# Patient Record
Sex: FEMALE | Race: WHITE | NOT HISPANIC OR LATINO | Employment: OTHER | URBAN - METROPOLITAN AREA
[De-identification: names, ages, dates, MRNs, and addresses within clinical notes are randomized per-mention and may not be internally consistent; named-entity substitution may affect disease eponyms.]

---

## 2017-01-04 ENCOUNTER — OFFICE VISIT (OUTPATIENT)
Dept: UROLOGY | Facility: CLINIC | Age: 62
End: 2017-01-04
Payer: MEDICARE

## 2017-01-04 VITALS
DIASTOLIC BLOOD PRESSURE: 51 MMHG | WEIGHT: 179 LBS | SYSTOLIC BLOOD PRESSURE: 96 MMHG | BODY MASS INDEX: 30.56 KG/M2 | HEIGHT: 64 IN | HEART RATE: 67 BPM | TEMPERATURE: 97 F

## 2017-01-04 DIAGNOSIS — Z93.59 SUPRAPUBIC CATHETER: Primary | ICD-10-CM

## 2017-01-04 DIAGNOSIS — N31.9 NEUROGENIC BLADDER: ICD-10-CM

## 2017-01-04 DIAGNOSIS — N39.490 OVERFLOW INCONTINENCE: ICD-10-CM

## 2017-01-04 PROCEDURE — 99024 POSTOP FOLLOW-UP VISIT: CPT | Mod: S$GLB,,, | Performed by: NURSE PRACTITIONER

## 2017-01-04 PROCEDURE — 87088 URINE BACTERIA CULTURE: CPT

## 2017-01-04 PROCEDURE — 87186 SC STD MICRODIL/AGAR DIL: CPT

## 2017-01-04 PROCEDURE — 87077 CULTURE AEROBIC IDENTIFY: CPT

## 2017-01-04 PROCEDURE — 1159F MED LIST DOCD IN RCRD: CPT | Mod: S$GLB,,, | Performed by: NURSE PRACTITIONER

## 2017-01-04 PROCEDURE — 87086 URINE CULTURE/COLONY COUNT: CPT

## 2017-01-04 PROCEDURE — 99999 PR PBB SHADOW E&M-EST. PATIENT-LVL V: CPT | Mod: PBBFAC,,, | Performed by: NURSE PRACTITIONER

## 2017-01-04 PROCEDURE — 99499 UNLISTED E&M SERVICE: CPT | Mod: S$GLB,,, | Performed by: NURSE PRACTITIONER

## 2017-01-04 PROCEDURE — 51700 IRRIGATION OF BLADDER: CPT | Mod: 58,S$GLB,, | Performed by: NURSE PRACTITIONER

## 2017-01-04 NOTE — MR AVS SNAPSHOT
[facility]  [address] [facility] [place] [address]  Phone: [phone]                  Tasha Hawley   2017 2:00 PM   Office Visit    Description:  Female : 1955   Provider:  Eilda Batista NP   Department:  [facility]           Reason for Visit     Urinary Tract Infection           Diagnoses this Visit        Comments    Suprapubic catheter    -  Primary     Overflow incontinence         Neurogenic bladder                To Do List           Future Appointments        Provider Department Dept Phone    2017 8:00 AM Fay Powers MD [facility] [phone]    2017 1:00 PM Shireen Mayo MD [facility] 4th Floor [phone]    2017 10:00 AM Mesfin Hodges II, MD [facility] [phone]      Goals (5 Years of Data)     None      Follow-Up and Disposition     Return if symptoms worsen or fail to improve.      [facility] On Call     [facility] On Call Nurse [facility] Emergency Department Line -  Assistance  Registered nurses in the [facility] On Call Center provide clinical advisement, health education, appointment booking, and other advisory services.  Call for this free service at [phone].             Medications           Message regarding Medications     Verify the changes and/or additions to your medication regime listed below are the same as discussed with your clinician today.  If any of these changes or additions are incorrect, please notify your healthcare provider.             Verify that the below list of medications is an accurate representation of the medications you are currently taking.  If none reported, the list may be blank. If incorrect, please contact your healthcare provider. Carry this list with you in case of emergency.           Current Medications     alprazolam (XANAX) 0.5 MG tablet Take 1 tablet (0.5 mg total) by mouth 2 (two) times daily.    aspirin (ECOTRIN) 81 MG EC tablet Take 1 tablet (81 mg  total) by mouth once daily.    atorvastatin (LIPITOR) 80 MG tablet Take 1 tablet (80 mg total) by mouth once daily.    buPROPion (WELLBUTRIN XL) 150 MG TB24 tablet Take 2 tablets (300 mg total) by mouth once daily.    BUTALBITAL/ASPIRIN/CAFFEINE (FIORINAL ORAL) Take by mouth as needed.    clopidogrel (PLAVIX) 75 mg tablet Take 1 tablet (75 mg total) by mouth once daily.    clopidogrel (PLAVIX) 75 mg tablet Take 1 tablet (75 mg total) by mouth once daily.    docusate sodium (COLACE) 100 MG capsule Take 1 capsule (100 mg total) by mouth 3 (three) times daily as needed for Constipation.    fluoxetine (PROZAC) 20 MG capsule Take 2 capsules (40 mg total) by mouth once daily.    furosemide (LASIX) 40 MG tablet Take 1 tablet (40 mg total) by mouth 2 (two) times daily.    hydrocodone-acetaminophen 10-325mg (NORCO)  mg Tab Take 1 tablet by mouth every 6 (six) hours as needed.    hyoscyamine (ANASPAZ,LEVSIN) 0.125 mg Tab Take 1 tablet (125 mcg total) by mouth every 6 (six) hours as needed.    levothyroxine (SYNTHROID) 125 MCG tablet Take 1 tablet (125 mcg total) by mouth once daily.    lisinopril (PRINIVIL,ZESTRIL) 2.5 MG tablet Take 1 tablet (2.5 mg total) by mouth once daily.    ondansetron (ZOFRAN) 8 MG tablet Take 1 tablet (8 mg total) by mouth every 12 (twelve) hours as needed for Nausea.    pantoprazole (PROTONIX) 40 MG tablet Take 1 tablet (40 mg total) by mouth once daily.    polyethylene glycol (GLYCOLAX) 17 gram PwPk Take 17 g by mouth daily as needed (Constipation.).    potassium chloride SA (K-DUR,KLOR-CON) 20 MEQ tablet Take 1 tablet (20 mEq total) by mouth 2 (two) times daily.    ranitidine (ZANTAC) 150 MG capsule Take 150 mg by mouth once daily.    SENNOSIDES (SENNA LAX ORAL) Take by mouth as needed.    tolterodine (DETROL LA) 4 MG 24 hr capsule Take 1 capsule (4 mg total) by mouth once daily.    trazodone (DESYREL) 50 MG tablet Take 3 tablets (150 mg total) by mouth nightly as needed for Insomnia.     "       Clinical Reference Information           Vital Signs - Last Recorded  Most recent update: 1/4/2017  2:06 PM by Sherrie Modi LPN    BP Pulse Temp Ht Wt BMI    (!) 96/51 67 97 °F (36.1 °C) 5' 4" (1.626 m) 81.2 kg (179 lb) 30.73 kg/m2      Blood Pressure          Most Recent Value    BP  (!)  96/51      Allergies as of 1/4/2017     Imitrex [Sumatriptan Succinate]    Penicillins    Percocet [Oxycodone-acetaminophen]    Topamax [Topiramate]    Vancomycin    (D)-limonene Flavor    Bactrim [Sulfamethoxazole-trimethoprim]    Butorphanol Tartrate    Darvocet A500 [Propoxyphene N-acetaminophen]    Fentanyl    Phenytoin Sodium Extended    Zinc Oxide-white Petrolatum    Latex, Natural Rubber      Immunizations Administered on Date of Encounter - 1/4/2017     None      Orders Placed During Today's Visit      Normal Orders This Visit    Urine culture       "

## 2017-01-04 NOTE — PROGRESS NOTES
Subjective:       Patient ID: Tasha Hawley is a 61 y.o. female.    Chief Complaint: Urinary Tract Infection    HPI Comments: Tasha Hawley is a 61 y.o. female with neurogenic bladder.  This is managed by a 20 fr SPT that was placed 11/08/2016.  She was last seen in clinic with Dr. Mayo 12/21/2016.   SPT was changed then.    She is here today 2/2 urine leaking from urethra.  She denies fever, n/v, abdominal pain dysuria.  She taking Detrol LA and hyoscyamine for breakthrough spasms.  It is not really helping with urine leaking.    SPT is draining but contains sediment.   keeps it flushed and no issues with urine draining into collection bag.                  Review of Systems   Constitutional: Negative for activity change, appetite change, chills and fever.   HENT: Negative for congestion, facial swelling, mouth sores, rhinorrhea, sore throat and trouble swallowing.    Eyes: Negative for photophobia, discharge and visual disturbance.   Respiratory: Negative for apnea, cough, chest tightness and wheezing.    Cardiovascular: Positive for leg swelling. Negative for chest pain and palpitations.   Gastrointestinal: Negative for abdominal pain, anal bleeding, constipation, diarrhea, nausea and vomiting.   Genitourinary: Positive for difficulty urinating and urgency. Negative for decreased urine volume, dysuria, flank pain, frequency, hematuria, nocturia and pelvic pain.        Urine leaking from urethra.     Musculoskeletal: Positive for arthralgias, back pain and gait problem. Negative for neck pain and neck stiffness.   Skin: Negative.  Negative for rash.        ecchymosis 2/2 falls noted to right hip/thigh     Neurological: Positive for weakness. Negative for dizziness, seizures, speech difficulty and headaches.        Has been falling at home.     Psychiatric/Behavioral: Negative for agitation, confusion, self-injury, sleep disturbance and suicidal ideas. The patient is not nervous/anxious.         Objective:      Physical Exam   Constitutional: She is oriented to person, place, and time. She appears well-developed and well-nourished.   HENT:   Head: Normocephalic.   Right Ear: External ear normal.   Left Ear: External ear normal.   Nose: Nose normal.   Eyes: Conjunctivae and lids are normal. Right eye exhibits no discharge. Left eye exhibits no discharge. No scleral icterus.   Neck: Trachea normal and normal range of motion. Neck supple. No tracheal deviation present.   Cardiovascular: Normal rate, regular rhythm and intact distal pulses.    Pulmonary/Chest: Effort normal. No respiratory distress.   Abdominal: Soft. Bowel sounds are normal. She exhibits no distension and no mass. There is no tenderness. There is no rebound and no guarding.       Musculoskeletal: Normal range of motion. She exhibits edema.   Neurological: She is alert and oriented to person, place, and time.   Skin: Skin is warm, dry and intact.     Psychiatric: She has a normal mood and affect. Her behavior is normal. Judgment and thought content normal.       Assessment:       1. Suprapubic catheter    2. Overflow incontinence    3. Neurogenic bladder        Plan:         I spent 30 minutes with the patient of which more than half was spent in direct consultation with the patient in regards to our treatment and plan.    Education and recommendations of today's plan of care including home remedies.  We discussed SPT and the expectations.   Discussed normal to have bacteria in urine and can have an odor. This not sign of serious bacterial infection.  Discussed contributing factors for sediment/urine odor.  Discussed no fever, n/v, abdominal pain & no need for antibiotics at this time; discussed the high risks of developing drug resistant bacteria with SPT.  The Balloon noted to have 30cc; I removed 10cc (leaving 20cc) and was able to reposition flush and irrigate much better.  Discussed large balloon my trigger urgency/spasms.  Continue  "Ashwini WARREN and Levsin as Dr. Mayo had ordered.  Commended them on good skin care and properly flushing to keep clear.  Discussed diet modifications; good water intake. Vitamin C 500mg BID help with urine odor.  Reassurance given. Collected some urine from SPT; not going to treat unless "symptomatic" and explained to them; voiced understanding.  They have f/u scheduled but will call next week to check.      "

## 2017-01-05 ENCOUNTER — TELEPHONE (OUTPATIENT)
Dept: UROLOGY | Facility: CLINIC | Age: 62
End: 2017-01-05

## 2017-01-05 DIAGNOSIS — N31.9 NEUROGENIC BLADDER: Primary | ICD-10-CM

## 2017-01-05 NOTE — TELEPHONE ENCOUNTER
----- Message from Suyapa Batista sent at 1/3/2017  8:14 AM CST -----  Contact: pt [phone]  Patient called in and asked to speak with Dr Mayo's nurse. Im having a hard time understanding the patient and she is falling in and out of sleep on the phone. Patient states she has a CC-Tube and she is not happy with it and it is giving problems. Patient has questions about the matter. Please call patient.

## 2017-01-05 NOTE — TELEPHONE ENCOUNTER
Spoke to the patient and she states that she has gone through 6 pads today.  Nothing is draining from the suprapubic tube.  Her  has flushed it and it is draining.  Likely that it is coming from below.  When asked she states she does leak with cough/sneeze.      Discussed that there are different forms of incontinence.  She still leaks around the catheter so this is likely urge incontinence.  We could treat this with Botox.  The stress incontinence is a different mechanism but does not seem to be as big of a problem for her.  The urine culture from yesterday's visit is pending.      Will schedule her for Botox and give her pre op abx depending on the culture.

## 2017-01-05 NOTE — TELEPHONE ENCOUNTER
Pt states she is leaking from her urethra below, going through 6-10 pads per day. States she has a lot of sediment in catheter bag from sp tube and has been irrigating as needed.

## 2017-01-07 LAB — BACTERIA UR CULT: NORMAL

## 2017-01-09 ENCOUNTER — TELEPHONE (OUTPATIENT)
Dept: UROLOGY | Facility: CLINIC | Age: 62
End: 2017-01-09

## 2017-01-09 NOTE — PRE-PROCEDURE INSTRUCTIONS
Preop instructions: NPO after midnight, shower instructions, directions, leave all valuables at home, medication instructions for PM prior & am of procedure reviewed.  verbalized understanding.      denies any issues, side effects or complications with past anesthesia or sedations for patient    Patient remains on Plavix and ASA. Anibal in urology notified, will contact Dr Mayo for further instructions

## 2017-01-09 NOTE — TELEPHONE ENCOUNTER
Called pt to confirm arrival time of 12:30pm for procedure. Gave pt NPO instructions and gave pt opportunity to ask questions. Pt verbalized understanding.

## 2017-01-09 NOTE — TELEPHONE ENCOUNTER
Left message for pt letting her know that  is cancelling her botox tomorrow. Tried both phone numbers and left messages. Will try again

## 2017-01-10 ENCOUNTER — TELEPHONE (OUTPATIENT)
Dept: UROLOGY | Facility: CLINIC | Age: 62
End: 2017-01-10

## 2017-01-10 NOTE — TELEPHONE ENCOUNTER
----- Message from Suyapa Batista sent at 1/10/2017 10:05 AM CST -----  Contact: pt [phone]  Patient states she has a procedure schedule today and she has to speak with the nurse about a possible infection. Please call patient.

## 2017-01-10 NOTE — TELEPHONE ENCOUNTER
Spoke to pt and informed her that her procedure is canceled for today due to the pt needing antibiotics and also the pt will need to be off Plavix per Dr. Mayo . The pt was told that she will get a call from anesthesia prior to her new date 1/26 to give medication instructions. Pt was also informed not to take antibiotics until 1 week prior to procedure. Pt verbalized understanding.

## 2017-01-10 NOTE — TELEPHONE ENCOUNTER
Informed pt that her previous conversation with Anibal, states she was told that procedure was cancelled today and rescheduled for 1/26, will be given ABx for treatment 1 week prior to that new date, and she is to be called by anesthesia prior to the new date with medication instructions. Verbalized understanding.

## 2017-01-11 ENCOUNTER — TELEPHONE (OUTPATIENT)
Dept: INTERNAL MEDICINE | Facility: CLINIC | Age: 62
End: 2017-01-11

## 2017-01-11 NOTE — TELEPHONE ENCOUNTER
----- Message from Amanda Grande sent at 1/10/2017 11:40 AM CST -----  Contact: self [phone]  Patient is calling to request to speak with someone in the practice, she is requesting help, she keeps falling and bruising herself. Please call patient back and advise.    Thank you

## 2017-01-11 NOTE — TELEPHONE ENCOUNTER
Spoke to patient she states she has been falling on and losing her balance for the past week. She recently fell and hit her head and it was in her words gushing blood. I advised patient she should have that checked out in the ED. Also offered patient an appointment here to talk to you about the off balance issue. She refused both. She just wanted to talk to you. Please advise

## 2017-01-12 ENCOUNTER — TELEPHONE (OUTPATIENT)
Dept: UROLOGY | Facility: CLINIC | Age: 62
End: 2017-01-12

## 2017-01-12 DIAGNOSIS — T83.510S URINARY TRACT INFECTION ASSOCIATED WITH CYSTOSTOMY CATHETER, SEQUELA: Primary | ICD-10-CM

## 2017-01-12 DIAGNOSIS — N39.0 URINARY TRACT INFECTION ASSOCIATED WITH CYSTOSTOMY CATHETER, SEQUELA: Primary | ICD-10-CM

## 2017-01-12 RX ORDER — CEFPODOXIME PROXETIL 200 MG/1
200 TABLET, FILM COATED ORAL 2 TIMES DAILY
Qty: 20 TABLET | Refills: 0 | Status: SHIPPED | OUTPATIENT
Start: 2017-01-12 | End: 2017-01-23

## 2017-01-12 NOTE — TELEPHONE ENCOUNTER
Called Mrs. Hawley to inquire about her penicillin allergy.  She confirmed it.  She has taken cephlexin or Keflex without a problem in the past.  Brennen was sent to her preferred pharmacy since she had proteus that is resistant to cipro.  Sensitive to all others it was tested against.

## 2017-01-12 NOTE — TELEPHONE ENCOUNTER
----- Message from Christi Wang LPN sent at 1/12/2017  8:10 AM CST -----  Contact: self  [phone]      ----- Message -----     From: Kalina Cohn MA     Sent: 1/11/2017   4:42 PM       To: [facility] Staff    States she is with an infection now and needs antibiotics.  States she is with a lot of leakage and has some pain.

## 2017-01-12 NOTE — TELEPHONE ENCOUNTER
Called the patient to discuss her allergy to penicillin.  She verified but when asked about cephalexin or Keflex, she states she has taken it before and tolerated it.  Sent Rx for Vantin to her preferred pharmacy and she will take this until she has her procedure on the 26th.

## 2017-01-13 ENCOUNTER — TELEPHONE (OUTPATIENT)
Dept: INTERNAL MEDICINE | Facility: CLINIC | Age: 62
End: 2017-01-13

## 2017-01-13 ENCOUNTER — NURSE TRIAGE (OUTPATIENT)
Dept: ADMINISTRATIVE | Facility: CLINIC | Age: 62
End: 2017-01-13

## 2017-01-13 ENCOUNTER — OFFICE VISIT (OUTPATIENT)
Dept: INTERNAL MEDICINE | Facility: CLINIC | Age: 62
End: 2017-01-13
Payer: MEDICARE

## 2017-01-13 VITALS
TEMPERATURE: 98 F | OXYGEN SATURATION: 99 % | SYSTOLIC BLOOD PRESSURE: 114 MMHG | WEIGHT: 160.5 LBS | BODY MASS INDEX: 27.4 KG/M2 | HEIGHT: 64 IN | HEART RATE: 55 BPM | DIASTOLIC BLOOD PRESSURE: 62 MMHG

## 2017-01-13 DIAGNOSIS — G95.9 CERVICAL MYELOPATHY: Chronic | ICD-10-CM

## 2017-01-13 DIAGNOSIS — R51.9 INTRACTABLE EPISODIC HEADACHE, UNSPECIFIED HEADACHE TYPE: ICD-10-CM

## 2017-01-13 DIAGNOSIS — I27.20 PULMONARY HTN: ICD-10-CM

## 2017-01-13 DIAGNOSIS — R29.6 FREQUENT FALLS: ICD-10-CM

## 2017-01-13 DIAGNOSIS — F41.9 ANXIETY: Primary | ICD-10-CM

## 2017-01-13 DIAGNOSIS — I70.0 THORACIC AORTA ATHEROSCLEROSIS: Chronic | ICD-10-CM

## 2017-01-13 DIAGNOSIS — F51.01 PRIMARY INSOMNIA: ICD-10-CM

## 2017-01-13 DIAGNOSIS — F11.20 NARCOTIC DEPENDENCY, CONTINUOUS: Chronic | ICD-10-CM

## 2017-01-13 DIAGNOSIS — F32.4 MAJOR DEPRESSIVE DISORDER WITH SINGLE EPISODE, IN PARTIAL REMISSION: Chronic | ICD-10-CM

## 2017-01-13 PROCEDURE — 99999 PR PBB SHADOW E&M-EST. PATIENT-LVL V: CPT | Mod: PBBFAC,,, | Performed by: NURSE PRACTITIONER

## 2017-01-13 PROCEDURE — 99214 OFFICE O/P EST MOD 30 MIN: CPT | Mod: S$GLB,,, | Performed by: NURSE PRACTITIONER

## 2017-01-13 PROCEDURE — 1159F MED LIST DOCD IN RCRD: CPT | Mod: S$GLB,,, | Performed by: NURSE PRACTITIONER

## 2017-01-13 RX ORDER — ALPRAZOLAM 0.25 MG/1
0.25 TABLET ORAL 2 TIMES DAILY
Qty: 60 TABLET | Refills: 0 | Status: SHIPPED | OUTPATIENT
Start: 2017-01-13 | End: 2017-01-13 | Stop reason: SDUPTHER

## 2017-01-13 RX ORDER — ALPRAZOLAM 0.25 MG/1
0.25 TABLET ORAL DAILY
Qty: 14 TABLET | Refills: 0 | Status: SHIPPED | OUTPATIENT
Start: 2017-01-13 | End: 2017-03-23

## 2017-01-13 NOTE — TELEPHONE ENCOUNTER
"This is my second time calling.  Spoke to patient.  She was slurring her words and difficult to understand.  Spoke to me about an "abdominal infection" treated with antibiotics by a consultant; feeling better.    I asked about the dizziness and falling, which continues.  I advised her that this cannot be evaluated well over the phone, and that Celeste's advice was correct - she either needs to come to the ER for evaluation or come for an urgent care appointment.    She seemed to understand and I anticipate she'll come for evaluation.  "

## 2017-01-13 NOTE — MR AVS SNAPSHOT
[facility]  [address]  [place] [address]  Phone: [phone]  Fax: [phone]                  Tasha Hawley   2017 6:30 PM   Office Visit    Description:  Female : 1955   Provider:  Toro Carrasco DNP   Department:  [facility] - Internal Medicine           Reason for Visit     Fall           Diagnoses this Visit        Comments    Anxiety    -  Primary     Primary insomnia         Intractable episodic headache, unspecified headache type         Frequent falls                To Do List           Future Appointments        Provider Department Dept Phone    2017 3:15 PM [facility] CT2 LIMIT [address] [facility] [phone]    2017 8:00 AM Fay Powers MD [facility] - Cardiology [phone]    2017 1:00 PM Shireen Mayo MD [facility] - Urology 4th Floor [phone]    2017 10:00 AM Mesfin Hodges II, MD [facility] - Internal Medicine [phone]    2017 11:00 AM Otilio Myers MD [facility] Neurology [phone]      Your Future Surgeries/Procedures     2017   Surgery with Shireen Mayo MD   [facility]    [address] [facility] [address] [phone]              Goals (5 Years of Data)     None       These Medications        Disp Refills Start End    suvorexant (BELSOMRA) 10 mg Tab 14 tablet 0 2017     Take 10 mg by mouth every evening. - Oral    Pharmacy: [id number] - [place], [place] - [address] Ph #: [phone]       alprazolam (XANAX) 0.25 MG tablet 14 tablet 0 2017    Take 1 tablet (0.25 mg total) by mouth once daily. - Oral    Pharmacy: [id number] - [place], [place] - [address] Ph #: [phone]         [facility]     [facility] Nurse Care Line -  Assistance  Registered nurses in the [facility] On Call Center provide clinical advisement, health education, appointment  booking, and other advisory services.  Call for this free service at [phone].             Medications           Message regarding Medications     Verify the changes and/or additions to your medication regime listed below are the same as discussed with your clinician today.  If any of these changes or additions are incorrect, please notify your healthcare provider.        START taking these NEW medications        Refills    suvorexant (BELSOMRA) 10 mg Tab 0    Sig: Take 10 mg by mouth every evening.    Class: Normal    Route: Oral    alprazolam (XANAX) 0.25 MG tablet 0    Sig: Take 1 tablet (0.25 mg total) by mouth once daily.    Class: Normal    Route: Oral      STOP taking these medications     trazodone (DESYREL) 50 MG tablet Take 3 tablets (150 mg total) by mouth nightly as needed for Insomnia.    alprazolam (XANAX XR) 0.5 MG 24 hr tablet Take 1 tablet (0.5 mg total) by mouth 2 (two) times daily.           Verify that the below list of medications is an accurate representation of the medications you are currently taking.  If none reported, the list may be blank. If incorrect, please contact your healthcare provider. Carry this list with you in case of emergency.           Current Medications     alprazolam (XANAX) 0.25 MG tablet Take 1 tablet (0.25 mg total) by mouth once daily.    aspirin (ECOTRIN) 81 MG EC tablet Take 1 tablet (81 mg total) by mouth once daily.    atorvastatin (LIPITOR) 80 MG tablet Take 1 tablet (80 mg total) by mouth once daily.    buPROPion (WELLBUTRIN XL) 150 MG TB24 tablet Take 2 tablets (300 mg total) by mouth once daily.    BUTALBITAL/ASPIRIN/CAFFEINE (FIORINAL ORAL) Take by mouth as needed.    cefpodoxime (VANTIN) 200 MG tablet Take 1 tablet (200 mg total) by mouth 2 (two) times daily.    clopidogrel (PLAVIX) 75 mg tablet Take 1 tablet (75 mg total) by mouth once daily.    clopidogrel (PLAVIX) 75 mg tablet Take 1 tablet (75 mg total) by mouth once daily.    docusate sodium  "(COLACE) 100 MG capsule Take 1 capsule (100 mg total) by mouth 3 (three) times daily as needed for Constipation.    fluoxetine (PROZAC) 20 MG capsule Take 2 capsules (40 mg total) by mouth once daily.    furosemide (LASIX) 40 MG tablet Take 1 tablet (40 mg total) by mouth 2 (two) times daily.    hydrocodone-acetaminophen 10-325mg (NORCO)  mg Tab Take 1 tablet by mouth every 6 (six) hours as needed.    hyoscyamine (ANASPAZ,LEVSIN) 0.125 mg Tab Take 1 tablet (125 mcg total) by mouth every 6 (six) hours as needed.    levothyroxine (SYNTHROID) 125 MCG tablet Take 1 tablet (125 mcg total) by mouth once daily.    lisinopril (PRINIVIL,ZESTRIL) 2.5 MG tablet Take 1 tablet (2.5 mg total) by mouth once daily.    ondansetron (ZOFRAN) 8 MG tablet Take 1 tablet (8 mg total) by mouth every 12 (twelve) hours as needed for Nausea.    pantoprazole (PROTONIX) 40 MG tablet Take 1 tablet (40 mg total) by mouth once daily.    polyethylene glycol (GLYCOLAX) 17 gram PwPk Take 17 g by mouth daily as needed (Constipation.).    potassium chloride SA (K-DUR,KLOR-CON) 20 MEQ tablet Take 1 tablet (20 mEq total) by mouth 2 (two) times daily.    ranitidine (ZANTAC) 150 MG capsule Take 150 mg by mouth once daily.    SENNOSIDES (SENNA LAX ORAL) Take by mouth as needed.    suvorexant (BELSOMRA) 10 mg Tab Take 10 mg by mouth every evening.    tolterodine (DETROL LA) 4 MG 24 hr capsule Take 1 capsule (4 mg total) by mouth once daily.           Clinical Reference Information           Vital Signs - Last Recorded  Most recent update: 1/13/2017  6:35 PM by Alecia Loo LPN    BP Pulse Temp Ht Wt SpO2    114/62 (BP Location: Right arm, Patient Position: Sitting) (!) 55 97.7 °F (36.5 °C) (Oral) 5' 4" (1.626 m) 72.8 kg (160 lb 7.9 oz) 99%    BMI                27.55 kg/m2          Blood Pressure          Most Recent Value    BP  114/62      Allergies as of 1/13/2017     Imitrex [Sumatriptan Succinate]    Penicillins    Percocet " [Oxycodone-acetaminophen]    Topamax [Topiramate]    Vancomycin    (D)-limonene Flavor    Bactrim [Sulfamethoxazole-trimethoprim]    Butorphanol Tartrate    Darvocet A500 [Propoxyphene N-acetaminophen]    Fentanyl    Phenytoin Sodium Extended    Zinc Oxide-white Petrolatum    Latex, Natural Rubber      Immunizations Administered on Date of Encounter - 1/13/2017     None      Orders Placed During Today's Visit      Normal Orders This Visit    Ambulatory consult to Neurology     Future Labs/Procedures Expected by Expires    CT Head Without Contrast  1/13/2017 1/13/2018

## 2017-01-14 NOTE — PROGRESS NOTES
Subjective:       Patient ID: Tasha Hawley is a 61 y.o. female.    Chief Complaint: Fall (fall with head injury 1 week ago, headache)    Headache    This is a new problem. The current episode started 1 to 4 weeks ago. The problem occurs daily. The problem has been unchanged. The pain is located in the vertex and left unilateral (left posterior and coronal) region. The pain does not radiate. The pain quality is not similar to prior headaches. The quality of the pain is described as sharp and throbbing. The pain is moderate. Associated symptoms include nausea and vomiting. Pertinent negatives include no abdominal pain, back pain, dizziness, ear pain, eye redness, fever, photophobia, rhinorrhea, sinus pressure or sore throat. Treatments tried: zofran, fiorinal doesn't help, vicodin makes it worse. Her past medical history is significant for migraine headaches.     Review of Systems   Constitutional: Negative for activity change, appetite change, chills, diaphoresis and fever.   HENT: Negative for congestion, ear discharge, ear pain, nosebleeds, postnasal drip, rhinorrhea, sinus pressure, sneezing, sore throat and trouble swallowing.    Eyes: Positive for visual disturbance (was present prior to most recent fall). Negative for photophobia, discharge, redness and itching.   Respiratory: Negative for chest tightness and shortness of breath.    Cardiovascular: Negative for chest pain and leg swelling.   Gastrointestinal: Positive for nausea and vomiting. Negative for abdominal distention, abdominal pain, blood in stool, constipation and diarrhea.   Genitourinary: Negative for dysuria, frequency, hematuria, urgency and vaginal discharge.   Musculoskeletal: Positive for arthralgias. Negative for back pain and myalgias.   Skin: Negative for rash and wound.   Neurological: Positive for headaches. Negative for dizziness and syncope.   Hematological: Negative for adenopathy.   Psychiatric/Behavioral: Negative for suicidal  ideas.   All other systems reviewed and are negative.      Objective:      Physical Exam   Constitutional: She is oriented to person, place, and time. Vital signs are normal. She appears well-developed and well-nourished. She is cooperative.  Non-toxic appearance. She does not have a sickly appearance. She does not appear ill. No distress.   Appears older than stated age   HENT:   Head: Normocephalic and atraumatic.   Right Ear: Hearing and external ear normal.   Left Ear: Hearing and external ear normal.   Nose: Nose normal.   Slurred speech, [place]   Eyes: Conjunctivae and EOM are normal. Pupils are equal, round, and reactive to light. Right eye exhibits no discharge. Left eye exhibits no discharge.   Neck: Normal range of motion. Neck supple.   Cardiovascular: Normal rate, regular rhythm and intact distal pulses.    Pulmonary/Chest: Effort normal. No respiratory distress. She exhibits no tenderness.   Abdominal: Normal appearance.   Genitourinary:   Genitourinary Comments: Wearing urinary leg bag reported to suprapubic leg bag   Musculoskeletal: Normal range of motion. She exhibits no edema or tenderness.   Kyphosis in upper back   Neurological: She is alert and oriented to person, place, and time. She has normal reflexes.   Skin: Skin is warm and dry.   Ecchymoses on right shoulder, swelling to the left elbow with tenderness, able to move both without limitation, PSM intact distally to both   Psychiatric: She has a normal mood and affect. Her behavior is normal. Judgment and thought content normal.       Assessment:       1. Anxiety    2. Primary insomnia    3. Intractable episodic headache, unspecified headache type    4. Frequent falls    5. Cervical myelopathy    6. Thoracic aorta atherosclerosis    7. Narcotic dependency, continuous    8. Pulmonary HTN    9. Major depressive disorder with single episode, in partial remission        Plan:       Tasha was seen today for fall.    Diagnoses and all orders for this  "visit:    Anxiety  -     Discontinue: alprazolam (XANAX) 0.25 MG tablet; Take 1 tablet (0.25 mg total) by mouth 2 (two) times daily.  -     alprazolam (XANAX) 0.25 MG tablet; Take 1 tablet (0.25 mg total) by mouth once daily.    Primary insomnia  -     suvorexant (BELSOMRA) 10 mg Tab; Take 10 mg by mouth every evening.    Intractable episodic headache, unspecified headache type  -     Ambulatory consult to Neurology  -     CT Head Without Contrast; Future    Frequent falls  -     CT Head Without Contrast; Future    Cervical myelopathy  Comments:  followed by neurology, seen 1/5/16    Thoracic aorta atherosclerosis  Comments:  followed by cardiology, seen on 1/20/16    Narcotic dependency, continuous  Comments:  stable, has been on xanax since 2012, now weaning down    Pulmonary HTN  Comments:  2d echo on 1/7/16 pa pressure of 41.64    Major depressive disorder with single episode, in partial remission  Comments:  on wellbutrin      Pt has been given instructions populated from [employer] database and has verbalized understanding of the after visit summary and information contained wherein.    Follow up with a primary care provider. May go to ER for acute shortness of breath, lightheadedness, fever, or any other emergent complaints or changes in condition.    "This note will be shared with the patient"    The [employer] medical Dictation software was used during the dictation of portions or the entirety of this medical record.  Phonetic or grammatic errors may exist due to the use of this software. For clarification, refer to the author of the document.             "

## 2017-01-16 ENCOUNTER — HOSPITAL ENCOUNTER (OUTPATIENT)
Dept: RADIOLOGY | Facility: HOSPITAL | Age: 62
Discharge: HOME | End: 2017-01-16
Attending: NURSE PRACTITIONER
Payer: MEDICARE

## 2017-01-16 DIAGNOSIS — R29.6 FREQUENT FALLS: ICD-10-CM

## 2017-01-16 DIAGNOSIS — R51.9 INTRACTABLE EPISODIC HEADACHE, UNSPECIFIED HEADACHE TYPE: ICD-10-CM

## 2017-01-16 PROCEDURE — 70450 CT HEAD/BRAIN W/O DYE: CPT | Mod: TC

## 2017-01-16 PROCEDURE — 70450 CT HEAD/BRAIN W/O DYE: CPT | Mod: 26,,, | Performed by: RADIOLOGY

## 2017-01-17 RX ORDER — TRAZODONE HYDROCHLORIDE 50 MG/1
TABLET ORAL
Qty: 90 TABLET | Refills: 0 | Status: ON HOLD | OUTPATIENT
Start: 2017-01-17 | End: 2017-01-26 | Stop reason: HOSPADM

## 2017-01-18 ENCOUNTER — TELEPHONE (OUTPATIENT)
Dept: INTERNAL MEDICINE | Facility: CLINIC | Age: 62
End: 2017-01-18

## 2017-01-18 ENCOUNTER — ANESTHESIA EVENT (OUTPATIENT)
Dept: SURGERY | Facility: HOSPITAL | Age: 62
End: 2017-01-18
Payer: MEDICARE

## 2017-01-18 NOTE — TELEPHONE ENCOUNTER
----- Message from Isabel Vang RN sent at 1/18/2017  9:34 AM CST -----  Pt scheduled for cystoscopy with botox injection by Dr. Mayo 1/26/17. In light of recent falls.  Request risk assessment and plavix instructions please.    Thank You  SANTHOSH Vang RN BC  Pre-op anesthesia

## 2017-01-18 NOTE — ANESTHESIA PREPROCEDURE EVALUATION
Anesthesia Assessment: Preoperative EQUATION     Planned Procedure: Procedure(s) (LRB):  CYSTOSCOPY (N/A)  INJECTION-BOTOX (N/A)  Requested Anesthesia Type:Monitor Anesthesia Care  Surgeon: Shireen Mayo MD  Service: Urology  Known or anticipated Date of Surgery:1/26/2017     Surgeon notes: reviewed     Electronic QUestionnaire Assessment completed via nurse interview with patient.                                     Tasha Hawley [[id number]] - 61 y.o. Female         Providers Outside of [facility]             Pre-admit from 1/26/2017 in [facility]    Pre-admit (Canceled) from 11/8/2016 in [facility]    Admission (Discharged) from 9/27/2016 in [facility]      Has outside PCP   Yes [DR. Hodges]   Yes [Cordelia Domingo FNP]   Yes [Cordelia Domingo FNP]      Has outside Pain provider   Yes [Dr. Hillman]   Yes [dr. hillman]   Yes [dr. hillman]        Surgical Risk Level       Surgical Risk Level:   1              caRDScore (Clinical Anesthesia Rapid Decision Score)         Very High  Total Score: 66       66 Sum of Clinical Scores        caRDScores (Grouped)       caRDScore - Ane:   12                       caRDScore - CVD:   15                       caRDScore - Pul:   3                       caRDScore - Met:   4                       caRDScore - Phy:   32              caRDScore Items             Pre-admit from 1/26/2017 in [facility]    Pre-admit (Canceled) from 11/8/2016 in [facility]    Pre-admit (Canceled) from 10/11/2016 in [facility]    Admission (Discharged) from 9/27/2016 in [facility]      Anesthesia                    Had IM or IV steroid injections -2 or more in last 6 mo as outpt or in-patient   Yes   Yes       Yes      Has vertigo   Yes                  Has painful neck extension   Yes [cervical myelopathy]   Yes [pain in neck with all movement]               S/P cervical fusion or other surgery severely affecting neck mobility               Yes      Has severe degenerative arthritis (osteoarthritis)       Yes [finger, back]              Has had S/P back surgery for Disc/Fusion/Scoliosis   Yes [multiple back surgeries]   Yes [times 12 surgery 1997]              Has sleep apnea confirmed by a sleep study / on CPAP   Yes [states resolved]   Yes       Yes [states machine taken away]      Has chest pain or heaviness at times   Yes                  Has GERD, hiatal hernia, or chronic heartburn/dyspepsia requiring Rx some or all times   Yes   Yes       Yes      Currently taking tranquilizers or sleep medications on a regular basis   Yes           Yes      Has chronic anxiety   Yes           Yes      CVD                    Activity similar to best ability for maximal activity or exercise   METS 2   METS 2 [uses walker/ wheelchair]       METS 2 [uses walker/ wheelchair]      Has history of a known serious heart valve abnormality (Specify)               Yes      Known heart murmur / could be due to valve abnormality               Yes      Has had heart attack (MI) at some point in past   Yes           Yes      MI happened within past year               Yes      Has coronary artery disease   Yes [per chart]           Yes      Had stent done within the past year               -- [staes was unable to stent ?]      Has/has had congestive heart failure       Yes              Cardiomypathy (specify type)       Yes              Pulmonary                    Some SOB with moderate activity or exertion               Yes      Has sleep apnea confirmed by a sleep study / on CPAP   Yes [states resolved]   Yes       Yes [states machine taken away]      Metabolic                    Is on Rx for depression or bipolar disease   Yes   Yes       Yes      Thyroid disease (Specify)   Yes [hypothyroid]   Yes [hypothyroidism]       Yes [hypothyroidism]      Has hyperlipoproteinemia   Yes   Yes        Yes      Physiologic                    Has had 3 or more hospitalizations in last year   Yes   Yes       Yes      Has been hospitalized within last 3 months       Yes              Has acute infection now / taking antibiotics   Yes                  Has recent Dx of urine infection       Yes   Yes          Has an open wound (with or without an infection)       -- [planter/ wound infection]       Yes [planter/ wound infection]      Has disorder that impairs normal immunity       Yes              Obesity Status       Mild Obesity (BMI 30-34.9)              Anti-platelet agents: Clopidogrel (Plavix)       Yes       Yes      Aspirin (taken for history of stroke or TIA)       Yes              Other blood thinners   Other blood thinner [plavix]   Other blood thinner              Has rheumatoid arthritis   Yes [hands]                  Recent syncope, pre-syncope or weakness or other unusual spell               Yes      Possible claudication vs. neuro-muscular pain, never evaluated   Yes                  Has problems emptying bladder/ u. retent. due to nerve/prostate/bladder/pelvic dis.               Yes      Has Hx of TIA (transient ischemic attack)   Yes           Yes      Has Hx of stroke       Yes       Yes      Stroke occurred within       6-12 months ago              Has neurologic deficit (paralysis, numbness, aphasia)               Yes [states walking/ bladder impaired]        Flags       Red Flag Score:   8                       Yellow Flag Score:   25              Red Flags             Pre-admit from 1/26/2017 in [facility]    Pre-admit (Canceled) from 11/8/2016 in [facility]    Pre-admit (Canceled) from 10/11/2016 in [facility]    Admission (Discharged) from 9/27/2016 in [facility]      Has been hospitalized within last 3 months       Yes              History of antibiotic resistant organism           Yes          Has burning,  frequency or foul smell on urination   Yes   Yes              Obesity Status       Mild Obesity (BMI 30-34.9)              Anti-platelet agents: Clopidogrel (Plavix)       Yes       Yes      Aspirin (taken for history of stroke or TIA)       Yes              Other blood thinners   Other blood thinner [plavix]   Other blood thinner              Had stent done within the past year               -- [staes was unable to stent ?]      Stroke occurred within       6-12 months ago              Pain not well controlled at present   Yes   Yes       Yes        Yellow Flags             Pre-admit from 1/26/2017 in [facility]    Pre-admit (Canceled) from 11/8/2016 in [facility]    Pre-admit (Canceled) from 10/11/2016 in [facility]    Admission (Discharged) from 9/27/2016 in [facility]      Has had 3 or more hospitalizations in last year   Yes   Yes       Yes      Has had surgery within the last 3 months   Yes   Yes   Yes          Has acute infection now / taking antibiotics   Yes                  Has recent Dx of urine infection       Yes   Yes          Has disorder that impairs normal immunity       Yes              Has had steroids in the last year   Yes   Yes       Yes      Obesity Status       Mild Obesity (BMI 30-34.9)              Has significant hearing impairment   Yes   Yes              Has Iron Deficiency Anemia   Yes   Yes       Yes      Aspirin       Yes       Yes      Has painful neck extension   Yes [cervical myelopathy]   Yes [pain in neck with all movement]              S/P cervical fusion or other surgery severely affecting neck mobility               Yes      Has rheumatoid arthritis   Yes [hands]                  Some SOB with moderate activity or exertion               Yes      Has sleep apnea confirmed by a sleep study / on CPAP   Yes [states resolved]   Yes       Yes [states machine taken away]      Has history of a known  serious heart valve abnormality (Specify)               Yes      Known heart murmur / could be due to valve abnormality               Yes      MI happened within past year               Yes      Has/has had congestive heart failure       Yes              Cardiomypathy (specify type)       Yes              Recent syncope, pre-syncope or weakness or other unusual spell               Yes      Possible claudication vs. neuro-muscular pain, never evaluated   Yes                  Has pain   Yes   Yes       Yes      Currently under the care of a pain specialist   Yes [keara]   Yes [Dr. Hillman]       Yes [Dr. Hillman]      Implanted Electrical Devices not related to the heart   Yes   Yes       Yes        PONV Risk Score (assumes periop narcotic use = +1, Max=4)       PONV Risk Score:   3              PONV Risk Factors  Total Score: 2       1 Female      1 Non-Smoker at present        Sleep Apnea  Total Score: 1       1 Has sleep apnea confirmed by a sleep study / on CPAP        CECI STOP-Bang Risk Factors (Max=8)  Total Score: 1       1 Age >50        CECI Risk Level - 1 (Low), 2 (Moderate), 3 (High)       CECI Risk Level:   1              RCRI (Revised Cardiac Risk Indices of ACC/AHA guidelines, Max=6)  Total Score: 3       1 Has/has had CAD      1 Has/has had CHF      1 Has Hx of stroke        CAD Risk Factors  Total Score: 3       1 Female. Age >55      1 Early CAD in 1 or more close blood relative      1 Has hyperlipoproteinemia        CHADS Score if applicable (history of atrial fib/flutter, Max=6)  Total Score: 3       1 Has/has had congestive heart failure      2 Has Hx of stroke        Maximal Exercise Capacity             Pre-admit from 1/26/2017 in [facility]      Maximal Exercise Capacity   METS 2        Summary of Dependence  Total Score: 1       1 Is totally independent of others for activities of daily living        Phone Fraility Score (Max = 17)  Total Score: 10       1 Has had 1  hospitalization in last year      2 Has had 3 or more hospitalizations in last year      1 Uses 5 or more meds on reg basis      2 Describes health as Poor      1 intermediate dependency on others for higher functional activities      1 Functional capacity limit: METS 2      1 Has a problem losing control of urine      1 Recently, often feels sad or depressed        Pain Factors             Pre-admit from 1/26/2017 in [facility]      Has pain   Yes      Location and description of pain   -- [bladder spasm / back pain]      Typical Pain Scores   7 to 10      Takes multiple opioid medications   Yes      On opioid medication for greater than 90 days   Yes      Has an intrathecal pain pump, spinal cord stimulator, or another pain relief device   Yes [dilaudid]      Pain not well controlled at present   Yes      Currently under the care of a pain specialist   Yes [keara]      Implanted pain pump   Yes        Risk Triggers (Evidence-Based Risk Triggers)         Pulmonary Risk Triggers  Total Score: 5       1 Age > or = 60      1 Has disorder that impairs normal immunity      1 Obesity Status: Mild Obesity (BMI 30-34.9)      1 Some SOB with moderate activity or exertion      1 Has sleep apnea confirmed by a sleep study / on CPAP        Renal Risk Triggers  Total Score: 0         Delirium Risk Triggers  Total Score: 1       1 Has significant hearing impairment        Urologic Risk Triggers  Total Score: 4       1 Has recent Dx of urine infection      1 Has burning, frequency or foul smell on urination      1 Has a problem losing control of urine      1 Has problems emptying bladder/ u. retent. due to nerve/prostate/bladder/pelvic dis.        Logistics         Pre-op Clinic Logistics  Total Score: 48       3 Has had 3 or more hospitalizations in last year      1 Has had 1 hospitalization in last year      1 Has had surgery within the last 3 months      1 Has outside PCP      1 Has outside Pain  provider      2 Has recent Dx of urine infection      1 Has Hx of MRSA      1 Has disorder that impairs normal immunity      2 Has burning, frequency or foul smell on urination      2 Has significant hearing impairment      1 Major Ambulatory limits (cane, walker, wheelchair, stretcher)      1 Has had anesthesia, either as adult or as a child      3 Has Iron Deficiency Anemia      1 Previous transfusions      3 Anti-platelet agents: Clopidogrel (Plavix)      3 Aspirin (taken for history of stroke or TIA)      1 Has rheumatoid arthritis      1 Has had S/P back surgery for Disc/Fusion/Scoliosis      1 Known heart murmur / could be due to valve abnormality      1 MI happened within past year      1 Has coronary artery disease      1 Currently taking medication for coronary disease      2 Recent syncope, pre-syncope or weakness or other unusual spell      2 Possible claudication vs. neuro-muscular pain, never evaluated      1 Has Hx of TIA (transient ischemic attack)      1 Has Hx of stroke      2 Stroke occurred within 6-12 MONTHS      1 Has neurologic deficit (paralysis, numbness, aphasia)      1 Has an intrathecal pain pump, spinal cord stimulator, or another pain relief device      2 Pain not well controlled at present      2 Currently under the care of a pain specialist      1 Implanted pain pump        DOSC Logistics  Total Score: 14       2 Has Hx of MRSA      1 Has disorder that impairs normal immunity      2 Has significant hearing impairment      1 Has Hearing Aid      1 Major Ambulatory limits (cane, walker, wheelchair, stretcher)      1 Anti-platelet agents: Clopidogrel (Plavix)      1 Aspirin (taken for history of stroke or TIA)      2 Has neurologic deficit (paralysis, numbness, aphasia)      1 Has peripheral neuropathy      1 Has an intrathecal pain pump, spinal cord stimulator, or another pain relief device      1 Implanted pain pump        Discharge Logistics  Total Score: 15       2 Functional  capacity limit: METS 2      1 Has significant hearing impairment      2 Major Ambulatory limits (cane, walker, wheelchair, stretcher)      1 Has rheumatoid arthritis      1 Has severe degenerative arthritis (osteoarthritis)      1 Has had S/P back surgery for Disc/Fusion/Scoliosis      1 Some SOB with moderate activity or exertion      1 Has sleep apnea confirmed by a sleep study / on CPAP      2 Has neurologic deficit (paralysis, numbness, aphasia)      1 Has peripheral neuropathy      2 Recently, often feels sad or depressed        Discharge Planning             Pre-admit from 1/26/2017 in [facility]    Pre-admit (Canceled) from 11/8/2016 in [facility]    Admission (Discharged) from 9/27/2016 in [facility]      Discharge Planning                Dependent on others for some functioning and activities of daily living           Yes      Discharge plans   Home with assistance   Home with assistance   Home with assistance      Will assist patient 24/7, if needed   -- []   -- [friend kareem]   -- [friend kareem]        Int Med <For office use only>       Total Score: 26          Surgical Risk Level Assessment                       Triage considerations:         Previous anesthesia records:GET 11/8/16  Airway (Primary) Placement Date: 11/08/16; Placement Time: 1138; Method of Intubation: Glidescope; Inserted by: JANEE Quiroga ); Airway Device: Endotracheal Tube; Mask Ventilation: Easy; Intubated: Postinduction; Airway Device Size: 7.0; Style: Cuffed; Cuff Inflation: Minimal occlusive pressure; Inflation Amount: 8; Placement Verified By: Auscultation, Capnometry; Grade: Grade I; Complicating Factors: None; Intubation Findings: Positive EtCO2, Bilateral breath sounds, Atraumatic/Condition of teeth unchanged;  Depth of Insertion: 22; Securment: Lips; Complications: None; Breath Sounds: Equal Bilateral; Insertion Attempts: 1; Removal Date: 11/08/16;  Removal  Time: 1325         Last PCP note: within [facility]  Dr. Hodges, saw NP 1/13/17 for fall with head injury  Subspecialty notes: Cardiology: General, Gastroenterology, Hematology/Oncology, Neurology, Pain Management, Psychiatry     Other important co-morbidities:   1. Anxiety    2. Primary insomnia    3. Intractable episodic headache, unspecified headache type    4. Frequent falls    5. Cervical myelopathy    6. Thoracic aorta atherosclerosis    7. Narcotic dependency, continuous    8. Pulmonary HTN    9. Major depressive disorder with single episode, in partial remission    10. Has implantable pain pump Dilaudid   11. ON anti-coagulants (Plavix)  12. GERDs  13. Anemia  14. Lumbar radiculopathy     Tests already available:  Available tests, within 3 months , within [facility] .      Instructions given. (See in Nurse's note)     Optimization:  Medical Opinion Indicated      Plan:   Consultation:Patient's PCP for a statement of optimization and instructions on plavix asa         Navigation: 61 yr old female, cards score 66// jhonny 1. Pt uses wheelchair/ walker. Chronic pain followed by Dr. Hillman (pain management), has implanted pain pump. Recently fell and hit head seen by NP in medicine . Current labs. Requested risk assessment With plavix instructions.  Phone   SANTHOSH Vang RN BC 1/18/17       Electronically signed by Isabel Vang RN at 1/18/2017 10:56 AM      addendum:       For surgical clearance, Mrs. Hawley has poor exercise tolerance and several clinical risk factors. However, she does not have any active cardiac conditions and thus can proceed with this botox procedure without further cardiovascular risk reduction.     Celeste, please call Ms Hawley and ask her to hold her plavix and aspirin for three days prior to the botox procedure to reduce the risk of bleeding. She should resume the day after the procedure or at the surgeon's instructions.     Thanks. Dr. ALLAN      Electronically signed by Mesfin TREVIZO  Carroll KENNEDY MD at 1/19/2017 12:55 PM     SANTHOSH Vang RN BC 1/24/17 01/18/2017  Tasha Hawley is a 61 y.o., female.    [facility] Anesthesia Evaluation    I have reviewed the Patient Summary Reports.    I have reviewed the Nursing Notes.   I have reviewed the Medications.     Review of Systems  Anesthesia Hx:  No problems with previous Anesthesia  History of prior surgery of interest to airway management or planning: Denies Family Hx of Anesthesia complications.   Denies Personal Hx of Anesthesia complications.   Cardiovascular:   Hypertension Past MI CAD   Pulmonary hypertension.    Echo from 1/16 reviewed   Pulmonary:   Sleep Apnea    Hepatic/GI:   GERD    Neurological:   Neuromuscular Disease, Headaches    Psych:   Psychiatric History          Physical Exam  General:  Well nourished, Obesity    Airway/Jaw/Neck:  Airway Findings: Mouth Opening: Normal Tongue: Normal  General Airway Assessment: Adult  Mallampati: II  Improves to II with phonation.  TM Distance: Normal, at least 6 cm  Jaw/Neck Findings:  Neck ROM: Normal ROM     Eyes/Ears/Nose:  EYES/EARS/NOSE FINDINGS: Normal   Dental:  Dental Findings: Upper Dentures   Chest/Lungs:  Chest/Lungs Findings:    Heart/Vascular:  Heart Findings: Heart murmur: negative       Mental Status:  Mental Status Findings: Normal        Anesthesia Plan  Type of Anesthesia, risks & benefits discussed:  Anesthesia Type:  MAC  Patient's Preference: mac  Intra-op Monitoring Plan:   Intra-op Monitoring Plan Comments:   Post Op Pain Control Plan:   Post Op Pain Control Plan Comments: Iv>po  Induction:   IV  Beta Blocker:  Patient is not currently on a Beta-Blocker (No further documentation required).       Informed Consent: Patient understands risks and agrees with Anesthesia plan.  Questions answered. Anesthesia consent signed with patient.  ASA Score: 3     Day of Surgery Review of History  & Physical:    H&P update referred to the surgeon.     Anesthesia Plan Notes: Note multiple allergies. Received versed, dilaudid, propofol and ketamine and toradol with last anes.        Ready For Surgery From Anesthesia Perspective.

## 2017-01-18 NOTE — PRE ADMISSION SCREENING
Anesthesia Assessment: Preoperative EQUATION    Planned Procedure: Procedure(s) (LRB):  CYSTOSCOPY (N/A)  INJECTION-BOTOX (N/A)  Requested Anesthesia Type:Monitor Anesthesia Care  Surgeon: Shireen Mayo MD  Service: Urology  Known or anticipated Date of Surgery:1/26/2017    Surgeon notes: reviewed    Electronic QUestionnaire Assessment completed via nurse interview with patient.        Tasha Hawley [[id number]] - 61 y.o. Female        Providers Outside of [facility]           Pre-admit from 1/26/2017 in [facility]    Pre-admit (Canceled) from 11/8/2016 in [facility]    Admission (Discharged) from 9/27/2016 in [facility]     Has outside PCP  Yes [DR. Hodges]  Yes [Cordelia Domingo FNP]  Yes [Cordelia Domingo FNP]     Has outside Pain provider  Yes [Dr. Hillman]  Yes [dr. hillman]  Yes [dr. hillman]       Surgical Risk Level      Surgical Risk Level:  1           caRDScore (Clinical Anesthesia Rapid Decision Score)        Very High  Total Score: 66      66 Sum of Clinical Scores       caRDScores (Grouped)      caRDScore - Ane:  12                caRDScore - CVD:  15                caRDScore - Pul:  3                caRDScore - Met:  4                caRDScore - Phy:  32           caRDScore Items           Pre-admit from 1/26/2017 in [facility]    Pre-admit (Canceled) from 11/8/2016 in [facility]    Pre-admit (Canceled) from 10/11/2016 in [facility]    Admission (Discharged) from 9/27/2016 in [facility]     Anesthesia            Had IM or IV steroid injections -2 or more in last 6 mo as outpt or in-patient  Yes  Yes    Yes     Has vertigo  Yes           Has painful neck extension  Yes [cervical myelopathy]  Yes [pain in neck with all movement]         S/P cervical fusion or other surgery severely affecting neck mobility        Yes     Has severe degenerative arthritis  (osteoarthritis)    Yes [finger, back]         Has had S/P back surgery for Disc/Fusion/Scoliosis  Yes [multiple back surgeries]  Yes [times 12 surgery 1997]         Has sleep apnea confirmed by a sleep study / on CPAP  Yes [states resolved]  Yes    Yes [states machine taken away]     Has chest pain or heaviness at times  Yes           Has GERD, hiatal hernia, or chronic heartburn/dyspepsia requiring Rx some or all times  Yes  Yes    Yes     Currently taking tranquilizers or sleep medications on a regular basis  Yes      Yes     Has chronic anxiety  Yes      Yes     CVD            Activity similar to best ability for maximal activity or exercise  METS 2  METS 2 [uses walker/ wheelchair]    METS 2 [uses walker/ wheelchair]     Has history of a known serious heart valve abnormality (Specify)        Yes     Known heart murmur / could be due to valve abnormality        Yes     Has had heart attack (MI) at some point in past  Yes      Yes     MI happened within past year        Yes     Has coronary artery disease  Yes [per chart]      Yes     Had stent done within the past year        -- [staes was unable to stent ?]     Has/has had congestive heart failure    Yes         Cardiomypathy (specify type)    Yes         Pulmonary            Some SOB with moderate activity or exertion        Yes     Has sleep apnea confirmed by a sleep study / on CPAP  Yes [states resolved]  Yes    Yes [states machine taken away]     Metabolic            Is on Rx for depression or bipolar disease  Yes  Yes    Yes     Thyroid disease (Specify)  Yes [hypothyroid]  Yes [hypothyroidism]    Yes [hypothyroidism]     Has hyperlipoproteinemia  Yes  Yes    Yes     Physiologic            Has had 3 or more hospitalizations in last year  Yes  Yes    Yes     Has been hospitalized within last 3 months    Yes         Has acute infection now / taking antibiotics  Yes           Has recent Dx of urine infection    Yes  Yes       Has an open wound (with or  without an infection)    -- [planter/ wound infection]    Yes [planter/ wound infection]     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 neuro-muscular pain, never evaluated  Yes           Has problems emptying bladder/ u. retent. due to nerve/prostate/bladder/pelvic dis.        Yes     Has Hx of TIA (transient ischemic attack)  Yes      Yes     Has Hx of stroke    Yes    Yes     Stroke occurred within    6-12 months ago         Has neurologic deficit (paralysis, numbness, aphasia)        Yes [states walking/ bladder impaired]       Flags      Red Flag Score:  8                Yellow Flag Score:  25           Red Flags           Pre-admit from 1/26/2017 in [facility]    Pre-admit (Canceled) from 11/8/2016 in [facility]    Pre-admit (Canceled) from 10/11/2016 in [facility]    Admission (Discharged) from 9/27/2016 in [facility]     Has been hospitalized within last 3 months    Yes         History of antibiotic resistant organism      Yes       Has burning, frequency or foul smell on urination  Yes  Yes         Obesity Status    Mild Obesity (BMI 30-34.9)         Anti-platelet agents: Clopidogrel (Plavix)    Yes    Yes     Aspirin (taken for history of stroke or TIA)    Yes         Other blood thinners  Other blood thinner [plavix]  Other blood thinner         Had stent done within the past year        -- [shreyas was unable to stent ?]     Stroke occurred within    6-12 months ago         Pain not well controlled at present  Yes   Yes    Yes       Yellow Flags           Pre-admit from 1/26/2017 in [facility]    Pre-admit (Canceled) from 11/8/2016 in [facility]    Pre-admit (Canceled) from 10/11/2016 in [facility]    Admission (Discharged) from 9/27/2016 in [facility]     Has had 3 or more hospitalizations in last year  Yes  Yes    Yes     Has had surgery within the last 3 months  Yes  Yes  Yes       Has acute infection now / taking antibiotics  Yes           Has recent Dx of urine infection    Yes  Yes       Has disorder that impairs normal immunity    Yes         Has had steroids in the last year  Yes  Yes    Yes     Obesity Status    Mild Obesity (BMI 30-34.9)         Has significant hearing impairment  Yes  Yes         Has Iron Deficiency Anemia  Yes  Yes    Yes     Aspirin    Yes    Yes     Has painful neck extension  Yes [cervical myelopathy]  Yes [pain in neck with all movement]         S/P cervical fusion or other surgery severely affecting neck mobility        Yes     Has rheumatoid arthritis  Yes [hands]           Some SOB with moderate activity or exertion        Yes     Has sleep apnea confirmed by a sleep study / on CPAP  Yes [states resolved]  Yes    Yes [states machine taken away]     Has history of a known serious heart valve abnormality (Specify)        Yes     Known heart murmur / could be due to valve abnormality        Yes     MI happened within past year        Yes     Has/has had congestive heart failure    Yes         Cardiomypathy (specify type)    Yes         Recent syncope, pre-syncope or weakness or other unusual spell        Yes     Possible claudication vs. neuro-muscular pain, never evaluated  Yes           Has pain  Yes  Yes    Yes     Currently under the care of a pain specialist  Yes [keara]  Yes [Dr. Hillman]    Yes [Dr. Hillman]     Implanted Electrical Devices not related to the heart  Yes  Yes    Yes       PONV Risk Score  (assumes periop narcotic use = +1, Max=4)      PONV Risk Score:  3           PONV Risk Factors  Total Score: 2      1 Female     1 Non-Smoker at present       Sleep Apnea  Total Score: 1      1 Has sleep apnea confirmed by a sleep study / on CPAP       CECI STOP-Bang Risk Factors (Max=8)  Total Score: 1      1 Age >50       CECI Risk Level - 1 (Low), 2 (Moderate), 3 (High)      CECI Risk Level:  1           RCRI (Revised Cardiac Risk Indices of ACC/AHA guidelines, Max=6)  Total Score: 3      1 Has/has had CAD     1 Has/has had CHF     1 Has Hx of stroke       CAD Risk Factors  Total Score: 3      1 Female. Age >55     1 Early CAD in 1 or more close blood relative     1 Has hyperlipoproteinemia       CHADS Score if applicable (history of atrial fib/flutter, Max=6)  Total Score: 3      1 Has/has had congestive heart failure     2 Has Hx of stroke       Maximal Exercise Capacity           Pre-admit from 1/26/2017 in [facility]     Maximal Exercise Capacity  METS 2       Summary of Dependence  Total Score: 1      1 Is totally independent of others for activities of daily living       Phone Fraility Score (Max = 17)  Total Score: 10      1 Has had 1 hospitalization in last year     2 Has had 3 or more hospitalizations in last year     1 Uses 5 or more meds on reg basis     2 Describes health as Poor     1 intermediate dependency on others for higher functional activities     1 Functional capacity limit: METS 2     1 Has a problem losing control of urine     1 Recently, often feels sad or depressed       Pain Factors           Pre-admit from 1/26/2017 in [facility]     Has pain  Yes     Location and description of pain  -- [bladder spasm / back pain]     Typical Pain Scores  7 to 10     Takes multiple opioid medications  Yes     On opioid medication for greater than 90 days  Yes     Has an intrathecal pain pump, spinal cord stimulator, or another pain relief device  Yes [dilaudid]      Pain not well controlled at present  Yes     Currently under the care of a pain specialist  Yes [keara]     Implanted pain pump  Yes       Risk Triggers (Evidence-Based Risk Triggers)        Pulmonary Risk Triggers  Total Score: 5      1 Age > or = 60     1 Has disorder that impairs normal immunity     1 Obesity Status: Mild Obesity (BMI 30-34.9)     1 Some SOB with moderate activity or exertion     1 Has sleep apnea confirmed by a sleep study / on CPAP       Renal Risk Triggers  Total Score: 0        Delirium Risk Triggers  Total Score: 1      1 Has significant hearing impairment       Urologic Risk Triggers  Total Score: 4      1 Has recent Dx of urine infection     1 Has burning, frequency or foul smell on urination     1 Has a problem losing control of urine     1 Has problems emptying bladder/ u. retent. due to nerve/prostate/bladder/pelvic dis.       Logistics        Pre-op Clinic Logistics  Total Score: 48      3 Has had 3 or more hospitalizations in last year     1 Has had 1 hospitalization in last year     1 Has had surgery within the last 3 months     1 Has outside PCP     1 Has outside Pain provider     2 Has recent Dx of urine infection     1 Has Hx of MRSA     1 Has disorder that impairs normal immunity     2 Has burning, frequency or foul smell on urination     2 Has significant hearing impairment     1 Major Ambulatory limits (cane, walker, wheelchair, stretcher)     1 Has had anesthesia, either as adult or as a child     3 Has Iron Deficiency Anemia     1 Previous transfusions     3 Anti-platelet agents: Clopidogrel (Plavix)     3 Aspirin (taken for history of stroke or TIA)     1 Has rheumatoid arthritis     1 Has had S/P back surgery for Disc/Fusion/Scoliosis     1 Known heart murmur / could be due to valve abnormality     1 MI happened within past year     1 Has coronary artery disease     1 Currently taking medication for coronary disease     2 Recent syncope, pre-syncope or weakness or  other unusual spell     2 Possible claudication vs. neuro-muscular pain, never evaluated     1 Has Hx of TIA (transient ischemic attack)     1 Has Hx of stroke     2 Stroke occurred within 6-12 MONTHS     1 Has neurologic deficit (paralysis, numbness, aphasia)     1 Has an intrathecal pain pump, spinal cord stimulator, or another pain relief device     2 Pain not well controlled at present     2 Currently under the care of a pain specialist     1 Implanted pain pump       DOSC Logistics  Total Score: 14      2 Has Hx of MRSA     1 Has disorder that impairs normal immunity     2 Has significant hearing impairment     1 Has Hearing Aid     1 Major Ambulatory limits (cane, walker, wheelchair, stretcher)     1 Anti-platelet agents: Clopidogrel (Plavix)     1 Aspirin (taken for history of stroke or TIA)     2 Has neurologic deficit (paralysis, numbness, aphasia)     1 Has peripheral neuropathy     1 Has an intrathecal pain pump, spinal cord stimulator, or another pain relief device     1 Implanted pain pump       Discharge Logistics  Total Score: 15      2 Functional capacity limit: METS 2     1 Has significant hearing impairment     2 Major Ambulatory limits (cane, walker, wheelchair, stretcher)     1 Has rheumatoid arthritis     1 Has severe degenerative arthritis (osteoarthritis)     1 Has had S/P back surgery for Disc/Fusion/Scoliosis     1 Some SOB with moderate activity or exertion     1 Has sleep apnea confirmed by a sleep study / on CPAP     2 Has neurologic deficit (paralysis, numbness, aphasia)     1 Has peripheral neuropathy     2 Recently, often feels sad or depressed       Discharge Planning           Pre-admit from 1/26/2017 in [facility]    Pre-admit (Canceled) from 11/8/2016 in [facility]    Admission (Discharged) from 9/27/2016 in [facility]     Discharge Planning          Dependent on others for some functioning and activities of daily  living      Yes     Discharge plans  Home with assistance  Home with assistance  Home with assistance     Will assist patient 24/7, if needed  -- []  -- [friend kareem]  -- [friend kareem]       Int Med <For office use only>      Total Score: 26        Surgical Risk Level Assessment                 Triage considerations:       Previous anesthesia records:SKIP 11/8/16  Airway (Primary) Placement Date: 11/08/16; Placement Time: 1138; Method of Intubation: Glidescope; Inserted by: JANEE Quiroga ); Airway Device: Endotracheal Tube; Mask Ventilation: Easy; Intubated: Postinduction; Airway Device Size: 7.0; Style: Cuffed; Cuff Inflation: Minimal occlusive pressure; Inflation Amount: 8; Placement Verified By: Auscultation, Capnometry; Grade: Grade I; Complicating Factors: None; Intubation Findings: Positive EtCO2, Bilateral breath sounds, Atraumatic/Condition of teeth unchanged;  Depth of Insertion: 22; Securment: Lips; Complications: None; Breath Sounds: Equal Bilateral; Insertion Attempts: 1; Removal Date: 11/08/16;  Removal Time: 1325       Last PCP note: within [facility] Dr. [facility], saw NP 1/13/17 for fall with head injury  Subspecialty notes: Cardiology: General, Gastroenterology, Hematology/Oncology, Neurology, Pain Management, Psychiatry    Other important co-morbidities:      1. Anxiety    2. Primary insomnia    3. Intractable episodic headache, unspecified headache type    4. Frequent falls    5. Cervical myelopathy    6. Thoracic aorta atherosclerosis    7. Narcotic dependency, continuous    8. Pulmonary HTN    9. Major depressive disorder with single episode, in partial remission    10.  Has implantable pain  pump Dilaudid     11.  ON anti-coagulants (Plavix)   12.  GERDs   13. Anemia    14. Lumbar radiculopathy    Tests already available:  Available tests,  within 3 months , within [facility] .            Instructions given. (See in Nurse's note)    Optimization:   Medical Opinion Indicated           Plan:        Consultation:Patient's PCP for a statement of optimization and instructions on plavix asa        Navigation: 61 yr old female, cards score 66// jhonny 1. Pt uses wheelchair/ walker. Chronic pain followed by Dr. Hillman (pain management), has implanted pain pump. Recently fell and hit head seen by NP in medicine . Current labs. Requested risk assessment  With plavix instructions.  Phone   SANTHOSH Vang RN BC 1/18/17

## 2017-01-19 ENCOUNTER — TELEPHONE (OUTPATIENT)
Dept: INTERNAL MEDICINE | Facility: CLINIC | Age: 62
End: 2017-01-19

## 2017-01-19 NOTE — TELEPHONE ENCOUNTER
Spoke to patient advised to stop plavix and aspirin. Advised patient to write it down so she doesn't forget. Patient states she understood.

## 2017-01-19 NOTE — TELEPHONE ENCOUNTER
For surgical clearance, Mrs. Hawley has poor exercise tolerance and several clinical risk factors.  However, she does not have any active cardiac conditions and thus can proceed with this botox procedure without further cardiovascular risk reduction.    Celeste, please call Ms Hawley and ask her to hold her plavix and aspirin for three days prior to the botox procedure to reduce the risk of bleeding. She should resume the day after the procedure or at the surgeon's instructions.    Thanks.  Dr. ALLAN

## 2017-01-20 ENCOUNTER — TELEPHONE (OUTPATIENT)
Dept: NEUROLOGY | Facility: CLINIC | Age: 62
End: 2017-01-20

## 2017-01-23 ENCOUNTER — OFFICE VISIT (OUTPATIENT)
Dept: UROLOGY | Facility: CLINIC | Age: 62
End: 2017-01-23
Payer: MEDICARE

## 2017-01-23 VITALS
BODY MASS INDEX: 28.83 KG/M2 | DIASTOLIC BLOOD PRESSURE: 58 MMHG | SYSTOLIC BLOOD PRESSURE: 112 MMHG | HEART RATE: 52 BPM | HEIGHT: 64 IN | WEIGHT: 168.88 LBS

## 2017-01-23 DIAGNOSIS — R33.9 INCOMPLETE EMPTYING OF BLADDER: ICD-10-CM

## 2017-01-23 DIAGNOSIS — N31.9 NEUROGENIC BLADDER: Primary | ICD-10-CM

## 2017-01-23 PROCEDURE — 99999 PR PBB SHADOW E&M-EST. PATIENT-LVL IV: CPT | Mod: PBBFAC,,, | Performed by: UROLOGY

## 2017-01-23 PROCEDURE — 99024 POSTOP FOLLOW-UP VISIT: CPT | Mod: S$GLB,,, | Performed by: UROLOGY

## 2017-01-23 PROCEDURE — 1159F MED LIST DOCD IN RCRD: CPT | Mod: S$GLB,,, | Performed by: UROLOGY

## 2017-01-23 PROCEDURE — 51705 CHANGE OF BLADDER TUBE: CPT | Mod: 58,S$GLB,, | Performed by: UROLOGY

## 2017-01-23 PROCEDURE — 99499 UNLISTED E&M SERVICE: CPT | Mod: S$GLB,,, | Performed by: UROLOGY

## 2017-01-23 RX ORDER — TOLTERODINE 4 MG/1
CAPSULE, EXTENDED RELEASE ORAL
COMMUNITY
Start: 2017-01-18 | End: 2017-02-03 | Stop reason: ALTCHOICE

## 2017-01-23 NOTE — PROGRESS NOTES
Patient was over an hour late after being rescheduled 1:20 after her original appointment.      CHIEF COMPLAINT:    Mrs. Hawley is a 61 y.o. female presenting for suprapubic catheter exchange in anticipation of Botox injection on Jan 26,2017.    PRESENTING ILLNESS:    Tasha Hawley is a 61 y.o. female who returns for follow up.  Her  states that the catheter has been draining but he cannot flush it.  She continues to have bladder spasms and incontinence per urethra.      REVIEW OF SYSTEMS:    Review of Systems   Constitutional: Negative.    HENT: Negative.    Eyes: Negative.    Respiratory: Negative.    Cardiovascular: Negative for chest pain and palpitations.   Gastrointestinal: Positive for constipation.   Genitourinary:        Urge incontinence, bladder spasms   Musculoskeletal: Positive for back pain, joint pain and neck pain.   Skin: Negative.    Endo/Heme/Allergies: Negative.    Psychiatric/Behavioral: The patient is nervous/anxious.        PATIENT HISTORY:    Past Medical History   Diagnosis Date    Allergy     Anxiety     Arthritis     Carotid artery occlusion     Cataract     Depression     Edema      chronic    Fever blister     Hypothyroid     Iron deficiency anemia     Joint pain     Lumbar radiculopathy      with chronic pain    Ocular migraine     Sleep apnea      cpap       Past Surgical History   Procedure Laterality Date    Hysterectomy  1975     endometriosis    Back surgery       x 12    Pain pump placement      Spine surgery  5-13-13     CERVICAL FUSION    Cataract extraction w/  intraocular lens implant Left      Dr Coleman      Family History   Problem Relation Age of Onset    Cancer Mother 55     breast    Cancer Father      esophagus,had laryngectomy    Parkinsonism Maternal Grandmother     Tremor Maternal Grandmother     Heart disease Maternal Uncle      Social History    Marital status:      Social History Main Topics    Smoking status: Never  Smoker    Smokeless tobacco: Never Used    Alcohol use No      Comment: denies    Drug use: No    Sexual activity: No       Allergies:  Imitrex [sumatriptan succinate]; Penicillins; Percocet [oxycodone-acetaminophen]; Topamax [topiramate]; Vancomycin; (d)-limonene flavor; Bactrim [sulfamethoxazole-trimethoprim]; Butorphanol tartrate; Darvocet a500 [propoxyphene n-acetaminophen]; Fentanyl; Phenytoin sodium extended; White petrolatum-zinc; Zinc oxide-white petrolatum; and Latex, natural rubber    Medications:  Outpatient Encounter Prescriptions as of 1/23/2017   Medication Sig Dispense Refill    alprazolam (XANAX) 0.25 MG tablet Take 1 tablet (0.25 mg total) by mouth once daily. 14 tablet 0    atorvastatin (LIPITOR) 80 MG tablet Take 1 tablet (80 mg total) by mouth once daily. 90 tablet 3    buPROPion (WELLBUTRIN XL) 150 MG TB24 tablet Take 2 tablets (300 mg total) by mouth once daily. 60 tablet 2    BUTALBITAL/ASPIRIN/CAFFEINE (FIORINAL ORAL) Take by mouth as needed.      cefpodoxime (VANTIN) 200 MG tablet Take 1 tablet (200 mg total) by mouth 2 (two) times daily. 20 tablet 0    clopidogrel (PLAVIX) 75 mg tablet Take 1 tablet (75 mg total) by mouth once daily. 90 tablet 3    clopidogrel (PLAVIX) 75 mg tablet Take 1 tablet (75 mg total) by mouth once daily. 30 tablet 6    docusate sodium (COLACE) 100 MG capsule Take 1 capsule (100 mg total) by mouth 3 (three) times daily as needed for Constipation. 90 capsule 3    fluoxetine (PROZAC) 20 MG capsule Take 2 capsules (40 mg total) by mouth once daily. 60 capsule 2    furosemide (LASIX) 40 MG tablet Take 1 tablet (40 mg total) by mouth 2 (two) times daily. (Patient taking differently: Take 40 mg by mouth 2 (two) times daily. Patient states she is taking 1.5 daily) 180 tablet 3    hydrocodone-acetaminophen 10-325mg (NORCO)  mg Tab Take 1 tablet by mouth every 6 (six) hours as needed. 61 tablet 0    levothyroxine (SYNTHROID) 125 MCG tablet Take 1 tablet  (125 mcg total) by mouth once daily. 90 tablet 3    lisinopril (PRINIVIL,ZESTRIL) 2.5 MG tablet Take 1 tablet (2.5 mg total) by mouth once daily. 90 tablet 3    ondansetron (ZOFRAN) 8 MG tablet Take 1 tablet (8 mg total) by mouth every 12 (twelve) hours as needed for Nausea. 60 tablet 3    pantoprazole (PROTONIX) 40 MG tablet Take 1 tablet (40 mg total) by mouth once daily. 90 tablet 3    polyethylene glycol (GLYCOLAX) 17 gram PwPk Take 17 g by mouth daily as needed (Constipation.). 30 packet 5    potassium chloride SA (K-DUR,KLOR-CON) 20 MEQ tablet Take 1 tablet (20 mEq total) by mouth 2 (two) times daily. (Patient taking differently: Take 20 mEq by mouth 2 (two) times daily. Patient states only taking once daily) 180 tablet 3    ranitidine (ZANTAC) 150 MG capsule Take 150 mg by mouth once daily.      SENNOSIDES (SENNA LAX ORAL) Take by mouth as needed.      tolterodine (DETROL LA) 4 MG 24 hr capsule       trazodone (DESYREL) 50 MG tablet TAKE 3 TABLETS BY MOUTH EVERY NIGHT AS NEEDED FOR INSOMNIA 90 tablet 0    aspirin (ECOTRIN) 81 MG EC tablet Take 1 tablet (81 mg total) by mouth once daily.  0    hyoscyamine (ANASPAZ,LEVSIN) 0.125 mg Tab Take 1 tablet (125 mcg total) by mouth every 6 (six) hours as needed. 120 tablet 1    tolterodine (DETROL LA) 4 MG 24 hr capsule Take 1 capsule (4 mg total) by mouth once daily. 30 capsule 11     No facility-administered encounter medications on file as of 1/23/2017.          PHYSICAL EXAMINATION:    The patient generally appears in good health, is appropriately interactive, and is in no apparent distress.    Skin: No lesions.    Mental: Cooperative with normal affect.    Neuro: Grossly intact.    HEENT: Normal. No evidence of lymphadenopathy.    Chest: normal inspiratory effort.     Abdomen:  Soft, non-tender. No masses or organomegaly. Bladder is not palpable. No evidence of flank discomfort. No evidence of inguinal hernia.  The suprapubic tube site is clean.  Small  amount of hyperplastic granulation tissue.      Extremities: No clubbing, cyanosis.  Both lower extremities are large      IMPRESSION:    Neurogenic bladder with incomplete emptying, bladder spasms    PLAN:    1.  The catheter was removed.  Area prepped and a 20 Fr silicone catheter was placed.  There was some gross hematuria, likely secondary to the catheter removal (the balloon does not return to its original conformation and creates a lip at the end)    2.  New leg bag, catheter fixation strap placed  3.  Continue the Vantin  4.  Follow up for cystoscopy Botox injection.  Consent obtained.  Discussed that this may not take care of all of her incontinence, but will help with urge incontinence and bladder spasms.  She may have a component of stress incontinence.

## 2017-01-25 ENCOUNTER — TELEPHONE (OUTPATIENT)
Dept: UROLOGY | Facility: CLINIC | Age: 62
End: 2017-01-25

## 2017-01-25 NOTE — TELEPHONE ENCOUNTER
Called pt's Dangelo to confirm arrival time for procedure. Gave pt's  NPO instructions and gave pt's  opportunity to ask questions. Pt's  verbalized understanding.

## 2017-01-26 ENCOUNTER — ANESTHESIA (OUTPATIENT)
Dept: SURGERY | Facility: HOSPITAL | Age: 62
End: 2017-01-26
Payer: MEDICARE

## 2017-01-26 ENCOUNTER — SURGERY (OUTPATIENT)
Age: 62
End: 2017-01-26

## 2017-01-26 ENCOUNTER — HOSPITAL ENCOUNTER (OUTPATIENT)
Facility: HOSPITAL | Age: 62
Discharge: HOME | End: 2017-01-26
Attending: UROLOGY | Admitting: UROLOGY
Payer: MEDICARE

## 2017-01-26 VITALS
HEIGHT: 64 IN | BODY MASS INDEX: 27.31 KG/M2 | WEIGHT: 160 LBS | HEART RATE: 64 BPM | TEMPERATURE: 98 F | OXYGEN SATURATION: 98 % | RESPIRATION RATE: 18 BRPM | SYSTOLIC BLOOD PRESSURE: 118 MMHG | DIASTOLIC BLOOD PRESSURE: 71 MMHG

## 2017-01-26 DIAGNOSIS — N31.9 NEUROGENIC BLADDER: ICD-10-CM

## 2017-01-26 PROCEDURE — 37000009 HC ANESTHESIA EA ADD 15 MINS: Performed by: UROLOGY

## 2017-01-26 PROCEDURE — 27200921 HC BAG, CYSTO DRAINAGE: Performed by: UROLOGY

## 2017-01-26 PROCEDURE — D9220A PRA ANESTHESIA: Mod: ANES,,, | Performed by: ANESTHESIOLOGY

## 2017-01-26 PROCEDURE — 25000003 PHARM REV CODE 250: Performed by: UROLOGY

## 2017-01-26 PROCEDURE — 36000706: Performed by: UROLOGY

## 2017-01-26 PROCEDURE — 71000015 HC POSTOP RECOV 1ST HR: Performed by: UROLOGY

## 2017-01-26 PROCEDURE — 37000008 HC ANESTHESIA 1ST 15 MINUTES: Performed by: UROLOGY

## 2017-01-26 PROCEDURE — D9220A PRA ANESTHESIA: Mod: CRNA,,, | Performed by: NURSE ANESTHETIST, CERTIFIED REGISTERED

## 2017-01-26 PROCEDURE — 27201466: Performed by: UROLOGY

## 2017-01-26 PROCEDURE — 88305 TISSUE EXAM BY PATHOLOGIST: CPT | Performed by: PATHOLOGY

## 2017-01-26 PROCEDURE — 63600175 PHARM REV CODE 636 W HCPCS: Performed by: NURSE ANESTHETIST, CERTIFIED REGISTERED

## 2017-01-26 PROCEDURE — 36000707: Performed by: UROLOGY

## 2017-01-26 PROCEDURE — 25000003 PHARM REV CODE 250: Performed by: NURSE ANESTHETIST, CERTIFIED REGISTERED

## 2017-01-26 PROCEDURE — 52287 CYSTOSCOPY CHEMODENERVATION: CPT | Mod: 58,,, | Performed by: UROLOGY

## 2017-01-26 PROCEDURE — 63600175 PHARM REV CODE 636 W HCPCS: Performed by: UROLOGY

## 2017-01-26 PROCEDURE — 71000039 HC RECOVERY, EACH ADD'L HOUR: Performed by: UROLOGY

## 2017-01-26 PROCEDURE — 52204 CYSTOSCOPY W/BIOPSY(S): CPT | Mod: 58,51,, | Performed by: UROLOGY

## 2017-01-26 PROCEDURE — 27200971 HC CYSTO SUPPLY II (SCOPE PRCDR.): Performed by: UROLOGY

## 2017-01-26 PROCEDURE — 27200993: Performed by: UROLOGY

## 2017-01-26 PROCEDURE — 25000003 PHARM REV CODE 250: Performed by: STUDENT IN AN ORGANIZED HEALTH CARE EDUCATION/TRAINING PROGRAM

## 2017-01-26 PROCEDURE — 71000033 HC RECOVERY, INTIAL HOUR: Performed by: UROLOGY

## 2017-01-26 RX ORDER — KETAMINE HCL IN 0.9 % NACL 50 MG/5 ML
SYRINGE (ML) INTRAVENOUS
Status: DISCONTINUED | OUTPATIENT
Start: 2017-01-26 | End: 2017-01-26

## 2017-01-26 RX ORDER — SODIUM CHLORIDE 9 MG/ML
INJECTION, SOLUTION INTRAVENOUS CONTINUOUS
Status: DISCONTINUED | OUTPATIENT
Start: 2017-01-26 | End: 2017-01-26 | Stop reason: HOSPADM

## 2017-01-26 RX ORDER — LIDOCAINE HCL/PF 100 MG/5ML
SYRINGE (ML) INTRAVENOUS
Status: DISCONTINUED | OUTPATIENT
Start: 2017-01-26 | End: 2017-01-26

## 2017-01-26 RX ORDER — PROPOFOL 10 MG/ML
VIAL (ML) INTRAVENOUS CONTINUOUS PRN
Status: DISCONTINUED | OUTPATIENT
Start: 2017-01-26 | End: 2017-01-26

## 2017-01-26 RX ORDER — MIDAZOLAM HYDROCHLORIDE 1 MG/ML
INJECTION, SOLUTION INTRAMUSCULAR; INTRAVENOUS
Status: DISCONTINUED | OUTPATIENT
Start: 2017-01-26 | End: 2017-01-26

## 2017-01-26 RX ORDER — PROPOFOL 10 MG/ML
VIAL (ML) INTRAVENOUS
Status: DISCONTINUED | OUTPATIENT
Start: 2017-01-26 | End: 2017-01-26

## 2017-01-26 RX ORDER — HYDROCODONE BITARTRATE AND ACETAMINOPHEN 5; 325 MG/1; MG/1
1 TABLET ORAL EVERY 4 HOURS PRN
Status: DISCONTINUED | OUTPATIENT
Start: 2017-01-26 | End: 2017-01-26 | Stop reason: HOSPADM

## 2017-01-26 RX ORDER — ONDANSETRON 8 MG/1
8 TABLET, ORALLY DISINTEGRATING ORAL EVERY 8 HOURS PRN
Status: DISCONTINUED | OUTPATIENT
Start: 2017-01-26 | End: 2017-01-26 | Stop reason: HOSPADM

## 2017-01-26 RX ORDER — LIDOCAINE HYDROCHLORIDE 20 MG/ML
JELLY TOPICAL
Status: DISCONTINUED | OUTPATIENT
Start: 2017-01-26 | End: 2017-01-26 | Stop reason: HOSPADM

## 2017-01-26 RX ADMIN — PROPOFOL 20 MG: 10 INJECTION, EMULSION INTRAVENOUS at 01:01

## 2017-01-26 RX ADMIN — ONABOTULINUMTOXINA 200 UNITS: 100 INJECTION, POWDER, LYOPHILIZED, FOR SOLUTION INTRADERMAL; INTRAMUSCULAR at 01:01

## 2017-01-26 RX ADMIN — SODIUM CHLORIDE: 0.9 INJECTION, SOLUTION INTRAVENOUS at 01:01

## 2017-01-26 RX ADMIN — Medication 20 MG: at 01:01

## 2017-01-26 RX ADMIN — LIDOCAINE HYDROCHLORIDE 50 MG: 20 INJECTION, SOLUTION INTRAVENOUS at 01:01

## 2017-01-26 RX ADMIN — Medication 2 G: at 01:01

## 2017-01-26 RX ADMIN — Medication 10 MG: at 02:01

## 2017-01-26 RX ADMIN — MIDAZOLAM HYDROCHLORIDE 2 MG: 1 INJECTION, SOLUTION INTRAMUSCULAR; INTRAVENOUS at 01:01

## 2017-01-26 RX ADMIN — PROPOFOL 30 MG: 10 INJECTION, EMULSION INTRAVENOUS at 01:01

## 2017-01-26 RX ADMIN — LIDOCAINE HYDROCHLORIDE 10 ML: 20 JELLY TOPICAL at 02:01

## 2017-01-26 RX ADMIN — PROPOFOL 150 MCG/KG/MIN: 10 INJECTION, EMULSION INTRAVENOUS at 01:01

## 2017-01-26 RX ADMIN — HYDROCODONE BITARTRATE AND ACETAMINOPHEN 1 TABLET: 5; 325 TABLET ORAL at 04:01

## 2017-01-26 NOTE — IP AVS SNAPSHOT
[facility]  [address]  [facility] [address]  Phone: [phone]           Patient Discharge Instructions     Our goal is to set you up for success. This packet includes information on your condition, medications, and your home care. It will help you to care for yourself so you don't get sicker and need to go back to the hospital.     Please ask your nurse if you have any questions.        There are many details to remember when preparing to leave the hospital. Here is what you will need to do:    1. Take your medicine. If you are prescribed medications, review your Medication List in the following pages. You may have new medications to  at the pharmacy and others that you'll need to stop taking. Review the instructions for how and when to take your medications. Talk with your doctor or nurses if you are unsure of what to do.     2. Go to your follow-up appointments. Specific follow-up information is listed in the following pages. Your may be contacted by a transition nurse or clinical provider about future appointments. Be sure we have all of the phone numbers to reach you, if needed. Please contact your provider's office if you are unable to make an appointment.     3. Watch for warning signs. Your doctor or nurse will give you detailed warning signs to watch for and when to call for assistance. These instructions may also include educational information about your condition. If you experience any of warning signs to your health, call your doctor.               [facility]  Unless otherwise directed by your provider, please contact [facility], our nurse care line that is available for 24/7 assistance.     [phone] (toll-free)    Registered nurses in the [facility] provide clinical advisement, health education, appointment booking, and other advisory services.                    ** Verify the list of medication(s) below is accurate and up  to date. Carry this with you in case of emergency. If your medications have changed, please notify your healthcare provider.             Medication List      CHANGE how you take these medications        Additional Info                      furosemide 40 MG tablet   Commonly known as:  LASIX   Quantity:  180 tablet   Refills:  3   Dose:  40 mg   What changed:  additional instructions    Instructions:  Take 1 tablet (40 mg total) by mouth 2 (two) times daily.     Begin Date    AM    Noon    PM    Bedtime       potassium chloride SA 20 MEQ tablet   Commonly known as:  K-DUR,KLOR-CON   Quantity:  180 tablet   Refills:  3   Dose:  20 mEq   What changed:  additional instructions    Instructions:  Take 1 tablet (20 mEq total) by mouth 2 (two) times daily.     Begin Date    AM    Noon    PM    Bedtime         CONTINUE taking these medications        Additional Info                      alprazolam 0.25 MG tablet   Commonly known as:  XANAX   Quantity:  14 tablet   Refills:  0   Dose:  0.25 mg    Instructions:  Take 1 tablet (0.25 mg total) by mouth once daily.     Begin Date    AM    Noon    PM    Bedtime       aspirin 81 MG EC tablet   Commonly known as:  ECOTRIN   Refills:  0   Dose:  81 mg    Instructions:  Take 1 tablet (81 mg total) by mouth once daily.     Begin Date    AM    Noon    PM    Bedtime       atorvastatin 80 MG tablet   Commonly known as:  LIPITOR   Quantity:  90 tablet   Refills:  3   Dose:  80 mg    Instructions:  Take 1 tablet (80 mg total) by mouth once daily.     Begin Date    AM    Noon    PM    Bedtime       buPROPion 150 MG TB24 tablet   Commonly known as:  WELLBUTRIN XL   Quantity:  60 tablet   Refills:  2   Dose:  300 mg    Instructions:  Take 2 tablets (300 mg total) by mouth once daily.     Begin Date    AM    Noon    PM    Bedtime       * clopidogrel 75 mg tablet   Commonly known as:  PLAVIX   Quantity:  90 tablet   Refills:  3   Dose:  75 mg    Instructions:  Take 1 tablet (75 mg total) by  mouth once daily.     Begin Date    AM    Noon    PM    Bedtime       * clopidogrel 75 mg tablet   Commonly known as:  PLAVIX   Quantity:  30 tablet   Refills:  6   Dose:  75 mg    Instructions:  Take 1 tablet (75 mg total) by mouth once daily.     Begin Date    AM    Noon    PM    Bedtime       docusate sodium 100 MG capsule   Commonly known as:  COLACE   Quantity:  90 capsule   Refills:  3   Dose:  100 mg    Instructions:  Take 1 capsule (100 mg total) by mouth 3 (three) times daily as needed for Constipation.     Begin Date    AM    Noon    PM    Bedtime       FIORINAL ORAL   Refills:  0    Instructions:  Take by mouth as needed.     Begin Date    AM    Noon    PM    Bedtime       fluoxetine 20 MG capsule   Commonly known as:  PROZAC   Quantity:  60 capsule   Refills:  2   Dose:  40 mg    Instructions:  Take 2 capsules (40 mg total) by mouth once daily.     Begin Date    AM    Noon    PM    Bedtime       hydrocodone-acetaminophen 10-325mg  mg Tab   Commonly known as:  NORCO   Quantity:  61 tablet   Refills:  0   Dose:  1 tablet    Instructions:  Take 1 tablet by mouth every 6 (six) hours as needed.     Begin Date    AM    Noon    PM    Bedtime       hyoscyamine 0.125 mg Tab   Commonly known as:  ANASPAZ,LEVSIN   Quantity:  120 tablet   Refills:  1   Dose:  125 mcg    Instructions:  Take 1 tablet (125 mcg total) by mouth every 6 (six) hours as needed.     Begin Date    AM    Noon    PM    Bedtime       levothyroxine 125 MCG tablet   Commonly known as:  SYNTHROID   Quantity:  90 tablet   Refills:  3   Dose:  125 mcg    Instructions:  Take 1 tablet (125 mcg total) by mouth once daily.     Begin Date    AM    Noon    PM    Bedtime       lisinopril 2.5 MG tablet   Commonly known as:  PRINIVIL,ZESTRIL   Quantity:  90 tablet   Refills:  3   Dose:  2.5 mg    Instructions:  Take 1 tablet (2.5 mg total) by mouth once daily.     Begin Date    AM    Noon    PM    Bedtime       ondansetron 8 MG tablet   Commonly known  as:  ZOFRAN   Quantity:  60 tablet   Refills:  3   Dose:  8 mg    Instructions:  Take 1 tablet (8 mg total) by mouth every 12 (twelve) hours as needed for Nausea.     Begin Date    AM    Noon    PM    Bedtime       pantoprazole 40 MG tablet   Commonly known as:  PROTONIX   Quantity:  90 tablet   Refills:  3   Dose:  40 mg    Instructions:  Take 1 tablet (40 mg total) by mouth once daily.     Begin Date    AM    Noon    PM    Bedtime       polyethylene glycol 17 gram Pwpk   Commonly known as:  GLYCOLAX   Quantity:  30 packet   Refills:  5   Dose:  17 g    Instructions:  Take 17 g by mouth daily as needed (Constipation.).     Begin Date    AM    Noon    PM    Bedtime       ranitidine 150 MG capsule   Commonly known as:  ZANTAC   Refills:  0   Dose:  150 mg    Instructions:  Take 150 mg by mouth once daily.     Begin Date    AM    Noon    PM    Bedtime       SENNA LAX ORAL   Refills:  0    Instructions:  Take by mouth as needed.     Begin Date    AM    Noon    PM    Bedtime       tolterodine 4 MG 24 hr capsule   Commonly known as:  DETROL LA   Refills:  0      Begin Date    AM    Noon    PM    Bedtime       * Notice:  This list has 2 medication(s) that are the same as other medications prescribed for you. Read the directions carefully, and ask your doctor or other care provider to review them with you.      STOP taking these medications     trazodone 50 MG tablet   Commonly known as:  DESYREL         ASK your doctor about these medications        Additional Info                      lamotrigine 25 MG tablet   Commonly known as:  LAMICTAL   Quantity:  30 tablet   Refills:  6   Dose:  25 mg    Instructions:  Take 1 tablet (25 mg total) by mouth once daily.     Begin Date    AM    Noon    PM    Bedtime                  Please bring to all follow up appointments:    1. A copy of your discharge instructions.  2. All medicines you are currently taking in their original bottles.  3. Identification and insurance  card.    Please arrive 15 minutes ahead of scheduled appointment time.    Please call 24 hours in advance if you must reschedule your appointment and/or time.        Your Scheduled Appointments     Feb 07, 2017 10:00 AM CST   Established Patient Visit with MD Thierry Lundberg II - Internal Medicine ([facility])    [address]  [facility] [id number] [phone]              Follow-up Information     Follow up with Shireen Mayo MD In 2 weeks.    Specialty:  Urology    Why:  post op    Contact information:    [address]  [facility] [address]  [phone]          Discharge Instructions     Future Orders    Activity as tolerated     Call MD for:  persistent nausea and vomiting     Call MD for:  severe uncontrolled pain     Call MD for:  temperature >100.4     Diet general     Questions:    Total calories:      Fat restriction, if any:      Protein restriction, if any:      Na restriction, if any:      Fluid restriction:      Additional restrictions:      No dressing needed         Discharge Instructions         Cystoscopy  Cystoscopy is a procedure that lets your doctor look directly inside your urethra and bladder. It can be used to:  · Help diagnose a problem with your urethra, bladder, or kidneys.  · Take a sample (biopsy) of bladder or urethral tissue.  · Treat certain problems (such as removing kidney stones).  · Place a stent to bypass an obstruction.  · Take special X-rays of the kidneys.  Based on the findings, your doctor may recommend other tests or treatments.  What is a cystoscope?  A cystoscope is a telescope-like instrument that contains lenses and fiberoptics (small glass wires that make bright light). The cystoscope may be straight and rigid, or flexible to bend around curves in the urethra. The doctor may look directly into the cystoscope, or project the image onto a monitor.  Getting ready  · Ask your doctor if you should stop taking any  medications prior to the procedure.  · Ask whether you should avoid eating or drinking anything after midnight before the procedure.  · Follow any other instructions your doctor gives you.  Tell your doctor before the exam if you:  · Take any medications, such as aspirin or blood thinners  · Have allergies to any medications  · Are pregnant   The procedure  Cystoscopy is done in the doctors office or hospital. The doctor and a nurse are present during the procedure. It takes only a few minutes, longer if a biopsy, X-ray, or treatment needs to be done.  During the procedure:  · You lie on an exam table on your back, knees bent and legs apart. You are covered with a drape.  · Your urethra and the area around it are washed. Anesthetic jelly may be applied to numb the urethra. Other pain medication is usually not needed. In some cases, you may be offered a mild sedative to help you relax. If a more extensive procedure is to be done, such as a biopsy or kidney stone removal, general anesthesia may be needed.  · The cystoscope is inserted. A sterile fluid is put into the bladder to expand it. You may feel pressure from this fluid.  · When the procedure is done, the cystoscope is removed.  After the procedure  If you had a sedative, general anesthesia, or spinal anesthesia, you must have someone drive you home. Once youre home:  · Drink plenty of fluids.  · You may have burning or light bleeding when you urinate--this is normal.  · Medications may be prescribed to ease any discomfort or prevent infection. Take these as directed.  · Call your doctor if you have heavy bleeding or blood clots, burning that lasts more than a day, a fever over 100°F  (38° C), or trouble urinating.  © 5164-3871 [employer]. [address] [place] [address], [place] [address]. All rights reserved. This information is not intended as a substitute for professional medical care. Always follow your healthcare professional's  "instructions.            Primary Diagnosis     Your primary diagnosis was:  Neurogenic Bladder Disorder      Admission Information     Date & Time Provider Department CSN    1/26/2017  8:53 AM Shireen Mayo MD [facility] [id number]      Care Providers     Provider Role Specialty Primary office phone    Shireen Mayo MD Attending Provider Urology [phone]    Shireen Mayo MD Surgeon  Urology [phone]      Your Vitals Were     BP Pulse Temp Resp Height Weight    136/65 (BP Location: Right arm, Patient Position: Lying, BP Method: Automatic) 60 96.4 °F (35.8 °C) (Axillary) 16 5' 4" (1.626 m) 72.6 kg (160 lb)    SpO2 BMI             100% 27.46 kg/m2         Recent Lab Values        2/16/2012 6/23/2012 8/13/2013 5/28/2014 3/28/2015 8/25/2016            9:42 AM  7:00 AM 10:40 AM  5:36 AM 12:45 AM 10:10 AM      A1C 5.5 4.7 5.9 5.8 5.4 5.4      Comment for A1C at 10:10 AM on 8/25/2016:  According to ADA guidelines, hemoglobin A1C <7.0% represents  optimal control in non-pregnant diabetic patients.  Different  metrics may apply to specific populations.   Standards of Medical Care in Diabetes - 2016.  For the purpose of screening for the presence of diabetes:  <5.7%     Consistent with the absence of diabetes  5.7-6.4%  Consistent with increasing risk for diabetes   (prediabetes)  >or=6.5%  Consistent with diabetes  Currently no consensus exists for use of hemoglobin A1C  for diagnosis of diabetes for children.        Allergies as of 1/26/2017        Reactions    Imitrex [Sumatriptan Succinate] Shortness Of Breath    Penicillins Shortness Of Breath    Other reaction(s): Jittery    Percocet [Oxycodone-acetaminophen] Anaphylaxis    Topamax [Topiramate] Shortness Of Breath    Vancomycin Shortness Of Breath    Rash    (D)-limonene Flavor     Other reaction(s): difficult intubation  Other reaction(s): Jittery  Other reaction(s): Difficulty breathing  Other reaction(s): Difficulty " breathing  Other reaction(s): Jittery  Other reaction(s): Difficulty breathing    Bactrim [Sulfamethoxazole-trimethoprim] Nausea And Vomiting    Other reaction(s): Resp Depression  Other reaction(s): Anaphylaxis    Butorphanol Tartrate     Darvocet A500 [Propoxyphene N-acetaminophen]     Other reaction(s): Jittery  Other reaction(s): Difficulty breathing    Fentanyl     Other reaction(s): Vomiting  Other reaction(s): Nausea  Other reaction(s): Itching  swelling    Phenytoin Sodium Extended     pATIENT DENIES EVER HAVING THIS MEDICATION    White Petrolatum-zinc     Zinc Oxide-white Petrolatum     Other reaction(s): Difficulty breathing    Latex, Natural Rubber Itching, Rash      Advance Directives     An advance directive is a document which, in the event you are no longer able to make decisions for yourself, tells your healthcare team what kind of treatment you do or do not want to receive, or who you would like to make those decisions for you.  If you do not currently have an advance directive, [facility] encourages you to create one.  For more information call:  [phone]), [phone] ([phone]),  or log on to [email or web address].        Language Assistance Services     ATTENTION: Language assistance services are available, free of charge. Please call [phone].      ATENCIÓN: Si habla español, tiene a morgan disposición servicios gratuitos de asistencia lingüística. Llame al [phone].     [facility] Ý: N?u b?n nói Ti?ng Vi?t, có các d?ch v? h? tr? ngôn ng? mi?n phí dành cho b?n. G?i s? [phone].         [facility] complies with applicable Federal civil rights laws and does not discriminate on the basis of race, color, national origin, age, disability, or sex.

## 2017-01-26 NOTE — TRANSFER OF CARE
"Anesthesia Transfer of Care Note    Patient: Tasha Hawley    Procedure(s) Performed: Procedure(s) (LRB):  CYSTOSCOPY (N/A)  INJECTION-BOTOX (N/A)  BIOPSY-BLADDER (N/A)  FULGURATION-BLADDER (N/A)    Patient location: PACU    Anesthesia Type: general    Transport from OR: Transported from OR on 6-10 L/min O2 by face mask with adequate spontaneous ventilation    Post pain: adequate analgesia    Post assessment: tolerated procedure well and no apparent anesthetic complications    Post vital signs: stable    Level of consciousness: sedated    Nausea/Vomiting: no nausea/vomiting    Complications: none          Last vitals:   Visit Vitals    /65 (BP Location: Right arm, Patient Position: Lying, BP Method: Automatic)    Pulse 60    Temp 35.8 °C (96.4 °F) (Axillary)    Resp 16    Ht 5' 4" (1.626 m)    Wt 72.6 kg (160 lb)    SpO2 100%    Breastfeeding No    BMI 27.46 kg/m2     "

## 2017-01-26 NOTE — DISCHARGE INSTRUCTIONS
Cystoscopy  Cystoscopy is a procedure that lets your doctor look directly inside your urethra and bladder. It can be used to:  · Help diagnose a problem with your urethra, bladder, or kidneys.  · Take a sample (biopsy) of bladder or urethral tissue.  · Treat certain problems (such as removing kidney stones).  · Place a stent to bypass an obstruction.  · Take special X-rays of the kidneys.  Based on the findings, your doctor may recommend other tests or treatments.  What is a cystoscope?  A cystoscope is a telescope-like instrument that contains lenses and fiberoptics (small glass wires that make bright light). The cystoscope may be straight and rigid, or flexible to bend around curves in the urethra. The doctor may look directly into the cystoscope, or project the image onto a monitor.  Getting ready  · Ask your doctor if you should stop taking any medications prior to the procedure.  · Ask whether you should avoid eating or drinking anything after midnight before the procedure.  · Follow any other instructions your doctor gives you.  Tell your doctor before the exam if you:  · Take any medications, such as aspirin or blood thinners  · Have allergies to any medications  · Are pregnant   The procedure  Cystoscopy is done in the doctors office or hospital. The doctor and a nurse are present during the procedure. It takes only a few minutes, longer if a biopsy, X-ray, or treatment needs to be done.  During the procedure:  · You lie on an exam table on your back, knees bent and legs apart. You are covered with a drape.  · Your urethra and the area around it are washed. Anesthetic jelly may be applied to numb the urethra. Other pain medication is usually not needed. In some cases, you may be offered a mild sedative to help you relax. If a more extensive procedure is to be done, such as a biopsy or kidney stone removal, general anesthesia may be needed.  · The cystoscope is inserted. A sterile fluid is put into the  bladder to expand it. You may feel pressure from this fluid.  · When the procedure is done, the cystoscope is removed.  After the procedure  If you had a sedative, general anesthesia, or spinal anesthesia, you must have someone drive you home. Once youre home:  · Drink plenty of fluids.  · You may have burning or light bleeding when you urinate--this is normal.  · Medications may be prescribed to ease any discomfort or prevent infection. Take these as directed.  · Call your doctor if you have heavy bleeding or blood clots, burning that lasts more than a day, a fever over 100°F  (38° C), or trouble urinating.  © 9964-2434 [employer] [facility]. [address] [place] [address], [place] [address]. All rights reserved. This information is not intended as a substitute for professional medical care. Always follow your healthcare professional's instructions.

## 2017-01-26 NOTE — INTERVAL H&P NOTE
The patient has been examined and the H&P has been reviewed:    I concur with the findings and no changes have occurred since H&P was written.    Anesthesia/Surgery risks, benefits and alternative options discussed and understood by patient/family.          Active Hospital Problems    Diagnosis  POA    Neurogenic bladder [N31.9]  Yes      Resolved Hospital Problems    Diagnosis Date Resolved POA   No resolved problems to display.       Patient seen in holding.  No changes in clinical condition.  Proceed with planned procedure.

## 2017-01-26 NOTE — PROGRESS NOTES
DR Mayo stated do not need urine sample; SCDs placed on bed; pt has both her legs already wrapped by MD.

## 2017-01-26 NOTE — DISCHARGE SUMMARY
[facility]  Discharge Note  Short Stay    Admit Date: 1/26/2017    Discharge Date and Time: 1/26/2017    Attending Physician: Shireen Mayo MD     Discharge Provider: Michelle iY    Diagnoses:  Active Hospital Problems    Diagnosis  POA    *Neurogenic bladder [N31.9]  Yes      Resolved Hospital Problems    Diagnosis Date Resolved POA   No resolved problems to display.       Discharged Condition: good    Hospital Course: Patient was admitted for an outpatient procedure and tolerated the procedure well with no complications.    Final Diagnoses: Same as principal problem.    Disposition: Home or Self Care    Follow up/Patient Instructions:    Medications:  Reconciled Home Medications:   Current Discharge Medication List      CONTINUE these medications which have NOT CHANGED    Details   alprazolam (XANAX) 0.25 MG tablet Take 1 tablet (0.25 mg total) by mouth once daily.  Qty: 14 tablet, Refills: 0    Associated Diagnoses: Anxiety      atorvastatin (LIPITOR) 80 MG tablet Take 1 tablet (80 mg total) by mouth once daily.  Qty: 90 tablet, Refills: 3      buPROPion (WELLBUTRIN XL) 150 MG TB24 tablet Take 2 tablets (300 mg total) by mouth once daily.  Qty: 60 tablet, Refills: 2      !! clopidogrel (PLAVIX) 75 mg tablet Take 1 tablet (75 mg total) by mouth once daily.  Qty: 90 tablet, Refills: 3    Associated Diagnoses: Thoracic aorta atherosclerosis; NSTEMI (non-ST elevated myocardial infarction); Ischemic cardiomyopathy      docusate sodium (COLACE) 100 MG capsule Take 1 capsule (100 mg total) by mouth 3 (three) times daily as needed for Constipation.  Qty: 90 capsule, Refills: 3      fluoxetine (PROZAC) 20 MG capsule Take 2 capsules (40 mg total) by mouth once daily.  Qty: 60 capsule, Refills: 2      furosemide (LASIX) 40 MG tablet Take 1 tablet (40 mg total) by mouth 2 (two) times daily.  Qty: 180 tablet, Refills: 3    Associated Diagnoses: Congestive heart failure, unspecified congestive heart  failure chronicity, unspecified congestive heart failure type      hydrocodone-acetaminophen 10-325mg (NORCO)  mg Tab Take 1 tablet by mouth every 6 (six) hours as needed.  Qty: 61 tablet, Refills: 0      hyoscyamine (ANASPAZ,LEVSIN) 0.125 mg Tab Take 1 tablet (125 mcg total) by mouth every 6 (six) hours as needed.  Qty: 120 tablet, Refills: 1    Associated Diagnoses: Bladder spasm      levothyroxine (SYNTHROID) 125 MCG tablet Take 1 tablet (125 mcg total) by mouth once daily.  Qty: 90 tablet, Refills: 3    Associated Diagnoses: Hypothyroidism, unspecified type      lisinopril (PRINIVIL,ZESTRIL) 2.5 MG tablet Take 1 tablet (2.5 mg total) by mouth once daily.  Qty: 90 tablet, Refills: 3      pantoprazole (PROTONIX) 40 MG tablet Take 1 tablet (40 mg total) by mouth once daily.  Qty: 90 tablet, Refills: 3      polyethylene glycol (GLYCOLAX) 17 gram PwPk Take 17 g by mouth daily as needed (Constipation.).  Qty: 30 packet, Refills: 5    Associated Diagnoses: Chronic constipation      potassium chloride SA (K-DUR,KLOR-CON) 20 MEQ tablet Take 1 tablet (20 mEq total) by mouth 2 (two) times daily.  Qty: 180 tablet, Refills: 3    Associated Diagnoses: Hypokalemia      tolterodine (DETROL LA) 4 MG 24 hr capsule       aspirin (ECOTRIN) 81 MG EC tablet Take 1 tablet (81 mg total) by mouth once daily.  Refills: 0      BUTALBITAL/ASPIRIN/CAFFEINE (FIORINAL ORAL) Take by mouth as needed.      !! clopidogrel (PLAVIX) 75 mg tablet Take 1 tablet (75 mg total) by mouth once daily.  Qty: 30 tablet, Refills: 6    Associated Diagnoses: Thoracic aorta atherosclerosis; NSTEMI (non-ST elevated myocardial infarction); Ischemic cardiomyopathy      ondansetron (ZOFRAN) 8 MG tablet Take 1 tablet (8 mg total) by mouth every 12 (twelve) hours as needed for Nausea.  Qty: 60 tablet, Refills: 3    Associated Diagnoses: Nausea      ranitidine (ZANTAC) 150 MG capsule Take 150 mg by mouth once daily.      SENNOSIDES (SENNA LAX ORAL) Take by mouth  as needed.       !! - Potential duplicate medications found. Please discuss with provider.      STOP taking these medications       trazodone (DESYREL) 50 MG tablet Comments:   Reason for Stopping:         lamotrigine (LAMICTAL) 25 MG tablet Comments:   Reason for Stopping:               Discharge Procedure Orders  Diet general     Activity as tolerated     Call MD for:  temperature >100.4     Call MD for:  persistent nausea and vomiting     Call MD for:  severe uncontrolled pain     No dressing needed       Follow-up Information     Follow up with Shireen Mayo MD In 2 weeks.    Specialty:  Urology    Why:  post op    Contact information:    [facility] [facility] [address]  [phone]              Michelle Yi MD  Urology, PGY-2  Pager# [phone]    As above

## 2017-01-26 NOTE — ANESTHESIA POSTPROCEDURE EVALUATION
"Anesthesia Post Evaluation    Patient: Tasha Hawley    Procedure(s) Performed: Procedure(s) (LRB):  CYSTOSCOPY (N/A)  INJECTION-BOTOX (N/A)  BIOPSY-BLADDER (N/A)  FULGURATION-BLADDER (N/A)    Final Anesthesia Type: general  Patient location during evaluation: PACU  Patient participation: Yes- Able to Participate  Level of consciousness: awake and alert  Post-procedure vital signs: reviewed and stable  Pain management: adequate  Airway patency: patent  PONV status at discharge: No PONV  Anesthetic complications: no      Cardiovascular status: blood pressure returned to baseline  Respiratory status: unassisted, spontaneous ventilation and room air  Hydration status: euvolemic  Follow-up not needed.        Visit Vitals    /65 (BP Location: Right arm, Patient Position: Lying, BP Method: Automatic)    Pulse 60    Temp 35.8 °C (96.4 °F) (Axillary)    Resp 16    Ht 5' 4" (1.626 m)    Wt 72.6 kg (160 lb)    SpO2 100%    Breastfeeding No    BMI 27.46 kg/m2       Pain/Low Score: Pain Assessment Performed: Yes (1/26/2017  2:50 PM)  Presence of Pain: complains of pain/discomfort (1/26/2017  2:50 PM)  Low Score: 10 (1/26/2017  2:50 PM)      "

## 2017-01-26 NOTE — ANESTHESIA RELEASE NOTE
"Anesthesia Release from PACU Note    Patient: Tasha Hawley    Procedure(s) Performed: Procedure(s) (LRB):  CYSTOSCOPY (N/A)  INJECTION-BOTOX (N/A)  BIOPSY-BLADDER (N/A)  FULGURATION-BLADDER (N/A)    Anesthesia type: general    Post pain: Adequate analgesia    Post assessment: no apparent anesthetic complications and tolerated procedure well    Last Vitals:   Visit Vitals    /65 (BP Location: Right arm, Patient Position: Lying, BP Method: Automatic)    Pulse 60    Temp 35.8 °C (96.4 °F) (Axillary)    Resp 16    Ht 5' 4" (1.626 m)    Wt 72.6 kg (160 lb)    SpO2 100%    Breastfeeding No    BMI 27.46 kg/m2       Post vital signs: stable    Level of consciousness: awake and alert     Nausea/Vomiting: no nausea/no vomiting    Complications: none    Airway Patency: patent    Respiratory: unassisted, spontaneous ventilation, room air    Cardiovascular: stable and blood pressure at baseline    Hydration: euvolemic  "

## 2017-01-26 NOTE — OP NOTE
[facility]  Operative Note    Date: 01/26/2017    Pre-Op Diagnosis: Neurogenic bladder    Post-Op Diagnosis: same    Procedure(s) Performed:   1.  Cystoscopy with bladder botox injection  2.  Bladder biopsy  3.  Fulguration    Specimen(s): bladder biopsy    Staff Surgeon:  Shireen Mayo MD    Assistant Surgeon: Michelle Yi MD    Anesthesia: Monitored Local Anesthesia with Sedation    Indications: Tasha Hawley is a 61 y.o. female with hx of neurogenic bladder.     Findings:   1. Normal appearing urethra  2. Bilateral ureteral orifices in normal anatomical position  3. Area of abnormality with slight papillary appearance on posterior bladder wall    Estimated Blood Loss: min    Drains: 20 Fr silicone suprapubic tube    Procedure in Detail:  After informed consent was obtained the patient was brought to the cystoscopy suite and placed in the supine position.  SCDs were applied and working.  Anesthesia was administered.  When the patient was adequately sedated she was placed in the dorsal lithotomy position and prepped and draped in the usual sterile fashion.      A rigid cystoscope in a 22 Fr sheath was introduced into the patients's bladder via the urethra.  This passed easily.  Formal cystoscopy was performed which revealed the ureteral orifices in their normal anatomic position bilaterally. The suprapubic tube was visualized at the dome in good position. There was an abnormal area noted to the posterior bladder wall with a slight papillary appearance. This area was biopsied with cold cup forceps and then fulgurated with the Bugbee.     We then proceeded with the botox injection. 200 units of botox was injected into the detrusor muscle throughout the bladder.  Good wheals were raised.  The patient's bladder was drained and the cystoscope was removed.      The patient tolerated the procedure well and was transferred to the recovery room in stable condition.      Disposition:  The  patient will follow up with Dr. Mayo in 2 weeks.        Michelle Yi MD    I was present for the entire case and agree with the above note.

## 2017-01-30 ENCOUNTER — TELEPHONE (OUTPATIENT)
Dept: INTERNAL MEDICINE | Facility: CLINIC | Age: 62
End: 2017-01-30

## 2017-01-30 NOTE — TELEPHONE ENCOUNTER
----- Message from Avis Menendez sent at 1/30/2017 12:15 PM CST -----  Contact: self  Pt is returning call from this office. She can be reached at [phone]

## 2017-02-01 ENCOUNTER — HOSPITAL ENCOUNTER (OUTPATIENT)
Facility: HOSPITAL | Age: 62
Discharge: HOME | End: 2017-02-03
Attending: EMERGENCY MEDICINE | Admitting: HOSPITALIST
Payer: MEDICARE

## 2017-02-01 DIAGNOSIS — K21.9 GASTROESOPHAGEAL REFLUX DISEASE, ESOPHAGITIS PRESENCE NOT SPECIFIED: ICD-10-CM

## 2017-02-01 DIAGNOSIS — G89.29 OTHER CHRONIC PAIN: ICD-10-CM

## 2017-02-01 DIAGNOSIS — R53.83 LETHARGY: ICD-10-CM

## 2017-02-01 DIAGNOSIS — E03.9 PRIMARY HYPOTHYROIDISM: ICD-10-CM

## 2017-02-01 DIAGNOSIS — I25.10 CORONARY ARTERY DISEASE INVOLVING NATIVE CORONARY ARTERY OF NATIVE HEART WITHOUT ANGINA PECTORIS: ICD-10-CM

## 2017-02-01 DIAGNOSIS — F41.9 ANXIETY: ICD-10-CM

## 2017-02-01 DIAGNOSIS — D50.9 IRON DEFICIENCY ANEMIA, UNSPECIFIED IRON DEFICIENCY ANEMIA TYPE: ICD-10-CM

## 2017-02-01 DIAGNOSIS — I51.89 COMBINED SYSTOLIC AND DIASTOLIC CARDIAC DYSFUNCTION: ICD-10-CM

## 2017-02-01 DIAGNOSIS — E02 SUBCLINICAL IODINE-DEFICIENCY HYPOTHYROIDISM: ICD-10-CM

## 2017-02-01 DIAGNOSIS — T14.90XA TRAUMA: ICD-10-CM

## 2017-02-01 DIAGNOSIS — N30.01 ACUTE CYSTITIS WITH HEMATURIA: Primary | ICD-10-CM

## 2017-02-01 DIAGNOSIS — M54.16 LUMBAR RADICULOPATHY: ICD-10-CM

## 2017-02-01 DIAGNOSIS — N31.9 NEUROGENIC BLADDER: ICD-10-CM

## 2017-02-01 DIAGNOSIS — R63.4 WEIGHT LOSS, UNINTENTIONAL: ICD-10-CM

## 2017-02-01 DIAGNOSIS — F11.20 NARCOTIC DEPENDENCY, CONTINUOUS: ICD-10-CM

## 2017-02-01 DIAGNOSIS — K59.03 DRUG-INDUCED CONSTIPATION: ICD-10-CM

## 2017-02-01 DIAGNOSIS — E03.9 HYPOTHYROIDISM, UNSPECIFIED TYPE: ICD-10-CM
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
## 2017-02-01 DIAGNOSIS — K59.09 CHRONIC CONSTIPATION: ICD-10-CM

## 2017-02-01 LAB
ALBUMIN SERPL BCP-MCNC: 3.4 G/DL
ALP SERPL-CCNC: 124 U/L
ALT SERPL W/O P-5'-P-CCNC: 8 U/L
AMPHET+METHAMPHET UR QL: NEGATIVE
ANION GAP SERPL CALC-SCNC: 6 MMOL/L
AST SERPL-CCNC: 16 U/L
BACTERIA #/AREA URNS AUTO: ABNORMAL /HPF
BARBITURATES UR QL SCN>200 NG/ML: NORMAL
BASOPHILS # BLD AUTO: 0 K/UL
BASOPHILS NFR BLD: 0 %
BENZODIAZ UR QL SCN>200 NG/ML: NORMAL
BILIRUB SERPL-MCNC: 0.5 MG/DL
BILIRUB UR QL STRIP: NEGATIVE
BUN SERPL-MCNC: 10 MG/DL
BZE UR QL SCN: NEGATIVE
CALCIUM SERPL-MCNC: 9.1 MG/DL
CANNABINOIDS UR QL SCN: NEGATIVE
CHLORIDE SERPL-SCNC: 99 MMOL/L
CK SERPL-CCNC: 52 U/L
CLARITY UR REFRACT.AUTO: ABNORMAL
CO2 SERPL-SCNC: 36 MMOL/L
COLOR UR AUTO: YELLOW
CREAT SERPL-MCNC: 0.7 MG/DL
CREAT UR-MCNC: 15 MG/DL
DIFFERENTIAL METHOD: ABNORMAL
EOSINOPHIL # BLD AUTO: 0.1 K/UL
EOSINOPHIL NFR BLD: 2.9 %
ERYTHROCYTE [DISTWIDTH] IN BLOOD BY AUTOMATED COUNT: 13.9 %
EST. GFR  (AFRICAN AMERICAN): >60 ML/MIN/1.73 M^2
EST. GFR  (NON AFRICAN AMERICAN): >60 ML/MIN/1.73 M^2
ETHANOL SERPL-MCNC: <10 MG/DL
GLUCOSE SERPL-MCNC: 84 MG/DL
GLUCOSE UR QL STRIP: NEGATIVE
HCT VFR BLD AUTO: 34.7 %
HGB BLD-MCNC: 11 G/DL
HGB UR QL STRIP: ABNORMAL
KETONES UR QL STRIP: NEGATIVE
LACTATE SERPL-SCNC: 0.9 MMOL/L
LEUKOCYTE ESTERASE UR QL STRIP: ABNORMAL
LIPASE SERPL-CCNC: 14 U/L
LYMPHOCYTES # BLD AUTO: 1.2 K/UL
LYMPHOCYTES NFR BLD: 23.9 %
MCH RBC QN AUTO: 27.8 PG
MCHC RBC AUTO-ENTMCNC: 31.7 %
MCV RBC AUTO: 88 FL
METHADONE UR QL SCN>300 NG/ML: NEGATIVE
MICROSCOPIC COMMENT: ABNORMAL
MONOCYTES # BLD AUTO: 0.5 K/UL
MONOCYTES NFR BLD: 10.2 %
NEUTROPHILS # BLD AUTO: 3 K/UL
NEUTROPHILS NFR BLD: 62.8 %
NITRITE UR QL STRIP: POSITIVE
OPIATES UR QL SCN: NORMAL
PCP UR QL SCN>25 NG/ML: NEGATIVE
PH UR STRIP: 8 [PH] (ref 5–8)
PLATELET # BLD AUTO: 233 K/UL
PMV BLD AUTO: 9.9 FL
POTASSIUM SERPL-SCNC: 3.3 MMOL/L
PROT SERPL-MCNC: 7.1 G/DL
PROT UR QL STRIP: ABNORMAL
RBC # BLD AUTO: 3.96 M/UL
RBC #/AREA URNS AUTO: >100 /HPF (ref 0–4)
SODIUM SERPL-SCNC: 141 MMOL/L
SP GR UR STRIP: 1 (ref 1–1.03)
T4 FREE SERPL-MCNC: 1.08 NG/DL
TOXICOLOGY INFORMATION: NORMAL
TROPONIN I SERPL DL<=0.01 NG/ML-MCNC: 0.01 NG/ML
TSH SERPL DL<=0.005 MIU/L-ACNC: 21.6 UIU/ML
URN SPEC COLLECT METH UR: ABNORMAL
UROBILINOGEN UR STRIP-ACNC: NEGATIVE EU/DL
WBC # BLD AUTO: 4.82 K/UL
WBC #/AREA URNS AUTO: >100 /HPF (ref 0–5)
WBC CLUMPS UR QL AUTO: ABNORMAL

## 2017-02-01 PROCEDURE — 83690 ASSAY OF LIPASE: CPT

## 2017-02-01 PROCEDURE — 84484 ASSAY OF TROPONIN QUANT: CPT

## 2017-02-01 PROCEDURE — G0378 HOSPITAL OBSERVATION PER HR: HCPCS

## 2017-02-01 PROCEDURE — 87077 CULTURE AEROBIC IDENTIFY: CPT

## 2017-02-01 PROCEDURE — 96365 THER/PROPH/DIAG IV INF INIT: CPT

## 2017-02-01 PROCEDURE — 84439 ASSAY OF FREE THYROXINE: CPT

## 2017-02-01 PROCEDURE — 80320 DRUG SCREEN QUANTALCOHOLS: CPT

## 2017-02-01 PROCEDURE — 25000003 PHARM REV CODE 250: Performed by: NURSE PRACTITIONER

## 2017-02-01 PROCEDURE — 99284 EMERGENCY DEPT VISIT MOD MDM: CPT | Mod: 25

## 2017-02-01 PROCEDURE — 25000003 PHARM REV CODE 250: Performed by: EMERGENCY MEDICINE

## 2017-02-01 PROCEDURE — 87086 URINE CULTURE/COLONY COUNT: CPT | Mod: 59

## 2017-02-01 PROCEDURE — 83605 ASSAY OF LACTIC ACID: CPT

## 2017-02-01 PROCEDURE — 87040 BLOOD CULTURE FOR BACTERIA: CPT

## 2017-02-01 PROCEDURE — 80053 COMPREHEN METABOLIC PANEL: CPT

## 2017-02-01 PROCEDURE — 85025 COMPLETE CBC W/AUTO DIFF WBC: CPT

## 2017-02-01 PROCEDURE — 82550 ASSAY OF CK (CPK): CPT

## 2017-02-01 PROCEDURE — 81001 URINALYSIS AUTO W/SCOPE: CPT | Mod: 91

## 2017-02-01 PROCEDURE — 87088 URINE BACTERIA CULTURE: CPT | Mod: 59

## 2017-02-01 PROCEDURE — 87086 URINE CULTURE/COLONY COUNT: CPT

## 2017-02-01 PROCEDURE — 99285 EMERGENCY DEPT VISIT HI MDM: CPT | Mod: ,,, | Performed by: EMERGENCY MEDICINE

## 2017-02-01 PROCEDURE — 82570 ASSAY OF URINE CREATININE: CPT

## 2017-02-01 PROCEDURE — 63600175 PHARM REV CODE 636 W HCPCS: Performed by: EMERGENCY MEDICINE

## 2017-02-01 PROCEDURE — 87186 SC STD MICRODIL/AGAR DIL: CPT

## 2017-02-01 PROCEDURE — 96361 HYDRATE IV INFUSION ADD-ON: CPT

## 2017-02-01 PROCEDURE — 81001 URINALYSIS AUTO W/SCOPE: CPT

## 2017-02-01 PROCEDURE — 84443 ASSAY THYROID STIM HORMONE: CPT

## 2017-02-01 RX ORDER — POLYETHYLENE GLYCOL 3350 17 G/17G
17 POWDER, FOR SOLUTION ORAL 2 TIMES DAILY
Status: DISCONTINUED | OUTPATIENT
Start: 2017-02-01 | End: 2017-02-03 | Stop reason: HOSPADM

## 2017-02-01 RX ORDER — AMOXICILLIN 250 MG
1 CAPSULE ORAL 2 TIMES DAILY
Status: DISCONTINUED | OUTPATIENT
Start: 2017-02-01 | End: 2017-02-03 | Stop reason: HOSPADM

## 2017-02-01 RX ORDER — ASPIRIN 81 MG/1
81 TABLET ORAL DAILY
Status: DISCONTINUED | OUTPATIENT
Start: 2017-02-02 | End: 2017-02-03 | Stop reason: HOSPADM

## 2017-02-01 RX ORDER — CLOPIDOGREL BISULFATE 75 MG/1
75 TABLET ORAL DAILY
Status: DISCONTINUED | OUTPATIENT
Start: 2017-02-02 | End: 2017-02-03 | Stop reason: HOSPADM

## 2017-02-01 RX ORDER — FLUOXETINE HYDROCHLORIDE 20 MG/1
40 CAPSULE ORAL DAILY
Status: DISCONTINUED | OUTPATIENT
Start: 2017-02-02 | End: 2017-02-03 | Stop reason: HOSPADM

## 2017-02-01 RX ORDER — ONDANSETRON 2 MG/ML
4 INJECTION INTRAMUSCULAR; INTRAVENOUS EVERY 8 HOURS PRN
Status: DISCONTINUED | OUTPATIENT
Start: 2017-02-01 | End: 2017-02-03 | Stop reason: HOSPADM

## 2017-02-01 RX ORDER — PANTOPRAZOLE SODIUM 40 MG/1
40 TABLET, DELAYED RELEASE ORAL DAILY
Status: DISCONTINUED | OUTPATIENT
Start: 2017-02-02 | End: 2017-02-03 | Stop reason: HOSPADM

## 2017-02-01 RX ORDER — POTASSIUM CHLORIDE 20 MEQ/1
20 TABLET, EXTENDED RELEASE ORAL DAILY
Status: DISCONTINUED | OUTPATIENT
Start: 2017-02-02 | End: 2017-02-03 | Stop reason: HOSPADM

## 2017-02-01 RX ORDER — ACETAMINOPHEN 325 MG/1
650 TABLET ORAL EVERY 6 HOURS PRN
Status: DISCONTINUED | OUTPATIENT
Start: 2017-02-02 | End: 2017-02-03 | Stop reason: HOSPADM

## 2017-02-01 RX ORDER — HYOSCYAMINE SULFATE 0.12 MG/ML
0.12 LIQUID ORAL EVERY 6 HOURS PRN
Status: DISCONTINUED | OUTPATIENT
Start: 2017-02-01 | End: 2017-02-03 | Stop reason: HOSPADM

## 2017-02-01 RX ORDER — OXYBUTYNIN CHLORIDE 5 MG/1
10 TABLET, EXTENDED RELEASE ORAL DAILY
Status: DISCONTINUED | OUTPATIENT
Start: 2017-02-02 | End: 2017-02-03 | Stop reason: HOSPADM

## 2017-02-01 RX ORDER — BUPROPION HYDROCHLORIDE 300 MG/1
300 TABLET ORAL DAILY
Status: DISCONTINUED | OUTPATIENT
Start: 2017-02-02 | End: 2017-02-03 | Stop reason: HOSPADM

## 2017-02-01 RX ORDER — LACTULOSE 10 G/15ML
20 SOLUTION ORAL 2 TIMES DAILY PRN
Status: DISCONTINUED | OUTPATIENT
Start: 2017-02-02 | End: 2017-02-03 | Stop reason: HOSPADM

## 2017-02-01 RX ORDER — ATORVASTATIN CALCIUM 20 MG/1
80 TABLET, FILM COATED ORAL NIGHTLY
Status: DISCONTINUED | OUTPATIENT
Start: 2017-02-01 | End: 2017-02-03 | Stop reason: HOSPADM

## 2017-02-01 RX ORDER — ONDANSETRON 8 MG/1
8 TABLET, ORALLY DISINTEGRATING ORAL EVERY 8 HOURS PRN
Status: DISCONTINUED | OUTPATIENT
Start: 2017-02-01 | End: 2017-02-03 | Stop reason: HOSPADM

## 2017-02-01 RX ORDER — POTASSIUM CHLORIDE 20 MEQ/1
40 TABLET, EXTENDED RELEASE ORAL EVERY 4 HOURS
Status: COMPLETED | OUTPATIENT
Start: 2017-02-01 | End: 2017-02-02

## 2017-02-01 RX ORDER — IBUPROFEN 400 MG/1
400 TABLET ORAL EVERY 6 HOURS PRN
Status: DISCONTINUED | OUTPATIENT
Start: 2017-02-02 | End: 2017-02-02

## 2017-02-01 RX ORDER — NALOXONE HCL 0.4 MG/ML
0.4 VIAL (ML) INJECTION
Status: DISCONTINUED | OUTPATIENT
Start: 2017-02-02 | End: 2017-02-03 | Stop reason: HOSPADM

## 2017-02-01 RX ORDER — LISINOPRIL 2.5 MG/1
2.5 TABLET ORAL DAILY
Status: DISCONTINUED | OUTPATIENT
Start: 2017-02-02 | End: 2017-02-03 | Stop reason: HOSPADM

## 2017-02-01 RX ADMIN — CEFTRIAXONE 1 G: 1 INJECTION, SOLUTION INTRAVENOUS at 04:02

## 2017-02-01 RX ADMIN — ACETAMINOPHEN 650 MG: 325 TABLET ORAL at 11:02

## 2017-02-01 RX ADMIN — STANDARDIZED SENNA CONCENTRATE AND DOCUSATE SODIUM 1 TABLET: 8.6; 5 TABLET, FILM COATED ORAL at 11:02

## 2017-02-01 RX ADMIN — SODIUM CHLORIDE 500 ML: 0.9 INJECTION, SOLUTION INTRAVENOUS at 04:02

## 2017-02-01 RX ADMIN — POTASSIUM CHLORIDE 40 MEQ: 1500 TABLET, EXTENDED RELEASE ORAL at 11:02

## 2017-02-01 RX ADMIN — POLYETHYLENE GLYCOL 3350 17 G: 17 POWDER, FOR SOLUTION ORAL at 11:02

## 2017-02-01 RX ADMIN — ATORVASTATIN CALCIUM 80 MG: 20 TABLET, FILM COATED ORAL at 11:02

## 2017-02-01 NOTE — ED PROVIDER NOTES
Encounter Date: 2/1/2017    SCRIBE #1 NOTE: I, Annel Trejo, am scribing for, and in the presence of,  Dr. Alvarez. I have scribed the entire note.       History     Chief Complaint   Patient presents with    Altered Mental Status     initial call to EMS was because of a fall after losing her balance. pt found on floor by EMS. had cystoscopy Thursday. catheter to leg bag present with bright red blood. on Plavix. pt found to be altered also. with hx of narcotic dependency. given Narcan 0.5 by EMS. with some wakefulness afterwards.      Review of patient's allergies indicates:   Allergen Reactions    Imitrex [sumatriptan succinate] Shortness Of Breath    Penicillins Shortness Of Breath     Other reaction(s): Jittery    Percocet [oxycodone-acetaminophen] Anaphylaxis    Topamax [topiramate] Shortness Of Breath    Vancomycin Shortness Of Breath     Rash    (d)-limonene flavor      Other reaction(s): difficult intubation  Other reaction(s): Jittery  Other reaction(s): Difficulty breathing  Other reaction(s): Difficulty breathing  Other reaction(s): Jittery  Other reaction(s): Difficulty breathing    Bactrim [sulfamethoxazole-trimethoprim] Nausea And Vomiting     Other reaction(s): Resp Depression  Other reaction(s): Anaphylaxis    Butorphanol tartrate     Darvocet a500 [propoxyphene n-acetaminophen]      Other reaction(s): Jittery  Other reaction(s): Difficulty breathing    Fentanyl      Other reaction(s): Vomiting  Other reaction(s): Nausea  Other reaction(s): Itching  swelling    Phenytoin sodium extended      pATIENT DENIES EVER HAVING THIS MEDICATION    White petrolatum-zinc     Zinc oxide-white petrolatum      Other reaction(s): Difficulty breathing    Latex, natural rubber Itching and Rash     HPI     Time seen by provider: 2:44 PM    This is a 61 y.o. female with PM Hx of iron deficiency anemia s/p multiple back surgeries (chronic back and leg pain) who presents with complaint of AMS. Pt had an  unwitnessed fall and was found by her  around 12 PM on the floor in a puddle of water, as she had been trying to get something out of the freezer. Per daughter, he tried to pick her up but she was mentally altered. Unknown syncope. Increased number of falls in the past three months. Pt had a recent botox procedure to paralyze bladder spasms and daughter reports hematuria since. She has also lost 80 pounds in the past six months.   Pt c/o hip pain as well as her chronic back and leg pain. No other complaints at this time.     The patient's history is limited by the patient's altered mental status. History provided by daughter present at bedside.     The patient's  arrived to the bedside and told me that she has also been very forgetful lately and will get up in the middle of the night and put a pot of water on the stove and forget she did that and it will boil over.  She also leave the sink running and it has overflowed.  He took the car keys away from her because he is afraid that she will hurt herself or someone else if she drives.  He is wondering if she has dementia or altzheimer's he is requesting a neuro consult while she is in the hospital.  He also stated that she is due for a dressing change of her BLE chronic wounds that she was supposed to get at her podiatrist tomorrow, but will not be able to now that she is admitted.  He is requesting a wound care evaluation for dressing change.      Past Medical History   Diagnosis Date    Allergy     Anxiety     Arthritis     Carotid artery occlusion     Cataract     Depression     Edema      chronic    Fever blister     Hypothyroid     Iron deficiency anemia     Joint pain     Lumbar radiculopathy      with chronic pain    Ocular migraine     Sleep apnea      cpap     Past Medical History Pertinent Negatives   Diagnosis Date Noted    Diabetic retinopathy 1/13/2015    Glaucoma 1/13/2015    Hypertension 1/13/2015    Macular degeneration  1/13/2015    Retinal detachment 1/13/2015    Sickle cell anemia 6/8/2015    Sickle cell trait 6/8/2015    Strabismus 1/13/2015    Uveitis 1/13/2015     Past Surgical History   Procedure Laterality Date    Hysterectomy  1975     endometriosis    Back surgery       x 12    Pain pump placement      Spine surgery  5-13-13     CERVICAL FUSION    Cataract extraction w/  intraocular lens implant Left      Dr Coleman      Family History   Problem Relation Age of Onset    Cancer Mother 55     breast    Cancer Father      esophagus,had laryngectomy    Parkinsonism Maternal Grandmother     Tremor Maternal Grandmother     No Known Problems Brother     No Known Problems Brother     Heart disease Maternal Uncle     No Known Problems Sister     No Known Problems Maternal Aunt     No Known Problems Paternal Aunt     No Known Problems Paternal Uncle     No Known Problems Maternal Grandfather     No Known Problems Paternal Grandmother     No Known Problems Paternal Grandfather     Melanoma Neg Hx     Amblyopia Neg Hx     Blindness Neg Hx     Cataracts Neg Hx     Diabetes Neg Hx     Glaucoma Neg Hx     Hypertension Neg Hx     Macular degeneration Neg Hx     Retinal detachment Neg Hx     Strabismus Neg Hx     Stroke Neg Hx     Thyroid disease Neg Hx      Social History   Substance Use Topics    Smoking status: Never Smoker    Smokeless tobacco: Never Used    Alcohol use No      Comment: denies     Review of Systems   Constitutional: Positive for unexpected weight change (per daughter, 80 lb weight loss in the past 6 months).   Genitourinary: Positive for hematuria (s/p bladder botox procedure).   Musculoskeletal:        (+) Hip pain  (+) Leg and back pain, chronic   Skin: Negative for rash.   Psychiatric/Behavioral: Positive for confusion (AMS).   All other systems reviewed and are negative.      Physical Exam   Initial Vitals   BP Pulse Resp Temp SpO2   02/01/17 1412 02/01/17 1412 02/01/17 1412  02/01/17 1412 02/01/17 1412   126/53 96 18 97.4 °F (36.3 °C) 98 %     Physical Exam     Gen / constitutional: Interactive. No acute distress  Head: Normocephalic, yellowish ecchymosis at right frontal lateral calvarium   Neck: supple, no masses or LAD (see MSK exam for cervical spine details)  Eyes: PERRLA, conjunctiva clear, EOM's intact  Ears, Nose and Throat: No rhinorrhea, no septal hematoma, no malocclusion of the jaw, no instability of the midface or mandible.  Cardiac: Reg rhythm, No murmur  Pulmonary: CTA Bilat, no wheezes, rhonchi, rales. Chest wall stable with no focal tenderness or sub-Q emphysema  GI: Abdomen soft, non-tender, non-distended; no rebound or guarding; suprapubic catheter in place with blood tinged urine in meadows bag  : No CVA tenderness.  Musculoskeletal: Extremities warm, well perfused, no erythema, no edema. No midline C, T or L spine ttp or stepoff. All joints of all extremities ranged w/o pain. Ecchymosis overlying dorsum of left hand, no bony point tenderness to palpation, able to range all joints of left hand without pain (per daughter states this is from a fall a few days ago where she got xray of hand at urgent care and there was no fracture noted).  Skin: No rashes and no seatbelt sign on neck or abdomen.  Neuro: Drowsy, alert and oriented x 2 to person and place, moving all four extremities; No focal motor or sensory deficits.        ED Course   Procedures  Labs Reviewed   CBC W/ AUTO DIFFERENTIAL - Abnormal; Notable for the following:        Result Value    RBC 3.96 (*)     Hemoglobin 11.0 (*)     Hematocrit 34.7 (*)     MCHC 31.7 (*)     All other components within normal limits   COMPREHENSIVE METABOLIC PANEL - Abnormal; Notable for the following:     Potassium 3.3 (*)     CO2 36 (*)     Albumin 3.4 (*)     ALT 8 (*)     Anion Gap 6 (*)     All other components within normal limits   TSH - Abnormal; Notable for the following:     TSH 21.602 (*)     All other components within  normal limits   URINALYSIS - Abnormal; Notable for the following:     Appearance, UA Hazy (*)     Protein, UA Trace (*)     Occult Blood UA 3+ (*)     Nitrite, UA Positive (*)     Leukocytes, UA 3+ (*)     All other components within normal limits   URINALYSIS MICROSCOPIC - Abnormal; Notable for the following:     RBC, UA >100 (*)     WBC, UA >100 (*)     WBC Clumps, UA Many (*)     All other components within normal limits   CULTURE, BLOOD   CULTURE, BLOOD   CULTURE, URINE   LACTIC ACID, PLASMA   LIPASE   DRUG SCREEN PANEL, URINE EMERGENCY   TROPONIN I   CK   ALCOHOL,MEDICAL (ETHANOL)   T4, FREE     Imaging Results         CT Head Without Contrast (Final result) Result time:  02/01/17 18:29:17    Final result by Jenniffer Arenas MD (02/01/17 18:29:17)    Impression:         No acute intracranial abnormality detected.  ______________________________________     Electronically signed by resident: JENNIFFER ARENAS MD  Date:     02/01/17  Time:    18:04            As the supervising and teaching physician, I personally reviewed the images and resident's interpretation and I agree with the findings.            Electronically signed by: MILDRED LAURENT MD  Date:     02/01/17  Time:    18:29     Narrative:    Technique: Axial images of the head were acquired without the administration of contrast.  Sagittal and coronal reformatted images were also obtained.    Clinical indication: Trauma.    Comparison: 1/16/2017.  Findings:    There is no evidence of acute or recent major vascular territory cerebral infarction, parenchymal hemorrhage, or intra-axial mass.  The gray-white matter differentiation is maintained.   There are no extra-axial masses or abnormal fluid collections.  The ventricular system is within normal limits of size for age.  The osseous structures and extracranial soft tissues are unremarkable.  The visualized paranasal sinuses and mastoid air cells are clear.            X-Ray Pelvis Routine AP (Final result)  Result time:  02/01/17 15:34:39    Final result by Leonard Ulloa MD (02/01/17 15:34:39)    Impression:     No acute fracture          Electronically signed by: LEONARD ULLOA MD  Date:     02/01/17  Time:    15:34     Narrative:      Pelvis AP views, comparison left hip 9/27/16. Skeletal structures are intact. Alignment and joint spaces are satisfactory at the hips. No fracture or dislocation is identified. Note is again made of the infusion pump over the right lower quadrant and hardware in the lumbar spine.            X-Ray Chest 1 View (Final result) Result time:  02/01/17 15:37:46    Final result by Kristian Evans Jr., MD (02/01/17 15:37:46)    Narrative:    Chest single view compared to January 6.  Heart size and pulmonary vessels are similar.  Postoperative changes in the spine.  Lungs are fairly well aerated.  No pneumothorax.    Impression no acute abnormality seen.      Electronically signed by: KRISTIAN EVANS MD  Date:     02/01/17  Time:    15:37                 EKG Readings: (Independently Interpreted)   EKG: Sinus bradycardia at 48, no BRENDA's or STD's, non-specific twave pattern, no STEMI            Medical Decision Making:   History:   Old Medical Records: I decided to obtain old medical records.  Independently Interpreted Test(s):   I have ordered and independently interpreted EKG Reading(s) - see prior notes    Clinical Tests:  Labs Test(s) were ordered and reviewed by me.  Radiological study(s) were ordered and reviewed by me.  Medical test(s) were ordered and reviewed by me.    Pt presents with AMS and after an unwitnessed ground level fall. I considered unintentional narcotic overdose, stroke, sepsis, UTI, PNA, cardiac dysrhythmia, traumatic head injury, fracture or dislocation, among other diagnoses. Plan to get CT head, CXR, labs, and urine and will reevaluate.     Labs consistent with UTI which I felt was the cause of her AMS and likely the cause of her fall at home. Gave ceftriaxone.  Will observe on the medicine service for treatment of UTI and PT/OT evaluation. CT negative for ICH.     The patient's  arrived to the bedside and told me that she has also been very forgetful lately and will get up in the middle of the night and put a pot of water on the stove and forget she did that and it will boil over.  She also leave the sink running and it has overflowed.  He took the car keys away from her because he is afraid that she will hurt herself or someone else if she drives.  He is wondering if she has dementia or altzheimer's he is requesting a neuro consult while she is in the hospital.  He also stated that she is due for a dressing change of her BLE chronic wounds that she was supposed to get at her podiatrist tomorrow, but will not be able to now that she is admitted.  He is requesting a wound care evaluation for dressing change.              Scribe Attestation:   Scribe #1: I performed the above scribed service and the documentation accurately describes the services I performed. I attest to the accuracy of the note.    Attending Attestation:           Physician Attestation for Scribe:  Physician Attestation Statement for Scribe #1: I, Dr. Alvarez, reviewed documentation, as scribed by Annel Trejo in my presence, and it is both accurate and complete.                 ED Course     Clinical Impression:   The primary encounter diagnosis was Acute cystitis with hematuria. Diagnoses of Trauma and Fall, initial encounter were also pertinent to this visit.          Pete Alvarez MD  02/01/17 [id number]

## 2017-02-01 NOTE — ED NOTES
initial call to EMS was because of a fall after losing her balance. pt found on floor by EMS. had cystoscopy Thursday. catheter to leg bag present with bright red blood. on Plavix. pt found to be altered also. with hx of narcotic dependency. given Narcan 0.5 by EMS. with some wakefulness afterwards.    Pt identifiers Tashacarlos Hawley were checked and correct  LOC: The patient is awake, lethargic, aware of environment with an appropriate affect. Oriented x3, speaking appropriately, pt slow to respond.   APPEARANCE: Pt resting comfortably, in no acute distress, pt is clean and well groomed, clothing properly fastened  SKIN: Skin warm, dry and intact, normal skin turgor, moist mucus membranes  RESPIRATORY: Airway is open and patent, respirations are spontaneous, even and unlabored, normal effort and rate  CARDIAC: Normal rate and rhythm, no peripheral edema noted, capillary refill < 3 seconds, bilateral radial pulses 2+  ABDOMEN: Soft, non tender, non distended. Bowel sounds present x 4 quadrants.    NEUROLOGIC: PERRLA, facial expression is symmetrical, patient moving all extremities spontaneously, normal sensation in all extremities when touched with a finger.  Follows all commands appropriately  MUSCULOSKELETAL: No obvious deformities.

## 2017-02-02 PROBLEM — E03.9 PRIMARY HYPOTHYROIDISM: Status: ACTIVE | Noted: 2017-02-02

## 2017-02-02 LAB
AFP SERPL-MCNC: 2 NG/ML
ANION GAP SERPL CALC-SCNC: 7 MMOL/L
ANION GAP SERPL CALC-SCNC: 7 MMOL/L
BACTERIA #/AREA URNS AUTO: ABNORMAL /HPF
BASOPHILS # BLD AUTO: 0.01 K/UL
BASOPHILS # BLD AUTO: 0.01 K/UL
BASOPHILS NFR BLD: 0.2 %
BASOPHILS NFR BLD: 0.2 %
BILIRUB UR QL STRIP: NEGATIVE
BUN SERPL-MCNC: 11 MG/DL
BUN SERPL-MCNC: 11 MG/DL
CALCIUM SERPL-MCNC: 8.5 MG/DL
CALCIUM SERPL-MCNC: 8.5 MG/DL
CANCER AG125 SERPL-ACNC: 13 U/ML
CHLORIDE SERPL-SCNC: 103 MMOL/L
CHLORIDE SERPL-SCNC: 103 MMOL/L
CLARITY UR REFRACT.AUTO: ABNORMAL
CO2 SERPL-SCNC: 30 MMOL/L
CO2 SERPL-SCNC: 30 MMOL/L
COLOR UR AUTO: YELLOW
CREAT SERPL-MCNC: 0.7 MG/DL
CREAT SERPL-MCNC: 0.7 MG/DL
DIFFERENTIAL METHOD: ABNORMAL
DIFFERENTIAL METHOD: ABNORMAL
EOSINOPHIL # BLD AUTO: 0.2 K/UL
EOSINOPHIL # BLD AUTO: 0.2 K/UL
EOSINOPHIL NFR BLD: 4.5 %
EOSINOPHIL NFR BLD: 4.5 %
ERYTHROCYTE [DISTWIDTH] IN BLOOD BY AUTOMATED COUNT: 13.9 %
ERYTHROCYTE [DISTWIDTH] IN BLOOD BY AUTOMATED COUNT: 13.9 %
EST. GFR  (AFRICAN AMERICAN): >60 ML/MIN/1.73 M^2
EST. GFR  (AFRICAN AMERICAN): >60 ML/MIN/1.73 M^2
EST. GFR  (NON AFRICAN AMERICAN): >60 ML/MIN/1.73 M^2
EST. GFR  (NON AFRICAN AMERICAN): >60 ML/MIN/1.73 M^2
GLUCOSE SERPL-MCNC: 92 MG/DL
GLUCOSE SERPL-MCNC: 92 MG/DL
GLUCOSE UR QL STRIP: NEGATIVE
HCG INTACT+B SERPL-ACNC: <1.2 MIU/ML
HCT VFR BLD AUTO: 31.3 %
HCT VFR BLD AUTO: 31.3 %
HGB BLD-MCNC: 9.9 G/DL
HGB BLD-MCNC: 9.9 G/DL
HGB UR QL STRIP: ABNORMAL
HYALINE CASTS UR QL AUTO: 2 /LPF
KETONES UR QL STRIP: NEGATIVE
LEUKOCYTE ESTERASE UR QL STRIP: ABNORMAL
LYMPHOCYTES # BLD AUTO: 1.5 K/UL
LYMPHOCYTES # BLD AUTO: 1.5 K/UL
LYMPHOCYTES NFR BLD: 36.2 %
LYMPHOCYTES NFR BLD: 36.2 %
MAGNESIUM SERPL-MCNC: 1.8 MG/DL
MCH RBC QN AUTO: 28.2 PG
MCH RBC QN AUTO: 28.2 PG
MCHC RBC AUTO-ENTMCNC: 31.6 %
MCHC RBC AUTO-ENTMCNC: 31.6 %
MCV RBC AUTO: 89 FL
MCV RBC AUTO: 89 FL
MICROSCOPIC COMMENT: ABNORMAL
MONOCYTES # BLD AUTO: 0.5 K/UL
MONOCYTES # BLD AUTO: 0.5 K/UL
MONOCYTES NFR BLD: 10.6 %
MONOCYTES NFR BLD: 10.6 %
NEUTROPHILS # BLD AUTO: 2.1 K/UL
NEUTROPHILS # BLD AUTO: 2.1 K/UL
NEUTROPHILS NFR BLD: 48.5 %
NEUTROPHILS NFR BLD: 48.5 %
NITRITE UR QL STRIP: POSITIVE
PH UR STRIP: 7 [PH] (ref 5–8)
PHOSPHATE SERPL-MCNC: 3 MG/DL
PLATELET # BLD AUTO: 220 K/UL
PLATELET # BLD AUTO: 220 K/UL
PMV BLD AUTO: 10.1 FL
PMV BLD AUTO: 10.1 FL
POTASSIUM SERPL-SCNC: 3.6 MMOL/L
POTASSIUM SERPL-SCNC: 3.6 MMOL/L
PREALB SERPL-MCNC: 13 MG/DL
PROT UR QL STRIP: ABNORMAL
RBC # BLD AUTO: 3.51 M/UL
RBC # BLD AUTO: 3.51 M/UL
RBC #/AREA URNS AUTO: >100 /HPF (ref 0–4)
SODIUM SERPL-SCNC: 140 MMOL/L
SODIUM SERPL-SCNC: 140 MMOL/L
SP GR UR STRIP: 1.02 (ref 1–1.03)
URN SPEC COLLECT METH UR: ABNORMAL
UROBILINOGEN UR STRIP-ACNC: NEGATIVE EU/DL
WBC # BLD AUTO: 4.23 K/UL
WBC # BLD AUTO: 4.23 K/UL
WBC #/AREA URNS AUTO: >100 /HPF (ref 0–5)
WBC CLUMPS UR QL AUTO: ABNORMAL

## 2017-02-02 PROCEDURE — G8978 MOBILITY CURRENT STATUS: HCPCS | Mod: CK

## 2017-02-02 PROCEDURE — 86304 IMMUNOASSAY TUMOR CA 125: CPT

## 2017-02-02 PROCEDURE — G8989 SELF CARE D/C STATUS: HCPCS | Mod: CK

## 2017-02-02 PROCEDURE — 25000003 PHARM REV CODE 250: Performed by: PHYSICIAN ASSISTANT

## 2017-02-02 PROCEDURE — 84702 CHORIONIC GONADOTROPIN TEST: CPT

## 2017-02-02 PROCEDURE — G8988 SELF CARE GOAL STATUS: HCPCS | Mod: CJ

## 2017-02-02 PROCEDURE — 63600175 PHARM REV CODE 636 W HCPCS: Performed by: PHYSICIAN ASSISTANT

## 2017-02-02 PROCEDURE — G8979 MOBILITY GOAL STATUS: HCPCS | Mod: CJ

## 2017-02-02 PROCEDURE — 97116 GAIT TRAINING THERAPY: CPT

## 2017-02-02 PROCEDURE — 63600175 PHARM REV CODE 636 W HCPCS: Performed by: NURSE PRACTITIONER

## 2017-02-02 PROCEDURE — 99225 PR SUBSEQUENT OBSERVATION CARE,LEVEL II: CPT | Mod: GC,,, | Performed by: INTERNAL MEDICINE

## 2017-02-02 PROCEDURE — 84134 ASSAY OF PREALBUMIN: CPT

## 2017-02-02 PROCEDURE — 82308 ASSAY OF CALCITONIN: CPT

## 2017-02-02 PROCEDURE — 97802 MEDICAL NUTRITION INDIV IN: CPT

## 2017-02-02 PROCEDURE — 36415 COLL VENOUS BLD VENIPUNCTURE: CPT

## 2017-02-02 PROCEDURE — 84100 ASSAY OF PHOSPHORUS: CPT

## 2017-02-02 PROCEDURE — 97165 OT EVAL LOW COMPLEX 30 MIN: CPT

## 2017-02-02 PROCEDURE — 25000003 PHARM REV CODE 250: Performed by: NURSE PRACTITIONER

## 2017-02-02 PROCEDURE — 97535 SELF CARE MNGMENT TRAINING: CPT

## 2017-02-02 PROCEDURE — 85025 COMPLETE CBC W/AUTO DIFF WBC: CPT

## 2017-02-02 PROCEDURE — 82105 ALPHA-FETOPROTEIN SERUM: CPT

## 2017-02-02 PROCEDURE — G0378 HOSPITAL OBSERVATION PER HR: HCPCS

## 2017-02-02 PROCEDURE — G8980 MOBILITY D/C STATUS: HCPCS | Mod: CK

## 2017-02-02 PROCEDURE — 80048 BASIC METABOLIC PNL TOTAL CA: CPT

## 2017-02-02 PROCEDURE — 97162 PT EVAL MOD COMPLEX 30 MIN: CPT

## 2017-02-02 PROCEDURE — 99226 PR SUBSEQUENT OBSERVATION CARE,LEVEL III: CPT | Mod: ,,, | Performed by: PHYSICIAN ASSISTANT

## 2017-02-02 PROCEDURE — 83735 ASSAY OF MAGNESIUM: CPT

## 2017-02-02 PROCEDURE — 99220 PR INITIAL OBSERVATION CARE,LEVL III: CPT | Mod: ,,, | Performed by: NURSE PRACTITIONER

## 2017-02-02 PROCEDURE — G8987 SELF CARE CURRENT STATUS: HCPCS | Mod: CK

## 2017-02-02 RX ORDER — HYDROCODONE BITARTRATE AND ACETAMINOPHEN 10; 325 MG/1; MG/1
1 TABLET ORAL DAILY PRN
Status: DISCONTINUED | OUTPATIENT
Start: 2017-02-02 | End: 2017-02-03 | Stop reason: HOSPADM

## 2017-02-02 RX ORDER — ENOXAPARIN SODIUM 100 MG/ML
40 INJECTION SUBCUTANEOUS EVERY 24 HOURS
Status: DISCONTINUED | OUTPATIENT
Start: 2017-02-02 | End: 2017-02-03 | Stop reason: HOSPADM

## 2017-02-02 RX ORDER — LIDOCAINE 50 MG/G
1 PATCH TOPICAL
Status: DISCONTINUED | OUTPATIENT
Start: 2017-02-02 | End: 2017-02-03 | Stop reason: HOSPADM

## 2017-02-02 RX ORDER — ALPRAZOLAM 0.25 MG/1
0.25 TABLET ORAL DAILY PRN
Status: DISCONTINUED | OUTPATIENT
Start: 2017-02-02 | End: 2017-02-03 | Stop reason: HOSPADM

## 2017-02-02 RX ORDER — NAPROXEN 500 MG/1
500 TABLET ORAL 2 TIMES DAILY PRN
Status: DISCONTINUED | OUTPATIENT
Start: 2017-02-02 | End: 2017-02-03 | Stop reason: HOSPADM

## 2017-02-02 RX ORDER — LEVOTHYROXINE SODIUM 100 UG/1
100 TABLET ORAL
Status: DISCONTINUED | OUTPATIENT
Start: 2017-02-03 | End: 2017-02-03 | Stop reason: HOSPADM

## 2017-02-02 RX ADMIN — ACETAMINOPHEN 650 MG: 325 TABLET ORAL at 06:02

## 2017-02-02 RX ADMIN — LISINOPRIL 2.5 MG: 2.5 TABLET ORAL at 08:02

## 2017-02-02 RX ADMIN — ENOXAPARIN SODIUM 40 MG: 100 INJECTION SUBCUTANEOUS at 11:02

## 2017-02-02 RX ADMIN — HYDROCODONE BITARTRATE AND ACETAMINOPHEN 1 TABLET: 10; 325 TABLET ORAL at 11:02

## 2017-02-02 RX ADMIN — CLOPIDOGREL 75 MG: 75 TABLET, FILM COATED ORAL at 08:02

## 2017-02-02 RX ADMIN — BUPROPION HYDROCHLORIDE 300 MG: 300 TABLET, FILM COATED, EXTENDED RELEASE ORAL at 08:02

## 2017-02-02 RX ADMIN — STANDARDIZED SENNA CONCENTRATE AND DOCUSATE SODIUM 1 TABLET: 8.6; 5 TABLET, FILM COATED ORAL at 08:02

## 2017-02-02 RX ADMIN — CEFTRIAXONE 1 G: 1 INJECTION, SOLUTION INTRAVENOUS at 05:02

## 2017-02-02 RX ADMIN — LIDOCAINE 1 PATCH: 50 PATCH TOPICAL at 05:02

## 2017-02-02 RX ADMIN — FLUOXETINE 40 MG: 20 CAPSULE ORAL at 09:02

## 2017-02-02 RX ADMIN — OXYBUTYNIN CHLORIDE 10 MG: 5 TABLET, EXTENDED RELEASE ORAL at 09:02

## 2017-02-02 RX ADMIN — ACETAMINOPHEN 650 MG: 325 TABLET ORAL at 08:02

## 2017-02-02 RX ADMIN — ALPRAZOLAM 0.25 MG: 0.25 TABLET ORAL at 11:02

## 2017-02-02 RX ADMIN — ATORVASTATIN CALCIUM 80 MG: 20 TABLET, FILM COATED ORAL at 08:02

## 2017-02-02 RX ADMIN — POTASSIUM CHLORIDE 20 MEQ: 1500 TABLET, EXTENDED RELEASE ORAL at 08:02

## 2017-02-02 RX ADMIN — ACETAMINOPHEN 650 MG: 325 TABLET ORAL at 01:02

## 2017-02-02 RX ADMIN — POTASSIUM CHLORIDE 40 MEQ: 1500 TABLET, EXTENDED RELEASE ORAL at 01:02

## 2017-02-02 RX ADMIN — PANTOPRAZOLE SODIUM 40 MG: 40 TABLET, DELAYED RELEASE ORAL at 09:02

## 2017-02-02 RX ADMIN — LEVOTHYROXINE SODIUM 125 MCG: 25 TABLET ORAL at 06:02

## 2017-02-02 RX ADMIN — FUROSEMIDE 60 MG: 40 TABLET ORAL at 08:02

## 2017-02-02 RX ADMIN — ASPIRIN 81 MG: 81 TABLET, COATED ORAL at 08:02

## 2017-02-02 NOTE — ASSESSMENT & PLAN NOTE
- reduce LT4 to 100 mcg per endo reccs, patient taking medication irregularly and contributing to lab findings. Education provided to patient. Follow up outpatient.

## 2017-02-02 NOTE — PROGRESS NOTES
"[facility] Hospital Medicine  Progress Note    Patient Name: Tasha Hawley  MRN: [id number]  Patient Class: OP- Observation   Admission Date: 2/1/2017  Length of Stay: 0 days  Attending Physician: Barrera Coppola MD  Primary Care Provider: Mesfin Hodges Ii, MD    [facility] Medicine Team: [facility] E Tomer Puckett PA-C    Subjective:     Principal Problem:Acute cystitis with hematuria    HPI:  Patient is a 61 y.o. female with significant past medical history of CAD, CHF, chronic back pain and neurogenic bladder presented to hospital complaining of fall and decreased level of consciousness. Pt was found down by a family member "unconsciousness," unknown down time. Per chart review, both PCP and psych have documented lethargy, frequent falls & polypharmacy dating back to November. Pt has SQ dilaudid pump (last adjusted ~ talia), as well as norco for break through pain and daily xanax (decreased recently from BID).   Pt incidentally also recently had a suprapubic catheter exchange (1/23) followed by cystoscopy and botox injection (1/26) for urethral incontinence. Pt reports that she has had hematuria since procedure was done.   The patient currently complains of chronic lower back pain radiating to bilateral lower extremities and constipation (LBM last week, although states it has been "weeks" since her last good BM). The patient also endorses an unintentional 60lb weight loss over the last month.     Work up in the ED revealed a normocytic anemia (11/35), grossly normal chemistry with exception of mild hypokalemia (3.3), Ck and troponin were WNL. TSH was elevated at 22, however Free T4 is WNL. ETOH was neg. Utox was positive for bzo, opiates and barbituates. UA was grossly positive. CT was neg for acute intracranial abnormality. CXRY was neg for acute cardiopulmonary dz. Pelvic xray was neg for acute fracture. The patient was admitted to  OBS for further management.       Hospital " Course:  Patient placed into observation for evaluation of decreased level of consciousness that responded to narcan by EMS. Patient states she has been stable on pain medications for a while and does not abuse her medications. She has been requesting additional pain medications while here (we have held her opiate breakthrough pain medications while in house), however, with her superimposed UTI she may be increasingly sensitive to the additional medications. She was explained the concern regarding another possible overdose episode and voiced agreement that she can resume those medications once she is adequately treated for her UTI. Pain management was consulted, but they are unable to modify her pain pump at this time and recommended she follow up with her chronic pain physician for modification. Endocrinology was consulted for elevated TSH at 22 and free T4 1.0. They recommend patient decrease her LT4 to 100 mcg daily. Patient states she is non-compliant at times and takes medications irregularly other times. Awaiting cultures prior to discharge.     Interval History: Explained to patient why we are withholding breakthrough pain medications and we wish to avoid another accidental overdose of pain medications. She voiced understanding, will use lidoderm, naproxen and tylenol for breakthrough pain while being treated for UTI. PT/OT recc SNF, CM to discuss with patient. Awaiting urine cultures.    Review of Systems   Constitutional: Positive for fatigue. Negative for chills and fever.   Respiratory: Negative for cough and shortness of breath.    Cardiovascular: Negative for chest pain and palpitations.   Gastrointestinal: Negative for abdominal distention and abdominal pain.   Genitourinary: Positive for hematuria.   Musculoskeletal: Positive for back pain and gait problem.   Neurological: Positive for weakness. Negative for headaches.     Objective:     Vital Signs (Most Recent):  Temp: 97.7 °F (36.5 °C) (02/02/17  1134)  Pulse: 62 (02/02/17 1300)  Resp: 17 (02/02/17 1134)  BP: (!) 114/53 (02/02/17 1134)  SpO2: (!) 94 % (02/02/17 1334) Vital Signs (24h Range):  Temp:  [97.7 °F (36.5 °C)-97.9 °F (36.6 °C)] 97.7 °F (36.5 °C)  Pulse:  [50-74] 62  Resp:  [15-17] 17  SpO2:  [94 %-100 %] 94 %  BP: (102-133)/(53-75) 114/53     Weight: 76.1 kg (167 lb 12.3 oz)  Body mass index is 28.8 kg/(m^2).    Intake/Output Summary (Last 24 hours) at 02/02/17 1457  Last data filed at 02/02/17 0900   Gross per 24 hour   Intake             1200 ml   Output             2650 ml   Net            -1450 ml      Physical Exam   Constitutional: She is oriented to person, place, and time. No distress.   HENT:   Mouth/Throat: Mucous membranes are dry.   Eyes: EOM are normal.   Cardiovascular: Normal rate and regular rhythm.    Pulmonary/Chest: Effort normal and breath sounds normal. No respiratory distress. She has no wheezes.   Abdominal: Soft. Bowel sounds are normal. She exhibits no distension. There is no tenderness.   Musculoskeletal: She exhibits edema and tenderness.   Neurological: She is alert and oriented to person, place, and time. No cranial nerve deficit.   Nursing note and vitals reviewed.      Significant Labs:   CBC:   Recent Labs  Lab 02/01/17  1510 02/02/17  0459   WBC 4.82 4.23  4.23   HGB 11.0* 9.9*  9.9*   HCT 34.7* 31.3*  31.3*    220  220     CMP:   Recent Labs  Lab 02/01/17  1510 02/02/17  0459    140  140   K 3.3* 3.6  3.6   CL 99 103  103   CO2 36* 30*  30*   GLU 84 92  92   BUN 10 11  11   CREATININE 0.7 0.7  0.7   CALCIUM 9.1 8.5*  8.5*   PROT 7.1  --    ALBUMIN 3.4*  --    BILITOT 0.5  --    ALKPHOS 124  --    AST 16  --    ALT 8*  --    ANIONGAP 6* 7*  7*   EGFRNONAA >60.0 >60.0  >60.0     Urine Culture: No results for input(s): LABURIN in the last 48 hours.  All pertinent labs within the past 24 hours have been reviewed.    Significant Imaging: I have reviewed all pertinent imaging results/findings  within the past 24 hours.    Assessment/Plan:      * Acute cystitis with hematuria  - suprapubic catheter present (last changed 1/23) & s/p cystoscopy & botox injection 1/26  - UA with occasional bacteria, > 100 WBCx, 3+ leuks & + nitrites  - received x1 dose rocephin in ED  - unclear if specimen collected was from leg bag or directly from catheter > will repeat UA/Ucx on specimen directly from catheter, patient states that her urine has been increasingly darkened and had become bloody recently, repeat UA +UTI  - Will continue abx and wait cultures    Neurogenic bladder  - s/p cystoscopy & botox injection 1/26  - continue home levsin and detrol   - no gross hematuria on exam, H/H stable, will continue dual antiplatelet therapy    Lumbar radiculopathy  - pain plan as above  - PT/OT    Chronic pain  - pain plan as above    Narcotic dependency, continuous  - dilaudid pump SQ > pain management consult as above  - Breakthrough with norco 10 once daily PRN, naproxen, tylenol, lidoderm  - narcan prn    Lethargy  - suspect 2/2 polypharmacy. Multiple PCP & psych visits document lethargy & frequent falls in HPI dating back to November with suspected polypharmacy as etiology (pt on xanax & opioids); xanax recently decreased from BID to daily   - pt required dose of narcan per EMS with positive response  - will hold home norco, restart xanax  - pt has SQ dilaudid infusion pump > pain management consulted, down-titration will have to be conducted by chronic pain management outpatient  - narcan prn  - possible etiology of subclinical hypothyroidism (TSH 22, Free T4 1) > endocrine consulted, likely 2/2 inconsistent regimen, decrease to 100 mcg and monitor outpatient  - head CT neg for acute intracranial abnormality > unable to obtain MRI 2/2 infusion pump    Subclinical iodine-deficiency hypothyroidism  - TSH 22, free T4 1  - continue home synthroid  - endocrine consulted: reduce LT4 to 100 mcg per endo reccs, patient taking  medication irregularly and contributing to lab findings. Education provided to patient. Follow up outpatient.     Fall  - suspect 2/2 polypharmacy as above  - plan as above  - endocrine consult as above  - PT/OT eval: recommending SNF, CM/SW to discuss with patient, patient already with HH    Anxiety  - initially holding home xanax in setting of lethargy  - patient states she takes mostly in AM and occasionally at night, will resume as patient is more alert    Iron deficiency anemia  - H/H 11/35 > monitor    Combined systolic and diastolic cardiac dysfunction  - last 2D echo & cath 1/2016 (see associated reports)  - check 2D echo in setting of recent falls and likely new onset murmur  - continue home lisinopril    Constipation  - LBM last week, patient with BM while in hospital  - suspect 2/2 opioid use & possible subclinical hypothyroidism  - continue aggressive bowel regimen    GERD (gastroesophageal reflux disease)  - continue home PPI    Coronary artery disease involving native coronary artery of native heart without angina pectoris  - continue home dual antiplatelet therapy, ACE and statin    Weight loss, unintentional  - unclear etiology; possibly 2/2 increased lethargy resulting in decreased PO intake   - dietary consult: Boost BID  - check tumor markers to r/o underlying ca: AFP negative,  negative    VTE Risk Mitigation         Ordered     enoxaparin injection 40 mg  Daily     Route:  Subcutaneous        02/02/17 0029          Tomer Puckett PA-C  Department of Hospital Medicine   [facility]

## 2017-02-02 NOTE — ASSESSMENT & PLAN NOTE
- suspect 2/2 polypharmacy as above  - plan as above  - endocrine consult as above  - PT/OT eval

## 2017-02-02 NOTE — SUBJECTIVE & OBJECTIVE
Interval History: Explained to patient why we are withholding breakthrough pain medications and we wish to avoid another accidental overdose of pain medications. She voiced understanding, will use lidoderm, naproxen and tylenol for breakthrough pain while being treated for UTI. PT/OT recc SNF, CM to discuss with patient. Awaiting urine cultures.    Review of Systems   Constitutional: Positive for fatigue. Negative for chills and fever.   Respiratory: Negative for cough and shortness of breath.    Cardiovascular: Negative for chest pain and palpitations.   Gastrointestinal: Negative for abdominal distention and abdominal pain.   Genitourinary: Positive for hematuria.   Musculoskeletal: Positive for back pain and gait problem.   Neurological: Positive for weakness. Negative for headaches.     Objective:     Vital Signs (Most Recent):  Temp: 97.7 °F (36.5 °C) (02/02/17 1134)  Pulse: 62 (02/02/17 1300)  Resp: 17 (02/02/17 1134)  BP: (!) 114/53 (02/02/17 1134)  SpO2: (!) 94 % (02/02/17 1334) Vital Signs (24h Range):  Temp:  [97.7 °F (36.5 °C)-97.9 °F (36.6 °C)] 97.7 °F (36.5 °C)  Pulse:  [50-74] 62  Resp:  [15-17] 17  SpO2:  [94 %-100 %] 94 %  BP: (102-133)/(53-75) 114/53     Weight: 76.1 kg (167 lb 12.3 oz)  Body mass index is 28.8 kg/(m^2).    Intake/Output Summary (Last 24 hours) at 02/02/17 1457  Last data filed at 02/02/17 0900   Gross per 24 hour   Intake             1200 ml   Output             2650 ml   Net            -1450 ml      Physical Exam   Constitutional: She is oriented to person, place, and time. No distress.   HENT:   Mouth/Throat: Mucous membranes are dry.   Eyes: EOM are normal.   Cardiovascular: Normal rate and regular rhythm.    Pulmonary/Chest: Effort normal and breath sounds normal. No respiratory distress. She has no wheezes.   Abdominal: Soft. Bowel sounds are normal. She exhibits no distension. There is no tenderness.   Musculoskeletal: She exhibits edema and tenderness.   Neurological: She is  alert and oriented to person, place, and time. No cranial nerve deficit.   Nursing note and vitals reviewed.      Significant Labs:   CBC:   Recent Labs  Lab 02/01/17  1510 02/02/17  0459   WBC 4.82 4.23  4.23   HGB 11.0* 9.9*  9.9*   HCT 34.7* 31.3*  31.3*    220  220     CMP:   Recent Labs  Lab 02/01/17  1510 02/02/17  0459    140  140   K 3.3* 3.6  3.6   CL 99 103  103   CO2 36* 30*  30*   GLU 84 92  92   BUN 10 11  11   CREATININE 0.7 0.7  0.7   CALCIUM 9.1 8.5*  8.5*   PROT 7.1  --    ALBUMIN 3.4*  --    BILITOT 0.5  --    ALKPHOS 124  --    AST 16  --    ALT 8*  --    ANIONGAP 6* 7*  7*   EGFRNONAA >60.0 >60.0  >60.0     Urine Culture: No results for input(s): LABURIN in the last 48 hours.  All pertinent labs within the past 24 hours have been reviewed.    Significant Imaging: I have reviewed all pertinent imaging results/findings within the past 24 hours.

## 2017-02-02 NOTE — ASSESSMENT & PLAN NOTE
- unclear etiology; possibly 2/2 increased lethargy resulting in decreased PO intake   - dietary consult: Boost BID  - check tumor markers to r/o underlying ca: AFP negative,  negative

## 2017-02-02 NOTE — NURSING
Complained of pain and needing medication stronger than acetaminophen.  Also complained of constipation and reported not having BM x 2 wks.  NP made aware via spok message.  Patient's  left, and RN found patient up in room with very unsteady gait.  Pt had also removed her telemetry.  Pt stated she had been to bathroom and had BM.  Assisted to bed and bed alarm set.  Fall precautions reviewed with patient again.  Appetite good.  BLE are wrappedto toes.  Patient describes sx of PVD.  States seeing podiatrist Dr. Mcguire  Next appointment was for 2/2..

## 2017-02-02 NOTE — ASSESSMENT & PLAN NOTE
- LBM last week, patient with BM while in hospital  - suspect 2/2 opioid use & possible subclinical hypothyroidism  - continue aggressive bowel regimen

## 2017-02-02 NOTE — ASSESSMENT & PLAN NOTE
- LBM last week  - suspect 2/2 opioid use & possible subclinical hypothyroidism  - aggressive bowel regimen

## 2017-02-02 NOTE — ASSESSMENT & PLAN NOTE
- s/p cystoscopy & botox injection 1/26  - continue home levsin and detrol   - no gross hematuria on exam, H/H stable, will continue dual antiplatelet therapy

## 2017-02-02 NOTE — ASSESSMENT & PLAN NOTE
- suprapubic catheter present (last changed 1/23) & s/p cystoscopy & botox injection 1/26  - UA with occasional bacteria, > 100 WBCx, 3+ leuks & + nitrites  - received x1 dose rocephin in ED  - unclear if specimen collected was from leg bag or directly from catheter > will repeat UA/Ucx on specimen directly from catheter  - continue abx if repeat UA still concerning for infection

## 2017-02-02 NOTE — ASSESSMENT & PLAN NOTE
Hx of non-compliance with medications.   Weight based dose should be around: 121, however in elderly with CAD should be less --> ideally should be around 88 mcg   Suspect high dose required due to absorption issues, pt taking LT4 with other medications and food.   Need to monitor for iatrogenic hyperthyroidism if pt is compliant with  levothyroxine 125 mcg   Recommend decreasing to 100 mcg and to check as outpatient.    Will sign off at this time, please call with any questions.

## 2017-02-02 NOTE — ASSESSMENT & PLAN NOTE
- dilaudid pump SQ > pain management consult as above  - Breakthrough with norco 10 once daily PRN, naproxen, tylenol, lidoderm  - narcan prn

## 2017-02-02 NOTE — PLAN OF CARE
Mesfin Hodges Ii, MD  Future Appointments  Date Time Provider Department Center   [facility]2017 9[facility] [address]   2/7/2017 10:00 AM Mesfin Hodges II, MD [facility] [address] [facility]   2/15/2017 9:30 AM Erik Oconnor MD [facility] [id number] - [facility], [place] - [address]  [address] [address]  Phone: [phone] Fax: [phone]    Payor: [employer] MEDICARE / Plan: [employer] MEDICARE HMO / Product Type: Capitation /        02/02/17 0910   Discharge Assessment   Assessment Type Discharge Planning Assessment   Confirmed/corrected address and phone number on facesheet? Yes   Assessment information obtained from? Patient   Expected Length of Stay (days) 2   Communicated expected length of stay with patient/caregiver yes   Prior to hospitilization cognitive status: Alert/Oriented   Prior to hospitalization functional status: Independent   Current cognitive status: Alert/Oriented   Current Functional Status: Independent   Arrived From home or self-care;home health   Lives With spouse   Able to Return to Prior Arrangements yes   Is patient able to care for self after discharge? Yes   Who are your caregiver(s) and their phone number(s)? Dangelo Hawley  spouse [phone]   Patient's perception of discharge disposition home health   Patient currently receives home health services? Yes   Patient previously received home health services and would like to resume services if necessary? Yes   If yes, name of home health provider: [facility]   Equipment Currently Used at Home rollator;cane, straight;bath bench  (pain pump)   Does the patient have transportation to healthcare appointments? Yes   Transportation Available family or friend will provide   On Dialysis? No   Discharge Plan A Home Health   Discharge Plan B Home with family   Patient/Family In Agreement With Plan yes

## 2017-02-02 NOTE — SUBJECTIVE & OBJECTIVE
Past Medical History   Diagnosis Date    Allergy     Anxiety     Arthritis     Carotid artery occlusion     Cataract     Depression     Edema      chronic    Fever blister     Hypothyroid     Iron deficiency anemia     Joint pain     Lumbar radiculopathy      with chronic pain    Ocular migraine     Sleep apnea      cpap       Past Surgical History   Procedure Laterality Date    Hysterectomy  1975     endometriosis    Back surgery       x 12    Pain pump placement      Spine surgery  5-13-13     CERVICAL FUSION    Cataract extraction w/  intraocular lens implant Left      Dr Coleman        Review of patient's allergies indicates:   Allergen Reactions    Imitrex [sumatriptan succinate] Shortness Of Breath    Penicillins Shortness Of Breath     Other reaction(s): Jittery    Percocet [oxycodone-acetaminophen] Anaphylaxis    Topamax [topiramate] Shortness Of Breath    Vancomycin Shortness Of Breath     Rash    (d)-limonene flavor      Other reaction(s): difficult intubation  Other reaction(s): Jittery  Other reaction(s): Difficulty breathing  Other reaction(s): Difficulty breathing  Other reaction(s): Jittery  Other reaction(s): Difficulty breathing    Bactrim [sulfamethoxazole-trimethoprim] Nausea And Vomiting     Other reaction(s): Resp Depression  Other reaction(s): Anaphylaxis    Butorphanol tartrate     Darvocet a500 [propoxyphene n-acetaminophen]      Other reaction(s): Jittery  Other reaction(s): Difficulty breathing    Fentanyl      Other reaction(s): Vomiting  Other reaction(s): Nausea  Other reaction(s): Itching  swelling    Phenytoin sodium extended      pATIENT DENIES EVER HAVING THIS MEDICATION    White petrolatum-zinc     Zinc oxide-white petrolatum      Other reaction(s): Difficulty breathing    Latex, natural rubber Itching and Rash       No current facility-administered medications on file prior to encounter.      Current Outpatient Prescriptions on File Prior to  Encounter   Medication Sig    alprazolam (XANAX) 0.25 MG tablet Take 1 tablet (0.25 mg total) by mouth once daily.    aspirin (ECOTRIN) 81 MG EC tablet Take 1 tablet (81 mg total) by mouth once daily.    atorvastatin (LIPITOR) 80 MG tablet Take 1 tablet (80 mg total) by mouth once daily. (Patient taking differently: Take 80 mg by mouth every evening. )    buPROPion (WELLBUTRIN XL) 150 MG TB24 tablet Take 2 tablets (300 mg total) by mouth once daily.    BUTALBITAL/ASPIRIN/CAFFEINE (FIORINAL ORAL) Take by mouth as needed.    clopidogrel (PLAVIX) 75 mg tablet Take 1 tablet (75 mg total) by mouth once daily.    furosemide (LASIX) 40 MG tablet Take 1 tablet (40 mg total) by mouth 2 (two) times daily. (Patient taking differently: Take 60 mg by mouth once daily. Patient states she is taking 1.5 daily)    hyoscyamine (ANASPAZ,LEVSIN) 0.125 mg Tab Take 1 tablet (125 mcg total) by mouth every 6 (six) hours as needed.    levothyroxine (SYNTHROID) 125 MCG tablet Take 1 tablet (125 mcg total) by mouth once daily.    lisinopril (PRINIVIL,ZESTRIL) 2.5 MG tablet Take 1 tablet (2.5 mg total) by mouth once daily.    ondansetron (ZOFRAN) 8 MG tablet Take 1 tablet (8 mg total) by mouth every 12 (twelve) hours as needed for Nausea.    pantoprazole (PROTONIX) 40 MG tablet Take 1 tablet (40 mg total) by mouth once daily.    polyethylene glycol (GLYCOLAX) 17 gram PwPk Take 17 g by mouth daily as needed (Constipation.).    potassium chloride SA (K-DUR,KLOR-CON) 20 MEQ tablet Take 1 tablet (20 mEq total) by mouth 2 (two) times daily. (Patient taking differently: Take 20 mEq by mouth once daily. Patient states only taking once daily)    ranitidine (ZANTAC) 150 MG capsule Take 150 mg by mouth daily as needed.     SENNOSIDES (SENNA LAX ORAL) Take by mouth as needed.    tolterodine (DETROL LA) 4 MG 24 hr capsule     docusate sodium (COLACE) 100 MG capsule Take 1 capsule (100 mg total) by mouth 3 (three) times daily as needed  for Constipation.    fluoxetine (PROZAC) 20 MG capsule Take 2 capsules (40 mg total) by mouth once daily.    hydrocodone-acetaminophen 10-325mg (NORCO)  mg Tab Take 1 tablet by mouth every 6 (six) hours as needed.    [DISCONTINUED] lamotrigine (LAMICTAL) 25 MG tablet Take 1 tablet (25 mg total) by mouth once daily.     Family History     Problem Relation (Age of Onset)    Cancer Mother (55), Father    Heart disease Maternal Uncle    No Known Problems Brother, Brother, Sister, Maternal Aunt, Paternal Aunt, Paternal Uncle, Maternal Grandfather, Paternal Grandmother, Paternal Grandfather    Parkinsonism Maternal Grandmother    Tremor Maternal Grandmother        Social History Main Topics    Smoking status: Never Smoker    Smokeless tobacco: Never Used    Alcohol use No      Comment: denies    Drug use: No    Sexual activity: No     Review of Systems   Constitutional: Positive for unexpected weight change. Negative for chills and fever.   HENT: Negative.    Eyes: Negative.    Respiratory: Negative.    Cardiovascular: Positive for leg swelling. Negative for chest pain and palpitations.   Gastrointestinal: Negative.    Endocrine: Negative.    Genitourinary: Positive for hematuria. Negative for flank pain.   Musculoskeletal: Positive for back pain. Negative for joint swelling.   Skin: Negative.    Allergic/Immunologic: Negative.    Neurological: Positive for syncope and weakness. Negative for seizures and facial asymmetry.   Psychiatric/Behavioral: Negative.      Objective:     Vital Signs (Most Recent):  Temp: 97.9 °F (36.6 °C) (02/01/17 2215)  Pulse: (!) 58 (02/01/17 2215)  Resp: 16 (02/01/17 2215)  BP: (!) 107/57 (02/01/17 2215)  SpO2: 97 % (02/01/17 2215) Vital Signs (24h Range):  Temp:  [97.4 °F (36.3 °C)-97.9 °F (36.6 °C)] 97.9 °F (36.6 °C)  Pulse:  [50-96] 58  Resp:  [15-18] 16  SpO2:  [97 %-100 %] 97 %  BP: (107-133)/(53-75) 107/57        There is no height or weight on file to calculate  BMI.    Physical Exam   Constitutional: She is oriented to person, place, and time. She appears well-developed and well-nourished. She appears lethargic. No distress.   HENT:   Head: Normocephalic and atraumatic.   Right Ear: External ear normal.   Left Ear: External ear normal.   Nose: Nose normal.   Mouth/Throat: Oropharynx is clear and moist.   Eyes: Conjunctivae and EOM are normal. Pupils are equal, round, and reactive to light.   Neck: Normal range of motion. Neck supple.   Cardiovascular: Normal rate, regular rhythm and intact distal pulses.    Murmur heard.  Pulmonary/Chest: Effort normal and breath sounds normal. No respiratory distress. She has no wheezes. She has no rales. She exhibits no tenderness.   Abdominal: Soft. Bowel sounds are normal. She exhibits no distension. There is no tenderness.       Musculoskeletal:        Right lower leg: She exhibits edema.        Left lower leg: She exhibits edema.   Neurological: She is oriented to person, place, and time. She appears lethargic. GCS eye subscore is 3. GCS verbal subscore is 5. GCS motor subscore is 6.   4/5 strength BUE  3/5 strength BLE   Skin: She is not diaphoretic.   BLE wrapped for lymphedema   Psychiatric: Her speech is slurred.   Nursing note and vitals reviewed.       Significant Labs: All pertinent labs within the past 24 hours have been reviewed.    Significant Imaging: I have reviewed all pertinent imaging results/findings within the past 24 hours.

## 2017-02-02 NOTE — ED NOTES
Pt and family updated on the current status. Side rails x 2. Call light in reach. Attached to the cardiac monitor. Lisa [phone], pt's daughter.

## 2017-02-02 NOTE — CONSULTS
"[facility]  Endocrinology  Consult Note    Consult Requested by: Barrera Coppola MD   Reason for admit: Acute cystitis with hematuria    HISTORY OF PRESENT ILLNESS:  Reason for Consult: "? subclinical hypothyroidism; TSH 22, free T4 1"    HPI:   Patient is a 61 y.o. female with a diagnosis of CAD, CHF, chronic back pain and neurogenic bladder presented to hospital complaining of fall and decreased level of consciousness also noted history of  Polypharmacy. Currently admitted for Acute cystitis with hematuria and Lethargy thought to be related to polypharmacy.     Endocrinology consulted for abnormal thyroid function test   Hypothyroidism dx in 1975, no hx of thyroid surgery or radiation   At home on LT4 125 mcg   Takes it sometimes in the morning or noon and with other medications   Also reports missing her med  And does always wait 30 mins before having her meal    Sx: dry skin, + constipation ( chronic, also on opioids)   + cold intolerance.   No hypothermia or bradycardia   Sodium in normal range           Medications and/or Treatments Impacting Glycemic Control:  Immunotherapy:    Immunosuppressants     None        Steroids:   Hormones     None        Pressors:    Autonomic Drugs     None          Prescriptions Prior to Admission   Medication Sig Dispense Refill Last Dose    alprazolam (XANAX) 0.25 MG tablet Take 1 tablet (0.25 mg total) by mouth once daily. 14 tablet 0 1/31/2017    aspirin (ECOTRIN) 81 MG EC tablet Take 1 tablet (81 mg total) by mouth once daily.  0 1/31/2017    atorvastatin (LIPITOR) 80 MG tablet Take 1 tablet (80 mg total) by mouth once daily. (Patient taking differently: Take 80 mg by mouth every evening. ) 90 tablet 3 1/31/2017    buPROPion (WELLBUTRIN XL) 150 MG TB24 tablet Take 2 tablets (300 mg total) by mouth once daily. 60 tablet 2 1/31/2017    BUTALBITAL/ASPIRIN/CAFFEINE (FIORINAL ORAL) Take by mouth as needed.   1/31/2017    clopidogrel (PLAVIX) 75 mg tablet " Take 1 tablet (75 mg total) by mouth once daily. 30 tablet 6 1/31/2017    furosemide (LASIX) 40 MG tablet Take 1 tablet (40 mg total) by mouth 2 (two) times daily. (Patient taking differently: Take 60 mg by mouth once daily. Patient states she is taking 1.5 daily) 180 tablet 3 1/31/2017    hyoscyamine (ANASPAZ,LEVSIN) 0.125 mg Tab Take 1 tablet (125 mcg total) by mouth every 6 (six) hours as needed. 120 tablet 1 1/31/2017    levothyroxine (SYNTHROID) 125 MCG tablet Take 1 tablet (125 mcg total) by mouth once daily. 90 tablet 3 1/31/2017    lisinopril (PRINIVIL,ZESTRIL) 2.5 MG tablet Take 1 tablet (2.5 mg total) by mouth once daily. 90 tablet 3 1/31/2017    ondansetron (ZOFRAN) 8 MG tablet Take 1 tablet (8 mg total) by mouth every 12 (twelve) hours as needed for Nausea. 60 tablet 3 1/31/2017    pantoprazole (PROTONIX) 40 MG tablet Take 1 tablet (40 mg total) by mouth once daily. 90 tablet 3 1/31/2017    polyethylene glycol (GLYCOLAX) 17 gram PwPk Take 17 g by mouth daily as needed (Constipation.). 30 packet 5 1/31/2017    potassium chloride SA (K-DUR,KLOR-CON) 20 MEQ tablet Take 1 tablet (20 mEq total) by mouth 2 (two) times daily. (Patient taking differently: Take 20 mEq by mouth once daily. Patient states only taking once daily) 180 tablet 3 1/31/2017    ranitidine (ZANTAC) 150 MG capsule Take 150 mg by mouth daily as needed.    1/31/2017    SENNOSIDES (SENNA LAX ORAL) Take by mouth as needed.   1/31/2017    tolterodine (DETROL LA) 4 MG 24 hr capsule    1/31/2017    docusate sodium (COLACE) 100 MG capsule Take 1 capsule (100 mg total) by mouth 3 (three) times daily as needed for Constipation. 90 capsule 3 1/26/2017 at 0730    fluoxetine (PROZAC) 20 MG capsule Take 2 capsules (40 mg total) by mouth once daily. 60 capsule 2 1/26/2017 at 0730    hydrocodone-acetaminophen 10-325mg (NORCO)  mg Tab Take 1 tablet by mouth every 6 (six) hours as needed. 61 tablet 0 1/26/2017 at 0600       Current  Facility-Administered Medications   Medication Dose Route Frequency Provider Last Rate Last Dose    acetaminophen tablet 650 mg  650 mg Oral Q6H PRN Jaye Toledo NP   650 mg at 02/02/17 0631    aspirin EC tablet 81 mg  81 mg Oral Daily Jaye Toledo NP   81 mg at 02/02/17 0859    atorvastatin tablet 80 mg  80 mg Oral QHS Jaye Toledo NP   80 mg at 02/01/17 2315    buPROPion 24 hr tablet 300 mg  300 mg Oral Daily Jaye Toledo NP   300 mg at 02/02/17 0859    clopidogrel tablet 75 mg  75 mg Oral Daily Jaye Toledo NP   75 mg at 02/02/17 0858    enoxaparin injection 40 mg  40 mg Subcutaneous Daily Jaye Toledo NP        fluoxetine capsule 40 mg  40 mg Oral Daily Jaye Toledo NP   40 mg at 02/02/17 0900    furosemide tablet 60 mg  60 mg Oral Daily Jaye Toledo NP   60 mg at 02/02/17 0858    hyoscyamine 0.125 mg/mL solution 0.125 mg  0.125 mg Oral Q6H PRN Jaye Toledo NP        ibuprofen (CALDOLOR) 800mg/250mL D5W (READY TO MIX SYSTEM)  800 mg Intravenous Once Jaye Toledo NP   800 mg at 02/02/17 0251    ibuprofen tablet 400 mg  400 mg Oral Q6H PRN Jaye Toledo NP        lactulose 20 gram/30 mL solution Soln 20 g  20 g Oral BID PRN Jaye Toledo NP        levothyroxine tablet 125 mcg  125 mcg Oral Before breakfast Jaye Toledo NP   125 mcg at 02/02/17 0631    lisinopril tablet 2.5 mg  2.5 mg Oral Daily Jaye Toledo NP   2.5 mg at 02/02/17 0858    naloxone 0.4 mg/mL injection 0.4 mg  0.4 mg Intravenous PRN Jaye Toledo NP        ondansetron disintegrating tablet 8 mg  8 mg Oral Q8H PRN Jaye Toledo NP        ondansetron injection 4 mg  4 mg Intravenous Q8H PRN Jaye Toledo NP        oxybutynin 24 hr tablet 10 mg  10 mg Oral Daily Jaye Toledo NP   10 mg at 02/02/17 0900    pantoprazole EC tablet 40 mg  40 mg Oral Daily Jaye Toledo NP   40 mg at 02/02/17 0900    polyethylene glycol packet 17 g  17 g Oral BID Jaye Toledo NP    17 g at 02/01/17 2315    potassium chloride SA CR tablet 20 mEq  20 mEq Oral Daily Jaye Toledo NP   20 mEq at 02/02/17 0859    senna-docusate 8.6-50 mg per tablet 1 tablet  1 tablet Oral BID Jaye Toledo NP   1 tablet at 02/02/17 0859           PMH, PSH, FH, SH updated and reviewed     REVIEW OF SYSTEMS:     Constitutional: positive for weight changes.  Eyes: Negative for visual disturbance.  Respiratory: Negative for cough.   Cardiovascular: Negative for chest pain.  Gastrointestinal: positive for nausea  Endocrine: denies polyuria, polydipsia, nocturia.  Musculoskeletal: Negative for back pain.  Skin: Negative for rash.  Neurological: Negative for syncope.  Psychiatric/Behavioral: Negative for depression, anxiety.   All other systems reviewed and are negative.    Labs Reviewed and Include:  BASELINE Creatinine:   [unfilled]  [unfilled]  [unfilled]    Recent Labs  Lab 02/01/17  1510 02/02/17  0459   GLU 84 92  92   CALCIUM 9.1 8.5*  8.5*   ALBUMIN 3.4*  --    PROT 7.1  --     140  140   K 3.3* 3.6  3.6   CO2 36* 30*  30*   CL 99 103  103   BUN 10 11  11   CREATININE 0.7 0.7  0.7   ALKPHOS 124  --    ALT 8*  --    AST 16  --    BILITOT 0.5  --      Lab Results   Component Value Date    HGBA1C 5.4 08/25/2016       Nutritional status:   Body mass index is 28.8 kg/(m^2).  Lab Results   Component Value Date    ALBUMIN 3.4 (L) 02/01/2017    ALBUMIN 3.3 (L) 12/07/2016    ALBUMIN 3.4 (L) 10/25/2016     No results found for: PREALBUMIN    Estimated Creatinine Clearance: 85.4 mL/min (based on Cr of 0.7).    POCT Glucose results:    Current Insulin Regimen:    PHYSICAL EXAMINATION:  Vitals:    02/02/17 0900   BP:    Pulse: 74   Resp:    Temp:      Body mass index is 28.8 kg/(m^2).    Constitutional:   ENMT: External ears, nose with no mass or significant findings, [place],on the left and deaf on the right.   Neck:  No tracheal deviation present, No neck masses noted.  No thyromegaly or thyroid  tenderness.  Cardiovascular:  No murmurs or abnormal sounds, no lower extremity edema bilaterally  Respiratory:  Normal effort, no use of accessory muscles, Normal breath sounds without adventitious sounds  Abdomen:  Soft, no masses, no tenderness, Bowel sounds are normal. No hernia noted  Skin:  No rashes, lesions or ulcers, no induration or nodules  Psychiatric:  Judgement and insight good with normal mood and affect  Musculoskeletal: Unable to assess gait, digits and nails with no clubbing, cyanosis or petechiae.  Neurological: Cranial nerves are grossly intact. Reflex normal     .     ASSESSMENT and PLAN:    Primary hypothyroidism  Hx of non-compliance with medications.   Weight based dose should be around: 121, however in elderly with CAD should be less --> ideally should be around 88 mcg   Suspect high dose required due to absorption issues, pt taking LT4 with other medications and food.   Need to monitor for iatrogenic hyperthyroidism if pt is compliant with  levothyroxine 125 mcg   Recommend decreasing to 100 mcg and to check as outpatient.    Will sign off at this time, please call with any questions.       * Acute cystitis with hematuria  likely contributing to the lethargy along with the opioids     Coronary artery disease involving native coronary artery of native heart without angina pectoris  Avoid iatrogenic hyperthyroidism       Neurogenic bladder  Per primary team        Plan discussed with patient and family at bedside.     DISCHARGE NEEDS: will assess daily    Ai Samayoa DO  Endocrinology  [facility]

## 2017-02-02 NOTE — ASSESSMENT & PLAN NOTE
- last 2D echo & cath 1/2016 (see associated reports)  - check 2D echo in setting of recent falls and likely new onset murmur  - continue home lisinopril

## 2017-02-02 NOTE — CONSULTS
[facility]  Adult Nutrition  Consult Note    SUMMARY     Recommendations    1. Continue current cardiac diet.   2. Add Boost Plus BID.   3. RD to monitor & follow-up.    Goals: PO intake >50%  Nutrition Goal Status: new  Communication of RD Recs: reviewed with RN    Reason for Assessment    Reason for Assessment: nurse/nurse practitioner consult  Diagnosis: other (see comments) (Cystitis)  Relevent Medical History: -   Interdisciplinary Rounds: did not attend     General Information Comments: Pt disoriented during visit, consuming 75% of meals. Wt loss noted, but unsure if accurate 2/2 pt's AMS.   Discharge planning: adequate PO intake    Nutrition Prescription Ordered    Current Diet Order: Cardiac     Nutrition Risk Screen     Nutrition Risk Screen: no indicators present    Nutrition/Diet History    Patient Reported Diet/Restrictions/Preferences: general     Factors Affecting Nutritional Intake: other (see comments) (None)    Labs/Tests/Procedures/Meds    Pertinent Labs Reviewed: reviewed, pertinent  Pertinent Medications Reviewed: reviewed, pertinent  Pertinent Medications Comments: Statin    Physical Findings    Overall Physical Appearance: other (see comments) (Nourished)  Oral/Mouth Cavity: WDL  Skin: intact    Anthropometrics    Height (inches): 64.02 in  Weight Method: Bed Scale  Weight (kg): 76.1 kg     Ideal Body Weight (IBW), Female: 120.1 lb  % Ideal Body Weight, Female (lb): 139.69 lb     BMI (kg/m2): 28.78  BMI Grade: 25 - 29.9 - overweight  Usual Body Weight (UBW), k kg  % Usual Body Weight: 80.96     Weight Loss: unintentional    Estimated/Assessed Needs    Weight Used For Calorie Calculations: 76.1 kg (167 lb 12.3 oz)   Height (cm): 162.6 cm     Energy Need Method: [place], other (see comments) (3575-9665 kcal/d)     Weight Used For Protein Calculations: 76.1 kg (167 lb 12.3 oz)  Protein Requirements: 76-84 g/d    Fluid Need Method: RDA Method, other (see comments)  (Per MD or 1 mL/kcal)    Monitor and Evaluation    Food and Nutrient Intake: energy intake, food and beverage intake  Food and Nutrient Adminstration: diet order     Physical Activity and Function: nutrition-related ADLs and IADLs  Anthropometric Measurements: weight, weight change, body mass index  Biochemical Data, Medical Tests and Procedures: electrolyte and renal panel, gastrointestinal profile, glucose/endocrine profile, inflammatory profile, lipid profile  Nutrition-Focused Physical Findings: overall appearance    Nutrition Risk    Level of Risk: low    Nutrition Follow-Up    RD Follow-up?: Yes

## 2017-02-02 NOTE — SUBJECTIVE & OBJECTIVE
PMH, PSH, FH, SH updated and reviewed     REVIEW OF SYSTEMS:   Difficult to obtain, pt falling asleep during exam  Constitutional: positive for weight changes.  Eyes: Negative for visual disturbance.  Respiratory: Negative for cough.   Cardiovascular: Negative for chest pain.  Gastrointestinal: positive for nausea  Endocrine: denies polyuria, polydipsia, nocturia.  Musculoskeletal: Negative for back pain.  Skin: Negative for rash.  Neurological: Negative for syncope.  Psychiatric/Behavioral: Negative for depression, anxiety.   All other systems reviewed and are negative.    Labs Reviewed and Include:  BASELINE Creatinine:   [unfilled]  [unfilled]  [unfilled]    Recent Labs  Lab 02/01/17  1510 02/02/17  0459   GLU 84 92  92   CALCIUM 9.1 8.5*  8.5*   ALBUMIN 3.4*  --    PROT 7.1  --     140  140   K 3.3* 3.6  3.6   CO2 36* 30*  30*   CL 99 103  103   BUN 10 11  11   CREATININE 0.7 0.7  0.7   ALKPHOS 124  --    ALT 8*  --    AST 16  --    BILITOT 0.5  --      Lab Results   Component Value Date    HGBA1C 5.4 08/25/2016       Nutritional status:   Body mass index is 28.8 kg/(m^2).  Lab Results   Component Value Date    ALBUMIN 3.4 (L) 02/01/2017    ALBUMIN 3.3 (L) 12/07/2016    ALBUMIN 3.4 (L) 10/25/2016     No results found for: PREALBUMIN    Estimated Creatinine Clearance: 85.4 mL/min (based on Cr of 0.7).    POCT Glucose results:    Current Insulin Regimen:    PHYSICAL EXAMINATION:  Vitals:    02/02/17 0900   BP:    Pulse: 74   Resp:    Temp:      Body mass index is 28.8 kg/(m^2).    Constitutional:   ENMT: External ears, nose with no mass or significant findings, Hearing intact  Neck:  No tracheal deviation present, No neck masses noted.  No thyromegaly or thyroid tenderness.  Cardiovascular:  No murmurs or abnormal sounds, no lower extremity edema bilaterally  Respiratory:  Normal effort, no use of accessory muscles, Normal breath sounds without adventitious sounds  Abdomen:  Soft, no masses, no  tenderness, Bowel sounds are normal. No hernia noted  Skin:  No rashes, lesions or ulcers, no induration or nodules  Psychiatric:  Judgement and insight good with normal mood and affect  Musculoskeletal: Unable to assess gait, digits and nails with no clubbing, cyanosis or petechiae.  Neurological: Cranial nerves are grossly intact.

## 2017-02-02 NOTE — ASSESSMENT & PLAN NOTE
- TSH 22, free T4 1  - continue home synthroid  - endocrine consulted: reduce LT4 to 100 mcg per endo reccs, patient taking medication irregularly and contributing to lab findings. Education provided to patient. Follow up outpatient.

## 2017-02-02 NOTE — PT/OT/SLP EVAL
Occupational Therapy  Evaluation    Tasha Hawley   MRN: [id number]   Admitting Diagnosis: Acute cystitis with hematuria    OT Date of Treatment: 02/02/17   OT Start Time: 1039  OT Stop Time: 1116  OT Total Time (min): 37 min    Billable Minutes:  Evaluation 22 minutes  Self Care/Home Management 15 minutes    Diagnosis: Acute cystitis with hematuria   Decreased strength, endurance, balance, ROM, safety    Past Medical History   Diagnosis Date    Allergy     Anxiety     Arthritis     Carotid artery occlusion     Cataract     Depression     Edema      chronic    Fever blister     Hypothyroid     Iron deficiency anemia     Joint pain     Lumbar radiculopathy      with chronic pain    Ocular migraine     Sleep apnea      cpap      Past Surgical History   Procedure Laterality Date    Hysterectomy  1975     endometriosis    Back surgery       x 12    Pain pump placement      Spine surgery  5-13-13     CERVICAL FUSION    Cataract extraction w/  intraocular lens implant Left      Dr Coleman        Referring physician: LAURI Coppola  Date referred to OT: 2/2/2017    General Precautions: Standard, fall    Do you have any cultural, spiritual, Religion conflicts, given your current situation?: none     Patient History:  Living Environment  Lives With: spouse  Living Arrangements: house  Home Accessibility:  (no concerns)  Home Layout: Able to live on 1st floor  Stair Railings at Home: none  Transportation Available: family or friend will provide  Living Environment Comment: Pt lives with  in a 1SH with threshhold to enter. She has a tub/shower with TTB and comfort height toilet. Pt uses a Rollator for short distance ambulation and also uses a w/c both in the house and in community. Pt requires A with dressing, bathing, and PRN bed mobility and toileting.  Equipment Currently Used at Home: bath bench, bedside commode, wheelchair, rollator    Prior level of function:   Bed Mobility/Transfers: needs  "device  Grooming: independent  Bathing: needs device and assist  Upper Body Dressing: needs assist  Lower Body Dressing: needs assist  Toileting: needs device  Home Management Skills: needs assist  Homemaking Responsibilities: Yes  Mode of Transportation: Family     Dominant hand: right    Subjective:  Communicated with RN prior to session.  "I can't stand up straight, I've had too many back surgeries and this is what I've been left with."  Chief Complaint: back and leg pain  Patient/Family stated goals: get better and go home    Pain Rating: 10/10  Location - Orientation: generalized  Location:  (back and legs)  Pain Addressed: Reposition, Distraction  Pain Rating Post-Intervention: 10/10    Objective:  Patient found with: bed alarm, peripheral IV, meadows catheter, pt found supine in bed and agreeable to coeval with OT/PT    Cognitive Exam:  Oriented to: Person, Place, Time and Situation  Follows Commands/attention: Follows multistep  commands  Communication: clear/fluent  Memory:  No Deficits noted  Safety awareness/insight to disability: impaired  Coping skills/emotional control: Appropriate to situation    Visual/perceptual:  Intact    Physical Exam:  Postural examination/scapula alignment: Rounded shoulder, Head forward and Abnormal trunk flexion  Skin integrity: dry, wounds to LEs, but covered by bandages  Edema: Moderate LEs    Sensation:   Intact UEs    Upper Extremity Range of Motion:  Right Upper Extremity: WFL  Left Upper Extremity: WFL    Upper Extremity Strength:  Right Upper Extremity: 3/5  Left Upper Extremity: 3/5   Strength: Good    Fine motor coordination:   Intact    Gross motor coordination: impaired dynamic standing    Functional Mobility:  Bed Mobility:  Supine to Sit: Contact Guard Assistance  Sit to Supine: Contact Guard Assistance    Transfers:  Sit <> Stand Assistance: Contact Guard Assistance  Sit <> Stand Assistive Device: Rolling Walker  Toilet Transfer Technique: Stand Pivot  Toilet " Transfer Assistance: Contact Guard Assistance  Toilet Transfer Assistive Device: Rolling Walker    Functional Ambulation: CGA with RW (occasional Min A /2 poor posture and RW use)    Activities of Daily Living:    UE Dressing Level of Assistance: Set-up Assistance (gown as robe)  LE Dressing Level of Assistance: Stand by assistance (donned R sock; would likely need more assistance for underwear and pants)  Grooming Position: Standing at sink  Grooming Level of Assistance: Contact guard assistance  Toileting Where Assessed: Toilet  Toileting Level of Assistance: Contact guard    Balance:   Static Sit: NORMAL: No deviations seen in posture held statically  Dynamic Sit: GOOD+: Maintains balance through MAXIMAL excursions of active trunk motion  Static Stand: GOOD: Takes MODERATE challenges from all directions  Dynamic stand: FAIR: Needs CONTACT GUARD during gait    Therapeutic Activities and Exercises:  Pt ed re OT role and POC. Pt performed supine to sit and scoot to EOB with CGA. Pt performed strength and ROM testing. Pt donned gown as robe with Setup A. Pt donned R sock with Setup SBA. Pt performed sit to stand t/f with CGA and RW, and ambulated to bathroom with CGA and RW, sat on toilet with CGA. Pt able to perform toileting task with toilet paper setup, required CGA to stand from toilet and maintain balance while wiping in stance, and CGA to turn to sink and wash hands. Pt returned to room and to EOB with CGA and RW. Pt returned to supine with SBA/CGA.     AM-PAC 6 CLICK ADL  How much help from another person does this patient currently need?  1 = Unable, Total/Dependent Assistance  2 = A lot, Maximum/Moderate Assistance  3 = A little, Minimum/Contact Guard/Supervision  4 = None, Modified [place]/Independent    Putting on and taking off regular lower body clothing? : 2  Bathing (including washing, rinsing, drying)?: 2  Toileting, which includes using toilet, bedpan, or urinal? : 3  Putting on and taking off  "regular upper body clothing?: 3  Taking care of personal grooming such as brushing teeth?: 3  Eating meals?: 4  Total Score: 17    AM-PAC Raw Score CMS "G-Code Modifier Level of Impairment Assistance   6 % Total / Unable   7 - 9 CM 80 - 100% Maximal Assist   10 - 14 CL 60 - 80% Moderate Assist   15 - 19 CK 40 - 60% Moderate Assist   20 - 22 CJ 20 - 40% Minimal Assist   23 CI 1-20% SBA / CGA   24 CH 0% Independent/ Mod I       Patient left HOB elevated with all lines intact and call button in reach    Assessment:  Tasha Hawley is a 61 y.o. female with a medical diagnosis of Acute cystitis with hematuria and presents with the impairments listed below. Pt c/o severe pain that limits self care and functional mobility. PTA, pt required A with ADLs at home. Pt demo decreased safety awareness with RW use. Pt would benefit from cont OT to maximize independence in ADLs and improve safety, strength, endurance, and balance for fxnl mobility.    Rehab identified problem list/impairments: Rehab identified problem list/impairments: weakness, impaired endurance, impaired self care skills, impaired functional mobilty, gait instability, impaired balance, decreased coordination, decreased upper extremity function, decreased lower extremity function, decreased safety awareness, pain, decreased ROM, impaired skin, edema    Rehab potential is good.    Activity tolerance: Fair    Discharge recommendations: Discharge Facility/Level Of Care Needs: nursing facility, skilled     Barriers to discharge: Barriers to Discharge: Decreased caregiver support (pt unclear re 's state of health)    Equipment recommendations: none     GOALS:   Occupational Therapy Goals        Problem: Occupational Therapy Goal    Goal Priority Disciplines Outcome Interventions   Occupational Therapy Goal     OT, PT/OT Ongoing (interventions implemented as appropriate)    Description:  Goals to be met by: 2/12/17     Patient will increase functional " independence with ADLs by performing:    LE Dressing with Supervision and Assistive Devices as needed.  Grooming while standing at sink with Supervision.  Toileting from toilet with Modified [place] for hygiene and clothing management.   Supine to sit with Modified [place].  Stand pivot transfers with Supervision.  Toilet transfer to toilet with Supervision.                PLAN:  Patient to be seen 5 x/week to address the above listed problems via self-care/home management, therapeutic activities, therapeutic exercises  Plan of Care expires: 03/03/17  Plan of Care reviewed with: patient    JEREMY Guerrero  2/2/2017  Pager: [phone]

## 2017-02-02 NOTE — ASSESSMENT & PLAN NOTE
- suspect 2/2 polypharmacy. Multiple PCP & psych visits document lethargy & frequent falls in HPI dating back to November with suspected polypharmacy as etiology (pt on xanax & opioids); xanax recently decreased from BID to daily   - pt required dose of narcan per EMS with positive response  - will hold home norco, restart xanax  - pt has SQ dilaudid infusion pump > pain management consulted, down-titration will have to be conducted by chronic pain management outpatient  - narcan prn  - possible etiology of subclinical hypothyroidism (TSH 22, Free T4 1) > endocrine consulted, likely 2/2 inconsistent regimen, decrease to 100 mcg and monitor outpatient  - head CT neg for acute intracranial abnormality > unable to obtain MRI 2/2 infusion pump

## 2017-02-02 NOTE — PLAN OF CARE
Problem: Patient Care Overview  Goal: Plan of Care Review  Outcome: Ongoing (interventions implemented as appropriate)  1. Continue current cardiac diet.   2. Add Boost Plus BID.   3. RD to monitor & follow-up.

## 2017-02-02 NOTE — ASSESSMENT & PLAN NOTE
- suspect 2/2 polypharmacy as above  - plan as above  - endocrine consult as above  - PT/OT eval: recommending SNF, CM/SW to discuss with patient, patient already with HH

## 2017-02-02 NOTE — H&P
"[facility] Hospital Medicine  History & Physical    Patient Name: Tasha Hawley  MRN: [id number]  Admission Date: 2/1/2017  Attending Physician: Barrera Coppola MD   Primary Care Provider: Mesfin Hodges Ii, MD    [facility] Medicine Team: Networked reference to record PCT  Jaye Toledo NP     Patient information was obtained from patient, relative(s), past medical records and ER records.     Subjective:     Principal Problem:Acute cystitis with hematuria    Chief Complaint:  "pain"     HPI: Patient is a 61 y.o. female with significant past medical history of CAD, CHF, chronic back pain and neurogenic bladder presented to hospital complaining of fall and decreased level of consciousness. Pt was found down by a family member "unconsciousness," unknown down time. Per chart review, both PCP and psych have documented lethargy, frequent falls & polypharmacy dating back to November. Pt has SQ dilaudid pump (last adjusted ~ talia), as well as norco for break through pain and daily xanax (decreased recently from BID).   Pt incidentally also recently had a suprapubic catheter exchange (1/23) followed by cystoscopy and botox injection (1/26) for urethral incontinence. Pt reports that she has had hematuria since procedure was done.   The patient currently complains of chronic lower back pain radiating to bilateral lower extremities and constipation (LBM last week, although states it has been "weeks" since her last good BM). The patient also endorses an unintentional 60lb weight loss over the last month.     Work up in the ED revealed a normocytic anemia (11/35), grossly normal chemistry with exception of mild hypokalemia (3.3), Ck and troponin were WNL. TSH was elevated at 22, however Free T4 is WNL. ETOH was neg. Utox was positive for bzo, opiates and barbituates. UA was grossly positive. CT was neg for acute intracranial abnormality. CXRY was neg for acute cardiopulmonary dz. Pelvic xray was neg " for acute fracture. The patient was admitted to \[facility]\"" for further management.       Past Medical History   Diagnosis Date    Allergy     Anxiety     Arthritis     Carotid artery occlusion     Cataract     Depression     Edema      chronic    Fever blister     Hypothyroid     Iron deficiency anemia     Joint pain     Lumbar radiculopathy      with chronic pain    Ocular migraine     Sleep apnea      cpap       Past Surgical History   Procedure Laterality Date    Hysterectomy  1975     endometriosis    Back surgery       x 12    Pain pump placement      Spine surgery  5-13-13     CERVICAL FUSION    Cataract extraction w/  intraocular lens implant Left      Dr Coleman        Review of patient's allergies indicates:   Allergen Reactions    Imitrex [sumatriptan succinate] Shortness Of Breath    Penicillins Shortness Of Breath     Other reaction(s): Jittery    Percocet [oxycodone-acetaminophen] Anaphylaxis    Topamax [topiramate] Shortness Of Breath    Vancomycin Shortness Of Breath     Rash    (d)-limonene flavor      Other reaction(s): difficult intubation  Other reaction(s): Jittery  Other reaction(s): Difficulty breathing  Other reaction(s): Difficulty breathing  Other reaction(s): Jittery  Other reaction(s): Difficulty breathing    Bactrim [sulfamethoxazole-trimethoprim] Nausea And Vomiting     Other reaction(s): Resp Depression  Other reaction(s): Anaphylaxis    Butorphanol tartrate     Darvocet a500 [propoxyphene n-acetaminophen]      Other reaction(s): Jittery  Other reaction(s): Difficulty breathing    Fentanyl      Other reaction(s): Vomiting  Other reaction(s): Nausea  Other reaction(s): Itching  swelling    Phenytoin sodium extended      pATIENT DENIES EVER HAVING THIS MEDICATION    White petrolatum-zinc     Zinc oxide-white petrolatum      Other reaction(s): Difficulty breathing    Latex, natural rubber Itching and Rash       No current facility-administered medications on  file prior to encounter.      Current Outpatient Prescriptions on File Prior to Encounter   Medication Sig    alprazolam (XANAX) 0.25 MG tablet Take 1 tablet (0.25 mg total) by mouth once daily.    aspirin (ECOTRIN) 81 MG EC tablet Take 1 tablet (81 mg total) by mouth once daily.    atorvastatin (LIPITOR) 80 MG tablet Take 1 tablet (80 mg total) by mouth once daily. (Patient taking differently: Take 80 mg by mouth every evening. )    buPROPion (WELLBUTRIN XL) 150 MG TB24 tablet Take 2 tablets (300 mg total) by mouth once daily.    BUTALBITAL/ASPIRIN/CAFFEINE (FIORINAL ORAL) Take by mouth as needed.    clopidogrel (PLAVIX) 75 mg tablet Take 1 tablet (75 mg total) by mouth once daily.    furosemide (LASIX) 40 MG tablet Take 1 tablet (40 mg total) by mouth 2 (two) times daily. (Patient taking differently: Take 60 mg by mouth once daily. Patient states she is taking 1.5 daily)    hyoscyamine (ANASPAZ,LEVSIN) 0.125 mg Tab Take 1 tablet (125 mcg total) by mouth every 6 (six) hours as needed.    levothyroxine (SYNTHROID) 125 MCG tablet Take 1 tablet (125 mcg total) by mouth once daily.    lisinopril (PRINIVIL,ZESTRIL) 2.5 MG tablet Take 1 tablet (2.5 mg total) by mouth once daily.    ondansetron (ZOFRAN) 8 MG tablet Take 1 tablet (8 mg total) by mouth every 12 (twelve) hours as needed for Nausea.    pantoprazole (PROTONIX) 40 MG tablet Take 1 tablet (40 mg total) by mouth once daily.    polyethylene glycol (GLYCOLAX) 17 gram PwPk Take 17 g by mouth daily as needed (Constipation.).    potassium chloride SA (K-DUR,KLOR-CON) 20 MEQ tablet Take 1 tablet (20 mEq total) by mouth 2 (two) times daily. (Patient taking differently: Take 20 mEq by mouth once daily. Patient states only taking once daily)    ranitidine (ZANTAC) 150 MG capsule Take 150 mg by mouth daily as needed.     SENNOSIDES (SENNA LAX ORAL) Take by mouth as needed.    tolterodine (DETROL LA) 4 MG 24 hr capsule     docusate sodium (COLACE) 100 MG  capsule Take 1 capsule (100 mg total) by mouth 3 (three) times daily as needed for Constipation.    fluoxetine (PROZAC) 20 MG capsule Take 2 capsules (40 mg total) by mouth once daily.    hydrocodone-acetaminophen 10-325mg (NORCO)  mg Tab Take 1 tablet by mouth every 6 (six) hours as needed.    [DISCONTINUED] lamotrigine (LAMICTAL) 25 MG tablet Take 1 tablet (25 mg total) by mouth once daily.     Family History     Problem Relation (Age of Onset)    Cancer Mother (55), Father    Heart disease Maternal Uncle    No Known Problems Brother, Brother, Sister, Maternal Aunt, Paternal Aunt, Paternal Uncle, Maternal Grandfather, Paternal Grandmother, Paternal Grandfather    Parkinsonism Maternal Grandmother    Tremor Maternal Grandmother        Social History Main Topics    Smoking status: Never Smoker    Smokeless tobacco: Never Used    Alcohol use No      Comment: denies    Drug use: No    Sexual activity: No     Review of Systems   Constitutional: Positive for unexpected weight change. Negative for chills and fever.   HENT: Negative.    Eyes: Negative.    Respiratory: Negative.    Cardiovascular: Positive for leg swelling. Negative for chest pain and palpitations.   Gastrointestinal: Negative.    Endocrine: Negative.    Genitourinary: Positive for hematuria. Negative for flank pain.   Musculoskeletal: Positive for back pain. Negative for joint swelling.   Skin: Negative.    Allergic/Immunologic: Negative.    Neurological: Positive for syncope and weakness. Negative for seizures and facial asymmetry.   Psychiatric/Behavioral: Negative.      Objective:     Vital Signs (Most Recent):  Temp: 97.9 °F (36.6 °C) (02/01/17 2215)  Pulse: (!) 58 (02/01/17 2215)  Resp: 16 (02/01/17 2215)  BP: (!) 107/57 (02/01/17 2215)  SpO2: 97 % (02/01/17 2215) Vital Signs (24h Range):  Temp:  [97.4 °F (36.3 °C)-97.9 °F (36.6 °C)] 97.9 °F (36.6 °C)  Pulse:  [50-96] 58  Resp:  [15-18] 16  SpO2:  [97 %-100 %] 97 %  BP:  (107-133)/(53-75) 107/57        There is no height or weight on file to calculate BMI.    Physical Exam   Constitutional: She is oriented to person, place, and time. She appears well-developed and well-nourished. She appears lethargic. No distress.   HENT:   Head: Normocephalic and atraumatic.   Right Ear: External ear normal.   Left Ear: External ear normal.   Nose: Nose normal.   Mouth/Throat: Oropharynx is clear and moist.   Eyes: Conjunctivae and EOM are normal. Pupils are equal, round, and reactive to light.   Neck: Normal range of motion. Neck supple.   Cardiovascular: Normal rate, regular rhythm and intact distal pulses.    Murmur heard.  Pulmonary/Chest: Effort normal and breath sounds normal. No respiratory distress. She has no wheezes. She has no rales. She exhibits no tenderness.   Abdominal: Soft. Bowel sounds are normal. She exhibits no distension. There is no tenderness.       Musculoskeletal:        Right lower leg: She exhibits edema.        Left lower leg: She exhibits edema.   Neurological: She is oriented to person, place, and time. She appears lethargic. GCS eye subscore is 3. GCS verbal subscore is 5. GCS motor subscore is 6.   4/5 strength BUE  3/5 strength BLE   Skin: She is not diaphoretic.   BLE wrapped for lymphedema   Psychiatric: Her speech is slurred.   Nursing note and vitals reviewed.       Significant Labs: All pertinent labs within the past 24 hours have been reviewed.    Significant Imaging: I have reviewed all pertinent imaging results/findings within the past 24 hours.    Assessment/Plan:     * Acute cystitis with hematuria  - suprapubic catheter present (last changed 1/23) & s/p cystoscopy & botox injection 1/26  - UA with occasional bacteria, > 100 WBCx, 3+ leuks & + nitrites  - received x1 dose rocephin in ED  - unclear if specimen collected was from leg bag or directly from catheter > will repeat UA/Ucx on specimen directly from catheter  - continue abx if repeat UA still  concerning for infection        Lethargy  - suspect 2/2 polypharmacy. Multiple PCP & psych visits document lethargy & frequent falls in HPI dating back to November with suspected polypharmacy as etiology (pt on xanax & opioids); xanax recently decreased from BID to daily   - pt required dose of narcan per EMS with positive response  - will hold home xanax & norco  - pt has SQ dilaudid infusion pump > pain management consulted for assistance in possible down-titration  - narcan prn  - possible etiology of subclinical hypothyroidism (TSH 22, Free T4 1) > endocrine consulted, awaiting recommendations  - head CT neg for acute intracranial abnormality > unable to obtain MRI 2/2 infusion pump      Fall  - suspect 2/2 polypharmacy as above  - plan as above  - endocrine consult as above  - PT/OT eval      Combined systolic and diastolic cardiac dysfunction  - last 2D echo & cath 1/2016 (see associated reports)  - check 2D echo in setting of recent falls and likely new onset murmur  - continue home lisinopril      Subclinical iodine-deficiency hypothyroidism  - TSH 22, free T4 1  - continue home synthroid  - endocrine consult as above      Constipation  - LBM last week  - suspect 2/2 opioid use & possible subclinical hypothyroidism  - aggressive bowel regimen      Neurogenic bladder  - s/p cystoscopy & botox injection 1/26  - continue home levsin and detrol   - no gross hematuria on exam, H/H stable, will continue dual antiplatelet therapy      Narcotic dependency, continuous  - dilaudid pump SQ > pain management consult as above  - holding home norco  - narcan prn      Lumbar radiculopathy  - pain plan as above  - PT/OT      Coronary artery disease involving native coronary artery of native heart without angina pectoris  - continue home dual antiplatelet therapy, ACE and statin      Iron deficiency anemia  - H/H 11/35 > monitor      GERD (gastroesophageal reflux disease)  - continue home PPI      Chronic pain  - pain plan  as above      Anxiety  - holding home xanax in setting of lethargy      Weight loss, unintentional  - unclear etiology; possibly 2/2 increased lethargy resulting in decreased PO intake   - check pre-albumin  - dietary consult  - check tumor markers to r/o underlying ca      VTE Risk Mitigation         Ordered     enoxaparin injection 40 mg  Daily     Route:  Subcutaneous        02/02/17 0029        Jaye Toledo NP  Department of Hospital Medicine   [facility]

## 2017-02-02 NOTE — ASSESSMENT & PLAN NOTE
- initially holding home xanax in setting of lethargy  - patient states she takes mostly in AM and occasionally at night, will resume as patient is more alert

## 2017-02-02 NOTE — ASSESSMENT & PLAN NOTE
- unclear etiology; possibly 2/2 increased lethargy resulting in decreased PO intake   - check pre-albumin  - dietary consult  - check tumor markers to r/o underlying ca

## 2017-02-02 NOTE — ASSESSMENT & PLAN NOTE
- suspect 2/2 polypharmacy. Multiple PCP & psych visits document lethargy & frequent falls in HPI dating back to November with suspected polypharmacy as etiology (pt on xanax & opioids); xanax recently decreased from BID to daily   - pt required dose of narcan per EMS with positive response  - will hold home xanax & norco  - pt has SQ dilaudid infusion pump > pain management consulted for assistance in possible down-titration  - narcan prn  - possible etiology of subclinical hypothyroidism (TSH 22, Free T4 1) > endocrine consulted, awaiting recommendations  - head CT neg for acute intracranial abnormality > unable to obtain MRI 2/2 infusion pump

## 2017-02-02 NOTE — PT/OT/SLP EVAL
Physical Therapy  Evaluation/ Treatment     Tasha Hawley   MRN: [id number]   Admitting Diagnosis: Acute cystitis with hematuria    PT Received On: 02/02/17  PT Start Time: 1038     PT Stop Time: 1116    PT Total Time (min): 38 min   (co-eval with OT)    Billable Minutes:  Evaluation 15 and Gait Training 15    Diagnosis: Acute cystitis with hematuria    Past Medical History   Diagnosis Date    Allergy     Anxiety     Arthritis     Carotid artery occlusion     Cataract     Depression     Edema      chronic    Fever blister     Hypothyroid     Iron deficiency anemia     Joint pain     Lumbar radiculopathy      with chronic pain    Ocular migraine     Sleep apnea      cpap      Past Surgical History   Procedure Laterality Date    Hysterectomy  1975     endometriosis    Back surgery       x 12    Pain pump placement      Spine surgery  5-13-13     CERVICAL FUSION    Cataract extraction w/  intraocular lens implant Left      Dr Coleman        Referring physician: Jaye Toledo NP   Date referred to PT: 2/1/2017    General Precautions: Standard, fall, hard of hearing   Orthopedic Precautions: N/A   Braces: N/A       Do you have any cultural, spiritual, Oriental orthodox conflicts, given your current situation?: none stated    Patient History:  Lives With: spouse  Living Arrangements: house  Home Accessibility:  (no concerns)  Home Layout: Able to live on 1st floor  Stair Railings at Home: none  Transportation Available: family or friend will provide  Living Environment Comment: Pt reports that she lives with her  in single-level home, threshold to enter.  She has tub/shower combo, bath bench, and comfort height commode. PTA, she reports ambulating household distances with rollator and uses wheelchair in community.  She reports numerous falls at home while ambulating 2/2 loss of balance and legs giving out.   Equipment Currently Used at Home: bath bench, bedside commode, wheelchair,  "rollator    Previous Level of Function:  Ambulation Skills: needs device  Transfer Skills: needs device  ADL Skills: needs device  Work/Leisure Activity: needs device    Subjective:  Communicated with RN prior to session.  Pt agreeable to PT/OT evaluation and treatment.    "I get around at home but I will fall 3-4x getting to the bathroom.  I have to crawl and then get help back up."    Chief Complaint: generalized pain  Patient goals: to improve functional mobility     Pain Rating: 10/10   Location - Orientation: generalized  Location:  (back and legs)  Pain Addressed: Reposition, Distraction  Pain Rating Post-Intervention: 10/10    Objective:   Patient found supine, HOB elevated with: bed alarm, peripheral IV, [place] catheter     Cognitive Exam:  Oriented to: Person, Place, Time and Situation    Follows Commands/attention: Easily distracted and Follows two-step commands  Communication: clear/fluent  Safety awareness/insight to disability: impaired    Physical Exam:  Postural examination/scapula alignment: Rounded shoulder, Posterior pelvic tilt and Lateral weight shift of hips    Skin integrity: Visible skin intact and intact bandages to BLEs  Edema: Mild BLEs    Sensation:   Impaired  light/touch BLEs  (per patient report, unable to formally test 2/2 intact bandages)    Lower Extremity Range of Motion:  Right Lower Extremity: WFL  Left Lower Extremity: WFL    Lower Extremity Strength:  Right Lower Extremity: grossly 3/5 limited by back pain with movement   Left Lower Extremity: grossly 3/5 limited by back pain with movement      Fine motor coordination:  Impaired  RLE heel shin and LLE heel shin and delayed toe tapping     Gross motor coordination: impaired as seen with standing balance and ambulation    Functional Mobility:  Bed Mobility:  Scooting/Bridging: Contact Guard Assistance (seated anterior and posterior scoot at edge of bed) Pt with decreased safety awareness sitting at edge of bed with R off of bed " placing patient at risk of fall. CGA for safety and verbal cues to perform posterior scoot to improve safety and static sitting balance.   Supine to Sit: Contact Guard Assistance (with HOB elevated)  Sit to Supine: Contact Guard Assistance (with HOB flat. CGA for safety and BLE management )    Transfers:  Sit <> Stand Assistance: Contact Guard Assistance (x 1 trial from bed in lowest position)  Sit <> Stand Assistive Device: Rolling Walker (verbal/ tactile cues for safety with RW management )  Toilet Transfer Assistance: Contact Guard Assistance  Toilet Transfer Assistive Device: Rolling Walker    Gait:   Gait Distance: 15 ft with RW and min A  Assistance 1: Minimum assistance  Gait Assistive Device: Rolling walker  Gait Pattern: 3-point gait  Gait Deviation(s): decreased cj, decreased step length, decreased stride length, decreased toe-to-floor clearance, decreased weight-shifting ability, foot flat, lateral lean  (refer to gait training and therapeutic activity section)    Balance:   Static Sit: FAIR+: Able to take MINIMAL challenges from all directions  Dynamic Sit: FAIR+: Maintains balance through MINIMAL excursions of active trunk motion  Static Stand: FAIR: Maintains without assist but unable to take challenges  Dynamic stand: POOR: N/A    Therapeutic Activities and Gait training:  PT initial evaluation complete. Pt educated on role of PT, safety with mobility, POC.    Pt performed sit to stand and initial gait assessment.  Pt with decreased safety awareness and poor posture/body mechanics.  PT treatment initiated.  · Pt with initial L forearm on RW with lateral lean to left side.  Min A tactile cues and verbal cues to promote upright posture and place L hand on RW for increased safety and improved posture.  · Pt performed performed toileting with CGA for safety 2/2 excessive forward lean while sitting on commode.    · Pt ambulated bathroom to bed for second trial x 15 ft with RW and min A.  Pt required  increased time to complete task.  Pt with initial left lateral lean and L forearm on RW.  Verbal cues and min A for upright posture and L hand placement on RW.  Pt educated on safety with RW management and upright posture to reduce back pain.  Pt attempting to perform pivot turn to bed and leave RW behind.  Min verbal/tactile cues for RW management and balance.      AM-PAC 6 CLICK MOBILITY  How much help from another person does this patient currently need?   1 = Unable, Total/Dependent Assistance  2 = A lot, Maximum/Moderate Assistance  3 = A little, Minimum/Contact Guard/Supervision  4 = None, Modified [place]/Independent    Turning over in bed (including adjusting bedclothes, sheets and blankets)?: 3  Sitting down on and standing up from a chair with arms (e.g., wheelchair, bedside commode, etc.): 3  Moving from lying on back to sitting on the side of the bed?: 3  Moving to and from a bed to a chair (including a wheelchair)?: 3  Need to walk in hospital room?: 3  Climbing 3-5 steps with a railing?: 2  Total Score: 17     AM-PAC Raw Score CMS G-Code Modifier Level of Impairment Assistance   6 % Total / Unable   7 - 9 CM 80 - 100% Maximal Assist   10 - 14 CL 60 - 80% Moderate Assist   15 - 19 CK 40 - 60% Moderate Assist   20 - 22 CJ 20 - 40% Minimal Assist   23 CI 1-20% SBA / CGA   24 CH 0% Independent/ Mod I     Patient left HOB elevated with all lines intact, call button in reach and bed alarm on.    Assessment:   Tasha Hawley is a 61 y.o. female with a medical diagnosis of Acute cystitis with hematuria and presents with below impairments. PT initial evaluation complete, pt requiring CGA - min A with functional mobility 2/2 decreased safety awareness, generalized pain, weakness, and decreased endurance.  Pt with poor safety awareness and body mechanics placing her at increased risk for falls with gait.  She will continue to benefit from skilled PT intervention to address below needs.  Upon  discharge, she will benefit from SS-SNF to reduce risk of falls, reduce caregiver burden, and progress with functional mobility for safe return to home.     Rehab identified problem list/impairments: Rehab identified problem list/impairments: weakness, impaired endurance, impaired sensation, impaired self care skills, impaired functional mobilty, gait instability, decreased lower extremity function, impaired balance, decreased safety awareness, pain, impaired fine motor, impaired coordination, impaired skin    Rehab potential is good.    Activity tolerance: Good    Discharge recommendations: Discharge Facility/Level Of Care Needs: nursing facility, skilled     Barriers to discharge: Barriers to Discharge: Decreased caregiver support    Equipment recommendations: Equipment Needed After Discharge: none (pt has needed equipment )     GOALS:   Physical Therapy Goals        Problem: Physical Therapy Goal    Goal Priority Disciplines Outcome Goal Variances Interventions   Physical Therapy Goal     PT/OT, PT Ongoing (interventions implemented as appropriate)     Description:  Goals to be met by: 2017     Patient will increase functional independence with mobility by performin. Supine to sit with Set-up [place]  2. Sit to supine with Set-up [place]  3. Sit to stand transfer with Supervision  4. Bed to chair transfer with Stand-by Assistance using Rolling Walker  5. Gait  x 50 feet with Contact Guard Assistance using Rolling Walker.   6. Stand for 8 minutes with Stand-by Assistance using Rolling Walker  7. Lower extremity exercise program x 15 reps per handout, with assistance as needed                PLAN:    Patient to be seen 5 x/week to address the above listed problems via gait training, therapeutic activities, therapeutic exercises, neuromuscular re-education  Plan of Care expires: 17  Plan of Care reviewed with: patient    Functional Assessment Tool Used: am-pac  Score: 17  Functional  Limitation: Mobility: Walking and moving around  Mobility: Walking and Moving Around Current Status (): CK  Mobility: Walking and Moving Around Goal Status (): STEVEN Delgadillo, PT, DPT  2/2/2017  Pager: [phone]

## 2017-02-02 NOTE — PLAN OF CARE
Problem: Occupational Therapy Goal  Goal: Occupational Therapy Goal  Goals to be met by: 2/12/17     Patient will increase functional independence with ADLs by performing:    LE Dressing with Supervision and Assistive Devices as needed.  Grooming while standing at sink with Supervision.  Toileting from toilet with Modified [place] for hygiene and clothing management.   Supine to sit with Modified [place].  Stand pivot transfers with Supervision.  Toilet transfer to toilet with Supervision.  Outcome: Ongoing (interventions implemented as appropriate)  OT eval completed. The above goals are established to improve functional (I) and mobility.     JEREMY Guerrero  2/2/2017  Pager: [phone]

## 2017-02-02 NOTE — PLAN OF CARE
Problem: Physical Therapy Goal  Goal: Physical Therapy Goal  Goals to be met by: 2017     Patient will increase functional independence with mobility by performin. Supine to sit with Set-up [place]  2. Sit to supine with Set-up [place]  3. Sit to stand transfer with Supervision  4. Bed to chair transfer with Stand-by Assistance using Rolling Walker  5. Gait x 50 feet with Contact Guard Assistance using Rolling Walker.   6. Stand for 8 minutes with Stand-by Assistance using Rolling Walker  7. Lower extremity exercise program x 15 reps per handout, with assistance as needed  Outcome: Ongoing (interventions implemented as appropriate)  PT initial evaluation complete. Goals established.

## 2017-02-03 VITALS
WEIGHT: 167.75 LBS | HEART RATE: 59 BPM | HEIGHT: 64 IN | TEMPERATURE: 98 F | DIASTOLIC BLOOD PRESSURE: 67 MMHG | BODY MASS INDEX: 28.64 KG/M2 | SYSTOLIC BLOOD PRESSURE: 125 MMHG | OXYGEN SATURATION: 96 % | RESPIRATION RATE: 17 BRPM

## 2017-02-03 DIAGNOSIS — N32.89 BLADDER SPASM: ICD-10-CM

## 2017-02-03 LAB
ANION GAP SERPL CALC-SCNC: 7 MMOL/L
BASOPHILS # BLD AUTO: 0 K/UL
BASOPHILS NFR BLD: 0 %
BUN SERPL-MCNC: 7 MG/DL
CALCIT SERPL-MCNC: <5 PG/ML
CALCIUM SERPL-MCNC: 9.1 MG/DL
CHLORIDE SERPL-SCNC: 103 MMOL/L
CO2 SERPL-SCNC: 29 MMOL/L
CREAT SERPL-MCNC: 0.8 MG/DL
DIFFERENTIAL METHOD: ABNORMAL
EOSINOPHIL # BLD AUTO: 0.2 K/UL
EOSINOPHIL NFR BLD: 3.8 %
ERYTHROCYTE [DISTWIDTH] IN BLOOD BY AUTOMATED COUNT: 13.9 %
EST. GFR  (AFRICAN AMERICAN): >60 ML/MIN/1.73 M^2
EST. GFR  (NON AFRICAN AMERICAN): >60 ML/MIN/1.73 M^2
GLUCOSE SERPL-MCNC: 81 MG/DL
HCT VFR BLD AUTO: 35.6 %
HGB BLD-MCNC: 11 G/DL
LYMPHOCYTES # BLD AUTO: 1.7 K/UL
LYMPHOCYTES NFR BLD: 42.9 %
MCH RBC QN AUTO: 27.6 PG
MCHC RBC AUTO-ENTMCNC: 30.9 %
MCV RBC AUTO: 89 FL
MONOCYTES # BLD AUTO: 0.4 K/UL
MONOCYTES NFR BLD: 9.9 %
NEUTROPHILS # BLD AUTO: 1.7 K/UL
NEUTROPHILS NFR BLD: 43.4 %
PLATELET # BLD AUTO: 233 K/UL
PMV BLD AUTO: 10.5 FL
POTASSIUM SERPL-SCNC: 4.2 MMOL/L
RBC # BLD AUTO: 3.99 M/UL
SODIUM SERPL-SCNC: 139 MMOL/L
WBC # BLD AUTO: 3.94 K/UL

## 2017-02-03 PROCEDURE — 63600175 PHARM REV CODE 636 W HCPCS: Performed by: NURSE PRACTITIONER

## 2017-02-03 PROCEDURE — 25000003 PHARM REV CODE 250: Performed by: PHYSICIAN ASSISTANT

## 2017-02-03 PROCEDURE — 99219 PR INITIAL OBSERVATION CARE,LEVL II: CPT | Mod: ,,, | Performed by: PODIATRIST

## 2017-02-03 PROCEDURE — 85025 COMPLETE CBC W/AUTO DIFF WBC: CPT

## 2017-02-03 PROCEDURE — 25000003 PHARM REV CODE 250: Performed by: NURSE PRACTITIONER

## 2017-02-03 PROCEDURE — 80048 BASIC METABOLIC PNL TOTAL CA: CPT

## 2017-02-03 PROCEDURE — 36415 COLL VENOUS BLD VENIPUNCTURE: CPT

## 2017-02-03 PROCEDURE — 99217 PR OBSERVATION CARE DISCHARGE: CPT | Mod: ,,, | Performed by: PHYSICIAN ASSISTANT

## 2017-02-03 PROCEDURE — G0378 HOSPITAL OBSERVATION PER HR: HCPCS

## 2017-02-03 RX ORDER — DOXYCYCLINE 100 MG/1
100 TABLET ORAL 2 TIMES DAILY
Qty: 20 TABLET | Refills: 0 | Status: SHIPPED | OUTPATIENT
Start: 2017-02-03 | End: 2017-02-13

## 2017-02-03 RX ORDER — POLYETHYLENE GLYCOL 3350 17 G/17G
17 POWDER, FOR SOLUTION ORAL 2 TIMES DAILY
Qty: 30 PACKET | Refills: 5 | Status: SHIPPED | OUTPATIENT
Start: 2017-02-03 | End: 2017-08-21

## 2017-02-03 RX ORDER — LEVOTHYROXINE SODIUM 100 UG/1
100 TABLET ORAL
Qty: 90 TABLET | Refills: 1 | Status: SHIPPED | OUTPATIENT
Start: 2017-02-03 | End: 2017-05-08 | Stop reason: SDUPTHER

## 2017-02-03 RX ORDER — HYOSCYAMINE SULFATE 0.125 MG
TABLET ORAL
Qty: 120 TABLET | Refills: 0 | Status: SHIPPED | OUTPATIENT
Start: 2017-02-03 | End: 2017-03-23

## 2017-02-03 RX ADMIN — PANTOPRAZOLE SODIUM 40 MG: 40 TABLET, DELAYED RELEASE ORAL at 09:02

## 2017-02-03 RX ADMIN — STANDARDIZED SENNA CONCENTRATE AND DOCUSATE SODIUM 1 TABLET: 8.6; 5 TABLET, FILM COATED ORAL at 09:02

## 2017-02-03 RX ADMIN — FLUOXETINE 40 MG: 20 CAPSULE ORAL at 09:02

## 2017-02-03 RX ADMIN — BUPROPION HYDROCHLORIDE 300 MG: 300 TABLET, FILM COATED, EXTENDED RELEASE ORAL at 09:02

## 2017-02-03 RX ADMIN — ASPIRIN 81 MG: 81 TABLET, COATED ORAL at 09:02

## 2017-02-03 RX ADMIN — HYDROCODONE BITARTRATE AND ACETAMINOPHEN 1 TABLET: 10; 325 TABLET ORAL at 12:02

## 2017-02-03 RX ADMIN — FUROSEMIDE 60 MG: 40 TABLET ORAL at 09:02

## 2017-02-03 RX ADMIN — ONDANSETRON 4 MG: 2 INJECTION INTRAMUSCULAR; INTRAVENOUS at 07:02

## 2017-02-03 RX ADMIN — LEVOTHYROXINE SODIUM 100 MCG: 100 TABLET ORAL at 07:02

## 2017-02-03 RX ADMIN — CLOPIDOGREL 75 MG: 75 TABLET, FILM COATED ORAL at 09:02

## 2017-02-03 RX ADMIN — POTASSIUM CHLORIDE 20 MEQ: 1500 TABLET, EXTENDED RELEASE ORAL at 09:02

## 2017-02-03 RX ADMIN — LISINOPRIL 2.5 MG: 2.5 TABLET ORAL at 09:02

## 2017-02-03 RX ADMIN — OXYBUTYNIN CHLORIDE 10 MG: 5 TABLET, EXTENDED RELEASE ORAL at 09:02

## 2017-02-03 NOTE — CONSULTS
Consult Note  Podiatry    Consult Requested By: Barrera Coppola MD  Reason for Consult: foot ulcers and edema, had pod. appointment for today    SUBJECTIVE     History of Present Illness:  Tasha Hawley is a 61 y.o. female  has a past medical history of Allergy; Anxiety; Arthritis; Carotid artery occlusion; Cataract; Depression; Edema; Fever blister; Hypothyroid; Iron deficiency anemia; Joint pain; Lumbar radiculopathy; Ocular migraine; and Sleep apnea. Admitted for acute cystitis with hematuria. Consulted to podiatry for foot ulcers and edema, with podiatry appointment originally scheduled for today. Pt known to podiatry and is a pt of Dr. Knox, last seen 12/8/15. Pt has been followed by podiatry outside of [facility] since then. Pt relates home health wraps her feet with unna boots twice per week for lymphedema. Pt with multiple back surgeries and chronic pain to alyson feet. Pt relates her legs are looking better. No other complaints at this time.     Scheduled Meds:   aspirin  81 mg Oral Daily    atorvastatin  80 mg Oral QHS    buPROPion  300 mg Oral Daily    cefTRIAXone (ROCEPHIN) IVPB  1 g Intravenous Q24H    clopidogrel  75 mg Oral Daily    enoxaparin  40 mg Subcutaneous Daily    fluoxetine  40 mg Oral Daily    furosemide  60 mg Oral Daily    ibuprofen  800 mg Intravenous Once    levothyroxine  100 mcg Oral Before breakfast    lidocaine  1 patch Transdermal Q24H    lisinopril  2.5 mg Oral Daily    oxybutynin  10 mg Oral Daily    pantoprazole  40 mg Oral Daily    polyethylene glycol  17 g Oral BID    potassium chloride SA  20 mEq Oral Daily    senna-docusate 8.6-50 mg  1 tablet Oral BID     Continuous Infusions:   PRN Meds:acetaminophen, alprazolam, hydrocodone-acetaminophen 10-325mg, hyoscyamine, lactulose, naloxone, naproxen, ondansetron, ondansetron    Review of patient's allergies indicates:   Allergen Reactions    Imitrex [sumatriptan succinate] Shortness Of Breath    Penicillins  Shortness Of Breath     Other reaction(s): Jittery    Percocet [oxycodone-acetaminophen] Anaphylaxis    Topamax [topiramate] Shortness Of Breath    Vancomycin Shortness Of Breath     Rash    (d)-limonene flavor      Other reaction(s): difficult intubation  Other reaction(s): Jittery  Other reaction(s): Difficulty breathing  Other reaction(s): Difficulty breathing  Other reaction(s): Jittery  Other reaction(s): Difficulty breathing    Bactrim [sulfamethoxazole-trimethoprim] Nausea And Vomiting     Other reaction(s): Resp Depression  Other reaction(s): Anaphylaxis    Butorphanol tartrate     Darvocet a500 [propoxyphene n-acetaminophen]      Other reaction(s): Jittery  Other reaction(s): Difficulty breathing    Fentanyl      Other reaction(s): Vomiting  Other reaction(s): Nausea  Other reaction(s): Itching  swelling    Phenytoin sodium extended      pATIENT DENIES EVER HAVING THIS MEDICATION    White petrolatum-zinc     Zinc oxide-white petrolatum      Other reaction(s): Difficulty breathing    Latex, natural rubber Itching and Rash        Past Medical History   Diagnosis Date    Allergy     Anxiety     Arthritis     Carotid artery occlusion     Cataract     Depression     Edema      chronic    Fever blister     Hypothyroid     Iron deficiency anemia     Joint pain     Lumbar radiculopathy      with chronic pain    Ocular migraine     Sleep apnea      cpap     Past Surgical History   Procedure Laterality Date    Hysterectomy  1975     endometriosis    Back surgery       x 12    Pain pump placement      Spine surgery  5-13-13     CERVICAL FUSION    Cataract extraction w/  intraocular lens implant Left      Dr Coleman      Family History   Problem Relation Age of Onset    Cancer Mother 55     breast    Cancer Father      esophagus,had laryngectomy    Parkinsonism Maternal Grandmother     Tremor Maternal Grandmother     No Known Problems Brother     No Known Problems Brother     Heart  disease Maternal Uncle     No Known Problems Sister     No Known Problems Maternal Aunt     No Known Problems Paternal Aunt     No Known Problems Paternal Uncle     No Known Problems Maternal Grandfather     No Known Problems Paternal Grandmother     No Known Problems Paternal Grandfather     Melanoma Neg Hx     Amblyopia Neg Hx     Blindness Neg Hx     Cataracts Neg Hx     Diabetes Neg Hx     Glaucoma Neg Hx     Hypertension Neg Hx     Macular degeneration Neg Hx     Retinal detachment Neg Hx     Strabismus Neg Hx     Stroke Neg Hx     Thyroid disease Neg Hx      Social History   Substance Use Topics    Smoking status: Never Smoker    Smokeless tobacco: Never Used    Alcohol use No      Comment: denies       Review of Systems:  Constitutional: no fever or chills  Respiratory: no cough or shortness of breath  Cardiovascular: no chest pain or palpitations  Gastrointestinal: no nausea or vomiting, tolerating diet  Integument/Breast: no rash or pruritis  Musculoskeletal: no arthralgias or myalgias  Neurological: decreased sensation to alyson feet  Behavioral/Psych: no auditory or visual hallucinations    OBJECTIVE     Vital Signs (Most Recent):  Temp: 97.7 °F (36.5 °C) (02/03/17 0422)  Pulse: 65 (02/03/17 0500)  Resp: 16 (02/03/17 0422)  BP: (!) 116/58 (02/03/17 0422)  SpO2: 95 % (02/03/17 0422)    Vital Signs Range (Last 24H):  Temp:  [97.6 °F (36.4 °C)-97.8 °F (36.6 °C)]   Pulse:  [58-74]   Resp:  [16-18]   BP: (102-134)/(53-63)   SpO2:  [94 %-97 %]     Physical Exam:  GENERAL: alert, cooperative  VASCULAR: Dorsalis Pedis: Left: 1+ (weak) Right: 1+ (weak)       Posterior Tibialis: Left: 1+ (weak) Right: 1+ (weak)       Edema: Left: moderate Right: moderate  NEURO:      SWMF: Left: decreased Right: decreased  ORTHO: General Alignment: Left: adequate digital alignment Right: adequate digital alignment  DERM: Hair: Left: present Right: present       Nails: Left: normal nails without lesions Right:  normal nails without lesions     Small 1x1 cm eschar noted to lateral aspect of distal 3rd digit and medial aspect of distal 4th digits. No drainage or SOI noted.                 Laboratory:  CBC:   Recent Labs  Lab 02/03/17  0402   WBC 3.94   RBC 3.99*   HGB 11.0*   HCT 35.6*      MCV 89   MCH 27.6   MCHC 30.9*     BMP:   Recent Labs  Lab 02/02/17  0459 02/03/17  0402   GLU 92  92 81     140 139   K 3.6  3.6 4.2     103 103   CO2 30*  30* 29   BUN 11  11 7   CREATININE 0.7  0.7 0.8   CALCIUM 8.5*  8.5* 9.1   MG 1.8  --      CMP:   Recent Labs  Lab 02/01/17  1510  02/03/17  0402   GLU 84  < > 81   CALCIUM 9.1  < > 9.1   ALBUMIN 3.4*  --   --    PROT 7.1  --   --      < > 139   K 3.3*  < > 4.2   CO2 36*  < > 29   CL 99  < > 103   BUN 10  < > 7   CREATININE 0.7  < > 0.8   ALKPHOS 124  --   --    ALT 8*  --   --    AST 16  --   --    BILITOT 0.5  --   --    < > = values in this interval not displayed.  ESR: No results for input(s): SEDRATE in the last 168 hours.  CRP: No results for input(s): CRP in the last 168 hours.  Coagulation: No results for input(s): INR, APTT in the last 168 hours.    Invalid input(s): PT    Diagnostic Results:  X-ray: none ordered  MRI: none ordered    ASSESSMENT/PLAN     Assessment:  -Lymphedema  -Lumbar radiculopathy    Plan:  -No acute surgical intervention at this time, pt with lymphedema, and minor excoriations to digits 3 and 4 with no SOI noted  -Lower extremity was wrapped with 2 rolls of kerlix and 2 rolls of ACE to each leg  -Pt to see outpt podiatrist for f/u  -HH to change unna boots after D/C, to see on regular scheduled basis as before  -Appreciate the consult, podiatry will follow  -If questions, please contact the on-call podiatry resident    Thank you,     Tasha Schofield  DPM PGY-1  [phone]

## 2017-02-03 NOTE — PROGRESS NOTES
D/c instructions reviewed and given. IV removed with catheter intact. Pt at bedside waiting on family

## 2017-02-03 NOTE — PLAN OF CARE
[facility]    HOME HEALTH ORDERS  FACE TO FACE ENCOUNTER    Patient Name: Tasha Hawley  YOB: 1955    PCP: Mesfin Hodges Ii, MD   PCP Address: [address] / [place] [address]  PCP Phone Number: [phone]  PCP Fax: [phone]    Encounter Date: 02/03/2017    Admit to Home Health    Diagnoses:  Active Hospital Problems    Diagnosis  POA    *Acute cystitis with hematuria [N30.01]  Yes     Priority: 1 - High    Neurogenic bladder [N31.9]  Yes     Priority: 1 - High    Lethargy [R53.83]  Yes     Priority: 2     Narcotic dependency, continuous [F11.20]  Yes     Priority: 2     Chronic pain [G89.29]  Yes     Priority: 2     Lumbar radiculopathy [M54.16]  Yes     Priority: 2      with chronic pain      Fall [W19.XXXA]  Yes     Priority: 3     Primary hypothyroidism [E03.9]  Yes    Weight loss, unintentional [R63.4]  Yes    GERD (gastroesophageal reflux disease) [K21.9]  Yes    Coronary artery disease involving native coronary artery of native heart without angina pectoris [I25.10]  Yes    Combined systolic and diastolic cardiac dysfunction [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]  Yes    Constipation [K59.00]  Yes    Iron deficiency anemia [D50.9]  Yes    Anxiety [F41.9]  Yes      Resolved Hospital Problems    Diagnosis Date Resolved POA    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    Specialty:  Internal Medicine    Why:  10:00am  Primary Care    Contact information:    [address]  [facility] [address]  [phone]          Please follow up.    Why:  Follow up with your pain medicine physician.        Follow  up with Shireen Mayo MD.    Specialty:  Urology    Why:  they will call you to schedule follow up    Contact information:    [address]  [facility] [address]  [phone]          Please follow up.    Why:  Home Health/ [facility]            I have seen and examined this patient face to face today. My clinical findings that support the need for the home health skilled services and home bound status are the following:  Weakness/numbness causing balance and gait disturbance due to Infection, Weakness/Debility and Anemia making it taxing to leave home.  Requiring assistive device to leave home due to unsteady gait caused by  Infection, Weakness/Debility and Anemia.  Patient with medication mismanagement issues requiring home bound status as evidenced by  Poor understanding of medication regimen/dosage.  Medical restrictions requiring assistance of another human to leave home due to  Unstable ambulation, Frequent Falls, Decreased range of motions in extremities, IV infusion Needs and Wound care needs.    Allergies:  Review of patient's allergies indicates:   Allergen Reactions    Imitrex [sumatriptan succinate] Shortness Of Breath    Penicillins Shortness Of Breath     Other reaction(s): Jittery    Percocet [oxycodone-acetaminophen] Anaphylaxis    Topamax [topiramate] Shortness Of Breath    Vancomycin Shortness Of Breath     Rash    (d)-limonene flavor      Other reaction(s): difficult intubation  Other reaction(s): Jittery  Other reaction(s): Difficulty breathing  Other reaction(s): Difficulty breathing  Other reaction(s): Jittery  Other reaction(s): Difficulty breathing    Bactrim [sulfamethoxazole-trimethoprim] Nausea And Vomiting     Other reaction(s): Resp Depression  Other reaction(s): Anaphylaxis    Butorphanol tartrate     Darvocet a500 [propoxyphene n-acetaminophen]      Other reaction(s): Jittery  Other reaction(s): Difficulty breathing    Fentanyl      Other reaction(s):  Vomiting  Other reaction(s): Nausea  Other reaction(s): Itching  swelling    Phenytoin sodium extended      pATIENT DENIES EVER HAVING THIS MEDICATION    White petrolatum-zinc     Zinc oxide-white petrolatum      Other reaction(s): Difficulty breathing    Latex, natural rubber Itching and Rash       Diet: cardiac diet    Activities: activity as tolerated    Nursing:   SN to complete comprehensive assessment including routine vital signs. Instruct on disease process and s/s of complications to report to MD. Review/verify medication list sent home with the patient at time of discharge  and instruct patient/caregiver as needed. Frequency may be adjusted depending on start of care date.    Notify MD if SBP > 160 or < 90; DBP > 90 or < 50; HR > 120 or < 50; Temp > 101; Other:         CONSULTS:    Physical Therapy to evaluate and treat. Evaluate for home safety and equipment needs; Establish/upgrade home exercise program. Perform / instruct on therapeutic exercises, gait training, transfer training, and Range of Motion.  Occupational Therapy to evaluate and treat. Evaluate home environment for safety and equipment needs. Perform/Instruct on transfers, ADL training, ROM, and therapeutic exercises.   to evaluate for community resources/long-range planning.  Aide to provide assistance with personal care, ADLs, and vital signs.    MISCELLANEOUS CARE:  Motley Care: Instruct patient/caregiver to empty Motley bag.  Change Motley every month.    WOUND CARE ORDERS  yes:  Foot Ulcer:  Location: Right, minor excoriations to digits 3 and 4    Lower extremity was wrapped with 2 rolls of kerlix and 2 rolls of ACE to each leg, HH to change unna boots after D/C        Medications: Review discharge medications with patient and family and provide education.      Discharge Medication List as of 2/3/2017 10:32 AM      CONTINUE these medications which have CHANGED    Details   levothyroxine (SYNTHROID) 100 MCG tablet Take 1  tablet (100 mcg total) by mouth before breakfast., Starting 2/3/2017, Until Discontinued, Normal      polyethylene glycol (GLYCOLAX) 17 gram PwPk Take 17 g by mouth 2 (two) times daily., Starting 2/3/2017, Until Discontinued, Normal         CONTINUE these medications which have NOT CHANGED    Details   alprazolam (XANAX) 0.25 MG tablet Take 1 tablet (0.25 mg total) by mouth once daily., Starting 1/13/2017, Until Wed 2/1/17, Normal      aspirin (ECOTRIN) 81 MG EC tablet Take 1 tablet (81 mg total) by mouth once daily., Starting 1/7/2016, Until Wed 2/1/17, OTC      atorvastatin (LIPITOR) 80 MG tablet Take 1 tablet (80 mg total) by mouth once daily., Starting 12/15/2016, Until Fri 12/15/17, Normal      buPROPion (WELLBUTRIN XL) 150 MG TB24 tablet Take 2 tablets (300 mg total) by mouth once daily., Starting 12/6/2016, Until Discontinued, Normal      BUTALBITAL/ASPIRIN/CAFFEINE (FIORINAL ORAL) Take by mouth as needed., Until Discontinued, Historical Med      clopidogrel (PLAVIX) 75 mg tablet Take 1 tablet (75 mg total) by mouth once daily., Starting 12/12/2016, Until Discontinued, Normal      furosemide (LASIX) 40 MG tablet Take 1 tablet (40 mg total) by mouth 2 (two) times daily., Starting 10/25/2016, Until Discontinued, Normal      hyoscyamine (ANASPAZ,LEVSIN) 0.125 mg Tab Take 1 tablet (125 mcg total) by mouth every 6 (six) hours as needed., Starting 12/7/2016, Until Wed 2/1/17, Normal      lisinopril (PRINIVIL,ZESTRIL) 2.5 MG tablet Take 1 tablet (2.5 mg total) by mouth once daily., Starting 12/27/2016, Until Wed 12/27/17, Normal      ondansetron (ZOFRAN) 8 MG tablet Take 1 tablet (8 mg total) by mouth every 12 (twelve) hours as needed for Nausea., Starting 10/25/2016, Until Discontinued, Normal      pantoprazole (PROTONIX) 40 MG tablet Take 1 tablet (40 mg total) by mouth once daily., Starting 11/8/2016, Until Discontinued, Normal      potassium chloride SA (K-DUR,KLOR-CON) 20 MEQ tablet Take 1 tablet (20 mEq total)  by mouth 2 (two) times daily., Starting 11/28/2016, Until Discontinued, Normal      ranitidine (ZANTAC) 150 MG capsule Take 150 mg by mouth daily as needed. , Until Discontinued, Historical Med      SENNOSIDES (SENNA LAX ORAL) Take by mouth as needed., Until Discontinued, Historical Med      tolterodine (DETROL LA) 4 MG 24 hr capsule Starting 1/18/2017, Until Discontinued, Historical Med      docusate sodium (COLACE) 100 MG capsule Take 1 capsule (100 mg total) by mouth 3 (three) times daily as needed for Constipation., Starting 12/29/2016, Until Discontinued, Normal      fluoxetine (PROZAC) 20 MG capsule Take 2 capsules (40 mg total) by mouth once daily., Starting 12/6/2016, Until Discontinued, Normal      hydrocodone-acetaminophen 10-325mg (NORCO)  mg Tab Take 1 tablet by mouth every 6 (six) hours as needed., Starting 11/8/2016, Until Discontinued, Print         STOP taking these medications       lamotrigine (LAMICTAL) 25 MG tablet Comments:   Reason for Stopping:               I certify that this patient is confined to her home and needs intermittent skilled nursing care, physical therapy and occupational therapy.

## 2017-02-03 NOTE — PLAN OF CARE
02/03/17 1709   Final Note   Assessment Type Final Discharge Note   Discharge Disposition Home-Health   Discharge planning education complete? Yes   Hospital Follow Up  Appt(s) scheduled? Yes   Referral to / orders for Home Health Complete? Yes   Did you assess the readiness or willingness of the family or caregiver to support self management of care? Yes   Right Care Referral Info   Post Acute Recommendation Home-care     Patient wanted to continue services with [facility] however agency unwilling to provide services at this time.  sent referral to [facility].  Patient's family provided transportation to home.  No other discharge needs noted at this time.    Future Appointments  Date Time Provider Department Center   2/7/2017 10:00 AM Mesfin Hodges II, MD [facility]   2/15/2017 9:30 AM Erik Oconnor MD [facility] PSYCH [facility]     Follow-up With  Details  Why  Contact Info   Mesfin Hodges II  On 2/7/2017  10:00am Primary Care  [address]  [facility] [address]  [phone]         Follow up with your pain medicine physician.     Shireen Mayo    they will call you to schedule follow up  [address]  [facility] [address]  [phone]         [facility]/ [facility]

## 2017-02-04 LAB
BACTERIA UR CULT: NORMAL
BACTERIA UR CULT: NORMAL

## 2017-02-05 RX ORDER — CIPROFLOXACIN 500 MG/1
500 TABLET ORAL 2 TIMES DAILY
Qty: 20 TABLET | Refills: 0 | Status: SHIPPED | OUTPATIENT
Start: 2017-02-05 | End: 2017-02-15

## 2017-02-05 NOTE — DISCHARGE SUMMARY
"[facility] Hospital Medicine  Discharge Summary      Patient Name: Tasha Hawley  MRN: [id number]  Admission Date: 2/1/2017  Hospital Length of Stay: 0 days  Discharge Date and Time: 2/5/2017 1:30 PM  Attending Physician: Winifred att. providers found   Discharging Provider: Tomer Puckett PA-C  Primary Care Provider: Mesfin Hodges Ii, MD  [facility] Medicine Team: [facility]OSP MED E Tomer Puckett PA-C    HPI:   Patient is a 61 y.o. female with significant past medical history of CAD, CHF, chronic back pain and neurogenic bladder presented to hospital complaining of fall and decreased level of consciousness. Pt was found down by a family member "unconsciousness," unknown down time. Per chart review, both PCP and psych have documented lethargy, frequent falls & polypharmacy dating back to November. Pt has SQ dilaudid pump (last adjusted ~ talia), as well as norco for break through pain and daily xanax (decreased recently from BID).   Pt incidentally also recently had a suprapubic catheter exchange (1/23) followed by cystoscopy and botox injection (1/26) for urethral incontinence. Pt reports that she has had hematuria since procedure was done.   The patient currently complains of chronic lower back pain radiating to bilateral lower extremities and constipation (LBM last week, although states it has been "weeks" since her last good BM). The patient also endorses an unintentional 60lb weight loss over the last month.     Work up in the ED revealed a normocytic anemia (11/35), grossly normal chemistry with exception of mild hypokalemia (3.3), Ck and troponin were WNL. TSH was elevated at 22, however Free T4 is WNL. ETOH was neg. Utox was positive for bzo, opiates and barbituates. UA was grossly positive. CT was neg for acute intracranial abnormality. CXRY was neg for acute cardiopulmonary dz. Pelvic xray was neg for acute fracture. The patient was admitted to IM OBS for further management.       * No " surgery found *      Indwelling Lines/Drains at time of discharge:   Lines/Drains/Airways     Drain                 Suprapubic Catheter latex -- days         Urethral Catheter 11/08/16 1239 Straight-tip;Non-latex 22 Fr. 89 days              Hospital Course:   Patient placed into observation for evaluation of decreased level of consciousness that responded to narcan by EMS. Patient states she has been stable on pain medications for a while and does not abuse her medications. She has been requesting additional pain medications while here (we have held her opiate breakthrough pain medications while in house), however, with her superimposed UTI she may be increasingly sensitive to the additional medications. She was explained the concern regarding another possible overdose episode and voiced agreement that she can resume those medications once she is adequately treated for her UTI. Pain management was consulted, but they are unable to modify her pain pump at this time and recommended she follow up with her chronic pain physician for modification. Endocrinology was consulted for elevated TSH at 22 and free T4 1.0. They recommend patient decrease her LT4 to 100 mcg daily. Patient states she is non-compliant at times and takes medications irregularly other times. Patient discharged on doxy and cipro as indicated by cultures. Episode likely 2/2 overdose of opiates, patient reports recent weight loss. Pain management in hospital was unable to adjust her pain pump, so this will be defered to her pain management.      Consults:   Consults         Status Ordering Provider     Inpatient consult to Dietary  Once     Provider:  (Not yet assigned)    Completed AILYN ROWE     Inpatient consult to Endocrinology  Once     Provider:  (Not yet assigned)    Final result AILYN ROWE     Inpatient consult to Podiatry  Once     Provider:  (Not yet assigned)    Final result CHELE DEGROOT          Significant Diagnostic Studies:  Labs: CMP No results for input(s): NA, K, CL, CO2, GLU, BUN, CREATININE, CALCIUM, PROT, ALBUMIN, BILITOT, ALKPHOS, AST, ALT, ANIONGAP, ESTGFRAFRICA, EGFRNONAA in the last 48 hours., CBC No results for input(s): WBC, HGB, HCT, PLT in the last 48 hours. and All labs within the past 24 hours have been reviewed    Pending Diagnostic Studies:     Procedure Component Value Units Date/Time    2D echo with color flow doppler [[id number]]     Order Status:  Sent Lab Status:  No result         Final Active Diagnoses:    Diagnosis Date Noted POA    PRINCIPAL PROBLEM:  Acute cystitis with hematuria [N30.01] 02/01/2017 Yes    Neurogenic bladder [N31.9] 09/27/2016 Yes    Lethargy [R53.83] 02/01/2017 Yes    Narcotic dependency, continuous [F11.20] 07/18/2014 Yes    Chronic pain [G89.29] 05/01/2013 Yes    Lumbar radiculopathy [M54.16]  Yes    Fall [W19.XXXA] 02/01/2017 Yes    Weight loss, unintentional [R63.4] 02/01/2017 Yes    GERD (gastroesophageal reflux disease) [K21.9] 08/16/2016 Yes    Coronary artery disease involving native coronary artery of native heart without angina pectoris [I25.10] 08/16/2016 Yes    Combined systolic and diastolic cardiac dysfunction [I51.89] 08/16/2016 Yes    Constipation [K59.00] 08/16/2016 Yes    Iron deficiency anemia [D50.9]  Yes    Anxiety [F41.9]  Yes      Problems Resolved During this Admission:    Diagnosis Date Noted Date Resolved POA    Subclinical iodine-deficiency hypothyroidism [E02]  02/02/2017 Yes      * Acute cystitis with hematuria  - suprapubic catheter present (last changed 1/23) & s/p cystoscopy & botox injection 1/26  - UA with occasional bacteria, > 100 WBCx, 3+ leuks & + nitrites  - received x1 dose rocephin in ED  - unclear if specimen collected was from leg bag or directly from catheter > will repeat UA/Ucx on specimen directly from catheter, patient states that her urine has been increasingly darkened and had become bloody recently, repeat UA +UTI  - Discharged  with cipro and doxy for MRSA and E. Faecalis, follow up with urology on discharge    Neurogenic bladder  - s/p cystoscopy & botox injection 1/26  - continue home levsin and detrol   - no gross hematuria on exam, H/H stable, will continue dual antiplatelet therapy    Lumbar radiculopathy  - pain plan as above  - PT/OT: recommended SNF, patient decline    Chronic pain  - pain plan as above    Narcotic dependency, continuous  - dilaudid pump SQ > pain management consult as above  - Breakthrough with norco 10 once daily PRN, naproxen, tylenol, lidoderm  - narcan prn  - patient likely had overdose of pain medications, she said she took a Fioricet late at night and then took another pill in the AM when she was found unconsciousness. recommend down titration of pain medication per chronic pain physician outpatient  - inpatient pain management unable to modify existing pain pump.    Lethargy  - suspect 2/2 polypharmacy. Multiple PCP & psych visits document lethargy & frequent falls in HPI dating back to November with suspected polypharmacy as etiology (pt on xanax & opioids); xanax recently decreased from BID to daily   - pt required dose of narcan per EMS with positive response  - will hold home norco, restart xanax  - pt has SQ dilaudid infusion pump > pain management consulted, down-titration will have to be conducted by chronic pain management outpatient  - narcan prn  - possible etiology of subclinical hypothyroidism (TSH 22, Free T4 1) > endocrine consulted, likely 2/2 inconsistent regimen, decrease to 100 mcg and monitor outpatient  - head CT neg for acute intracranial abnormality > unable to obtain MRI 2/2 infusion pump    Fall  - suspect 2/2 polypharmacy as above  - PT/OT eval: recommending SNF, CM/SW to discuss with patient, patient already with HH    Subclinical iodine-deficiency hypothyroidism, resolved as of 2/2/2017  - TSH 22, free T4 1  - continue home synthroid  - endocrine consulted: reduce LT4 to 100 mcg  per endo reccs, patient taking medication irregularly and contributing to lab findings. Education provided to patient. Follow up outpatient.     Anxiety  - patient states she takes mostly in AM and occasionally at night, will resume as patient is more alert    Iron deficiency anemia  - H/H 11/35 > monitor    Combined systolic and diastolic cardiac dysfunction  - last 2D echo & cath 1/2016 (see associated reports)  - continue home lisinopril    Constipation  - LBM last week, patient with BM while in hospital  - suspect 2/2 opioid use & possible subclinical hypothyroidism  - continue aggressive bowel regimen    GERD (gastroesophageal reflux disease)  - continue home PPI    Coronary artery disease involving native coronary artery of native heart without angina pectoris  - continue home dual antiplatelet therapy, ACE and statin    Weight loss, unintentional  - unclear etiology; possibly 2/2 increased lethargy resulting in decreased PO intake   - dietary consult: Boost BID  - check tumor markers to r/o underlying ca: AFP negative,  negative      Discharged Condition: fair    Disposition: Home or Self Care    Follow Up:  Follow-up Information     Follow up with Mesfin Hodges Ii, MD On 2/7/2017.    Specialty:  Internal Medicine    Why:  10:00am  Primary Care    Contact information:    [address]  [facility] [address]  [phone]          Please follow up.    Why:  Follow up with your pain medicine physician.        Follow up with Shireen Miner MD.    Specialty:  Urology    Why:  they will call you to schedule follow up    Contact information:    [address]  [facility] [address]  [phone]          Please follow up.    Why:  Home Health/ [facility]        Patient Instructions:     Ambulatory Referral to Urology   Referral Priority: Routine Referral Type: Consultation   Referral Reason: Specialty Services Required    Referred to Provider: SHIREEN MINER Requested Specialty: Urology    Number of Visits Requested: 1      Diet Cardiac     Activity as tolerated     Call MD for:  increased confusion or weakness     Call MD for:  persistent dizziness, light-headedness, or visual disturbances       Medications:  Reconciled Home Medications:   Discharge Medication List as of 2/3/2017 10:32 AM      CONTINUE these medications which have CHANGED    Details   levothyroxine (SYNTHROID) 100 MCG tablet Take 1 tablet (100 mcg total) by mouth before breakfast., Starting 2/3/2017, Until Discontinued, Normal      polyethylene glycol (GLYCOLAX) 17 gram PwPk Take 17 g by mouth 2 (two) times daily., Starting 2/3/2017, Until Discontinued, Normal         CONTINUE these medications which have NOT CHANGED    Details   alprazolam (XANAX) 0.25 MG tablet Take 1 tablet (0.25 mg total) by mouth once daily., Starting 1/13/2017, Until Wed 2/1/17, Normal      aspirin (ECOTRIN) 81 MG EC tablet Take 1 tablet (81 mg total) by mouth once daily., Starting 1/7/2016, Until Wed 2/1/17, OTC      atorvastatin (LIPITOR) 80 MG tablet Take 1 tablet (80 mg total) by mouth once daily., Starting 12/15/2016, Until Fri 12/15/17, Normal      buPROPion (WELLBUTRIN XL) 150 MG TB24 tablet Take 2 tablets (300 mg total) by mouth once daily., Starting 12/6/2016, Until Discontinued, Normal      BUTALBITAL/ASPIRIN/CAFFEINE (FIORINAL ORAL) Take by mouth as needed., Until Discontinued, Historical Med      clopidogrel (PLAVIX) 75 mg tablet Take 1 tablet (75 mg total) by mouth once daily., Starting 12/12/2016, Until Discontinued, Normal      docusate sodium (COLACE) 100 MG capsule Take 1 capsule (100 mg total) by mouth 3 (three) times daily as needed for Constipation., Starting 12/29/2016, Until Discontinued, Normal      fluoxetine (PROZAC) 20 MG capsule Take 2 capsules (40 mg total) by mouth once daily., Starting 12/6/2016, Until Discontinued, Normal      furosemide (LASIX) 40 MG tablet Take 1 tablet (40 mg total) by mouth 2 (two) times daily., Starting  10/25/2016, Until Discontinued, Normal      hydrocodone-acetaminophen 10-325mg (NORCO)  mg Tab Take 1 tablet by mouth every 6 (six) hours as needed., Starting 11/8/2016, Until Discontinued, Print      lisinopril (PRINIVIL,ZESTRIL) 2.5 MG tablet Take 1 tablet (2.5 mg total) by mouth once daily., Starting 12/27/2016, Until Wed 12/27/17, Normal      ondansetron (ZOFRAN) 8 MG tablet Take 1 tablet (8 mg total) by mouth every 12 (twelve) hours as needed for Nausea., Starting 10/25/2016, Until Discontinued, Normal      pantoprazole (PROTONIX) 40 MG tablet Take 1 tablet (40 mg total) by mouth once daily., Starting 11/8/2016, Until Discontinued, Normal      potassium chloride SA (K-DUR,KLOR-CON) 20 MEQ tablet Take 1 tablet (20 mEq total) by mouth 2 (two) times daily., Starting 11/28/2016, Until Discontinued, Normal      ranitidine (ZANTAC) 150 MG capsule Take 150 mg by mouth daily as needed. , Until Discontinued, Historical Med      SENNOSIDES (SENNA LAX ORAL) Take by mouth as needed., Until Discontinued, Historical Med      hyoscyamine (ANASPAZ,LEVSIN) 0.125 mg Tab Take 1 tablet (125 mcg total) by mouth every 6 (six) hours as needed., Starting 12/7/2016, Until Wed 2/1/17, Normal      tolterodine (DETROL LA) 4 MG 24 hr capsule Starting 1/18/2017, Until Discontinued, Historical Med         STOP taking these medications       lamotrigine (LAMICTAL) 25 MG tablet Comments:   Reason for Stopping:             Time spent on the discharge of patient: 35 minutes    Tomer Puckett PA-C  Department of Hospital Medicine  [facility]

## 2017-02-05 NOTE — ASSESSMENT & PLAN NOTE
- patient states she takes mostly in AM and occasionally at night, will resume as patient is more alert

## 2017-02-05 NOTE — ASSESSMENT & PLAN NOTE
- suprapubic catheter present (last changed 1/23) & s/p cystoscopy & botox injection 1/26  - UA with occasional bacteria, > 100 WBCx, 3+ leuks & + nitrites  - received x1 dose rocephin in ED  - unclear if specimen collected was from leg bag or directly from catheter > will repeat UA/Ucx on specimen directly from catheter, patient states that her urine has been increasingly darkened and had become bloody recently, repeat UA +UTI  - Discharged with cipro and doxy for MRSA and E. Faecalis, follow up with urology on discharge

## 2017-02-05 NOTE — ASSESSMENT & PLAN NOTE
- dilaudid pump SQ > pain management consult as above  - Breakthrough with norco 10 once daily PRN, naproxen, tylenol, lidoderm  - narcan prn  - patient likely had overdose of pain medications, she said she took a Fioricet late at night and then took another pill in the AM when she was found unconsciousness. recommend down titration of pain medication per chronic pain physician outpatient  - inpatient pain management unable to modify existing pain pump.

## 2017-02-05 NOTE — ASSESSMENT & PLAN NOTE
- suspect 2/2 polypharmacy as above  - PT/OT eval: recommending SNF, CM/SW to discuss with patient, patient already with HH

## 2017-02-06 LAB
BACTERIA BLD CULT: NORMAL
BACTERIA BLD CULT: NORMAL
BACTERIA UR CULT: NORMAL

## 2017-02-08 NOTE — PT/OT/SLP DISCHARGE
Physical Therapy Discharge Summary    Tasha Hawley  MRN: [id number]   Acute cystitis with hematuria   Patient Discharged from acute Physical Therapy on 2/3/2017.  Please refer to prior PT noted date on 2017 for functional status.     Assessment:   Patient was discharge unexpectedly.  Information required to complete and accurate discharge summary is unknown.  Refer to therapy initial evaluation and last progress note for initial and most recent functional status and goal achievement.  Recommendations made may be found in medical record.  GOALS:   Physical Therapy Goals        Problem: Physical Therapy Goal    Goal Priority Disciplines Outcome Goal Variances Interventions   Physical Therapy Goal     PT/OT, PT Ongoing (interventions implemented as appropriate)     Description:  Goals to be met by: 2017     Patient will increase functional independence with mobility by performin. Supine to sit with Set-up [place]  2. Sit to supine with Set-up [place]  3. Sit to stand transfer with Supervision  4. Bed to chair transfer with Stand-by Assistance using Rolling Walker  5. Gait  x 50 feet with Contact Guard Assistance using Rolling Walker.   6. Stand for 8 minutes with Stand-by Assistance using Rolling Walker  7. Lower extremity exercise program x 15 reps per handout, with assistance as needed              Reasons for Discontinuation of Therapy Services  Transfer to alternate level of care.      Plan:  Patient Discharged to: Home with Home Health Service.    Karla Delgadillo, PT, DPT  2017  Pager: [phone]

## 2017-02-09 ENCOUNTER — TELEPHONE (OUTPATIENT)
Dept: UROLOGY | Facility: CLINIC | Age: 62
End: 2017-02-09

## 2017-02-09 NOTE — TELEPHONE ENCOUNTER
----- Message from Elida Cummings sent at 2/9/2017 10:33 AM CST -----  Contact: pt#[phone] or [phone]  Pt states that she has blood in tube. Pt wants to speak to Dr Mayo

## 2017-02-10 ENCOUNTER — TELEPHONE (OUTPATIENT)
Dept: UROLOGY | Facility: CLINIC | Age: 62
End: 2017-02-10

## 2017-02-10 NOTE — TELEPHONE ENCOUNTER
Pt's  (Dangelo) states he wanted to know if HH nurse could collect urine specimen while pt is at home and also to include her sptube changes because it is becoming too much for pt to get dressed and travel back and forth to clinic.

## 2017-02-10 NOTE — TELEPHONE ENCOUNTER
----- Message from Suyapa Batista sent at 2/10/2017 12:47 PM CST -----  Contact: pt  Dangelo [phone]  Pt  states he will cancel appt if no one calls him back in a few minutes. Pt is not having anymore blood in her bag  But would like to know if a culture can be done instead her pt coming into office by home health nurse and then sent to the office. Please call pt.     Pt's  asked to cancel the appointment and still have the doctor to call them immediately.

## 2017-02-15 ENCOUNTER — TELEPHONE (OUTPATIENT)
Dept: PSYCHIATRY | Facility: CLINIC | Age: 62
End: 2017-02-15

## 2017-02-15 NOTE — TELEPHONE ENCOUNTER
----- Message from Elida Spence MA sent at 2/15/2017  8:09 AM CST -----  Contact: pt   Pt called in stating she's unable to make her appt today. She asked if you can give her a call.   Cb# [phone]    Thank you.

## 2017-02-17 ENCOUNTER — TELEPHONE (OUTPATIENT)
Dept: CARDIOLOGY | Facility: CLINIC | Age: 62
End: 2017-02-17

## 2017-02-17 NOTE — TELEPHONE ENCOUNTER
Dr. Mooney, Nurse Phillip from [facility] [[phone]] called to report that the pt's HR today is 55 - says is to reoprt HR under 60 to the MD. Says that the pt is having no symptoms.  /86 RR 20.  Reviewed pt's cardiac meds - is taking lisinopril, Lasix, atorvastatin, potassium, and Plavix.  HR at  10-4-16 was 70.  Please advise. Thanks, Ines

## 2017-02-17 NOTE — TELEPHONE ENCOUNTER
----- Message from Elda Laws sent at 2/17/2017 12:27 PM CST -----  Contact: [facility](Phillip)   [facility](Phillip) called, requesting to report an abnormal heart rate. Pt was last seen by Dr. Flores on 10/4/16. Pt with Dr. Powers 1/20/16. Thank you

## 2017-02-19 RX ORDER — TRAZODONE HYDROCHLORIDE 50 MG/1
TABLET ORAL
Qty: 90 TABLET | Refills: 1 | Status: SHIPPED | OUTPATIENT
Start: 2017-02-19 | End: 2017-02-23

## 2017-02-23 ENCOUNTER — OFFICE VISIT (OUTPATIENT)
Dept: INTERNAL MEDICINE | Facility: CLINIC | Age: 62
End: 2017-02-23
Payer: MEDICARE

## 2017-02-23 ENCOUNTER — OFFICE VISIT (OUTPATIENT)
Dept: UROLOGY | Facility: CLINIC | Age: 62
End: 2017-02-23
Payer: MEDICARE

## 2017-02-23 VITALS
BODY MASS INDEX: 26.63 KG/M2 | SYSTOLIC BLOOD PRESSURE: 100 MMHG | TEMPERATURE: 98 F | HEART RATE: 96 BPM | HEIGHT: 64 IN | DIASTOLIC BLOOD PRESSURE: 56 MMHG | WEIGHT: 156 LBS

## 2017-02-23 VITALS — HEART RATE: 61 BPM | DIASTOLIC BLOOD PRESSURE: 48 MMHG | SYSTOLIC BLOOD PRESSURE: 92 MMHG

## 2017-02-23 DIAGNOSIS — K59.03 DRUG-INDUCED CONSTIPATION: ICD-10-CM

## 2017-02-23 DIAGNOSIS — M54.50 CHRONIC BILATERAL LOW BACK PAIN WITHOUT SCIATICA: ICD-10-CM

## 2017-02-23 DIAGNOSIS — N31.9 NEUROGENIC BLADDER: Primary | ICD-10-CM

## 2017-02-23 DIAGNOSIS — G47.00 INSOMNIA, UNSPECIFIED TYPE: Primary | ICD-10-CM

## 2017-02-23 DIAGNOSIS — G89.29 CHRONIC BILATERAL LOW BACK PAIN WITHOUT SCIATICA: ICD-10-CM

## 2017-02-23 DIAGNOSIS — L92.9 GRANULATION TISSUE: ICD-10-CM

## 2017-02-23 DIAGNOSIS — Z43.5 ENCOUNTER FOR SUPRAPUBIC CATHETER CARE: ICD-10-CM

## 2017-02-23 DIAGNOSIS — E03.9 PRIMARY HYPOTHYROIDISM: ICD-10-CM

## 2017-02-23 DIAGNOSIS — M54.16 LUMBAR RADICULOPATHY: ICD-10-CM

## 2017-02-23 DIAGNOSIS — M41.9 SCOLIOSIS, UNSPECIFIED SCOLIOSIS TYPE, UNSPECIFIED SPINAL REGION: ICD-10-CM

## 2017-02-23 DIAGNOSIS — R60.0 BILATERAL LEG EDEMA: ICD-10-CM
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
## 2017-02-23 PROBLEM — N30.01 ACUTE CYSTITIS WITH HEMATURIA: Status: RESOLVED | Noted: 2017-02-01 | Resolved: 2017-02-23

## 2017-02-23 PROCEDURE — 1160F RVW MEDS BY RX/DR IN RCRD: CPT | Mod: S$GLB,,, | Performed by: NURSE PRACTITIONER

## 2017-02-23 PROCEDURE — 1160F RVW MEDS BY RX/DR IN RCRD: CPT | Mod: S$GLB,,, | Performed by: INTERNAL MEDICINE

## 2017-02-23 PROCEDURE — 99499 UNLISTED E&M SERVICE: CPT | Mod: S$GLB,,, | Performed by: INTERNAL MEDICINE

## 2017-02-23 PROCEDURE — 99999 PR PBB SHADOW E&M-EST. PATIENT-LVL III: CPT | Mod: PBBFAC,,, | Performed by: INTERNAL MEDICINE

## 2017-02-23 PROCEDURE — 99213 OFFICE O/P EST LOW 20 MIN: CPT | Mod: 25,S$GLB,, | Performed by: NURSE PRACTITIONER

## 2017-02-23 PROCEDURE — 99499 UNLISTED E&M SERVICE: CPT | Mod: S$GLB,,, | Performed by: NURSE PRACTITIONER

## 2017-02-23 PROCEDURE — 99999 PR PBB SHADOW E&M-EST. PATIENT-LVL III: CPT | Mod: PBBFAC,,, | Performed by: NURSE PRACTITIONER

## 2017-02-23 PROCEDURE — 51705 CHANGE OF BLADDER TUBE: CPT | Mod: S$GLB,,, | Performed by: NURSE PRACTITIONER

## 2017-02-23 PROCEDURE — 99215 OFFICE O/P EST HI 40 MIN: CPT | Mod: S$GLB,,, | Performed by: INTERNAL MEDICINE

## 2017-02-23 NOTE — PROGRESS NOTES
Subjective:       Patient ID: Tasha Hawley is a 61 y.o. female.    Chief Complaint: Follow-up (spt change)    HPI Comments: Mrs. Hawley is a 61 y.o. female neurogenic bladder, incomplete bladder emptying, incontinence.  She had SPT placed 11/08/2016 to manage her neurogenic bladder with Dr. Mayo.  She is also s/p Botox injection on Jan 26,2017.  She is here today for her SPT to be changed.  She currently has a 20fr Silicone Catheter in place.  It has been draining well.        Past Medical History:    Allergy                                                       Anxiety                                                       Arthritis                                                     Carotid artery occlusion                                      Cataract                                                      Depression                                                    Edema                                                           Comment:chronic    Fever blister                                                 Hypothyroid                                                   Iron deficiency anemia                                        Joint pain                                                    Lumbar radiculopathy                                            Comment:with chronic pain    Ocular migraine                                               Sleep apnea                                                     Comment:cpap    Past Surgical History:    HYSTERECTOMY                                     1975            Comment:endometriosis    BACK SURGERY                                                     Comment:x 12    pain pump placement                                            SPINE SURGERY                                    5-13-13         Comment:CERVICAL FUSION    CATARACT EXTRACTION W/  INTRAOCULAR LENS IMPLA* Left                 Comment:Dr Coleman     Review of patient's family history indicates:     Cancer                         Mother                      Comment: breast    Cancer                         Father                      Comment: esophagus,had laryngectomy    Parkinsonism                   Maternal Grandmother      Tremor                         Maternal Grandmother      No Known Problems              Brother                   No Known Problems              Brother                   Heart disease                  Maternal Uncle            No Known Problems              Sister                    No Known Problems              Maternal Aunt             No Known Problems              Paternal Aunt             No Known Problems              Paternal Uncle            No Known Problems              Maternal Grandfather      No Known Problems              Paternal Grandmother      No Known Problems              Paternal Grandfather      Melanoma                       Neg Hx                    Amblyopia                      Neg Hx                    Blindness                      Neg Hx                    Cataracts                      Neg Hx                    Diabetes                       Neg Hx                    Glaucoma                       Neg Hx                    Hypertension                   Neg Hx                    Macular degeneration           Neg Hx                    Retinal detachment             Neg Hx                    Strabismus                     Neg Hx                    Stroke                         Neg Hx                    Thyroid disease                Neg Hx                      Social History    Marital status:              Spouse name:                       Years of education:                 Number of children:               Occupational History    None on file    Social History Main Topics    Smoking status: Never Smoker                                                                Smokeless status: Never Used                        Alcohol use: No                  Comment: denies    Drug use: No              Sexual activity: No                   Other Topics            Concern    None on file    Social History Narrative    None on file        Allergies:  Imitrex (sumatriptan succinate); Penicillins; Percocet (oxycodone-acetaminophen); Topamax (topiramate); Vancomycin; (d)-limonene flavor; Bactrim (sulfamethoxazole-trimethoprim); Butorphanol tartrate; Darvocet a500 (propoxyphene n-acetaminophen); Fentanyl; Phenytoin sodium extended; White petrolatum-zinc; Zinc oxide-white petrolatum; and Latex, natural rubber    Medications:  Current Outpatient Prescriptions:   alprazolam (XANAX) 0.25 MG tablet, Take 1 tablet (0.25 mg total) by mouth once daily., Disp: 14 tablet, Rfl: 0  aspirin (ECOTRIN) 81 MG EC tablet, Take 1 tablet (81 mg total) by mouth once daily., Disp: , Rfl: 0  atorvastatin (LIPITOR) 80 MG tablet, Take 1 tablet (80 mg total) by mouth once daily. (Patient taking differently: Take 80 mg by mouth every evening. ), Disp: 90 tablet, Rfl: 3  buPROPion (WELLBUTRIN XL) 150 MG TB24 tablet, Take 2 tablets (300 mg total) by mouth once daily., Disp: 60 tablet, Rfl: 2  BUTALBITAL/ASPIRIN/CAFFEINE (FIORINAL ORAL), Take by mouth as needed., Disp: , Rfl:   clopidogrel (PLAVIX) 75 mg tablet, Take 1 tablet (75 mg total) by mouth once daily., Disp: 30 tablet, Rfl: 6  docusate sodium (COLACE) 100 MG capsule, Take 1 capsule (100 mg total) by mouth 3 (three) times daily as needed for Constipation., Disp: 90 capsule, Rfl: 3  fluoxetine (PROZAC) 20 MG capsule, Take 2 capsules (40 mg total) by mouth once daily., Disp: 60 capsule, Rfl: 2  furosemide (LASIX) 40 MG tablet, Take 1 tablet (40 mg total) by mouth 2 (two) times daily. (Patient taking differently: Take 60 mg by mouth once daily. Patient states she is taking 1.5 daily), Disp: 180 tablet, Rfl: 3  hydrocodone-acetaminophen 10-325mg (NORCO)  mg Tab, Take 1 tablet by mouth every 6 (six) hours as needed., Disp: 61 tablet,  Rfl: 0  hyoscyamine (ANASPAZ,LEVSIN) 0.125 mg Tab, TAKE 1 TABLET (125 MCG TOTAL) BY MOUTH EVERY 6 (SIX) HOURS AS NEEDED., Disp: 120 tablet, Rfl: 0  levothyroxine (SYNTHROID) 100 MCG tablet, Take 1 tablet (100 mcg total) by mouth before breakfast., Disp: 90 tablet, Rfl: 1  lisinopril (PRINIVIL,ZESTRIL) 2.5 MG tablet, Take 1 tablet (2.5 mg total) by mouth once daily., Disp: 90 tablet, Rfl: 3  ondansetron (ZOFRAN) 8 MG tablet, Take 1 tablet (8 mg total) by mouth every 12 (twelve) hours as needed for Nausea., Disp: 60 tablet, Rfl: 3  pantoprazole (PROTONIX) 40 MG tablet, Take 1 tablet (40 mg total) by mouth once daily., Disp: 90 tablet, Rfl: 3  polyethylene glycol (GLYCOLAX) 17 gram PwPk, Take 17 g by mouth 2 (two) times daily., Disp: 30 packet, Rfl: 5  potassium chloride SA (K-DUR,KLOR-CON) 20 MEQ tablet, Take 1 tablet (20 mEq total) by mouth 2 (two) times daily. (Patient taking differently: Take 20 mEq by mouth once daily. Patient states only taking once daily), Disp: 180 tablet, Rfl: 3  ranitidine (ZANTAC) 150 MG capsule, Take 150 mg by mouth daily as needed. , Disp: , Rfl:   SENNOSIDES (SENNA LAX ORAL), Take by mouth as needed., Disp: , Rfl:   trazodone (DESYREL) 50 MG tablet, TAKE 3 TABLETS BY MOUTH EVERY NIGHT AS NEEDED FOR INSOMNIA, Disp: 90 tablet, Rfl: 1  (DISCONTINUED) lamotrigine (LAMICTAL) 25 MG tablet, Take 1 tablet (25 mg total) by mouth once daily., Disp: 30 tablet, Rfl: 6        Review of Systems   Constitutional: Negative.  Negative for activity change, appetite change, chills and fever.   HENT: Negative for congestion, facial swelling, mouth sores, rhinorrhea, sore throat and trouble swallowing.    Eyes: Negative for photophobia, discharge and visual disturbance.   Respiratory: Negative for apnea, cough, chest tightness and wheezing.    Cardiovascular: Positive for leg swelling. Negative for chest pain and palpitations.   Gastrointestinal: Negative for abdominal pain, anal bleeding,  constipation, diarrhea, nausea and vomiting.   Genitourinary: Positive for difficulty urinating and hematuria. Negative for decreased urine volume.        Occasional see blood in urine     Musculoskeletal: Positive for gait problem. Negative for back pain, neck pain and neck stiffness.   Skin: Negative.  Negative for rash.   Neurological: Negative for dizziness, seizures, speech difficulty, weakness and headaches.   Psychiatric/Behavioral: Negative for agitation, confusion, self-injury, sleep disturbance and suicidal ideas. The patient is not nervous/anxious.        Objective:      Physical Exam   Nursing note and vitals reviewed.  Constitutional: She is oriented to person, place, and time. She appears well-developed and well-nourished.   HENT:   Head: Normocephalic.   Right Ear: External ear normal.   Left Ear: External ear normal.   Nose: Nose normal.   Eyes: Conjunctivae and lids are normal. Right eye exhibits no discharge. Left eye exhibits no discharge. No scleral icterus.   Neck: Trachea normal and normal range of motion. Neck supple. No tracheal deviation present.   Cardiovascular: Normal rate, regular rhythm and intact distal pulses.    Pulmonary/Chest: Effort normal. No respiratory distress.   Abdominal: Soft. Bowel sounds are normal. She exhibits no distension and no mass. There is no tenderness. There is no rebound and no guarding.       Musculoskeletal: Normal range of motion. She exhibits edema.   Neurological: She is alert and oriented to person, place, and time.   Skin: Skin is warm, dry and intact.     Psychiatric: She has a normal mood and affect. Her behavior is normal. Judgment and thought content normal.       Assessment:       1. Neurogenic bladder    2. Encounter for suprapubic catheter care        Plan:         I spent 20 minutes with the patient of which more than half was spent in direct consultation with the patient in regards to our treatment and plan.    Education and recommendations of  today's plan of care including home remedies.  Discussed daily skin care.  New 20fr Silicone SPT easily exchanged out using sterile technique.  I irrigated the bladder to verify the position & remove any debris.  I inflated the balloon with 8cc sterile water (I removed ~7-8cc).  She tolerated well.  RTC one month

## 2017-02-23 NOTE — MR AVS SNAPSHOT
[facility]  [address]  [facility] [address]  Phone: [phone]  Fax: [phone]                  Tasha Hawley   2017 9:40 AM   Office Visit    Description:  Female : 1955   Provider:  Mesfin Hodges II, MD   Department:  [facility]           Reason for Visit     Anxiety     Hypothyroidism           Diagnoses this Visit        Comments    Insomnia, unspecified type    -  Primary     Bilateral leg edema         Chronic bilateral low back pain without sciatica         Lumbar radiculopathy         Primary hypothyroidism         Fall, subsequent encounter         Drug-induced constipation         Scoliosis, unspecified scoliosis type, unspecified spinal region                To Do List           Future Appointments        Provider Department Dept Phone    3/17/2017 10:00 AM Shireen Mayo MD [facility] 4th Floor [phone]      Goals (5 Years of Data)     None      Follow-Up and Disposition     Return in about 4 weeks (around 3/23/2017).      [facility] On Call     [facility] On Call Nurse [facility] Line -  Assistance  Registered nurses in the [facility] On Call Center provide clinical advisement, health education, appointment booking, and other advisory services.  Call for this free service at [phone].             Medications           Message regarding Medications     Verify the changes and/or additions to your medication regime listed below are the same as discussed with your clinician today.  If any of these changes or additions are incorrect, please notify your healthcare provider.        STOP taking these medications     lisinopril (PRINIVIL,ZESTRIL) 2.5 MG tablet Take 1 tablet (2.5 mg total) by mouth once daily.    trazodone (DESYREL) 50 MG tablet TAKE 3 TABLETS BY MOUTH EVERY NIGHT AS NEEDED FOR INSOMNIA           Verify that the below list of medications is an accurate representation of the medications you are currently  taking.  If none reported, the list may be blank. If incorrect, please contact your healthcare provider. Carry this list with you in case of emergency.           Current Medications     alprazolam (XANAX) 0.25 MG tablet Take 1 tablet (0.25 mg total) by mouth once daily.    aspirin (ECOTRIN) 81 MG EC tablet Take 1 tablet (81 mg total) by mouth once daily.    atorvastatin (LIPITOR) 80 MG tablet Take 1 tablet (80 mg total) by mouth once daily.    buPROPion (WELLBUTRIN XL) 150 MG TB24 tablet Take 2 tablets (300 mg total) by mouth once daily.    BUTALBITAL/ASPIRIN/CAFFEINE (FIORINAL ORAL) Take by mouth as needed.    clopidogrel (PLAVIX) 75 mg tablet Take 1 tablet (75 mg total) by mouth once daily.    docusate sodium (COLACE) 100 MG capsule Take 1 capsule (100 mg total) by mouth 3 (three) times daily as needed for Constipation.    fluoxetine (PROZAC) 20 MG capsule Take 2 capsules (40 mg total) by mouth once daily.    furosemide (LASIX) 40 MG tablet Take 1 tablet (40 mg total) by mouth 2 (two) times daily.    hydrocodone-acetaminophen 10-325mg (NORCO)  mg Tab Take 1 tablet by mouth every 6 (six) hours as needed.    hyoscyamine (ANASPAZ,LEVSIN) 0.125 mg Tab TAKE 1 TABLET (125 MCG TOTAL) BY MOUTH EVERY 6 (SIX) HOURS AS NEEDED.    levothyroxine (SYNTHROID) 100 MCG tablet Take 1 tablet (100 mcg total) by mouth before breakfast.    ondansetron (ZOFRAN) 8 MG tablet Take 1 tablet (8 mg total) by mouth every 12 (twelve) hours as needed for Nausea.    pantoprazole (PROTONIX) 40 MG tablet Take 1 tablet (40 mg total) by mouth once daily.    polyethylene glycol (GLYCOLAX) 17 gram PwPk Take 17 g by mouth 2 (two) times daily.    potassium chloride SA (K-DUR,KLOR-CON) 20 MEQ tablet Take 1 tablet (20 mEq total) by mouth 2 (two) times daily.    ranitidine (ZANTAC) 150 MG capsule Take 150 mg by mouth daily as needed.     SENNOSIDES (SENNA LAX ORAL) Take by mouth as needed.           Clinical Reference Information           Your Vitals  "Were     BP Pulse Temp Height Weight Last Period    100/56 (BP Location: Left arm, Patient Position: Sitting, BP Method: Manual) 96 98.1 °F (36.7 °C) 5' 4" (1.626 m) 70.8 kg (156 lb) (Within Years)    BMI                26.78 kg/m2          Blood Pressure          Most Recent Value    BP  (!)  100/56      Allergies as of 2/23/2017     Imitrex [Sumatriptan Succinate]    Penicillins    Percocet [Oxycodone-acetaminophen]    Topamax [Topiramate]    Vancomycin    (D)-limonene Flavor    Bactrim [Sulfamethoxazole-trimethoprim]    Butorphanol Tartrate    Darvocet A500 [Propoxyphene N-acetaminophen]    Fentanyl    Phenytoin Sodium Extended    White Petrolatum-zinc    Zinc Oxide-white Petrolatum    Latex, Natural Rubber      Immunizations Administered on Date of Encounter - 2/23/2017     None      Orders Placed During Today's Visit      Normal Orders This Visit    Ambulatory consult to Neurosurgery       Language Assistance Services     ATTENTION: Language assistance services are available, free of charge. Please call [phone].      ATENCIÓN: Si habla roroañol, tiene a morgan disposición servicios gratuitos de asistencia lingüística. Llame al [phone].     VERONICA Ý: N?u b?n nói Ti?ng Vi?t, có các d?ch v? h? tr? ngôn ng? mi?n phí dành cho b?n. G?i s? [phone].         [facility] complies with applicable Federal civil rights laws and does not discriminate on the basis of race, color, national origin, age, disability, or sex.        "

## 2017-02-23 NOTE — MR AVS SNAPSHOT
[facility]  [address]  [facility] [address]  Phone: [phone]                  Tasha Hawley   2017 11:00 AM   Office Visit    Description:  Female : 1955   Provider:  Elida Batista NP   Department:  [facility]           Reason for Visit     Follow-up           Diagnoses this Visit        Comments    Neurogenic bladder    -  Primary     Encounter for suprapubic catheter care         Granulation tissue                To Do List           Future Appointments        Provider Department Dept Phone    2017 11:00 AM SHONDA Howe [facility] [phone]    3/17/2017 10:00 AM Shireen Mayo MD [facility] 4th Floor [phone]      Goals (5 Years of Data)     None      Follow-Up and Disposition     Return in about 4 weeks (around 3/23/2017).      [facility] On Call     [facility] On Call Nurse Care Line -  Assistance  Registered nurses in the [facility] provide clinical advisement, health education, appointment booking, and other advisory services.  Call for this free service at [phone].             Medications           Message regarding Medications     Verify the changes and/or additions to your medication regime listed below are the same as discussed with your clinician today.  If any of these changes or additions are incorrect, please notify your healthcare provider.             Verify that the below list of medications is an accurate representation of the medications you are currently taking.  If none reported, the list may be blank. If incorrect, please contact your healthcare provider. Carry this list with you in case of emergency.           Current Medications     alprazolam (XANAX) 0.25 MG tablet Take 1 tablet (0.25 mg total) by mouth once daily.    aspirin (ECOTRIN) 81 MG EC tablet Take 1 tablet (81 mg total) by mouth once daily.    atorvastatin (LIPITOR) 80 MG tablet Take 1 tablet (80  mg total) by mouth once daily.    buPROPion (WELLBUTRIN XL) 150 MG TB24 tablet Take 2 tablets (300 mg total) by mouth once daily.    BUTALBITAL/ASPIRIN/CAFFEINE (FIORINAL ORAL) Take by mouth as needed.    clopidogrel (PLAVIX) 75 mg tablet Take 1 tablet (75 mg total) by mouth once daily.    docusate sodium (COLACE) 100 MG capsule Take 1 capsule (100 mg total) by mouth 3 (three) times daily as needed for Constipation.    fluoxetine (PROZAC) 20 MG capsule Take 2 capsules (40 mg total) by mouth once daily.    furosemide (LASIX) 40 MG tablet Take 1 tablet (40 mg total) by mouth 2 (two) times daily.    hydrocodone-acetaminophen 10-325mg (NORCO)  mg Tab Take 1 tablet by mouth every 6 (six) hours as needed.    hyoscyamine (ANASPAZ,LEVSIN) 0.125 mg Tab TAKE 1 TABLET (125 MCG TOTAL) BY MOUTH EVERY 6 (SIX) HOURS AS NEEDED.    levothyroxine (SYNTHROID) 100 MCG tablet Take 1 tablet (100 mcg total) by mouth before breakfast.    lisinopril (PRINIVIL,ZESTRIL) 2.5 MG tablet Take 1 tablet (2.5 mg total) by mouth once daily.    ondansetron (ZOFRAN) 8 MG tablet Take 1 tablet (8 mg total) by mouth every 12 (twelve) hours as needed for Nausea.    pantoprazole (PROTONIX) 40 MG tablet Take 1 tablet (40 mg total) by mouth once daily.    polyethylene glycol (GLYCOLAX) 17 gram PwPk Take 17 g by mouth 2 (two) times daily.    potassium chloride SA (K-DUR,KLOR-CON) 20 MEQ tablet Take 1 tablet (20 mEq total) by mouth 2 (two) times daily.    ranitidine (ZANTAC) 150 MG capsule Take 150 mg by mouth daily as needed.     SENNOSIDES (SENNA LAX ORAL) Take by mouth as needed.    trazodone (DESYREL) 50 MG tablet TAKE 3 TABLETS BY MOUTH EVERY NIGHT AS NEEDED FOR INSOMNIA           Clinical Reference Information           Your Vitals Were     BP Pulse Last Period             92/48 61 (Within Years)         Blood Pressure          Most Recent Value    BP  (!)  92/48      Allergies as of 2/23/2017     Imitrex [Sumatriptan Succinate]    Penicillins     Percocet [Oxycodone-acetaminophen]    Topamax [Topiramate]    Vancomycin    (D)-limonene Flavor    Bactrim [Sulfamethoxazole-trimethoprim]    Butorphanol Tartrate    Darvocet A500 [Propoxyphene N-acetaminophen]    Fentanyl    Phenytoin Sodium Extended    White Petrolatum-zinc    Zinc Oxide-white Petrolatum    Latex, Natural Rubber      Immunizations Administered on Date of Encounter - 2/23/2017     None      Language Assistance Services     ATTENTION: Language assistance services are available, free of charge. Please call [phone].      ATENCIÓN: Si habla español, tiene a morgan disposición servicios gratuitos de asistencia lingüística. Llame al [phone].     CHÚ Ý: N?u b?n nói Ti?ng Vi?t, có các d?ch v? h? tr? ngôn ng? mi?n phí dành cho b?n. G?i s? [phone].         [facility] complies with applicable Federal civil rights laws and does not discriminate on the basis of race, color, national origin, age, disability, or sex.

## 2017-02-24 NOTE — PROGRESS NOTES
Subjective:       Patient ID: Tasha Hawley is a 61 y.o. female.    Chief Complaint: Anxiety and Hypothyroidism    HPI - Ms Hawley had a host of complaints today.  She was admitted for 3 days in early Feb for a fall and confusion, both of which are chronic complaints.  She's on a long list of medications, many of which interfere with her ability to concentrate and cause somnolence.  Meds were adjusted during hospitalization, but she's still working off of an old, unchanged medication list.    The complaints we addressed today were LE edema with skin breakdown on her feet.  She's seeing a podiatrist for foot deformity, probably Charcot foot and he has been treating the skin breakdown.  He requested I call the office, which I will do today.      She has chronic back pain, along with other sources of pain.  Marked kyphoscoliosis, and she has pain where lower right ribcage protrudes. She sees pain management, but feels that isn't helping.  Wonders what other options she has. Her TSH was suppressed during hospitalization and synthroid dose was adjusted.      Marked constipation, requiring four medications for treatment.  Likely due to immobility, chronic pain and narcotic use.    PMH:  Chronic low back pain with narcotic dependence followed by a pain clinic  History CVA with dysarthria  Neurogenic bladder, with recurrent UTI's.  SP catheter placed Nov 2016  Hypokalemia  Hypothyroidism  CECI, not on CPAP at the moment  CAD, with ACS in Jan 2016; ischemic CM with EF 40% and chronic LE edema. Followed by [facility]  Anxiety and Depression  Chronic constipation, likely d/t heavy narcotic use  Intermittent nausea     Meds: Long, involved list, but she carries a typed list. Reviewed and reconciled in EPIC with patient during visit today    She has never smoked, is not sexually active and does not use illicit substances.  No alcohol use.  No change in family history    Review of Systems   Constitutional:  Negative for fever.   HENT: Negative for congestion.    Respiratory: Negative for shortness of breath.    Cardiovascular: Positive for leg swelling.   Gastrointestinal: Negative for abdominal pain.   Genitourinary: Negative for difficulty urinating.   Musculoskeletal: Positive for arthralgias and gait problem.   Skin: Positive for rash.   Neurological: Negative for headaches.   Psychiatric/Behavioral: Positive for sleep disturbance.       Objective:      Physical Exam   Constitutional: She appears well-developed and well-nourished.   Chronically ill appearing WF examined in wheelchair d/t mobility difficulty   HENT:   Head: Normocephalic and atraumatic.   Cardiovascular: Normal rate, regular rhythm and normal heart sounds.  Exam reveals no gallop and no friction rub.    No murmur heard.  Pulses:       Dorsalis pedis pulses are 2+ on the right side, and 2+ on the left side.   Pulmonary/Chest: Effort normal and breath sounds normal. No respiratory distress. She has no wheezes. She has no rales. She exhibits no tenderness.   Musculoskeletal: She exhibits edema.        Right foot: There is deformity.        Left foot: There is deformity.   Bilateral LE's with 4+ edema to knee.  Chronic venous stasis changes with erythema. See foot exam   Feet:   Right Foot:   Skin Integrity: Positive for erythema and warmth. Negative for ulcer, blister or skin breakdown.   Left Foot:   Skin Integrity: Positive for skin breakdown, erythema and warmth. Negative for ulcer or blister.   Neurological: She is alert. Coordination abnormal.   Skin: Skin is warm and dry. No erythema.   Psychiatric: She has a normal mood and affect.   Nursing note and vitals reviewed.      Assessment:       1. Insomnia, unspecified type    2. Bilateral leg edema    3. Chronic bilateral low back pain without sciatica    4. Lumbar radiculopathy    5. Primary hypothyroidism    6. Fall, subsequent encounter    7. Drug-induced constipation    8. Scoliosis, unspecified  scoliosis type, unspecified spinal region        Plan:       Tasha was seen today for anxiety and hypothyroidism.    Diagnoses and all orders for this visit:    Insomnia, unspecified type - Poor sleep habits are contributing here.  Encouraged consistent bedtime of midnight (not 3am).  Taper trazodone to off d/t confusion; just use the benzo at bedtime.  Will continue to try to adjust medications to reduce somnolence, but better sleep hygiene is an essential first step    Bilateral leg edema - unstable, severe exacerbation.  She states this wasn't present during hospitalization and is worsening.  She has skin breakdown, too.  Will partner with podiatry and maybe wound management to fix this    Chronic bilateral low back pain without sciatica - unstable, worsening.  Will ask neurosurgery to assist  -     Ambulatory consult to Neurosurgery    Lumbar radiculopathy  -     Ambulatory consult to Neurosurgery    Primary hypothyroidism - medications adjusted    Fall, subsequent encounter - use walker and wheelchair to help with this    Drug-induced constipation    Scoliosis, unspecified scoliosis type, unspecified spinal region  -     Ambulatory consult to Neurosurgery    rtc 1 month to address further concerns including polypharmacy.  Spent 30 minutes with patient, including 20 minutes counseling about chronic conditions and medical treatment.    PAPO Hodges MD MPH  Staff Internist

## 2017-02-25 NOTE — PT/OT/SLP DISCHARGE
Occupational Therapy Discharge Summary    Tasha Hawley  MRN: [id number]   Acute cystitis with hematuria   Patient Discharged from acute Occupational Therapy on 2/5/17.  Please refer to prior OT note dated on 2/2/17 for functional status.     Assessment:   Patient appropriate for care in another setting.  GOALS:   Occupational Therapy Goals        Problem: Occupational Therapy Goal    Goal Priority Disciplines Outcome Interventions   Occupational Therapy Goal     OT, PT/OT Ongoing (interventions implemented as appropriate)    Description:  Goals to be met by: 2/12/17     Patient will increase functional independence with ADLs by performing:    LE Dressing with Supervision and Assistive Devices as needed.  Grooming while standing at sink with Supervision.  Toileting from toilet with Modified Davidson for hygiene and clothing management.   Supine to sit with Modified Davidson.  Stand pivot transfers with Supervision.  Toilet transfer to toilet with Supervision.              Reasons for Discontinuation of Therapy Services  Transfer to alternate level of care.      Plan:  Patient Discharged to: Home no PT services needed.    JEREMY Guerrero  2/25/2017  Pager: [phone]

## 2017-02-27 ENCOUNTER — TELEPHONE (OUTPATIENT)
Dept: INTERNAL MEDICINE | Facility: CLINIC | Age: 62
End: 2017-02-27

## 2017-02-27 NOTE — TELEPHONE ENCOUNTER
Spoke to patient she states she had a fall yesterday and now has a big knot on her forehead. She c/o a small headache but no dizzy ness. HH nurse stated she seemed to be ok. Advised patient per Dr. Hodges that she should have it checked out. Patient refused and stated that she was fine and would go if she felt week or dizzy.

## 2017-02-27 NOTE — TELEPHONE ENCOUNTER
----- Message from Angélica Bush MA sent at 2/27/2017 11:02 AM CST -----  Contact: Jasson mike/[facility]-[phone]  Jasson stated that during the Home Health visit the Pt notified her of a fall she had this Morning and she has a Hematoma to the Forehead and arm. Please advise. Thanks!

## 2017-03-01 ENCOUNTER — OFFICE VISIT (OUTPATIENT)
Dept: NEUROSURGERY | Facility: CLINIC | Age: 62
End: 2017-03-01
Payer: MEDICARE

## 2017-03-01 ENCOUNTER — HOSPITAL ENCOUNTER (OUTPATIENT)
Dept: RADIOLOGY | Facility: HOSPITAL | Age: 62
Discharge: HOME | End: 2017-03-01
Attending: NEUROLOGICAL SURGERY
Payer: MEDICARE

## 2017-03-01 VITALS
SYSTOLIC BLOOD PRESSURE: 99 MMHG | DIASTOLIC BLOOD PRESSURE: 41 MMHG | BODY MASS INDEX: 26.63 KG/M2 | TEMPERATURE: 98 F | HEIGHT: 64 IN | HEART RATE: 51 BPM | WEIGHT: 156 LBS

## 2017-03-01 DIAGNOSIS — G89.29 CHRONIC BILATERAL LOW BACK PAIN WITH BILATERAL SCIATICA: ICD-10-CM

## 2017-03-01 DIAGNOSIS — S09.93XA FACIAL TRAUMA, INITIAL ENCOUNTER: ICD-10-CM

## 2017-03-01 DIAGNOSIS — M54.2 CERVICAL PAIN (NECK): ICD-10-CM

## 2017-03-01 DIAGNOSIS — M54.41 CHRONIC BILATERAL LOW BACK PAIN WITH BILATERAL SCIATICA: ICD-10-CM

## 2017-03-01 DIAGNOSIS — M54.42 CHRONIC BILATERAL LOW BACK PAIN WITH BILATERAL SCIATICA: ICD-10-CM

## 2017-03-01 DIAGNOSIS — S09.93XD FACIAL TRAUMA, SUBSEQUENT ENCOUNTER: Primary | ICD-10-CM

## 2017-03-01 PROCEDURE — 1160F RVW MEDS BY RX/DR IN RCRD: CPT | Mod: S$GLB,,, | Performed by: PHYSICIAN ASSISTANT

## 2017-03-01 PROCEDURE — 70450 CT HEAD/BRAIN W/O DYE: CPT | Mod: TC

## 2017-03-01 PROCEDURE — 99205 OFFICE O/P NEW HI 60 MIN: CPT | Mod: S$GLB,,, | Performed by: PHYSICIAN ASSISTANT

## 2017-03-01 PROCEDURE — 99499 UNLISTED E&M SERVICE: CPT | Mod: S$GLB,,, | Performed by: PHYSICIAN ASSISTANT

## 2017-03-01 PROCEDURE — 70450 CT HEAD/BRAIN W/O DYE: CPT | Mod: 26,,, | Performed by: RADIOLOGY

## 2017-03-01 PROCEDURE — 99999 PR PBB SHADOW E&M-EST. PATIENT-LVL V: CPT | Mod: PBBFAC,,, | Performed by: PHYSICIAN ASSISTANT

## 2017-03-01 RX ORDER — DOXYCYCLINE 100 MG/1
CAPSULE ORAL
COMMUNITY
Start: 2017-02-25 | End: 2017-05-18

## 2017-03-01 NOTE — PROGRESS NOTES
[facility]  Neurosurgery    SUBJECTIVE:     History of Present Illness:  Patient is a 61 y.o. female with PMHx chronic pain with dilaudid pain pump with oral hydrocodone, s/p suprapubic catheter, GERD, hx CVA, possible MI, on ASA/Plavix, anxiety, RA, HLD, hypothyroidism who presents for evaluation of neck & back pain.  She has an extensive surgical history, with multiple lumbar fusions & revisions by multiple surgeons, the first being in 1997 after a lumbar fracture.  She also had a SCS placed by Dr. Hillman, but approximately 10 years ago, the batter pack was removed, but the leads were left in place.  She is s/p C4-7 ACDF on 5/13/13 by Dr. Martinez.  She currently has a dialudid pain pump, which was placed approximately 5 years ago by Dr. Hillman.  She is a very poor historian & cannot give the surgeons, dates, or details of procedures done.  Most recently, she reports a fall yesterday in which she lost balance and fell on her face, hitting the tile floor.  She reports years of falling, but over the past year, the falls has become worse.  She feels her legs are giving out and she feels weak. She endorses dropping items & trouble buttoning shirts.  She denies bowel incontinence.  She has a suprapubic catheter.  She endorses BLE paresthesias throughout the entire legs & bilateral hand paresthesias.  She does not have UE pain.  She has BLE pain posterolateral into feet, but nonspecific where in the feet.        Review of patient's allergies indicates:   Allergen Reactions    Imitrex [sumatriptan succinate] Shortness Of Breath    Penicillins Shortness Of Breath     Other reaction(s): Jittery    Percocet [oxycodone-acetaminophen] Anaphylaxis    Topamax [topiramate] Shortness Of Breath    Vancomycin Shortness Of Breath     Rash    (d)-limonene flavor      Other reaction(s): difficult intubation  Other reaction(s): Jittery  Other reaction(s): Difficulty breathing  Other reaction(s): Difficulty  breathing  Other reaction(s): Jittery  Other reaction(s): Difficulty breathing    Bactrim [sulfamethoxazole-trimethoprim] Nausea And Vomiting     Other reaction(s): Resp Depression  Other reaction(s): Anaphylaxis    Butorphanol tartrate     Darvocet a500 [propoxyphene n-acetaminophen]      Other reaction(s): Jittery  Other reaction(s): Difficulty breathing    Fentanyl      Other reaction(s): Vomiting  Other reaction(s): Nausea  Other reaction(s): Itching  swelling    Phenytoin sodium extended      pATIENT DENIES EVER HAVING THIS MEDICATION    White petrolatum-zinc     Zinc oxide-white petrolatum      Other reaction(s): Difficulty breathing    Latex, natural rubber Itching and Rash       Past Medical History:   Diagnosis Date    Allergy     Anxiety     Arthritis     Carotid artery occlusion     Cataract     Depression     Edema     chronic    Fever blister     Hypothyroid     Iron deficiency anemia     Joint pain     Lumbar radiculopathy     with chronic pain    Ocular migraine     Sleep apnea     cpap     Past Surgical History:   Procedure Laterality Date    BACK SURGERY      x 12    CATARACT EXTRACTION W/  INTRAOCULAR LENS IMPLANT Left     Dr Coleman     HYSTERECTOMY  1975    endometriosis    pain pump placement      SPINE SURGERY  5-13-13    CERVICAL FUSION     Family History   Problem Relation Age of Onset    Cancer Mother 55     breast    Cancer Father      esophagus,had laryngectomy    Parkinsonism Maternal Grandmother     Tremor Maternal Grandmother     No Known Problems Brother     No Known Problems Brother     Heart disease Maternal Uncle     No Known Problems Sister     No Known Problems Maternal Aunt     No Known Problems Paternal Aunt     No Known Problems Paternal Uncle     No Known Problems Maternal Grandfather     No Known Problems Paternal Grandmother     No Known Problems Paternal Grandfather     Melanoma Neg Hx     Amblyopia Neg Hx     Blindness Neg Hx      Cataracts Neg Hx     Diabetes Neg Hx     Glaucoma Neg Hx     Hypertension Neg Hx     Macular degeneration Neg Hx     Retinal detachment Neg Hx     Strabismus Neg Hx     Stroke Neg Hx     Thyroid disease Neg Hx      Social History   Substance Use Topics    Smoking status: Never Smoker    Smokeless tobacco: Never Used    Alcohol use No      Comment: denies        Review of Systems:  Constitutional: no fever or chills  Eyes: no visual changes  ENT: no nasal congestion or sore throat  Respiratory: no cough or shortness of breath  Cardiovascular: no chest pain or palpitations  Gastrointestinal: no nausea or vomiting, tolerating diet  Genitourinary: no hematuria or dysuria  Integument/Breast: no rash or pruritis  Hematologic/Lymphatic: no easy bruising or lymphadenopathy  Musculoskeletal: positive for neck, back, UE, LE pain  Neurological: no seizures or tremors  Behavioral/Psych: no auditory or visual hallucinations  Endocrine: no heat or cold intolerance    OBJECTIVE:     Vital Signs (Most Recent):  Temp: 97.7 °F (36.5 °C) (03/01/17 1603)  Pulse: (!) 51 (03/01/17 1603)  BP: (!) 99/41 (03/01/17 1603)    Physical Exam:  General: well developed, well nourished, no distress  Head: normocephalic, facial ecchymosis, frontal right hematoma  Neurologic: Alert and oriented. Thought content appropriate  GCS: Motor: 6/Verbal: 5/Eyes: 4 GCS Total: 15  Mental Status: Awake, Lethargic, Oriented x 4  Language: No aphasia  Speech: Mild dysarthria  Cranial nerves: face symmetric, tongue midline, CN II-XII grossly intact.   R ear deaf, left ear decreased hearing (congenital)  Eyes: pupils equal, round, reactive to light with accommodation, EOMI. Unable to appreciate optic disc. Red reflex intact bilaterally.   Pulmonary: normal respirations, not labored, no accessory muscles used  Abdomen: soft, non-distended, not tender to palpation  Sensory: intact to light touch throughout  Motor Strength: Moves all extremities  spontaneously with good tone.  Full strength upper and lower extremities. No abnormal movements seen.     Strength  Deltoids Triceps Biceps Wrist Extension Wrist Flexion Hand    Upper: R 5/5 5/5 5/5 5/5 5/5 5/5    L 4-/5 5/5 5/5 5/5 5/5 5/5     Iliopsoas Quadriceps Knee  Flexion Tibialis  anterior Gastro- cnemius EHL   Lower: R 4/5 4+/5 4+/5 5/5 5/5 5/5    L 4/5 4/5 4/5 5/5 5/5 5/5   Difficult to assess tibialis anterior, gastrocnemius, & EHL due to boots & BLE lymphedema  DTR's - 2 + and symmetric triceps, biceps, brachioradialis, Unable to assess patellar & achilles  Pronator Drift: no drift noted  Finger-to-nose: Intact bilaterally  Underwood: present bilat  Clonus: absent but very hard to assess due to lymphedema  Babinski: absent  Pulses: 2+ and symmetric radial and dorsalis pedis  Skin: warm, dry and intact, no rashes  Straight leg raise: negative  Gait: normal  Tandem Gait: No difficulty         Able to walk on heels & toes  Cervical ROM: full  Lumbar ROM: full   SI Joint tenderness: Negative   Pain on Hip ROM: Negative  Skin: intact, no visible rashes or lesions  BLE lymphedema  Large incision midline back with palpable hardware    Diagnostic Results:  No available imaging    ASSESSMENT/PLAN:     Ms. Hawley is a 61yoF with multiple lumbar surgeries & revisions, SCS placed & then batter pack removed, s/p C4-7 ACDF on 5/13/13 by Dr. Martinez, and currently with dilaudid pain pump  -Due to ASA/Plavix & fall on face, pt is to have STAT CT Head today  -Lumbar & Cspine xrays at a later date  -Will likely need cervical & lumbar myelogram at some point to assess for stenosis as etiology for falls  -Will let patient know CT results    Please feel free to call with any further questions    Raegan Gifford PA-C  Neurosurgery  [phone]

## 2017-03-01 NOTE — LETTER
March 3, 2017      Mesfin Hodges II, MD  [address]  [facility] [address]           [facility] - Neurosurgery 7th Fl  [address]  [facility] [address]  Phone: [phone]          Patient: Tasha Hawley   MR Number: [id number]   YOB: 1955   Date of Visit: 3/1/2017       Dear Dr. Mesfin Hodges II:    Thank you for referring Tasha Hawley to me for evaluation. Attached you will find relevant portions of my assessment and plan of care.    If you have questions, please do not hesitate to call me. I look forward to following Tasha Hawley along with you.    Sincerely,    Raegan Gifford PA-C    Enclosure  CC:  No Recipients    If you would like to receive this communication electronically, please contact [email or web address] or [phone] to request more information on [employer] access.    For providers and/or their staff who would like to refer a patient to [facility], please contact us through our one-stop-shop provider referral line, [facility], at [phone].    If you feel you have received this communication in error or would no longer like to receive these types of communications, please e-mail [email or web address]

## 2017-03-02 ENCOUNTER — TELEPHONE (OUTPATIENT)
Dept: NEUROSURGERY | Facility: CLINIC | Age: 62
End: 2017-03-02

## 2017-03-02 ENCOUNTER — HOSPITAL ENCOUNTER (OUTPATIENT)
Facility: HOSPITAL | Age: 62
Discharge: HOME | End: 2017-03-03
Attending: EMERGENCY MEDICINE | Admitting: HOSPITALIST
Payer: MEDICARE

## 2017-03-02 DIAGNOSIS — R55 SYNCOPE AND COLLAPSE: ICD-10-CM

## 2017-03-02 DIAGNOSIS — S01.01XA SCALP LACERATION, INITIAL ENCOUNTER: ICD-10-CM

## 2017-03-02 DIAGNOSIS — M54.16 LUMBAR RADICULOPATHY: Primary | ICD-10-CM
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
## 2017-03-02 DIAGNOSIS — S09.90XA HEAD INJURY DUE TO TRAUMA, INITIAL ENCOUNTER: ICD-10-CM

## 2017-03-02 DIAGNOSIS — I51.89 COMBINED SYSTOLIC AND DIASTOLIC CARDIAC DYSFUNCTION: ICD-10-CM

## 2017-03-02 DIAGNOSIS — G89.4 CHRONIC PAIN SYNDROME: ICD-10-CM

## 2017-03-02 PROBLEM — I89.0 LYMPHEDEMA OF BOTH LOWER EXTREMITIES: Status: ACTIVE | Noted: 2017-03-02

## 2017-03-02 PROBLEM — I89.0 LYMPHEDEMA: Chronic | Status: ACTIVE | Noted: 2017-03-02

## 2017-03-02 LAB
ALBUMIN SERPL BCP-MCNC: 3.5 G/DL
ALP SERPL-CCNC: 108 U/L
ALT SERPL W/O P-5'-P-CCNC: 12 U/L
AMPHET+METHAMPHET UR QL: NEGATIVE
ANION GAP SERPL CALC-SCNC: 7 MMOL/L
AST SERPL-CCNC: 16 U/L
BARBITURATES UR QL SCN>200 NG/ML: NORMAL
BASOPHILS # BLD AUTO: 0.01 K/UL
BASOPHILS NFR BLD: 0.2 %
BENZODIAZ UR QL SCN>200 NG/ML: NEGATIVE
BILIRUB SERPL-MCNC: 0.4 MG/DL
BILIRUB UR QL STRIP: NEGATIVE
BUN SERPL-MCNC: 7 MG/DL
BZE UR QL SCN: NEGATIVE
CALCIUM SERPL-MCNC: 9.3 MG/DL
CANNABINOIDS UR QL SCN: NEGATIVE
CHLORIDE SERPL-SCNC: 103 MMOL/L
CK SERPL-CCNC: 75 U/L
CLARITY UR: CLEAR
CO2 SERPL-SCNC: 33 MMOL/L
COLOR UR: YELLOW
CREAT SERPL-MCNC: 0.8 MG/DL
CREAT UR-MCNC: 22.6 MG/DL
DIFFERENTIAL METHOD: ABNORMAL
EOSINOPHIL # BLD AUTO: 0.1 K/UL
EOSINOPHIL NFR BLD: 1.5 %
ERYTHROCYTE [DISTWIDTH] IN BLOOD BY AUTOMATED COUNT: 13.4 %
EST. GFR  (AFRICAN AMERICAN): >60 ML/MIN/1.73 M^2
EST. GFR  (NON AFRICAN AMERICAN): >60 ML/MIN/1.73 M^2
GLUCOSE SERPL-MCNC: 81 MG/DL
GLUCOSE UR QL STRIP: NEGATIVE
HCT VFR BLD AUTO: 32.9 %
HGB BLD-MCNC: 10.1 G/DL
HGB UR QL STRIP: NEGATIVE
KETONES UR QL STRIP: NEGATIVE
LEUKOCYTE ESTERASE UR QL STRIP: NEGATIVE
LYMPHOCYTES # BLD AUTO: 1.1 K/UL
LYMPHOCYTES NFR BLD: 22.5 %
MCH RBC QN AUTO: 27.2 PG
MCHC RBC AUTO-ENTMCNC: 30.7 %
MCV RBC AUTO: 88 FL
METHADONE UR QL SCN>300 NG/ML: NEGATIVE
MONOCYTES # BLD AUTO: 0.4 K/UL
MONOCYTES NFR BLD: 7.5 %
NEUTROPHILS # BLD AUTO: 3.3 K/UL
NEUTROPHILS NFR BLD: 68.1 %
NITRITE UR QL STRIP: NEGATIVE
OPIATES UR QL SCN: NORMAL
PCP UR QL SCN>25 NG/ML: NEGATIVE
PH UR STRIP: 8 [PH] (ref 5–8)
PLATELET # BLD AUTO: 218 K/UL
PMV BLD AUTO: 10.5 FL
POTASSIUM SERPL-SCNC: 3.7 MMOL/L
PROT SERPL-MCNC: 6.8 G/DL
PROT UR QL STRIP: NEGATIVE
RBC # BLD AUTO: 3.72 M/UL
SODIUM SERPL-SCNC: 143 MMOL/L
SP GR UR STRIP: 1.01 (ref 1–1.03)
TOXICOLOGY INFORMATION: NORMAL
TROPONIN I SERPL DL<=0.01 NG/ML-MCNC: 0.01 NG/ML
URN SPEC COLLECT METH UR: NORMAL
UROBILINOGEN UR STRIP-ACNC: NEGATIVE EU/DL
WBC # BLD AUTO: 4.79 K/UL

## 2017-03-02 PROCEDURE — 84484 ASSAY OF TROPONIN QUANT: CPT | Mod: 91

## 2017-03-02 PROCEDURE — 90471 IMMUNIZATION ADMIN: CPT | Performed by: EMERGENCY MEDICINE

## 2017-03-02 PROCEDURE — 84484 ASSAY OF TROPONIN QUANT: CPT

## 2017-03-02 PROCEDURE — 93005 ELECTROCARDIOGRAM TRACING: CPT

## 2017-03-02 PROCEDURE — G0378 HOSPITAL OBSERVATION PER HR: HCPCS

## 2017-03-02 PROCEDURE — 81003 URINALYSIS AUTO W/O SCOPE: CPT

## 2017-03-02 PROCEDURE — 85025 COMPLETE CBC W/AUTO DIFF WBC: CPT

## 2017-03-02 PROCEDURE — 63600175 PHARM REV CODE 636 W HCPCS: Performed by: EMERGENCY MEDICINE

## 2017-03-02 PROCEDURE — 25000003 PHARM REV CODE 250: Performed by: EMERGENCY MEDICINE

## 2017-03-02 PROCEDURE — 25000003 PHARM REV CODE 250: Performed by: HOSPITALIST

## 2017-03-02 PROCEDURE — 90715 TDAP VACCINE 7 YRS/> IM: CPT | Performed by: EMERGENCY MEDICINE

## 2017-03-02 PROCEDURE — 36415 COLL VENOUS BLD VENIPUNCTURE: CPT

## 2017-03-02 PROCEDURE — 96361 HYDRATE IV INFUSION ADD-ON: CPT

## 2017-03-02 PROCEDURE — 99285 EMERGENCY DEPT VISIT HI MDM: CPT

## 2017-03-02 PROCEDURE — 82550 ASSAY OF CK (CPK): CPT

## 2017-03-02 PROCEDURE — 87040 BLOOD CULTURE FOR BACTERIA: CPT

## 2017-03-02 PROCEDURE — 80053 COMPREHEN METABOLIC PANEL: CPT

## 2017-03-02 PROCEDURE — 87086 URINE CULTURE/COLONY COUNT: CPT

## 2017-03-02 PROCEDURE — 82570 ASSAY OF URINE CREATININE: CPT

## 2017-03-02 PROCEDURE — 96360 HYDRATION IV INFUSION INIT: CPT

## 2017-03-02 RX ORDER — ASPIRIN 81 MG/1
81 TABLET ORAL DAILY
Status: DISCONTINUED | OUTPATIENT
Start: 2017-03-03 | End: 2017-03-03 | Stop reason: HOSPADM

## 2017-03-02 RX ORDER — ENOXAPARIN SODIUM 100 MG/ML
40 INJECTION SUBCUTANEOUS EVERY 24 HOURS
Status: DISCONTINUED | OUTPATIENT
Start: 2017-03-03 | End: 2017-03-03 | Stop reason: HOSPADM

## 2017-03-02 RX ORDER — HYDROCODONE BITARTRATE AND ACETAMINOPHEN 10; 325 MG/1; MG/1
1 TABLET ORAL EVERY 6 HOURS PRN
Status: DISCONTINUED | OUTPATIENT
Start: 2017-03-02 | End: 2017-03-03 | Stop reason: HOSPADM

## 2017-03-02 RX ORDER — SODIUM CHLORIDE 9 MG/ML
1000 INJECTION, SOLUTION INTRAVENOUS
Status: COMPLETED | OUTPATIENT
Start: 2017-03-02 | End: 2017-03-02

## 2017-03-02 RX ORDER — FAMOTIDINE 20 MG/1
20 TABLET, FILM COATED ORAL 2 TIMES DAILY
Status: DISCONTINUED | OUTPATIENT
Start: 2017-03-02 | End: 2017-03-03 | Stop reason: HOSPADM

## 2017-03-02 RX ORDER — FLUOXETINE HYDROCHLORIDE 20 MG/1
40 CAPSULE ORAL DAILY
Status: DISCONTINUED | OUTPATIENT
Start: 2017-03-03 | End: 2017-03-03 | Stop reason: HOSPADM

## 2017-03-02 RX ORDER — LEVOTHYROXINE SODIUM 100 UG/1
100 TABLET ORAL
Status: DISCONTINUED | OUTPATIENT
Start: 2017-03-03 | End: 2017-03-03 | Stop reason: HOSPADM

## 2017-03-02 RX ORDER — ATORVASTATIN CALCIUM 40 MG/1
80 TABLET, FILM COATED ORAL NIGHTLY
Status: DISCONTINUED | OUTPATIENT
Start: 2017-03-02 | End: 2017-03-03 | Stop reason: HOSPADM

## 2017-03-02 RX ORDER — ONDANSETRON 8 MG/1
8 TABLET, ORALLY DISINTEGRATING ORAL EVERY 8 HOURS PRN
Status: DISCONTINUED | OUTPATIENT
Start: 2017-03-02 | End: 2017-03-03 | Stop reason: HOSPADM

## 2017-03-02 RX ORDER — BUPROPION HYDROCHLORIDE 150 MG/1
300 TABLET ORAL DAILY
Status: DISCONTINUED | OUTPATIENT
Start: 2017-03-03 | End: 2017-03-03 | Stop reason: HOSPADM

## 2017-03-02 RX ORDER — ACETAMINOPHEN 325 MG/1
650 TABLET ORAL EVERY 6 HOURS PRN
Status: DISCONTINUED | OUTPATIENT
Start: 2017-03-02 | End: 2017-03-03 | Stop reason: HOSPADM

## 2017-03-02 RX ORDER — PANTOPRAZOLE SODIUM 40 MG/1
40 TABLET, DELAYED RELEASE ORAL DAILY
Status: DISCONTINUED | OUTPATIENT
Start: 2017-03-03 | End: 2017-03-03 | Stop reason: HOSPADM

## 2017-03-02 RX ADMIN — HYDROCODONE BITARTRATE AND ACETAMINOPHEN 1 TABLET: 10; 325 TABLET ORAL at 08:03

## 2017-03-02 RX ADMIN — SODIUM CHLORIDE 1000 ML: 0.9 INJECTION, SOLUTION INTRAVENOUS at 05:03

## 2017-03-02 RX ADMIN — ACETAMINOPHEN 650 MG: 325 TABLET ORAL at 09:03

## 2017-03-02 RX ADMIN — CLOSTRIDIUM TETANI TOXOID ANTIGEN (FORMALDEHYDE INACTIVATED), CORYNEBACTERIUM DIPHTHERIAE TOXOID ANTIGEN (FORMALDEHYDE INACTIVATED), BORDETELLA PERTUSSIS TOXOID ANTIGEN (GLUTARALDEHYDE INACTIVATED), BORDETELLA PERTUSSIS FILAMENTOUS HEMAGGLUTININ ANTIGEN (FORMALDEHYDE INACTIVATED), BORDETELLA PERTUSSIS PERTACTIN ANTIGEN, AND BORDETELLA PERTUSSIS FIMBRIAE 2/3 ANTIGEN 0.5 ML: 5; 2; 2.5; 5; 3; 5 INJECTION, SUSPENSION INTRAMUSCULAR at 05:03

## 2017-03-02 RX ADMIN — FAMOTIDINE 20 MG: 20 TABLET, FILM COATED ORAL at 09:03

## 2017-03-02 RX ADMIN — TRAZODONE HYDROCHLORIDE 25 MG: 50 TABLET ORAL at 11:03

## 2017-03-02 NOTE — TELEPHONE ENCOUNTER
CT was reviewed and was normal. SW patient and relayed this info to her this AM. Future appts were discussed and appt slip is in the mail.

## 2017-03-02 NOTE — IP AVS SNAPSHOT
[facility]  [address]  [place] [address]  Phone: [phone]           Patient Discharge Instructions     Our goal is to set you up for success. This packet includes information on your condition, medications, and your home care. It will help you to care for yourself so you don't get sicker and need to go back to the hospital.     Please ask your nurse if you have any questions.        There are many details to remember when preparing to leave the hospital. Here is what you will need to do:    1. Take your medicine. If you are prescribed medications, review your Medication List in the following pages. You may have new medications to  at the pharmacy and others that you'll need to stop taking. Review the instructions for how and when to take your medications. Talk with your doctor or nurses if you are unsure of what to do.     2. Go to your follow-up appointments. Specific follow-up information is listed in the following pages. Your may be contacted by a transition nurse or clinical provider about future appointments. Be sure we have all of the phone numbers to reach you, if needed. Please contact your provider's office if you are unable to make an appointment.     3. Watch for warning signs. Your doctor or nurse will give you detailed warning signs to watch for and when to call for assistance. These instructions may also include educational information about your condition. If you experience any of warning signs to your health, call your doctor.               [facility]  Unless otherwise directed by your provider, please contact [facility], our nurse care line that is available for 24/7 assistance.     [phone] (toll-free)    Registered nurses in the [facility] provide clinical advisement, health education, appointment booking, and other advisory services.                    ** Verify the list of medication(s) below is accurate and up to date. Carry this  with you in case of emergency. If your medications have changed, please notify your healthcare provider.             Medication List      CHANGE how you take these medications        Additional Info                      alprazolam 0.25 MG tablet   Commonly known as:  XANAX   Quantity:  14 tablet   Refills:  0   Dose:  0.25 mg   What changed:    - how much to take  - when to take this  - reasons to take this    Instructions:  Take 1 tablet (0.25 mg total) by mouth once daily.     Begin Date    AM    Noon    PM    Bedtime       atorvastatin 80 MG tablet   Commonly known as:  LIPITOR   Quantity:  90 tablet   Refills:  3   Dose:  80 mg   What changed:  when to take this    Instructions:  Take 1 tablet (80 mg total) by mouth once daily.     Begin Date    AM    Noon    PM    Bedtime         CONTINUE taking these medications        Additional Info                      aspirin 81 MG EC tablet   Commonly known as:  ECOTRIN   Refills:  0   Dose:  81 mg    Last time this was given:  81 mg on 3/3/2017  8:57 AM   Instructions:  Take 1 tablet (81 mg total) by mouth once daily.     Begin Date    AM    Noon    PM    Bedtime       buPROPion 150 MG TB24 tablet   Commonly known as:  WELLBUTRIN XL   Quantity:  60 tablet   Refills:  2   Dose:  300 mg    Last time this was given:  300 mg on 3/3/2017  8:56 AM   Instructions:  Take 2 tablets (300 mg total) by mouth once daily.     Begin Date    AM    Noon    PM    Bedtime       docusate sodium 100 MG capsule   Commonly known as:  COLACE   Quantity:  90 capsule   Refills:  3   Dose:  100 mg    Instructions:  Take 1 capsule (100 mg total) by mouth 3 (three) times daily as needed for Constipation.     Begin Date    AM    Noon    PM    Bedtime       doxycycline 100 MG capsule   Commonly known as:  MONODOX   Refills:  0      Begin Date    AM    Noon    PM    Bedtime       FIORINAL ORAL   Refills:  0    Instructions:  Take by mouth as needed.     Begin Date    AM    Noon    PM    Bedtime        fluoxetine 20 MG capsule   Commonly known as:  PROZAC   Quantity:  60 capsule   Refills:  2   Dose:  40 mg    Last time this was given:  40 mg on 3/3/2017  8:57 AM   Instructions:  Take 2 capsules (40 mg total) by mouth once daily.     Begin Date    AM    Noon    PM    Bedtime       hydrocodone-acetaminophen 10-325mg  mg Tab   Commonly known as:  NORCO   Quantity:  61 tablet   Refills:  0   Dose:  1 tablet    Last time this was given:  1 tablet on 3/3/2017 12:17 PM   Instructions:  Take 1 tablet by mouth every 6 (six) hours as needed.     Begin Date    AM    Noon    PM    Bedtime       hyoscyamine 0.125 mg Tab   Commonly known as:  ANASPAZ,LEVSIN   Quantity:  120 tablet   Refills:  0    Instructions:  TAKE 1 TABLET (125 MCG TOTAL) BY MOUTH EVERY 6 (SIX) HOURS AS NEEDED.     Begin Date    AM    Noon    PM    Bedtime       levothyroxine 100 MCG tablet   Commonly known as:  SYNTHROID   Quantity:  90 tablet   Refills:  1   Dose:  100 mcg    Last time this was given:  100 mcg on 3/3/2017  6:15 AM   Instructions:  Take 1 tablet (100 mcg total) by mouth before breakfast.     Begin Date    AM    Noon    PM    Bedtime       ondansetron 8 MG tablet   Commonly known as:  ZOFRAN   Quantity:  60 tablet   Refills:  3   Dose:  8 mg    Instructions:  Take 1 tablet (8 mg total) by mouth every 12 (twelve) hours as needed for Nausea.     Begin Date    AM    Noon    PM    Bedtime       pantoprazole 40 MG tablet   Commonly known as:  PROTONIX   Quantity:  90 tablet   Refills:  3   Dose:  40 mg    Last time this was given:  40 mg on 3/3/2017  8:57 AM   Instructions:  Take 1 tablet (40 mg total) by mouth once daily.     Begin Date    AM    Noon    PM    Bedtime       polyethylene glycol 17 gram Pwpk   Commonly known as:  GLYCOLAX   Quantity:  30 packet   Refills:  5   Dose:  17 g    Last time this was given:  17 g on 3/3/2017  2:40 PM   Instructions:  Take 17 g by mouth 2 (two) times daily.     Begin Date    AM    Noon    PM     Bedtime       SENNA LAX ORAL   Refills:  0    Instructions:  Take by mouth as needed.     Begin Date    AM    Noon    PM    Bedtime         STOP taking these medications     clopidogrel 75 mg tablet   Commonly known as:  PLAVIX       furosemide 40 MG tablet   Commonly known as:  LASIX       lamotrigine 25 MG tablet   Commonly known as:  LAMICTAL       potassium chloride SA 20 MEQ tablet   Commonly known as:  K-DUR,KLOR-CON                  Please bring to all follow up appointments:    1. A copy of your discharge instructions.  2. All medicines you are currently taking in their original bottles.  3. Identification and insurance card.    Please arrive 15 minutes ahead of scheduled appointment time.    Please call 24 hours in advance if you must reschedule your appointment and/or time.        Your Scheduled Appointments     Mar 08, 2017  1:30 PM CST   Diagnostic Xray with NOM [id number] 485 LB LIMIT [facility] ([facility] )    [address] [facility] [address]   [phone]            Mar 08, 2017  2:20 PM CST   Established Patient Visit with ALEXY Taylor   [facility] - Neurosurgery 7th Fl ([facility] )    [address]  [place] [address]   [phone]            Mar 17, 2017 10:00 AM CDT   Established Patient Visit with Shireen Mayo MD   [facility] - Urology 4th Floor ([facility] )    [address]  [place] [address]   [phone]            Mar 23, 2017 10:00 AM CDT   Established Patient Visit with Mesfin Hodges II, MD   [facility] - Internal Medicine ([facility])    [address]  [place] [id number] [phone]              Follow-up Information     Follow up with Mesfin Hodges Ii, MD In 1 week.    Specialty:  Internal Medicine    Contact information:    [address]  [place] [id number] [phone]          Follow up with Nigel Hillman MD On 3/6/2017.    Specialty:  Pain Medicine     "Why:  3:15 pm Appointment    Contact information:    [address]  [address]  [facility] [address]  [phone]          Follow up with [facility].    Specialty:  Home Health Services    Why:  Home Health    Contact information:    [address] [address]  [phone]          Follow up with [facility].    Specialty:  DME Provider    Why:  DME    Contact information:    [address]  [facility] [address]  [phone]        Referrals     Future Orders    Ambulatory referral to Outpatient Case Management     Questions:    Does the patient have a chronic or uncontrolled disease process?:      Does the patient have a new diagnosis of a catastrophic or life altering illness/treatment?:      Does the patient have any psycho-social issues that may affect their ability to adhere to treatment plan?:      Does patient have any behaviors or circumstances that may impede ability to adhere to treatment plan?:      Is patient at risk for admission/readmission?:          Discharge Instructions     Future Orders    Activity as tolerated     Diet general     Questions:    Total calories:      Fat restriction, if any:      Protein restriction, if any:      Na restriction, if any:      Fluid restriction:      Additional restrictions:  Cardiac (Low Na/Chol)    WHEELCHAIR FOR HOME USE     Questions:    Hours in W/C per day:  12    Type of Wheelchair:  Standard    Size(Width):  16"(small adult)    Leg Support:  STD footrests    Arm Height:      Desired seat depth:      Back height:      Lower leg length:      Actual seat depth:      Lap Belt:  Velcro    Accessories:      Cushion:  Basic    Justification for cushion:      Height:  5' 2" (1.575 m)    Weight:  73.4 kg (161 lb 13.1 oz)    Does patient have medical equipment at home?:  rollator    walker, rolling    bedside commode    bath bench    Length of need (1-99 months):  99    Please check all that apply:  Caregiver is capable and willing " "to operate wheelchair safely.      Discharge References/Attachments     FALL PREVENTION (ENGLISH)    FALL DUE TO DIZZINESS, WEAKNESS, OR LOSS OF BALANCE (ENGLISH)    FALLS IN THE HOME, PREVENTING (ENGLISH)    FALLS, PREVENTING, MAKE YOUR HEALTH A PRIORITY (ENGLISH)    HEART FAILURE, DISCHARGE INSTRUCTIONS FOR (ENGLISH)    HEART FAILURE: WARNING SIGNS OF A FLARE-UP (ENGLISH)    HEART FAILURE, WHAT IS (ENGLISH)    HEART FAILURE: TRACKING YOUR WEIGHT (ENGLISH)    HEART FAILURE: TAKING MEDICATION TO CONTROL  (ENGLISH)    TRANSFER: WHEELCHAIR TO CHAIR  (ENGLISH)    TRANSFER: WHEELCHAIR TO TOILET    (ENGLISH)    TRANSFER: BED TO WHEELCHAIR  (ENGLISH)        Primary Diagnosis     Your primary diagnosis was:  Fainting      Admission Information     Date & Time Provider Department CSN    3/2/2017  2:01 PM Kwame Concepcion MD [facility] [id number]      Care Providers     Provider Role Specialty Primary office phone    Kwame Concepcion MD Attending Provider Hospitalist [phone]    Kwame Concepcion MD Physician  Hospitalist  [phone]      Your Vitals Were     BP Pulse Temp Resp Height Weight    101/58 (BP Location: Right arm, Patient Position: Lying, BP Method: Automatic) 49 97.9 °F (36.6 °C) (Oral) 16 5' 2" (1.575 m) 73.4 kg (161 lb 13.1 oz)    Last Period SpO2 BMI          (LMP Unknown) 94% 29.6 kg/m2        Recent Lab Values        2/16/2012 6/23/2012 8/13/2013 5/28/2014 3/28/2015 8/25/2016            9:42 AM  7:00 AM 10:40 AM  5:36 AM 12:45 AM 10:10 AM      A1C 5.5 4.7 5.9 5.8 5.4 5.4      Comment for A1C at 10:10 AM on 8/25/2016:  According to ADA guidelines, hemoglobin A1C <7.0% represents  optimal control in non-pregnant diabetic patients.  Different  metrics may apply to specific populations.   Standards of Medical Care in Diabetes - 2016.  For the purpose of screening for the presence of diabetes:  <5.7%     Consistent with the absence of diabetes  5.7-6.4%  Consistent with increasing " risk for diabetes   (prediabetes)  >or=6.5%  Consistent with diabetes  Currently no consensus exists for use of hemoglobin A1C  for diagnosis of diabetes for children.        Pending Labs     Order Current Status    Urine culture In process    Blood culture Preliminary result      Allergies as of 3/3/2017        Reactions    Imitrex [Sumatriptan Succinate] Shortness Of Breath    Penicillins Shortness Of Breath    Other reaction(s): Jittery    Percocet [Oxycodone-acetaminophen] Anaphylaxis    Topamax [Topiramate] Shortness Of Breath    Vancomycin Shortness Of Breath    Rash    (D)-limonene Flavor     Other reaction(s): difficult intubation  Other reaction(s): Jittery  Other reaction(s): Difficulty breathing  Other reaction(s): Difficulty breathing  Other reaction(s): Jittery  Other reaction(s): Difficulty breathing    Bactrim [Sulfamethoxazole-trimethoprim] Nausea And Vomiting    Other reaction(s): Resp Depression  Other reaction(s): Anaphylaxis    Butorphanol Tartrate     Darvocet A500 [Propoxyphene N-acetaminophen]     Other reaction(s): Jittery  Other reaction(s): Difficulty breathing    Fentanyl     Other reaction(s): Vomiting  Other reaction(s): Nausea  Other reaction(s): Itching  swelling    Phenytoin Sodium Extended     pATIENT DENIES EVER HAVING THIS MEDICATION    White Petrolatum-zinc     Zinc Oxide-white Petrolatum     Other reaction(s): Difficulty breathing    Latex, Natural Rubber Itching, Rash      Advance Directives     An advance directive is a document which, in the event you are no longer able to make decisions for yourself, tells your healthcare team what kind of treatment you do or do not want to receive, or who you would like to make those decisions for you.  If you do not currently have an advance directive, [facility] encourages you to create one.  For more information call:  [phone]), [phone] ([phone]),  or log on to [email or web address].        Language Assistance  Services     ATTENTION: Language assistance services are available, free of charge. Please call [phone].      ATENCIÓN: Si habla israel, tiene a morgan disposición servicios gratuitos de asistencia lingüística. Llame al [phone].     CHÚ Ý: N?u b?n nói Ti?ng Vi?t, có các d?ch v? h? tr? ngôn ng? mi?n phí dành cho b?n. G?i s? [phone].         [facility] complies with applicable Federal civil rights laws and does not discriminate on the basis of race, color, national origin, age, disability, or sex.

## 2017-03-02 NOTE — ED NOTES
Pt states she fell this morning while standing with her walker and playing with her dog. C/o laceration to left scalp. Pt also had a recent fall on Sunday, in which she hit her right forehead. Had a negative head CT yesterday, per pt and . Pt states she remembers falling, and  denies any change in behavior since the fall today. Pt c/o headache and mild nausea, but denies other complaints at this time. Reports chronic left-sided weakness, secondary to back problems. Denies any increase in weakness today. MELISSA. Pt is AAOx4.

## 2017-03-02 NOTE — ED PROVIDER NOTES
Encounter Date: 3/2/2017       History     Chief Complaint   Patient presents with    Fall     Fell at home today, hitting L forehead, states also fell on Saturday hitting R forehead with bruising.      Review of patient's allergies indicates:   Allergen Reactions    Imitrex [sumatriptan succinate] Shortness Of Breath    Penicillins Shortness Of Breath     Other reaction(s): Jittery    Percocet [oxycodone-acetaminophen] Anaphylaxis    Topamax [topiramate] Shortness Of Breath    Vancomycin Shortness Of Breath     Rash    (d)-limonene flavor      Other reaction(s): difficult intubation  Other reaction(s): Jittery  Other reaction(s): Difficulty breathing  Other reaction(s): Difficulty breathing  Other reaction(s): Jittery  Other reaction(s): Difficulty breathing    Bactrim [sulfamethoxazole-trimethoprim] Nausea And Vomiting     Other reaction(s): Resp Depression  Other reaction(s): Anaphylaxis    Butorphanol tartrate     Darvocet a500 [propoxyphene n-acetaminophen]      Other reaction(s): Jittery  Other reaction(s): Difficulty breathing    Fentanyl      Other reaction(s): Vomiting  Other reaction(s): Nausea  Other reaction(s): Itching  swelling    Phenytoin sodium extended      pATIENT DENIES EVER HAVING THIS MEDICATION    White petrolatum-zinc     Zinc oxide-white petrolatum      Other reaction(s): Difficulty breathing    Latex, natural rubber Itching and Rash     HPI   Patient presents emergency department secondary to a syncopal episode this morning.  The history of this patient is very convoluted.  The patient apparently had questionable syncopal episode on Monday and has evidence of severe ecchymosis to the right for head and right eye.  They did not seek medical attention at that time.  Patient at that time stated she was trying to let her dog out was standing at the time with a rolling walker and next thing she knew she was on the floor.  Patient does not actually remember what happened at the  time of the incident.  Patient then yesterday was supposed to have an appointment with her back doctor.  She was initially seen by the physician assistant who noted the severe ecchymosis to the right forehead and facial area and sent the patient immediately for a CAT scan of the head.  Patient was told if they did not hear any results from the hospitalt the CAT scan was normal.  Patient this morning however got up at approximately 5:00 in the morning, she was rolling in her home in her seated walker.  The next thing she remembers is being in the kitchen and then being on the kitchen floor.  Her  found her on the kitchen floor slightly disoriented with a laceration to the left temporal area.  She appeared very pale and weak l at that time.  Patient refused to come to the hospital until this afternoon.  Patient does feel very weak.  The  says she has lost 40 pounds over the last 6-7 months.  She appears very pale and dehydrated.  She states she is eating small amounts of food only.  Patient is also on a very large amounts of medication.  She has the implanted Dilaudid pump for her chronic back problems.  She also takes 1-2 Norco per day.  Patient also takes Xanax and Wellbutrin and Prozac during the day.  Patient at this time is alert and oriented ×3 she complains of some mild neck discomfort.  She has obvious laceration to the left temporal area.  She denies any worsening back pain and no chest pain no abdominal discomfort.  Patient is in moderate to severe distress.  Past Medical History:   Diagnosis Date    Allergy     Anxiety     Arthritis     Carotid artery occlusion     Cataract     Depression     Edema     chronic    Fever blister     Hypothyroid     Iron deficiency anemia     Joint pain     Lumbar radiculopathy     with chronic pain    Ocular migraine     Sleep apnea     cpap     Past Surgical History:   Procedure Laterality Date    BACK SURGERY      x 12    CATARACT EXTRACTION W/   INTRAOCULAR LENS IMPLANT Left     Dr Coleman     HYSTERECTOMY  1975    endometriosis    pain pump placement      SPINE SURGERY  5-13-13    CERVICAL FUSION     Family History   Problem Relation Age of Onset    Cancer Mother 55     breast    Cancer Father      esophagus,had laryngectomy    Parkinsonism Maternal Grandmother     Tremor Maternal Grandmother     No Known Problems Brother     No Known Problems Brother     Heart disease Maternal Uncle     No Known Problems Sister     No Known Problems Maternal Aunt     No Known Problems Paternal Aunt     No Known Problems Paternal Uncle     No Known Problems Maternal Grandfather     No Known Problems Paternal Grandmother     No Known Problems Paternal Grandfather     Melanoma Neg Hx     Amblyopia Neg Hx     Blindness Neg Hx     Cataracts Neg Hx     Diabetes Neg Hx     Glaucoma Neg Hx     Hypertension Neg Hx     Macular degeneration Neg Hx     Retinal detachment Neg Hx     Strabismus Neg Hx     Stroke Neg Hx     Thyroid disease Neg Hx      Social History   Substance Use Topics    Smoking status: Never Smoker    Smokeless tobacco: Never Used    Alcohol use No      Comment: denies     Review of Systems   Constitutional: Positive for fatigue. Negative for chills and fever.   HENT: Negative for congestion and sore throat.    Eyes: Negative.  Negative for photophobia and visual disturbance.   Respiratory: Negative for chest tightness, shortness of breath and wheezing.    Cardiovascular: Negative for chest pain.   Gastrointestinal: Positive for abdominal pain. Negative for diarrhea, nausea and vomiting.        Patient reports occasional abdominal discomfort   Genitourinary: Negative for difficulty urinating, dysuria and frequency.   Musculoskeletal: Negative for gait problem and neck pain.   Skin: Negative for rash.   Neurological: Positive for dizziness, syncope, weakness and light-headedness. Negative for tremors and numbness.    Psychiatric/Behavioral: Positive for decreased concentration.   All other systems reviewed and are negative.      Physical Exam   Initial Vitals   BP Pulse Resp Temp SpO2   03/02/17 1340 03/02/17 1340 03/02/17 1340 03/02/17 1340 03/02/17 1340   102/54 58 18 97.8 °F (36.6 °C) 97 %     Physical Exam    Nursing note and vitals reviewed.  Constitutional: She appears well-developed and well-nourished. She appears distressed.   HENT:   Head: Normocephalic and atraumatic.   Mouth/Throat: Oropharynx is clear and moist.   Patient has old ecchymosis to the right lauro with raccoon eyes bilaterally.  Patient has a laceration to the left temporal area which is tender to palpation.  Oral mucosa is very dry.   Eyes: Conjunctivae and EOM are normal. Pupils are equal, round, and reactive to light.   Neck: Normal range of motion. Neck supple.   Cardiovascular: Normal rate and regular rhythm. Exam reveals no gallop and no friction rub.    No murmur heard.  Pulmonary/Chest: Breath sounds normal. She has no wheezes. She has no rhonchi. She has no rales.   Abdominal: Soft. There is no tenderness. There is no rebound and no guarding.   Musculoskeletal: Normal range of motion.   Patient has pain with palpation to the cervical spine.  There is slight paravertebral spasm with decreased range of motion and rotation bilaterally.  There is no neurosensory compromise.   Neurological: She is alert and oriented to person, place, and time. She has normal strength. No sensory deficit.   Skin: Skin is warm and dry. There is pallor.   Psychiatric: She has a normal mood and affect.         ED Course   Procedures  Labs Reviewed   COMPREHENSIVE METABOLIC PANEL - Abnormal; Notable for the following:        Result Value    CO2 33 (*)     Anion Gap 7 (*)     All other components within normal limits   CULTURE, BLOOD   CULTURE, URINE   CK   TROPONIN I   URINALYSIS   DRUG SCREEN PANEL, URINE EMERGENCY   CBC W/ AUTO DIFFERENTIAL   CBC W/ AUTO  DIFFERENTIAL             Medical Decision Making:   History:   I obtained history from: someone other than patient.  Old Medical Records: I decided to obtain old medical records.  Differential Diagnosis:   Patient presented to the emergency department after having a syncopal episode at home this morning.  The patient reportedly has had 2 syncopal episodes in a matter of 4 days.  Patient on arrival to the emergency department seems somewhat disoriented also seemed to be slurring her words slightly with a question of overmedication.  Patient had hematoma to the left for head which was new she had an old hematoma to the right forehead.  Patient has also very small laceration to the left temporal parietal scalp.  Which does not require any intervention at this time.  Patient was worked up in the emergency department for syncope.  Her urine was negative her drug screen was positive for opiates and barbiturates her urine sample was negative for infection or neck enzymes were negative.  Chemistries were within normal limits-- patient has CT of the cervical spine with no evidence of acute fracture.  She does however have multilevel cervical spine DJD.  Patient's CT of the head showed a new large area of left temporal parietal subcutaneous soft tissue thickening and the unchanged smaller right frontal subcutaneous soft tissue thickening/hematoma.  Patient has no intracranial abnormalities.  Patient is going to be admitted to the hospital to the hospitalist group.  Dr. Concepcion was called for the admission.  The case was discussed.  Patient does not appear dehydrated, orthostatics were negative patient at this time will be admitted for syncope workup and cardiac monitoring.  There may be a component of overmedication for this patient with her syncopal episodes.  Patient understands the plan of care.  At this time of admission the patient is feeling much improved her color has improved.  She is talking normally without slurring  her words.  Independently Interpreted Test(s):   I have ordered and independently interpreted X-rays - see prior notes.  I have ordered and independently interpreted EKG Reading(s) - see prior notes  Clinical Tests:   Lab Tests: Ordered and Reviewed  The following lab test(s) were unremarkable: CBC, BMP, Troponin and Urinalysis  Radiological Study: Ordered and Reviewed  Medical Tests: Ordered and Reviewed  Other:   I have discussed this case with another health care provider.                   ED Course     Clinical Impression:   The primary encounter diagnosis was Syncope and collapse. Diagnoses of Scalp laceration, initial encounter and Head injury due to trauma, initial encounter were also pertinent to this visit.          Starr Garcia,   03/02/17 1812

## 2017-03-02 NOTE — TELEPHONE ENCOUNTER
----- Message from Jaycee Alford RN sent at 3/2/2017  2:03 PM CST -----  Contact: self @ [phone]      ----- Message -----     From: Christina Kiser     Sent: 3/2/2017   8:44 AM       To: Balta BARROS Staff    Pt is calling for her ct scan results from yesterday.

## 2017-03-03 ENCOUNTER — DOCUMENTATION ONLY (OUTPATIENT)
Dept: PSYCHIATRY | Facility: CLINIC | Age: 62
End: 2017-03-03

## 2017-03-03 ENCOUNTER — OUTPATIENT CASE MANAGEMENT (OUTPATIENT)
Dept: ADMINISTRATIVE | Facility: OTHER | Age: 62
End: 2017-03-03

## 2017-03-03 VITALS
DIASTOLIC BLOOD PRESSURE: 54 MMHG | HEART RATE: 58 BPM | WEIGHT: 161.81 LBS | HEIGHT: 62 IN | BODY MASS INDEX: 29.78 KG/M2 | OXYGEN SATURATION: 94 % | SYSTOLIC BLOOD PRESSURE: 96 MMHG | TEMPERATURE: 98 F | RESPIRATION RATE: 18 BRPM

## 2017-03-03 LAB
ANION GAP SERPL CALC-SCNC: 5 MMOL/L
BACTERIA UR CULT: NO GROWTH
BASOPHILS # BLD AUTO: 0 K/UL
BASOPHILS NFR BLD: 0 %
BUN SERPL-MCNC: 8 MG/DL
CALCIUM SERPL-MCNC: 8.6 MG/DL
CHLORIDE SERPL-SCNC: 104 MMOL/L
CO2 SERPL-SCNC: 31 MMOL/L
CREAT SERPL-MCNC: 0.9 MG/DL
DIASTOLIC DYSFUNCTION: NO
DIFFERENTIAL METHOD: ABNORMAL
EOSINOPHIL # BLD AUTO: 0.1 K/UL
EOSINOPHIL NFR BLD: 2.4 %
ERYTHROCYTE [DISTWIDTH] IN BLOOD BY AUTOMATED COUNT: 13.6 %
EST. GFR  (AFRICAN AMERICAN): >60 ML/MIN/1.73 M^2
EST. GFR  (NON AFRICAN AMERICAN): >60 ML/MIN/1.73 M^2
ESTIMATED PA SYSTOLIC PRESSURE: 38.25
GLUCOSE SERPL-MCNC: 137 MG/DL
HCT VFR BLD AUTO: 31.1 %
HGB BLD-MCNC: 9.3 G/DL
LYMPHOCYTES # BLD AUTO: 1.5 K/UL
LYMPHOCYTES NFR BLD: 40.4 %
MAGNESIUM SERPL-MCNC: 1.8 MG/DL
MCH RBC QN AUTO: 26.9 PG
MCHC RBC AUTO-ENTMCNC: 29.9 %
MCV RBC AUTO: 90 FL
MITRAL VALVE MOBILITY: NORMAL
MITRAL VALVE REGURGITATION: NORMAL
MONOCYTES # BLD AUTO: 0.4 K/UL
MONOCYTES NFR BLD: 11.1 %
NEUTROPHILS # BLD AUTO: 1.7 K/UL
NEUTROPHILS NFR BLD: 45.8 %
PHOSPHATE SERPL-MCNC: 3.1 MG/DL
PLATELET # BLD AUTO: 188 K/UL
PMV BLD AUTO: 10.5 FL
POTASSIUM SERPL-SCNC: 3.8 MMOL/L
RBC # BLD AUTO: 3.46 M/UL
RETIRED EF AND QEF - SEE NOTES: 55 (ref 55–65)
SODIUM SERPL-SCNC: 140 MMOL/L
TRICUSPID VALVE REGURGITATION: NORMAL
TROPONIN I SERPL DL<=0.01 NG/ML-MCNC: <0.006 NG/ML
WBC # BLD AUTO: 3.79 K/UL

## 2017-03-03 PROCEDURE — G0378 HOSPITAL OBSERVATION PER HR: HCPCS

## 2017-03-03 PROCEDURE — 25000003 PHARM REV CODE 250: Performed by: NURSE PRACTITIONER

## 2017-03-03 PROCEDURE — 97165 OT EVAL LOW COMPLEX 30 MIN: CPT

## 2017-03-03 PROCEDURE — 63600175 PHARM REV CODE 636 W HCPCS: Performed by: HOSPITALIST

## 2017-03-03 PROCEDURE — G8988 SELF CARE GOAL STATUS: HCPCS | Mod: CK

## 2017-03-03 PROCEDURE — 85025 COMPLETE CBC W/AUTO DIFF WBC: CPT

## 2017-03-03 PROCEDURE — 80048 BASIC METABOLIC PNL TOTAL CA: CPT

## 2017-03-03 PROCEDURE — G8979 MOBILITY GOAL STATUS: HCPCS | Mod: CJ

## 2017-03-03 PROCEDURE — 93306 TTE W/DOPPLER COMPLETE: CPT

## 2017-03-03 PROCEDURE — G8989 SELF CARE D/C STATUS: HCPCS | Mod: CK

## 2017-03-03 PROCEDURE — G8978 MOBILITY CURRENT STATUS: HCPCS | Mod: CK

## 2017-03-03 PROCEDURE — 25000003 PHARM REV CODE 250: Performed by: HOSPITALIST

## 2017-03-03 PROCEDURE — 93306 TTE W/DOPPLER COMPLETE: CPT | Mod: 26,,, | Performed by: INTERNAL MEDICINE

## 2017-03-03 PROCEDURE — 97116 GAIT TRAINING THERAPY: CPT | Mod: 59

## 2017-03-03 PROCEDURE — 84100 ASSAY OF PHOSPHORUS: CPT

## 2017-03-03 PROCEDURE — 97161 PT EVAL LOW COMPLEX 20 MIN: CPT

## 2017-03-03 PROCEDURE — G8987 SELF CARE CURRENT STATUS: HCPCS | Mod: CK

## 2017-03-03 PROCEDURE — 94761 N-INVAS EAR/PLS OXIMETRY MLT: CPT

## 2017-03-03 PROCEDURE — 83735 ASSAY OF MAGNESIUM: CPT

## 2017-03-03 PROCEDURE — 84484 ASSAY OF TROPONIN QUANT: CPT | Mod: 91

## 2017-03-03 PROCEDURE — 97535 SELF CARE MNGMENT TRAINING: CPT

## 2017-03-03 PROCEDURE — 36415 COLL VENOUS BLD VENIPUNCTURE: CPT

## 2017-03-03 PROCEDURE — 97530 THERAPEUTIC ACTIVITIES: CPT

## 2017-03-03 PROCEDURE — 29580 STRAPPING UNNA BOOT: CPT

## 2017-03-03 RX ORDER — ALPRAZOLAM 0.5 MG/1
TABLET ORAL
Qty: 60 TABLET | Refills: 1 | OUTPATIENT
Start: 2017-03-03

## 2017-03-03 RX ORDER — FLUOXETINE HYDROCHLORIDE 20 MG/1
CAPSULE ORAL
Qty: 60 CAPSULE | Refills: 1 | OUTPATIENT
Start: 2017-03-03

## 2017-03-03 RX ORDER — POLYETHYLENE GLYCOL 3350 17 G/17G
17 POWDER, FOR SOLUTION ORAL DAILY
Status: DISCONTINUED | OUTPATIENT
Start: 2017-03-03 | End: 2017-03-03 | Stop reason: HOSPADM

## 2017-03-03 RX ORDER — BUTALBITAL, ACETAMINOPHEN AND CAFFEINE 50; 325; 40 MG/1; MG/1; MG/1
1 TABLET ORAL EVERY 4 HOURS PRN
Status: DISCONTINUED | OUTPATIENT
Start: 2017-03-03 | End: 2017-03-03 | Stop reason: HOSPADM

## 2017-03-03 RX ORDER — AMOXICILLIN 250 MG
1 CAPSULE ORAL DAILY PRN
Status: DISCONTINUED | OUTPATIENT
Start: 2017-03-03 | End: 2017-03-03 | Stop reason: HOSPADM

## 2017-03-03 RX ORDER — BUPROPION HYDROCHLORIDE 150 MG/1
TABLET ORAL
Qty: 60 TABLET | Refills: 1 | OUTPATIENT
Start: 2017-03-03

## 2017-03-03 RX ADMIN — FLUOXETINE 40 MG: 20 CAPSULE ORAL at 08:03

## 2017-03-03 RX ADMIN — HYDROCODONE BITARTRATE AND ACETAMINOPHEN 1 TABLET: 10; 325 TABLET ORAL at 12:03

## 2017-03-03 RX ADMIN — FAMOTIDINE 20 MG: 20 TABLET, FILM COATED ORAL at 08:03

## 2017-03-03 RX ADMIN — POLYETHYLENE GLYCOL 3350 17 G: 17 POWDER, FOR SOLUTION ORAL at 02:03

## 2017-03-03 RX ADMIN — BUPROPION HYDROCHLORIDE 300 MG: 150 TABLET, FILM COATED, EXTENDED RELEASE ORAL at 08:03

## 2017-03-03 RX ADMIN — SODIUM CHLORIDE 500 ML: 0.9 INJECTION, SOLUTION INTRAVENOUS at 09:03

## 2017-03-03 RX ADMIN — LEVOTHYROXINE SODIUM 100 MCG: 100 TABLET ORAL at 06:03

## 2017-03-03 RX ADMIN — ASPIRIN 81 MG: 81 TABLET, COATED ORAL at 08:03

## 2017-03-03 RX ADMIN — HYDROCODONE BITARTRATE AND ACETAMINOPHEN 1 TABLET: 10; 325 TABLET ORAL at 02:03

## 2017-03-03 RX ADMIN — PANTOPRAZOLE SODIUM 40 MG: 40 TABLET, DELAYED RELEASE ORAL at 08:03

## 2017-03-03 RX ADMIN — ENOXAPARIN SODIUM 40 MG: 100 INJECTION SUBCUTANEOUS at 11:03

## 2017-03-03 RX ADMIN — BUTALBITAL, ACETAMINOPHEN, AND CAFFEINE 1 TABLET: 50; 325; 40 TABLET ORAL at 08:03

## 2017-03-03 NOTE — ASSESSMENT & PLAN NOTE
Fall  Patient has had up to 4 syncopal episodes since 2/1/17.  She has not been to see her pain management doctor and her pain pump continues to infuse at an unknown rate.  CT of the Head negative for intracranial processes.  Cardiac Workup negative. THis is most likely medication related.     Spoke with ALEXY Viera with Dr. Hillman for concerns of multiple syncopal episodes.  She has an appointment 3/6/17 for evaluation of medication adjustments.

## 2017-03-03 NOTE — PLAN OF CARE
Problem: Physical Therapy Goal  Goal: Physical Therapy Goal  Goals to be met by: 3/10/17    Patient will increase functional independence with mobility by performin. Sit to supine with [place]  2. Sit to stand transfer with Modified [place]  3. Gait x 50 feet with Modified [place] and Supervision using Rolling Walker.   4. Wheelchair propulsion x50 feet with Modified [place] and Supervision using bilateral lower extremities  5. Lower extremity exercise program x15 reps   Outcome: Ongoing (interventions implemented as appropriate)  Rec 24 hr care/supervision for safety with mobility upon discharge from hospital.  Pt refuses SNF.  Recommend wheelchair rather than rollator independence due to poor safety and multiple falls with progress to be made with  PT and OT as safety improves.

## 2017-03-03 NOTE — ASSESSMENT & PLAN NOTE
She has experienced 30-40 pound weight loss. Chronic debility and depression.  Please follow up with PCP.

## 2017-03-03 NOTE — ASSESSMENT & PLAN NOTE
Lymphedema of Bilateral Lower Extremeties  -Holding home furosemide 40 mg daily, Potassium 20 MEQ daiily, Plavix 75 mg daily d/t Traumatic Syncope  -Resume ASA 81 mg daily   -3/3/17 Echo shows EF 55%.  Improved from prior Echo  -Troponin negative times 3

## 2017-03-03 NOTE — ASSESSMENT & PLAN NOTE
Urinary Retention  Secondary to sever Lumbar disease.  She has a suprapubic catheter. Urinalysis clean. Followed by Dr. Griffin.

## 2017-03-03 NOTE — PT/OT/SLP EVAL
Physical Therapy  Evaluation    Tasha Hawley   MRN: [id number]   Admitting Diagnosis: Syncope and collapse    PT Received On: 03/03/17  PT Start Time: 1049     PT Stop Time: 1117    PT Total Time (min): 28 min       Billable Minutes:  Evaluation 15 and Gait [id number]    Diagnosis: Syncope and collapse  Multiple recent falls    Past Medical History:   Diagnosis Date    Allergy     Anxiety     Arthritis     Carotid artery occlusion     Cataract     Depression     Edema     chronic    Fever blister     Hypothyroid     Iron deficiency anemia     Joint pain     Lumbar radiculopathy     with chronic pain    Ocular migraine     Sleep apnea     cpap      Past Surgical History:   Procedure Laterality Date    BACK SURGERY      x 12    CATARACT EXTRACTION W/  INTRAOCULAR LENS IMPLANT Left     Dr Coleman     HYSTERECTOMY  1975    endometriosis    pain pump placement      SPINE SURGERY  5-13-13    CERVICAL FUSION         General Precautions: Standard, fall  Orthopedic Precautions:     Braces:         Do you have any cultural, spiritual, Jain conflicts, given your current situation?: no    Patient History:  Lives With: spouse  Living Arrangements: house  Home Accessibility:  (one step entry w ramp)  Home Layout: Able to live on 1st floor  Living Environment Comment: lives w elderly  in [facility] with ramp to enter, amb with rollator w multiple falls, poor sleeping habits and decreased safety reported by pt and her daughter; falling asleep while seated on rollator and falling off, or tripping over rollator   Equipment Currently Used at Home: walker, rolling, wheelchair, rollator (2 motorized scooters)    Previous Level of Function:  Ambulation Skills: needs device (household use of rollator)  Transfer Skills: independent  ADL Skills: independent (ind and assist reported - PRN)  Work/Leisure Activity: independent    Subjective:  Communicated with nsg prior to session.    Chief Complaint: pain in back and  "HA  Patient goals: PLOF - independence    Pain Ratin/10         Location:  (pt reports chronic back pain along with current headache "throbbing" -  pt with bruising noted on forehead)  Pain Addressed: Reposition, Distraction, Nurse notified  Pain Rating Post-Intervention: 9/10 (no change in pain levels reported)    Objective:   Patient found with: peripheral IV, meadows catheter, telemetry, bed alarm (suprapubic catheter)     Cognitive Exam:  Oriented to: Person, Place, Time and Situation -- increased time required and date/day unknown    Follows Commands/attention: Easily distracted and Follows one-step commands  Communication: clear/fluent  Safety awareness/insight to disability: impaired    Physical Exam:  Postural examination/scapula alignment: Rounded shoulder, Head forward, Lateral weight shift of hips, Kyphosis and Scoliosis   chronic    Skin integrity: Wound BLE lower leg edema and bandages  Edema: BLE    Sensation:   Impaired    Upper Extremity Range of Motion:  Right Upper Extremity: WFL  Left Upper Extremity: WFL    Upper Extremity Strength:  Right Upper Extremity: WFL   See OT detailed eval  Left Upper Extremity: WFL    Lower Extremity Range of Motion:  Right Lower Extremity: WFL  Left Lower Extremity: WFL    Lower Extremity Strength:  Right Lower Extremity: Deficits: grossly 2+/5  Left Lower Extremity: Deficits: grossly 2+/5         Functional Mobility:  Bed Mobility:  Rolling/Turning to Left: Modified independent  Rolling/Turning Right: Modified independent  Scooting/Bridging: Modified Independent  Supine to Sit: Stand by Assistance  Sit to Supine: Moderate Assistance, With leg lift    Transfers:  Sit <> Stand Assistance: Contact Guard Assistance  Sit <> Stand Assistive Device: Other (see comments) (pt's own rollator)    Gait:   Gait Distance: pt amb ~6' with rollator with CGA x 2 and 100% VC for safety, pt side stepping and tripping over walker wheels without awareness then LOB w falling to Right " and requiring Max assist x 1 to correct for LOB and CGA x 1 for safety and assist with AD,   pt with poor reaction time and self recovery attempt during LOB  Assistance 1: Maximum assistance, Contact Guard Assistance  Gait Assistive Device: Rollator  Gait Pattern: swing-to gait  Gait Deviation(s): decreased cj, increased time in double stance, decreased velocity of limb motion, decreased step length, decreased toe-to-floor clearance        Balance:   Static Sit: FAIR+: Able to take MINIMAL challenges from all directions  Dynamic Sit: FAIR+: Maintains balance through MINIMAL excursions of active trunk motion  Static Stand: POOR: Needs MODERATE assist to maintain  Dynamic stand: 0: N/A  Req RW and with LOB req'd max assist to prevent fall    Therapeutic Activities and Exercises:  Safety ed provided to pt and daughter; rec w/c use increased until improved safety with rollators/RW with assessment of HH PT, bed mobility training, therex initiated, seated EOB, posture re ed seated and in stance, gait training, txf training    AM-PAC 6 CLICK MOBILITY  How much help from another person does this patient currently need?   1 = Unable, Total/Dependent Assistance  2 = A lot, Maximum/Moderate Assistance  3 = A little, Minimum/Contact Guard/Supervision  4 = None, Modified [place]/Independent    Turning over in bed (including adjusting bedclothes, sheets and blankets)?: 4  Sitting down on and standing up from a chair with arms (e.g., wheelchair, bedside commode, etc.): 3  Moving from lying on back to sitting on the side of the bed?: 3  Moving to and from a bed to a chair (including a wheelchair)?: 3  Need to walk in hospital room?: 2  Climbing 3-5 steps with a railing?: 1  Total Score: 16     AM-PAC Raw Score CMS G-Code Modifier Level of Impairment Assistance   6 % Total / Unable   7 - 9 CM 80 - 100% Maximal Assist   10 - 14 CL 60 - 80% Moderate Assist   15 - 19 CK 40 - 60% Moderate Assist   20 - 22 CJ 20 - 40%  Minimal Assist   23 CI 1-20% SBA / CGA   24 CH 0% Independent/ Mod I     Patient left supine with all lines intact, call button in reach, bed alarm on, nsg notified and daughter and  present.    Assessment:   Tasha Hawley is a 61 y.o. female with a medical diagnosis of Syncope and collapse and presents with poor safety awareness w mobility activities.  Rehab identified problem list/impairments: Rehab identified problem list/impairments: weakness, gait instability, impaired endurance, impaired balance, impaired sensation, impaired self care skills, impaired cognition, pain, decreased coordination, impaired functional mobilty, edema, impaired skin, decreased lower extremity function    Rehab potential is good.    Activity tolerance: Good    Discharge recommendations: Discharge Facility/Level Of Care Needs: home with home health, home health PT, home health OT (recommend 24 hr care/supervision at this time for safety; pt refuses SNF; )     Barriers to discharge: Barriers to Discharge: Decreased caregiver support    Equipment recommendations: Equipment Needed After Discharge: wheelchair     GOALS:   Physical Therapy Goals        Problem: Physical Therapy Goal    Goal Priority Disciplines Outcome Goal Variances Interventions   Physical Therapy Goal     PT/OT, PT      Description:  Goals to be met by: 3/10/17    Patient will increase functional independence with mobility by performin. Sit to supine with [place]  2. Sit to stand transfer with Modified [place]  3. Gait  x 50 feet with Modified [place] and Supervision using Rolling Walker.   4. Wheelchair propulsion x50 feet with Modified [place] and Supervision using bilateral lower extremities  5. Lower extremity exercise program x15 reps               PLAN:    Patient to be seen 5 x/week to address the above listed problems via gait training, therapeutic activities, therapeutic exercises, neuromuscular re-education, wheelchair  management/training  Plan of Care expires: 04/03/17  Plan of Care reviewed with: patient, daughter, caregiver    Functional Assessment Tool Used: [facility]  Score: 16  Functional Limitation: Mobility: Walking and moving around  Mobility: Walking and Moving Around Current Status (): CK  Mobility: Walking and Moving Around Goal Status (): STEVEN Zarco, PT  03/03/2017

## 2017-03-03 NOTE — PT/OT/SLP EVAL
"Occupational Therapy  Evaluation/Discharge    Tasha Hawely   MRN: [id number]   Admitting Diagnosis: Syncope and collapse    OT Date of Treatment: 17   OT Start Time: 1133  OT Stop Time: 1235  OT Total Time (min): 62 min    Billable Minutes:  Evaluation 15  Self Care/Home Management 15  Therapeutic Activity 32    Diagnosis: Syncope and collapse       Past Medical History:   Diagnosis Date    Allergy     Anxiety     Arthritis     Carotid artery occlusion     Cataract     Depression     Edema     chronic    Fever blister     Hypothyroid     Iron deficiency anemia     Joint pain     Lumbar radiculopathy     with chronic pain    Ocular migraine     Sleep apnea     cpap      Past Surgical History:   Procedure Laterality Date    BACK SURGERY      x 12    CATARACT EXTRACTION W/  INTRAOCULAR LENS IMPLANT Left     Dr Coleman     HYSTERECTOMY  1975    endometriosis    pain pump placement      SPINE SURGERY  13    CERVICAL FUSION         General Precautions: Standard, fall  Orthopedic Precautions:    Braces:            Patient History:  Living Environment  Living Environment Comment: Lives w/spouse in [facility] and ran[place]. Uses rollator at home, is able to perform ADLs and mobilty however has frequent falls. Daughter reports pt w/poor sleeping habits and will fall asleep seated on toilet or while sitting on rollator. Has TTB, BSC, rollaotr, RW. Has a borrowed wc.  Equipment Currently Used at Home: rollator, walker, rolling, bedside commode, bath bench    Prior level of function:            Dominant hand: right    Subjective:  Communicated with nurse prior to session.  "I have so much pain" "they are trying to take away my independence"  Chief Complaint: pain  Patient/Family stated goals: maintain indep    Pain Ratin/10     Location - Orientation: lower  Location: back  Pain Addressed: Reposition, Distraction, Cessation of Activity, Nurse notified  Pain Rating Post-Intervention: " 9/10    Objective:       Cognitive Exam:  Oriented to: AO4  Follows Commands/attention: Follows two-step commands  Communication: clear/fluent  Memory:  No Deficits noted  Safety awareness/insight to disability: impaired  Coping skills/emotional control: Labile and Despondent    Visual/perceptual:  Grossly intact    Physical Exam:  Postural examination/scapula alignment: Rounded shoulder, Head forward, Abnormal trunk flexion and Scoliosis  Skin integrity: Bruising of face, extremites  Edema: None noted     Sensation:   Grossly intact    Upper Extremity Range of Motion:  Right Upper Extremity: WFL  Left Upper Extremity: WFL    Upper Extremity Strength:  Right Upper Extremity: grossly 3/5 to 3+/5  Left Upper Extremity: grossly 3/5 to 3+/5   Strength: decreased    Fine motor coordination:   Grossly intact    Gross motor coordination: impaired    Functional Mobility:  Bed Mobility:  Rolling/Turning to Left: Stand by assistance  Rolling/Turning Right: Stand by assistance  Scooting/Bridging: Stand by Assistance  Supine to Sit: Stand by Assistance  Sit to Supine: Stand by Assistance    Transfers:  Sit <> Stand Assistance: Contact Guard Assistance  Sit <> Stand Assistive Device: Rolling Walker    Functional Ambulation: CGA w/RW ~100' w/4 standing rest breaks    Activities of Daily Living:    LE Dressing Level of Assistance: Moderate assistance  LE adaptive equipment:      Grooming Level of Assistance: Stand by assistance              Bathing adaptive equipment:     Balance:   Static Sit: GOOD-: Takes MODERATE challenges from all directions but inconsistently  Dynamic Sit: GOOD-: Maintains balance through MODERATE excursions of active trunk movement,     Static Stand: FAIR: Maintains without assist but unable to take challenges  Dynamic stand: FAIR: Needs CONTACT GUARD during gait    Therapeutic Activities and Exercises:  Pt/fam educated on DME, general home safety, fall prevention, sleep hygiene. Gait as  "above.    AM-PAC 6 CLICK ADL  How much help from another person does this patient currently need?  1 = Unable, Total/Dependent Assistance  2 = A lot, Maximum/Moderate Assistance  3 = A little, Minimum/Contact Guard/Supervision  4 = None, Modified [place]/Independent    Putting on and taking off regular lower body clothing? : 2  Bathing (including washing, rinsing, drying)?: 3  Toileting, which includes using toilet, bedpan, or urinal? : 3  Putting on and taking off regular upper body clothing?: 3  Taking care of personal grooming such as brushing teeth?: 3  Eating meals?: 3  Total Score: 17    AM-PAC Raw Score CMS "G-Code Modifier Level of Impairment Assistance   6 % Total / Unable   7 - 9 CM 80 - 100% Maximal Assist   10 - 14 CL 60 - 80% Moderate Assist   15 - 19 CK 40 - 60% Moderate Assist   20 - 22 CJ 20 - 40% Minimal Assist   23 CI 1-20% SBA / CGA   24 CH 0% Independent/ Mod I       Patient left HOB elevated with all lines intact, call button in reach, bed alarm on, nurse notified and daughter present    Assessment:  Tasha Hawley is a 61 y.o. female with a medical diagnosis of Syncope and collapse and presents with increased pain, decreased strength, balance, endurance, safety awareness limiting indep all needs.  Pt will benefit from skilled OT services however noted pt to discharge home today therefore OT to be discharged at s time..    Rehab identified problem list/impairments: Rehab identified problem list/impairments: weakness, gait instability, decreased upper extremity function, impaired endurance, impaired balance, decreased lower extremity function, decreased safety awareness, impaired muscle length, impaired self care skills, pain, impaired skin, impaired functional mobilty, decreased coordination    Rehab potential is fair.    Activity tolerance: Fair    Discharge recommendations: Discharge Facility/Level Of Care Needs: home health OT     Barriers to discharge: Barriers to " Discharge: Decreased caregiver support    Equipment recommendations: wheelchair     GOALS:   Occupational Therapy Goals     Not on file      Multidisciplinary Problems (Resolved)        Problem: Occupational Therapy Goal    Goal Priority Disciplines Outcome Interventions   Occupational Therapy Goal   (Resolved)     OT, PT/OT Outcome(s) achieved              PLAN:  Patient to be seen 5 x/week to address the above listed problems via self-care/home management, therapeutic activities, therapeutic exercises  Plan of Care expires: 04/03/17  Plan of Care reviewed with: patient, daughter    OT G-codes  Functional Assessment Tool Used: [facility]  Score: 17  Functional Limitation: Self care  Self Care Current Status ():   Self Care Goal Status ():   Self Care Discharge Status (): SONI Michele OT  03/03/2017

## 2017-03-03 NOTE — NURSING
Patient arrived to unit, resting in bed. Telemetry monitoring resumed. Vitals stable. Plan of care reviewed with patient and spouse. Will continue to monitor

## 2017-03-03 NOTE — ASSESSMENT & PLAN NOTE
Narcotic Dependency  Work injury about 20 years ago resulting in multiple back surgeries.  Has implantable Dilaudid pain pump that cannot be monitored or adjusted without special equipment as it is completely under the skin. It is managed by Dr. Hillman, Pain Management.  She also takes Norco 10 for breakthrough pain, which she uses less then once per day. Continue current regimen and monitor

## 2017-03-03 NOTE — PROGRESS NOTES
Please note the following patient has been assigned to Mel Cantrell RN CCM,  with Outpatient Complex Care Mgmt for screening.    Please contact [facility] at ext [phone] with questions.    Thank you       Jasmin Mccauley, SSC

## 2017-03-03 NOTE — PLAN OF CARE
Future Appointments  Date Time Provider Department Center   3/8/2017 1:30 PM [facility] [id number] 485 LB LIMIT [facility] XRAY OP [facility]   3/8/2017 2:20 PM ALEXY Taylor [facility] NEUROS7 [facility]   3/17/2017 10:00 AM Shireen Mayo MD [facility] UROLOGY [facility]   3/23/2017 10:00 AM Mesfin Hodges II, MD [facility] IM [facility] PCW   4/26/2017 12:00 PM Erik Oconnor MD [facility] PSYCH [facility]        03/03/17 1431   Discharge Assessment   Assessment Type Discharge Planning Assessment   Confirmed/corrected address and phone number on facesheet? Yes   Assessment information obtained from? Patient   Expected Length of Stay (days) 1   Communicated expected length of stay with patient/caregiver yes   If Healthcare Directive is received, is it scanned into Epic? no (comment)   Prior to hospitilization cognitive status: Alert/Oriented   Prior to hospitalization functional status: Independent;Assistive Equipment   Current cognitive status: Alert/Oriented   Current Functional Status: Independent;Assistive Equipment   Arrived From home or self-care;home health   Lives With spouse   Able to Return to Prior Arrangements no   How many people do you have in your home that can help with your care after discharge? 1   Who are your caregiver(s) and their phone number(s)?    Patient's perception of discharge disposition home health;home or selfcare   Readmission Within The Last 30 Days no previous admission in last 30 days   Patient currently being followed by outpatient case management? Yes   If yes, name of outpatient case management following: [facility] outpatient case management   Patient currently receives home health services? Yes  ([facility]- SN/PT/OT)   Patient previously received home health services and would like to resume services if necessary? Yes   Does the patient currently use HME? Yes   Patient currently receives private duty nursing? No   Patient currently receives any other outside agency services? No   Equipment  Currently Used at Home power chair;rollator;bath bench   Do you have any problems affording any of your prescribed medications? No   Is the patient taking medications as prescribed? yes   Do you have any financial concerns preventing you from receiving the healthcare you need? No   Does the patient have transportation to healthcare appointments? Yes   On Dialysis? No   Are there any open cases? No   Discharge Plan A Home;Home with family;Home Health   Discharge Plan B Home;Home with family   Patient/Family In Agreement With Plan yes     Jeaneth Caballero RN, CCM, CMSRN  RN Transition Navigator  [phone]

## 2017-03-03 NOTE — ASSESSMENT & PLAN NOTE
Patient has had up to 4 syncopal episodes since 2/1/17.  She has not been to see her pain management doctor and her pain pump continues to infuse at an unknown rate. THis is most likely medication related.   CT of the Head negative for intracranial processes.  Will get Echo, Trend Troponin , and Monitor on Telemetry to investigate cardiac causes of syncope.

## 2017-03-03 NOTE — NURSING
Reviewed discharge education with the pt. Pt was able to verbalize an understanding of the the education provided. Peripheral iv was removed with the tip intact. Insertion site covered with gauze and coban.  Tele was removed from the pt, cleaned and returned to nurses station. Pt waiting on transportation to main [facility].

## 2017-03-03 NOTE — CONSULTS
Consulted for Unna Boots. Removed dressings. Legs appear intact with redness circumferentially to lower legs. Cleaned with warm water and patted dry. Applied Dynaflex 3 layer dressing with adaptic gauze to reddened areas. Secured edges per patient request with medipore tape. Encouraged patient to make f/u appointment with MD that applies new leg dressings. Pt has HH nurse already. States that dressings usually changed Q 3 days. I told her to stick to whatever schedule her regular wound MD told her to be on since her legs are currently without breakdown.    Swelling to bilateral lower legs with left leg slightly larger than right. Pain with palpation. No open wounds.     Posterior left leg.     Posterior right leg.     Third Toe and Second Toe Right Foot with healed ulcers. Padded with rodrigo foam and secured with medipore tape. Applied new pads around bony prominences to bilateral plantar foot prior to re-wrapping.

## 2017-03-03 NOTE — DISCHARGE SUMMARY
[facility] Hospital Medicine  Discharge Summary      Patient Name: Tasha Hawley  MRN: [id number]  Admission Date: 3/2/2017  Hospital Length of Stay: 0 days  Discharge Date and Time:  03/03/2017 3:03 PM  Attending Physician: Kwame Concepcion MD   Discharging Provider: Nurys Khan NP  Primary Care Provider: Mesfin Hodges Ii, MD      HPI:   Tasha Hawley is a 61 year  female with significant past medical history of CAD, CHF, chronic back pain and neurogenic bladder with suprapubic catheter, GERD, Iron Deficiency Anemia, Anxiety and Depression. She experienced a work related injury about 20 years ago which resulted in multiple back surgeries. 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  She has a SQ Dilaudid Pain Pump that is completely under the skin and dosage cannot be monitored or adjusted without special equipment. It is management by Dr. Hillman, Pain Management. Her PCP is Dr. Mesfin Hodges and her urologist is Dr. Mary Griffin.     Patient presented to the [facility] ED after having a syncopal episode at home this morning.  The patient reportedly has had 2 syncopal episodes in a matter of 4 days.  She was admitted to [facility] on 2/2/17 for Syncope that was found to medication overdose.  Upon arrival to the ED, was  disoriented also seemed to be slurring  Slightly, probably  overmedication.  Urine was negative her drug screen was positive for opiates and barbiturates her urine sample was negative for infection or neck enzymes were negative.  Electrolytes WNL.  CT of the cervical spine with no evidence of acute fracture, but with multilevel cervical spine DJD.  CT of the head showed a new large area of left temporal parietal subcutaneous soft tissue thickening and the unchanged smaller right frontal subcutaneous soft tissue thickening/hematoma.  Patient has no intracranial abnormalities.  Orthostatics were negative.  Patient has old  ecchymosis to the right lauro with raccoon eyes bilaterally.  Patient has a laceration with hematoma to the left temporal area which is tender to palpation.     Patient is admitted to [facility] Medicine syncope with telemtry monitoring.  Will trend troponin and get echo.  She is on Plavix, but is not sure why.           Indwelling Lines/Drains at time of discharge:   Lines/Drains/Airways     Drain                 Suprapubic Catheter latex -- days         Urethral Catheter 11/08/16 1239 Straight-tip;Non-latex 22 Fr. 115 days              Hospital Course:   Troponin negative times 3.  Echocardiogram shows improvement of EF 55% (prior EF 40%) and resolution of Pulmonary Hypertension.  There has been no arrhythmias and Telemetry.  Patient worked with PT and OT, tolerated regular diet.  Spoke with ALEXY Gonzales with Dr. Hillman (Pain Management) about concerns of medication induced syncope.  Appointment made in their office for 3/6/17 3:15pm.  She will be discharged today with family.  I am holding her plavix, lasix, and potassium until her medication regimen can be adjusted by her out-patient providers, due to her multiple syncope and collapse episodes.. She is to be seen also by her PCP, Dr. Greene in 1 week.   Wheelchair ordered for home use per PT/OT recommendations.  Will resume prior PT/OT Home Health.      Consults:     Significant Diagnostic Studies: Labs:   CMP   Recent Labs  Lab 03/02/17  1608 03/03/17  0720    140   K 3.7 3.8    104   CO2 33* 31*   GLU 81 137*   BUN 7 8   CREATININE 0.8 0.9   CALCIUM 9.3 8.6*   PROT 6.8  --    ALBUMIN 3.5  --    BILITOT 0.4  --    ALKPHOS 108  --    AST 16  --    ALT 12  --    ANIONGAP 7* 5*   ESTGFRAFRICA >60 >60   EGFRNONAA >60 >60   , CBC   Recent Labs  Lab 03/02/17  1608 03/03/17  0720   WBC 4.79 3.79*   HGB 10.1* 9.3*   HCT 32.9* 31.1*    188   , INR   Lab Results   Component Value Date    INR 0.9 05/27/2014    INR 1.0 05/01/2013    INR 0.9  06/23/2012    and Troponin   Recent Labs  Lab 03/03/17  1114   TROPONINI <0.006     Imaging Results         CT Cervical Spine Without Contrast (Final result) Result time:  03/02/17 16:34:39    Final result by Lamont Berg MD (03/02/17 16:34:39)    Impression:      No evidence of acute cervical spine fracture or dislocation.    Multilevel cervical spine DJD with postsurgical changes of ACDF C4-C7.      Electronically signed by: LAMONT BERG MD  Date:     03/02/17  Time:    16:34     Narrative:    Clinical indication: 61 year-old female status post fall.    Comparison: None.    Technique: 2-mm axial images were obtained through the cervical spine without the use of IV contrast.  Coronal and sagittal reformats are also available for review.    Findings:  No evidence of acute cervical spine fracture or dislocation.  Postsurgical changes visualized consistent with anterior cervical discectomy and fusion at the C4-C7 levels.  Hardware appears intact with no complication seen.  Odontoid process is intact.  Vertebral body heights are maintained without evidence of fracture.  Prominent posterior disc osteophyte complex is seen at the C3-4 level.  There is multilevel degenerative change, facet arthropathy, and neuroforaminal narrowing.  Craniocervical junction is unremarkable.  Mild spinal canal stenosis is visualized at C3-4.    Surrounding soft tissues show no significant abnormalities.  Airway is patent.  Visualized lung apices are clear.            CT Head Without Contrast (Final result) Result time:  03/02/17 16:25:52    Final result by Mralee Abebe MD (03/02/17 16:25:52)    Impression:        New large area of left temporoparietal subcutaneous soft tissue thickening and unchanged small area of right frontal subcutaneous soft tissue thickening. No fracture or intracranial hemorrhage.      Electronically signed by: MARLEE ABEBE  Date:     03/02/17  Time:    16:25     Narrative:    HISTORY:  fall    TECHNIQUE: 5 mm  axial images were acquired from the vertex to the skullbase, without administration of contrast. Multiplanar reconstructions were provided for review.    COMPARISON: 3/1/17    FINDINGS:    Brain and intracranial structures: There is no mass lesion, hemorrhage, or acute infarct. Gray-white differentiation is preserved. Ventricles and sulci are normal for age.     Skull:  Normal.    Scalp: Unchanged small area of right frontal subcutaneous soft tissue thickening. New large left temporoparietal subcutaneous soft tissue thickening    Orbits and face (included portions): Normal.    Paranasal sinuses and mastoid air cells (included portions): Normal.            Results for orders placed or performed during the hospital encounter of 03/02/17   2D echo with color flow doppler   Result Value Ref Range    EF 55 55 - 65    Mitral Valve Regurgitation TRIVIAL     Diastolic Dysfunction No     Est. PA Systolic Pressure 38.25     Mitral Valve Mobility NORMAL     Tricuspid Valve Regurgitation TRIVIAL    ]    Pending Diagnostic Studies:     None        Final Active Diagnoses:    Diagnosis Date Noted POA    PRINCIPAL PROBLEM:  Syncope and collapse [R55] 03/02/2017 Yes    Lymphedema of both lower extremities [I89.0] 03/02/2017 Unknown    Primary hypothyroidism [E03.9] 02/02/2017 Yes    Weight loss, unintentional [R63.4] 02/01/2017 Yes    Fall [W19.XXXA] 02/01/2017 Yes    Urinary retention [R33.9] 12/21/2016 Yes    Neurogenic bladder [N31.9] 09/27/2016 Yes    GERD (gastroesophageal reflux disease) [K21.9] 08/16/2016 Yes    Combined systolic and diastolic cardiac dysfunction [I51.89] 08/16/2016 Yes    Narcotic dependency, continuous [F11.20] 07/18/2014 Yes    Chronic pain [G89.29] 05/01/2013 Yes    Major depressive disorder, single episode [F32.9] 02/21/2013 Yes      Problems Resolved During this Admission:    Diagnosis Date Noted Date Resolved POA      * Syncope and collapse  Fall  Patient has had up to 4 syncopal episodes  since 2/1/17.  She has not been to see her pain management doctor and her pain pump continues to infuse at an unknown rate.  CT of the Head negative for intracranial processes.  Cardiac Workup negative. THis is most likely medication related.     Spoke with ALEXY Viera with Dr. Hillman for concerns of multiple syncopal episodes.  She has an appointment 3/6/17 for evaluation of medication adjustments.       Major depressive disorder, single episode  Anxiety  Continue Bupropion  mg daily and Fluoxetine 40 mg daily.    Chronic pain  Narcotic Dependency  Work injury about 20 years ago resulting in multiple back surgeries.  Has implantable Dilaudid pain pump that cannot be monitored or adjusted without special equipment as it is completely under the skin. It is managed by Dr. Hillman, Pain Management.  She also takes Norco 10 for breakthrough pain, which she uses less then once per day. Follow up with pain management on 3/6/17, as scheduled    Combined systolic and diastolic cardiac dysfunction  Lymphedema of Bilateral Lower Extremeties  -Holding home furosemide 40 mg daily, Potassium 20 MEQ daiily, Plavix 75 mg daily d/t Traumatic Syncope  -Resume ASA 81 mg daily   -3/3/17 Echo shows EF 55%.  Improved from prior Echo  -Troponin negative times 3  -Please follow up with PCP on resumption of Diuretics and Plavix    GERD (gastroesophageal reflux disease)  Continue home pantoprazole 40 mg daily      Neurogenic bladder  Urinary Retention  Secondary to sever Lumbar disease.  She has a suprapubic catheter. Urinalysis clean. Followed by Dr. Griffin.       Weight loss, unintentional  She has experienced 30-40 pound weight loss. Chronic debility and depression.  Please follow up with PCP.      Primary hypothyroidism  Continue levothyroxine 100 mcg daily        Discharged Condition: fair    Disposition: Home or Self Care    Follow Up:  Follow-up Information     Follow up with Mesfin Hodges Ii, MD In 1 week.    Specialty:  " Internal Medicine    Contact information:    [address]  [facility] [address]  [phone]          Follow up with Nigel Hillman MD On 3/6/2017.    Specialty:  Pain Medicine    Why:  3:15 pm Appointment    Contact information:    [address]  [address]  [facility] [address]  [phone]          Patient Instructions:     WHEELCHAIR FOR HOME USE   Order Specific Question Answer Comments   Hours in W/C per day: 12    Type of Wheelchair: Standard    Size(Width): 16"(small adult)    Leg Support: STD footrests    Lap Belt: Velcro    Cushion: Basic    Height: 5' 2" (1.575 m)    Weight: 73.4 kg (161 lb 13.1 oz)    Does patient have medical equipment at home? rollator    Does patient have medical equipment at home? walker, rolling    Does patient have medical equipment at home? bedside commode    Does patient have medical equipment at home? bath bench    Length of need (1-99 months): 99    Please check all that apply: Caregiver is capable and willing to operate wheelchair safely.      Ambulatory referral to Outpatient Case Management   Referral Priority: Routine Referral Type: Consultation   Referral Reason: Specialty Services Required    Number of Visits Requested: 1      Diet general   Order Specific Question Answer Comments   Additional restrictions: Cardiac (Low Na/Chol)      Activity as tolerated       Medications:  Reconciled Home Medications:   Current Discharge Medication List      CONTINUE these medications which have NOT CHANGED    Details   atorvastatin (LIPITOR) 80 MG tablet Take 1 tablet (80 mg total) by mouth once daily.  Qty: 90 tablet, Refills: 3      buPROPion (WELLBUTRIN XL) 150 MG TB24 tablet Take 2 tablets (300 mg total) by mouth once daily.  Qty: 60 tablet, Refills: 2      BUTALBITAL/ASPIRIN/CAFFEINE (FIORINAL ORAL) Take by mouth as needed.      docusate sodium (COLACE) 100 MG capsule Take 1 capsule (100 mg total) by mouth 3 (three) times daily as needed for Constipation.  Qty: 90 " capsule, Refills: 3      doxycycline (MONODOX) 100 MG capsule       fluoxetine (PROZAC) 20 MG capsule Take 2 capsules (40 mg total) by mouth once daily.  Qty: 60 capsule, Refills: 2      levothyroxine (SYNTHROID) 100 MCG tablet Take 1 tablet (100 mcg total) by mouth before breakfast.  Qty: 90 tablet, Refills: 1    Associated Diagnoses: Hypothyroidism, unspecified type      polyethylene glycol (GLYCOLAX) 17 gram PwPk Take 17 g by mouth 2 (two) times daily.  Qty: 30 packet, Refills: 5    Associated Diagnoses: Chronic constipation      alprazolam (XANAX) 0.25 MG tablet Take 1 tablet (0.25 mg total) by mouth once daily.  Qty: 14 tablet, Refills: 0    Associated Diagnoses: Anxiety      aspirin (ECOTRIN) 81 MG EC tablet Take 1 tablet (81 mg total) by mouth once daily.  Refills: 0      hydrocodone-acetaminophen 10-325mg (NORCO)  mg Tab Take 1 tablet by mouth every 6 (six) hours as needed.  Qty: 61 tablet, Refills: 0      hyoscyamine (ANASPAZ,LEVSIN) 0.125 mg Tab TAKE 1 TABLET (125 MCG TOTAL) BY MOUTH EVERY 6 (SIX) HOURS AS NEEDED.  Qty: 120 tablet, Refills: 0    Associated Diagnoses: Bladder spasm      ondansetron (ZOFRAN) 8 MG tablet Take 1 tablet (8 mg total) by mouth every 12 (twelve) hours as needed for Nausea.  Qty: 60 tablet, Refills: 3    Associated Diagnoses: Nausea      pantoprazole (PROTONIX) 40 MG tablet Take 1 tablet (40 mg total) by mouth once daily.  Qty: 90 tablet, Refills: 3      SENNOSIDES (SENNA LAX ORAL) Take by mouth as needed.         STOP taking these medications       clopidogrel (PLAVIX) 75 mg tablet Comments:   Reason for Stopping:         furosemide (LASIX) 40 MG tablet Comments:   Reason for Stopping:         potassium chloride SA (K-DUR,KLOR-CON) 20 MEQ tablet Comments:   Reason for Stopping:         lamotrigine (LAMICTAL) 25 MG tablet Comments:   Reason for Stopping:         ranitidine (ZANTAC) 150 MG capsule Comments:   Reason for Stopping:             Time spent on the discharge of  patient: 45 minutes    Nurys Khan NP  Department of Hospital Medicine  [facility]

## 2017-03-03 NOTE — PLAN OF CARE
[facility] HOME HEALTH ORDERS  FACE TO FACE ENCOUNTER    Patient Name: Tasha Hawley  YOB: 1955    PCP: Mesfin Hodges Ii, MD   PCP Address: [address] / [place] [address]  PCP Phone Number: [phone]  PCP Fax: [phone]    Encounter Date: 03/03/2017    Admit to Home Health    Diagnoses:  Active Hospital Problems    Diagnosis  POA    *Syncope and collapse [R55]  Yes    Lymphedema of both lower extremities [I89.0]  Unknown    Primary hypothyroidism [E03.9]  Yes    Weight loss, unintentional [R63.4]  Yes    Fall [W19.XXXA]  Yes    Urinary retention [R33.9]  Yes    Neurogenic bladder [N31.9]  Yes    GERD (gastroesophageal reflux disease) [K21.9]  Yes    Combined systolic and diastolic cardiac dysfunction [I51.89]  Yes    Narcotic dependency, continuous [F11.20]  Yes    Chronic pain [G89.29]  Yes    Major depressive disorder, single episode [F32.9]  Yes      Resolved Hospital Problems    Diagnosis Date Resolved POA   No resolved problems to display.       Future Appointments  Date Time Provider Department Center   3/8/2017 1:30 PM [facility] [id number] 485 LB LIMIT [facility] XRAY OP [facility]   3/8/2017 2:20 PM ALEXY Taylor [facility] NEUROS7 [facility]   3/17/2017 10:00 AM Shireen Mayo MD [facility] UROLOGY [facility]   3/23/2017 10:00 AM Mesfin Hodges II, MD [facility] IM [facility] PCW   4/26/2017 12:00 PM Erik Oconnor MD [facility] PSYCH [facility]     Follow-up Information     Follow up with Mesfin Hodges Ii, MD In 1 week.    Specialty:  Internal Medicine    Contact information:    [address]  [facility] [address]  [phone]          Follow up with Nigel Hillman MD On 3/6/2017.    Specialty:  Pain Medicine    Why:  3:15 pm Appointment    Contact information:    [address]  [address]  [facility] [address]  [phone]              I have seen and examined this patient face to face today. My clinical findings that support the need for the home health  skilled services and home bound status are the following:  Requiring assistive device to leave home due to unsteady gait caused by  Weakness/Debility.    Allergies:  Review of patient's allergies indicates:   Allergen Reactions    Imitrex [sumatriptan succinate] Shortness Of Breath    Penicillins Shortness Of Breath     Other reaction(s): Jittery    Percocet [oxycodone-acetaminophen] Anaphylaxis    Topamax [topiramate] Shortness Of Breath    Vancomycin Shortness Of Breath     Rash    (d)-limonene flavor      Other reaction(s): difficult intubation  Other reaction(s): Jittery  Other reaction(s): Difficulty breathing  Other reaction(s): Difficulty breathing  Other reaction(s): Jittery  Other reaction(s): Difficulty breathing    Bactrim [sulfamethoxazole-trimethoprim] Nausea And Vomiting     Other reaction(s): Resp Depression  Other reaction(s): Anaphylaxis    Butorphanol tartrate     Darvocet a500 [propoxyphene n-acetaminophen]      Other reaction(s): Jittery  Other reaction(s): Difficulty breathing    Fentanyl      Other reaction(s): Vomiting  Other reaction(s): Nausea  Other reaction(s): Itching  swelling    Phenytoin sodium extended      pATIENT DENIES EVER HAVING THIS MEDICATION    White petrolatum-zinc     Zinc oxide-white petrolatum      Other reaction(s): Difficulty breathing    Latex, natural rubber Itching and Rash       Diet: cardiac diet    Activities: activity as tolerated    Nursing:   SN to complete comprehensive assessment including routine vital signs. Instruct on disease process and s/s of complications to report to MD. Review/verify medication list sent home with the patient at time of discharge  and instruct patient/caregiver as needed. Frequency may be adjusted depending on start of care date.    Notify MD if SBP > 160 or < 90; DBP > 90 or < 50; HR > 120 or < 50; Temp > 101; Other:         CONSULTS:    Physical Therapy to evaluate and treat. Evaluate for home safety and equipment  needs; Establish/upgrade home exercise program. Perform / instruct on therapeutic exercises, gait training, transfer training, and Range of Motion.  Occupational Therapy to evaluate and treat. Evaluate home environment for safety and equipment needs. Perform/Instruct on transfers, ADL training, ROM, and therapeutic exercises.        Medications: Review discharge medications with patient and family and provide education.      Current Discharge Medication List      CONTINUE these medications which have NOT CHANGED    Details   atorvastatin (LIPITOR) 80 MG tablet Take 1 tablet (80 mg total) by mouth once daily.  Qty: 90 tablet, Refills: 3      buPROPion (WELLBUTRIN XL) 150 MG TB24 tablet Take 2 tablets (300 mg total) by mouth once daily.  Qty: 60 tablet, Refills: 2      BUTALBITAL/ASPIRIN/CAFFEINE (FIORINAL ORAL) Take by mouth as needed.      docusate sodium (COLACE) 100 MG capsule Take 1 capsule (100 mg total) by mouth 3 (three) times daily as needed for Constipation.  Qty: 90 capsule, Refills: 3      doxycycline (MONODOX) 100 MG capsule       fluoxetine (PROZAC) 20 MG capsule Take 2 capsules (40 mg total) by mouth once daily.  Qty: 60 capsule, Refills: 2      levothyroxine (SYNTHROID) 100 MCG tablet Take 1 tablet (100 mcg total) by mouth before breakfast.  Qty: 90 tablet, Refills: 1    Associated Diagnoses: Hypothyroidism, unspecified type      polyethylene glycol (GLYCOLAX) 17 gram PwPk Take 17 g by mouth 2 (two) times daily.  Qty: 30 packet, Refills: 5    Associated Diagnoses: Chronic constipation      alprazolam (XANAX) 0.25 MG tablet Take 1 tablet (0.25 mg total) by mouth once daily.  Qty: 14 tablet, Refills: 0    Associated Diagnoses: Anxiety      aspirin (ECOTRIN) 81 MG EC tablet Take 1 tablet (81 mg total) by mouth once daily.  Refills: 0      hydrocodone-acetaminophen 10-325mg (NORCO)  mg Tab Take 1 tablet by mouth every 6 (six) hours as needed.  Qty: 61 tablet, Refills: 0      hyoscyamine  (ANASPAZ,LEVSIN) 0.125 mg Tab TAKE 1 TABLET (125 MCG TOTAL) BY MOUTH EVERY 6 (SIX) HOURS AS NEEDED.  Qty: 120 tablet, Refills: 0    Associated Diagnoses: Bladder spasm      ondansetron (ZOFRAN) 8 MG tablet Take 1 tablet (8 mg total) by mouth every 12 (twelve) hours as needed for Nausea.  Qty: 60 tablet, Refills: 3    Associated Diagnoses: Nausea      pantoprazole (PROTONIX) 40 MG tablet Take 1 tablet (40 mg total) by mouth once daily.  Qty: 90 tablet, Refills: 3      SENNOSIDES (SENNA LAX ORAL) Take by mouth as needed.         STOP taking these medications       clopidogrel (PLAVIX) 75 mg tablet Comments:   Reason for Stopping:         furosemide (LASIX) 40 MG tablet Comments:   Reason for Stopping:         potassium chloride SA (K-DUR,KLOR-CON) 20 MEQ tablet Comments:   Reason for Stopping:         lamotrigine (LAMICTAL) 25 MG tablet Comments:   Reason for Stopping:         ranitidine (ZANTAC) 150 MG capsule Comments:   Reason for Stopping:               I certify that this patient is confined to her home and needs intermittent skilled nursing care, physical therapy and occupational therapy.

## 2017-03-03 NOTE — PROGRESS NOTES
Received electronic request for refills of Xanax and antidepressants.  Pt currently admitted to [facility] after a syncopal episode and fall.  I refused refills because work up in progress.

## 2017-03-03 NOTE — PLAN OF CARE
Problem: Occupational Therapy Goal  Goal: Occupational Therapy Goal  Outcome: Outcome(s) achieved Date Met:  03/03/17  OT jo ann performed, report in progress     NOted pt to be discharged home today, therefore no goals set.     Pt will benefit from  and  therapy.

## 2017-03-03 NOTE — PROGRESS NOTES
[facility] Medicine  Progress Note    Patient Name: Tasha Hawley  MRN: [id number]  Patient Class: OP- Observation   Admission Date: 3/2/2017  Length of Stay: 0 days  Attending Physician: Kwame Concepcion MD  Primary Care Provider: Mesfin Hodges Ii, MD        Subjective:     Principal Problem:Syncope and collapse    HPI:  Tasha Hawley is a 61 year  female with significant past medical history of CAD, CHF, chronic back pain and neurogenic bladder with suprapubic catheter, GERD, Iron Deficiency Anemia, Anxiety and Depression. She experienced a work related injury about 20 years ago which resulted in multiple back surgeries. 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  She has a SQ Dilaudid Pain Pump that is completely under the skin and dosage cannot be monitored or adjusted without special equipment. It is management by Dr. Hillman, Pain Management. Her PCP is Dr. Mesfin Hodges and her urologist is Dr. Mary Griffin.     Patient presented to the [facility] ED after having a syncopal episode at home this morning.  The patient reportedly has had 2 syncopal episodes in a matter of 4 days.  She was admitted to [facility] on 2/2/17 for Syncope that was found to medication overdose.  Upon arrival to the ED, was  disoriented also seemed to be slurring  Slightly, probably  overmedication.  Urine was negative her drug screen was positive for opiates and barbiturates her urine sample was negative for infection or neck enzymes were negative.  Electrolytes WNL.  CT of the cervical spine with no evidence of acute fracture, but with multilevel cervical spine DJD.  CT of the head showed a new large area of left temporal parietal subcutaneous soft tissue thickening and the unchanged smaller right frontal subcutaneous soft tissue thickening/hematoma.  Patient has no intracranial abnormalities.  Orthostatics were negative.  Patient has old ecchymosis to the  right lauro with raccoon eyes bilaterally.  Patient has a laceration with hematoma to the left temporal area which is tender to palpation.     Patient is admitted to [facility] Medicine syncope with telemtry monitoring.  Will trend troponin and get echo.  She is on Plavix, but is not sure why.              Hospital Course:  Troponin negative times 3.  Echocardiogram shows improvement of EF 55% (prior EF 40%) and resolution of Pulmonary Hypertension.  There has been no arrhythmias and Telemetry.  Patient worked with PT and OT, tolerated regular diet.  Spoke with ALEXY Gonzales with Dr. Hillman (Pain Management) about concerns of medication induced syncope.  Appointment made in their office for 3/6/17 3:15pm.  She will be discharged today with family.  I am holding her plavix, lasix, and potassium until her medication regimen can be adjusted by her out-patient providers, due to her multiple syncope and collapse episodes.. She is to be seen also by her PCP, Dr. Greene in 1 week.       Review of Systems   Constitutional: Positive for unexpected weight change (30-40 pound unitentional wieght loss). Negative for chills, diaphoresis and fever.   HENT: Negative for sore throat and trouble swallowing.    Eyes: Negative for photophobia and visual disturbance.   Respiratory: Negative for cough, shortness of breath and wheezing.    Cardiovascular: Positive for leg swelling (Lymphedema). Negative for chest pain and palpitations.   Gastrointestinal: Positive for constipation. Negative for abdominal distention, abdominal pain, diarrhea, nausea and vomiting.   Endocrine: Negative for polydipsia and polyphagia.   Genitourinary: Negative for decreased urine volume and dysuria.        Suprapubic Catheter secondary to neurogenic bladder   Musculoskeletal: Negative for joint swelling, neck pain and neck stiffness.   Neurological: Positive for syncope, facial asymmetry (Right lip droop since birst) and headaches. Negative for  weakness and numbness.   Psychiatric/Behavioral: Positive for dysphoric mood and sleep disturbance. Negative for agitation and suicidal ideas. The patient is nervous/anxious.      Objective:     Vital Signs (Most Recent):  Temp: 97.9 °F (36.6 °C) (03/03/17 1200)  Pulse: (!) 49 (03/03/17 1200)  Resp: 16 (03/03/17 1200)  BP: (!) 101/58 (03/03/17 1200)  SpO2: 99 % (03/03/17 1158) Vital Signs (24h Range):  Temp:  [97.5 °F (36.4 °C)-98.2 °F (36.8 °C)] 97.9 °F (36.6 °C)  Pulse:  [49-64] 49  Resp:  [16-18] 16  SpO2:  [95 %-100 %] 99 %  BP: ()/(42-71) 101/58     Weight: 73.4 kg (161 lb 13.1 oz)  Body mass index is 29.6 kg/(m^2).    Intake/Output Summary (Last 24 hours) at 03/03/17 1452  Last data filed at 03/03/17 0800   Gross per 24 hour   Intake              600 ml   Output             1300 ml   Net             -700 ml      Physical Exam   Constitutional: She is oriented to person, place, and time. She appears cachectic. She has a sickly appearance. She appears distressed.   Ecchymosis to the right lauro with raccoon eyes bilaterally.  Laceration with hematoma to the left temporal area which is tender to palpation.      HENT:   Mouth/Throat: Oropharynx is clear and moist. No oropharyngeal exudate.   Eyes: Conjunctivae and EOM are normal. Pupils are equal, round, and reactive to light. No scleral icterus.   Neck: Neck supple.   Cardiovascular: Normal rate, regular rhythm, normal heart sounds and intact distal pulses.  Exam reveals no gallop and no friction rub.    No murmur heard.  Pulmonary/Chest: Effort normal and breath sounds normal. No respiratory distress. She has no wheezes. She has no rales.   Abdominal: Soft. Bowel sounds are normal. She exhibits no distension. There is no tenderness. There is no rebound and no guarding.   Implantable Pump RUQ   Genitourinary:   Genitourinary Comments: Supra-Pubic Catheter Draining yellow urine   Musculoskeletal: She exhibits no edema, tenderness or deformity.    Neurological: She is alert and oriented to person, place, and time. She exhibits normal muscle tone.   Right droop of lip   Skin: Skin is warm and dry. No rash noted. She is not diaphoretic.   Psychiatric: Her mood appears anxious. She exhibits a depressed mood.   Nursing note and vitals reviewed.      Significant Labs:   CBC:   Recent Labs  Lab 03/02/17  1608 03/03/17  0720   WBC 4.79 3.79*   HGB 10.1* 9.3*   HCT 32.9* 31.1*    188     CMP:   Recent Labs  Lab 03/02/17  1608 03/03/17  0720    140   K 3.7 3.8    104   CO2 33* 31*   GLU 81 137*   BUN 7 8   CREATININE 0.8 0.9   CALCIUM 9.3 8.6*   PROT 6.8  --    ALBUMIN 3.5  --    BILITOT 0.4  --    ALKPHOS 108  --    AST 16  --    ALT 12  --    ANIONGAP 7* 5*   EGFRNONAA >60 >60     Troponin:   Recent Labs  Lab 03/02/17  2345 03/03/17  0720 03/03/17  1114   TROPONINI <0.006 <0.006 <0.006       Significant Imaging:   Imaging Results         CT Cervical Spine Without Contrast (Final result) Result time:  03/02/17 16:34:39    Final result by Lamont Berg MD (03/02/17 16:34:39)    Impression:      No evidence of acute cervical spine fracture or dislocation.    Multilevel cervical spine DJD with postsurgical changes of ACDF C4-C7.      Electronically signed by: LAMONT BERG MD  Date:     03/02/17  Time:    16:34     Narrative:    Clinical indication: 61 year-old female status post fall.    Comparison: None.    Technique: 2-mm axial images were obtained through the cervical spine without the use of IV contrast.  Coronal and sagittal reformats are also available for review.    Findings:  No evidence of acute cervical spine fracture or dislocation.  Postsurgical changes visualized consistent with anterior cervical discectomy and fusion at the C4-C7 levels.  Hardware appears intact with no complication seen.  Odontoid process is intact.  Vertebral body heights are maintained without evidence of fracture.  Prominent posterior disc osteophyte complex  is seen at the C3-4 level.  There is multilevel degenerative change, facet arthropathy, and neuroforaminal narrowing.  Craniocervical junction is unremarkable.  Mild spinal canal stenosis is visualized at C3-4.    Surrounding soft tissues show no significant abnormalities.  Airway is patent.  Visualized lung apices are clear.            CT Head Without Contrast (Final result) Result time:  03/02/17 16:25:52    Final result by Marlee Patel MD (03/02/17 16:25:52)    Impression:        New large area of left temporoparietal subcutaneous soft tissue thickening and unchanged small area of right frontal subcutaneous soft tissue thickening. No fracture or intracranial hemorrhage.      Electronically signed by: MARLEE PATEL  Date:     03/02/17  Time:    16:25     Narrative:    HISTORY:  fall    TECHNIQUE: 5 mm axial images were acquired from the vertex to the skullbase, without administration of contrast. Multiplanar reconstructions were provided for review.    COMPARISON: 3/1/17    FINDINGS:    Brain and intracranial structures: There is no mass lesion, hemorrhage, or acute infarct. Gray-white differentiation is preserved. Ventricles and sulci are normal for age.     Skull:  Normal.    Scalp: Unchanged small area of right frontal subcutaneous soft tissue thickening. New large left temporoparietal subcutaneous soft tissue thickening    Orbits and face (included portions): Normal.    Paranasal sinuses and mastoid air cells (included portions): Normal.            Echocardiogram 3/3/2017  CONCLUSIONS     1 - Normal left ventricular systolic function (EF 55-60%).     2 - Normal left ventricular diastolic function.     3 - Normal right ventricular systolic function .     4 - The estimated PA systolic pressure is 38 mmHg.     5 - Intermediate central venous pressure.             This document has been electronically    SIGNED BY: Palomo Greene MD On: 03/03/2017 13:07             Assessment/Plan:      * Syncope and  collapse  Fall  Patient has had up to 4 syncopal episodes since 2/1/17.  She has not been to see her pain management doctor and her pain pump continues to infuse at an unknown rate.  CT of the Head negative for intracranial processes.  Cardiac Workup negative. THis is most likely medication related.     Spoke with ALEXY Viera with Dr. Hillman for concerns of multiple syncopal episodes.  She has an appointment 3/6/17 for evaluation of medication adjustments.       Major depressive disorder, single episode  Anxiety  Continue Bupropion  mg daily and Fluoxetine 40 mg daily.    Chronic pain  Narcotic Dependency  Work injury about 20 years ago resulting in multiple back surgeries.  Has implantable Dilaudid pain pump that cannot be monitored or adjusted without special equipment as it is completely under the skin. It is managed by Dr. Hillman, Pain Management.  She also takes Norco 10 for breakthrough pain, which she uses less then once per day. Follow up with pain management on 3/6/17, as scheduled    Combined systolic and diastolic cardiac dysfunction  Lymphedema of Bilateral Lower Extremeties  -Holding home furosemide 40 mg daily, Potassium 20 MEQ daiily, Plavix 75 mg daily d/t Traumatic Syncope  -Resume ASA 81 mg daily   -3/3/17 Echo shows EF 55%.  Improved from prior Echo  -Troponin negative times 3      GERD (gastroesophageal reflux disease)  Continue home pantoprazole 40 mg daily      Neurogenic bladder  Urinary Retention  Secondary to sever Lumbar disease.  She has a suprapubic catheter. Urinalysis clean. Followed by Dr. Griffin.       Weight loss, unintentional  She has experienced 30-40 pound weight loss. Chronic debility and depression.  Please follow up with PCP.      Primary hypothyroidism  Continue levothyroxine 100 mcg daily      VTE Risk Mitigation         Ordered     enoxaparin injection 40 mg  Daily     Route:  Subcutaneous        03/02/17 2041     Medium Risk of VTE  Once      03/02/17 2041           Nurys Khan NP  Department of [facility] Medicine   [facility]

## 2017-03-03 NOTE — H&P
[facility] Medicine  History & Physical    Patient Name: Tasha Hawley  MRN: [id number]  Admission Date: 3/2/2017  Attending Physician: Kwame Concepcion MD   Primary Care Provider: Mesfin Hodges Ii, MD         Patient information was obtained from patient, spouse/SO, past medical records and ER records.     Subjective:     Principal Problem:Syncope and collapse    Chief Complaint:   Chief Complaint   Patient presents with    Fall     Fell at home today, hitting L forehead, states also fell on Saturday hitting R forehead with bruising.         HPI: Tasha Hawley is a 61 year  female with significant past medical history of CAD, CHF, chronic back pain and neurogenic bladder with suprapubic catheter, GERD, Iron Deficiency Anemia, Anxiety and Depression. She experienced a work related injury about 20 years ago which resulted in multiple back surgeries. 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  She has a SQ Dilaudid Pain Pump that is completely under the skin and dosage cannot be monitored or adjusted without special equipment. It is management by Dr. Hillman, Pain Management. Her PCP is Dr. Mesfin Hodges and her urologist is Dr. Mary Griffin.     Patient presented to the [facility] ED after having a syncopal episode at home this morning.  The patient reportedly has had 2 syncopal episodes in a matter of 4 days.  She was admitted to [facility] on 2/2/17 for Syncope that was found to medication overdose.  Upon arrival to the ED, was  disoriented also seemed to be slurring  Slightly, probably  overmedication.  Urine was negative her drug screen was positive for opiates and barbiturates her urine sample was negative for infection or neck enzymes were negative.  Electrolytes WNL.  CT of the cervical spine with no evidence of acute fracture, but with multilevel cervical spine DJD.  CT of the head showed a new large area of left temporal parietal  subcutaneous soft tissue thickening and the unchanged smaller right frontal subcutaneous soft tissue thickening/hematoma.  Patient has no intracranial abnormalities.  Orthostatics were negative.  Patient has old ecchymosis to the right lauro with raccoon eyes bilaterally.  Patient has a laceration with hematoma to the left temporal area which is tender to palpation.     Patient is admitted to [facility] Medicine syncope with telemtry monitoring.  Will trend troponin and get echo.  She is on Plavix, but is not sure why.              Past Medical History:   Diagnosis Date    Allergy     Anxiety     Arthritis     Carotid artery occlusion     Cataract     Depression     Edema     chronic    Fever blister     Hypothyroid     Iron deficiency anemia     Joint pain     Lumbar radiculopathy     with chronic pain    Ocular migraine     Sleep apnea     cpap       Past Surgical History:   Procedure Laterality Date    BACK SURGERY      x 12    CATARACT EXTRACTION W/  INTRAOCULAR LENS IMPLANT Left     Dr Coleman     HYSTERECTOMY  1975    endometriosis    pain pump placement      SPINE SURGERY  5-13-13    CERVICAL FUSION       Review of patient's allergies indicates:   Allergen Reactions    Imitrex [sumatriptan succinate] Shortness Of Breath    Penicillins Shortness Of Breath     Other reaction(s): Jittery    Percocet [oxycodone-acetaminophen] Anaphylaxis    Topamax [topiramate] Shortness Of Breath    Vancomycin Shortness Of Breath     Rash    (d)-limonene flavor      Other reaction(s): difficult intubation  Other reaction(s): Jittery  Other reaction(s): Difficulty breathing  Other reaction(s): Difficulty breathing  Other reaction(s): Jittery  Other reaction(s): Difficulty breathing    Bactrim [sulfamethoxazole-trimethoprim] Nausea And Vomiting     Other reaction(s): Resp Depression  Other reaction(s): Anaphylaxis    Butorphanol tartrate     Darvocet a500 [propoxyphene n-acetaminophen]       Other reaction(s): Jittery  Other reaction(s): Difficulty breathing    Fentanyl      Other reaction(s): Vomiting  Other reaction(s): Nausea  Other reaction(s): Itching  swelling    Phenytoin sodium extended      pATIENT DENIES EVER HAVING THIS MEDICATION    White petrolatum-zinc     Zinc oxide-white petrolatum      Other reaction(s): Difficulty breathing    Latex, natural rubber Itching and Rash       No current facility-administered medications on file prior to encounter.      Current Outpatient Prescriptions on File Prior to Encounter   Medication Sig    alprazolam (XANAX) 0.25 MG tablet Take 1 tablet (0.25 mg total) by mouth once daily. (Patient taking differently: Take 12.5 mg by mouth 2 (two) times daily as needed. )    aspirin (ECOTRIN) 81 MG EC tablet Take 1 tablet (81 mg total) by mouth once daily.    atorvastatin (LIPITOR) 80 MG tablet Take 1 tablet (80 mg total) by mouth once daily. (Patient taking differently: Take 80 mg by mouth every evening. )    buPROPion (WELLBUTRIN XL) 150 MG TB24 tablet Take 2 tablets (300 mg total) by mouth once daily.    BUTALBITAL/ASPIRIN/CAFFEINE (FIORINAL ORAL) Take by mouth as needed.    clopidogrel (PLAVIX) 75 mg tablet Take 1 tablet (75 mg total) by mouth once daily.    docusate sodium (COLACE) 100 MG capsule Take 1 capsule (100 mg total) by mouth 3 (three) times daily as needed for Constipation.    doxycycline (MONODOX) 100 MG capsule     fluoxetine (PROZAC) 20 MG capsule Take 2 capsules (40 mg total) by mouth once daily.    furosemide (LASIX) 40 MG tablet Take 1 tablet (40 mg total) by mouth 2 (two) times daily.    hydrocodone-acetaminophen 10-325mg (NORCO)  mg Tab Take 1 tablet by mouth every 6 (six) hours as needed.    hyoscyamine (ANASPAZ,LEVSIN) 0.125 mg Tab TAKE 1 TABLET (125 MCG TOTAL) BY MOUTH EVERY 6 (SIX) HOURS AS NEEDED.    levothyroxine (SYNTHROID) 100 MCG tablet Take 1 tablet (100 mcg total) by mouth before breakfast.    ondansetron  (ZOFRAN) 8 MG tablet Take 1 tablet (8 mg total) by mouth every 12 (twelve) hours as needed for Nausea.    pantoprazole (PROTONIX) 40 MG tablet Take 1 tablet (40 mg total) by mouth once daily.    polyethylene glycol (GLYCOLAX) 17 gram PwPk Take 17 g by mouth 2 (two) times daily.    potassium chloride SA (K-DUR,KLOR-CON) 20 MEQ tablet Take 1 tablet (20 mEq total) by mouth 2 (two) times daily.    SENNOSIDES (SENNA LAX ORAL) Take by mouth as needed.    [DISCONTINUED] lamotrigine (LAMICTAL) 25 MG tablet Take 1 tablet (25 mg total) by mouth once daily.    [DISCONTINUED] ranitidine (ZANTAC) 150 MG capsule Take 150 mg by mouth daily as needed.      Family History     Problem Relation (Age of Onset)    Cancer Mother (55), Father    Heart disease Maternal Uncle    No Known Problems Brother, Brother, Sister, Maternal Aunt, Paternal Aunt, Paternal Uncle, Maternal Grandfather, Paternal Grandmother, Paternal Grandfather    Parkinsonism Maternal Grandmother    Tremor Maternal Grandmother        Social History Main Topics    Smoking status: Never Smoker    Smokeless tobacco: Never Used    Alcohol use No      Comment: denies    Drug use: No    Sexual activity: No     Review of Systems   Constitutional: Positive for unexpected weight change (30-40 pound unitentional wieght loss). Negative for chills, diaphoresis and fever.   HENT: Negative for sore throat and trouble swallowing.    Eyes: Negative for photophobia and visual disturbance.   Respiratory: Negative for cough, shortness of breath and wheezing.    Cardiovascular: Positive for leg swelling (Lymphedema). Negative for chest pain and palpitations.   Gastrointestinal: Positive for constipation. Negative for abdominal distention, abdominal pain, diarrhea, nausea and vomiting.   Endocrine: Negative for polydipsia and polyphagia.   Genitourinary: Positive for hematuria. Negative for decreased urine volume, dysuria and urgency.        Suprapubic Catheter secondary to  neurogenic bladder   Musculoskeletal: Negative for joint swelling, neck pain and neck stiffness.   Neurological: Positive for syncope, facial asymmetry (Right lip droop since birst) and headaches. Negative for weakness and numbness.   Psychiatric/Behavioral: Positive for dysphoric mood and sleep disturbance. Negative for agitation and suicidal ideas. The patient is nervous/anxious.      Objective:     Vital Signs (Most Recent):  Temp: 97.8 °F (36.6 °C) (03/02/17 1340)  Pulse: (!) 58 (03/02/17 1539)  Resp: 18 (03/02/17 1535)  BP: 114/60 (03/02/17 1539)  SpO2: 97 % (03/02/17 1340) Vital Signs (24h Range):  Temp:  [97.8 °F (36.6 °C)] 97.8 °F (36.6 °C)  Pulse:  [55-64] 58  Resp:  [18] 18  SpO2:  [97 %] 97 %  BP: (102-122)/(54-60) 114/60     Weight: 70.8 kg (156 lb)  Body mass index is 26.78 kg/(m^2).    Physical Exam   Constitutional: She is oriented to person, place, and time. She appears cachectic. She has a sickly appearance. She appears distressed.   Ecchymosis to the right lauro with raccoon eyes bilaterally.  Laceration with hematoma to the left temporal area which is tender to palpation.      HENT:   Mouth/Throat: Oropharynx is clear and moist. No oropharyngeal exudate.   Eyes: Conjunctivae and EOM are normal. Pupils are equal, round, and reactive to light. No scleral icterus.   Neck: Neck supple.   Cardiovascular: Normal rate, regular rhythm, normal heart sounds and intact distal pulses.  Exam reveals no gallop and no friction rub.    No murmur heard.  Pulmonary/Chest: Effort normal and breath sounds normal. No respiratory distress. She has no wheezes. She has no rales.   Abdominal: Soft. Bowel sounds are normal. She exhibits no distension. There is no tenderness. There is no rebound and no guarding.   Implantable Pump RUQ   Genitourinary:   Genitourinary Comments: Supra-Pubic Catheter Draining yellow urine   Musculoskeletal: She exhibits no edema, tenderness or deformity.   Neurological: She is alert and  oriented to person, place, and time. She exhibits normal muscle tone.   Right droop of lip   Skin: Skin is warm and dry. No rash noted. She is not diaphoretic.   Psychiatric: Her mood appears anxious. She exhibits a depressed mood.   Nursing note and vitals reviewed.       Significant Labs:   BMP:   Recent Labs  Lab 03/02/17  1608   GLU 81      K 3.7      CO2 33*   BUN 7   CREATININE 0.8   CALCIUM 9.3     CBC:   Recent Labs  Lab 03/02/17  1608   WBC 4.79   HGB 10.1*   HCT 32.9*        Coagulation: No results for input(s): INR, APTT in the last 48 hours.    Invalid input(s): PT  Lactic Acid: No results for input(s): LACTATE in the last 48 hours.  Troponin:   Recent Labs  Lab 03/02/17  1608   TROPONINI 0.010     Urine Studies:   Recent Labs  Lab 03/02/17  1732   COLORU Yellow   APPEARANCEUA Clear   PHUR 8.0   SPECGRAV 1.010   PROTEINUA Negative   GLUCUA Negative   KETONESU Negative   BILIRUBINUA Negative   OCCULTUA Negative   NITRITE Negative   UROBILINOGEN Negative   LEUKOCYTESUR Negative       Significant Imaging:   Imaging Results         CT Cervical Spine Without Contrast (Final result) Result time:  03/02/17 16:34:39    Final result by Lamont Berg MD (03/02/17 16:34:39)    Impression:      No evidence of acute cervical spine fracture or dislocation.    Multilevel cervical spine DJD with postsurgical changes of ACDF C4-C7.      Electronically signed by: LAMONT BERG MD  Date:     03/02/17  Time:    16:34     Narrative:    Clinical indication: 61 year-old female status post fall.    Comparison: None.    Technique: 2-mm axial images were obtained through the cervical spine without the use of IV contrast.  Coronal and sagittal reformats are also available for review.    Findings:  No evidence of acute cervical spine fracture or dislocation.  Postsurgical changes visualized consistent with anterior cervical discectomy and fusion at the C4-C7 levels.  Hardware appears intact with no  complication seen.  Odontoid process is intact.  Vertebral body heights are maintained without evidence of fracture.  Prominent posterior disc osteophyte complex is seen at the C3-4 level.  There is multilevel degenerative change, facet arthropathy, and neuroforaminal narrowing.  Craniocervical junction is unremarkable.  Mild spinal canal stenosis is visualized at C3-4.    Surrounding soft tissues show no significant abnormalities.  Airway is patent.  Visualized lung apices are clear.            CT Head Without Contrast (Final result) Result time:  03/02/17 16:25:52    Final result by Marlee Patel MD (03/02/17 16:25:52)    Impression:        New large area of left temporoparietal subcutaneous soft tissue thickening and unchanged small area of right frontal subcutaneous soft tissue thickening. No fracture or intracranial hemorrhage.      Electronically signed by: MARLEE PATEL  Date:     03/02/17  Time:    16:25     Narrative:    HISTORY:  fall    TECHNIQUE: 5 mm axial images were acquired from the vertex to the skullbase, without administration of contrast. Multiplanar reconstructions were provided for review.    COMPARISON: 3/1/17    FINDINGS:    Brain and intracranial structures: There is no mass lesion, hemorrhage, or acute infarct. Gray-white differentiation is preserved. Ventricles and sulci are normal for age.     Skull:  Normal.    Scalp: Unchanged small area of right frontal subcutaneous soft tissue thickening. New large left temporoparietal subcutaneous soft tissue thickening    Orbits and face (included portions): Normal.    Paranasal sinuses and mastoid air cells (included portions): Normal.            Assessment/Plan:     * Syncope and collapse  Patient has had up to 4 syncopal episodes since 2/1/17.  She has not been to see her pain management doctor and her pain pump continues to infuse at an unknown rate. THis is most likely medication related.   CT of the Head negative for intracranial processes.  Will  get Echo, Trend Troponin , and Monitor on Telemetry to investigate cardiac causes of syncope.       Major depressive disorder, single episode  Anxiety  Continue Bupropion  mg daily and Fluoxetine 40 mg daily.    Chronic pain  Narcotic Dependency  Work injury about 20 years ago resulting in multiple back surgeries.  Has implantable Dilaudid pain pump that cannot be monitored or adjusted without special equipment as it is completely under the skin. It is managed by Dr. Hillman, Pain Management.  She also takes Norco 10 for breakthrough pain, which she uses less then once per day. Continue current regimen and monitor    Combined systolic and diastolic cardiac dysfunction  Lymphedema of Bilateral Lower Extremeties  -Holding home furosemide 40 mg daily, Potassium 20 MEQ daiily, Plavix 75 mg daily, and ASA 81 mg daily d/t Traumatic Syncope      GERD (gastroesophageal reflux disease)  Continue home pantoprazole 40 mg daily      Neurogenic bladder  Urinary Retention  Secondary to sever Lumbar disease.  She has a suprapubic catheter. Urinalysis clean. Followed by Dr. Griffin. Monitor Intake and Output      Weight loss, unintentional  She has experienced 30-40 pound weight loss. Chronic debility and depression      Primary hypothyroidism  Continue levothyroxine 100 mcg daily      VTE Risk Mitigation     None        Nurys Khan, NP  Department of Hospital Medicine   [facility]

## 2017-03-03 NOTE — PLAN OF CARE
Future Appointments  Date Time Provider Department Center   3/8/2017 1:30 PM [facility] [id number] LB LIMIT [facility] XRAY OP [facility]   3/8/2017 2:20 PM ALEXY Taylor [facility] NEUROS7 [facility]   3/17/2017 10:00 AM Shireen Mayo MD [facility] UROLOGY [facility]   3/23/2017 10:00 AM Mesfin Hodges II, MD [facility] IM [facility] PCW   4/26/2017 12:00 PM Erik Oconnor MD [facility] PSYCH [facility]     Faxed order for wheelchair to [facility] to be delivered to patient's room.       03/03/17 1613   Final Note   Assessment Type Final Discharge Note   Discharge Disposition Home   Discharge planning education complete? Yes   Hospital Follow Up  Appt(s) scheduled? Yes   Discharge plans and expectations educations in teach back method with documentation complete? Yes   Offered [facility] -- Bedside Delivery? n/a   Discharge/Hospital Encounter Summary to (non[facility]) PCP No   Referral to Outpatient Case Management complete? No   Referral to / orders for Home Health Complete? Yes  ([facility])   30 day supply of medicines given at discharge, if documented non-compliance / non-adherence? No   Any social issues identified prior to discharge? No   Did you assess the readiness or willingness of the family or caregiver to support self management of care? No   Right Care Referral Info   Post Acute Recommendation Home-care   Referral Type Home Care   Facility Name [facility]     Jeaneth Caballero, RN, CCM, CMSRN  RN Transition Navigator  [phone]

## 2017-03-03 NOTE — ASSESSMENT & PLAN NOTE
Lymphedema of Bilateral Lower Extremeties  -Holding home furosemide 40 mg daily, Potassium 20 MEQ daiily, Plavix 75 mg daily, and ASA 81 mg daily d/t Traumatic Syncope

## 2017-03-03 NOTE — SUBJECTIVE & OBJECTIVE
Review of Systems   Constitutional: Positive for unexpected weight change (30-40 pound unitentional wieght loss). Negative for chills, diaphoresis and fever.   HENT: Negative for sore throat and trouble swallowing.    Eyes: Negative for photophobia and visual disturbance.   Respiratory: Negative for cough, shortness of breath and wheezing.    Cardiovascular: Positive for leg swelling (Lymphedema). Negative for chest pain and palpitations.   Gastrointestinal: Positive for constipation. Negative for abdominal distention, abdominal pain, diarrhea, nausea and vomiting.   Endocrine: Negative for polydipsia and polyphagia.   Genitourinary: Negative for decreased urine volume and dysuria.        Suprapubic Catheter secondary to neurogenic bladder   Musculoskeletal: Negative for joint swelling, neck pain and neck stiffness.   Neurological: Positive for syncope, facial asymmetry (Right lip droop since birst) and headaches. Negative for weakness and numbness.   Psychiatric/Behavioral: Positive for dysphoric mood and sleep disturbance. Negative for agitation and suicidal ideas. The patient is nervous/anxious.      Objective:     Vital Signs (Most Recent):  Temp: 97.9 °F (36.6 °C) (03/03/17 1200)  Pulse: (!) 49 (03/03/17 1200)  Resp: 16 (03/03/17 1200)  BP: (!) 101/58 (03/03/17 1200)  SpO2: 99 % (03/03/17 1158) Vital Signs (24h Range):  Temp:  [97.5 °F (36.4 °C)-98.2 °F (36.8 °C)] 97.9 °F (36.6 °C)  Pulse:  [49-64] 49  Resp:  [16-18] 16  SpO2:  [95 %-100 %] 99 %  BP: ()/(42-71) 101/58     Weight: 73.4 kg (161 lb 13.1 oz)  Body mass index is 29.6 kg/(m^2).    Intake/Output Summary (Last 24 hours) at 03/03/17 1452  Last data filed at 03/03/17 0800   Gross per 24 hour   Intake              600 ml   Output             1300 ml   Net             -700 ml      Physical Exam   Constitutional: She is oriented to person, place, and time. She appears cachectic. She has a sickly appearance. She appears distressed.   Ecchymosis to  the right lauro with raccoon eyes bilaterally.  Laceration with hematoma to the left temporal area which is tender to palpation.      HENT:   Mouth/Throat: Oropharynx is clear and moist. No oropharyngeal exudate.   Eyes: Conjunctivae and EOM are normal. Pupils are equal, round, and reactive to light. No scleral icterus.   Neck: Neck supple.   Cardiovascular: Normal rate, regular rhythm, normal heart sounds and intact distal pulses.  Exam reveals no gallop and no friction rub.    No murmur heard.  Pulmonary/Chest: Effort normal and breath sounds normal. No respiratory distress. She has no wheezes. She has no rales.   Abdominal: Soft. Bowel sounds are normal. She exhibits no distension. There is no tenderness. There is no rebound and no guarding.   Implantable Pump RUQ   Genitourinary:   Genitourinary Comments: Supra-Pubic Catheter Draining yellow urine   Musculoskeletal: She exhibits no edema, tenderness or deformity.   Neurological: She is alert and oriented to person, place, and time. She exhibits normal muscle tone.   Right droop of lip   Skin: Skin is warm and dry. No rash noted. She is not diaphoretic.   Psychiatric: Her mood appears anxious. She exhibits a depressed mood.   Nursing note and vitals reviewed.      Significant Labs:   CBC:   Recent Labs  Lab 03/02/17  1608 03/03/17  0720   WBC 4.79 3.79*   HGB 10.1* 9.3*   HCT 32.9* 31.1*    188     CMP:   Recent Labs  Lab 03/02/17  1608 03/03/17  0720    140   K 3.7 3.8    104   CO2 33* 31*   GLU 81 137*   BUN 7 8   CREATININE 0.8 0.9   CALCIUM 9.3 8.6*   PROT 6.8  --    ALBUMIN 3.5  --    BILITOT 0.4  --    ALKPHOS 108  --    AST 16  --    ALT 12  --    ANIONGAP 7* 5*   EGFRNONAA >60 >60     Troponin:   Recent Labs  Lab 03/02/17  2345 03/03/17  0720 03/03/17  1114   TROPONINI <0.006 <0.006 <0.006       Significant Imaging:   Imaging Results         CT Cervical Spine Without Contrast (Final result) Result time:  03/02/17 16:34:39    Final  result by Lamont Berg MD (03/02/17 16:34:39)    Impression:      No evidence of acute cervical spine fracture or dislocation.    Multilevel cervical spine DJD with postsurgical changes of ACDF C4-C7.      Electronically signed by: LAMONT BERG MD  Date:     03/02/17  Time:    16:34     Narrative:    Clinical indication: 61 year-old female status post fall.    Comparison: None.    Technique: 2-mm axial images were obtained through the cervical spine without the use of IV contrast.  Coronal and sagittal reformats are also available for review.    Findings:  No evidence of acute cervical spine fracture or dislocation.  Postsurgical changes visualized consistent with anterior cervical discectomy and fusion at the C4-C7 levels.  Hardware appears intact with no complication seen.  Odontoid process is intact.  Vertebral body heights are maintained without evidence of fracture.  Prominent posterior disc osteophyte complex is seen at the C3-4 level.  There is multilevel degenerative change, facet arthropathy, and neuroforaminal narrowing.  Craniocervical junction is unremarkable.  Mild spinal canal stenosis is visualized at C3-4.    Surrounding soft tissues show no significant abnormalities.  Airway is patent.  Visualized lung apices are clear.            CT Head Without Contrast (Final result) Result time:  03/02/17 16:25:52    Final result by Marlee Abebe MD (03/02/17 16:25:52)    Impression:        New large area of left temporoparietal subcutaneous soft tissue thickening and unchanged small area of right frontal subcutaneous soft tissue thickening. No fracture or intracranial hemorrhage.      Electronically signed by: MARLEE ABEBE  Date:     03/02/17  Time:    16:25     Narrative:    HISTORY:  fall    TECHNIQUE: 5 mm axial images were acquired from the vertex to the skullbase, without administration of contrast. Multiplanar reconstructions were provided for review.    COMPARISON: 3/1/17    FINDINGS:    Brain and  intracranial structures: There is no mass lesion, hemorrhage, or acute infarct. Gray-white differentiation is preserved. Ventricles and sulci are normal for age.     Skull:  Normal.    Scalp: Unchanged small area of right frontal subcutaneous soft tissue thickening. New large left temporoparietal subcutaneous soft tissue thickening    Orbits and face (included portions): Normal.    Paranasal sinuses and mastoid air cells (included portions): Normal.            Echocardiogram 3/3/2017  CONCLUSIONS     1 - Normal left ventricular systolic function (EF 55-60%).     2 - Normal left ventricular diastolic function.     3 - Normal right ventricular systolic function .     4 - The estimated PA systolic pressure is 38 mmHg.     5 - Intermediate central venous pressure.             This document has been electronically    SIGNED BY: Palomo Greene MD On: 03/03/2017 13:07

## 2017-03-03 NOTE — ASSESSMENT & PLAN NOTE
Lymphedema of Bilateral Lower Extremeties  -Holding home furosemide 40 mg daily, Potassium 20 MEQ daiily, Plavix 75 mg daily d/t Traumatic Syncope  -Resume ASA 81 mg daily   -3/3/17 Echo shows EF 55%.  Improved from prior Echo  -Troponin negative times 3  -Please follow up with PCP on resumption of Diuretics and Plavix

## 2017-03-03 NOTE — ASSESSMENT & PLAN NOTE
Narcotic Dependency  Work injury about 20 years ago resulting in multiple back surgeries.  Has implantable Dilaudid pain pump that cannot be monitored or adjusted without special equipment as it is completely under the skin. It is managed by Dr. Hillman, Pain Management.  She also takes Norco 10 for breakthrough pain, which she uses less then once per day. Follow up with pain management on 3/6/17, as scheduled

## 2017-03-03 NOTE — ASSESSMENT & PLAN NOTE
Urinary Retention  Secondary to sever Lumbar disease.  She has a suprapubic catheter. Urinalysis clean. Followed by Dr. Griffin. Monitor Intake and Output

## 2017-03-03 NOTE — SUBJECTIVE & OBJECTIVE
Past Medical History:   Diagnosis Date    Allergy     Anxiety     Arthritis     Carotid artery occlusion     Cataract     Depression     Edema     chronic    Fever blister     Hypothyroid     Iron deficiency anemia     Joint pain     Lumbar radiculopathy     with chronic pain    Ocular migraine     Sleep apnea     cpap       Past Surgical History:   Procedure Laterality Date    BACK SURGERY      x 12    CATARACT EXTRACTION W/  INTRAOCULAR LENS IMPLANT Left     Dr Coleman     HYSTERECTOMY  1975    endometriosis    pain pump placement      SPINE SURGERY  5-13-13    CERVICAL FUSION       Review of patient's allergies indicates:   Allergen Reactions    Imitrex [sumatriptan succinate] Shortness Of Breath    Penicillins Shortness Of Breath     Other reaction(s): Jittery    Percocet [oxycodone-acetaminophen] Anaphylaxis    Topamax [topiramate] Shortness Of Breath    Vancomycin Shortness Of Breath     Rash    (d)-limonene flavor      Other reaction(s): difficult intubation  Other reaction(s): Jittery  Other reaction(s): Difficulty breathing  Other reaction(s): Difficulty breathing  Other reaction(s): Jittery  Other reaction(s): Difficulty breathing    Bactrim [sulfamethoxazole-trimethoprim] Nausea And Vomiting     Other reaction(s): Resp Depression  Other reaction(s): Anaphylaxis    Butorphanol tartrate     Darvocet a500 [propoxyphene n-acetaminophen]      Other reaction(s): Jittery  Other reaction(s): Difficulty breathing    Fentanyl      Other reaction(s): Vomiting  Other reaction(s): Nausea  Other reaction(s): Itching  swelling    Phenytoin sodium extended      pATIENT DENIES EVER HAVING THIS MEDICATION    White petrolatum-zinc     Zinc oxide-white petrolatum      Other reaction(s): Difficulty breathing    Latex, natural rubber Itching and Rash       No current facility-administered medications on file prior to encounter.      Current Outpatient Prescriptions on File Prior to Encounter    Medication Sig    alprazolam (XANAX) 0.25 MG tablet Take 1 tablet (0.25 mg total) by mouth once daily. (Patient taking differently: Take 12.5 mg by mouth 2 (two) times daily as needed. )    aspirin (ECOTRIN) 81 MG EC tablet Take 1 tablet (81 mg total) by mouth once daily.    atorvastatin (LIPITOR) 80 MG tablet Take 1 tablet (80 mg total) by mouth once daily. (Patient taking differently: Take 80 mg by mouth every evening. )    buPROPion (WELLBUTRIN XL) 150 MG TB24 tablet Take 2 tablets (300 mg total) by mouth once daily.    BUTALBITAL/ASPIRIN/CAFFEINE (FIORINAL ORAL) Take by mouth as needed.    clopidogrel (PLAVIX) 75 mg tablet Take 1 tablet (75 mg total) by mouth once daily.    docusate sodium (COLACE) 100 MG capsule Take 1 capsule (100 mg total) by mouth 3 (three) times daily as needed for Constipation.    doxycycline (MONODOX) 100 MG capsule     fluoxetine (PROZAC) 20 MG capsule Take 2 capsules (40 mg total) by mouth once daily.    furosemide (LASIX) 40 MG tablet Take 1 tablet (40 mg total) by mouth 2 (two) times daily.    hydrocodone-acetaminophen 10-325mg (NORCO)  mg Tab Take 1 tablet by mouth every 6 (six) hours as needed.    hyoscyamine (ANASPAZ,LEVSIN) 0.125 mg Tab TAKE 1 TABLET (125 MCG TOTAL) BY MOUTH EVERY 6 (SIX) HOURS AS NEEDED.    levothyroxine (SYNTHROID) 100 MCG tablet Take 1 tablet (100 mcg total) by mouth before breakfast.    ondansetron (ZOFRAN) 8 MG tablet Take 1 tablet (8 mg total) by mouth every 12 (twelve) hours as needed for Nausea.    pantoprazole (PROTONIX) 40 MG tablet Take 1 tablet (40 mg total) by mouth once daily.    polyethylene glycol (GLYCOLAX) 17 gram PwPk Take 17 g by mouth 2 (two) times daily.    potassium chloride SA (K-DUR,KLOR-CON) 20 MEQ tablet Take 1 tablet (20 mEq total) by mouth 2 (two) times daily.    SENNOSIDES (SENNA LAX ORAL) Take by mouth as needed.    [DISCONTINUED] lamotrigine (LAMICTAL) 25 MG tablet Take 1 tablet (25 mg total) by mouth once  daily.    [DISCONTINUED] ranitidine (ZANTAC) 150 MG capsule Take 150 mg by mouth daily as needed.      Family History     Problem Relation (Age of Onset)    Cancer Mother (55), Father    Heart disease Maternal Uncle    No Known Problems Brother, Brother, Sister, Maternal Aunt, Paternal Aunt, Paternal Uncle, Maternal Grandfather, Paternal Grandmother, Paternal Grandfather    Parkinsonism Maternal Grandmother    Tremor Maternal Grandmother        Social History Main Topics    Smoking status: Never Smoker    Smokeless tobacco: Never Used    Alcohol use No      Comment: denies    Drug use: No    Sexual activity: No     Review of Systems   Constitutional: Positive for unexpected weight change (30-40 pound unitentional wieght loss). Negative for chills, diaphoresis and fever.   HENT: Negative for sore throat and trouble swallowing.    Eyes: Negative for photophobia and visual disturbance.   Respiratory: Negative for cough, shortness of breath and wheezing.    Cardiovascular: Positive for leg swelling (Lymphedema). Negative for chest pain and palpitations.   Gastrointestinal: Positive for constipation. Negative for abdominal distention, abdominal pain, diarrhea, nausea and vomiting.   Endocrine: Negative for polydipsia and polyphagia.   Genitourinary: Positive for hematuria. Negative for decreased urine volume, dysuria and urgency.        Suprapubic Catheter secondary to neurogenic bladder   Musculoskeletal: Negative for joint swelling, neck pain and neck stiffness.   Neurological: Positive for syncope, facial asymmetry (Right lip droop since birst) and headaches. Negative for weakness and numbness.   Psychiatric/Behavioral: Positive for dysphoric mood and sleep disturbance. Negative for agitation and suicidal ideas. The patient is nervous/anxious.      Objective:     Vital Signs (Most Recent):  Temp: 97.8 °F (36.6 °C) (03/02/17 1340)  Pulse: (!) 58 (03/02/17 1539)  Resp: 18 (03/02/17 1535)  BP: 114/60 (03/02/17  1539)  SpO2: 97 % (03/02/17 1340) Vital Signs (24h Range):  Temp:  [97.8 °F (36.6 °C)] 97.8 °F (36.6 °C)  Pulse:  [55-64] 58  Resp:  [18] 18  SpO2:  [97 %] 97 %  BP: (102-122)/(54-60) 114/60     Weight: 70.8 kg (156 lb)  Body mass index is 26.78 kg/(m^2).    Physical Exam   Constitutional: She is oriented to person, place, and time. She appears cachectic. She has a sickly appearance. She appears distressed.   Ecchymosis to the right lauro with raccoon eyes bilaterally.  Laceration with hematoma to the left temporal area which is tender to palpation.      HENT:   Mouth/Throat: Oropharynx is clear and moist. No oropharyngeal exudate.   Eyes: Conjunctivae and EOM are normal. Pupils are equal, round, and reactive to light. No scleral icterus.   Neck: Neck supple.   Cardiovascular: Normal rate, regular rhythm, normal heart sounds and intact distal pulses.  Exam reveals no gallop and no friction rub.    No murmur heard.  Pulmonary/Chest: Effort normal and breath sounds normal. No respiratory distress. She has no wheezes. She has no rales.   Abdominal: Soft. Bowel sounds are normal. She exhibits no distension. There is no tenderness. There is no rebound and no guarding.   Implantable Pump RUQ   Genitourinary:   Genitourinary Comments: Supra-Pubic Catheter Draining yellow urine   Musculoskeletal: She exhibits no edema, tenderness or deformity.   Neurological: She is alert and oriented to person, place, and time. She exhibits normal muscle tone.   Right droop of lip   Skin: Skin is warm and dry. No rash noted. She is not diaphoretic.   Psychiatric: Her mood appears anxious. She exhibits a depressed mood.   Nursing note and vitals reviewed.       Significant Labs:   BMP:   Recent Labs  Lab 03/02/17  1608   GLU 81      K 3.7      CO2 33*   BUN 7   CREATININE 0.8   CALCIUM 9.3     CBC:   Recent Labs  Lab 03/02/17  1608   WBC 4.79   HGB 10.1*   HCT 32.9*        Coagulation: No results for input(s): INR,  APTT in the last 48 hours.    Invalid input(s): PT  Lactic Acid: No results for input(s): LACTATE in the last 48 hours.  Troponin:   Recent Labs  Lab 03/02/17  1608   TROPONINI 0.010     Urine Studies:   Recent Labs  Lab 03/02/17  1732   COLORU Yellow   APPEARANCEUA Clear   PHUR 8.0   SPECGRAV 1.010   PROTEINUA Negative   GLUCUA Negative   KETONESU Negative   BILIRUBINUA Negative   OCCULTUA Negative   NITRITE Negative   UROBILINOGEN Negative   LEUKOCYTESUR Negative       Significant Imaging:   Imaging Results         CT Cervical Spine Without Contrast (Final result) Result time:  03/02/17 16:34:39    Final result by Lamont Berg MD (03/02/17 16:34:39)    Impression:      No evidence of acute cervical spine fracture or dislocation.    Multilevel cervical spine DJD with postsurgical changes of ACDF C4-C7.      Electronically signed by: LAMONT BERG MD  Date:     03/02/17  Time:    16:34     Narrative:    Clinical indication: 61 year-old female status post fall.    Comparison: None.    Technique: 2-mm axial images were obtained through the cervical spine without the use of IV contrast.  Coronal and sagittal reformats are also available for review.    Findings:  No evidence of acute cervical spine fracture or dislocation.  Postsurgical changes visualized consistent with anterior cervical discectomy and fusion at the C4-C7 levels.  Hardware appears intact with no complication seen.  Odontoid process is intact.  Vertebral body heights are maintained without evidence of fracture.  Prominent posterior disc osteophyte complex is seen at the C3-4 level.  There is multilevel degenerative change, facet arthropathy, and neuroforaminal narrowing.  Craniocervical junction is unremarkable.  Mild spinal canal stenosis is visualized at C3-4.    Surrounding soft tissues show no significant abnormalities.  Airway is patent.  Visualized lung apices are clear.            CT Head Without Contrast (Final result) Result time:   03/02/17 16:25:52    Final result by Marlee Patel MD (03/02/17 16:25:52)    Impression:        New large area of left temporoparietal subcutaneous soft tissue thickening and unchanged small area of right frontal subcutaneous soft tissue thickening. No fracture or intracranial hemorrhage.      Electronically signed by: MARLEE PATEL  Date:     03/02/17  Time:    16:25     Narrative:    HISTORY:  fall    TECHNIQUE: 5 mm axial images were acquired from the vertex to the skullbase, without administration of contrast. Multiplanar reconstructions were provided for review.    COMPARISON: 3/1/17    FINDINGS:    Brain and intracranial structures: There is no mass lesion, hemorrhage, or acute infarct. Gray-white differentiation is preserved. Ventricles and sulci are normal for age.     Skull:  Normal.    Scalp: Unchanged small area of right frontal subcutaneous soft tissue thickening. New large left temporoparietal subcutaneous soft tissue thickening    Orbits and face (included portions): Normal.    Paranasal sinuses and mastoid air cells (included portions): Normal.

## 2017-03-07 LAB — BACTERIA BLD CULT: NORMAL

## 2017-03-10 ENCOUNTER — HOSPITAL ENCOUNTER (OUTPATIENT)
Dept: RADIOLOGY | Facility: HOSPITAL | Age: 62
Discharge: HOME | End: 2017-03-10
Attending: NEUROLOGICAL SURGERY
Payer: MEDICARE

## 2017-03-10 ENCOUNTER — OUTPATIENT CASE MANAGEMENT (OUTPATIENT)
Dept: ADMINISTRATIVE | Facility: OTHER | Age: 62
End: 2017-03-10

## 2017-03-10 DIAGNOSIS — M54.2 CERVICAL PAIN (NECK): ICD-10-CM

## 2017-03-10 DIAGNOSIS — G89.29 CHRONIC BILATERAL LOW BACK PAIN WITH BILATERAL SCIATICA: ICD-10-CM

## 2017-03-10 DIAGNOSIS — M54.42 CHRONIC BILATERAL LOW BACK PAIN WITH BILATERAL SCIATICA: ICD-10-CM

## 2017-03-10 DIAGNOSIS — M54.41 CHRONIC BILATERAL LOW BACK PAIN WITH BILATERAL SCIATICA: ICD-10-CM

## 2017-03-10 PROCEDURE — 72052 X-RAY EXAM NECK SPINE 6/>VWS: CPT | Mod: 26,,, | Performed by: RADIOLOGY

## 2017-03-10 PROCEDURE — 72114 X-RAY EXAM L-S SPINE BENDING: CPT | Mod: TC

## 2017-03-10 PROCEDURE — 72052 X-RAY EXAM NECK SPINE 6/>VWS: CPT | Mod: TC

## 2017-03-10 PROCEDURE — 72114 X-RAY EXAM L-S SPINE BENDING: CPT | Mod: 26,,, | Performed by: RADIOLOGY

## 2017-03-10 NOTE — PATIENT INSTRUCTIONS
Heart Failure: Tracking Your Weight  You have a condition called heart failure. When you have heart failure, a sudden weight gain or a steady rise in weight is a warning sign that your body is retaining too much water and salt. This could mean your heart failure is getting worse. If left untreated, it can cause problems for your lungs and result in shortness of breath. Weighing yourself each day is the best way to know if youre retaining water. If your weight goes up quickly, call your doctor. You will be given instructions on how to get rid of the excess water. You will likely need medicines and to avoid salt. This will help your heart work better.  Call your doctor if you gain more than 2 pounds in 1 day, more than 5 pounds in 1 week, or whatever weight gain you were told to report by your doctor. This is often a sign of worsening heart failure and needs to be evaluated and treated. Your doctor will tell you what to do next.   Tips for weighing yourself    · Weigh yourself at the same time each morning, wearing the same clothes. Weigh yourself after urinating and before eating.  · Use the same scale each day. Make sure the numbers are easy to read. Put the scale on a flat, hard surface -- not on a rug or carpet.  · Do not stop weighing yourself. If you forget one day, weigh again the next morning.  How to use your weight chart  · Keep your weight chart near the scale. Write your weight on the chart as soon as you get off the scale.  · Fill in the month and the start date on the chart. Then write down your weight each day. Your chart will look like this:    · If you miss a day, leave the space blank. Weigh yourself the next day and write your weight in the next space.  · Take your weight chart with you when you go to see your doctor.  Date Last Reviewed: 3/20/2016  © 7470-9013 [employer]. [address] [place] [address], [place] [address]. All rights reserved. This information is not intended as a  substitute for professional medical care. Always follow your healthcare professional's instructions.        Heart Failure: Warning Signs of a Flare-Up  You have a condition called heart failure. Once you have heart failure, flare-ups can happen. Below are signs that can mean your heart failure is getting worse. If you notice any of these warning signs, call your healthcare provider.  Swelling    · Your feet, ankles, or lower legs get puffier.  · You notice skin changes on your lower legs.  · Your shoes feel too tight.  · Your clothes are tighter in the waist.  · You have trouble getting rings on or off your fingers.  Shortness of breath  · You have to breathe harder even when youre doing your normal activities or when youre resting.  · You are short of breath walking up stairs or even short distances.  · You wake up at night short of breath or coughing.  · You need to use more pillows or sit up to sleep.  · You wake up tired or restless.  Other warning signs  · You feel weaker, dizzy, or more tired.  · You have chest pain or changes in your heartbeat.  · You have a cough that wont go away.  · You cant remember things or dont feel like eating.  Tracking your weight  Gaining weight is often the first warning sign that heart failure is getting worse. Gaining even a few pounds can be a sign that your body is retaining excess water and salt. Weighing yourself each day in the morning after you urinate and before you eat, is the best way to know if you're retaining water. Get a scale that is easy to read and make sure you wear the same clothes and use the same scale every time you weigh. Your healthcare provider will show you how to track your weight. Call your doctor if you gain more than 2 pounds in 1 day, 5 pounds in 1 week, or whatever weight gain you were told to report by your doctor. This is often a sign of worsening heart failure and needs to be evaluated and treated before it compromises your breathing. Your  doctor will tell you what to do next.    Date Last Reviewed: 3/15/2016  © 4775-0393 [employer]. [address] [place] [address], [place] [address]. All rights reserved. This information is not intended as a substitute for professional medical care. Always follow your healthcare professional's instructions.        What is Heart Failure?  The heart is a muscle that pumps oxygen-rich blood to all parts of the body. When you have heart failure, the heart is not able to pump as well as it should. Blood and fluid may back up into the lungs, and some parts of the body dont get enough oxygen-rich blood to work normally. These problems lead to the symptoms you feel.  When you have heart failure  With heart failure, not enough oxygen-rich blood leaves the heart with each beat. There are 2 types of heart failure. Both affect the ventricles ability to pump blood. You may have 1 or both types.       Systolic heart failure. The heart muscle becomes weak and enlarged. It cant pump enough oxygen-rich blood forward to the rest of the body when the ventricles contract. The measurement of how much blood your heart pushes out when it beats is called ejection fraction. In systolic heart failure, the ejection fraction is lower than normal. This can cause blood to back up into the lungs and cause shortness of breath and eventually ankle swelling (edema).  This is also called heart failure with reduced ejection fraction, or  HFrEF.    Diastolic heart failure. The heart muscle becomes stiff. It doesnt relax normally between contractions, which keeps the ventricles from filling with blood. Ejection fraction is often in the normal range. This can still lead to the backup of blood into the body and affect the organs such as the liver. This is also called heart failure with preserved ejection fraction or HFpEF.     Recognizing heart failure symptoms  When you have heart failure, you need to pay close attention to your body and how you  feel, every single day. That way, if a problem occurs, you can get help before it becomes too severe. You'll need to watch for changes in your symptoms. As long as symptoms stay about the same from one day to the next, your heart failure is stable. But if symptoms start to get worse, it's time to take action.  Signs and symptoms of worsening heart failure include:  · Rapid weight gain  · Shortness of breath  · Swelling (edema)  · Fatigue  How heart failure affects your body  When the heart doesn't pump enough blood, hormones (body chemicals) are sent to increase the amount of work the heart does. Some hormones make the heart grow larger. Others tell the heart to pump faster. As a result, the heart may pump more blood at first, but it can't keep up with the ongoing demands. So, the heart muscle becomes even more weak. Over time, even less blood is pumped through the heart. This leads to problems throughout the body as organs began to feel the effects of a long-term lack of oxygen. Eventually, if untreated, this can cause problems with your lungs, liver, kidneys, and loss of elasticity in the skin, and skin changes in the lower legs. A weak heart itself can eventually cause a severe decline in health and possible death if left untreated.  What is ejection fraction?  Ejection fraction (EF) measures how much blood the heart pumps out (ejects). This is measured to help diagnose heart failure. A healthy heart pumps at least half of the blood from the ventricles with each beat. This means a normal ejection fraction is around 50% to 70%. Your doctor will calculate ejection fraction from an echocardiogram.     My ejection fraction     Date: ______________________  Ejection fraction: _____________  Test used: __________________  Date Last Reviewed: 3/15/2016  © 1641-1195 [employer]. [address] [place] [address], [place] [address]. All rights reserved. This information is not intended as a substitute for professional  medical care. Always follow your healthcare professional's instructions.        Fall Prevention  Falls often occur due to slipping, tripping or losing your balance. Millions of people fall every year and injure themselves. Here are ways to reduce your risk of falling again.  · Think about your fall, was there anything that caused your fall that can be fixed, removed, or replaced?  · Make your home safe by keeping walkways clear of objects you may trip over.  · Use non-slip pads under rugs. Do not use area rugs or small throw rugs.  · Use non-slip mats in bathtubs and showers.  · Install handrails and lights on staircases.  · Do not walk in poorly lit areas.  · Do not stand on chairs or wobbly ladders.  · Use caution when reaching overhead or looking upward. This position can cause a loss of balance.  · Be sure your shoes fit properly, have non-slip bottoms and are in good condition.   · Wear shoes both inside and out. Avoid going barefoot or wearing slippers.  · Be cautious when going up and down stairs, curbs, and when walking on uneven sidewalks.  · If your balance is poor, consider using a cane or walker.  · If your fall was related to alcohol use, stop or limit alcohol intake.   · If your fall was related to use of sleeping medicines, talk to your doctor about this. You may need to reduce your dosage at bedtime if you awaken during the night to go to the bathroom.    · To reduce the need for nighttime bathroom trips:  ¨ Avoid drinking fluids for several hours before going to bed  ¨ Empty your bladder before going to bed  ¨ Men can keep a urinal at the bedside  · Stay as active as you can. Balance, flexibility, strength, and endurance all come from exercise. They all play a role in preventing falls. Ask your healthcare provider which types of activity are right for you.  · Get your vision checked on a regular basis.  · If you have pets, know where they are before you stand up or walk so you don't trip over  them.  · Use night lights.  Date Last Reviewed: 11/5/2015  © 9956-5233 [employer]. [address] [place] [address], [place] [address]. All rights reserved. This information is not intended as a substitute for professional medical care. Always follow your healthcare professional's instructions.

## 2017-03-10 NOTE — PROGRESS NOTES
3-10-17--Initial assessment and RN assessment completed. Interview conducted with patient. Patient reports that she resides with her spouse/caregiver . Patient is [place].  Patient reports that she is able to complete ADLs on her own, it just takes her time to complete tasks. Patient reports that she does require assistance with IADLs- which  assists with. Patient reports that she uses a walker to aid with ambulation. Patient reports that she does have a history of falls. We went over information about fall prevention and safety in home setting. I will mail educational literature about fall prevention and safety in home setting to patient/family, will review literature with patient/family at next phone call. Patient reports that she suffered a back injury in 1997. Patient reports that she wears a SQ pain pump which has her pain level at a manageable level. Patient reports that she is active with [employer]. Patient reports that she is interested in receiving additional assistance in home setting. Patient would like to find out if she could participate with a community  program. Patient reports that she also has financial difficulties. I will refer to [facility] for assistance with available resources. Patient reports that she has difficulties affording her medications. I will send a message in [place] to [facility] to refer patient for assistance. Patient reports that she weighs herself every day to check for fluid retention. Patient reports that her weight today was 151. Patient reports that she wears Unna boots to assist with swelling in her bilateral lower extremities. We went over information about CHF. We went over signs and symptoms of CHF flare up. We went over importance of daily monitoring and logging of weight. We went over importance of adherence to medication regimen. We went over importance of maintained follow-up with doctors. I will mail  educational literature about CHF to patient/family, will review literature with patient/family at next phone call.    Follow-up Plan:  -Continue to educate about CHF. Review signs and symptoms of CHF flare up. Encourage patient to follow medication and treatment regimen. Encourage patient to maintain follow up with doctors.  -Continue to educate about fall prevention and safety in home setting. Encourage patient to follow medication and treatment regimen. Encourage patient to maintain follow up with doctors.  --Collaborate with [facility] about available resources.  --Collaborate with [facility] about available resources.    Malcom MENDOZA CCM

## 2017-03-13 ENCOUNTER — OUTPATIENT CASE MANAGEMENT (OUTPATIENT)
Dept: ADMINISTRATIVE | Facility: OTHER | Age: 62
End: 2017-03-13

## 2017-03-13 NOTE — PROGRESS NOTES
An OPCM welcome Packet and consent form was created and mailed to the patient on 3/13/2017        Thank you,  Raegan Can, [facility]  Outpatient Case Management  Ext. [phone]

## 2017-03-13 NOTE — LETTER
March 13, 2017    Tasha Hawley  [address]  [place] [address]             Outpatient Case Management  [address]  [facility] [address] Dear Tasha Hawley:    We understand that receiving many services from different doctors and healthcare providers is overwhelming. There are appointments to make, transportation to arrange, dietary instructions to understand, and new medications to obtain.    This is where [facility] can help.     You are eligible to receive Outpatient Case Management services when you have healthcare needs that require the coordination of many providers, treatments, and services. You also qualify if you need assistance with a new treatment plan.     There is no charge for this support. You may have been referred to this program from your doctor(s), hospital staff member(s), or insurance company but you always have a choice to participate or not participate. To participate, you must give us your permission to be enrolled.     When you are enrolled in the [facility] program, the  who is assigned to you is    Mel Cantrell RN  [phone]    Depending on your needs and wishes, your  may speak with you by phone, visit you at your place of living (for example your home, skilled nursing facility, or rehabilitation facility), or meet you at your doctors office.     Your  will tell you why you have been selected to participate in the program and will complete an assessment of your needs. Then a personalized plan of care will be developed with you and or your caregiver.             Here are examples of the services your [facility]  provides.     Coordinate communication among multiple providers.   Arrange for transportation, doctors visits, durable medical equipment, home care services, and special clinics.    Provide coaching on how to manage your health condition.     Answer questions about your health condition.   Help you understand your doctors treatment plan.    Provide additional instruction about your health condition, treatments, and medications.    Help you obtain information about your insurance coverage.    Advocate for your individual needs.    Request a Licensed Clinical  (LCSW) to visit you if you need their services. LCSWs help with long term planning (discussing placement options, advanced planning directives), financial planning, and assistance (for example rent, utilities, medication funding).     Your  will coordinate their activities with other outpatient services you are receiving. All services provided by [facility]  are coordinated with and communicated to your primary care physician.    Our goal is to help you manage your health condition(s) safely within your living environment, whether that is your home or a medical facility. We want to help you function at the healthiest and highest level possible.     Sincerely,      Antonio Pachceo MD  Medical Director    Enclosures:    Frequently Asked Questions  Patient Rights and Responsibilities   Reporting a Grievance or Complaint  Consent/Release of Information  Stamped Addressed Envelope                  Frequently Asked Questions about [facility]    What is [facility]?  Outpatient Case management is not Home healthcare services. [facility]  do not provide hands-on care. [facility]  will work with your doctor to arrange for home health services, if needed. Home health services have a limited duration and there are some restrictions as to who can get these services. There is no prescribed limit to the amount of time you receive [facility] services. [facility]  are not agents of your insurance company. However, [facility]  can help you obtain information from your insurance company.     Who are the [facility] ?  [facility]  are Registered Nurses and Social Workers. It is important to remember that you and your  are a team that works together with your primary care physician to create your individualized plan of care. The ultimate goal of your care plan is to help you implement your doctors treatment plan and to help you function at the highest level of health possible.     What are my rights as a patient?  It is important for you to know and understand your rights and responsibilities while receiving services from the [facility] program. We have enclosed a complete description of your rights and responsibilities. You can help to make your care more effective when you understand your right and responsibilities.     What is needed to be enrolled in the program?  You are only enrolled in the [facility] Program when you give us your consent to participate. You will find enclosed a consent form. You are receiving this letter because you or your caregivers have given us a verbal consent to enroll you in [facility]. We ask that you sign and return the enclosed written consent in the stamped self-addressed envelope.                           Patient Rights and Responsibilities    We consider you a partner in your care. When you are well informed, participate in treatment decisions and communicate openly with your doctor and other healthcare professionals, you help make your care more effective.     While you are in the Outpatient Case Management Program, your rights include the following:     You have a right to be provided services in a non-discriminatory manner in accordance with the provisions of Title VI of the Civil Rights Act of 1964, Section 504 of the Rehabilitation Act of  1973, the Age Discrimination Act of 1975, the Americans with Disabilities Act as well as any other applicable Federal and State laws and regulations.     You have the right to a reasonable, timely response to your request or need for care, as well as the right to considerate and respectful care including an environment that preserves dignity and contributes to a positive self-image. You are responsible for being considerate and respectful of our staff.     You have a right to information regarding patient rights, advocacy services and complaint mechanisms, and the right to prompt resolution of any complaint. You or a designee has the right to participate in the resolution of ethical issues surrounding your care. You have a right to file a complaint if you feel that your rights have been infringed, without fear or penalty from [facility] or the federal government. You may file a complaint with the Director of Outpatient Case Management by calling [phone]. At any time, you may lodge a grievance with the [facility] by calling [phone], or the Joint Commission on Accreditation of Healthcare Organizations at [phone].     You, or someone acting on your behalf, have the right to understandable information on your health status, treatment and progress in order to make decisions. You have the right to know the nature, risks and alternatives to treatment. You have the right to be informed, when appropriate, regarding the outcome of the care that has been provided. You have the right to refuse treatment to the extent permitted by law, and the right to be informed of the alternatives and consequences of refusing treatment.     You, in collaboration with your physician, have the right to make decisions regarding care and the right to participate in the development and implementation of the plan of care and effective pain management. You have the right to know the name and  professional status of those responsible for the delivery of your care and treatment.       You have a right, within legal guidelines, to have a guardian, next-of-kin or legal designee exercises your patient rights when you are unable to do so. You have the right for your wishes regarding end-of-life decisions to be addressed by the healthcare team through advance directives. You have the right to personal privacy and confidentiality and to expect confidentiality of all records and communications pertaining to your care.      You have the right to receive communications about your health information confidentially. You have the right to request restrictions on the uses and disclosures of your health information. You have the right to inspect, copy, request amendments and receive an accounting of to whom we have disclosed your health information.     You have the right to be provided with interpretation services if you do not speak English; to alternative communication techniques if you are hearing or vision impaired; and to have any other resources needed on your behalf to ensure effective communication. These services are provided free of charge.     You have a right to personal safety (free from mental, physical, sexual and verbal abuse, neglect and exploitation). You have the right to access protective and advocacy services.     Advance Directives  A Patient Advocate is available to meet with patients to answer questions regarding advance directives.    Living Will  A document that outlines what medical treatment the patient does or does not want in the event the patient becomes unable to make those decisions at the appropriate time.    Durable Medical Power of   A document by which the patient designates an individual to be responsible for making medical decisions in the event the patient becomes unable to do so.    HIPAA Notice of Privacy Practices  Your medical information is governed by federal  privacy laws. HIPAA protects private medical information and how that information is disclosed. If you have a question regarding the HIPAA Notice of Privacy Practices, or if you believe your privacy rights have been violated, you may call our designated hotline at [phone].            Quality Improvement  Because we consistently strive to improve the care and service provided to our patients, we welcome your feedback. Your comments are an important part of our quality improvement process, as we like to know what we are doing right and which areas are in need of improvement. Our policy is to listen, be responsive and provide you with an appropriate and timely follow-up to your questions or concerns. Our goal is active patient and family involvement in all aspects of the care process.                                                                                  Reporting a Grievance or Complaint    During your time with the [facility] team you may have a grievance or complaint with our services. Your Patients Bill of Rights gives patients, families, and caregivers the right to express concerns and grievances and the right to expect a reasonable and timely response.     Your presentation of your concerns is not viewed negatively. It is an opportunity for us to improve the quality of our care and services we provide to you.     You may report your concerns directly to your , or you can phone in a complaint to:     Director of Outpatient Case Management  [phone]    You may also send a complaint letter to:    Director of Outpatient Case Management Services  [address] [place] [id number]    Tell us the details of your complaint and provide us with a contact phone number so we can contact you to obtain additional information. We will return a call to you within two business days of our receipt of your complaint, and to request additional information as  needed. If you choose to mail a letter, your complaint may take a few days longer to reach us.     All grievances will be addressed as quickly as possible. A grievance or complaint that involves situations or practices which place patients in immediate danger will be addressed as an urgent matter. We will work to resolve all other complaints within seven days of receipt. By that time, you will receive a phone call with either the resolution of your complaint, or a plan for corrective action. A formal written response will be sent to you within 30 days of receipt of your grievance.     If a resolution cannot be completed within 30 days, a letter will be sent to you or your family member with an estimated time for the final response.    Additionally, all patients have the right to file complaints with external agencies, without exception. Complaints/grievances can be addressed to the following agencies:            Patient Safety or Quality of Care Concerns  Office of Quality Monitoring   [facility] [place] [address]  [phone] Toll Free    HIPPA Privacy/Security Concerns  Office for Civil Rights Region IV  [place] Department of Health & Human Services  [address] [place] [address], [address]  [place] [address] [phone] Phone  [phone] TDD  [phone] Toll Free    Medicare/Medicaid Billing Concerns  [place] for Medicare & Medicaid Services  Region [address]  [address] [place] [address], [address]  [place] [address] [phone] Phone  [phone] Toll Free    General Concerns  [place] [facility] ([facility])  [phone] Toll Free Complaint Hotline                                                              Consent Form/Release of Information    By signing--     (1) I agree I have read the Outpatient Case Management information provided to me;     (2) I agree to voluntarily participate in the Outpatient Case Management program;     (3) I understand I  must consent to participation in the Outpatient Case Management program during my first interview with my ;    (4) I consent to the discussion and release of my personal health information to my healthcare team (including my personal physician, my medical home care team, any specialty physician(s), and my [facility] team);     (5) I agree my consent is valid for the length of time I am receiving Outpatient Case Management;    (6) I agree to referrals to community resources which my Case Management team recommends for me. I agree to the release of my personal information and personal health information as necessary to referral sources.    ___________________________________________________________________  Patients Printed Name     ___________________________________________________________________  Patients Signature       Date    If patient is in being cared for, please complete this section:     ___________________________________________________________________  Printed Name of Person Caring For Patient   Relationship To Patient    ___________________________________________________________________   Signature of Person Caring For Patient     Date    PLEASE SIGN AND RETURN IN THE ENCLOSED PRE-ADDRESSED ENVELOPE.

## 2017-03-15 ENCOUNTER — OUTPATIENT CASE MANAGEMENT (OUTPATIENT)
Dept: ADMINISTRATIVE | Facility: OTHER | Age: 62
End: 2017-03-15

## 2017-03-16 NOTE — PROGRESS NOTES
3/16/17: LCSW contacted pt to complete SW assessment.  Pt was currently seeing a doctor but  was able to complete SW assessment.  The pt is a 60 yo female with hx of major depressive disorder - single episode, anxiety; reason for referral is pt wants help in the home through a  program and has financial issues that impact health care and daily living.  Pt's  talked about how their medical expenses ($50 office visit co-pays, medications) has caused them financial difficulties.  Pt's  stated how they are currently in the process of applying to [facility] Financial Assistance program.  Pt's  self-reports his wife is receiving home health through [facility]; he also states how his wife is in the process of applying to [facility] for additional services.  Pt is currently receiving treatment for her depression from Psychiatrist (Dr. Oconnor).

## 2017-03-20 NOTE — PLAN OF CARE
[facility] will not be able to service pt therefore  send referral to [facility]. Karine isaac.    Rosemarie Mathias, [facility]  [id number]   <<----- Click to add NO pertinent Past Medical History No pertinent past medical history

## 2017-03-21 ENCOUNTER — OUTPATIENT CASE MANAGEMENT (OUTPATIENT)
Dept: ADMINISTRATIVE | Facility: OTHER | Age: 62
End: 2017-03-21

## 2017-03-21 NOTE — PROGRESS NOTES
3-21-17--Attempt to follow-up for Outpatient Care Management; left message requesting return call.Malcom MENDOZA CCM      Follow-up Plan:  -Continue to educate about CHF. Review signs and symptoms of CHF flare up. Encourage patient to follow medication and treatment regimen. Encourage patient to maintain follow up with doctors.  -Continue to educate about fall prevention and safety in home setting. Encourage patient to follow medication and treatment regimen. Encourage patient to maintain follow up with doctors.  --Collaborate with [facility] about available resources.  --Collaborate with [facility] about available resources.    Malcom MENDOZA CCM

## 2017-03-22 ENCOUNTER — OUTPATIENT CASE MANAGEMENT (OUTPATIENT)
Dept: ADMINISTRATIVE | Facility: OTHER | Age: 62
End: 2017-03-22

## 2017-03-22 NOTE — PROGRESS NOTES
3/22/17: LCSW contacted pt for follow up.  Pt self-reports continuing to be in chronic pain due to multiple back surgeries and falls.  Pt inquired about receiving more PT and OT; explained to pt how an order would need to be made by her PCP and encouraged her to bring it up to him at her appointment tomorrow.  Pt also inquired about home care services; LCSW explained to pt how many home care agencies do not accept insurance(s), only long-term policies, and normally require a 5 hour minimum.  Pt does not believe she will be able to pay for home care services as her and her  are on a fixed income.  [facility] will research home care agencies within the [facility].  Pt expressed an interest in receiving in-home behavioral health therapy.  [facility] will research behavioral health agencies who make therapeutic home visits within the [facility].       3/16/17: LCSW contacted pt to complete SW assessment.  Pt was currently seeing a doctor but  was able to complete SW assessment.  The pt is a 62 yo female with hx of major depressive disorder - single episode, anxiety; reason for referral is pt wants help in the home through a  program and has financial issues that impact health care and daily living.  Pt's  talked about how their medical expenses ($50 office visit co-pays, medications) has caused them financial difficulties.  Pt's  stated how they are currently in the process of applying to [employer] Financial Assistance program.  Pt's  self-reports his wife is receiving home health through [facility]; he also states how his wife is in the process of applying to [facility] for additional services.  Pt is currently receiving treatment for her depression from Psychiatrist (Dr. Oconnor).

## 2017-03-23 ENCOUNTER — OFFICE VISIT (OUTPATIENT)
Dept: INTERNAL MEDICINE | Facility: CLINIC | Age: 62
End: 2017-03-23
Payer: MEDICARE

## 2017-03-23 VITALS
HEIGHT: 64 IN | BODY MASS INDEX: 26.64 KG/M2 | HEART RATE: 77 BPM | SYSTOLIC BLOOD PRESSURE: 102 MMHG | TEMPERATURE: 98 F | DIASTOLIC BLOOD PRESSURE: 60 MMHG | WEIGHT: 156.06 LBS

## 2017-03-23 DIAGNOSIS — G89.4 CHRONIC PAIN SYNDROME: ICD-10-CM

## 2017-03-23 DIAGNOSIS — F41.9 ANXIETY: ICD-10-CM

## 2017-03-23 DIAGNOSIS — R29.6 FREQUENT FALLS: ICD-10-CM

## 2017-03-23 DIAGNOSIS — K59.03 DRUG-INDUCED CONSTIPATION: ICD-10-CM

## 2017-03-23 DIAGNOSIS — Z79.899 POLYPHARMACY: ICD-10-CM

## 2017-03-23 DIAGNOSIS — G47.00 INSOMNIA, UNSPECIFIED TYPE: Primary | ICD-10-CM

## 2017-03-23 PROBLEM — R53.83 LETHARGY: Status: RESOLVED | Noted: 2017-02-01 | Resolved: 2017-03-23

## 2017-03-23 PROBLEM — R55 SYNCOPE AND COLLAPSE: Status: RESOLVED | Noted: 2017-03-02 | Resolved: 2017-03-23

## 2017-03-23 PROCEDURE — 99499 UNLISTED E&M SERVICE: CPT | Mod: S$GLB,,, | Performed by: INTERNAL MEDICINE

## 2017-03-23 PROCEDURE — 99214 OFFICE O/P EST MOD 30 MIN: CPT | Mod: S$GLB,,, | Performed by: INTERNAL MEDICINE

## 2017-03-23 PROCEDURE — 1160F RVW MEDS BY RX/DR IN RCRD: CPT | Mod: S$GLB,,, | Performed by: INTERNAL MEDICINE

## 2017-03-23 PROCEDURE — 99999 PR PBB SHADOW E&M-EST. PATIENT-LVL III: CPT | Mod: PBBFAC,,, | Performed by: INTERNAL MEDICINE

## 2017-03-23 NOTE — MR AVS SNAPSHOT
[facility]  [address]  [facility] [address]  Phone: [phone]  Fax: [phone]                  Tasha Hawley   3/23/2017 10:00 AM   Office Visit    Description:  Female : 1955   Provider:  Mesfin Hodges II, MD   Department:  [facility]           Reason for Visit     Chronic Pain     Hypothyroidism           Diagnoses this Visit        Comments    Chronic pain syndrome    -  Primary     Anxiety         Drug-induced constipation         Frequent falls                To Do List           Future Appointments        Provider Department Dept Phone    3/29/2017 2:00 PM ALEXY Taylor [facility] - Neurosurgery 7th Fl [phone]    2017 10:00 AM Mesfin Hodges II, MD [facility] - Internal Medicine [phone]      Goals (5 Years of Data)     Patient/caregiver will have adequate mental health support/resources prior to discharge from [facility].     Notes - Note created  3/16/2017  9:27 AM by Robert Delgado III, LCSW    Overall Time to Completion  2 months from 2017    Short Term Goals  Patient/caregiver will verbalize understanding of Depression and ways to effectively/safely manage depression within 1 month.  Interventions   · Collaborate with care team member as appropriate to meet patient needs.  · Collaborate with OPCM RN as appropriate to meet patient needs.  · Collaborate with physician as appropriate to meet patient needs  · Encourage communication with providers.  · Encourage compliance with medical/mental health appointments.  · Encourage family/social  and involvement in care.  · Provide crisis intervention hotline.  · Provide mental/behavioral health resource(s).  · Provide supportive counseling.  · Recognize and provide educational material (JAI).   Status  · Partially met            Patient/caregiver will have an action plan in place to manage and prevent complications of CHF prior to discharge from O[facility].      Notes - Note edited  3/10/2017 10:27 AM by Mel Cantrell RN    Overall Time to Completion  1 month from 03/10/2017    Short Term Goals  Patient/caregiver will verbalize 2 signs and symptoms of Congestive Heart Failure within 2 weeks.   Interventions   · Recognize and provide educational material (JAI).  · Assess for availability of working scale in home setting.  · Mail Weight log for home use.   Status  · Partially met     Clinical Reference Documents Added to Patient Instructions       Document    FALL PREVENTION (ENGLISH)    HEART FAILURE, WHAT IS (ENGLISH)    HEART FAILURE: TRACKING YOUR WEIGHT (ENGLISH)    HEART FAILURE: WARNING SIGNS OF A FLARE-UP (ENGLISH)              Patient/caregiver will have knowledge of resources available in order to obtain the services that are needed prior to discharge from O[facility].     Notes - Note edited  3/10/2017 10:36 AM by Mel Cantrell RN    Overall Time to Completion  1 month from 03/10/2017    Short Term Goals  Patient/caregiver will notify RN CCM if patient does not hear from OPCM  and PAP within 2 weeks.  Interventions   · Refer to [facility] Assistance [facility].  · Refer to Outpatient Case Management Social Worker.   Status  · Partially met          Patient/caregiver will have knowledge of resources available in order to obtain the services that are needed prior to discharge from [facility].     Notes - Note created  3/16/2017  9:21 AM by Robert Delgado III, LCSW    Overall Time to Completion  2 months from 03/16/2017    Short Term Goals  Patient/caregiver will identify 3 supports or services to maintain or improve current functional status within 1 month.  Interventions   · Collaborate with care team member as appropriate to meet patient needs.  · Collaborate with external provider as appropriate to meet patient needs.   · Collaborate with OPCM RN as appropriate to meet patient needs.  · Collaborate with physician as appropriate to meet patient  needs  · Complete community search for available resources on Agency:  agency or Home Care Services or volunteer program.  · Discuss patient care needs and potential barriers.  · Encourage communication with providers.  · Encourage compliance with medical/mental health appointments.  · Encourage family/social  and involvement in care.  · Provide family support resource(s).  · Provide information on alternative levels of care.  · Provide information on home care services.   Status  · Partially met            Patient/caregiver will have Safety Plan in place prior to discharge from OPCM.     Notes - Note edited  3/10/2017 10:27 AM by Mel Cantrell RN    Overall Time to Completion  1 month from 03/10/2017    Short Term Goals  Patient/caregiver will verbalize importance of having a safe home environment by making sure rooms are well lit and removing throw rugs within 2 weeks.  Interventions   · Recognize and provide educational material (JAI).   Status  · Partially met       Clinical Reference Documents Added to Patient Instructions       Document    FALL PREVENTION (ENGLISH)    HEART FAILURE, WHAT IS (ENGLISH)    HEART FAILURE: TRACKING YOUR WEIGHT (ENGLISH)    HEART FAILURE: WARNING SIGNS OF A FLARE-UP (ENGLISH)                Follow-Up and Disposition     Return in about 4 weeks (around 4/20/2017).      [facility] On [facility]     [facility] On Call Nurse Care Line - 24/7 Assistance  Registered nurses in the [facility] On Call Center provide clinical advisement, health education, appointment booking, and other advisory services.  Call for this free service at [phone].             Medications           Message regarding Medications     Verify the changes and/or additions to your medication regime listed below are the same as discussed with your clinician today.  If any of these changes or additions are incorrect, please notify your healthcare provider.        STOP taking these medications      "hyoscyamine (ANASPAZ,LEVSIN) 0.125 mg Tab TAKE 1 TABLET (125 MCG TOTAL) BY MOUTH EVERY 6 (SIX) HOURS AS NEEDED.           Verify that the below list of medications is an accurate representation of the medications you are currently taking.  If none reported, the list may be blank. If incorrect, please contact your healthcare provider. Carry this list with you in case of emergency.           Current Medications     alprazolam (XANAX) 0.25 MG tablet Take 1 tablet (0.25 mg total) by mouth once daily.    aspirin (ECOTRIN) 81 MG EC tablet Take 1 tablet (81 mg total) by mouth once daily.    atorvastatin (LIPITOR) 80 MG tablet Take 1 tablet (80 mg total) by mouth once daily.    buPROPion (WELLBUTRIN XL) 150 MG TB24 tablet Take 2 tablets (300 mg total) by mouth once daily.    BUTALBITAL/ASPIRIN/CAFFEINE (FIORINAL ORAL) Take by mouth as needed.    docusate sodium (COLACE) 100 MG capsule Take 1 capsule (100 mg total) by mouth 3 (three) times daily as needed for Constipation.    doxycycline (MONODOX) 100 MG capsule     fluoxetine (PROZAC) 20 MG capsule Take 2 capsules (40 mg total) by mouth once daily.    hydrocodone-acetaminophen 10-325mg (NORCO)  mg Tab Take 1 tablet by mouth every 6 (six) hours as needed.    levothyroxine (SYNTHROID) 100 MCG tablet Take 1 tablet (100 mcg total) by mouth before breakfast.    ondansetron (ZOFRAN) 8 MG tablet Take 1 tablet (8 mg total) by mouth every 12 (twelve) hours as needed for Nausea.    pantoprazole (PROTONIX) 40 MG tablet Take 1 tablet (40 mg total) by mouth once daily.    polyethylene glycol (GLYCOLAX) 17 gram PwPk Take 17 g by mouth 2 (two) times daily.    SENNOSIDES (SENNA LAX ORAL) Take by mouth as needed.           Clinical Reference Information           Your Vitals Were     BP Pulse Temp Height Weight Last Period    102/60 (BP Location: Left arm, Patient Position: Sitting, BP Method: Manual) 77 97.9 °F (36.6 °C) (Oral) 5' 4" (1.626 m) 70.8 kg (156 lb 1.4 oz) (LMP Unknown)    " BMI                26.79 kg/m2          Blood Pressure          Most Recent Value    BP  102/60      Allergies as of 3/23/2017     Imitrex [Sumatriptan Succinate]    Penicillins    Percocet [Oxycodone-acetaminophen]    Topamax [Topiramate]    Vancomycin    (D)-limonene Flavor    Bactrim [Sulfamethoxazole-trimethoprim]    Butorphanol Tartrate    Darvocet A500 [Propoxyphene N-acetaminophen]    Fentanyl    Phenytoin Sodium Extended    White Petrolatum-zinc    Zinc Oxide-white Petrolatum    Latex, Natural Rubber      Immunizations Administered on Date of Encounter - 3/23/2017     None      Language Assistance Services     ATTENTION: Language assistance services are available, free of charge. Please call [phone].      ATENCIÓN: Si habla israel, tiene a morgan disposición servicios gratuitos de asistencia lingüística. Llame al [phone].     CHÚ Ý: N?u b?n nói Ti?ng Vi?t, có các d?ch v? h? tr? ngôn ng? mi?n phí dành cho b?n. G?i s? [phone].         [facility] complies with applicable Federal civil rights laws and does not discriminate on the basis of race, color, national origin, age, disability, or sex.

## 2017-03-23 NOTE — PROGRESS NOTES
"Subjective:       Patient ID: Tasha Hawley is a 61 y.o. female.    Chief Complaint: Chronic Pain and Hypothyroidism    HPI - Mrs. Hawley came for a short turnaround appointment to manage polypharmacy and did not bring in her medication list.  While we reconciled her list from her memory, she mentioned she was still taking trazodone as we wrapped up the visit.  It's not really clear what she's taking at home.    She's still not sleeping well.  Not attempting to go to bed by MN as we planned after the last visit. Says she has "racing thoughts" when she tries to go to bed earlier than 3am. She's cut down her pm dose of xanax, rather than increasing it (which would be more appropriate).  She says she's taking 3 different antidepressants (trazodone, wellbutrin and prozac).  Concerned that her mood is agitated and "mean" to her , who is sole source of support.    She saw neurosurgery to address chronic pain, but she had had a fall on her head and the urgent issue took priority over the chronic issue.  She has follow up next week with them.    Fatigued all the time. Pain is worse today than normal, but in range of her usual state.  She's constipated despite intermittent use of three treatments for this.  PT course finished; she has exercises to do at home.      PMH:  Insomnia with daytime fatigue  Chronic low back pain with narcotic use.  Indwelling narcotic pump; reduced dose  History CVA with dysarthria  Neurogenic bladder, with recurrent UTI's.  SP catheter placed Nov 2016  Hypokalemia  Hypothyroidism  CECI, not on CPAP at the moment  CAD, with ACS in Jan 2016 - cc showed subtot mid LAD lesion without PCI; ischemic CM with EF 40% and chronic LE edema. Followed by [facility]  Anxiety and Depression  Chronic constipation, likely d/t ongoing narcotic use  Intermittent nausea     Meds: Long, involved list. Reviewed and reconciled in EPIC with patient during visit today, but still unsure what she " actually has at home.    Review of Systems   Constitutional: Negative for fever.   HENT: Negative for congestion.    Respiratory: Negative for shortness of breath.    Cardiovascular: Negative for chest pain.   Gastrointestinal: Positive for constipation.   Genitourinary: Negative for difficulty urinating.   Musculoskeletal: Positive for arthralgias, gait problem, myalgias and neck pain.   Skin: Negative for rash.   Neurological: Negative for headaches.   Psychiatric/Behavioral: Positive for agitation, dysphoric mood and sleep disturbance. Negative for suicidal ideas. The patient is nervous/anxious.        Objective:      Physical Exam   Constitutional: She appears well-developed and well-nourished. No distress.   Dysphoric WF with wide-ranging list of complaints; hard to focus   Pulmonary/Chest: Effort normal.   Neurological: She is alert.   Skin: She is not diaphoretic.   Psychiatric: She has a normal mood and affect.   Nursing note and vitals reviewed.      Assessment:       1. Chronic pain syndrome    2. Anxiety    3. Drug-induced constipation    4. Frequent falls        Plan:       Tasha was seen today for chronic pain and hypothyroidism.    Diagnoses and all orders for this visit:    Insomnia, unspecified type - unstable, worsening. Important to gaining control of all other symptoms.  Increase xanax to 1/2 of 0.25 and try to get to bed by MN.  If that doesn't work after a week, increase to 0.25 dose.  Decrease daytime xanax to off.  Continue trazodone and other medications for now.    Polypharmacy - still unstable.  having a hard time paring this down b/c I don't have a good list. Bring all pill bottles to clinic next time.    Chronic pain syndrome - uncontrolled today.  F/u with neurosurgery and her pain management doctor    Anxiety - also a  of her other problems    Drug-induced constipation - stable.  Start using all three treatments every day    Frequent falls - stable.  Multifactorial    rtc one  month    G Sriram Hodges MD MPH  Staff Internist

## 2017-03-24 DIAGNOSIS — N30.00 ACUTE CYSTITIS WITHOUT HEMATURIA: Primary | ICD-10-CM

## 2017-03-27 ENCOUNTER — TELEPHONE (OUTPATIENT)
Dept: UROLOGY | Facility: CLINIC | Age: 62
End: 2017-03-27

## 2017-03-27 NOTE — TELEPHONE ENCOUNTER
----- Message from Sherrie Modi LPN sent at 3/24/2017  3:07 PM CDT -----  Contact: pt [phone]      ----- Message -----     From: Suyapa Batista     Sent: 3/24/2017   2:08 PM       To: [facility] Staff    Pt is asking to speak with Dr Mayo's nurse stating she think she may be getting a bladder infection. Pt states the last 3 days her urine is dark, burning and seeing stuff in her urine. Please call pt.

## 2017-03-28 RX ORDER — FLUOXETINE HYDROCHLORIDE 20 MG/1
CAPSULE ORAL
Qty: 60 CAPSULE | Refills: 1 | Status: SHIPPED | OUTPATIENT
Start: 2017-03-28 | End: 2017-04-26 | Stop reason: SDUPTHER

## 2017-03-28 RX ORDER — ALPRAZOLAM 0.5 MG/1
TABLET ORAL
Qty: 60 TABLET | Refills: 1 | Status: SHIPPED | OUTPATIENT
Start: 2017-03-28 | End: 2017-04-26

## 2017-03-28 RX ORDER — BUPROPION HYDROCHLORIDE 150 MG/1
TABLET ORAL
Qty: 60 TABLET | Refills: 1 | Status: SHIPPED | OUTPATIENT
Start: 2017-03-28 | End: 2017-04-26 | Stop reason: SDUPTHER

## 2017-03-29 ENCOUNTER — OFFICE VISIT (OUTPATIENT)
Dept: NEUROSURGERY | Facility: CLINIC | Age: 62
End: 2017-03-29
Payer: MEDICARE

## 2017-03-29 VITALS
WEIGHT: 179.88 LBS | SYSTOLIC BLOOD PRESSURE: 120 MMHG | BODY MASS INDEX: 30.71 KG/M2 | TEMPERATURE: 97 F | DIASTOLIC BLOOD PRESSURE: 71 MMHG | HEIGHT: 64 IN | HEART RATE: 59 BPM

## 2017-03-29 DIAGNOSIS — N31.9 NEUROGENIC BLADDER: ICD-10-CM

## 2017-03-29 DIAGNOSIS — G89.4 CHRONIC PAIN SYNDROME: ICD-10-CM

## 2017-03-29 DIAGNOSIS — M41.9 SCOLIOSIS OF LUMBAR SPINE, UNSPECIFIED SCOLIOSIS TYPE: ICD-10-CM

## 2017-03-29 DIAGNOSIS — M47.26 OSTEOARTHRITIS OF SPINE WITH RADICULOPATHY, LUMBAR REGION: ICD-10-CM

## 2017-03-29 DIAGNOSIS — M54.16 LUMBAR RADICULOPATHY: ICD-10-CM

## 2017-03-29 DIAGNOSIS — R20.2 PARESTHESIA OF BOTH LOWER EXTREMITIES: ICD-10-CM

## 2017-03-29 DIAGNOSIS — Z98.890 S/P INSERTION OF INTRATHECAL PUMP: ICD-10-CM

## 2017-03-29 DIAGNOSIS — M51.36 DEGENERATIVE DISC DISEASE, LUMBAR: ICD-10-CM

## 2017-03-29 DIAGNOSIS — R29.898 WEAKNESS OF BOTH LOWER EXTREMITIES: ICD-10-CM

## 2017-03-29 DIAGNOSIS — R29.6 FREQUENT FALLS: ICD-10-CM

## 2017-03-29 DIAGNOSIS — Z96.89 S/P INSERTION OF SPINAL CORD STIMULATOR: ICD-10-CM

## 2017-03-29 DIAGNOSIS — I89.0 LYMPHEDEMA OF BOTH LOWER EXTREMITIES: Primary | ICD-10-CM

## 2017-03-29 DIAGNOSIS — M47.816 FACET ARTHROPATHY, LUMBAR: ICD-10-CM

## 2017-03-29 PROCEDURE — 99999 PR PBB SHADOW E&M-EST. PATIENT-LVL V: CPT | Mod: PBBFAC,,, | Performed by: PHYSICIAN ASSISTANT

## 2017-03-29 PROCEDURE — 1160F RVW MEDS BY RX/DR IN RCRD: CPT | Mod: S$GLB,,, | Performed by: PHYSICIAN ASSISTANT

## 2017-03-29 PROCEDURE — 99215 OFFICE O/P EST HI 40 MIN: CPT | Mod: S$GLB,,, | Performed by: PHYSICIAN ASSISTANT

## 2017-03-29 NOTE — LETTER
March 31, 2017      Mesfin Hodges II, MD  [address]  [facility] [address]           [facility]  [address]  [facility] [address]  Phone: [phone]          Patient: Tasha Hawley   MR Number: [id number]   YOB: 1955   Date of Visit: 3/29/2017       Dear Dr. Mesfin Hodges II:    Thank you for referring Tasha Hawley to me for evaluation. Attached you will find relevant portions of my assessment and plan of care.    If you have questions, please do not hesitate to call me. I look forward to following Tasha Hawley along with you.    Sincerely,    ALEXY Taylor    Enclosure  CC:  No Recipients    If you would like to receive this communication electronically, please contact [email or web address] or [phone] to request more information on [email or web address] Link access.    For providers and/or their staff who would like to refer a patient to [facility], please contact us through our one-stop-shop provider referral line, [facility], at [phone].    If you feel you have received this communication in error or would no longer like to receive these types of communications, please e-mail [email or web address]

## 2017-03-30 ENCOUNTER — OUTPATIENT CASE MANAGEMENT (OUTPATIENT)
Dept: ADMINISTRATIVE | Facility: OTHER | Age: 62
End: 2017-03-30

## 2017-03-30 NOTE — PROGRESS NOTES
3/30/17: LCSW contacted [facility] for information on their referral process; was told by representative that pt could call and they would complete an assessment over the phone with them.  LCSW contacted pt for follow up; pt stated she did not have a good doctor's visit yesterday; she would not elaborate.  LCSW gave pt the number to [facility] ([phone]) to complete an assessment.  LCSW will follow up with pt next week.     3/22/17: LCSW contacted pt for follow up.  Pt self-reports continuing to be in chronic pain due to multiple back surgeries and falls.  Pt inquired about receiving more PT and OT; explained to pt how an order would need to be made by her PCP and encouraged her to bring it up to him at her appointment tomorrow.  Pt also inquired about home care services; SUMITW explained to pt how many home care agencies do not accept insurance(s), only long-term policies, and normally require a 5 hour minimum.  Pt does not believe she will be able to pay for home care services as her and her  are on a fixed income.  [facility] will research home care agencies within the [facility].  Pt expressed an interest in receiving in-home behavioral health therapy.  [facility] will research behavioral health agencies who make therapeutic home visits within the [facility].       3/16/17: LCSW contacted pt to complete SW assessment.  Pt was currently seeing a doctor but  was able to complete SW assessment.  The pt is a 60 yo female with hx of major depressive disorder - single episode, anxiety; reason for referral is pt wants help in the home through a  program and has financial issues that impact health care and daily living.  Pt's  talked about how their medical expenses ($50 office visit co-pays, medications) has caused them financial difficulties.  Pt's  stated how they are currently in the process of applying to [facility] Financial  Assistance program.  Pt's  self-reports his wife is receiving home health through [facility]; he also states how his wife is in the process of applying to [facility] for additional services.  Pt is currently receiving treatment for her depression from Psychiatrist (Dr. Oconnor).

## 2017-03-31 ENCOUNTER — TELEPHONE (OUTPATIENT)
Dept: INTERNAL MEDICINE | Facility: CLINIC | Age: 62
End: 2017-03-31

## 2017-03-31 ENCOUNTER — OUTPATIENT CASE MANAGEMENT (OUTPATIENT)
Dept: ADMINISTRATIVE | Facility: OTHER | Age: 62
End: 2017-03-31

## 2017-03-31 DIAGNOSIS — R53.81 DEBILITY: Primary | ICD-10-CM

## 2017-03-31 PROBLEM — M41.9 SCOLIOSIS DEFORMITY OF SPINE: Status: ACTIVE | Noted: 2017-03-31

## 2017-03-31 PROBLEM — Z98.890 S/P INSERTION OF INTRATHECAL PUMP: Status: ACTIVE | Noted: 2017-03-31

## 2017-03-31 PROBLEM — M51.369 DEGENERATIVE DISC DISEASE, LUMBAR: Status: ACTIVE | Noted: 2017-03-31

## 2017-03-31 PROBLEM — M47.26 OSTEOARTHRITIS OF SPINE WITH RADICULOPATHY, LUMBAR REGION: Status: ACTIVE | Noted: 2017-03-31

## 2017-03-31 PROBLEM — M51.36 DEGENERATIVE DISC DISEASE, LUMBAR: Status: ACTIVE | Noted: 2017-03-31

## 2017-03-31 PROBLEM — R29.898 WEAKNESS OF BOTH LOWER EXTREMITIES: Status: ACTIVE | Noted: 2017-03-31

## 2017-03-31 PROBLEM — Z96.89 S/P INSERTION OF SPINAL CORD STIMULATOR: Status: ACTIVE | Noted: 2017-03-31

## 2017-03-31 PROBLEM — M47.816 FACET ARTHROPATHY, LUMBAR: Status: ACTIVE | Noted: 2017-03-31

## 2017-03-31 PROBLEM — R20.2 PARESTHESIA OF BOTH LOWER EXTREMITIES: Status: ACTIVE | Noted: 2017-03-31

## 2017-03-31 NOTE — TELEPHONE ENCOUNTER
----- Message from Mel Cantrell RN sent at 3/31/2017 12:48 PM CDT -----    Hi. This is Mel Cantrell RN. I am with the Outpatient case management team with [facility]. I received a referral on the above patient. I spoke with patient today on the telephone.    Patient reports that her bilateral lower extremities are swollen to twice their normal size, she feels like she is holding on to fluid.     Patient is requesting for PT and OT be added to her home health orders with [facility]. If you are in agreement please fax orders to [facility] at [phone].      Please notify patient of your recommendations.     Thank you,  Mel Cantrell RN\

## 2017-03-31 NOTE — PROGRESS NOTES
[facility] - Neurosurgery 7th Fl  Established Patient  Neurosurgery    SUBJECTIVE:     Chief Complaint/Reason for Admission: Back and leg pain    History of Present Illness:  Ms. Hawley is a 61 y.o. female with a PMHx of chronic neck and back pain, with a dilaudid pain pump, s/p suprapubic catheter, GERD, hx CVA, possible MI, on ASA/Plavix, anxiety, RA, HLD, and hypothyroidism, who presents to clinic today with complaints of continued and severe back and leg pain. She has an extensive surgical history, with multiple lumbar fusions and revisions by multiple surgeons at OSH, the first being in 1997 after a lumbar fracture. She also had a SCS placed by Dr. Hillman, but approximately 10 years ago, the battery pack was removed, but the leads were left in place. She is s/p placement of an IT dialudid pain pump approximately 5 years ago by Dr. Hillman and s/p C4-7 ACDF on 5/13/13 by Dr. Martinez. She is a very poor historian and is unable to provide any further detail about procedures or dates.     She was last seen in NSGY clinic on 3/1/17 by Raegan Gifford PA-C. At that time, patient had extensive facial trauma after a fall. STAT head CT was ordered, which was negative for hemorrhage. She was told to follow up in clinic with xrays at a later date, once recovered from her fall. She was seen in the ED the next day after a syncopal episode. Cards and syncope workup was negative. Syncope was attributed to accidental overdose. She has a history of multiple accidental overdoses, contributed to her IT pain pump. She is on PO Hydrocodone but only takes 1 every 6 hours.  has large notebook with log of PO medications in clinic. She was last seen by Dr. Hillman on 3/6, who dialed her pump from 3.37 mg/day to 2.86 mg/day. Patient continues to report severe and debilitating neck and back pain that are constant. Back and leg pain are more severe than neck and arm pain. She has pain along her entire lumbar spine, L=R, that  radiates throughout her entire BLE. She also has numbness, tingling and burning throughout her BLE. Per patient, anything increases her pain and nothing decreases her pain. She has weakness in her BLE but is unsure how much is due to the extensive edema and how much is due to her back. She has a supra pubic catheter in place. Denies bowel incontinence but does have difficulty getting to the restroom due to an inability to walk. Patient is interested in surgical intervention to improve her back pain.         (Not in a hospital admission)    Review of patient's allergies indicates:   Allergen Reactions    Imitrex [sumatriptan succinate] Shortness Of Breath    Penicillins Shortness Of Breath     Other reaction(s): Jittery    Percocet [oxycodone-acetaminophen] Anaphylaxis    Topamax [topiramate] Shortness Of Breath    Vancomycin Shortness Of Breath     Rash    (d)-limonene flavor      Other reaction(s): difficult intubation  Other reaction(s): Jittery  Other reaction(s): Difficulty breathing  Other reaction(s): Difficulty breathing  Other reaction(s): Jittery  Other reaction(s): Difficulty breathing    Bactrim [sulfamethoxazole-trimethoprim] Nausea And Vomiting     Other reaction(s): Resp Depression  Other reaction(s): Anaphylaxis    Butorphanol tartrate     Darvocet a500 [propoxyphene n-acetaminophen]      Other reaction(s): Jittery  Other reaction(s): Difficulty breathing    Fentanyl      Other reaction(s): Vomiting  Other reaction(s): Nausea  Other reaction(s): Itching  swelling    Phenytoin sodium extended      pATIENT DENIES EVER HAVING THIS MEDICATION    White petrolatum-zinc     Zinc oxide-white petrolatum      Other reaction(s): Difficulty breathing    Latex, natural rubber Itching and Rash       Past Medical History:   Diagnosis Date    Allergy     Anxiety     Arthritis     Carotid artery occlusion     Cataract     Depression     Edema     chronic    Fever blister     Hypothyroid      Iron deficiency anemia     Joint pain     Lumbar radiculopathy     with chronic pain    Ocular migraine     Sleep apnea     cpap     Past Surgical History:   Procedure Laterality Date    BACK SURGERY      x 12    CATARACT EXTRACTION W/  INTRAOCULAR LENS IMPLANT Left     Dr Coleman     HYSTERECTOMY  1975    endometriosis    pain pump placement      SPINE SURGERY  5-13-13    CERVICAL FUSION     Family History   Problem Relation Age of Onset    Cancer Mother 55     breast    Cancer Father      esophagus,had laryngectomy    Parkinsonism Maternal Grandmother     Tremor Maternal Grandmother     No Known Problems Brother     No Known Problems Brother     Heart disease Maternal Uncle     No Known Problems Sister     No Known Problems Maternal Aunt     No Known Problems Paternal Aunt     No Known Problems Paternal Uncle     No Known Problems Maternal Grandfather     No Known Problems Paternal Grandmother     No Known Problems Paternal Grandfather     Melanoma Neg Hx     Amblyopia Neg Hx     Blindness Neg Hx     Cataracts Neg Hx     Diabetes Neg Hx     Glaucoma Neg Hx     Hypertension Neg Hx     Macular degeneration Neg Hx     Retinal detachment Neg Hx     Strabismus Neg Hx     Stroke Neg Hx     Thyroid disease Neg Hx      Social History   Substance Use Topics    Smoking status: Never Smoker    Smokeless tobacco: Never Used    Alcohol use No      Comment: denies        OBJECTIVE:     Vital Signs (Most Recent):  Temp: 97 °F (36.1 °C) (03/29/17 1411)  Pulse: (!) 59 (03/29/17 1411)  BP: 120/71 (03/29/17 1411)    Physical Exam:  General: well developed, appears older than stated age, moderate distress 2/2 pain, cries easily  Head: normocephalic, atraumatic  Neurologic: Alert and oriented. Thought content appropriate  GCS: Motor: 6/Verbal: 5/Eyes: 4 GCS Total: 15  Mental Status: Awake, Alert, Oriented x 4  Language: No aphasia  Speech: Mild dysarthria  Cranial nerves: right facial weakness,  tongue midline. R ear deaf, left ear decreased hearing (congenital), otherwise CN II-XII grossly intact.  Eyes: pupils equal, round, reactive to light with accommodation, EOMI.   Pulmonary: normal respirations, not labored, no accessory muscles used  Abdomen: soft, non-distended, not tender to palpation  Sensory: intact to light touch in BUE. Unable to assess BLE due to boots and BLE lymphedema.    Motor Strength: Decreased movement of BLE 2/2 severe BLE lymphedema, R>L. Compression boots on bilaterally. Unable to apply resistance to BLE to assess strength due to boots and BLE lymphedema. Generalized deconditioning. No abnormal movements seen.      Strength   Deltoids Triceps Biceps Wrist Extension Wrist Flexion Hand    Upper: R 5-/5 5-/5 5-/5 5-/5 5-/5 5-/5     L 4-/5 5-/5 5-/5 5-/5 5-/5 5-/5       Iliopsoas Quadriceps Knee  Flexion Tibialis  anterior Gastro- cnemius EHL   Lower: R 3/5 3/5 3/5 3/5 3/5 3/5     L 3/5 3/5 3/5 3/5 3/5 3/5       DTR's - 2 + and symmetric in BUE. Unable to assess BLE due to lymphedema  Underwood: present bilaterally  Clonus: unable to assess due to lymphedema  Skin: BUE are warm, dry and intact. Unable to assess BLE.   Straight leg raise: unable to assess due to lymphedema  Gait: unable to assess due to lymphedema. Patient in wheel chair.   Lumbar ROM: partially able to assess. Limited 2/2 pain. Pain with all ROM.   Large incision midline back with palpable hardware  TTP of entire lumbar spine, midline and along paraspinal muscles.          Diagnostic Results:  Xray lumbar spine:  I independently reviewed the imaging.     There is a dextroconvex curvature of the lumbar spine.  There is a baclofen pump and a posterior column stimulator.  There are pedicle screws fixation rods and disc space material at L3-L4 and L4-L5.  There is a 29 degree dextroconvex curvature of the L-spine.  No pars defect seen.  There is moderate DJD.    ASSESSMENT/PLAN:     Assessment:  Ms. Hawley is a 61  y.o. female with a PMHx of chronic neck and back pain, with a dilaudid pain pump, s/p suprapubic catheter, GERD, hx CVA, possible MI, on ASA/Plavix, anxiety, RA, HLD, and hypothyroidism, who presents to clinic today with complaints of continued and severe back and leg pain. She has an extensive surgical history, with multiple lumbar fusions and revisions by multiple surgeons at OSH, the first being in 1997 after a lumbar fracture. She also had a SCS placed by Dr. Hillman, but approximately 10 years ago, the battery pack was removed, but the leads were left in place. She is s/p placement of an IT dialudid pain pump approximately 5 years ago by Dr. Hillman and s/p C4-7 ACDF on 5/13/13 by Dr. Martinez. Despite multiple interventions over the past 20 years, patient has continued to have severe back and leg pain.     Plan:  -Unable to truly assess patient's LE strength and sensation today 2/2 boots and lymphedema  -Xray lumbar spine show post surgical changes, along with a dextroconvex curve.   -Patient begins to cry when I am unable to give her a surgical consultation today. She states that she needs surgery to make the pain go away. Discussion had with patient and  that her pain is chronic and she has been dealing with it for many years. She has had multiple spinal procedures and is currently on chronic opioids. There is no surgical option that can make her any where near pain free and having that expectation is unrealistic. Instead, we need to work up her pain and determine if there is anything that can be done to maybe help her pain become more manageable, not to make it resolve. Patient and  voiced understanding.   -Will get complete xray scoli films. Since patient unable to stand, will perform seated.   -Bone scan with tech 99 to guide facet injections  -CT lumbar spine to evaluate the bone and hardware  -Referral to Dr. Borges with [facility]  -Continue management of pain pump per   Rafy  -Follow up with Dr. Angeles in clinic once all imaging is completed  -Encouraged patient and  to call the clinic with any questions or concerns prior to follow up appt      Nettie Matta PA-C   Neurosurgery   Pager: [phone]

## 2017-03-31 NOTE — PROGRESS NOTES
3-31-17--Follow-up for [facility] completed. Interview conducted with patient. Patient reports that she is okay.  Patient reports that she uses a walker to aid with ambulation. Patient denies any recent falls. We went over information about fall prevention and safety in home setting.  Patient reports that she suffered a back injury in 1997. Patient reports that she wears a SQ pain pump. Patient reports that her pain pump was decreased on this week and that her pain is more intense than it had been. Patient reports that she spoke to her pain management MD and MD was calling in pain patch and pain cream for her to begin to use to help to alleviate pain. We went over information about the bodies response to pain.  We went over importance of adherence to medication regimen. We went over importance of maintained follow-up with doctors. I will mail educational literature about the bodies response to pain to patient/family, will review literature with patient/family at next phone call. Patient reports that she is active with [facility]. Patient is requesting for PT and OT be restarted with . Patient reports that she is working with [facility]. Patient reports that she weighs herself every day to check for fluid retention. Patient reports that she is unsure of her weight on today. Patient reports that she wears Unna boots to assist with swelling in her bilateral lower extremities. Patient reports that her bilateral LE are swollen to double their normal size. Patient reports that she feels that she is holding on to fluid.  We went over information about CHF. We went over signs and symptoms of CHF flare up. We went over importance of daily monitoring and logging of weight. We went over importance of adherence to medication regimen. We went over importance of maintained follow-up with doctors.I will send a message to  to alert of bilateral LE swelling and that patient is requesting  PT and OT.      Follow-up Plan:  -Continue to educate about CHF. Review signs and symptoms of CHF flare up. Encourage patient to follow medication and treatment regimen. Encourage patient to maintain follow up with doctors.  -Continue to educate about fall prevention and safety in home setting. Encourage patient to follow medication and treatment regimen. Encourage patient to maintain follow up with doctors.  -Continue to educate about the bodies response to pain. Encourage patient to follow medication and treatment regimen. Encourage patient to maintain follow up with doctors.  _Follow-up with  RE: Patient reports Bilateral LE are double their normal size. And patient requesting for PT and OT to be re started with her home health.  --Collaborate with [facility] about available resources.  -Follow-up to see if Bone scan  and CT lumbar spine scheduled sent to [facility] notifying that test needed to be scheduled.    Malcom MENDOZA CCM

## 2017-03-31 NOTE — PATIENT INSTRUCTIONS
Relieving Back Pain  Back pain is a common problem. You can strain back muscles by lifting too much weight or just by moving the wrong way. Back strain can be uncomfortable, even painful. And it can take weeks or months to improve. To help yourself feel better and prevent future back strains, try these tips.  Important Note: Do not give aspirin to children or teens without first discussing it with your healthcare provider.      ? Ice    Ice reduces muscle pain and swelling. It helps most during the first 24 to 48 hours after an injury.  · Wrap an ice pack or a bag of frozen peas in a thin towel. (Never place ice directly on your skin.)  · Place the ice where your back hurts the most.  · Dont ice for more than 20 minutes at a time.  · You can use ice several times a day.  ? Medicines  Over-the-counter pain relievers can include acetaminophen and anti-inflammatory medicines, which includes aspirin or ibuprofen. They can help ease discomfort. Some also reduce swelling.  · Tell your healthcare provider about any medicines you are already taking.  · Take medicines only as directed.  ? Heat  After the first 48 hours, heat can relax sore muscles and improve blood flow.  · Try a warm bath or shower. Or use a heating pad set on low. To prevent a burn, keep a cloth between you and the heating pad.  · Dont use a heating pad for more than 15 minutes at a time. Never sleep on a heating pad.  Date Last Reviewed: 9/1/2015  © 2013-9772 [employer]. [address] [place] [address], [place] [address]. All rights reserved. This information is not intended as a substitute for professional medical care. Always follow your healthcare professional's instructions.        Pain Management: Chronic  You have a painful condition that has required frequent use of opioid pain medicine. Your healthcare provider wants you to receive the best possible care for your problem. To achieve this, you must have a personal doctor who can supervise  "a treatment plan for you.  You may contact one of the doctors whose name has been given to you. Or you may locate a personal doctor on your own.  If your doctor determines that you need pain medicine on an emergency basis, he or she should provide you with a pain contract. This is a letter from your doctor that describes what pain medicine you may receive, how much, and how often. You sign it, agreeing to the terms of the treatment plan. Bring this with you each time you come to this facility. It will allow you to get the proper treatment with minimal delay.  Note  In the future, you will not be able to receive opioid pain medicine from this facility without a pain contract or telephone approval from your personal healthcare provider.  Your doctor must update your pain contract often. If it is not current, this facility may not accept it. A pain contract does not guarantee that you will get the medicines you request. The doctor treating you has the right to withhold medicines if he or she thinks that it is advisable.   Date Last Reviewed: 4/28/2015  © 7271-5991 [employer]. [address] [place] [address], [place] [address]. All rights reserved. This information is not intended as a substitute for professional medical care. Always follow your healthcare professional's instructions.        The Cycle of Chronic Pain  Pain can affect virtually all parts of your life. Your sleep, mood, activity, and energy level can all be disrupted by pain. Being tired, depressed, and out of shape can make the pain worse and harder to cope with. So a "pain cycle" begins.       Note for family and friends  It can be difficult to help care for a friend or family member with chronic pain. Talk to a health care provider or mental health professional who can help you learn how to care for yourself and your loved one in a healthy manner.   Date Last Reviewed: 6/11/2015  © 5007-6460 [employer]. [address] [place] [address] Road, " ALE[place] Flores [address]. All rights reserved. This information is not intended as a substitute for professional medical care. Always follow your healthcare professional's instructions.

## 2017-04-03 ENCOUNTER — TELEPHONE (OUTPATIENT)
Dept: PAIN MEDICINE | Facility: CLINIC | Age: 62
End: 2017-04-03

## 2017-04-03 NOTE — TELEPHONE ENCOUNTER
Called and spoke with pt today about scheduling an appointment with Dr. Borges but, pt states she have a psychiatrist at [facility].

## 2017-04-03 NOTE — TELEPHONE ENCOUNTER
----- Message from Luara Borges MA sent at 3/31/2017  3:14 PM CDT -----  Good afternoon,    When you have a moment, can you please schedule this patient for a consult with Dr. Borges? Thank you!

## 2017-04-05 ENCOUNTER — TELEPHONE (OUTPATIENT)
Dept: NEUROSURGERY | Facility: CLINIC | Age: 62
End: 2017-04-05

## 2017-04-05 NOTE — TELEPHONE ENCOUNTER
----- Message from Cami Prasad sent at 4/5/2017  2:30 PM CDT -----  Contact: Pt  Pt states she missed a call from someone at this office and is returning that call.    Pt contact number [phone]  Thanks

## 2017-04-05 NOTE — TELEPHONE ENCOUNTER
Spoke with pt. Advised pt that she is scheduled for a CT and bone scan on 4/10/2017. Additionally advised pt that she is scheduled for a scoliosis X-ray and an appointment with Dr. Angeles on 5/2/2017. Advised pt that she will be contacted by Dr. Borges's office to schedule an appointment as well. Appointment slips in the mail. Pt verbalized understanding.

## 2017-04-06 ENCOUNTER — OUTPATIENT CASE MANAGEMENT (OUTPATIENT)
Dept: ADMINISTRATIVE | Facility: OTHER | Age: 62
End: 2017-04-06

## 2017-04-06 NOTE — PROGRESS NOTES
4/6/17: LCSW contacted pt for follow up.  Pt self-reports being in a lot of pain today.  LCSW asked pt if she had a chance to contact [facility] yet; she stated she has not because she forgot where she wrote down the number.  LCSW gave pt the number to [facility] ([phone]) to schedule an assessment.  LCSW also gave pt the number to [facility] ([phone]) for 24/7 crisis counseling.  LCSW encouraged pt to follow up with [facility] and contact [facility] if needed.     3/30/17: LCSW contacted [facility] for information on their referral process; was told by representative that pt could call and they would complete an assessment over the phone with them.  LCSW contacted pt for follow up; pt stated she did not have a good doctor's visit yesterday; she would not elaborate.  LCSW gave pt the number to [facility] ([phone]) to complete an assessment.  LCSW will follow up with pt next week.     3/22/17: LCSW contacted pt for follow up.  Pt self-reports continuing to be in chronic pain due to multiple back surgeries and falls.  Pt inquired about receiving more PT and OT; explained to pt how an order would need to be made by her PCP and encouraged her to bring it up to him at her appointment tomorrow.  Pt also inquired about home care services; SUMITW explained to pt how many home care agencies do not accept insurance(s), only long-term policies, and normally require a 5 hour minimum.  Pt does not believe she will be able to pay for home care services as her and her  are on a fixed income.  KAUR will research home care agencies within the [facility].  Pt expressed an interest in receiving in-home behavioral health therapy.  KAUR will research behavioral health agencies who make therapeutic home visits within the [facility].       3/16/17: KAUR contacted pt to complete SW assessment.  Pt was currently seeing a doctor but  was able to complete SW  assessment.  The pt is a 62 yo female with hx of major depressive disorder - single episode, anxiety; reason for referral is pt wants help in the home through a  program and has financial issues that impact health care and daily living.  Pt's  talked about how their medical expenses ($50 office visit co-pays, medications) has caused them financial difficulties.  Pt's  stated how they are currently in the process of applying to [facility]'s Financial Assistance program.  Pt's  self-reports his wife is receiving home health through [facility]; he also states how his wife is in the process of applying to [facility] for additional services.  Pt is currently receiving treatment for her depression from Psychiatrist (Dr. Oconnor).

## 2017-04-07 ENCOUNTER — OUTPATIENT CASE MANAGEMENT (OUTPATIENT)
Dept: ADMINISTRATIVE | Facility: OTHER | Age: 62
End: 2017-04-07

## 2017-04-07 ENCOUNTER — TELEPHONE (OUTPATIENT)
Dept: UROLOGY | Facility: CLINIC | Age: 62
End: 2017-04-07

## 2017-04-07 ENCOUNTER — TELEPHONE (OUTPATIENT)
Dept: PAIN MEDICINE | Facility: CLINIC | Age: 62
End: 2017-04-07

## 2017-04-07 DIAGNOSIS — N30.90 BLADDER INFECTION: Primary | ICD-10-CM

## 2017-04-07 RX ORDER — NITROFURANTOIN 25; 75 MG/1; MG/1
100 CAPSULE ORAL 2 TIMES DAILY
Qty: 14 CAPSULE | Refills: 0 | Status: SHIPPED | OUTPATIENT
Start: 2017-04-07 | End: 2017-04-14

## 2017-04-07 NOTE — PROGRESS NOTES
4-7-17--Attempt to follow-up for Outpatient Care Management; left message requesting return call.Malcom MENDOZA [facility]    Follow-up Plan:  -Continue to educate about CHF. Review signs and symptoms of CHF flare up. Encourage patient to follow medication and treatment regimen. Encourage patient to maintain follow up with doctors.  -Continue to educate about fall prevention and safety in home setting. Encourage patient to follow medication and treatment regimen. Encourage patient to maintain follow up with doctors.  -Continue to educate about the bodies response to pain. Encourage patient to follow medication and treatment regimen. Encourage patient to maintain follow up with doctors.  _Follow-up with  RE: Patient reports Bilateral LE are double their normal size. And patient requesting for PT and OT to be re started with her home health.  --Collaborate with [facility] about available resources.  -Follow-up to see if Bone scan  and CT lumbar spine scheduled sent to [facility] notifying that test needed to be scheduled.    Malcom MENDOZA [facility]

## 2017-04-07 NOTE — TELEPHONE ENCOUNTER
----- Message from Laura Borges MA sent at 4/5/2017  2:38 PM CDT -----  Good afternoon,  I just spoke to the patient and explained why she was referred to Dr. Borges and she verbalized understanding and agreed to an appointment. If you would be able to schedule her for a consult whenever you have a chance, that would be great.  Thanks!   ----- Message -----     From: Emil Adkins MA     Sent: 4/3/2017   1:08 PM       To: Karthik Jones Staff    Called and spoke with patient about scheduling appointment with Dr. Borges for consult and pt states she has a psychiatrist at [facility]. She did not ask for another. Pt will not schedule an appointment.    ----- Message -----     From: Laura Borges MA     Sent: 3/31/2017   3:14 PM       To: Katerina Weber    Good afternoon,    When you have a moment, can you please schedule this patient for a consult with Dr. Borges? Thank you!

## 2017-04-10 ENCOUNTER — HOSPITAL ENCOUNTER (OUTPATIENT)
Dept: RADIOLOGY | Facility: HOSPITAL | Age: 62
Discharge: HOME | End: 2017-04-10
Attending: NEUROLOGICAL SURGERY
Payer: MEDICARE

## 2017-04-10 DIAGNOSIS — M47.816 FACET ARTHROPATHY, LUMBAR: ICD-10-CM

## 2017-04-10 DIAGNOSIS — I89.0 LYMPHEDEMA OF BOTH LOWER EXTREMITIES: ICD-10-CM

## 2017-04-10 DIAGNOSIS — R29.898 WEAKNESS OF BOTH LOWER EXTREMITIES: ICD-10-CM

## 2017-04-10 DIAGNOSIS — Z96.89 S/P INSERTION OF SPINAL CORD STIMULATOR: ICD-10-CM

## 2017-04-10 DIAGNOSIS — M47.26 OSTEOARTHRITIS OF SPINE WITH RADICULOPATHY, LUMBAR REGION: ICD-10-CM

## 2017-04-10 DIAGNOSIS — M41.9 SCOLIOSIS OF LUMBAR SPINE, UNSPECIFIED SCOLIOSIS TYPE: ICD-10-CM

## 2017-04-10 DIAGNOSIS — G89.4 CHRONIC PAIN SYNDROME: ICD-10-CM

## 2017-04-10 DIAGNOSIS — N31.9 NEUROGENIC BLADDER: ICD-10-CM

## 2017-04-10 DIAGNOSIS — R20.2 PARESTHESIA OF BOTH LOWER EXTREMITIES: ICD-10-CM

## 2017-04-10 DIAGNOSIS — M51.36 DEGENERATIVE DISC DISEASE, LUMBAR: ICD-10-CM

## 2017-04-10 DIAGNOSIS — Z98.890 S/P INSERTION OF INTRATHECAL PUMP: ICD-10-CM

## 2017-04-10 DIAGNOSIS — M54.16 LUMBAR RADICULOPATHY: ICD-10-CM

## 2017-04-10 DIAGNOSIS — R29.6 FREQUENT FALLS: ICD-10-CM

## 2017-04-10 PROCEDURE — 72131 CT LUMBAR SPINE W/O DYE: CPT | Mod: 26,,, | Performed by: RADIOLOGY

## 2017-04-10 PROCEDURE — 72131 CT LUMBAR SPINE W/O DYE: CPT | Mod: TC

## 2017-04-10 PROCEDURE — 78320 NM BONE SCAN SPECT: CPT | Mod: 26,,, | Performed by: NUCLEAR MEDICINE

## 2017-04-10 PROCEDURE — A9503 TC99M MEDRONATE: HCPCS

## 2017-04-12 ENCOUNTER — TELEPHONE (OUTPATIENT)
Dept: PAIN MEDICINE | Facility: CLINIC | Age: 62
End: 2017-04-12

## 2017-04-12 NOTE — TELEPHONE ENCOUNTER
----- Message from Harmony Borges MD sent at 4/10/2017  2:11 PM CDT -----  ok  ----- Message -----     From: Emil Adkins MA     Sent: 4/7/2017   5:03 PM       To: Harmony Borges MD    Called and spoke with pt today about scheduling an appointment with you for coping Chronic pain, and other, please see referral by Dr. Robert Wooten.    ----- Message -----     From: Laura Borges MA     Sent: 4/5/2017   2:38 PM       To: Emil Adkins MA    Good afternoon,  I just spoke to the patient and explained why she was referred to Dr. Borges and she verbalized understanding and agreed to an appointment. If you would be able to schedule her for a consult whenever you have a chance, that would be great.  Thanks!   ----- Message -----     From: Emil Adkins MA     Sent: 4/3/2017   1:08 PM       To: Karthik Jones Staff    Called and spoke with patient about scheduling appointment with Dr. Borges for consult and pt states she has a psychiatrist at [facility]. She did not ask for another. Pt will not schedule an appointment.    ----- Message -----     From: Laura Borges MA     Sent: 3/31/2017   3:14 PM       To: Katerina Weber    Good afternoon,    When you have a moment, can you please schedule this patient for a consult with Dr. Borges? Thank you!

## 2017-04-13 ENCOUNTER — TELEPHONE (OUTPATIENT)
Dept: UROLOGY | Facility: CLINIC | Age: 62
End: 2017-04-13

## 2017-04-13 ENCOUNTER — OFFICE VISIT (OUTPATIENT)
Dept: PAIN MEDICINE | Facility: CLINIC | Age: 62
End: 2017-04-13
Payer: MEDICARE

## 2017-04-13 VITALS
TEMPERATURE: 98 F | HEART RATE: 59 BPM | BODY MASS INDEX: 30.71 KG/M2 | WEIGHT: 179.88 LBS | HEIGHT: 64 IN | DIASTOLIC BLOOD PRESSURE: 62 MMHG | RESPIRATION RATE: 15 BRPM | SYSTOLIC BLOOD PRESSURE: 109 MMHG

## 2017-04-13 DIAGNOSIS — F11.20 OPIOID TYPE DEPENDENCE, CONTINUOUS USE: Primary | ICD-10-CM

## 2017-04-13 PROCEDURE — 1160F RVW MEDS BY RX/DR IN RCRD: CPT | Mod: S$GLB,,, | Performed by: PSYCHIATRY & NEUROLOGY

## 2017-04-13 PROCEDURE — 99214 OFFICE O/P EST MOD 30 MIN: CPT | Mod: S$GLB,,, | Performed by: PSYCHIATRY & NEUROLOGY

## 2017-04-13 PROCEDURE — 99499 UNLISTED E&M SERVICE: CPT | Mod: S$GLB,,, | Performed by: PSYCHIATRY & NEUROLOGY

## 2017-04-13 PROCEDURE — 99999 PR PBB SHADOW E&M-EST. PATIENT-LVL III: CPT | Mod: PBBFAC,,, | Performed by: PSYCHIATRY & NEUROLOGY

## 2017-04-13 NOTE — LETTER
April 25, 2017      Robert Angeles MD  [address]  [facility] [address]           [facility]  [address]  [facility] [address]  Phone: [phone]  Fax: [phone]          Patient: Tasha Hawley   MR Number: [id number]   YOB: 1955   Date of Visit: 4/13/2017       Dear Dr. Robert Angeles:    Thank you for referring Tasha Hawley to me for evaluation. Attached you will find relevant portions of my assessment and plan of care.    If you have questions, please do not hesitate to call me. I look forward to following Tasha Hawley along with you.    Sincerely,    Harmony Borges MD    Enclosure  CC:  No Recipients    If you would like to receive this communication electronically, please contact [email or web address] or [phone] to request more information on [employer] access.    For providers and/or their staff who would like to refer a patient to [facility], please contact us through our one-stop-shop provider referral line, [facility], at [phone].    If you feel you have received this communication in error or would no longer like to receive these types of communications, please e-mail [email or web address]

## 2017-04-13 NOTE — PROGRESS NOTES
"Outpatient Psychiatry Initial Visit (MD/NP)    4/13/2017    Tasha Hawley, a 61 y.o. female, presenting for initial evaluation visit. Met with patient.    Reason for Encounter: Consult from Dr Angeles. Patient complains of pain and depression    Pt appeared sedated during the appointment so it was very difficult to get a history from her. She tried to answer some questions but I could not really understand her.      History of Present Illness: Anxiety  Patient is here for evaluation of anxiety.  She has the following anxiety symptoms: "depression, anxiety , pain" Onset of symptoms was approximately several years ago.  Symptoms have been unchanged since that time. She denies current suicidal and homicidal ideation. Family history significant for unsure.Possible organic causes contributing are: chronic pain. Risk factors: previous episode of depression Previous treatment includes pt has been following up with Dr Oconnor.   She complains of the following medication side effects: none.    Depression  Patient complains of depression. She complains of . Onset was approximately several years ago. Symptoms have been unchanged since that time. Current symptoms include: depressed mood, difficulty concentrating, fatigue, feelings of worthlessness/guilt, hopelessness and insomnia. Patient denies recurrent thoughts of death, suicidal attempt, suicidal thoughts with specific plan and suicidal thoughts without plan. Family history significant for unsure. Possible organic causes contributing are: chronic pain. Risk factors: previous episode of depression and chronic illness. Previous treatment includes medication. She complains of the following side effects from the treatment: none.         Per chart has been seeing Dr Oconnor for management of her MDD and Anxiety for several years now. That she has several medical conditions that she is dealing with and that sometimes it gets to be frustrating. That she is in a lot of pain " "all the time and has been dealing with the chronic pain for years. Per chart about 10 yrs ago, pt had SCS but it did not help. That about 5 yra ago she had IT pain pump placed which is still being managed by Dr Hillman. That she had a fall this March and she has been in a lot of pain since. Also has PO Norco to use. Unsure if pt is taking her medications as prescribed or not as she appeared sedated today.     I have reviewed the chart for details of Developmenta and social history but was not able to get information from the pt.     Review Of Systems:     GENERAL:  No weight gain or loss  SKIN:  No rashes or lacerations  HEAD:  No headaches  EYES:  No exophthalmos, jaundice or blindness  EARS:  No dizziness, tinnitus or hearing loss  NOSE:  No changes in smell  MOUTH & THROAT:  No dyskinetic movements or obvious goiter  CHEST:  No shortness of breath, hyperventilation or cough  CARDIOVASCULAR:  No tachycardia or chest pain  ABDOMEN:  No nausea, vomiting, pain, constipation or diarrhea  URINARY:  No frequency, dysuria or sexual dysfunction  ENDOCRINE:  No polydipsia, polyuria  MUSCULOSKELETAL:  (+) severe back pain or stiffness of the joints  NEUROLOGIC:  No weakness, sensory changes, seizures, confusion, memory loss, tremor or other abnormal movements    Current Evaluation:     Nutritional Screening: Considering the patient's height and weight, medications, medical history and preferences, should a referral be made to the dietitian? no    Constitutional  Vitals:  Most recent vital signs, dated less than 90 days prior to this appointment, were reviewed.    Vitals:    04/13/17 1227   BP: 109/62   Pulse: (!) 59   Resp: 15   Temp: 97.8 °F (36.6 °C)   TempSrc: Oral   Weight: 81.6 kg (179 lb 14.3 oz)   Height: 5' 4" (1.626 m)        General:  older than stated age, casually dressed, seated in wheelchair     Musculoskeletal  Muscle Strength/Tone:  no tremor, no tic   Gait & Station:  in wheelchair     Psychiatric  Speech:  " "slowed, increased latency of response   Mood & Affect:  " in pain" pt appeared seadated  congruent and appropriate   Thought Process:  Poverty of thought   Associations:  intact   Thought Content:  poverty of content, very focused on pain   Insight:  limited awareness of illness   Judgement: behavior is adequate to circumstances   Orientation:  person, place   Memory: intact for content of interview   Language: grossly intact   Attention Span & Concentration:  able to focus, distracted   Fund of Knowledge:  intact and appropriate to age and level of education, familiar with aspects of current personal life       Relevant Elements of Neurological Exam: uses a wheelchair    Functioning in Relationships:  Spouse/partner:   Peers: few  Employers: None    Laboratory Data  No visits with results within 1 Month(s) from this visit.  Latest known visit with results is:    Admission on 03/02/2017, Discharged on 03/03/2017   Component Date Value Ref Range Status    CPK 03/02/2017 75  20 - 180 U/L Final    Troponin I 03/02/2017 0.010  0.000 - 0.026 ng/mL Final    Specimen UA 03/02/2017 Urine, Catheterized   Final    Color, UA 03/02/2017 Yellow  Yellow, Straw, [place]ola Final    Appearance, UA 03/02/2017 Clear  Clear Final    pH, UA 03/02/2017 8.0  5.0 - 8.0 Final    Specific Gravity, UA 03/02/2017 1.010  1.005 - 1.030 Final    Protein, UA 03/02/2017 Negative  Negative Final    Glucose, UA 03/02/2017 Negative  Negative Final    Ketones, UA 03/02/2017 Negative  Negative Final    Bilirubin (UA) 03/02/2017 Negative  Negative Final    Occult Blood UA 03/02/2017 Negative  Negative Final    Nitrite, UA 03/02/2017 Negative  Negative Final    Urobilinogen, UA 03/02/2017 Negative  <2.0 EU/dL Final    Leukocytes, UA 03/02/2017 Negative  Negative Final    Blood Culture, Routine 03/02/2017 No growth after 5 days.   Final    Urine Culture, Routine 03/02/2017 No growth   Final    Sodium 03/02/2017 143  136 - 145 mmol/L " Final    Potassium 03/02/2017 3.7  3.5 - 5.1 mmol/L Final    Chloride 03/02/2017 103  95 - 110 mmol/L Final    CO2 03/02/2017 33* 23 - 29 mmol/L Final    Glucose 03/02/2017 81  70 - 110 mg/dL Final    BUN, Bld 03/02/2017 7  6 - 20 mg/dL Final    Creatinine 03/02/2017 0.8  0.5 - 1.4 mg/dL Final    Calcium 03/02/2017 9.3  8.7 - 10.5 mg/dL Final    Total Protein 03/02/2017 6.8  6.0 - 8.4 g/dL Final    Albumin 03/02/2017 3.5  3.5 - 5.2 g/dL Final    Total Bilirubin 03/02/2017 0.4  0.1 - 1.0 mg/dL Final    Alkaline Phosphatase 03/02/2017 108  55 - 135 U/L Final    AST 03/02/2017 16  10 - 40 U/L Final    ALT 03/02/2017 12  10 - 44 U/L Final    Anion Gap 03/02/2017 7* 8 - 16 mmol/L Final    eGFR if African American 03/02/2017 >60  >60 mL/min/1.73 m^2 Final    eGFR if non African American 03/02/2017 >60  >60 mL/min/1.73 m^2 Final    Benzodiazepines 03/02/2017 Negative   Final    Methadone metabolites 03/02/2017 Negative   Final    Cocaine (Metab.) 03/02/2017 Negative   Final    Opiate Scrn, Ur 03/02/2017 Presumptive Positive   Final    Barbiturate Screen, Ur 03/02/2017 Presumptive Positive   Final    Amphetamine Screen, Ur 03/02/2017 Negative   Final    THC 03/02/2017 Negative   Final    Phencyclidine 03/02/2017 Negative   Final    Creatinine, Random Ur 03/02/2017 22.6  15.0 - 325.0 mg/dL Final    Toxicology Information 03/02/2017 SEE COMMENT   Final    WBC 03/02/2017 4.79  3.90 - 12.70 K/uL Final    RBC 03/02/2017 3.72* 4.00 - 5.40 M/uL Final    Hemoglobin 03/02/2017 10.1* 12.0 - 16.0 g/dL Final    Hematocrit 03/02/2017 32.9* 37.0 - 48.5 % Final    MCV 03/02/2017 88  82 - 98 fL Final    MCH 03/02/2017 27.2  27.0 - 31.0 pg Final    MCHC 03/02/2017 30.7* 32.0 - 36.0 % Final    RDW 03/02/2017 13.4  11.5 - 14.5 % Final    Platelets 03/02/2017 218  150 - 350 K/uL Final    MPV 03/02/2017 10.5  9.2 - 12.9 fL Final    Gran # 03/02/2017 3.3  1.8 - 7.7 K/uL Final    Lymph # 03/02/2017 1.1  1.0 -  4.8 K/uL Final    Mono # 03/02/2017 0.4  0.3 - 1.0 K/uL Final    Eos # 03/02/2017 0.1  0.0 - 0.5 K/uL Final    Baso # 03/02/2017 0.01  0.00 - 0.20 K/uL Final    Gran% 03/02/2017 68.1  38.0 - 73.0 % Final    Lymph% 03/02/2017 22.5  18.0 - 48.0 % Final    Mono% 03/02/2017 7.5  4.0 - 15.0 % Final    Eosinophil% 03/02/2017 1.5  0.0 - 8.0 % Final    Basophil% 03/02/2017 0.2  0.0 - 1.9 % Final    Differential Method 03/02/2017 Automated   Final    EF 03/03/2017 55  55 - 65 Final    Mitral Valve Regurgitation 03/03/2017 TRIVIAL   Final    Diastolic Dysfunction 03/03/2017 No   Final    Est. PA Systolic Pressure 03/03/2017 38.25   Final    Mitral Valve Mobility 03/03/2017 NORMAL   Final    Tricuspid Valve Regurgitation 03/03/2017 TRIVIAL   Final    Troponin I 03/02/2017 <0.006  0.000 - 0.026 ng/mL Final    Troponin I 03/03/2017 <0.006  0.000 - 0.026 ng/mL Final    Magnesium 03/03/2017 1.8  1.6 - 2.6 mg/dL Final    Phosphorus 03/03/2017 3.1  2.7 - 4.5 mg/dL Final    Sodium 03/03/2017 140  136 - 145 mmol/L Final    Potassium 03/03/2017 3.8  3.5 - 5.1 mmol/L Final    Chloride 03/03/2017 104  95 - 110 mmol/L Final    CO2 03/03/2017 31* 23 - 29 mmol/L Final    Glucose 03/03/2017 137* 70 - 110 mg/dL Final    BUN, Bld 03/03/2017 8  6 - 20 mg/dL Final    Creatinine 03/03/2017 0.9  0.5 - 1.4 mg/dL Final    Calcium 03/03/2017 8.6* 8.7 - 10.5 mg/dL Final    Anion Gap 03/03/2017 5* 8 - 16 mmol/L Final    eGFR if African American 03/03/2017 >60  >60 mL/min/1.73 m^2 Final    eGFR if non African American 03/03/2017 >60  >60 mL/min/1.73 m^2 Final    WBC 03/03/2017 3.79* 3.90 - 12.70 K/uL Final    RBC 03/03/2017 3.46* 4.00 - 5.40 M/uL Final    Hemoglobin 03/03/2017 9.3* 12.0 - 16.0 g/dL Final    Hematocrit 03/03/2017 31.1* 37.0 - 48.5 % Final    MCV 03/03/2017 90  82 - 98 fL Final    MCH 03/03/2017 26.9* 27.0 - 31.0 pg Final    MCHC 03/03/2017 29.9* 32.0 - 36.0 % Final    RDW 03/03/2017 13.6  11.5 - 14.5  % Final    Platelets 03/03/2017 188  150 - 350 K/uL Final    MPV 03/03/2017 10.5  9.2 - 12.9 fL Final    Gran # 03/03/2017 1.7* 1.8 - 7.7 K/uL Final    Lymph # 03/03/2017 1.5  1.0 - 4.8 K/uL Final    Mono # 03/03/2017 0.4  0.3 - 1.0 K/uL Final    Eos # 03/03/2017 0.1  0.0 - 0.5 K/uL Final    Baso # 03/03/2017 0.00  0.00 - 0.20 K/uL Final    Gran% 03/03/2017 45.8  38.0 - 73.0 % Final    Lymph% 03/03/2017 40.4  18.0 - 48.0 % Final    Mono% 03/03/2017 11.1  4.0 - 15.0 % Final    Eosinophil% 03/03/2017 2.4  0.0 - 8.0 % Final    Basophil% 03/03/2017 0.0  0.0 - 1.9 % Final    Differential Method 03/03/2017 Automated   Final    Troponin I 03/03/2017 <0.006  0.000 - 0.026 ng/mL Final         Medications  Outpatient Encounter Prescriptions as of 4/13/2017   Medication Sig Dispense Refill    alprazolam (XANAX) 0.5 MG tablet TAKE ONE TABLET BY MOUTH TWICE DAILY 60 tablet 1    atorvastatin (LIPITOR) 80 MG tablet Take 1 tablet (80 mg total) by mouth once daily. (Patient taking differently: Take 80 mg by mouth every evening. ) 90 tablet 3    buPROPion (WELLBUTRIN XL) 150 MG TB24 tablet TAKE TWO TABLETS BY MOUTH ONCE DAILY 60 tablet 1    BUTALBITAL/ASPIRIN/CAFFEINE (FIORINAL ORAL) Take by mouth as needed.      docusate sodium (COLACE) 100 MG capsule Take 1 capsule (100 mg total) by mouth 3 (three) times daily as needed for Constipation. 90 capsule 3    doxycycline (MONODOX) 100 MG capsule       fluoxetine (PROZAC) 20 MG capsule TAKE TWO CAPSULES BY MOUTH ONCE DAILY 60 capsule 1    hydrocodone-acetaminophen 10-325mg (NORCO)  mg Tab Take 1 tablet by mouth every 6 (six) hours as needed. 61 tablet 0    levothyroxine (SYNTHROID) 100 MCG tablet Take 1 tablet (100 mcg total) by mouth before breakfast. 90 tablet 1    nitrofurantoin, macrocrystal-monohydrate, (MACROBID) 100 MG capsule Take 1 capsule (100 mg total) by mouth 2 (two) times daily. 14 capsule 0    ondansetron (ZOFRAN) 8 MG tablet Take 1 tablet (8  mg total) by mouth every 12 (twelve) hours as needed for Nausea. 60 tablet 3    pantoprazole (PROTONIX) 40 MG tablet Take 1 tablet (40 mg total) by mouth once daily. 90 tablet 3    polyethylene glycol (GLYCOLAX) 17 gram PwPk Take 17 g by mouth 2 (two) times daily. 30 packet 5    SENNOSIDES (SENNA LAX ORAL) Take by mouth as needed.      aspirin (ECOTRIN) 81 MG EC tablet Take 1 tablet (81 mg total) by mouth once daily.  0    [DISCONTINUED] lamotrigine (LAMICTAL) 25 MG tablet Take 1 tablet (25 mg total) by mouth once daily. 30 tablet 6     No facility-administered encounter medications on file as of 4/13/2017.            Assessment - Diagnosis - Goals:          Impression:61 y.o. female with a PMHx of chronic neck and back pain, with a dilaudid pain pump, s/p suprapubic catheter, GERD, hx CVA, possible MI, on ASA/Plavix, anxiety, RA, HLD, and hypothyroidism,  leg pain with      Opioid dependence in a controlled environment R/O opioid use disorder     H/O MDD.  Unspecified anxiety disorder       Strengths and Liabilities: Strength: Patient has reasonable judgment., Liability: Patient has poor health., Liability: Patient lacks coping skills.    Treatment Goals:  Specify outcomes written in observable, behavioral terms:   Anxiety: acquiring relapse prevention skills and reducing time spent worrying (<30 minutes/day)  Depression: eliminating all depressive symptoms (BDI score <10 for 1 month)    Treatment Plan/Recommendations:   · Medication Management: The risks and benefits of medication were discussed with the patient.  · Referral for further treatment to social work team for psychotherapy  · The treatment plan and follow up plan were reviewed with the patient.        I have discussed with the pt that given how sedated she appears today I am concern about her use of the medications.         I have dicussed with her that she will need to discuss this Dr Hillman.         She is already seeing Dr Oconnor and so will  continue to see him .  I have discussed with her safety concerns with opioids.  Am not able to discuss chronic pain rehabiliatation with her today because of her condition.   Not able to discuss coping skills.             She will F/U with Dr Oconnor.    Counseling time: 50%  Total time:60  minutes.   Consulting clinician was informed of the encounter and consult note.

## 2017-04-13 NOTE — TELEPHONE ENCOUNTER
Noted that pt spoke w/ TSadiq Payan's nurse, asking for new antibiotic Rx. Macrobid prescribed by Dr. Mayo on 4/7/2017 for positive urine culture.

## 2017-04-13 NOTE — TELEPHONE ENCOUNTER
----- Message from Karoline Land MA sent at 4/13/2017  8:58 AM CDT -----  Contact: [phone]  States that she has a very bad urine and kidney infection. Asking to speak with Elida

## 2017-04-13 NOTE — TELEPHONE ENCOUNTER
----- Message from Waldo Winters sent at 4/12/2017  4:43 PM CDT -----  Contact: Pt:[phone]  Pt called and states she would like to speak with  or her nurse in regards to some issues that she is having.

## 2017-04-13 NOTE — TELEPHONE ENCOUNTER
Pt stated she has a bad kidney infection, and bladder infection and request cipro or keflex for infection. Cath bath draining well with dark yellow urine.   Dr. Mayo ordered Macrobid bid for uti last Friday. Pt was instructed to finish the remaining pill of Abt.    However, Pt wanted to talk to YAHIR Batista for new ABT orders.  Sent msg to  to give pt call.

## 2017-04-13 NOTE — MR AVS SNAPSHOT
[facility]  [address]  [place] [address]  Phone: [phone]  Fax: [phone]                  Tasha Hawley   2017 12:00 PM   Office Visit    Description:  Female : 1955   Provider:  Harmony Borges MD   Department:  [facility]                To Do List           Future Appointments        Provider Department Dept Phone    2017 8:15 AM [facility] [id number] [address] LIMIT [facility] [phone]    2017 9:00 AM Robert Angeles MD [facility] [phone]    2017 10:00 AM Mesfin Hodges II, MD [facility] Internal Medicine [phone]      Goals (5 Years of Data)              4/6/17    3/31/17    3/30/17    Patient/caregiver will have adequate mental health support/resources prior to discharge from OPCM.   On track    On track    Notes - Note created  3/16/2017  9:27 AM by Robert Delgado III, LCSW    Overall Time to Completion  2 months from 2017    Short Term Goals  Patient/caregiver will verbalize understanding of Depression and ways to effectively/safely manage depression within 1 month.  Interventions   · Collaborate with care team member as appropriate to meet patient needs.  · Collaborate with OPCM RN as appropriate to meet patient needs.  · Collaborate with physician as appropriate to meet patient needs  · Encourage communication with providers.  · Encourage compliance with medical/mental health appointments.  · Encourage family/social  and involvement in care.  · Provide crisis intervention hotline.  · Provide mental/behavioral health resource(s).  · Provide supportive counseling.  · Recognize and provide educational material (JAI).   Status  · Partially met            Patient/caregiver will have an action plan in place to manage and prevent complications of CHF prior to discharge from OPCM.     Worsening      Notes - Note edited  3/10/2017 10:27 AM by Mel Cantrell RN     Overall Time to Completion  1 month from 03/10/2017    Short Term Goals  Patient/caregiver will verbalize 2 signs and symptoms of Congestive Heart Failure within 2 weeks.   Interventions   · Recognize and provide educational material (JAI).  · Assess for availability of working scale in home setting.  · Mail Weight log for home use.   Status  · Partially met     Clinical Reference Documents Added to Patient Instructions       Document    FALL PREVENTION (ENGLISH)    HEART FAILURE, WHAT IS (ENGLISH)    HEART FAILURE: TRACKING YOUR WEIGHT (ENGLISH)    HEART FAILURE: WARNING SIGNS OF A FLARE-UP (ENGLISH)              Patient/caregiver will have an action plan in place to manage and prevent complications of pain prior to discharge from OPCM.           Notes - Note edited  3/31/2017 12:54 PM by Mel Cantrell RN    Overall Time to Completion  1 month from 03/31/2017    Short Term Goals  Patient/caregiver will verbalize 2 noninvasive pain relief measures to help manage the pain within 2 weeks.  Interventions   · Recognize and provide educational material (JAI).   Status  · Partially met       Clinical Reference Documents Added to Patient Instructions       Document    BACK PAIN, RELIEVING (ENGLISH)    PAIN MANAGEMENT: CHRONIC (ENGLISH)    PAIN, THE CYCLE OF CHRONIC (ENGLISH)              COMPLETED: Patient/caregiver will have knowledge of resources available in order to obtain the services that are needed prior to discharge from OPCM.           Notes - Note edited  3/31/2017 12:50 PM by Mel Cantrell RN    Overall Time to Completion  1 month from 03/10/2017    Short Term Goals  Patient/caregiver will notify RN CCM if patient does not hear from OPCM  and PAP within 2 weeks.  Interventions   · Refer to [facility].  · Refer to Outpatient Case Management Social Worker.   Status  · Met          Patient/caregiver will have knowledge of resources available in order to obtain  the services that are needed prior to discharge from OPCM.       On track    Notes - Note created  3/16/2017  9:21 AM by Robert Delgado III, LCSW    Overall Time to Completion  2 months from 03/16/2017    Short Term Goals  Patient/caregiver will identify 3 supports or services to maintain or improve current functional status within 1 month.  Interventions   · Collaborate with care team member as appropriate to meet patient needs.  · Collaborate with external provider as appropriate to meet patient needs.   · Collaborate with OPCM RN as appropriate to meet patient needs.  · Collaborate with physician as appropriate to meet patient needs  · Complete community search for available resources on Agency:  agency or Home Care Services or volunteer program.  · Discuss patient care needs and potential barriers.  · Encourage communication with providers.  · Encourage compliance with medical/mental health appointments.  · Encourage family/social  and involvement in care.  · Provide family support resource(s).  · Provide information on alternative levels of care.  · Provide information on home care services.   Status  · Partially met            Patient/caregiver will have Safety Plan in place prior to discharge from OPCM.     On track      Notes - Note edited  3/10/2017 10:27 AM by Mel Cantrell RN    Overall Time to Completion  1 month from 03/10/2017    Short Term Goals  Patient/caregiver will verbalize importance of having a safe home environment by making sure rooms are well lit and removing throw rugs within 2 weeks.  Interventions   · Recognize and provide educational material (JAI).   Status  · Partially met       Clinical Reference Documents Added to Patient Instructions       Document    FALL PREVENTION (ENGLISH)    HEART FAILURE, WHAT IS (ENGLISH)    HEART FAILURE: TRACKING YOUR WEIGHT (ENGLISH)    HEART FAILURE: WARNING SIGNS OF A FLARE-UP (ENGLISH)                [facility]      [facility] Care [facility] - 24/7 Assistance  Unless otherwise directed by your provider, please contact [facility], our nurse care line that is available for 24/7 assistance.     Registered nurses in the [facility] provide: appointment scheduling, clinical advisement, health education, and other advisory services.  Call: [phone] (toll free)               Medications           Message regarding Medications     Verify the changes and/or additions to your medication regime listed below are the same as discussed with your clinician today.  If any of these changes or additions are incorrect, please notify your healthcare provider.             Verify that the below list of medications is an accurate representation of the medications you are currently taking.  If none reported, the list may be blank. If incorrect, please contact your healthcare provider. Carry this list with you in case of emergency.           Current Medications     alprazolam (XANAX) 0.5 MG tablet TAKE ONE TABLET BY MOUTH TWICE DAILY    atorvastatin (LIPITOR) 80 MG tablet Take 1 tablet (80 mg total) by mouth once daily.    buPROPion (WELLBUTRIN XL) 150 MG TB24 tablet TAKE TWO TABLETS BY MOUTH ONCE DAILY    BUTALBITAL/ASPIRIN/CAFFEINE (FIORINAL ORAL) Take by mouth as needed.    docusate sodium (COLACE) 100 MG capsule Take 1 capsule (100 mg total) by mouth 3 (three) times daily as needed for Constipation.    doxycycline (MONODOX) 100 MG capsule     fluoxetine (PROZAC) 20 MG capsule TAKE TWO CAPSULES BY MOUTH ONCE DAILY    hydrocodone-acetaminophen 10-325mg (NORCO)  mg Tab Take 1 tablet by mouth every 6 (six) hours as needed.    levothyroxine (SYNTHROID) 100 MCG tablet Take 1 tablet (100 mcg total) by mouth before breakfast.    nitrofurantoin, macrocrystal-monohydrate, (MACROBID) 100 MG capsule Take 1 capsule (100 mg total) by mouth 2 (two) times daily.    ondansetron (ZOFRAN) 8 MG tablet Take 1 tablet (8 mg total) by  "mouth every 12 (twelve) hours as needed for Nausea.    pantoprazole (PROTONIX) 40 MG tablet Take 1 tablet (40 mg total) by mouth once daily.    polyethylene glycol (GLYCOLAX) 17 gram PwPk Take 17 g by mouth 2 (two) times daily.    SENNOSIDES (SENNA LAX ORAL) Take by mouth as needed.    aspirin (ECOTRIN) 81 MG EC tablet Take 1 tablet (81 mg total) by mouth once daily.           Clinical Reference Information           Your Vitals Were     BP Pulse Temp Resp Height Weight    109/62 59 97.8 °F (36.6 °C) (Oral) 15 5' 4" (1.626 m) 81.6 kg (179 lb 14.3 oz)    Last Period BMI             (LMP Unknown) 30.88 kg/m2         Blood Pressure          Most Recent Value    BP  109/62      Allergies as of 4/13/2017     Imitrex [Sumatriptan Succinate]    Penicillins    Percocet [Oxycodone-acetaminophen]    Topamax [Topiramate]    Vancomycin    (D)-limonene Flavor    Bactrim [Sulfamethoxazole-trimethoprim]    Butorphanol Tartrate    Darvocet A500 [Propoxyphene N-acetaminophen]    Fentanyl    Phenytoin Sodium Extended    White Petrolatum-zinc    Zinc Oxide-white Petrolatum    Latex, Natural Rubber      Immunizations Administered on Date of Encounter - 4/13/2017     None      Language Assistance Services     ATTENTION: Language assistance services are available, free of charge. Please call [phone].      ATENCIÓN: Si habla español, tiene a morgan disposición servicios gratuitos de asistencia lingüística. Llame al [phone].     [facility] Ý: N?u b?n nói Ti?ng Vi?t, có các d?ch v? h? tr? ngôn ng? mi?n phí dành cho b?n. G?i s? [phone].         [facility] complies with applicable Federal civil rights laws and does not discriminate on the basis of race, color, national origin, age, disability, or sex.        "

## 2017-04-17 ENCOUNTER — OUTPATIENT CASE MANAGEMENT (OUTPATIENT)
Dept: ADMINISTRATIVE | Facility: OTHER | Age: 62
End: 2017-04-17

## 2017-04-17 NOTE — LETTER
April 17, 2017    Tasha Hawley  [address]  [place] [address]             [facility] [address] [place] [address] Dear ,    I work with [facility] Department. I have been unsuccessful at reaching you to follow-up to see how you have been doing. Please call me back at your earliest convenience to discuss your health care needs.      I can be reached at [phone] from 8:00AM to 4:30 PM on Monday thru Friday. [facility] also has a program where a nurse is available 24/7 to answer questions or provide medical advice, their number is [phone].    Thanks,      Mel MARINO RN  Outpatient Case Management

## 2017-04-17 NOTE — PROGRESS NOTES
4-17-17--- 2nd attempt to complete initial assessment for OPCM, left message requesting a return call. As this is my second unsuccessful attempt to complete initial assessment for OPCM, I will mail a letter with my contact information.Malcom MENDOZA [facility]      Follow-up Plan:  -Continue to educate about CHF. Review signs and symptoms of CHF flare up. Encourage patient to follow medication and treatment regimen. Encourage patient to maintain follow up with doctors.  -Continue to educate about fall prevention and safety in home setting. Encourage patient to follow medication and treatment regimen. Encourage patient to maintain follow up with doctors.  -Continue to educate about the bodies response to pain. Encourage patient to follow medication and treatment regimen. Encourage patient to maintain follow up with doctors.  _Follow-up with  RE: Patient reports Bilateral LE are double their normal size. And patient requesting for PT and OT to be re started with her home health.  --Collaborate with OPCM SW about available resources.  -Follow-up to see if Bone scan  and CT lumbar spine scheduled sent to [facility] notifying that test needed to be scheduled.    Malcom MENDOZA [facility]

## 2017-04-18 ENCOUNTER — TELEPHONE (OUTPATIENT)
Dept: UROLOGY | Facility: CLINIC | Age: 62
End: 2017-04-18

## 2017-04-18 NOTE — TELEPHONE ENCOUNTER
----- Message from Suyapa Batista sent at 4/18/2017  8:45 AM CDT -----  Contact: pt [phone]  Pt is asking to speak with the nurse. Pt is stating she is still having pressure in lower stomach for a little lamberto 2 weeks, no burning, just a lot of pressure. Please call pt.

## 2017-04-18 NOTE — TELEPHONE ENCOUNTER
----- Message from Shireen Mayo MD sent at 4/18/2017 12:59 PM CDT -----  Contact: pt [phone]  Please have home health go out and change the catheter and get a urine specimen from the new tube, they can irrigate it while they are there.    ----- Message -----     From: Christi Wang LPN     Sent: 4/18/2017  11:38 AM       To: Shireen Mayo MD    Pt states she is still with bladder infection. States she has lots of sediment in catheter bag, bladder pressure and pain radiating to her back. States she would like another antibiotic (Cipro or Keflex) and  an order for HH to come out an irrigate tube.  ----- Message -----     From: Suyapa Batista     Sent: 4/18/2017   8:45 AM       To: [facility] Staff    Pt is asking to speak with the nurse. Pt is stating she is still having pressure in lower stomach for a little lamberto 2 weeks, no burning, just a lot of pressure. Please call pt.

## 2017-04-18 NOTE — TELEPHONE ENCOUNTER
Spoke w/ [facility] at [phone], gave verbal order per Dr. Mayo.  states they will need to get pre-authorization from [facility] to go back out to pt's home since she was seen on 4/17/17 and does not have many authorization approvals left. May call back if anything else is needed.

## 2017-04-19 ENCOUNTER — TELEPHONE (OUTPATIENT)
Dept: UROLOGY | Facility: CLINIC | Age: 62
End: 2017-04-19

## 2017-04-19 ENCOUNTER — TELEPHONE (OUTPATIENT)
Dept: PAIN MEDICINE | Facility: CLINIC | Age: 62
End: 2017-04-19

## 2017-04-19 NOTE — TELEPHONE ENCOUNTER
Pt informed that verbal order was given to  on 4/18/17 to go out and collect specimen and irrigate pt's catheter.

## 2017-04-19 NOTE — TELEPHONE ENCOUNTER
Called pt today about scheduling follow up appointment an message sent to Dr. Borges, about getting something for pain approved.

## 2017-04-19 NOTE — TELEPHONE ENCOUNTER
----- Message from Clarita Castro sent at 4/19/2017  1:34 PM CDT -----  _  1st Request  _  2nd Request  _  3rd Request        Who: pt  Dangelo Hawley    Why: pt are calling because pt is in severe pain and would like to know what else she can do    What Number to Call Back:[phone] or [phone] call first    When to Expect a call back: (Before the end of the day)   -- if the call is after 12:00, the call back will be tomorrow.

## 2017-04-19 NOTE — TELEPHONE ENCOUNTER
----- Message from Kalina Cohn MA sent at 4/18/2017  4:40 PM CDT -----  Contact: self  [phone]  States she is calling regarding her urine and left abdomen pain behind her kidneys that is really bad.  States the medication is not helping her.

## 2017-04-20 ENCOUNTER — TELEPHONE (OUTPATIENT)
Dept: UROLOGY | Facility: CLINIC | Age: 62
End: 2017-04-20

## 2017-04-20 NOTE — TELEPHONE ENCOUNTER
----- Message from Karoline Land MA sent at 4/20/2017 11:42 AM CDT -----  Contact: [phone]  States that she is still waiting to hear back about getting antibiotics to treat her infection.

## 2017-04-20 NOTE — TELEPHONE ENCOUNTER
----- Message from Karoline Land MA sent at 4/20/2017 11:42 AM CDT -----  Contact: [phone]  States that she is still waiting to hear back about getting antibiotics to treat her infection.

## 2017-04-20 NOTE — TELEPHONE ENCOUNTER
----- Message from Shireen Mayo MD sent at 4/19/2017  8:11 PM CDT -----  Contact: self  [phone]  Please call her and let her know that having a discharge around the tube is normal.  She should clean around it with something like Dove soap and water and put a dry dressing around the tube.  She can cut a slit in it and the dressing should be changed daily.  The home health nurse can show her how to do it.    JT  ----- Message -----     From: Christi Wang LPN     Sent: 4/19/2017   3:03 PM       To: Shireen Mayo MD    Pt states she wants you to know that lately she has been having a greenish collection forming around sptube near skin that gets tangled in hairs. Wants to know what you think is happening?  ----- Message -----     From: Kalina Cohn MA     Sent: 4/18/2017   4:40 PM       To: [facility]yer Staff    States she is calling regarding her urine and left abdomen pain behind her kidneys that is really bad.  States the medication is not helping her.

## 2017-04-24 ENCOUNTER — OUTPATIENT CASE MANAGEMENT (OUTPATIENT)
Dept: ADMINISTRATIVE | Facility: OTHER | Age: 62
End: 2017-04-24

## 2017-04-24 ENCOUNTER — TELEPHONE (OUTPATIENT)
Dept: UROLOGY | Facility: CLINIC | Age: 62
End: 2017-04-24

## 2017-04-24 NOTE — PROGRESS NOTES
"4/24/17: LCSW contacted pt for follow up.  Pt self-reports calling [facility] and they said she would need to come to them to complete an initial assessment.  Pt stated she has also been receiving telephonic sessions with [facility] counselor.  Pt is interested in obtaining a "wilmar chair."  LCSW will send RN in-basket message and will follow up with pt in a couple of weeks.      4/6/17: LCSW contacted pt for follow up.  Pt self-reports being in a lot of pain today.  LCSW asked pt if she had a chance to contact [facility] yet; she stated she has not because she forgot where she wrote down the number.  LCSW gave pt the number to [facility] ([phone]) to schedule an assessment.  LCSW also gave pt the number to [facility] ([phone]) for 24/7 crisis counseling.  LCSW encouraged pt to follow up with [facility] and contact [facility] if needed.     3/30/17: LCSW contacted [facility] for information on their referral process; was told by representative that pt could call and they would complete an assessment over the phone with them.  LCSW contacted pt for follow up; pt stated she did not have a good doctor's visit yesterday; she would not elaborate.  LCSW gave pt the number to [facility] ([phone]) to complete an assessment.  LCSW will follow up with pt next week.     3/22/17: LCSW contacted pt for follow up.  Pt self-reports continuing to be in chronic pain due to multiple back surgeries and falls.  Pt inquired about receiving more PT and OT; explained to pt how an order would need to be made by her PCP and encouraged her to bring it up to him at her appointment tomorrow.  Pt also inquired about home care services; LCSW explained to pt how many home care agencies do not accept insurance(s), only long-term policies, and normally require a 5 hour minimum.  Pt does not believe she will be able to pay for home care services as her and her  are on " a fixed income.  [facility] will research home care agencies within the [facility].  Pt expressed an interest in receiving in-home behavioral health therapy.  [facility] will research behavioral health agencies who make therapeutic home visits within the [facility].       3/16/17: LCSW contacted pt to complete SW assessment.  Pt was currently seeing a doctor but  was able to complete SW assessment.  The pt is a 62 yo female with hx of major depressive disorder - single episode, anxiety; reason for referral is pt wants help in the home through a  program and has financial issues that impact health care and daily living.  Pt's  talked about how their medical expenses ($50 office visit co-pays, medications) has caused them financial difficulties.  Pt's  stated how they are currently in the process of applying to [facility] Financial Assistance program.  Pt's  self-reports his wife is receiving home health through [facility]; he also states how his wife is in the process of applying to [facility] for additional services.  Pt is currently receiving treatment for her depression from Psychiatrist (Dr. Oconnor).

## 2017-04-24 NOTE — TELEPHONE ENCOUNTER
Notified pt, per Dr. Mayo, that yeast was found in urine culture. The only thing needed to be done was for the catheter to be changed. Noted that catheter was changed on day of specimen collection from new catheter, okay per Dr. Mayo.

## 2017-04-25 ENCOUNTER — TELEPHONE (OUTPATIENT)
Dept: UROLOGY | Facility: CLINIC | Age: 62
End: 2017-04-25

## 2017-04-25 DIAGNOSIS — B37.49 CANDIDAL UTI (URINARY TRACT INFECTION): Primary | ICD-10-CM

## 2017-04-25 RX ORDER — FLUCONAZOLE 200 MG/1
200 TABLET ORAL DAILY
Qty: 7 TABLET | Refills: 0 | Status: SHIPPED | OUTPATIENT
Start: 2017-04-25 | End: 2017-05-02

## 2017-04-25 NOTE — TELEPHONE ENCOUNTER
----- Message from Christi Wang LPN sent at 4/25/2017  1:11 PM CDT -----  Contact: [phone]  Pt states she has a lot of sediment in catheter bad and is still having bladder pressure. Wants to know if swelling in upper legs can be a sign that kidneys are not functioning well. Would like to test for kidney function.  ----- Message -----     From: Karoline Land MA     Sent: 4/25/2017  10:47 AM       To: Maria Esther Iyer Staff    [phone]  States that she has a lot of bacteria in her urine and her legs are swollen. She is worried that are kidney's are backing up . I offered for her to come in to see an GERALD today,  she refused.

## 2017-04-25 NOTE — TELEPHONE ENCOUNTER
----- Message from Karoline Land MA sent at 4/25/2017 10:47 AM CDT -----  Contact: [phone]  States that she has a lot of bacteria in her urine and her legs are swollen. She is worried that are kidney's are backing up . I offered for her to come in to see an GERALD today,  she refused.

## 2017-04-26 ENCOUNTER — OFFICE VISIT (OUTPATIENT)
Dept: PSYCHIATRY | Facility: CLINIC | Age: 62
End: 2017-04-26
Payer: MEDICARE

## 2017-04-26 ENCOUNTER — OUTPATIENT CASE MANAGEMENT (OUTPATIENT)
Dept: ADMINISTRATIVE | Facility: OTHER | Age: 62
End: 2017-04-26

## 2017-04-26 VITALS
SYSTOLIC BLOOD PRESSURE: 135 MMHG | DIASTOLIC BLOOD PRESSURE: 60 MMHG | HEART RATE: 54 BPM | WEIGHT: 177 LBS | HEIGHT: 64 IN | BODY MASS INDEX: 30.22 KG/M2

## 2017-04-26 DIAGNOSIS — F33.0 MAJOR DEPRESSIVE DISORDER, RECURRENT EPISODE, MILD DEGREE: Primary | ICD-10-CM

## 2017-04-26 PROCEDURE — 99999 PR PBB SHADOW E&M-EST. PATIENT-LVL III: CPT | Mod: PBBFAC,,, | Performed by: PSYCHIATRY & NEUROLOGY

## 2017-04-26 PROCEDURE — 99499 UNLISTED E&M SERVICE: CPT | Mod: S$GLB,,, | Performed by: PSYCHIATRY & NEUROLOGY

## 2017-04-26 PROCEDURE — 1160F RVW MEDS BY RX/DR IN RCRD: CPT | Mod: S$GLB,,, | Performed by: PSYCHIATRY & NEUROLOGY

## 2017-04-26 PROCEDURE — 99214 OFFICE O/P EST MOD 30 MIN: CPT | Mod: S$GLB,,, | Performed by: PSYCHIATRY & NEUROLOGY

## 2017-04-26 RX ORDER — BUPROPION HYDROCHLORIDE 150 MG/1
300 TABLET ORAL DAILY
Qty: 60 TABLET | Refills: 2 | Status: SHIPPED | OUTPATIENT
Start: 2017-04-26 | End: 2017-05-18

## 2017-04-26 RX ORDER — FLUOXETINE HYDROCHLORIDE 20 MG/1
40 CAPSULE ORAL DAILY
Qty: 60 CAPSULE | Refills: 2 | Status: SHIPPED | OUTPATIENT
Start: 2017-04-26 | End: 2017-07-12 | Stop reason: SDUPTHER

## 2017-04-26 RX ORDER — TRAZODONE HYDROCHLORIDE 50 MG/1
50 TABLET ORAL NIGHTLY
Qty: 30 TABLET | Refills: 2 | Status: SHIPPED | OUTPATIENT
Start: 2017-04-26 | End: 2017-05-04 | Stop reason: SDUPTHER

## 2017-04-26 NOTE — PROGRESS NOTES
"4-26-17--Follow-up for OPC completed. Interview conducted with patient. Patient reports that she is okay.  Patient reports that she uses a walker to aid with ambulation. Patient reports having recent trips. We went over information about fall prevention and safety in home setting.  Patient reports that she is working with  on getting a "Veronika Chair". Patient reports that order was sent to DME agency on yesterday. We went over information about the bodies response to pain.  We went over importance of adherence to medication regimen. We went over importance of maintained follow-up with doctors. I will mail educational literature about the bodies response to pain to patient/family, will review literature with patient/family at next phone call. Patient reports that she is active with [facility]. Patient is requesting for PT and OT be restarted with HH. Patient reports that she is working with [facility]. Patient reports that she weighs herself every day to check for fluid retention. Patient reports that she is unsure of her weight on today. Patient reports that she wears Unna boots to assist with swelling in her bilateral lower extremities. Patient reports that her bilateral LE are swollen to double their normal size. Patient reports that she feels that she is holding on to fluid.  We went over information about CHF. We went over signs and symptoms of CHF flare up. We went over importance of daily monitoring and logging of weight. We went over importance of adherence to medication regimen. We went over importance of maintained follow-up with doctors.I will send a message to  to alert of bilateral LE swelling and that patient is requesting HH PT and OT. I will also reach out to [employer] to alert that patient had recent falls and requesting HH for PT and OT and ask HH to also try to work on getting orders for HH PT AND OT. Left message with Zara reports that she was " instructed she has yeast in her urine. She was unaware that Diflucan was called in to her pharmacy, she will have family member go  medicine on today and begin to take medicine.     Follow-up Plan:  -Continue to educate about CHF. Review signs and symptoms of CHF flare up. Encourage patient to follow medication and treatment regimen. Encourage patient to maintain follow up with doctors.  -Continue to educate about fall prevention and safety in home setting. Encourage patient to follow medication and treatment regimen. Encourage patient to maintain follow up with doctors.  -Continue to educate about the bodies response to pain. Encourage patient to follow medication and treatment regimen. Encourage patient to maintain follow up with doctors.  _Follow-up with  RE: Patient reports Bilateral LE are double their normal size. And patient requesting for PT and OT to be re started with her home health  -Follow up with [facility] to see if they were able to get orders for HH PT AND OT.  --Collaborate with [facility] SW about available resources.      Malcom MENDOZA CCM

## 2017-04-26 NOTE — PROGRESS NOTES
Outpatient Psychiatry Follow-Up Visit (MD/NP)    4/26/2017    Clinical Status of Patient:  Outpatient (Ambulatory)    Chief Complaint:  Tasha Hawley is a 61 y.o. female who presents today for follow-up of depression and anxiety.  Met with patient.      Interval History and Content of Current Session:  Interim Events/Subjective Report/Content of Current Session: She continues to struggle with multiple medical problems, especially chronic pain and now has suprapubic catheter.   Pt was hospitalized and her medical regimen has been simplified.  Pt has reduced her Xanax 0.25 1/4 tab at bedtime.  She understans the hazards of Xanax and her pain medication and fall risk,  She agreed to stop the Xanax.  She will continue to take trazodone for sleep and other meds.  She has lost about 100 pounds and struggles to navigate even with a walker.  She seems perfectly alert today    Psychotherapy:  · Target symptoms: depression, anxiety   · Why chosen therapy is appropriate versus another modality: relevant to diagnosis  · Outcome monitoring methods: self-report, observation  · Therapeutic intervention type: supportive psychotherapy  · Topics discussed/themes: stress related to medical comorbidities, building skills sets for symptom management, symptom recognition  · The patient's response to the intervention is accepting. The patient's progress toward treatment goals is fair.   · Duration of intervention: 10 minutes.    Review of Systems   · PSYCHIATRIC: Pertinant items are noted in the narrative.    Past Medical, Family and Social History: The patient's past medical, family and social history have been reviewed and updated as appropriate within the electronic medical record - see encounter notes.    Compliance: yes    Side effects: None    Risk Parameters:  Patient reports no suicidal ideation  Patient reports no homicidal ideation  Patient reports no self-injurious behavior  Patient reports no violent behavior    Exam  "(detailed: at least 9 elements; comprehensive: all 15 elements)   Constitutional  Vitals:  Most recent vital signs, dated less than 90 days prior to this appointment, were reviewed.   Vitals:    04/26/17 1152   BP: 135/60   Pulse: (!) 54   Weight: 80.3 kg (177 lb)   Height: 5' 4" (1.626 m)        General:  older than stated age, weight loss is obvious     Musculoskeletal  Muscle Strength/Tone:  no tremor   Gait & Station:  uses walker     Psychiatric  Speech:  no latency; no press   Mood & Affect:  anxious  congruent and appropriate   Thought Process:  normal and logical   Associations:  intact   Thought Content:  normal, no suicidality, no homicidality, delusions, or paranoia   Insight:  intact   Judgement: behavior is adequate to circumstances   Orientation:  grossly intact   Memory: intact for content of interview   Language: grossly intact   Attention Span & Concentration:  able to focus   Fund of Knowledge:  intact and appropriate to age and level of education     Assessment and Diagnosis   Status/Progress: Based on the examination today, the patient's problem(s) is/are well controlled.  New problems have not been presented today.   Co-morbidities are complicating management of the primary condition.  There are no active rule-out diagnoses for this patient at this time.     General Impression: Depression    No diagnosis found.    Intervention/Counseling/Treatment Plan   · Medication Management: Continue current medications. The risks and benefits of medication were discussed with the patient. except stop Xanax and continue trazodone for sleep and deprtession      Return to Clinic: 3 months  "

## 2017-04-27 ENCOUNTER — TELEPHONE (OUTPATIENT)
Dept: INTERNAL MEDICINE | Facility: CLINIC | Age: 62
End: 2017-04-27

## 2017-04-27 NOTE — TELEPHONE ENCOUNTER
Spoke to patient she states she is having a lot more swelling than normal and wants to do lab work. Also having hot flashed and she miserable she states. Offered patient an appointment since Dr. Hodges is currently in the office she said she will call back later today so I can get her in with someone tomorrow.

## 2017-05-01 ENCOUNTER — TELEPHONE (OUTPATIENT)
Dept: UROLOGY | Facility: CLINIC | Age: 62
End: 2017-05-01

## 2017-05-01 RX ORDER — HYOSCYAMINE SULFATE 0.125 MG
TABLET ORAL
Qty: 120 TABLET | Refills: 0 | Status: SHIPPED | OUTPATIENT
Start: 2017-05-01 | End: 2017-08-21

## 2017-05-01 NOTE — TELEPHONE ENCOUNTER
----- Message from Kalina Cohn MA sent at 5/1/2017  9:07 AM CDT -----  Contact: self  [phone]  States she is with pressure and yeast in the tube and wants a muscle relaxer or something to help her.

## 2017-05-02 ENCOUNTER — HOSPITAL ENCOUNTER (OUTPATIENT)
Dept: RADIOLOGY | Facility: HOSPITAL | Age: 62
Discharge: HOME | End: 2017-05-02
Attending: NEUROLOGICAL SURGERY
Payer: MEDICARE

## 2017-05-02 ENCOUNTER — OUTPATIENT CASE MANAGEMENT (OUTPATIENT)
Dept: ADMINISTRATIVE | Facility: OTHER | Age: 62
End: 2017-05-02

## 2017-05-02 ENCOUNTER — OFFICE VISIT (OUTPATIENT)
Dept: NEUROSURGERY | Facility: CLINIC | Age: 62
End: 2017-05-02
Payer: MEDICARE

## 2017-05-02 VITALS
SYSTOLIC BLOOD PRESSURE: 121 MMHG | BODY MASS INDEX: 29.19 KG/M2 | DIASTOLIC BLOOD PRESSURE: 61 MMHG | TEMPERATURE: 98 F | WEIGHT: 171 LBS | HEART RATE: 47 BPM | HEIGHT: 64 IN

## 2017-05-02 DIAGNOSIS — N31.9 NEUROGENIC BLADDER: ICD-10-CM

## 2017-05-02 DIAGNOSIS — M41.50 DEGENERATIVE SCOLIOSIS IN ADULT PATIENT: Primary | ICD-10-CM

## 2017-05-02 DIAGNOSIS — Z98.890 S/P INSERTION OF INTRATHECAL PUMP: ICD-10-CM

## 2017-05-02 DIAGNOSIS — R29.6 FREQUENT FALLS: ICD-10-CM

## 2017-05-02 DIAGNOSIS — M51.36 DEGENERATIVE DISC DISEASE, LUMBAR: ICD-10-CM

## 2017-05-02 DIAGNOSIS — G89.4 CHRONIC PAIN SYNDROME: ICD-10-CM

## 2017-05-02 DIAGNOSIS — M47.816 FACET ARTHROPATHY, LUMBAR: ICD-10-CM

## 2017-05-02 DIAGNOSIS — Z96.89 S/P INSERTION OF SPINAL CORD STIMULATOR: ICD-10-CM

## 2017-05-02 DIAGNOSIS — M47.26 OSTEOARTHRITIS OF SPINE WITH RADICULOPATHY, LUMBAR REGION: ICD-10-CM

## 2017-05-02 DIAGNOSIS — M81.0 OSTEOPOROSIS WITHOUT PATHOLOGICAL FRACTURE: ICD-10-CM

## 2017-05-02 DIAGNOSIS — I89.0 LYMPHEDEMA OF BOTH LOWER EXTREMITIES: ICD-10-CM

## 2017-05-02 DIAGNOSIS — R20.2 PARESTHESIA OF BOTH LOWER EXTREMITIES: ICD-10-CM

## 2017-05-02 DIAGNOSIS — M54.16 LUMBAR RADICULOPATHY: ICD-10-CM

## 2017-05-02 DIAGNOSIS — R29.898 WEAKNESS OF BOTH LOWER EXTREMITIES: ICD-10-CM

## 2017-05-02 DIAGNOSIS — M41.9 SCOLIOSIS OF LUMBAR SPINE, UNSPECIFIED SCOLIOSIS TYPE: ICD-10-CM

## 2017-05-02 PROCEDURE — 99499 UNLISTED E&M SERVICE: CPT | Mod: S$GLB,,, | Performed by: NEUROLOGICAL SURGERY

## 2017-05-02 PROCEDURE — 72082 X-RAY EXAM ENTIRE SPI 2/3 VW: CPT | Mod: 26,,, | Performed by: RADIOLOGY

## 2017-05-02 PROCEDURE — 99999 PR PBB SHADOW E&M-EST. PATIENT-LVL IV: CPT | Mod: PBBFAC,,, | Performed by: NEUROLOGICAL SURGERY

## 2017-05-02 PROCEDURE — 99214 OFFICE O/P EST MOD 30 MIN: CPT | Mod: S$GLB,,, | Performed by: NEUROLOGICAL SURGERY

## 2017-05-02 PROCEDURE — 1160F RVW MEDS BY RX/DR IN RCRD: CPT | Mod: S$GLB,,, | Performed by: NEUROLOGICAL SURGERY

## 2017-05-02 RX ORDER — LEVOTHYROXINE SODIUM 125 UG/1
TABLET ORAL
COMMUNITY
Start: 2017-04-18 | End: 2017-05-04

## 2017-05-02 RX ORDER — DICLOFENAC SODIUM 10 MG/G
GEL TOPICAL
COMMUNITY
Start: 2017-04-03 | End: 2017-08-21

## 2017-05-02 RX ORDER — LIDOCAINE AND PRILOCAINE 25; 25 MG/G; MG/G
CREAM TOPICAL
COMMUNITY
Start: 2017-04-03 | End: 2017-08-21

## 2017-05-02 RX ORDER — OXYBUTYNIN CHLORIDE 15 MG/1
TABLET, EXTENDED RELEASE ORAL
COMMUNITY
Start: 2017-05-01 | End: 2017-06-12 | Stop reason: SDUPTHER

## 2017-05-02 NOTE — PROGRESS NOTES
Dear Nettie Matta PA-C,     Thank you for referring this patient to my clinic. The full details of my evaluation will also be forthcoming to you by letter.    CHIEF COMPLAINT:  Consult    I, Tanvi Lowe, am scribing for, and in the presence of, Dr. Angeles.    HPI:  Tasha Hawley is a 61 y.o.  female with a history of stroke, MI, on aspirin and Plavix, RA, Dilaudid pain pump, suprapubic catheter, multiple lumbar fusions, spinal cord stimulator leads, and a previous ACD performed by Dr. Martinez in 2013, who is referred to me by Nettie Matta PA-C, for evaluation of back pain. Pt reports the last lumbar surgery was performed over ten years ago by Dr. Kiran. Pt is somewhat able to  ambulate; however, she falls often. Pt notes that she falls multiple times daily. Pt reports constant low back pain radiating down the LLE. Pt notes urinary incontinence resulting in catheter placement 6-7 months ago. Pt reports pain in her ribs. Pt has tried wearing a hard brace in the past; however she stopped wearing it. Pt's pain is managed with a dilaudid pump Per pt's , her low back pain. Pt presents today in a wheelchair.       Review of patient's allergies indicates:   Allergen Reactions    Imitrex [sumatriptan succinate] Shortness Of Breath    Penicillins Shortness Of Breath     Other reaction(s): Jittery    Percocet [oxycodone-acetaminophen] Anaphylaxis    Topamax [topiramate] Shortness Of Breath    Vancomycin Shortness Of Breath     Rash    (d)-limonene flavor      Other reaction(s): difficult intubation  Other reaction(s): Jittery  Other reaction(s): Difficulty breathing  Other reaction(s): Difficulty breathing  Other reaction(s): Jittery  Other reaction(s): Difficulty breathing    Bactrim [sulfamethoxazole-trimethoprim] Nausea And Vomiting     Other reaction(s): Resp Depression  Other reaction(s): Anaphylaxis    Butorphanol tartrate     Darvocet a500 [propoxyphene n-acetaminophen]      Other  reaction(s): Jittery  Other reaction(s): Difficulty breathing    Fentanyl      Other reaction(s): Vomiting  Other reaction(s): Nausea  Other reaction(s): Itching  swelling    Phenytoin sodium extended      pATIENT DENIES EVER HAVING THIS MEDICATION    White petrolatum-zinc     Zinc oxide-white petrolatum      Other reaction(s): Difficulty breathing    Latex, natural rubber Itching and Rash       Past Medical History:   Diagnosis Date    Allergy     Anxiety     Arthritis     Carotid artery occlusion     Cataract     Depression     Edema     chronic    Fever blister     Hypothyroid     Iron deficiency anemia     Joint pain     Lumbar radiculopathy     with chronic pain    Ocular migraine     Sleep apnea     cpap     Past Surgical History:   Procedure Laterality Date    BACK SURGERY      x 12    CATARACT EXTRACTION W/  INTRAOCULAR LENS IMPLANT Left     Dr Coleman     HYSTERECTOMY  1975    endometriosis    pain pump placement      SPINE SURGERY  5-13-13    CERVICAL FUSION     Family History   Problem Relation Age of Onset    Cancer Mother 55     breast    Cancer Father      esophagus,had laryngectomy    Parkinsonism Maternal Grandmother     Tremor Maternal Grandmother     No Known Problems Brother     No Known Problems Brother     Heart disease Maternal Uncle     No Known Problems Sister     No Known Problems Maternal Aunt     No Known Problems Paternal Aunt     No Known Problems Paternal Uncle     No Known Problems Maternal Grandfather     No Known Problems Paternal Grandmother     No Known Problems Paternal Grandfather     Melanoma Neg Hx     Amblyopia Neg Hx     Blindness Neg Hx     Cataracts Neg Hx     Diabetes Neg Hx     Glaucoma Neg Hx     Hypertension Neg Hx     Macular degeneration Neg Hx     Retinal detachment Neg Hx     Strabismus Neg Hx     Stroke Neg Hx     Thyroid disease Neg Hx      Social History   Substance Use Topics    Smoking status: Never Smoker     Smokeless tobacco: Never Used    Alcohol use No      Comment: denies        Review of Systems   Constitutional: Negative.    HENT: Negative.    Eyes: Negative.    Respiratory: Negative.    Cardiovascular: Negative.    Gastrointestinal: Negative.    Endocrine: Negative.    Genitourinary: Negative.    Musculoskeletal: Positive for back pain (low back pain radiating to LLE), gait problem (wheelchair) and myalgias (LLE). Negative for neck pain.   Skin: Negative.    Allergic/Immunologic: Negative.    Neurological: Negative for weakness, light-headedness, numbness and headaches.        Imbalance     Hematological: Negative.    Psychiatric/Behavioral: Negative.        OBJECTIVE:   Vital Signs:  Temp: 98.1 °F (36.7 °C) (05/02/17 0851)  Pulse: (!) 47 (05/02/17 0851)  BP: 121/61 (05/02/17 0851)    Physical Exam:    Vital signs: All nursing notes and vital signs reviewed -- afebrile, vital signs stable.  Constitutional: Patient sitting comfortably in chair. Appears well developed and well nourished.  Skin: Exposed areas are intact without abnormal markings, rashes or other lesions.  HEENT: Normocephalic. Normal conjunctivae. Decreasead hearing in left ear, deaf in right ear.  Cardiovascular: Normal rate and regular rhythm.  Respiratory: Chest wall rises and falls symmetrically, without signs of respiratory distress.  Abdomen: Soft and non-tender.  Extremities: Edema in bilateral lower extremities.   Psych/Behavior: Normal affect.    Chronic facial asymmetry    Neurological:    Mental status: Alert and oriented. Conversational and appropriate.       Cranial Nerves: Grossly intact.     Motor:    Upper:  Deltoids Triceps Biceps WE WF     R 4-/5 4-/5 4-/5 4-/5 4-/5 4-/5    L 4-/5 4-/5 4-/5 4-/5 4-/5 4-/5      Lower:  HF KE KF DF PF EHL    R 3/5 3/5 3/5 3/5 3/5 3/5    L 3/5 3/5 3/5 3/5 3/5 3/5     Sensory: Intact sensation to light touch in all extremities. Romberg negative.      Reflexes:          DTR: 2+ symmetrically  throughout.     Underwood's: Negative.     Babinski's: Negative.     Clonus: Negative.    Cerebellar: Finger-to-nose and rapid alternating movements normal. Gait stable, fluid.    Spine:    Posture: Leaning left in wheelchair, uncomfortably. Head well aligned over pelvis in front and side views.  No focal or global spinal deformity visible on inspection. Shoulders and hips even. No obvious leg length discrepancy. No scapula winging.    Bending: Full ROM with forward, back and lateral bending. No rib prominence with forward bend.    Cervical:      ROM: Full with flexion, extension, lateral rotation and ear-to-shoulder bend.      Midline TTP: Negative.     Spurling's test: Negative.     Lhermitte's: Negative.    Thoracic:     Midline TTP: Negative    Lumbar:     Midline TTP: Negative     Straight Leg Test: Negative     Crossed Straight Leg Test: Negative     Sciatic notch tenderness: Negative.     TTP lumbar coronal curve: Positive.     Other:     SI joint TTP: Negative.     Greater trochanter TTP: Negative.     Tenderness with external/internal hip rotation: Negative.    Diagnostic Results:  All imaging was independently reviewed by me.    NM Bone Scan, dated 4/10/2017:  1. Increased tracer uptake at left  L1/2 and L2/3    CT L-spine, dated 4/10/2017:  1. Anterior fusion and intervertebral spacers at L3/4 and L4/5   2. Evidence of fusion from L3 to sacrum   3. Distal screw fragmet at right L5  4. L3/L4 laminectomy  5. Pain pump tubing enters at T11/12  6. Has 6 lumbar vertabrae    Scoliosis (sitting) AP and Lateral X-ray, dated 5/2/2017:  1. C4 to C7 ACD  2. Lumbosacral pedicle screw fixation  3. Pain pump over the right abdomen  4. Freestanding spinal cord stimulator leads without generator  5. Global coronal balance - 7 cm  6. Right coronal lumbar curve apex at right L2/3 measuring 53 degrees  7. Positive sagittal balance 13.5 cm  8. Near contact of left lower rib cage to lef iliac crest  9. Significant pelvic  obliquity   10. Left hemipelvis higher than right and right shoulder higher than left   11. Measurements:    Pelvic Incidence: 56 degrees   Lumbar lordosis: 11 degrees    ASSESSMENT/PLAN:     Tasha Hawley is a 61F with a complex medical history including chronic pain with opioid dependence and multiple spinal fusion procedures, who presents with worsening low back pain. She has symptomatic adult spinal deformity of the lumbar spine marked by a significant coronal and sagittal deformity proximal to her lumbar fusion which is leading to global imbalance in the sagittal and coronal planes. Her radiological measurements and clinical presentation would justify surgery, but her medical problems are currently prohibitive with surgical risks greater than 90%. I recommend custom TLSO bracing, a SCS trial, and a DXA study for osteoporosis. Considerations for SCS trial include indwelling pain pump tubing and orphaned old SCS leads (placed over 10 years ago). I will re-evaluate her in 1 year with a new scoliosis x-ray evaluating for curve progression.    The patient understands and agrees with the plan of care. All questions were answered.     1. Referral for TLSO brace fitting  2. Referral to pain management   3. DXA Bone Scan  4. RTC 1 year with Scoliosis X-ray    I, Dr. Angeles, personally performed the services described in this documentation as scribed by Tanvi Lowe in my presence, and it is both accurate and complete.        Robert Angeles M.D.  Department of Neurosurgery  [facility]      .

## 2017-05-02 NOTE — LETTER
May 2, 2017      ALEXY Taylor  [address]  [place] [address]           [facility]  [address]  [facility] [address]  Phone: [phone]          Patient: Tasha Hawley   MR Number: [id number]   YOB: 1955   Date of Visit: 5/2/2017       Dear Nettie Matta:    Thank you for referring Tasha Hawley to me for evaluation. Attached you will find relevant portions of my assessment and plan of care.    If you have questions, please do not hesitate to call me. I look forward to following Tasha Hawley along with you.    Sincerely,    Robert Angeles MD    Enclosure  CC:  No Recipients    If you would like to receive this communication electronically, please contact [email or web address] or [phone] to request more information on [employer] Link access.    For providers and/or their staff who would like to refer a patient to [facility], please contact us through our one-stop-shop provider referral line, [facility] , at [phone].    If you feel you have received this communication in error or would no longer like to receive these types of communications, please e-mail [email or web address]

## 2017-05-04 ENCOUNTER — OFFICE VISIT (OUTPATIENT)
Dept: INTERNAL MEDICINE | Facility: CLINIC | Age: 62
End: 2017-05-04
Payer: MEDICARE

## 2017-05-04 ENCOUNTER — LAB VISIT (OUTPATIENT)
Dept: LAB | Facility: HOSPITAL | Age: 62
End: 2017-05-04
Attending: INTERNAL MEDICINE
Payer: MEDICARE

## 2017-05-04 ENCOUNTER — OUTPATIENT CASE MANAGEMENT (OUTPATIENT)
Dept: ADMINISTRATIVE | Facility: OTHER | Age: 62
End: 2017-05-04

## 2017-05-04 VITALS
HEART RATE: 48 BPM | SYSTOLIC BLOOD PRESSURE: 112 MMHG | DIASTOLIC BLOOD PRESSURE: 66 MMHG | TEMPERATURE: 98 F | HEIGHT: 64 IN | WEIGHT: 171.06 LBS | BODY MASS INDEX: 29.2 KG/M2

## 2017-05-04 DIAGNOSIS — E03.9 PRIMARY HYPOTHYROIDISM: ICD-10-CM

## 2017-05-04 DIAGNOSIS — R33.9 URINARY RETENTION: ICD-10-CM

## 2017-05-04 DIAGNOSIS — G47.00 INSOMNIA, UNSPECIFIED TYPE: Primary | ICD-10-CM

## 2017-05-04 DIAGNOSIS — F41.9 ANXIETY: ICD-10-CM

## 2017-05-04 DIAGNOSIS — R29.6 FREQUENT FALLS: ICD-10-CM

## 2017-05-04 DIAGNOSIS — G89.4 CHRONIC PAIN SYNDROME: ICD-10-CM

## 2017-05-04 DIAGNOSIS — F11.20 NARCOTIC DEPENDENCY, CONTINUOUS: ICD-10-CM

## 2017-05-04 LAB
T4 FREE SERPL-MCNC: 0.95 NG/DL
TSH SERPL DL<=0.005 MIU/L-ACNC: 16.49 UIU/ML

## 2017-05-04 PROCEDURE — 1160F RVW MEDS BY RX/DR IN RCRD: CPT | Mod: S$GLB,,, | Performed by: INTERNAL MEDICINE

## 2017-05-04 PROCEDURE — 84439 ASSAY OF FREE THYROXINE: CPT

## 2017-05-04 PROCEDURE — 99999 PR PBB SHADOW E&M-EST. PATIENT-LVL III: CPT | Mod: PBBFAC,,, | Performed by: INTERNAL MEDICINE

## 2017-05-04 PROCEDURE — 99214 OFFICE O/P EST MOD 30 MIN: CPT | Mod: S$GLB,,, | Performed by: INTERNAL MEDICINE

## 2017-05-04 PROCEDURE — 99499 UNLISTED E&M SERVICE: CPT | Mod: S$GLB,,, | Performed by: INTERNAL MEDICINE

## 2017-05-04 PROCEDURE — 36415 COLL VENOUS BLD VENIPUNCTURE: CPT

## 2017-05-04 PROCEDURE — 84443 ASSAY THYROID STIM HORMONE: CPT

## 2017-05-04 RX ORDER — TRAZODONE HYDROCHLORIDE 50 MG/1
150 TABLET ORAL NIGHTLY
Qty: 90 TABLET | Refills: 3
Start: 2017-05-04 | End: 2017-07-12 | Stop reason: SDUPTHER

## 2017-05-04 NOTE — MR AVS SNAPSHOT
[facility]  [address]  [facility] [address]  Phone: [phone]  Fax: [phone]                  Tasha Hawley   2017 10:00 AM   Office Visit    Description:  Female : 1955   Provider:  Mesfin Hodges II, MD   Department:  [facility]           Reason for Visit     Chronic Pain           Diagnoses this Visit        Comments    Insomnia, unspecified type    -  Primary     Anxiety         Chronic pain syndrome         Frequent falls         Narcotic dependency, continuous         Urinary retention         Primary hypothyroidism                To Do List           Future Appointments        Provider Department Dept Phone    2017 2:00 PM Elida Batista NP [facility] [place] [phone]    2017 11:00 AM [facility], DEXA1 [facility] [phone]    5/10/2017 1:00 PM Taurus Govea MD [facility] - Pain Management [phone]    2017 11:40 AM Harmony Borges MD [facility] - Pain Management [phone]      Goals (5 Years of Data)              17    Patient/caregiver will have adequate mental health support/resources prior to discharge from [facility].     On track  On track    Notes - Note created  3/16/2017  9:27 AM by Robert Delgado III, LCSW    Overall Time to Completion  2 months from 2017    Short Term Goals  Patient/caregiver will verbalize understanding of Depression and ways to effectively/safely manage depression within 1 month.  Interventions   · Collaborate with care team member as appropriate to meet patient needs.  · Collaborate with OPCM RN as appropriate to meet patient needs.  · Collaborate with physician as appropriate to meet patient needs  · Encourage communication with providers.  · Encourage compliance with medical/mental health appointments.  · Encourage family/social  and involvement in care.  · Provide crisis intervention hotline.  · Provide  mental/behavioral health resource(s).  · Provide supportive counseling.  · Recognize and provide educational material (JAI).   Status  · Partially met            Patient/caregiver will have an action plan in place to manage and prevent complications of CHF prior to discharge from O[facility].   No change        Notes - Note edited  3/10/2017 10:27 AM by Mel Cantrell RN    Overall Time to Completion  1 month from 03/10/2017    Short Term Goals  Patient/caregiver will verbalize 2 signs and symptoms of Congestive Heart Failure within 2 weeks.   Interventions   · Recognize and provide educational material (JAI).  · Assess for availability of working scale in home setting.  · Mail Weight log for home use.   Status  · Partially met     Clinical Reference Documents Added to Patient Instructions       Document    FALL PREVENTION (ENGLISH)    HEART FAILURE, WHAT IS (ENGLISH)    HEART FAILURE: TRACKING YOUR WEIGHT (ENGLISH)    HEART FAILURE: WARNING SIGNS OF A FLARE-UP (ENGLISH)              Patient/caregiver will have an action plan in place to manage and prevent complications of pain prior to discharge from OPCM.   On track        Notes - Note edited  3/31/2017 12:54 PM by Mel Cantrell RN    Overall Time to Completion  1 month from 03/31/2017    Short Term Goals  Patient/caregiver will verbalize 2 noninvasive pain relief measures to help manage the pain within 2 weeks.  Interventions   · Recognize and provide educational material (JAI).   Status  · Partially met       Clinical Reference Documents Added to Patient Instructions       Document    BACK PAIN, RELIEVING (ENGLISH)    PAIN MANAGEMENT: CHRONIC (ENGLISH)    PAIN, THE CYCLE OF CHRONIC (ENGLISH)              COMPLETED: Patient/caregiver will have knowledge of resources available in order to obtain the services that are needed prior to discharge from OPCM.           Notes - Note edited  3/31/2017 12:50 PM by Mel Cantrell RN    Overall Time to Completion  1  month from 03/10/2017    Short Term Goals  Patient/caregiver will notify RN CCM if patient does not hear from OPCM  and PAP within 2 weeks.  Interventions   · Refer to [facility].  · Refer to Outpatient Case Management Social Worker.   Status  · Met          Patient/caregiver will have knowledge of resources available in order to obtain the services that are needed prior to discharge from OPCM.     On track      Notes - Note created  3/16/2017  9:21 AM by Robert Delgado III, LCSW    Overall Time to Completion  2 months from 03/16/2017    Short Term Goals  Patient/caregiver will identify 3 supports or services to maintain or improve current functional status within 1 month.  Interventions   · Collaborate with care team member as appropriate to meet patient needs.  · Collaborate with external provider as appropriate to meet patient needs.   · Collaborate with OPCM RN as appropriate to meet patient needs.  · Collaborate with physician as appropriate to meet patient needs  · Complete community search for available resources on Agency:  agency or Home Care Services or volunteer program.  · Discuss patient care needs and potential barriers.  · Encourage communication with providers.  · Encourage compliance with medical/mental health appointments.  · Encourage family/social  and involvement in care.  · Provide family support resource(s).  · Provide information on alternative levels of care.  · Provide information on home care services.   Status  · Partially met            Patient/caregiver will have Safety Plan in place prior to discharge from OPCM.   Worsening        Notes - Note edited  3/10/2017 10:27 AM by Mel Cantrell RN    Overall Time to Completion  1 month from 03/10/2017    Short Term Goals  Patient/caregiver will verbalize importance of having a safe home environment by making sure rooms are well lit and removing throw rugs within 2  weeks.  Interventions   · Recognize and provide educational material (JAI).   Status  · Partially met       Clinical Reference Documents Added to Patient Instructions       Document    FALL PREVENTION (ENGLISH)    HEART FAILURE, WHAT IS (ENGLISH)    HEART FAILURE: TRACKING YOUR WEIGHT (ENGLISH)    HEART FAILURE: WARNING SIGNS OF A FLARE-UP (ENGLISH)                Follow-Up and Disposition     Return in about 6 weeks (around 6/15/2017).       These Medications        Disp Refills Start End    trazodone (DESYREL) 50 MG tablet 90 tablet 3 5/4/2017 5/4/2018    Take 3 tablets (150 mg total) by mouth every evening. - Oral    Pharmacy: [id number] - [facility]note 3[facility] [place] - [address]  #: [phone]         [facility] On Call     [facility] On Call Nurse Care Line - 24/7 Assistance  Unless otherwise directed by your provider, please contact [facility], our nurse care line that is available for 24/7 assistance.     Registered nurses in the [facility] On Call Center provide: appointment scheduling, clinical advisement, health education, and other advisory services.  Call: [phone] (toll free)               Medications           Message regarding Medications     Verify the changes and/or additions to your medication regime listed below are the same as discussed with your clinician today.  If any of these changes or additions are incorrect, please notify your healthcare provider.        CHANGE how you are taking these medications     Start Taking Instead of    trazodone (DESYREL) 50 MG tablet trazodone (DESYREL) 50 MG tablet    Dosage:  Take 3 tablets (150 mg total) by mouth every evening. Dosage:  Take 1 tablet (50 mg total) by mouth every evening.    Reason for Change:  Reorder            Verify that the below list of medications is an accurate representation of the medications you are currently taking.  If none reported, the list may be blank. If incorrect, please contact your healthcare provider.  "Carry this list with you in case of emergency.           Current Medications     aspirin (ECOTRIN) 81 MG EC tablet Take 1 tablet (81 mg total) by mouth once daily.    atorvastatin (LIPITOR) 80 MG tablet Take 1 tablet (80 mg total) by mouth once daily.    buPROPion (WELLBUTRIN XL) 150 MG TB24 tablet Take 2 tablets (300 mg total) by mouth once daily.    BUTALBITAL/ASPIRIN/CAFFEINE (FIORINAL ORAL) Take by mouth as needed.    DOC-Q-LACE 100 mg capsule TAKE ONE CAPSULE BY MOUTH THREE TIMES DAILY AS NEEDED FOR CONSTIPATION    doxycycline (MONODOX) 100 MG capsule     fluoxetine (PROZAC) 20 MG capsule Take 2 capsules (40 mg total) by mouth once daily.    hydrocodone-acetaminophen 10-325mg (NORCO)  mg Tab Take 1 tablet by mouth every 6 (six) hours as needed.    hyoscyamine (ANASPAZ,LEVSIN) 0.125 mg Tab TAKE 1 TABLET (125 MCG TOTAL) BY MOUTH EVERY 6 (SIX) HOURS AS NEEDED.    levothyroxine (SYNTHROID) 100 MCG tablet Take 1 tablet (100 mcg total) by mouth before breakfast.    lidocaine-prilocaine (EMLA) cream     ondansetron (ZOFRAN) 8 MG tablet Take 1 tablet (8 mg total) by mouth every 12 (twelve) hours as needed for Nausea.    oxybutynin (DITROPAN XL) 15 MG TR24     pantoprazole (PROTONIX) 40 MG tablet Take 1 tablet (40 mg total) by mouth once daily.    polyethylene glycol (GLYCOLAX) 17 gram PwPk Take 17 g by mouth 2 (two) times daily.    SENNOSIDES (SENNA LAX ORAL) Take by mouth as needed.    trazodone (DESYREL) 50 MG tablet Take 3 tablets (150 mg total) by mouth every evening.    VOLTAREN 1 % Gel            Clinical Reference Information           Your Vitals Were     BP Pulse Temp Height Weight Last Period    112/66 (BP Location: Right arm, Patient Position: Sitting, BP Method: Manual) 48 97.8 °F (36.6 °C) (Oral) 5' 4" (1.626 m) 77.6 kg (171 lb 1.2 oz) (LMP Unknown)    BMI                29.37 kg/m2          Blood Pressure          Most Recent Value    BP  112/66      Allergies as of 5/4/2017     Imitrex [Sumatriptan " Succinate]    Penicillins    Percocet [Oxycodone-acetaminophen]    Topamax [Topiramate]    Vancomycin    (D)-limonene Flavor    Bactrim [Sulfamethoxazole-trimethoprim]    Butorphanol Tartrate    Darvocet A500 [Propoxyphene N-acetaminophen]    Fentanyl    Phenytoin Sodium Extended    White Petrolatum-zinc    Zinc Oxide-white Petrolatum    Latex, Natural Rubber      Immunizations Administered on Date of Encounter - 5/4/2017     None      Orders Placed During Today's Visit     Future Labs/Procedures Expected by Expires    TSH  5/4/2017 5/4/2018      Language Assistance Services     ATTENTION: Language assistance services are available, free of charge. Please call [phone].      ATENCIÓN: Si habla israel, tiene a morgan disposición servicios gratuitos de asistencia lingüística. Llame al [phone].     CHÚ Ý: N?u b?n nói Ti?ng Vi?t, có các d?ch v? h? tr? ngôn ng? mi?n phí dành cho b?n. G?i s? [phone].         [facility] complies with applicable Federal civil rights laws and does not discriminate on the basis of race, color, national origin, age, disability, or sex.

## 2017-05-04 NOTE — PROGRESS NOTES
"Subjective:       Patient ID: Tasha Hawley is a 61 y.o. female.    Chief Complaint: Chronic Pain    HPI - Mrs. Hawley came with her .  For the fourth visit in a row, she "forgot" to bring in her medications.  I told her frankly that I would refuse to see her again if she came without the medications. Her polypharmacy is affecting control of multiple medical issues. She and  seemed to understand.    She still has insomnia.  Taking unclear meds, but at least trazodone 100 at night.  I advised increasing benzo d/t anxiety at sleep; her psychiatrist advised stopping it d/t fall risk.  She followed his advice and is still having sleep disturbance.   relates irritability and says she's "just mean" to him.  Wonders if her medications could be causing that (possibly).  She continues to have widespread pain.  Wearing her urinary catheter today.  Last TSH showed she was undertreated a few months ago.  She's having hot flashes and wonders if this is the problem.    PMH:  Severe Insomnia with daytime fatigue  Chronic low back pain with narcotic use.  Indwelling narcotic pump  History CVA with dysarthria  Neurogenic bladder, with recurrent UTI's.  SP catheter placed Nov 2016  Hypokalemia  Hypothyroidism  CECI, off cpap  CAD, with ACS in Jan 2016 - cc showed subtot mid LAD lesion without PCI; ischemic CM with EF 40% and chronic LE edema. Followed by [facility]  Anxiety and Depression  Chronic constipation, likely d/t ongoing narcotic use  Intermittent nausea     Meds: Long, involved list. Reviewed and reconciled in EPIC with patient during visit today, but still unsure what she actually has at home.    Review of Systems   Constitutional: Negative for fever.   HENT: Negative for congestion.    Respiratory: Negative for shortness of breath.    Cardiovascular: Negative for chest pain.   Gastrointestinal: Negative for abdominal pain.   Genitourinary: Positive for difficulty urinating. "   Musculoskeletal: Positive for arthralgias, back pain and myalgias.   Skin: Positive for rash (widespread AK's).   Neurological: Negative for headaches.   Psychiatric/Behavioral: Positive for sleep disturbance.       Objective:      Physical Exam   Constitutional: She appears well-developed and well-nourished. No distress.   HENT:   Head: Normocephalic and atraumatic.   Musculoskeletal: She exhibits edema.   Neurological: She is alert.   Skin: She is not diaphoretic.   Widespread AK's   Psychiatric: She has a normal mood and affect.   Nursing note and vitals reviewed.      Assessment:       1. Insomnia, unspecified type    2. Anxiety    3. Chronic pain syndrome    4. Frequent falls    5. Narcotic dependency, continuous    6. Urinary retention    7. Primary hypothyroidism        Plan:       Tasha was seen today for chronic pain.    Diagnoses and all orders for this visit:    Insomnia, unspecified type - unstable, uncontrolled.  Unclear medication list.  For now, make trazodone 150 nightly.  Bring in pill bottles for reconciliation next visit or I will refuse to see her  -     trazodone (DESYREL) 50 MG tablet; Take 3 tablets (150 mg total) by mouth every evening.    Anxiety - unstable, manifest by irritability, temper and sleep disturbance.  Will do med rec next visit and plan a way forward with her psychiatrist    Chronic pain syndrome - stable, on a pump    Frequent falls    Narcotic dependency, continuous    Urinary retention - stable, with a catheter    Primary hypothyroidism - unstable, needs repeat labs.  Will adjust meds based on results  -     TSH; Future    rtc prn, or 6 weeks.    PAPO Hodges MD MPH  Staff Internist

## 2017-05-04 NOTE — PROGRESS NOTES
Attempt to complete follow up for Outpatient Care Management; pt does not have voicemail, will mail letter. Modesta [facility]ENDOZA-hospitals    Follow-up Plan:  -Continue to educate about CHF. Review signs and symptoms of CHF flare up. Encourage patient to follow medication and treatment regimen. Encourage patient to maintain follow up with doctors.  -Continue to educate about fall prevention and safety in home setting. Encourage patient to follow medication and treatment regimen. Encourage patient to maintain follow up with doctors.  -Continue to educate about the bodies response to pain. Encourage patient to follow medication and treatment regimen. Encourage patient to maintain follow up with doctors.  _Follow-up with  RE: Patient reports Bilateral LE are double their normal size. And patient requesting for PT and OT to be re started with her home health  -Follow up with [facility] to see if they were able to get orders for HH PT AND OT.  --Collaborate with hospitals SW about available resources.

## 2017-05-04 NOTE — LETTER
May 4, 2017    Tasha Hawley  [address]  [place] [address]             [facility] [address] [place] [address] Dear Mrs. Hawley,    I work with [facility]'s Outpatient Case Management Department. I have been unsuccessful at reaching you to follow-up to see how you have been doing. Please call me back at your earliest convenience to discuss your health care needs.      I can be reached at [phone] from 8:00AM to 4:30 PM on Monday thru Friday. [facility] also has a program where a nurse is available 24/7 to answer questions or provide medical advice, their number is [phone].    Thanks,      Jackie Polk RN  Outpatient Case Management

## 2017-05-05 ENCOUNTER — PATIENT MESSAGE (OUTPATIENT)
Dept: INTERNAL MEDICINE | Facility: CLINIC | Age: 62
End: 2017-05-05

## 2017-05-08 ENCOUNTER — OUTPATIENT CASE MANAGEMENT (OUTPATIENT)
Dept: ADMINISTRATIVE | Facility: OTHER | Age: 62
End: 2017-05-08

## 2017-05-08 DIAGNOSIS — E03.9 HYPOTHYROIDISM, UNSPECIFIED TYPE: ICD-10-CM

## 2017-05-08 RX ORDER — LEVOTHYROXINE SODIUM 100 UG/1
100 TABLET ORAL
Qty: 30 TABLET | Refills: 0 | Status: SHIPPED | OUTPATIENT
Start: 2017-05-08 | End: 2017-05-18 | Stop reason: SDUPTHER

## 2017-05-08 NOTE — TELEPHONE ENCOUNTER
----- Message from Laine Veliz sent at 5/8/2017 11:31 AM CDT -----  Contact: self/[phone]  Pt called in regards to checking the status of her Rx for synthroid.        Please advise

## 2017-05-08 NOTE — PROGRESS NOTES
5/8/17: LCSW attempted to contact pt for follow up; no answer, left voicemail.  LCSW contacted a second number and left a message with pt's  to call him back.

## 2017-05-08 NOTE — TELEPHONE ENCOUNTER
I did refill the thyroid but Dr. Hodges wants to increase the dose of her medication; he said he wanted to discuss this with her at her next office visit.  For that reason a normal ascending and 30 pills.  Please forward this to him in case he wants to make a change before the next office visit.  Thank you

## 2017-05-09 ENCOUNTER — OUTPATIENT CASE MANAGEMENT (OUTPATIENT)
Dept: ADMINISTRATIVE | Facility: OTHER | Age: 62
End: 2017-05-09

## 2017-05-09 NOTE — PROGRESS NOTES
5/9/17: LCSW received voicemail from pt; attempted to call pt back.  Pt's  answered the phone and stated his wife was undergoing OT evaluation.  Pt's  gave the phone to OT named Adriana.  LCSW introduced self to OT and stated he could call pt back at a later time to follow up with her.

## 2017-05-10 ENCOUNTER — TELEPHONE (OUTPATIENT)
Dept: INTERNAL MEDICINE | Facility: CLINIC | Age: 62
End: 2017-05-10

## 2017-05-10 ENCOUNTER — TELEPHONE (OUTPATIENT)
Dept: PAIN MEDICINE | Facility: CLINIC | Age: 62
End: 2017-05-10

## 2017-05-10 NOTE — TELEPHONE ENCOUNTER
----- Message from Nelida Magdaleno sent at 5/9/2017  1:44 PM CDT -----  _x  1st Request  _  2nd Request  _  3rd Request        Who: pt.  Dangelo    Why: pt. Would like to know can  see on her on tomorrow before 1:00p.m. Please call to discuss asap    What Number to Call Back:[phone]    When to Expect a call back: (Before the end of the day)   -- if the call is after 12:00, the call back will be tomorrow.

## 2017-05-10 NOTE — TELEPHONE ENCOUNTER
Called pt and spoke with patient  today about seeing Dr. Borges today; because pt is unbalance and has had several falls. Patient was advise that Dr. Borges is out today and the pt is schedule for her follow up on tomorrow with Dr. Borges. Also, pt  has an schedule appointment with Dr. Govea today and her  states he might bring her to the ED to see why pt is so unbalance. Patient  states he might cancel her appt with Dr. Govea and bring her to the [facility] in [place], because patient has swelling in both legs and needs some of the fluid released from her legs.

## 2017-05-11 ENCOUNTER — TELEPHONE (OUTPATIENT)
Dept: PAIN MEDICINE | Facility: CLINIC | Age: 62
End: 2017-05-11

## 2017-05-11 NOTE — TELEPHONE ENCOUNTER
----- Message from Satinder Lee sent at 5/10/2017  9:32 AM CDT -----  _  1st Request  X_  2nd Request  _  3rd Request        Who: JUDIE YO [[id number]]    Why: Pt can not make it to her appt tomorrow due to death in the family. She would like to reschedule. Please call to reschedule.    What Number to Call Back:[phone]    When to Expect a call back: (Before the end of the day)   -- if the call is after 12:00, the call back will be tomorrow.

## 2017-05-12 ENCOUNTER — OUTPATIENT CASE MANAGEMENT (OUTPATIENT)
Dept: ADMINISTRATIVE | Facility: OTHER | Age: 62
End: 2017-05-12

## 2017-05-12 ENCOUNTER — TELEPHONE (OUTPATIENT)
Dept: INTERNAL MEDICINE | Facility: CLINIC | Age: 62
End: 2017-05-12

## 2017-05-12 NOTE — TELEPHONE ENCOUNTER
----- Message from Jackie Polk RN sent at 5/12/2017  9:39 AM CDT -----  Contact: Tasha Hawley [phone]    Hi. This is Jackie Polk RN. I am with the Outpatient case management team with [facility]. I received a referral on the above patient. I spoke with patient today on the telephone. Pt reports she has fallen numerous times this week with bruises to her arms and legs. Pt reports she is also retaining fluid in legs but had not weighed herself. Pt reminded of her appointment with you next week as well as needing to bring all of her medications with her for the visit. Pts  was going to bring her to the ED yesterday but pt refused due to a death in the family and having the wake to attend.     Please notify patient of your recommendations.     Thank you,  Jackie Polk RN

## 2017-05-12 NOTE — PROGRESS NOTES
Spoke to pt who reports she had a death in the family this week and went to the wake with her  yesterday in her wheelchair. Patient reports that she uses a walker to aid with ambulation as well as a wheelchair. Patient reports having numerous falls in the last week. Pt reports she only has some bruises to her arms and legs and denies needing to go to the doctor or ED. Discussed information about fall prevention and safety in home setting.  Discussed importance of adherence to medication regimen and follow-up with doctors. Reviewed pts upcoming appointments and reminded pt to be sure to bring all of her medications with her to her appointment with Dr. Hodges on 5/18. Pt verbalized understanding. Pt reports she was reactivated with [facility] and her last HH nurse visit was yesterday but PT and OT will be starting next week. Patient reports that she is working with [facility]. Patient reports that she is unsure of her weight on today. Patient reports that Dr. Hodges took off her Unna boots this week and will reassess the swelling in her bilateral lower extremities on 5/18/17. Patient reports that she feels that she is holding on to fluid.  Discussed  information about CHF, reviewed signs and symptoms of CHF flare ups. Discussed the importance of daily monitoring and logging of weight. Pt reports her  wanted to take her to the ED yesterday due to her frequent falls and fluid retention but she refuses to go to the ED. Discussed with pt the importance of following up with medical appointments as well as seeking care if the need arises. Will in basket Dr. Hodges in regards to pts fluid retention and numerous falls this week. Reviewed my contact information with pt as well as [facility] on [facility]. Will follow up. RANDY Polk RN-[facility]          Follow-up Plan:  -Continue to educate about CHF. Review signs and symptoms of CHF flare up. Encourage patient to follow medication and treatment regimen. Encourage patient to  maintain follow up with doctors.  -Continue to educate about fall prevention and safety in home setting. Encourage patient to follow medication and treatment regimen. Encourage patient to maintain follow up with doctors.  -Continue to educate about the bodies response to pain. Encourage patient to follow medication and treatment regimen. Encourage patient to maintain follow up with doctors.  --Collaborate with [facility] SW about available resources.

## 2017-05-14 ENCOUNTER — HOSPITAL ENCOUNTER (EMERGENCY)
Facility: HOSPITAL | Age: 62
Discharge: HOME | End: 2017-05-14
Attending: EMERGENCY MEDICINE
Payer: MEDICARE

## 2017-05-14 VITALS
RESPIRATION RATE: 18 BRPM | BODY MASS INDEX: 26.63 KG/M2 | SYSTOLIC BLOOD PRESSURE: 134 MMHG | WEIGHT: 156 LBS | HEART RATE: 62 BPM | TEMPERATURE: 98 F | DIASTOLIC BLOOD PRESSURE: 60 MMHG | OXYGEN SATURATION: 97 % | HEIGHT: 64 IN

## 2017-05-14 DIAGNOSIS — M25.511 ACUTE PAIN OF RIGHT SHOULDER: ICD-10-CM

## 2017-05-14 DIAGNOSIS — S09.90XA HEAD INJURY: ICD-10-CM

## 2017-05-14 DIAGNOSIS — M54.2 NECK PAIN: ICD-10-CM

## 2017-05-14 DIAGNOSIS — M79.632 LEFT FOREARM PAIN: ICD-10-CM
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
## 2017-05-14 DIAGNOSIS — S06.9X1A CLOSED HEAD INJURY WITH BRIEF LOSS OF CONSCIOUSNESS: Primary | ICD-10-CM

## 2017-05-14 DIAGNOSIS — M25.512 ACUTE PAIN OF LEFT SHOULDER: ICD-10-CM

## 2017-05-14 DIAGNOSIS — M25.552 LEFT HIP PAIN: ICD-10-CM

## 2017-05-14 DIAGNOSIS — M79.631 RIGHT FOREARM PAIN: ICD-10-CM

## 2017-05-14 LAB
ALBUMIN SERPL BCP-MCNC: 3.5 G/DL
ALP SERPL-CCNC: 113 U/L
ALT SERPL W/O P-5'-P-CCNC: 11 U/L
ANION GAP SERPL CALC-SCNC: 8 MMOL/L
AST SERPL-CCNC: 11 U/L
BASOPHILS # BLD AUTO: 0.01 K/UL
BASOPHILS NFR BLD: 0.2 %
BILIRUB SERPL-MCNC: 0.5 MG/DL
BNP SERPL-MCNC: 91 PG/ML
BUN SERPL-MCNC: 8 MG/DL
CALCIUM SERPL-MCNC: 9 MG/DL
CHLORIDE SERPL-SCNC: 101 MMOL/L
CO2 SERPL-SCNC: 31 MMOL/L
CREAT SERPL-MCNC: 0.7 MG/DL
DIFFERENTIAL METHOD: ABNORMAL
EOSINOPHIL # BLD AUTO: 0.1 K/UL
EOSINOPHIL NFR BLD: 1.8 %
ERYTHROCYTE [DISTWIDTH] IN BLOOD BY AUTOMATED COUNT: 14.5 %
EST. GFR  (AFRICAN AMERICAN): >60 ML/MIN/1.73 M^2
EST. GFR  (NON AFRICAN AMERICAN): >60 ML/MIN/1.73 M^2
GLUCOSE SERPL-MCNC: 83 MG/DL
HCT VFR BLD AUTO: 32.1 %
HGB BLD-MCNC: 10.3 G/DL
LYMPHOCYTES # BLD AUTO: 1.3 K/UL
LYMPHOCYTES NFR BLD: 25.4 %
MCH RBC QN AUTO: 26.9 PG
MCHC RBC AUTO-ENTMCNC: 32.1 %
MCV RBC AUTO: 84 FL
MONOCYTES # BLD AUTO: 0.4 K/UL
MONOCYTES NFR BLD: 8.1 %
NEUTROPHILS # BLD AUTO: 3.3 K/UL
NEUTROPHILS NFR BLD: 64.5 %
PLATELET # BLD AUTO: 212 K/UL
PMV BLD AUTO: 9.8 FL
POTASSIUM SERPL-SCNC: 3.9 MMOL/L
PROT SERPL-MCNC: 6.7 G/DL
RBC # BLD AUTO: 3.83 M/UL
SODIUM SERPL-SCNC: 140 MMOL/L
T4 FREE SERPL-MCNC: 0.89 NG/DL
TROPONIN I SERPL DL<=0.01 NG/ML-MCNC: <0.006 NG/ML
TSH SERPL DL<=0.005 MIU/L-ACNC: 21.39 UIU/ML
WBC # BLD AUTO: 5.04 K/UL

## 2017-05-14 PROCEDURE — 84484 ASSAY OF TROPONIN QUANT: CPT

## 2017-05-14 PROCEDURE — 85025 COMPLETE CBC W/AUTO DIFF WBC: CPT

## 2017-05-14 PROCEDURE — 93005 ELECTROCARDIOGRAM TRACING: CPT

## 2017-05-14 PROCEDURE — 84439 ASSAY OF FREE THYROXINE: CPT

## 2017-05-14 PROCEDURE — 99284 EMERGENCY DEPT VISIT MOD MDM: CPT | Mod: 25

## 2017-05-14 PROCEDURE — 83880 ASSAY OF NATRIURETIC PEPTIDE: CPT

## 2017-05-14 PROCEDURE — 84443 ASSAY THYROID STIM HORMONE: CPT

## 2017-05-14 PROCEDURE — 80053 COMPREHEN METABOLIC PANEL: CPT

## 2017-05-14 PROCEDURE — 25000003 PHARM REV CODE 250: Performed by: NURSE PRACTITIONER

## 2017-05-14 PROCEDURE — 36000 PLACE NEEDLE IN VEIN: CPT

## 2017-05-14 RX ORDER — ALPRAZOLAM 0.5 MG/1
TABLET ORAL
Qty: 60 TABLET | Refills: 0 | OUTPATIENT
Start: 2017-05-14

## 2017-05-14 RX ORDER — HYDROCODONE BITARTRATE AND ACETAMINOPHEN 10; 325 MG/1; MG/1
1 TABLET ORAL
Status: COMPLETED | OUTPATIENT
Start: 2017-05-14 | End: 2017-05-14

## 2017-05-14 RX ADMIN — HYDROCODONE BITARTRATE AND ACETAMINOPHEN 1 TABLET: 10; 325 TABLET ORAL at 03:05

## 2017-05-14 NOTE — ED AVS SNAPSHOT
[facility] [address]  [place] [address]               Tasha Hawley   2017  2:11 PM   ED    Description:  Female : 1955   Department:  [facility]           Your Care was Coordinated By:     Provider Role From To    Enzo Armenta Jr., MD Attending Provider [phone] --    HEVER Jason Nurse Practitioner [phone] --      Reason for Visit     Fall           Diagnoses this Visit        Comments    Closed head injury with brief loss of consciousness    -  Primary     Head injury         Fall         Acute pain of left shoulder         Acute pain of right shoulder         Left hip pain         Left forearm pain         Right forearm pain         Neck pain           ED Disposition     None           To Do List           Follow-up Information     Follow up with Mesfin Hodges Ii, MD. Schedule an appointment as soon as possible for a visit in 2 days.    Specialty:  Internal Medicine    Contact information:    [address]  [facility] [address]  [phone]        [facility]     [facility] -  Assistance  Unless otherwise directed by your provider, please contact [facility], our nurse care line that is available for  assistance.     Registered nurses in the [facility] provide: appointment scheduling, clinical advisement, health education, and other advisory services.  Call: [phone] (toll free)               Medications           Message regarding Medications     Verify the changes and/or additions to your medication regime listed below are the same as discussed with your clinician today.  If any of these changes or additions are incorrect, please notify your healthcare provider.        These medications were administered today        Dose Freq    hydrocodone-acetaminophen 10-325mg per tablet 1 tablet 1 tablet ED 1 Time    Sig: Take 1 tablet by mouth ED  1 Time.    Class: Normal    Route: Oral    Cosign for Ordering: Required by Enzo Armenta Jr., MD           Verify that the below list of medications is an accurate representation of the medications you are currently taking.  If none reported, the list may be blank. If incorrect, please contact your healthcare provider. Carry this list with you in case of emergency.           Current Medications     aspirin (ECOTRIN) 81 MG EC tablet Take 1 tablet (81 mg total) by mouth once daily.    atorvastatin (LIPITOR) 80 MG tablet Take 1 tablet (80 mg total) by mouth once daily.    buPROPion (WELLBUTRIN XL) 150 MG TB24 tablet Take 2 tablets (300 mg total) by mouth once daily.    BUTALBITAL/ASPIRIN/CAFFEINE (FIORINAL ORAL) Take by mouth as needed.    DOC-Q-LACE 100 mg capsule TAKE ONE CAPSULE BY MOUTH THREE TIMES DAILY AS NEEDED FOR CONSTIPATION    doxycycline (MONODOX) 100 MG capsule     fluoxetine (PROZAC) 20 MG capsule Take 2 capsules (40 mg total) by mouth once daily.    hydrocodone-acetaminophen 10-325mg (NORCO)  mg Tab Take 1 tablet by mouth every 6 (six) hours as needed.    hyoscyamine (ANASPAZ,LEVSIN) 0.125 mg Tab TAKE 1 TABLET (125 MCG TOTAL) BY MOUTH EVERY 6 (SIX) HOURS AS NEEDED.    levothyroxine (SYNTHROID) 100 MCG tablet Take 1 tablet (100 mcg total) by mouth before breakfast.    lidocaine-prilocaine (EMLA) cream     ondansetron (ZOFRAN) 8 MG tablet Take 1 tablet (8 mg total) by mouth every 12 (twelve) hours as needed for Nausea.    oxybutynin (DITROPAN XL) 15 MG TR24     pantoprazole (PROTONIX) 40 MG tablet Take 1 tablet (40 mg total) by mouth once daily.    polyethylene glycol (GLYCOLAX) 17 gram PwPk Take 17 g by mouth 2 (two) times daily.    SENNOSIDES (SENNA LAX ORAL) Take by mouth as needed.    trazodone (DESYREL) 50 MG tablet Take 3 tablets (150 mg total) by mouth every evening.    VOLTAREN 1 % Gel            Clinical Reference Information           Your Vitals Were     BP Pulse Temp Resp Height Weight  "   128/60 68 97.8 °F (36.6 °C) 20 5' 4" (1.626 m) 70.8 kg (156 lb)    Last Period SpO2 BMI          (LMP Unknown) 100% 26.78 kg/m2        Allergies as of 5/14/2017        Reactions    Imitrex [Sumatriptan Succinate] Shortness Of Breath    Penicillins Shortness Of Breath    Other reaction(s): Jittery    Percocet [Oxycodone-acetaminophen] Anaphylaxis    Topamax [Topiramate] Shortness Of Breath    Vancomycin Shortness Of Breath    Rash    (D)-limonene Flavor     Other reaction(s): difficult intubation  Other reaction(s): Jittery  Other reaction(s): Difficulty breathing  Other reaction(s): Difficulty breathing  Other reaction(s): Jittery  Other reaction(s): Difficulty breathing    Bactrim [Sulfamethoxazole-trimethoprim] Nausea And Vomiting    Other reaction(s): Resp Depression  Other reaction(s): Anaphylaxis    Butorphanol Tartrate     Darvocet A500 [Propoxyphene N-acetaminophen]     Other reaction(s): Jittery  Other reaction(s): Difficulty breathing    Fentanyl     Other reaction(s): Vomiting  Other reaction(s): Nausea  Other reaction(s): Itching  swelling    Phenytoin Sodium Extended     pATIENT DENIES EVER HAVING THIS MEDICATION    White Petrolatum-zinc     Zinc Oxide-white Petrolatum     Other reaction(s): Difficulty breathing    Latex, Natural Rubber Itching, Rash      Immunizations Administered on Date of Encounter - 5/14/2017     None      ED Micro, Lab, POCT     Start Ordered       Status Ordering Provider    05/14/17 1503 05/14/17 1502  TSH  Add-on      Completed     05/14/17 1438 05/14/17 1438  TSH  Once      In process     05/14/17 1437 05/14/17 1438  CBC auto differential  STAT      Final result     05/14/17 1437 05/14/17 1438  Comprehensive metabolic panel  STAT      Final result     05/14/17 1437 05/14/17 1438  Troponin I  STAT      Final result     05/14/17 1437 05/14/17 1438  Brain natriuretic peptide  STAT      Final result       ED Imaging Orders     Start Ordered       Status Ordering Provider    " "05/14/17 1442 05/14/17 1441  X-Ray Forearm Left  1 time imaging      Final result     05/14/17 1440 05/14/17 1439  X-Ray Shoulder Trauma Right  1 time imaging      Final result     05/14/17 1439 05/14/17 1439  X-Ray Forearm Right  1 time imaging      Final result     05/14/17 1438 05/14/17 1438  X-Ray Shoulder Trauma Left  1 time imaging      Final result     05/14/17 1438 05/14/17 1438  X-Ray Hips Bilateral 2 View Incl AP Pelvis  1 time imaging      Final result     05/14/17 1437 05/14/17 1438  CT Head Without Contrast  1 time imaging      Final result     05/14/17 1437 05/14/17 1438  CT Cervical Spine Without Contrast  1 time imaging      Final result         Discharge Instructions           Exercises to Prevent Falls  Certain types of exercises may help make you less likely to fall. Try the ones below. Or do other exercises that your health care provider suggests. Depending on your health, you may need to start slowly. Don't let that stop you. Even small amounts of exercise can help you. Be sure to talk to your health care provider before starting any exercise program.    Improve balance  Many types of exercise can help improve balance. Marcin chi and yoga are good examples. Here's another one to try. You can do it anytime and almost anywhere.  · Stand next to a counter or solid support.  · Push yourself up onto your tiptoes.  · Hold for 5 seconds. If you start to lose your balance, hold on to the counter.  · Rest and repeat 5 times. Work up to holding for 20 to 30 seconds, if you can.    Increase flexibility  Being more flexible makes it easier for you to move around safely. Try exercises like the seated hamstring stretch.  · Sit in a chair and put one foot on a stool.  · Straighten your leg and reach with both hands down either side of your leg. Reach as far down your leg as you can.  · Hold for about 20 seconds.  · Go back to the starting position. Then repeat 5 times. Switch legs.    Build strength  "Resistance" " exercises help build strength. You can do them without equipment. Or you can use weights, elastic bands, or special machines. One such exercise is called the biceps curl. You can hold a 1-pound weight or even a can of soup. Do this exercise at least 3 times a week. Strive for every day.  · Sit up straight in a chair.  · Keep your elbow close to your body and your wrist straight.  · Bend your arm, moving your hand up to your shoulder. Then slowly lower your arm.  · Repeat 5 times. Switch to the other arm.    Build your staying power  Aerobic exercises make your heart and lungs stronger so you can keep moving longer. Walking and swimming are two of the best types of exercises you can do. Using a stationary bike is great, too. Find an aerobic exercise that you enjoy. Start slowly and build up. Even 5 minutes is helpful. Aim for a goal of 30 minutes, at least 3 times a week. You don't have to do 30 minutes in 1 session. Break it up and walk a little throughout the day.     More helpful tips  · Start easy. Slowly work up to doing more.  · Talk with your health care provider about the best exercises for you.  · Call senior centers or health clubs about exercise programs.  · If needed, have a family member watch you walk every so often to check your stability.  · Exercise with a friend. Choose an activity you both enjoy.  · Consider matty chi or yoga to strengthen your balance.  · Try exercises that you can do anytime, anywhere. Here are 2 examples. Have someone with you when you first try these:  ¨ Practice walking by placing 1 foot right in front of the other.  ¨ Stand up and sit down 10 times. Repeat this throughout the day.   Date Last Reviewed: 6/13/2015  © 3895-5221 [employer]. [address] [place] [address], [place] [address]. All rights reserved. This information is not intended as a substitute for professional medical care. Always follow your healthcare professional's  instructions.          Arthralgia    Arthralgia is the term for pain in or around the joint. It is a symptom, not a disease. This pain may involve one or more joints. In some cases, the pain moves from joint to joint.  There are many causes for joint pain. These include:  · Injury  · Osteoarthritis (wearing out of the joint surface)  · Gout (inflammation of the joint due to crystals in the joint fluid)  · Infection inside the joint    · Bursitis (inflammation of the fluid-filled sacs around the joint)  · Autoimmune disorders such as rheumatoid arthritis or lupus  · Tendonitis (inflamation of chords that attach muscle to bone)  Home care  · Rest the involved joint(s) until your symptoms improve.   · You may be prescribed pain medication. If none is prescribed, you may use acetaminophen or ibuprofen to control pain and inflammation.  Follow up  Follow up with your healthcare provider or our staff as advised.  When to seek medical care  Contact your healthcare provider right away if any of the following occurs:  · Pain, swelling, or redness of joint increases  · Pain worsens or recurs after a period of improvement  · Pain moves to other joints  · You cannot bear weight on the affected joint   · You cannot move the affected joint  · Joint appears deformed  · New rash appears  · Fever of 101ºF (38.8ºC) or higher, or as directed by your healthcare provider  Date Last Reviewed: 4/26/2015  © 6234-6979 [employer]. [address] [place], [place] [address]. All rights reserved. This information is not intended as a substitute for professional medical care. Always follow your healthcare professional's instructions.          Neck Pain    There are several possible causes of neck pain when there is no injury:  · You can get a minor ligament sprain or muscle strain from a sudden minor neck movement. Sleeping with your neck in an awkward position can also cause this.  · Some people respond to emotional stress by  tensing the muscles of their neck, shoulders, and upper back. Chronic spasm in these muscles can cause neck pain and sometimes headaches.  · Gradual wear and tear of the joints in the spine can cause degenerative arthritis. This can be a source of occasional or chronic neck pain.  · The spinal disks may bulge and put pressure on a nearby spinal nerve. This can happen as a natural result of aging or repeated small injuries to the neck. The spinal disks are the cushions between each spinal bone. This causes tingling, pain, or numbness that spreads from the neck to the shoulder, arm, or hand on one side.  Acute neck pain usually gets better in 1 to 2 weeks. Neck pain related to disk disease, arthritis in the spinal joints, or spinal stenosis can become chronic and last for months or years. Spinal stenosis is narrowing of the spinal canal.  X-rays are usually not ordered for the initial evaluation of neck pain. However, X-rays may be done if you had a forceful physical injury, such as a car accident or fall. If pain continues and doesnt respond to medical treatment, X-rays and other tests may be done at a later time.  Home care  · Rest and relax the muscles. Use a comfortable pillow that supports the head. It should also help keep the spine in a neutral position. The position of the head should not be tilted forward or backward. A rolled up towel may help for a custom fit.  · Some people find relief with heat. Heat can be applied with either a warm shower or bath or a moist towel heated in the microwave and massage. Others prefer cold packs. You can make an ice pack by filling a plastic bag that seals at the top with ice cubes or crushed ice and then wrapping it with a thin towel. Try both and use the method that feels best for 15 to 20 minutes, several times a day.  · Whether using ice or heat, be careful that you do not injure your skin. Never put ice directly on the skin. Always wrap the ice in a towel or other type  of cloth.This is very important, especially in people with poor skin sensations.   · Try to reduce your stress level. Emotional stress can lead to neck muscle tension and get in the way of or delay the healing process.  · You may use over-the-counter pain medicine to control pain, unless another medicine was prescribed. If you have chronic liver or kidney disease or ever had a stomach ulcer or GI bleeding, talk with your healthcare provider before using these medicines.  Follow-up care  Follow up with your healthcare provider if your symptoms do not show signs of improvement after one week. Physical therapy or further tests may be needed.  If X-rays, CT scans, or MRI scans were taken, you will be told of any new findings that may affect your care.  Call 911  Call 911 if you have:  · Sudden weakness or numbness in one or both arms  · Neck swelling, difficulty or painful swallowing  · Difficulty breathing  · Chest pain  When to seek medical advice  Call your healthcare provider right away if any of these occur:  · Pain becomes worse or spreads into one or both arm  · Increasing headache  · Fever of 100.4°F (38°C) or above lasting for 24 to 48 hours  Date Last Reviewed: 7/1/2016  © 3372-5623 [employer]. [address] [place] [address]. All rights reserved. This information is not intended as a substitute for professional medical care. Always follow your healthcare professional's instructions.          The Shoulder Joint     The shoulder is made up of bones, muscles, ligaments, and tendons. They work together so you can reach, swing, and lift in comfort. Learning about the parts of the shoulder joint will help you to understand your shoulder problem.  The parts of the joint  The shoulder joint is where the humerus (upper arm bone) meets the scapula (shoulder blade):  · Muscles and ligaments help make up the joint. They attach to the shoulder blade and upper arm bone.  · At the top of the  shoulder blade are two bony knobs called the acromion and coracoid process.  · The subacromial space is between the top of the humerus and the acromion. This space is filled with tendons, muscles, and the subacromial bursa.  · The bursa is a sac of fluid that cushions shoulder parts as they move.     The supraspinatus muscle and tendon are located in the subacromial space. They help form the rotator cuff and are commonly injured in a rotator cuff tear.  Date Last Reviewed: 10/12/2015  © 1461-6587 [employer]. [address] [place] [address]. All rights reserved. This information is not intended as a substitute for professional medical care. Always follow your healthcare professional's instructions.            Discharge References/Attachments     HEAD INJURY WITH SLEEP MONITORING (ADULT) (ENGLISH)    CONTUSION, SHOULDER (ENGLISH)      Your Scheduled Appointments     May 18, 2017  9:40 AM CDT   Established Patient Visit with MD Thierry Lundberg II [facility] - Internal Medicine ([facility])    [address]  [place] [id number] [phone]            May 19, 2017  2:00 PM CDT   Bone Density with [facility], DEXA1   Thierry [facility] ([facility] )    [address]  [place] [id number] [phone]            Felix 15, 2017  2:00 PM CDT   Consult with Taurus Govea MD   [facility] ([facility])    [address]  [place] [id number] [phone]               [facility] complies with applicable Federal civil rights laws and does not discriminate on the basis of race, color, national origin, age, disability, or sex.        Language Assistance Services     ATTENTION: Language assistance services are available, free of charge. Please call [phone].      ATENCIÓN: Si habla español, tiene a morgan disposición servicios gratuitos de asistencia lingüística. Llame al  [phone].     VERONICA Ý: N?u b?n nói Ti?ng Vi?t, có các d?ch v? h? tr? ngôn ng? mi?n phí dành cho b?n. G?i s? [phone].

## 2017-05-14 NOTE — ED PROVIDER NOTES
"Encounter Date: 5/14/2017       History     Chief Complaint   Patient presents with    Fall     fell today hitting head on floor with possible LOC, complains of pain to forehead, face, both shoulders, both arms, left hip, left rib pain      Review of patient's allergies indicates:   Allergen Reactions    Imitrex [sumatriptan succinate] Shortness Of Breath    Penicillins Shortness Of Breath     Other reaction(s): Jittery    Percocet [oxycodone-acetaminophen] Anaphylaxis    Topamax [topiramate] Shortness Of Breath    Vancomycin Shortness Of Breath     Rash    (d)-limonene flavor      Other reaction(s): difficult intubation  Other reaction(s): Jittery  Other reaction(s): Difficulty breathing  Other reaction(s): Difficulty breathing  Other reaction(s): Jittery  Other reaction(s): Difficulty breathing    Bactrim [sulfamethoxazole-trimethoprim] Nausea And Vomiting     Other reaction(s): Resp Depression  Other reaction(s): Anaphylaxis    Butorphanol tartrate     Darvocet a500 [propoxyphene n-acetaminophen]      Other reaction(s): Jittery  Other reaction(s): Difficulty breathing    Fentanyl      Other reaction(s): Vomiting  Other reaction(s): Nausea  Other reaction(s): Itching  swelling    Phenytoin sodium extended      pATIENT DENIES EVER HAVING THIS MEDICATION    White petrolatum-zinc     Zinc oxide-white petrolatum      Other reaction(s): Difficulty breathing    Latex, natural rubber Itching and Rash     HPI Comments: 60yo female with a history of frequent falls for an unknown reason is here for a ground-level fall.  Pt states she was walking at home around 0400 with the assistance of her walker when, "all of the sudden I just started falling and I woke up on the floor.  I must have hit my head because it hurts and I passed out."  PT states she fell onto a ceramic tile floor.  Pt went to Urgent Care, but she was referred to the ED.  Pt denies CP, SOB, visual changes, and vomiting.  PT reports chronic back " pain.  Pt reports headache, neck pain, bilateral shoulder pain, bilateral forearm pain, left hip, and left sided rib pain.  She has not taken any of her home pain medication today.  Pt has chronic BLE, which the pt reports is worse than usual today.     Patient is a 61 y.o. female presenting with the following complaint: fall. The history is provided by the patient.   Fall   Illness onset: 0400. The fall occurred while walking. She fell from a height of 1 to 2 ft. She landed on a hard floor. There was no blood loss. The pain is present in the back, left shoulder, right shoulder, left hip and neck (bilateral forearms). The pain is at a severity of 10/10. She was ambulatory at the scene. There was no entrapment after the fall. Associated symptoms include neck pain, back pain, headaches and loss of consciousness. Pertinent negatives include no visual change, no fever, no abdominal pain, no nausea and no vomiting. The symptoms are aggravated by activity. The treatment provided no relief.     Past Medical History:   Diagnosis Date    Allergy     Anxiety     Arthritis     Carotid artery occlusion     Cataract     Depression     Edema     chronic    Fever blister     Hypothyroid     Iron deficiency anemia     Joint pain     Lumbar radiculopathy     with chronic pain    Ocular migraine     Sleep apnea     cpap     Past Surgical History:   Procedure Laterality Date    BACK SURGERY      x 12    CATARACT EXTRACTION W/  INTRAOCULAR LENS IMPLANT Left     Dr Coleman     HYSTERECTOMY  1975    endometriosis    pain pump placement      SPINE SURGERY  5-13-13    CERVICAL FUSION     Family History   Problem Relation Age of Onset    Cancer Mother 55     breast    Cancer Father      esophagus,had laryngectomy    Parkinsonism Maternal Grandmother     Tremor Maternal Grandmother     No Known Problems Brother     No Known Problems Brother     Heart disease Maternal Uncle     No Known Problems Sister     No Known  Problems Maternal Aunt     No Known Problems Paternal Aunt     No Known Problems Paternal Uncle     No Known Problems Maternal Grandfather     No Known Problems Paternal Grandmother     No Known Problems Paternal Grandfather     Melanoma Neg Hx     Amblyopia Neg Hx     Blindness Neg Hx     Cataracts Neg Hx     Diabetes Neg Hx     Glaucoma Neg Hx     Hypertension Neg Hx     Macular degeneration Neg Hx     Retinal detachment Neg Hx     Strabismus Neg Hx     Stroke Neg Hx     Thyroid disease Neg Hx      Social History   Substance Use Topics    Smoking status: Never Smoker    Smokeless tobacco: Never Used    Alcohol use No      Comment: denies     Review of Systems   Constitutional: Negative for chills and fever.   HENT: Negative for congestion.    Respiratory: Negative for cough and shortness of breath.    Cardiovascular: Negative for chest pain.   Gastrointestinal: Negative for abdominal pain, nausea and vomiting.   Musculoskeletal: Positive for arthralgias, back pain, myalgias and neck pain.   Skin: Negative for wound.   Neurological: Positive for loss of consciousness, weakness and headaches.   Psychiatric/Behavioral: Negative for confusion.       Physical Exam   Initial Vitals   BP Pulse Resp Temp SpO2   05/14/17 1406 05/14/17 1406 05/14/17 1406 05/14/17 1406 05/14/17 1406   128/60 68 20 97.8 °F (36.6 °C) 100 %     Physical Exam    Nursing note and vitals reviewed.  Constitutional: Vital signs are normal. She appears well-developed and well-nourished. She is active and cooperative. She is easily aroused.  Non-toxic appearance. She has a sickly appearance. She does not appear ill. No distress.   HENT:   Head: Normocephalic and atraumatic.   Mouth/Throat: Uvula is midline and oropharynx is clear and moist.   Eyes: Conjunctivae and EOM are normal.   Neck: Neck supple. Spinous process tenderness and muscular tenderness present. No edema, no erythema and normal range of motion present.        Cardiovascular: Normal rate, regular rhythm and normal heart sounds.   Pulmonary/Chest: Effort normal and breath sounds normal.   Abdominal: Soft. Normal appearance and bowel sounds are normal. She exhibits no distension. There is no tenderness. There is no rebound, no guarding and no CVA tenderness.   Genitourinary:   Genitourinary Comments: Motley leg-bag in place draining bright yellow urine.    Musculoskeletal:        Left shoulder: She exhibits decreased range of motion, tenderness, bony tenderness, pain and decreased strength. She exhibits no swelling, no effusion, no crepitus, no deformity, no laceration, no spasm and normal pulse.        Left elbow: She exhibits normal range of motion, no swelling, no effusion, no deformity and no laceration. No tenderness found. No radial head, no medial epicondyle, no lateral epicondyle and no olecranon process tenderness noted.        Left wrist: Normal.        Cervical back: She exhibits tenderness, bony tenderness and pain. She exhibits normal range of motion, no swelling, no edema, no deformity, no laceration, no spasm and normal pulse.        Thoracic back: She exhibits tenderness and pain. She exhibits normal range of motion, no bony tenderness, no swelling, no edema, no deformity, no laceration, no spasm and normal pulse.        Lumbar back: She exhibits tenderness and pain. She exhibits normal range of motion, no bony tenderness, no swelling, no edema, no deformity, no laceration, no spasm and normal pulse.        Left upper arm: She exhibits tenderness. She exhibits no bony tenderness, no swelling, no edema, no deformity and no laceration.        Left forearm: She exhibits tenderness. She exhibits no bony tenderness, no swelling, no edema, no deformity and no laceration.        Right lower leg: She exhibits tenderness and edema. She exhibits no bony tenderness, no swelling, no deformity and no laceration.        Left lower leg: She exhibits tenderness and  edema. She exhibits no bony tenderness, no swelling, no deformity and no laceration.   Bilateral LE edema extending to pelvis.   Pt has pressure dressings to BLE.     Pt reports pain with palpation to entire body.    Neurological: She is alert, oriented to person, place, and time and easily aroused. She has normal strength. GCS eye subscore is 4. GCS verbal subscore is 5. GCS motor subscore is 6.   Skin: Skin is warm, dry and intact. Bruising noted. No abrasion, no burn, no ecchymosis, no lesion, no rash and no abscess noted. No erythema.        Psychiatric: She has a normal mood and affect. Her speech is normal and behavior is normal. Judgment and thought content normal. Cognition and memory are normal.         ED Course   Procedures  Labs Reviewed   CBC W/ AUTO DIFFERENTIAL - Abnormal; Notable for the following:        Result Value    RBC 3.83 (*)     Hemoglobin 10.3 (*)     Hematocrit 32.1 (*)     MCH 26.9 (*)     All other components within normal limits   COMPREHENSIVE METABOLIC PANEL - Abnormal; Notable for the following:     CO2 31 (*)     All other components within normal limits   TROPONIN I   B-TYPE NATRIURETIC PEPTIDE   TSH   TSH     EKG Readings: (Independently Interpreted)   Initial Reading: No STEMI. Rhythm: Normal Sinus Rhythm. Heart Rate: 63. Ectopy: No Ectopy. Conduction: Normal. ST Segments: Normal ST Segments. Other Impression: No acute changes     Imaging Results         CT Cervical Spine Without Contrast (Final result) Result time:  05/14/17 15:36:16    Final result by Diaz Golden MD (05/14/17 15:36:16)    Impression:      No acute abnormality.       Electronically signed by: DIAZ GOLDEN MD  Date:     05/14/17  Time:    15:36     Narrative:    NON-CONTRAST CT OF THE CERVICAL SPINE     DATE:  05/14/2017     INDICATION:  W. 19.XXXA     TECHNIQUE:   1.25mm axial slices were also obtained of the cervical spine with sagittal and coronal reconstructions.     COMPARISON:  CT of the cervical spine  dated 03/02/2017     FINDINGS:  There is mild straightening of the normal cervical curvature. There is no fracture or dislocation.  Again noted are postsurgical changes of anterior cervical discectomy and fusion at C4-C7.  Orthopedic hardware appears intact.  Moderate to severe intervertebral disc space narrowing is identified at C3/4 with disc osteophyte complex.  Prevertebral soft tissues are unremarkable. The lung apices are clear.            CT Head Without Contrast (Final result) Result time:  05/14/17 15:29:33    Final result by Diaz Golden MD (05/14/17 15:29:33)    Impression:      No acute abnormality.       Electronically signed by: DIAZ GOLDEN MD  Date:     05/14/17  Time:    15:29     Narrative:    NON-CONTRAST CT OF THE HEAD    DATE:  The 05/14/2017     INDICATION:  S09.90 XA     TECHNIQUE:  Contiguous 5 mm axial slices were obtained in soft tissue and bone algorithm from the skull base to vertex.     COMPARISON:  Head CT dated 03/02/2017     FINDINGS:  There is no intra or extra-axial mass or hemorrhage. The normal gray-white differentiation is maintained. Ventricles and basal cisterns are symmetric and unremarkable. Sulci are appropriate for age. The orbits are symmetric and unremarkable. The paranasal sinuses and mastoid air cells are clear. There is no bony abnormality.            X-Ray Forearm Left (Final result) Result time:  05/14/17 15:13:52    Final result by Xiao rIizarry MD (05/14/17 15:13:52)    Impression:     No fracture or dislocation.      Electronically signed by: Xiao Irizarry MD  Date:     05/14/17  Time:    15:13     Narrative:    Left forearm, 2 views: No bone, joint or soft tissue abnormality is identified.            X-Ray Shoulder Trauma Left (Final result) Result time:  05/14/17 15:14:49    Final result by Xiao Irizarry MD (05/14/17 15:14:49)    Impression:     As above.      Electronically signed by: Xiao Irizarry MD  Date:     05/14/17  Time:    15:14     Narrative:     Left shoulder, 3 views: There are mild degenerative changes of the acromioclavicular joint.  The glenohumeral joint space is intact.  No fracture or dislocation is seen.            X-Ray Hips Bilateral 2 View Incl AP Pelvis (Final result) Result time:  05/14/17 15:15:36    Final result by Xiao Irizarry MD (05/14/17 15:15:36)    Impression:     No fracture or dislocation.      Electronically signed by: Xiao Irizarry MD  Date:     05/14/17  Time:    15:15     Narrative:    Bilateral hips, 2 views each including AP pelvis: Lumbar fusion hardware is noted.  Electronic stimulator device overlies the right abdomen.  The bilateral sacroiliac joints, hip joints and pubic symphysis show mild degenerative change.  No fracture or dislocation is detected.            X-Ray Forearm Right (Final result) Result time:  05/14/17 15:13:17    Final result by Xiao Irizarry MD (05/14/17 15:13:17)    Impression:     No fracture or dislocation.      Electronically signed by: Xiao Irizarry MD  Date:     05/14/17  Time:    15:13     Narrative:    Right forearm, 2 views: No bone, joint or soft tissue abnormality is identified.            X-Ray Shoulder Trauma Right (Final result) Result time:  05/14/17 15:14:20    Final result by Xiao Irizarry MD (05/14/17 15:14:20)    Impression:     No fracture or dislocation.      Electronically signed by: Xiao Irizarry MD  Date:     05/14/17  Time:    15:14     Narrative:    Right shoulder, 3 views: The acromioclavicular and glenohumeral joint spaces are intact.  No fracture or dislocation is identified.                 Medical Decision Making:   Initial Assessment:   60yo female here for a ground-level fall around 0400 with LOC.  Frequent falls. Reports bilateral shoulder, neck, back, forearm, left hip, and left rib pain.  LE edema worse than usual per patient.  Pt appears sickly but nontoxic.  Vitals stable, afebrile.   Differential Diagnosis:   Fracture, strain, sprain, contusion, ICH,  STEMI/NStemi, CHF, electrolyte derangement, dehydration, overdose  Clinical Tests:   Lab Tests: Ordered and Reviewed  Radiological Study: Ordered and Reviewed  Medical Tests: Ordered and Reviewed  ED Management:  Labs, EKG, Xrays, CT head and C-spine, PO norco  Xray and CTs negative for acute change. Mild anemia, but improved from patient's normal.  Labs otherwise unremarkable. Advised head injury precautions with sleep monitoring. Reviewed with pt and spouse.  F/u with PCP within 1-2 days and return to the ED if condition changes, progresses, or if there are concerns.  No RX due to pt's multiple home medications and frequent falls.                     ED Course     Clinical Impression:   The primary encounter diagnosis was Closed head injury with brief loss of consciousness. Diagnoses of Head injury, Fall, Acute pain of left shoulder, Acute pain of right shoulder, Left hip pain, Left forearm pain, Right forearm pain, and Neck pain were also pertinent to this visit.          Eugenie Rossi, HEVER  05/14/17 [id number]

## 2017-05-14 NOTE — ED NOTES
Pt reports she fell today, struck forehead on floor.  +LOC.  Reports pain to forehead, neck, bilateral shoulders, bilateral arms, bilateral legs, left hip, left lateral ribs.

## 2017-05-14 NOTE — DISCHARGE INSTRUCTIONS
"    Exercises to Prevent Falls  Certain types of exercises may help make you less likely to fall. Try the ones below. Or do other exercises that your health care provider suggests. Depending on your health, you may need to start slowly. Don't let that stop you. Even small amounts of exercise can help you. Be sure to talk to your health care provider before starting any exercise program.    Improve balance  Many types of exercise can help improve balance. Marcin chi and yoga are good examples. Here's another one to try. You can do it anytime and almost anywhere.  · Stand next to a counter or solid support.  · Push yourself up onto your tiptoes.  · Hold for 5 seconds. If you start to lose your balance, hold on to the counter.  · Rest and repeat 5 times. Work up to holding for 20 to 30 seconds, if you can.    Increase flexibility  Being more flexible makes it easier for you to move around safely. Try exercises like the seated hamstring stretch.  · Sit in a chair and put one foot on a stool.  · Straighten your leg and reach with both hands down either side of your leg. Reach as far down your leg as you can.  · Hold for about 20 seconds.  · Go back to the starting position. Then repeat 5 times. Switch legs.    Build strength  "Resistance" exercises help build strength. You can do them without equipment. Or you can use weights, elastic bands, or special machines. One such exercise is called the biceps curl. You can hold a 1-pound weight or even a can of soup. Do this exercise at least 3 times a week. Strive for every day.  · Sit up straight in a chair.  · Keep your elbow close to your body and your wrist straight.  · Bend your arm, moving your hand up to your shoulder. Then slowly lower your arm.  · Repeat 5 times. Switch to the other arm.    Build your staying power  Aerobic exercises make your heart and lungs stronger so you can keep moving longer. Walking and swimming are two of the best types of exercises you can do. " Using a stationary bike is great, too. Find an aerobic exercise that you enjoy. Start slowly and build up. Even 5 minutes is helpful. Aim for a goal of 30 minutes, at least 3 times a week. You don't have to do 30 minutes in 1 session. Break it up and walk a little throughout the day.     More helpful tips  · Start easy. Slowly work up to doing more.  · Talk with your health care provider about the best exercises for you.  · Call senior centers or health clubs about exercise programs.  · If needed, have a family member watch you walk every so often to check your stability.  · Exercise with a friend. Choose an activity you both enjoy.  · Consider matty chi or yoga to strengthen your balance.  · Try exercises that you can do anytime, anywhere. Here are 2 examples. Have someone with you when you first try these:  ¨ Practice walking by placing 1 foot right in front of the other.  ¨ Stand up and sit down 10 times. Repeat this throughout the day.   Date Last Reviewed: 6/13/2015 © 2000-2016 [employer]. [address] [place] [address]. All rights reserved. This information is not intended as a substitute for professional medical care. Always follow your healthcare professional's instructions.          Arthralgia    Arthralgia is the term for pain in or around the joint. It is a symptom, not a disease. This pain may involve one or more joints. In some cases, the pain moves from joint to joint.  There are many causes for joint pain. These include:  · Injury  · Osteoarthritis (wearing out of the joint surface)  · Gout (inflammation of the joint due to crystals in the joint fluid)  · Infection inside the joint    · Bursitis (inflammation of the fluid-filled sacs around the joint)  · Autoimmune disorders such as rheumatoid arthritis or lupus  · Tendonitis (inflamation of chords that attach muscle to bone)  Home care  · Rest the involved joint(s) until your symptoms improve.   · You may be prescribed pain  medication. If none is prescribed, you may use acetaminophen or ibuprofen to control pain and inflammation.  Follow up  Follow up with your healthcare provider or our staff as advised.  When to seek medical care  Contact your healthcare provider right away if any of the following occurs:  · Pain, swelling, or redness of joint increases  · Pain worsens or recurs after a period of improvement  · Pain moves to other joints  · You cannot bear weight on the affected joint   · You cannot move the affected joint  · Joint appears deformed  · New rash appears  · Fever of 101ºF (38.8ºC) or higher, or as directed by your healthcare provider  Date Last Reviewed: 4/26/2015 © 2000-2016 [employer]. [address] [place] [address], [place] [address]. All rights reserved. This information is not intended as a substitute for professional medical care. Always follow your healthcare professional's instructions.          Neck Pain    There are several possible causes of neck pain when there is no injury:  · You can get a minor ligament sprain or muscle strain from a sudden minor neck movement. Sleeping with your neck in an awkward position can also cause this.  · Some people respond to emotional stress by tensing the muscles of their neck, shoulders, and upper back. Chronic spasm in these muscles can cause neck pain and sometimes headaches.  · Gradual wear and tear of the joints in the spine can cause degenerative arthritis. This can be a source of occasional or chronic neck pain.  · The spinal disks may bulge and put pressure on a nearby spinal nerve. This can happen as a natural result of aging or repeated small injuries to the neck. The spinal disks are the cushions between each spinal bone. This causes tingling, pain, or numbness that spreads from the neck to the shoulder, arm, or hand on one side.  Acute neck pain usually gets better in 1 to 2 weeks. Neck pain related to disk disease, arthritis in the spinal joints, or  spinal stenosis can become chronic and last for months or years. Spinal stenosis is narrowing of the spinal canal.  X-rays are usually not ordered for the initial evaluation of neck pain. However, X-rays may be done if you had a forceful physical injury, such as a car accident or fall. If pain continues and doesnt respond to medical treatment, X-rays and other tests may be done at a later time.  Home care  · Rest and relax the muscles. Use a comfortable pillow that supports the head. It should also help keep the spine in a neutral position. The position of the head should not be tilted forward or backward. A rolled up towel may help for a custom fit.  · Some people find relief with heat. Heat can be applied with either a warm shower or bath or a moist towel heated in the microwave and massage. Others prefer cold packs. You can make an ice pack by filling a plastic bag that seals at the top with ice cubes or crushed ice and then wrapping it with a thin towel. Try both and use the method that feels best for 15 to 20 minutes, several times a day.  · Whether using ice or heat, be careful that you do not injure your skin. Never put ice directly on the skin. Always wrap the ice in a towel or other type of cloth.This is very important, especially in people with poor skin sensations.   · Try to reduce your stress level. Emotional stress can lead to neck muscle tension and get in the way of or delay the healing process.  · You may use over-the-counter pain medicine to control pain, unless another medicine was prescribed. If you have chronic liver or kidney disease or ever had a stomach ulcer or GI bleeding, talk with your healthcare provider before using these medicines.  Follow-up care  Follow up with your healthcare provider if your symptoms do not show signs of improvement after one week. Physical therapy or further tests may be needed.  If X-rays, CT scans, or MRI scans were taken, you will be told of any new findings  that may affect your care.  Call 911  Call 911 if you have:  · Sudden weakness or numbness in one or both arms  · Neck swelling, difficulty or painful swallowing  · Difficulty breathing  · Chest pain  When to seek medical advice  Call your healthcare provider right away if any of these occur:  · Pain becomes worse or spreads into one or both arm  · Increasing headache  · Fever of 100.4°F (38°C) or above lasting for 24 to 48 hours  Date Last Reviewed: 7/1/2016 © 2000-2016 "[employer] [address] [place] [address]. All rights reserved. This information is not intended as a substitute for professional medical care. Always follow your healthcare professional's instructions.          The Shoulder Joint     The shoulder is made up of bones, muscles, ligaments, and tendons. They work together so you can reach, swing, and lift in comfort. Learning about the parts of the shoulder joint will help you to understand your shoulder problem.  The parts of the joint  The shoulder joint is where the humerus (upper arm bone) meets the scapula (shoulder blade):  · Muscles and ligaments help make up the joint. They attach to the shoulder blade and upper arm bone.  · At the top of the shoulder blade are two bony knobs called the acromion and coracoid process.  · The subacromial space is between the top of the humerus and the acromion. This space is filled with tendons, muscles, and the subacromial bursa.  · The bursa is a sac of fluid that cushions shoulder parts as they move.     The supraspinatus muscle and tendon are located in the subacromial space. They help form the rotator cuff and are commonly injured in a rotator cuff tear.  Date Last Reviewed: 10/12/2015  © 2011-8941 "[employer]". [address] [place] [address]. All rights reserved. This information is not intended as a substitute for professional medical care. Always follow your healthcare professional's  instructions.

## 2017-05-15 ENCOUNTER — TELEPHONE (OUTPATIENT)
Dept: INTERNAL MEDICINE | Facility: CLINIC | Age: 62
End: 2017-05-15

## 2017-05-15 ENCOUNTER — TELEPHONE (OUTPATIENT)
Dept: PAIN MEDICINE | Facility: CLINIC | Age: 62
End: 2017-05-15

## 2017-05-15 NOTE — TELEPHONE ENCOUNTER
----- Message from Laine Veliz sent at 5/15/2017  8:57 AM CDT -----  Contact: self/[phone]  Pt called in regards to she have not sleep in four or five days/shakes and jitters/ and would like to get a Rx.      Baldemar meehan  Please advise

## 2017-05-15 NOTE — TELEPHONE ENCOUNTER
----- Message from Satinder Lee sent at 5/12/2017  3:27 PM CDT -----  _  1st Request  _X  2nd Request  _  3rd Request        Who: JUDIE YO [[id number]]    Why: Pt would like to schedule an appt with Dr. Borges. Please call to schedule.    What Number to Call Back: [phone]    When to Expect a call back: (Before the end of the day)   -- if the call is after 12:00, the call back will be tomorrow.

## 2017-05-16 ENCOUNTER — OFFICE VISIT (OUTPATIENT)
Dept: PAIN MEDICINE | Facility: CLINIC | Age: 62
End: 2017-05-16
Payer: MEDICARE

## 2017-05-16 DIAGNOSIS — F11.20 OPIOID TYPE DEPENDENCE, CONTINUOUS USE: Primary | ICD-10-CM

## 2017-05-16 PROCEDURE — 99499 UNLISTED E&M SERVICE: CPT | Mod: S$GLB,,, | Performed by: PSYCHIATRY & NEUROLOGY

## 2017-05-16 PROCEDURE — 99213 OFFICE O/P EST LOW 20 MIN: CPT | Mod: S$GLB,,, | Performed by: PSYCHIATRY & NEUROLOGY

## 2017-05-16 PROCEDURE — 99999 PR PBB SHADOW E&M-EST. PATIENT-LVL III: CPT | Mod: PBBFAC,,, | Performed by: PSYCHIATRY & NEUROLOGY

## 2017-05-16 NOTE — TELEPHONE ENCOUNTER
Spoke to patient she states she still isn't sleeping and wants to discuss this with you at her appointment on Thursday. Patient advised to bring ALL medications.

## 2017-05-16 NOTE — MR AVS SNAPSHOT
[facility]  [address]  [place] [address]  Phone: [phone]  Fax: [phone]                  Tasha Hawley   2017 11:00 AM   Office Visit    Description:  Female : 1955   Provider:  Harmony Borges MD   Department:  [facility]           Reason for Visit     Follow-up                To Do List           Future Appointments        Provider Department Dept Phone    2017 8:40 AM MD [facility] Aranda [facility] - Urology 4th Floor [phone]    2017 9:40 AM MD Thierry Lundberg II [facility] - Internal Medicine [phone]    2017 2:00 PM [facility], DEXA1 [facility] [phone]    6/15/2017 2:00 PM Taurus Govea MD [facility] [phone]      Goals (5 Years of Data)              17    Patient/caregiver will have adequate mental health support/resources prior to discharge from [facility].       On track    Notes - Note created  3/16/2017  9:27 AM by Robert Delgado III, LCSW    Overall Time to Completion  2 months from 2017    Short Term Goals  Patient/caregiver will verbalize understanding of Depression and ways to effectively/safely manage depression within 1 month.  Interventions   · Collaborate with care team member as appropriate to meet patient needs.  · Collaborate with [facility] RN as appropriate to meet patient needs.  · Collaborate with physician as appropriate to meet patient needs  · Encourage communication with providers.  · Encourage compliance with medical/mental health appointments.  · Encourage family/social  and involvement in care.  · Provide crisis intervention hotline.  · Provide mental/behavioral health resource(s).  · Provide supportive counseling.  · Recognize and provide educational material (JAI).   Status  · Partially met            COMPLETED: Patient/caregiver will have an action plan in place to manage and prevent complications  of CHF prior to discharge from [facility].     No change      Notes - Note edited  5/12/2017  9:58 AM by Jackie Polk RN    Overall Time to Completion  1 month from 03/10/2017    Short Term Goals  Patient/caregiver will verbalize 2 signs and symptoms of Congestive Heart Failure within 2 weeks.   Interventions   · Recognize and provide educational material (JAI).  · Assess for availability of working scale in home setting.  · Mail Weight log for home use.   Status  · Met     Clinical Reference Documents Added to Patient Instructions       Document    FALL PREVENTION (ENGLISH)    HEART FAILURE, WHAT IS (ENGLISH)    HEART FAILURE: TRACKING YOUR WEIGHT (ENGLISH)    HEART FAILURE: WARNING SIGNS OF A FLARE-UP (ENGLISH)              Patient/caregiver will have an action plan in place to manage and prevent complications of pain prior to discharge from [facility].   On track  On track      Notes - Note edited  3/31/2017 12:54 PM by Mel Cantrell RN    Overall Time to Completion  1 month from 03/31/2017    Short Term Goals  Patient/caregiver will verbalize 2 noninvasive pain relief measures to help manage the pain within 2 weeks.  Interventions   · Recognize and provide educational material (JAI).   Status  · Partially met       Clinical Reference Documents Added to Patient Instructions       Document    BACK PAIN, RELIEVING (ENGLISH)    PAIN MANAGEMENT: CHRONIC (ENGLISH)    PAIN, THE CYCLE OF CHRONIC (ENGLISH)              COMPLETED: Patient/caregiver will have knowledge of resources available in order to obtain the services that are needed prior to discharge from [facility].           Notes - Note edited  3/31/2017 12:50 PM by Mel Cantrell RN    Overall Time to Completion  1 month from 03/10/2017    Short Term Goals  Patient/caregiver will notify RN CCM if patient does not hear from [facility]  and PAP within 2 weeks.  Interventions   · Refer to [facility] Assistance [facility].  · Refer to Outpatient Case  Management .   Status  · Met          Patient/caregiver will have knowledge of resources available in order to obtain the services that are needed prior to discharge from [facility].       On track    Notes - Note created  3/16/2017  9:21 AM by Robert Delgado III, LCSW    Overall Time to Completion  2 months from 03/16/2017    Short Term Goals  Patient/caregiver will identify 3 supports or services to maintain or improve current functional status within 1 month.  Interventions   · Collaborate with care team member as appropriate to meet patient needs.  · Collaborate with external provider as appropriate to meet patient needs.   · Collaborate with [facility] RN as appropriate to meet patient needs.  · Collaborate with physician as appropriate to meet patient needs  · Complete community search for available resources on Agency:  agency or Home Care Services or volunteer program.  · Discuss patient care needs and potential barriers.  · Encourage communication with providers.  · Encourage compliance with medical/mental health appointments.  · Encourage family/social  and involvement in care.  · Provide family support resource(s).  · Provide information on alternative levels of care.  · Provide information on home care services.   Status  · Partially met            Patient/caregiver will have Safety Plan in place prior to discharge from [facility].   On track  Worsening      Notes - Note edited  3/10/2017 10:27 AM by Mel Cantrell RN    Overall Time to Completion  1 month from 03/10/2017    Short Term Goals  Patient/caregiver will verbalize importance of having a safe home environment by making sure rooms are well lit and removing throw rugs within 2 weeks.  Interventions   · Recognize and provide educational material (JAI).   Status  · Partially met       Clinical Reference Documents Added to Patient Instructions       Document    FALL PREVENTION (ENGLISH)    HEART FAILURE, WHAT IS (ENGLISH)     HEART FAILURE: TRACKING YOUR WEIGHT (ENGLISH)    HEART FAILURE: WARNING SIGNS OF A FLARE-UP (ENGLISH)                [facility]     [facility] Nurse Care Line - 24/7 Assistance  Unless otherwise directed by your provider, please contact [facility], our nurse care line that is available for 24/7 assistance.     Registered nurses in the [facility] provide: appointment scheduling, clinical advisement, health education, and other advisory services.  Call: [phone] (toll free)               Medications           Message regarding Medications     Verify the changes and/or additions to your medication regime listed below are the same as discussed with your clinician today.  If any of these changes or additions are incorrect, please notify your healthcare provider.             Verify that the below list of medications is an accurate representation of the medications you are currently taking.  If none reported, the list may be blank. If incorrect, please contact your healthcare provider. Carry this list with you in case of emergency.           Current Medications     atorvastatin (LIPITOR) 80 MG tablet Take 1 tablet (80 mg total) by mouth once daily.    buPROPion (WELLBUTRIN XL) 150 MG TB24 tablet Take 2 tablets (300 mg total) by mouth once daily.    BUTALBITAL/ASPIRIN/CAFFEINE (FIORINAL ORAL) Take by mouth as needed.    DOC-Q-LACE 100 mg capsule TAKE ONE CAPSULE BY MOUTH THREE TIMES DAILY AS NEEDED FOR CONSTIPATION    doxycycline (MONODOX) 100 MG capsule     fluoxetine (PROZAC) 20 MG capsule Take 2 capsules (40 mg total) by mouth once daily.    hydrocodone-acetaminophen 10-325mg (NORCO)  mg Tab Take 1 tablet by mouth every 6 (six) hours as needed.    hyoscyamine (ANASPAZ,LEVSIN) 0.125 mg Tab TAKE 1 TABLET (125 MCG TOTAL) BY MOUTH EVERY 6 (SIX) HOURS AS NEEDED.    levothyroxine (SYNTHROID) 100 MCG tablet Take 1 tablet (100 mcg total) by mouth before breakfast.    lidocaine-prilocaine  (EMLA) cream     ondansetron (ZOFRAN) 8 MG tablet Take 1 tablet (8 mg total) by mouth every 12 (twelve) hours as needed for Nausea.    oxybutynin (DITROPAN XL) 15 MG TR24     pantoprazole (PROTONIX) 40 MG tablet Take 1 tablet (40 mg total) by mouth once daily.    polyethylene glycol (GLYCOLAX) 17 gram PwPk Take 17 g by mouth 2 (two) times daily.    SENNOSIDES (SENNA LAX ORAL) Take by mouth as needed.    trazodone (DESYREL) 50 MG tablet Take 3 tablets (150 mg total) by mouth every evening.    VOLTAREN 1 % Gel     aspirin (ECOTRIN) 81 MG EC tablet Take 1 tablet (81 mg total) by mouth once daily.           Clinical Reference Information           Your Vitals Were     Last Period                   (LMP Unknown)           Allergies as of 5/16/2017     Imitrex [Sumatriptan Succinate]    Penicillins    Percocet [Oxycodone-acetaminophen]    Topamax [Topiramate]    Vancomycin    (D)-limonene Flavor    Bactrim [Sulfamethoxazole-trimethoprim]    Butorphanol Tartrate    Darvocet A500 [Propoxyphene N-acetaminophen]    Fentanyl    Phenytoin Sodium Extended    White Petrolatum-zinc    Zinc Oxide-white Petrolatum    Latex, Natural Rubber      Immunizations Administered on Date of Encounter - 5/16/2017     None      Language Assistance Services     ATTENTION: Language assistance services are available, free of charge. Please call [phone].      ATENCIÓN: Si parteek wood, tiene a morgan disposición servicios gratuitos de asistencia lingüística. Llame al [phone].     CHÚ Ý: N?u b?n nói Ti?ng Vi?t, có các d?ch v? h? tr? ngôn ng? mi?n phí dành cho b?n. G?i s? [phone].         [facility] complies with applicable Federal civil rights laws and does not discriminate on the basis of race, color, national origin, age, disability, or sex.

## 2017-05-16 NOTE — PROGRESS NOTES
Outpatient Psychiatry Follow-Up Visit (MD/NP)    5/16/2017    Clinical Status of Patient:  Outpatient (Ambulatory)    Chief Complaint:  Tasha Hawley is a 61 y.o. female who presents today for follow-up of opioid dependence.  Met with patient and spouse.      Interval History and Content of Current Session:  Interim Events/Subjective Report/Content of Current Session: Pt reports that she was sent back to see me, not sure why. She was nore alert today. Her  reports that she is still managing her medications on her own. I did review the [place]  with her and there were some inconsistencies noted. I have discussed this with her at her last appointment. She has an appointment with Dr Hodges and will take all the medications that she has with her to that appointment. I did discuss with pt and her  that my concern is that she is taking her medications more than she is prescribed, not sure why. I have encouraged her  to dispense the medicaions for her for at the least a month so this can be assessed.     Psychotherapy:  · Target symptoms: pain, falls, medications  · Why chosen therapy is appropriate versus another modality: relevant to diagnosis, patient responds to this modality  · Outcome monitoring methods: self-report, observation, feedback from family, checklist/rating scale  · Therapeutic intervention type: behavior modifying psychotherapy, supportive psychotherapy  · Topics discussed/themes: stress related to medical comorbidities, difficulty managing affect in interpersonal relationships, building skills sets for symptom management  · The patient's response to the intervention is accepting. The patient's progress toward treatment goals is not progressing.   · Duration of intervention: 30 minutes.    Review of Systems   · PSYCHIATRIC: Pertinant items are noted in the narrative.    Past Medical, Family and Social History: The patient's past medical, family and social history have been reviewed  and updated as appropriate within the electronic medical record - see encounter notes.    Compliance: no    Side effects: None    Risk Parameters:  Patient reports no suicidal ideation  Patient reports no homicidal ideation  Patient reports no self-injurious behavior  Patient reports no violent behavior    Exam (detailed: at least 9 elements; comprehensive: all 15 elements)   Constitutional  Vitals:  Most recent vital signs, dated less than 90 days prior to this appointment, were reviewed.   There were no vitals filed for this visit.     General:  age appropriate, casually dressed, neatly groomed     Musculoskeletal  Muscle Strength/Tone:  no tremor, no tic   Gait & Station:  in wheelchair     Psychiatric  Speech:  no latency; no press   Mood & Affect:  Ok, more lert today  congruent and appropriate   Thought Process:  normal and logical, goal-directed   Associations:  intact   Thought Content:  normal, no suicidality, no homicidality, delusions, or paranoia   Insight:  intact   Judgement: behavior is adequate to circumstances   Orientation:  grossly intact   Memory: intact for content of interview   Language: grossly intact   Attention Span & Concentration:  able to focus   Fund of Knowledge:  intact and appropriate to age and level of education     Assessment and Diagnosis   Status/Progress: Based on the examination today, the patient's problem(s) is/are inadequately controlled.  New problems have been presented today.   Co-morbidities are complicating management of the primary condition.  There are no active rule-out diagnoses for this patient at this time.       Impression:61 y.o. female with a PMHx of chronic neck and back pain, with a dilaudid pain pump, s/p suprapubic catheter, GERD, hx CVA, possible MI, on ASA/Plavix, anxiety, RA, HLD, and hypothyroidism,  leg pain with        Opioid dependence in a controlled environment R/O opioid use disorder      H/O MDD.  Unspecified anxiety disorder         Strengths  "and Liabilities: Strength: Patient has reasonable judgment., Liability: Patient has poor health., Liability: Patient lacks coping skills.     Treatment Goals: Specify outcomes written in observable, behavioral terms:   Anxiety: acquiring relapse prevention skills and reducing time spent worrying (<30 minutes/day)  Depression: eliminating all depressive symptoms (BDI score <10 for 1 month)     Treatment Plan/Recommendations:   · Medication Management: The risks and benefits of medication were discussed with the patient.  · Referral for further treatment to social work team for psychotherapy  · The treatment plan and follow up plan were reviewed with the patient.  I have discussed with the pt that she might be making mistake with her medication .   I have dicussed with her that she will need to discuss this Dr Hillman.  She is already seeing Dr [facility] and so will continue to see him .  I have discussed with her safety concerns with opioids.  Have discussed with pt"s  that I am encouraging him to assit her with her medications and make a log of everthing that she is taking everyday .  Discussed concerns with inconsistencies in [place]                  Return to Clinic: pt will F/U with Dr Oconnor.    "

## 2017-05-17 ENCOUNTER — TELEPHONE (OUTPATIENT)
Dept: UROLOGY | Facility: CLINIC | Age: 62
End: 2017-05-17

## 2017-05-17 NOTE — TELEPHONE ENCOUNTER
----- Message from Susannah Whittaker RN sent at 5/15/2017 12:03 PM CDT -----  Contact: [phone]      ----- Message -----     From: Karoline Land MA     Sent: 5/15/2017  10:08 AM       To: [facility] Staff    [phone]  Pt states that she has an S/P tube and that she has another yeast infection

## 2017-05-17 NOTE — TELEPHONE ENCOUNTER
Patient was given an appointment for today and she cancelled the appointment this morning (same day as the appointment.)

## 2017-05-18 ENCOUNTER — OFFICE VISIT (OUTPATIENT)
Dept: INTERNAL MEDICINE | Facility: CLINIC | Age: 62
End: 2017-05-18
Payer: MEDICARE

## 2017-05-18 VITALS
BODY MASS INDEX: 29.2 KG/M2 | SYSTOLIC BLOOD PRESSURE: 124 MMHG | WEIGHT: 171.06 LBS | HEART RATE: 56 BPM | HEIGHT: 64 IN | TEMPERATURE: 98 F | DIASTOLIC BLOOD PRESSURE: 80 MMHG

## 2017-05-18 DIAGNOSIS — F11.20 NARCOTIC DEPENDENCY, CONTINUOUS: ICD-10-CM

## 2017-05-18 DIAGNOSIS — G89.4 CHRONIC PAIN SYNDROME: ICD-10-CM

## 2017-05-18 DIAGNOSIS — R11.0 NAUSEA: ICD-10-CM

## 2017-05-18 DIAGNOSIS — I89.0 LYMPHEDEMA OF BOTH LOWER EXTREMITIES: ICD-10-CM

## 2017-05-18 DIAGNOSIS — E03.9 PRIMARY HYPOTHYROIDISM: Primary | ICD-10-CM

## 2017-05-18 DIAGNOSIS — R29.6 FREQUENT FALLS: ICD-10-CM

## 2017-05-18 PROCEDURE — 99499 UNLISTED E&M SERVICE: CPT | Mod: S$GLB,,, | Performed by: INTERNAL MEDICINE

## 2017-05-18 PROCEDURE — 99999 PR PBB SHADOW E&M-EST. PATIENT-LVL III: CPT | Mod: PBBFAC,,, | Performed by: INTERNAL MEDICINE

## 2017-05-18 PROCEDURE — 1160F RVW MEDS BY RX/DR IN RCRD: CPT | Mod: S$GLB,,, | Performed by: INTERNAL MEDICINE

## 2017-05-18 PROCEDURE — 99214 OFFICE O/P EST MOD 30 MIN: CPT | Mod: S$GLB,,, | Performed by: INTERNAL MEDICINE

## 2017-05-18 RX ORDER — LEVOTHYROXINE SODIUM 112 UG/1
112 TABLET ORAL
Qty: 90 TABLET | Refills: 3 | Status: SHIPPED | OUTPATIENT
Start: 2017-05-18 | End: 2018-02-01 | Stop reason: SDUPTHER

## 2017-05-18 RX ORDER — ACETAMINOPHEN 500 MG
500 TABLET ORAL EVERY 6 HOURS PRN
COMMUNITY
End: 2017-08-21

## 2017-05-18 RX ORDER — ONDANSETRON HYDROCHLORIDE 8 MG/1
8 TABLET, FILM COATED ORAL EVERY 12 HOURS PRN
Qty: 60 TABLET | Refills: 3 | Status: ON HOLD | OUTPATIENT
Start: 2017-05-18 | End: 2017-12-28 | Stop reason: CLARIF

## 2017-05-18 NOTE — PROGRESS NOTES
"Subjective:       Patient ID: Tasha Hawley is a 61 y.o. female.    Chief Complaint: Insomnia (medication check)    HPI - Mrs. Hawley feels as well today as she has in several months.  Describes her mood as "bright".  She finally brought in all her medications for reconciliation - there were multiple duplicates and several not on the list.  We spent most of the visit reconciling and discussing how to take medications.   was present to help and also learn.    She fell at home a couple of weeks ago and had an ER visit - no fractures or lacerations noted.  This is a fairly common occurrence for her; advised more consistent use of wheelchair/walker.  She needs refill on zofran.  Chronic 4+ edema to legs seems "better", according to her.  She's still taking her narcotics.  She has more temper and impatience when not taking alprazolam, so she wants to stay on that bid.    She's a nonsmoker, non drinker.  Family history reviewed; noncontributory    PMH:  Insomnia, improving.  Going to bed most nights at MN and sleeping until 5:30-7 am.  Not always requiring trazodone  Chronic low back pain with narcotic use.  Indwelling narcotic pump  History CVA with dysarthria  Neurogenic bladder, with recurrent UTI's.  SP catheter placed Nov 2016  Hypothyroidism - last tsh >20  CECI, not on CPAP  CAD, with ACS in Jan 2016 - subtot mid LAD lesion without PCI; ischemic CM with EF 40% and chronic LE edema. Followed by [facility]  Anxiety and Depression  Chronic constipation, likely d/t ongoing narcotic use  Intermittent nausea     Meds: Reviewed and reconciled in EPIC with patient during visit today.    Review of Systems   Constitutional: Negative for fever.   HENT: Negative for congestion.    Respiratory: Negative for shortness of breath.    Cardiovascular: Positive for leg swelling. Negative for chest pain.   Gastrointestinal: Negative for abdominal pain.   Genitourinary: Negative for difficulty urinating. "   Musculoskeletal: Positive for arthralgias.   Skin: Negative for rash.   Neurological: Negative for headaches.   Psychiatric/Behavioral: Positive for sleep disturbance (but improving). The patient is nervous/anxious.        Objective:      Physical Exam   Constitutional: She appears well-developed and well-nourished. No distress.   HENT:   Head: Normocephalic and atraumatic.   Musculoskeletal: She exhibits edema.   Neurological: She is alert.   Skin: She is not diaphoretic.   Nursing note and vitals reviewed.      Assessment:       1. Primary hypothyroidism    2. Chronic pain syndrome    3. Nausea    4. Frequent falls    5. Lymphedema of both lower extremities    6. Narcotic dependency, continuous        Plan:       Tasha was seen today for insomnia.    Diagnoses and all orders for this visit:    Primary hypothyroidism - 100mcg isn't enough; not at goal.  Increase to 112  -     levothyroxine (SYNTHROID) 112 MCG tablet; Take 1 tablet (112 mcg total) by mouth before breakfast.    Chronic pain syndrome - stable, followed by outside pain management    Nausea - intermittent, stable.  Refilling zofran  -     ondansetron (ZOFRAN) 8 MG tablet; Take 1 tablet (8 mg total) by mouth every 12 (twelve) hours as needed for Nausea.    Frequent falls - chronic problem, stable.  Use w/c and walker more consistently    Lymphedema of both lower extremities - stable    Narcotic dependency, continuous    rtc 3 months.    PAPO Hodges MD MPH  Staff Internist

## 2017-05-18 NOTE — MR AVS SNAPSHOT
[facility]  [address]  [facility] [address]  Phone: [phone]  Fax: [phone]                  Tasha Hawley   2017 9:40 AM   Office Visit    Description:  Female : 1955   Provider:  Mesfin Hodges II, MD   Department:  [facility]           Reason for Visit     Insomnia           Diagnoses this Visit        Comments    Primary hypothyroidism    -  Primary     Chronic pain syndrome         Nausea         Frequent falls         Lymphedema of both lower extremities         Narcotic dependency, continuous                To Do List           Future Appointments        Provider Department Dept Phone    2017 2:00 PM [facility] [phone]    2017 1:20 PM Elida Batista NP [facility] [place] [phone]    6/15/2017 2:00 PM Taurus Govea MD [facility] [phone]      Goals (5 Years of Data)              17    Patient/caregiver will have adequate mental health support/resources prior to discharge from [facility].       On track    Notes - Note created  3/16/2017  9:27 AM by Robert Delgado III, LCSW    Overall Time to Completion  2 months from 2017    Short Term Goals  Patient/caregiver will verbalize understanding of Depression and ways to effectively/safely manage depression within 1 month.  Interventions   · Collaborate with care team member as appropriate to meet patient needs.  · Collaborate with [facility] RN as appropriate to meet patient needs.  · Collaborate with physician as appropriate to meet patient needs  · Encourage communication with providers.  · Encourage compliance with medical/mental health appointments.  · Encourage family/social  and involvement in care.  · Provide crisis intervention hotline.  · Provide mental/behavioral health resource(s).  · Provide supportive counseling.  · Recognize and provide educational  material (JAI).   Status  · Partially met            COMPLETED: Patient/caregiver will have an action plan in place to manage and prevent complications of CHF prior to discharge from [facility].     No change      Notes - Note edited  5/12/2017  9:58 AM by Jackie Polk RN    Overall Time to Completion  1 month from 03/10/2017    Short Term Goals  Patient/caregiver will verbalize 2 signs and symptoms of Congestive Heart Failure within 2 weeks.   Interventions   · Recognize and provide educational material (JAI).  · Assess for availability of working scale in home setting.  · Mail Weight log for home use.   Status  · Met     Clinical Reference Documents Added to Patient Instructions       Document    FALL PREVENTION (ENGLISH)    HEART FAILURE, WHAT IS (ENGLISH)    HEART FAILURE: TRACKING YOUR WEIGHT (ENGLISH)    HEART FAILURE: WARNING SIGNS OF A FLARE-UP (ENGLISH)              Patient/caregiver will have an action plan in place to manage and prevent complications of pain prior to discharge from [facility].   On track  On track      Notes - Note edited  3/31/2017 12:54 PM by Mel Cantrell RN    Overall Time to Completion  1 month from 03/31/2017    Short Term Goals  Patient/caregiver will verbalize 2 noninvasive pain relief measures to help manage the pain within 2 weeks.  Interventions   · Recognize and provide educational material (JAI).   Status  · Partially met       Clinical Reference Documents Added to Patient Instructions       Document    BACK PAIN, RELIEVING (ENGLISH)    PAIN MANAGEMENT: CHRONIC (ENGLISH)    PAIN, THE CYCLE OF CHRONIC (ENGLISH)              COMPLETED: Patient/caregiver will have knowledge of resources available in order to obtain the services that are needed prior to discharge from [facility].           Notes - Note edited  3/31/2017 12:50 PM by Mel Cantrell RN    Overall Time to Completion  1 month from 03/10/2017    Short Term Goals  Patient/caregiver will notify RN CCM if patient does not  hear from [facility]  and PAP within 2 weeks.  Interventions   · Refer to [facility].  · Refer to Outpatient Case Management Social Worker.   Status  · Met          Patient/caregiver will have knowledge of resources available in order to obtain the services that are needed prior to discharge from [facility].       On track    Notes - Note created  3/16/2017  9:21 AM by Robert Delgado III, LCSW    Overall Time to Completion  2 months from 03/16/2017    Short Term Goals  Patient/caregiver will identify 3 supports or services to maintain or improve current functional status within 1 month.  Interventions   · Collaborate with care team member as appropriate to meet patient needs.  · Collaborate with external provider as appropriate to meet patient needs.   · Collaborate with [facility] RN as appropriate to meet patient needs.  · Collaborate with physician as appropriate to meet patient needs  · Complete community search for available resources on Agency:  agency or Home Care Services or volunteer program.  · Discuss patient care needs and potential barriers.  · Encourage communication with providers.  · Encourage compliance with medical/mental health appointments.  · Encourage family/social  and involvement in care.  · Provide family support resource(s).  · Provide information on alternative levels of care.  · Provide information on home care services.   Status  · Partially met            Patient/caregiver will have Safety Plan in place prior to discharge from [facility].   On track  Worsening      Notes - Note edited  3/10/2017 10:27 AM by Mel Cantrell RN    Overall Time to Completion  1 month from 03/10/2017    Short Term Goals  Patient/caregiver will verbalize importance of having a safe home environment by making sure rooms are well lit and removing throw rugs within 2 weeks.  Interventions   · Recognize and provide educational material (JAI).   Status  · Partially  met       Clinical Reference Documents Added to Patient Instructions       Document    FALL PREVENTION (ENGLISH)    HEART FAILURE, WHAT IS (ENGLISH)    HEART FAILURE: TRACKING YOUR WEIGHT (ENGLISH)    HEART FAILURE: WARNING SIGNS OF A FLARE-UP (ENGLISH)                Follow-Up and Disposition     Return in about 3 months (around 8/18/2017).       These Medications        Disp Refills Start End    levothyroxine (SYNTHROID) 112 MCG tablet 90 tablet 3 5/18/2017     Take 1 tablet (112 mcg total) by mouth before breakfast. - Oral    Pharmacy: [id number] [place], [place] - [address] #: [phone]       ondansetron (ZOFRAN) 8 MG tablet 60 tablet 3 5/18/2017     Take 1 tablet (8 mg total) by mouth every 12 (twelve) hours as needed for Nausea. - Oral    Pharmacy: [id number] [place], [place] - [address] Ph #: [phone]         [facility]     [facility] Nurse Care Line - 24/7 Assistance  Unless otherwise directed by your provider, please contact [facility], our nurse care line that is available for 24/7 assistance.     Registered nurses in the [facility] provide: appointment scheduling, clinical advisement, health education, and other advisory services.  Call: [phone] (toll free)               Medications           Message regarding Medications     Verify the changes and/or additions to your medication regime listed below are the same as discussed with your clinician today.  If any of these changes or additions are incorrect, please notify your healthcare provider.        CHANGE how you are taking these medications     Start Taking Instead of    levothyroxine (SYNTHROID) 112 MCG tablet levothyroxine (SYNTHROID) 100 MCG tablet    Dosage:  Take 1 tablet (112 mcg total) by mouth before breakfast. Dosage:  Take 1 tablet (100 mcg total) by mouth before breakfast.    Reason for Change:  Reorder       STOP taking these medications     doxycycline  (MONODOX) 100 MG capsule     buPROPion (WELLBUTRIN XL) 150 MG TB24 tablet Take 2 tablets (300 mg total) by mouth once daily.           Verify that the below list of medications is an accurate representation of the medications you are currently taking.  If none reported, the list may be blank. If incorrect, please contact your healthcare provider. Carry this list with you in case of emergency.           Current Medications     acetaminophen (TYLENOL) 500 MG tablet Take 500 mg by mouth every 6 (six) hours as needed for Pain.    aspirin (ECOTRIN) 81 MG EC tablet Take 1 tablet (81 mg total) by mouth once daily.    atorvastatin (LIPITOR) 80 MG tablet Take 1 tablet (80 mg total) by mouth once daily.    BUTALBITAL/ASPIRIN/CAFFEINE (FIORINAL ORAL) Take by mouth as needed.    DOC-Q-LACE 100 mg capsule TAKE ONE CAPSULE BY MOUTH THREE TIMES DAILY AS NEEDED FOR CONSTIPATION    fluoxetine (PROZAC) 20 MG capsule Take 2 capsules (40 mg total) by mouth once daily.    hydrocodone-acetaminophen 10-325mg (NORCO)  mg Tab Take 1 tablet by mouth every 6 (six) hours as needed.    hyoscyamine (ANASPAZ,LEVSIN) 0.125 mg Tab TAKE 1 TABLET (125 MCG TOTAL) BY MOUTH EVERY 6 (SIX) HOURS AS NEEDED.    levothyroxine (SYNTHROID) 112 MCG tablet Take 1 tablet (112 mcg total) by mouth before breakfast.    lidocaine-prilocaine (EMLA) cream     ondansetron (ZOFRAN) 8 MG tablet Take 1 tablet (8 mg total) by mouth every 12 (twelve) hours as needed for Nausea.    oxybutynin (DITROPAN XL) 15 MG TR24     pantoprazole (PROTONIX) 40 MG tablet Take 1 tablet (40 mg total) by mouth once daily.    polyethylene glycol (GLYCOLAX) 17 gram PwPk Take 17 g by mouth 2 (two) times daily.    ranitidine (ZANTAC) 150 MG capsule Take 150 mg by mouth 2 (two) times daily.    SENNOSIDES (SENNA LAX ORAL) Take by mouth as needed.    trazodone (DESYREL) 50 MG tablet Take 3 tablets (150 mg total) by mouth every evening.    VOLTAREN 1 % Gel            Clinical Reference  "Information           Your Vitals Were     BP Pulse Temp Height Weight Last Period    124/80 (BP Location: Left arm, Patient Position: Sitting, BP Method: Manual) 56 97.6 °F (36.4 °C) (Oral) 5' 4" (1.626 m) 77.6 kg (171 lb 1.2 oz) (LMP Unknown)    BMI                29.37 kg/m2          Blood Pressure          Most Recent Value    BP  124/80      Allergies as of 5/18/2017     Imitrex [Sumatriptan Succinate]    Penicillins    Percocet [Oxycodone-acetaminophen]    Topamax [Topiramate]    Vancomycin    (D)-limonene Flavor    Bactrim [Sulfamethoxazole-trimethoprim]    Butorphanol Tartrate    Darvocet A500 [Propoxyphene N-acetaminophen]    Fentanyl    Phenytoin Sodium Extended    White Petrolatum-zinc    Zinc Oxide-white Petrolatum    Latex, Natural Rubber      Immunizations Administered on Date of Encounter - 5/18/2017     None      Language Assistance Services     ATTENTION: Language assistance services are available, free of charge. Please call [phone].      ATENCIÓN: Si habla español, tiene a morgan disposición servicios gratuitos de asistencia lingüística. Llame al [phone].     VERONICA Ý: N?u b?n nói Ti?ng Vi?t, có các d?ch v? h? tr? ngôn ng? mi?n phí dành cho b?n. G?i s? [phone].         [facility] complies with applicable Federal civil rights laws and does not discriminate on the basis of race, color, national origin, age, disability, or sex.        "

## 2017-05-20 ENCOUNTER — HOSPITAL ENCOUNTER (EMERGENCY)
Facility: HOSPITAL | Age: 62
Discharge: HOME | End: 2017-05-20
Attending: EMERGENCY MEDICINE
Payer: MEDICARE

## 2017-05-20 VITALS
BODY MASS INDEX: 25.95 KG/M2 | TEMPERATURE: 98 F | SYSTOLIC BLOOD PRESSURE: 120 MMHG | HEART RATE: 92 BPM | OXYGEN SATURATION: 98 % | RESPIRATION RATE: 20 BRPM | WEIGHT: 152 LBS | HEIGHT: 64 IN | DIASTOLIC BLOOD PRESSURE: 57 MMHG

## 2017-05-20 DIAGNOSIS — R51.9 SINUS HEADACHE: ICD-10-CM

## 2017-05-20 DIAGNOSIS — J01.90 ACUTE SINUSITIS, RECURRENCE NOT SPECIFIED, UNSPECIFIED LOCATION: Primary | ICD-10-CM

## 2017-05-20 DIAGNOSIS — S40.022A: ICD-10-CM

## 2017-05-20 DIAGNOSIS — M25.512 LEFT SHOULDER PAIN, UNSPECIFIED CHRONICITY: ICD-10-CM

## 2017-05-20 DIAGNOSIS — M79.603 ARM PAIN: ICD-10-CM

## 2017-05-20 PROCEDURE — 93010 ELECTROCARDIOGRAM REPORT: CPT | Mod: ,,, | Performed by: INTERNAL MEDICINE

## 2017-05-20 PROCEDURE — 63600175 PHARM REV CODE 636 W HCPCS: Performed by: NURSE PRACTITIONER

## 2017-05-20 PROCEDURE — 96372 THER/PROPH/DIAG INJ SC/IM: CPT

## 2017-05-20 PROCEDURE — 99284 EMERGENCY DEPT VISIT MOD MDM: CPT | Mod: 25

## 2017-05-20 PROCEDURE — 93005 ELECTROCARDIOGRAM TRACING: CPT

## 2017-05-20 PROCEDURE — 25000003 PHARM REV CODE 250: Performed by: NURSE PRACTITIONER

## 2017-05-20 RX ORDER — HYDROCODONE BITARTRATE AND ACETAMINOPHEN 10; 325 MG/1; MG/1
1 TABLET ORAL
Status: COMPLETED | OUTPATIENT
Start: 2017-05-20 | End: 2017-05-20

## 2017-05-20 RX ORDER — BETAMETHASONE SODIUM PHOSPHATE AND BETAMETHASONE ACETATE 3; 3 MG/ML; MG/ML
9 INJECTION, SUSPENSION INTRA-ARTICULAR; INTRALESIONAL; INTRAMUSCULAR; SOFT TISSUE
Status: COMPLETED | OUTPATIENT
Start: 2017-05-20 | End: 2017-05-20

## 2017-05-20 RX ORDER — BENZONATATE 100 MG/1
100 CAPSULE ORAL 3 TIMES DAILY PRN
Qty: 20 CAPSULE | Refills: 0 | Status: SHIPPED | OUTPATIENT
Start: 2017-05-20 | End: 2017-05-30

## 2017-05-20 RX ORDER — FLUTICASONE PROPIONATE 50 MCG
1 SPRAY, SUSPENSION (ML) NASAL 2 TIMES DAILY PRN
Qty: 15 G | Refills: 0 | Status: SHIPPED | OUTPATIENT
Start: 2017-05-20 | End: 2017-06-16 | Stop reason: SDUPTHER

## 2017-05-20 RX ORDER — AZITHROMYCIN 250 MG/1
250 TABLET, FILM COATED ORAL DAILY
Qty: 6 TABLET | Refills: 0 | Status: SHIPPED | OUTPATIENT
Start: 2017-05-20 | End: 2017-08-21

## 2017-05-20 RX ORDER — ONDANSETRON 4 MG/1
4 TABLET, ORALLY DISINTEGRATING ORAL
Status: COMPLETED | OUTPATIENT
Start: 2017-05-20 | End: 2017-05-20

## 2017-05-20 RX ADMIN — HYDROCODONE BITARTRATE AND ACETAMINOPHEN 1 TABLET: 10; 325 TABLET ORAL at 08:05

## 2017-05-20 RX ADMIN — ONDANSETRON 4 MG: 4 TABLET, ORALLY DISINTEGRATING ORAL at 08:05

## 2017-05-20 RX ADMIN — BETAMETHASONE SODIUM PHOSPHATE AND BETAMETHASONE ACETATE 9 MG: 3; 3 INJECTION, SUSPENSION INTRA-ARTICULAR; INTRALESIONAL; INTRAMUSCULAR at 08:05

## 2017-05-20 NOTE — ED AVS SNAPSHOT
[facility]  [address]  [place] [address]               Tasha Hawley   2017  6:46 PM   ED    Description:  Female : 1955   Department:  [facility]           Your Care was Coordinated By:     Provider Role From To    Luisito Diaz MD Attending Provider 17 --    Gwen Delgado NP Nurse Practitioner 17 --      Reason for Visit     Arm Pain           Diagnoses this Visit        Comments    Acute sinusitis, recurrence not specified, unspecified location    -  Primary     Arm pain         Left shoulder pain, unspecified chronicity         Contusion, upper extremity, left, initial encounter         Sinus headache           ED Disposition     ED Disposition Condition Comment    Discharge             To Do List           Follow-up Information     Follow up with Mesfin Hodges Ii, MD In 1 week.    Specialty:  Internal Medicine    Contact information:    [address]  [facility] [address]  [phone]          Follow up with [facility].    Specialty:  Emergency Medicine    Why:  As needed, If symptoms worsen    Contact information:    [address] [facility] [id number] [phone]       These Medications        Disp Refills Start End    azithromycin (Z-MILTON) 250 MG tablet 6 tablet 0 2017     Take 1 tablet (250 mg total) by mouth once daily. Take first 2 tablets together, then 1 every day until finished. - Oral    Pharmacy: [id number] [place] [address] [address] [facility] Ph #: [phone]       fluticasone (FLONASE) 50 mcg/actuation nasal spray 15 g 0 2017 6/3/2017    1 spray by Each Nare route 2 (two) times daily as needed for Rhinitis. - Each Nare    Pharmacy: [facility] [address] [place] [address] [address] [facility] Ph #: [phone]       benzonatate (TESSALON) 100 MG capsule 20 capsule 0 2017    Take 1 capsule (100 mg total) by  mouth 3 (three) times daily as needed for Cough. - Oral    Pharmacy: [id number] - [facility] [address]  #: [phone]         [facility] On [facility]     [facility] On Call Nurse Care Line - 24 Assistance  Unless otherwise directed by your provider, please contact [facility], our nurse care line that is available for / assistance.     Registered nurses in the [facility] provide: appointment scheduling, clinical advisement, health education, and other advisory services.  Call: [phone] (toll free)               Medications           Message regarding Medications     Verify the changes and/or additions to your medication regime listed below are the same as discussed with your clinician today.  If any of these changes or additions are incorrect, please notify your healthcare provider.        START taking these NEW medications        Refills    azithromycin (Z-MILTON) 250 MG tablet 0    Sig: Take 1 tablet (250 mg total) by mouth once daily. Take first 2 tablets together, then 1 every day until finished.    Class: Print    Route: Oral    fluticasone (FLONASE) 50 mcg/actuation nasal spray 0    Si spray by Each Nare route 2 (two) times daily as needed for Rhinitis.    Class: Print    Route: Each Nare    benzonatate (TESSALON) 100 MG capsule 0    Sig: Take 1 capsule (100 mg total) by mouth 3 (three) times daily as needed for Cough.    Class: Print    Route: Oral      These medications were administered today        Dose Freq    betamethasone acetate-betamethasone sodium phosphate injection 9 mg 9 mg ED 1 Time    Sig: Inject 1.5 mLs (9 mg total) into the muscle ED 1 Time.    Class: Normal    Route: Intramuscular    hydrocodone-acetaminophen 10-325mg per tablet 1 tablet 1 tablet ED 1 Time    Sig: Take 1 tablet by mouth ED 1 Time.    Class: Normal    Route: Oral    ondansetron disintegrating tablet 4 mg 4 mg ED 1 Time    Sig: Take 1 tablet (4 mg total) by mouth ED 1 Time.    Class:  Normal    Route: Oral           Verify that the below list of medications is an accurate representation of the medications you are currently taking.  If none reported, the list may be blank. If incorrect, please contact your healthcare provider. Carry this list with you in case of emergency.           Current Medications     acetaminophen (TYLENOL) 500 MG tablet Take 500 mg by mouth every 6 (six) hours as needed for Pain.    aspirin (ECOTRIN) 81 MG EC tablet Take 1 tablet (81 mg total) by mouth once daily.    atorvastatin (LIPITOR) 80 MG tablet Take 1 tablet (80 mg total) by mouth once daily.    azithromycin (Z-MILTON) 250 MG tablet Take 1 tablet (250 mg total) by mouth once daily. Take first 2 tablets together, then 1 every day until finished.    benzonatate (TESSALON) 100 MG capsule Take 1 capsule (100 mg total) by mouth 3 (three) times daily as needed for Cough.    BUTALBITAL/ASPIRIN/CAFFEINE (FIORINAL ORAL) Take by mouth as needed.    DOC-Q-LACE 100 mg capsule TAKE ONE CAPSULE BY MOUTH THREE TIMES DAILY AS NEEDED FOR CONSTIPATION    fluoxetine (PROZAC) 20 MG capsule Take 2 capsules (40 mg total) by mouth once daily.    fluticasone (FLONASE) 50 mcg/actuation nasal spray 1 spray by Each Nare route 2 (two) times daily as needed for Rhinitis.    hydrocodone-acetaminophen 10-325mg (NORCO)  mg Tab Take 1 tablet by mouth every 6 (six) hours as needed.    hyoscyamine (ANASPAZ,LEVSIN) 0.125 mg Tab TAKE 1 TABLET (125 MCG TOTAL) BY MOUTH EVERY 6 (SIX) HOURS AS NEEDED.    levothyroxine (SYNTHROID) 112 MCG tablet Take 1 tablet (112 mcg total) by mouth before breakfast.    lidocaine-prilocaine (EMLA) cream     ondansetron (ZOFRAN) 8 MG tablet Take 1 tablet (8 mg total) by mouth every 12 (twelve) hours as needed for Nausea.    oxybutynin (DITROPAN XL) 15 MG TR24     pantoprazole (PROTONIX) 40 MG tablet Take 1 tablet (40 mg total) by mouth once daily.    polyethylene glycol (GLYCOLAX) 17 gram PwPk Take 17 g by mouth 2 (two)  "times daily.    ranitidine (ZANTAC) 150 MG capsule Take 150 mg by mouth 2 (two) times daily.    SENNOSIDES (SENNA LAX ORAL) Take by mouth as needed.    trazodone (DESYREL) 50 MG tablet Take 3 tablets (150 mg total) by mouth every evening.    VOLTAREN 1 % Gel            Clinical Reference Information           Your Vitals Were     BP Pulse Temp Resp Height Weight    120/57 92 98.3 °F (36.8 °C) 20 5' 4" (1.626 m) 68.9 kg (152 lb)    Last Period SpO2 BMI          (LMP Unknown) 98% 26.09 kg/m2        Allergies as of 5/20/2017        Reactions    Imitrex [Sumatriptan Succinate] Shortness Of Breath    Penicillins Shortness Of Breath    Other reaction(s): Jittery    Percocet [Oxycodone-acetaminophen] Anaphylaxis    Topamax [Topiramate] Shortness Of Breath    Vancomycin Shortness Of Breath    Rash    (D)-limonene Flavor     Other reaction(s): difficult intubation  Other reaction(s): Jittery  Other reaction(s): Difficulty breathing  Other reaction(s): Difficulty breathing  Other reaction(s): Jittery  Other reaction(s): Difficulty breathing    Bactrim [Sulfamethoxazole-trimethoprim] Nausea And Vomiting    Other reaction(s): Resp Depression  Other reaction(s): Anaphylaxis    Butorphanol Tartrate     Darvocet A500 [Propoxyphene N-acetaminophen]     Other reaction(s): Jittery  Other reaction(s): Difficulty breathing    Fentanyl     Other reaction(s): Vomiting  Other reaction(s): Nausea  Other reaction(s): Itching  swelling    Phenytoin Sodium Extended     pATIENT DENIES EVER HAVING THIS MEDICATION    White Petrolatum-zinc     Zinc Oxide-white Petrolatum     Other reaction(s): Difficulty breathing    Latex, Natural Rubber Itching, Rash      Immunizations Administered on Date of Encounter - 5/20/2017     None      ED Micro, Lab, POCT     None      ED Imaging Orders     Start Ordered       Status Ordering Provider    05/20/17 2002 05/20/17 2002  X-Ray Shoulder 2 or More Views Left  1 time imaging      Final result     05/20/17 1936 " 05/20/17 1938  X-Ray Chest PA And Lateral  1 time imaging      Final result         Discharge Instructions           Sinus Headaches     When using nasal spray, keep your chin down and angle the spray away from center.     Sinus headaches can cause a gnawing pain behind the nose and eyes. The pain most often gets worse in the afternoon and evening. You may also run a fever. Sinus headaches are caused by colds or allergies that make the nasal passages inflamed or infected.  To help prevent sinus headaches:  · Treat colds promptly to keep mucus from backing up.  · Avoid things that trigger sinus problems, such as pollens, dust, smoke, fumes, and strong odors.  · Take allergy medicines as directed by your healthcare provider.  To relieve the pain:  · Keep your sinuses open and free of mucus. Try over-the-counter sinus rinse products.  · Use a nasal decongestant as directed to reduce the inflammation.  · Drink fluids to keep the mucus thinner. This helps it drain more easily. You can also use a humidifier.  · Apply hot packs to the area around your sinuses. Use a hot water bottle.  · See your healthcare provider if your sinus headache lasts more than 2 weeks. You may need medicine for a sinus infection or an exam to check for other headache conditions, like migraines.  Date Last Reviewed: 10/1/2016  © 6206-3149 [employer]. [address] [place] [address], [place] [address]. All rights reserved. This information is not intended as a substitute for professional medical care. Always follow your healthcare professional's instructions.          Sinusitis (Antibiotic Treatment)    The sinuses are air-filled spaces within the bones of the face. They connect to the inside of the nose. Sinusitis is an inflammation of the tissue lining the sinus cavity. Sinus inflammation can occur during a cold. It can also be due to allergies to pollens and other particles in the air. Sinusitis can cause symptoms of sinus congestion and  fullness. A sinus infection causes fever, headache and facial pain. There is often green or yellow drainage from the nose or into the back of the throat (post-nasal drip). You have been given antibiotics to treat this condition.  Home care:  · Take the full course of antibiotics as instructed. Do not stop taking them, even if you feel better.  · Drink plenty of water, hot tea, and other liquids. This may help thin mucus. It also may promote sinus drainage.  · Heat may help soothe painful areas of the face. Use a towel soaked in hot water. Or,  the shower and direct the hot spray onto your face. Using a vaporizer along with a menthol rub at night may also help.   · An expectorant containing guaifenesin may help thin the mucus and promote drainage from the sinuses.  · Over-the-counter decongestants may be used unless a similar medicine was prescribed. Nasal sprays work the fastest. Use one that contains phenylephrine or oxymetazoline. First blow the nose gently. Then use the spray. Do not use these medicines more often than directed on the label or symptoms may get worse. You may also use tablets containing pseudoephedrine. Avoid products that combine ingredients, because side effects may be increased. Read labels. You can also ask the pharmacist for help. (NOTE: Persons with high blood pressure should not use decongestants. They can raise blood pressure.)  · Over-the-counter antihistamines may help if allergies contributed to your sinusitis.    · Do not use nasal rinses or irrigation during an acute sinus infection, unless told to by your health care provider. Rinsing may spread the infection to other sinuses.  · Use acetaminophen or ibuprofen to control pain, unless another pain medicine was prescribed. (If you have chronic liver or kidney disease or ever had a stomach ulcer, talk with your doctor before using these medicines. Aspirin should never be used in anyone under 18 years of age who is ill with a  fever. It may cause severe liver damage.)  · Don't smoke. This can worsen symptoms.  Follow-up care  Follow up with your healthcare provider or our staff if you are not improving within the next week.  When to seek medical advice  Call your healthcare provider if any of these occur:  · Facial pain or headache becoming more severe  · Stiff neck  · Unusual drowsiness or confusion  · Swelling of the forehead or eyelids  · Vision problems, including blurred or double vision  · Fever of 100.4ºF (38ºC) or higher, or as directed by your healthcare provider  · Seizure  · Breathing problems  · Symptoms not resolving within 10 days  Date Last Reviewed: 4/13/2015  © 3537-0497 [employer]. [address] [place] [address], [place] [address]. All rights reserved. This information is not intended as a substitute for professional medical care. Always follow your healthcare professional's instructions.          Upper Extremity Contusion  You have a contusion (bruise) of an upper extremity (arm, wrist, hand, or fingers). Symptoms include pain, swelling, and skin discoloration. No bones are broken. This injury may take from a few days to a few weeks to heal.  During that time, the bruise may change from reddish in color, to purple-blue, to green-yellow, to yellow-brown.  Home care  · Unless another medication was prescribed, you can take acetaminophen, ibuprofen, or naproxen to control pain. (If you have chronic liver or kidney disease or ever had a stomach ulcer or GI bleeding, talk with your doctor before using these medicines.)  · Elevate the injured area to reduce pain and swelling. As much as possible, sit or lie down with the injured area raised about the level of your heart. This is especially important during the first 48 hours.  · Ice the injured area to help reduce pain and swelling. Wrap a cold source (ice pack or ice cubes in a plastic bag) in a thin towel. Apply to the bruised area for 20 minutes every 1 to 2 hours  the first day. Continue this 3 to 4 times a day until the pain and swelling goes away.  · If a sling was provided, you may remove it to shower or bathe. To prevent joint stiffness, do not wear it for more than one week.  Follow up  Follow up with your health care provider or our staff as advised. Call if you are not improving within the next 1 to 2 weeks.  When to seek medical advice   Call your health care provider right away if any of these occur:  · Increased pain or swelling  · Hand or fingers become cold, blue, numb or tingly  · Signs of infection: Warmth, drainage, or increased redness or pain around the injury  · Inability to move the injured body part   · Frequent bruising for unknown reasons  Date Last Reviewed: 4/29/2015 © 2000-2016 [employer]. [address] [place] [address], [place] [address]. All rights reserved. This information is not intended as a substitute for professional medical care. Always follow your healthcare professional's instructions.          Arthralgia    Arthralgia is the term for pain in or around the joint. It is a symptom, not a disease. This pain may involve one or more joints. In some cases, the pain moves from joint to joint.  There are many causes for joint pain. These include:  · Injury  · Osteoarthritis (wearing out of the joint surface)  · Gout (inflammation of the joint due to crystals in the joint fluid)  · Infection inside the joint    · Bursitis (inflammation of the fluid-filled sacs around the joint)  · Autoimmune disorders such as rheumatoid arthritis or lupus  · Tendonitis (inflamation of chords that attach muscle to bone)  Home care  · Rest the involved joint(s) until your symptoms improve.   · You may be prescribed pain medication. If none is prescribed, you may use acetaminophen or ibuprofen to control pain and inflammation.  Follow up  Follow up with your healthcare provider or our staff as advised.  When to seek medical care  Contact your healthcare  provider right away if any of the following occurs:  · Pain, swelling, or redness of joint increases  · Pain worsens or recurs after a period of improvement  · Pain moves to other joints  · You cannot bear weight on the affected joint   · You cannot move the affected joint  · Joint appears deformed  · New rash appears  · Fever of 101ºF (38.8ºC) or higher, or as directed by your healthcare provider  Date Last Reviewed: 4/26/2015  © 1547-6523 [employer]. [address] [place] [address]. All rights reserved. This information is not intended as a substitute for professional medical care. Always follow your healthcare professional's instructions.          Your Scheduled Appointments     May 23, 2017  1:20 PM CDT   Established Patient Visit with SHONDA Howe - Urology [place] ([facility] )    [address]  [place] [address]   [phone]            Felix 15, 2017  2:00 PM CDT   Consult with Taurus Govea MD   [facility] - Pain Management ([facility])    [address] [facility] [address]   [phone]            Aug 18, 2017 10:00 AM CDT   Established Patient Visit with MD Thierry Lundberg II viviana - Internal Medicine ([facility])    [address]  [place] [address]   [phone]               [facility] complies with applicable Federal civil rights laws and does not discriminate on the basis of race, color, national origin, age, disability, or sex.        Language Assistance Services     ATTENTION: Language assistance services are available, free of charge. Please call [phone].      ATENCIÓN: Si habla español, tiene a morgan disposición servicios gratuitos de asistencia lingüística. Llame al [phone].     CHÚ Ý: N?u b?n nói Ti?ng Vi?t, có các d?ch v? h? tr? ngôn ng? mi?n phí dành cho b?n. G?i s? [phone].

## 2017-05-21 NOTE — ED PROVIDER NOTES
Encounter Date: 5/20/2017       History     Chief Complaint   Patient presents with    Arm Pain     left axiallary arm pain that radiates to the left side of neck, headache, leg pain, nausea     Review of patient's allergies indicates:   Allergen Reactions    Imitrex [sumatriptan succinate] Shortness Of Breath    Penicillins Shortness Of Breath     Other reaction(s): Jittery    Percocet [oxycodone-acetaminophen] Anaphylaxis    Topamax [topiramate] Shortness Of Breath    Vancomycin Shortness Of Breath     Rash    (d)-limonene flavor      Other reaction(s): difficult intubation  Other reaction(s): Jittery  Other reaction(s): Difficulty breathing  Other reaction(s): Difficulty breathing  Other reaction(s): Jittery  Other reaction(s): Difficulty breathing    Bactrim [sulfamethoxazole-trimethoprim] Nausea And Vomiting     Other reaction(s): Resp Depression  Other reaction(s): Anaphylaxis    Butorphanol tartrate     Darvocet a500 [propoxyphene n-acetaminophen]      Other reaction(s): Jittery  Other reaction(s): Difficulty breathing    Fentanyl      Other reaction(s): Vomiting  Other reaction(s): Nausea  Other reaction(s): Itching  swelling    Phenytoin sodium extended      pATIENT DENIES EVER HAVING THIS MEDICATION    White petrolatum-zinc     Zinc oxide-white petrolatum      Other reaction(s): Difficulty breathing    Latex, natural rubber Itching and Rash     HPI Comments: Patient additionally c/o pain in left shoulder, axilla, left side of neck. Denies injury/trauma. Denies numbness/tingling. Denies fever/chills. No abdominal pain. Denies shortness of breath or chest pain    Patient is a 61 y.o. female presenting with the following complaint: URI. The history is provided by the patient.   URI   The primary symptoms include headaches, cough, nausea and arthralgias. Primary symptoms do not include fever, ear pain or vomiting. The current episode started several days ago. This is a new problem. The problem  has not changed since onset.  The headache began more than 2 days ago. The headache developed gradually. Headache is a recurrent problem. The headache is present continuously. Location/region(s) of the headache: frontal. The headache is not associated with aura, eye pain, visual change, paresthesias or loss of balance.   The cough is productive. The sputum is green.   Nausea began today. Associated with: cough, post nasal drip.   Symptoms associated with the illness include facial pain, sinus pressure, congestion and rhinorrhea.     Past Medical History:   Diagnosis Date    Allergy     Anxiety     Arthritis     Carotid artery occlusion     Cataract     Depression     Edema     chronic    Fever blister     Hypothyroid     Iron deficiency anemia     Joint pain     Lumbar radiculopathy     with chronic pain    Ocular migraine     Sleep apnea     cpap     Past Surgical History:   Procedure Laterality Date    BACK SURGERY      x 12    CATARACT EXTRACTION W/  INTRAOCULAR LENS IMPLANT Left     Dr Coleman     HYSTERECTOMY  1975    endometriosis    pain pump placement      SPINE SURGERY  5-13-13    CERVICAL FUSION     Family History   Problem Relation Age of Onset    Cancer Mother 55     breast    Cancer Father      esophagus,had laryngectomy    Parkinsonism Maternal Grandmother     Tremor Maternal Grandmother     No Known Problems Brother     No Known Problems Brother     Heart disease Maternal Uncle     No Known Problems Sister     No Known Problems Maternal Aunt     No Known Problems Paternal Aunt     No Known Problems Paternal Uncle     No Known Problems Maternal Grandfather     No Known Problems Paternal Grandmother     No Known Problems Paternal Grandfather     Melanoma Neg Hx     Amblyopia Neg Hx     Blindness Neg Hx     Cataracts Neg Hx     Diabetes Neg Hx     Glaucoma Neg Hx     Hypertension Neg Hx     Macular degeneration Neg Hx     Retinal detachment Neg Hx     Strabismus  Neg Hx     Stroke Neg Hx     Thyroid disease Neg Hx      Social History   Substance Use Topics    Smoking status: Never Smoker    Smokeless tobacco: Never Used    Alcohol use No      Comment: denies     Review of Systems   Constitutional: Negative for fever.   HENT: Positive for congestion, rhinorrhea and sinus pressure. Negative for ear pain.    Eyes: Negative for pain.   Respiratory: Positive for cough.    Gastrointestinal: Positive for nausea. Negative for vomiting.   Musculoskeletal: Positive for arthralgias and neck pain.   Neurological: Positive for headaches. Negative for paresthesias and loss of balance.   All other systems reviewed and are negative.      Physical Exam   Initial Vitals   BP Pulse Resp Temp SpO2   05/20/17 1839 05/20/17 1839 05/20/17 1839 05/20/17 1839 05/20/17 1839   120/57 92 20 98.3 °F (36.8 °C) 98 %     Physical Exam    Nursing note and vitals reviewed.  Constitutional: She appears well-developed and well-nourished.   HENT:   Head: Normocephalic and atraumatic.   Right Ear: Hearing and external ear normal.   Left Ear: Hearing and external ear normal.   Nose: Mucosal edema present. Right sinus exhibits maxillary sinus tenderness and frontal sinus tenderness. Left sinus exhibits maxillary sinus tenderness and frontal sinus tenderness.   Mouth/Throat: Uvula is midline and mucous membranes are normal.   Congestion. Purulent post nasal drip   Eyes: EOM are normal.   Neck: Trachea normal and normal range of motion. Neck supple.   Cardiovascular: Normal rate, regular rhythm and normal heart sounds.   Pulses:       Radial pulses are 2+ on the right side, and 2+ on the left side.   Pulmonary/Chest: Effort normal and breath sounds normal. No respiratory distress.   Abdominal: Soft. Bowel sounds are normal. There is no tenderness.   Genitourinary:   Genitourinary Comments: Motley catheter intact   Musculoskeletal:        Left shoulder: She exhibits decreased range of motion, tenderness, pain and  decreased strength. She exhibits no swelling and normal pulse.        Cervical back: Normal. She exhibits no bony tenderness.        Arms:  Painful ROM L shoulder; difficulty raising arm over head. Ecchymosis to left anterior shoulder and forearm. Right leg with ace bandage intact, applied by home health nurse PTA   Neurological: She is alert and oriented to person, place, and time. No cranial nerve deficit or sensory deficit.   Skin: Skin is warm and dry.         ED Course   Procedures  Labs Reviewed - No data to display  EKG Readings: (Independently Interpreted)   Initial Reading: No STEMI. Rhythm: Normal Sinus Rhythm. Heart Rate: 89. Ectopy: No Ectopy. Conduction: Normal. ST Segments: Normal ST Segments. T Waves: Normal. Clinical Impression: Normal Sinus Rhythm   EKG interpretation per ER physician, Dr. Diaz          Medical Decision Making:   Differential Diagnosis:   Pneumonia, URI, sinusitis , sinus headache, migraine  Clinical Tests:   Radiological Study: Ordered and Reviewed  ED Management:  PE, EKG, xray, celestone injection, po norco/zofran, RX    Patient feeling better but still has mild headache. Laying down resting comfortably in dark room on reassessment. Daughter at bedside.  Plan of care discussed and patient verbalized understanding.  Instructed to f/u with PCP this week. Patient has dilaudid pump for continued pain control. Will discharge with zpack, flonase, tessalon               Imaging Results         X-Ray Shoulder 2 or More Views Left (Final result) Result time:  05/20/17 20:34:08    Final result by Barb Rosado MD (05/20/17 20:34:08)    Impression:        No acute displaced fracture or dislocation identified.      Electronically signed by: BARB ROSADO MD, MD  Date:     05/20/17  Time:    20:34     Narrative:    COMPARISON: Left shoulder series 5/14/17 and chest radiograph same day    FINDINGS: 2 views left shoulder.      Partially imaged lower cervical ACDF hardware noted. No acute  displaced fracture, dislocation, or destructive osseous process identified.  Mild degenerative change at the left a.c. joint.   No subcutaneous emphysema or radiodense retained foreign body.            X-Ray Chest PA And Lateral (Final result) Result time:  05/20/17 20:06:19    Final result by Chilo Guo MD (05/20/17 20:06:19)    Impression:     No acute abnormality.      Electronically signed by: CHILO GUO MD  Date:     05/20/17  Time:    20:06     Narrative:    Chest PA and lateral    Comparison: February 1, 2017 and January 6, 2016    Findings: Lungs are clear. No effusion or pneumothorax. Unchanged mild enlargement of the cardiac silhouette. ACDF. Unchanged thoracic spine kyphoscoliosis and nerve stimulator. No displaced fracture.                Attending Attestation:     Physician Attestation Statement for NP/PA:   I have conducted a face to face encounter with this patient in addition to the NP/PA, due to NP/PA Request    Other NP/PA Attestation Additions:    History of Present Illness: Agree; 61-year-old female presents the emergency department complaining of headache, cough, nausea, neck and arm pain.  Onset several days ago.  Cough productive of yellow/green sputum.  No fever reported.  Also with sinus pressure and congestion.   Physical Exam: agree   Medical Decision Making: Agree; patient given symptomatically management with a negative chest x-ray, is feeling much better.  Patient given prescription for azithromycin, Flonase, benzonatate, instructed to follow-up with the primary care physician, return with any new or worsening symptoms.                 ED Course     Clinical Impression:   The primary encounter diagnosis was Acute sinusitis, recurrence not specified, unspecified location. Diagnoses of Arm pain, Left shoulder pain, unspecified chronicity, Contusion, upper extremity, left, initial encounter, and Sinus headache were also pertinent to this visit.    Disposition:   Disposition:  Discharged  Condition: Stable       Gwen Delgado NP  05/20/17 8528       Luisito Diaz MD  05/28/17 1206

## 2017-05-21 NOTE — DISCHARGE INSTRUCTIONS
Sinus Headaches     When using nasal spray, keep your chin down and angle the spray away from center.     Sinus headaches can cause a gnawing pain behind the nose and eyes. The pain most often gets worse in the afternoon and evening. You may also run a fever. Sinus headaches are caused by colds or allergies that make the nasal passages inflamed or infected.  To help prevent sinus headaches:  · Treat colds promptly to keep mucus from backing up.  · Avoid things that trigger sinus problems, such as pollens, dust, smoke, fumes, and strong odors.  · Take allergy medicines as directed by your healthcare provider.  To relieve the pain:  · Keep your sinuses open and free of mucus. Try over-the-counter sinus rinse products.  · Use a nasal decongestant as directed to reduce the inflammation.  · Drink fluids to keep the mucus thinner. This helps it drain more easily. You can also use a humidifier.  · Apply hot packs to the area around your sinuses. Use a hot water bottle.  · See your healthcare provider if your sinus headache lasts more than 2 weeks. You may need medicine for a sinus infection or an exam to check for other headache conditions, like migraines.  Date Last Reviewed: 10/1/2016  © 5717-6119 [employer]. [address] [place] [address], [place] [address]. All rights reserved. This information is not intended as a substitute for professional medical care. Always follow your healthcare professional's instructions.          Sinusitis (Antibiotic Treatment)    The sinuses are air-filled spaces within the bones of the face. They connect to the inside of the nose. Sinusitis is an inflammation of the tissue lining the sinus cavity. Sinus inflammation can occur during a cold. It can also be due to allergies to pollens and other particles in the air. Sinusitis can cause symptoms of sinus congestion and fullness. A sinus infection causes fever, headache and facial pain. There is often green or yellow drainage from  the nose or into the back of the throat (post-nasal drip). You have been given antibiotics to treat this condition.  Home care:  · Take the full course of antibiotics as instructed. Do not stop taking them, even if you feel better.  · Drink plenty of water, hot tea, and other liquids. This may help thin mucus. It also may promote sinus drainage.  · Heat may help soothe painful areas of the face. Use a towel soaked in hot water. Or,  the shower and direct the hot spray onto your face. Using a vaporizer along with a menthol rub at night may also help.   · An expectorant containing guaifenesin may help thin the mucus and promote drainage from the sinuses.  · Over-the-counter decongestants may be used unless a similar medicine was prescribed. Nasal sprays work the fastest. Use one that contains phenylephrine or oxymetazoline. First blow the nose gently. Then use the spray. Do not use these medicines more often than directed on the label or symptoms may get worse. You may also use tablets containing pseudoephedrine. Avoid products that combine ingredients, because side effects may be increased. Read labels. You can also ask the pharmacist for help. (NOTE: Persons with high blood pressure should not use decongestants. They can raise blood pressure.)  · Over-the-counter antihistamines may help if allergies contributed to your sinusitis.    · Do not use nasal rinses or irrigation during an acute sinus infection, unless told to by your health care provider. Rinsing may spread the infection to other sinuses.  · Use acetaminophen or ibuprofen to control pain, unless another pain medicine was prescribed. (If you have chronic liver or kidney disease or ever had a stomach ulcer, talk with your doctor before using these medicines. Aspirin should never be used in anyone under 18 years of age who is ill with a fever. It may cause severe liver damage.)  · Don't smoke. This can worsen symptoms.  Follow-up care  Follow up with  your healthcare provider or our staff if you are not improving within the next week.  When to seek medical advice  Call your healthcare provider if any of these occur:  · Facial pain or headache becoming more severe  · Stiff neck  · Unusual drowsiness or confusion  · Swelling of the forehead or eyelids  · Vision problems, including blurred or double vision  · Fever of 100.4ºF (38ºC) or higher, or as directed by your healthcare provider  · Seizure  · Breathing problems  · Symptoms not resolving within 10 days  Date Last Reviewed: 4/13/2015 © 2000-2016 [employer]. [address] [place] [address]. All rights reserved. This information is not intended as a substitute for professional medical care. Always follow your healthcare professional's instructions.          Upper Extremity Contusion  You have a contusion (bruise) of an upper extremity (arm, wrist, hand, or fingers). Symptoms include pain, swelling, and skin discoloration. No bones are broken. This injury may take from a few days to a few weeks to heal.  During that time, the bruise may change from reddish in color, to purple-blue, to green-yellow, to yellow-brown.  Home care  · Unless another medication was prescribed, you can take acetaminophen, ibuprofen, or naproxen to control pain. (If you have chronic liver or kidney disease or ever had a stomach ulcer or GI bleeding, talk with your doctor before using these medicines.)  · Elevate the injured area to reduce pain and swelling. As much as possible, sit or lie down with the injured area raised about the level of your heart. This is especially important during the first 48 hours.  · Ice the injured area to help reduce pain and swelling. Wrap a cold source (ice pack or ice cubes in a plastic bag) in a thin towel. Apply to the bruised area for 20 minutes every 1 to 2 hours the first day. Continue this 3 to 4 times a day until the pain and swelling goes away.  · If a sling was provided,  you may remove it to shower or bathe. To prevent joint stiffness, do not wear it for more than one week.  Follow up  Follow up with your health care provider or our staff as advised. Call if you are not improving within the next 1 to 2 weeks.  When to seek medical advice   Call your health care provider right away if any of these occur:  · Increased pain or swelling  · Hand or fingers become cold, blue, numb or tingly  · Signs of infection: Warmth, drainage, or increased redness or pain around the injury  · Inability to move the injured body part   · Frequent bruising for unknown reasons  Date Last Reviewed: 4/29/2015  © 0579-7923 [employer]. [address] [place] [address], [place] [address]. All rights reserved. This information is not intended as a substitute for professional medical care. Always follow your healthcare professional's instructions.          Arthralgia    Arthralgia is the term for pain in or around the joint. It is a symptom, not a disease. This pain may involve one or more joints. In some cases, the pain moves from joint to joint.  There are many causes for joint pain. These include:  · Injury  · Osteoarthritis (wearing out of the joint surface)  · Gout (inflammation of the joint due to crystals in the joint fluid)  · Infection inside the joint    · Bursitis (inflammation of the fluid-filled sacs around the joint)  · Autoimmune disorders such as rheumatoid arthritis or lupus  · Tendonitis (inflamation of chords that attach muscle to bone)  Home care  · Rest the involved joint(s) until your symptoms improve.   · You may be prescribed pain medication. If none is prescribed, you may use acetaminophen or ibuprofen to control pain and inflammation.  Follow up  Follow up with your healthcare provider or our staff as advised.  When to seek medical care  Contact your healthcare provider right away if any of the following occurs:  · Pain, swelling, or redness of joint increases  · Pain worsens or  recurs after a period of improvement  · Pain moves to other joints  · You cannot bear weight on the affected joint   · You cannot move the affected joint  · Joint appears deformed  · New rash appears  · Fever of 101ºF (38.8ºC) or higher, or as directed by your healthcare provider  Date Last Reviewed: 4/26/2015  © 0933-7252 [employer]. [address] [place] [address], [place] [address]. All rights reserved. This information is not intended as a substitute for professional medical care. Always follow your healthcare professional's instructions.

## 2017-05-22 ENCOUNTER — OUTPATIENT CASE MANAGEMENT (OUTPATIENT)
Dept: ADMINISTRATIVE | Facility: OTHER | Age: 62
End: 2017-05-22

## 2017-05-22 NOTE — PROGRESS NOTES
Attempt to follow up for Outpatient Care Management; spoke to pts  who reports pt is about to eat dinner and has doctor's appointments this week. Pts  asked that I call back on another day. . Modesta [facility]ENDO[facility]      Follow-up Plan:  -Continue to educate about CHF. Review signs and symptoms of CHF flare up. Encourage patient to follow medication and treatment regimen. Encourage patient to maintain follow up with doctors.  -Continue to educate about fall prevention and safety in home setting. Encourage patient to follow medication and treatment regimen. Encourage patient to maintain follow up with doctors.  -Continue to educate about the bodies response to pain. Encourage patient to follow medication and treatment regimen. Encourage patient to maintain follow up with doctors.  --Collaborate with [facility] SW about available resources.

## 2017-05-23 ENCOUNTER — OUTPATIENT CASE MANAGEMENT (OUTPATIENT)
Dept: ADMINISTRATIVE | Facility: OTHER | Age: 62
End: 2017-05-23

## 2017-05-23 ENCOUNTER — TELEPHONE (OUTPATIENT)
Dept: INTERNAL MEDICINE | Facility: CLINIC | Age: 62
End: 2017-05-23

## 2017-05-23 RX ORDER — ALPRAZOLAM 0.5 MG/1
TABLET ORAL
Qty: 60 TABLET | Refills: 0 | OUTPATIENT
Start: 2017-05-23

## 2017-05-23 NOTE — TELEPHONE ENCOUNTER
Patient wanted to let you know that she is currently taking an antibiotic for a sinus infection along with tessalon pearls and nasal spray.

## 2017-05-23 NOTE — PROGRESS NOTES
"5/23/17: LCSW attempted to contact pt for follow up.  Pt's  answered and stated his wife was sleeping.  Pt's  stated how his anxiety was high last week with his wife going to the emergency room; he stated this week has been better.  Pt's  stated they did not have any further social needs or concerns at this time.      5/9/17: LCSW received voicemail from pt; attempted to call pt back.  Pt's  answered the phone and stated his wife was undergoing OT evaluation.  Pt's  gave the phone to OT named Adriana.  LCSW introduced self to OT and stated he could call pt back at a later time to follow up with her.  4/24/17: LCSW contacted pt for follow up.  Pt self-reports calling [facility] and they said she would need to come to them to complete an initial assessment.  Pt stated she has also been receiving telephonic sessions with [facility] counselor.  Pt is interested in obtaining a "wilmar chair."  LCSW will send RN in-basket message and will follow up with pt in a couple of weeks.      5/8/17: LCSW attempted to contact pt for follow up; no answer, left voicemail.  LCSW contacted a second number and left a message with pt's  to call him back.    4/6/17: LCSW contacted pt for follow up.  Pt self-reports being in a lot of pain today.  LCSW asked pt if she had a chance to contact [facility] yet; she stated she has not because she forgot where she wrote down the number.  LCSW gave pt the number to [facility] ([phone]) to schedule an assessment.  LCSW also gave pt the number to [facility] ([phone]) for 24/7 crisis counseling.  LCSW encouraged pt to follow up with [facility] and contact [facility] if needed.     3/30/17: LCSW contacted [facility] for information on their referral process; was told by representative that pt could call and they would complete an assessment over the phone with them.  LCSW contacted pt for follow up; pt stated she did " not have a good doctor's visit yesterday; she would not elaborate.  [facility] number to [facility] ([phone]) to complete an assessment.  LCSW will follow up with pt next week.     3/22/17: LCSW contacted pt for follow up.  Pt self-reports continuing to be in chronic pain due to multiple back surgeries and falls.  Pt inquired about receiving more PT and OT; explained to pt how an order would need to be made by her PCP and encouraged her to bring it up to him at her appointment tomorrow.  Pt also inquired about home care services; LCSW explained to pt how many home care agencies do not accept insurance(s), only long-term policies, and normally require a 5 hour minimum.  Pt does not believe she will be able to pay for home care services as her and her  are on a fixed income.  [facility] will research home care agencies within the [facility].  Pt expressed an interest in receiving in-home behavioral health therapy.  [facility] health agencies who make therapeutic home visits within the [facility].       3/16/17: [facility] to complete [facility] assessment.  Pt was currently seeing a doctor but  was able to complete SW assessment.  The pt is a 62 yo female with hx of major depressive disorder - single episode, anxiety; reason for referral is pt wants help in the home through a  program and has financial issues that impact health care and daily living.  Pt's  talked about how their medical expenses ($50 office visit co-pays, medications) has caused them financial difficulties.  Pt's  stated how they are currently in the process of applying to [facility]'s Financial Assistance program.  Pt's  self-reports his wife is receiving home health through [facility]; he also states how his wife is in the process of applying to [facility] for additional services.  Pt is currently receiving treatment for her  depression from Psychiatrist (Dr. Oconnor).

## 2017-05-23 NOTE — TELEPHONE ENCOUNTER
----- Message from Aviva Gabrielle sent at 5/22/2017  8:26 AM CDT -----  Contact: pt [phone]  Pt would like a call from the nurse she said she went to the ER on Saturday for chest pains and a sinus infection,please advise

## 2017-05-24 ENCOUNTER — TELEPHONE (OUTPATIENT)
Dept: INTERNAL MEDICINE | Facility: CLINIC | Age: 62
End: 2017-05-24

## 2017-05-24 NOTE — TELEPHONE ENCOUNTER
Spoke to patient advised of needing apt. Patient will call to scheduled appointment at later date.

## 2017-05-24 NOTE — TELEPHONE ENCOUNTER
I can't evaluate a rash over the phone or email.  She must come for an appointment.  Period.    Please inform patient.  D

## 2017-05-24 NOTE — TELEPHONE ENCOUNTER
Spoke to patient this morning and she states she has blisters all on her lips and inside her cheeks. Offered UC appointment patient refused stating she was to weak to come in. Advised patient that I would send you a message but that you weren't in the office today. Please advise

## 2017-05-25 ENCOUNTER — OUTPATIENT CASE MANAGEMENT (OUTPATIENT)
Dept: ADMINISTRATIVE | Facility: OTHER | Age: 62
End: 2017-05-25

## 2017-05-25 NOTE — PROGRESS NOTES
2nd attempt to complete follow up for OPCM, spoke to pts  who took my number and reports pt will all me back. As this is my second unsuccessful attempt to complete initial assessment for OPCM, I will mail a letter with my contact information. Modesta MENDOZA-OPCM

## 2017-05-25 NOTE — LETTER
May 25, 2017    Tasha Hawley  [address]  [place] [address]             [facility] [address] [place] [address] Dear Mrs. Hawley,    I work with [facility] Outpatient Case Management Department. I have been unsuccessful at reaching you to follow-up to see how you have been doing. Please call me back at your earliest convenience to discuss your health care needs.      I can be reached at [phone] from 8:00AM to 4:30 PM on Monday thru Friday. [facility] also has a program where a nurse is available 24/7 to answer questions or provide medical advice, their number is [phone].    Thanks,      Jackie Polk RN  Outpatient Case Management

## 2017-05-29 ENCOUNTER — OUTPATIENT CASE MANAGEMENT (OUTPATIENT)
Dept: ADMINISTRATIVE | Facility: OTHER | Age: 62
End: 2017-05-29

## 2017-05-29 NOTE — PROGRESS NOTES
3rd Attempt to follow up for Outpatient Care Management;left message requesting a return call. A letter was mailed at the time of my 2nd attempt to complete initial assessment, requesting a return call from patient. Will close case at this time. Modesta MENDOZA-OPCM

## 2017-05-31 ENCOUNTER — OUTPATIENT CASE MANAGEMENT (OUTPATIENT)
Dept: ADMINISTRATIVE | Facility: OTHER | Age: 62
End: 2017-05-31

## 2017-05-31 NOTE — PROGRESS NOTES
Spoke to pt who asked that I send messages to Dr. Mayo and Dr. Batista requesting appointments with each. This nurse sent in basket messages to each of the Doctors asking that someone from their office call the pt to schedule appointment. Pt also asked if I could call in a refill for her pain patches. This nurse told the pt we do not call in refills on medications. Pt verbalized understanding and asked that I message the doctors for her 2 appointments. RANDY Polk RN-OPCM

## 2017-06-02 ENCOUNTER — TELEPHONE (OUTPATIENT)
Dept: INTERNAL MEDICINE | Facility: CLINIC | Age: 62
End: 2017-06-02

## 2017-06-02 NOTE — TELEPHONE ENCOUNTER
----- Message from Natasha Wilkerson sent at 6/2/2017  2:34 PM CDT -----  Contact: Self/ [phone]   Pt is calling to speak with someone in the office about a medication. Please call and advise     Thank you

## 2017-06-02 NOTE — TELEPHONE ENCOUNTER
Patient states she'd like you to fill this rx for her instead of Pain management. Patient also wants to know what she can take otc for congestion.

## 2017-06-02 NOTE — TELEPHONE ENCOUNTER
----- Message from Natasha Wilkerson sent at 6/2/2017  2:34 PM CDT -----  Contact: Self/ [phone]   Pt is calling to speak with someone in the office about a medication. Please call and advise     Thank you

## 2017-06-02 NOTE — TELEPHONE ENCOUNTER
----- Message from Nancy Lama sent at 6/1/2017  4:04 PM CDT -----  Contact: Self/[phone] home  Pt said that she is calling in regards to she is having a lot of congestion and she is coughing up green mucous pt stated that she cannot get the cold to come up pt also stated that she cannot come in for an appointment, she stated that she finished her 7 day Z-pack. Please call and advise          Thank you

## 2017-06-04 RX ORDER — ALPRAZOLAM 0.5 MG/1
0.5 TABLET, EXTENDED RELEASE ORAL 2 TIMES DAILY
Qty: 60 TABLET | Refills: 2 | Status: SHIPPED | OUTPATIENT
Start: 2017-06-04 | End: 2017-07-04

## 2017-06-06 RX ORDER — ALPRAZOLAM 0.5 MG/1
TABLET ORAL
Qty: 60 TABLET | Refills: 0 | OUTPATIENT
Start: 2017-06-06

## 2017-06-08 ENCOUNTER — TELEPHONE (OUTPATIENT)
Dept: INTERNAL MEDICINE | Facility: CLINIC | Age: 62
End: 2017-06-08

## 2017-06-08 ENCOUNTER — OFFICE VISIT (OUTPATIENT)
Dept: UROLOGY | Facility: CLINIC | Age: 62
End: 2017-06-08
Payer: MEDICARE

## 2017-06-08 VITALS
WEIGHT: 152 LBS | BODY MASS INDEX: 25.95 KG/M2 | DIASTOLIC BLOOD PRESSURE: 44 MMHG | HEART RATE: 52 BPM | SYSTOLIC BLOOD PRESSURE: 102 MMHG | HEIGHT: 64 IN

## 2017-06-08 DIAGNOSIS — N31.2 FLACCID NEUROGENIC BLADDER: Primary | ICD-10-CM

## 2017-06-08 PROCEDURE — 99999 PR PBB SHADOW E&M-EST. PATIENT-LVL III: CPT | Mod: PBBFAC,,, | Performed by: UROLOGY

## 2017-06-08 PROCEDURE — 51705 CHANGE OF BLADDER TUBE: CPT | Mod: S$GLB,,, | Performed by: UROLOGY

## 2017-06-08 PROCEDURE — 99214 OFFICE O/P EST MOD 30 MIN: CPT | Mod: 25,S$GLB,, | Performed by: UROLOGY

## 2017-06-08 NOTE — TELEPHONE ENCOUNTER
Rx was printed over the weekend and wasn't og printer when I came on Monday. I [facility] today.

## 2017-06-08 NOTE — TELEPHONE ENCOUNTER
----- Message from uLlu Hays sent at 6/8/2017  1:56 PM CDT -----  Contact: pt [phone]  RX request - refill or new RX.  Is this a refill or new RX:  Refill   RX name and strength: alprazolam (XANAX XR) 0.5 MG Tb24  Directions: twice a day   Is this a 30 day or 90 day RX:  30  Pharm[facility] name and phone #: [facility] [phone] (Phone)  [phone] (Fax)  Comments:

## 2017-06-08 NOTE — TELEPHONE ENCOUNTER
----- Message from Karla Barboza sent at 6/8/2017  3:24 PM CDT -----  Contact: Nino/[facility]/[phone]  Nino called needing clarification on what is the correct doses for alprazolam (XANAX XR) 0.5 MG Tb24. Please call and advise.           Thank you!!!

## 2017-06-09 ENCOUNTER — TELEPHONE (OUTPATIENT)
Dept: INTERNAL MEDICINE | Facility: CLINIC | Age: 62
End: 2017-06-09

## 2017-06-09 NOTE — TELEPHONE ENCOUNTER
----- Message from Iqra Mitchell MA sent at 6/9/2017  1:11 PM CDT -----  Contact: Cathleen mike/[facility] - [phone]  Patient was previously on Xanax 0.5 MG and Dr Hodges wrote a New Rx for Xanax XR 0.5 MG. Please advise if this was intended. Thanks!

## 2017-06-11 NOTE — PROGRESS NOTES
CHIEF COMPLAINT:    Mrs. Hawley is a 61 y.o. female presenting for a follow up stating that she has a concern for a lump near the suprapubic tube site.    PRESENTING ILLNESS:    Tasha Hawley is a 61 y.o. female who returns stating that she has noticed a lump in the abdomen near the suprapubic tube.  Her  states that she has been using a compressive pump on the lower extremities and when he uses that on her, there is increased urine output.  She feels better as a result.  She still has a lot of anxiety.      REVIEW OF SYSTEMS:    Review of Systems   Constitutional: Negative.    HENT: Negative.    Eyes: Negative.    Respiratory: Negative.    Cardiovascular: Negative.    Gastrointestinal: Negative.    Genitourinary: Negative.    Musculoskeletal: Positive for back pain, myalgias and neck pain.   Skin: Negative.    Neurological: Negative.    Endo/Heme/Allergies: Negative.    Psychiatric/Behavioral: The patient is nervous/anxious.        PATIENT HISTORY:    Past Medical History:   Diagnosis Date    Allergy     Anxiety     Arthritis     Carotid artery occlusion     Cataract     Depression     Edema     chronic    Fever blister     Hypothyroid     Iron deficiency anemia     Joint pain     Lumbar radiculopathy     with chronic pain    Ocular migraine     Sleep apnea     cpap       Past Surgical History:   Procedure Laterality Date    BACK SURGERY      x 12    CATARACT EXTRACTION W/  INTRAOCULAR LENS IMPLANT Left     Dr Coleman     HYSTERECTOMY  1975    endometriosis    pain pump placement      SPINE SURGERY  5-13-13    CERVICAL FUSION       Family History   Problem Relation Age of Onset    Cancer Mother 55     breast    Cancer Father      esophagus,had laryngectomy    Parkinsonism Maternal Grandmother     Tremor Maternal Grandmother     No Known Problems Brother     No Known Problems Brother     Heart disease Maternal Uncle      Social History    Marital status:      Social  History Main Topics    Smoking status: Never Smoker    Smokeless tobacco: Never Used    Alcohol use No      Comment: denies    Drug use: No    Sexual activity: No       Allergies:  Imitrex [sumatriptan succinate]; Penicillins; Percocet [oxycodone-acetaminophen]; Topamax [topiramate]; Vancomycin; (d)-limonene flavor; Bactrim [sulfamethoxazole-trimethoprim]; Butorphanol tartrate; Darvocet a500 [propoxyphene n-acetaminophen]; Fentanyl; Phenytoin sodium extended; White petrolatum-zinc; Zinc oxide-white petrolatum; and Latex, natural rubber    Medications:  Outpatient Encounter Prescriptions as of 6/8/2017   Medication Sig Dispense Refill    acetaminophen (TYLENOL) 500 MG tablet Take 500 mg by mouth every 6 (six) hours as needed for Pain.      alprazolam (XANAX XR) 0.5 MG Tb24 Take 1 tablet (0.5 mg total) by mouth 2 (two) times daily. 60 tablet 2    aspirin (ECOTRIN) 81 MG EC tablet Take 1 tablet (81 mg total) by mouth once daily.  0    atorvastatin (LIPITOR) 80 MG tablet Take 1 tablet (80 mg total) by mouth once daily. (Patient taking differently: Take 80 mg by mouth every evening. ) 90 tablet 3    azithromycin (Z-MILTON) 250 MG tablet Take 1 tablet (250 mg total) by mouth once daily. Take first 2 tablets together, then 1 every day until finished. 6 tablet 0    BUTALBITAL/ASPIRIN/CAFFEINE (FIORINAL ORAL) Take by mouth as needed.      DOC-Q-LACE 100 mg capsule TAKE ONE CAPSULE BY MOUTH THREE TIMES DAILY AS NEEDED FOR CONSTIPATION 90 capsule 2    fluoxetine (PROZAC) 20 MG capsule Take 2 capsules (40 mg total) by mouth once daily. 60 capsule 2    hydrocodone-acetaminophen 10-325mg (NORCO)  mg Tab Take 1 tablet by mouth every 6 (six) hours as needed. 61 tablet 0    hyoscyamine (ANASPAZ,LEVSIN) 0.125 mg Tab TAKE 1 TABLET (125 MCG TOTAL) BY MOUTH EVERY 6 (SIX) HOURS AS NEEDED. 120 tablet 0    levothyroxine (SYNTHROID) 112 MCG tablet Take 1 tablet (112 mcg total) by mouth before breakfast. 90 tablet 3     lidocaine-prilocaine (EMLA) cream       ondansetron (ZOFRAN) 8 MG tablet Take 1 tablet (8 mg total) by mouth every 12 (twelve) hours as needed for Nausea. 60 tablet 3    oxybutynin (DITROPAN XL) 15 MG TR24       pantoprazole (PROTONIX) 40 MG tablet Take 1 tablet (40 mg total) by mouth once daily. 90 tablet 3    polyethylene glycol (GLYCOLAX) 17 gram PwPk Take 17 g by mouth 2 (two) times daily. 30 packet 5    ranitidine (ZANTAC) 150 MG capsule Take 150 mg by mouth 2 (two) times daily.      SENNOSIDES (SENNA LAX ORAL) Take by mouth as needed.      trazodone (DESYREL) 50 MG tablet Take 3 tablets (150 mg total) by mouth every evening. 90 tablet 3    VOLTAREN 1 % Gel       [DISCONTINUED] lamotrigine (LAMICTAL) 25 MG tablet Take 1 tablet (25 mg total) by mouth once daily. 30 tablet 6     No facility-administered encounter medications on file as of 6/8/2017.          PHYSICAL EXAMINATION:    The patient generally appears in good health, is appropriately interactive, and is in no apparent distress.    Skin: No lesions.    Mental: Cooperative with normal affect.    Neuro: Grossly intact.    HEENT: Normal. No evidence of lymphadenopathy.    Chest:  normal inspiratory effort.    Abdomen:  Soft, non-tender. No masses or organomegaly. Bladder is not palpable. No evidence of flank discomfort. No evidence of inguinal hernia.    Extremities: No clubbing, cyanosis, or edema      LABS:    Lab Results   Component Value Date    BUN 8 05/14/2017    CREATININE 0.7 05/14/2017     IMPRESSION:    Encounter Diagnoses   Name Primary?    Flaccid neurogenic bladder Yes       PLAN:    1. Was changed to a 20 Fr catheter and 20 ml was placed into the balloon.    2.  Reassured her about the lump on the abdomen, just where the skin is not as tethered as it is on the right side.  There is no discoloration, no mass within the area in question.  Nothing ballotable.    3.  They wanted to know if the home health could be continued.  I asked  that the home health send me the renewal order, which they will do.

## 2017-06-12 ENCOUNTER — TELEPHONE (OUTPATIENT)
Dept: UROLOGY | Facility: CLINIC | Age: 62
End: 2017-06-12

## 2017-06-12 DIAGNOSIS — N32.89 BLADDER SPASM: Primary | ICD-10-CM

## 2017-06-12 RX ORDER — OXYBUTYNIN CHLORIDE 15 MG/1
15 TABLET, EXTENDED RELEASE ORAL DAILY
Qty: 30 TABLET | Refills: 6 | Status: SHIPPED | OUTPATIENT
Start: 2017-06-12 | End: 2018-04-09 | Stop reason: SDUPTHER

## 2017-06-12 NOTE — TELEPHONE ENCOUNTER
Patient called stating that she had leakage around the catheter after it was changed.  This was not per urethra but around the catheter.  Likely bladder spasms.  Had reviewed her chart and advised her to take the oxybutynin but she stated she did not have any.  A refill was sent to the [facility] per her request.

## 2017-06-13 ENCOUNTER — TELEPHONE (OUTPATIENT)
Dept: GASTROENTEROLOGY | Facility: CLINIC | Age: 62
End: 2017-06-13

## 2017-06-13 NOTE — TELEPHONE ENCOUNTER
----- Message from Lisa Grace sent at 6/13/2017  8:16 AM CDT -----  Contact: [phone] - self  max - having trouble swallowing - please call patient at [phone]

## 2017-06-15 ENCOUNTER — TELEPHONE (OUTPATIENT)
Dept: INTERNAL MEDICINE | Facility: CLINIC | Age: 62
End: 2017-06-15

## 2017-06-15 ENCOUNTER — OFFICE VISIT (OUTPATIENT)
Dept: INTERNAL MEDICINE | Facility: CLINIC | Age: 62
End: 2017-06-15
Payer: MEDICARE

## 2017-06-15 ENCOUNTER — OFFICE VISIT (OUTPATIENT)
Dept: PAIN MEDICINE | Facility: CLINIC | Age: 62
End: 2017-06-15
Attending: ANESTHESIOLOGY
Payer: MEDICARE

## 2017-06-15 VITALS
BODY MASS INDEX: 25.6 KG/M2 | RESPIRATION RATE: 18 BRPM | TEMPERATURE: 98 F | DIASTOLIC BLOOD PRESSURE: 53 MMHG | HEIGHT: 64 IN | HEART RATE: 51 BPM | WEIGHT: 149.94 LBS | SYSTOLIC BLOOD PRESSURE: 97 MMHG

## 2017-06-15 VITALS
DIASTOLIC BLOOD PRESSURE: 64 MMHG | HEIGHT: 64 IN | OXYGEN SATURATION: 97 % | HEART RATE: 47 BPM | BODY MASS INDEX: 25.73 KG/M2 | SYSTOLIC BLOOD PRESSURE: 116 MMHG

## 2017-06-15 DIAGNOSIS — R13.10 PAINFUL SWALLOWING: ICD-10-CM

## 2017-06-15 DIAGNOSIS — B00.9 HERPES SIMPLEX: ICD-10-CM

## 2017-06-15 DIAGNOSIS — F11.20 NARCOTIC DEPENDENCY, CONTINUOUS: Primary | ICD-10-CM

## 2017-06-15 DIAGNOSIS — G89.4 CHRONIC PAIN SYNDROME: ICD-10-CM

## 2017-06-15 DIAGNOSIS — Z96.89 S/P INSERTION OF SPINAL CORD STIMULATOR: ICD-10-CM

## 2017-06-15 DIAGNOSIS — Z98.890 S/P INSERTION OF INTRATHECAL PUMP: ICD-10-CM

## 2017-06-15 DIAGNOSIS — K13.70 ORAL LESION: ICD-10-CM

## 2017-06-15 DIAGNOSIS — M47.816 FACET ARTHROPATHY, LUMBAR: ICD-10-CM

## 2017-06-15 DIAGNOSIS — M51.36 DEGENERATIVE DISC DISEASE, LUMBAR: ICD-10-CM

## 2017-06-15 DIAGNOSIS — R29.898 WEAKNESS OF BOTH LOWER EXTREMITIES: ICD-10-CM

## 2017-06-15 DIAGNOSIS — B08.5 HERPANGINA: Primary | ICD-10-CM

## 2017-06-15 DIAGNOSIS — I89.0 LYMPHEDEMA OF BOTH LOWER EXTREMITIES: ICD-10-CM

## 2017-06-15 DIAGNOSIS — M41.9 SCOLIOSIS OF LUMBAR SPINE, UNSPECIFIED SCOLIOSIS TYPE: ICD-10-CM

## 2017-06-15 LAB — DEPRECATED S PYO AG THROAT QL EIA: NEGATIVE

## 2017-06-15 PROCEDURE — 87081 CULTURE SCREEN ONLY: CPT

## 2017-06-15 PROCEDURE — 99204 OFFICE O/P NEW MOD 45 MIN: CPT | Mod: GC,S$GLB,, | Performed by: ANESTHESIOLOGY

## 2017-06-15 PROCEDURE — 87880 STREP A ASSAY W/OPTIC: CPT

## 2017-06-15 PROCEDURE — 99499 UNLISTED E&M SERVICE: CPT | Mod: S$GLB,,, | Performed by: ANESTHESIOLOGY

## 2017-06-15 PROCEDURE — 99999 PR PBB SHADOW E&M-EST. PATIENT-LVL V: CPT | Mod: PBBFAC,,, | Performed by: NURSE PRACTITIONER

## 2017-06-15 PROCEDURE — 99999 PR PBB SHADOW E&M-EST. PATIENT-LVL III: CPT | Mod: PBBFAC,,, | Performed by: ANESTHESIOLOGY

## 2017-06-15 PROCEDURE — 99213 OFFICE O/P EST LOW 20 MIN: CPT | Mod: S$GLB,,, | Performed by: NURSE PRACTITIONER

## 2017-06-15 RX ORDER — DOXYCYCLINE 100 MG/1
CAPSULE ORAL
COMMUNITY
Start: 2017-06-13 | End: 2017-08-21

## 2017-06-15 RX ORDER — ACYCLOVIR 400 MG/1
400 TABLET ORAL 3 TIMES DAILY
Qty: 21 TABLET | Refills: 0 | Status: ON HOLD | OUTPATIENT
Start: 2017-06-15 | End: 2017-08-22 | Stop reason: HOSPADM

## 2017-06-15 NOTE — TELEPHONE ENCOUNTER
----- Message from Abdon Madrigal sent at 6/15/2017  9:49 AM CDT -----  Contact: self/ [phone] home  Pt would like to speak with someone in the office about some symptoms she is having.  The pt states that she has had some throat issues for about two weeks and now has some mouth ulcers. She would like to get a call back to get some advisement and recommendations.  Please call and advise.    Thank you

## 2017-06-15 NOTE — PROGRESS NOTES
Chronic Pain - New Consult    Referring Physician: Robert Angeles MD    Chief Complaint:   Chief Complaint   Patient presents with    Low-back Pain        SUBJECTIVE:    Tasha Hawley presents to the clinic for the evaluation of low back pain. The pain started years ago following trauma requiring multiple fusions and spacers from L3-S1 followed by spinal cord stimulator and intrathecal pump. She has been seen by Dr. Cotter for opioid dependence and is currently seen by Dr. Hillman who continues to manage her intrathecal pump and referred her for evaluation for DRG      Pain starts in mid/upper back and radiates down legs into feet. Worst in anterior thighs. Pain is constant 10/10 sharp, for years. Previous SCS gave some relief, but multiple complications led to removal. Intrathecal pump placed prior to [place].     During examination interview patient was drowsy and closing eyes drifting off to sleep multiple times     Patient reports urinary incontinence, bowel incontinence, significant motor weakness and loss of sensations requiring a suprapubic catheter to be placed.    Physical Therapy/Home Exercise: yes, had home health, stopped coming     Pain Disability Index Review:  Last 3 PDI Scores 4/13/2017   Pain Disability Index (PDI) 58       Pain Medications:    - Opioids: Dilaudid (Hydromorphone) and intrathecal pain pump     report:  Reviewed and consistent with medication use as prescribed.    Pain Procedures:     Imaging:   Procedure Comment: 2.5-mm axial images of the lumbar spine were obtained without the administration of intravenous contrast material.  Coronal and sagittal reformatted images were reviewed.    Comparison: Lumbar spine radiographs 3/10/17    FINDINGS:    Please note the same numbering of vertebral bodies is used on today's exam as on plain film 3/10/17. With this numbering scheme, there are 6 non-rib-bearing segments. Levels are labeled on images in PACS.    There is a  dextroscoliotic curvature of the lumbar spine centered L2-3.  There are postoperative changes from posterior instrumented fusion with intervertebral disc spacers from L3-S1. There bilateral pedicle screws at L3, L4, and L5, and a single right pedicle screw at S1 with vertical connecting rods. Hardware appears intact. There is degenerative disc disease with intervertebral disc height loss and adjacent endplate changes most severe at L2-3. The vertebral body heights are well-maintained without evidence of fracture.    T12-L1: Lead from baclofen pump enters the spinal canal from the right. There is no significant spinal canal stenosis or neural foraminal narrowing.  L1-2 and L2-3: There is facet arthropathy contributing to mild bilateral neural foraminal narrowing. No significant spinal canal stenosis.  L3-4 and L4-5: There is significant streak artifact related to spinal hardware, limiting evaluation of the canal at these levels.  L5-S1: Streak artifact from spinal hardware somewhat limits evaluation of this level. No evidence of significant spinal canal or neural foraminal stenosis.    There is a right sided lead of a baclofen pump identified which enters the spinal canal at T11-12. The remaining visualized paravertebral structures demonstrate postoperative changes at the EG junction.   Impression         Postoperative changes from posterior instrumented fusion extending from L3-S1 as detailed above. Please note this significantly limits evaluation of the spinal canal at L3-4 and L4-5.    Significant dextroscoliosis of the lumbar spine centered at L2-3.    Degenerative changes with mild neural foraminal narrowing at L1-2 and L2-3.        ______________________________________     Electronically signed by resident: CRISTHIAN BARRY MD  Date: 04/10/17  Time: 13:48         Past Medical History:   Diagnosis Date    Allergy     Anxiety     Arthritis     Carotid artery occlusion     Cataract     Depression     Edema      chronic    Fever blister     Hypothyroid     Iron deficiency anemia     Joint pain     Lumbar radiculopathy     with chronic pain    Ocular migraine     Sleep apnea     cpap     Past Surgical History:   Procedure Laterality Date    BACK SURGERY      x 12    CATARACT EXTRACTION W/  INTRAOCULAR LENS IMPLANT Left     Dr Coleman     HYSTERECTOMY  1975    endometriosis    pain pump placement      SPINE SURGERY  5-13-13    CERVICAL FUSION     Social History     Social History    Marital status:      Spouse name: N/A    Number of children: N/A    Years of education: N/A     Occupational History    Not on file.     Social History Main Topics    Smoking status: Never Smoker    Smokeless tobacco: Never Used    Alcohol use No      Comment: denies    Drug use: No    Sexual activity: No     Other Topics Concern    Not on file     Social History Narrative    No narrative on file     Family History   Problem Relation Age of Onset    Cancer Mother 55     breast    Cancer Father      esophagus,had laryngectomy    Parkinsonism Maternal Grandmother     Tremor Maternal Grandmother     No Known Problems Brother     No Known Problems Brother     Heart disease Maternal Uncle     No Known Problems Sister     No Known Problems Maternal Aunt     No Known Problems Paternal Aunt     No Known Problems Paternal Uncle     No Known Problems Maternal Grandfather     No Known Problems Paternal Grandmother     No Known Problems Paternal Grandfather     Melanoma Neg Hx     Amblyopia Neg Hx     Blindness Neg Hx     Cataracts Neg Hx     Diabetes Neg Hx     Glaucoma Neg Hx     Hypertension Neg Hx     Macular degeneration Neg Hx     Retinal detachment Neg Hx     Strabismus Neg Hx     Stroke Neg Hx     Thyroid disease Neg Hx        Review of patient's allergies indicates:   Allergen Reactions    Imitrex [sumatriptan succinate] Shortness Of Breath    Penicillins Shortness Of Breath     Other  reaction(s): Jittery    Percocet [oxycodone-acetaminophen] Anaphylaxis    Topamax [topiramate] Shortness Of Breath    Vancomycin Shortness Of Breath     Rash    (d)-limonene flavor      Other reaction(s): difficult intubation  Other reaction(s): Jittery  Other reaction(s): Difficulty breathing  Other reaction(s): Difficulty breathing  Other reaction(s): Jittery  Other reaction(s): Difficulty breathing    Bactrim [sulfamethoxazole-trimethoprim] Nausea And Vomiting     Other reaction(s): Resp Depression  Other reaction(s): Anaphylaxis    Butorphanol tartrate     Darvocet a500 [propoxyphene n-acetaminophen]      Other reaction(s): Jittery  Other reaction(s): Difficulty breathing    Fentanyl      Other reaction(s): Vomiting  Other reaction(s): Nausea  Other reaction(s): Itching  swelling    Phenytoin sodium extended      pATIENT DENIES EVER HAVING THIS MEDICATION    White petrolatum-zinc     Zinc oxide-white petrolatum      Other reaction(s): Difficulty breathing    Latex, natural rubber Itching and Rash       Current Outpatient Prescriptions   Medication Sig    acetaminophen (TYLENOL) 500 MG tablet Take 500 mg by mouth every 6 (six) hours as needed for Pain.    alprazolam (XANAX XR) 0.5 MG Tb24 Take 1 tablet (0.5 mg total) by mouth 2 (two) times daily.    atorvastatin (LIPITOR) 80 MG tablet Take 1 tablet (80 mg total) by mouth once daily. (Patient taking differently: Take 80 mg by mouth every evening. )    azithromycin (Z-MILTON) 250 MG tablet Take 1 tablet (250 mg total) by mouth once daily. Take first 2 tablets together, then 1 every day until finished.    BUTALBITAL/ASPIRIN/CAFFEINE (FIORINAL ORAL) Take by mouth as needed.    DOC-Q-LACE 100 mg capsule TAKE ONE CAPSULE BY MOUTH THREE TIMES DAILY AS NEEDED FOR CONSTIPATION    doxycycline (MONODOX) 100 MG capsule     fluoxetine (PROZAC) 20 MG capsule Take 2 capsules (40 mg total) by mouth once daily.    hydrocodone-acetaminophen 10-325mg (NORCO)   mg Tab Take 1 tablet by mouth every 6 (six) hours as needed.    hyoscyamine (ANASPAZ,LEVSIN) 0.125 mg Tab TAKE 1 TABLET (125 MCG TOTAL) BY MOUTH EVERY 6 (SIX) HOURS AS NEEDED.    levothyroxine (SYNTHROID) 112 MCG tablet Take 1 tablet (112 mcg total) by mouth before breakfast.    lidocaine-prilocaine (EMLA) cream     ondansetron (ZOFRAN) 8 MG tablet Take 1 tablet (8 mg total) by mouth every 12 (twelve) hours as needed for Nausea.    oxybutynin (DITROPAN XL) 15 MG TR24 Take 1 tablet (15 mg total) by mouth once daily.    pantoprazole (PROTONIX) 40 MG tablet Take 1 tablet (40 mg total) by mouth once daily.    polyethylene glycol (GLYCOLAX) 17 gram PwPk Take 17 g by mouth 2 (two) times daily.    ranitidine (ZANTAC) 150 MG capsule Take 150 mg by mouth 2 (two) times daily.    SENNOSIDES (SENNA LAX ORAL) Take by mouth as needed.    trazodone (DESYREL) 50 MG tablet Take 3 tablets (150 mg total) by mouth every evening.    VOLTAREN 1 % Gel     (Magic mouthwash) 1:1:1 Benadryl 12.5mg/5ml liq, aluminum & magnesium hydroxide-simehticone (Maalox), lidocaine viscous 2% Swish and spit 5 mLs every 4 (four) hours as needed. for mouth sores    acyclovir (ZOVIRAX) 400 MG tablet Take 1 tablet (400 mg total) by mouth 3 (three) times daily.    aspirin (ECOTRIN) 81 MG EC tablet Take 1 tablet (81 mg total) by mouth once daily.     No current facility-administered medications for this visit.        REVIEW OF SYSTEMS:    GENERAL:  No weight loss, malaise or fevers.  HEENT:  Negative for frequent or significant headaches.  NECK:  Negative for lumps, goiter, pain and significant neck swelling.  RESPIRATORY:  Negative for cough, wheezing or shortness of breath.  CARDIOVASCULAR:  Negative for chest pain, leg swelling or palpitations.  GI:  Negative for abdominal discomfort, blood in stools or black stools or change in bowel habits.  MUSCULOSKELETAL:  See HPI.  SKIN:  Negative for lesions, rash, and itching.  PSYCH:  Negative  "for sleep disturbance, mood disorder and recent psychosocial stressors.  HEMATOLOGY/LYMPHOLOGY:  Negative for prolonged bleeding, bruising easily or swollen nodes.  NEURO:   No history of headaches, syncope, paralysis, seizures or tremors.  All other reviewed and negative other than HPI.    OBJECTIVE:    BP (!) 97/53   Pulse (!) 51   Temp 97.7 °F (36.5 °C) (Oral)   Resp 18   Ht 5' 4" (1.626 m)   Wt 68 kg (149 lb 14.6 oz)   LMP  (LMP Unknown)   BMI 25.73 kg/m²     PHYSICAL EXAMINATION:    General appearance: Well appearing, in no acute distress, alert and oriented x3.  Psych:  Mood and affect appropriate.  Skin: Skin color, texture, turgor normal, no rashes or lesions, in both upper and lower body.  Head/face:  Normocephalic, atraumatic. No palpable lymph nodes.  Neck: No pain to palpation over the cervical paraspinous muscles. Spurling Negative. No pain with neck flexion, extension, or lateral flexion.   Cor: RRR  Pulm: CTA  GI:  Soft and non-tender.  Back: Straight leg raising in the sitting and supine positions is + to radicular pain. Severe pain to palpation over the spine and paraspinous muscles. Unable to stand erect, in hunched over at approximately 45 degrees.     Extremities: Peripheral joint ROM is full and pain free without obvious instability or laxity in all four extremities. No deformities, edema, or skin discoloration. Good capillary refill.    Musculoskeletal: Shoulder, hip, sacroiliac and knee provocative maneuvers are negative. Bilateral upper and lower extremity strength is normal and symmetric.  No atrophy or tone abnormalities are noted.    Neuro: Bilateral upper and lower extremity coordination and muscle stretch reflexes are physiologic and symmetric.  Plantar response are downgoing. No loss of sensation is noted.  Gait: normal.    Gait: antalgic in wheel chair.    Motor Strength: 3/5 motor strength with right leg elevation. 4/5  Throughout remaining lower extremities.   Sensory: + " sensory deficit in the Right lower extremity compared to left.     L-Spine:  Decreased ROM with pain on forward flexion, was unable to further assess 2/2 pain   SI Joint/Hip: unable to assess due to pain       ASSESSMENT: 61 y.o. year old female with chronic low back pain, consistent with      1. Narcotic dependency, continuous     2. Lymphedema of both lower extremities     3. S/P insertion of spinal cord stimulator     4. S/P insertion of intrathecal pump     5. Scoliosis of lumbar spine, unspecified scoliosis type     6. Facet arthropathy, lumbar     7. Degenerative disc disease, lumbar     8. Weakness of both lower extremities     9. Chronic pain syndrome       Discussed with the patient that due to multiple instrumentation, in the back and the diffuse nature of the pain she is a candidate for DRG treatment for focal neuropathic pain  Of note: Patient looked sedated and sleepy through the whole encounter   PLAN:     - At Dr. Hillman discretion consider prialt for intrathecal pump as narcotics induced lower extremity swelling  - Do not believe candidate for DRG, as pain is not  focal neuropathic pain  Patient and discussed with her surgeon/pain physician changing her conventional stimulation to high frequency stimulation as she has a surgical paddle lead   - RTC PRN  - Counseled patient regarding the importance of activity modification and medication management.    The above plan and management options were discussed at length with patient. Patient is in agreement with the above and verbalized understanding. It will be communicated with the referring physician via electronic record, fax, or mail.    Matt Resendiz  06/15/2017    I have personally reviewed the history and exam of this patient and agree with the resident's note as stated above.    Taurus Govea MD

## 2017-06-15 NOTE — PROGRESS NOTES
INTERNAL MEDICINE PROGRESS/URGENT CARE NOTE    CHIEF COMPLAINT     Chief Complaint   Patient presents with    Blister     inside mouth, last night     Sore Throat     unable to eat or drink       HPI     Tasha Hawley is a 61 y.o. female with anxiety, chronic pain, pulmonary htn, CAD, hypothyroidism, constipation, neurogenic baldder, gerd, scoliosis, and DDD  who presents for an urgent care visit today with c/o sore throat and blisters to the mouth. Started last night.   Painful to eat and drink.     States she first noticed blisters to the bottom lip and around mouth 5-6 days ago. Last night started with sores to the tongue and throat.     +chills, no fever.     Past Medical History:  Past Medical History:   Diagnosis Date    Allergy     Anxiety     Arthritis     Carotid artery occlusion     Cataract     Depression     Edema     chronic    Fever blister     Hypothyroid     Iron deficiency anemia     Joint pain     Lumbar radiculopathy     with chronic pain    Ocular migraine     Sleep apnea     cpap       Home Medications:  Prior to Admission medications    Medication Sig Start Date End Date Taking? Authorizing Provider   alprazolam (XANAX XR) 0.5 MG Tb24 Take 1 tablet (0.5 mg total) by mouth 2 (two) times daily. 6/4/17 7/4/17 Yes Mesfin Hodges II, MD   atorvastatin (LIPITOR) 80 MG tablet Take 1 tablet (80 mg total) by mouth once daily.  Patient taking differently: Take 80 mg by mouth every evening.  12/15/16 12/15/17 Yes Mesfin Hodges II, MD   BUTALBITAL/ASPIRIN/CAFFEINE (FIORINAL ORAL) Take by mouth as needed.   Yes Historical Provider, MD   doxycycline (MONODOX) 100 MG capsule  6/13/17  Yes Historical Provider, MD   fluoxetine (PROZAC) 20 MG capsule Take 2 capsules (40 mg total) by mouth once daily. 4/26/17  Yes Erik Oconnor MD   hydrocodone-acetaminophen 10-325mg (NORCO)  mg Tab Take 1 tablet by mouth every 6 (six) hours as needed. 11/8/16  Yes Alayna Hernandez MD   hyoscyamine  (ANASPAZ,LEVSIN) 0.125 mg Tab TAKE 1 TABLET (125 MCG TOTAL) BY MOUTH EVERY 6 (SIX) HOURS AS NEEDED. 5/1/17  Yes Shireen Mayo MD   levothyroxine (SYNTHROID) 112 MCG tablet Take 1 tablet (112 mcg total) by mouth before breakfast. 5/18/17  Yes Mesfin Hodges II, MD   lidocaine-prilocaine (EMLA) cream  4/3/17  Yes Historical Provider, MD   pantoprazole (PROTONIX) 40 MG tablet Take 1 tablet (40 mg total) by mouth once daily. 11/8/16  Yes Mesfin Hodges II, MD   polyethylene glycol (GLYCOLAX) 17 gram PwPk Take 17 g by mouth 2 (two) times daily. 2/3/17  Yes Tomer Puckett PA-C   ranitidine (ZANTAC) 150 MG capsule Take 150 mg by mouth 2 (two) times daily.   Yes Historical Provider, MD   trazodone (DESYREL) 50 MG tablet Take 3 tablets (150 mg total) by mouth every evening. 5/4/17 5/4/18 Yes Mesfin Hodges II, MD   VOLTAREN 1 % Gel  4/3/17  Yes Historical Provider, MD   acetaminophen (TYLENOL) 500 MG tablet Take 500 mg by mouth every 6 (six) hours as needed for Pain.    Historical Provider, MD   aspirin (ECOTRIN) 81 MG EC tablet Take 1 tablet (81 mg total) by mouth once daily. 1/7/16 5/18/17  Tomer Douglas MD   azithromycin (Z-MILTON) 250 MG tablet Take 1 tablet (250 mg total) by mouth once daily. Take first 2 tablets together, then 1 every day until finished. 5/20/17   Gwen Delgado NP   DOC-Q-LACE 100 mg capsule TAKE ONE CAPSULE BY MOUTH THREE TIMES DAILY AS NEEDED FOR CONSTIPATION 4/19/17   Mesfin Hodges II, MD   ondansetron (ZOFRAN) 8 MG tablet Take 1 tablet (8 mg total) by mouth every 12 (twelve) hours as needed for Nausea. 5/18/17   Mesfin Hodges II, MD   oxybutynin (DITROPAN XL) 15 MG TR24 Take 1 tablet (15 mg total) by mouth once daily. 6/12/17   Shireen Mayo MD   SENNOSIDES (SENNA LAX ORAL) Take by mouth as needed.    Historical Provider, MD   lamotrigine (LAMICTAL) 25 MG tablet Take 1 tablet (25 mg total) by mouth once daily. 5/3/13 3/23/17  Kimberley Chandra MD       Review of  "Systems:  Review of Systems   HENT: Positive for congestion, mouth sores, sinus pressure and sore throat. Negative for dental problem, drooling, ear pain and rhinorrhea.    Respiratory: Positive for cough (alot of phlegm that is difficult to expectorate ). Negative for shortness of breath and wheezing.    Cardiovascular: Negative for chest pain and palpitations.       Health Maintainence:   Immunizations:  Health Maintenance       Date Due Completion Date    Colonoscopy 07/17/2013 7/17/2008    Zoster Vaccine 08/25/2016 ---    Lipid Panel 03/15/2017 3/15/2016    Influenza Vaccine 08/01/2017 11/28/2016 (Declined)    Override on 11/28/2016: Declined    Override on 11/14/2014: Done    Mammogram 11/28/2017 11/28/2016 (Declined)    Override on 11/28/2016: Declined (per md note)    TETANUS VACCINE 03/02/2027 3/2/2017           PHYSICAL EXAM     /64 (BP Location: Left arm, Patient Position: Sitting, BP Method: Manual)   Pulse (!) 47   Ht 5' 4" (1.626 m)   LMP  (LMP Unknown)   SpO2 97%   BMI 25.73 kg/m²     Physical Exam   Constitutional: She appears well-developed and well-nourished.   HENT:   Head: Normocephalic and atraumatic.   Mouth/Throat: Uvula is midline, oropharynx is clear and moist and mucous membranes are normal. Oral lesions present. Tonsils are 0 on the right. Tonsils are 0 on the left. No tonsillar exudate.       Eyes: Pupils are equal, round, and reactive to light.   Neck: Normal range of motion. Neck supple. No tracheal deviation present. No thyromegaly present.   Cardiovascular: Normal rate and regular rhythm.    Pulmonary/Chest: Effort normal and breath sounds normal.   Lymphadenopathy:     She has cervical adenopathy.   Vitals reviewed.      LABS     Lab Results   Component Value Date    HGBA1C 5.4 08/25/2016     CMP  Sodium   Date Value Ref Range Status   05/14/2017 140 136 - 145 mmol/L Final     Potassium   Date Value Ref Range Status   05/14/2017 3.9 3.5 - 5.1 mmol/L Final     Chloride "   Date Value Ref Range Status   05/14/2017 101 95 - 110 mmol/L Final     CO2   Date Value Ref Range Status   05/14/2017 31 (H) 23 - 29 mmol/L Final     Glucose   Date Value Ref Range Status   05/14/2017 83 70 - 110 mg/dL Final     BUN, Bld   Date Value Ref Range Status   05/14/2017 8 6 - 20 mg/dL Final     Creatinine   Date Value Ref Range Status   05/14/2017 0.7 0.5 - 1.4 mg/dL Final     Calcium   Date Value Ref Range Status   05/14/2017 9.0 8.7 - 10.5 mg/dL Final     Total Protein   Date Value Ref Range Status   05/14/2017 6.7 6.0 - 8.4 g/dL Final     Albumin   Date Value Ref Range Status   05/14/2017 3.5 3.5 - 5.2 g/dL Final     Total Bilirubin   Date Value Ref Range Status   05/14/2017 0.5 0.1 - 1.0 mg/dL Final     Comment:     For infants and newborns, interpretation of results should be based  on gestational age, weight and in agreement with clinical  observations.  Premature Infant recommended reference ranges:  Up to 24 hours.............<8.0 mg/dL  Up to 48 hours............<12.0 mg/dL  3-5 days..................<15.0 mg/dL  6-29 days.................<15.0 mg/dL       Alkaline Phosphatase   Date Value Ref Range Status   05/14/2017 113 55 - 135 U/L Final     AST   Date Value Ref Range Status   05/14/2017 11 10 - 40 U/L Final     ALT   Date Value Ref Range Status   05/14/2017 11 10 - 44 U/L Final     Anion Gap   Date Value Ref Range Status   05/14/2017 8 8 - 16 mmol/L Final     eGFR if    Date Value Ref Range Status   05/14/2017 >60 >60 mL/min/1.73 m^2 Final     eGFR if non    Date Value Ref Range Status   05/14/2017 >60 >60 mL/min/1.73 m^2 Final     Comment:     Calculation used to obtain the estimated glomerular filtration  rate (eGFR) is the CKD-EPI equation. Since race is unknown   in our information system, the eGFR values for   -American and Non--American patients are given   for each creatinine result.       Lab Results   Component Value Date    WBC 5.04  05/14/2017    HGB 10.3 (L) 05/14/2017    HCT 32.1 (L) 05/14/2017    MCV 84 05/14/2017     05/14/2017     Lab Results   Component Value Date    CHOL 108 (L) 03/15/2016    CHOL 191 01/07/2016    CHOL 154 10/01/2015     Lab Results   Component Value Date    HDL 49 03/15/2016    HDL 80 (H) 01/07/2016    HDL 49 10/01/2015     Lab Results   Component Value Date    LDLCALC 46.4 (L) 03/15/2016    LDLCALC 104.2 01/07/2016    LDLCALC 87.4 10/01/2015     Lab Results   Component Value Date    TRIG 63 03/15/2016    TRIG 34 01/07/2016    TRIG 88 10/01/2015     Lab Results   Component Value Date    CHOLHDL 45.4 03/15/2016    CHOLHDL 41.9 01/07/2016    CHOLHDL 31.8 10/01/2015     Lab Results   Component Value Date    TSH 21.389 (H) 05/14/2017       ASSESSMENT/PLAN     Tasha Hawley is a 61 y.o. female with  Past Medical History:   Diagnosis Date    Allergy     Anxiety     Arthritis     Carotid artery occlusion     Cataract     Depression     Edema     chronic    Fever blister     Hypothyroid     Iron deficiency anemia     Joint pain     Lumbar radiculopathy     with chronic pain    Ocular migraine     Sleep apnea     cpap     Herpangina- with + sick contact. Will start acyclovir and magic mouth wash as needed.   -     (Magic mouthwash) 1:1:1 Benadryl 12.5mg/5ml liq, aluminum & magnesium hydroxide-simehticone (Maalox), lidocaine viscous 2%; Swish and spit 5 mLs every 4 (four) hours as needed. for mouth sores  Dispense: 90 mL; Refill: 0    Herpes simplex  -     acyclovir (ZOVIRAX) 400 MG tablet; Take 1 tablet (400 mg total) by mouth 3 (three) times daily.  Dispense: 21 tablet; Refill: 0    Oral lesion  -     acyclovir (ZOVIRAX) 400 MG tablet; Take 1 tablet (400 mg total) by mouth 3 (three) times daily.  Dispense: 21 tablet; Refill: 0  -     (Magic mouthwash) 1:1:1 Benadryl 12.5mg/5ml liq, aluminum & magnesium hydroxide-simehticone (Maalox), lidocaine viscous 2%; Swish and spit 5 mLs every 4 (four) hours as  needed. for mouth sores  Dispense: 90 mL; Refill: 0    Painful swallowing  -     THROAT SCREEN, RAPID          Follow up as needed     Patient education provided from Martha. Patient was counseled on when and how to seek emergent care.       Cat PALENCIA, DIAN, FNP-c   Department of Internal Medicine - [facility]  4:06 PM

## 2017-06-16 NOTE — TELEPHONE ENCOUNTER
----- Message from Nancy Lama sent at 6/16/2017 12:59 PM CDT -----  Contact: [facility]/[phone]  RX request - refill or new RX.  Is this a refill or new RX:  Refill   RX name and strength: fluticasone (FLONASE) 50 mcg/actuation nasal spray  Directions:  spray by Each Nare route 2 (two) times daily as needed for Rhinitis  Is this a 30 day or 90 day RX:    Pharmacy name and phone #: [id number] [phone]   Comments:  Please call and advise        Thank you

## 2017-06-17 LAB — BACTERIA THROAT CULT: NORMAL

## 2017-06-19 ENCOUNTER — TELEPHONE (OUTPATIENT)
Dept: INTERNAL MEDICINE | Facility: CLINIC | Age: 62
End: 2017-06-19

## 2017-06-19 ENCOUNTER — TELEPHONE (OUTPATIENT)
Dept: GASTROENTEROLOGY | Facility: CLINIC | Age: 62
End: 2017-06-19

## 2017-06-19 RX ORDER — FLUTICASONE PROPIONATE 50 MCG
1 SPRAY, SUSPENSION (ML) NASAL 2 TIMES DAILY PRN
Qty: 16 G | Refills: 5 | Status: SHIPPED | OUTPATIENT
Start: 2017-06-19 | End: 2017-06-20 | Stop reason: SDUPTHER

## 2017-06-19 NOTE — TELEPHONE ENCOUNTER
Patient states she has been having muscle spasm in her legs and is causing a lot of pain. She said her pian management doctor wanted you to prescribe something for muscle pain because he wants you to keep an eye on her kidneys per patient. Please advise

## 2017-06-19 NOTE — TELEPHONE ENCOUNTER
----- Message from Prince Vance MD sent at 6/17/2017  5:01 PM CDT -----  Contact: Self- [phone]  If no BM in 2 weeks she should see her primary care MD or ER or try enema over the counter.  You can offer her at 10:30am clinic apt with me Tuesday it looks open.  ----- Message -----  From: Padmini Simon MA  Sent: 6/16/2017   8:24 AM  To: Prince Vance MD    She has an appointment with Lisa on 6/20 but is having trouble swallowing solids and liquids.  In addition, she has not had a BM in 2 weeks.  Stated she needs something for her bowels.  ----- Message -----  From: Loren Lopez  Sent: 6/16/2017   8:18 AM  To: Pinky Vance- pt called to speak with Maria Esther - still having trouble swallowing- please call pt back at [phone]

## 2017-06-19 NOTE — TELEPHONE ENCOUNTER
Patient stated she is feeling better.  She had a BM over the weekend.  She cannot come in tomorrow because she does not have a ride.  The appointment she has scheduled with NP she would like to reschedule to next week.  She asked that an appointment be scheduled and mailed.

## 2017-06-19 NOTE — TELEPHONE ENCOUNTER
----- Message from Nancy Lama sent at 6/19/2017 11:53 AM CDT -----  Contact: Self/[phone] home  Pt said that she is calling in regards to needing to speak with the nurse( pt did not want to say what in regards to). Please call and advise                Thank you

## 2017-06-20 RX ORDER — FLUTICASONE PROPIONATE 50 MCG
1 SPRAY, SUSPENSION (ML) NASAL 2 TIMES DAILY PRN
Qty: 16 G | Refills: 5 | Status: SHIPPED | OUTPATIENT
Start: 2017-06-20 | End: 2017-07-04

## 2017-06-20 NOTE — TELEPHONE ENCOUNTER
Please call her at home.  I have been clear with her that I will not provide narcotics to her.  She'll have to maintain her pain clinic relationship to continue that prescription.    She has chronic medical conditions and the only safe pain medication for her outside of narcotics  is tylenol.  If she wishes to discuss other options, she'll need an appointment to do that.    Thanks.  D

## 2017-06-20 NOTE — TELEPHONE ENCOUNTER
----- Message from Karla Barboza sent at 6/20/2017  4:19 PM CDT -----  Contact: Naz/Baldemar meehan/[phone]      RX request - refill or new RX.  Is this a refill or new RX:  Refill  RX name and strength: fluticasone (FLONASE) 50 mcg/actuation nasal   Directions:   Is this a 30 day or 90 day RX:    Pharmacy name and phone #:    [id number] - [facility] [address] [facility] [facility] # [phone]     Comments: Pharmacy has been trying to the get the medication 06/15/17. Please call and advise.       Thank you

## 2017-06-22 ENCOUNTER — PATIENT MESSAGE (OUTPATIENT)
Dept: UROLOGY | Facility: CLINIC | Age: 62
End: 2017-06-22

## 2017-07-03 ENCOUNTER — TELEPHONE (OUTPATIENT)
Dept: UROLOGY | Facility: CLINIC | Age: 62
End: 2017-07-03

## 2017-07-03 NOTE — TELEPHONE ENCOUNTER
----- Message from Elida Batista NP sent at 7/3/2017  1:33 PM CDT -----  Contact: [phone]  Needs to drink plenty of water and elevate her legs.  Legs being swollen has nothing to do with her bladder.   Her renal function is normal.      ----- Message -----  From: Vivian Dong LPN  Sent: 7/3/2017   1:23 PM  To: Elida Batista NP        ----- Message -----  From: Karoline Land MA  Sent: 7/3/2017  12:20 PM  To: Lester Weber    [phone]  Pt states that urine in her bag is very dark and her legs are swollen.

## 2017-07-06 ENCOUNTER — TELEPHONE (OUTPATIENT)
Dept: UROLOGY | Facility: CLINIC | Age: 62
End: 2017-07-06

## 2017-07-06 NOTE — LETTER
July 6, 2017    Tasha Hawley  [address]  [place] [address]             [facility] U[facility] 4th Floor  [address]  [facility] [address]  Phone: [phone] To [facility]:    The patient needs to have the suprapubic tube changed with a 20 Fr catheter, 10 or 30 cc balloon.  Fill the balloon to 30 ml with sterile water.  You may go and flush the catheter as needed if it is not draining.  If the flushing is difficult, then change the catheter to a new 20 Fr catheter.      The standing order is only for patient with symptoms of UTI, i.e. Fever, chills or suprapubic tenderness, leakage around the catheter in a properly draining catheter.  If she is symptomatic, change the suprapubic tube to a 20 Fr catheter, obtain urine from the new tube and send for culture.   If there is sediment in the bag, change the catheter.        If you have any questions or concerns, please don't hesitate to call.    Sincerely,        Shireen Mayo MD

## 2017-07-06 NOTE — TELEPHONE ENCOUNTER
----- Message from Sherrie Modi LPN sent at 7/6/2017  1:48 PM CDT -----  Contact:  Liz mike/[facility]    x [phone]      ----- Message -----  From: Kalina Cohn MA  Sent: 7/6/2017   1:38 PM  To: [facility] Staff    States she is calling for a standing order for catheter changes and if she can get a standing order for urine if they have sediment, cloudy or blood in the bag.    Please fax the order for both.    Fax#  [phone]

## 2017-07-07 ENCOUNTER — HOSPITAL ENCOUNTER (EMERGENCY)
Facility: HOSPITAL | Age: 62
Discharge: HOME | End: 2017-07-08
Attending: EMERGENCY MEDICINE
Payer: MEDICARE

## 2017-07-07 DIAGNOSIS — N39.0 CHRONIC UTI (URINARY TRACT INFECTION): Primary | ICD-10-CM

## 2017-07-07 DIAGNOSIS — Z96.89 S/P INSERTION OF SPINAL CORD STIMULATOR: ICD-10-CM

## 2017-07-07 DIAGNOSIS — G89.4 CHRONIC PAIN SYNDROME: ICD-10-CM

## 2017-07-07 DIAGNOSIS — Z43.5 ENCOUNTER FOR SUPRAPUBIC CATHETER CARE: ICD-10-CM

## 2017-07-07 DIAGNOSIS — N31.9 NEUROGENIC BLADDER: ICD-10-CM

## 2017-07-07 LAB
ALBUMIN SERPL BCP-MCNC: 3.5 G/DL
ALP SERPL-CCNC: 123 U/L
ALT SERPL W/O P-5'-P-CCNC: 13 U/L
ANION GAP SERPL CALC-SCNC: 6 MMOL/L
AST SERPL-CCNC: 17 U/L
BACTERIA #/AREA URNS HPF: ABNORMAL /HPF
BASOPHILS # BLD AUTO: 0 K/UL
BASOPHILS NFR BLD: 0 %
BILIRUB SERPL-MCNC: 0.4 MG/DL
BILIRUB UR QL STRIP: NEGATIVE
BUN SERPL-MCNC: 8 MG/DL
CALCIUM SERPL-MCNC: 9.1 MG/DL
CHLORIDE SERPL-SCNC: 103 MMOL/L
CLARITY UR: CLEAR
CO2 SERPL-SCNC: 30 MMOL/L
COLOR UR: YELLOW
CREAT SERPL-MCNC: 0.8 MG/DL
DIFFERENTIAL METHOD: ABNORMAL
EOSINOPHIL # BLD AUTO: 0.2 K/UL
EOSINOPHIL NFR BLD: 5.5 %
ERYTHROCYTE [DISTWIDTH] IN BLOOD BY AUTOMATED COUNT: 14.4 %
EST. GFR  (AFRICAN AMERICAN): >60 ML/MIN/1.73 M^2
EST. GFR  (NON AFRICAN AMERICAN): >60 ML/MIN/1.73 M^2
GLUCOSE SERPL-MCNC: 88 MG/DL
GLUCOSE UR QL STRIP: NEGATIVE
HCT VFR BLD AUTO: 33.6 %
HGB BLD-MCNC: 10.5 G/DL
HGB UR QL STRIP: ABNORMAL
KETONES UR QL STRIP: NEGATIVE
LACTATE SERPL-SCNC: 0.5 MMOL/L
LEUKOCYTE ESTERASE UR QL STRIP: ABNORMAL
LYMPHOCYTES # BLD AUTO: 1.6 K/UL
LYMPHOCYTES NFR BLD: 37.9 %
MCH RBC QN AUTO: 27 PG
MCHC RBC AUTO-ENTMCNC: 31.3 %
MCV RBC AUTO: 86 FL
MICROSCOPIC COMMENT: ABNORMAL
MONOCYTES # BLD AUTO: 0.3 K/UL
MONOCYTES NFR BLD: 7.9 %
NEUTROPHILS # BLD AUTO: 2 K/UL
NEUTROPHILS NFR BLD: 48.5 %
NITRITE UR QL STRIP: NEGATIVE
PH UR STRIP: 8 [PH] (ref 5–8)
PLATELET # BLD AUTO: 207 K/UL
PMV BLD AUTO: 9.7 FL
POTASSIUM SERPL-SCNC: 4 MMOL/L
PROT SERPL-MCNC: 7.1 G/DL
PROT UR QL STRIP: NEGATIVE
RBC # BLD AUTO: 3.89 M/UL
RBC #/AREA URNS HPF: 2 /HPF (ref 0–4)
SODIUM SERPL-SCNC: 139 MMOL/L
SP GR UR STRIP: 1.01 (ref 1–1.03)
URN SPEC COLLECT METH UR: ABNORMAL
UROBILINOGEN UR STRIP-ACNC: NEGATIVE EU/DL
WBC # BLD AUTO: 4.17 K/UL
WBC #/AREA URNS HPF: 15 /HPF (ref 0–5)
WBC CLUMPS URNS QL MICRO: ABNORMAL

## 2017-07-07 PROCEDURE — 84145 PROCALCITONIN (PCT): CPT

## 2017-07-07 PROCEDURE — 63600175 PHARM REV CODE 636 W HCPCS: Performed by: EMERGENCY MEDICINE

## 2017-07-07 PROCEDURE — 96374 THER/PROPH/DIAG INJ IV PUSH: CPT

## 2017-07-07 PROCEDURE — 83605 ASSAY OF LACTIC ACID: CPT

## 2017-07-07 PROCEDURE — 81000 URINALYSIS NONAUTO W/SCOPE: CPT

## 2017-07-07 PROCEDURE — 87077 CULTURE AEROBIC IDENTIFY: CPT

## 2017-07-07 PROCEDURE — 87088 URINE BACTERIA CULTURE: CPT

## 2017-07-07 PROCEDURE — 85025 COMPLETE CBC W/AUTO DIFF WBC: CPT

## 2017-07-07 PROCEDURE — 96375 TX/PRO/DX INJ NEW DRUG ADDON: CPT

## 2017-07-07 PROCEDURE — 87186 SC STD MICRODIL/AGAR DIL: CPT

## 2017-07-07 PROCEDURE — 87086 URINE CULTURE/COLONY COUNT: CPT

## 2017-07-07 PROCEDURE — 80053 COMPREHEN METABOLIC PANEL: CPT

## 2017-07-07 PROCEDURE — 99284 EMERGENCY DEPT VISIT MOD MDM: CPT

## 2017-07-07 RX ORDER — HYDROMORPHONE HYDROCHLORIDE 1 MG/ML
1 INJECTION, SOLUTION INTRAMUSCULAR; INTRAVENOUS; SUBCUTANEOUS
Status: COMPLETED | OUTPATIENT
Start: 2017-07-07 | End: 2017-07-07

## 2017-07-07 RX ORDER — DIPHENHYDRAMINE HYDROCHLORIDE 50 MG/ML
25 INJECTION INTRAMUSCULAR; INTRAVENOUS
Status: COMPLETED | OUTPATIENT
Start: 2017-07-07 | End: 2017-07-07

## 2017-07-07 RX ADMIN — HYDROMORPHONE HYDROCHLORIDE 1 MG: 1 INJECTION, SOLUTION INTRAMUSCULAR; INTRAVENOUS; SUBCUTANEOUS at 10:07

## 2017-07-07 RX ADMIN — DIPHENHYDRAMINE HYDROCHLORIDE 25 MG: 50 INJECTION, SOLUTION INTRAMUSCULAR; INTRAVENOUS at 10:07

## 2017-07-07 NOTE — TELEPHONE ENCOUNTER
Patient was called by the nurse at my direction and told the patient to go to the ER as she called at 4:30 pm on a Friday afternoon.  The patient refused.  She stated that she had back pain (this is chronic) if the tube is encrusted and she feels like she is infected, she needs to go in.  She stated that she would call on Monday if she still feels bad.

## 2017-07-07 NOTE — TELEPHONE ENCOUNTER
----- Message from Sherrie Moid LPN sent at 7/7/2017  4:42 PM CDT -----  Contact: Pt:[phone]      ----- Message -----  From: Waldo Winters  Sent: 7/7/2017   4:30 PM  To: Maria Esther [facility]yer Staff    Pt called and states she is having issues with s/p tub. Pt states she having some issues around her cite pt states she has some fluids. Pt states the tube is caked up pt states she is in severe pain. Pt thinks she has an infection.

## 2017-07-08 VITALS
OXYGEN SATURATION: 96 % | SYSTOLIC BLOOD PRESSURE: 122 MMHG | HEART RATE: 49 BPM | BODY MASS INDEX: 26.46 KG/M2 | DIASTOLIC BLOOD PRESSURE: 65 MMHG | TEMPERATURE: 98 F | RESPIRATION RATE: 20 BRPM | HEIGHT: 64 IN | WEIGHT: 155 LBS

## 2017-07-08 LAB — PROCALCITONIN SERPL IA-MCNC: <0.02 NG/ML

## 2017-07-08 PROCEDURE — 25500020 PHARM REV CODE 255: Performed by: EMERGENCY MEDICINE

## 2017-07-08 RX ORDER — NITROFURANTOIN 25; 75 MG/1; MG/1
100 CAPSULE ORAL 2 TIMES DAILY
Qty: 20 CAPSULE | Refills: 0 | Status: SHIPPED | OUTPATIENT
Start: 2017-07-08 | End: 2017-07-18

## 2017-07-08 RX ORDER — CLINDAMYCIN HYDROCHLORIDE 150 MG/1
300 CAPSULE ORAL 4 TIMES DAILY
Qty: 40 CAPSULE | Refills: 0 | Status: SHIPPED | OUTPATIENT
Start: 2017-07-08 | End: 2017-07-13

## 2017-07-08 RX ADMIN — IOHEXOL 75 ML: 350 INJECTION, SOLUTION INTRAVENOUS at 12:07

## 2017-07-08 RX ADMIN — IOHEXOL 15 ML: 350 INJECTION, SOLUTION INTRAVENOUS at 12:07

## 2017-07-08 NOTE — ED TRIAGE NOTES
60 Y/O F with CC of chills. Pt reports she has a suprapubic catheter and is worried about infection. Complains of pain around site. Some redness noted. No other complaints verbalized. Pt bradycardic at baseline. Will continue to monitor.

## 2017-07-08 NOTE — ED TRIAGE NOTES
Pt. Care assumed, pt. Awake, alert and oriented. Pt. Denies c/o pain or discomfort at this time. Pt. Is requesting to have her supra pubic catheter replaced. Pt. And spouse states it has been in for over 4 weeks. Some redness noted around site, no drainage noted. Yellow urine draining in meadows bag. Will continue to monitor.

## 2017-07-08 NOTE — ED NOTES
Pt. Returned to room, pt. C/o pain returned to abdomen and is requesting pain medication. MD monroy.

## 2017-07-08 NOTE — ED PROVIDER NOTES
"Encounter Date: 7/7/2017       History     Chief Complaint   Patient presents with    Chills     pt has suprapubic catheter in place. states "I think it is infected". reports having chills     Tasha Hawley is a 61 year  female with significant past medical history of CAD, CHF, chronic back pain and neurogenic bladder with suprapubic catheter, GERD, Iron Deficiency Anemia, Anxiety and Depression. She experienced a work related injury about 20 years ago which resulted in multiple back surgeries and she has a SQ Dilaudid Pain Pump due to the back pain that is completely under the skin and dosage cannot be monitored or adjusted without special equipment. It is management by Dr. Hillman, Pain Management. Her PCP is Dr. Mesfin Hodges and her urologist is Dr. Mary Griffin. She presents to the emergency room with concerns about infection to the skin surrounding the suprapubic site.  She reports increased drainage from the are and pain.          Review of patient's allergies indicates:   Allergen Reactions    Imitrex [sumatriptan succinate] Shortness Of Breath    Penicillins Shortness Of Breath     Other reaction(s): Jittery    Topamax [topiramate] Shortness Of Breath    Vancomycin Shortness Of Breath     Rash    (d)-limonene flavor      Other reaction(s): difficult intubation  Other reaction(s): Jittery  Other reaction(s): Difficulty breathing  Other reaction(s): Difficulty breathing  Other reaction(s): Jittery  Other reaction(s): Difficulty breathing    Bactrim [sulfamethoxazole-trimethoprim] Nausea And Vomiting     Other reaction(s): Resp Depression  Other reaction(s): Anaphylaxis    Butorphanol tartrate     Darvocet a500 [propoxyphene n-acetaminophen]      Other reaction(s): Jittery  Other reaction(s): Difficulty breathing    Fentanyl      Other reaction(s): Vomiting  Other reaction(s): Nausea  Other reaction(s): Itching  swelling    Phenytoin sodium extended      pATIENT DENIES EVER HAVING THIS " MEDICATION    White petrolatum-zinc     Zinc oxide-white petrolatum      Other reaction(s): Difficulty breathing    Latex, natural rubber Itching and Rash     Past Medical History:   Diagnosis Date    Allergy     Anxiety     Arthritis     Carotid artery occlusion     Cataract     Depression     Edema     chronic    Fever blister     Hypothyroid     Iron deficiency anemia     Joint pain     Lumbar radiculopathy     with chronic pain    Ocular migraine     Sleep apnea     cpap     Past Surgical History:   Procedure Laterality Date    BACK SURGERY      x 12    CATARACT EXTRACTION W/  INTRAOCULAR LENS IMPLANT Left     Dr Coleman     HYSTERECTOMY  1975    endometriosis    pain pump placement      SPINE SURGERY  5-13-13    CERVICAL FUSION     Family History   Problem Relation Age of Onset    Cancer Mother 55     breast    Cancer Father      esophagus,had laryngectomy    Parkinsonism Maternal Grandmother     Tremor Maternal Grandmother     No Known Problems Brother     No Known Problems Brother     Heart disease Maternal Uncle     No Known Problems Sister     No Known Problems Maternal Aunt     No Known Problems Paternal Aunt     No Known Problems Paternal Uncle     No Known Problems Maternal Grandfather     No Known Problems Paternal Grandmother     No Known Problems Paternal Grandfather     Melanoma Neg Hx     Amblyopia Neg Hx     Blindness Neg Hx     Cataracts Neg Hx     Diabetes Neg Hx     Glaucoma Neg Hx     Hypertension Neg Hx     Macular degeneration Neg Hx     Retinal detachment Neg Hx     Strabismus Neg Hx     Stroke Neg Hx     Thyroid disease Neg Hx      Social History   Substance Use Topics    Smoking status: Never Smoker    Smokeless tobacco: Never Used    Alcohol use No      Comment: denies     Review of Systems   Constitutional: Negative for fever.   HENT: Negative for sore throat.    Respiratory: Negative for shortness of breath.    Cardiovascular: Negative  for chest pain.   Gastrointestinal: Negative for nausea.   Genitourinary: Negative for dysuria.   Musculoskeletal: Negative for back pain.   Skin: Negative for rash.   Neurological: Negative for weakness.   Hematological: Does not bruise/bleed easily.   All other systems reviewed and are negative.      Physical Exam     Initial Vitals [07/07/17 1954]   BP Pulse Resp Temp SpO2   (!) 102/46 (!) 42 13 97.5 °F (36.4 °C) 96 %      MAP       64.67         Physical Exam    Constitutional: Vital signs are normal. She appears well-developed and well-nourished. She is cooperative. She has a sickly appearance.   HENT:   Head: Normocephalic and atraumatic.   Eyes: Conjunctivae, EOM and lids are normal.   Neck: Trachea normal and normal range of motion. Neck supple. No stridor present. No tracheal deviation present. No neck rigidity. No Brudzinski's sign and no Kernig's sign noted.   Cardiovascular: Regular rhythm and normal pulses. Bradycardia present.    Pulmonary/Chest: Effort normal.   Abdominal: Soft. Normal appearance and bowel sounds are normal. She exhibits no abdominal bruit. There is tenderness. There is no rebound.   Suprapubic catheter is in place to the lower midline    There is a metallic pain pump palpable to the right lower quadrant   Neurological: She is alert and oriented to person, place, and time. She has normal strength. Gait normal. GCS eye subscore is 4. GCS verbal subscore is 5. GCS motor subscore is 6.   Neurological function of lower extremity is at baseline according to the patient   Skin: Skin is warm, dry and intact. Capillary refill takes less than 2 seconds.   Psychiatric: She has a normal mood and affect. Her behavior is normal. Judgment normal. Thought content is not delusional. She expresses no homicidal and no suicidal ideation.         ED Course   Procedures  Labs Reviewed - No data to display              - none    Vitals:    07/07/17 1954 07/07/17 2055 07/07/17 2220 07/07/17 2257   BP: (!)  "102/46 (!) 117/56  138/63   Pulse: (!) 42 (!) 43 (!) 54 (!) 48   Resp: 13 15  16   Temp: 97.5 °F (36.4 °C) 97.8 °F (36.6 °C)  97.7 °F (36.5 °C)   TempSrc: Oral Oral  Oral   SpO2: 96% 100%  99%   Weight: 70.3 kg (155 lb)      Height: 5' 4" (1.626 m)          Results for orders placed or performed during the hospital encounter of 07/07/17   CBC auto differential   Result Value Ref Range    WBC 4.17 3.90 - 12.70 K/uL    RBC 3.89 (L) 4.00 - 5.40 M/uL    Hemoglobin 10.5 (L) 12.0 - 16.0 g/dL    Hematocrit 33.6 (L) 37.0 - 48.5 %    MCV 86 82 - 98 fL    MCH 27.0 27.0 - 31.0 pg    MCHC 31.3 (L) 32.0 - 36.0 %    RDW 14.4 11.5 - 14.5 %    Platelets 207 150 - 350 K/uL    MPV 9.7 9.2 - 12.9 fL    Gran # 2.0 1.8 - 7.7 K/uL    Lymph # 1.6 1.0 - 4.8 K/uL    Mono # 0.3 0.3 - 1.0 K/uL    Eos # 0.2 0.0 - 0.5 K/uL    Baso # 0.00 0.00 - 0.20 K/uL    Gran% 48.5 38.0 - 73.0 %    Lymph% 37.9 18.0 - 48.0 %    Mono% 7.9 4.0 - 15.0 %    Eosinophil% 5.5 0.0 - 8.0 %    Basophil% 0.0 0.0 - 1.9 %    Differential Method Automated    Comprehensive metabolic panel   Result Value Ref Range    Sodium 139 136 - 145 mmol/L    Potassium 4.0 3.5 - 5.1 mmol/L    Chloride 103 95 - 110 mmol/L    CO2 30 (H) 23 - 29 mmol/L    Glucose 88 70 - 110 mg/dL    BUN, Bld 8 6 - 20 mg/dL    Creatinine 0.8 0.5 - 1.4 mg/dL    Calcium 9.1 8.7 - 10.5 mg/dL    Total Protein 7.1 6.0 - 8.4 g/dL    Albumin 3.5 3.5 - 5.2 g/dL    Total Bilirubin 0.4 0.1 - 1.0 mg/dL    Alkaline Phosphatase 123 55 - 135 U/L    AST 17 10 - 40 U/L    ALT 13 10 - 44 U/L    Anion Gap 6 (L) 8 - 16 mmol/L    eGFR if African American >60 >60 mL/min/1.73 m^2    eGFR if non African American >60 >60 mL/min/1.73 m^2   Urinalysis - Cath.   Result Value Ref Range    Specimen UA Urine, Catheterized     Color, UA Yellow Yellow, Straw, [place]    Appearance, UA Clear Clear    pH, UA 8.0 5.0 - 8.0    Specific Gravity, UA 1.010 1.005 - 1.030    Protein, UA Negative Negative    Glucose, UA Negative Negative    Ketones, " UA Negative Negative    Bilirubin (UA) Negative Negative    Occult Blood UA Trace (A) Negative    Nitrite, UA Negative Negative    Urobilinogen, UA Negative <2.0 EU/dL    Leukocytes, UA 2+ (A) Negative   Lactic acid, plasma   Result Value Ref Range    Lactate (Lactic Acid) 0.5 0.5 - 2.2 mmol/L   Urinalysis Microscopic   Result Value Ref Range    RBC, UA 2 0 - 4 /hpf    WBC, UA 15 (H) 0 - 5 /hpf    WBC Clumps, UA Few (A) None-Rare    Bacteria, UA Few (A) None-Occ /hpf    Microscopic Comment SEE COMMENT      *Note: Due to a large number of results and/or encounters for the requested time period, some results have not been displayed. A complete set of results can be found in Results Review.        Imaging Results          CT Abdomen Pelvis With Contrast (Final result)  Result time 07/08/17 00:28:33    Final result by Enmanuel Noble MD (07/08/17 00:28:33)                 Impression:       No evidence of drainable collection in the abdomen or pelvis.  No drainable collection along the tract of the suprapubic catheter.    Suprapubic catheter tip within the urinary bladder without evidence of contrast extravasation.  Wall thickening of the urinary bladder, most likely from chronic stasis.  Component of chronic cystitis is suspected.    Stable appearance of large hiatal hernia.                Electronically signed by: ENMANUEL NOBLE MD  Date:     07/08/17  Time:    00:28              Narrative:    Exam: [id number]  07/07/17  23:59:27 [id number] ([facility]) : CT ABDOMEN PELVIS WITH CONTRAST    Technique:    Axial CT Scan of the abdomen and pelvis was performed from the lung base to the public symphysis after the intravenous administration of  75 cc of Omni 350. Coronal and Sagittal reformats were obtained. Delayed images were also obtained    Comparison:    12/07/2016    Findings:      There are chronic interstitial changes in the lung bases.  The heart is normal in appearance.  The vessels are unremarkable.  There is no evidence of  lymphadenopathy.    There is a stable appearance of a large hiatal hernia with air fluid levels within the distal esophagus.  The reminder of the stomach is within normal limits.  The small bowel loops are unremarkable.  The appendix is not conclusively identified.  There are no secondary findings of acute appendicitis.  The large bowel is unremarkable.    The liver, gallbladder, and biliary system are within normal limits.  The spleen is unremarkable.  There is fatty atrophy of the pancreas.  The adrenal glands are within normal limits.    The kidneys are unremarkable.  There is prompt excretion of contrast from the collecting system.  There is a suprapubic catheter in place.  Previous Motley catheter has been removed.  Injection of contrast through suprapubic catheter opacifies the urinary bladder.  There is wall thickening of the urinary bladder.  No contrast extravasation is identified.  No drainable collection is noted along the suprapubic catheter tract.    There is no evidence of free fluid in the abdomen or pelvis.  No drainable collection is identified.  There is no evidence of pneumatosis.    There are extensive postoperative changes in the visualized lumbar spine.  There is an intrathecal lead with the tip in a stable position.  Advanced degenerative changes are present in the osseous structures.      There are postoperative changes in the anterior abdominal wall as described above with a suprapubic catheter in place.  There are stable enlarged bilateral inguinal lymph nodes.                                 The above test results and vital signs have been reviewed by the physician.  I discussed with patient and/or family/caretaker that evaluation in the ED does not suggest any emergent or life threatening medical conditions requiring immediate intervention beyond what was provided in the ED, and I believe patient is safe for discharge.  Regardless, an unremarkable evaluation in the ED does not preclude the  development or presence of a serious of life threatening condition. As such, patient was instructed to return immediately for any worsening or change in current symptoms.      12:59 AM - Re-evaluation:  The patient is resting comfortably and is in no acute distress. Discussed test results and notified of pending labs. Answered questions at this time.  There may be a slight increased area of irritation in the region surrounding the suprapubic insertion.  I will give her a short course of clindamycin to cover for this        I have not determined a specific etiology for patient's symptoms based on evaluation in the ED, but I have low suspicion for medical, surgical or other life threatening illness and I believe patient is safe for discharge.  With lack of etiology for the patient's complaints, I specifically counseled the patient and/or family/caretaker that despite an unrevealing evaluation in the ED, patient may still be at risk for serious, even life threatening illness.  As such, patient was instructed to return immediately for any worsening or change in current symptoms, or for any concern.              ED Course     Clinical Impression:       ICD-10-CM ICD-9-CM   1. Chronic UTI (urinary tract infection) N39.0 599.0   2. Encounter for suprapubic catheter care Z43.5 V53.6   3. Chronic pain syndrome G89.4 338.4   4. S/P insertion of spinal cord stimulator Z98.890 V45.89   5. Neurogenic bladder N31.9 596.54         Disposition:   Disposition: Discharged  Condition: Stable                        Harish Aldana MD  07/08/17 0115

## 2017-07-10 LAB — BACTERIA UR CULT: NORMAL

## 2017-07-10 NOTE — PROVIDER PROGRESS NOTES - EMERGENCY DEPT.
Encounter Date: 7/7/2017    ED Physician Progress Notes        Physician Note:   07/10/17 10:51: pt was discharged on Macrobid.  Culture is sensitive.  No further f/u needed.  HARSHALM

## 2017-07-12 ENCOUNTER — TELEPHONE (OUTPATIENT)
Dept: GASTROENTEROLOGY | Facility: CLINIC | Age: 62
End: 2017-07-12

## 2017-07-12 ENCOUNTER — TELEPHONE (OUTPATIENT)
Dept: ENDOSCOPY | Facility: HOSPITAL | Age: 62
End: 2017-07-12

## 2017-07-12 ENCOUNTER — TELEPHONE (OUTPATIENT)
Dept: UROLOGY | Facility: CLINIC | Age: 62
End: 2017-07-12

## 2017-07-12 ENCOUNTER — OFFICE VISIT (OUTPATIENT)
Dept: GASTROENTEROLOGY | Facility: CLINIC | Age: 62
End: 2017-07-12
Payer: MEDICARE

## 2017-07-12 ENCOUNTER — TELEPHONE (OUTPATIENT)
Dept: PSYCHIATRY | Facility: CLINIC | Age: 62
End: 2017-07-12

## 2017-07-12 VITALS
HEART RATE: 59 BPM | SYSTOLIC BLOOD PRESSURE: 106 MMHG | WEIGHT: 180.13 LBS | HEIGHT: 64 IN | BODY MASS INDEX: 30.75 KG/M2 | DIASTOLIC BLOOD PRESSURE: 56 MMHG

## 2017-07-12 DIAGNOSIS — G47.00 INSOMNIA, UNSPECIFIED TYPE: ICD-10-CM

## 2017-07-12 DIAGNOSIS — Z98.890 S/P LAPAROSCOPIC FUNDOPLICATION: ICD-10-CM

## 2017-07-12 DIAGNOSIS — R13.14 PHARYNGOESOPHAGEAL DYSPHAGIA: Primary | ICD-10-CM

## 2017-07-12 PROCEDURE — 99214 OFFICE O/P EST MOD 30 MIN: CPT | Mod: S$GLB,,, | Performed by: PHYSICIAN ASSISTANT

## 2017-07-12 PROCEDURE — 99999 PR PBB SHADOW E&M-EST. PATIENT-LVL V: CPT | Mod: PBBFAC,,, | Performed by: PHYSICIAN ASSISTANT

## 2017-07-12 RX ORDER — ALPRAZOLAM 0.5 MG/1
0.5 TABLET ORAL
COMMUNITY
End: 2017-08-09 | Stop reason: SDUPTHER

## 2017-07-12 RX ORDER — TRAZODONE HYDROCHLORIDE 50 MG/1
150 TABLET ORAL NIGHTLY
Qty: 90 TABLET | Refills: 3 | Status: SHIPPED | OUTPATIENT
Start: 2017-07-12 | End: 2017-10-11 | Stop reason: SDUPTHER

## 2017-07-12 RX ORDER — FLUOXETINE HYDROCHLORIDE 20 MG/1
40 CAPSULE ORAL DAILY
Qty: 60 CAPSULE | Refills: 2 | Status: SHIPPED | OUTPATIENT
Start: 2017-07-12 | End: 2017-07-27 | Stop reason: SDUPTHER

## 2017-07-12 RX ORDER — LIDOCAINE HYDROCHLORIDE 20 MG/ML
SOLUTION ORAL; TOPICAL
COMMUNITY
Start: 2017-06-15 | End: 2017-08-21

## 2017-07-12 RX ORDER — OXYCODONE AND ACETAMINOPHEN 10; 325 MG/1; MG/1
TABLET ORAL
COMMUNITY
Start: 2017-06-29 | End: 2017-11-30

## 2017-07-12 RX ORDER — BUTALBITAL, ACETAMINOPHEN, CAFFEINE AND CODEINE PHOSPHATE 50; 325; 40; 30 MG/1; MG/1; MG/1; MG/1
CAPSULE ORAL
COMMUNITY
Start: 2017-06-29 | End: 2017-08-21 | Stop reason: SDUPTHER

## 2017-07-12 NOTE — PROGRESS NOTES
[facility] Consultation Note    Reason for Consult:  The primary encounter diagnosis was Pharyngoesophageal dysphagia. A diagnosis of S/P laparoscopic fundoplication was also pertinent to this visit.    PCP:   Mesfin Hodges Ii       Referring MD:  Aaareferral Self  No address on file    HPI:  This is a 61 y.o. female here for evaluation of dysphagia  She is a Dr of Dr. Vance's    Having dysphagia of solids, liquids, and pills since 1/2017.  + nausea    Currently having abdominal pain she thinks is related her her UTIs associated with her indwelling catheter  Taking protonix once daily as well as Zantac, mylanta    Last EGD 2014 - Nissen fundoplication wrap is tight and        moderate resistance to scope passage. No evidence of a failed wrap        endoscopically. A TTS dilator was passed through the scope. Dilation        with a 15-16.5-18 mm balloon (to a maximum balloon size of 18 mm)        dilator was performed    ROS:  Constitutional: No fevers, chills, No weight loss  ENT: No allergies  CV: No chest pain  Pulm: No cough, No shortness of breath  Ophtho: No vision changes  GI: see HPI  Derm: No rash  Heme: No lymphadenopathy, No bruising  MSK: No arthritis  : No dysuria, No hematuria  Endo: No hot or cold intolerance  Neuro: No syncope, No seizure  Psych: No anxiety, No depression    Medical History:  has a past medical history of Allergy; Anxiety; Arthritis; Carotid artery occlusion; Cataract; Depression; Edema; Fever blister; Hypothyroid; Iron deficiency anemia; Joint pain; Lumbar radiculopathy; Ocular migraine; and Sleep apnea.    Surgical History:  has a past surgical history that includes Hysterectomy (1975); Back surgery; pain pump placement; Spine surgery (5-13-13); and Cataract extraction w/  intraocular lens implant (Left).    Family History: family history includes Cancer in her father; Cancer (age of onset: 55) in her mother; Esophageal cancer in her father; Heart  disease in her maternal uncle; No Known Problems in her brother, brother, maternal aunt, maternal grandfather, paternal aunt, paternal grandfather, paternal grandmother, paternal uncle, and sister; Parkinsonism in her maternal grandmother; Tremor in her maternal grandmother..     Social History:  reports that she has never smoked. She has never used smokeless tobacco. She reports that she does not drink alcohol or use drugs.    Review of patient's allergies indicates:   Allergen Reactions    Imitrex [sumatriptan succinate] Shortness Of Breath    Penicillins Shortness Of Breath     Other reaction(s): Jittery    Topamax [topiramate] Shortness Of Breath    Vancomycin Shortness Of Breath     Rash    (d)-limonene flavor      Other reaction(s): difficult intubation  Other reaction(s): Jittery  Other reaction(s): Difficulty breathing  Other reaction(s): Difficulty breathing  Other reaction(s): Jittery  Other reaction(s): Difficulty breathing    Bactrim [sulfamethoxazole-trimethoprim] Nausea And Vomiting     Other reaction(s): Resp Depression  Other reaction(s): Anaphylaxis    Butorphanol tartrate     Darvocet a500 [propoxyphene n-acetaminophen]      Other reaction(s): Jittery  Other reaction(s): Difficulty breathing    Fentanyl      Other reaction(s): Vomiting  Other reaction(s): Nausea  Other reaction(s): Itching  swelling    Phenytoin sodium extended      pATIENT DENIES EVER HAVING THIS MEDICATION    White petrolatum-zinc     Zinc oxide-white petrolatum      Other reaction(s): Difficulty breathing    Latex, natural rubber Itching and Rash       Current Outpatient Prescriptions on File Prior to Visit   Medication Sig Dispense Refill    (Magic mouthwash) 1:1:1 Benadryl 12.5mg/5ml liq, aluminum & magnesium hydroxide-simehticone (Maalox), lidocaine viscous 2% Swish and spit 5 mLs every 4 (four) hours as needed. for mouth sores 90 mL 0    acetaminophen (TYLENOL) 500 MG tablet Take 500 mg by mouth every 6 (six)  hours as needed for Pain.      acyclovir (ZOVIRAX) 400 MG tablet Take 1 tablet (400 mg total) by mouth 3 (three) times daily. 21 tablet 0    aspirin (ECOTRIN) 81 MG EC tablet Take 1 tablet (81 mg total) by mouth once daily.  0    atorvastatin (LIPITOR) 80 MG tablet Take 1 tablet (80 mg total) by mouth once daily. (Patient taking differently: Take 80 mg by mouth every evening. ) 90 tablet 3    azithromycin (Z-MILTON) 250 MG tablet Take 1 tablet (250 mg total) by mouth once daily. Take first 2 tablets together, then 1 every day until finished. 6 tablet 0    BUTALBITAL/ASPIRIN/CAFFEINE (FIORINAL ORAL) Take by mouth as needed.      clindamycin (CLEOCIN) 150 MG capsule Take 2 capsules (300 mg total) by mouth 4 (four) times daily. 40 capsule 0    DOC-Q-LACE 100 mg capsule TAKE ONE CAPSULE BY MOUTH THREE TIMES DAILY AS NEEDED FOR CONSTIPATION 90 capsule 2    doxycycline (MONODOX) 100 MG capsule       hydrocodone-acetaminophen 10-325mg (NORCO)  mg Tab Take 1 tablet by mouth every 6 (six) hours as needed. 61 tablet 0    hyoscyamine (ANASPAZ,LEVSIN) 0.125 mg Tab TAKE 1 TABLET (125 MCG TOTAL) BY MOUTH EVERY 6 (SIX) HOURS AS NEEDED. 120 tablet 0    levothyroxine (SYNTHROID) 112 MCG tablet Take 1 tablet (112 mcg total) by mouth before breakfast. 90 tablet 3    lidocaine-prilocaine (EMLA) cream       nitrofurantoin, macrocrystal-monohydrate, (MACROBID) 100 MG capsule Take 1 capsule (100 mg total) by mouth 2 (two) times daily. 20 capsule 0    ondansetron (ZOFRAN) 8 MG tablet Take 1 tablet (8 mg total) by mouth every 12 (twelve) hours as needed for Nausea. 60 tablet 3    oxybutynin (DITROPAN XL) 15 MG TR24 Take 1 tablet (15 mg total) by mouth once daily. 30 tablet 6    pantoprazole (PROTONIX) 40 MG tablet Take 1 tablet (40 mg total) by mouth once daily. 90 tablet 3    polyethylene glycol (GLYCOLAX) 17 gram PwPk Take 17 g by mouth 2 (two) times daily. 30 packet 5    ranitidine (ZANTAC) 150 MG capsule Take 150 mg  "by mouth 2 (two) times daily.      SENNOSIDES (SENNA LAX ORAL) Take by mouth as needed.      VOLTAREN 1 % Gel       [DISCONTINUED] fluoxetine (PROZAC) 20 MG capsule Take 2 capsules (40 mg total) by mouth once daily. 60 capsule 2    [DISCONTINUED] lamotrigine (LAMICTAL) 25 MG tablet Take 1 tablet (25 mg total) by mouth once daily. 30 tablet 6    [DISCONTINUED] trazodone (DESYREL) 50 MG tablet Take 3 tablets (150 mg total) by mouth every evening. 90 tablet 3     No current facility-administered medications on file prior to visit.          Objective Findings:    Vital Signs:  BP (!) 106/56   Pulse (!) 59   Ht 5' 4" (1.626 m)   Wt 81.7 kg (180 lb 1.9 oz)   LMP  (LMP Unknown)   BMI 30.92 kg/m²   Body mass index is 30.92 kg/m².    Physical Exam:  General Appearance: Well appearing in no acute distress. She is ambulating  A wheelchair  Head:   Normocephalic, without obvious abnormality  Eyes:    No scleral icterus  ENT: Neck supple, Lips, mucosa, and tongue normal  Lungs: CTA bilaterally in anterior and posterior fields, no wheezes, no crackles.  Heart:  Regular rate and rhythm, S1, S2 normal, no murmurs heard  Abdomen: Soft, diffuse abdominal tenderness, non distended with positive bowel sounds in all four quadrants. Extremities: no edema  Skin: No rash  Neurologic: AAO x 3      Labs:  Lab Results   Component Value Date    WBC 4.17 07/07/2017    HGB 10.5 (L) 07/07/2017    HCT 33.6 (L) 07/07/2017     07/07/2017    CHOL 108 (L) 03/15/2016    TRIG 63 03/15/2016    HDL 49 03/15/2016    ALT 13 07/07/2017    AST 17 07/07/2017     07/07/2017    K 4.0 07/07/2017     07/07/2017    CREATININE 0.8 07/07/2017    BUN 8 07/07/2017    CO2 30 (H) 07/07/2017    TSH 21.389 (H) 05/14/2017    INR 0.9 05/27/2014    HGBA1C 5.4 08/25/2016       Imaging:    Endoscopy:    Last EGD 2014 - Nissen fundoplication wrap is tight and        moderate resistance to scope passage. No evidence of a failed wrap        " endoscopically. A TTS dilator was passed through the scope. Dilation        with a 15-16.5-18 mm balloon (to a maximum balloon size of 18 mm)        dilator was performed    Assessment:  1. Pharyngoesophageal dysphagia    2. S/P laparoscopic fundoplication       62yo F with dysphagia of solids and liquids x 6-7 months  Recommendations:  1. Schedule EGD with dilation of fundoplication or esophagus at endoscopist discretion    Return in about 3 months (around 10/12/2017).      Order summary:  Orders Placed This Encounter    Case request GI: ESOPHAGOGASTRODUODENOSCOPY (EGD)         Thank you so much for allowing me to participate in the care of Tasha Gleason PA-C

## 2017-07-12 NOTE — TELEPHONE ENCOUNTER
Review of the chart reveals that she had Macrobid sent in to which the e coli was sensitive.  Patient went to the ER over the weekend for suprapubic tenderness, bladder overactivity. (She has a suprapubic tube in place._)

## 2017-07-12 NOTE — TELEPHONE ENCOUNTER
She is having a difficult time ambulating because of leg edema.  She is dealing with her medical issues.  No complaints about the Prozac or trazodone.

## 2017-07-12 NOTE — TELEPHONE ENCOUNTER
----- Message from Kalina Cohn MA sent at 7/11/2017  4:42 PM CDT -----  Contact: self  [phone]  States she is having problems swallowing.

## 2017-07-12 NOTE — TELEPHONE ENCOUNTER
----- Message from Shashank Polk sent at 7/11/2017  4:22 PM CDT -----  Contact: Self [phone]  Pt would like a call back to see if she can have a phone interview during her appt time on 7/12/17?  Pt ask if you can please call to let her know she she can determine if she has to come in to see you.

## 2017-07-12 NOTE — TELEPHONE ENCOUNTER
----- Message from Kalina Cohn MA sent at 7/11/2017  4:42 PM CDT -----  Contact: self  [phone]  States she is having problems swallowing.

## 2017-07-14 ENCOUNTER — TELEPHONE (OUTPATIENT)
Dept: UROLOGY | Facility: CLINIC | Age: 62
End: 2017-07-14

## 2017-07-14 ENCOUNTER — TELEPHONE (OUTPATIENT)
Dept: INTERNAL MEDICINE | Facility: CLINIC | Age: 62
End: 2017-07-14

## 2017-07-14 ENCOUNTER — TELEPHONE (OUTPATIENT)
Dept: PSYCHIATRY | Facility: CLINIC | Age: 62
End: 2017-07-14

## 2017-07-14 NOTE — TELEPHONE ENCOUNTER
----- Message from Laine Veliz sent at 7/14/2017  1:01 PM CDT -----  Contact: self/[phone]  Pt called in regards to talking to the dr about having anxiety attach.        Please advise

## 2017-07-14 NOTE — TELEPHONE ENCOUNTER
----- Message from Kalina Cohn MA sent at 7/14/2017  9:45 AM CDT -----  Contact: self  [phone]  States she is with very bad spasms in her uterus area.  States the medications for the spasms is not helping and she needs to know what to do.  States she needs different medication for the spasms.

## 2017-07-14 NOTE — TELEPHONE ENCOUNTER
----- Message from Erik Oconnor MD sent at 7/14/2017  4:04 PM CDT -----  Contact: pt  Would you let her know, I'll call her tomorrow.   [facility]  ----- Message -----  From: Gita Hernandez MA  Sent: 7/14/2017   1:05 PM  To: Erik Oconnor MD    [phone]    Pt would like you to call he concerning her stress and anxiety. Would like to discuss medication options with you

## 2017-07-15 ENCOUNTER — TELEPHONE (OUTPATIENT)
Dept: PSYCHIATRY | Facility: CLINIC | Age: 62
End: 2017-07-15

## 2017-07-15 NOTE — TELEPHONE ENCOUNTER
----- Message from Gita Hernandez MA sent at 7/14/2017  1:05 PM CDT -----  Contact: pt  [phone]    Pt would like you to call he concerning her stress and anxiety. Would like to discuss medication options with you

## 2017-07-15 NOTE — TELEPHONE ENCOUNTER
She is having difficulty functioning.  For example, day before yesterday she beame frustrated over the bed not being made properly.

## 2017-07-20 ENCOUNTER — TELEPHONE (OUTPATIENT)
Dept: INTERNAL MEDICINE | Facility: CLINIC | Age: 62
End: 2017-07-20

## 2017-07-20 NOTE — TELEPHONE ENCOUNTER
Spoke with huong dowd case manager over at [facility]..pt and ot has been authorized

## 2017-07-21 ENCOUNTER — TELEPHONE (OUTPATIENT)
Dept: ENDOSCOPY | Facility: HOSPITAL | Age: 62
End: 2017-07-21

## 2017-07-21 ENCOUNTER — ANESTHESIA EVENT (OUTPATIENT)
Dept: ENDOSCOPY | Facility: HOSPITAL | Age: 62
End: 2017-07-21
Payer: MEDICARE

## 2017-07-21 ENCOUNTER — SURGERY (OUTPATIENT)
Age: 62
End: 2017-07-21

## 2017-07-21 ENCOUNTER — HOSPITAL ENCOUNTER (OUTPATIENT)
Facility: HOSPITAL | Age: 62
Discharge: HOME | End: 2017-07-21
Attending: INTERNAL MEDICINE | Admitting: INTERNAL MEDICINE
Payer: MEDICARE

## 2017-07-21 ENCOUNTER — ANESTHESIA (OUTPATIENT)
Dept: ENDOSCOPY | Facility: HOSPITAL | Age: 62
End: 2017-07-21
Payer: MEDICARE

## 2017-07-21 VITALS
TEMPERATURE: 98 F | DIASTOLIC BLOOD PRESSURE: 58 MMHG | BODY MASS INDEX: 29.02 KG/M2 | HEART RATE: 52 BPM | HEIGHT: 64 IN | RESPIRATION RATE: 17 BRPM | RESPIRATION RATE: 18 BRPM | SYSTOLIC BLOOD PRESSURE: 135 MMHG | WEIGHT: 170 LBS | OXYGEN SATURATION: 97 %

## 2017-07-21 DIAGNOSIS — R13.10 DYSPHAGIA: ICD-10-CM

## 2017-07-21 PROCEDURE — 37000009 HC ANESTHESIA EA ADD 15 MINS: Performed by: INTERNAL MEDICINE

## 2017-07-21 PROCEDURE — 43249 ESOPH EGD DILATION <30 MM: CPT | Performed by: INTERNAL MEDICINE

## 2017-07-21 PROCEDURE — 25000003 PHARM REV CODE 250: Performed by: NURSE ANESTHETIST, CERTIFIED REGISTERED

## 2017-07-21 PROCEDURE — D9220A PRA ANESTHESIA: Mod: CRNA,,, | Performed by: NURSE ANESTHETIST, CERTIFIED REGISTERED

## 2017-07-21 PROCEDURE — 37000008 HC ANESTHESIA 1ST 15 MINUTES: Performed by: INTERNAL MEDICINE

## 2017-07-21 PROCEDURE — 43249 ESOPH EGD DILATION <30 MM: CPT | Mod: ,,, | Performed by: INTERNAL MEDICINE

## 2017-07-21 PROCEDURE — 63600175 PHARM REV CODE 636 W HCPCS: Performed by: NURSE ANESTHETIST, CERTIFIED REGISTERED

## 2017-07-21 PROCEDURE — 25000003 PHARM REV CODE 250: Performed by: INTERNAL MEDICINE

## 2017-07-21 PROCEDURE — C1726 CATH, BAL DIL, NON-VASCULAR: HCPCS | Performed by: INTERNAL MEDICINE

## 2017-07-21 PROCEDURE — D9220A PRA ANESTHESIA: Mod: ANES,,, | Performed by: ANESTHESIOLOGY

## 2017-07-21 RX ORDER — PROPOFOL 10 MG/ML
VIAL (ML) INTRAVENOUS
Status: DISCONTINUED | OUTPATIENT
Start: 2017-07-21 | End: 2017-07-21

## 2017-07-21 RX ORDER — SODIUM CHLORIDE 9 MG/ML
INJECTION, SOLUTION INTRAVENOUS CONTINUOUS
Status: DISCONTINUED | OUTPATIENT
Start: 2017-07-21 | End: 2017-07-21 | Stop reason: HOSPADM

## 2017-07-21 RX ORDER — LIDOCAINE HCL/PF 100 MG/5ML
SYRINGE (ML) INTRAVENOUS
Status: DISCONTINUED | OUTPATIENT
Start: 2017-07-21 | End: 2017-07-21

## 2017-07-21 RX ORDER — MIDAZOLAM HYDROCHLORIDE 1 MG/ML
INJECTION, SOLUTION INTRAMUSCULAR; INTRAVENOUS
Status: DISCONTINUED | OUTPATIENT
Start: 2017-07-21 | End: 2017-07-21

## 2017-07-21 RX ORDER — PROPOFOL 10 MG/ML
VIAL (ML) INTRAVENOUS CONTINUOUS PRN
Status: DISCONTINUED | OUTPATIENT
Start: 2017-07-21 | End: 2017-07-21

## 2017-07-21 RX ADMIN — PROPOFOL 50 MG: 10 INJECTION, EMULSION INTRAVENOUS at 04:07

## 2017-07-21 RX ADMIN — SODIUM CHLORIDE: 0.9 INJECTION, SOLUTION INTRAVENOUS at 04:07

## 2017-07-21 RX ADMIN — LIDOCAINE HYDROCHLORIDE 100 MG: 20 INJECTION, SOLUTION INTRAVENOUS at 04:07

## 2017-07-21 RX ADMIN — PROPOFOL 150 MCG/KG/MIN: 10 INJECTION, EMULSION INTRAVENOUS at 04:07

## 2017-07-21 RX ADMIN — MIDAZOLAM HYDROCHLORIDE 2 MG: 1 INJECTION, SOLUTION INTRAMUSCULAR; INTRAVENOUS at 04:07

## 2017-07-21 NOTE — H&P
[facility]  History & Physical    Subjective:      Chief Complaint/Reason for Admission:     EGD    Tasha Hawley is a 61 y.o. female.    Past Medical History:   Diagnosis Date    Allergy     Anxiety     Arthritis     Carotid artery occlusion     Cataract     Depression     Edema     chronic    Fever blister     Hypothyroid     Iron deficiency anemia     Joint pain     Lumbar radiculopathy     with chronic pain    Ocular migraine     Sleep apnea     cpap     Past Surgical History:   Procedure Laterality Date    BACK SURGERY      x 12    CATARACT EXTRACTION W/  INTRAOCULAR LENS IMPLANT Left     Dr Coleman     HYSTERECTOMY  1975    endometriosis    pain pump placement      SPINE SURGERY  5-13-13    CERVICAL FUSION     Family History   Problem Relation Age of Onset    Cancer Mother 55     breast    Cancer Father      esophagus,had laryngectomy    Esophageal cancer Father     Parkinsonism Maternal Grandmother     Tremor Maternal Grandmother     No Known Problems Brother     No Known Problems Brother     Heart disease Maternal Uncle     No Known Problems Sister     No Known Problems Maternal Aunt     No Known Problems Paternal Aunt     No Known Problems Paternal Uncle     No Known Problems Maternal Grandfather     No Known Problems Paternal Grandmother     No Known Problems Paternal Grandfather     Melanoma Neg Hx     Amblyopia Neg Hx     Blindness Neg Hx     Cataracts Neg Hx     Diabetes Neg Hx     Glaucoma Neg Hx     Hypertension Neg Hx     Macular degeneration Neg Hx     Retinal detachment Neg Hx     Strabismus Neg Hx     Stroke Neg Hx     Thyroid disease Neg Hx     Colon cancer Neg Hx      Social History   Substance Use Topics    Smoking status: Never Smoker    Smokeless tobacco: Never Used    Alcohol use No      Comment: denies       PTA Medications   Medication Sig    alprazolam (XANAX) 0.5 MG tablet Take 0.5 mg by mouth.    atorvastatin  (LIPITOR) 80 MG tablet Take 1 tablet (80 mg total) by mouth once daily. (Patient taking differently: Take 80 mg by mouth every evening. )    BUTALBITAL/ASPIRIN/CAFFEINE (FIORINAL ORAL) Take by mouth as needed.    DOC-Q-LACE 100 mg capsule TAKE ONE CAPSULE BY MOUTH THREE TIMES DAILY AS NEEDED FOR CONSTIPATION    fluoxetine (PROZAC) 20 MG capsule Take 2 capsules (40 mg total) by mouth once daily.    hydrocodone-acetaminophen 10-325mg (NORCO)  mg Tab Take 1 tablet by mouth every 6 (six) hours as needed.    hyoscyamine (ANASPAZ,LEVSIN) 0.125 mg Tab TAKE 1 TABLET (125 MCG TOTAL) BY MOUTH EVERY 6 (SIX) HOURS AS NEEDED.    levothyroxine (SYNTHROID) 112 MCG tablet Take 1 tablet (112 mcg total) by mouth before breakfast.    ondansetron (ZOFRAN) 8 MG tablet Take 1 tablet (8 mg total) by mouth every 12 (twelve) hours as needed for Nausea.    oxybutynin (DITROPAN XL) 15 MG TR24 Take 1 tablet (15 mg total) by mouth once daily.    oxycodone-acetaminophen (PERCOCET)  mg per tablet     pantoprazole (PROTONIX) 40 MG tablet Take 1 tablet (40 mg total) by mouth once daily.    polyethylene glycol (GLYCOLAX) 17 gram PwPk Take 17 g by mouth 2 (two) times daily.    SENNOSIDES (SENNA LAX ORAL) Take by mouth as needed.    trazodone (DESYREL) 50 MG tablet Take 3 tablets (150 mg total) by mouth every evening.    VOLTAREN 1 % Gel     (Magic mouthwash) 1:1:1 Benadryl 12.5mg/5ml liq, aluminum & magnesium hydroxide-simehticone (Maalox), lidocaine viscous 2% Swish and spit 5 mLs every 4 (four) hours as needed. for mouth sores    acetaminophen (TYLENOL) 500 MG tablet Take 500 mg by mouth every 6 (six) hours as needed for Pain.    acyclovir (ZOVIRAX) 400 MG tablet Take 1 tablet (400 mg total) by mouth 3 (three) times daily.    aspirin (ECOTRIN) 81 MG EC tablet Take 1 tablet (81 mg total) by mouth once daily.    azithromycin (Z-MILTON) 250 MG tablet Take 1 tablet (250 mg total) by mouth once daily. Take first 2 tablets  together, then 1 every day until finished.    butalbital-acetaminop-caf-cod -54-30 mg Cap     doxycycline (MONODOX) 100 MG capsule     LIDOCAINE VISCOUS 2 % solution     lidocaine-prilocaine (EMLA) cream     ranitidine (ZANTAC) 150 MG capsule Take 150 mg by mouth 2 (two) times daily.     Review of patient's allergies indicates:   Allergen Reactions    Imitrex [sumatriptan succinate] Shortness Of Breath    Penicillins Shortness Of Breath     Other reaction(s): Jittery    Topamax [topiramate] Shortness Of Breath    Vancomycin Shortness Of Breath     Rash    (d)-limonene flavor      Other reaction(s): difficult intubation  Other reaction(s): Jittery  Other reaction(s): Difficulty breathing  Other reaction(s): Difficulty breathing  Other reaction(s): Jittery  Other reaction(s): Difficulty breathing    Bactrim [sulfamethoxazole-trimethoprim] Nausea And Vomiting     Other reaction(s): Resp Depression  Other reaction(s): Anaphylaxis    Butorphanol tartrate     Darvocet a500 [propoxyphene n-acetaminophen]      Other reaction(s): Jittery  Other reaction(s): Difficulty breathing    Fentanyl      Other reaction(s): Vomiting  Other reaction(s): Nausea  Other reaction(s): Itching  swelling    Phenytoin sodium extended      pATIENT DENIES EVER HAVING THIS MEDICATION    White petrolatum-zinc     Zinc oxide-white petrolatum      Other reaction(s): Difficulty breathing    Latex, natural rubber Itching and Rash        Review of Systems   Constitutional: Negative for chills and fever.   Respiratory: Negative for shortness of breath and wheezing.    Cardiovascular: Negative for chest pain.   Gastrointestinal: Negative for abdominal pain, blood in stool, constipation, diarrhea and melena.       Objective:      Vital Signs (Most Recent)  Temp: 97.9 °F (36.6 °C) (07/21/17 1520)  Pulse: (!) 45 (07/21/17 1520)  Resp: 12 (07/21/17 1520)  BP: (!) 110/56 (07/21/17 1520)  SpO2: 97 % (07/21/17 1520)    Vital Signs Range  (Last 24H):  Temp:  [97.9 °F (36.6 °C)]   Pulse:  [45]   Resp:  [12]   BP: (110)/(56)   SpO2:  [97 %]     Physical Exam   Constitutional: She is oriented to person, place, and time. She appears well-developed and well-nourished.   Cardiovascular: Normal rate.    Pulmonary/Chest: Effort normal and breath sounds normal.   Abdominal: Soft. Bowel sounds are normal.   Neurological: She is alert and oriented to person, place, and time.   Skin: Skin is warm and dry.   Psychiatric: She has a normal mood and affect. Her behavior is normal. Judgment and thought content normal.           Assessment:      Active Hospital Problems    Diagnosis  POA    Dysphagia [R13.10]  Yes      Resolved Hospital Problems    Diagnosis Date Resolved POA   No resolved problems to display.       Plan:    EGD for dysphagia.

## 2017-07-21 NOTE — PATIENT INSTRUCTIONS
Discharge Summary/Instructions after an Endoscopic Procedure  Patient Name: Tasha Hawley  Patient MRN: [id number]  Patient YOB: 1955 Friday, July 21, 2017  Prince Vance MD  RESTRICTIONS ON ACTIVITY:  - DO NOT drive a car, operate machinery, make legal/financial decisions, or   drink alcohol until the day after the procedure.    - The following day: return to full activity including work, except no heavy   lifting, straining or running for 3 days if polyps were removed.  - Diet: Eat and drink normally unless instructed otherwise.  TREATMENT FOR COMMON SIDE EFFECTS:  - Mild abdominal pain, bloating or excessive gas: rest, eat lightly and use   a heating pad.  - Sore Throat - treat with throat lozenges. Gargle with warm salt water.  SYMPTOMS TO WATCH FOR AND REPORT TO YOUR PHYSICIAN:  1. Severe abdominal pain or bloating.  2. Pain in chest.  3. Chills or fever occurring within 24 hours after a procedure.  4. A large amount of rectal bleeding, which would show as bright red,   maroon, or black stools. (A small amount of blood from the rectum is not   serious, especially if hemorrhoids are present.)  5. Because air was used during the procedure, expelling large amounts of air   from your rectum or belching is normal.  6. If a bowel prep was taken, you may not have a bowel movement for 1-3   days.  This is normal.  7. Go directly to the emergency room if you notice any of the following:   Chills and/or fever over 101 F   Persistent vomiting   Severe abdominal pain, other than gas cramps   Severe chest pain   Black, tarry stools   Any bleeding - exceeding one tablespoon  Your doctor recommends these additional instructions:  If any biopsies were performed, my office will call you in 5 to 6 business   days with any results.  You are being discharged to home.   Return to your GI clinic.   The findings and recommendations have been discussed with you.   Your physician has recommended a repeat upper  endoscopy as needed per   protocol.   Return to your physician assistant.  For questions, problems or results please call your physician - Prince Vance MD at Work:  [phone].  [facility], EMERGENCY ROOM PHONE NUMBER: [phone]  IF A COMPLICATION OR EMERGENCY SITUATION ARISES AND YOU ARE UNABLE TO REACH   YOUR PHYSICIAN - GO TO THE EMERGENCY ROOM.  Prince Vance MD  7/21/2017 4:37:49 PM  This report has been verified and signed electronically.

## 2017-07-21 NOTE — ANESTHESIA PREPROCEDURE EVALUATION
07/21/2017  Tasha Hawley is a 61 y.o., female.    Anesthesia Evaluation    I have reviewed the Patient Summary Reports.    I have reviewed the Nursing Notes.      Review of Systems  Anesthesia Hx:  No problems with previous Anesthesia    Cardiovascular:   CAD      Pulmonary:   Sleep Apnea    Hepatic/GI:   GERD    Musculoskeletal:   Arthritis     Neurological:   Neuromuscular Disease, Headaches    Endocrine:   Hypothyroidism    Psych:   Psychiatric History          Physical Exam  General:  Well nourished, Obesity    Airway/Jaw/Neck:  Airway Findings: Mouth Opening: Normal Tongue: Normal  General Airway Assessment: Adult  Mallampati: II  Improves to II with phonation.  TM Distance: Normal, at least 6 cm  Jaw/Neck Findings:  Neck ROM: Normal ROM     Eyes/Ears/Nose:  EYES/EARS/NOSE FINDINGS: Normal   Dental:  Dental Findings: Upper Dentures   Chest/Lungs:  Chest/Lungs Findings:    Heart/Vascular:  Heart Findings: Heart murmur: negative       Mental Status:  Mental Status Findings: Normal        Anesthesia Plan  Type of Anesthesia, risks & benefits discussed:  Anesthesia Type:  general  Patient's Preference: general  Intra-op Monitoring Plan: standard ASA monitors  Intra-op Monitoring Plan Comments:   Post Op Pain Control Plan:   Post Op Pain Control Plan Comments:   Induction:   IV  Beta Blocker:  Patient is not currently on a Beta-Blocker (No further documentation required).       Informed Consent: Patient understands risks and agrees with Anesthesia plan.  Questions answered. Anesthesia consent signed with patient.  ASA Score: 3     Day of Surgery Review of History & Physical:    H&P update referred to the surgeon.         Ready For Surgery From Anesthesia Perspective.

## 2017-07-21 NOTE — TRANSFER OF CARE
"Anesthesia Transfer of Care Note    Patient: Tasha Hawley    Procedure(s) Performed: Procedure(s) (LRB):  ESOPHAGOGASTRODUODENOSCOPY (EGD) (N/A)    Patient location: GI    Anesthesia Type: general    Transport from OR: Transported from OR on room air with adequate spontaneous ventilation    Post pain: adequate analgesia    Post assessment: no apparent anesthetic complications    Post vital signs: stable    Level of consciousness: awake    Nausea/Vomiting: no nausea/vomiting    Complications: none    Transfer of care protocol was followed      Last vitals:   Visit Vitals  BP (!) 107/55 (BP Location: Left arm, Patient Position: Lying, BP Method: Automatic)   Pulse 64   Temp 36.7 °C (98 °F) (Oral)   Resp 18   Ht 5' 4" (1.626 m)   Wt 77.1 kg (170 lb)   LMP  (LMP Unknown)   SpO2 98%   Breastfeeding? No   BMI 29.18 kg/m²     "

## 2017-07-24 NOTE — ANESTHESIA POSTPROCEDURE EVALUATION
"Anesthesia Post Evaluation    Patient: Tasha Hawley    Procedure(s) Performed: Procedure(s) (LRB):  ESOPHAGOGASTRODUODENOSCOPY (EGD) (N/A)    Final Anesthesia Type: general  Patient location during evaluation: GI PACU  Patient participation: Yes- Able to Participate  Level of consciousness: awake and alert, oriented and awake  Post-procedure vital signs: reviewed and stable  Pain management: adequate  Airway patency: patent  PONV status at discharge: No PONV  Anesthetic complications: no      Cardiovascular status: blood pressure returned to baseline and hemodynamically stable  Respiratory status: unassisted, spontaneous ventilation and room air  Hydration status: euvolemic  Follow-up not needed.        Visit Vitals  BP (!) 135/58 (BP Location: Left arm, Patient Position: Lying, BP Method: Automatic)   Pulse (!) 52   Temp 36.7 °C (98 °F) (Oral)   Resp 18   Ht 5' 4" (1.626 m)   Wt 77.1 kg (170 lb)   LMP  (LMP Unknown)   SpO2 97%   Breastfeeding? No   BMI 29.18 kg/m²       Pain/Low Score: No Data Recorded      "

## 2017-07-26 ENCOUNTER — TELEPHONE (OUTPATIENT)
Dept: INTERNAL MEDICINE | Facility: CLINIC | Age: 62
End: 2017-07-26

## 2017-07-26 NOTE — TELEPHONE ENCOUNTER
HOME called to report a low pulse. I reviewed the chart and stated to them that her pulse is always in the lower range. She is asymptomatic. [facility] wants new perimeters for Mrs. Hawley since her pulse is always low. They suggested nothing below 54. Is that ok?

## 2017-07-26 NOTE — TELEPHONE ENCOUNTER
----- Message from Karla Barboza sent at 7/26/2017  2:13 PM CDT -----  Contact: [facility]/[phone]    Type:Home Health(orders,updates,clarifications,etc)    Home Health Agency/Nurse: [facility]    Phone number: [phone]    Reason for call: Report    Comments: Patient was evaluate have a heart rate of 58 which is below of the perimeter. Patient was a symptomatic. Please call and advise.       Thank you!!!

## 2017-07-27 ENCOUNTER — TELEPHONE (OUTPATIENT)
Dept: PSYCHIATRY | Facility: CLINIC | Age: 62
End: 2017-07-27

## 2017-07-27 ENCOUNTER — TELEPHONE (OUTPATIENT)
Dept: INTERNAL MEDICINE | Facility: CLINIC | Age: 62
End: 2017-07-27

## 2017-07-27 RX ORDER — FLUOXETINE HYDROCHLORIDE 20 MG/1
60 CAPSULE ORAL DAILY
Qty: 60 CAPSULE | Refills: 2
Start: 2017-07-27 | End: 2017-10-11

## 2017-07-27 NOTE — TELEPHONE ENCOUNTER
Spoke to patient.  She is having more anxiety and depression.  She has appointment for end of August.  She agreed to increase Prozac to 60 mg in AM.

## 2017-07-27 NOTE — TELEPHONE ENCOUNTER
----- Message from Juan Day MA sent at 7/27/2017  8:40 AM CDT -----  Contact: pt  [phone]    Pt is requesting a call back regarding her anxiety.

## 2017-07-27 NOTE — TELEPHONE ENCOUNTER
----- Message from Chet Batista sent at 7/27/2017  9:00 AM CDT -----  Contact: Self [phone]  The above pt called and stated,  You've told me that if I ever get any changes in the dosage for any of my medication to inform you, my anxiety has been acting up a lot lately, so Dr Erik Oconnor my Psychiatrist has increased my  fluoxetine (PROZAC) 20 MG capsule, stated that she will wait to hear from you before she starts taking the new dose, please call and advice    Thanks

## 2017-07-27 NOTE — TELEPHONE ENCOUNTER
Just an FYI patient wanted to let you know she recently saw her Physiatrist and he upped her prozac to 20 mg a day. Wants to know if you agree. She's been having a lot of anxiety.

## 2017-07-28 ENCOUNTER — TELEPHONE (OUTPATIENT)
Dept: ENDOSCOPY | Facility: HOSPITAL | Age: 62
End: 2017-07-28

## 2017-07-29 ENCOUNTER — NURSE TRIAGE (OUTPATIENT)
Dept: ADMINISTRATIVE | Facility: CLINIC | Age: 62
End: 2017-07-29

## 2017-07-29 NOTE — TELEPHONE ENCOUNTER
"    Reason for Disposition   Patient sounds very sick or weak to the triager    Answer Assessment - Initial Assessment Questions  1. SYMPTOM: "What's the main symptom you're concerned about?" (e.g., pain, itching, dryness)     Vag itch today. Has suprapubic cath- seeing white particles in urine since yest and tube. abd/bladder spasms. Afeb. Chronic back pain, no blood in urine    2. LOCATION: "Where is the  _______ located?" (e.g., inside/outside, left/right)      HX UTI.   3. ONSET: "When did the  ________  start?"     yest   4. PAIN: "Is there any pain?" If so, ask: "How bad is it?" (Scale: 1-10; mild, moderate, severe)     6-7   5. ITCHING: "Is there any itching?" If so, ask: "How bad is it?" (Scale: 1-10; mild, moderate, severe)    Some   6. CAUSE: "What do you think is causing the symptoms?"    UTI   7. OTHER SYMPTOMS: "Do you have any other symptoms?" (e.g., vaginal bleeding, pain with urination)    Less than 300 cc this am. Cath changed once a month- changed two weeks ago, taking plenty of fluids. WC bound    Protocols used: ST URINARY SYMPTOMS-A-AH, ST VAGINAL SYMPTOMS-A-AH  pt states that she is disabled and asking for abx to be called in.  Spoke with MD Terry on call. Rec ED today. Pt notified. Pt states that she cannot get to ED. Call back with questions.   SHANNAN duarte [phone]   LM on MD SALAZAR at 131pm.   "

## 2017-07-29 NOTE — TELEPHONE ENCOUNTER
Reason for Disposition   SEVERE abdominal pain    Protocols used: ST URINARY CATHETER - LUNA - COUDE-A-

## 2017-07-30 NOTE — TELEPHONE ENCOUNTER
Patient refused OOC care advise spoke to Dr. Delacruz states she agrees with OOC care advisement and declines to prescribe medication over the phone. Patient advised/again disagreed to such.

## 2017-07-31 NOTE — TELEPHONE ENCOUNTER
Patient called over the weekend with vaginal itching and discharge. Requesting antibiotic for possible UTI. Patient states she knows it a UTI and can't come in for an appointment. Please advise.

## 2017-08-03 ENCOUNTER — TELEPHONE (OUTPATIENT)
Dept: GASTROENTEROLOGY | Facility: CLINIC | Age: 62
End: 2017-08-03

## 2017-08-03 NOTE — TELEPHONE ENCOUNTER
----- Message from Karoline Land MA sent at 8/3/2017  8:08 AM CDT -----  Contact: Home: [phone]   Pinky Schroeder had a EGD on 7/21 and states that she is still not feeling well.    Home: [phone]

## 2017-08-08 ENCOUNTER — TELEPHONE (OUTPATIENT)
Dept: UROLOGY | Facility: HOSPITAL | Age: 62
End: 2017-08-08

## 2017-08-08 ENCOUNTER — OFFICE VISIT (OUTPATIENT)
Dept: SURGERY | Facility: CLINIC | Age: 62
End: 2017-08-08
Payer: MEDICARE

## 2017-08-08 VITALS
HEART RATE: 49 BPM | DIASTOLIC BLOOD PRESSURE: 59 MMHG | WEIGHT: 170 LBS | TEMPERATURE: 98 F | HEIGHT: 64 IN | BODY MASS INDEX: 29.02 KG/M2 | SYSTOLIC BLOOD PRESSURE: 114 MMHG

## 2017-08-08 DIAGNOSIS — R13.10 DYSPHAGIA, UNSPECIFIED TYPE: Primary | ICD-10-CM

## 2017-08-08 PROCEDURE — 99213 OFFICE O/P EST LOW 20 MIN: CPT | Mod: S$GLB,,, | Performed by: SURGERY

## 2017-08-08 PROCEDURE — 99999 PR PBB SHADOW E&M-EST. PATIENT-LVL III: CPT | Mod: PBBFAC,,, | Performed by: SURGERY

## 2017-08-08 PROCEDURE — 3008F BODY MASS INDEX DOCD: CPT | Mod: S$GLB,,, | Performed by: SURGERY

## 2017-08-08 RX ORDER — ALPRAZOLAM 0.5 MG/1
0.5 TABLET ORAL 2 TIMES DAILY
Qty: 60 TABLET | Refills: 0 | Status: CANCELLED | OUTPATIENT
Start: 2017-08-08

## 2017-08-08 RX ORDER — CIPROFLOXACIN 500 MG/1
TABLET ORAL
COMMUNITY
Start: 2017-07-30 | End: 2017-08-21

## 2017-08-08 RX ORDER — LEVOTHYROXINE SODIUM 125 UG/1
TABLET ORAL
COMMUNITY
Start: 2017-07-17 | End: 2017-08-21 | Stop reason: SDUPTHER

## 2017-08-08 RX ORDER — FLUCONAZOLE 100 MG/1
TABLET ORAL
COMMUNITY
Start: 2017-07-29 | End: 2017-08-21

## 2017-08-08 NOTE — LETTER
[facility] - General Surgery  [address]  [place] [address]  Phone: [phone] August 8, 2017      Mesfin Hodges II, MD  [address]  [place] [address]    Patient: Tasha Hawley   MR Number: [id number]   YOB: 1955   Date of Visit: 8/8/2017     Dear Dr. Hodges:    Thank you for referring Tasha Hawlye to me for evaluation. Below are the relevant portions of my assessment and plan of care.    Patient presents with recurrent hiatal hernia with dysphagia.     PLAN: Will try dilation again.  If she wants to proceed with surgery she will need stress test, UGI, GES and motility study. She wants to think about it.    If you have questions, please do not hesitate to call me. I look forward to following Tasha along with you.    Sincerely,      Kalpesh Llamas MD   Section Head - General, Laparoscopic, Bariatric  Acute Care and Oncologic Surgery   - Surgical Weight Loss Program  [facility]    WSR/penny    CC  Prince Vance MD

## 2017-08-08 NOTE — TELEPHONE ENCOUNTER
----- Message from Sherrie Modi LPN sent at 7/7/2017 10:53 AM CDT -----  Contact: [place] [address] [phone]      ----- Message -----  From: Kalina Cohn MA  Sent: 7/7/2017  10:35 AM  To: Maria Esther Shireen Staff    States she needs to clarify the way the order is written for her catheter changes.  States she needs the catheter changed monthly and it is stating only if she has an infection.    Please call her.

## 2017-08-08 NOTE — PROGRESS NOTES
History & Physical    SUBJECTIVE:     History of Present Illness:  Patient is a 61 y.o. female presents with dysphagia. Large hiatal hernia with a laparoscopic repair with mesh and Nissen fundoplication back in 9/2011.  She is s/p recent dilation without help.  Rice is the worst but she is having trouble with solids and liquids.  She has been losing weight but doesn't know how much.    Chief Complaint   Patient presents with    Consult     swallowing       Review of patient's allergies indicates:   Allergen Reactions    (d)-limonene flavor      Other reaction(s): difficult intubation  Other reaction(s): Difficulty breathing    Bactrim [sulfamethoxazole-trimethoprim] Anaphylaxis    Imitrex [sumatriptan succinate] Shortness Of Breath    Penicillins Shortness Of Breath     Other reaction(s): Jittery    Topamax [topiramate] Shortness Of Breath    Vancomycin Shortness Of Breath     Rash    Butorphanol tartrate     Darvocet a500 [propoxyphene n-acetaminophen]      Other reaction(s): Difficulty breathing    Fentanyl      Other reaction(s): Vomiting  Other reaction(s): Nausea  Other reaction(s): Itching  swelling    White petrolatum-zinc     Zinc oxide-white petrolatum      Other reaction(s): Difficulty breathing    Latex, natural rubber Itching and Rash       Current Outpatient Prescriptions   Medication Sig Dispense Refill    (Magic mouthwash) 1:1:1 Benadryl 12.5mg/5ml liq, aluminum & magnesium hydroxide-simehticone (Maalox), lidocaine viscous 2% Swish and spit 5 mLs every 4 (four) hours as needed. for mouth sores 90 mL 0    acetaminophen (TYLENOL) 500 MG tablet Take 500 mg by mouth every 6 (six) hours as needed for Pain.      acyclovir (ZOVIRAX) 400 MG tablet Take 1 tablet (400 mg total) by mouth 3 (three) times daily. 21 tablet 0    alprazolam (XANAX) 0.5 MG tablet Take 0.5 mg by mouth.      aspirin (ECOTRIN) 81 MG EC tablet Take 1 tablet (81 mg total) by mouth once daily.  0    atorvastatin (LIPITOR)  80 MG tablet Take 1 tablet (80 mg total) by mouth once daily. (Patient taking differently: Take 80 mg by mouth every evening. ) 90 tablet 3    azithromycin (Z-MILTON) 250 MG tablet Take 1 tablet (250 mg total) by mouth once daily. Take first 2 tablets together, then 1 every day until finished. 6 tablet 0    butalbital-acetaminop-caf-cod -62-30 mg Cap       BUTALBITAL/ASPIRIN/CAFFEINE (FIORINAL ORAL) Take by mouth as needed.      ciprofloxacin HCl (CIPRO) 500 MG tablet       DOC-Q-LACE 100 mg capsule TAKE ONE CAPSULE BY MOUTH THREE TIMES DAILY AS NEEDED FOR CONSTIPATION 90 capsule 2    doxycycline (MONODOX) 100 MG capsule       fluconazole (DIFLUCAN) 100 MG tablet       fluoxetine (PROZAC) 20 MG capsule Take 3 capsules (60 mg total) by mouth once daily. 60 capsule 2    hydrocodone-acetaminophen 10-325mg (NORCO)  mg Tab Take 1 tablet by mouth every 6 (six) hours as needed. 61 tablet 0    hyoscyamine (ANASPAZ,LEVSIN) 0.125 mg Tab TAKE 1 TABLET (125 MCG TOTAL) BY MOUTH EVERY 6 (SIX) HOURS AS NEEDED. 120 tablet 0    levothyroxine (SYNTHROID) 112 MCG tablet Take 1 tablet (112 mcg total) by mouth before breakfast. 90 tablet 3    levothyroxine (SYNTHROID) 125 MCG tablet       LIDOCAINE VISCOUS 2 % solution       lidocaine-prilocaine (EMLA) cream       ondansetron (ZOFRAN) 8 MG tablet Take 1 tablet (8 mg total) by mouth every 12 (twelve) hours as needed for Nausea. 60 tablet 3    oxybutynin (DITROPAN XL) 15 MG TR24 Take 1 tablet (15 mg total) by mouth once daily. 30 tablet 6    oxycodone-acetaminophen (PERCOCET)  mg per tablet       pantoprazole (PROTONIX) 40 MG tablet Take 1 tablet (40 mg total) by mouth once daily. 90 tablet 3    polyethylene glycol (GLYCOLAX) 17 gram PwPk Take 17 g by mouth 2 (two) times daily. 30 packet 5    ranitidine (ZANTAC) 150 MG capsule Take 150 mg by mouth 2 (two) times daily.      SENNOSIDES (SENNA LAX ORAL) Take by mouth as needed.      trazodone (DESYREL) 50  MG tablet Take 3 tablets (150 mg total) by mouth every evening. 90 tablet 3    VOLTAREN 1 % Gel        No current facility-administered medications for this visit.        Past Medical History:   Diagnosis Date    Anxiety     Arthritis     Bilateral lower extremity edema     severe chronic    Carotid artery occlusion     Cataract     Depression     Fever blister     Hypothyroid     Iron deficiency anemia     Lumbar radiculopathy     with chronic pain    Ocular migraine     Sleep apnea     cpap     Past Surgical History:   Procedure Laterality Date    BACK SURGERY      x 12    CATARACT EXTRACTION W/  INTRAOCULAR LENS IMPLANT Left     Dr Coleman     HYSTERECTOMY  1975    endometriosis    pain pump placement      SQ Dilaudid Pump managed by Dr. Hillman, Pain Management    SPINAL CORD STIMULATOR REMOVAL      before [place]    SPINE SURGERY  5-13-13    CERVICAL FUSION     Family History   Problem Relation Age of Onset    Cancer Mother 55     breast    Cancer Father      esophagus,had laryngectomy    Esophageal cancer Father     Parkinsonism Maternal Grandmother     Tremor Maternal Grandmother     No Known Problems Brother     No Known Problems Brother     Heart disease Maternal Uncle     No Known Problems Sister     No Known Problems Maternal Aunt     No Known Problems Paternal Aunt     No Known Problems Paternal Uncle     No Known Problems Maternal Grandfather     No Known Problems Paternal Grandmother     No Known Problems Paternal Grandfather     Melanoma Neg Hx     Amblyopia Neg Hx     Blindness Neg Hx     Cataracts Neg Hx     Diabetes Neg Hx     Glaucoma Neg Hx     Hypertension Neg Hx     Macular degeneration Neg Hx     Retinal detachment Neg Hx     Strabismus Neg Hx     Stroke Neg Hx     Thyroid disease Neg Hx     Colon cancer Neg Hx      Social History   Substance Use Topics    Smoking status: Never Smoker    Smokeless tobacco: Never Used    Alcohol use No       "Comment: denies        Review of Systems:  Review of Systems   Constitutional: Negative for unexpected weight change.   Respiratory: Positive for shortness of breath. Negative for cough and chest tightness.    Cardiovascular: Negative for chest pain.   Gastrointestinal: Positive for constipation. Negative for diarrhea, nausea and vomiting.        Has regurgitation due to dysphagia   Genitourinary:        Has incontinence    Hematological: Bruises/bleeds easily.       OBJECTIVE:     Vital Signs (Most Recent)  Temp: 98.3 °F (36.8 °C) (08/08/17 1125)  Pulse: (!) 49 (08/08/17 1125)  BP: (!) 114/59 (08/08/17 1125)  5' 4" (1.626 m)  77.1 kg (170 lb)     Physical Exam:  Physical Exam   Constitutional: She is oriented to person, place, and time. She appears well-developed and well-nourished.   Cardiovascular: Normal rate, regular rhythm and normal heart sounds.    Pulmonary/Chest: Effort normal and breath sounds normal.   Abdominal: Soft. Bowel sounds are normal. There is no tenderness.   Neurological: She is alert and oriented to person, place, and time.   Skin: Skin is warm and dry.   Psychiatric: She has a normal mood and affect. Her behavior is normal. Judgment and thought content normal.   Vitals reviewed.      Laboratory  CBC: Reviewed  CMP: Reviewed    Diagnostic Results:  Egd, ugi, ct: Reviewed    ASSESSMENT/PLAN:     Recurrent hiatal hernia with dysphagia    PLAN:Plan     Will try dilation again.  If she wants to proceed with surgery she will need stress test, ugi, ges and motility study. She wants to think about it.       "

## 2017-08-09 RX ORDER — ALPRAZOLAM 0.5 MG/1
0.5 TABLET ORAL 2 TIMES DAILY PRN
Qty: 60 TABLET | Refills: 0 | Status: SHIPPED | OUTPATIENT
Start: 2017-08-09 | End: 2017-09-15 | Stop reason: SDUPTHER

## 2017-08-14 ENCOUNTER — TELEPHONE (OUTPATIENT)
Dept: ENDOSCOPY | Facility: HOSPITAL | Age: 62
End: 2017-08-14

## 2017-08-20 ENCOUNTER — HOSPITAL ENCOUNTER (INPATIENT)
Facility: HOSPITAL | Age: 62
LOS: 2 days | Discharge: HOME HEALTH | DRG: 699 | End: 2017-08-23
Attending: EMERGENCY MEDICINE | Admitting: HOSPITALIST
Payer: MEDICARE

## 2017-08-20 DIAGNOSIS — N39.0 URINARY TRACT INFECTION ASSOCIATED WITH CATHETERIZATION OF URINARY TRACT, UNSPECIFIED INDWELLING URINARY CATHETER TYPE, INITIAL ENCOUNTER: ICD-10-CM

## 2017-08-20 DIAGNOSIS — R56.9 SEIZURE: Primary | ICD-10-CM

## 2017-08-20 DIAGNOSIS — M47.26 OSTEOARTHRITIS OF SPINE WITH RADICULOPATHY, LUMBAR REGION: Chronic | ICD-10-CM

## 2017-08-20 DIAGNOSIS — T83.511A URINARY TRACT INFECTION ASSOCIATED WITH CATHETERIZATION OF URINARY TRACT, UNSPECIFIED INDWELLING URINARY CATHETER TYPE, INITIAL ENCOUNTER: ICD-10-CM

## 2017-08-20 DIAGNOSIS — R41.82 ALTERED MENTAL STATE: ICD-10-CM

## 2017-08-20 DIAGNOSIS — R55 SYNCOPE, UNSPECIFIED SYNCOPE TYPE: ICD-10-CM

## 2017-08-20 DIAGNOSIS — R55 SYNCOPE: ICD-10-CM

## 2017-08-20 LAB
ALBUMIN SERPL BCP-MCNC: 4 G/DL
ALP SERPL-CCNC: 123 U/L
ALT SERPL W/O P-5'-P-CCNC: 12 U/L
AMMONIA PLAS-SCNC: 25 UMOL/L
AMPHET+METHAMPHET UR QL: NEGATIVE
ANION GAP SERPL CALC-SCNC: 12 MMOL/L
APAP SERPL-MCNC: <3 UG/ML
AST SERPL-CCNC: 14 U/L
BACTERIA #/AREA URNS HPF: ABNORMAL /HPF
BARBITURATES UR QL SCN>200 NG/ML: NEGATIVE
BASOPHILS # BLD AUTO: 0.01 K/UL
BASOPHILS NFR BLD: 0.1 %
BENZODIAZ UR QL SCN>200 NG/ML: NEGATIVE
BILIRUB SERPL-MCNC: 0.8 MG/DL
BILIRUB UR QL STRIP: NEGATIVE
BUN SERPL-MCNC: 8 MG/DL
BZE UR QL SCN: NEGATIVE
CALCIUM SERPL-MCNC: 9.7 MG/DL
CANNABINOIDS UR QL SCN: NEGATIVE
CHLORIDE SERPL-SCNC: 106 MMOL/L
CK SERPL-CCNC: 58 U/L
CLARITY UR: CLEAR
CO2 SERPL-SCNC: 24 MMOL/L
COLOR UR: YELLOW
CREAT SERPL-MCNC: 0.8 MG/DL
CREAT UR-MCNC: 14.9 MG/DL
DIFFERENTIAL METHOD: ABNORMAL
EOSINOPHIL # BLD AUTO: 0 K/UL
EOSINOPHIL NFR BLD: 0.1 %
ERYTHROCYTE [DISTWIDTH] IN BLOOD BY AUTOMATED COUNT: 13.6 %
EST. GFR  (AFRICAN AMERICAN): >60 ML/MIN/1.73 M^2
EST. GFR  (NON AFRICAN AMERICAN): >60 ML/MIN/1.73 M^2
ETHANOL SERPL-MCNC: <10 MG/DL
GLUCOSE SERPL-MCNC: 91 MG/DL
GLUCOSE UR QL STRIP: NEGATIVE
HCT VFR BLD AUTO: 38.5 %
HGB BLD-MCNC: 12.9 G/DL
HGB UR QL STRIP: ABNORMAL
KETONES UR QL STRIP: NEGATIVE
LACTATE SERPL-SCNC: 1.2 MMOL/L
LEUKOCYTE ESTERASE UR QL STRIP: ABNORMAL
LYMPHOCYTES # BLD AUTO: 1 K/UL
LYMPHOCYTES NFR BLD: 14.3 %
MCH RBC QN AUTO: 27.2 PG
MCHC RBC AUTO-ENTMCNC: 33.5 G/DL
MCV RBC AUTO: 81 FL
METHADONE UR QL SCN>300 NG/ML: NEGATIVE
MICROSCOPIC COMMENT: ABNORMAL
MONOCYTES # BLD AUTO: 0.6 K/UL
MONOCYTES NFR BLD: 8.2 %
NEUTROPHILS # BLD AUTO: 5.5 K/UL
NEUTROPHILS NFR BLD: 77.2 %
NITRITE UR QL STRIP: POSITIVE
OPIATES UR QL SCN: NEGATIVE
PCP UR QL SCN>25 NG/ML: NEGATIVE
PH UR STRIP: 7 [PH] (ref 5–8)
PLATELET # BLD AUTO: 217 K/UL
PMV BLD AUTO: 10.2 FL
POTASSIUM SERPL-SCNC: 3.4 MMOL/L
PROT SERPL-MCNC: 7.4 G/DL
PROT UR QL STRIP: NEGATIVE
RBC # BLD AUTO: 4.74 M/UL
RBC #/AREA URNS HPF: 2 /HPF (ref 0–4)
SALICYLATES SERPL-MCNC: <5 MG/DL
SODIUM SERPL-SCNC: 142 MMOL/L
SP GR UR STRIP: 1.01 (ref 1–1.03)
SQUAMOUS #/AREA URNS HPF: 0 /HPF
T4 FREE SERPL-MCNC: 1.06 NG/DL
TOXICOLOGY INFORMATION: ABNORMAL
TROPONIN I SERPL DL<=0.01 NG/ML-MCNC: <0.006 NG/ML
TSH SERPL DL<=0.005 MIU/L-ACNC: 5.06 UIU/ML
URN SPEC COLLECT METH UR: ABNORMAL
UROBILINOGEN UR STRIP-ACNC: NEGATIVE EU/DL
WBC # BLD AUTO: 7.19 K/UL
WBC #/AREA URNS HPF: 8 /HPF (ref 0–5)

## 2017-08-20 PROCEDURE — 63600175 PHARM REV CODE 636 W HCPCS: Performed by: EMERGENCY MEDICINE

## 2017-08-20 PROCEDURE — 87086 URINE CULTURE/COLONY COUNT: CPT

## 2017-08-20 PROCEDURE — 87040 BLOOD CULTURE FOR BACTERIA: CPT

## 2017-08-20 PROCEDURE — 93005 ELECTROCARDIOGRAM TRACING: CPT

## 2017-08-20 PROCEDURE — 82962 GLUCOSE BLOOD TEST: CPT

## 2017-08-20 PROCEDURE — 87184 SC STD DISK METHOD PER PLATE: CPT

## 2017-08-20 PROCEDURE — 87077 CULTURE AEROBIC IDENTIFY: CPT

## 2017-08-20 PROCEDURE — 87088 URINE BACTERIA CULTURE: CPT

## 2017-08-20 PROCEDURE — 84439 ASSAY OF FREE THYROXINE: CPT

## 2017-08-20 PROCEDURE — G0378 HOSPITAL OBSERVATION PER HR: HCPCS

## 2017-08-20 PROCEDURE — 84443 ASSAY THYROID STIM HORMONE: CPT

## 2017-08-20 PROCEDURE — 87186 SC STD MICRODIL/AGAR DIL: CPT

## 2017-08-20 PROCEDURE — 82140 ASSAY OF AMMONIA: CPT

## 2017-08-20 PROCEDURE — 96361 HYDRATE IV INFUSION ADD-ON: CPT

## 2017-08-20 PROCEDURE — 84484 ASSAY OF TROPONIN QUANT: CPT

## 2017-08-20 PROCEDURE — 80307 DRUG TEST PRSMV CHEM ANLYZR: CPT

## 2017-08-20 PROCEDURE — 80320 DRUG SCREEN QUANTALCOHOLS: CPT

## 2017-08-20 PROCEDURE — 80329 ANALGESICS NON-OPIOID 1 OR 2: CPT

## 2017-08-20 PROCEDURE — 82550 ASSAY OF CK (CPK): CPT

## 2017-08-20 PROCEDURE — 83605 ASSAY OF LACTIC ACID: CPT

## 2017-08-20 PROCEDURE — 80053 COMPREHEN METABOLIC PANEL: CPT

## 2017-08-20 PROCEDURE — 85025 COMPLETE CBC W/AUTO DIFF WBC: CPT

## 2017-08-20 PROCEDURE — 96365 THER/PROPH/DIAG IV INF INIT: CPT

## 2017-08-20 PROCEDURE — 93010 ELECTROCARDIOGRAM REPORT: CPT | Mod: ,,, | Performed by: INTERNAL MEDICINE

## 2017-08-20 PROCEDURE — 81000 URINALYSIS NONAUTO W/SCOPE: CPT

## 2017-08-20 PROCEDURE — P9612 CATHETERIZE FOR URINE SPEC: HCPCS

## 2017-08-20 PROCEDURE — 99285 EMERGENCY DEPT VISIT HI MDM: CPT | Mod: 25

## 2017-08-20 RX ADMIN — CEFTRIAXONE 1 G: 1 INJECTION, SOLUTION INTRAVENOUS at 11:08

## 2017-08-21 PROBLEM — R56.9 SEIZURE: Status: ACTIVE | Noted: 2017-08-21

## 2017-08-21 LAB
ANION GAP SERPL CALC-SCNC: 9 MMOL/L
BACTERIA #/AREA URNS HPF: NORMAL /HPF
BILIRUB UR QL STRIP: NEGATIVE
BUN SERPL-MCNC: 7 MG/DL
CALCIUM SERPL-MCNC: 9.1 MG/DL
CHLORIDE SERPL-SCNC: 107 MMOL/L
CLARITY UR: CLEAR
CO2 SERPL-SCNC: 24 MMOL/L
COLOR UR: YELLOW
CREAT SERPL-MCNC: 0.7 MG/DL
DIASTOLIC DYSFUNCTION: YES
EST. GFR  (AFRICAN AMERICAN): >60 ML/MIN/1.73 M^2
EST. GFR  (NON AFRICAN AMERICAN): >60 ML/MIN/1.73 M^2
GLOBAL PERICARDIAL EFFUSION: ABNORMAL
GLUCOSE SERPL-MCNC: 104 MG/DL
GLUCOSE UR QL STRIP: NEGATIVE
HGB UR QL STRIP: ABNORMAL
KETONES UR QL STRIP: NEGATIVE
LEUKOCYTE ESTERASE UR QL STRIP: ABNORMAL
MAGNESIUM SERPL-MCNC: 2.1 MG/DL
MICROSCOPIC COMMENT: NORMAL
NITRITE UR QL STRIP: NEGATIVE
PH UR STRIP: 7 [PH] (ref 5–8)
PHOSPHATE SERPL-MCNC: 3.2 MG/DL
POCT GLUCOSE: 105 MG/DL (ref 70–110)
POTASSIUM SERPL-SCNC: 3.2 MMOL/L
PROT UR QL STRIP: NEGATIVE
RBC #/AREA URNS HPF: 3 /HPF (ref 0–4)
RETIRED EF AND QEF - SEE NOTES: 65 (ref 55–65)
SODIUM SERPL-SCNC: 140 MMOL/L
SP GR UR STRIP: <=1.005 (ref 1–1.03)
SQUAMOUS #/AREA URNS HPF: 0 /HPF
URN SPEC COLLECT METH UR: ABNORMAL
UROBILINOGEN UR STRIP-ACNC: NEGATIVE EU/DL
WBC #/AREA URNS HPF: 2 /HPF (ref 0–5)

## 2017-08-21 PROCEDURE — 25000003 PHARM REV CODE 250: Performed by: HOSPITALIST

## 2017-08-21 PROCEDURE — 87086 URINE CULTURE/COLONY COUNT: CPT

## 2017-08-21 PROCEDURE — 80048 BASIC METABOLIC PNL TOTAL CA: CPT

## 2017-08-21 PROCEDURE — 25000003 PHARM REV CODE 250: Performed by: NURSE PRACTITIONER

## 2017-08-21 PROCEDURE — 87088 URINE BACTERIA CULTURE: CPT

## 2017-08-21 PROCEDURE — 11000001 HC ACUTE MED/SURG PRIVATE ROOM

## 2017-08-21 PROCEDURE — 81000 URINALYSIS NONAUTO W/SCOPE: CPT

## 2017-08-21 PROCEDURE — 93306 TTE W/DOPPLER COMPLETE: CPT | Mod: 26,,, | Performed by: INTERNAL MEDICINE

## 2017-08-21 PROCEDURE — 99223 1ST HOSP IP/OBS HIGH 75: CPT | Mod: 25,,, | Performed by: UROLOGY

## 2017-08-21 PROCEDURE — 84100 ASSAY OF PHOSPHORUS: CPT

## 2017-08-21 PROCEDURE — 94761 N-INVAS EAR/PLS OXIMETRY MLT: CPT

## 2017-08-21 PROCEDURE — 87077 CULTURE AEROBIC IDENTIFY: CPT

## 2017-08-21 PROCEDURE — 93306 TTE W/DOPPLER COMPLETE: CPT

## 2017-08-21 PROCEDURE — 87186 SC STD MICRODIL/AGAR DIL: CPT

## 2017-08-21 PROCEDURE — 51705 CHANGE OF BLADDER TUBE: CPT | Mod: ,,, | Performed by: UROLOGY

## 2017-08-21 PROCEDURE — 83735 ASSAY OF MAGNESIUM: CPT

## 2017-08-21 PROCEDURE — 25000003 PHARM REV CODE 250: Performed by: EMERGENCY MEDICINE

## 2017-08-21 RX ORDER — LIDOCAINE 50 MG/G
1 PATCH TOPICAL
Status: DISCONTINUED | OUTPATIENT
Start: 2017-08-21 | End: 2017-08-23 | Stop reason: HOSPADM

## 2017-08-21 RX ORDER — OXYCODONE AND ACETAMINOPHEN 10; 325 MG/1; MG/1
1 TABLET ORAL EVERY 8 HOURS PRN
Status: DISCONTINUED | OUTPATIENT
Start: 2017-08-21 | End: 2017-08-21

## 2017-08-21 RX ORDER — FLUOXETINE HYDROCHLORIDE 20 MG/1
60 CAPSULE ORAL DAILY
Status: DISCONTINUED | OUTPATIENT
Start: 2017-08-21 | End: 2017-08-23 | Stop reason: HOSPADM

## 2017-08-21 RX ORDER — OXYCODONE AND ACETAMINOPHEN 10; 325 MG/1; MG/1
1 TABLET ORAL EVERY 4 HOURS PRN
Status: DISCONTINUED | OUTPATIENT
Start: 2017-08-21 | End: 2017-08-23 | Stop reason: HOSPADM

## 2017-08-21 RX ORDER — SODIUM CHLORIDE 9 MG/ML
INJECTION, SOLUTION INTRAVENOUS CONTINUOUS
Status: DISCONTINUED | OUTPATIENT
Start: 2017-08-21 | End: 2017-08-22

## 2017-08-21 RX ORDER — BUTALBITAL, ACETAMINOPHEN AND CAFFEINE 50; 325; 40 MG/1; MG/1; MG/1
1 TABLET ORAL
Status: COMPLETED | OUTPATIENT
Start: 2017-08-21 | End: 2017-08-21

## 2017-08-21 RX ORDER — ASPIRIN 81 MG/1
81 TABLET ORAL DAILY
Status: DISCONTINUED | OUTPATIENT
Start: 2017-08-21 | End: 2017-08-23 | Stop reason: HOSPADM

## 2017-08-21 RX ORDER — POTASSIUM CHLORIDE 20 MEQ/1
40 TABLET, EXTENDED RELEASE ORAL ONCE
Status: COMPLETED | OUTPATIENT
Start: 2017-08-21 | End: 2017-08-21

## 2017-08-21 RX ORDER — ONDANSETRON 2 MG/ML
4 INJECTION INTRAMUSCULAR; INTRAVENOUS EVERY 12 HOURS PRN
Status: DISCONTINUED | OUTPATIENT
Start: 2017-08-21 | End: 2017-08-23 | Stop reason: HOSPADM

## 2017-08-21 RX ORDER — LEVOTHYROXINE SODIUM 112 UG/1
112 TABLET ORAL
Status: DISCONTINUED | OUTPATIENT
Start: 2017-08-21 | End: 2017-08-23 | Stop reason: HOSPADM

## 2017-08-21 RX ORDER — ACETAMINOPHEN 325 MG/1
650 TABLET ORAL EVERY 4 HOURS PRN
Status: DISCONTINUED | OUTPATIENT
Start: 2017-08-21 | End: 2017-08-23 | Stop reason: HOSPADM

## 2017-08-21 RX ORDER — SODIUM CHLORIDE 9 MG/ML
INJECTION, SOLUTION INTRAVENOUS ONCE
Status: COMPLETED | OUTPATIENT
Start: 2017-08-21 | End: 2017-08-21

## 2017-08-21 RX ORDER — ATORVASTATIN CALCIUM 40 MG/1
80 TABLET, FILM COATED ORAL NIGHTLY
Status: DISCONTINUED | OUTPATIENT
Start: 2017-08-21 | End: 2017-08-23 | Stop reason: HOSPADM

## 2017-08-21 RX ORDER — SENNOSIDES 8.6 MG/1
8.6 TABLET ORAL DAILY PRN
Status: DISCONTINUED | OUTPATIENT
Start: 2017-08-21 | End: 2017-08-23 | Stop reason: HOSPADM

## 2017-08-21 RX ORDER — OXYBUTYNIN CHLORIDE 5 MG/1
15 TABLET, EXTENDED RELEASE ORAL DAILY
Status: DISCONTINUED | OUTPATIENT
Start: 2017-08-21 | End: 2017-08-23 | Stop reason: HOSPADM

## 2017-08-21 RX ORDER — PANTOPRAZOLE SODIUM 40 MG/1
40 TABLET, DELAYED RELEASE ORAL DAILY
Status: DISCONTINUED | OUTPATIENT
Start: 2017-08-21 | End: 2017-08-23 | Stop reason: HOSPADM

## 2017-08-21 RX ORDER — ALPRAZOLAM 0.25 MG/1
0.5 TABLET ORAL 2 TIMES DAILY PRN
Status: DISCONTINUED | OUTPATIENT
Start: 2017-08-21 | End: 2017-08-23 | Stop reason: HOSPADM

## 2017-08-21 RX ORDER — BUTALBITAL, ASPIRIN, AND CAFFEINE 325; 50; 40 MG/1; MG/1; MG/1
1 CAPSULE ORAL EVERY 6 HOURS PRN
Status: DISCONTINUED | OUTPATIENT
Start: 2017-08-21 | End: 2017-08-23 | Stop reason: HOSPADM

## 2017-08-21 RX ORDER — ONDANSETRON HYDROCHLORIDE 8 MG/1
8 TABLET, FILM COATED ORAL EVERY 12 HOURS PRN
Status: DISCONTINUED | OUTPATIENT
Start: 2017-08-21 | End: 2017-08-23 | Stop reason: HOSPADM

## 2017-08-21 RX ORDER — DOCUSATE SODIUM 100 MG/1
100 CAPSULE, LIQUID FILLED ORAL 2 TIMES DAILY
Status: DISCONTINUED | OUTPATIENT
Start: 2017-08-21 | End: 2017-08-23 | Stop reason: HOSPADM

## 2017-08-21 RX ADMIN — ACETAMINOPHEN 650 MG: 325 TABLET ORAL at 09:08

## 2017-08-21 RX ADMIN — OXYCODONE HYDROCHLORIDE AND ACETAMINOPHEN 1 TABLET: 10; 325 TABLET ORAL at 06:08

## 2017-08-21 RX ADMIN — LIDOCAINE 1 PATCH: 50 PATCH TOPICAL at 06:08

## 2017-08-21 RX ADMIN — TRAZODONE HYDROCHLORIDE 150 MG: 100 TABLET ORAL at 09:08

## 2017-08-21 RX ADMIN — OXYBUTYNIN CHLORIDE 15 MG: 5 TABLET, FILM COATED, EXTENDED RELEASE ORAL at 12:08

## 2017-08-21 RX ADMIN — FLUOXETINE 60 MG: 20 CAPSULE ORAL at 11:08

## 2017-08-21 RX ADMIN — ATORVASTATIN CALCIUM 80 MG: 40 TABLET, FILM COATED ORAL at 09:08

## 2017-08-21 RX ADMIN — DOCUSATE SODIUM 100 MG: 100 CAPSULE, LIQUID FILLED ORAL at 09:08

## 2017-08-21 RX ADMIN — POTASSIUM CHLORIDE 40 MEQ: 20 TABLET, EXTENDED RELEASE ORAL at 11:08

## 2017-08-21 RX ADMIN — BUTALBITAL, ACETAMINOPHEN, AND CAFFEINE 1 TABLET: 50; 325; 40 TABLET ORAL at 01:08

## 2017-08-21 RX ADMIN — SODIUM CHLORIDE: 0.9 INJECTION, SOLUTION INTRAVENOUS at 04:08

## 2017-08-21 RX ADMIN — PANTOPRAZOLE SODIUM 40 MG: 40 TABLET, DELAYED RELEASE ORAL at 09:08

## 2017-08-21 RX ADMIN — SODIUM CHLORIDE 1000 ML: 0.9 INJECTION, SOLUTION INTRAVENOUS at 01:08

## 2017-08-21 RX ADMIN — SODIUM CHLORIDE: 0.9 INJECTION, SOLUTION INTRAVENOUS at 11:08

## 2017-08-21 RX ADMIN — ALPRAZOLAM 0.5 MG: 0.25 TABLET ORAL at 12:08

## 2017-08-21 RX ADMIN — LEVOTHYROXINE SODIUM 112 MCG: 112 TABLET ORAL at 08:08

## 2017-08-21 RX ADMIN — ALPRAZOLAM 0.5 MG: 0.25 TABLET ORAL at 09:08

## 2017-08-21 RX ADMIN — OXYCODONE HYDROCHLORIDE AND ACETAMINOPHEN 1 TABLET: 10; 325 TABLET ORAL at 12:08

## 2017-08-21 RX ADMIN — SODIUM CHLORIDE: 0.9 INJECTION, SOLUTION INTRAVENOUS at 03:08

## 2017-08-21 RX ADMIN — ASPIRIN 81 MG: 81 TABLET, COATED ORAL at 09:08

## 2017-08-21 NOTE — ED NOTES
Dr. Woodruff at bedside reviewing results with pt. And family. Aware of the plan of care to be admitted to the hospital.

## 2017-08-21 NOTE — H&P
[facility] Medicine  History & Physical    Patient Name: Tasha Hawley  MRN: [id number]  Admission Date: 8/20/2017  Attending Physician: Josy Woodruff MD   Primary Care Provider: Mesfin Hodges Ii, MD         Patient information was obtained from patient, past medical records and ER records.     Subjective:     Principal Problem:Altered mental state    Chief Complaint:   Chief Complaint   Patient presents with    Seizures     pt arrived visa [facility] with chief complaint of possible seizure. denies hx of seizures. AAO        HPI:  Tasha Hawley is a 61 year  female with significant past medical history of CAD, CHF, chronic back pain and neurogenic bladder with suprapubic catheter, GERD, Iron Deficiency Anemia, Anxiety and Depression. She experienced a work related injury about 20 years ago which resulted in multiple back surgeries. 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  She has a SQ Dilaudid Pain Pump that is completely under the skin and dosage cannot be monitored or adjusted without special equipment. It is management by Dr. Hillman, Pain Management. Her PCP is Dr. Mesfin Hodges and her urologist is Dr. Mary Griffin.     She was brought to [facility] ED by EMS with possible seizure.  Her  stated that she slept til 3pm today, much later than usual.  She told her daughter she wasn't feeling well and she felt off mentally.   She was left with her grand-daughter. The patient called her daughter to come back because she felt ill and when she returned she found her mother on the ground next to the swing.  She did note some jerking movements of her head.  EMS reports that she appeared post ictal on their arrival.  She did clear and then had jerking movements of her head and left arm.  She was postictal for a couple of seconds and then cleared.  EMS states she also appeared to pass out a couple of times and was bradycardic during  these episodes.  She would then returned to baseline.  SAJAN alfonso also reports that the pt has not been feeling well for several days and has been having diarrhea.  She has not been eating or drinking much.  Her dilaudid dosage on her pump was increased by 3% 4 days ago. She was admitted in March 2017 with altered mental similar to this after an increase in her dilaudid dosage.  She also takes ativan and still has Norco at home and just got a new prescription for percocet 10.  She lethargic, but easily aroused, AAOx3 with no neurological deficits.    Her Chest X-ray and Head CT were negative.  Her CBC and CMP are essentially normal except potassium of 3.4.  Her urine appears to be infected with 1+ Occult Blood, Nitrite + and Moderate bacteria. However, her last suprapubic catheter changed was about 2 weeks ago, and this may be a false positive. Her  stated the urine became very dark yesterday and she is dry on exam.  She was given ceftriaxone 1 gm IV in the ED. She admitted for observation to [facility] for altered mental status for close monitoring,  Neurochecks, IV hydration. I will treat catheter associated UTI empirically with Meropenum and consult urology for catheter change.         Past Medical History:   Diagnosis Date    Anxiety     Arthritis     Bilateral lower extremity edema     severe chronic    Carotid artery occlusion     Cataract     Depression     Fever blister     Hypothyroid     Iron deficiency anemia     Lumbar radiculopathy     with chronic pain    Ocular migraine     Sleep apnea     cpap       Past Surgical History:   Procedure Laterality Date    BACK SURGERY      x 12    CATARACT EXTRACTION W/  INTRAOCULAR LENS IMPLANT Left     Dr Coleman     HYSTERECTOMY  1975    endometriosis    pain pump placement      SQ Dilaudid Pump managed by Dr. Hillman, Pain Management    SPINAL CORD STIMULATOR REMOVAL      before Larissa    SPINE SURGERY  5-13-13    CERVICAL FUSION        Review of patient's allergies indicates:   Allergen Reactions    (d)-limonene flavor      Other reaction(s): difficult intubation  Other reaction(s): Difficulty breathing    Bactrim [sulfamethoxazole-trimethoprim] Anaphylaxis    Imitrex [sumatriptan succinate] Shortness Of Breath    Penicillins Shortness Of Breath     Other reaction(s): Jittery    Topamax [topiramate] Shortness Of Breath    Vancomycin Shortness Of Breath     Rash    Butorphanol tartrate     Darvocet a500 [propoxyphene n-acetaminophen]      Other reaction(s): Difficulty breathing    Fentanyl      Other reaction(s): Vomiting  Other reaction(s): Nausea  Other reaction(s): Itching  swelling    White petrolatum-zinc     Zinc oxide-white petrolatum      Other reaction(s): Difficulty breathing    Latex, natural rubber Itching and Rash       No current facility-administered medications on file prior to encounter.      Current Outpatient Prescriptions on File Prior to Encounter   Medication Sig    acyclovir (ZOVIRAX) 400 MG tablet Take 1 tablet (400 mg total) by mouth 3 (three) times daily.    alprazolam (XANAX) 0.5 MG tablet Take 1 tablet (0.5 mg total) by mouth 2 (two) times daily as needed for Anxiety.    aspirin (ECOTRIN) 81 MG EC tablet Take 1 tablet (81 mg total) by mouth once daily.    atorvastatin (LIPITOR) 80 MG tablet Take 1 tablet (80 mg total) by mouth once daily. (Patient taking differently: Take 80 mg by mouth every evening. )    BUTALBITAL/ASPIRIN/CAFFEINE (FIORINAL ORAL) Take by mouth as needed.    DOC-Q-LACE 100 mg capsule TAKE ONE CAPSULE BY MOUTH THREE TIMES DAILY AS NEEDED FOR CONSTIPATION    fluoxetine (PROZAC) 20 MG capsule Take 3 capsules (60 mg total) by mouth once daily.    levothyroxine (SYNTHROID) 112 MCG tablet Take 1 tablet (112 mcg total) by mouth before breakfast.    ondansetron (ZOFRAN) 8 MG tablet Take 1 tablet (8 mg total) by mouth every 12 (twelve) hours as needed for Nausea.    oxybutynin  (DITROPAN XL) 15 MG TR24 Take 1 tablet (15 mg total) by mouth once daily.    oxycodone-acetaminophen (PERCOCET)  mg per tablet     pantoprazole (PROTONIX) 40 MG tablet Take 1 tablet (40 mg total) by mouth once daily.    SENNOSIDES (SENNA LAX ORAL) Take by mouth as needed.    trazodone (DESYREL) 50 MG tablet Take 3 tablets (150 mg total) by mouth every evening.    [DISCONTINUED] (Magic mouthwash) 1:1:1 Benadryl 12.5mg/5ml liq, aluminum & magnesium hydroxide-simehticone (Maalox), lidocaine viscous 2% Swish and spit 5 mLs every 4 (four) hours as needed. for mouth sores    [DISCONTINUED] acetaminophen (TYLENOL) 500 MG tablet Take 500 mg by mouth every 6 (six) hours as needed for Pain.    [DISCONTINUED] azithromycin (Z-MILTON) 250 MG tablet Take 1 tablet (250 mg total) by mouth once daily. Take first 2 tablets together, then 1 every day until finished.    [DISCONTINUED] butalbital-acetaminop-caf-cod -25-30 mg Cap     [DISCONTINUED] ciprofloxacin HCl (CIPRO) 500 MG tablet     [DISCONTINUED] doxycycline (MONODOX) 100 MG capsule     [DISCONTINUED] fluconazole (DIFLUCAN) 100 MG tablet     [DISCONTINUED] hydrocodone-acetaminophen 10-325mg (NORCO)  mg Tab Take 1 tablet by mouth every 6 (six) hours as needed.    [DISCONTINUED] hyoscyamine (ANASPAZ,LEVSIN) 0.125 mg Tab TAKE 1 TABLET (125 MCG TOTAL) BY MOUTH EVERY 6 (SIX) HOURS AS NEEDED.    [DISCONTINUED] lamotrigine (LAMICTAL) 25 MG tablet Take 1 tablet (25 mg total) by mouth once daily.    [DISCONTINUED] levothyroxine (SYNTHROID) 125 MCG tablet     [DISCONTINUED] LIDOCAINE VISCOUS 2 % solution     [DISCONTINUED] lidocaine-prilocaine (EMLA) cream     [DISCONTINUED] polyethylene glycol (GLYCOLAX) 17 gram PwPk Take 17 g by mouth 2 (two) times daily.    [DISCONTINUED] ranitidine (ZANTAC) 150 MG capsule Take 150 mg by mouth 2 (two) times daily.    [DISCONTINUED] VOLTAREN 1 % Gel      Family History     Problem Relation (Age of Onset)    Cancer  Mother (55), Father    Esophageal cancer Father    Heart disease Maternal Uncle    No Known Problems Brother, Brother, Sister, Maternal Aunt, Paternal Aunt, Paternal Uncle, Maternal Grandfather, Paternal Grandmother, Paternal Grandfather    Parkinsonism Maternal Grandmother    Tremor Maternal Grandmother        Social History Main Topics    Smoking status: Never Smoker    Smokeless tobacco: Never Used    Alcohol use No      Comment: denies    Drug use: No    Sexual activity: No     Review of Systems   Constitutional: Positive for appetite change and fatigue. Negative for chills, diaphoresis and fever.   HENT: Negative for sore throat and trouble swallowing.    Eyes: Negative for photophobia and visual disturbance.   Respiratory: Negative for cough, shortness of breath and wheezing.    Cardiovascular: Negative for chest pain and palpitations.   Gastrointestinal: Negative for abdominal pain, constipation, diarrhea, nausea and vomiting.   Endocrine: Negative for polydipsia and polyphagia.   Genitourinary: Negative for decreased urine volume, dysuria, hematuria and urgency.   Musculoskeletal: Negative for joint swelling, neck pain and neck stiffness.   Neurological: Positive for dizziness, seizures (Possible), weakness and light-headedness. Negative for numbness and headaches.   Psychiatric/Behavioral: Negative for agitation, dysphoric mood and suicidal ideas.     Objective:     Vital Signs (Most Recent):  Temp: 99.8 °F (37.7 °C) (08/21/17 0345)  Pulse: (!) 56 (08/21/17 0345)  Resp: 16 (08/21/17 0345)  BP: (!) 129/58 (08/21/17 0345)  SpO2: 96 % (08/21/17 0345) Vital Signs (24h Range):  Temp:  [98.8 °F (37.1 °C)-99.8 °F (37.7 °C)] 99.8 °F (37.7 °C)  Pulse:  [48-58] 56  Resp:  [16-19] 16  SpO2:  [96 %-100 %] 96 %  BP: (116-143)/(52-79) 129/58     Weight: 59 kg (130 lb)  Body mass index is 20.36 kg/m².    Physical Exam   Constitutional: She is oriented to person, place, and time. She appears lethargic. She is easily  aroused. She has a sickly appearance. No distress.       HENT:   Head: Normocephalic and atraumatic.   Mouth/Throat: No oropharyngeal exudate.   Oropharynx dry   Eyes: Conjunctivae are normal. Pupils are equal, round, and reactive to light. No scleral icterus.   Neck: Neck supple.   Cardiovascular: Normal rate, regular rhythm, normal heart sounds and intact distal pulses.  Exam reveals no gallop and no friction rub.    No murmur heard.  Pulmonary/Chest: Effort normal and breath sounds normal. No respiratory distress. She has no wheezes. She has no rales.   Abdominal: Soft. Bowel sounds are normal. She exhibits no distension. There is no tenderness. There is no rebound and no guarding.   Musculoskeletal: She exhibits no edema, tenderness or deformity.   Neurological: She is oriented to person, place, and time and easily aroused. She appears lethargic. She exhibits normal muscle tone.   Skin: Skin is warm and dry. Capillary refill takes less than 2 seconds. No rash noted. She is not diaphoretic. There is pallor.   Psychiatric: She has a normal mood and affect. Her behavior is normal.   Nursing note and vitals reviewed.       Significant Labs:   CBC:   Recent Labs  Lab 08/20/17 2049   WBC 7.19   HGB 12.9   HCT 38.5        CMP:   Recent Labs  Lab 08/20/17 2049      K 3.4*      CO2 24   GLU 91   BUN 8   CREATININE 0.8   CALCIUM 9.7   PROT 7.4   ALBUMIN 4.0   BILITOT 0.8   ALKPHOS 123   AST 14   ALT 12   ANIONGAP 12   EGFRNONAA >60     Cardiac Markers: No results for input(s): CKMB, MYOGLOBIN, BNP, TROPISTAT in the last 48 hours.  Troponin:   Recent Labs  Lab 08/20/17 2049   TROPONINI <0.006     TSH:   Recent Labs  Lab 08/20/17 2049   TSH 5.063*     Status:  Final result    Ref Range & Units 08/20/17 2049   Acetaminophen (Tylenol), Serum 10.0 - 20.0 ug/mL <3.0          Ref Range & Units 08/20/17 2049   Salicylate Lvl 15.0 - 30.0 mg/dL <5.0      Status:  Final result    Ref Range & Units 08/20/17  2049   Alcohol, Medical, Serum <10 mg/dL <10        Status:  Final result    Ref Range & Units 08/20/17 2049   Acetaminophen (Tylenol), Serum 10.0 - 20.0 ug/mL <3.0         Urine Studies:   Recent Labs  Lab 08/20/17 2050   COLORU Yellow   APPEARANCEUA Clear   PHUR 7.0   SPECGRAV 1.010   PROTEINUA Negative   GLUCUA Negative   KETONESU Negative   BILIRUBINUA Negative   OCCULTUA 1+*   NITRITE Positive*   UROBILINOGEN Negative   LEUKOCYTESUR 2+*   RBCUA 2   WBCUA 8*   BACTERIA Moderate*   SQUAMEPITHEL 0       Significant Imaging: I have reviewed all pertinent imaging results/findings within the past 24 hours.   Imaging Results          CT Head Without Contrast (Final result)  Result time 08/20/17 21:41:25    Final result by Barb Rosado MD (08/20/17 21:41:25)                 Impression:        1.  No acute major vascular territory infarct or intracranial hemorrhage identified. Further evaluation/follow up as warranted.          Electronically signed by: BARB ROSADO MD, MD  Date:     08/20/17  Time:    21:41              Narrative:    COMPARISON: Head CT 5/14/17    FINDINGS: No evidence of hemorrhage, mass, midline shift or acute major vascular territory infarct.  Gray white interface appears intact.  Grossly stable minimal periventricular nonspecific low attenuation throughout the supratentorial white matter. No definite new focal areas of abnormal parenchymal attenuation.  The ventricles are midline without distortion by mass effect.  No extra-axial hemorrhage or mass. Minimal atherosclerotic vascular calcifications at the cavernous ICAs.     The extracranial structures are within normal limits.  Calvarium is intact.  Visualized paranasal sinuses are clear.  Mastoid air cells are clear.                             X-Ray Chest 1 View (Final result)  Result time 08/20/17 20:46:32    Final result by Barb Rosado MD (08/20/17 20:46:32)                 Impression:        No radiographic acute intrathoracic process  seen on this single view.      Electronically signed by: BARB BERMUDEZ MD, MD  Date:     08/20/17  Time:    20:46              Narrative:    COMPARISON: Chest radiograph 5/20/17    FINDINGS: AP portable semiupright view of the chest. The patient is markedly rotated. The bilateral lungs are well expanded without large consolidationor new focal opacity.  No large pleural effusion or pneumothorax.  Cardiomediastinal silhouette appears grossly stable allowing for AP projection and rotation.  Lower cervical ACDF and lower thoracic spinal stimulator electrodes again noted. No acute osseous process seen.   PA and lateral views can be obtained.                                Assessment/Plan:     Urinary tract infection associated with cystostomy catheter    Neurogenic Bladder, Suprapubic Catheter  Urine appears to be infected with 1+ Occult Blood, Nitrite + and Moderate bacteria. However, her last suprapubic catheter changed was about 2 weeks ago, and this may be a false positive. No leukocytisis. Her  stated the urine became very dark yesterday. She was given ceftriaxone 1 gm in the ED  --Consult Urology for catheter change and repeat UA/Culture  --Will treat empirically for catheter associated UTI with Meropenum  --IV Fluids            * Altered mental state    Syncope and Collapse  Patient presents via EMS with lethargy, concern for seizure activity per EMS with syncope.  Patient had been feeling poorly with decreased oral intake for the past 2 days.  Her Dilaudid infusion dosage was increased by 3% 4 days ago and she received a new prescription of Percocet 10, still with Norco 10 at home.  I suspect her AMS and Syncope is related to narcotic effects.    --Neuro checks ever 4 hours          Chronic pain    Osteoarthritis of spine with radiculopathy, lumbar region  Narcotic Dependence, S/P insertion of intrathecal pump,   Continue Home Medication regimin as follows:  --Fiorinal as needed for headache  --Oxycodone   mg TID prn Breakthrough Pain  --Hydromorphone infusion via implantable pump.  Should contact Dr. Hillman's office for immediate follow-up to adjust dosage, as symptoms started after increase in dosage.         Primary hypothyroidism    TSH 5.063. Continue home levothyroxine 112 mcg daily          Neurogenic bladder              Coronary artery disease involving native coronary artery of native heart without angina pectoris    --Telemetry Monitoring  --12 ECG Negative for STEMI  --Continue home ASA 81 mg daily          Major depressive disorder, single episode    Continue home alprazolam 0.5 mg BID          Iron deficiency anemia    Hgb 12.9, Hct 38.5. Above baseline. Evidence of dehydration. Monitor            VTE Risk Mitigation     None             Nurys Khan NP  Department of Hospital Medicine   [facility]

## 2017-08-21 NOTE — ASSESSMENT & PLAN NOTE
Neurogenic Bladder, Suprapubic Catheter  Urine appears to be infected with 1+ Occult Blood, Nitrite + and Moderate bacteria. However, her last suprapubic catheter changed was about 2 weeks ago, and this may be a false positive. No leukocytisis. Her  stated the urine became very dark yesterday. She was given ceftriaxone 1 gm in the ED  --Consult Urology for catheter change and repeat UA/Culture  --Will treat empirically for catheter associated UTI with Meropenum  --IV Fluids

## 2017-08-21 NOTE — ED TRIAGE NOTES
Pt. Care assumed, pt. Awake, alert and oriented. Pt.s spouse and daughter at the bedside. Cardiac monitor shows sinus bradycardia with HR-56, skin PWD, oxygen saturation 97% on room air. Pt. C/o having a headache. Breath sounds clear bilaterally, abdomen soft and non tender. Supra pubic catheter draining clear, yellow urine in meadows bag.

## 2017-08-21 NOTE — SUBJECTIVE & OBJECTIVE
Past Medical History:   Diagnosis Date    Anxiety     Arthritis     Bilateral lower extremity edema     severe chronic    Carotid artery occlusion     Cataract     Depression     Fever blister     Hypothyroid     Iron deficiency anemia     Lumbar radiculopathy     with chronic pain    Ocular migraine     Sleep apnea     cpap       Past Surgical History:   Procedure Laterality Date    BACK SURGERY      x 12    CATARACT EXTRACTION W/  INTRAOCULAR LENS IMPLANT Left     Dr Coleman     HYSTERECTOMY  1975    endometriosis    pain pump placement      SQ Dilaudid Pump managed by Dr. Hillman, Pain Management    SPINAL CORD STIMULATOR REMOVAL      before Larissa    SPINE SURGERY  5-13-13    CERVICAL FUSION       Review of patient's allergies indicates:   Allergen Reactions    (d)-limonene flavor      Other reaction(s): difficult intubation  Other reaction(s): Difficulty breathing    Bactrim [sulfamethoxazole-trimethoprim] Anaphylaxis    Imitrex [sumatriptan succinate] Shortness Of Breath    Penicillins Shortness Of Breath     Other reaction(s): Jittery    Topamax [topiramate] Shortness Of Breath    Vancomycin Shortness Of Breath     Rash    Butorphanol tartrate     Darvocet a500 [propoxyphene n-acetaminophen]      Other reaction(s): Difficulty breathing    Fentanyl      Other reaction(s): Vomiting  Other reaction(s): Nausea  Other reaction(s): Itching  swelling    White petrolatum-zinc     Zinc oxide-white petrolatum      Other reaction(s): Difficulty breathing    Latex, natural rubber Itching and Rash       Family History     Problem Relation (Age of Onset)    Cancer Mother (55), Father    Esophageal cancer Father    Heart disease Maternal Uncle    No Known Problems Brother, Brother, Sister, Maternal Aunt, Paternal Aunt, Paternal Uncle, Maternal Grandfather, Paternal Grandmother, Paternal Grandfather    Parkinsonism Maternal Grandmother    Tremor Maternal Grandmother          Social History Main  Topics    Smoking status: Never Smoker    Smokeless tobacco: Never Used    Alcohol use No      Comment: denies    Drug use: No    Sexual activity: No       Review of Systems   Constitutional: Negative.    HENT: Negative.    Eyes: Negative.    Respiratory: Negative.    Cardiovascular: Negative.    Gastrointestinal: Negative.    Genitourinary: Negative.    Musculoskeletal: Negative.    Skin: Negative.    Neurological: Positive for seizures and syncope.   Psychiatric/Behavioral: Negative.        Objective:     Temp:  [98.8 °F (37.1 °C)-99.8 °F (37.7 °C)] 99.8 °F (37.7 °C)  Pulse:  [48-58] 52  Resp:  [12-19] 14  SpO2:  [96 %-100 %] 99 %  BP: (116-144)/(52-79) 136/60     Body mass index is 20.36 kg/m².      Date 08/21/17 0700 - 08/22/17 0659   Shift 7527-4933 5564-5747 7026-5881 24 Hour Total   I  N  T  A  K  E   Shift Total  (mL/kg)       O  U  T  P  U  T   Urine  (mL/kg/hr) 900   900    Shift Total  (mL/kg) 900  (15.3)   900  (15.3)   Weight (kg) 59 59 59 59          Drains     Drain                 Suprapubic Catheter latex 48719 days                Physical Exam   Nursing note and vitals reviewed.  Constitutional: She is oriented to person, place, and time.   Chronically ill-appearing female in no acute distress   HENT:   Head: Normocephalic and atraumatic.   Eyes: Conjunctivae are normal. Pupils are equal, round, and reactive to light.   Neck: Normal range of motion. Neck supple.   Cardiovascular: Normal rate and regular rhythm.    Pulmonary/Chest: Effort normal. She has no wheezes.   Abdominal: Soft.   Suprapubic site intact no evidence of infection.  20 Czech Motley catheter exiting from the suprapubic site   Genitourinary:         Musculoskeletal: Normal range of motion.   Neurological: She is alert and oriented to person, place, and time.   Skin: Skin is warm and dry.     Psychiatric: She has a normal mood and affect. Her behavior is normal.       Significant Labs:    BMP:    Recent Labs  Lab 08/20/17  5428  08/21/17  0411    140   K 3.4* 3.2*    107   CO2 24 24   BUN 8 7   CREATININE 0.8 0.7   CALCIUM 9.7 9.1       CBC:    Recent Labs  Lab 08/20/17  2049   WBC 7.19   HGB 12.9   HCT 38.5          All pertinent labs results from the past 24 hours have been reviewed.    Significant Imaging:  All pertinent imaging results/findings from the past 24 hours have been reviewed. CT scan from July 2017 as per history of present illness

## 2017-08-21 NOTE — PLAN OF CARE
Problem: Patient Care Overview  Goal: Plan of Care Review  Outcome: Ongoing (interventions implemented as appropriate)  Reviewed plan of care with patient and family member. Patient verbalized understanding. AAOx3. Pt is hard of hearing bilaterally. IV fluids; NaCl infusing @ 150cc/hr. Orthostatics done during shift per orders. Pt complains of chronic back pain; repositioned and heat applied. Pt on continuous tele monitoring; SB-SR; HR 50s-60s. Bed alarm set, bed in lowest position, call bell in reach. Will continue to monitor.

## 2017-08-21 NOTE — HPI
Tasha Hawley is a 61 y.o. white woman with hypothyroidism, coronary artery disease, thoracic aortic atherosclerosis, severe left atrial enlargement, gastroesophageal reflux disease status post Nissen fundoplication, right facial droop since birth, history of work-related back injury in the 1990s with scoliosis, lumbar degenerative disc disease, lumbar facet arthropathy, history of multiple fusions and spacers from L3-S1, status post spinal cord stimulator and intrathecal hydromorphone pump, neurogenic bladder status post suprapubic catheter placement, lower extremity lymphedema, and depression.  She has difficulty ambulating at baseline.  She lives in [place], [place].  She is .  Her primary care physician is Dr. Mesfin Hodges.  Her pain management specialist is Dr. Nigel Hillman.  Her urologist is Dr. Shireen Mayo.  Her gastroenterologist is Dr. Prince Vance.  Her psychiatrist is Dr. Erik Oconnor.     She was taken to [facility] ED on 8/20/17 with possible seizure.  Her  stated that she slept until 3 pm, much later than usual.  She told her daughter she wasn't feeling well and she felt off mentally.   She was left with her granddaughter.  She called her daughter to come back because she felt ill and when her daughter returned she found her mother on the ground next to the swing.  She did note some jerking movements of her head.  EMS reports that she appeared postictal on their arrival.  She did clear and then had jerking movements of her head and left arm.  She was postictal for a couple of seconds and then cleared.  EMS stated she also appeared to pass out a couple of times and was bradycardic during these episodes.  She would then returned to baseline.  Her  reported that she had not been feeling well for several days and had diarrhea that had since resolved.  She had not been eating or drinking much.  Her hydromorphone dosage on her pump was increased by 3% 4  days prior. She was hospitalized at [facility] from 3/2/17-3/3/17 with a similar presentation also occuring after an increase in her hydromorphone dose.  She also takes lorazepam and hydrocodone-acetaminophen, and just received a new prescription for oxycodone-acetaminophen.  She was initially lethargic but fully oriented and communicative.  She had positive nitrite and moderate bacteria on her urinalysis.  Her last UTI was on 7/7/17 and was treated with 10 days of clindamycin and nitrofurantoin for E coli.  This time she was given IV ceftriaxone.  She was admitted to [facility].

## 2017-08-21 NOTE — ED NOTES
Pt c/o pain informed she received PRN percocet and when next dose it due. Pt requested anxiety medication. PRN xanax ordered will administer to pt.

## 2017-08-21 NOTE — ED NOTES
Pt. Sleeping in bed resp. Even and non labored. Cardiac monitor shows sinus bradycardia with HR-56. Skin PWD. Bed in low position and side rails elevated x 2. Call light in reach.

## 2017-08-21 NOTE — SUBJECTIVE & OBJECTIVE
Past Medical History:   Diagnosis Date    Anxiety     Arthritis     Bilateral lower extremity edema     severe chronic    Carotid artery occlusion     Cataract     Depression     Fever blister     Hypothyroid     Iron deficiency anemia     Lumbar radiculopathy     with chronic pain    Ocular migraine     Sleep apnea     cpap       Past Surgical History:   Procedure Laterality Date    BACK SURGERY      x 12    CATARACT EXTRACTION W/  INTRAOCULAR LENS IMPLANT Left     Dr Coleman     HYSTERECTOMY  1975    endometriosis    pain pump placement      SQ Dilaudid Pump managed by Dr. Hillman, Pain Management    SPINAL CORD STIMULATOR REMOVAL      before Larissa    SPINE SURGERY  5-13-13    CERVICAL FUSION       Review of patient's allergies indicates:   Allergen Reactions    (d)-limonene flavor      Other reaction(s): difficult intubation  Other reaction(s): Difficulty breathing    Bactrim [sulfamethoxazole-trimethoprim] Anaphylaxis    Imitrex [sumatriptan succinate] Shortness Of Breath    Penicillins Shortness Of Breath     Other reaction(s): Jittery    Topamax [topiramate] Shortness Of Breath    Vancomycin Shortness Of Breath     Rash    Butorphanol tartrate     Darvocet a500 [propoxyphene n-acetaminophen]      Other reaction(s): Difficulty breathing    Fentanyl      Other reaction(s): Vomiting  Other reaction(s): Nausea  Other reaction(s): Itching  swelling    White petrolatum-zinc     Zinc oxide-white petrolatum      Other reaction(s): Difficulty breathing    Latex, natural rubber Itching and Rash       No current facility-administered medications on file prior to encounter.      Current Outpatient Prescriptions on File Prior to Encounter   Medication Sig    acyclovir (ZOVIRAX) 400 MG tablet Take 1 tablet (400 mg total) by mouth 3 (three) times daily.    alprazolam (XANAX) 0.5 MG tablet Take 1 tablet (0.5 mg total) by mouth 2 (two) times daily as needed for Anxiety.    aspirin (ECOTRIN)  81 MG EC tablet Take 1 tablet (81 mg total) by mouth once daily.    atorvastatin (LIPITOR) 80 MG tablet Take 1 tablet (80 mg total) by mouth once daily. (Patient taking differently: Take 80 mg by mouth every evening. )    BUTALBITAL/ASPIRIN/CAFFEINE (FIORINAL ORAL) Take by mouth as needed.    DOC-Q-LACE 100 mg capsule TAKE ONE CAPSULE BY MOUTH THREE TIMES DAILY AS NEEDED FOR CONSTIPATION    fluoxetine (PROZAC) 20 MG capsule Take 3 capsules (60 mg total) by mouth once daily.    levothyroxine (SYNTHROID) 112 MCG tablet Take 1 tablet (112 mcg total) by mouth before breakfast.    ondansetron (ZOFRAN) 8 MG tablet Take 1 tablet (8 mg total) by mouth every 12 (twelve) hours as needed for Nausea.    oxybutynin (DITROPAN XL) 15 MG TR24 Take 1 tablet (15 mg total) by mouth once daily.    oxycodone-acetaminophen (PERCOCET)  mg per tablet     pantoprazole (PROTONIX) 40 MG tablet Take 1 tablet (40 mg total) by mouth once daily.    SENNOSIDES (SENNA LAX ORAL) Take by mouth as needed.    trazodone (DESYREL) 50 MG tablet Take 3 tablets (150 mg total) by mouth every evening.    [DISCONTINUED] (Magic mouthwash) 1:1:1 Benadryl 12.5mg/5ml liq, aluminum & magnesium hydroxide-simehticone (Maalox), lidocaine viscous 2% Swish and spit 5 mLs every 4 (four) hours as needed. for mouth sores    [DISCONTINUED] acetaminophen (TYLENOL) 500 MG tablet Take 500 mg by mouth every 6 (six) hours as needed for Pain.    [DISCONTINUED] azithromycin (Z-MILTON) 250 MG tablet Take 1 tablet (250 mg total) by mouth once daily. Take first 2 tablets together, then 1 every day until finished.    [DISCONTINUED] butalbital-acetaminop-caf-cod -98-30 mg Cap     [DISCONTINUED] ciprofloxacin HCl (CIPRO) 500 MG tablet     [DISCONTINUED] doxycycline (MONODOX) 100 MG capsule     [DISCONTINUED] fluconazole (DIFLUCAN) 100 MG tablet     [DISCONTINUED] hydrocodone-acetaminophen 10-325mg (NORCO)  mg Tab Take 1 tablet by mouth every 6 (six)  hours as needed.    [DISCONTINUED] hyoscyamine (ANASPAZ,LEVSIN) 0.125 mg Tab TAKE 1 TABLET (125 MCG TOTAL) BY MOUTH EVERY 6 (SIX) HOURS AS NEEDED.    [DISCONTINUED] lamotrigine (LAMICTAL) 25 MG tablet Take 1 tablet (25 mg total) by mouth once daily.    [DISCONTINUED] levothyroxine (SYNTHROID) 125 MCG tablet     [DISCONTINUED] LIDOCAINE VISCOUS 2 % solution     [DISCONTINUED] lidocaine-prilocaine (EMLA) cream     [DISCONTINUED] polyethylene glycol (GLYCOLAX) 17 gram PwPk Take 17 g by mouth 2 (two) times daily.    [DISCONTINUED] ranitidine (ZANTAC) 150 MG capsule Take 150 mg by mouth 2 (two) times daily.    [DISCONTINUED] VOLTAREN 1 % Gel      Family History     Problem Relation (Age of Onset)    Cancer Mother (55), Father    Esophageal cancer Father    Heart disease Maternal Uncle    No Known Problems Brother, Brother, Sister, Maternal Aunt, Paternal Aunt, Paternal Uncle, Maternal Grandfather, Paternal Grandmother, Paternal Grandfather    Parkinsonism Maternal Grandmother    Tremor Maternal Grandmother        Social History Main Topics    Smoking status: Never Smoker    Smokeless tobacco: Never Used    Alcohol use No      Comment: denies    Drug use: No    Sexual activity: No     Review of Systems   Constitutional: Positive for appetite change and fatigue. Negative for chills, diaphoresis and fever.   HENT: Negative for sore throat and trouble swallowing.    Eyes: Negative for photophobia and visual disturbance.   Respiratory: Negative for cough, shortness of breath and wheezing.    Cardiovascular: Negative for chest pain and palpitations.   Gastrointestinal: Negative for abdominal pain, constipation, diarrhea, nausea and vomiting.   Endocrine: Negative for polydipsia and polyphagia.   Genitourinary: Negative for decreased urine volume, dysuria, hematuria and urgency.   Musculoskeletal: Negative for joint swelling, neck pain and neck stiffness.   Neurological: Positive for dizziness, seizures (Possible),  weakness and light-headedness. Negative for numbness and headaches.   Psychiatric/Behavioral: Negative for agitation, dysphoric mood and suicidal ideas.     Objective:     Vital Signs (Most Recent):  Temp: 99.8 °F (37.7 °C) (08/21/17 0345)  Pulse: (!) 56 (08/21/17 0345)  Resp: 16 (08/21/17 0345)  BP: (!) 129/58 (08/21/17 0345)  SpO2: 96 % (08/21/17 0345) Vital Signs (24h Range):  Temp:  [98.8 °F (37.1 °C)-99.8 °F (37.7 °C)] 99.8 °F (37.7 °C)  Pulse:  [48-58] 56  Resp:  [16-19] 16  SpO2:  [96 %-100 %] 96 %  BP: (116-143)/(52-79) 129/58     Weight: 59 kg (130 lb)  Body mass index is 20.36 kg/m².    Physical Exam   Constitutional: She is oriented to person, place, and time. She appears lethargic. She is easily aroused. She has a sickly appearance. No distress.       HENT:   Head: Normocephalic and atraumatic.   Mouth/Throat: No oropharyngeal exudate.   Oropharynx dry   Eyes: Conjunctivae are normal. Pupils are equal, round, and reactive to light. No scleral icterus.   Neck: Neck supple.   Cardiovascular: Normal rate, regular rhythm, normal heart sounds and intact distal pulses.  Exam reveals no gallop and no friction rub.    No murmur heard.  Pulmonary/Chest: Effort normal and breath sounds normal. No respiratory distress. She has no wheezes. She has no rales.   Abdominal: Soft. Bowel sounds are normal. She exhibits no distension. There is no tenderness. There is no rebound and no guarding.   Musculoskeletal: She exhibits no edema, tenderness or deformity.   Neurological: She is oriented to person, place, and time and easily aroused. She appears lethargic. She exhibits normal muscle tone.   Skin: Skin is warm and dry. Capillary refill takes less than 2 seconds. No rash noted. She is not diaphoretic. There is pallor.   Psychiatric: She has a normal mood and affect. Her behavior is normal.   Nursing note and vitals reviewed.       Significant Labs:   CBC:   Recent Labs  Lab 08/20/17 2049   WBC 7.19   HGB 12.9   HCT 38.5         CMP:   Recent Labs  Lab 08/20/17 2049      K 3.4*      CO2 24   GLU 91   BUN 8   CREATININE 0.8   CALCIUM 9.7   PROT 7.4   ALBUMIN 4.0   BILITOT 0.8   ALKPHOS 123   AST 14   ALT 12   ANIONGAP 12   EGFRNONAA >60     Cardiac Markers: No results for input(s): CKMB, MYOGLOBIN, BNP, TROPISTAT in the last 48 hours.  Troponin:   Recent Labs  Lab 08/20/17 2049   TROPONINI <0.006     TSH:   Recent Labs  Lab 08/20/17 2049   TSH 5.063*     Status:  Final result    Ref Range & Units 08/20/17 2049   Acetaminophen (Tylenol), Serum 10.0 - 20.0 ug/mL <3.0          Ref Range & Units 08/20/17 2049   Salicylate Lvl 15.0 - 30.0 mg/dL <5.0      Status:  Final result    Ref Range & Units 08/20/17 2049   Alcohol, Medical, Serum <10 mg/dL <10        Status:  Final result    Ref Range & Units 08/20/17 2049   Acetaminophen (Tylenol), Serum 10.0 - 20.0 ug/mL <3.0         Urine Studies:   Recent Labs  Lab 08/20/17 2050   COLORU Yellow   APPEARANCEUA Clear   PHUR 7.0   SPECGRAV 1.010   PROTEINUA Negative   GLUCUA Negative   KETONESU Negative   BILIRUBINUA Negative   OCCULTUA 1+*   NITRITE Positive*   UROBILINOGEN Negative   LEUKOCYTESUR 2+*   RBCUA 2   WBCUA 8*   BACTERIA Moderate*   SQUAMEPITHEL 0       Significant Imaging: I have reviewed all pertinent imaging results/findings within the past 24 hours.   Imaging Results          CT Head Without Contrast (Final result)  Result time 08/20/17 21:41:25    Final result by Barb Rosado MD (08/20/17 21:41:25)                 Impression:        1.  No acute major vascular territory infarct or intracranial hemorrhage identified. Further evaluation/follow up as warranted.          Electronically signed by: BARB ROSADO MD, MD  Date:     08/20/17  Time:    21:41              Narrative:    COMPARISON: Head CT 5/14/17    FINDINGS: No evidence of hemorrhage, mass, midline shift or acute major vascular territory infarct.  Gray white interface appears intact.  Grossly stable  minimal periventricular nonspecific low attenuation throughout the supratentorial white matter. No definite new focal areas of abnormal parenchymal attenuation.  The ventricles are midline without distortion by mass effect.  No extra-axial hemorrhage or mass. Minimal atherosclerotic vascular calcifications at the cavernous ICAs.     The extracranial structures are within normal limits.  Calvarium is intact.  Visualized paranasal sinuses are clear.  Mastoid air cells are clear.                             X-Ray Chest 1 View (Final result)  Result time 08/20/17 20:46:32    Final result by Barb Rosado MD (08/20/17 20:46:32)                 Impression:        No radiographic acute intrathoracic process seen on this single view.      Electronically signed by: BARB ROSADO MD, MD  Date:     08/20/17  Time:    20:46              Narrative:    COMPARISON: Chest radiograph 5/20/17    FINDINGS: AP portable semiupright view of the chest. The patient is markedly rotated. The bilateral lungs are well expanded without large consolidationor new focal opacity.  No large pleural effusion or pneumothorax.  Cardiomediastinal silhouette appears grossly stable allowing for AP projection and rotation.  Lower cervical ACDF and lower thoracic spinal stimulator electrodes again noted. No acute osseous process seen.   PA and lateral views can be obtained.

## 2017-08-21 NOTE — PLAN OF CARE
Pt complaining of chronic back pain 9/10. States she uses lidocaine patch at home. Dr. Concepcion notified. New orders placed.

## 2017-08-21 NOTE — ED NOTES
"After starting PIV, pt stated "I'm not feeling too good" and then had a syncopal episode.  Episode lasted approx 1 minute, arousable to vigorous sternal rub.   "

## 2017-08-21 NOTE — CONSULTS
[facility]  Urology  Consult Note    Patient Name: Tasha Hawley  MRN: [id number]  Admission Date: 8/20/2017  Hospital Length of Stay: 0   Code Status: Full Code   Attending Provider: Kwame Concepcion MD   Consulting Provider: Maida Machado MD  Primary Care Physician: Mesfin Hodges Ii, MD  Principal Problem:Altered mental state    Urology  Consult performed by: MAIDA MACHADO  Consult ordered by: GERMÁN LEAVITT  Assessment/Recommendations: 20 Uzbek suprapubic tube replaced.    Patient should keep scheduled follow-up appointment with [facility] urology,            Subjective:     HPI:  Urology consult could to change suprapubic tube.  Patient admitted with syncope, altered mental status, and questionable seizure, and is empirically being treated for a catheter associated urinary tract infection    Patient has a history neurogenic bladder presumed secondary to multiple back surgeries and also history of strokes.  Patient followed by Dr. Mayo at [facility] urology, whose notes are reviewed.  The patient has had extensive urology workup including cystoscopy and urodynamics and had Botox injections in January of this year due to urge incontinence and bladder spasms.  Patient continues on oral anticholinergics.    Patient is seen in the emergency room awaiting placement to a hospital room.  She is currently lucid and able to give a history.  History obtained from her and also from the chart.    Patient states that home health changes the catheter approximately once a month and she thinks is been couple weeks since her last catheter change.  She states that she has a 20 Uzbek Motley catheter and that 30 cc is placed into the balloon.    Chart review shows a CT scan from July of this year showing no stones.  This image is reviewed by me and I agree with this finding.  Patient has grown multiple different organisms from her urine.    Past Medical History:   Diagnosis  Date    Anxiety     Arthritis     Bilateral lower extremity edema     severe chronic    Carotid artery occlusion     Cataract     Depression     Fever blister     Hypothyroid     Iron deficiency anemia     Lumbar radiculopathy     with chronic pain    Ocular migraine     Sleep apnea     cpap       Past Surgical History:   Procedure Laterality Date    BACK SURGERY      x 12    CATARACT EXTRACTION W/  INTRAOCULAR LENS IMPLANT Left     Dr Coleman     HYSTERECTOMY  1975    endometriosis    pain pump placement      SQ Dilaudid Pump managed by Dr. Hillman, Pain Management    SPINAL CORD STIMULATOR REMOVAL      before Larissa    SPINE SURGERY  5-13-13    CERVICAL FUSION       Review of patient's allergies indicates:   Allergen Reactions    (d)-limonene flavor      Other reaction(s): difficult intubation  Other reaction(s): Difficulty breathing    Bactrim [sulfamethoxazole-trimethoprim] Anaphylaxis    Imitrex [sumatriptan succinate] Shortness Of Breath    Penicillins Shortness Of Breath     Other reaction(s): Jittery    Topamax [topiramate] Shortness Of Breath    Vancomycin Shortness Of Breath     Rash    Butorphanol tartrate     Darvocet a500 [propoxyphene n-acetaminophen]      Other reaction(s): Difficulty breathing    Fentanyl      Other reaction(s): Vomiting  Other reaction(s): Nausea  Other reaction(s): Itching  swelling    White petrolatum-zinc     Zinc oxide-white petrolatum      Other reaction(s): Difficulty breathing    Latex, natural rubber Itching and Rash       Family History     Problem Relation (Age of Onset)    Cancer Mother (55), Father    Esophageal cancer Father    Heart disease Maternal Uncle    No Known Problems Brother, Brother, Sister, Maternal Aunt, Paternal Aunt, Paternal Uncle, Maternal Grandfather, Paternal Grandmother, Paternal Grandfather    Parkinsonism Maternal Grandmother    Tremor Maternal Grandmother          Social History Main Topics    Smoking status: Never  Smoker    Smokeless tobacco: Never Used    Alcohol use No      Comment: denies    Drug use: No    Sexual activity: No       Review of Systems   Constitutional: Negative.    HENT: Negative.    Eyes: Negative.    Respiratory: Negative.    Cardiovascular: Negative.    Gastrointestinal: Negative.    Genitourinary: Negative.    Musculoskeletal: Negative.    Skin: Negative.    Neurological: Positive for seizures and syncope.   Psychiatric/Behavioral: Negative.        Objective:     Temp:  [98.8 °F (37.1 °C)-99.8 °F (37.7 °C)] 99.8 °F (37.7 °C)  Pulse:  [48-58] 52  Resp:  [12-19] 14  SpO2:  [96 %-100 %] 99 %  BP: (116-144)/(52-79) 136/60     Body mass index is 20.36 kg/m².      Date 08/21/17 0700 - 08/22/17 0659   Shift 1984-0292 [id number] 8943-5912 24 Hour Total   I  N  T  A  K  E   Shift Total  (mL/kg)       O  U  T  P  U  T   Urine  (mL/kg/hr) 900   900    Shift Total  (mL/kg) 900  (15.3)   900  (15.3)   Weight (kg) 59 59 59 59          Drains     Drain                 Suprapubic Catheter latex 40040 days                Physical Exam   Nursing note and vitals reviewed.  Constitutional: She is oriented to person, place, and time.   Chronically ill-appearing female in no acute distress   HENT:   Head: Normocephalic and atraumatic.   Eyes: Conjunctivae are normal. Pupils are equal, round, and reactive to light.   Neck: Normal range of motion. Neck supple.   Cardiovascular: Normal rate and regular rhythm.    Pulmonary/Chest: Effort normal. She has no wheezes.   Abdominal: Soft.   Suprapubic site intact no evidence of infection.  20 Ivorian Motley catheter exiting from the suprapubic site   Genitourinary:         Musculoskeletal: Normal range of motion.   Neurological: She is alert and oriented to person, place, and time.   Skin: Skin is warm and dry.     Psychiatric: She has a normal mood and affect. Her behavior is normal.       Significant Labs:    BMP:    Recent Labs  Lab 08/20/17 2049 08/21/17  0411    140   K  3.4* 3.2*    107   CO2 24 24   BUN 8 7   CREATININE 0.8 0.7   CALCIUM 9.7 9.1       CBC:    Recent Labs  Lab 08/20/17 2049   WBC 7.19   HGB 12.9   HCT 38.5          All pertinent labs results from the past 24 hours have been reviewed.    Significant Imaging:  All pertinent imaging results/findings from the past 24 hours have been reviewed. CT scan from July 2017 as per history of present illness                    Assessment and Plan:     Syncope and collapse    As discussed under urinary tract infection associated with cystotomy catheter.        Urinary tract infection associated with cystostomy catheter    Agree with current therapy.  Given syncope and altered mental status it is reasonable to empirically treat her with antibiotics.  It should be kept in mind however that growth from an indwelling suprapubic tube most likely represents colonization as opposed to infection.        Neurogenic bladder    Presumed secondary to back surgeries and history of strokes.    The balloon to the patient's indwelling suprapubic tube is deflated and the suprapubic tube removed without difficulty.  Under sterile technique the suprapubic site is prepped  in the usual sterile fashion.  A 20 Bhutanese Motley catheter is placed in the suprapubic site and passes into the bladder without difficulty.  30 cc is placed in the balloon per patient request and the catheter is  placed to gravity drainage.    After discharge, home health should continue to change the catheter monthly basis.  Patient also should continue to follow up with  at [facility] urology.            VTE Risk Mitigation         Ordered     Medium Risk of VTE  Once      08/21/17 0659     Place sequential compression device  Until discontinued      08/21/17 0659          Thank you for your consult. I will sign off. Please contact us if you have any additional questions.    Matt Machado MD  Urology  [facility]

## 2017-08-21 NOTE — ASSESSMENT & PLAN NOTE
Syncope and Collapse  Patient presents via EMS with lethargy, concern for seizure activity per EMS with syncope.  Patient had been feeling poorly with decreased oral intake for the past 2 days.  Her Dilaudid infusion dosage was increased by 3% 4 days ago and she received a new prescription of Percocet 10, still with Norco 10 at home.  I suspect her AMS and Syncope is related to narcotic effects.    --Neuro checks ever 4 hours

## 2017-08-21 NOTE — ED PROVIDER NOTES
Encounter Date: 8/20/2017       History     Chief Complaint   Patient presents with    Seizures     [facility]. denies hx of seizures. AAO     61 year old female is brought in by EMS with possible seizure.  Family member states her mother slept til 3pm today, much latera than usual.  She told her daughter she wasn't feeling well and she felt off mentally.  Her daughter went by and fixed her something to eat.  She then left to get her young son.  Her mother called her and asked her to come back b/c she didn't feel well.  When she got back she found her mother on the ground next to the swing.  She did note some jerking movements of her head.  EMS reports that she appeared post ictal on their arrival.  She did clear and then had jerking movements of her head and left arm.  She was postictal for a couple of seconds and then cleared.  EMS states she also appeared to pass out a couple of times and was bradycardic during these episodes.  She would then returned to baseline.  Her daughter also reports that the pt has not been feeling well for several days and has been having diarrhea.  She has not been eating but has been drinking water and Sprite.  Her dilaudid pump was recently upped to 3%.  The daughter also reports that she has had episodes like this in the past.  This is not her baseline but it also not a new or isolated occurrence.              Review of patient's allergies indicates:   Allergen Reactions    (d)-limonene flavor      Other reaction(s): difficult intubation  Other reaction(s): Difficulty breathing    Bactrim [sulfamethoxazole-trimethoprim] Anaphylaxis    Imitrex [sumatriptan succinate] Shortness Of Breath    Penicillins Shortness Of Breath     Other reaction(s): Jittery    Topamax [topiramate] Shortness Of Breath    Vancomycin Shortness Of Breath     Rash    Butorphanol tartrate     Darvocet a500 [propoxyphene n-acetaminophen]       Other reaction(s): Difficulty breathing    Fentanyl      Other reaction(s): Vomiting  Other reaction(s): Nausea  Other reaction(s): Itching  swelling    White petrolatum-zinc     Zinc oxide-white petrolatum      Other reaction(s): Difficulty breathing    Latex, natural rubber Itching and Rash     Past Medical History:   Diagnosis Date    Anxiety     Arthritis     Bilateral lower extremity edema     severe chronic    Carotid artery occlusion     Cataract     Depression     Fever blister     Hypothyroid     Iron deficiency anemia     Lumbar radiculopathy     with chronic pain    Ocular migraine     Sleep apnea     cpap     Past Surgical History:   Procedure Laterality Date    BACK SURGERY      x 12    CATARACT EXTRACTION W/  INTRAOCULAR LENS IMPLANT Left     Dr Coleman     HYSTERECTOMY  1975    endometriosis    pain pump placement      SQ Dilaudid Pump managed by Dr. Hillman, Pain Management    SPINAL CORD STIMULATOR REMOVAL      before [place]    SPINE SURGERY  5-13-13    CERVICAL FUSION     Family History   Problem Relation Age of Onset    Cancer Mother 55     breast    Cancer Father      esophagus,had laryngectomy    Esophageal cancer Father     Parkinsonism Maternal Grandmother     Tremor Maternal Grandmother     No Known Problems Brother     No Known Problems Brother     Heart disease Maternal Uncle     No Known Problems Sister     No Known Problems Maternal Aunt     No Known Problems Paternal Aunt     No Known Problems Paternal Uncle     No Known Problems Maternal Grandfather     No Known Problems Paternal Grandmother     No Known Problems Paternal Grandfather     Melanoma Neg Hx     Amblyopia Neg Hx     Blindness Neg Hx     Cataracts Neg Hx     Diabetes Neg Hx     Glaucoma Neg Hx     Hypertension Neg Hx     Macular degeneration Neg Hx     Retinal detachment Neg Hx     Strabismus Neg Hx     Stroke Neg Hx     Thyroid disease Neg Hx     Colon cancer Neg Hx   "    Social History   Substance Use Topics    Smoking status: Never Smoker    Smokeless tobacco: Never Used    Alcohol use No      Comment: denies     Review of Systems   Constitutional: Positive for appetite change.   Gastrointestinal: Positive for diarrhea.       Physical Exam     Initial Vitals [08/20/17 2042]   BP Pulse Resp Temp SpO2   116/79 (!) 55 19 98.8 °F (37.1 °C) 97 %      MAP       91.33         Physical Exam    Nursing note and vitals reviewed.  Constitutional: She appears well-developed and well-nourished. No distress.   Patient is laying on her left side in the bed with eyes closed, resists eye opening.  She had an episode jerking her head for about 5 rhythm.  I called her name and tried to open her eyes and she did not respond but still resisted eye opening.  When stimulated with rectal thermometer she opened her eyes and asked "what are you doing with my butt?"   HENT:   Head: Normocephalic and atraumatic.   Mouth/Throat: Oropharynx is clear and moist.   Eyes: Conjunctivae are normal. Pupils are equal, round, and reactive to light.   Neck: Normal range of motion.   Cardiovascular: Bradycardia present.    No murmur heard.  Pulmonary/Chest: Breath sounds normal. She has no wheezes. She has no rhonchi. She has no rales.   Abdominal: Soft. Bowel sounds are normal. She exhibits no distension. There is no tenderness.   Musculoskeletal: Normal range of motion. She exhibits no edema or tenderness.   Neurological: She has normal strength. GCS eye subscore is 4. GCS verbal subscore is 5. GCS motor subscore is 6.   Alert and oriented x 3, will stop talking mid-sentence and "fall asleep"  Resists eye opening with these episodes   Skin: Skin is warm and dry.         ED Course   Procedures  Labs Reviewed   CBC W/ AUTO DIFFERENTIAL - Abnormal; Notable for the following:        Result Value    MCV 81 (*)     Gran% 77.2 (*)     Lymph% 14.3 (*)     All other components within normal limits   COMPREHENSIVE " METABOLIC PANEL - Abnormal; Notable for the following:     Potassium 3.4 (*)     All other components within normal limits   SALICYLATE LEVEL - Abnormal; Notable for the following:     Salicylate Lvl <5.0 (*)     All other components within normal limits   ACETAMINOPHEN LEVEL - Abnormal; Notable for the following:     Acetaminophen (Tylenol), Serum <3.0 (*)     All other components within normal limits   DRUG SCREEN PANEL, URINE EMERGENCY - Abnormal; Notable for the following:     Creatinine, Random Ur 14.9 (*)     All other components within normal limits   TSH - Abnormal; Notable for the following:     TSH 5.063 (*)     All other components within normal limits   URINALYSIS - Abnormal; Notable for the following:     Occult Blood UA 1+ (*)     Nitrite, UA Positive (*)     Leukocytes, UA 2+ (*)     All other components within normal limits   URINALYSIS MICROSCOPIC - Abnormal; Notable for the following:     WBC, UA 8 (*)     Bacteria, UA Moderate (*)     All other components within normal limits   CULTURE, URINE   CULTURE, BLOOD   CULTURE, BLOOD   TROPONIN I   CK   ALCOHOL,MEDICAL (ETHANOL)   AMMONIA   LACTIC ACID, PLASMA   T4, FREE   POCT GLUCOSE MONITORING CONTINUOUS     EKG Readings: (Independently Interpreted)   Rhythm: Sinus Bradycardia. Heart Rate: 45. Ectopy: No Ectopy. ST Segments: Normal ST Segments. T Waves: Normal. Axis: Right Axis Deviation. Other Findings: Prolonged QT Interval. Clinical Impression: Sinus Bradycardia          Medical Decision Making:   History:   I obtained history from: EMS provider and someone other than patient.  Old Medical Records: I decided to obtain old medical records.  Old Records Summarized: records from previous admission(s).       <> Summary of Records: I reviewed several old EKGs and she has hx of bradycardia with HR in the 40s.  Independently Interpreted Test(s):   I have ordered and independently interpreted EKG Reading(s) - see prior notes  Clinical Tests:   Lab Tests:  Ordered and Reviewed  Radiological Study: Ordered and Reviewed  Medical Tests: Ordered and Reviewed  ED Management:  61F with possible seizure activity, possible syncope.  No cause identified on ER work up.  She does have UTI but has suprapubic catheter - will treat with rocephin but likely colonized.  Family reports AMS with previous UTIs.    Admit on tele with neuro checks.    Admission discussed with [facility].  Other:   I have discussed this case with another health care provider.                   ED Course     Clinical Impression:   The primary encounter diagnosis was Seizure. Diagnoses of Syncope, unspecified syncope type and Urinary tract infection associated with catheterization of urinary tract, unspecified indwelling urinary catheter type, initial encounter were also pertinent to this visit.                           Josy Woodruff MD  08/20/17 6476

## 2017-08-21 NOTE — ASSESSMENT & PLAN NOTE
Presumed secondary to back surgeries and history of strokes.    The balloon to the patient's indwelling suprapubic tube is deflated and the suprapubic tube removed without difficulty.  Under sterile technique the suprapubic site is prepped  in the usual sterile fashion.  A 20 [place] Motley catheter is placed in the suprapubic site and passes into the bladder without difficulty.  30 cc is placed in the balloon per patient request and the catheter is  placed to gravity drainage.    After discharge, home health should continue to change the catheter monthly basis.  Patient also should continue to follow up with  at [facility] urology.

## 2017-08-21 NOTE — ED NOTES
"APPEARANCE: Alert, oriented and in no acute distress.  CARDIAC: Normal rate and rhythm, no murmur heard.   PERIPHERAL VASCULAR: peripheral pulses present. Normal cap refill. No edema. Warm to touch.    RESPIRATORY:Normal rate and effort, breath sounds clear bilaterally throughout chest. Respirations are equal and unlabored no obvious signs of distress.  GASTRO: soft, bowel sounds normal, no tenderness, no abdominal distention.  MUSC: Full ROM. No bony tenderness or soft tissue tenderness. No obvious deformity.  SKIN: Skin is warm and dry, normal skin turgor, mucous membranes moist.  NEURO: 5/5 strength major flexors/extensors bilaterally. Sensory intact to light touch bilaterally. Jessica coma scale: eyes open spontaneously-4, oriented & converses-5, obeys commands-6. No neurological abnormalities.  Family reports "witnessed seizure like activity. " pt does not have history of seizures. Upon arrival pt displayed " seizure like activity" however is resistant when opening lids and assess pupils. Pt denies any history of seizures.   MENTAL STATUS: awake, alert and aware of environment.  EYE: PERRL, both eyes: pupils brisk and reactive to light. Normal size.  ENT: EARS: no obvious drainage. NOSE: no active bleeding.   : supra pubic catheter no redness noted. Urine clear.     "

## 2017-08-21 NOTE — HPI
Urology consult could to change suprapubic tube.  Patient admitted with syncope, altered mental status, and questionable seizure, and is empirically being treated for a catheter associated urinary tract infection    Patient has a history neurogenic bladder presumed secondary to multiple back surgeries and also history of strokes.  Patient followed by Dr. Mayo at [facility] urology, whose notes are reviewed.  The patient has had extensive urology workup including cystoscopy and urodynamics and had Botox injections in January of this year due to urge incontinence and bladder spasms.  Patient continues on oral anticholinergics.    Patient is seen in the emergency room awaiting placement to a hospital room.  She is currently lucid and able to give a history.  History obtained from her and also from the chart.    Patient states that home health changes the catheter approximately once a month and she thinks is been couple weeks since her last catheter change.  She states that she has a 20 [place] Motley catheter and that 30 cc is placed into the balloon.    Chart review shows a CT scan from July of this year showing no stones.  This image is reviewed by me and I agree with this finding.  Patient has grown multiple different organisms from her urine.

## 2017-08-21 NOTE — ASSESSMENT & PLAN NOTE
Osteoarthritis of spine with radiculopathy, lumbar region  Narcotic Dependence, S/P insertion of intrathecal pump,   Continue Home Medication regimin as follows:  --Fiorinal as needed for headache  --Oxycodone  mg TID prn Breakthrough Pain  --Hydromorphone infusion via implantable pump.  Should contact Dr. Hillman's office for immediate follow-up to adjust dosage, as symptoms started after increase in dosage.

## 2017-08-21 NOTE — ED NOTES
Pt requesting to sit on edge of bed. Upon sitting to near standing pt very unsteady. Educated pt on fall and repositioned in bed. Side rails up and daughter at bedside.

## 2017-08-21 NOTE — ASSESSMENT & PLAN NOTE
Agree with current therapy.  Given syncope and altered mental status it is reasonable to empirically treat her with antibiotics.  It should be kept in mind however that growth from an indwelling suprapubic tube most likely represents colonization as opposed to infection.

## 2017-08-22 PROBLEM — M51.36 DEGENERATIVE DISC DISEASE, LUMBAR: Chronic | Status: ACTIVE | Noted: 2017-03-31

## 2017-08-22 PROBLEM — R55 SYNCOPE AND COLLAPSE: Status: RESOLVED | Noted: 2017-03-02 | Resolved: 2017-08-22

## 2017-08-22 PROBLEM — M51.369 DEGENERATIVE DISC DISEASE, LUMBAR: Chronic | Status: ACTIVE | Noted: 2017-03-31

## 2017-08-22 PROBLEM — M51.369 DEGENERATIVE DISC DISEASE, LUMBAR: Chronic | Status: RESOLVED | Noted: 2017-03-31 | Resolved: 2017-08-22

## 2017-08-22 PROBLEM — R41.82 ALTERED MENTAL STATE: Status: RESOLVED | Noted: 2017-08-20 | Resolved: 2017-08-22

## 2017-08-22 PROBLEM — E03.9 PRIMARY HYPOTHYROIDISM: Chronic | Status: ACTIVE | Noted: 2017-02-02

## 2017-08-22 PROBLEM — Z98.890 S/P INSERTION OF INTRATHECAL PUMP: Chronic | Status: ACTIVE | Noted: 2017-03-31

## 2017-08-22 PROBLEM — M51.369 DEGENERATIVE DISC DISEASE, LUMBAR: Status: RESOLVED | Noted: 2017-03-31 | Resolved: 2017-08-22

## 2017-08-22 PROBLEM — M47.26 OSTEOARTHRITIS OF SPINE WITH RADICULOPATHY, LUMBAR REGION: Chronic | Status: ACTIVE | Noted: 2017-03-31

## 2017-08-22 PROBLEM — R56.9 SEIZURE: Status: RESOLVED | Noted: 2017-08-21 | Resolved: 2017-08-22

## 2017-08-22 PROBLEM — M51.36 DEGENERATIVE DISC DISEASE, LUMBAR: Chronic | Status: RESOLVED | Noted: 2017-03-31 | Resolved: 2017-08-22

## 2017-08-22 PROBLEM — M51.36 DEGENERATIVE DISC DISEASE, LUMBAR: Status: RESOLVED | Noted: 2017-03-31 | Resolved: 2017-08-22

## 2017-08-22 LAB
ANION GAP SERPL CALC-SCNC: 5 MMOL/L
BASOPHILS # BLD AUTO: 0.01 K/UL
BASOPHILS NFR BLD: 0.2 %
BUN SERPL-MCNC: 5 MG/DL
CALCIUM SERPL-MCNC: 8.2 MG/DL
CHLORIDE SERPL-SCNC: 111 MMOL/L
CO2 SERPL-SCNC: 26 MMOL/L
CREAT SERPL-MCNC: 0.6 MG/DL
DIFFERENTIAL METHOD: ABNORMAL
EOSINOPHIL # BLD AUTO: 0.1 K/UL
EOSINOPHIL NFR BLD: 3.4 %
ERYTHROCYTE [DISTWIDTH] IN BLOOD BY AUTOMATED COUNT: 13.9 %
EST. GFR  (AFRICAN AMERICAN): >60 ML/MIN/1.73 M^2
EST. GFR  (NON AFRICAN AMERICAN): >60 ML/MIN/1.73 M^2
GLUCOSE SERPL-MCNC: 87 MG/DL
HCT VFR BLD AUTO: 30.8 %
HGB BLD-MCNC: 10 G/DL
LYMPHOCYTES # BLD AUTO: 2.2 K/UL
LYMPHOCYTES NFR BLD: 52.7 %
MAGNESIUM SERPL-MCNC: 1.8 MG/DL
MCH RBC QN AUTO: 27 PG
MCHC RBC AUTO-ENTMCNC: 32.5 G/DL
MCV RBC AUTO: 83 FL
MONOCYTES # BLD AUTO: 0.4 K/UL
MONOCYTES NFR BLD: 9.4 %
NEUTROPHILS # BLD AUTO: 1.4 K/UL
NEUTROPHILS NFR BLD: 34.3 %
PHOSPHATE SERPL-MCNC: 3.1 MG/DL
PLATELET # BLD AUTO: 163 K/UL
PMV BLD AUTO: 10.5 FL
POTASSIUM SERPL-SCNC: 3.5 MMOL/L
RBC # BLD AUTO: 3.71 M/UL
SODIUM SERPL-SCNC: 142 MMOL/L
WBC # BLD AUTO: 4.14 K/UL

## 2017-08-22 PROCEDURE — 36415 COLL VENOUS BLD VENIPUNCTURE: CPT

## 2017-08-22 PROCEDURE — 94761 N-INVAS EAR/PLS OXIMETRY MLT: CPT

## 2017-08-22 PROCEDURE — G8979 MOBILITY GOAL STATUS: HCPCS | Mod: CJ

## 2017-08-22 PROCEDURE — 97530 THERAPEUTIC ACTIVITIES: CPT

## 2017-08-22 PROCEDURE — 97161 PT EVAL LOW COMPLEX 20 MIN: CPT

## 2017-08-22 PROCEDURE — 25000003 PHARM REV CODE 250: Performed by: HOSPITALIST

## 2017-08-22 PROCEDURE — G8978 MOBILITY CURRENT STATUS: HCPCS | Mod: CK

## 2017-08-22 PROCEDURE — 80048 BASIC METABOLIC PNL TOTAL CA: CPT

## 2017-08-22 PROCEDURE — 11000001 HC ACUTE MED/SURG PRIVATE ROOM

## 2017-08-22 PROCEDURE — 97116 GAIT TRAINING THERAPY: CPT

## 2017-08-22 PROCEDURE — 85025 COMPLETE CBC W/AUTO DIFF WBC: CPT

## 2017-08-22 PROCEDURE — 25000003 PHARM REV CODE 250: Performed by: NURSE PRACTITIONER

## 2017-08-22 PROCEDURE — 83735 ASSAY OF MAGNESIUM: CPT

## 2017-08-22 PROCEDURE — 84100 ASSAY OF PHOSPHORUS: CPT

## 2017-08-22 RX ORDER — CEPHALEXIN 500 MG/1
500 CAPSULE ORAL EVERY 12 HOURS
Status: DISCONTINUED | OUTPATIENT
Start: 2017-08-22 | End: 2017-08-23 | Stop reason: HOSPADM

## 2017-08-22 RX ORDER — CEPHALEXIN 500 MG/1
500 CAPSULE ORAL EVERY 12 HOURS
Qty: 20 CAPSULE | Refills: 0 | Status: SHIPPED | OUTPATIENT
Start: 2017-08-22 | End: 2017-09-01

## 2017-08-22 RX ORDER — SODIUM CHLORIDE 9 MG/ML
INJECTION, SOLUTION INTRAVENOUS CONTINUOUS
Status: DISCONTINUED | OUTPATIENT
Start: 2017-08-22 | End: 2017-08-23 | Stop reason: HOSPADM

## 2017-08-22 RX ADMIN — LIDOCAINE 1 PATCH: 50 PATCH TOPICAL at 08:08

## 2017-08-22 RX ADMIN — OXYCODONE HYDROCHLORIDE AND ACETAMINOPHEN 1 TABLET: 10; 325 TABLET ORAL at 11:08

## 2017-08-22 RX ADMIN — SODIUM CHLORIDE: 0.9 INJECTION, SOLUTION INTRAVENOUS at 05:08

## 2017-08-22 RX ADMIN — PANTOPRAZOLE SODIUM 40 MG: 40 TABLET, DELAYED RELEASE ORAL at 08:08

## 2017-08-22 RX ADMIN — OXYCODONE HYDROCHLORIDE AND ACETAMINOPHEN 1 TABLET: 10; 325 TABLET ORAL at 12:08

## 2017-08-22 RX ADMIN — ASPIRIN 81 MG: 81 TABLET, COATED ORAL at 08:08

## 2017-08-22 RX ADMIN — FLUOXETINE 60 MG: 20 CAPSULE ORAL at 08:08

## 2017-08-22 RX ADMIN — LEVOTHYROXINE SODIUM 112 MCG: 112 TABLET ORAL at 05:08

## 2017-08-22 RX ADMIN — CEPHALEXIN 500 MG: 500 CAPSULE ORAL at 01:08

## 2017-08-22 RX ADMIN — OXYBUTYNIN CHLORIDE 15 MG: 5 TABLET, FILM COATED, EXTENDED RELEASE ORAL at 08:08

## 2017-08-22 RX ADMIN — OXYCODONE HYDROCHLORIDE AND ACETAMINOPHEN 1 TABLET: 10; 325 TABLET ORAL at 05:08

## 2017-08-22 RX ADMIN — CEPHALEXIN 500 MG: 500 CAPSULE ORAL at 08:08

## 2017-08-22 RX ADMIN — SODIUM CHLORIDE: 0.9 INJECTION, SOLUTION INTRAVENOUS at 01:08

## 2017-08-22 RX ADMIN — DOCUSATE SODIUM 100 MG: 100 CAPSULE, LIQUID FILLED ORAL at 08:08

## 2017-08-22 RX ADMIN — OXYCODONE HYDROCHLORIDE AND ACETAMINOPHEN 1 TABLET: 10; 325 TABLET ORAL at 08:08

## 2017-08-22 RX ADMIN — ATORVASTATIN CALCIUM 80 MG: 40 TABLET, FILM COATED ORAL at 08:08

## 2017-08-22 RX ADMIN — OXYCODONE HYDROCHLORIDE AND ACETAMINOPHEN 1 TABLET: 10; 325 TABLET ORAL at 04:08

## 2017-08-22 RX ADMIN — ALPRAZOLAM 0.5 MG: 0.25 TABLET ORAL at 09:08

## 2017-08-22 RX ADMIN — TRAZODONE HYDROCHLORIDE 150 MG: 100 TABLET ORAL at 08:08

## 2017-08-22 NOTE — PROGRESS NOTES
[facility]  Progress Note    Patient Name: Tasha Hawley  MRN: [id number]  Patient Class: IP- Inpatient   Admission Date: 8/20/2017  Length of Stay: 1 days  Attending Physician: Isaias Anderson MD  Primary Care Provider: Mesfin Hodges Ii, MD        Subjective:     Principal Problem:Altered mental state    HPI:  Tasha Hawley is a 61 y.o. white woman with hypothyroidism, coronary artery disease, thoracic aortic atherosclerosis, severe left atrial enlargement, gastroesophageal reflux disease status post Nissen fundoplication, right facial droop since birth, history of work-related back injury in the 1990s with scoliosis, lumbar degenerative disc disease, lumbar facet arthropathy, history of multiple fusions and spacers from L3-S1, status post spinal cord stimulator and intrathecal hydromorphone pump, neurogenic bladder status post suprapubic catheter placement, lower extremity lymphedema, and depression.  She has difficulty ambulating at baseline.  She lives in [place], [place].  She is .  Her primary care physician is Dr. Mesfin Hodges.  Her pain management specialist is Dr. Nigel Hillman.  Her urologist is Dr. Shireen Mayo.  Her gastroenterologist is Dr. Prince Vance.  Her psychiatrist is Dr. Erik Oconnor.     She was taken to [facility] ED on 8/20/17 with possible seizure.  Her  stated that she slept until 3 pm, much later than usual.  She told her daughter she wasn't feeling well and she felt off mentally.   She was left with her granddaughter.  She called her daughter to come back because she felt ill and when her daughter returned she found her mother on the ground next to the swing.  She did note some jerking movements of her head.  EMS reports that she appeared postictal on their arrival.  She did clear and then had jerking movements of her head and left arm.  She was postictal for a couple of seconds and then cleared.  EMS  stated she also appeared to pass out a couple of times and was bradycardic during these episodes.  She would then returned to baseline.  Her  reported that she had not been feeling well for several days and had diarrhea that had since resolved.  She had not been eating or drinking much.  Her hydromorphone dosage on her pump was increased by 3% 4 days prior. She was hospitalized at [facility] from 3/2/17-3/3/17 with a similar presentation also occuring after an increase in her hydromorphone dose.  She also takes lorazepam and hydrocodone-acetaminophen, and just received a new prescription for oxycodone-acetaminophen.  She was initially lethargic but fully oriented and communicative.  She had positive nitrite and moderate bacteria on her urinalysis.  She was given IV ceftriaxone.  She was admitted to [facility].      Hospital Course:  She was orthostatic so was given IV saline.  Urine output was greater than fluid intake.  Orthostatic hypotension resolved on 8/21/17.  She reported right shoulder pain after a fall a couple of weeks prior, and x-ray showed no fracture.  She was concerned about bradycardia (pulse 40s-50s) but clinic notes indicate that her pulse is always like this.  Urine culture grew >100,000 cfu/mL E coli.  Her last UTI on 7/7/17 was due to E coli resistant to ciprofloxacin and tetracycline.      Interval History: No acute events.  Just does not feel right.    Review of Systems   Constitutional: Negative for chills and fever.   Respiratory: Negative for cough and shortness of breath.      Objective:     Vital Signs (Most Recent):  Temp: 97.8 °F (36.6 °C) (08/22/17 1158)  Pulse: (!) 45 (08/22/17 1158)  Resp: 18 (08/22/17 1158)  BP: (!) 102/53 (08/22/17 1158)  SpO2: 96 % (08/22/17 0922) Vital Signs (24h Range):  Temp:  [97.4 °F (36.3 °C)-98.7 °F (37.1 °C)] 97.8 °F (36.6 °C)  Pulse:  [45-61] 45  Resp:  [16-18] 18  SpO2:  [96 %-100 %] 96 %  BP: (102-141)/(53-70)  102/53     Weight: 59 kg (130 lb)  Body mass index is 20.36 kg/m².    Intake/Output Summary (Last 24 hours) at 08/22/17 1235  Last data filed at 08/22/17 0900   Gross per 24 hour   Intake             2605 ml   Output             3300 ml   Net             -695 ml      Physical Exam   Constitutional: She is oriented to person, place, and time. She appears well-developed. No distress.   Cardiovascular: Regular rhythm.  Bradycardia present.    Pulmonary/Chest: Effort normal. No respiratory distress.   Musculoskeletal: She exhibits edema.   Neurological: She is alert and oriented to person, place, and time.   Psychiatric: She has a normal mood and affect.   Nursing note and vitals reviewed.      Significant Labs: All pertinent labs within the past 24 hours have been reviewed.    Significant Imaging: I have reviewed all pertinent imaging results/findings within the past 24 hours.   CT Head Without Contrast 8/20/17: No evidence of hemorrhage, mass, midline shift or acute major vascular territory infarct.  Gray white interface appears intact.  Grossly stable minimal periventricular nonspecific low attenuation throughout the supratentorial white matter. No definite new focal areas of abnormal parenchymal attenuation.  The ventricles are midline without distortion by mass effect.  No extra-axial hemorrhage or mass. Minimal atherosclerotic vascular calcifications at the cavernous ICAs.   The extracranial structures are within normal limits.  Calvarium is intact.  Visualized paranasal sinuses are clear.  Mastoid air cells are clear.  2D echo with color flow doppler 8/21/17:     1 - Normal left ventricular systolic function (EF 60-65%).     2 - Impaired LV relaxation, normal LAP (grade 1 diastolic dysfunction).     3 - Normal right ventricular systolic function .     4 - Eccentric hypertrophy.     5 - Severe left atrial enlargement.   X-Ray Shoulder Trauma 3 view Right 8/22/17: There is diffuse osteopenia.  There is no evidence  of acute fracture, dislocation, or bone destruction.  There is mild hypertrophic degenerative change at the acromioclavicular joint.    Assessment/Plan:      Primary hypothyroidism    TSH 5.063. Continue home levothyroxine 112 mcg daily          Urinary tract infection associated with cystostomy catheter    Neurogenic bladder  Tolerated ceftriaxone without adverse effects.  Prescribe course of cephalexin.            Neurogenic bladder              Coronary artery disease involving native coronary artery of native heart without angina pectoris    Continue aspirin.          Chronic pain    Narcotic dependency, continuous  S/p insertion of intrathecal pump  Osteoarthritis of spine with radiculopathy, lumbar region  Continue Fiorinal, oxycodone, hydromorphone pump.        Major depressive disorder, single episode    Continue home alprazolam 0.5 mg BID          Iron deficiency anemia    Hgb 12.9, Hct 38.5. Above baseline. Evidence of dehydration. Monitor            VTE Risk Mitigation         Ordered     Medium Risk of VTE  Once      08/21/17 0659     Place sequential compression device  Until discontinued      08/21/17 0659        Disposition: Home with home health.    Addendum: Still orthostatic.  Continue IV fluids.  Consult PT/OT.      Isaias Anderson MD  Department of Hospital Medicine   [facility]

## 2017-08-22 NOTE — PLAN OF CARE
Problem: Patient Care Overview  Goal: Plan of Care Review  Outcome: Ongoing (interventions implemented as appropriate)  Reviewed plan of care with patient. Patient verbalized understanding. AAOx3. Pt on cardiac diet; poor appetite. Worked with PT/OT this afternoon. IV fluids; NaCl infusing at 150cc/hr. PRN pain med given q4h for complaints of chronic pain to back. Repositions self. Patient on continuous tele monitoring; SB-SR; HR 40s-60s. Bed alarm set, bed in lowest position, call bell in reach. Will continue to monitor.

## 2017-08-22 NOTE — PROGRESS NOTES
.Pharmacy New Medication Education    Patient accepted medication education.    Pharmacy educated patient on name and purpose of medications and possible side effects, using the teach-back method.   Lidocaine  zofran      Learners of pharmacy medication education included:  patient    Patient +/- learner response:  teachback

## 2017-08-22 NOTE — PLAN OF CARE
to  for DC today.  Future Appointments  Date Time Provider Department Center   8/31/2017 10:00 AM Mesfin Hodges II, MD [facility]   8/31/2017 11:00 AM Erik Oconnor MD [facility]   9/7/2017 10:30 AM Prince Vance MD [facility] GASTRO [facility]   Accepted by [facility]  For .  Follow-up With  Details  Why  Contact Info   Mesfin Hodges II, MD  Go on 8/31/2017  @10am, PM appointments not available for Hospital Follow up appointments  [address]  [facility] [address]  [phone]   Erik Oconnor MD  On 8/31/2017    [address] [facility] [address]  [phone]   Prince Vance MD  On 9/7/2017  @10:30am  [address]  [facility] [address]  [phone]   [facility]      [address] [facility] [address]  [phone]               08/22/17 1326   Final Note   Assessment Type Final Discharge Note   Discharge Disposition Home-Health   What phone number can be called within the next 1-3 days to see how you are doing after discharge? [phone]   Hospital Follow Up  Appt(s) scheduled? Yes   Discharge plans and expectations educations in teach back method with documentation complete? Yes   Right Care Referral Info   Post Acute Recommendation Home-care

## 2017-08-22 NOTE — PLAN OF CARE
Problem: Patient Care Overview  Goal: Plan of Care Review  Plan of care reviewed with pt. Pt verbalizes understanding. Bed in lowest position, side rails up times 2, wheels locked, nonslip socks on, and bed alarm on. Call bell w/in reach. Instructed to call for needs/assist oob. Pt given prn percocet per md order for c/o chronic back pain. Notified by pt and  that pt fell a couple of weeks ago and has had r shoulder pain since fall. RBill NP notified and xray of shoulder order for in am. cna attempted to do orthos for shift. Pt refused to lye down for orthostatics secondary to pain. Bill LEE NP notified. Pt on telemetry. SB mid to upper 40's when sleeping to SB 50's-SR 70's when awake. NP aware. Not true red alarms noted. Will continue to monitor.

## 2017-08-22 NOTE — SUBJECTIVE & OBJECTIVE
Interval History: No acute events.  Just does not feel right.    Review of Systems   Constitutional: Negative for chills and fever.   Respiratory: Negative for cough and shortness of breath.      Objective:     Vital Signs (Most Recent):  Temp: 97.8 °F (36.6 °C) (08/22/17 1158)  Pulse: (!) 45 (08/22/17 1158)  Resp: 18 (08/22/17 1158)  BP: (!) 102/53 (08/22/17 1158)  SpO2: 96 % (08/22/17 0922) Vital Signs (24h Range):  Temp:  [97.4 °F (36.3 °C)-98.7 °F (37.1 °C)] 97.8 °F (36.6 °C)  Pulse:  [45-61] 45  Resp:  [16-18] 18  SpO2:  [96 %-100 %] 96 %  BP: (102-141)/(53-70) 102/53     Weight: 59 kg (130 lb)  Body mass index is 20.36 kg/m².    Intake/Output Summary (Last 24 hours) at 08/22/17 1235  Last data filed at 08/22/17 0900   Gross per 24 hour   Intake             2605 ml   Output             3300 ml   Net             -695 ml      Physical Exam   Constitutional: She is oriented to person, place, and time. She appears well-developed. No distress.   Cardiovascular: Regular rhythm.  Bradycardia present.    Pulmonary/Chest: Effort normal. No respiratory distress.   Musculoskeletal: She exhibits edema.   Neurological: She is alert and oriented to person, place, and time.   Psychiatric: She has a normal mood and affect.   Nursing note and vitals reviewed.      Significant Labs: All pertinent labs within the past 24 hours have been reviewed.    Significant Imaging: I have reviewed all pertinent imaging results/findings within the past 24 hours.   CT Head Without Contrast 8/20/17: No evidence of hemorrhage, mass, midline shift or acute major vascular territory infarct.  Gray white interface appears intact.  Grossly stable minimal periventricular nonspecific low attenuation throughout the supratentorial white matter. No definite new focal areas of abnormal parenchymal attenuation.  The ventricles are midline without distortion by mass effect.  No extra-axial hemorrhage or mass. Minimal atherosclerotic vascular calcifications  at the cavernous ICAs.   The extracranial structures are within normal limits.  Calvarium is intact.  Visualized paranasal sinuses are clear.  Mastoid air cells are clear.  2D echo with color flow doppler 8/21/17:     1 - Normal left ventricular systolic function (EF 60-65%).     2 - Impaired LV relaxation, normal LAP (grade 1 diastolic dysfunction).     3 - Normal right ventricular systolic function .     4 - Eccentric hypertrophy.     5 - Severe left atrial enlargement.   X-Ray Shoulder Trauma 3 view Right 8/22/17: There is diffuse osteopenia.  There is no evidence of acute fracture, dislocation, or bone destruction.  There is mild hypertrophic degenerative change at the acromioclavicular joint.

## 2017-08-22 NOTE — PLAN OF CARE
Problem: Physical Therapy Goal  Goal: Physical Therapy Goal  Goals to be met by: 17     Patient will increase functional independence with mobility by performin. Supine <> sit with Modified Hall  2. Sit to stand transfer with Supervision  3. Bed to chair transfer with Stand-by Assistance using Rolling Walker  4. Gait  x 50 feet with Stand-by Assistance using Rolling Walker.   5. Stand for 5 minutes with Supervision using Rolling Walker  6. Lower extremity exercise program x 10 reps per handout, with supervision    Outcome: Ongoing (interventions implemented as appropriate)  PT evaluation completed today and pt will benefit from skilled PT services to address pt's functional limitations and barriers.

## 2017-08-22 NOTE — DISCHARGE SUMMARY
[facility] Hospital Medicine  Discharge Summary      Patient Name: Tasha Hawley  MRN: [id number]  Admission Date: 8/20/2017  Hospital Length of Stay: 2 days  Discharge Date and Time: 8/23/2017  1:49 PM  Attending Physician: Isaias Anderson MD   Discharging Provider: Isaias Anderson MD  Primary Care Provider: Mesfin Hodges Ii, MD      HPI:   Tasha Hawley is a 61 y.o. white woman with hypothyroidism, coronary artery disease, thoracic aortic atherosclerosis, severe left atrial enlargement, gastroesophageal reflux disease status post Nissen fundoplication, right facial droop since birth, history of work-related back injury in the 1990s with scoliosis, lumbar degenerative disc disease, lumbar facet arthropathy, history of multiple fusions and spacers from L3-S1, status post spinal cord stimulator and intrathecal hydromorphone pump, neurogenic bladder status post suprapubic catheter placement, lower extremity lymphedema, and depression.  She has difficulty ambulating at baseline.  She lives in [place], [place].  She is .  Her primary care physician is Dr. Mesfin Hodges.  Her pain management specialist is Dr. Nigel Hillman.  Her urologist is Dr. Shireen Mayo.  Her gastroenterologist is Dr. Prince Vance.  Her psychiatrist is Dr. Erik Oconnor.     She was taken to [facility] ED on 8/20/17 with possible seizure.  Her  stated that she slept until 3 pm, much later than usual.  She told her daughter she wasn't feeling well and she felt off mentally.   She was left with her granddaughter.  She called her daughter to come back because she felt ill and when her daughter returned she found her mother on the ground next to the swing.  She did note some jerking movements of her head.  EMS reports that she appeared postictal on their arrival.  She did clear and then had jerking movements of her head and left arm.  She was postictal for a couple of seconds and then  cleared.  EMS stated she also appeared to pass out a couple of times and was bradycardic during these episodes.  She would then returned to baseline.  Her  reported that she had not been feeling well for several days and had diarrhea that had since resolved.  She had not been eating or drinking much.  Her hydromorphone dosage on her pump was increased by 3% 4 days prior. She was hospitalized at [facility] from 3/2/17-3/3/17 with a similar presentation also occuring after an increase in her hydromorphone dose.  She also takes lorazepam and hydrocodone-acetaminophen, and just received a new prescription for oxycodone-acetaminophen.  She was initially lethargic but fully oriented and communicative.  She had positive nitrite and moderate bacteria on her urinalysis.  Her last UTI was on 7/7/17 and was treated with 10 days of clindamycin and nitrofurantoin for E coli.  This time she was given IV ceftriaxone.  She was admitted to [facility].     Indwelling Lines/Drains at time of discharge:   Lines/Drains/Airways     Drain                 Suprapubic Catheter 08/20/17 latex 2 days              Hospital Course:   She was orthostatic so was given IV saline.  Urine output was greater than fluid intake.  She reported right shoulder pain after a fall a couple of weeks prior, and x-ray showed no fracture.  She was concerned about bradycardia (pulse 40s-50s) but clinic notes indicate that her pulse is always like this.  Urologist Dr. Matt Machado changed her suprapubic catheter.  Urine culture grew >100,000 cfu/mL E coli with the same sensitivities as the E coli on 7/7/17, which was resistant to ciprofloxacin and tetracycline.  Her prior UTI may have been unsuccessfully treated because the AMEE for nitrofurantoin was </= 32.  Ceftriaxone was changed to cephalexin.  She continued to be orthostatic and was kept another night.  She still had severe back pain and headache on 8/23/17 but  felt that she could go home.  She was prescribed cephalexin for 10 days and home health.     Consults:   Consults         Status Ordering Provider     Urology  Once     Provider:  MD Kaley Alfaro ROSANNE M.          Significant Diagnostic Studies:   CT Head Without Contrast 8/20/17: No evidence of hemorrhage, mass, midline shift or acute major vascular territory infarct.  Gray white interface appears intact.  Grossly stable minimal periventricular nonspecific low attenuation throughout the supratentorial white matter. No definite new focal areas of abnormal parenchymal attenuation.  The ventricles are midline without distortion by mass effect.  No extra-axial hemorrhage or mass. Minimal atherosclerotic vascular calcifications at the cavernous ICAs.   The extracranial structures are within normal limits.  Calvarium is intact.  Visualized paranasal sinuses are clear.  Mastoid air cells are clear.  2D echo with color flow doppler 8/21/17:     1 - Normal left ventricular systolic function (EF 60-65%).     2 - Impaired LV relaxation, normal LAP (grade 1 diastolic dysfunction).     3 - Normal right ventricular systolic function .     4 - Eccentric hypertrophy.     5 - Severe left atrial enlargement.   X-Ray Shoulder Trauma 3 view Right 8/22/17: There is diffuse osteopenia.  There is no evidence of acute fracture, dislocation, or bone destruction.  There is mild hypertrophic degenerative change at the acromioclavicular joint.    Final Active Diagnoses:    Diagnosis Date Noted POA    Urinary tract infection associated with cystostomy catheter [T83.510A, N39.0] 11/06/2016 Yes    S/P insertion of intrathecal pump [Z98.890] 03/31/2017 Not Applicable     Chronic    Osteoarthritis of spine with radiculopathy, lumbar region [M47.26] 03/31/2017 Yes     Chronic    Lymphedema of both lower extremities [I89.0] 03/02/2017 Yes     Chronic    Primary hypothyroidism [E03.9] 02/02/2017 Yes     Chronic     Neurogenic bladder [N31.9] 09/27/2016 Yes     Chronic    GERD (gastroesophageal reflux disease) [K21.9] 08/16/2016 Yes    Coronary artery disease involving native coronary artery of native heart without angina pectoris [I25.10] 08/16/2016 Yes     Chronic    Narcotic dependency, continuous [F11.20] 07/18/2014 Yes     Chronic    Chronic pain [G89.29] 05/01/2013 Yes     Chronic    Major depressive disorder, single episode [F32.9] 02/21/2013 Yes    Iron deficiency anemia [D50.9]  Yes      Problems Resolved During this Admission:    Diagnosis Date Noted Date Resolved POA    PRINCIPAL PROBLEM:  Altered mental state [R41.82] 08/20/2017 08/22/2017 Yes    Seizure [R56.9] 08/21/2017 08/22/2017 Yes    Syncope and collapse [R55] 03/02/2017 08/22/2017 Yes      Discharged Condition: good    Disposition: Home-Health Care [facility]    Follow Up:  Follow-up Information     Mesfin Hodges Ii, MD. Go on 8/31/2017.    Specialty:  Internal Medicine  Why:  @10am, PM appointments not available for Hospital Follow up appointments  Contact information:  [address]  [facility] [address]  [phone]             Erik Oconnor MD On 8/31/2017.    Specialty:  Psychiatry  Contact information:  [address]  [facility] [address]  [phone]             Prince Vance MD On 9/7/2017.    Specialty:  Gastroenterology  Why:  @10:30am  Contact information:  [address] [facility] [address]  [phone]             [facility].    Specialty:  Home Health Services  Contact information:  [address] [facility] [address]  [phone]                 Patient Instructions:     Ambulatory referral to Outpatient Case Management   Referral Priority: Routine Referral Type: Consultation   Referral Reason: Specialty Services Required    Number of Visits Requested: 1      Diet general     Activity as tolerated     Call MD for:  persistent dizziness, light-headedness, or visual disturbances     Call MD for:   increased confusion or weakness       Medications:  Reconciled Home Medications:   Current Discharge Medication List      START taking these medications    Details   cephALEXin (KEFLEX) 500 MG capsule Take 1 capsule (500 mg total) by mouth every 12 (twelve) hours.  Qty: 20 capsule, Refills: 0         CONTINUE these medications which have NOT CHANGED    Details   alprazolam (XANAX) 0.5 MG tablet Take 1 tablet (0.5 mg total) by mouth 2 (two) times daily as needed for Anxiety.  Qty: 60 tablet, Refills: 0      aspirin (ECOTRIN) 81 MG EC tablet Take 1 tablet (81 mg total) by mouth once daily.  Refills: 0      atorvastatin (LIPITOR) 80 MG tablet Take 1 tablet (80 mg total) by mouth once daily.  Qty: 90 tablet, Refills: 3      BUTALBITAL/ASPIRIN/CAFFEINE (FIORINAL ORAL) Take by mouth as needed.      DOC-Q-LACE 100 mg capsule TAKE ONE CAPSULE BY MOUTH THREE TIMES DAILY AS NEEDED FOR CONSTIPATION  Qty: 90 capsule, Refills: 2      fluoxetine (PROZAC) 20 MG capsule Take 3 capsules (60 mg total) by mouth once daily.  Qty: 60 capsule, Refills: 2      levothyroxine (SYNTHROID) 112 MCG tablet Take 1 tablet (112 mcg total) by mouth before breakfast.  Qty: 90 tablet, Refills: 3    Associated Diagnoses: Primary hypothyroidism      ondansetron (ZOFRAN) 8 MG tablet Take 1 tablet (8 mg total) by mouth every 12 (twelve) hours as needed for Nausea.  Qty: 60 tablet, Refills: 3    Associated Diagnoses: Nausea      oxybutynin (DITROPAN XL) 15 MG TR24 Take 1 tablet (15 mg total) by mouth once daily.  Qty: 30 tablet, Refills: 6    Associated Diagnoses: Bladder spasm      oxycodone-acetaminophen (PERCOCET)  mg per tablet       pantoprazole (PROTONIX) 40 MG tablet Take 1 tablet (40 mg total) by mouth once daily.  Qty: 90 tablet, Refills: 3      SENNOSIDES (SENNA LAX ORAL) Take by mouth as needed.      trazodone (DESYREL) 50 MG tablet Take 3 tablets (150 mg total) by mouth every evening.  Qty: 90 tablet, Refills: 3    Associated Diagnoses:  Insomnia, unspecified type         STOP taking these medications       acyclovir (ZOVIRAX) 400 MG tablet Comments:   Reason for Stopping:         lamotrigine (LAMICTAL) 25 MG tablet Comments:   Reason for Stopping:             HOS POC IP DISCHARGE SUMMARY    Isaias Anderson MD  Department of Hospital Medicine  [facility]

## 2017-08-22 NOTE — DISCHARGE INSTRUCTIONS
DIZZINESS OR FAINTING, POSSIBLE CAUSES OF (ENGLISH) View Edit Remove  NEAR SYNCOPE, UNKNOWN (ENGLISH) View Edit Remove  CEPHALEXIN TABLETS OR CAPSULES (ENGLISH) View Edit Remove

## 2017-08-22 NOTE — PLAN OF CARE
Nurse and PCT assisted pt to stand up to assess ability to walk per DC orders. Upon sitting up in bed, pt stated her head is spinning. After few minutes of sitting up in bed, pt insisted to stand to attempt to walk. 2 person assist to stand; pt took two steps and was wobbling, stated she is dizzy. PCT Melissa and nurse assisted pt back into bed. Dr. Anderson notified that pt is not ready for DC at this time; discharge orders discontinued. PT/OT orders placed.

## 2017-08-22 NOTE — PLAN OF CARE
[facility] HOME HEALTH ORDERS  FACE TO FACE ENCOUNTER    Patient Name: Tasha Hawley  YOB: 1955    PCP: Mesfin Hodges Ii, MD   PCP Address: [address] / [place] [address]  PCP Phone Number: [phone]  PCP Fax: [phone]    Encounter Date: 08/22/2017    Admit to Home Health    Diagnoses:  Active Hospital Problems    Diagnosis  POA    S/P insertion of intrathecal pump [Z98.890]  Not Applicable     Chronic    Osteoarthritis of spine with radiculopathy, lumbar region [M47.26]  Yes     Chronic    Lymphedema of both lower extremities [I89.0]  Yes     Chronic    Primary hypothyroidism [E03.9]  Yes     Chronic    Urinary tract infection associated with cystostomy catheter [T83.510A, N39.0]  Yes    Neurogenic bladder [N31.9]  Yes     Chronic    GERD (gastroesophageal reflux disease) [K21.9]  Yes    Coronary artery disease involving native coronary artery of native heart without angina pectoris [I25.10]  Yes     Chronic    Narcotic dependency, continuous [F11.20]  Yes     Chronic    Chronic pain [G89.29]  Yes     Chronic    Major depressive disorder, single episode [F32.9]  Yes    Iron deficiency anemia [D50.9]  Yes      Resolved Hospital Problems    Diagnosis Date Resolved POA    *Altered mental state [R41.82] 08/22/2017 Yes    Seizure [R56.9] 08/22/2017 Yes    Syncope and collapse [R55] 08/22/2017 Yes       Future Appointments  Date Time Provider Department Center   8/24/2017 10:40 AM Mesfin Hodges II, MD [facility]   8/31/2017 10:00 AM Mesfin Hodges II, MD [facility]   8/31/2017 11:00 AM Erik Oconnor MD [facility] PSY[facility]   9/7/2017 10:30 AM Prince Vance MD [facility] GASTR[facility]     Follow-up Information     Mesfin Hodges Ii, MD. Go on 8/31/2017.    Specialty:  Internal Medicine  Why:  @10am, PM appointments not available for Hospital Follow up appointments  Contact information:  [address]  [facility] [address]  [phone]                     I have seen and examined this patient face to face today. My clinical findings that support the need for the home health skilled services and home bound status are the following:  Weakness/numbness causing balance and gait disturbance due to Weakness/Debility and degenerative joint disease of the spine making it taxing to leave home.    Allergies:  Review of patient's allergies indicates:   Allergen Reactions    (d)-limonene flavor      Other reaction(s): difficult intubation  Other reaction(s): Difficulty breathing    Bactrim [sulfamethoxazole-trimethoprim] Anaphylaxis    Imitrex [sumatriptan succinate] Shortness Of Breath    Penicillins Shortness Of Breath     Other reaction(s): Jittery    Topamax [topiramate] Shortness Of Breath    Vancomycin Shortness Of Breath     Rash    Butorphanol tartrate     Darvocet a500 [propoxyphene n-acetaminophen]      Other reaction(s): Difficulty breathing    Fentanyl      Other reaction(s): Vomiting  Other reaction(s): Nausea  Other reaction(s): Itching  swelling    White petrolatum-zinc     Zinc oxide-white petrolatum      Other reaction(s): Difficulty breathing    Latex, natural rubber Itching and Rash       Diet: regular diet    Activities: activity as tolerated    Nursing:   SN to complete comprehensive assessment including routine vital signs. Instruct on disease process and s/s of complications to report to MD. Review/verify medication list sent home with the patient at time of discharge  and instruct patient/caregiver as needed. Frequency may be adjusted depending on start of care date.    Notify MD if SBP > 160 or < 90; DBP > 90 or < 50; HR > 120 or < 50; Temp > 101      CONSULTS:    Physical Therapy to evaluate and treat. Evaluate for home safety and equipment needs; Establish/upgrade home exercise program. Perform / instruct on therapeutic exercises, gait training, transfer training, and Range of  Motion.  Occupational Therapy to evaluate and treat. Evaluate home environment for safety and equipment needs. Perform/Instruct on transfers, ADL training, ROM, and therapeutic exercises.    MISCELLANEOUS CARE:  Suprapubic catheter Care: Instruct patient/caregiver to empty catheter bag.  Change catheter every month.    WOUND CARE ORDERS  n/a      Medications: Review discharge medications with patient and family and provide education.      Current Discharge Medication List      START taking these medications    Details   cephALEXin (KEFLEX) 500 MG capsule Take 1 capsule (500 mg total) by mouth every 12 (twelve) hours.  Qty: 20 capsule, Refills: 0         CONTINUE these medications which have NOT CHANGED    Details   alprazolam (XANAX) 0.5 MG tablet Take 1 tablet (0.5 mg total) by mouth 2 (two) times daily as needed for Anxiety.  Qty: 60 tablet, Refills: 0      aspirin (ECOTRIN) 81 MG EC tablet Take 1 tablet (81 mg total) by mouth once daily.  Refills: 0      atorvastatin (LIPITOR) 80 MG tablet Take 1 tablet (80 mg total) by mouth once daily.  Qty: 90 tablet, Refills: 3      BUTALBITAL/ASPIRIN/CAFFEINE (FIORINAL ORAL) Take by mouth as needed.      DOC-Q-LACE 100 mg capsule TAKE ONE CAPSULE BY MOUTH THREE TIMES DAILY AS NEEDED FOR CONSTIPATION  Qty: 90 capsule, Refills: 2      fluoxetine (PROZAC) 20 MG capsule Take 3 capsules (60 mg total) by mouth once daily.  Qty: 60 capsule, Refills: 2      levothyroxine (SYNTHROID) 112 MCG tablet Take 1 tablet (112 mcg total) by mouth before breakfast.  Qty: 90 tablet, Refills: 3    Associated Diagnoses: Primary hypothyroidism      ondansetron (ZOFRAN) 8 MG tablet Take 1 tablet (8 mg total) by mouth every 12 (twelve) hours as needed for Nausea.  Qty: 60 tablet, Refills: 3    Associated Diagnoses: Nausea      oxybutynin (DITROPAN XL) 15 MG TR24 Take 1 tablet (15 mg total) by mouth once daily.  Qty: 30 tablet, Refills: 6    Associated Diagnoses: Bladder spasm       oxycodone-acetaminophen (PERCOCET)  mg per tablet       pantoprazole (PROTONIX) 40 MG tablet Take 1 tablet (40 mg total) by mouth once daily.  Qty: 90 tablet, Refills: 3      SENNOSIDES (SENNA LAX ORAL) Take by mouth as needed.      trazodone (DESYREL) 50 MG tablet Take 3 tablets (150 mg total) by mouth every evening.  Qty: 90 tablet, Refills: 3    Associated Diagnoses: Insomnia, unspecified type         STOP taking these medications       acyclovir (ZOVIRAX) 400 MG tablet Comments:   Reason for Stopping:         lamotrigine (LAMICTAL) 25 MG tablet Comments:   Reason for Stopping:               I certify that this patient is confined to her home and needs physical therapy and occupational therapy.      Isaias Anderson MD  [facility]  08/22/2017

## 2017-08-22 NOTE — PLAN OF CARE
08/22/17 1326   Final Note   Assessment Type Final Discharge Note   Discharge Disposition Home-Health   What phone number can be called within the next 1-3 days to see how you are doing after discharge? [phone]   Hospital Follow Up  Appt(s) scheduled? Yes   Discharge plans and expectations educations in teach back method with documentation complete? Yes   Right Care Referral Info   Post Acute Recommendation Home-care

## 2017-08-22 NOTE — PLAN OF CARE
Patient dependent on DME, lives with supportive  who drives.  Prefers PM appointments.  Follow-up With  Details  Why  Contact Info   Mesfin Hodges II, MD  Go on 8/31/2017  @10am, PM appointments not available for Hospital Follow up appointments  [address]  [facility] [address]  [phone]             08/22/17 1224   Discharge Assessment   Assessment Type Discharge Planning Assessment   Confirmed/corrected address and phone number on facesheet? Yes   Assessment information obtained from? Patient   Expected Length of Stay (days) 2   Communicated expected length of stay with patient/caregiver yes   Prior to hospitilization cognitive status: Alert/Oriented   Prior to hospitalization functional status: Assistive Equipment;Needs Assistance   Current cognitive status: Alert/Oriented   Current Functional Status: Assistive Equipment;Needs Assistance   Arrived From home or self-care   Lives With spouse   Able to Return to Prior Arrangements yes   Is patient able to care for self after discharge? Unable to determine at this time (comments)   How many people do you have in your home that can help with your care after discharge? 1   Who are your caregiver(s) and their phone number(s)? Dangelo Hawley Spouse [phone]    Patient's perception of discharge disposition home or selfcare   Readmission Within The Last 30 Days no previous admission in last 30 days   Patient currently being followed by outpatient case management? No  (referral entered)   Patient currently receives home health services? No   Does the patient currently use HME? Yes   Patient currently receives private duty nursing? No   Patient currently receives any other outside agency services? No   Equipment Currently Used at Home bedside commode;wheelchair;rollator;shower chair;grab bar   Do you have any problems affording any of your prescribed medications? No   Is the patient taking medications as prescribed? yes   Do you  have any financial concerns preventing you from receiving the healthcare you need? No   Does the patient have transportation to healthcare appointments? Yes   Transportation Available family or friend will provide   On Dialysis? No   Does the patient receive services at the Coumadin Clinic? No   Are there any open cases? No   Discharge Plan A Home with family   Discharge Plan B Home with family;Home Health   Patient/Family In Agreement With Plan yes

## 2017-08-22 NOTE — PT/OT/SLP EVAL
Physical Therapy  Evaluation    Tasha Hawley   MRN: [id number]   Admitting Diagnosis: Altered mental state    PT Received On: 08/22/17  PT Start Time: 1415     PT Stop Time: 1451    PT Total Time (min): 36 min       Billable Minutes:  Evaluation 10, Gait Training 10 and Therapeutic Activity 16    Diagnosis: Altered mental state  The primary encounter diagnosis was Seizure. Diagnoses of Syncope, unspecified syncope type, Urinary tract infection associated with catheterization of urinary tract, unspecified indwelling urinary catheter type, initial encounter, Altered mental state, Syncope, and Osteoarthritis of spine with radiculopathy, lumbar region were also pertinent to this visit.    Past Medical History:   Diagnosis Date    Anxiety     Arthritis     Bilateral lower extremity edema     severe chronic    Carotid artery occlusion     Cataract     Depression     Fever blister     Hypothyroid     Iron deficiency anemia     Lumbar radiculopathy     with chronic pain    Ocular migraine     Sleep apnea     cpap      Past Surgical History:   Procedure Laterality Date    BACK SURGERY      x 12    CATARACT EXTRACTION W/  INTRAOCULAR LENS IMPLANT Left     Dr Coleman     HYSTERECTOMY  1975    endometriosis    pain pump placement      SQ Dilaudid Pump managed by Dr. Hillman, Pain Management    SPINAL CORD STIMULATOR REMOVAL      before [place]    SPINE SURGERY  5-13-13    CERVICAL FUSION       Referring physician: Dr. Anderson  Date referred to PT: 8/22/2017    General Precautions: Standard, fall  Orthopedic Precautions: N/A   Braces: N/A       Do you have any cultural, spiritual, Sabianist conflicts, given your current situation?: none reported to PT    Patient History:  Living Environment Comment: Pt reports living withher  in a H with 1 step to enter through threshold. Pt reports she only ambulates short household distances with rollator and occasionally requires assist from her . Pt has  tub/show with TTB and reports only taking sponge baths recently. Pt's  assists with dressing and pt is able to complete toileting mod I with BSC. Pt does not drive and states she helps with cooking standing/sitting with rollator.  Equipment Currently Used at Home: bedside commode, wheelchair, walker, rolling, rollator (TTB, power w/c)  DME owned (not currently used): none    Previous Level of Function:  Ambulation Skills: needs device and assist  Transfer Skills: needs device and assist  ADL Skills: needs device and assist    Subjective:  Communicated with REJI Vang prior to session.  Pt reports she is not doing too great today and that she got dizzy when she sat EOB with nursing earlier. Pt is agreeable to participate in therapy session and wants to try to ambulate.  Chief Complaint: lower back pain and dizziness upon sitting up.  Patient goals: to increase mobility and strength to d/c home    Pain/Comfort  Pain Rating 1: 8/10  Location - Side 1: Bilateral  Location - Orientation 1: lower  Location 1: back  Pain Addressed 1: Reposition, Distraction, Nurse notified  Pain Rating Post-Intervention 1: 8/10      Objective:   Patient found with: peripheral IV, bed alarm     Cognitive Exam:  Oriented to: Person, Place, Time and Situation    Follows Commands/attention: Follows two-step commands  Communication: mumbled speech but able to understand  Safety awareness/insight to disability: impaired    Physical Exam:  Postural examination/scapula alignment: Rounded shoulder, Head forward and Posterior pelvic tilt    Skin integrity: LE reddness under socks  Edema: Moderate BLEs    Sensation:   Impaired  BLEs from hips down to feet    Lower Extremity Range of Motion:  Right Lower Extremity: limited by edema  Left Lower Extremity: limited by edema    Lower Extremity Strength:  Right Lower Extremity: Deficits: grossly 3+/5 to 4-/5  Left Lower Extremity: Deficits: grossly 3+/5 to 4-/5    Functional Mobility:  Bed  Mobility:  Rolling/Turning Right: With side rail, Supervision  Scooting/Bridging: Supervision  Supine to Sit: Supervision, With side rail  Sit to Supine: Minimum Assistance (to raise RLE into bed)    Transfers:  Sit <> Stand Assistance: Contact Guard Assistance  Sit <> Stand Assistive Device: Rolling Walker  Bed <> Chair Assistance: Activity did not occur  Toilet Transfer Assistance: Activity Did not occur    Gait:   Gait Distance: Pt ambulated ~15 feet with RW with CGA over level surface. Pt ambulates at significantly decreased cj with flexed posture over RW, decreased step length B, and decreased foot clearance B.  Assistance 1: Contact Guard Assistance  Gait Assistive Device: Rolling walker  Gait Deviation(s): decreased cj, increased time in double stance, decreased velocity of limb motion, decreased step length    Stairs:  Activity did not occur    Balance:   Static Sit: FAIR+: Able to take MINIMAL challenges from all directions  Dynamic Sit: FAIR+: Maintains balance through MINIMAL excursions of active trunk motion  Static Stand: FAIR: Maintains without assist but unable to take challenges  Dynamic stand: FAIR: Needs CONTACT GUARD during gait    Therapeutic Activities and Exercises:  PT evaluation completed today. Pt with reports of dizziness upon sitting up and standing with nursing today and therefore orthostatic completed and recorded in pt's chart.   Supine with HOB elevated: 114/56 mmHg, 44 bpm, SaO2 99% on RA.  Seated EOB: 115/53 mmHg, 46 bpm, 100% on RA.  Standin/72 mmHg, 53 bpm, 98% on RA.  Pt reports dizziness upon sitting but reports she wants to stand and ambulate and had no further complaints. Pt instructed in gait training as reported above.    AM-PAC 6 CLICK MOBILITY  How much help from another person does this patient currently need?   1 = Unable, Total/Dependent Assistance  2 = A lot, Maximum/Moderate Assistance  3 = A little, Minimum/Contact Guard/Supervision  4 = None, Modified  [place]/Independent    Turning over in bed (including adjusting bedclothes, sheets and blankets)?: 3  Sitting down on and standing up from a chair with arms (e.g., wheelchair, bedside commode, etc.): 3  Moving from lying on back to sitting on the side of the bed?: 3  Moving to and from a bed to a chair (including a wheelchair)?: 3  Need to walk in hospital room?: 3  Climbing 3-5 steps with a railing?: 2  Total Score: 17     AM-PAC Raw Score CMS G-Code Modifier Level of Impairment Assistance   6 % Total / Unable   7 - 9 CM 80 - 100% Maximal Assist   10 - 14 CL 60 - 80% Moderate Assist   15 - 19 CK 40 - 60% Moderate Assist   20 - 22 CJ 20 - 40% Minimal Assist   23 CI 1-20% SBA / CGA   24 CH 0% Independent/ Mod I     Patient left right sidelying with HOB elevated with all lines intact, call button in reach, bed alarm on and RN notified.    Assessment:   Tasha Hawley is a 61 y.o. female with a medical diagnosis of Altered mental state and presents with LE edema/weakness, impaired sensation, lower back pain, dizziness upon sitting up, and limited activity tolerance. Recommend continued skilled PT during IP stay to address pt's functional limitations. Recommend d/c home with 24 hr supervision/assist and HH PT/OT.    Rehab identified problem list/impairments: Rehab identified problem list/impairments: weakness, impaired endurance, impaired sensation, impaired self care skills, impaired functional mobilty, gait instability, impaired balance, decreased lower extremity function, pain, impaired skin, edema, impaired cardiopulmonary response to activity    Rehab potential is fair.    Activity tolerance: Fair    Discharge recommendations: Discharge Facility/Level Of Care Needs: home with home health     Barriers to discharge: Barriers to Discharge: Decreased caregiver support    Equipment recommendations: Equipment Needed After Discharge: none     GOALS:    Physical Therapy Goals        Problem: Physical  Therapy Goal    Goal Priority Disciplines Outcome Goal Variances Interventions   Physical Therapy Goal     PT/OT, PT Ongoing (interventions implemented as appropriate)     Description:  Goals to be met by: 17     Patient will increase functional independence with mobility by performin. Supine <> sit with Modified [place]  2. Sit to stand transfer with Supervision  3. Bed to chair transfer with Stand-by Assistance using Rolling Walker  4. Gait  x 50 feet with Stand-by Assistance using Rolling Walker.   5. Stand for 5 minutes with Supervision using Rolling Walker  6. Lower extremity exercise program x 10 reps per handout, with supervision                      PLAN:    Patient to be seen 6 x/week to address the above listed problems via gait training, therapeutic activities, therapeutic exercises  Plan of Care expires: 17  Plan of Care reviewed with: patient    Functional Assessment Tool Used: AMPAC  Score: 17  Functional Limitation: Mobility: Walking and moving around  Mobility: Walking and Moving Around Current Status (): CK  Mobility: Walking and Moving Around Goal Status (): STEVEN Wilson, PT  2017

## 2017-08-22 NOTE — ASSESSMENT & PLAN NOTE
Narcotic dependency, continuous  S/p insertion of intrathecal pump  Osteoarthritis of spine with radiculopathy, lumbar region  Continue Fiorinal, oxycodone, hydromorphone pump.

## 2017-08-22 NOTE — ASSESSMENT & PLAN NOTE
Neurogenic bladder  Tolerated ceftriaxone without adverse effects.  Prescribe course of cephalexin.

## 2017-08-23 VITALS
HEART RATE: 55 BPM | DIASTOLIC BLOOD PRESSURE: 65 MMHG | BODY MASS INDEX: 20.4 KG/M2 | TEMPERATURE: 98 F | HEIGHT: 67 IN | SYSTOLIC BLOOD PRESSURE: 134 MMHG | RESPIRATION RATE: 18 BRPM | WEIGHT: 130 LBS | OXYGEN SATURATION: 96 %

## 2017-08-23 PROCEDURE — G8988 SELF CARE GOAL STATUS: HCPCS | Mod: CK

## 2017-08-23 PROCEDURE — G8989 SELF CARE D/C STATUS: HCPCS | Mod: CK

## 2017-08-23 PROCEDURE — 25000003 PHARM REV CODE 250: Performed by: NURSE PRACTITIONER

## 2017-08-23 PROCEDURE — 97535 SELF CARE MNGMENT TRAINING: CPT

## 2017-08-23 PROCEDURE — 97530 THERAPEUTIC ACTIVITIES: CPT

## 2017-08-23 PROCEDURE — 25000003 PHARM REV CODE 250: Performed by: HOSPITALIST

## 2017-08-23 PROCEDURE — 97165 OT EVAL LOW COMPLEX 30 MIN: CPT

## 2017-08-23 PROCEDURE — G8987 SELF CARE CURRENT STATUS: HCPCS | Mod: CK

## 2017-08-23 PROCEDURE — 97116 GAIT TRAINING THERAPY: CPT

## 2017-08-23 RX ADMIN — ASPIRIN 81 MG: 81 TABLET, COATED ORAL at 09:08

## 2017-08-23 RX ADMIN — SODIUM CHLORIDE: 0.9 INJECTION, SOLUTION INTRAVENOUS at 05:08

## 2017-08-23 RX ADMIN — OXYCODONE HYDROCHLORIDE AND ACETAMINOPHEN 1 TABLET: 10; 325 TABLET ORAL at 09:08

## 2017-08-23 RX ADMIN — OXYBUTYNIN CHLORIDE 15 MG: 5 TABLET, FILM COATED, EXTENDED RELEASE ORAL at 09:08

## 2017-08-23 RX ADMIN — LEVOTHYROXINE SODIUM 112 MCG: 112 TABLET ORAL at 05:08

## 2017-08-23 RX ADMIN — PANTOPRAZOLE SODIUM 40 MG: 40 TABLET, DELAYED RELEASE ORAL at 09:08

## 2017-08-23 RX ADMIN — CEPHALEXIN 500 MG: 500 CAPSULE ORAL at 09:08

## 2017-08-23 RX ADMIN — DOCUSATE SODIUM 100 MG: 100 CAPSULE, LIQUID FILLED ORAL at 09:08

## 2017-08-23 RX ADMIN — FLUOXETINE 60 MG: 20 CAPSULE ORAL at 09:08

## 2017-08-23 NOTE — PLAN OF CARE
Pt is being discharged. Removed tele, no ectopy, Removed IV, tip intact, tolerated well. Went over discharge instructions with pt, verbalized understanding.

## 2017-08-23 NOTE — PROGRESS NOTES
.Pharmacy New Medication Education    Patient accepted medication education.    Pharmacy educated patient on name and purpose of medications and possible side effects, using the teach-back method.     keflex      Learners of pharmacy medication education included:  patient    Patient +/- learner response:  teachback

## 2017-08-23 NOTE — PT/OT/SLP PROGRESS
Physical Therapy  Treatment    Tasha Hawley   MRN: [id number]   Admitting Diagnosis: Altered mental state    PT Received On: 08/23/17  PT Start Time: 1252     PT Stop Time: 1316    PT Total Time (min): 24 min       Billable Minutes:  Gait [id number] and Therapeutic Activity 8    Treatment Type: Treatment  PT/PTA: PTA     PTA Visit Number: 1       General Precautions: Standard, fall  Orthopedic Precautions: N/A   Braces: N/A    Do you have any cultural, spiritual, Restoration conflicts, given your current situation?: none reported to PT    Subjective:  Communicated with nsg prior to session.      Pain/Comfort  Pain Rating 1:  (no rating)  Location - Orientation 1: lower  Location 1: back (chronic)  Pain Addressed 1: Reposition, Distraction    Objective:   Patient found with: [place] catheter    Functional Mobility:  Bed Mobility:   Supine to Sit: Supervision    Transfers:  Sit <> Stand Assistance: Stand By Assistance  Sit <> Stand Assistive Device: Rolling Walker    Gait:   Gait Distance: ~75' X 2 with flexed posture and v/c's  Assistance 1: Stand by Assistance, Contact Guard Assistance  Gait Assistive Device: Rolling walker  Gait Pattern: reciprocal  Gait Deviation(s): decreased cj, decreased velocity of limb motion, decreased step length, decreased stride length    Stairs:      Balance:   Static Sit: FAIR+: Able to take MINIMAL challenges from all directions  Dynamic Sit: FAIR+: Maintains balance through MINIMAL excursions of active trunk motion  Static Stand: POOR+: Needs MINIMAL assist to maintain  Dynamic stand: POOR: N/A     Therapeutic Activities and Exercises:       AM-PAC 6 CLICK MOBILITY  How much help from another person does this patient currently need?   1 = Unable, Total/Dependent Assistance  2 = A lot, Maximum/Moderate Assistance  3 = A little, Minimum/Contact Guard/Supervision  4 = None, Modified [place]/Independent    Turning over in bed (including adjusting bedclothes, sheets and blankets)?:  3  Sitting down on and standing up from a chair with arms (e.g., wheelchair, bedside commode, etc.): 3  Moving from lying on back to sitting on the side of the bed?: 3  Moving to and from a bed to a chair (including a wheelchair)?: 3  Need to walk in hospital room?: 3  Climbing 3-5 steps with a railing?: 2  Total Score: 17    AM-PAC Raw Score CMS G-Code Modifier Level of Impairment Assistance   6 % Total / Unable   7 - 9 CM 80 - 100% Maximal Assist   10 - 14 CL 60 - 80% Moderate Assist   15 - 19 CK 40 - 60% Moderate Assist   20 - 22 CJ 20 - 40% Minimal Assist   23 CI 1-20% SBA / CGA   24 CH 0% Independent/ Mod I     Patient left EOB with all lines intact, call button in reach and nsg present.    Assessment: increased time with gait,rest periods PRN  Tasha Hawley is a 61 y.o. female with a medical diagnosis of Altered mental state and presents with .    Rehab identified problem list/impairments: Rehab identified problem list/impairments: weakness, impaired endurance, impaired functional mobilty, gait instability, impaired balance, decreased upper extremity function, decreased lower extremity function, abnormal tone    Rehab potential is excellent.    Activity tolerance: Good    Discharge recommendations: Discharge Facility/Level Of Care Needs: home health PT, home health OT     Barriers to discharge: Barriers to Discharge: Decreased caregiver support    Equipment recommendations: Equipment Needed After Discharge: none     GOALS: see general POC   Physical Therapy Goals     Not on file          Multidisciplinary Problems (Resolved)        Problem: Physical Therapy Goal    Goal Priority Disciplines Outcome Goal Variances Interventions   Physical Therapy Goal   (Resolved)     PT/OT, PT Outcome(s) achieved     Description:  Goals to be met by: 17     Patient will increase functional independence with mobility by performin. Supine <> sit with Modified [place]  2. Sit to stand transfer with  Supervision  3. Bed to chair transfer with Stand-by Assistance using Rolling Walker  4. Gait  x 50 feet with Stand-by Assistance using Rolling Walker.   5. Stand for 5 minutes with Supervision using Rolling Walker  6. Lower extremity exercise program x 10 reps per handout, with supervision                      PLAN:    Patient to be seen 6 x/week  to address the above listed problems via gait training, therapeutic activities, therapeutic exercises  Plan of Care expires: 09/22/17  Plan of Care reviewed with: patient, spouse         Roni Aguilar, PTA  08/23/2017

## 2017-08-23 NOTE — PROGRESS NOTES
[facility] Hospital Medicine  Progress Note    Patient Name: Tasha Hawley  MRN: [id number]  Patient Class: IP- Inpatient   Admission Date: 8/20/2017  Length of Stay: 2 days  Attending Physician: Isaias Anderson MD  Primary Care Provider: Mesfin Hodges Ii, MD        Subjective:     Principal Problem:Altered mental state    HPI:  Tasha Hawley is a 61 y.o. white woman with hypothyroidism, coronary artery disease, thoracic aortic atherosclerosis, severe left atrial enlargement, gastroesophageal reflux disease status post Nissen fundoplication, right facial droop since birth, history of work-related back injury in the 1990s with scoliosis, lumbar degenerative disc disease, lumbar facet arthropathy, history of multiple fusions and spacers from L3-S1, status post spinal cord stimulator and intrathecal hydromorphone pump, neurogenic bladder status post suprapubic catheter placement, lower extremity lymphedema, and depression.  She has difficulty ambulating at baseline.  She lives in [place], [place].  She is .  Her primary care physician is Dr. Mesfin Hodges.  Her pain management specialist is Dr. Nigel Hillman.  Her urologist is Dr. Shireen Mayo.  Her gastroenterologist is Dr. Prince Vance.  Her psychiatrist is Dr. Erik Oconnor.     She was taken to [facility] ED on 8/20/17 with possible seizure.  Her  stated that she slept until 3 pm, much later than usual.  She told her daughter she wasn't feeling well and she felt off mentally.   She was left with her granddaughter.  She called her daughter to come back because she felt ill and when her daughter returned she found her mother on the ground next to the swing.  She did note some jerking movements of her head.  EMS reports that she appeared postictal on their arrival.  She did clear and then had jerking movements of her head and left arm.  She was postictal for a couple of seconds and then cleared.  EMS  stated she also appeared to pass out a couple of times and was bradycardic during these episodes.  She would then returned to baseline.  Her  reported that she had not been feeling well for several days and had diarrhea that had since resolved.  She had not been eating or drinking much.  Her hydromorphone dosage on her pump was increased by 3% 4 days prior. She was hospitalized at [facility] from 3/2/17-3/3/17 with a similar presentation also occuring after an increase in her hydromorphone dose.  She also takes lorazepam and hydrocodone-acetaminophen, and just received a new prescription for oxycodone-acetaminophen.  She was initially lethargic but fully oriented and communicative.  She had positive nitrite and moderate bacteria on her urinalysis.  Her last UTI was on 7/7/17 and was treated with 10 days of clindamycin and nitrofurantoin for E coli.  This time she was given IV ceftriaxone.  She was admitted to [facility].      Hospital Course:  She was orthostatic so was given IV saline.  Urine output was greater than fluid intake.  She reported right shoulder pain after a fall a couple of weeks prior, and x-ray showed no fracture.  She was concerned about bradycardia (pulse 40s-50s) but clinic notes indicate that her pulse is always like this.  Urologist Dr. Matt Machado changed her suprapubic catheter.  Urine culture grew >100,000 cfu/mL E coli with the same sensitivities as the E coli on 7/7/17, which was resistant to ciprofloxacin and tetracycline.  Her prior UTI may have been unsuccessfully treated because the AMEE for nitrofurantoin was </= 32.  Ceftriaxone was changed to cephalexin.  She continued to be orthostatic and was kept another night.  She still had severe back pain and headache on 8/23/17 but felt that she could go home.  She was prescribed cephalexin for 10 days and home health.    Interval History: Feels that she can make it home.    Review of Systems    Constitutional: Negative for chills and fever.   Respiratory: Negative for cough and shortness of breath.    Musculoskeletal: Positive for back pain.   Neurological: Positive for headaches.     Objective:     Vital Signs (Most Recent):  Temp: 97.1 °F (36.2 °C) (08/23/17 0717)  Pulse: (!) 41 (08/23/17 0717)  Resp: 18 (08/23/17 0717)  BP: (!) 152/70 (08/23/17 0717)  SpO2: 96 % (08/23/17 0409) Vital Signs (24h Range):  Temp:  [97.1 °F (36.2 °C)-98.8 °F (37.1 °C)] 97.1 °F (36.2 °C)  Pulse:  [41-53] 41  Resp:  [18] 18  SpO2:  [96 %-100 %] 96 %  BP: (102-152)/(53-74) 152/70     Weight: 59 kg (130 lb)  Body mass index is 20.36 kg/m².    Intake/Output Summary (Last 24 hours) at 08/23/17 1011  Last data filed at 08/23/17 0710   Gross per 24 hour   Intake              250 ml   Output             6475 ml   Net            -6225 ml      Physical Exam   Constitutional: She is oriented to person, place, and time. She appears well-developed. No distress.   Cardiovascular: Regular rhythm.  Bradycardia present.    Pulmonary/Chest: Effort normal. No respiratory distress.   Musculoskeletal: She exhibits edema.   Neurological: She is alert and oriented to person, place, and time.   Psychiatric: She has a normal mood and affect.   Nursing note and vitals reviewed.      Significant Labs: All pertinent labs within the past 24 hours have been reviewed.    Significant Imaging: I have reviewed all pertinent imaging results/findings within the past 24 hours.       Assessment/Plan:      Urinary tract infection associated with cystostomy catheter    Neurogenic bladder  Tolerated ceftriaxone without adverse effects.  Prescribe course of cephalexin.            Primary hypothyroidism    TSH 5.063. Continue home levothyroxine 112 mcg daily          Neurogenic bladder              Coronary artery disease involving native coronary artery of native heart without angina pectoris    Continue aspirin.          Chronic pain    Narcotic dependency,  continuous  S/p insertion of intrathecal pump  Osteoarthritis of spine with radiculopathy, lumbar region  Continue Fiorinal, oxycodone, hydromorphone pump.        Major depressive disorder, single episode    Continue home alprazolam 0.5 mg BID          Iron deficiency anemia    Hgb 12.9, Hct 38.5. Above baseline. Evidence of dehydration. Monitor            VTE Risk Mitigation         Ordered     Medium Risk of VTE  Once      08/21/17 0659     Place sequential compression device  Until discontinued      08/21/17 0659        Disposition: Home today with home health.    Time Spent:  I spent 35 minutes on this discharge, which includes examination, reviewing hospital course with patient/family, reviewing discharge medications and arranging follow-up care.      Isaias Anderson MD  Department of Hospital Medicine   [facility]

## 2017-08-23 NOTE — PT/OT/SLP EVAL
Occupational Therapy  Evaluation/Treatment/Dc Summary     Tasha Hawley   MRN: [id number]   Admitting Diagnosis: Altered mental state    OT Date of Treatment: 08/23/17   OT Start Time: 1109  OT Stop Time: 1150  OT Total Time (min): 41 min    Billable Minutes:  Evaluation 10  Self Care/Home Management 16  Therapeutic Activity 15    Diagnosis: Altered mental state   The primary encounter diagnosis was Seizure. Diagnoses of Syncope, unspecified syncope type, Urinary tract infection associated with catheterization of urinary tract, unspecified indwelling urinary catheter type, initial encounter, Altered mental state, Syncope, and Osteoarthritis of spine with radiculopathy, lumbar region were also pertinent to this visit.      Past Medical History:   Diagnosis Date    Anxiety     Arthritis     Bilateral lower extremity edema     severe chronic    Carotid artery occlusion     Cataract     Depression     Fever blister     Hypothyroid     Iron deficiency anemia     Lumbar radiculopathy     with chronic pain    Ocular migraine     Sleep apnea     cpap      Past Surgical History:   Procedure Laterality Date    BACK SURGERY      x 12    CATARACT EXTRACTION W/  INTRAOCULAR LENS IMPLANT Left     Dr Coleman     HYSTERECTOMY  1975    endometriosis    pain pump placement      SQ Dilaudid Pump managed by Dr. Hillman, Pain Management    SPINAL CORD STIMULATOR REMOVAL      before [place]    SPINE SURGERY  5-13-13    CERVICAL FUSION         General Precautions: Standard, fall  Orthopedic Precautions: N/A  Braces:            Patient History:  Living Environment  Lives With: spouse  Living Arrangements: house  Living Environment Comment: Pt reports living with her  in [facility], 1 step to enter, tub/shower combo. Pt reports owning/using TTB, BSC, rollator, RW. Pt reports she requires assist PRN with ADLs and functional mobility in home. Reports that her  is always home with her. Reports recent falls   Equipment  "Currently Used at Home: bedside commode, walker, rolling, bath bench, rollator    Prior level of function:   Bed Mobility/Transfers: needs device and assist  Grooming: independent  Bathing: needs device and assist  Upper Body Dressing: independent  Lower Body Dressing: needs assist  Toileting: needs device and assist  Home Management Skills: needs assist  Driving License: No     Dominant hand: right    Subjective:  Communicated with nsg prior to session.  Pt states, " I am ready to go home today! I am feeling better but I still feel weak"   Chief Complaint: pain  Patient/Family stated goals: return home     Pain/Comfort  Pain Rating 1: 9/10  Location - Side 1: Bilateral  Location 1: back  Pain Addressed 1: Reposition, Distraction, Nurse notified  Pain Rating Post-Intervention 1: 9/10    Objective:       Cognitive Exam:  Oriented to: Person, Place, Time and Situation  Follows Commands/attention: Follows multistep  commands  Communication: clear/fluent  Memory:  No Deficits noted  Safety awareness/insight to disability: intact; slightly impaired with mobility  Coping skills/emotional control: Appropriate to situation    Visual/perceptual:  Wears eye glasses     Physical Exam:  Postural examination/scapula alignment: Rounded shoulder, Head forward, Posterior pelvic tilt, Abnormal trunk flexion and Kyphosis  Skin integrity: Visible skin intact  Edema: Moderate BLEs     Sensation:   Impaired light touch to hands     Upper Extremity Range of Motion:  Right Upper Extremity: WFL  Left Upper Extremity: WFL    Upper Extremity Strength:  Right Upper Extremity: 4-/5  Left Upper Extremity: 4-/5   Strength: fair     Fine motor coordination:   Intact        Functional Mobility:  Bed Mobility:  Supine to Sit: Stand by Assistance  Sit to Supine: Stand by Assistance    Transfers: x 6 trials   Sit <> Stand Assistance: Contact Guard Assistance  Sit <> Stand Assistive Device: Rolling Walker    Functional Ambulation: CGA with RW; " "decreased cj; ~ 50 feet in room and hallway throughout session     Activities of Daily Living:    LE Dressing Level of Assistance: Minimum assistance  Grooming Position: Standing at sink ~ 15 min  Grooming Level of Assistance: Contact guard assistance      Balance:   Static Sit: NORMAL: No deviations seen in posture held statically  Dynamic Sit: GOOD-: Maintains balance through MODERATE excursions of active trunk movement,     Static Stand: FAIR+: Takes MINIMAL challenges from all directions  Dynamic stand: FAIR: Needs CONTACT GUARD during gait    Therapeutic Activities and Exercises:  Functional transitions from EOB x 6 trials from EOB with education and continued demonstration/verbal cues for hand placement; standing at sink for G&H axs while reaching for items on counter- requires unilateral support at all times; functional mobility in hallway for endurance training     -[facility] 6 CLICK ADL  How much help from another person does this patient currently need?  1 = Unable, Total/Dependent Assistance  2 = A lot, Maximum/Moderate Assistance  3 = A little, Minimum/Contact Guard/Supervision  4 = None, Modified [place]/Independent    Putting on and taking off regular lower body clothing? : 2  Bathing (including washing, rinsing, drying)?: 3  Toileting, which includes using toilet, bedpan, or urinal? : 3  Putting on and taking off regular upper body clothing?: 3  Taking care of personal grooming such as brushing teeth?: 4  Eating meals?: 4  Total Score: 19    AM-PAC Raw Score CMS "G-Code Modifier Level of Impairment Assistance   6 % Total / Unable   7 - 9 CM 80 - 100% Maximal Assist   10-14 CL 60 - 80% Moderate Assist   15 - 19 CK 40 - 60% Moderate Assist   20 - 22 CJ 20 - 40% Minimal Assist   23 CI 1-20% SBA / CGA   24 CH 0% Independent/ Mod I       Patient left seated EOB  with all lines intact, call button in reach, bed alarm on and nsg notified and door ajar to hallway     Assessment:  Tasha LEE" Chandan is a 61 y.o. female with a medical diagnosis of Altered mental state and presents with deconditioning. Pt to d/c this date. Performed functional mobility in room and hallway as well as ADLs. Educated on home safety     Rehab identified problem list/impairments: Rehab identified problem list/impairments: weakness, gait instability, decreased lower extremity function, impaired balance, impaired endurance, decreased upper extremity function, decreased safety awareness, pain, impaired self care skills, impaired functional mobilty, edema, decreased coordination    Rehab potential is good.    Activity tolerance: Fair +     Discharge recommendations: Discharge Facility/Level Of Care Needs: home health OT, home health PT     Barriers to discharge: Barriers to Discharge: Decreased caregiver support    Equipment recommendations: none     GOALS:    Occupational Therapy Goals     Not on file                PLAN:  Patient to be seen  (Pt d/c from hospital this date) to address the above listed problems via    Plan of Care expires: 08/23/17  Plan of Care reviewed with: patient    OT G-codes  Functional Assessment Tool Used: am pac  Score: 19  Functional Limitation: Self care  Self Care Current Status (): CK  Self Care Goal Status (): CK  Self Care Discharge Status (): SONI Mitchell OT  08/23/2017

## 2017-08-23 NOTE — PROGRESS NOTES
DC was delayed to today, set up with [facility].    Accepted by [facility] in [facility]  For HH.  Follow-up With   Details   Why   Contact Info   Mesfin Hodges II, MD   Go on 8/31/2017   @10am, PM appointments not available for Hospital Follow up appointments   [address]  [place] [address]  [phone]   Erik Oconnor MD   On 8/31/2017       [address] [facility] [address]  [phone]   Prince Vance MD   On 9/7/2017   @10:30am   [address]  [place] [address]  [phone]   [facility]           [address] [facility] [address]  [phone]         Nigel Jung RN  Transitional Navigator/Case Management  [phone]

## 2017-08-23 NOTE — SUBJECTIVE & OBJECTIVE
Interval History: Feels that she can make it home.    Review of Systems   Constitutional: Negative for chills and fever.   Respiratory: Negative for cough and shortness of breath.    Musculoskeletal: Positive for back pain.   Neurological: Positive for headaches.     Objective:     Vital Signs (Most Recent):  Temp: 97.1 °F (36.2 °C) (08/23/17 0717)  Pulse: (!) 41 (08/23/17 0717)  Resp: 18 (08/23/17 0717)  BP: (!) 152/70 (08/23/17 0717)  SpO2: 96 % (08/23/17 0409) Vital Signs (24h Range):  Temp:  [97.1 °F (36.2 °C)-98.8 °F (37.1 °C)] 97.1 °F (36.2 °C)  Pulse:  [41-53] 41  Resp:  [18] 18  SpO2:  [96 %-100 %] 96 %  BP: (102-152)/(53-74) 152/70     Weight: 59 kg (130 lb)  Body mass index is 20.36 kg/m².    Intake/Output Summary (Last 24 hours) at 08/23/17 1011  Last data filed at 08/23/17 0710   Gross per 24 hour   Intake              250 ml   Output             6475 ml   Net            -6225 ml      Physical Exam   Constitutional: She is oriented to person, place, and time. She appears well-developed. No distress.   Cardiovascular: Regular rhythm.  Bradycardia present.    Pulmonary/Chest: Effort normal. No respiratory distress.   Musculoskeletal: She exhibits edema.   Neurological: She is alert and oriented to person, place, and time.   Psychiatric: She has a normal mood and affect.   Nursing note and vitals reviewed.      Significant Labs: All pertinent labs within the past 24 hours have been reviewed.    Significant Imaging: I have reviewed all pertinent imaging results/findings within the past 24 hours.

## 2017-08-24 ENCOUNTER — PATIENT OUTREACH (OUTPATIENT)
Dept: ADMINISTRATIVE | Facility: CLINIC | Age: 62
End: 2017-08-24

## 2017-08-24 ENCOUNTER — OUTPATIENT CASE MANAGEMENT (OUTPATIENT)
Dept: ADMINISTRATIVE | Facility: OTHER | Age: 62
End: 2017-08-24

## 2017-08-24 LAB
BACTERIA UR CULT: NORMAL
BACTERIA UR CULT: NORMAL

## 2017-08-24 NOTE — PROGRESS NOTES
C3 nurse attempted to contact patient. The following occurred:   C3 nurse attempted to contact Tasha Hawley for a TCC post hospital discharge follow up call. The patient is unable to conduct the call @ this time. The patient requested a callback.    The patient has a scheduled HOSFU appointment with Mesfin Hodges Ii, MD on 8/31/2017  @10am.

## 2017-08-24 NOTE — PT/OT/SLP DISCHARGE
Physical Therapy Discharge Summary    Tasha Hawley  MRN: [id number]   Altered mental state   Patient Discharged from acute Physical Therapy on 2017.  Please refer to prior PT noted date on 2017 for functional status.     Assessment:   Patient appropriate for care in another setting.  GOALS:    Physical Therapy Goals     Not on file          Multidisciplinary Problems (Resolved)        Problem: Physical Therapy Goal    Goal Priority Disciplines Outcome Goal Variances Interventions   Physical Therapy Goal   (Resolved)     PT/OT, PT Outcome(s) achieved     Description:  Goals to be met by: 17     Patient will increase functional independence with mobility by performin. Supine <> sit with Modified [place]  2. Sit to stand transfer with Supervision  3. Bed to chair transfer with Stand-by Assistance using Rolling Walker  4. Gait  x 50 feet with Stand-by Assistance using Rolling Walker.   5. Stand for 5 minutes with Supervision using Rolling Walker  6. Lower extremity exercise program x 10 reps per handout, with supervision                    Reasons for Discontinuation of Therapy Services  Transfer to alternate level of care.      Plan:  Patient Discharged to: Home with Home Health Service.

## 2017-08-24 NOTE — PROGRESS NOTES
Thank you for the referral.   For your information:    The following patient has been assigned to Jackie Polk RN with Outpatient [facility] for high risk screening.    Reason: Seizure    Please contact [facility] at [phone] with any questions.    Thank you,  Raegan Can

## 2017-08-25 ENCOUNTER — OUTPATIENT CASE MANAGEMENT (OUTPATIENT)
Dept: ADMINISTRATIVE | Facility: OTHER | Age: 62
End: 2017-08-25

## 2017-08-25 NOTE — PROGRESS NOTES
Contacted pt's /caregiver and he reports pt does not need assistance of any kind at this time from [facility]. This nurse gave pt my contact information for future reference if needed. Will close case at this time. RANDY Polk RN-OPCM

## 2017-08-26 LAB
BACTERIA BLD CULT: NORMAL
BACTERIA BLD CULT: NORMAL

## 2017-08-30 ENCOUNTER — TELEPHONE (OUTPATIENT)
Dept: INTERNAL MEDICINE | Facility: CLINIC | Age: 62
End: 2017-08-30

## 2017-08-30 ENCOUNTER — TELEPHONE (OUTPATIENT)
Dept: UROLOGY | Facility: CLINIC | Age: 62
End: 2017-08-30

## 2017-08-30 NOTE — TELEPHONE ENCOUNTER
----- Message from Antonio Grace sent at 8/30/2017  1:46 PM CDT -----  Contact: daughter - antonio marrufo - [phone]  dinesh - was of d/c last Friday - has little output - wants to talk to nurse - has many questions re catheter and bags - please call daughter - antonio marrufo - [phone]

## 2017-08-30 NOTE — TELEPHONE ENCOUNTER
Lydia Lisa     Reviewed the discharge summary dated 8/23/2017 from the admission dated 8/20/2017.  Apparently, they thought that the change in mental status might have been brought on after the change in dosing of the pain pump (was increased by 3% of the dilaudid.)  She had another admission after increased dilaudid in March.  She was found to have E coli but she had a suprapubic tube in place and I explained to the patient's daughter that she will always have a positive urine culture because of the s/p tube.      Advised against chronic antibiotics as this will only select out a multiple drug resistant organism.  She could try Vitamin C 500 mg bid to acidify the urine.  OK to change the bags from the large drainage bag to the leg bag but should discard them after 1 month.  We also discussed that the patient is non compliant and has been late to several appointments.  The last time she was late, I had told her that I would not see her if she continued this behavior.  (she was 1 hour late)  Home health has been instructed not to check the urine out of routine only if the patient is symptomatic.  I.e. Urgency, urge incontinence or suprapubic tendernessm fevers,  She expressed understanding.

## 2017-08-30 NOTE — TELEPHONE ENCOUNTER
----- Message from Arely Trimble sent at 8/30/2017  1:43 PM CDT -----  Contact: Daughter/Lisa [phone]  Daughter has some questions and some things that needs to be addressed in the appt tomorrow. And also wants the home health agency changed. Please call before appt tomorrow.    Please call and advise.    Thank You

## 2017-08-31 ENCOUNTER — LAB VISIT (OUTPATIENT)
Dept: LAB | Facility: HOSPITAL | Age: 62
End: 2017-08-31
Attending: INTERNAL MEDICINE
Payer: MEDICARE

## 2017-08-31 ENCOUNTER — OFFICE VISIT (OUTPATIENT)
Dept: INTERNAL MEDICINE | Facility: CLINIC | Age: 62
End: 2017-08-31
Payer: MEDICARE

## 2017-08-31 VITALS
DIASTOLIC BLOOD PRESSURE: 68 MMHG | TEMPERATURE: 98 F | HEART RATE: 58 BPM | HEIGHT: 64 IN | SYSTOLIC BLOOD PRESSURE: 100 MMHG

## 2017-08-31 DIAGNOSIS — D64.9 ANEMIA, UNSPECIFIED TYPE: ICD-10-CM

## 2017-08-31 DIAGNOSIS — R53.81 DEBILITY: ICD-10-CM

## 2017-08-31 DIAGNOSIS — E03.9 PRIMARY HYPOTHYROIDISM: Chronic | ICD-10-CM

## 2017-08-31 DIAGNOSIS — G89.4 CHRONIC PAIN SYNDROME: Primary | Chronic | ICD-10-CM

## 2017-08-31 DIAGNOSIS — H60.501 ACUTE OTITIS EXTERNA OF RIGHT EAR, UNSPECIFIED TYPE: ICD-10-CM

## 2017-08-31 DIAGNOSIS — F32.4 MAJOR DEPRESSIVE DISORDER WITH SINGLE EPISODE, IN PARTIAL REMISSION: ICD-10-CM

## 2017-08-31 DIAGNOSIS — E87.6 HYPOKALEMIA: ICD-10-CM

## 2017-08-31 DIAGNOSIS — K59.03 DRUG-INDUCED CONSTIPATION: ICD-10-CM

## 2017-08-31 DIAGNOSIS — T83.510D URINARY TRACT INFECTION ASSOCIATED WITH CYSTOSTOMY CATHETER, SUBSEQUENT ENCOUNTER: ICD-10-CM

## 2017-08-31 DIAGNOSIS — N39.0 URINARY TRACT INFECTION ASSOCIATED WITH CYSTOSTOMY CATHETER, SUBSEQUENT ENCOUNTER: ICD-10-CM

## 2017-08-31 DIAGNOSIS — R47.9 SPEECH DISTURBANCE, UNSPECIFIED TYPE: ICD-10-CM

## 2017-08-31 LAB
ALBUMIN SERPL BCP-MCNC: 3.5 G/DL
ALP SERPL-CCNC: 88 U/L
ALT SERPL W/O P-5'-P-CCNC: 7 U/L
ANION GAP SERPL CALC-SCNC: 6 MMOL/L
AST SERPL-CCNC: 8 U/L
BASOPHILS # BLD AUTO: 0.01 K/UL
BASOPHILS NFR BLD: 0.3 %
BILIRUB SERPL-MCNC: 0.4 MG/DL
BUN SERPL-MCNC: 9 MG/DL
CALCIUM SERPL-MCNC: 9 MG/DL
CHLORIDE SERPL-SCNC: 106 MMOL/L
CHOLEST SERPL-MCNC: 111 MG/DL
CHOLEST/HDLC SERPL: 2.6 {RATIO}
CO2 SERPL-SCNC: 30 MMOL/L
CREAT SERPL-MCNC: 0.7 MG/DL
DIFFERENTIAL METHOD: ABNORMAL
EOSINOPHIL # BLD AUTO: 0.1 K/UL
EOSINOPHIL NFR BLD: 3.6 %
ERYTHROCYTE [DISTWIDTH] IN BLOOD BY AUTOMATED COUNT: 14.3 %
EST. GFR  (AFRICAN AMERICAN): >60 ML/MIN/1.73 M^2
EST. GFR  (NON AFRICAN AMERICAN): >60 ML/MIN/1.73 M^2
GLUCOSE SERPL-MCNC: 89 MG/DL
HCT VFR BLD AUTO: 35.5 %
HDLC SERPL-MCNC: 42 MG/DL
HDLC SERPL: 37.8 %
HGB BLD-MCNC: 11.4 G/DL
LDLC SERPL CALC-MCNC: 56.2 MG/DL
LYMPHOCYTES # BLD AUTO: 1.4 K/UL
LYMPHOCYTES NFR BLD: 35.3 %
MCH RBC QN AUTO: 27.3 PG
MCHC RBC AUTO-ENTMCNC: 32.1 G/DL
MCV RBC AUTO: 85 FL
MONOCYTES # BLD AUTO: 0.3 K/UL
MONOCYTES NFR BLD: 8 %
NEUTROPHILS # BLD AUTO: 2.1 K/UL
NEUTROPHILS NFR BLD: 52.8 %
NONHDLC SERPL-MCNC: 69 MG/DL
PLATELET # BLD AUTO: 175 K/UL
PMV BLD AUTO: 10.3 FL
POTASSIUM SERPL-SCNC: 3.7 MMOL/L
PROT SERPL-MCNC: 6.4 G/DL
RBC # BLD AUTO: 4.18 M/UL
SODIUM SERPL-SCNC: 142 MMOL/L
TRIGL SERPL-MCNC: 64 MG/DL
WBC # BLD AUTO: 3.88 K/UL

## 2017-08-31 PROCEDURE — 99499 UNLISTED E&M SERVICE: CPT | Mod: S$GLB,,, | Performed by: INTERNAL MEDICINE

## 2017-08-31 PROCEDURE — 99496 TRANSJ CARE MGMT HIGH F2F 7D: CPT | Mod: S$GLB,,, | Performed by: INTERNAL MEDICINE

## 2017-08-31 PROCEDURE — 36415 COLL VENOUS BLD VENIPUNCTURE: CPT

## 2017-08-31 PROCEDURE — 99999 PR PBB SHADOW E&M-EST. PATIENT-LVL III: CPT | Mod: PBBFAC,,, | Performed by: INTERNAL MEDICINE

## 2017-08-31 PROCEDURE — 80053 COMPREHEN METABOLIC PANEL: CPT

## 2017-08-31 PROCEDURE — 85025 COMPLETE CBC W/AUTO DIFF WBC: CPT

## 2017-08-31 PROCEDURE — 80061 LIPID PANEL: CPT

## 2017-08-31 RX ORDER — NEOMYCIN SULFATE, POLYMYXIN B SULFATE AND HYDROCORTISONE 10; 3.5; 1 MG/ML; MG/ML; [USP'U]/ML
3 SUSPENSION/ DROPS AURICULAR (OTIC) 3 TIMES DAILY
Qty: 10 ML | Refills: 0 | Status: SHIPPED | OUTPATIENT
Start: 2017-08-31 | End: 2017-08-31 | Stop reason: SDUPTHER

## 2017-08-31 RX ORDER — NEOMYCIN SULFATE, POLYMYXIN B SULFATE AND HYDROCORTISONE 10; 3.5; 1 MG/ML; MG/ML; [USP'U]/ML
3 SUSPENSION/ DROPS AURICULAR (OTIC) 3 TIMES DAILY
Qty: 10 ML | Refills: 0 | Status: SHIPPED | OUTPATIENT
Start: 2017-08-31 | End: 2017-09-04

## 2017-08-31 NOTE — PROGRESS NOTES
Subjective:       Patient ID: Tasha Hawley is a 62 y.o. female.    Chief Complaint: Follow-up (from ER) and Other (request OT/Speech)    HPI - Ms Hawley has been in the ER/hospital for confusion/kidney infection/narcotic overdose/seizure.  She feels better today.  Pain pump was increased by 3%, and she was changed to percocet from norco per her pain provider before all of this.  She has some pressure in her bladder now, and thinks she has a uti.  Notes from urology, Dr. Abbott, indicate not to send UA, as she'll always be colonized.  Lab review from admission showed hypokalemia and anemia.  Family requested OT and speech evaluation in the home.  She has ear pain bilaterally and wants me to look.   states that LE edema has regressed with her current med regimen.  She brought in meds to review.  Not a smoker.    PMH:  Insomnia, improving.  Chronic low back pain with narcotic use.  Indwelling narcotic pump  History CVA with dysarthria  Neurogenic bladder, with recurrent UTI's.  SP catheter placed Nov 2016  Hypothyroidism - last tsh improved  CECI, not on CPAP  CAD, with ACS in Jan 2016 - subtot mid LAD lesion without PCI; ischemic CM with EF 40% and chronic LE edema. Followed by [facility] cardiology  Anxiety and Depression  Chronic constipation, likely d/t ongoing narcotic use  Intermittent nausea     Meds: Reviewed and reconciled in EPIC with patient during visit today.     Review of Systems   Constitutional: Negative for fever.   HENT: Negative for congestion.    Respiratory: Negative for shortness of breath.    Cardiovascular: Positive for leg swelling (but improved).   Gastrointestinal: Positive for abdominal pain.   Genitourinary: Positive for pelvic pain.   Musculoskeletal: Positive for arthralgias and back pain.   Skin: Negative for rash.   Neurological: Negative for headaches.   Psychiatric/Behavioral: Positive for sleep disturbance.       Objective:      Physical Exam   Constitutional: She is  oriented to person, place, and time. She appears well-developed and well-nourished.   HENT:   Head: Normocephalic and atraumatic.   Left Ear: External ear normal.   Right external otitis noted.   Cardiovascular: Normal rate, regular rhythm and normal heart sounds.  Exam reveals no gallop and no friction rub.    No murmur heard.  Pulmonary/Chest: Effort normal and breath sounds normal. No respiratory distress. She has no wheezes. She has no rales. She exhibits no tenderness.   Neurological: She is alert and oriented to person, place, and time.   Skin: Skin is warm and dry. No erythema.   Psychiatric: She has a normal mood and affect.   Nursing note and vitals reviewed.      Assessment:       1. Chronic pain syndrome    2. Drug-induced constipation    3. Major depressive disorder with single episode, in partial remission    4. Primary hypothyroidism    5. Urinary tract infection associated with cystostomy catheter, subsequent encounter    6. Hypokalemia    7. Anemia, unspecified type    8. Acute otitis externa of right ear, unspecified type    9. Debility    10. Speech disturbance, unspecified type        Plan:     Tasha was seen today for follow-up and other.    Diagnoses and all orders for this visit:    Chronic pain syndrome - stable.  Advised her to be careful increasing dose.  She sees outside pain managemnt    Drug-induced constipation - stay on top of this, please    Major depressive disorder with single episode, in partial remission    Primary hypothyroidism - stable now that  is administering meds.  Lipid panel due  -     Lipid panel; Future    Urinary tract infection associated with cystostomy catheter, subsequent encounter    Hypokalemia - noted in chart review; repeat lab work  -     Comprehensive metabolic panel; Future    Anemia, unspecified type - noted in chart review; repeat lab work  -     CBC auto differential; Future    Acute otitis externa of right ear, unspecified type - new finding.  Will  use cortisporin otic  -     neomycin-polymyxin-hydrocortisone (CORTISPORIN) 3.5-10,000-1 mg/mL-unit/mL-% otic suspension; Place 3 drops into both ears 3 (three) times daily.    Debility - stable, unfortunately.  Will ask HH to assess for options  -     Ambulatory referral to Home Health    Speech disturbance, unspecified type - new complaint.  Will as speech to do an eval in the home  -     Ambulatory referral to Home Health    rtc prn, or 3 months    G Sriram Hodges MD MPH  Staff Internist

## 2017-08-31 NOTE — PROGRESS NOTES
Transitional Care Note  Subjective:       Patient ID: Tasha Hawley is a 62 y.o. female.  Chief Complaint: Follow-up (from ER) and Other (request OT/Speech)    Family and/or Caretaker present at visit?  Yes.  Diagnostic tests reviewed/disposition: I have reviewed all completed as well as pending diagnostic tests at the time of discharge.  Disease/illness education: discussed everything  Home health/community services discussion/referrals: Patient has home health established at [facility].  expanded to include more needs.   Establishment or re-establishment of referral orders for community resources: No other necessary community resources.   Discussion with other health care providers: No discussion with other health care providers necessary.   HPI  Review of Systems    Objective:      Physical Exam    Assessment:       1. Chronic pain syndrome    2. Drug-induced constipation    3. Major depressive disorder with single episode, in partial remission    4. Primary hypothyroidism    5. Urinary tract infection associated with cystostomy catheter, subsequent encounter    6. Hypokalemia    7. Anemia, unspecified type    8. Acute otitis externa of right ear, unspecified type    9. Debility    10. Speech disturbance, unspecified type        Plan:       Please see my note of today for details

## 2017-09-11 ENCOUNTER — TELEPHONE (OUTPATIENT)
Dept: INTERNAL MEDICINE | Facility: CLINIC | Age: 62
End: 2017-09-11

## 2017-09-11 NOTE — TELEPHONE ENCOUNTER
----- Message from Natasha Wilkerson sent at 9/11/2017 12:22 PM CDT -----  Contact: Nina/ [facility]/ [phone]   Ladonna is calling to let the doctor know the family is requesting speech be push back until the end of the week. She need to have verbal orders to continue until 09/17. Please call and advise       Thank you

## 2017-09-11 NOTE — TELEPHONE ENCOUNTER
Let me know if this is ok to give a verbal?  HH wants to push back her Speech therapy until 09/17.

## 2017-09-13 ENCOUNTER — TELEPHONE (OUTPATIENT)
Dept: UROLOGY | Facility: CLINIC | Age: 62
End: 2017-09-13

## 2017-09-13 NOTE — TELEPHONE ENCOUNTER
----- Message from Karoline Land MA sent at 9/13/2017 10:02 AM CDT -----  Contact: [phone]  I apologize but I honestly was not able to understand very much of what the pt was saying, it   has to do with her S/pa tube,  infection, wanting antibiotics.  [phone]

## 2017-09-15 RX ORDER — ALPRAZOLAM 0.5 MG/1
TABLET ORAL
Qty: 60 TABLET | Refills: 0 | Status: SHIPPED | OUTPATIENT
Start: 2017-09-15 | End: 2017-10-11

## 2017-09-18 NOTE — TELEPHONE ENCOUNTER
Francia w/ [facility] called, stated when changing meadows pt had hematuria. Asked for order for UA and c/s for urine specimen to be sent to lab, will fax results.

## 2017-09-22 ENCOUNTER — OFFICE VISIT (OUTPATIENT)
Dept: UROLOGY | Facility: CLINIC | Age: 62
End: 2017-09-22
Payer: MEDICARE

## 2017-09-22 VITALS
SYSTOLIC BLOOD PRESSURE: 97 MMHG | BODY MASS INDEX: 25.95 KG/M2 | HEART RATE: 61 BPM | DIASTOLIC BLOOD PRESSURE: 57 MMHG | HEIGHT: 64 IN | WEIGHT: 152 LBS

## 2017-09-22 DIAGNOSIS — T83.510D URINARY TRACT INFECTION ASSOCIATED WITH CYSTOSTOMY CATHETER, SUBSEQUENT ENCOUNTER: ICD-10-CM

## 2017-09-22 DIAGNOSIS — N39.0 URINARY TRACT INFECTION ASSOCIATED WITH CYSTOSTOMY CATHETER, SUBSEQUENT ENCOUNTER: ICD-10-CM

## 2017-09-22 DIAGNOSIS — N31.9 NEUROGENIC BLADDER: ICD-10-CM

## 2017-09-22 DIAGNOSIS — R33.9 URINARY RETENTION: Primary | ICD-10-CM

## 2017-09-22 PROCEDURE — 87186 SC STD MICRODIL/AGAR DIL: CPT

## 2017-09-22 PROCEDURE — 99999 PR PBB SHADOW E&M-EST. PATIENT-LVL IV: CPT | Mod: PBBFAC,,, | Performed by: UROLOGY

## 2017-09-22 PROCEDURE — 87077 CULTURE AEROBIC IDENTIFY: CPT

## 2017-09-22 PROCEDURE — 51705 CHANGE OF BLADDER TUBE: CPT | Mod: S$GLB,,, | Performed by: UROLOGY

## 2017-09-22 PROCEDURE — 3008F BODY MASS INDEX DOCD: CPT | Mod: S$GLB,,, | Performed by: UROLOGY

## 2017-09-22 PROCEDURE — 87088 URINE BACTERIA CULTURE: CPT

## 2017-09-22 PROCEDURE — 87086 URINE CULTURE/COLONY COUNT: CPT

## 2017-09-22 PROCEDURE — 99214 OFFICE O/P EST MOD 30 MIN: CPT | Mod: 25,S$GLB,, | Performed by: UROLOGY

## 2017-09-22 RX ORDER — ASCORBIC ACID 500 MG
1 TABLET,CHEWABLE ORAL 3 TIMES DAILY
Qty: 90 EACH | Refills: 11 | COMMUNITY
Start: 2017-09-22 | End: 2018-03-08

## 2017-09-22 RX ORDER — METHENAMINE MANDELATE 500 MG/1
0.5 TABLET ORAL 3 TIMES DAILY
Qty: 90 TABLET | Refills: 11 | Status: SHIPPED | OUTPATIENT
Start: 2017-09-22 | End: 2017-09-29 | Stop reason: CLARIF

## 2017-09-24 ENCOUNTER — PATIENT MESSAGE (OUTPATIENT)
Dept: INTERNAL MEDICINE | Facility: CLINIC | Age: 62
End: 2017-09-24

## 2017-09-25 LAB — BACTERIA UR CULT: NORMAL

## 2017-09-25 NOTE — PROGRESS NOTES
CHIEF COMPLAINT:    Mrs. Hawley is a 62 y.o. female presenting for recurrent UTI    PRESENTING ILLNESS:    Tasha Hawley is a 62 y.o. female who returns accompanied by her daughter, who is a medical device sales rep and her daughter in law who is a midlevel in peds.  They state they are trying to get the patient more organized because she has been in the hospital for recurrent UTI.  They are trying to organize her medications and assure that she has a regular schedule and takes her medications, accordingly.  She is not the most consistent with times that she eats or retires to bed.  She develops a thick urine at times that is cloudy and malodorous.  It is hard to say if she has increased back pain with this as she has had chronic back pain for 23 years and has had back numerous surgeries.  The patient states that the home health will not provide her with leg bags stating that she should not disconnect the bags, but she prefers not to go out with the large bag.  She has had several hospitalizations because of bladder infections.      REVIEW OF SYSTEMS:    Review of Systems   Constitutional: Negative.    HENT: Negative.    Eyes: Negative.    Cardiovascular: Positive for leg swelling.   Gastrointestinal: Positive for abdominal pain.   Genitourinary:        Has a suprapubic tube in place   Musculoskeletal: Positive for back pain, joint pain and neck pain.   Skin: Negative.    Neurological:        Bilateral lower extremity weakness   Endo/Heme/Allergies: Negative.    Psychiatric/Behavioral: The patient is nervous/anxious.      PATIENT HISTORY:    Past Medical History:   Diagnosis Date    Anxiety     Arthritis     Bilateral lower extremity edema     severe chronic    Carotid artery occlusion     Cataract     Depression     Fever blister     Hypothyroid     Iron deficiency anemia     Lumbar radiculopathy     with chronic pain    Ocular migraine     Sleep apnea     cpap       Past Surgical History:    Procedure Laterality Date    BACK SURGERY      x 12    CATARACT EXTRACTION W/  INTRAOCULAR LENS IMPLANT Left     Dr Coleman     HYSTERECTOMY  1975    endometriosis    pain pump placement      SQ Dilaudid Pump managed by Dr. Hillman, Pain Management    SPINAL CORD STIMULATOR REMOVAL      before [place]    SPINE SURGERY  5-13-13    CERVICAL FUSION       Family History   Problem Relation Age of Onset    Cancer Mother 55     breast    Cancer Father      esophagus,had laryngectomy    Esophageal cancer Father     Parkinsonism Maternal Grandmother     Tremor Maternal Grandmother     Heart disease Maternal Uncle        Social History    Marital status:      Social History Main Topics    Smoking status: Never Smoker    Smokeless tobacco: Never Used    Alcohol use No      Comment: denies    Drug use: No    Sexual activity: No       Allergies:  (d)-limonene flavor; Bactrim [sulfamethoxazole-trimethoprim]; Imitrex [sumatriptan succinate]; Penicillins; Topamax [topiramate]; Vancomycin; Butorphanol tartrate; Darvocet a500 [propoxyphene n-acetaminophen]; Fentanyl; White petrolatum-zinc; Zinc oxide-white petrolatum; and Latex, natural rubber    Medications:  Outpatient Encounter Prescriptions as of 9/22/2017   Medication Sig Dispense Refill    alprazolam (XANAX) 0.5 MG tablet TAKE ONE TABLET BY MOUTH TWICE A DAY AS NEEDED FOR ANXIETY 60 tablet 0    aspirin (ECOTRIN) 81 MG EC tablet Take 1 tablet (81 mg total) by mouth once daily.  0    atorvastatin (LIPITOR) 80 MG tablet Take 1 tablet (80 mg total) by mouth once daily. (Patient taking differently: Take 80 mg by mouth every evening. ) 90 tablet 3    BUTALBITAL/ASPIRIN/CAFFEINE (FIORINAL ORAL) Take by mouth as needed.      DOC-Q-LACE 100 mg capsule TAKE ONE CAPSULE BY MOUTH THREE TIMES A DAY AS NEEDED FOR CONSTIPATION 90 capsule 3    fluoxetine (PROZAC) 20 MG capsule Take 3 capsules (60 mg total) by mouth once daily. 60 capsule 2    levothyroxine  (SYNTHROID) 112 MCG tablet Take 1 tablet (112 mcg total) by mouth before breakfast. 90 tablet 3    ondansetron (ZOFRAN) 8 MG tablet Take 1 tablet (8 mg total) by mouth every 12 (twelve) hours as needed for Nausea. 60 tablet 3    oxybutynin (DITROPAN XL) 15 MG TR24 Take 1 tablet (15 mg total) by mouth once daily. 30 tablet 6    oxycodone-acetaminophen (PERCOCET)  mg per tablet       pantoprazole (PROTONIX) 40 MG tablet Take 1 tablet (40 mg total) by mouth once daily. 90 tablet 3    SENNOSIDES (SENNA LAX ORAL) Take by mouth as needed.      trazodone (DESYREL) 50 MG tablet Take 3 tablets (150 mg total) by mouth every evening. 90 tablet 3    [DISCONTINUED] DOC-Q-LACE 100 mg capsule TAKE ONE CAPSULE BY MOUTH THREE TIMES DAILY AS NEEDED FOR CONSTIPATION 90 capsule 2    ascorbic acid, vitamin C, (VITAMIN C) 500 mg Chew Take 1 tablet by mouth 3 (three) times daily. 90 each 11    methenamine mandelate (MANDELAMINE) 0.5 g tablet Take 1 tablet (0.5 g total) by mouth 3 (three) times daily. 90 tablet 11    [DISCONTINUED] lamotrigine (LAMICTAL) 25 MG tablet Take 1 tablet (25 mg total) by mouth once daily. 30 tablet 6     No facility-administered encounter medications on file as of 9/22/2017.          PHYSICAL EXAMINATION:    The patient generally appears in good health, is appropriately interactive, and is in no apparent distress.    Skin: No lesions.    Mental: Cooperative with normal affect.    Neuro: Grossly intact.    HEENT: Normal. No evidence of lymphadenopathy.    Chest:  normal inspiratory effort.    Abdomen:  Soft, non-tender. No masses or organomegaly. Bladder is not palpable. No evidence of flank discomfort. No evidence of inguinal hernia. The suprapubic tube site is clean, dry and intact.    Urine in the drainage tube is relatively clear, with a small amount of sediment.  The bag is clear (not purple)    Extremities: No clubbing, cyanosis.  Significant edema bilaterally.      LABS:    Lab Results    Component Value Date    BUN 9 08/31/2017    CREATININE 0.7 08/31/2017     IMPRESSION:    Encounter Diagnoses   Name Primary?    Urinary retention Yes    Urinary tract infection associated with cystostomy catheter, subsequent encounter     Neurogenic bladder        PLAN:    1.  Changed the tube after removing the old one, prepping the site and placing a 20 Fr tube, 30 ml in the balloon.  The urine from the new tube was sent for culture.  2.  Methenamine three times a day with Vitamin C as a preventative.  3.  She is on oxybutynin xl 15 mg as she did not think that the Botox worked.    4.  Discussed that she will feel pain differently from other people since she has had chronic pain for so long.  She has allodynia and hyperalgesia, discussed what this means and how we presently understand the process.    5.  Use the catheter fixation device (velcro, elastic band that secures it to the leg) is likely to be the most comfortable.  A stat lock will likely tear her skin.

## 2017-09-26 ENCOUNTER — PATIENT MESSAGE (OUTPATIENT)
Dept: UROLOGY | Facility: CLINIC | Age: 62
End: 2017-09-26

## 2017-09-26 ENCOUNTER — TELEPHONE (OUTPATIENT)
Dept: UROLOGY | Facility: CLINIC | Age: 62
End: 2017-09-26

## 2017-09-26 ENCOUNTER — PATIENT MESSAGE (OUTPATIENT)
Dept: INTERNAL MEDICINE | Facility: CLINIC | Age: 62
End: 2017-09-26

## 2017-09-26 ENCOUNTER — TELEPHONE (OUTPATIENT)
Dept: INTERNAL MEDICINE | Facility: CLINIC | Age: 62
End: 2017-09-26

## 2017-09-26 NOTE — TELEPHONE ENCOUNTER
----- Message from Suyapa Batista sent at 9/26/2017 12:09 PM CDT -----  Contact: pt's [facility] speech therapist [phone] or [phone]  Pt is calling for test results. Pt is barely urinating and has blood in her urine. Pt would like to know if medication is being sent to her rx. Ladonna states pt is having some difficulty speaking so she called for the pt. Please call pt    [id number] - [facility]   [address]   [phone] (Phone)  [phone] (Fax)

## 2017-09-27 ENCOUNTER — TELEPHONE (OUTPATIENT)
Dept: GASTROENTEROLOGY | Facility: CLINIC | Age: 62
End: 2017-09-27

## 2017-09-27 ENCOUNTER — TELEPHONE (OUTPATIENT)
Dept: UROLOGY | Facility: CLINIC | Age: 62
End: 2017-09-27

## 2017-09-27 DIAGNOSIS — A49.9 BACTERIAL UTI: Primary | ICD-10-CM

## 2017-09-27 DIAGNOSIS — N39.0 BACTERIAL UTI: Primary | ICD-10-CM

## 2017-09-27 RX ORDER — CEPHALEXIN 500 MG/1
500 CAPSULE ORAL EVERY 12 HOURS
Qty: 20 CAPSULE | Refills: 0 | Status: SHIPPED | OUTPATIENT
Start: 2017-09-27 | End: 2017-10-07

## 2017-09-27 NOTE — PROGRESS NOTES
Called requesting antibiotic.  Having LUTS due to symptoms  9/22/2017       Urine Culture, Routine PROTEUS MIRABILIS> 100,000 cfu/ml No other significant isolate    This is sensitive to everything but Cipro Resistant.    Dr. Mayo in long surgery today. She saw her on 9/22/2017 and did collect a urine for cx.  Will send antibiotic to pharmacy.  Normal renal fx;  Reviewed allergies and current meds.    Keflex 500mg bid sent;  She has taken in the past without difficulty

## 2017-09-27 NOTE — TELEPHONE ENCOUNTER
----- Message from Elida Batista NP sent at 9/27/2017 12:09 PM CDT -----  Contact: Naz with [facility]#[phone]  Keflex sent to [facility]  She has taken before without any issues.    ----- Message -----  From: Christi Wang LPN  Sent: 9/27/2017  11:22 AM  To: Elida Batista NP    Can you please check this pt's urine culture result? Dr. Mayo is in a 6+ hour surgery today and pt and family has been calling for abx since yesterday. Thanks  ----- Message -----  From: Elida Cummings  Sent: 9/27/2017  11:16 AM  To: [facility] Staff    She states that they did not receive Rx for antibiotics. Please call

## 2017-09-29 ENCOUNTER — TELEPHONE (OUTPATIENT)
Dept: UROLOGY | Facility: CLINIC | Age: 62
End: 2017-09-29

## 2017-09-29 DIAGNOSIS — N39.0 RECURRENT UTI: Primary | ICD-10-CM

## 2017-09-29 RX ORDER — METHENAMINE HIPPURATE 1000 MG/1
1 TABLET ORAL 2 TIMES DAILY
Qty: 60 TABLET | Refills: 11 | Status: SHIPPED | OUTPATIENT
Start: 2017-09-29 | End: 2018-05-17

## 2017-09-29 NOTE — TELEPHONE ENCOUNTER
Received a message that methanamine mandalate was not covered but methenamine hippurate is.  This was changed.

## 2017-10-11 ENCOUNTER — TELEPHONE (OUTPATIENT)
Dept: INTERNAL MEDICINE | Facility: CLINIC | Age: 62
End: 2017-10-11

## 2017-10-11 ENCOUNTER — OFFICE VISIT (OUTPATIENT)
Dept: PSYCHIATRY | Facility: CLINIC | Age: 62
End: 2017-10-11
Payer: MEDICARE

## 2017-10-11 VITALS
DIASTOLIC BLOOD PRESSURE: 53 MMHG | BODY MASS INDEX: 29.64 KG/M2 | SYSTOLIC BLOOD PRESSURE: 108 MMHG | WEIGHT: 173.63 LBS | HEART RATE: 61 BPM | HEIGHT: 64 IN

## 2017-10-11 DIAGNOSIS — F33.0 MAJOR DEPRESSIVE DISORDER, RECURRENT EPISODE, MILD DEGREE: Primary | ICD-10-CM

## 2017-10-11 DIAGNOSIS — G47.00 INSOMNIA, UNSPECIFIED TYPE: ICD-10-CM

## 2017-10-11 PROCEDURE — 99499 UNLISTED E&M SERVICE: CPT | Mod: S$GLB,,, | Performed by: PSYCHIATRY & NEUROLOGY

## 2017-10-11 PROCEDURE — 90833 PSYTX W PT W E/M 30 MIN: CPT | Mod: S$GLB,,, | Performed by: PSYCHIATRY & NEUROLOGY

## 2017-10-11 PROCEDURE — 99999 PR PBB SHADOW E&M-EST. PATIENT-LVL III: CPT | Mod: PBBFAC,,, | Performed by: PSYCHIATRY & NEUROLOGY

## 2017-10-11 PROCEDURE — 99214 OFFICE O/P EST MOD 30 MIN: CPT | Mod: S$GLB,,, | Performed by: PSYCHIATRY & NEUROLOGY

## 2017-10-11 RX ORDER — TRAZODONE HYDROCHLORIDE 50 MG/1
150 TABLET ORAL NIGHTLY
Qty: 90 TABLET | Refills: 3 | Status: SHIPPED | OUTPATIENT
Start: 2017-10-11 | End: 2018-02-01

## 2017-10-11 RX ORDER — SERTRALINE HYDROCHLORIDE 100 MG/1
100 TABLET, FILM COATED ORAL DAILY
Qty: 30 TABLET | Refills: 1 | Status: SHIPPED | OUTPATIENT
Start: 2017-10-11 | End: 2017-10-20 | Stop reason: SDUPTHER

## 2017-10-11 NOTE — TELEPHONE ENCOUNTER
Please call her back.  We checked thyroid levels in August, and everything was fine.  No need to repeat the lab work now.    D

## 2017-10-11 NOTE — TELEPHONE ENCOUNTER
----- Message from Arely Trimble sent at 10/11/2017 10:09 AM CDT -----  Contact: Step-Daughter/Jaycee [phone]  Returned Call    Who left the message: Celeste Menendez MA    When did the practice call: 10/11/2017 10:01am    Please advise.    Thank You

## 2017-10-11 NOTE — PROGRESS NOTES
Outpatient Psychiatry Follow-Up Visit (MD/NP)    10/11/2017    Clinical Status of Patient:  Outpatient (Ambulatory)    Chief Complaint:  Tasha Hawley is a 62 y.o. female who presents today for follow-up of depression and anxiety.  Met with patient, daughter and and step daughter, Jaycee, who is a nurse..      Interval History and Content of Current Session:  Interim Events/Subjective Report/Content of Current Session: The family came in because of concerns about the pt.  They are trying to get the pt to cooperate more in her care.  They would like to see better adherence with meds, a daily routine, and acceptance of alternative treatmentys besides medication.  I agree.  They were also concerned that Prozac may not still be working.  Will switch to Zoloft.  We strongly encouraged pt to pursue psychotherapy and explained the benefits.  I also reviewed Xanax.  I expressed the concerns with the interaction with her pain medication.  The Xanax will be titrated down and discontinued.    Psychotherapy:  · Target symptoms: depression, anxiety   · Why chosen therapy is appropriate versus another modality: relevant to diagnosis  · Outcome monitoring methods: self-report, observation  · Therapeutic intervention type: supportive psychotherapy  · Topics discussed/themes: relationships difficulties, stress related to medical comorbidities, building skills sets for symptom management, symptom recognition  · The patient's response to the intervention is accepting. The patient's progress toward treatment goals is fair.   · Duration of intervention: 20 minutes.    Review of Systems   · PSYCHIATRIC: Pertinant items are noted in the narrative.    Past Medical, Family and Social History: The patient's past medical, family and social history have been reviewed and updated as appropriate within the electronic medical record - see encounter notes.    Compliance: yes    Side effects: None    Risk Parameters:  Patient reports no  "suicidal ideation  Patient reports no homicidal ideation  Patient reports no self-injurious behavior  Patient reports no violent behavior    Exam (detailed: at least 9 elements; comprehensive: all 15 elements)   Constitutional  Vitals:  Most recent vital signs, dated less than 90 days prior to this appointment, were reviewed.   Vitals:    10/11/17 0844   BP: (!) 108/53   Pulse: 61   Weight: 78.7 kg (173 lb 9.6 oz)   Height: 5' 4" (1.626 m)        General:  unremarkable, age appropriate     Musculoskeletal  Muscle Strength/Tone:  no tremor   Gait & Station:  in wheelchair     Psychiatric  Speech:  no latency; no press   Mood & Affect:  anxious  congruent and appropriate   Thought Process:  normal and logical   Associations:  intact   Thought Content:  normal, no suicidality, no homicidality, delusions, or paranoia   Insight:  intact   Judgement: behavior is adequate to circumstances   Orientation:  grossly intact   Memory: intact for content of interview   Language: grossly intact   Attention Span & Concentration:  able to focus   Fund of Knowledge:  intact and appropriate to age and level of education     Assessment and Diagnosis   Status/Progress: Based on the examination today, the patient's problem(s) is/are adequately but not ideally controlled.  New problems have not been presented today.   Co-morbidities are complicating management of the primary condition.  There are no active rule-out diagnoses for this patient at this time.     General Impression: Pt continues to have difficulties coping with her multiple medical problems      ICD-10-CM ICD-9-CM   1. Major depressive disorder, recurrent episode, mild degree F33.0 296.31   2. Insomnia, unspecified type G47.00 780.52       Intervention/Counseling/Treatment Plan   · Medication Management: Continue current medications. The risks and benefits of medication were discussed with the patient. except stop Prozac and titrate down and then discontinue Xanax.  Start " Zoloft 100 mg daily      Return to Clinic: 1 month

## 2017-10-11 NOTE — TELEPHONE ENCOUNTER
----- Message from Eulogio Curry sent at 10/11/2017  9:21 AM CDT -----  Contact: Jaycee - step daughter at [phone]  Jaycee requesting orders for pt to receive blood work to check thyroid levels. Pt is currently on the [facility] right now.

## 2017-10-13 ENCOUNTER — PATIENT MESSAGE (OUTPATIENT)
Dept: INTERNAL MEDICINE | Facility: CLINIC | Age: 62
End: 2017-10-13

## 2017-10-13 ENCOUNTER — PATIENT MESSAGE (OUTPATIENT)
Dept: PSYCHIATRY | Facility: CLINIC | Age: 62
End: 2017-10-13

## 2017-10-13 ENCOUNTER — TELEPHONE (OUTPATIENT)
Dept: GASTROENTEROLOGY | Facility: CLINIC | Age: 62
End: 2017-10-13

## 2017-10-13 NOTE — TELEPHONE ENCOUNTER
----- Message from Cassius Gutierrez sent at 10/13/2017  3:47 PM CDT -----  Contact: pt daughter Jaycee Hawley   Pt daughter would like to be called back regarding rescheduling upcoming appt on 10/19/17.      Pt daughter Jaycee Hawley can be reached at [phone].

## 2017-10-14 ENCOUNTER — PATIENT MESSAGE (OUTPATIENT)
Dept: UROLOGY | Facility: CLINIC | Age: 62
End: 2017-10-14

## 2017-10-16 ENCOUNTER — HOSPITAL ENCOUNTER (OUTPATIENT)
Dept: RADIOLOGY | Facility: HOSPITAL | Age: 62
Discharge: HOME | End: 2017-10-16
Attending: PHYSICIAN ASSISTANT
Payer: MEDICARE

## 2017-10-16 ENCOUNTER — TELEPHONE (OUTPATIENT)
Dept: INTERNAL MEDICINE | Facility: CLINIC | Age: 62
End: 2017-10-16

## 2017-10-16 DIAGNOSIS — R13.19 ESOPHAGEAL DYSPHAGIA: ICD-10-CM

## 2017-10-16 DIAGNOSIS — M51.36 DEGENERATION OF LUMBAR INTERVERTEBRAL DISC: ICD-10-CM

## 2017-10-16 DIAGNOSIS — M51.9 INTERVERTEBRAL DISC DISORDER: ICD-10-CM

## 2017-10-16 DIAGNOSIS — M51.36 DEGENERATION OF LUMBAR INTERVERTEBRAL DISC: Primary | ICD-10-CM

## 2017-10-16 PROCEDURE — 72100 X-RAY EXAM L-S SPINE 2/3 VWS: CPT | Mod: TC

## 2017-10-16 PROCEDURE — 72100 X-RAY EXAM L-S SPINE 2/3 VWS: CPT | Mod: 26,,, | Performed by: RADIOLOGY

## 2017-10-16 PROCEDURE — 72070 X-RAY EXAM THORAC SPINE 2VWS: CPT | Mod: 26,,, | Performed by: RADIOLOGY

## 2017-10-16 PROCEDURE — 72070 X-RAY EXAM THORAC SPINE 2VWS: CPT | Mod: TC

## 2017-10-16 RX ORDER — ALPRAZOLAM 0.5 MG/1
TABLET ORAL
Qty: 10 TABLET | Refills: 0 | Status: SHIPPED | OUTPATIENT
Start: 2017-10-16 | End: 2017-10-20

## 2017-10-16 NOTE — TELEPHONE ENCOUNTER
Patient states she has been having difficulty sleeping at night and would like to know increase her Trazodon. Please advise

## 2017-10-16 NOTE — TELEPHONE ENCOUNTER
----- Message from Arely Trimble sent at 10/16/2017  3:29 PM CDT -----  Contact: Patient [phone]  Patient is having trouble sleeping at night and would like to increase her Rx trazodone.    Please call and advise.    Thank You

## 2017-10-17 ENCOUNTER — PATIENT MESSAGE (OUTPATIENT)
Dept: ADMINISTRATIVE | Facility: OTHER | Age: 62
End: 2017-10-17

## 2017-10-17 ENCOUNTER — OUTPATIENT CASE MANAGEMENT (OUTPATIENT)
Dept: ADMINISTRATIVE | Facility: OTHER | Age: 62
End: 2017-10-17

## 2017-10-17 ENCOUNTER — TELEPHONE (OUTPATIENT)
Dept: UROLOGY | Facility: CLINIC | Age: 62
End: 2017-10-17

## 2017-10-17 NOTE — TELEPHONE ENCOUNTER
She is complaining of anxiety and not sleeping well.  I suggested she take the sertraline in the morning instead of at night.  I reviewed with her the necessity of weaning and discontiuing Xanax.  I reminded her that Xanax with opiate pain medication could be dangerous, that Xanax could make it more likely that she could fall.

## 2017-10-17 NOTE — TELEPHONE ENCOUNTER
Spoke w/ Sammi, states she tried to get urine specimen from pt's bladder on yesterday but only got a drop as pt has been having more wet diapers than urine draining from catheter. Needs to know if Dr. Mayo would like to see pt in clinic or should she try again on Thursday when she goes out to change catheter?

## 2017-10-17 NOTE — TELEPHONE ENCOUNTER
----- Message from Lina Cobian sent at 10/17/2017  8:44 AM CDT -----  Contact: pt   Pt would like to speak with you regarding some symptoms she's having with  new meds  sertraline (ZOLOFT) 100 MG tablet.   Pt cb #   [phone]

## 2017-10-17 NOTE — TELEPHONE ENCOUNTER
----- Message from Kalina Cohn MA sent at 10/17/2017 11:06 AM CDT -----  Contact: Sammi mike/[facility]    States she needs to speak to you to see if she can be seen Wednesday, 10-18 due to a fall and the catheter pulled out some and she thinks she is with an uti.  States she has a nephew who is a nurse and he tried to change the catheter but he couldn't.

## 2017-10-17 NOTE — TELEPHONE ENCOUNTER
No, I'm not comfortable increasing the dose of trazodone above what she's taking now.  Please let her know.  We can discuss options during her next visit.    D

## 2017-10-17 NOTE — PATIENT INSTRUCTIONS
Fall Prevention  Falls often occur due to slipping, tripping or losing your balance. Millions of people fall every year and injure themselves. Here are ways to reduce your risk of falling again.  · Think about your fall, was there anything that caused your fall that can be fixed, removed, or replaced?  · Make your home safe by keeping walkways clear of objects you may trip over.  · Use non-slip pads under rugs. Do not use area rugs or small throw rugs.  · Use non-slip mats in bathtubs and showers.  · Install handrails and lights on staircases.  · Do not walk in poorly lit areas.  · Do not stand on chairs or wobbly ladders.  · Use caution when reaching overhead or looking upward. This position can cause a loss of balance.  · Be sure your shoes fit properly, have non-slip bottoms and are in good condition.   · Wear shoes both inside and out. Avoid going barefoot or wearing slippers.  · Be cautious when going up and down stairs, curbs, and when walking on uneven sidewalks.  · If your balance is poor, consider using a cane or walker.  · If your fall was related to alcohol use, stop or limit alcohol intake.   · If your fall was related to use of sleeping medicines, talk to your doctor about this. You may need to reduce your dosage at bedtime if you awaken during the night to go to the bathroom.    · To reduce the need for nighttime bathroom trips:  ¨ Avoid drinking fluids for several hours before going to bed  ¨ Empty your bladder before going to bed  ¨ Men can keep a urinal at the bedside  · Stay as active as you can. Balance, flexibility, strength, and endurance all come from exercise. They all play a role in preventing falls. Ask your healthcare provider which types of activity are right for you.  · Get your vision checked on a regular basis.  · If you have pets, know where they are before you stand up or walk so you don't trip over them.  · Use night lights.  Date Last Reviewed: 11/5/2015  © 1370-8204 [employer]. [address] [place] [address], [place] [address]. All rights reserved. This information is not intended as a substitute for professional medical care. Always follow your healthcare professional's instructions.        Preventing Falls in the Home  An adult or child can fall for many reasons. If you are an older adult, you may fall because your reaction time slows down. Your muscles and joints may get stiff, weak, or less flexible because of illness, medicines, or a physical condition. These things can also make a child more likely to fall or be injured in a fall.  Other health problems that make falls more likely include:  · Arthritis  · Dizziness or lightheadedness when you get out of bed (orthostatic hypotension)  · History of a stroke  · Dizziness  · Anemia  · Certain medicines taken for mental illness  · Problems with balance or gait  · History of falls with or without an injury  · Changes in vision (vision impairment)  · Changes in thinking skills and memory (cognitive impairment)  Injuries from a fall can include broken bones, dislocated joints, and cuts. When these injuries are serious enough, they can make it impossible for you or a child who is injured in a fall to live on his or her own.  Prevention tips  To help prevent falls and fall-related injuries, follow the tips below.   Floors  Make floors safer by doing the following:   · Put nonskid pads under area rugs.  · Remove throw rugs.  · Replace worn floor coverings.  · Tack carpets firmly to each step on carpeted stairs. Put nonskid strips on the edges of uncarpeted stairs.  · Keep floors and stairs free of clutter and cords.  · Arrange furniture so there are clear pathways.  · Clean up any spills right away.  · Wear shoes that fit.  Bathrooms    Make bathrooms safer by doing the following:   · Install grab bars in the tub or shower.  · Apply nonskid strips or put a nonskid rubber mat in the tub or shower.  · Sit on a bath chair to bathe.  · Use  bathmats with nonskid backing.  Lighting and the environment  Improve lighting in your home by doing the following:   · Keep a flashlight in each room. Or put a lamp next to the bed within easy reach.  · Put nightlights in the bedrooms, hallways, kitchen, and bathrooms.  · Make sure all stairways have good lighting.  · Take your time when going up and down stairs.  · Put handrails on both sides of stairs and in walkways for more support. To prevent injury to your wrist or arm, dont use handrails to pull yourself up.  · Install grab bars to pull yourself up.  · Move or rearrange items that you use often. This will make them easier to find or reach.  · Look at your home to find any safety hazards. Especially look at doorways, walkways, and the driveway. Remove or repair any safety problems that you find.  Date Last Reviewed: 8/1/2016  © 6212-4664 [employer]. [address] [place] [address]. All rights reserved. This information is not intended as a substitute for professional medical care. Always follow your healthcare professional's instructions.        Chronic Pain  Pain serves an important role. It lets you know something is wrong that needs your attention. When the body heals, pain normally goes away.  When pain lasts longer than six months, it is called chronic pain. This is pain that is present even after the body has healed. Chronic pain can cause mood problems and get in the way of your relationships and your daily life.  A number of conditions can cause chronic pain. Some of the more common include:  · Previous surgery  · An old injury  · Infection  · Diseases such as diabetes  · Nerve damage  · Back injury  · Arthritis  · Migraine or other headaches  · Fibromyalgia  · Cancer  Depression and stress can make chronic pain symptoms worse. In some cases, a cause for the pain cannot be found.   Treatment  Treatment can greatly reduce pain. In many cases, pain can become less severe,  occur less often, and interfere less with your daily life. Chronic pain is often treated with a combination of medicines, therapies, and lifestyle changes. You will work closely with your healthcare provider to find a treatment plan that works best for you.  · Ask your healthcare provider for a referral to a pain management specialty center. These can provide the most recent and proven pain management strategies, along with emotional support and comprehensive services.  · Several different types of medicines may be prescribed for chronic pain. Work with your healthcare provider to develop a medicine plan that helps manage your pain.  · Physical therapy can be very effective in helping reduce certain types of chronic pain.  · Occupational therapy teaches you how to do routine tasks of daily living in ways that lessen your discomfort.  · Psychological therapy can help you cope better with stress and pain.  · Other therapies such as meditation, yoga, biofeedback, massage, and acupuncture can also help manage chronic pain.  · Changing certain habits can help reduce chronic pain. They include:  ¨ Eating healthy  ¨ Developing an exercise routine  ¨ Getting enough sleep at night  ¨ Stopping smoking and limiting alcohol use  ¨ Losing excess weight  Follow-up care  Follow up with your healthcare provider as advised. Let your healthcare provider know if your current treatment plan is working or if changes are needed.  Resources  For more information, contact:  · [employer] [place] for Headache Society www.achenet.org  · American Chronic Pain Association www.theacpa.org 860-051-9073  Date Last Reviewed: 7/26/2015 © 2000-2017 [employer]. [address] [place] [address], [place] [address]. All rights reserved. This information is not intended as a substitute for professional medical care. Always follow your healthcare professional's instructions.        Pain Management: Chronic  You have a painful condition that has  "required frequent use of opioid pain medicine. Your healthcare provider wants you to receive the best possible care for your problem. To achieve this, you must have a personal doctor who can supervise a treatment plan for you.  You may contact one of the doctors whose name has been given to you. Or you may locate a personal doctor on your own.  If your doctor determines that you need pain medicine on an emergency basis, he or she should provide you with a pain contract. This is a letter from your doctor that describes what pain medicine you may receive, how much, and how often. You sign it, agreeing to the terms of the treatment plan. Bring this with you each time you come to this facility. It will allow you to get the proper treatment with minimal delay.  Note  In the future, you will not be able to receive opioid pain medicine from this facility without a pain contract or telephone approval from your personal healthcare provider.  Your doctor must update your pain contract often. If it is not current, this facility may not accept it. A pain contract does not guarantee that you will get the medicines you request. The doctor treating you has the right to withhold medicines if he or she thinks that it is advisable.   Date Last Reviewed: 3/1/2017  © 5395-9702 [employer]. [address] [place] [address], [place] [address]. All rights reserved. This information is not intended as a substitute for professional medical care. Always follow your healthcare professional's instructions.        Know the Signs and Symptoms of Depression  Everyone feels down at times. The blues are a natural part of life. But an unhappy period thats intense or lasts for more than a couple of weeks can be a sign of depression.  Depression is a serious illness. It is not a sign of weakness or a "character flaw," and it is not something you can "snap out of." In fact, most people with depression need treatment to get better. Depression can " disrupt the lives of family and friends. If you know someone you think may be depressed, find out what you can do to help.    Recognizing signs of depression  People who are depressed may:  · Feel unhappy, sad, blue, down, or miserable nearly every day  · Feel helpless, hopeless, or worthless  · Lose interest in hobbies, friends, and activities that used to give pleasure  · Not sleep well or sleep too much  · Gain or lose weight  · Feel low on energy or constantly tired  · Have a hard time concentrating or making decisions  · Lose interest in sex  · Have physical symptoms, such as stomachaches, headaches, or backaches  Know the serious signals  Never ignore a person's comments about suicide or behaviors that can lead to self-harm. Warning signals for suicide include:  · Threats or talk of suicide  · Statements such as I wont be a problem much longer or Nothing matters  · Giving away possessions or making a will or  arrangements  · Buying a gun or other weapon  · Sudden, unexplained cheerfulness or calm after a period of depression  If you notice any of these signs, get help right away. Call a healthcare professional, mental health clinic, or suicide hotline and ask what action to take. In an emergency, dont hesitate to call the police.  Resources:  · National Institutes of Mental Ebjftb082-425-1416qjb.nimh.nih.gov  · National [place] on Mental Yvjmssx585-799-6961fgy.josé.org   · Mental Health Zkczuxw785-191-1627suq.nmha.org  · National Suicide Xyjvgzy776-312-2344 (800-SUICIDE)  · National Suicide Prevention Cicfejru401-380-6651 (970-779-OAYV)www.suicidepreventionlifeline.org   Date Last Reviewed: 2017  © 6459-7069 [employer]. [address] [place] [address], [place] [address]. All rights reserved. This information is not intended as a substitute for professional medical care. Always follow your healthcare professional's instructions.        Depression Affects Your Mind and Body  Everyone  feels sad or blue from time to time for a few days or weeks. Depression is when these feelings don't go away and they interfere with daily life.  Depression is a real illness that can develop at any age. It is one of the most common mental health problems in the [place] Depression makes you feel sad, helpless, and hopeless. It gets in the way of your life and relationships. It inhibits your ability to think and act. But, with help, you can feel better again.     When I was depressed, I felt awful. I was so tired all the time I could hardly think, but at night I couldnt fall asleep. My head hurt. My stomach hurt. I didnt know what was wrong with me.   Depression affects your whole body  Brain chemicals affect your body as well as your mood. So depression may do more than just make you feel low. You may also feel bad physically. Depression can:  · Cause trouble with mental tasks such as remembering, concentrating, or making decisions  · Make you feel nervous and jumpy  · Cause trouble sleeping. Or you may sleep too much  · Change your appetite  · Cause headaches, stomachaches, or other aches and pains  · Drain your body of energy  Depression and other illness  It is common for people who have chronic health problems to also have depression. It can often be hard to tell which one caused the other. A person might become depressed after finding out they have a health problem. But some studies suggest being depressed may make certain health problems more likely. And some depressed people stop taking care of themselves. This may make them more likely to get sick.  Date Last Reviewed: 1/1/2017  © 2298-4098 [employer]. [address] [place] [address], [place] [address]. All rights reserved. This information is not intended as a substitute for professional medical care. Always follow your healthcare professional's instructions.        What You Can Do When a Loved One Is Depressed  If you know someone you think is  depressed, there are ways you can help. Keep in mind that even though depression is a frustrating illness, with help, a depressed person can feel better.    Encourage treatment  Depression is a very treatable illness. Medicine, counseling, and self-help measures can help a depressed person feel better and get back to a normal life. The first step is to visit a doctor or other mental health professional for a thorough evaluation. If you can, be present at the evaluation to offer support and help answer the doctors questions. If the depressed person is reluctant to seek help, call the doctor yourself and ask for advice.  If the depressed person expresses suicidal thoughts, or if you are concerned that he or she is in immediate danger of hurting him- or herself, seek immediate help. Take the person to the closest emergency room or to a 24-hour crisis clinic. People in a suicidal crisis need immediate psychiatric help and continuous observation. Do not leave the person alone while you seek help.  Be supportive  Here are some other ways you can help:  · Be a sympathetic ear. Talking about their feelings often helps depressed people feel better.  · Realize that the person is in pain. Try to be understanding.  · Dont criticize or be negative. Never blame anyone. Depression is no ones fault.  · Encourage the person to join in enjoyable activities. Invite the person out for movies or walks. But realize that he or she may not be able to cope with too many activities at once.  · Do not ignore or discount comments about self-harm or suicide. Ask what the person is thinking about doing and if they have the means (for example a gun or medicines) to hurt themselves. Seek immediate help if he or she is at risk.  · Be patient. Appreciate progress, however slow.  Take time out  Dont feel guilty about spending time away. Here are some things to keep in mind:  · Dont let the depressed person monopolize your time. Spend time alone  or with other family members and friends.  · Keep a positive attitude. Dont be influenced by the persons negative thinking.  · If your advice or help isnt accepted, understand that depression clouds judgment.  · Consider joining a support or therapy group. Counseling can often help family and friends better cope with a loved ones depression. Children, in particular, may benefit from counseling.  · Dont feel responsible for solving the problem yourself. You cant. Do what you can, and feel good about your efforts.  Date Last Reviewed: 1/1/2017 © 2000-2017 [employer]. [address] [place] [address]. All rights reserved. This information is not intended as a substitute for professional medical care. Always follow your healthcare professional's instructions.        Urinary Tract Infections in Women    Urinary tract infections (UTIs) are most often caused by bacteria (germs). These bacteria enter the urinary tract. The bacteria may come from outside the body. Or they may travel from the skin outside the rectum or vagina into the urethra. Female anatomy makes it easy for bacteria from the bowel to enter a womans urinary tract, which is the most common source of UTI. This means women develop UTIs more often than men. Pain in or around the urinary tract is a common UTI symptom. But the only way to know for sure if you have a UTI for the healthcare provider to test your urine. The two tests that may be done are the urinalysis and urine culture.  Types of UTIs  · Cystitis: A bladder infection (cystitis) is the most common UTI in women. You may have urgent or frequent urination. You may also have pain, burning when you urinate, and bloody urine.  · Urethritis: This is an inflamed urethra, which is the tube that carries urine from the bladder to outside the body. You may have lower stomach or back pain. You may also have urgent or frequent urination.  · Pyelonephritis: This is a kidney  infection. If not treated, it can be serious and damage your kidneys. In severe cases, you may be hospitalized. You may have a fever and lower back pain.  Medicines to treat a UTI  Most UTIs are treated with antibiotics. These kill the bacteria. The length of time you need to take them depends on the type of infection. It may be as short as 3 days. If you have repeated UTIs, a low-dose antibiotic may be needed for several months. Take antibiotics exactly as directed. Dont stop taking them until all of the medicine is gone. If you stop taking the antibiotic too soon, the infection may not go away, and you may develop a resistance to the antibiotic. This can make it much harder to treat.  Lifestyle changes to treat and prevent UTIs  The lifestyle changes below will help get rid of your UTI. They may also help prevent future UTIs.  · Drink plenty of fluids. This includes water, juice, or other caffeine-free drinks. Fluids help flush bacteria out of your body.  · Empty your bladder. Always empty your bladder when you feel the urge to urinate. And always urinate before going to sleep. Urine that stays in your bladder can lead to infection. Try to urinate before and after sex as well.  · Practice good personal hygiene. Wipe yourself from front to back after using the toilet. This helps keep bacteria from getting into the urethra.  · Use condoms during sex. These help prevent UTIs caused by sexually transmitted bacteria. Also, avoid using spermicides during sex. These can increase the risk of UTIs. Choose other forms of birth control instead. For women who tend to get UTIs after sex, a low-dose of a preventive antibiotic may be used. Be sure to discuss this option with your healthcare provider.  · Follow up with your healthcare provider as directed. He or she may test to make sure the infection has cleared. If needed, more treatment may be started.  Date Last Reviewed: 1/1/2017  © 5763-4717 [employer] [address]  [place] [address]. All rights reserved. This information is not intended as a substitute for professional medical care. Always follow your healthcare professional's instructions.

## 2017-10-17 NOTE — PROGRESS NOTES
"10-17-17--Patient's daughter-Lisa Thompson ([phone])left me a voice mail message requesting a return call. Initial assessment and RN assessment completed.  reports that patient resides with her spouse .  reports that patient is in need of assistance.  reports that her and her sister Jaycee Hawley are " at their wits end" and they do not know what to do to try to get adequate care for their mother.  reports that she found my phone number on some papers at the patients house.  reports that patient is alert and oriented but very child-like and forgetful.  reports that patient will fix a sandwich and forget to eat it until a few hours later.  reports that patient has lost over 100 pounds in the past 2 years.  reports that patient also requires considerable assistance with ADLs and IADLs.  reports that patient is unable to complete some of her ADLs without assistance.  reports that it takes the patient about 2 hours to get dressed on her own.  reports that patient uses a walker or wheelchair to aid with ambulation.  reports that patient does have H/O multiple falls.  reports that patient is currently active with [facility] for nursing and PT. We went over information about fall prevention and safety in home setting. We went over importance of adherence to medication regimen. We went over importance of maintained follow-up with doctors. I will mail educational literature about fall prevention and safety in home setting to patient/family, will review literature with patient/family at next phone call.  reports that patient suffers from chronic back, hip leg and knee pain.  reports that patient has an intrathecal pump inserted with Dilaudid.  reports that patient also takes vicodin for pain.  reports that patient's pain is not corrected with pain " medicine. We went over information about the bodies response to pain.We went over importance of adherence to medication regimen. We went over importance of maintained follow-up with doctors. I will mail educational literature about the bodies response to pain to patient/family, will review literature with patient/family at next phone call. Conducted depression screening tool with daughter using PHQ-9 tool, patient scored a 23. Which Warrants: Physician should be notified and use their clinical judgment about treatment, based on patient's duration of symptoms and functional impairment.Also indicates referral to [facility].  reports that patient does not wish to harm herself.  reports that patient is seen on a regular basis by  a psychiatrist and has an appointment to see a psychologist in November. I sent an urgent message to  to alert of elevated PHQ score. Will refer to [facility]. We went over information about depression. We went over signs and symptoms of worsening of depression. We went over importance of adherence to medication regimen. We went over importance of maintained follow-up with doctors. I will mail educational literature about depression and the 24 hour crisis line to patient/family, will review literature with patient/family at next phone call.  reports that she does not have the patient's medication list with her. Will complete medication reconciliation at next call.  reports that either her or her sister prepares pill box weekly for patient.  reports that patient cannot afford her medications. I will send a message in [employer] to [facility] to refer patient for assistance, provided them with 's phone number.  reports that patient has a suprapubic catheter due to her being unable to completely empty her bladder.  reports that patient suffers from frequent UTIs.  reports that patient has her  suprapubic cathter changed every month.  reports that patient's urine is clear and without an odor at present.  We went over information about UTI. We went over signs and symptoms of UTI. We went over importance of adherence to medication regimen. We went over importance of maintained follow-up with doctors. I will mail educational literature about UTI to patient/family, will review literature with patient/family at next phone call.  reports that patient cannot afford her financial responsibilities due to lack of income.  requests help with getting patient connected with Medicaid. Will refer to [facility] for assistance with available resources, gave 's phone number.     Follow-up Plan:  -Continue to educate about Depression. Review signs and symptoms of worsening of depression. Encourage patient to follow medication and treatment regimen. Encourage patient to maintain follow up with doctors.  -Follow-up with 's recommendations for elevated PHQ score.  -Continue to educate about UTI. Review signs and symptoms of UTI. Encourage patient to follow medication and treatment regimen. Encourage patient to maintain follow up with doctors.  -Continue to educate about fall prevention and safety in home setting.  Encourage patient to follow medication and treatment regimen. Encourage patient to maintain follow up with doctors.  -Is patient still active with [facility].  -Continue to educate about the bodies response to pain.  Encourage patient to follow medication and treatment regimen. Encourage patient to maintain follow up with doctors.  -Collaborate with [facility] about available resources.  -Follow-up with PAP recommendations.  -Complete medication reconciliation.    Malcom MENDOZA CCM

## 2017-10-18 ENCOUNTER — OFFICE VISIT (OUTPATIENT)
Dept: UROLOGY | Facility: CLINIC | Age: 62
End: 2017-10-18
Payer: MEDICARE

## 2017-10-18 ENCOUNTER — PATIENT MESSAGE (OUTPATIENT)
Dept: UROLOGY | Facility: CLINIC | Age: 62
End: 2017-10-18

## 2017-10-18 VITALS
DIASTOLIC BLOOD PRESSURE: 66 MMHG | SYSTOLIC BLOOD PRESSURE: 112 MMHG | HEART RATE: 49 BPM | BODY MASS INDEX: 28.76 KG/M2 | WEIGHT: 167.56 LBS

## 2017-10-18 DIAGNOSIS — N31.9 NEUROGENIC BLADDER: ICD-10-CM

## 2017-10-18 DIAGNOSIS — Z43.5 ENCOUNTER FOR SUPRAPUBIC CATHETER CARE: Primary | ICD-10-CM

## 2017-10-18 DIAGNOSIS — R33.9 URINARY RETENTION: ICD-10-CM

## 2017-10-18 DIAGNOSIS — R60.9 PITTING EDEMA: ICD-10-CM

## 2017-10-18 PROCEDURE — 99213 OFFICE O/P EST LOW 20 MIN: CPT | Mod: 25,S$GLB,, | Performed by: PHYSICIAN ASSISTANT

## 2017-10-18 PROCEDURE — 99499 UNLISTED E&M SERVICE: CPT | Mod: S$GLB,,, | Performed by: PHYSICIAN ASSISTANT

## 2017-10-18 PROCEDURE — 99999 PR PBB SHADOW E&M-EST. PATIENT-LVL III: CPT | Mod: PBBFAC,,, | Performed by: PHYSICIAN ASSISTANT

## 2017-10-18 PROCEDURE — 51705 CHANGE OF BLADDER TUBE: CPT | Mod: S$GLB,,, | Performed by: PHYSICIAN ASSISTANT

## 2017-10-18 RX ORDER — ALPRAZOLAM 0.5 MG/1
TABLET ORAL
Qty: 10 TABLET | Refills: 0 | Status: CANCELLED | OUTPATIENT
Start: 2017-10-18

## 2017-10-18 NOTE — TELEPHONE ENCOUNTER
Spoke w/ Sammi, she states family says pt's is having very little urine output from catheter and she needs to be seen. Pt scheduled today w/ ALEXY Peña for catheter check.

## 2017-10-19 ENCOUNTER — NURSE TRIAGE (OUTPATIENT)
Dept: ADMINISTRATIVE | Facility: CLINIC | Age: 62
End: 2017-10-19

## 2017-10-19 NOTE — TELEPHONE ENCOUNTER
Just an FYI  Spoke to patients  he states her urine output is much better today and she is starting to feel better. He also states her swelling is going down in her hands, face and legs. He doesn't want her to go to ED and will call me tomorrow if things are worse for an appointment.

## 2017-10-19 NOTE — PROGRESS NOTES
"CHIEF COMPLAINT:    Mrs. Hawley is a 62 y.o. female presenting for suprapubic catheter not draining.    PRESENTING ILLNESS:    Tasha Hawley is a 62 y.o. female with a PMH of recurrent UTI, urinary retention and neogenic bladder who presents for suprapubic catheter not draining correctly x 4-5 days.  She reports falling 3 weeks ago.  Her catheter was draining fine after the fall until 4-5 days ago.  She reports it draining well at night.  She had 1600cc this morning in the bag.  She last emptied her bag at 10:30a.m and hasn't had much urine output in her bag since that time.   She reports urinary incontinence.  Urine is "pouring out".  Her urine has a lot of sediment in it.  She also reports bladder pressure.  Her family member tried to change the catheter but it wouldn't come out. The home health nurse recommended she see urology.  Her last catheter change was by Dr. Mayo on 9/22/17.  She started taking methenamine with Vitamin C last month.    She also reports leg swelling.      REVIEW OF SYSTEMS:    Constitutional: Negative for fever and chills.   HENT: Negative for hearing loss.   Eyes: Negative for visual disturbance.   Respiratory: Negative for shortness of breath.   Cardiovascular: Negative for chest pain.   Gastrointestinal: Negative for vomiting, and constipation.   Genitourinary: Positive for urinary incontinence and incomplete bladder emptying  Negative for urgency, frequency, nocturia, dysuria, hematuria, intermittency, hesitancy.   Neurological: Negative for dizziness.   Hematological: Does not bruise/bleed easily.   Psychiatric/Behavioral: Negative for confusion.     PATIENT HISTORY:    Past Medical History:   Diagnosis Date    Anxiety     Arthritis     Bilateral lower extremity edema     severe chronic    Carotid artery occlusion     Cataract     Depression     Fever blister     Hypothyroid     Iron deficiency anemia     Lumbar radiculopathy     with chronic pain    Ocular " migraine     Sleep apnea     cpap       Past Surgical History:   Procedure Laterality Date    BACK SURGERY      x 12    CATARACT EXTRACTION W/  INTRAOCULAR LENS IMPLANT Left     Dr Coleman     HYSTERECTOMY  1975    endometriosis    pain pump placement      SQ Dilaudid Pump managed by Dr. Hillman, Pain Management    SPINAL CORD STIMULATOR REMOVAL      before [place]    SPINE SURGERY  5-13-13    CERVICAL FUSION       Family History   Problem Relation Age of Onset    Cancer Mother 55     breast    Cancer Father      esophagus,had laryngectomy    Esophageal cancer Father     Parkinsonism Maternal Grandmother     Tremor Maternal Grandmother     No Known Problems Brother     No Known Problems Brother     Heart disease Maternal Uncle     No Known Problems Sister     No Known Problems Maternal Aunt     No Known Problems Paternal Aunt     No Known Problems Paternal Uncle     No Known Problems Maternal Grandfather     No Known Problems Paternal Grandmother     No Known Problems Paternal Grandfather     Melanoma Neg Hx     Amblyopia Neg Hx     Blindness Neg Hx     Cataracts Neg Hx     Diabetes Neg Hx     Glaucoma Neg Hx     Hypertension Neg Hx     Macular degeneration Neg Hx     Retinal detachment Neg Hx     Strabismus Neg Hx     Stroke Neg Hx     Thyroid disease Neg Hx     Colon cancer Neg Hx        Social History     Social History    Marital status:      Spouse name: N/A    Number of children: N/A    Years of education: N/A     Occupational History    Not on file.     Social History Main Topics    Smoking status: Never Smoker    Smokeless tobacco: Never Used    Alcohol use No      Comment: denies    Drug use: No    Sexual activity: No     Other Topics Concern    Not on file     Social History Narrative    No narrative on file       Allergies:  (d)-limonene flavor; Bactrim [sulfamethoxazole-trimethoprim]; Imitrex [sumatriptan succinate]; Penicillins; Topamax [topiramate];  Vancomycin; Butorphanol tartrate; Darvocet a500 [propoxyphene n-acetaminophen]; Fentanyl; White petrolatum-zinc; Zinc oxide-white petrolatum; and Latex, natural rubber    Medications:    Current Outpatient Prescriptions:     alprazolam (XANAX) 0.5 MG tablet, One daily, then discontinue, Disp: 10 tablet, Rfl: 0    ascorbic acid, vitamin C, (VITAMIN C) 500 mg Chew, Take 1 tablet by mouth 3 (three) times daily., Disp: 90 each, Rfl: 11    aspirin (ECOTRIN) 81 MG EC tablet, Take 1 tablet (81 mg total) by mouth once daily., Disp: , Rfl: 0    atorvastatin (LIPITOR) 80 MG tablet, Take 1 tablet (80 mg total) by mouth once daily. (Patient taking differently: Take 80 mg by mouth every evening. ), Disp: 90 tablet, Rfl: 3    BUTALBITAL/ASPIRIN/CAFFEINE (FIORINAL ORAL), Take by mouth as needed., Disp: , Rfl:     DOC-Q-LACE 100 mg capsule, TAKE ONE CAPSULE BY MOUTH THREE TIMES A DAY AS NEEDED FOR CONSTIPATION, Disp: 90 capsule, Rfl: 3    levothyroxine (SYNTHROID) 112 MCG tablet, Take 1 tablet (112 mcg total) by mouth before breakfast., Disp: 90 tablet, Rfl: 3    methenamine (HIPREX) 1 gram Tab, Take 1 tablet (1 g total) by mouth 2 (two) times daily., Disp: 60 tablet, Rfl: 11    ondansetron (ZOFRAN) 8 MG tablet, Take 1 tablet (8 mg total) by mouth every 12 (twelve) hours as needed for Nausea., Disp: 60 tablet, Rfl: 3    oxybutynin (DITROPAN XL) 15 MG TR24, Take 1 tablet (15 mg total) by mouth once daily., Disp: 30 tablet, Rfl: 6    oxycodone-acetaminophen (PERCOCET)  mg per tablet, , Disp: , Rfl:     pantoprazole (PROTONIX) 40 MG tablet, Take 1 tablet (40 mg total) by mouth once daily., Disp: 90 tablet, Rfl: 3    SENNOSIDES (SENNA LAX ORAL), Take by mouth as needed., Disp: , Rfl:     sertraline (ZOLOFT) 100 MG tablet, Take 1 tablet (100 mg total) by mouth once daily., Disp: 30 tablet, Rfl: 1    trazodone (DESYREL) 50 MG tablet, Take 3 tablets (150 mg total) by mouth every evening., Disp: 90 tablet, Rfl:  3    PHYSICAL EXAMINATION:    Constitutional: She appears well-developed and well-nourished.  She is in no apparent distress.    Eyes: No scleral icterus noted bilaterally.  No discharge noted bilaterally.      Cardiovascular:  pitting edema noted in lower extremities bilaterally    Pulmonary/Chest: Effort normal. No respiratory distress.     Abdominal:  She exhibits no distension.  There is no CVA tenderness.     Lymphadenopathy:          Right: No supraclavicular adenopathy present.        Left: No supraclavicular adenopathy present.     Neurological: She is alert and oriented to person, place, and time.     Skin: Skin is warm and dry.     Psych: Cooperative with normal affect.    Very small amount of urine noted in bag.      Physical Exam   Abdominal:               IMPRESSION:    Encounter Diagnoses   Name Primary?    Encounter for suprapubic catheter care Yes    Neurogenic bladder     Urinary retention     Pitting edema        PLAN:    24cc of fluid was removed to deflate balloon.  There was some resistance noted with the catheter removal.  Explained to the patient that it is not uncommon to have some resistance given how the silicone catheter is made.  There was some blood noted upon removal of the 20fr catheter.  Suprapubic os was cleaned with betadine, lidocaine jelly was instilled in the tract.  Then a 20fr meadows catheter was placed sterilely.  30cc of sterile water was used to inflate the balloon.   Meadows catheter was irrigated and fluid was aspirated out without difficulty.  Catheter was connected to the bag with urine noted draining into bag.  Catheter was secured to leg bag.  Dressing was applied over s/p site.  Patient informed that she may experience blood in urine following today's change but it should resolve over a day or so.    Discussed going back to using a meadows catheter and plugging s/p tube.  She will like to continue with the SP tube.  Daughter states that home health will be discontinued  11/13.Daughter will contact home health to see why services are being discontinued.  She will call if if additional paperwork is needed from our office.   Continue suprapubic catheter changes using a 20fr silicone catheter filled with 30cc of sterile water by home health every 30 days.      Patient instructed to follow up with pcp or cardiology concerning lower extremity pitting edema.      Bryanna Sultana PA-C

## 2017-10-19 NOTE — TELEPHONE ENCOUNTER
Reason for Disposition   Swelling of face, arm or hands (Exception: slight puffiness of fingers during hot weather)    Protocols used: ST LEG SWELLING AND EDEMA-A-OH    Ms. Hawley states she has swelling in her face, hands, and legs. Patient states she also has a severe headache. Patient advised to call 911 or go to ED. Patient refused, she is requesting to speak with Dr. Hodges regarding a referral for a cardiologist.

## 2017-10-20 ENCOUNTER — PATIENT MESSAGE (OUTPATIENT)
Dept: INTERNAL MEDICINE | Facility: CLINIC | Age: 62
End: 2017-10-20

## 2017-10-20 ENCOUNTER — TELEPHONE (OUTPATIENT)
Dept: INTERNAL MEDICINE | Facility: CLINIC | Age: 62
End: 2017-10-20

## 2017-10-20 ENCOUNTER — TELEPHONE (OUTPATIENT)
Dept: PSYCHIATRY | Facility: CLINIC | Age: 62
End: 2017-10-20

## 2017-10-20 ENCOUNTER — OUTPATIENT CASE MANAGEMENT (OUTPATIENT)
Dept: ADMINISTRATIVE | Facility: OTHER | Age: 62
End: 2017-10-20

## 2017-10-20 RX ORDER — SERTRALINE HYDROCHLORIDE 100 MG/1
150 TABLET, FILM COATED ORAL DAILY
Qty: 30 TABLET | Refills: 1
Start: 2017-10-20 | End: 2017-10-30

## 2017-10-20 NOTE — TELEPHONE ENCOUNTER
----- Message from Paola Savage MA sent at 10/20/2017  1:44 PM CDT -----  Contact: pt  Pt stated that she need to speak with you when you get a chance .

## 2017-10-20 NOTE — TELEPHONE ENCOUNTER
Please advise. I already told the daughter that you weren't here and that it may be best to go to the ED if the swelling it the worst they have seen in awhile.

## 2017-10-20 NOTE — TELEPHONE ENCOUNTER
Spoke to Jaycee, her step daughter who is a nurse and active in patient's care.  She came to pt's last visit with me.  Pt continues to have a difficult time with her multiple medical problems and anxiety and depression.  The weaning off of Xanax will be completed in the next day or two.  Will increase Zoloft 100 mg to 11/2 tabs daily.   I also asked if the family had considered nursing home placement.  There have been some preliminary discussion among the family members, but not with the patient.  It seems clear that we are nearing the point where the pt's needs are exceeding the family's ability to meet those needs.

## 2017-10-20 NOTE — PROGRESS NOTES
"Patient was referred by [facility] RN Mel Cantrell for psychosocial support.  Chart reviewed. Patient has hx of MDD, Anxiety, Chronic Pain, Narcotic  Dependence-Continuous, and other co-morbid conditions.  Patient is followed by Dr. Oconnor in Psychiatry and she is enrolled in the paint management clinic.  An [facility] SW Assessment was completed today with patient's daughter, Lisa Thompson. This LCSW also spoke with the patient, however she was having difficulty focusing when attempting to complete a PHQ-9.  At one point, the patient went to get a piece of paper to write down the responses and forgot this LCSW was on the phone.  The patient was going to write down the responses to the PHQ-9 because she could not remember them.  Mrs. Thompson said the patient continues to be very depressed.  She said her anti-depressant medication was changed approximately two weeks ago, but she has noticed no change in her mood. Mrs. Thompson said patient has had a steady decline in her cognitive functioning over the last year including difficulty processing information, memory loss, sleep disturbances, restlessness, and passive suicidal ideation (she said patient has stated that she would be better off dead).  In speaking directly with the patient today, she stated that she does not have S/I but reported that she is having a "bad day."  [facility] sent an in-basket message to Dr. Hodges for follow-up, and CC'd Dr. Oconnor and [facility] RN Mel Cantrell.  Mrs. Thompson identified other social support needs for patient and requested that resource information be mailed to her [facility], [place], [place]: Difficulty affording medication (referred to PPAP and information on Medicaid discussed/mailed; Hearing difficulty (information mailed on [facility]); Caregiver stress and caregiver support - information provided on support groups and [facility] respite services (also mailed) - Mrs. Thompson said patient has already been contacted by the [facility] for assessment " date; [facility] literature on depression; anxiety; stress; caregiver coping tips mailed); [facility] Crisis Intervention Hotline (discussed and mailed).  LCSW will continue to follow.    Interventions:  Per POC    Plan:  Ensure that patient/caregiver has received and reviewed community resource information.  Review community resource information with patient/caregiver and provide support as needed.  Ensure follow-up/contact with community resource programs.  Encourage communication with providers.  Encourage medical/mental health appointment compliance.  Encourage medication compliance.

## 2017-10-20 NOTE — TELEPHONE ENCOUNTER
----- Message from Maria Esther Winter sent at 10/20/2017  1:15 PM CDT -----  Contact: Fay with [facility]  [phone]  Patient is having swelling in her legs again and she's no longer on Lasix.

## 2017-10-23 ENCOUNTER — TELEPHONE (OUTPATIENT)
Dept: PHARMACY | Facility: CLINIC | Age: 62
End: 2017-10-23

## 2017-10-23 ENCOUNTER — OUTPATIENT CASE MANAGEMENT (OUTPATIENT)
Dept: ADMINISTRATIVE | Facility: OTHER | Age: 62
End: 2017-10-23

## 2017-10-23 NOTE — LETTER
October 23, 2017    Tasha Hawley  [address]  [place] [address]             Outpatient Case Management  [address]  [place] [address] Dear Tasha Hawley:    We understand that receiving many services from different doctors and healthcare providers is overwhelming. There are appointments to make, transportation to arrange, dietary instructions to understand, and new medications to obtain.    This is where [facility] can help.     You are eligible to receive Outpatient Case Management services when you have healthcare needs that require the coordination of many providers, treatments, and services. You also qualify if you need assistance with a new treatment plan.     There is no charge for this support. You may have been referred to this program from your doctor(s), hospital staff member(s), or insurance company but you always have a choice to participate or not participate. To participate, you must give us your permission to be enrolled.     When you are enrolled in the [facility] program, the  who is assigned to you is    Fannie Marshall RN  [phone]    Depending on your needs and wishes, your  may speak with you by phone, visit you at your place of living (for example your home, skilled nursing facility, or rehabilitation facility), or meet you at your doctors office.     Your  will tell you why you have been selected to participate in the program and will complete an assessment of your needs. Then a personalized plan of care will be developed with you and or your caregiver.             Here are examples of the services your [facility]  provides.     Coordinate communication among multiple providers.   Arrange for transportation, doctors visits, durable medical equipment, home care services, and special clinics.    Provide coaching on how to manage your health condition.     Answer questions about your health condition.   Help you understand your doctors treatment plan.    Provide additional instruction about your health condition, treatments, and medications.    Help you obtain information about your insurance coverage.    Advocate for your individual needs.    Request a Licensed Clinical  (LCSW) to visit you if you need their services. LCSWs help with long term planning (discussing placement options, advanced planning directives), financial planning, and assistance (for example rent, utilities, medication funding).     Your  will coordinate their activities with other outpatient services you are receiving. All services provided by [facility]  are coordinated with and communicated to your primary care physician.    Our goal is to help you manage your health condition(s) safely within your living environment, whether that is your home or a medical facility. We want to help you function at the healthiest and highest level possible.     Sincerely,      Antonio Pacheco MD  Medical Director    Enclosures:    Frequently Asked Questions  Patient Rights and Responsibilities   Reporting a Grievance or Complaint  Consent/Release of Information  Stamped Addressed Envelope                  Frequently Asked Questions about [facility]    What is [facility]?  Outpatient Case management is not Home healthcare services. [facility]  do not provide hands-on care. [facility]  will work with your doctor to arrange for home health services, if needed. Home health services have a limited duration and there are some restrictions as to who can get these services. There is no prescribed limit to the amount of time you receive [facility] services. [facility]  are not agents of your insurance company. However, [facility]  can help you obtain information from your insurance company.     Who are the [facility] ?  [facility]  are Registered Nurses and Social Workers. It is important to remember that you and your  are a team that works together with your primary care physician to create your individualized plan of care. The ultimate goal of your care plan is to help you implement your doctors treatment plan and to help you function at the highest level of health possible.     What are my rights as a patient?  It is important for you to know and understand your rights and responsibilities while receiving services from the [facility] program. We have enclosed a complete description of your rights and responsibilities. You can help to make your care more effective when you understand your right and responsibilities.     What is needed to be enrolled in the program?  You are only enrolled in the [facility] Program when you give us your consent to participate. You will find enclosed a consent form. You are receiving this letter because you or your caregivers have given us a verbal consent to enroll you in [facility] Program. We ask that you sign and return the enclosed written consent in the stamped self-addressed envelope.                           Patient Rights and Responsibilities    We consider you a partner in your care. When you are well informed, participate in treatment decisions and communicate openly with your doctor and other healthcare professionals, you help make your care more effective.     While you are in the Outpatient Case Management Program, your rights include the following:     You have a right to be provided services in a non-discriminatory manner in accordance with the provisions of Title VI of the Civil Rights Act of 1964, Section 504 of the Rehabilitation Act of  1973, the Age Discrimination Act of 1975, the Americans with Disabilities Act as well as any other applicable Federal and State laws and regulations.     You have the right to a reasonable, timely response to your request or need for care, as well as the right to considerate and respectful care including an environment that preserves dignity and contributes to a positive self-image. You are responsible for being considerate and respectful of our staff.     You have a right to information regarding patient rights, advocacy services and complaint mechanisms, and the right to prompt resolution of any complaint. You or a designee has the right to participate in the resolution of ethical issues surrounding your care. You have a right to file a complaint if you feel that your rights have been infringed, without fear or penalty from [facility] or the federal government. You may file a complaint with the Director of Outpatient Case Management by calling [phone]. At any time, you may lodge a grievance with the [facility] and [facility] by calling [phone], or the Joint Commission on Accreditation of Healthcare Organizations at [phone].     You, or someone acting on your behalf, have the right to understandable information on your health status, treatment and progress in order to make decisions. You have the right to know the nature, risks and alternatives to treatment. You have the right to be informed, when appropriate, regarding the outcome of the care that has been provided. You have the right to refuse treatment to the extent permitted by law, and the right to be informed of the alternatives and consequences of refusing treatment.     You, in collaboration with your physician, have the right to make decisions regarding care and the right to participate in the development and implementation of the plan of care and effective pain management. You have the right to know the name and  professional status of those responsible for the delivery of your care and treatment.       You have a right, within legal guidelines, to have a guardian, next-of-kin or legal designee exercises your patient rights when you are unable to do so. You have the right for your wishes regarding end-of-life decisions to be addressed by the healthcare team through advance directives. You have the right to personal privacy and confidentiality and to expect confidentiality of all records and communications pertaining to your care.      You have the right to receive communications about your health information confidentially. You have the right to request restrictions on the uses and disclosures of your health information. You have the right to inspect, copy, request amendments and receive an accounting of to whom we have disclosed your health information.     You have the right to be provided with interpretation services if you do not speak English; to alternative communication techniques if you are hearing or vision impaired; and to have any other resources needed on your behalf to ensure effective communication. These services are provided free of charge.     You have a right to personal safety (free from mental, physical, sexual and verbal abuse, neglect and exploitation). You have the right to access protective and advocacy services.     Advance Directives  A Patient Advocate is available to meet with patients to answer questions regarding advance directives.    Living Will  A document that outlines what medical treatment the patient does or does not want in the event the patient becomes unable to make those decisions at the appropriate time.    Durable Medical Power of   A document by which the patient designates an individual to be responsible for making medical decisions in the event the patient becomes unable to do so.    HIPAA Notice of Privacy Practices  Your medical information is governed by federal  privacy laws. HIPAA protects private medical information and how that information is disclosed. If you have a question regarding the HIPAA Notice of Privacy Practices, or if you believe your privacy rights have been violated, you may call our designated hotline at ([phone]) [phone].            Quality Improvement  Because we consistently strive to improve the care and service provided to our patients, we welcome your feedback. Your comments are an important part of our quality improvement process, as we like to know what we are doing right and which areas are in need of improvement. Our policy is to listen, be responsive and provide you with an appropriate and timely follow-up to your questions or concerns. Our goal is active patient and family involvement in all aspects of the care process.          Reporting a Grievance or Complaint    During your time with the [facility] team you may have a grievance or complaint with our services. Your Patients Bill of Rights gives patients, families, and caregivers the right to express concerns and grievances and the right to expect a reasonable and timely response.     Your presentation of your concerns is not viewed negatively. It is an opportunity for us to improve the quality of our care and services we provide to you.     You may report your concerns directly to your , or you can phone in a complaint to:     Director of Outpatient Case Management  [phone]    You may also send a complaint letter to:    Director of Outpatient Case Management Services  [address] [place] [phone]    Tell us the details of your complaint and provide us with a contact phone number so we can contact you to obtain additional information. We will return a call to you within two business days of our receipt of your complaint, and to request additional information as needed. If you choose to mail a letter, your complaint may take a few days  longer to reach us.     All grievances will be addressed as quickly as possible. A grievance or complaint that involves situations or practices which place patients in immediate danger will be addressed as an urgent matter. We will work to resolve all other complaints within seven days of receipt. By that time, you will receive a phone call with either the resolution of your complaint, or a plan for corrective action. A formal written response will be sent to you within 30 days of receipt of your grievance.     If a resolution cannot be completed within 30 days, a letter will be sent to you or your family member with an estimated time for the final response.    Additionally, all patients have the right to file complaints with external agencies, without exception. Complaints/grievances can be addressed to the following agencies:            Patient Safety or Quality of Care Concerns  Office of Quality Monitoring   [facility] [place] [address]  [phone] Toll Free    HIPPA Privacy/Security Concerns  Office for Civil Rights Region IV  [place] Department of Health & Human Services  [address] [place] [address], [address]  [place] [address] [phone] Phone  [phone] TDD  [phone] Toll Free    Medicare/Medicaid Billing Concerns  [place] for Medicare & Medicaid Services  Region [address]  [address] [place] [address], [address]  [place] [address] [phone] Phone  [phone] Toll Free    General Concerns  [facility] ([facility])  [phone] Toll Free Complaint Hotline                                        Consent Form/Release of Information    By signing--     (1) I agree I have read the Outpatient Case Management information provided to me;     (2) I agree to voluntarily participate in the Outpatient Case Management program;     (3) I understand I must consent to participation in the Outpatient Case Management program during my first interview  with my ;    (4) I consent to the discussion and release of my personal health information to my healthcare team (including my personal physician, my medical home care team, any specialty physician(s), and my [facility] team);     (5) I agree my consent is valid for the length of time I am receiving Outpatient Case Management;    (6) I agree to referrals to community resources which my Case Management team recommends for me. I agree to the release of my personal information and personal health information as necessary to referral sources.    ___________________________________________________________________  Patients Printed Name     ___________________________________________________________________  Patients Signature       Date    If patient is in being cared for, please complete this section:     ___________________________________________________________________  Printed Name of Person Caring For Patient   Relationship To Patient    ___________________________________________________________________   Signature of Person Caring For Patient     Date    PLEASE SIGN AND RETURN IN THE ENCLOSED PRE-ADDRESSED ENVELOPE.

## 2017-10-23 NOTE — PROGRESS NOTES
Please note an Outpatient Complex Care Management welcome packet and consent form was created and mailed to the patient on 10/23/17.    Please contact [facility] at [phone] [phone] with any questions.    Thank you,    Jasmin Mccauley, SSC

## 2017-10-24 ENCOUNTER — TELEPHONE (OUTPATIENT)
Dept: PSYCHIATRY | Facility: CLINIC | Age: 62
End: 2017-10-24

## 2017-10-24 ENCOUNTER — TELEPHONE (OUTPATIENT)
Dept: INTERNAL MEDICINE | Facility: CLINIC | Age: 62
End: 2017-10-24

## 2017-10-24 ENCOUNTER — PATIENT MESSAGE (OUTPATIENT)
Dept: PSYCHIATRY | Facility: CLINIC | Age: 62
End: 2017-10-24

## 2017-10-24 RX ORDER — FLUOXETINE HYDROCHLORIDE 20 MG/1
CAPSULE ORAL
Qty: 90 CAPSULE | Refills: 1 | OUTPATIENT
Start: 2017-10-24

## 2017-10-24 NOTE — TELEPHONE ENCOUNTER
----- Message from Aviva Anthony sent at 10/24/2017 12:12 PM CDT -----  Contact: [facility]-Alecia@[phone]  They would like a call from the nurse in regards to the orders that was fax on the pt they said they did not get all of them,please advise

## 2017-10-24 NOTE — TELEPHONE ENCOUNTER
Received a message from Jaycee, the nurse step daughter that pt has not slept in 4 nights.  I suspect pt is sleeping during the day and has her days and nights mixed up.  Before adding another medication to help with sleep, I suggested we first try eliminating naps and have pt be more active during the day.  She will visit pt today and assess the situation.

## 2017-10-29 RX ORDER — PANTOPRAZOLE SODIUM 40 MG/1
TABLET, DELAYED RELEASE ORAL
Qty: 90 TABLET | Refills: 2 | Status: SHIPPED | OUTPATIENT
Start: 2017-10-29 | End: 2018-02-01 | Stop reason: SDUPTHER

## 2017-10-30 ENCOUNTER — TELEPHONE (OUTPATIENT)
Dept: PSYCHIATRY | Facility: CLINIC | Age: 62
End: 2017-10-30

## 2017-10-30 ENCOUNTER — OUTPATIENT CASE MANAGEMENT (OUTPATIENT)
Dept: ADMINISTRATIVE | Facility: OTHER | Age: 62
End: 2017-10-30

## 2017-10-30 RX ORDER — PAROXETINE 30 MG/1
30 TABLET, FILM COATED ORAL EVERY MORNING
Qty: 30 TABLET | Refills: 1 | Status: SHIPPED | OUTPATIENT
Start: 2017-10-30 | End: 2017-11-20

## 2017-10-30 NOTE — TELEPHONE ENCOUNTER
----- Message from Sammi Hernandez RN sent at 10/30/2017 12:06 PM CDT -----  Contact: Daughter-Lisa christian [phone]      ----- Message -----  From: Mel Cantrell RN  Sent: 10/30/2017  11:54 AM  To: Anastasia Weber      Hi. This is Mel Cantrell RN. I am with the Outpatient case management team with [facility]. I received a referral on the above patient. I spoke with patient's daughter-Lisa([phone] today on the telephone.     reports that patient HAS NOT SLEPT IN THE PAST 5 DAYS, SHE THINKS THIS MAY BE FROM THE INCREASE IN HER ZOLOFT DOSAGE.     Please notify patient'S DAUGHTER of your recommendations.     Thank you,  Mel Cantrell RN

## 2017-10-30 NOTE — PROGRESS NOTES
10-30-17--Follow-up call completed with patient's daughter-Lisa Thompson ([phone]).  reports that patient had a follow-up with  on last week for problems with suprapubic catheter.  reports that MD discussed possibly plugging suprapubic catheter and resorting back to [facility], which they declined.  reports that patient remains very forgetful and still requires considerable assistance with all of activities.  reports that patient uses a walker or wheelchair to aid with ambulation.  reports that patient has not had any recent falls that she is aware of.  reports that patient is currently active with [facility] home health until November for nursing and PT. We went over information about fall prevention and safety in home setting. We went over importance of adherence to medication regimen. We went over importance of maintained follow-up with doctors.   reports that patient suffers from chronic back, hip leg and knee pain.  reports that patient has an intrathecal pump inserted with Dilaudid.  reports that patient also takes vicodin for pain.  reports that patient's pain is not corrected with pain medicine.  reports that patient recently had switch from vicodin to percocet and back to vicodin.  reports that patient bilateral lower extremity swelling improved greatly when she was off of vicodin.  reports that she is calling the pain management MD on today to discuss if Vicodin could be causing bilateral LE swelling. We went over information about the bodies response to pain.We went over importance of adherence to medication regimen. We went over importance of maintained follow-up with doctors.  reports that patient's home health nurse did call 's office to alert of bilateral LE swelling. Md called back and instructed patient to go in for urgent care appointment or go to ER- both of which patient  refused.  reports that patient is followed by  with psychiatry.  recently increased patient's zoloft, since she is weaning off of xanax.  reports that patient is not sleeping, she has been up for 5 days since the increase of zoloft. I will send a message to  alerting of above. Noted that  discussed with daughter-Jaycee need for different level of care for patient to go in a nursing home.  reports that patient has adamantly refused to be placed in a nursing home. We went over information about depression. We went over signs and symptoms of worsening of depression. We went over importance of adherence to medication regimen. We went over importance of maintained follow-up with doctors.  reports that she is working with [facility]- she has not been able to read through papers that were mailed, but plans to do so.   reports that she does not have the patient's medication list with her. Will complete medication reconciliation at next call.   reports that patient suffers from frequent UTIs.  reports that patient has her suprapubic cathter changed every month.  reports that patient's urine is clear and without an odor at present.  We went over information about UTI. We went over signs and symptoms of UTI. We went over importance of adherence to medication regimen. We went over importance of maintained follow-up with doctors. I       Follow-up Plan:  -Continue to educate about Depression. Review signs and symptoms of worsening of depression. Encourage patient to follow medication and treatment regimen. Encourage patient to maintain follow up with doctors.  -Continue to educate about UTI. Review signs and symptoms of UTI. Encourage patient to follow medication and treatment regimen. Encourage patient to maintain follow up with doctors.  -Continue to educate about fall prevention and safety in home setting.  Encourage  patient to follow medication and treatment regimen. Encourage patient to maintain follow up with doctors.  -Is patient still active with [facility].  -Continue to educate about the bodies response to pain.  Encourage patient to follow medication and treatment regimen. Encourage patient to maintain follow up with doctors.  -Collaborate with [facility] about available resources.  -Follow-up with 's recommendations  -Complete medication reconciliation.    Malcom MENDOZA CCM

## 2017-10-30 NOTE — TELEPHONE ENCOUNTER
Spoke to pt's daughter, Lisa.  She is concerned that pt continues to sleep poorly at night and she blames the increase in Zoloft.  She acknowledged that pt is sleeping at least an hour during the day.  I urged the family to try to eliminate naps during the day.  I agreed to try a different antidepressant than Zoloft.  Will switch to Paxil which should also help with pt's anxiety.

## 2017-11-06 ENCOUNTER — OUTPATIENT CASE MANAGEMENT (OUTPATIENT)
Dept: ADMINISTRATIVE | Facility: OTHER | Age: 62
End: 2017-11-06

## 2017-11-06 NOTE — PROGRESS NOTES
11-6-17--Follow-up call completed with patient's daughter-Lisa Thompson ([phone]).   reports that patient remains very forgetful and still requires considerable assistance with all of activities.  reports that patient uses a walker or wheelchair to aid with ambulation.  reports that patient has not had any recent falls that she is aware of.  reports that patient is currently active with [facility] for nursing and PT. We went over information about fall prevention and safety in home setting. We went over importance of adherence to medication regimen. We went over importance of maintained follow-up with doctors.   reports that patient suffers from chronic back, hip leg and knee pain.  reports that patient has an intrathecal pump inserted with Dilaudid.  reports that patient also takes vicodin for pain.  reports that patient's pain is not corrected with pain medicine.  reports that patient recently had switch from vicodin to percocet and back to vicodin.  reports that patient bilateral lower extremity swelling improved greatly when she was off of vicodin.  reports that she did speak to the outside of [facility] pain management MD on last week to discuss if Vicodin could be causing bilateral LE swelling.  reports that patient's pain management MD does not believe that the LE swelling is related to vicodin usage. We went over information about the bodies response to pain.We went over importance of adherence to medication regimen. We went over importance of maintained follow-up with doctors.  reports that patient was seen by an outside of [facility] podiatrist and her LE are now being wrapped again by .   reports that patient is followed by  with psychiatry.  spoke with  and has changes zoloft to paxil on last week.We went over information about depression. We  went over signs and symptoms of worsening of depression. We went over importance of adherence to medication regimen. We went over importance of maintained follow-up with doctors.  reports that she is working with [facility]- she has not been able to read through papers that were mailed, but plans to do so.   reports that she does not have the patient's medication list with her. Will complete medication reconciliation at next call.   reports that patient suffers from frequent UTIs.  reports that patient has her suprapubic cathter changed every month.  reports that patient's urine is clear and without an odor at present.  We went over information about UTI. We went over signs and symptoms of UTI. We went over importance of adherence to medication regimen. We went over importance of maintained follow-up with doctors. I       Follow-up Plan:  -Continue to educate about Depression. Review signs and symptoms of worsening of depression. Encourage patient to follow medication and treatment regimen. Encourage patient to maintain follow up with doctors.  -Continue to educate about UTI. Review signs and symptoms of UTI. Encourage patient to follow medication and treatment regimen. Encourage patient to maintain follow up with doctors.  -Continue to educate about fall prevention and safety in home setting.  Encourage patient to follow medication and treatment regimen. Encourage patient to maintain follow up with doctors.  -Is patient still active with [facility].  -Continue to educate about the bodies response to pain.  Encourage patient to follow medication and treatment regimen. Encourage patient to maintain follow up with doctors.  -Collaborate with [facility] about available resources.  -Complete medication reconciliation.    Malcom MENDOZA CCM

## 2017-11-07 ENCOUNTER — TELEPHONE (OUTPATIENT)
Dept: UROLOGY | Facility: CLINIC | Age: 62
End: 2017-11-07

## 2017-11-07 NOTE — TELEPHONE ENCOUNTER
----- Message from Karoline Land MA sent at 11/7/2017 11:10 AM CST -----  Contact: Home: [phone]   Home: [phone]   States that her tube keeps clogging and she has blood in her urine.

## 2017-11-08 ENCOUNTER — OFFICE VISIT (OUTPATIENT)
Dept: PSYCHIATRY | Facility: CLINIC | Age: 62
End: 2017-11-08
Payer: MEDICARE

## 2017-11-08 ENCOUNTER — OFFICE VISIT (OUTPATIENT)
Dept: UROLOGY | Facility: CLINIC | Age: 62
End: 2017-11-08
Payer: MEDICARE

## 2017-11-08 VITALS
HEART RATE: 59 BPM | HEIGHT: 64 IN | SYSTOLIC BLOOD PRESSURE: 99 MMHG | DIASTOLIC BLOOD PRESSURE: 63 MMHG | WEIGHT: 167.56 LBS | BODY MASS INDEX: 28.6 KG/M2

## 2017-11-08 DIAGNOSIS — I25.10 CORONARY ARTERY DISEASE INVOLVING NATIVE CORONARY ARTERY OF NATIVE HEART WITHOUT ANGINA PECTORIS: Chronic | ICD-10-CM

## 2017-11-08 DIAGNOSIS — F33.0 MAJOR DEPRESSIVE DISORDER, RECURRENT EPISODE, MILD DEGREE: Primary | ICD-10-CM

## 2017-11-08 DIAGNOSIS — N31.9 NEUROGENIC BLADDER: Primary | Chronic | ICD-10-CM

## 2017-11-08 PROCEDURE — 99999 PR PBB SHADOW E&M-EST. PATIENT-LVL III: CPT | Mod: PBBFAC,,, | Performed by: UROLOGY

## 2017-11-08 PROCEDURE — 51705 CHANGE OF BLADDER TUBE: CPT | Mod: S$GLB,,, | Performed by: UROLOGY

## 2017-11-08 PROCEDURE — 99214 OFFICE O/P EST MOD 30 MIN: CPT | Mod: S$GLB,,, | Performed by: PSYCHIATRY & NEUROLOGY

## 2017-11-08 PROCEDURE — 99499 UNLISTED E&M SERVICE: CPT | Mod: S$GLB,,, | Performed by: PSYCHIATRY & NEUROLOGY

## 2017-11-08 PROCEDURE — 99999 PR PBB SHADOW E&M-EST. PATIENT-LVL II: CPT | Mod: PBBFAC,,, | Performed by: PSYCHIATRY & NEUROLOGY

## 2017-11-08 PROCEDURE — 99213 OFFICE O/P EST LOW 20 MIN: CPT | Mod: 25,S$GLB,, | Performed by: UROLOGY

## 2017-11-08 PROCEDURE — 99499 UNLISTED E&M SERVICE: CPT | Mod: S$GLB,,, | Performed by: UROLOGY

## 2017-11-08 NOTE — PROGRESS NOTES
Outpatient Psychiatry Follow-Up Visit (MD/NP)    11/8/2017    Clinical Status of Patient:  Outpatient (Ambulatory)    Chief Complaint:  Tasha Hawley is a 62 y.o. female who presents today for follow-up of depression and anxiety.  Met with patient and daughter.      Interval History and Content of Current Session:  Interim Events/Subjective Report/Content of Current Session: She is doing better.  She was switched from Zoloft to Paxil and her anxiety is better controlled.  She will start taking it at bedtime in case it is contributing to some drowsiness.  There have been no falls recently.  She is thinking about  from her .    Psychotherapy:  · Target symptoms: depression, anxiety   · Why chosen therapy is appropriate versus another modality: relevant to diagnosis  · Outcome monitoring methods: self-report, observation, feedback from family  · Therapeutic intervention type: supportive psychotherapy  · Topics discussed/themes: stress related to medical comorbidities, building skills sets for symptom management, symptom recognition  · The patient's response to the intervention is accepting. The patient's progress toward treatment goals is good.   · Duration of intervention: 10 minutes.    Review of Systems   · PSYCHIATRIC: Pertinant items are noted in the narrative.    Past Medical, Family and Social History: The patient's past medical, family and social history have been reviewed and updated as appropriate within the electronic medical record - see encounter notes.    Compliance: yes    Side effects: drowsiness?    Risk Parameters:  Patient reports no suicidal ideation  Patient reports no homicidal ideation  Patient reports no self-injurious behavior  Patient reports no violent behavior    Exam (detailed: at least 9 elements; comprehensive: all 15 elements)   Constitutional  Vitals:  Most recent vital signs, dated less than 90 days prior to this appointment, were reviewed.   There were no vitals  filed for this visit.     General:  unremarkable, age appropriate     Musculoskeletal  Muscle Strength/Tone:  no tremor   Gait & Station:  uses walker     Psychiatric  Speech:  no latency; no press   Mood & Affect:  anxious  congruent and appropriate   Thought Process:  normal and logical   Associations:  intact   Thought Content:  normal, no suicidality, no homicidality, delusions, or paranoia   Insight:  intact   Judgement: behavior is adequate to circumstances   Orientation:  grossly intact   Memory: intact for content of interview   Language: grossly intact   Attention Span & Concentration:  able to focus   Fund of Knowledge:  intact and appropriate to age and level of education     Assessment and Diagnosis   Status/Progress: Based on the examination today, the patient's problem(s) is/are improved.  New problems have not been presented today.   Co-morbidities are complicating management of the primary condition.  There are no active rule-out diagnoses for this patient at this time.     General Impression: Anxiety/depression better      ICD-10-CM ICD-9-CM   1. Major depressive disorder, recurrent episode, mild degree F33.0 296.31       Intervention/Counseling/Treatment Plan   · Medication Management: Continue current medications.      Return to Clinic: 1 month

## 2017-11-08 NOTE — PROGRESS NOTES
CHIEF COMPLAINT:    Mrs. Hawley is a 62 y.o. female presenting for an urgent appointment for urge incontinence, in a patient with suprapubic tube placed for incomplete bladder emptying, recurrent UTI    PRESENTING ILLNESS:    Tasha Hawley is a 62 y.o. female who returns today because her suprapubic tube was not draining well.  She had noted urge incontinence from the urethra.  She states that the last time the catheter was changed was 4/18/2017.  She has an aspect of stress incontinence so she will leak occasionally.  She wears a pad when she goes out.   Her daughter who accompanies her today states that she and her sister have done a fair amount research and found that many of the symptoms (peripheral edema and bladder symptoms) may be associated with Vicodin.      REVIEW OF SYSTEMS:    Review of Systems   Constitutional: Negative.    HENT: Negative.    Eyes: Negative.    Respiratory: Negative.    Cardiovascular: Negative.    Gastrointestinal: Negative.    Genitourinary: Positive for urgency.   Musculoskeletal: Positive for back pain and neck pain.   Skin: Negative.    Neurological: Negative.    Endo/Heme/Allergies: Negative.    Psychiatric/Behavioral: Negative.      PATIENT HISTORY:    Past Medical History:   Diagnosis Date    Anxiety     Arthritis     Bilateral lower extremity edema     severe chronic    Carotid artery occlusion     Cataract     Depression     Fever blister     Hypothyroid     Iron deficiency anemia     Lumbar radiculopathy     with chronic pain    Ocular migraine     Sleep apnea     cpap       Past Surgical History:   Procedure Laterality Date    BACK SURGERY      x 12    CATARACT EXTRACTION W/  INTRAOCULAR LENS IMPLANT Left     Dr Coleman     HYSTERECTOMY  1975    endometriosis    pain pump placement      SQ Dilaudid Pump managed by Dr. Hillman, Pain Management    SPINAL CORD STIMULATOR REMOVAL      before [place]    SPINE SURGERY  5-13-13    CERVICAL FUSION        Family History   Problem Relation Age of Onset    Cancer Mother 55     breast    Cancer Father      esophagus,had laryngectomy    Esophageal cancer Father     Parkinsonism Maternal Grandmother     Tremor Maternal Grandmother     Heart disease Maternal Uncle      Social History    Marital status:      Social History Main Topics    Smoking status: Never Smoker    Smokeless tobacco: Never Used    Alcohol use No      Comment: denies    Drug use: No    Sexual activity: No       Allergies:  (d)-limonene flavor; Bactrim [sulfamethoxazole-trimethoprim]; Imitrex [sumatriptan succinate]; Penicillins; Topamax [topiramate]; Vancomycin; Butorphanol tartrate; Darvocet a500 [propoxyphene n-acetaminophen]; Fentanyl; White petrolatum-zinc; Zinc oxide-white petrolatum; and Latex, natural rubber    Medications:  Outpatient Encounter Prescriptions as of 11/8/2017   Medication Sig Dispense Refill    ascorbic acid, vitamin C, (VITAMIN C) 500 mg Chew Take 1 tablet by mouth 3 (three) times daily. 90 each 11    atorvastatin (LIPITOR) 80 MG tablet Take 1 tablet (80 mg total) by mouth once daily. (Patient taking differently: Take 80 mg by mouth every evening. ) 90 tablet 3    BUTALBITAL/ASPIRIN/CAFFEINE (FIORINAL ORAL) Take by mouth as needed.      DOC-Q-LACE 100 mg capsule TAKE ONE CAPSULE BY MOUTH THREE TIMES A DAY AS NEEDED FOR CONSTIPATION 90 capsule 3    levothyroxine (SYNTHROID) 112 MCG tablet Take 1 tablet (112 mcg total) by mouth before breakfast. 90 tablet 3    methenamine (HIPREX) 1 gram Tab Take 1 tablet (1 g total) by mouth 2 (two) times daily. 60 tablet 11    ondansetron (ZOFRAN) 8 MG tablet Take 1 tablet (8 mg total) by mouth every 12 (twelve) hours as needed for Nausea. 60 tablet 3    oxybutynin (DITROPAN XL) 15 MG TR24 Take 1 tablet (15 mg total) by mouth once daily. 30 tablet 6    oxycodone-acetaminophen (PERCOCET)  mg per tablet       pantoprazole (PROTONIX) 40 MG tablet TAKE ONE  TABLET BY MOUTH EVERY DAY. 90 tablet 2    paroxetine (PAXIL) 30 MG tablet Take 1 tablet (30 mg total) by mouth every morning. 30 tablet 1    SENNOSIDES (SENNA LAX ORAL) Take by mouth as needed.      trazodone (DESYREL) 50 MG tablet Take 3 tablets (150 mg total) by mouth every evening. 90 tablet 3    aspirin (ECOTRIN) 81 MG EC tablet Take 1 tablet (81 mg total) by mouth once daily.  0     No facility-administered encounter medications on file as of 11/8/2017.          PHYSICAL EXAMINATION:    The patient generally appears in good health, is appropriately interactive, and is in no apparent distress.    Skin: No lesions.    Mental: Cooperative with normal affect.    Neuro: Grossly intact.    HEENT: Normal. No evidence of lymphadenopathy.    Chest:  normal inspiratory effort.    Abdomen:  Soft, non-tender. No masses or organomegaly. Bladder is not palpable. No evidence of flank discomfort. No evidence of inguinal hernia.  Suprapubic tube site is CDI.  There is a drain sponge on it with paper tape.      Extremities: No clubbing, cyanosis, or edema      LABS:    Lab Results   Component Value Date    BUN 9 08/31/2017    CREATININE 0.7 08/31/2017     IMPRESSION:    Incomplete bladder emptying    PLAN:    1.  Removed the old catheter.  Inserted a new 20 Fr silicone catheter and filled the balloon with 30 ml  2.  New leg bag,  Dressing, Extension tubing and catheter fixation device were placed  3.  Culture was not sent because she has no fevers, MS change.

## 2017-11-09 ENCOUNTER — TELEPHONE (OUTPATIENT)
Dept: GASTROENTEROLOGY | Facility: CLINIC | Age: 62
End: 2017-11-09

## 2017-11-09 ENCOUNTER — TELEPHONE (OUTPATIENT)
Dept: INTERNAL MEDICINE | Facility: CLINIC | Age: 62
End: 2017-11-09

## 2017-11-09 NOTE — TELEPHONE ENCOUNTER
----- Message from Prince Vance MD sent at 11/9/2017  4:26 PM CST -----  Contact: self - [phone]  Done orders placed    ----- Message -----  From: Maria Esther Lutz MA  Sent: 11/9/2017   9:53 AM  To: MD Dr Pinky Roman,  Can you put in orders for this or does patient have to come in and see someone first?  Maria Esther   ----- Message -----  From: Lisa Lesly  Sent: 11/9/2017   9:45 AM  To: Pinky vance - wants to get throat stretched - please call patient at [phone]

## 2017-11-09 NOTE — TELEPHONE ENCOUNTER
----- Message from Natasha Wilkerson sent at 11/8/2017  1:34 PM CST -----  Contact: Audrey/ [facility]/ [phone] ext [phone]   Audrey is calling to receive orders to recertify to home health. Please call and advise     Thank you

## 2017-11-09 NOTE — TELEPHONE ENCOUNTER
----- Message from Lisa Grace sent at 11/9/2017  9:45 AM CST -----  Contact: self - [phone]  [place]oor - wants to get throat stretched - please call patient at [phone]

## 2017-11-13 ENCOUNTER — HOSPITAL ENCOUNTER (EMERGENCY)
Facility: HOSPITAL | Age: 62
Discharge: HOME | End: 2017-11-13
Attending: EMERGENCY MEDICINE
Payer: MEDICARE

## 2017-11-13 VITALS
SYSTOLIC BLOOD PRESSURE: 114 MMHG | TEMPERATURE: 98 F | HEART RATE: 54 BPM | RESPIRATION RATE: 18 BRPM | BODY MASS INDEX: 26.63 KG/M2 | WEIGHT: 156 LBS | HEIGHT: 64 IN | OXYGEN SATURATION: 96 % | DIASTOLIC BLOOD PRESSURE: 70 MMHG

## 2017-11-13 DIAGNOSIS — R07.9 CHEST PAIN: Primary | ICD-10-CM

## 2017-11-13 DIAGNOSIS — R20.0 LEFT FACIAL NUMBNESS: ICD-10-CM

## 2017-11-13 LAB
ALBUMIN SERPL BCP-MCNC: 3.4 G/DL
ALP SERPL-CCNC: 106 U/L
ALT SERPL W/O P-5'-P-CCNC: 16 U/L
ANION GAP SERPL CALC-SCNC: 6 MMOL/L
AST SERPL-CCNC: 20 U/L
BASOPHILS # BLD AUTO: 0 K/UL
BASOPHILS NFR BLD: 0 %
BILIRUB SERPL-MCNC: 0.3 MG/DL
BNP SERPL-MCNC: 81 PG/ML
BUN SERPL-MCNC: 8 MG/DL
CALCIUM SERPL-MCNC: 8.7 MG/DL
CHLORIDE SERPL-SCNC: 101 MMOL/L
CO2 SERPL-SCNC: 32 MMOL/L
CREAT SERPL-MCNC: 0.8 MG/DL
DIFFERENTIAL METHOD: ABNORMAL
EOSINOPHIL # BLD AUTO: 0.2 K/UL
EOSINOPHIL NFR BLD: 4.2 %
ERYTHROCYTE [DISTWIDTH] IN BLOOD BY AUTOMATED COUNT: 14.6 %
EST. GFR  (AFRICAN AMERICAN): >60 ML/MIN/1.73 M^2
EST. GFR  (NON AFRICAN AMERICAN): >60 ML/MIN/1.73 M^2
GLUCOSE SERPL-MCNC: 95 MG/DL
HCT VFR BLD AUTO: 32.2 %
HGB BLD-MCNC: 9.9 G/DL
LYMPHOCYTES # BLD AUTO: 1.5 K/UL
LYMPHOCYTES NFR BLD: 31.4 %
MCH RBC QN AUTO: 27.3 PG
MCHC RBC AUTO-ENTMCNC: 30.7 G/DL
MCV RBC AUTO: 89 FL
MONOCYTES # BLD AUTO: 0.6 K/UL
MONOCYTES NFR BLD: 11.7 %
NEUTROPHILS # BLD AUTO: 2.5 K/UL
NEUTROPHILS NFR BLD: 52.7 %
PLATELET # BLD AUTO: 164 K/UL
PMV BLD AUTO: 10.1 FL
POTASSIUM SERPL-SCNC: 4.6 MMOL/L
PROT SERPL-MCNC: 6.5 G/DL
RBC # BLD AUTO: 3.63 M/UL
SODIUM SERPL-SCNC: 139 MMOL/L
TROPONIN I SERPL DL<=0.01 NG/ML-MCNC: 0.01 NG/ML
TROPONIN I SERPL DL<=0.01 NG/ML-MCNC: <0.006 NG/ML
WBC # BLD AUTO: 4.72 K/UL

## 2017-11-13 PROCEDURE — 84484 ASSAY OF TROPONIN QUANT: CPT

## 2017-11-13 PROCEDURE — 85025 COMPLETE CBC W/AUTO DIFF WBC: CPT

## 2017-11-13 PROCEDURE — 99285 EMERGENCY DEPT VISIT HI MDM: CPT

## 2017-11-13 PROCEDURE — 25000003 PHARM REV CODE 250: Performed by: EMERGENCY MEDICINE

## 2017-11-13 PROCEDURE — 93005 ELECTROCARDIOGRAM TRACING: CPT

## 2017-11-13 PROCEDURE — 80053 COMPREHEN METABOLIC PANEL: CPT

## 2017-11-13 PROCEDURE — 83880 ASSAY OF NATRIURETIC PEPTIDE: CPT

## 2017-11-13 PROCEDURE — 93010 ELECTROCARDIOGRAM REPORT: CPT | Mod: ,,, | Performed by: INTERNAL MEDICINE

## 2017-11-13 RX ORDER — ACETAMINOPHEN 500 MG
1000 TABLET ORAL
Status: COMPLETED | OUTPATIENT
Start: 2017-11-13 | End: 2017-11-13

## 2017-11-13 RX ADMIN — ACETAMINOPHEN 1000 MG: 500 TABLET ORAL at 09:11

## 2017-11-14 ENCOUNTER — OUTPATIENT CASE MANAGEMENT (OUTPATIENT)
Dept: ADMINISTRATIVE | Facility: OTHER | Age: 62
End: 2017-11-14

## 2017-11-14 ENCOUNTER — OFFICE VISIT (OUTPATIENT)
Dept: CARDIOLOGY | Facility: CLINIC | Age: 62
End: 2017-11-14
Payer: MEDICARE

## 2017-11-14 VITALS
WEIGHT: 171.5 LBS | SYSTOLIC BLOOD PRESSURE: 137 MMHG | HEIGHT: 64 IN | HEART RATE: 49 BPM | DIASTOLIC BLOOD PRESSURE: 65 MMHG | BODY MASS INDEX: 29.28 KG/M2

## 2017-11-14 DIAGNOSIS — I51.89 COMBINED SYSTOLIC AND DIASTOLIC CARDIAC DYSFUNCTION: Primary | Chronic | ICD-10-CM

## 2017-11-14 DIAGNOSIS — I77.9 BILATERAL CAROTID ARTERY DISEASE: ICD-10-CM

## 2017-11-14 DIAGNOSIS — E66.3 OVERWEIGHT (BMI 25.0-29.9): ICD-10-CM

## 2017-11-14 DIAGNOSIS — I27.20 PULMONARY HTN: ICD-10-CM

## 2017-11-14 DIAGNOSIS — R20.2 NUMBNESS AND TINGLING OF LEFT SIDE OF FACE: ICD-10-CM

## 2017-11-14 DIAGNOSIS — I89.0 LYMPHEDEMA OF BOTH LOWER EXTREMITIES: ICD-10-CM

## 2017-11-14 DIAGNOSIS — R00.1 BRADYCARDIA: ICD-10-CM

## 2017-11-14 DIAGNOSIS — E03.9 PRIMARY HYPOTHYROIDISM: Chronic | ICD-10-CM

## 2017-11-14 DIAGNOSIS — R20.0 NUMBNESS AND TINGLING OF LEFT SIDE OF FACE: ICD-10-CM

## 2017-11-14 DIAGNOSIS — I70.0 THORACIC AORTA ATHEROSCLEROSIS: ICD-10-CM

## 2017-11-14 DIAGNOSIS — I25.10 CORONARY ARTERY DISEASE INVOLVING NATIVE CORONARY ARTERY OF NATIVE HEART WITHOUT ANGINA PECTORIS: Chronic | ICD-10-CM

## 2017-11-14 DIAGNOSIS — R09.89 BRUIT OF RIGHT CAROTID ARTERY: ICD-10-CM

## 2017-11-14 PROCEDURE — 99499 UNLISTED E&M SERVICE: CPT | Mod: S$GLB,,, | Performed by: NURSE PRACTITIONER

## 2017-11-14 PROCEDURE — 99214 OFFICE O/P EST MOD 30 MIN: CPT | Mod: S$GLB,,, | Performed by: NURSE PRACTITIONER

## 2017-11-14 PROCEDURE — 99999 PR PBB SHADOW E&M-EST. PATIENT-LVL III: CPT | Mod: PBBFAC,,, | Performed by: NURSE PRACTITIONER

## 2017-11-14 NOTE — PROGRESS NOTES
"Chart reviewed.  Telephonic follow-up with patient's daughter, Lisa Thompson.  Lisa said she did receive and review the community resource information mailed by this LCSW.  She said patient applied for Medicaid and informed them that she was not entitled to her spouse's income due to a prenuptial agreement, but Lisa said it didn't matter.  The patient was denied the benefit.  Lisa said the Medicaid representative was rude and not very helpful.  She said has contact some of the other agencies listed in the information, but patient vacillates about receiving services and then Lisa doesn't know what to do.   Lisa said patient has been doing better with depression.  Patient has been compliant with mental health appointments.  Lisa said she is coping adequately but does have to "step back" from the stress of caregiving from time to time.  She was reminded of the caregiver support groups sent in the packet.  MICHAEL services/support discussed.  MICHAEL information mailed to Lisa Thompson at [facility] [address], [place] [address].  Supportive counseling provided.  LCSW will continue to follow.    Interventions:  Per POC      Plan:  Ensure that patient/caregiver has received and reviewed MICHAEL resource information.  Review community resource information with patient/caregiver and provide support as needed.  Ensure follow-up/contact with community resource programs  Encourage communication with providers as appropriate.  Encourage medical/mental health appointment compliance.  Encourage medication compliance.         "

## 2017-11-14 NOTE — PROVIDER PROGRESS NOTES - EMERGENCY DEPT.
Encounter Date: 11/13/2017    ED Physician Progress Notes       SCRIBE NOTE: I, Alberto Mao, am scribing for, and in the presence of,  Jesus Aquino MD.  Physician Statement: I, Jesus Aquino MD, personally performed the services described in this documentation as scribed by Alberto Mao in my presence, and it is both accurate and complete.          **This is an assumption of care note**    Case accepted from Dr. Fabian at 2134, pending 2nd troponin. Patient with chest pain earlier today, currently chest pain free. EKG sinus riley, initial troponin negative.   I agree with previous physician's history/physical and overall assessment.     10:01 PM Reassessment - Delta Troponin is negative here in the department. I discussed the plan for the patient to follow with cardiology tomorrow for their scheduled appointment at 1300. They are in agreeance with this plan and will be discharged in stable condition.     Impression  1. Chest pain    2. Left facial numbness      Disposition  Discharged    Upon re-evaluation, the patient's status has remained stable.    After complete ED evaluation, clinical impression is most consistent with chest pain.    At this time, I feel there is no emergent condition requiring further evaluation or admission. I believe the patient is stable for discharge from the ED. The patient and any additional family present were updated with test results, overall clinical impression, and recommended further plan of care. All questions were answered. The patient expressed understanding and agreed with current plan for discharge with Cardiology follow-up tomorrow. Strict return precautions were provided, including chest pain, SOB, return/worsening of current symptoms or any other concerns.

## 2017-11-14 NOTE — ED NOTES
To ER per EMS with c/o chest pain and numbness to left side of face starting at 1800 today.  Pt awake and alert, resp even and unlabored.  Lungs clear at this time.  Denies coughing.  abd soft and non tender with + BS noted to all quadrants.   Suprapubic catheter noted with clear urine to bag, no drainage noted to insertion site.  Bilateral lower legs with ROSARIO boots in place.  Boots removed per MD order.  Redness noted to both lower legs.  Right foot noted with healed shave wound to sole of foot and top of middle toe from previous procedure.  1+ pedal edema noted with + pedal pulses noted.  MD notified of boot removal.  Pt on 2l/m O2 upon arrival, placed on monitor and pulse ox.

## 2017-11-14 NOTE — ED PROVIDER NOTES
Encounter Date: 11/13/2017       History     Chief Complaint   Patient presents with    Chest Pain     chest pressure/pain intermittently x2 weeks. left sided facial numbness/tingling x2-3 days. pt. was given ntg. sl x2 and asa 325mg p.o. prior to arrival with pain decrease from 9 to 3.      HPI   This is a 62 y.o. female who has a past medical history of Anxiety; Arthritis; Bilateral lower extremity edema;   Carotid artery occlusion; Cataract; Depression; Fever blister; Hypothyroid; Iron deficiency anemia; Lumbar   radiculopathy; Ocular migraine; and Sleep apnea.     The patient presents to the Emergency Department with chest pain ×2 weeks.  Symptoms came on gradually, intermittent, lasting a few minutes at a time and resolving spontaneously.  She describes the pain as sharp and pinching on the left lateral chest, nonradiating, not worsening or improving.  Patient was given  and nitroglycerin sublingual ×2 with decrease in pain from 9/10 to 3/10.  Symptoms are associated with left-sided facial numbness and tingling ×2-3 days.  Pt denies headache, visual disturbance, focal numbness or weakness to arms/legs.   Symptoms are aggravated by inspiration and some movements.  Symptoms are relieved by nothing.  Further hx from daughter, who states that the patient has had these facial symptoms off and on before and is  not concerned for stroke.     Pt has a past surgical history that includes Hysterectomy (1975); Back surgery; pain pump placement;   Spine surgery (5-13-13); Cataract extraction w/  intraocular lens implant (Left); and Spinal cord stimulator removal.        Review of patient's allergies indicates:   Allergen Reactions    (d)-limonene flavor      Other reaction(s): difficult intubation  Other reaction(s): Difficulty breathing    Bactrim [sulfamethoxazole-trimethoprim] Anaphylaxis    Imitrex [sumatriptan succinate] Shortness Of Breath    Penicillins Shortness Of Breath     Other reaction(s): Jittery     Topamax [topiramate] Shortness Of Breath    Vancomycin Shortness Of Breath     Rash    Butorphanol tartrate     Darvocet a500 [propoxyphene n-acetaminophen]      Other reaction(s): Difficulty breathing    Fentanyl      Other reaction(s): Vomiting  Other reaction(s): Nausea  Other reaction(s): Itching  swelling    White petrolatum-zinc     Zinc oxide-white petrolatum      Other reaction(s): Difficulty breathing    Latex, natural rubber Itching and Rash     Past Medical History:   Diagnosis Date    Anxiety     Arthritis     Bilateral lower extremity edema     severe chronic    Carotid artery occlusion     Cataract     Depression     Fever blister     Hypothyroid     Iron deficiency anemia     Lumbar radiculopathy     with chronic pain    Ocular migraine     Sleep apnea     cpap     Past Surgical History:   Procedure Laterality Date    BACK SURGERY      x 12    CATARACT EXTRACTION W/  INTRAOCULAR LENS IMPLANT Left     Dr Coleman     HYSTERECTOMY  1975    endometriosis    pain pump placement      SQ Dilaudid Pump managed by Dr. Hillman, Pain Management    SPINAL CORD STIMULATOR REMOVAL      before [place]    SPINE SURGERY  5-13-13    CERVICAL FUSION     Family History   Problem Relation Age of Onset    Cancer Mother 55     breast    Cancer Father      esophagus,had laryngectomy    Esophageal cancer Father     Parkinsonism Maternal Grandmother     Tremor Maternal Grandmother     No Known Problems Brother     No Known Problems Brother     Heart disease Maternal Uncle     No Known Problems Sister     No Known Problems Maternal Aunt     No Known Problems Paternal Aunt     No Known Problems Paternal Uncle     No Known Problems Maternal Grandfather     No Known Problems Paternal Grandmother     No Known Problems Paternal Grandfather     Melanoma Neg Hx     Amblyopia Neg Hx     Blindness Neg Hx     Cataracts Neg Hx     Diabetes Neg Hx     Glaucoma Neg Hx     Hypertension Neg  Hx     Macular degeneration Neg Hx     Retinal detachment Neg Hx     Strabismus Neg Hx     Stroke Neg Hx     Thyroid disease Neg Hx     Colon cancer Neg Hx      Social History   Substance Use Topics    Smoking status: Never Smoker    Smokeless tobacco: Never Used    Alcohol use No      Comment: denies     Review of Systems   Constitutional: Negative for activity change and fever.   HENT: Negative for sore throat.    Eyes: Negative for visual disturbance.   Respiratory: Negative for chest tightness and shortness of breath.    Cardiovascular: Positive for chest pain and leg swelling (chronic). Negative for palpitations.   Gastrointestinal: Negative for abdominal pain, nausea and vomiting.   Genitourinary: Negative for dysuria.   Musculoskeletal: Negative for back pain.   Skin: Negative for rash.   Neurological: Positive for numbness. Negative for dizziness, speech difficulty, weakness and headaches.   Hematological: Does not bruise/bleed easily.   Psychiatric/Behavioral: The patient is not nervous/anxious.        Physical Exam     Initial Vitals [11/13/17 1858]   BP Pulse Resp Temp SpO2   (!) 146/64 (!) 46 20 -- 97 %      MAP       91.33         Physical Exam    Nursing note and vitals reviewed.  Constitutional: She appears well-developed and well-nourished. She is not diaphoretic. No distress.   HENT:   Head: Normocephalic and atraumatic.   Mouth/Throat: Oropharynx is clear and moist.   Eyes: Conjunctivae are normal. Pupils are equal, round, and reactive to light.   Neck: Neck supple.   Cardiovascular: Normal rate, regular rhythm and intact distal pulses.   Murmur (RSB) heard.  Pulmonary/Chest: Breath sounds normal. No respiratory distress. She has no wheezes. She has no rhonchi. She has no rales.   Abdominal: Soft. Bowel sounds are normal. She exhibits no distension. There is no tenderness. There is no guarding.   Musculoskeletal: Normal range of motion. She exhibits no edema or tenderness.   Unna boots in  place bilaterally   Neurological: She is alert and oriented to person, place, and time. She has normal strength. A sensory deficit (to left face) is present.   Neg pronator drift  Normal coordination upper extremities   Skin: Skin is warm and dry. Capillary refill takes less than 2 seconds. No rash noted.   Psychiatric: She has a normal mood and affect.         ED Course   Procedures  Labs Reviewed   CBC W/ AUTO DIFFERENTIAL - Abnormal; Notable for the following:        Result Value    RBC 3.63 (*)     Hemoglobin 9.9 (*)     Hematocrit 32.2 (*)     MCHC 30.7 (*)     RDW 14.6 (*)     All other components within normal limits   COMPREHENSIVE METABOLIC PANEL - Abnormal; Notable for the following:     CO2 32 (*)     Albumin 3.4 (*)     Anion Gap 6 (*)     All other components within normal limits   TROPONIN I   B-TYPE NATRIURETIC PEPTIDE   TROPONIN I     EKG Readings: (Independently Interpreted)   Initial Reading: No STEMI.   EKG: Sinus riley at 43 bpm, nl axis, nl intervals, no hypertrophy, no ST-T changes as read by me. There are no significant changes in comparison to previous EKG on 8/20/17.             Medical Decision Making:   Initial Assessment:   This is an emergent evaluation of a 62 y.o.female patient with presentation of intermittent chest pain and left facial tingling.   Initial differentials include but are not limited to: ACS, muscle spasm, pleurisy, mass, TIA, CVA.   Plan: Basic labs, troponin, EKG, cardiac monitoring, CT head    Clinical Tests:   Lab Tests: Ordered and Reviewed  Radiological Study: Ordered and Reviewed  Medical Tests: Reviewed and Ordered    Initial workup was unremarkable in the emergency department.  There is no evidence of acute stroke on head CT.  I recommended to the patient and family that she be admitted to the hospital for further evaluation, occluding without limited to cardiac monitoring, serial enzymes, stroke workup.    Both patient and family stressed that they wanted the  patient to go home.  I considered that since the patient has had chest pain for this long that her troponin being negative was very sensitive.  As well as a pinching type and not pressure like and located laterally.    I believe the patient needs very close follow-up in 1-2 days with her cardiologist and neurologist for further evaluation.  Advised the patient return if any concerns.                   ED Course      Clinical Impression:     1. Chest pain    2. Left facial numbness                                 Maurice Fabian MD  11/25/17 0734       Maurice Fabian MD  11/25/17 0741

## 2017-11-14 NOTE — PROGRESS NOTES
Attempted to update plan of care for OPCM; left voice message to return call.  LULI Marshall, OPCM-RN

## 2017-11-14 NOTE — PROGRESS NOTES
Ms. Hawley is a patient of  Dr. Powers and was last seen in [facility] Cardiology Visit 10/4/16.    Subjective:   Patient ID:  Tasha Hawley is a 62 y.o. female who presents for follow-up of Edema; Chest Pain (Ed f/u 11/13/17); and Left facial numbness    Problems:  CAD in mid LAD, subtotal  Carotid artery disease bilateral ICA < 50% by CTA Head and neck  Hypothyroid  Sleep apnea not using cpap   HFrEF, EF recovered from 40% in 2016 to 60-65%  Pulmonary hypertension, resolved  Former smoker    HPI  Ms. Hawley is in clinic today for routine follow up. Reports a 2 week h/o chest pressure lasting minutes and comes and goes throughout the day.  The chest pressure occurs daily.  Of note, she does have a hiatal hernia.  Movement of the left arm increases the pain. Inspiration increases the pain.  States that she has tenderness under her left breast and left axilla.  She was in the ED on 11/13/17: TRP neg, ECG unchanged, and BNP was negative.  Taking low dose ASA and high intensity statin.  Patient is taking atorvastatin 80mg and LDL is 56.  [facility] 1/7/16 for unstable angina: Single vessel disease.  The mid LAD has subtotal. There is BRENDEN 3 flow. There is collateral flow from the RCA (good). The remaining portion of the vessel is of small caliber.  Patient denies palpitations, SOB, JONES, dizziness, syncope, orthopnea, PND, or claudication.  Reports no routine exercise.  She has significant lower extremity edema secondary to lymphedema.  She is using a lymphedema pump twice a day for about an hour and a half.      Review of Systems   Constitution: Positive for malaise/fatigue. Negative for decreased appetite, diaphoresis, weakness, weight gain and weight loss.   Eyes: Negative for visual disturbance.   Cardiovascular: Positive for chest pain and leg swelling. Negative for claudication, dyspnea on exertion, irregular heartbeat, near-syncope, orthopnea, palpitations, paroxysmal nocturnal dyspnea and syncope.        Denies  chest pressure   Respiratory: Negative for cough, hemoptysis, shortness of breath, sleep disturbances due to breathing and snoring.    Endocrine: Negative for cold intolerance and heat intolerance.   Hematologic/Lymphatic: Negative for bleeding problem. Does not bruise/bleed easily.   Musculoskeletal: Negative for myalgias.   Gastrointestinal: Negative for bloating, abdominal pain, anorexia, change in bowel habit, constipation, diarrhea, nausea and vomiting.   Neurological: Negative for difficulty with concentration, disturbances in coordination, excessive daytime sleepiness, dizziness, headaches, light-headedness, loss of balance and numbness.   Psychiatric/Behavioral: The patient does not have insomnia.      Allergies and current medications updated and reviewed:  Review of patient's allergies indicates:   Allergen Reactions    (d)-limonene flavor      Other reaction(s): difficult intubation  Other reaction(s): Difficulty breathing    Bactrim [sulfamethoxazole-trimethoprim] Anaphylaxis    Imitrex [sumatriptan succinate] Shortness Of Breath    Penicillins Shortness Of Breath     Other reaction(s): Jittery    Topamax [topiramate] Shortness Of Breath    Vancomycin Shortness Of Breath     Rash    Butorphanol tartrate     Darvocet a500 [propoxyphene n-acetaminophen]      Other reaction(s): Difficulty breathing    Fentanyl      Other reaction(s): Vomiting  Other reaction(s): Nausea  Other reaction(s): Itching  swelling    White petrolatum-zinc     Zinc oxide-white petrolatum      Other reaction(s): Difficulty breathing    Latex, natural rubber Itching and Rash     Current Outpatient Prescriptions   Medication Sig    ascorbic acid, vitamin C, (VITAMIN C) 500 mg Chew Take 1 tablet by mouth 3 (three) times daily.    aspirin (ECOTRIN) 81 MG EC tablet Take 1 tablet (81 mg total) by mouth once daily.    atorvastatin (LIPITOR) 80 MG tablet Take 1 tablet (80 mg total) by mouth once daily. (Patient taking  "differently: Take 80 mg by mouth every evening. )    BUTALBITAL/ASPIRIN/CAFFEINE (FIORINAL ORAL) Take by mouth as needed.    DOC-Q-LACE 100 mg capsule TAKE ONE CAPSULE BY MOUTH THREE TIMES A DAY AS NEEDED FOR CONSTIPATION    levothyroxine (SYNTHROID) 112 MCG tablet Take 1 tablet (112 mcg total) by mouth before breakfast.    methenamine (HIPREX) 1 gram Tab Take 1 tablet (1 g total) by mouth 2 (two) times daily.    ondansetron (ZOFRAN) 8 MG tablet Take 1 tablet (8 mg total) by mouth every 12 (twelve) hours as needed for Nausea.    oxybutynin (DITROPAN XL) 15 MG TR24 Take 1 tablet (15 mg total) by mouth once daily.    oxycodone-acetaminophen (PERCOCET)  mg per tablet     pantoprazole (PROTONIX) 40 MG tablet TAKE ONE TABLET BY MOUTH EVERY DAY.    paroxetine (PAXIL) 30 MG tablet Take 1 tablet (30 mg total) by mouth every morning.    SENNOSIDES (SENNA LAX ORAL) Take by mouth as needed.    trazodone (DESYREL) 50 MG tablet Take 3 tablets (150 mg total) by mouth every evening.     No current facility-administered medications for this visit.      Objective:     Right Arm BP - Sitting: (P) 126/63 (11/14/17 1345)  Left Arm BP - Sitting: (P) 137/65 (11/14/17 1345)    /65 (BP Location: Left arm, Patient Position: Sitting, BP Method: Large (Automatic))   Pulse (!) 49   Ht 5' 4" (1.626 m)   Wt 77.8 kg (171 lb 8.3 oz)   LMP  (LMP Unknown)   BMI 29.44 kg/m²     Physical Exam   Constitutional: She is oriented to person, place, and time. She appears well-developed and well-nourished. She is active and cooperative. No distress.   HENT:   Head: Normocephalic and atraumatic.   Eyes: Conjunctivae and lids are normal. No scleral icterus.   Neck: Neck supple. Normal carotid pulses, no hepatojugular reflux and no JVD present. Carotid bruit is present (Right).   Cardiovascular: Regular rhythm, S1 normal, S2 normal and intact distal pulses.  Bradycardia present.  PMI is not displaced.  Exam reveals no gallop and no " friction rub.    Murmur heard.   Harsh midsystolic murmur is present with a grade of 2/6  at the upper right sternal border radiating to the neck  Pulses:       Carotid pulses are 2+ on the right side, and 2+ on the left side.       Radial pulses are 2+ on the right side, and 2+ on the left side.        Dorsalis pedis pulses are 2+ on the right side, and 2+ on the left side.        Posterior tibial pulses are 1+ on the right side, and 1+ on the left side.   Pulmonary/Chest: Effort normal and breath sounds normal. No respiratory distress. She has no decreased breath sounds. She has no wheezes. She has no rhonchi. She has no rales. She exhibits tenderness (Under left breast into left axilla).   Abdominal: Soft. Normal appearance and bowel sounds are normal. She exhibits no distension, no fluid wave, no abdominal bruit, no ascites and no pulsatile midline mass. There is no hepatosplenomegaly. There is no tenderness.   Musculoskeletal: She exhibits edema (BLE, non-pitting. Extremities tender to the touch) and tenderness.   Neurological: She is alert and oriented to person, place, and time. Gait abnormal.   Skin: Skin is warm, dry and intact. No rash noted. She is not diaphoretic. Nails show no clubbing.   Psychiatric: Her behavior is normal. Judgment and thought content normal. Her speech is slurred. Cognition and memory are normal. She exhibits a depressed mood.   Nursing note and vitals reviewed.      Chemistry        Component Value Date/Time     11/13/2017 1921    K 4.6 11/13/2017 1921     11/13/2017 1921    CO2 32 (H) 11/13/2017 1921    BUN 8 11/13/2017 1921    CREATININE 0.8 11/13/2017 1921    GLU 95 11/13/2017 1921        Component Value Date/Time    CALCIUM 8.7 11/13/2017 1921    ALKPHOS 106 11/13/2017 1921    AST 20 11/13/2017 1921    ALT 16 11/13/2017 1921    BILITOT 0.3 11/13/2017 1921    ESTGFRAFRICA >60 11/13/2017 1921    EGFRNONAA >60 11/13/2017 1921          Recent Labs  Lab 06/22/16  0331   05/14/17  1537  08/20/17  2049  08/31/17  1113 11/13/17  1921   WHITE BLOOD CELL COUNT 8.47  < > 5.04  < > 7.19  < > 3.88 L 4.72   HEMOGLOBIN 11.6 L  < > 10.3 L  < > 12.9  < > 11.4 L 9.9 L   HEMATOCRIT 37.0  < > 32.1 L  < > 38.5  < > 35.5 L 32.2 L   MCV 89  < > 84  < > 81 L  < > 85 89   PLATELETS 224  < > 212  < > 217  < > 175 164   BNP 39  --  91  --   --   --   --  81   TSH  --   < >  --   < > 5.063 H  --   --   --    CHOLESTEROL  --   --   --   --   --   --  111 L  --    HDL  --   --   --   --   --   --  42  --    LDL CHOLESTEROL  --   --   --   --   --   --  56.2 L  --    TRIGLYCERIDES  --   --   --   --   --   --  64  --    HDL/CHOLESTEROL RATIO  --   --   --   --   --   --  37.8  --    < > = values in this interval not displayed.         Test(s) Reviewed  I have reviewed the following in detail:  [x] Stress test   [x] Angiography   [x] Echocardiogram   [x] Labs   [] Other:       Assessment/Plan:     Combined systolic and diastolic cardiac dysfunction  Comments:  Well compensated. BNP <100 on 11/13/17. No changes to current regimen.     Pulmonary HTN    Coronary artery disease involving native coronary artery of native heart without angina pectoris  Comments:  Asymptomatic. Taking high intensity statin therapy and ASA. No changes.     Thoracic aorta atherosclerosis    Primary hypothyroidism    Bradycardia  Comments:  HR in the 40s. SB on ECG yesterday. Hypothyroidism could be contributing factor. Due for repeat TSH. No medications to slow HR. Instructed to f/u with PCP    Overweight (BMI 25.0-29.9)  Comments:  BMI 29    Lymphedema of both lower extremities  Comments:  Using lymphedema pumps BID. Not wearing compression stockings.     Bruit of right carotid artery  Comments:  Bruit heard on right. Previous h/o carotid artery disease, <50%, seen on CT in 2015. Ultrasound ordered.   Orders:  -     Cardiology Lab Carotid US Bilateral; Future    Bilateral carotid artery disease    Numbness and tingling of left side  of face  Comments:  CT head on 11/13/17 no acute changes. Instructed to f/u with neurology and PCP      Return in about 6 months (around 5/14/2018).

## 2017-11-14 NOTE — ED NOTES
Discharge instructions given and explained at this time.  Suprapubic catheter emptied of 1700 ml clear yellow urine.  Denies pain, SOB, or numbness to face at this time. Family member at the bedside.

## 2017-11-14 NOTE — PATIENT INSTRUCTIONS
Follow up with neurology for numbness and tingling in left side of your face.    Schedule an ultrasound of your carotids.

## 2017-11-18 ENCOUNTER — TELEPHONE (OUTPATIENT)
Dept: PSYCHIATRY | Facility: CLINIC | Age: 62
End: 2017-11-18

## 2017-11-18 NOTE — TELEPHONE ENCOUNTER
----- Message from Lina Cobian sent at 11/17/2017  2:14 PM CST -----  Contact: pt   Pt stated meds  paroxetine (PAXIL) 30 MG tablet  Is not working.    Her cb #  [phone]

## 2017-11-20 ENCOUNTER — TELEPHONE (OUTPATIENT)
Dept: PSYCHIATRY | Facility: CLINIC | Age: 62
End: 2017-11-20

## 2017-11-20 ENCOUNTER — TELEPHONE (OUTPATIENT)
Dept: UROLOGY | Facility: CLINIC | Age: 62
End: 2017-11-20

## 2017-11-20 RX ORDER — FLUOXETINE HYDROCHLORIDE 20 MG/1
60 CAPSULE ORAL DAILY
Qty: 90 CAPSULE | Refills: 2 | Status: SHIPPED | OUTPATIENT
Start: 2017-11-20 | End: 2018-02-25 | Stop reason: SDUPTHER

## 2017-11-20 NOTE — TELEPHONE ENCOUNTER
----- Message from Loren John sent at 11/20/2017  9:53 AM CST -----  Contact: Self- [phone]  Maria Esther- pt called to speak with staff- says her tube isn't draining properly- pt is urinating through her vagina- please call pt back at [phone]

## 2017-11-20 NOTE — TELEPHONE ENCOUNTER
----- Message from Gita Hernandez MA sent at 11/20/2017  9:50 AM CST -----  Contact: pt  Pt requesting you darren her to discuss: anxiety has increased and new medication is not working.      [phone]

## 2017-11-20 NOTE — TELEPHONE ENCOUNTER
Pt reports increased anxiety and blames Paxil.  She is having marital problems.  She requested that she go back on Prozac.  She also mentioned that she was taking a fourth of a Xanax.  I told her not to do that because of the concurrent use of opiates.

## 2017-11-21 ENCOUNTER — TELEPHONE (OUTPATIENT)
Dept: UROLOGY | Facility: CLINIC | Age: 62
End: 2017-11-21

## 2017-11-21 ENCOUNTER — OUTPATIENT CASE MANAGEMENT (OUTPATIENT)
Dept: ADMINISTRATIVE | Facility: OTHER | Age: 62
End: 2017-11-21

## 2017-11-21 NOTE — TELEPHONE ENCOUNTER
C/o urinary incontinence, is soaking through pads very quickly. States she did not let HH change catheter because she wants a bigger catheter. States she wants the whole system changed not just the catheter. States she discussed w/ provider before that she may need a larger sized catheter to help with the draining. Pt requested to see ALEXY Vigil if Dr. Mayo is not available. Scheduled for visit on tomorrow, 11/22/17.

## 2017-11-21 NOTE — PROGRESS NOTES
Attempted to update plan of care for OPCM; left voice message to return call for daughter Lisa Thompson.  LULI Marshall, OPCM-RN

## 2017-11-22 ENCOUNTER — OFFICE VISIT (OUTPATIENT)
Dept: UROLOGY | Facility: CLINIC | Age: 62
End: 2017-11-22
Payer: MEDICARE

## 2017-11-22 VITALS
SYSTOLIC BLOOD PRESSURE: 101 MMHG | DIASTOLIC BLOOD PRESSURE: 57 MMHG | WEIGHT: 171 LBS | HEIGHT: 64 IN | HEART RATE: 58 BPM | BODY MASS INDEX: 29.19 KG/M2

## 2017-11-22 DIAGNOSIS — N31.9 NEUROGENIC BLADDER: Chronic | ICD-10-CM

## 2017-11-22 DIAGNOSIS — R33.9 INCOMPLETE BLADDER EMPTYING: ICD-10-CM

## 2017-11-22 DIAGNOSIS — Z43.5 ENCOUNTER FOR SUPRAPUBIC CATHETER CARE: ICD-10-CM

## 2017-11-22 DIAGNOSIS — N39.41 URGE INCONTINENCE OF URINE: Primary | ICD-10-CM

## 2017-11-22 PROCEDURE — 99499 UNLISTED E&M SERVICE: CPT | Mod: S$GLB,,, | Performed by: PHYSICIAN ASSISTANT

## 2017-11-22 PROCEDURE — 99213 OFFICE O/P EST LOW 20 MIN: CPT | Mod: 25,S$GLB,, | Performed by: PHYSICIAN ASSISTANT

## 2017-11-22 PROCEDURE — 99999 PR PBB SHADOW E&M-EST. PATIENT-LVL III: CPT | Mod: PBBFAC,,, | Performed by: PHYSICIAN ASSISTANT

## 2017-11-22 PROCEDURE — 51705 CHANGE OF BLADDER TUBE: CPT | Mod: S$GLB,,, | Performed by: PHYSICIAN ASSISTANT

## 2017-11-27 NOTE — PROGRESS NOTES
CHIEF COMPLAINT:    Mrs. Hawley is a 62 y.o. female presenting for meadows catheter problems.   PRESENTING ILLNESS:    Tasha Hawley is a 62 y.o. female with a PMH of incomplete bladder emptying, recurrent UTI who presents for meadows catheter problems.  She reports that her suprapubic catheter is not draining like it supposed to.  She is experiencing urinary incontinence and has to wear pads now.  She states that she has used over 30 pads within the last week.    She also reports a lot of sediment in her tube and seems to be clogging it.  Her  irrigates her catheter.    Her last catheter change was on 11/8/17.  She reports that her catheter was draining well after her catheter was changed.  She is taking methenamine with Vitamin C and oxybutynin daily.      REVIEW OF SYSTEMS:      Constitutional: Negative for fever and chills.   HENT: Negative for hearing loss.   Eyes: Negative for visual disturbance.   Respiratory: Negative for shortness of breath.   Cardiovascular: Negative for chest pain.   Gastrointestinal: Negative for vomiting, and constipation.   Genitourinary:  Positive for incontinence and incomplete bladder emptying.   Neurological: Negative for dizziness.   Hematological: Does not bruise/bleed easily.   Psychiatric/Behavioral: Negative for confusion.     PATIENT HISTORY:    Past Medical History:   Diagnosis Date    Anxiety     Arthritis     Bilateral lower extremity edema     severe chronic    Carotid artery occlusion     Cataract     Depression     Fever blister     Hypothyroid     Iron deficiency anemia     Lumbar radiculopathy     with chronic pain    Ocular migraine     Sleep apnea     cpap       Past Surgical History:   Procedure Laterality Date    BACK SURGERY      x 12    CATARACT EXTRACTION W/  INTRAOCULAR LENS IMPLANT Left     Dr Coleman     HYSTERECTOMY  1975    endometriosis    pain pump placement      SQ Dilaudid Pump managed by Dr. Hillman, Pain Management     SPINAL CORD STIMULATOR REMOVAL      before Larissa    SPINE SURGERY  5-13-13    CERVICAL FUSION       Family History   Problem Relation Age of Onset    Cancer Mother 55     breast    Cancer Father      esophagus,had laryngectomy    Esophageal cancer Father     Parkinsonism Maternal Grandmother     Tremor Maternal Grandmother     No Known Problems Brother     No Known Problems Brother     Heart disease Maternal Uncle     No Known Problems Sister     No Known Problems Maternal Aunt     No Known Problems Paternal Aunt     No Known Problems Paternal Uncle     No Known Problems Maternal Grandfather     No Known Problems Paternal Grandmother     No Known Problems Paternal Grandfather     Melanoma Neg Hx     Amblyopia Neg Hx     Blindness Neg Hx     Cataracts Neg Hx     Diabetes Neg Hx     Glaucoma Neg Hx     Hypertension Neg Hx     Macular degeneration Neg Hx     Retinal detachment Neg Hx     Strabismus Neg Hx     Stroke Neg Hx     Thyroid disease Neg Hx     Colon cancer Neg Hx        Social History     Social History    Marital status:      Spouse name: N/A    Number of children: N/A    Years of education: N/A     Occupational History    Not on file.     Social History Main Topics    Smoking status: Never Smoker    Smokeless tobacco: Never Used    Alcohol use No      Comment: denies    Drug use: No    Sexual activity: No     Other Topics Concern    Not on file     Social History Narrative    No narrative on file       Allergies:  (d)-limonene flavor; Bactrim [sulfamethoxazole-trimethoprim]; Imitrex [sumatriptan succinate]; Penicillins; Topamax [topiramate]; Vancomycin; Butorphanol tartrate; Darvocet a500 [propoxyphene n-acetaminophen]; Fentanyl; White petrolatum-zinc; Zinc oxide-white petrolatum; and Latex, natural rubber    Medications:    Current Outpatient Prescriptions:     ascorbic acid, vitamin C, (VITAMIN C) 500 mg Chew, Take 1 tablet by mouth 3 (three) times daily.,  Disp: 90 each, Rfl: 11    aspirin (ECOTRIN) 81 MG EC tablet, Take 1 tablet (81 mg total) by mouth once daily., Disp: , Rfl: 0    atorvastatin (LIPITOR) 80 MG tablet, Take 1 tablet (80 mg total) by mouth once daily. (Patient taking differently: Take 80 mg by mouth every evening. ), Disp: 90 tablet, Rfl: 3    BUTALBITAL/ASPIRIN/CAFFEINE (FIORINAL ORAL), Take by mouth as needed., Disp: , Rfl:     DOC-Q-LACE 100 mg capsule, TAKE ONE CAPSULE BY MOUTH THREE TIMES A DAY AS NEEDED FOR CONSTIPATION, Disp: 90 capsule, Rfl: 3    FLUoxetine (PROZAC) 20 MG capsule, Take 3 capsules (60 mg total) by mouth once daily., Disp: 90 capsule, Rfl: 2    levothyroxine (SYNTHROID) 112 MCG tablet, Take 1 tablet (112 mcg total) by mouth before breakfast., Disp: 90 tablet, Rfl: 3    methenamine (HIPREX) 1 gram Tab, Take 1 tablet (1 g total) by mouth 2 (two) times daily., Disp: 60 tablet, Rfl: 11    ondansetron (ZOFRAN) 8 MG tablet, Take 1 tablet (8 mg total) by mouth every 12 (twelve) hours as needed for Nausea., Disp: 60 tablet, Rfl: 3    oxybutynin (DITROPAN XL) 15 MG TR24, Take 1 tablet (15 mg total) by mouth once daily., Disp: 30 tablet, Rfl: 6    oxycodone-acetaminophen (PERCOCET)  mg per tablet, , Disp: , Rfl:     pantoprazole (PROTONIX) 40 MG tablet, TAKE ONE TABLET BY MOUTH EVERY DAY., Disp: 90 tablet, Rfl: 2    SENNOSIDES (SENNA LAX ORAL), Take by mouth as needed., Disp: , Rfl:     trazodone (DESYREL) 50 MG tablet, Take 3 tablets (150 mg total) by mouth every evening., Disp: 90 tablet, Rfl: 3    PHYSICAL EXAMINATION:    Constitutional: She appears well-developed and well-nourished.  She is in no apparent distress.    Eyes: No scleral icterus noted bilaterally.  No discharge noted bilaterally.      Cardiovascular: Normal rate.  No pitting edema noted in lower extremities bilaterally    Pulmonary/Chest: Effort normal. No respiratory distress.     Abdominal:  She exhibits no distension.  There is no CVA tenderness.      Lymphadenopathy:          Right: No supraclavicular adenopathy present.        Left: No supraclavicular adenopathy present.     Neurological: She is alert and oriented to person, place, and time.     Skin: Skin is warm and dry.     Psych: Cooperative with normal affect.    Yellow urine noted in bag.      Physical Exam      IMPRESSION:    Encounter Diagnoses   Name Primary?    Urge incontinence of urine Yes    Neurogenic bladder     Incomplete bladder emptying     Encounter for suprapubic catheter care        PLAN:   30cc of sterile water was removed to deflate the balloon.   20fr silicone catheter was removed and a new 20fr silicone catheter was placed.    180ml was used to irrigate the bladder.  2 small blood clots were noted.  Urine was clear.     Answered 's question concerning how to flush catheter.    Encourage plenty of fluid intake.    I informed patient that I discussed increasing sp catheter size with Dr. Mayo last month and she did not recommend going bigger.  Patient voiced understanding.           Follow up in 2 weeks.          Bryanna Sultana PA-C

## 2017-11-28 ENCOUNTER — OUTPATIENT CASE MANAGEMENT (OUTPATIENT)
Dept: ADMINISTRATIVE | Facility: OTHER | Age: 62
End: 2017-11-28

## 2017-11-28 NOTE — PROGRESS NOTES
Attempted to update plan of care for OPCM; left voice message with wendy Champion to return call.  Letter sent.  LULI Marshall OPCM-RN

## 2017-11-28 NOTE — LETTER
November 28, 2017    Tasha Hawley  [address]  [place] [address]             [facility] [address] [facility] [facility] [place] [address] Dear Tasha,    I work with [facility]'s Outpatient Case Management Department. I have been unsuccessful at reaching you to follow-up to see how you have been doing. If you require any future assistance or if any new concerns or problems arise, please do not hesitate to call.     The Outpatient Case Management Department can be reached at [phone] from 8:00AM to 4:30 PM on Monday thru Friday. [facility] also has a program where a nurse is available 24/7 to answer questions or provide medical advice, their number is [phone].    Thanks,      Fannie Marshall,  RN  Outpatient Case Management

## 2017-11-28 NOTE — PROGRESS NOTES
Chart reviewed.  Telephonic follow-up with patient's daughter, Lisa Thompson.  Lisa said patient has been doing better with depression.  She said her medication was changed back to Prozac.  She said patient has been compliant with medical/mental health appointments and medication.  Lisa said she is going to appeal patient's Mediciaid denial.  [facility] was discussed for possible consult on the appeal.  Lisa said she has not had time to make contact with any other resource agencies but she did get the [facility] booklet in the mail.  Lisa said she is coping adequately at this time.  LCSW will continue to monitor patient/caregiver needs and provide support as appropriate.    Interventions:  Per POC      Plan:    Ensure follow-up/contact with community resource programs and the [facility] for legal consult.  Encourage communication with providers as appropriate.  Encourage medical/mental health appointment compliance.  Encourage medication compliance.

## 2017-11-29 ENCOUNTER — TELEPHONE (OUTPATIENT)
Dept: UROLOGY | Facility: CLINIC | Age: 62
End: 2017-11-29

## 2017-11-29 NOTE — TELEPHONE ENCOUNTER
"Pt spoke to Bryanna Sultana PA-C. States her s/p tube is still not draining correctly. Having to wear three pads at one time, bladder emptying through urethera. Only getting about 200cc in s/p tube bag per day. States she "cannot live like this". Wants to discuss other alternatives. Appt made for 11/30 with Dr. Mayo to discuss.   "

## 2017-11-29 NOTE — TELEPHONE ENCOUNTER
----- Message from Kalina Cohn MA sent at 11/29/2017  8:34 AM CST -----  Contact: self  [phone]  States her super pubic catheter is leaking and she needs to speak to you about this problem.  States she is wearing pads and really doesn't want to wear pads.

## 2017-11-30 ENCOUNTER — OFFICE VISIT (OUTPATIENT)
Dept: INTERNAL MEDICINE | Facility: CLINIC | Age: 62
End: 2017-11-30
Payer: MEDICARE

## 2017-11-30 ENCOUNTER — DOCUMENTATION ONLY (OUTPATIENT)
Dept: INTERNAL MEDICINE | Facility: CLINIC | Age: 62
End: 2017-11-30

## 2017-11-30 ENCOUNTER — OFFICE VISIT (OUTPATIENT)
Dept: UROLOGY | Facility: CLINIC | Age: 62
End: 2017-11-30
Payer: MEDICARE

## 2017-11-30 ENCOUNTER — IMMUNIZATION (OUTPATIENT)
Dept: INTERNAL MEDICINE | Facility: CLINIC | Age: 62
End: 2017-11-30
Payer: MEDICARE

## 2017-11-30 ENCOUNTER — CLINICAL SUPPORT (OUTPATIENT)
Dept: CARDIOLOGY | Facility: CLINIC | Age: 62
End: 2017-11-30
Attending: NURSE PRACTITIONER
Payer: MEDICARE

## 2017-11-30 VITALS
DIASTOLIC BLOOD PRESSURE: 60 MMHG | HEIGHT: 64 IN | BODY MASS INDEX: 28.51 KG/M2 | SYSTOLIC BLOOD PRESSURE: 120 MMHG | OXYGEN SATURATION: 98 % | WEIGHT: 167 LBS | HEART RATE: 61 BPM

## 2017-11-30 VITALS — SYSTOLIC BLOOD PRESSURE: 91 MMHG | HEART RATE: 56 BPM | DIASTOLIC BLOOD PRESSURE: 50 MMHG

## 2017-11-30 DIAGNOSIS — F11.20 NARCOTIC DEPENDENCY, CONTINUOUS: Chronic | ICD-10-CM

## 2017-11-30 DIAGNOSIS — Z12.11 SCREEN FOR COLON CANCER: ICD-10-CM

## 2017-11-30 DIAGNOSIS — G47.00 INSOMNIA, UNSPECIFIED TYPE: ICD-10-CM

## 2017-11-30 DIAGNOSIS — G89.4 CHRONIC PAIN SYNDROME: Chronic | ICD-10-CM

## 2017-11-30 DIAGNOSIS — I89.0 LYMPHEDEMA OF BOTH LOWER EXTREMITIES: Chronic | ICD-10-CM

## 2017-11-30 DIAGNOSIS — Z23 NEEDS FLU SHOT: ICD-10-CM

## 2017-11-30 DIAGNOSIS — R33.9 URINARY RETENTION: Primary | ICD-10-CM

## 2017-11-30 DIAGNOSIS — R09.89 BRUIT OF RIGHT CAROTID ARTERY: ICD-10-CM

## 2017-11-30 DIAGNOSIS — R82.90 ABNORMAL URINE SEDIMENT: Primary | ICD-10-CM

## 2017-11-30 LAB — INTERNAL CAROTID STENOSIS: NORMAL

## 2017-11-30 PROCEDURE — 90686 IIV4 VACC NO PRSV 0.5 ML IM: CPT | Mod: S$GLB,,, | Performed by: INTERNAL MEDICINE

## 2017-11-30 PROCEDURE — 99214 OFFICE O/P EST MOD 30 MIN: CPT | Mod: S$GLB,,, | Performed by: UROLOGY

## 2017-11-30 PROCEDURE — 99499 UNLISTED E&M SERVICE: CPT | Mod: S$GLB,,, | Performed by: INTERNAL MEDICINE

## 2017-11-30 PROCEDURE — 93880 EXTRACRANIAL BILAT STUDY: CPT | Mod: S$GLB,,, | Performed by: INTERNAL MEDICINE

## 2017-11-30 PROCEDURE — 99999 PR PBB SHADOW E&M-EST. PATIENT-LVL III: CPT | Mod: PBBFAC,,, | Performed by: UROLOGY

## 2017-11-30 PROCEDURE — 99499 UNLISTED E&M SERVICE: CPT | Mod: S$GLB,,, | Performed by: UROLOGY

## 2017-11-30 PROCEDURE — 99999 PR PBB SHADOW E&M-EST. PATIENT-LVL III: CPT | Mod: PBBFAC,,, | Performed by: INTERNAL MEDICINE

## 2017-11-30 PROCEDURE — 99999 PR PBB SHADOW E&M-EST. PATIENT-LVL I: CPT | Mod: PBBFAC,,,

## 2017-11-30 PROCEDURE — G0008 ADMIN INFLUENZA VIRUS VAC: HCPCS | Mod: S$GLB,,, | Performed by: INTERNAL MEDICINE

## 2017-11-30 PROCEDURE — 99214 OFFICE O/P EST MOD 30 MIN: CPT | Mod: S$GLB,,, | Performed by: INTERNAL MEDICINE

## 2017-11-30 RX ORDER — POTASSIUM CITRATE 5 MEQ/1
5 TABLET, EXTENDED RELEASE ORAL
Qty: 90 TABLET | Refills: 11 | Status: SHIPPED | OUTPATIENT
Start: 2017-11-30 | End: 2018-06-08

## 2017-11-30 RX ORDER — HYDROCODONE BITARTRATE AND ACETAMINOPHEN 10; 325 MG/1; MG/1
2 TABLET ORAL EVERY 4 HOURS PRN
COMMUNITY
End: 2019-11-07 | Stop reason: SDUPTHER

## 2017-11-30 RX ORDER — HYDROXYZINE PAMOATE 25 MG/1
25 CAPSULE ORAL NIGHTLY PRN
Qty: 60 CAPSULE | Refills: 3 | Status: ON HOLD | OUTPATIENT
Start: 2017-11-30 | End: 2017-12-28 | Stop reason: CLARIF

## 2017-11-30 NOTE — PROGRESS NOTES
CHIEF COMPLAINT:    Mrs. Hawley is a 62 y.o. female presenting for an urgent appointment because she has incontinence from below with a suprapubic tube    PRESENTING ILLNESS:    Tasha Hawley is a 62 y.o. female who returns accompanied by her .  She states that she has had urge incontinence but it seems to occur when she is sitting up and the suprapubic tube does not seem to be draining well.  She is presently on methenamine hippurate twice a day, vitamin C three times a day to acidify the urine, and she is taking oxybutynin XL 15 mg.  She does not describe stress incontinence (this was demonstrated with a high ALPP on urodynamics in 2015.)  When questioned about the urge incontinence it only occurs when she feels like the tube is not draining well.   The catheter was just changed on 11/22/2017 by ALEXY Workman.    REVIEW OF SYSTEMS:    Review of Systems   Constitutional: Negative.    HENT: Negative.    Eyes: Negative.    Respiratory: Negative.    Cardiovascular: Positive for leg swelling.   Gastrointestinal: Negative.    Genitourinary: Positive for urgency.   Musculoskeletal: Positive for back pain, joint pain and myalgias.   Skin: Negative.    Neurological: Positive for tingling.   Endo/Heme/Allergies: Negative.    Psychiatric/Behavioral: The patient is nervous/anxious.      PATIENT HISTORY:    Past Medical History:   Diagnosis Date    Anxiety     Arthritis     Bilateral lower extremity edema     severe chronic    Carotid artery occlusion     Cataract     Depression     Fever blister     Hypothyroid     Iron deficiency anemia     Lumbar radiculopathy     with chronic pain    Ocular migraine     Sleep apnea     cpap       Past Surgical History:   Procedure Laterality Date    BACK SURGERY      x 12    CATARACT EXTRACTION W/  INTRAOCULAR LENS IMPLANT Left     Dr Coleman     HYSTERECTOMY  1975    endometriosis    pain pump placement      SQ Dilaudid Pump managed by Dr. Hillman,  Pain Management    SPINAL CORD STIMULATOR REMOVAL      before Larissa    SPINE SURGERY  5-13-13    CERVICAL FUSION       Family History   Problem Relation Age of Onset    Cancer Mother 55     breast    Cancer Father      esophagus,had laryngectomy    Esophageal cancer Father     Parkinsonism Maternal Grandmother     Tremor Maternal Grandmother     Heart disease Maternal Uncle      Social History    Marital status:      Social History Main Topics    Smoking status: Never Smoker    Smokeless tobacco: Never Used    Alcohol use No      Comment: denies    Drug use: No    Sexual activity: No       Allergies:  (d)-limonene flavor; Bactrim [sulfamethoxazole-trimethoprim]; Imitrex [sumatriptan succinate]; Penicillins; Topamax [topiramate]; Vancomycin; Butorphanol tartrate; Darvocet a500 [propoxyphene n-acetaminophen]; Fentanyl; White petrolatum-zinc; Zinc oxide-white petrolatum; and Latex, natural rubber    Medications:  Outpatient Encounter Prescriptions as of 11/30/2017   Medication Sig Dispense Refill    ascorbic acid, vitamin C, (VITAMIN C) 500 mg Chew Take 1 tablet by mouth 3 (three) times daily. 90 each 11    aspirin (ECOTRIN) 81 MG EC tablet Take 1 tablet (81 mg total) by mouth once daily.  0    atorvastatin (LIPITOR) 80 MG tablet Take 1 tablet (80 mg total) by mouth once daily. (Patient taking differently: Take 80 mg by mouth every evening. ) 90 tablet 3    BUTALBITAL/ASPIRIN/CAFFEINE (FIORINAL ORAL) Take by mouth as needed.      DOC-Q-LACE 100 mg capsule TAKE ONE CAPSULE BY MOUTH THREE TIMES A DAY AS NEEDED FOR CONSTIPATION 90 capsule 3    FLUoxetine (PROZAC) 20 MG capsule Take 3 capsules (60 mg total) by mouth once daily. 90 capsule 2    levothyroxine (SYNTHROID) 112 MCG tablet Take 1 tablet (112 mcg total) by mouth before breakfast. 90 tablet 3    methenamine (HIPREX) 1 gram Tab Take 1 tablet (1 g total) by mouth 2 (two) times daily. 60 tablet 11    ondansetron (ZOFRAN) 8 MG tablet  Take 1 tablet (8 mg total) by mouth every 12 (twelve) hours as needed for Nausea. 60 tablet 3    oxybutynin (DITROPAN XL) 15 MG TR24 Take 1 tablet (15 mg total) by mouth once daily. 30 tablet 6    pantoprazole (PROTONIX) 40 MG tablet TAKE ONE TABLET BY MOUTH EVERY DAY. 90 tablet 2    SENNOSIDES (SENNA LAX ORAL) Take by mouth as needed.      trazodone (DESYREL) 50 MG tablet Take 3 tablets (150 mg total) by mouth every evening. 90 tablet 3    [DISCONTINUED] oxycodone-acetaminophen (PERCOCET)  mg per tablet       potassium citrate (UROCIT-K) 5 mEq (540 mg) TbSR Take 1 tablet (5 mEq total) by mouth 3 (three) times daily with meals. 90 tablet 11    [DISCONTINUED] lamotrigine (LAMICTAL) 25 MG tablet Take 1 tablet (25 mg total) by mouth once daily. 30 tablet 6     No facility-administered encounter medications on file as of 11/30/2017.          PHYSICAL EXAMINATION:    The patient generally appears in good health, is appropriately interactive, and is in no apparent distress.    Skin: No lesions.    Mental: Cooperative with normal affect.    Neuro: Grossly intact.    HEENT: Normal. No evidence of lymphadenopathy.    Chest:  normal inspiratory effort.    Abdomen:  Soft, non-tender. No masses or organomegaly. Bladder is not palpable. No evidence of flank discomfort. No evidence of inguinal hernia.    Extremities: No clubbing, cyanosis, or edema      LABS:    Lab Results   Component Value Date    BUN 8 11/13/2017    CREATININE 0.8 11/13/2017       IMPRESSION:    Encounter Diagnoses   Name Primary?    Abnormal urine sediment Yes       PLAN:    1.  Will add Urocit K bid to her regimen to decrease the sediment  2.  Discussed keeping the suprapubic tube so it does not kink to allow normal drainage.  Also keeping the urine collection bag low to allow proper drainage  3.  She does not wish to have a urethral catheter placed, due to discomfort  4.  Discussed a urethral bulking agent but if the incontinence occurs when  the catheter is not draining, then she needs to trouble shoot the catheter to make sure it is able to drain properly.  She and her  expressed understanding.

## 2017-11-30 NOTE — PROGRESS NOTES
Subjective:       Patient ID: Tasha Hawley is a 62 y.o. female.    Chief Complaint: Follow-up (3 months f/u)    HPI - Ms Hawley continues to have several complaints.  Her back pain is insufficiently controlled, and she continues to see a pain management physician outside of [facility].  She has a suprapubic catheter and is complaining of urinary leakage (she says through her vagina, but urology notes say urethral).  Urinary leakage occurs randomly, but seems to be improved when lying down.  Worse when bag is elevated.      Chronic LE edema is painful.  She's not using compression stockings/wraps or her compression boots right now.  Legs become more painful when using boots/wraps - pain described as burning/tingling.  I think this is d/t chronic back pain with sciatica, but not clear.  Constipation is improved.  She declines mammogram and colonoscopy, but will do FIT testing for colorectal CA screening.  Sleep remains an issue, and she sometimes spends all night awake.  Ruminating thoughts and positional pain interfere most with sleep - she is up and down out of the bed all night.  She agreed to take the flu shot today.  She doesn't smoke.    PMH:  Chronic insomnia.  Chronic low back pain with narcotic use.  Indwelling narcotic pump  History CVA with dysarthria  Neurogenic bladder, with recurrent UTI's.  SP catheter placed Nov 2016  Hypothyroidism, stable  CECI, not on CPAP  CAD, with ACS in Jan 2016 - subtot mid LAD lesion without PCI; ischemic CM with EF 40% and chronic LE edema. Followed by [facility] cardiology  Anxiety and Depression  Chronic constipation, likely d/t ongoing narcotic use  Intermittent nausea  Chronic LE edema     Meds: Reviewed and reconciled in EPIC with patient during visit today.    Review of Systems   Constitutional: Positive for fatigue.   HENT: Negative for congestion.    Respiratory: Negative for shortness of breath.    Cardiovascular: Positive for leg swelling. Negative for chest  pain.   Gastrointestinal: Positive for constipation.   Genitourinary: Positive for difficulty urinating and urgency.   Musculoskeletal: Positive for back pain.   Skin: Negative for rash.   Neurological: Negative for headaches.   Psychiatric/Behavioral: Positive for sleep disturbance.       Objective:      Physical Exam   Constitutional: She is oriented to person, place, and time. She appears well-developed and well-nourished.   Chronically ill appearing woman examined in wheelchair d/t disability.     HENT:   Head: Normocephalic and atraumatic.   Cardiovascular: Normal rate, regular rhythm and normal heart sounds.  Exam reveals no gallop and no friction rub.    No murmur heard.  Pulmonary/Chest: Effort normal and breath sounds normal. No respiratory distress. She has no wheezes. She has no rales. She exhibits no tenderness.   Musculoskeletal: She exhibits edema.   Neurological: She is alert and oriented to person, place, and time.   Skin: Skin is warm and dry. No erythema.   Psychiatric: She has a normal mood and affect.   Nursing note and vitals reviewed.      Assessment:       1. Urinary retention    2. Lymphedema of both lower extremities    3. Insomnia, unspecified type    4. Chronic pain syndrome    5. Narcotic dependency, continuous    6. Screen for colon cancer    7. Needs flu shot        Plan:       Tasha was seen today for follow-up.    Diagnoses and all orders for this visit:    Urinary retention - encouraged her to bring up these issues with her urologist again.  If she truly has leakage through vagina (doubtful), there may be a fistula.  I sent her urologist a message    Lymphedema of both lower extremities - encouraged her to use the compression boots, wraps, and elevation.    Insomnia, unspecified type - chronic problem, worsening.  She's taking 150 of trazodone now.  When I first met her, she was severely overmedicated for this, and don't want to do that again.  Will introduce low dose of vistaril in  an attempt to control ruminating thoughts and assist with sleep  -     hydrOXYzine pamoate (VISTARIL) 25 MG Cap; Take 1 capsule (25 mg total) by mouth nightly as needed (insomnia).    Chronic pain syndrome - stable, actually a bit improved.  Stay the course    Narcotic dependency, continuous    Screen for colon cancer  -     Fecal Immunochemical Test (iFOBT); Future    Needs flu shot    rtc prn, or 3 months    G Sriram Hodges MD MPH  Staff Internist

## 2017-12-01 ENCOUNTER — TELEPHONE (OUTPATIENT)
Dept: CARDIOLOGY | Facility: CLINIC | Age: 62
End: 2017-12-01

## 2017-12-01 DIAGNOSIS — I77.9 BILATERAL CAROTID ARTERY DISEASE: Primary | ICD-10-CM

## 2017-12-01 NOTE — TELEPHONE ENCOUNTER
Spoke with MsSadiq Chandan regarding her carotid ultrasound results.  She has mild carotid disease (20-39%) and is on appropriate therapy to treat it (high intensity statin therapy and low dose ASA).  Explained that this is plaque not a blockage.  No additional testing or intervention is needed at this time.

## 2017-12-04 RX ORDER — ATORVASTATIN CALCIUM 80 MG/1
TABLET, FILM COATED ORAL
Qty: 90 TABLET | Refills: 3 | Status: SHIPPED | OUTPATIENT
Start: 2017-12-04 | End: 2018-12-10 | Stop reason: SDUPTHER

## 2017-12-05 ENCOUNTER — OUTPATIENT CASE MANAGEMENT (OUTPATIENT)
Dept: ADMINISTRATIVE | Facility: OTHER | Age: 62
End: 2017-12-05

## 2017-12-05 ENCOUNTER — TELEPHONE (OUTPATIENT)
Dept: INTERNAL MEDICINE | Facility: CLINIC | Age: 62
End: 2017-12-05

## 2017-12-05 RX ORDER — SERTRALINE HYDROCHLORIDE 100 MG/1
TABLET, FILM COATED ORAL
Qty: 30 TABLET | Refills: 0 | OUTPATIENT
Start: 2017-12-05

## 2017-12-05 NOTE — TELEPHONE ENCOUNTER
Call her back.  She doesn't need an appointment for this b/c we just talked about it during her last appointment. This is chronic and longstanding.  It is extremely difficult for her to travel to and from appointments b/c of mobility disturbance.    Tell her I will call later today.    D

## 2017-12-05 NOTE — TELEPHONE ENCOUNTER
----- Message from Aviva Anthony sent at 12/5/2017  8:43 AM CST -----  Contact: pt [phone]  Pt would like a call from the nurse pt said she have not slept in three days,please advise pt

## 2017-12-05 NOTE — PROGRESS NOTES
Talked to daughter Lisa Thompson at [phone] to attempt to update plan of care for OPCM.  Lisa stated that she was unable to talk at this time.  Asked for this RN to call back at a later date and time.  LULI Marshall, OPCM-RN

## 2017-12-06 ENCOUNTER — LAB VISIT (OUTPATIENT)
Dept: LAB | Facility: HOSPITAL | Age: 62
End: 2017-12-06
Attending: INTERNAL MEDICINE
Payer: MEDICARE

## 2017-12-06 ENCOUNTER — TELEPHONE (OUTPATIENT)
Dept: INTERNAL MEDICINE | Facility: CLINIC | Age: 62
End: 2017-12-06

## 2017-12-06 DIAGNOSIS — Z12.11 SCREEN FOR COLON CANCER: ICD-10-CM

## 2017-12-06 LAB — HEMOCCULT STL QL IA: NEGATIVE

## 2017-12-06 PROCEDURE — 82274 ASSAY TEST FOR BLOOD FECAL: CPT

## 2017-12-06 NOTE — TELEPHONE ENCOUNTER
----- Message from Abdon Madrigal sent at 12/6/2017  9:25 AM CST -----  Contact: self/ [phone] cell  Pt is at the main campus and would like the call from Dr. Hodges to come to that number.  Please call and advise.    Thank you

## 2017-12-07 ENCOUNTER — TELEPHONE (OUTPATIENT)
Dept: INTERNAL MEDICINE | Facility: CLINIC | Age: 62
End: 2017-12-07

## 2017-12-07 NOTE — TELEPHONE ENCOUNTER
I spoke to her at length about her insomnia. She had a paradoxical reaction to vistaril, which kept her awake. She's having some shaking and twitching today that concerns her.  I recommended urgent care if this was bothering her, but she'd prefer to see me tomorrow.    Will set her up for an appt.    D

## 2017-12-07 NOTE — TELEPHONE ENCOUNTER
----- Message from Abdon Madrigal sent at 12/7/2017  7:22 AM CST -----  Contact: self/ [phone] home  Type: Sooner appointment than  is able to schedule    When is the first available appointment? Unknown    What is the nature of the appointment? Withdrawal symptoms/ shakes    What appointment type: urgent    Comments: pt states that she has been waiting on a call back from Dr. Hodges to discuss some symptoms she is having.  She now wants access to an appt today with him. She states that she is having the shakes, like she is having withdrawal symptoms but she doesn't know why.  She would like a call back to discuss.  Please call and advise.    Thank you

## 2017-12-08 ENCOUNTER — OFFICE VISIT (OUTPATIENT)
Dept: INTERNAL MEDICINE | Facility: CLINIC | Age: 62
End: 2017-12-08
Payer: MEDICARE

## 2017-12-08 ENCOUNTER — LAB VISIT (OUTPATIENT)
Dept: LAB | Facility: HOSPITAL | Age: 62
End: 2017-12-08
Attending: INTERNAL MEDICINE
Payer: MEDICARE

## 2017-12-08 VITALS
HEART RATE: 58 BPM | WEIGHT: 165 LBS | BODY MASS INDEX: 28.17 KG/M2 | SYSTOLIC BLOOD PRESSURE: 140 MMHG | HEIGHT: 64 IN | DIASTOLIC BLOOD PRESSURE: 70 MMHG

## 2017-12-08 DIAGNOSIS — R25.1 TREMOR: ICD-10-CM

## 2017-12-08 DIAGNOSIS — F41.9 ANXIETY: ICD-10-CM

## 2017-12-08 DIAGNOSIS — G47.09 OTHER INSOMNIA: Primary | ICD-10-CM

## 2017-12-08 DIAGNOSIS — G89.4 CHRONIC PAIN SYNDROME: Chronic | ICD-10-CM

## 2017-12-08 LAB
ALBUMIN SERPL BCP-MCNC: 3.6 G/DL
ALP SERPL-CCNC: 119 U/L
ALT SERPL W/O P-5'-P-CCNC: 9 U/L
ANION GAP SERPL CALC-SCNC: 7 MMOL/L
AST SERPL-CCNC: 10 U/L
BASOPHILS # BLD AUTO: 0.01 K/UL
BASOPHILS NFR BLD: 0.2 %
BILIRUB SERPL-MCNC: 0.2 MG/DL
BUN SERPL-MCNC: 10 MG/DL
CALCIUM SERPL-MCNC: 9 MG/DL
CHLORIDE SERPL-SCNC: 102 MMOL/L
CO2 SERPL-SCNC: 30 MMOL/L
CREAT SERPL-MCNC: 0.7 MG/DL
DIFFERENTIAL METHOD: ABNORMAL
EOSINOPHIL # BLD AUTO: 0.2 K/UL
EOSINOPHIL NFR BLD: 4.1 %
ERYTHROCYTE [DISTWIDTH] IN BLOOD BY AUTOMATED COUNT: 13.9 %
EST. GFR  (AFRICAN AMERICAN): >60 ML/MIN/1.73 M^2
EST. GFR  (NON AFRICAN AMERICAN): >60 ML/MIN/1.73 M^2
GLUCOSE SERPL-MCNC: 110 MG/DL
HCT VFR BLD AUTO: 31 %
HGB BLD-MCNC: 9.9 G/DL
LYMPHOCYTES # BLD AUTO: 1.3 K/UL
LYMPHOCYTES NFR BLD: 28.5 %
MAGNESIUM SERPL-MCNC: 2.3 MG/DL
MCH RBC QN AUTO: 28 PG
MCHC RBC AUTO-ENTMCNC: 31.9 G/DL
MCV RBC AUTO: 88 FL
MONOCYTES # BLD AUTO: 0.3 K/UL
MONOCYTES NFR BLD: 7.4 %
NEUTROPHILS # BLD AUTO: 2.7 K/UL
NEUTROPHILS NFR BLD: 59.6 %
NRBC BLD-RTO: 0 /100 WBC
PLATELET # BLD AUTO: 181 K/UL
PMV BLD AUTO: 10.1 FL
POTASSIUM SERPL-SCNC: 4.3 MMOL/L
PROT SERPL-MCNC: 7.1 G/DL
RBC # BLD AUTO: 3.54 M/UL
SODIUM SERPL-SCNC: 139 MMOL/L
WBC # BLD AUTO: 4.6 K/UL

## 2017-12-08 PROCEDURE — 83735 ASSAY OF MAGNESIUM: CPT

## 2017-12-08 PROCEDURE — 99215 OFFICE O/P EST HI 40 MIN: CPT | Mod: S$GLB,,, | Performed by: INTERNAL MEDICINE

## 2017-12-08 PROCEDURE — 85025 COMPLETE CBC W/AUTO DIFF WBC: CPT

## 2017-12-08 PROCEDURE — 80053 COMPREHEN METABOLIC PANEL: CPT

## 2017-12-08 PROCEDURE — 99999 PR PBB SHADOW E&M-EST. PATIENT-LVL III: CPT | Mod: PBBFAC,,, | Performed by: INTERNAL MEDICINE

## 2017-12-08 PROCEDURE — 36415 COLL VENOUS BLD VENIPUNCTURE: CPT

## 2017-12-08 NOTE — PROGRESS NOTES
"Subjective:       Patient ID: Tasha Hawley is a 62 y.o. female.    Chief Complaint: Anxiety (no sleep)    HPI - Ms Hawley came back early b/c her insomnia has worsened. This has been a chronic, years-long problem.  She has anxiety and her thoughts race at bedtime and awaken her from sleep.  She also feels anxious about missing things during the holidays and won't nap during the day.  I added melatonin to her longstanding trazodone regimen yesterday, and last night she slept for 4 hours.  She also had "twitching" and "spasms" during the afternoon yesterday.  She and  state that these go away when she lies down and goes to sleep. Again, these are chronic and longstanding.  Thought to be idiopathic/supratentorial.  She does not smoke cigarettes    PMH:  Chronic insomnia.  Chronic low back pain with narcotic use.  Indwelling narcotic pump  History CVA with dysarthria  Neurogenic bladder, with recurrent UTI's.  SP catheter placed Nov 2016  Hypothyroidism, stable  CECI, not on CPAP  CAD, with ACS in Jan 2016 - subtot mid LAD lesion without PCI; ischemic CM with EF 40% and chronic LE edema. Followed by [facility]  Anxiety and Depression  Chronic constipation, likely d/t ongoing narcotic use  Intermittent nausea  Chronic LE edema     Meds: Reviewed and reconciled in EPIC with patient during visit today.    Review of Systems   Constitutional: Negative for fever.   HENT: Negative for congestion.    Respiratory: Negative for shortness of breath.    Cardiovascular: Negative for chest pain.   Gastrointestinal: Negative for abdominal pain.   Genitourinary: Positive for difficulty urinating.   Musculoskeletal: Positive for arthralgias, back pain and gait problem.   Skin: Negative for rash.   Neurological: Positive for tremors and weakness.   Psychiatric/Behavioral: Positive for sleep disturbance.       Objective:      Physical Exam   Constitutional: She appears well-developed and well-nourished. No " distress.   HENT:   Head: Normocephalic and atraumatic.   Neurological: She is alert.   Dysarthric.  Past pointing on finger to nose.  Diffuse weakness   Skin: She is not diaphoretic.   Psychiatric:   anxious       Assessment:       1. Other insomnia    2. Tremor    3. Chronic pain syndrome    4. Anxiety        Plan:       Tasha was seen today for anxiety.    Diagnoses and all orders for this visit:    Other insomnia - chronic problem.  Offered seroquel, but she wants to stay with trazodone/melatonin for a week before switching to seroquel.  She will email me    Tremor - new complaint.  Unclear etiology.  May reflect global weakness.  Will do broad screening labs and ask neurology to comment  -     Ambulatory consult to Neurology  -     CBC auto differential; Future  -     Comprehensive metabolic panel; Future  -     Magnesium; Future    Chronic pain syndrome - stable on treatment    Anxiety - unstable, but on treatment.  Drives many of her complaints    rtc vidhi Hodges MD MPH  Staff Internist

## 2017-12-12 ENCOUNTER — OFFICE VISIT (OUTPATIENT)
Dept: UROLOGY | Facility: CLINIC | Age: 62
End: 2017-12-12
Payer: MEDICARE

## 2017-12-12 VITALS — HEART RATE: 64 BPM | DIASTOLIC BLOOD PRESSURE: 59 MMHG | SYSTOLIC BLOOD PRESSURE: 109 MMHG

## 2017-12-12 DIAGNOSIS — R32 URINARY INCONTINENCE, UNSPECIFIED TYPE: ICD-10-CM

## 2017-12-12 DIAGNOSIS — N31.9 NEUROGENIC BLADDER: ICD-10-CM

## 2017-12-12 DIAGNOSIS — Z43.5 ENCOUNTER FOR SUPRAPUBIC CATHETER CARE: Primary | ICD-10-CM

## 2017-12-12 PROCEDURE — 99499 UNLISTED E&M SERVICE: CPT | Mod: S$GLB,,, | Performed by: PHYSICIAN ASSISTANT

## 2017-12-12 PROCEDURE — 51705 CHANGE OF BLADDER TUBE: CPT | Mod: S$GLB,,, | Performed by: PHYSICIAN ASSISTANT

## 2017-12-12 PROCEDURE — 99999 PR PBB SHADOW E&M-EST. PATIENT-LVL III: CPT | Mod: PBBFAC,,, | Performed by: PHYSICIAN ASSISTANT

## 2017-12-12 PROCEDURE — 99213 OFFICE O/P EST LOW 20 MIN: CPT | Mod: 25,S$GLB,, | Performed by: PHYSICIAN ASSISTANT

## 2017-12-12 NOTE — PROGRESS NOTES
CHIEF COMPLAINT:    Mrs. Hawley is a 62 y.o. female presenting for catheter not draining.    PRESENTING ILLNESS:    Tasha Hawley is a 62 y.o. female with a PMH of urinary incontinence, neurogenic bladder who presents for catheter not draining.  She states that she has not had to empty her bag since 3am yesterday morning.  She reports that her bag has been on the floor so it can drain to gravity.  Normally her catheter drains well at night when she is in bed but lately it has not.  She tries to manipulate the catheter and flush it to see if that helps the drainage.  It has not helped.  She states that her catheter drained better before she started using clear silicone catheters.    She also reports increase leg swelling bilaterally.  This is followed by cardiology.      She reports urinary incontinence and is having to wear 3 pads at a time.    Her last [place] catheter change was 11/22/17.    She was seen on 11/30/17 and was started on urocit k to reduce urine sediment.    She also reports insomnia and is seeing internal medicine for.      REVIEW OF SYSTEMS:      Constitutional: Negative for fever and chills.   HENT: Negative for hearing loss.   Eyes: Negative for visual disturbance.   Respiratory: Negative for shortness of breath.   Cardiovascular: Negative for chest pain.   Gastrointestinal: Negative for nausea, vomiting, and constipation.   Genitourinary:  Positive for incontinence   Neurological: Negative for dizziness.   Hematological: Does not bruise/bleed easily.   Psychiatric/Behavioral: Negative for confusion.     PATIENT HISTORY:    Past Medical History:   Diagnosis Date    Anxiety     Arthritis     Bilateral lower extremity edema     severe chronic    Carotid artery occlusion     Cataract     Depression     Fever blister     Hypothyroid     Iron deficiency anemia     Lumbar radiculopathy     with chronic pain    Ocular migraine     Sleep apnea     cpap       Past Surgical History:    Procedure Laterality Date    BACK SURGERY      x 12    CATARACT EXTRACTION W/  INTRAOCULAR LENS IMPLANT Left     Dr Coleman     HYSTERECTOMY  1975    endometriosis    pain pump placement      SQ Dilaudid Pump managed by Dr. Hillman, Pain Management    SPINAL CORD STIMULATOR REMOVAL      before Larissa    SPINE SURGERY  5-13-13    CERVICAL FUSION       Family History   Problem Relation Age of Onset    Cancer Mother 55     breast    Cancer Father      esophagus,had laryngectomy    Esophageal cancer Father     Parkinsonism Maternal Grandmother     Tremor Maternal Grandmother     No Known Problems Brother     No Known Problems Brother     Heart disease Maternal Uncle     No Known Problems Sister     No Known Problems Maternal Aunt     No Known Problems Paternal Aunt     No Known Problems Paternal Uncle     No Known Problems Maternal Grandfather     No Known Problems Paternal Grandmother     No Known Problems Paternal Grandfather     Melanoma Neg Hx     Amblyopia Neg Hx     Blindness Neg Hx     Cataracts Neg Hx     Diabetes Neg Hx     Glaucoma Neg Hx     Hypertension Neg Hx     Macular degeneration Neg Hx     Retinal detachment Neg Hx     Strabismus Neg Hx     Stroke Neg Hx     Thyroid disease Neg Hx     Colon cancer Neg Hx        Social History     Social History    Marital status:      Spouse name: N/A    Number of children: N/A    Years of education: N/A     Occupational History    Not on file.     Social History Main Topics    Smoking status: Never Smoker    Smokeless tobacco: Never Used    Alcohol use No      Comment: denies    Drug use: No    Sexual activity: No     Other Topics Concern    Not on file     Social History Narrative    No narrative on file       Allergies:  (d)-limonene flavor; Bactrim [sulfamethoxazole-trimethoprim]; Imitrex [sumatriptan succinate]; Penicillins; Topamax [topiramate]; Vancomycin; Butorphanol tartrate; Darvocet a500 [propoxyphene  n-acetaminophen]; Fentanyl; White petrolatum-zinc; Zinc oxide-white petrolatum; and Latex, natural rubber    Medications:    Current Outpatient Prescriptions:     ascorbic acid, vitamin C, (VITAMIN C) 500 mg Chew, Take 1 tablet by mouth 3 (three) times daily., Disp: 90 each, Rfl: 11    aspirin (ECOTRIN) 81 MG EC tablet, Take 1 tablet (81 mg total) by mouth once daily., Disp: , Rfl: 0    atorvastatin (LIPITOR) 80 MG tablet, TAKE ONE TABLET BY MOUTH EVERY DAY., Disp: 90 tablet, Rfl: 3    BUTALBITAL/ASPIRIN/CAFFEINE (FIORINAL ORAL), Take by mouth as needed., Disp: , Rfl:     DOC-Q-LACE 100 mg capsule, TAKE ONE CAPSULE BY MOUTH THREE TIMES A DAY AS NEEDED FOR CONSTIPATION, Disp: 90 capsule, Rfl: 3    FLUoxetine (PROZAC) 20 MG capsule, Take 3 capsules (60 mg total) by mouth once daily., Disp: 90 capsule, Rfl: 2    hydrocodone-acetaminophen 10-325mg (NORCO)  mg Tab, Take by mouth., Disp: , Rfl:     hydrOXYzine pamoate (VISTARIL) 25 MG Cap, Take 1 capsule (25 mg total) by mouth nightly as needed (insomnia)., Disp: 60 capsule, Rfl: 3    levothyroxine (SYNTHROID) 112 MCG tablet, Take 1 tablet (112 mcg total) by mouth before breakfast., Disp: 90 tablet, Rfl: 3    methenamine (HIPREX) 1 gram Tab, Take 1 tablet (1 g total) by mouth 2 (two) times daily., Disp: 60 tablet, Rfl: 11    ondansetron (ZOFRAN) 8 MG tablet, Take 1 tablet (8 mg total) by mouth every 12 (twelve) hours as needed for Nausea., Disp: 60 tablet, Rfl: 3    oxybutynin (DITROPAN XL) 15 MG TR24, Take 1 tablet (15 mg total) by mouth once daily., Disp: 30 tablet, Rfl: 6    pantoprazole (PROTONIX) 40 MG tablet, TAKE ONE TABLET BY MOUTH EVERY DAY., Disp: 90 tablet, Rfl: 2    potassium citrate (UROCIT-K) 5 mEq (540 mg) TbSR, Take 1 tablet (5 mEq total) by mouth 3 (three) times daily with meals., Disp: 90 tablet, Rfl: 11    SENNOSIDES (SENNA LAX ORAL), Take by mouth as needed., Disp: , Rfl:     trazodone (DESYREL) 50 MG tablet, Take 3 tablets (150  mg total) by mouth every evening., Disp: 90 tablet, Rfl: 3    PHYSICAL EXAMINATION:    Constitutional: She appears well-developed and well-nourished.  She is in no apparent distress.    Eyes: No scleral icterus noted bilaterally.     Cardiovascular: Normal rate.  Pitting edema noted in lower extremities bilaterally    Pulmonary/Chest: Effort normal. No respiratory distress.     Abdominal:  She exhibits no distension.  There is no CVA tenderness.     Lymphadenopathy:          Right: No supraclavicular adenopathy present.        Left: No supraclavicular adenopathy present.     Neurological: She is alert and oriented to person, place, and time.     Skin: Skin is warm and dry.     Psych: Cooperative with normal affect.    Physical Exam   Abdominal:           200cc of Dark clear urine noted in bag.  No sediment noted.      LABS:      IMPRESSION:    Encounter Diagnoses   Name Primary?    Encounter for suprapubic catheter care Yes    Urinary incontinence, unspecified type     Neurogenic bladder        PLAN:  Patient states that the clear silicone catheter does not drain as well.  We will try a silver coated silicone catheter.      30cc of sterile water was removed to deflate the balloon.   20fr silicone catheter was removed and a new 20fr silver coated silicone catheter was placed.    60ml was used to irrigate the bladder.  Leg bag was connected to catheter and catheter was secured to bag.  250cc was noted draining from bladder.       Discussed keeping the suprapubic tube so it does not kink to allow normal drainage.  Also keeping the urine collection bag low to allow proper drainage.    Answered questions concerning bulking agent.  She is interested in bulking agent.  We will see if her urine drains better with the silver coated silicone catheter.  If incontinence persist then she will like to discuss bulking agent with Dr. Mayo.      Follow up in 1 month for s/p catheter change.      Bryanna Sultana PA-C

## 2017-12-18 ENCOUNTER — OUTPATIENT CASE MANAGEMENT (OUTPATIENT)
Dept: ADMINISTRATIVE | Facility: OTHER | Age: 62
End: 2017-12-18

## 2017-12-18 NOTE — PROGRESS NOTES
Attempted to update plan of care for OPCM; left voice message to return call for Lisa Thompson.  Case assigned to this RN. Chart reviewed. Will assist with facilitation and coordination of care with DAVID Joseph OPCM-GERSON. Will change care to monitoring.  Laurent, OPCM- RN

## 2017-12-21 ENCOUNTER — OUTPATIENT CASE MANAGEMENT (OUTPATIENT)
Dept: ADMINISTRATIVE | Facility: OTHER | Age: 62
End: 2017-12-21

## 2017-12-21 ENCOUNTER — TELEPHONE (OUTPATIENT)
Dept: INTERNAL MEDICINE | Facility: CLINIC | Age: 62
End: 2017-12-21

## 2017-12-21 NOTE — PROGRESS NOTES
Attempt to follow-up telelphonically with patient's daughter, Lisa Thompson. Lisa said she was in the hospital with her Dad and and requested a call back after the holidays.  LCSW offered her support. LCSW will reach out again when she returns from her holiday vacation.      Plan:  Ensure follow-up/contact with community resource programs and the [facility] for legal consult.  Encourage communication with providers as appropriate.  Encourage medical/mental health appointment compliance.  Encourage medication compliance.

## 2017-12-21 NOTE — TELEPHONE ENCOUNTER
----- Message from Adriana Fishman sent at 12/21/2017  2:02 PM CST -----  Contact: patient- [phone]  Patient would like to ask a question about a referral for a kidney specialist.

## 2017-12-26 ENCOUNTER — LAB VISIT (OUTPATIENT)
Dept: LAB | Facility: HOSPITAL | Age: 62
End: 2017-12-26
Payer: MEDICARE

## 2017-12-26 ENCOUNTER — TELEPHONE (OUTPATIENT)
Dept: UROLOGY | Facility: CLINIC | Age: 62
End: 2017-12-26

## 2017-12-26 DIAGNOSIS — N39.0 BACTERIAL UTI: Primary | ICD-10-CM

## 2017-12-26 DIAGNOSIS — A49.9 BACTERIAL UTI: Primary | ICD-10-CM

## 2017-12-26 DIAGNOSIS — A49.9 BACTERIAL UTI: ICD-10-CM

## 2017-12-26 DIAGNOSIS — N39.0 BACTERIAL UTI: ICD-10-CM

## 2017-12-26 PROCEDURE — 87086 URINE CULTURE/COLONY COUNT: CPT

## 2017-12-26 PROCEDURE — 87186 SC STD MICRODIL/AGAR DIL: CPT

## 2017-12-26 PROCEDURE — 87077 CULTURE AEROBIC IDENTIFY: CPT

## 2017-12-26 PROCEDURE — 87088 URINE BACTERIA CULTURE: CPT

## 2017-12-26 RX ORDER — NITROFURANTOIN 25; 75 MG/1; MG/1
100 CAPSULE ORAL 2 TIMES DAILY
Qty: 14 CAPSULE | Refills: 0 | Status: SHIPPED | OUTPATIENT
Start: 2017-12-26 | End: 2018-01-02

## 2017-12-26 NOTE — TELEPHONE ENCOUNTER
"Spoke with pt. Pt c/o burning with urination, dark colored urine, requesting antibiotic to be called in to pharmacy. Pt advised need appt with GERALD to confirm, need specimen. Pt refused and stated, " I don't need to come in for a visit.  always calls medication in for me." pt also sees Jani TRACEY. Informed pt I would send message to Bryanna TRACEY. Pt verbalized understanding.  "

## 2017-12-26 NOTE — PROGRESS NOTES
Returned patient's call.  She was advised to drop off an urine specimen to the lab.  Her  will come by and  a cup. She was instructed to collect the urine specimen prior to starting abx.  She denies reaction to macrobid.  macrobid sent to pharmcay.  She denies fevers but reports chills.

## 2017-12-26 NOTE — TELEPHONE ENCOUNTER
----- Message from Waldo Winters sent at 12/26/2017  8:28 AM CST -----  Contact: Pt:[phone]  Pt called and she would like to speak with 's nurse in regards to a severe uti.

## 2017-12-28 ENCOUNTER — TELEPHONE (OUTPATIENT)
Dept: GASTROENTEROLOGY | Facility: CLINIC | Age: 62
End: 2017-12-28

## 2017-12-28 ENCOUNTER — HOSPITAL ENCOUNTER (OUTPATIENT)
Facility: HOSPITAL | Age: 62
Discharge: HOME HEALTH | End: 2017-12-29
Attending: EMERGENCY MEDICINE | Admitting: HOSPITALIST
Payer: MEDICARE

## 2017-12-28 DIAGNOSIS — N39.0 URINARY TRACT INFECTION ASSOCIATED WITH INDWELLING URETHRAL CATHETER, INITIAL ENCOUNTER: ICD-10-CM

## 2017-12-28 DIAGNOSIS — I89.0 LYMPHEDEMA OF BOTH LOWER EXTREMITIES: ICD-10-CM

## 2017-12-28 DIAGNOSIS — R60.0 PERIPHERAL EDEMA: Primary | ICD-10-CM

## 2017-12-28 DIAGNOSIS — T83.511A URINARY TRACT INFECTION ASSOCIATED WITH INDWELLING URETHRAL CATHETER, INITIAL ENCOUNTER: ICD-10-CM

## 2017-12-28 DIAGNOSIS — N31.9 NEUROGENIC BLADDER: Chronic | ICD-10-CM

## 2017-12-28 PROBLEM — M41.9 SCOLIOSIS DEFORMITY OF SPINE: Chronic | Status: ACTIVE | Noted: 2017-03-31

## 2017-12-28 PROBLEM — Z96.89 S/P INSERTION OF SPINAL CORD STIMULATOR: Chronic | Status: ACTIVE | Noted: 2017-03-31

## 2017-12-28 PROBLEM — G47.09 OTHER INSOMNIA: Chronic | Status: ACTIVE | Noted: 2017-12-08

## 2017-12-28 LAB
ALBUMIN SERPL BCP-MCNC: 3.6 G/DL
ALP SERPL-CCNC: 104 U/L
ALT SERPL W/O P-5'-P-CCNC: 12 U/L
ANION GAP SERPL CALC-SCNC: 6 MMOL/L
AST SERPL-CCNC: 14 U/L
BACTERIA #/AREA URNS HPF: ABNORMAL /HPF
BACTERIA UR CULT: NORMAL
BASOPHILS # BLD AUTO: 0.01 K/UL
BASOPHILS NFR BLD: 0.2 %
BILIRUB SERPL-MCNC: 0.3 MG/DL
BILIRUB UR QL STRIP: NEGATIVE
BNP SERPL-MCNC: 74 PG/ML
BUN SERPL-MCNC: 8 MG/DL
CALCIUM SERPL-MCNC: 9.2 MG/DL
CHLORIDE SERPL-SCNC: 104 MMOL/L
CLARITY UR: ABNORMAL
CO2 SERPL-SCNC: 32 MMOL/L
COLOR UR: YELLOW
CREAT SERPL-MCNC: 0.8 MG/DL
DIFFERENTIAL METHOD: ABNORMAL
EOSINOPHIL # BLD AUTO: 0.2 K/UL
EOSINOPHIL NFR BLD: 3.9 %
ERYTHROCYTE [DISTWIDTH] IN BLOOD BY AUTOMATED COUNT: 13.6 %
EST. GFR  (AFRICAN AMERICAN): >60 ML/MIN/1.73 M^2
EST. GFR  (NON AFRICAN AMERICAN): >60 ML/MIN/1.73 M^2
GLUCOSE SERPL-MCNC: 94 MG/DL
GLUCOSE UR QL STRIP: NEGATIVE
HCT VFR BLD AUTO: 33.1 %
HGB BLD-MCNC: 10 G/DL
HGB UR QL STRIP: ABNORMAL
HYALINE CASTS #/AREA URNS LPF: 0 /LPF
KETONES UR QL STRIP: NEGATIVE
LEUKOCYTE ESTERASE UR QL STRIP: ABNORMAL
LYMPHOCYTES # BLD AUTO: 1 K/UL
LYMPHOCYTES NFR BLD: 18.9 %
MCH RBC QN AUTO: 27.1 PG
MCHC RBC AUTO-ENTMCNC: 30.2 G/DL
MCV RBC AUTO: 90 FL
MICROSCOPIC COMMENT: ABNORMAL
MONOCYTES # BLD AUTO: 0.6 K/UL
MONOCYTES NFR BLD: 10.6 %
NEUTROPHILS # BLD AUTO: 3.4 K/UL
NEUTROPHILS NFR BLD: 66.2 %
NITRITE UR QL STRIP: POSITIVE
PH UR STRIP: >8 [PH] (ref 5–8)
PLATELET # BLD AUTO: 186 K/UL
PMV BLD AUTO: 9.7 FL
POTASSIUM SERPL-SCNC: 4.5 MMOL/L
PROT SERPL-MCNC: 7 G/DL
PROT UR QL STRIP: ABNORMAL
RBC # BLD AUTO: 3.69 M/UL
RBC #/AREA URNS HPF: >100 /HPF (ref 0–4)
SODIUM SERPL-SCNC: 142 MMOL/L
SP GR UR STRIP: 1.01 (ref 1–1.03)
SQUAMOUS #/AREA URNS HPF: 0 /HPF
TRI-PHOS CRY URNS QL MICRO: ABNORMAL
URN SPEC COLLECT METH UR: ABNORMAL
UROBILINOGEN UR STRIP-ACNC: NEGATIVE EU/DL
WBC # BLD AUTO: 5.18 K/UL
WBC #/AREA URNS HPF: 10 /HPF (ref 0–5)

## 2017-12-28 PROCEDURE — 96365 THER/PROPH/DIAG IV INF INIT: CPT

## 2017-12-28 PROCEDURE — 25000003 PHARM REV CODE 250: Performed by: HOSPITALIST

## 2017-12-28 PROCEDURE — 63600175 PHARM REV CODE 636 W HCPCS: Performed by: HOSPITALIST

## 2017-12-28 PROCEDURE — 96375 TX/PRO/DX INJ NEW DRUG ADDON: CPT

## 2017-12-28 PROCEDURE — 81000 URINALYSIS NONAUTO W/SCOPE: CPT

## 2017-12-28 PROCEDURE — G0378 HOSPITAL OBSERVATION PER HR: HCPCS

## 2017-12-28 PROCEDURE — 99285 EMERGENCY DEPT VISIT HI MDM: CPT | Mod: 25

## 2017-12-28 PROCEDURE — 63600175 PHARM REV CODE 636 W HCPCS: Performed by: EMERGENCY MEDICINE

## 2017-12-28 PROCEDURE — 83880 ASSAY OF NATRIURETIC PEPTIDE: CPT

## 2017-12-28 PROCEDURE — 96366 THER/PROPH/DIAG IV INF ADDON: CPT

## 2017-12-28 PROCEDURE — 80053 COMPREHEN METABOLIC PANEL: CPT

## 2017-12-28 PROCEDURE — 25000003 PHARM REV CODE 250: Performed by: NURSE PRACTITIONER

## 2017-12-28 PROCEDURE — 85025 COMPLETE CBC W/AUTO DIFF WBC: CPT

## 2017-12-28 RX ORDER — LEVOTHYROXINE SODIUM 112 UG/1
112 TABLET ORAL
Status: DISCONTINUED | OUTPATIENT
Start: 2017-12-29 | End: 2017-12-29 | Stop reason: HOSPADM

## 2017-12-28 RX ORDER — ASPIRIN 81 MG/1
81 TABLET ORAL DAILY
Status: DISCONTINUED | OUTPATIENT
Start: 2017-12-29 | End: 2017-12-29 | Stop reason: HOSPADM

## 2017-12-28 RX ORDER — ACETAMINOPHEN 325 MG/1
650 TABLET ORAL EVERY 6 HOURS PRN
Status: DISCONTINUED | OUTPATIENT
Start: 2017-12-28 | End: 2017-12-29 | Stop reason: HOSPADM

## 2017-12-28 RX ORDER — POTASSIUM CITRATE 5 MEQ/1
5 TABLET, EXTENDED RELEASE ORAL
Status: DISCONTINUED | OUTPATIENT
Start: 2017-12-29 | End: 2017-12-28

## 2017-12-28 RX ORDER — ATORVASTATIN CALCIUM 40 MG/1
80 TABLET, FILM COATED ORAL NIGHTLY
Status: DISCONTINUED | OUTPATIENT
Start: 2017-12-28 | End: 2017-12-29 | Stop reason: HOSPADM

## 2017-12-28 RX ORDER — SODIUM CHLORIDE 0.9 % (FLUSH) 0.9 %
5 SYRINGE (ML) INJECTION
Status: DISCONTINUED | OUTPATIENT
Start: 2017-12-28 | End: 2017-12-29 | Stop reason: HOSPADM

## 2017-12-28 RX ORDER — BUTALBITAL, ACETAMINOPHEN AND CAFFEINE 50; 325; 40 MG/1; MG/1; MG/1
1 TABLET ORAL EVERY 4 HOURS PRN
Status: DISCONTINUED | OUTPATIENT
Start: 2017-12-28 | End: 2017-12-29 | Stop reason: HOSPADM

## 2017-12-28 RX ORDER — PANTOPRAZOLE SODIUM 40 MG/1
40 TABLET, DELAYED RELEASE ORAL DAILY
Status: DISCONTINUED | OUTPATIENT
Start: 2017-12-29 | End: 2017-12-29 | Stop reason: HOSPADM

## 2017-12-28 RX ORDER — FUROSEMIDE 10 MG/ML
40 INJECTION INTRAMUSCULAR; INTRAVENOUS 3 TIMES DAILY
Status: DISCONTINUED | OUTPATIENT
Start: 2017-12-28 | End: 2017-12-29 | Stop reason: HOSPADM

## 2017-12-28 RX ORDER — BUTALBITAL, ASPIRIN, AND CAFFEINE 325; 50; 40 MG/1; MG/1; MG/1
1 CAPSULE ORAL EVERY 6 HOURS PRN
Status: DISCONTINUED | OUTPATIENT
Start: 2017-12-28 | End: 2017-12-28

## 2017-12-28 RX ORDER — MEROPENEM AND SODIUM CHLORIDE 500 MG/50ML
500 INJECTION, SOLUTION INTRAVENOUS
Status: DISCONTINUED | OUTPATIENT
Start: 2017-12-28 | End: 2017-12-28

## 2017-12-28 RX ORDER — OXYBUTYNIN CHLORIDE 5 MG/1
15 TABLET, EXTENDED RELEASE ORAL DAILY
Status: DISCONTINUED | OUTPATIENT
Start: 2017-12-29 | End: 2017-12-29 | Stop reason: HOSPADM

## 2017-12-28 RX ORDER — ENOXAPARIN SODIUM 100 MG/ML
40 INJECTION SUBCUTANEOUS EVERY 24 HOURS
Status: DISCONTINUED | OUTPATIENT
Start: 2017-12-28 | End: 2017-12-29 | Stop reason: HOSPADM

## 2017-12-28 RX ORDER — CEPHALEXIN 500 MG/1
500 CAPSULE ORAL
Status: COMPLETED | OUTPATIENT
Start: 2017-12-28 | End: 2017-12-28

## 2017-12-28 RX ORDER — DOCUSATE SODIUM 100 MG/1
300 CAPSULE, LIQUID FILLED ORAL NIGHTLY
Status: DISCONTINUED | OUTPATIENT
Start: 2017-12-28 | End: 2017-12-29 | Stop reason: HOSPADM

## 2017-12-28 RX ORDER — ACETAMINOPHEN, DIPHENHYDRAMINE HCL, PHENYLEPHRINE HCL 325; 25; 5 MG/1; MG/1; MG/1
10 TABLET ORAL NIGHTLY
COMMUNITY
End: 2018-02-01

## 2017-12-28 RX ORDER — CIPROFLOXACIN 2 MG/ML
400 INJECTION, SOLUTION INTRAVENOUS
Status: COMPLETED | OUTPATIENT
Start: 2017-12-28 | End: 2017-12-28

## 2017-12-28 RX ORDER — FLUOXETINE HYDROCHLORIDE 20 MG/1
60 CAPSULE ORAL DAILY
Status: DISCONTINUED | OUTPATIENT
Start: 2017-12-29 | End: 2017-12-29 | Stop reason: HOSPADM

## 2017-12-28 RX ORDER — SENNOSIDES 8.6 MG/1
8.6 TABLET ORAL 2 TIMES DAILY PRN
Status: DISCONTINUED | OUTPATIENT
Start: 2017-12-28 | End: 2017-12-28

## 2017-12-28 RX ORDER — OXYCODONE HYDROCHLORIDE 5 MG/1
5 TABLET ORAL EVERY 4 HOURS PRN
Status: DISCONTINUED | OUTPATIENT
Start: 2017-12-28 | End: 2017-12-29 | Stop reason: HOSPADM

## 2017-12-28 RX ORDER — FUROSEMIDE 10 MG/ML
40 INJECTION INTRAMUSCULAR; INTRAVENOUS
Status: COMPLETED | OUTPATIENT
Start: 2017-12-28 | End: 2017-12-28

## 2017-12-28 RX ORDER — RAMELTEON 8 MG/1
8 TABLET ORAL NIGHTLY PRN
Status: DISCONTINUED | OUTPATIENT
Start: 2017-12-28 | End: 2017-12-29 | Stop reason: HOSPADM

## 2017-12-28 RX ORDER — POTASSIUM CITRATE 10 MEQ/1
10 TABLET, EXTENDED RELEASE ORAL 2 TIMES DAILY WITH MEALS
Status: DISCONTINUED | OUTPATIENT
Start: 2017-12-29 | End: 2017-12-29 | Stop reason: HOSPADM

## 2017-12-28 RX ORDER — SENNOSIDES 8.6 MG/1
4 TABLET ORAL NIGHTLY
Status: DISCONTINUED | OUTPATIENT
Start: 2017-12-28 | End: 2017-12-29 | Stop reason: HOSPADM

## 2017-12-28 RX ORDER — ASCORBIC ACID 500 MG
500 TABLET ORAL 3 TIMES DAILY
Status: DISCONTINUED | OUTPATIENT
Start: 2017-12-29 | End: 2017-12-29 | Stop reason: HOSPADM

## 2017-12-28 RX ORDER — CEPHALEXIN 500 MG/1
500 CAPSULE ORAL EVERY 8 HOURS
Status: DISCONTINUED | OUTPATIENT
Start: 2017-12-28 | End: 2017-12-29 | Stop reason: HOSPADM

## 2017-12-28 RX ORDER — HYDROCODONE BITARTRATE AND ACETAMINOPHEN 10; 325 MG/1; MG/1
1 TABLET ORAL EVERY 4 HOURS PRN
Status: DISCONTINUED | OUTPATIENT
Start: 2017-12-28 | End: 2017-12-29 | Stop reason: HOSPADM

## 2017-12-28 RX ADMIN — TRAZODONE HYDROCHLORIDE 150 MG: 100 TABLET ORAL at 09:12

## 2017-12-28 RX ADMIN — HYDROCODONE BITARTRATE AND ACETAMINOPHEN 1 TABLET: 10; 325 TABLET ORAL at 10:12

## 2017-12-28 RX ADMIN — CIPROFLOXACIN 400 MG: 2 INJECTION, SOLUTION INTRAVENOUS at 05:12

## 2017-12-28 RX ADMIN — ATORVASTATIN CALCIUM 80 MG: 40 TABLET, FILM COATED ORAL at 10:12

## 2017-12-28 RX ADMIN — CEPHALEXIN 500 MG: 500 CAPSULE ORAL at 06:12

## 2017-12-28 RX ADMIN — RAMELTEON 8 MG: 8 TABLET, FILM COATED ORAL at 10:12

## 2017-12-28 RX ADMIN — FUROSEMIDE 40 MG: 10 INJECTION, SOLUTION INTRAMUSCULAR; INTRAVENOUS at 05:12

## 2017-12-28 RX ADMIN — ENOXAPARIN SODIUM 40 MG: 100 INJECTION SUBCUTANEOUS at 09:12

## 2017-12-28 RX ADMIN — DOCUSATE SODIUM 300 MG: 100 CAPSULE, LIQUID FILLED ORAL at 10:12

## 2017-12-28 RX ADMIN — SENNOSIDES 4 TABLET: 8.6 TABLET, FILM COATED ORAL at 10:12

## 2017-12-28 RX ADMIN — FUROSEMIDE 40 MG: 10 INJECTION, SOLUTION INTRAMUSCULAR; INTRAVENOUS at 09:12

## 2017-12-28 NOTE — ED PROVIDER NOTES
Encounter Date: 12/28/2017       History     Chief Complaint   Patient presents with    Leg Swelling     Pt complaining o fpain and edema to BLE reports worsening over past week.      CHIEF COMPLAINT: Patient presents with: bilateral leg swelling    HISTORY OF PRESENT ILLNESS: Tasha Hawley who is a 62 y.o. presents to the emergency department today with complaint of increasing bilateral leg swelling. The pt has had problems with this for quite some but it is getting worse over the past week. She is having increasing pain. They have gotten to the point where she cannot walk due to the swelling and pain. She denies any shortness of breath, chest pain, or palpitations. No recent long travel.     REVIEW OF SYSTEMS:  Constitutional: No fever, no chills.  Eyes: No discharge. No pain.  HENT: No nasal drainage. No ear ache. No sore throat.   Cardiovascular: No chest pain, no palpitations.  Respiratory: No cough, no shortness of breath.  Gastrointestinal: No abdominal pain, no vomiting. No diarrhea  Genitourinary: No hematuria, dysuria, urgency.  Musculoskeletal: No back pain.  Skin: No rashes, no lesions.  Neurological: No headache, no focal weakness.    Otherwise remaining ROS negative     ALLERGIES REVIEWED  MEDICATIONS REVIEWED  PMH/PSH/SOC/FH REVIEWED     The history is provided by the patient.    Nursing/Ancillary staff note reviewed.                  Review of patient's allergies indicates:   Allergen Reactions    (d)-limonene flavor      Other reaction(s): difficult intubation  Other reaction(s): Difficulty breathing    Bactrim [sulfamethoxazole-trimethoprim] Anaphylaxis    Imitrex [sumatriptan succinate] Shortness Of Breath    Penicillins Shortness Of Breath     Other reaction(s): Jittery    Topamax [topiramate] Shortness Of Breath    Vancomycin Shortness Of Breath     Rash    Butorphanol tartrate     Darvocet a500 [propoxyphene n-acetaminophen]      Other reaction(s): Difficulty breathing     Fentanyl      Other reaction(s): Vomiting  Other reaction(s): Nausea  Other reaction(s): Itching  swelling    White petrolatum-zinc     Zinc oxide-white petrolatum      Other reaction(s): Difficulty breathing    Latex, natural rubber Itching and Rash     Past Medical History:   Diagnosis Date    Anxiety     Arthritis     Bilateral lower extremity edema     severe chronic    Carotid artery occlusion     Cataract     Depression     Fever blister     Hypothyroid     Iron deficiency anemia     Lumbar radiculopathy     with chronic pain    Ocular migraine     Sleep apnea     cpap     Past Surgical History:   Procedure Laterality Date    BACK SURGERY      x 12    CATARACT EXTRACTION W/  INTRAOCULAR LENS IMPLANT Left     Dr Coleman     HYSTERECTOMY  1975    endometriosis    pain pump placement      SQ Dilaudid Pump managed by Dr. Hillman, Pain Management    SPINAL CORD STIMULATOR REMOVAL      before [facility]issa    SPINE SURGERY  5-13-13    CERVICAL FUSION     Family History   Problem Relation Age of Onset    Cancer Mother 55     breast    Cancer Father      esophagus,had laryngectomy    Esophageal cancer Father     Parkinsonism Maternal Grandmother     Tremor Maternal Grandmother     No Known Problems Brother     No Known Problems Brother     Heart disease Maternal Uncle     No Known Problems Sister     No Known Problems Maternal Aunt     No Known Problems Paternal Aunt     No Known Problems Paternal Uncle     No Known Problems Maternal Grandfather     No Known Problems Paternal Grandmother     No Known Problems Paternal Grandfather     Melanoma Neg Hx     Amblyopia Neg Hx     Blindness Neg Hx     Cataracts Neg Hx     Diabetes Neg Hx     Glaucoma Neg Hx     Hypertension Neg Hx     Macular degeneration Neg Hx     Retinal detachment Neg Hx     Strabismus Neg Hx     Stroke Neg Hx     Thyroid disease Neg Hx     Colon cancer Neg Hx      Social History   Substance Use Topics     Smoking status: Never Smoker    Smokeless tobacco: Never Used    Alcohol use No      Comment: denies     Review of Systems   All other systems reviewed and are negative.      Physical Exam     Initial Vitals [12/28/17 1335]   BP Pulse Resp Temp SpO2   (!) 111/56 (!) 51 16 97.8 °F (36.6 °C) 99 %      MAP       74.33         Physical Exam    Nursing note and vitals reviewed.  Constitutional: She appears well-developed and well-nourished.   She has slow speech but this is her baseline, no changes.    HENT:   Head: Normocephalic and atraumatic.   Right Ear: External ear normal.   Left Ear: External ear normal.   Nose: Nose normal.   Mouth/Throat: Oropharynx is clear and moist.   Eyes: Conjunctivae and EOM are normal. Pupils are equal, round, and reactive to light. No scleral icterus.   Neck: Normal range of motion. Neck supple. No JVD present.   Cardiovascular: Normal rate, regular rhythm, normal heart sounds and intact distal pulses. Exam reveals no gallop and no friction rub.    No murmur heard.  Pulmonary/Chest: Breath sounds normal. No stridor. No respiratory distress. She has no wheezes. She exhibits no tenderness.   Abdominal: Soft. Bowel sounds are normal. She exhibits no distension and no mass. There is no tenderness. There is no rebound and no guarding.   Musculoskeletal: Normal range of motion. She exhibits edema (significant edema to her bilateral LE from the feet up to the knee, some skin changes erythema, a few small blisters on the left LE). She exhibits no tenderness.   Back is nontender to palpation.    Neurological: She is alert and oriented to person, place, and time. She has normal strength. No cranial nerve deficit.   Skin: Skin is warm and dry. Capillary refill takes less than 2 seconds. No rash noted. No pallor.   Psychiatric: She has a normal mood and affect. Thought content normal.         ED Course   Procedures  Labs Reviewed   CBC W/ AUTO DIFFERENTIAL   COMPREHENSIVE METABOLIC PANEL   B-TYPE  NATRIURETIC PEPTIDE   URINALYSIS             Medical Decision Making:   Differential Diagnosis:   PVD, PAD, renal insufficiency, heart failure, stasis, elephantiasis   Clinical Tests:   Lab Tests: Ordered and Reviewed  ED Management:  This is a 63 yo female who presents to the ED today with significant peripheral edema, 10 kg weight gain since her last documented weight at her PCP 12/6. She will be admitted for further management.   Other:   I have discussed this case with another health care provider.                   ED Course        5:29 PM   Paged Dr Anderson for admit    5:51 PM  Spoke with Dr Anderson who will admit    Clinical Impression:   The primary encounter diagnosis was Peripheral edema. A diagnosis of Urinary tract infection associated with indwelling urethral catheter, initial encounter was also pertinent to this visit.                           Fredi Stevenson MD  12/28/17 175       Fredi Stevenson MD  12/28/17 1742

## 2017-12-28 NOTE — TELEPHONE ENCOUNTER
----- Message from Loren John sent at 12/28/2017  8:05 AM CST -----  Contact: Self- [phone]  Pinky- pt called and stated she has been experiencing constant heart burn and would like to discuss- please call pt back at [phone]

## 2017-12-28 NOTE — PROGRESS NOTES
[facility]  Chart Review Note    Tasha Hawley is a 61 y.o. white woman with hypothyroidism, coronary artery disease with history of acute coronary syndrome in January 2016 and ischemic cardiomyopathy (ejection fraction since improved), thoracic aortic atherosclerosis, severe left atrial enlargement, diastolic dysfunction, gastroesophageal reflux disease status post Nissen fundoplication, right facial droop since birth, history of work-related back injury in the 1990s with scoliosis, lumbar degenerative disc disease, lumbar facet arthropathy, history of multiple fusions and spacers from L3-S1, status post spinal cord stimulator and intrathecal hydromorphone pump, chronic constipation, neurogenic bladder status post suprapubic catheter placement, recurrent urinary tract infections, obstructive sleep apnea, lower extremity lymphedema, chronic insomnia, and depression.  She has difficulty ambulating at baseline.  She lives in [place], [place].  She is .  Her primary care physician is Dr. Mesfin Hodges.  Her pain management specialist is Dr. Nigel Hillman.  Her urologist is Dr. Shireen Mayo.  Her gastroenterologist is Dr. Prince Vance.  Her psychiatrist is Dr. Erik Oconnor.  She can tolerate cephalosporins.   She called Dr. Mayo's clinic on 12/26/17 reporting burning with urination and dark colored urine.  She was prescribed nitrofurantoin and a urine specimen was collected.   She presented to [facility] ED on 12/28/17 with worsening of her lymphedema over the past week, to the point that she could not walk due to the swelling and pain.  BNP was only 74.  Her previously collected urine culture grew >100,000 Proteus mirabilis resistant to ciprofloxacin and trimethoprim-sulfamethoxazole, reported on the day of presentation.  She was mistakenly given a dose of ciprofloxacin.  She was also given IV furosemide 40 mg.  She was admitted to [facility] for  further IV diuretics.    Assessment/Plan:  Lymphedema of both lower extremities: Chronic.  Elevate legs.  Give furosemide 40 mg IV TID.  Monitor for azotemia, hypokalemia, and hypomagnesemia.  Home with home health when creatinine rises.  Urinary tract infection associated with cystostomy catheter: Recurrent.  Tolerates cephalosporins.  Give cephalexin.

## 2017-12-29 VITALS
HEIGHT: 64 IN | OXYGEN SATURATION: 99 % | BODY MASS INDEX: 32.52 KG/M2 | SYSTOLIC BLOOD PRESSURE: 115 MMHG | TEMPERATURE: 97 F | WEIGHT: 190.5 LBS | HEART RATE: 49 BPM | RESPIRATION RATE: 16 BRPM | DIASTOLIC BLOOD PRESSURE: 51 MMHG

## 2017-12-29 LAB
ANION GAP SERPL CALC-SCNC: 5 MMOL/L
BUN SERPL-MCNC: 8 MG/DL
CALCIUM SERPL-MCNC: 8.7 MG/DL
CHLORIDE SERPL-SCNC: 101 MMOL/L
CO2 SERPL-SCNC: 37 MMOL/L
CREAT SERPL-MCNC: 0.7 MG/DL
EST. GFR  (AFRICAN AMERICAN): >60 ML/MIN/1.73 M^2
EST. GFR  (NON AFRICAN AMERICAN): >60 ML/MIN/1.73 M^2
GLUCOSE SERPL-MCNC: 90 MG/DL
MAGNESIUM SERPL-MCNC: 2 MG/DL
POTASSIUM SERPL-SCNC: 3.5 MMOL/L
SODIUM SERPL-SCNC: 143 MMOL/L

## 2017-12-29 PROCEDURE — 36415 COLL VENOUS BLD VENIPUNCTURE: CPT

## 2017-12-29 PROCEDURE — 63600175 PHARM REV CODE 636 W HCPCS: Performed by: HOSPITALIST

## 2017-12-29 PROCEDURE — 25000003 PHARM REV CODE 250: Performed by: HOSPITALIST

## 2017-12-29 PROCEDURE — 25000003 PHARM REV CODE 250: Performed by: NURSE PRACTITIONER

## 2017-12-29 PROCEDURE — 83735 ASSAY OF MAGNESIUM: CPT

## 2017-12-29 PROCEDURE — 80048 BASIC METABOLIC PNL TOTAL CA: CPT

## 2017-12-29 PROCEDURE — 90670 PCV13 VACCINE IM: CPT | Performed by: HOSPITALIST

## 2017-12-29 PROCEDURE — G0378 HOSPITAL OBSERVATION PER HR: HCPCS

## 2017-12-29 PROCEDURE — G0009 ADMIN PNEUMOCOCCAL VACCINE: HCPCS | Performed by: HOSPITALIST

## 2017-12-29 PROCEDURE — 90471 IMMUNIZATION ADMIN: CPT | Performed by: HOSPITALIST

## 2017-12-29 RX ORDER — FAMOTIDINE 20 MG/1
20 TABLET, FILM COATED ORAL 2 TIMES DAILY
Status: DISCONTINUED | OUTPATIENT
Start: 2017-12-29 | End: 2017-12-29 | Stop reason: HOSPADM

## 2017-12-29 RX ORDER — POTASSIUM CHLORIDE 20 MEQ/1
40 TABLET, EXTENDED RELEASE ORAL 2 TIMES DAILY
Qty: 120 TABLET | Refills: 11 | Status: SHIPPED | OUTPATIENT
Start: 2017-12-29 | End: 2018-03-08 | Stop reason: SDUPTHER

## 2017-12-29 RX ORDER — CEPHALEXIN 500 MG/1
500 CAPSULE ORAL EVERY 8 HOURS
Qty: 30 CAPSULE | Refills: 0 | Status: SHIPPED | OUTPATIENT
Start: 2017-12-29 | End: 2018-01-08

## 2017-12-29 RX ORDER — FUROSEMIDE 40 MG/1
40 TABLET ORAL 2 TIMES DAILY
Qty: 60 TABLET | Refills: 11 | Status: SHIPPED | OUTPATIENT
Start: 2017-12-29 | End: 2019-02-20

## 2017-12-29 RX ADMIN — PNEUMOCOCCAL 13-VALENT CONJUGATE VACCINE 0.5 ML: 2.2; 2.2; 2.2; 2.2; 2.2; 4.4; 2.2; 2.2; 2.2; 2.2; 2.2; 2.2; 2.2 INJECTION, SUSPENSION INTRAMUSCULAR at 12:12

## 2017-12-29 RX ADMIN — OXYBUTYNIN CHLORIDE 15 MG: 5 TABLET, EXTENDED RELEASE ORAL at 08:12

## 2017-12-29 RX ADMIN — POTASSIUM CITRATE 10 MEQ: 10 TABLET ORAL at 08:12

## 2017-12-29 RX ADMIN — CEPHALEXIN 500 MG: 500 CAPSULE ORAL at 05:12

## 2017-12-29 RX ADMIN — ASPIRIN 81 MG: 81 TABLET, COATED ORAL at 08:12

## 2017-12-29 RX ADMIN — HYDROCODONE BITARTRATE AND ACETAMINOPHEN 1 TABLET: 10; 325 TABLET ORAL at 10:12

## 2017-12-29 RX ADMIN — HYDROCODONE BITARTRATE AND ACETAMINOPHEN 1 TABLET: 10; 325 TABLET ORAL at 05:12

## 2017-12-29 RX ADMIN — PANTOPRAZOLE SODIUM 40 MG: 40 TABLET, DELAYED RELEASE ORAL at 08:12

## 2017-12-29 RX ADMIN — LEVOTHYROXINE SODIUM 112 MCG: 112 TABLET ORAL at 05:12

## 2017-12-29 RX ADMIN — FUROSEMIDE 40 MG: 10 INJECTION, SOLUTION INTRAMUSCULAR; INTRAVENOUS at 05:12

## 2017-12-29 RX ADMIN — OXYCODONE HYDROCHLORIDE AND ACETAMINOPHEN 500 MG: 500 TABLET ORAL at 05:12

## 2017-12-29 RX ADMIN — FLUOXETINE 60 MG: 20 CAPSULE ORAL at 08:12

## 2017-12-29 RX ADMIN — FAMOTIDINE 20 MG: 20 TABLET, FILM COATED ORAL at 10:12

## 2017-12-29 NOTE — PLAN OF CARE
Removed IV, tip intact. Went over discharge instructions with the patient, verbalized understanding. Pt waiting for spouse to

## 2017-12-29 NOTE — ASSESSMENT & PLAN NOTE
Neurogenic bladder  Recurrent UTI  S/p suprapubic catheter  Has history of recurrent UTI; takes methenamine 1 gm BID for prophylaxis.  Urine grew proteus mirabilis sensitive to cephalosporins.  Discontinue macrobid.  Continue Keflex.  Holding methenamine.  Resume home dose oxybutynin.

## 2017-12-29 NOTE — H&P
[facility]  History & Physical    Patient Name: Tasha Hawley  MRN: [id number]  Admission Date: 12/28/2017  Attending Physician: Isaias Anderson MD   Primary Care Provider: Mesfin Hodges Ii, MD         Patient information was obtained from patient, past medical records and ER records.     Subjective:     Principal Problem:Lymphedema of both lower extremities    Chief Complaint:   Chief Complaint   Patient presents with    Leg Swelling     Pt complaining o fpain and edema to BLE reports worsening over past week.         HPI: Tasha Hawley is a 61 y.o. white woman with hypothyroidism, coronary artery disease with history of acute coronary syndrome in January 2016 and ischemic cardiomyopathy (ejection fraction since improved), thoracic aortic atherosclerosis, severe left atrial enlargement, diastolic dysfunction, gastroesophageal reflux disease status post Nissen fundoplication, right facial droop since birth, history of work-related back injury in the 1990s with scoliosis, lumbar degenerative disc disease, lumbar facet arthropathy, history of multiple fusions and spacers from L3-S1, status post spinal cord stimulator and intrathecal hydromorphone pump, chronic constipation, neurogenic bladder status post suprapubic catheter placement, recurrent urinary tract infections, obstructive sleep apnea, lower extremity lymphedema, chronic insomnia, and depression.  She has difficulty ambulating at baseline.  She lives in [place], [place].  She is .  Her primary care physician is Dr. Mesfin Hodges.  Her pain management specialist is Dr. Nigel Hillman.  Her urologist is Dr. Shireen Mayo.  Her gastroenterologist is Dr. Prince Vance.  Her psychiatrist is Dr. Erik Oconnor.  She can tolerate cephalosporins.              She called Dr. Mayo's clinic on 12/26/17 reporting burning with urination and dark colored urine.  She was prescribed nitrofurantoin and a urine specimen  was collected.              She presented to [facility] ED on 12/28/17 with progressive worsening of her lymphedema over the past 2 weeks, to the point that she could not walk due to the swelling and pain.   Erythema also worsened.  Pain and edema unrelieved by elevating extremities or compression wraps.  BNP was only 74.  Her previously collected urine culture grew >100,000 Proteus mirabilis resistant to ciprofloxacin and trimethoprim-sulfamethoxazole, reported on the day of presentation.  She was mistakenly given a dose of ciprofloxacin.  She was also given IV furosemide 40 mg.  She was admitted to [facility] for further IV diuretics.    Past Medical History:   Diagnosis Date    Anxiety     Arthritis     Bilateral lower extremity edema     severe chronic    Carotid artery occlusion     Cataract     Depression     Fever blister     Hypothyroid     Iron deficiency anemia     Lumbar radiculopathy     with chronic pain    Ocular migraine     Sleep apnea     cpap       Past Surgical History:   Procedure Laterality Date    BACK SURGERY      x 12    CATARACT EXTRACTION W/  INTRAOCULAR LENS IMPLANT Left     Dr Coleman     HYSTERECTOMY  1975    endometriosis    pain pump placement      SQ Dilaudid Pump managed by Dr. Hillman, Pain Management    SPINAL CORD STIMULATOR REMOVAL      before [place]    SPINE SURGERY  5-13-13    CERVICAL FUSION       Review of patient's allergies indicates:   Allergen Reactions    (d)-limonene flavor      Other reaction(s): difficult intubation  Other reaction(s): Difficulty breathing    Bactrim [sulfamethoxazole-trimethoprim] Anaphylaxis    Imitrex [sumatriptan succinate] Shortness Of Breath    Penicillins Shortness Of Breath     Other reaction(s): Jittery    Topamax [topiramate] Shortness Of Breath    Vancomycin Shortness Of Breath     Rash    Butorphanol tartrate     Darvocet a500 [propoxyphene n-acetaminophen]      Other  reaction(s): Difficulty breathing    Fentanyl      Other reaction(s): Vomiting  Other reaction(s): Nausea  Other reaction(s): Itching  swelling    White petrolatum-zinc     Zinc oxide-white petrolatum      Other reaction(s): Difficulty breathing    Latex, natural rubber Itching and Rash       No current facility-administered medications on file prior to encounter.      Current Outpatient Prescriptions on File Prior to Encounter   Medication Sig    aspirin (ECOTRIN) 81 MG EC tablet Take 1 tablet (81 mg total) by mouth once daily.    ascorbic acid, vitamin C, (VITAMIN C) 500 mg Chew Take 1 tablet by mouth 3 (three) times daily.    atorvastatin (LIPITOR) 80 MG tablet TAKE ONE TABLET BY MOUTH EVERY DAY. (Patient taking differently: TAKE ONE TABLET BY MOUTH Nightly)    BUTALBITAL/ASPIRIN/CAFFEINE (FIORINAL ORAL) Take by mouth as needed.    DOC-Q-LACE 100 mg capsule TAKE ONE CAPSULE BY MOUTH THREE TIMES A DAY AS NEEDED FOR CONSTIPATION    FLUoxetine (PROZAC) 20 MG capsule Take 3 capsules (60 mg total) by mouth once daily.    hydrocodone-acetaminophen 10-325mg (NORCO)  mg Tab Take 2 tablets by mouth every 4 (four) hours as needed for Pain.     levothyroxine (SYNTHROID) 112 MCG tablet Take 1 tablet (112 mcg total) by mouth before breakfast.    methenamine (HIPREX) 1 gram Tab Take 1 tablet (1 g total) by mouth 2 (two) times daily.    nitrofurantoin, macrocrystal-monohydrate, (MACROBID) 100 MG capsule Take 1 capsule (100 mg total) by mouth 2 (two) times daily.    oxybutynin (DITROPAN XL) 15 MG TR24 Take 1 tablet (15 mg total) by mouth once daily.    pantoprazole (PROTONIX) 40 MG tablet TAKE ONE TABLET BY MOUTH EVERY DAY.    potassium citrate (UROCIT-K) 5 mEq (540 mg) TbSR Take 1 tablet (5 mEq total) by mouth 3 (three) times daily with meals.    SENNOSIDES (SENNA LAX ORAL) Take 20 mg by mouth every evening.     trazodone (DESYREL) 50 MG tablet Take 3 tablets (150 mg total) by mouth every evening.     [DISCONTINUED] hydrOXYzine pamoate (VISTARIL) 25 MG Cap Take 1 capsule (25 mg total) by mouth nightly as needed (insomnia).    [DISCONTINUED] lamotrigine (LAMICTAL) 25 MG tablet Take 1 tablet (25 mg total) by mouth once daily.    [DISCONTINUED] ondansetron (ZOFRAN) 8 MG tablet Take 1 tablet (8 mg total) by mouth every 12 (twelve) hours as needed for Nausea.     Family History     Problem Relation (Age of Onset)    Cancer Mother (55), Father    Esophageal cancer Father    Heart disease Maternal Uncle    No Known Problems Brother, Brother, Sister, Maternal Aunt, Paternal Aunt, Paternal Uncle, Maternal Grandfather, Paternal Grandmother, Paternal Grandfather    Parkinsonism Maternal Grandmother    Tremor Maternal Grandmother        Social History Main Topics    Smoking status: Never Smoker    Smokeless tobacco: Never Used    Alcohol use No      Comment: denies    Drug use: No    Sexual activity: No     Review of Systems   Constitutional: Negative for chills, diaphoresis and fever.   HENT: Negative for congestion, trouble swallowing and voice change.    Eyes: Negative for photophobia, pain and visual disturbance.   Respiratory: Negative for cough, shortness of breath and wheezing.    Cardiovascular: Positive for leg swelling. Negative for chest pain and palpitations.   Gastrointestinal: Positive for constipation. Negative for abdominal pain, nausea and vomiting.   Endocrine: Negative for cold intolerance and heat intolerance.   Genitourinary: Positive for dysuria. Negative for frequency and urgency.        Has suprapubic catheter with tenderness at site; also passing urine through urethral meatus.   Musculoskeletal: Positive for gait problem and myalgias. Negative for arthralgias.   Skin: Positive for color change. Negative for pallor and rash.   Neurological: Positive for weakness. Negative for dizziness and headaches.   Hematological: Does not bruise/bleed easily.   Psychiatric/Behavioral: Negative for  agitation, confusion and hallucinations.     Objective:     Vital Signs (Most Recent):  Temp: 96.7 °F (35.9 °C) (12/28/17 2100)  Pulse: (!) 56 (12/28/17 2100)  Resp: 17 (12/28/17 2100)  BP: (!) 91/42 (12/28/17 2100)  SpO2: 99 % (12/28/17 2100) Vital Signs (24h Range):  Temp:  [96.7 °F (35.9 °C)-97.8 °F (36.6 °C)] 96.7 °F (35.9 °C)  Pulse:  [51-63] 56  Resp:  [15-17] 17  SpO2:  [96 %-99 %] 99 %  BP: ()/(42-82) 91/42     Weight: 86.4 kg (190 lb 8 oz)  Body mass index is 32.7 kg/m².    Physical Exam   Constitutional: She is oriented to person, place, and time. She appears well-developed and well-nourished. No distress.   HENT:   Head: Normocephalic and atraumatic.   Mouth/Throat: Oropharynx is clear and moist.   Eyes: EOM are normal. Pupils are equal, round, and reactive to light. No scleral icterus.   glasses   Neck: Normal range of motion. Neck supple. No JVD present.   Cardiovascular: Normal rate and regular rhythm.    Pulses:       Radial pulses are 2+ on the right side, and 2+ on the left side.        Dorsalis pedis pulses are 1+ on the right side, and 1+ on the left side.        Posterior tibial pulses are 1+ on the right side, and 1+ on the left side.   Pulmonary/Chest: Effort normal and breath sounds normal. No respiratory distress. She has no wheezes.   Abdominal: Soft. Bowel sounds are normal. She exhibits no distension. There is tenderness in the suprapubic area.       Tenderness to suprapubic catheter insertion site.   Genitourinary:   Genitourinary Comments: Suprapubic catheter draining clear, yellow urine.   Musculoskeletal: Normal range of motion. She exhibits edema and tenderness.   3+ to 4+ pitting edema to bilateral lower extremities.   Neurological: She is alert and oriented to person, place, and time. She has normal strength. GCS eye subscore is 4. GCS verbal subscore is 5. GCS motor subscore is 6.   Skin: Skin is warm and dry. There is erythema. No cyanosis. Nails show no clubbing.       "  Psychiatric: She has a normal mood and affect. Her speech is normal and behavior is normal.   Nursing note and vitals reviewed.        CRANIAL NERVES     CN III, IV, VI   Pupils are equal, round, and reactive to light.  Extraocular motions are normal.            Significant Labs:   CBC:   Recent Labs  Lab 12/28/17  1524   WBC 5.18   HGB 10.0*   HCT 33.1*        CMP:   Recent Labs  Lab 12/28/17  1524      K 4.5      CO2 32*   GLU 94   BUN 8   CREATININE 0.8   CALCIUM 9.2   PROT 7.0   ALBUMIN 3.6   BILITOT 0.3   ALKPHOS 104   AST 14   ALT 12   ANIONGAP 6*   EGFRNONAA >60     Cardiac Markers:   Recent Labs  Lab 12/28/17  1524   BNP 74     Urine Culture: No results for input(s): LABURIN in the last 48 hours.  Urine Studies:   Recent Labs  Lab 12/28/17  1524   COLORU Yellow   APPEARANCEUA Hazy*   PHUR >8.0*   SPECGRAV 1.015   PROTEINUA 1+*   GLUCUA Negative   KETONESU Negative   BILIRUBINUA Negative   OCCULTUA 3+*   NITRITE Positive*   UROBILINOGEN Negative   LEUKOCYTESUR 2+*   RBCUA >100*   WBCUA 10*   BACTERIA Many*   SQUAMEPITHEL 0   HYALINECASTS 0     Urine culture [[id number]]  Collected: 12/26/17 1554   Order Status: Completed Specimen: Urine from Urine, Catheterized Updated: 12/28/17 1726    Urine Culture, Routine --    PROTEUS MIRABILIS   >100,000 cfu/ml   No other significant isolate    Susceptibility      Proteus mirabilis     CULTURE, URINE     Amox/K Clav'ate <=8/4 "><=8/4  Sensitive     Amp/Sulbactam <=8/4 "><=8/4  Sensitive     Ampicillin <=8 "><=8  Sensitive     Cefazolin <=8 "><=8  Sensitive     Cefepime <=8 "><=8  Sensitive     Ceftriaxone <=8 "><=8  Sensitive     Ciprofloxacin >2  Resistant     Ertapenem <=2 "><=2  Sensitive     Gentamicin <=4 "><=4  Sensitive     Meropenem <=4 "><=4  Sensitive     Piperacillin/Tazo <=16 "><=16  Sensitive     Tobramycin <=4 "><=4  Sensitive     Trimeth/Sulfa >2/38  Resistant              All pertinent labs within the past 24 hours have been " reviewed.    Significant Imaging: I have reviewed all pertinent imaging results/findings within the past 24 hours.     X-Ray Chest 1 View: No evidence acute cardiac or pulmonary disease.    Assessment/Plan:     * Lymphedema of both lower extremities    Chronic.  Progressively worsened x 2 weeks.  Continue to elevate extremities.  Given lasix 40 mg IV in ED.  Diurese with lasix 40 mg TID.  Ultrasound of bilateral lower extremities to r/o DVT (increased pain, erythema, and edema).  On Keflex for UTI, also good choice of antibiotics if patient has cellulitis.          Urinary tract infection associated with cystostomy catheter    Neurogenic bladder  Recurrent UTI  S/p suprapubic catheter  Has history of recurrent UTI; takes methenamine 1 gm BID for prophylaxis.  Urine grew proteus mirabilis sensitive to cephalosporins.  Discontinue macrobid.  Continue Keflex.  Holding methenamine.  Resume home dose oxybutynin.          Other insomnia    Chronic.  Takes melatonin and trazodone qHS.  Continue trazodone.  PRN rozerem.          Primary hypothyroidism    Chronic.  Continue levothyroxine.          Coronary artery disease involving native coronary artery of native heart without angina pectoris    Chronic.  Continue ASA and atorvastatin.          GERD (gastroesophageal reflux disease)    Chronic.  Continue PPI.          Drug-induced constipation    Chronic due to high dose opioids.  Continue docusate and senna.          Diastolic dysfunction    2D echo with color flow doppler 8/21/17:    1 - Normal left ventricular systolic function (EF 60-65%).     2 - Impaired LV relaxation, normal LAP (grade 1 diastolic dysfunction).     3 - Normal right ventricular systolic function .     4 - Eccentric hypertrophy.     5 - Severe left atrial enlargement.       Chronic.  No acute abnormalities or pulmonary congestion on CXR.  BNP 74.  Daily weights.  Accurate I&Os.            Chronic pain    Narcotic dependency, continuous  Degenerative  disc disease, lumbar  Osteoarthritis of spine with radiculopathy, lumbar region  Chronic.  Has implanted programmable dilaudid pump.  Takes hydrocodone 10/325 q4h at home in addition to continuous dilaudid. PRN pain medication.  Continue to monitor.          Major depressive disorder, single episode    Chronic.  Continue home dose fluoxetine and trazodone.            VTE Risk Mitigation         Ordered     enoxaparin injection 40 mg  Daily     Route:  Subcutaneous        12/28/17 2103     Medium Risk of VTE  Once      12/28/17 2103             Wili Corley NP  Department of Hospital Medicine   [facility]

## 2017-12-29 NOTE — HPI
Tasha Hawley is a 61 y.o. white woman with hypothyroidism, coronary artery disease with history of acute coronary syndrome in January 2016 and ischemic cardiomyopathy (ejection fraction since improved), thoracic aortic atherosclerosis, severe left atrial enlargement, diastolic dysfunction, gastroesophageal reflux disease status post Nissen fundoplication, right facial droop since birth, history of work-related back injury in the 1990s with scoliosis, lumbar degenerative disc disease, lumbar facet arthropathy, history of multiple fusions and spacers from L3-S1, status post spinal cord stimulator and intrathecal hydromorphone pump, chronic constipation, neurogenic bladder status post suprapubic catheter placement, recurrent urinary tract infections, obstructive sleep apnea, lower extremity lymphedema, chronic insomnia, and depression.  She has difficulty ambulating at baseline.  She lives in [place], [place].  She is .  Her primary care physician is Dr. Mesfin Hodges.  Her pain management specialist is Dr. Nigel Hillman.  Her urologist is Dr. Shireen Mayo.  Her gastroenterologist is Dr. Prince Vance.  Her psychiatrist is Dr. Erik Oconnor.  She can tolerate cephalosporins.              She called Dr. Mayo's clinic on 12/26/17 reporting burning with urination and dark colored urine.  She was prescribed nitrofurantoin and a urine specimen was collected.              She presented to [facility] ED on 12/28/17 with progressive worsening of her lymphedema over the past 2 weeks, to the point that she could not walk due to the swelling and pain.  Erythema also worsened.  Pain and edema unrelieved by elevating extremities or compression wraps.  BNP was only 74.  Her previously collected urine culture grew >100,000 Proteus mirabilis resistant to ciprofloxacin and trimethoprim-sulfamethoxazole, reported on the day of presentation.  She was mistakenly given a dose of ciprofloxacin.  She  was also given IV furosemide 40 mg.  She was admitted to [facility] for further IV diuretics.

## 2017-12-29 NOTE — PLAN OF CARE
Problem: Patient Care Overview  Goal: Plan of Care Review  Reviewed plan of care with pt/spouse, understanding verbalized.

## 2017-12-29 NOTE — PLAN OF CARE
Patient states she would like to [facility] or [facility] for [facility].  [facility] is not staffing new cases until 12/31/17.  [facility] sent referral to [facility] and called them, they stated they would let me know if they could not service this referral,.  Patient will arrange her own PCP f/u  Follow-up With  Details  Why  Contact Info   Mesfin Hodges II, MD      [address]  [place] [address]  [phone]   [place] [id number] [address]  [phone]             12/29/17 1151   Discharge Assessment   Assessment Type Discharge Planning Assessment   Confirmed/corrected address and phone number on facesheet? Yes   Prior to hospitilization cognitive status: Alert/Oriented   Prior to hospitalization functional status: Independent   Current cognitive status: Alert/Oriented   Current Functional Status: Independent   Does the patient have transportation to healthcare appointments? Yes

## 2017-12-29 NOTE — PLAN OF CARE
Problem: Fall Risk (Adult)  Goal: Absence of Falls  Patient will demonstrate the desired outcomes by discharge/transition of care.   Fall precautions in progress, bed in lowest position, wheels locked x4, side rails up x2, bed alarm set and audible, call light within reach, instructed pt. To utilized for any assistance or concerns.  Pt. Verbalized understanding.  Will monitor pt. Throughout shift.

## 2017-12-29 NOTE — ASSESSMENT & PLAN NOTE
Chronic.  Progressively worsened x 2 weeks.  Continue to elevate extremities.  Given lasix 40 mg IV in ED.  Diurese with lasix 40 mg TID.  Ultrasound of bilateral lower extremities to r/o DVT (increased pain, erythema, and edema).  On Keflex for UTI, also good choice of antibiotics if patient has cellulitis.

## 2017-12-29 NOTE — HOSPITAL COURSE
She was put on furosemide 40 mg IV TID. By the next day her fluid status was net negative 7.2 liters.  Renal function remained normal.  Ultrasound showed no DVTs.  She was discharged home with prescriptions for furosemide 40 mg BID, potassium chloride 40 mEq BID, cephalexin for 10 days, ranitidine, and home health.

## 2017-12-29 NOTE — PLAN OF CARE
[facility] HOME HEALTH ORDERS  FACE TO FACE ENCOUNTER    Patient Name: Tasha Hawley  YOB: 1955    PCP: Mesfin Hodges Ii, MD   PCP Address: [address] / [place] [address]  PCP Phone Number: [phone]  PCP Fax: [phone]    Encounter Date: 12/29/2017    Admit to Home Health    Diagnoses:  Active Hospital Problems    Diagnosis  POA    *Lymphedema of both lower extremities [I89.0]  Yes     Chronic    Other insomnia [G47.09]  Yes     Chronic    Osteoarthritis of spine with radiculopathy, lumbar region [M47.26]  Yes     Chronic    Degenerative disc disease, lumbar [M51.36]  Yes     Chronic    S/P insertion of intrathecal pump [Z98.890]  Not Applicable     Chronic    S/P insertion of spinal cord stimulator [Z98.890]  Not Applicable     Chronic    Primary hypothyroidism [E03.9]  Yes     Chronic    Urinary tract infection associated with cystostomy catheter [T83.510A, N39.0]  Yes    Neurogenic bladder [N31.9]  Yes     Chronic    Coronary artery disease involving native coronary artery of native heart without angina pectoris [I25.10]  Yes     Chronic    Diastolic dysfunction [I51.9]  Yes     Chronic     2D echo with color flow doppler 8/21/17:    1 - Normal left ventricular systolic function (EF 60-65%).     2 - Impaired LV relaxation, normal LAP (grade 1 diastolic dysfunction).     3 - Normal right ventricular systolic function .     4 - Eccentric hypertrophy.     5 - Severe left atrial enlargement.       Drug-induced constipation [K59.03]  Yes     Chronic    GERD (gastroesophageal reflux disease) [K21.9]  Yes     Chronic    Narcotic dependency, continuous [F11.20]  Yes     Chronic    Chronic pain [G89.29]  Yes     Chronic    Major depressive disorder, single episode [F32.9]  Yes     Chronic      Resolved Hospital Problems    Diagnosis Date Resolved POA   No resolved problems to display.       Future Appointments  Date Time Provider Department  [place]   1/3/2018 1:30 PM Maricel Quick, SHONDA [facility] GASTRO [place] y   1/3/2018 4:00 PM Analilia Mercado, PhD [facility] PSYCHOL [facility]   1/11/2018 12:00 PM Marvin Badillo, PhD, [facility] SOCL WK [place] y   1/12/2018 11:20 AM Mesfin Hodges II, MD [facility] IM [place] y W   1/16/2018 11:00 AM Erik Oconnor MD [facility] PSYCH [facility]   3/1/2018 11:00 AM Mesfin Hodges II, MD [facility] IM [place] y W   3/7/2018 10:00 AM Fabi Ann MD [facility] NEURO [facility]     Follow-up Information     Mesfin Hodges Ii, MD.    Specialty:  Internal Medicine  Contact information:  [address]  [facility] [address]  [phone]                     I have seen and examined this patient face to face today. My clinical findings that support the need for the home health skilled services and home bound status are the following:  Weakness/numbness causing balance and gait disturbance due to lymphedema making it taxing to leave home.    Allergies:  Review of patient's allergies indicates:   Allergen Reactions    (d)-limonene flavor      Other reaction(s): difficult intubation  Other reaction(s): Difficulty breathing    Bactrim [sulfamethoxazole-trimethoprim] Anaphylaxis    Imitrex [sumatriptan succinate] Shortness Of Breath    Penicillins Shortness Of Breath     Other reaction(s): Jittery    Topamax [topiramate] Shortness Of Breath    Vancomycin Shortness Of Breath     Rash    Butorphanol tartrate     Darvocet a500 [propoxyphene n-acetaminophen]      Other reaction(s): Difficulty breathing    Fentanyl      Other reaction(s): Vomiting  Other reaction(s): Nausea  Other reaction(s): Itching  swelling    White petrolatum-zinc     Zinc oxide-white petrolatum      Other reaction(s): Difficulty breathing    Latex, natural rubber Itching and Rash       Diet: regular diet    Activities: activity as tolerated    Nursing:   SN to complete comprehensive assessment including routine vital signs. Instruct on disease process and s/s of  complications to report to MD. Review/verify medication list sent home with the patient at time of discharge  and instruct patient/caregiver as needed. Frequency may be adjusted depending on start of care date.    Notify MD if SBP > 160 or < 90; DBP > 90 or < 50; HR > 120 or < 50; Temp > 101      CONSULTS:    Physical Therapy to evaluate and treat. Evaluate for home safety and equipment needs; Establish/upgrade home exercise program. Perform / instruct on therapeutic exercises, gait training, transfer training, and Range of Motion.  Occupational Therapy to evaluate and treat. Evaluate home environment for safety and equipment needs. Perform/Instruct on transfers, ADL training, ROM, and therapeutic exercises.  Aide to provide assistance with personal care, ADLs, and vital signs.    MISCELLANEOUS CARE:  Motley Care: Instruct patient/caregiver to empty Motley bag.  Change Motley every month.    WOUND CARE ORDERS  yes:  leg ulcers - dry gauze daily until better      Medications: Review discharge medications with patient and family and provide education.      Current Discharge Medication List      START taking these medications    Details   cephALEXin (KEFLEX) 500 MG capsule Take 1 capsule (500 mg total) by mouth every 8 (eight) hours.  Qty: 30 capsule, Refills: 0      furosemide (LASIX) 40 MG tablet Take 1 tablet (40 mg total) by mouth 2 (two) times daily.  Qty: 60 tablet, Refills: 11      potassium chloride SA (K-DUR,KLOR-CON) 20 MEQ tablet Take 2 tablets (40 mEq total) by mouth 2 (two) times daily.  Qty: 120 tablet, Refills: 11      ranitidine (ZANTAC) 150 MG capsule Take 1 capsule (150 mg total) by mouth 2 (two) times daily.  Qty: 60 capsule, Refills: 2         CONTINUE these medications which have NOT CHANGED    Details   aspirin (ECOTRIN) 81 MG EC tablet Take 1 tablet (81 mg total) by mouth once daily.  Refills: 0      melatonin 10 mg Tab Take 10 mg by mouth every evening.      ascorbic acid, vitamin C, (VITAMIN C)  500 mg Chew Take 1 tablet by mouth 3 (three) times daily.  Qty: 90 each, Refills: 11    Associated Diagnoses: Urinary tract infection associated with cystostomy catheter, subsequent encounter      atorvastatin (LIPITOR) 80 MG tablet TAKE ONE TABLET BY MOUTH EVERY DAY.  Qty: 90 tablet, Refills: 3      BUTALBITAL/ASPIRIN/CAFFEINE (FIORINAL ORAL) Take by mouth as needed.      DOC-Q-LACE 100 mg capsule TAKE ONE CAPSULE BY MOUTH THREE TIMES A DAY AS NEEDED FOR CONSTIPATION  Qty: 90 capsule, Refills: 3      FLUoxetine (PROZAC) 20 MG capsule Take 3 capsules (60 mg total) by mouth once daily.  Qty: 90 capsule, Refills: 2      hydrocodone-acetaminophen 10-325mg (NORCO)  mg Tab Take 2 tablets by mouth every 4 (four) hours as needed for Pain.       levothyroxine (SYNTHROID) 112 MCG tablet Take 1 tablet (112 mcg total) by mouth before breakfast.  Qty: 90 tablet, Refills: 3    Associated Diagnoses: Primary hypothyroidism      methenamine (HIPREX) 1 gram Tab Take 1 tablet (1 g total) by mouth 2 (two) times daily.  Qty: 60 tablet, Refills: 11    Associated Diagnoses: Recurrent UTI      nitrofurantoin, macrocrystal-monohydrate, (MACROBID) 100 MG capsule Take 1 capsule (100 mg total) by mouth 2 (two) times daily.  Qty: 14 capsule, Refills: 0    Associated Diagnoses: Bacterial UTI      oxybutynin (DITROPAN XL) 15 MG TR24 Take 1 tablet (15 mg total) by mouth once daily.  Qty: 30 tablet, Refills: 6    Associated Diagnoses: Bladder spasm      pantoprazole (PROTONIX) 40 MG tablet TAKE ONE TABLET BY MOUTH EVERY DAY.  Qty: 90 tablet, Refills: 2      potassium citrate (UROCIT-K) 5 mEq (540 mg) TbSR Take 1 tablet (5 mEq total) by mouth 3 (three) times daily with meals.  Qty: 90 tablet, Refills: 11    Associated Diagnoses: Abnormal urine sediment      SENNOSIDES (SENNA LAX ORAL) Take 20 mg by mouth every evening.       trazodone (DESYREL) 50 MG tablet Take 3 tablets (150 mg total) by mouth every evening.  Qty: 90 tablet, Refills: 3     Associated Diagnoses: Insomnia, unspecified type         STOP taking these medications       lamotrigine (LAMICTAL) 25 MG tablet Comments:   Reason for Stopping:               I certify that this patient is confined to her home and needs intermittent skilled nursing care, physical therapy and occupational therapy.      Isaias Anderson MD  [facility]  12/29/2017

## 2017-12-29 NOTE — PROGRESS NOTES
[facility] Hospital Medicine  Progress Note    Patient Name: Tasha Hawley  MRN: [id number]  Patient Class: OP- Observation   Admission Date: 12/28/2017  Length of Stay: 0 days  Attending Physician: Isaias Anderson MD  Primary Care Provider: Mesfin Hodges Ii, MD        Subjective:     Principal Problem:Lymphedema of both lower extremities    HPI:  Tasha Hawley is a 61 y.o. white woman with hypothyroidism, coronary artery disease with history of acute coronary syndrome in January 2016 and ischemic cardiomyopathy (ejection fraction since improved), thoracic aortic atherosclerosis, severe left atrial enlargement, diastolic dysfunction, gastroesophageal reflux disease status post Nissen fundoplication, right facial droop since birth, history of work-related back injury in the 1990s with scoliosis, lumbar degenerative disc disease, lumbar facet arthropathy, history of multiple fusions and spacers from L3-S1, status post spinal cord stimulator and intrathecal hydromorphone pump, chronic constipation, neurogenic bladder status post suprapubic catheter placement, recurrent urinary tract infections, obstructive sleep apnea, lower extremity lymphedema, chronic insomnia, and depression.  She has difficulty ambulating at baseline.  She lives in [place], [place].  She is .  Her primary care physician is Dr. Mesfin Hodges.  Her pain management specialist is Dr. Nigel Hillman.  Her urologist is Dr. Shireen Mayo.  Her gastroenterologist is Dr. Prince Vance.  Her psychiatrist is Dr. Erik Oconnor.  She can tolerate cephalosporins.              She called Dr. Mayo's clinic on 12/26/17 reporting burning with urination and dark colored urine.  She was prescribed nitrofurantoin and a urine specimen was collected.              She presented to [facility] ED on 12/28/17 with progressive worsening of her lymphedema over the past 2 weeks, to the point that she could not walk  due to the swelling and pain.   Erythema also worsened.  Pain and edema unrelieved by elevating extremities or compression wraps.  BNP was only 74.  Her previously collected urine culture grew >100,000 Proteus mirabilis resistant to ciprofloxacin and trimethoprim-sulfamethoxazole, reported on the day of presentation.  She was mistakenly given a dose of ciprofloxacin.  She was also given IV furosemide 40 mg.  She was admitted to [facility] for further IV diuretics.    Hospital Course:  She was put on furosemide 40 mg IV TID. By the next day her fluid status was net negative 7.2 liters.  Renal function remained normal.  Ultrasound showed no DVTs.  She was discharged home with prescriptions for furosemide 40 mg BID, potassium chloride 40 mEq BID, cephalexin for 10 days, ranitidine, and home health.    Interval History: Ready to go home.  Can clean her cystostomy site on her own.    Review of Systems   Constitutional: Negative for chills and fever.   Respiratory: Negative for cough and shortness of breath.    Cardiovascular: Positive for leg swelling.   Musculoskeletal: Positive for gait problem.     Objective:     Vital Signs (Most Recent):  Temp: 97.1 °F (36.2 °C) (12/29/17 0751)  Pulse: (!) 49 (12/29/17 0751)  Resp: 16 (12/29/17 0751)  BP: (!) 115/51 (12/29/17 0751)  SpO2: 99 % (12/28/17 2100) Vital Signs (24h Range):  Temp:  [96.7 °F (35.9 °C)-98.3 °F (36.8 °C)] 97.1 °F (36.2 °C)  Pulse:  [49-63] 49  Resp:  [15-18] 16  SpO2:  [96 %-99 %] 99 %  BP: ()/(42-82) 115/51     Weight: 86.4 kg (190 lb 8 oz)  Body mass index is 32.7 kg/m².    Intake/Output Summary (Last 24 hours) at 12/29/17 1137  Last data filed at 12/29/17 0800   Gross per 24 hour   Intake              450 ml   Output             7650 ml   Net            -7200 ml      Physical Exam   Constitutional: She is oriented to person, place, and time. She appears well-developed. No distress.   Cardiovascular: Normal rate and regular rhythm.     Pulmonary/Chest: Effort normal and breath sounds normal. No respiratory distress.   Musculoskeletal: She exhibits edema and tenderness.   Neurological: She is alert and oriented to person, place, and time.   Psychiatric: She has a normal mood and affect.   Nursing note and vitals reviewed.      Significant Labs:   CMP:   Recent Labs  Lab 12/28/17  1524 12/29/17  0345    143   K 4.5 3.5    101   CO2 32* 37*   GLU 94 90   BUN 8 8   CREATININE 0.8 0.7   CALCIUM 9.2 8.7   PROT 7.0  --    ALBUMIN 3.6  --    BILITOT 0.3  --    ALKPHOS 104  --    AST 14  --    ALT 12  --    ANIONGAP 6* 5*   EGFRNONAA >60 >60     All pertinent labs within the past 24 hours have been reviewed.    Significant Imaging: I have reviewed all pertinent imaging results/findings within the past 24 hours.   X-Ray Chest 1 View 12/28/17: There is no pneumothorax, pleural effusion or focal consolidation.  The cardiac silhouette is within normal limits. The trachea is midline.  The osseous structures are unremarkable.  US Lower Extremity Veins Bilateral 12/29/17: Bilateral lower extremity venous Duplex ultrasound performed with Doppler Spectral analysis and Color flow. The deep veins of the lower extremities demonstrate normal Doppler color flow and compressibility.  No fluid collections identified.  The common femoral veins demonstrates normal phasicity of waveform.    Assessment/Plan:      * Lymphedema of both lower extremities    Chronic.  Progressively worsened x 2 weeks.  Continue to elevate extremities.  Given lasix 40 mg IV in ED.  Diurese with lasix 40 mg TID.  Prescribe Lasix and potassium.  On Keflex for UTI, also good choice of antibiotics if patient has cellulitis.          Other insomnia    Chronic.  Takes melatonin and trazodone qHS.  Continue trazodone.  PRN rozerem.          Primary hypothyroidism    Chronic.  Continue levothyroxine.          Urinary tract infection associated with cystostomy catheter    Neurogenic  bladder  Recurrent UTI  S/p suprapubic catheter  Has history of recurrent UTI; takes methenamine 1 gm BID for prophylaxis.  Urine grew proteus mirabilis sensitive to cephalosporins.  Discontinue macrobid.  Continue Keflex.  Holding methenamine.  Resume home dose oxybutynin.          Coronary artery disease involving native coronary artery of native heart without angina pectoris    Chronic.  Continue ASA and atorvastatin.          GERD (gastroesophageal reflux disease)    Chronic.  Continue PPI.  Has follow-up with GI.  Taking Zantac at home.  Prescribe.          Drug-induced constipation    Chronic due to high dose opioids.  Continue docusate and senna.          Diastolic dysfunction    Chronic.  No acute abnormalities or pulmonary congestion on CXR.  BNP 74.  Daily weights.  Accurate I&Os.            Chronic pain    Narcotic dependency, continuous  Degenerative disc disease, lumbar  Osteoarthritis of spine with radiculopathy, lumbar region  Chronic.  Has implanted programmable dilaudid pump.  Takes hydrocodone 10/325 q4h at home in addition to continuous dilaudid. PRN pain medication.  Continue to monitor.          Major depressive disorder, single episode    Chronic.  Continue home dose fluoxetine and trazodone.            VTE Risk Mitigation         Ordered     enoxaparin injection 40 mg  Daily     Route:  Subcutaneous        12/28/17 2103     Medium Risk of VTE  Once      12/28/17 2103              Isaias Anderson MD  Department of Hospital Medicine   [facility]

## 2017-12-29 NOTE — NURSING
Patient arrived into room 403 via stretcher. Pt. AAOx3, bilateral lower extremities +4, noted redness and warmth.  Oriented pt. And spouse to room, understanding verbalized.  Will monitor pt. Throughout shift.

## 2017-12-29 NOTE — ASSESSMENT & PLAN NOTE
Chronic.  Progressively worsened x 2 weeks.  Continue to elevate extremities.  Given lasix 40 mg IV in ED.  Diurese with lasix 40 mg TID.  Prescribe Lasix and potassium.  On Keflex for UTI, also good choice of antibiotics if patient has cellulitis.

## 2017-12-29 NOTE — SUBJECTIVE & OBJECTIVE
Interval History: Ready to go home.  Can clean her cystostomy site on her own.    Review of Systems   Constitutional: Negative for chills and fever.   Respiratory: Negative for cough and shortness of breath.    Cardiovascular: Positive for leg swelling.   Musculoskeletal: Positive for gait problem.     Objective:     Vital Signs (Most Recent):  Temp: 97.1 °F (36.2 °C) (12/29/17 0751)  Pulse: (!) 49 (12/29/17 0751)  Resp: 16 (12/29/17 0751)  BP: (!) 115/51 (12/29/17 0751)  SpO2: 99 % (12/28/17 2100) Vital Signs (24h Range):  Temp:  [96.7 °F (35.9 °C)-98.3 °F (36.8 °C)] 97.1 °F (36.2 °C)  Pulse:  [49-63] 49  Resp:  [15-18] 16  SpO2:  [96 %-99 %] 99 %  BP: ()/(42-82) 115/51     Weight: 86.4 kg (190 lb 8 oz)  Body mass index is 32.7 kg/m².    Intake/Output Summary (Last 24 hours) at 12/29/17 1137  Last data filed at 12/29/17 0800   Gross per 24 hour   Intake              450 ml   Output             7650 ml   Net            -7200 ml      Physical Exam   Constitutional: She is oriented to person, place, and time. She appears well-developed. No distress.   Cardiovascular: Normal rate and regular rhythm.    Pulmonary/Chest: Effort normal and breath sounds normal. No respiratory distress.   Musculoskeletal: She exhibits edema and tenderness.   Neurological: She is alert and oriented to person, place, and time.   Psychiatric: She has a normal mood and affect.   Nursing note and vitals reviewed.      Significant Labs:   CMP:   Recent Labs  Lab 12/28/17  1524 12/29/17  0345    143   K 4.5 3.5    101   CO2 32* 37*   GLU 94 90   BUN 8 8   CREATININE 0.8 0.7   CALCIUM 9.2 8.7   PROT 7.0  --    ALBUMIN 3.6  --    BILITOT 0.3  --    ALKPHOS 104  --    AST 14  --    ALT 12  --    ANIONGAP 6* 5*   EGFRNONAA >60 >60     All pertinent labs within the past 24 hours have been reviewed.    Significant Imaging: I have reviewed all pertinent imaging results/findings within the past 24 hours.   X-Ray Chest 1 View 12/28/17:  There is no pneumothorax, pleural effusion or focal consolidation.  The cardiac silhouette is within normal limits. The trachea is midline.  The osseous structures are unremarkable.  US Lower Extremity Veins Bilateral 12/29/17: Bilateral lower extremity venous Duplex ultrasound performed with Doppler Spectral analysis and Color flow. The deep veins of the lower extremities demonstrate normal Doppler color flow and compressibility.  No fluid collections identified.  The common femoral veins demonstrates normal phasicity of waveform.

## 2017-12-29 NOTE — DISCHARGE SUMMARY
[facility] Medicine  Discharge Summary      Patient Name: Tasha Hawley  MRN: [id number]  Admission Date: 12/28/2017  Hospital Length of Stay: 0 days  Discharge Date and Time: 12/29/2017  1:45 PM  Attending Physician: Isaias Anderson MD   Discharging Provider: Isaias Anderson MD  Primary Care Provider: Mesfin Hodges Ii, MD      HPI:   Tasha Hawley is a 61 y.o. white woman with hypothyroidism, coronary artery disease with history of acute coronary syndrome in January 2016 and ischemic cardiomyopathy (ejection fraction since improved), thoracic aortic atherosclerosis, severe left atrial enlargement, diastolic dysfunction, gastroesophageal reflux disease status post Nissen fundoplication, right facial droop since birth, history of work-related back injury in the 1990s with scoliosis, lumbar degenerative disc disease, lumbar facet arthropathy, history of multiple fusions and spacers from L3-S1, status post spinal cord stimulator and intrathecal hydromorphone pump, chronic constipation, neurogenic bladder status post suprapubic catheter placement, recurrent urinary tract infections, obstructive sleep apnea, lower extremity lymphedema, chronic insomnia, and depression.  She has difficulty ambulating at baseline.  She lives in [place], [place].  She is .  Her primary care physician is Dr. Mesfin Hodges.  Her pain management specialist is Dr. Nigel Hillman.  Her urologist is Dr. Shireen Mayo.  Her gastroenterologist is Dr. Prince Vance.  Her psychiatrist is Dr. Erik Oconnor.  She can tolerate cephalosporins.              She called Dr. Mayo's clinic on 12/26/17 reporting burning with urination and dark colored urine.  She was prescribed nitrofurantoin and a urine specimen was collected.              She presented to [facility] ED on 12/28/17 with progressive worsening of her lymphedema over the past 2 weeks, to the point that she could not walk due  to the swelling and pain.   Erythema also worsened.  Pain and edema unrelieved by elevating extremities or compression wraps.  BNP was only 74.  Her previously collected urine culture grew >100,000 Proteus mirabilis resistant to ciprofloxacin and trimethoprim-sulfamethoxazole, reported on the day of presentation.  She was mistakenly given a dose of ciprofloxacin.  She was also given IV furosemide 40 mg.  She was admitted to [facility] for further IV diuretics.    Hospital Course:   She was put on furosemide 40 mg IV TID. By the next day her fluid status was net negative 7.2 liters.  Renal function remained normal.  Ultrasound showed no DVTs.  She was discharged home with prescriptions for furosemide 40 mg BID, potassium chloride 40 mEq BID, cephalexin for 10 days, ranitidine, and home health.     Consults: None    Final Active Diagnoses:    Diagnosis Date Noted POA    PRINCIPAL PROBLEM:  Lymphedema of both lower extremities [I89.0] 03/02/2017 Yes     Chronic    Other insomnia [G47.09] 12/08/2017 Yes     Chronic    Osteoarthritis of spine with radiculopathy, lumbar region [M47.26] 03/31/2017 Yes     Chronic    Degenerative disc disease, lumbar [M51.36] 03/31/2017 Yes     Chronic    S/P insertion of intrathecal pump [Z98.890] 03/31/2017 Not Applicable     Chronic    S/P insertion of spinal cord stimulator [Z98.890] 03/31/2017 Not Applicable     Chronic    Primary hypothyroidism [E03.9] 02/02/2017 Yes     Chronic    Urinary tract infection associated with cystostomy catheter [T83.510A, N39.0] 11/06/2016 Yes    Neurogenic bladder [N31.9] 09/27/2016 Yes     Chronic    Coronary artery disease involving native coronary artery of native heart without angina pectoris [I25.10] 08/16/2016 Yes     Chronic    Diastolic dysfunction [I51.9] 08/16/2016 Yes     Chronic    Drug-induced constipation [K59.03] 08/16/2016 Yes     Chronic    GERD (gastroesophageal reflux disease) [K21.9] 08/16/2016 Yes      Chronic    Narcotic dependency, continuous [F11.20] 07/18/2014 Yes     Chronic    Chronic pain [G89.29] 05/01/2013 Yes     Chronic    Major depressive disorder, single episode [F32.9] 02/21/2013 Yes     Chronic      Problems Resolved During this Admission:    Diagnosis Date Noted Date Resolved POA       Discharged Condition: good    [place] [facility]    Follow Up:  Follow-up Information     Mesfin Hodges Ii, MD.    Specialty:  Internal Medicine  Contact information:  [address]  [facility] [address] [phone]                 Patient Instructions:     Activity as tolerated     Notify your health care provider if you experience any of the following:  persistent nausea and vomiting or diarrhea     Notify your health care provider if you experience any of the following:  persistent dizziness, light-headedness, or visual disturbances         Significant Diagnostic Studies:   X-Ray Chest 1 View 12/28/17: There is no pneumothorax, pleural effusion or focal consolidation.  The cardiac silhouette is within normal limits. The trachea is midline.  The osseous structures are unremarkable.  US Lower Extremity Veins Bilateral 12/29/17: Bilateral lower extremity venous Duplex ultrasound performed with Doppler Spectral analysis and Color flow. The deep veins of the lower extremities demonstrate normal Doppler color flow and compressibility.  No fluid collections identified.  The common femoral veins demonstrates normal phasicity of waveform.     Medications:  Reconciled Home Medications:   Current Discharge Medication List      START taking these medications    Details   cephALEXin (KEFLEX) 500 MG capsule Take 1 capsule (500 mg total) by mouth every 8 (eight) hours.  Qty: 30 capsule, Refills: 0      furosemide (LASIX) 40 MG tablet Take 1 tablet (40 mg total) by mouth 2 (two) times daily.  Qty: 60 tablet, Refills: 11      potassium chloride SA (K-DUR,KLOR-CON) 20 MEQ tablet Take 2 tablets (40 mEq  total) by mouth 2 (two) times daily.  Qty: 120 tablet, Refills: 11      ranitidine (ZANTAC) 150 MG capsule Take 1 capsule (150 mg total) by mouth 2 (two) times daily.  Qty: 60 capsule, Refills: 2         CONTINUE these medications which have NOT CHANGED    Details   aspirin (ECOTRIN) 81 MG EC tablet Take 1 tablet (81 mg total) by mouth once daily.  Refills: 0      melatonin 10 mg Tab Take 10 mg by mouth every evening.      ascorbic acid, vitamin C, (VITAMIN C) 500 mg Chew Take 1 tablet by mouth 3 (three) times daily.  Qty: 90 each, Refills: 11    Associated Diagnoses: Urinary tract infection associated with cystostomy catheter, subsequent encounter      atorvastatin (LIPITOR) 80 MG tablet TAKE ONE TABLET BY MOUTH EVERY DAY.  Qty: 90 tablet, Refills: 3      BUTALBITAL/ASPIRIN/CAFFEINE (FIORINAL ORAL) Take by mouth as needed.      DOC-Q-LACE 100 mg capsule TAKE ONE CAPSULE BY MOUTH THREE TIMES A DAY AS NEEDED FOR CONSTIPATION  Qty: 90 capsule, Refills: 3      FLUoxetine (PROZAC) 20 MG capsule Take 3 capsules (60 mg total) by mouth once daily.  Qty: 90 capsule, Refills: 2      hydrocodone-acetaminophen 10-325mg (NORCO)  mg Tab Take 2 tablets by mouth every 4 (four) hours as needed for Pain.       levothyroxine (SYNTHROID) 112 MCG tablet Take 1 tablet (112 mcg total) by mouth before breakfast.  Qty: 90 tablet, Refills: 3    Associated Diagnoses: Primary hypothyroidism      methenamine (HIPREX) 1 gram Tab Take 1 tablet (1 g total) by mouth 2 (two) times daily.  Qty: 60 tablet, Refills: 11    Associated Diagnoses: Recurrent UTI      nitrofurantoin, macrocrystal-monohydrate, (MACROBID) 100 MG capsule Take 1 capsule (100 mg total) by mouth 2 (two) times daily.  Qty: 14 capsule, Refills: 0    Associated Diagnoses: Bacterial UTI      oxybutynin (DITROPAN XL) 15 MG TR24 Take 1 tablet (15 mg total) by mouth once daily.  Qty: 30 tablet, Refills: 6    Associated Diagnoses: Bladder spasm      pantoprazole (PROTONIX) 40 MG  tablet TAKE ONE TABLET BY MOUTH EVERY DAY.  Qty: 90 tablet, Refills: 2      potassium citrate (UROCIT-K) 5 mEq (540 mg) TbSR Take 1 tablet (5 mEq total) by mouth 3 (three) times daily with meals.  Qty: 90 tablet, Refills: 11    Associated Diagnoses: Abnormal urine sediment      SENNOSIDES (SENNA LAX ORAL) Take 20 mg by mouth every evening.       trazodone (DESYREL) 50 MG tablet Take 3 tablets (150 mg total) by mouth every evening.  Qty: 90 tablet, Refills: 3    Associated Diagnoses: Insomnia, unspecified type         STOP taking these medications       lamotrigine (LAMICTAL) 25 MG tablet Comments:   Reason for Stopping:               Indwelling Lines/Drains at time of discharge: None  Time spent on the discharge of patient: 35 minutes  Patient was seen and examined on the date of discharge and determined to be suitable for discharge.         Isaias Anderson MD  Department of Hospital Medicine  [facility]

## 2017-12-29 NOTE — SUBJECTIVE & OBJECTIVE
Past Medical History:   Diagnosis Date    Anxiety     Arthritis     Bilateral lower extremity edema     severe chronic    Carotid artery occlusion     Cataract     Depression     Fever blister     Hypothyroid     Iron deficiency anemia     Lumbar radiculopathy     with chronic pain    Ocular migraine     Sleep apnea     cpap       Past Surgical History:   Procedure Laterality Date    BACK SURGERY      x 12    CATARACT EXTRACTION W/  INTRAOCULAR LENS IMPLANT Left     Dr Coleman     HYSTERECTOMY  1975    endometriosis    pain pump placement      SQ Dilaudid Pump managed by Dr. Hillman, Pain Management    SPINAL CORD STIMULATOR REMOVAL      before [place]    SPINE SURGERY  5-13-13    CERVICAL FUSION       Review of patient's allergies indicates:   Allergen Reactions    (d)-limonene flavor      Other reaction(s): difficult intubation  Other reaction(s): Difficulty breathing    Bactrim [sulfamethoxazole-trimethoprim] Anaphylaxis    Imitrex [sumatriptan succinate] Shortness Of Breath    Penicillins Shortness Of Breath     Other reaction(s): Jittery    Topamax [topiramate] Shortness Of Breath    Vancomycin Shortness Of Breath     Rash    Butorphanol tartrate     Darvocet a500 [propoxyphene n-acetaminophen]      Other reaction(s): Difficulty breathing    Fentanyl      Other reaction(s): Vomiting  Other reaction(s): Nausea  Other reaction(s): Itching  swelling    White petrolatum-zinc     Zinc oxide-white petrolatum      Other reaction(s): Difficulty breathing    Latex, natural rubber Itching and Rash       No current facility-administered medications on file prior to encounter.      Current Outpatient Prescriptions on File Prior to Encounter   Medication Sig    aspirin (ECOTRIN) 81 MG EC tablet Take 1 tablet (81 mg total) by mouth once daily.    ascorbic acid, vitamin C, (VITAMIN C) 500 mg Chew Take 1 tablet by mouth 3 (three) times daily.    atorvastatin (LIPITOR) 80 MG tablet TAKE ONE  TABLET BY MOUTH EVERY DAY. (Patient taking differently: TAKE ONE TABLET BY MOUTH Nightly)    BUTALBITAL/ASPIRIN/CAFFEINE (FIORINAL ORAL) Take by mouth as needed.    DOC-Q-LACE 100 mg capsule TAKE ONE CAPSULE BY MOUTH THREE TIMES A DAY AS NEEDED FOR CONSTIPATION    FLUoxetine (PROZAC) 20 MG capsule Take 3 capsules (60 mg total) by mouth once daily.    hydrocodone-acetaminophen 10-325mg (NORCO)  mg Tab Take 2 tablets by mouth every 4 (four) hours as needed for Pain.     levothyroxine (SYNTHROID) 112 MCG tablet Take 1 tablet (112 mcg total) by mouth before breakfast.    methenamine (HIPREX) 1 gram Tab Take 1 tablet (1 g total) by mouth 2 (two) times daily.    nitrofurantoin, macrocrystal-monohydrate, (MACROBID) 100 MG capsule Take 1 capsule (100 mg total) by mouth 2 (two) times daily.    oxybutynin (DITROPAN XL) 15 MG TR24 Take 1 tablet (15 mg total) by mouth once daily.    pantoprazole (PROTONIX) 40 MG tablet TAKE ONE TABLET BY MOUTH EVERY DAY.    potassium citrate (UROCIT-K) 5 mEq (540 mg) TbSR Take 1 tablet (5 mEq total) by mouth 3 (three) times daily with meals.    SENNOSIDES (SENNA LAX ORAL) Take 20 mg by mouth every evening.     trazodone (DESYREL) 50 MG tablet Take 3 tablets (150 mg total) by mouth every evening.    [DISCONTINUED] hydrOXYzine pamoate (VISTARIL) 25 MG Cap Take 1 capsule (25 mg total) by mouth nightly as needed (insomnia).    [DISCONTINUED] lamotrigine (LAMICTAL) 25 MG tablet Take 1 tablet (25 mg total) by mouth once daily.    [DISCONTINUED] ondansetron (ZOFRAN) 8 MG tablet Take 1 tablet (8 mg total) by mouth every 12 (twelve) hours as needed for Nausea.     Family History     Problem Relation (Age of Onset)    Cancer Mother (55), Father    Esophageal cancer Father    Heart disease Maternal Uncle    No Known Problems Brother, Brother, Sister, Maternal Aunt, Paternal Aunt, Paternal Uncle, Maternal Grandfather, Paternal Grandmother, Paternal Grandfather    Parkinsonism Maternal  Grandmother    Tremor Maternal Grandmother        Social History Main Topics    Smoking status: Never Smoker    Smokeless tobacco: Never Used    Alcohol use No      Comment: denies    Drug use: No    Sexual activity: No     Review of Systems   Constitutional: Negative for chills, diaphoresis and fever.   HENT: Negative for congestion, trouble swallowing and voice change.    Eyes: Negative for photophobia, pain and visual disturbance.   Respiratory: Negative for cough, shortness of breath and wheezing.    Cardiovascular: Positive for leg swelling. Negative for chest pain and palpitations.   Gastrointestinal: Positive for constipation. Negative for abdominal pain, nausea and vomiting.   Endocrine: Negative for cold intolerance and heat intolerance.   Genitourinary: Positive for dysuria. Negative for frequency and urgency.        Has suprapubic catheter with tenderness at site; also passing urine through urethral meatus.   Musculoskeletal: Positive for gait problem and myalgias. Negative for arthralgias.   Skin: Positive for color change. Negative for pallor and rash.   Neurological: Positive for weakness. Negative for dizziness and headaches.   Hematological: Does not bruise/bleed easily.   Psychiatric/Behavioral: Negative for agitation, confusion and hallucinations.     Objective:     Vital Signs (Most Recent):  Temp: 96.7 °F (35.9 °C) (12/28/17 2100)  Pulse: (!) 56 (12/28/17 2100)  Resp: 17 (12/28/17 2100)  BP: (!) 91/42 (12/28/17 2100)  SpO2: 99 % (12/28/17 2100) Vital Signs (24h Range):  Temp:  [96.7 °F (35.9 °C)-97.8 °F (36.6 °C)] 96.7 °F (35.9 °C)  Pulse:  [51-63] 56  Resp:  [15-17] 17  SpO2:  [96 %-99 %] 99 %  BP: ()/(42-82) 91/42     Weight: 86.4 kg (190 lb 8 oz)  Body mass index is 32.7 kg/m².    Physical Exam   Constitutional: She is oriented to person, place, and time. She appears well-developed and well-nourished. No distress.   HENT:   Head: Normocephalic and atraumatic.   Mouth/Throat:  Oropharynx is clear and moist.   Eyes: EOM are normal. Pupils are equal, round, and reactive to light. No scleral icterus.   glasses   Neck: Normal range of motion. Neck supple. No JVD present.   Cardiovascular: Normal rate and regular rhythm.    Pulses:       Radial pulses are 2+ on the right side, and 2+ on the left side.        Dorsalis pedis pulses are 1+ on the right side, and 1+ on the left side.        Posterior tibial pulses are 1+ on the right side, and 1+ on the left side.   Pulmonary/Chest: Effort normal and breath sounds normal. No respiratory distress. She has no wheezes.   Abdominal: Soft. Bowel sounds are normal. She exhibits no distension. There is tenderness in the suprapubic area.       Tenderness to suprapubic catheter insertion site.   Genitourinary:   Genitourinary Comments: Suprapubic catheter draining clear, yellow urine.   Musculoskeletal: Normal range of motion. She exhibits edema and tenderness.   3+ to 4+ pitting edema to bilateral lower extremities.   Neurological: She is alert and oriented to person, place, and time. She has normal strength. GCS eye subscore is 4. GCS verbal subscore is 5. GCS motor subscore is 6.   Skin: Skin is warm and dry. There is erythema. No cyanosis. Nails show no clubbing.        Psychiatric: She has a normal mood and affect. Her speech is normal and behavior is normal.   Nursing note and vitals reviewed.        CRANIAL NERVES     CN III, IV, VI   Pupils are equal, round, and reactive to light.  Extraocular motions are normal.            Significant Labs:   CBC:   Recent Labs  Lab 12/28/17  1524   WBC 5.18   HGB 10.0*   HCT 33.1*        CMP:   Recent Labs  Lab 12/28/17  1524      K 4.5      CO2 32*   GLU 94   BUN 8   CREATININE 0.8   CALCIUM 9.2   PROT 7.0   ALBUMIN 3.6   BILITOT 0.3   ALKPHOS 104   AST 14   ALT 12   ANIONGAP 6*   EGFRNONAA >60     Cardiac Markers:   Recent Labs  Lab 12/28/17  1524   BNP 74     Urine Culture: No results for  "input(s): LABURIN in the last 48 hours.  Urine Studies:   Recent Labs  Lab 12/28/17  1524   COLORU Yellow   APPEARANCEUA Hazy*   PHUR >8.0*   SPECGRAV 1.015   PROTEINUA 1+*   GLUCUA Negative   KETONESU Negative   BILIRUBINUA Negative   OCCULTUA 3+*   NITRITE Positive*   UROBILINOGEN Negative   LEUKOCYTESUR 2+*   RBCUA >100*   WBCUA 10*   BACTERIA Many*   SQUAMEPITHEL 0   HYALINECASTS 0     Urine culture [[id number]]  Collected: 12/26/17 1554   Order Status: Completed Specimen: Urine from Urine, Catheterized Updated: 12/28/17 1726    Urine Culture, Routine --    PROTEUS MIRABILIS   >100,000 cfu/ml   No other significant isolate    Susceptibility      Proteus mirabilis     CULTURE, URINE     Amox/K Clav'ate <=8/4 "><=8/4  Sensitive     Amp/Sulbactam <=8/4 "><=8/4  Sensitive     Ampicillin <=8 "><=8  Sensitive     Cefazolin <=8 "><=8  Sensitive     Cefepime <=8 "><=8  Sensitive     Ceftriaxone <=8 "><=8  Sensitive     Ciprofloxacin >2  Resistant     Ertapenem <=2 "><=2  Sensitive     Gentamicin <=4 "><=4  Sensitive     Meropenem <=4 "><=4  Sensitive     Piperacillin/Tazo <=16 "><=16  Sensitive     Tobramycin <=4 "><=4  Sensitive     Trimeth/Sulfa >2/38  Resistant              All pertinent labs within the past 24 hours have been reviewed.    Significant Imaging: I have reviewed all pertinent imaging results/findings within the past 24 hours.     X-Ray Chest 1 View: No evidence acute cardiac or pulmonary disease.  "

## 2017-12-29 NOTE — ASSESSMENT & PLAN NOTE
Narcotic dependency, continuous  Degenerative disc disease, lumbar  Osteoarthritis of spine with radiculopathy, lumbar region  Chronic.  Has implanted programmable dilaudid pump.  Takes hydrocodone 10/325 q4h at home in addition to continuous dilaudid. PRN pain medication.  Continue to monitor.

## 2017-12-29 NOTE — ASSESSMENT & PLAN NOTE
Chronic.  No acute abnormalities or pulmonary congestion on CXR.  BNP 74.  Daily weights.  Accurate I&Os.

## 2018-01-03 ENCOUNTER — OUTPATIENT CASE MANAGEMENT (OUTPATIENT)
Dept: ADMINISTRATIVE | Facility: OTHER | Age: 63
End: 2018-01-03

## 2018-01-03 ENCOUNTER — OFFICE VISIT (OUTPATIENT)
Dept: PSYCHIATRY | Facility: CLINIC | Age: 63
End: 2018-01-03
Payer: MEDICARE

## 2018-01-03 DIAGNOSIS — F33.0 MAJOR DEPRESSIVE DISORDER, RECURRENT, MILD: Primary | ICD-10-CM

## 2018-01-03 DIAGNOSIS — R45.4 IRRITABILITY AND ANGER: ICD-10-CM

## 2018-01-03 DIAGNOSIS — F41.1 GENERALIZED ANXIETY DISORDER: ICD-10-CM

## 2018-01-03 PROCEDURE — 99999 PR PBB SHADOW E&M-EST. PATIENT-LVL I: CPT | Mod: PBBFAC,,, | Performed by: PSYCHOLOGIST

## 2018-01-03 PROCEDURE — 90791 PSYCH DIAGNOSTIC EVALUATION: CPT | Mod: S$GLB,,, | Performed by: PSYCHOLOGIST

## 2018-01-03 NOTE — PROGRESS NOTES
Telephonic follow-up with patient's daughter, Lisa Thompson.  Lisa said patient is doing well.  She said patient is compliant with appointments and medication.  Lisa said she has not pursued any more support programs for patient because patient is declining assistance. She did, however, request information for her 64 year-old father who is uninsured, unemployed, and and living with Lisa to convalesce after a hospital stay.  Lisa was encouraged to contact the [facility] so [facility] could have [facility].  She said she had the information on the program and would call them.  Additional information provided: [employer], community clinics, and medication assistance programs. Lisa requested that the information for her father be e-mailed to her at [email or web address]. Information e-mailed.  Discharge discussed.  Lisa said she will call if additional support is needed for patient. Case closed.  OPCM RN notified.

## 2018-01-03 NOTE — PROGRESS NOTES
Received message from DAVID Joseph, OPCM SW that SW closed patient's case. OPCM SW addressed all social needs. This RN will dis-enroll patient form OPCM services at this time. LULI Marshall, OPCM-RN
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
## 2018-01-08 ENCOUNTER — TELEPHONE (OUTPATIENT)
Dept: UROLOGY | Facility: CLINIC | Age: 63
End: 2018-01-08

## 2018-01-08 NOTE — TELEPHONE ENCOUNTER
Pt called requesting order be sent to [facility]  for s/p tube change in lieu of keeping clinic appt tomorrow for change. Spoke with Karine at  agency who stated siliver coated silicone cath would be ordered for pt and s/p tube would be changed on 1/10/2017. Order written per [facility] Workman and faxed to  nurse Sammi at [phone].

## 2018-01-09 ENCOUNTER — OUTPATIENT CASE MANAGEMENT (OUTPATIENT)
Dept: ADMINISTRATIVE | Facility: OTHER | Age: 63
End: 2018-01-09

## 2018-01-09 NOTE — PROGRESS NOTES
Please note this patient was mailed an Outpatient Care Management Patient Satisfaction Discharge Survey on 1/9/18.    Please contact [facility] at [phone] with any questions.    Thank you,    Jasmin Mccauley, SSC

## 2018-01-10 ENCOUNTER — TELEPHONE (OUTPATIENT)
Dept: PSYCHIATRY | Facility: CLINIC | Age: 63
End: 2018-01-10

## 2018-01-10 NOTE — TELEPHONE ENCOUNTER
"Ms. Hawley called to cancel her appointment at 2:00 pm due to illness.  I called her back to reschedule and she sounded distressed and requested to check-in.  She had a dispute with her  and felt angry and sad about their interaction.  She asked him to leave and she started ruminating on past negative interactions.  She was encouraged to use this as a "time out" in order to reduce the intensity of her distress and calm down.  She was reminded that it is difficult to problem solve when we are distressed, and that it is unhelpful to ruminate on past events we cannot change.  She mentioned that they are doing the assignment we discussed in session, and looks forward to learning skills in therapy.  She will be rescheduled for 1/15/17 at 11 am.  "

## 2018-01-15 ENCOUNTER — TELEPHONE (OUTPATIENT)
Dept: PSYCHIATRY | Facility: CLINIC | Age: 63
End: 2018-01-15

## 2018-01-15 RX ORDER — LEVOTHYROXINE SODIUM 125 UG/1
TABLET ORAL
Qty: 90 TABLET | Refills: 3 | Status: SHIPPED | OUTPATIENT
Start: 2018-01-15 | End: 2018-02-01

## 2018-01-15 NOTE — TELEPHONE ENCOUNTER
Ms. Hawley called to apologize for missing her appointment, and reported that she is still sick.  She was reminded that this is her second late cancellation/no-show, and encouraged to wait until she feels better to make her next appointment so she can ensure she can commit to it.  She understands that she may not be able to reschedule an appointment if she continues to have multiple late cancellations and no-shows.

## 2018-01-16 ENCOUNTER — OFFICE VISIT (OUTPATIENT)
Dept: INTERNAL MEDICINE | Facility: CLINIC | Age: 63
End: 2018-01-16
Payer: MEDICARE

## 2018-01-16 ENCOUNTER — TELEPHONE (OUTPATIENT)
Dept: INTERNAL MEDICINE | Facility: CLINIC | Age: 63
End: 2018-01-16

## 2018-01-16 VITALS
SYSTOLIC BLOOD PRESSURE: 112 MMHG | HEIGHT: 64 IN | HEART RATE: 49 BPM | OXYGEN SATURATION: 99 % | DIASTOLIC BLOOD PRESSURE: 64 MMHG | TEMPERATURE: 98 F

## 2018-01-16 DIAGNOSIS — R33.9 URINARY RETENTION: Chronic | ICD-10-CM

## 2018-01-16 DIAGNOSIS — I89.0 LYMPHEDEMA OF BOTH LOWER EXTREMITIES: Chronic | ICD-10-CM

## 2018-01-16 DIAGNOSIS — H66.92 LEFT OTITIS MEDIA, UNSPECIFIED OTITIS MEDIA TYPE: ICD-10-CM

## 2018-01-16 DIAGNOSIS — T83.510D URINARY TRACT INFECTION ASSOCIATED WITH CYSTOSTOMY CATHETER, SUBSEQUENT ENCOUNTER: ICD-10-CM

## 2018-01-16 DIAGNOSIS — N39.0 URINARY TRACT INFECTION ASSOCIATED WITH CYSTOSTOMY CATHETER, SUBSEQUENT ENCOUNTER: ICD-10-CM

## 2018-01-16 DIAGNOSIS — R68.89 FLU-LIKE SYMPTOMS: Primary | ICD-10-CM

## 2018-01-16 PROCEDURE — 99214 OFFICE O/P EST MOD 30 MIN: CPT | Mod: S$GLB,,, | Performed by: NURSE PRACTITIONER

## 2018-01-16 PROCEDURE — 99499 UNLISTED E&M SERVICE: CPT | Mod: S$GLB,,, | Performed by: NURSE PRACTITIONER

## 2018-01-16 PROCEDURE — 99999 PR PBB SHADOW E&M-EST. PATIENT-LVL V: CPT | Mod: PBBFAC,,, | Performed by: NURSE PRACTITIONER

## 2018-01-16 RX ORDER — DOXYCYCLINE 100 MG/1
100 CAPSULE ORAL EVERY 12 HOURS
Qty: 14 CAPSULE | Refills: 0 | Status: SHIPPED | OUTPATIENT
Start: 2018-01-16 | End: 2018-02-01

## 2018-01-16 NOTE — PROGRESS NOTES
"INTERNAL MEDICINE PROGRESS/URGENT CARE NOTE    CHIEF COMPLAINT     Chief Complaint   Patient presents with    Diarrhea     no appetite    Fatigue     began on saturday        HPI     Tasha Hawley is a 62 y.o. female who presents for an urgent visit today.  She is an established pt of Dr. Hodges .     Here with c/o diarrhea, loss of appetite, fatigue and body aches and chills x 3 days. Treating with imodium yesterday and diarrhea has stopped today.   +runny nose  +ear pain and fullness  +sore throat  +body aches.     Held lasix x 3 days when she stopped drinking and eating. States swelling is not worse.     States EMS came to e last night "bc I felt so bad". VS WNL. Decided to not go to the ER.     Past Medical History:  Past Medical History:   Diagnosis Date    Anxiety     Arthritis     Bilateral lower extremity edema     severe chronic    Carotid artery occlusion     Cataract     Depression     Fever blister     Hypothyroid     Iron deficiency anemia     Lumbar radiculopathy     with chronic pain    Ocular migraine     Sleep apnea     cpap       Home Medications:  Prior to Admission medications    Medication Sig Start Date End Date Taking? Authorizing Provider   ascorbic acid, vitamin C, (VITAMIN C) 500 mg Chew Take 1 tablet by mouth 3 (three) times daily. 9/22/17  Yes Shireen Mayo MD   atorvastatin (LIPITOR) 80 MG tablet TAKE ONE TABLET BY MOUTH EVERY DAY.  Patient taking differently: TAKE ONE TABLET BY MOUTH Nightly 12/4/17  Yes Mesfin Hodges II, MD   BUTALBITAL/ASPIRIN/CAFFEINE (FIORINAL ORAL) Take by mouth as needed.   Yes Historical Provider, MD   DOC-Q-LACE 100 mg capsule TAKE ONE CAPSULE BY MOUTH THREE TIMES A DAY AS NEEDED FOR CONSTIPATION 1/8/18  Yes Mesfin Hodges II, MD   FLUoxetine (PROZAC) 20 MG capsule Take 3 capsules (60 mg total) by mouth once daily. 11/20/17 11/20/18 Yes Erik Oconnor MD   furosemide (LASIX) 40 MG tablet Take 1 tablet (40 mg total) by mouth 2 " (two) times daily. 12/29/17 12/29/18 Yes Isaias Anderson MD   hydrocodone-acetaminophen 10-325mg (NORCO)  mg Tab Take 2 tablets by mouth every 4 (four) hours as needed for Pain.    Yes Historical Provider, MD   levothyroxine (SYNTHROID) 112 MCG tablet Take 1 tablet (112 mcg total) by mouth before breakfast. 5/18/17  Yes Mesfin Hodges II, MD   levothyroxine (SYNTHROID) 125 MCG tablet TAKE ONE TABLET BY MOUTH EVERY DAY. 1/15/18  Yes Mesfin Hodges II, MD   melatonin 10 mg Tab Take 10 mg by mouth every evening.   Yes Historical Provider, MD   methenamine (HIPREX) 1 gram Tab Take 1 tablet (1 g total) by mouth 2 (two) times daily. 9/29/17  Yes Shireen Mayo MD   oxybutynin (DITROPAN XL) 15 MG TR24 Take 1 tablet (15 mg total) by mouth once daily. 6/12/17  Yes Shireen Mayo MD   pantoprazole (PROTONIX) 40 MG tablet TAKE ONE TABLET BY MOUTH EVERY DAY. 10/29/17  Yes Mesfin Hodges II, MD   potassium chloride SA (K-DUR,KLOR-CON) 20 MEQ tablet Take 2 tablets (40 mEq total) by mouth 2 (two) times daily. 12/29/17  Yes Isaias Anderson MD   potassium citrate (UROCIT-K) 5 mEq (540 mg) TbSR Take 1 tablet (5 mEq total) by mouth 3 (three) times daily with meals. 11/30/17 11/30/18 Yes Shireen Mayo MD   ranitidine (ZANTAC) 150 MG capsule Take 1 capsule (150 mg total) by mouth 2 (two) times daily. 12/29/17  Yes Isaias Anderson MD   SENNOSIDES (SENNA LAX ORAL) Take 20 mg by mouth every evening.    Yes Historical Provider, MD   trazodone (DESYREL) 50 MG tablet Take 3 tablets (150 mg total) by mouth every evening. 10/11/17 10/11/18 Yes Erik Oconnor MD   aspirin (ECOTRIN) 81 MG EC tablet Take 1 tablet (81 mg total) by mouth once daily. 1/7/16 12/28/17  Tomer Douglas MD   lamotrigine (LAMICTAL) 25 MG tablet Take 1 tablet (25 mg total) by mouth once daily. 5/3/13 3/23/17  Kimberley Chandra MD       Review of Systems:  Review of Systems   Constitutional: Positive for chills, fatigue and fever.  "  HENT: Positive for congestion, ear pain, rhinorrhea, sinus pain, sinus pressure and sore throat. Negative for sneezing, tinnitus and voice change.    Eyes: Negative for visual disturbance.   Respiratory: Positive for cough. Negative for shortness of breath and wheezing.    Cardiovascular: Negative for chest pain and palpitations.   Gastrointestinal: Positive for diarrhea. Negative for abdominal pain, nausea and vomiting.   Neurological: Positive for weakness. Negative for dizziness, light-headedness and headaches.       Health Maintainence:   Immunizations:  Health Maintenance       Date Due Completion Date    Zoster Vaccine 08/25/2016 ---    Lipid Panel 08/31/2018 8/31/2017    Mammogram 11/30/2018 11/30/2017 (Declined)    Override on 11/30/2017: Declined    Override on 11/28/2016: Declined (per md note)    Fecal Occult Blood Test (FOBT)/[employer] 12/06/2018 12/6/2017    TETANUS VACCINE 03/02/2027 3/2/2017           PHYSICAL EXAM     /64 (BP Location: Left arm, Patient Position: Sitting, BP Method: Large (Manual))   Pulse (!) 49   Temp 97.8 °F (36.6 °C) (Oral)   Ht 5' 4" (1.626 m)   LMP  (LMP Unknown)   SpO2 99%     Physical Exam   Constitutional: She is oriented to person, place, and time. She appears well-developed and well-nourished.   HENT:   Head: Normocephalic.   Right Ear: External ear normal. Tympanic membrane is erythematous and bulging.   Left Ear: External ear normal. Tympanic membrane is erythematous and bulging.   Nose: Mucosal edema and rhinorrhea present. Right sinus exhibits maxillary sinus tenderness and frontal sinus tenderness. Left sinus exhibits maxillary sinus tenderness and frontal sinus tenderness.   Mouth/Throat: Uvula is midline and oropharynx is clear and moist. No oropharyngeal exudate.       Eyes: Pupils are equal, round, and reactive to light.   Neck: Neck supple. No JVD present. No tracheal deviation present. No thyromegaly present.   Cardiovascular: Normal rate, regular " rhythm, normal heart sounds and intact distal pulses.  Exam reveals no gallop and no friction rub.    No murmur heard.  Pulmonary/Chest: Effort normal and breath sounds normal. No respiratory distress. She has no wheezes. She has no rales.   Abdominal: Soft. Bowel sounds are normal. She exhibits no distension. There is tenderness in the suprapubic area.       Musculoskeletal: Normal range of motion. She exhibits edema. She exhibits no tenderness.   Lymphadenopathy:     She has no cervical adenopathy.   Neurological: She is alert and oriented to person, place, and time.   Skin: Skin is warm and dry. No rash noted.   Psychiatric: She has a normal mood and affect. Her behavior is normal.   Vitals reviewed.      LABS     Lab Results   Component Value Date    HGBA1C 5.4 08/25/2016     CMP  Sodium   Date Value Ref Range Status   12/29/2017 143 136 - 145 mmol/L Final     Potassium   Date Value Ref Range Status   12/29/2017 3.5 3.5 - 5.1 mmol/L Final     Chloride   Date Value Ref Range Status   12/29/2017 101 95 - 110 mmol/L Final     CO2   Date Value Ref Range Status   12/29/2017 37 (H) 23 - 29 mmol/L Final     Glucose   Date Value Ref Range Status   12/29/2017 90 70 - 110 mg/dL Final     BUN, Bld   Date Value Ref Range Status   12/29/2017 8 8 - 23 mg/dL Final     Creatinine   Date Value Ref Range Status   12/29/2017 0.7 0.5 - 1.4 mg/dL Final     Calcium   Date Value Ref Range Status   12/29/2017 8.7 8.7 - 10.5 mg/dL Final     Total Protein   Date Value Ref Range Status   12/28/2017 7.0 6.0 - 8.4 g/dL Final     Albumin   Date Value Ref Range Status   12/28/2017 3.6 3.5 - 5.2 g/dL Final     Total Bilirubin   Date Value Ref Range Status   12/28/2017 0.3 0.1 - 1.0 mg/dL Final     Comment:     For infants and newborns, interpretation of results should be based  on gestational age, weight and in agreement with clinical  observations.  Premature Infant recommended reference ranges:  Up to 24 hours.............<8.0 mg/dL  Up to  48 hours............<12.0 mg/dL  3-5 days..................<15.0 mg/dL  6-29 days.................<15.0 mg/dL       Alkaline Phosphatase   Date Value Ref Range Status   12/28/2017 104 55 - 135 U/L Final     AST   Date Value Ref Range Status   12/28/2017 14 10 - 40 U/L Final     ALT   Date Value Ref Range Status   12/28/2017 12 10 - 44 U/L Final     Anion Gap   Date Value Ref Range Status   12/29/2017 5 (L) 8 - 16 mmol/L Final     eGFR if    Date Value Ref Range Status   12/29/2017 >60 >60 mL/min/1.73 m^2 Final     eGFR if non    Date Value Ref Range Status   12/29/2017 >60 >60 mL/min/1.73 m^2 Final     Comment:     Calculation used to obtain the estimated glomerular filtration  rate (eGFR) is the CKD-EPI equation.        Lab Results   Component Value Date    WBC 5.18 12/28/2017    HGB 10.0 (L) 12/28/2017    HCT 33.1 (L) 12/28/2017    MCV 90 12/28/2017     12/28/2017     Lab Results   Component Value Date    CHOL 111 (L) 08/31/2017    CHOL 108 (L) 03/15/2016    CHOL 191 01/07/2016     Lab Results   Component Value Date    HDL 42 08/31/2017    HDL 49 03/15/2016    HDL 80 (H) 01/07/2016     Lab Results   Component Value Date    LDLCALC 56.2 (L) 08/31/2017    LDLCALC 46.4 (L) 03/15/2016    LDLCALC 104.2 01/07/2016     Lab Results   Component Value Date    TRIG 64 08/31/2017    TRIG 63 03/15/2016    TRIG 34 01/07/2016     Lab Results   Component Value Date    CHOLHDL 37.8 08/31/2017    CHOLHDL 45.4 03/15/2016    CHOLHDL 41.9 01/07/2016     Lab Results   Component Value Date    TSH 5.063 (H) 08/20/2017       ASSESSMENT/PLAN     Tasha LEE Chandan is a 62 y.o. female with  Past Medical History:   Diagnosis Date    Anxiety     Arthritis     Bilateral lower extremity edema     severe chronic    Carotid artery occlusion     Cataract     Depression     Fever blister     Hypothyroid     Iron deficiency anemia     Lumbar radiculopathy     with chronic pain    Ocular migraine      Sleep apnea     cpap     Flu-like symptoms- flu swab negative.   -     POCT Influenza A/B    Left otitis media, unspecified otitis media type- will start doxycycline bid x 1 week. Mucinex bid as needed   -     doxycycline (MONODOX) 100 MG capsule; Take 1 capsule (100 mg total) by mouth every 12 (twelve) hours.  Dispense: 14 capsule; Refill: 0    Urinary retention- catheter in place. Increase fluids.     Urinary tract infection associated with cystostomy catheter, subsequent encounter    Lymphedema of both lower extremities- controlled. Elevate legs. Cont lasix as directed. K+ film coated called in           Follow up with PCP    Patient education provided from Martha. Patient was counseled on when and how to seek emergent care.       Cat PALENCIA, DIAN, FNP-c   Department of Internal Medicine - [facility]  3:28 PM

## 2018-01-18 ENCOUNTER — OUTPATIENT CASE MANAGEMENT (OUTPATIENT)
Dept: ADMINISTRATIVE | Facility: OTHER | Age: 63
End: 2018-01-18

## 2018-01-18 ENCOUNTER — PES CALL (OUTPATIENT)
Dept: ADMINISTRATIVE | Facility: CLINIC | Age: 63
End: 2018-01-18

## 2018-01-18 NOTE — PROGRESS NOTES
The following patient has been assigned to Fannie Marshall RN with Outpatient Complex Care Management for high risk screening.    Reason: High Risk    Please contact \[facility]\"" at [phone] with any questions.    Thank you,    Raegan Can    Outpatient Care Management

## 2018-01-19 ENCOUNTER — OUTPATIENT CASE MANAGEMENT (OUTPATIENT)
Dept: ADMINISTRATIVE | Facility: OTHER | Age: 63
End: 2018-01-19

## 2018-01-19 NOTE — PROGRESS NOTES
Talked to Lisa Thompson at phone number of [phone].  Lisa stated that at this time all of her mother's needs are met and has declined OPCM services at this time.  Encouraged Lisa/patient to call this RN if having any questions/concerns.  Will dis-enroll patient.  LULI Marshall, OPCM-RN

## 2018-01-24 ENCOUNTER — TELEPHONE (OUTPATIENT)
Dept: PSYCHIATRY | Facility: CLINIC | Age: 63
End: 2018-01-24

## 2018-01-24 NOTE — TELEPHONE ENCOUNTER
Ms. Hawley called to say that she is ready to reschedule an appointment and commit to therapy.  She will be scheduled for 2/5/18 at 1 pm.  She was reminded to make sure she can come to this appointment to do previous no-shows, and she was in agreement.

## 2018-01-29 ENCOUNTER — OFFICE VISIT (OUTPATIENT)
Dept: UROLOGY | Facility: CLINIC | Age: 63
End: 2018-01-29
Payer: MEDICARE

## 2018-01-29 VITALS
DIASTOLIC BLOOD PRESSURE: 48 MMHG | WEIGHT: 155.44 LBS | BODY MASS INDEX: 26.68 KG/M2 | HEART RATE: 66 BPM | SYSTOLIC BLOOD PRESSURE: 109 MMHG

## 2018-01-29 DIAGNOSIS — R39.89 BLADDER PAIN: Primary | ICD-10-CM

## 2018-01-29 DIAGNOSIS — R11.10 VOMITING, INTRACTABILITY OF VOMITING NOT SPECIFIED, PRESENCE OF NAUSEA NOT SPECIFIED, UNSPECIFIED VOMITING TYPE: ICD-10-CM

## 2018-01-29 DIAGNOSIS — N31.9 NEUROGENIC BLADDER: Chronic | ICD-10-CM

## 2018-01-29 DIAGNOSIS — R11.0 NAUSEA: Primary | ICD-10-CM

## 2018-01-29 DIAGNOSIS — Z46.6 URINARY CATHETER (FOLEY) CHANGE REQUIRED: ICD-10-CM

## 2018-01-29 PROCEDURE — 87077 CULTURE AEROBIC IDENTIFY: CPT

## 2018-01-29 PROCEDURE — 87186 SC STD MICRODIL/AGAR DIL: CPT

## 2018-01-29 PROCEDURE — 99999 PR PBB SHADOW E&M-EST. PATIENT-LVL V: CPT | Mod: PBBFAC,,, | Performed by: PHYSICIAN ASSISTANT

## 2018-01-29 PROCEDURE — 87086 URINE CULTURE/COLONY COUNT: CPT

## 2018-01-29 PROCEDURE — 99213 OFFICE O/P EST LOW 20 MIN: CPT | Mod: S$GLB,,, | Performed by: PHYSICIAN ASSISTANT

## 2018-01-29 PROCEDURE — 87088 URINE BACTERIA CULTURE: CPT

## 2018-01-29 RX ORDER — ONDANSETRON HYDROCHLORIDE 8 MG/1
TABLET, FILM COATED ORAL
Qty: 60 TABLET | Refills: 2 | Status: SHIPPED | OUTPATIENT
Start: 2018-01-29 | End: 2019-01-11

## 2018-01-30 ENCOUNTER — OFFICE VISIT (OUTPATIENT)
Dept: INTERNAL MEDICINE | Facility: CLINIC | Age: 63
End: 2018-01-30
Payer: MEDICARE

## 2018-01-30 ENCOUNTER — HOSPITAL ENCOUNTER (OUTPATIENT)
Dept: RADIOLOGY | Facility: HOSPITAL | Age: 63
Discharge: HOME | End: 2018-01-30
Attending: NURSE PRACTITIONER
Payer: MEDICARE

## 2018-01-30 VITALS
HEIGHT: 64 IN | HEART RATE: 54 BPM | OXYGEN SATURATION: 98 % | SYSTOLIC BLOOD PRESSURE: 110 MMHG | DIASTOLIC BLOOD PRESSURE: 70 MMHG | BODY MASS INDEX: 26.68 KG/M2

## 2018-01-30 DIAGNOSIS — N39.0 URINARY TRACT INFECTION ASSOCIATED WITH CYSTOSTOMY CATHETER, SUBSEQUENT ENCOUNTER: ICD-10-CM

## 2018-01-30 DIAGNOSIS — R10.9 ABDOMINAL PAIN, UNSPECIFIED ABDOMINAL LOCATION: Primary | ICD-10-CM

## 2018-01-30 DIAGNOSIS — T83.510D URINARY TRACT INFECTION ASSOCIATED WITH CYSTOSTOMY CATHETER, SUBSEQUENT ENCOUNTER: ICD-10-CM

## 2018-01-30 DIAGNOSIS — R10.84 GENERALIZED ABDOMINAL PAIN: ICD-10-CM

## 2018-01-30 DIAGNOSIS — R11.2 NAUSEA VOMITING AND DIARRHEA: ICD-10-CM

## 2018-01-30 DIAGNOSIS — R19.7 NAUSEA VOMITING AND DIARRHEA: ICD-10-CM

## 2018-01-30 DIAGNOSIS — R33.9 URINARY RETENTION: Chronic | ICD-10-CM

## 2018-01-30 DIAGNOSIS — N31.9 NEUROGENIC BLADDER: Chronic | ICD-10-CM

## 2018-01-30 DIAGNOSIS — R10.9 ABDOMINAL PAIN, UNSPECIFIED ABDOMINAL LOCATION: ICD-10-CM

## 2018-01-30 PROCEDURE — 74177 CT ABD & PELVIS W/CONTRAST: CPT | Mod: 26,,, | Performed by: RADIOLOGY

## 2018-01-30 PROCEDURE — 99999 PR PBB SHADOW E&M-EST. PATIENT-LVL V: CPT | Mod: PBBFAC,,, | Performed by: NURSE PRACTITIONER

## 2018-01-30 PROCEDURE — 99499 UNLISTED E&M SERVICE: CPT | Mod: S$GLB,,, | Performed by: NURSE PRACTITIONER

## 2018-01-30 PROCEDURE — 74177 CT ABD & PELVIS W/CONTRAST: CPT | Mod: TC

## 2018-01-30 PROCEDURE — 99214 OFFICE O/P EST MOD 30 MIN: CPT | Mod: S$GLB,,, | Performed by: NURSE PRACTITIONER

## 2018-01-30 PROCEDURE — 3008F BODY MASS INDEX DOCD: CPT | Mod: S$GLB,,, | Performed by: NURSE PRACTITIONER

## 2018-01-30 PROCEDURE — 25500020 PHARM REV CODE 255: Performed by: NURSE PRACTITIONER

## 2018-01-30 RX ORDER — HYDROXYZINE HYDROCHLORIDE 25 MG/1
25 TABLET, FILM COATED ORAL 3 TIMES DAILY PRN
Qty: 30 TABLET | Refills: 1 | Status: SHIPPED | OUTPATIENT
Start: 2018-01-30 | End: 2018-05-17

## 2018-01-30 RX ADMIN — IOHEXOL 15 ML: 350 INJECTION, SOLUTION INTRAVENOUS at 02:01

## 2018-01-30 RX ADMIN — IOHEXOL 75 ML: 350 INJECTION, SOLUTION INTRAVENOUS at 03:01

## 2018-01-30 NOTE — PROGRESS NOTES
CHIEF COMPLAINT:    Mrs. Hawley is a 62 y.o. female presenting for malodorous urine.  PRESENTING ILLNESS:    Tasha Hawley is a 62 y.o. female with a PMH of urinary incontinence, neurogenic bladder who presents for malodorous urine and increased urine sediment.  She also reports bladder and abdominal pain.  Her catheter has been draining well.  However the last the few nights her urine has not been draining as well.  She tried to flush it and states that it came out of her urethra.  She reports dysuria.  Her catheter was last changed by a  nurse on 1/10.  She has some sensitivity and soreness at her catheter site.  She feels as if she may have an infection.  Her  states that she hasnt been herself lately.  She has a decrease in appetite.   Today she reports nausea.  She is scheduled to see her pcp this week.   She denies fever.      REVIEW OF SYSTEMS:  Constitutional: Negative for fever and chills.   HENT: Negative for hearing loss.   Eyes: Negative for visual disturbance.   Respiratory: Negative for shortness of breath.   Cardiovascular: Negative for chest pain.   Gastrointestinal: Positive for vomiting.   Genitourinary:  Positive for incomplete bladder emptying.   Neurological: Negative for dizziness.   Hematological: Does not bruise/bleed easily.   Psychiatric/Behavioral: Negative for confusion.     PATIENT HISTORY:    Past Medical History:   Diagnosis Date    Anxiety     Arthritis     Bilateral lower extremity edema     severe chronic    Carotid artery occlusion     Cataract     Depression     Fever blister     Hypothyroid     Iron deficiency anemia     Lumbar radiculopathy     with chronic pain    Ocular migraine     Sleep apnea     cpap       Past Surgical History:   Procedure Laterality Date    BACK SURGERY      x 12    CATARACT EXTRACTION W/  INTRAOCULAR LENS IMPLANT Left     Dr Coleman     HYSTERECTOMY  1975    endometriosis    pain pump placement      SQ Dilaudid Pump  managed by Dr. Hillman, Pain Management    SPINAL CORD STIMULATOR REMOVAL      before [place]    SPINE SURGERY  5-13-13    CERVICAL FUSION       Family History   Problem Relation Age of Onset    Cancer Mother 55     breast    Cancer Father      esophagus,had laryngectomy    Esophageal cancer Father     Parkinsonism Maternal Grandmother     Tremor Maternal Grandmother     No Known Problems Brother     No Known Problems Brother     Heart disease Maternal Uncle     No Known Problems Sister     No Known Problems Maternal Aunt     No Known Problems Paternal Aunt     No Known Problems Paternal Uncle     No Known Problems Maternal Grandfather     No Known Problems Paternal Grandmother     No Known Problems Paternal Grandfather     Melanoma Neg Hx     Amblyopia Neg Hx     Blindness Neg Hx     Cataracts Neg Hx     Diabetes Neg Hx     Glaucoma Neg Hx     Hypertension Neg Hx     Macular degeneration Neg Hx     Retinal detachment Neg Hx     Strabismus Neg Hx     Stroke Neg Hx     Thyroid disease Neg Hx     Colon cancer Neg Hx        Social History     Social History    Marital status:      Spouse name: N/A    Number of children: N/A    Years of education: N/A     Occupational History    Not on file.     Social History Main Topics    Smoking status: Never Smoker    Smokeless tobacco: Never Used    Alcohol use No      Comment: denies    Drug use: No    Sexual activity: No     Other Topics Concern    Not on file     Social History Narrative    No narrative on file       Allergies:  (d)-limonene flavor; Bactrim [sulfamethoxazole-trimethoprim]; Imitrex [sumatriptan succinate]; Penicillins; Topamax [topiramate]; Vancomycin; Butorphanol tartrate; Darvocet a500 [propoxyphene n-acetaminophen]; Fentanyl; White petrolatum-zinc; Zinc oxide-white petrolatum; and Latex, natural rubber    Medications:    Current Outpatient Prescriptions:     ascorbic acid, vitamin C, (VITAMIN C) 500 mg Chew,  Take 1 tablet by mouth 3 (three) times daily., Disp: 90 each, Rfl: 11    aspirin (ECOTRIN) 81 MG EC tablet, Take 1 tablet (81 mg total) by mouth once daily., Disp: , Rfl: 0    atorvastatin (LIPITOR) 80 MG tablet, TAKE ONE TABLET BY MOUTH EVERY DAY. (Patient taking differently: TAKE ONE TABLET BY MOUTH Nightly), Disp: 90 tablet, Rfl: 3    BUTALBITAL/ASPIRIN/CAFFEINE (FIORINAL ORAL), Take by mouth as needed., Disp: , Rfl:     DOC-Q-LACE 100 mg capsule, TAKE ONE CAPSULE BY MOUTH THREE TIMES A DAY AS NEEDED FOR CONSTIPATION, Disp: 90 capsule, Rfl: 3    doxycycline (MONODOX) 100 MG capsule, Take 1 capsule (100 mg total) by mouth every 12 (twelve) hours., Disp: 14 capsule, Rfl: 0    FLUoxetine (PROZAC) 20 MG capsule, Take 3 capsules (60 mg total) by mouth once daily., Disp: 90 capsule, Rfl: 2    furosemide (LASIX) 40 MG tablet, Take 1 tablet (40 mg total) by mouth 2 (two) times daily., Disp: 60 tablet, Rfl: 11    hydrocodone-acetaminophen 10-325mg (NORCO)  mg Tab, Take 2 tablets by mouth every 4 (four) hours as needed for Pain. , Disp: , Rfl:     levothyroxine (SYNTHROID) 112 MCG tablet, Take 1 tablet (112 mcg total) by mouth before breakfast., Disp: 90 tablet, Rfl: 3    levothyroxine (SYNTHROID) 125 MCG tablet, TAKE ONE TABLET BY MOUTH EVERY DAY., Disp: 90 tablet, Rfl: 3    melatonin 10 mg Tab, Take 10 mg by mouth every evening., Disp: , Rfl:     methenamine (HIPREX) 1 gram Tab, Take 1 tablet (1 g total) by mouth 2 (two) times daily., Disp: 60 tablet, Rfl: 11    ondansetron (ZOFRAN) 8 MG tablet, TAKE ONE TABLET BY MOUTH EVERY TWELVE HOURS AS NEEDED FOR NAUSEA, Disp: 60 tablet, Rfl: 2    oxybutynin (DITROPAN XL) 15 MG TR24, Take 1 tablet (15 mg total) by mouth once daily., Disp: 30 tablet, Rfl: 6    pantoprazole (PROTONIX) 40 MG tablet, TAKE ONE TABLET BY MOUTH EVERY DAY., Disp: 90 tablet, Rfl: 2    potassium chloride SA (K-DUR,KLOR-CON) 20 MEQ tablet, Take 2 tablets (40 mEq total) by mouth 2 (two)  times daily., Disp: 120 tablet, Rfl: 11    potassium citrate (UROCIT-K) 5 mEq (540 mg) TbSR, Take 1 tablet (5 mEq total) by mouth 3 (three) times daily with meals., Disp: 90 tablet, Rfl: 11    ranitidine (ZANTAC) 150 MG capsule, Take 1 capsule (150 mg total) by mouth 2 (two) times daily., Disp: 60 capsule, Rfl: 2    SENNOSIDES (SENNA LAX ORAL), Take 20 mg by mouth every evening. , Disp: , Rfl:     trazodone (DESYREL) 50 MG tablet, Take 3 tablets (150 mg total) by mouth every evening., Disp: 90 tablet, Rfl: 3    PHYSICAL EXAMINATION:  Constitutional: She appears well-developed and well-nourished.  She is in no apparent distress.       Eyes: No scleral icterus noted bilaterally.  No discharge noted bilaterally.    Cardiovascular: Normal rate.  No pitting edema noted in lower extremities bilaterally    Pulmonary/Chest: Effort normal. No respiratory distress.     Abdominal:  She exhibits no distension.           Lymphadenopathy:          Right: No supraclavicular adenopathy present.        Left: No supraclavicular adenopathy present.     Neurological: She is alert and oriented to person, place, and time.     Skin: Skin is warm and dry.     Psych: Cooperative with normal affect.    Physical Exam    Alonso urine noted draining into bag    LABS:    IMPRESSION:    Encounter Diagnoses   Name Primary?    Bladder pain Yes    Urinary catheter ([place]) change required     Vomiting, intractability of vomiting not specified, presence of nausea not specified, unspecified vomiting type     Neurogenic bladder        PLAN:  Urine culture  26cc sterile water was removed to deflate the balloon.   A 20fr silicone was removed.  A new 20 fr silver coated silicone catheter was placed.  30cc of sterile water was used to inflate the balloon.  120cc was used to irrigate the catheter.  The catheter was connected to the leg bag and secured to her leg.   Patient noted vomiting in clinic.  Recommend she follow up with her pcp.  She states  that she has an appointment this week.  Will call with culture results.  Continue monthly meadows catheter changes.      Bryanna Sultana PA-C

## 2018-01-30 NOTE — PROGRESS NOTES
INTERNAL MEDICINE URGENT CARE NOTE    CHIEF COMPLAINT     Chief Complaint   Patient presents with    Emesis     yesterday and this AM    Diarrhea       HPI     Tasha Hawley is a 62 y.o. female with chronic pain, OA, DDD of the lumbar spine, CAD, diastolic dysfunction, neurogenic bladder, WENDI, hypothyroidism, gerd, lymphedema of lower extremities, and insomnia who presents for an urgent visit today.  She is an established pt of Dr. Hodges     Here with c/o vomiting- reports episodes of vomiting yesterday and this morning. Was seen in urology clinic yesterday to have meadows catheter changed out, vomited in office. Urine sent for culture. In process.   Was able to tolerate omelet and water this morning. Last episode of vomiting 3am.    +itching   +chills; no fever.   +abd pain in the lower abd   +diarrhea  x 1 week. Denies blood in stool.       Past Medical History:  Past Medical History:   Diagnosis Date    Anxiety     Arthritis     Bilateral lower extremity edema     severe chronic    Carotid artery occlusion     Cataract     Depression     Fever blister     Hypothyroid     Iron deficiency anemia     Lumbar radiculopathy     with chronic pain    Ocular migraine     Sleep apnea     cpap       Home Medications:  Prior to Admission medications    Medication Sig Start Date End Date Taking? Authorizing Provider   ascorbic acid, vitamin C, (VITAMIN C) 500 mg Chew Take 1 tablet by mouth 3 (three) times daily. 9/22/17  Yes Shireen Mayo MD   atorvastatin (LIPITOR) 80 MG tablet TAKE ONE TABLET BY MOUTH EVERY DAY.  Patient taking differently: TAKE ONE TABLET BY MOUTH Nightly 12/4/17  Yes Mesfin Hodges II, MD   BUTALBITAL/ASPIRIN/CAFFEINE (FIORINAL ORAL) Take by mouth as needed.   Yes Historical Provider, MD   DOC-Q-LACE 100 mg capsule TAKE ONE CAPSULE BY MOUTH THREE TIMES A DAY AS NEEDED FOR CONSTIPATION 1/8/18  Yes Mesfin Hodges II, MD   doxycycline (MONODOX) 100 MG capsule Take 1 capsule (100 mg  total) by mouth every 12 (twelve) hours. 1/16/18  Yes Cat Pierce NP   FLUoxetine (PROZAC) 20 MG capsule Take 3 capsules (60 mg total) by mouth once daily. 11/20/17 11/20/18 Yes Erik Oconnor MD   furosemide (LASIX) 40 MG tablet Take 1 tablet (40 mg total) by mouth 2 (two) times daily. 12/29/17 12/29/18 Yes Isaias Anderson MD   hydrocodone-acetaminophen 10-325mg (NORCO)  mg Tab Take 2 tablets by mouth every 4 (four) hours as needed for Pain.    Yes Historical Provider, MD   levothyroxine (SYNTHROID) 112 MCG tablet Take 1 tablet (112 mcg total) by mouth before breakfast. 5/18/17  Yes Mesfin Hodges II, MD   levothyroxine (SYNTHROID) 125 MCG tablet TAKE ONE TABLET BY MOUTH EVERY DAY. 1/15/18  Yes Mesfin Hodges II, MD   melatonin 10 mg Tab Take 10 mg by mouth every evening.   Yes Historical Provider, MD   methenamine (HIPREX) 1 gram Tab Take 1 tablet (1 g total) by mouth 2 (two) times daily. 9/29/17  Yes Shireen Mayo MD   ondansetron (ZOFRAN) 8 MG tablet TAKE ONE TABLET BY MOUTH EVERY TWELVE HOURS AS NEEDED FOR NAUSEA 1/29/18  Yes Mesfin Hodges II, MD   oxybutynin (DITROPAN XL) 15 MG TR24 Take 1 tablet (15 mg total) by mouth once daily. 6/12/17  Yes Shireen Mayo MD   pantoprazole (PROTONIX) 40 MG tablet TAKE ONE TABLET BY MOUTH EVERY DAY. 10/29/17  Yes Mesfin Hodges II, MD   potassium chloride SA (K-DUR,KLOR-CON) 20 MEQ tablet Take 2 tablets (40 mEq total) by mouth 2 (two) times daily. 12/29/17  Yes Isaias Anderson MD   potassium citrate (UROCIT-K) 5 mEq (540 mg) TbSR Take 1 tablet (5 mEq total) by mouth 3 (three) times daily with meals. 11/30/17 11/30/18 Yes Shireen Mayo MD   ranitidine (ZANTAC) 150 MG capsule Take 1 capsule (150 mg total) by mouth 2 (two) times daily. 12/29/17  Yes Isaias Anderson MD   SENNOSIDES (SENNA LAX ORAL) Take 20 mg by mouth every evening.    Yes Historical Provider, MD   trazodone (DESYREL) 50 MG tablet Take 3 tablets (150 mg total) by mouth  "every evening. 10/11/17 10/11/18 Yes Erik Oconnor MD   aspirin (ECOTRIN) 81 MG EC tablet Take 1 tablet (81 mg total) by mouth once daily. 1/7/16 12/28/17  Tomer Douglas MD   lamotrigine (LAMICTAL) 25 MG tablet Take 1 tablet (25 mg total) by mouth once daily. 5/3/13 3/23/17  Kimberley Chandra MD       Review of Systems:  Review of Systems   Constitutional: Positive for chills. Negative for fatigue and fever.   Gastrointestinal: Positive for abdominal pain, diarrhea, nausea and vomiting. Negative for blood in stool and constipation.       Health Maintainence:   Immunizations:  Health Maintenance       Date Due Completion Date    Zoster Vaccine 08/25/2016 ---    Lipid Panel 08/31/2018 8/31/2017    Mammogram 11/30/2018 11/30/2017 (Declined)    Override on 11/30/2017: Declined    Override on 11/28/2016: Declined (per md note)    Fecal Occult Blood Test (FOBT)/[employer] 12/06/2018 12/6/2017    TETANUS VACCINE 03/02/2027 3/2/2017           PHYSICAL EXAM     /70 (BP Location: Left arm, Patient Position: Sitting, BP Method: Large (Manual))   Pulse (!) 54   Ht 5' 4" (1.626 m)   LMP  (LMP Unknown)   SpO2 98%   BMI 26.68 kg/m²     Physical Exam   Constitutional: She is oriented to person, place, and time. She appears well-developed and well-nourished.   HENT:   Head: Normocephalic.   Right Ear: External ear normal.   Left Ear: External ear normal.   Nose: Nose normal.   Mouth/Throat: Oropharynx is clear and moist. No oropharyngeal exudate.   Eyes: Pupils are equal, round, and reactive to light.   Neck: Neck supple. No JVD present. No tracheal deviation present. No thyromegaly present.   Cardiovascular: Normal rate, regular rhythm, normal heart sounds and intact distal pulses.  Exam reveals no gallop and no friction rub.    No murmur heard.  Pulmonary/Chest: Effort normal and breath sounds normal. No respiratory distress. She has no wheezes. She has no rales.   Abdominal: Soft. Bowel sounds are normal. " She exhibits no distension. There is tenderness. There is guarding. There is no rebound.   Musculoskeletal: Normal range of motion. She exhibits no edema or tenderness.   Lymphadenopathy:     She has no cervical adenopathy.   Neurological: She is alert and oriented to person, place, and time.   Skin: Skin is warm and dry. No rash noted.   Psychiatric: She has a normal mood and affect. Her behavior is normal.   Vitals reviewed.      LABS     Lab Results   Component Value Date    HGBA1C 5.4 08/25/2016     CMP  Sodium   Date Value Ref Range Status   01/30/2018 134 (L) 136 - 145 mmol/L Final     Potassium   Date Value Ref Range Status   01/30/2018 4.6 3.5 - 5.1 mmol/L Final     Chloride   Date Value Ref Range Status   01/30/2018 94 (L) 95 - 110 mmol/L Final     CO2   Date Value Ref Range Status   01/30/2018 32 (H) 23 - 29 mmol/L Final     Glucose   Date Value Ref Range Status   01/30/2018 99 70 - 110 mg/dL Final     BUN, Bld   Date Value Ref Range Status   01/30/2018 15 8 - 23 mg/dL Final     Creatinine   Date Value Ref Range Status   01/30/2018 1.1 0.5 - 1.4 mg/dL Final     Calcium   Date Value Ref Range Status   01/30/2018 9.8 8.7 - 10.5 mg/dL Final     Total Protein   Date Value Ref Range Status   01/30/2018 7.3 6.0 - 8.4 g/dL Final     Albumin   Date Value Ref Range Status   01/30/2018 4.2 3.5 - 5.2 g/dL Final     Total Bilirubin   Date Value Ref Range Status   01/30/2018 0.8 0.1 - 1.0 mg/dL Final     Comment:     For infants and newborns, interpretation of results should be based  on gestational age, weight and in agreement with clinical  observations.  Premature Infant recommended reference ranges:  Up to 24 hours.............<8.0 mg/dL  Up to 48 hours............<12.0 mg/dL  3-5 days..................<15.0 mg/dL  6-29 days.................<15.0 mg/dL       Alkaline Phosphatase   Date Value Ref Range Status   01/30/2018 112 55 - 135 U/L Final     AST   Date Value Ref Range Status   01/30/2018 12 10 - 40 U/L Final      ALT   Date Value Ref Range Status   01/30/2018 10 10 - 44 U/L Final     Anion Gap   Date Value Ref Range Status   01/30/2018 8 8 - 16 mmol/L Final     eGFR if    Date Value Ref Range Status   01/30/2018 >60 >60 mL/min/1.73 m^2 Final     eGFR if non    Date Value Ref Range Status   01/30/2018 54 (A) >60 mL/min/1.73 m^2 Final     Comment:     Calculation used to obtain the estimated glomerular filtration  rate (eGFR) is the CKD-EPI equation.        Lab Results   Component Value Date    WBC 7.37 01/30/2018    HGB 12.0 01/30/2018    HCT 36.6 (L) 01/30/2018    MCV 85 01/30/2018     01/30/2018     Lab Results   Component Value Date    CHOL 111 (L) 08/31/2017    CHOL 108 (L) 03/15/2016    CHOL 191 01/07/2016     Lab Results   Component Value Date    HDL 42 08/31/2017    HDL 49 03/15/2016    HDL 80 (H) 01/07/2016     Lab Results   Component Value Date    LDLCALC 56.2 (L) 08/31/2017    LDLCALC 46.4 (L) 03/15/2016    LDLCALC 104.2 01/07/2016     Lab Results   Component Value Date    TRIG 64 08/31/2017    TRIG 63 03/15/2016    TRIG 34 01/07/2016     Lab Results   Component Value Date    CHOLHDL 37.8 08/31/2017    CHOLHDL 45.4 03/15/2016    CHOLHDL 41.9 01/07/2016     Lab Results   Component Value Date    TSH 5.063 (H) 08/20/2017       ASSESSMENT/PLAN     Tasha Hawley is a 62 y.o. female with  Past Medical History:   Diagnosis Date    Anxiety     Arthritis     Bilateral lower extremity edema     severe chronic    Carotid artery occlusion     Cataract     Depression     Fever blister     Hypothyroid     Iron deficiency anemia     Lumbar radiculopathy     with chronic pain    Ocular migraine     Sleep apnea     cpap     Abdominal pain, unspecified abdominal location- given diarrhea abd pain vomiting and level of guarding and TTP will send for imaging. Labs today.   -     Cancel: CT Abdomen Pelvis W Wo Contrast; Future; Expected date: 01/30/2018  -     CBC auto  differential; Future; Expected date: 01/30/2018  -     Comprehensive metabolic panel; Future; Expected date: 01/30/2018  -     Lipase; Future; Expected date: 01/30/2018   -     hydrOXYzine HCl (ATARAX) 25 MG tablet; Take 1 t ablet (25 mg total) by mouth 3 (three) times daily as  needed for Itching.  Dispense: 30 tablet; Refill: 1    Nausea vomiting and diarrhea- sodium low, advised pt to start oral rehydration with gatorade or Pedialyte. May use hydroxizine as needed for itching.   -     hydrOXYzine HCl (ATARAX) 25 MG tablet; Take 1 tablet (25 mg total) by mouth 3 (three) times daily as needed for Itching.  Dispense: 30 tablet; Refill: 1      Neurogenic bladder/Urinary retention-- with pubic catheter. Draining clear to light yellow urine.     Urinary tract infection associated with cystostomy catheter, subsequent encounter- awaiting urine cultures, may be source of n/v.           Follow up with PCP on Thursday for routine f/u     Patient education provided from Martha. Patient was counseled on when and how to seek emergent care.       Cat PALENCIA, APRN, FNP-c   Department of Internal Medicine - [facility]  1:10 PM

## 2018-01-31 LAB — BACTERIA UR CULT: NORMAL

## 2018-02-01 ENCOUNTER — TELEPHONE (OUTPATIENT)
Dept: UROLOGY | Facility: CLINIC | Age: 63
End: 2018-02-01

## 2018-02-01 ENCOUNTER — OFFICE VISIT (OUTPATIENT)
Dept: GASTROENTEROLOGY | Facility: CLINIC | Age: 63
End: 2018-02-01
Payer: MEDICARE

## 2018-02-01 ENCOUNTER — OFFICE VISIT (OUTPATIENT)
Dept: INTERNAL MEDICINE | Facility: CLINIC | Age: 63
End: 2018-02-01
Payer: MEDICARE

## 2018-02-01 VITALS
BODY MASS INDEX: 28.46 KG/M2 | WEIGHT: 166.69 LBS | HEIGHT: 64 IN | SYSTOLIC BLOOD PRESSURE: 95 MMHG | DIASTOLIC BLOOD PRESSURE: 40 MMHG | HEART RATE: 60 BPM

## 2018-02-01 VITALS
BODY MASS INDEX: 26.54 KG/M2 | HEIGHT: 64 IN | DIASTOLIC BLOOD PRESSURE: 58 MMHG | WEIGHT: 155.44 LBS | HEART RATE: 68 BPM | SYSTOLIC BLOOD PRESSURE: 110 MMHG

## 2018-02-01 DIAGNOSIS — T83.510D URINARY TRACT INFECTION ASSOCIATED WITH CYSTOSTOMY CATHETER, SUBSEQUENT ENCOUNTER: ICD-10-CM

## 2018-02-01 DIAGNOSIS — N31.9 NEUROGENIC BLADDER: Chronic | ICD-10-CM

## 2018-02-01 DIAGNOSIS — I77.9 BILATERAL CAROTID ARTERY DISEASE: ICD-10-CM

## 2018-02-01 DIAGNOSIS — E03.9 PRIMARY HYPOTHYROIDISM: Chronic | ICD-10-CM

## 2018-02-01 DIAGNOSIS — F11.20 NARCOTIC DEPENDENCY, CONTINUOUS: Chronic | ICD-10-CM

## 2018-02-01 DIAGNOSIS — N39.0 URINARY TRACT INFECTION ASSOCIATED WITH CYSTOSTOMY CATHETER, SUBSEQUENT ENCOUNTER: ICD-10-CM

## 2018-02-01 DIAGNOSIS — G47.01 INSOMNIA DUE TO MEDICAL CONDITION: ICD-10-CM

## 2018-02-01 DIAGNOSIS — F41.1 GENERALIZED ANXIETY DISORDER: Primary | ICD-10-CM

## 2018-02-01 DIAGNOSIS — I70.0 THORACIC AORTA ATHEROSCLEROSIS: ICD-10-CM

## 2018-02-01 DIAGNOSIS — R13.19 ESOPHAGEAL DYSPHAGIA: Primary | ICD-10-CM

## 2018-02-01 DIAGNOSIS — Z93.59 SUPRAPUBIC CATHETER: ICD-10-CM

## 2018-02-01 PROCEDURE — 99214 OFFICE O/P EST MOD 30 MIN: CPT | Mod: S$GLB,,, | Performed by: PHYSICIAN ASSISTANT

## 2018-02-01 PROCEDURE — 99499 UNLISTED E&M SERVICE: CPT | Mod: S$GLB,,, | Performed by: INTERNAL MEDICINE

## 2018-02-01 PROCEDURE — 99214 OFFICE O/P EST MOD 30 MIN: CPT | Mod: 25,S$GLB,, | Performed by: INTERNAL MEDICINE

## 2018-02-01 PROCEDURE — 99999 PR PBB SHADOW E&M-EST. PATIENT-LVL V: CPT | Mod: PBBFAC,,, | Performed by: PHYSICIAN ASSISTANT

## 2018-02-01 PROCEDURE — 96372 THER/PROPH/DIAG INJ SC/IM: CPT | Mod: S$GLB,,, | Performed by: INTERNAL MEDICINE

## 2018-02-01 PROCEDURE — 3008F BODY MASS INDEX DOCD: CPT | Mod: S$GLB,,, | Performed by: PHYSICIAN ASSISTANT

## 2018-02-01 PROCEDURE — 99999 PR PBB SHADOW E&M-EST. PATIENT-LVL III: CPT | Mod: PBBFAC,,, | Performed by: INTERNAL MEDICINE

## 2018-02-01 PROCEDURE — 3008F BODY MASS INDEX DOCD: CPT | Mod: S$GLB,,, | Performed by: INTERNAL MEDICINE

## 2018-02-01 RX ORDER — PANTOPRAZOLE SODIUM 40 MG/1
40 TABLET, DELAYED RELEASE ORAL DAILY
Qty: 90 TABLET | Refills: 3 | Status: SHIPPED | OUTPATIENT
Start: 2018-02-01 | End: 2019-05-28 | Stop reason: SDUPTHER

## 2018-02-01 RX ORDER — CEFTRIAXONE 500 MG/1
500 INJECTION, POWDER, FOR SOLUTION INTRAMUSCULAR; INTRAVENOUS
Status: COMPLETED | OUTPATIENT
Start: 2018-02-01 | End: 2018-02-01

## 2018-02-01 RX ORDER — QUETIAPINE FUMARATE 100 MG/1
100 TABLET, FILM COATED ORAL NIGHTLY
Qty: 90 TABLET | Refills: 3 | Status: SHIPPED | OUTPATIENT
Start: 2018-02-01 | End: 2018-02-05 | Stop reason: SINTOL

## 2018-02-01 RX ORDER — LEVOTHYROXINE SODIUM 112 UG/1
112 TABLET ORAL
Qty: 90 TABLET | Refills: 3 | Status: SHIPPED | OUTPATIENT
Start: 2018-02-01 | End: 2018-05-17 | Stop reason: SDUPTHER

## 2018-02-01 RX ADMIN — CEFTRIAXONE 500 MG: 500 INJECTION, POWDER, FOR SOLUTION INTRAMUSCULAR; INTRAVENOUS at 12:02

## 2018-02-01 NOTE — TELEPHONE ENCOUNTER
Called patient about her urine culture results.  She reports seeing her pcp today and was given a rocephin shot (500mg).  Follow up in 1 month for catheter change if hh does not change it.

## 2018-02-01 NOTE — PROGRESS NOTES
[facility] Consultation Note    Reason for Consult:  The encounter diagnosis was Esophageal dysphagia.    PCP:   Mesfin Hodges Ii       Referring MD:  No referring provider defined for this encounter.    HPI:  This is a 62 y.o. female here for evaluation of dysphagia  She is a pt of Dr. Vance's  Last seen 7/2017 for this issue  Her 7/2017 EGD Nissen fundoplication wrap is tight and moderate resistance to scope        passage. No evidence of a failed wrap endoscopically. A TTS dilator        was passed through the scope. Dilation with a 15-16.5-18 mm balloon        dilator was performed to 18 mm.   Today she says the dilation helped for a few months.   She was seen by Dr Llamas who advised repeat dilation prior to surgery    Having dysphagia of solids, liquids, and pills since 1/2017.  Symptoms are worsening  + nausea  Taking protonix once daily as well as Zantac as needed    Last EGD 2014 - Nissen fundoplication wrap is tight and        moderate resistance to scope passage. No evidence of a failed wrap        endoscopically. A TTS dilator was passed through the scope. Dilation        with a 15-16.5-18 mm balloon (to a maximum balloon size of 18 mm)        dilator was performed    ROS:  Constitutional: No fevers, chills, No weight loss  ENT: No allergies  CV: No chest pain  Pulm: No cough, No shortness of breath  Ophtho: No vision changes  GI: see HPI  Derm: No rash  Heme: No lymphadenopathy, No bruising  MSK: No arthritis  : No dysuria, No hematuria  Endo: No hot or cold intolerance  Neuro: No syncope, No seizure  Psych: No anxiety, No depression    Medical History:  has a past medical history of Anxiety; Arthritis; Bilateral lower extremity edema; Carotid artery occlusion; Cataract; Depression; Fever blister; Hypothyroid; Iron deficiency anemia; Lumbar radiculopathy; Ocular migraine; and Sleep apnea.    Surgical History:  has a past surgical history that includes Hysterectomy  (1975); Back surgery; pain pump placement; Spine surgery (5-13-13); Cataract extraction w/  intraocular lens implant (Left); and Spinal cord stimulator removal.    Family History: family history includes Cancer in her father; Cancer (age of onset: 55) in her mother; Esophageal cancer in her father; Heart disease in her maternal uncle; No Known Problems in her brother, brother, maternal aunt, maternal grandfather, paternal aunt, paternal grandfather, paternal grandmother, paternal uncle, and sister; Parkinsonism in her maternal grandmother; Tremor in her maternal grandmother..     Social History:  reports that she has never smoked. She has never used smokeless tobacco. She reports that she does not drink alcohol or use drugs.    Review of patient's allergies indicates:   Allergen Reactions    Imitrex [sumatriptan succinate] Shortness Of Breath    Penicillins Shortness Of Breath     Other reaction(s): Jittery    Topamax [topiramate] Shortness Of Breath    Vancomycin Shortness Of Breath     Rash    (d)-limonene flavor      Other reaction(s): difficult intubation  Other reaction(s): Jittery  Other reaction(s): Difficulty breathing  Other reaction(s): Difficulty breathing  Other reaction(s): Jittery  Other reaction(s): Difficulty breathing    Bactrim [sulfamethoxazole-trimethoprim] Nausea And Vomiting     Other reaction(s): Resp Depression  Other reaction(s): Anaphylaxis    Butorphanol tartrate     Darvocet a500 [propoxyphene n-acetaminophen]      Other reaction(s): Jittery  Other reaction(s): Difficulty breathing    Fentanyl      Other reaction(s): Vomiting  Other reaction(s): Nausea  Other reaction(s): Itching  swelling    Phenytoin sodium extended      pATIENT DENIES EVER HAVING THIS MEDICATION    White petrolatum-zinc     Zinc oxide-white petrolatum      Other reaction(s): Difficulty breathing    Latex, natural rubber Itching and Rash       Current Outpatient Prescriptions on File Prior to Visit    Medication Sig Dispense Refill    ascorbic acid, vitamin C, (VITAMIN C) 500 mg Chew Take 1 tablet by mouth 3 (three) times daily. 90 each 11    aspirin (ECOTRIN) 81 MG EC tablet Take 1 tablet (81 mg total) by mouth once daily.  0    atorvastatin (LIPITOR) 80 MG tablet TAKE ONE TABLET BY MOUTH EVERY DAY. (Patient taking differently: TAKE ONE TABLET BY MOUTH Nightly) 90 tablet 3    DOC-Q-LACE 100 mg capsule TAKE ONE CAPSULE BY MOUTH THREE TIMES A DAY AS NEEDED FOR CONSTIPATION 90 capsule 3    FLUoxetine (PROZAC) 20 MG capsule Take 3 capsules (60 mg total) by mouth once daily. 90 capsule 2    furosemide (LASIX) 40 MG tablet Take 1 tablet (40 mg total) by mouth 2 (two) times daily. 60 tablet 11    hydrocodone-acetaminophen 10-325mg (NORCO)  mg Tab Take 2 tablets by mouth every 4 (four) hours as needed for Pain.       hydrOXYzine HCl (ATARAX) 25 MG tablet Take 1 tablet (25 mg total) by mouth 3 (three) times daily as needed for Itching. 30 tablet 1    levothyroxine (SYNTHROID) 112 MCG tablet Take 1 tablet (112 mcg total) by mouth before breakfast. 90 tablet 3    methenamine (HIPREX) 1 gram Tab Take 1 tablet (1 g total) by mouth 2 (two) times daily. 60 tablet 11    ondansetron (ZOFRAN) 8 MG tablet TAKE ONE TABLET BY MOUTH EVERY TWELVE HOURS AS NEEDED FOR NAUSEA 60 tablet 2    oxybutynin (DITROPAN XL) 15 MG TR24 Take 1 tablet (15 mg total) by mouth once daily. 30 tablet 6    potassium chloride SA (K-DUR,KLOR-CON) 20 MEQ tablet Take 2 tablets (40 mEq total) by mouth 2 (two) times daily. 120 tablet 11    potassium citrate (UROCIT-K) 5 mEq (540 mg) TbSR Take 1 tablet (5 mEq total) by mouth 3 (three) times daily with meals. 90 tablet 11    QUEtiapine (SEROQUEL) 100 MG Tab Take 1 tablet (100 mg total) by mouth every evening. 90 tablet 3    ranitidine (ZANTAC) 150 MG capsule Take 1 capsule (150 mg total) by mouth 2 (two) times daily. 60 capsule 2    SENNOSIDES (SENNA LAX ORAL) Take 20 mg by mouth every  "evening.       [DISCONTINUED] BUTALBITAL/ASPIRIN/CAFFEINE (FIORINAL ORAL) Take by mouth as needed.      [DISCONTINUED] doxycycline (MONODOX) 100 MG capsule Take 1 capsule (100 mg total) by mouth every 12 (twelve) hours. 14 capsule 0    [DISCONTINUED] levothyroxine (SYNTHROID) 112 MCG tablet Take 1 tablet (112 mcg total) by mouth before breakfast. 90 tablet 3    [DISCONTINUED] pantoprazole (PROTONIX) 40 MG tablet TAKE ONE TABLET BY MOUTH EVERY DAY. 90 tablet 2    [DISCONTINUED] lamotrigine (LAMICTAL) 25 MG tablet Take 1 tablet (25 mg total) by mouth once daily. 30 tablet 6    [DISCONTINUED] levothyroxine (SYNTHROID) 125 MCG tablet TAKE ONE TABLET BY MOUTH EVERY DAY. 90 tablet 3    [DISCONTINUED] melatonin 10 mg Tab Take 10 mg by mouth every evening.      [DISCONTINUED] trazodone (DESYREL) 50 MG tablet Take 3 tablets (150 mg total) by mouth every evening. 90 tablet 3     Current Facility-Administered Medications on File Prior to Visit   Medication Dose Route Frequency Provider Last Rate Last Dose    [COMPLETED] cefTRIAXone injection 500 mg  500 mg Intramuscular 1 time in Clinic/HOD Mesfin Hodges II, MD   500 mg at 02/01/18 1215         Objective Findings:    Vital Signs:  BP (!) 95/40   Pulse 60   Ht 5' 4" (1.626 m)   Wt 75.6 kg (166 lb 10.7 oz)   LMP  (LMP Unknown)   BMI 28.61 kg/m²   Body mass index is 28.61 kg/m².    Physical Exam:  General Appearance: Well appearing in no acute distress. She is ambulating  A wheelchair  Head:   Normocephalic, without obvious abnormality  Eyes:    No scleral icterus  ENT: Neck supple, Lips, mucosa, and tongue normal  Lungs: CTA bilaterally in anterior and posterior fields, no wheezes, no crackles.  Heart:  Regular rate and rhythm, S1, S2 normal, no murmurs heard  Abdomen: Soft, diffuse abdominal tenderness, non distended with positive bowel sounds in all four quadrants.   Extremities: 1+ edema  Skin: No rash  Neurologic: AAO x 3      Labs:  Lab Results   Component " Value Date    WBC 7.37 01/30/2018    HGB 12.0 01/30/2018    HCT 36.6 (L) 01/30/2018     01/30/2018    CHOL 111 (L) 08/31/2017    TRIG 64 08/31/2017    HDL 42 08/31/2017    ALT 10 01/30/2018    AST 12 01/30/2018     (L) 01/30/2018    K 4.6 01/30/2018    CL 94 (L) 01/30/2018    CREATININE 1.1 01/30/2018    BUN 15 01/30/2018    CO2 32 (H) 01/30/2018    TSH 5.063 (H) 08/20/2017    INR 0.9 05/27/2014    HGBA1C 5.4 08/25/2016       Imaging:    Endoscopy:    Last EGD 2014 - Nissen fundoplication wrap is tight and        moderate resistance to scope passage. No evidence of a failed wrap        endoscopically. A TTS dilator was passed through the scope. Dilation        with a 15-16.5-18 mm balloon (to a maximum balloon size of 18 mm)        dilator was performed    7/2017 EGD Nissen fundoplication wrap is tight and moderate resistance to scope        passage. No evidence of a failed wrap endoscopically. A TTS dilator        was passed through the scope. Dilation with a 15-16.5-18 mm balloon        dilator was performed to 18 mm.       Assessment:  1. Esophageal dysphagia       60yo F s/ fundoplication with dysphagia of solids and liquids x 1 yr, temporary mild relief with esophageal dilations  Recommendations:  1. Schedule EGD with dilation of fundoplication of esophagus at endoscopist discretion    2. schedule esophageal manometry to rule out esophageal dysmotility    3. Continue protonix 40mg     Dr Llamas made note that she will need stress test, ugi, ges and motility study prior to surgery. 8/2017 office visit note)    No Follow-up on file.      Order summary:  Orders Placed This Encounter    pantoprazole (PROTONIX) 40 MG tablet    Case request GI: MANOMETRY-ESOPHAGEAL-WITH IMPEDANCE, ESOPHAGOGASTRODUODENOSCOPY (EGD)         Thank you so much for allowing me to participate in the care of Tasha Gleason PA-C

## 2018-02-01 NOTE — PROGRESS NOTES
Subjective:       Patient ID: Tasha Hawley is a 62 y.o. female.    Chief Complaint: Abdominal Pain; Otalgia (both ears); Results (CT abdomen ); and Nausea    HPI - Ms Hawley had several issues to discuss today.  She was seen in IM for n/v and abdominal pain last week; CT abdomen was done.  She wanted to discuss the results - several abnormal findings, but all were to be expected and none explained abd pain.  She's improved from that perspective.  She wonders does she have a UTI, as the cause of the abd pain and urinary symptoms.  She has the suprapubic catheter still. Urine culture results reviewed - positive, resistant to cipro.  She has a long list of drug allergies, and choosing an antibiotic can be challenging for her.    Her insomnia continues.  The trazodone/melatonin combination worked for a while, but today she says she hasn't slept in three days.  She tried vistaril, but that caused her to itch and she stopped that immediately.  She's wearing special shoes per podiatry to deal with plantar bone spurs.  She's not a smoker.    PMH:  Chronic insomnia.  Chronic low back pain with narcotic use.  Indwelling narcotic pump  History CVA with dysarthria  Neurogenic bladder, with recurrent UTI's.  SP catheter placed Nov 2016  Hypothyroidism, stable  CECI, not on CPAP  CAD, with ACS in Jan 2016 - subtot mid LAD lesion without PCI; ischemic CM with EF 40% and chronic LE edema. Followed by [facility]  Anxiety and Depression  Chronic constipation, likely d/t ongoing narcotic use  Intermittent nausea  Chronic LE edema     Meds: Reviewed and reconciled in EPIC with patient during visit today.    Review of Systems   Constitutional: Negative for fever.   HENT: Negative for congestion.    Respiratory: Negative for cough.    Cardiovascular: Negative for chest pain.   Gastrointestinal: Positive for abdominal pain and nausea.   Genitourinary: Positive for difficulty urinating.   Musculoskeletal: Positive for  arthralgias and back pain.   Skin: Negative for rash.   Neurological: Negative for headaches.   Psychiatric/Behavioral: Positive for sleep disturbance.       Objective:      Physical Exam   Constitutional: She appears well-developed and well-nourished.   Chronically ill appearing WF in NAD today.  Friendly, in office with .  Not in wheelchair for the first time in a long while.   HENT:   Head: Normocephalic and atraumatic.   Cardiovascular: Normal rate, regular rhythm and normal heart sounds.  Exam reveals no gallop and no friction rub.    No murmur heard.  Pulmonary/Chest: Effort normal and breath sounds normal. No respiratory distress. She has no wheezes. She has no rales. She exhibits no tenderness.   Neurological: She is alert.   Skin: Skin is warm and dry. No erythema.   Psychiatric: She has a normal mood and affect.   Nursing note and vitals reviewed.      Assessment:       1. Generalized anxiety disorder    2. Narcotic dependency, continuous    3. Neurogenic bladder    4. Primary hypothyroidism    5. Urinary tract infection associated with cystostomy catheter, subsequent encounter    6. Thoracic aorta atherosclerosis    7. Bilateral carotid artery disease    8. Suprapubic catheter    9. Insomnia due to medical condition        Plan:       Tahsa was seen today for abdominal pain, otalgia, results and nausea.    Diagnoses and all orders for this visit:    Generalized anxiety disorder - stable, but related to her insomnia.  Continue present care. Her psychiatrist had a stroke, and she's not sure if/when he'll return to work    Narcotic dependency, continuous - stable, outside provider gives refills    Neurogenic bladder    Primary hypothyroidism - stable, refilling synthroid dose to clear up prescription confusion  -     levothyroxine (SYNTHROID) 112 MCG tablet; Take 1 tablet (112 mcg total) by mouth before breakfast.    Urinary tract infection associated with cystostomy catheter, subsequent encounter -  with multiple sensitivities, will try ceftriaxone injection today  -     cefTRIAXone injection 500 mg; Inject 0.5 g (500 mg total) into the muscle one time.    Thoracic aorta atherosclerosis -  Noted in chart    Bilateral carotid artery disease    Suprapubic catheter  -     cefTRIAXone injection 500 mg; Inject 0.5 g (500 mg total) into the muscle one time.    Insomnia due to medical condition - her biggest issue, unstable.  Will stop all other sleep meds and try seroquel at 100mg.  Try this for a few days and it should settle down  -     QUEtiapine (SEROQUEL) 100 MG Tab; Take 1 tablet (100 mg total) by mouth every evening.    rtc prn, or 3 months    PAPO Hodges MD MPH  Staff Internist

## 2018-02-05 ENCOUNTER — TELEPHONE (OUTPATIENT)
Dept: INTERNAL MEDICINE | Facility: CLINIC | Age: 63
End: 2018-02-05

## 2018-02-05 ENCOUNTER — HOSPITAL ENCOUNTER (EMERGENCY)
Facility: HOSPITAL | Age: 63
Discharge: HOME | End: 2018-02-05
Attending: EMERGENCY MEDICINE
Payer: MEDICARE

## 2018-02-05 ENCOUNTER — OFFICE VISIT (OUTPATIENT)
Dept: PSYCHIATRY | Facility: CLINIC | Age: 63
End: 2018-02-05
Payer: MEDICARE

## 2018-02-05 VITALS
WEIGHT: 153 LBS | DIASTOLIC BLOOD PRESSURE: 65 MMHG | HEART RATE: 61 BPM | HEIGHT: 64 IN | BODY MASS INDEX: 26.12 KG/M2 | OXYGEN SATURATION: 99 % | SYSTOLIC BLOOD PRESSURE: 140 MMHG | RESPIRATION RATE: 16 BRPM | TEMPERATURE: 97 F

## 2018-02-05 DIAGNOSIS — R45.4 IRRITABILITY AND ANGER: ICD-10-CM

## 2018-02-05 DIAGNOSIS — F33.0 MAJOR DEPRESSIVE DISORDER, RECURRENT, MILD: Primary | ICD-10-CM

## 2018-02-05 DIAGNOSIS — B00.2 ORAL HERPES SIMPLEX INFECTION: Primary | ICD-10-CM

## 2018-02-05 DIAGNOSIS — F41.1 GENERALIZED ANXIETY DISORDER: ICD-10-CM

## 2018-02-05 PROCEDURE — 99283 EMERGENCY DEPT VISIT LOW MDM: CPT

## 2018-02-05 PROCEDURE — 99999 PR PBB SHADOW E&M-EST. PATIENT-LVL I: CPT | Mod: PBBFAC,,, | Performed by: PSYCHOLOGIST

## 2018-02-05 PROCEDURE — 90834 PSYTX W PT 45 MINUTES: CPT | Mod: S$GLB,,, | Performed by: PSYCHOLOGIST

## 2018-02-05 RX ORDER — MIRTAZAPINE 15 MG/1
15 TABLET, FILM COATED ORAL NIGHTLY
Qty: 30 TABLET | Refills: 0 | Status: SHIPPED | OUTPATIENT
Start: 2018-02-05 | End: 2018-02-05

## 2018-02-05 RX ORDER — MIRTAZAPINE 15 MG/1
15 TABLET, FILM COATED ORAL NIGHTLY
Qty: 30 TABLET | Refills: 0 | Status: SHIPPED | OUTPATIENT
Start: 2018-02-05 | End: 2018-02-08

## 2018-02-05 RX ORDER — DOCOSANOL 100 MG/G
1 CREAM TOPICAL 2 TIMES DAILY
Qty: 2 G | Refills: 0 | Status: SHIPPED | OUTPATIENT
Start: 2018-02-05 | End: 2018-02-05

## 2018-02-05 RX ORDER — VALACYCLOVIR HYDROCHLORIDE 1 G/1
1000 TABLET, FILM COATED ORAL 3 TIMES DAILY
Qty: 21 TABLET | Refills: 0 | Status: SHIPPED | OUTPATIENT
Start: 2018-02-05 | End: 2018-03-08 | Stop reason: SDUPTHER

## 2018-02-05 RX ORDER — VALACYCLOVIR HYDROCHLORIDE 1 G/1
1000 TABLET, FILM COATED ORAL 3 TIMES DAILY
Qty: 21 TABLET | Refills: 0 | Status: SHIPPED | OUTPATIENT
Start: 2018-02-05 | End: 2018-02-05

## 2018-02-05 RX ORDER — DOCOSANOL 100 MG/G
1 CREAM TOPICAL 2 TIMES DAILY
Qty: 2 G | Refills: 0 | Status: SHIPPED | OUTPATIENT
Start: 2018-02-05 | End: 2019-02-28

## 2018-02-05 NOTE — TELEPHONE ENCOUNTER
Spoke with pt, dr Hodges has switched pt to Seroquil . Pt states that its working, she slept great the first couple of nights and now is only sleeping 3 hours a night. She broke out with ulcers in outside and inside. She would like to get something for it as well.

## 2018-02-05 NOTE — ED PROVIDER NOTES
Encounter Date: 2/5/2018    SCRIBE #1 NOTE: IFlorencio, am scribing for, and in the presence of, Dr. Suarez.       History     Chief Complaint   Patient presents with    Blister     Broke out with blisters on lips and oral mucosa with generalized itching shortly after starting Seraquel on Saturday. States PCP told her to come here b/o possible allergic reaction. Presents awake, alert, oriented. Skin w/d. Respirations unlabored. No distress.      Time patient was seen by the provider: 4:36 PM      The patient is a 62 y.o. female with hx: iron deficiency anemia, lumbar radiculopathy, and carotid artery occlusion who presents to the ED with a complaint of blisters on her lips and mouth.  Patient reports itching in the area of the blisters, as well as on the back of her neck. She notes slight difficulty swallowing. She denies any dysuria. Patient reports hx of fever blisters. She began taking Seroquel two days ago because she was having difficulty sleeping. Her PCP advised her to visit the ED due to a possible allergic reaction to the Seroquel.      The history is provided by the patient and the spouse.     Review of patient's allergies indicates:   Allergen Reactions    (d)-limonene flavor      Other reaction(s): difficult intubation  Other reaction(s): Difficulty breathing    Bactrim [sulfamethoxazole-trimethoprim] Anaphylaxis    Imitrex [sumatriptan succinate] Shortness Of Breath    Penicillins Shortness Of Breath     Other reaction(s): Jittery    Topamax [topiramate] Shortness Of Breath    Vancomycin Shortness Of Breath     Rash    Butorphanol tartrate     Darvocet a500 [propoxyphene n-acetaminophen]      Other reaction(s): Difficulty breathing    Fentanyl      Other reaction(s): Vomiting  Other reaction(s): Nausea  Other reaction(s): Itching  swelling    White petrolatum-zinc     Zinc oxide-white petrolatum      Other reaction(s): Difficulty breathing    Latex, natural rubber Itching and Rash     Past  Medical History:   Diagnosis Date    Anxiety     Arthritis     Bilateral lower extremity edema     severe chronic    Carotid artery occlusion     Cataract     Depression     Fever blister     Hypothyroid     Iron deficiency anemia     Lumbar radiculopathy     with chronic pain    Ocular migraine     Sleep apnea     cpap     Past Surgical History:   Procedure Laterality Date    BACK SURGERY      x 12    CATARACT EXTRACTION W/  INTRAOCULAR LENS IMPLANT Left     Dr Coleman     HYSTERECTOMY  1975    endometriosis    pain pump placement      SQ Dilaudid Pump managed by Dr. Hillman, Pain Management    SPINAL CORD STIMULATOR REMOVAL      before [place]    SPINE SURGERY  5-13-13    CERVICAL FUSION     Family History   Problem Relation Age of Onset    Cancer Mother 55     breast    Cancer Father      esophagus,had laryngectomy    Esophageal cancer Father     Parkinsonism Maternal Grandmother     Tremor Maternal Grandmother     No Known Problems Brother     No Known Problems Brother     Heart disease Maternal Uncle     No Known Problems Sister     No Known Problems Maternal Aunt     No Known Problems Paternal Aunt     No Known Problems Paternal Uncle     No Known Problems Maternal Grandfather     No Known Problems Paternal Grandmother     No Known Problems Paternal Grandfather     Melanoma Neg Hx     Amblyopia Neg Hx     Blindness Neg Hx     Cataracts Neg Hx     Diabetes Neg Hx     Glaucoma Neg Hx     Hypertension Neg Hx     Macular degeneration Neg Hx     Retinal detachment Neg Hx     Strabismus Neg Hx     Stroke Neg Hx     Thyroid disease Neg Hx     Colon cancer Neg Hx      Social History   Substance Use Topics    Smoking status: Never Smoker    Smokeless tobacco: Never Used    Alcohol use No      Comment: denies     Review of Systems   Constitutional: Negative for fever.   HENT: Positive for mouth sores and trouble swallowing. Negative for sore throat.    Respiratory:  Negative for shortness of breath.    Cardiovascular: Negative for chest pain.   Gastrointestinal: Negative for nausea.   Genitourinary: Negative for dysuria.   Musculoskeletal: Negative for back pain.   Skin: Positive for rash.   Neurological: Negative for weakness.   Hematological: Does not bruise/bleed easily.       Physical Exam     Initial Vitals [02/05/18 1557]   BP Pulse Resp Temp SpO2   (!) 140/65 61 16 96.6 °F (35.9 °C) 99 %      MAP       90         Physical Exam    Nursing note and vitals reviewed.  Constitutional: She appears well-developed and well-nourished.   HENT:   Head: Normocephalic.   Vesicular crusted lesions on upper and lower lips. A few lesions inside mouth on cheeks and lips as well.   Eyes: Conjunctivae and EOM are normal. Pupils are equal, round, and reactive to light.   Neck: Neck supple.   Cardiovascular: Normal rate, regular rhythm, normal heart sounds and intact distal pulses. Exam reveals no gallop and no friction rub.    No murmur heard.  Pulmonary/Chest: Breath sounds normal. She has no wheezes. She has no rhonchi. She has no rales.   Abdominal: Soft. She exhibits no distension. There is no tenderness.   Musculoskeletal: Normal range of motion.   Neurological: She is alert and oriented to person, place, and time.   Psychiatric: She has a normal mood and affect.         ED Course   Procedures  Labs Reviewed - No data to display          Medical Decision Making:   History:   Old Medical Records: I decided to obtain old medical records.  Initial Assessment:   62 y.o. Female presents with blisters on lips and in mouth which began shortly after taking Seroquel two days ago.   Differential Diagnosis:   Vesicular appearing rash on mouth without any other skin change or mucosal surface involvement. Therefore suspect possible herpetic lesions, vs reaction to medication, vs possible Velez-Duran syndrome which is unlikely considering the focality of symptoms.                   ED Course       Clinical Impression:   The encounter diagnosis was Oral herpes simplex infection.    Disposition:   Disposition: Discharged  Condition: Stable         Attending Attestation:     Physician Attestation for Scribe:    I, Dr. Adriana Suarez, personally performed the services described in this documentation.   All medical record entries made by the scribe were at my direction and in my presence.   I have reviewed the chart and agree that the record is accurate and complete.   Adriana Suarez MD  11:24 AM 02/26/2018                    Adriana Suarez MD  02/26/18 1124

## 2018-02-05 NOTE — DISCHARGE INSTRUCTIONS
Discontinue the seroquel as it may be causing an allergic reaction.  Return to the hospital immediately if you have a rash on your body that is painful or very itchy or for any trouble breathing or swallowing.  Take the medication as prescribed.  Followup with your doctor and discuss the medication suggested for insomnia.

## 2018-02-05 NOTE — TELEPHONE ENCOUNTER
----- Message from Maria Esther Winter sent at 2/5/2018  7:21 AM CST -----  Contact: Self Call [phone]  Patient would like to get medical advice.  Symptoms (please be specific):  Fever Blisters inside mouth and on lips  How long has patient had these symptoms:  Started the weekend   Pharmacy name and phone #:  [facility]  [phone]  Any drug allergies:  Check chart       Comments: She would like to speak with Dr. Hodges

## 2018-02-05 NOTE — PROGRESS NOTES
"Individual Psychotherapy (PhD/LCSW)    2/5/2018    Site:  [facility]         Therapeutic Intervention: Met with patient.  Outpatient - Insight oriented psychotherapy 45 min - CPT code 00193    Chief complaint/reason for encounter: depression, anger and anxiety     Interval history and content of current session:  Tasha arrived on time for her scheduled appointment.  She reports feeling depressed, anxious, and irritable.  She acknowledges feeling as though she does "everything wrong" and believes that her  does not care for her.  She was encouraged to consider the triggers of their fights, and admitted that they struggle about "right vs wrong" often.  She also admitted that she likes things to be a certain way and may be rigid about it at times.  She was encouraged to come up with a daily schedule that involves household chores and pleasant activities so that she does not overdo her activities or overstep her limitations.  She was encouraged to discuss this schedule with her  so that they can be a team, rather than fighting against each other.  She was agreeable with this plan and will follow-up in several weeks.    Treatment plan:  · Target symptoms: depression, anxiety   · Why chosen therapy is appropriate versus another modality: relevant to diagnosis  · Outcome monitoring methods: self-report  · Therapeutic intervention type: insight oriented psychotherapy    Risk parameters:  Patient reports no suicidal ideation  Patient reports no homicidal ideation  Patient reports no self-injurious behavior  Patient reports no violent behavior    Verbal deficits: None    Patient's response to intervention:  The patient's response to intervention is accepting.    Progress toward goals and other mental status changes:  The patient's progress toward goals is fair .    Diagnosis:     ICD-10-CM ICD-9-CM   1. Major depressive disorder, recurrent, mild F33.0 296.31   2. Generalized anxiety disorder F41.1 300.02 "   3. Irritability and anger R45.4 799.22       Plan:  individual psychotherapy    Return to clinic: 3 weeks    Length of Service (minutes): 45

## 2018-02-06 ENCOUNTER — TELEPHONE (OUTPATIENT)
Dept: INTERNAL MEDICINE | Facility: CLINIC | Age: 63
End: 2018-02-06

## 2018-02-06 DIAGNOSIS — G47.00 INSOMNIA, UNSPECIFIED TYPE: Primary | ICD-10-CM

## 2018-02-06 NOTE — TELEPHONE ENCOUNTER
----- Message from Mitzi Bryson sent at 2/6/2018  7:58 AM CST -----  Contact: self/ [phone]  Patient went to the ER on yesterday and patient is returning the Dr. Call, please call and advise. Patient would like to speak with the DrSadiq About some medication that she was RX.

## 2018-02-06 NOTE — TELEPHONE ENCOUNTER
Spoke with pt, she was told by ER physician to d/c Seroquel because he suspected that could be causing the rash and was put on Remeron. Pt took Remeron last night. Pt states that she is now breaking up on more parts of the body and its itching her. She states that she can't take Benadryl ( not sure of what reason)

## 2018-02-07 NOTE — TELEPHONE ENCOUNTER
----- Message from Roma Reyes sent at 2/7/2018  2:12 PM CST -----  Contact: Self  Pt is calling requesting to speak with Staff regarding a reaction to medication and other medication she is on that is causing problems.    She can be reached at [phone].    Thank you.

## 2018-02-08 ENCOUNTER — TELEPHONE (OUTPATIENT)
Dept: INTERNAL MEDICINE | Facility: CLINIC | Age: 63
End: 2018-02-08

## 2018-02-08 RX ORDER — TRAZODONE HYDROCHLORIDE 100 MG/1
200 TABLET ORAL NIGHTLY
Qty: 120 TABLET | Refills: 4 | Status: SHIPPED | OUTPATIENT
Start: 2018-02-08 | End: 2018-04-03 | Stop reason: SDUPTHER

## 2018-02-08 NOTE — TELEPHONE ENCOUNTER
----- Message from Karla Barboza sent at 2/7/2018  4:31 PM CST -----  Contact: self/[phone]    Patient called in regards needing to talk with Dr Hodges assistant about early message. Please darren young advise.       Thank you!!!

## 2018-02-08 NOTE — TELEPHONE ENCOUNTER
"Severe medication and anxiety-induced insomnia.  Didn't tolerate seroquel (rash).  Remeron didn't work.  Plan is to take 200 mg trazodone tonight.  She can take one right now to "calm her down" enough to get through the day today.      Can you please call her back?  I would like for her to see a sleep medicine specialist to help with this.  I'm at a loss as to what else to do.  D  "

## 2018-02-09 ENCOUNTER — NURSE TRIAGE (OUTPATIENT)
Dept: ADMINISTRATIVE | Facility: CLINIC | Age: 63
End: 2018-02-09

## 2018-02-09 ENCOUNTER — HOSPITAL ENCOUNTER (EMERGENCY)
Facility: HOSPITAL | Age: 63
Discharge: HOME | End: 2018-02-10
Attending: EMERGENCY MEDICINE
Payer: MEDICARE

## 2018-02-09 VITALS
BODY MASS INDEX: 27.46 KG/M2 | OXYGEN SATURATION: 100 % | WEIGHT: 155 LBS | HEIGHT: 63 IN | DIASTOLIC BLOOD PRESSURE: 40 MMHG | RESPIRATION RATE: 18 BRPM | TEMPERATURE: 99 F | HEART RATE: 59 BPM | SYSTOLIC BLOOD PRESSURE: 87 MMHG

## 2018-02-09 DIAGNOSIS — G25.4 DRUG-INDUCED CHOREA: Primary | ICD-10-CM

## 2018-02-09 LAB
ALBUMIN SERPL BCP-MCNC: 3.4 G/DL
ALP SERPL-CCNC: 110 U/L
ALT SERPL W/O P-5'-P-CCNC: 8 U/L
ANION GAP SERPL CALC-SCNC: 7 MMOL/L
AST SERPL-CCNC: 13 U/L
BASOPHILS # BLD AUTO: 0 K/UL
BASOPHILS NFR BLD: 0 %
BILIRUB SERPL-MCNC: 0.4 MG/DL
BUN SERPL-MCNC: 8 MG/DL
CALCIUM SERPL-MCNC: 8.9 MG/DL
CHLORIDE SERPL-SCNC: 98 MMOL/L
CO2 SERPL-SCNC: 32 MMOL/L
CREAT SERPL-MCNC: 0.8 MG/DL
DIFFERENTIAL METHOD: ABNORMAL
EOSINOPHIL # BLD AUTO: 0.2 K/UL
EOSINOPHIL NFR BLD: 3.1 %
ERYTHROCYTE [DISTWIDTH] IN BLOOD BY AUTOMATED COUNT: 13.7 %
EST. GFR  (AFRICAN AMERICAN): >60 ML/MIN/1.73 M^2
EST. GFR  (NON AFRICAN AMERICAN): >60 ML/MIN/1.73 M^2
GLUCOSE SERPL-MCNC: 107 MG/DL
HCT VFR BLD AUTO: 33.8 %
HGB BLD-MCNC: 10.3 G/DL
LYMPHOCYTES # BLD AUTO: 1.5 K/UL
LYMPHOCYTES NFR BLD: 18.9 %
MCH RBC QN AUTO: 27.2 PG
MCHC RBC AUTO-ENTMCNC: 30.5 G/DL
MCV RBC AUTO: 89 FL
MONOCYTES # BLD AUTO: 0.7 K/UL
MONOCYTES NFR BLD: 8.9 %
NEUTROPHILS # BLD AUTO: 5.4 K/UL
NEUTROPHILS NFR BLD: 69.1 %
PLATELET # BLD AUTO: 208 K/UL
PMV BLD AUTO: 9.5 FL
POTASSIUM SERPL-SCNC: 3.6 MMOL/L
PROT SERPL-MCNC: 6.6 G/DL
RBC # BLD AUTO: 3.78 M/UL
SODIUM SERPL-SCNC: 137 MMOL/L
WBC # BLD AUTO: 7.76 K/UL

## 2018-02-09 PROCEDURE — 96361 HYDRATE IV INFUSION ADD-ON: CPT

## 2018-02-09 PROCEDURE — 80053 COMPREHEN METABOLIC PANEL: CPT

## 2018-02-09 PROCEDURE — 96374 THER/PROPH/DIAG INJ IV PUSH: CPT

## 2018-02-09 PROCEDURE — 63600175 PHARM REV CODE 636 W HCPCS: Performed by: EMERGENCY MEDICINE

## 2018-02-09 PROCEDURE — 99283 EMERGENCY DEPT VISIT LOW MDM: CPT | Mod: 25

## 2018-02-09 PROCEDURE — 85025 COMPLETE CBC W/AUTO DIFF WBC: CPT

## 2018-02-09 PROCEDURE — 25000003 PHARM REV CODE 250: Performed by: EMERGENCY MEDICINE

## 2018-02-09 RX ADMIN — LORAZEPAM 1 MG: 2 INJECTION INTRAMUSCULAR; INTRAVENOUS at 10:02

## 2018-02-09 RX ADMIN — SODIUM CHLORIDE 1000 ML: 0.9 INJECTION, SOLUTION INTRAVENOUS at 10:02

## 2018-02-09 NOTE — TELEPHONE ENCOUNTER
"Admit 12/28    Reason for Disposition   [1] Tingling (e.g., pins and needles) of the face, arm / hand, or leg / foot on one side of the body AND [2] present now    Answer Assessment - Initial Assessment Questions  1. SYMPTOM: "What is the main symptom you are concerned about?" (e.g., weakness, numbness)     Spouse states that pt is shaking a lot for past couple of days. Withdrawal off med? Takes lasix and potassium. Facial droop form birth. 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 Edema of both legs. + SOB at rest- baseline. Spouse states pt is hard to understand. Ulcers all over lips. given valtrex in ER.   2. ONSET: "When did this start?" (minutes, hours, days; while sleeping)     2/6  3. LAST NORMAL: "When was the last time you were normal (no symptoms)?"     2/5 ok then   4. PATTERN "Does this come and go, or has it been constant since it started?"  "Is it present now?"    Constant when up - goes away when sleeping. Not sleeping well.   5. CARDIAC SYMPTOMS: "Have you had any of the following symptoms: chest pain, difficulty breathing, palpitations?"    No CP   6. NEUROLOGIC SYMPTOMS: "Have you had any of the following symptoms: headache, dizziness, vision loss, double vision, changes in speech, unsteady on your feet?"   had HA- hx migraines. Change in speech - tremors preventing her from speak better   7. OTHER SYMPTOMS: "Do you have any other symptoms?"    Wants appt with IM clinic. afeb    Protocols used: ST NEUROLOGIC DEFICIT-A-AH  RA/back problems- uses walker. Urgent message sent to PCP for appt re tremor. call back with questions.     "

## 2018-02-10 RX ORDER — LORAZEPAM 1 MG/1
1 TABLET ORAL EVERY 6 HOURS PRN
Qty: 10 TABLET | Refills: 0 | Status: SHIPPED | OUTPATIENT
Start: 2018-02-10 | End: 2018-02-12 | Stop reason: SDUPTHER

## 2018-02-10 RX ORDER — TRAZODONE HYDROCHLORIDE 50 MG/1
TABLET ORAL
Qty: 90 TABLET | Refills: 2 | Status: SHIPPED | OUTPATIENT
Start: 2018-02-10 | End: 2018-02-12

## 2018-02-10 NOTE — ED NOTES
Patient identifiers for Tasha Hawley checked and correct.  LOC: The patient is awake, alert and aware of environment with an appropriate affect, the patient is oriented x 3 and speaking appropriately.  APPEARANCE: Jerking. Patient uncomfortable and in no acute distress.    SKIN: The skin is warm and dry. Sores to lips. Arrived with suprapubic catheter in place.  MUSKULOSKELETAL: Patient moving all extremities well, swelling to bilateral lower extremities.  RESPIRATORY: Airway is open and patent, respirations are spontaneous, patient has a normal effort and rate.  NEUROLOGIC: PERRL, facial expression is symmetrical, bilateral hand grasp equal and even, normal sensation in all extremities when touched with a finger.

## 2018-02-10 NOTE — ED PROVIDER NOTES
NAME:  Tasha Hawley  CSN:     [id number]  MRN:    [id number]  ADMIT DATE: 2/9/2018        eMERGENCY dEPARTMENT eNCOUnter    CHIEF COMPLAINT    Chief Complaint   Patient presents with    Tremors     was seen in ED a few days ago for cold sores; today presents with tremors; also c/o bilateral leg pain and headache;       HPI      Tasha Hawley is a 62 y.o. female who presents to the ED for evaluation of tremulousness.  Patient states that she was here a few days ago for cold sores and was sent home with Valtrex.  Patient has been suffering from insomnia as well and has had multiple medications adjusted recently such as Seroquel and trazodone.  Today the patient has been suffering from repeated muscle spasms which is causing her distress due to exacerbating her back pain.  The patient and her  reports this is not the first occasion she has suffered from these uncontrollable chorea-like movements and that is usually occurs when her potassium is low.        ALLERGIES    Review of patient's allergies indicates:   Allergen Reactions    (d)-limonene flavor      Other reaction(s): difficult intubation  Other reaction(s): Difficulty breathing    Bactrim [sulfamethoxazole-trimethoprim] Anaphylaxis    Imitrex [sumatriptan succinate] Shortness Of Breath    Penicillins Shortness Of Breath     Other reaction(s): Jittery    Topamax [topiramate] Shortness Of Breath    Vancomycin Shortness Of Breath     Rash    Butorphanol tartrate     Darvocet a500 [propoxyphene n-acetaminophen]      Other reaction(s): Difficulty breathing    Fentanyl      Other reaction(s): Vomiting  Other reaction(s): Nausea  Other reaction(s): Itching  swelling    White petrolatum-zinc     Zinc oxide-white petrolatum      Other reaction(s): Difficulty breathing    Latex, natural rubber Itching and Rash       PAST MEDICAL HISTORY  Past Medical History:   Diagnosis Date    Anxiety     Arthritis     Bilateral lower extremity edema      severe chronic    Carotid artery occlusion     Cataract     Depression     Fever blister     Hypothyroid     Iron deficiency anemia     Lumbar radiculopathy     with chronic pain    Ocular migraine     Sleep apnea     cpap       SURGICAL HISTORY    Past Surgical History:   Procedure Laterality Date    BACK SURGERY      x 12    CATARACT EXTRACTION W/  INTRAOCULAR LENS IMPLANT Left     Dr Coleman     HYSTERECTOMY  1975    endometriosis    pain pump placement      SQ Dilaudid Pump managed by Dr. Hillman, Pain Management    SPINAL CORD STIMULATOR REMOVAL      before [facility]    SPINE SURGERY  5-13-13    CERVICAL FUSION       SOCIAL HISTORY    Social History     Social History    Marital status:      Spouse name: N/A    Number of children: N/A    Years of education: N/A     Social History Main Topics    Smoking status: Never Smoker    Smokeless tobacco: Never Used    Alcohol use No      Comment: denies    Drug use: No    Sexual activity: No     Other Topics Concern    None     Social History Narrative    None       FAMILY HISTORY    Family History   Problem Relation Age of Onset    Cancer Mother 55     breast    Cancer Father      esophagus,had laryngectomy    Esophageal cancer Father     Parkinsonism Maternal Grandmother     Tremor Maternal Grandmother     No Known Problems Brother     No Known Problems Brother     Heart disease Maternal Uncle     No Known Problems Sister     No Known Problems Maternal Aunt     No Known Problems Paternal Aunt     No Known Problems Paternal Uncle     No Known Problems Maternal Grandfather     No Known Problems Paternal Grandmother     No Known Problems Paternal Grandfather     Melanoma Neg Hx     Amblyopia Neg Hx     Blindness Neg Hx     Cataracts Neg Hx     Diabetes Neg Hx     Glaucoma Neg Hx     Hypertension Neg Hx     Macular degeneration Neg Hx     Retinal detachment Neg Hx     Strabismus Neg Hx     Stroke Neg Hx     Thyroid  "disease Neg Hx     Colon cancer Neg Hx        REVIEW OF SYSTEMS   ROS  All Systems otherwise negative except as noted in the History of Present Illness.        PHYSICAL EXAM    Reviewed Triage Note  VITAL SIGNS:   ED Triage Vitals   Enc Vitals Group      BP 02/09/18 2245 (!) 88/35      Pulse 02/09/18 1849 63      Resp 02/09/18 1849 18      Temp 02/09/18 1849 96.9 °F (36.1 °C)      Temp src 02/09/18 1849 Oral      SpO2 02/09/18 1849 98 %      Weight 02/09/18 1849 155 lb      Height 02/09/18 1849 5' 3"      Head Circumference --       Peak Flow --       Pain Score --       Pain Loc --       Pain Edu? --       Excl. in GC? --        Patient Vitals for the past 24 hrs:   BP Temp Temp src Pulse Resp SpO2 Height Weight   02/09/18 2358 - 98.6 °F (37 °C) Oral - - - - -   02/09/18 2352 (!) 87/40 - - (!) 59 - 100 % - -   02/09/18 2318 (!) 87/55 - - 60 - 100 % - -   02/09/18 2250 (!) 93/56 - - - - 100 % - -   02/09/18 2245 (!) 88/35 - - - - - - -   02/09/18 2244 - 98.5 °F (36.9 °C) Oral - - - - -   02/09/18 1849 - 96.9 °F (36.1 °C) Oral 63 18 98 % 5' 3" (1.6 m) 70.3 kg (155 lb)           Physical Exam    Constitutional:  Anxious but otherwise well appearing  HENT:  Normocephalic, atraumatic. Multiple herpetic lesions noted to her lower lip, no intraoral ulcerations  Eyes:  EOMI. Conjunctiva normal without discharge.   Neck: Normal range of motion. No stridor. No meningismus. No lymphadenopathy.   Respiratory:  Normal breath sounds bilaterally.  No respiratory distress, retractions, or conversational dyspnea. No wheezing. No rhonchi. No rales.   Cardiovascular:  Normal heart rate. Normal rhythm. No pitting lower extremity edema.   GI:  Abdomen soft, non-distended, non-tender. Normal bowel sounds. No guarding, rigidity or rebound.     Musculoskeletal:  Non tender extremities, no bony deformities   Integument:  Warm and dry. No rash.  Neurologic:  Cn 2-12 appear wnl, frequent diffuse chorea noted, moving all " extremities  Psychiatric:  Anxious        LABS  Pertinent labs reviewed. (See chart for details)   Labs Reviewed   CBC W/ AUTO DIFFERENTIAL - Abnormal; Notable for the following:        Result Value    RBC 3.78 (*)     Hemoglobin 10.3 (*)     Hematocrit 33.8 (*)     MCHC 30.5 (*)     All other components within normal limits   COMPREHENSIVE METABOLIC PANEL - Abnormal; Notable for the following:     CO2 32 (*)     Albumin 3.4 (*)     ALT 8 (*)     Anion Gap 7 (*)     All other components within normal limits         RADIOLOGY    Imaging Results    None         PROCEDURES    Procedures      EKG     Interpreted by ERP:         ED COURSE & MEDICAL DECISION MAKING    Pertinent & Imaging studies reviewed. (See chart for details and specific orders.)        Medications   lorazepam (ATIVAN) injection 1 mg (1 mg Intravenous Given 2/9/18 2204)   sodium chloride 0.9% bolus 1,000 mL (0 mLs Intravenous Stopped 2/9/18 2315)       ED Course      The patient feels better after a dose of Ativan.  Her blood pressure is somewhat low for an unclear reason but the patient feels well.  She is afebrile with a normal white count.  She has no complaints other than the chorea and associated exacerbation of her back pain.  After the Ativan the patient is comfortable without any complaints.  I suspect her chorea is drug-induced from the recent adjustments to her psychotropic medications. I will send her home with a small supply of Ativan and instruct to follow up with her PMD for medical management.        DISPOSITION  Patient discharged in stable condition at No discharge date for patient encounter.      DISCHARGE INSTRUCTIONS & MEDS     Tasha Hawley   Home Medication Instructions [facility]:[id number]    Printed on:02/09/18 0640   Medication Information                      ascorbic acid, vitamin C, (VITAMIN C) 500 mg Chew  Take 1 tablet by mouth 3 (three) times daily.             aspirin (ECOTRIN) 81 MG EC tablet  Take 1 tablet (81 mg  total) by mouth once daily.             atorvastatin (LIPITOR) 80 MG tablet  TAKE ONE TABLET BY MOUTH EVERY DAY.             DOC-Q-LACE 100 mg capsule  TAKE ONE CAPSULE BY MOUTH THREE TIMES A DAY AS NEEDED FOR CONSTIPATION             docosanol (ABREVA) 10 % Crea  Apply 1 application topically 2 (two) times daily.             FLUoxetine (PROZAC) 20 MG capsule  Take 3 capsules (60 mg total) by mouth once daily.             furosemide (LASIX) 40 MG tablet  Take 1 tablet (40 mg total) by mouth 2 (two) times daily.             hydrocodone-acetaminophen 10-325mg (NORCO)  mg Tab  Take 2 tablets by mouth every 4 (four) hours as needed for Pain.              hydrOXYzine HCl (ATARAX) 25 MG tablet  Take 1 tablet (25 mg total) by mouth 3 (three) times daily as needed for Itching.             levothyroxine (SYNTHROID) 112 MCG tablet  Take 1 tablet (112 mcg total) by mouth before breakfast.             methenamine (HIPREX) 1 gram Tab  Take 1 tablet (1 g total) by mouth 2 (two) times daily.             ondansetron (ZOFRAN) 8 MG tablet  TAKE ONE TABLET BY MOUTH EVERY TWELVE HOURS AS NEEDED FOR NAUSEA             oxybutynin (DITROPAN XL) 15 MG TR24  Take 1 tablet (15 mg total) by mouth once daily.             pantoprazole (PROTONIX) 40 MG tablet  Take 1 tablet (40 mg total) by mouth once daily.             potassium chloride SA (K-DUR,KLOR-CON) 20 MEQ tablet  Take 2 tablets (40 mEq total) by mouth 2 (two) times daily.             potassium citrate (UROCIT-K) 5 mEq (540 mg) TbSR  Take 1 tablet (5 mEq total) by mouth 3 (three) times daily with meals.             ranitidine (ZANTAC) 150 MG capsule  Take 1 capsule (150 mg total) by mouth 2 (two) times daily.             SENNOSIDES (SENNA LAX ORAL)  Take 20 mg by mouth every evening.              traZODone (DESYREL) 100 MG tablet  Take 2 tablets (200 mg total) by mouth every evening.             valACYclovir (VALTREX) 1000 MG tablet  Take 1 tablet (1,000 mg total) by mouth 3  (three) times daily.                   New Prescriptions    LORAZEPAM (ATIVAN) 1 MG TABLET    Take 1 tablet (1 mg total) by mouth every 6 (six) hours as needed (muscle spasm).           FINAL IMPRESSION    1. Drug-induced chorea              Blood Pressure Follow-Up Advised  Patient advised to follow up with PCP within 3-5 days for blood pressure re-check if blood pressure is equal to or greater than 120/80.         Critical care time spent with this patient (not including separately billable items) was  0 minutes.     DISCLAIMER: This note was prepared with Dragon NaturallySpeaking voice recognition transcription software. Garbled syntax, mangled pronouns, and other bizarre constructions may be attributed to that software system.      Kalpesh Maurice MD  02/10/2018  11:13 PM          Kalpesh Maurice MD  02/10/18 0010

## 2018-02-12 ENCOUNTER — OFFICE VISIT (OUTPATIENT)
Dept: INTERNAL MEDICINE | Facility: CLINIC | Age: 63
End: 2018-02-12
Payer: MEDICARE

## 2018-02-12 ENCOUNTER — TELEPHONE (OUTPATIENT)
Dept: INTERNAL MEDICINE | Facility: CLINIC | Age: 63
End: 2018-02-12

## 2018-02-12 VITALS
BODY MASS INDEX: 26.12 KG/M2 | DIASTOLIC BLOOD PRESSURE: 62 MMHG | HEIGHT: 64 IN | OXYGEN SATURATION: 96 % | HEART RATE: 54 BPM | SYSTOLIC BLOOD PRESSURE: 126 MMHG | WEIGHT: 153 LBS

## 2018-02-12 DIAGNOSIS — G95.9 CERVICAL MYELOPATHY: ICD-10-CM

## 2018-02-12 DIAGNOSIS — M47.816 FACET ARTHROPATHY, LUMBAR: ICD-10-CM

## 2018-02-12 DIAGNOSIS — F33.0 MAJOR DEPRESSIVE DISORDER, RECURRENT, MILD: ICD-10-CM

## 2018-02-12 DIAGNOSIS — R25.1 TREMORS OF NERVOUS SYSTEM: Primary | ICD-10-CM

## 2018-02-12 DIAGNOSIS — F41.1 GENERALIZED ANXIETY DISORDER: ICD-10-CM

## 2018-02-12 PROCEDURE — 3008F BODY MASS INDEX DOCD: CPT | Mod: S$GLB,,, | Performed by: INTERNAL MEDICINE

## 2018-02-12 PROCEDURE — 99214 OFFICE O/P EST MOD 30 MIN: CPT | Mod: S$GLB,,, | Performed by: INTERNAL MEDICINE

## 2018-02-12 PROCEDURE — 99999 PR PBB SHADOW E&M-EST. PATIENT-LVL V: CPT | Mod: PBBFAC,,, | Performed by: INTERNAL MEDICINE

## 2018-02-12 PROCEDURE — 99499 UNLISTED E&M SERVICE: CPT | Mod: S$GLB,,, | Performed by: INTERNAL MEDICINE

## 2018-02-12 RX ORDER — POTASSIUM CHLORIDE 750 MG/1
TABLET, EXTENDED RELEASE ORAL
COMMUNITY
Start: 2018-02-11 | End: 2018-03-08

## 2018-02-12 RX ORDER — LORAZEPAM 1 MG/1
1 TABLET ORAL EVERY 8 HOURS PRN
Qty: 20 TABLET | Refills: 0 | Status: SHIPPED | OUTPATIENT
Start: 2018-02-12 | End: 2018-03-08

## 2018-02-12 RX ORDER — METHENAMINE MANDELATE 500 MG/1
TABLET ORAL
COMMUNITY
Start: 2018-02-08 | End: 2018-02-23 | Stop reason: CLARIF

## 2018-02-12 NOTE — TELEPHONE ENCOUNTER
----- Message from Adriana Fishman sent at 2/12/2018 10:21 AM CST -----  Contact: Baldemar Hedrickie  [phone]  Pharmacy is calling to clarify an RX.  RX name: Trazadone  What do they need to clarify:  Dosage   Comments: Patient has 2 R'xs for same medication different dosages and need to find out which one is correct.

## 2018-02-12 NOTE — PROGRESS NOTES
Subjective:       Patient ID: Tasha Hawley is a 62 y.o. female.    Chief Complaint: Follow-up (ED Follow up Tremors)     HPI:  Patient is seen with her  with complaints of tremors.  She says she went to the ER because she had tremors and fever blisters.  She was concerned that Remeron had caused this.  She has a tremendous problem with insomnia and her PCP has been changing some meds.  Apparently another doctor at our [facility] switched Seroquel to Remeron fearing Seroquel was causing side effects however I see the patient has an appointment with our movement disorder specialist in Neurology from December and that visit said tremors so this is apparently not new.  What was interesting was the patient stated she went to the emergency room over the weekend for tremors and they had her stop the Remeron.  They gave her a shot of lorazepam and some tablets.  She said it helped significantly but she did not take much of the lorazepam over the weekend but had some tremors last night and this morning.  She was not having any significant tremors seated in the office but when I asked her to do some physical exam maneuvers it seemed to bring these out.  She denies any GI or  complaints.  She does have a history of arthritis of the neck and low back.  She said she was told to go back to the trazodone for daily but that isn't helping with sleep.  I think it is reasonable to try the lorazepam a little longer.  If that helps her symptoms and a fade it may have been from Seroquel or Remeron.  If not, it may be an underlying condition.      Review of Systems   Constitutional: Negative for appetite change, chills and fever.   HENT: Negative for nosebleeds, sinus pain and sore throat.    Eyes: Negative for visual disturbance.   Respiratory: Negative for cough, shortness of breath and wheezing.    Cardiovascular: Negative for chest pain and leg swelling.   Gastrointestinal: Negative for abdominal pain, constipation  and diarrhea.   Genitourinary: Negative for difficulty urinating and hematuria.   Musculoskeletal: Positive for arthralgias, back pain, gait problem and neck stiffness. Negative for neck pain.   Skin: Negative for pallor and rash.   Neurological: Positive for tremors (  Hands Only when outstretched, shoulders) and facial asymmetry (since birth). Negative for headaches.   Psychiatric/Behavioral: Negative for dysphoric mood and suicidal ideas. The patient is not nervous/anxious.        Objective:      Physical Exam   Constitutional: She is oriented to person, place, and time. She appears well-developed and well-nourished.   HENT:   Head: Normocephalic and atraumatic.   Eyes: EOM are normal. Pupils are equal, round, and reactive to light.   Neck: No thyromegaly present.   Cardiovascular: Normal rate and regular rhythm.    Pulmonary/Chest: Effort normal and breath sounds normal.   Abdominal: Soft. Bowel sounds are normal.   Neurological: She is alert and oriented to person, place, and time.   Hand shoulder and head choreiform movements only during physical exam.  When seated and conversing for 15-20 minutes patient had no tremors or head movements.       Assessment:       1. Tremors of nervous system    2. Generalized anxiety disorder    3. Major depressive disorder, recurrent, mild    4. Cervical myelopathy    5. Facet arthropathy, lumbar        Plan:       Tasha was seen today for follow-up.    Diagnoses and all orders for this visit:    Tremors of nervous system    Generalized anxiety disorder    Major depressive disorder, recurrent, mild  Comments:  Continue to see Psychiatry    Cervical myelopathy  Comments:  Clinically stable. Stable pain      Facet arthropathy, lumbar  Comments:  Clinically stable, continue to monitor.    Other orders  -     LORazepam (ATIVAN) 1 MG tablet; Take 1 tablet (1 mg total) by mouth every 8 (eight) hours as needed (muscle spasm).         Chronic versus recently flared up because of  medicine.  Continue p.r.n use of low dose Lorazepam. Side effects discussed. Follow up after above and keep Neurology appointment.

## 2018-02-12 NOTE — TELEPHONE ENCOUNTER
Pharmacy called for clarification.  THey have two trazodone prescriptions, one by me and one from ED .  She was seen in ED  Three days ago.  I asked pharmacy to discontinue my prescription and fill ED prescription.

## 2018-02-12 NOTE — TELEPHONE ENCOUNTER
Spoke to pharmacist and advised that the correct does for the Trazodone is take 2 tabs nightly as indicated in the chart.

## 2018-02-18 RX ORDER — LORAZEPAM 1 MG/1
TABLET ORAL
Qty: 20 TABLET | Refills: 0 | Status: CANCELLED | OUTPATIENT
Start: 2018-02-18

## 2018-02-19 ENCOUNTER — TELEPHONE (OUTPATIENT)
Dept: INTERNAL MEDICINE | Facility: CLINIC | Age: 63
End: 2018-02-19

## 2018-02-19 ENCOUNTER — OFFICE VISIT (OUTPATIENT)
Dept: UROLOGY | Facility: CLINIC | Age: 63
End: 2018-02-19
Payer: MEDICARE

## 2018-02-19 VITALS — SYSTOLIC BLOOD PRESSURE: 92 MMHG | DIASTOLIC BLOOD PRESSURE: 68 MMHG | HEART RATE: 51 BPM

## 2018-02-19 DIAGNOSIS — R32 URINARY INCONTINENCE, UNSPECIFIED TYPE: ICD-10-CM

## 2018-02-19 DIAGNOSIS — Z43.5 ENCOUNTER FOR SUPRAPUBIC CATHETER CARE: ICD-10-CM

## 2018-02-19 DIAGNOSIS — R10.9 ABDOMINAL PAIN, UNSPECIFIED ABDOMINAL LOCATION: Primary | ICD-10-CM

## 2018-02-19 DIAGNOSIS — R82.90 MALODOROUS URINE: ICD-10-CM

## 2018-02-19 PROCEDURE — 3008F BODY MASS INDEX DOCD: CPT | Mod: S$GLB,,, | Performed by: PHYSICIAN ASSISTANT

## 2018-02-19 PROCEDURE — 99999 PR PBB SHADOW E&M-EST. PATIENT-LVL IV: CPT | Mod: PBBFAC,,, | Performed by: PHYSICIAN ASSISTANT

## 2018-02-19 PROCEDURE — 87088 URINE BACTERIA CULTURE: CPT

## 2018-02-19 PROCEDURE — 87077 CULTURE AEROBIC IDENTIFY: CPT

## 2018-02-19 PROCEDURE — 99499 UNLISTED E&M SERVICE: CPT | Mod: S$GLB,,, | Performed by: PHYSICIAN ASSISTANT

## 2018-02-19 PROCEDURE — 51705 CHANGE OF BLADDER TUBE: CPT | Mod: S$GLB,,, | Performed by: PHYSICIAN ASSISTANT

## 2018-02-19 PROCEDURE — 87086 URINE CULTURE/COLONY COUNT: CPT

## 2018-02-19 PROCEDURE — 87186 SC STD MICRODIL/AGAR DIL: CPT

## 2018-02-19 PROCEDURE — 99214 OFFICE O/P EST MOD 30 MIN: CPT | Mod: 25,S$GLB,, | Performed by: PHYSICIAN ASSISTANT

## 2018-02-19 RX ORDER — LORAZEPAM 1 MG/1
1 TABLET ORAL EVERY 8 HOURS PRN
Qty: 20 TABLET | Refills: 0 | Status: CANCELLED | OUTPATIENT
Start: 2018-02-19 | End: 2018-03-21

## 2018-02-19 NOTE — TELEPHONE ENCOUNTER
----- Message from Maria Esther Winter sent at 2/19/2018  7:57 AM CST -----  Contact: Self Call Home: [phone]  if no answer call Mobile [phone]  You gave her two different strength of her thyroid medications and she wanted to know why and if she needed to take both,  Levothyroxine (SYNTHROID) 112 MCG tablet and one for 125 mg.      Please call her.         Patient would like to get medical advice.  Symptoms (please be specific):  Diarrhea, ear pain and runny nose  How long has patient had these symptoms:  1 week   Pharmacy name and phone #:  [facility]  [phone] (Phone) or [phone] (Fax)  Any drug allergies:  Check chart to many to list  Comments:

## 2018-02-19 NOTE — PROGRESS NOTES
CHIEF COMPLAINT:    Mrs. Hawley is a 62 y.o. female presenting for abdominal pain, urinary incontinence, and malodorous urine.  PRESENTING ILLNESS:    Tasha Hawley is a 62 y.o. female with a PMH of urinary incontinence, neurogenic bladder, who presents for abdominal pain, urinary incontinence, and malodorous urine.  Her symptoms started last week.  She reports soreness around the sp tube site.  Her catheter drains well at night.  She reports urinary incontinence mostly during the day.  Her  irrigates her catheter to make sure it is draining.  She was seen earlier this month for an UTI.  She was treated by her pcp with a rocephin injection.  Her bladder pain resolved.     She is due for her next catheter change next week.    She states that she can tolerate keflex ad does not cause any side effects.     REVIEW OF SYSTEMS:    Constitutional: Negative for fever and chills.   HENT: Negative for hearing loss.   Eyes: Negative for visual disturbance.   Respiratory: Negative for shortness of breath.   Cardiovascular: Negative for chest pain.   Gastrointestinal: Negative for nausea, vomiting, and constipation.   Genitourinary:  Positive for  incomplete bladder emptying.   Neurological: Negative for dizziness.   Hematological: Does not bruise/bleed easily.   Psychiatric/Behavioral: Negative for confusion.     PATIENT HISTORY:    Past Medical History:   Diagnosis Date    Anxiety     Arthritis     Bilateral lower extremity edema     severe chronic    Carotid artery occlusion     Cataract     Depression     Fever blister     Hypothyroid     Iron deficiency anemia     Lumbar radiculopathy     with chronic pain    Ocular migraine     Sleep apnea     cpap       Past Surgical History:   Procedure Laterality Date    BACK SURGERY      x 12    CATARACT EXTRACTION W/  INTRAOCULAR LENS IMPLANT Left     Dr Coleman     HYSTERECTOMY  1975    endometriosis    pain pump placement      SQ Dilaudid Pump managed  by Dr. Hillman, Pain Management    SPINAL CORD STIMULATOR REMOVAL      before [facility]    SPINE SURGERY  5-13-13    CERVICAL FUSION       Family History   Problem Relation Age of Onset    Cancer Mother 55     breast    Cancer Father      esophagus,had laryngectomy    Esophageal cancer Father     Parkinsonism Maternal Grandmother     Tremor Maternal Grandmother     No Known Problems Brother     No Known Problems Brother     Heart disease Maternal Uncle     No Known Problems Sister     No Known Problems Maternal Aunt     No Known Problems Paternal Aunt     No Known Problems Paternal Uncle     No Known Problems Maternal Grandfather     No Known Problems Paternal Grandmother     No Known Problems Paternal Grandfather     Melanoma Neg Hx     Amblyopia Neg Hx     Blindness Neg Hx     Cataracts Neg Hx     Diabetes Neg Hx     Glaucoma Neg Hx     Hypertension Neg Hx     Macular degeneration Neg Hx     Retinal detachment Neg Hx     Strabismus Neg Hx     Stroke Neg Hx     Thyroid disease Neg Hx     Colon cancer Neg Hx        Social History     Social History    Marital status:      Spouse name: N/A    Number of children: N/A    Years of education: N/A     Occupational History    Not on file.     Social History Main Topics    Smoking status: Never Smoker    Smokeless tobacco: Never Used    Alcohol use No      Comment: denies    Drug use: No    Sexual activity: No     Other Topics Concern    Not on file     Social History Narrative    No narrative on file       Allergies:  (d)-limonene flavor; Bactrim [sulfamethoxazole-trimethoprim]; Imitrex [sumatriptan succinate]; Penicillins; Topamax [topiramate]; Vancomycin; Butorphanol tartrate; Darvocet a500 [propoxyphene n-acetaminophen]; Fentanyl; White petrolatum-zinc; Zinc oxide-white petrolatum; and Latex, natural rubber    Medications:    Current Outpatient Prescriptions:     ascorbic acid, vitamin C, (VITAMIN C) 500 mg Chew, Take 1  tablet by mouth 3 (three) times daily., Disp: 90 each, Rfl: 11    aspirin (ECOTRIN) 81 MG EC tablet, Take 1 tablet (81 mg total) by mouth once daily., Disp: , Rfl: 0    atorvastatin (LIPITOR) 80 MG tablet, TAKE ONE TABLET BY MOUTH EVERY DAY. (Patient taking differently: TAKE ONE TABLET BY MOUTH Nightly), Disp: 90 tablet, Rfl: 3    DOC-Q-LACE 100 mg capsule, TAKE ONE CAPSULE BY MOUTH THREE TIMES A DAY AS NEEDED FOR CONSTIPATION, Disp: 90 capsule, Rfl: 3    docosanol (ABREVA) 10 % Crea, Apply 1 application topically 2 (two) times daily., Disp: 2 g, Rfl: 0    FLUoxetine (PROZAC) 20 MG capsule, Take 3 capsules (60 mg total) by mouth once daily., Disp: 90 capsule, Rfl: 2    furosemide (LASIX) 40 MG tablet, Take 1 tablet (40 mg total) by mouth 2 (two) times daily., Disp: 60 tablet, Rfl: 11    hydrocodone-acetaminophen 10-325mg (NORCO)  mg Tab, Take 2 tablets by mouth every 4 (four) hours as needed for Pain. , Disp: , Rfl:     hydrOXYzine HCl (ATARAX) 25 MG tablet, Take 1 tablet (25 mg total) by mouth 3 (three) times daily as needed for Itching., Disp: 30 tablet, Rfl: 1    levothyroxine (SYNTHROID) 112 MCG tablet, Take 1 tablet (112 mcg total) by mouth before breakfast., Disp: 90 tablet, Rfl: 3    LORazepam (ATIVAN) 1 MG tablet, Take 1 tablet (1 mg total) by mouth every 8 (eight) hours as needed (muscle spasm)., Disp: 20 tablet, Rfl: 0    methenamine (HIPREX) 1 gram Tab, Take 1 tablet (1 g total) by mouth 2 (two) times daily., Disp: 60 tablet, Rfl: 11    methenamine mandelate (MANDELAMINE) 0.5 g tablet, , Disp: , Rfl:     ondansetron (ZOFRAN) 8 MG tablet, TAKE ONE TABLET BY MOUTH EVERY TWELVE HOURS AS NEEDED FOR NAUSEA, Disp: 60 tablet, Rfl: 2    oxybutynin (DITROPAN XL) 15 MG TR24, Take 1 tablet (15 mg total) by mouth once daily., Disp: 30 tablet, Rfl: 6    pantoprazole (PROTONIX) 40 MG tablet, Take 1 tablet (40 mg total) by mouth once daily., Disp: 90 tablet, Rfl: 3    potassium chloride (KLOR-CON)  10 MEQ TbSR, , Disp: , Rfl:     potassium chloride SA (K-DUR,KLOR-CON) 20 MEQ tablet, Take 2 tablets (40 mEq total) by mouth 2 (two) times daily., Disp: 120 tablet, Rfl: 11    potassium citrate (UROCIT-K) 5 mEq (540 mg) TbSR, Take 1 tablet (5 mEq total) by mouth 3 (three) times daily with meals., Disp: 90 tablet, Rfl: 11    ranitidine (ZANTAC) 150 MG capsule, Take 1 capsule (150 mg total) by mouth 2 (two) times daily., Disp: 60 capsule, Rfl: 2    SENNOSIDES (SENNA LAX ORAL), Take 20 mg by mouth every evening. , Disp: , Rfl:     traZODone (DESYREL) 100 MG tablet, Take 2 tablets (200 mg total) by mouth every evening., Disp: 120 tablet, Rfl: 4    valACYclovir (VALTREX) 1000 MG tablet, Take 1 tablet (1,000 mg total) by mouth 3 (three) times daily., Disp: 21 tablet, Rfl: 0    PHYSICAL EXAMINATION:    Constitutional: She appears well-developed and well-nourished.  She is in no apparent distress.    Eyes: No scleral icterus noted bilaterally.  No discharge noted bilaterally.    Cardiovascular: Normal rate.  No pitting edema noted in lower extremities bilaterally    Pulmonary/Chest: Effort normal. No respiratory distress.     Abdominal:  She exhibits no distension.      Lymphadenopathy:          Right: No supraclavicular adenopathy present.        Left: No supraclavicular adenopathy present.     Neurological: She is alert and oriented to person, place, and time.     Skin: Skin is warm and dry.     Psych: Cooperative with normal affect.      Physical Exam      LABS:    IMPRESSION:    Encounter Diagnoses   Name Primary?    Abdominal pain, unspecified abdominal location Yes    Malodorous urine     Urinary incontinence, unspecified type     Encounter for suprapubic catheter care        PLAN:  I spent 25 minutes with the patient of which more than half was spent in direct consultation with the patient in regards to our treatment and plan.    Urine culture. (collected from new sp tube)  Discussed that her urine will  always have bacteria in it and it is only recommended to treat is she is having symptoms.  Discussed how repeat antibiotic use can lead to drug resistance and that is why treatment is only indicated when symptomatic.  Patient is also allergic to a lot of medications and as a reduces the potential antibiotics that can be used to treat an infection.    May drink lemon water.    27cc sterile water was removed to deflate the balloon.   A 20fr silicone was removed.  A new 20 fr silver coated silicone catheter was placed.  30cc of sterile water was used to inflate the balloon.  Yellow urine was noted draining from catheter.  The catheter was connected to the leg bag and secured to her leg.          Bryanna Sultana PA-C

## 2018-02-20 ENCOUNTER — TELEPHONE (OUTPATIENT)
Dept: INTERNAL MEDICINE | Facility: CLINIC | Age: 63
End: 2018-02-20

## 2018-02-20 NOTE — TELEPHONE ENCOUNTER
----- Message from Laine Fang sent at 2/20/2018  3:27 PM CST -----  Contact: patient [phone]  Pt needs to speak with you about her ears/throat and also needs to discuss something about her medication. Pt said that this is her second call.

## 2018-02-21 ENCOUNTER — TELEPHONE (OUTPATIENT)
Dept: INTERNAL MEDICINE | Facility: CLINIC | Age: 63
End: 2018-02-21

## 2018-02-21 LAB — BACTERIA UR CULT: NORMAL

## 2018-02-21 NOTE — TELEPHONE ENCOUNTER
----- Message from Lisa Jones sent at 2/21/2018  9:19 AM CST -----  Contact: self/[phone]  Patient is returning a phone call.  Who left a message for the patient: Pt did not say  Does patient know what this is regarding:  no  Comments: Pt stated she had seen the Dr earlier this week, and would like a call back.

## 2018-02-21 NOTE — TELEPHONE ENCOUNTER
Spoke to patient she needed clarification on her synthroid medication. I advised her she should be taking 112 mc.

## 2018-02-22 DIAGNOSIS — A49.9 BACTERIAL UTI: Primary | ICD-10-CM

## 2018-02-22 DIAGNOSIS — N39.0 BACTERIAL UTI: Primary | ICD-10-CM

## 2018-02-22 RX ORDER — CEPHALEXIN 500 MG/1
500 CAPSULE ORAL EVERY 12 HOURS
Qty: 14 CAPSULE | Refills: 0 | Status: SHIPPED | OUTPATIENT
Start: 2018-02-22 | End: 2018-03-01

## 2018-02-22 NOTE — PROGRESS NOTES
Patient notified of urine culture results.  She states that she tolerates keflex well.  She denies any adverse reactions with it.  She also reports blood clots from the urethra this morning while having a bowel movement.  She also reports blood in her pad.  She denies blood per rectum.  The urine in the bag has a pink tinge to it.  She also states that she will like to discuss urethra bulking agent for urinary incontinence.

## 2018-02-23 ENCOUNTER — TELEPHONE (OUTPATIENT)
Dept: UROLOGY | Facility: CLINIC | Age: 63
End: 2018-02-23

## 2018-02-23 DIAGNOSIS — N39.41 URGE INCONTINENCE OF URINE: Primary | ICD-10-CM

## 2018-02-23 DIAGNOSIS — N39.0 RECURRENT UTI: ICD-10-CM

## 2018-02-23 NOTE — TELEPHONE ENCOUNTER
----- Message from Karoline Land MA sent at 2/23/2018 10:44 AM CST -----  Contact: Home: [phone] Mobile: [phone]   states that her S/P tube continues leaking     Home: [phone] Mobile: [phone]

## 2018-02-23 NOTE — TELEPHONE ENCOUNTER
Received a notice from [facility] that methenamine mandelate is not covered but I prescribed methenamine hippurate which is.      On her chart, methenamine mandalate was ordered by an outside physician.  Removed it from the chart.  The hipprex is still there.

## 2018-02-26 RX ORDER — FLUOXETINE HYDROCHLORIDE 20 MG/1
CAPSULE ORAL
Qty: 90 CAPSULE | Refills: 1 | Status: SHIPPED | OUTPATIENT
Start: 2018-02-26 | End: 2018-04-03 | Stop reason: SDUPTHER

## 2018-02-28 ENCOUNTER — OFFICE VISIT (OUTPATIENT)
Dept: PSYCHIATRY | Facility: CLINIC | Age: 63
End: 2018-02-28
Payer: MEDICARE

## 2018-02-28 DIAGNOSIS — F33.0 MAJOR DEPRESSIVE DISORDER, RECURRENT, MILD: Primary | ICD-10-CM

## 2018-02-28 DIAGNOSIS — F41.1 GENERALIZED ANXIETY DISORDER: ICD-10-CM

## 2018-02-28 DIAGNOSIS — R45.4 IRRITABILITY AND ANGER: ICD-10-CM

## 2018-02-28 PROCEDURE — 99999 PR PBB SHADOW E&M-EST. PATIENT-LVL I: CPT | Mod: PBBFAC,,, | Performed by: PSYCHOLOGIST

## 2018-02-28 PROCEDURE — 90834 PSYTX W PT 45 MINUTES: CPT | Mod: S$GLB,,, | Performed by: PSYCHOLOGIST

## 2018-02-28 NOTE — PROGRESS NOTES
Individual Psychotherapy (PhD/LCSW)    2/28/2018    Site:  [facility]         Therapeutic Intervention: Met with patient.  Outpatient - Insight oriented psychotherapy 45 min - CPT code 55782    Chief complaint/reason for encounter: depression, anger and anxiety     Interval history and content of current session:  Tasha arrived on time for her scheduled appointment.  She continues to experience depression and anxiety.  She also has significant sleep problems, which made her drowsy in session.  Her memory is poor and she could not recall what we discussed, even with prompts.  We made a plan for me to type notes about what we talked about for her to take with her to review at home.  She discussed issues with her , poor self-image, and difficulty accepting her current limitations and pain.  She was encouraged to work on acceptance of her current situation (the facts, not her interpretations), and remember that her values do not have to change.  She was reminded that she can still engage in values-based actions, even if she cannot do the same things she used to do.  She will read over the notes at least once every day prior to the next session.    Treatment plan:  · Target symptoms: depression, anxiety   · Why chosen therapy is appropriate versus another modality: relevant to diagnosis  · Outcome monitoring methods: self-report  · Therapeutic intervention type: insight oriented psychotherapy    Risk parameters:  Patient reports no suicidal ideation  Patient reports no homicidal ideation  Patient reports no self-injurious behavior  Patient reports no violent behavior    Verbal deficits: None    Patient's response to intervention:  The patient's response to intervention is accepting.    Progress toward goals and other mental status changes:  The patient's progress toward goals is fair .    Diagnosis:     ICD-10-CM ICD-9-CM   1. Major depressive disorder, recurrent, mild F33.0 296.31   2. Generalized anxiety  disorder F41.1 300.02   3. Irritability and anger R45.4 799.22       Plan:  individual psychotherapy    Return to clinic: 3 weeks    Length of Service (minutes): 45

## 2018-03-08 ENCOUNTER — OFFICE VISIT (OUTPATIENT)
Dept: INTERNAL MEDICINE | Facility: CLINIC | Age: 63
End: 2018-03-08
Payer: MEDICARE

## 2018-03-08 ENCOUNTER — OFFICE VISIT (OUTPATIENT)
Dept: UROLOGY | Facility: CLINIC | Age: 63
End: 2018-03-08
Payer: MEDICARE

## 2018-03-08 ENCOUNTER — TELEPHONE (OUTPATIENT)
Dept: UROLOGY | Facility: CLINIC | Age: 63
End: 2018-03-08

## 2018-03-08 VITALS
BODY MASS INDEX: 30.05 KG/M2 | WEIGHT: 176 LBS | HEIGHT: 64 IN | SYSTOLIC BLOOD PRESSURE: 109 MMHG | DIASTOLIC BLOOD PRESSURE: 45 MMHG | HEART RATE: 49 BPM

## 2018-03-08 VITALS
BODY MASS INDEX: 30.19 KG/M2 | HEART RATE: 46 BPM | SYSTOLIC BLOOD PRESSURE: 104 MMHG | DIASTOLIC BLOOD PRESSURE: 68 MMHG | WEIGHT: 176.81 LBS | HEIGHT: 64 IN

## 2018-03-08 DIAGNOSIS — F41.1 GENERALIZED ANXIETY DISORDER: ICD-10-CM

## 2018-03-08 DIAGNOSIS — F11.20 NARCOTIC DEPENDENCY, CONTINUOUS: Chronic | ICD-10-CM

## 2018-03-08 DIAGNOSIS — M41.9 SCOLIOSIS OF LUMBAR SPINE, UNSPECIFIED SCOLIOSIS TYPE: Chronic | ICD-10-CM

## 2018-03-08 DIAGNOSIS — K59.03 DRUG-INDUCED CONSTIPATION: Chronic | ICD-10-CM

## 2018-03-08 DIAGNOSIS — I89.0 LYMPHEDEMA OF BOTH LOWER EXTREMITIES: Chronic | ICD-10-CM

## 2018-03-08 DIAGNOSIS — R13.10 DYSPHAGIA, UNSPECIFIED TYPE: ICD-10-CM

## 2018-03-08 DIAGNOSIS — G89.4 CHRONIC PAIN SYNDROME: Primary | Chronic | ICD-10-CM

## 2018-03-08 DIAGNOSIS — N39.3 URINARY, INCONTINENCE, STRESS FEMALE: ICD-10-CM

## 2018-03-08 DIAGNOSIS — Z93.59 SUPRAPUBIC CATHETER: ICD-10-CM

## 2018-03-08 DIAGNOSIS — I50.42 CHRONIC COMBINED SYSTOLIC AND DIASTOLIC CONGESTIVE HEART FAILURE: ICD-10-CM

## 2018-03-08 DIAGNOSIS — N31.9 NEUROGENIC BLADDER: Primary | ICD-10-CM

## 2018-03-08 DIAGNOSIS — R33.9 URINARY RETENTION: Chronic | ICD-10-CM

## 2018-03-08 DIAGNOSIS — I25.10 CORONARY ARTERY DISEASE INVOLVING NATIVE CORONARY ARTERY OF NATIVE HEART WITHOUT ANGINA PECTORIS: Chronic | ICD-10-CM

## 2018-03-08 PROBLEM — R00.1 BRADYCARDIA: Status: RESOLVED | Noted: 2017-11-14 | Resolved: 2018-03-08

## 2018-03-08 PROBLEM — R20.0 NUMBNESS AND TINGLING OF LEFT SIDE OF FACE: Status: RESOLVED | Noted: 2017-11-14 | Resolved: 2018-03-08

## 2018-03-08 PROBLEM — E66.3 OVERWEIGHT (BMI 25.0-29.9): Status: RESOLVED | Noted: 2017-11-14 | Resolved: 2018-03-08

## 2018-03-08 PROBLEM — R20.2 NUMBNESS AND TINGLING OF LEFT SIDE OF FACE: Status: RESOLVED | Noted: 2017-11-14 | Resolved: 2018-03-08

## 2018-03-08 PROCEDURE — 99499 UNLISTED E&M SERVICE: CPT | Mod: S$GLB,,, | Performed by: UROLOGY

## 2018-03-08 PROCEDURE — 99999 PR PBB SHADOW E&M-EST. PATIENT-LVL III: CPT | Mod: PBBFAC,,, | Performed by: INTERNAL MEDICINE

## 2018-03-08 PROCEDURE — 99213 OFFICE O/P EST LOW 20 MIN: CPT | Mod: S$GLB,,, | Performed by: UROLOGY

## 2018-03-08 PROCEDURE — 99999 PR PBB SHADOW E&M-EST. PATIENT-LVL III: CPT | Mod: PBBFAC,,, | Performed by: UROLOGY

## 2018-03-08 PROCEDURE — 99499 UNLISTED E&M SERVICE: CPT | Mod: S$GLB,,, | Performed by: INTERNAL MEDICINE

## 2018-03-08 PROCEDURE — 99214 OFFICE O/P EST MOD 30 MIN: CPT | Mod: S$GLB,,, | Performed by: INTERNAL MEDICINE

## 2018-03-08 RX ORDER — HYDROXYZINE PAMOATE 25 MG/1
CAPSULE ORAL
COMMUNITY
Start: 2018-01-25 | End: 2018-03-08

## 2018-03-08 RX ORDER — TIZANIDINE 4 MG/1
TABLET ORAL
COMMUNITY
Start: 2018-03-05 | End: 2019-02-28

## 2018-03-08 NOTE — TELEPHONE ENCOUNTER
Pt scheduled for procedure on 3/20/2018 with Dr. Mayo.     Per Dr. Mayo verbal order was called in to Osei Giraldo at [phone]. Gave v/o for  nurse to go out on Monday, 3/12/2018 to change pt's sptube with 20 FR silver coated silicone catheter, as requested previously by pt. Urine specimen to be collected from new catheter and sent for C&S. Results to be faxed to clinic.

## 2018-03-08 NOTE — PROGRESS NOTES
CHIEF COMPLAINT:    Mrs. Hawley is a 62 y.o. female presenting to sign consents for cystoscopy, Botox injection of the bladder and injection of Macroplastique    PRESENTING ILLNESS:    Tasha Hawley is a 62 y.o. female who has a history of lower back pain, chronic lower extremity edema, longstanding cardiac disease that is compensated.  She has a history of incomplete bladder emptying ramila tis managed with a suprapubic tube.  She was also found ot have stress incontinence, and finds that she has leaking from the urethra with bladder spasms.  She has been treated with Botox injection of the bladder in the past but the stress component has not been addressed.  She finds she has increased amount of incontinence per the urethra.      REVIEW OF SYSTEMS:    Review of Systems   Constitutional: Negative.    HENT: Negative.    Eyes: Negative.    Respiratory: Negative.    Cardiovascular: Positive for leg swelling.   Gastrointestinal: Negative.    Genitourinary:        Incontinence   Musculoskeletal: Positive for back pain.   Skin: Negative.    Neurological: Negative.    Endo/Heme/Allergies: Negative.    Psychiatric/Behavioral: Negative.          PATIENT HISTORY:    Past Medical History:   Diagnosis Date    Anxiety     Arthritis     Bilateral lower extremity edema     severe chronic    Carotid artery occlusion     Cataract     Depression     Fever blister     Hypothyroid     Iron deficiency anemia     Lumbar radiculopathy     with chronic pain    Ocular migraine     Sleep apnea     cpap       Past Surgical History:   Procedure Laterality Date    BACK SURGERY      x 12    CATARACT EXTRACTION W/  INTRAOCULAR LENS IMPLANT Left     Dr Coleman     HYSTERECTOMY  1975    endometriosis    pain pump placement      SQ Dilaudid Pump managed by Dr. Hillman, Pain Management    SPINAL CORD STIMULATOR REMOVAL      before [facility]    SPINE SURGERY  5-13-13    CERVICAL FUSION       Family History   Problem Relation Age  of Onset    Cancer Mother 55     breast    Cancer Father      esophagus,had laryngectomy    Esophageal cancer Father     Parkinsonism Maternal Grandmother     Tremor Maternal Grandmother     Heart disease Maternal Uncle      Social History    Marital status:      Social History Main Topics    Smoking status: Never Smoker    Smokeless tobacco: Never Used    Alcohol use No      Comment: denies    Drug use: No    Sexual activity: No       Allergies:  (d)-limonene flavor; Bactrim [sulfamethoxazole-trimethoprim]; Imitrex [sumatriptan succinate]; Penicillins; Topamax [topiramate]; Vancomycin; Butorphanol tartrate; Darvocet a500 [propoxyphene n-acetaminophen]; Fentanyl; White petrolatum-zinc; Zinc oxide-white petrolatum; and Latex, natural rubber    Medications:  Outpatient Encounter Prescriptions as of 3/8/2018   Medication Sig Dispense Refill    aspirin (ECOTRIN) 81 MG EC tablet Take 1 tablet (81 mg total) by mouth once daily.  0    atorvastatin (LIPITOR) 80 MG tablet TAKE ONE TABLET BY MOUTH EVERY DAY. (Patient taking differently: TAKE ONE TABLET BY MOUTH Nightly) 90 tablet 3    DOC-Q-LACE 100 mg capsule TAKE ONE CAPSULE BY MOUTH THREE TIMES A DAY AS NEEDED FOR CONSTIPATION 90 capsule 3    docosanol (ABREVA) 10 % Crea Apply 1 application topically 2 (two) times daily. 2 g 0    FLUoxetine (PROZAC) 20 MG capsule TAKE THREE CAPSULES BY MOUTH DAILY 90 capsule 1    furosemide (LASIX) 40 MG tablet Take 1 tablet (40 mg total) by mouth 2 (two) times daily. 60 tablet 11    hydrocodone-acetaminophen 10-325mg (NORCO)  mg Tab Take 2 tablets by mouth every 4 (four) hours as needed for Pain.       hydrOXYzine HCl (ATARAX) 25 MG tablet Take 1 tablet (25 mg total) by mouth 3 (three) times daily as needed for Itching. 30 tablet 1    levothyroxine (SYNTHROID) 112 MCG tablet Take 1 tablet (112 mcg total) by mouth before breakfast. 90 tablet 3    methenamine (HIPREX) 1 gram Tab Take 1 tablet (1 g total)  by mouth 2 (two) times daily. 60 tablet 11    ondansetron (ZOFRAN) 8 MG tablet TAKE ONE TABLET BY MOUTH EVERY TWELVE HOURS AS NEEDED FOR NAUSEA 60 tablet 2    oxybutynin (DITROPAN XL) 15 MG TR24 Take 1 tablet (15 mg total) by mouth once daily. 30 tablet 6    pantoprazole (PROTONIX) 40 MG tablet Take 1 tablet (40 mg total) by mouth once daily. 90 tablet 3    potassium citrate (UROCIT-K) 5 mEq (540 mg) TbSR Take 1 tablet (5 mEq total) by mouth 3 (three) times daily with meals. 90 tablet 11    ranitidine (ZANTAC) 150 MG capsule Take 1 capsule (150 mg total) by mouth 2 (two) times daily. 60 capsule 2    SENNOSIDES (SENNA LAX ORAL) Take 20 mg by mouth every evening.       tiZANidine (ZANAFLEX) 4 MG tablet       traZODone (DESYREL) 100 MG tablet Take 2 tablets (200 mg total) by mouth every evening. 120 tablet 4    [DISCONTINUED] ascorbic acid, vitamin C, (VITAMIN C) 500 mg Chew Take 1 tablet by mouth 3 (three) times daily. 90 each 11    [DISCONTINUED] CALCIUM 600 WITH VITAMIN D3 600 mg(1,500mg) -200 unit Tab       [DISCONTINUED] hydrOXYzine pamoate (VISTARIL) 25 MG Cap       [DISCONTINUED] lamotrigine (LAMICTAL) 25 MG tablet Take 1 tablet (25 mg total) by mouth once daily. 30 tablet 6    [DISCONTINUED] LORazepam (ATIVAN) 1 MG tablet Take 1 tablet (1 mg total) by mouth every 8 (eight) hours as needed (muscle spasm). 20 tablet 0    [DISCONTINUED] potassium chloride (KLOR-CON) 10 MEQ TbSR       [DISCONTINUED] potassium chloride SA (K-DUR,KLOR-CON) 20 MEQ tablet Take 2 tablets (40 mEq total) by mouth 2 (two) times daily. 120 tablet 11    [DISCONTINUED] ranitidine (ZANTAC) 150 MG tablet       [DISCONTINUED] sennosides 17.2 mg Tab       [DISCONTINUED] valACYclovir (VALTREX) 1000 MG tablet Take 1 tablet (1,000 mg total) by mouth 3 (three) times daily. 21 tablet 0     No facility-administered encounter medications on file as of 3/8/2018.          PHYSICAL EXAMINATION:    The patient generally appears in good  health, is appropriately interactive, and is in no apparent distress.    Skin: No lesions.    Mental: Cooperative with normal affect.    Neuro: Grossly intact.    HEENT: Normal. No evidence of lymphadenopathy.    Chest:  normal inspiratory effort.    Abdomen:  Soft, non-tender. No masses or organomegaly. Bladder is not palpable. No evidence of flank discomfort. No evidence of inguinal hernia.  Urine from the suprapubic tube is clear straw colored.     Extremities: No clubbing, cyanosis, or edema      LABS:    Lab Results   Component Value Date    BUN 8 02/09/2018    CREATININE 0.8 02/09/2018       IMPRESSION:    Encounter Diagnoses   Name Primary?    Male urinary stress incontinence Yes    Neurogenic bladder        PLAN:    1.  For cystoscopy, Botox injection of the bladder (last was Jan 2017) and Macroplastique injection of the urethra.    2.  Home health to change the catheter next week and send the urine for culture.

## 2018-03-08 NOTE — PROGRESS NOTES
"Subjective:       Patient ID: Tasha Hawley is a 62 y.o. female.    Chief Complaint: Chronic Pain    HPI - Ms Hawley brought in her extensive list of medications for reconciliation today.  She had a pain injection yesterday, and says her back pain is worse today.  She's without any chest pain.  Constipation is under control.  She's having endoscopy soon to look into her dysphagia.  She's seeing urology later today to assess the suprapubic catheter and her incontinence.  LE edema continues to bother her, but it's not as bad as it has been.  She's not having dyspnea.  She isn't a smoker.  Her sleep is still "bad", though she did not volunteer or lead with this complaint today like she usually does.  Taking 200 of trazodone nightly.  Wants some more alprazolam, but I declined to do that.    PMH:  Chronic insomnia.  Chronic low back pain with narcotic use.  Indwelling narcotic pump  History CVA with dysarthria  Neurogenic bladder, with recurrent UTI's.  SP catheter placed Nov 2016  Hypothyroidism, stable  CECI, not on CPAP  CAD, with ACS in Jan 2016 - subtot mid LAD lesion without PCI; ischemic CM with EF 40% and chronic LE edema. Followed by [facility] cardiology  Anxiety and Depression  Chronic constipation, likely d/t ongoing narcotic use  Intermittent nausea  Chronic LE edema     Meds: Reviewed and reconciled in EPIC with patient during visit today.    Review of Systems   Constitutional: Negative for fever.   HENT: Negative for congestion.    Respiratory: Negative for shortness of breath.    Cardiovascular: Negative for chest pain.   Gastrointestinal: Negative for abdominal pain.   Genitourinary: Positive for difficulty urinating.   Musculoskeletal: Positive for arthralgias, back pain and gait problem.   Skin: Negative for rash.   Neurological: Negative for headaches.   Psychiatric/Behavioral: Positive for sleep disturbance. The patient is nervous/anxious.        Objective:      Physical Exam   Constitutional: " She is oriented to person, place, and time. She appears well-developed and well-nourished. No distress.   Wheelchair-bound patient speaking with a stutter and slurred voice d/t prior stroke   HENT:   Head: Normocephalic and atraumatic.   Cardiovascular: Normal rate, regular rhythm and normal heart sounds.  Exam reveals no gallop and no friction rub.    No murmur heard.  Pulmonary/Chest: Effort normal and breath sounds normal. No respiratory distress. She has no wheezes. She has no rales. She exhibits no tenderness.   Musculoskeletal:   Marked kyphoscoliosis   Neurological: She is alert and oriented to person, place, and time.   Skin: Skin is warm and dry. She is not diaphoretic. No erythema.   Psychiatric: She has a normal mood and affect.   Nursing note and vitals reviewed.      Assessment:       1. Chronic pain syndrome    2. Coronary artery disease involving native coronary artery of native heart without angina pectoris    3. Drug-induced constipation    4. Dysphagia, unspecified type    5. Generalized anxiety disorder    6. Narcotic dependency, continuous    7. Urinary retention    8. Suprapubic catheter    9. Chronic combined systolic and diastolic congestive heart failure    10. Lymphedema of both lower extremities    11. Scoliosis of lumbar spine, unspecified scoliosis type        Plan:       Tasha was seen today for chronic pain.    Diagnoses and all orders for this visit:    Chronic pain syndrome - worsening.  Doesn't need refills.  Advised patience with pain injections and to continue her relationship with the pain clinic    Coronary artery disease involving native coronary artery of native heart without angina pectoris - stable, on treatment    Drug-induced constipation    Dysphagia, unspecified type    Generalized anxiety disorder - stable    Narcotic dependency, continuous - stable, narcotics supplied by outside pain clinic    Urinary retention    Suprapubic catheter    Chronic combined systolic and  diastolic congestive heart failure - stable, asymptomatic today    Lymphedema of both lower extremities    Scoliosis of lumbar spine, unspecified scoliosis type    rtc prn, or in 3 months    G Sriram Hodges MD MPH  Staff Internist

## 2018-03-09 ENCOUNTER — TELEPHONE (OUTPATIENT)
Dept: INTERNAL MEDICINE | Facility: CLINIC | Age: 63
End: 2018-03-09

## 2018-03-09 ENCOUNTER — ANESTHESIA EVENT (OUTPATIENT)
Dept: SURGERY | Facility: HOSPITAL | Age: 63
End: 2018-03-09
Payer: MEDICARE

## 2018-03-09 NOTE — ANESTHESIA PREPROCEDURE EVALUATION
Anesthesia Assessment: Preoperative EQUATION   Patient with history of severe lymphedema and chronic pain syndrome here for cystoscopy.       Planned Procedure: Procedure(s) (LRB):  CYSTOSCOPY (N/A)  INJECTION-BULKING AGENT URETERAL OUTLET (N/A)  INJECTION-BOTOX (N/A)  Requested Anesthesia Type:Monitor Anesthesia Care  Surgeon: Shireen Mayo MD  Service: Urology  Known or anticipated Date of Surgery:3/20/2018     Surgeon notes: reviewed     Electronic QUestionnaire Assessment completed via nurse interview with patient.         No Aq           Triage considerations:   Pt. Has A Implanted pain pump-- Dilaudid        Previous anesthesia records:[facility] General  7/21/17 EGD     Last PCP note: within 1 month , within [facility] Dr. Hodges  Subspecialty notes: ENT, Gastroenterology, Hematology/Oncology, Neurology, Pain Management, Psychiatry     Other important co-morbidities:   GONZALO  Iron deficiency anemia  Chronic pain syndrome  Drug -induced constipation  Dysphagia  Narcotic dependency  Urinary retention  Suprapubic catheter  ? 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                            Instructions given. (See in Nurse's note)     Optimization:  Current lab/ has had anesthesia multiple times, seen by PCP 3/8/18                Plan:   Straight line to surgery     Navigation:  Straight Line to surgery.                          No tests, anesthesia preop clinic visit, or consult required.                                                   Plans per surgeon and Follow-up per Surgeon                                                                                                         03/09/2018  Tasha Hawley is a 62 y.o., female.    Anesthesia  Evaluation         Review of Systems  Anesthesia Hx:  No problems with previous Anesthesia History of prior surgery of interest to airway management or planning: Previous anesthesia: MAC  EGD 7/21/17 with MAC.    Social:  No Alcohol Use, Non-Smoker    Hematology/Oncology:     Oncology Normal    -- Anemia: Hematology Comments: Iron deficiency anemia    EENT/Dental:   Slurred speech  dysphagia   Cardiovascular:   Exercise tolerance: poor CAD   CHF hyperlipidemia Hx chronic combined systolic and diastolic congestive heart failure   Pulmonary:   Sleep Apnea, CPAP    Renal/:   Recurrent uti  Urge incontinence  Has S/P tube   Hepatic/GI:   GERD Drug- induced constipation   Musculoskeletal:   Arthritis  Osteoarthritis  Has implanted pain pump --dilaudid  Scoliosis  Cervical myelopathy  Gets around with walker/ wheelchair   Neurological:   Neuromuscular Disease, Headaches   Chronic Pain Syndrome  Peripheral Neuropathy    Endocrine:   Hypothyroidism    Dermatological:  Skin Normal    Psych:   anxiety depression Major depressive disorder         Physical Exam  General:  Well nourished    Airway/Jaw/Neck:  Airway Findings: Mouth Opening: Normal Tongue: Normal  General Airway Assessment: Adult  Mallampati: II  Improves to II with phonation.  TM Distance: Normal, at least 6 cm      Dental:  Dental Findings: In tact    Chest/Lungs:  Chest/Lungs Findings: Clear to auscultation     Heart/Vascular:  Heart Findings: Rate: Normal  Rhythm: Regular Rhythm  Sounds: Normal      Musculoskeletal:  Musculoskeletal Findings: Severe LE edema. Painful     Mental Status:  Mental Status Findings:  Cooperative, Alert and Oriented         Anesthesia Plan  Type of Anesthesia, risks & benefits discussed:  Anesthesia Type:  general, MAC  Patient's Preference:   Intra-op Monitoring Plan:   Intra-op Monitoring Plan Comments:   Post Op Pain Control Plan:   Post Op Pain Control Plan Comments:   Induction:   IV  Beta Blocker:  Patient is not currently on  a Beta-Blocker (No further documentation required).       Informed Consent: Patient understands risks and agrees with Anesthesia plan.  Questions answered. Anesthesia consent signed with patient.  ASA Score: 3     Day of Surgery Review of History & Physical:            Ready For Surgery From Anesthesia Perspective.

## 2018-03-09 NOTE — PRE ADMISSION SCREENING
Anesthesia Assessment: Preoperative EQUATION    Planned Procedure: Procedure(s) (LRB):  CYSTOSCOPY (N/A)  INJECTION-BULKING AGENT URETERAL OUTLET (N/A)  INJECTION-BOTOX (N/A)  Requested Anesthesia Type:Monitor Anesthesia Care  Surgeon: Shireen Mayo MD  Service: Urology  Known or anticipated Date of Surgery:3/20/2018    Surgeon notes: reviewed    Electronic QUestionnaire Assessment completed via nurse interview with patient.        No Aq        Triage considerations:   Pt. Has A Implanted pain pump-- Dilaudid      Previous anesthesia records:[facility]  7/21/17 EGD    Last PCP note: within 1 month , within [facility] Dr[facility] [place]  Subspecialty notes: ENT, Gastroenterology, Hematology/Oncology, Neurology, Pain Management, Psychiatry    Other important co-morbidities:   GONZALO  Iron deficiency anemia  Chronic pain syndrome  Drug -induced constipation  Dysphagia  Narcotic dependency  Urinary retention  Suprapubic catheter  ? 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            Instructions given. (See in Nurse's note)    Optimization:  Current lab/ has had anesthesia multiple times, seen by PCP 3/8/18      Plan:   Straight line to surgery    Navigation:  Straight Line to surgery.               No tests, anesthesia preop clinic visit, or consult required.                  Plans per surgeon and Follow-up per Surgeon

## 2018-03-09 NOTE — TELEPHONE ENCOUNTER
Patient called and wanted to know if you can switch her lasix to Bumetanide because it worked better for someone she knows and if it would work better for her.   Please advise

## 2018-03-09 NOTE — TELEPHONE ENCOUNTER
No. There are too many electrolyte disturbances from bumetanide.  She should stay on lasix.  Keep her feet elevated when sitting/lying.  Walk more.  Her leg swelling isn't going to respond to medication changes.    Thanks.  D

## 2018-03-12 NOTE — PRE-PROCEDURE INSTRUCTIONS
MD Isabel Lundberg II, REJI; P [facility] Staff             Noted.  She's as good as she can be for surgery.    Previous Messages      ----- Message -----   From: Isabel Vang RN   Sent: 3/9/2018  10:07 AM   To: Mesfin Hodges II, MD, [facility] Staff     Fyi , pt is scheduled for cystoscopy, injection- bulking agent , ureteral outlet, injection-botox by Dr. Mayo 3/20         Evie RN BC   Pre-op anesthesia         SANTHOSH Vang RN BC  3/12/18

## 2018-03-14 ENCOUNTER — TELEPHONE (OUTPATIENT)
Dept: PSYCHIATRY | Facility: CLINIC | Age: 63
End: 2018-03-14

## 2018-03-14 NOTE — TELEPHONE ENCOUNTER
Ms. Hawley called to cancel her appointment for today at 1:00 pm due to illness.  She will be rescheduled for 3/26/2018 at 11:00 am.

## 2018-03-15 ENCOUNTER — TELEPHONE (OUTPATIENT)
Dept: UROLOGY | Facility: CLINIC | Age: 63
End: 2018-03-15

## 2018-03-15 DIAGNOSIS — N30.90 BLADDER INFECTION: Primary | ICD-10-CM

## 2018-03-15 RX ORDER — CEPHALEXIN 500 MG/1
500 CAPSULE ORAL EVERY 8 HOURS
Qty: 30 CAPSULE | Refills: 0 | Status: SHIPPED | OUTPATIENT
Start: 2018-03-15 | End: 2018-03-26

## 2018-03-15 NOTE — TELEPHONE ENCOUNTER
"----- Message from Karoline Land MA sent at 3/15/2018 11:34 AM CDT -----  Contact: Home: [phone]   States that she has a states S/P tube and urine is "pouring out of her" she has to change clothes every 10 minutes.  Home: [phone]     "

## 2018-03-15 NOTE — TELEPHONE ENCOUNTER
10 days of Keflex (she has taken before and tolerated it) was sent has a procedure on the 20th so will get her through the date of surgery and a few days beyond.

## 2018-03-19 ENCOUNTER — TELEPHONE (OUTPATIENT)
Dept: UROLOGY | Facility: CLINIC | Age: 63
End: 2018-03-19

## 2018-03-19 NOTE — TELEPHONE ENCOUNTER
----- Message from Karoline Land MA sent at 3/16/2018  4:22 PM CDT -----  Contact:  Mobile: [phone]   Pt would only states that she needs to talk to her doctor     Mobile: [phone]

## 2018-03-20 ENCOUNTER — OUTPATIENT CASE MANAGEMENT (OUTPATIENT)
Dept: ADMINISTRATIVE | Facility: OTHER | Age: 63
End: 2018-03-20

## 2018-03-20 ENCOUNTER — SURGERY (OUTPATIENT)
Age: 63
End: 2018-03-20

## 2018-03-20 ENCOUNTER — HOSPITAL ENCOUNTER (OUTPATIENT)
Facility: HOSPITAL | Age: 63
Discharge: HOME | End: 2018-03-20
Attending: UROLOGY | Admitting: UROLOGY
Payer: MEDICARE

## 2018-03-20 ENCOUNTER — ANESTHESIA (OUTPATIENT)
Dept: SURGERY | Facility: HOSPITAL | Age: 63
End: 2018-03-20
Payer: MEDICARE

## 2018-03-20 VITALS
RESPIRATION RATE: 18 BRPM | OXYGEN SATURATION: 99 % | WEIGHT: 170 LBS | TEMPERATURE: 98 F | DIASTOLIC BLOOD PRESSURE: 59 MMHG | HEIGHT: 64 IN | SYSTOLIC BLOOD PRESSURE: 120 MMHG | BODY MASS INDEX: 29.02 KG/M2 | HEART RATE: 67 BPM

## 2018-03-20 DIAGNOSIS — I25.10 CORONARY ARTERY DISEASE INVOLVING NATIVE CORONARY ARTERY OF NATIVE HEART WITHOUT ANGINA PECTORIS: Chronic | ICD-10-CM

## 2018-03-20 DIAGNOSIS — N31.9 NEUROGENIC BLADDER: Primary | ICD-10-CM

## 2018-03-20 PROCEDURE — 51715 ENDOSCOPIC INJECTION/IMPLANT: CPT | Mod: ,,, | Performed by: UROLOGY

## 2018-03-20 PROCEDURE — L8606 SYNTHETIC IMPLNT URINARY 1ML: HCPCS | Performed by: UROLOGY

## 2018-03-20 PROCEDURE — 63600175 PHARM REV CODE 636 W HCPCS: Performed by: REGISTERED NURSE

## 2018-03-20 PROCEDURE — 88305 TISSUE EXAM BY PATHOLOGIST: CPT | Performed by: PATHOLOGY

## 2018-03-20 PROCEDURE — 63600175 PHARM REV CODE 636 W HCPCS: Performed by: STUDENT IN AN ORGANIZED HEALTH CARE EDUCATION/TRAINING PROGRAM

## 2018-03-20 PROCEDURE — 52287 CYSTOSCOPY CHEMODENERVATION: CPT | Mod: 59,,, | Performed by: UROLOGY

## 2018-03-20 PROCEDURE — 71000033 HC RECOVERY, INTIAL HOUR: Performed by: UROLOGY

## 2018-03-20 PROCEDURE — 51705 CHANGE OF BLADDER TUBE: CPT | Mod: 51,,, | Performed by: UROLOGY

## 2018-03-20 PROCEDURE — D9220A PRA ANESTHESIA: Mod: ANES,,, | Performed by: ANESTHESIOLOGY

## 2018-03-20 PROCEDURE — 37000009 HC ANESTHESIA EA ADD 15 MINS: Performed by: UROLOGY

## 2018-03-20 PROCEDURE — 63600175 PHARM REV CODE 636 W HCPCS: Mod: JG | Performed by: UROLOGY

## 2018-03-20 PROCEDURE — 36000707: Performed by: UROLOGY

## 2018-03-20 PROCEDURE — 25000003 PHARM REV CODE 250: Performed by: STUDENT IN AN ORGANIZED HEALTH CARE EDUCATION/TRAINING PROGRAM

## 2018-03-20 PROCEDURE — D9220A PRA ANESTHESIA: Mod: CRNA,,, | Performed by: REGISTERED NURSE

## 2018-03-20 PROCEDURE — 36000706: Performed by: UROLOGY

## 2018-03-20 PROCEDURE — 71000015 HC POSTOP RECOV 1ST HR: Performed by: UROLOGY

## 2018-03-20 PROCEDURE — 88305 TISSUE EXAM BY PATHOLOGIST: CPT | Mod: 26,,, | Performed by: PATHOLOGY

## 2018-03-20 PROCEDURE — 25000003 PHARM REV CODE 250: Performed by: REGISTERED NURSE

## 2018-03-20 PROCEDURE — 52204 CYSTOSCOPY W/BIOPSY(S): CPT | Mod: 59,,, | Performed by: UROLOGY

## 2018-03-20 PROCEDURE — 37000008 HC ANESTHESIA 1ST 15 MINUTES: Performed by: UROLOGY

## 2018-03-20 DEVICE — IMPLANT MACROPLASTIQUE: Type: IMPLANTABLE DEVICE | Site: URETHRA | Status: FUNCTIONAL

## 2018-03-20 RX ORDER — SODIUM CHLORIDE 9 MG/ML
INJECTION, SOLUTION INTRAVENOUS CONTINUOUS PRN
Status: DISCONTINUED | OUTPATIENT
Start: 2018-03-20 | End: 2018-03-20

## 2018-03-20 RX ORDER — ONDANSETRON 2 MG/ML
INJECTION INTRAMUSCULAR; INTRAVENOUS
Status: DISCONTINUED | OUTPATIENT
Start: 2018-03-20 | End: 2018-03-20

## 2018-03-20 RX ORDER — HYDROMORPHONE HYDROCHLORIDE 1 MG/ML
INJECTION, SOLUTION INTRAMUSCULAR; INTRAVENOUS; SUBCUTANEOUS
Status: DISCONTINUED
Start: 2018-03-20 | End: 2018-03-20 | Stop reason: HOSPADM

## 2018-03-20 RX ORDER — IBUPROFEN 600 MG/1
600 TABLET ORAL EVERY 6 HOURS PRN
Status: DISCONTINUED | OUTPATIENT
Start: 2018-03-20 | End: 2018-03-20 | Stop reason: HOSPADM

## 2018-03-20 RX ORDER — PROPOFOL 10 MG/ML
VIAL (ML) INTRAVENOUS
Status: DISCONTINUED | OUTPATIENT
Start: 2018-03-20 | End: 2018-03-20

## 2018-03-20 RX ORDER — LIDOCAINE HCL/PF 100 MG/5ML
SYRINGE (ML) INTRAVENOUS
Status: DISCONTINUED | OUTPATIENT
Start: 2018-03-20 | End: 2018-03-20

## 2018-03-20 RX ORDER — MIDAZOLAM HYDROCHLORIDE 1 MG/ML
INJECTION, SOLUTION INTRAMUSCULAR; INTRAVENOUS
Status: DISCONTINUED | OUTPATIENT
Start: 2018-03-20 | End: 2018-03-20

## 2018-03-20 RX ORDER — GLYCOPYRROLATE 0.2 MG/ML
INJECTION INTRAMUSCULAR; INTRAVENOUS
Status: DISCONTINUED | OUTPATIENT
Start: 2018-03-20 | End: 2018-03-20

## 2018-03-20 RX ORDER — SODIUM CHLORIDE 0.9 % (FLUSH) 0.9 %
3 SYRINGE (ML) INJECTION
Status: DISCONTINUED | OUTPATIENT
Start: 2018-03-20 | End: 2018-03-20 | Stop reason: HOSPADM

## 2018-03-20 RX ORDER — HYDROMORPHONE HYDROCHLORIDE 2 MG/ML
INJECTION, SOLUTION INTRAMUSCULAR; INTRAVENOUS; SUBCUTANEOUS
Status: DISCONTINUED | OUTPATIENT
Start: 2018-03-20 | End: 2018-03-20

## 2018-03-20 RX ORDER — PROPOFOL 10 MG/ML
VIAL (ML) INTRAVENOUS CONTINUOUS PRN
Status: DISCONTINUED | OUTPATIENT
Start: 2018-03-20 | End: 2018-03-20

## 2018-03-20 RX ORDER — DEXAMETHASONE SODIUM PHOSPHATE 4 MG/ML
INJECTION, SOLUTION INTRA-ARTICULAR; INTRALESIONAL; INTRAMUSCULAR; INTRAVENOUS; SOFT TISSUE
Status: DISCONTINUED | OUTPATIENT
Start: 2018-03-20 | End: 2018-03-20

## 2018-03-20 RX ADMIN — PROPOFOL 20 MG: 10 INJECTION, EMULSION INTRAVENOUS at 10:03

## 2018-03-20 RX ADMIN — DEXAMETHASONE SODIUM PHOSPHATE 4 MG: 4 INJECTION, SOLUTION INTRAMUSCULAR; INTRAVENOUS at 10:03

## 2018-03-20 RX ADMIN — HYDROMORPHONE HYDROCHLORIDE 1 MG: 2 INJECTION, SOLUTION INTRAMUSCULAR; INTRAVENOUS; SUBCUTANEOUS at 10:03

## 2018-03-20 RX ADMIN — ONDANSETRON 4 MG: 2 INJECTION INTRAMUSCULAR; INTRAVENOUS at 10:03

## 2018-03-20 RX ADMIN — SODIUM CHLORIDE: 0.9 INJECTION, SOLUTION INTRAVENOUS at 09:03

## 2018-03-20 RX ADMIN — GENTAMICIN SULFATE 240 MG: 40 INJECTION, SOLUTION INTRAMUSCULAR; INTRAVENOUS at 10:03

## 2018-03-20 RX ADMIN — ONABOTULINUMTOXINA 100 UNITS: 100 INJECTION, POWDER, LYOPHILIZED, FOR SOLUTION INTRADERMAL; INTRAMUSCULAR at 10:03

## 2018-03-20 RX ADMIN — MIDAZOLAM HYDROCHLORIDE 2 MG: 1 INJECTION, SOLUTION INTRAMUSCULAR; INTRAVENOUS at 09:03

## 2018-03-20 RX ADMIN — GLYCOPYRROLATE 0.1 MG: 0.2 INJECTION, SOLUTION INTRAMUSCULAR; INTRAVENOUS at 10:03

## 2018-03-20 RX ADMIN — PROPOFOL 125 MCG/KG/MIN: 10 INJECTION, EMULSION INTRAVENOUS at 10:03

## 2018-03-20 RX ADMIN — LIDOCAINE HYDROCHLORIDE 50 MG: 20 INJECTION, SOLUTION INTRAVENOUS at 10:03

## 2018-03-20 NOTE — INTERVAL H&P NOTE
The patient has been examined and the H&P has been reviewed:    I concur with the findings and no changes have occurred since H&P was written.     To OR today for cystoscopy with botox injection and macroplastique urethral bulking agent injection    Urine dipstick negative for all components       Anesthesia/Surgery risks, benefits and alternative options discussed and understood by patient/family.          Active Hospital Problems    Diagnosis  POA    Neurogenic bladder [N31.9]  Yes     Chronic      Resolved Hospital Problems    Diagnosis Date Resolved POA   No resolved problems to display.       Patient seen in holding.  No changes in clinical condition.  Proceed with planned procedure.

## 2018-03-20 NOTE — DISCHARGE INSTRUCTIONS
Cystoscopy    Cystoscopy is a procedure that lets your doctor look directly inside your urethra and bladder. It can be used to:  · Help diagnose a problem with your urethra, bladder, or kidneys.  · Take a sample (biopsy) of bladder or urethral tissue.  · Treat certain problems (such as removing kidney stones).  · Place a stent to bypass an obstruction.  · Take special X-rays of the kidneys.  Based on the findings, your doctor may recommend other tests or treatments.  What is a cystoscope?  A cystoscope is a telescope-like instrument that contains lenses and fiberoptics (small glass wires that make bright light). The cystoscope may be straight and rigid, or flexible to bend around curves in the urethra. The doctor may look directly into the cystoscope, or project the image onto a monitor.  Getting ready  · Ask your doctor if you should stop taking any medicines before the procedure.  · Ask whether you should avoid eating or drinking anything after midnight before the procedure.  · Follow any other instructions your doctor gives you.  Tell your doctor before the exam if you:  · Take any medicines, such as aspirin or blood thinners  · Have allergies to any medicines  · Are pregnant   The procedure  Cystoscopy is done in the doctors office, surgery center, or hospital. The doctor and a nurse are present during the procedure. It takes only a few minutes, longer if a biopsy, X-ray, or treatment needs to be done.  During the procedure:  · You lie on an exam table on your back, knees bent and legs apart. You are covered with a drape.  · Your urethra and the area around it are washed. Anesthetic jelly may be applied to numb the urethra. Other pain medicine is usually not needed. In some cases, you may be offered a mild sedative to help you relax. If a more extensive procedure is to be done, such as a biopsy or kidney stone removal, general anesthesia may be needed.  · The cystoscope is inserted. A sterile fluid is put  into the bladder to expand it. You may feel pressure from this fluid.  · When the procedure is done, the cystoscope is removed.  After the procedure  If you had a sedative, general anesthesia, or spinal anesthesia, you must have someone drive you home. Once youre home:  · Drink plenty of fluids.  · You may have burning or light bleeding when you urinate--this is normal.  · Medicines may be prescribed to ease any discomfort or prevent infection. Take these as directed.  · Call your doctor if you have heavy bleeding or blood clots, burning that lasts more than a day, a fever over 100°F  (38° C), or trouble urinating.  Date Last Reviewed: 1/1/2017 © 2000-2017 [email or web address]. [address] [place] [address], [place] [address]. All rights reserved. This information is not intended as a substitute for professional medical care. Always follow your healthcare professional's instructions.            When to call your healthcare provider  Call your health care provider right away if you have any of the following:  · Catheter that falls out, or is clogged or feels clogged  · Stitches that fall out  · Urine leaking around catheter  · Urine that is cloudy, bloody, or smells bad  · No urine drainage  · Bladder that feels full or painful  · Rash, itching, redness, swelling, or drainage at the catheter site  · Fever above 100.4°F (38.°C) or shaking chills

## 2018-03-20 NOTE — TRANSFER OF CARE
"Anesthesia Transfer of Care Note    Patient: Tasha Hawley    Procedure(s) Performed: Procedure(s) (LRB):  CYSTOSCOPY (N/A)  INJECTION-BULKING AGENT URETHRAL  OUTLET (N/A)  INJECTION-BOTOX (N/A)  BIOPSY-BLADDER (N/A)    Patient location: PACU    Anesthesia Type: general    Transport from OR: Transported from OR on room air with adequate spontaneous ventilation    Post pain: adequate analgesia    Post assessment: tolerated procedure well and no apparent anesthetic complications    Post vital signs: stable    Level of consciousness: awake, alert and oriented    Nausea/Vomiting: no nausea/vomiting    Complications: none    Transfer of care protocol was followed      Last vitals:   Visit Vitals  BP (!) 110/53 (BP Location: Right arm, Patient Position: Lying)   Pulse (!) 50   Temp 36.7 °C (98.1 °F) (Temporal)   Resp 18   Ht 5' 4" (1.626 m)   Wt 77.1 kg (170 lb)   LMP  (LMP Unknown)   SpO2 99%   Breastfeeding? No   BMI 29.18 kg/m²     "

## 2018-03-20 NOTE — PLAN OF CARE
Patient tolerated procedure/anesthesia well, vss, no distress noted or reported. Denies nausea, tolerates PO intake. Pain well controlled. Suprapubic catheter intact, no complications, patient manages well. Discharge instructions reviewed with patient and spouse, verbalized understanding, consents with chart.

## 2018-03-20 NOTE — DISCHARGE SUMMARY
[facility]  Discharge Note  Short Stay    Admit Date: 3/20/2018    Discharge Date and Time: 3/20/2018       Attending Physician: Shireen Mayo MD     Discharge Provider: Seferino Colin MD    Diagnoses:  Active Hospital Problems    Diagnosis  POA    *Neurogenic bladder [N31.9]  Yes     Chronic    Chronic combined systolic and diastolic congestive heart failure [I50.42]  Yes    Suprapubic catheter [Z93.59]  Not Applicable    Dysphagia [R13.10]  Yes    Scoliosis deformity of spine [M41.9]  Yes     Chronic    S/P insertion of spinal cord stimulator [Z98.890]  Not Applicable     Chronic    S/P insertion of intrathecal pump [Z98.890]  Not Applicable     Chronic    Paresthesia of both lower extremities [R20.2]  Yes    Facet arthropathy, lumbar [M46.96]  Yes    Lymphedema of both lower extremities [I89.0]  Yes     Chronic    Primary hypothyroidism [E03.9]  Yes     Chronic    Urinary retention [R33.9]  Yes     Chronic    GERD (gastroesophageal reflux disease) [K21.9]  Yes     Chronic    Coronary artery disease involving native coronary artery of native heart without angina pectoris [I25.10]  Yes     Chronic    Narcotic dependency, continuous [F11.20]  Yes     Chronic    Cervical myelopathy [G95.9]  Yes    Chronic pain syndrome [G89.4]  Yes    Major depressive disorder, recurrent, mild [F33.0]  Yes    Generalized anxiety disorder [F41.1]  Yes      Resolved Hospital Problems    Diagnosis Date Resolved POA   No resolved problems to display.       Discharged Condition: good    Hospital Course: Patient was admitted for a cystoscopy with botox injection, macroplastique, and cystoscopy with bladder biopsy and tolerated the procedure well with no complications.    Final Diagnoses: Same as principal problem.    Disposition: Home or Self Care    Follow up/Patient Instructions:    Medications:  Reconciled Home Medications:   Current Discharge Medication List      CONTINUE these medications which  have NOT CHANGED    Details   atorvastatin (LIPITOR) 80 MG tablet TAKE ONE TABLET BY MOUTH EVERY DAY.  Qty: 90 tablet, Refills: 3      cephALEXin (KEFLEX) 500 MG capsule Take 1 capsule (500 mg total) by mouth every 8 (eight) hours.  Qty: 30 capsule, Refills: 0    Associated Diagnoses: Bladder infection      DOC-Q-LACE 100 mg capsule TAKE ONE CAPSULE BY MOUTH THREE TIMES A DAY AS NEEDED FOR CONSTIPATION  Qty: 90 capsule, Refills: 3      docosanol (ABREVA) 10 % Crea Apply 1 application topically 2 (two) times daily.  Qty: 2 g, Refills: 0      FLUoxetine (PROZAC) 20 MG capsule TAKE THREE CAPSULES BY MOUTH DAILY  Qty: 90 capsule, Refills: 1      furosemide (LASIX) 40 MG tablet Take 1 tablet (40 mg total) by mouth 2 (two) times daily.  Qty: 60 tablet, Refills: 11      hydrocodone-acetaminophen 10-325mg (NORCO)  mg Tab Take 2 tablets by mouth every 4 (four) hours as needed for Pain.       levothyroxine (SYNTHROID) 112 MCG tablet Take 1 tablet (112 mcg total) by mouth before breakfast.  Qty: 90 tablet, Refills: 3    Associated Diagnoses: Primary hypothyroidism      methenamine (HIPREX) 1 gram Tab Take 1 tablet (1 g total) by mouth 2 (two) times daily.  Qty: 60 tablet, Refills: 11    Associated Diagnoses: Recurrent UTI      ondansetron (ZOFRAN) 8 MG tablet TAKE ONE TABLET BY MOUTH EVERY TWELVE HOURS AS NEEDED FOR NAUSEA  Qty: 60 tablet, Refills: 2    Associated Diagnoses: Nausea      oxybutynin (DITROPAN XL) 15 MG TR24 Take 1 tablet (15 mg total) by mouth once daily.  Qty: 30 tablet, Refills: 6    Associated Diagnoses: Bladder spasm      pantoprazole (PROTONIX) 40 MG tablet Take 1 tablet (40 mg total) by mouth once daily.  Qty: 90 tablet, Refills: 3    Associated Diagnoses: Esophageal dysphagia      potassium citrate (UROCIT-K) 5 mEq (540 mg) TbSR Take 1 tablet (5 mEq total) by mouth 3 (three) times daily with meals.  Qty: 90 tablet, Refills: 11    Associated Diagnoses: Abnormal urine sediment      ranitidine (ZANTAC)  150 MG capsule Take 1 capsule (150 mg total) by mouth 2 (two) times daily.  Qty: 60 capsule, Refills: 2      SENNOSIDES (SENNA LAX ORAL) Take 20 mg by mouth every evening.       tiZANidine (ZANAFLEX) 4 MG tablet       traZODone (DESYREL) 100 MG tablet Take 2 tablets (200 mg total) by mouth every evening.  Qty: 120 tablet, Refills: 4    Associated Diagnoses: Insomnia, unspecified type      aspirin (ECOTRIN) 81 MG EC tablet Take 1 tablet (81 mg total) by mouth once daily.  Refills: 0      hydrOXYzine HCl (ATARAX) 25 MG tablet Take 1 tablet (25 mg total) by mouth 3 (three) times daily as needed for Itching.  Qty: 30 tablet, Refills: 1    Associated Diagnoses: Nausea vomiting and diarrhea; Generalized abdominal pain         STOP taking these medications       lamotrigine (LAMICTAL) 25 MG tablet Comments:   Reason for Stopping:               Discharge Procedure Orders  Ambulatory referral to Outpatient Case Management   Referral Priority: Routine Referral Type: Consultation   Referral Reason: Specialty Services Required    Number of Visits Requested: 1      Call MD for:  persistent nausea and vomiting or diarrhea     Call MD for:  severe uncontrolled pain     Call MD for:  redness, tenderness, or signs of infection (pain, swelling, redness, odor or green/yellow discharge around incision site)     Call MD for:  difficulty breathing or increased cough     Call MD for:  severe persistent headache     Call MD for:  worsening rash     Call MD for:  persistent dizziness, light-headedness, or visual disturbances     Call MD for:  increased confusion or weakness     No dressing needed       Follow-up Information     Shireen Mayo MD In 2 weeks.    Specialty:  Urology  Why:  post op cysto botox, macroplastique   Contact information:  [address]  [facility] [address]  [phone]                 As above.

## 2018-03-20 NOTE — OP NOTE
[facility]  Operative Note    Date: 03/20/2018    Pre-Op Diagnosis:  Neurogenic bladder     Patient Active Problem List    Diagnosis Date Noted    Chronic combined systolic and diastolic congestive heart failure 03/08/2018    Suprapubic catheter 02/01/2018    Insomnia due to medical condition 12/08/2017    Bruit of right carotid artery 11/14/2017    Bilateral carotid artery disease 11/14/2017    Dysphagia 07/21/2017    Scoliosis deformity of spine 03/31/2017    S/P insertion of spinal cord stimulator 03/31/2017    S/P insertion of intrathecal pump 03/31/2017    Paresthesia of both lower extremities 03/31/2017    Degenerative disc disease, lumbar 03/31/2017    Osteoarthritis of spine with radiculopathy, lumbar region 03/31/2017    Facet arthropathy, lumbar 03/31/2017    Lymphedema of both lower extremities 03/02/2017    Primary hypothyroidism 02/02/2017    Frequent falls 02/01/2017    Weight loss, unintentional 02/01/2017    Urinary retention 12/21/2016    Neurogenic bladder 09/27/2016    Drug-induced constipation 08/16/2016    GERD (gastroesophageal reflux disease) 08/16/2016    Coronary artery disease involving native coronary artery of native heart without angina pectoris 08/16/2016    Narcotic dependency, continuous 07/18/2014    Thoracic aorta atherosclerosis 07/18/2014    Cervical myelopathy 05/13/2013    Chronic pain syndrome 05/01/2013    Major depressive disorder, recurrent, mild 02/21/2013    Generalized anxiety disorder     Iron deficiency anemia           Post-Op Diagnosis: same    Procedure(s) Performed:   1.  Cystoscopy with bladder botox injection 200 U injected  2.  Bladder biopsy and fulguration   3.  Injection of urethral bulking agent-- Macroplastique, 2.5 ml injected into the urethra  4.  Exchange of suprapubic catheter by MD    Specimen(s): posterior wall bladder biopsy    Staff Surgeon:  Shireen Mayo MD    Assistant Surgeon: Seferino Colin,  MD    Anesthesia: Monitored Local Anesthesia with Sedation    Indications: Tasha Hawley is a 62 y.o. female with neurogenic bladder with suprapubic catheter in place with stress urinary incontinence on previous urodynamics.    Findings:    - area of catheter irritation on posterior wall, biopsied and fulgurated.     - 200 U botox injected   - good coaptation of urethra after macroplastique injection   - suprapubic catheter exchanged    Estimated Blood Loss: min    Drains: 20 fr silicone suprapubic catheter     Procedure in Detail:  After informed consent was obtained the patient was brought to the cystoscopy suite and placed in the supine position.  SCDs were applied and working.   The patient was placed in the dorsal lithotomy position prior to anesthetic administration.  She was then anesthetized and prepped and draped in the usual sterile fashion.      A rigid cystoscope in a 22 Fr sheath was introduced into the patients's bladder via the urethra.  This passed easily.  Formal cystoscopy was performed which revealed the ureteral orifices in their normal anatomic position bilaterally.  No bladder trabeculations, stones or diverticuli were seen.  There was an area of what appeared to be catheter irritation on the posterior wall.  This was biopsied with cold cup biopsy forceps and fulgurated with Bugbee electrocautery.  The specimens were sent to pathology.      We then proceeded with botox injection.  200 units of botox in 20 cc was injected into the detrusor muscle throughout the bladder.  Good wheals were raised.  The scope was removed.       A collagen injection cystoscope in a 22 Fr injection sheath was inserted into the urethra and advanced into the urinary bladder. Formal cystoscopy revealed normal bladder mucosa. The ureteral orifices were in the normal anatomic position bilaterally, effluxing clear urine. The Uroplasty rigid needle was inserted into the scope and the scope was backed out into the  urethra. Marcroplastique was injected at the 5 and 7 o'clock regions transurethrally. Good coaptation of the urethra was seen after injection. The scope was removed from the bladder.      The suprapubic catheter was then exchanged under sterile conditions for a new 20 fr silicone catheter filled with 20 ml sterile water.      The patient tolerated the procedure well and was transferred to recovery in stable condition.     Disposition:  The patient will follow up with Dr. Mayo in 2 weeks.      MD ANH Damico was present for the entire case and agree with the above note.

## 2018-03-21 NOTE — ANESTHESIA POSTPROCEDURE EVALUATION
"Anesthesia Post Evaluation    Patient: Tasha Hawley    Procedure(s) Performed: Procedure(s) (LRB):  CYSTOSCOPY (N/A)  INJECTION-BULKING AGENT URETHRAL  OUTLET (N/A)  INJECTION-BOTOX (N/A)  BIOPSY-BLADDER (N/A)    Final Anesthesia Type: general  Patient location during evaluation: PACU  Patient participation: Yes- Able to Participate  Level of consciousness: awake and alert  Post-procedure vital signs: reviewed and stable  Pain management: adequate  Airway patency: patent  PONV status at discharge: No PONV  Anesthetic complications: no      Cardiovascular status: blood pressure returned to baseline  Respiratory status: unassisted  Hydration status: euvolemic  Follow-up not needed.        Visit Vitals  BP (!) 120/59   Pulse 67   Temp 36.6 °C (97.8 °F) (Temporal)   Resp 18   Ht 5' 4" (1.626 m)   Wt 77.1 kg (170 lb)   LMP  (LMP Unknown)   SpO2 99%   Breastfeeding? No   BMI 29.18 kg/m²       Pain/Low Score: Pain Assessment Performed: Yes (3/20/2018 11:40 AM)  Presence of Pain: non-verbal indicators absent (3/20/2018 11:40 AM)  Low Score: 10 (3/20/2018 11:40 AM)      "

## 2018-03-21 NOTE — PROGRESS NOTES
Thank you for the referral. The following patient has been assigned to Fannie Marshall RN with Outpatient Complex Care Management for high risk screening.    Reason for referral: Neurogenic bladder  Coronary artery disease involving native coronary artery of native heart without angina pectoris    Please contact [facility] at [phone] with any questions.    Thank you,    Jasmin Mccauley, [facility]  Outpatient Complex Care Mgmt  Ext [phone]

## 2018-03-22 ENCOUNTER — TELEPHONE (OUTPATIENT)
Dept: ENDOSCOPY | Facility: HOSPITAL | Age: 63
End: 2018-03-22

## 2018-03-22 NOTE — TELEPHONE ENCOUNTER
----- Message from Maria Esther Lutz MA sent at 3/21/2018  3:39 PM CDT -----  Contact: Self- [phone]  Patient has order for procedures.  ----- Message -----  From: Loren Lopez  Sent: 3/21/2018   3:30 PM  To: Pinky YEN Staff    Pinky- pt called to speak with Maria Esther about scheduling a swallow test- please call pt back at [phone]

## 2018-03-22 NOTE — TELEPHONE ENCOUNTER
Spoke wit  regarding ordered EGD/Manometry.  stated patient was sleeping, and he would call back to schedule after speaking with her. Direct number given.

## 2018-03-23 ENCOUNTER — OUTPATIENT CASE MANAGEMENT (OUTPATIENT)
Dept: ADMINISTRATIVE | Facility: OTHER | Age: 63
End: 2018-03-23

## 2018-03-23 ENCOUNTER — TELEPHONE (OUTPATIENT)
Dept: UROLOGY | Facility: CLINIC | Age: 63
End: 2018-03-23

## 2018-03-23 NOTE — TELEPHONE ENCOUNTER
----- Message from Suyapa Batista sent at 3/23/2018 11:36 AM CDT -----  Contact: pt's friend Ary [phone]  Pt was speaking in the back ground of the call stating yesterday she started coughing up mucus with some blood in it and today she is coughing up straight blood. Pt states she had a procedure on 03/20/18 and would like to know if a tube was put down her throat and may be the reason for her coughing up blood or if she should go to the ED . Please call pt

## 2018-03-23 NOTE — PROGRESS NOTES
Attempted to perform initial assessment for OPCM; talked to daughter Lisa Thompson.  Lisa asked that this RN call back later next week.  LULI Marshall, OPCM-RN

## 2018-03-26 ENCOUNTER — HOSPITAL ENCOUNTER (EMERGENCY)
Facility: HOSPITAL | Age: 63
Discharge: HOME | End: 2018-03-26
Payer: MEDICARE

## 2018-03-26 ENCOUNTER — TELEPHONE (OUTPATIENT)
Dept: GASTROENTEROLOGY | Facility: CLINIC | Age: 63
End: 2018-03-26

## 2018-03-26 ENCOUNTER — OUTPATIENT CASE MANAGEMENT (OUTPATIENT)
Dept: ADMINISTRATIVE | Facility: OTHER | Age: 63
End: 2018-03-26

## 2018-03-26 VITALS
HEIGHT: 64 IN | OXYGEN SATURATION: 99 % | DIASTOLIC BLOOD PRESSURE: 72 MMHG | WEIGHT: 172 LBS | HEART RATE: 86 BPM | SYSTOLIC BLOOD PRESSURE: 124 MMHG | TEMPERATURE: 98 F | BODY MASS INDEX: 29.37 KG/M2 | RESPIRATION RATE: 20 BRPM

## 2018-03-26 DIAGNOSIS — R50.9 FEVER: ICD-10-CM

## 2018-03-26 DIAGNOSIS — R05.9 COUGH: ICD-10-CM

## 2018-03-26 DIAGNOSIS — R31.9 URINARY TRACT INFECTION WITH HEMATURIA, SITE UNSPECIFIED: Primary | ICD-10-CM

## 2018-03-26 DIAGNOSIS — N39.0 URINARY TRACT INFECTION WITH HEMATURIA, SITE UNSPECIFIED: Primary | ICD-10-CM

## 2018-03-26 DIAGNOSIS — K92.2 GASTROINTESTINAL HEMORRHAGE, UNSPECIFIED GASTROINTESTINAL HEMORRHAGE TYPE: ICD-10-CM

## 2018-03-26 LAB
ALBUMIN SERPL BCP-MCNC: 4.1 G/DL
ALP SERPL-CCNC: 142 U/L
ALT SERPL W/O P-5'-P-CCNC: 33 U/L
ANION GAP SERPL CALC-SCNC: 12 MMOL/L
AST SERPL-CCNC: 32 U/L
BACTERIA #/AREA URNS HPF: ABNORMAL /HPF
BASOPHILS # BLD AUTO: 0.01 K/UL
BASOPHILS NFR BLD: 0.2 %
BILIRUB SERPL-MCNC: 0.6 MG/DL
BILIRUB UR QL STRIP: NEGATIVE
BUN SERPL-MCNC: 7 MG/DL
CALCIUM SERPL-MCNC: 10 MG/DL
CHLORIDE SERPL-SCNC: 100 MMOL/L
CLARITY UR: CLEAR
CO2 SERPL-SCNC: 25 MMOL/L
COLOR UR: ABNORMAL
CREAT SERPL-MCNC: 0.8 MG/DL
DIFFERENTIAL METHOD: ABNORMAL
EOSINOPHIL # BLD AUTO: 0.3 K/UL
EOSINOPHIL NFR BLD: 6.5 %
ERYTHROCYTE [DISTWIDTH] IN BLOOD BY AUTOMATED COUNT: 14.4 %
EST. GFR  (AFRICAN AMERICAN): >60 ML/MIN/1.73 M^2
EST. GFR  (NON AFRICAN AMERICAN): >60 ML/MIN/1.73 M^2
FLUAV AG SPEC QL IA: NEGATIVE
FLUBV AG SPEC QL IA: NEGATIVE
GLUCOSE SERPL-MCNC: 80 MG/DL
GLUCOSE UR QL STRIP: NEGATIVE
HCT VFR BLD AUTO: 38.3 %
HGB BLD-MCNC: 11.9 G/DL
HGB UR QL STRIP: ABNORMAL
HYALINE CASTS #/AREA URNS LPF: 0 /LPF
KETONES UR QL STRIP: NEGATIVE
LEUKOCYTE ESTERASE UR QL STRIP: ABNORMAL
LYMPHOCYTES # BLD AUTO: 1.4 K/UL
LYMPHOCYTES NFR BLD: 28 %
MCH RBC QN AUTO: 27.7 PG
MCHC RBC AUTO-ENTMCNC: 31.1 G/DL
MCV RBC AUTO: 89 FL
MICROSCOPIC COMMENT: ABNORMAL
MONOCYTES # BLD AUTO: 0.5 K/UL
MONOCYTES NFR BLD: 10.3 %
NEUTROPHILS # BLD AUTO: 2.8 K/UL
NEUTROPHILS NFR BLD: 55 %
NITRITE UR QL STRIP: NEGATIVE
OB PNL STL: POSITIVE
PH UR STRIP: 6 [PH] (ref 5–8)
PLATELET # BLD AUTO: 45 K/UL
PMV BLD AUTO: 11.6 FL
POTASSIUM SERPL-SCNC: 4.8 MMOL/L
PROT SERPL-MCNC: 8.5 G/DL
PROT UR QL STRIP: ABNORMAL
RBC # BLD AUTO: 4.29 M/UL
RBC #/AREA URNS HPF: 50 /HPF (ref 0–4)
SODIUM SERPL-SCNC: 137 MMOL/L
SP GR UR STRIP: 1.01 (ref 1–1.03)
SPECIMEN SOURCE: NORMAL
SQUAMOUS #/AREA URNS HPF: 4 /HPF
URN SPEC COLLECT METH UR: ABNORMAL
UROBILINOGEN UR STRIP-ACNC: NEGATIVE EU/DL
WBC # BLD AUTO: 5.07 K/UL
WBC #/AREA URNS HPF: 50 /HPF (ref 0–5)
WBC CLUMPS URNS QL MICRO: ABNORMAL
YEAST URNS QL MICRO: ABNORMAL

## 2018-03-26 PROCEDURE — 99284 EMERGENCY DEPT VISIT MOD MDM: CPT

## 2018-03-26 PROCEDURE — 81000 URINALYSIS NONAUTO W/SCOPE: CPT

## 2018-03-26 PROCEDURE — 82272 OCCULT BLD FECES 1-3 TESTS: CPT

## 2018-03-26 PROCEDURE — 25000003 PHARM REV CODE 250

## 2018-03-26 PROCEDURE — 85025 COMPLETE CBC W/AUTO DIFF WBC: CPT

## 2018-03-26 PROCEDURE — 80053 COMPREHEN METABOLIC PANEL: CPT

## 2018-03-26 PROCEDURE — 87400 INFLUENZA A/B EACH AG IA: CPT | Mod: 59

## 2018-03-26 RX ORDER — CEPHALEXIN 500 MG/1
500 CAPSULE ORAL
Status: COMPLETED | OUTPATIENT
Start: 2018-03-26 | End: 2018-03-26

## 2018-03-26 RX ORDER — TIZANIDINE 4 MG/1
4 TABLET ORAL ONCE
Status: COMPLETED | OUTPATIENT
Start: 2018-03-26 | End: 2018-03-26

## 2018-03-26 RX ORDER — HYDROCODONE BITARTRATE AND ACETAMINOPHEN 10; 325 MG/1; MG/1
1 TABLET ORAL
Status: COMPLETED | OUTPATIENT
Start: 2018-03-26 | End: 2018-03-26

## 2018-03-26 RX ORDER — CEPHALEXIN 500 MG/1
500 CAPSULE ORAL 4 TIMES DAILY
Qty: 20 CAPSULE | Refills: 0 | Status: SHIPPED | OUTPATIENT
Start: 2018-03-26 | End: 2018-03-31

## 2018-03-26 RX ORDER — ONDANSETRON 4 MG/1
4 TABLET, ORALLY DISINTEGRATING ORAL ONCE
Status: COMPLETED | OUTPATIENT
Start: 2018-03-26 | End: 2018-03-26

## 2018-03-26 RX ADMIN — ONDANSETRON 4 MG: 4 TABLET, ORALLY DISINTEGRATING ORAL at 08:03

## 2018-03-26 RX ADMIN — CEPHALEXIN 500 MG: 500 CAPSULE ORAL at 10:03

## 2018-03-26 RX ADMIN — TIZANIDINE 4 MG: 4 TABLET ORAL at 08:03

## 2018-03-26 RX ADMIN — HYDROCODONE BITARTRATE AND ACETAMINOPHEN 1 TABLET: 10; 325 TABLET ORAL at 08:03

## 2018-03-26 NOTE — TELEPHONE ENCOUNTER
----- Message from Prince Vance MD sent at 3/26/2018  8:34 AM CDT -----  Contact: Self- [phone]  Spitting up blood needs to be evaluated in ER. Please advise patient.  ----- Message -----  From: Padmini Simon MA  Sent: 3/26/2018   8:15 AM  To: Prince Vance MD        ----- Message -----  From: Loren Lopez  Sent: 3/26/2018   8:05 AM  To: Pinky Vance- pt called to schedule an ep appt- says she has been sick for a week now and spitting up blood- please call pt back at [phone]

## 2018-03-26 NOTE — PROGRESS NOTES
Talked to daughter Lisa Thompson at [phone] to perform initial assessment for OPCM.  Lisa stated that she did talk to her mother this weekend in regards to enrolling in OPCM.  Mother stated that all of her needs are being met at this time and has declined OPCM services.  Encouraged patient/Lisa to call this RN if having any questions/concerns.  Case closed.  LULI Marshall, OPCM-RN

## 2018-03-27 ENCOUNTER — TELEPHONE (OUTPATIENT)
Dept: GASTROENTEROLOGY | Facility: CLINIC | Age: 63
End: 2018-03-27

## 2018-03-27 NOTE — TELEPHONE ENCOUNTER
----- Message from Waldo Winters sent at 3/27/2018 12:39 PM CDT -----  Contact: Pt:[phone]  Pt called and states she would like to speak with the nurse in regards to her spitting up blood.

## 2018-03-27 NOTE — ED PROVIDER NOTES
Encounter Date: 3/26/2018    SCRIBE #1 NOTE: ISherrie, am scribing for, and in the presence of, Dr. Reyes.       History     Chief Complaint   Patient presents with    Fever     c/o fever, spitting up blood, neck pain, decreased appetite, and diarrhea x6 days     62 y.o. female with PMHx of chronic BLE edema who presents to the ED with a complaint of multiple medical problems.  Pt states she has been spitting up blood for 6 days along with fever, left-sided neck pain, SOB, lightheadedness, and near syncope.  Pt report neck pain is worsening, radiating to shoulders and had been radiating to her head the last few days.  She also reports diarrhea, vomiting for 6 days and states her urine has been very dark lately.  Pt states she called her GI doctor and was told her to come to the ED.  Pt's  states she had a cystoscopy on 3/20 and her health has been getting worse since the procedure.        The history is provided by the spouse and the patient.     Review of patient's allergies indicates:   Allergen Reactions    (d)-limonene flavor      Other reaction(s): difficult intubation  Other reaction(s): Difficulty breathing    Bactrim [sulfamethoxazole-trimethoprim] Anaphylaxis    Imitrex [sumatriptan succinate] Shortness Of Breath    Penicillins Shortness Of Breath     Other reaction(s): Jittery    Topamax [topiramate] Shortness Of Breath    Vancomycin Shortness Of Breath     Rash    Butorphanol tartrate     Darvocet a500 [propoxyphene n-acetaminophen]      Other reaction(s): Difficulty breathing    Fentanyl      Other reaction(s): Vomiting  Other reaction(s): Nausea  Other reaction(s): Itching  swelling    White petrolatum-zinc     Zinc oxide-white petrolatum      Other reaction(s): Difficulty breathing    Latex, natural rubber Itching and Rash     Past Medical History:   Diagnosis Date    Anticoagulant long-term use     Anxiety     Arthritis     Bilateral lower extremity edema     severe  chronic    Carotid artery occlusion     Cataract     Depression     Fever blister     Hypothyroid     Iron deficiency anemia     Lumbar radiculopathy     with chronic pain    Ocular migraine     Sleep apnea     cpap     Past Surgical History:   Procedure Laterality Date    BACK SURGERY      x 12    CATARACT EXTRACTION W/  INTRAOCULAR LENS IMPLANT Left     Dr Coleman     HYSTERECTOMY  1975    endometriosis    pain pump placement      SQ Dilaudid Pump managed by Dr. Hillman, Pain Management    SPINAL CORD STIMULATOR REMOVAL      before [place]    SPINE SURGERY  5-13-13    CERVICAL FUSION     Family History   Problem Relation Age of Onset    Cancer Mother 55     breast    Cancer Father      esophagus,had laryngectomy    Esophageal cancer Father     Parkinsonism Maternal Grandmother     Tremor Maternal Grandmother     No Known Problems Brother     No Known Problems Brother     Heart disease Maternal Uncle     No Known Problems Sister     No Known Problems Maternal Aunt     No Known Problems Paternal Aunt     No Known Problems Paternal Uncle     No Known Problems Maternal Grandfather     No Known Problems Paternal Grandmother     No Known Problems Paternal Grandfather     Melanoma Neg Hx     Amblyopia Neg Hx     Blindness Neg Hx     Cataracts Neg Hx     Diabetes Neg Hx     Glaucoma Neg Hx     Hypertension Neg Hx     Macular degeneration Neg Hx     Retinal detachment Neg Hx     Strabismus Neg Hx     Stroke Neg Hx     Thyroid disease Neg Hx     Colon cancer Neg Hx      Social History   Substance Use Topics    Smoking status: Never Smoker    Smokeless tobacco: Never Used    Alcohol use No      Comment: denies     Review of Systems   Constitutional: Positive for fever (resolved).   Respiratory: Positive for shortness of breath.    Cardiovascular: Positive for leg swelling (chronic). Negative for chest pain.   Gastrointestinal: Positive for diarrhea and vomiting.    Musculoskeletal: Positive for neck pain (left-sided).   Neurological: Positive for light-headedness.   All other systems reviewed and are negative.      Physical Exam     Initial Vitals [03/26/18 1737]   BP Pulse Resp Temp SpO2   (!) 94/45 (!) 58 20 97.9 °F (36.6 °C) 96 %      MAP       61.33         Physical Exam    Nursing note and vitals reviewed.  Constitutional: She appears well-developed.   HENT:   Head: Normocephalic and atraumatic.   Eyes: EOM are normal.   Neck: Neck supple.   Cardiovascular: Normal rate and regular rhythm.   Pulmonary/Chest: Breath sounds normal. No respiratory distress.   Abdominal: Soft. She exhibits no distension. There is no tenderness. There is no guarding.   RLQ mass consistent with hx of hydromorphone pump.  Catheter in suprapubic area, clean, dry, and intact.     Genitourinary: Rectum normal. Rectal exam shows anal tone normal.   Genitourinary Comments: rectal tone normal; soft brown stool in vault   Musculoskeletal: Normal range of motion.   No acute deformity. Left trapezius spasm associated with neck pain which precisely producible pain.   Neurological: She is alert and oriented to person, place, and time.   Skin: Skin is warm and dry.   Psychiatric: She has a normal mood and affect.         ED Course   Procedures  Labs Reviewed   CBC W/ AUTO DIFFERENTIAL - Abnormal; Notable for the following:        Result Value    Hemoglobin 11.9 (*)     MCHC 31.1 (*)     Platelets 45 (*)     All other components within normal limits   COMPREHENSIVE METABOLIC PANEL - Abnormal; Notable for the following:     BUN, Bld 7 (*)     Total Protein 8.5 (*)     Alkaline Phosphatase 142 (*)     All other components within normal limits   URINALYSIS - Abnormal; Notable for the following:     Protein, UA 1+ (*)     Occult Blood UA 3+ (*)     Leukocytes, UA 2+ (*)     All other components within normal limits   OCCULT BLOOD X 1, STOOL - Abnormal; Notable for the following:     Occult Blood Positive (*)      All other components within normal limits   URINALYSIS MICROSCOPIC - Abnormal; Notable for the following:     RBC, UA 50 (*)     WBC, UA 50 (*)     WBC Clumps, UA Occasional (*)     Bacteria, UA Few (*)     Yeast, UA Few (*)     All other components within normal limits   INFLUENZA A AND B ANTIGEN        Imaging Results          X-Ray Chest PA And Lateral (In process)                   Medical Decision Making:   History:   Old Medical Records: I decided to obtain old medical records.  Initial Assessment:   62 y.o. female presents with multiple medical problems.  She complaints of splitting up blood, left-sided neck pain, diarrhea and vomiting for 6 days.    Clinical Tests:   Lab Tests: Reviewed and Ordered  Radiological Study: Ordered and Reviewed  ED Management:  9:27 PM   Musculoskeletal findings consistent with left trapezius spasm.  Patient will continue her existing medications with which she is well supplied.  Urinary findings consistent with UTI.  No prior sensitivity to cephalexin which pt has tolerated in the past.  She is hemoccult positive.  Paged Dr. Vance for consult.  Discussed with Dr. Frankel, on call for Dr. Vance, who recommends discharge home with outpatient follow-up in clinic.    Patient is nontoxic appearing in the ED.  No persistent emergent issues detected.  Exam benign.  We will discharge home to follow up with primary care, urology, and gastroenterology.  Patient will return to the ED as needed for any deterioration or any other concerns.  Verbal discharge instructions and return precautions given.   Other:   I have discussed this case with another health care provider.                      Clinical Impression:   The primary encounter diagnosis was Urinary tract infection with hematuria, site unspecified. Diagnoses of Cough, Fever, and Gastrointestinal hemorrhage, unspecified gastrointestinal hemorrhage type were also pertinent to this visit.       I, Krishan Reyes, personally  performed the services described in this documentation. All medical record entries made by the scribe were at my direction and in my presence.  I have reviewed the chart and agree that the record reflects my personal performance and is accurate and complete. Krishan Reyes M.D.               Krishan Reyes MD  03/26/18 5535

## 2018-04-02 ENCOUNTER — TELEPHONE (OUTPATIENT)
Dept: GASTROENTEROLOGY | Facility: CLINIC | Age: 63
End: 2018-04-02

## 2018-04-02 NOTE — TELEPHONE ENCOUNTER
----- Message from Prerna Sesay sent at 4/2/2018  5:02 PM CDT -----  Contact: PT  PT is calling needing to come in soon, she missed her last check up appointment    Callback: [phone] or [phone]

## 2018-04-03 ENCOUNTER — OFFICE VISIT (OUTPATIENT)
Dept: PSYCHIATRY | Facility: CLINIC | Age: 63
End: 2018-04-03
Payer: MEDICARE

## 2018-04-03 DIAGNOSIS — F33.0 MAJOR DEPRESSIVE DISORDER, RECURRENT, MILD: Primary | ICD-10-CM

## 2018-04-03 DIAGNOSIS — G47.00 INSOMNIA, UNSPECIFIED TYPE: ICD-10-CM

## 2018-04-03 PROCEDURE — 99499 UNLISTED E&M SERVICE: CPT | Mod: S$PBB,,, | Performed by: PSYCHIATRY & NEUROLOGY

## 2018-04-03 PROCEDURE — 99999 PR PBB SHADOW E&M-EST. PATIENT-LVL II: CPT | Mod: PBBFAC,,, | Performed by: PSYCHIATRY & NEUROLOGY

## 2018-04-03 PROCEDURE — 99214 OFFICE O/P EST MOD 30 MIN: CPT | Mod: S$GLB,,, | Performed by: PSYCHIATRY & NEUROLOGY

## 2018-04-03 RX ORDER — QUETIAPINE FUMARATE 25 MG/1
TABLET, FILM COATED ORAL
Qty: 90 TABLET | Refills: 1 | Status: SHIPPED | OUTPATIENT
Start: 2018-04-03 | End: 2018-05-17

## 2018-04-03 RX ORDER — TRAZODONE HYDROCHLORIDE 100 MG/1
200 TABLET ORAL NIGHTLY
Qty: 120 TABLET | Refills: 4 | Status: SHIPPED | OUTPATIENT
Start: 2018-04-03 | End: 2018-07-18 | Stop reason: SDUPTHER

## 2018-04-03 RX ORDER — FLUOXETINE HYDROCHLORIDE 20 MG/1
60 CAPSULE ORAL DAILY
Qty: 90 CAPSULE | Refills: 1 | Status: SHIPPED | OUTPATIENT
Start: 2018-04-03 | End: 2018-07-18 | Stop reason: SDUPTHER

## 2018-04-03 NOTE — PROGRESS NOTES
Outpatient Psychiatry Follow-Up Visit (MD/NP)    4/3/2018    Clinical Status of Patient:  Outpatient (Ambulatory)    Chief Complaint:  Tasah Hawley is a 62 y.o. female who presents today for follow-up of depression and anxiety.  Met with patient.      Interval History and Content of Current Session:  Interim Events/Subjective Report/Content of Current Session: She continjues to have multiple medical problems.  Her chronic pback pain is giving her the most trouble.  Last week she was in the ER with coughing up blood and blood n her urine,  She complains of poor sleep and being anxious and shakey during the day.  We reviewed options and decided to try a small dose of Seroquel.  I pointed out that it is sedating and asked her to be careful in regards to falls.    Psychotherapy:  · Target symptoms: depression, anxiety   · Why chosen therapy is appropriate versus another modality: relevant to diagnosis  · Outcome monitoring methods: self-report, observation  · Therapeutic intervention type: supportive psychotherapy  · Topics discussed/themes: stress related to medical comorbidities, building skills sets for symptom management, symptom recognition  · The patient's response to the intervention is accepting. The patient's progress toward treatment goals is fair.   · Duration of intervention: 10 minutes.    Review of Systems   · PSYCHIATRIC: Pertinant items are noted in the narrative.    Past Medical, Family and Social History: The patient's past medical, family and social history have been reviewed and updated as appropriate within the electronic medical record - see encounter notes.    Compliance: yes    Side effects: None    Risk Parameters:  Patient reports no suicidal ideation  Patient reports no homicidal ideation  Patient reports no self-injurious behavior  Patient reports no violent behavior    Exam (detailed: at least 9 elements; comprehensive: all 15 elements)   Constitutional  Vitals:  Most recent vital  signs, dated less than 90 days prior to this appointment, were reviewed.   There were no vitals filed for this visit.     General:  unremarkable, age appropriate, has been losing weight     Musculoskeletal  Muscle Strength/Tone:  no tremor   Gait & Station:  uses walker     Psychiatric  Speech:  no latency; no press   Mood & Affect:  anxious  congruent and appropriate   Thought Process:  normal and logical   Associations:  intact   Thought Content:  normal, no suicidality, no homicidality, delusions, or paranoia   Insight:  intact   Judgement: behavior is adequate to circumstances   Orientation:  grossly intact   Memory: intact for content of interview   Language: grossly intact   Attention Span & Concentration:  able to focus   Fund of Knowledge:  intact and appropriate to age and level of education     Assessment and Diagnosis   Status/Progress: Based on the examination today, the patient's problem(s) is/are adequately but not ideally controlled.  New problems have not been presented today.   Co-morbidities are complicating management of the primary condition.  There are no active rule-out diagnoses for this patient at this time.     General Impression: Coping as well as can be expected      ICD-10-CM ICD-9-CM   1. Insomnia, unspecified type G47.00 780.52       Intervention/Counseling/Treatment Plan   · Medication Management: Continue current medications. The risks and benefits of medication were discussed with the patient. and add Seroquel 25 mg 1 in AM and 2 at bedtime.      Return to Clinic: 3 months

## 2018-04-04 ENCOUNTER — PES CALL (OUTPATIENT)
Dept: ADMINISTRATIVE | Facility: CLINIC | Age: 63
End: 2018-04-04

## 2018-04-05 ENCOUNTER — TELEPHONE (OUTPATIENT)
Dept: ENDOSCOPY | Facility: HOSPITAL | Age: 63
End: 2018-04-05

## 2018-04-05 ENCOUNTER — OFFICE VISIT (OUTPATIENT)
Dept: PSYCHIATRY | Facility: CLINIC | Age: 63
End: 2018-04-05
Payer: MEDICARE

## 2018-04-05 ENCOUNTER — OFFICE VISIT (OUTPATIENT)
Dept: GASTROENTEROLOGY | Facility: CLINIC | Age: 63
End: 2018-04-05
Payer: MEDICARE

## 2018-04-05 VITALS
BODY MASS INDEX: 26.12 KG/M2 | HEIGHT: 64 IN | DIASTOLIC BLOOD PRESSURE: 57 MMHG | HEART RATE: 57 BPM | SYSTOLIC BLOOD PRESSURE: 100 MMHG | WEIGHT: 153 LBS

## 2018-04-05 DIAGNOSIS — R45.4 IRRITABILITY AND ANGER: ICD-10-CM

## 2018-04-05 DIAGNOSIS — R11.2 NAUSEA AND VOMITING, INTRACTABILITY OF VOMITING NOT SPECIFIED, UNSPECIFIED VOMITING TYPE: ICD-10-CM

## 2018-04-05 DIAGNOSIS — F33.0 MAJOR DEPRESSIVE DISORDER, RECURRENT, MILD: Primary | ICD-10-CM

## 2018-04-05 DIAGNOSIS — F41.1 GENERALIZED ANXIETY DISORDER: ICD-10-CM

## 2018-04-05 DIAGNOSIS — R13.19 ESOPHAGEAL DYSPHAGIA: ICD-10-CM

## 2018-04-05 DIAGNOSIS — R04.2 SPITTING UP BLOOD: Primary | ICD-10-CM

## 2018-04-05 PROCEDURE — 90834 PSYTX W PT 45 MINUTES: CPT | Mod: S$GLB,,, | Performed by: PSYCHOLOGIST

## 2018-04-05 PROCEDURE — 99999 PR PBB SHADOW E&M-EST. PATIENT-LVL IV: CPT | Mod: PBBFAC,,, | Performed by: PHYSICIAN ASSISTANT

## 2018-04-05 PROCEDURE — 99214 OFFICE O/P EST MOD 30 MIN: CPT | Mod: S$GLB,,, | Performed by: PHYSICIAN ASSISTANT

## 2018-04-05 PROCEDURE — 99999 PR PBB SHADOW E&M-EST. PATIENT-LVL I: CPT | Mod: PBBFAC,,, | Performed by: PSYCHOLOGIST

## 2018-04-05 NOTE — PROGRESS NOTES
Individual Psychotherapy (PhD/LCSW)    4/5/2018    Site:  [facility]         Therapeutic Intervention: Met with patient and spouse.  Outpatient - Insight oriented psychotherapy 45 min - CPT code 98521    Chief complaint/reason for encounter: depression, anger and anxiety     Interval history and content of current session:  Tasha arrived on time for her scheduled appointment.  Her  was also present in this session.  She reports worsened mood and pain in the past month.  She was taking care of a squirrel, which brought her much vinay, but the squirrel passed away today.  She continues to experience depression, anxiety, irritability, and marital problems.  She and her  also endorse OCPD symptoms in her.  She could not recall the content from our last session and forgot her therapy folder.  She was reminded of the strategies we discussed.  However, it was difficult for her to take in the information even with reminders.  She ruminated on similar problems she has discussed previously in session.  She and her  were encouraged to compromise on issues and promote flexibility, rather than arguing about right vs wrong.  Tasha was strongly encouraged to practice mindfulness of the present moment, rather than ruminating about the past.  She and her  agreed to do one kind thing for each other every day.    Treatment plan:  · Target symptoms: depression, anxiety   · Why chosen therapy is appropriate versus another modality: relevant to diagnosis  · Outcome monitoring methods: self-report  · Therapeutic intervention type: insight oriented psychotherapy    Risk parameters:  Patient reports no suicidal ideation  Patient reports no homicidal ideation  Patient reports no self-injurious behavior  Patient reports no violent behavior    Verbal deficits: None    Patient's response to intervention:  The patient's response to intervention is accepting.    Progress toward goals and other mental status  changes:  The patient's progress toward goals is fair .    Diagnosis:     ICD-10-CM ICD-9-CM   1. Major depressive disorder, recurrent, mild F33.0 296.31   2. Generalized anxiety disorder F41.1 300.02   3. Irritability and anger R45.4 799.22       Plan:  individual psychotherapy    Return to clinic: 1 month    Length of Service (minutes): 45

## 2018-04-05 NOTE — PROGRESS NOTES
[facility] Consultation Note    Reason for Consult:  The primary encounter diagnosis was Spitting up blood. Diagnoses of Esophageal dysphagia and Nausea and vomiting, intractability of vomiting not specified, unspecified vomiting type were also pertinent to this visit.    PCP:   Mesfin Hodges Ii       Referring MD:  No referring provider defined for this encounter.    HPI:  This is a 62 y.o. female here to follow up on her dysphagia  Had to cancel her EGD and esophageal manometry due to sickness.    Today she is doing worse since her 2/2018 visit  Now also spitting up blood  Having dysphagia of solids, liquids, and pills since 1/2017.  + nausea  3/26/18 Occult stool - pos  Taking protonix once daily as well as Zantac as needed    She is a pt of Dr. Vance's  Her 7/2017 EGD Nissen fundoplication wrap is tight and moderate resistance to scope        passage. No evidence of a failed wrap endoscopically. A TTS dilator        was passed through the scope. Dilation with a 15-16.5-18 mm balloon        dilator was performed to 18 mm.    she says the dilation helped for a few months.   She was seen by Dr Llamas who advised repeat dilation prior to surgery    Last EGD 2014 - Nissen fundoplication wrap is tight and        moderate resistance to scope passage. No evidence of a failed wrap        endoscopically. A TTS dilator was passed through the scope. Dilation        with a 15-16.5-18 mm balloon (to a maximum balloon size of 18 mm)        dilator was performed    Large hiatal hernia with a laparoscopic repair with mesh and Nissen fundoplication back in 9/2011    ROS:  Constitutional: No fevers, chills, No weight loss  ENT: No allergies  CV: No chest pain  Pulm: No cough, + shortness of breath  Ophtho: No vision changes  GI: see HPI  Derm: No rash  Heme: No lymphadenopathy, No bruising  MSK: No arthritis  : No dysuria, No hematuria  Endo: No hot or cold intolerance  Neuro: No syncope, No  seizure  Psych: No anxiety, No depression    Medical History:  has a past medical history of Anticoagulant long-term use; Anxiety; Arthritis; Bilateral lower extremity edema; Carotid artery occlusion; Cataract; Depression; Fever blister; Hypothyroid; Iron deficiency anemia; Lumbar radiculopathy; Ocular migraine; and Sleep apnea.    Surgical History:  has a past surgical history that includes Hysterectomy (1975); Back surgery; pain pump placement; Spine surgery (5-13-13); Cataract extraction w/  intraocular lens implant (Left); and Spinal cord stimulator removal.    Family History: family history includes Cancer in her father; Cancer (age of onset: 55) in her mother; Esophageal cancer in her father; Heart disease in her maternal uncle; No Known Problems in her brother, brother, maternal aunt, maternal grandfather, paternal aunt, paternal grandfather, paternal grandmother, paternal uncle, and sister; Parkinsonism in her maternal grandmother; Tremor in her maternal grandmother..     Social History:  reports that she has never smoked. She has never used smokeless tobacco. She reports that she does not drink alcohol or use drugs.    Review of patient's allergies indicates:   Allergen Reactions    Imitrex [sumatriptan succinate] Shortness Of Breath    Penicillins Shortness Of Breath     Other reaction(s): Jittery    Topamax [topiramate] Shortness Of Breath    Vancomycin Shortness Of Breath     Rash    (d)-limonene flavor      Other reaction(s): difficult intubation  Other reaction(s): Jittery  Other reaction(s): Difficulty breathing  Other reaction(s): Difficulty breathing  Other reaction(s): Jittery  Other reaction(s): Difficulty breathing    Bactrim [sulfamethoxazole-trimethoprim] Nausea And Vomiting     Other reaction(s): Resp Depression  Other reaction(s): Anaphylaxis    Butorphanol tartrate     Darvocet a500 [propoxyphene n-acetaminophen]      Other reaction(s): Jittery  Other reaction(s): Difficulty  breathing    Fentanyl      Other reaction(s): Vomiting  Other reaction(s): Nausea  Other reaction(s): Itching  swelling    Phenytoin sodium extended      pATIENT DENIES EVER HAVING THIS MEDICATION    White petrolatum-zinc     Zinc oxide-white petrolatum      Other reaction(s): Difficulty breathing    Latex, natural rubber Itching and Rash       Current Outpatient Prescriptions on File Prior to Visit   Medication Sig Dispense Refill    atorvastatin (LIPITOR) 80 MG tablet TAKE ONE TABLET BY MOUTH EVERY DAY. (Patient taking differently: TAKE ONE TABLET BY MOUTH Nightly) 90 tablet 3    DOC-Q-LACE 100 mg capsule TAKE ONE CAPSULE BY MOUTH THREE TIMES A DAY AS NEEDED FOR CONSTIPATION 90 capsule 3    docosanol (ABREVA) 10 % Crea Apply 1 application topically 2 (two) times daily. 2 g 0    FLUoxetine (PROZAC) 20 MG capsule Take 3 capsules (60 mg total) by mouth once daily. 90 capsule 1    furosemide (LASIX) 40 MG tablet Take 1 tablet (40 mg total) by mouth 2 (two) times daily. 60 tablet 11    hydrocodone-acetaminophen 10-325mg (NORCO)  mg Tab Take 2 tablets by mouth every 4 (four) hours as needed for Pain.       hydrOXYzine HCl (ATARAX) 25 MG tablet Take 1 tablet (25 mg total) by mouth 3 (three) times daily as needed for Itching. 30 tablet 1    levothyroxine (SYNTHROID) 112 MCG tablet Take 1 tablet (112 mcg total) by mouth before breakfast. 90 tablet 3    methenamine (HIPREX) 1 gram Tab Take 1 tablet (1 g total) by mouth 2 (two) times daily. 60 tablet 11    ondansetron (ZOFRAN) 8 MG tablet TAKE ONE TABLET BY MOUTH EVERY TWELVE HOURS AS NEEDED FOR NAUSEA 60 tablet 2    oxybutynin (DITROPAN XL) 15 MG TR24 Take 1 tablet (15 mg total) by mouth once daily. 30 tablet 6    pantoprazole (PROTONIX) 40 MG tablet Take 1 tablet (40 mg total) by mouth once daily. 90 tablet 3    potassium citrate (UROCIT-K) 5 mEq (540 mg) TbSR Take 1 tablet (5 mEq total) by mouth 3 (three) times daily with meals. 90 tablet 11     "QUEtiapine (SEROQUEL) 25 MG Tab 1 in AM and 2 at bedtime 90 tablet 1    ranitidine (ZANTAC) 150 MG capsule Take 1 capsule (150 mg total) by mouth 2 (two) times daily. 60 capsule 2    SENNOSIDES (SENNA LAX ORAL) Take 20 mg by mouth every evening.       tiZANidine (ZANAFLEX) 4 MG tablet       traZODone (DESYREL) 100 MG tablet Take 2 tablets (200 mg total) by mouth every evening. 120 tablet 4    aspirin (ECOTRIN) 81 MG EC tablet Take 1 tablet (81 mg total) by mouth once daily.  0    [DISCONTINUED] lamotrigine (LAMICTAL) 25 MG tablet Take 1 tablet (25 mg total) by mouth once daily. 30 tablet 6     No current facility-administered medications on file prior to visit.          Objective Findings:    Vital Signs:  BP (!) 100/57   Pulse (!) 57   Ht 5' 4" (1.626 m)   Wt 69.4 kg (153 lb)   LMP  (LMP Unknown)   BMI 26.26 kg/m²   Body mass index is 26.26 kg/m².    Physical Exam:  General Appearance: Well appearing in no acute distress. She is ambulating in a wheelchair  Head:   Normocephalic, without obvious abnormality  Eyes:    No scleral icterus  ENT: Neck supple, Lips, mucosa, and tongue normal  Lungs: CTA bilaterally in anterior and posterior fields, no wheezes, no crackles.  Heart:  Regular rate and rhythm, S1, S2 normal, no murmurs heard  Abdomen: Soft, diffuse abdominal tenderness, non distended with positive bowel sounds in all four quadrants.   Extremities: 1+ edema  Skin: No rash  Neurologic: AAO x 3      Labs:  Lab Results   Component Value Date    WBC 5.07 03/26/2018    HGB 11.9 (L) 03/26/2018    HCT 38.3 03/26/2018    PLT 45 (L) 03/26/2018    CHOL 111 (L) 08/31/2017    TRIG 64 08/31/2017    HDL 42 08/31/2017    ALT 33 03/26/2018    AST 32 03/26/2018     03/26/2018    K 4.8 03/26/2018     03/26/2018    CREATININE 0.8 03/26/2018    BUN 7 (L) 03/26/2018    CO2 25 03/26/2018    TSH 5.063 (H) 08/20/2017    INR 0.9 05/27/2014    HGBA1C 5.4 08/25/2016       Imaging:    Endoscopy:    Last EGD 2014 - " Nissen fundoplication wrap is tight and        moderate resistance to scope passage. No evidence of a failed wrap        endoscopically. A TTS dilator was passed through the scope. Dilation        with a 15-16.5-18 mm balloon (to a maximum balloon size of 18 mm)        dilator was performed    7/2017 EGD Nissen fundoplication wrap is tight and moderate resistance to scope        passage. No evidence of a failed wrap endoscopically. A TTS dilator        was passed through the scope. Dilation with a 15-16.5-18 mm balloon        dilator was performed to 18 mm.       Assessment:  1. Spitting up blood    2. Esophageal dysphagia    3. Nausea and vomiting, intractability of vomiting not specified, unspecified vomiting type       62yo F s/p hiatal hernia repair and fundoplication, presents with dysphagia of solids and liquids x 1 yr, temporary mild relief with esophageal dilations. Now also spitting up blood.     Recommendations:  1. Schedule EGD with dilation of fundoplication of esophagus at endoscopist discretion    2. schedule esophageal manometry to rule out esophageal dysmotility    3. Continue protonix 40mg     Dr Llamas made note that she will need stress test, ugi, ges and motility study prior to surgery. 8/2017 office visit note)    No Follow-up on file.      Order summary:         Thank you so much for allowing me to participate in the care of Tasha Gleason PA-C

## 2018-04-09 DIAGNOSIS — N32.89 BLADDER SPASM: ICD-10-CM

## 2018-04-09 RX ORDER — OXYBUTYNIN CHLORIDE 15 MG/1
TABLET, EXTENDED RELEASE ORAL
Qty: 30 TABLET | Refills: 5 | Status: SHIPPED | OUTPATIENT
Start: 2018-04-09 | End: 2018-05-07 | Stop reason: SINTOL

## 2018-04-09 NOTE — PROGRESS NOTES
CHIEF COMPLAINT:    Mrs. Hawley is a 62 y.o. female presenting for a follow up after cystoscopy Botox injection of the bladder and Macroplastique injection of the urethra on 3/20/2018.    PRESENTING ILLNESS:    Tasha Hawley is a 62 y.o. female who returns stating she is doing very well.  She has no further leakage from the urethra.  She no longer wears a pad.  She is very pleased.        Allergies:  (d)-limonene flavor; Bactrim [sulfamethoxazole-trimethoprim]; Imitrex [sumatriptan succinate]; Penicillins; Topamax [topiramate]; Vancomycin; Butorphanol tartrate; Darvocet a500 [propoxyphene n-acetaminophen]; Fentanyl; White petrolatum-zinc; Zinc oxide-white petrolatum; and Latex, natural rubber    Medications:  Outpatient Encounter Prescriptions as of 4/11/2018   Medication Sig Dispense Refill    aspirin (ECOTRIN) 81 MG EC tablet Take 1 tablet (81 mg total) by mouth once daily.  0    atorvastatin (LIPITOR) 80 MG tablet TAKE ONE TABLET BY MOUTH EVERY DAY. (Patient taking differently: TAKE ONE TABLET BY MOUTH Nightly) 90 tablet 3    DOC-Q-LACE 100 mg capsule TAKE ONE CAPSULE BY MOUTH THREE TIMES A DAY AS NEEDED FOR CONSTIPATION 90 capsule 3    docosanol (ABREVA) 10 % Crea Apply 1 application topically 2 (two) times daily. 2 g 0    FLUoxetine (PROZAC) 20 MG capsule Take 3 capsules (60 mg total) by mouth once daily. 90 capsule 1    furosemide (LASIX) 40 MG tablet Take 1 tablet (40 mg total) by mouth 2 (two) times daily. 60 tablet 11    hydrocodone-acetaminophen 10-325mg (NORCO)  mg Tab Take 2 tablets by mouth every 4 (four) hours as needed for Pain.       hydrOXYzine HCl (ATARAX) 25 MG tablet Take 1 tablet (25 mg total) by mouth 3 (three) times daily as needed for Itching. 30 tablet 1    levothyroxine (SYNTHROID) 112 MCG tablet Take 1 tablet (112 mcg total) by mouth before breakfast. 90 tablet 3    methenamine (HIPREX) 1 gram Tab Take 1 tablet (1 g total) by mouth 2 (two) times daily. 60 tablet 11     ondansetron (ZOFRAN) 8 MG tablet TAKE ONE TABLET BY MOUTH EVERY TWELVE HOURS AS NEEDED FOR NAUSEA 60 tablet 2    oxybutynin (DITROPAN XL) 15 MG TR24 TAKE ONE TABLET BY MOUTH EVERY DAY. 30 tablet 5    pantoprazole (PROTONIX) 40 MG tablet Take 1 tablet (40 mg total) by mouth once daily. 90 tablet 3    potassium citrate (UROCIT-K) 5 mEq (540 mg) TbSR Take 1 tablet (5 mEq total) by mouth 3 (three) times daily with meals. 90 tablet 11    QUEtiapine (SEROQUEL) 25 MG Tab 1 in AM and 2 at bedtime 90 tablet 1    ranitidine (ZANTAC) 150 MG capsule Take 1 capsule (150 mg total) by mouth 2 (two) times daily. 60 capsule 2    SENNOSIDES (SENNA LAX ORAL) Take 20 mg by mouth every evening.       tiZANidine (ZANAFLEX) 4 MG tablet       traZODone (DESYREL) 100 MG tablet Take 2 tablets (200 mg total) by mouth every evening. 120 tablet 4    [DISCONTINUED] lamotrigine (LAMICTAL) 25 MG tablet Take 1 tablet (25 mg total) by mouth once daily. 30 tablet 6    [DISCONTINUED] oxybutynin (DITROPAN XL) 15 MG TR24 Take 1 tablet (15 mg total) by mouth once daily. 30 tablet 6     No facility-administered encounter medications on file as of 4/11/2018.          PHYSICAL EXAMINATION:    The patient generally appears in good health, is appropriately interactive, and is in no apparent distress.    Skin: No lesions.    Mental: Cooperative with normal affect.    Neuro: Grossly intact.    HEENT: Normal. No evidence of lymphadenopathy.    Chest:  normal inspiratory effort.    Abdomen:  Soft, non-tender. No masses or organomegaly. Bladder is not palpable. No evidence of flank discomfort. No evidence of inguinal hernia.    Extremities: No clubbing, cyanosis, or edema      LABS:    Lab Results   Component Value Date    BUN 7 (L) 03/26/2018    CREATININE 0.8 03/26/2018       IMPRESSION:    Encounter Diagnoses   Name Primary?    Post-operative state Yes       PLAN:    1.  Follow up as needed  2.  Urine collection bag and strap given.

## 2018-04-11 ENCOUNTER — OFFICE VISIT (OUTPATIENT)
Dept: UROLOGY | Facility: CLINIC | Age: 63
End: 2018-04-11
Payer: MEDICARE

## 2018-04-11 ENCOUNTER — OFFICE VISIT (OUTPATIENT)
Dept: OPTOMETRY | Facility: CLINIC | Age: 63
End: 2018-04-11
Payer: COMMERCIAL

## 2018-04-11 VITALS
HEIGHT: 64 IN | WEIGHT: 153 LBS | DIASTOLIC BLOOD PRESSURE: 44 MMHG | HEART RATE: 44 BPM | SYSTOLIC BLOOD PRESSURE: 86 MMHG | BODY MASS INDEX: 26.12 KG/M2

## 2018-04-11 DIAGNOSIS — H53.8 BLURRED VISION, BILATERAL: Primary | ICD-10-CM

## 2018-04-11 DIAGNOSIS — H54.3 VISION LOSS, BILATERAL: ICD-10-CM

## 2018-04-11 DIAGNOSIS — Z98.890 POST-OPERATIVE STATE: Primary | ICD-10-CM

## 2018-04-11 DIAGNOSIS — Z96.1 PSEUDOPHAKIA OF BOTH EYES: ICD-10-CM

## 2018-04-11 PROCEDURE — 92014 COMPRE OPH EXAM EST PT 1/>: CPT | Mod: S$GLB,,, | Performed by: OPTOMETRIST

## 2018-04-11 PROCEDURE — 99999 PR PBB SHADOW E&M-EST. PATIENT-LVL II: CPT | Mod: PBBFAC,,, | Performed by: OPTOMETRIST

## 2018-04-11 PROCEDURE — 99999 PR PBB SHADOW E&M-EST. PATIENT-LVL IV: CPT | Mod: PBBFAC,,, | Performed by: UROLOGY

## 2018-04-11 PROCEDURE — 99024 POSTOP FOLLOW-UP VISIT: CPT | Mod: S$GLB,,, | Performed by: UROLOGY

## 2018-04-17 NOTE — PROGRESS NOTES
HPI     DLS: With Dr. Aggarwal 10/13/15    Pt here for annual eye exam;  Pt states vision has been very blurry lately. Pt states she sees diplopia   when she's reading up close and seeing at a distance. Pt denies any   floaters or flashes. Pt states at times she will have a stabbing pain OU.   Pt states she has been having a lot of migraine headaches.     Meds: No GTTS    Last edited by Mily Sanabria on 4/11/2018  3:17 PM. (History)            Assessment /Plan     For exam results, see Encounter Report.    Blurred vision, bilateral    Vision loss, bilateral    Pseudophakia of both eyes      BCVA 20/50 OD, OS in 2015  Worse in both eyes today    Consult Dr. Aggarwal, previous ERG unrelaible         Dry eye   Use artificial tears BID OU/ PRN

## 2018-04-21 ENCOUNTER — OFFICE VISIT (OUTPATIENT)
Dept: INTERNAL MEDICINE | Facility: CLINIC | Age: 63
End: 2018-04-21
Payer: MEDICARE

## 2018-04-21 ENCOUNTER — HOSPITAL ENCOUNTER (OUTPATIENT)
Dept: RADIOLOGY | Facility: HOSPITAL | Age: 63
Discharge: HOME | End: 2018-04-21
Attending: NURSE PRACTITIONER
Payer: MEDICARE

## 2018-04-21 VITALS
WEIGHT: 153 LBS | OXYGEN SATURATION: 98 % | TEMPERATURE: 98 F | SYSTOLIC BLOOD PRESSURE: 88 MMHG | HEART RATE: 57 BPM | HEIGHT: 64 IN | DIASTOLIC BLOOD PRESSURE: 58 MMHG | BODY MASS INDEX: 26.12 KG/M2

## 2018-04-21 DIAGNOSIS — R60.0 LOCALIZED EDEMA: ICD-10-CM

## 2018-04-21 DIAGNOSIS — I89.0 LYMPHEDEMA OF BOTH LOWER EXTREMITIES: Primary | ICD-10-CM

## 2018-04-21 DIAGNOSIS — Z29.9 PROPHYLACTIC MEASURE: ICD-10-CM

## 2018-04-21 DIAGNOSIS — L03.119 CELLULITIS OF LOWER EXTREMITY, UNSPECIFIED LATERALITY: ICD-10-CM

## 2018-04-21 DIAGNOSIS — R82.90 MALODOROUS URINE: ICD-10-CM

## 2018-04-21 DIAGNOSIS — I89.0 LYMPHEDEMA OF BOTH LOWER EXTREMITIES: ICD-10-CM

## 2018-04-21 PROCEDURE — 87077 CULTURE AEROBIC IDENTIFY: CPT

## 2018-04-21 PROCEDURE — 87088 URINE BACTERIA CULTURE: CPT

## 2018-04-21 PROCEDURE — 93970 EXTREMITY STUDY: CPT | Mod: TC

## 2018-04-21 PROCEDURE — 99999 PR PBB SHADOW E&M-EST. PATIENT-LVL V: CPT | Mod: PBBFAC,,, | Performed by: NURSE PRACTITIONER

## 2018-04-21 PROCEDURE — 99214 OFFICE O/P EST MOD 30 MIN: CPT | Mod: S$GLB,,, | Performed by: NURSE PRACTITIONER

## 2018-04-21 PROCEDURE — 87186 SC STD MICRODIL/AGAR DIL: CPT

## 2018-04-21 PROCEDURE — 93970 EXTREMITY STUDY: CPT | Mod: 26,,, | Performed by: RADIOLOGY

## 2018-04-21 PROCEDURE — 99499 UNLISTED E&M SERVICE: CPT | Mod: S$PBB,,, | Performed by: NURSE PRACTITIONER

## 2018-04-21 PROCEDURE — 87086 URINE CULTURE/COLONY COUNT: CPT

## 2018-04-21 RX ORDER — CEPHALEXIN 500 MG/1
500 CAPSULE ORAL EVERY 6 HOURS
Qty: 40 CAPSULE | Refills: 0 | Status: SHIPPED | OUTPATIENT
Start: 2018-04-21 | End: 2018-05-01

## 2018-04-21 NOTE — PROGRESS NOTES
Subjective:       Patient ID: Tasha Hawley is a 62 y.o. female.    Chief Complaint: Leg Pain (Leg Edema)    Ms. Hawley presents today for severe bilateral leg pain. She is a new patient to me and is accompanied by her .  She has lymphedema in both legs, and says the swelling has been worse than normal for the past 2 weeks and the pain has become worse over the past 2 weeks. She has a suprapubic catheter in place and both she and her  complain of severe malodorous urine recently when emptying the collection vessel.       Leg Pain    The pain is present in the left leg and right leg. The quality of the pain is described as aching. The pain is at a severity of 10/10. The pain is severe. The pain has been worsening since onset. Associated symptoms include an inability to bear weight. Pertinent negatives include no loss of motion, loss of sensation, muscle weakness, numbness or tingling. She reports no foreign bodies present. The symptoms are aggravated by palpation and weight bearing. Treatments tried: on prescribed norco.     Review of Systems   Constitutional: Negative for fatigue and fever.   HENT: Negative for facial swelling.    Eyes: Negative for visual disturbance.   Respiratory: Negative for shortness of breath.    Cardiovascular: Negative for chest pain.   Gastrointestinal: Negative for nausea and vomiting.   Genitourinary: Negative for dysuria.   Musculoskeletal: Positive for gait problem and myalgias.   Skin: Positive for color change.   Neurological: Negative for tingling and numbness.   Psychiatric/Behavioral: Negative for confusion.       Objective:      Physical Exam   Constitutional: She is oriented to person, place, and time. No distress.   HENT:   Head: Atraumatic.   Eyes: No scleral icterus.   Cardiovascular: Normal rate and regular rhythm.    Murmur heard.  Pulmonary/Chest: Effort normal and breath sounds normal. No respiratory distress. She has no wheezes. She has no rales.    Abdominal:   Suprapubic catheter in place   Musculoskeletal: She exhibits edema.   Neurological: She is alert and oriented to person, place, and time.   Skin: She is not diaphoretic.   Lower legs are hard, erythematic, some blistering noted to lower left leg. Very tender to even light palpation. Erythema extends from ankles to about 3-4 cm below the knee. She says the redness has crept up her legs over the past week. There is a blister on the sole of her left foot, skin not fully intact.        Assessment:       1. Lymphedema of both lower extremities    2. Cellulitis of lower extremity, unspecified laterality    3. Prophylactic measure    4. Localized edema     5. Malodorous urine        Plan:   1. Lymphedema of both lower extremities  - Hypotensive in clinic today, MAP 68. Unable to increase lasix dosage.   - US Lower Extremity Veins Bilateral; Future    2. Cellulitis of lower extremity, unspecified laterality  - cephALEXin (KEFLEX) 500 MG capsule; Take 1 capsule (500 mg total) by mouth every 6 (six) hours.  Dispense: 40 capsule; Refill: 0    3. Prophylactic measure  - Lactobacillus acidophilus (PROBIOTIC) 10 billion cell Cap; Take 1 capsule by mouth once daily.    4. Localized edema   - US Lower Extremity Veins Bilateral; Future    5. Malodorous urine  - Urine culture      Pt has been given instructions populated from [employer] database and has verbalized understanding of the after visit summary and information contained wherein.    Follow up with a primary care provider. May go to ER for acute shortness of breath, lightheadedness, fever, or any other emergent complaints or changes in condition.

## 2018-04-23 ENCOUNTER — TELEPHONE (OUTPATIENT)
Dept: UROLOGY | Facility: CLINIC | Age: 63
End: 2018-04-23

## 2018-04-23 DIAGNOSIS — R13.19 ESOPHAGEAL DYSPHAGIA: Primary | ICD-10-CM

## 2018-04-23 LAB — BACTERIA UR CULT: NORMAL

## 2018-04-23 RX ORDER — FLUOXETINE HYDROCHLORIDE 20 MG/1
60 CAPSULE ORAL DAILY
Qty: 90 CAPSULE | Refills: 1 | Status: CANCELLED | OUTPATIENT
Start: 2018-04-23

## 2018-04-23 NOTE — TELEPHONE ENCOUNTER
----- Message from Christi Wang LPN sent at 4/12/2018  2:03 PM CDT -----  Contact: Sammi mike/[facility]  [phone]  Need order for sterile water and catheter tip syringes for irrigations please. Thanks  ----- Message -----  From: Kalina Cohn MA  Sent: 4/12/2018  11:15 AM  To: [facility] Staff    States she is calling regarding catheter orders for her.  States her spouse states she is sending orders regarding flushing and changing the catheter.

## 2018-04-23 NOTE — TELEPHONE ENCOUNTER
Please give the order for catheter tip syringe #4 per month.  500 ml sterile saline for irrigation.  If the catheter becomes clogged, OK to first withdraw on the syringe, then irrigate with 60 ml sterile saline for irrigatation.

## 2018-04-28 DIAGNOSIS — K59.00 CONSTIPATION, UNSPECIFIED CONSTIPATION TYPE: Primary | ICD-10-CM

## 2018-04-28 RX ORDER — DOCUSATE SODIUM 100 MG/1
100 CAPSULE, LIQUID FILLED ORAL 3 TIMES DAILY PRN
Qty: 180 CAPSULE | Refills: 3 | Status: SHIPPED | OUTPATIENT
Start: 2018-04-28 | End: 2019-05-28 | Stop reason: SDUPTHER

## 2018-05-03 ENCOUNTER — TELEPHONE (OUTPATIENT)
Dept: INTERNAL MEDICINE | Facility: CLINIC | Age: 63
End: 2018-05-03

## 2018-05-03 NOTE — TELEPHONE ENCOUNTER
----- Message from Vanna Suarez sent at 5/3/2018  7:48 AM CDT -----  Contact: self  Pt called in this morning to schedule a podiatry appt. I noticed that pt has two separate appts scheduled with Dr. Hodges for a 3 month follow up. Pt was uncertain of why there are two appts scheduled, and would like for the nurse to give her a call back to let her know which date she should come for the appt. Please advise.    Pt can be reached at [phone]

## 2018-05-06 ENCOUNTER — ANESTHESIA EVENT (OUTPATIENT)
Dept: ENDOSCOPY | Facility: HOSPITAL | Age: 63
End: 2018-05-06

## 2018-05-06 ENCOUNTER — HOSPITAL ENCOUNTER (EMERGENCY)
Facility: HOSPITAL | Age: 63
Discharge: HOME | End: 2018-05-07
Attending: EMERGENCY MEDICINE
Payer: MEDICARE

## 2018-05-06 DIAGNOSIS — N39.0 COMPLICATED UTI (URINARY TRACT INFECTION): Primary | ICD-10-CM

## 2018-05-06 LAB
BASOPHILS # BLD AUTO: 0.01 K/UL
BASOPHILS NFR BLD: 0.2 %
DIFFERENTIAL METHOD: ABNORMAL
EOSINOPHIL # BLD AUTO: 0.3 K/UL
EOSINOPHIL NFR BLD: 5 %
ERYTHROCYTE [DISTWIDTH] IN BLOOD BY AUTOMATED COUNT: 14 %
HCT VFR BLD AUTO: 34.4 %
HGB BLD-MCNC: 10.3 G/DL
LYMPHOCYTES # BLD AUTO: 1.3 K/UL
LYMPHOCYTES NFR BLD: 26.1 %
MCH RBC QN AUTO: 27.1 PG
MCHC RBC AUTO-ENTMCNC: 29.9 G/DL
MCV RBC AUTO: 91 FL
MONOCYTES # BLD AUTO: 0.6 K/UL
MONOCYTES NFR BLD: 12 %
NEUTROPHILS # BLD AUTO: 2.8 K/UL
NEUTROPHILS NFR BLD: 56.7 %
PLATELET # BLD AUTO: 231 K/UL
PMV BLD AUTO: 9.6 FL
RBC # BLD AUTO: 3.8 M/UL
WBC # BLD AUTO: 5.01 K/UL

## 2018-05-06 PROCEDURE — 85025 COMPLETE CBC W/AUTO DIFF WBC: CPT

## 2018-05-06 PROCEDURE — 80053 COMPREHEN METABOLIC PANEL: CPT

## 2018-05-06 PROCEDURE — 96361 HYDRATE IV INFUSION ADD-ON: CPT

## 2018-05-06 PROCEDURE — 96374 THER/PROPH/DIAG INJ IV PUSH: CPT

## 2018-05-06 PROCEDURE — 99284 EMERGENCY DEPT VISIT MOD MDM: CPT | Mod: 25

## 2018-05-06 PROCEDURE — 25000003 PHARM REV CODE 250: Performed by: EMERGENCY MEDICINE

## 2018-05-06 PROCEDURE — 63600175 PHARM REV CODE 636 W HCPCS: Performed by: EMERGENCY MEDICINE

## 2018-05-06 RX ORDER — MORPHINE SULFATE 4 MG/ML
10 INJECTION, SOLUTION INTRAMUSCULAR; INTRAVENOUS
Status: COMPLETED | OUTPATIENT
Start: 2018-05-06 | End: 2018-05-06

## 2018-05-06 RX ORDER — MORPHINE SULFATE 4 MG/ML
4 INJECTION, SOLUTION INTRAMUSCULAR; INTRAVENOUS
Status: DISCONTINUED | OUTPATIENT
Start: 2018-05-06 | End: 2018-05-06

## 2018-05-06 RX ADMIN — MORPHINE SULFATE 10 MG: 4 INJECTION INTRAVENOUS at 11:05

## 2018-05-06 RX ADMIN — SODIUM CHLORIDE 1000 ML: 0.9 INJECTION, SOLUTION INTRAVENOUS at 11:05

## 2018-05-07 ENCOUNTER — TELEPHONE (OUTPATIENT)
Dept: UROLOGY | Facility: CLINIC | Age: 63
End: 2018-05-07

## 2018-05-07 ENCOUNTER — ANESTHESIA (OUTPATIENT)
Dept: ENDOSCOPY | Facility: HOSPITAL | Age: 63
End: 2018-05-07

## 2018-05-07 ENCOUNTER — OFFICE VISIT (OUTPATIENT)
Dept: UROLOGY | Facility: CLINIC | Age: 63
End: 2018-05-07
Payer: MEDICARE

## 2018-05-07 VITALS
OXYGEN SATURATION: 96 % | WEIGHT: 171 LBS | SYSTOLIC BLOOD PRESSURE: 132 MMHG | HEIGHT: 64 IN | BODY MASS INDEX: 29.19 KG/M2 | DIASTOLIC BLOOD PRESSURE: 65 MMHG | HEART RATE: 60 BPM | DIASTOLIC BLOOD PRESSURE: 49 MMHG | HEART RATE: 68 BPM | RESPIRATION RATE: 18 BRPM | TEMPERATURE: 98 F | SYSTOLIC BLOOD PRESSURE: 104 MMHG

## 2018-05-07 DIAGNOSIS — N31.9 NEUROGENIC BLADDER: Chronic | ICD-10-CM

## 2018-05-07 DIAGNOSIS — R10.9 ABDOMINAL PAIN, UNSPECIFIED ABDOMINAL LOCATION: Primary | ICD-10-CM

## 2018-05-07 DIAGNOSIS — R33.9 URINARY RETENTION: Chronic | ICD-10-CM

## 2018-05-07 DIAGNOSIS — K59.09 CHRONIC CONSTIPATION: ICD-10-CM

## 2018-05-07 LAB
ALBUMIN SERPL BCP-MCNC: 3.8 G/DL
ALP SERPL-CCNC: 110 U/L
ALT SERPL W/O P-5'-P-CCNC: 8 U/L
ANION GAP SERPL CALC-SCNC: 8 MMOL/L
AST SERPL-CCNC: 14 U/L
BACTERIA #/AREA URNS HPF: ABNORMAL /HPF
BILIRUB SERPL-MCNC: 0.4 MG/DL
BILIRUB UR QL STRIP: NEGATIVE
BUN SERPL-MCNC: 12 MG/DL
CALCIUM SERPL-MCNC: 9.5 MG/DL
CHLORIDE SERPL-SCNC: 99 MMOL/L
CLARITY UR: ABNORMAL
CO2 SERPL-SCNC: 33 MMOL/L
COLOR UR: YELLOW
CREAT SERPL-MCNC: 1 MG/DL
EST. GFR  (AFRICAN AMERICAN): >60 ML/MIN/1.73 M^2
EST. GFR  (NON AFRICAN AMERICAN): >60 ML/MIN/1.73 M^2
GLUCOSE SERPL-MCNC: 85 MG/DL
GLUCOSE UR QL STRIP: NEGATIVE
HGB UR QL STRIP: ABNORMAL
HYALINE CASTS #/AREA URNS LPF: 1 /LPF
KETONES UR QL STRIP: NEGATIVE
LEUKOCYTE ESTERASE UR QL STRIP: ABNORMAL
MICROSCOPIC COMMENT: ABNORMAL
NITRITE UR QL STRIP: NEGATIVE
PH UR STRIP: 6 [PH] (ref 5–8)
POTASSIUM SERPL-SCNC: 4.2 MMOL/L
PROT SERPL-MCNC: 7.3 G/DL
PROT UR QL STRIP: ABNORMAL
RBC #/AREA URNS HPF: 20 /HPF (ref 0–4)
SODIUM SERPL-SCNC: 140 MMOL/L
SP GR UR STRIP: 1.01 (ref 1–1.03)
URN SPEC COLLECT METH UR: ABNORMAL
UROBILINOGEN UR STRIP-ACNC: NEGATIVE EU/DL
WBC #/AREA URNS HPF: 40 /HPF (ref 0–5)
YEAST URNS QL MICRO: ABNORMAL

## 2018-05-07 PROCEDURE — 99999 PR PBB SHADOW E&M-EST. PATIENT-LVL III: CPT | Mod: PBBFAC,,, | Performed by: UROLOGY

## 2018-05-07 PROCEDURE — 99024 POSTOP FOLLOW-UP VISIT: CPT | Mod: S$GLB,,, | Performed by: UROLOGY

## 2018-05-07 PROCEDURE — 81000 URINALYSIS NONAUTO W/SCOPE: CPT

## 2018-05-07 PROCEDURE — 25000003 PHARM REV CODE 250: Performed by: EMERGENCY MEDICINE

## 2018-05-07 RX ORDER — QUETIAPINE FUMARATE 100 MG/1
TABLET, FILM COATED ORAL
COMMUNITY
Start: 2018-04-28 | End: 2018-07-18 | Stop reason: SDUPTHER

## 2018-05-07 RX ORDER — OXYBUTYNIN CHLORIDE 5 MG/1
5 TABLET ORAL
Status: COMPLETED | OUTPATIENT
Start: 2018-05-07 | End: 2018-05-07

## 2018-05-07 RX ORDER — MOXIFLOXACIN HYDROCHLORIDE 400 MG/1
400 TABLET ORAL
Status: COMPLETED | OUTPATIENT
Start: 2018-05-07 | End: 2018-05-07

## 2018-05-07 RX ORDER — METHENAMINE MANDELATE 500 MG/1
TABLET ORAL
COMMUNITY
Start: 2018-05-02 | End: 2018-05-17

## 2018-05-07 RX ORDER — LEVOFLOXACIN 750 MG/1
750 TABLET ORAL DAILY
Qty: 7 TABLET | Refills: 0 | Status: SHIPPED | OUTPATIENT
Start: 2018-05-07 | End: 2018-05-14

## 2018-05-07 RX ADMIN — MOXIFLOXACIN HYDROCHLORIDE 400 MG: 400 TABLET, FILM COATED ORAL at 02:05

## 2018-05-07 RX ADMIN — OXYBUTYNIN CHLORIDE 5 MG: 5 TABLET ORAL at 02:05

## 2018-05-07 NOTE — ED NOTES
Pt discharged to home per whch with spouse with rx x 1 and dressed and meadows cath patent to  bag

## 2018-05-07 NOTE — TELEPHONE ENCOUNTER
----- Message from Lisa Mcallisterkert sent at 5/7/2018  8:09 AM CDT -----  Contact: self - [phone]  togami - severe stomach pain - legs are large - has uti - went to er -  please call patient at [phone]

## 2018-05-07 NOTE — DISCHARGE INSTRUCTIONS
Thank you for choosing [facility]! We appreciate you coming to us for your medical care. We hope you feel better soon! Please come back to [facility] for all of your future medical needs.      Sincerely,    Maurice Fabian MD  Medical Director  Emergency Department

## 2018-05-07 NOTE — ED NOTES
Pt sitting on side bed and dressed and meadows cath maintained to gravity drainage and patent; no change with bladder pain

## 2018-05-07 NOTE — ED NOTES
Called spouse to come  pt and reviewed meds to be given and pt cleared for discharge to home; meadows cath patent and urine color clearing and sl cloudy

## 2018-05-07 NOTE — ED NOTES
RX x1 given with discharge home care and follow up care instructions; pt in whch for discharge in room and awaiting spouse to pick her up

## 2018-05-07 NOTE — ED PROVIDER NOTES
Encounter Date: 5/6/2018    SCRIBE #1 NOTE: I, James Rob, am scribing for, and in the presence of,  Dr. Fabian. I have scribed the entire note.       History     Chief Complaint   Patient presents with    Flank Pain     Patient presents to the ED with reports of having right sided flank pain with hematuria.      This is a 62 y.o. female who has a past medical history of Anticoagulant long-term use; Anxiety; Arthritis; Bilateral lower extremity edema; Carotid artery occlusion; Cataract; Depression; Fever blister; Hypothyroid; Iron deficiency anemia; Lumbar radiculopathy; Ocular migraine; and Sleep apnea.   The patient presents to the Emergency Department with diffuse abdominal pain and right-sided flank pain today.  Symptoms are associated with frequency and hematuria via abdominal catheter. She reports feeling nauseated after eating.  Pt denies fever, chills, diarrhea, or any other concerning symptoms.   Her abdominal pain is worsened by movement or eating. Patient has no prior history of kidney stones.   Pt has a past surgical history that includes Hysterectomy (1975); Back surgery; pain pump placement; Spine surgery (5-13-13); Cataract extraction w/  intraocular lens implant (Left); and Spinal cord stimulator removal.      The history is provided by the patient.     Review of patient's allergies indicates:   Allergen Reactions    (d)-limonene flavor      Other reaction(s): difficult intubation  Other reaction(s): Difficulty breathing    Bactrim [sulfamethoxazole-trimethoprim] Anaphylaxis    Imitrex [sumatriptan succinate] Shortness Of Breath    Penicillins Shortness Of Breath     Other reaction(s): Jittery    Topamax [topiramate] Shortness Of Breath    Vancomycin Shortness Of Breath     Rash    Butorphanol tartrate     Darvocet a500 [propoxyphene n-acetaminophen]      Other reaction(s): Difficulty breathing    Fentanyl      Other reaction(s): Vomiting  Other reaction(s): Nausea  Other reaction(s):  Itching  swelling    White petrolatum-zinc     Zinc oxide-white petrolatum      Other reaction(s): Difficulty breathing    Latex, natural rubber Itching and Rash     Past Medical History:   Diagnosis Date    Anticoagulant long-term use     Anxiety     Arthritis     Bilateral lower extremity edema     severe chronic    Carotid artery occlusion     Cataract     Depression     Fever blister     Hypothyroid     Iron deficiency anemia     Lumbar radiculopathy     with chronic pain    Ocular migraine     Sleep apnea     cpap     Past Surgical History:   Procedure Laterality Date    BACK SURGERY      x 12    CATARACT EXTRACTION W/  INTRAOCULAR LENS IMPLANT Left     Dr Coleman     HYSTERECTOMY  1975    endometriosis    pain pump placement      SQ Dilaudid Pump managed by Dr. Hillman, Pain Management    SPINAL CORD STIMULATOR REMOVAL      before [place]    SPINE SURGERY  5-13-13    CERVICAL FUSION     Family History   Problem Relation Age of Onset    Cancer Mother 55        breast    Cancer Father         esophagus,had laryngectomy    Esophageal cancer Father     Parkinsonism Maternal Grandmother     Tremor Maternal Grandmother     No Known Problems Brother     No Known Problems Brother     Heart disease Maternal Uncle     No Known Problems Sister     No Known Problems Maternal Aunt     No Known Problems Paternal Aunt     No Known Problems Paternal Uncle     No Known Problems Maternal Grandfather     No Known Problems Paternal Grandmother     No Known Problems Paternal Grandfather     Melanoma Neg Hx     Amblyopia Neg Hx     Blindness Neg Hx     Cataracts Neg Hx     Diabetes Neg Hx     Glaucoma Neg Hx     Hypertension Neg Hx     Macular degeneration Neg Hx     Retinal detachment Neg Hx     Strabismus Neg Hx     Stroke Neg Hx     Thyroid disease Neg Hx     Colon cancer Neg Hx      Social History   Substance Use Topics    Smoking status: Never Smoker    Smokeless tobacco:  Never Used    Alcohol use No      Comment: denies     Review of Systems   Constitutional: Negative for chills and fever.   HENT: Negative for facial swelling and trouble swallowing.    Eyes: Negative for redness.   Respiratory: Negative for shortness of breath.    Cardiovascular: Negative for chest pain.   Gastrointestinal: Positive for abdominal pain. Negative for diarrhea and vomiting.   Genitourinary: Positive for flank pain, frequency and hematuria. Negative for dysuria.   Musculoskeletal: Negative for gait problem.   Skin: Negative for rash.   Neurological: Negative for facial asymmetry and speech difficulty.       Physical Exam     Initial Vitals [05/06/18 2250]   BP Pulse Resp Temp SpO2   (!) 100/49 64 18 98.2 °F (36.8 °C) 100 %      MAP       66         Physical Exam    Nursing note and vitals reviewed.  Constitutional: She appears well-developed and well-nourished. She is not diaphoretic. No distress.   HENT:   Head: Normocephalic and atraumatic.   Mouth/Throat: Oropharynx is clear and moist.   Eyes: Conjunctivae are normal. Pupils are equal, round, and reactive to light.   Neck: Normal range of motion. Neck supple.   Cardiovascular: Normal rate, regular rhythm, normal heart sounds and intact distal pulses.   Pulmonary/Chest: No respiratory distress.   Abdominal: Soft. Bowel sounds are normal. She exhibits no distension. There is tenderness. There is guarding (voluntary).   Diffuse abdominal TTP  Suprapubic catheter clean, dry, intact  Urine in meadows bag is dark orange   Musculoskeletal: Normal range of motion. She exhibits no edema or tenderness.   Neurological: She is alert and oriented to person, place, and time. She has normal strength.   Skin: Skin is warm and dry.   Psychiatric: She has a normal mood and affect. Thought content normal.         ED Course   Procedures  Labs Reviewed   CBC W/ AUTO DIFFERENTIAL - Abnormal; Notable for the following:        Result Value    RBC 3.80 (*)     Hemoglobin 10.3  (*)     Hematocrit 34.4 (*)     MCHC 29.9 (*)     All other components within normal limits   COMPREHENSIVE METABOLIC PANEL - Abnormal; Notable for the following:     CO2 33 (*)     ALT 8 (*)     All other components within normal limits   URINALYSIS - Abnormal; Notable for the following:     Appearance, UA Cloudy (*)     Protein, UA 1+ (*)     Occult Blood UA 3+ (*)     Leukocytes, UA 3+ (*)     All other components within normal limits   URINALYSIS MICROSCOPIC - Abnormal; Notable for the following:     RBC, UA 20 (*)     WBC, UA 40 (*)     Bacteria, UA Moderate (*)     Yeast, UA Many (*)     All other components within normal limits   URINALYSIS          X-Rays:   Independently Interpreted Readings:   Other Readings:  Reviewed by myself, read by radiology.    Imaging Results          CT Renal Stone Study ABD Pelvis WO (Final result)  Result time 05/07/18 01:14:24    Final result by Myles Palumbo MD (05/07/18 01:14:24)                 Impression:      1.  No CT evidence of acute intra-abdominal abnormality.    2.  Moderate volume of stool throughout the colon which may relate to constipation.    3.  Suprapubic catheter with relatively stable chronic bladder wall thickening.  Mild soft tissue induration is noted along with subcutaneous course of the suprapubic catheter, similar to prior examination.    4.  Scattered mildly prominent bilateral inguinal lymph nodes which may be reactive in etiology.  Clinical correlation is advised.    5.  Moderate size hiatal hernia.    6.  Additional incidental findings as above.      Electronically signed by: Myles Palumbo MD  Date:    05/07/2018  Time:    01:14             Narrative:    EXAMINATION:  CT RENAL STONE STUDY ABD PELVIS WO    CLINICAL HISTORY:  Flank pain, stone disease suspected;    TECHNIQUE:  Low dose axial images, sagittal and coronal reformations were obtained from the lung bases to the pubic symphysis.  Contrast was not administered.    COMPARISON:  CT  01/30/2018    FINDINGS:  There is bibasilar atelectasis.  There is no significant pleural effusion.  The visualized portions of the heart appear normal.    The liver is unremarkable in appearance on this limited non-contrast examination.  The gallbladder shows no evidence of stones or pericholecystic fluid.  There is no intra-or extrahepatic biliary ductal dilatation.    There is a moderate-sized hiatal hernia.  The spleen, pancreas, and adrenal glands appear within normal limits for noncontrast technique.    The kidneys are normal in size and location. There is no evidence of hydronephrosis or nephrolithiasis.  There is a suprapubic bladder catheter in place.  Urinary bladder is nondistended with chronic circumferential wall thickening, similar to prior examination.  There is relatively stable appearing mild soft tissue induration along the subcutaneous course of the suprapubic catheter.  No significant free fluid within the pelvis.  The uterus is surgically absent.    The abdominal aorta is normal in course and caliber without significant atherosclerotic calcifications.The visualized loops of small and large bowel show no evidence of obstruction or inflammation.  There is a moderate volume of stool throughout the colon suggesting constipation.  The appendix appears within normal limits.  There is no ascites or free intraperitoneal air.    There are stable postoperative findings of prior lumbosacral posterior instrumented fusion.  Retained right L5 pedicle screws again noted.  There is a spinal stimulator device in place.  There is mild body wall edema.  There are scattered mildly enlarged bilateral inguinal lymph nodes.                               US Abdomen Limited (Final result)  Result time 05/07/18 01:03:41    Final result by Myles Palumbo MD (05/07/18 01:03:41)                 Impression:      No significant sonographic abnormality.      Electronically signed by: Myles Palumbo  MD  Date:    05/07/2018  Time:    01:03             Narrative:    EXAMINATION:  US ABDOMEN LIMITED    CLINICAL HISTORY:  RUQ pain;    TECHNIQUE:  Limited ultrasound of the right upper quadrant of the abdomen (including pancreas, liver, gallbladder, common bile duct, and right kidney) was performed.    COMPARISON:  CT 01/30/2018    FINDINGS:  Liver: Normal in size, measuring 16.0 cm. Homogeneous echotexture. No focal hepatic lesions.    Gallbladder: No calculi, wall thickening, or pericholecystic fluid.  No sonographic Mckeon's sign.    Biliary system: The common duct is not dilated, measuring 2 mm.  No intrahepatic ductal dilatation.    Pancreas: Obscured by bowel gas.    Right kidney: Normal in size with no hydronephrosis, measuring 10.5 cm.    Miscellaneous: No upper abdominal ascites.                              Medical Decision Making:   Initial Assessment:   This is a 62 y.o. Female with a suprapubic catheter who presents with diffuse abdominal and right flank pain.  Differential Diagnosis:   Cystitis, pyelonephritis, renal colic, gallstones, colitis, intraabdominal infection, NOS  Clinical Tests:   Lab Tests: Ordered and Reviewed  Radiological Study: Ordered and Reviewed  ED Management:  Right upper quadrant ultrasound, CT abdomen/pelvis, basic labs, UA.  We will check for a replacement for her Motley.      0154  Nursing informs me that there is no replacement for her Motley in the hospital.    CT and US were negative; urine shows positve infection. Will treat in the the ED and discharge pt home.     Clinical impression and plan discussed with patient.    Pt is to call for follow up with PCP in 7 days.  Pt to return to the ED for any new or concerning symptoms.  Aftercare instructions and return precautions provided to patient.   Pt expressed understanding and agrees with plan.                      Clinical Impression:     1. Complicated UTI (urinary tract infection)            I, Dr. Maurice Fabian, personally  performed the services described in this documentation.   All medical record entries made by the scribe were at my direction and in my presence.   I have reviewed the chart and agree that the record is accurate and complete.   Maurice Fabian MD.                  Maurice Fabian MD  05/07/18 7386

## 2018-05-07 NOTE — PROGRESS NOTES
CHIEF COMPLAINT:    Mrs. Hawley is a 62 y.o. female presenting for an urgent appointment for abdominal pain     PRESENTING ILLNESS:    Tasha Hawley is a 62 y.o. female called crying because she had been to the ER this am for right flank and abdominal pain.  CT scan showed no stones, no perinephric stranding.  She has no fevers or chills.  She states she leaked around the catheter. It was just changed last week.  She and her  commented on the fact that it did not drain right away and they were anxious but the CT scan images were reviewed and demonstrated that it is in proper position.  The patient had a copious amount of stool throughout the colon.  She has had 3 bowel movements today, her  remarks that when she finally has a bm, sometimes it stops up the toilet.  She also complained about her lower extremity edema.      REVIEW OF SYSTEMS:    Review of Systems   Constitutional: Negative.    HENT: Negative.    Eyes: Negative.    Respiratory: Negative.    Cardiovascular: Positive for leg swelling.   Gastrointestinal: Positive for abdominal pain and constipation.   Musculoskeletal: Positive for back pain.   Skin: Negative.    Neurological: Negative.    Endo/Heme/Allergies: Negative.    Psychiatric/Behavioral: The patient is nervous/anxious.      PATIENT HISTORY:    Past Medical History:   Diagnosis Date    Anticoagulant long-term use     Anxiety     Arthritis     Bilateral lower extremity edema     severe chronic    Carotid artery occlusion     Cataract     Depression     Fever blister     Hypothyroid     Iron deficiency anemia     Lumbar radiculopathy     with chronic pain    Ocular migraine     Sleep apnea     cpap       Past Surgical History:   Procedure Laterality Date    BACK SURGERY      x 12    CATARACT EXTRACTION W/  INTRAOCULAR LENS IMPLANT Left     Dr Coleman     HYSTERECTOMY  1975    endometriosis    pain pump placement      SQ Dilaudid Pump managed by Dr. Hillman,  Pain Management    SPINAL CORD STIMULATOR REMOVAL      before [place]    SPINE SURGERY  5-13-13    CERVICAL FUSION       Family History   Problem Relation Age of Onset    Cancer Mother 55        breast    Cancer Father         esophagus,had laryngectomy    Esophageal cancer Father     Parkinsonism Maternal Grandmother     Tremor Maternal Grandmother      Social History    Marital status:      Social History Main Topics    Smoking status: Never Smoker    Smokeless tobacco: Never Used    Alcohol use No      Comment: denies    Drug use: No    Sexual activity: No     Allergies:  (d)-limonene flavor; Bactrim [sulfamethoxazole-trimethoprim]; Imitrex [sumatriptan succinate]; Penicillins; Topamax [topiramate]; Vancomycin; Butorphanol tartrate; Darvocet a500 [propoxyphene n-acetaminophen]; Fentanyl; White petrolatum-zinc; Zinc oxide-white petrolatum; and Latex, natural rubber    Medications:  Outpatient Encounter Prescriptions as of 5/7/2018   Medication Sig Dispense Refill    aspirin (ECOTRIN) 81 MG EC tablet Take 1 tablet (81 mg total) by mouth once daily.  0    atorvastatin (LIPITOR) 80 MG tablet TAKE ONE TABLET BY MOUTH EVERY DAY. (Patient taking differently: TAKE ONE TABLET BY MOUTH Nightly) 90 tablet 3    DOC-Q-LACE 100 mg capsule TAKE ONE CAPSULE BY MOUTH THREE TIMES DAILY AS NEEDED FOR CONSTIPATION 180 capsule 3    docosanol (ABREVA) 10 % Crea Apply 1 application topically 2 (two) times daily. 2 g 0    FLUoxetine (PROZAC) 20 MG capsule Take 3 capsules (60 mg total) by mouth once daily. 90 capsule 1    furosemide (LASIX) 40 MG tablet Take 1 tablet (40 mg total) by mouth 2 (two) times daily. 60 tablet 11    hydrocodone-acetaminophen 10-325mg (NORCO)  mg Tab Take 2 tablets by mouth every 4 (four) hours as needed for Pain.       hydrOXYzine HCl (ATARAX) 25 MG tablet Take 1 tablet (25 mg total) by mouth 3 (three) times daily as needed for Itching. 30 tablet 1    Lactobacillus acidophilus  (PROBIOTIC) 10 billion cell Cap Take 1 capsule by mouth once daily.      levoFLOXacin (LEVAQUIN) 750 MG tablet Take 1 tablet (750 mg total) by mouth once daily. 7 tablet 0    levothyroxine (SYNTHROID) 112 MCG tablet Take 1 tablet (112 mcg total) by mouth before breakfast. 90 tablet 3    methenamine (HIPREX) 1 gram Tab Take 1 tablet (1 g total) by mouth 2 (two) times daily. 60 tablet 11    methenamine mandelate (MANDELAMINE) 0.5 g tablet       ondansetron (ZOFRAN) 8 MG tablet TAKE ONE TABLET BY MOUTH EVERY TWELVE HOURS AS NEEDED FOR NAUSEA 60 tablet 2    pantoprazole (PROTONIX) 40 MG tablet Take 1 tablet (40 mg total) by mouth once daily. 90 tablet 3    potassium citrate (UROCIT-K) 5 mEq (540 mg) TbSR Take 1 tablet (5 mEq total) by mouth 3 (three) times daily with meals. 90 tablet 11    QUEtiapine (SEROQUEL) 100 MG Tab       QUEtiapine (SEROQUEL) 25 MG Tab 1 in AM and 2 at bedtime 90 tablet 1    ranitidine (ZANTAC) 150 MG capsule Take 1 capsule (150 mg total) by mouth 2 (two) times daily. 60 capsule 2    SENNOSIDES (SENNA LAX ORAL) Take 20 mg by mouth every evening.       tiZANidine (ZANAFLEX) 4 MG tablet       traZODone (DESYREL) 100 MG tablet Take 2 tablets (200 mg total) by mouth every evening. 120 tablet 4       PHYSICAL EXAMINATION:    The patient generally appears in good health, is appropriately interactive, and is in no apparent distress.    Skin: No lesions.    Mental: Cooperative with normal affect.    Neuro: Grossly intact.    HEENT: Normal. No evidence of lymphadenopathy.    Chest:  normal inspiratory effort.    Abdomen:  Soft, non-tender. No masses or organomegaly. Bladder is not palpable. No evidence of flank discomfort. No evidence of inguinal hernia.    Extremities: significantly edematous, to the thigh.  There is brawny edema bilaterally    Urine clear in the tube.  She had moxifloxacin x 1 dose in the ER     LABS:    Lab Results   Component Value Date    BUN 12 05/06/2018    CREATININE  1.0 05/06/2018     CT scan from yesterday was reviewed both images and the report    IMPRESSION:    Encounter Diagnoses   Name Primary?    Abdominal pain, unspecified abdominal location Yes    Chronic constipation     Neurogenic bladder     Urinary retention        PLAN:    1.  Discontinue the oxybutynin since she had the Botox injection and urethral bulking agent  2.  She complained about ongoing issues with lower extremity edema, she should address this with her primary  3.  Continue monthly catheter changes.   4.  Recommended she see her primary for the LE edema.

## 2018-05-08 ENCOUNTER — TELEPHONE (OUTPATIENT)
Dept: INTERNAL MEDICINE | Facility: CLINIC | Age: 63
End: 2018-05-08

## 2018-05-08 NOTE — TELEPHONE ENCOUNTER
Patient states she is still having a lot of swelling and blisters on her legs. Wants to know what the next steps would be? Wants to know if she should she vasular?

## 2018-05-08 NOTE — TELEPHONE ENCOUNTER
----- Message from Rei Ryan sent at 5/8/2018  2:55 PM CDT -----  Contact: self [phone]  Patient would like to get a referral.  Does the patient already have the specialty clinic appointment scheduled:    If yes, what date is the appointment scheduled:   no  Referral to what specialty:  Vascular   Reason (be specific):  Both legs swollen with blister's, hurt to touch    Does the patient want the referral with a specific physician:    [facility]  Comments:  Pt would like a call back from the nurse

## 2018-05-09 NOTE — TELEPHONE ENCOUNTER
Please call her.  She needs to come see me.  See if you can squeeze her into clinic Thu or Fri.  According to chart review, this has been going on for a while, and Ms Brandon evaluated it last week.    D

## 2018-05-09 NOTE — TELEPHONE ENCOUNTER
Spoke to patient she states she is leaving today to go out of town and requested appointment for next week. Appointment scheduled for Thursday. I did speak to her about the importance of her coming tomorrow patient states she can't. Just an FYI

## 2018-05-14 RX ORDER — POTASSIUM CHLORIDE 20 MEQ/1
TABLET, EXTENDED RELEASE ORAL
Qty: 270 TABLET | Refills: 3 | Status: SHIPPED | OUTPATIENT
Start: 2018-05-14 | End: 2018-09-14

## 2018-05-15 ENCOUNTER — OFFICE VISIT (OUTPATIENT)
Dept: PODIATRY | Facility: CLINIC | Age: 63
End: 2018-05-15
Payer: MEDICARE

## 2018-05-15 VITALS — DIASTOLIC BLOOD PRESSURE: 45 MMHG | SYSTOLIC BLOOD PRESSURE: 91 MMHG | HEART RATE: 56 BPM | HEIGHT: 64 IN

## 2018-05-15 DIAGNOSIS — L60.9 DISEASE OF NAIL: Primary | ICD-10-CM

## 2018-05-15 DIAGNOSIS — I73.9 PVD (PERIPHERAL VASCULAR DISEASE): ICD-10-CM

## 2018-05-15 DIAGNOSIS — R60.9 SWELLING: ICD-10-CM

## 2018-05-15 PROCEDURE — 11721 DEBRIDE NAIL 6 OR MORE: CPT | Mod: S$GLB,,, | Performed by: PODIATRIST

## 2018-05-15 PROCEDURE — 99999 PR PBB SHADOW E&M-EST. PATIENT-LVL III: CPT | Mod: PBBFAC,,, | Performed by: PODIATRIST

## 2018-05-15 PROCEDURE — 99499 UNLISTED E&M SERVICE: CPT | Mod: S$GLB,,, | Performed by: PODIATRIST

## 2018-05-15 PROCEDURE — 99213 OFFICE O/P EST LOW 20 MIN: CPT | Mod: 25,S$GLB,, | Performed by: PODIATRIST

## 2018-05-17 ENCOUNTER — OFFICE VISIT (OUTPATIENT)
Dept: OPHTHALMOLOGY | Facility: CLINIC | Age: 63
End: 2018-05-17
Payer: MEDICARE

## 2018-05-17 ENCOUNTER — OFFICE VISIT (OUTPATIENT)
Dept: INTERNAL MEDICINE | Facility: CLINIC | Age: 63
End: 2018-05-17
Payer: MEDICARE

## 2018-05-17 ENCOUNTER — LAB VISIT (OUTPATIENT)
Dept: LAB | Facility: HOSPITAL | Age: 63
End: 2018-05-17
Attending: INTERNAL MEDICINE
Payer: MEDICARE

## 2018-05-17 VITALS
DIASTOLIC BLOOD PRESSURE: 42 MMHG | HEIGHT: 64 IN | WEIGHT: 153 LBS | HEART RATE: 48 BPM | BODY MASS INDEX: 26.12 KG/M2 | SYSTOLIC BLOOD PRESSURE: 96 MMHG

## 2018-05-17 DIAGNOSIS — D64.9 ANEMIA, UNSPECIFIED TYPE: ICD-10-CM

## 2018-05-17 DIAGNOSIS — R20.2 PARESTHESIA OF BOTH LOWER EXTREMITIES: ICD-10-CM

## 2018-05-17 DIAGNOSIS — E03.9 PRIMARY HYPOTHYROIDISM: Chronic | ICD-10-CM

## 2018-05-17 DIAGNOSIS — E03.9 PRIMARY HYPOTHYROIDISM: Primary | Chronic | ICD-10-CM

## 2018-05-17 DIAGNOSIS — F11.20 NARCOTIC DEPENDENCY, CONTINUOUS: Chronic | ICD-10-CM

## 2018-05-17 DIAGNOSIS — I89.0 LYMPHEDEMA OF BOTH LOWER EXTREMITIES: Chronic | ICD-10-CM

## 2018-05-17 DIAGNOSIS — H04.123 INSUFFICIENCY OF TEAR FILM OF BOTH EYES: ICD-10-CM

## 2018-05-17 DIAGNOSIS — K21.9 GASTROESOPHAGEAL REFLUX DISEASE, ESOPHAGITIS PRESENCE NOT SPECIFIED: Chronic | ICD-10-CM

## 2018-05-17 DIAGNOSIS — Z23 NEED FOR VACCINATION AGAINST STREPTOCOCCUS PNEUMONIAE: ICD-10-CM

## 2018-05-17 DIAGNOSIS — F41.1 GENERALIZED ANXIETY DISORDER: ICD-10-CM

## 2018-05-17 DIAGNOSIS — N31.9 NEUROGENIC BLADDER: Chronic | ICD-10-CM

## 2018-05-17 PROBLEM — R13.10 DYSPHAGIA: Status: RESOLVED | Noted: 2017-07-21 | Resolved: 2018-05-17

## 2018-05-17 LAB
ALBUMIN SERPL BCP-MCNC: 3.2 G/DL
ALP SERPL-CCNC: 101 U/L
ALT SERPL W/O P-5'-P-CCNC: 7 U/L
ANION GAP SERPL CALC-SCNC: 7 MMOL/L
AST SERPL-CCNC: 10 U/L
BASOPHILS # BLD AUTO: 0.01 K/UL
BASOPHILS NFR BLD: 0.2 %
BILIRUB SERPL-MCNC: 0.4 MG/DL
BUN SERPL-MCNC: 7 MG/DL
CALCIUM SERPL-MCNC: 8.9 MG/DL
CHLORIDE SERPL-SCNC: 105 MMOL/L
CO2 SERPL-SCNC: 30 MMOL/L
CREAT SERPL-MCNC: 0.7 MG/DL
DIFFERENTIAL METHOD: ABNORMAL
EOSINOPHIL # BLD AUTO: 0.2 K/UL
EOSINOPHIL NFR BLD: 4.8 %
ERYTHROCYTE [DISTWIDTH] IN BLOOD BY AUTOMATED COUNT: 13.8 %
EST. GFR  (AFRICAN AMERICAN): >60 ML/MIN/1.73 M^2
EST. GFR  (NON AFRICAN AMERICAN): >60 ML/MIN/1.73 M^2
GLUCOSE SERPL-MCNC: 81 MG/DL
HCT VFR BLD AUTO: 31.8 %
HGB BLD-MCNC: 9.8 G/DL
LYMPHOCYTES # BLD AUTO: 1.2 K/UL
LYMPHOCYTES NFR BLD: 24.6 %
MCH RBC QN AUTO: 27.8 PG
MCHC RBC AUTO-ENTMCNC: 30.8 G/DL
MCV RBC AUTO: 90 FL
MONOCYTES # BLD AUTO: 0.5 K/UL
MONOCYTES NFR BLD: 9.1 %
NEUTROPHILS # BLD AUTO: 3 K/UL
NEUTROPHILS NFR BLD: 61.1 %
PLATELET # BLD AUTO: 205 K/UL
PMV BLD AUTO: 9.7 FL
POTASSIUM SERPL-SCNC: 4 MMOL/L
PROT SERPL-MCNC: 6.1 G/DL
RBC # BLD AUTO: 3.53 M/UL
SODIUM SERPL-SCNC: 142 MMOL/L
T4 FREE SERPL-MCNC: 1 NG/DL
TSH SERPL DL<=0.005 MIU/L-ACNC: 6.39 UIU/ML
WBC # BLD AUTO: 4.96 K/UL

## 2018-05-17 PROCEDURE — 84439 ASSAY OF FREE THYROXINE: CPT

## 2018-05-17 PROCEDURE — 99214 OFFICE O/P EST MOD 30 MIN: CPT | Mod: S$GLB,,, | Performed by: INTERNAL MEDICINE

## 2018-05-17 PROCEDURE — 99499 UNLISTED E&M SERVICE: CPT | Mod: S$PBB,,, | Performed by: INTERNAL MEDICINE

## 2018-05-17 PROCEDURE — 84443 ASSAY THYROID STIM HORMONE: CPT

## 2018-05-17 PROCEDURE — 99999 PR PBB SHADOW E&M-EST. PATIENT-LVL III: CPT | Mod: PBBFAC,,, | Performed by: OPHTHALMOLOGY

## 2018-05-17 PROCEDURE — 92014 COMPRE OPH EXAM EST PT 1/>: CPT | Mod: S$GLB,,, | Performed by: OPHTHALMOLOGY

## 2018-05-17 PROCEDURE — 80053 COMPREHEN METABOLIC PANEL: CPT

## 2018-05-17 PROCEDURE — 99999 PR PBB SHADOW E&M-EST. PATIENT-LVL III: CPT | Mod: PBBFAC,,, | Performed by: INTERNAL MEDICINE

## 2018-05-17 PROCEDURE — 3008F BODY MASS INDEX DOCD: CPT | Mod: CPTII,S$GLB,, | Performed by: INTERNAL MEDICINE

## 2018-05-17 PROCEDURE — 85025 COMPLETE CBC W/AUTO DIFF WBC: CPT

## 2018-05-17 PROCEDURE — 36415 COLL VENOUS BLD VENIPUNCTURE: CPT

## 2018-05-17 RX ORDER — LEVOTHYROXINE SODIUM 112 UG/1
112 TABLET ORAL
Qty: 90 TABLET | Refills: 3 | Status: SHIPPED | OUTPATIENT
Start: 2018-05-17 | End: 2018-05-19 | Stop reason: SDUPTHER

## 2018-05-17 RX ORDER — POTASSIUM CHLORIDE 750 MG/1
TABLET, EXTENDED RELEASE ORAL
COMMUNITY
Start: 2018-05-14 | End: 2018-06-08 | Stop reason: DRUGHIGH

## 2018-05-17 NOTE — LETTER
[facility] - Ophthalmology  [address]  [facility] [address]  Phone: [phone]  Fax: [phone]   May 17, 2018    Angela Leal, OD  [address]  [facility] [address]    Patient: Tasha Hawley   MR Number: [id number]   YOB: 1955   Date of Visit: 5/17/2018       Dear Dr. Leal:    Thank you for referring Tasha Hawley to me for evaluation. Here is my assessment and plan of care:    Assessment:   /Plan     For exam results, see Encounter Report.    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 I was able to correct her right eye to 20/20 (-3) and her left eye to 20/60 (+2). She has some decrease in vision in her left eye related to her lens changes. She will return to Dr. Leal for continued eye care. She will return to me as requested.          Plan:       For exam results, see Encounter Report.    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 I was able to correct her right eye to 20/20 (-3) and her left eye to 20/60 (+2). She has some decrease in vision in her left eye related to her lens changes. She will return to Dr. Leal for continued eye care. She will return to me as requested.            Below you will find my full exam findings. If you have questions, please do not hesitate to call me. I look forward to following Ms. Tasha Hawley along with you.    Sincerely,          Apollo Aggarwal MD       CC  No Recipients             Base Eye Exam     Visual Acuity (Snellen - Linear)       Right Left    Dist cc 20/60 -2+4 20/60 +2    Dist ph cc NI 20/50 +1    Correction:  Glasses          Tonometry (Tonopen, 3:25 PM)       Right Left    Pressure 14 13          Pupils       Dark Light Shape React APD    Right 4 3 Round Brisk None    Left 4 3 Round Brisk None          Visual Fields       Right Left     Full Full          Extraocular Movement       Right  Left     Full, Ortho Full, Ortho          Neuro/Psych     Oriented x3:  Yes    Mood/Affect:  Normal          Dilation     Both eyes:  2.5% Phenylephrine, 1% Mydriacyl @ 3:25 PM            Slit Lamp and Fundus Exam     External Exam       Right Left    External Normal Normal          Slit Lamp Exam       Right Left    Lids/Lashes Normal Normal    Conjunctiva/Sclera White and quiet White and quiet    Cornea Clear. Rapid tear break up Clear. Rapid tear break up    Anterior Chamber Deep and quiet Deep and quiet    Iris Round and reactive Round and reactive    Lens 1+ Nuclear sclerosis Posterior chamber intraocular lens          Fundus Exam       Right Left    Disc Normal Normal    C/D Ratio 0.2 0.2    Macula Normal Normal    Vessels Normal Normal    Periphery Normal Normal            Refraction     Manifest Refraction       Sphere Cylinder Axis Dist VA    Right +0.75 +0.75 005 20/20 -3    Left +0.75 +2.00 180 20/60 +2

## 2018-05-17 NOTE — PROGRESS NOTES
Subjective:       Patient ID: Tasha Hawley is a 62 y.o. female.    Chief Complaint: Leg Swelling    HPI - Ms Hawley is having worse pain in her back and legs today.  She says her LE edema is worsening - she missed lasix twice last week b/c she didn't have potassium to take with it.  She wraps them and puts on compression stockings over the wrapping, but this doesn't work.  Podiatry confirmed that she's going to have this the rest of her life, and I reinforced that.  She's due for pneumovax.  Still has the suprapubic catheter.  She needs some refills today.  She is not a smoker.    PMH:  Chronic insomnia.  Chronic low back pain with narcotic use.  Indwelling narcotic pump  History CVA with dysarthria  Neurogenic bladder, with recurrent UTI's.  SP catheter placed Nov 2016  Hypothyroidism, stable  CECI, not on CPAP  CAD, with ACS in Jan 2016 - subtot mid LAD lesion without PCI; ischemic CM with EF 40% and chronic LE edema. Followed by [facility]ology  Anxiety and Depression  Chronic constipation, likely d/t ongoing narcotic use  Intermittent nausea  Chronic bilateral LE lymphedema     Meds: Reviewed and reconciled in EPIC with patient during visit today.     Review of Systems   Constitutional: Negative for fever.   HENT: Negative for congestion.    Respiratory: Negative for shortness of breath.    Cardiovascular: Positive for leg swelling.   Gastrointestinal: Negative for abdominal pain.   Genitourinary: Negative for difficulty urinating.   Musculoskeletal: Positive for arthralgias and back pain.   Skin: Negative for rash.   Neurological: Negative for headaches.   Psychiatric/Behavioral: Positive for sleep disturbance.       Objective:      Physical Exam   Constitutional: She is oriented to person, place, and time. She appears well-developed and well-nourished.   Chronically ill appearing woman, examined in wheelchair b/c of mobility impairment   HENT:   Head: Normocephalic and atraumatic.   Cardiovascular:  Normal rate, regular rhythm and normal heart sounds.  Exam reveals no gallop and no friction rub.    No murmur heard.  Pulmonary/Chest: Effort normal and breath sounds normal. No respiratory distress. She has no wheezes. She has no rales. She exhibits no tenderness.   Musculoskeletal: She exhibits edema (4++ edema to thighs bilaterally; stable).   Neurological: She is alert and oriented to person, place, and time.   Skin: Skin is warm and dry. No erythema.   Psychiatric: She has a normal mood and affect.   Nursing note and vitals reviewed.      Assessment:       1. Primary hypothyroidism    2. Paresthesia of both lower extremities    3. Neurogenic bladder    4. Narcotic dependency, continuous    5. Lymphedema of both lower extremities    6. Gastroesophageal reflux disease, esophagitis presence not specified    7. Generalized anxiety disorder    8. Need for vaccination against Streptococcus pneumoniae    9. Anemia, unspecified type        Plan:       Tasha was seen today for leg swelling.    Diagnoses and all orders for this visit:    Primary hypothyroidism - refilling synthroid.  TSH has been all over the place, but should be more stable now b/c of improved adherence  -     levothyroxine (SYNTHROID) 112 MCG tablet; Take 1 tablet (112 mcg total) by mouth before breakfast.  -     TSH; Future    Paresthesia of both lower extremities    Neurogenic bladder    Narcotic dependency, continuous    Lymphedema of both lower extremities - we spent much of the visit discussing this.  There's really not a great solution here - wrapping, compression stockings, elevation and diuresis is all we can really do.  She will always have this  -     Comprehensive metabolic panel; Future    Gastroesophageal reflux disease, esophagitis presence not specified - stable, refilling medications  -     ranitidine (ZANTAC) 150 MG capsule; Take 1 capsule (150 mg total) by mouth 2 (two) times daily.    Generalized anxiety disorder    Need for  vaccination against Streptococcus pneumoniae    Anemia, unspecified type - noted in chart.  Will repeat to make sure it's improved  -     CBC auto differential; Future    rtc prn, or in 6 months    PAPO Hodges MD MPH  Staff Internist

## 2018-05-17 NOTE — PROGRESS NOTES
HPI     Referred by    10/13/2015 Alessia  Patient here for evaluation OU blurry vision.  Patient states OU seeing double vision(side by side) x few months.  No eye pain, just annoying.    I have personally interviewed the patient, reviewed the history and   examined the patient and agree with the technician's exam.    Last edited by Apollo Aggarwal MD on 5/17/2018  2:48 PM. (History)            Assessment /Plan     For exam results, see Encounter Report.    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 I was able to correct her right eye to 20/20 (-3) and her left eye to 20/60 (+2). She has some decrease in vision in her left eye related to her lens changes. She will return to Dr. Leal for continued eye care. She will return to me as requested.

## 2018-05-19 ENCOUNTER — PATIENT MESSAGE (OUTPATIENT)
Dept: INTERNAL MEDICINE | Facility: CLINIC | Age: 63
End: 2018-05-19

## 2018-05-19 DIAGNOSIS — E03.9 PRIMARY HYPOTHYROIDISM: Chronic | ICD-10-CM

## 2018-05-19 RX ORDER — LEVOTHYROXINE SODIUM 125 UG/1
125 TABLET ORAL
Qty: 90 TABLET | Refills: 3 | Status: SHIPPED | OUTPATIENT
Start: 2018-05-19 | End: 2018-05-21 | Stop reason: SDUPTHER

## 2018-05-20 RX ORDER — SODIUM CHLORIDE 9 MG/ML
INJECTION, SOLUTION INTRAVENOUS CONTINUOUS
Status: CANCELLED | OUTPATIENT
Start: 2018-05-20

## 2018-05-21 ENCOUNTER — TELEPHONE (OUTPATIENT)
Dept: INTERNAL MEDICINE | Facility: CLINIC | Age: 63
End: 2018-05-21

## 2018-05-21 DIAGNOSIS — E03.9 PRIMARY HYPOTHYROIDISM: Chronic | ICD-10-CM

## 2018-05-21 RX ORDER — LEVOTHYROXINE SODIUM 125 UG/1
125 TABLET ORAL
Qty: 90 TABLET | Refills: 3 | Status: SHIPPED | OUTPATIENT
Start: 2018-05-21 | End: 2019-05-28 | Stop reason: SDUPTHER

## 2018-05-21 RX ORDER — LEVOTHYROXINE SODIUM 125 UG/1
125 TABLET ORAL
Qty: 90 TABLET | Refills: 3 | Status: SHIPPED | OUTPATIENT
Start: 2018-05-21 | End: 2018-05-21 | Stop reason: SDUPTHER

## 2018-05-22 ENCOUNTER — ANESTHESIA EVENT (OUTPATIENT)
Dept: ENDOSCOPY | Facility: HOSPITAL | Age: 63
End: 2018-05-22
Payer: MEDICARE

## 2018-05-22 NOTE — PROGRESS NOTES
Subjective:      Patient ID: Tasha Hawley is a 62 y.o. female.    Chief Complaint: Foot Problem (PCP  3/8/18) and Foot Pain    Tasha is a 62 y.o. female who presents to the clinic for evaluation and treatment of high risk feet. Tasha has a past medical history of Anticoagulant long-term use; Anxiety; Arthritis; Bilateral lower extremity edema; Carotid artery occlusion; Cataract; Depression; Fever blister; Hypothyroid; Iron deficiency anemia; Lumbar radiculopathy; Ocular migraine; and Sleep apnea. The patient's chief complaint is long, thick toenails, pt is also c/o swelling in both of her legs.  This patient has documented high risk feet requiring routine maintenance secondary to peripheral vascular disease.    PCP: Mesfin Hodges Ii, MD    Date Last Seen by PCP: 3/8/2018    Current shoe gear:  Affected Foot: Slip-on shoes     Unaffected Foot: Slip-on shoes    Last encounter in this department: Visit date not found    Hemoglobin A1C   Date Value Ref Range Status   08/25/2016 5.4 4.5 - 6.2 % Final     Comment:     According to ADA guidelines, hemoglobin A1C <7.0% represents  optimal control in non-pregnant diabetic patients.  Different  metrics may apply to specific populations.   Standards of Medical Care in Diabetes - 2016.  For the purpose of screening for the presence of diabetes:  <5.7%     Consistent with the absence of diabetes  5.7-6.4%  Consistent with increasing risk for diabetes   (prediabetes)  >or=6.5%  Consistent with diabetes  Currently no consensus exists for use of hemoglobin A1C  for diagnosis of diabetes for children.     03/28/2015 5.4 4.5 - 6.2 % Final   05/28/2014 5.8 4.5 - 6.2 % Final       Review of Systems   Constitution: Negative for chills, fever and malaise/fatigue.   HENT: Negative for hearing loss.    Cardiovascular: Positive for leg swelling. Negative for claudication.   Respiratory: Negative for shortness of breath.    Skin: Positive for color change, dry skin, nail  changes and unusual hair distribution. Negative for flushing and rash.   Musculoskeletal: Positive for joint pain and joint swelling. Negative for myalgias.   Neurological: Negative for loss of balance, numbness, paresthesias and sensory change.   Psychiatric/Behavioral: Negative for altered mental status.           Objective:      Physical Exam   Constitutional: She appears well-developed and well-nourished. She is cooperative.   Cardiovascular:   Pulses:       Dorsalis pedis pulses are 0 on the right side, and 0 on the left side.        Posterior tibial pulses are 0 on the right side, and 0 on the left side.   Diffuse non-pitting LE edema noted b/L   Musculoskeletal: She exhibits edema and tenderness.        Right ankle: She exhibits decreased range of motion and abnormal pulse. Achilles tendon exhibits normal Tobin's test results.        Left ankle: She exhibits decreased range of motion and abnormal pulse. Achilles tendon exhibits normal Tobin's test results.        Right foot: There is decreased range of motion and deformity.        Left foot: There is decreased range of motion and deformity.   Decreased height of medial arches noted with loading of the foot b/L  Hammertoes noted, digits 2-5b/L with adductovarus rotation of b/L fifth digit.    Decreased ROM with out joint pain nor crepitation to bilateral feet and ankle joints.  Muscle strength 4/5 in all groups bilaterally     Neurological: She is alert.   Diminished protective sensation noted b/L     Skin: Skin is dry. Capillary refill takes more than 3 seconds.   Skin is thin, terse, shiny, and cool, b/L.  Nails x10 are elongated by  3-5mm's, thickened by 2-4 mm's, dystrophic, and are yellowishin  coloration . Xerosis Bilaterally. No open lesions noted.   Bursa/cysts noted on plantar 5th met base b/L   Psychiatric: She is slowed.   Pt was lethargic during visit She is inattentive.   Vitals reviewed.            Assessment:       Encounter Diagnoses    Name Primary?    Disease of nail Yes    Swelling     PVD (peripheral vascular disease)          Plan:       Tasha was seen today for foot problem and foot pain.    Diagnoses and all orders for this visit:    Disease of nail    Swelling    PVD (peripheral vascular disease)      I counseled the patient on her conditions, their implications and medical management.        - Shoe inspection. Patient instructed on proper foot hygeine. We discussed wearing proper shoe gear, daily foot inspections, never walking without protective shoe gear, never putting sharp instruments to feet, routine podiatric nail visits every 2-3 months.      - With patient's permission, nails were aggressively reduced and debrided x 10 to their soft tissue attachment mechanically and with electric , removing all offending nail and debris. Patient relates relief following the procedure. She will continue to monitor the areas daily, inspect her feet, wear protective shoe gear when ambulatory, moisturizer to maintain skin integrity and follow in this office in approximately 2-3 months, sooner p.r.n.    -Unna boot applied to both legs for swelling by Katya BLAND RN under my direct supervision. Pt advised to keep them clean and dry and to RTC in 1 week.

## 2018-05-23 ENCOUNTER — TELEPHONE (OUTPATIENT)
Dept: ENDOSCOPY | Facility: HOSPITAL | Age: 63
End: 2018-05-23

## 2018-05-23 ENCOUNTER — SURGERY (OUTPATIENT)
Age: 63
End: 2018-05-23

## 2018-05-23 ENCOUNTER — ANESTHESIA (OUTPATIENT)
Dept: ENDOSCOPY | Facility: HOSPITAL | Age: 63
End: 2018-05-23
Payer: MEDICARE

## 2018-05-23 ENCOUNTER — HOSPITAL ENCOUNTER (OUTPATIENT)
Facility: HOSPITAL | Age: 63
Discharge: HOME | End: 2018-05-23
Attending: INTERNAL MEDICINE | Admitting: INTERNAL MEDICINE
Payer: MEDICARE

## 2018-05-23 VITALS
OXYGEN SATURATION: 98 % | DIASTOLIC BLOOD PRESSURE: 56 MMHG | TEMPERATURE: 98 F | HEART RATE: 59 BPM | HEIGHT: 67 IN | WEIGHT: 153 LBS | BODY MASS INDEX: 24.01 KG/M2 | SYSTOLIC BLOOD PRESSURE: 102 MMHG | RESPIRATION RATE: 16 BRPM

## 2018-05-23 DIAGNOSIS — R13.19 ESOPHAGEAL DYSPHAGIA: Primary | ICD-10-CM

## 2018-05-23 DIAGNOSIS — R13.19 ESOPHAGEAL DYSPHAGIA: ICD-10-CM

## 2018-05-23 PROCEDURE — C1726 CATH, BAL DIL, NON-VASCULAR: HCPCS | Performed by: INTERNAL MEDICINE

## 2018-05-23 PROCEDURE — 37000009 HC ANESTHESIA EA ADD 15 MINS: Performed by: INTERNAL MEDICINE

## 2018-05-23 PROCEDURE — 43249 ESOPH EGD DILATION <30 MM: CPT | Performed by: INTERNAL MEDICINE

## 2018-05-23 PROCEDURE — 63600175 PHARM REV CODE 636 W HCPCS: Performed by: NURSE ANESTHETIST, CERTIFIED REGISTERED

## 2018-05-23 PROCEDURE — 25000003 PHARM REV CODE 250: Performed by: NURSE ANESTHETIST, CERTIFIED REGISTERED

## 2018-05-23 PROCEDURE — 43249 ESOPH EGD DILATION <30 MM: CPT | Mod: ,,, | Performed by: INTERNAL MEDICINE

## 2018-05-23 PROCEDURE — 63600175 PHARM REV CODE 636 W HCPCS: Performed by: ANESTHESIOLOGY

## 2018-05-23 PROCEDURE — 37000008 HC ANESTHESIA 1ST 15 MINUTES: Performed by: INTERNAL MEDICINE

## 2018-05-23 PROCEDURE — E9220 PRA ENDO ANESTHESIA: HCPCS | Mod: ,,, | Performed by: NURSE ANESTHETIST, CERTIFIED REGISTERED

## 2018-05-23 PROCEDURE — 25000003 PHARM REV CODE 250: Performed by: INTERNAL MEDICINE

## 2018-05-23 RX ORDER — PROPOFOL 10 MG/ML
VIAL (ML) INTRAVENOUS CONTINUOUS PRN
Status: DISCONTINUED | OUTPATIENT
Start: 2018-05-23 | End: 2018-05-23

## 2018-05-23 RX ORDER — GLYCOPYRROLATE 0.2 MG/ML
INJECTION INTRAMUSCULAR; INTRAVENOUS
Status: DISCONTINUED | OUTPATIENT
Start: 2018-05-23 | End: 2018-05-23

## 2018-05-23 RX ORDER — SODIUM CHLORIDE 9 MG/ML
INJECTION, SOLUTION INTRAVENOUS CONTINUOUS
Status: DISCONTINUED | OUTPATIENT
Start: 2018-05-23 | End: 2018-05-23 | Stop reason: HOSPADM

## 2018-05-23 RX ORDER — MIDAZOLAM HYDROCHLORIDE 1 MG/ML
1 INJECTION INTRAMUSCULAR; INTRAVENOUS ONCE
Status: COMPLETED | OUTPATIENT
Start: 2018-05-23 | End: 2018-05-23

## 2018-05-23 RX ORDER — LIDOCAINE HCL/PF 100 MG/5ML
SYRINGE (ML) INTRAVENOUS
Status: DISCONTINUED | OUTPATIENT
Start: 2018-05-23 | End: 2018-05-23

## 2018-05-23 RX ORDER — PROPOFOL 10 MG/ML
VIAL (ML) INTRAVENOUS
Status: DISCONTINUED | OUTPATIENT
Start: 2018-05-23 | End: 2018-05-23

## 2018-05-23 RX ADMIN — GLYCOPYRROLATE 0.2 MG: 0.2 INJECTION, SOLUTION INTRAMUSCULAR; INTRAVENOUS at 03:05

## 2018-05-23 RX ADMIN — LIDOCAINE HYDROCHLORIDE 40 MG: 20 INJECTION, SOLUTION INTRAVENOUS at 03:05

## 2018-05-23 RX ADMIN — SODIUM CHLORIDE: 9 INJECTION, SOLUTION INTRAVENOUS at 02:05

## 2018-05-23 RX ADMIN — MIDAZOLAM HYDROCHLORIDE 1 MG: 1 INJECTION, SOLUTION INTRAMUSCULAR; INTRAVENOUS at 03:05

## 2018-05-23 RX ADMIN — PROPOFOL 100 MG: 10 INJECTION, EMULSION INTRAVENOUS at 03:05

## 2018-05-23 RX ADMIN — PROPOFOL 200 MCG/KG/MIN: 10 INJECTION, EMULSION INTRAVENOUS at 03:05

## 2018-05-23 NOTE — H&P
[facility]  History & Physical    Subjective:      Chief Complaint/Reason for Admission:     EGD    Tasha Hawley is a 62 y.o. female.    Past Medical History:   Diagnosis Date    Anticoagulant long-term use     Anxiety     Arthritis     Bilateral lower extremity edema     severe chronic    Carotid artery occlusion     Cataract     Depression     Fever blister     Hypothyroid     Iron deficiency anemia     Lumbar radiculopathy     with chronic pain    Ocular migraine     Sleep apnea     cpap     Past Surgical History:   Procedure Laterality Date    BACK SURGERY      x 12    CATARACT EXTRACTION W/  INTRAOCULAR LENS IMPLANT Left     Dr Coleman     HYSTERECTOMY  1975    endometriosis    pain pump placement      SQ Dilaudid Pump managed by Dr. Hillman, Pain Management    SPINAL CORD STIMULATOR REMOVAL      before [place]    SPINE SURGERY  5-13-13    CERVICAL FUSION     Family History   Problem Relation Age of Onset    Cancer Mother 55        breast    Cancer Father         esophagus,had laryngectomy    Esophageal cancer Father     Parkinsonism Maternal Grandmother     Tremor Maternal Grandmother     No Known Problems Brother     No Known Problems Brother     Heart disease Maternal Uncle     No Known Problems Sister     No Known Problems Maternal Aunt     No Known Problems Paternal Aunt     No Known Problems Paternal Uncle     No Known Problems Maternal Grandfather     No Known Problems Paternal Grandmother     No Known Problems Paternal Grandfather     Melanoma Neg Hx     Amblyopia Neg Hx     Blindness Neg Hx     Cataracts Neg Hx     Diabetes Neg Hx     Glaucoma Neg Hx     Hypertension Neg Hx     Macular degeneration Neg Hx     Retinal detachment Neg Hx     Strabismus Neg Hx     Stroke Neg Hx     Thyroid disease Neg Hx     Colon cancer Neg Hx      Social History   Substance Use Topics    Smoking status: Never Smoker    Smokeless tobacco: Never  Used    Alcohol use No      Comment: denies       PTA Medications   Medication Sig    aspirin (ECOTRIN) 81 MG EC tablet Take 1 tablet (81 mg total) by mouth once daily.    atorvastatin (LIPITOR) 80 MG tablet TAKE ONE TABLET BY MOUTH EVERY DAY. (Patient taking differently: TAKE ONE TABLET BY MOUTH Nightly)    DOC-Q-LACE 100 mg capsule TAKE ONE CAPSULE BY MOUTH THREE TIMES DAILY AS NEEDED FOR CONSTIPATION    FLUoxetine (PROZAC) 20 MG capsule Take 3 capsules (60 mg total) by mouth once daily.    furosemide (LASIX) 40 MG tablet Take 1 tablet (40 mg total) by mouth 2 (two) times daily.    hydrocodone-acetaminophen 10-325mg (NORCO)  mg Tab Take 2 tablets by mouth every 4 (four) hours as needed for Pain.     Lactobacillus acidophilus (PROBIOTIC) 10 billion cell Cap Take 1 capsule by mouth once daily.    levothyroxine (SYNTHROID) 125 MCG tablet Take 1 tablet (125 mcg total) by mouth before breakfast.    ondansetron (ZOFRAN) 8 MG tablet TAKE ONE TABLET BY MOUTH EVERY TWELVE HOURS AS NEEDED FOR NAUSEA    pantoprazole (PROTONIX) 40 MG tablet Take 1 tablet (40 mg total) by mouth once daily.    potassium chloride (KLOR-CON) 10 MEQ TbSR     potassium chloride SA (K-DUR,KLOR-CON) 20 MEQ tablet TAKE THREE TABLETS BY MOUTH DAILY    potassium citrate (UROCIT-K) 5 mEq (540 mg) TbSR Take 1 tablet (5 mEq total) by mouth 3 (three) times daily with meals.    QUEtiapine (SEROQUEL) 100 MG Tab     ranitidine (ZANTAC) 150 MG capsule Take 1 capsule (150 mg total) by mouth 2 (two) times daily.    traZODone (DESYREL) 100 MG tablet Take 2 tablets (200 mg total) by mouth every evening.    docosanol (ABREVA) 10 % Crea Apply 1 application topically 2 (two) times daily.    SENNOSIDES (SENNA LAX ORAL) Take 20 mg by mouth every evening.     tiZANidine (ZANAFLEX) 4 MG tablet      Review of patient's allergies indicates:   Allergen Reactions    (d)-limonene flavor      Other reaction(s): difficult intubation  Other reaction(s):  Difficulty breathing    Bactrim [sulfamethoxazole-trimethoprim] Anaphylaxis    Imitrex [sumatriptan succinate] Shortness Of Breath    Penicillins Shortness Of Breath     Other reaction(s): Jittery    Topamax [topiramate] Shortness Of Breath    Vancomycin Shortness Of Breath     Rash    Butorphanol tartrate     Darvocet a500 [propoxyphene n-acetaminophen]      Other reaction(s): Difficulty breathing    Fentanyl      Other reaction(s): Vomiting  Other reaction(s): Nausea  Other reaction(s): Itching  swelling    White petrolatum-zinc     Zinc oxide-white petrolatum      Other reaction(s): Difficulty breathing    Latex, natural rubber Itching and Rash        Review of Systems   Constitutional: Negative for chills and fever.   Respiratory: Negative for shortness of breath.    Cardiovascular: Negative for chest pain.   Gastrointestinal: Negative for abdominal pain.       Objective:      Vital Signs (Most Recent)  Temp: 97.8 °F (36.6 °C) (05/23/18 1429)  Pulse: (!) 48 (05/23/18 1429)  Resp: 16 (05/23/18 1429)  BP: (!) 107/55 (05/23/18 1429)  SpO2: 99 % (05/23/18 1429)    Vital Signs Range (Last 24H):  Temp:  [97.8 °F (36.6 °C)]   Pulse:  [48]   Resp:  [16]   BP: (107)/(55)   SpO2:  [99 %]     Physical Exam   Constitutional: She appears well-developed and well-nourished.   Cardiovascular: Normal rate.    Pulmonary/Chest: Effort normal.   Abdominal: Soft.   Skin: Skin is warm and dry.   Psychiatric: She has a normal mood and affect. Her behavior is normal. Judgment and thought content normal.           Assessment:      Active Hospital Problems    Diagnosis  POA    Esophageal dysphagia [R13.10]  Yes      Resolved Hospital Problems    Diagnosis Date Resolved POA   No resolved problems to display.       Plan:    Dysphagia EGD for evaluation

## 2018-05-23 NOTE — PLAN OF CARE
Pt verbalized an understanding of discharge instructions including diet, s/s to notify md, and follow up.  Pt left unit via wheelchair escorted by spouse.

## 2018-05-23 NOTE — ANESTHESIA PREPROCEDURE EVALUATION
05/23/2018  Tasha Hawley is a 62 y.o., female.    Anesthesia Evaluation         Review of Systems  Anesthesia Hx:  No problems with previous Anesthesia   Social:  Non-Smoker    EENT/Dental:   Hard of hearing   Cardiovascular:   Exercise tolerance: poor Hypertension CAD   CHF JONES   CONCLUSIONS     1 - Normal left ventricular systolic function (EF 60-65%).     2 - Impaired LV relaxation, normal LAP (grade 1 diastolic dysfunction).     3 - Normal right ventricular systolic function .     4 - Eccentric hypertrophy.     5 - Severe left atrial enlargement.    Pulmonary:   Sleep Apnea    Renal/:  Renal/ Normal     Hepatic/GI:   GERD    Musculoskeletal:   Arthritis   Spine Disorders: lumbar    Neurological:   Neuromuscular Disease, Headaches   Chronic Pain Syndrome  Peripheral Neuropathy    Endocrine:   Hypothyroidism    Psych:   Psychiatric History depression          Physical Exam  General:  Well nourished    Airway/Jaw/Neck:  Airway Findings: Mouth Opening: Normal Tongue: Normal  General Airway Assessment: Adult  Mallampati: II  TM Distance: Normal, at least 6 cm       Chest/Lungs:  Chest/Lungs Clear    Heart/Vascular:  Heart Findings: Normal            Anesthesia Plan  Type of Anesthesia, risks & benefits discussed:  Anesthesia Type:  general  Patient's Preference:   Intra-op Monitoring Plan: standard ASA monitors  Intra-op Monitoring Plan Comments:   Post Op Pain Control Plan: IV/PO Opioids PRN  Post Op Pain Control Plan Comments:   Induction:   IV  Beta Blocker:  Patient is not currently on a Beta-Blocker (No further documentation required).       Informed Consent: Patient understands risks and agrees with Anesthesia plan.  Questions answered. Anesthesia consent signed with patient.  ASA Score: 3     Day of Surgery Review of History & Physical:    H&P update referred to the surgeon.     Anesthesia  Plan Notes: I have personally evaluated the patient and discussed risk/benefits/alternatives of general anesthesia.        Ready For Surgery From Anesthesia Perspective.

## 2018-05-23 NOTE — TRANSFER OF CARE
"Anesthesia Transfer of Care Note    Patient: Tasha Hawley    Procedure(s) Performed: Procedure(s) (LRB):  ESOPHAGOGASTRODUODENOSCOPY (EGD) (N/A)    Patient location: [facility]    Anesthesia Type: general    Transport from OR: Transported from OR on room air with adequate spontaneous ventilation    Post pain: adequate analgesia    Post assessment: no apparent anesthetic complications and tolerated procedure well    Post vital signs: stable    Level of consciousness: sedated and responds to stimulation    Nausea/Vomiting: no nausea/vomiting    Complications: none    Transfer of care protocol was followed      Last vitals:   Visit Vitals  BP (!) 99/51 (BP Location: Left arm, Patient Position: Lying)   Pulse (!) 55   Temp 36.7 °C (98.1 °F)   Resp 16   Ht 5' 7" (1.702 m)   Wt 69.4 kg (153 lb)   LMP  (LMP Unknown)   SpO2 97%   Breastfeeding? No   BMI 23.96 kg/m²     "

## 2018-05-23 NOTE — [employer] PATIENT INSTRUCTIONS
Discharge Summary/Instructions after an Endoscopic Procedure  Patient Name: Tasha Hawley  Patient MRN: [id number]  Patient YOB: 1955  Wednesday, May 23, 2018  Prince Vance MD  RESTRICTIONS:  During your procedure today, you received medications for sedation.  These   medications may affect your judgment, balance and coordination.  Therefore,   for 24 hours, you have the following restrictions:   - DO NOT drive a car, operate machinery, make legal/financial decisions,   sign important papers or drink alcohol.    ACTIVITY:  The following day: return to full activity including work, except no heavy   lifting, straining or running for 3 days if polyps were removed.  DIET:  Eat and drink normally unless instructed otherwise.     TREATMENT FOR COMMON SIDE EFFECTS:  - Mild abdominal pain, nausea, belching, bloating or excessive gas:  rest,   eat lightly and use a heating pad.  - Sore Throat: treat with throat lozenges and/or gargle with warm salt   water.  - Because air was used during the procedure, expelling large amounts of air   from your rectum or belching is normal.  - If a bowel prep was taken, you may not have a bowel movement for 1-3 days.    This is normal.  SYMPTOMS TO WATCH FOR AND REPORT TO YOUR PHYSICIAN:  1. Abdominal pain or bloating, other than gas cramps.  2. Chest pain.  3. Back pain.  4. Signs of infection such as: chills or fever occurring within 24 hours   after the procedure.  5. Rectal bleeding, which would show as bright red, maroon, or black stools.   (A tablespoon of blood from the rectum is not serious, especially if   hemorrhoids are present.)  6. Vomiting.  7. Weakness or dizziness.  GO DIRECTLY TO THE NEAREST EMERGENCY ROOM IF YOU HAVE ANY OF THE FOLLOWING:      Difficulty breathing              Chills and/or fever over 101 F   Persistent vomiting and/or vomiting blood   Severe abdominal pain   Severe chest pain   Black, tarry stools   Bleeding- more than one  tablespoon   Any other symptom or condition that you feel may need urgent attention  Your doctor recommends these additional instructions:  If any biopsies were taken, your doctors clinic will contact you in 1 to 2   weeks with any results.  - Discharge patient to home.   - Perform an esophagram at the next available appointment.   - Perform routine esophageal manometry.   - The findings and recommendations were discussed with the patient.   - Return to GI clinic.  For questions, problems or results please call your physician - Prince Vance MD at Work:  [phone].  [facility], EMERGENCY ROOM PHONE NUMBER: [phone]  IF A COMPLICATION OR EMERGENCY SITUATION ARISES AND YOU ARE UNABLE TO REACH   YOUR PHYSICIAN - GO DIRECTLY TO THE EMERGENCY ROOM.  Prince Vance MD  5/23/2018 4:08:54 PM  This report has been verified and signed electronically.  [employer]

## 2018-05-24 ENCOUNTER — TELEPHONE (OUTPATIENT)
Dept: INTERNAL MEDICINE | Facility: CLINIC | Age: 63
End: 2018-05-24

## 2018-05-24 NOTE — TELEPHONE ENCOUNTER
----- Message from Lois Johnston sent at 5/24/2018 10:21 AM CDT -----  Contact: Self/[phone]  Patient would like to speak to nurse asap about increase dosage on script.Patient will like to give nurse information. Please advise

## 2018-05-24 NOTE — TELEPHONE ENCOUNTER
Patient wanted to just make sure Dr. Hodges sent synthroid 125 because that what they picked up. I assured the patient that is what Dr. Hodges sent.

## 2018-05-25 NOTE — ANESTHESIA POSTPROCEDURE EVALUATION
"Anesthesia Post Evaluation    Patient: Tasha Hawley    Procedure(s) Performed: Procedure(s) (LRB):  ESOPHAGOGASTRODUODENOSCOPY (EGD) (N/A)    Final Anesthesia Type: general  Patient location during evaluation: GI PACU  Patient participation: Yes- Able to Participate  Level of consciousness: awake and alert and oriented  Post-procedure vital signs: reviewed and stable  Pain management: adequate  Airway patency: patent  PONV status at discharge: No PONV  Anesthetic complications: no      Cardiovascular status: blood pressure returned to baseline  Respiratory status: unassisted, spontaneous ventilation and room air  Hydration status: euvolemic  Follow-up not needed.        Visit Vitals  BP (!) 102/56 (BP Location: Left arm, Patient Position: Lying)   Pulse (!) 59   Temp 36.7 °C (98.1 °F)   Resp 16   Ht 5' 7" (1.702 m)   Wt 69.4 kg (153 lb)   LMP  (LMP Unknown)   SpO2 98%   Breastfeeding? No   BMI 23.96 kg/m²       Pain/Low Score: No Data Recorded      "

## 2018-05-30 ENCOUNTER — TELEPHONE (OUTPATIENT)
Dept: ENDOSCOPY | Facility: HOSPITAL | Age: 63
End: 2018-05-30

## 2018-06-05 ENCOUNTER — TELEPHONE (OUTPATIENT)
Dept: UROLOGY | Facility: CLINIC | Age: 63
End: 2018-06-05

## 2018-06-05 NOTE — TELEPHONE ENCOUNTER
----- Message from Kalina Cohn MA sent at 6/5/2018  2:54 PM CDT -----  Contact: self    [phone]  Needs Advice    Reason for call:    Odor / calcium build up in the tube /pressure in abdomen area  Communication Preference:  Phone# above  Additional Information:  Thinks she is with another odor and needs an antibioitic

## 2018-06-06 ENCOUNTER — TELEPHONE (OUTPATIENT)
Dept: INTERNAL MEDICINE | Facility: CLINIC | Age: 63
End: 2018-06-06

## 2018-06-06 NOTE — TELEPHONE ENCOUNTER
Please call the patient.  Yes, she should be taking all medications as  Prescribed, including her lasix and potassium.

## 2018-06-06 NOTE — TELEPHONE ENCOUNTER
----- Message from Laine Fang sent at 6/6/2018  1:00 PM CDT -----  Contact: patient [phone]  Patient still has medication that was prescribed / lasix and potassium because when she wasn't retaining fluid, she quit taking these medications x 10 days but she wants to know if she should go ahead and start it again.

## 2018-06-07 ENCOUNTER — HOSPITAL ENCOUNTER (INPATIENT)
Facility: HOSPITAL | Age: 63
LOS: 5 days | Discharge: HOME HEALTH | DRG: 699 | End: 2018-06-12
Attending: EMERGENCY MEDICINE | Admitting: HOSPITALIST
Payer: MEDICARE

## 2018-06-07 ENCOUNTER — TELEPHONE (OUTPATIENT)
Dept: INTERNAL MEDICINE | Facility: CLINIC | Age: 63
End: 2018-06-07

## 2018-06-07 ENCOUNTER — NURSE TRIAGE (OUTPATIENT)
Dept: ADMINISTRATIVE | Facility: CLINIC | Age: 63
End: 2018-06-07

## 2018-06-07 DIAGNOSIS — R11.0 NAUSEA: Primary | ICD-10-CM

## 2018-06-07 DIAGNOSIS — N17.9 ACUTE KIDNEY INJURY: ICD-10-CM

## 2018-06-07 DIAGNOSIS — Z93.59 SUPRAPUBIC CATHETER: Chronic | ICD-10-CM

## 2018-06-07 DIAGNOSIS — R11.10 VOMITING, INTRACTABILITY OF VOMITING NOT SPECIFIED, PRESENCE OF NAUSEA NOT SPECIFIED, UNSPECIFIED VOMITING TYPE: ICD-10-CM

## 2018-06-07 DIAGNOSIS — N12 PYELONEPHRITIS: Primary | ICD-10-CM

## 2018-06-07 DIAGNOSIS — N39.0 RECURRENT UTI: ICD-10-CM

## 2018-06-07 DIAGNOSIS — N31.9 NEUROGENIC BLADDER: Chronic | ICD-10-CM

## 2018-06-07 LAB
ALBUMIN SERPL BCP-MCNC: 4.1 G/DL
ALP SERPL-CCNC: 131 U/L
ALT SERPL W/O P-5'-P-CCNC: 10 U/L
ANION GAP SERPL CALC-SCNC: 8 MMOL/L
AST SERPL-CCNC: 16 U/L
BACTERIA #/AREA URNS HPF: ABNORMAL /HPF
BASOPHILS # BLD AUTO: 0.02 K/UL
BASOPHILS NFR BLD: 0.2 %
BILIRUB SERPL-MCNC: 0.5 MG/DL
BILIRUB UR QL STRIP: NEGATIVE
BUN SERPL-MCNC: 18 MG/DL
CALCIUM SERPL-MCNC: 9.8 MG/DL
CHLORIDE SERPL-SCNC: 96 MMOL/L
CLARITY UR: ABNORMAL
CO2 SERPL-SCNC: 30 MMOL/L
COLOR UR: YELLOW
CREAT SERPL-MCNC: 1.5 MG/DL
DIFFERENTIAL METHOD: ABNORMAL
EOSINOPHIL # BLD AUTO: 0.1 K/UL
EOSINOPHIL NFR BLD: 1.1 %
ERYTHROCYTE [DISTWIDTH] IN BLOOD BY AUTOMATED COUNT: 13 %
EST. GFR  (AFRICAN AMERICAN): 43 ML/MIN/1.73 M^2
EST. GFR  (NON AFRICAN AMERICAN): 37 ML/MIN/1.73 M^2
GLUCOSE SERPL-MCNC: 91 MG/DL
GLUCOSE UR QL STRIP: NEGATIVE
HCT VFR BLD AUTO: 39 %
HGB BLD-MCNC: 12.4 G/DL
HGB UR QL STRIP: ABNORMAL
HYALINE CASTS #/AREA URNS LPF: 0 /LPF
KETONES UR QL STRIP: NEGATIVE
LEUKOCYTE ESTERASE UR QL STRIP: ABNORMAL
LIPASE SERPL-CCNC: 34 U/L
LYMPHOCYTES # BLD AUTO: 2.5 K/UL
LYMPHOCYTES NFR BLD: 23.8 %
MCH RBC QN AUTO: 26.6 PG
MCHC RBC AUTO-ENTMCNC: 31.8 G/DL
MCV RBC AUTO: 84 FL
MICROSCOPIC COMMENT: ABNORMAL
MONOCYTES # BLD AUTO: 0.6 K/UL
MONOCYTES NFR BLD: 6 %
NEUTROPHILS # BLD AUTO: 7.3 K/UL
NEUTROPHILS NFR BLD: 68.7 %
NITRITE UR QL STRIP: POSITIVE
PH UR STRIP: 8 [PH] (ref 5–8)
PLATELET # BLD AUTO: 216 K/UL
PMV BLD AUTO: 9.7 FL
POTASSIUM SERPL-SCNC: 3.7 MMOL/L
PROT SERPL-MCNC: 7.7 G/DL
PROT UR QL STRIP: ABNORMAL
RBC # BLD AUTO: 4.66 M/UL
RBC #/AREA URNS HPF: >100 /HPF (ref 0–4)
SODIUM SERPL-SCNC: 134 MMOL/L
SP GR UR STRIP: 1.01 (ref 1–1.03)
SQUAMOUS #/AREA URNS HPF: 3 /HPF
URN SPEC COLLECT METH UR: ABNORMAL
UROBILINOGEN UR STRIP-ACNC: NEGATIVE EU/DL
WBC # BLD AUTO: 10.58 K/UL
WBC #/AREA URNS HPF: 100 /HPF (ref 0–5)
WBC CLUMPS URNS QL MICRO: ABNORMAL

## 2018-06-07 PROCEDURE — 25000003 PHARM REV CODE 250: Performed by: EMERGENCY MEDICINE

## 2018-06-07 PROCEDURE — 83690 ASSAY OF LIPASE: CPT

## 2018-06-07 PROCEDURE — 96365 THER/PROPH/DIAG IV INF INIT: CPT

## 2018-06-07 PROCEDURE — 85025 COMPLETE CBC W/AUTO DIFF WBC: CPT

## 2018-06-07 PROCEDURE — 12000002 HC ACUTE/MED SURGE SEMI-PRIVATE ROOM

## 2018-06-07 PROCEDURE — 80053 COMPREHEN METABOLIC PANEL: CPT

## 2018-06-07 PROCEDURE — 96375 TX/PRO/DX INJ NEW DRUG ADDON: CPT

## 2018-06-07 PROCEDURE — 63600175 PHARM REV CODE 636 W HCPCS: Performed by: EMERGENCY MEDICINE

## 2018-06-07 PROCEDURE — 99285 EMERGENCY DEPT VISIT HI MDM: CPT | Mod: 25

## 2018-06-07 PROCEDURE — 81000 URINALYSIS NONAUTO W/SCOPE: CPT

## 2018-06-07 RX ORDER — ONDANSETRON 2 MG/ML
4 INJECTION INTRAMUSCULAR; INTRAVENOUS
Status: COMPLETED | OUTPATIENT
Start: 2018-06-07 | End: 2018-06-07

## 2018-06-07 RX ORDER — ONDANSETRON 4 MG/1
4 TABLET, ORALLY DISINTEGRATING ORAL EVERY 8 HOURS PRN
Qty: 30 TABLET | Refills: 0 | Status: SHIPPED | OUTPATIENT
Start: 2018-06-07 | End: 2019-01-11

## 2018-06-07 RX ADMIN — SODIUM CHLORIDE 1000 ML: 0.9 INJECTION, SOLUTION INTRAVENOUS at 11:06

## 2018-06-07 RX ADMIN — CEFTRIAXONE 1 G: 1 INJECTION, SOLUTION INTRAVENOUS at 11:06

## 2018-06-07 RX ADMIN — ONDANSETRON 4 MG: 2 INJECTION INTRAMUSCULAR; INTRAVENOUS at 10:06

## 2018-06-07 NOTE — TELEPHONE ENCOUNTER
Reason for Disposition   [1] MODERATE vomiting (e.g., 3 - 5 times/day) AND [2] age > 60    Protocols used:  VOMITING-A-    Pt calling with concerns of vomiting since this morning.  Pt has been taking Zofran for nausea and vomiting but it is not helping per Pt.  Care advice given.

## 2018-06-07 NOTE — TELEPHONE ENCOUNTER
I sent in a prescription for zofran for her to have at home.  She can  b12 vitamins without a prescription if she wants to do that.

## 2018-06-07 NOTE — TELEPHONE ENCOUNTER
----- Message from Sampson Bai sent at 6/7/2018  2:39 PM CDT -----  Contact: Patient [phone]  Stating every time patient drinks liquid throws up, started this morning, stating not feeling bad or any thing requesting Nurse to give patient a call.    Please call and advise  Thank you

## 2018-06-07 NOTE — TELEPHONE ENCOUNTER
Spoke to patient she states she has been really nauseated and throwing up thinks it 's just something she ate. Wants zofran ODT please also requesting something OTC for energy like b-12.

## 2018-06-08 PROBLEM — N12 PYELONEPHRITIS: Status: ACTIVE | Noted: 2018-06-08

## 2018-06-08 PROBLEM — R11.2 INTRACTABLE NAUSEA AND VOMITING: Status: ACTIVE | Noted: 2018-06-08

## 2018-06-08 PROBLEM — N39.0 RECURRENT UTI: Status: ACTIVE | Noted: 2018-06-08

## 2018-06-08 PROBLEM — N39.0 RECURRENT UTI (URINARY TRACT INFECTION): Status: ACTIVE | Noted: 2018-06-08

## 2018-06-08 PROBLEM — N17.9 AKI (ACUTE KIDNEY INJURY): Status: ACTIVE | Noted: 2018-06-08

## 2018-06-08 LAB
ANION GAP SERPL CALC-SCNC: 10 MMOL/L
BACTERIA #/AREA URNS HPF: ABNORMAL /HPF
BILIRUB UR QL STRIP: NEGATIVE
BUN SERPL-MCNC: 15 MG/DL
CALCIUM SERPL-MCNC: 9.1 MG/DL
CHLORIDE SERPL-SCNC: 100 MMOL/L
CLARITY UR: ABNORMAL
CO2 SERPL-SCNC: 25 MMOL/L
COLOR UR: YELLOW
CREAT SERPL-MCNC: 1.2 MG/DL
EST. GFR  (AFRICAN AMERICAN): 56 ML/MIN/1.73 M^2
EST. GFR  (NON AFRICAN AMERICAN): 49 ML/MIN/1.73 M^2
GLUCOSE SERPL-MCNC: 97 MG/DL
GLUCOSE UR QL STRIP: NEGATIVE
HGB UR QL STRIP: ABNORMAL
KETONES UR QL STRIP: NEGATIVE
LACTATE SERPL-SCNC: 1.6 MMOL/L
LEUKOCYTE ESTERASE UR QL STRIP: ABNORMAL
MAGNESIUM SERPL-MCNC: 1.9 MG/DL
MICROSCOPIC COMMENT: ABNORMAL
NITRITE UR QL STRIP: POSITIVE
NON-SQ EPI CELLS #/AREA URNS HPF: 0 /HPF
PH UR STRIP: 6 [PH] (ref 5–8)
POTASSIUM SERPL-SCNC: 3.5 MMOL/L
PROT UR QL STRIP: NEGATIVE
RBC #/AREA URNS HPF: 20 /HPF (ref 0–4)
SODIUM SERPL-SCNC: 135 MMOL/L
SP GR UR STRIP: <=1.005 (ref 1–1.03)
SQUAMOUS #/AREA URNS HPF: 0 /HPF
TSH SERPL DL<=0.005 MIU/L-ACNC: 1.23 UIU/ML
URN SPEC COLLECT METH UR: ABNORMAL
UROBILINOGEN UR STRIP-ACNC: NEGATIVE EU/DL
WBC #/AREA URNS HPF: 50 /HPF (ref 0–5)

## 2018-06-08 PROCEDURE — 63600175 PHARM REV CODE 636 W HCPCS: Performed by: NURSE PRACTITIONER

## 2018-06-08 PROCEDURE — 99223 1ST HOSP IP/OBS HIGH 75: CPT | Mod: 25,,, | Performed by: STUDENT IN AN ORGANIZED HEALTH CARE EDUCATION/TRAINING PROGRAM

## 2018-06-08 PROCEDURE — 87086 URINE CULTURE/COLONY COUNT: CPT | Mod: 59

## 2018-06-08 PROCEDURE — 83605 ASSAY OF LACTIC ACID: CPT

## 2018-06-08 PROCEDURE — 87088 URINE BACTERIA CULTURE: CPT | Mod: 59

## 2018-06-08 PROCEDURE — 81000 URINALYSIS NONAUTO W/SCOPE: CPT

## 2018-06-08 PROCEDURE — 80048 BASIC METABOLIC PNL TOTAL CA: CPT

## 2018-06-08 PROCEDURE — 84443 ASSAY THYROID STIM HORMONE: CPT

## 2018-06-08 PROCEDURE — 63600175 PHARM REV CODE 636 W HCPCS: Performed by: HOSPITALIST

## 2018-06-08 PROCEDURE — 94761 N-INVAS EAR/PLS OXIMETRY MLT: CPT

## 2018-06-08 PROCEDURE — 11000001 HC ACUTE MED/SURG PRIVATE ROOM

## 2018-06-08 PROCEDURE — 87086 URINE CULTURE/COLONY COUNT: CPT

## 2018-06-08 PROCEDURE — 51705 CHANGE OF BLADDER TUBE: CPT | Mod: ,,, | Performed by: STUDENT IN AN ORGANIZED HEALTH CARE EDUCATION/TRAINING PROGRAM

## 2018-06-08 PROCEDURE — 25000003 PHARM REV CODE 250: Performed by: NURSE PRACTITIONER

## 2018-06-08 PROCEDURE — 36415 COLL VENOUS BLD VENIPUNCTURE: CPT

## 2018-06-08 PROCEDURE — 83735 ASSAY OF MAGNESIUM: CPT

## 2018-06-08 PROCEDURE — 25000003 PHARM REV CODE 250: Performed by: HOSPITALIST

## 2018-06-08 PROCEDURE — 87077 CULTURE AEROBIC IDENTIFY: CPT | Mod: 59

## 2018-06-08 PROCEDURE — 87186 SC STD MICRODIL/AGAR DIL: CPT

## 2018-06-08 RX ORDER — FLUOXETINE HYDROCHLORIDE 20 MG/1
60 CAPSULE ORAL DAILY
Status: DISCONTINUED | OUTPATIENT
Start: 2018-06-08 | End: 2018-06-12 | Stop reason: HOSPADM

## 2018-06-08 RX ORDER — SENNOSIDES 8.6 MG/1
17.2 TABLET ORAL DAILY PRN
Status: DISCONTINUED | OUTPATIENT
Start: 2018-06-08 | End: 2018-06-12 | Stop reason: HOSPADM

## 2018-06-08 RX ORDER — HYDROXYZINE HYDROCHLORIDE 25 MG/1
25 TABLET, FILM COATED ORAL EVERY 8 HOURS PRN
Status: DISCONTINUED | OUTPATIENT
Start: 2018-06-08 | End: 2018-06-12 | Stop reason: HOSPADM

## 2018-06-08 RX ORDER — HYDROCODONE BITARTRATE AND ACETAMINOPHEN 10; 325 MG/1; MG/1
1 TABLET ORAL EVERY 4 HOURS PRN
Status: DISCONTINUED | OUTPATIENT
Start: 2018-06-08 | End: 2018-06-12 | Stop reason: HOSPADM

## 2018-06-08 RX ORDER — SODIUM CHLORIDE 9 MG/ML
INJECTION, SOLUTION INTRAVENOUS CONTINUOUS
Status: DISCONTINUED | OUTPATIENT
Start: 2018-06-08 | End: 2018-06-08

## 2018-06-08 RX ORDER — PANTOPRAZOLE SODIUM 40 MG/1
40 TABLET, DELAYED RELEASE ORAL DAILY
Status: DISCONTINUED | OUTPATIENT
Start: 2018-06-08 | End: 2018-06-12 | Stop reason: HOSPADM

## 2018-06-08 RX ORDER — ONDANSETRON 2 MG/ML
4 INJECTION INTRAMUSCULAR; INTRAVENOUS EVERY 8 HOURS PRN
Status: DISCONTINUED | OUTPATIENT
Start: 2018-06-08 | End: 2018-06-12 | Stop reason: HOSPADM

## 2018-06-08 RX ORDER — MORPHINE SULFATE 4 MG/ML
2 INJECTION, SOLUTION INTRAMUSCULAR; INTRAVENOUS EVERY 6 HOURS PRN
Status: DISCONTINUED | OUTPATIENT
Start: 2018-06-08 | End: 2018-06-12 | Stop reason: HOSPADM

## 2018-06-08 RX ORDER — QUETIAPINE FUMARATE 100 MG/1
100 TABLET, FILM COATED ORAL NIGHTLY
Status: DISCONTINUED | OUTPATIENT
Start: 2018-06-08 | End: 2018-06-12 | Stop reason: HOSPADM

## 2018-06-08 RX ORDER — BUPROPION HYDROCHLORIDE 300 MG/1
300 TABLET ORAL DAILY
COMMUNITY
End: 2018-07-18

## 2018-06-08 RX ORDER — RAMELTEON 8 MG/1
8 TABLET ORAL NIGHTLY PRN
Status: DISCONTINUED | OUTPATIENT
Start: 2018-06-08 | End: 2018-06-12 | Stop reason: HOSPADM

## 2018-06-08 RX ORDER — BUTALBITAL, ASPIRIN, AND CAFFEINE 325; 50; 40 MG/1; MG/1; MG/1
1 CAPSULE ORAL 3 TIMES DAILY PRN
Status: DISCONTINUED | OUTPATIENT
Start: 2018-06-08 | End: 2018-06-12 | Stop reason: HOSPADM

## 2018-06-08 RX ORDER — FUROSEMIDE 40 MG/1
40 TABLET ORAL 2 TIMES DAILY
Status: DISCONTINUED | OUTPATIENT
Start: 2018-06-08 | End: 2018-06-11

## 2018-06-08 RX ORDER — ENOXAPARIN SODIUM 100 MG/ML
40 INJECTION SUBCUTANEOUS EVERY 12 HOURS
Status: DISCONTINUED | OUTPATIENT
Start: 2018-06-08 | End: 2018-06-12 | Stop reason: HOSPADM

## 2018-06-08 RX ORDER — CLOPIDOGREL BISULFATE 75 MG/1
75 TABLET ORAL DAILY
COMMUNITY
End: 2019-08-12 | Stop reason: CLARIF

## 2018-06-08 RX ORDER — FAMOTIDINE 20 MG/1
20 TABLET, FILM COATED ORAL DAILY
Status: DISCONTINUED | OUTPATIENT
Start: 2018-06-08 | End: 2018-06-12 | Stop reason: HOSPADM

## 2018-06-08 RX ORDER — CLOPIDOGREL BISULFATE 75 MG/1
75 TABLET ORAL DAILY
Status: DISCONTINUED | OUTPATIENT
Start: 2018-06-08 | End: 2018-06-12 | Stop reason: HOSPADM

## 2018-06-08 RX ORDER — HYDROCODONE BITARTRATE AND ACETAMINOPHEN 10; 325 MG/1; MG/1
2 TABLET ORAL EVERY 4 HOURS PRN
Status: DISCONTINUED | OUTPATIENT
Start: 2018-06-08 | End: 2018-06-08

## 2018-06-08 RX ORDER — FAMOTIDINE 20 MG/1
20 TABLET, FILM COATED ORAL 2 TIMES DAILY
Status: DISCONTINUED | OUTPATIENT
Start: 2018-06-08 | End: 2018-06-08

## 2018-06-08 RX ORDER — BUTALBITAL, ASPIRIN, AND CAFFEINE 325; 50; 40 MG/1; MG/1; MG/1
1 CAPSULE ORAL 3 TIMES DAILY PRN
COMMUNITY
End: 2019-02-28

## 2018-06-08 RX ORDER — ATORVASTATIN CALCIUM 40 MG/1
80 TABLET, FILM COATED ORAL NIGHTLY
Status: DISCONTINUED | OUTPATIENT
Start: 2018-06-08 | End: 2018-06-12 | Stop reason: HOSPADM

## 2018-06-08 RX ORDER — POTASSIUM CHLORIDE 20 MEQ/1
40 TABLET, EXTENDED RELEASE ORAL DAILY
Status: DISCONTINUED | OUTPATIENT
Start: 2018-06-08 | End: 2018-06-12 | Stop reason: HOSPADM

## 2018-06-08 RX ORDER — ACETAMINOPHEN 325 MG/1
650 TABLET ORAL EVERY 4 HOURS PRN
Status: DISCONTINUED | OUTPATIENT
Start: 2018-06-08 | End: 2018-06-12 | Stop reason: HOSPADM

## 2018-06-08 RX ORDER — ASPIRIN 81 MG/1
81 TABLET ORAL DAILY
Status: DISCONTINUED | OUTPATIENT
Start: 2018-06-08 | End: 2018-06-12 | Stop reason: HOSPADM

## 2018-06-08 RX ORDER — SODIUM CHLORIDE 0.9 % (FLUSH) 0.9 %
5 SYRINGE (ML) INJECTION
Status: DISCONTINUED | OUTPATIENT
Start: 2018-06-08 | End: 2018-06-12 | Stop reason: HOSPADM

## 2018-06-08 RX ORDER — TIZANIDINE 4 MG/1
4 TABLET ORAL EVERY 8 HOURS PRN
Status: DISCONTINUED | OUTPATIENT
Start: 2018-06-08 | End: 2018-06-12 | Stop reason: HOSPADM

## 2018-06-08 RX ORDER — OXYBUTYNIN CHLORIDE 5 MG/1
5 TABLET, EXTENDED RELEASE ORAL DAILY
Status: DISCONTINUED | OUTPATIENT
Start: 2018-06-08 | End: 2018-06-12 | Stop reason: HOSPADM

## 2018-06-08 RX ORDER — TRAZODONE HYDROCHLORIDE 100 MG/1
200 TABLET ORAL NIGHTLY
Status: DISCONTINUED | OUTPATIENT
Start: 2018-06-08 | End: 2018-06-12 | Stop reason: HOSPADM

## 2018-06-08 RX ORDER — BUPROPION HYDROCHLORIDE 150 MG/1
300 TABLET ORAL DAILY
Status: DISCONTINUED | OUTPATIENT
Start: 2018-06-08 | End: 2018-06-12 | Stop reason: HOSPADM

## 2018-06-08 RX ORDER — OXYBUTYNIN CHLORIDE 15 MG/1
5 TABLET, EXTENDED RELEASE ORAL DAILY
COMMUNITY
End: 2018-12-06 | Stop reason: SDUPTHER

## 2018-06-08 RX ADMIN — MORPHINE SULFATE 2 MG: 4 INJECTION INTRAVENOUS at 04:06

## 2018-06-08 RX ADMIN — LACTOBACILLUS TAB 1 TABLET: TAB at 04:06

## 2018-06-08 RX ADMIN — ONDANSETRON 4 MG: 2 INJECTION INTRAMUSCULAR; INTRAVENOUS at 01:06

## 2018-06-08 RX ADMIN — HYDROXYZINE HYDROCHLORIDE 25 MG: 25 TABLET, FILM COATED ORAL at 05:06

## 2018-06-08 RX ADMIN — ASPIRIN 81 MG: 81 TABLET, COATED ORAL at 08:06

## 2018-06-08 RX ADMIN — OXYBUTYNIN CHLORIDE 5 MG: 5 TABLET, EXTENDED RELEASE ORAL at 08:06

## 2018-06-08 RX ADMIN — BUPROPION HYDROCHLORIDE 300 MG: 150 TABLET, FILM COATED, EXTENDED RELEASE ORAL at 08:06

## 2018-06-08 RX ADMIN — TRAZODONE HYDROCHLORIDE 200 MG: 100 TABLET ORAL at 03:06

## 2018-06-08 RX ADMIN — LEVOTHYROXINE SODIUM 125 MCG: 25 TABLET ORAL at 05:06

## 2018-06-08 RX ADMIN — CEFTRIAXONE 1 G: 1 INJECTION, POWDER, FOR SOLUTION INTRAMUSCULAR; INTRAVENOUS at 10:06

## 2018-06-08 RX ADMIN — QUETIAPINE FUMARATE 100 MG: 100 TABLET ORAL at 09:06

## 2018-06-08 RX ADMIN — TIZANIDINE 4 MG: 4 TABLET ORAL at 11:06

## 2018-06-08 RX ADMIN — HYDROCODONE BITARTRATE AND ACETAMINOPHEN 1 TABLET: 10; 325 TABLET ORAL at 06:06

## 2018-06-08 RX ADMIN — HYDROCODONE BITARTRATE AND ACETAMINOPHEN 1 TABLET: 10; 325 TABLET ORAL at 03:06

## 2018-06-08 RX ADMIN — PANTOPRAZOLE SODIUM 40 MG: 40 TABLET, DELAYED RELEASE ORAL at 08:06

## 2018-06-08 RX ADMIN — QUETIAPINE FUMARATE 100 MG: 100 TABLET ORAL at 03:06

## 2018-06-08 RX ADMIN — FAMOTIDINE 20 MG: 20 TABLET ORAL at 08:06

## 2018-06-08 RX ADMIN — FUROSEMIDE 40 MG: 40 TABLET ORAL at 08:06

## 2018-06-08 RX ADMIN — HYDROCODONE BITARTRATE AND ACETAMINOPHEN 1 TABLET: 10; 325 TABLET ORAL at 11:06

## 2018-06-08 RX ADMIN — POTASSIUM CHLORIDE 40 MEQ: 1500 TABLET, EXTENDED RELEASE ORAL at 08:06

## 2018-06-08 RX ADMIN — LACTOBACILLUS TAB 1 TABLET: TAB at 08:06

## 2018-06-08 RX ADMIN — HYDROCODONE BITARTRATE AND ACETAMINOPHEN 1 TABLET: 10; 325 TABLET ORAL at 09:06

## 2018-06-08 RX ADMIN — ENOXAPARIN SODIUM 40 MG: 100 INJECTION SUBCUTANEOUS at 08:06

## 2018-06-08 RX ADMIN — ATORVASTATIN CALCIUM 80 MG: 40 TABLET, FILM COATED ORAL at 03:06

## 2018-06-08 RX ADMIN — FLUOXETINE 60 MG: 20 CAPSULE ORAL at 08:06

## 2018-06-08 RX ADMIN — LACTOBACILLUS TAB 1 TABLET: TAB at 11:06

## 2018-06-08 RX ADMIN — ATORVASTATIN CALCIUM 80 MG: 40 TABLET, FILM COATED ORAL at 08:06

## 2018-06-08 RX ADMIN — CLOPIDOGREL BISULFATE 75 MG: 75 TABLET ORAL at 08:06

## 2018-06-08 RX ADMIN — HYDROCODONE BITARTRATE AND ACETAMINOPHEN 1 TABLET: 10; 325 TABLET ORAL at 02:06

## 2018-06-08 RX ADMIN — TRAZODONE HYDROCHLORIDE 200 MG: 100 TABLET ORAL at 09:06

## 2018-06-08 NOTE — ASSESSMENT & PLAN NOTE
Improving. Wearing 2 walking boots  --Continue home furosemide  40 mg BID and Potassium 20 mequ daily

## 2018-06-08 NOTE — PLAN OF CARE
TN went to meet with patient, asleep at this time. Will follow-up.    Effie Mcgill RN  Transition Navigator  [phone]

## 2018-06-08 NOTE — SUBJECTIVE & OBJECTIVE
Past Medical History:   Diagnosis Date    Anticoagulant long-term use     Anxiety     Arthritis     Bilateral lower extremity edema     severe chronic    Carotid artery occlusion     Cataract     Depression     Fever blister     Hypothyroid     Iron deficiency anemia     Lumbar radiculopathy     with chronic pain    Ocular migraine     Sleep apnea     cpap       Past Surgical History:   Procedure Laterality Date    BACK SURGERY      x 12    CATARACT EXTRACTION W/  INTRAOCULAR LENS IMPLANT Left     Dr Coleman     ESOPHAGOGASTRODUODENOSCOPY N/A 5/23/2018    Procedure: ESOPHAGOGASTRODUODENOSCOPY (EGD);  Surgeon: Prince Vance MD;  Location: [address];  Service: Endoscopy;  Laterality: N/A;  r/s 'd per Dr. Vance due to family emergency- ER    HYSTERECTOMY  1975    endometriosis    pain pump placement      SQ Dilaudid Pump managed by Dr. Hillman, Pain Management    SPINAL CORD STIMULATOR REMOVAL      before [facility]issa    SPINE SURGERY  5-13-13    CERVICAL FUSION       Review of patient's allergies indicates:   Allergen Reactions    (d)-limonene flavor      Other reaction(s): difficult intubation  Other reaction(s): Difficulty breathing    Bactrim [sulfamethoxazole-trimethoprim] Anaphylaxis    Imitrex [sumatriptan succinate] Shortness Of Breath    Penicillins Shortness Of Breath     Other reaction(s): Jittery    Topamax [topiramate] Shortness Of Breath    Vancomycin Shortness Of Breath     Rash    Butorphanol tartrate     Darvocet a500 [propoxyphene n-acetaminophen]      Other reaction(s): Difficulty breathing    Fentanyl      Other reaction(s): Vomiting  Other reaction(s): Nausea  Other reaction(s): Itching  swelling    White petrolatum-zinc     Zinc oxide-white petrolatum      Other reaction(s): Difficulty breathing    Latex, natural rubber Itching and Rash       No current facility-administered medications on file prior to encounter.      Current Outpatient Prescriptions on  File Prior to Encounter   Medication Sig    atorvastatin (LIPITOR) 80 MG tablet TAKE ONE TABLET BY MOUTH EVERY DAY. (Patient taking differently: TAKE ONE TABLET BY MOUTH Nightly)    furosemide (LASIX) 40 MG tablet Take 1 tablet (40 mg total) by mouth 2 (two) times daily.    levothyroxine (SYNTHROID) 125 MCG tablet Take 1 tablet (125 mcg total) by mouth before breakfast.    pantoprazole (PROTONIX) 40 MG tablet Take 1 tablet (40 mg total) by mouth once daily.    tiZANidine (ZANAFLEX) 4 MG tablet     aspirin (ECOTRIN) 81 MG EC tablet Take 1 tablet (81 mg total) by mouth once daily.    DOC-Q-LACE 100 mg capsule TAKE ONE CAPSULE BY MOUTH THREE TIMES DAILY AS NEEDED FOR CONSTIPATION    docosanol (ABREVA) 10 % Crea Apply 1 application topically 2 (two) times daily.    FLUoxetine (PROZAC) 20 MG capsule Take 3 capsules (60 mg total) by mouth once daily.    hydrocodone-acetaminophen 10-325mg (NORCO)  mg Tab Take 2 tablets by mouth every 4 (four) hours as needed for Pain.     Lactobacillus acidophilus (PROBIOTIC) 10 billion cell Cap Take 1 capsule by mouth once daily.    ondansetron (ZOFRAN) 8 MG tablet TAKE ONE TABLET BY MOUTH EVERY TWELVE HOURS AS NEEDED FOR NAUSEA    ondansetron (ZOFRAN-ODT) 4 MG TbDL Take 1 tablet (4 mg total) by mouth every 8 (eight) hours as needed.    potassium chloride (KLOR-CON) 10 MEQ TbSR     potassium chloride SA (K-DUR,KLOR-CON) 20 MEQ tablet TAKE THREE TABLETS BY MOUTH DAILY    potassium citrate (UROCIT-K) 5 mEq (540 mg) TbSR Take 1 tablet (5 mEq total) by mouth 3 (three) times daily with meals.    QUEtiapine (SEROQUEL) 100 MG Tab     ranitidine (ZANTAC) 150 MG capsule Take 1 capsule (150 mg total) by mouth 2 (two) times daily.    SENNOSIDES (SENNA LAX ORAL) Take 20 mg by mouth every evening.     traZODone (DESYREL) 100 MG tablet Take 2 tablets (200 mg total) by mouth every evening.    [DISCONTINUED] lamotrigine (LAMICTAL) 25 MG tablet Take 1 tablet (25 mg total) by  mouth once daily.     Family History     Problem Relation (Age of Onset)    Cancer Mother (55), Father    Esophageal cancer Father    Heart disease Maternal Uncle    No Known Problems Brother, Brother, Sister, Maternal Aunt, Paternal Aunt, Paternal Uncle, Maternal Grandfather, Paternal Grandmother, Paternal Grandfather    Parkinsonism Maternal Grandmother    Tremor Maternal Grandmother        Social History Main Topics    Smoking status: Never Smoker    Smokeless tobacco: Never Used    Alcohol use No      Comment: denies    Drug use: No    Sexual activity: No     Review of Systems   Constitutional: Negative for chills, diaphoresis and fever.   HENT: Negative for sore throat and trouble swallowing.    Eyes: Negative for photophobia and visual disturbance.   Respiratory: Negative for cough, shortness of breath and wheezing.    Cardiovascular: Negative for chest pain and palpitations.   Gastrointestinal: Positive for abdominal pain (Bilateral Lower Quadrant), nausea and vomiting. Negative for constipation and diarrhea.   Endocrine: Negative for polydipsia and polyphagia.   Genitourinary: Negative for decreased urine volume, dysuria, hematuria and urgency.   Musculoskeletal: Positive for arthralgias, back pain, myalgias and neck pain. Negative for joint swelling and neck stiffness.   Neurological: Negative for weakness, numbness and headaches.   Psychiatric/Behavioral: Positive for dysphoric mood. Negative for agitation and suicidal ideas.     Objective:     Vital Signs (Most Recent):  Temp: 98.4 °F (36.9 °C) (06/07/18 2112)  Pulse: 61 (06/07/18 2300)  Resp: 18 (06/07/18 2300)  BP: (!) 100/49 (06/07/18 2300)  SpO2: 99 % (06/07/18 2300) Vital Signs (24h Range):  Temp:  [98.4 °F (36.9 °C)] 98.4 °F (36.9 °C)  Pulse:  [61-66] 61  Resp:  [18-20] 18  SpO2:  [97 %-99 %] 99 %  BP: (100-105)/(49-65) 100/49     Weight: 69.4 kg (153 lb)  Body mass index is 23.96 kg/m².    Physical Exam   Constitutional: She is oriented to  person, place, and time. She has a sickly appearance. No distress.   HENT:   Head: Normocephalic and atraumatic.   Mouth/Throat: No oropharyngeal exudate.   Oropharynx Dry   Eyes: Conjunctivae are normal. Pupils are equal, round, and reactive to light. No scleral icterus.   Neck: Neck supple.   Cardiovascular: Normal rate, regular rhythm, normal heart sounds and intact distal pulses.  Exam reveals no gallop and no friction rub.    No murmur heard.  Pulmonary/Chest: Effort normal and breath sounds normal. No respiratory distress. She has no wheezes. She has no rales.   Abdominal: Soft. Bowel sounds are normal. She exhibits no distension. There is tenderness (Bilateral Lower Quadants). There is no rebound and no guarding.   Genitourinary:   Genitourinary Comments: Supra-Pubic Catheter with Clean Dressing   Musculoskeletal: She exhibits no edema, tenderness or deformity.   Neurological: She is alert and oriented to person, place, and time. She exhibits normal muscle tone.   Skin: Skin is warm and dry. Capillary refill takes less than 2 seconds. No rash noted. She is not diaphoretic.   Psychiatric: She has a normal mood and affect. Her behavior is normal.   Nursing note and vitals reviewed.        CRANIAL NERVES     CN III, IV, VI   Pupils are equal, round, and reactive to light.       Significant Labs:   CBC:   Recent Labs  Lab 06/07/18  2250   WBC 10.58   HGB 12.4   HCT 39.0        CMP:   Recent Labs  Lab 06/07/18  2250 06/08/18  0343   * 135*   K 3.7 3.5   CL 96 100   CO2 30* 25   GLU 91 97   BUN 18 15   CREATININE 1.5* 1.2   CALCIUM 9.8 9.1   PROT 7.7  --    ALBUMIN 4.1  --    BILITOT 0.5  --    ALKPHOS 131  --    AST 16  --    ALT 10  --    ANIONGAP 8 10   EGFRNONAA 37* 49*     Lactic Acid:   Recent Labs  Lab 06/08/18  0132   LACTATE 1.6     TSH:   Recent Labs  Lab 06/08/18  0343   TSH 1.225     Urine Studies:   Recent Labs  Lab 06/07/18  2251   COLORU Yellow   APPEARANCEUA Cloudy*   PHUR 8.0    SPECGRAV 1.010   PROTEINUA 2+*   GLUCUA Negative   KETONESU Negative   BILIRUBINUA Negative   OCCULTUA 3+*   NITRITE Positive*   UROBILINOGEN Negative   LEUKOCYTESUR 3+*   RBCUA >100*   WBCUA 100*   BACTERIA Moderate*   SQUAMEPITHEL 3   HYALINECASTS 0       Significant Imaging:  No New

## 2018-06-08 NOTE — ASSESSMENT & PLAN NOTE
Neurogenic bladder  Suprapubic catheter  63 yo female with suprapubic catheter, last changed by home health 5/25/18 with  recurrent PROTEUS MIRABILIS urinary tract infections (resistant to cipro). Urine today with + Nitrites, RBC > 100,  with clumps and moderate bacteria. Patient states that there was purulent drainage around insertion site before she changed it.   --Started on Ceftriaxone  --Follow Urine cultre  --Urology consult to change meadows and pull new culture

## 2018-06-08 NOTE — ASSESSMENT & PLAN NOTE
Bilateral carotid artery disease  Coronary artery disease involving native coronary artery of native heart without angina pectoris  8/21/17 Echo, Normal EF + DD  Continue home aspirin 81 mg daily, clopidogrel 75 mg daily

## 2018-06-08 NOTE — ASSESSMENT & PLAN NOTE
62 y.o. female with incomplete emptying managed with suprapubic tube exchanges monthly    Plan:  1. Suprapubic tube exchanged without any difficulty, 20 [place] with 30cc in balloon per patient request.   2. New urine sample from suprapubic tube sent for culture. Would recommend to target antibiotic treatment based off of these results rather than the one collected from the previous indwelling suprapubic tube which is likely catheterized.  3. Followup with Dr. Mayo her urologist PRN. She has home health suprapubic tube exchanges monthly.

## 2018-06-08 NOTE — ASSESSMENT & PLAN NOTE
Major depressive disorder, recurrent, mild  Continue home Trazadone 200 mg daily, Fluoxetine 60 mg daily, bupropion 24 hr 300 mg daily

## 2018-06-08 NOTE — ED PROVIDER NOTES
Encounter Date: 6/7/2018    SCRIBE #1 NOTE: I, Romina Leung, am scribing for, and in the presence of,  Dr. Maurice. I have scribed the entire note.       History     Chief Complaint   Patient presents with    Urinary Tract Infection     pt arrived with spouse per whch and reports sent from urgent care for abd pain vomiting and uti and bloody urine in catheter; pt reports was given zofran at urgent care    Emesis    Abdominal Pain     Tasha Hawley is a 62 y.o. female who  has a past medical history of Anticoagulant long-term use; Anxiety; Arthritis; Bilateral lower extremity edema; Carotid artery occlusion; Cataract; Depression; Fever blister; Hypothyroid; Iron deficiency anemia; Lumbar radiculopathy; Ocular migraine; and Sleep apnea.    The patient presents to the ED due to vomiting. She reports onset of symptoms was this morning. The patient notes she has been having multiple episodes of vomiting. She states she has been unable to retain any liquids or solids. The patient notes associated abdominal pain but denies any blood in vomit, fever or chills. As per spouse the patient had episodes of diarrhea yesterday but none today. The patient does note she was seen at an urgent care today for symptoms. At which time she was told she had an UTI and referred to the ED for further evaluation. She admits to having blood in urine of catheter bag.       The history is provided by the patient.     Review of patient's allergies indicates:   Allergen Reactions    (d)-limonene flavor      Other reaction(s): difficult intubation  Other reaction(s): Difficulty breathing    Bactrim [sulfamethoxazole-trimethoprim] Anaphylaxis    Imitrex [sumatriptan succinate] Shortness Of Breath    Penicillins Shortness Of Breath     Other reaction(s): Jittery    Topamax [topiramate] Shortness Of Breath    Vancomycin Shortness Of Breath     Rash    Butorphanol tartrate     Darvocet a500 [propoxyphene n-acetaminophen]      Other  reaction(s): Difficulty breathing    Fentanyl      Other reaction(s): Vomiting  Other reaction(s): Nausea  Other reaction(s): Itching  swelling    White petrolatum-zinc     Zinc oxide-white petrolatum      Other reaction(s): Difficulty breathing    Latex, natural rubber Itching and Rash     Past Medical History:   Diagnosis Date    Anticoagulant long-term use     Anxiety     Arthritis     Bilateral lower extremity edema     severe chronic    Carotid artery occlusion     Cataract     Depression     Fever blister     Hypothyroid     Iron deficiency anemia     Lumbar radiculopathy     with chronic pain    Ocular migraine     Sleep apnea     cpap     Past Surgical History:   Procedure Laterality Date    BACK SURGERY      x 12    CATARACT EXTRACTION W/  INTRAOCULAR LENS IMPLANT Left     Dr Coleman     ESOPHAGOGASTRODUODENOSCOPY N/A 5/23/2018    Procedure: ESOPHAGOGASTRODUODENOSCOPY (EGD);  Surgeon: Prince Vance MD;  Location: [facility] ([address] [place] [address]);  Service: Endoscopy;  Laterality: N/A;  r/s 'd per Dr. Vance due to family emergency- ER    HYSTERECTOMY  1975    endometriosis    pain pump placement      SQ Dilaudid Pump managed by Dr. Hillman, Pain Management    SPINAL CORD STIMULATOR REMOVAL      before Larissa    SPINE SURGERY  5-13-13    CERVICAL FUSION     Family History   Problem Relation Age of Onset    Cancer Mother 55        breast    Cancer Father         esophagus,had laryngectomy    Esophageal cancer Father     Parkinsonism Maternal Grandmother     Tremor Maternal Grandmother     No Known Problems Brother     No Known Problems Brother     Heart disease Maternal Uncle     No Known Problems Sister     No Known Problems Maternal Aunt     No Known Problems Paternal Aunt     No Known Problems Paternal Uncle     No Known Problems Maternal Grandfather     No Known Problems Paternal Grandmother     No Known Problems Paternal Grandfather     Melanoma Neg Hx     Amblyopia Neg  Hx     Blindness Neg Hx     Cataracts Neg Hx     Diabetes Neg Hx     Glaucoma Neg Hx     Hypertension Neg Hx     Macular degeneration Neg Hx     Retinal detachment Neg Hx     Strabismus Neg Hx     Stroke Neg Hx     Thyroid disease Neg Hx     Colon cancer Neg Hx      Social History   Substance Use Topics    Smoking status: Never Smoker    Smokeless tobacco: Never Used    Alcohol use No      Comment: denies     Review of Systems   Gastrointestinal: Positive for abdominal pain, nausea and vomiting.   Genitourinary: Positive for hematuria.   All other systems reviewed and are negative.      Physical Exam     Initial Vitals [06/07/18 2112]   BP Pulse Resp Temp SpO2   (!) 104/59 65 20 98.4 °F (36.9 °C) 97 %      MAP       74         Physical Exam    Nursing note and vitals reviewed.  Constitutional: She appears well-developed and well-nourished. She is not diaphoretic. No distress.   HENT:   Head: Normocephalic and atraumatic.   Mouth/Throat: Oropharynx is clear and moist.   Eyes: Conjunctivae and EOM are normal. Pupils are equal, round, and reactive to light.   Neck: Normal range of motion. Neck supple.   Cardiovascular: Normal rate, regular rhythm and normal heart sounds. Exam reveals no gallop and no friction rub.    No murmur heard.  Pulmonary/Chest: Breath sounds normal. She has no wheezes. She has no rhonchi. She has no rales.   Abdominal: Soft. Bowel sounds are normal. There is tenderness. There is no rebound and no guarding.   Diffuse abdominal tenderness   Musculoskeletal: Normal range of motion. She exhibits no edema or tenderness.   Lymphadenopathy:     She has no cervical adenopathy.   Neurological: She is alert and oriented to person, place, and time. She has normal strength.   Skin: Skin is warm and dry. Capillary refill takes less than 2 seconds. No rash noted.         ED Course   Procedures  Labs Reviewed   CBC W/ AUTO DIFFERENTIAL - Abnormal; Notable for the following:        Result Value     MCH 26.6 (*)     MCHC 31.8 (*)     All other components within normal limits   COMPREHENSIVE METABOLIC PANEL - Abnormal; Notable for the following:     Sodium 134 (*)     CO2 30 (*)     Creatinine 1.5 (*)     eGFR if  43 (*)     eGFR if non  37 (*)     All other components within normal limits   URINALYSIS - Abnormal; Notable for the following:     Appearance, UA Cloudy (*)     Protein, UA 2+ (*)     Occult Blood UA 3+ (*)     Nitrite, UA Positive (*)     Leukocytes, UA 3+ (*)     All other components within normal limits   URINALYSIS MICROSCOPIC - Abnormal; Notable for the following:     RBC, UA >100 (*)     WBC,  (*)     WBC Clumps, UA Occasional (*)     Bacteria, UA Moderate (*)     All other components within normal limits   CULTURE, URINE   LIPASE   LACTIC ACID, PLASMA          No orders to display        Medical Decision Making:   ED Management:  1:22 AM Case discussed with Bill NP-Hospitalist, will come to evaluate and admit the patient.     Pt has uti with vomiting, likely pyelonephritis. Will place pt in obs for ivf and abx              Attending Attestation:           Physician Attestation for Scribe:  Physician Attestation Statement for Scribe #1: I, Kalpesh Maurice, reviewed documentation, as scribed by Romina Leung in my presence, and it is both accurate and complete.                    Clinical Impression:     1. Pyelonephritis    2. Vomiting, intractability of vomiting not specified, presence of nausea not specified, unspecified vomiting type    3. Acute kidney injury        Disposition:   Disposition: Placed in Observation  Condition: Stable                      Kalpesh Maurice MD  06/08/18 0136     WDL

## 2018-06-08 NOTE — ASSESSMENT & PLAN NOTE
Cervical myelopathy  Degenerative disc disease, lumbar  S/P insertion of spinal cord stimulator  S/P insertion of intrathecal pump (Continious Dilaudid Infusion)  Unsure of Dilaudid infusion dose.  Continue home medications:  --PRN tizanidine 4 mg TID prn, Hydrocodone-APAP  mg every 4 hours PRN

## 2018-06-08 NOTE — PLAN OF CARE
TN went to meet with patient, no family at bedside.  Patient lives with her  at home. She has home health with [facility] (). She has DME at home (wheelchair, walker, scooter, and cane). She does not drive, but family will transport home. She also has a subrapubic cath in place. TN will continue to follow. Patient prefers afternoon appointments.    Future Appointments  Date Time Provider Department Center   6/29/2018 1:30 PM Qamar Hope OD [facility] [place]   6/29/2018 2:00 PM Qamar Hope OD [facility] [place]   7/18/2018 9:30 AM Erik Oconnor MD [facility]   7/18/2018 10:00 AM Analilia Mercado, PhD [facility]        06/08/18 7022   Discharge Assessment   Assessment Type Discharge Planning Assessment   Confirmed/corrected address and phone number on facesheet? Yes   Assessment information obtained from? Patient   Prior to hospitilization cognitive status: Alert/Oriented   Prior to hospitalization functional status: Assistive Equipment   Current cognitive status: Alert/Oriented   Current Functional Status: Assistive Equipment   Facility Arrived From: HOME WITH HOME HEALTH   Lives With spouse   Able to Return to Prior Arrangements yes   Is patient able to care for self after discharge? Yes   Who are your caregiver(s) and their phone number(s)? Dangelo Hawley Spouse [phone]    Patient's perception of discharge disposition home health   Readmission Within The Last 30 Days no previous admission in last 30 days   Equipment Currently Used at Home cane, straight;walker, rolling;wheelchair;other (see comments)  (SCOOTER)   Do you have any problems affording any of your prescribed medications? TBD   Discharge Plan A Home with family;Home Health   Discharge Plan B Home with family   Patient/Family In Agreement With Plan yes     Effie Mcgill RN  Transition Navigator  [phone]

## 2018-06-08 NOTE — SUBJECTIVE & OBJECTIVE
Past Medical History:   Diagnosis Date    Anticoagulant long-term use     Anxiety     Arthritis     Bilateral lower extremity edema     severe chronic    Carotid artery occlusion     Cataract     Depression     Fever blister     Hypothyroid     Iron deficiency anemia     Lumbar radiculopathy     with chronic pain    Ocular migraine     Sleep apnea     cpap       Past Surgical History:   Procedure Laterality Date    BACK SURGERY      x 12    CATARACT EXTRACTION W/  INTRAOCULAR LENS IMPLANT Left     Dr Coleman     ESOPHAGOGASTRODUODENOSCOPY N/A 5/23/2018    Procedure: ESOPHAGOGASTRODUODENOSCOPY (EGD);  Surgeon: Prince Vance MD;  Location: [address];  Service: Endoscopy;  Laterality: N/A;  r/s 'd per Dr. Vance due to family emergency- ER    HYSTERECTOMY  1975    endometriosis    pain pump placement      SQ Dilaudid Pump managed by Dr. Hillman, Pain Management    SPINAL CORD STIMULATOR REMOVAL      before [place]    SPINE SURGERY  5-13-13    CERVICAL FUSION       Review of patient's allergies indicates:   Allergen Reactions    (d)-limonene flavor      Other reaction(s): difficult intubation  Other reaction(s): Difficulty breathing    Bactrim [sulfamethoxazole-trimethoprim] Anaphylaxis    Benadryl [diphenhydramine hcl] Shortness Of Breath    Imitrex [sumatriptan succinate] Shortness Of Breath    Penicillins Shortness Of Breath     Other reaction(s): Jittery    Topamax [topiramate] Shortness Of Breath    Vancomycin Shortness Of Breath     Rash    Butorphanol tartrate     Darvocet a500 [propoxyphene n-acetaminophen]      Other reaction(s): Difficulty breathing    Fentanyl      Other reaction(s): Vomiting  Other reaction(s): Nausea  Other reaction(s): Itching  swelling    White petrolatum-zinc     Zinc oxide-white petrolatum      Other reaction(s): Difficulty breathing    Latex, natural rubber Itching and Rash       Family History     Problem Relation (Age of Onset)     Cancer Mother (55), Father    Esophageal cancer Father    Heart disease Maternal Uncle    No Known Problems Brother, Brother, Sister, Maternal Aunt, Paternal Aunt, Paternal Uncle, Maternal Grandfather, Paternal Grandmother, Paternal Grandfather    Parkinsonism Maternal Grandmother    Tremor Maternal Grandmother          Social History Main Topics    Smoking status: Never Smoker    Smokeless tobacco: Never Used    Alcohol use No      Comment: denies    Drug use: No    Sexual activity: No       Review of Systems   Constitutional: Negative for diaphoresis.   HENT: Negative for facial swelling.    Eyes: Negative for visual disturbance.   Respiratory: Negative for shortness of breath.    Cardiovascular: Negative for chest pain.   Gastrointestinal: Negative for abdominal distention.   Musculoskeletal: Negative for gait problem.   Skin: Negative for color change.   Neurological: Negative for speech difficulty.   Hematological: Does not bruise/bleed easily.   Psychiatric/Behavioral: Negative for agitation and behavioral problems.       Objective:     Temp:  [97.3 °F (36.3 °C)-98.4 °F (36.9 °C)] 97.3 °F (36.3 °C)  Pulse:  [54-66] 58  Resp:  [18-20] 20  SpO2:  [96 %-100 %] 96 %  BP: ()/(37-70) 100/65     Body mass index is 23.96 kg/m².      Date 06/08/18 0700 - 06/09/18 0659   Shift 5472-7076 6863-2574 7616-6998 24 Hour Total   I  N  T  A  K  E   P.O. 280   280    Shift Total  (mL/kg) 280  (4)   280  (4)   O  U  T  P  U  T   Shift Total  (mL/kg)       Weight (kg) 69.4 69.4 69.4 69.4          Drains     Drain                 Urethral Catheter -- days                Physical Exam   Constitutional: She is oriented to person, place, and time. She appears well-developed and well-nourished.   HENT:   Head: Normocephalic and atraumatic.   Eyes: EOM are normal. Pupils are equal, round, and reactive to light.   Neck: Normal range of motion. Neck supple.   Cardiovascular: Intact distal pulses.    Pulmonary/Chest: Effort  normal. No respiratory distress.   Abdominal: Soft. There is no tenderness.   20 Indonesian suprapubic tube indwelling - deflated fluid from balloon (about 25cc of fluid) and the suprapubic tube was removed.    The suprapubic tube site was prepped with betadyne and a new 20 Indonesian suprapubic tube was inserted and inflated with 30cc of sterile saline per the patient's request. A new urine sample was collected through the new suprapubic tube and will be sent off for culture.    Musculoskeletal: Normal range of motion. She exhibits no edema.   Neurological: She is alert and oriented to person, place, and time.   Skin: Skin is warm and dry.     Psychiatric: She has a normal mood and affect. Her behavior is normal.       Significant Labs:    BMP:    Recent Labs  Lab 06/07/18  2250 06/08/18  0343   * 135*   K 3.7 3.5   CL 96 100   CO2 30* 25   BUN 18 15   CREATININE 1.5* 1.2   CALCIUM 9.8 9.1       CBC:    Recent Labs  Lab 06/07/18  2250   WBC 10.58   HGB 12.4   HCT 39.0          All pertinent labs results from the past 24 hours have been reviewed.  Recent Lab Results       06/08/18  0343 06/08/18  0132 06/07/18  2251 06/07/18  2250      Albumin    4.1     Alkaline Phosphatase    131     ALT    10     Anion Gap 10   8     Appearance, UA   Cloudy(A)      AST    16     Bacteria, UA   Moderate(A)      Baso #    0.02     Basophil%    0.2     Bilirubin (UA)   Negative      Total Bilirubin    0.5  Comment:  For infants and newborns, interpretation of results should be based  on gestational age, weight and in agreement with clinical  observations.  Premature Infant recommended reference ranges:  Up to 24 hours.............<8.0 mg/dL  Up to 48 hours............<12.0 mg/dL  3-5 days..................<15.0 mg/dL  6-29 days.................<15.0 mg/dL       BUN, Bld 15   18     Calcium 9.1   9.8     Chloride 100   96     CO2 25   30(H)     Color, UA   Yellow      Creatinine 1.2   1.5(H)     Differential Method    Automated      eGFR if  56(A)   43(A)     eGFR if non  49  Comment:  Calculation used to obtain the estimated glomerular filtration  rate (eGFR) is the CKD-EPI equation.   (A)   37  Comment:  Calculation used to obtain the estimated glomerular filtration  rate (eGFR) is the CKD-EPI equation.   (A)     Eos #    0.1     Eosinophil%    1.1     Glucose 97   91     Glucose, UA   Negative      Gran # (ANC)    7.3     Gran%    68.7     Hematocrit    39.0     Hemoglobin    12.4     Hyaline Casts, UA   0      Ketones, UA   Negative      Lactate, Tho  1.6  Comment:  Falsely low lactic acid results can be found in samples   containing >=13.0 mg/dL total bilirubin and/or >=3.5 mg/dL   direct bilirubin.         Leukocytes, UA   3+(A)      Lipase    34     Lymph #    2.5     Lymph%    23.8     Magnesium 1.9        MCH    26.6(L)     MCHC    31.8(L)     MCV    84     Microscopic Comment   SEE COMMENT  Comment:  Other formed elements not mentioned in the report are not   present in the microscopic examination.         Mono #    0.6     Mono%    6.0     MPV    9.7     Nitrite, UA   Positive(A)      Occult Blood UA   3+(A)      pH, UA   8.0      Platelets    216     Potassium 3.5   3.7     Total Protein    7.7     Protein, UA   2+  Comment:  Recommend a 24 hour urine protein or a urine   protein/creatinine ratio if globulin induced proteinuria is  clinically suspected.  (A)      RBC    4.66     RBC, UA   >100(H)      RDW    13.0     Sodium 135(L)   134(L)     Specific [place], UA   1.010      Specimen UA   Urine, Supra Pubic      Squam Epithel, UA   3      TSH 1.225        Urobilinogen, UA   Negative      WBC Clumps, UA   Occasional(A)      WBC, UA   100(H)      WBC    10.58           Significant Imaging:  All pertinent imaging results/findings from the past 24 hours have been reviewed.

## 2018-06-08 NOTE — HPI
Tasha Hawley is a 62 y.o. white woman with hypothyroidism, coronary artery disease with history of acute coronary syndrome in January 2016 and ischemic cardiomyopathy (ejection fraction since improved), thoracic aortic atherosclerosis, severe left atrial enlargement, diastolic dysfunction, gastroesophageal reflux disease status post Nissen fundoplication, right facial droop since birth, history of work-related back injury in the 1990s with scoliosis, lumbar degenerative disc disease, lumbar facet arthropathy, history of multiple fusions and spacers from L3-S1, status post spinal cord stimulator and intrathecal hydromorphone pump, chronic constipation, neurogenic bladder status post suprapubic catheter placement, recurrent urinary tract infections, obstructive sleep apnea, lower extremity lymphedema, chronic insomnia, and depression.  She has difficulty ambulating at baseline.  She lives in [place], [place].  She is .  Her primary care physician is Dr. Mesfin Hodges.  Her pain management specialist is Dr. Nigel Hillman.  Her urologist is Dr. Shireen Mayo.  Her gastroenterologist is Dr. Prince Vance.  Her psychiatrist is Dr. Erik Oconnor.  She can tolerate cephalosporins.   She presented to [facility] on 6/7/18 with intractable vomiting since the day before.  She was unable to retain any solids or liquids.  She had associated abdominal pain and episodes of diarrhea the day before.  She was seen at an urgent care clinic prior to being sent to the ED.  She reported blood in her urine.  Labs showed increased BUN (18, from 7) and creatinine (1.5, from 0.7).  Urinalysis showed positive nitrites, >100 RBC/hpf, >100 WBC/hpf with WBC clumps, and moderate bacteria.  Her suprapubic catheter was last changed on 5/25/18.  She was given 1 liter of normal saline, ondansetron, and ceftriaxone.  She was admitted to [facility].

## 2018-06-08 NOTE — H&P
[facility]  History & Physical    Patient Name: Tasha Hawley  MRN: [id number]  Admission Date: 6/7/2018  Attending Physician: Kwame Concepcion MD   Primary Care Provider: Mesfin Hodges Ii, MD         Patient information was obtained from patient, past medical records and ER records.     Subjective:     Principal Problem:Recurrent UTI (urinary tract infection)    Chief Complaint:   Chief Complaint   Patient presents with    Urinary Tract Infection     pt arrived with spouse per whch and reports sent from urgent care for abd pain vomiting and uti and bloody urine in catheter; pt reports was given zofran at urgent care    Emesis    Abdominal Pain        HPI: Tasha Hawley is a 61 y.o. white woman with hypothyroidism, coronary artery disease with history of acute coronary syndrome in January 2016 and ischemic cardiomyopathy (ejection fraction since improved), thoracic aortic atherosclerosis, severe left atrial enlargement, diastolic dysfunction, gastroesophageal reflux disease status post Nissen fundoplication, right facial droop since birth, history of work-related back injury in the 1990s with scoliosis, lumbar degenerative disc disease, lumbar facet arthropathy, history of multiple fusions and spacers from L3-S1, status post spinal cord stimulator and intrathecal hydromorphone pump, chronic constipation, neurogenic bladder status post suprapubic catheter placement, recurrent PROTEUS MIRABILIS urinary tract infections (resistant to cipro), obstructive sleep apnea, lower extremity lymphedema, chronic insomnia, and depression.  She has difficulty ambulating at baseline.  She lives in [place], [place].  She is .  Her primary care physician is Dr. Mesfin Hodges.  Her pain management specialist is Dr. Nigel Hillman.  Her urologist is Dr. Shireen Mayo.  Her gastroenterologist is Dr. Prince Vance.  Her psychiatrist is Dr. Erik Oconnor.  She has many antibiotic  allergies. She can tolerate cephalosporins.    She presented to [facility] ED due to intractable vomiting starting yesterday morning. She has been unable to retain any liquids or solids. She has associated abdominal pain but denies any blood in vomit, fever or chills. She had episodes of diarrhea yesterday but none today. She was seen at an urgent care today for symptoms and was told she had an UTI and referred to the ED for further evaluation. She admits to having blood in urine of catheter bag.  She is afebrile with no leukocytosis.  She has a bump in her BUN 18 (up from 7), and Creatinine 1.5 (up from 0.7).  Her shows significant UTI with + Nitrites, RBC > 100,  with clumps and moderate bacteria. Suprapubic cath last changed on 5/25/18.  She was given 1 liter of normal saline, zofran, and 1 gram of ceftriaxone. She is admitted to [facility].        Past Medical History:   Diagnosis Date    Anticoagulant long-term use     Anxiety     Arthritis     Bilateral lower extremity edema     severe chronic    Carotid artery occlusion     Cataract     Depression     Fever blister     Hypothyroid     Iron deficiency anemia     Lumbar radiculopathy     with chronic pain    Ocular migraine     Sleep apnea     cpap       Past Surgical History:   Procedure Laterality Date    BACK SURGERY      x 12    CATARACT EXTRACTION W/  INTRAOCULAR LENS IMPLANT Left     Dr Coleman     ESOPHAGOGASTRODUODENOSCOPY N/A 5/23/2018    Procedure: ESOPHAGOGASTRODUODENOSCOPY (EGD);  Surgeon: Prince Vance MD;  Location: [address]);  Service: Endoscopy;  Laterality: N/A;  r/s 'd per Dr. Vance due to family emergency- ER    HYSTERECTOMY  1975    endometriosis    pain pump placement      SQ Dilaudid Pump managed by Dr. Hillman, Pain Management    SPINAL CORD STIMULATOR REMOVAL      before [place]    SPINE SURGERY  5-13-13    CERVICAL FUSION       Review of patient's allergies indicates:    Allergen Reactions    (d)-limonene flavor      Other reaction(s): difficult intubation  Other reaction(s): Difficulty breathing    Bactrim [sulfamethoxazole-trimethoprim] Anaphylaxis    Imitrex [sumatriptan succinate] Shortness Of Breath    Penicillins Shortness Of Breath     Other reaction(s): Jittery    Topamax [topiramate] Shortness Of Breath    Vancomycin Shortness Of Breath     Rash    Butorphanol tartrate     Darvocet a500 [propoxyphene n-acetaminophen]      Other reaction(s): Difficulty breathing    Fentanyl      Other reaction(s): Vomiting  Other reaction(s): Nausea  Other reaction(s): Itching  swelling    White petrolatum-zinc     Zinc oxide-white petrolatum      Other reaction(s): Difficulty breathing    Latex, natural rubber Itching and Rash       No current facility-administered medications on file prior to encounter.      Current Outpatient Prescriptions on File Prior to Encounter   Medication Sig    atorvastatin (LIPITOR) 80 MG tablet TAKE ONE TABLET BY MOUTH EVERY DAY. (Patient taking differently: TAKE ONE TABLET BY MOUTH Nightly)    furosemide (LASIX) 40 MG tablet Take 1 tablet (40 mg total) by mouth 2 (two) times daily.    levothyroxine (SYNTHROID) 125 MCG tablet Take 1 tablet (125 mcg total) by mouth before breakfast.    pantoprazole (PROTONIX) 40 MG tablet Take 1 tablet (40 mg total) by mouth once daily.    tiZANidine (ZANAFLEX) 4 MG tablet     aspirin (ECOTRIN) 81 MG EC tablet Take 1 tablet (81 mg total) by mouth once daily.    DOC-Q-LACE 100 mg capsule TAKE ONE CAPSULE BY MOUTH THREE TIMES DAILY AS NEEDED FOR CONSTIPATION    docosanol (ABREVA) 10 % Crea Apply 1 application topically 2 (two) times daily.    FLUoxetine (PROZAC) 20 MG capsule Take 3 capsules (60 mg total) by mouth once daily.    hydrocodone-acetaminophen 10-325mg (NORCO)  mg Tab Take 2 tablets by mouth every 4 (four) hours as needed for Pain.     Lactobacillus acidophilus (PROBIOTIC) 10 billion cell  Cap Take 1 capsule by mouth once daily.    ondansetron (ZOFRAN) 8 MG tablet TAKE ONE TABLET BY MOUTH EVERY TWELVE HOURS AS NEEDED FOR NAUSEA    ondansetron (ZOFRAN-ODT) 4 MG TbDL Take 1 tablet (4 mg total) by mouth every 8 (eight) hours as needed.    potassium chloride (KLOR-CON) 10 MEQ TbSR     potassium chloride SA (K-DUR,KLOR-CON) 20 MEQ tablet TAKE THREE TABLETS BY MOUTH DAILY    potassium citrate (UROCIT-K) 5 mEq (540 mg) TbSR Take 1 tablet (5 mEq total) by mouth 3 (three) times daily with meals.    QUEtiapine (SEROQUEL) 100 MG Tab     ranitidine (ZANTAC) 150 MG capsule Take 1 capsule (150 mg total) by mouth 2 (two) times daily.    SENNOSIDES (SENNA LAX ORAL) Take 20 mg by mouth every evening.     traZODone (DESYREL) 100 MG tablet Take 2 tablets (200 mg total) by mouth every evening.    [DISCONTINUED] lamotrigine (LAMICTAL) 25 MG tablet Take 1 tablet (25 mg total) by mouth once daily.     Family History     Problem Relation (Age of Onset)    Cancer Mother (55), Father    Esophageal cancer Father    Heart disease Maternal Uncle    No Known Problems Brother, Brother, Sister, Maternal Aunt, Paternal Aunt, Paternal Uncle, Maternal Grandfather, Paternal Grandmother, Paternal Grandfather    Parkinsonism Maternal Grandmother    Tremor Maternal Grandmother        Social History Main Topics    Smoking status: Never Smoker    Smokeless tobacco: Never Used    Alcohol use No      Comment: denies    Drug use: No    Sexual activity: No     Review of Systems   Constitutional: Negative for chills, diaphoresis and fever.   HENT: Negative for sore throat and trouble swallowing.    Eyes: Negative for photophobia and visual disturbance.   Respiratory: Negative for cough, shortness of breath and wheezing.    Cardiovascular: Negative for chest pain and palpitations.   Gastrointestinal: Positive for abdominal pain (Bilateral Lower Quadrant), nausea and vomiting. Negative for constipation and diarrhea.   Endocrine:  Negative for polydipsia and polyphagia.   Genitourinary: Negative for decreased urine volume, dysuria, hematuria and urgency.   Musculoskeletal: Positive for arthralgias, back pain, myalgias and neck pain. Negative for joint swelling and neck stiffness.   Neurological: Negative for weakness, numbness and headaches.   Psychiatric/Behavioral: Positive for dysphoric mood. Negative for agitation and suicidal ideas.     Objective:     Vital Signs (Most Recent):  Temp: 98.4 °F (36.9 °C) (06/07/18 2112)  Pulse: 61 (06/07/18 2300)  Resp: 18 (06/07/18 2300)  BP: (!) 100/49 (06/07/18 2300)  SpO2: 99 % (06/07/18 2300) Vital Signs (24h Range):  Temp:  [98.4 °F (36.9 °C)] 98.4 °F (36.9 °C)  Pulse:  [61-66] 61  Resp:  [18-20] 18  SpO2:  [97 %-99 %] 99 %  BP: (100-105)/(49-65) 100/49     Weight: 69.4 kg (153 lb)  Body mass index is 23.96 kg/m².    Physical Exam   Constitutional: She is oriented to person, place, and time. She has a sickly appearance. No distress.   HENT:   Head: Normocephalic and atraumatic.   Mouth/Throat: No oropharyngeal exudate.   Oropharynx Dry   Eyes: Conjunctivae are normal. Pupils are equal, round, and reactive to light. No scleral icterus.   Neck: Neck supple.   Cardiovascular: Normal rate, regular rhythm, normal heart sounds and intact distal pulses.  Exam reveals no gallop and no friction rub.    No murmur heard.  Pulmonary/Chest: Effort normal and breath sounds normal. No respiratory distress. She has no wheezes. She has no rales.   Abdominal: Soft. Bowel sounds are normal. She exhibits no distension. There is tenderness (Bilateral Lower Quadants). There is no rebound and no guarding.   Genitourinary:   Genitourinary Comments: Supra-Pubic Catheter with Clean Dressing   Musculoskeletal: She exhibits no edema, tenderness or deformity.   Neurological: She is alert and oriented to person, place, and time. She exhibits normal muscle tone.   Skin: Skin is warm and dry. Capillary refill takes less than 2  seconds. No rash noted. She is not diaphoretic.   Psychiatric: She has a normal mood and affect. Her behavior is normal.   Nursing note and vitals reviewed.        CRANIAL NERVES     CN III, IV, VI   Pupils are equal, round, and reactive to light.       Significant Labs:   CBC:   Recent Labs  Lab 06/07/18  2250   WBC 10.58   HGB 12.4   HCT 39.0        CMP:   Recent Labs  Lab 06/07/18 2250 06/08/18  0343   * 135*   K 3.7 3.5   CL 96 100   CO2 30* 25   GLU 91 97   BUN 18 15   CREATININE 1.5* 1.2   CALCIUM 9.8 9.1   PROT 7.7  --    ALBUMIN 4.1  --    BILITOT 0.5  --    ALKPHOS 131  --    AST 16  --    ALT 10  --    ANIONGAP 8 10   EGFRNONAA 37* 49*     Lactic Acid:   Recent Labs  Lab 06/08/18  0132   LACTATE 1.6     TSH:   Recent Labs  Lab 06/08/18  0343   TSH 1.225     Urine Studies:   Recent Labs  Lab 06/07/18  2251   COLORU Yellow   APPEARANCEUA Cloudy*   PHUR 8.0   SPECGRAV 1.010   PROTEINUA 2+*   GLUCUA Negative   KETONESU Negative   BILIRUBINUA Negative   OCCULTUA 3+*   NITRITE Positive*   UROBILINOGEN Negative   LEUKOCYTESUR 3+*   RBCUA >100*   WBCUA 100*   BACTERIA Moderate*   SQUAMEPITHEL 3   HYALINECASTS 0       Significant Imaging:  No New    Assessment/Plan:     * Recurrent UTI (urinary tract infection)    Neurogenic bladder  Suprapubic catheter  61 yo female with suprapubic catheter, last changed by home health 5/25/18 with  recurrent PROTEUS MIRABILIS urinary tract infections (resistant to cipro). Urine today with + Nitrites, RBC > 100,  with clumps and moderate bacteria. Patient states that there was purulent drainage around insertion site before she changed it.   --Started on Ceftriaxone  --Follow Urine cultre  --Urology consult to change meadows and pull new culture              Chronic pain syndrome    Cervical myelopathy  Degenerative disc disease, lumbar  S/P insertion of spinal cord stimulator  S/P insertion of intrathecal pump (Continious Dilaudid Infusion)  Unsure of Dilaudid  infusion dose.  Continue home medications:  --PRN tizanidine 4 mg TID prn, Hydrocodone-APAP  mg every 4 hours PRN          CARITO (acute kidney injury)    BUN 18 (up from 7), and Creatinine 1.5 (up from 0.7).   D/T Vomiting  Strict Intake and Output, Avoid nephrotoxins, and renally dose medications.            Intractable nausea and vomiting    PRN Nausea Medication.           Chronic combined systolic and diastolic congestive heart failure    Bilateral carotid artery disease  Coronary artery disease involving native coronary artery of native heart without angina pectoris  8/21/17 Echo, Normal EF + DD  Continue home aspirin 81 mg daily, clopidogrel 75 mg daily            Lymphedema of both lower extremities    Improving. Wearing 2 walking boots  --Continue home furosemide  40 mg BID and Potassium 20 mequ daily          Primary hypothyroidism    Continue home levothyroxine 125 mcg daily. Check TSH          GERD (gastroesophageal reflux disease)    Continue home pepcid 20 mg daily          Iron deficiency anemia    Major depressive disorder, recurrent, mild  Continue home Trazadone 200 mg daily, Fluoxetine 60 mg daily, bupropion 24 hr 300 mg daily            VTE Risk Mitigation         Ordered     enoxaparin injection 40 mg  Every 12 hours      06/08/18 0254     IP VTE HIGH RISK PATIENT  Once      06/08/18 0254             Nurys Khan NP  Department of Hospital Medicine   [facility]

## 2018-06-08 NOTE — PLAN OF CARE
Problem: Patient Care Overview  Goal: Plan of Care Review  Outcome: Ongoing (interventions implemented as appropriate)  Plan of care reviewed with patient. Patient verbalized complete understanding. Fall precautions maintained. Bed in lowest position, locked, call light within reach and bed alarm is on. Side rails up x's 2 with slip resistant socks on. PRN hydroxyzine given.  Nurse instructed patient to notify staff for any assistance and the patient verbalized complete understanding. Patient on telemetry throughout shift with no ectopy noted. Will continue to monitor.

## 2018-06-08 NOTE — CONSULTS
[facility]  Urology  Consult Note    Patient Name: Tasha Hawley  MRN: [id number]  Admission Date: 6/7/2018  Hospital Length of Stay: 0   Code Status: Full Code   Attending Provider: Kwame Concepcion MD   Consulting Provider: Mundo Duran MD  Primary Care Physician: Mesfin Hodges Ii, MD  Principal Problem:Recurrent UTI (urinary tract infection)    Inpatient consult to Urology  Consult performed by: MUNDO DURAN  Consult ordered by: GERMÁN LEAVITT  Reason for consult: suprapubic tube exchange  Assessment/Recommendations: 62 y.o. female with incomplete emptying managed with suprapubic tube exchanges monthly    Plan:  1. Suprapubic tube exchanged without any difficulty, 20 Mal[place]ian with 30cc in balloon per patient request.   2. New urine sample from suprapubic tube sent for culture. Would recommend to target antibiotic treatment based off of these results rather than the one collected from the previous indwelling suprapubic tube which is likely catheterized.  3. Followup with Dr. Mayo her urologist PRN. She has home health suprapubic tube exchanges monthly.          Subjective:     HPI:  62 y.o. female with past medical history (below) who is an established patient of Dr. Mayo. She has a history of lower back pain, chronic lower extremity edema, longstanding cardiac disease that is compensated.  She has a history of incomplete bladder emptying and has an indwelling suprapubic tube. A consultation was placed for an exchange while she was hospitalized.     Past Medical History:   Diagnosis Date    Anticoagulant long-term use     Anxiety     Arthritis     Bilateral lower extremity edema     severe chronic    Carotid artery occlusion     Cataract     Depression     Fever blister     Hypothyroid     Iron deficiency anemia     Lumbar radiculopathy     with chronic pain    Ocular migraine     Sleep apnea     cpap     Past Surgical History:   Procedure Laterality Date    BACK  SURGERY      x 12    CATARACT EXTRACTION W/  INTRAOCULAR LENS IMPLANT Left     Dr Coleman     ESOPHAGOGASTRODUODENOSCOPY N/A 5/23/2018    Procedure: ESOPHAGOGASTRODUODENOSCOPY (EGD);  Surgeon: Prince Vance MD;  Location: [facility] ([address] [place] [address]);  Service: Endoscopy;  Laterality: N/A;  r/s 'd per Dr. Vance due to family emergency- ER    HYSTERECTOMY  1975    endometriosis    pain pump placement      SQ Dilaudid Pump managed by Dr. Hillman, Pain Management    SPINAL CORD STIMULATOR REMOVAL      before Larissa    SPINE SURGERY  5-13-13    CERVICAL FUSION     Family History   Problem Relation Age of Onset    Cancer Mother 55        breast    Cancer Father         esophagus,had laryngectomy    Esophageal cancer Father     Parkinsonism Maternal Grandmother     Tremor Maternal Grandmother     No Known Problems Brother     No Known Problems Brother     Heart disease Maternal Uncle     No Known Problems Sister     No Known Problems Maternal Aunt     No Known Problems Paternal Aunt     No Known Problems Paternal Uncle     No Known Problems Maternal Grandfather     No Known Problems Paternal Grandmother     No Known Problems Paternal Grandfather     Melanoma Neg Hx     Amblyopia Neg Hx     Blindness Neg Hx     Cataracts Neg Hx     Diabetes Neg Hx     Glaucoma Neg Hx     Hypertension Neg Hx     Macular degeneration Neg Hx     Retinal detachment Neg Hx     Strabismus Neg Hx     Stroke Neg Hx     Thyroid disease Neg Hx     Colon cancer Neg Hx      Social History   Substance Use Topics    Smoking status: Never Smoker    Smokeless tobacco: Never Used    Alcohol use No      Comment: denies     No current facility-administered medications on file prior to encounter.      Current Outpatient Prescriptions on File Prior to Encounter   Medication Sig Dispense Refill    aspirin (ECOTRIN) 81 MG EC tablet Take 1 tablet (81 mg total) by mouth once daily.  0    atorvastatin (LIPITOR) 80 MG tablet  TAKE ONE TABLET BY MOUTH EVERY DAY. (Patient taking differently: TAKE ONE TABLET BY MOUTH Nightly) 90 tablet 3    DOC-Q-LACE 100 mg capsule TAKE ONE CAPSULE BY MOUTH THREE TIMES DAILY AS NEEDED FOR CONSTIPATION 180 capsule 3    FLUoxetine (PROZAC) 20 MG capsule Take 3 capsules (60 mg total) by mouth once daily. 90 capsule 1    furosemide (LASIX) 40 MG tablet Take 1 tablet (40 mg total) by mouth 2 (two) times daily. 60 tablet 11    hydrocodone-acetaminophen 10-325mg (NORCO)  mg Tab Take 2 tablets by mouth every 4 (four) hours as needed for Pain.       Lactobacillus acidophilus (PROBIOTIC) 10 billion cell Cap Take 1 capsule by mouth once daily.      levothyroxine (SYNTHROID) 125 MCG tablet Take 1 tablet (125 mcg total) by mouth before breakfast. 90 tablet 3    ondansetron (ZOFRAN-ODT) 4 MG TbDL Take 1 tablet (4 mg total) by mouth every 8 (eight) hours as needed. 30 tablet 0    pantoprazole (PROTONIX) 40 MG tablet Take 1 tablet (40 mg total) by mouth once daily. 90 tablet 3    potassium chloride SA (K-DUR,KLOR-CON) 20 MEQ tablet TAKE THREE TABLETS BY MOUTH DAILY (Patient taking differently: TAKE Two TABLETS BY MOUTH DAILY) 270 tablet 3    QUEtiapine (SEROQUEL) 100 MG Tab       ranitidine (ZANTAC) 150 MG capsule Take 1 capsule (150 mg total) by mouth 2 (two) times daily. 180 capsule 3    SENNOSIDES (SENNA LAX ORAL) Take 20 mg by mouth every evening.       tiZANidine (ZANAFLEX) 4 MG tablet       traZODone (DESYREL) 100 MG tablet Take 2 tablets (200 mg total) by mouth every evening. 120 tablet 4    docosanol (ABREVA) 10 % Crea Apply 1 application topically 2 (two) times daily. 2 g 0    ondansetron (ZOFRAN) 8 MG tablet TAKE ONE TABLET BY MOUTH EVERY TWELVE HOURS AS NEEDED FOR NAUSEA 60 tablet 2    [DISCONTINUED] lamotrigine (LAMICTAL) 25 MG tablet Take 1 tablet (25 mg total) by mouth once daily. 30 tablet 6    [DISCONTINUED] potassium chloride (KLOR-CON) 10 MEQ TbSR       [DISCONTINUED] potassium  citrate (UROCIT-K) 5 mEq (540 mg) TbSR Take 1 tablet (5 mEq total) by mouth 3 (three) times daily with meals. 90 tablet 11     Review of patient's allergies indicates:   Allergen Reactions    (d)-limonene flavor      Other reaction(s): difficult intubation  Other reaction(s): Difficulty breathing    Bactrim [sulfamethoxazole-trimethoprim] Anaphylaxis    Benadryl [diphenhydramine hcl] Shortness Of Breath    Imitrex [sumatriptan succinate] Shortness Of Breath    Penicillins Shortness Of Breath     Other reaction(s): Jittery    Topamax [topiramate] Shortness Of Breath    Vancomycin Shortness Of Breath     Rash    Butorphanol tartrate     Darvocet a500 [propoxyphene n-acetaminophen]      Other reaction(s): Difficulty breathing    Fentanyl      Other reaction(s): Vomiting  Other reaction(s): Nausea  Other reaction(s): Itching  swelling    White petrolatum-zinc     Zinc oxide-white petrolatum      Other reaction(s): Difficulty breathing    Latex, natural rubber Itching and Rash       Past Medical History:   Diagnosis Date    Anticoagulant long-term use     Anxiety     Arthritis     Bilateral lower extremity edema     severe chronic    Carotid artery occlusion     Cataract     Depression     Fever blister     Hypothyroid     Iron deficiency anemia     Lumbar radiculopathy     with chronic pain    Ocular migraine     Sleep apnea     cpap       Past Surgical History:   Procedure Laterality Date    BACK SURGERY      x 12    CATARACT EXTRACTION W/  INTRAOCULAR LENS IMPLANT Left     Dr Coleman     ESOPHAGOGASTRODUODENOSCOPY N/A 5/23/2018    Procedure: ESOPHAGOGASTRODUODENOSCOPY (EGD);  Surgeon: Prince Vance MD;  Location: [address]);  Service: Endoscopy;  Laterality: N/A;  r/s 'd per Dr. Vance due to family emergency- ER    HYSTERECTOMY  1975    endometriosis    pain pump placement      SQ Dilaudid Pump managed by Dr. Hillman, Pain Management    SPINAL CORD STIMULATOR REMOVAL       before Larissa    SPINE SURGERY  5-13-13    CERVICAL FUSION       Review of patient's allergies indicates:   Allergen Reactions    (d)-limonene flavor      Other reaction(s): difficult intubation  Other reaction(s): Difficulty breathing    Bactrim [sulfamethoxazole-trimethoprim] Anaphylaxis    Benadryl [diphenhydramine hcl] Shortness Of Breath    Imitrex [sumatriptan succinate] Shortness Of Breath    Penicillins Shortness Of Breath     Other reaction(s): Jittery    Topamax [topiramate] Shortness Of Breath    Vancomycin Shortness Of Breath     Rash    Butorphanol tartrate     Darvocet a500 [propoxyphene n-acetaminophen]      Other reaction(s): Difficulty breathing    Fentanyl      Other reaction(s): Vomiting  Other reaction(s): Nausea  Other reaction(s): Itching  swelling    White petrolatum-zinc     Zinc oxide-white petrolatum      Other reaction(s): Difficulty breathing    Latex, natural rubber Itching and Rash       Family History     Problem Relation (Age of Onset)    Cancer Mother (55), Father    Esophageal cancer Father    Heart disease Maternal Uncle    No Known Problems Brother, Brother, Sister, Maternal Aunt, Paternal Aunt, Paternal Uncle, Maternal Grandfather, Paternal Grandmother, Paternal Grandfather    Parkinsonism Maternal Grandmother    Tremor Maternal Grandmother          Social History Main Topics    Smoking status: Never Smoker    Smokeless tobacco: Never Used    Alcohol use No      Comment: denies    Drug use: No    Sexual activity: No       Review of Systems   Constitutional: Negative for diaphoresis.   HENT: Negative for facial swelling.    Eyes: Negative for visual disturbance.   Respiratory: Negative for shortness of breath.    Cardiovascular: Negative for chest pain.   Gastrointestinal: Negative for abdominal distention.   Musculoskeletal: Negative for gait problem.   Skin: Negative for color change.   Neurological: Negative for speech difficulty.   Hematological: Does  not bruise/bleed easily.   Psychiatric/Behavioral: Negative for agitation and behavioral problems.       Objective:     Temp:  [97.3 °F (36.3 °C)-98.4 °F (36.9 °C)] 97.3 °F (36.3 °C)  Pulse:  [54-66] 58  Resp:  [18-20] 20  SpO2:  [96 %-100 %] 96 %  BP: ()/(37-70) 100/65     Body mass index is 23.96 kg/m².      Date 06/08/18 0700 - 06/09/18 0659   Shift 0093-9294 0921-8165 [id number] 24 Hour Total   I  N  T  A  K  E   P.O. 280   280    Shift Total  (mL/kg) 280  (4)   280  (4)   O  U  T  P  U  T   Shift Total  (mL/kg)       Weight (kg) 69.4 69.4 69.4 69.4          Drains     Drain                 Urethral Catheter -- days                Physical Exam   Constitutional: She is oriented to person, place, and time. She appears well-developed and well-nourished.   HENT:   Head: Normocephalic and atraumatic.   Eyes: EOM are normal. Pupils are equal, round, and reactive to light.   Neck: Normal range of motion. Neck supple.   Cardiovascular: Intact distal pulses.    Pulmonary/Chest: Effort normal. No respiratory distress.   Abdominal: Soft. There is no tenderness.   20 [place]guillan suprapubic tube indwelling - deflated fluid from balloon (about 25cc of fluid) and the suprapubic tube was removed.    The suprapubic tube site was prepped with betadyne and a new 20 Anguillan suprapubic tube was inserted and inflated with 30cc of sterile saline per the patient's request. A new urine sample was collected through the new suprapubic tube and will be sent off for culture.    Musculoskeletal: Normal range of motion. She exhibits no edema.   Neurological: She is alert and oriented to person, place, and time.   Skin: Skin is warm and dry.     Psychiatric: She has a normal mood and affect. Her behavior is normal.       Significant Labs:    BMP:    Recent Labs  Lab 06/07/18  2250 06/08/18  0343   * 135*   K 3.7 3.5   CL 96 100   CO2 30* 25   BUN 18 15   CREATININE 1.5* 1.2   CALCIUM 9.8 9.1       CBC:    Recent Labs  Lab 06/07/18  4638    WBC 10.58   HGB 12.4   HCT 39.0          All pertinent labs results from the past 24 hours have been reviewed.  Recent Lab Results       06/08/18  0343 06/08/18  0132 06/07/18  2251 06/07/18  2250      Albumin    4.1     Alkaline Phosphatase    131     ALT    10     Anion Gap 10   8     Appearance, UA   Cloudy(A)      AST    16     Bacteria, UA   Moderate(A)      Baso #    0.02     Basophil%    0.2     Bilirubin (UA)   Negative      Total Bilirubin    0.5  Comment:  For infants and newborns, interpretation of results should be based  on gestational age, weight and in agreement with clinical  observations.  Premature Infant recommended reference ranges:  Up to 24 hours.............<8.0 mg/dL  Up to 48 hours............<12.0 mg/dL  3-5 days..................<15.0 mg/dL  6-29 days.................<15.0 mg/dL       BUN, Bld 15   18     Calcium 9.1   9.8     Chloride 100   96     CO2 25   30(H)     Color, UA   Yellow      Creatinine 1.2   1.5(H)     Differential Method    Automated     eGFR if  56(A)   43(A)     eGFR if non  49  Comment:  Calculation used to obtain the estimated glomerular filtration  rate (eGFR) is the CKD-EPI equation.   (A)   37  Comment:  Calculation used to obtain the estimated glomerular filtration  rate (eGFR) is the CKD-EPI equation.   (A)     Eos #    0.1     Eosinophil%    1.1     Glucose 97   91     Glucose, UA   Negative      Gran # (ANC)    7.3     Gran%    68.7     Hematocrit    39.0     Hemoglobin    12.4     Hyaline Casts, UA   0      Ketones, UA   Negative      Lactate, Tho  1.6  Comment:  Falsely low lactic acid results can be found in samples   containing >=13.0 mg/dL total bilirubin and/or >=3.5 mg/dL   direct bilirubin.         Leukocytes, UA   3+(A)      Lipase    34     Lymph #    2.5     Lymph%    23.8     Magnesium 1.9        MCH    26.6(L)     MCHC    31.8(L)     MCV    84     Microscopic Comment   SEE COMMENT  Comment:  Other formed  elements not mentioned in the report are not   present in the microscopic examination.         Mono #    0.6     Mono%    6.0     MPV    9.7     Nitrite, UA   Positive(A)      Occult Blood UA   3+(A)      pH, UA   8.0      Platelets    216     Potassium 3.5   3.7     Total Protein    7.7     Protein, UA   2+  Comment:  Recommend a 24 hour urine protein or a urine   protein/creatinine ratio if globulin induced proteinuria is  clinically suspected.  (A)      RBC    4.66     RBC, UA   >100(H)      RDW    13.0     Sodium 135(L)   134(L)     Specific Gates, UA   1.010      Specimen UA   Urine, Supra Pubic      Squam Epithel, UA   3      TSH 1.225        Urobilinogen, UA   Negative      WBC Clumps, UA   Occasional(A)      WBC, UA   100(H)      WBC    10.58           Significant Imaging:  All pertinent imaging results/findings from the past 24 hours have been reviewed.                    Assessment and Plan:     Neurogenic bladder    62 y.o. female with incomplete emptying managed with suprapubic tube exchanges monthly    Plan:  1. Suprapubic tube exchanged without any difficulty, 20 Czech with 30cc in balloon per patient request.   2. New urine sample from suprapubic tube sent for culture. Would recommend to target antibiotic treatment based off of these results rather than the one collected from the previous indwelling suprapubic tube which is likely catheterized.  3. Followup with Dr. Mayo her urologist PRN. She has home health suprapubic tube exchanges monthly.            VTE Risk Mitigation         Ordered     enoxaparin injection 40 mg  Every 12 hours      06/08/18 0254     IP VTE HIGH RISK PATIENT  Once      06/08/18 0254          Thank you for your consult.      Sunni Villalba MD  Urology  [facility]

## 2018-06-08 NOTE — HPI
62 y.o. female with past medical history (below) who is an established patient of Dr. Mayo. She has a history of lower back pain, chronic lower extremity edema, longstanding cardiac disease that is compensated.  She has a history of incomplete bladder emptying and has an indwelling suprapubic tube. A consultation was placed for an exchange while she was hospitalized.     Past Medical History:   Diagnosis Date    Anticoagulant long-term use     Anxiety     Arthritis     Bilateral lower extremity edema     severe chronic    Carotid artery occlusion     Cataract     Depression     Fever blister     Hypothyroid     Iron deficiency anemia     Lumbar radiculopathy     with chronic pain    Ocular migraine     Sleep apnea     cpap     Past Surgical History:   Procedure Laterality Date    BACK SURGERY      x 12    CATARACT EXTRACTION W/  INTRAOCULAR LENS IMPLANT Left     Dr Coleman     ESOPHAGOGASTRODUODENOSCOPY N/A 5/23/2018    Procedure: ESOPHAGOGASTRODUODENOSCOPY (EGD);  Surgeon: Prince Vance MD;  Location: [address]);  Service: Endoscopy;  Laterality: N/A;  r/s 'd per Dr. Vance due to family emergency- ER    HYSTERECTOMY  1975    endometriosis    pain pump placement      SQ Dilaudid Pump managed by Dr. Hillman, Pain Management    SPINAL CORD STIMULATOR REMOVAL      before Larissa    SPINE SURGERY  5-13-13    CERVICAL FUSION     Family History   Problem Relation Age of Onset    Cancer Mother 55        breast    Cancer Father         esophagus,had laryngectomy    Esophageal cancer Father     Parkinsonism Maternal Grandmother     Tremor Maternal Grandmother     No Known Problems Brother     No Known Problems Brother     Heart disease Maternal Uncle     No Known Problems Sister     No Known Problems Maternal Aunt     No Known Problems Paternal Aunt     No Known Problems Paternal Uncle     No Known Problems Maternal Grandfather     No Known Problems Paternal Grandmother      No Known Problems Paternal Grandfather     Melanoma Neg Hx     Amblyopia Neg Hx     Blindness Neg Hx     Cataracts Neg Hx     Diabetes Neg Hx     Glaucoma Neg Hx     Hypertension Neg Hx     Macular degeneration Neg Hx     Retinal detachment Neg Hx     Strabismus Neg Hx     Stroke Neg Hx     Thyroid disease Neg Hx     Colon cancer Neg Hx      Social History   Substance Use Topics    Smoking status: Never Smoker    Smokeless tobacco: Never Used    Alcohol use No      Comment: denies     No current facility-administered medications on file prior to encounter.      Current Outpatient Prescriptions on File Prior to Encounter   Medication Sig Dispense Refill    aspirin (ECOTRIN) 81 MG EC tablet Take 1 tablet (81 mg total) by mouth once daily.  0    atorvastatin (LIPITOR) 80 MG tablet TAKE ONE TABLET BY MOUTH EVERY DAY. (Patient taking differently: TAKE ONE TABLET BY MOUTH Nightly) 90 tablet 3    DOC-Q-LACE 100 mg capsule TAKE ONE CAPSULE BY MOUTH THREE TIMES DAILY AS NEEDED FOR CONSTIPATION 180 capsule 3    FLUoxetine (PROZAC) 20 MG capsule Take 3 capsules (60 mg total) by mouth once daily. 90 capsule 1    furosemide (LASIX) 40 MG tablet Take 1 tablet (40 mg total) by mouth 2 (two) times daily. 60 tablet 11    hydrocodone-acetaminophen 10-325mg (NORCO)  mg Tab Take 2 tablets by mouth every 4 (four) hours as needed for Pain.       Lactobacillus acidophilus (PROBIOTIC) 10 billion cell Cap Take 1 capsule by mouth once daily.      levothyroxine (SYNTHROID) 125 MCG tablet Take 1 tablet (125 mcg total) by mouth before breakfast. 90 tablet 3    ondansetron (ZOFRAN-ODT) 4 MG TbDL Take 1 tablet (4 mg total) by mouth every 8 (eight) hours as needed. 30 tablet 0    pantoprazole (PROTONIX) 40 MG tablet Take 1 tablet (40 mg total) by mouth once daily. 90 tablet 3    potassium chloride SA (K-DUR,KLOR-CON) 20 MEQ tablet TAKE THREE TABLETS BY MOUTH DAILY (Patient taking differently: TAKE Two TABLETS  BY MOUTH DAILY) 270 tablet 3    QUEtiapine (SEROQUEL) 100 MG Tab       ranitidine (ZANTAC) 150 MG capsule Take 1 capsule (150 mg total) by mouth 2 (two) times daily. 180 capsule 3    SENNOSIDES (SENNA LAX ORAL) Take 20 mg by mouth every evening.       tiZANidine (ZANAFLEX) 4 MG tablet       traZODone (DESYREL) 100 MG tablet Take 2 tablets (200 mg total) by mouth every evening. 120 tablet 4    docosanol (ABREVA) 10 % Crea Apply 1 application topically 2 (two) times daily. 2 g 0    ondansetron (ZOFRAN) 8 MG tablet TAKE ONE TABLET BY MOUTH EVERY TWELVE HOURS AS NEEDED FOR NAUSEA 60 tablet 2    [DISCONTINUED] lamotrigine (LAMICTAL) 25 MG tablet Take 1 tablet (25 mg total) by mouth once daily. 30 tablet 6    [DISCONTINUED] potassium chloride (KLOR-CON) 10 MEQ TbSR       [DISCONTINUED] potassium citrate (UROCIT-K) 5 mEq (540 mg) TbSR Take 1 tablet (5 mEq total) by mouth 3 (three) times daily with meals. 90 tablet 11     Review of patient's allergies indicates:   Allergen Reactions    (d)-limonene flavor      Other reaction(s): difficult intubation  Other reaction(s): Difficulty breathing    Bactrim [sulfamethoxazole-trimethoprim] Anaphylaxis    Benadryl [diphenhydramine hcl] Shortness Of Breath    Imitrex [sumatriptan succinate] Shortness Of Breath    Penicillins Shortness Of Breath     Other reaction(s): Jittery    Topamax [topiramate] Shortness Of Breath    Vancomycin Shortness Of Breath     Rash    Butorphanol tartrate     Darvocet a500 [propoxyphene n-acetaminophen]      Other reaction(s): Difficulty breathing    Fentanyl      Other reaction(s): Vomiting  Other reaction(s): Nausea  Other reaction(s): Itching  swelling    White petrolatum-zinc     Zinc oxide-white petrolatum      Other reaction(s): Difficulty breathing    Latex, natural rubber Itching and Rash

## 2018-06-09 LAB
ANION GAP SERPL CALC-SCNC: 8 MMOL/L
BASOPHILS # BLD AUTO: 0.02 K/UL
BASOPHILS NFR BLD: 0.4 %
BUN SERPL-MCNC: 10 MG/DL
CALCIUM SERPL-MCNC: 8.8 MG/DL
CHLORIDE SERPL-SCNC: 104 MMOL/L
CO2 SERPL-SCNC: 27 MMOL/L
CREAT SERPL-MCNC: 0.8 MG/DL
DIFFERENTIAL METHOD: ABNORMAL
EOSINOPHIL # BLD AUTO: 0.3 K/UL
EOSINOPHIL NFR BLD: 5.7 %
ERYTHROCYTE [DISTWIDTH] IN BLOOD BY AUTOMATED COUNT: 13.1 %
EST. GFR  (AFRICAN AMERICAN): >60 ML/MIN/1.73 M^2
EST. GFR  (NON AFRICAN AMERICAN): >60 ML/MIN/1.73 M^2
GLUCOSE SERPL-MCNC: 93 MG/DL
HCT VFR BLD AUTO: 38.6 %
HGB BLD-MCNC: 12.2 G/DL
LYMPHOCYTES # BLD AUTO: 1.8 K/UL
LYMPHOCYTES NFR BLD: 35.7 %
MAGNESIUM SERPL-MCNC: 2 MG/DL
MCH RBC QN AUTO: 27.2 PG
MCHC RBC AUTO-ENTMCNC: 31.6 G/DL
MCV RBC AUTO: 86 FL
MONOCYTES # BLD AUTO: 0.5 K/UL
MONOCYTES NFR BLD: 8.8 %
NEUTROPHILS # BLD AUTO: 2.5 K/UL
NEUTROPHILS NFR BLD: 49.4 %
PLATELET # BLD AUTO: 153 K/UL
PLATELET BLD QL SMEAR: ABNORMAL
PMV BLD AUTO: 10 FL
POTASSIUM SERPL-SCNC: 4.4 MMOL/L
RBC # BLD AUTO: 4.49 M/UL
SODIUM SERPL-SCNC: 139 MMOL/L
WBC # BLD AUTO: 5.12 K/UL

## 2018-06-09 PROCEDURE — 25000003 PHARM REV CODE 250: Performed by: HOSPITALIST

## 2018-06-09 PROCEDURE — 36415 COLL VENOUS BLD VENIPUNCTURE: CPT

## 2018-06-09 PROCEDURE — 11000001 HC ACUTE MED/SURG PRIVATE ROOM

## 2018-06-09 PROCEDURE — 80048 BASIC METABOLIC PNL TOTAL CA: CPT

## 2018-06-09 PROCEDURE — 83735 ASSAY OF MAGNESIUM: CPT

## 2018-06-09 PROCEDURE — 63600175 PHARM REV CODE 636 W HCPCS: Performed by: HOSPITALIST

## 2018-06-09 PROCEDURE — 85025 COMPLETE CBC W/AUTO DIFF WBC: CPT

## 2018-06-09 PROCEDURE — 94761 N-INVAS EAR/PLS OXIMETRY MLT: CPT

## 2018-06-09 PROCEDURE — 63600175 PHARM REV CODE 636 W HCPCS: Performed by: NURSE PRACTITIONER

## 2018-06-09 PROCEDURE — 25000003 PHARM REV CODE 250: Performed by: NURSE PRACTITIONER

## 2018-06-09 RX ORDER — AMOXICILLIN 250 MG
2 CAPSULE ORAL 2 TIMES DAILY
Status: DISCONTINUED | OUTPATIENT
Start: 2018-06-09 | End: 2018-06-12 | Stop reason: HOSPADM

## 2018-06-09 RX ORDER — ATROPA BELLADONNA AND OPIUM 16.2; 3 MG/1; MG/1
30 SUPPOSITORY RECTAL 2 TIMES DAILY
Status: DISCONTINUED | OUTPATIENT
Start: 2018-06-09 | End: 2018-06-09

## 2018-06-09 RX ORDER — SYRING-NEEDL,DISP,INSUL,0.3 ML 29 G X1/2"
296 SYRINGE, EMPTY DISPOSABLE MISCELLANEOUS ONCE
Status: DISCONTINUED | OUTPATIENT
Start: 2018-06-09 | End: 2018-06-09

## 2018-06-09 RX ADMIN — FLUOXETINE 60 MG: 20 CAPSULE ORAL at 08:06

## 2018-06-09 RX ADMIN — ENOXAPARIN SODIUM 40 MG: 100 INJECTION SUBCUTANEOUS at 08:06

## 2018-06-09 RX ADMIN — ATORVASTATIN CALCIUM 80 MG: 40 TABLET, FILM COATED ORAL at 09:06

## 2018-06-09 RX ADMIN — LACTOBACILLUS TAB 1 TABLET: TAB at 06:06

## 2018-06-09 RX ADMIN — LACTOBACILLUS TAB 1 TABLET: TAB at 08:06

## 2018-06-09 RX ADMIN — ENOXAPARIN SODIUM 40 MG: 100 INJECTION SUBCUTANEOUS at 09:06

## 2018-06-09 RX ADMIN — FAMOTIDINE 20 MG: 20 TABLET ORAL at 08:06

## 2018-06-09 RX ADMIN — MORPHINE SULFATE 2 MG: 4 INJECTION INTRAVENOUS at 09:06

## 2018-06-09 RX ADMIN — BUPROPION HYDROCHLORIDE 300 MG: 150 TABLET, FILM COATED, EXTENDED RELEASE ORAL at 08:06

## 2018-06-09 RX ADMIN — ASPIRIN 81 MG: 81 TABLET, COATED ORAL at 08:06

## 2018-06-09 RX ADMIN — LEVOTHYROXINE SODIUM 125 MCG: 25 TABLET ORAL at 05:06

## 2018-06-09 RX ADMIN — FUROSEMIDE 40 MG: 40 TABLET ORAL at 08:06

## 2018-06-09 RX ADMIN — ONDANSETRON 4 MG: 2 INJECTION INTRAMUSCULAR; INTRAVENOUS at 08:06

## 2018-06-09 RX ADMIN — STANDARDIZED SENNA CONCENTRATE AND DOCUSATE SODIUM 2 TABLET: 8.6; 5 TABLET, FILM COATED ORAL at 09:06

## 2018-06-09 RX ADMIN — HYDROCODONE BITARTRATE AND ACETAMINOPHEN 1 TABLET: 10; 325 TABLET ORAL at 12:06

## 2018-06-09 RX ADMIN — CLOPIDOGREL BISULFATE 75 MG: 75 TABLET ORAL at 08:06

## 2018-06-09 RX ADMIN — MORPHINE SULFATE 2 MG: 4 INJECTION INTRAVENOUS at 03:06

## 2018-06-09 RX ADMIN — SENNOSIDES 17.2 MG: 8.6 TABLET, FILM COATED ORAL at 12:06

## 2018-06-09 RX ADMIN — HYDROCODONE BITARTRATE AND ACETAMINOPHEN 1 TABLET: 10; 325 TABLET ORAL at 04:06

## 2018-06-09 RX ADMIN — TRAZODONE HYDROCHLORIDE 200 MG: 100 TABLET ORAL at 09:06

## 2018-06-09 RX ADMIN — QUETIAPINE FUMARATE 100 MG: 100 TABLET ORAL at 09:06

## 2018-06-09 RX ADMIN — HYDROCODONE BITARTRATE AND ACETAMINOPHEN 1 TABLET: 10; 325 TABLET ORAL at 06:06

## 2018-06-09 RX ADMIN — MORPHINE SULFATE 2 MG: 4 INJECTION INTRAVENOUS at 08:06

## 2018-06-09 RX ADMIN — PANTOPRAZOLE SODIUM 40 MG: 40 TABLET, DELAYED RELEASE ORAL at 08:06

## 2018-06-09 RX ADMIN — POTASSIUM CHLORIDE 40 MEQ: 1500 TABLET, EXTENDED RELEASE ORAL at 08:06

## 2018-06-09 RX ADMIN — LACTOBACILLUS TAB 1 TABLET: TAB at 12:06

## 2018-06-09 RX ADMIN — OXYBUTYNIN CHLORIDE 5 MG: 5 TABLET, EXTENDED RELEASE ORAL at 08:06

## 2018-06-09 RX ADMIN — SODIUM CHLORIDE 500 ML: 9 INJECTION, SOLUTION INTRAVENOUS at 04:06

## 2018-06-09 RX ADMIN — FUROSEMIDE 40 MG: 40 TABLET ORAL at 09:06

## 2018-06-09 RX ADMIN — CEFTRIAXONE 1 G: 1 INJECTION, POWDER, FOR SOLUTION INTRAMUSCULAR; INTRAVENOUS at 11:06

## 2018-06-09 NOTE — PLAN OF CARE
Problem: Patient Care Overview  Goal: Plan of Care Review  Outcome: Ongoing (interventions implemented as appropriate)  Plan of care reviewed with patient, understanding verbalized. Pt remains Sinus Ilya on tele 40's-50's . Bed alarm on, call light in reach, fall precautions in place. Will continue to monitor.

## 2018-06-09 NOTE — PLAN OF CARE
Pt B/P 78/40. Pt asymptomatic.  Dr Khan notified. No new orders received. Will continue to monitor.

## 2018-06-09 NOTE — SUBJECTIVE & OBJECTIVE
Interval History: Still with lower abdominal pain/pressure. No BM and refused mag citrate this AM.     Review of Systems  Objective:     Vital Signs (Most Recent):  Temp: 98.2 °F (36.8 °C) (06/09/18 1616)  Pulse: (!) 58 (06/09/18 1636)  Resp: 18 (06/09/18 1616)  BP: (!) 93/50 (06/09/18 1616)  SpO2: 98 % (06/09/18 1321) Vital Signs (24h Range):  Temp:  [97.8 °F (36.6 °C)-98.3 °F (36.8 °C)] 98.2 °F (36.8 °C)  Pulse:  [48-64] 58  Resp:  [16-18] 18  SpO2:  [97 %-98 %] 98 %  BP: ()/(40-65) 93/50     Weight: 69.4 kg (153 lb)  Body mass index is 23.96 kg/m².    Intake/Output Summary (Last 24 hours) at 06/09/18 1713  Last data filed at 06/09/18 1532   Gross per 24 hour   Intake              805 ml   Output             4400 ml   Net            -3595 ml      Physical Exam    Significant Labs:   CBC:   Recent Labs  Lab 06/07/18  2250 06/09/18  0400   WBC 10.58 5.12   HGB 12.4 12.2   HCT 39.0 38.6    153     CMP:   Recent Labs  Lab 06/07/18  2250 06/08/18  0343 06/09/18  0400   * 135* 139   K 3.7 3.5 4.4   CL 96 100 104   CO2 30* 25 27   GLU 91 97 93   BUN 18 15 10   CREATININE 1.5* 1.2 0.8   CALCIUM 9.8 9.1 8.8   PROT 7.7  --   --    ALBUMIN 4.1  --   --    BILITOT 0.5  --   --    ALKPHOS 131  --   --    AST 16  --   --    ALT 10  --   --    ANIONGAP 8 10 8   EGFRNONAA 37* 49* >60     Magnesium:   Recent Labs  Lab 06/08/18  0343 06/09/18  0400   MG 1.9 2.0     Urine Culture:   Recent Labs  Lab 06/08/18  0127   LABURIN PRESUMPTIVE PROTEUS SPECIES>100,000 cfu/mlIdentification and susceptibility pending       Significant Imaging: I have reviewed all pertinent imaging results/findings within the past 24 hours.

## 2018-06-09 NOTE — PLAN OF CARE
Problem: Patient Care Overview  Goal: Plan of Care Review  Outcome: Ongoing (interventions implemented as appropriate)  POC reviewed with patient. Patient AAOx4 and reports lower abdominal pain. PRN pain medication administered. Nausea reported during shift, PRN zofran administered. Suprapubic catheter in place draining clear yellow urine. Xray of abdomen completed showing copious stool in ascending colon. Stool softer administered to patient. Last BM was on 6/5/18. Tele monitor on patient reading sinus riley/NSR, HR 40s-70s. No red alarms or ectopy noted. Bed alarm on, bed locked and low, side rails up x2, and call bell in reach. Patient verbalizes clear understanding of POC.

## 2018-06-10 PROBLEM — N12 PYELONEPHRITIS: Status: RESOLVED | Noted: 2018-06-08 | Resolved: 2018-06-10

## 2018-06-10 PROBLEM — N17.9 AKI (ACUTE KIDNEY INJURY): Status: RESOLVED | Noted: 2018-06-08 | Resolved: 2018-06-10

## 2018-06-10 LAB
ANION GAP SERPL CALC-SCNC: 6 MMOL/L
BASOPHILS # BLD AUTO: 0.01 K/UL
BASOPHILS NFR BLD: 0.2 %
BUN SERPL-MCNC: 9 MG/DL
CALCIUM SERPL-MCNC: 8.9 MG/DL
CHLORIDE SERPL-SCNC: 101 MMOL/L
CO2 SERPL-SCNC: 32 MMOL/L
CREAT SERPL-MCNC: 0.8 MG/DL
DIFFERENTIAL METHOD: ABNORMAL
EOSINOPHIL # BLD AUTO: 0.2 K/UL
EOSINOPHIL NFR BLD: 3.4 %
ERYTHROCYTE [DISTWIDTH] IN BLOOD BY AUTOMATED COUNT: 13.1 %
EST. GFR  (AFRICAN AMERICAN): >60 ML/MIN/1.73 M^2
EST. GFR  (NON AFRICAN AMERICAN): >60 ML/MIN/1.73 M^2
GLUCOSE SERPL-MCNC: 94 MG/DL
HCT VFR BLD AUTO: 36.2 %
HGB BLD-MCNC: 11.5 G/DL
LYMPHOCYTES # BLD AUTO: 2.1 K/UL
LYMPHOCYTES NFR BLD: 33.4 %
MAGNESIUM SERPL-MCNC: 1.8 MG/DL
MCH RBC QN AUTO: 27.1 PG
MCHC RBC AUTO-ENTMCNC: 31.8 G/DL
MCV RBC AUTO: 85 FL
MONOCYTES # BLD AUTO: 0.7 K/UL
MONOCYTES NFR BLD: 11.2 %
NEUTROPHILS # BLD AUTO: 3.2 K/UL
NEUTROPHILS NFR BLD: 51.6 %
PLATELET # BLD AUTO: 188 K/UL
PMV BLD AUTO: 9.8 FL
POTASSIUM SERPL-SCNC: 3.6 MMOL/L
RBC # BLD AUTO: 4.25 M/UL
SODIUM SERPL-SCNC: 139 MMOL/L
WBC # BLD AUTO: 6.14 K/UL

## 2018-06-10 PROCEDURE — 25000003 PHARM REV CODE 250: Performed by: NURSE PRACTITIONER

## 2018-06-10 PROCEDURE — 83735 ASSAY OF MAGNESIUM: CPT

## 2018-06-10 PROCEDURE — 25000003 PHARM REV CODE 250: Performed by: HOSPITALIST

## 2018-06-10 PROCEDURE — G8979 MOBILITY GOAL STATUS: HCPCS | Mod: CJ

## 2018-06-10 PROCEDURE — 63600175 PHARM REV CODE 636 W HCPCS: Performed by: NURSE PRACTITIONER

## 2018-06-10 PROCEDURE — 97161 PT EVAL LOW COMPLEX 20 MIN: CPT

## 2018-06-10 PROCEDURE — 36415 COLL VENOUS BLD VENIPUNCTURE: CPT

## 2018-06-10 PROCEDURE — 85025 COMPLETE CBC W/AUTO DIFF WBC: CPT

## 2018-06-10 PROCEDURE — 97165 OT EVAL LOW COMPLEX 30 MIN: CPT

## 2018-06-10 PROCEDURE — 63600175 PHARM REV CODE 636 W HCPCS: Performed by: HOSPITALIST

## 2018-06-10 PROCEDURE — 80048 BASIC METABOLIC PNL TOTAL CA: CPT

## 2018-06-10 PROCEDURE — 94761 N-INVAS EAR/PLS OXIMETRY MLT: CPT

## 2018-06-10 PROCEDURE — G8978 MOBILITY CURRENT STATUS: HCPCS | Mod: CL

## 2018-06-10 PROCEDURE — 25500020 PHARM REV CODE 255: Performed by: HOSPITALIST

## 2018-06-10 PROCEDURE — 11000001 HC ACUTE MED/SURG PRIVATE ROOM

## 2018-06-10 PROCEDURE — S0077 INJECTION, CLINDAMYCIN PHOSP: HCPCS | Performed by: HOSPITALIST

## 2018-06-10 RX ORDER — CLINDAMYCIN PHOSPHATE 600 MG/50ML
600 INJECTION, SOLUTION INTRAVENOUS
Status: DISCONTINUED | OUTPATIENT
Start: 2018-06-10 | End: 2018-06-11

## 2018-06-10 RX ORDER — LACTULOSE 10 G/15ML
20 SOLUTION ORAL 3 TIMES DAILY
Status: COMPLETED | OUTPATIENT
Start: 2018-06-10 | End: 2018-06-10

## 2018-06-10 RX ORDER — BISACODYL 5 MG
10 TABLET, DELAYED RELEASE (ENTERIC COATED) ORAL ONCE
Status: COMPLETED | OUTPATIENT
Start: 2018-06-10 | End: 2018-06-10

## 2018-06-10 RX ADMIN — TRAZODONE HYDROCHLORIDE 200 MG: 100 TABLET ORAL at 08:06

## 2018-06-10 RX ADMIN — ENOXAPARIN SODIUM 40 MG: 100 INJECTION SUBCUTANEOUS at 08:06

## 2018-06-10 RX ADMIN — ASPIRIN 81 MG: 81 TABLET, COATED ORAL at 08:06

## 2018-06-10 RX ADMIN — POTASSIUM CHLORIDE 40 MEQ: 1500 TABLET, EXTENDED RELEASE ORAL at 08:06

## 2018-06-10 RX ADMIN — LACTOBACILLUS TAB 1 TABLET: TAB at 08:06

## 2018-06-10 RX ADMIN — IOHEXOL 30 ML: 350 INJECTION, SOLUTION INTRAVENOUS at 11:06

## 2018-06-10 RX ADMIN — HYDROCODONE BITARTRATE AND ACETAMINOPHEN 1 TABLET: 10; 325 TABLET ORAL at 05:06

## 2018-06-10 RX ADMIN — QUETIAPINE FUMARATE 100 MG: 100 TABLET ORAL at 08:06

## 2018-06-10 RX ADMIN — CLINDAMYCIN IN 5 PERCENT DEXTROSE 600 MG: 12 INJECTION, SOLUTION INTRAVENOUS at 04:06

## 2018-06-10 RX ADMIN — FAMOTIDINE 20 MG: 20 TABLET ORAL at 08:06

## 2018-06-10 RX ADMIN — LEVOTHYROXINE SODIUM 125 MCG: 25 TABLET ORAL at 05:06

## 2018-06-10 RX ADMIN — BISACODYL 10 MG: 5 TABLET, DELAYED RELEASE ORAL at 12:06

## 2018-06-10 RX ADMIN — FLUOXETINE 60 MG: 20 CAPSULE ORAL at 08:06

## 2018-06-10 RX ADMIN — STANDARDIZED SENNA CONCENTRATE AND DOCUSATE SODIUM 2 TABLET: 8.6; 5 TABLET, FILM COATED ORAL at 08:06

## 2018-06-10 RX ADMIN — LACTOBACILLUS TAB 1 TABLET: TAB at 04:06

## 2018-06-10 RX ADMIN — OXYBUTYNIN CHLORIDE 5 MG: 5 TABLET, EXTENDED RELEASE ORAL at 08:06

## 2018-06-10 RX ADMIN — FUROSEMIDE 40 MG: 40 TABLET ORAL at 08:06

## 2018-06-10 RX ADMIN — MORPHINE SULFATE 2 MG: 4 INJECTION INTRAVENOUS at 10:06

## 2018-06-10 RX ADMIN — CLOPIDOGREL BISULFATE 75 MG: 75 TABLET ORAL at 08:06

## 2018-06-10 RX ADMIN — CLINDAMYCIN IN 5 PERCENT DEXTROSE 600 MG: 12 INJECTION, SOLUTION INTRAVENOUS at 08:06

## 2018-06-10 RX ADMIN — BUPROPION HYDROCHLORIDE 300 MG: 150 TABLET, FILM COATED, EXTENDED RELEASE ORAL at 08:06

## 2018-06-10 RX ADMIN — LACTULOSE 20 G: 20 SOLUTION ORAL at 03:06

## 2018-06-10 RX ADMIN — ATORVASTATIN CALCIUM 80 MG: 40 TABLET, FILM COATED ORAL at 08:06

## 2018-06-10 RX ADMIN — MORPHINE SULFATE 2 MG: 4 INJECTION INTRAVENOUS at 09:06

## 2018-06-10 RX ADMIN — LACTULOSE 20 G: 20 SOLUTION ORAL at 08:06

## 2018-06-10 RX ADMIN — CEFTRIAXONE 1 G: 1 INJECTION, POWDER, FOR SOLUTION INTRAMUSCULAR; INTRAVENOUS at 10:06

## 2018-06-10 RX ADMIN — IOHEXOL 75 ML: 350 INJECTION, SOLUTION INTRAVENOUS at 01:06

## 2018-06-10 RX ADMIN — PANTOPRAZOLE SODIUM 40 MG: 40 TABLET, DELAYED RELEASE ORAL at 08:06

## 2018-06-10 RX ADMIN — HYDROCODONE BITARTRATE AND ACETAMINOPHEN 1 TABLET: 10; 325 TABLET ORAL at 12:06

## 2018-06-10 NOTE — PT/OT/SLP EVAL
Physical Therapy Evaluation    Patient Name:  Tasha Hawley   MRN:  [id number]    Recommendations:     Discharge Recommendations:  home health PT   Discharge Equipment Recommendations: none   Barriers to discharge: None    Assessment:     Tasha Hawley is a 62 y.o. female admitted with a medical diagnosis of Recurrent UTI (urinary tract infection).  She presents with the following impairments/functional limitations:  weakness, impaired endurance, impaired self care skills, impaired functional mobilty, gait instability, impaired balance, decreased coordination, decreased upper extremity function, decreased lower extremity function, decreased safety awareness, pain, impaired cardiopulmonary response to activity .    Rehab Prognosis:  Fair; patient would benefit from acute skilled PT services to address these deficits and reach maximum level of function.      Recent Surgery: * No surgery found *      Plan:     During this hospitalization, patient to be seen 5 x/week to address the above listed problems via gait training, therapeutic activities, therapeutic exercises  · Plan of Care Expires:  06/24/18   Plan of Care Reviewed with: patient    Subjective     Communicated with nsg prior to session.  Patient found HOB elevated upon PT entry to room, agreeable to evaluation.      Chief Complaint: pain  Patient comments/goals: decreased pain, states that she is not ready to go home  Pain/Comfort:  · Pain Rating 1: 9/10  · Location 1: abdomen  · Pain Addressed 1: Pre-medicate for activity, Reposition, Distraction, Cessation of Activity  · Pain Rating Post-Intervention 1: 9/10    Patients cultural, spiritual, Christianity conflicts given the current situation: none    Living Environment:  Pt lives with spouse in a [facility] with ramp to enter  Prior to admission, patients level of function was required assist.  Patient has the following equipment: walker, rolling, wheelchair, bath bench, bedside commode.  DME owned (not  currently used): single point cane.  Upon discharge, patient will have assistance from spouse.    Objective:     Patient found with: telemetry, SCD     General Precautions: Standard, fall   Orthopedic Precautions:N/A   Braces: N/A     Exams:  · Cognitive Exam:  Patient is oriented to Person, Place, Time and Situation and follows 100% of 2-step commands   · Gross Motor Coordination:  impaired 2/2 weakness and pain  · Postural Exam:  Patient presented with the following abnormalities:    · -       Rounded shoulders  · -       Forward head  · Sensation:    · -       Decreased to  light/touch B Le's  · Skin Integrity/Edema:      · -       Skin integrity: Visible skin intact  · RLE ROM: WFL  · RLE Strength: WFL except dflx 4-/5  · LLE ROM: WFL  · LLE Strength: WFL except 4-/5    Functional Mobility:  · Bed Mobility:     · Scooting: stand by assistance  · Supine to Sit: minimum assistance  · Sit to Supine: maximal assistance  · Transfers:     · Sit to Stand:  minimum assistance with rollator and VC for hadn placement and safety  · Gait: 4 steps forward/backward/right  · Balance: Fair+ sit, fair stand     AM-PAC 6 CLICK MOBILITY  Total Score:13       Therapeutic Activities and Exercises:   eval only    Patient left HOB elevated with all lines intact, call button in reach, bed alarm on and nsg notified.    GOALS:    Physical Therapy Goals        Problem: Physical Therapy Goal    Goal Priority Disciplines Outcome Goal Variances Interventions   Physical Therapy Goal     PT/OT, PT Ongoing (interventions implemented as appropriate)     Description:  Goals to be met by: 2019     Patient will increase functional independence with mobility by performin. Supine to sit with Modified [place]  2. Sit to stand transfer with Minimal Assistance  3. Gait  x 25 feet with Minimal Assistance using Rolling Walker.   4. Lower extremity exercise program x10 reps per handout, with supervision                      History:      Past Medical History:   Diagnosis Date    Anticoagulant long-term use     Anxiety     Arthritis     Bilateral lower extremity edema     severe chronic    Carotid artery occlusion     Cataract     Depression     Fever blister     Hypothyroid     Iron deficiency anemia     Lumbar radiculopathy     with chronic pain    Ocular migraine     Sleep apnea     cpap       Past Surgical History:   Procedure Laterality Date    BACK SURGERY      x 12    CATARACT EXTRACTION W/  INTRAOCULAR LENS IMPLANT Left     Dr Coleman     ESOPHAGOGASTRODUODENOSCOPY N/A 5/23/2018    Procedure: ESOPHAGOGASTRODUODENOSCOPY (EGD);  Surgeon: Prince Vance MD;  Location: [address]);  Service: Endoscopy;  Laterality: N/A;  r/s 'd per Dr. Vance due to family emergency- ER    HYSTERECTOMY  1975    endometriosis    pain pump placement      SQ Dilaudid Pump managed by Dr. Hillman, Pain Management    SPINAL CORD STIMULATOR REMOVAL      before [place]    SPINE SURGERY  5-13-13    CERVICAL FUSION       Clinical Decision Making:     History  Co-morbidities and personal factors that may impact the plan of care Examination  Body Structures and Functions, activity limitations and participation restrictions that may impact the plan of care Clinical Presentation   Decision Making/ Complexity Score   Co-morbidities:   [] Time since onset of injury / illness / exacerbation  [] Status of current condition  []Patient's cognitive status and safety concerns    [] Multiple Medical Problems (see med hx)  Personal Factors:   [] Patient's age  [] Prior Level of function   [] Patient's home situation (environment and family support)  [] Patient's level of motivation  [] Expected progression of patient      HISTORY:(criteria)    [] 75040 - no personal factors/history    [] 84358 - has 1-2 personal factor/comorbidity     [] 66903 - has >3 personal factor/comorbidity     Body Regions:  [] Objective examination findings  [] Head     []   Neck  [] Trunk   [] Upper Extremity  [] Lower Extremity    Body Systems:  [] For communication ability, affect, cognition, language, and learning style: the assessment of the ability to make needs known, consciousness, orientation (person, place, and time), expected emotional /behavioral responses, and learning preferences (eg, learning barriers, education  needs)  [] For the neuromuscular system: a general assessment of gross coordinated movement (eg, balance, gait, locomotion, transfers, and transitions) and motor function  (motor control and motor learning)  [] For the musculoskeletal system: the assessment of gross symmetry, gross range of motion, gross strength, height, and weight  [] For the integumentary system: the assessment of pliability(texture), presence of scar formation, skin color, and skin integrity  [] For cardiovascular/pulmonary system: the assessment of heart rate, respiratory rate, blood pressure, and edema     Activity limitations:    [] Patient's cognitive status and saf ety concerns          [] Status of current condition      [] Weight bearing restriction  [] Cardiopulmunary Restriction    Participation Restrictions:   [] Goals and goal agreement with the patient     [] Rehab potential (prognosis) and probable outcome      Examination of Body System: (criteria)    [] 35441 - addressing 1-2 elements    [] 30054 - addressing a total of 3 or more elements     [] 96314 -  Addressing a total of 4 or more elements         Clinical Presentation: (criteria)  Choose one     On examination of body system using standardized tests and measures patient presents with (CHOOSE ONE) elements from any of the following: body structures and functions, activity limitations, and/or participation restrictions.  Leading to a clinical presentation that is considered (CHOOSE ONE)                              Clinical Decision Making  (Eval Complexity):  Choose One     Time Tracking:     PT Received On: 06/10/18  PT  Start Time: 1034     PT Stop Time: 1048  PT Total Time (min): 14 min     Billable Minutes: Evaluation 14 co-treat with OT      Margaret Conteh, PT  06/10/2018

## 2018-06-10 NOTE — PLAN OF CARE
Problem: Physical Therapy Goal  Goal: Physical Therapy Goal  Goals to be met by: 2019     Patient will increase functional independence with mobility by performin. Supine to sit with Modified [place]  2. Sit to stand transfer with Minimal Assistance  3. Gait  x 25 feet with Minimal Assistance using Rolling Walker.   4. Lower extremity exercise program x10 reps per handout, with supervision    Outcome: Ongoing (interventions implemented as appropriate)  Initial PT evaluation performed.  Pt could benefit from skilled PT services 5x/wk in order to maximize function prior to D/C.  HHPT recommended

## 2018-06-10 NOTE — PLAN OF CARE
Problem: Patient Care Overview  Goal: Plan of Care Review  Outcome: Ongoing (interventions implemented as appropriate)  Plan of care reviewed w/pt.  Pt verbalized understanding.  Laxatives given per orders.  Still no BM.  Pt instructed to let staff know when needing to have BM.  Pain management ongoing.  Fall/safety precautions maintained.  Bed in low position and locked.  Call light within reach.  Slip resistant socks on.  Bed alarm on.  Nurse instructed pt to notify staff for assistance.  Pt verbalized understanding.  Pt stable.  PIV clean, dry and intact.  No signs of distress noted.  Pt on tele.  SB to SR w/HR 46 to 65.

## 2018-06-10 NOTE — ASSESSMENT & PLAN NOTE
Neurogenic bladder  Suprapubic catheter  Appreciate assistance from Dr. Villalba for SPT exchange. UA and culture post exchange were still positive. UCx from admission with Proteus, but UCx after exchange with Staph aureus. She has recurrent PROTEUS MIRABILIS urinary tract infections (resistant to cipro).  Continue Ceftriaxone. Will start on clindamycin as well for the Staph. Continues to complain of significant abdominal pain, so will repeat CT scan today.

## 2018-06-10 NOTE — PROGRESS NOTES
[facility]  Progress Note    Patient Name: Tasha Hawley  MRN: [id number]  Patient Class: IP- Inpatient   Admission Date: 6/7/2018  Length of Stay: 1 days  Attending Physician: Kwame Concepcion MD  Primary Care Provider: Mesfin Hodges Ii, MD        Subjective:     Principal Problem:Recurrent UTI (urinary tract infection)    HPI:  Tasha Hawley is a 61 y.o. white woman with hypothyroidism, coronary artery disease with history of acute coronary syndrome in January 2016 and ischemic cardiomyopathy (ejection fraction since improved), thoracic aortic atherosclerosis, severe left atrial enlargement, diastolic dysfunction, gastroesophageal reflux disease status post Nissen fundoplication, right facial droop since birth, history of work-related back injury in the 1990s with scoliosis, lumbar degenerative disc disease, lumbar facet arthropathy, history of multiple fusions and spacers from L3-S1, status post spinal cord stimulator and intrathecal hydromorphone pump, chronic constipation, neurogenic bladder status post suprapubic catheter placement, recurrent PROTEUS MIRABILIS urinary tract infections (resistant to cipro), obstructive sleep apnea, lower extremity lymphedema, chronic insomnia, and depression.  She has difficulty ambulating at baseline.  She lives in [place], [place].  She is .  Her primary care physician is Dr. Mesfin Hodges.  Her pain management specialist is Dr. Nigel Hillman.  Her urologist is Dr. Shireen Mayo.  Her gastroenterologist is Dr. Prince Vance.  Her psychiatrist is Dr. Erik Oconnor.  She has many antibiotic allergies. She can tolerate cephalosporins.    She presented to [facility] ED due to intractable vomiting starting yesterday morning. She has been unable to retain any liquids or solids. She has associated abdominal pain but denies any blood in vomit, fever or chills. She had episodes of diarrhea yesterday but none today. She was  seen at an urgent care today for symptoms and was told she had an UTI and referred to the ED for further evaluation. She admits to having blood in urine of catheter bag.  She is afebrile with no leukocytosis.  She has a bump in her BUN 18 (up from 7), and Creatinine 1.5 (up from 0.7).  Her shows significant UTI with + Nitrites, RBC > 100,  with clumps and moderate bacteria. Suprapubic cath last changed on 5/25/18.  She was given 1 liter of normal saline, zofran, and 1 gram of ceftriaxone. She is admitted to [facility].        Hospital Course:  No notes on file    Interval History: Still with lower abdominal pain/pressure. No BM and refused mag citrate this AM.     Review of Systems   Constitutional: Negative for chills and fever.   Respiratory: Negative for cough and shortness of breath.    Cardiovascular: Negative for chest pain and palpitations.   Gastrointestinal: Negative for nausea and vomiting.     Objective:     Vital Signs (Most Recent):  Temp: 98.2 °F (36.8 °C) (06/09/18 1616)  Pulse: (!) 58 (06/09/18 1636)  Resp: 18 (06/09/18 1616)  BP: (!) 93/50 (06/09/18 1616)  SpO2: 98 % (06/09/18 1321) Vital Signs (24h Range):  Temp:  [97.8 °F (36.6 °C)-98.3 °F (36.8 °C)] 98.2 °F (36.8 °C)  Pulse:  [48-64] 58  Resp:  [16-18] 18  SpO2:  [97 %-98 %] 98 %  BP: ()/(40-65) 93/50     Weight: 69.4 kg (153 lb)  Body mass index is 23.96 kg/m².    Intake/Output Summary (Last 24 hours) at 06/09/18 1713  Last data filed at 06/09/18 1532   Gross per 24 hour   Intake              805 ml   Output             4400 ml   Net            -3595 ml      Physical Exam  Constitutional: She is oriented to person, place, and time. She appears well-developed. No distress.   Cardiovascular: Normal rate and regular rhythm.    No murmur heard.  Pulmonary/Chest: Effort normal and breath sounds normal. No respiratory distress. She has no wheezes.   Abdominal: Soft. Bowel sounds are normal. She exhibits no distension.  There is tenderness.   SPT in place   Musculoskeletal: She exhibits no edema.   Neurological: She is alert and oriented to person, place, and time.   Skin: Skin is warm and dry.   Psychiatric: She has a normal mood and affect. Her behavior is normal.   Nursing note and vitals reviewed.    Significant Labs:   CBC:   Recent Labs  Lab 06/07/18 2250 06/09/18  0400   WBC 10.58 5.12   HGB 12.4 12.2   HCT 39.0 38.6    153     CMP:   Recent Labs  Lab 06/07/18  2250 06/08/18  0343 06/09/18  0400   * 135* 139   K 3.7 3.5 4.4   CL 96 100 104   CO2 30* 25 27   GLU 91 97 93   BUN 18 15 10   CREATININE 1.5* 1.2 0.8   CALCIUM 9.8 9.1 8.8   PROT 7.7  --   --    ALBUMIN 4.1  --   --    BILITOT 0.5  --   --    ALKPHOS 131  --   --    AST 16  --   --    ALT 10  --   --    ANIONGAP 8 10 8   EGFRNONAA 37* 49* >60     Magnesium:   Recent Labs  Lab 06/08/18 0343 06/09/18  0400   MG 1.9 2.0     Urine Culture:   Recent Labs  Lab 06/08/18  0127   LABURIN PRESUMPTIVE PROTEUS SPECIES>100,000 cfu/mlIdentification and susceptibility pending       Significant Imaging: I have reviewed all pertinent imaging results/findings within the past 24 hours.    Assessment/Plan:      * Recurrent UTI (urinary tract infection)    Neurogenic bladder  Suprapubic catheter  Appreciate assistance from Dr. Villalba for SPT exchange. UA and culture post exchange were still positive. UCx with presumptive Proteus. She has recurrent PROTEUS MIRABILIS urinary tract infections (resistant to cipro).  Continue Ceftriaxone. Had CT abd/pelvis last month that did not show any nephrolitasis or other structural issues.         CARITO (acute kidney injury)    Creatinine is down to 0.8 today. Due to volume depletion from vomiting. Strict Intake and Output, Avoid nephrotoxins, and renally dose medications.        Intractable nausea and vomiting    PRN Nausea Medication.           Chronic combined systolic and diastolic congestive heart failure    Bilateral carotid artery  disease  Coronary artery disease involving native coronary artery of native heart without angina pectoris  8/21/17 Echo, Normal EF + DD  Continue home aspirin 81 mg daily, clopidogrel 75 mg daily            Lymphedema of both lower extremities    Improving. Wearing 2 walking boots. Continue home furosemide  40 mg BID and Potassium 20 mequ daily        Primary hypothyroidism    Continue home levothyroxine 125 mcg daily. TSH is 1.225.         GERD (gastroesophageal reflux disease)    Continue home pepcid 20 mg daily          Chronic pain syndrome    Cervical myelopathy  Degenerative disc disease, lumbar  S/P insertion of spinal cord stimulator  S/P insertion of intrathecal pump (Continious Dilaudid Infusion)  Unsure of Dilaudid infusion dose.  Continue home medications: PRN tizanidine 4 mg TID prn, Hydrocodone-APAP  mg every 4 hours PRN. Give pericolace today to help with constipation.           Iron deficiency anemia    Hgb is stable at 12.         Generalized anxiety disorder    Major depressive disorder, recurrent, mild  Continue home Trazadone 200 mg daily, Fluoxetine 60 mg daily, bupropion 24 hr 300 mg daily            VTE Risk Mitigation         Ordered     enoxaparin injection 40 mg  Every 12 hours      06/08/18 0254     IP VTE HIGH RISK PATIENT  Once      06/08/18 0254              Kwame Concepcion MD  Department of Hospital Medicine   [facility]

## 2018-06-10 NOTE — PLAN OF CARE
Problem: Occupational Therapy Goal  Goal: Occupational Therapy Goal  Goals to be met by: 06/24/18     Patient will increase functional independence with ADLs by performing:    UE Dressing with Set-up Assistance.  LE Dressing with Moderate Assistance.  Grooming while seated with Modified [place].  Upper extremity exercise program 2 sets x15 reps per handout, with supervision.    Outcome: Ongoing (interventions implemented as appropriate)    Pt was agreeable to OT/PT evaluation.  Goals established to assist pt with returning to PLOF regarding ADLs and func mobility.  Pt will benefit from skilled OT services in order to increase her level of safety and independence with ADLs and mobility.      Lisa Cartwright, OT  6/10/2018

## 2018-06-10 NOTE — PT/OT/SLP EVAL
Occupational Therapy   Evaluation    Name: Tasha Hawley  MRN: [id number]  Admitting Diagnosis:  Recurrent UTI (urinary tract infection)      Recommendations:     Discharge Recommendations: home health OT  Discharge Equipment Recommendations:  none  Barriers to discharge:  None    History:     Occupational Profile:  Living Environment: Pt lives in [facility] with 1 step to enter - tub/shower combo  Previous level of function: Pt needs assistance with LB ADLs and mobility with AD - unable to drive  Roles and Routines: on disability   Equipment Owned:  cane, straight, walker, rolling, wheelchair, bath bench, bedside commode (scooter)  Assistance upon Discharge: 24/7 assist from spouse    Past Medical History:   Diagnosis Date    Anticoagulant long-term use     Anxiety     Arthritis     Bilateral lower extremity edema     severe chronic    Carotid artery occlusion     Cataract     Depression     Fever blister     Hypothyroid     Iron deficiency anemia     Lumbar radiculopathy     with chronic pain    Ocular migraine     Sleep apnea     cpap       Past Surgical History:   Procedure Laterality Date    BACK SURGERY      x 12    CATARACT EXTRACTION W/  INTRAOCULAR LENS IMPLANT Left     Dr Coleman     ESOPHAGOGASTRODUODENOSCOPY N/A 5/23/2018    Procedure: ESOPHAGOGASTRODUODENOSCOPY (EGD);  Surgeon: Prince Vance MD;  Location: [address];  Service: Endoscopy;  Laterality: N/A;  r/s 'd per Dr. Vance due to family emergency- ER    HYSTERECTOMY  1975    endometriosis    pain pump placement      SQ Dilaudid Pump managed by Dr. Hillman, Pain Management    SPINAL CORD STIMULATOR REMOVAL      before [place]    SPINE SURGERY  5-13-13    CERVICAL FUSION       Subjective     Chief Complaint: pain  Patient/Family stated goals: pain relief   Communicated with: nurse prior to session.  Pain/Comfort:  · Pain Rating 1: 9/10  · Location - Orientation 1: generalized  · Location 1: abdomen  · Pain Addressed 1:  "Pre-medicate for activity, Reposition, Cessation of Activity  · Pain Rating Post-Intervention 1: 9/10    Patients cultural, spiritual, Ep[employer]opalian conflicts given the current situation:      Objective:     Patient found with: telemetry, SCD, peripheral IV (suprapubic catheter)    General Precautions: Standard, fall   Orthopedic Precautions:N/A   Braces:  ("walking" boots when standing)     Occupational Performance:    Bed Mobility:    · Patient completed Rolling/Turning to Right with contact guard assistance  · Patient completed Scooting/Bridging with moderate assistance  · Patient completed Supine to Sit with contact guard assistance  · Patient completed Sit to Supine with maximal assistance and A with trunk and LEs    Functional Mobility/Transfers:  · Patient completed Sit <> Stand Transfer with minimum assistance  with  rolling walker   · Functional Mobility: Pt able to take a few steps forward and back using Rollator with PT - required CGA - refer to PT jo ann for further information regarding gait    Activities of Daily Living:  · Grooming: supervision set up A  · UB Dressing: minimum assistance to [facility] gown  as robe  · LB Dressing: total assistance to vernell/doff "walking boot" - sitting EOB    Cognitive/Visual Perceptual:  Cognitive/Psychosocial Skills:  -       Oriented to: Person, Place, Time and Situation   -       Follows Commands/attention:Easily distracted and difficulty following intructions for side humble  -       Communication: clear/fluent  -       Memory: No Deficits noted  -       Safety awareness/insight to disability: impaired   -       Mood/Affect/Coping skills/emotional control: Appropriate to situation - pt apologetic for not doing more during evaluation    Physical Exam:  Balance: -       sitting EOB = WFL; standing = needs CGA  Postural examination/scapula alignment: -       Rounded shoulders  -       Forward head  Skin integrity: Visible skin intact  Edema:  Moderate LEs  Sensation: -  " "     Impaired  light/touch LEs and UEs  Dominant hand: -       R handed  Upper Extremity Range of Motion:   WFL (shoulders past 90 degrees - WFL for ADLs)  Upper Extremity Strength:  Fair + (R stronger then left)   Strength:  Fair +  Fine Motor Coordination: -       Intact  Gross motor coordination: WFL    Patient left HOB elevated with all lines intact and call button in reach    AMPA 6 Click:  AMPA Total Score: 14    Treatment & Education:  · Pt completed ADLs and func mobility activities for tx session as noted above  · Pt educated on role of OT and POC    Education:    Assessment:     Tasha Hawley is a 62 y.o. female with a medical diagnosis of Recurrent UTI (urinary tract infection).  She presents with the following performance deficits affecting function: weakness, impaired endurance, impaired sensation, impaired self care skills, impaired functional mobilty, decreased coordination, impaired skin, edema, impaired coordination, gait instability, impaired balance, decreased upper extremity function, decreased lower extremity function.  Pt was agreeable to OT evaluation.  Goals established to assist pt with returning to OF regarding ADLs and func mobility. Pt required assistance and DME prior to current hospitalization with a noted decline in both areas today due to increased pain and deconditioning.  Pt will benefit from skilled OT services in order to increase her level of safety and independence with ADLs and mobility.  At this time, OT recommends HHOT for pt's post acute therapy needs.  No DME rec are made.      Rehab Prognosis:  fair; patient would benefit from acute skilled OT services to address these deficits and reach maximum level of function.         Clinical Decision Makin.  OT Low:  "Pt evaluation falls under low complexity for evaluation coding due to performance deficits noted in 1-3 areas as stated above and 0 co-morbities affecting current functional status. Data obtained " "from problem focused assessments. No modifications or assistance was required for completion of evaluation. Only brief occupational profile and history review completed."     Plan:     Patient to be seen 5 x/week to address the above listed problems via self-care/home management, therapeutic activities, therapeutic exercises  · Plan of Care Expires: 07/08/18  · Plan of Care Reviewed with: patient    This Plan of care has been discussed with the patient who was involved in its development and understands and is in agreement with the identified goals and treatment plan    GOALS:    Occupational Therapy Goals        Problem: Occupational Therapy Goal    Goal Priority Disciplines Outcome Interventions   Occupational Therapy Goal     OT, PT/OT Ongoing (interventions implemented as appropriate)    Description:  Goals to be met by: 06/24/18     Patient will increase functional independence with ADLs by performing:    UE Dressing with Set-up Assistance.  LE Dressing with Moderate Assistance.  Grooming while seated with Modified [place].  Upper extremity exercise program 2 sets x15 reps per handout, with supervision.                      Time Tracking:     OT Date of Treatment: 06/10/18  OT Start Time: 1034  OT Stop Time: 1048  OT Total Time (min): 14 min    Billable Minutes:Evaluation 14 min - co-eval with PT    Lisa Cartwright, OT  6/10/2018    "

## 2018-06-10 NOTE — SUBJECTIVE & OBJECTIVE
Interval History: Having flatus but no BM yet. Still with increased abdominal pain.     Review of Systems   Constitutional: Negative for chills and fever.   Respiratory: Negative for cough and shortness of breath.    Cardiovascular: Negative for chest pain and palpitations.   Gastrointestinal: Negative for nausea and vomiting.     Objective:     Vital Signs (Most Recent):  Temp: 96.2 °F (35.7 °C) (06/10/18 0726)  Pulse: (!) 55 (06/10/18 0800)  Resp: 20 (06/10/18 0726)  BP: (!) 114/56 (06/10/18 0726)  SpO2: 95 % (06/10/18 1020) Vital Signs (24h Range):  Temp:  [96.2 °F (35.7 °C)-98.2 °F (36.8 °C)] 96.2 °F (35.7 °C)  Pulse:  [52-72] 55  Resp:  [16-20] 20  SpO2:  [95 %-99 %] 95 %  BP: ()/(47-56) 114/56     Weight: 69.4 kg (153 lb)  Body mass index is 23.96 kg/m².    Intake/Output Summary (Last 24 hours) at 06/10/18 1151  Last data filed at 06/10/18 0700   Gross per 24 hour   Intake              555 ml   Output             2600 ml   Net            -2045 ml      Physical Exam   Constitutional: She is oriented to person, place, and time. She appears well-developed. No distress.   Cardiovascular: Normal rate and regular rhythm.    No murmur heard.  Pulmonary/Chest: Effort normal and breath sounds normal. No respiratory distress. She has no wheezes.   Abdominal: Soft. Bowel sounds are normal. She exhibits no distension. There is tenderness.   SPT in place   Musculoskeletal: She exhibits no edema.   Neurological: She is alert and oriented to person, place, and time.   Skin: Skin is warm and dry.   Psychiatric: She has a normal mood and affect. Her behavior is normal.   Nursing note and vitals reviewed.      Significant Labs:   CBC:   Recent Labs  Lab 06/09/18  0400 06/10/18  0317   WBC 5.12 6.14   HGB 12.2 11.5*   HCT 38.6 36.2*    188     CMP:   Recent Labs  Lab 06/09/18  0400 06/10/18  0317    139   K 4.4 3.6    101   CO2 27 32*   GLU 93 94   BUN 10 9   CREATININE 0.8 0.8   CALCIUM 8.8 8.9    ANIONGAP 8 6*   EGFRNONAA >60 >60     Magnesium:   Recent Labs  Lab 06/09/18  0400 06/10/18  0317   MG 2.0 1.8       Significant Imaging: I have reviewed and interpreted all pertinent imaging results/findings within the past 24 hours. KUB: Significant stool throughout colon, non-obstructive bowel gas pattern.

## 2018-06-10 NOTE — ASSESSMENT & PLAN NOTE
Improving. Wearing 2 walking boots. Continue home furosemide  40 mg BID and Potassium 20 mequ daily

## 2018-06-10 NOTE — ASSESSMENT & PLAN NOTE
Neurogenic bladder  Suprapubic catheter  Appreciate assistance from Dr. Villalba for SPT exchange. UA and culture post exchange were still positive. UCx with presumptive Proteus. She has recurrent PROTEUS MIRABILIS urinary tract infections (resistant to cipro).  Continue Ceftriaxone. Had CT abd/pelvis last month that did not show any nephrolitasis or other structural issues.

## 2018-06-10 NOTE — PLAN OF CARE
Plan of care reviewed with patient, understanding verbalized.  Pt remains SB/SR on tele, 50s and 60s. PRN medication given for pain.  Bed alarm on, call light in reach, fall precautions in place. Will continue to monitor.

## 2018-06-10 NOTE — ASSESSMENT & PLAN NOTE
Creatinine is down to 0.8 today. Due to volume depletion from vomiting. Strict Intake and Output, Avoid nephrotoxins, and renally dose medications.

## 2018-06-10 NOTE — ASSESSMENT & PLAN NOTE
Cervical myelopathy  Degenerative disc disease, lumbar  S/P insertion of spinal cord stimulator  S/P insertion of intrathecal pump (Continious Dilaudid Infusion)  Unsure of Dilaudid infusion dose.  Continue home medications: PRN tizanidine 4 mg TID prn, Hydrocodone-APAP  mg every 4 hours PRN. Give pericolace today to help with constipation.

## 2018-06-11 LAB
ANION GAP SERPL CALC-SCNC: 8 MMOL/L
BACTERIA UR CULT: NORMAL
BASOPHILS # BLD AUTO: 0.01 K/UL
BASOPHILS NFR BLD: 0.2 %
BUN SERPL-MCNC: 6 MG/DL
CALCIUM SERPL-MCNC: 9.5 MG/DL
CHLORIDE SERPL-SCNC: 101 MMOL/L
CO2 SERPL-SCNC: 31 MMOL/L
CREAT SERPL-MCNC: 0.8 MG/DL
DIFFERENTIAL METHOD: ABNORMAL
EOSINOPHIL # BLD AUTO: 0.3 K/UL
EOSINOPHIL NFR BLD: 4.5 %
ERYTHROCYTE [DISTWIDTH] IN BLOOD BY AUTOMATED COUNT: 13 %
EST. GFR  (AFRICAN AMERICAN): >60 ML/MIN/1.73 M^2
EST. GFR  (NON AFRICAN AMERICAN): >60 ML/MIN/1.73 M^2
GLUCOSE SERPL-MCNC: 93 MG/DL
HCT VFR BLD AUTO: 37.5 %
HGB BLD-MCNC: 11.9 G/DL
LYMPHOCYTES # BLD AUTO: 2.1 K/UL
LYMPHOCYTES NFR BLD: 35.9 %
MAGNESIUM SERPL-MCNC: 2 MG/DL
MCH RBC QN AUTO: 27 PG
MCHC RBC AUTO-ENTMCNC: 31.7 G/DL
MCV RBC AUTO: 85 FL
MONOCYTES # BLD AUTO: 0.6 K/UL
MONOCYTES NFR BLD: 11 %
NEUTROPHILS # BLD AUTO: 2.8 K/UL
NEUTROPHILS NFR BLD: 48.4 %
PLATELET # BLD AUTO: 208 K/UL
PMV BLD AUTO: 10.2 FL
POTASSIUM SERPL-SCNC: 3.6 MMOL/L
RBC # BLD AUTO: 4.41 M/UL
SODIUM SERPL-SCNC: 140 MMOL/L
WBC # BLD AUTO: 5.74 K/UL

## 2018-06-11 PROCEDURE — 97530 THERAPEUTIC ACTIVITIES: CPT

## 2018-06-11 PROCEDURE — 63600175 PHARM REV CODE 636 W HCPCS: Performed by: NURSE PRACTITIONER

## 2018-06-11 PROCEDURE — 63600175 PHARM REV CODE 636 W HCPCS: Performed by: HOSPITALIST

## 2018-06-11 PROCEDURE — 97110 THERAPEUTIC EXERCISES: CPT

## 2018-06-11 PROCEDURE — 25000003 PHARM REV CODE 250: Performed by: NURSE PRACTITIONER

## 2018-06-11 PROCEDURE — S0077 INJECTION, CLINDAMYCIN PHOSP: HCPCS | Performed by: HOSPITALIST

## 2018-06-11 PROCEDURE — 94761 N-INVAS EAR/PLS OXIMETRY MLT: CPT

## 2018-06-11 PROCEDURE — 25000003 PHARM REV CODE 250: Performed by: HOSPITALIST

## 2018-06-11 PROCEDURE — 83735 ASSAY OF MAGNESIUM: CPT

## 2018-06-11 PROCEDURE — 11000001 HC ACUTE MED/SURG PRIVATE ROOM

## 2018-06-11 PROCEDURE — 97116 GAIT TRAINING THERAPY: CPT

## 2018-06-11 PROCEDURE — 36415 COLL VENOUS BLD VENIPUNCTURE: CPT

## 2018-06-11 PROCEDURE — 97535 SELF CARE MNGMENT TRAINING: CPT

## 2018-06-11 PROCEDURE — 80048 BASIC METABOLIC PNL TOTAL CA: CPT

## 2018-06-11 PROCEDURE — 85025 COMPLETE CBC W/AUTO DIFF WBC: CPT

## 2018-06-11 RX ORDER — FUROSEMIDE 40 MG/1
40 TABLET ORAL DAILY
Status: DISCONTINUED | OUTPATIENT
Start: 2018-06-12 | End: 2018-06-12 | Stop reason: HOSPADM

## 2018-06-11 RX ORDER — CEFAZOLIN SODIUM 1 G/50ML
1 SOLUTION INTRAVENOUS
Status: DISCONTINUED | OUTPATIENT
Start: 2018-06-11 | End: 2018-06-12 | Stop reason: HOSPADM

## 2018-06-11 RX ADMIN — LACTOBACILLUS TAB 1 TABLET: TAB at 08:06

## 2018-06-11 RX ADMIN — CEFAZOLIN SODIUM 1 G: 1 SOLUTION INTRAVENOUS at 11:06

## 2018-06-11 RX ADMIN — STANDARDIZED SENNA CONCENTRATE AND DOCUSATE SODIUM 2 TABLET: 8.6; 5 TABLET, FILM COATED ORAL at 09:06

## 2018-06-11 RX ADMIN — ENOXAPARIN SODIUM 40 MG: 100 INJECTION SUBCUTANEOUS at 08:06

## 2018-06-11 RX ADMIN — CLINDAMYCIN IN 5 PERCENT DEXTROSE 600 MG: 12 INJECTION, SOLUTION INTRAVENOUS at 08:06

## 2018-06-11 RX ADMIN — TRAZODONE HYDROCHLORIDE 200 MG: 100 TABLET ORAL at 09:06

## 2018-06-11 RX ADMIN — BUPROPION HYDROCHLORIDE 300 MG: 150 TABLET, FILM COATED, EXTENDED RELEASE ORAL at 12:06

## 2018-06-11 RX ADMIN — HYDROCODONE BITARTRATE AND ACETAMINOPHEN 1 TABLET: 10; 325 TABLET ORAL at 03:06

## 2018-06-11 RX ADMIN — PANTOPRAZOLE SODIUM 40 MG: 40 TABLET, DELAYED RELEASE ORAL at 08:06

## 2018-06-11 RX ADMIN — CLINDAMYCIN IN 5 PERCENT DEXTROSE 600 MG: 12 INJECTION, SOLUTION INTRAVENOUS at 12:06

## 2018-06-11 RX ADMIN — FAMOTIDINE 20 MG: 20 TABLET ORAL at 08:06

## 2018-06-11 RX ADMIN — ATORVASTATIN CALCIUM 80 MG: 40 TABLET, FILM COATED ORAL at 09:06

## 2018-06-11 RX ADMIN — OXYBUTYNIN CHLORIDE 5 MG: 5 TABLET, EXTENDED RELEASE ORAL at 08:06

## 2018-06-11 RX ADMIN — MORPHINE SULFATE 2 MG: 4 INJECTION INTRAVENOUS at 11:06

## 2018-06-11 RX ADMIN — FLUOXETINE 60 MG: 20 CAPSULE ORAL at 08:06

## 2018-06-11 RX ADMIN — ASPIRIN 81 MG: 81 TABLET, COATED ORAL at 08:06

## 2018-06-11 RX ADMIN — CEFAZOLIN SODIUM 1 G: 1 SOLUTION INTRAVENOUS at 03:06

## 2018-06-11 RX ADMIN — LACTOBACILLUS TAB 1 TABLET: TAB at 06:06

## 2018-06-11 RX ADMIN — FUROSEMIDE 40 MG: 40 TABLET ORAL at 08:06

## 2018-06-11 RX ADMIN — STANDARDIZED SENNA CONCENTRATE AND DOCUSATE SODIUM 2 TABLET: 8.6; 5 TABLET, FILM COATED ORAL at 08:06

## 2018-06-11 RX ADMIN — QUETIAPINE FUMARATE 100 MG: 100 TABLET ORAL at 09:06

## 2018-06-11 RX ADMIN — LACTOBACILLUS TAB 1 TABLET: TAB at 11:06

## 2018-06-11 RX ADMIN — LEVOTHYROXINE SODIUM 125 MCG: 25 TABLET ORAL at 05:06

## 2018-06-11 RX ADMIN — ENOXAPARIN SODIUM 40 MG: 100 INJECTION SUBCUTANEOUS at 09:06

## 2018-06-11 RX ADMIN — HYDROCODONE BITARTRATE AND ACETAMINOPHEN 1 TABLET: 10; 325 TABLET ORAL at 08:06

## 2018-06-11 RX ADMIN — CLOPIDOGREL BISULFATE 75 MG: 75 TABLET ORAL at 08:06

## 2018-06-11 RX ADMIN — POTASSIUM CHLORIDE 40 MEQ: 1500 TABLET, EXTENDED RELEASE ORAL at 08:06

## 2018-06-11 NOTE — PLAN OF CARE
Problem: Occupational Therapy Goal  Goal: Occupational Therapy Goal  Goals to be met by: 06/24/18     Patient will increase functional independence with ADLs by performing:    UE Dressing with Set-up Assistance.  LE Dressing with Moderate Assistance.  Grooming while seated with Modified [place]. --MET 6/11  Upper extremity exercise program 2 sets x15 reps per handout, with supervision.     Outcome: Ongoing (interventions implemented as appropriate)  Patient tolerated therapy well and is making good progress. Will benefit from continued skilled OT to address functional deficits.

## 2018-06-11 NOTE — SUBJECTIVE & OBJECTIVE
Interval History: Had multiple BMs yesterday and overnight after taking the lactulose. Still with some lower abdominal pain, but it is a little improved. Still with nausea when she eats, but this is chronic.     Review of Systems   Constitutional: Negative for chills and fever.   Respiratory: Negative for cough and shortness of breath.    Cardiovascular: Negative for chest pain and palpitations.   Gastrointestinal: Positive for nausea. Negative for vomiting.     Objective:     Vital Signs (Most Recent):  Temp: 98.1 °F (36.7 °C) (06/11/18 0751)  Pulse: 71 (06/11/18 1200)  Resp: 16 (06/11/18 0751)  BP: (!) 112/56 (06/11/18 0751)  SpO2: 98 % (06/11/18 0737) Vital Signs (24h Range):  Temp:  [96.3 °F (35.7 °C)-98.3 °F (36.8 °C)] 98.1 °F (36.7 °C)  Pulse:  [53-74] 71  Resp:  [16-20] 16  SpO2:  [97 %-99 %] 98 %  BP: ()/(43-56) 112/56     Weight: 69.4 kg (153 lb)  Body mass index is 23.96 kg/m².    Intake/Output Summary (Last 24 hours) at 06/11/18 1544  Last data filed at 06/11/18 1000   Gross per 24 hour   Intake              665 ml   Output             1200 ml   Net             -535 ml      Physical Exam   Constitutional: She is oriented to person, place, and time. She appears well-developed. No distress.   Neurological: She is alert and oriented to person, place, and time.   Skin: Skin is warm and dry.   Psychiatric: She has a normal mood and affect. Her behavior is normal.   Nursing note and vitals reviewed.      Significant Labs:   CBC:   Recent Labs  Lab 06/10/18  0317 06/11/18  0321   WBC 6.14 5.74   HGB 11.5* 11.9*   HCT 36.2* 37.5    208     CMP:   Recent Labs  Lab 06/10/18  0317 06/11/18  0321    140   K 3.6 3.6    101   CO2 32* 31*   GLU 94 93   BUN 9 6*   CREATININE 0.8 0.8   CALCIUM 8.9 9.5   ANIONGAP 6* 8   EGFRNONAA >60 >60     Magnesium:   Recent Labs  Lab 06/10/18  0317 06/11/18  0321   MG 1.8 2.0       Significant Imaging: I have reviewed all pertinent imaging results/findings  within the past 24 hours.

## 2018-06-11 NOTE — PROGRESS NOTES
.Pharmacy New Medication Education    Patient accepted medication education.    Pharmacy educated patient on name and purpose of medications and possible side effects, using the teach-back method.     Current Inpatient Medication Orders   acetaminophen tablet 650 mg   aspirin EC tablet 81 mg   atorvastatin tablet 80 mg   buPROPion TB24 tablet 300 mg   butalbital-aspirin-caffeine -40 mg 1 capsule   cefTRIAXone (ROCEPHIN) 1 g in dextrose 5 % 50 mL IVPB   clindamycin 600 MG/50 ML D5W 600 mg/50 mL IVPB 600 mg   clopidogrel tablet 75 mg   enoxaparin injection 40 mg   famotidine tablet 20 mg   FLUoxetine capsule 60 mg   furosemide tablet 40 mg   HYDROcodone-acetaminophen  mg per tablet 1 tablet   hydrOXYzine HCl tablet 25 mg   Lactobacillus acidoph-L.bulgar 1 million cell tablet 1 tablet   levothyroxine tablet 125 mcg   morphine injection 2 mg   ondansetron injection 4 mg   oxybutynin 24 hr tablet 5 mg   pantoprazole EC tablet 40 mg   potassium chloride SA CR tablet 40 mEq   QUEtiapine tablet 100 mg   ramelteon tablet 8 mg   senna tablet 17.2 mg   senna-docusate 8.6-50 mg per tablet 2 tablet   sodium chloride 0.9% flush 5 mL   tiZANidine tablet 4 mg   traZODone tablet 200 mg       Learners of pharmacy medication education included:  Patient    Patient +/- learner response:  Verbalized Understanding, Teachback

## 2018-06-11 NOTE — PLAN OF CARE
Problem: Patient Care Overview  Goal: Plan of Care Review  Outcome: Ongoing (interventions implemented as appropriate)  Pt on RA with documented sats.  Will continue to monitor.

## 2018-06-11 NOTE — PLAN OF CARE
TN went to meet with patient, daughter at bedside.  Patient sitting in chair at this time. On discharge, will resume home health ([facility]). Therapy is also recommending home health PT/OT. Will continue to follow. PCP follow-up also scheduled.    Future Appointments  Date Time Provider Department Center   6/21/2018 9:20 AM Mesfin Hodges II, MD [facility] IM [facility] PCW   6/29/2018 1:30 PM [facility] [place]   6/29/2018 2:00 PM [facility] [place]   7/18/2018 9:30 AM Erik Oconnor MD [facility] PSYCH [facility]   7/18/2018 10:00 AM Analilia Mercado, PhD [facility] PSYCHOL [facility]        06/11/18 1320   Discharge Reassessment   Assessment Type Discharge Planning Reassessment   Provided patient/caregiver education on the expected discharge date and the discharge plan Yes   Do you have any problems affording any of your prescribed medications? TBD   Discharge Plan A Home with family;Home Health   Discharge Plan B Home with family   Patient choice form signed by patient/caregiver N/A   Can the patient answer the patient profile reliably? Yes, cognitively intact   Describe the patient's ability to walk at the present time. Walks with the help of equipment     Effie Mcgill RN  Transition Navigator  [phone]

## 2018-06-11 NOTE — PLAN OF CARE
Problem: Physical Therapy Goal  Goal: Physical Therapy Goal  Goals to be met by: 2019     Patient will increase functional independence with mobility by performin. Supine to sit with Modified [place]  2. Sit to stand transfer with Minimal Assistance  3. Gait  x 25 feet with Minimal Assistance using Rolling Walker.   4. Lower extremity exercise program x10 reps per handout, with supervision     Outcome: Ongoing (interventions implemented as appropriate)  Continue working toward goals.

## 2018-06-11 NOTE — PT/OT/SLP PROGRESS
Occupational Therapy   Treatment    Name: Tasha Hawley  MRN: [id number]  Admitting Diagnosis:  Recurrent UTI (urinary tract infection)       Recommendations:     Discharge Recommendations: home health PT, home health OT  Discharge Equipment Recommendations:  none  Barriers to discharge:  None    Subjective     Communicated with: Gabrielle fernandez prior to session.  Pain/Comfort:  · Pain Rating 1: 6/10  · Location - Orientation 1: generalized  · Location 1: abdomen (suprapubic area)  · Pain Addressed 1: Cessation of Activity, Nurse notified  · Pain Rating Post-Intervention 1: 7/10    Patients cultural, spiritual, Anabaptist conflicts given the current situation:  (none reported)    Objective:     Patient found with: SCD, telemetry (suprapubic catheter)    General Precautions: Standard, fall   Orthopedic Precautions:N/A   Braces: N/A     Bed Mobility:    · N/A - in bedside chair    Functional Mobility/Transfers:  · Patient completed Sit <> Stand Transfer with minimum assistance  with  rolling walker     Activities of Daily Living:  · Grooming: modified independence      Patient left up in chair with all lines intact, call button in reach, RN notified and daughter present    [facility] 6 Click:  [facility] Total Score: 17    Treatment & Education:  Patient UIC completing coloring activity. Completed oral care, facial hygiene, hair grooming, and donned deodorant with mod indep. Instructed on and completed BUE AROM therex, 1 x 15 reps: bicep curls, horizontal sh abd/add, punches, completed 10 reps of sh flexion 2* increased difficulty. Patient instructed in press-ups from chair and completed x 3 with min (A), then completed full stands x 2 using RW.   Education:    Assessment:   Patient tolerated therapy well and is making good progress. Will benefit from continued skilled OT to address functional deficits.     Tasha Hawley is a 62 y.o. female with a medical diagnosis of Recurrent UTI (urinary tract infection).  Performance  deficits affecting function are weakness, impaired endurance, impaired self care skills, impaired functional mobilty, gait instability, impaired balance, pain, decreased ROM, decreased lower extremity function, decreased upper extremity function.      Rehab Prognosis:  Good; patient would benefit from acute skilled OT services to address these deficits and reach maximum level of function.       Plan:     Patient to be seen 5 x/week to address the above listed problems via self-care/home management, therapeutic activities, therapeutic exercises  · Plan of Care Expires: 07/08/18  · Plan of Care Reviewed with: patient, daughter    This Plan of care has been discussed with the patient who was involved in its development and understands and is in agreement with the identified goals and treatment plan    GOALS:    Occupational Therapy Goals        Problem: Occupational Therapy Goal    Goal Priority Disciplines Outcome Interventions   Occupational Therapy Goal     OT, PT/OT Ongoing (interventions implemented as appropriate)    Description:  Goals to be met by: 06/24/18     Patient will increase functional independence with ADLs by performing:    UE Dressing with Set-up Assistance.  LE Dressing with Moderate Assistance.  Grooming while seated with Modified [place]. --MET 6/11  Upper extremity exercise program 2 sets x15 reps per handout, with supervision.                       Time Tracking:     OT Date of Treatment: 06/11/18  OT Start Time: 1502  OT Stop Time: 1540  OT Total Time (min): 38 min    Billable Minutes:Self Care/Home Management 18  Therapeutic Activity 10  Therapeutic Exercise 10    SOCO Silvestre  6/11/2018

## 2018-06-11 NOTE — PT/OT/SLP PROGRESS
Physical Therapy Treatment    Patient Name:  Tasha Hawley   MRN:  [id number]    Recommendations:     Discharge Recommendations:  home health PT   Discharge Equipment Recommendations: none   Barriers to discharge: None    Assessment:     Tasha Hawley is a 62 y.o. female admitted with a medical diagnosis of Recurrent UTI (urinary tract infection).  She presents with the following impairments/functional limitations:  weakness, impaired endurance, impaired self care skills, impaired functional mobilty, gait instability, impaired balance, decreased coordination, decreased upper extremity function, decreased lower extremity function, pain.  Pt demonstrated an increase in ambulation distance today, but required rest breaks between bouts secondary to decreased strength and endurance.  Pt would continue to benefit from P.T. To address impairments listed above.    Rehab Prognosis:  Fair+; patient would benefit from acute skilled PT services to address these deficits and reach maximum level of function.      Recent Surgery: * No surgery found *      Plan:     During this hospitalization, patient to be seen 5 x/week to address the above listed problems via gait training, therapeutic activities, therapeutic exercises  · Plan of Care Expires:  06/24/18   Plan of Care Reviewed with: patient, daughter    Subjective     Communicated with RN(Gabrielle) prior to session.  Patient found supine upon PT entry to room, agreeable to treatment.        Patient comments/goals: Pt agreed to tx.  Pain/Comfort:  · Pain Rating 1: 7/10 (on and off pain left lower abdominal/groin region.)  · Location 1:  (as above)  · Pain Addressed 1: Reposition, Distraction, Nurse notified, Pre-medicate for activity  · Pain Rating Post-Intervention 1: 9/10 (on and off during tx.)    Patients cultural, spiritual, Church conflicts given the current situation: none    Objective:     Patient found with: SCD, telemetry, meadows catheter (suprapubic catheter)      General Precautions: Standard, fall   Orthopedic Precautions:N/A   Braces:       Functional Mobility:  · Bed Mobility:     · Rolling Right: supervision and HOB elevated  · Scooting: stand by assistance to EOB  · Supine to Sit: stand by assistance with HOB elevated  · Transfers:     · Sit to Stand:  contact guard assistance, minimum assistance and x 3 reps with rolling walker and vc's for hand placement  · Bed to Chair: contact guard assistance and minimum assistance with  rolling walker  using  Step Transfer  · Gait: 40ft and 20ft with Rw and close CGA.  VC's to stay closer to RW for increased stability and safety and for upright posture, which pt had difficulty maintaining. Ambulates with decreased cj.  · Balance: sitting good, standing with RWfair/fair-, gait with Rw fair/fair-      AM-PAC 6 CLICK MOBILITY  Turning over in bed (including adjusting bedclothes, sheets and blankets)?: 3  Sitting down on and standing up from a chair with arms (e.g., wheelchair, bedside commode, etc.): 3  Moving from lying on back to sitting on the side of the bed?: 3  Moving to and from a bed to a chair (including a wheelchair)?: 3  Need to walk in hospital room?: 3  Climbing 3-5 steps with a railing?: 1  Total Score: 16       Therapeutic Activities and Exercises:   Seated BLE therex: AP, LAQ, hip flexion/ABD/ADd, and glut sets x 15 reps.    Patient left up in chair with all lines intact, call button in reach, RN notified and pt's daughter present..    GOALS:    Physical Therapy Goals        Problem: Physical Therapy Goal    Goal Priority Disciplines Outcome Goal Variances Interventions   Physical Therapy Goal     PT/OT, PT Ongoing (interventions implemented as appropriate)     Description:  Goals to be met by: 2019     Patient will increase functional independence with mobility by performin. Supine to sit with Modified Brooke  2. Sit to stand transfer with Minimal Assistance  3. Gait  x 25 feet with Minimal  Assistance using Rolling Walker.   4. Lower extremity exercise program x10 reps per handout, with supervision                      Time Tracking:     PT Received On: 06/11/18  PT Start Time: 1130     PT Stop Time: 1156  PT Total Time (min): 26 min     Billable Minutes: Gait Training 15 and Therapeutic Exercise 11    Treatment Type: Treatment  PT/PTA: PTA     PTA Visit Number: 1     Reema Daley, ROCKY  06/11/2018

## 2018-06-12 VITALS
HEART RATE: 57 BPM | RESPIRATION RATE: 16 BRPM | BODY MASS INDEX: 24.01 KG/M2 | TEMPERATURE: 99 F | HEIGHT: 67 IN | DIASTOLIC BLOOD PRESSURE: 55 MMHG | OXYGEN SATURATION: 96 % | WEIGHT: 153 LBS | SYSTOLIC BLOOD PRESSURE: 109 MMHG

## 2018-06-12 PROBLEM — I25.5 ISCHEMIC CARDIOMYOPATHY: Chronic | Status: ACTIVE | Noted: 2018-03-08

## 2018-06-12 PROBLEM — Z93.59 SUPRAPUBIC CATHETER: Chronic | Status: ACTIVE | Noted: 2018-02-01

## 2018-06-12 PROBLEM — I50.42 CHRONIC COMBINED SYSTOLIC AND DIASTOLIC CONGESTIVE HEART FAILURE: Chronic | Status: ACTIVE | Noted: 2018-03-08

## 2018-06-12 PROBLEM — R11.2 INTRACTABLE NAUSEA AND VOMITING: Status: RESOLVED | Noted: 2018-06-08 | Resolved: 2018-06-12

## 2018-06-12 LAB
ANION GAP SERPL CALC-SCNC: 10 MMOL/L
BACTERIA UR CULT: NORMAL
BACTERIA UR CULT: NORMAL
BASOPHILS # BLD AUTO: 0.01 K/UL
BASOPHILS NFR BLD: 0.2 %
BUN SERPL-MCNC: 7 MG/DL
CALCIUM SERPL-MCNC: 9.4 MG/DL
CHLORIDE SERPL-SCNC: 101 MMOL/L
CO2 SERPL-SCNC: 27 MMOL/L
CREAT SERPL-MCNC: 0.8 MG/DL
DIFFERENTIAL METHOD: ABNORMAL
EOSINOPHIL # BLD AUTO: 0.4 K/UL
EOSINOPHIL NFR BLD: 8 %
ERYTHROCYTE [DISTWIDTH] IN BLOOD BY AUTOMATED COUNT: 13 %
EST. GFR  (AFRICAN AMERICAN): >60 ML/MIN/1.73 M^2
EST. GFR  (NON AFRICAN AMERICAN): >60 ML/MIN/1.73 M^2
GLUCOSE SERPL-MCNC: 92 MG/DL
HCT VFR BLD AUTO: 35.3 %
HGB BLD-MCNC: 11.1 G/DL
LYMPHOCYTES # BLD AUTO: 1.8 K/UL
LYMPHOCYTES NFR BLD: 40.5 %
MAGNESIUM SERPL-MCNC: 2 MG/DL
MCH RBC QN AUTO: 26.7 PG
MCHC RBC AUTO-ENTMCNC: 31.4 G/DL
MCV RBC AUTO: 85 FL
MONOCYTES # BLD AUTO: 0.5 K/UL
MONOCYTES NFR BLD: 11.1 %
NEUTROPHILS # BLD AUTO: 1.8 K/UL
NEUTROPHILS NFR BLD: 40.2 %
PLATELET # BLD AUTO: 200 K/UL
PMV BLD AUTO: 10 FL
POTASSIUM SERPL-SCNC: 4 MMOL/L
RBC # BLD AUTO: 4.16 M/UL
SODIUM SERPL-SCNC: 138 MMOL/L
WBC # BLD AUTO: 4.52 K/UL

## 2018-06-12 PROCEDURE — 25000003 PHARM REV CODE 250: Performed by: NURSE PRACTITIONER

## 2018-06-12 PROCEDURE — 83735 ASSAY OF MAGNESIUM: CPT

## 2018-06-12 PROCEDURE — 63600175 PHARM REV CODE 636 W HCPCS: Performed by: NURSE PRACTITIONER

## 2018-06-12 PROCEDURE — 36415 COLL VENOUS BLD VENIPUNCTURE: CPT

## 2018-06-12 PROCEDURE — 85025 COMPLETE CBC W/AUTO DIFF WBC: CPT

## 2018-06-12 PROCEDURE — 80048 BASIC METABOLIC PNL TOTAL CA: CPT

## 2018-06-12 PROCEDURE — 25000003 PHARM REV CODE 250: Performed by: HOSPITALIST

## 2018-06-12 PROCEDURE — 63600175 PHARM REV CODE 636 W HCPCS: Performed by: HOSPITALIST

## 2018-06-12 PROCEDURE — 94761 N-INVAS EAR/PLS OXIMETRY MLT: CPT

## 2018-06-12 RX ORDER — CEPHALEXIN 500 MG/1
500 CAPSULE ORAL EVERY 6 HOURS
Qty: 32 CAPSULE | Refills: 0 | Status: SHIPPED | OUTPATIENT
Start: 2018-06-12 | End: 2018-06-21

## 2018-06-12 RX ADMIN — FLUOXETINE 60 MG: 20 CAPSULE ORAL at 08:06

## 2018-06-12 RX ADMIN — POTASSIUM CHLORIDE 40 MEQ: 1500 TABLET, EXTENDED RELEASE ORAL at 08:06

## 2018-06-12 RX ADMIN — FAMOTIDINE 20 MG: 20 TABLET ORAL at 08:06

## 2018-06-12 RX ADMIN — LACTOBACILLUS TAB 1 TABLET: TAB at 08:06

## 2018-06-12 RX ADMIN — BUPROPION HYDROCHLORIDE 300 MG: 150 TABLET, FILM COATED, EXTENDED RELEASE ORAL at 08:06

## 2018-06-12 RX ADMIN — OXYBUTYNIN CHLORIDE 5 MG: 5 TABLET, EXTENDED RELEASE ORAL at 08:06

## 2018-06-12 RX ADMIN — FUROSEMIDE 40 MG: 40 TABLET ORAL at 08:06

## 2018-06-12 RX ADMIN — ENOXAPARIN SODIUM 40 MG: 100 INJECTION SUBCUTANEOUS at 08:06

## 2018-06-12 RX ADMIN — LEVOTHYROXINE SODIUM 125 MCG: 25 TABLET ORAL at 06:06

## 2018-06-12 RX ADMIN — CEFAZOLIN SODIUM 1 G: 1 SOLUTION INTRAVENOUS at 08:06

## 2018-06-12 RX ADMIN — CLOPIDOGREL BISULFATE 75 MG: 75 TABLET ORAL at 08:06

## 2018-06-12 RX ADMIN — PANTOPRAZOLE SODIUM 40 MG: 40 TABLET, DELAYED RELEASE ORAL at 08:06

## 2018-06-12 RX ADMIN — ASPIRIN 81 MG: 81 TABLET, COATED ORAL at 08:06

## 2018-06-12 RX ADMIN — STANDARDIZED SENNA CONCENTRATE AND DOCUSATE SODIUM 2 TABLET: 8.6; 5 TABLET, FILM COATED ORAL at 08:06

## 2018-06-12 RX ADMIN — MORPHINE SULFATE 2 MG: 4 INJECTION INTRAVENOUS at 12:06

## 2018-06-12 NOTE — PLAN OF CARE
Problem: Patient Care Overview  Goal: Plan of Care Review  Outcome: Ongoing (interventions implemented as appropriate)  POC reviewed with patient. Suprapubic catheter in place draining clear yellow urine. PRN pain medication administered for lower abdominal pain. IV antibiotics administered for UTI. Tele monitor in place reading NSR/sinus riley, HR 50s-70s. No red alarms or ectopy noted. Bed alarm on, bed locked and low, side rails up x2, and call bell in reach. Patient verbalizes clear understanding of POC.

## 2018-06-12 NOTE — PLAN OF CARE
Problem: Patient Care Overview  Goal: Plan of Care Review  Outcome: Ongoing (interventions implemented as appropriate)  Plan of care reviewed with patient and her .  Suprapubic catheter in place, clear yellow urine draining into the bag.  Aseptic technique utilized with pt's care.  Pt is on continuous tele monitor on with NSR to SB in 50s-60s.  Complaints of pain on lower back, administered pain medicine.  Antibiotic has given during shift.  No red alarm noted.  Fall precautions reviewed and maintained.  Bed in low and locked, bed alarm on, and instructed to call for help.  Will continue to monitor.

## 2018-06-12 NOTE — ASSESSMENT & PLAN NOTE
Cervical myelopathy  Degenerative disc disease, lumbar  S/P insertion of spinal cord stimulator  S/P insertion of intrathecal pump (Continious Dilaudid Infusion)  Unsure of Dilaudid infusion dose.  Continue home medications: PRN tizanidine 4 mg TID prn, Hydrocodone-APAP  mg every 4 hours PRN. Had multiple BMs.

## 2018-06-12 NOTE — ASSESSMENT & PLAN NOTE
Due to MSSA and Proteus  Neurogenic bladder  Suprapubic catheter  Appreciate assistance from Dr. Villalba for SPT exchange. UA and culture post exchange were still positive. UCx from admission with Proteus, but UCx after exchange with MSSA. She has recurrent PROTEUS MIRABILIS urinary tract infections (resistant to cipro). Spoke to Micro lab and they said that the MSSA is resistant to clinda. Since it is sensitive to oxacillin, it is sensitive to cephalosporins. Will transition to cefazolin today to ensure she can tolerate it with her PCN allergy. Likely discharge tomorrow on something similar to complete a 14 day course (Stop 6/20/18). CT scan did not show anything acute.

## 2018-06-12 NOTE — PROGRESS NOTES
[facility] Hospital Medicine  Progress Note    Patient Name: Tasha Hawley  MRN: [id number]  Patient Class: IP- Inpatient   Admission Date: 6/7/2018  Length of Stay: 4 days  Attending Physician: Kwame Concepcion MD  Primary Care Provider: Mesfin Hodges Ii, MD        Subjective:     Principal Problem:Recurrent UTI (urinary tract infection)    HPI:  Tasha Hawley is a 61 y.o. white woman with hypothyroidism, coronary artery disease with history of acute coronary syndrome in January 2016 and ischemic cardiomyopathy (ejection fraction since improved), thoracic aortic atherosclerosis, severe left atrial enlargement, diastolic dysfunction, gastroesophageal reflux disease status post Nissen fundoplication, right facial droop since birth, history of work-related back injury in the 1990s with scoliosis, lumbar degenerative disc disease, lumbar facet arthropathy, history of multiple fusions and spacers from L3-S1, status post spinal cord stimulator and intrathecal hydromorphone pump, chronic constipation, neurogenic bladder status post suprapubic catheter placement, recurrent PROTEUS MIRABILIS urinary tract infections (resistant to cipro), obstructive sleep apnea, lower extremity lymphedema, chronic insomnia, and depression.  She has difficulty ambulating at baseline.  She lives in [place], [place].  She is .  Her primary care physician is Dr. Mesfin Hodges.  Her pain management specialist is Dr. Nigel Hillman.  Her urologist is Dr. Shireen Mayo.  Her gastroenterologist is Dr. Prince Vance.  Her psychiatrist is Dr. Erik Oconnor.  She has many antibiotic allergies. She can tolerate cephalosporins.    She presented to [facility] ED due to intractable vomiting starting yesterday morning. She has been unable to retain any liquids or solids. She has associated abdominal pain but denies any blood in vomit, fever or chills. She had episodes of diarrhea yesterday but none today. She was  seen at an urgent care today for symptoms and was told she had an UTI and referred to the ED for further evaluation. She admits to having blood in urine of catheter bag.  She is afebrile with no leukocytosis.  She has a bump in her BUN 18 (up from 7), and Creatinine 1.5 (up from 0.7).  Her shows significant UTI with + Nitrites, RBC > 100,  with clumps and moderate bacteria. Suprapubic cath last changed on 5/25/18.  She was given 1 liter of normal saline, zofran, and 1 gram of ceftriaxone. She is admitted to [facility].        Hospital Course:  No notes on file    Interval History: Had multiple BMs yesterday and overnight after taking the lactulose. Still with some lower abdominal pain, but it is a little improved. Still with nausea when she eats, but this is chronic.     Review of Systems   Constitutional: Negative for chills and fever.   Respiratory: Negative for cough and shortness of breath.    Cardiovascular: Negative for chest pain and palpitations.   Gastrointestinal: Positive for nausea. Negative for vomiting.     Objective:     Vital Signs (Most Recent):  Temp: 98.1 °F (36.7 °C) (06/11/18 0751)  Pulse: 71 (06/11/18 1200)  Resp: 16 (06/11/18 0751)  BP: (!) 112/56 (06/11/18 0751)  SpO2: 98 % (06/11/18 0737) Vital Signs (24h Range):  Temp:  [96.3 °F (35.7 °C)-98.3 °F (36.8 °C)] 98.1 °F (36.7 °C)  Pulse:  [53-74] 71  Resp:  [16-20] 16  SpO2:  [97 %-99 %] 98 %  BP: ()/(43-56) 112/56     Weight: 69.4 kg (153 lb)  Body mass index is 23.96 kg/m².    Intake/Output Summary (Last 24 hours) at 06/11/18 1544  Last data filed at 06/11/18 1000   Gross per 24 hour   Intake              665 ml   Output             1200 ml   Net             -535 ml      Physical Exam   Constitutional: She is oriented to person, place, and time. She appears well-developed. No distress.   Neurological: She is alert and oriented to person, place, and time.   Skin: Skin is warm and dry.   Psychiatric: She has a  normal mood and affect. Her behavior is normal.   Nursing note and vitals reviewed.      Significant Labs:   CBC:   Recent Labs  Lab 06/10/18  0317 06/11/18  0321   WBC 6.14 5.74   HGB 11.5* 11.9*   HCT 36.2* 37.5    208     CMP:   Recent Labs  Lab 06/10/18  0317 06/11/18  0321    140   K 3.6 3.6    101   CO2 32* 31*   GLU 94 93   BUN 9 6*   CREATININE 0.8 0.8   CALCIUM 8.9 9.5   ANIONGAP 6* 8   EGFRNONAA >60 >60     Magnesium:   Recent Labs  Lab 06/10/18  0317 06/11/18  0321   MG 1.8 2.0       Significant Imaging: I have reviewed all pertinent imaging results/findings within the past 24 hours.    Assessment/Plan:      * Recurrent UTI (urinary tract infection)    Due to MSSA and Proteus  Neurogenic bladder  Suprapubic catheter  Appreciate assistance from Dr. Villalba for SPT exchange. UA and culture post exchange were still positive. UCx from admission with Proteus, but UCx after exchange with MSSA. She has recurrent PROTEUS MIRABILIS urinary tract infections (resistant to cipro). Spoke to Micro lab and they said that the MSSA is resistant to clinda. Since it is sensitive to oxacillin, it is sensitive to cephalosporins. Will transition to cefazolin today to ensure she can tolerate it with her PCN allergy. Likely discharge tomorrow on something similar to complete a 14 day course (Stop 6/20/18). CT scan did not show anything acute.         Intractable nausea and vomiting    PRN Nausea Medication.           Chronic combined systolic and diastolic congestive heart failure    Bilateral carotid artery disease  Coronary artery disease involving native coronary artery of native heart without angina pectoris  8/21/17 Echo, Normal EF + DD  Continue home aspirin 81 mg daily, clopidogrel 75 mg daily            Lymphedema of both lower extremities    Improving. Wearing 2 walking boots. Continue home furosemide  40 mg BID and Potassium 20 mequ daily        Primary hypothyroidism    Continue home levothyroxine 125  mcg daily. TSH is 1.225.         GERD (gastroesophageal reflux disease)    Continue home pepcid 20 mg daily          Chronic pain syndrome    Cervical myelopathy  Degenerative disc disease, lumbar  S/P insertion of spinal cord stimulator  S/P insertion of intrathecal pump (Continious Dilaudid Infusion)  Unsure of Dilaudid infusion dose.  Continue home medications: PRN tizanidine 4 mg TID prn, Hydrocodone-APAP  mg every 4 hours PRN. Had multiple BMs.         Iron deficiency anemia    Hgb is stable at 12.         Generalized anxiety disorder    Major depressive disorder, recurrent, mild  Continue home Trazadone 200 mg daily, Fluoxetine 60 mg daily, bupropion 24 hr 300 mg daily            VTE Risk Mitigation         Ordered     enoxaparin injection 40 mg  Every 12 hours      06/08/18 0254     IP VTE HIGH RISK PATIENT  Once      06/08/18 0254              Kwame Concepcion MD  Department of Hospital Medicine   [facility]

## 2018-06-12 NOTE — PLAN OF CARE
met with patient at bedside to assess for any discharge needs. Patient informed patient her  will provide transportation home at discharge.    Patient provided with  business card.

## 2018-06-12 NOTE — HOSPITAL COURSE
Urine culture grew >100,000 cfu/mL Proteus mirabilis and Staphylococcus aureus.  Urology changed her catheter.  Staphylococcus aureus grew from her repeat urine culture.  She was put on cefazolin, and discharged home on cephalexin.  Nausea and vomiting improved and she did not need any antiemetics.  She will complete 14 days of antibiotics and follow up with her primary care physician.

## 2018-06-12 NOTE — PROGRESS NOTES
.Pharmacy New Medication Education    Patient accepted medication education.    Pharmacy educated patient on name and purpose of medications and possible side effects, using the teach-back method.     Current Inpatient Medication Orders   acetaminophen tablet 650 mg   aspirin EC tablet 81 mg   atorvastatin tablet 80 mg   buPROPion TB24 tablet 300 mg   butalbital-aspirin-caffeine -40 mg 1 capsule   ceFAZolin (ANCEF) 1 gram in dextrose 5 % 50 mL IVPB (premix)   clopidogrel tablet 75 mg   enoxaparin injection 40 mg   famotidine tablet 20 mg   FLUoxetine capsule 60 mg   furosemide tablet 40 mg   HYDROcodone-acetaminophen  mg per tablet 1 tablet   hydrOXYzine HCl tablet 25 mg   Lactobacillus acidoph-L.bulgar 1 million cell tablet 1 tablet   levothyroxine tablet 125 mcg   morphine injection 2 mg   ondansetron injection 4 mg   oxybutynin 24 hr tablet 5 mg   pantoprazole EC tablet 40 mg   potassium chloride SA CR tablet 40 mEq   QUEtiapine tablet 100 mg   ramelteon tablet 8 mg   senna tablet 17.2 mg   senna-docusate 8.6-50 mg per tablet 2 tablet   sodium chloride 0.9% flush 5 mL   tiZANidine tablet 4 mg   traZODone tablet 200 mg       Learners of pharmacy medication education included:  Patient    Patient +/- learner response:  Verbalized Understanding, Teachback

## 2018-06-12 NOTE — DISCHARGE SUMMARY
[facility] Hospital Medicine  Discharge Summary      Patient Name: Tasha Hawley  MRN: [id number]  Admission Date: 6/7/2018  Hospital Length of Stay: 4 days  Discharge Date and Time: 6/12/2018  1:00 PM  Attending Physician: Isaias Anderson MD   Discharging Provider: Isaias Anderson MD  Primary Care Provider: Mesfin Hodges Ii, MD      HPI:   Tasha Hawlye is a 62 y.o. white woman with hypothyroidism, coronary artery disease with history of acute coronary syndrome in January 2016 and ischemic cardiomyopathy (ejection fraction since improved), thoracic aortic atherosclerosis, severe left atrial enlargement, diastolic dysfunction, gastroesophageal reflux disease status post Nissen fundoplication, right facial droop since birth, history of work-related back injury in the 1990s with scoliosis, lumbar degenerative disc disease, lumbar facet arthropathy, history of multiple fusions and spacers from L3-S1, status post spinal cord stimulator and intrathecal hydromorphone pump, chronic constipation, neurogenic bladder status post suprapubic catheter placement, recurrent urinary tract infections, obstructive sleep apnea, lower extremity lymphedema, chronic insomnia, and depression.  She has difficulty ambulating at baseline.  She lives in [place], [place].  She is .  Her primary care physician is Dr. Mesfin Hodges.  Her pain management specialist is Dr. Nigel Hillman.  Her urologist is Dr. Shireen Mayo.  Her gastroenterologist is Dr. Prince Vance.  Her psychiatrist is Dr. Erik Oconnor.  She can tolerate cephalosporins.   She presented to [facility] Emergency Department on 6/7/18 with intractable vomiting since the day before.  She was unable to retain any solids or liquids.  She had associated abdominal pain and episodes of diarrhea the day before.  She was seen at an urgent care clinic prior to being sent to the ED.  She reported blood in her urine.  Labs showed  increased BUN (18, from 7) and creatinine (1.5, from 0.7).  Urinalysis showed positive nitrites, >100 RBC/hpf, >100 WBC/hpf with WBC clumps, and moderate bacteria.  Her suprapubic catheter was last changed on 5/25/18.  She was given 1 liter of normal saline, ondansetron, and ceftriaxone.  She was admitted to [facility].    * No surgery found *      Hospital Course:   Urine culture grew >100,000 cfu/mL Proteus mirabilis and Staphylococcus aureus.  Urology changed her catheter.  Staphylococcus aureus grew from her repeat urine culture.  She was put on cefazolin, and discharged home on cephalexin.  Nausea and vomiting improved and she did not need any antiemetics.  She will complete 14 days of antibiotics and follow up with her primary care physician.     Consults:   Consults         Status Ordering Provider     Inpatient consult to Urology  Once     Provider:  MD Kaley Alfaro ROSANNE M.        Final Active Diagnoses:    Diagnosis Date Noted POA    PRINCIPAL PROBLEM:  Recurrent UTI (urinary tract infection) [N39.0] 06/08/2018 Yes    Ischemic cardiomyopathy [I25.5] 03/08/2018 Yes     Chronic    Suprapubic catheter [Z93.59] 02/01/2018 Not Applicable     Chronic    Bilateral carotid artery disease [I77.9] 11/14/2017 Yes    Degenerative disc disease, lumbar [M51.36] 03/31/2017 Yes     Chronic    Facet arthropathy, lumbar [M46.96] 03/31/2017 Yes    S/P insertion of spinal cord stimulator [Z98.890] 03/31/2017 Not Applicable     Chronic    S/P insertion of intrathecal pump [Z98.890] 03/31/2017 Not Applicable     Chronic    Lymphedema of both lower extremities [I89.0] 03/02/2017 Yes     Chronic    Primary hypothyroidism [E03.9] 02/02/2017 Yes     Chronic    Neurogenic bladder [N31.9] 09/27/2016 Yes     Chronic    Coronary artery disease involving native coronary artery of native heart without angina pectoris [I25.10] 08/16/2016 Yes     Chronic    GERD (gastroesophageal  reflux disease) [K21.9] 08/16/2016 Yes     Chronic    Cervical myelopathy [G95.9] 05/13/2013 Yes    Chronic pain syndrome [G89.4] 05/01/2013 Yes     Chronic    Major depressive disorder, recurrent, mild [F33.0] 02/21/2013 Yes    Generalized anxiety disorder [F41.1]  Yes    Iron deficiency anemia [D50.9]  Yes      Problems Resolved During this Admission:    Diagnosis Date Noted Date Resolved POA    Intractable nausea and vomiting [R11.2] 06/08/2018 06/12/2018 Yes    CARITO (acute kidney injury) [N17.9] 06/08/2018 06/10/2018 Yes    Pyelonephritis [N12] 06/08/2018 06/10/2018 Yes       Discharged Condition: good    [place] [facility]    Follow Up:  Follow-up Information     Mesfin Hodges Ii, MD On 6/21/2018.    Specialty:  Internal Medicine  Why:  at 09:20--, Primary Care Physician--No later appointments available.  Contact information:  [address]  [place] [address]  [phone]             [facility] [place].    Specialties:  Home Health Services, DME Provider  Why:  [employer]  Contact information:  [address] [facility] [address]  [phone]             Call Shireen Mayo MD.    Specialty:  Urology  Why:  AS NEEDED  Contact information:  [address]  [place] [address]  [phone]                 Patient Instructions:     Ambulatory referral to Outpatient Case Management   Referral Priority: Routine Referral Type: Consultation   Referral Reason: Specialty Services Required    Number of Visits Requested: 1      Diet Adult Regular     Activity as tolerated     Notify your health care provider if you experience any of the following:  persistent nausea and vomiting or diarrhea         Significant Diagnostic Studies:   CT Abdomen Pelvis With Contrast 6/10/18:  FINDINGS:  Moderate size gastroesophageal hiatal hernia.  The liver, gallbladder, spleen, pancreas, and adrenals are unremarkable.  No bowel wall thickening or dilation.  No ascites.  Incidental  circumaortic left renal vein.  No adenopathy.  The kidneys and ureters are unremarkable.  Suprapubic catheter in the urinary bladder.  No focal fluid collection or evidence of inflammatory change.  The urinary bladder is collapsed.  Hysterectomy.  Right lower quadrant subcutaneous neural stimulator.  No acute bone abnormality.  Impression: No acute abnormality.     Medications:  Reconciled Home Medications:      Medication List      START taking these medications    cephALEXin 500 MG capsule  Commonly known as:  KEFLEX  Take 1 capsule (500 mg total) by mouth every 6 (six) hours.        CHANGE how you take these medications    atorvastatin 80 MG tablet  Commonly known as:  LIPITOR  TAKE ONE TABLET BY MOUTH EVERY DAY.  What changed:  See the new instructions.     potassium chloride SA 20 MEQ tablet  Commonly known as:  K-DUR,KLOR-CON  TAKE THREE TABLETS BY MOUTH DAILY  What changed:  See the new instructions.        CONTINUE taking these medications    aspirin 81 MG EC tablet  Commonly known as:  ECOTRIN  Take 1 tablet (81 mg total) by mouth once daily.     buPROPion 300 MG 24 hr tablet  Commonly known as:  WELLBUTRIN XL  Take 300 mg by mouth once daily.     butalbital-aspirin-caffeine -40 mg -40 mg Cap  Commonly known as:  FIORINAL  Take 1 capsule by mouth 3 (three) times daily as needed.     clopidogrel 75 mg tablet  Commonly known as:  PLAVIX  Take 75 mg by mouth once daily.     DOC-Q-LACE 100 MG capsule  Generic drug:  docusate sodium  TAKE ONE CAPSULE BY MOUTH THREE TIMES DAILY AS NEEDED FOR CONSTIPATION     docosanol 10 % Crea  Commonly known as:  ABREVA  Apply 1 application topically 2 (two) times daily.     FLUoxetine 20 MG capsule  Commonly known as:  PROZAC  Take 3 capsules (60 mg total) by mouth once daily.     furosemide 40 MG tablet  Commonly known as:  LASIX  Take 1 tablet (40 mg total) by mouth 2 (two) times daily.     HYDROcodone-acetaminophen  mg per tablet  Commonly known as:   NORCO  Take 2 tablets by mouth every 4 (four) hours as needed for Pain.     Lactobacillus acidophilus 10 billion cell Cap  Commonly known as:  PROBIOTIC  Take 1 capsule by mouth once daily.     levothyroxine 125 MCG tablet  Commonly known as:  SYNTHROID  Take 1 tablet (125 mcg total) by mouth before breakfast.     ondansetron 4 MG Tbdl  Commonly known as:  ZOFRAN-ODT  Take 1 tablet (4 mg total) by mouth every 8 (eight) hours as needed.     ondansetron 8 MG tablet  Commonly known as:  ZOFRAN  TAKE ONE TABLET BY MOUTH EVERY TWELVE HOURS AS NEEDED FOR NAUSEA     oxybutynin 15 MG Tr24  Commonly known as:  DITROPAN XL  Take 5 mg by mouth once daily.     pantoprazole 40 MG tablet  Commonly known as:  PROTONIX  Take 1 tablet (40 mg total) by mouth once daily.     QUEtiapine 100 MG Tab  Commonly known as:  SEROQUEL     ranitidine 150 MG capsule  Commonly known as:  ZANTAC  Take 1 capsule (150 mg total) by mouth 2 (two) times daily.     SENNA LAX ORAL  Take 20 mg by mouth every evening.     tiZANidine 4 MG tablet  Commonly known as:  ZANAFLEX     traZODone 100 MG tablet  Commonly known as:  DESYREL  Take 2 tablets (200 mg total) by mouth every evening.        STOP taking these medications    lamoTRIgine 25 MG tablet  Commonly known as:  LAMICTAL            Indwelling Lines/Drains at time of discharge:   Lines/Drains/Airways     Drain                 Suprapubic Catheter 06/09/18 3 days                Time spent on the discharge of patient: 35 minutes  Patient was seen and examined on the date of discharge and determined to be suitable for discharge.         Isaias Anderson MD  Department of Hospital Medicine  [facility]

## 2018-06-12 NOTE — PLAN OF CARE
Problem: Patient Care Overview  Goal: Plan of Care Review  Outcome: Ongoing (interventions implemented as appropriate)  Pt on RA with documented sats.96%.  Will continue to monitor.

## 2018-06-12 NOTE — PLAN OF CARE
[facility] HOME HEALTH ORDERS  FACE TO FACE ENCOUNTER    Patient Name: Tasha Hawley  YOB: 1955    PCP: Mesfin Hodges Ii, MD   PCP Address: [address] / [place] [address]  PCP Phone Number: [phone]  PCP Fax: [phone]    Encounter Date: 06/12/2018    Admit to Home Health    Diagnoses:  Active Hospital Problems    Diagnosis  POA    *Recurrent UTI (urinary tract infection) [N39.0]  Yes    Ischemic cardiomyopathy [I25.5]  Yes     Chronic    Suprapubic catheter [Z93.59]  Not Applicable     Chronic    Bilateral carotid artery disease [I77.9]  Yes    Degenerative disc disease, lumbar [M51.36]  Yes     Chronic    Facet arthropathy, lumbar [M46.96]  Yes    S/P insertion of spinal cord stimulator [Z98.890]  Not Applicable     Chronic    S/P insertion of intrathecal pump [Z98.890]  Not Applicable     Chronic    Lymphedema of both lower extremities [I89.0]  Yes     Chronic    Primary hypothyroidism [E03.9]  Yes     Chronic    Neurogenic bladder [N31.9]  Yes     Chronic    Coronary artery disease involving native coronary artery of native heart without angina pectoris [I25.10]  Yes     Chronic    GERD (gastroesophageal reflux disease) [K21.9]  Yes     Chronic    Cervical myelopathy [G95.9]  Yes    Chronic pain syndrome [G89.4]  Yes     Chronic    Major depressive disorder, recurrent, mild [F33.0]  Yes    Generalized anxiety disorder [F41.1]  Yes    Iron deficiency anemia [D50.9]  Yes      Resolved Hospital Problems    Diagnosis Date Resolved POA    Intractable nausea and vomiting [R11.2] 06/12/2018 Yes    CARITO (acute kidney injury) [N17.9] 06/10/2018 Yes    Pyelonephritis [N12] 06/10/2018 Yes       Future Appointments  Date Time Provider Department Center   6/21/2018 9:20 AM Mesfin Hodges II, MD [facility]   6/29/2018 1:30 PM Qamar Hope OD [facility] [place]   6/29/2018 2:00 PM Qamar Hope OD [facility]  [place]   7/18/2018 9:30 AM Erik Oconnor MD [facility]   7/18/2018 10:00 AM Analilia Mercado, PhD [facility]     Follow-up Information     Mesfin Hodges Ii, MD On 6/21/2018.    Specialty:  Internal Medicine  Why:  at 09:20--, Primary Care Physician--No later appointments available.  Contact information:  [address]  [facility] [address]  [phone]                     I have seen and examined this patient face to face today. My clinical findings that support the need for the home health skilled services and home bound status are the following:  Weakness/numbness causing balance and gait disturbance due to Infection making it taxing to leave home.    Allergies:  Review of patient's allergies indicates:   Allergen Reactions    (d)-limonene flavor      Other reaction(s): difficult intubation  Other reaction(s): Difficulty breathing    Bactrim [sulfamethoxazole-trimethoprim] Anaphylaxis    Benadryl [diphenhydramine hcl] Shortness Of Breath    Imitrex [sumatriptan succinate] Shortness Of Breath    Penicillins Shortness Of Breath     Other reaction(s): Jittery    Topamax [topiramate] Shortness Of Breath    Vancomycin Shortness Of Breath     Rash    Butorphanol tartrate     Darvocet a500 [propoxyphene n-acetaminophen]      Other reaction(s): Difficulty breathing    Fentanyl      Other reaction(s): Vomiting  Other reaction(s): Nausea  Other reaction(s): Itching  swelling    White petrolatum-zinc     Zinc oxide-white petrolatum      Other reaction(s): Difficulty breathing    Latex, natural rubber Itching and Rash       Diet: cardiac diet    Activities: activity as tolerated    Nursing:   SN to complete comprehensive assessment including routine vital signs. Instruct on disease process and s/s of complications to report to MD. Review/verify medication list sent home with the patient at time of discharge  and instruct patient/caregiver as needed. Frequency may be adjusted  depending on start of care date.    Notify MD if SBP > 160 or < 90; DBP > 90 or < 50; HR > 120 or < 50; Temp > 101    CONSULTS:    Physical Therapy to evaluate and treat. Evaluate for home safety and equipment needs; Establish/upgrade home exercise program. Perform / instruct on therapeutic exercises, gait training, transfer training, and Range of Motion.  Occupational Therapy to evaluate and treat. Evaluate home environment for safety and equipment needs. Perform/Instruct on transfers, ADL training, ROM, and therapeutic exercises.  Aide to provide assistance with personal care, ADLs, and vital signs.    MISCELLANEOUS CARE:  Motley Care: Instruct patient/caregiver to empty Motley bag.  Change Motley every month.  Suprapubic catheter    WOUND CARE ORDERS  n/a      Medications: Review discharge medications with patient and family and provide education.      Current Discharge Medication List      START taking these medications    Details   cephALEXin (KEFLEX) 500 MG capsule Take 1 capsule (500 mg total) by mouth every 6 (six) hours.  Qty: 32 capsule, Refills: 0         CONTINUE these medications which have NOT CHANGED    Details   aspirin (ECOTRIN) 81 MG EC tablet Take 1 tablet (81 mg total) by mouth once daily.  Refills: 0      atorvastatin (LIPITOR) 80 MG tablet TAKE ONE TABLET BY MOUTH EVERY DAY.  Qty: 90 tablet, Refills: 3      buPROPion (WELLBUTRIN XL) 300 MG 24 hr tablet Take 300 mg by mouth once daily.      butalbital-aspirin-caffeine -40 mg (FIORINAL) -40 mg Cap Take 1 capsule by mouth 3 (three) times daily as needed.      clopidogrel (PLAVIX) 75 mg tablet Take 75 mg by mouth once daily.      DOC-Q-LACE 100 mg capsule TAKE ONE CAPSULE BY MOUTH THREE TIMES DAILY AS NEEDED FOR CONSTIPATION  Qty: 180 capsule, Refills: 3    Associated Diagnoses: Constipation, unspecified constipation type      FLUoxetine (PROZAC) 20 MG capsule Take 3 capsules (60 mg total) by mouth once daily.  Qty: 90 capsule, Refills: 1       furosemide (LASIX) 40 MG tablet Take 1 tablet (40 mg total) by mouth 2 (two) times daily.  Qty: 60 tablet, Refills: 11      hydrocodone-acetaminophen 10-325mg (NORCO)  mg Tab Take 2 tablets by mouth every 4 (four) hours as needed for Pain.       Lactobacillus acidophilus (PROBIOTIC) 10 billion cell Cap Take 1 capsule by mouth once daily.    Associated Diagnoses: Prophylactic measure      levothyroxine (SYNTHROID) 125 MCG tablet Take 1 tablet (125 mcg total) by mouth before breakfast.  Qty: 90 tablet, Refills: 3    Associated Diagnoses: Primary hypothyroidism      ondansetron (ZOFRAN-ODT) 4 MG TbDL Take 1 tablet (4 mg total) by mouth every 8 (eight) hours as needed.  Qty: 30 tablet, Refills: 0    Associated Diagnoses: Nausea      oxybutynin (DITROPAN XL) 15 MG TR24 Take 5 mg by mouth once daily.      pantoprazole (PROTONIX) 40 MG tablet Take 1 tablet (40 mg total) by mouth once daily.  Qty: 90 tablet, Refills: 3    Associated Diagnoses: Esophageal dysphagia      potassium chloride SA (K-DUR,KLOR-CON) 20 MEQ tablet TAKE THREE TABLETS BY MOUTH DAILY  Qty: 270 tablet, Refills: 3      QUEtiapine (SEROQUEL) 100 MG Tab       ranitidine (ZANTAC) 150 MG capsule Take 1 capsule (150 mg total) by mouth 2 (two) times daily.  Qty: 180 capsule, Refills: 3    Associated Diagnoses: Gastroesophageal reflux disease, esophagitis presence not specified      SENNOSIDES (SENNA LAX ORAL) Take 20 mg by mouth every evening.       tiZANidine (ZANAFLEX) 4 MG tablet       traZODone (DESYREL) 100 MG tablet Take 2 tablets (200 mg total) by mouth every evening.  Qty: 120 tablet, Refills: 4    Associated Diagnoses: Insomnia, unspecified type      docosanol (ABREVA) 10 % Crea Apply 1 application topically 2 (two) times daily.  Qty: 2 g, Refills: 0      ondansetron (ZOFRAN) 8 MG tablet TAKE ONE TABLET BY MOUTH EVERY TWELVE HOURS AS NEEDED FOR NAUSEA  Qty: 60 tablet, Refills: 2    Associated Diagnoses: Nausea         STOP taking these  medications       lamotrigine (LAMICTAL) 25 MG tablet Comments:   Reason for Stopping:               I certify that this patient is confined to her home and needs intermittent skilled nursing care, physical therapy and occupational therapy.      Isaias Anderson MD  [facility]  06/12/2018

## 2018-06-13 NOTE — PT/OT/SLP DISCHARGE
Physical Therapy Discharge Summary    Name: Tasha Hawley  MRN: [id number]   Principal Problem: Recurrent UTI (urinary tract infection)     Patient Discharged from acute Physical Therapy on 2018.  Please refer to prior PT noted date on 2018 for functional status.     Assessment:     Patient appropriate for care in another setting.    Objective:     GOALS:    Physical Therapy Goals        Problem: Physical Therapy Goal    Goal Priority Disciplines Outcome Goal Variances Interventions   Physical Therapy Goal     PT/OT, PT Ongoing (interventions implemented as appropriate)     Description:  Goals to be met by: 2019     Patient will increase functional independence with mobility by performin. Supine to sit with Modified [place]  2. Sit to stand transfer with Minimal Assistance  3. Gait  x 25 feet with Minimal Assistance using Rolling Walker.   4. Lower extremity exercise program x10 reps per handout, with supervision                      Reasons for Discontinuation of Therapy Services  Transfer to alternate level of care.      Plan:     Patient Discharged to: Home with Home Health Service.    Kristian Sin, PT  2018

## 2018-06-14 ENCOUNTER — OUTPATIENT CASE MANAGEMENT (OUTPATIENT)
Dept: ADMINISTRATIVE | Facility: OTHER | Age: 63
End: 2018-06-14

## 2018-06-14 NOTE — PROGRESS NOTES
Thank you for the referral. The following patient has been assigned to Alexandra Lazcano RN with Outpatient Complex Care Management for high risk screening.    Reason for referral: Pyelonephritis    Please contact \[facility]\"" at [phone] with any questions.    Thank you,    Jasmin Mccauley, [facility]  Outpatient Complex Care Mgmt  Ext [phone]

## 2018-06-15 ENCOUNTER — TELEPHONE (OUTPATIENT)
Dept: INTERNAL MEDICINE | Facility: CLINIC | Age: 63
End: 2018-06-15

## 2018-06-15 NOTE — TELEPHONE ENCOUNTER
----- Message from Arely Trimble sent at 6/15/2018  2:49 PM CDT -----  Contact: [facility]/[place] [phone]  Returned Call    Who left the message: Lyssa Rey MA     When did the practice call: 06/15/2108 12:56pm    Please advise.    Thank You

## 2018-06-15 NOTE — TELEPHONE ENCOUNTER
Samantha called back , Ms Cordova is saying the hospital i told her she would have pt , the agency never received any new orders  , the only thing they do now is a cath change monthly   Now they need orders    please advise

## 2018-06-15 NOTE — TELEPHONE ENCOUNTER
Give a verbal for home PT, diagnosis debility.  If she calls back or wants more adjustments, the patient will have to come for a visit.

## 2018-06-15 NOTE — TELEPHONE ENCOUNTER
----- Message from Laine Veliz sent at 6/14/2018  2:23 PM CDT -----  Contact: [facility]/[phone] ext [phone]  Nurse called in regards to getting resumption of care order due to the pt being discharge from the hospital. Fax [phone]      Please advise

## 2018-06-19 ENCOUNTER — OUTPATIENT CASE MANAGEMENT (OUTPATIENT)
Dept: ADMINISTRATIVE | Facility: OTHER | Age: 63
End: 2018-06-19

## 2018-06-19 NOTE — LETTER
June 19, 2018    Tasha LEE Chandan  [address]  [place] [address]             [facility] [address] [facility] [place] [address] Dear  And Mr. Hawley:    It was a pleasure speaking with you today. As discussed, I am enclosing information on Lymphedema, Suprapubic Catheter, Low Sodium Diet, Advanced Directives, [employer] Over the Counter Health and Wellness Catalog and pill box. Please do not get overwhelmed with the amount of information enclosed, as I will call you weekly in the beginning to review and discuss. Furthermore, I wanted to remind you that [facility] On [facility] care line [phone] or [phone] is a 24 hour 7 days per week free nurse line to assist you determine best care options for you, provides triage, appointment booking, health education and advisory services.     I can be reached Monday through Wednesday at [phone]. The Outpatient Care Management Department can be reached at [phone] from 8:00AM to 4:30 PM on Monday thru Friday.    Sincerely,          Alexandra Lazcano RN [facility]  Outpatient Complex Care Manager

## 2018-06-19 NOTE — PROGRESS NOTES
"6/19/18 Summary: Patient referred to OPCM for high risk. Initial screen completed with patient spouse Dangelo Hawley telephonically. Patient lives with spouse. They have been  for about 14 years. Spouse and patient signed a pre-nuptial contract when they got  that they would not be responsible of each others prior or current debts. Spouse states patient received a trust fund set-up about 8 to 10 years ago when "she hurt her back" that was supposed to help her pay medical expenses. Unfortunately, patient depleted funds and ran her credit cards to the max. He has torn all credit cards up. He is unable to help her pay them as he is 79 y/o retired and with his income he maintains house, car and food. States he did complete paperwork for patient with [facility] but has not heard back. States patient has many $45 co-pays with specialists as she follows with many doctors frequently. Reports patient is "hunchedback, head strong, likes to cook, but unable too. Loves to fish and go on boat rides but he does not dare put her on their boat as she has so many health issues. States this has patient little down and have made appointment with psychiatry Dr Mercado and Anastasia though not until July. Patient also has indwelling suprapubic catheter which causes her to have pain in stomach, active with pain management Dr Hillman, non[facility], has pain pump in stomach with Dilaudid. Dr Jiménez has stretched her throat several times. It was recently done, patient needs to eat small bites, was born with right facial droop. Finally, patient has been dealing with fluid in legs/ankles, not being able to fit in any shoes due to lymphedema. States finally patient's fluid has decreased that she was able to put on tennis shoe. [facility] problem also was that patient sat in recliner all day and feet were not elevated above heart. He has bought her a lift chair to assist.  Active with [facility] " who assists with baths when they can and with daily meals. They pay $5 per week for one meal/day delivered to them. He believes patient may have completed advanced directives before they got , but he does not have them and I was unable to find it in EPIC either. Requests Advanced Directives be mailed to them. He has a daughter Jaycee Hawley who is a Nurse Practitioner and she has daughter Lisa Thompson that assist him manage health issues.       Interventions: Completed all OPCM forms. No discrepancy in medication noted. Reviewed Caring for suprapubic catheter KRAMES, Lymphedema KRAMES, low sodium diet KRAMES, and Advanced Directives. Patient spouse verbalizes understanding. Also mailing [employer] Over [employer] Counter Health and Wellness Catalog as not aware of benefit. Referring to [facility] LCSW to assist with resources. Mailing pill box as patient does not have one with 4 time slots.    Plan:  UTI/Suprapubic Catheter teaching  Lymphedema teaching   Low sodium diet  Fall Prevention in Home  Advanced Directives mailed 6/19  [employer] Over [employer] Counter Health and Wellness Catalog - mailed 6/19  Pill Box mailed 6/19  Refer to [facility] LCSW Vinay Mota to assist with resources

## 2018-06-20 NOTE — PHYSICIAN QUERY
PT Name: Tasha Hawley  MR #: [id number]    Physician Query Form - Cause and Effect Relationship Clarification      CDS: Shannon Chavira RN     Contact information:  [email or web address]    Phone: [phone]    This form is a permanent document in the medical record.     Query Date: June 20, 2018    By submitting this query, we are merely seeking further clarification of documentation. Please utilize your independent clinical judgment when addressing the question(s) below.    The Medical record contains the following:  Supporting Clinical Findings   Location in record    Principal Problem:Recurrent UTI (urinary tract infection)    1. Suprapubic tube exchanged without any difficulty, 20 Sami with 30cc in balloon per patient request.   2. New urine sample from suprapubic tube sent for culture. Would recommend to target antibiotic treatment based off of these results rather than the one collected from the previous indwelling suprapubic tube which is likely catheterized.       STAPHYLOCOCCUS AUREUS > 100,000 cfu/ml   PROTEUS MIRABILIS >100,000 cfu/ml       STAPHYLOCOCCUS AUREUS > 100,000 cfu/ml                                                                                                                                                                                 H&P 6/8/18    Urology consult 6/8/18                  Urine C&S 6/8/18 0127       Urine C&S 6/8/18 1214                                                                                                                                                                                                       Provider, please clarify if there is any correlation between __Suprapubic catheter___ and __UTI_.           Are the conditions:     [x ] Due to or associated with each other     [  ] Unrelated to each other     [  ] Other (Please Specify): _________________________     [  ] Clinically Undetermined

## 2018-06-21 ENCOUNTER — OFFICE VISIT (OUTPATIENT)
Dept: INTERNAL MEDICINE | Facility: CLINIC | Age: 63
End: 2018-06-21
Payer: MEDICARE

## 2018-06-21 VITALS
WEIGHT: 157 LBS | SYSTOLIC BLOOD PRESSURE: 90 MMHG | HEART RATE: 58 BPM | BODY MASS INDEX: 26.8 KG/M2 | HEIGHT: 64 IN | DIASTOLIC BLOOD PRESSURE: 54 MMHG

## 2018-06-21 DIAGNOSIS — N39.0 RECURRENT UTI (URINARY TRACT INFECTION): ICD-10-CM

## 2018-06-21 DIAGNOSIS — M51.36 DEGENERATIVE DISC DISEASE, LUMBAR: Chronic | ICD-10-CM

## 2018-06-21 DIAGNOSIS — G89.4 CHRONIC PAIN SYNDROME: Primary | Chronic | ICD-10-CM

## 2018-06-21 DIAGNOSIS — F41.1 GENERALIZED ANXIETY DISORDER: ICD-10-CM

## 2018-06-21 DIAGNOSIS — F11.20 NARCOTIC DEPENDENCY, CONTINUOUS: Chronic | ICD-10-CM

## 2018-06-21 DIAGNOSIS — G47.01 INSOMNIA DUE TO MEDICAL CONDITION: Chronic | ICD-10-CM

## 2018-06-21 DIAGNOSIS — I89.0 LYMPHEDEMA OF BOTH LOWER EXTREMITIES: Chronic | ICD-10-CM

## 2018-06-21 DIAGNOSIS — Z93.59 SUPRAPUBIC CATHETER: Chronic | ICD-10-CM

## 2018-06-21 PROBLEM — H04.123 INSUFFICIENCY OF TEAR FILM OF BOTH EYES: Status: RESOLVED | Noted: 2018-05-17 | Resolved: 2018-06-21

## 2018-06-21 PROCEDURE — 99495 TRANSJ CARE MGMT MOD F2F 14D: CPT | Mod: S$GLB,,, | Performed by: INTERNAL MEDICINE

## 2018-06-21 PROCEDURE — 99499 UNLISTED E&M SERVICE: CPT | Mod: S$PBB,,, | Performed by: INTERNAL MEDICINE

## 2018-06-21 PROCEDURE — 99999 PR PBB SHADOW E&M-EST. PATIENT-LVL III: CPT | Mod: PBBFAC,,, | Performed by: INTERNAL MEDICINE

## 2018-06-21 RX ORDER — POTASSIUM CHLORIDE 750 MG/1
TABLET, EXTENDED RELEASE ORAL
COMMUNITY
Start: 2018-06-13 | End: 2018-09-14

## 2018-06-21 NOTE — PROGRESS NOTES
Transitional Care Note  Subjective:       Patient ID: Tasha Hawley is a 62 y.o. female.  Chief Complaint: Follow-up    Family and/or Caretaker present at visit?  Yes.  Diagnostic tests reviewed/disposition: No diagnosic tests pending after this hospitalization.  Disease/illness education: discussed recurrent UTI's  Home health/community services discussion/referrals: Patient has home health established at somewhere.   Establishment or re-establishment of referral orders for community resources: No other necessary community resources.   Discussion with other health care providers: No discussion with other health care providers necessary.   HPI  Review of Systems    Objective:      Physical Exam    Assessment:       1. Chronic pain syndrome    2. Degenerative disc disease, lumbar    3. Generalized anxiety disorder    4. Insomnia due to medical condition    5. Lymphedema of both lower extremities    6. Narcotic dependency, continuous    7. Recurrent UTI (urinary tract infection)    8. Suprapubic catheter        Plan:       Please see my note from this date for details of my assessment

## 2018-06-21 NOTE — PROGRESS NOTES
Subjective:       Patient ID: Tasha Hawley is a 62 y.o. female.    Chief Complaint: Follow-up    HPI - I last saw Ms Hawley one month ago; since that time, she has accessed [facility] 14 times, including telephone calls, Emails, ER visits and office visits.  She was admitted through the ER for a UTI 6/7-12.  Today, she is complaining of back pain, a chronic problem that seems intractable.  She's followed by an outside pain clinic and will discuss this with that provider.  Her LE edema has all but resolved over the past few days; she is pleased with this.  She c/o fatigue, which is also a chronic complaint that has defied workup.  She's not doing any exercise, though she can walk occasionally when back pain is quiescent.  She is not a cigarette smoker    PMH:  Chronic insomnia.  Chronic low back pain with narcotic use.  Indwelling narcotic pump  History CVA with dysarthria  Neurogenic bladder, with recurrent UTI's.  SP catheter placed Nov 2016  Hypothyroidism, stable  CECI, not on CPAP  CAD, with ACS in Jan 2016 - subtot mid LAD lesion without PCI; ischemic CM with EF 40% and chronic LE edema. Followed by [facility]  Anxiety and Depression  Chronic constipation, likely d/t ongoing narcotic use  Intermittent nausea  Chronic bilateral LE lymphedema     Meds: Reviewed and reconciled in EPIC with patient during visit today.    Review of Systems   Constitutional: Positive for fatigue.   HENT: Negative for congestion.    Respiratory: Negative for shortness of breath.    Cardiovascular: Negative for leg swelling.   Gastrointestinal: Negative for abdominal pain.   Genitourinary: Negative for difficulty urinating.   Musculoskeletal: Positive for back pain.   Skin: Negative for rash.   Neurological: Negative for headaches.   Psychiatric/Behavioral: Positive for sleep disturbance.       Objective:      Physical Exam   Constitutional: She is oriented to person, place, and time. She appears well-developed and  well-nourished.   Frail, chronically ill-appearing woman examined in wheelchair   HENT:   Head: Normocephalic and atraumatic.   Cardiovascular: Normal rate, regular rhythm and normal heart sounds.  Exam reveals no gallop and no friction rub.    No murmur heard.  Pulmonary/Chest: Effort normal and breath sounds normal. No respiratory distress. She has no wheezes. She has no rales. She exhibits no tenderness.   Neurological: She is alert and oriented to person, place, and time.   Skin: Skin is warm and dry. No erythema.   Psychiatric: She has a normal mood and affect.   Nursing note and vitals reviewed.      Assessment:       1. Chronic pain syndrome    2. Degenerative disc disease, lumbar    3. Generalized anxiety disorder    4. Insomnia due to medical condition    5. Lymphedema of both lower extremities    6. Narcotic dependency, continuous    7. Recurrent UTI (urinary tract infection)    8. Suprapubic catheter        Plan:       Tasha was seen today for follow-up.    Diagnoses and all orders for this visit:    Chronic pain syndrome - worse today; waxes and wanes.  Deferred to pain management    Degenerative disc disease, lumbar    Generalized anxiety disorder - stable.  Exacerbates chronic pain disorder    Insomnia due to medical condition    Lymphedema of both lower extremities    Narcotic dependency, continuous    Recurrent UTI (urinary tract infection) - stable without current symptoms    Suprapubic catheter    rtc prn, or in 3 months    PAPO Hodges MD MPH  Staff Internist

## 2018-06-25 ENCOUNTER — TELEPHONE (OUTPATIENT)
Dept: INTERNAL MEDICINE | Facility: CLINIC | Age: 63
End: 2018-06-25

## 2018-06-25 NOTE — TELEPHONE ENCOUNTER
----- Message from Sampson Bai sent at 6/25/2018 11:58 AM CDT -----  Contact: H/H Celina [facility] [phone]  H/H stating thepatient is needing orders to send him for PT and OT, [facility] Fax [phone]    Please call an advise  Thank you

## 2018-06-26 ENCOUNTER — OUTPATIENT CASE MANAGEMENT (OUTPATIENT)
Dept: ADMINISTRATIVE | Facility: OTHER | Age: 63
End: 2018-06-26

## 2018-06-26 NOTE — PROGRESS NOTES
"6/26/2018:     spoke to patient in an attempt to complete assessment.  She reported that she was "swamped right now" and requested that  call back tomorrow after 11:00  "

## 2018-06-26 NOTE — PROGRESS NOTES
6/26/18 Summary: Follow-up call with spouse who reports he is not home and requests call back tomorrow Wednesday between 10 AM -12 noon. Does report receiving OPCM packet. Reprots he gave it to patient, but believes she has not opened it.     Interventions: Call tomorrow Wednesday     Plan:  UTI/Suprapubic Catheter teaching  Lymphedema teaching   Low sodium diet  Fall Prevention in Home  Advanced Directives mailed 6/19  [employer] Over [employer] Health and Wellness Catalog - mailed 6/19  Pill Box mailed 6/19  Refer to OPCM LCSW Vinay Mota to assist with resources

## 2018-06-27 ENCOUNTER — OUTPATIENT CASE MANAGEMENT (OUTPATIENT)
Dept: ADMINISTRATIVE | Facility: OTHER | Age: 63
End: 2018-06-27

## 2018-06-27 NOTE — PROGRESS NOTES
6/27/18 Summary: Follow-up call attempted with spouse. No answer. 1:04 PM Follow-up call with patient's spouse. States they have opened OPCM packet and started reviewing information. States they did order from [employer] O[employer] catalog for both of them. Spouse rushing call.     Interventions: Voice message left requesting call back. 1:04 PM Reviewed low sodium KRAMES teaching and fall prevention KRAMES. Spouse verbalizes understanding. Reviewed Advanced Directives. Verbalizes understanding. Informed to utilize 24 hr [facility] if needs arise. Informed this OPCM nurse will be out of office week 7/2/18, will follow upon return.       Plan:  UTI/Suprapubic Catheter teaching  Lymphedema teaching   Low sodium diet  Fall Prevention in Home  Advanced Directives mailed 6/19  [employer] Over [employer] Counter Health and Wellness Catalog - mailed 6/19 -DONE receiving benefit 6/27/18  Pill Box mailed 6/19 - received 6/27/18  Refer to OPCM LCSW Vinay Mota to assist with resources

## 2018-06-28 ENCOUNTER — OUTPATIENT CASE MANAGEMENT (OUTPATIENT)
Dept: ADMINISTRATIVE | Facility: OTHER | Age: 63
End: 2018-06-28

## 2018-06-28 NOTE — PROGRESS NOTES
6/28/2018:  SUMMARY:   left a message on patient's voicemail in an attempt to complete assessment.  Mailed letter to patient's home.  Will close case on 7/12 if patient does not return my calls by then.

## 2018-06-28 NOTE — LETTER
June 28, 2018    Tasha LEE Chandan  [address]  [place] [address]             [facility] [address] [place] [address] Dear Ms. Anayaelo:    My name is Vinay Castro LCSW and I am a  with the Outpatient Complex Case Management Department at [facility].  I have recently tried to contact you in order to complete a social work assessment for possible assistance in the home.  I was unable to reach you.  If you would like to discuss any concerns or services you may need, please contact me at [phone].    If you have any questions or concerns, please don't hesitate to call.    Sincerely,        Vinay Castro LCSW-[facility]    III  Outpatient Complex Case Management

## 2018-07-06 ENCOUNTER — TELEPHONE (OUTPATIENT)
Dept: INTERNAL MEDICINE | Facility: CLINIC | Age: 63
End: 2018-07-06

## 2018-07-06 NOTE — TELEPHONE ENCOUNTER
----- Message from Karla Barboza sent at 7/5/2018  2:03 PM CDT -----  Contact: [facility]/[phone]  Dion called in regards needing a change of medication per patient request from potassium chloride SA (K-DUR,KLOR-CON) 20 MEQ tablet, to  potassium citri extended release 540. Please call and advise. [facility] [place] - [address] [phone] (Phone) [phone] (Fax)  Thank you

## 2018-07-09 ENCOUNTER — OUTPATIENT CASE MANAGEMENT (OUTPATIENT)
Dept: ADMINISTRATIVE | Facility: OTHER | Age: 63
End: 2018-07-09

## 2018-07-09 NOTE — PROGRESS NOTES
7/9/18 Summary: Follow-up call with patient's spouse Dangelo. Reports patient's catheter was draining bloody urine 2 days ago and some yesterday, but today has not seen it as patient remains sleeping and he has not seen her today. Does deny pain, fever, cloudy/foul smelling urine, confusion. Does reprots wife hangs bag on walker and then she ambulates, could have pulled on it. States home health was visiting last week and urine was clear yellow, looking good. Unable to state when home health is due to come again. Reports legs are looking good as patient has been laying down more and following lymphedema instructions. 12:53 Called back spouse. Reports patient remains in bed a she is tired and was having pain. States urine is clear light yellow. [facility] health is [place] nurse Dina [phone].    Interventions: Reviewed Suprapubic Catheter KRAMES teaching and Lymphedema KRAMES. Spouse verbalizes understanding. Will call back in 2 hours for spouse to check catheter/urine and provide home health nurse name. Instructed to call home health/[facility] on call 24 hr nurse line and MD if urine becomes red, cloudy has fever, etc. Verbalizes understanding.       Plan:  UTI/Suprapubic Catheter teaching  Lymphedema teaching   Low sodium diet  Fall Prevention in Home  Advanced Directives mailed 6/19  [employer] Over [employer] Counter Health and Wellness Catalog - mailed 6/19 -DONE receiving benefit 6/27/18  Pill Box mailed 6/19 - received 6/27/18  Refer to [facility] LCSW Vinay Mota to assist with resources - DONE

## 2018-07-18 ENCOUNTER — OFFICE VISIT (OUTPATIENT)
Dept: PSYCHIATRY | Facility: CLINIC | Age: 63
End: 2018-07-18
Payer: MEDICARE

## 2018-07-18 VITALS
BODY MASS INDEX: 29.64 KG/M2 | DIASTOLIC BLOOD PRESSURE: 50 MMHG | SYSTOLIC BLOOD PRESSURE: 104 MMHG | HEART RATE: 59 BPM | HEIGHT: 64 IN | WEIGHT: 173.63 LBS

## 2018-07-18 DIAGNOSIS — G47.00 INSOMNIA, UNSPECIFIED TYPE: ICD-10-CM

## 2018-07-18 DIAGNOSIS — F33.41 RECURRENT MAJOR DEPRESSION IN PARTIAL REMISSION: Primary | ICD-10-CM

## 2018-07-18 DIAGNOSIS — F33.0 MAJOR DEPRESSIVE DISORDER, RECURRENT, MILD: Primary | ICD-10-CM

## 2018-07-18 DIAGNOSIS — F41.1 GENERALIZED ANXIETY DISORDER: ICD-10-CM

## 2018-07-18 PROCEDURE — 3008F BODY MASS INDEX DOCD: CPT | Mod: CPTII,S$GLB,, | Performed by: PSYCHIATRY & NEUROLOGY

## 2018-07-18 PROCEDURE — 99999 PR PBB SHADOW E&M-EST. PATIENT-LVL I: CPT | Mod: PBBFAC,,, | Performed by: PSYCHOLOGIST

## 2018-07-18 PROCEDURE — 99214 OFFICE O/P EST MOD 30 MIN: CPT | Mod: S$GLB,,, | Performed by: PSYCHIATRY & NEUROLOGY

## 2018-07-18 PROCEDURE — 99999 PR PBB SHADOW E&M-EST. PATIENT-LVL III: CPT | Mod: PBBFAC,,, | Performed by: PSYCHIATRY & NEUROLOGY

## 2018-07-18 PROCEDURE — 90834 PSYTX W PT 45 MINUTES: CPT | Mod: S$GLB,,, | Performed by: PSYCHOLOGIST

## 2018-07-18 RX ORDER — BUPROPION HYDROCHLORIDE 300 MG/1
300 TABLET ORAL DAILY
Qty: 30 TABLET | Refills: 3 | Status: SHIPPED | OUTPATIENT
Start: 2018-07-18 | End: 2018-10-16 | Stop reason: SDUPTHER

## 2018-07-18 RX ORDER — FLUOXETINE HYDROCHLORIDE 20 MG/1
60 CAPSULE ORAL DAILY
Qty: 90 CAPSULE | Refills: 3 | Status: SHIPPED | OUTPATIENT
Start: 2018-07-18 | End: 2018-10-16 | Stop reason: SDUPTHER

## 2018-07-18 RX ORDER — TRAZODONE HYDROCHLORIDE 100 MG/1
200 TABLET ORAL NIGHTLY
Qty: 60 TABLET | Refills: 3 | Status: SHIPPED | OUTPATIENT
Start: 2018-07-18 | End: 2018-10-16 | Stop reason: SDUPTHER

## 2018-07-18 RX ORDER — QUETIAPINE FUMARATE 100 MG/1
100 TABLET, FILM COATED ORAL NIGHTLY
Qty: 30 TABLET | Refills: 3 | Status: SHIPPED | OUTPATIENT
Start: 2018-07-18 | End: 2018-10-16 | Stop reason: SDUPTHER

## 2018-07-18 NOTE — PROGRESS NOTES
"Outpatient Psychiatry Follow-Up Visit (MD/NP)    7/18/2018    Clinical Status of Patient:  Outpatient (Ambulatory)    Chief Complaint:  Tasha Hawley is a 62 y.o. female who presents today for follow-up of depression and anxiety.  Met with patient.      Interval History and Content of Current Session:  Interim Events/Subjective Report/Content of Current Session: She is doing well.  Her mood is good, joking around with me.  Getting along better with family.  Coping better with her medical problems.  No complaints about meds.  Now off benzos.    Psychotherapy:  · Target symptoms: depression, anxiety   · Why chosen therapy is appropriate versus another modality: relevant to diagnosis  · Outcome monitoring methods: self-report, observation  · Therapeutic intervention type: supportive psychotherapy  · Topics discussed/themes: stress related to medical comorbidities, building skills sets for symptom management, symptom recognition  · The patient's response to the intervention is accepting. The patient's progress toward treatment goals is good.   · Duration of intervention: 10 minutes.    Review of Systems   · PSYCHIATRIC: Pertinant items are noted in the narrative.    Past Medical, Family and Social History: The patient's past medical, family and social history have been reviewed and updated as appropriate within the electronic medical record - see encounter notes.    Compliance: yes    Side effects: None    Risk Parameters:  Patient reports no suicidal ideation  Patient reports no homicidal ideation  Patient reports no self-injurious behavior  Patient reports no violent behavior    Exam (detailed: at least 9 elements; comprehensive: all 15 elements)   Constitutional  Vitals:  Most recent vital signs, dated less than 90 days prior to this appointment, were reviewed.   Vitals:    07/18/18 0917   BP: (!) 104/50   Pulse: (!) 59   Weight: 78.8 kg (173 lb 9.8 oz)   Height: 5' 4" (1.626 m)        General:  age appropriate, " uses walker, catheter in place.     Musculoskeletal  Muscle Strength/Tone:  no tremor   Gait & Station:  uses walker     Psychiatric  Speech:  no latency; no press   Mood & Affect:  euthymic  congruent and appropriate   Thought Process:  normal and logical   Associations:  intact   Thought Content:  normal, no suicidality, no homicidality, delusions, or paranoia   Insight:  intact   Judgement: behavior is adequate to circumstances   Orientation:  grossly intact   Memory: intact for content of interview   Language: grossly intact   Attention Span & Concentration:  able to focus   Fund of Knowledge:  intact and appropriate to age and level of education     Assessment and Diagnosis   Status/Progress: Based on the examination today, the patient's problem(s) is/are improved.  New problems have not been presented today.   Co-morbidities are complicating management of the primary condition.  There are no active rule-out diagnoses for this patient at this time.     General Impression: Doing better      ICD-10-CM ICD-9-CM   1. Recurrent major depression in partial remission F33.41 296.35   2. Insomnia, unspecified type G47.00 780.52       Intervention/Counseling/Treatment Plan   · Medication Management: Continue current medications.      Return to Clinic: 3 months

## 2018-07-18 NOTE — PROGRESS NOTES
"Individual Psychotherapy (PhD/LCSW)    7/18/2018    Site:  [facility]         Therapeutic Intervention: Met with patient.  Outpatient - Insight oriented psychotherapy 45 min - CPT code 30938    Chief complaint/reason for encounter: depression, anger and anxiety     Interval history and content of current session:  Tasha arrived on time for her scheduled appointment.  She has not been seen since 04/05/2018.  She was informed that due to her history of no-shows and late cancellations she will not be able to schedule with me if she has one more.  It was explained to her that inconsistent therapy is not effective to help her, and that the opportunity must be provided to other patients who may come more consistently.  Overall, Tasha reports that her mood and functioning has improved.  Since being hospitalized, she has had less fluid in her legs and is able to sleep in her bed.  Interpersonally, she reports improved relations with her  and believes she is being more patient with him.  She requested that he be more affectionate with her, and she believes they are making improvements.  She has been feeling less insecure, and has also avoided "overdoing things."  She now has a  who spends several days per week with her to help her with household chores and healthcare issues.  She has been improving self-care in keeping "my mind busy," and she engages in puzzles, gardening, cooking, and spending time with family.  Due to her improvements, she asked Dr. Oconnor whether she could stop therapy and he encouraged her to continue.  We made plans for her to follow-up on days she is scheduled to see him.    Treatment plan:  · Target symptoms: depression, anxiety   · Why chosen therapy is appropriate versus another modality: relevant to diagnosis  · Outcome monitoring methods: self-report  · Therapeutic intervention type: insight oriented psychotherapy    Risk parameters:  Patient reports no " suicidal ideation  Patient reports no homicidal ideation  Patient reports no self-injurious behavior  Patient reports no violent behavior    Verbal deficits: None    Patient's response to intervention:  The patient's response to intervention is accepting.    Progress toward goals and other mental status changes:  The patient's progress toward goals is fair .    Diagnosis:     ICD-10-CM ICD-9-CM   1. Major depressive disorder, recurrent, mild F33.0 296.31   2. Generalized anxiety disorder F41.1 300.02       Plan:  individual psychotherapy    Return to clinic: 1 month    Length of Service (minutes): 45

## 2018-07-23 ENCOUNTER — TELEPHONE (OUTPATIENT)
Dept: UROLOGY | Facility: CLINIC | Age: 63
End: 2018-07-23

## 2018-07-23 NOTE — TELEPHONE ENCOUNTER
Saray mike/ Osei, states pt has cloudy, fishy smelly urine, bladder pressure along w/ sediment and clots. Advised to collect specimen from new catheter for UCx.     Pt refused catheter change and culture from new catheter. Pt is scheduled to come in with NP on 7/24 for evaluation with SPtube change and culture. Notified Dr. Mayo.

## 2018-07-23 NOTE — TELEPHONE ENCOUNTER
----- Message from Suyapa Batista sent at 7/23/2018 11:37 AM CDT -----  Needs Advice    Reason for call:   Possible infection with catheter     Communication Preference: Saray Franz  [phone]  Additional Information:

## 2018-07-24 ENCOUNTER — OUTPATIENT CASE MANAGEMENT (OUTPATIENT)
Dept: ADMINISTRATIVE | Facility: OTHER | Age: 63
End: 2018-07-24

## 2018-07-24 ENCOUNTER — OFFICE VISIT (OUTPATIENT)
Dept: UROLOGY | Facility: CLINIC | Age: 63
End: 2018-07-24
Payer: MEDICARE

## 2018-07-24 VITALS — HEART RATE: 67 BPM | SYSTOLIC BLOOD PRESSURE: 117 MMHG | DIASTOLIC BLOOD PRESSURE: 58 MMHG

## 2018-07-24 DIAGNOSIS — R39.15 URGENCY OF URINATION: ICD-10-CM

## 2018-07-24 DIAGNOSIS — R82.90 ABNORMAL URINE ODOR: ICD-10-CM

## 2018-07-24 DIAGNOSIS — Z93.59 CHRONIC SUPRAPUBIC CATHETER: ICD-10-CM

## 2018-07-24 DIAGNOSIS — R33.9 URINARY RETENTION: ICD-10-CM

## 2018-07-24 DIAGNOSIS — N31.9 NEUROGENIC BLADDER: Primary | ICD-10-CM

## 2018-07-24 LAB
BACTERIA #/AREA URNS AUTO: ABNORMAL /HPF
BILIRUB UR QL STRIP: NEGATIVE
CLARITY UR REFRACT.AUTO: ABNORMAL
COLOR UR AUTO: ABNORMAL
GLUCOSE UR QL STRIP: NEGATIVE
HGB UR QL STRIP: ABNORMAL
HYALINE CASTS UR QL AUTO: 0 /LPF
KETONES UR QL STRIP: NEGATIVE
LEUKOCYTE ESTERASE UR QL STRIP: ABNORMAL
MICROSCOPIC COMMENT: ABNORMAL
NITRITE UR QL STRIP: POSITIVE
PH UR STRIP: 8 [PH] (ref 5–8)
PROT UR QL STRIP: ABNORMAL
RBC #/AREA URNS AUTO: >100 /HPF (ref 0–4)
SP GR UR STRIP: 1 (ref 1–1.03)
URN SPEC COLLECT METH UR: ABNORMAL
UROBILINOGEN UR STRIP-ACNC: NEGATIVE EU/DL
WBC #/AREA URNS AUTO: 27 /HPF (ref 0–5)

## 2018-07-24 PROCEDURE — 99999 PR PBB SHADOW E&M-EST. PATIENT-LVL V: CPT | Mod: PBBFAC,,, | Performed by: NURSE PRACTITIONER

## 2018-07-24 PROCEDURE — 99214 OFFICE O/P EST MOD 30 MIN: CPT | Mod: 25,S$GLB,, | Performed by: NURSE PRACTITIONER

## 2018-07-24 PROCEDURE — 87086 URINE CULTURE/COLONY COUNT: CPT

## 2018-07-24 PROCEDURE — 81001 URINALYSIS AUTO W/SCOPE: CPT

## 2018-07-24 PROCEDURE — 51705 CHANGE OF BLADDER TUBE: CPT | Mod: S$GLB,,, | Performed by: NURSE PRACTITIONER

## 2018-07-24 NOTE — PROGRESS NOTES
Subjective:       Patient ID: Tasha Hawley is a 62 y.o. female.    Chief Complaint: Urinary Tract Infection and Neurogenic Bladder    Tasha Hawley is a 62 y.o. female with neurogenic bladder with suprapubic catheter in place with stress urinary incontinence on previous urodynamics.    03/20/18 Procedure(s) Performed:   1.  Cystoscopy with bladder botox injection 200 U injected  2.  Bladder biopsy and fulguration   3.  Injection of urethral bulking agent-- Macroplastique, 2.5 ml injected into the urethra  4.  Exchange of suprapubic catheter by MD    Findings:   - area of catheter irritation on posterior wall, biopsied and fulgurated.     - 200 U botox injected   - good coaptation of urethra after macroplastique injection   - suprapubic catheter exchanged    She was last seen in clinic with Dr. Mayo 05/07/2018    She is here today due to having dysuria, abdominal pain/pressure, foul smelling urine.  She has been having a little leaking from the urethra as well; this had stopped after her surgery.  She concerned for UTI;   She has been managing her urinary retention with 20fr Gold Silicone catheters changed by [facility].  She wanted to come in today to have it changed.           Past Medical History:  No date: Anticoagulant long-term use  No date: Anxiety  No date: Arthritis  No date: Bilateral lower extremity edema      Comment: severe chronic  No date: Carotid artery occlusion  No date: Cataract  No date: Depression  No date: Fever blister  No date: Hypothyroid  No date: Iron deficiency anemia  No date: Lumbar radiculopathy      Comment: with chronic pain  No date: Ocular migraine  No date: Sleep apnea      Comment: cpap    Past Surgical History:  No date: BACK SURGERY      Comment: x 12  No date: CATARACT EXTRACTION W/  INTRAOCULAR LENS IMPLA* Left      Comment: Dr Coleman   5/23/2018: ESOPHAGOGASTRODUODENOSCOPY N/A      Comment: Procedure: ESOPHAGOGASTRODUODENOSCOPY (EGD);                  Surgeon: Prince Vance MD;  Location: [facility] ([address] [place] [address]);  Service: Endoscopy;                 Laterality: N/A;  r/s 'd per Dr. Vance due                to family emergency- ER  1975: HYSTERECTOMY      Comment: endometriosis  No date: pain pump placement      Comment: SQ Dilaudid Pump managed by Dr. Hillman, Pain                Management  No date: SPINAL CORD STIMULATOR REMOVAL      Comment: before [place]  5-13-13: SPINE SURGERY      Comment: CERVICAL FUSION    Review of patient's family history indicates:  Problem: Cancer      Relation: Mother       Age of Onset: 55       Comment: breast  Problem: Cancer      Relation: Father       Age of Onset: (Not Specified)       Comment: esophagus,had laryngectomy  Problem: Esophageal cancer      Relation: Father       Age of Onset: (Not Specified)   Problem: Parkinsonism      Relation: Maternal Grandmother       Age of Onset: (Not Specified)   Problem: Tremor      Relation: Maternal Grandmother       Age of Onset: (Not Specified)   Problem: No Known Problems      Relation: Brother       Age of Onset: (Not Specified)   Problem: No Known Problems      Relation: Brother       Age of Onset: (Not Specified)   Problem: Heart disease      Relation: Maternal Uncle       Age of Onset: (Not Specified)   Problem: No Known Problems      Relation: Sister       Age of Onset: (Not Specified)   Problem: No Known Problems      Relation: Maternal Aunt       Age of Onset: (Not Specified)   Problem: No Known Problems      Relation: Paternal Aunt       Age of Onset: (Not Specified)   Problem: No Known Problems      Relation: Paternal Uncle       Age of Onset: (Not Specified)   Problem: No Known Problems      Relation: Maternal Grandfather       Age of Onset: (Not Specified)   Problem: No Known Problems      Relation: Paternal Grandmother       Age of Onset: (Not Specified)   Problem: No Known Problems      Relation: Paternal Grandfather       Age of Onset: (Not Specified)    Problem: Melanoma      Relation: Neg Hx       Age of Onset: (Not Specified)   Problem: Amblyopia      Relation: Neg Hx       Age of Onset: (Not Specified)   Problem: Blindness      Relation: Neg Hx       Age of Onset: (Not Specified)   Problem: Cataracts      Relation: Neg Hx       Age of Onset: (Not Specified)   Problem: Diabetes      Relation: Neg Hx       Age of Onset: (Not Specified)   Problem: Glaucoma      Relation: Neg Hx       Age of Onset: (Not Specified)   Problem: Hypertension      Relation: Neg Hx       Age of Onset: (Not Specified)   Problem: Macular degeneration      Relation: Neg Hx       Age of Onset: (Not Specified)   Problem: Retinal detachment      Relation: Neg Hx       Age of Onset: (Not Specified)   Problem: Strabismus      Relation: Neg Hx       Age of Onset: (Not Specified)   Problem: Stroke      Relation: Neg Hx       Age of Onset: (Not Specified)   Problem: Thyroid disease      Relation: Neg Hx       Age of Onset: (Not Specified)   Problem: Colon cancer      Relation: Neg Hx       Age of Onset: (Not Specified)       Social History    Marital status:              Spouse name:                       Years of education:                 Number of children:               Occupational History    None on file    Social History Main Topics    Smoking status: Never Smoker                                                                Smokeless tobacco: Never Used                        Alcohol use: No                 Comment: denies    Drug use: No              Sexual activity: No                   Other Topics            Concern    None on file    Social History Narrative    None on file        Allergies:  (d)-limonene flavor; Bactrim (sulfamethoxazole-trimethoprim); Benadryl (diphenhydramine hcl); Imitrex (sumatriptan succinate); Penicillins; Topamax (topiramate); Vancomycin; Butorphanol tartrate; Darvocet a500 (propoxyphene n-acetaminophen); Fentanyl; White petrolatum-zinc; Zinc  oxide-white petrolatum; and Latex, natural rubber    Medications:  Current Outpatient Prescriptions:   aspirin (ECOTRIN) 81 MG EC tablet, Take 1 tablet (81 mg total) by mouth once daily., Disp: , Rfl: 0  atorvastatin (LIPITOR) 80 MG tablet, TAKE ONE TABLET BY MOUTH EVERY DAY. (Patient taking differently: TAKE ONE TABLET BY MOUTH Nightly), Disp: 90 tablet, Rfl: 3  buPROPion (WELLBUTRIN XL) 300 MG 24 hr tablet, Take 1 tablet (300 mg total) by mouth once daily., Disp: 30 tablet, Rfl: 3  butalbital-aspirin-caffeine -40 mg (FIORINAL) -40 mg Cap, Take 1 capsule by mouth 3 (three) times daily as needed., Disp: , Rfl:   clopidogrel (PLAVIX) 75 mg tablet, Take 75 mg by mouth once daily., Disp: , Rfl:   DOC-Q-LACE 100 mg capsule, TAKE ONE CAPSULE BY MOUTH THREE TIMES DAILY AS NEEDED FOR CONSTIPATION, Disp: 180 capsule, Rfl: 3  docosanol (ABREVA) 10 % Crea, Apply 1 application topically 2 (two) times daily., Disp: 2 g, Rfl: 0  FLUoxetine (PROZAC) 20 MG capsule, Take 3 capsules (60 mg total) by mouth once daily., Disp: 90 capsule, Rfl: 3  furosemide (LASIX) 40 MG tablet, Take 1 tablet (40 mg total) by mouth 2 (two) times daily., Disp: 60 tablet, Rfl: 11  hydrocodone-acetaminophen 10-325mg (NORCO)  mg Tab, Take 2 tablets by mouth every 4 (four) hours as needed for Pain. , Disp: , Rfl:   Lactobacillus acidophilus (PROBIOTIC) 10 billion cell Cap, Take 1 capsule by mouth once daily., Disp: , Rfl:   levothyroxine (SYNTHROID) 125 MCG tablet, Take 1 tablet (125 mcg total) by mouth before breakfast., Disp: 90 tablet, Rfl: 3  ondansetron (ZOFRAN) 8 MG tablet, TAKE ONE TABLET BY MOUTH EVERY TWELVE HOURS AS NEEDED FOR NAUSEA, Disp: 60 tablet, Rfl: 2  ondansetron (ZOFRAN-ODT) 4 MG TbDL, Take 1 tablet (4 mg total) by mouth every 8 (eight) hours as needed., Disp: 30 tablet, Rfl: 0  oxybutynin (DITROPAN XL) 15 MG TR24, Take 5 mg by mouth once daily., Disp: , Rfl:   pantoprazole (PROTONIX) 40 MG tablet, Take 1  tablet (40 mg total) by mouth once daily., Disp: 90 tablet, Rfl: 3  potassium chloride (KLOR-CON) 10 MEQ TbSR, , Disp: , Rfl:   potassium chloride SA (K-DUR,KLOR-CON) 20 MEQ tablet, TAKE THREE TABLETS BY MOUTH DAILY (Patient taking differently: TAKE Two TABLETS BY MOUTH DAILY), Disp: 270 tablet, Rfl: 3  QUEtiapine (SEROQUEL) 100 MG Tab, Take 1 tablet (100 mg total) by mouth every evening., Disp: 30 tablet, Rfl: 3  ranitidine (ZANTAC) 150 MG capsule, Take 1 capsule (150 mg total) by mouth 2 (two) times daily., Disp: 180 capsule, Rfl: 3  SENNOSIDES (SENNA LAX ORAL), Take 20 mg by mouth every evening. , Disp: , Rfl:   tiZANidine (ZANAFLEX) 4 MG tablet, , Disp: , Rfl:   traZODone (DESYREL) 100 MG tablet, Take 2 tablets (200 mg total) by mouth every evening., Disp: 60 tablet, Rfl: 3        Review of Systems   Constitutional: Negative.  Negative for chills and fever.        Has lost weight!     HENT: Negative for facial swelling and trouble swallowing.    Eyes: Negative for visual disturbance.   Respiratory: Negative for cough, chest tightness, shortness of breath and wheezing.    Cardiovascular: Positive for leg swelling. Negative for chest pain and palpitations.        Swelling in legs have decreased.     Gastrointestinal: Negative for abdominal pain, constipation, nausea and vomiting.   Genitourinary: Positive for difficulty urinating, dysuria and urgency. Negative for hematuria and vaginal bleeding.   Musculoskeletal: Negative for gait problem.   Skin: Negative for rash.   Neurological: Negative for dizziness and headaches.   Hematological: Does not bruise/bleed easily.   Psychiatric/Behavioral: Negative for behavioral problems. The patient is nervous/anxious.         Concerned for infection.         Objective:      Physical Exam   Nursing note and vitals reviewed.  Constitutional: She is oriented to person, place, and time. Vital signs are normal. She appears well-developed and well-nourished. She is active  and cooperative.   HENT:   Head: Normocephalic.   Right Ear: External ear normal.   Left Ear: External ear normal.   Nose: Nose normal.   Eyes: Conjunctivae and lids are normal. Right eye exhibits no discharge. Left eye exhibits no discharge. No scleral icterus.   Neck: Trachea normal and normal range of motion. Neck supple. No tracheal deviation present.   Cardiovascular: Normal rate, regular rhythm and intact distal pulses.    Pulmonary/Chest: Effort normal. No respiratory distress.   Abdominal: Soft. Bowel sounds are normal. She exhibits no distension and no mass. There is no tenderness. There is no rebound and no guarding.       Neurological: She is alert and oriented to person, place, and time.   Skin: Skin is warm, dry and intact.         Assessment:       1. Neurogenic bladder    2. Chronic suprapubic catheter    3. Urinary retention    4. Urgency of urination    5. Abnormal urine odor        Plan:         I spent 30 minutes with the patient of which more than half was spent in direct consultation with the patient in regards to our treatment and plan.    Education and recommendations of today's plan of care including home remedies.  I discussed the plan of care.  I removed the old SPT; there was some resistance 2/2 to silicone balloon ridge;  There was 25cc in balloon.   I placed a new 20fr Gold silicone SPT easily.  Irrigated to verify the position.   Balloon inflated with desired 30cc; I was still able to irrigate.  We did collected some urine; sediment/hematuria present.  Urine sent to lab for cx.  Will notify of the results when back  Continue good water intake.

## 2018-07-25 LAB
BACTERIA UR CULT: NORMAL
BACTERIA UR CULT: NORMAL

## 2018-07-31 ENCOUNTER — TELEPHONE (OUTPATIENT)
Dept: UROLOGY | Facility: CLINIC | Age: 63
End: 2018-07-31

## 2018-07-31 NOTE — TELEPHONE ENCOUNTER
Pt states she thinks something is wrong with her kidneys. She has slight Right sided flank pain. States her output from sptube is minimal, 100 ml, in bag this morning though she drinks all day. States she does have bladder pressure and odor to urine. Asking if imaging can be done for kidneys.     Last culture resulted muliple organisms from new catheter on 7/24/18.

## 2018-07-31 NOTE — TELEPHONE ENCOUNTER
----- Message from Loren John sent at 7/31/2018  1:52 PM CDT -----  Contact: Self- [phone]  Maria Esther- pt called to speak with staff- states she isn't urinating- has been drinking and nothing is coming out- please contact pt at [phone]

## 2018-08-01 ENCOUNTER — OUTPATIENT CASE MANAGEMENT (OUTPATIENT)
Dept: ADMINISTRATIVE | Facility: OTHER | Age: 63
End: 2018-08-01

## 2018-08-01 ENCOUNTER — TELEPHONE (OUTPATIENT)
Dept: UROLOGY | Facility: CLINIC | Age: 63
End: 2018-08-01

## 2018-08-01 NOTE — PROGRESS NOTES
8/1/18 Summary: Follow-up call with patient's spouse Dangelo. Reports patient remains sleeping.     Interventions: Reviewed UTI teaching with spouse.  Reviewed telephone encounters from urology from yesterday and this am. States patient did speak with NP yesterday. Informed that antibiotics were faxed to [facility]. Requests pharmacy be changed to [facility] as primary - done. States he will pick-up antibiotics from pharmacy in next hour.  Denies falls. Will change to monitoring status. Patient and spouse knowledgeable of signs and symptoms, when to call MD, etc.     Plan:  UTI/Suprapubic Catheter teaching  Lymphedema teaching   Low sodium diet  Fall Prevention in Home  Advanced Directives mailed 6/19  "[employer] Over the Counter Health and Wellness Catalog - mailed 6/19 -DONE receiving benefit 6/27/18  Pill Box mailed 6/19 - received 6/27/18  Refer to [facility] LCSW Vinay Mota to assist with resources - DONE

## 2018-08-02 ENCOUNTER — TELEPHONE (OUTPATIENT)
Dept: UROLOGY | Facility: CLINIC | Age: 63
End: 2018-08-02

## 2018-08-02 NOTE — TELEPHONE ENCOUNTER
Spoke with  nurse.  She reports patient having urinary frequency during day but decreased at night.  We discussed not necessarily infection.  Mrs. Hawley has a Rx for lasix 40mg BID but she taking 80mg during the day.  This explains her LUTS.  She needs to take as directed!

## 2018-08-03 ENCOUNTER — TELEPHONE (OUTPATIENT)
Dept: INTERNAL MEDICINE | Facility: CLINIC | Age: 63
End: 2018-08-03

## 2018-08-03 NOTE — TELEPHONE ENCOUNTER
----- Message from Sampson Bai sent at 8/2/2018  4:48 PM CDT -----  Contact: Patient [phone]  Type: Orders Request    What orders/ testing are being requested? Lab/thyroid check    Is there a future appointment scheduled for the patient with PCP? yes    When? 9/21/18    Comments: would like a call back regarding thyroid checking before scheduling  Contact [phone]    Please call an advise  Thank you

## 2018-08-13 ENCOUNTER — TELEPHONE (OUTPATIENT)
Dept: INTERNAL MEDICINE | Facility: CLINIC | Age: 63
End: 2018-08-13

## 2018-08-20 ENCOUNTER — TELEPHONE (OUTPATIENT)
Dept: UROLOGY | Facility: CLINIC | Age: 63
End: 2018-08-20

## 2018-08-20 NOTE — TELEPHONE ENCOUNTER
Spoke w/ Liz. States pt c/o bladder pressure, foul urine and sediment build up in catheter tubing. Pt denies fevers, chills, hematuria and refuses for catheter to be changed and urine specimen collection for c&s. Advised to irrigate catheter for sediment and have pt increase water intake. Expressed understanding, states she will check back in 24 hours to notify of improvement or worsening of symptoms.

## 2018-08-20 NOTE — TELEPHONE ENCOUNTER
----- Message from Kalina Li sent at 8/20/2018  1:23 PM CDT -----  Contact: Liz mike/ [facility] [phone]  Liz mike/ called in regarding pt above. She states that pt has a catether and have a couple of questions about what she should do.    She states she will be at the pt house for 5 minutes     Contact: Liz/ [phone]

## 2018-08-22 ENCOUNTER — OUTPATIENT CASE MANAGEMENT (OUTPATIENT)
Dept: ADMINISTRATIVE | Facility: OTHER | Age: 63
End: 2018-08-22

## 2018-08-22 NOTE — PROGRESS NOTES
8/22/18 Summary: Follow-up call attempted. No answer.     Interventions: Voice message left requesting call back.     Plan:  UTI/Suprapubic Catheter teaching  Lymphedema teaching   Low sodium diet  Fall Prevention in Home  Advanced Directives mailed 6/19  [employer] Over the Counter Health and Wellness Catalog - mailed 6/19 -DONE receiving benefit 6/27/18  Pill Box mailed 6/19 - received 6/27/18  Refer to [facility] LCSW Vinay Mota to assist with resources - DONE

## 2018-08-29 ENCOUNTER — OUTPATIENT CASE MANAGEMENT (OUTPATIENT)
Dept: ADMINISTRATIVE | Facility: OTHER | Age: 63
End: 2018-08-29

## 2018-08-29 ENCOUNTER — OFFICE VISIT (OUTPATIENT)
Dept: UROLOGY | Facility: CLINIC | Age: 63
End: 2018-08-29
Payer: MEDICARE

## 2018-08-29 VITALS
BODY MASS INDEX: 29.53 KG/M2 | HEART RATE: 54 BPM | DIASTOLIC BLOOD PRESSURE: 54 MMHG | HEIGHT: 64 IN | SYSTOLIC BLOOD PRESSURE: 101 MMHG | WEIGHT: 173 LBS

## 2018-08-29 DIAGNOSIS — Z93.59 CHRONIC SUPRAPUBIC CATHETER: ICD-10-CM

## 2018-08-29 DIAGNOSIS — T83.010A SUPRAPUBIC CATHETER DYSFUNCTION, INITIAL ENCOUNTER: Primary | ICD-10-CM

## 2018-08-29 DIAGNOSIS — N31.9 NEUROGENIC BLADDER: ICD-10-CM

## 2018-08-29 DIAGNOSIS — Z43.5 ENCOUNTER FOR CARE OR REPLACEMENT OF SUPRAPUBIC TUBE: ICD-10-CM

## 2018-08-29 PROCEDURE — 99214 OFFICE O/P EST MOD 30 MIN: CPT | Mod: 25,S$GLB,, | Performed by: NURSE PRACTITIONER

## 2018-08-29 PROCEDURE — 51705 CHANGE OF BLADDER TUBE: CPT | Mod: S$GLB,,, | Performed by: NURSE PRACTITIONER

## 2018-08-29 PROCEDURE — 99499 UNLISTED E&M SERVICE: CPT | Mod: S$GLB,,, | Performed by: NURSE PRACTITIONER

## 2018-08-29 PROCEDURE — 99999 PR PBB SHADOW E&M-EST. PATIENT-LVL V: CPT | Mod: PBBFAC,,, | Performed by: NURSE PRACTITIONER

## 2018-08-29 PROCEDURE — 3008F BODY MASS INDEX DOCD: CPT | Mod: CPTII,S$GLB,, | Performed by: NURSE PRACTITIONER

## 2018-08-29 NOTE — PROGRESS NOTES
Subjective:       Patient ID: Tasha Hawley is a 63 y.o. female.    Chief Complaint: Neurogenic Bladder (chronic SPT)    Tasha Hawley is a 63 y.o. female with neurogenic bladder with suprapubic catheter in place with stress urinary incontinence on previous urodynamics.    03/20/18 Procedure(s) Performed:   1.  Cystoscopy with bladder botox injection 200 U injected  2.  Bladder biopsy and fulguration   3.  Injection of urethral bulking agent-- Macroplastique, 2.5 ml injected into the urethra  4.  Exchange of suprapubic catheter by MD    She was last seen in clinic with Dr. Mayo 05/07/2018  She has been managing her urinary retention with 20fr Gold Silicone catheters changed by [facility].  She was last seen in clinic by me 07/24/2018 for SPT change due to having dysuria, abdominal pain/pressure, foul smelling urine.      She is here today due to problems with her SPT.  It has not been changed by [facility]; the last exchange was with me.  She made appt but did not contact [facility] to exchange it.  Today she reporting SPT getting clogged; excess sediment.   unable to flush.                Past Medical History:  No date: Anticoagulant long-term use  No date: Anxiety  No date: Arthritis  No date: Bilateral lower extremity edema      Comment: severe chronic  No date: Carotid artery occlusion  No date: Cataract  No date: Depression  No date: Fever blister  No date: Hypothyroid  No date: Iron deficiency anemia  No date: Lumbar radiculopathy      Comment: with chronic pain  No date: Ocular migraine  No date: Sleep apnea      Comment: cpap    Past Surgical History:  No date: BACK SURGERY      Comment: x 12  No date: CATARACT EXTRACTION W/  INTRAOCULAR LENS IMPLA* Left      Comment: Dr Coleman   5/23/2018: ESOPHAGOGASTRODUODENOSCOPY N/A      Comment: Procedure: ESOPHAGOGASTRODUODENOSCOPY (EGD);                 Surgeon: Prince Vance MD;  Location: [facility] (4TH FLR);   Service: Endoscopy;                 Laterality: N/A;  r/s 'd per Dr. Vance due                to family emergency- ER  1975: HYSTERECTOMY      Comment: endometriosis  No date: pain pump placement      Comment: SQ Dilaudid Pump managed by Dr. Hillman, Pain                Management  No date: SPINAL CORD STIMULATOR REMOVAL      Comment: before [place]  5-13-13: SPINE SURGERY      Comment: CERVICAL FUSION    Review of patient's family history indicates:  Problem: Cancer      Relation: Mother       Age of Onset: 55       Comment: breast  Problem: Cancer      Relation: Father       Age of Onset: (Not Specified)       Comment: esophagus,had laryngectomy  Problem: Esophageal cancer      Relation: Father       Age of Onset: (Not Specified)   Problem: Parkinsonism      Relation: Maternal Grandmother       Age of Onset: (Not Specified)   Problem: Tremor      Relation: Maternal Grandmother       Age of Onset: (Not Specified)   Problem: No Known Problems      Relation: Brother       Age of Onset: (Not Specified)   Problem: No Known Problems      Relation: Brother       Age of Onset: (Not Specified)   Problem: Heart disease      Relation: Maternal Uncle       Age of Onset: (Not Specified)   Problem: No Known Problems      Relation: Sister       Age of Onset: (Not Specified)   Problem: No Known Problems      Relation: Maternal Aunt       Age of Onset: (Not Specified)   Problem: No Known Problems      Relation: Paternal Aunt       Age of Onset: (Not Specified)   Problem: No Known Problems      Relation: Paternal Uncle       Age of Onset: (Not Specified)   Problem: No Known Problems      Relation: Maternal Grandfather       Age of Onset: (Not Specified)   Problem: No Known Problems      Relation: Paternal Grandmother       Age of Onset: (Not Specified)   Problem: No Known Problems      Relation: Paternal Grandfather       Age of Onset: (Not Specified)   Problem: Melanoma      Relation: Neg Hx       Age of Onset: (Not Specified)    Problem: Amblyopia      Relation: Neg Hx       Age of Onset: (Not Specified)   Problem: Blindness      Relation: Neg Hx       Age of Onset: (Not Specified)   Problem: Cataracts      Relation: Neg Hx       Age of Onset: (Not Specified)   Problem: Diabetes      Relation: Neg Hx       Age of Onset: (Not Specified)   Problem: Glaucoma      Relation: Neg Hx       Age of Onset: (Not Specified)   Problem: Hypertension      Relation: Neg Hx       Age of Onset: (Not Specified)   Problem: Macular degeneration      Relation: Neg Hx       Age of Onset: (Not Specified)   Problem: Retinal detachment      Relation: Neg Hx       Age of Onset: (Not Specified)   Problem: Strabismus      Relation: Neg Hx       Age of Onset: (Not Specified)   Problem: Stroke      Relation: Neg Hx       Age of Onset: (Not Specified)   Problem: Thyroid disease      Relation: Neg Hx       Age of Onset: (Not Specified)   Problem: Colon cancer      Relation: Neg Hx       Age of Onset: (Not Specified)       Social History    Marital status:              Spouse name:                       Years of education:                 Number of children:               Occupational History    None on file    Social History Main Topics    Smoking status: Never Smoker                                                                Smokeless tobacco: Never Used                        Alcohol use: No                 Comment: denies    Drug use: No              Sexual activity: No                   Other Topics            Concern    None on file    Social History Narrative    None on file        Allergies:  (d)-limonene flavor; Bactrim (sulfamethoxazole-trimethoprim); Benadryl (diphenhydramine hcl); Imitrex (sumatriptan succinate); Penicillins; Topamax (topiramate); Vancomycin; Butorphanol tartrate; Darvocet a500 (propoxyphene n-acetaminophen); Fentanyl; White petrolatum-zinc; Zinc oxide-white petrolatum; and Latex, natural rubber    Medications:  Current Outpatient  Prescriptions:   aspirin (ECOTRIN) 81 MG EC tablet, Take 1 tablet (81 mg total) by mouth once daily., Disp: , Rfl: 0  atorvastatin (LIPITOR) 80 MG tablet, TAKE ONE TABLET BY MOUTH EVERY DAY. (Patient taking differently: TAKE ONE TABLET BY MOUTH Nightly), Disp: 90 tablet, Rfl: 3  buPROPion (WELLBUTRIN XL) 300 MG 24 hr tablet, Take 1 tablet (300 mg total) by mouth once daily., Disp: 30 tablet, Rfl: 3  butalbital-aspirin-caffeine -40 mg (FIORINAL) -40 mg Cap, Take 1 capsule by mouth 3 (three) times daily as needed., Disp: , Rfl:   clopidogrel (PLAVIX) 75 mg tablet, Take 75 mg by mouth once daily., Disp: , Rfl:   DOC-Q-LACE 100 mg capsule, TAKE ONE CAPSULE BY MOUTH THREE TIMES DAILY AS NEEDED FOR CONSTIPATION, Disp: 180 capsule, Rfl: 3  docosanol (ABREVA) 10 % Crea, Apply 1 application topically 2 (two) times daily., Disp: 2 g, Rfl: 0  FLUoxetine (PROZAC) 20 MG capsule, Take 3 capsules (60 mg total) by mouth once daily., Disp: 90 capsule, Rfl: 3  furosemide (LASIX) 40 MG tablet, Take 1 tablet (40 mg total) by mouth 2 (two) times daily., Disp: 60 tablet, Rfl: 11  hydrocodone-acetaminophen 10-325mg (NORCO)  mg Tab, Take 2 tablets by mouth every 4 (four) hours as needed for Pain. , Disp: , Rfl:   Lactobacillus acidophilus (PROBIOTIC) 10 billion cell Cap, Take 1 capsule by mouth once daily., Disp: , Rfl:   levothyroxine (SYNTHROID) 125 MCG tablet, Take 1 tablet (125 mcg total) by mouth before breakfast., Disp: 90 tablet, Rfl: 3  ondansetron (ZOFRAN) 8 MG tablet, TAKE ONE TABLET BY MOUTH EVERY TWELVE HOURS AS NEEDED FOR NAUSEA, Disp: 60 tablet, Rfl: 2  ondansetron (ZOFRAN-ODT) 4 MG TbDL, Take 1 tablet (4 mg total) by mouth every 8 (eight) hours as needed., Disp: 30 tablet, Rfl: 0  oxybutynin (DITROPAN XL) 15 MG TR24, Take 5 mg by mouth once daily., Disp: , Rfl:   pantoprazole (PROTONIX) 40 MG tablet, Take 1 tablet (40 mg total) by mouth once daily., Disp: 90 tablet, Rfl: 3  potassium  chloride (KLOR-CON) 10 MEQ TbSR, , Disp: , Rfl:   potassium chloride SA (K-DUR,KLOR-CON) 20 MEQ tablet, TAKE THREE TABLETS BY MOUTH DAILY (Patient taking differently: TAKE Two TABLETS BY MOUTH DAILY), Disp: 270 tablet, Rfl: 3  QUEtiapine (SEROQUEL) 100 MG Tab, Take 1 tablet (100 mg total) by mouth every evening., Disp: 30 tablet, Rfl: 3  ranitidine (ZANTAC) 150 MG capsule, Take 1 capsule (150 mg total) by mouth 2 (two) times daily., Disp: 180 capsule, Rfl: 3  SENNOSIDES (SENNA LAX ORAL), Take 20 mg by mouth every evening. , Disp: , Rfl:   tiZANidine (ZANAFLEX) 4 MG tablet, , Disp: , Rfl:   traZODone (DESYREL) 100 MG tablet, Take 2 tablets (200 mg total) by mouth every evening., Disp: 60 tablet, Rfl: 3        Review of Systems   Constitutional: Negative for chills and fever.   HENT: Negative for facial swelling and trouble swallowing.    Eyes: Negative for visual disturbance.   Respiratory: Negative for cough, chest tightness, shortness of breath and wheezing.    Cardiovascular: Negative for chest pain and palpitations.   Gastrointestinal: Negative for abdominal pain, constipation, nausea and vomiting.   Genitourinary: Positive for difficulty urinating. Negative for dysuria, hematuria, urgency and vaginal bleeding.   Musculoskeletal: Positive for arthralgias and gait problem.   Skin: Negative for rash.   Neurological: Positive for weakness. Negative for dizziness and headaches.   Hematological: Does not bruise/bleed easily.   Psychiatric/Behavioral: Negative for behavioral problems.       Objective:      Physical Exam   Nursing note and vitals reviewed.  Constitutional: She is oriented to person, place, and time. She appears well-developed and well-nourished.   HENT:   Head: Normocephalic.   Right Ear: External ear normal.   Left Ear: External ear normal.   Nose: Nose normal.   Eyes: Conjunctivae and lids are normal. Right eye exhibits no discharge. Left eye exhibits no discharge. No scleral icterus.   Neck:  Trachea normal and normal range of motion. Neck supple. No tracheal deviation present.   Cardiovascular: Normal rate, regular rhythm and intact distal pulses.    Pulmonary/Chest: Effort normal. No respiratory distress.   Abdominal: Soft. Normal appearance and bowel sounds are normal. She exhibits no distension and no mass. There is no tenderness. There is no rebound and no guarding.       Musculoskeletal: Normal range of motion. She exhibits no edema.   Neurological: She is alert and oriented to person, place, and time.   Skin: Skin is warm, dry and intact.     Psychiatric: She has a normal mood and affect. Her behavior is normal. Judgment and thought content normal.       Assessment:       1. Suprapubic catheter dysfunction, initial encounter    2. Neurogenic bladder    3. Chronic suprapubic catheter    4. Encounter for care or replacement of suprapubic tube        Plan:         I spent 25 minutes with the patient of which more than half was spent in direct consultation with the patient in regards to our treatment and plan.    Education and recommendations of today's plan of care including home remedies.  We discussed the contributing factors for her SPT issues.  Increase water intake; adding lemon juice to water help urine pH.  I removed old SPT (20cc in balloon). Some resistance noted with removal.  I then place a new 20fr Gold Silicone SPT using sterile technique.  Irrigated well to verify the position; Balloon inflated with 15 cc sterile water.  Tolerated well.  We discussed no need to over inflate balloon; can worsen LUTS  Good water intake  Reassurance no infection noted.  Have [facility] change [place] in one month.

## 2018-08-29 NOTE — PROGRESS NOTES
8/29/18 Summary: Patient in Urology appt.    Interventions: Will follow-up next week.     Plan:  UTI/Suprapubic Catheter teaching  Lymphedema teaching   Low sodium diet  Fall Prevention in Home  Advanced Directives mailed 6/19  [employer] Over [employer] Counter [employer] and Wellness Catalog - mailed 6/19 -DONE receiving benefit 6/27/18  Pill Box mailed 6/19 - received 6/27/18  Refer to [facility] LCSW Vinay Mota to assist with resources - DONE

## 2018-08-31 ENCOUNTER — PES CALL (OUTPATIENT)
Dept: ADMINISTRATIVE | Facility: CLINIC | Age: 63
End: 2018-08-31

## 2018-09-04 ENCOUNTER — OUTPATIENT CASE MANAGEMENT (OUTPATIENT)
Dept: ADMINISTRATIVE | Facility: OTHER | Age: 63
End: 2018-09-04

## 2018-09-04 NOTE — PROGRESS NOTES
9/4/18 Summary: Follow-up call with patient spouse. Reports patient is sleeping at this time. States catheter was changed by provider on 8/29 and was leaking, had to call home health nurse who inflated balloon to 300 ml as per catheter's instructions. Reports has not had any problems, denies pain, denies falls. Patient has been drinking lemon water as instructed to prevent calcium build up.     Interventions:   [x] Called and reviewed disaster plan with patient/caregiver.  Provided emergency phone number for patient's [place].   [] Attempted to reach patient/caregiver to review disaster plan.  Left a voice message with the phone number for patient's [place] Office of Emergency Preparedness.     Reviewed with Patient/Caregiver:  [x] Patient to have a supply of water, non-perishable food items, flashlights and batteries  [x] Patient to bring all medications if evacuates:  at least a five day supply preferably in the medication bottles, in case displaced for longer than 5 days  [x] Patient to bring any DME needed (walkers, wheelchairs, shower chairs, nebulizer machine, etc.)   [] If Diabetic, patient to bring glucometer and monitoring supplies.   [] If Hypertension, patient to bring blood pressure cuff  [] If CHF, patient to bring scale  [] If tube feeds, patient to bring tube feedings and feeding supplies.  [] If on oxygen,  patient to bring all oxygen supplies (Cannula, portable tanks, concentrator).  Patient will contact oxygen supply company to find out the nearest location of a supply company near evacuated location. ([employer]: [phone], [facility]: [phone], [facility]:[phone], [employer]: [phone]), [facility] [phone].  [] If on hemodialysis, patient should already have a plan in place with dialysis center. If patient does not know where to evacuate to in order to be close to a dialysis center, patient/caregiver to reach out to regular dialysis center for further direction.  ([place] [facility] service line: [phone], [place] [facility] service line [phone])  [] If receiving treatment at an infusion center, patient/caregiver to contact the infusion center to find out which infusion center they have a contract with where patient plans to evacuate.      Office of Emergency Preparedness Phone number:  [] [facility]: [phone]  [] [facility]: [phone]  [] [facility]: [phone]  [x] [place]: [phone]  [] [facility]: [phone]  [] [facility]: [phone]  [] [facility]: [phone]  [] [facility]: [phone]  [] [facility]: [phone]  [] [facility]: [phone]  [] [facility]: [phone]  [] [facility] [place]: [phone]  [] [facility]: [phone]    [] [facility]:  [phone]   [] [facility]:  [phone]   [] [facility]:  [phone]   [] [facility]:  [phone]   [] [facility]:  [phone]   [] [facility]: [phone]   [] [facility]:  [phone]   [] [facility]:  [phone]   [] [facility]:  [phone]   [] [facility]:  [phone]   [] [facility]:  [phone]   [] [facility]:  [phone]   [] [facility]:  [phone]   [] [facility]:  [phone]   [] [facility]:  [phone]   [] [facility]: [phone]   [] [facility]:  [phone]   [] [place]: [phone]   [] [facility]: [phone]    Reviewed Urology office visit. Spouse verbalizes understanding. Able to verbalize S&S UTI, lymphedema and low sodium diet. Case will be closed as patient, spouse and HH managing health issues. Provided On Call 24 hr nurse # if needs arise.     Plan:  Case closed

## 2018-09-05 ENCOUNTER — TELEPHONE (OUTPATIENT)
Dept: UROLOGY | Facility: CLINIC | Age: 63
End: 2018-09-05

## 2018-09-05 NOTE — TELEPHONE ENCOUNTER
----- Message from Karoline Land MA sent at 9/5/2018  3:00 PM CDT -----  Contact: [phone]  ext [phone]  Karine with [facility]     Needs Advice    Reason for call:  Pt called her home health agency c/o blood in urine, bladder spasms and leg and back pain      Communication Preference: [phone]  ext [phone]  Karine with [facility]    Additional Information: they went out Sunday to see the pt.

## 2018-09-05 NOTE — TELEPHONE ENCOUNTER
Spoke w/ Karine, gave verbal order to collect urine specimen from new catheter and send for culture.

## 2018-09-06 ENCOUNTER — TELEPHONE (OUTPATIENT)
Dept: UROLOGY | Facility: CLINIC | Age: 63
End: 2018-09-06

## 2018-09-06 NOTE — TELEPHONE ENCOUNTER
----- Message from Karoline Land MA sent at 9/6/2018  1:49 PM CDT -----  Contact: [phone]  Needs Advice    Reason for call: pt said that [facility] offered her an appt for next Friday and she would like to take that offer     Communication Preference: [phone]

## 2018-09-06 NOTE — TELEPHONE ENCOUNTER
Spoke w/ Liz ([facility]  nurse), states she went to pt's home and pt shows no s/s of infection. States pt's urine is clear, yellow and her only complaint is bladder pressure. Pt has refused for specimen to be collected for culture. She decreased amount of sterile water in 10 mL balloon from 30 mL to 10 mL and will check back with pt on Monday to see if she still has c/o bladder pressure.

## 2018-09-06 NOTE — TELEPHONE ENCOUNTER
----- Message from Kalina Cohn MA sent at 9/6/2018 12:57 PM CDT -----  Contact: Liz mike/[facility]    Needs Advice    Reason for call:    Catheter issues and I need to speak to you regarding what is going on.    Communication Preference:  Phone# above  Additional Information:  I am going to her in one hour and she is stating she needs labs and a catheter change.  I need advice before going to her house.

## 2018-09-14 ENCOUNTER — OFFICE VISIT (OUTPATIENT)
Dept: UROLOGY | Facility: CLINIC | Age: 63
End: 2018-09-14
Payer: MEDICARE

## 2018-09-14 VITALS
HEIGHT: 64 IN | WEIGHT: 173 LBS | BODY MASS INDEX: 29.53 KG/M2 | DIASTOLIC BLOOD PRESSURE: 44 MMHG | SYSTOLIC BLOOD PRESSURE: 95 MMHG | HEART RATE: 59 BPM

## 2018-09-14 DIAGNOSIS — Z93.59 SUPRAPUBIC CATHETER: Chronic | ICD-10-CM

## 2018-09-14 DIAGNOSIS — N39.0 RECURRENT UTI (URINARY TRACT INFECTION): ICD-10-CM

## 2018-09-14 DIAGNOSIS — N39.0 RECURRENT UTI: Primary | ICD-10-CM

## 2018-09-14 PROCEDURE — 99999 PR PBB SHADOW E&M-EST. PATIENT-LVL V: CPT | Mod: PBBFAC,,,

## 2018-09-14 PROCEDURE — 3008F BODY MASS INDEX DOCD: CPT | Mod: CPTII,,, | Performed by: UROLOGY

## 2018-09-14 PROCEDURE — 99213 OFFICE O/P EST LOW 20 MIN: CPT | Mod: S$PBB,,, | Performed by: UROLOGY

## 2018-09-14 PROCEDURE — 99215 OFFICE O/P EST HI 40 MIN: CPT | Mod: PBBFAC

## 2018-09-14 RX ORDER — POTASSIUM CITRATE 10 MEQ/1
10 TABLET, EXTENDED RELEASE ORAL
Qty: 90 TABLET | Refills: 11 | Status: SHIPPED | OUTPATIENT
Start: 2018-09-14 | End: 2019-12-12 | Stop reason: SDUPTHER

## 2018-09-14 NOTE — PROGRESS NOTES
Urology - [facility]  Resident Clinic  Staff - Shireen Mayo MD    SUBJECTIVE:     Chief Complaint: issues with suprapubic catheter    History of Present Illness:  Tasha Hawley is a 63 y.o. female who presents to clinic to discuss her suprapubic catheter.     She had SUDS in 2016 which showed decreased sensation, urodynamic stress urinary incontinence, large bladder capacity, incomplete emptying. PVR was 650. She was managed with an indwelling catheter and then requested an SPT which was ultimately placed in 11/2016. She has been managing her bladder with the SPT since that time and has been seen multiple times for issues with drainage, sediment in the urine, and recurrent UTIs. She presents today with symptoms of sediment and issues draining. No recent fevers or chills. She is requesting other options for managing her bladder.    Review of patient's allergies indicates:   Allergen Reactions    (d)-limonene flavor      Other reaction(s): difficult intubation  Other reaction(s): Difficulty breathing    Bactrim [sulfamethoxazole-trimethoprim] Anaphylaxis    Benadryl [diphenhydramine hcl] Shortness Of Breath    Imitrex [sumatriptan succinate] Shortness Of Breath    Penicillins Shortness Of Breath     Other reaction(s): Jittery    Topamax [topiramate] Shortness Of Breath    Vancomycin Shortness Of Breath     Rash    Butorphanol tartrate     Darvocet a500 [propoxyphene n-acetaminophen]      Other reaction(s): Difficulty breathing    Fentanyl      Other reaction(s): Vomiting  Other reaction(s): Nausea  Other reaction(s): Itching  swelling    White petrolatum-zinc     Zinc oxide-white petrolatum      Other reaction(s): Difficulty breathing    Latex, natural rubber Itching and Rash       Past Medical History:   Diagnosis Date    Anticoagulant long-term use     Anxiety     Arthritis     Bilateral lower extremity edema     severe chronic    Carotid artery occlusion     Cataract      Depression     Fever blister     Hypothyroid     Iron deficiency anemia     Lumbar radiculopathy     with chronic pain    Ocular migraine     Sleep apnea     cpap     Past Surgical History:   Procedure Laterality Date    BACK SURGERY      x 12    BIOPSY-BLADDER N/A 3/20/2018    Performed by Sh[facility]en [facility] MD at [facility] OR 1ST FLR    BIOPSY-BLADDER N/A 1/26/2017    Performed by Sh[facility]en Mayo MD at [facility] OR 1ST FLR    CATARACT EXTRACTION W/  INTRAOCULAR LENS IMPLANT Left     Dr Coleman     CYSTOSCOPY N/A 3/20/2018    Performed by Shireen Mayo MD at [facility] OR 1ST FLR    CYSTOSCOPY N/A 1/26/2017    Performed by Shireen Mayo MD at [facility] OR 1ST FLR    CYSTOSCOPY N/A 11/8/2016    Performed by Sh[facility]en Mayo MD at [facility] OR 2ND FLR    DISCECTOMY, SPINE, CERVICAL, ANTERIOR APPROACH, WITH FUSION N/A 5/13/2013    Performed by Larry Martinez MD at [facility] OR 2ND FLR    ESOPHAGOGASTRODUODENOSCOPY N/A 5/23/2018    Procedure: ESOPHAGOGASTRODUODENOSCOPY (EGD);  Surgeon: Prince Vance MD;  [facility]: [facility] ([facility]);  Service: Endoscopy;  Laterality: N/A;  r/s 'd per Dr. Vance due to family emergency- ER    ESOPHAGOGASTRODUODENOSCOPY (EGD) N/A 5/23/2018    Performed by Prince Vance MD at [facility] ENDO (4TH FLR)    ESOPHAGOGASTRODUODENOSCOPY (EGD) N/A 7/21/2017    Performed by Prince Vance MD at [facility] ENDO (4TH FLR)    ESOPHAGOGASTRODUODENOSCOPY (EGD) w/ dilation N/A 8/28/2017    Performed by Prince Vance MD at [facility] ENDO (4TH FLR)    FULGURATION-BLADDER N/A 1/26/2017    Performed by Shireen Mayo MD at [facility] OR 1ST FLR    HEART CATH-LEFT Left 1/7/2016    Performed by Alberto Gee MD at [facility] CATH LAB    HYSTERECTOMY  1975    endometriosis    INJECTION-BOTOX N/A 3/20/2018    Performed by Shireen Mayo MD at [facility] OR 1ST FLR    INJECTION-BOTOX N/A 1/26/2017    Performed by Shireen Mayo MD at [facility] OR 1ST FLR    INJECTION-BULKING AGENT URETHRAL  OUTLET N/A 3/20/2018     Performed by Shireen Mayo MD at [facility] OR 1ST FLR    INSERTION-INTRAOCULAR LENS (IOL) Left 11/13/2014    Performed by Sanjana Coleman MD at [facility] OR 1ST FLR    INSERTION-SUPRAPUBIC TUBE-OPEN N/A 11/8/2016    Performed by Shireen Mayo MD at [facility] OR 2ND FLR    MANOMETRY-ESOPHAGEAL-WITH IMPEDANCE N/A 4/23/2018    Performed by Robert Menendez MD at [facility] ENDO (4TH FLR)    MYELOGRAM N/A 2/19/2013    Performed by [facility] at [facility] OR 2ND FLR    pain pump placement      SQ Dilaudid Pump managed by Dr. Hillman, Pain Management    PHACOEMULSIFICATION-ASPIRATION-CATARACT Left 11/13/2014    Performed by Sanjana Coleman MD at [facility] OR 1ST FLR    ID UPPER GI ENDOSCOPY,DIAGNOSIS [27803 (CPT®)] N/A 7/16/2014    Performed by Prince Vance MD at [facility] ENDO (4TH FLR)    SPINAL CORD STIMULATOR REMOVAL      before Larissa    SPINE SURGERY  5-13-13    CERVICAL FUSION     Family History   Problem Relation Age of Onset    Cancer Mother 55        breast    Cancer Father         esophagus,had laryngectomy    Esophageal cancer Father     Parkinsonism Maternal Grandmother     Tremor Maternal Grandmother     No Known Problems Brother     No Known Problems Brother     Heart disease Maternal Uncle     No Known Problems Sister     No Known Problems Maternal Aunt     No Known Problems Paternal Aunt     No Known Problems Paternal Uncle     No Known Problems Maternal Grandfather     No Known Problems Paternal Grandmother     No Known Problems Paternal Grandfather      Social History     Tobacco Use    Smoking status: Never Smoker    Smokeless tobacco: Never Used   Substance Use Topics    Alcohol use: No     Comment: denies    Drug use: No        Review of Systems   Constitutional: Negative for chills and fever.   HENT: Negative for sore throat and trouble swallowing.    Eyes: Negative for pain and discharge.   Respiratory: Negative for shortness of breath and wheezing.    Cardiovascular: Negative  "for chest pain and palpitations.   Gastrointestinal: Negative for abdominal pain, diarrhea, nausea and vomiting.   Genitourinary:        As per HPI   Musculoskeletal: Negative for back pain and joint swelling.   Skin: Negative for rash and wound.   Neurological: Negative for seizures, weakness and headaches.   Psychiatric/Behavioral: Negative for hallucinations. The patient is not nervous/anxious.        OBJECTIVE:     Anticoagulation:  No    Estimated body mass index is 29.7 kg/m² as calculated from the following:    Height as of this encounter: 5' 4" (1.626 m).    Weight as of this encounter: 78.5 kg (173 lb).    Vital Signs (Most Recent)  Pulse: (!) 59 (09/14/18 1338)  BP: (!) 95/44 (09/14/18 1338)    Physical Exam   Nursing note and vitals reviewed.  Constitutional: She is oriented to person, place, and time. Vital signs are normal. She appears well-developed and well-nourished. No distress.   Neck: Trachea normal. Neck supple. No tracheal deviation present.   Cardiovascular: Normal rate and intact distal pulses.    Pulmonary/Chest: Effort normal. No accessory muscle usage. No respiratory distress.   Abdominal: Soft. She exhibits no distension. There is no tenderness. There is no CVA tenderness. No hernia.   There is no spleen or liver enlargement  Suprapubic catheter in place with sediment   Musculoskeletal: She exhibits no edema or deformity.   Lymphadenopathy:     She has no cervical adenopathy.        Right: No inguinal adenopathy present.        Left: No inguinal adenopathy present.   Neurological: She is alert and oriented to person, place, and time.   Skin: Skin is warm and dry. No lesion and no rash noted. No cyanosis.     Psychiatric: She has a normal mood and affect. Judgment normal.     BMP  Lab Results   Component Value Date     06/12/2018    K 4.0 06/12/2018     06/12/2018    CO2 27 06/12/2018    BUN 7 (L) 06/12/2018    CREATININE 0.8 06/12/2018    CALCIUM 9.4 06/12/2018    ANIONGAP 10 " 06/12/2018    ESTGFRAFRICA >60 06/12/2018    EGFRNONAA >60 06/12/2018     Lab Results   Component Value Date    WBC 4.52 06/12/2018    HGB 11.1 (L) 06/12/2018    HCT 35.3 (L) 06/12/2018    MCV 85 06/12/2018     06/12/2018     ASSESSMENT     1. Recurrent UTI    2. Recurrent UTI (urinary tract infection)    3. Suprapubic catheter        PLAN:     - Discussed options and advised patient to continue with suprapubic tube. She will have it changed in 2 weeks with home health.  - Will change her potassium chloride supplement to potassium citrate 10 meq TID    Nigel Garces MD

## 2018-09-14 NOTE — PATIENT INSTRUCTIONS
Discontinue the Klor Con or potassium chloride.    Start Potassium citrate take three times a day  Take it with vitamin C 250 mg twice a day.

## 2018-09-21 ENCOUNTER — OFFICE VISIT (OUTPATIENT)
Dept: UROLOGY | Facility: CLINIC | Age: 63
End: 2018-09-21
Payer: MEDICARE

## 2018-09-21 ENCOUNTER — LAB VISIT (OUTPATIENT)
Dept: LAB | Facility: HOSPITAL | Age: 63
End: 2018-09-21
Attending: INTERNAL MEDICINE
Payer: MEDICARE

## 2018-09-21 ENCOUNTER — OFFICE VISIT (OUTPATIENT)
Dept: INTERNAL MEDICINE | Facility: CLINIC | Age: 63
End: 2018-09-21
Payer: MEDICARE

## 2018-09-21 VITALS — WEIGHT: 175.25 LBS | BODY MASS INDEX: 30.08 KG/M2

## 2018-09-21 VITALS
OXYGEN SATURATION: 98 % | BODY MASS INDEX: 29.92 KG/M2 | HEART RATE: 62 BPM | WEIGHT: 175.25 LBS | HEIGHT: 64 IN | DIASTOLIC BLOOD PRESSURE: 80 MMHG | SYSTOLIC BLOOD PRESSURE: 150 MMHG

## 2018-09-21 DIAGNOSIS — G89.29 CHRONIC NONINTRACTABLE HEADACHE, UNSPECIFIED HEADACHE TYPE: ICD-10-CM

## 2018-09-21 DIAGNOSIS — R51.9 CHRONIC NONINTRACTABLE HEADACHE, UNSPECIFIED HEADACHE TYPE: ICD-10-CM

## 2018-09-21 DIAGNOSIS — I25.10 CORONARY ARTERY DISEASE INVOLVING NATIVE CORONARY ARTERY OF NATIVE HEART WITHOUT ANGINA PECTORIS: Chronic | ICD-10-CM

## 2018-09-21 DIAGNOSIS — Z23 NEEDS FLU SHOT: ICD-10-CM

## 2018-09-21 DIAGNOSIS — N31.9 NEUROGENIC BLADDER: Primary | Chronic | ICD-10-CM

## 2018-09-21 DIAGNOSIS — F11.20 NARCOTIC DEPENDENCY, CONTINUOUS: Chronic | ICD-10-CM

## 2018-09-21 DIAGNOSIS — Z23 NEED FOR SHINGLES VACCINE: ICD-10-CM

## 2018-09-21 DIAGNOSIS — Z43.5 ENCOUNTER FOR CARE OR REPLACEMENT OF SUPRAPUBIC TUBE: Primary | ICD-10-CM

## 2018-09-21 DIAGNOSIS — G47.01 INSOMNIA DUE TO MEDICAL CONDITION: Chronic | ICD-10-CM

## 2018-09-21 DIAGNOSIS — T83.010D SUPRAPUBIC CATHETER DYSFUNCTION, SUBSEQUENT ENCOUNTER: ICD-10-CM

## 2018-09-21 LAB
ALBUMIN SERPL BCP-MCNC: 3.9 G/DL
ALP SERPL-CCNC: 107 U/L
ALT SERPL W/O P-5'-P-CCNC: 24 U/L
ANION GAP SERPL CALC-SCNC: 9 MMOL/L
AST SERPL-CCNC: 22 U/L
BILIRUB SERPL-MCNC: 0.7 MG/DL
BUN SERPL-MCNC: 8 MG/DL
CALCIUM SERPL-MCNC: 9.4 MG/DL
CHLORIDE SERPL-SCNC: 103 MMOL/L
CHOLEST SERPL-MCNC: 131 MG/DL
CHOLEST/HDLC SERPL: 3 {RATIO}
CO2 SERPL-SCNC: 28 MMOL/L
CREAT SERPL-MCNC: 0.8 MG/DL
EST. GFR  (AFRICAN AMERICAN): >60 ML/MIN/1.73 M^2
EST. GFR  (NON AFRICAN AMERICAN): >60 ML/MIN/1.73 M^2
GLUCOSE SERPL-MCNC: 96 MG/DL
HDLC SERPL-MCNC: 43 MG/DL
HDLC SERPL: 32.8 %
LDLC SERPL CALC-MCNC: 74.4 MG/DL
NONHDLC SERPL-MCNC: 88 MG/DL
POTASSIUM SERPL-SCNC: 3.7 MMOL/L
PROT SERPL-MCNC: 7.3 G/DL
SODIUM SERPL-SCNC: 140 MMOL/L
TRIGL SERPL-MCNC: 68 MG/DL

## 2018-09-21 PROCEDURE — 3008F BODY MASS INDEX DOCD: CPT | Mod: CPTII,,, | Performed by: INTERNAL MEDICINE

## 2018-09-21 PROCEDURE — 99213 OFFICE O/P EST LOW 20 MIN: CPT | Mod: PBBFAC,27 | Performed by: INTERNAL MEDICINE

## 2018-09-21 PROCEDURE — 99212 OFFICE O/P EST SF 10 MIN: CPT | Mod: S$PBB,,, | Performed by: UROLOGY

## 2018-09-21 PROCEDURE — 3008F BODY MASS INDEX DOCD: CPT | Mod: CPTII,,, | Performed by: UROLOGY

## 2018-09-21 PROCEDURE — 99213 OFFICE O/P EST LOW 20 MIN: CPT | Mod: PBBFAC

## 2018-09-21 PROCEDURE — 99214 OFFICE O/P EST MOD 30 MIN: CPT | Mod: S$PBB,,, | Performed by: INTERNAL MEDICINE

## 2018-09-21 PROCEDURE — 36415 COLL VENOUS BLD VENIPUNCTURE: CPT

## 2018-09-21 PROCEDURE — 80053 COMPREHEN METABOLIC PANEL: CPT

## 2018-09-21 PROCEDURE — 80061 LIPID PANEL: CPT

## 2018-09-21 PROCEDURE — 99999 PR PBB SHADOW E&M-EST. PATIENT-LVL III: CPT | Mod: PBBFAC,,,

## 2018-09-21 PROCEDURE — 99999 PR PBB SHADOW E&M-EST. PATIENT-LVL III: CPT | Mod: PBBFAC,,, | Performed by: INTERNAL MEDICINE

## 2018-09-21 NOTE — PROGRESS NOTES
Urology - [facility]  Resident Clinic  Staff - Shireen Mayo MD    SUBJECTIVE:     Chief Complaint: clogged suprapubic catheter    History of Present Illness:  Tasha Hawley is a 63 y.o. female who presents to clinic to due to clogged suprapubic catheter.     She had SUDS in 2016 which showed decreased sensation, urodynamic stress urinary incontinence, large bladder capacity, incomplete emptying. PVR was 650. She was managed with an indwelling catheter and then requested an SPT which was ultimately placed in 11/2016. She has been managing her bladder with the SPT since that time and has been seen multiple times for issues with drainage, sediment in the urine, and recurrent UTIs.     She presents today with symptoms of sediment and clogged catheter and she has had drainage per her urethra. No recent fevers or chills.    Review of patient's allergies indicates:   Allergen Reactions    (d)-limonene flavor      Other reaction(s): difficult intubation  Other reaction(s): Difficulty breathing    Bactrim [sulfamethoxazole-trimethoprim] Anaphylaxis    Benadryl [diphenhydramine hcl] Shortness Of Breath    Imitrex [sumatriptan succinate] Shortness Of Breath    Penicillins Shortness Of Breath     Other reaction(s): Jittery    Topamax [topiramate] Shortness Of Breath    Vancomycin Shortness Of Breath     Rash    Butorphanol tartrate     Darvocet a500 [propoxyphene n-acetaminophen]      Other reaction(s): Difficulty breathing    Fentanyl      Other reaction(s): Vomiting  Other reaction(s): Nausea  Other reaction(s): Itching  swelling    White petrolatum-zinc     Zinc oxide-white petrolatum      Other reaction(s): Difficulty breathing    Latex, natural rubber Itching and Rash       Past Medical History:   Diagnosis Date    Anticoagulant long-term use     Anxiety     Arthritis     Bilateral lower extremity edema     severe chronic    Carotid artery occlusion     Cataract     Depression     Fever  blister     Hypothyroid     Iron deficiency anemia     Lumbar radiculopathy     with chronic pain    Ocular migraine     Sleep apnea     cpap     Past Surgical History:   Procedure Laterality Date    BACK SURGERY      x 12    BIOPSY-BLADDER N/A 3/20/2018    Performed by [facility] at [facility] OR 1ST FLR    BIOPSY-BLADDER N/A 1/26/2017    Performed by Shireen Mayo MD at [facility] OR 1ST FLR    CATARACT EXTRACTION W/  INTRAOCULAR LENS IMPLANT Left     Dr Coleman     CYSTOSCOPY N/A 3/20/2018    Performed by Shireen Mayo MD at [facility] OR 1ST FLR    CYSTOSCOPY N/A 1/26/2017    Performed by Shireen Mayo MD at [facility] OR 1ST FLR    CYSTOSCOPY N/A 11/8/2016    Performed by Shireen Mayo MD at [facility] OR 2ND FLR    DISCECTOMY, SPINE, CERVICAL, ANTERIOR APPROACH, WITH FUSION N/A 5/13/2013    Performed by Larry Martinez MD at [facility] OR 2ND FLR    ESOPHAGOGASTRODUODENOSCOPY N/A 5/23/2018    Procedure: ESOPHAGOGASTRODUODENOSCOPY (EGD);  Surgeon: Prince Vance MD;  Location: [facility] (4TH FLR);  Service: Endoscopy;  Laterality: N/A;  r/s 'd per Dr. Vance due to family emergency- ER    ESOPHAGOGASTRODUODENOSCOPY (EGD) N/A 5/23/2018    Performed by Prince Vance MD at [facility] ENDO (4TH FLR)    ESOPHAGOGASTRODUODENOSCOPY (EGD) N/A 7/21/2017    Performed by Prince Vance MD at [facility] ENDO (4TH FLR)    ESOPHAGOGASTRODUODENOSCOPY (EGD) w/ dilation N/A 8/28/2017    Performed by Prince Vance MD at [facility] ENDO (4TH FLR)    FULGURATION-BLADDER N/A 1/26/2017    Performed by Shireen Mayo MD at [facility] OR 1ST FLR    HEART CATH-LEFT Left 1/7/2016    Performed by Alberto Gee MD at [facility] CATH LAB    HYSTERECTOMY  1975    endometriosis    INJECTION-BOTOX N/A 3/20/2018    Performed by Shireen Mayo MD at [facility] OR 1ST FLR    INJECTION-BOTOX N/A 1/26/2017    Performed by Shireen Mayo MD at [facility] OR 1ST FLR    INJECTION-BULKING AGENT URETHRAL  OUTLET N/A 3/20/2018    Performed by Shireen PICKETT  MD [facility] OR 1ST FLR    INSERTION-INTRAOCULAR LENS (IOL) Left 11/13/2014    Performed by Sanjana Coleman MD at [facility] OR 1ST FLR    INSERTION-SUPRAPUBIC TUBE-OPEN N/A 11/8/2016    Performed by Shireen Mayo MD at [facility] OR 2ND FLR    MANOMETRY-ESOPHAGEAL-WITH IMPEDANCE N/A 4/23/2018    Performed by Robert Menendez MD at [facility] ENDO (4TH FLR)    MYELOGRAM N/A 2/19/2013    Performed by [facility] Diagnostic Provider at [facility] OR 2ND FLR    pain pump placement      SQ Dilaudid Pump managed by Dr. Hillman, Pain Management    PHACOEMULSIFICATION-ASPIRATION-CATARACT Left 11/13/2014    Performed by Sanjana Coleman MD at [facility] OR 1ST FLR    CA UPPER GI ENDOSCOPY,DIAGNOSIS [58231 (CPT®)] N/A 7/16/2014    Performed by Prince Vance MD at [facility] ENDO (4TH FLR)    SPINAL CORD STIMULATOR REMOVAL      before Larissa    SPINE SURGERY  5-13-13    CERVICAL FUSION     Family History   Problem Relation Age of Onset    Cancer Mother 55        breast    Cancer Father         esophagus,had laryngectomy    Esophageal cancer Father     Parkinsonism Maternal Grandmother     Tremor Maternal Grandmother     Heart disease Maternal Uncle      Social History     Tobacco Use    Smoking status: Never Smoker    Smokeless tobacco: Never Used   Substance Use Topics    Alcohol use: No     Comment: denies    Drug use: No        Review of Systems   Constitutional: Negative for chills and fever.   HENT: Negative for sore throat and trouble swallowing.    Eyes: Negative for pain and discharge.   Respiratory: Negative for shortness of breath and wheezing.    Cardiovascular: Negative for chest pain and palpitations.   Gastrointestinal: Negative for abdominal pain, diarrhea, nausea and vomiting.   Genitourinary:        As per HPI   Musculoskeletal: Negative for back pain and joint swelling.   Skin: Negative for rash and wound.   Neurological: Negative for seizures, weakness and headaches.   Psychiatric/Behavioral: Negative for  "hallucinations. The patient is not nervous/anxious.      OBJECTIVE:     Anticoagulation:  No    Estimated body mass index is 30.08 kg/m² as calculated from the following:    Height as of an earlier encounter on 9/21/18: 5' 4" (1.626 m).    Weight as of this encounter: 79.5 kg (175 lb 4.3 oz).    Vital Signs (Most Recent)       Physical Exam   Nursing note and vitals reviewed.  Constitutional: She is oriented to person, place, and time. Vital signs are normal. She appears well-developed and well-nourished. No distress.   Neck: Trachea normal. Neck supple. No tracheal deviation present.   Cardiovascular: Normal rate and intact distal pulses.    Pulmonary/Chest: Effort normal. No accessory muscle usage. No respiratory distress.   Abdominal: Soft. She exhibits no distension. There is no tenderness. There is no CVA tenderness. No hernia.   There is no spleen or liver enlargement  Suprapubic catheter in place unable to irrigate fluid in or withdraw fluid   Musculoskeletal: She exhibits no edema or deformity.   Lymphadenopathy:     She has no cervical adenopathy.        Right: No inguinal adenopathy present.        Left: No inguinal adenopathy present.   Neurological: She is alert and oriented to person, place, and time.   Skin: Skin is warm and dry. No lesion and no rash noted. No cyanosis.     Psychiatric: She has a normal mood and affect. Judgment normal.     BMP  Lab Results   Component Value Date     06/12/2018    K 4.0 06/12/2018     06/12/2018    CO2 27 06/12/2018    BUN 7 (L) 06/12/2018    CREATININE 0.8 06/12/2018    CALCIUM 9.4 06/12/2018    ANIONGAP 10 06/12/2018    ESTGFRAFRICA >60 06/12/2018    EGFRNONAA >60 06/12/2018     Lab Results   Component Value Date    WBC 4.52 06/12/2018    HGB 11.1 (L) 06/12/2018    HCT 35.3 (L) 06/12/2018    MCV 85 06/12/2018     06/12/2018     ASSESSMENT     1. Encounter for care or replacement of suprapubic tube    2. Suprapubic catheter dysfunction, subsequent " encounter      PLAN:     - Suprapubic tube changed.   - Will order her SPT change every 3 weeks with home health instead of every 4 weeks    Nigel Garces MD

## 2018-09-21 NOTE — PROGRESS NOTES
Subjective:       Patient ID: Tasha Hawley is a 63 y.o. female.    Chief Complaint: Follow-up    HPI - Ms Hawley's suprapubic catheter was not draining, and she was leaking urine from below when she arrived late for her clinic visit.  Our nursing staff flushed the catheter, and it began working again.  She has been having new headaches for the past week, described as total head shooting pains followed by arm/neck numbness.  Different from her usual migraines.  Wonders has she had a stroke.  She still has sleep problems despite very aggressive sleep regimen.  She's not attentive to sleep hygiene and does not always go to bed at the same time.  She notes less LE edema, and says she's more awake and interactive during the day.  She is not a smoker.  Due for shingles vaccine and flu shot.    PMH:  Chronic insomnia.  Chronic low back pain with narcotic use.  Indwelling narcotic pump  History CVA with dysarthria  Neurogenic bladder, with recurrent UTI's.  SP catheter placed Nov 2016  Hypothyroidism, stable  CECI, not on CPAP  CAD, with ACS in Jan 2016 - subtot mid LAD lesion without PCI; ischemic CM with EF 40% and chronic LE edema. Followed by [facility] cardiology  Anxiety and Depression  Chronic constipation, likely d/t ongoing narcotic use  Intermittent nausea  Chronic bilateral LE lymphedema     Meds: Reviewed and reconciled in EPIC with patient during visit today.     Review of Systems   Constitutional: Negative for fever.   HENT: Negative for congestion.    Respiratory: Negative for shortness of breath.    Cardiovascular: Negative for chest pain.   Gastrointestinal: Positive for constipation.   Genitourinary: Positive for difficulty urinating.   Musculoskeletal: Positive for arthralgias and back pain.   Skin: Negative for rash.   Neurological: Positive for numbness and headaches.   Psychiatric/Behavioral: Positive for sleep disturbance. The patient is nervous/anxious.        Objective:      Physical Exam    Constitutional: She is oriented to person, place, and time. She appears well-developed and well-nourished.   HENT:   Head: Normocephalic and atraumatic.   Cardiovascular: Normal rate, regular rhythm and normal heart sounds. Exam reveals no gallop and no friction rub.   No murmur heard.  Pulmonary/Chest: Effort normal and breath sounds normal. No respiratory distress. She has no wheezes. She has no rales. She exhibits no tenderness.   Musculoskeletal: She exhibits edema (but improved).   Neurological: She is alert and oriented to person, place, and time.   Skin: Skin is warm and dry. No erythema.   Psychiatric: She has a normal mood and affect.   Nursing note and vitals reviewed.      Assessment:       1. Neurogenic bladder    2. Narcotic dependency, continuous    3. Insomnia due to medical condition    4. Chronic nonintractable headache, unspecified headache type    5. Need for shingles vaccine    6. Needs flu shot    7. Coronary artery disease involving native coronary artery of native heart without angina pectoris        Plan:       Tasha was seen today for follow-up.    Diagnoses and all orders for this visit:    Neurogenic bladder - unstable.  Suprapubic catheter started working during clinic visit and filled the bag.  She has Dr. Mayo's number and will call if it malfunctions again tonight or over the weekend    Narcotic dependency, continuous    Insomnia due to medical condition - no change here; perhaps some improvement.  Discussed sleep hygiene    Chronic nonintractable headache, unspecified headache type - new problem.  She's neurologically unchanged, so this is not a stroke.  Discussed use of her current pain medications for headache relief.  To ER if stroke-like symptoms occur, which I doubt  -     Lipid panel; Future    Need for shingles vaccine    Needs flu shot    Coronary artery disease involving native coronary artery of native heart without angina pectoris - stable.  Time for blood work  -      Comprehensive metabolic panel; Future  -     Lipid panel; Future    rtc prn, or in 3 months    G Sriram Hodges MD MPH  Staff Internist

## 2018-10-01 ENCOUNTER — TELEPHONE (OUTPATIENT)
Dept: UROLOGY | Facility: CLINIC | Age: 63
End: 2018-10-01

## 2018-10-01 NOTE — TELEPHONE ENCOUNTER
----- Message from Karoline Land MA sent at 10/1/2018 12:08 PM CDT -----  Contact: [phone]  Needs Advice    Reason for call: A home health nurse had to go to her house last night to change out her s/p tube  because it was clogged. States that today she has a lot of pressure again and a lot of sediment in her urine and the urine is dark and cloudy         Communication Preference: [phone]    Additional Information:

## 2018-10-01 NOTE — TELEPHONE ENCOUNTER
Pt c/o of being with a lot of bladder pressure, bladder pain, and chills. States she had some bloody discharge this morning and last night. Pt states HH nurse had to changed catheter last night d/t being clogged with sediment and unable to flush. Asking for culture to be done. No specimen collected at time of change.  nurse to collect urine for c&s.

## 2018-10-02 ENCOUNTER — HOSPITAL ENCOUNTER (EMERGENCY)
Facility: HOSPITAL | Age: 63
Discharge: HOME | End: 2018-10-02
Attending: EMERGENCY MEDICINE
Payer: MEDICARE

## 2018-10-02 ENCOUNTER — TELEPHONE (OUTPATIENT)
Dept: INTERNAL MEDICINE | Facility: CLINIC | Age: 63
End: 2018-10-02

## 2018-10-02 VITALS
DIASTOLIC BLOOD PRESSURE: 50 MMHG | BODY MASS INDEX: 29.88 KG/M2 | HEIGHT: 64 IN | RESPIRATION RATE: 20 BRPM | TEMPERATURE: 98 F | SYSTOLIC BLOOD PRESSURE: 94 MMHG | OXYGEN SATURATION: 98 % | HEART RATE: 49 BPM | WEIGHT: 175 LBS

## 2018-10-02 DIAGNOSIS — R00.1 BRADYCARDIA: ICD-10-CM

## 2018-10-02 DIAGNOSIS — E86.1 HYPOTENSION DUE TO HYPOVOLEMIA: Primary | ICD-10-CM

## 2018-10-02 LAB
ALBUMIN SERPL BCP-MCNC: 3.8 G/DL
ALP SERPL-CCNC: 104 U/L
ALT SERPL W/O P-5'-P-CCNC: 20 U/L
ANION GAP SERPL CALC-SCNC: 5 MMOL/L
AST SERPL-CCNC: 22 U/L
BASOPHILS # BLD AUTO: 0.01 K/UL
BASOPHILS NFR BLD: 0.2 %
BILIRUB SERPL-MCNC: 0.6 MG/DL
BILIRUB UR QL STRIP: NEGATIVE
BUN SERPL-MCNC: 10 MG/DL
CALCIUM SERPL-MCNC: 9.4 MG/DL
CHLORIDE SERPL-SCNC: 96 MMOL/L
CLARITY UR: CLEAR
CO2 SERPL-SCNC: 35 MMOL/L
COLOR UR: YELLOW
CREAT SERPL-MCNC: 0.9 MG/DL
DIFFERENTIAL METHOD: ABNORMAL
EOSINOPHIL # BLD AUTO: 0.2 K/UL
EOSINOPHIL NFR BLD: 4 %
ERYTHROCYTE [DISTWIDTH] IN BLOOD BY AUTOMATED COUNT: 13.9 %
EST. GFR  (AFRICAN AMERICAN): >60 ML/MIN/1.73 M^2
EST. GFR  (NON AFRICAN AMERICAN): >60 ML/MIN/1.73 M^2
GLUCOSE SERPL-MCNC: 119 MG/DL
GLUCOSE UR QL STRIP: NEGATIVE
HCT VFR BLD AUTO: 37.1 %
HGB BLD-MCNC: 11.7 G/DL
HGB UR QL STRIP: ABNORMAL
KETONES UR QL STRIP: NEGATIVE
LACTATE SERPL-SCNC: 1.2 MMOL/L
LEUKOCYTE ESTERASE UR QL STRIP: ABNORMAL
LYMPHOCYTES # BLD AUTO: 2.1 K/UL
LYMPHOCYTES NFR BLD: 44.8 %
MAGNESIUM SERPL-MCNC: 1.7 MG/DL
MCH RBC QN AUTO: 27.5 PG
MCHC RBC AUTO-ENTMCNC: 31.5 G/DL
MCV RBC AUTO: 87 FL
MICROSCOPIC COMMENT: NORMAL
MONOCYTES # BLD AUTO: 0.4 K/UL
MONOCYTES NFR BLD: 8 %
NEUTROPHILS # BLD AUTO: 2 K/UL
NEUTROPHILS NFR BLD: 43 %
NITRITE UR QL STRIP: NEGATIVE
PH UR STRIP: 6 [PH] (ref 5–8)
PLATELET # BLD AUTO: 186 K/UL
PMV BLD AUTO: 10 FL
POTASSIUM SERPL-SCNC: 3.7 MMOL/L
PROT SERPL-MCNC: 6.7 G/DL
PROT UR QL STRIP: NEGATIVE
RBC # BLD AUTO: 4.26 M/UL
RBC #/AREA URNS HPF: 1 /HPF (ref 0–4)
SODIUM SERPL-SCNC: 136 MMOL/L
SP GR UR STRIP: <=1.005 (ref 1–1.03)
SQUAMOUS #/AREA URNS HPF: 1 /HPF
TROPONIN I SERPL DL<=0.01 NG/ML-MCNC: <0.006 NG/ML
TSH SERPL DL<=0.005 MIU/L-ACNC: 2.71 UIU/ML
URN SPEC COLLECT METH UR: ABNORMAL
UROBILINOGEN UR STRIP-ACNC: NEGATIVE EU/DL
WBC # BLD AUTO: 4.73 K/UL
WBC #/AREA URNS HPF: 3 /HPF (ref 0–5)
WBC CLUMPS URNS QL MICRO: NORMAL

## 2018-10-02 PROCEDURE — 80053 COMPREHEN METABOLIC PANEL: CPT

## 2018-10-02 PROCEDURE — 83735 ASSAY OF MAGNESIUM: CPT

## 2018-10-02 PROCEDURE — 85025 COMPLETE CBC W/AUTO DIFF WBC: CPT

## 2018-10-02 PROCEDURE — 83605 ASSAY OF LACTIC ACID: CPT

## 2018-10-02 PROCEDURE — 96361 HYDRATE IV INFUSION ADD-ON: CPT

## 2018-10-02 PROCEDURE — 87040 BLOOD CULTURE FOR BACTERIA: CPT | Mod: 59

## 2018-10-02 PROCEDURE — 84484 ASSAY OF TROPONIN QUANT: CPT

## 2018-10-02 PROCEDURE — 99284 EMERGENCY DEPT VISIT MOD MDM: CPT | Mod: 25

## 2018-10-02 PROCEDURE — 93005 ELECTROCARDIOGRAM TRACING: CPT

## 2018-10-02 PROCEDURE — 96360 HYDRATION IV INFUSION INIT: CPT

## 2018-10-02 PROCEDURE — 84443 ASSAY THYROID STIM HORMONE: CPT

## 2018-10-02 PROCEDURE — 81000 URINALYSIS NONAUTO W/SCOPE: CPT

## 2018-10-02 PROCEDURE — 25000003 PHARM REV CODE 250: Performed by: PHYSICIAN ASSISTANT

## 2018-10-02 RX ORDER — POTASSIUM CHLORIDE 20 MEQ/1
20 TABLET, EXTENDED RELEASE ORAL 2 TIMES DAILY
COMMUNITY
End: 2019-05-28

## 2018-10-02 RX ADMIN — SODIUM CHLORIDE 1000 ML: 0.9 INJECTION, SOLUTION INTRAVENOUS at 02:10

## 2018-10-02 RX ADMIN — SODIUM CHLORIDE 1000 ML: 0.9 INJECTION, SOLUTION INTRAVENOUS at 05:10

## 2018-10-02 NOTE — ED NOTES
Patient presents to ED with c/o Hypotension ([facility] health nurse checked her vitals today and she was hypotensive and bradycardic, with HR in the 30's.) Patient stated that her  had difficulty irrigating her suprapubic and had decrease urine output. Patient stated she felt pressure form her bladder area. Patient suprapubic catheter was changed on Sunday, stated that her urine has been flowing normal since the change but her doctor suspects a possible UTI. EKG performed and given to doctor, Blood labs drawn and sent to lab. Heart monitor on cont. Call light within reach, will cont to monitor.

## 2018-10-02 NOTE — TELEPHONE ENCOUNTER
Patient? Dehydrated, BP systolic I called the [facility] nurse Saray at , unable to get a urine sample, she is having low pelvic pain, vaginal pressure, post hysterectomy, these were her symptoms on yesterday.  She has a suprapubic catheter.  Her /60 then 96/64, after she drank some fluids, she was still unable to urinate.  I called the  nurse and she was in the driveway.  I looked back a few office visits and noted patient did have BP that were recorded in the low 100's and heart rate in the 60's. 10/2/2018 at 11:24 redoing BP and HR, Urine started to flow grupo with sediment, she has enough for a culture and UA, 90/60 HR 54. Last dose of Lasix at 9:30 am. I spoke to the patient and advised at least UC to see if she needs IV fluid after her bouts of diarrhea.

## 2018-10-02 NOTE — ED NOTES
No residual found using bladder scanner. Pt has meal. Denies any other needs at this time. Denies any complaints at this time.

## 2018-10-02 NOTE — ED PROVIDER NOTES
Encounter Date: 10/2/2018       History     Chief Complaint   Patient presents with    Hypotension     States home health nurse checked her vitals today and she was hypotensive and bradycardic, with HR in the 30's. Pt was having decrease in urine output, so had her suprapubic catheter changed at midnight on Sunday. States urine is now flowing again, but doctor suspects possible UTI.     62 yo female with PMH of lumbar radiculopathy, hypothyroid, urinary incontinence with suprapubic catheter presents to the ED with complaints of hypotension, bradycardiac, and low urine output times today. Patient states she has had diarrhea for the last two days, multiple episodes per day. She also states her catheter was malfunctioning 2 days ago and her home health nurse replaced with. Her home health nurse reported low BP and HR today and very little urine output to her doctor who instructed the patient to presents to the ED. She also admits to pelvis pressure and blood on the toilet paper when she wiped after urination x 2 days. She denies blood in stool, dysuria, fever, abdominal pain, anticoagulant use.       The history is provided by the patient. No  was used.     Review of patient's allergies indicates:   Allergen Reactions    (d)-limonene flavor      Other reaction(s): difficult intubation  Other reaction(s): Difficulty breathing    Bactrim [sulfamethoxazole-trimethoprim] Anaphylaxis    Benadryl [diphenhydramine hcl] Shortness Of Breath    Imitrex [sumatriptan succinate] Shortness Of Breath    Penicillins Shortness Of Breath     Other reaction(s): Jittery    Topamax [topiramate] Shortness Of Breath    Vancomycin Shortness Of Breath     Rash    Butorphanol tartrate     Darvocet a500 [propoxyphene n-acetaminophen]      Other reaction(s): Difficulty breathing    Fentanyl      Other reaction(s): Vomiting  Other reaction(s): Nausea  Other reaction(s): Itching  swelling    White petrolatum-zinc      Zinc oxide-white petrolatum      Other reaction(s): Difficulty breathing    Latex, natural rubber Itching and Rash    Phenytoin Rash and Other (See Comments)     Trouble breathing     Past Medical History:   Diagnosis Date    Anticoagulant long-term use     Anxiety     Arthritis     Bilateral lower extremity edema     severe chronic    Carotid artery occlusion     Cataract     Depression     Fever blister     Hypothyroid     Iron deficiency anemia     Lumbar radiculopathy     with chronic pain    Ocular migraine     Sleep apnea     cpap     Past Surgical History:   Procedure Laterality Date    BACK SURGERY      x 12    BIOPSY-BLADDER N/A 3/20/2018    Performed by Shireen Mayo MD at [facility] OR 1ST FLR    BIOPSY-BLADDER N/A 1/26/2017    Performed by Sh[facility]en [facility] MD at [facility] OR 1ST FLR    CATARACT EXTRACTION W/  INTRAOCULAR LENS IMPLANT Left     Dr Coleman     CYSTOSCOPY N/A 3/20/2018    Performed by Shireen Mayo MD at [facility] OR 1ST FLR    CYSTOSCOPY N/A 1/26/2017    Performed by Shireen Mayo MD at [facility] OR 1ST FLR    CYSTOSCOPY N/A 11/8/2016    Performed by Shireen Mayo MD at [facility] OR 2ND FLR    DISCECTOMY, SPINE, CERVICAL, ANTERIOR APPROACH, WITH FUSION N/A 5/13/2013    Performed by Larry Martinez MD at [facility] OR 2ND FLR    ESOPHAGOGASTRODUODENOSCOPY N/A 5/23/2018    Procedure: ESOPHAGOGASTRODUODENOSCOPY (EGD);  Surgeon: Prince Vance MD;  Location: [facility] (4TH FLR);  Service: Endoscopy;  Laterality: N/A;  r/s 'd per Dr. Vance due to family emergency- ER    ESOPHAGOGASTRODUODENOSCOPY (EGD) N/A 5/23/2018    Performed by Prince Vance MD at [facility] ENDO (4TH FLR)    ESOPHAGOGASTRODUODENOSCOPY (EGD) N/A 7/21/2017    Performed by Prince Vance MD at [facility] ENDO (4TH FLR)    ESOPHAGOGASTRODUODENOSCOPY (EGD) w/ dilation N/A 8/28/2017    Performed by Prince Vance MD at [facility] (4TH FLR)    FULGURATION-BLADDER N/A 1/26/2017    Performed by Shireen PICKETT  MD Maria Esther at [facility] OR 1ST FLR    HEART CATH-LEFT Left 1/7/2016    Performed by Alberto Gee MD at [facility] CATH LAB    HYSTERECTOMY  1975    endometriosis    INJECTION-BOTOX N/A 3/20/2018    Performed by Shireen Mayo MD at [facility] OR 1ST FLR    INJECTION-BOTOX N/A 1/26/2017    Performed by Shireen Mayo MD at [facility] OR 1ST FLR    INJECTION-BULKING AGENT URETHRAL  OUTLET N/A 3/20/2018    Performed by Shireen Mayo MD at [facility] OR 1ST FLR    INSERTION-INTRAOCULAR LENS (IOL) Left 11/13/2014    Performed by Sanjana Coleman MD at [facility] OR 1ST FLR    INSERTION-SUPRAPUBIC TUBE-OPEN N/A 11/8/2016    Performed by Shireen Mayo MD at [facility] OR 2ND FLR    MANOMETRY-ESOPHAGEAL-WITH IMPEDANCE N/A 4/23/2018    Performed by Robert Menendez MD at [facility] ENDO (4TH FLR)    MYELOGRAM N/A 2/19/2013    Performed by [facility] at [facility] OR 2ND FLR    pain pump placement      SQ Dilaudid Pump managed by Dr. Hillman, Pain Management    PHACOEMULSIFICATION-ASPIRATION-CATARACT Left 11/13/2014    Performed by Sanjana Coleman MD at [facility] OR 1ST FLR    UT UPPER GI ENDOSCOPY,DIAGNOSIS [54601 (CPT®)] N/A 7/16/2014    Performed by Prince Vance MD at [facility] ENDO (4TH FLR)    SPINAL CORD STIMULATOR REMOVAL      before Larissa    SPINE SURGERY  5-13-13    CERVICAL FUSION     Family History   Problem Relation Age of Onset    Cancer Mother 55        breast    Cancer Father         esophagus,had laryngectomy    Esophageal cancer Father     Parkinsonism Maternal Grandmother     Tremor Maternal Grandmother     No Known Problems Brother     No Known Problems Brother     Heart disease Maternal Uncle     No Known Problems Sister     No Known Problems Maternal Aunt     No Known Problems Paternal Aunt     No Known Problems Paternal Uncle     No Known Problems Maternal Grandfather     No Known Problems Paternal Grandmother     No Known Problems Paternal Grandfather     Melanoma Neg Hx     Amblyopia Neg Hx      Blindness Neg Hx     Cataracts Neg Hx     Diabetes Neg Hx     Glaucoma Neg Hx     Hypertension Neg Hx     Macular degeneration Neg Hx     Retinal detachment Neg Hx     Strabismus Neg Hx     Stroke Neg Hx     Thyroid disease Neg Hx     Colon cancer Neg Hx      Social History     Tobacco Use    Smoking status: Never Smoker    Smokeless tobacco: Never Used   Substance Use Topics    Alcohol use: No     Comment: denies    Drug use: No     Review of Systems   Constitutional: Negative for fever.   Cardiovascular:        Hypotension and bradycardia   Gastrointestinal: Positive for diarrhea. Negative for abdominal pain, constipation, nausea and vomiting.   Genitourinary: Positive for decreased urine volume. Negative for dysuria.        Suprapubic pressure   All other systems reviewed and are negative.      Physical Exam     Initial Vitals [10/02/18 1355]   BP Pulse Resp Temp SpO2   (!) 86/45 60 20 98.3 °F (36.8 °C) 97 %      MAP       --         Physical Exam    Nursing note and vitals reviewed.  Constitutional: Vital signs are normal. She appears well-developed and well-nourished. No distress.   HENT:   Head: Normocephalic and atraumatic.   Nose: Nose normal.   Eyes: Conjunctivae and lids are normal.   Neck: Normal range of motion and phonation normal.   Cardiovascular: Regular rhythm and normal heart sounds. Bradycardia present.    Hypotension noted.    Pulmonary/Chest: Effort normal and breath sounds normal.   Abdominal: Soft. Normal appearance and bowel sounds are normal. There is no tenderness. There is no rigidity and no guarding.   Suprapubic catheter in place. intraabdominal pain pump noted to RLQ.    Musculoskeletal: Normal range of motion.   Neurological: She is alert and oriented to person, place, and time.   Skin: Skin is warm and dry.   Psychiatric: She has a normal mood and affect. Her speech is normal and behavior is normal. Judgment and thought content normal. Cognition and memory are normal.          ED Course   Procedures  Labs Reviewed   CBC W/ AUTO DIFFERENTIAL - Abnormal; Notable for the following components:       Result Value    Hemoglobin 11.7 (*)     MCHC 31.5 (*)     All other components within normal limits   COMPREHENSIVE METABOLIC PANEL - Abnormal; Notable for the following components:    CO2 35 (*)     Glucose 119 (*)     Anion Gap 5 (*)     All other components within normal limits   URINALYSIS, REFLEX TO URINE CULTURE - Abnormal; Notable for the following components:    Specific Gravity, UA <=1.005 (*)     Occult Blood UA 2+ (*)     Leukocytes, UA 2+ (*)     All other components within normal limits    Narrative:     Preferred Collection Type->Urine, Catheterized   CULTURE, BLOOD   CULTURE, BLOOD   TSH   TROPONIN I   MAGNESIUM   LACTIC ACID, PLASMA   URINALYSIS MICROSCOPIC    Narrative:     Preferred Collection Type->Urine, Catheterized          Imaging Results          X-Ray Chest AP Portable (Final result)  Result time 10/02/18 15:43:48    Final result by Nancy Capps MD (10/02/18 15:43:48)                 Impression:      No acute abnormality.      Electronically signed by: Nancy Capps MD  Date:    10/02/2018  Time:    15:43             Narrative:    EXAMINATION:  XR CHEST AP PORTABLE    CLINICAL HISTORY:  hypotension;    TECHNIQUE:  Single frontal view of the chest was performed.    COMPARISON:  03/26/2018    FINDINGS:  The lungs are clear, with normal appearance of pulmonary vasculature and no pleural effusion or pneumothorax.    The cardiac silhouette is normal in size. The hilar and mediastinal contours are unremarkable.    Bones are intact.  Spinal wire stimulator projecting in the lower thoracic spine region.  Postoperative changes of the lower cervical spine.                                 Medical Decision Making:   History:   Old Medical Records: I decided to obtain old medical records.  Initial Assessment:   62 yo female with hypotension, bradycardia,  diarrhea, suprapubic pressure, and urine decrease. Suprapubic catheter in place. intraabdominal pain pump noted to RLQ. Non TTP. Bradycardia (51) and hypotension (86/45) noted. Normal heart sounds. Afebrile. EBBS. NAD.  Upon chart review, patient has had chronically low BP and HR.   Differential Diagnosis:   Dehydration, UTI  Clinical Tests:   Lab Tests: Ordered and Reviewed  Radiological Study: Ordered and Reviewed  ED Management:  Labs, x-ray, Iv Fluids ordered.  Labs grossly unremarkable. No urine seen on bladder scan. Patient has had 1500mL of urine output through catheter while in ED.   7:39 PM  Patients BP has improved to after 2L NS. She will be discharged with instructions to continue oral hydration and follow up with her PCP as soon as possible. Return to ED with an concerning symptoms.               Attending Attestation:     Physician Attestation Statement for NP/PA:   I have conducted a face to face encounter with this patient in addition to the NP/PA, due to NP/PA Request    Other NP/PA Attestation Additions:      Medical Decision Makin62 y/o female with hypotension, bradycardia, diarrhea, suprapubic pressure, and decreased UO. Suprapubic catheter in place. intraabdominal pain pump noted to RLQ. Non TTP. Bradycardia (51) and hypotension (86/45) noted. Normal heart sounds. Afebrile. EBBS. NAD.      Labs grossly unremarkable. No urine seen on bladder scan. Patient has had 1500mL of urine output through catheter while in ED.     Patients BP has improved to after 2L NS. Upon chart review, patient has had chronically low BP and HR.  She will be discharged with instructions to continue oral hydration and follow up with her PCP as soon as possible. Return to ED with an concerning symptoms.                       Clinical Impression:   The primary encounter diagnosis was Hypotension due to hypovolemia. A diagnosis of Bradycardia was also pertinent to this visit.                             Nathalie Paz,  NATHAN  10/02/18 1959       Nathalie Paz PA-C  10/02/18 2000       Tracey Gill MD  10/08/18 0701

## 2018-10-02 NOTE — TELEPHONE ENCOUNTER
----- Message from Amanda Grande sent at 10/2/2018 10:35 AM CDT -----  Contact: Liz Franz  [phone]  Type: Home Health (orders, updates, clarifications, etc.)    Home Health Agency[facility]Nurse: [facility]    Phone number: [phone]    Reason for call: Patient's heart rate is 48, blood pressure 96/64 just now. She's not having any symptoms. Had diarrhea 2 days, is ok now, is probably dehydrated. Making her drink liquids to get an urine sample. Nurse needs to speak with someone as what should the treatment plan should be, please call her back.    Thanks

## 2018-10-03 NOTE — DISCHARGE INSTRUCTIONS
Continue increasing water intake. You can also drink Gatorade or Pedialyte to help with dehydration

## 2018-10-04 ENCOUNTER — TELEPHONE (OUTPATIENT)
Dept: UROLOGY | Facility: CLINIC | Age: 63
End: 2018-10-04

## 2018-10-04 DIAGNOSIS — T83.510D URINARY TRACT INFECTION ASSOCIATED WITH CYSTOSTOMY CATHETER, SUBSEQUENT ENCOUNTER: Primary | ICD-10-CM

## 2018-10-04 DIAGNOSIS — N39.0 URINARY TRACT INFECTION ASSOCIATED WITH CYSTOSTOMY CATHETER, SUBSEQUENT ENCOUNTER: Primary | ICD-10-CM

## 2018-10-04 NOTE — TELEPHONE ENCOUNTER
I spoke with patient who stated her home health nurse came out yesterday and took urine sample for urine culture, we don't have a urine culture pending for her in [facility], her home health nurse said she sent the urine sample to [facility] and will send the results to , I offered patient a appt, but she refused , I advised patient if she continued with pain, or started with a fever she needs to come in to to the clinic or the ER, Patient agreed.  notified.

## 2018-10-04 NOTE — TELEPHONE ENCOUNTER
----- Message from Sis Calixto sent at 10/4/2018  4:47 PM CDT -----  Contact: pt: [phone]  Test Results    Type of Test: specimen test   Date of Test:  10/01 or 10/02  Communication Preference: pt: [phone]  Additional Information: having painful sensation below

## 2018-10-05 ENCOUNTER — TELEPHONE (OUTPATIENT)
Dept: INTERNAL MEDICINE | Facility: CLINIC | Age: 63
End: 2018-10-05

## 2018-10-05 RX ORDER — CEPHALEXIN 500 MG/1
500 CAPSULE ORAL EVERY 8 HOURS
Qty: 21 CAPSULE | Refills: 0 | Status: SHIPPED | OUTPATIENT
Start: 2018-10-05 | End: 2018-10-12

## 2018-10-05 NOTE — TELEPHONE ENCOUNTER
----- Message from Kalina Cohn MA sent at 10/5/2018 12:07 PM CDT -----  Contact: Liz mike/[facility]    Needs Advice    Reason for call:  We draw an ua and a c&s on Tuesday, 10-2-18 and we are calling for treatment plan.  The results were faxed        Communication Preference:  Phone# above    Additional Information:  na

## 2018-10-05 NOTE — TELEPHONE ENCOUNTER
Spoke w/ Liz ([facility] nurse), asking if Dr. Mayo would send in abx to pt's pharmacy, as pt c/o bladder pressure.

## 2018-10-05 NOTE — TELEPHONE ENCOUNTER
Sent keflex as she requested it, has taken in the past and tolerates it.     Preliminary shows proteus, looked at previous positive proteus culture and it was sensitive to cephalosporins.

## 2018-10-07 LAB
BACTERIA BLD CULT: NORMAL
BACTERIA BLD CULT: NORMAL

## 2018-10-16 ENCOUNTER — TELEPHONE (OUTPATIENT)
Dept: UROLOGY | Facility: CLINIC | Age: 63
End: 2018-10-16

## 2018-10-16 ENCOUNTER — TELEPHONE (OUTPATIENT)
Dept: PSYCHIATRY | Facility: CLINIC | Age: 63
End: 2018-10-16

## 2018-10-16 DIAGNOSIS — G47.00 INSOMNIA, UNSPECIFIED TYPE: ICD-10-CM

## 2018-10-16 RX ORDER — TRAZODONE HYDROCHLORIDE 100 MG/1
200 TABLET ORAL NIGHTLY
Qty: 60 TABLET | Refills: 0 | Status: SHIPPED | OUTPATIENT
Start: 2018-10-16 | End: 2018-11-15 | Stop reason: SDUPTHER

## 2018-10-16 RX ORDER — QUETIAPINE FUMARATE 100 MG/1
100 TABLET, FILM COATED ORAL NIGHTLY
Qty: 30 TABLET | Refills: 0 | Status: SHIPPED | OUTPATIENT
Start: 2018-10-16 | End: 2018-11-15

## 2018-10-16 RX ORDER — BUPROPION HYDROCHLORIDE 300 MG/1
300 TABLET ORAL DAILY
Qty: 30 TABLET | Refills: 0 | Status: SHIPPED | OUTPATIENT
Start: 2018-10-16 | End: 2018-11-15 | Stop reason: SDUPTHER

## 2018-10-16 RX ORDER — FLUOXETINE HYDROCHLORIDE 20 MG/1
60 CAPSULE ORAL DAILY
Qty: 90 CAPSULE | Refills: 0 | Status: SHIPPED | OUTPATIENT
Start: 2018-10-16 | End: 2018-11-15 | Stop reason: SDUPTHER

## 2018-10-16 NOTE — TELEPHONE ENCOUNTER
----- Message from Chet Batista MA sent at 10/16/2018  1:09 PM CDT -----  Contact: Self [phone]  The above pt is requesting a call back, stated that it's for medical reasons, please call and advice    Thanks

## 2018-10-16 NOTE — TELEPHONE ENCOUNTER
Spoke w/ Liz ( nurse) states pt c/o still having UTI after completing Keflex 3 days ago. States pt c/o having bladder pressure and foul odor with urine, along with a lot of sediment.     Wants advice on what Dr. Mayo prefers to do, as pt c/o these symptoms weekly.

## 2018-10-16 NOTE — TELEPHONE ENCOUNTER
----- Message from Elida Cummings sent at 10/16/2018  3:17 PM CDT -----  Contact: Saray with [facility] #[phone]  Needs Advice    Reason for call:She states that has signs of uti including odor      Communication Preference:call    Additional Information:

## 2018-10-22 ENCOUNTER — PES CALL (OUTPATIENT)
Dept: ADMINISTRATIVE | Facility: CLINIC | Age: 63
End: 2018-10-22

## 2018-10-24 ENCOUNTER — OFFICE VISIT (OUTPATIENT)
Dept: INTERNAL MEDICINE | Facility: CLINIC | Age: 63
End: 2018-10-24
Payer: MEDICARE

## 2018-10-24 VITALS
WEIGHT: 168.81 LBS | DIASTOLIC BLOOD PRESSURE: 58 MMHG | HEIGHT: 64 IN | HEART RATE: 57 BPM | SYSTOLIC BLOOD PRESSURE: 92 MMHG | OXYGEN SATURATION: 99 % | BODY MASS INDEX: 28.82 KG/M2

## 2018-10-24 VITALS
WEIGHT: 168 LBS | BODY MASS INDEX: 28.68 KG/M2 | SYSTOLIC BLOOD PRESSURE: 92 MMHG | HEIGHT: 64 IN | OXYGEN SATURATION: 96 % | HEART RATE: 51 BPM | DIASTOLIC BLOOD PRESSURE: 62 MMHG

## 2018-10-24 DIAGNOSIS — Z00.00 ENCOUNTER FOR PREVENTIVE HEALTH EXAMINATION: Primary | ICD-10-CM

## 2018-10-24 DIAGNOSIS — I70.0 THORACIC AORTA ATHEROSCLEROSIS: ICD-10-CM

## 2018-10-24 DIAGNOSIS — M47.26 OSTEOARTHRITIS OF SPINE WITH RADICULOPATHY, LUMBAR REGION: Chronic | ICD-10-CM

## 2018-10-24 DIAGNOSIS — F11.20 NARCOTIC DEPENDENCY, CONTINUOUS: Chronic | ICD-10-CM

## 2018-10-24 DIAGNOSIS — M54.2 NECK PAIN: Primary | ICD-10-CM

## 2018-10-24 DIAGNOSIS — I25.5 ISCHEMIC CARDIOMYOPATHY: Chronic | ICD-10-CM

## 2018-10-24 DIAGNOSIS — G89.29 CHRONIC INTRACTABLE HEADACHE, UNSPECIFIED HEADACHE TYPE: ICD-10-CM

## 2018-10-24 DIAGNOSIS — G89.4 CHRONIC PAIN SYNDROME: Chronic | ICD-10-CM

## 2018-10-24 DIAGNOSIS — M47.816 FACET ARTHROPATHY, LUMBAR: ICD-10-CM

## 2018-10-24 DIAGNOSIS — N31.9 NEUROGENIC BLADDER: Chronic | ICD-10-CM

## 2018-10-24 DIAGNOSIS — M47.812 OSTEOARTHRITIS OF CERVICAL SPINE, UNSPECIFIED SPINAL OSTEOARTHRITIS COMPLICATION STATUS: ICD-10-CM

## 2018-10-24 DIAGNOSIS — I25.10 CORONARY ARTERY DISEASE INVOLVING NATIVE CORONARY ARTERY OF NATIVE HEART WITHOUT ANGINA PECTORIS: Chronic | ICD-10-CM

## 2018-10-24 DIAGNOSIS — R51.9 CHRONIC INTRACTABLE HEADACHE, UNSPECIFIED HEADACHE TYPE: ICD-10-CM

## 2018-10-24 DIAGNOSIS — R33.9 URINARY RETENTION: Chronic | ICD-10-CM

## 2018-10-24 DIAGNOSIS — R13.19 ESOPHAGEAL DYSPHAGIA: ICD-10-CM

## 2018-10-24 DIAGNOSIS — K21.9 GASTROESOPHAGEAL REFLUX DISEASE, ESOPHAGITIS PRESENCE NOT SPECIFIED: Chronic | ICD-10-CM

## 2018-10-24 DIAGNOSIS — F33.0 MAJOR DEPRESSIVE DISORDER, RECURRENT, MILD: ICD-10-CM

## 2018-10-24 DIAGNOSIS — R20.2 PARESTHESIA OF BOTH LOWER EXTREMITIES: ICD-10-CM

## 2018-10-24 DIAGNOSIS — I77.9 BILATERAL CAROTID ARTERY DISEASE, UNSPECIFIED TYPE: ICD-10-CM

## 2018-10-24 DIAGNOSIS — D50.9 IRON DEFICIENCY ANEMIA, UNSPECIFIED IRON DEFICIENCY ANEMIA TYPE: ICD-10-CM

## 2018-10-24 DIAGNOSIS — G95.9 CERVICAL MYELOPATHY: ICD-10-CM

## 2018-10-24 DIAGNOSIS — E03.9 PRIMARY HYPOTHYROIDISM: Chronic | ICD-10-CM

## 2018-10-24 DIAGNOSIS — I89.0 LYMPHEDEMA OF BOTH LOWER EXTREMITIES: Chronic | ICD-10-CM

## 2018-10-24 DIAGNOSIS — Z93.59 SUPRAPUBIC CATHETER: Chronic | ICD-10-CM

## 2018-10-24 DIAGNOSIS — F41.1 GENERALIZED ANXIETY DISORDER: ICD-10-CM

## 2018-10-24 PROCEDURE — 99999 PR PBB SHADOW E&M-EST. PATIENT-LVL III: CPT | Mod: PBBFAC,,, | Performed by: INTERNAL MEDICINE

## 2018-10-24 PROCEDURE — G0439 PPPS, SUBSEQ VISIT: HCPCS | Mod: ,,, | Performed by: NURSE PRACTITIONER

## 2018-10-24 PROCEDURE — 99999 PR PBB SHADOW E&M-EST. PATIENT-LVL IV: CPT | Mod: PBBFAC,,, | Performed by: NURSE PRACTITIONER

## 2018-10-24 PROCEDURE — 99214 OFFICE O/P EST MOD 30 MIN: CPT | Mod: S$PBB,,, | Performed by: INTERNAL MEDICINE

## 2018-10-24 PROCEDURE — 3008F BODY MASS INDEX DOCD: CPT | Mod: CPTII,,, | Performed by: INTERNAL MEDICINE

## 2018-10-24 PROCEDURE — 99213 OFFICE O/P EST LOW 20 MIN: CPT | Mod: PBBFAC,27 | Performed by: INTERNAL MEDICINE

## 2018-10-24 PROCEDURE — 99214 OFFICE O/P EST MOD 30 MIN: CPT | Mod: PBBFAC | Performed by: NURSE PRACTITIONER

## 2018-10-24 RX ORDER — LIDOCAINE 50 MG/G
1 PATCH TOPICAL DAILY PRN
Refills: 5 | Status: ON HOLD | COMMUNITY
Start: 2018-10-12 | End: 2020-05-17 | Stop reason: HOSPADM

## 2018-10-24 NOTE — PROGRESS NOTES
"Tasha Hawley presented for a  Medicare AWV and comprehensive Health Risk Assessment today. The following components were reviewed and updated:    · Medical history  · Family History  · Social history  · Allergies and Current Medications  · Health Risk Assessment  · Health Maintenance  · Care Team     ** See Completed Assessments for Annual Wellness Visit within the encounter summary.**       The following assessments were completed:  · Living Situation  · CAGE  · Depression Screening--PHQ score of 1 today  · Timed Get Up and Go--not done, impaired mobillity  · Whisper Test--not done hearing impaired  · Cognitive Function Screening  · Nutrition Screening  · ADL Screening  · PAQ Screening    Vitals:    10/24/18 1011   BP: (!) 92/58   BP Location: Right arm   Patient Position: Sitting   BP Method: Large (Automatic)   Pulse: (!) 57   SpO2: 99%   Weight: 76.6 kg (168 lb 12.8 oz)   Height: 5' 4" (1.626 m)     Body mass index is 28.97 kg/m².  Physical Exam   Constitutional: She is oriented to person, place, and time. Vital signs are normal. She appears well-developed and well-nourished.   HENT:   Head: Normocephalic.   Right Ear: Hearing, tympanic membrane, external ear and ear canal normal.   Left Ear: Hearing, tympanic membrane, external ear and ear canal normal.   Eyes: Conjunctivae, EOM and lids are normal. Pupils are equal, round, and reactive to light. Lids are everted and swept, no foreign bodies found.   Neck: Trachea normal, normal range of motion and full passive range of motion without pain. Neck supple. No JVD present. Carotid bruit is not present.   Cardiovascular: Regular rhythm, S1 normal, S2 normal, normal heart sounds, intact distal pulses and normal pulses. Bradycardia present.   Pulmonary/Chest: Effort normal and breath sounds normal.   Abdominal: Soft. Normal appearance and bowel sounds are normal. There is no hepatosplenomegaly.   Genitourinary:   Genitourinary Comments: Suprapubic catheter in " place   Musculoskeletal:   Impaired mobility, walks with rolling walker   Neurological: She is alert and oriented to person, place, and time. She has normal strength and normal reflexes.   Skin: Skin is warm, dry and intact. Capillary refill takes less than 2 seconds.   Psychiatric: She has a normal mood and affect. Her speech is normal and behavior is normal. Judgment and thought content normal. Cognition and memory are normal.   Vitals reviewed.        Diagnoses and health risks identified today and associated recommendations/orders:    1. Encounter for preventive health examination  Exam done    Health Maintenance updated    Records reviewed    2. Major depressive disorder, recurrent, mild  Chronic, managed by Psychiatry, PHQ score today 1, no SI or HI    3. Cervical myelopathy  Chronic, managed by PCP    4. Chronic pain syndrome  Chronic, managed by PCP    5. Osteoarthritis of spine with radiculopathy, lumbar region  Chronic, managed by PCP    6. Generalized anxiety disorder  Chronic, managed by Psychiatry    7. Narcotic dependency, continuous  On dilaudid pump for chronic pain    8. Thoracic aorta atherosclerosis  Chronic, managed by PCP    9. Ischemic cardiomyopathy  Chronic, managed by PCP    10. Coronary artery disease involving native coronary artery of native heart without angina pectoris  Chronic, managed by PCP     11. Bilateral carotid artery disease, unspecified type  Chronic, managed by PCP    12. Suprapubic catheter  Chronic, managed by Urology    13. Urinary retention  Chronic, managed by Urology    14. Neurogenic bladder  Chronic,  managed by Urology      15. Iron deficiency anemia, unspecified iron deficiency anemia type  Stable, managed by PCP    16. Primary hypothyroidism  Stable, followed by PCP    On thyroid meds, advised to take separate from other meds and vitamins    17. Gastroesophageal reflux disease, esophagitis presence not specified  Chronic, managed by GI    GERD: on meds, education  on dietary triggers done    18. Esophageal dysphagia  Chronic, managed by GI    GERD: on meds, education on dietary triggers done    19. Facet arthropathy, lumbar  Chronic, managed by PCP    20. Paresthesia of both lower extremities  Chronic, managed by PCP    21. Lymphedema of both lower extremities  Chronic, managed by PCP    22. BMI 28.0-28.9,adult  BMI reviewed      Provided Tasha with a 5-10 year written screening schedule and personal prevention plan. Recommendations were developed using the USPSTF age appropriate recommendations. Education, counseling, and referrals were provided as needed. After Visit Summary printed and given to patient which includes a list of additional screenings\tests needed.    Scheduled pt with Dr. Jackson, PCP Dr. Hodges is on vacation, for evaluation of pt complaint of head pain.    Courtney Glover, DNP

## 2018-10-24 NOTE — PATIENT INSTRUCTIONS
Counseling and Referral of Other Preventative  (Italic type indicates deductible and co-insurance are waived)    Patient Name: Tasha Hawley  Today's Date: 10/24/2018    Health Maintenance       Date Due Completion Date    Mammogram 11/30/2018--refused 11/30/2017 (Declined)    Override on 11/30/2017: Declined    Override on 11/28/2016: Declined (per md note)    Zoster Vaccine 01/07/2019 (Originally 8/25/2016) ---    Fecal Occult Blood Test (FOBT)/FitKit 03/26/2019 3/26/2018    Lipid Panel 09/21/2019 9/21/2018    High Dose Statin 10/02/2019 10/2/2018    TETANUS VACCINE 03/02/2027 3/2/2017        No orders of the defined types were placed in this encounter.    The following information is provided to all patients.  This information is to help you find resources for any of the problems found today that may be affecting your health:                Living healthy guide: www.[facility].[place].gov      Understanding Diabetes: www.diabetes.org      Eating healthy: www.cdc.gov/healthyweight      CDC home safety checklist: www.cdc.gov/steadi/patient.html      Agency on Aging: www.goea.[place].[facility]      Alcoholics anonymous (AA): www.aa.org      Physical Activity: www.donya.nih.gov/aq9weoo      Tobacco use: www.quitwithusla.org

## 2018-10-24 NOTE — PATIENT INSTRUCTIONS
Take zanaflex more regularly.     Let me know if you want PT for neck     We discussed Neuro consult for consideration of botox injections, but you prefer to confer with Dr Hillman first.

## 2018-10-24 NOTE — PROGRESS NOTES
Subjective:       Patient ID: Tasha Hawley is a 63 y.o. female.    Chief Complaint: Headache (for past 2m) and Neck Pain (for past 2m)    HPI   Pt of Dr Hodges.    C/o pain base of head radiating to temples 2 mo ago.  Shooting, stabbing pain.  Different from previous migraines.  Constant for 2 months.  No vomiting.  Some nausea.  No fever.  Chronic visual loss L eye.  SHe has dilaudid pump for chronic back ain.   Dr Hillman has given Fiorinol  No 3 for migraines in past.  Restless sleep.    CT of head 8/17 and 11/17.  CT neck 5/17- OA, surgical changes.  Dr Hodges mentioned headaches in 9/21 note.    In addition to duragesic pump, takes zanaflex and hydrocodone/acetaminophen prn.  Vicodin make headache worse.    Congenital speech impediment.  Hearing impediment.  Multiple medical problems as documented in Courtney Glover's note.        Review of Systems   Constitutional: Negative for fever and unexpected weight change.   HENT: Negative for congestion and postnasal drip.    Eyes: Negative for pain, discharge and visual disturbance.   Respiratory: Negative for cough, chest tightness, shortness of breath and wheezing.    Cardiovascular: Negative for chest pain and leg swelling.   Gastrointestinal: Negative for abdominal pain, constipation, diarrhea and nausea.   Genitourinary: Negative for difficulty urinating, dysuria and hematuria.   Musculoskeletal: Positive for back pain.   Skin: Negative for rash.   Neurological: Negative for headaches.   Psychiatric/Behavioral: Negative for dysphoric mood and sleep disturbance. The patient is not nervous/anxious.        Objective:      Physical Exam   Constitutional: She is oriented to person, place, and time. She appears well-developed and well-nourished. No distress.   HENT:   Skull nontender   Eyes: EOM are normal. Pupils are equal, round, and reactive to light.   Neck:   Post neck musculature is tight.  ROM reduced all directions.   Cardiovascular: Normal rate and  regular rhythm.   Pulmonary/Chest: Effort normal and breath sounds normal.   Neurological: She is alert and oriented to person, place, and time. No cranial nerve deficit.   Skin: She is not diaphoretic.   Psychiatric: She has a normal mood and affect. Her behavior is normal.       Assessment:       1. Neck pain    2. Osteoarthritis of cervical spine, unspecified spinal osteoarthritis complication status    3. Chronic intractable headache, unspecified headache type    4. Narcotic dependency, continuous        Plan:       Tasha was seen today for headache and neck pain.    Diagnoses and all orders for this visit:    Neck pain    Osteoarthritis of cervical spine, unspecified spinal osteoarthritis complication status    Chronic intractable headache, unspecified headache type    Narcotic dependency, continuous       Take zanaflex more regularly.     PT offered, but declined.     We discussed Neuro consult for consideration of botox injections, but she prefers to confer with Dr Hillman first.    CC Dr Hodges

## 2018-10-26 ENCOUNTER — TELEPHONE (OUTPATIENT)
Dept: UROLOGY | Facility: CLINIC | Age: 63
End: 2018-10-26

## 2018-10-26 DIAGNOSIS — N30.90 BLADDER INFECTION: Primary | ICD-10-CM

## 2018-10-26 RX ORDER — CIPROFLOXACIN 500 MG/1
500 TABLET ORAL 2 TIMES DAILY
Qty: 14 TABLET | Refills: 0 | Status: SHIPPED | OUTPATIENT
Start: 2018-10-26 | End: 2018-11-02

## 2018-10-26 RX ORDER — CIPROFLOXACIN 500 MG/1
500 TABLET ORAL 2 TIMES DAILY
Qty: 14 TABLET | Refills: 0 | Status: SHIPPED | OUTPATIENT
Start: 2018-10-26 | End: 2018-10-26

## 2018-10-26 NOTE — TELEPHONE ENCOUNTER
----- Message from Sis Calixto sent at 10/26/2018 12:50 PM CDT -----  Contact: rodrigo(homehealth nurse): [phone]  Needs Advice    Reason for call: pt home health nurse called stating pt result showed the pt has a UTI and would like to know would Dr. Mayo like to start the antibiotic treatment as soon as possible         Communication Preference: rodrigo(homehealth nurse): [phone]

## 2018-10-26 NOTE — TELEPHONE ENCOUNTER
Patient' s preliminary culture is positive for proteus, sensitivities are pending.  She has numerous allergies but can take Cipro.  Will have her hold her zanaflex while taking the cipro.

## 2018-10-26 NOTE — TELEPHONE ENCOUNTER
Notified Liz ([facility] nurse) that Dr. Mayo sent Cipro 500mg empirically and that pt needs to hold Zanaflex, while taking Cipro.

## 2018-10-30 ENCOUNTER — TELEPHONE (OUTPATIENT)
Dept: UROLOGY | Facility: CLINIC | Age: 63
End: 2018-10-30

## 2018-10-30 DIAGNOSIS — Z88.1 HX OF ANTIBIOTIC ALLERGY: ICD-10-CM

## 2018-10-30 DIAGNOSIS — Z98.890 HISTORY OF SUPRAPUBIC CATHETER: ICD-10-CM

## 2018-10-30 DIAGNOSIS — N30.90 BLADDER INFECTION: Primary | ICD-10-CM

## 2018-10-30 NOTE — TELEPHONE ENCOUNTER
----- Message from Karoline Land MA sent at 10/30/2018  1:32 PM CDT -----  Contact: Saray with [facility] [phone]   Needs Advice    Reason for call: Asking if you received the fax of pt's Final urine culture that was sent yesterday? She said Dr Mayo may need to  to change her antibiotics and that's pt has blood in her urine today        Communication Preference: Saray with [facility] [phone]

## 2018-10-30 NOTE — TELEPHONE ENCOUNTER
----- Message from Sis Calixto sent at 10/30/2018 11:48 AM CDT -----  Contact: pt: [phone]  Needs Advice    Reason for call: pt would like to speak with julissa, pt stated she is seeing blood in her urine        Communication Preference:  pt: [phone]    Additional Information: offer pt appt for today pt declined

## 2018-11-01 RX ORDER — CEPHALEXIN 500 MG/1
500 CAPSULE ORAL EVERY 8 HOURS
Qty: 30 CAPSULE | Refills: 0 | Status: SHIPPED | OUTPATIENT
Start: 2018-11-01 | End: 2018-11-08

## 2018-11-15 ENCOUNTER — OFFICE VISIT (OUTPATIENT)
Dept: PSYCHIATRY | Facility: CLINIC | Age: 63
End: 2018-11-15
Payer: MEDICARE

## 2018-11-15 VITALS — BODY MASS INDEX: 29.55 KG/M2 | WEIGHT: 172.19 LBS

## 2018-11-15 DIAGNOSIS — F33.41 RECURRENT MAJOR DEPRESSION IN PARTIAL REMISSION: Primary | ICD-10-CM

## 2018-11-15 DIAGNOSIS — R11.0 NAUSEA: ICD-10-CM

## 2018-11-15 DIAGNOSIS — G47.00 INSOMNIA, UNSPECIFIED TYPE: ICD-10-CM

## 2018-11-15 PROCEDURE — 99999 PR PBB SHADOW E&M-EST. PATIENT-LVL II: CPT | Mod: PBBFAC,HCNC,, | Performed by: PSYCHIATRY & NEUROLOGY

## 2018-11-15 PROCEDURE — 99214 OFFICE O/P EST MOD 30 MIN: CPT | Mod: HCNC,S$GLB,, | Performed by: PSYCHIATRY & NEUROLOGY

## 2018-11-15 PROCEDURE — 3008F BODY MASS INDEX DOCD: CPT | Mod: CPTII,HCNC,S$GLB, | Performed by: PSYCHIATRY & NEUROLOGY

## 2018-11-15 RX ORDER — BUPROPION HYDROCHLORIDE 300 MG/1
300 TABLET ORAL DAILY
Qty: 30 TABLET | Refills: 2 | Status: SHIPPED | OUTPATIENT
Start: 2018-11-15 | End: 2019-02-28 | Stop reason: SDUPTHER

## 2018-11-15 RX ORDER — TRAZODONE HYDROCHLORIDE 100 MG/1
200 TABLET ORAL NIGHTLY
Qty: 60 TABLET | Refills: 2 | Status: SHIPPED | OUTPATIENT
Start: 2018-11-15 | End: 2019-02-08

## 2018-11-15 RX ORDER — QUETIAPINE FUMARATE 50 MG/1
TABLET, FILM COATED ORAL
Qty: 90 TABLET | Refills: 2 | Status: SHIPPED | OUTPATIENT
Start: 2018-11-15 | End: 2019-01-02

## 2018-11-15 RX ORDER — FLUOXETINE HYDROCHLORIDE 20 MG/1
60 CAPSULE ORAL DAILY
Qty: 90 CAPSULE | Refills: 2 | Status: SHIPPED | OUTPATIENT
Start: 2018-11-15 | End: 2019-02-28 | Stop reason: SDUPTHER

## 2018-11-15 NOTE — PROGRESS NOTES
Outpatient Psychiatry Follow-Up Visit (MD/NP)    11/15/2018    Clinical Status of Patient:  Outpatient (Ambulatory)    Chief Complaint:  Tasha Hawley is a 63 y.o. female who presents today for follow-up of depression and anxiety.  Met with patient.      Interval History and Content of Current Session:  Interim Events/Subjective Report/Content of Current Session: She has been more depressed lately.  Relationship with Dangelo has deteriorated.  Urged her to ask him to come with her next time.   She asked to be back on Xanax for her anxiety.  She agreed instead to add an extra 50 mg of Seroquel in AM.      Psychotherapy:  · Target symptoms: depression, anxiety   · Why chosen therapy is appropriate versus another modality: relevant to diagnosis  · Outcome monitoring methods: self-report, observation  · Therapeutic intervention type: supportive psychotherapy  · Topics discussed/themes: relationships difficulties, stress related to medical comorbidities, building skills sets for symptom management, symptom recognition  · The patient's response to the intervention is accepting. The patient's progress toward treatment goals is fair.   · Duration of intervention: 10 minutes.    Review of Systems   · PSYCHIATRIC: Pertinant items are noted in the narrative.    Past Medical, Family and Social History: The patient's past medical, family and social history have been reviewed and updated as appropriate within the electronic medical record - see encounter notes.    Compliance: yes    Side effects: None    Risk Parameters:  Patient reports no suicidal ideation  Patient reports no homicidal ideation  Patient reports no self-injurious behavior  Patient reports no violent behavior    Exam (detailed: at least 9 elements; comprehensive: all 15 elements)   Constitutional  Vitals:  Most recent vital signs, dated less than 90 days prior to this appointment, were reviewed.   There were no vitals filed for this visit.     General:   unremarkable, age appropriate     Musculoskeletal  Muscle Strength/Tone:  no tremor   Gait & Station:  non-ataxic     Psychiatric  Speech:  no latency; no press   Mood & Affect:  anxious  congruent and appropriate   Thought Process:  normal and logical   Associations:  intact   Thought Content:  normal, no suicidality, no homicidality, delusions, or paranoia   Insight:  intact   Judgement: behavior is adequate to circumstances   Orientation:  grossly intact   Memory: intact for content of interview   Language: grossly intact   Attention Span & Concentration:  able to focus   Fund of Knowledge:  intact and appropriate to age and level of education     Assessment and Diagnosis   Status/Progress: Based on the examination today, the patient's problem(s) is/are adequately but not ideally controlled.  New problems have not been presented today.   Co-morbidities are complicating management of the primary condition.  There are no active rule-out diagnoses for this patient at this time.     General Impression: More anxious than usual      ICD-10-CM ICD-9-CM   1. Recurrent major depression in partial remission F33.41 296.35   2. Insomnia, unspecified type G47.00 780.52   3. Nausea R11.0 787.02       Intervention/Counseling/Treatment Plan   · Medication Management: Continue current medications. The risks and benefits of medication were discussed with the patient. except increase Seroquel 50 mg to 1 in AM and 2 at bedtime.      Return to Clinic: 2 months

## 2018-11-29 ENCOUNTER — HOSPITAL ENCOUNTER (OUTPATIENT)
Dept: RADIOLOGY | Facility: HOSPITAL | Age: 63
Discharge: HOME | End: 2018-11-29
Attending: PHYSICIAN ASSISTANT
Payer: MEDICARE

## 2018-11-29 DIAGNOSIS — M54.2 PAIN OF CERVICAL SPINE: Primary | ICD-10-CM

## 2018-11-29 DIAGNOSIS — M54.2 PAIN OF CERVICAL SPINE: ICD-10-CM

## 2018-11-29 PROCEDURE — 72052 X-RAY EXAM NECK SPINE 6/>VWS: CPT | Mod: TC,FY,HCNC

## 2018-11-29 PROCEDURE — 72052 X-RAY EXAM NECK SPINE 6/>VWS: CPT | Mod: 26,HCNC,, | Performed by: RADIOLOGY

## 2018-12-04 ENCOUNTER — OFFICE VISIT (OUTPATIENT)
Dept: ALLERGY | Facility: CLINIC | Age: 63
End: 2018-12-04
Payer: MEDICARE

## 2018-12-04 VITALS
HEIGHT: 64 IN | WEIGHT: 166.44 LBS | SYSTOLIC BLOOD PRESSURE: 98 MMHG | DIASTOLIC BLOOD PRESSURE: 76 MMHG | BODY MASS INDEX: 28.42 KG/M2

## 2018-12-04 DIAGNOSIS — Z88.2 ALLERGY TO SULFA DRUGS: ICD-10-CM

## 2018-12-04 DIAGNOSIS — Z88.9 MULTIPLE DRUG ALLERGIES: ICD-10-CM

## 2018-12-04 DIAGNOSIS — Z87.440 HISTORY OF RECURRENT UTIS: ICD-10-CM

## 2018-12-04 DIAGNOSIS — Z88.0 HISTORY OF PENICILLIN ALLERGY: Primary | ICD-10-CM

## 2018-12-04 PROCEDURE — 99204 OFFICE O/P NEW MOD 45 MIN: CPT | Mod: HCNC,GC,S$GLB, | Performed by: PEDIATRICS

## 2018-12-04 PROCEDURE — 3008F BODY MASS INDEX DOCD: CPT | Mod: CPTII,HCNC,GC,S$GLB | Performed by: PEDIATRICS

## 2018-12-04 PROCEDURE — 99999 PR PBB SHADOW E&M-EST. PATIENT-LVL IV: CPT | Mod: PBBFAC,HCNC,GC, | Performed by: PEDIATRICS

## 2018-12-04 NOTE — LETTER
December 4, 2018        Shireen Mayo MD  [address]  [facility] [address]             [facility] - Allergy/ Immunology  [address]  [facility] [address]  Phone: [phone]  Fax: [phone]   Patient: Tasha Hawley   MR Number: [id number]   YOB: 1955   Date of Visit: 12/4/2018       Dear Dr. Mayo:    Thank you for referring Tasha Hawley to me for evaluation. Below are the relevant portions of my assessment and plan of care.     Tasha Hawley is a 63 y.o. female with      History of penicillin allergy-questionable history of IgE mediated allergy as a child, though details of the reaction are not known. We discussed that the skin testing for penicillin is well validated and that a negative skin test result reliably predicts the lack of an IgE mediated allergy with 97-99% accuracy. I offered penicillin skin testing with possible oral amoxicillin challenge in clinic today, but patient prefers to return next week to have this performed.   -RTC in 1 week for penicillin skin testing +/- amoxicillin challenge  -Advised to hold all antihistamines for 1 week prior to appointment      History of bactrim allergy-history is concerning for IgE mediated allergy to bactrim, but the symptoms she reported with bactrim exposure were inconsistent throughout our visit. Initially she endorsed that she had a rash with mucosal ulceration/erosions following bactrim, but on clarification denied this. Generally, if there is a concern for SJS/TEN, the use of the culprit medication is contraindicated. However, due to the complexity of her case, we could consider investigating her bactrim allergy, acknowledging that the potential benefits of bactrim use could outweigh the significant risks SJS/TEN poses. Bactrim skin testing +/- graded challenge would do little to predict the development of SJS/TEN, as these reactions are more delayed and can occur days later. Additionally, bactrim skin  testing is not as well validated as penicillin skin testing for predicting IgE mediated allergy. We could consider going straight to graded challenge and foregoing skin testing for this reason. However, in talking with the patient, she has significant anxiety re: her reaction to bactrim and she stated today that she wishes to continue to avoid it. This can be an ongoing discussion between her and her providers if this medication proves necessary to treat her UTIs in the future. We would be happy to perform skin testing and/or graded challenge to bactrim in the future if the patient is amenable.      Follow up: in 1 week for penicillin skin testing       If you have questions, please do not hesitate to call me. I look forward to following Tasha along with you.    Sincerely,      Leslie Sanders MD           CC  No Recipients

## 2018-12-04 NOTE — PROGRESS NOTES
ALLERGY & IMMUNOLOGY CLINIC -INITIAL CONSULTATION     HISTORY OF PRESENT ILLNESS     Patient ID: Tasha Hawley is a 63 y.o. female    CC: drug allergy     HPI: Ms. Hawley is a 62 yo female with multiple medical problems including lumbar radiculopathy, hypothyroid, and neurogenic bladder with suprapubic catheter dependence here for evaluation of drug allergy. She has a history of recurrent UTIs and is being referred from her urologist to evaluate multiple antibiotic allergies. She does not remember the details of the majority of her reactions, but she does remember the following:     She was told she was penicillin allergic as a child; she remembers being told she had vomiting, rash and shortness of breath with penicillins. She believes she was given penicillin as an adult several years ago, and she broke out in a rash. She does not specifically remember the last time she had penicillin or whether she has had amoxicillin. She tolerates keflex without problems.     With bactrim she developed throat swelling, difficulty breathing and itching after 15-30 minutes from ingestion. This occurred a couple years ago. Prior to this, she had tolerated bactrim many times. There is no history of mucosal rash with bactrim. She is very hesitant/anxious regarding this reaction and does not wish to take bactrim again.     Vancomycin caused repetitive vomiting on her first exposure to vancomycin.     She has allergies listed to multiple other medications including benadryl, imitrex, fentanyl, topiramate, darvocet. She develops shortness of breath with these medications. With fentanyl, she also developed repetitive vomiting.     She has not taken antihistamines recently. She does not wish to stay today for penicillin skin testing +/- challenge, but instead prefers to return next week.      REVIEW OF SYSTEMS     CONST: no fever, chills, weight loss  NEURO: no headache, no dizziness  EYES: no discharge, no pruritus, no  "erythema  EARS: no recurrent ear infections or sensation of fullness  NOSE: no congestion, no rhinorrhea, no discharge, no itching, no sneezing  PULM: no SOB, no wheezing, no cough, no snoring  CV: no CP, no palpitations, +leg swelling  GI: no nausea, vomiting, diarrhea or abdominal pain  URO: see HPI; recurrent UTIs, hematuria, suprapubic catheter dependence   MSK: chronic pain, osteoarthritis, requires pain pump   DERM: no rashes, no skin breaks     MEDICAL HISTORY     MedHx: active problems reviewed  SurgHx: multiple back surgeries, neck surgery, suprapubic catheter, pain pump insertion, tonsillectomy   SocHx: : Lives in [place] with her . Disabled. 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 No tobacco or etoh use.   FamHx: Cancer, heart disease. No significant allergic or autoimmune disorders  Allergies: see below  Medications: MAR reviewed  Vaccines: UTD    H/o Asthma: denies  H/o Eczema: denies  H/o Rhinitis: denies  Oral Allergy: denies  Food Allergy: denies  Venom Allergy: denies  Latex Allergy: rash and itching  Other Allergies: denies  Env/Occ: denies any evironmental or occupational exposures     PHYSICAL EXAM     VS: BP 98/76 (BP Location: Right arm, Patient Position: Sitting)   Ht 5' 4" (1.626 m)   Wt 75.5 kg (166 lb 7.2 oz)   LMP  (LMP Unknown)   BMI 28.57 kg/m²   GENERAL: AAOx4, NAD, cooperative, uses a walker   EYES: no conjunctival injection, no discharge, no infraorbital shiners, wears glasses   NOSE: no stringing mucous, no polyps  ORAL: MMM  LUNGS: CTAB, no w/r/c, no increased WOB  HEART: RRR, normal S1/S2, no m/g/r  EXTREMITIES: significant edema to lower extremities   DERM: no rashes, no skin breaks     LABORATORY STUDIES     Component      Latest Ref Rng & Units 10/2/2018   WBC      3.90 - 12.70 K/uL 4.73   RBC      4.00 - 5.40 M/uL 4.26   Hemoglobin      12.0 - 16.0 g/dL 11.7 (L)   Hematocrit      37.0 - 48.5 % 37.1   MCV      82 - 98 fL 87   MCH      27.0 - 31.0 pg 27.5   MCHC      " 32.0 - 36.0 g/dL 31.5 (L)   RDW      11.5 - 14.5 % 13.9   Platelets      150 - 350 K/uL 186   MPV      9.2 - 12.9 fL 10.0   Gran # (ANC)      1.8 - 7.7 K/uL 2.0   Lymph #      1.0 - 4.8 K/uL 2.1   Mono #      0.3 - 1.0 K/uL 0.4   Eos #      0.0 - 0.5 K/uL 0.2   Baso #      0.00 - 0.20 K/uL 0.01   Gran%      38.0 - 73.0 % 43.0   Lymph%      18.0 - 48.0 % 44.8   Mono%      4.0 - 15.0 % 8.0   Eosinophil%      0.0 - 8.0 % 4.0   Basophil%      0.0 - 1.9 % 0.2   Differential Method       Automated   Sodium      136 - 145 mmol/L 136   Potassium      3.5 - 5.1 mmol/L 3.7   Chloride      95 - 110 mmol/L 96   CO2      23 - 29 mmol/L 35 (H)   Glucose      70 - 110 mg/dL 119 (H)   BUN, Bld      8 - 23 mg/dL 10   Creatinine      0.5 - 1.4 mg/dL 0.9   Calcium      8.7 - 10.5 mg/dL 9.4   Total Protein      6.0 - 8.4 g/dL 6.7   Albumin      3.5 - 5.2 g/dL 3.8   Total Bilirubin      0.1 - 1.0 mg/dL 0.6   Alkaline Phosphatase      55 - 135 U/L 104   AST      10 - 40 U/L 22   ALT      10 - 44 U/L 20   Anion Gap      8 - 16 mmol/L 5 (L)   eGFR if African American      >60 mL/min/1.73 m:2 >60   eGFR if non African American      >60 mL/min/1.73 m:2 >60   Specimen UA       Urine, Supra Pubic   Color, UA      Yellow, Straw, Leola Yellow   Appearance, UA      Clear Clear   pH, UA      5.0 - 8.0 6.0   Specific Gravity, UA      1.005 - 1.030 <=1.005 (A)   Protein, UA      Negative Negative   Glucose, UA      Negative Negative   Ketones, UA      Negative Negative   Bilirubin (UA)      Negative Negative   Occult Blood UA      Negative 2+ (A)   Nitrite, UA      Negative Negative   Urobilinogen, UA      <2.0 EU/dL Negative   Leukocytes, UA      Negative 2+ (A)   RBC, UA      0 - 4 /hpf 1   WBC, UA      0 - 5 /hpf 3   WBC Clumps, UA      None-Rare Rare   Squam Epithel, UA      /hpf 1   Microscopic Comment       SEE COMMENT   TSH      0.400 - 4.000 uIU/mL 2.714   Troponin I      0.000 - 0.026 ng/mL <0.006   Magnesium      1.6 - 2.6 mg/dL 1.7    Lactate, Tho      0.5 - 2.2 mmol/L 1.2     Urine Culture, Routine 6/11/18 STAPHYLOCOCCUS AUREUS   > 100,000 cfu/ml       Resulting Agency [facility]   Susceptibility      Staphylococcus aureus     CULTURE, URINE     Nitrofurantoin <=32  Sensitive     Oxacillin 0.5  Sensitive     Penicillin >8  Resistant     Tetracycline >8  Resistant     Trimeth/Sulfa <=0.5/9.5  Sensitive            CHART REVIEW     Reviewed labs, urine cultures, urology notes, patient correspondence      ASSESSMENT & PLAN     Tasha Hawley is a 63 y.o. female with     History of penicillin allergy-questionable history of IgE mediated allergy as a child, though details of the reaction are not known. We discussed that the skin testing for penicillin is well validated and that a negative skin test result reliably predicts the lack of an IgE mediated allergy with 97-99% accuracy. I offered penicillin skin testing with possible oral amoxicillin challenge in clinic today, but patient prefers to return next week to have this performed.   -RTC in 1 week for penicillin skin testing +/- amoxicillin challenge  -Advised to hold all antihistamines for 1 week prior to appointment     History of bactrim allergy-history is concerning for IgE mediated allergy to bactrim, but the symptoms she reported with bactrim exposure were inconsistent throughout our visit. Initially she endorsed that she had a rash with mucosal ulceration/erosions following bactrim, but on clarification denied this. Generally, if there is a concern for SJS/TEN, the use of the culprit medication is contraindicated. However, due to the complexity of her case, we could consider investigating her bactrim allergy, acknowledging that the potential benefits of bactrim use could outweigh the significant risks SJS/TEN poses. Bactrim skin testing +/- graded challenge would do little to predict the development of SJS/TEN, as these reactions are more delayed and can occur days later. Additionally,  bactrim skin testing is not as well validated as penicillin skin testing for predicting IgE mediated allergy. We could consider going straight to graded challenge and foregoing skin testing for this reason. However, in talking with the patient, she has significant anxiety re: her reaction to bactrim and she stated today that she wishes to continue to avoid it. This can be an ongoing discussion between her and her providers if this medication proves necessary to treat her UTIs in the future. We would be happy to perform skin testing and/or graded challenge to bactrim in the future if the patient is amenable.     Multiple other drug allergies-reactions to multiple pain medications, benadryl and vancomycin. History does not clearly indicate IgE mediated allergy, but patient can continue to avoid these for now.     Follow up: in 1 week for penicillin skin testing      Discussed with Dr. Angy Sanders MD  Allergy/Immunology Fellow

## 2018-12-06 DIAGNOSIS — N30.90 BLADDER INFECTION: ICD-10-CM

## 2018-12-06 DIAGNOSIS — N32.81 DETRUSOR INSTABILITY: Primary | ICD-10-CM

## 2018-12-06 RX ORDER — OXYBUTYNIN CHLORIDE 15 MG/1
TABLET, EXTENDED RELEASE ORAL
Qty: 30 TABLET | OUTPATIENT
Start: 2018-12-06

## 2018-12-06 RX ORDER — OXYBUTYNIN CHLORIDE 15 MG/1
15 TABLET, EXTENDED RELEASE ORAL DAILY
Qty: 30 TABLET | Refills: 11 | Status: SHIPPED | OUTPATIENT
Start: 2018-12-06 | End: 2019-01-11

## 2018-12-06 RX ORDER — DOXYCYCLINE 100 MG/1
100 CAPSULE ORAL 2 TIMES DAILY
Qty: 14 CAPSULE | Refills: 0 | Status: SHIPPED | OUTPATIENT
Start: 2018-12-06 | End: 2018-12-11

## 2018-12-06 NOTE — TELEPHONE ENCOUNTER
----- Message from Kalina Cohn MA sent at 12/6/2018 12:28 PM CST -----  Contact: self  [phone]  Needs Advice    Reason for call:  Sediment, burning, pressure in my urine.  And I am constantly leaking.  I need an antibiotic        Communication Preference:  Phone# above    Additional Information:  na

## 2018-12-06 NOTE — TELEPHONE ENCOUNTER
----- Message from Sis Calixto sent at 12/6/2018  3:04 PM CST -----  Contact: pt: [phone]  Rx Refill/Request     Is this a Refill or New Rx:  Refill    Rx Name and Strength:   oxybutynin (DITROPAN XL) 15 MG TR24       Preferred Pharmacy with phone number:  [facility] - [place], [place] - [address] [phone] (Phone)  [phone] (Fax)      Communication Preference: pt: [phone]    Additional Information:  Pt would like to speak with Christi

## 2018-12-06 NOTE — TELEPHONE ENCOUNTER
Called pt, spoke w/ HH nurse (Liz), as she was at pt's home to collect urine specimen. States pt has lots of sediment and blood clots, malodorous urine, bladder pressure/ pain and leakage from urethra. States pt denies fever/chills, nausea/ vomiting.     Asking if Dr. Mayo would accept urine specimen from sptube that was changed on last Thursday (11/29/2018), as pt does not want her to change it. Advised that Dr. Mayo would want specimen from a new catheter, would verify with provider if she would accept from 1 week old catheter.

## 2018-12-06 NOTE — TELEPHONE ENCOUNTER
Called Liz at [phone], notified that Dr. Mayo states urine specimen for culture has to come from a new catheter. Expressed understanding, notified pt of need to change catheter.

## 2018-12-06 NOTE — TELEPHONE ENCOUNTER
Patient had called stating that she has increased sediment and gross hematuria.  The home health nurse was at her house and was instructed to change the catheter, get a urine specimen from the new catheter and send for urine culture.   Doxycycline sent to [facility]

## 2018-12-06 NOTE — TELEPHONE ENCOUNTER
----- Message from Waldo Winters sent at 12/6/2018  2:01 PM CST -----  Contact: Osei So:[phone]  .Needs Advice    Reason for call:Saray MENDOZA called and states the pt has a lot of sediment in her urine and blood clots. Pt also has leakage and foul odor.         Communication Preference:Osei So:[phone]    Additional Information:

## 2018-12-10 ENCOUNTER — TELEPHONE (OUTPATIENT)
Dept: INTERNAL MEDICINE | Facility: CLINIC | Age: 63
End: 2018-12-10

## 2018-12-10 ENCOUNTER — TELEPHONE (OUTPATIENT)
Dept: UROLOGY | Facility: CLINIC | Age: 63
End: 2018-12-10

## 2018-12-10 DIAGNOSIS — N30.90 BLADDER INFECTION: Primary | ICD-10-CM

## 2018-12-10 DIAGNOSIS — N30.90 BLADDER INFECTION: ICD-10-CM

## 2018-12-10 RX ORDER — ATORVASTATIN CALCIUM 80 MG/1
TABLET, FILM COATED ORAL
Qty: 90 TABLET | Refills: 3 | Status: SHIPPED | OUTPATIENT
Start: 2018-12-10 | End: 2019-11-07 | Stop reason: SDUPTHER

## 2018-12-10 RX ORDER — GRANULES FOR ORAL 3 G/1
3 POWDER ORAL DAILY
Qty: 9 G | Refills: 0 | Status: SHIPPED | OUTPATIENT
Start: 2018-12-10 | End: 2018-12-11

## 2018-12-11 ENCOUNTER — TELEPHONE (OUTPATIENT)
Dept: UROLOGY | Facility: CLINIC | Age: 63
End: 2018-12-11

## 2018-12-11 ENCOUNTER — HOSPITAL ENCOUNTER (EMERGENCY)
Facility: HOSPITAL | Age: 63
Discharge: HOME | End: 2018-12-11
Attending: EMERGENCY MEDICINE
Payer: MEDICARE

## 2018-12-11 VITALS
DIASTOLIC BLOOD PRESSURE: 57 MMHG | WEIGHT: 165 LBS | HEART RATE: 50 BPM | RESPIRATION RATE: 14 BRPM | TEMPERATURE: 98 F | SYSTOLIC BLOOD PRESSURE: 109 MMHG | HEIGHT: 64 IN | OXYGEN SATURATION: 97 % | BODY MASS INDEX: 28.17 KG/M2

## 2018-12-11 DIAGNOSIS — M79.89 LEG SWELLING: Primary | ICD-10-CM

## 2018-12-11 DIAGNOSIS — R07.89 CHEST DISCOMFORT: ICD-10-CM

## 2018-12-11 DIAGNOSIS — T83.511D URINARY TRACT INFECTION ASSOCIATED WITH INDWELLING URETHRAL CATHETER, SUBSEQUENT ENCOUNTER: ICD-10-CM

## 2018-12-11 DIAGNOSIS — N39.0 URINARY TRACT INFECTION ASSOCIATED WITH INDWELLING URETHRAL CATHETER, SUBSEQUENT ENCOUNTER: ICD-10-CM

## 2018-12-11 LAB
ALBUMIN SERPL BCP-MCNC: 4.1 G/DL
ALP SERPL-CCNC: 94 U/L
ALT SERPL W/O P-5'-P-CCNC: 11 U/L
ANION GAP SERPL CALC-SCNC: 10 MMOL/L
AST SERPL-CCNC: 18 U/L
BACTERIA #/AREA URNS HPF: NORMAL /HPF
BASOPHILS # BLD AUTO: 0.01 K/UL
BASOPHILS NFR BLD: 0.2 %
BILIRUB SERPL-MCNC: 0.6 MG/DL
BILIRUB UR QL STRIP: NEGATIVE
BNP SERPL-MCNC: 69 PG/ML
BUN SERPL-MCNC: 14 MG/DL
CALCIUM SERPL-MCNC: 9.7 MG/DL
CHLORIDE SERPL-SCNC: 96 MMOL/L
CLARITY UR: CLEAR
CO2 SERPL-SCNC: 32 MMOL/L
COLOR UR: YELLOW
CREAT SERPL-MCNC: 1 MG/DL
CRP SERPL-MCNC: 1.4 MG/L
DIFFERENTIAL METHOD: ABNORMAL
EOSINOPHIL # BLD AUTO: 0.1 K/UL
EOSINOPHIL NFR BLD: 1.8 %
ERYTHROCYTE [DISTWIDTH] IN BLOOD BY AUTOMATED COUNT: 13.7 %
EST. GFR  (AFRICAN AMERICAN): >60 ML/MIN/1.73 M^2
EST. GFR  (NON AFRICAN AMERICAN): >60 ML/MIN/1.73 M^2
GLUCOSE SERPL-MCNC: 94 MG/DL
GLUCOSE UR QL STRIP: NEGATIVE
HCT VFR BLD AUTO: 32.2 %
HGB BLD-MCNC: 10.5 G/DL
HGB UR QL STRIP: NEGATIVE
KETONES UR QL STRIP: NEGATIVE
LEUKOCYTE ESTERASE UR QL STRIP: ABNORMAL
LYMPHOCYTES # BLD AUTO: 1.7 K/UL
LYMPHOCYTES NFR BLD: 29.2 %
MCH RBC QN AUTO: 28.4 PG
MCHC RBC AUTO-ENTMCNC: 32.6 G/DL
MCV RBC AUTO: 87 FL
MICROSCOPIC COMMENT: NORMAL
MONOCYTES # BLD AUTO: 0.5 K/UL
MONOCYTES NFR BLD: 9.4 %
NEUTROPHILS # BLD AUTO: 3.4 K/UL
NEUTROPHILS NFR BLD: 59.2 %
NITRITE UR QL STRIP: POSITIVE
PH UR STRIP: 7 [PH] (ref 5–8)
PLATELET # BLD AUTO: 189 K/UL
PMV BLD AUTO: 9.8 FL
POTASSIUM SERPL-SCNC: 3.3 MMOL/L
PROT SERPL-MCNC: 6.9 G/DL
PROT UR QL STRIP: NEGATIVE
RBC # BLD AUTO: 3.7 M/UL
RBC #/AREA URNS HPF: 0 /HPF (ref 0–4)
SODIUM SERPL-SCNC: 138 MMOL/L
SP GR UR STRIP: <=1.005 (ref 1–1.03)
SQUAMOUS #/AREA URNS HPF: 0 /HPF
T4 FREE SERPL-MCNC: 1.22 NG/DL
TROPONIN I SERPL DL<=0.01 NG/ML-MCNC: <0.006 NG/ML
TSH SERPL DL<=0.005 MIU/L-ACNC: 6.62 UIU/ML
URN SPEC COLLECT METH UR: ABNORMAL
UROBILINOGEN UR STRIP-ACNC: NEGATIVE EU/DL
WBC # BLD AUTO: 5.65 K/UL
WBC #/AREA URNS HPF: 1 /HPF (ref 0–5)

## 2018-12-11 PROCEDURE — 86140 C-REACTIVE PROTEIN: CPT | Mod: HCNC

## 2018-12-11 PROCEDURE — 84484 ASSAY OF TROPONIN QUANT: CPT | Mod: HCNC

## 2018-12-11 PROCEDURE — 96374 THER/PROPH/DIAG INJ IV PUSH: CPT | Mod: HCNC

## 2018-12-11 PROCEDURE — 25000003 PHARM REV CODE 250: Mod: HCNC | Performed by: PHYSICIAN ASSISTANT

## 2018-12-11 PROCEDURE — 63600175 PHARM REV CODE 636 W HCPCS: Mod: HCNC | Performed by: EMERGENCY MEDICINE

## 2018-12-11 PROCEDURE — 81000 URINALYSIS NONAUTO W/SCOPE: CPT | Mod: HCNC

## 2018-12-11 PROCEDURE — 80053 COMPREHEN METABOLIC PANEL: CPT | Mod: HCNC

## 2018-12-11 PROCEDURE — 83880 ASSAY OF NATRIURETIC PEPTIDE: CPT | Mod: HCNC

## 2018-12-11 PROCEDURE — 93010 ELECTROCARDIOGRAM REPORT: CPT | Mod: ,,, | Performed by: INTERNAL MEDICINE

## 2018-12-11 PROCEDURE — 99285 EMERGENCY DEPT VISIT HI MDM: CPT | Mod: 25,HCNC

## 2018-12-11 PROCEDURE — 85025 COMPLETE CBC W/AUTO DIFF WBC: CPT | Mod: HCNC

## 2018-12-11 PROCEDURE — 84443 ASSAY THYROID STIM HORMONE: CPT | Mod: HCNC

## 2018-12-11 PROCEDURE — 93005 ELECTROCARDIOGRAM TRACING: CPT | Mod: HCNC

## 2018-12-11 PROCEDURE — 84439 ASSAY OF FREE THYROXINE: CPT | Mod: HCNC

## 2018-12-11 PROCEDURE — 96375 TX/PRO/DX INJ NEW DRUG ADDON: CPT | Mod: HCNC

## 2018-12-11 RX ORDER — ONDANSETRON 2 MG/ML
4 INJECTION INTRAMUSCULAR; INTRAVENOUS
Status: COMPLETED | OUTPATIENT
Start: 2018-12-11 | End: 2018-12-11

## 2018-12-11 RX ORDER — NITROFURANTOIN 25; 75 MG/1; MG/1
100 CAPSULE ORAL 2 TIMES DAILY
Qty: 10 CAPSULE | Refills: 0 | Status: SHIPPED | OUTPATIENT
Start: 2018-12-11 | End: 2018-12-16

## 2018-12-11 RX ORDER — HYDROMORPHONE HYDROCHLORIDE 1 MG/ML
0.5 INJECTION, SOLUTION INTRAMUSCULAR; INTRAVENOUS; SUBCUTANEOUS
Status: COMPLETED | OUTPATIENT
Start: 2018-12-11 | End: 2018-12-11

## 2018-12-11 RX ORDER — ORPHENADRINE CITRATE 100 MG/1
100 TABLET, EXTENDED RELEASE ORAL 2 TIMES DAILY
Status: DISCONTINUED | OUTPATIENT
Start: 2018-12-11 | End: 2018-12-12 | Stop reason: HOSPADM

## 2018-12-11 RX ADMIN — ONDANSETRON 4 MG: 2 INJECTION INTRAMUSCULAR; INTRAVENOUS at 09:12

## 2018-12-11 RX ADMIN — HYDROMORPHONE HYDROCHLORIDE 0.5 MG: 1 INJECTION, SOLUTION INTRAMUSCULAR; INTRAVENOUS; SUBCUTANEOUS at 09:12

## 2018-12-11 RX ADMIN — ORPHENADRINE CITRATE 100 MG: 100 TABLET, EXTENDED RELEASE ORAL at 08:12

## 2018-12-11 NOTE — TELEPHONE ENCOUNTER
----- Message from Kalina Li sent at 12/11/2018  3:42 PM CST -----  Contact: self [phone]  Needs Advice    Reason for call: Pt called inquiring if Dr. Mayo can prescribe another medication for her. She states the Monurol cost $200 and she cannot afford that         Communication Preference: self [phone]    Additional Information:

## 2018-12-11 NOTE — TELEPHONE ENCOUNTER
I spoke to the home health nurse.  She has a unilateral swollen leg and chest discomfort.  I recommended that she go to the emergency room immediately.    The home health nurse is going to call and talk to the patient now.    D

## 2018-12-11 NOTE — TELEPHONE ENCOUNTER
----- Message from Christina Pillai sent at 12/11/2018  3:52 PM CST -----  Contact: Brittany So [phone]  HOME is calling because patient has increased swelling after visit on Thursday. Patient also is complaining of chest pains. Patient things its her nerves. Patient'sHBP 118/72 heart rate normal, walking and talking. Patient is orientated. Should her Furosamide be increased to help with the fluid. Currently on 80mg. Should it be increased 20-40 a day for the next couple of days.    Please call and advise  Thank you.

## 2018-12-11 NOTE — TELEPHONE ENCOUNTER
Spoke w/ Saray ( nurse). States pt cannot afford Monurol, as it costs family over $230. Asking if anything other can be prescribed.     FYI.. States pt c/o chest pain and PCP was notified. Advised pt to go to ER for evaluation.

## 2018-12-11 NOTE — TELEPHONE ENCOUNTER
----- Message from Elida Cummings sent at 12/11/2018  4:10 PM CST -----  Contact: MichelleMunirmac with [facility]# [phone]  Needs Advice    Reason for call:she states that pt insurance is not covering medication. She states that pt can not afford medication and she wants to speak with nurse        Communication Preference:call  Additional Information:

## 2018-12-12 NOTE — ED PROVIDER NOTES
Encounter Date: 12/11/2018       History     Chief Complaint   Patient presents with    Leg Swelling     bilateral leg swelling since this morning with shortness of breath.     62 yo female with PMH of chronic pain, lumbar radiculopathy, hypothyroid, depression, anxiety, sever chronic bilateral lower extremity edema presents to the Ed with complaints of lower extremity edema and chest discomfort x 2 days. Per chart review the patients home health nurse called her PCP on 12/10 with these complaints and she was advised to come to the ED, but the patient declines states that she is ok and its just anxiety. She states that she has a UTI and can not afford the medication her urologist prescribed. She states the urologist instructed her to come to the ED for Iv antibiotics, but per chart review that was not communicated to the patient. She denies chest pain at this time, shortness of breath, abdominal pain, fever, chills, urine decrease, hematuria. Patient has indwelling suprapubic catheter, last changed on 12/10/18 by home health nurse.      The history is provided by the patient. No  was used.     Review of patient's allergies indicates:   Allergen Reactions    (d)-limonene flavor      Other reaction(s): difficult intubation  Other reaction(s): Difficulty breathing    Bactrim [sulfamethoxazole-trimethoprim] Anaphylaxis    Benadryl [diphenhydramine hcl] Shortness Of Breath    Imitrex [sumatriptan succinate] Shortness Of Breath    Penicillins Shortness Of Breath     Other reaction(s): Jittery    Topamax [topiramate] Shortness Of Breath    Vancomycin Shortness Of Breath     Rash    Butorphanol tartrate     Darvocet a500 [propoxyphene n-acetaminophen]      Other reaction(s): Difficulty breathing    Fentanyl      Other reaction(s): Vomiting  Other reaction(s): Nausea  Other reaction(s): Itching  swelling    White petrolatum-zinc     Zinc oxide-white petrolatum      Other reaction(s):  Difficulty breathing    Latex, natural rubber Itching and Rash    Phenytoin Rash and Other (See Comments)     Trouble breathing     Past Medical History:   Diagnosis Date    Anticoagulant long-term use     Anxiety     Arthritis     Bilateral lower extremity edema     severe chronic    Carotid artery occlusion     Cataract     Depression     Fever blister     Hypothyroid     Iron deficiency anemia     Lumbar radiculopathy     with chronic pain    Ocular migraine     Sleep apnea     cpap     Past Surgical History:   Procedure Laterality Date    ADENOIDECTOMY      BACK SURGERY      x 12    BIOPSY-BLADDER N/A 3/20/2018    Performed by [facility]en [facility] at [facility] OR 1ST FLR    BIOPSY-BLADDER N/A 1/26/2017    Performed by [facility]en [facility] at [facility] OR 1ST FLR    CATARACT EXTRACTION W/  INTRAOCULAR LENS IMPLANT Left     Dr Coleman     CYSTOSCOPY N/A 3/20/2018    Performed by [facility]en [facility] MD at [facility] OR 1ST FLR    CYSTOSCOPY N/A 1/26/2017    Performed by [facility] MD at [facility] OR 1ST FLR    CYSTOSCOPY N/A 11/8/2016    Performed by [facility]en Mayo MD at [facility] OR 2ND FLR    DISCECTOMY, SPINE, CERVICAL, ANTERIOR APPROACH, WITH FUSION N/A 5/13/2013    Performed by Larry Martinez MD at [facility] OR 2ND FLR    ESOPHAGOGASTRODUODENOSCOPY N/A 5/23/2018    Procedure: ESOPHAGOGASTRODUODENOSCOPY (EGD);  Surgeon: Prince Vance MD;  Location: [facility] (4TH FLR);  Service: Endoscopy;  Laterality: N/A;  r/s 'd per Dr. Vance due to family emergency- ER    ESOPHAGOGASTRODUODENOSCOPY (EGD) N/A 5/23/2018    Performed by Prince Vance MD at [facility] ENDO (4TH FLR)    ESOPHAGOGASTRODUODENOSCOPY (EGD) N/A 7/21/2017    Performed by Prince Vance MD at [facility] ENDO (4TH FLR)    ESOPHAGOGASTRODUODENOSCOPY (EGD) w/ dilation N/A 8/28/2017    Performed by Prince Vance MD at [facility] (4TH FLR)    FULGURATION-BLADDER N/A 1/26/2017    Performed by Shireen Mayo MD at [facility] OR [address] [place] [address]    HEART  CATH-LEFT Left 1/7/2016    Performed by Alberto Gee [facility] at [facility] CATH LAB    HYSTERECTOMY  1975    endometriosis    INJECTION-BOTOX N/A 3/20/2018    Performed by [facility] at [facility] OR 1ST FLR    INJECTION-BOTOX N/A 1/26/2017    Performed by [facility] MD at [facility] OR 1ST FLR    INJECTION-BULKING AGENT URETHRAL  OUTLET N/A 3/20/2018    Performed by [facility]en [facility] MD at [facility] OR 1ST FLR    INSERTION-INTRAOCULAR LENS (IOL) Left 11/13/2014    Performed by Sanjana Coleman MD at [facility] OR 1ST FLR    INSERTION-SUPRAPUBIC TUBE-OPEN N/A 11/8/2016    Performed by [facility] at [facility] OR 2ND FLR    MANOMETRY-ESOPHAGEAL-WITH IMPEDANCE N/A 4/23/2018    Performed by Robert Menendez MD at [facility] ENDO (4TH FLR)    MYELOGRAM N/A 2/19/2013    Performed by [facility] Diagnostic Provider at [facility] OR 2ND FLR    pain pump placement      SQ Dilaudid Pump managed by Dr. Hillman, Pain Management    PHACOEMULSIFICATION-ASPIRATION-CATARACT Left 11/13/2014    Performed by [facility]ana Coleman MD at [facility] OR 1ST FLR    LA UPPER GI ENDOSCOPY,DIAGNOSIS [92299 (CPT®)] N/A 7/16/2014    Performed by Prince Vance MD at [facility] ENDO (4TH FLR)    SPINAL CORD STIMULATOR REMOVAL      before Larissa    SPINE SURGERY  5-13-13    CERVICAL FUSION    TONSILLECTOMY       Family History   Problem Relation Age of Onset    Cancer Mother 55        breast    Cancer Father         esophagus,had laryngectomy    Esophageal cancer Father     Parkinsonism Maternal Grandmother     Tremor Maternal Grandmother     No Known Problems Brother     No Known Problems Brother     Heart disease Maternal Uncle     No Known Problems Sister     No Known Problems Maternal Aunt     No Known Problems Paternal Aunt     No Known Problems Paternal Uncle     No Known Problems Maternal Grandfather     No Known Problems Paternal Grandmother     No Known Problems Paternal Grandfather     Melanoma Neg Hx     Amblyopia Neg Hx     Blindness Neg Hx      Cataracts Neg Hx     Diabetes Neg Hx     Glaucoma Neg Hx     Hypertension Neg Hx     Macular degeneration Neg Hx     Retinal detachment Neg Hx     Strabismus Neg Hx     Stroke Neg Hx     Thyroid disease Neg Hx     Colon cancer Neg Hx      Social History     Tobacco Use    Smoking status: Never Smoker    Smokeless tobacco: Never Used   Substance Use Topics    Alcohol use: Yes    Drug use: No     Review of Systems   Constitutional: Negative for chills and fever.   Respiratory: Negative for shortness of breath.    Cardiovascular: Positive for leg swelling. Negative for chest pain.   Gastrointestinal: Negative for abdominal pain, nausea and vomiting.   Genitourinary: Negative for decreased urine volume and hematuria.   Musculoskeletal: Positive for back pain (chronic).   All other systems reviewed and are negative.      Physical Exam     Initial Vitals [12/11/18 1858]   BP Pulse Resp Temp SpO2   (!) 109/55 60 16 97.8 °F (36.6 °C) 99 %      MAP       --         Physical Exam    Nursing note and vitals reviewed.  Constitutional: Vital signs are normal. She appears well-developed and well-nourished. No distress.   HENT:   Head: Normocephalic and atraumatic.   Nose: Nose normal.   Eyes: Conjunctivae and lids are normal.   Neck: Normal range of motion and phonation normal.   Cardiovascular: Normal rate, regular rhythm, normal heart sounds and intact distal pulses.   2+ non pitting edema to bilateral lower extremities to level of the knee     Pulmonary/Chest: Effort normal and breath sounds normal. She exhibits no tenderness and no bony tenderness.   Abdominal: Soft. Normal appearance and bowel sounds are normal. There is no tenderness.       Musculoskeletal: Normal range of motion.   Neurological: She is alert and oriented to person, place, and time.   Skin: Skin is warm and dry.   Psychiatric: She has a normal mood and affect. Her speech is normal and behavior is normal. Judgment and thought content normal.  Cognition and memory are normal.         ED Course   Procedures  Labs Reviewed   CBC W/ AUTO DIFFERENTIAL - Abnormal; Notable for the following components:       Result Value    RBC 3.70 (*)     Hemoglobin 10.5 (*)     Hematocrit 32.2 (*)     All other components within normal limits   COMPREHENSIVE METABOLIC PANEL - Abnormal; Notable for the following components:    Potassium 3.3 (*)     CO2 32 (*)     All other components within normal limits   URINALYSIS, REFLEX TO URINE CULTURE - Abnormal; Notable for the following components:    Specific Gravity, UA <=1.005 (*)     Nitrite, UA Positive (*)     Leukocytes, UA Trace (*)     All other components within normal limits    Narrative:     Preferred Collection Type->Urine, Clean Catch   TSH - Abnormal; Notable for the following components:    TSH 6.622 (*)     All other components within normal limits   TROPONIN I   B-TYPE NATRIURETIC PEPTIDE   URINALYSIS MICROSCOPIC    Narrative:     Preferred Collection Type->Urine, Clean Catch   C-REACTIVE PROTEIN   SEDIMENTATION RATE   C-REACTIVE PROTEIN   T4, FREE          Imaging Results          US Lower Extremity Veins Bilateral (Final result)  Result time 12/11/18 21:46:52    Final result by Simone Rivera MD (12/11/18 21:46:52)                 Impression:      No evidence of deep venous thrombosis in either lower extremity.      Electronically signed by: Simone Rivera MD  Date:    12/11/2018  Time:    21:46             Narrative:    EXAMINATION:  US LOWER EXTREMITY VEINS BILATERAL    CLINICAL HISTORY:  Pain in right leg    TECHNIQUE:  Duplex and color flow Doppler and dynamic compression was performed of the bilateral lower extremity veins was performed.    COMPARISON:  04/21/2018    FINDINGS:  Duplex and color flow Doppler evaluation does not reveal any evidence of acute venous thrombosis in the common femoral, superficial femoral, greater saphenous, popliteal, peroneal, anterior tibial and posterior tibial veins  bilaterally.  There is no reflux to suggest valvular incompetence.                               X-Ray Chest AP Portable (Final result)  Result time 12/11/18 19:35:24    Final result by Simone Rivera MD (12/11/18 19:35:24)                 Impression:      1. No acute cardiopulmonary process.      Electronically signed by: Simone Rivera MD  Date:    12/11/2018  Time:    19:35             Narrative:    EXAMINATION:  XR CHEST AP PORTABLE    CLINICAL HISTORY:  Shortness of breath    TECHNIQUE:  Single frontal view of the chest was performed.    COMPARISON:  10/02/2018    FINDINGS:  The cardiomediastinal silhouette is prominent, similar to the previous exam noting calcification of the aorta..  There is no pleural effusion.  The trachea is midline.  The lungs are symmetrically expanded bilaterally with mildly coarse interstitial attenuation.  No large focal consolidation seen.  There is no pneumothorax.  The osseous structures are remarkable for degenerative changes, noting postsurgical changes of the cervical spine.  There is a spinal stimulator..                                 Medical Decision Making:   Initial Assessment:   62 yo female with chest discomfort and bilateral lower extremity edema x 2 days. Chest discomfort has resolved prior to arriving in ED. 2+ non pitting edema in bilateral lower extremities to the level of the knee. NAD, EBBS. Slightly bradycardic and hypotensive. Patient is on a dilaudid pain pump and Vicodin for chronic pain. Patient has a diagnosis of chronic severe bilateral lower extremity edema in her chart.   Differential Diagnosis:   Chronic edema, DVT, CHF, MI, PE, UTI  Clinical Tests:   Lab Tests: Ordered and Reviewed  Radiological Study: Ordered and Reviewed  Medical Tests: Ordered and Reviewed  ED Management:  UA with nitrites, no bacteria or WBCs. Patient has a history of UTIs. Other labs consistent with patients baseline. BNP and Troponin negative. US and X-ray negative. Due to  multiple medication allergies, patient will be discharged with rx for Macrobid and instructed to follow up with her Urologist as soon as possible. Return precautions given. Patient states understanding and agreement with treatment plan.                    ED Course as of Dec 12 0128   Tue Dec 11, 2018   0770 Sort note: Tasha Hawley nontoxic/afebrile 63 y.o.  presented to the ED with c/o decreased HR at 47 per her  nurse.  Per chart review,  called on 12/10/18 to her internal medicine physician because patient has she has a unilateral swollen leg and chest discomfort.  Has h/o of frequent UTIs.       Patient seen and medically screened by Physician assistant in Sort process due to ED crowding.  Appropriate tests and/or medications ordered.  Care transferred to an alternate provider when patient was placed in an Exam Room from the [facility] for physical exam, additional orders, and disposition. AHM    [AM]   1939 EKG with sinus bradycardia, rate of 52 bpm.  TWI in lead V1.  No STEMI  [LD]      ED Course User Index  [AM] Lisa Owusu PA-C  [LD] Tracey Gill MD     Clinical Impression:   The primary encounter diagnosis was Leg swelling. Diagnoses of Chest discomfort and Urinary tract infection associated with indwelling urethral catheter, subsequent encounter were also pertinent to this visit.                             Nathalie Paz PA-C  12/12/18 6015       Tracey Gill MD  12/12/18 2009

## 2018-12-12 NOTE — ED NOTES
APPEARANCE: Alert, oriented, no acute distress noted  CARDIAC: Normal sinus rhythm noted on CR monitor, HR and BP WNL. Denies chest pain  PERIPHERAL VASCULAR: peripheral pulses +2 and present x4 extremities. Cap refill <3 seconds. +2 non-pitting edema noted to BLE.  RESPIRATORY:Normal rate and effort, breath sounds clear bilaterally throughout chest. Respirations are equal and unlabored no obvious signs of distress.  GASTRO: soft, bowel sounds normal, no tenderness, no abdominal distention.  G/U: suprapubic cath noted, site with small amount of yellow drainage at insertion site, mild redness noted to site. Cath secured  MUSC: Full ROM x4 extremities. No obvious deformity, pt ambulated with walker  SKIN: Skin is warm and dry, normal skin turgor, mucous membranes moist.  NEURO:  GCS 15

## 2018-12-13 ENCOUNTER — TELEPHONE (OUTPATIENT)
Dept: UROLOGY | Facility: CLINIC | Age: 63
End: 2018-12-13

## 2018-12-13 NOTE — TELEPHONE ENCOUNTER
----- Message from Sis Calixto sent at 12/13/2018  1:53 PM CST -----  Contact: rodrigo (home health nurse): [phone]  Needs Advice    Reason for call: pt's home health nurse stated the pt went to the ER per Dr. Mayo for a UTI and the infection was not treated and would like to speak with Dr. Mayo or julissa        Communication Preference: rodrigo (home health nurse): [phone]

## 2018-12-17 ENCOUNTER — TELEPHONE (OUTPATIENT)
Dept: UROLOGY | Facility: CLINIC | Age: 63
End: 2018-12-17

## 2018-12-17 NOTE — TELEPHONE ENCOUNTER
Called HH nurse (Saray), gave v/o for bladder irrigations w/ 240mg Gentamicin in 500 mL NS for at least 7 days. Per HH nurse, the doctor has to order medication and supplies for irrigations. Asking that order be faxed to [phone] in hopes of starting tomorrow (12/18/2018). Asking that medication be ordered through [facility] if possible.

## 2018-12-17 NOTE — LETTER
December 17, 2018    Tasha Hawley  [address]  [place] [address]             [facility] 4th Floor  [address]  [facility] [address]  Phone: [phone] To Whom it May Concern    Please mix 240 mg Gentamicin in 500 ml sterile normal saline for irrigation.      Please inject 120 ml with a catheter tipped syring into the bladder through the suprapubic tube.    Remove 60 ml  Inject another 60 ml  Inject 70 ml  Remove 60 ml.      Clamp the catheter and allow the medication to stay 1 hour, then release and put the suprapubic tube to drainage.    Please repeat daily for 7 days.  Can store the extra 250 ml at room temperature for use the following day.     If you have any questions or concerns, please don't hesitate to call.    Sincerely,        Shireen Mayo MD

## 2018-12-17 NOTE — TELEPHONE ENCOUNTER
[facility] at [phone]. Order faxed to  at number provided by  nurse (Saray).

## 2018-12-17 NOTE — TELEPHONE ENCOUNTER
----- Message from Ailyn Rodriguez LPN sent at 12/14/2018  4:15 PM CST -----  Contact: self [phone] or [phone]      ----- Message -----  From: Kalina Li  Sent: 12/14/2018  11:17 AM  To: [facility] Staff    Needs Advice    Reason for call: Pt called in regarding her infection she states she is having a UTI infection and she is bleeding        Communication Preference: self [phone] or [phone]    Additional Information:

## 2018-12-18 NOTE — TELEPHONE ENCOUNTER
----- Message from Susannah Whittaker RN sent at 12/14/2018  5:10 PM CST -----  Contact: pt: [phone]      ----- Message -----  From: Sis Calixto  Sent: 12/14/2018   2:34 PM  To: [facility] Staff    Needs Advice    Reason for call: pt stated she would like to speak with Dr. Mayo as soon as possible re a UTI         Communication Preference: pt: [phone]

## 2018-12-27 ENCOUNTER — TELEPHONE (OUTPATIENT)
Dept: UROLOGY | Facility: CLINIC | Age: 63
End: 2018-12-27

## 2018-12-27 NOTE — TELEPHONE ENCOUNTER
"Spoke with pt and she states that her home health nurse came and did a 7 day bladder wash and she is still experiencing burning and leaking. Informed pt that I would send msg to provider available, will get back with her and that Dr. Mayo is out this week. Pt verbalized understanding.        ----- Message from Susannah Whittaker RN sent at 12/26/2018  5:20 PM CST -----  Contact: [phone]      ----- Message -----  From: Karoline Land MA  Sent: 12/26/2018  11:31 AM  To: [facility] Staff    Needs Advice    Reason for call: pt states that she has been doing "bladder treatments" for 7 days but still having discomfort.         Communication Preference: [phone]    Additional Information: please  call       "

## 2018-12-28 ENCOUNTER — TELEPHONE (OUTPATIENT)
Dept: UROLOGY | Facility: CLINIC | Age: 63
End: 2018-12-28

## 2018-12-28 ENCOUNTER — HOSPITAL ENCOUNTER (OUTPATIENT)
Facility: HOSPITAL | Age: 63
Discharge: HOME | End: 2018-12-31
Attending: UROLOGY | Admitting: UROLOGY
Payer: MEDICARE

## 2018-12-28 DIAGNOSIS — N39.0 URINARY TRACT INFECTION: ICD-10-CM

## 2018-12-28 DIAGNOSIS — R07.9 CHEST PAIN: ICD-10-CM

## 2018-12-28 LAB
ANION GAP SERPL CALC-SCNC: 10 MMOL/L
BASOPHILS # BLD AUTO: 0.02 K/UL
BASOPHILS NFR BLD: 0.3 %
BUN SERPL-MCNC: 12 MG/DL
CALCIUM SERPL-MCNC: 9.9 MG/DL
CHLORIDE SERPL-SCNC: 100 MMOL/L
CO2 SERPL-SCNC: 33 MMOL/L
CREAT SERPL-MCNC: 1 MG/DL
DIFFERENTIAL METHOD: NORMAL
EOSINOPHIL # BLD AUTO: 0.2 K/UL
EOSINOPHIL NFR BLD: 2.7 %
ERYTHROCYTE [DISTWIDTH] IN BLOOD BY AUTOMATED COUNT: 14.1 %
EST. GFR  (AFRICAN AMERICAN): >60 ML/MIN/1.73 M^2
EST. GFR  (NON AFRICAN AMERICAN): >60 ML/MIN/1.73 M^2
GLUCOSE SERPL-MCNC: 91 MG/DL
HCT VFR BLD AUTO: 38.1 %
HGB BLD-MCNC: 12.3 G/DL
IMM GRANULOCYTES # BLD AUTO: 0.01 K/UL
IMM GRANULOCYTES NFR BLD AUTO: 0.2 %
LYMPHOCYTES # BLD AUTO: 1.5 K/UL
LYMPHOCYTES NFR BLD: 24.2 %
MAGNESIUM SERPL-MCNC: 2.2 MG/DL
MCH RBC QN AUTO: 28.1 PG
MCHC RBC AUTO-ENTMCNC: 32.3 G/DL
MCV RBC AUTO: 87 FL
MONOCYTES # BLD AUTO: 0.5 K/UL
MONOCYTES NFR BLD: 7.4 %
NEUTROPHILS # BLD AUTO: 4.1 K/UL
NEUTROPHILS NFR BLD: 65.2 %
NRBC BLD-RTO: 0 /100 WBC
PHOSPHATE SERPL-MCNC: 3.3 MG/DL
PLATELET # BLD AUTO: 205 K/UL
PMV BLD AUTO: 9.7 FL
POTASSIUM SERPL-SCNC: 3.5 MMOL/L
RBC # BLD AUTO: 4.37 M/UL
SODIUM SERPL-SCNC: 143 MMOL/L
WBC # BLD AUTO: 6.21 K/UL

## 2018-12-28 PROCEDURE — G0378 HOSPITAL OBSERVATION PER HR: HCPCS | Mod: HCNC

## 2018-12-28 PROCEDURE — 84100 ASSAY OF PHOSPHORUS: CPT | Mod: HCNC

## 2018-12-28 PROCEDURE — 36415 COLL VENOUS BLD VENIPUNCTURE: CPT | Mod: HCNC

## 2018-12-28 PROCEDURE — 83735 ASSAY OF MAGNESIUM: CPT | Mod: HCNC

## 2018-12-28 PROCEDURE — 25000003 PHARM REV CODE 250: Mod: HCNC | Performed by: STUDENT IN AN ORGANIZED HEALTH CARE EDUCATION/TRAINING PROGRAM

## 2018-12-28 PROCEDURE — 80048 BASIC METABOLIC PNL TOTAL CA: CPT | Mod: HCNC

## 2018-12-28 PROCEDURE — G0379 DIRECT REFER HOSPITAL OBSERV: HCPCS | Mod: HCNC

## 2018-12-28 PROCEDURE — 85025 COMPLETE CBC W/AUTO DIFF WBC: CPT | Mod: HCNC

## 2018-12-28 RX ORDER — FUROSEMIDE 40 MG/1
40 TABLET ORAL 2 TIMES DAILY
Status: DISCONTINUED | OUTPATIENT
Start: 2018-12-28 | End: 2018-12-31 | Stop reason: HOSPADM

## 2018-12-28 RX ORDER — TRAZODONE HYDROCHLORIDE 100 MG/1
200 TABLET ORAL NIGHTLY
Status: DISCONTINUED | OUTPATIENT
Start: 2018-12-28 | End: 2018-12-31 | Stop reason: HOSPADM

## 2018-12-28 RX ORDER — SODIUM CHLORIDE 0.9 % (FLUSH) 0.9 %
3 SYRINGE (ML) INJECTION
Status: DISCONTINUED | OUTPATIENT
Start: 2018-12-28 | End: 2018-12-31 | Stop reason: HOSPADM

## 2018-12-28 RX ORDER — BUTALBITAL, ASPIRIN, AND CAFFEINE 325; 50; 40 MG/1; MG/1; MG/1
1 CAPSULE ORAL 3 TIMES DAILY PRN
Status: DISCONTINUED | OUTPATIENT
Start: 2018-12-28 | End: 2018-12-31 | Stop reason: HOSPADM

## 2018-12-28 RX ORDER — ASPIRIN 81 MG/1
81 TABLET ORAL DAILY
Status: DISCONTINUED | OUTPATIENT
Start: 2018-12-29 | End: 2018-12-31 | Stop reason: HOSPADM

## 2018-12-28 RX ORDER — QUETIAPINE FUMARATE 25 MG/1
100 TABLET, FILM COATED ORAL NIGHTLY
Status: DISCONTINUED | OUTPATIENT
Start: 2018-12-28 | End: 2018-12-28

## 2018-12-28 RX ORDER — PANTOPRAZOLE SODIUM 40 MG/1
40 TABLET, DELAYED RELEASE ORAL DAILY
Status: DISCONTINUED | OUTPATIENT
Start: 2018-12-29 | End: 2018-12-31 | Stop reason: HOSPADM

## 2018-12-28 RX ORDER — QUETIAPINE FUMARATE 25 MG/1
50 TABLET, FILM COATED ORAL DAILY
Status: DISCONTINUED | OUTPATIENT
Start: 2018-12-29 | End: 2018-12-28

## 2018-12-28 RX ORDER — ONDANSETRON 2 MG/ML
4 INJECTION INTRAMUSCULAR; INTRAVENOUS EVERY 6 HOURS PRN
Status: DISCONTINUED | OUTPATIENT
Start: 2018-12-28 | End: 2018-12-31 | Stop reason: HOSPADM

## 2018-12-28 RX ORDER — BUPROPION HYDROCHLORIDE 150 MG/1
300 TABLET ORAL DAILY
Status: DISCONTINUED | OUTPATIENT
Start: 2018-12-29 | End: 2018-12-31 | Stop reason: HOSPADM

## 2018-12-28 RX ORDER — SODIUM CHLORIDE 9 MG/ML
INJECTION, SOLUTION INTRAVENOUS CONTINUOUS
Status: DISCONTINUED | OUTPATIENT
Start: 2018-12-28 | End: 2018-12-28

## 2018-12-28 RX ORDER — LEVOTHYROXINE SODIUM 125 UG/1
125 TABLET ORAL
Status: DISCONTINUED | OUTPATIENT
Start: 2018-12-29 | End: 2018-12-31 | Stop reason: HOSPADM

## 2018-12-28 RX ORDER — ATORVASTATIN CALCIUM 20 MG/1
80 TABLET, FILM COATED ORAL DAILY
Status: DISCONTINUED | OUTPATIENT
Start: 2018-12-29 | End: 2018-12-31 | Stop reason: HOSPADM

## 2018-12-28 RX ORDER — FLUOXETINE HYDROCHLORIDE 20 MG/1
60 CAPSULE ORAL DAILY
Status: DISCONTINUED | OUTPATIENT
Start: 2018-12-29 | End: 2018-12-31 | Stop reason: HOSPADM

## 2018-12-28 RX ORDER — DIPHENHYDRAMINE HCL 25 MG
25 CAPSULE ORAL EVERY 12 HOURS PRN
Status: DISCONTINUED | OUTPATIENT
Start: 2018-12-28 | End: 2018-12-28

## 2018-12-28 RX ORDER — LIDOCAINE 50 MG/G
1 PATCH TOPICAL DAILY PRN
Status: DISCONTINUED | OUTPATIENT
Start: 2018-12-28 | End: 2018-12-31 | Stop reason: HOSPADM

## 2018-12-28 RX ORDER — HYDROCODONE BITARTRATE AND ACETAMINOPHEN 5; 325 MG/1; MG/1
1 TABLET ORAL EVERY 4 HOURS PRN
Status: DISCONTINUED | OUTPATIENT
Start: 2018-12-28 | End: 2018-12-29

## 2018-12-28 RX ORDER — TIZANIDINE 4 MG/1
4 TABLET ORAL EVERY 8 HOURS PRN
Status: DISCONTINUED | OUTPATIENT
Start: 2018-12-28 | End: 2018-12-31 | Stop reason: HOSPADM

## 2018-12-28 RX ORDER — SENNOSIDES 8.6 MG/1
2 TABLET ORAL NIGHTLY
Status: DISCONTINUED | OUTPATIENT
Start: 2018-12-28 | End: 2018-12-31 | Stop reason: HOSPADM

## 2018-12-28 RX ORDER — POTASSIUM CITRATE 10 MEQ/1
10 TABLET, EXTENDED RELEASE ORAL
Status: DISCONTINUED | OUTPATIENT
Start: 2018-12-29 | End: 2018-12-28

## 2018-12-28 RX ORDER — POTASSIUM CHLORIDE 20 MEQ/1
20 TABLET, EXTENDED RELEASE ORAL 2 TIMES DAILY
Status: DISCONTINUED | OUTPATIENT
Start: 2018-12-28 | End: 2018-12-31 | Stop reason: HOSPADM

## 2018-12-28 RX ORDER — QUETIAPINE FUMARATE 100 MG/1
200 TABLET, FILM COATED ORAL NIGHTLY
Status: DISCONTINUED | OUTPATIENT
Start: 2018-12-28 | End: 2018-12-31 | Stop reason: HOSPADM

## 2018-12-28 RX ORDER — DOCUSATE SODIUM 100 MG/1
100 CAPSULE, LIQUID FILLED ORAL DAILY
Status: DISCONTINUED | OUTPATIENT
Start: 2018-12-29 | End: 2018-12-31 | Stop reason: HOSPADM

## 2018-12-28 RX ADMIN — HYDROCODONE BITARTRATE AND ACETAMINOPHEN 1 TABLET: 5; 325 TABLET ORAL at 10:12

## 2018-12-28 RX ADMIN — FUROSEMIDE 40 MG: 40 TABLET ORAL at 10:12

## 2018-12-28 RX ADMIN — SENNOSIDES 2 TABLET: 8.6 TABLET, FILM COATED ORAL at 10:12

## 2018-12-28 RX ADMIN — QUETIAPINE FUMARATE 200 MG: 100 TABLET ORAL at 10:12

## 2018-12-28 RX ADMIN — TRAZODONE HYDROCHLORIDE 200 MG: 100 TABLET ORAL at 10:12

## 2018-12-28 RX ADMIN — POTASSIUM CHLORIDE 20 MEQ: 1500 TABLET, EXTENDED RELEASE ORAL at 10:12

## 2018-12-28 NOTE — TELEPHONE ENCOUNTER
"----- Message from Mariangel Silva MA sent at 12/28/2018  8:23 AM CST -----  Contact: [phone]      ----- Message -----  From: Mariangel Silva MA  Sent: 12/26/2018   4:13 PM  To: Mariangel Silva MA        ----- Message -----  From: Karoline Land MA  Sent: 12/26/2018  11:31 AM  To: [facility] Staff    Needs Advice    Reason for call: pt states that she has been doing "bladder treatments" for 7 days but still having discomfort.         Communication Preference: [phone]    Additional Information: please  call     "

## 2018-12-28 NOTE — TELEPHONE ENCOUNTER
Will have the patient come in to be admitted, we can change the catheter, send new culture and get her started on IV antibiotics.

## 2018-12-29 LAB
GENTAMICIN PEAK SERPL-MCNC: 15.2 UG/ML
GRAM STN SPEC: NORMAL
GRAM STN SPEC: NORMAL

## 2018-12-29 PROCEDURE — 87086 URINE CULTURE/COLONY COUNT: CPT | Mod: HCNC

## 2018-12-29 PROCEDURE — 25000003 PHARM REV CODE 250: Mod: HCNC | Performed by: STUDENT IN AN ORGANIZED HEALTH CARE EDUCATION/TRAINING PROGRAM

## 2018-12-29 PROCEDURE — A4216 STERILE WATER/SALINE, 10 ML: HCPCS | Mod: HCNC | Performed by: UROLOGY

## 2018-12-29 PROCEDURE — 76937 US GUIDE VASCULAR ACCESS: CPT | Mod: HCNC

## 2018-12-29 PROCEDURE — 87205 SMEAR GRAM STAIN: CPT | Mod: HCNC

## 2018-12-29 PROCEDURE — 87088 URINE BACTERIA CULTURE: CPT | Mod: HCNC

## 2018-12-29 PROCEDURE — 87186 SC STD MICRODIL/AGAR DIL: CPT | Mod: 59,HCNC

## 2018-12-29 PROCEDURE — 80170 ASSAY OF GENTAMICIN: CPT | Mod: HCNC

## 2018-12-29 PROCEDURE — G0378 HOSPITAL OBSERVATION PER HR: HCPCS | Mod: HCNC

## 2018-12-29 PROCEDURE — 36569 INSJ PICC 5 YR+ W/O IMAGING: CPT | Mod: HCNC

## 2018-12-29 PROCEDURE — 36415 COLL VENOUS BLD VENIPUNCTURE: CPT | Mod: HCNC

## 2018-12-29 PROCEDURE — C1751 CATH, INF, PER/CENT/MIDLINE: HCPCS | Mod: HCNC

## 2018-12-29 PROCEDURE — 87077 CULTURE AEROBIC IDENTIFY: CPT | Mod: 59,HCNC

## 2018-12-29 PROCEDURE — 63600175 PHARM REV CODE 636 W HCPCS: Mod: HCNC | Performed by: STUDENT IN AN ORGANIZED HEALTH CARE EDUCATION/TRAINING PROGRAM

## 2018-12-29 PROCEDURE — 25000003 PHARM REV CODE 250: Mod: HCNC | Performed by: UROLOGY

## 2018-12-29 RX ORDER — SODIUM CHLORIDE 0.9 % (FLUSH) 0.9 %
10 SYRINGE (ML) INJECTION EVERY 6 HOURS
Status: DISCONTINUED | OUTPATIENT
Start: 2018-12-29 | End: 2018-12-31 | Stop reason: HOSPADM

## 2018-12-29 RX ORDER — SODIUM CHLORIDE 0.9 % (FLUSH) 0.9 %
10 SYRINGE (ML) INJECTION
Status: DISCONTINUED | OUTPATIENT
Start: 2018-12-29 | End: 2018-12-31 | Stop reason: HOSPADM

## 2018-12-29 RX ORDER — HYDROCODONE BITARTRATE AND ACETAMINOPHEN 10; 325 MG/1; MG/1
1 TABLET ORAL ONCE
Status: COMPLETED | OUTPATIENT
Start: 2018-12-29 | End: 2018-12-29

## 2018-12-29 RX ORDER — HEPARIN SODIUM 5000 [USP'U]/ML
5000 INJECTION, SOLUTION INTRAVENOUS; SUBCUTANEOUS EVERY 8 HOURS
Status: DISCONTINUED | OUTPATIENT
Start: 2018-12-29 | End: 2018-12-31 | Stop reason: HOSPADM

## 2018-12-29 RX ORDER — HYDROCODONE BITARTRATE AND ACETAMINOPHEN 10; 325 MG/1; MG/1
1 TABLET ORAL EVERY 4 HOURS PRN
Status: DISCONTINUED | OUTPATIENT
Start: 2018-12-29 | End: 2018-12-31 | Stop reason: HOSPADM

## 2018-12-29 RX ADMIN — SENNOSIDES 2 TABLET: 8.6 TABLET, FILM COATED ORAL at 08:12

## 2018-12-29 RX ADMIN — GENTAMICIN SULFATE 375.2 MG: 40 INJECTION, SOLUTION INTRAMUSCULAR; INTRAVENOUS at 01:12

## 2018-12-29 RX ADMIN — HYDROCODONE BITARTRATE AND ACETAMINOPHEN 1 TABLET: 10; 325 TABLET ORAL at 10:12

## 2018-12-29 RX ADMIN — HYDROCODONE BITARTRATE AND ACETAMINOPHEN 1 TABLET: 5; 325 TABLET ORAL at 02:12

## 2018-12-29 RX ADMIN — Medication 10 ML: at 05:12

## 2018-12-29 RX ADMIN — QUETIAPINE FUMARATE 200 MG: 100 TABLET ORAL at 08:12

## 2018-12-29 RX ADMIN — HEPARIN SODIUM 5000 UNITS: 5000 INJECTION, SOLUTION INTRAVENOUS; SUBCUTANEOUS at 10:12

## 2018-12-29 RX ADMIN — ATORVASTATIN CALCIUM 80 MG: 20 TABLET, FILM COATED ORAL at 10:12

## 2018-12-29 RX ADMIN — Medication 1 CAPSULE: at 10:12

## 2018-12-29 RX ADMIN — POTASSIUM CHLORIDE 20 MEQ: 1500 TABLET, EXTENDED RELEASE ORAL at 08:12

## 2018-12-29 RX ADMIN — FUROSEMIDE 40 MG: 40 TABLET ORAL at 08:12

## 2018-12-29 RX ADMIN — HYDROCODONE BITARTRATE AND ACETAMINOPHEN 1 TABLET: 5; 325 TABLET ORAL at 10:12

## 2018-12-29 RX ADMIN — OXYBUTYNIN CHLORIDE 15 MG: 10 TABLET, FILM COATED, EXTENDED RELEASE ORAL at 10:12

## 2018-12-29 RX ADMIN — GENTAMICIN SULFATE 375.2 MG: 40 INJECTION, SOLUTION INTRAMUSCULAR; INTRAVENOUS at 10:12

## 2018-12-29 RX ADMIN — TRAZODONE HYDROCHLORIDE 200 MG: 100 TABLET ORAL at 08:12

## 2018-12-29 RX ADMIN — PANTOPRAZOLE SODIUM 40 MG: 40 TABLET, DELAYED RELEASE ORAL at 10:12

## 2018-12-29 RX ADMIN — FUROSEMIDE 40 MG: 40 TABLET ORAL at 10:12

## 2018-12-29 RX ADMIN — HEPARIN SODIUM 5000 UNITS: 5000 INJECTION, SOLUTION INTRAVENOUS; SUBCUTANEOUS at 01:12

## 2018-12-29 RX ADMIN — FLUOXETINE 60 MG: 20 CAPSULE ORAL at 10:12

## 2018-12-29 RX ADMIN — LEVOTHYROXINE SODIUM 125 MCG: 125 TABLET ORAL at 05:12

## 2018-12-29 RX ADMIN — HYDROCODONE BITARTRATE AND ACETAMINOPHEN 1 TABLET: 5; 325 TABLET ORAL at 05:12

## 2018-12-29 RX ADMIN — BUPROPION HYDROCHLORIDE 300 MG: 150 TABLET, EXTENDED RELEASE ORAL at 10:12

## 2018-12-29 RX ADMIN — TIZANIDINE 4 MG: 4 TABLET ORAL at 01:12

## 2018-12-29 RX ADMIN — HYDROCODONE BITARTRATE AND ACETAMINOPHEN 1 TABLET: 10; 325 TABLET ORAL at 05:12

## 2018-12-29 RX ADMIN — DOCUSATE SODIUM 100 MG: 100 CAPSULE, LIQUID FILLED ORAL at 10:12

## 2018-12-29 RX ADMIN — POTASSIUM CHLORIDE 20 MEQ: 1500 TABLET, EXTENDED RELEASE ORAL at 10:12

## 2018-12-29 RX ADMIN — ASPIRIN 81 MG: 81 TABLET, COATED ORAL at 10:12

## 2018-12-29 NOTE — HPI
Tasha Hawley is a 63 y.o. female with pmh of chronic pain, on chronic opiods, arthritis, anxiety, and a suprapubic catheter that was placed at the patients request for mixed incontinence and incomplete emptying     She had SUDS in 2016 which showed decreased sensation, urodynamic stress urinary incontinence, large bladder capacity, incomplete emptying. PVR was 650. She was managed with an indwelling catheter and then requested an SPT which was ultimately placed in 11/2016. She has been managing her bladder with the SPT since that time and has been seen multiple times for issues with drainage, sediment in the urine, and recurrent UTIs.      She has had multiple MDR UTIs in the past and has multiple severe allergies to antibiotics. She was recently set up for sensitivity testing but did not follow up. She has most recently been having gentamicin bladder washes with home health but is complaining of suprapubic pain and discomfort consistent with symptoms when she is having a UTI. Her last gentamicin bladder wash was on 12/24/18. No recent fevers or chills.    She was admitted for PICC line placement and initiation of IV antibiotics due to her resistance patterns and multiple severe antibiotic allergies as well as allergy sensitivity testing.

## 2018-12-29 NOTE — ASSESSMENT & PLAN NOTE
64 yo F with spt admitted for IV antibiotics and PICC placement.    -Admit to urology  -PICC line with PICC team today  -Social work consult for IV antibiotic infusions  -SPT exchanged, urine for culture pending  -Allergy consult for sensitivity testing  -Home meds

## 2018-12-29 NOTE — PROCEDURES
"Tasha Hawley is a 63 y.o. female patient.    Temp: 97.8 °F (36.6 °C) (12/29/18 0702)  Pulse: (!) 52 (12/29/18 0702)  Resp: 14 (12/29/18 0702)  BP: (!) 94/56 (12/29/18 0709)  SpO2: 95 % (12/29/18 0702)  Weight: 75 kg (165 lb 5.5 oz) (12/28/18 2055)  Height: 5' 4" (162.6 cm) (12/28/18 2003)    PICC  Date/Time: 12/29/2018 10:52 AM  Performed by: Mirna Matson RN  Assisting provider: Lisa Vuong LPN  Consent Done: Yes  Time out: Immediately prior to procedure a time out was called to verify the correct patient, procedure, equipment, support staff and site/side marked as required  Indications: med administration and vascular access  Anesthesia: local infiltration  Local anesthetic: lidocaine 1% without epinephrine  Anesthetic Total (mL): 2  Preparation: skin prepped with ChloraPrep  Skin prep agent dried: skin prep agent completely dried prior to procedure  Sterile barriers: all five maximum sterile barriers used - cap, mask, sterile gown, sterile gloves, and large sterile sheet  Hand hygiene: hand hygiene performed prior to central venous catheter insertion  Location details: right brachial  Catheter type: double lumen  Catheter size: 5 Fr  Catheter Length: 38cm    Ultrasound guidance: yes  Vessel Caliber: medium and patent, compressibility normal  Vascular Doppler: not done  Needle advanced into vessel with real time Ultrasound guidance.  Guidewire confirmed in vessel.  Image recorded and saved.  Sterile sheath used.  Number of attempts: 1  Post-procedure: blood return through all ports, chlorhexidine patch and sterile dressing applied  Technical procedures used: 3cg  Specimens: No  Implants: No  Assessment: placement verified by x-ray  Complications: none          Lisa Vuong  12/29/2018  "

## 2018-12-29 NOTE — PLAN OF CARE
Problem: Adult Inpatient Plan of Care  Goal: Plan of Care Review  Outcome: Ongoing (interventions implemented as appropriate)  Pt aaox4, vs stable, no falls or injuries. Fall precautions in place w/ personal items near by. Pain level being monitor and control by medication. No signs of distress. Call light in reach; will continue to monitor pt.

## 2018-12-29 NOTE — SUBJECTIVE & OBJECTIVE
Past Medical History:   Diagnosis Date    Anticoagulant long-term use     Anxiety     Arthritis     Bilateral lower extremity edema     severe chronic    Carotid artery occlusion     Cataract     Depression     Fever blister     Hypothyroid     Iron deficiency anemia     Lumbar radiculopathy     with chronic pain    Ocular migraine     Sleep apnea     cpap       Past Surgical History:   Procedure Laterality Date    ADENOIDECTOMY      BACK SURGERY      x 12    BIOPSY-BLADDER N/A 3/20/2018    Performed by Shireen Mayo MD at [facility] OR 1ST FLR    BIOPSY-BLADDER N/A 1/26/2017    Performed by [facility]en [facility] MD at [facility] OR 1ST FLR    CATARACT EXTRACTION W/  INTRAOCULAR LENS IMPLANT Left     Dr Coleman     CYSTOSCOPY N/A 3/20/2018    Performed by [facility]en [facility] MD at [facility] OR 1ST FLR    CYSTOSCOPY N/A 1/26/2017    Performed by [facility]en [facility] MD at [facility] OR 1ST FLR    CYSTOSCOPY N/A 11/8/2016    Performed by Shireen Mayo MD at [facility] OR 2ND FLR    DISCECTOMY, SPINE, CERVICAL, ANTERIOR APPROACH, WITH FUSION N/A 5/13/2013    Performed by Larry Martinez MD at [facility] OR 2ND FLR    ESOPHAGOGASTRODUODENOSCOPY (EGD) N/A 5/23/2018    Performed by Prince Vance MD at [facility] ENDO (4TH FLR)    ESOPHAGOGASTRODUODENOSCOPY (EGD) N/A 7/21/2017    Performed by Prince Vance MD at [facility] ENDO (4TH FLR)    ESOPHAGOGASTRODUODENOSCOPY (EGD) w/ dilation N/A 8/28/2017    Performed by Prince Vance MD at [facility] ENDO (4TH FLR)    FULGURATION-BLADDER N/A 1/26/2017    Performed by Shireen Mayo MD at [facility] OR 1ST FLR    HEART CATH-LEFT Left 1/7/2016    Performed by Alberto Gee MD at [facility] CATH LAB    HYSTERECTOMY  1975    endometriosis    INJECTION-BOTOX N/A 3/20/2018    Performed by [facility]en [facility] MD at [facility] OR 1ST FLR    INJECTION-BOTOX N/A 1/26/2017    Performed by Sh[facility]en Mayo MD at [facility] OR 1ST FLR    INJECTION-BULKING AGENT URETHRAL  OUTLET N/A 3/20/2018    Performed by Shireen PICKETT  MD [facility] at [facility] OR 1ST FLR    INSERTION-INTRAOCULAR LENS (IOL) Left 11/13/2014    Performed by Sanjana Coleman MD at [facility] OR 1ST FLR    INSERTION-SUPRAPUBIC TUBE-OPEN N/A 11/8/2016    Performed by Shireen Mayo MD at [facility] OR 2ND FLR    MANOMETRY-ESOPHAGEAL-WITH IMPEDANCE N/A 4/23/2018    Performed by Robert Menendez MD at [facility] ENDO (4TH FLR)    MYELOGRAM N/A 2/19/2013    Performed by [facility] Provider at [facility] OR 2ND FLR    pain pump placement      SQ Dilaudid Pump managed by Dr. Hillman, Pain Management    PHACOEMULSIFICATION-ASPIRATION-CATARACT Left 11/13/2014    Performed by Sanjana Coleman MD at [facility] OR 1ST FLR    MN UPPER GI ENDOSCOPY,DIAGNOSIS [68918 (CPT®)] N/A 7/16/2014    Performed by Prince Vance MD at [facility] ENDO (4TH FLR)    SPINAL CORD STIMULATOR REMOVAL      before Larissa    SPINE SURGERY  5-13-13    CERVICAL FUSION    TONSILLECTOMY         Review of patient's allergies indicates:   Allergen Reactions    (d)-limonene flavor      Other reaction(s): difficult intubation  Other reaction(s): Difficulty breathing    Bactrim [sulfamethoxazole-trimethoprim] Anaphylaxis    Benadryl [diphenhydramine hcl] Shortness Of Breath    Imitrex [sumatriptan succinate] Shortness Of Breath    Penicillins Shortness Of Breath     Other reaction(s): Jittery    Topamax [topiramate] Shortness Of Breath    Vancomycin Shortness Of Breath     Rash    Butorphanol tartrate     Darvocet a500 [propoxyphene n-acetaminophen]      Other reaction(s): Difficulty breathing    Fentanyl      Other reaction(s): Vomiting  Other reaction(s): Nausea  Other reaction(s): Itching  swelling    White petrolatum-zinc     Zinc oxide-white petrolatum      Other reaction(s): Difficulty breathing    Latex, natural rubber Itching and Rash    Phenytoin Rash and Other (See Comments)     Trouble breathing       Family History     Problem Relation (Age of Onset)    Cancer Mother (55), Father    Esophageal cancer  Father    Heart disease Maternal Uncle    No Known Problems Brother, Brother, Sister, Maternal Aunt, Paternal Aunt, Paternal Uncle, Maternal Grandfather, Paternal Grandmother, Paternal Grandfather    Parkinsonism Maternal Grandmother    Tremor Maternal Grandmother          Tobacco Use    Smoking status: Never Smoker    Smokeless tobacco: Never Used   Substance and Sexual Activity    Alcohol use: Yes    Drug use: No    Sexual activity: No       Review of Systems   Constitutional: Negative for activity change, chills, fatigue and fever.   HENT: Negative for facial swelling.    Eyes: Negative for discharge.   Respiratory: Negative for apnea and shortness of breath.    Gastrointestinal: Positive for abdominal pain. Negative for abdominal distention, constipation and nausea.   Genitourinary: Negative for difficulty urinating, frequency and urgency.   Musculoskeletal: Positive for back pain. Negative for arthralgias.   Skin: Negative for color change.   Neurological: Negative for dizziness.   Psychiatric/Behavioral: Negative for agitation.       Objective:           There is no height or weight on file to calculate BMI.            Drains     Drain                 Suprapubic Catheter 06/09/18 202 days                Physical Exam   Constitutional: She appears well-developed. No distress.   HENT:   Head: Normocephalic and atraumatic.   Eyes: Pupils are equal, round, and reactive to light. Right eye exhibits no discharge. Left eye exhibits no discharge.   Neck: Normal range of motion.   Abdominal: Soft. There is tenderness.   Spt draining clear yellow urine with some sediment     Musculoskeletal: Normal range of motion. She exhibits no edema.   Chronic back pain present on exam   Neurological: She is alert. No cranial nerve deficit.   Skin: Skin is warm and dry. She is not diaphoretic. No erythema.     Psychiatric: She has a normal mood and affect. Her behavior is normal.       Significant Labs:    BMP:  No results for  input(s): NA, K, CL, CO2, BUN, CREATININE, LABGLOM, GLUCOSE, CALCIUM in the last 168 hours.    CBC:  No results for input(s): WBC, HGB, HCT, PLT in the last 168 hours.    All pertinent labs results from the past 24 hours have been reviewed.    Significant Imaging:  All pertinent imaging results/findings from the past 24 hours have been reviewed.

## 2018-12-29 NOTE — SUBJECTIVE & OBJECTIVE
Interval History:     No acute events overnight  Hypotensive but this is her baseline at home  Catheter exchanged and new culture drawn    Review of Systems  Objective:     Temp:  [96.5 °F (35.8 °C)-97.8 °F (36.6 °C)] 97.8 °F (36.6 °C)  Pulse:  [52-66] 52  Resp:  [14-18] 14  SpO2:  [94 %-95 %] 95 %  BP: ()/(42-58) 94/56     Body mass index is 28.38 kg/m².            Drains     Drain                 Suprapubic Catheter 06/09/18 203 days                Physical Exam   Constitutional: She appears well-developed. No distress.   HENT:   Head: Normocephalic and atraumatic.   Eyes: Pupils are equal, round, and reactive to light. Right eye exhibits no discharge. Left eye exhibits no discharge.   Neck: Normal range of motion.   Abdominal: Soft. There is tenderness.   Spt draining clear yellow urine with some sediment     Musculoskeletal: Normal range of motion. She exhibits no edema.   Chronic back pain present on exam   Neurological: She is alert. No cranial nerve deficit.   Skin: Skin is warm and dry. She is not diaphoretic. No erythema.     Psychiatric: She has a normal mood and affect. Her behavior is normal.       Significant Labs:    BMP:  Recent Labs   Lab 12/28/18 2013      K 3.5      CO2 33*   BUN 12   CREATININE 1.0   CALCIUM 9.9       CBC:   Recent Labs   Lab 12/28/18 2013   WBC 6.21   HGB 12.3   HCT 38.1          Urine Culture: No results for input(s): LABURIN in the last 168 hours.  Urine Studies: No results for input(s): COLORU, APPEARANCEUA, PHUR, SPECGRAV, PROTEINUA, GLUCUA, KETONESU, BILIRUBINUA, OCCULTUA, NITRITE, UROBILINOGEN, LEUKOCYTESUR, RBCUA, WBCUA, BACTERIA, SQUAMEPITHEL, HYALINECASTS in the last 168 hours.    Invalid input(s): WRIGHTSUR  All pertinent labs results from the past 24 hours have been reviewed.    Significant Imaging:  All pertinent imaging results/findings from the past 24 hours have been reviewed.

## 2018-12-29 NOTE — H&P
[facility]  Urology  History & Physical    Patient Name: Tasha Hawley  MRN: [id number]  Admission Date: 12/28/2018  Code Status: Prior   Attending Provider: Shireen Mayo MD   Primary Care Physician: Mesfin Hodges Ii, MD  Principal Problem:<principal problem not specified>    Subjective:     HPI:  Tasha Hawley is a 63 y.o. female  with pmh of chronic pain, on chronic opiods, arthritis, anxiety, and a suprapubic catheter that was placed at the patients request for mixed incontinence and incomplete emptying     She had SUDS in 2016 which showed decreased sensation, urodynamic stress urinary incontinence, large bladder capacity, incomplete emptying. PVR was 650. She was managed with an indwelling catheter and then requested an SPT which was ultimately placed in 11/2016. She has been managing her bladder with the SPT since that time and has been seen multiple times for issues with drainage, sediment in the urine, and recurrent UTIs.      She has had multiple MDR UTIs in the past and has multiple severe allergies to antibiotics. She was recently set up for sensitivity testing but did not follow up. She has most recently been having gentamicin bladder washes with home health but is complaining of suprapubic pain and discomfort consistent with symptoms when she is having a UTI. Her last gentamicin bladder wash was on 12/24/18. No recent fevers or chills.    She was admitted for PICC line placement and initiation of IV antibiotics due to her resistance patterns and multiple severe antibiotic allergies as well as allergy sensitivity testing.       Past Medical History:   Diagnosis Date    Anticoagulant long-term use     Anxiety     Arthritis     Bilateral lower extremity edema     severe chronic    Carotid artery occlusion     Cataract     Depression     Fever blister     Hypothyroid     Iron deficiency anemia     Lumbar radiculopathy     with chronic pain    Ocular migraine      Sleep apnea     cpap       Past Surgical History:   Procedure Laterality Date    ADENOIDECTOMY      BACK SURGERY      x 12    BIOPSY-BLADDER N/A 3/20/2018    Performed by [facility] at [facility] OR 1ST FLR    BIOPSY-BLADDER N/A 1/26/2017    Performed by Shireen Mayo MD at [facility] OR 1ST FLR    CATARACT EXTRACTION W/  INTRAOCULAR LENS IMPLANT Left     Dr Coleman     CYSTOSCOPY N/A 3/20/2018    Performed by Shireen Mayo MD at [facility] OR 1ST FLR    CYSTOSCOPY N/A 1/26/2017    Performed by Shireen Mayo MD at [facility] OR 1ST FLR    CYSTOSCOPY N/A 11/8/2016    Performed by Shireen Mayo MD at [facility] OR 2ND FLR    DISCECTOMY, SPINE, CERVICAL, ANTERIOR APPROACH, WITH FUSION N/A 5/13/2013    Performed by Larry Martinez MD at [facility] OR 2ND FLR    ESOPHAGOGASTRODUODENOSCOPY (EGD) N/A 5/23/2018    Performed by Prince Vance MD at [facility] ENDO (4TH FLR)    ESOPHAGOGASTRODUODENOSCOPY (EGD) N/A 7/21/2017    Performed by Prince Vance MD at [facility] ENDO (4TH FLR)    ESOPHAGOGASTRODUODENOSCOPY (EGD) w/ dilation N/A 8/28/2017    Performed by Prince Vance MD at [facility] ENDO (4TH FLR)    FULGURATION-BLADDER N/A 1/26/2017    Performed by Shireen Mayo MD at [facility] OR 1ST FLR    HEART CATH-LEFT Left 1/7/2016    Performed by Alberto Gee MD at [facility] CATH LAB    HYSTERECTOMY  1975    endometriosis    INJECTION-BOTOX N/A 3/20/2018    Performed by Shireen Mayo MD at [facility] OR 1ST FLR    INJECTION-BOTOX N/A 1/26/2017    Performed by Shireen Mayo MD at [facility] OR 1ST FLR    INJECTION-BULKING AGENT URETHRAL  OUTLET N/A 3/20/2018    Performed by Shireen Mayo MD at [facility] OR 1ST FLR    INSERTION-INTRAOCULAR LENS (IOL) Left 11/13/2014    Performed by Sanjana Coleman MD at [facility] OR 1ST FLR    INSERTION-SUPRAPUBIC TUBE-OPEN N/A 11/8/2016    Performed by Shireen Mayo MD at [facility] OR 2ND FLR    MANOMETRY-ESOPHAGEAL-WITH IMPEDANCE N/A 4/23/2018    Performed by Robert Menendez MD at [facility] ENDO (4TH  FLR)    MYELOGRAM N/A 2/19/2013    Performed by [facility] Provider at [facility] OR 2ND FLR    pain pump placement      SQ Dilaudid Pump managed by Dr. Hillman, Pain Management    PHACOEMULSIFICATION-ASPIRATION-CATARACT Left 11/13/2014    Performed by Sanjana Coleman MD at [facility] OR 1ST FLR    ID UPPER GI ENDOSCOPY,DIAGNOSIS [78189 (CPT®)] N/A 7/16/2014    Performed by Prince Vance MD at [facility] ENDO (4TH FLR)    SPINAL CORD STIMULATOR REMOVAL      before Larissa    SPINE SURGERY  5-13-13    CERVICAL FUSION    TONSILLECTOMY         Review of patient's allergies indicates:   Allergen Reactions    (d)-limonene flavor      Other reaction(s): difficult intubation  Other reaction(s): Difficulty breathing    Bactrim [sulfamethoxazole-trimethoprim] Anaphylaxis    Benadryl [diphenhydramine hcl] Shortness Of Breath    Imitrex [sumatriptan succinate] Shortness Of Breath    Penicillins Shortness Of Breath     Other reaction(s): Jittery    Topamax [topiramate] Shortness Of Breath    Vancomycin Shortness Of Breath     Rash    Butorphanol tartrate     Darvocet a500 [propoxyphene n-acetaminophen]      Other reaction(s): Difficulty breathing    Fentanyl      Other reaction(s): Vomiting  Other reaction(s): Nausea  Other reaction(s): Itching  swelling    White petrolatum-zinc     Zinc oxide-white petrolatum      Other reaction(s): Difficulty breathing    Latex, natural rubber Itching and Rash    Phenytoin Rash and Other (See Comments)     Trouble breathing       Family History     Problem Relation (Age of Onset)    Cancer Mother (55), Father    Esophageal cancer Father    Heart disease Maternal Uncle    No Known Problems Brother, Brother, Sister, Maternal Aunt, Paternal Aunt, Paternal Uncle, Maternal Grandfather, Paternal Grandmother, Paternal Grandfather    Parkinsonism Maternal Grandmother    Tremor Maternal Grandmother          Tobacco Use    Smoking status: Never Smoker    Smokeless tobacco: Never Used    Substance and Sexual Activity    Alcohol use: Yes    Drug use: No    Sexual activity: No       Review of Systems   Constitutional: Negative for activity change, chills, fatigue and fever.   HENT: Negative for facial swelling.    Eyes: Negative for discharge.   Respiratory: Negative for apnea and shortness of breath.    Gastrointestinal: Positive for abdominal pain. Negative for abdominal distention, constipation and nausea.   Genitourinary: Negative for difficulty urinating, frequency and urgency.   Musculoskeletal: Positive for back pain. Negative for arthralgias.   Skin: Negative for color change.   Neurological: Negative for dizziness.   Psychiatric/Behavioral: Negative for agitation.       Objective:           There is no height or weight on file to calculate BMI.            Drains     Drain                 Suprapubic Catheter 06/09/18 202 days                Physical Exam   Constitutional: She appears well-developed. No distress.   HENT:   Head: Normocephalic and atraumatic.   Eyes: Pupils are equal, round, and reactive to light. Right eye exhibits no discharge. Left eye exhibits no discharge.   Neck: Normal range of motion.   Abdominal: Soft. There is tenderness.   Spt draining clear yellow urine with some sediment     Musculoskeletal: Normal range of motion. She exhibits no edema.   Chronic back pain present on exam   Neurological: She is alert. No cranial nerve deficit.   Skin: Skin is warm and dry. She is not diaphoretic. No erythema.     Psychiatric: She has a normal mood and affect. Her behavior is normal.       Significant Labs:    BMP:  No results for input(s): NA, K, CL, CO2, BUN, CREATININE, LABGLOM, GLUCOSE, CALCIUM in the last 168 hours.    CBC:  No results for input(s): WBC, HGB, HCT, PLT in the last 168 hours.    All pertinent labs results from the past 24 hours have been reviewed.    Significant Imaging:  All pertinent imaging results/findings from the past 24 hours have been  reviewed.    Assessment and Plan:     Urinary tract infection    64 yo F with spt admitted for IV antibiotics and PICC placement.    -Admit to urology  -PICC line with PICC team tomorrow  -Social work consult for IV antibiotic infusions  -SPT exchange and urine for culture  -Allergy consult for sensitivity testing  -Home meds           VTE Risk Mitigation (From admission, onward)    None          Raciel Sebastian MD  Urology  [facility]    As above.

## 2018-12-29 NOTE — CONSULTS
Double lumen PICC placed in right brachial vein of DAVID, 38cm in length with 0cm exposed and 29cm arm circumference. Lot#[id number].

## 2018-12-29 NOTE — NURSING
Pt BP low, asymptomatic, states her BP is sometimes low. MD on call with urology notified, states ok. Will continue to monitor.

## 2018-12-29 NOTE — CONSULTS
ALLERGY & IMMUNOLOGY SERVICE - INITIAL CONSULT      HISTORY OF PRESENT ILLNESS     Patient ID: Tasha Hawley is a 63 y.o. female    Consulted for: multiple drug allergies    Consulted by: Raciel Sebastian MD    HPI:     Tasha Hawley is a 63 y.o. female with PMH of recurrent UTIs due to indwelling catheter who is admitted to hospital with UTI.  She is growing GNR, currently awaiting final ID and susceptibilities.  Urology consulted allergy due to history of multiple drug allergies.  They are specifically interested in PCN skin testing to hopefully start her on a beta lactam.    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  History is supplemented by 12/4/18 allergy outpatient visit.    PCN allergy dates to childhood - vomiting, rash, SOB with PCNS.  She had PCN several years ago and had a rash.      Per 12/4 note and confirmed with patient, she has tolerated keflex before with no concerning symptoms.    She is currently on seroquel nightly and by report takes it for sleep.  She last took it last night.     REVIEW OF SYSTEMS     Review of Systems - General ROS: negative for - fever or weight loss  Psychological ROS: positive for - sleep disturbances  Ophthalmic ROS: negative for - itchy eyes  ENT ROS: negative for - nasal congestion or sore throat  Allergy and Immunology ROS: negative for - hives  Respiratory ROS: no cough, shortness of breath, or wheezing  Cardiovascular ROS: no chest pain or dyspnea on exertion  Gastrointestinal ROS: no abdominal pain, change in bowel habits, or black or bloody stools  Genito-Urinary ROS: positive for - dysuria  Musculoskeletal ROS: negative for - joint pain or joint swelling  Neurological ROS: negative for - headaches  Dermatological ROS: negative for rash       MEDICAL HISTORY     Past Medical History:   Diagnosis Date    Anticoagulant long-term use     Anxiety     Arthritis      Bilateral lower extremity edema     severe chronic    Carotid artery occlusion     Cataract     Depression     Fever blister     Hypothyroid     Iron deficiency anemia     Lumbar radiculopathy     with chronic pain    Ocular migraine     Sleep apnea     cpap     Past Surgical History:   Procedure Laterality Date    ADENOIDECTOMY      BACK SURGERY      x 12    BIOPSY-BLADDER N/A 3/20/2018    Performed by [facility]ireen Mayo MD at [facility] OR 1ST FLR    BIOPSY-BLADDER N/A 1/26/2017    Performed by [facility]en [facility] at [facility] OR [facility]ST FLR    CATARACT EXTRACTION W/  INTRAOCULAR LENS IMPLANT Left     Dr Coleman     CYSTOSCOPY N/A 3/20/2018    Performed by [facility]en [facility] at [facility] OR [facility]ST FLR    CYSTOSCOPY N/A 1/26/2017    Performed by [facility]en Mayo MD at [facility] OR 1ST FLR    CYSTOSCOPY N/A 11/8/2016    Performed by [facility]en Mayo MD at [facility] OR 2ND FLR    DISCECTOMY, SPINE, CERVICAL, ANTERIOR APPROACH, WITH FUSION N/A 5/13/2013    Performed by Larry Martinez MD at [facility] OR 2ND FLR    ESOPHAGOGASTRODUODENOSCOPY (EGD) N/A 5/23/2018    Performed by Prince Vance MD at [facility] ENDO (4TH FLR)    ESOPHAGOGASTRODUODENOSCOPY (EGD) N/A 7/21/2017    Performed by Prince Vance MD at [facility] ENDO (4TH FLR)    ESOPHAGOGASTRODUODENOSCOPY (EGD) w/ dilation N/A 8/28/2017    Performed by Prince Vance MD at [facility] ENDO (4TH FLR)    FULGURATION-BLADDER N/A 1/26/2017    Performed by Shireen Mayo MD at [facility] OR 1ST FLR    HEART CATH-LEFT Left 1/7/2016    Performed by Alberto Gee MD at [facility] CATH LAB    HYSTERECTOMY  1975    endometriosis    INJECTION-BOTOX N/A 3/20/2018    Performed by Shireen [facility] MD at [facility] OR 1ST FLR    INJECTION-BOTOX N/A 1/26/2017    Performed by [facility]en [facility] MD at [facility] OR 1ST FLR    INJECTION-BULKING AGENT URETHRAL  OUTLET N/A 3/20/2018    Performed by Shireen Mayo MD at [facility] OR 1ST FLR    INSERTION-INTRAOCULAR LENS (IOL) Left 11/13/2014    Performed by Sanjana SANTOS  MD [place] [facility] OR 1ST FLR    INSERTION-SUPRAPUBIC TUBE-OPEN N/A 11/8/2016    Performed by Sh[facility]en Mayo MD at [facility] OR 2ND FLR    MANOMETRY-ESOPHAGEAL-WITH IMPEDANCE N/A 4/23/2018    Performed by Robert Menendez MD at [facility] ENDO (4TH FLR)    MYELOGRAM N/A 2/19/2013    Performed by [facility] at [facility] OR 2ND FLR    pain pump placement      SQ Dilaudid Pump managed by Dr. Hillman, Pain Management    PHACOEMULSIFICATION-ASPIRATION-CATARACT Left 11/13/2014    Performed by Sanjana Coleman MD at [facility] OR 1ST FLR    OK UPPER GI ENDOSCOPY,DIAGNOSIS [68323 (CPT®)] N/A 7/16/2014    Performed by Prince Vance MD at [facility] ENDO (4TH FLR)    SPINAL CORD STIMULATOR REMOVAL      before Larissa    SPINE SURGERY  5-13-13    CERVICAL FUSION    TONSILLECTOMY         Current Facility-Administered Medications:     aspirin EC tablet 81 mg, 81 mg, Oral, Daily, Raciel Sebastian MD, 81 mg at 12/30/18 0849    atorvastatin tablet 80 mg, 80 mg, Oral, Daily, Raciel Sebastian MD, 80 mg at 12/30/18 0847    buPROPion TB24 tablet 300 mg, 300 mg, Oral, Daily, Raciel Sebastian MD, 300 mg at 12/30/18 0847    butalbital-aspirin-caffeine -40 mg 1 capsule, 1 capsule, Oral, TID PRN, Raceil Sebastian MD    docusate sodium capsule 100 mg, 100 mg, Oral, Daily, Raciel Sebastian MD, 100 mg at 12/30/18 0849    FLUoxetine capsule 60 mg, 60 mg, Oral, Daily, Raciel Sebastian MD, 60 mg at 12/30/18 0848    furosemide tablet 40 mg, 40 mg, Oral, BID, Raciel Sebastian MD, 40 mg at 12/30/18 2059    gentamicin (GARAMYCIN) 375.2 mg in sodium chloride 0.9% 100 mL IVPB, 5 mg/kg, Intravenous, Q24H, Raciel Sebastian MD, Stopped at 12/30/18 2300    heparin (porcine) injection 5,000 Units, 5,000 Units, Subcutaneous, Q8H, Raciel Sebastian MD, 5,000 Units at 12/31/18 0625    HYDROcodone-acetaminophen  mg per tablet 1 tablet, 1 tablet, Oral, Q4H PRN, Raciel Sebastian MD, 1 tablet at 12/30/18 1802    Lactobacillus rhamnosus GG capsule 1 capsule, 1 capsule, Oral,  Daily, Raciel Sebastian MD, 1 capsule at 12/30/18 0937    levothyroxine tablet 125 mcg, 125 mcg, Oral, Before breakfast, Raciel Sebastian MD, 125 mcg at 12/31/18 0625    lidocaine 5 % patch 1 patch, 1 patch, Transdermal, Daily PRN, Raciel Sebastian MD    ondansetron injection 4 mg, 4 mg, Intravenous, Q6H PRN, Raciel Sebastian MD    oxybutynin 24 hr tablet 15 mg, 15 mg, Oral, Daily, Raciel Sebastian MD, 15 mg at 12/30/18 0849    pantoprazole EC tablet 40 mg, 40 mg, Oral, Daily, Raciel Sebastian MD, 40 mg at 12/30/18 0850    potassium chloride SA CR tablet 20 mEq, 20 mEq, Oral, BID, Raciel Sebastian MD, 20 mEq at 12/30/18 2059    QUEtiapine tablet 200 mg, 200 mg, Oral, QHS, Raciel Sebastian MD, 200 mg at 12/30/18 2059    senna tablet 2 tablet, 2 tablet, Oral, QHS, Raciel Sebastian MD, 2 tablet at 12/30/18 2059    Flushing [facility] Protocol, , , Until Discontinued **AND** sodium chloride 0.9% flush 10 mL, 10 mL, Intravenous, Q6H, 10 mL at 12/31/18 0625 **AND** sodium chloride 0.9% flush 10 mL, 10 mL, Intravenous, PRN, Shireen Mayo MD    sodium chloride 0.9% flush 3 mL, 3 mL, Intravenous, PRN, Raciel Sebastian MD    tiZANidine tablet 4 mg, 4 mg, Oral, Q8H PRN, Raciel Sebastian MD, 4 mg at 12/29/18 1323    traZODone tablet 200 mg, 200 mg, Oral, QHS, Raciel Sebastian MD, 200 mg at 12/30/18 2059\  Family History   Problem Relation Age of Onset    Cancer Mother 55        breast    Cancer Father         esophagus,had laryngectomy    Esophageal cancer Father     Parkinsonism Maternal Grandmother     Tremor Maternal Grandmother     No Known Problems Brother     No Known Problems Brother     Heart disease Maternal Uncle     No Known Problems Sister     No Known Problems Maternal Aunt     No Known Problems Paternal Aunt     No Known Problems Paternal Uncle     No Known Problems Maternal Grandfather     No Known Problems Paternal Grandmother     No Known Problems Paternal Grandfather     Melanoma Neg Hx     Amblyopia Neg Hx     Blindness Neg Hx   "   Cataracts Neg Hx     Diabetes Neg Hx     Glaucoma Neg Hx     Hypertension Neg Hx     Macular degeneration Neg Hx     Retinal detachment Neg Hx     Strabismus Neg Hx     Stroke Neg Hx     Thyroid disease Neg Hx     Colon cancer Neg Hx      Social History     Tobacco Use    Smoking status: Never Smoker    Smokeless tobacco: Never Used   Substance Use Topics    Alcohol use: Yes    Drug use: No     Review of patient's allergies indicates:   Allergen Reactions    (d)-limonene flavor      Other reaction(s): difficult intubation  Other reaction(s): Difficulty breathing    Bactrim [sulfamethoxazole-trimethoprim] Anaphylaxis    Benadryl [diphenhydramine hcl] Shortness Of Breath    Imitrex [sumatriptan succinate] Shortness Of Breath    Penicillins Shortness Of Breath     Other reaction(s): Jittery    Topamax [topiramate] Shortness Of Breath    Vancomycin Shortness Of Breath     Rash    Butorphanol tartrate     Darvocet a500 [propoxyphene n-acetaminophen]      Other reaction(s): Difficulty breathing    Fentanyl      Other reaction(s): Vomiting  Other reaction(s): Nausea  Other reaction(s): Itching  swelling    White petrolatum-zinc     Zinc oxide-white petrolatum      Other reaction(s): Difficulty breathing    Latex, natural rubber Itching and Rash    Phenytoin Rash and Other (See Comments)     Trouble breathing          PHYSICAL EXAM     BP (!) 94/56 (BP Location: Left arm, Patient Position: Lying)   Pulse (!) 52   Temp 97.8 °F (36.6 °C) (Oral)   Resp 14   Ht 5' 4" (1.626 m)   Wt 75 kg (165 lb 5.5 oz)   LMP  (LMP Unknown)   SpO2 95%   Breastfeeding? No   BMI 28.38 kg/m²     Physical Exam   Constitutional: She appears well-developed. No distress.   HENT: MMM  Head: Normocephalic and atraumatic.   Eyes:  Right eye exhibits no discharge. Left eye exhibits no discharge.   Neck: Normal range of motion.   Abdominal: Soft. There is tenderness in lower right and lower left quantrants   "   Musculoskeletal: Normal range of motion.   Neurological: She is alert and awake.   Skin: Skin is warm and dry. No rash  Psychiatric: She has a normal mood and affect.        LABORATORY STUDIES     12/28 urine culture growing GNR     ALLERGEN TESTING     Skin Prick:unable to be done today as patient had seroquel last night which has antihistaminic properties     CHART REVIEW     H&P, progress notes, previous allergy notes, pertinent labs     ASSESSMENT & PLAN     Tasha Hawley is a 63 y.o. female with     1.  UTI: 12/28 urine culture growing GNR.  Final ID and susceptibilities are still pending.  Currently on IV gentamicin.    2.  Multiple drug allergies: Previously established with Dr. Sanders and has follow up on 1/9/19 for PCN skin testing.  Patient has been on nightly seroquel (last administered 12/18 pm) which has antihistaminic properties.  These properties interfere with skin testing and unfortunately cannot be done today.  Cephalosporins have <2% cross reactivity with penicillin. The cross reactivity is even less the higher the generation.  She has tolerated keflex in the past which is 1st generation.  Would recommend a cephalosporin (3rd generation is fine too) to treat her acutely if a beta lactam is desired.  This can be given through a graded challenge which consists of giving 1/100 of the recommended dose and observing for about 30 minutes.  Then giving 1/10 of the recommended dose and observing for about 30 minutes.  Then giving the remainder of the dose and observing for about an hour.   If no issues, ok to continue with this antibiotic.  In the meantime, we will attempt to do a histamine control on Monday 12/31.  If she has an appropriate histamine/positive control, will move forward with PCN skin testing.  This will also give us an opportunity to wait for susceptibilities as another antibiotic for testing and challenge may be even more appropriate at that time.  Please dc seroquel ASAP.    Refrain from all other antihistamines until seen by allergy on Monday.     I have discussed the patient's case with my attending physician, Dr. Gavin Savage.  Thank you for allowing us to help care for your patient.  Please contact us with any concerns.    Follow up: 12/31      Susan Montenegro MD  Allergy Fellow PGY-5  Pager [phone]

## 2018-12-29 NOTE — NURSING
Pt resting comfortably w/o c/o pain. Suprapubic catheter in place. Output sufficient. VSS, will continue monitor.

## 2018-12-29 NOTE — PROGRESS NOTES
[facility]  Urology  Progress Note    Patient Name: Tasha Hawley  MRN: [id number]  Admission Date: 12/28/2018  Hospital Length of Stay: 0 days  Code Status: Full Code   Attending Provider: Shireen Mayo MD   Primary Care Physician: Mesfin Hodges Ii, MD    Subjective:     HPI:  Tasha Hawley is a 63 y.o. female  with pmh of chronic pain, on chronic opiods, arthritis, anxiety, and a suprapubic catheter that was placed at the patients request for mixed incontinence and incomplete emptying     She had SUDS in 2016 which showed decreased sensation, urodynamic stress urinary incontinence, large bladder capacity, incomplete emptying. PVR was 650. She was managed with an indwelling catheter and then requested an SPT which was ultimately placed in 11/2016. She has been managing her bladder with the SPT since that time and has been seen multiple times for issues with drainage, sediment in the urine, and recurrent UTIs.      She has had multiple MDR UTIs in the past and has multiple severe allergies to antibiotics. She was recently set up for sensitivity testing but did not follow up. She has most recently been having gentamicin bladder washes with home health but is complaining of suprapubic pain and discomfort consistent with symptoms when she is having a UTI. Her last gentamicin bladder wash was on 12/24/18. No recent fevers or chills.    She was admitted for PICC line placement and initiation of IV antibiotics due to her resistance patterns and multiple severe antibiotic allergies as well as allergy sensitivity testing.       Interval History:     No acute events overnight  Hypotensive but this is her baseline at home  Catheter exchanged and new culture drawn    Review of Systems  Objective:     Temp:  [96.5 °F (35.8 °C)-97.8 °F (36.6 °C)] 97.8 °F (36.6 °C)  Pulse:  [52-66] 52  Resp:  [14-18] 14  SpO2:  [94 %-95 %] 95 %  BP: ()/(42-58) 94/56     Body mass index is 28.38 kg/m².             Drains     Drain                 Suprapubic Catheter 06/09/18 203 days                Physical Exam   Constitutional: She appears well-developed. No distress.   HENT:   Head: Normocephalic and atraumatic.   Eyes: Pupils are equal, round, and reactive to light. Right eye exhibits no discharge. Left eye exhibits no discharge.   Neck: Normal range of motion.   Abdominal: Soft. There is tenderness.   Spt draining clear yellow urine with some sediment     Musculoskeletal: Normal range of motion. She exhibits no edema.   Chronic back pain present on exam   Neurological: She is alert. No cranial nerve deficit.   Skin: Skin is warm and dry. She is not diaphoretic. No erythema.     Psychiatric: She has a normal mood and affect. Her behavior is normal.       Significant Labs:    BMP:  Recent Labs   Lab 12/28/18 2013      K 3.5      CO2 33*   BUN 12   CREATININE 1.0   CALCIUM 9.9       CBC:   Recent Labs   Lab 12/28/18 2013   WBC 6.21   HGB 12.3   HCT 38.1          Urine Culture: No results for input(s): LABURIN in the last 168 hours.  Urine Studies: No results for input(s): COLORU, APPEARANCEUA, PHUR, SPECGRAV, PROTEINUA, GLUCUA, KETONESU, BILIRUBINUA, OCCULTUA, NITRITE, UROBILINOGEN, LEUKOCYTESUR, RBCUA, WBCUA, BACTERIA, SQUAMEPITHEL, HYALINECASTS in the last 168 hours.    Invalid input(s): WRIGHTSUR  All pertinent labs results from the past 24 hours have been reviewed.    Significant Imaging:  All pertinent imaging results/findings from the past 24 hours have been reviewed.                  Assessment/Plan:     Urinary tract infection    64 yo F with spt admitted for IV antibiotics and PICC placement.    -Admit to urology  -PICC line with PICC team today  -Social work consult for IV antibiotic infusions  -SPT exchanged, urine for culture pending  -Allergy consult for sensitivity testing  -Home meds           VTE Risk Mitigation (From admission, onward)        Ordered     Place sequential compression  device  Until discontinued      12/28/18 1940     Place MALLORIE hose  Until discontinued      12/28/18 1940     IP VTE LOW RISK PATIENT  Once      12/28/18 1940          Raciel Sebastian MD  Urology  [facility]

## 2018-12-30 LAB
ANION GAP SERPL CALC-SCNC: 8 MMOL/L
BUN SERPL-MCNC: 11 MG/DL
CALCIUM SERPL-MCNC: 9 MG/DL
CHLORIDE SERPL-SCNC: 100 MMOL/L
CO2 SERPL-SCNC: 32 MMOL/L
CREAT SERPL-MCNC: 0.9 MG/DL
EST. GFR  (AFRICAN AMERICAN): >60 ML/MIN/1.73 M^2
EST. GFR  (NON AFRICAN AMERICAN): >60 ML/MIN/1.73 M^2
GENTAMICIN SERPL-MCNC: 11.3 UG/ML
GLUCOSE SERPL-MCNC: 144 MG/DL
POTASSIUM SERPL-SCNC: 3.5 MMOL/L
SODIUM SERPL-SCNC: 140 MMOL/L

## 2018-12-30 PROCEDURE — 93005 ELECTROCARDIOGRAM TRACING: CPT | Mod: HCNC

## 2018-12-30 PROCEDURE — 93010 ELECTROCARDIOGRAM REPORT: CPT | Mod: HCNC,,, | Performed by: INTERNAL MEDICINE

## 2018-12-30 PROCEDURE — 80170 ASSAY OF GENTAMICIN: CPT | Mod: HCNC

## 2018-12-30 PROCEDURE — 25000003 PHARM REV CODE 250: Mod: HCNC | Performed by: STUDENT IN AN ORGANIZED HEALTH CARE EDUCATION/TRAINING PROGRAM

## 2018-12-30 PROCEDURE — 25000003 PHARM REV CODE 250: Mod: HCNC | Performed by: UROLOGY

## 2018-12-30 PROCEDURE — A4216 STERILE WATER/SALINE, 10 ML: HCPCS | Mod: HCNC | Performed by: UROLOGY

## 2018-12-30 PROCEDURE — 36415 COLL VENOUS BLD VENIPUNCTURE: CPT | Mod: HCNC

## 2018-12-30 PROCEDURE — 63600175 PHARM REV CODE 636 W HCPCS: Mod: HCNC | Performed by: STUDENT IN AN ORGANIZED HEALTH CARE EDUCATION/TRAINING PROGRAM

## 2018-12-30 PROCEDURE — G0378 HOSPITAL OBSERVATION PER HR: HCPCS | Mod: HCNC

## 2018-12-30 PROCEDURE — 80048 BASIC METABOLIC PNL TOTAL CA: CPT | Mod: HCNC

## 2018-12-30 PROCEDURE — 93010 EKG 12-LEAD: ICD-10-PCS | Mod: HCNC,,, | Performed by: INTERNAL MEDICINE

## 2018-12-30 RX ADMIN — Medication 10 ML: at 12:12

## 2018-12-30 RX ADMIN — Medication 1 CAPSULE: at 09:12

## 2018-12-30 RX ADMIN — LEVOTHYROXINE SODIUM 125 MCG: 125 TABLET ORAL at 05:12

## 2018-12-30 RX ADMIN — SENNOSIDES 2 TABLET: 8.6 TABLET, FILM COATED ORAL at 08:12

## 2018-12-30 RX ADMIN — ASPIRIN 81 MG: 81 TABLET, COATED ORAL at 08:12

## 2018-12-30 RX ADMIN — QUETIAPINE FUMARATE 200 MG: 100 TABLET ORAL at 08:12

## 2018-12-30 RX ADMIN — FUROSEMIDE 40 MG: 40 TABLET ORAL at 08:12

## 2018-12-30 RX ADMIN — HEPARIN SODIUM 5000 UNITS: 5000 INJECTION, SOLUTION INTRAVENOUS; SUBCUTANEOUS at 02:12

## 2018-12-30 RX ADMIN — HYDROCODONE BITARTRATE AND ACETAMINOPHEN 1 TABLET: 10; 325 TABLET ORAL at 06:12

## 2018-12-30 RX ADMIN — FLUOXETINE 60 MG: 20 CAPSULE ORAL at 08:12

## 2018-12-30 RX ADMIN — Medication 10 ML: at 05:12

## 2018-12-30 RX ADMIN — POTASSIUM CHLORIDE 20 MEQ: 1500 TABLET, EXTENDED RELEASE ORAL at 08:12

## 2018-12-30 RX ADMIN — TRAZODONE HYDROCHLORIDE 200 MG: 100 TABLET ORAL at 08:12

## 2018-12-30 RX ADMIN — HYDROCODONE BITARTRATE AND ACETAMINOPHEN 1 TABLET: 10; 325 TABLET ORAL at 02:12

## 2018-12-30 RX ADMIN — GENTAMICIN SULFATE 375.2 MG: 40 INJECTION, SOLUTION INTRAMUSCULAR; INTRAVENOUS at 10:12

## 2018-12-30 RX ADMIN — PANTOPRAZOLE SODIUM 40 MG: 40 TABLET, DELAYED RELEASE ORAL at 08:12

## 2018-12-30 RX ADMIN — OXYBUTYNIN CHLORIDE 15 MG: 10 TABLET, FILM COATED, EXTENDED RELEASE ORAL at 08:12

## 2018-12-30 RX ADMIN — BUPROPION HYDROCHLORIDE 300 MG: 150 TABLET, EXTENDED RELEASE ORAL at 08:12

## 2018-12-30 RX ADMIN — HEPARIN SODIUM 5000 UNITS: 5000 INJECTION, SOLUTION INTRAVENOUS; SUBCUTANEOUS at 05:12

## 2018-12-30 RX ADMIN — DOCUSATE SODIUM 100 MG: 100 CAPSULE, LIQUID FILLED ORAL at 08:12

## 2018-12-30 RX ADMIN — HEPARIN SODIUM 5000 UNITS: 5000 INJECTION, SOLUTION INTRAVENOUS; SUBCUTANEOUS at 10:12

## 2018-12-30 RX ADMIN — ATORVASTATIN CALCIUM 80 MG: 20 TABLET, FILM COATED ORAL at 08:12

## 2018-12-30 NOTE — SUBJECTIVE & OBJECTIVE
Interval History:     No acute events overnight, some chest discomfort since admission, ECG normal overnight  Hypotensive but this is her baseline at home  Catheter exchanged and new culture drawn  GNR in culture  Allergy unable to do skin testing due to some of her home medications  PICC in place    Review of Systems    Objective:     Temp:  [96.3 °F (35.7 °C)-98.4 °F (36.9 °C)] 97.9 °F (36.6 °C)  Pulse:  [50-58] 58  Resp:  [16-19] 18  SpO2:  [92 %-98 %] 95 %  BP: ()/(38-59) 106/52     Body mass index is 28.38 kg/m².            Drains     Drain                 Suprapubic Catheter 06/09/18 203 days                Physical Exam   Constitutional: She appears well-developed. No distress.   HENT:   Head: Normocephalic and atraumatic.   Eyes: Pupils are equal, round, and reactive to light. Right eye exhibits no discharge. Left eye exhibits no discharge.   Neck: Normal range of motion.   Abdominal: Soft. There is tenderness.   Spt draining clear yellow urine with some sediment     Musculoskeletal: Normal range of motion. She exhibits no edema.   Chronic back pain present on exam   Neurological: She is alert. No cranial nerve deficit.   Skin: Skin is warm and dry. She is not diaphoretic. No erythema.     Psychiatric: She has a normal mood and affect. Her behavior is normal.       Significant Labs:    BMP:  Recent Labs   Lab 12/28/18 2013 12/30/18  0824    140   K 3.5 3.5    100   CO2 33* 32*   BUN 12 11   CREATININE 1.0 0.9   CALCIUM 9.9 9.0       CBC:   Recent Labs   Lab 12/28/18 2013   WBC 6.21   HGB 12.3   HCT 38.1          Urine Culture:   Recent Labs   Lab 12/29/18  0042   LABURIN GRAM NEGATIVE PER  >100,000 cfu/ml  Identification and susceptibility pending       Urine Studies: No results for input(s): COLORU, APPEARANCEUA, PHUR, SPECGRAV, PROTEINUA, GLUCUA, KETONESU, BILIRUBINUA, OCCULTUA, NITRITE, UROBILINOGEN, LEUKOCYTESUR, RBCUA, WBCUA, BACTERIA, SQUAMEPITHEL, HYALINECASTS in the last  168 hours.    Invalid input(s): KRISTIN  All pertinent labs results from the past 24 hours have been reviewed.    Significant Imaging:  All pertinent imaging results/findings from the past 24 hours have been reviewed.

## 2018-12-30 NOTE — PROGRESS NOTES
[facility]  Urology  Progress Note    Patient Name: Tasha Hawley  MRN: [id number]  Admission Date: 12/28/2018  Hospital Length of Stay: 0 days  Code Status: Full Code   Attending Provider: Shireen Mayo MD   Primary Care Physician: Mesfin Hodges Ii, MD    Subjective:     HPI:  Tasha Hawley is a 63 y.o. female  with pmh of chronic pain, on chronic opiods, arthritis, anxiety, and a suprapubic catheter that was placed at the patients request for mixed incontinence and incomplete emptying     She had SUDS in 2016 which showed decreased sensation, urodynamic stress urinary incontinence, large bladder capacity, incomplete emptying. PVR was 650. She was managed with an indwelling catheter and then requested an SPT which was ultimately placed in 11/2016. She has been managing her bladder with the SPT since that time and has been seen multiple times for issues with drainage, sediment in the urine, and recurrent UTIs.      She has had multiple MDR UTIs in the past and has multiple severe allergies to antibiotics. She was recently set up for sensitivity testing but did not follow up. She has most recently been having gentamicin bladder washes with home health but is complaining of suprapubic pain and discomfort consistent with symptoms when she is having a UTI. Her last gentamicin bladder wash was on 12/24/18. No recent fevers or chills.    She was admitted for PICC line placement and initiation of IV antibiotics due to her resistance patterns and multiple severe antibiotic allergies as well as allergy sensitivity testing.       Interval History:     No acute events overnight, some chest discomfort since admission, ECG normal overnight  Hypotensive but this is her baseline at home  Catheter exchanged and new culture drawn  GNR in culture  Allergy unable to do skin testing due to some of her home medications  PICC in place    Review of Systems    Objective:     Temp:  [96.3 °F (35.7 °C)-98.4 °F  (36.9 °C)] 97.9 °F (36.6 °C)  Pulse:  [50-58] 58  Resp:  [16-19] 18  SpO2:  [92 %-98 %] 95 %  BP: ()/(38-59) 106/52     Body mass index is 28.38 kg/m².            Drains     Drain                 Suprapubic Catheter 06/09/18 203 days                Physical Exam   Constitutional: She appears well-developed. No distress.   HENT:   Head: Normocephalic and atraumatic.   Eyes: Pupils are equal, round, and reactive to light. Right eye exhibits no discharge. Left eye exhibits no discharge.   Neck: Normal range of motion.   Abdominal: Soft. There is tenderness.   Spt draining clear yellow urine with some sediment     Musculoskeletal: Normal range of motion. She exhibits no edema.   Chronic back pain present on exam   Neurological: She is alert. No cranial nerve deficit.   Skin: Skin is warm and dry. She is not diaphoretic. No erythema.     Psychiatric: She has a normal mood and affect. Her behavior is normal.       Significant Labs:    BMP:  Recent Labs   Lab 12/28/18 2013 12/30/18  0824    140   K 3.5 3.5    100   CO2 33* 32*   BUN 12 11   CREATININE 1.0 0.9   CALCIUM 9.9 9.0       CBC:   Recent Labs   Lab 12/28/18 2013   WBC 6.21   HGB 12.3   HCT 38.1          Urine Culture:   Recent Labs   Lab 12/29/18  0042   LABURIN GRAM NEGATIVE PER  >100,000 cfu/ml  Identification and susceptibility pending       Urine Studies: No results for input(s): COLORU, APPEARANCEUA, PHUR, SPECGRAV, PROTEINUA, GLUCUA, KETONESU, BILIRUBINUA, OCCULTUA, NITRITE, UROBILINOGEN, LEUKOCYTESUR, RBCUA, WBCUA, BACTERIA, SQUAMEPITHEL, HYALINECASTS in the last 168 hours.    Invalid input(s): WRIGHTSUR  All pertinent labs results from the past 24 hours have been reviewed.    Significant Imaging:  All pertinent imaging results/findings from the past 24 hours have been reviewed.                  Assessment/Plan:     Urinary tract infection    62 yo F with spt admitted for IV antibiotics and PICC placement.    -Admit to  urology  -PICC line in place  -Social work consult for IV antibiotic infusions once we know sensitivities  -SPT exchanged, urine for culture with GNR  -Allergy consult for sensitivity testing but unable to perform  -Home meds  -continue gentamicin  -daily BMP, Cr WNL            VTE Risk Mitigation (From admission, onward)        Ordered     heparin (porcine) injection 5,000 Units  Every 8 hours      12/29/18 0925     Place sequential compression device  Until discontinued      12/28/18 1940     Place MALLORIE hose  Until discontinued      12/28/18 1940     IP VTE LOW RISK PATIENT  Once      12/28/18 1940          Alayna Hernandez MD  Urology  [facility]

## 2018-12-30 NOTE — NURSING
"Pt BP 90/48 manually, pt. Reports that she has been having intermittent "fluttering like chest pain for two days"  Pt. Was questioned if she had mentioned this to medical team and stated no.  Urology on call paged, will continue to monitor.   "

## 2018-12-30 NOTE — PLAN OF CARE
Problem: Adult Inpatient Plan of Care  Goal: Plan of Care Review  Outcome: Ongoing (interventions implemented as appropriate)  Pt verbalized understanding plan of care. Frequent assessments done and fall precautions maintained. Pain and discomfort controlled. Will continue to monitor.

## 2018-12-30 NOTE — ASSESSMENT & PLAN NOTE
64 yo F with spt admitted for IV antibiotics and PICC placement.    -Admit to urology  -PICC line in place  -Social work consult for IV antibiotic infusions once we know sensitivities  -SPT exchanged, urine for culture with GNR  -Allergy consult for sensitivity testing but unable to perform  -Home meds

## 2018-12-30 NOTE — PLAN OF CARE
Problem: Adult Inpatient Plan of Care  Goal: Plan of Care Review  Outcome: Ongoing (interventions implemented as appropriate)  Pt continues to have non symptomatic bradycardia, pt remains with low blood pressure.  Pt. Reported intermittent chest pain and 12 lead EKG performed, Pt. Pain is moderately controlled with current pain regimen.  Will continue to monitor.

## 2018-12-31 VITALS
HEIGHT: 64 IN | DIASTOLIC BLOOD PRESSURE: 50 MMHG | TEMPERATURE: 96 F | WEIGHT: 165.38 LBS | OXYGEN SATURATION: 94 % | HEART RATE: 60 BPM | SYSTOLIC BLOOD PRESSURE: 96 MMHG | RESPIRATION RATE: 18 BRPM | BODY MASS INDEX: 28.24 KG/M2

## 2018-12-31 LAB
ANION GAP SERPL CALC-SCNC: 11 MMOL/L
BUN SERPL-MCNC: 12 MG/DL
CALCIUM SERPL-MCNC: 9.1 MG/DL
CHLORIDE SERPL-SCNC: 101 MMOL/L
CO2 SERPL-SCNC: 31 MMOL/L
CREAT SERPL-MCNC: 0.8 MG/DL
EST. GFR  (AFRICAN AMERICAN): >60 ML/MIN/1.73 M^2
EST. GFR  (NON AFRICAN AMERICAN): >60 ML/MIN/1.73 M^2
GLUCOSE SERPL-MCNC: 82 MG/DL
POTASSIUM SERPL-SCNC: 3.6 MMOL/L
SODIUM SERPL-SCNC: 143 MMOL/L

## 2018-12-31 PROCEDURE — G0378 HOSPITAL OBSERVATION PER HR: HCPCS | Mod: HCNC

## 2018-12-31 PROCEDURE — 80048 BASIC METABOLIC PNL TOTAL CA: CPT | Mod: HCNC

## 2018-12-31 PROCEDURE — 25000003 PHARM REV CODE 250: Mod: HCNC | Performed by: UROLOGY

## 2018-12-31 PROCEDURE — 63600175 PHARM REV CODE 636 W HCPCS: Mod: HCNC | Performed by: STUDENT IN AN ORGANIZED HEALTH CARE EDUCATION/TRAINING PROGRAM

## 2018-12-31 PROCEDURE — 25000003 PHARM REV CODE 250: Mod: HCNC | Performed by: STUDENT IN AN ORGANIZED HEALTH CARE EDUCATION/TRAINING PROGRAM

## 2018-12-31 PROCEDURE — A4216 STERILE WATER/SALINE, 10 ML: HCPCS | Mod: HCNC | Performed by: UROLOGY

## 2018-12-31 PROCEDURE — 36415 COLL VENOUS BLD VENIPUNCTURE: CPT | Mod: HCNC

## 2018-12-31 RX ORDER — CEPHALEXIN 500 MG/1
500 CAPSULE ORAL EVERY 6 HOURS
Qty: 28 CAPSULE | Refills: 0 | Status: SHIPPED | OUTPATIENT
Start: 2018-12-31 | End: 2019-01-07

## 2018-12-31 RX ADMIN — OXYBUTYNIN CHLORIDE 15 MG: 10 TABLET, FILM COATED, EXTENDED RELEASE ORAL at 09:12

## 2018-12-31 RX ADMIN — BUPROPION HYDROCHLORIDE 300 MG: 150 TABLET, EXTENDED RELEASE ORAL at 09:12

## 2018-12-31 RX ADMIN — LEVOTHYROXINE SODIUM 125 MCG: 125 TABLET ORAL at 06:12

## 2018-12-31 RX ADMIN — HEPARIN SODIUM 5000 UNITS: 5000 INJECTION, SOLUTION INTRAVENOUS; SUBCUTANEOUS at 06:12

## 2018-12-31 RX ADMIN — Medication 10 ML: at 12:12

## 2018-12-31 RX ADMIN — ASPIRIN 81 MG: 81 TABLET, COATED ORAL at 09:12

## 2018-12-31 RX ADMIN — Medication 1 CAPSULE: at 09:12

## 2018-12-31 RX ADMIN — FUROSEMIDE 40 MG: 40 TABLET ORAL at 09:12

## 2018-12-31 RX ADMIN — Medication 10 ML: at 10:12

## 2018-12-31 RX ADMIN — DOCUSATE SODIUM 100 MG: 100 CAPSULE, LIQUID FILLED ORAL at 09:12

## 2018-12-31 RX ADMIN — ATORVASTATIN CALCIUM 80 MG: 20 TABLET, FILM COATED ORAL at 09:12

## 2018-12-31 RX ADMIN — POTASSIUM CHLORIDE 20 MEQ: 1500 TABLET, EXTENDED RELEASE ORAL at 09:12

## 2018-12-31 RX ADMIN — FLUOXETINE 60 MG: 20 CAPSULE ORAL at 09:12

## 2018-12-31 RX ADMIN — Medication 10 ML: at 06:12

## 2018-12-31 RX ADMIN — PANTOPRAZOLE SODIUM 40 MG: 40 TABLET, DELAYED RELEASE ORAL at 09:12

## 2018-12-31 NOTE — ASSESSMENT & PLAN NOTE
62 yo F with spt admitted for IV antibiotics and PICC placement.    -Admit to urology  -PICC line in place, will likely remove today  -UC with pan sensitive proteus, will likely be able to send home on PO cephalosporin  -SPT exchanged, urine for culture with GNR  -Allergy consult for sensitivity testing but unable to perform will need follow up as outpatient  -Home meds  -SW consult for re initiation of HH

## 2018-12-31 NOTE — PROGRESS NOTES
[facility]  Urology  Progress Note    Patient Name: Tasha Hawley  MRN: [id number]  Admission Date: 12/28/2018  Hospital Length of Stay: 0 days  Code Status: Full Code   Attending Provider: Shireen Mayo MD   Primary Care Physician: Mesfin Hodges Ii, MD    Subjective:     HPI:  Tasha Hawley is a 63 y.o. female  with pmh of chronic pain, on chronic opiods, arthritis, anxiety, and a suprapubic catheter that was placed at the patients request for mixed incontinence and incomplete emptying     She had SUDS in 2016 which showed decreased sensation, urodynamic stress urinary incontinence, large bladder capacity, incomplete emptying. PVR was 650. She was managed with an indwelling catheter and then requested an SPT which was ultimately placed in 11/2016. She has been managing her bladder with the SPT since that time and has been seen multiple times for issues with drainage, sediment in the urine, and recurrent UTIs.      She has had multiple MDR UTIs in the past and has multiple severe allergies to antibiotics. She was recently set up for sensitivity testing but did not follow up. She has most recently been having gentamicin bladder washes with home health but is complaining of suprapubic pain and discomfort consistent with symptoms when she is having a UTI. Her last gentamicin bladder wash was on 12/24/18. No recent fevers or chills.    She was admitted for PICC line placement and initiation of IV antibiotics due to her resistance patterns and multiple severe antibiotic allergies as well as allergy sensitivity testing.       Interval History:   No acute events overnight  UC growing out pan sensitive proteus    Review of Systems  Objective:     Temp:  [96.5 °F (35.8 °C)-97.9 °F (36.6 °C)] 96.6 °F (35.9 °C)  Pulse:  [57-59] 57  Resp:  [16-20] 18  SpO2:  [95 %-96 %] 96 %  BP: ()/(44-58) 104/50     Body mass index is 28.38 kg/m².            Drains     Drain                 Suprapubic  Catheter 06/09/18 205 days         Suprapubic Catheter 12/28/18 0100 3 days                Physical Exam   Constitutional: She appears well-developed. No distress.   HENT:   Head: Normocephalic and atraumatic.   Eyes: Pupils are equal, round, and reactive to light. Right eye exhibits no discharge. Left eye exhibits no discharge.   Neck: Normal range of motion.   Abdominal: Soft. There is tenderness.   Spt draining clear yellow urine with some sediment     Musculoskeletal: Normal range of motion. She exhibits no edema.   Chronic back pain present on exam   Neurological: She is alert. No cranial nerve deficit.   Skin: Skin is warm and dry. She is not diaphoretic. No erythema.     Psychiatric: She has a normal mood and affect. Her behavior is normal.       Significant Labs:    BMP:  Recent Labs   Lab 12/28/18 2013 12/30/18  0824 12/31/18  0350    140 143   K 3.5 3.5 3.6    100 101   CO2 33* 32* 31*   BUN 12 11 12   CREATININE 1.0 0.9 0.8   CALCIUM 9.9 9.0 9.1       CBC:   Recent Labs   Lab 12/28/18 2013   WBC 6.21   HGB 12.3   HCT 38.1          Urine Culture:   Recent Labs   Lab 12/29/18  0042   LABURIN PROTEUS MIRABILIS  >100,000 cfu/ml       All pertinent labs results from the past 24 hours have been reviewed.    Significant Imaging:  All pertinent imaging results/findings from the past 24 hours have been reviewed.                  Assessment/Plan:     Urinary tract infection    62 yo F with spt admitted for IV antibiotics and PICC placement.    -Admit to urology  -PICC line in place, will likely remove today  -UC with pan sensitive proteus, will likely be able to send home on PO cephalosporin  -SPT exchanged, urine for culture with GNR  -Allergy consult for sensitivity testing but unable to perform will need follow up as outpatient  -Home meds  -SW consult for re initiation of HH           VTE Risk Mitigation (From admission, onward)        Ordered     heparin (porcine) injection 5,000 Units   Every 8 hours      12/29/18 0925     Place sequential compression device  Until discontinued      12/28/18 1940     Place MALLORIE hose  Until discontinued      12/28/18 1940     IP VTE LOW RISK PATIENT  Once      12/28/18 1940          Raciel Sebastian MD  Urology  [facility]

## 2018-12-31 NOTE — NURSING
Pt discharged home, awaiting ride. AAOX$. MD aware of low B/P, Pt is asymptomatic.Suprapubic cath intact, draining yellow urine. Home health service to resume. RX and D/C instructions given -- understanding verbalized.

## 2018-12-31 NOTE — SUBJECTIVE & OBJECTIVE
Interval History:   No acute events overnight  UC growing out pan sensitive proteus    Review of Systems  Objective:     Temp:  [96.5 °F (35.8 °C)-97.9 °F (36.6 °C)] 96.6 °F (35.9 °C)  Pulse:  [57-59] 57  Resp:  [16-20] 18  SpO2:  [95 %-96 %] 96 %  BP: ()/(44-58) 104/50     Body mass index is 28.38 kg/m².            Drains     Drain                 Suprapubic Catheter 06/09/18 205 days         Suprapubic Catheter 12/28/18 0100 3 days                Physical Exam   Constitutional: She appears well-developed. No distress.   HENT:   Head: Normocephalic and atraumatic.   Eyes: Pupils are equal, round, and reactive to light. Right eye exhibits no discharge. Left eye exhibits no discharge.   Neck: Normal range of motion.   Abdominal: Soft. There is tenderness.   Spt draining clear yellow urine with some sediment     Musculoskeletal: Normal range of motion. She exhibits no edema.   Chronic back pain present on exam   Neurological: She is alert. No cranial nerve deficit.   Skin: Skin is warm and dry. She is not diaphoretic. No erythema.     Psychiatric: She has a normal mood and affect. Her behavior is normal.       Significant Labs:    BMP:  Recent Labs   Lab 12/28/18 2013 12/30/18  0824 12/31/18  0350    140 143   K 3.5 3.5 3.6    100 101   CO2 33* 32* 31*   BUN 12 11 12   CREATININE 1.0 0.9 0.8   CALCIUM 9.9 9.0 9.1       CBC:   Recent Labs   Lab 12/28/18 2013   WBC 6.21   HGB 12.3   HCT 38.1          Urine Culture:   Recent Labs   Lab 12/29/18  0042   LABURIN PROTEUS MIRABILIS  >100,000 cfu/ml       All pertinent labs results from the past 24 hours have been reviewed.    Significant Imaging:  All pertinent imaging results/findings from the past 24 hours have been reviewed.

## 2018-12-31 NOTE — PLAN OF CARE
12/31/18 1135   Post-Acute Status   Post-Acute Authorization Home Health/Hospice   Home Health/Hospice Status Authorization Obtained     SW was informed by the resident that the pt will dc today with a resumption of HH.  Pt has used [facility] in the past.  SW sent them reg fs, HP, and note.  [facility] informed them that the pt was in OBS only.  They do not need new orders.  They will see the pt either tomorrow or the next day.    Lorie Jorge, [facility] x [phone]

## 2018-12-31 NOTE — PLAN OF CARE
Problem: Adult Inpatient Plan of Care  Goal: Plan of Care Review  Outcome: Ongoing (interventions implemented as appropriate)  Quiet hours. No complaints. Sp cath remains patent. Safety maintained.

## 2019-01-01 LAB
BACTERIA UR CULT: NORMAL
BACTERIA UR CULT: NORMAL

## 2019-01-01 RX ORDER — NITROFURANTOIN MACROCRYSTALS 50 MG/1
100 CAPSULE ORAL EVERY 6 HOURS
Qty: 40 CAPSULE | Refills: 0 | Status: SHIPPED | OUTPATIENT
Start: 2019-01-01 | End: 2019-01-06

## 2019-01-01 NOTE — DISCHARGE SUMMARY
[facility]  Urology  Discharge Summary      Patient Name: Tasha Hawley  MRN: [id number]  Admission Date: 12/28/2018  Hospital Length of Stay: 0 days  Discharge Date and Time: 12/31/2018  3:38 PM  Attending Physician: Shireen Mayo MD  Discharging Provider: Raciel Sebastian MD  Primary Care Physician: Mesfin Hodges Ii, MD    HPI:   Tasha Hawley is a 63 y.o. female  with pmh of chronic pain, on chronic opiods, arthritis, anxiety, and a suprapubic catheter that was placed at the patients request for mixed incontinence and incomplete emptying     She had SUDS in 2016 which showed decreased sensation, urodynamic stress urinary incontinence, large bladder capacity, incomplete emptying. PVR was 650. She was managed with an indwelling catheter and then requested an SPT which was ultimately placed in 11/2016. She has been managing her bladder with the SPT since that time and has been seen multiple times for issues with drainage, sediment in the urine, and recurrent UTIs.      She has had multiple MDR UTIs in the past and has multiple severe allergies to antibiotics. She was recently set up for sensitivity testing but did not follow up. She has most recently been having gentamicin bladder washes with home health but is complaining of suprapubic pain and discomfort consistent with symptoms when she is having a UTI. Her last gentamicin bladder wash was on 12/24/18. No recent fevers or chills.     She was admitted for PICC line placement and initiation of IV antibiotics due to her resistance patterns and multiple severe antibiotic allergies as well as allergy sensitivity testing.             * No surgery found *     Indwelling Lines/Drains at time of discharge:   Lines/Drains/Airways     Drain                 Suprapubic Catheter 06/09/18 206 days         Suprapubic Catheter 12/28/18 0100 4 days                Hospital Course     She was admitted for IV antibiotics due to symptomatic UTI and history of  MDR UTIs and multiple antibiotic allergies. He suprapubic catheter was exchanged and sent for culture. She was started on empiric gentamicin and a PICC line was placed with the expectation that she would be sent home on IV antibiotics. Her urine culture resulted in pan sensitive proteus and she was sent home on oral keflex therapy. He PICC line was removed and she was discharged home in good condition with drastic improvement in her symptoms.     Consults:   Consults (From admission, onward)        Status Ordering Provider     Inpatient consult to Allergy  Once     Provider:  (Not yet assigned)    Completed ANUJA PUGH     Inpatient consult to PICC team (NIAS)  Once     Provider:  (Not yet assigned)    Completed ANUJA PUGH          Significant Diagnostic Studies: none     Pending Diagnostic Studies:     None          Final Active Diagnoses:    Diagnosis Date Noted POA    PRINCIPAL PROBLEM:  Urinary tract infection [N39.0] 12/28/2018 Yes      Problems Resolved During this Admission:       Discharged Condition: good    Disposition: Home or Self Care    Follow Up:  Follow-up Information     Shireen Mayo MD In 2 weeks.    Specialty:  Urology  Contact information:  [address] [facility] [id number] [phone]             Thierry viviana - Allergy/ Immunology In 1 week.    Specialty:  Allergy  Why:  for allergy testing and antibiotic desensitization  Contact information:  [address] [facility] [address] [phone]  Additional information:  [facility].           [facility]    Specialties:  DME Provider, Home Health Services  Why:  Home Health: The nurse will see the patient tomorrow or the day after. They will contact the patient.  Contact information:  [address] [place] [address]  Enzo WARREN [address] [phone]                 Patient Instructions:      Ambulatory referral to Outpatient Case Management   Referral Priority: Routine  Referral Type: Consultation   Referral Reason: Specialty Services Required   Number of Visits Requested: 1     Notify your health care provider if you experience any of the following:  temperature >100.4     Notify your health care provider if you experience any of the following:  persistent nausea and vomiting or diarrhea     Notify your health care provider if you experience any of the following:  severe uncontrolled pain     Notify your health care provider if you experience any of the following:  difficulty breathing or increased cough     Notify your health care provider if you experience any of the following:  severe persistent headache     Notify your health care provider if you experience any of the following:  worsening rash     Notify your health care provider if you experience any of the following:  persistent dizziness, light-headedness, or visual disturbances     Notify your health care provider if you experience any of the following:  increased confusion or weakness     Activity as tolerated     Medications:  Reconciled Home Medications:      Medication List      START taking these medications    cephALEXin 500 MG capsule  Commonly known as:  KEFLEX  Take 1 capsule (500 mg total) by mouth every 6 (six) hours. for 7 days        CONTINUE taking these medications    aspirin 81 MG EC tablet  Commonly known as:  ECOTRIN  Take 1 tablet (81 mg total) by mouth once daily.     atorvastatin 80 MG tablet  Commonly known as:  LIPITOR  TAKE 1 TABLET BY MOUTH DAILY     buPROPion 300 MG 24 hr tablet  Commonly known as:  WELLBUTRIN XL  Take 1 tablet (300 mg total) by mouth once daily.     butalbital-aspirin-caffeine -40 mg -40 mg Cap  Commonly known as:  FIORINAL  Take 1 capsule by mouth 3 (three) times daily as needed.     clopidogrel 75 mg tablet  Commonly known as:  PLAVIX  Take 75 mg by mouth once daily.     DOC-Q-LACE 100 MG capsule  Generic drug:  docusate sodium  TAKE ONE CAPSULE BY MOUTH THREE  TIMES DAILY AS NEEDED FOR CONSTIPATION     docosanol 10 % Crea  Commonly known as:  ABREVA  Apply 1 application topically 2 (two) times daily.     FLUoxetine 20 MG capsule  Take 3 capsules (60 mg total) by mouth once daily.     furosemide 40 MG tablet  Commonly known as:  LASIX  Take 1 tablet (40 mg total) by mouth 2 (two) times daily.     HYDROcodone-acetaminophen  mg per tablet  Commonly known as:  NORCO  Take 2 tablets by mouth every 4 (four) hours as needed for Pain.     Lactobacillus acidophilus 10 billion cell Cap  Commonly known as:  PROBIOTIC  Take 1 capsule by mouth once daily.     levothyroxine 125 MCG tablet  Commonly known as:  SYNTHROID  Take 1 tablet (125 mcg total) by mouth before breakfast.     lidocaine 5 %  Commonly known as:  LIDODERM  APPLY 1 PATCH DAILY AND WEAR FOR A MAXIMUM OF 12 HOURS     ondansetron 4 MG Tbdl  Commonly known as:  ZOFRAN-ODT  Take 1 tablet (4 mg total) by mouth every 8 (eight) hours as needed.     ondansetron 8 MG tablet  Commonly known as:  ZOFRAN  TAKE ONE TABLET BY MOUTH EVERY TWELVE HOURS AS NEEDED FOR NAUSEA     oxybutynin 15 MG Tr24  Commonly known as:  DITROPAN XL  Take 1 tablet (15 mg total) by mouth once daily.     pantoprazole 40 MG tablet  Commonly known as:  PROTONIX  Take 1 tablet (40 mg total) by mouth once daily.     potassium chloride SA 20 MEQ tablet  Commonly known as:  K-DUR,KLOR-CON  Take 20 mEq by mouth 2 (two) times daily.     potassium citrate 10 mEq (1,080 mg) Tbsr  Commonly known as:  UROCIT-K  Take 1 tablet (10 mEq total) by mouth 3 (three) times daily with meals.     QUEtiapine 50 MG tablet  Commonly known as:  SEROQUEL  1 tab in AM and 2 at bedtime     ranitidine 150 MG capsule  Commonly known as:  ZANTAC  Take 1 capsule (150 mg total) by mouth 2 (two) times daily.     SENNA LAX ORAL  Take 20 mg by mouth every evening.     tiZANidine 4 MG tablet  Commonly known as:  ZANAFLEX     traZODone 100 MG tablet  Commonly known as:  DESYREL  Take 2  tablets (200 mg total) by mouth every evening.        STOP taking these medications    lamoTRIgine 25 MG tablet  Commonly known as:  LAMICTAL            Time spent on the discharge of patient: 25 minutes    Raciel Sebastian MD  Urology  [facility]    Agree with the above note.

## 2019-01-02 ENCOUNTER — TELEPHONE (OUTPATIENT)
Dept: UROLOGY | Facility: CLINIC | Age: 64
End: 2019-01-02

## 2019-01-02 ENCOUNTER — TELEPHONE (OUTPATIENT)
Dept: PSYCHIATRY | Facility: CLINIC | Age: 64
End: 2019-01-02

## 2019-01-02 NOTE — TELEPHONE ENCOUNTER
The patient reported lower extremity peyman when she sets her feet on the ground, it goes up her legs and into her suprapubic area.  Referred her to her pimary to directed her to the ER, as it does not sound like it is related to the bladder.

## 2019-01-02 NOTE — TELEPHONE ENCOUNTER
----- Message from Antolin Mendoza MA sent at 1/2/2019  8:17 AM CST -----  Contact: [phone]  Pt called in requesting a call back stated she is having  surgery and needs to be off a Rx

## 2019-01-02 NOTE — TELEPHONE ENCOUNTER
----- Message from Waldo Winters sent at 1/2/2019 10:51 AM CST -----  Contact: Saray with [facility]:[phone]  .Needs Advice    Reason for call:Saray boyce [facility] called and states the pt can home on Monday and they didn't get any orders. Saray states the pt is still having issues with uti and pain.Saray also states she would like to know if  would like for the pt to go to the emergency room.         Communication Preference:Saray with [facility]:[phone]    Additional Information:

## 2019-01-02 NOTE — TELEPHONE ENCOUNTER
Spoke w/ Saray ([facility]  nurse), states pt c/o uncontrollable pain in bladder after hospital discharge on Monday. [facility] pt informed her of 2 bacteria found in urine culture and that she is currently taking oral abx.  nurse unable to visit pt today (1/2/2019) without  authorization, which is processing today for all  pts. Wanting to notify Dr. Mayo of pt's complaints and asking if any new orders were needed.

## 2019-01-02 NOTE — TELEPHONE ENCOUNTER
----- Message from Karoline Land MA sent at 1/2/2019  8:15 AM CST -----  Contact: [phone]  Needs Advice    Reason for call: pt needs a 2 week hospital follow up and she wants to talk to julissa or dr dickinson about her being in the hospital        Communication Preference: [phone]    Additional Information:  Please call

## 2019-01-02 NOTE — TELEPHONE ENCOUNTER
Pt said she is scheduled for allergy testing and has to get off Seroquel.  I suggested 1 at bedtime x3 and then discontinue.

## 2019-01-04 ENCOUNTER — PATIENT MESSAGE (OUTPATIENT)
Dept: ALLERGY | Facility: CLINIC | Age: 64
End: 2019-01-04

## 2019-01-07 ENCOUNTER — TELEPHONE (OUTPATIENT)
Dept: UROLOGY | Facility: CLINIC | Age: 64
End: 2019-01-07

## 2019-01-07 NOTE — TELEPHONE ENCOUNTER
Pt states she has been taking abx for over 5 days with no relief. States she has vomited, has gross hematuria, urtheral burning and bladder pressure. Advised to go to nearest ER, pt refuses. States she wants to speak w/ Dr. Mayo about what can be done and possibly removing bladder.

## 2019-01-07 NOTE — TELEPHONE ENCOUNTER
----- Message from Prerna Sesay sent at 1/7/2019 11:44 AM CST -----  Contact: PT  PT is requesting a nurse to call her back regarding a few concerns.     Callback: [phone]

## 2019-01-08 NOTE — TELEPHONE ENCOUNTER
----- Message from Elida Cummings sent at 1/8/2019  3:17 PM CST -----  Contact: Saray with [facility]#[phone]  Patient Requesting Sooner Appointment.     Reason for sooner appt.:Ptr has UTI and she been taking medication for 8 days but she's still experiencing burning,leakages,blood clot in urine and pelvic pressure   When is the first available appointment?01/14/19  Communication Preference:call  Additional Information:

## 2019-01-09 ENCOUNTER — OFFICE VISIT (OUTPATIENT)
Dept: ALLERGY | Facility: CLINIC | Age: 64
End: 2019-01-09
Payer: MEDICARE

## 2019-01-09 VITALS — WEIGHT: 162.69 LBS | BODY MASS INDEX: 27.77 KG/M2 | HEIGHT: 64 IN

## 2019-01-09 DIAGNOSIS — Z88.0 HISTORY OF PENICILLIN ALLERGY: Primary | ICD-10-CM

## 2019-01-09 PROCEDURE — 95076 PR INGESTION CHALLENGE TEST; INITIAL 120 MIN: ICD-10-PCS | Mod: HCNC,GC,S$GLB, | Performed by: PEDIATRICS

## 2019-01-09 PROCEDURE — 95018 ALL TSTG PERQ&IQ DRUGS/BIOL: CPT | Mod: HCNC,GC,S$GLB, | Performed by: PEDIATRICS

## 2019-01-09 PROCEDURE — 99499 NO LOS: ICD-10-PCS | Mod: HCNC,GC,S$GLB, | Performed by: PEDIATRICS

## 2019-01-09 PROCEDURE — 99999 PR PBB SHADOW E&M-EST. PATIENT-LVL V: CPT | Mod: PBBFAC,HCNC,GC, | Performed by: PEDIATRICS

## 2019-01-09 PROCEDURE — 99499 UNLISTED E&M SERVICE: CPT | Mod: HCNC,GC,S$GLB, | Performed by: PEDIATRICS

## 2019-01-09 PROCEDURE — 95076 INGEST CHALLENGE INI 120 MIN: CPT | Mod: HCNC,GC,S$GLB, | Performed by: PEDIATRICS

## 2019-01-09 PROCEDURE — 95018 PR PERQ&IC ALLG TEST DRUGS/BIOL: ICD-10-PCS | Mod: HCNC,GC,S$GLB, | Performed by: PEDIATRICS

## 2019-01-09 PROCEDURE — 99999 PR PBB SHADOW E&M-EST. PATIENT-LVL V: ICD-10-PCS | Mod: PBBFAC,HCNC,GC, | Performed by: PEDIATRICS

## 2019-01-09 RX ORDER — POTASSIUM CHLORIDE 750 MG/1
20 TABLET, EXTENDED RELEASE ORAL 3 TIMES DAILY
Refills: 0 | COMMUNITY
Start: 2018-12-10 | End: 2019-01-11

## 2019-01-09 NOTE — PROGRESS NOTES
ALLERGY & IMMUNOLOGY CLINIC -FOLLOW UP     HISTORY OF PRESENT ILLNESS     Patient ID: Tasha Hawley is a 63 y.o. female    CC: follow up for penicillin skin testing     HPI: Ms. Hawley is a 64 yo female with multiple medical problems including lumbar radiculopathy, hypothyroid, and neurogenic bladder with suprapubic catheter dependence here for evaluation of drug allergy. She has a history of recurrent UTIs and is being referred from her urologist to evaluate penicillin allergy (she has a history of multiple other drug allergies as well, please see original consult note dated 12/4/18). She does not remember the details of the majority of her reactions, but she does remember the following:      She was told she was penicillin allergic as a child; she remembers being told she had vomiting, rash and shortness of breath with penicillins. She believes she was given penicillin as an adult several years ago, and she broke out in a rash. She does not specifically remember the last time she had penicillin or whether she has had amoxicillin. She tolerates keflex without problems.     She was initially seen in early December with plans to return 1 week later for penicillin skin testing. However, she did not return for that appointment. She was subsequently hospitalized in late December for a UTI; allergy was consulted (Dr. Montenegro), but penicillin skin testing was unable to be performed because the patient was on seroquel. It was recommended she stop this for today's appointment. Her psychiatrist gave her an appropriate weaning schedule for this. Today she reports she has been off antihistamines and seroquel for the past 8 days. She is currently on keflex and macrobid for treatment of UTI and notes these medications have made her very nauseated and caused vomiting yesterday, but she is otherwise feeling well today. No cough, wheezing or SOB.          REVIEW OF SYSTEMS     CONST: no fever, chills, or weight loss  NEURO:  "no headache  EYES: no discharge, no pruritus, no erythema  NOSE: no congestion, no rhinorrhea, no discharge, no itching, no sneezing  PULM: no SOB, no wheezing, no cough  CV: no CP, no palpitations, no leg swelling  GI: +nausea/vomiting, lower abdominal pain, no diarrhea  URO: see HPI, currently being treated for UTI  DERM: no rashes, no skin breaks     MEDICAL HISTORY     No interval changes to histories     PHYSICAL EXAM     VS: Ht 5' 4" (1.626 m)   Wt 73.8 kg (162 lb 11.2 oz)   LMP  (LMP Unknown)   BMI 27.93 kg/m²   GENERAL: AAOx4, NAD, well-appearing, cooperative  EYES: EOMI, no conjunctival injection, no discharge, no infraorbital shiners  NOSE: no discharge or stringing mucous  ORAL: MMM  NECK: supple, trachea midline, no thyromegaly, no LAD  LUNGS:CTAB, no w/r/c, no increased WOB  HEART: RRR, normal S1/S2, no m/g/r  EXTREMITIES: edema to bilateral lower extremities   DERM: no rashes, no skin breaks  NEURO: uses a wheelchair; needs assistance with walking     PENICILLIN SKIN TESTING     TEST RESULTS  Prick (mm wheal / mm flare)  Intradermal   Saline (NS/0.9%):  Negative    Negative  Pen G (10,000 U/mL):  Negative     Negative in duplicate   Pre-Pen (standard):  Negative    Negative in duplicate   Histamine (1 mg/mL):  7 mm/10 mm    N/A    INTERPRETATION: Negative for evidence of penicillin allergy.  Test exhibited good  negative and positive controls.  Positive histamine reactions were 7mm (wheal) and  10 mm (flare) for prick.  0.02 mL of each substance was injected for each intradermal test.    NOTE: This test assesses the risk for an IgE-mediated immediate typehypersensitivity  reaction following administration of penicillin.  The spectrum of symptoms this test will  assess the risk for includes: urticaria, angioedema, laryngeal edema, bronchospasm,  and shock.  Test does not assess the risk for symptoms produced by non-immunologic  mechanisms or other immunologic mechanisms including serum sickness, " interstitial  nephritis, immunologic cytopenias, Velez-Duran syndrome, erythema multiforme,  toxic epidermal necrolysis, and morbiliform eruptions.    Patient history of penicillin allergy, now s/p PCN skin testing: There is no evidence of penicillin-specific IgE at the time of testing; see test result section for details.  97-99% of patients with negative intradermal tests will tolerate penicillin administration without the risk of an immediate-type hypersensitivity reaction.  Please note that this does not determine the patient's risk for future development of penicillin-specific IgE, as some patient's may become sensitized due to repeated doses of penicillin.  I will remove penicillin allergy from patient's chart.    Total number of skin tests placed: 9      ORAL AMOXICILLIN CHALLENGE      AMOXICILLIN ORAL CHALLENGE:   After review of the risks and benefits of the oral amoxicillin challenge, the patient was given 250 mg amoxicillin after her penicillin skin testing was negative. She ingested this without incident. She was then monitored for another hour and did not develop any IgE mediated symptoms.      Interpretation: Patient passed an oral amoxicillin challenge indicating she does not have an IgE mediated allergy to penicillins. Extensive discussion with patient re: the results of her testing today. We discussed that she can safely use penicillin based antibiotics for common infections and that I would remove this from her allergy list. We reviewed that our testing does not rule out the possibility of non-immunologic mechanisms or other immunologic mechanisms including serum sickness, interstitial nephritis, immunologic cytopenias, Velez-Duran syndrome, erythema multiforme, toxic epidermal necrolysis, and morbiliform eruptions.    Total time spent on amoxicillin challenge: 1 hr and 5 minutes      CHART REVIEW     Reviewed allergy clinic notes      ASSESSMENT & PLAN     Tasha Hawley is a 63 y.o.  female with     History of penicillin allergy-negative penicillin skin testing today and passed an oral amoxicillin challenge without evidence of IgE mediated symptoms, indicating she does not have a life threatening allergy to penicillins. Penicillin allergy will be removed from the patients chart. Penicillin skin testing and oral challenge do not assess the risk for non-immunologic adverse drug reactions or other immunologic drug reactions including serum sickness, interstitial nephritis, immunologic cytopenias, Velez-Duran syndrome, erythema multiforme, toxic epidermal necrolysis, and morbiliform eruptions.    Follow up: as needed to investigate multiple other drug allergies      Discussed with Dr. Fercho Sanders MD  Allergy/Immunology Fellow

## 2019-01-09 NOTE — LETTER
January 9, 2019        Shireen Mayo MD  [address]  [place] [address]             [facility] - Allergy/ Immunology  [address]  [place] [address]  Phone: [phone]  Fax: [phone]   Patient: Tasha Hawley   MR Number: [id number]   YOB: 1955   Date of Visit: 1/9/2019       Dear Dr. Mayo:    Thank you for referring Tasha Hawley to me for evaluation. Below are the relevant portions of my assessment and plan of care.     Tasha Hawley is a 63 y.o. female with      History of penicillin allergy-negative penicillin skin testing today and passed an oral amoxicillin challenge without evidence of IgE mediated symptoms, indicating she does not have a life threatening allergy to penicillins. Penicillin allergy will be removed from the patients chart. Penicillin skin testing and oral challenge do not assess the risk for non-immunologic adverse drug reactions or other immunologic drug reactions including serum sickness, interstitial nephritis, immunologic cytopenias, Velez-Duran syndrome, erythema multiforme, toxic epidermal necrolysis, and morbiliform eruptions.     Follow up: as needed to investigate multiple other drug allergies      Discussed with Dr. Fercho Sanders MD  Allergy/Immunology Fellow         If you have questions, please do not hesitate to call me. I look forward to following Tasha along with you.      Sincerely,      Leslie Sanders MD           CC  No Recipients

## 2019-01-09 NOTE — PATIENT INSTRUCTIONS
Your penicillin skin testing was negative and you passed an oral amoxicillin challenge today. This indicates that you do not have an immediate onset, serious/life threatening allergy to penicillin based antibiotics. Penicillin allergy will be removed from your allergy list, and this is a suitable option for treatment of future infections. This testing does not rule out the possibility of delayed type drug reactions, such as a rash that develops 24-72 hours after taking a medication. These reactions are typically not life threatening.     If you develop any concerning symptoms later today, please contact the on call allergy pager at [phone].

## 2019-01-11 ENCOUNTER — OFFICE VISIT (OUTPATIENT)
Dept: INTERNAL MEDICINE | Facility: CLINIC | Age: 64
End: 2019-01-11
Payer: MEDICARE

## 2019-01-11 VITALS
DIASTOLIC BLOOD PRESSURE: 60 MMHG | HEIGHT: 64 IN | SYSTOLIC BLOOD PRESSURE: 80 MMHG | BODY MASS INDEX: 27.93 KG/M2 | OXYGEN SATURATION: 98 % | HEART RATE: 52 BPM

## 2019-01-11 DIAGNOSIS — F11.20 NARCOTIC DEPENDENCY, CONTINUOUS: Chronic | ICD-10-CM

## 2019-01-11 DIAGNOSIS — Z12.39 SCREENING FOR MALIGNANT NEOPLASM OF BREAST: ICD-10-CM

## 2019-01-11 DIAGNOSIS — N31.9 NEUROGENIC BLADDER: Chronic | ICD-10-CM

## 2019-01-11 DIAGNOSIS — N32.81 DETRUSOR INSTABILITY: ICD-10-CM

## 2019-01-11 DIAGNOSIS — G89.4 CHRONIC PAIN SYNDROME: Chronic | ICD-10-CM

## 2019-01-11 DIAGNOSIS — G47.01 INSOMNIA DUE TO MEDICAL CONDITION: Chronic | ICD-10-CM

## 2019-01-11 DIAGNOSIS — I77.9 BILATERAL CAROTID ARTERY DISEASE, UNSPECIFIED TYPE: ICD-10-CM

## 2019-01-11 DIAGNOSIS — Z93.59 SUPRAPUBIC CATHETER: Chronic | ICD-10-CM

## 2019-01-11 DIAGNOSIS — N39.0 RECURRENT UTI (URINARY TRACT INFECTION): Primary | ICD-10-CM

## 2019-01-11 DIAGNOSIS — F33.0 MAJOR DEPRESSIVE DISORDER, RECURRENT, MILD: ICD-10-CM

## 2019-01-11 DIAGNOSIS — G95.9 CERVICAL MYELOPATHY: ICD-10-CM

## 2019-01-11 DIAGNOSIS — L82.1 SK (SEBORRHEIC KERATOSIS): ICD-10-CM

## 2019-01-11 PROCEDURE — 3008F BODY MASS INDEX DOCD: CPT | Mod: CPTII,HCNC,S$GLB, | Performed by: INTERNAL MEDICINE

## 2019-01-11 PROCEDURE — 99499 RISK ADDL DX/OHS AUDIT: ICD-10-PCS | Mod: S$GLB,,, | Performed by: INTERNAL MEDICINE

## 2019-01-11 PROCEDURE — 99214 PR OFFICE/OUTPT VISIT, EST, LEVL IV, 30-39 MIN: ICD-10-PCS | Mod: HCNC,S$GLB,, | Performed by: INTERNAL MEDICINE

## 2019-01-11 PROCEDURE — 3008F PR BODY MASS INDEX (BMI) DOCUMENTED: ICD-10-PCS | Mod: CPTII,HCNC,S$GLB, | Performed by: INTERNAL MEDICINE

## 2019-01-11 PROCEDURE — 99999 PR PBB SHADOW E&M-EST. PATIENT-LVL IV: CPT | Mod: PBBFAC,HCNC,, | Performed by: INTERNAL MEDICINE

## 2019-01-11 PROCEDURE — 99999 PR PBB SHADOW E&M-EST. PATIENT-LVL IV: ICD-10-PCS | Mod: PBBFAC,HCNC,, | Performed by: INTERNAL MEDICINE

## 2019-01-11 PROCEDURE — 99214 OFFICE O/P EST MOD 30 MIN: CPT | Mod: HCNC,S$GLB,, | Performed by: INTERNAL MEDICINE

## 2019-01-11 PROCEDURE — 99499 UNLISTED E&M SERVICE: CPT | Mod: S$GLB,,, | Performed by: INTERNAL MEDICINE

## 2019-01-11 RX ORDER — OXYBUTYNIN CHLORIDE 15 MG/1
15 TABLET, EXTENDED RELEASE ORAL DAILY
Qty: 30 TABLET | Refills: 11 | Status: SHIPPED | OUTPATIENT
Start: 2019-01-11 | End: 2019-11-07 | Stop reason: SDUPTHER

## 2019-01-11 NOTE — PROGRESS NOTES
Subjective:       Patient ID: Tasha Hawley is a 63 y.o. female.    Chief Complaint: Hospital Follow Up    HPI - Ms Hawley was admitted 12/28-31 for a PICC line and IV abx for chronic recurrent UTI's.  She also was desensitized to penicillin.  She still has the suprapubic catheter and is talking to urology about a cystectomy.  She wanted to get a second opinion, but is having trouble lining that up.  She's not taking zofran and would rather have phenergan. Painful SK on sock line; wants to see Derm for that.  She's long overdue for mammography, and I managed to convince her to get one today.  Sleep has greatly improved!  She's getting into bed at 10-11pm and sleeping through the night (vs prior history of going to bed at 3am and awakening by 7).  She's not a smoker.      PMH:  Chronic insomnia.  Chronic low back pain with narcotic use.  Indwelling narcotic pump  History CVA with dysarthria  Neurogenic bladder, with recurrent UTI's.  SP catheter placed Nov 2016  Hypothyroidism, stable  CECI, not on CPAP  CAD, with ACS in Jan 2016 - subtot mid LAD lesion without PCI; ischemic CM with EF 40% and chronic LE edema. Followed by [facility] cardiology  Anxiety and Depression  Chronic constipation, likely d/t ongoing narcotic use  Intermittent nausea  Improving LE edema     Meds: Reviewed and reconciled in EPIC with patient during visit today.     Review of Systems   Constitutional: Positive for fatigue.   HENT: Negative for congestion.    Respiratory: Negative for shortness of breath.    Cardiovascular: Negative for chest pain.   Gastrointestinal: Positive for nausea.   Genitourinary: Positive for difficulty urinating.   Musculoskeletal: Positive for arthralgias and back pain.   Skin: Negative for rash.   Neurological: Negative for headaches.   Psychiatric/Behavioral: Negative for sleep disturbance.       Objective:      Physical Exam   Constitutional: She appears well-nourished. No distress.   HENT:   Head:  Normocephalic and atraumatic.   Musculoskeletal: She exhibits edema (2+ to ankles).   Neurological: She is alert.   Skin: She is not diaphoretic.   Psychiatric: She has a normal mood and affect.       Assessment:       1. Recurrent UTI (urinary tract infection)    2. Detrusor instability    3. Neurogenic bladder    4. Suprapubic catheter    5. Chronic pain syndrome    6. Insomnia due to medical condition    7. Major depressive disorder, recurrent, mild    8. Bilateral carotid artery disease, unspecified type    9. Narcotic dependency, continuous    10. Cervical myelopathy    11. SK (seborrheic keratosis)    12. Screening for malignant neoplasm of breast        Plan:       Tasha was seen today for hospital follow up.    Diagnoses and all orders for this visit:    Recurrent UTI (urinary tract infection) - we discussed pro's and con's of cystectomy.  Management per uro    Detrusor instability - refilling  -     oxybutynin (DITROPAN XL) 15 MG TR24; Take 1 tablet (15 mg total) by mouth once daily.    Neurogenic bladder - stable, treated by uro    Suprapubic catheter    Chronic pain syndrome - stable, well-controlled at present    Insomnia due to medical condition = greatly improved.  Stay the course    Major depressive disorder, recurrent, mild    Bilateral carotid artery disease, unspecified type - noted in chart    Narcotic dependency, continuous    Cervical myelopathy    SK (seborrheic keratosis)  -     Ambulatory consult to Dermatology    Screening for malignant neoplasm of breast  -     Mammo Digital Screening Bilat; Future    rtc 3 months.    PAPO Hodges MD MPH  Staff Internist

## 2019-01-11 NOTE — TELEPHONE ENCOUNTER
Called [facility]  office at [phone], gave verbal order, per Dr. Mayo, to have SN go out to pt's home ASAP for sptube change and collection of urine specimen from new catheter to send to lab for UA and C&S.

## 2019-01-11 NOTE — TELEPHONE ENCOUNTER
----- Message from Sis Calixto sent at 1/11/2019 12:23 PM CST -----  Contact: pt: [phone]  Needs Advice    Reason for call: pt stated she's experiencing burning and pressure and would like to speak with nurse         Communication Preference: pt: [phone]

## 2019-01-14 ENCOUNTER — HOSPITAL ENCOUNTER (OUTPATIENT)
Dept: RADIOLOGY | Facility: HOSPITAL | Age: 64
Discharge: HOME | End: 2019-01-14
Attending: INTERNAL MEDICINE
Payer: MEDICARE

## 2019-01-14 ENCOUNTER — OFFICE VISIT (OUTPATIENT)
Dept: UROLOGY | Facility: CLINIC | Age: 64
End: 2019-01-14
Payer: MEDICARE

## 2019-01-14 ENCOUNTER — TELEPHONE (OUTPATIENT)
Dept: INTERNAL MEDICINE | Facility: CLINIC | Age: 64
End: 2019-01-14

## 2019-01-14 ENCOUNTER — HOSPITAL ENCOUNTER (OUTPATIENT)
Dept: RADIOLOGY | Facility: HOSPITAL | Age: 64
Discharge: HOME | End: 2019-01-14
Attending: UROLOGY
Payer: MEDICARE

## 2019-01-14 VITALS
DIASTOLIC BLOOD PRESSURE: 54 MMHG | WEIGHT: 160 LBS | SYSTOLIC BLOOD PRESSURE: 88 MMHG | HEIGHT: 64 IN | HEART RATE: 47 BPM | BODY MASS INDEX: 27.31 KG/M2

## 2019-01-14 DIAGNOSIS — R11.0 NAUSEA: ICD-10-CM

## 2019-01-14 DIAGNOSIS — Z93.59 SUPRAPUBIC CATHETER: Chronic | ICD-10-CM

## 2019-01-14 DIAGNOSIS — N31.9 NEUROGENIC BLADDER: Chronic | ICD-10-CM

## 2019-01-14 DIAGNOSIS — Z12.39 SCREENING FOR MALIGNANT NEOPLASM OF BREAST: ICD-10-CM

## 2019-01-14 DIAGNOSIS — N39.0 RECURRENT UTI: ICD-10-CM

## 2019-01-14 DIAGNOSIS — N39.0 RECURRENT UTI: Primary | ICD-10-CM

## 2019-01-14 DIAGNOSIS — R33.9 URINARY RETENTION: Chronic | ICD-10-CM

## 2019-01-14 PROCEDURE — 77067 SCR MAMMO BI INCL CAD: CPT | Mod: TC,HCNC

## 2019-01-14 PROCEDURE — 99999 PR PBB SHADOW E&M-EST. PATIENT-LVL IV: CPT | Mod: PBBFAC,HCNC,, | Performed by: UROLOGY

## 2019-01-14 PROCEDURE — 76770 US EXAM ABDO BACK WALL COMP: CPT | Mod: 26,HCNC,, | Performed by: INTERNAL MEDICINE

## 2019-01-14 PROCEDURE — 99213 OFFICE O/P EST LOW 20 MIN: CPT | Mod: HCNC,S$GLB,, | Performed by: UROLOGY

## 2019-01-14 PROCEDURE — 3008F PR BODY MASS INDEX (BMI) DOCUMENTED: ICD-10-PCS | Mod: CPTII,HCNC,S$GLB, | Performed by: UROLOGY

## 2019-01-14 PROCEDURE — 76770 US EXAM ABDO BACK WALL COMP: CPT | Mod: TC,HCNC

## 2019-01-14 PROCEDURE — 77067 SCR MAMMO BI INCL CAD: CPT | Mod: 26,HCNC,, | Performed by: RADIOLOGY

## 2019-01-14 PROCEDURE — 76770 US KIDNEY: ICD-10-PCS | Mod: 26,HCNC,, | Performed by: INTERNAL MEDICINE

## 2019-01-14 PROCEDURE — 99213 PR OFFICE/OUTPT VISIT, EST, LEVL III, 20-29 MIN: ICD-10-PCS | Mod: HCNC,S$GLB,, | Performed by: UROLOGY

## 2019-01-14 PROCEDURE — 77067 MAMMO DIGITAL SCREENING BILAT WITH CAD: ICD-10-PCS | Mod: 26,HCNC,, | Performed by: RADIOLOGY

## 2019-01-14 PROCEDURE — 99999 PR PBB SHADOW E&M-EST. PATIENT-LVL IV: ICD-10-PCS | Mod: PBBFAC,HCNC,, | Performed by: UROLOGY

## 2019-01-14 PROCEDURE — 3008F BODY MASS INDEX DOCD: CPT | Mod: CPTII,HCNC,S$GLB, | Performed by: UROLOGY

## 2019-01-14 RX ORDER — PROMETHAZINE HYDROCHLORIDE 25 MG/1
25 TABLET ORAL EVERY 6 HOURS PRN
Qty: 30 TABLET | Refills: 0 | Status: SHIPPED | OUTPATIENT
Start: 2019-01-14 | End: 2019-01-14 | Stop reason: SDUPTHER

## 2019-01-14 RX ORDER — PROMETHAZINE HYDROCHLORIDE 25 MG/1
25 TABLET ORAL EVERY 6 HOURS PRN
Qty: 60 TABLET | Refills: 4 | Status: SHIPPED | OUTPATIENT
Start: 2019-01-14 | End: 2019-01-17 | Stop reason: SDUPTHER

## 2019-01-14 NOTE — TELEPHONE ENCOUNTER
----- Message from Zoya Jarvis sent at 1/14/2019 11:13 AM CST -----  Contact: Pt [phone]  Pt would like a call back in regards to medication. PT states she would like medication prescribed for Nausea. Pt is requesting Phenergan. Please call and advise.    [facility]/pharmacy #[id number] - [place], [place] - [address] [phone] (Phone)  [phone] (Fax)

## 2019-01-14 NOTE — PROGRESS NOTES
CHIEF COMPLAINT:    Mrs. Hawley is a 63 y.o. female presenting for a follow up on recurrent UTI.      PRESENTING ILLNESS:    Tasha Hawley is a 63 y.o. female who presents today asking if she could have a cystectomy.  She has had several bladder infections which have caused her to have spasms and gross hematuria.  No fevers or chills.  She insisted at one point to have IV antibiotics before it was worked out that she does not have an allergy to penicillin.  She was subsequently tested and found not to be penicillin allergic.  Today, the catheter was changed and the urine was sent for culture.  In the clinic she is asymptomatic.  Urine is not malodorous and is clear yellow.     She states she has been nauseated but when she went to her primary, phenergan was not prescribed but her OAB med was.     REVIEW OF SYSTEMS:    Review of Systems   Constitutional: Negative.    HENT: Negative.    Eyes: Negative.    Respiratory: Negative.    Cardiovascular: Positive for leg swelling.   Gastrointestinal: Positive for constipation.   Genitourinary: Positive for hematuria.   Musculoskeletal: Positive for back pain.   Skin: Negative.    Neurological: Negative.    Endo/Heme/Allergies: Negative.    Psychiatric/Behavioral: The patient is nervous/anxious.        PATIENT HISTORY:    Past Medical History:   Diagnosis Date    Anticoagulant long-term use     Anxiety     Arthritis     Bilateral lower extremity edema     severe chronic    Carotid artery occlusion     Cataract     Depression     Fever blister     Hypothyroid     Iron deficiency anemia     Lumbar radiculopathy     with chronic pain    Ocular migraine     Sleep apnea     cpap       Past Surgical History:   Procedure Laterality Date    ADENOIDECTOMY      BACK SURGERY      x 12    BIOPSY-BLADDER N/A 3/20/2018    Performed by Shireen Mayo MD at [facility] OR [facility]    BIOPSY-BLADDER N/A 1/26/2017    Performed by Shireen Mayo MD at [facility] OR [facility]     CATARACT EXTRACTION W/  INTRAOCULAR LENS IMPLANT Left     Dr Coleman     CYSTOSCOPY N/A 3/20/2018    Performed by [facility]ireen Mayo MD at [facility] OR 1ST FLR    CYSTOSCOPY N/A 1/26/2017    Performed by Shireen Mayo MD at [facility] OR 1ST FLR    CYSTOSCOPY N/A 11/8/2016    Performed by Shireen Mayo MD at [facility] OR 2ND FLR    DISCECTOMY, SPINE, CERVICAL, ANTERIOR APPROACH, WITH FUSION N/A 5/13/2013    Performed by Larry Martinez MD at [facility] OR 2ND FLR    ESOPHAGOGASTRODUODENOSCOPY (EGD) N/A 5/23/2018    Performed by Prince Vance MD at [facility] ENDO (4TH FLR)    ESOPHAGOGASTRODUODENOSCOPY (EGD) N/A 7/21/2017    Performed by Prince Vance MD at [facility] ENDO (4TH FLR)    ESOPHAGOGASTRODUODENOSCOPY (EGD) w/ dilation N/A 8/28/2017    Performed by Prince Vance MD at [facility] ENDO (4TH FLR)    FULGURATION-BLADDER N/A 1/26/2017    Performed by Shireen Mayo MD at [facility] OR 1ST FLR    HEART CATH-LEFT Left 1/7/2016    Performed by Alberto Gee MD at [facility] CATH LAB    HYSTERECTOMY  1975    endometriosis    INJECTION-BOTOX N/A 3/20/2018    Performed by Shireen Mayo MD at [facility] OR 1ST FLR    INJECTION-BOTOX N/A 1/26/2017    Performed by Shireen Mayo MD at [facility] OR 1ST FLR    INJECTION-BULKING AGENT URETHRAL  OUTLET N/A 3/20/2018    Performed by Shireen Mayo MD at [facility] OR 1ST FLR    INSERTION-INTRAOCULAR LENS (IOL) Left 11/13/2014    Performed by Sanjana Coleman MD at [facility] OR 1ST FLR    INSERTION-SUPRAPUBIC TUBE-OPEN N/A 11/8/2016    Performed by Shireen Mayo MD at [facility] OR 2ND FLR    MANOMETRY-ESOPHAGEAL-WITH IMPEDANCE N/A 4/23/2018    Performed by Robert Menendez MD at [facility] ENDO (4TH FLR)    MYELOGRAM N/A 2/19/2013    Performed by [facility] Diagnostic Provider at [facility] OR 2ND FLR    pain pump placement      SQ Dilaudid Pump managed by Dr. Hillman, Pain Management    PHACOEMULSIFICATION-ASPIRATION-CATARACT Left 11/13/2014    Performed by Sanjana Coleman MD at [facility] OR 1ST FLR    ND UPPER GI  ENDOSCOPY,DIAGNOSIS [83704 (CPT®)] N/A 2014    Performed by [facility] at [facility] (4TH FLR)    SPINAL CORD STIMULATOR REMOVAL      before Larissa    SPINE SURGERY  13    CERVICAL FUSION    TONSILLECTOMY         Family History   Problem Relation Age of Onset    Cancer Mother 55        breast    Cancer Father         esophagus,had laryngectomy    Esophageal cancer Father     Parkinsonism Maternal Grandmother     Tremor Maternal Grandmother     Heart disease Maternal Uncle      Socioeconomic History    Marital status:    Tobacco Use    Smoking status: Never Smoker    Smokeless tobacco: Never Used   Substance and Sexual Activity    Alcohol use: Yes    Drug use: No    Sexual activity: No       Allergies:  (d)-limonene flavor; Bactrim [sulfamethoxazole-trimethoprim]; Benadryl [diphenhydramine hcl]; Imitrex [sumatriptan succinate]; Topamax [topiramate]; Vancomycin; Butorphanol tartrate; Darvocet a500 [propoxyphene n-acetaminophen]; Fentanyl; White petrolatum-zinc; Zinc oxide-white petrolatum; Latex, natural rubber; and Phenytoin    Medications:  Outpatient Encounter Medications as of 2019   Medication Sig Dispense Refill    aspirin (ECOTRIN) 81 MG EC tablet Take 1 tablet (81 mg total) by mouth once daily.  0    atorvastatin (LIPITOR) 80 MG tablet TAKE 1 TABLET BY MOUTH DAILY 90 tablet 3    buPROPion (WELLBUTRIN XL) 300 MG 24 hr tablet Take 1 tablet (300 mg total) by mouth once daily. 30 tablet 2    butalbital-aspirin-caffeine -40 mg (FIORINAL) -40 mg Cap Take 1 capsule by mouth 3 (three) times daily as needed.      [] cephALEXin (KEFLEX) 500 MG capsule Take 1 capsule (500 mg total) by mouth every 6 (six) hours. for 7 days 28 capsule 0    clopidogrel (PLAVIX) 75 mg tablet Take 75 mg by mouth once daily.      DOC-Q-LACE 100 mg capsule TAKE ONE CAPSULE BY MOUTH THREE TIMES DAILY AS NEEDED FOR CONSTIPATION 180 capsule 3    docosanol (ABREVA) 10 % Crea  Apply 1 application topically 2 (two) times daily. 2 g 0    FLUoxetine (PROZAC) 20 MG capsule Take 3 capsules (60 mg total) by mouth once daily. 90 capsule 2    furosemide (LASIX) 40 MG tablet Take 1 tablet (40 mg total) by mouth 2 (two) times daily. 60 tablet 11    hydrocodone-acetaminophen 10-325mg (NORCO)  mg Tab Take 2 tablets by mouth every 4 (four) hours as needed for Pain.       Lactobacillus acidophilus (PROBIOTIC) 10 billion cell Cap Take 1 capsule by mouth once daily.      levothyroxine (SYNTHROID) 125 MCG tablet Take 1 tablet (125 mcg total) by mouth before breakfast. 90 tablet 3    lidocaine (LIDODERM) 5 % APPLY 1 PATCH DAILY AND WEAR FOR A MAXIMUM OF 12 HOURS  5    [] nitrofurantoin (MACRODANTIN) 50 MG capsule Take 2 capsules (100 mg total) by mouth every 6 (six) hours. for 5 days 40 capsule 0    oxybutynin (DITROPAN XL) 15 MG TR24 Take 1 tablet (15 mg total) by mouth once daily. 30 tablet 11    pantoprazole (PROTONIX) 40 MG tablet Take 1 tablet (40 mg total) by mouth once daily. 90 tablet 3    potassium chloride SA (K-DUR,KLOR-CON) 20 MEQ tablet Take 20 mEq by mouth 2 (two) times daily.      potassium citrate (UROCIT-K) 10 mEq (1,080 mg) TbSR Take 1 tablet (10 mEq total) by mouth 3 (three) times daily with meals. 90 tablet 11    ranitidine (ZANTAC) 150 MG capsule Take 1 capsule (150 mg total) by mouth 2 (two) times daily. 180 capsule 3    SENNOSIDES (SENNA LAX ORAL) Take 20 mg by mouth every evening.       tiZANidine (ZANAFLEX) 4 MG tablet       traZODone (DESYREL) 100 MG tablet Take 2 tablets (200 mg total) by mouth every evening. 60 tablet 2         PHYSICAL EXAMINATION:    The patient generally appears in good health, is appropriately interactive, and is in no apparent distress.  She appears well.      Skin: No lesions.    Mental: Cooperative with normal affect.    Neuro: Grossly intact.    HEENT: Normal. No evidence of lymphadenopathy.    Chest:  normal inspiratory  effort.    Abdomen:  Soft, non-tender. No masses or organomegaly. Bladder is not palpable. No evidence of flank discomfort. No evidence of inguinal hernia.    Extremities: No clubbing, cyanosis, or edema    Normal external female genitalia, there is old dried blood externally, none on the speculum   Grade II urogenital atrophy  Urethral meatus is normal  Urethra and bladder are nontender to bimanual exam  Well supported anteriorly and posteriorly   Uterus and cervix are surgically absent  No adnexal masses  Urine in the collection bag is clear yellow.   LABS:    Lab Results   Component Value Date    BUN 12 12/31/2018    CREATININE 0.8 12/31/2018       IMPRESSION:    Encounter Diagnoses   Name Primary?    Recurrent UTI Yes    Neurogenic bladder     Suprapubic catheter     Urinary retention        PLAN:    1.  Renal ultrasound  2.  Await the results of the urine from today's catheter change.  Discussed that we do not treat for sediment in the urine.    3.  At the time of the suprapubic, her cardiac function was compensated enough to have a suprapubic tube, but she has chronic low blood pressure and a history of cardiac disease.  Doubt she would be able to tolerate a big surgery like cystectomy and ileal conduit.   4.  Phenergan was prescritbed  5.  Wonder how much her anxiety contributes to her symptomology.  She is well appearing today.

## 2019-01-15 NOTE — PROGRESS NOTES
I have personally taken the history and examined this patient and agree with the assessment and plan as per fellow's note.  I supervised penicillin skin testing and oral challenge

## 2019-01-17 DIAGNOSIS — R11.0 NAUSEA: ICD-10-CM

## 2019-01-17 RX ORDER — PROMETHAZINE HYDROCHLORIDE 25 MG/1
25 TABLET ORAL EVERY 6 HOURS PRN
Qty: 30 TABLET | Refills: 0 | Status: CANCELLED | OUTPATIENT
Start: 2019-01-17 | End: 2019-02-16

## 2019-01-17 RX ORDER — PROMETHAZINE HYDROCHLORIDE 25 MG/1
25 TABLET ORAL EVERY 6 HOURS PRN
Qty: 30 TABLET | Refills: 0 | OUTPATIENT
Start: 2019-01-17 | End: 2019-02-16

## 2019-01-17 RX ORDER — PROMETHAZINE HYDROCHLORIDE 25 MG/1
25 TABLET ORAL EVERY 6 HOURS PRN
Qty: 60 TABLET | Refills: 4 | Status: SHIPPED | OUTPATIENT
Start: 2019-01-17 | End: 2019-02-16

## 2019-01-18 ENCOUNTER — TELEPHONE (OUTPATIENT)
Dept: UROLOGY | Facility: CLINIC | Age: 64
End: 2019-01-18

## 2019-01-18 DIAGNOSIS — N30.90 BLADDER INFECTION: Primary | ICD-10-CM

## 2019-01-18 RX ORDER — LEVOFLOXACIN 500 MG/1
500 TABLET, FILM COATED ORAL DAILY
Qty: 7 TABLET | Refills: 0 | Status: SHIPPED | OUTPATIENT
Start: 2019-01-18 | End: 2019-01-28

## 2019-01-19 NOTE — TELEPHONE ENCOUNTER
Spoke to the patient and discussed that there were no stones, no hydronephrosis or masses seen on her ultrasound.

## 2019-01-19 NOTE — TELEPHONE ENCOUNTER
----- Message from Christi Wang LPN sent at 1/16/2019  4:33 PM CST -----  Contact: [phone]  Pt states she needs results of US  ----- Message -----  From: Karoline Land MA  Sent: 1/16/2019   2:43 PM  To: Maria Esther Iyer Staff     Test Results      Type of Test: Ultrasound    Date of Test: 1/14/19     Communication Preference: [phone]    Pt also states that she has pain and pressure in her bladder

## 2019-01-19 NOTE — TELEPHONE ENCOUNTER
----- Message from Christi Wang LPN sent at 1/18/2019  4:38 PM CST -----  Contact: [phone]  Pt calling for abx to be sent to [facility].  ----- Message -----  From: Karoline Land MA  Sent: 1/18/2019   8:49 AM  To: [facility] Staff    Needs Advice    Reason for call: wanting to know if she is needs to be on antibiotics  because she has another UTI and also asking if she should she been seen in infectious disease?        Communication Preference: [phone]    Additional Information: please call

## 2019-01-24 ENCOUNTER — TELEPHONE (OUTPATIENT)
Dept: INTERNAL MEDICINE | Facility: CLINIC | Age: 64
End: 2019-01-24

## 2019-01-24 NOTE — TELEPHONE ENCOUNTER
----- Message from Marie Dawson sent at 1/24/2019 12:25 PM CST -----  Contact: Shai  [phone]  Shai [facility] is calling to inform MD the readings of patients blood pressure 80/50 .  She would like to know if she can hold off on patients fluid pill for a couple of days.    Please advise, thanks

## 2019-01-25 ENCOUNTER — TELEPHONE (OUTPATIENT)
Dept: INTERNAL MEDICINE | Facility: CLINIC | Age: 64
End: 2019-01-25

## 2019-01-25 NOTE — TELEPHONE ENCOUNTER
Spoke with Liz with [facility], she has reported pt's b/p yesterday at her visit at 80/50, pt was asymptomatic.( pt is normally on the low side, systolic running at 90 to 100's/ diastolic in the 60's). This morning Liz spoke with pt and pt reported her b/p at 106/42. Pt is on lasix 40 mg one po bid. Should pt hold Lasix or decrease a dose?

## 2019-01-25 NOTE — TELEPHONE ENCOUNTER
Please call home health.  Hold lasix for one day and reduce dose to 20mg daily after that  Thank.s

## 2019-01-29 ENCOUNTER — TELEPHONE (OUTPATIENT)
Dept: INTERNAL MEDICINE | Facility: CLINIC | Age: 64
End: 2019-01-29

## 2019-01-29 NOTE — TELEPHONE ENCOUNTER
"Spoke with Liz, she asked pt to record b/p over the weekend, readings over 90/60 mostly. Liz got 100/68 today. Pt only took Lasix 20 mg on Saturday and Sunday and not Monday, stating that " she felt a little off and weak" . Pt got weighed today and is at 161.4, not swollen.  "

## 2019-01-29 NOTE — TELEPHONE ENCOUNTER
----- Message from Zoya Jarvis sent at 1/29/2019 11:00 AM CST -----  Contact: [facility] Nurse Liz [phone]  Caller is requesting a call back in regards to pt low BP reading. Please call and advise.

## 2019-01-29 NOTE — TELEPHONE ENCOUNTER
----- Message from Zoya Jarvis sent at 1/29/2019 11:00 AM CST -----  Contact: [facility] Nurse Liz [phone]  Caller is requesting a call back in regards to pt low BP reading. Please call and advise.

## 2019-01-29 NOTE — TELEPHONE ENCOUNTER
Please call the home health nurse.  Thank them for caring for Ms Hawley.  Pt should hold the lasix for a couple of days.  Let me know if things change    Thanks.  D

## 2019-01-31 ENCOUNTER — TELEPHONE (OUTPATIENT)
Dept: INTERNAL MEDICINE | Facility: CLINIC | Age: 64
End: 2019-01-31

## 2019-01-31 NOTE — TELEPHONE ENCOUNTER
----- Message from Tami Conteh sent at 1/31/2019 11:26 AM CST -----  Contact: Liz/ [facility]/[phone]  Nurse is requesting a call regarding patient's weight gain and blood pressure.    Please advise, thank you

## 2019-01-31 NOTE — TELEPHONE ENCOUNTER
Spoke with Liz with HH, pt has gained 6 lbs since Monday, bilateral 1+ edema below the knees, no SOB, lungs are clear. Pt hasn't been on Lasix since Monday. Pt is still 90's over 60's, heart rate in the 50's ( normal for a pt).

## 2019-02-04 ENCOUNTER — TELEPHONE (OUTPATIENT)
Dept: UROLOGY | Facility: CLINIC | Age: 64
End: 2019-02-04

## 2019-02-04 DIAGNOSIS — B37.49 CANDIDURIA: Primary | ICD-10-CM

## 2019-02-04 NOTE — TELEPHONE ENCOUNTER
Returned call to Saray ([facility] nurse) after reviewing results of UCx with NP. Culture results positive for >100,000 cfu/mL Yeast. Advised HH nurse, per NP, to replace catheter and new bag to treat.     Saray states specimen was collected from a new catheter and pt continues to complain of urinary symptoms, asking if pt can be treated with Diflucan.      0Asked that message be sent to Dr. Mayo, informed that Dr. Mayo is out today, may not have response until tomorrow (2/5/2019).

## 2019-02-04 NOTE — TELEPHONE ENCOUNTER
----- Message from Sis Calixto sent at 2/4/2019 11:19 AM CST -----  Contact: Saray ([facility]): [phone]  Needs Advice    Reason for call: Saray stated a urine culture was done on the pt which can back positive, would like to know does Dr. Mayo want to treat the pt, stated the results was faxed over this morning         Communication Preference: Saray ([facility]): [phone]

## 2019-02-07 RX ORDER — FLUCONAZOLE 200 MG/1
200 TABLET ORAL DAILY
Qty: 7 TABLET | Refills: 0 | Status: SHIPPED | OUTPATIENT
Start: 2019-02-07 | End: 2019-02-15

## 2019-02-08 DIAGNOSIS — G47.00 INSOMNIA, UNSPECIFIED TYPE: ICD-10-CM

## 2019-02-08 RX ORDER — TRAZODONE HYDROCHLORIDE 100 MG/1
200 TABLET ORAL NIGHTLY
Qty: 60 TABLET | Refills: 2 | Status: SHIPPED | OUTPATIENT
Start: 2019-02-08 | End: 2019-02-28

## 2019-02-12 ENCOUNTER — TELEPHONE (OUTPATIENT)
Dept: INTERNAL MEDICINE | Facility: CLINIC | Age: 64
End: 2019-02-12

## 2019-02-12 NOTE — TELEPHONE ENCOUNTER
----- Message from Tami Conteh sent at 2/12/2019  4:25 PM CST -----  Contact: Liz/[facility] [phone]  RN called to report that the patient BP has been low.  Today it was 92/60 nurse wanted to know if the wants to change her medication.    Please advise, thank you.

## 2019-02-13 ENCOUNTER — LAB VISIT (OUTPATIENT)
Dept: LAB | Facility: HOSPITAL | Age: 64
End: 2019-02-13
Attending: INTERNAL MEDICINE
Payer: MEDICARE

## 2019-02-13 ENCOUNTER — OFFICE VISIT (OUTPATIENT)
Dept: INFECTIOUS DISEASES | Facility: CLINIC | Age: 64
End: 2019-02-13
Payer: MEDICARE

## 2019-02-13 VITALS
HEIGHT: 64 IN | TEMPERATURE: 98 F | DIASTOLIC BLOOD PRESSURE: 41 MMHG | HEART RATE: 51 BPM | BODY MASS INDEX: 27.74 KG/M2 | SYSTOLIC BLOOD PRESSURE: 75 MMHG | WEIGHT: 162.5 LBS

## 2019-02-13 DIAGNOSIS — N39.0 RECURRENT UTI: Primary | ICD-10-CM

## 2019-02-13 DIAGNOSIS — I95.0 IDIOPATHIC HYPOTENSION: ICD-10-CM

## 2019-02-13 DIAGNOSIS — E03.8 OTHER SPECIFIED HYPOTHYROIDISM: ICD-10-CM

## 2019-02-13 LAB
ALBUMIN SERPL BCP-MCNC: 4 G/DL
ALP SERPL-CCNC: 122 U/L
ALT SERPL W/O P-5'-P-CCNC: 21 U/L
ANION GAP SERPL CALC-SCNC: 9 MMOL/L
AST SERPL-CCNC: 26 U/L
BASOPHILS # BLD AUTO: 0.01 K/UL
BASOPHILS NFR BLD: 0.2 %
BILIRUB SERPL-MCNC: 0.4 MG/DL
BUN SERPL-MCNC: 10 MG/DL
CALCIUM SERPL-MCNC: 10.1 MG/DL
CHLORIDE SERPL-SCNC: 97 MMOL/L
CO2 SERPL-SCNC: 32 MMOL/L
CREAT SERPL-MCNC: 1 MG/DL
DIFFERENTIAL METHOD: NORMAL
EOSINOPHIL # BLD AUTO: 0.1 K/UL
EOSINOPHIL NFR BLD: 2.5 %
ERYTHROCYTE [DISTWIDTH] IN BLOOD BY AUTOMATED COUNT: 13.2 %
EST. GFR  (AFRICAN AMERICAN): >60 ML/MIN/1.73 M^2
EST. GFR  (NON AFRICAN AMERICAN): >60 ML/MIN/1.73 M^2
GLUCOSE SERPL-MCNC: 92 MG/DL
HCT VFR BLD AUTO: 38.4 %
HGB BLD-MCNC: 12.3 G/DL
IMM GRANULOCYTES # BLD AUTO: 0.01 K/UL
IMM GRANULOCYTES NFR BLD AUTO: 0.2 %
LYMPHOCYTES # BLD AUTO: 1.7 K/UL
LYMPHOCYTES NFR BLD: 36.9 %
MCH RBC QN AUTO: 28 PG
MCHC RBC AUTO-ENTMCNC: 32 G/DL
MCV RBC AUTO: 87 FL
MONOCYTES # BLD AUTO: 0.3 K/UL
MONOCYTES NFR BLD: 6.6 %
NEUTROPHILS # BLD AUTO: 2.5 K/UL
NEUTROPHILS NFR BLD: 53.6 %
NRBC BLD-RTO: 0 /100 WBC
PLATELET # BLD AUTO: 204 K/UL
PMV BLD AUTO: 9.4 FL
POTASSIUM SERPL-SCNC: 4.2 MMOL/L
PROT SERPL-MCNC: 7.7 G/DL
RBC # BLD AUTO: 4.4 M/UL
SODIUM SERPL-SCNC: 138 MMOL/L
T4 FREE SERPL-MCNC: 1.04 NG/DL
TROPONIN I SERPL DL<=0.01 NG/ML-MCNC: <0.006 NG/ML
TSH SERPL DL<=0.005 MIU/L-ACNC: 11.04 UIU/ML
WBC # BLD AUTO: 4.72 K/UL

## 2019-02-13 PROCEDURE — 80053 COMPREHEN METABOLIC PANEL: CPT | Mod: HCNC

## 2019-02-13 PROCEDURE — 84439 ASSAY OF FREE THYROXINE: CPT | Mod: HCNC

## 2019-02-13 PROCEDURE — 84484 ASSAY OF TROPONIN QUANT: CPT | Mod: HCNC

## 2019-02-13 PROCEDURE — 3008F PR BODY MASS INDEX (BMI) DOCUMENTED: ICD-10-PCS | Mod: HCNC,CPTII,S$GLB, | Performed by: INTERNAL MEDICINE

## 2019-02-13 PROCEDURE — 99999 PR PBB SHADOW E&M-EST. PATIENT-LVL III: CPT | Mod: PBBFAC,HCNC,, | Performed by: INTERNAL MEDICINE

## 2019-02-13 PROCEDURE — 87040 BLOOD CULTURE FOR BACTERIA: CPT | Mod: HCNC

## 2019-02-13 PROCEDURE — 99999 PR PBB SHADOW E&M-EST. PATIENT-LVL III: ICD-10-PCS | Mod: PBBFAC,HCNC,, | Performed by: INTERNAL MEDICINE

## 2019-02-13 PROCEDURE — 3008F BODY MASS INDEX DOCD: CPT | Mod: HCNC,CPTII,S$GLB, | Performed by: INTERNAL MEDICINE

## 2019-02-13 PROCEDURE — 85025 COMPLETE CBC W/AUTO DIFF WBC: CPT | Mod: HCNC

## 2019-02-13 PROCEDURE — 99214 PR OFFICE/OUTPT VISIT, EST, LEVL IV, 30-39 MIN: ICD-10-PCS | Mod: HCNC,S$GLB,, | Performed by: INTERNAL MEDICINE

## 2019-02-13 PROCEDURE — 99214 OFFICE O/P EST MOD 30 MIN: CPT | Mod: HCNC,S$GLB,, | Performed by: INTERNAL MEDICINE

## 2019-02-13 PROCEDURE — 36415 COLL VENOUS BLD VENIPUNCTURE: CPT | Mod: HCNC

## 2019-02-13 PROCEDURE — 84443 ASSAY THYROID STIM HORMONE: CPT | Mod: HCNC

## 2019-02-13 NOTE — PROGRESS NOTES
Subjective:      Patient ID: Tasha Hawley is a 63 y.o. female.    Chief Complaint:NP cont. Bladder and Kidney infection      History of Present Illness    62 y/o female witn a history CAD, neurogenic bladder.  Her neurogenic bladder was initially managed with botox with minimal improvement.  She suprapubic catheter placed in November of 2016 to manage this.  Since then, she says that she has had a lot of kidney and bladder infections.  Her symptoms include increased pressure in her abdomen, burning sensations, blood in her urine and pain in her groin area.  Her last episode was 2 weeks ago.  She was treated with levofloxacin.    She is here today for evaluation of her recurrent bladder infections.  For the past few weeks, she has been having some light headedness.  She felt light headed this morning.  Her blood pressure is very low today.    Medical History  She has had multiple back surgeries with hardware placed.  Chronic back pain managed with a dilaudid pump.  She takes oral vicodin for break through pain.  Neurogenic bladder  Hypothyroidism  Coronary artery disease  Mitral valve prolapse        Review of Systems   Constitution: Positive for chills, decreased appetite, fever, weakness, malaise/fatigue and weight loss. Negative for night sweats and weight gain.   HENT: Positive for hearing loss and hoarse voice. Negative for congestion, ear pain, sore throat and tinnitus.    Eyes: Positive for blurred vision. Negative for redness and visual disturbance.   Cardiovascular: Negative for chest pain, leg swelling and palpitations.   Respiratory: Negative for cough, hemoptysis, shortness of breath and sputum production.    Hematologic/Lymphatic: Negative for adenopathy. Does not bruise/bleed easily.   Skin: Positive for dry skin and itching. Negative for rash and suspicious lesions.   Musculoskeletal: Positive for back pain, joint pain, myalgias and neck pain.   Gastrointestinal: Positive for abdominal pain,  constipation, heartburn and nausea. Negative for diarrhea and vomiting.   Genitourinary: Positive for dysuria, hematuria and urgency. Negative for flank pain, frequency and hesitancy.   Neurological: Positive for dizziness, headaches and numbness. Negative for paresthesias.   Psychiatric/Behavioral: Positive for depression. The patient has insomnia and is nervous/anxious.      Objective:   Physical Exam   Constitutional: She is oriented to person, place, and time. She appears well-developed and well-nourished. No distress.   HENT:   Head: Normocephalic and atraumatic.   Right Ear: External ear normal.   Left Ear: External ear normal.   Nose: Nose normal.   Mouth/Throat: Oropharynx is clear and moist. No oropharyngeal exudate.   Eyes: Conjunctivae and EOM are normal. Pupils are equal, round, and reactive to light. Right eye exhibits no discharge. Left eye exhibits no discharge. No scleral icterus.   Neck: Normal range of motion. Neck supple. No JVD present. No tracheal deviation present. No thyromegaly present.   Cardiovascular: Normal rate, regular rhythm and intact distal pulses. Exam reveals no gallop and no friction rub.   No murmur heard.  Pulmonary/Chest: Effort normal and breath sounds normal. No stridor. No respiratory distress. She has no wheezes. She has no rales. She exhibits no tenderness.   Abdominal: Soft. Bowel sounds are normal. She exhibits no distension and no mass. There is no tenderness. There is no rebound and no guarding.   Urostomy in place   Musculoskeletal: Normal range of motion. She exhibits no edema or tenderness.   Lymphadenopathy:     She has no cervical adenopathy.   Neurological: She is alert and oriented to person, place, and time. She displays normal reflexes. No cranial nerve deficit. She exhibits normal muscle tone.   Skin: Skin is warm. No rash noted. She is not diaphoretic. No erythema. No pallor.   Psychiatric: She has a normal mood and affect. Her behavior is normal. Judgment  and thought content normal.   Nursing note and vitals reviewed.    Assessment:       1. Recurrent UTI :  Will obtain urine cultures today as she says she is having her typical symptoms.  Long term, may initiate prophylaxis in the future if she continues to have recurrent UTIs.   2. Idiopathic hypotension :  BP is low today.  Will check blood cultures to rule out sepsis as a cause.  Will check thyroid levels as she has a history of hypothyroidism.   3. Other specified hypothyroidism :  Plan as outlined above.         Plan:       Recurrent UTI  -     Cancel: Urinalysis; Future; Expected date: 02/13/2019  -     Cancel: Urine culture; Future; Expected date: 02/13/2019  -     Urinalysis; Future; Expected date: 02/13/2019  -     Urine culture; Future; Expected date: 02/13/2019    Idiopathic hypotension  -     CBC auto differential; Future; Expected date: 02/13/2019  -     Comprehensive metabolic panel; Future; Expected date: 02/13/2019  -     Blood culture; Future; Expected date: 02/13/2019  -     TSH; Future; Expected date: 02/13/2019  -     T4, free; Future; Expected date: 02/13/2019  -     Troponin I; Future; Expected date: 02/13/2019    Other specified hypothyroidism  -     TSH; Future; Expected date: 02/13/2019  -     T4, free; Future; Expected date: 02/13/2019

## 2019-02-14 ENCOUNTER — TELEPHONE (OUTPATIENT)
Dept: GASTROENTEROLOGY | Facility: CLINIC | Age: 64
End: 2019-02-14

## 2019-02-14 ENCOUNTER — LAB VISIT (OUTPATIENT)
Dept: LAB | Facility: HOSPITAL | Age: 64
End: 2019-02-14
Attending: INTERNAL MEDICINE
Payer: MEDICARE

## 2019-02-14 DIAGNOSIS — N39.0 RECURRENT UTI: ICD-10-CM

## 2019-02-14 LAB
BILIRUB UR QL STRIP: NEGATIVE
CLARITY UR REFRACT.AUTO: ABNORMAL
COLOR UR AUTO: YELLOW
GLUCOSE UR QL STRIP: NEGATIVE
HGB UR QL STRIP: ABNORMAL
KETONES UR QL STRIP: NEGATIVE
LEUKOCYTE ESTERASE UR QL STRIP: ABNORMAL
MICROSCOPIC COMMENT: ABNORMAL
NITRITE UR QL STRIP: NEGATIVE
PH UR STRIP: 6 [PH] (ref 5–8)
PROT UR QL STRIP: NEGATIVE
RBC #/AREA URNS AUTO: 93 /HPF (ref 0–4)
SP GR UR STRIP: 1.01 (ref 1–1.03)
SQUAMOUS #/AREA URNS AUTO: 0 /HPF
URN SPEC COLLECT METH UR: ABNORMAL
WBC #/AREA URNS AUTO: 15 /HPF (ref 0–5)
WBC CLUMPS UR QL AUTO: ABNORMAL
YEAST UR QL AUTO: ABNORMAL

## 2019-02-14 PROCEDURE — 81001 URINALYSIS AUTO W/SCOPE: CPT | Mod: HCNC

## 2019-02-14 PROCEDURE — 87077 CULTURE AEROBIC IDENTIFY: CPT | Mod: HCNC

## 2019-02-14 PROCEDURE — 87186 SC STD MICRODIL/AGAR DIL: CPT | Mod: HCNC

## 2019-02-14 PROCEDURE — 87088 URINE BACTERIA CULTURE: CPT | Mod: HCNC

## 2019-02-14 PROCEDURE — 87086 URINE CULTURE/COLONY COUNT: CPT | Mod: HCNC

## 2019-02-14 NOTE — TELEPHONE ENCOUNTER
----- Message from Prince Vance MD sent at 2/14/2019  9:38 AM CST -----  Contact: Self- [phone]  Can she see me Tuesday at noon  ----- Message -----  From: Padmini Simon MA  Sent: 2/14/2019   9:03 AM  To: Prince Vance MD    Last EGD was 5/23/18.  ----- Message -----  From: Loren Lopez  Sent: 2/14/2019   8:52 AM  To: Pinky Vance- pt called to schedule f/u appt- states she is having trouble swallowing and food getting stuck in her throat- please contact pt at [phone]

## 2019-02-18 ENCOUNTER — TELEPHONE (OUTPATIENT)
Dept: INTERNAL MEDICINE | Facility: CLINIC | Age: 64
End: 2019-02-18

## 2019-02-18 LAB — BACTERIA BLD CULT: NORMAL

## 2019-02-18 NOTE — TELEPHONE ENCOUNTER
----- Message from Amanda Grande sent at 2/18/2019  1:57 PM CST -----  Contact: Saray Franz [facility] [phone]  Having low blood pressure since last week it has been borderline, today's reading is  106/70, pt is not taking her lasix as prescribed. Saray would like to speak with someone today.    Thank you

## 2019-02-19 RX ORDER — QUETIAPINE FUMARATE 100 MG/1
100 TABLET, FILM COATED ORAL NIGHTLY
Qty: 30 TABLET | Refills: 3 | Status: SHIPPED | OUTPATIENT
Start: 2019-02-19 | End: 2019-02-28 | Stop reason: SDUPTHER

## 2019-02-20 DIAGNOSIS — B37.49 CANDIDURIA: ICD-10-CM

## 2019-02-20 RX ORDER — QUETIAPINE FUMARATE 100 MG/1
100 TABLET, FILM COATED ORAL NIGHTLY
Qty: 30 TABLET | Refills: 3 | OUTPATIENT
Start: 2019-02-20 | End: 2020-02-20

## 2019-02-20 RX ORDER — FLUCONAZOLE 200 MG/1
200 TABLET ORAL DAILY
Qty: 7 TABLET | Refills: 0 | OUTPATIENT
Start: 2019-02-20 | End: 2019-02-27

## 2019-02-21 ENCOUNTER — TELEPHONE (OUTPATIENT)
Dept: INFECTIOUS DISEASES | Facility: CLINIC | Age: 64
End: 2019-02-21

## 2019-02-21 ENCOUNTER — TELEPHONE (OUTPATIENT)
Dept: INTERNAL MEDICINE | Facility: CLINIC | Age: 64
End: 2019-02-21

## 2019-02-21 ENCOUNTER — HOSPITAL ENCOUNTER (OUTPATIENT)
Facility: HOSPITAL | Age: 64
Discharge: HOME | End: 2019-02-22
Attending: EMERGENCY MEDICINE | Admitting: HOSPITALIST
Payer: MEDICARE

## 2019-02-21 DIAGNOSIS — I25.10 CORONARY ARTERY DISEASE INVOLVING NATIVE CORONARY ARTERY OF NATIVE HEART WITHOUT ANGINA PECTORIS: Chronic | ICD-10-CM

## 2019-02-21 DIAGNOSIS — N30.00 ACUTE CYSTITIS WITHOUT HEMATURIA: Primary | ICD-10-CM

## 2019-02-21 DIAGNOSIS — R07.9 CHEST PAIN, UNSPECIFIED TYPE: ICD-10-CM

## 2019-02-21 DIAGNOSIS — R07.9 CHEST PAIN: ICD-10-CM

## 2019-02-21 DIAGNOSIS — R06.00 DYSPNEA, UNSPECIFIED TYPE: ICD-10-CM

## 2019-02-21 DIAGNOSIS — N30.00 ACUTE CYSTITIS WITHOUT HEMATURIA: ICD-10-CM

## 2019-02-21 DIAGNOSIS — M94.0 COSTOCHONDRITIS: Primary | ICD-10-CM

## 2019-02-21 LAB
ALBUMIN SERPL BCP-MCNC: 3.1 G/DL
ALP SERPL-CCNC: 101 U/L
ALT SERPL W/O P-5'-P-CCNC: 17 U/L
ANION GAP SERPL CALC-SCNC: 8 MMOL/L
AST SERPL-CCNC: 17 U/L
BACTERIA UR CULT: NORMAL
BACTERIA UR CULT: NORMAL
BASOPHILS # BLD AUTO: 0.01 K/UL
BASOPHILS NFR BLD: 0.1 %
BILIRUB SERPL-MCNC: 0.7 MG/DL
BNP SERPL-MCNC: 124 PG/ML
BUN SERPL-MCNC: 10 MG/DL
CALCIUM SERPL-MCNC: 9.4 MG/DL
CHLORIDE SERPL-SCNC: 102 MMOL/L
CO2 SERPL-SCNC: 26 MMOL/L
CREAT SERPL-MCNC: 0.8 MG/DL
DIFFERENTIAL METHOD: ABNORMAL
EOSINOPHIL # BLD AUTO: 0.1 K/UL
EOSINOPHIL NFR BLD: 0.8 %
ERYTHROCYTE [DISTWIDTH] IN BLOOD BY AUTOMATED COUNT: 13.5 %
EST. GFR  (AFRICAN AMERICAN): >60 ML/MIN/1.73 M^2
EST. GFR  (NON AFRICAN AMERICAN): >60 ML/MIN/1.73 M^2
GLUCOSE SERPL-MCNC: 103 MG/DL
HCT VFR BLD AUTO: 37.1 %
HGB BLD-MCNC: 11.9 G/DL
LYMPHOCYTES # BLD AUTO: 1.1 K/UL
LYMPHOCYTES NFR BLD: 11.6 %
MCH RBC QN AUTO: 27.9 PG
MCHC RBC AUTO-ENTMCNC: 32.1 G/DL
MCV RBC AUTO: 87 FL
MONOCYTES # BLD AUTO: 1 K/UL
MONOCYTES NFR BLD: 11 %
NEUTROPHILS # BLD AUTO: 7 K/UL
NEUTROPHILS NFR BLD: 76.3 %
PLATELET # BLD AUTO: 219 K/UL
PMV BLD AUTO: 9.4 FL
POTASSIUM SERPL-SCNC: 4.3 MMOL/L
PROT SERPL-MCNC: 6.9 G/DL
RBC # BLD AUTO: 4.26 M/UL
SODIUM SERPL-SCNC: 136 MMOL/L
TROPONIN I SERPL DL<=0.01 NG/ML-MCNC: 0.01 NG/ML
WBC # BLD AUTO: 9.11 K/UL

## 2019-02-21 PROCEDURE — 63600175 PHARM REV CODE 636 W HCPCS: Mod: HCNC | Performed by: EMERGENCY MEDICINE

## 2019-02-21 PROCEDURE — 96374 THER/PROPH/DIAG INJ IV PUSH: CPT | Mod: HCNC

## 2019-02-21 PROCEDURE — 85025 COMPLETE CBC W/AUTO DIFF WBC: CPT | Mod: HCNC

## 2019-02-21 PROCEDURE — 84484 ASSAY OF TROPONIN QUANT: CPT | Mod: HCNC

## 2019-02-21 PROCEDURE — 80053 COMPREHEN METABOLIC PANEL: CPT | Mod: HCNC

## 2019-02-21 PROCEDURE — 99285 EMERGENCY DEPT VISIT HI MDM: CPT | Mod: 25,HCNC

## 2019-02-21 PROCEDURE — G0378 HOSPITAL OBSERVATION PER HR: HCPCS | Mod: HCNC

## 2019-02-21 PROCEDURE — 84443 ASSAY THYROID STIM HORMONE: CPT | Mod: HCNC

## 2019-02-21 PROCEDURE — 83880 ASSAY OF NATRIURETIC PEPTIDE: CPT | Mod: HCNC

## 2019-02-21 PROCEDURE — 93005 ELECTROCARDIOGRAM TRACING: CPT | Mod: HCNC

## 2019-02-21 PROCEDURE — 80061 LIPID PANEL: CPT | Mod: HCNC

## 2019-02-21 RX ORDER — AMOXICILLIN 500 MG/1
500 CAPSULE ORAL 3 TIMES DAILY
Qty: 21 CAPSULE | Refills: 0 | Status: ON HOLD | OUTPATIENT
Start: 2019-02-21 | End: 2019-02-22 | Stop reason: SDUPTHER

## 2019-02-21 RX ORDER — SODIUM CHLORIDE 0.9 % (FLUSH) 0.9 %
5 SYRINGE (ML) INJECTION
Status: DISCONTINUED | OUTPATIENT
Start: 2019-02-21 | End: 2019-02-22 | Stop reason: HOSPADM

## 2019-02-21 RX ORDER — LEVOFLOXACIN 500 MG/1
500 TABLET, FILM COATED ORAL DAILY
Qty: 7 TABLET | Refills: 0 | Status: ON HOLD | OUTPATIENT
Start: 2019-02-21 | End: 2019-02-22 | Stop reason: SDUPTHER

## 2019-02-21 RX ORDER — ONDANSETRON 2 MG/ML
4 INJECTION INTRAMUSCULAR; INTRAVENOUS EVERY 8 HOURS PRN
Status: DISCONTINUED | OUTPATIENT
Start: 2019-02-21 | End: 2019-02-22 | Stop reason: HOSPADM

## 2019-02-21 RX ORDER — FUROSEMIDE 10 MG/ML
40 INJECTION INTRAMUSCULAR; INTRAVENOUS
Status: COMPLETED | OUTPATIENT
Start: 2019-02-21 | End: 2019-02-21

## 2019-02-21 RX ORDER — ACETAMINOPHEN 325 MG/1
650 TABLET ORAL EVERY 4 HOURS PRN
Status: DISCONTINUED | OUTPATIENT
Start: 2019-02-21 | End: 2019-02-22 | Stop reason: HOSPADM

## 2019-02-21 RX ORDER — FUROSEMIDE 40 MG/1
40 TABLET ORAL 2 TIMES DAILY
Qty: 60 TABLET | Refills: 11 | Status: SHIPPED | OUTPATIENT
Start: 2019-02-21 | End: 2019-02-28 | Stop reason: SDUPTHER

## 2019-02-21 RX ADMIN — FUROSEMIDE 40 MG: 10 INJECTION, SOLUTION INTRAVENOUS at 10:02

## 2019-02-21 NOTE — TELEPHONE ENCOUNTER
----- Message from Karla Barboza sent at 2/21/2019 11:25 AM CST -----  Contact: [facility]/[phone]  Type:Home Health(orders,updates,clarifications,etc)    Home Health Agency/Nurse: [facility]    Phone number:[phone]    Reason for call:    Comments: recommend to the patient daughter to take patient to the ER because blood pressure still low, vomiting yesterday, and chest pain. Please call and advise. Thank you!!!

## 2019-02-21 NOTE — TELEPHONE ENCOUNTER
Spoke to the [facility] nurse she states she saw pt today pt complained on heaviness in chest for the past several days   She has not been eating and has lost 4lbs with in the last 2 days  The pain in her chest is at an 8 to a 9   Her HR resting for her is 50 to 60 which is her norm   But today she took it and it her resting was 90 her  BP today 90/60   She advised pt and  that she needed to go to ED   Pt was resistant but  states that he will bring he to ED    This is just an FYI    Em

## 2019-02-21 NOTE — TELEPHONE ENCOUNTER
Results reviewed with patient.  She is not feeling well.   Says she feels like something is on her chest.      Assessment  1.  Possible UTI    Plan  1.  Amoxicillin 500 mg po tid for 7 days.  2.  Levofloxacin 500 mg qday for 7 days.

## 2019-02-22 VITALS
DIASTOLIC BLOOD PRESSURE: 60 MMHG | TEMPERATURE: 98 F | HEART RATE: 60 BPM | SYSTOLIC BLOOD PRESSURE: 90 MMHG | HEIGHT: 64 IN | BODY MASS INDEX: 26.29 KG/M2 | WEIGHT: 154 LBS | OXYGEN SATURATION: 96 % | RESPIRATION RATE: 18 BRPM

## 2019-02-22 PROBLEM — I95.9 HYPOTENSION: Status: ACTIVE | Noted: 2019-02-22

## 2019-02-22 PROBLEM — R07.9 CHEST PAIN: Status: RESOLVED | Noted: 2019-02-21 | Resolved: 2019-02-22

## 2019-02-22 PROBLEM — M94.0 COSTOCHONDRITIS: Status: ACTIVE | Noted: 2019-02-22

## 2019-02-22 PROBLEM — R06.00 DYSPNEA: Status: ACTIVE | Noted: 2019-02-22

## 2019-02-22 LAB
AORTIC ROOT ANNULUS: 3 CM
AV INDEX (PROSTH): 0.76
AV MEAN GRADIENT: 4.62 MMHG
AV PEAK GRADIENT: 7.4 MMHG
AV VALVE AREA: 2.29 CM2
AV VELOCITY RATIO: 0.75
BACTERIA #/AREA URNS HPF: ABNORMAL /HPF
BILIRUB UR QL STRIP: NEGATIVE
BSA FOR ECHO PROCEDURE: 1.78 M2
CHOLEST SERPL-MCNC: 122 MG/DL
CHOLEST/HDLC SERPL: 2.6 {RATIO}
CLARITY UR: CLEAR
COLOR UR: ABNORMAL
CV ECHO LV RWT: 0.45 CM
D DIMER PPP IA.FEU-MCNC: 0.82 MG/L FEU
DOP CALC AO PEAK VEL: 1.36 M/S
DOP CALC AO VTI: 34.48 CM
DOP CALC LVOT AREA: 3.02 CM2
DOP CALC LVOT DIAMETER: 1.96 CM
DOP CALC LVOT PEAK VEL: 1.02 M/S
DOP CALC LVOT STROKE VOLUME: 79.1 CM3
DOP CALCLVOT PEAK VEL VTI: 26.23 CM
E WAVE DECELERATION TIME: 222.07 MSEC
E/A RATIO: 1.21
ECHO LV POSTERIOR WALL: 1.07 CM (ref 0.6–1.1)
FRACTIONAL SHORTENING: 32 % (ref 28–44)
GLUCOSE UR QL STRIP: NEGATIVE
HDLC SERPL-MCNC: 47 MG/DL
HDLC SERPL: 38.5 %
HGB UR QL STRIP: ABNORMAL
INTERVENTRICULAR SEPTUM: 0.9 CM (ref 0.6–1.1)
KETONES UR QL STRIP: NEGATIVE
LA MAJOR: 4.86 CM
LA MINOR: 4.15 CM
LA WIDTH: 4.08 CM
LDLC SERPL CALC-MCNC: 63.8 MG/DL
LEFT ATRIUM SIZE: 4.29 CM
LEFT ATRIUM VOLUME INDEX: 38.1 ML/M2
LEFT ATRIUM VOLUME: 66.61 CM3
LEFT INTERNAL DIMENSION IN SYSTOLE: 3.19 CM (ref 2.1–4)
LEFT VENTRICLE DIASTOLIC VOLUME INDEX: 58.8 ML/M2
LEFT VENTRICLE DIASTOLIC VOLUME: 102.92 ML
LEFT VENTRICLE MASS INDEX: 92.4 G/M2
LEFT VENTRICLE SYSTOLIC VOLUME INDEX: 23.2 ML/M2
LEFT VENTRICLE SYSTOLIC VOLUME: 40.54 ML
LEFT VENTRICULAR INTERNAL DIMENSION IN DIASTOLE: 4.71 CM (ref 3.5–6)
LEFT VENTRICULAR MASS: 161.67 G
LEUKOCYTE ESTERASE UR QL STRIP: ABNORMAL
LV LATERAL E/E' RATIO: 8.8
MICROSCOPIC COMMENT: ABNORMAL
MV PEAK A VEL: 0.73 M/S
MV PEAK E VEL: 0.88 M/S
NITRITE UR QL STRIP: POSITIVE
NONHDLC SERPL-MCNC: 75 MG/DL
PH UR STRIP: 6 [PH] (ref 5–8)
PISA TR MAX VEL: 2.4 M/S
PROT UR QL STRIP: NEGATIVE
RA MAJOR: 4.07 CM
RBC #/AREA URNS HPF: 1 /HPF (ref 0–4)
RIGHT VENTRICULAR END-DIASTOLIC DIMENSION: 2.94 CM
SP GR UR STRIP: <=1.005 (ref 1–1.03)
SQUAMOUS #/AREA URNS HPF: 0 /HPF
TDI LATERAL: 0.1
TR MAX PG: 23.04 MMHG
TRIGL SERPL-MCNC: 56 MG/DL
TROPONIN I SERPL DL<=0.01 NG/ML-MCNC: <0.006 NG/ML
TROPONIN I SERPL DL<=0.01 NG/ML-MCNC: <0.006 NG/ML
TSH SERPL DL<=0.005 MIU/L-ACNC: 0.74 UIU/ML
URN SPEC COLLECT METH UR: ABNORMAL
UROBILINOGEN UR STRIP-ACNC: NEGATIVE EU/DL
WBC #/AREA URNS HPF: 12 /HPF (ref 0–5)

## 2019-02-22 PROCEDURE — 25500020 PHARM REV CODE 255: Mod: HCNC | Performed by: HOSPITALIST

## 2019-02-22 PROCEDURE — 93005 ELECTROCARDIOGRAM TRACING: CPT | Mod: HCNC

## 2019-02-22 PROCEDURE — 25000003 PHARM REV CODE 250: Mod: HCNC | Performed by: NURSE PRACTITIONER

## 2019-02-22 PROCEDURE — 96375 TX/PRO/DX INJ NEW DRUG ADDON: CPT | Mod: 59

## 2019-02-22 PROCEDURE — 99214 OFFICE O/P EST MOD 30 MIN: CPT | Mod: HCNC,,, | Performed by: NURSE PRACTITIONER

## 2019-02-22 PROCEDURE — 87088 URINE BACTERIA CULTURE: CPT | Mod: HCNC

## 2019-02-22 PROCEDURE — 84484 ASSAY OF TROPONIN QUANT: CPT | Mod: 91,HCNC

## 2019-02-22 PROCEDURE — 87086 URINE CULTURE/COLONY COUNT: CPT | Mod: HCNC

## 2019-02-22 PROCEDURE — G0378 HOSPITAL OBSERVATION PER HR: HCPCS | Mod: HCNC

## 2019-02-22 PROCEDURE — 87186 SC STD MICRODIL/AGAR DIL: CPT | Mod: HCNC

## 2019-02-22 PROCEDURE — 81000 URINALYSIS NONAUTO W/SCOPE: CPT | Mod: HCNC

## 2019-02-22 PROCEDURE — 87077 CULTURE AEROBIC IDENTIFY: CPT | Mod: HCNC

## 2019-02-22 PROCEDURE — 99214 PR OFFICE/OUTPT VISIT, EST, LEVL IV, 30-39 MIN: ICD-10-PCS | Mod: HCNC,,, | Performed by: NURSE PRACTITIONER

## 2019-02-22 PROCEDURE — 96361 HYDRATE IV INFUSION ADD-ON: CPT

## 2019-02-22 PROCEDURE — 85379 FIBRIN DEGRADATION QUANT: CPT | Mod: HCNC

## 2019-02-22 PROCEDURE — 94761 N-INVAS EAR/PLS OXIMETRY MLT: CPT | Mod: HCNC

## 2019-02-22 PROCEDURE — 63600175 PHARM REV CODE 636 W HCPCS: Mod: HCNC | Performed by: NURSE PRACTITIONER

## 2019-02-22 PROCEDURE — 36415 COLL VENOUS BLD VENIPUNCTURE: CPT | Mod: HCNC

## 2019-02-22 RX ORDER — ENOXAPARIN SODIUM 100 MG/ML
40 INJECTION SUBCUTANEOUS EVERY 24 HOURS
Status: DISCONTINUED | OUTPATIENT
Start: 2019-02-22 | End: 2019-02-22

## 2019-02-22 RX ORDER — PANTOPRAZOLE SODIUM 40 MG/1
40 TABLET, DELAYED RELEASE ORAL DAILY
Status: DISCONTINUED | OUTPATIENT
Start: 2019-02-22 | End: 2019-02-22 | Stop reason: HOSPADM

## 2019-02-22 RX ORDER — KETOROLAC TROMETHAMINE 30 MG/ML
15 INJECTION, SOLUTION INTRAMUSCULAR; INTRAVENOUS ONCE
Status: COMPLETED | OUTPATIENT
Start: 2019-02-22 | End: 2019-02-22

## 2019-02-22 RX ORDER — CLOPIDOGREL BISULFATE 75 MG/1
75 TABLET ORAL DAILY
Status: DISCONTINUED | OUTPATIENT
Start: 2019-02-22 | End: 2019-02-22 | Stop reason: HOSPADM

## 2019-02-22 RX ORDER — HYDROCODONE BITARTRATE AND ACETAMINOPHEN 5; 325 MG/1; MG/1
1 TABLET ORAL EVERY 6 HOURS PRN
Status: DISCONTINUED | OUTPATIENT
Start: 2019-02-22 | End: 2019-02-22

## 2019-02-22 RX ORDER — QUETIAPINE FUMARATE 25 MG/1
100 TABLET, FILM COATED ORAL NIGHTLY
Status: DISCONTINUED | OUTPATIENT
Start: 2019-02-22 | End: 2019-02-22

## 2019-02-22 RX ORDER — TRAZODONE HYDROCHLORIDE 50 MG/1
50 TABLET ORAL ONCE
Status: COMPLETED | OUTPATIENT
Start: 2019-02-22 | End: 2019-02-22

## 2019-02-22 RX ORDER — LEVOTHYROXINE SODIUM 125 UG/1
125 TABLET ORAL
Status: DISCONTINUED | OUTPATIENT
Start: 2019-02-22 | End: 2019-02-22 | Stop reason: HOSPADM

## 2019-02-22 RX ORDER — BUPROPION HYDROCHLORIDE 150 MG/1
300 TABLET ORAL DAILY
Status: DISCONTINUED | OUTPATIENT
Start: 2019-02-22 | End: 2019-02-22 | Stop reason: HOSPADM

## 2019-02-22 RX ORDER — OXYBUTYNIN CHLORIDE 5 MG/1
15 TABLET, EXTENDED RELEASE ORAL DAILY
Status: DISCONTINUED | OUTPATIENT
Start: 2019-02-22 | End: 2019-02-22 | Stop reason: HOSPADM

## 2019-02-22 RX ORDER — HYDROCODONE BITARTRATE AND ACETAMINOPHEN 5; 325 MG/1; MG/1
1 TABLET ORAL EVERY 8 HOURS PRN
Status: DISCONTINUED | OUTPATIENT
Start: 2019-02-22 | End: 2019-02-22

## 2019-02-22 RX ORDER — KETOROLAC TROMETHAMINE 30 MG/ML
15 INJECTION, SOLUTION INTRAMUSCULAR; INTRAVENOUS ONCE
Status: DISCONTINUED | OUTPATIENT
Start: 2019-02-22 | End: 2019-02-22

## 2019-02-22 RX ORDER — AMOXICILLIN 250 MG/1
500 CAPSULE ORAL EVERY 8 HOURS
Status: DISCONTINUED | OUTPATIENT
Start: 2019-02-22 | End: 2019-02-22 | Stop reason: HOSPADM

## 2019-02-22 RX ORDER — LIDOCAINE 50 MG/G
1 PATCH TOPICAL DAILY
Status: DISCONTINUED | OUTPATIENT
Start: 2019-02-22 | End: 2019-02-22 | Stop reason: HOSPADM

## 2019-02-22 RX ORDER — SODIUM CHLORIDE 9 MG/ML
INJECTION, SOLUTION INTRAVENOUS CONTINUOUS
Status: DISCONTINUED | OUTPATIENT
Start: 2019-02-22 | End: 2019-02-22 | Stop reason: HOSPADM

## 2019-02-22 RX ORDER — LEVOFLOXACIN 500 MG/1
500 TABLET, FILM COATED ORAL DAILY
Status: DISCONTINUED | OUTPATIENT
Start: 2019-02-22 | End: 2019-02-22 | Stop reason: HOSPADM

## 2019-02-22 RX ORDER — ATORVASTATIN CALCIUM 20 MG/1
80 TABLET, FILM COATED ORAL DAILY
Status: DISCONTINUED | OUTPATIENT
Start: 2019-02-22 | End: 2019-02-22 | Stop reason: HOSPADM

## 2019-02-22 RX ORDER — AMOXICILLIN 500 MG/1
500 CAPSULE ORAL 3 TIMES DAILY
Qty: 21 CAPSULE | Refills: 0 | Status: SHIPPED | OUTPATIENT
Start: 2019-02-22 | End: 2019-03-01

## 2019-02-22 RX ORDER — ASPIRIN 81 MG/1
81 TABLET ORAL DAILY
Status: DISCONTINUED | OUTPATIENT
Start: 2019-02-22 | End: 2019-02-22 | Stop reason: HOSPADM

## 2019-02-22 RX ORDER — LEVOFLOXACIN 500 MG/1
500 TABLET, FILM COATED ORAL DAILY
Qty: 7 TABLET | Refills: 0 | Status: SHIPPED | OUTPATIENT
Start: 2019-02-22 | End: 2019-03-01

## 2019-02-22 RX ORDER — FLUOXETINE HYDROCHLORIDE 20 MG/1
60 CAPSULE ORAL DAILY
Status: DISCONTINUED | OUTPATIENT
Start: 2019-02-22 | End: 2019-02-22 | Stop reason: HOSPADM

## 2019-02-22 RX ORDER — DOCUSATE SODIUM 100 MG/1
100 CAPSULE, LIQUID FILLED ORAL 2 TIMES DAILY
Status: DISCONTINUED | OUTPATIENT
Start: 2019-02-22 | End: 2019-02-22 | Stop reason: HOSPADM

## 2019-02-22 RX ADMIN — SODIUM CHLORIDE: 0.9 INJECTION, SOLUTION INTRAVENOUS at 06:02

## 2019-02-22 RX ADMIN — SODIUM CHLORIDE 500 ML: 0.9 INJECTION, SOLUTION INTRAVENOUS at 02:02

## 2019-02-22 RX ADMIN — OXYBUTYNIN CHLORIDE 15 MG: 5 TABLET, EXTENDED RELEASE ORAL at 10:02

## 2019-02-22 RX ADMIN — DOCUSATE SODIUM 100 MG: 100 CAPSULE, LIQUID FILLED ORAL at 10:02

## 2019-02-22 RX ADMIN — QUETIAPINE 100 MG: 25 TABLET ORAL at 01:02

## 2019-02-22 RX ADMIN — LIDOCAINE 1 PATCH: 50 PATCH TOPICAL at 10:02

## 2019-02-22 RX ADMIN — HYDROCODONE BITARTRATE AND ACETAMINOPHEN 1 TABLET: 5; 325 TABLET ORAL at 12:02

## 2019-02-22 RX ADMIN — PANTOPRAZOLE SODIUM 40 MG: 40 TABLET, DELAYED RELEASE ORAL at 10:02

## 2019-02-22 RX ADMIN — FLUOXETINE 60 MG: 20 CAPSULE ORAL at 10:02

## 2019-02-22 RX ADMIN — CLOPIDOGREL 75 MG: 75 TABLET, FILM COATED ORAL at 10:02

## 2019-02-22 RX ADMIN — LEVOTHYROXINE SODIUM 125 MCG: 125 TABLET ORAL at 05:02

## 2019-02-22 RX ADMIN — ATORVASTATIN CALCIUM 80 MG: 20 TABLET, FILM COATED ORAL at 10:02

## 2019-02-22 RX ADMIN — BUPROPION HYDROCHLORIDE 300 MG: 150 TABLET, FILM COATED, EXTENDED RELEASE ORAL at 10:02

## 2019-02-22 RX ADMIN — AMOXICILLIN 500 MG: 250 CAPSULE ORAL at 05:02

## 2019-02-22 RX ADMIN — ASPIRIN 81 MG: 81 TABLET, COATED ORAL at 10:02

## 2019-02-22 RX ADMIN — ACETAMINOPHEN 650 MG: 325 TABLET ORAL at 12:02

## 2019-02-22 RX ADMIN — IOHEXOL 100 ML: 350 INJECTION, SOLUTION INTRAVENOUS at 09:02

## 2019-02-22 RX ADMIN — AMOXICILLIN 500 MG: 250 CAPSULE ORAL at 01:02

## 2019-02-22 RX ADMIN — AMOXICILLIN 500 MG: 250 CAPSULE ORAL at 02:02

## 2019-02-22 RX ADMIN — SODIUM CHLORIDE 500 ML: 0.9 INJECTION, SOLUTION INTRAVENOUS at 04:02

## 2019-02-22 RX ADMIN — LEVOFLOXACIN 500 MG: 500 TABLET, FILM COATED ORAL at 10:02

## 2019-02-22 RX ADMIN — TRAZODONE HYDROCHLORIDE 50 MG: 50 TABLET ORAL at 01:02

## 2019-02-22 RX ADMIN — KETOROLAC TROMETHAMINE 15 MG: 30 INJECTION, SOLUTION INTRAMUSCULAR at 12:02

## 2019-02-22 RX ADMIN — SODIUM CHLORIDE: 0.9 INJECTION, SOLUTION INTRAVENOUS at 12:02

## 2019-02-22 NOTE — HPI
Tasha Hawley is a 63 y.o.female with hypothyroidism, coronary artery disease with history of acute coronary syndrome in January 2016 and ischemic cardiomyopathy (ejection fraction since improved), thoracic aortic atherosclerosis, severe left atrial enlargement, diastolic dysfunction, gastroesophageal reflux disease status post Nissen fundoplication, right facial droop since birth, history of work-related back injury in the 1990s with scoliosis, lumbar degenerative disc disease, lumbar facet arthropathy, history of multiple fusions and spacers from L3-S1, status post spinal cord stimulator and intrathecal hydromorphone pump, chronic constipation, neurogenic bladder status post suprapubic catheter placement, recurrent  urinary tract infections, obstructive sleep apnea, lower extremity lymphedema, chronic insomnia, and depression.  She has difficulty ambulating at baseline, but able to ambulate with walker. She lives in [place], [place].  She is .  Her primary care physician is Dr. Mesfin Hodges. Her urologist is Dr. Shireen Mayo.  Her gastroenterologist is Dr. Prince Vance.  Her psychiatrist is Dr. Erik Oconnor.    She presented to [facility] 2/21/19 with complaint of left substernal chest pain. Onset over the past 2 days. Pt describes pain as constant pressure/heavy sensation radiating to left shoulder. Symptoms started at rest. Pt reports exacerbation with deep breathing. She denies alleviating factors. She reports associated palpitations, SOB and nausea/vomiting. She reports hx of chronic lower extremity lymphedema. She was recently started on antibiotics for management of recurrent UTI.     Labs in ED remarkable for BNP (124). Initial troponin negative. Non-specific T wave  Abnormality on EKG. VSS. CXR shows shallow inspiratory effort, no acute cardiopulmonary process. Pt admitted to CDU for further care.

## 2019-02-22 NOTE — PLAN OF CARE
Visit with pt for d/c planning, pt lives at home with spouse, independent with use of rolling walker.  Spouse does the house cleaning, cooking, helps with bathing and washing hair.  Pt uses [facility] for nurse visits only, no therapy.  Pt has a suprapubic catheter that is changed every 3 weeks by ;home health nurse.  No issues at this time with catheter. At time of d/c pt will be returning home with same services.    Discharge planning brochure provided. White board updated with CM name & contact info.  Pt encouraged to call with any questions or needs. CM will continue to follow patient throughout the transitions of care, and assist with any discharge needs.       02/22/19 1104   Discharge Assessment   Assessment Type Discharge Planning Assessment   Confirmed/corrected address and phone number on facesheet? Yes   Assessment information obtained from? Patient   Expected Length of Stay (days) 1   Communicated expected length of stay with patient/caregiver yes   Prior to hospitilization cognitive status: Alert/Oriented   Prior to hospitalization functional status: Independent;Assistive Equipment   Current cognitive status: Alert/Oriented   Current Functional Status: Independent;Assistive Equipment   Lives With spouse   Able to Return to Prior Arrangements yes   Is patient able to care for self after discharge? Yes   Readmission Within the Last 30 Days no previous admission in last 30 days   Patient currently being followed by outpatient case management? No   Patient currently receives any other outside agency services? No   Equipment Currently Used at Home bath bench;walker, rolling;power chair;bedside commode   Do you have any problems affording any of your prescribed medications? No   Is the patient taking medications as prescribed? yes   Does the patient have transportation home? Yes   Transportation Anticipated family or friend will provide   Does the patient receive services at the [facility]? No    Discharge Plan A Home with family;Home Health  ([facility])   Discharge Plan B Home with family   DME Needed Upon Discharge  none   Patient/Family in Agreement with Plan yes       Bernarda Aldana, REJI    [phone]

## 2019-02-22 NOTE — H&P
[facility] Medicine  History & Physical    Patient Name: Tasha Hawley  MRN: [id number]  Admission Date: 2/21/2019  Attending Physician: Isaias Anderson MD   Primary Care Provider: Mesfin Hodges Ii, MD         Patient information was obtained from patient, past medical records and ER records.     Subjective:     Principal Problem:Chest pain    Chief Complaint:   Chief Complaint   Patient presents with    Chest Pain     PT C/O LEFT SIDED CHEST PAIN RADIAITNG DOWN LEFT ARM ONSET SATURDAY.        HPI: Tasha Hawley is a 63 y.o.female with hypothyroidism, coronary artery disease with history of acute coronary syndrome in January 2016 and ischemic cardiomyopathy (ejection fraction since improved), thoracic aortic atherosclerosis, severe left atrial enlargement, diastolic dysfunction, gastroesophageal reflux disease status post Nissen fundoplication, right facial droop since birth, history of work-related back injury in the 1990s with scoliosis, lumbar degenerative disc disease, lumbar facet arthropathy, history of multiple fusions and spacers from L3-S1, status post spinal cord stimulator and intrathecal hydromorphone pump, chronic constipation, neurogenic bladder status post suprapubic catheter placement, recurrent  urinary tract infections, obstructive sleep apnea, lower extremity lymphedema, chronic insomnia, and depression.  She has difficulty ambulating at baseline, but able to ambulate with walker. She lives in [place], [place].  She is .  Her primary care physician is Dr. Mesfin Hodges. Her urologist is Dr. Shireen Mayo.  Her gastroenterologist is Dr. Prince Vance.  Her psychiatrist is Dr. Erik Oconnor.    She [facility] with complaint of left substernal chest pain. Onset over the past 2 days. Pt describes pain as constant pressure/heavy sensation radiating to left shoulder. Symptoms started at rest. Pt reports exacerbation with deep  breathing. She denies alleviating factors. She reports associated palpitations, SOB and nausea/vomiting. She reports hx of chronic lower extremity lymphedema. She was recently started on antibiotics for management of recurrent UTI.     Labs in ED remarkable for BNP (124). Initial troponin negative. Non-specific T wave  Abnormality on EKG. VSS. CXR shows shallow inspiratory effort, no acute cardiopulmonary process. Pt admitted to CDU for further care.                    Past Medical History:   Diagnosis Date    Anticoagulant long-term use     Anxiety     Arthritis     Bilateral lower extremity edema     severe chronic    Carotid artery occlusion     Cataract     Depression     Fever blister     Hypothyroid     Iron deficiency anemia     Lumbar radiculopathy     with chronic pain    Ocular migraine     Sleep apnea     cpap       Past Surgical History:   Procedure Laterality Date    ADENOIDECTOMY      BACK SURGERY      x 12    BIOPSY-BLADDER N/A 3/20/2018    Performed by Sh[facility]en Mayo MD at [facility] OR 1ST FLR    BIOPSY-BLADDER N/A 1/26/2017    Performed by Sh[facility]en Mayo MD at [facility] OR 1ST FLR    CATARACT EXTRACTION W/  INTRAOCULAR LENS IMPLANT Left     Dr Coleman     CYSTOSCOPY N/A 3/20/2018    Performed by Shireen Mayo MD at [facility] OR 1ST FLR    CYSTOSCOPY N/A 1/26/2017    Performed by Sh[facility]en Mayo MD at [facility] OR 1ST FLR    CYSTOSCOPY N/A 11/8/2016    Performed by Shireen Mayo MD at [facility] OR 2ND FLR    DISCECTOMY, SPINE, CERVICAL, ANTERIOR APPROACH, WITH FUSION N/A 5/13/2013    Performed by Larry Martinez MD at [facility] OR 2ND FLR    ESOPHAGOGASTRODUODENOSCOPY (EGD) N/A 5/23/2018    Performed by Prince Vance MD at [facility] ENDO (4TH FLR)    ESOPHAGOGASTRODUODENOSCOPY (EGD) N/A 7/21/2017    Performed by Prince Vance MD at [facility] ENDO (4TH FLR)    ESOPHAGOGASTRODUODENOSCOPY (EGD) w/ dilation N/A 8/28/2017    Performed by Prince Vance MD at [facility] ENDO (4TH FLR)     FULGURATION-BLADDER N/A 1/26/2017    Performed by [facility] at [facility] OR 1ST FLR    HEART CATH-LEFT Left 1/7/2016    Performed by Alberto Gee MD at [facility] CATH LAB    HYSTERECTOMY  1975    endometriosis    INJECTION-BOTOX N/A 3/20/2018    Performed by Shireen Mayo MD at [facility] OR 1ST [facility]LR    INJECTION-BOTOX N/A 1/26/2017    Performed by [facility]en [facility] at [facility] OR 1ST FLR    INJECTION-BULKING AGENT URETHRAL  OUTLET N/A 3/20/2018    Performed by Shireen Mayo MD at [facility] OR 1ST FLR    INSERTION-INTRAOCULAR LENS (IOL) Left 11/13/2014    Performed by Sanjana Coleman MD at [facility] OR 1ST FLR    INSERTION-SUPRAPUBIC TUBE-OPEN N/A 11/8/2016    Performed by Sh[facility]en Mayo MD at [facility] OR 2ND FLR    MANOMETRY-ESOPHAGEAL-WITH IMPEDANCE N/A 4/23/2018    Performed by Robert Menendez MD at [facility] ENDO (4TH FLR)    MYELOGRAM N/A 2/19/2013    Performed by [facility] at [facility] OR 2ND FLR    pain pump placement      SQ Dilaudid Pump managed by Dr. Hillman, Pain Management    PHACOEMULSIFICATION-ASPIRATION-CATARACT Left 11/13/2014    Performed by Sanjana Coleman MD at [facility] OR 1ST FLR    FL UPPER GI ENDOSCOPY,DIAGNOSIS [22892 (CPT®)] N/A 7/16/2014    Performed by Prince Vance MD at [facility] ENDO (4TH FLR)    SPINAL CORD STIMULATOR REMOVAL      before Larissa    SPINE SURGERY  5-13-13    CERVICAL FUSION    TONSILLECTOMY         Review of patient's allergies indicates:   Allergen Reactions    (d)-limonene flavor      Other reaction(s): difficult intubation  Other reaction(s): Difficulty breathing    Bactrim [sulfamethoxazole-trimethoprim] Anaphylaxis    Benadryl [diphenhydramine hcl] Shortness Of Breath    Imitrex [sumatriptan succinate] Shortness Of Breath    Topamax [topiramate] Shortness Of Breath    Vancomycin Shortness Of Breath     Rash    Butorphanol tartrate     Darvocet a500 [propoxyphene n-acetaminophen]      Other reaction(s): Difficulty breathing    Fentanyl      Other  reaction(s): Vomiting  Other reaction(s): Nausea  Other reaction(s): Itching  swelling    White petrolatum-zinc     Zinc oxide-white petrolatum      Other reaction(s): Difficulty breathing    Latex, natural rubber Itching and Rash    Phenytoin Rash and Other (See Comments)     Trouble breathing       No current facility-administered medications on file prior to encounter.      Current Outpatient Medications on File Prior to Encounter   Medication Sig    amoxicillin (AMOXIL) 500 MG capsule Take 1 capsule (500 mg total) by mouth 3 (three) times daily. for 7 days    aspirin (ECOTRIN) 81 MG EC tablet Take 1 tablet (81 mg total) by mouth once daily.    atorvastatin (LIPITOR) 80 MG tablet TAKE 1 TABLET BY MOUTH DAILY    buPROPion (WELLBUTRIN XL) 300 MG 24 hr tablet Take 1 tablet (300 mg total) by mouth once daily.    butalbital-aspirin-caffeine -40 mg (FIORINAL) -40 mg Cap Take 1 capsule by mouth 3 (three) times daily as needed.    clopidogrel (PLAVIX) 75 mg tablet Take 75 mg by mouth once daily.    DOC-Q-LACE 100 mg capsule TAKE ONE CAPSULE BY MOUTH THREE TIMES DAILY AS NEEDED FOR CONSTIPATION    docosanol (ABREVA) 10 % Crea Apply 1 application topically 2 (two) times daily.    FLUoxetine 20 MG capsule Take 3 capsules (60 mg total) by mouth once daily.    furosemide (LASIX) 40 MG tablet Take 1 tablet (40 mg total) by mouth 2 (two) times daily.    hydrocodone-acetaminophen 10-325mg (NORCO)  mg Tab Take 2 tablets by mouth every 4 (four) hours as needed for Pain.     Lactobacillus acidophilus (PROBIOTIC) 10 billion cell Cap Take 1 capsule by mouth once daily.    levoFLOXacin (LEVAQUIN) 500 MG tablet Take 1 tablet (500 mg total) by mouth once daily. for 7 days    levothyroxine (SYNTHROID) 125 MCG tablet Take 1 tablet (125 mcg total) by mouth before breakfast.    lidocaine (LIDODERM) 5 % APPLY 1 PATCH DAILY AND WEAR FOR A MAXIMUM OF 12 HOURS    oxybutynin (DITROPAN XL) 15 MG TR24 Take 1  tablet (15 mg total) by mouth once daily.    pantoprazole (PROTONIX) 40 MG tablet Take 1 tablet (40 mg total) by mouth once daily.    potassium chloride SA (K-DUR,KLOR-CON) 20 MEQ tablet Take 20 mEq by mouth 2 (two) times daily.    potassium citrate (UROCIT-K) 10 mEq (1,080 mg) TbSR Take 1 tablet (10 mEq total) by mouth 3 (three) times daily with meals.    QUEtiapine (SEROQUEL) 100 MG Tab Take 1 tablet (100 mg total) by mouth every evening.    ranitidine (ZANTAC) 150 MG capsule Take 1 capsule (150 mg total) by mouth 2 (two) times daily.    SENNOSIDES (SENNA LAX ORAL) Take 20 mg by mouth every evening.     tiZANidine (ZANAFLEX) 4 MG tablet     traZODone (DESYREL) 100 MG tablet Take 2 tablets (200 mg total) by mouth every evening.    [DISCONTINUED] lamotrigine (LAMICTAL) 25 MG tablet Take 1 tablet (25 mg total) by mouth once daily.     Family History     Problem Relation (Age of Onset)    Cancer Mother (55), Father    Esophageal cancer Father    Heart disease Maternal Uncle    No Known Problems Brother, Brother, Sister, Maternal Aunt, Paternal Aunt, Paternal Uncle, Maternal Grandfather, Paternal Grandmother, Paternal Grandfather    Parkinsonism Maternal Grandmother    Tremor Maternal Grandmother        Tobacco Use    Smoking status: Never Smoker    Smokeless tobacco: Never Used   Substance and Sexual Activity    Alcohol use: Yes    Drug use: No    Sexual activity: No     Review of Systems   Constitutional: Negative for chills, diaphoresis and fever.   HENT: Negative for congestion.    Eyes: Negative for photophobia.   Respiratory: Positive for shortness of breath. Negative for cough, chest tightness and wheezing.    Cardiovascular: Positive for chest pain and palpitations. Negative for leg swelling.   Gastrointestinal: Positive for nausea and vomiting. Negative for abdominal pain and diarrhea.   Genitourinary: Negative for dysuria, flank pain, frequency and hematuria.   Musculoskeletal: Negative for back  pain and myalgias.   Skin: Negative for color change.   Neurological: Negative for dizziness, syncope, light-headedness and headaches.   Psychiatric/Behavioral: Negative for confusion.     Objective:     Vital Signs (Most Recent):  Temp: 98.6 °F (37 °C) (02/22/19 0034)  Pulse: 65 (02/22/19 0405)  Resp: 18 (02/22/19 0034)  BP: (!) 99/52 (02/22/19 0034)  SpO2: (!) 94 % (02/22/19 0405) Vital Signs (24h Range):  Temp:  [98.6 °F (37 °C)-98.9 °F (37.2 °C)] 98.6 °F (37 °C)  Pulse:  [65-83] 65  Resp:  [16-18] 18  SpO2:  [94 %-98 %] 94 %  BP: ()/(50-57) 99/52     Weight: 69.9 kg (154 lb)  Body mass index is 26.43 kg/m².    Physical Exam   Constitutional: She is oriented to person, place, and time. She appears well-developed and well-nourished.   Appears chronically ill    HENT:   Head: Normocephalic and atraumatic.   Eyes: Conjunctivae are normal. Pupils are equal, round, and reactive to light.   Neck: Normal range of motion. No JVD present.   Cardiovascular: Normal rate, regular rhythm, normal heart sounds and intact distal pulses.   Pulmonary/Chest: Effort normal. No respiratory distress. She has no wheezes.   Abdominal: Soft. Bowel sounds are normal. She exhibits no distension. There is no tenderness. There is no guarding.   Musculoskeletal: Normal range of motion. She exhibits no edema or tenderness.   Neurological: She is alert and oriented to person, place, and time.   Skin: Skin is warm and dry. Capillary refill takes less than 2 seconds.   Psychiatric: She has a normal mood and affect. Her behavior is normal.         CRANIAL NERVES     CN III, IV, VI   Pupils are equal, round, and reactive to light.       Significant Labs:   BMP:   Recent Labs   Lab 02/21/19 2121         K 4.3      CO2 26   BUN 10   CREATININE 0.8   CALCIUM 9.4     CBC:   Recent Labs   Lab 02/21/19 2121   WBC 9.11   HGB 11.9*   HCT 37.1        TSH:   Recent Labs   Lab 02/21/19  2351   TSH 0.744     Urine Studies:    Recent Labs   Lab 02/22/19  0046   COLORU Straw   APPEARANCEUA Clear   PHUR 6.0   SPECGRAV <=1.005*   PROTEINUA Negative   GLUCUA Negative   KETONESU Negative   BILIRUBINUA Negative   OCCULTUA Trace*   NITRITE Positive*   UROBILINOGEN Negative   LEUKOCYTESUR 1+*   RBCUA 1   WBCUA 12*   BACTERIA Occasional   SQUAMEPITHEL 0       Significant Imaging: I have reviewed all pertinent imaging results/findings within the past 24 hours.    Assessment/Plan:     * Chest pain    Coronary artery disease involving native coronary artery of native heart without angina pectoris  Ischemic cardiomyopathy  Chronic diastolic dysfunction     -trend cardiac enzymes   -monitor telemetry   -check D-dimer, consider CTA  Chest   -check TTE   -continue ASA, statin, plavix        Insomnia due to medical condition    Taking Seroquel and Trazodone        Lymphedema of both lower extremities    Chronic, stable        Primary hypothyroidism    Continue synthroid        Neurogenic bladder    Recurrent UTI (Proteus Mirabilis)   Chronic suprapubic cath     Patient followed by Dr. Mika HASSAN outpatient. Recently started on amoxicillin and Levaquin for  Management of UTI-continue      GERD (gastroesophageal reflux disease)    Continue PPI        Chronic pain syndrome    Degenerative disc disease, lumbar  Osteoarthritis of spine with radiculopathy, lumbar region  S/P insertion of intrathecal pump  Narcotic dependency, continuous    Chronic, followed by pain management outpatient   Avoid po narcotics 2/2 hypotension    Pt also has intrathecal pump in place with Dilaudid infusing (patient unable to tell dosing schedule)      Iron deficiency anemia    H/H stable, monitor        VTE Risk Mitigation (From admission, onward)        Ordered     enoxaparin injection 40 mg  Daily      02/22/19 0327     IP VTE LOW RISK PATIENT  Once      02/21/19 2350     Place sequential compression device  Until discontinued      02/21/19 2350             Susi Verdin,  NP  Department of Hospital Medicine   [facility]

## 2019-02-22 NOTE — ED PROVIDER NOTES
Encounter Date: 2/21/2019    SCRIBE #1 NOTE: I, Romina Leung, am scribing for, and in the presence of,  Dr. Stevenson. I have scribed the entire note.     I, Dr. Fredi Stevenson MD, personally performed the services described in this documentation. All medical record entries made by the scribe were at my direction and in my presence.  I have reviewed the chart and agree that the record reflects my personal performance and is accurate and complete. Fredi Stevenson MD.    History     Chief Complaint   Patient presents with    Chest Pain     PT C/O LEFT SIDED CHEST PAIN RADIAITNG DOWN LEFT ARM ONSET SATURDAY.     CHIEF COMPLAINT: Patient presents with:  Chest Pain: PT C/O LEFT SIDED CHEST PAIN RADIAITNG DOWN LEFT ARM ONSET SATURDAY.      HISTORY OF PRESENT ILLNESS: Tasha Hawley who is a 63 y.o. presents to the emergency department today with complaint of chest pain. She reports onset of symptoms was 5 days ago. The patient describes the pain as pressure which has worsened today. She notes she began with shortness of breath today. The patient denies any associated palpitations, nausea or vomiting. She does admit to chronic leg swelling from lymphedema. The patient denies a history of heart disease but admits to family history by mother and uncle. Of note the patient admits to having urine infection and being treated with antibiotics by her doctor      ALLERGIES REVIEWED  MEDICATIONS REVIEWED  PMH/PSH/SOC/FH REVIEWED     The history is provided by the patient.    Nursing/Ancillary staff note reviewed.          The history is provided by the patient.     Review of patient's allergies indicates:   Allergen Reactions    (d)-limonene flavor      Other reaction(s): difficult intubation  Other reaction(s): Difficulty breathing    Bactrim [sulfamethoxazole-trimethoprim] Anaphylaxis    Benadryl [diphenhydramine hcl] Shortness Of Breath    Imitrex [sumatriptan succinate] Shortness Of Breath    Topamax [topiramate]  Shortness Of Breath    Vancomycin Shortness Of Breath     Rash    Butorphanol tartrate     Darvocet a500 [propoxyphene n-acetaminophen]      Other reaction(s): Difficulty breathing    Fentanyl      Other reaction(s): Vomiting  Other reaction(s): Nausea  Other reaction(s): Itching  swelling    White petrolatum-zinc     Zinc oxide-white petrolatum      Other reaction(s): Difficulty breathing    Latex, natural rubber Itching and Rash    Phenytoin Rash and Other (See Comments)     Trouble breathing     Past Medical History:   Diagnosis Date    Anticoagulant long-term use     Anxiety     Arthritis     Bilateral lower extremity edema     severe chronic    Carotid artery occlusion     Cataract     Depression     Fever blister     Hypothyroid     Iron deficiency anemia     Lumbar radiculopathy     with chronic pain    Ocular migraine     Sleep apnea     cpap     Past Surgical History:   Procedure Laterality Date    ADENOIDECTOMY      BACK SURGERY      x 12    BIOPSY-BLADDER N/A 3/20/2018    Performed by [facility] at [facility] OR 1ST FLR    BIOPSY-BLADDER N/A 1/26/2017    Performed by [facility] at [facility] OR 1ST FLR    CATARACT EXTRACTION W/  INTRAOCULAR LENS IMPLANT Left     Dr Coleman     CYSTOSCOPY N/A 3/20/2018    Performed by [facility] at [facility] OR [facility]ST FLR    CYSTOSCOPY N/A 1/26/2017    Performed by [facility] at [facility] OR 1ST FLR    CYSTOSCOPY N/A 11/8/2016    Performed by [facility] at [facility] OR 2ND FLR    DISCECTOMY, SPINE, CERVICAL, ANTERIOR APPROACH, WITH FUSION N/A 5/13/2013    Performed by Larry Martinez MD at [facility] OR 2ND FLR    ESOPHAGOGASTRODUODENOSCOPY (EGD) N/A 5/23/2018    Performed by Prince Vance MD at [facility] ENDO (4TH FLR)    ESOPHAGOGASTRODUODENOSCOPY (EGD) N/A 7/21/2017    Performed by Prince [facility]ance MD at [facility] ENDO (4TH FLR)    ESOPHAGOGASTRODUODENOSCOPY (EGD) w/ dilation N/A 8/28/2017    Performed by Prince Vance MD  at [facility] ENDO (4TH FLR)    FULGURATION-BLADDER N/A 1/26/2017    Performed by [facility]en [facility] MD at [facility] OR 1ST FLR    HEART CATH-LEFT Left 1/7/2016    Performed by Alberto Gee MD at [facility] CATH LAB    HYSTERECTOMY  1975    endometriosis    INJECTION-BOTOX N/A 3/20/2018    Performed by Shieren Mayo MD at [facility] OR 1ST FLR    INJECTION-BOTOX N/A 1/26/2017    Performed by Shireen Mayo MD at [facility] OR 1ST FLR    INJECTION-BULKING AGENT URETHRAL  OUTLET N/A 3/20/2018    Performed by Shireen Mayo MD at [facility] OR 1ST FLR    INSERTION-INTRAOCULAR LENS (IOL) Left 11/13/2014    Performed by Sanjana Coleman MD at [facility] OR 1ST FLR    INSERTION-SUPRAPUBIC TUBE-OPEN N/A 11/8/2016    Performed by Shireen Mayo MD at [facility] OR 2ND FLR    MANOMETRY-ESOPHAGEAL-WITH IMPEDANCE N/A 4/23/2018    Performed by Robert Menendez MD at [facility] ENDO (4TH FLR)    MYELOGRAM N/A 2/19/2013    Performed by [facility] Provider at [facility] OR 2ND FLR    pain pump placement      SQ Dilaudid Pump managed by Dr. Hillman, Pain Management    PHACOEMULSIFICATION-ASPIRATION-CATARACT Left 11/13/2014    Performed by Sanjana Coleman MD at [facility] OR 1ST FLR    UT UPPER GI ENDOSCOPY,DIAGNOSIS [63927 (CPT®)] N/A 7/16/2014    Performed by Prince Vance MD at [facility] ENDO (4TH FLR)    SPINAL CORD STIMULATOR REMOVAL      before Larissa    SPINE SURGERY  5-13-13    CERVICAL FUSION    TONSILLECTOMY       Family History   Problem Relation Age of Onset    Cancer Mother 55        breast    Cancer Father         esophagus,had laryngectomy    Esophageal cancer Father     Parkinsonism Maternal Grandmother     Tremor Maternal Grandmother     No Known Problems Brother     No Known Problems Brother     Heart disease Maternal Uncle     No Known Problems Sister     No Known Problems Maternal Aunt     No Known Problems Paternal Aunt     No Known Problems Paternal Uncle     No Known Problems Maternal Grandfather     No Known Problems  Paternal Grandmother     No Known Problems Paternal Grandfather     Melanoma Neg Hx     Amblyopia Neg Hx     Blindness Neg Hx     Cataracts Neg Hx     Diabetes Neg Hx     Glaucoma Neg Hx     Hypertension Neg Hx     Macular degeneration Neg Hx     Retinal detachment Neg Hx     Strabismus Neg Hx     Stroke Neg Hx     Thyroid disease Neg Hx     Colon cancer Neg Hx      Social History     Tobacco Use    Smoking status: Never Smoker    Smokeless tobacco: Never Used   Substance Use Topics    Alcohol use: Yes    Drug use: No     Review of Systems   Constitutional: Negative for activity change, appetite change, chills, diaphoresis and fever.   HENT: Negative for congestion, drooling, ear pain, mouth sores, rhinorrhea, sinus pain, sore throat and trouble swallowing.    Eyes: Negative for pain and discharge.   Respiratory: Positive for shortness of breath. Negative for cough, chest tightness, wheezing and stridor.    Cardiovascular: Positive for chest pain and leg swelling (chronic). Negative for palpitations.   Gastrointestinal: Negative for abdominal distention, abdominal pain, blood in stool, constipation, diarrhea, nausea and vomiting.   Genitourinary: Negative for difficulty urinating, dysuria, flank pain, frequency, hematuria and urgency.   Musculoskeletal: Negative for arthralgias, back pain and myalgias.   Skin: Negative for pallor, rash and wound.   Neurological: Negative for dizziness, syncope, weakness, light-headedness and numbness.   All other systems reviewed and are negative.      Physical Exam     Initial Vitals [02/21/19 2055]   BP Pulse Resp Temp SpO2   (!) 101/50 83 18 98.9 °F (37.2 °C) 98 %      MAP       --         Physical Exam    Nursing note and vitals reviewed.  Constitutional: She appears well-developed and well-nourished.   HENT:   Head: Normocephalic and atraumatic.   Right Ear: External ear normal.   Left Ear: External ear normal.   Nose: Nose normal.   Mouth/Throat: Oropharynx  is clear and moist.   Eyes: Conjunctivae and EOM are normal. Pupils are equal, round, and reactive to light. No scleral icterus.   Neck: Normal range of motion. Neck supple. No JVD present.   Cardiovascular: Normal rate, regular rhythm, normal heart sounds and intact distal pulses. Exam reveals no gallop and no friction rub.    No murmur heard.  Pulmonary/Chest: Breath sounds normal. No stridor. No respiratory distress. She has no wheezes. She exhibits no tenderness.   Abdominal: Soft. Bowel sounds are normal. She exhibits no distension and no mass. There is no tenderness. There is no rebound and no guarding.   Musculoskeletal: Normal range of motion. She exhibits no edema or tenderness.   Back is nontender to palpation.    Neurological: She is alert and oriented to person, place, and time. She has normal strength. No cranial nerve deficit.   Skin: Skin is warm and dry. Capillary refill takes less than 2 seconds. No rash noted. No pallor.   Psychiatric: She has a normal mood and affect. Thought content normal.         ED Course   Procedures  Labs Reviewed   CBC W/ AUTO DIFFERENTIAL - Abnormal; Notable for the following components:       Result Value    Hemoglobin 11.9 (*)     Gran% 76.3 (*)     Lymph% 11.6 (*)     All other components within normal limits   COMPREHENSIVE METABOLIC PANEL - Abnormal; Notable for the following components:    Albumin 3.1 (*)     All other components within normal limits   B-TYPE NATRIURETIC PEPTIDE - Abnormal; Notable for the following components:     (*)     All other components within normal limits   TROPONIN I   LIPID PANEL   TSH     EKG Readings: (Independently Interpreted)   Normal sinus rhythm at 77 bpm. Left axis deviation. Non-specific T wave abnormality. No STEMI. Similar to previous tracing on 12/30/2018     ECG Results          EKG 12-lead (Final result)  Result time 02/22/19 08:08:46    Final result by Interface, Lab In [facility] (02/22/19 08:08:46)                  Narrative:    Test Reason : R07.9,    Vent. Rate : 077 BPM     Atrial Rate : 077 BPM     P-R Int : 152 ms          QRS Dur : 102 ms      QT Int : 386 ms       P-R-T Axes : 066 -15 063 degrees     QTc Int : 436 ms    Normal sinus rhythm  Nonspecific T wave abnormality  Abnormal ECG  When compared with ECG of 30-DEC-2018 02:17,  No significant change was found  Confirmed by Walter Goldstein MD ([id number]) on 2/22/2019 8:08:33 AM    Referred By: [email or web address]   SELF           Confirmed By:Walter Goldstein MD                            Imaging Results          X-Ray Chest AP Portable (Final result)  Result time 02/21/19 21:35:00    Final result by Simone Rivera MD (02/21/19 21:35:00)                 Impression:      1. Shallow inspiratory effort, no acute cardiopulmonary process.      Electronically signed by: Simone Rivera MD  Date:    02/21/2019  Time:    21:35             Narrative:    EXAMINATION:  XR CHEST AP PORTABLE    CLINICAL HISTORY:  Chest Pain;    TECHNIQUE:  Single frontal view of the chest was performed.    COMPARISON:  12/29/2018    FINDINGS:  The cardiomediastinal silhouette is enlarged, similar to the previous exam noting tortuosity and calcification of the aorta.  There is a spinal stimulator..  There is no pleural effusion.  The trachea is midline.  The lungs are symmetrically expanded bilaterally without evidence of acute parenchymal process.  There is minimal bilateral basilar subsegmental atelectasis.  No large focal consolidation seen.  There is no pneumothorax.  The osseous structures are remarkable for degenerative changes, noting postsurgical changes of the cervical spine..                                 Medical Decision Making:   Initial Assessment:   Tasha Hawley 63 y.o. presents to the ED today with chest pain. Pt has various risk factors. Will obtain labs, EKG, CXR to further evaluate. Treat symptoms appropriately and reassess.   Differential Diagnosis:   Myocardial ischemia,  pericarditis, pulmonary embolus, chest wall pain, pleural inflammation and pulmonary infectious causes.    Independently Interpreted Test(s):   I have ordered and independently interpreted EKG Reading(s) - see prior notes  Clinical Tests:   Lab Tests: Ordered and Reviewed  Radiological Study: Ordered and Reviewed  Medical Tests: Ordered and Reviewed  ED Management:  10:01 PM The patient is currently sleeping    10:33 PM [facility]    10:34 PM Case discussed with Velvet NP-Hospitalist, will come to evaluate and admit the patient.                     Clinical Impression:     1. Costochondritis    2. Chest pain    3. Dyspnea, unspecified type    4. Chest pain, unspecified type    5. Coronary artery disease involving native coronary artery of native heart without angina pectoris    6. Acute cystitis without hematuria                             Fredi Stevenson MD  03/06/19 [id number]

## 2019-02-22 NOTE — HOSPITAL COURSE
The patient was admitted to the CDU for observation. Troponin was trended, negative x3. D-dimer was elevated, CTA negative for PE. Patient still c/o CP after 1 dose of Toradol 15 mg IV given, consulted cardiology. We aprreciate Cardiology, per Cards no need for additional cardiac workup.  B/P range 80-99 over night and this AM. Patients b/p has been noted to be chronically low in clinic. Patient is asymptomatic. NS bolus given overnight and this AM, NS @ 100 ml/hr infusing.

## 2019-02-22 NOTE — PROGRESS NOTES
[facility] Hospital Medicine  Progress Note    Patient Name: Tasha Hawley  MRN: [id number]  Patient Class: OP- Observation   Admission Date: 2/21/2019  Length of Stay: 0 days  Attending Physician: Isaias Anderson MD  Primary Care Provider: Mesfin Hodges Ii, MD        Subjective:     Principal Problem: Chest pain    HPI:  Tasha Hawley is a 63 y.o.female with hypothyroidism, coronary artery disease with history of acute coronary syndrome in January 2016 and ischemic cardiomyopathy (ejection fraction since improved), thoracic aortic atherosclerosis, severe left atrial enlargement, diastolic dysfunction, gastroesophageal reflux disease status post Nissen fundoplication, right facial droop since birth, history of work-related back injury in the 1990s with scoliosis, lumbar degenerative disc disease, lumbar facet arthropathy, history of multiple fusions and spacers from L3-S1, status post spinal cord stimulator and intrathecal hydromorphone pump, chronic constipation, neurogenic bladder status post suprapubic catheter placement, recurrent  urinary tract infections, obstructive sleep apnea, lower extremity lymphedema, chronic insomnia, and depression.  She has difficulty ambulating at baseline, but able to ambulate with walker. She lives in [place], [place].  She is .  Her primary care physician is Dr. Mesfin Hodges. Her urologist is Dr. Shireen Mayo.  Her gastroenterologist is Dr. Prince Vance.  Her psychiatrist is Dr. Erik Oconnor.    She presented to [facility] 2/21/19 with complaint of left substernal chest pain. Onset over the past 2 days. Pt describes pain as constant pressure/heavy sensation radiating to left shoulder. Symptoms started at rest. Pt reports exacerbation with deep breathing. She denies alleviating factors. She reports associated palpitations, SOB and nausea/vomiting. She reports hx of chronic lower extremity lymphedema. She was recently started on  antibiotics for management of recurrent UTI.     Labs in ED remarkable for BNP (124). Initial troponin negative. Non-specific T wave  Abnormality on EKG. VSS. CXR shows shallow inspiratory effort, no acute cardiopulmonary process. Pt admitted to CDU for further care.                    Hospital Course:  The patient was admitted to the CDU for observation. Troponin was trended, negative x3. D-dimer was elevated, CTA negative for PE. Patient still c/o CP after 1 dose of Toradol 15 mg IV given, consulted cardiology. B/P range 80-99 over night and this AM. NS bolus given overnight and this AM, NS @ 100 ml/hr infusing.     Interval History: The patient was in bed AAOx4, patient is c/o pain to the left side of the chest wall. Patient states the pain radiates to the left shoulder and left arm. At this time the patient is also c/o SOB. No family present at the bedside.    Review of Systems   Constitutional: Positive for activity change. Negative for diaphoresis, fatigue and fever.   HENT: Negative for trouble swallowing.    Respiratory: Positive for shortness of breath. Negative for cough and wheezing.    Cardiovascular: Positive for chest pain (6/10 to the left chest wall radiating to the left arm). Negative for palpitations.   Gastrointestinal: Negative for abdominal distention, abdominal pain, blood in stool, constipation, diarrhea, nausea and vomiting.   Genitourinary: Negative for difficulty urinating and hematuria.   Musculoskeletal: Positive for gait problem (walks with a walker). Negative for arthralgias and back pain.   Skin: Negative for color change and pallor.   Neurological: Positive for weakness (generalized ). Negative for dizziness, syncope, facial asymmetry, speech difficulty, light-headedness, numbness and headaches.   Hematological: Does not bruise/bleed easily.   Psychiatric/Behavioral: Negative for agitation, confusion and hallucinations. The patient is not nervous/anxious.      Objective:     Vital  Signs (Most Recent):  Temp: 97.8 °F (36.6 °C) (02/22/19 1136)  Pulse: (!) 59 (02/22/19 1146)  Resp: 16 (02/22/19 1136)  BP: (!) 86/47 (02/22/19 1136)  SpO2: 98 % (02/22/19 1136) Vital Signs (24h Range):  Temp:  [97.8 °F (36.6 °C)-98.9 °F (37.2 °C)] 97.8 °F (36.6 °C)  Pulse:  [59-83] 59  Resp:  [16-18] 16  SpO2:  [94 %-98 %] 98 %  BP: ()/(43-57) 86/47     Weight: 69.9 kg (154 lb)  Body mass index is 26.43 kg/m².    Intake/Output Summary (Last 24 hours) at 2/22/2019 1337  Last data filed at 2/21/2019 2240  Gross per 24 hour   Intake --   Output 1040 ml   Net -1040 ml      Physical Exam   Constitutional: She is oriented to person, place, and time. She appears well-developed and well-nourished.   HENT:   Head: Normocephalic and atraumatic.   Eyes: Conjunctivae and EOM are normal. Pupils are equal, round, and reactive to light.   Neck: Normal range of motion. No JVD present.   Cardiovascular: Normal heart sounds and intact distal pulses.   No murmur heard.  Pulmonary/Chest: Effort normal and breath sounds normal. No respiratory distress. She has no wheezes.   Abdominal: Soft. Bowel sounds are normal. She exhibits no distension. There is no tenderness. There is no guarding.   Musculoskeletal: She exhibits no edema or tenderness.   Neurological: She is alert and oriented to person, place, and time.   Skin: Skin is warm and dry. Capillary refill takes less than 2 seconds. No erythema. No pallor.   Psychiatric: She has a normal mood and affect. Her behavior is normal. Judgment and thought content normal.   Nursing note and vitals reviewed.      Significant Labs:   CBC:   Recent Labs   Lab 02/21/19 2121   WBC 9.11   HGB 11.9*   HCT 37.1        CMP:   Recent Labs   Lab 02/21/19 2121      K 4.3      CO2 26      BUN 10   CREATININE 0.8   CALCIUM 9.4   PROT 6.9   ALBUMIN 3.1*   BILITOT 0.7   ALKPHOS 101   AST 17   ALT 17   ANIONGAP 8   EGFRNONAA >60     Cardiac Markers:   Recent Labs   Lab  02/21/19 2121   *     Lipid Panel:   Recent Labs   Lab 02/21/19 2121   CHOL 122   HDL 47   LDLCALC 63.8   TRIG 56   CHOLHDL 38.5     Troponin:   Recent Labs   Lab 02/21/19 2121 02/22/19  0500 02/22/19  1107   TROPONINI 0.007 <0.006 <0.006     TSH:   Recent Labs   Lab 02/21/19  2351   TSH 0.744     Urine Studies:   Recent Labs   Lab 02/22/19  0046   COLORU Straw   APPEARANCEUA Clear   PHUR 6.0   SPECGRAV <=1.005*   PROTEINUA Negative   GLUCUA Negative   KETONESU Negative   BILIRUBINUA Negative   OCCULTUA Trace*   NITRITE Positive*   UROBILINOGEN Negative   LEUKOCYTESUR 1+*   RBCUA 1   WBCUA 12*   BACTERIA Occasional   SQUAMEPITHEL 0       Significant Imaging: I have reviewed all pertinent imaging results/findings within the past 24 hours.    Assessment/Plan:      * Hypotension    Per the patient SBP is usually low 100's.  -SBP range   -NS bolus given, NS at 100 ml/hr infusing  -patient has an  intrathecal pump in place with Dilaudid infusing (patient unable to tell dosing schedule) which is managed by pain management outpatient.  -monitor       Chest pain    Coronary artery disease involving native coronary artery of native heart without angina pectoris  Ischemic cardiomyopathy  Chronic diastolic dysfunction   Dyspnea    -trend cardiac enzymes----0.007>0.006>0.006  -TTE:  · Normal left ventricular systolic function. The estimated ejection fraction is 55%  · Normal LV diastolic function.  · Concentric left ventricular remodeling.  · Mild left atrial enlargement.  · Normal right ventricular systolic function.  -consult cardiology  -d-dimer---0.82  -STAT EKG for c/o CP  -monitor telemetry   -CTA  Chest---negative for PE  -continue ASA, statin, plavix        Recurrent UTI (urinary tract infection)           Ischemic cardiomyopathy           Suprapubic catheter           Insomnia due to medical condition    Generalized anxiety disorder    Taking Seroquel and Trazodone        Osteoarthritis of spine with  radiculopathy, lumbar region           Degenerative disc disease, lumbar           S/P insertion of intrathecal pump           Lymphedema of both lower extremities    Chronic, stable        Primary hypothyroidism    Continue synthroid        Urinary retention           Neurogenic bladder    Recurrent UTI (Proteus Mirabilis)   Chronic suprapubic cath   Urinary retention    Patient followed by Dr. Mika HASSAN outpatient. Recently started on amoxicillin and Levaquin for  Management of UTI-continue      Coronary artery disease involving native coronary artery of native heart without angina pectoris           GERD (gastroesophageal reflux disease)    -Pantoprazole 40 mg PO daily       Chronic pain syndrome    Degenerative disc disease, lumbar  Osteoarthritis of spine with radiculopathy, lumbar region  S/P insertion of intrathecal pump  Narcotic dependency, continuous  Chronic.  -followed by pain management outpatient   -Avoid PO narcotics d/t hypotension    -Pt also has intrathecal pump in place with Dilaudid infusing (patient unable to tell dosing schedule)      Iron deficiency anemia    H/H stable---11.9   monitor      Generalized anxiety disorder             VTE Risk Mitigation (From admission, onward)        Ordered     IP VTE LOW RISK PATIENT  Once      02/21/19 2350     Place sequential compression device  Until discontinued      02/21/19 2350              Edy Theodore NP  Department of Hospital Medicine   [facility]

## 2019-02-22 NOTE — ASSESSMENT & PLAN NOTE
Coronary artery disease involving native coronary artery of native heart without angina pectoris  Ischemic cardiomyopathy  Chronic diastolic dysfunction     -trend cardiac enzymes   -monitor telemetry   -check D-dimer, consider CTA  Chest   -check TTE   -continue ASA, statin, plavix

## 2019-02-22 NOTE — SUBJECTIVE & OBJECTIVE
Interval History: The patient was in bed AAOx4, patient is c/o pain to the left side of the chest wall. Patient states the pain radiates to the left shoulder and left arm. At this time the patient is also c/o SOB. No family present at the bedside.    Review of Systems   Constitutional: Positive for activity change. Negative for diaphoresis, fatigue and fever.   HENT: Negative for trouble swallowing.    Respiratory: Positive for shortness of breath. Negative for cough and wheezing.    Cardiovascular: Positive for chest pain (6/10 to the left chest wall radiating to the left arm). Negative for palpitations.   Gastrointestinal: Negative for abdominal distention, abdominal pain, blood in stool, constipation, diarrhea, nausea and vomiting.   Genitourinary: Negative for difficulty urinating and hematuria.   Musculoskeletal: Positive for gait problem (walks with a walker). Negative for arthralgias and back pain.   Skin: Negative for color change and pallor.   Neurological: Positive for weakness (generalized ). Negative for dizziness, syncope, facial asymmetry, speech difficulty, light-headedness, numbness and headaches.   Hematological: Does not bruise/bleed easily.   Psychiatric/Behavioral: Negative for agitation, confusion and hallucinations. The patient is not nervous/anxious.      Objective:     Vital Signs (Most Recent):  Temp: 97.8 °F (36.6 °C) (02/22/19 1136)  Pulse: (!) 59 (02/22/19 1146)  Resp: 16 (02/22/19 1136)  BP: (!) 86/47 (02/22/19 1136)  SpO2: 98 % (02/22/19 1136) Vital Signs (24h Range):  Temp:  [97.8 °F (36.6 °C)-98.9 °F (37.2 °C)] 97.8 °F (36.6 °C)  Pulse:  [59-83] 59  Resp:  [16-18] 16  SpO2:  [94 %-98 %] 98 %  BP: ()/(43-57) 86/47     Weight: 69.9 kg (154 lb)  Body mass index is 26.43 kg/m².    Intake/Output Summary (Last 24 hours) at 2/22/2019 0467  Last data filed at 2/21/2019 2240  Gross per 24 hour   Intake --   Output 1040 ml   Net -1040 ml      Physical Exam   Constitutional: She is  oriented to person, place, and time. She appears well-developed and well-nourished.   HENT:   Head: Normocephalic and atraumatic.   Eyes: Conjunctivae and EOM are normal. Pupils are equal, round, and reactive to light.   Neck: Normal range of motion. No JVD present.   Cardiovascular: Normal heart sounds and intact distal pulses.   No murmur heard.  Pulmonary/Chest: Effort normal and breath sounds normal. No respiratory distress. She has no wheezes.   Abdominal: Soft. Bowel sounds are normal. She exhibits no distension. There is no tenderness. There is no guarding.   Musculoskeletal: She exhibits no edema or tenderness.   Neurological: She is alert and oriented to person, place, and time.   Skin: Skin is warm and dry. Capillary refill takes less than 2 seconds. No erythema. No pallor.   Psychiatric: She has a normal mood and affect. Her behavior is normal. Judgment and thought content normal.   Nursing note and vitals reviewed.      Significant Labs:   CBC:   Recent Labs   Lab 02/21/19 2121   WBC 9.11   HGB 11.9*   HCT 37.1        CMP:   Recent Labs   Lab 02/21/19 2121      K 4.3      CO2 26      BUN 10   CREATININE 0.8   CALCIUM 9.4   PROT 6.9   ALBUMIN 3.1*   BILITOT 0.7   ALKPHOS 101   AST 17   ALT 17   ANIONGAP 8   EGFRNONAA >60     Cardiac Markers:   Recent Labs   Lab 02/21/19 2121   *     Lipid Panel:   Recent Labs   Lab 02/21/19 2121   CHOL 122   HDL 47   LDLCALC 63.8   TRIG 56   CHOLHDL 38.5     Troponin:   Recent Labs   Lab 02/21/19 2121 02/22/19  0500 02/22/19  1107   TROPONINI 0.007 <0.006 <0.006     TSH:   Recent Labs   Lab 02/21/19  2351   TSH 0.744     Urine Studies:   Recent Labs   Lab 02/22/19  0046   COLORU Straw   APPEARANCEUA Clear   PHUR 6.0   SPECGRAV <=1.005*   PROTEINUA Negative   GLUCUA Negative   KETONESU Negative   BILIRUBINUA Negative   OCCULTUA Trace*   NITRITE Positive*   UROBILINOGEN Negative   LEUKOCYTESUR 1+*   RBCUA 1   WBCUA 12*   BACTERIA  Occasional   SQUAMEPITHEL 0       Significant Imaging: I have reviewed all pertinent imaging results/findings within the past 24 hours.

## 2019-02-22 NOTE — HPI
Tasha Hawley is a 63 y.o.female with hypothyroidism, coronary artery disease, thoracic aortic atherosclerosis, severe left atrial enlargement, diastolic dysfunction, gastroesophageal reflux disease status post Nissen fundoplication, right facial droop since birth, history of work-related back injury in the 1990s with scoliosis, lumbar degenerative disc disease, lumbar facet arthropathy, history of multiple fusions and spacers from L3-S1, status post spinal cord stimulator and intrathecal hydromorphone pump, chronic constipation, neurogenic bladder status post suprapubic catheter placement, recurrent  urinary tract infections, obstructive sleep apnea, lower extremity lymphedema, chronic insomnia, and depression.  She has difficulty ambulating at baseline, but able to ambulate with walker.  Patient presented to the ED with left chest pain x 3 days. Her pain has been constant since onset and is reproducible on palpation. Pain radiates to her left abdomen. She has never experienced this pain before and it is different from her myocardial pain experienced in the past.   Symptoms started at rest.  She denies alleviating factors. She reports associated palpitations, SOB and nausea/vomiting.   Troponin negative x 3  EKG SR without acute ischemic changes  CTA chest negative for PE

## 2019-02-22 NOTE — ASSESSMENT & PLAN NOTE
Degenerative disc disease, lumbar  Osteoarthritis of spine with radiculopathy, lumbar region  S/P insertion of intrathecal pump  Narcotic dependency, continuous  Chronic.  -followed by pain management outpatient   -Avoid PO narcotics d/t hypotension    -Pt also has intrathecal pump in place with Dilaudid infusing (patient unable to tell dosing schedule)

## 2019-02-22 NOTE — ASSESSMENT & PLAN NOTE
Recurrent UTI (Proteus Mirabilis)   Chronic suprapubic cath   Urinary retention    Patient followed by Dr. Mika HASSAN outpatient. Recently started on amoxicillin and Levaquin for  Management of UTI-continue

## 2019-02-22 NOTE — ASSESSMENT & PLAN NOTE
01/2016 [facility]  - Left Main Coronary Artery:             The LM is normal. There is BRENDEN 3 flow.       - Left Anterior Descending Artery:             The mid LAD has subtotal. There is BRENDEN 3 flow. There is collateral flow from the RCA (good). The remaining portion of the vessel is of small caliber.                     Lesion Details:  The length is 30mm. The reference vessel diameter is 1.5 millimeters. The distal LAD appears to be a small caliber vessel that does not quite reach the apex.       - Left Circumflex Artery:             The LCX is normal. There is BRENDEN 3 flow.       - Right Coronary Artery:             The RCA is normal. There is BRENDEN 3 flow.      D. SUMMARY/POST-OPERATIVE DIAGNOSIS:    1. Single vessel coronary artery disease.  2. Subtotal mid LAD but the remainder of the LAD appears to be of small caliber and not attractive for PCI.    Continue medical management  Chest pain is atypical and 100% reproducible on palpation   EKG NSR  Troponin negative   No further cardiac work up needed at this time

## 2019-02-22 NOTE — DISCHARGE INSTRUCTIONS
Costochondritis (English) View Edit Remove   Chest Wall Pain, Costochondritis (English) View Edit Remove

## 2019-02-22 NOTE — ASSESSMENT & PLAN NOTE
Coronary artery disease involving native coronary artery of native heart without angina pectoris  Ischemic cardiomyopathy  Chronic diastolic dysfunction   Dyspnea    -trend cardiac enzymes----0.007>0.006>0.006  -TTE:  · Normal left ventricular systolic function. The estimated ejection fraction is 55%  · Normal LV diastolic function.  · Concentric left ventricular remodeling.  · Mild left atrial enlargement.  · Normal right ventricular systolic function.  -consult cardiology  -d-dimer---0.82  -STAT EKG for c/o CP  -monitor telemetry   -CTA  Chest---negative for PE  -continue ASA, statin, plavix

## 2019-02-22 NOTE — DISCHARGE SUMMARY
[facility] Hospital Medicine  Discharge Summary      Patient Name: Tasha Hawley  MRN: [id number]  Admission Date: 2/21/2019  Hospital Length of Stay: 0 days  Discharge Date and Time:  02/22/2019 5:23 PM  Attending Physician: Isaias Anderson MD   Discharging Provider: Edy Theodore NP  Primary Care Provider: Mesfin Hodges Ii, MD      HPI:   Tasha Hawley is a 63 y.o.female with hypothyroidism, coronary artery disease with history of acute coronary syndrome in January 2016 and ischemic cardiomyopathy (ejection fraction since improved), thoracic aortic atherosclerosis, severe left atrial enlargement, diastolic dysfunction, gastroesophageal reflux disease status post Nissen fundoplication, right facial droop since birth, history of work-related back injury in the 1990s with scoliosis, lumbar degenerative disc disease, lumbar facet arthropathy, history of multiple fusions and spacers from L3-S1, status post spinal cord stimulator and intrathecal hydromorphone pump, chronic constipation, neurogenic bladder status post suprapubic catheter placement, recurrent  urinary tract infections, obstructive sleep apnea, lower extremity lymphedema, chronic insomnia, and depression.  She has difficulty ambulating at baseline, but able to ambulate with walker. She lives in [place], [place].  She is .  Her primary care physician is Dr. Mesfin Hodges. Her urologist is Dr. Shireen Mayo.  Her gastroenterologist is Dr. Prince Vance.  Her psychiatrist is Dr. Erik Oconnor.    She presented to [facility] 2/21/19 with complaint of left substernal chest pain. Onset over the past 2 days. Pt describes pain as constant pressure/heavy sensation radiating to left shoulder. Symptoms started at rest. Pt reports exacerbation with deep breathing. She denies alleviating factors. She reports associated palpitations, SOB and nausea/vomiting. She reports hx of chronic lower extremity lymphedema. She  was recently started on antibiotics for management of recurrent UTI.     Labs in ED remarkable for BNP (124). Initial troponin negative. Non-specific T wave  Abnormality on EKG. VSS. CXR shows shallow inspiratory effort, no acute cardiopulmonary process. Pt admitted to CDU for further care.                    * No surgery found *      Hospital Course:   The patient was admitted to the CDU for observation. Troponin was trended, negative x3. D-dimer was elevated, CTA negative for PE. Patient still c/o CP after 1 dose of Toradol 15 mg IV given, consulted cardiology. We aprreciate Cardiology, per Cards no need for additional cardiac workup.  B/P range 80-99 over night and this AM. Patients b/p has been noted to be chronically low in clinic. Patient is asymptomatic. NS bolus given overnight and this AM, NS @ 100 ml/hr infusing.      Consults:   Consults (From admission, onward)        Status Ordering Provider     Inpatient consult to [facility]     Provider:  Emre Calderon MD    Completed BANDAR-ESTRELLA NOLAND          * Costochondritis           Hypotension    Per the patient SBP is usually low 100's. We back through the clinic notes and it was noted that the patients b/p normally runs 80's-low 100's systolic.     -SBP range   -NS bolus given, NS at 100 ml/hr infusing  -patient has an  intrathecal pump in place with Dilaudid infusing (patient unable to tell dosing schedule) which is managed by pain management outpatient.  -will have the patient f/u with her primary care doctor       Dyspnea           Recurrent UTI (urinary tract infection)           Ischemic cardiomyopathy           Suprapubic catheter           Insomnia due to medical condition    Generalized anxiety disorder    Taking Seroquel and Trazodone        Osteoarthritis of spine with radiculopathy, lumbar region           Degenerative disc disease, lumbar           S/P insertion of intrathecal pump           Lymphedema of both  lower extremities    Chronic, stable        Primary hypothyroidism    Continue synthroid        Urinary retention           Neurogenic bladder    Recurrent UTI (Proteus Mirabilis)   Chronic suprapubic cath   Urinary retention    Patient followed by Dr. Mika HASSAN outpatient. Recently started on amoxicillin and Levaquin for  Management of UTI-continue      Coronary artery disease involving native coronary artery of native heart without angina pectoris           GERD (gastroesophageal reflux disease)    -Pantoprazole 40 mg PO daily       Narcotic dependency, continuous           Chronic pain syndrome    Degenerative disc disease, lumbar  Osteoarthritis of spine with radiculopathy, lumbar region  S/P insertion of intrathecal pump  Narcotic dependency, continuous  Chronic.  -followed by pain management outpatient   -cont all meds as directed      Iron deficiency anemia    H/H stable---11.9   monitor      Generalized anxiety disorder           Chest pain-resolved as of 2/22/2019    Coronary artery disease involving native coronary artery of native heart without angina pectoris  Ischemic cardiomyopathy  Chronic diastolic dysfunction   Dyspnea (resolved)  Costochondritis     -trend cardiac enzymes----0.007>0.006>0.006  -TTE:  · Normal left ventricular systolic function. The estimated ejection fraction is 55%  · Normal LV diastolic function.  · Concentric left ventricular remodeling.  · Mild left atrial enlargement.  · Normal right ventricular systolic function.  -consulted cardiology---no additional cardiac workup needed, pain reproducible with palpation.   -d-dimer---0.82  -CTA  Chest---negative for PE  -continue ASA, statin, plavix   -can cont all home meds at home to help with chronic pain         Final Active Diagnoses:    Diagnosis Date Noted POA    PRINCIPAL PROBLEM:  Costochondritis [M94.0] 02/22/2019 Yes    Dyspnea [R06.00] 02/22/2019 Yes    Hypotension [I95.9] 02/22/2019 Yes    Recurrent UTI (urinary tract  infection) [N39.0] 06/08/2018 Yes    Ischemic cardiomyopathy [I25.5] 03/08/2018 Yes     Chronic    Suprapubic catheter [Z93.59] 02/01/2018 Not Applicable     Chronic    Insomnia due to medical condition [G47.01] 12/08/2017 Yes     Chronic    S/P insertion of intrathecal pump [Z98.890] 03/31/2017 Not Applicable     Chronic    Degenerative disc disease, lumbar [M51.36] 03/31/2017 Yes     Chronic    Osteoarthritis of spine with radiculopathy, lumbar region [M47.26] 03/31/2017 Yes     Chronic    Lymphedema of both lower extremities [I89.0] 03/02/2017 Yes     Chronic    Primary hypothyroidism [E03.9] 02/02/2017 Yes     Chronic    Urinary retention [R33.9] 12/21/2016 Yes     Chronic    Neurogenic bladder [N31.9] 09/27/2016 Yes     Chronic    GERD (gastroesophageal reflux disease) [K21.9] 08/16/2016 Yes     Chronic    Coronary artery disease involving native coronary artery of native heart without angina pectoris [I25.10] 08/16/2016 Yes     Chronic    Narcotic dependency, continuous [F11.20] 07/18/2014 Yes     Chronic    Chronic pain syndrome [G89.4] 05/01/2013 Yes     Chronic    Generalized anxiety disorder [F41.1]  Yes    Iron deficiency anemia [D50.9]  Yes      Problems Resolved During this Admission:    Diagnosis Date Noted Date Resolved POA    Chest pain [R07.9] 02/21/2019 02/22/2019 Yes       Discharged Condition: good    Disposition: Home or Self Care    Follow Up:  Follow-up Information     Mesfin Hodges Ii, MD On 3/7/2019.    Specialty:  Internal Medicine  Why:  9:20 am  Contact information:  [address]  [facility] [address]  [phone]                 Patient Instructions:      Diet Cardiac     Notify your health care provider if you experience any of the following:  temperature >100.4     Notify your health care provider if you experience any of the following:  persistent nausea and vomiting or diarrhea     Notify your health care provider if you experience any of the following:   severe uncontrolled pain     Notify your health care provider if you experience any of the following:  difficulty breathing or increased cough     Notify your health care provider if you experience any of the following:  severe persistent headache     Notify your health care provider if you experience any of the following:  worsening rash     Notify your health care provider if you experience any of the following:  persistent dizziness, light-headedness, or visual disturbances     Notify your health care provider if you experience any of the following:  increased confusion or weakness     Activity as tolerated       Significant Diagnostic Studies: Labs:   CMP   Recent Labs   Lab 02/21/19 2121      K 4.3      CO2 26      BUN 10   CREATININE 0.8   CALCIUM 9.4   PROT 6.9   ALBUMIN 3.1*   BILITOT 0.7   ALKPHOS 101   AST 17   ALT 17   ANIONGAP 8   ESTGFRAFRICA >60   EGFRNONAA >60   , CBC   Recent Labs   Lab 02/21/19 2121   WBC 9.11   HGB 11.9*   HCT 37.1       and Troponin   Recent Labs   Lab 02/22/19  1107   TROPONINI <0.006       Pending Diagnostic Studies:     None         Medications:  Reconciled Home Medications:      Medication List      CONTINUE taking these medications    amoxicillin 500 MG capsule  Commonly known as:  AMOXIL  Take 1 capsule (500 mg total) by mouth 3 (three) times daily. for 7 days     aspirin 81 MG EC tablet  Commonly known as:  ECOTRIN  Take 1 tablet (81 mg total) by mouth once daily.     atorvastatin 80 MG tablet  Commonly known as:  LIPITOR  TAKE 1 TABLET BY MOUTH DAILY     buPROPion 300 MG 24 hr tablet  Commonly known as:  WELLBUTRIN XL  Take 1 tablet (300 mg total) by mouth once daily.     butalbital-aspirin-caffeine -40 mg -40 mg Cap  Commonly known as:  FIORINAL  Take 1 capsule by mouth 3 (three) times daily as needed.     clopidogrel 75 mg tablet  Commonly known as:  PLAVIX  Take 75 mg by mouth once daily.     DOC-Q-LACE 100 MG capsule  Generic  drug:  docusate sodium  TAKE ONE CAPSULE BY MOUTH THREE TIMES DAILY AS NEEDED FOR CONSTIPATION     docosanol 10 % Crea  Commonly known as:  ABREVA  Apply 1 application topically 2 (two) times daily.     FLUoxetine 20 MG capsule  Take 3 capsules (60 mg total) by mouth once daily.     furosemide 40 MG tablet  Commonly known as:  LASIX  Take 1 tablet (40 mg total) by mouth 2 (two) times daily.     HYDROcodone-acetaminophen  mg per tablet  Commonly known as:  NORCO  Take 2 tablets by mouth every 4 (four) hours as needed for Pain.     Lactobacillus acidophilus 10 billion cell Cap  Commonly known as:  PROBIOTIC  Take 1 capsule by mouth once daily.     levoFLOXacin 500 MG tablet  Commonly known as:  LEVAQUIN  Take 1 tablet (500 mg total) by mouth once daily. for 7 days     levothyroxine 125 MCG tablet  Commonly known as:  SYNTHROID  Take 1 tablet (125 mcg total) by mouth before breakfast.     lidocaine 5 %  Commonly known as:  LIDODERM  APPLY 1 PATCH DAILY AND WEAR FOR A MAXIMUM OF 12 HOURS     oxybutynin 15 MG Tr24  Commonly known as:  DITROPAN XL  Take 1 tablet (15 mg total) by mouth once daily.     pantoprazole 40 MG tablet  Commonly known as:  PROTONIX  Take 1 tablet (40 mg total) by mouth once daily.     potassium chloride SA 20 MEQ tablet  Commonly known as:  K-DUR,KLOR-CON  Take 20 mEq by mouth 2 (two) times daily.     potassium citrate 10 mEq (1,080 mg) Tbsr  Commonly known as:  UROCIT-K  Take 1 tablet (10 mEq total) by mouth 3 (three) times daily with meals.     QUEtiapine 100 MG Tab  Commonly known as:  SEROQUEL  Take 1 tablet (100 mg total) by mouth every evening.     ranitidine 150 MG capsule  Commonly known as:  ZANTAC  Take 1 capsule (150 mg total) by mouth 2 (two) times daily.     SENNA LAX ORAL  Take 20 mg by mouth every evening.     tiZANidine 4 MG tablet  Commonly known as:  ZANAFLEX     traZODone 100 MG tablet  Commonly known as:  DESYREL  Take 2 tablets (200 mg total) by mouth every evening.         STOP taking these medications    lamoTRIgine 25 MG tablet  Commonly known as:  LAMICTAL            Indwelling Lines/Drains at time of discharge:   Lines/Drains/Airways     Drain                 Suprapubic Catheter 06/09/18 258 days         Suprapubic Catheter 12/28/18 0100 56 days                Time spent on the discharge of patient: 35 minutes  Patient was seen and examined on the date of discharge and determined to be suitable for discharge.         Edy Theodore NP  Department of Hospital Medicine  [facility]

## 2019-02-22 NOTE — NURSING
Patient arrived to unit from ED via stretcher .   Patient in room 479A.  Transferred into bed with 2 assist.   Bedside report given by Milind MENDOZA.  Charge nurse advised of patient arrival.   VS currently stable.   Tele monitor [id number] applied.   Patient oriented to room, rounding sheet and call bell.   Bed in lowest position, call light in reach.  Encouraged to notify of all needs.   Will continue to monitor.  Velvet MERCHANT informed of patient arrival and medication request.

## 2019-02-22 NOTE — ASSESSMENT & PLAN NOTE
Coronary artery disease involving native coronary artery of native heart without angina pectoris  Ischemic cardiomyopathy  Chronic diastolic dysfunction   Dyspnea (resolved)  Costochondritis     -trend cardiac enzymes----0.007>0.006>0.006  -TTE:  · Normal left ventricular systolic function. The estimated ejection fraction is 55%  · Normal LV diastolic function.  · Concentric left ventricular remodeling.  · Mild left atrial enlargement.  · Normal right ventricular systolic function.  -consulted cardiology---no additional cardiac workup needed, pain reproducible with palpation.   -d-dimer---0.82  -CTA  Chest---negative for PE  -continue ASA, statin, plavix   -can cont all home meds at home to help with chronic pain

## 2019-02-22 NOTE — SUBJECTIVE & OBJECTIVE
Past Medical History:   Diagnosis Date    Anticoagulant long-term use     Anxiety     Arthritis     Bilateral lower extremity edema     severe chronic    Carotid artery occlusion     Cataract     Depression     Fever blister     Hypothyroid     Iron deficiency anemia     Lumbar radiculopathy     with chronic pain    Ocular migraine     Sleep apnea     cpap       Past Surgical History:   Procedure Laterality Date    ADENOIDECTOMY      BACK SURGERY      x 12    BIOPSY-BLADDER N/A 3/20/2018    [facility]formed [facility]en [facility] at [facility] OR [facility]ST [facility]    BIOPSY-BLADDER N/A 1/26/2017    [facility] at [facility]    CATARACT EXTRACTION W/  INTRAOCULAR LENS IMPLANT Left     Dr Coleman     CYSTOSCOPY N/A 3/20/2018    [facility] by [facility] at [facility]    CYSTOSCOPY N/A 1/26/2017    [facility] by [facility] at [facility]    CYSTOSCOPY N/A 11/8/2016    Per[facility] by [facility] at [facility] OR 2ND [facility]LR    DISCECTOMY, SPINE, CERVICAL, ANTERIOR APPROACH, WITH FUSION N/A 5/13/2013    Performed by Larry Martinez MD at [facility] OR 2ND [facility]LR    ESOPHAGOGASTRODUODENOSCOPY (EGD) N/A 5/23/2018    Performed by Prince Vance MD at [facility] ENDO (4TH FLR)    ESOPHAGOGASTRODUODENOSCOPY (EGD) N/A 7/21/2017    Performed by [facility] at [facility] ENDO (4TH [facility]LR)    ESOPHAGOGASTRODUODENOSCOPY (EGD) w/ dilation N/A 8/28/2017    Performed by Prince [facility]ance [facility] at [facility] ENDO (4TH FLR)    FULGURATION-BLADDER N/A 1/26/2017    Per[facility] by [facility] at [facility] OR [facility]    HEART CATH-LEFT Left 1/7/2016    Per[facility] Alberto Gee [facility] at [facility] CATH LAB    HYSTERECTOMY  1975    endometriosis    INJECTION-BOTOX N/A 3/20/2018    [facility] by [facility]    INJECTION-BOTOX N/A 1/26/2017    Per[facility] by [facility] at [facility] OR [facility]    INJECTION-BULKING AGENT URETHRAL  OUTLET N/A 3/20/2018    Performed by Shireen PICKETT  MD [facility] at [facility] OR 1ST FLR    INSERTION-INTRAOCULAR LENS (IOL) Left 11/13/2014    Performed by Sanjana Coleman MD at [facility] OR 1ST [facility]LR    INSERTION-SUPRAPUBIC TUBE-OPEN N/A 11/8/2016    Performed by Shireen [facility] MD at [facility] OR 2ND FLR    MANOMETRY-ESOPHAGEAL-WITH IMPEDANCE N/A 4/23/2018    Performed by Robert Menendez MD at [facility] ENDO (4TH FLR)    MYELOGRAM N/A 2/19/2013    Performed by [facility] Provider at [facility] OR 2ND FLR    pain pump placement      SQ Dilaudid Pump managed by Dr. Hillman, Pain Management    PHACOEMULSIFICATION-ASPIRATION-CATARACT Left 11/13/2014    Performed by Sanjana Coleman MD at [facility] OR 1ST FLR    VT UPPER GI ENDOSCOPY,DIAGNOSIS [80049 (CPT®)] N/A 7/16/2014    Performed by Prince Vance MD at [facility] ENDO (4TH FLR)    SPINAL CORD STIMULATOR REMOVAL      before Larissa    SPINE SURGERY  5-13-13    CERVICAL FUSION    TONSILLECTOMY         Review of patient's allergies indicates:   Allergen Reactions    (d)-limonene flavor      Other reaction(s): difficult intubation  Other reaction(s): Difficulty breathing    Bactrim [sulfamethoxazole-trimethoprim] Anaphylaxis    Benadryl [diphenhydramine hcl] Shortness Of Breath    Imitrex [sumatriptan succinate] Shortness Of Breath    Topamax [topiramate] Shortness Of Breath    Vancomycin Shortness Of Breath     Rash    Butorphanol tartrate     Darvocet a500 [propoxyphene n-acetaminophen]      Other reaction(s): Difficulty breathing    Fentanyl      Other reaction(s): Vomiting  Other reaction(s): Nausea  Other reaction(s): Itching  swelling    White petrolatum-zinc     Zinc oxide-white petrolatum      Other reaction(s): Difficulty breathing    Latex, natural rubber Itching and Rash    Phenytoin Rash and Other (See Comments)     Trouble breathing       No current facility-administered medications on file prior to encounter.      Current Outpatient Medications on File Prior to Encounter   Medication Sig    amoxicillin  (AMOXIL) 500 MG capsule Take 1 capsule (500 mg total) by mouth 3 (three) times daily. for 7 days    aspirin (ECOTRIN) 81 MG EC tablet Take 1 tablet (81 mg total) by mouth once daily.    atorvastatin (LIPITOR) 80 MG tablet TAKE 1 TABLET BY MOUTH DAILY    buPROPion (WELLBUTRIN XL) 300 MG 24 hr tablet Take 1 tablet (300 mg total) by mouth once daily.    butalbital-aspirin-caffeine -40 mg (FIORINAL) -40 mg Cap Take 1 capsule by mouth 3 (three) times daily as needed.    clopidogrel (PLAVIX) 75 mg tablet Take 75 mg by mouth once daily.    DOC-Q-LACE 100 mg capsule TAKE ONE CAPSULE BY MOUTH THREE TIMES DAILY AS NEEDED FOR CONSTIPATION    docosanol (ABREVA) 10 % Crea Apply 1 application topically 2 (two) times daily.    FLUoxetine 20 MG capsule Take 3 capsules (60 mg total) by mouth once daily.    furosemide (LASIX) 40 MG tablet Take 1 tablet (40 mg total) by mouth 2 (two) times daily.    hydrocodone-acetaminophen 10-325mg (NORCO)  mg Tab Take 2 tablets by mouth every 4 (four) hours as needed for Pain.     Lactobacillus acidophilus (PROBIOTIC) 10 billion cell Cap Take 1 capsule by mouth once daily.    levoFLOXacin (LEVAQUIN) 500 MG tablet Take 1 tablet (500 mg total) by mouth once daily. for 7 days    levothyroxine (SYNTHROID) 125 MCG tablet Take 1 tablet (125 mcg total) by mouth before breakfast.    lidocaine (LIDODERM) 5 % APPLY 1 PATCH DAILY AND WEAR FOR A MAXIMUM OF 12 HOURS    oxybutynin (DITROPAN XL) 15 MG TR24 Take 1 tablet (15 mg total) by mouth once daily.    pantoprazole (PROTONIX) 40 MG tablet Take 1 tablet (40 mg total) by mouth once daily.    potassium chloride SA (K-DUR,KLOR-CON) 20 MEQ tablet Take 20 mEq by mouth 2 (two) times daily.    potassium citrate (UROCIT-K) 10 mEq (1,080 mg) TbSR Take 1 tablet (10 mEq total) by mouth 3 (three) times daily with meals.    QUEtiapine (SEROQUEL) 100 MG Tab Take 1 tablet (100 mg total) by mouth every evening.    ranitidine (ZANTAC) 150  MG capsule Take 1 capsule (150 mg total) by mouth 2 (two) times daily.    SENNOSIDES (SENNA LAX ORAL) Take 20 mg by mouth every evening.     tiZANidine (ZANAFLEX) 4 MG tablet     traZODone (DESYREL) 100 MG tablet Take 2 tablets (200 mg total) by mouth every evening.    [DISCONTINUED] lamotrigine (LAMICTAL) 25 MG tablet Take 1 tablet (25 mg total) by mouth once daily.     Family History     Problem Relation (Age of Onset)    Cancer Mother (55), Father    Esophageal cancer Father    Heart disease Maternal Uncle    No Known Problems Brother, Brother, Sister, Maternal Aunt, Paternal Aunt, Paternal Uncle, Maternal Grandfather, Paternal Grandmother, Paternal Grandfather    Parkinsonism Maternal Grandmother    Tremor Maternal Grandmother        Tobacco Use    Smoking status: Never Smoker    Smokeless tobacco: Never Used   Substance and Sexual Activity    Alcohol use: Yes    Drug use: No    Sexual activity: No     Review of Systems   Constitution: Negative for diaphoresis, weakness and malaise/fatigue.   HENT: Negative.    Eyes: Negative.    Cardiovascular: Positive for chest pain and leg swelling. Negative for irregular heartbeat, near-syncope, orthopnea, palpitations, paroxysmal nocturnal dyspnea and syncope.   Respiratory: Negative for cough and shortness of breath.    Endocrine: Negative.    Hematologic/Lymphatic: Negative.    Skin: Negative.    Musculoskeletal: Positive for arthritis, back pain, joint pain and myalgias.   Gastrointestinal: Positive for abdominal pain and nausea.   Genitourinary: Positive for dysuria.   Neurological: Negative.    Psychiatric/Behavioral: Negative.    Allergic/Immunologic: Negative.      Objective:     Vital Signs (Most Recent):  Temp: 97.8 °F (36.6 °C) (02/22/19 1136)  Pulse: (!) 59 (02/22/19 1146)  Resp: 16 (02/22/19 1136)  BP: (!) 86/47 (02/22/19 1136)  SpO2: 98 % (02/22/19 1136) Vital Signs (24h Range):  Temp:  [97.8 °F (36.6 °C)-98.9 °F (37.2 °C)] 97.8 °F (36.6 °C)  Pulse:   [59-83] 59  Resp:  [16-18] 16  SpO2:  [94 %-98 %] 98 %  BP: ()/(43-57) 86/47     Weight: 69.9 kg (154 lb)  Body mass index is 26.43 kg/m².    SpO2: 98 %  O2 Device (Oxygen Therapy): room air      Intake/Output Summary (Last 24 hours) at 2/22/2019 1359  Last data filed at 2/21/2019 2240  Gross per 24 hour   Intake --   Output 1040 ml   Net -1040 ml       Lines/Drains/Airways     Drain                 Suprapubic Catheter 06/09/18 258 days         Suprapubic Catheter 12/28/18 0100 56 days          Peripheral Intravenous Line                 Peripheral IV - Single Lumen 02/21/19 2120 Right Wrist less than 1 day                Physical Exam   Constitutional: She is oriented to person, place, and time. She appears well-developed and well-nourished. No distress.   HENT:   Head: Normocephalic and atraumatic.   Eyes: Right eye exhibits no discharge. Left eye exhibits no discharge.   Neck: No JVD present.   Cardiovascular: Normal rate, regular rhythm and normal heart sounds. Exam reveals no gallop and no friction rub.   No murmur heard.  Pulmonary/Chest: Effort normal and breath sounds normal. She has no wheezes. She has no rales. She exhibits tenderness.   Abdominal: Soft. Bowel sounds are normal.   Musculoskeletal: She exhibits edema.   Neurological: She is alert and oriented to person, place, and time.   Skin: Skin is warm and dry. She is not diaphoretic.       Significant Labs:   BMP:   Recent Labs   Lab 02/21/19 2121         K 4.3      CO2 26   BUN 10   CREATININE 0.8   CALCIUM 9.4   , CMP   Recent Labs   Lab 02/21/19 2121      K 4.3      CO2 26      BUN 10   CREATININE 0.8   CALCIUM 9.4   PROT 6.9   ALBUMIN 3.1*   BILITOT 0.7   ALKPHOS 101   AST 17   ALT 17   ANIONGAP 8   ESTGFRAFRICA >60   EGFRNONAA >60   , CBC   Recent Labs   Lab 02/21/19 2121   WBC 9.11   HGB 11.9*   HCT 37.1      , INR No results for input(s): INR, PROTIME in the last 48 hours., Lipid Panel    Recent Labs   Lab 02/21/19 2121   CHOL 122   HDL 47   LDLCALC 63.8   TRIG 56   CHOLHDL 38.5   , Troponin   Recent Labs   Lab 02/21/19  2121 02/22/19  0500 02/22/19  1107   TROPONINI 0.007 <0.006 <0.006    and All pertinent lab results from the last 24 hours have been reviewed.    Significant Imaging: Echocardiogram:   2D echo with color flow doppler:   Results for orders placed or performed during the hospital encounter of 08/20/17   2D echo with color flow doppler   Result Value Ref Range    QEF 65 55 - 65    Diastolic Dysfunction Yes (A)     Pericardial Effusion NONE

## 2019-02-22 NOTE — ASSESSMENT & PLAN NOTE
Degenerative disc disease, lumbar  Osteoarthritis of spine with radiculopathy, lumbar region  S/P insertion of intrathecal pump  Narcotic dependency, continuous    Chronic, followed by pain management outpatient   Avoid po narcotics 2/2 hypotension    Pt also has intrathecal pump in place with Dilaudid infusing (patient unable to tell dosing schedule)

## 2019-02-22 NOTE — ASSESSMENT & PLAN NOTE
Per the patient SBP is usually low 100's.  -SBP range   -NS bolus given, NS at 100 ml/hr infusing  -patient has an  intrathecal pump in place with Dilaudid infusing (patient unable to tell dosing schedule) which is managed by pain management outpatient.  -monitor

## 2019-02-22 NOTE — CONSULTS
[facility]  Cardiology  Consult Note    Patient Name: Tasha Hawley  MRN: [id number]  Admission Date: 2/21/2019  Hospital Length of Stay: 0 days  Code Status: Full Code   Attending Provider: Isaias Anderson MD   Consulting Provider: Segun Jaimes NP  Primary Care Physician: Mesfin Hodges Ii, MD  Principal Problem:Chest pain    Patient information was obtained from patient, past medical records and ER records.     Inpatient consult to [facility]  Consult performed by: Segun Jaimes NP  Consult ordered by: Edy Theodore NP        Subjective:     Chief Complaint:  Chest pain      HPI:   Tasha Hawley is a 63 y.o.female with hypothyroidism, coronary artery disease, thoracic aortic atherosclerosis, severe left atrial enlargement, diastolic dysfunction, gastroesophageal reflux disease status post Nissen fundoplication, right facial droop since birth, history of work-related back injury in the 1990s with scoliosis, lumbar degenerative disc disease, lumbar facet arthropathy, history of multiple fusions and spacers from L3-S1, status post spinal cord stimulator and intrathecal hydromorphone pump, chronic constipation, neurogenic bladder status post suprapubic catheter placement, recurrent  urinary tract infections, obstructive sleep apnea, lower extremity lymphedema, chronic insomnia, and depression.  She has difficulty ambulating at baseline, but able to ambulate with walker.  Patient presented to the ED with left chest pain x 3 days. Her pain has been constant since onset and is reproducible on palpation. Pain radiates to her left abdomen. She has never experienced this pain before and it is different from her myocardial pain experienced in the past.   Symptoms started at rest.  She denies alleviating factors. She reports associated palpitations, SOB and nausea/vomiting.   Troponin negative x 3  EKG SR without acute ischemic changes  CTA chest negative for  PE    Past Medical History:   Diagnosis Date    Anticoagulant long-term use     Anxiety     Arthritis     Bilateral lower extremity edema     severe chronic    Carotid artery occlusion     Cataract     Depression     Fever blister     Hypothyroid     Iron deficiency anemia     Lumbar radiculopathy     with chronic pain    Ocular migraine     Sleep apnea     cpap       Past Surgical History:   Procedure Laterality Date    ADENOIDECTOMY      BACK SURGERY      x 12    BIOPSY-BLADDER N/A 3/20/2018    Performed by [facility] OR 1ST FLR    BIOPSY-BLADDER N/A 1/26/2017    Performed by [facility] at [facility] OR [facility]ST FLR    CATARACT EXTRACTION W/  INTRAOCULAR LENS IMPLANT Left     Dr Coleman     CYSTOSCOPY N/A 3/20/2018    Performed by [facility] at [facility] OR [facility]ST FLR    CYSTOSCOPY N/A 1/26/2017    Performed by [facility] at [facility] OR 1ST FLR    CYSTOSCOPY N/A 11/8/2016    Performed by [facility] at [facility] OR 2ND FLR    DISCECTOMY, SPINE, CERVICAL, ANTERIOR APPROACH, WITH FUSION N/A 5/13/2013    Performed by Larry Martinez MD at [facility] OR 2ND FLR    ESOPHAGOGASTRODUODENOSCOPY (EGD) N/A 5/23/2018    Performed by Prince Vance MD at [facility] ENDO (4TH FLR)    ESOPHAGOGASTRODUODENOSCOPY (EGD) N/A 7/21/2017    Performed by Prince [facility] at [facility] ENDO (4TH FLR)    ESOPHAGOGASTRODUODENOSCOPY (EGD) w/ dilation N/A 8/28/2017    Performed by Prince [facility] at [facility] ENDO (4TH FLR)    FULGURATION-BLADDER N/A 1/26/2017    Performed by [facility] at [facility] OR 1ST FLR    HEART CATH-LEFT Left 1/7/2016    Performed by Alberto Gee [facility] at [facility] CATH LAB    HYSTERECTOMY  1975    endometriosis    INJECTION-BOTOX N/A 3/20/2018    Performed by [facility] at [facility] OR 1ST FLR    INJECTION-BOTOX N/A 1/26/2017    Performed by [facility] at [facility] OR 1ST FLR    INJECTION-BULKING AGENT URETHRAL  OUTLET N/A 3/20/2018    Performed by Shireen  NIEVES [facility] at [facility] OR 1ST FLR    INSERTION-INTRAOCULAR LENS (IOL) Left 11/13/2014    Performed by [facility]jana Coleman [facility] at [facility] OR 1ST FLR    INSERTION-SUPRAPUBIC TUBE-OPEN N/A 11/8/2016    Performed by [facility] at [facility] OR 2ND FLR    MANOMETRY-ESOPHAGEAL-WITH IMPEDANCE N/A 4/23/2018    Performed by Robert Menendez MD at [facility] ENDO (4TH FLR)    MYELOGRAM N/A 2/19/2013    Performed by [facility] Provider at [facility] OR 2ND FLR    pain pump placement      SQ Dilaudid Pump managed by Dr. Hillman, Pain Management    PHACOEMULSIFICATION-ASPIRATION-CATARACT Left 11/13/2014    Performed by [facility]jana Coleman [facility] at [facility] OR 1ST FLR    NC UPPER GI ENDOSCOPY,DIAGNOSIS [78675 (CPT®)] N/A 7/16/2014    Performed by Prince [facility]ance MD at [facility] ENDO (4TH FLR)    SPINAL CORD STIMULATOR REMOVAL      before Larissa    SPINE SURGERY  5-13-13    CERVICAL FUSION    TONSILLECTOMY         Review of patient's allergies indicates:   Allergen Reactions    (d)-limonene flavor      Other reaction(s): difficult intubation  Other reaction(s): Difficulty breathing    Bactrim [sulfamethoxazole-trimethoprim] Anaphylaxis    Benadryl [diphenhydramine hcl] Shortness Of Breath    Imitrex [sumatriptan succinate] Shortness Of Breath    Topamax [topiramate] Shortness Of Breath    Vancomycin Shortness Of Breath     Rash    Butorphanol tartrate     Darvocet a500 [propoxyphene n-acetaminophen]      Other reaction(s): Difficulty breathing    Fentanyl      Other reaction(s): Vomiting  Other reaction(s): Nausea  Other reaction(s): Itching  swelling    White petrolatum-zinc     Zinc oxide-white petrolatum      Other reaction(s): Difficulty breathing    Latex, natural rubber Itching and Rash    Phenytoin Rash and Other (See Comments)     Trouble breathing       No current facility-administered medications on file prior to encounter.      Current Outpatient Medications on File Prior to Encounter   Medication Sig    amoxicillin  (AMOXIL) 500 MG capsule Take 1 capsule (500 mg total) by mouth 3 (three) times daily. for 7 days    aspirin (ECOTRIN) 81 MG EC tablet Take 1 tablet (81 mg total) by mouth once daily.    atorvastatin (LIPITOR) 80 MG tablet TAKE 1 TABLET BY MOUTH DAILY    buPROPion (WELLBUTRIN XL) 300 MG 24 hr tablet Take 1 tablet (300 mg total) by mouth once daily.    butalbital-aspirin-caffeine -40 mg (FIORINAL) -40 mg Cap Take 1 capsule by mouth 3 (three) times daily as needed.    clopidogrel (PLAVIX) 75 mg tablet Take 75 mg by mouth once daily.    DOC-Q-LACE 100 mg capsule TAKE ONE CAPSULE BY MOUTH THREE TIMES DAILY AS NEEDED FOR CONSTIPATION    docosanol (ABREVA) 10 % Crea Apply 1 application topically 2 (two) times daily.    FLUoxetine 20 MG capsule Take 3 capsules (60 mg total) by mouth once daily.    furosemide (LASIX) 40 MG tablet Take 1 tablet (40 mg total) by mouth 2 (two) times daily.    hydrocodone-acetaminophen 10-325mg (NORCO)  mg Tab Take 2 tablets by mouth every 4 (four) hours as needed for Pain.     Lactobacillus acidophilus (PROBIOTIC) 10 billion cell Cap Take 1 capsule by mouth once daily.    levoFLOXacin (LEVAQUIN) 500 MG tablet Take 1 tablet (500 mg total) by mouth once daily. for 7 days    levothyroxine (SYNTHROID) 125 MCG tablet Take 1 tablet (125 mcg total) by mouth before breakfast.    lidocaine (LIDODERM) 5 % APPLY 1 PATCH DAILY AND WEAR FOR A MAXIMUM OF 12 HOURS    oxybutynin (DITROPAN XL) 15 MG TR24 Take 1 tablet (15 mg total) by mouth once daily.    pantoprazole (PROTONIX) 40 MG tablet Take 1 tablet (40 mg total) by mouth once daily.    potassium chloride SA (K-DUR,KLOR-CON) 20 MEQ tablet Take 20 mEq by mouth 2 (two) times daily.    potassium citrate (UROCIT-K) 10 mEq (1,080 mg) TbSR Take 1 tablet (10 mEq total) by mouth 3 (three) times daily with meals.    QUEtiapine (SEROQUEL) 100 MG Tab Take 1 tablet (100 mg total) by mouth every evening.    ranitidine (ZANTAC) 150  MG capsule Take 1 capsule (150 mg total) by mouth 2 (two) times daily.    SENNOSIDES (SENNA LAX ORAL) Take 20 mg by mouth every evening.     tiZANidine (ZANAFLEX) 4 MG tablet     traZODone (DESYREL) 100 MG tablet Take 2 tablets (200 mg total) by mouth every evening.    [DISCONTINUED] lamotrigine (LAMICTAL) 25 MG tablet Take 1 tablet (25 mg total) by mouth once daily.     Family History     Problem Relation (Age of Onset)    Cancer Mother (55), Father    Esophageal cancer Father    Heart disease Maternal Uncle    No Known Problems Brother, Brother, Sister, Maternal Aunt, Paternal Aunt, Paternal Uncle, Maternal Grandfather, Paternal Grandmother, Paternal Grandfather    Parkinsonism Maternal Grandmother    Tremor Maternal Grandmother        Tobacco Use    Smoking status: Never Smoker    Smokeless tobacco: Never Used   Substance and Sexual Activity    Alcohol use: Yes    Drug use: No    Sexual activity: No     Review of Systems   Constitution: Negative for diaphoresis, weakness and malaise/fatigue.   HENT: Negative.    Eyes: Negative.    Cardiovascular: Positive for chest pain and leg swelling. Negative for irregular heartbeat, near-syncope, orthopnea, palpitations, paroxysmal nocturnal dyspnea and syncope.   Respiratory: Negative for cough and shortness of breath.    Endocrine: Negative.    Hematologic/Lymphatic: Negative.    Skin: Negative.    Musculoskeletal: Positive for arthritis, back pain, joint pain and myalgias.   Gastrointestinal: Positive for abdominal pain and nausea.   Genitourinary: Positive for dysuria.   Neurological: Negative.    Psychiatric/Behavioral: Negative.    Allergic/Immunologic: Negative.      Objective:     Vital Signs (Most Recent):  Temp: 97.8 °F (36.6 °C) (02/22/19 1136)  Pulse: (!) 59 (02/22/19 1146)  Resp: 16 (02/22/19 1136)  BP: (!) 86/47 (02/22/19 1136)  SpO2: 98 % (02/22/19 1136) Vital Signs (24h Range):  Temp:  [97.8 °F (36.6 °C)-98.9 °F (37.2 °C)] 97.8 °F (36.6 °C)  Pulse:   [59-83] 59  Resp:  [16-18] 16  SpO2:  [94 %-98 %] 98 %  BP: ()/(43-57) 86/47     Weight: 69.9 kg (154 lb)  Body mass index is 26.43 kg/m².    SpO2: 98 %  O2 Device (Oxygen Therapy): room air      Intake/Output Summary (Last 24 hours) at 2/22/2019 1359  Last data filed at 2/21/2019 2240  Gross per 24 hour   Intake --   Output 1040 ml   Net -1040 ml       Lines/Drains/Airways     Drain                 Suprapubic Catheter 06/09/18 258 days         Suprapubic Catheter 12/28/18 0100 56 days          Peripheral Intravenous Line                 Peripheral IV - Single Lumen 02/21/19 2120 Right Wrist less than 1 day                Physical Exam   Constitutional: She is oriented to person, place, and time. She appears well-developed and well-nourished. No distress.   HENT:   Head: Normocephalic and atraumatic.   Eyes: Right eye exhibits no discharge. Left eye exhibits no discharge.   Neck: No JVD present.   Cardiovascular: Normal rate, regular rhythm and normal heart sounds. Exam reveals no gallop and no friction rub.   No murmur heard.  Pulmonary/Chest: Effort normal and breath sounds normal. She has no wheezes. She has no rales. She exhibits tenderness.   Abdominal: Soft. Bowel sounds are normal.   Musculoskeletal: She exhibits edema.   Neurological: She is alert and oriented to person, place, and time.   Skin: Skin is warm and dry. She is not diaphoretic.       Significant Labs:   BMP:   Recent Labs   Lab 02/21/19 2121         K 4.3      CO2 26   BUN 10   CREATININE 0.8   CALCIUM 9.4   , CMP   Recent Labs   Lab 02/21/19 2121      K 4.3      CO2 26      BUN 10   CREATININE 0.8   CALCIUM 9.4   PROT 6.9   ALBUMIN 3.1*   BILITOT 0.7   ALKPHOS 101   AST 17   ALT 17   ANIONGAP 8   ESTGFRAFRICA >60   EGFRNONAA >60   , CBC   Recent Labs   Lab 02/21/19 2121   WBC 9.11   HGB 11.9*   HCT 37.1      , INR No results for input(s): INR, PROTIME in the last 48 hours., Lipid Panel    Recent Labs   Lab 02/21/19 2121   CHOL 122   HDL 47   LDLCALC 63.8   TRIG 56   CHOLHDL 38.5   , Troponin   Recent Labs   Lab 02/21/19  2121 02/22/19  0500 02/22/19  1107   TROPONINI 0.007 <0.006 <0.006    and All pertinent lab results from the last 24 hours have been reviewed.    Significant Imaging: Echocardiogram:   2D echo with color flow doppler:   Results for orders placed or performed during the hospital encounter of 08/20/17   2D echo with color flow doppler   Result Value Ref Range    QEF 65 55 - 65    Diastolic Dysfunction Yes (A)     Pericardial Effusion NONE      Assessment and Plan:     * Chest pain    Atypical and 100% reproducible on palpation consistent with musculoskeletal pain  Pain constant x 3 D, normal trop and EKG       Coronary artery disease involving native coronary artery of native heart without angina pectoris    01/2016 [facility]  - Left Main Coronary Artery:             The LM is normal. There is BRENDEN 3 flow.       - Left Anterior Descending Artery:             The mid LAD has subtotal. There is BRENDEN 3 flow. There is collateral flow from the RCA (good). The remaining portion of the vessel is of small caliber.                     Lesion Details:  The length is 30mm. The reference vessel diameter is 1.5 millimeters. The distal LAD appears to be a small caliber vessel that does not quite reach the apex.       - Left Circumflex Artery:             The LCX is normal. There is BRENDEN 3 flow.       - Right Coronary Artery:             The RCA is normal. There is BRENDEN 3 flow.      D. SUMMARY/POST-OPERATIVE DIAGNOSIS:    1. Single vessel coronary artery disease.  2. Subtotal mid LAD but the remainder of the LAD appears to be of small caliber and not attractive for PCI.    Continue medical management  Chest pain is atypical and 100% reproducible on palpation   EKG NSR  Troponin negative   No further cardiac work up needed at this time          VTE Risk Mitigation (From admission, onward)        Ordered      IP VTE LOW RISK PATIENT  Once      02/21/19 2350     Place sequential compression device  Until discontinued      02/21/19 2350          Thank you for your consult. I will sign off. Please contact us if you have any additional questions.    Segun Jaimes NP  Cardiology   [facility]

## 2019-02-22 NOTE — ASSESSMENT & PLAN NOTE
Per the patient SBP is usually low 100's. We back through the clinic notes and it was noted that the patients b/p normally runs 80's-low 100's systolic.     -SBP range   -NS bolus given, NS at 100 ml/hr infusing  -patient has an  intrathecal pump in place with Dilaudid infusing (patient unable to tell dosing schedule) which is managed by pain management outpatient.  -will have the patient f/u with her primary care doctor

## 2019-02-22 NOTE — PLAN OF CARE
TN forwarded HH resumption orders, notes and AVS to [facility] via [facility] for pt's HH to resume    Discharge orders noted, no HH or HME ordered.    Future Appointments   Date Time Provider Department Center   2/28/2019  8:00 AM Erik Oconnor MD [facility]   3/7/2019  9:20 AM Mesfin Hodges II, MD [facility] IM [facility] PC[facility]       Pt's nurse will go over medications/signs and symptoms prior to discharge       02/22/19 7086   Final Note   Assessment Type Final Discharge Note   Anticipated Discharge Disposition Home   What phone number can be called within the next 1-3 days to see how you are doing after discharge? [phone]   Hospital Follow Up  Appt(s) scheduled? Yes   Right Care Referral Info   Post Acute Recommendation No Care     Adriana Oneill, RN Transitional Navigator  [phone]

## 2019-02-22 NOTE — ED TRIAGE NOTES
Patient presents to the ED with reports of having left sided chest pain x 5 days and shortness of breath that started today. Patient denies any cough, congestion, nausea, vomiting, diarrhea, fever, or chills. States having a scheduled appointment with her PCP tomorrow morning but was feeling worse this evening.     Review of patient's allergies indicates:   Allergen Reactions    (d)-limonene flavor      Other reaction(s): difficult intubation  Other reaction(s): Difficulty breathing    Bactrim [sulfamethoxazole-trimethoprim] Anaphylaxis    Benadryl [diphenhydramine hcl] Shortness Of Breath    Imitrex [sumatriptan succinate] Shortness Of Breath    Topamax [topiramate] Shortness Of Breath    Vancomycin Shortness Of Breath     Rash    Butorphanol tartrate     Darvocet a500 [propoxyphene n-acetaminophen]      Other reaction(s): Difficulty breathing    Fentanyl      Other reaction(s): Vomiting  Other reaction(s): Nausea  Other reaction(s): Itching  swelling    White petrolatum-zinc     Zinc oxide-white petrolatum      Other reaction(s): Difficulty breathing    Latex, natural rubber Itching and Rash    Phenytoin Rash and Other (See Comments)     Trouble breathing        Patient has verified the spelling of their name and  on armband.   APPEARANCE: Patient is alert, calm, oriented x 4, and does not appear distressed.  SKIN: Skin is normal for race, warm, and dry. Normal skin turgor and mucous membranes moist.  CARDIAC: Normal rate and rhythm, no murmur heard. +Chest pain.   RESPIRATORY:Normal rate and effort. Breath sounds clear bilaterally throughout chest. Respirations are equal and unlabored. +Shortness of breath.     GASTRO: Bowel sounds normal, abdomen is soft, no tenderness, and no abdominal distention.  MUSCLE: Full ROM. No bony tenderness or soft tissue tenderness. No obvious deformity.  PERIPHERAL VASCULAR: peripheral pulses present. Normal cap refill. +Edema to bilateral lower extremities. Warm to  touch.  MENTAL STATUS: awake, alert and aware of environment.  ENT: EARS: no obvious drainage. NOSE: no active bleeding. THROAT: no redness or swelling.

## 2019-02-22 NOTE — ASSESSMENT & PLAN NOTE
Recurrent UTI (Proteus Mirabilis)   Chronic suprapubic cath     Patient followed by Dr. Mika HASSAN outpatient. Recently started on amoxicillin and Levaquin for  Management of UTI-continue

## 2019-02-22 NOTE — SUBJECTIVE & OBJECTIVE
Past Medical History:   Diagnosis Date    Anticoagulant long-term use     Anxiety     Arthritis     Bilateral lower extremity edema     severe chronic    Carotid artery occlusion     Cataract     Depression     Fever blister     Hypothyroid     Iron deficiency anemia     Lumbar radiculopathy     with chronic pain    Ocular migraine     Sleep apnea     cpap       Past Surgical History:   Procedure Laterality Date    ADENOIDECTOMY      BACK SURGERY      x 12    BIOPSY-BLADDER N/A 3/20/2018    Performed by Shireen Mayo MD at [facility] OR 1ST FLR    BIOPSY-BLADDER N/A 1/26/2017    Performed by Shireen Mayo MD at [facility] OR 1ST FLR    CATARACT EXTRACTION W/  INTRAOCULAR LENS IMPLANT Left     Dr Coleman     CYSTOSCOPY N/A 3/20/2018    Performed by Shireen Mayo MD at [facility] OR 1ST FLR    CYSTOSCOPY N/A 1/26/2017    Performed by Shireen Mayo MD at [facility] OR 1ST FLR    CYSTOSCOPY N/A 11/8/2016    Performed by Shireen Mayo MD at [facility] OR 2ND FLR    DISCECTOMY, SPINE, CERVICAL, ANTERIOR APPROACH, WITH FUSION N/A 5/13/2013    Performed by Larry Martinez MD at [facility] OR 2ND FLR    ESOPHAGOGASTRODUODENOSCOPY (EGD) N/A 5/23/2018    Performed by Prince Vance MD at [facility] ENDO (4TH FLR)    ESOPHAGOGASTRODUODENOSCOPY (EGD) N/A 7/21/2017    Performed by Prince Vance MD at [facility] ENDO (4TH FLR)    ESOPHAGOGASTRODUODENOSCOPY (EGD) w/ dilation N/A 8/28/2017    Performed by Prince Vance MD at [facility] ENDO (4TH FLR)    FULGURATION-BLADDER N/A 1/26/2017    Performed by Shireen Mayo MD at [facility] OR 1ST FLR    HEART CATH-LEFT Left 1/7/2016    Performed by Alberto Gee MD at [facility] CATH LAB    HYSTERECTOMY  1975    endometriosis    INJECTION-BOTOX N/A 3/20/2018    Performed by Shireen Mayo MD at [facility] OR 1ST FLR    INJECTION-BOTOX N/A 1/26/2017    Performed by Shireen Mayo MD at [facility] OR 1ST FLR    INJECTION-BULKING AGENT URETHRAL  OUTLET N/A 3/20/2018    Performed by Shireen PICKETT  MD Maria Esther at [facility] OR 1ST FLR    INSERTION-INTRAOCULAR LENS (IOL) Left 11/13/2014    Performed by Sanjana Coleman MD at [facility] OR 1ST FLR    INSERTION-SUPRAPUBIC TUBE-OPEN N/A 11/8/2016    Performed by Shireen Mayo MD at [facility] OR 2ND FLR    MANOMETRY-ESOPHAGEAL-WITH IMPEDANCE N/A 4/23/2018    Performed by Robert Menendez MD at [facility] ENDO (4TH FLR)    MYELOGRAM N/A 2/19/2013    Performed by [facility] Provider at [facility] OR 2ND FLR    pain pump placement      SQ Dilaudid Pump managed by Dr. Hillman, Pain Management    PHACOEMULSIFICATION-ASPIRATION-CATARACT Left 11/13/2014    Performed by Sanjana Coleman MD at [facility] OR 1ST FLR    NE UPPER GI ENDOSCOPY,DIAGNOSIS [16350 (CPT®)] N/A 7/16/2014    Performed by Prince Vance MD at [facility] ENDO (4TH FLR)    SPINAL CORD STIMULATOR REMOVAL      before Larissa    SPINE SURGERY  5-13-13    CERVICAL FUSION    TONSILLECTOMY         Review of patient's allergies indicates:   Allergen Reactions    (d)-limonene flavor      Other reaction(s): difficult intubation  Other reaction(s): Difficulty breathing    Bactrim [sulfamethoxazole-trimethoprim] Anaphylaxis    Benadryl [diphenhydramine hcl] Shortness Of Breath    Imitrex [sumatriptan succinate] Shortness Of Breath    Topamax [topiramate] Shortness Of Breath    Vancomycin Shortness Of Breath     Rash    Butorphanol tartrate     Darvocet a500 [propoxyphene n-acetaminophen]      Other reaction(s): Difficulty breathing    Fentanyl      Other reaction(s): Vomiting  Other reaction(s): Nausea  Other reaction(s): Itching  swelling    White petrolatum-zinc     Zinc oxide-white petrolatum      Other reaction(s): Difficulty breathing    Latex, natural rubber Itching and Rash    Phenytoin Rash and Other (See Comments)     Trouble breathing       No current facility-administered medications on file prior to encounter.      Current Outpatient Medications on File Prior to Encounter   Medication Sig    amoxicillin  (AMOXIL) 500 MG capsule Take 1 capsule (500 mg total) by mouth 3 (three) times daily. for 7 days    aspirin (ECOTRIN) 81 MG EC tablet Take 1 tablet (81 mg total) by mouth once daily.    atorvastatin (LIPITOR) 80 MG tablet TAKE 1 TABLET BY MOUTH DAILY    buPROPion (WELLBUTRIN XL) 300 MG 24 hr tablet Take 1 tablet (300 mg total) by mouth once daily.    butalbital-aspirin-caffeine -40 mg (FIORINAL) -40 mg Cap Take 1 capsule by mouth 3 (three) times daily as needed.    clopidogrel (PLAVIX) 75 mg tablet Take 75 mg by mouth once daily.    DOC-Q-LACE 100 mg capsule TAKE ONE CAPSULE BY MOUTH THREE TIMES DAILY AS NEEDED FOR CONSTIPATION    docosanol (ABREVA) 10 % Crea Apply 1 application topically 2 (two) times daily.    FLUoxetine 20 MG capsule Take 3 capsules (60 mg total) by mouth once daily.    furosemide (LASIX) 40 MG tablet Take 1 tablet (40 mg total) by mouth 2 (two) times daily.    hydrocodone-acetaminophen 10-325mg (NORCO)  mg Tab Take 2 tablets by mouth every 4 (four) hours as needed for Pain.     Lactobacillus acidophilus (PROBIOTIC) 10 billion cell Cap Take 1 capsule by mouth once daily.    levoFLOXacin (LEVAQUIN) 500 MG tablet Take 1 tablet (500 mg total) by mouth once daily. for 7 days    levothyroxine (SYNTHROID) 125 MCG tablet Take 1 tablet (125 mcg total) by mouth before breakfast.    lidocaine (LIDODERM) 5 % APPLY 1 PATCH DAILY AND WEAR FOR A MAXIMUM OF 12 HOURS    oxybutynin (DITROPAN XL) 15 MG TR24 Take 1 tablet (15 mg total) by mouth once daily.    pantoprazole (PROTONIX) 40 MG tablet Take 1 tablet (40 mg total) by mouth once daily.    potassium chloride SA (K-DUR,KLOR-CON) 20 MEQ tablet Take 20 mEq by mouth 2 (two) times daily.    potassium citrate (UROCIT-K) 10 mEq (1,080 mg) TbSR Take 1 tablet (10 mEq total) by mouth 3 (three) times daily with meals.    QUEtiapine (SEROQUEL) 100 MG Tab Take 1 tablet (100 mg total) by mouth every evening.    ranitidine (ZANTAC) 150  MG capsule Take 1 capsule (150 mg total) by mouth 2 (two) times daily.    SENNOSIDES (SENNA LAX ORAL) Take 20 mg by mouth every evening.     tiZANidine (ZANAFLEX) 4 MG tablet     traZODone (DESYREL) 100 MG tablet Take 2 tablets (200 mg total) by mouth every evening.    [DISCONTINUED] lamotrigine (LAMICTAL) 25 MG tablet Take 1 tablet (25 mg total) by mouth once daily.     Family History     Problem Relation (Age of Onset)    Cancer Mother (55), Father    Esophageal cancer Father    Heart disease Maternal Uncle    No Known Problems Brother, Brother, Sister, Maternal Aunt, Paternal Aunt, Paternal Uncle, Maternal Grandfather, Paternal Grandmother, Paternal Grandfather    Parkinsonism Maternal Grandmother    Tremor Maternal Grandmother        Tobacco Use    Smoking status: Never Smoker    Smokeless tobacco: Never Used   Substance and Sexual Activity    Alcohol use: Yes    Drug use: No    Sexual activity: No     Review of Systems   Constitutional: Negative for chills, diaphoresis and fever.   HENT: Negative for congestion.    Eyes: Negative for photophobia.   Respiratory: Positive for shortness of breath. Negative for cough, chest tightness and wheezing.    Cardiovascular: Positive for chest pain and palpitations. Negative for leg swelling.   Gastrointestinal: Positive for nausea and vomiting. Negative for abdominal pain and diarrhea.   Genitourinary: Negative for dysuria, flank pain, frequency and hematuria.   Musculoskeletal: Negative for back pain and myalgias.   Skin: Negative for color change.   Neurological: Negative for dizziness, syncope, light-headedness and headaches.   Psychiatric/Behavioral: Negative for confusion.     Objective:     Vital Signs (Most Recent):  Temp: 98.6 °F (37 °C) (02/22/19 0034)  Pulse: 65 (02/22/19 0405)  Resp: 18 (02/22/19 0034)  BP: (!) 99/52 (02/22/19 0034)  SpO2: (!) 94 % (02/22/19 0405) Vital Signs (24h Range):  Temp:  [98.6 °F (37 °C)-98.9 °F (37.2 °C)] 98.6 °F (37  °C)  Pulse:  [65-83] 65  Resp:  [16-18] 18  SpO2:  [94 %-98 %] 94 %  BP: ()/(50-57) 99/52     Weight: 69.9 kg (154 lb)  Body mass index is 26.43 kg/m².    Physical Exam   Constitutional: She is oriented to person, place, and time. She appears well-developed and well-nourished.   Appears chronically ill    HENT:   Head: Normocephalic and atraumatic.   Eyes: Conjunctivae are normal. Pupils are equal, round, and reactive to light.   Neck: Normal range of motion. No JVD present.   Cardiovascular: Normal rate, regular rhythm, normal heart sounds and intact distal pulses.   Pulmonary/Chest: Effort normal. No respiratory distress. She has no wheezes.   Abdominal: Soft. Bowel sounds are normal. She exhibits no distension. There is no tenderness. There is no guarding.   Musculoskeletal: Normal range of motion. She exhibits no edema or tenderness.   Neurological: She is alert and oriented to person, place, and time.   Skin: Skin is warm and dry. Capillary refill takes less than 2 seconds.   Psychiatric: She has a normal mood and affect. Her behavior is normal.         CRANIAL NERVES     CN III, IV, VI   Pupils are equal, round, and reactive to light.       Significant Labs:   BMP:   Recent Labs   Lab 02/21/19 2121         K 4.3      CO2 26   BUN 10   CREATININE 0.8   CALCIUM 9.4     CBC:   Recent Labs   Lab 02/21/19 2121   WBC 9.11   HGB 11.9*   HCT 37.1        TSH:   Recent Labs   Lab 02/21/19  2351   TSH 0.744     Urine Studies:   Recent Labs   Lab 02/22/19  0046   COLORU Straw   APPEARANCEUA Clear   PHUR 6.0   SPECGRAV <=1.005*   PROTEINUA Negative   GLUCUA Negative   KETONESU Negative   BILIRUBINUA Negative   OCCULTUA Trace*   NITRITE Positive*   UROBILINOGEN Negative   LEUKOCYTESUR 1+*   RBCUA 1   WBCUA 12*   BACTERIA Occasional   SQUAMEPITHEL 0       Significant Imaging: I have reviewed all pertinent imaging results/findings within the past 24 hours.

## 2019-02-22 NOTE — ASSESSMENT & PLAN NOTE
Degenerative disc disease, lumbar  Osteoarthritis of spine with radiculopathy, lumbar region  S/P insertion of intrathecal pump  Narcotic dependency, continuous  Chronic.  -followed by pain management outpatient   -cont all meds as directed

## 2019-02-22 NOTE — ASSESSMENT & PLAN NOTE
Atypical and 100% reproducible on palpation consistent with musculoskeletal pain  Pain constant x 3 D, normal trop and EKG

## 2019-02-23 ENCOUNTER — HOSPITAL ENCOUNTER (EMERGENCY)
Facility: HOSPITAL | Age: 64
Discharge: HOME | End: 2019-02-24
Attending: EMERGENCY MEDICINE
Payer: MEDICARE

## 2019-02-23 DIAGNOSIS — R07.9 CHEST PAIN: ICD-10-CM

## 2019-02-23 LAB
ALBUMIN SERPL BCP-MCNC: 2.9 G/DL
ALP SERPL-CCNC: 106 U/L
ALT SERPL W/O P-5'-P-CCNC: 19 U/L
ANION GAP SERPL CALC-SCNC: 10 MMOL/L
AST SERPL-CCNC: 24 U/L
BILIRUB SERPL-MCNC: 0.7 MG/DL
BNP SERPL-MCNC: 171 PG/ML
BUN SERPL-MCNC: 6 MG/DL
CALCIUM SERPL-MCNC: 9.5 MG/DL
CHLORIDE SERPL-SCNC: 102 MMOL/L
CO2 SERPL-SCNC: 24 MMOL/L
CREAT SERPL-MCNC: 0.7 MG/DL
EST. GFR  (AFRICAN AMERICAN): >60 ML/MIN/1.73 M^2
EST. GFR  (NON AFRICAN AMERICAN): >60 ML/MIN/1.73 M^2
GLUCOSE SERPL-MCNC: 152 MG/DL
LIPASE SERPL-CCNC: 19 U/L
POTASSIUM SERPL-SCNC: 4 MMOL/L
PROT SERPL-MCNC: 7.2 G/DL
SODIUM SERPL-SCNC: 136 MMOL/L
TROPONIN I SERPL DL<=0.01 NG/ML-MCNC: <0.006 NG/ML

## 2019-02-23 PROCEDURE — 83880 ASSAY OF NATRIURETIC PEPTIDE: CPT | Mod: HCNC

## 2019-02-23 PROCEDURE — 85025 COMPLETE CBC W/AUTO DIFF WBC: CPT | Mod: HCNC

## 2019-02-23 PROCEDURE — 83690 ASSAY OF LIPASE: CPT | Mod: HCNC

## 2019-02-23 PROCEDURE — 25000003 PHARM REV CODE 250: Mod: HCNC | Performed by: EMERGENCY MEDICINE

## 2019-02-23 PROCEDURE — 80053 COMPREHEN METABOLIC PANEL: CPT | Mod: HCNC

## 2019-02-23 PROCEDURE — 84484 ASSAY OF TROPONIN QUANT: CPT | Mod: HCNC

## 2019-02-23 PROCEDURE — 93005 ELECTROCARDIOGRAM TRACING: CPT | Mod: HCNC

## 2019-02-23 PROCEDURE — 99285 EMERGENCY DEPT VISIT HI MDM: CPT | Mod: HCNC

## 2019-02-23 RX ORDER — ASPIRIN 325 MG
325 TABLET ORAL
Status: COMPLETED | OUTPATIENT
Start: 2019-02-23 | End: 2019-02-23

## 2019-02-23 RX ORDER — NITROGLYCERIN 0.4 MG/1
0.4 TABLET SUBLINGUAL EVERY 5 MIN PRN
Status: DISCONTINUED | OUTPATIENT
Start: 2019-02-23 | End: 2019-02-24 | Stop reason: HOSPADM

## 2019-02-23 RX ADMIN — ASPIRIN 325 MG ORAL TABLET 325 MG: 325 PILL ORAL at 11:02

## 2019-02-23 NOTE — PLAN OF CARE
[facility] HOME HEALTH ORDERS  FACE TO FACE ENCOUNTER    Patient Name: Tasha Hawley  YOB: 1955    PCP: Mesfin Hodges Ii, MD   PCP Address: [address] / [place] [address]  PCP Phone Number: [phone]  PCP Fax: [phone]    Encounter Date: 02/22/2019    Admit to Home Health    Diagnoses:  Active Hospital Problems    Diagnosis  POA    *Costochondritis [M94.0]  Yes    Dyspnea [R06.00]  Yes    Hypotension [I95.9]  Yes    Recurrent UTI (urinary tract infection) [N39.0]  Yes    Ischemic cardiomyopathy [I25.5]  Yes     Chronic    Suprapubic catheter [Z93.59]  Not Applicable     Chronic    Insomnia due to medical condition [G47.01]  Yes     Chronic    S/P insertion of intrathecal pump [Z98.890]  Not Applicable     Chronic    Degenerative disc disease, lumbar [M51.36]  Yes     Chronic    Osteoarthritis of spine with radiculopathy, lumbar region [M47.26]  Yes     Chronic    Lymphedema of both lower extremities [I89.0]  Yes     Chronic    Primary hypothyroidism [E03.9]  Yes     Chronic    Urinary retention [R33.9]  Yes     Chronic    Neurogenic bladder [N31.9]  Yes     Chronic    GERD (gastroesophageal reflux disease) [K21.9]  Yes     Chronic    Coronary artery disease involving native coronary artery of native heart without angina pectoris [I25.10]  Yes     Chronic    Narcotic dependency, continuous [F11.20]  Yes     Chronic    Chronic pain syndrome [G89.4]  Yes     Chronic    Generalized anxiety disorder [F41.1]  Yes    Iron deficiency anemia [D50.9]  Yes      Resolved Hospital Problems    Diagnosis Date Resolved POA    Chest pain [R07.9] 02/22/2019 Yes       Future Appointments   Date Time Provider Department Center   2/28/2019  8:00 AM Erik Oconnor MD [facility]   3/7/2019  9:20 AM Mesfin Hodges II, MD [facility]     Follow-up Information     Mesfin Hodges Ii, MD On 3/7/2019.    Specialty:  Internal  Medicine  Why:  9:20 am  Contact information:  [address]  [facility] [id number]  [phone]                     I have seen and examined this patient face to face today. My clinical findings that support the need for the home health skilled services and home bound status are the following:  Weakness/numbness causing balance and gait disturbance due to Weakness/Debility making it taxing to leave home.    Allergies:  Review of patient's allergies indicates:   Allergen Reactions    (d)-limonene flavor      Other reaction(s): difficult intubation  Other reaction(s): Difficulty breathing    Bactrim [sulfamethoxazole-trimethoprim] Anaphylaxis    Benadryl [diphenhydramine hcl] Shortness Of Breath    Imitrex [sumatriptan succinate] Shortness Of Breath    Topamax [topiramate] Shortness Of Breath    Vancomycin Shortness Of Breath     Rash    Butorphanol tartrate     Darvocet a500 [propoxyphene n-acetaminophen]      Other reaction(s): Difficulty breathing    Fentanyl      Other reaction(s): Vomiting  Other reaction(s): Nausea  Other reaction(s): Itching  swelling    White petrolatum-zinc     Zinc oxide-white petrolatum      Other reaction(s): Difficulty breathing    Latex, natural rubber Itching and Rash    Phenytoin Rash and Other (See Comments)     Trouble breathing       Diet: cardiac diet    Activities: activity as tolerated    Nursing:   SN to complete comprehensive assessment including routine vital signs. Instruct on disease process and s/s of complications to report to MD. Review/verify medication list sent home with the patient at time of discharge  and instruct patient/caregiver as needed. Frequency may be adjusted depending on start of care date.    Notify MD if SBP > 160 or < 90; DBP > 90 or < 50; HR > 120 or < 50; Temp > 101.      MISCELLANEOUS CARE:  Motley Care: Instruct patient/caregiver to empty Motley bag.  Change Motley every month. Keep dressing clean and dry.         Medications: Review  discharge medications with patient and family and provide education.      Current Discharge Medication List      CONTINUE these medications which have CHANGED    Details   amoxicillin (AMOXIL) 500 MG capsule Take 1 capsule (500 mg total) by mouth 3 (three) times daily. for 7 days  Qty: 21 capsule, Refills: 0    Associated Diagnoses: Acute cystitis without hematuria      levoFLOXacin (LEVAQUIN) 500 MG tablet Take 1 tablet (500 mg total) by mouth once daily. for 7 days  Qty: 7 tablet, Refills: 0    Associated Diagnoses: Acute cystitis without hematuria         CONTINUE these medications which have NOT CHANGED    Details   aspirin (ECOTRIN) 81 MG EC tablet Take 1 tablet (81 mg total) by mouth once daily.  Refills: 0      atorvastatin (LIPITOR) 80 MG tablet TAKE 1 TABLET BY MOUTH DAILY  Qty: 90 tablet, Refills: 3      buPROPion (WELLBUTRIN XL) 300 MG 24 hr tablet Take 1 tablet (300 mg total) by mouth once daily.  Qty: 30 tablet, Refills: 2      butalbital-aspirin-caffeine -40 mg (FIORINAL) -40 mg Cap Take 1 capsule by mouth 3 (three) times daily as needed.      clopidogrel (PLAVIX) 75 mg tablet Take 75 mg by mouth once daily.      DOC-Q-LACE 100 mg capsule TAKE ONE CAPSULE BY MOUTH THREE TIMES DAILY AS NEEDED FOR CONSTIPATION  Qty: 180 capsule, Refills: 3    Associated Diagnoses: Constipation, unspecified constipation type      docosanol (ABREVA) 10 % Crea Apply 1 application topically 2 (two) times daily.  Qty: 2 g, Refills: 0      FLUoxetine 20 MG capsule Take 3 capsules (60 mg total) by mouth once daily.  Qty: 90 capsule, Refills: 2      furosemide (LASIX) 40 MG tablet Take 1 tablet (40 mg total) by mouth 2 (two) times daily.  Qty: 60 tablet, Refills: 11      hydrocodone-acetaminophen 10-325mg (NORCO)  mg Tab Take 2 tablets by mouth every 4 (four) hours as needed for Pain.       Lactobacillus acidophilus (PROBIOTIC) 10 billion cell Cap Take 1 capsule by mouth once daily.    Associated Diagnoses:  Prophylactic measure      levothyroxine (SYNTHROID) 125 MCG tablet Take 1 tablet (125 mcg total) by mouth before breakfast.  Qty: 90 tablet, Refills: 3    Associated Diagnoses: Primary hypothyroidism      lidocaine (LIDODERM) 5 % APPLY 1 PATCH DAILY AND WEAR FOR A MAXIMUM OF 12 HOURS  Refills: 5      oxybutynin (DITROPAN XL) 15 MG TR24 Take 1 tablet (15 mg total) by mouth once daily.  Qty: 30 tablet, Refills: 11    Associated Diagnoses: Detrusor instability      pantoprazole (PROTONIX) 40 MG tablet Take 1 tablet (40 mg total) by mouth once daily.  Qty: 90 tablet, Refills: 3    Associated Diagnoses: Esophageal dysphagia      potassium chloride SA (K-DUR,KLOR-CON) 20 MEQ tablet Take 20 mEq by mouth 2 (two) times daily.      potassium citrate (UROCIT-K) 10 mEq (1,080 mg) TbSR Take 1 tablet (10 mEq total) by mouth 3 (three) times daily with meals.  Qty: 90 tablet, Refills: 11    Associated Diagnoses: Recurrent UTI      QUEtiapine (SEROQUEL) 100 MG Tab Take 1 tablet (100 mg total) by mouth every evening.  Qty: 30 tablet, Refills: 3      ranitidine (ZANTAC) 150 MG capsule Take 1 capsule (150 mg total) by mouth 2 (two) times daily.  Qty: 180 capsule, Refills: 3    Associated Diagnoses: Gastroesophageal reflux disease, esophagitis presence not specified      SENNOSIDES (SENNA LAX ORAL) Take 20 mg by mouth every evening.       tiZANidine (ZANAFLEX) 4 MG tablet       traZODone (DESYREL) 100 MG tablet Take 2 tablets (200 mg total) by mouth every evening.  Qty: 60 tablet, Refills: 2    Associated Diagnoses: Insomnia, unspecified type         STOP taking these medications       lamotrigine (LAMICTAL) 25 MG tablet Comments:   Reason for Stopping:               I certify that this patient is confined to her home.

## 2019-02-23 NOTE — PLAN OF CARE
Problem: Adult Inpatient Plan of Care  Goal: Plan of Care Review  Outcome: Outcome(s) achieved Date Met: 02/22/19  Patient safety maintained throughout this shift, patient is being discharged to home, IV out, tele box off, [facility] delivered antibiotics at bedside, pt verbalizes understanding of discharge instruction, follow up visits and medication, pt in stable condition, will continue to monitor

## 2019-02-24 VITALS
HEART RATE: 80 BPM | OXYGEN SATURATION: 93 % | RESPIRATION RATE: 17 BRPM | TEMPERATURE: 99 F | SYSTOLIC BLOOD PRESSURE: 110 MMHG | DIASTOLIC BLOOD PRESSURE: 57 MMHG

## 2019-02-24 LAB
BASOPHILS # BLD AUTO: 0 K/UL
BASOPHILS NFR BLD: 0 %
DIFFERENTIAL METHOD: ABNORMAL
EOSINOPHIL # BLD AUTO: 0 K/UL
EOSINOPHIL NFR BLD: 0.1 %
ERYTHROCYTE [DISTWIDTH] IN BLOOD BY AUTOMATED COUNT: 13.1 %
HCT VFR BLD AUTO: 38.2 %
HGB BLD-MCNC: 12.2 G/DL
LYMPHOCYTES # BLD AUTO: 0.7 K/UL
LYMPHOCYTES NFR BLD: 7.2 %
MCH RBC QN AUTO: 27.7 PG
MCHC RBC AUTO-ENTMCNC: 31.9 G/DL
MCV RBC AUTO: 87 FL
MONOCYTES # BLD AUTO: 1.2 K/UL
MONOCYTES NFR BLD: 12.4 %
NEUTROPHILS # BLD AUTO: 7.5 K/UL
NEUTROPHILS NFR BLD: 80.2 %
PLATELET # BLD AUTO: 299 K/UL
PMV BLD AUTO: 9.4 FL
RBC # BLD AUTO: 4.4 M/UL
TROPONIN I SERPL DL<=0.01 NG/ML-MCNC: <0.006 NG/ML
WBC # BLD AUTO: 9.34 K/UL

## 2019-02-24 PROCEDURE — 84484 ASSAY OF TROPONIN QUANT: CPT | Mod: HCNC

## 2019-02-24 PROCEDURE — 25000003 PHARM REV CODE 250: Mod: HCNC | Performed by: EMERGENCY MEDICINE

## 2019-02-24 RX ADMIN — LIDOCAINE HYDROCHLORIDE: 20 SOLUTION ORAL; TOPICAL at 01:02

## 2019-02-24 NOTE — ED NOTES
APPEARANCE: Alert, oriented, no acute distress noted  CARDIAC: Normal sinus rhythm noted on CR monitor, HR and BP WNL. C/o midsternal CP x1 week  PERIPHERAL VASCULAR: peripheral pulses +2 and present x4 extremities. Cap refill <3 seconds. +2 edema noted to RLE and right hand.  RESPIRATORY:Normal rate and effort, breath sounds clear bilaterally throughout chest. Respirations are equal and unlabored no obvious signs of distress.  GASTRO: soft, bowel sounds normal, no tenderness, no abdominal distention. Pt c/o pain on palpation to RUQ and RLQ below pain pump.  G/U: chronic suprapubic cath in place, site without compromise, no s/s of infection  MUSC: Full ROM x4 extremities. No obvious deformity.  SKIN: Skin is warm and dry, normal skin turgor, mucous membranes moist.  NEURO:  GCS 15, generalized weakness  MENTAL STATUS: AAOx3, calm, cooperative, behavior appropriate to situation  EYE: PERRLA. Bilateral pupil equal, reaction brisk, normal size.   ENT: trachea midline, no problems identified

## 2019-02-24 NOTE — ED NOTES
Pt sleeping on stretcher, easily arousable. Dr. Gill at bedside, updating pt on test results, discharge plan, and follow up care.

## 2019-02-24 NOTE — ED PROVIDER NOTES
"Encounter Date: 2/23/2019    SCRIBE #1 NOTE: I, Melba Fox, am scribing for, and in the presence of,  Dr. Gill. I have scribed the entire note.       History     Chief Complaint   Patient presents with    Chest Pain     reports was seen and admitted to this facility for same pain. d/c following tests which were negative. was seen by home health nurse who consulted PCP, advised pt come back for gall bladder evaluation.     Abdominal Pain     Time seen by provider: 10:45 PM    This is a 62 y/o female with PMHx of hypothyroidism, coronary artery disease with history of acute coronary syndrome in January 2016 and ischemic cardiomyopathy (ejection fraction since improved), thoracic aortic atherosclerosis, severe left atrial enlargement, diastolic dysfunction, gastroesophageal reflux disease status post Nissen fundoplication, right facial droop since birth, history of work-related back injury in the 1990s with scoliosis, lumbar degenerative disc disease, lumbar facet arthropathy, history of multiple fusions and spacers from L3-S1, status post spinal cord stimulator and intrathecal hydromorphone pump, chronic constipation, neurogenic bladder status post suprapubic catheter placement, recurrent  urinary tract infections, obstructive sleep apnea, lower extremity lymphedema, chronic insomnia, and depression who presents to ED with complaint of constant midsternal chest pain for the last couple of days. The patient describes her pain as a "dump truck sitting on my chest" that would radiate to the left right side of her chest. She reports of associating nausea and diaphoresis that began today. The patient took 1/2 Xanax with no relief. The patient was seen in ED 2 days ago and was admitted for the same complaint. Per , the patient was discharged yesterday with negative results. Chart review reveals that patient had 3 negative sets of troponins, no ischemic changes on EKG, CTA chest that was negative for PE and seen " by cardiology who did not recommend any further cardiac workup.  The  reports the patient began having her chest pain again last night. He reports the patient did not have anything to eat or drink today. The patient also reports of abdominal pain for the last couple of days.        The history is provided by the patient and the spouse.     Review of patient's allergies indicates:   Allergen Reactions    (d)-limonene flavor      Other reaction(s): difficult intubation  Other reaction(s): Difficulty breathing    Bactrim [sulfamethoxazole-trimethoprim] Anaphylaxis    Benadryl [diphenhydramine hcl] Shortness Of Breath    Imitrex [sumatriptan succinate] Shortness Of Breath    Topamax [topiramate] Shortness Of Breath    Vancomycin Shortness Of Breath     Rash    Butorphanol tartrate     Darvocet a500 [propoxyphene n-acetaminophen]      Other reaction(s): Difficulty breathing    Fentanyl      Other reaction(s): Vomiting  Other reaction(s): Nausea  Other reaction(s): Itching  swelling    White petrolatum-zinc     Zinc oxide-white petrolatum      Other reaction(s): Difficulty breathing    Latex, natural rubber Itching and Rash    Phenytoin Rash and Other (See Comments)     Trouble breathing     Past Medical History:   Diagnosis Date    Anticoagulant long-term use     Anxiety     Arthritis     Bilateral lower extremity edema     severe chronic    Carotid artery occlusion     Cataract     Depression     Fever blister     Hypothyroid     Iron deficiency anemia     Lumbar radiculopathy     with chronic pain    Ocular migraine     Sleep apnea     cpap     Past Surgical History:   Procedure Laterality Date    ADENOIDECTOMY      BACK SURGERY      x 12    BIOPSY-BLADDER N/A 3/20/2018    Performed by [facility] at [facility] OR 1ST FLR    BIOPSY-BLADDER N/A 1/26/2017    Performed by [facility] at [facility] OR 1ST FLR    CATARACT EXTRACTION W/  INTRAOCULAR LENS IMPLANT Left     Dr Coleman      CYSTOSCOPY N/A 3/20/2018    Performed by [facility] at [facility] OR 1ST FLR    CYSTOSCOPY N/A 1/26/2017    Performed by [facility] at [facility] OR 1ST FLR    CYSTOSCOPY N/A 11/8/2016    Performed by [facility] at [facility] OR 2ND FLR    DISCECTOMY, SPINE, CERVICAL, ANTERIOR APPROACH, WITH FUSION N/A 5/13/2013    Performed by Larry Martinez MD at [facility] OR 2ND FLR    ESOPHAGOGASTRODUODENOSCOPY (EGD) N/A 5/23/2018    Performed by Prince Vance MD at [facility] ENDO (4TH FLR)    ESOPHAGOGASTRODUODENOSCOPY (EGD) N/A 7/21/2017    Performed by Prince Vance MD at [facility] ENDO (4TH FLR)    ESOPHAGOGASTRODUODENOSCOPY (EGD) w/ dilation N/A 8/28/2017    Performed by Prince Vance MD at [facility] ENDO (4TH FLR)    FULGURATION-BLADDER N/A 1/26/2017    Performed by Sh[facility]en [facility] at [facility] OR 1ST FLR    HEART CATH-LEFT Left 1/7/2016    Performed by Alberto Gee MD at [facility] CATH LAB    HYSTERECTOMY  1975    endometriosis    INJECTION-BOTOX N/A 3/20/2018    Performed by Shireen Mayo MD at [facility] OR 1ST FLR    INJECTION-BOTOX N/A 1/26/2017    Performed by Shireen Mayo MD at [facility] OR 1ST FLR    INJECTION-BULKING AGENT URETHRAL  OUTLET N/A 3/20/2018    Performed by Shireen [facility] at [facility] OR 1ST FLR    INSERTION-INTRAOCULAR LENS (IOL) Left 11/13/2014    Performed by Sanjana Coleman MD at [facility] OR 1ST FLR    INSERTION-SUPRAPUBIC TUBE-OPEN N/A 11/8/2016    Performed by Sh[facility]en [facility] at [facility] OR 2ND FLR    MANOMETRY-ESOPHAGEAL-WITH IMPEDANCE N/A 4/23/2018    Performed by Robert Menendez MD at [facility] ENDO (4TH FLR)    MYELOGRAM N/A 2/19/2013    Performed by [facility] Diagnostic Provider at [facility] OR 2ND FLR    pain pump placement      SQ Dilaudid Pump managed by Dr. Hillman, Pain Management    PHACOEMULSIFICATION-ASPIRATION-CATARACT Left 11/13/2014    Performed by Sanjana Coleman MD at [facility] OR 1ST FLR    ND UPPER GI ENDOSCOPY,DIAGNOSIS [74960 (CPT®)] N/A 7/16/2014    Performed by  [facility] at [facility] ENDO (4TH FLR)    SPINAL CORD STIMULATOR REMOVAL      before Lar[place]    SPINE SURGERY  5-13-13    CERVICAL FUSION    TONSILLECTOMY       Family History   Problem Relation Age of Onset    Cancer Mother 55        breast    Cancer Father         esophagus,had laryngectomy    Esophageal cancer Father     Parkinsonism Maternal Grandmother     Tremor Maternal Grandmother     No Known Problems Brother     No Known Problems Brother     Heart disease Maternal Uncle     No Known Problems Sister     No Known Problems Maternal Aunt     No Known Problems Paternal Aunt     No Known Problems Paternal Uncle     No Known Problems Maternal Grandfather     No Known Problems Paternal Grandmother     No Known Problems Paternal Grandfather     Melanoma Neg Hx     Amblyopia Neg Hx     Blindness Neg Hx     Cataracts Neg Hx     Diabetes Neg Hx     Glaucoma Neg Hx     Hypertension Neg Hx     Macular degeneration Neg Hx     Retinal detachment Neg Hx     Strabismus Neg Hx     Stroke Neg Hx     Thyroid disease Neg Hx     Colon cancer Neg Hx      Social History     Tobacco Use    Smoking status: Never Smoker    Smokeless tobacco: Never Used   Substance Use Topics    Alcohol use: Yes    Drug use: No     Review of Systems   Constitutional: Positive for diaphoresis. Negative for chills and fever.   HENT: Negative for congestion, rhinorrhea and sore throat.    Eyes: Negative for redness and visual disturbance.   Respiratory: Negative for cough, shortness of breath and wheezing.    Cardiovascular: Positive for chest pain. Negative for palpitations.   Gastrointestinal: Positive for abdominal pain and nausea. Negative for diarrhea and vomiting.   Genitourinary: Negative for dysuria and hematuria.   Musculoskeletal: Negative for back pain, myalgias and neck pain.   Skin: Negative for rash.   Neurological: Negative for dizziness, weakness and light-headedness.   Psychiatric/Behavioral:  Negative for confusion.       Physical Exam     Initial Vitals [02/23/19 2240]   BP Pulse Resp Temp SpO2   (!) 126/59 90 17 98.6 °F (37 °C) 95 %      MAP       --         Physical Exam    Nursing note and vitals reviewed.  Constitutional: She appears well-developed and well-nourished. She is not diaphoretic. No distress.   HENT:   Head: Normocephalic and atraumatic.   Right Ear: External ear normal.   Left Ear: External ear normal.   Nose: Nose normal.   Eyes: EOM are normal. Pupils are equal, round, and reactive to light.   Neck: Normal range of motion. Neck supple.   Cardiovascular: Normal rate, regular rhythm and normal heart sounds.   No murmur heard.  2+ radial pulses bilaterally   Pulmonary/Chest: Breath sounds normal. No respiratory distress. She has no wheezes. She has no rhonchi. She has no rales.   Abdominal: Soft. Bowel sounds are normal. She exhibits no distension. There is tenderness. There is no rebound and no guarding.   Diffuse mild abdominal tenderness     Genitourinary:   Genitourinary Comments: Motley in place   Musculoskeletal: Normal range of motion. She exhibits no tenderness.   Neurological: She is alert and oriented to person, place, and time. No cranial nerve deficit. GCS score is 15. GCS eye subscore is 4. GCS verbal subscore is 5. GCS motor subscore is 6.   Skin: Skin is warm and dry. Capillary refill takes less than 2 seconds.         ED Course   Procedures  Labs Reviewed   CBC W/ AUTO DIFFERENTIAL - Abnormal; Notable for the following components:       Result Value    MCHC 31.9 (*)     Lymph # 0.7 (*)     Mono # 1.2 (*)     Gran% 80.2 (*)     Lymph% 7.2 (*)     All other components within normal limits   COMPREHENSIVE METABOLIC PANEL - Abnormal; Notable for the following components:    Glucose 152 (*)     BUN, Bld 6 (*)     Albumin 2.9 (*)     All other components within normal limits   B-TYPE NATRIURETIC PEPTIDE - Abnormal; Notable for the following components:     (*)     All  other components within normal limits   TROPONIN I   LIPASE   LIPASE   TROPONIN I          Imaging Results          CT Renal Stone Study ABD Pelvis WO (Final result)  Result time 02/24/19 02:27:42    Final result by Tasha Briones MD (02/24/19 02:27:42)                 Impression:      Small pericardial and pleural effusions.    Hiatal hernia. Tiny residual hyperdensity seen in the small hiatal hernia.  Question any ingestion of hyperdense material.      Electronically signed by: Tasha Briones  Date:    02/24/2019  Time:    02:27             Narrative:    EXAMINATION:  CT RENAL STONE STUDY ABD PELVIS WO    CLINICAL HISTORY:  Abdominal pain, unspecified;    TECHNIQUE:  Low dose axial images, sagittal and coronal reformations were obtained from the lung bases to the pubic symphysis.  Contrast was not administered.    COMPARISON:  06/10/2018    FINDINGS:  Within limits of a noncontrast examination and streak artifact from the metallic spinal hardware the liver, spleen, pancreas, adrenal glands, and kidneys are unremarkable.    There is no definite abdominal ascites or adenopathy.    In the pelvis there is a suprapubic catheter there arm.    At the lung bases, there is small bilateral pleural effusions with associated compressive atelectasis.  There is small pericardial effusion.  Spinal leads are noted.  There is a stimulator pack over the right anterolateral pelvis.    There is a hiatal hernia.  There is some hyperdense material seen at the right posterolateral aspect of the hernia.  Question history of prior hernia repair.  Similar appearance was seen on the study from 06/10/2018.  There is a tiny residual hyperdensity in the hiatal hernia (series 2 axial image 11).  There is spinal hardware.  There is mild arm significant dextroscoliosis.                               US Abdomen Limited (Final result)  Result time 02/24/19 00:51:55    Final result by Tasha Briones MD (02/24/19 00:51:55)                  Impression:      No significant sonographic abnormality.      Electronically signed by: Tasha Briones  Date:    02/24/2019  Time:    00:51             Narrative:    EXAMINATION:  US ABDOMEN LIMITED    CLINICAL HISTORY:  RUQ PAIN;    TECHNIQUE:  Limited ultrasound of the right upper quadrant of the abdomen (including pancreas, liver, gallbladder, common bile duct, and right kidney) was performed.    COMPARISON:  05/07/2018    FINDINGS:  Liver: Normal in size, measuring 17 cm. Homogeneous echotexture. No focal hepatic lesions.    Gallbladder: No calculi, wall thickening, or pericholecystic fluid.  No sonographic Mckeon's sign.    Biliary system: The common duct is not dilated, measuring 1.7 mm.  No intrahepatic ductal dilatation.    Right kidney: Normal in size with no hydronephrosis, measuring 10.2 cm.    Miscellaneous: Aorta is not visualized.                               X-Ray Chest AP Portable (Final result)  Result time 02/23/19 23:19:01    Final result by Tasha Briones MD (02/23/19 23:19:01)                 Impression:      No acute intrathoracic abnormality.  No significant change.      Electronically signed by: Tasha Briones  Date:    02/23/2019  Time:    23:19             Narrative:    EXAMINATION:  XR CHEST AP PORTABLE    CLINICAL HISTORY:  Chest Pain;    TECHNIQUE:  Single frontal view of the chest was performed.    COMPARISON:  02/21/2019    FINDINGS:  Cardiac silhouette is stable.  There is no focal consolidation, pneumothorax, or pleural effusion.  There is an incompletely imaged anterior cervical screw plate device arm.  There are spinal leads.                                 Medical Decision Making:   Initial Assessment:   Patient here with same CP that she had 2 days ago  Differential Diagnosis:   Myocardial ischemia, pericarditis, pulmonary embolus, chest wall pain, pleural inflammation and pulmonary infectious causes.    ED Management:  Upon re-evaluation, the patient's status has  improved.  She is resting comfortably in ED room.    After complete ED evaluation, clinical impression is most consistent with CP. The patient was seen in ED 2 days ago and was admitted for the same complaint. Per , the patient was discharged yesterday with negative results. Chart review reveals that patient had 3 negative sets of troponins, no ischemic changes on EKG, CTA chest that was negative for PE and seen by cardiology who did not recommend any further cardiac workup.    PCP follow-up within 1 week was recommended.    After taking into careful account the patient's history, physical exam findings, as well as empirical and objective data obtained throughout ED workup, I feel no emergent medical condition has been identified. No further evaluation or admission was felt to be required, and the patient is stable for discharge from the ED. The patient and any additional family present were updated with test results, overall clinical impression, and recommended further plan of care, including discharge instructions as provided and outpatient follow-up for continued evaluation and management as needed. All questions were answered. The patient expressed understanding and agreed with current plan for discharge and follow-up plan of care. Strict ED return precautions were provided, including return/worsening of current symptoms, new symptoms, or any other concerns.                     ED Course as of Feb 24 0342   Sat Feb 23, 2019   2237 EKG with NSR, rate of 90 bpm.  Normal intervals, normal conduction.  No STEMI  [LD]   2257 BP: (!) 126/59 [LD]   2257 Temp: 98.6 °F (37 °C) [LD]   2257 Pulse: 90 [LD]   2257 Resp: 17 [LD]   Sun Feb 24, 2019   0159 Troponin I: <0.006 [LD]   0340 Troponin I: <0.006 [LD]      ED Course User Index  [LD] Tracey Gill MD     Clinical Impression:     1. Chest pain      Disposition:   Disposition: Discharged  Condition: Stable    ITracey,  personally performed the  services described in this documentation. All medical record entries made by the scribe were at my direction and in my presence.  I have reviewed the chart and agree that the record reflects my personal performance and is accurate and complete. Tracey Gill M.D. 4:25 AM02/24/2019                     Tracey Gill MD  02/24/19 0427

## 2019-02-25 ENCOUNTER — TELEPHONE (OUTPATIENT)
Dept: INTERNAL MEDICINE | Facility: CLINIC | Age: 64
End: 2019-02-25

## 2019-02-25 LAB
BACTERIA UR CULT: NORMAL
BACTERIA UR CULT: NORMAL

## 2019-02-25 NOTE — TELEPHONE ENCOUNTER
----- Message from Marie Bryson sent at 2/25/2019 12:29 PM CST -----  Contact: Celina/ [facility]/ [phone]  Calling to speak with someone in regards to the pt.She states that the pt has been to [facility] twice in the past 4 days due to her BP being extremely low. She wants to know if the pt can get a sooner appt than the one that's scheduled on 03/07/19. Please call back and advise.      Thanks

## 2019-02-28 ENCOUNTER — OFFICE VISIT (OUTPATIENT)
Dept: INTERNAL MEDICINE | Facility: CLINIC | Age: 64
End: 2019-02-28
Payer: MEDICARE

## 2019-02-28 ENCOUNTER — OFFICE VISIT (OUTPATIENT)
Dept: PSYCHIATRY | Facility: CLINIC | Age: 64
End: 2019-02-28
Payer: MEDICARE

## 2019-02-28 VITALS
HEIGHT: 64 IN | HEART RATE: 54 BPM | DIASTOLIC BLOOD PRESSURE: 64 MMHG | BODY MASS INDEX: 27.55 KG/M2 | SYSTOLIC BLOOD PRESSURE: 84 MMHG

## 2019-02-28 VITALS
WEIGHT: 160.5 LBS | HEIGHT: 64 IN | SYSTOLIC BLOOD PRESSURE: 79 MMHG | HEART RATE: 76 BPM | DIASTOLIC BLOOD PRESSURE: 47 MMHG | BODY MASS INDEX: 27.4 KG/M2

## 2019-02-28 DIAGNOSIS — N39.0 RECURRENT UTI (URINARY TRACT INFECTION): ICD-10-CM

## 2019-02-28 DIAGNOSIS — F33.41 RECURRENT MAJOR DEPRESSION IN PARTIAL REMISSION: ICD-10-CM

## 2019-02-28 DIAGNOSIS — G47.00 INSOMNIA, UNSPECIFIED TYPE: ICD-10-CM

## 2019-02-28 DIAGNOSIS — I95.9 HYPOTENSION, UNSPECIFIED HYPOTENSION TYPE: ICD-10-CM

## 2019-02-28 DIAGNOSIS — M94.0 COSTOCHONDRITIS: ICD-10-CM

## 2019-02-28 DIAGNOSIS — F41.1 GENERALIZED ANXIETY DISORDER: ICD-10-CM

## 2019-02-28 DIAGNOSIS — G89.4 CHRONIC PAIN SYNDROME: Primary | Chronic | ICD-10-CM

## 2019-02-28 PROCEDURE — 99214 PR OFFICE/OUTPT VISIT, EST, LEVL IV, 30-39 MIN: ICD-10-PCS | Mod: HCNC,S$GLB,, | Performed by: PSYCHIATRY & NEUROLOGY

## 2019-02-28 PROCEDURE — 3008F BODY MASS INDEX DOCD: CPT | Mod: HCNC,CPTII,S$GLB, | Performed by: INTERNAL MEDICINE

## 2019-02-28 PROCEDURE — 99214 OFFICE O/P EST MOD 30 MIN: CPT | Mod: HCNC,S$GLB,, | Performed by: PSYCHIATRY & NEUROLOGY

## 2019-02-28 PROCEDURE — 3008F BODY MASS INDEX DOCD: CPT | Mod: HCNC,CPTII,S$GLB, | Performed by: PSYCHIATRY & NEUROLOGY

## 2019-02-28 PROCEDURE — 99999 PR PBB SHADOW E&M-EST. PATIENT-LVL III: ICD-10-PCS | Mod: PBBFAC,HCNC,, | Performed by: INTERNAL MEDICINE

## 2019-02-28 PROCEDURE — 99999 PR PBB SHADOW E&M-EST. PATIENT-LVL III: CPT | Mod: PBBFAC,HCNC,, | Performed by: PSYCHIATRY & NEUROLOGY

## 2019-02-28 PROCEDURE — 3008F PR BODY MASS INDEX (BMI) DOCUMENTED: ICD-10-PCS | Mod: HCNC,CPTII,S$GLB, | Performed by: PSYCHIATRY & NEUROLOGY

## 2019-02-28 PROCEDURE — 99215 OFFICE O/P EST HI 40 MIN: CPT | Mod: HCNC,S$GLB,, | Performed by: INTERNAL MEDICINE

## 2019-02-28 PROCEDURE — 99999 PR PBB SHADOW E&M-EST. PATIENT-LVL III: CPT | Mod: PBBFAC,HCNC,, | Performed by: INTERNAL MEDICINE

## 2019-02-28 PROCEDURE — 3008F PR BODY MASS INDEX (BMI) DOCUMENTED: ICD-10-PCS | Mod: HCNC,CPTII,S$GLB, | Performed by: INTERNAL MEDICINE

## 2019-02-28 PROCEDURE — 99215 PR OFFICE/OUTPT VISIT, EST, LEVL V, 40-54 MIN: ICD-10-PCS | Mod: HCNC,S$GLB,, | Performed by: INTERNAL MEDICINE

## 2019-02-28 PROCEDURE — 99999 PR PBB SHADOW E&M-EST. PATIENT-LVL III: ICD-10-PCS | Mod: PBBFAC,HCNC,, | Performed by: PSYCHIATRY & NEUROLOGY

## 2019-02-28 RX ORDER — QUETIAPINE FUMARATE 100 MG/1
100 TABLET, FILM COATED ORAL NIGHTLY
Qty: 30 TABLET | Refills: 3 | Status: SHIPPED | OUTPATIENT
Start: 2019-02-28 | End: 2019-05-28 | Stop reason: SDUPTHER

## 2019-02-28 RX ORDER — BUTALBITAL, ACETAMINOPHEN AND CAFFEINE 50; 325; 40 MG/1; MG/1; MG/1
1 TABLET ORAL EVERY 4 HOURS PRN
COMMUNITY
Start: 2015-04-23 | End: 2021-11-15 | Stop reason: CLARIF

## 2019-02-28 RX ORDER — BUPROPION HYDROCHLORIDE 300 MG/1
TABLET ORAL
Qty: 30 TABLET | Refills: 2 | Status: SHIPPED | OUTPATIENT
Start: 2019-02-28 | End: 2019-05-28

## 2019-02-28 RX ORDER — TRAZODONE HYDROCHLORIDE 100 MG/1
300 TABLET ORAL NIGHTLY
Qty: 90 TABLET | Refills: 2 | Status: SHIPPED | OUTPATIENT
Start: 2019-02-28 | End: 2019-06-11 | Stop reason: SDUPTHER

## 2019-02-28 RX ORDER — FLUOXETINE HYDROCHLORIDE 20 MG/1
60 CAPSULE ORAL DAILY
Qty: 90 CAPSULE | Refills: 2 | Status: SHIPPED | OUTPATIENT
Start: 2019-02-28 | End: 2019-03-26 | Stop reason: SDUPTHER

## 2019-02-28 RX ORDER — BUPROPION HYDROCHLORIDE 300 MG/1
300 TABLET ORAL DAILY
Qty: 30 TABLET | Refills: 2 | Status: SHIPPED | OUTPATIENT
Start: 2019-02-28 | End: 2019-02-28

## 2019-02-28 RX ORDER — FUROSEMIDE 40 MG/1
40 TABLET ORAL DAILY
Qty: 90 TABLET | Refills: 3 | Status: SHIPPED | OUTPATIENT
Start: 2019-02-28 | End: 2019-05-28 | Stop reason: SDUPTHER

## 2019-02-28 NOTE — PROGRESS NOTES
Subjective:       Patient ID: Tasha Hawley is a 63 y.o. female.    Chief Complaint: Follow-up    HPI - Ms Hawley has been in and out of the ER for known MSK chest pain.  Her home health nurse is making her more nervous about it.  She is struggling with hypotension for unclear reasons.  Taking all meds as prescribed, though she didn't bring in pill bottles.  She only has lasix that would affect BP.  She's on two abx right now for recurrent UTI.  Told long story today about her pain pump possibly leaking; seeing pain management about this later today.  She is not a smoker.    PMH:  Chronic insomnia.  Chronic low back pain with narcotic use.  Indwelling narcotic pump  History CVA with dysarthria  Neurogenic bladder, with recurrent UTI's.  SP catheter placed Nov 2016  Hypothyroidism, stable  CECI, not on CPAP  CAD, with ACS in Jan 2016 - subtot mid LAD lesion without PCI; ischemic CM with EF 40% and chronic LE edema. Followed by [facility] cardiology  Anxiety and Depression  Chronic constipation, likely d/t ongoing narcotic use  Intermittent nausea  Improving LE edema     Meds: Reviewed and reconciled in EPIC with patient during visit today.    Review of Systems   Constitutional: Positive for fatigue.   HENT: Negative for congestion.    Respiratory: Negative for shortness of breath.    Cardiovascular: Positive for chest pain (chest wall).   Gastrointestinal: Negative for abdominal pain.   Genitourinary: Positive for difficulty urinating.   Musculoskeletal: Positive for arthralgias.   Skin: Negative for rash.   Neurological: Negative for headaches.   Psychiatric/Behavioral: Positive for sleep disturbance.       Objective:      Physical Exam   Constitutional: She appears well-developed and well-nourished. No distress.   Frail woman appearing older than stated age.  Examined in wheelchair d/t frailty   HENT:   Head: Normocephalic and atraumatic.   Cardiovascular: Normal rate, regular rhythm and normal heart sounds.  Exam reveals no gallop and no friction rub.   No murmur heard.  Though she says she has a mass along chest wall, I could not palpate anything   Pulmonary/Chest: Effort normal and breath sounds normal. No respiratory distress. She has no wheezes. She has no rales. She exhibits no tenderness.   Musculoskeletal: She exhibits edema.   Neurological: She is alert.   Skin: Skin is warm and dry. She is not diaphoretic. No erythema.   Psychiatric: She has a normal mood and affect.   Nursing note and vitals reviewed.      Assessment:       1. Chronic pain syndrome    2. Costochondritis    3. Generalized anxiety disorder    4. Hypotension, unspecified hypotension type    5. Recurrent UTI (urinary tract infection)        Plan:       Tasha was seen today for follow-up.    Diagnoses and all orders for this visit:    Chronic pain syndrome - unstable.  Stay on current regimen and f/u with pain clinic    Costochondritis - unstable, causing ER visits.  While I could not palpate a mass, I could definitely reproduce her chest wall pain with palpation along the left chest wall.  Encouraged her to stay out of the ER for this, to take tylenol and use heating pads/topical creams    Generalized anxiety disorder - stable, but on too much medication.  Removing wellbutrin; will stay on prozac.  Combo could be causing hypotension  -     buPROPion (WELLBUTRIN XL) 300 MG 24 hr tablet; Taper to off:  1/2 tab daily for a week, then 1/2 tab every other day for a week, then stop    Hypotension, unspecified hypotension type - not clear what this cause may be.  Cut lasix to daily use.  Skip when systolic BP below 80  -     furosemide (LASIX) 40 MG tablet; Take 1 tablet (40 mg total) by mouth once daily.    Recurrent UTI (urinary tract infection) - per ID    rtc 3 months    PAPO Hodges MD MPH  Staff Internist

## 2019-02-28 NOTE — PROGRESS NOTES
"Outpatient Psychiatry Follow-Up Visit (MD/NP)    2/28/2019    Clinical Status of Patient:  Outpatient (Ambulatory)    Chief Complaint:  Tasha Hawley is a 63 y.o. female who presents today for follow-up of depression and anxiety.  Met with patient.      Interval History and Content of Current Session:  Interim Events/Subjective Report/Content of Current Session: Despite all her medical problems, she seems to be coping well.  Her mood is good today.  She and her  are getting along better recently.  No complaints about meds.    Psychotherapy:  · Target symptoms: depression, anxiety   · Why chosen therapy is appropriate versus another modality: relevant to diagnosis  · Outcome monitoring methods: self-report, observation  · Therapeutic intervention type: supportive psychotherapy  · Topics discussed/themes: stress related to medical comorbidities, building skills sets for symptom management, symptom recognition  · The patient's response to the intervention is accepting. The patient's progress toward treatment goals is good.   · Duration of intervention: 10 minutes.    Review of Systems   · PSYCHIATRIC: Pertinant items are noted in the narrative.    Past Medical, Family and Social History: The patient's past medical, family and social history have been reviewed and updated as appropriate within the electronic medical record - see encounter notes.    Compliance: yes    Side effects: None    Risk Parameters:  Patient reports no suicidal ideation  Patient reports no homicidal ideation  Patient reports no self-injurious behavior  Patient reports no violent behavior    Exam (detailed: at least 9 elements; comprehensive: all 15 elements)   Constitutional  Vitals:  Most recent vital signs, dated less than 90 days prior to this appointment, were reviewed.   Vitals:    02/28/19 0800   BP: (!) 79/47   Pulse: 76   Weight: 72.8 kg (160 lb 7.9 oz)   Height: 5' 4" (1.626 m)        General:  unremarkable, age appropriate "     Musculoskeletal  Muscle Strength/Tone:  no tremor   Gait & Station:  wide based, uses walker     Psychiatric  Speech:  no latency; no press   Mood & Affect:  euthymic  congruent and appropriate   Thought Process:  normal and logical   Associations:  intact   Thought Content:  normal, no suicidality, no homicidality, delusions, or paranoia   Insight:  intact   Judgement: behavior is adequate to circumstances   Orientation:  grossly intact   Memory: intact for content of interview   Language: grossly intact   Attention Span & Concentration:  able to focus   Fund of Knowledge:  intact and appropriate to age and level of education     Assessment and Diagnosis   Status/Progress: Based on the examination today, the patient's problem(s) is/are well controlled.  New problems have not been presented today.   Co-morbidities are complicating management of the primary condition.  There are no active rule-out diagnoses for this patient at this time.     General Impression: doing better      ICD-10-CM ICD-9-CM   1. Recurrent major depression in partial remission F33.41 296.35   2. Insomnia, unspecified type G47.00 780.52       Intervention/Counseling/Treatment Plan   · Medication Management: Continue current medications.      Return to Clinic: 2 months

## 2019-03-04 ENCOUNTER — TELEPHONE (OUTPATIENT)
Dept: PHARMACY | Facility: CLINIC | Age: 64
End: 2019-03-04

## 2019-03-08 ENCOUNTER — TELEPHONE (OUTPATIENT)
Dept: RESEARCH | Facility: CLINIC | Age: 64
End: 2019-03-08

## 2019-03-08 NOTE — TELEPHONE ENCOUNTER
----- Message from Corinna Biswas MA sent at 3/8/2019  3:03 PM CST -----  Contact: Tasha  tel:    [phone]      ----- Message -----  From: Monik Euceda  Sent: 3/8/2019   2:03 PM  To: Mika Campbell Staff    Pt.says she just finished her antibiotics.   Having same symptoms again:  Having a lot of pressure, and pain.     Asking you to please call her today.  Asap.    As per pt.

## 2019-03-08 NOTE — TELEPHONE ENCOUNTER
Contact her home health agency ([facility]).  See if they can get a fresh urine sample for urinalysis and urine culture.  Please ask them to send it to [facility].

## 2019-03-14 ENCOUNTER — TELEPHONE (OUTPATIENT)
Dept: INFECTIOUS DISEASES | Facility: CLINIC | Age: 64
End: 2019-03-14

## 2019-03-14 NOTE — TELEPHONE ENCOUNTER
Spoke to patient by phone.  Final results of the urine cultures have not been received yet.  Please contact her home health nurse and see if we can get this faxed to us tomorrow.

## 2019-03-14 NOTE — TELEPHONE ENCOUNTER
----- Message from Corinna Biswas MA sent at 3/14/2019  4:24 PM CDT -----  Contact: Mika pt       ----- Message -----  From: Amanda Null  Sent: 3/14/2019   4:09 PM  To: Mika Campbell Staff    Pt calling for urine results     Pt barely urinating  - burning and a lot of pressure     Pt does not want any oral med     Please call ph [phone]    Thanks

## 2019-03-15 ENCOUNTER — TELEPHONE (OUTPATIENT)
Dept: INFECTIOUS DISEASES | Facility: CLINIC | Age: 64
End: 2019-03-15

## 2019-03-15 NOTE — TELEPHONE ENCOUNTER
----- Message from Corinna Biswas MA sent at 3/14/2019  4:57 PM CDT -----  Spoke to patient by phone.  Final results of the urine cultures have not been received yet.  Please contact her home health nurse and see if we can get this faxed to us tomorrow.

## 2019-03-15 NOTE — TELEPHONE ENCOUNTER
Spoke with Kary from [facility] and verified that the culture results for the pt was received

## 2019-03-15 NOTE — TELEPHONE ENCOUNTER
64 y/o with a history of neurogenic bladder managed with a suprapubic catheter and recurrent UTI.  Over the past 1-2 weeks, she has been complaining of 'pain on her kidneys' and dark colored urine.  Urinalysis and urine cultures were obtained from her home health agency and are listed below.        Assessment  1.  Possible pyelonephritis    Plan  Unfortunately, the urine cultures did not provide an answer.  She is complaining of significant distress due to pain and is very concerned about a urinary infection.  I have advised her to go to the ER.  She should have abdominal imaging, urine cultures and urinalysis, CBC.  She may benefit from a brief inpatient stay given how ill she says she is.

## 2019-03-15 NOTE — TELEPHONE ENCOUNTER
----- Message from Corinna Biswas MA sent at 3/15/2019  1:38 PM CDT -----  Contact:   Patient    [phone]      ----- Message -----  From: Samara Gentile  Sent: 3/15/2019   1:03 PM  To: Mika Campbell Staff    Needs Advice    Reason for call:    Pt in pain and having problems with her Kidney ,   Give the pt a call  back to discuss         Communication Preference:   Phone      Additional Information:

## 2019-03-26 RX ORDER — FLUOXETINE HYDROCHLORIDE 20 MG/1
CAPSULE ORAL
Qty: 90 CAPSULE | Refills: 2 | Status: SHIPPED | OUTPATIENT
Start: 2019-03-26 | End: 2019-05-28 | Stop reason: SDUPTHER

## 2019-03-28 ENCOUNTER — TELEPHONE (OUTPATIENT)
Dept: INFECTIOUS DISEASES | Facility: CLINIC | Age: 64
End: 2019-03-28

## 2019-03-28 DIAGNOSIS — N31.9 NEUROGENIC BLADDER: Primary | Chronic | ICD-10-CM

## 2019-03-28 DIAGNOSIS — N30.00 ACUTE CYSTITIS WITHOUT HEMATURIA: ICD-10-CM

## 2019-03-28 NOTE — TELEPHONE ENCOUNTER
----- Message from Lucille Simmons MA sent at 3/28/2019  2:40 PM CDT -----  Contact: Self [phone]  Please advise  ----- Message -----  From: Nikki Stevenson  Sent: 3/28/2019   2:37 PM  To: Mika Campbell Staff    PT thinks she may have another bladder infection. She has an order, experiencing leaking and burning. Pt can be reached at [phone].

## 2019-03-28 NOTE — TELEPHONE ENCOUNTER
Called patient's  and informed of urine culture order in. He stated he will collect and bring to an [facility] for her.

## 2019-03-28 NOTE — TELEPHONE ENCOUNTER
----- Message from Nick Martel MD sent at 3/28/2019  4:26 PM CDT -----  Contact: Self [phone]  Ordered urine culture  ----- Message -----  From: Lucille Simmons MA  Sent: 3/28/2019   2:40 PM  To: Adrian Brennan MD, Nick Martel MD    Please advise  ----- Message -----  From: Nikki Stevenson  Sent: 3/28/2019   2:37 PM  To: Mika Campbell Staff    PT thinks she may have another bladder infection. She has an order, experiencing leaking and burning. Pt can be reached at [phone].

## 2019-04-02 ENCOUNTER — TELEPHONE (OUTPATIENT)
Dept: INFECTIOUS DISEASES | Facility: CLINIC | Age: 64
End: 2019-04-02

## 2019-04-02 ENCOUNTER — LAB VISIT (OUTPATIENT)
Dept: LAB | Facility: HOSPITAL | Age: 64
End: 2019-04-02
Attending: INTERNAL MEDICINE
Payer: MEDICARE

## 2019-04-02 DIAGNOSIS — N31.9 NEUROGENIC BLADDER: Chronic | ICD-10-CM

## 2019-04-02 DIAGNOSIS — N30.00 ACUTE CYSTITIS WITHOUT HEMATURIA: ICD-10-CM

## 2019-04-02 LAB
BACTERIA #/AREA URNS HPF: ABNORMAL /HPF
MICROSCOPIC COMMENT: ABNORMAL
RBC #/AREA URNS HPF: 35 /HPF (ref 0–4)
SQUAMOUS #/AREA URNS HPF: 1 /HPF
WBC #/AREA URNS HPF: 5 /HPF (ref 0–5)
WBC CLUMPS URNS QL MICRO: ABNORMAL
YEAST URNS QL MICRO: ABNORMAL

## 2019-04-02 PROCEDURE — 87184 SC STD DISK METHOD PER PLATE: CPT | Mod: HCNC

## 2019-04-02 PROCEDURE — 87088 URINE BACTERIA CULTURE: CPT | Mod: HCNC

## 2019-04-02 PROCEDURE — 87086 URINE CULTURE/COLONY COUNT: CPT | Mod: HCNC

## 2019-04-02 PROCEDURE — 87077 CULTURE AEROBIC IDENTIFY: CPT | Mod: HCNC

## 2019-04-02 PROCEDURE — 87186 SC STD MICRODIL/AGAR DIL: CPT | Mod: HCNC

## 2019-04-02 PROCEDURE — 81000 URINALYSIS NONAUTO W/SCOPE: CPT | Mod: HCNC

## 2019-04-05 ENCOUNTER — TELEPHONE (OUTPATIENT)
Dept: INFECTIOUS DISEASES | Facility: CLINIC | Age: 64
End: 2019-04-05

## 2019-04-05 DIAGNOSIS — N39.0 URINARY TRACT INFECTION ASSOCIATED WITH CYSTOSTOMY CATHETER, SUBSEQUENT ENCOUNTER: Primary | ICD-10-CM

## 2019-04-05 DIAGNOSIS — T83.510D URINARY TRACT INFECTION ASSOCIATED WITH CYSTOSTOMY CATHETER, SUBSEQUENT ENCOUNTER: Primary | ICD-10-CM

## 2019-04-05 RX ORDER — AMOXICILLIN AND CLAVULANATE POTASSIUM 500; 125 MG/1; MG/1
1 TABLET, FILM COATED ORAL 3 TIMES DAILY
Qty: 21 TABLET | Refills: 0 | Status: SHIPPED | OUTPATIENT
Start: 2019-04-05 | End: 2019-04-12

## 2019-04-06 LAB — BACTERIA UR CULT: NORMAL

## 2019-04-08 ENCOUNTER — TELEPHONE (OUTPATIENT)
Dept: INFECTIOUS DISEASES | Facility: CLINIC | Age: 64
End: 2019-04-08

## 2019-04-08 NOTE — TELEPHONE ENCOUNTER
----- Message from Marifer Bartlett MA sent at 4/8/2019  4:34 PM CDT -----  Contact: Tasha     [phone]      ----- Message -----  From: Monik Kinsey  Sent: 4/8/2019   4:08 PM  To: Mika Campbell Staff    Pt.says she is having problems with UTI's .      Asking you to please return her call today.   Using the [facility] in [place]

## 2019-04-08 NOTE — TELEPHONE ENCOUNTER
Called and spoke to the patient.  She has been on augmentin since 2 days.  She is still having pain and burning.  She will give the augmentin another 2 days to see if that works.  She will let me know.

## 2019-04-12 ENCOUNTER — TELEPHONE (OUTPATIENT)
Dept: OPTOMETRY | Facility: CLINIC | Age: 64
End: 2019-04-12

## 2019-04-16 ENCOUNTER — TELEPHONE (OUTPATIENT)
Dept: PSYCHIATRY | Facility: CLINIC | Age: 64
End: 2019-04-16

## 2019-04-16 NOTE — TELEPHONE ENCOUNTER
----- Message from Shashank Polk sent at 4/16/2019  8:58 AM CDT -----  Contact: Patient  Pt said she has not slept in three days and ask if you can please give her a call.

## 2019-04-23 ENCOUNTER — TELEPHONE (OUTPATIENT)
Dept: DERMATOLOGY | Facility: CLINIC | Age: 64
End: 2019-04-23

## 2019-04-23 NOTE — TELEPHONE ENCOUNTER
Spoke with pt and scheduled a new pt appointment to be seen for a skin cancer screening due to family history for skin cancer.

## 2019-04-23 NOTE — TELEPHONE ENCOUNTER
----- Message from Nicolette Vasquez sent at 4/23/2019 12:14 PM CDT -----  Contact: pt   DERM - pt Patient Requesting Sooner Appointment.     Reason for sooner appt.: pt is coming in for a skin check pt has a family history of skin cancer   When is the first available appointment? No new pt appts are coming up to schedule   Communication Preference: can you please call pt at [phone]  Additional Information: none    LAZARA

## 2019-04-26 ENCOUNTER — TELEPHONE (OUTPATIENT)
Dept: INFECTIOUS DISEASES | Facility: CLINIC | Age: 64
End: 2019-04-26

## 2019-04-26 DIAGNOSIS — N30.00 ACUTE CYSTITIS WITHOUT HEMATURIA: Primary | ICD-10-CM

## 2019-04-26 NOTE — TELEPHONE ENCOUNTER
Patient reports urinary symptoms.  She is frustrated and feels she needs 'antibiotic shots' instead of pills.  She feels the pills don't work.  She is also complaining of diarrhea as well.    Impression  1.  UTI  2.  Diarrhea    Plan  1.  Per patient, [facility] obtained a urine sample on 4/23/19.  Will follow up results when they are available.  2.  [phone] [facility] send stool for C diff.

## 2019-04-26 NOTE — TELEPHONE ENCOUNTER
Urine culture results reviewed.  Recommendations are to place a picc line and start her on IV cefepime 2 grams q 12 for 7 days.  Check cbc, cmp once a week while on therapy.

## 2019-04-26 NOTE — TELEPHONE ENCOUNTER
----- Message from Corinna Biswas MA sent at 4/26/2019 12:47 PM CDT -----  Contact:   Pt   [phone]      ----- Message -----  From: Samara Gentile  Sent: 4/26/2019  12:15 PM  To: Mika Campbell Staff    Needs Advice    Reason for call:  Pt called stating that she has another bladder/kidney  Infection...  Pt seeking further advice         Communication Preference:  Phone      Additional Information:

## 2019-04-29 ENCOUNTER — TELEPHONE (OUTPATIENT)
Dept: INFECTIOUS DISEASES | Facility: CLINIC | Age: 64
End: 2019-04-29

## 2019-04-29 NOTE — TELEPHONE ENCOUNTER
----- Message from Nikki Stevenson sent at 4/29/2019  3:08 PM CDT -----  Contact: Self [phone]  PT wants to know what time should she come tomorrow to have a port inserted.

## 2019-04-29 NOTE — TELEPHONE ENCOUNTER
----- Message from Monik Euceda sent at 4/29/2019  2:24 PM CDT -----  Contact: Cat mike/ [phone] [facility]  Needs Advice    Reason for call:   Are they supposed to be putting the picc line in ; or are you?          Communication Preference:  Phone     Additional Information:  pls call today.

## 2019-04-29 NOTE — TELEPHONE ENCOUNTER
Spoke with pt and informed her that [facility] will be putting in her port and they will be contacting her

## 2019-04-30 ENCOUNTER — TELEPHONE (OUTPATIENT)
Dept: INFECTIOUS DISEASES | Facility: CLINIC | Age: 64
End: 2019-04-30

## 2019-04-30 ENCOUNTER — TELEPHONE (OUTPATIENT)
Dept: PSYCHIATRY | Facility: CLINIC | Age: 64
End: 2019-04-30

## 2019-04-30 NOTE — TELEPHONE ENCOUNTER
Returned call to celina from  and she stated patient inquiring about iv abx information. Stated [employer] will be contacting patient in regards to setting up for patient. Celina from  also wanted to inform  pt bp is borderline low asking id they need to do anything for that such as a fluid boost? Patient BP was 82/52 at 9am-9:30am and now 90/58 10:15am. Pt took lasik and legs are swollen. Please advise

## 2019-04-30 NOTE — TELEPHONE ENCOUNTER
----- Message from Monik Kinsey sent at 4/30/2019  9:56 AM CDT -----  Contact: Celina mike/ [employer]  tel:  [phone]    Needs Advice    Reason for call:   Are you setting her up for IV meds.?   Her BP is low.      Pls call to discuss this asap.         Communication Preference:  Phone     Additional Information:  Pls call

## 2019-04-30 NOTE — TELEPHONE ENCOUNTER
----- Message from Lina Cobian sent at 4/30/2019  8:53 AM CDT -----  Contact: pt  Pt is asking that you call her she just wants to let you know whats going on with her.  Her cb # [phone]

## 2019-04-30 NOTE — TELEPHONE ENCOUNTER
Called HH back to instruct pt to drink more fluids specifically Powerade and/or Gatorade and recheck BP this afternoon. HH stated they don't go out more then once in one day. Instructed to have patient take BP if home and call us with results this afternoon but if at [facility] then they will check there.

## 2019-04-30 NOTE — TELEPHONE ENCOUNTER
Will see if her BP improves in the afternoon.  Hope to start IV antibiotics on her as soon as possible.

## 2019-05-01 ENCOUNTER — TELEPHONE (OUTPATIENT)
Dept: ADMINISTRATIVE | Facility: CLINIC | Age: 64
End: 2019-05-01

## 2019-05-06 ENCOUNTER — TELEPHONE (OUTPATIENT)
Dept: INFECTIOUS DISEASES | Facility: CLINIC | Age: 64
End: 2019-05-06

## 2019-05-06 NOTE — TELEPHONE ENCOUNTER
Patient's EOC is today. Patient is currently on cefapime 2 grams q 12 hours for 7 days. Spoke with Kinsey with [facility] [employer] ([phone])     No follow up appointment scheduled.    Please advise.

## 2019-05-07 ENCOUNTER — TELEPHONE (OUTPATIENT)
Dept: OTOLARYNGOLOGY | Facility: CLINIC | Age: 64
End: 2019-05-07

## 2019-05-07 DIAGNOSIS — H83.93: ICD-10-CM

## 2019-05-07 DIAGNOSIS — H61.20 WAX IN EAR: Primary | ICD-10-CM

## 2019-05-07 NOTE — TELEPHONE ENCOUNTER
----- Message from Lamont Cruz MA sent at 5/6/2019  4:28 PM CDT -----  Contact:  Tasha   tel:   [phone]         ----- Message -----  From: Monik Kinsey  Sent: 5/6/2019   4:24 PM  To: Mika Campbell Staff    Needs Advice    Reason for call:    Pt.says she is having pressure and burning and leakage.     Asking you to please return her call today.     Says  She is having problems with the IV antibiotics that you are giving her for the Kidney and Bladder problems.         Communication Preference: phone     Additional Information:   Would like to speak w/ you today.

## 2019-05-07 NOTE — TELEPHONE ENCOUNTER
Arrange appointment with Dr. Mayo.  She is currently on IV antibiotics and her symptoms do not appear to be better.    Arrange for her home health nurse to obtain a urine sample for urinalysis and culture.  Notify patient that I am currently out of the office and won't be back until the end of the week.

## 2019-05-09 ENCOUNTER — OFFICE VISIT (OUTPATIENT)
Dept: INFECTIOUS DISEASES | Facility: CLINIC | Age: 64
End: 2019-05-09
Payer: MEDICARE

## 2019-05-09 ENCOUNTER — INFUSION (OUTPATIENT)
Dept: INFECTIOUS DISEASES | Facility: HOSPITAL | Age: 64
End: 2019-05-09
Attending: INTERNAL MEDICINE
Payer: MEDICARE

## 2019-05-09 VITALS
BODY MASS INDEX: 26.88 KG/M2 | SYSTOLIC BLOOD PRESSURE: 90 MMHG | WEIGHT: 157.44 LBS | HEART RATE: 52 BPM | DIASTOLIC BLOOD PRESSURE: 55 MMHG | HEIGHT: 64 IN | TEMPERATURE: 98 F

## 2019-05-09 DIAGNOSIS — N30.00 ACUTE CYSTITIS WITHOUT HEMATURIA: ICD-10-CM

## 2019-05-09 DIAGNOSIS — N39.0 RECURRENT UTI: Primary | ICD-10-CM

## 2019-05-09 PROCEDURE — 99999 PR PBB SHADOW E&M-EST. PATIENT-LVL IV: CPT | Mod: PBBFAC,,, | Performed by: INTERNAL MEDICINE

## 2019-05-09 PROCEDURE — 3008F PR BODY MASS INDEX (BMI) DOCUMENTED: ICD-10-PCS | Mod: CPTII,S$GLB,, | Performed by: INTERNAL MEDICINE

## 2019-05-09 PROCEDURE — 99999 PR PBB SHADOW E&M-EST. PATIENT-LVL IV: ICD-10-PCS | Mod: PBBFAC,,, | Performed by: INTERNAL MEDICINE

## 2019-05-09 PROCEDURE — 99214 PR OFFICE/OUTPT VISIT, EST, LEVL IV, 30-39 MIN: ICD-10-PCS | Mod: S$GLB,,, | Performed by: INTERNAL MEDICINE

## 2019-05-09 PROCEDURE — 99214 OFFICE O/P EST MOD 30 MIN: CPT | Mod: S$GLB,,, | Performed by: INTERNAL MEDICINE

## 2019-05-09 PROCEDURE — 3008F BODY MASS INDEX DOCD: CPT | Mod: CPTII,S$GLB,, | Performed by: INTERNAL MEDICINE

## 2019-05-09 RX ORDER — HEPARIN SODIUM,PORCINE/PF 10 UNIT/ML
SYRINGE (ML) INTRAVENOUS
COMMUNITY
Start: 2019-05-07 | End: 2019-05-28

## 2019-05-09 RX ORDER — FLUCONAZOLE 200 MG/1
400 TABLET ORAL DAILY
Qty: 14 TABLET | Refills: 0 | Status: SHIPPED | OUTPATIENT
Start: 2019-05-09 | End: 2019-05-16

## 2019-05-09 RX ORDER — SODIUM CHLORIDE 0.9 % (FLUSH) 0.9 %
SYRINGE (ML) INJECTION
COMMUNITY
Start: 2019-05-07 | End: 2019-05-28

## 2019-05-09 RX ORDER — CEFEPIME HYDROCHLORIDE 2 G/1
INJECTION, POWDER, FOR SOLUTION INTRAVENOUS
COMMUNITY
Start: 2019-05-07 | End: 2019-05-28

## 2019-05-09 NOTE — PROGRESS NOTES
Subjective:      Patient ID: Tasha Hawley is a 63 y.o. female.    Chief Complaint:Follow-up    History of Present Illness    62 y/o female witn a history CAD, neurogenic bladder.  Her neurogenic bladder was initially managed with botox with minimal improvement.  She suprapubic catheter placed in November of 2016 to manage this.  Since then, she says that she has had a lot of kidney and bladder infections.  Most recently, she was placed on IV cefepime 2 grams q12 for a 7 day course.  She is here today for follow up.  She reports leaking urine from urethra.  Foul smelling.  Still feels pressure and burning in her bladder.  Submitted urine sample 2 days ago for culture.    Review of Systems   Constitution: Positive for chills, decreased appetite, fever, malaise/fatigue and weight loss. Negative for night sweats and weight gain.   HENT: Positive for hearing loss. Negative for congestion, ear pain, hoarse voice, sore throat and tinnitus.    Eyes: Positive for blurred vision. Negative for redness and visual disturbance.   Cardiovascular: Positive for leg swelling. Negative for chest pain and palpitations.   Respiratory: Negative for cough, hemoptysis, shortness of breath and sputum production.    Hematologic/Lymphatic: Negative for adenopathy. Does not bruise/bleed easily.   Skin: Negative for dry skin, itching, rash and suspicious lesions.   Musculoskeletal: Positive for back pain, joint pain, myalgias and neck pain.   Gastrointestinal: Positive for abdominal pain. Negative for constipation, diarrhea, heartburn, nausea and vomiting.   Genitourinary: Positive for dysuria, hematuria and urgency. Negative for flank pain, frequency and hesitancy.   Neurological: Positive for headaches, numbness, paresthesias and weakness.   Psychiatric/Behavioral: Positive for depression. Negative for memory loss. The patient is nervous/anxious. The patient does not have insomnia.      Objective:   Physical Exam   Constitutional: She  is oriented to person, place, and time. She appears well-developed and well-nourished. No distress.   HENT:   Head: Normocephalic and atraumatic.   Right Ear: External ear normal.   Left Ear: External ear normal.   Nose: Nose normal.   Mouth/Throat: Oropharynx is clear and moist. No oropharyngeal exudate.   Eyes: Pupils are equal, round, and reactive to light. Conjunctivae and EOM are normal. Right eye exhibits no discharge. Left eye exhibits no discharge. No scleral icterus.   Neck: Normal range of motion. Neck supple. No JVD present. No tracheal deviation present. No thyromegaly present.   Cardiovascular: Normal rate, regular rhythm and intact distal pulses. Exam reveals no gallop and no friction rub.   No murmur heard.  Pulmonary/Chest: Effort normal and breath sounds normal. No stridor. No respiratory distress. She has no wheezes. She has no rales. She exhibits no tenderness.   Abdominal: Soft. Bowel sounds are normal. She exhibits no distension and no mass. There is no tenderness. There is no rebound and no guarding.   Urostomy in place   Musculoskeletal: Normal range of motion. She exhibits no edema or tenderness.   Lymphadenopathy:     She has no cervical adenopathy.   Neurological: She is alert and oriented to person, place, and time. She displays normal reflexes. No cranial nerve deficit. She exhibits normal muscle tone.   Skin: Skin is warm. No rash noted. She is not diaphoretic. No erythema. No pallor.   Psychiatric: She has a normal mood and affect. Her behavior is normal. Judgment and thought content normal.   Nursing note and vitals reviewed.     Potassium=3.7  Creatinine=0.66  Ast=15  Alt=9  Wbc=5.3  Hemoglobulin=10  Platelets=31.9    Urine cultures: >100,000 yeast    Assessment:       1. Recurrent UTI    2. Acute cystitis without hematuria          64 y/o here for follow up of UTI.  She has just completed a 7 day course of IV cefepime for a UTI.  She started experiencing symptoms again while on IV  antibiotics and repeat cultures were positive for yeast.  Will pull the picc line and stop the cefepime.  I will start her on fluconazole.  I will refer her back to urology as I am concerned she has a mechanical problem with her suprapubic tube.  Plan:       Recurrent UTI  -     Nursing communication; Future; Expected date: 05/09/2019  -     Ambulatory referral to Urology  -     fluconazole (DIFLUCAN) 200 MG Tab; Take 2 tablets (400 mg total) by mouth once daily. for 7 days  Dispense: 14 tablet; Refill: 0    Acute cystitis without hematuria  -     Nursing communication; Future; Expected date: 05/09/2019  -     Ambulatory referral to Urology  -     fluconazole (DIFLUCAN) 200 MG Tab; Take 2 tablets (400 mg total) by mouth once daily. for 7 days  Dispense: 14 tablet; Refill: 0

## 2019-05-09 NOTE — PROGRESS NOTES
Mid-line to left upper arm removed as ordered, site without redness, leaking from site, upper arm swollen with c/o pain, pt states that it's been leaking x 2 days when getting medicine.  Vaseline gauze, 2x2 pads and coban applied to site. Warm pack applied with coban, understood to remove dressing in 24 hrs., hold pressure if bleeding occurs and call MD.  Also encouraged to use moist heat at home to help with swelling, understood.  Left unit in NAD via walker with family member at side

## 2019-05-13 ENCOUNTER — OFFICE VISIT (OUTPATIENT)
Dept: UROLOGY | Facility: CLINIC | Age: 64
End: 2019-05-13
Payer: MEDICARE

## 2019-05-13 VITALS
WEIGHT: 159.63 LBS | HEIGHT: 64 IN | BODY MASS INDEX: 27.25 KG/M2 | DIASTOLIC BLOOD PRESSURE: 58 MMHG | HEART RATE: 94 BPM | SYSTOLIC BLOOD PRESSURE: 88 MMHG

## 2019-05-13 DIAGNOSIS — N31.9 NEUROGENIC BLADDER: Chronic | ICD-10-CM

## 2019-05-13 DIAGNOSIS — Z93.59 SUPRAPUBIC CATHETER: Chronic | ICD-10-CM

## 2019-05-13 DIAGNOSIS — I42.9 CARDIOMYOPATHY, UNSPECIFIED TYPE: ICD-10-CM

## 2019-05-13 DIAGNOSIS — R33.9 URINARY RETENTION: Chronic | ICD-10-CM

## 2019-05-13 DIAGNOSIS — N39.46 MIXED STRESS AND URGE URINARY INCONTINENCE: Primary | ICD-10-CM

## 2019-05-13 PROCEDURE — 99999 PR PBB SHADOW E&M-EST. PATIENT-LVL V: ICD-10-PCS | Mod: PBBFAC,,, | Performed by: UROLOGY

## 2019-05-13 PROCEDURE — 99999 PR PBB SHADOW E&M-EST. PATIENT-LVL V: CPT | Mod: PBBFAC,,, | Performed by: UROLOGY

## 2019-05-13 PROCEDURE — 3008F PR BODY MASS INDEX (BMI) DOCUMENTED: ICD-10-PCS | Mod: CPTII,S$GLB,, | Performed by: UROLOGY

## 2019-05-13 PROCEDURE — 99213 OFFICE O/P EST LOW 20 MIN: CPT | Mod: S$GLB,,, | Performed by: UROLOGY

## 2019-05-13 PROCEDURE — 99499 RISK ADDL DX/OHS AUDIT: ICD-10-PCS | Mod: HCNC,S$GLB,, | Performed by: UROLOGY

## 2019-05-13 PROCEDURE — 3008F BODY MASS INDEX DOCD: CPT | Mod: CPTII,S$GLB,, | Performed by: UROLOGY

## 2019-05-13 PROCEDURE — 99213 PR OFFICE/OUTPT VISIT, EST, LEVL III, 20-29 MIN: ICD-10-PCS | Mod: S$GLB,,, | Performed by: UROLOGY

## 2019-05-13 PROCEDURE — 99499 UNLISTED E&M SERVICE: CPT | Mod: HCNC,S$GLB,, | Performed by: UROLOGY

## 2019-05-13 NOTE — LETTER
May 13, 2019      Adrian Brennan MD  [address] [address]           [facility] - Urology 4th Floor  [address]  [facility] [address]  Phone: [phone]          Patient: Tasha Hawley   MR Number: [id number]   YOB: 1955   Date of Visit: 5/13/2019       Dear Dr. Adrian Brennan:    Thank you for referring Tasha Hawley to me for evaluation. Attached you will find relevant portions of my assessment and plan of care.    If you have questions, please do not hesitate to call me. I look forward to following Tasha Hawley along with you.    Sincerely,    Shireen Mayo MD    Enclosure  CC:  No Recipients    If you would like to receive this communication electronically, please contact [email or web address] or [phone] to request more information on [employer] Link access.    For providers and/or their staff who would like to refer a patient to [facility], please contact us through our one-stop-shop provider referral line, [facility] [place], at [phone].    If you feel you have received this communication in error or would no longer like to receive these types of communications, please e-mail [email or web address]

## 2019-05-13 NOTE — PROGRESS NOTES
CHIEF COMPLAINT:    Mrs. Hawley is a 63 y.o. female presenting for a follow up incomplete bladder emptying status post suprapubic tube, with mixed incontinence, recurrent UTI.    PRESENTING ILLNESS:    Tasha Hawley is a 63 y.o. female who returns for follow up, accompanied by her .  She states he back pain has worsened.  She has had a lower spinal fusion with significant scoliosis.  She continues to get UTI's with both stress and urge incontinence.  The last 2 cultures were positive for proteus.  She had a CT scan in February and review of the images shows some debris in the bladder.      She underwent cystoscopy, Botox injection and macroplastique in March of last year and felt there was some improvement but it wore off.  She wears a pull up because of the urinary incontinence.      REVIEW OF SYSTEMS:    Review of Systems   Constitutional: Negative.    HENT: Negative.    Eyes: Negative.    Respiratory: Negative.    Cardiovascular: Positive for leg swelling.   Gastrointestinal: Positive for constipation.   Genitourinary: Positive for urgency.        Mixed incontinence   Musculoskeletal: Positive for back pain.   Skin: Negative.    Neurological: Positive for weakness.   Endo/Heme/Allergies: Negative.    Psychiatric/Behavioral: Positive for depression. The patient is nervous/anxious.        PATIENT HISTORY:    Past Medical History:   Diagnosis Date    Anticoagulant long-term use     Anxiety     Arthritis     Bilateral lower extremity edema     severe chronic    Carotid artery occlusion     Cataract     Depression     Fever blister     Hypothyroid     Iron deficiency anemia     Lumbar radiculopathy     with chronic pain    Ocular migraine     Sleep apnea     cpap       Past Surgical History:   Procedure Laterality Date    ADENOIDECTOMY      BACK SURGERY      x 12    BIOPSY-BLADDER N/A 3/20/2018    Performed by [facility] at [facility] OR [address] [place] [address]    BIOPSY-BLADDER N/A 1/26/2017     Performed by Shireen Mayo MD at [facility] OR 1ST FLR    CATARACT EXTRACTION W/  INTRAOCULAR LENS IMPLANT Left     Dr Coleman     CYSTOSCOPY N/A 3/20/2018    Performed by [facility]en Mayo MD at [facility] OR 1ST FLR    CYSTOSCOPY N/A 1/26/2017    Performed by Sh[facility]en Mayo MD at [facility] OR 1ST FLR    CYSTOSCOPY N/A 11/8/2016    Performed by Shireen Mayo MD at [facility] OR 2ND FLR    DISCECTOMY, SPINE, CERVICAL, ANTERIOR APPROACH, WITH FUSION N/A 5/13/2013    Performed by Larry Martinez MD at [facility] OR 2ND FLR    ESOPHAGOGASTRODUODENOSCOPY (EGD) N/A 5/23/2018    Performed by Prince Vance MD at [facility] ENDO (4TH FLR)    ESOPHAGOGASTRODUODENOSCOPY (EGD) N/A 7/21/2017    Performed by Prince Vance MD at [facility] ENDO (4TH FLR)    ESOPHAGOGASTRODUODENOSCOPY (EGD) w/ dilation N/A 8/28/2017    Performed by Prince Vance MD at [facility] ENDO (4TH FLR)    FULGURATION-BLADDER N/A 1/26/2017    Performed by Shireen Mayo MD at [facility] OR 1ST FLR    HEART CATH-LEFT Left 1/7/2016    Performed by Alberto Gee MD at [facility] CATH LAB    HYSTERECTOMY  1975    endometriosis    INJECTION-BOTOX N/A 3/20/2018    Performed by Shireen Mayo MD at [facility] OR 1ST FLR    INJECTION-BOTOX N/A 1/26/2017    Performed by Shireen Mayo MD at [facility] OR 1ST FLR    INJECTION-BULKING AGENT URETHRAL  OUTLET N/A 3/20/2018    Performed by Shireen Mayo MD at [facility] OR 1ST FLR    INSERTION-INTRAOCULAR LENS (IOL) Left 11/13/2014    Performed by Sanjana Coleman MD at [facility] OR 1ST FLR    INSERTION-SUPRAPUBIC TUBE-OPEN N/A 11/8/2016    Performed by Shireen Mayo MD at [facility] OR 2ND FLR    MANOMETRY-ESOPHAGEAL-WITH IMPEDANCE N/A 4/23/2018    Performed by Robert Menendez MD at [facility] ENDO (4TH FLR)    MYELOGRAM N/A 2/19/2013    Performed by [facility] Provider at [facility] OR 2ND FLR    pain pump placement      SQ Dilaudid Pump managed by Dr. Hillman, Pain Management    PHACOEMULSIFICATION-ASPIRATION-CATARACT Left 11/13/2014    Performed  by [facility] at [facility] OR 1ST FLR    AK UPPER GI ENDOSCOPY,DIAGNOSIS [69392 (CPT®)] N/A 7/16/2014    Performed by Prince Vance MD at [facility] ENDO (4TH FLR)    SPINAL CORD STIMULATOR REMOVAL      before Larissa    SPINE SURGERY  5-13-13    CERVICAL FUSION    TONSILLECTOMY         Family History   Problem Relation Age of Onset    Cancer Mother 55        breast    Cancer Father         esophagus,had laryngectomy    Esophageal cancer Father     Parkinsonism Maternal Grandmother     Tremor Maternal Grandmother     Heart disease Maternal Uncle      Socioeconomic History    Marital status:    Tobacco Use    Smoking status: Never Smoker    Smokeless tobacco: Never Used   Substance and Sexual Activity    Alcohol use: Yes    Drug use: No    Sexual activity: Never       Allergies:  (d)-limonene flavor; Bactrim [sulfamethoxazole-trimethoprim]; Benadryl [diphenhydramine hcl]; Imitrex [sumatriptan succinate]; Topamax [topiramate]; Vancomycin; Butorphanol tartrate; Darvocet a500 [propoxyphene n-acetaminophen]; Fentanyl; White petrolatum-zinc; Zinc oxide-white petrolatum; Latex, natural rubber; and Phenytoin    Medications:  Outpatient Encounter Medications as of 5/13/2019   Medication Sig Dispense Refill    atorvastatin (LIPITOR) 80 MG tablet TAKE 1 TABLET BY MOUTH DAILY 90 tablet 3    BD POSIFLUSH NORMAL SALINE 0.9 injection       buPROPion (WELLBUTRIN XL) 300 MG 24 hr tablet Taper to off:  1/2 tab daily for a week, then 1/2 tab every other day for a week, then stop 30 tablet 2    butalbital-acetaminophen-caffeine -40 mg (FIORICET, ESGIC) -40 mg per tablet       ceFEPIme (MAXIPIME) 2 gram injection       clopidogrel (PLAVIX) 75 mg tablet Take 75 mg by mouth once daily.      docusate sodium (COLACE) 100 MG capsule Take 1 capsule (100 mg total) by mouth 3 (three) times daily as needed for constipation. 180 capsule 3    fluconazole (DIFLUCAN) 200 MG Tab Take 2 tablets (400 mg  total) by mouth once daily. for 7 days 14 tablet 0    FLUoxetine 20 MG capsule TAKE THREE CAPSULES BY MOUTH DAILY 90 capsule 2    furosemide (LASIX) 40 MG tablet Take 1 tablet (40 mg total) by mouth once daily. 90 tablet 3    HEPARIN LOCKFLUSH,PORCINE,,PF, 10 unit/mL Syrg       hydrocodone-acetaminophen 10-325mg (NORCO)  mg Tab Take 2 tablets by mouth every 4 (four) hours as needed for Pain.       levothyroxine (SYNTHROID) 125 MCG tablet Take 1 tablet (125 mcg total) by mouth before breakfast. 90 tablet 3    lidocaine (LIDODERM) 5 % APPLY 1 PATCH DAILY AND WEAR FOR A MAXIMUM OF 12 HOURS  5    oxybutynin (DITROPAN XL) 15 MG TR24 Take 1 tablet (15 mg total) by mouth once daily. 30 tablet 11    pantoprazole (PROTONIX) 40 MG tablet Take 1 tablet (40 mg total) by mouth once daily. 90 tablet 3    potassium chloride SA (K-DUR,KLOR-CON) 20 MEQ tablet Take 20 mEq by mouth 2 (two) times daily.      potassium citrate (UROCIT-K) 10 mEq (1,080 mg) TbSR Take 1 tablet (10 mEq total) by mouth 3 (three) times daily with meals. 90 tablet 11    QUEtiapine (SEROQUEL) 100 MG Tab Take 1 tablet (100 mg total) by mouth every evening. 30 tablet 3    ranitidine (ZANTAC) 150 MG capsule Take 1 capsule (150 mg total) by mouth 2 (two) times daily. 180 capsule 3    SENNOSIDES (SENNA LAX ORAL) Take 20 mg by mouth every evening.       traZODone (DESYREL) 100 MG tablet Take 3 tablets (300 mg total) by mouth every evening. 90 tablet 2    aspirin (ECOTRIN) 81 MG EC tablet Take 1 tablet (81 mg total) by mouth once daily.  0    [DISCONTINUED] lamotrigine (LAMICTAL) 25 MG tablet Take 1 tablet (25 mg total) by mouth once daily. 30 tablet 6     No facility-administered encounter medications on file as of 5/13/2019.          PHYSICAL EXAMINATION:    The patient generally appears in good health, is appropriately interactive, and is in no apparent distress.    Skin: No lesions.    Mental: Cooperative with normal affect.    Neuro: Grossly  intact.    HEENT: Normal. No evidence of lymphadenopathy.    Chest:  normal inspiratory effort.    Abdomen:  Soft, non-tender. No masses or organomegaly. Bladder is not palpable. No evidence of flank discomfort. No evidence of inguinal hernia.    Extremities: No clubbing, cyanosis, or edema      LABS:    Lab Results   Component Value Date    BUN 6 (L) 02/23/2019    CREATININE 0.7 02/23/2019     IMPRESSION:    Encounter Diagnoses   Name Primary?    Mixed stress and urge urinary incontinence Yes    Cardiomyopathy, unspecified type     Neurogenic bladder     Suprapubic catheter     Urinary retention        PLAN:    1.  Appointment made with Dr. Fay Powers for clearance.  Discussed could consider an autologous obstructing sling to help with the stress component.  Needs clearance for a general anesthetic for about 1 hour.  2.  If cannot be cleared, then consider sedation with Botox and injection of a urethral bulking agent again. With removal of the bladder debris.     Copy to:  Dr. Adrian Brennan

## 2019-05-17 ENCOUNTER — TELEPHONE (OUTPATIENT)
Dept: INTERNAL MEDICINE | Facility: CLINIC | Age: 64
End: 2019-05-17

## 2019-05-17 NOTE — TELEPHONE ENCOUNTER
Spoke with pt, she has been taken off Xanax couple of month ago and states that she is having a lots of anxiety and stress.

## 2019-05-17 NOTE — TELEPHONE ENCOUNTER
Please call her back.  I can't give her that medication, as it interacts with her norco and her indwelling pain pump.  She has trazodone, seroquel, and wellbutrin that help with her anxiety.

## 2019-05-17 NOTE — TELEPHONE ENCOUNTER
----- Message from Jailene Christian sent at 5/17/2019 10:48 AM CDT -----  Contact: Patient [phone]      ----- Message -----  From: Arely Trimble  Sent: 5/17/2019   9:38 AM  To: [facility] Staff    Prescription Request:     Name of medication: See Symptoms    Reason for request: Anxiety and stress level are high/not sleeping well    [facility]: [id number] [place] [facility] [address] [address] [facility], [address]    Please advise.    Thank You

## 2019-05-23 ENCOUNTER — TELEPHONE (OUTPATIENT)
Dept: UROLOGY | Facility: CLINIC | Age: 64
End: 2019-05-23

## 2019-05-23 ENCOUNTER — OFFICE VISIT (OUTPATIENT)
Dept: CARDIOLOGY | Facility: CLINIC | Age: 64
End: 2019-05-23
Payer: MEDICARE

## 2019-05-23 VITALS
HEIGHT: 64 IN | BODY MASS INDEX: 27.59 KG/M2 | DIASTOLIC BLOOD PRESSURE: 54 MMHG | SYSTOLIC BLOOD PRESSURE: 98 MMHG | WEIGHT: 161.63 LBS | OXYGEN SATURATION: 98 % | HEART RATE: 59 BPM

## 2019-05-23 DIAGNOSIS — N39.46 MIXED STRESS AND URGE URINARY INCONTINENCE: Primary | ICD-10-CM

## 2019-05-23 DIAGNOSIS — I87.2 CHRONIC VENOUS INSUFFICIENCY: ICD-10-CM

## 2019-05-23 DIAGNOSIS — I25.10 CORONARY ARTERY DISEASE INVOLVING NATIVE CORONARY ARTERY OF NATIVE HEART WITHOUT ANGINA PECTORIS: Primary | Chronic | ICD-10-CM

## 2019-05-23 DIAGNOSIS — Z01.810 PREOP CARDIOVASCULAR EXAM: ICD-10-CM

## 2019-05-23 PROCEDURE — 99214 OFFICE O/P EST MOD 30 MIN: CPT | Mod: HCNC,S$GLB,, | Performed by: INTERNAL MEDICINE

## 2019-05-23 PROCEDURE — 99999 PR PBB SHADOW E&M-EST. PATIENT-LVL IV: ICD-10-PCS | Mod: PBBFAC,,, | Performed by: INTERNAL MEDICINE

## 2019-05-23 PROCEDURE — 3008F PR BODY MASS INDEX (BMI) DOCUMENTED: ICD-10-PCS | Mod: HCNC,CPTII,S$GLB, | Performed by: INTERNAL MEDICINE

## 2019-05-23 PROCEDURE — 99999 PR PBB SHADOW E&M-EST. PATIENT-LVL IV: CPT | Mod: PBBFAC,,, | Performed by: INTERNAL MEDICINE

## 2019-05-23 PROCEDURE — 3008F BODY MASS INDEX DOCD: CPT | Mod: HCNC,CPTII,S$GLB, | Performed by: INTERNAL MEDICINE

## 2019-05-23 PROCEDURE — 99214 PR OFFICE/OUTPT VISIT, EST, LEVL IV, 30-39 MIN: ICD-10-PCS | Mod: HCNC,S$GLB,, | Performed by: INTERNAL MEDICINE

## 2019-05-23 NOTE — LETTER
May 23, 2019      Shireen Mayo MD  [address]  [facility] [address]           [facility]  [address]  [facility] [address]  Phone: [phone]          Patient: Tasha Hawley   MR Number: [id number]   YOB: 1955   Date of Visit: 5/23/2019       Dear Dr. Shireen Mayo:    Thank you for referring Tasha Hawley to me for evaluation. Attached you will find relevant portions of my assessment and plan of care.    If you have questions, please do not hesitate to call me. I look forward to following Tasha Hawley along with you.    Sincerely,    Fay Powers MD    Enclosure  CC:  No Recipients    If you would like to receive this communication electronically, please contact [email or web address] or [phone] to request more information on [employer] access.    For providers and/or their staff who would like to refer a patient to [facility], please contact us through our one-stop-shop provider referral line, [facility], at [phone].    If you feel you have received this communication in error or would no longer like to receive these types of communications, please e-mail [email or web address]

## 2019-05-23 NOTE — PROGRESS NOTES
Subjective:   Patient ID:  Tasha Hawley is a 63 y.o. female who presents for follow-up of Cardiomyopathy, unspecified type      HPI: She presents for pre-operative risk assessment prior to undergoing bladder surgery. Tasha Hawley denies any chest pain, shortness of breath, PND, orthopnea, palpitations,  or syncope. She has a known occlusion of her LAD which was unamenable to PCI and has right to left collaterals. She had a recent echo done 2/19 which showed preserved LV function with an LVEF of 55%. Cartoid Us 11/17 20-39% bilateral stenosis.    ECG (2/23/19); NSR 90 bpm      Past Medical History:   Diagnosis Date    Anticoagulant long-term use     Anxiety     Arthritis     Bilateral lower extremity edema     severe chronic    Carotid artery occlusion     Cataract     Coronary artery disease     subtotalled LAD with collateral    Depression     Fever blister     Hypothyroid     Iron deficiency anemia     Lumbar radiculopathy     with chronic pain    Ocular migraine     Sleep apnea     cpap       Past Surgical History:   Procedure Laterality Date    ADENOIDECTOMY      BACK SURGERY      x 12    BIOPSY-BLADDER N/A 3/20/2018    Performed by [facility]ireen Mayo MD at [facility] OR 1ST FLR    BIOPSY-BLADDER N/A 1/26/2017    Performed by [facility] at [facility] OR 1ST FLR    CARDIAC CATHETERIZATION  2016    subtotalled LAD with right to left collaterals    CATARACT EXTRACTION W/  INTRAOCULAR LENS IMPLANT Left     Dr Coleman     CYSTOSCOPY N/A 3/20/2018    Performed by [facility] at [facility] OR 1ST FLR    CYSTOSCOPY N/A 1/26/2017    Performed by [facility] at [facility] OR 1ST FLR    CYSTOSCOPY N/A 11/8/2016    Performed by [facility]en [facility] MD at [facility] OR 2ND FLR    DISCECTOMY, SPINE, CERVICAL, ANTERIOR APPROACH, WITH FUSION N/A 5/13/2013    Performed by Larry Martinez MD at [facility] OR 2ND FLR    ESOPHAGOGASTRODUODENOSCOPY (EGD) N/A 5/23/2018    Performed by Prince Vance MD  at [facility] ENDO (4TH FLR)    ESOPHAGOGASTRODUODENOSCOPY (EGD) N/A 7/21/2017    Performed by Prince Vance MD at [facility] ENDO (4TH FLR)    ESOPHAGOGASTRODUODENOSCOPY (EGD) w/ dilation N/A 8/28/2017    Performed by Prince Vance MD at [facility] ENDO (4TH FLR)    FULGURATION-BLADDER N/A 1/26/2017    Performed by Shireen Mayo MD at [facility] OR 1ST FLR    HEART CATH-LEFT Left 1/7/2016    Performed by Alberto Gee MD at [facility] CATH LAB    HYSTERECTOMY  1975    endometriosis    INJECTION-BOTOX N/A 3/20/2018    Performed by Shireen Mayo MD at [facility] OR 1ST FLR    INJECTION-BOTOX N/A 1/26/2017    Performed by Shireen Mayo MD at [facility] OR 1ST FLR    INJECTION-BULKING AGENT URETHRAL  OUTLET N/A 3/20/2018    Performed by Shireen Mayo MD at [facility] OR 1ST FLR    INSERTION-INTRAOCULAR LENS (IOL) Left 11/13/2014    Performed by Sanjana Coleman MD at [facility] OR 1ST FLR    INSERTION-SUPRAPUBIC TUBE-OPEN N/A 11/8/2016    Performed by Shireen Mayo MD at [facility] OR 2ND FLR    MANOMETRY-ESOPHAGEAL-WITH IMPEDANCE N/A 4/23/2018    Performed by Robert [facility] at [facility] (4TH FLR)    MYELOGRAM N/A 2/19/2013    Performed by [facility] Provider at [facility] OR 2ND FLR    pain pump placement      SQ Dilaudid Pump managed by Dr. Hillman, Pain Management    PHACOEMULSIFICATION-ASPIRATION-CATARACT Left 11/13/2014    Performed by Sanjana Coleman MD at [facility] OR 1ST FLR    MA UPPER GI ENDOSCOPY,DIAGNOSIS [34708 (CPT®)] N/A 7/16/2014    Performed by Prince Vance MD at [facility] ENDO (4TH FLR)    SPINAL CORD STIMULATOR REMOVAL      before Larissa    SPINE SURGERY  5-13-13    CERVICAL FUSION    TONSILLECTOMY         Social History     Socioeconomic History    Marital status:      Spouse name: Not on file    Number of children: Not on file    Years of education: Not on file    Highest education level: Not on file   Occupational History    Not on file   Social Needs    Financial resource strain: Not on file     Food insecurity:     Worry: Not on file     Inability: Not on file    Transportation needs:     Medical: Not on file     Non-medical: Not on file   Tobacco Use    Smoking status: Never Smoker    Smokeless tobacco: Never Used   Substance and Sexual Activity    Alcohol use: Yes    Drug use: No    Sexual activity: Never   Lifestyle    Physical activity:     Days per week: Not on file     Minutes per session: Not on file    Stress: Not on file   Relationships    Social connections:     Talks on phone: Not on file     Gets together: Not on file     Attends Christianity service: Not on file     Active member of club or organization: Not on file     Attends meetings of clubs or organizations: Not on file     Relationship status: Not on file   Other Topics Concern    Are you pregnant or think you may be? Not Asked    Breast-feeding Not Asked   Social History Narrative    Not on file       Family History   Problem Relation Age of Onset    Cancer Mother 55        breast    Cancer Father         esophagus,had laryngectomy    Esophageal cancer Father     Parkinsonism Maternal Grandmother     Tremor Maternal Grandmother     No Known Problems Brother     No Known Problems Brother     Heart disease Maternal Uncle     No Known Problems Sister     No Known Problems Maternal Aunt     No Known Problems Paternal Aunt     No Known Problems Paternal Uncle     No Known Problems Maternal Grandfather     No Known Problems Paternal Grandmother     No Known Problems Paternal Grandfather     Melanoma Neg Hx     Amblyopia Neg Hx     Blindness Neg Hx     Cataracts Neg Hx     Diabetes Neg Hx     Glaucoma Neg Hx     Hypertension Neg Hx     Macular degeneration Neg Hx     Retinal detachment Neg Hx     Strabismus Neg Hx     Stroke Neg Hx     Thyroid disease Neg Hx     Colon cancer Neg Hx        Patient's Medications   New Prescriptions    No medications on file   Previous Medications    ASPIRIN (ECOTRIN) 81  MG EC TABLET    Take 1 tablet (81 mg total) by mouth once daily.    ATORVASTATIN (LIPITOR) 80 MG TABLET    TAKE 1 TABLET BY MOUTH DAILY    BD POSIFLUSH NORMAL SALINE 0.9 INJECTION        BUPROPION (WELLBUTRIN XL) 300 MG 24 HR TABLET    Taper to off:  1/2 tab daily for a week, then 1/2 tab every other day for a week, then stop    BUTALBITAL-ACETAMINOPHEN-CAFFEINE -40 MG (FIORICET, ESGIC) -40 MG PER TABLET        CEFEPIME (MAXIPIME) 2 GRAM INJECTION        CLOPIDOGREL (PLAVIX) 75 MG TABLET    Take 75 mg by mouth once daily.    DOCUSATE SODIUM (COLACE) 100 MG CAPSULE    Take 1 capsule (100 mg total) by mouth 3 (three) times daily as needed for constipation.    FLUOXETINE 20 MG CAPSULE    TAKE THREE CAPSULES BY MOUTH DAILY    FUROSEMIDE (LASIX) 40 MG TABLET    Take 1 tablet (40 mg total) by mouth once daily.    HEPARIN LOCKFLUSH,PORCINE,,PF, 10 UNIT/ML SYRG        HYDROCODONE-ACETAMINOPHEN 10-325MG (NORCO)  MG TAB    Take 2 tablets by mouth every 4 (four) hours as needed for Pain.     LEVOTHYROXINE (SYNTHROID) 125 MCG TABLET    Take 1 tablet (125 mcg total) by mouth before breakfast.    LIDOCAINE (LIDODERM) 5 %    APPLY 1 PATCH DAILY AND WEAR FOR A MAXIMUM OF 12 HOURS    OXYBUTYNIN (DITROPAN XL) 15 MG TR24    Take 1 tablet (15 mg total) by mouth once daily.    PANTOPRAZOLE (PROTONIX) 40 MG TABLET    Take 1 tablet (40 mg total) by mouth once daily.    POTASSIUM CHLORIDE SA (K-DUR,KLOR-CON) 20 MEQ TABLET    Take 20 mEq by mouth 2 (two) times daily.    POTASSIUM CITRATE (UROCIT-K) 10 MEQ (1,080 MG) TBSR    Take 1 tablet (10 mEq total) by mouth 3 (three) times daily with meals.    QUETIAPINE (SEROQUEL) 100 MG TAB    Take 1 tablet (100 mg total) by mouth every evening.    RANITIDINE (ZANTAC) 150 MG CAPSULE    Take 1 capsule (150 mg total) by mouth 2 (two) times daily.    SENNOSIDES (SENNA LAX ORAL)    Take 20 mg by mouth every evening.     TRAZODONE (DESYREL) 100 MG TABLET    Take 3 tablets (300 mg total)  "by mouth every evening.   Modified Medications    No medications on file   Discontinued Medications    No medications on file       Review of Systems   Constitution: Negative for malaise/fatigue and weight gain.   HENT: Negative for hearing loss.    Eyes: Negative for visual disturbance.   Cardiovascular: Positive for leg swelling. Negative for chest pain, claudication, dyspnea on exertion, near-syncope, orthopnea, palpitations, paroxysmal nocturnal dyspnea and syncope.   Respiratory: Negative for cough, shortness of breath, sleep disturbances due to breathing, snoring and wheezing.    Endocrine: Negative for cold intolerance, heat intolerance, polydipsia, polyphagia and polyuria.   Hematologic/Lymphatic: Negative for bleeding problem. Does not bruise/bleed easily.   Skin: Negative for rash and suspicious lesions.   Musculoskeletal: Positive for arthritis and back pain. Negative for falls, joint pain, muscle weakness and myalgias.   Gastrointestinal: Negative for abdominal pain, change in bowel habit, constipation, diarrhea, heartburn, hematochezia, melena and nausea.   Genitourinary: Positive for bladder incontinence. Negative for hematuria and nocturia.   Neurological: Negative for excessive daytime sleepiness, dizziness, headaches, light-headedness, loss of balance and weakness.   Psychiatric/Behavioral: Negative for depression. The patient is not nervous/anxious.    Allergic/Immunologic: Negative for environmental allergies.       BP (!) 98/54 (BP Location: Left arm, Patient Position: Sitting, BP Method: Medium (Automatic))   Pulse (!) 59   Ht 5' 4" (1.626 m)   Wt 73.3 kg (161 lb 9.6 oz)   LMP  (LMP Unknown)   SpO2 98%   BMI 27.74 kg/m²     Objective:   Physical Exam   Constitutional: She is oriented to person, place, and time. She appears well-developed and well-nourished.   Examined in chair due to inability to get up onto the exam table.     HENT:   Head: Normocephalic and atraumatic.   Mouth/Throat: " Oropharynx is clear and moist.   Eyes: Pupils are equal, round, and reactive to light. Conjunctivae and EOM are normal. No scleral icterus.   Neck: Normal range of motion. Neck supple. No hepatojugular reflux and no JVD present. No tracheal deviation present. No thyromegaly present.   Cardiovascular: Normal rate, regular rhythm, normal heart sounds and intact distal pulses. PMI is not displaced.   Pulses:       Carotid pulses are 2+ on the right side, and 2+ on the left side.       Radial pulses are 2+ on the right side, and 2+ on the left side.        Dorsalis pedis pulses are 2+ on the right side, and 2+ on the left side.        Posterior tibial pulses are 2+ on the right side, and 2+ on the left side.   Pulmonary/Chest: Effort normal and breath sounds normal.   Chest wall tender to palpation left   Abdominal: Soft. Bowel sounds are normal. She exhibits no distension and no mass. There is no hepatosplenomegaly. There is no tenderness.   Suprapubic catheter in place   Musculoskeletal: She exhibits edema. She exhibits no tenderness.   Trace edema. Skin changes present consistent with chronic venous stasis changes.   Lymphadenopathy:     She has no cervical adenopathy.   Neurological: She is alert and oriented to person, place, and time.   Skin: Skin is warm and dry. No rash noted. No cyanosis or erythema. Nails show no clubbing.   Psychiatric: She has a normal mood and affect. Her speech is normal and behavior is normal.       Lab Results   Component Value Date     02/23/2019    K 4.0 02/23/2019     02/23/2019    CO2 24 02/23/2019    BUN 6 (L) 02/23/2019    CREATININE 0.7 02/23/2019     (H) 02/23/2019    HGBA1C 5.4 08/25/2016    MG 2.2 12/28/2018    AST 24 02/23/2019    ALT 19 02/23/2019    ALBUMIN 2.9 (L) 02/23/2019    PROT 7.2 02/23/2019    BILITOT 0.7 02/23/2019    WBC 9.34 02/23/2019    HGB 12.2 02/23/2019    HCT 38.2 02/23/2019    MCV 87 02/23/2019     02/23/2019    INR 0.9 05/27/2014     TSH 0.744 02/21/2019    CHOL 122 02/21/2019    HDL 47 02/21/2019    LDLCALC 63.8 02/21/2019    TRIG 56 02/21/2019     (H) 02/23/2019       Assessment:     1. Coronary artery disease involving native coronary artery of native heart without angina pectoris : He is asymptomatic with preserved LVEF. Continue asa and high intensity statin therapy.   2. Preop cardiovascular exam : She has one clinical risk factor (CAD) according the RCRI placing her at low risk for a perioperative cardiac complication. She should remain on her statin during the perioperative period. ASA can be held one week prior to surgery if needed.    3. Chronic venous insufficiency : Leg elevation and compression stockings recommended.       Plan:     Tasha was seen today for cardiomyopathy, unspecified type.    Diagnoses and all orders for this visit:    Coronary artery disease involving native coronary artery of native heart without angina pectoris    Preop cardiovascular exam    Chronic venous insufficiency        Thank you for allowing me to participate in this patient's care. Please do not hesitate to contact me with any questions or concerns.

## 2019-05-23 NOTE — TELEPHONE ENCOUNTER
----- Message from Christi Wang LPN sent at 5/23/2019  1:33 PM CDT -----  Contact: pt: [phone]      ----- Message -----  From: Sis Calixto  Sent: 5/23/2019  12:08 PM  To: Maria Esther Iyer Staff    Needs Advice    Reason for call: pt called to notify Dr. Mayo that she has been approved by Dr. Powers for surgery and would like to know what's next to scheduling surgery         Communication Preference: pt: [phone]

## 2019-05-28 ENCOUNTER — OFFICE VISIT (OUTPATIENT)
Dept: INTERNAL MEDICINE | Facility: CLINIC | Age: 64
End: 2019-05-28
Payer: MEDICARE

## 2019-05-28 ENCOUNTER — TELEPHONE (OUTPATIENT)
Dept: INFECTIOUS DISEASES | Facility: CLINIC | Age: 64
End: 2019-05-28

## 2019-05-28 ENCOUNTER — DOCUMENTATION ONLY (OUTPATIENT)
Dept: INTERNAL MEDICINE | Facility: CLINIC | Age: 64
End: 2019-05-28

## 2019-05-28 VITALS
OXYGEN SATURATION: 96 % | WEIGHT: 155 LBS | BODY MASS INDEX: 26.61 KG/M2 | HEART RATE: 53 BPM | SYSTOLIC BLOOD PRESSURE: 100 MMHG | DIASTOLIC BLOOD PRESSURE: 58 MMHG

## 2019-05-28 DIAGNOSIS — R50.81 FEVER IN OTHER DISEASES: ICD-10-CM

## 2019-05-28 DIAGNOSIS — F41.1 GENERALIZED ANXIETY DISORDER: ICD-10-CM

## 2019-05-28 DIAGNOSIS — G47.00 INSOMNIA, UNSPECIFIED TYPE: ICD-10-CM

## 2019-05-28 DIAGNOSIS — G25.3 MYOCLONIC JERKING: ICD-10-CM

## 2019-05-28 DIAGNOSIS — R13.19 ESOPHAGEAL DYSPHAGIA: ICD-10-CM

## 2019-05-28 DIAGNOSIS — Z12.11 SCREENING FOR MALIGNANT NEOPLASM OF COLON: ICD-10-CM

## 2019-05-28 DIAGNOSIS — I95.9 HYPOTENSION, UNSPECIFIED HYPOTENSION TYPE: ICD-10-CM

## 2019-05-28 DIAGNOSIS — E03.9 PRIMARY HYPOTHYROIDISM: Chronic | ICD-10-CM

## 2019-05-28 DIAGNOSIS — N30.00 ACUTE CYSTITIS WITHOUT HEMATURIA: ICD-10-CM

## 2019-05-28 DIAGNOSIS — G89.4 CHRONIC PAIN SYNDROME: Chronic | ICD-10-CM

## 2019-05-28 DIAGNOSIS — K59.00 CONSTIPATION, UNSPECIFIED CONSTIPATION TYPE: Primary | ICD-10-CM

## 2019-05-28 DIAGNOSIS — F41.9 ANXIETY: ICD-10-CM

## 2019-05-28 DIAGNOSIS — R89.9 ABNORMAL LABORATORY TEST: Primary | ICD-10-CM

## 2019-05-28 PROCEDURE — 3008F PR BODY MASS INDEX (BMI) DOCUMENTED: ICD-10-PCS | Mod: HCNC,CPTII,S$GLB, | Performed by: INTERNAL MEDICINE

## 2019-05-28 PROCEDURE — 99999 PR PBB SHADOW E&M-EST. PATIENT-LVL III: CPT | Mod: PBBFAC,HCNC,, | Performed by: INTERNAL MEDICINE

## 2019-05-28 PROCEDURE — 99999 PR PBB SHADOW E&M-EST. PATIENT-LVL III: ICD-10-PCS | Mod: PBBFAC,HCNC,, | Performed by: INTERNAL MEDICINE

## 2019-05-28 PROCEDURE — 99214 OFFICE O/P EST MOD 30 MIN: CPT | Mod: HCNC,S$GLB,, | Performed by: INTERNAL MEDICINE

## 2019-05-28 PROCEDURE — 3008F BODY MASS INDEX DOCD: CPT | Mod: HCNC,CPTII,S$GLB, | Performed by: INTERNAL MEDICINE

## 2019-05-28 PROCEDURE — 99214 PR OFFICE/OUTPT VISIT, EST, LEVL IV, 30-39 MIN: ICD-10-PCS | Mod: HCNC,S$GLB,, | Performed by: INTERNAL MEDICINE

## 2019-05-28 RX ORDER — PANTOPRAZOLE SODIUM 40 MG/1
40 TABLET, DELAYED RELEASE ORAL DAILY
Qty: 90 TABLET | Refills: 3 | Status: SHIPPED | OUTPATIENT
Start: 2019-05-28 | End: 2019-11-07 | Stop reason: SDUPTHER

## 2019-05-28 RX ORDER — FLUOXETINE HYDROCHLORIDE 20 MG/1
60 CAPSULE ORAL DAILY
Qty: 270 CAPSULE | Refills: 3 | Status: SHIPPED | OUTPATIENT
Start: 2019-05-28 | End: 2019-06-11 | Stop reason: SDUPTHER

## 2019-05-28 RX ORDER — DOCUSATE SODIUM 100 MG/1
100 CAPSULE, LIQUID FILLED ORAL 2 TIMES DAILY PRN
Qty: 180 CAPSULE | Refills: 3 | Status: SHIPPED | OUTPATIENT
Start: 2019-05-28 | End: 2019-07-26

## 2019-05-28 RX ORDER — ACETAMINOPHEN 500 MG
5000 TABLET ORAL DAILY
Status: ON HOLD | COMMUNITY
End: 2020-01-28

## 2019-05-28 RX ORDER — PROMETHAZINE HYDROCHLORIDE 25 MG/1
25 TABLET ORAL EVERY 6 HOURS PRN
COMMUNITY

## 2019-05-28 RX ORDER — FUROSEMIDE 40 MG/1
40 TABLET ORAL DAILY
Qty: 90 TABLET | Refills: 3 | Status: SHIPPED | OUTPATIENT
Start: 2019-05-28 | End: 2019-08-12 | Stop reason: CLARIF

## 2019-05-28 RX ORDER — QUETIAPINE FUMARATE 100 MG/1
100 TABLET, FILM COATED ORAL NIGHTLY
Qty: 90 TABLET | Refills: 3 | Status: SHIPPED | OUTPATIENT
Start: 2019-05-28 | End: 2019-06-11 | Stop reason: SDUPTHER

## 2019-05-28 RX ORDER — LEVOTHYROXINE SODIUM 125 UG/1
125 TABLET ORAL
Qty: 90 TABLET | Refills: 3 | Status: SHIPPED | OUTPATIENT
Start: 2019-05-28 | End: 2019-11-07 | Stop reason: SDUPTHER

## 2019-05-28 NOTE — TELEPHONE ENCOUNTER
----- Message from Corinna Biswas MA sent at 5/28/2019  3:06 PM CDT -----  Contact: Tasha   tel:   cell:    [phone]        ----- Message -----  From: Monik Euceda  Sent: 5/28/2019   2:40 PM  To: Mika Campbell Staff    Needs Advice    Reason for call:  Caller says she needs to speak to the dr. Ref. Her urine problems.          Communication Preference: phone     Additional Information:  pls call on the cell today.

## 2019-05-28 NOTE — TELEPHONE ENCOUNTER
Schedule blood cultures to be drawn at the lab in [place].  Let me know what time she will be going to have it done.  I want her to start some antibiotics after she has it done.

## 2019-05-28 NOTE — PROGRESS NOTES
Subjective:       Patient ID: Tasha Hawley is a 63 y.o. female.    Chief Complaint: Follow-up    HPI - routine f/u.  She feels as well as always.  Wants her xanax back; I won't give that in combination with her heavy narcotic regimen.  Constipation is pretty well managed right now.  She hasn't slept well in the past three days; having myoclonic jerks as a consequence of insomnia/fatigue.  Requested some refills.  Needs to do the FIT kit.  Not a smoker.  Has bladder leakage; suprapubic still in place.  Pending surgery to help with leakage.    PMH:  Chronic insomnia.  Chronic low back pain with narcotic use.  Indwelling narcotic pump  History CVA with dysarthria  Neurogenic bladder, with recurrent UTI's.  SP catheter placed Nov 2016  Hypothyroidism, stable  CECI, not on CPAP  CAD, with ACS in Jan 2016 - subtot mid LAD lesion without PCI; ischemic CM with EF 40% and chronic LE edema. Followed by [facility] cardiology  Anxiety and Depression  Chronic constipation, likely d/t ongoing narcotic use  Intermittent nausea  Improving LE edema     Meds: Reviewed and reconciled in EPIC with patient during visit today.     Review of Systems   Constitutional: Negative for fever.   HENT: Negative for congestion.    Respiratory: Negative for shortness of breath.    Cardiovascular: Positive for leg swelling.   Gastrointestinal: Positive for abdominal pain and constipation.   Genitourinary: Positive for enuresis.   Musculoskeletal: Positive for arthralgias and back pain.   Skin: Negative for rash.   Neurological: Negative for headaches.   Psychiatric/Behavioral: Positive for sleep disturbance.       Objective:      Physical Exam    Assessment:       1. Constipation, unspecified constipation type    2. Hypotension, unspecified hypotension type    3. Esophageal dysphagia    4. Primary hypothyroidism    5. Insomnia, unspecified type    6. Anxiety    7. Screening for malignant neoplasm of colon    8. Myoclonic jerking    9. Generalized  anxiety disorder    10. Chronic pain syndrome        Plan:       Tasha was seen today for follow-up.    Diagnoses and all orders for this visit:    Constipation, unspecified constipation type - stable as it ever is.  Refilling docusate  -     docusate sodium (COLACE) 100 MG capsule; Take 1 capsule (100 mg total) by mouth 2 (two) times daily as needed for Constipation.    Hypotension, unspecified hypotension type  -     furosemide (LASIX) 40 MG tablet; Take 1 tablet (40 mg total) by mouth once daily.    Esophageal dysphagia - stable.  Requested refill  -     pantoprazole (PROTONIX) 40 MG tablet; Take 1 tablet (40 mg total) by mouth once daily.    Primary hypothyroidism - stable.  Requested refill  -     levothyroxine (SYNTHROID) 125 MCG tablet; Take 1 tablet (125 mcg total) by mouth before breakfast.    Insomnia, unspecified type - stable; refilling seroquel  -     QUEtiapine (SEROQUEL) 100 MG Tab; Take 1 tablet (100 mg total) by mouth every evening.    Anxiety  -     FLUoxetine 20 MG capsule; Take 3 capsules (60 mg total) by mouth once daily.    Screening for malignant neoplasm of colon  -     Fecal Immunochemical Test (iFOBT); Future    Myoclonic jerking - only comes when fatigued.  Encouraged her to work on sleep hygiene    Generalized anxiety disorder    Chronic pain syndrome    rtc prn, or in 3 months    PAPO Hodges MD MPH  Staff Internist

## 2019-05-29 RX ORDER — AMOXICILLIN 875 MG/1
875 TABLET, FILM COATED ORAL EVERY 12 HOURS
Qty: 14 TABLET | Refills: 0 | Status: SHIPPED | OUTPATIENT
Start: 2019-05-29 | End: 2019-06-07

## 2019-05-29 RX ORDER — DOXYCYCLINE HYCLATE 100 MG
100 TABLET ORAL EVERY 12 HOURS
Qty: 14 TABLET | Refills: 0 | Status: SHIPPED | OUTPATIENT
Start: 2019-05-29 | End: 2019-06-07

## 2019-05-29 NOTE — TELEPHONE ENCOUNTER
After she completes the labs for blood cultures, she should start taking doxycycline and amoxicillin.  Prescriptions sent to her pharmacy.

## 2019-05-30 ENCOUNTER — LAB VISIT (OUTPATIENT)
Dept: LAB | Facility: HOSPITAL | Age: 64
End: 2019-05-30
Attending: INTERNAL MEDICINE
Payer: MEDICARE

## 2019-05-30 DIAGNOSIS — R50.81 FEVER IN OTHER DISEASES: ICD-10-CM

## 2019-05-30 DIAGNOSIS — R89.9 ABNORMAL LABORATORY TEST: ICD-10-CM

## 2019-05-30 PROCEDURE — 36415 COLL VENOUS BLD VENIPUNCTURE: CPT | Mod: HCNC

## 2019-05-30 PROCEDURE — 87040 BLOOD CULTURE FOR BACTERIA: CPT | Mod: 59,HCNC

## 2019-05-30 RX ORDER — DOXYCYCLINE HYCLATE 100 MG
100 TABLET ORAL EVERY 12 HOURS
Qty: 14 TABLET | Refills: 0 | Status: SHIPPED | OUTPATIENT
Start: 2019-05-30 | End: 2019-06-06

## 2019-05-30 RX ORDER — AMOXICILLIN 875 MG/1
875 TABLET, FILM COATED ORAL EVERY 12 HOURS
Qty: 14 TABLET | Refills: 0 | Status: SHIPPED | OUTPATIENT
Start: 2019-05-30 | End: 2019-06-06

## 2019-06-04 LAB
BACTERIA BLD CULT: NORMAL
BACTERIA BLD CULT: NORMAL

## 2019-06-06 DIAGNOSIS — N30.00 ACUTE CYSTITIS WITHOUT HEMATURIA: ICD-10-CM

## 2019-06-11 ENCOUNTER — OFFICE VISIT (OUTPATIENT)
Dept: PSYCHIATRY | Facility: CLINIC | Age: 64
End: 2019-06-11
Payer: MEDICARE

## 2019-06-11 VITALS
HEART RATE: 55 BPM | BODY MASS INDEX: 28.73 KG/M2 | DIASTOLIC BLOOD PRESSURE: 45 MMHG | HEIGHT: 64 IN | WEIGHT: 168.31 LBS | SYSTOLIC BLOOD PRESSURE: 95 MMHG

## 2019-06-11 DIAGNOSIS — F33.41 RECURRENT MAJOR DEPRESSION IN PARTIAL REMISSION: Primary | ICD-10-CM

## 2019-06-11 DIAGNOSIS — F41.9 ANXIETY: ICD-10-CM

## 2019-06-11 DIAGNOSIS — G47.00 INSOMNIA, UNSPECIFIED TYPE: ICD-10-CM

## 2019-06-11 PROCEDURE — 3008F PR BODY MASS INDEX (BMI) DOCUMENTED: ICD-10-PCS | Mod: HCNC,CPTII,S$GLB, | Performed by: PSYCHIATRY & NEUROLOGY

## 2019-06-11 PROCEDURE — 99499 RISK ADDL DX/OHS AUDIT: ICD-10-PCS | Mod: HCNC,S$GLB,, | Performed by: PSYCHIATRY & NEUROLOGY

## 2019-06-11 PROCEDURE — 99999 PR PBB SHADOW E&M-EST. PATIENT-LVL III: ICD-10-PCS | Mod: PBBFAC,HCNC,, | Performed by: PSYCHIATRY & NEUROLOGY

## 2019-06-11 PROCEDURE — 99214 PR OFFICE/OUTPT VISIT, EST, LEVL IV, 30-39 MIN: ICD-10-PCS | Mod: HCNC,S$GLB,, | Performed by: PSYCHIATRY & NEUROLOGY

## 2019-06-11 PROCEDURE — 99999 PR PBB SHADOW E&M-EST. PATIENT-LVL III: CPT | Mod: PBBFAC,HCNC,, | Performed by: PSYCHIATRY & NEUROLOGY

## 2019-06-11 PROCEDURE — 3008F BODY MASS INDEX DOCD: CPT | Mod: HCNC,CPTII,S$GLB, | Performed by: PSYCHIATRY & NEUROLOGY

## 2019-06-11 PROCEDURE — 99499 UNLISTED E&M SERVICE: CPT | Mod: HCNC,S$GLB,, | Performed by: PSYCHIATRY & NEUROLOGY

## 2019-06-11 PROCEDURE — 99214 OFFICE O/P EST MOD 30 MIN: CPT | Mod: HCNC,S$GLB,, | Performed by: PSYCHIATRY & NEUROLOGY

## 2019-06-11 RX ORDER — FLUOXETINE HYDROCHLORIDE 20 MG/1
60 CAPSULE ORAL DAILY
Qty: 270 CAPSULE | Refills: 3 | Status: SHIPPED | OUTPATIENT
Start: 2019-06-11 | End: 2019-09-10 | Stop reason: SDUPTHER

## 2019-06-11 RX ORDER — TRAZODONE HYDROCHLORIDE 100 MG/1
300 TABLET ORAL NIGHTLY
Qty: 270 TABLET | Refills: 1 | Status: SHIPPED | OUTPATIENT
Start: 2019-06-11 | End: 2019-09-10 | Stop reason: SDUPTHER

## 2019-06-11 RX ORDER — QUETIAPINE FUMARATE 100 MG/1
100 TABLET, FILM COATED ORAL NIGHTLY
Qty: 90 TABLET | Refills: 3 | Status: SHIPPED | OUTPATIENT
Start: 2019-06-11 | End: 2019-09-10 | Stop reason: SDUPTHER

## 2019-06-11 NOTE — PROGRESS NOTES
"Outpatient Psychiatry Follow-Up Visit (MD/NP)    6/11/2019    Clinical Status of Patient:  Outpatient (Ambulatory)    Chief Complaint:  Tasha Hawley is a 63 y.o. female who presents today for follow-up of depression and anxiety.  Met with patient.      Interval History and Content of Current Session:  Interim Events/Subjective Report/Content of Current Session: Her main complaint is insomnia.  She denies napping, alcohol, or caffeinated drinks.  She has chronic pain and it is hard for her to get comfortable in bed.  No complaints about meds.  Bladder surgery is pending.    Psychotherapy:  · Target symptoms: depression, anxiety   · Why chosen therapy is appropriate versus another modality: relevant to diagnosis  · Outcome monitoring methods: self-report, observation  · Therapeutic intervention type: supportive psychotherapy  · Topics discussed/themes: stress related to medical comorbidities, building skills sets for symptom management, symptom recognition  · The patient's response to the intervention is accepting. The patient's progress toward treatment goals is good.   · Duration of intervention: 10 minutes.    Review of Systems   · PSYCHIATRIC: Pertinant items are noted in the narrative.    Past Medical, Family and Social History: The patient's past medical, family and social history have been reviewed and updated as appropriate within the electronic medical record - see encounter notes.    Compliance: yes    Side effects: None    Risk Parameters:  Patient reports no suicidal ideation  Patient reports no homicidal ideation  Patient reports no self-injurious behavior  Patient reports no violent behavior    Exam (detailed: at least 9 elements; comprehensive: all 15 elements)   Constitutional  Vitals:  Most recent vital signs, dated less than 90 days prior to this appointment, were reviewed.   Vitals:    06/11/19 1038   BP: (!) 95/45   Pulse: (!) 55   Weight: 76.4 kg (168 lb 5.1 oz)   Height: 5' 4" (1.626 m) "        General:  unremarkable, age appropriate     Musculoskeletal  Muscle Strength/Tone:  no tremor   Gait & Station:  uses walker     Psychiatric  Speech:  no latency; no press   Mood & Affect:  anxious  congruent and appropriate   Thought Process:  normal and logical   Associations:  intact   Thought Content:  normal, no suicidality, no homicidality, delusions, or paranoia   Insight:  intact   Judgement: behavior is adequate to circumstances   Orientation:  grossly intact   Memory: intact for content of interview   Language: grossly intact   Attention Span & Concentration:  able to focus   Fund of Knowledge:  intact and appropriate to age and level of education     Assessment and Diagnosis   Status/Progress: Based on the examination today, the patient's problem(s) is/are adequately but not ideally controlled.  New problems have not been presented today.   Co-morbidities are complicating management of the primary condition.  There are no active rule-out diagnoses for this patient at this time.     General Impression: Depression well controlled      ICD-10-CM ICD-9-CM   1. Recurrent major depression in partial remission F33.41 296.35   2. Insomnia, unspecified type G47.00 780.52   3. Anxiety F41.9 300.00       Intervention/Counseling/Treatment Plan   · Medication Management: Continue current medications.      Return to Clinic: 3 months

## 2019-06-17 NOTE — PROGRESS NOTES
CHIEF COMPLAINT:    Mrs. Hawley is a 63 y.o. female presenting to sign consents in a patient with mixed urinary incontinence, recurrent UTI, status post suprapubic tube    PRESENTING ILLNESS:    Tasha Hawley is a 63 y.o. female who returns accompanied by her .  She has mixed incontinence in spite of having a suprapubic tube, she clearly gives a history of stress incontinence.  She has some debris in the bladder from a CT scan in February.  Suparpubic catheter was changed 1 week ago.  She states she lost about 9 lbs of water weight recently.    She has undergone cystoscopy, Botox injection of the bladder and  Macroplastique with some improvement but it wore off.      REVIEW OF SYSTEMS:    Review of Systems   Constitutional: Negative.    HENT: Negative.    Eyes: Negative.    Respiratory: Negative.    Cardiovascular: Positive for leg swelling (chronic).   Gastrointestinal: Negative.    Genitourinary: Negative.         Mixed incontinence   Musculoskeletal: Positive for back pain.   Skin: Negative.    Neurological: Positive for weakness.   Endo/Heme/Allergies: Negative.    Psychiatric/Behavioral: Negative.        PATIENT HISTORY:    Past Medical History:   Diagnosis Date    Anticoagulant long-term use     Anxiety     Arthritis     Bilateral lower extremity edema     severe chronic    Carotid artery occlusion     Cataract     Coronary artery disease     subtotalled LAD with collateral    Depression     Fever blister     Hypothyroid     Iron deficiency anemia     Lumbar radiculopathy     with chronic pain    Ocular migraine     Sleep apnea     cpap       Past Surgical History:   Procedure Laterality Date    ADENOIDECTOMY      BACK SURGERY      x 12    BIOPSY-BLADDER N/A 3/20/2018    [facility] OR 1ST FLR    BIOPSY-BLADDER N/A 1/26/2017    [facility] at [facility] OR 1ST FLR    CARDIAC CATHETERIZATION  2016    subtotalled LAD with right to  left collaterals    CATARACT EXTRACTION W/  INTRAOCULAR LENS IMPLANT Left     Dr Coleman     CYSTOSCOPY N/A 3/20/2018    [facility] at [facility] OR 1ST FLR    CYSTOSCOPY N/A 1/26/2017    [facility] by [facility] at [facility] OR 1ST FLR    CYSTOSCOPY N/A 11/8/2016    Performed by [facility] at [facility] OR 2ND FLR    DISCECTOMY, SPINE, CERVICAL, ANTERIOR APPROACH, WITH FUSION N/A 5/13/2013    Performed by Larry Martinez MD at [facility] OR 2ND FLR    ESOPHAGOGASTRODUODENOSCOPY (EGD) N/A 5/23/2018    Performed by Prince Vance MD at [facility] ENDO (4TH FLR)    ESOPHAGOGASTRODUODENOSCOPY (EGD) N/A 7/21/2017    Performed by Prince [facility]ance MD at [facility] ENDO (4TH FLR)    ESOPHAGOGASTRODUODENOSCOPY (EGD) w/ dilation N/A 8/28/2017    Performed by Prince [facility]ance MD at [facility] ENDO (4TH FLR)    FULGURATION-BLADDER N/A 1/26/2017    Performed by [facility] at [facility] OR 1ST FLR    HEART CATH-LEFT Left 1/7/2016    Performed by Alberto Gee MD at [facility] CATH LAB    HYSTERECTOMY  1975    endometriosis    INJECTION-BOTOX N/A 3/20/2018    Performed by [facility] at [facility] OR 1ST FLR    INJECTION-BOTOX N/A 1/26/2017    Performed by [facility] at [facility] OR 1ST FLR    INJECTION-BULKING AGENT URETHRAL  OUTLET N/A 3/20/2018    Performed by [facility] at [facility] OR 1ST FLR    INSERTION-INTRAOCULAR LENS (IOL) Left 11/13/2014    Performed by Sanjana Coleman MD at [facility] OR 1ST FLR    INSERTION-SUPRAPUBIC TUBE-OPEN N/A 11/8/2016    Performed by [facility] at [facility] OR 2ND FLR    MANOMETRY-ESOPHAGEAL-WITH IMPEDANCE N/A 4/23/2018    Performed by Robert Menendez MD at [facility] ENDO (4TH FLR)    MYELOGRAM N/A 2/19/2013    Performed by [facility] Provider at [facility] OR 2ND FLR    pain pump placement      SQ Dilaudid Pump managed by Dr. Hillman, Pain Management    PHACOEMULSIFICATION-ASPIRATION-CATARACT Left 11/13/2014    Performed by Sanjana Coleman MD at [facility] OR  1ST FLR    WI UPPER GI ENDOSCOPY,DIAGNOSIS [26869 (CPT®)] N/A 7/16/2014    Performed by [facility] at [facility] (4TH FLR)    SPINAL CORD STIMULATOR REMOVAL      before [place]    SPINE SURGERY  5-13-13    CERVICAL FUSION    TONSILLECTOMY         Family History   Problem Relation Age of Onset    Cancer Mother 55        breast    Cancer Father         esophagus,had laryngectomy    Esophageal cancer Father     Parkinsonism Maternal Grandmother     Tremor Maternal Grandmother     Heart disease Maternal Uncle      Socioeconomic History    Marital status:    Tobacco Use    Smoking status: Never Smoker    Smokeless tobacco: Never Used   Substance and Sexual Activity    Alcohol use: Yes    Drug use: No    Sexual activity: Never       Allergies:  (d)-limonene flavor; Bactrim [sulfamethoxazole-trimethoprim]; Benadryl [diphenhydramine hcl]; Imitrex [sumatriptan succinate]; Topamax [topiramate]; Vancomycin; Butorphanol tartrate; Darvocet a500 [propoxyphene n-acetaminophen]; Fentanyl; White petrolatum-zinc; Zinc oxide-white petrolatum; Latex, natural rubber; and Phenytoin    Medications:  Outpatient Encounter Medications as of 6/20/2019   Medication Sig Dispense Refill    atorvastatin (LIPITOR) 80 MG tablet TAKE 1 TABLET BY MOUTH DAILY 90 tablet 3    butalbital-acetaminophen-caffeine -40 mg (FIORICET, ESGIC) -40 mg per tablet       cholecalciferol, vitamin D3, (VITAMIN D3) 5,000 unit Tab Take 5,000 Units by mouth once daily.      clopidogrel (PLAVIX) 75 mg tablet Take 75 mg by mouth once daily.      docusate sodium (COLACE) 100 MG capsule Take 1 capsule (100 mg total) by mouth 2 (two) times daily as needed for Constipation. 180 capsule 3    FLUoxetine 20 MG capsule Take 3 capsules (60 mg total) by mouth once daily. 270 capsule 3    furosemide (LASIX) 40 MG tablet Take 1 tablet (40 mg total) by mouth once daily. (Patient taking differently: Take 40 mg by mouth 2 (two) times  daily. ) 90 tablet 3    hydrocodone-acetaminophen 10-325mg (NORCO)  mg Tab Take 2 tablets by mouth every 4 (four) hours as needed for Pain.       levothyroxine (SYNTHROID) 125 MCG tablet Take 1 tablet (125 mcg total) by mouth before breakfast. 90 tablet 3    lidocaine (LIDODERM) 5 % APPLY 1 PATCH DAILY AND WEAR FOR A MAXIMUM OF 12 HOURS  5    oxybutynin (DITROPAN XL) 15 MG TR24 Take 1 tablet (15 mg total) by mouth once daily. 30 tablet 11    pantoprazole (PROTONIX) 40 MG tablet Take 1 tablet (40 mg total) by mouth once daily. 90 tablet 3    potassium citrate (UROCIT-K) 10 mEq (1,080 mg) TbSR Take 1 tablet (10 mEq total) by mouth 3 (three) times daily with meals. 90 tablet 11    promethazine (PHENERGAN) 25 MG tablet Take 25 mg by mouth every 6 (six) hours as needed for Nausea.      QUEtiapine (SEROQUEL) 100 MG Tab Take 1 tablet (100 mg total) by mouth every evening. 90 tablet 3    SENNOSIDES (SENNA LAX ORAL) Take 20 mg by mouth every evening.       traZODone (DESYREL) 100 MG tablet Take 3 tablets (300 mg total) by mouth every evening. 270 tablet 1    aspirin (ECOTRIN) 81 MG EC tablet Take 1 tablet (81 mg total) by mouth once daily.  0    [DISCONTINUED] lamotrigine (LAMICTAL) 25 MG tablet Take 1 tablet (25 mg total) by mouth once daily. 30 tablet 6     No facility-administered encounter medications on file as of 6/20/2019.          PHYSICAL EXAMINATION:    The patient generally appears in good health, is appropriately interactive, and is in no apparent distress.    Skin: No lesions.    Mental: Cooperative with normal affect.    Neuro: Grossly intact.    HEENT: Normal. No evidence of lymphadenopathy.    Chest:  normal inspiratory effort.    Abdomen:  Soft, non-tender. No masses or organomegaly. Bladder is not palpable. No evidence of flank discomfort. No evidence of inguinal hernia.    Extremities: No clubbing, cyanosis.  Bilateral edema 1+    Normal external female genitalia  Urethral meatus is  normal  Urethra and bladder are nontender to bimanual exam  Well supported anteriorly and posteriorly   Uterus and cervix are surgically absent  No adnexal masses      LABS:    Lab Results   Component Value Date    BUN 12 06/20/2019    CREATININE 0.9 06/20/2019     IMPRESSION:    Encounter Diagnoses   Name Primary?    Incomplete emptying of bladder Yes    Mixed stress and urge urinary incontinence        PLAN:    1. Consent done for autologous fascial sling which will be placed tightly. Will also remove the debris in the bladder.   2.  The catheterized specimen was sent for culture

## 2019-06-18 DIAGNOSIS — Z01.818 PRE-OP TESTING: Primary | ICD-10-CM

## 2019-06-19 ENCOUNTER — TELEPHONE (OUTPATIENT)
Dept: PREADMISSION TESTING | Facility: HOSPITAL | Age: 64
End: 2019-06-19

## 2019-06-20 ENCOUNTER — LAB VISIT (OUTPATIENT)
Dept: LAB | Facility: HOSPITAL | Age: 64
End: 2019-06-20
Attending: ANESTHESIOLOGY
Payer: MEDICARE

## 2019-06-20 ENCOUNTER — EXTERNAL HOME HEALTH (OUTPATIENT)
Dept: HOME HEALTH SERVICES | Facility: HOSPITAL | Age: 64
End: 2019-06-20
Payer: MEDICAID

## 2019-06-20 ENCOUNTER — EXTERNAL HOME HEALTH (OUTPATIENT)
Dept: HOME HEALTH SERVICES | Facility: HOSPITAL | Age: 64
End: 2019-06-20
Payer: MEDICARE

## 2019-06-20 ENCOUNTER — OFFICE VISIT (OUTPATIENT)
Dept: UROLOGY | Facility: CLINIC | Age: 64
End: 2019-06-20
Payer: MEDICARE

## 2019-06-20 VITALS
WEIGHT: 154.31 LBS | BODY MASS INDEX: 26.49 KG/M2 | HEART RATE: 56 BPM | SYSTOLIC BLOOD PRESSURE: 88 MMHG | DIASTOLIC BLOOD PRESSURE: 56 MMHG

## 2019-06-20 DIAGNOSIS — N39.46 MIXED STRESS AND URGE URINARY INCONTINENCE: ICD-10-CM

## 2019-06-20 DIAGNOSIS — Z01.818 PRE-OP TESTING: ICD-10-CM

## 2019-06-20 DIAGNOSIS — R33.9 INCOMPLETE EMPTYING OF BLADDER: Primary | ICD-10-CM

## 2019-06-20 LAB
ANION GAP SERPL CALC-SCNC: 8 MMOL/L (ref 8–16)
BUN SERPL-MCNC: 12 MG/DL (ref 8–23)
CALCIUM SERPL-MCNC: 9.6 MG/DL (ref 8.7–10.5)
CHLORIDE SERPL-SCNC: 99 MMOL/L (ref 95–110)
CO2 SERPL-SCNC: 35 MMOL/L (ref 23–29)
CREAT SERPL-MCNC: 0.9 MG/DL (ref 0.5–1.4)
EST. GFR  (AFRICAN AMERICAN): >60 ML/MIN/1.73 M^2
EST. GFR  (NON AFRICAN AMERICAN): >60 ML/MIN/1.73 M^2
GLUCOSE SERPL-MCNC: 93 MG/DL (ref 70–110)
POTASSIUM SERPL-SCNC: 3.7 MMOL/L (ref 3.5–5.1)
SODIUM SERPL-SCNC: 142 MMOL/L (ref 136–145)

## 2019-06-20 PROCEDURE — 99999 PR PBB SHADOW E&M-EST. PATIENT-LVL IV: CPT | Mod: PBBFAC,HCNC,, | Performed by: UROLOGY

## 2019-06-20 PROCEDURE — 87086 URINE CULTURE/COLONY COUNT: CPT | Mod: HCNC

## 2019-06-20 PROCEDURE — 36415 COLL VENOUS BLD VENIPUNCTURE: CPT | Mod: HCNC

## 2019-06-20 PROCEDURE — 99999 PR PBB SHADOW E&M-EST. PATIENT-LVL IV: ICD-10-PCS | Mod: PBBFAC,HCNC,, | Performed by: UROLOGY

## 2019-06-20 PROCEDURE — 87106 FUNGI IDENTIFICATION YEAST: CPT | Mod: HCNC

## 2019-06-20 PROCEDURE — 87077 CULTURE AEROBIC IDENTIFY: CPT | Mod: HCNC

## 2019-06-20 PROCEDURE — 99213 OFFICE O/P EST LOW 20 MIN: CPT | Mod: HCNC,25,S$GLB, | Performed by: UROLOGY

## 2019-06-20 PROCEDURE — 80048 BASIC METABOLIC PNL TOTAL CA: CPT | Mod: HCNC

## 2019-06-20 PROCEDURE — 87186 SC STD MICRODIL/AGAR DIL: CPT | Mod: HCNC

## 2019-06-20 PROCEDURE — G0179 PR HOME HEALTH MD RECERTIFICATION: ICD-10-PCS | Mod: ,,, | Performed by: UROLOGY

## 2019-06-20 PROCEDURE — 99213 PR OFFICE/OUTPT VISIT, EST, LEVL III, 20-29 MIN: ICD-10-PCS | Mod: HCNC,25,S$GLB, | Performed by: UROLOGY

## 2019-06-20 PROCEDURE — 87088 URINE BACTERIA CULTURE: CPT | Mod: HCNC

## 2019-06-20 PROCEDURE — 51701 PR INSERTION OF NON-INDWELLING BLADDER CATHETERIZATION FOR RESIDUAL UR: ICD-10-PCS | Mod: HCNC,S$GLB,, | Performed by: UROLOGY

## 2019-06-20 PROCEDURE — 3008F BODY MASS INDEX DOCD: CPT | Mod: HCNC,CPTII,S$GLB, | Performed by: UROLOGY

## 2019-06-20 PROCEDURE — 51701 INSERT BLADDER CATHETER: CPT | Mod: HCNC,S$GLB,, | Performed by: UROLOGY

## 2019-06-20 PROCEDURE — G0179 MD RECERTIFICATION HHA PT: HCPCS | Mod: ,,, | Performed by: UROLOGY

## 2019-06-20 PROCEDURE — 3008F PR BODY MASS INDEX (BMI) DOCUMENTED: ICD-10-PCS | Mod: HCNC,CPTII,S$GLB, | Performed by: UROLOGY

## 2019-06-20 NOTE — LETTER
June 20, 2019      Mesfin Hodges II, MD  [address]  [place] [address]           [facility] - Urology 4th Floor  [address]  [facility] [address]  Phone: [phone]          Patient: Tasha Hawley   MR Number: [id number]   YOB: 1955   Date of Visit: 6/20/2019       Dear Dr. Mesfin Hodges II:    Thank you for referring Tasha Hawley to me for evaluation. Attached you will find relevant portions of my assessment and plan of care.    If you have questions, please do not hesitate to call me. I look forward to following Tasha Hawley along with you.    Sincerely,    Shireen Mayo MD    Enclosure  CC:  No Recipients    If you would like to receive this communication electronically, please contact [email or web address] or [phone] to request more information on [employer] Link access.    For providers and/or their staff who would like to refer a patient to [facility], please contact us through our one-stop-shop provider referral line, [facility], at [phone].    If you feel you have received this communication in error or would no longer like to receive these types of communications, please e-mail [email or web address]

## 2019-06-23 LAB
BACTERIA UR CULT: NORMAL
BACTERIA UR CULT: NORMAL

## 2019-06-24 ENCOUNTER — TELEPHONE (OUTPATIENT)
Dept: UROLOGY | Facility: CLINIC | Age: 64
End: 2019-06-24

## 2019-06-24 DIAGNOSIS — N30.90 BLADDER INFECTION: Primary | ICD-10-CM

## 2019-06-24 DIAGNOSIS — B37.49 CANDIDAL UTI (URINARY TRACT INFECTION): ICD-10-CM

## 2019-06-24 RX ORDER — AMPICILLIN 500 MG/1
500 CAPSULE ORAL EVERY 6 HOURS
Qty: 28 CAPSULE | Refills: 0 | Status: SHIPPED | OUTPATIENT
Start: 2019-06-24 | End: 2019-07-11

## 2019-06-24 RX ORDER — FLUCONAZOLE 100 MG/1
100 TABLET ORAL DAILY
Qty: 7 TABLET | Refills: 0 | Status: SHIPPED | OUTPATIENT
Start: 2019-06-24 | End: 2019-07-12 | Stop reason: ALTCHOICE

## 2019-06-25 ENCOUNTER — TELEPHONE (OUTPATIENT)
Dept: UROLOGY | Facility: CLINIC | Age: 64
End: 2019-06-25

## 2019-06-25 NOTE — TELEPHONE ENCOUNTER
Pt had questions about the authorization pending for her surgery on 6/27. I explained to pt we are waiting for her insurance to approve the procedure and we will let her know as soon as we find out. Pt verbalized understanding.

## 2019-06-25 NOTE — TELEPHONE ENCOUNTER
----- Message from Christi Wang LPN sent at 6/25/2019  1:54 PM CDT -----  Contact: pt: [phone]      ----- Message -----  From: Sis Calixto  Sent: 6/25/2019   1:31 PM  To: Maria [facility] Iyer Staff    Needs Advice    Reason for call: pt would like to speak with nurse re surgery         Communication Preference: pt: [phone]

## 2019-06-26 ENCOUNTER — TELEPHONE (OUTPATIENT)
Dept: UROLOGY | Facility: HOSPITAL | Age: 64
End: 2019-06-26

## 2019-06-26 NOTE — TELEPHONE ENCOUNTER
Called the patient because we do not have a pre auth from [facility] for her surgery.  Will post pone her case until we do.    Also, spoke to ALEXY Andrews for Dr. Hillman, pain management regarding his concerns about her back.  She has a spinal fusion from L3 to the sacrum but above it there is scoliosis and he is concerned about spinal instability.  We can be very careful on how she is transferred from rMilton to table, then position her awake, and then induce anesthesia and not move her legs.  Afterward will be very careful about transferring from the table to the rMilton.      The patient and her  expressed understanding.

## 2019-06-28 ENCOUNTER — TELEPHONE (OUTPATIENT)
Dept: UROLOGY | Facility: CLINIC | Age: 64
End: 2019-06-28

## 2019-06-28 DIAGNOSIS — N39.46 MIXED STRESS AND URGE URINARY INCONTINENCE: Primary | ICD-10-CM

## 2019-06-28 DIAGNOSIS — N21.0 BLADDER STONE: ICD-10-CM

## 2019-06-30 PROCEDURE — G0179 PR HOME HEALTH MD RECERTIFICATION: ICD-10-PCS | Mod: ,,, | Performed by: UROLOGY

## 2019-06-30 PROCEDURE — G0179 MD RECERTIFICATION HHA PT: HCPCS | Mod: ,,, | Performed by: UROLOGY

## 2019-07-03 NOTE — PRE ADMISSION SCREENING
Anesthesia Assessment: Preoperative EQUATION    Planned Procedure: Procedure(s) (LRB):  CYSTOSCOPY, WITH SUPRAPUBIC BLADDER SLING CREATION AUTOLOGOUS (N/A)  CYSTOLITHOLAPAXY (N/A)  Requested Anesthesia Type:General  Surgeon: Shireen Mayo MD  Service: Urology  Known or anticipated Date of Surgery:7/16/2019    Surgeon notes: reviewed         Electronic QUestionnaire Assessment completed via nurse interview with patient.        No Aq           Triage considerations:   Pt has a implanted pain pump -- Dilaudid -- followed by Dr. Hillman     Previous anesthesia records:[facility] 5/23/18     Last PCP note: within 1 month , within [facility] Dr. Mesfin Hodges  Subspecialty notes: Cardiology: General, Gastroenterology, Hematology/Oncology, Neurology, Pain Management, Psychiatry, urology     Other important co-morbidities:   GONZALO  Iron deficiency anemia  Chronic pain syndrome  Drug -induced constipation  Dysphagia  Narcotic dependency  Urinary retention  Suprapubic catheter  ? Chronic combined systolic and diastolic congestive failure  Lymphedema of both lower extremities---- states resolved  Scoliosis of lumbar spine  Deaf left ear, 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 (See in Nurse's note)     Optimization:    Medical Opinion Indicated   Plan:      Testing:  Pre-anesthesia  visit                                        Visit focus: none                           Consultation:Patient's PCP for a statement of optimization Pt seen 5/28/19        Navigation: pt surgery re-scheduled due to insurance issues moved from 6/27 to 7/16     Addendum     Assessment:      1. Coronary artery disease involving native coronary artery of native heart without angina pectoris : He is asymptomatic with preserved LVEF.  Continue asa and high intensity statin therapy.   2. Preop cardiovascular exam : She has one clinical risk factor (CAD) according the RCRI placing her at low risk for a perioperative cardiac complication. She should remain on her statin during the perioperative period. ASA can be held one week prior to surgery if needed.    3. Chronic venous insufficiency : Leg elevation and compression stockings recommended.         Plan:      Tasha was seen today for cardiomyopathy, unspecified type.     Diagnoses and all orders for this visit:     Coronary artery disease involving native coronary artery of native heart without angina pectoris     Preop cardiovascular exam     Chronic venous insufficiency           Thank you for allowing me to participate in this patient's care. Please do not hesitate to contact me with any questions or concerns.          Electronically signed by Fay Powers MD at 5/23/2019 10:21 AM         MD Isabel Lundberg II, RN; EDWINA Toure Staff              I know her well.  There's not a lot we can do to optimize her further.  She's got poor exercise tolerance, but no active cardiac issues.  She can proceed without further testing.                   I

## 2019-07-08 ENCOUNTER — TELEPHONE (OUTPATIENT)
Dept: UROLOGY | Facility: CLINIC | Age: 64
End: 2019-07-08

## 2019-07-08 NOTE — TELEPHONE ENCOUNTER
----- Message from Christi Wang LPN sent at 7/3/2019  3:53 PM CDT -----      ----- Message -----  From: Anibal Arizmendi MA  Sent: 7/3/2019   3:14 PM  To: [facility] Staff    Pt scheduled for surgery on 7/16. Please advise if pt needs anything from home health or additional antibiotics.  Thanks

## 2019-07-08 NOTE — TELEPHONE ENCOUNTER
Called [facility]  office at [phone], gave verbal order to , per Dr. Mayo, to have SN go out to pt's home on 7/9/2019 for sptube change and collection of urine specimen from new catheter to send to lab for urine culture with sensitivities.

## 2019-07-09 ENCOUNTER — TELEPHONE (OUTPATIENT)
Dept: UROLOGY | Facility: CLINIC | Age: 64
End: 2019-07-09

## 2019-07-09 NOTE — TELEPHONE ENCOUNTER
HH nurse (Saray) states pt refused to have sptube changed since it was changed last week. She states she did get a urine specimen from the current catheter after cleaning it really well. Wants to know if that is okay to send. Also, noted that pt states she went out of town on July 4th and forgot antibiotics. She resumed taking the medication once she returned home over the weekend.

## 2019-07-09 NOTE — TELEPHONE ENCOUNTER
----- Message from Elida Cummings sent at 7/9/2019  8:55 AM CDT -----  Contact: Saray with [facility]#[phone]  Needs Advice    Reason for call:She states that the pt is refusing to get catheter changed         Communication Preference:call    Additional Information:

## 2019-07-12 DIAGNOSIS — R82.71 BACTERIA IN URINE: Primary | ICD-10-CM

## 2019-07-12 DIAGNOSIS — Z01.818 PRE-OP EXAM: ICD-10-CM

## 2019-07-12 RX ORDER — CIPROFLOXACIN 500 MG/1
500 TABLET ORAL 2 TIMES DAILY
Qty: 14 TABLET | Refills: 0 | Status: SHIPPED | OUTPATIENT
Start: 2019-07-12 | End: 2019-07-19

## 2019-07-12 NOTE — PROGRESS NOTES
Patient of Dr. Mayo;  Scheduled for surgery 07/16/2019  Prelim urine cx showing Serratia marcescens with MDR  Normal Cr; bactrim Allergy    Cipro 500mg sent due to sensitives

## 2019-07-15 ENCOUNTER — TELEPHONE (OUTPATIENT)
Dept: UROLOGY | Facility: CLINIC | Age: 64
End: 2019-07-15

## 2019-07-16 ENCOUNTER — HOSPITAL ENCOUNTER (OUTPATIENT)
Facility: HOSPITAL | Age: 64
Discharge: HOME | End: 2019-07-16
Attending: UROLOGY | Admitting: UROLOGY
Payer: MEDICARE

## 2019-07-16 VITALS
HEART RATE: 49 BPM | DIASTOLIC BLOOD PRESSURE: 50 MMHG | WEIGHT: 160 LBS | SYSTOLIC BLOOD PRESSURE: 92 MMHG | HEIGHT: 64 IN | RESPIRATION RATE: 18 BRPM | OXYGEN SATURATION: 99 % | TEMPERATURE: 98 F | BODY MASS INDEX: 27.31 KG/M2

## 2019-07-16 DIAGNOSIS — T83.010A: ICD-10-CM

## 2019-07-16 PROCEDURE — 25000003 PHARM REV CODE 250: Mod: HCNC | Performed by: ANESTHESIOLOGY

## 2019-07-16 RX ORDER — OXYCODONE AND ACETAMINOPHEN 5; 325 MG/1; MG/1
2 TABLET ORAL ONCE
Status: COMPLETED | OUTPATIENT
Start: 2019-07-16 | End: 2019-07-16

## 2019-07-16 RX ORDER — SODIUM CHLORIDE 9 MG/ML
INJECTION, SOLUTION INTRAVENOUS CONTINUOUS
Status: DISCONTINUED | OUTPATIENT
Start: 2019-07-16 | End: 2019-07-16 | Stop reason: HOSPADM

## 2019-07-16 RX ORDER — LIDOCAINE HYDROCHLORIDE 10 MG/ML
1 INJECTION, SOLUTION EPIDURAL; INFILTRATION; INTRACAUDAL; PERINEURAL ONCE
Status: DISCONTINUED | OUTPATIENT
Start: 2019-07-16 | End: 2019-07-16 | Stop reason: HOSPADM

## 2019-07-16 RX ORDER — FLUCONAZOLE 2 MG/ML
400 INJECTION, SOLUTION INTRAVENOUS
Status: DISCONTINUED | OUTPATIENT
Start: 2019-07-16 | End: 2019-07-16 | Stop reason: HOSPADM

## 2019-07-16 RX ORDER — OXYCODONE AND ACETAMINOPHEN 10; 325 MG/1; MG/1
1 TABLET ORAL ONCE
Status: DISCONTINUED | OUTPATIENT
Start: 2019-07-16 | End: 2019-07-16

## 2019-07-16 RX ORDER — KETOROLAC TROMETHAMINE 30 MG/ML
15 INJECTION, SOLUTION INTRAMUSCULAR; INTRAVENOUS ONCE
Status: DISCONTINUED | OUTPATIENT
Start: 2019-07-16 | End: 2019-07-16 | Stop reason: HOSPADM

## 2019-07-16 RX ORDER — HYDROMORPHONE HYDROCHLORIDE 1 MG/ML
0.5 INJECTION, SOLUTION INTRAMUSCULAR; INTRAVENOUS; SUBCUTANEOUS EVERY 5 MIN PRN
Status: CANCELLED | OUTPATIENT
Start: 2019-07-16

## 2019-07-16 RX ORDER — SODIUM CHLORIDE 0.9 % (FLUSH) 0.9 %
10 SYRINGE (ML) INJECTION
Status: CANCELLED | OUTPATIENT
Start: 2019-07-16

## 2019-07-16 RX ADMIN — OXYCODONE HYDROCHLORIDE AND ACETAMINOPHEN 2 TABLET: 5; 325 TABLET ORAL at 05:07

## 2019-07-16 NOTE — PROGRESS NOTES
"When releasing pre-op orders flag noted "high contraindication"  For Gentamycin secondary to latex allergy.  Urology resident paged  "

## 2019-07-17 NOTE — PROGRESS NOTES
and  at bedside. Procedure cancelled. IV removed and urinary catheter bag emptied. And patient discharged home with family.

## 2019-07-17 NOTE — DISCHARGE SUMMARY
[facility]  Discharge Note  Short Stay    Admit Date: 7/16/2019    Discharge Date and Time: 07/16/2019 7:06 PM      Attending Physician: Shireen Mayo MD     Discharge Provider: Nigel Garces MD    Diagnoses:  Active Hospital Problems    Diagnosis  POA    Suprapubic catheter dysfunction [T83.010A]  Yes      Resolved Hospital Problems   No resolved problems to display.       Discharged Condition: stable    Hospital Course: Patient was admitted for autologous fascial sling but the procedure was canceled due to lack of clearance for positioning specifically noting an unstable spine. The patient was discharged home in good condition on the same day.       Final Diagnoses: Same as principal problem.    Disposition: Home or Self Care    Follow up/Patient Instructions:    Medications:  Reconciled Home Medications:   Current Discharge Medication List      CONTINUE these medications which have NOT CHANGED    Details   atorvastatin (LIPITOR) 80 MG tablet TAKE 1 TABLET BY MOUTH DAILY  Qty: 90 tablet, Refills: 3      butalbital-acetaminophen-caffeine -40 mg (FIORICET, ESGIC) -40 mg per tablet       ciprofloxacin HCl (CIPRO) 500 MG tablet Take 1 tablet (500 mg total) by mouth 2 (two) times daily. for 7 days  Qty: 14 tablet, Refills: 0    Associated Diagnoses: Bacteria in urine; Pre-op exam      FLUoxetine 20 MG capsule Take 3 capsules (60 mg total) by mouth once daily.  Qty: 270 capsule, Refills: 3    Associated Diagnoses: Anxiety      furosemide (LASIX) 40 MG tablet Take 1 tablet (40 mg total) by mouth once daily.  Qty: 90 tablet, Refills: 3    Associated Diagnoses: Hypotension, unspecified hypotension type      hydrocodone-acetaminophen 10-325mg (NORCO)  mg Tab Take 2 tablets by mouth every 4 (four) hours as needed for Pain.       levothyroxine (SYNTHROID) 125 MCG tablet Take 1 tablet (125 mcg total) by mouth before breakfast.  Qty: 90 tablet, Refills: 3    Associated Diagnoses: Primary  hypothyroidism      lidocaine (LIDODERM) 5 % APPLY 1 PATCH DAILY AND WEAR FOR A MAXIMUM OF 12 HOURS  Refills: 5      oxybutynin (DITROPAN XL) 15 MG TR24 Take 1 tablet (15 mg total) by mouth once daily.  Qty: 30 tablet, Refills: 11    Associated Diagnoses: Detrusor instability      pantoprazole (PROTONIX) 40 MG tablet Take 1 tablet (40 mg total) by mouth once daily.  Qty: 90 tablet, Refills: 3    Associated Diagnoses: Esophageal dysphagia      potassium citrate (UROCIT-K) 10 mEq (1,080 mg) TbSR Take 1 tablet (10 mEq total) by mouth 3 (three) times daily with meals.  Qty: 90 tablet, Refills: 11    Associated Diagnoses: Recurrent UTI      QUEtiapine (SEROQUEL) 100 MG Tab Take 1 tablet (100 mg total) by mouth every evening.  Qty: 90 tablet, Refills: 3    Associated Diagnoses: Insomnia, unspecified type      SENNOSIDES (SENNA LAX ORAL) Take 20 mg by mouth every evening.       traZODone (DESYREL) 100 MG tablet Take 3 tablets (300 mg total) by mouth every evening.  Qty: 270 tablet, Refills: 1    Associated Diagnoses: Insomnia, unspecified type      aspirin (ECOTRIN) 81 MG EC tablet Take 1 tablet (81 mg total) by mouth once daily.  Refills: 0      cholecalciferol, vitamin D3, (VITAMIN D3) 5,000 unit Tab Take 5,000 Units by mouth once daily.      clopidogrel (PLAVIX) 75 mg tablet Take 75 mg by mouth once daily.      docusate sodium (COLACE) 100 MG capsule Take 1 capsule (100 mg total) by mouth 2 (two) times daily as needed for Constipation.  Qty: 180 capsule, Refills: 3    Associated Diagnoses: Constipation, unspecified constipation type      promethazine (PHENERGAN) 25 MG tablet Take 25 mg by mouth every 6 (six) hours as needed for Nausea.         STOP taking these medications       lamotrigine (LAMICTAL) 25 MG tablet Comments:   Reason for Stopping:             No discharge procedures on file.     As above

## 2019-07-18 DIAGNOSIS — M53.2X6 SPINAL INSTABILITY OF LUMBAR REGION: Primary | ICD-10-CM

## 2019-07-19 ENCOUNTER — TELEPHONE (OUTPATIENT)
Dept: SPINE | Facility: CLINIC | Age: 64
End: 2019-07-19

## 2019-07-19 DIAGNOSIS — M54.5 LOW BACK PAIN, UNSPECIFIED BACK PAIN LATERALITY, UNSPECIFIED CHRONICITY, WITH SCIATICA PRESENCE UNSPECIFIED: Primary | ICD-10-CM

## 2019-07-21 NOTE — HPI
Patient gave a history of having an unstable spine.  Her pain management specialist was called before surgery.  However, in consultation with Dr. Wolfe, thought it would be prudent to get her cleared (to be assured she can be placed in dorsal lithotomy position, and to make sure she can be transferred to and from the [facility] with routine care.)  This was discussed at length with the patient and her , citing that it would be worse is she were to become paralyzed from the surgery for a quality of life issue.  Will set up with out spine people for clearance.  The patient and her  expressed understanding.

## 2019-07-23 ENCOUNTER — HOSPITAL ENCOUNTER (OUTPATIENT)
Dept: RADIOLOGY | Facility: OTHER | Age: 64
Discharge: HOME | End: 2019-07-23
Attending: PHYSICIAN ASSISTANT
Payer: MEDICARE

## 2019-07-23 ENCOUNTER — OFFICE VISIT (OUTPATIENT)
Dept: SPINE | Facility: CLINIC | Age: 64
End: 2019-07-23
Payer: MEDICARE

## 2019-07-23 VITALS
HEIGHT: 64 IN | WEIGHT: 160 LBS | SYSTOLIC BLOOD PRESSURE: 105 MMHG | BODY MASS INDEX: 27.31 KG/M2 | HEART RATE: 56 BPM | TEMPERATURE: 98 F | DIASTOLIC BLOOD PRESSURE: 53 MMHG

## 2019-07-23 DIAGNOSIS — M47.26 OTHER SPONDYLOSIS WITH RADICULOPATHY, LUMBAR REGION: ICD-10-CM

## 2019-07-23 DIAGNOSIS — M51.36 DDD (DEGENERATIVE DISC DISEASE), LUMBAR: ICD-10-CM

## 2019-07-23 DIAGNOSIS — M54.42 CHRONIC BILATERAL LOW BACK PAIN WITH BILATERAL SCIATICA: Primary | ICD-10-CM

## 2019-07-23 DIAGNOSIS — M54.41 CHRONIC BILATERAL LOW BACK PAIN WITH BILATERAL SCIATICA: Primary | ICD-10-CM

## 2019-07-23 DIAGNOSIS — M41.9 SCOLIOSIS, UNSPECIFIED SCOLIOSIS TYPE, UNSPECIFIED SPINAL REGION: ICD-10-CM

## 2019-07-23 DIAGNOSIS — G89.29 CHRONIC BILATERAL LOW BACK PAIN WITH BILATERAL SCIATICA: Primary | ICD-10-CM

## 2019-07-23 DIAGNOSIS — M54.5 LOW BACK PAIN, UNSPECIFIED BACK PAIN LATERALITY, UNSPECIFIED CHRONICITY, WITH SCIATICA PRESENCE UNSPECIFIED: ICD-10-CM

## 2019-07-23 DIAGNOSIS — M54.16 LUMBAR RADICULOPATHY: ICD-10-CM

## 2019-07-23 PROCEDURE — 72120 X-RAY BEND ONLY L-S SPINE: CPT | Mod: TC,HCNC,FY

## 2019-07-23 PROCEDURE — 99999 PR PBB SHADOW E&M-EST. PATIENT-LVL V: CPT | Mod: PBBFAC,HCNC,, | Performed by: PHYSICIAN ASSISTANT

## 2019-07-23 PROCEDURE — 3008F PR BODY MASS INDEX (BMI) DOCUMENTED: ICD-10-PCS | Mod: HCNC,CPTII,S$GLB, | Performed by: PHYSICIAN ASSISTANT

## 2019-07-23 PROCEDURE — 72100 X-RAY EXAM L-S SPINE 2/3 VWS: CPT | Mod: 26,HCNC,, | Performed by: RADIOLOGY

## 2019-07-23 PROCEDURE — 72100 XR LUMBAR SPINE AP AND LAT WITH FLEX/EXT: ICD-10-PCS | Mod: 26,HCNC,, | Performed by: RADIOLOGY

## 2019-07-23 PROCEDURE — 3008F BODY MASS INDEX DOCD: CPT | Mod: HCNC,CPTII,S$GLB, | Performed by: PHYSICIAN ASSISTANT

## 2019-07-23 PROCEDURE — 99999 PR PBB SHADOW E&M-EST. PATIENT-LVL V: ICD-10-PCS | Mod: PBBFAC,HCNC,, | Performed by: PHYSICIAN ASSISTANT

## 2019-07-23 PROCEDURE — 99213 PR OFFICE/OUTPT VISIT, EST, LEVL III, 20-29 MIN: ICD-10-PCS | Mod: HCNC,S$GLB,, | Performed by: PHYSICIAN ASSISTANT

## 2019-07-23 PROCEDURE — 72120 XR LUMBAR SPINE AP AND LAT WITH FLEX/EXT: ICD-10-PCS | Mod: 26,HCNC,, | Performed by: RADIOLOGY

## 2019-07-23 PROCEDURE — 99213 OFFICE O/P EST LOW 20 MIN: CPT | Mod: HCNC,S$GLB,, | Performed by: PHYSICIAN ASSISTANT

## 2019-07-23 PROCEDURE — 72120 X-RAY BEND ONLY L-S SPINE: CPT | Mod: 26,HCNC,, | Performed by: RADIOLOGY

## 2019-07-23 NOTE — PROGRESS NOTES
"Subjective:     Patient ID:  Tasha Hawley is a 63 y.o. female.    [facility]    Chief Complaint: Low back pain and bilateral leg pain    HPI    Tasha Hawley is a 63 y.o. female who presents with the above CC.    Here for clearance from a spine surgeon per Dr. Mayo's request stating the below:    "Orders placed for spine surgery to determine the degree of her spinal instability, if she can be placed into dorsal lithotomy position.  Need a statement from them if she can have pelvic surgery or if she requires treatment or special precautions."    Patient has had 12 lumbar spine surgeries in the past with Dr. Ellis, Farhad Cam, and Rose.  Has chronic low back pain and bilateral leg pain followed by Dr. Hillman in pain management.  She had a SCS and the battery was taken out but the leads left in.  She currently has a dilaudid pain pump.    Low back pain is constant and bilateral leg pain is constant.  Worse with laying flat and with walking.      Back and leg pain is equal.    Left leg pain is worse than the right leg pain.    Pain rated 10/10.    Patient denies any recent accidents or trauma, no saddle anesthesias, and no bowel or bladder incontinence.      Review of Systems:    Review of Systems   Constitutional: Positive for chills, diaphoresis and malaise/fatigue. Negative for fever and weight loss.   HENT: Positive for congestion, ear pain and hearing loss. Negative for ear discharge, nosebleeds, sinus pain, sore throat and tinnitus.    Eyes: Positive for blurred vision and double vision. Negative for photophobia, pain, discharge and redness.   Respiratory: Negative for cough, hemoptysis, sputum production, shortness of breath, wheezing and stridor.    Cardiovascular: Positive for leg swelling. Negative for chest pain, palpitations, orthopnea and PND.   Gastrointestinal: Positive for constipation and nausea. Negative for abdominal pain, blood in stool, diarrhea, heartburn, melena and " vomiting.   Genitourinary: Negative for dysuria, flank pain, frequency, hematuria and urgency.   Musculoskeletal: Positive for back pain, falls, joint pain and neck pain. Negative for myalgias.   Skin: Negative for itching and rash.   Neurological: Positive for dizziness, speech change, weakness and headaches. Negative for tingling, tremors, sensory change, seizures and loss of consciousness.   Endo/Heme/Allergies: Positive for polydipsia. Negative for environmental allergies. Bruises/bleeds easily.   Psychiatric/Behavioral: Positive for depression. Negative for hallucinations, memory loss and substance abuse. The patient is nervous/anxious. The patient does not have insomnia.          Past Medical History:   Diagnosis Date    Anticoagulant long-term use     Anxiety     Arthritis     Bilateral lower extremity edema     severe chronic    Carotid artery occlusion     Cataract     Coronary artery disease     subtotalled LAD with collateral    Depression     Fever blister     Hypothyroid     Iron deficiency anemia     Lumbar radiculopathy     with chronic pain    Ocular migraine     Sleep apnea     cpap     Past Surgical History:   Procedure Laterality Date    ADENOIDECTOMY      BACK SURGERY      x 12    BIOPSY-BLADDER N/A 3/20/2018    Performed by [facility]en [facility] at [facility] OR 1ST [facility]    BIOPSY-BLADDER N/A 1/26/2017    Performed by [facility] at [facility]    CARDIAC CATHETERIZATION  2016    subtotalled LAD with right to left collaterals    CATARACT EXTRACTION W/  INTRAOCULAR LENS IMPLANT Left     Dr Coleman     CYSTOLITHOLAPAXY N/A 6/27/2019    Performed by [facility] at [facility] OR 2ND [facility]    CYSTOSCOPY N/A 3/20/2018    Performed by [facility] at [facility]    CYSTOSCOPY N/A 1/26/2017    [facility]formed by [facility] at [facility]    CYSTOSCOPY N/A 11/8/2016    Performed by [facility] at [facility]    CYSTOSCOPY, WITH SUPRAPUBIC  BLADDER SLING CREATION AUTOLOGOUS N/A 6/27/2019    Performed by [facility] at [facility] OR 2ND FLR    DISCECTOMY, SPINE, CERVICAL, ANTERIOR APPROACH, WITH FUSION N/A 5/13/2013    Performed by Larry Martinez MD at [facility] OR 2ND FLR    ESOPHAGOGASTRODUODENOSCOPY (EGD) N/A 5/23/2018    Performed by Prince Vance MD at [facility] ENDO (4TH FLR)    ESOPHAGOGASTRODUODENOSCOPY (EGD) N/A 7/21/2017    Performed by Prince Vance MD at [facility] ENDO (4TH FLR)    ESOPHAGOGASTRODUODENOSCOPY (EGD) w/ dilation N/A 8/28/2017    Performed by Prince Vance MD at [facility] ENDO (4TH FLR)    FULGURATION-BLADDER N/A 1/26/2017    Performed by [facility]en [facility] at [facility] OR 1ST [facility]LR    HEART CATH-LEFT Left 1/7/2016    Performed by Alberto Gee MD at [facility] CATH LAB    HYSTERECTOMY  1975    endometriosis    INJECTION-BOTOX N/A 3/20/2018    Performed by [facility] at [facility]    INJECTION-BOTOX N/A 1/26/2017    Performed by [facility] at [facility]    INJECTION-BULKING AGENT URETHRAL  OUTLET N/A 3/20/2018    Performed by [facility] at [facility] OR [facility]ST [facility]LR    INSERTION-INTRAOCULAR LENS (IOL) Left 11/13/2014    Performed by Sanjana Coleman MD at [facility] OR [facility]ST [facility]LR    INSERTION-SUPRAPUBIC TUBE-OPEN N/A 11/8/2016    Performed by [facility] at [facility] OR 2ND [facility]LR    MANOMETRY-ESOPHAGEAL-WITH IMPEDANCE N/A 4/23/2018    Performed by Robert Menendez MD at [facility] ENDO (4TH FLR)    MYELOGRAM N/A 2/19/2013    Performed by [facility] at [facility] OR 2ND FLR    pain pump placement      SQ Dilaudid Pump managed by Dr. Hillman, Pain Management    PHACOEMULSIFICATION-ASPIRATION-CATARACT Left 11/13/2014    Performed by Sanjana Coleman MD at [facility] OR [facility]ST [facility]    OH UPPER GI ENDOSCOPY,DIAGNOSIS [59690 (CPT®)] N/A 7/16/2014    Performed by Prince Vance MD at [facility] ENDO (4TH FLR)    SPINAL CORD STIMULATOR REMOVAL      before Larissa    SPINE SURGERY  5-13-13    CERVICAL FUSION     TONSILLECTOMY       Current Outpatient Medications on File Prior to Visit   Medication Sig Dispense Refill    aspirin (ECOTRIN) 81 MG EC tablet Take 1 tablet (81 mg total) by mouth once daily.  0    atorvastatin (LIPITOR) 80 MG tablet TAKE 1 TABLET BY MOUTH DAILY 90 tablet 3    butalbital-acetaminophen-caffeine -40 mg (FIORICET, ESGIC) -40 mg per tablet       cholecalciferol, vitamin D3, (VITAMIN D3) 5,000 unit Tab Take 5,000 Units by mouth once daily.      docusate sodium (COLACE) 100 MG capsule Take 1 capsule (100 mg total) by mouth 2 (two) times daily as needed for Constipation. 180 capsule 3    FLUoxetine 20 MG capsule Take 3 capsules (60 mg total) by mouth once daily. 270 capsule 3    furosemide (LASIX) 40 MG tablet Take 1 tablet (40 mg total) by mouth once daily. (Patient taking differently: Take 40 mg by mouth 2 (two) times daily. ) 90 tablet 3    hydrocodone-acetaminophen 10-325mg (NORCO)  mg Tab Take 2 tablets by mouth every 4 (four) hours as needed for Pain.       levothyroxine (SYNTHROID) 125 MCG tablet Take 1 tablet (125 mcg total) by mouth before breakfast. 90 tablet 3    lidocaine (LIDODERM) 5 % APPLY 1 PATCH DAILY AND WEAR FOR A MAXIMUM OF 12 HOURS  5    oxybutynin (DITROPAN XL) 15 MG TR24 Take 1 tablet (15 mg total) by mouth once daily. 30 tablet 11    pantoprazole (PROTONIX) 40 MG tablet Take 1 tablet (40 mg total) by mouth once daily. 90 tablet 3    potassium citrate (UROCIT-K) 10 mEq (1,080 mg) TbSR Take 1 tablet (10 mEq total) by mouth 3 (three) times daily with meals. 90 tablet 11    promethazine (PHENERGAN) 25 MG tablet Take 25 mg by mouth every 6 (six) hours as needed for Nausea.      QUEtiapine (SEROQUEL) 100 MG Tab Take 1 tablet (100 mg total) by mouth every evening. 90 tablet 3    SENNOSIDES (SENNA LAX ORAL) Take 20 mg by mouth every evening.       traZODone (DESYREL) 100 MG tablet Take 3 tablets (300 mg total) by mouth every evening. 270 tablet 1     clopidogrel (PLAVIX) 75 mg tablet Take 75 mg by mouth once daily.      [DISCONTINUED] lamotrigine (LAMICTAL) 25 MG tablet Take 1 tablet (25 mg total) by mouth once daily. 30 tablet 6     No current facility-administered medications on file prior to visit.      Review of patient's allergies indicates:   Allergen Reactions    (d)-limonene flavor      Other reaction(s): difficult intubation  Other reaction(s): Difficulty breathing    Bactrim [sulfamethoxazole-trimethoprim] Anaphylaxis    Benadryl [diphenhydramine hcl] Shortness Of Breath    Imitrex [sumatriptan succinate] Shortness Of Breath    Topamax [topiramate] Shortness Of Breath    Vancomycin Shortness Of Breath     Rash    Butorphanol tartrate     Darvocet a500 [propoxyphene n-acetaminophen]      Other reaction(s): Difficulty breathing    Fentanyl      Other reaction(s): Vomiting  Other reaction(s): Nausea  Other reaction(s): Itching  swelling    White petrolatum-zinc     Zinc oxide-white petrolatum      Other reaction(s): Difficulty breathing    Latex, natural rubber Itching and Rash    Phenytoin Rash and Other (See Comments)     Trouble breathing     Social History     Socioeconomic History    Marital status:      Spouse name: Not on file    Number of children: Not on file    Years of education: Not on file    Highest education level: Not on file   Occupational History    Not on file   Social Needs    Financial resource strain: Not on file    Food insecurity:     Worry: Not on file     Inability: Not on file    Transportation needs:     Medical: Not on file     Non-medical: Not on file   Tobacco Use    Smoking status: Never Smoker    Smokeless tobacco: Never Used   Substance and Sexual Activity    Alcohol use: Never     Frequency: Never    Drug use: No    Sexual activity: Never   Lifestyle    Physical activity:     Days per week: Not on file     Minutes per session: Not on file    Stress: Not on file   Relationships     "Social connections:     Talks on phone: Not on file     Gets together: Not on file     Attends Oriental orthodox service: Not on file     Active member of club or organization: Not on file     Attends meetings of clubs or organizations: Not on file     Relationship status: Not on file   Other Topics Concern    Are you pregnant or think you may be? Not Asked    Breast-feeding Not Asked   Social History Narrative    Not on file     Family History   Problem Relation Age of Onset    Cancer Mother 55        breast    Cancer Father         esophagus,had laryngectomy    Esophageal cancer Father     Parkinsonism Maternal Grandmother     Tremor Maternal Grandmother     No Known Problems Brother     No Known Problems Brother     Heart disease Maternal Uncle     No Known Problems Sister     No Known Problems Maternal Aunt     No Known Problems Paternal Aunt     No Known Problems Paternal Uncle     No Known Problems Maternal Grandfather     No Known Problems Paternal Grandmother     No Known Problems Paternal Grandfather     Melanoma Neg Hx     Amblyopia Neg Hx     Blindness Neg Hx     Cataracts Neg Hx     Diabetes Neg Hx     Glaucoma Neg Hx     Hypertension Neg Hx     Macular degeneration Neg Hx     Retinal detachment Neg Hx     Strabismus Neg Hx     Stroke Neg Hx     Thyroid disease Neg Hx     Colon cancer Neg Hx        Objective:      Vitals:    07/23/19 1651   BP: (!) 105/53   Pulse: (!) 56   Temp: 97.9 °F (36.6 °C)   Weight: 72.6 kg (160 lb)   Height: 5' 4" (1.626 m)   PainSc: 10-Worst pain ever   PainLoc: Back         Physical Exam:    General:  Tasha Hawley is well-developed, well-nourished, appears stated age, in no acute distress, alert and oriented to person, place, and time.    Pulmonary/Chest:  Respiratory effort normal  Abdominal: Exhibits no distension  Psychiatric:  Normal mood and affect.  Behavior is normal.  Judgement and thought content normal    Musculoskeletal:  (Exam done in " the chair)    Patient arises from a sitting to standing position with difficulty.  Cannot assess her walking due to her pain level.    Lumbar ROM:   Cannot assess    Lumbar Spine Inspection:  Healed surgical scars.  Hump to the right.    Lumbar Spine Palpation:  Moderate tenderness to low back palpation.    SI Joint Palpation:  Moderate tenderness to SI Joint palpation.    Straight Leg Raise:  Cannot assess    Neurological: Alert and oriented to person, place, and time    Muscle strength against resistance:     Right Left   Hip flexion  5 / 5 5 / 5   Hip extension 5 / 5 5 / 5   Hip abduction 5 / 5 5 / 5   Hip adduction  5 / 5 5 / 5   Knee extension  5 / 5 5 / 5   Knee flexion 5 / 5 5 / 5   Dorsiflexion  5 / 5 5 / 5   EHL  5 / 5 5 / 5   Plantar flexion  5 / 5 5 / 5   Inversion of the feet 5 / 5 5 / 5   Eversion of the feet  5 / 5 5 / 5     Reflexes:     Right Left   Patellar 2+ 2+   Achilles 2+ 2+     Clonus:  Negative bilaterally    On gross examination of the bilateral upper extremities, patient has full painfree ROM with no signs of clubbing, cyanosis, edema, or weakness.     XRAY Results:     Narrative     EXAMINATION:  XR LUMBAR SPINE AP AND LAT WITH FLEX/EXT    CLINICAL HISTORY:  Low back pain    TECHNIQUE:  AP and lateral views as well as lateral flexion and extension images are performed through the lumbar spine.    COMPARISON:  CT 02/24/2019    FINDINGS:  There is severe dextroconvex scoliosis and rotatory scoliosis.  There are postsurgical changes in the lower lumbar spine from prior bilateral pedicle chayo and screw fixation with intervertebral spacing devices.  There is no evidence of periprosthetic lucency or hardware fracture.  Spinal stimulator wires are noted.  Degenerative changes are noted.  There is no gross evidence of acute fracture or subluxation.  Soft tissues are unremarkable.      Impression       Severely limited examination; extensive postsurgical changes with no gross evidence of fracture  "noted.      Electronically signed by: Martir Banda MD  Date: 07/23/2019  Time: 15:56           Assessment:          1. Chronic bilateral low back pain with bilateral sciatica    2. DDD (degenerative disc disease), lumbar    3. Other spondylosis with radiculopathy, lumbar region    4. Lumbar radiculopathy    5. Scoliosis, unspecified scoliosis type, unspecified spinal region            Plan:          Orders Placed This Encounter    CT Lumbar Spine Without Contrast    CT Thoracic Spine Without Contrast    X-Ray Scoliosis Complete     Patient here today from Dr. Mayo for a referral to a spine surgeon to get clearance from a spine stability standpoint for clearance to be in the dorsal lithotomy position for a urologic surgery she needs.  States her pain doctor Dr. Hillman informed Dr. Mayo and the anthesthesiologist she needs clearance.    Will get CT thoracic and lumbar spine and scoliosis xrays and review all of the above with Dr. Angeles    Follow-Up:  Follow up if symptoms worsen or fail to improve. If there are any questions prior to this, the patient was instructed to contact the office.       MICHELLE Tolbert, NATHAN  Neurosurgery  [facility]  [facility]    Addendum:  07/20/19    I reviewed everything with Dr. Angeles.      He said  "The images look fine for surgery -- disclaimer I have not seen patient."     I will relay this information to Dr. Mayo.    MICHELLE Tolbert, NATHAN  Neurosurgery  [facility]  [facility]      "

## 2019-07-23 NOTE — LETTER
July 23, 2019      Shireen Mayo MD  [address]  [facility] [address]           [address]  [facility], [address]  [facility] [address]  Phone: [phone]  Fax: [phone]          Patient: Tasha Hawley   MR Number: [id number]   YOB: 1955   Date of Visit: 7/23/2019       Dear Dr. Shireen Mayo:    Thank you for referring Tasha Hawley to me for evaluation. Attached you will find relevant portions of my assessment and plan of care.    If you have questions, please do not hesitate to call me. I look forward to following Tasha Hawley along with you.    Sincerely,    NATHAN Tolbert  CC:  No Recipients    If you would like to receive this communication electronically, please contact [email or web address] or [phone] to request more information on [employer] access.    For providers and/or their staff who would like to refer a patient to [facility], please contact us through our one-stop-shop provider referral line, [facility], at [phone].    If you feel you have received this communication in error or would no longer like to receive these types of communications, please e-mail [email or web address]

## 2019-07-24 ENCOUNTER — TELEPHONE (OUTPATIENT)
Dept: SPINE | Facility: CLINIC | Age: 64
End: 2019-07-24

## 2019-07-24 NOTE — TELEPHONE ENCOUNTER
----- Message from Ambar Gusman sent at 7/24/2019  2:13 PM CDT -----  Contact: JUDIE YO   Name of Who is Calling: JUDIE YO       What is the request in detail: Patient is requesting a call back. She states that she has question regarding her cat scan that needs to be done. She wants to have the scan done this week.       Can the clinic reply by [facility]: no      What Number to Call Back if not in [facility]: [phone]

## 2019-07-25 ENCOUNTER — EXTERNAL HOME HEALTH (OUTPATIENT)
Dept: HOME HEALTH SERVICES | Facility: HOSPITAL | Age: 64
End: 2019-07-25
Payer: MEDICARE

## 2019-07-25 DIAGNOSIS — K59.00 CONSTIPATION, UNSPECIFIED CONSTIPATION TYPE: ICD-10-CM

## 2019-07-25 RX ORDER — FUROSEMIDE 40 MG/1
40 TABLET ORAL 2 TIMES DAILY
Qty: 60 TABLET | Refills: 11 | Status: CANCELLED | OUTPATIENT
Start: 2019-07-25 | End: 2020-07-24

## 2019-07-26 ENCOUNTER — TELEPHONE (OUTPATIENT)
Dept: SPINE | Facility: CLINIC | Age: 64
End: 2019-07-26

## 2019-07-26 RX ORDER — DOCUSATE SODIUM 100 MG/1
100 CAPSULE, LIQUID FILLED ORAL 3 TIMES DAILY PRN
Qty: 180 CAPSULE | Refills: 3 | Status: SHIPPED | OUTPATIENT
Start: 2019-07-26 | End: 2019-11-07 | Stop reason: SDUPTHER

## 2019-07-26 NOTE — TELEPHONE ENCOUNTER
----- Message from José Antonio Sebastian sent at 7/26/2019  2:14 PM CDT -----  Contact: JUDIE YO [[id number]]  Name of Who is Calling:JUDIE YO [[id number]]      What is the request in detail: Pt requesting to reschedule next available for her CT/Xray. Contact at your earliest convenience.        Can the clinic reply by [facility]: N    [facility]: [phone]

## 2019-07-29 ENCOUNTER — TELEPHONE (OUTPATIENT)
Dept: UROLOGY | Facility: CLINIC | Age: 64
End: 2019-07-29

## 2019-07-29 ENCOUNTER — HOSPITAL ENCOUNTER (OUTPATIENT)
Dept: RADIOLOGY | Facility: HOSPITAL | Age: 64
Discharge: HOME | End: 2019-07-29
Attending: PHYSICIAN ASSISTANT
Payer: MEDICARE

## 2019-07-29 DIAGNOSIS — N30.90 BLADDER INFECTION: Primary | ICD-10-CM

## 2019-07-29 DIAGNOSIS — M41.9 SCOLIOSIS, UNSPECIFIED SCOLIOSIS TYPE, UNSPECIFIED SPINAL REGION: ICD-10-CM

## 2019-07-29 DIAGNOSIS — M54.42 CHRONIC BILATERAL LOW BACK PAIN WITH BILATERAL SCIATICA: ICD-10-CM

## 2019-07-29 DIAGNOSIS — G89.29 CHRONIC BILATERAL LOW BACK PAIN WITH BILATERAL SCIATICA: ICD-10-CM

## 2019-07-29 DIAGNOSIS — M51.36 DDD (DEGENERATIVE DISC DISEASE), LUMBAR: ICD-10-CM

## 2019-07-29 DIAGNOSIS — M47.26 OTHER SPONDYLOSIS WITH RADICULOPATHY, LUMBAR REGION: ICD-10-CM

## 2019-07-29 DIAGNOSIS — M54.41 CHRONIC BILATERAL LOW BACK PAIN WITH BILATERAL SCIATICA: ICD-10-CM

## 2019-07-29 DIAGNOSIS — M54.16 LUMBAR RADICULOPATHY: ICD-10-CM

## 2019-07-29 PROCEDURE — 72082 XR SCOLIOSIS COMPLETE: ICD-10-PCS | Mod: 26,HCNC,, | Performed by: RADIOLOGY

## 2019-07-29 PROCEDURE — 72128 CT THORACIC SPINE WITHOUT CONTRAST: ICD-10-PCS | Mod: 26,HCNC,, | Performed by: RADIOLOGY

## 2019-07-29 PROCEDURE — 72131 CT LUMBAR SPINE WITHOUT CONTRAST: ICD-10-PCS | Mod: 26,HCNC,, | Performed by: RADIOLOGY

## 2019-07-29 PROCEDURE — 72128 CT CHEST SPINE W/O DYE: CPT | Mod: 26,HCNC,, | Performed by: RADIOLOGY

## 2019-07-29 PROCEDURE — 72082 X-RAY EXAM ENTIRE SPI 2/3 VW: CPT | Mod: 26,HCNC,, | Performed by: RADIOLOGY

## 2019-07-29 PROCEDURE — 72082 X-RAY EXAM ENTIRE SPI 2/3 VW: CPT | Mod: TC,HCNC

## 2019-07-29 PROCEDURE — 72128 CT CHEST SPINE W/O DYE: CPT | Mod: TC,HCNC

## 2019-07-29 PROCEDURE — 72131 CT LUMBAR SPINE W/O DYE: CPT | Mod: 26,HCNC,, | Performed by: RADIOLOGY

## 2019-07-29 PROCEDURE — 72131 CT LUMBAR SPINE W/O DYE: CPT | Mod: TC,HCNC

## 2019-07-29 RX ORDER — AMOXICILLIN AND CLAVULANATE POTASSIUM 500; 125 MG/1; MG/1
1 TABLET, FILM COATED ORAL 3 TIMES DAILY
Qty: 21 TABLET | Refills: 0 | Status: SHIPPED | OUTPATIENT
Start: 2019-07-29 | End: 2019-08-08

## 2019-07-29 NOTE — TELEPHONE ENCOUNTER
"States over the last week has had "chills and sweats", low abdomin / pelvic pain. Left message that Dr. Mayo is sending an antibiotic to start. Waiting on sensitivities.     "

## 2019-07-29 NOTE — TELEPHONE ENCOUNTER
Suprapubic tube was changed last week, the patient called complaining of suprapubic tenderness, chills, no fever.      Preliminary of the urine culture gram negative rods.     augmentin sent to [facility] pharmacy [facility]

## 2019-07-29 NOTE — TELEPHONE ENCOUNTER
----- Message from Sis Calixto sent at 7/29/2019  1:14 PM CDT -----  Contact: self [phone]  Patient has a uti,burning,pressure. Been going on for about a week.Please call patient at [phone]    Pharmacy: [facility]

## 2019-07-30 ENCOUNTER — TELEPHONE (OUTPATIENT)
Dept: SPINE | Facility: CLINIC | Age: 64
End: 2019-07-30

## 2019-07-30 ENCOUNTER — PATIENT MESSAGE (OUTPATIENT)
Dept: SPINE | Facility: CLINIC | Age: 64
End: 2019-07-30

## 2019-07-30 NOTE — TELEPHONE ENCOUNTER
Dr. Mayo,    I saw Ms. Hawley in clinic and ordered scoli xrays and CT thoracic and lumbar spine.    Dr. Angeles never saw her in clinic but I reviewed everything with him and the imaging.    He stated that the spine imaging looked fine to have the planned pelvic surgery.    Thanks,  Emilie Rodriguez, [facility], PA-C  Neurosurgery  [facility]  [facility]

## 2019-07-31 ENCOUNTER — CLINICAL SUPPORT (OUTPATIENT)
Dept: AUDIOLOGY | Facility: CLINIC | Age: 64
End: 2019-07-31
Payer: MEDICARE

## 2019-07-31 ENCOUNTER — OFFICE VISIT (OUTPATIENT)
Dept: OTOLARYNGOLOGY | Facility: CLINIC | Age: 64
End: 2019-07-31
Payer: MEDICARE

## 2019-07-31 VITALS — WEIGHT: 160.06 LBS | BODY MASS INDEX: 27.33 KG/M2 | HEIGHT: 64 IN

## 2019-07-31 DIAGNOSIS — H90.3 BILATERAL SENSORINEURAL HEARING LOSS: Primary | ICD-10-CM

## 2019-07-31 DIAGNOSIS — H61.23 BILATERAL IMPACTED CERUMEN: ICD-10-CM

## 2019-07-31 DIAGNOSIS — H90.3 SENSORINEURAL HEARING LOSS (SNHL) OF BOTH EARS: Primary | ICD-10-CM

## 2019-07-31 PROCEDURE — G0268 PR REMOVAL OF IMPACTED WAX MD: ICD-10-PCS | Mod: HCNC,S$GLB,, | Performed by: OTOLARYNGOLOGY

## 2019-07-31 PROCEDURE — 3008F PR BODY MASS INDEX (BMI) DOCUMENTED: ICD-10-PCS | Mod: HCNC,CPTII,S$GLB, | Performed by: OTOLARYNGOLOGY

## 2019-07-31 PROCEDURE — 92567 TYMPANOMETRY: CPT | Mod: HCNC,S$GLB,, | Performed by: AUDIOLOGIST

## 2019-07-31 PROCEDURE — 92557 PR COMPREHENSIVE HEARING TEST: ICD-10-PCS | Mod: HCNC,S$GLB,, | Performed by: AUDIOLOGIST

## 2019-07-31 PROCEDURE — 99204 OFFICE O/P NEW MOD 45 MIN: CPT | Mod: 25,HCNC,S$GLB, | Performed by: OTOLARYNGOLOGY

## 2019-07-31 PROCEDURE — 99999 PR PBB SHADOW E&M-EST. PATIENT-LVL IV: ICD-10-PCS | Mod: PBBFAC,HCNC,, | Performed by: OTOLARYNGOLOGY

## 2019-07-31 PROCEDURE — 99999 PR PBB SHADOW E&M-EST. PATIENT-LVL I: ICD-10-PCS | Mod: PBBFAC,HCNC,,

## 2019-07-31 PROCEDURE — 99204 PR OFFICE/OUTPT VISIT, NEW, LEVL IV, 45-59 MIN: ICD-10-PCS | Mod: 25,HCNC,S$GLB, | Performed by: OTOLARYNGOLOGY

## 2019-07-31 PROCEDURE — 99999 PR PBB SHADOW E&M-EST. PATIENT-LVL I: CPT | Mod: PBBFAC,HCNC,,

## 2019-07-31 PROCEDURE — 92557 COMPREHENSIVE HEARING TEST: CPT | Mod: HCNC,S$GLB,, | Performed by: AUDIOLOGIST

## 2019-07-31 PROCEDURE — 3008F BODY MASS INDEX DOCD: CPT | Mod: HCNC,CPTII,S$GLB, | Performed by: OTOLARYNGOLOGY

## 2019-07-31 PROCEDURE — 92567 PR TYMPA2METRY: ICD-10-PCS | Mod: HCNC,S$GLB,, | Performed by: AUDIOLOGIST

## 2019-07-31 PROCEDURE — 99999 PR PBB SHADOW E&M-EST. PATIENT-LVL IV: CPT | Mod: PBBFAC,HCNC,, | Performed by: OTOLARYNGOLOGY

## 2019-07-31 PROCEDURE — G0268 REMOVAL OF IMPACTED WAX MD: HCPCS | Mod: HCNC,S$GLB,, | Performed by: OTOLARYNGOLOGY

## 2019-07-31 NOTE — PROGRESS NOTES
Mrs. Hawley was seen in the clinic today for a hearing evaluation. She reported that she has no hearing in her right ear and has hearing loss in her left ear.     Audiological testing revealed a profound sensorineural hearing loss in the right ear and a moderately severe to profound sensorineural hearing loss in the left ear. A speech reception threshold was obtained at 60 dBHL in the left ear. Speech discrimination was 52% in the left ear. Speech testing could not be completed in the right ear due to the extent of the patient's hearing loss.     Tympanometry revealed Type A tympanograms, bilaterally.    Recommendations:  1. Otologic evaluation  2. Annual hearing evaluation  3. Noise protection  4. Follow-up with dispensing audiologist for programming adjustments

## 2019-07-31 NOTE — PROGRESS NOTES
Otology/Neurotology Consultation Report       Chief Complaint: worsening hearing loss    History of Present Illness: 63 y.o.  female presents with worsening hearing loss. 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 Has dysarthria, which she also reports is lifelong (chart review mentions h/o CVA which she denies). She has always had difficulty hearing on the left. Wore a hearing aid for some time, however recently feels it is not helping her at all. No history of ear surgery.    Review of Systems   Constitutional: Negative for chills and fever.   HENT: Positive for hearing loss. Negative for ear discharge, ear pain, sore throat and tinnitus.    Eyes: Negative for double vision and photophobia.   Respiratory: Negative for cough, wheezing and stridor.    Cardiovascular: Negative for chest pain and palpitations.   Gastrointestinal: Negative for nausea and vomiting.   Genitourinary: Negative for dysuria and urgency.   Musculoskeletal: Negative for myalgias and neck pain.   Skin: Negative for itching and rash.   Neurological: Negative for dizziness and headaches.          Past Medical History: Patient has a past medical history of Anticoagulant long-term use, Anxiety, Arthritis, Bilateral lower extremity edema, Carotid artery occlusion, Cataract, Coronary artery disease, Depression, Fever blister, Hypothyroid, Iron deficiency anemia, Lumbar radiculopathy, Ocular migraine, and Sleep apnea.    Past Surgical History: Patient has a past surgical history that includes Hysterectomy (1975); Back surgery; pain pump placement; Spine surgery (5-13-13); Cataract extraction w/  intraocular lens implant (Left); Spinal cord stimulator removal; Esophagogastroduodenoscopy (N/A, 5/23/2018); Tonsillectomy; Adenoidectomy; Cardiac catheterization (2016); and Cystoscopic litholapaxy (N/A, 6/27/2019).    Social History: Patient reports that she has never smoked. She has never used smokeless  tobacco. She reports that she does not drink alcohol or use drugs.    Family History: family history includes Cancer in her father; Cancer (age of onset: 55) in her mother; Esophageal cancer in her father; Heart disease in her maternal uncle; No Known Problems in her brother, brother, maternal aunt, maternal grandfather, paternal aunt, paternal grandfather, paternal grandmother, paternal uncle, and sister; Parkinsonism in her maternal grandmother; Tremor in her maternal grandmother.    Medications:   Current Outpatient Medications on File Prior to Visit   Medication Sig Dispense Refill    amoxicillin-clavulanate 500-125mg (AUGMENTIN) 500-125 mg Tab Take 1 tablet (500 mg total) by mouth 3 (three) times daily for 7 days 21 tablet 0    atorvastatin (LIPITOR) 80 MG tablet TAKE 1 TABLET BY MOUTH DAILY 90 tablet 3    butalbital-acetaminophen-caffeine -40 mg (FIORICET, ESGIC) -40 mg per tablet       cholecalciferol, vitamin D3, (VITAMIN D3) 5,000 unit Tab Take 5,000 Units by mouth once daily.      clopidogrel (PLAVIX) 75 mg tablet Take 75 mg by mouth once daily.      docusate sodium (COLACE) 100 MG capsule Take 1 capsule (100 mg total) by mouth 3 (three) times daily as needed for constipation. 180 capsule 3    FLUoxetine 20 MG capsule Take 3 capsules (60 mg total) by mouth once daily. 270 capsule 3    furosemide (LASIX) 40 MG tablet Take 1 tablet (40 mg total) by mouth once daily. (Patient taking differently: Take 40 mg by mouth 2 (two) times daily. ) 90 tablet 3    hydrocodone-acetaminophen 10-325mg (NORCO)  mg Tab Take 2 tablets by mouth every 4 (four) hours as needed for Pain.       levothyroxine (SYNTHROID) 125 MCG tablet Take 1 tablet (125 mcg total) by mouth before breakfast. 90 tablet 3    lidocaine (LIDODERM) 5 % APPLY 1 PATCH DAILY AND WEAR FOR A MAXIMUM OF 12 HOURS  5    oxybutynin (DITROPAN XL) 15 MG TR24 Take 1 tablet (15 mg total) by mouth once daily. 30 tablet 11    pantoprazole  (PROTONIX) 40 MG tablet Take 1 tablet (40 mg total) by mouth once daily. 90 tablet 3    potassium citrate (UROCIT-K) 10 mEq (1,080 mg) TbSR Take 1 tablet (10 mEq total) by mouth 3 (three) times daily with meals. 90 tablet 11    promethazine (PHENERGAN) 25 MG tablet Take 25 mg by mouth every 6 (six) hours as needed for Nausea.      QUEtiapine (SEROQUEL) 100 MG Tab Take 1 tablet (100 mg total) by mouth every evening. 90 tablet 3    SENNOSIDES (SENNA LAX ORAL) Take 20 mg by mouth every evening.       traZODone (DESYREL) 100 MG tablet Take 3 tablets (300 mg total) by mouth every evening. 270 tablet 1    aspirin (ECOTRIN) 81 MG EC tablet Take 1 tablet (81 mg total) by mouth once daily.  0    [DISCONTINUED] lamotrigine (LAMICTAL) 25 MG tablet Take 1 tablet (25 mg total) by mouth once daily. 30 tablet 6     No current facility-administered medications on file prior to visit.          Allergies: Patient is allergic to (d)-limonene flavor; bactrim [sulfamethoxazole-trimethoprim]; benadryl [diphenhydramine hcl]; imitrex [sumatriptan succinate]; topamax [topiramate]; vancomycin; butorphanol tartrate; darvocet a500 [propoxyphene n-acetaminophen]; fentanyl; white petrolatum-zinc; zinc oxide-white petrolatum; latex, natural rubber; and phenytoin.    Physical Exam   Constitutional: She is oriented to person, place, and time and well-developed, well-nourished, and in no distress.   HENT:   Head: Normocephalic and atraumatic.   Right Ear: Tympanic membrane, external ear and ear canal normal.   Left Ear: Tympanic membrane, external ear and ear canal normal.   Nose: Nose normal. No mucosal edema or rhinorrhea.   Mouth/Throat: Uvula is midline, oropharynx is clear and moist and mucous membranes are normal.   HB II/VI on right side    Procedure Note:    The patient was brought to the minor procedure room and placed under the operating microscope. Using a combination of suction, curettes and cup forceps the patient's cerumen  impaction was removed. The tympanic membrane was evaluated and was unremarkable. The patient tolerated the procedure well. There were no complications.     Eyes: Pupils are equal, round, and reactive to light. EOM are normal.   Neck: Normal range of motion. Neck supple.   Pulmonary/Chest: Effort normal. No respiratory distress.   Neurological: She is oriented to person, place, and time.   Skin: Skin is warm and dry.     CT of Head WNL for CPA lesions.                             Tasha was seen today for hearing loss.    Diagnoses and all orders for this visit:    Bilateral sensorineural hearing loss    Bilateral impacted cerumen    Other orders  -     Ambulatory referral to Audiology      Poor CI cand due to hearing, MMP.      P: Patient to see Audiology for hearing aid evaluation.    F/U 2 yrs.

## 2019-08-01 ENCOUNTER — TELEPHONE (OUTPATIENT)
Dept: SPINE | Facility: CLINIC | Age: 64
End: 2019-08-01

## 2019-08-01 NOTE — TELEPHONE ENCOUNTER
Dr. Mayo,    Please review the below response from Dr. Angeles.    From his standpoint she can have the surgery in the lithotomy position and transfer from gurney to table and back with minimal risk from a spine standpoint.    Thanks,  Emilie Rodriguez, [facility], PA-C  Neurosurgery  Back and Spine Center  [facility]

## 2019-08-01 NOTE — TELEPHONE ENCOUNTER
"----- Message from Robert Angeles MD sent at 8/1/2019  8:50 AM CDT -----  You can but put in disclaimers. I wouldn't say clear. I'd say minimal risk etc  ----- Message -----  From: Emilie Rodriguez PA-C  Sent: 8/1/2019   8:24 AM  To: MD Dr. Karthik Lucas,    This patient that needed clearance for pelvic surgery that I asked you about....    She's the patient who has had 12 spine surgeries and has a dilaudid pain pump.    Dr. Mayo wrote back and said  "Thank you for the evaluation.  I need a statement in the chart attesting that her spine is stable for placement in lithotomy and transfer from [facility] to table and back.  Thanks."    Is it ok for me to write that in the chart?    Thanks,  Emilie Rodriguez [facility], NATHAN  Neurosurgery  Back and Spine Center  [facility]       "

## 2019-08-02 ENCOUNTER — TELEPHONE (OUTPATIENT)
Dept: UROLOGY | Facility: CLINIC | Age: 64
End: 2019-08-02

## 2019-08-02 ENCOUNTER — PATIENT MESSAGE (OUTPATIENT)
Dept: UROLOGY | Facility: CLINIC | Age: 64
End: 2019-08-02

## 2019-08-02 NOTE — TELEPHONE ENCOUNTER
Patient had a culture with proteus and staph aureus.  The staph is likely a contaminant so will treat the proteus.  Sensitive to augmentin. Which was ordered on 7/29/2019

## 2019-08-05 DIAGNOSIS — I95.9 HYPOTENSION, UNSPECIFIED HYPOTENSION TYPE: ICD-10-CM

## 2019-08-05 RX ORDER — FUROSEMIDE 40 MG/1
40 TABLET ORAL DAILY
Qty: 90 TABLET | Refills: 3 | Status: SHIPPED | OUTPATIENT
Start: 2019-08-05 | End: 2019-11-07 | Stop reason: SDUPTHER

## 2019-08-05 RX ORDER — FUROSEMIDE 40 MG/1
40 TABLET ORAL DAILY
Qty: 90 TABLET | Refills: 3 | OUTPATIENT
Start: 2019-08-05 | End: 2020-08-04

## 2019-08-06 ENCOUNTER — TELEPHONE (OUTPATIENT)
Dept: UROLOGY | Facility: CLINIC | Age: 64
End: 2019-08-06

## 2019-08-06 DIAGNOSIS — N39.46 MIXED STRESS AND URGE URINARY INCONTINENCE: Primary | ICD-10-CM

## 2019-08-06 DIAGNOSIS — Z93.59 SUPRAPUBIC CATHETER: ICD-10-CM

## 2019-08-06 DIAGNOSIS — N31.9 NEUROGENIC BLADDER: ICD-10-CM

## 2019-08-06 NOTE — TELEPHONE ENCOUNTER
Spoke with pt. Scheduled surgery for 8/22. Pt states she will make arrangements with her home health to have cath change and urine culture done. Instructions have been mailed to pt.

## 2019-08-06 NOTE — TELEPHONE ENCOUNTER
----- Message from Shireen Mayo MD sent at 8/5/2019  6:27 PM CDT -----  Contact: pt   I need her to have her catheter changed. Home health can do it.  No other pre op at this point.  Thanks.  ----- Message -----  From: Anibal Arizmendi MA  Sent: 8/5/2019   2:50 PM  To: Shireen Mayo MD    Does pt need another preop. She has signed consent already. Please let me know if I can just add pt back to surgery schedule  ----- Message -----  From: Christi Wang LPN  Sent: 8/5/2019   2:40 PM  To: Anibal Arizmendi MA        ----- Message -----  From: Rita Persaud  Sent: 8/5/2019  12:15 PM  To: Maria Esther Iyer Staff    Needs Advice    Reason for call: pt called to know when you can reschedule her procedure        Communication Preference: [phone]    Additional Information:

## 2019-08-12 ENCOUNTER — ANESTHESIA EVENT (OUTPATIENT)
Dept: SURGERY | Facility: HOSPITAL | Age: 64
End: 2019-08-12
Payer: MEDICARE

## 2019-08-12 DIAGNOSIS — Z01.818 PREOPERATIVE TESTING: Primary | ICD-10-CM

## 2019-08-12 DIAGNOSIS — E03.9 PRIMARY HYPOTHYROIDISM: Chronic | ICD-10-CM

## 2019-08-12 NOTE — PRE ADMISSION SCREENING
Anesthesia Assessment: Preoperative EQUATION    Planned Procedure: Procedure(s) (LRB):  CYSTOSCOPY, WITH SUPRAPUBIC BLADDER SLING CREATION AUTOLOGOUS (N/A)  CYSTOLITHOLAPAXY (N/A)  Requested Anesthesia Type:General  Surgeon: Shireen Mayo MD  Service: Urology  Known or anticipated Date of Surgery:8/22/2019    Surgeon notes: reviewed    Electronic QUestionnaire Assessment completed via nurse interview with patient.        NO AQ        Triage considerations: Pt. Has A Implanted pain pump-- Dilaudid    The patient has no apparent active cardiac condition (No unstable coronary Syndrome such as severe unstable angina or recent [<1 month] myocardial infarction, decompensated CHF, severe valvular   disease or significant arrhythmia)    Previous anesthesia records:GETA, MAC and No problems   Airway/Jaw/Neck:  Airway Findings: Mouth Opening: Normal Tongue: Normal  General Airway Assessment: Adult  Mallampati: II  TM Distance: Normal, at least 6 cm  Jaw/Neck Findings:  Micrognathia: Negative Neck ROM: Extension Decreased, Mild, Decreased flexion, Decreased Lateral Motion, Extension Painful  Neck Findings:      Dental:  Dental Findings: Upper partial dentures, Lower Dentures     Last PCP note: 3-6 months ago , within [facility] 5/28/19  Subspecialty notes: ENT, URLOGY, SPINE SERVICES    Other important co-morbidities: GONZALO  Iron deficiency anemia,Chronic pain syndrome, Chronic combined systolic and diastolic congestive failure,Lymphedema of both lower extremities,Scoliosis of lumbar spine  Deaf left ear, [place] rt ear,Has implanted pain pump,difficulty swallowing,Walker/ wheelchair bound     Tests already available:  Available tests,  within 3 months , within [facility] .6/20/19-BMP            Instructions given. (See in Nurse's note)    Optimization:  Anesthesia Preop Clinic Assessment  Indicated-NOT INDICATED    Medical Opinion Indicated       Sub-specialist consult indicated: MEDICATION INSTRUCTIONS CARDIOLOGY          Plan:    Testing:  Hematology Profile and TSH     Consultation:MEDICATION INSTRUCTIONS CARDIOLOGY       Patient  has previously scheduled Medical Appointment:NOT AT THIS TIME    Navigation: Tests Scheduled.              Consults scheduled.             Results will be tracked by Preop Clinic.

## 2019-08-12 NOTE — ANESTHESIA PREPROCEDURE EVALUATION
Fernanda Yuen, RN   Registered Nurse      Pre Admission Screening   Signed                       []Hide copied text    [][place] for details  Anesthesia Assessment: Preoperative EQUATION     Planned Procedure: Procedure(s) (LRB):  CYSTOSCOPY, WITH SUPRAPUBIC BLADDER SLING CREATION AUTOLOGOUS (N/A)  CYSTOLITHOLAPAXY (N/A)  Requested Anesthesia Type:General  Surgeon: Shireen Mayo MD  Service: Urology  Known or anticipated Date of Surgery:8/22/2019     Surgeon notes: reviewed     Electronic QUestionnaire Assessment completed via nurse interview with patient.         NO AQ           Triage considerations: Pt. Has A Implanted pain pump-- Dilaudid     The patient has no apparent active cardiac condition (No unstable coronary Syndrome such as severe unstable angina or recent [<1 month] myocardial infarction, decompensated CHF, severe valvular   disease or significant arrhythmia)     Previous anesthesia records:GETA, MAC and No problems   Airway/Jaw/Neck:  Airway Findings: Mouth Opening: Normal Tongue: Normal  General Airway Assessment: Adult  Mallampati: II  TM Distance: Normal, at least 6 cm  Jaw/Neck Findings:  Micrognathia: Negative Neck ROM: Extension Decreased, Mild, Decreased flexion, Decreased Lateral Motion, Extension Painful  Neck Findings:      Dental:  Dental Findings: Upper partial dentures, Lower Dentures      Last PCP note: 3-6 months ago , within [facility] 5/28/19  Subspecialty notes: ENT, URLOGY, SPINE SERVICES     Other important co-morbidities: GONZALO  Iron deficiency anemia,Chronic pain syndrome, Chronic combined systolic and diastolic congestive failure,Lymphedema of both lower extremities,Scoliosis of lumbar spine  Deaf left ear, [place] rt ear,Has implanted pain pump,difficulty swallowing,Walker/ wheelchair bound     Tests already available:  Available tests,  within 3 months , within [facility] .6/20/19-BMP                            Instructions given. (See in Nurse's note)     Optimization:   Anesthesia Preop Clinic Assessment  Indicated-NOT INDICATED    Medical Opinion Indicated                            Sub-specialist consult indicated: MEDICATION INSTRUCTIONS CARDIOLOGY                   Plan:    Testing:  Hematology Profile and TSH                           Consultation:MEDICATION INSTRUCTIONS CARDIOLOGY                             Patient  has previously scheduled Medical Appointment:NOT AT THIS TIME     Navigation: Tests Scheduled.                         Consults scheduled.                        Results will be tracked by Preop Clinic.       -Preop cardiovascular exam : She has one clinical risk factor (CAD) according the RCRI placing her at low risk for a perioperative cardiac complication. She should remain on her statin during the perioperative period. ASA can be held one week prior to surgery if needed.                        8/16/19-LABS NOTED.                                                                                            08/12/2019  Tasha Hawley is a 63 y.o., female.    Anesthesia Evaluation         Review of Systems  Anesthesia Hx:  Dr. Mayo,     Please review the below response from Dr. Angeles.     From his standpoint she can have the surgery in the lithotomy position and transfer from [facility] to table and back with minimal risk from a spine standpoint.     Thanks,  Emilie Rodriguez, [facility], PA-C  Neurosurgery  Back and Spine Center  [facility]    History of prior surgery of interest to airway management or planning: Previous anesthesia: General EGD 5/23/19 with general anesthesia. Denies Family Hx of Anesthesia complications.   Denies Personal Hx of Anesthesia complications.   Social:  Non-Smoker, No Alcohol Use USES WALKER. DYSARTHRIA.   Hematology/Oncology:     Oncology Normal    -- Anemia: Iron Deficiency Anemia   EENT/Dental:   CONGENITAL RIGHT SIDED FACIAL PALSY. DENTURES. Eyes: CATARACT Ears General/Symptom(s) Hearing Impairment:  deaf- right LEFT EAR HEARING LOSS   Cardiovascular:    Denies Angina. ANTI-COAG USE Functional Capacity 2 METS  Coronary Artery Disease:    Pulmonary:   Denies Shortness of breath.  Denies Recent URI. Sleep Apnea    Renal/:   MIXED STRESS AND URGE URINARY INCONTINENCE. NEUROGENIC BLADDER. SUPRAPUBIC CATHETER. Renal Symptoms/Infections/Stones: incontinence.    Hepatic/GI:   GERD    Musculoskeletal:  Joint Disease:  Arthritis  Lumbar Spine Disorders LUMBAR RADICULOPATHY  Neurological:  Dx of Headaches, Migraine Headache   Endocrine:  Thyroid Disease Hypothyroidism    Psych:  Anxiety Disorder.  Depression and Major Depression, Treated.          Physical Exam  General:  Well nourished    Airway/Jaw/Neck:  Airway Findings: Mouth Opening: Normal Tongue: Normal  General Airway Assessment: Adult  Mallampati: II  TM Distance: Normal, at least 6 cm  Jaw/Neck Findings:  Micrognathia: Negative Neck ROM: Extension Decreased, Mild, Decreased flexion, Decreased Lateral Motion, Extension Painful  Neck Findings:      Dental:  Dental Findings: Upper partial dentures, Lower Dentures   Chest/Lungs:  Chest/Lungs Findings: Clear to auscultation, Normal Respiratory Rate     Heart/Vascular:  Heart Findings: Rate: Normal  Rhythm: Regular Rhythm  Sounds: Normal        Mental Status:  Mental Status Findings:  Alert and Oriented, Cooperative          Anesthesia Plan  Type of Anesthesia, risks & benefits discussed:  Anesthesia Type:  general  Patient's Preference:   Intra-op Monitoring Plan: standard ASA monitors  Intra-op Monitoring Plan Comments:   Post Op Pain Control Plan: multimodal analgesia  Post Op Pain Control Plan Comments:   Induction:   IV  Beta Blocker:  Patient is not currently on a Beta-Blocker (No further documentation required).       Informed Consent: Patient understands risks and agrees with Anesthesia plan.  Questions answered. Anesthesia consent signed with patient.  ASA Score: 3     Day of Surgery Review of History &  Physical:    H&P update referred to the surgeon.         Ready For Surgery From Anesthesia Perspective.

## 2019-08-13 ENCOUNTER — TELEPHONE (OUTPATIENT)
Dept: PREADMISSION TESTING | Facility: HOSPITAL | Age: 64
End: 2019-08-13

## 2019-08-13 NOTE — TELEPHONE ENCOUNTER
----- Message from Fernanda Yuen RN sent at 8/12/2019 12:52 PM CDT -----  8/22/19 PLEASE CALL PATIENT'S  AND MAKE APPT FOR LABS                                         THANKS SO MUCH

## 2019-08-15 ENCOUNTER — LAB VISIT (OUTPATIENT)
Dept: LAB | Facility: HOSPITAL | Age: 64
End: 2019-08-15
Attending: ANESTHESIOLOGY
Payer: MEDICARE

## 2019-08-15 DIAGNOSIS — Z01.818 PREOPERATIVE TESTING: ICD-10-CM

## 2019-08-15 DIAGNOSIS — E03.9 PRIMARY HYPOTHYROIDISM: Chronic | ICD-10-CM

## 2019-08-15 LAB
ERYTHROCYTE [DISTWIDTH] IN BLOOD BY AUTOMATED COUNT: 14.4 % (ref 11.5–14.5)
HCT VFR BLD AUTO: 38.3 % (ref 37–48.5)
HGB BLD-MCNC: 12.1 G/DL (ref 12–16)
MCH RBC QN AUTO: 27.6 PG (ref 27–31)
MCHC RBC AUTO-ENTMCNC: 31.6 G/DL (ref 32–36)
MCV RBC AUTO: 87 FL (ref 82–98)
PLATELET # BLD AUTO: 212 K/UL (ref 150–350)
PMV BLD AUTO: 10.6 FL (ref 9.2–12.9)
RBC # BLD AUTO: 4.39 M/UL (ref 4–5.4)
TSH SERPL DL<=0.005 MIU/L-ACNC: 0.91 UIU/ML (ref 0.4–4)
WBC # BLD AUTO: 4.6 K/UL (ref 3.9–12.7)

## 2019-08-15 PROCEDURE — 84443 ASSAY THYROID STIM HORMONE: CPT | Mod: HCNC

## 2019-08-15 PROCEDURE — 36415 COLL VENOUS BLD VENIPUNCTURE: CPT | Mod: HCNC

## 2019-08-15 PROCEDURE — 85027 COMPLETE CBC AUTOMATED: CPT | Mod: HCNC

## 2019-08-19 ENCOUNTER — TELEPHONE (OUTPATIENT)
Dept: UROLOGY | Facility: CLINIC | Age: 64
End: 2019-08-19

## 2019-08-19 DIAGNOSIS — N30.90 BLADDER INFECTION: Primary | ICD-10-CM

## 2019-08-19 RX ORDER — AMPICILLIN 500 MG/1
500 CAPSULE ORAL EVERY 6 HOURS
Qty: 28 CAPSULE | Refills: 0 | Status: SHIPPED | OUTPATIENT
Start: 2019-08-19 | End: 2019-09-02

## 2019-08-19 NOTE — TELEPHONE ENCOUNTER
----- Message from Kristen Bray LPN sent at 8/16/2019  5:01 PM CDT -----  Contact: pt       ----- Message -----  From: Rita Persaud  Sent: 8/16/2019   4:35 PM  To: Maria Esther Iyer Staff    Needs Advice    Reason for call: pt called stated she have a UTI. Please call pt. She very upset        Communication Preference: [phone]    Additional Information:

## 2019-08-20 NOTE — TELEPHONE ENCOUNTER
Received a fax that the patient had 2 cultures, 100 K proteus mirabilis and  K enterococcus faecalis.  Only med they are both sensitive to is ampicillin.  This was sent to her preferred pharmacy ([facility])

## 2019-08-21 ENCOUNTER — TELEPHONE (OUTPATIENT)
Dept: UROLOGY | Facility: CLINIC | Age: 64
End: 2019-08-21

## 2019-08-21 ENCOUNTER — TELEPHONE (OUTPATIENT)
Dept: CARDIOLOGY | Facility: CLINIC | Age: 64
End: 2019-08-21

## 2019-08-21 NOTE — TELEPHONE ENCOUNTER
----- Message from Roslyn Katie sent at 8/21/2019  2:08 PM CDT -----  [facility]  .Ne[facility] Adv[facility]    Reason for call: Severe swelling generalized, mostly in her legs. In 3 wks Pt gained 17lbs, no other symptoms. Asking if can increase Lasix, Pt is taking 40mg- Lasix twice day. Pt is schedule urologist-bladder surgery tomorrow.        Communication Preference: [phone] Saray    Additional Information: LEONA- 5/23/19, Priya Aponte

## 2019-08-21 NOTE — TELEPHONE ENCOUNTER
Home health nurse calling to report 17lb weight gain in one week. Weight 160lb on 8/1, last week 161lb, today 177.2lb with marked swelling to LE and some edema in hands. Reports some Increased JONES. Denies labored breathing, auscultated lungs clear. /68. Taking Lasix 40mg BID. Surgery scheduled with Dr. Mayo tomorrow. States has had increase of salt in diet eating salted sunflower seeds. Was staying with niece for the last few days, Denies missed medications. Message routed to Dr. Powers for review and Dr. Mayo's office as FYANH

## 2019-08-21 NOTE — TELEPHONE ENCOUNTER
Home health nurse advised as per Dr Powers to increase Lasix to 80mg BID until back at baseline weight with BMP in one week. Denied further questions or concerns.

## 2019-08-21 NOTE — TELEPHONE ENCOUNTER
Yes, she can double her lasix to 80 mg bid until her weight comes back to baseline. She will need a BMP in one week.

## 2019-08-22 ENCOUNTER — ANESTHESIA (OUTPATIENT)
Dept: SURGERY | Facility: HOSPITAL | Age: 64
End: 2019-08-22
Payer: MEDICARE

## 2019-08-22 ENCOUNTER — HOSPITAL ENCOUNTER (EMERGENCY)
Facility: HOSPITAL | Age: 64
Discharge: HOME | End: 2019-08-22
Attending: EMERGENCY MEDICINE
Payer: MEDICARE

## 2019-08-22 VITALS
OXYGEN SATURATION: 99 % | HEIGHT: 64 IN | DIASTOLIC BLOOD PRESSURE: 63 MMHG | BODY MASS INDEX: 30.9 KG/M2 | RESPIRATION RATE: 16 BRPM | TEMPERATURE: 98 F | SYSTOLIC BLOOD PRESSURE: 130 MMHG | WEIGHT: 181 LBS | HEART RATE: 42 BPM

## 2019-08-22 DIAGNOSIS — R41.82 ALTERED MENTAL STATUS, UNSPECIFIED ALTERED MENTAL STATUS TYPE: Primary | ICD-10-CM

## 2019-08-22 DIAGNOSIS — R53.83 FATIGUE: ICD-10-CM

## 2019-08-22 LAB
ALBUMIN SERPL BCP-MCNC: 3.9 G/DL (ref 3.5–5.2)
ALP SERPL-CCNC: 111 U/L (ref 55–135)
ALT SERPL W/O P-5'-P-CCNC: 17 U/L (ref 10–44)
ANION GAP SERPL CALC-SCNC: 13 MMOL/L (ref 8–16)
AST SERPL-CCNC: 20 U/L (ref 10–40)
BASOPHILS # BLD AUTO: 0.01 K/UL (ref 0–0.2)
BASOPHILS NFR BLD: 0.2 % (ref 0–1.9)
BILIRUB SERPL-MCNC: 0.5 MG/DL (ref 0.1–1)
BILIRUB UR QL STRIP: NEGATIVE
BUN SERPL-MCNC: 11 MG/DL (ref 8–23)
CALCIUM SERPL-MCNC: 9.7 MG/DL (ref 8.7–10.5)
CHLORIDE SERPL-SCNC: 97 MMOL/L (ref 95–110)
CK SERPL-CCNC: 49 U/L (ref 20–180)
CLARITY UR: CLEAR
CO2 SERPL-SCNC: 31 MMOL/L (ref 23–29)
COLOR UR: YELLOW
CREAT SERPL-MCNC: 0.8 MG/DL (ref 0.5–1.4)
DIFFERENTIAL METHOD: ABNORMAL
EOSINOPHIL # BLD AUTO: 0 K/UL (ref 0–0.5)
EOSINOPHIL NFR BLD: 0.8 % (ref 0–8)
ERYTHROCYTE [DISTWIDTH] IN BLOOD BY AUTOMATED COUNT: 14.5 % (ref 11.5–14.5)
EST. GFR  (AFRICAN AMERICAN): >60 ML/MIN/1.73 M^2
EST. GFR  (NON AFRICAN AMERICAN): >60 ML/MIN/1.73 M^2
GLUCOSE SERPL-MCNC: 90 MG/DL (ref 70–110)
GLUCOSE UR QL STRIP: NEGATIVE
HCT VFR BLD AUTO: 37.6 % (ref 37–48.5)
HGB BLD-MCNC: 12 G/DL (ref 12–16)
HGB UR QL STRIP: ABNORMAL
KETONES UR QL STRIP: NEGATIVE
LEUKOCYTE ESTERASE UR QL STRIP: NEGATIVE
LYMPHOCYTES # BLD AUTO: 0.9 K/UL (ref 1–4.8)
LYMPHOCYTES NFR BLD: 18.1 % (ref 18–48)
MAGNESIUM SERPL-MCNC: 1.9 MG/DL (ref 1.6–2.6)
MCH RBC QN AUTO: 27.5 PG (ref 27–31)
MCHC RBC AUTO-ENTMCNC: 31.9 G/DL (ref 32–36)
MCV RBC AUTO: 86 FL (ref 82–98)
MONOCYTES # BLD AUTO: 0.5 K/UL (ref 0.3–1)
MONOCYTES NFR BLD: 9.9 % (ref 4–15)
NEUTROPHILS # BLD AUTO: 3.5 K/UL (ref 1.8–7.7)
NEUTROPHILS NFR BLD: 71 % (ref 38–73)
NITRITE UR QL STRIP: NEGATIVE
PH UR STRIP: 8 [PH] (ref 5–8)
PLATELET # BLD AUTO: 249 K/UL (ref 150–350)
PMV BLD AUTO: 9.9 FL (ref 9.2–12.9)
POTASSIUM SERPL-SCNC: 3.3 MMOL/L (ref 3.5–5.1)
PROT SERPL-MCNC: 7.4 G/DL (ref 6–8.4)
PROT UR QL STRIP: NEGATIVE
RBC # BLD AUTO: 4.37 M/UL (ref 4–5.4)
SODIUM SERPL-SCNC: 141 MMOL/L (ref 136–145)
SP GR UR STRIP: 1.01 (ref 1–1.03)
TROPONIN I SERPL DL<=0.01 NG/ML-MCNC: 0.01 NG/ML (ref 0–0.03)
TSH SERPL DL<=0.005 MIU/L-ACNC: 0.82 UIU/ML (ref 0.4–4)
URN SPEC COLLECT METH UR: ABNORMAL
UROBILINOGEN UR STRIP-ACNC: NEGATIVE EU/DL
WBC # BLD AUTO: 4.97 K/UL (ref 3.9–12.7)

## 2019-08-22 PROCEDURE — 85025 COMPLETE CBC W/AUTO DIFF WBC: CPT | Mod: HCNC

## 2019-08-22 PROCEDURE — 82550 ASSAY OF CK (CPK): CPT | Mod: HCNC

## 2019-08-22 PROCEDURE — 83735 ASSAY OF MAGNESIUM: CPT | Mod: HCNC

## 2019-08-22 PROCEDURE — 84443 ASSAY THYROID STIM HORMONE: CPT | Mod: HCNC

## 2019-08-22 PROCEDURE — 96360 HYDRATION IV INFUSION INIT: CPT | Mod: HCNC

## 2019-08-22 PROCEDURE — 80053 COMPREHEN METABOLIC PANEL: CPT | Mod: HCNC

## 2019-08-22 PROCEDURE — 63600175 PHARM REV CODE 636 W HCPCS: Mod: HCNC | Performed by: EMERGENCY MEDICINE

## 2019-08-22 PROCEDURE — 81003 URINALYSIS AUTO W/O SCOPE: CPT | Mod: HCNC

## 2019-08-22 PROCEDURE — 84484 ASSAY OF TROPONIN QUANT: CPT | Mod: HCNC

## 2019-08-22 PROCEDURE — 96361 HYDRATE IV INFUSION ADD-ON: CPT | Mod: HCNC

## 2019-08-22 PROCEDURE — 99285 EMERGENCY DEPT VISIT HI MDM: CPT | Mod: 25,HCNC

## 2019-08-22 RX ADMIN — SODIUM CHLORIDE 1000 ML: 0.9 INJECTION, SOLUTION INTRAVENOUS at 03:08

## 2019-08-22 NOTE — ED PROVIDER NOTES
Encounter Date: 8/22/2019    SCRIBE #1 NOTE: I, Humberto Ross, am scribing for, and in the presence of,  Sanket Mota MD. I have scribed the entire note.       History     Chief Complaint   Patient presents with    Fatigue     Reports over the past 2 days has been having increased fatigue, swelling to BLE and moments AMS. Also currently being treated with ampicillin for Urinary infection.      63 year-old female presents to the ED with c/o fatigue. Family at bedside reports increased fatigue with moments of disorientation and increased confusion over the past couple days. Admits to leg swelling and sleep disturbance but denies fever or vomiting. Patient is currently being treated with Ampicillin for UTI.    The history is provided by the patient.     Review of patient's allergies indicates:   Allergen Reactions    (d)-limonene flavor      Other reaction(s): difficult intubation  Other reaction(s): Difficulty breathing    Bactrim [sulfamethoxazole-trimethoprim] Anaphylaxis    Benadryl [diphenhydramine hcl] Shortness Of Breath    Imitrex [sumatriptan succinate] Shortness Of Breath    Topamax [topiramate] Shortness Of Breath    Vancomycin Shortness Of Breath     Rash    Butorphanol tartrate     Darvocet a500 [propoxyphene n-acetaminophen]      Other reaction(s): Difficulty breathing    Fentanyl      Other reaction(s): Vomiting  Other reaction(s): Nausea  Other reaction(s): Itching  swelling    White petrolatum-zinc     Zinc oxide-white petrolatum      Other reaction(s): Difficulty breathing    Latex, natural rubber Itching and Rash    Phenytoin Rash and Other (See Comments)     Trouble breathing     Past Medical History:   Diagnosis Date    Anticoagulant long-term use     Anxiety     Arthritis     Bilateral lower extremity edema     severe chronic    Carotid artery occlusion     Cataract     Coronary artery disease     subtotalled LAD with collateral    Depression     Fever blister      Hypothyroid     Iron deficiency anemia     Lumbar radiculopathy     with chronic pain    Ocular migraine     Sleep apnea     cpap     Past Surgical History:   Procedure Laterality Date    ADENOIDECTOMY      BACK SURGERY      x 12    BIOPSY-BLADDER N/A 3/20/2018    [facility] OR 1ST FLR    BIOPSY-BLADDER N/A 1/26/2017    [facility] by [facility] OR 1ST FLR    CARDIAC CATHETERIZATION  2016    subtotalled LAD with right to left collaterals    CATARACT EXTRACTION W/  INTRAOCULAR LENS IMPLANT Left     Dr Coleman     CYSTOLITHOLAPAXY N/A 6/27/2019    Performed by [facility] at [facility] OR 2ND FLR    CYSTOSCOPY N/A 3/20/2018    [facility] by [facility] OR [facility]ST [facility]LR    CYSTOSCOPY N/A 1/26/2017    [facility] by [facility] OR 1ST FLR    CYSTOSCOPY N/A 11/8/2016    [facility] by [facility] OR 2ND FLR    CYSTOSCOPY, WITH SUPRAPUBIC BLADDER SLING CREATION AUTOLOGOUS N/A 6/27/2019    Performed by [facility] OR 2ND FLR    DISCECTOMY, SPINE, CERVICAL, ANTERIOR APPROACH, WITH FUSION N/A 5/13/2013    Performed by Larry Martinez MD at [facility] OR 2ND FLR    ESOPHAGOGASTRODUODENOSCOPY (EGD) N/A 5/23/2018    Performed by Prince Vance MD at [facility] ENDO (4TH FLR)    ESOPHAGOGASTRODUODENOSCOPY (EGD) N/A 7/21/2017    Performed by [facility] ENDO (4TH FLR)    ESOPHAGOGASTRODUODENOSCOPY (EGD) w/ dilation N/A 8/28/2017    Performed by Prince [facility] at [facility] ENDO (4TH FLR)    FULGURATION-BLADDER N/A 1/26/2017    Performed by [facility] OR 1ST FLR    HEART CATH-LEFT Left 1/7/2016    Performed by Alberto Gee MD at [facility] CATH LAB    HYSTERECTOMY  1975    endometriosis    INJECTION-BOTOX N/A 3/20/2018    Performed by [facility] OR 1ST FLR    INJECTION-BOTOX N/A 1/26/2017    Performed by [facility] OR 1ST FLR     INJECTION-BULKING AGENT URETHRAL  OUTLET N/A 3/20/2018    Performed by [facility] at [facility] OR 1ST FLR    INSERTION-INTRAOCULAR LENS (IOL) Left 11/13/2014    Performed by Sanjana Coleman [facility] at [facility] OR 1ST FLR    INSERTION-SUPRAPUBIC TUBE-OPEN N/A 11/8/2016    Performed by [facility] at [facility] OR 2ND FLR    MANOMETRY-ESOPHAGEAL-WITH IMPEDANCE N/A 4/23/2018    Performed by Robert Menendez MD at [facility] ENDO (4TH FLR)    MYELOGRAM N/A 2/19/2013    Performed by [facility] at [facility] OR 2ND FLR    pain pump placement      SQ Dilaudid Pump managed by Dr. Hillman, Pain Management    PHACOEMULSIFICATION-ASPIRATION-CATARACT Left 11/13/2014    Performed by [facility] at [facility] OR 1ST FLR    ND UPPER GI ENDOSCOPY,DIAGNOSIS [78809 (CPT®)] N/A 7/16/2014    Performed by Prince [facility] at [facility] ENDO (4TH FLR)    SPINAL CORD STIMULATOR REMOVAL      before [place]    SPINE SURGERY  5-13-13    CERVICAL FUSION    TONSILLECTOMY       Family History   Problem Relation Age of Onset    Cancer Mother 55        breast    Cancer Father         esophagus,had laryngectomy    Esophageal cancer Father     Parkinsonism Maternal Grandmother     Tremor Maternal Grandmother     No Known Problems Brother     No Known Problems Brother     Heart disease Maternal Uncle     No Known Problems Sister     No Known Problems Maternal Aunt     No Known Problems Paternal Aunt     No Known Problems Paternal Uncle     No Known Problems Maternal Grandfather     No Known Problems Paternal Grandmother     No Known Problems Paternal Grandfather     Melanoma Neg Hx     Amblyopia Neg Hx     Blindness Neg Hx     Cataracts Neg Hx     Diabetes Neg Hx     Glaucoma Neg Hx     Hypertension Neg Hx     Macular degeneration Neg Hx     Retinal detachment Neg Hx     Strabismus Neg Hx     Stroke Neg Hx     Thyroid disease Neg Hx     Colon cancer Neg Hx      Social History     Tobacco Use    Smoking  status: Never Smoker    Smokeless tobacco: Never Used   Substance Use Topics    Alcohol use: Never     Frequency: Never    Drug use: No     Review of Systems   Constitutional: Positive for fatigue. Negative for chills and fever.   HENT: Negative for rhinorrhea, sore throat and trouble swallowing.    Eyes: Negative for visual disturbance.   Respiratory: Negative for cough and shortness of breath.    Cardiovascular: Positive for leg swelling. Negative for chest pain.   Gastrointestinal: Negative for abdominal pain, diarrhea and vomiting.   Genitourinary: Negative for dysuria and hematuria.   Musculoskeletal: Negative for back pain.   Skin: Negative for rash.   Neurological: Negative for numbness and headaches.   Hematological: Negative for adenopathy.   Psychiatric/Behavioral: Positive for confusion and sleep disturbance.   All other systems reviewed and are negative.      Physical Exam     Initial Vitals [08/22/19 1403]   BP Pulse Resp Temp SpO2   (!) 118/56 (!) 57 20 98 °F (36.7 °C) 98 %      MAP       --         Physical Exam    Nursing note and vitals reviewed.  Constitutional: She appears well-developed and well-nourished. No distress.   HENT:   Head: Normocephalic and atraumatic.   Mouth/Throat: Oropharynx is clear and moist.   Eyes: Conjunctivae and EOM are normal. Pupils are equal, round, and reactive to light.   Neck: Normal range of motion. Neck supple.   Cardiovascular: Regular rhythm, normal heart sounds and intact distal pulses. Bradycardia present.    Pulmonary/Chest: Breath sounds normal. No respiratory distress. She has no wheezes.   Abdominal: Soft. She exhibits no distension. There is tenderness. There is guarding.   Diffuse abdominal tenderness with voluntary guarding   Musculoskeletal: Normal range of motion. She exhibits edema.   Pitting edema LE bilaterally   Neurological: She is alert and oriented to person, place, and time.   Skin: Skin is warm and dry.         ED Course   Procedures  Labs  Reviewed   URINALYSIS - Abnormal; Notable for the following components:       Result Value    Occult Blood UA Trace (*)     All other components within normal limits   CBC W/ AUTO DIFFERENTIAL - Abnormal; Notable for the following components:    Mean Corpuscular Hemoglobin Conc 31.9 (*)     Lymph # 0.9 (*)     All other components within normal limits   COMPREHENSIVE METABOLIC PANEL - Abnormal; Notable for the following components:    Potassium 3.3 (*)     CO2 31 (*)     All other components within normal limits   TSH   CK   TROPONIN I   MAGNESIUM     EKG Readings: (Independently Interpreted)   Rhythm: Sinus Bradycardia. Heart Rate: 45. ST Segments: Normal ST Segments. T Waves: Normal.       Imaging Results          CT Head Without Contrast (Final result)  Result time 08/22/19 16:09:20    Final result by Simoen Rivera MD (08/22/19 16:09:20)                 Impression:      1. No acute intracranial abnormalities, motion limited exam..      Electronically signed by: Simnoe Rivera MD  Date:    08/22/2019  Time:    16:09             Narrative:    EXAMINATION:  CT HEAD WITHOUT CONTRAST    CLINICAL HISTORY:  Confusion/delirium, altered LOC, unexplained;    TECHNIQUE:  Low dose axial images were obtained through the head.  Coronal and sagittal reformations were also performed. Contrast was not administered.    COMPARISON:  11/13/2017    FINDINGS:  Examination is limited secondary to patient motion.  There is generalized cerebral volume loss.  There is hypoattenuation in a periventricular fashion, likely sequela of chronic microvascular ischemic change.  There is no evidence of acute major vascular territory infarct, hemorrhage, or mass.  There is no hydrocephalus.  There are no abnormal extra-axial fluid collections.  The paranasal sinuses and mastoid air cells are clear, and there is no evidence of calvarial fracture.  The visualized soft tissues are unremarkable.                               X-Ray Chest 1 View  (Final result)  Result time 08/22/19 15:11:40    Final result by Elvin Beard MD (08/22/19 15:11:40)                 Impression:      No failure or pneumonia.      Electronically signed by: Evlin Beard  Date:    08/22/2019  Time:    15:11             Narrative:    EXAMINATION:  XR CHEST 1 VIEW    CLINICAL HISTORY:  confusion;    TECHNIQUE:  Single frontal view of the chest was performed.    COMPARISON:  02/23/2019 chest    FINDINGS:  Single AP portable view at 16:36.    There is cervical spine fusion hardware.  There is overlying leads.  There is spine stimulator electrodes projecting at the approximate T10 and T11 level unchanged.    The heart is borderline enlarged probably unchanged.  No pneumothorax or pleural effusion or interstitial edema consolidation or nodule.  No abdominal free air.  No rib fracture or deformity.                                 Medical Decision Making:   Clinical Tests:   Lab Tests: Reviewed and Ordered  Radiological Study: Ordered and Reviewed  ED Management:  63-year-old female who has been slightly confused over the past couple of days.  She is currently on antibiotics for UTI.  Extensive workup done here in the ED which has included a chest x-ray, CT of the head, lab work and urinalysis unremarkable. She is noted to be bradycardic on an EKG performed here, but in reviewing her chart I see that she has had bradycardia on EKG is going back at least for the past 3 years.  She has had no complaints while in the ED and has been awake and alert.  I will discharge her in stable condition advised close follow-up with the primary physician, but also to return here for any worsening of her condition.                      Clinical Impression:       ICD-10-CM ICD-9-CM   1. Altered mental status, unspecified altered mental status type R41.82 780.97   2. Fatigue R53.83 780.79           Disposition:   Disposition: Discharged  Condition: Stable    I, Dr. Sanket Mota, personally performed the  services described in this documentation. All medical record entries made by the scribe were at my direction and in my presence. I have reviewed the chart and agree that the record reflects my personal performance and is accurate and complete. Sanket Castro MD.  5:56 PM 08/22/2019                      Sanket Castro MD  08/22/19 [id number]

## 2019-08-22 NOTE — ED NOTES
Patient complains of patient in bilateral lower extremities and pain in her abdomen with palpation. Patient family at bedside stated she has had confusion for the past two days. Patient has a permeant meadows cathter that was changed out last Wednesday. Patient  stated that she has had increase in confusion the past two days. Patient was scheduled for bladder surgery today and was cancelled due to the patients bilateral lower extremities became extremely swollen and patient altered orientation. 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 MD at bedside for assessment.

## 2019-08-26 ENCOUNTER — TELEPHONE (OUTPATIENT)
Dept: UROLOGY | Facility: CLINIC | Age: 64
End: 2019-08-26

## 2019-08-26 NOTE — TELEPHONE ENCOUNTER
----- Message from Christi Wang LPN sent at 8/26/2019 10:35 AM CDT -----  Contact: pt       ----- Message -----  From: Rita Persaud  Sent: 8/26/2019  10:27 AM  To: [facility] Staff    Type:  Needs Medical Advice    Who Called: pt called to reschedule procedure from 8/22/19 to asap.  Please give pt a call  Would the patient rather a call back or a response via [facility]? Call  Best Call Back Number: [phone]  Additional Information:

## 2019-08-27 ENCOUNTER — TELEPHONE (OUTPATIENT)
Dept: HOME HEALTH SERVICES | Facility: HOSPITAL | Age: 64
End: 2019-08-27

## 2019-08-28 RX ORDER — QUETIAPINE FUMARATE 100 MG/1
TABLET, FILM COATED ORAL
Qty: 30 TABLET | Refills: 2 | Status: SHIPPED | OUTPATIENT
Start: 2019-08-28 | End: 2019-10-29 | Stop reason: SDUPTHER

## 2019-08-29 ENCOUNTER — OFFICE VISIT (OUTPATIENT)
Dept: INTERNAL MEDICINE | Facility: CLINIC | Age: 64
End: 2019-08-29
Payer: MEDICARE

## 2019-08-29 VITALS
DIASTOLIC BLOOD PRESSURE: 58 MMHG | SYSTOLIC BLOOD PRESSURE: 100 MMHG | HEART RATE: 46 BPM | BODY MASS INDEX: 31.07 KG/M2 | HEIGHT: 64 IN

## 2019-08-29 DIAGNOSIS — M46.96 INFLAMMATORY SPONDYLOPATHY OF LUMBAR REGION: ICD-10-CM

## 2019-08-29 DIAGNOSIS — N31.9 NEUROGENIC BLADDER: Chronic | ICD-10-CM

## 2019-08-29 DIAGNOSIS — M47.816 FACET ARTHROPATHY, LUMBAR: ICD-10-CM

## 2019-08-29 DIAGNOSIS — G89.4 CHRONIC PAIN SYNDROME: Primary | Chronic | ICD-10-CM

## 2019-08-29 DIAGNOSIS — N39.0 RECURRENT UTI (URINARY TRACT INFECTION): ICD-10-CM

## 2019-08-29 DIAGNOSIS — R33.9 URINARY RETENTION: Chronic | ICD-10-CM

## 2019-08-29 DIAGNOSIS — F11.20 NARCOTIC DEPENDENCY, CONTINUOUS: Chronic | ICD-10-CM

## 2019-08-29 PROCEDURE — 99999 PR PBB SHADOW E&M-EST. PATIENT-LVL III: CPT | Mod: PBBFAC,HCNC,, | Performed by: INTERNAL MEDICINE

## 2019-08-29 PROCEDURE — G0179 PR HOME HEALTH MD RECERTIFICATION: ICD-10-PCS | Mod: ,,, | Performed by: UROLOGY

## 2019-08-29 PROCEDURE — 3008F PR BODY MASS INDEX (BMI) DOCUMENTED: ICD-10-PCS | Mod: HCNC,CPTII,S$GLB, | Performed by: INTERNAL MEDICINE

## 2019-08-29 PROCEDURE — 99214 PR OFFICE/OUTPT VISIT, EST, LEVL IV, 30-39 MIN: ICD-10-PCS | Mod: HCNC,S$GLB,, | Performed by: INTERNAL MEDICINE

## 2019-08-29 PROCEDURE — 99999 PR PBB SHADOW E&M-EST. PATIENT-LVL III: ICD-10-PCS | Mod: PBBFAC,HCNC,, | Performed by: INTERNAL MEDICINE

## 2019-08-29 PROCEDURE — 99499 RISK ADDL DX/OHS AUDIT: ICD-10-PCS | Mod: HCNC,S$GLB,, | Performed by: INTERNAL MEDICINE

## 2019-08-29 PROCEDURE — G0179 MD RECERTIFICATION HHA PT: HCPCS | Mod: ,,, | Performed by: UROLOGY

## 2019-08-29 PROCEDURE — 99214 OFFICE O/P EST MOD 30 MIN: CPT | Mod: HCNC,S$GLB,, | Performed by: INTERNAL MEDICINE

## 2019-08-29 PROCEDURE — 99499 UNLISTED E&M SERVICE: CPT | Mod: HCNC,S$GLB,, | Performed by: INTERNAL MEDICINE

## 2019-08-29 PROCEDURE — 3008F BODY MASS INDEX DOCD: CPT | Mod: HCNC,CPTII,S$GLB, | Performed by: INTERNAL MEDICINE

## 2019-08-29 NOTE — PROGRESS NOTES
Subjective:       Patient ID: Tasha Hawley is a 64 y.o. female.    Chief Complaint: Follow-up    HPI - Ms Hawley has urologic surgery scheduled for Tuesday and had lots of questions for me about that; deferred to her urologist.  She still has catheter.  She asked to see [facility] pain management, but when I told her they would not refill narcotics for her, she demurred.  She has chronic, longstanding back pain and is dependent on narcotics prescribed for that.  She's not a smoker.  Other medical issues are quiet    PMH:  Chronic insomnia, sleeping has improved.  Chronic low back pain with narcotic use.  Indwelling narcotic pump  History CVA with dysarthria  Neurogenic bladder, with recurrent UTI's.  SP catheter placed Nov 2016  Hypothyroidism, stable  CECI, not on CPAP  CAD, with ACS in Jan 2016 - subtot mid LAD lesion without PCI; ischemic CM with EF 40% and chronic LE edema. Followed by [facility] cardiology  Anxiety and Depression  Chronic constipation, likely d/t ongoing narcotic use  Intermittent nausea  LE dependent edema     Meds: Reviewed and reconciled in EPIC with patient during visit today.    Review of Systems   Constitutional: Negative for fever.   HENT: Negative for congestion.    Respiratory: Negative for shortness of breath.    Cardiovascular: Negative for chest pain.   Gastrointestinal: Negative for abdominal pain.   Genitourinary: Positive for difficulty urinating.   Musculoskeletal: Positive for back pain.   Skin: Negative for rash.   Neurological: Negative for headaches.   Psychiatric/Behavioral: Positive for sleep disturbance.       Objective:      Physical Exam   Constitutional: She is oriented to person, place, and time. She appears well-developed and well-nourished.   Frail woman examined in wheelchair   HENT:   Head: Normocephalic and atraumatic.   Cardiovascular: Normal rate, regular rhythm and normal heart sounds. Exam reveals no gallop and no friction rub.   No murmur  heard.  Pulmonary/Chest: Effort normal and breath sounds normal. No respiratory distress. She has no wheezes. She has no rales. She exhibits no tenderness.   Neurological: She is alert and oriented to person, place, and time.   Skin: Skin is warm and dry. No erythema.   Psychiatric: She has a normal mood and affect.   Nursing note and vitals reviewed.      Assessment:       1. Chronic pain syndrome    2. Facet arthropathy, lumbar    3. Narcotic dependency, continuous    4. Neurogenic bladder    5. Recurrent UTI (urinary tract infection)    6. Urinary retention    7. Inflammatory spondylopathy of lumbar region        Plan:       Tasha was seen today for follow-up.    Diagnoses and all orders for this visit:    Chronic pain syndrome - stable.  Continue with present pain management plan    Facet arthropathy, lumbar    Narcotic dependency, continuous - narcotics supplied by outside pain management    Neurogenic bladder - management per urology    Recurrent UTI (urinary tract infection)    Urinary retention    Inflammatory spondylopathy of lumbar region    rtc prn, or in 3 months    PAPO Hodges MD MPH  Staff Internist

## 2019-08-30 ENCOUNTER — TELEPHONE (OUTPATIENT)
Dept: UROLOGY | Facility: CLINIC | Age: 64
End: 2019-08-30

## 2019-08-30 NOTE — TELEPHONE ENCOUNTER
Called pt to confirm 8am arrival time for procedure on 9/3. Gave pt NPO instructions and gave pt opportunity to ask questions. Pt verbalized understanding.

## 2019-09-02 ENCOUNTER — TELEPHONE (OUTPATIENT)
Dept: UROLOGY | Facility: CLINIC | Age: 64
End: 2019-09-02

## 2019-09-02 DIAGNOSIS — R82.79 POSITIVE URINE CULTURE: Primary | ICD-10-CM

## 2019-09-02 RX ORDER — AMPICILLIN 500 MG/1
500 CAPSULE ORAL EVERY 6 HOURS
Qty: 28 CAPSULE | Refills: 0 | Status: SHIPPED | OUTPATIENT
Start: 2019-09-02 | End: 2019-09-09

## 2019-09-02 RX ORDER — FLUCONAZOLE 200 MG/1
200 TABLET ORAL DAILY
Qty: 7 TABLET | Refills: 0 | Status: SHIPPED | OUTPATIENT
Start: 2019-09-02 | End: 2019-09-09

## 2019-09-02 NOTE — TELEPHONE ENCOUNTER
Called the patient and informed her that ampicillin and diflucan were sent to [facility] in [place] (called to make sure they were open today and they are, until 5 pm with a break for lunch 1:30 to 2 pm.)  Asked that the patient get at least 3 doses today and one in the am of the ampicillin and one of the difulcan today and another in the am

## 2019-09-03 ENCOUNTER — HOSPITAL ENCOUNTER (OUTPATIENT)
Facility: HOSPITAL | Age: 64
Discharge: HOME | End: 2019-09-04
Attending: UROLOGY | Admitting: UROLOGY
Payer: MEDICARE

## 2019-09-03 DIAGNOSIS — N39.3 STRESS INCONTINENCE: Primary | ICD-10-CM

## 2019-09-03 PROCEDURE — D9220A PRA ANESTHESIA: ICD-10-PCS | Mod: HCNC,ANES,, | Performed by: ANESTHESIOLOGY

## 2019-09-03 PROCEDURE — 25000003 PHARM REV CODE 250: Mod: HCNC | Performed by: STUDENT IN AN ORGANIZED HEALTH CARE EDUCATION/TRAINING PROGRAM

## 2019-09-03 PROCEDURE — 37000009 HC ANESTHESIA EA ADD 15 MINS: Mod: HCNC | Performed by: UROLOGY

## 2019-09-03 PROCEDURE — 63600175 PHARM REV CODE 636 W HCPCS: Mod: HCNC | Performed by: NURSE ANESTHETIST, CERTIFIED REGISTERED

## 2019-09-03 PROCEDURE — D9220A PRA ANESTHESIA: Mod: HCNC,ANES,, | Performed by: ANESTHESIOLOGY

## 2019-09-03 PROCEDURE — 36000706: Mod: HCNC | Performed by: UROLOGY

## 2019-09-03 PROCEDURE — 25000003 PHARM REV CODE 250: Mod: HCNC | Performed by: NURSE ANESTHETIST, CERTIFIED REGISTERED

## 2019-09-03 PROCEDURE — 71000033 HC RECOVERY, INTIAL HOUR: Mod: HCNC | Performed by: UROLOGY

## 2019-09-03 PROCEDURE — 63600175 PHARM REV CODE 636 W HCPCS: Mod: HCNC | Performed by: STUDENT IN AN ORGANIZED HEALTH CARE EDUCATION/TRAINING PROGRAM

## 2019-09-03 PROCEDURE — 25000003 PHARM REV CODE 250: Mod: HCNC

## 2019-09-03 PROCEDURE — 57288 REPAIR BLADDER DEFECT: CPT | Mod: HCNC,,, | Performed by: UROLOGY

## 2019-09-03 PROCEDURE — 63600175 PHARM REV CODE 636 W HCPCS: Mod: HCNC | Performed by: UROLOGY

## 2019-09-03 PROCEDURE — 94799 UNLISTED PULMONARY SVC/PX: CPT | Mod: HCNC

## 2019-09-03 PROCEDURE — 25000003 PHARM REV CODE 250: Mod: HCNC | Performed by: UROLOGY

## 2019-09-03 PROCEDURE — G0378 HOSPITAL OBSERVATION PER HR: HCPCS | Mod: HCNC

## 2019-09-03 PROCEDURE — 37000008 HC ANESTHESIA 1ST 15 MINUTES: Mod: HCNC | Performed by: UROLOGY

## 2019-09-03 PROCEDURE — 94761 N-INVAS EAR/PLS OXIMETRY MLT: CPT | Mod: HCNC

## 2019-09-03 PROCEDURE — 36000707: Mod: HCNC | Performed by: UROLOGY

## 2019-09-03 PROCEDURE — 57288 PR SLING OPER STRES INCONTINENCE: ICD-10-PCS | Mod: HCNC,,, | Performed by: UROLOGY

## 2019-09-03 RX ORDER — LIDOCAINE HCL/PF 100 MG/5ML
SYRINGE (ML) INTRAVENOUS
Status: DISCONTINUED | OUTPATIENT
Start: 2019-09-03 | End: 2019-09-03

## 2019-09-03 RX ORDER — NEOSTIGMINE METHYLSULFATE 1 MG/ML
INJECTION, SOLUTION INTRAVENOUS
Status: DISCONTINUED | OUTPATIENT
Start: 2019-09-03 | End: 2019-09-03

## 2019-09-03 RX ORDER — SODIUM CHLORIDE 0.9 % (FLUSH) 0.9 %
10 SYRINGE (ML) INJECTION
Status: DISCONTINUED | OUTPATIENT
Start: 2019-09-03 | End: 2019-09-04 | Stop reason: HOSPADM

## 2019-09-03 RX ORDER — ONDANSETRON 2 MG/ML
INJECTION INTRAMUSCULAR; INTRAVENOUS
Status: DISCONTINUED | OUTPATIENT
Start: 2019-09-03 | End: 2019-09-03

## 2019-09-03 RX ORDER — HYDROMORPHONE HYDROCHLORIDE 1 MG/ML
0.2 INJECTION, SOLUTION INTRAMUSCULAR; INTRAVENOUS; SUBCUTANEOUS EVERY 5 MIN PRN
Status: DISCONTINUED | OUTPATIENT
Start: 2019-09-03 | End: 2019-09-03 | Stop reason: HOSPADM

## 2019-09-03 RX ORDER — ASPIRIN 81 MG/1
81 TABLET ORAL DAILY
Status: DISCONTINUED | OUTPATIENT
Start: 2019-09-04 | End: 2019-09-04 | Stop reason: HOSPADM

## 2019-09-03 RX ORDER — LIDOCAINE HYDROCHLORIDE 10 MG/ML
INJECTION, SOLUTION EPIDURAL; INFILTRATION; INTRACAUDAL; PERINEURAL
Status: DISCONTINUED | OUTPATIENT
Start: 2019-09-03 | End: 2019-09-03 | Stop reason: HOSPADM

## 2019-09-03 RX ORDER — SODIUM CHLORIDE 9 MG/ML
INJECTION, SOLUTION INTRAVENOUS CONTINUOUS
Status: DISCONTINUED | OUTPATIENT
Start: 2019-09-03 | End: 2019-09-03

## 2019-09-03 RX ORDER — PANTOPRAZOLE SODIUM 40 MG/1
40 TABLET, DELAYED RELEASE ORAL DAILY
Status: DISCONTINUED | OUTPATIENT
Start: 2019-09-03 | End: 2019-09-04 | Stop reason: HOSPADM

## 2019-09-03 RX ORDER — LIDOCAINE HYDROCHLORIDE 10 MG/ML
1 INJECTION, SOLUTION EPIDURAL; INFILTRATION; INTRACAUDAL; PERINEURAL ONCE
Status: COMPLETED | OUTPATIENT
Start: 2019-09-03 | End: 2019-09-03

## 2019-09-03 RX ORDER — POLYETHYLENE GLYCOL 3350 17 G/17G
17 POWDER, FOR SOLUTION ORAL DAILY
Status: DISCONTINUED | OUTPATIENT
Start: 2019-09-03 | End: 2019-09-04 | Stop reason: HOSPADM

## 2019-09-03 RX ORDER — POTASSIUM CITRATE 10 MEQ/1
10 TABLET, EXTENDED RELEASE ORAL
Status: DISCONTINUED | OUTPATIENT
Start: 2019-09-03 | End: 2019-09-04 | Stop reason: HOSPADM

## 2019-09-03 RX ORDER — GLYCOPYRROLATE 0.2 MG/ML
INJECTION INTRAMUSCULAR; INTRAVENOUS
Status: DISCONTINUED | OUTPATIENT
Start: 2019-09-03 | End: 2019-09-03

## 2019-09-03 RX ORDER — BUPIVACAINE HYDROCHLORIDE 2.5 MG/ML
INJECTION, SOLUTION EPIDURAL; INFILTRATION; INTRACAUDAL
Status: DISCONTINUED | OUTPATIENT
Start: 2019-09-03 | End: 2019-09-03 | Stop reason: HOSPADM

## 2019-09-03 RX ORDER — ROCURONIUM BROMIDE 10 MG/ML
INJECTION, SOLUTION INTRAVENOUS
Status: DISCONTINUED | OUTPATIENT
Start: 2019-09-03 | End: 2019-09-03

## 2019-09-03 RX ORDER — SODIUM CHLORIDE 9 MG/ML
INJECTION, SOLUTION INTRAVENOUS CONTINUOUS
Status: DISCONTINUED | OUTPATIENT
Start: 2019-09-03 | End: 2019-09-04

## 2019-09-03 RX ORDER — HYDROMORPHONE HYDROCHLORIDE 1 MG/ML
INJECTION, SOLUTION INTRAMUSCULAR; INTRAVENOUS; SUBCUTANEOUS
Status: DISPENSED
Start: 2019-09-03 | End: 2019-09-03

## 2019-09-03 RX ORDER — FLUCONAZOLE 2 MG/ML
200 INJECTION, SOLUTION INTRAVENOUS
Status: COMPLETED | OUTPATIENT
Start: 2019-09-03 | End: 2019-09-03

## 2019-09-03 RX ORDER — FUROSEMIDE 40 MG/1
40 TABLET ORAL DAILY PRN
Status: DISCONTINUED | OUTPATIENT
Start: 2019-09-03 | End: 2019-09-04 | Stop reason: HOSPADM

## 2019-09-03 RX ORDER — FLUCONAZOLE 2 MG/ML
200 INJECTION, SOLUTION INTRAVENOUS
Status: DISCONTINUED | OUTPATIENT
Start: 2019-09-04 | End: 2019-09-04 | Stop reason: HOSPADM

## 2019-09-03 RX ORDER — ATORVASTATIN CALCIUM 20 MG/1
80 TABLET, FILM COATED ORAL DAILY
Status: DISCONTINUED | OUTPATIENT
Start: 2019-09-04 | End: 2019-09-04 | Stop reason: HOSPADM

## 2019-09-03 RX ORDER — OXYCODONE HYDROCHLORIDE 10 MG/1
10 TABLET ORAL EVERY 4 HOURS PRN
Status: DISCONTINUED | OUTPATIENT
Start: 2019-09-03 | End: 2019-09-04 | Stop reason: HOSPADM

## 2019-09-03 RX ORDER — ONDANSETRON 8 MG/1
8 TABLET, ORALLY DISINTEGRATING ORAL EVERY 6 HOURS PRN
Status: DISCONTINUED | OUTPATIENT
Start: 2019-09-03 | End: 2019-09-04 | Stop reason: HOSPADM

## 2019-09-03 RX ORDER — BACITRACIN 50000 [IU]/1
INJECTION, POWDER, FOR SOLUTION INTRAMUSCULAR
Status: DISCONTINUED | OUTPATIENT
Start: 2019-09-03 | End: 2019-09-03 | Stop reason: HOSPADM

## 2019-09-03 RX ORDER — PROMETHAZINE HYDROCHLORIDE 25 MG/1
25 TABLET ORAL EVERY 6 HOURS PRN
Status: DISCONTINUED | OUTPATIENT
Start: 2019-09-03 | End: 2019-09-04 | Stop reason: HOSPADM

## 2019-09-03 RX ORDER — OXYCODONE HYDROCHLORIDE 5 MG/1
TABLET ORAL
Status: COMPLETED
Start: 2019-09-03 | End: 2019-09-03

## 2019-09-03 RX ORDER — HYDROMORPHONE HYDROCHLORIDE 2 MG/ML
INJECTION, SOLUTION INTRAMUSCULAR; INTRAVENOUS; SUBCUTANEOUS
Status: DISCONTINUED | OUTPATIENT
Start: 2019-09-03 | End: 2019-09-03

## 2019-09-03 RX ORDER — ACETAMINOPHEN 500 MG
1000 TABLET ORAL EVERY 6 HOURS
Status: DISCONTINUED | OUTPATIENT
Start: 2019-09-03 | End: 2019-09-04 | Stop reason: HOSPADM

## 2019-09-03 RX ORDER — PROPOFOL 10 MG/ML
VIAL (ML) INTRAVENOUS
Status: DISCONTINUED | OUTPATIENT
Start: 2019-09-03 | End: 2019-09-03

## 2019-09-03 RX ORDER — OXYCODONE HYDROCHLORIDE 5 MG/1
5 TABLET ORAL EVERY 4 HOURS PRN
Status: DISCONTINUED | OUTPATIENT
Start: 2019-09-03 | End: 2019-09-04 | Stop reason: HOSPADM

## 2019-09-03 RX ORDER — SODIUM CHLORIDE 0.9 % (FLUSH) 0.9 %
10 SYRINGE (ML) INJECTION
Status: DISCONTINUED | OUTPATIENT
Start: 2019-09-03 | End: 2019-09-03 | Stop reason: HOSPADM

## 2019-09-03 RX ORDER — TRAZODONE HYDROCHLORIDE 100 MG/1
300 TABLET ORAL NIGHTLY PRN
Status: DISCONTINUED | OUTPATIENT
Start: 2019-09-03 | End: 2019-09-04 | Stop reason: HOSPADM

## 2019-09-03 RX ORDER — QUETIAPINE FUMARATE 100 MG/1
100 TABLET, FILM COATED ORAL NIGHTLY
Status: DISCONTINUED | OUTPATIENT
Start: 2019-09-03 | End: 2019-09-04 | Stop reason: HOSPADM

## 2019-09-03 RX ORDER — FLUOXETINE HYDROCHLORIDE 20 MG/1
60 CAPSULE ORAL DAILY
Status: DISCONTINUED | OUTPATIENT
Start: 2019-09-04 | End: 2019-09-04 | Stop reason: HOSPADM

## 2019-09-03 RX ORDER — EPHEDRINE SULFATE 50 MG/ML
INJECTION, SOLUTION INTRAVENOUS
Status: DISCONTINUED | OUTPATIENT
Start: 2019-09-03 | End: 2019-09-03

## 2019-09-03 RX ORDER — MIDAZOLAM HYDROCHLORIDE 5 MG/ML
INJECTION INTRAMUSCULAR; INTRAVENOUS
Status: DISCONTINUED | OUTPATIENT
Start: 2019-09-03 | End: 2019-09-03

## 2019-09-03 RX ADMIN — ONDANSETRON 4 MG: 2 INJECTION INTRAMUSCULAR; INTRAVENOUS at 02:09

## 2019-09-03 RX ADMIN — HYDROMORPHONE HYDROCHLORIDE 0.2 MG: 2 INJECTION, SOLUTION INTRAMUSCULAR; INTRAVENOUS; SUBCUTANEOUS at 01:09

## 2019-09-03 RX ADMIN — TRAZODONE HYDROCHLORIDE 300 MG: 100 TABLET ORAL at 10:09

## 2019-09-03 RX ADMIN — HYDROMORPHONE HYDROCHLORIDE 0.2 MG: 2 INJECTION, SOLUTION INTRAMUSCULAR; INTRAVENOUS; SUBCUTANEOUS at 03:09

## 2019-09-03 RX ADMIN — SODIUM CHLORIDE: 0.9 INJECTION, SOLUTION INTRAVENOUS at 03:09

## 2019-09-03 RX ADMIN — HYDROMORPHONE HYDROCHLORIDE 0.2 MG: 1 INJECTION, SOLUTION INTRAMUSCULAR; INTRAVENOUS; SUBCUTANEOUS at 04:09

## 2019-09-03 RX ADMIN — GLYCOPYRROLATE 0.6 MG: 0.2 INJECTION, SOLUTION INTRAMUSCULAR; INTRAVENOUS at 02:09

## 2019-09-03 RX ADMIN — EPHEDRINE SULFATE 10 MG: 50 INJECTION, SOLUTION INTRAMUSCULAR; INTRAVENOUS; SUBCUTANEOUS at 12:09

## 2019-09-03 RX ADMIN — OXYCODONE HYDROCHLORIDE 10 MG: 10 TABLET ORAL at 03:09

## 2019-09-03 RX ADMIN — FLUCONAZOLE 200 MG: 2 INJECTION, SOLUTION INTRAVENOUS at 09:09

## 2019-09-03 RX ADMIN — ROCURONIUM BROMIDE 30 MG: 10 INJECTION, SOLUTION INTRAVENOUS at 11:09

## 2019-09-03 RX ADMIN — MIDAZOLAM 2 MG: 5 INJECTION INTRAMUSCULAR; INTRAVENOUS at 11:09

## 2019-09-03 RX ADMIN — AMPICILLIN SODIUM 1 G: 1 INJECTION, POWDER, FOR SOLUTION INTRAMUSCULAR; INTRAVENOUS at 06:09

## 2019-09-03 RX ADMIN — OXYCODONE HYDROCHLORIDE 10 MG: 10 TABLET ORAL at 11:09

## 2019-09-03 RX ADMIN — OXYCODONE HYDROCHLORIDE 10 MG: 10 TABLET ORAL at 07:09

## 2019-09-03 RX ADMIN — HYDROMORPHONE HYDROCHLORIDE 0.2 MG: 1 INJECTION, SOLUTION INTRAMUSCULAR; INTRAVENOUS; SUBCUTANEOUS at 03:09

## 2019-09-03 RX ADMIN — INDIGO CARMINE 5 ML: 8 INJECTION, SOLUTION INTRAMUSCULAR; INTRAVENOUS at 01:09

## 2019-09-03 RX ADMIN — GLYCOPYRROLATE 0.2 MG: 0.2 INJECTION, SOLUTION INTRAMUSCULAR; INTRAVENOUS at 11:09

## 2019-09-03 RX ADMIN — LIDOCAINE HYDROCHLORIDE 60 MG: 20 INJECTION, SOLUTION INTRAVENOUS at 11:09

## 2019-09-03 RX ADMIN — PANTOPRAZOLE SODIUM 40 MG: 40 TABLET, DELAYED RELEASE ORAL at 03:09

## 2019-09-03 RX ADMIN — LIDOCAINE HYDROCHLORIDE 2 MG: 10 INJECTION, SOLUTION EPIDURAL; INFILTRATION; INTRACAUDAL; PERINEURAL at 09:09

## 2019-09-03 RX ADMIN — GENTAMICIN SULFATE 160 MG: 40 INJECTION, SOLUTION INTRAMUSCULAR; INTRAVENOUS at 12:09

## 2019-09-03 RX ADMIN — PROPOFOL 160 MG: 10 INJECTION, EMULSION INTRAVENOUS at 11:09

## 2019-09-03 RX ADMIN — OXYCODONE HYDROCHLORIDE 10 MG: 5 TABLET ORAL at 03:09

## 2019-09-03 RX ADMIN — AMPICILLIN SODIUM 1 G: 1 INJECTION, POWDER, FOR SOLUTION INTRAMUSCULAR; INTRAVENOUS at 12:09

## 2019-09-03 RX ADMIN — SODIUM CHLORIDE: 0.9 INJECTION, SOLUTION INTRAVENOUS at 02:09

## 2019-09-03 RX ADMIN — ACETAMINOPHEN 1000 MG: 500 TABLET ORAL at 07:09

## 2019-09-03 RX ADMIN — NEOSTIGMINE METHYLSULFATE 6 MG: 1 INJECTION INTRAVENOUS at 02:09

## 2019-09-03 RX ADMIN — HYDROMORPHONE HYDROCHLORIDE 0.2 MG: 2 INJECTION, SOLUTION INTRAMUSCULAR; INTRAVENOUS; SUBCUTANEOUS at 02:09

## 2019-09-03 RX ADMIN — SODIUM CHLORIDE: 0.9 INJECTION, SOLUTION INTRAVENOUS at 09:09

## 2019-09-03 RX ADMIN — HYDROMORPHONE HYDROCHLORIDE 0.4 MG: 2 INJECTION, SOLUTION INTRAMUSCULAR; INTRAVENOUS; SUBCUTANEOUS at 11:09

## 2019-09-03 RX ADMIN — QUETIAPINE FUMARATE 100 MG: 100 TABLET ORAL at 10:09

## 2019-09-03 NOTE — OP NOTE
[facility]  Operative Note    Date: 09/03/2019    Pre-Op Diagnosis:   1. Stress Urinary Incontinence  2. Urge urinary incontinence  3. S/P suprapubic tube placement     Patient Active Problem List   Diagnosis    Generalized anxiety disorder    Iron deficiency anemia    Major depressive disorder, recurrent, mild    Chronic pain syndrome    Cervical myelopathy    Narcotic dependency, continuous    Thoracic aorta atherosclerosis    Drug-induced constipation    GERD (gastroesophageal reflux disease)    Coronary artery disease involving native coronary artery of native heart without angina pectoris    Neurogenic bladder    Urinary retention    Frequent falls    Weight loss, unintentional    Primary hypothyroidism    Lymphedema of both lower extremities    Scoliosis deformity of spine    S/P insertion of spinal cord stimulator    S/P insertion of intrathecal pump    Paresthesia of both lower extremities    Degenerative disc disease, lumbar    Osteoarthritis of spine with radiculopathy, lumbar region    Facet arthropathy, lumbar    Bilateral carotid artery disease    Insomnia due to medical condition    Suprapubic catheter    Ischemic cardiomyopathy    Esophageal dysphagia    Recurrent UTI (urinary tract infection)    Urinary tract infection    Dyspnea    Hypotension    Costochondritis    Mixed stress and urge urinary incontinence    Preop cardiovascular exam    Chronic venous insufficiency    Suprapubic catheter dysfunction    Inflammatory spondylopathy of lumbar region    Stress incontinence       Post-Op Diagnosis: same    Procedure(s) Performed:   1. Autologous fascial sling  2. Cystoscopy with cystolitholapaxy     Specimen(s): none    Staff Surgeon: Shireen Mayo MD    Assistant Surgeon: Michelle Yi MD    Anesthesia: General endotracheal anesthesia    Indications: Tasha Hawley is a 64 y.o. female with stress urinary incontinence.     Findings:  1.   Autologous fascial sling performed in the standard fashion.   2.  Cysto revealed good coaptation of the urethra after sling placement, small stone debris removed with scope    3.  No bladder or urethral perforations.      Estimated Blood Loss: 20 cc    Drains: 20 Fr suprapubic tube    Procedure in Detail:  After informed consent was obtained, the patient was taken to the operating room and placed under general anesthesia. Pre-operative antibiotics were administered 30 minutes prior to planned incision time. The patient was placed in dorsal lithotomy and prepped and draped in the usual sterile fashion. Her 20 Fr SPT was exchanged and a 16 F urethral meadows was placed. Time out was confirmed.    A Pfannenstiel incision was made sharply with a 15 blade scalpel thru the epidermis superior to the SPT. Bovie cautery was used to dissect  the subcutaneous tissue until the anterior rectus sheath was identified. Once the fascial plane was identified, an area of the rectus fascia was cleared bluntly and with electrocautery. Bovie cautery was then used to harvest a graft measuring 2 cm by 6 cm in length. A PDS suture was placed through the graft at the lateral edges and soaked in antibiotic solution. The fascia was then closed with 1 PDS in a running fashion. The subcutaneous tissue was closed with 3-0 vicryl in a running fashion. The skin was closed with 4-0 monocryl in a running subcuticular fashion.     A separate incision was made inferior to the SPT just above the pubic bone. Bovie electrocautery was used to dissect down to the level of the fascia.     We then directed our attention to the vaginal portion of the procedure.  An allis clamp was placed on the vaginal epithelium just inferior the urethral meatus.  The mid-urethra was palpated with the meadows balloon on tension to aid in identification of the bladder neck. Hydro-dissection of the vaginal epithelium was then performed with 1%lidocaine with epinephrine.  A  vertical incision was made sharply thru vaginal epithelium using a 15 blade scalpel. Metzenbaum scissors were used to carry this dissection to the endopelvic fascia.    A Stamey needle  was then used to pass the PDS suture and ends of the autologous fascial graft from periurethral space to the retropubic space.     The meadows was removed and cystoscopy was performed.  Inspection of the bladder and urethra showed no injury. The ureteral orifices were in the normal anatomic position bilaterally and had strong eflux.  There was good coaptation of the urethra. The scope was removed.     The sling was then tensioned appropriately and the two ends were tied down to each other.     The vaginal epithelium was closed with a 2-0 vicryl in an interrupted fashion. The skin incision was again closed in two layers with 3-0 vicryl and 4-0 monocryl.     Steri strips were applied to both skin incisions.      A vaginal pack was placed. The patient tolerated the procedure well and was transferred back to recovery in stable condition.     Disposition: The patient will remain on the urology service overnight for observation.     I was present for the entire case and agree with the above note.

## 2019-09-03 NOTE — TRANSFER OF CARE
"Anesthesia Transfer of Care Note    Patient: Tasha Hawley    Procedure(s) Performed: Procedure(s) (LRB):  CYSTOSCOPY, WITH SUPRAPUBIC BLADDER SLING CREATION (N/A)  CYSTOLITHOLAPAXY (N/A)    Patient location: PACU    Anesthesia Type: general    Transport from OR: Transported from OR on 6-10 L/min O2 by face mask with adequate spontaneous ventilation    Post pain: adequate analgesia    Post assessment: tolerated procedure well and no apparent anesthetic complications    Post vital signs: stable    Level of consciousness: awake, alert and oriented    Nausea/Vomiting: no nausea/vomiting    Complications: none    Transfer of care protocol was followed      Last vitals:   Visit Vitals  BP (!) 112/59 (BP Location: Left arm, Patient Position: Lying)   Pulse 70   Temp 36.7 °C (98 °F) (Axillary)   Resp 10   Ht 5' 4" (1.626 m)   Wt 81.6 kg (180 lb)   LMP  (LMP Unknown)   SpO2 100%   BMI 30.90 kg/m²     "

## 2019-09-03 NOTE — PLAN OF CARE
Problem: Adult Inpatient Plan of Care  Goal: Plan of Care Review  Outcome: Ongoing (interventions implemented as appropriate)  Vital signs stable. Afebrile. Alert, oriented and following commands.Pt complaining of pain 9/10 but states that this is her home baseline. Suprapubic cath remains intact and draining. IVF infusing per MD order. Vaginal packing remains intact. Steristrips to abdomen remain CDI. POC reviewed with pt and . Understanding verbalized.

## 2019-09-03 NOTE — H&P
Urology [facility] [place]  Staff: Shireen Mayo MD    Is this patient in a research study? No    CC: JUNAID    HPI:  Tasha Hawley is a 64 y.o. female with a SPT in place and JUNAID. She presents today for autologous fascial sling. She is without complaints today. Has been taking prescribed antibiotics. Has seen neurosurgery and is cleared for surgery.      ROS:  Neg except per HPI    Past Medical History:   Diagnosis Date    Anticoagulant long-term use     Anxiety     Arthritis     Bilateral lower extremity edema     severe chronic    Carotid artery occlusion     Cataract     Coronary artery disease     subtotalled LAD with collateral    Depression     Fever blister     Hypothyroid     Iron deficiency anemia     Lumbar radiculopathy     with chronic pain    Ocular migraine     Sleep apnea     cpap       Past Surgical History:   Procedure Laterality Date    ADENOIDECTOMY      BACK SURGERY      x 12    BIOPSY-BLADDER N/A 3/20/2018    Performed by [facility]ireen Mayo MD at [facility] OR 1ST FLR    BIOPSY-BLADDER N/A 1/26/2017    Performed by [facility]en [facility] MD at [facility] OR 1ST FLR    CARDIAC CATHETERIZATION  2016    subtotalled LAD with right to left collaterals    CATARACT EXTRACTION W/  INTRAOCULAR LENS IMPLANT Left     Dr Coleman     CYSTOLITHOLAPAXY N/A 6/27/2019    Performed by Shireen Mayo MD at [facility] OR 2ND FLR    CYSTOSCOPY N/A 3/20/2018    Performed by Sh[facility]en [facility] MD at [facility] OR 1ST FLR    CYSTOSCOPY N/A 1/26/2017    Performed by Sh[facility]en Mayo MD at [facility] OR 1ST FLR    CYSTOSCOPY N/A 11/8/2016    Performed by Sh[facility]en Mayo MD at [facility] OR 2ND FLR    CYSTOSCOPY, WITH SUPRAPUBIC BLADDER SLING CREATION AUTOLOGOUS N/A 6/27/2019    Performed by Shireen Mayo MD at [facility] OR 2ND FLR    DISCECTOMY, SPINE, CERVICAL, ANTERIOR APPROACH, WITH FUSION N/A 5/13/2013    Performed by Larry Martinez MD at [facility] OR 2ND FLR    ESOPHAGOGASTRODUODENOSCOPY (EGD) N/A 5/23/2018    Performed by Prince DANIELS  MD Pinky at [facility] ENDO (4TH FLR)    ESOPHAGOGASTRODUODENOSCOPY (EGD) N/A 7/21/2017    Performed by Prince Vance MD at [facility] ENDO (4TH FLR)    ESOPHAGOGASTRODUODENOSCOPY (EGD) w/ dilation N/A 8/28/2017    Performed by Prince Vance MD at [facility] ENDO (4TH FLR)    FULGURATION-BLADDER N/A 1/26/2017    Performed by Shireen Mayo MD at [facility] OR 1ST FLR    HEART CATH-LEFT Left 1/7/2016    Performed by Alberto Gee MD at [facility] CATH LAB    HYSTERECTOMY  1975    endometriosis    INJECTION-BOTOX N/A 3/20/2018    Performed by Shireen Mayo MD at [facility] OR 1ST FLR    INJECTION-BOTOX N/A 1/26/2017    Performed by Shireen Mayo MD at [facility] OR 1ST FLR    INJECTION-BULKING AGENT URETHRAL  OUTLET N/A 3/20/2018    Performed by Shireen Mayo MD at [facility] OR 1ST FLR    INSERTION-INTRAOCULAR LENS (IOL) Left 11/13/2014    Performed by Sanjana Coleman MD at [facility] OR 1ST FLR    INSERTION-SUPRAPUBIC TUBE-OPEN N/A 11/8/2016    Performed by Shireen Mayo MD at [facility] OR 2ND FLR    MANOMETRY-ESOPHAGEAL-WITH IMPEDANCE N/A 4/23/2018    Performed by Robert [facility]endez [facility] at [facility] (4[facility] FLR)    MYELOGRAM N/A 2/19/2013    Performed by [facility] Diagnostic Provider at [facility] OR 2ND FLR    pain pump placement      SQ Dilaudid Pump managed by Dr. Hillman, Pain Management    PHACOEMULSIFICATION-ASPIRATION-CATARACT Left 11/13/2014    Performed by Sanjana Coleman MD at [facility] OR 1ST FLR    HI UPPER GI ENDOSCOPY,DIAGNOSIS [49368 (CPT®)] N/A 7/16/2014    Performed by Prince Vance MD at [facility] ENDO (4TH FLR)    SPINAL CORD STIMULATOR REMOVAL      before Larissa    SPINE SURGERY  5-13-13    CERVICAL FUSION    TONSILLECTOMY         Social History     Socioeconomic History    Marital status:    Tobacco Use    Smoking status: Never Smoker    Smokeless tobacco: Never Used   Substance and Sexual Activity    Alcohol use: Never     Frequency: Never    Drug use: No    Sexual activity: Never       Family History    Problem Relation Age of Onset    Cancer Mother 55        breast    Cancer Father         esophagus,had laryngectomy    Esophageal cancer Father     Parkinsonism Maternal Grandmother     Tremor Maternal Grandmother     Heart disease Maternal Uncle        Review of patient's allergies indicates:   Allergen Reactions    (d)-limonene flavor      Other reaction(s): difficult intubation  Other reaction(s): Difficulty breathing    Bactrim [sulfamethoxazole-trimethoprim] Anaphylaxis    Benadryl [diphenhydramine hcl] Shortness Of Breath    Imitrex [sumatriptan succinate] Shortness Of Breath    Topamax [topiramate] Shortness Of Breath    Vancomycin Shortness Of Breath     Rash    Butorphanol tartrate     Darvocet a500 [propoxyphene n-acetaminophen]      Other reaction(s): Difficulty breathing    Fentanyl      Other reaction(s): Vomiting  Other reaction(s): Nausea  Other reaction(s): Itching  swelling    White petrolatum-zinc     Zinc oxide-white petrolatum      Other reaction(s): Difficulty breathing    Latex, natural rubber Itching and Rash    Phenytoin Rash and Other (See Comments)     Trouble breathing       No current facility-administered medications on file prior to encounter.      Current Outpatient Medications on File Prior to Encounter   Medication Sig Dispense Refill    aspirin (ECOTRIN) 81 MG EC tablet Take 1 tablet (81 mg total) by mouth once daily.  0    atorvastatin (LIPITOR) 80 MG tablet TAKE 1 TABLET BY MOUTH DAILY 90 tablet 3    butalbital-acetaminophen-caffeine -40 mg (FIORICET, ESGIC) -40 mg per tablet       cholecalciferol, vitamin D3, (VITAMIN D3) 5,000 unit Tab Take 5,000 Units by mouth once daily.      docusate sodium (COLACE) 100 MG capsule Take 1 capsule (100 mg total) by mouth 3 (three) times daily as needed for constipation. 180 capsule 3    FLUoxetine 20 MG capsule Take 3 capsules (60 mg total) by mouth once daily. 270 capsule 3    furosemide (LASIX) 40 MG  "tablet Take 1 tablet (40 mg total) by mouth once daily. (Patient taking differently: Take 40 mg by mouth daily as needed (SWELLING). ) 90 tablet 3    hydrocodone-acetaminophen 10-325mg (NORCO)  mg Tab Take 2 tablets by mouth every 4 (four) hours as needed for Pain.       levothyroxine (SYNTHROID) 125 MCG tablet Take 1 tablet (125 mcg total) by mouth before breakfast. 90 tablet 3    oxybutynin (DITROPAN XL) 15 MG TR24 Take 1 tablet (15 mg total) by mouth once daily. 30 tablet 11    pantoprazole (PROTONIX) 40 MG tablet Take 1 tablet (40 mg total) by mouth once daily. 90 tablet 3    potassium citrate (UROCIT-K) 10 mEq (1,080 mg) TbSR Take 1 tablet (10 mEq total) by mouth 3 (three) times daily with meals. 90 tablet 11    QUEtiapine (SEROQUEL) 100 MG Tab Take 1 tablet (100 mg total) by mouth every evening. 90 tablet 3    traZODone (DESYREL) 100 MG tablet Take 3 tablets (300 mg total) by mouth every evening. 270 tablet 1    lidocaine (LIDODERM) 5 % APPLY 1 PATCH DAILY AND WEAR FOR A MAXIMUM OF 12 HOURS  5    promethazine (PHENERGAN) 25 MG tablet Take 25 mg by mouth every 6 (six) hours as needed for Nausea.      SENNOSIDES (SENNA LAX ORAL) Take 20 mg by mouth every evening.       [DISCONTINUED] lamotrigine (LAMICTAL) 25 MG tablet Take 1 tablet (25 mg total) by mouth once daily. 30 tablet 6       Anticoagulation:  No    Physical Exam:  Estimated body mass index is 30.9 kg/m² as calculated from the following:    Height as of this encounter: 5' 4" (1.626 m).    Weight as of this encounter: 81.6 kg (180 lb).    General: No acute distress, well developed. AAOx3  Head: Normocephalic, Atraumatic  Lungs: normal respiratory effort, no respiratory distress, no wheezes  CV: regular rate, 2+ pulses  Abdomen: soft, non-tender, non-distended  Skin: skin color, texture, turgor normal.  Neurologic: no focal deficits, sensation intact    Labs:      Lab Results   Component Value Date    WBC 4.97 08/22/2019    HGB 12.0 " 08/22/2019    HCT 37.6 08/22/2019    MCV 86 08/22/2019     08/22/2019       BMP  Lab Results   Component Value Date     08/22/2019    K 3.3 (L) 08/22/2019    CL 97 08/22/2019    CO2 31 (H) 08/22/2019    BUN 11 08/22/2019    CREATININE 0.8 08/22/2019    CALCIUM 9.7 08/22/2019    ANIONGAP 13 08/22/2019    ESTGFRAFRICA >60 08/22/2019    EGFRNONAA >60 08/22/2019         Assessment: Tasha Hawley is a 64 y.o. female with JUNAID with spt in place secondary to mixed urinary incontinence.     Plan:     1. To OR today for autologous pubovaginal sling and cystolitholapaxy   2. Consents signed   3. I have explained the risk, benefits, and alternatives of the procedure in detail. The patient voices understanding and all questions have been answered. The patient agrees to proceed as planned.       Michelle Yi MD     Patient seen in holding.  No changes in clinical condition.  Proceed with planned procedure.

## 2019-09-04 VITALS
BODY MASS INDEX: 30.73 KG/M2 | TEMPERATURE: 98 F | HEART RATE: 56 BPM | RESPIRATION RATE: 16 BRPM | HEIGHT: 64 IN | SYSTOLIC BLOOD PRESSURE: 94 MMHG | OXYGEN SATURATION: 98 % | WEIGHT: 180 LBS | DIASTOLIC BLOOD PRESSURE: 46 MMHG

## 2019-09-04 LAB
ANION GAP SERPL CALC-SCNC: 7 MMOL/L (ref 8–16)
BASOPHILS # BLD AUTO: 0.01 K/UL (ref 0–0.2)
BASOPHILS # BLD AUTO: 0.02 K/UL (ref 0–0.2)
BASOPHILS NFR BLD: 0.2 % (ref 0–1.9)
BASOPHILS NFR BLD: 0.3 % (ref 0–1.9)
BUN SERPL-MCNC: 7 MG/DL (ref 8–23)
CALCIUM SERPL-MCNC: 8.3 MG/DL (ref 8.7–10.5)
CHLORIDE SERPL-SCNC: 110 MMOL/L (ref 95–110)
CO2 SERPL-SCNC: 28 MMOL/L (ref 23–29)
CREAT SERPL-MCNC: 0.7 MG/DL (ref 0.5–1.4)
DIFFERENTIAL METHOD: ABNORMAL
DIFFERENTIAL METHOD: ABNORMAL
EOSINOPHIL # BLD AUTO: 0.1 K/UL (ref 0–0.5)
EOSINOPHIL # BLD AUTO: 0.2 K/UL (ref 0–0.5)
EOSINOPHIL NFR BLD: 1.2 % (ref 0–8)
EOSINOPHIL NFR BLD: 2.2 % (ref 0–8)
ERYTHROCYTE [DISTWIDTH] IN BLOOD BY AUTOMATED COUNT: 14 % (ref 11.5–14.5)
ERYTHROCYTE [DISTWIDTH] IN BLOOD BY AUTOMATED COUNT: 14.2 % (ref 11.5–14.5)
EST. GFR  (AFRICAN AMERICAN): >60 ML/MIN/1.73 M^2
EST. GFR  (NON AFRICAN AMERICAN): >60 ML/MIN/1.73 M^2
GLUCOSE SERPL-MCNC: 80 MG/DL (ref 70–110)
HCT VFR BLD AUTO: 27.9 % (ref 37–48.5)
HCT VFR BLD AUTO: 30.5 % (ref 37–48.5)
HGB BLD-MCNC: 8.7 G/DL (ref 12–16)
HGB BLD-MCNC: 9.4 G/DL (ref 12–16)
IMM GRANULOCYTES # BLD AUTO: 0.02 K/UL (ref 0–0.04)
IMM GRANULOCYTES # BLD AUTO: 0.02 K/UL (ref 0–0.04)
IMM GRANULOCYTES NFR BLD AUTO: 0.3 % (ref 0–0.5)
IMM GRANULOCYTES NFR BLD AUTO: 0.3 % (ref 0–0.5)
LYMPHOCYTES # BLD AUTO: 1.2 K/UL (ref 1–4.8)
LYMPHOCYTES # BLD AUTO: 1.3 K/UL (ref 1–4.8)
LYMPHOCYTES NFR BLD: 17.5 % (ref 18–48)
LYMPHOCYTES NFR BLD: 17.7 % (ref 18–48)
MCH RBC QN AUTO: 27.9 PG (ref 27–31)
MCH RBC QN AUTO: 28.1 PG (ref 27–31)
MCHC RBC AUTO-ENTMCNC: 30.8 G/DL (ref 32–36)
MCHC RBC AUTO-ENTMCNC: 31.2 G/DL (ref 32–36)
MCV RBC AUTO: 90 FL (ref 82–98)
MCV RBC AUTO: 91 FL (ref 82–98)
MONOCYTES # BLD AUTO: 0.5 K/UL (ref 0.3–1)
MONOCYTES # BLD AUTO: 0.6 K/UL (ref 0.3–1)
MONOCYTES NFR BLD: 8.2 % (ref 4–15)
MONOCYTES NFR BLD: 9 % (ref 4–15)
NEUTROPHILS # BLD AUTO: 4.8 K/UL (ref 1.8–7.7)
NEUTROPHILS # BLD AUTO: 5 K/UL (ref 1.8–7.7)
NEUTROPHILS NFR BLD: 70.7 % (ref 38–73)
NEUTROPHILS NFR BLD: 72.4 % (ref 38–73)
NRBC BLD-RTO: 0 /100 WBC
NRBC BLD-RTO: 0 /100 WBC
PLATELET # BLD AUTO: 150 K/UL (ref 150–350)
PLATELET # BLD AUTO: 174 K/UL (ref 150–350)
PMV BLD AUTO: 10.7 FL (ref 9.2–12.9)
PMV BLD AUTO: 10.7 FL (ref 9.2–12.9)
POTASSIUM SERPL-SCNC: 3.6 MMOL/L (ref 3.5–5.1)
RBC # BLD AUTO: 3.1 M/UL (ref 4–5.4)
RBC # BLD AUTO: 3.37 M/UL (ref 4–5.4)
SODIUM SERPL-SCNC: 145 MMOL/L (ref 136–145)
WBC # BLD AUTO: 6.62 K/UL (ref 3.9–12.7)
WBC # BLD AUTO: 7.13 K/UL (ref 3.9–12.7)

## 2019-09-04 PROCEDURE — 63600175 PHARM REV CODE 636 W HCPCS: Mod: HCNC | Performed by: STUDENT IN AN ORGANIZED HEALTH CARE EDUCATION/TRAINING PROGRAM

## 2019-09-04 PROCEDURE — 85025 COMPLETE CBC W/AUTO DIFF WBC: CPT | Mod: 91,HCNC

## 2019-09-04 PROCEDURE — 36415 COLL VENOUS BLD VENIPUNCTURE: CPT | Mod: HCNC

## 2019-09-04 PROCEDURE — 80048 BASIC METABOLIC PNL TOTAL CA: CPT | Mod: HCNC

## 2019-09-04 PROCEDURE — 25000003 PHARM REV CODE 250: Mod: HCNC | Performed by: STUDENT IN AN ORGANIZED HEALTH CARE EDUCATION/TRAINING PROGRAM

## 2019-09-04 RX ORDER — HYDROCODONE BITARTRATE AND ACETAMINOPHEN 10; 325 MG/1; MG/1
1 TABLET ORAL EVERY 6 HOURS PRN
Qty: 15 TABLET | Refills: 0 | Status: ON HOLD | OUTPATIENT
Start: 2019-09-04 | End: 2021-06-05 | Stop reason: HOSPADM

## 2019-09-04 RX ADMIN — ACETAMINOPHEN 1000 MG: 500 TABLET ORAL at 01:09

## 2019-09-04 RX ADMIN — LEVOTHYROXINE SODIUM 125 MCG: 25 TABLET ORAL at 06:09

## 2019-09-04 RX ADMIN — OXYCODONE HYDROCHLORIDE 10 MG: 10 TABLET ORAL at 07:09

## 2019-09-04 RX ADMIN — FLUCONAZOLE IN SODIUM CHLORIDE 200 MG: 2 INJECTION, SOLUTION INTRAVENOUS at 06:09

## 2019-09-04 RX ADMIN — OXYBUTYNIN CHLORIDE 15 MG: 5 TABLET, EXTENDED RELEASE ORAL at 08:09

## 2019-09-04 RX ADMIN — ACETAMINOPHEN 1000 MG: 500 TABLET ORAL at 06:09

## 2019-09-04 RX ADMIN — ACETAMINOPHEN 1000 MG: 500 TABLET ORAL at 12:09

## 2019-09-04 RX ADMIN — OXYCODONE HYDROCHLORIDE 10 MG: 10 TABLET ORAL at 01:09

## 2019-09-04 RX ADMIN — POLYETHYLENE GLYCOL 3350 17 G: 17 POWDER, FOR SOLUTION ORAL at 08:09

## 2019-09-04 RX ADMIN — ASPIRIN 81 MG: 81 TABLET, COATED ORAL at 08:09

## 2019-09-04 RX ADMIN — FLUOXETINE 60 MG: 20 CAPSULE ORAL at 08:09

## 2019-09-04 RX ADMIN — ATORVASTATIN CALCIUM 80 MG: 20 TABLET, FILM COATED ORAL at 08:09

## 2019-09-04 RX ADMIN — AMPICILLIN SODIUM 1 G: 1 INJECTION, POWDER, FOR SOLUTION INTRAMUSCULAR; INTRAVENOUS at 06:09

## 2019-09-04 RX ADMIN — AMPICILLIN SODIUM 1 G: 1 INJECTION, POWDER, FOR SOLUTION INTRAMUSCULAR; INTRAVENOUS at 12:09

## 2019-09-04 RX ADMIN — PANTOPRAZOLE SODIUM 40 MG: 40 TABLET, DELAYED RELEASE ORAL at 08:09

## 2019-09-04 RX ADMIN — POTASSIUM CITRATE 10 MEQ: 10 TABLET, EXTENDED RELEASE ORAL at 09:09

## 2019-09-04 NOTE — SUBJECTIVE & OBJECTIVE
Interval History:   No acute events overnight  Pain moderately controlled, at baseline  No nausea  Tolerating a regular diet  Has not ambulated    Review of Systems  Objective:     Temp:  [96.2 °F (35.7 °C)-98.2 °F (36.8 °C)] 97.6 °F (36.4 °C)  Pulse:  [46-79] 56  Resp:  [10-20] 16  SpO2:  [93 %-100 %] 98 %  BP: ()/(47-59) 95/49     Body mass index is 30.9 kg/m².           Drains     Drain                 Suprapubic Catheter 12/28/18 0100 250 days         Suprapubic Catheter 09/03/19 1 day                Physical Exam   Constitutional: She is oriented to person, place, and time. No distress.   HENT:   Head: Atraumatic.   Eyes: No scleral icterus.   Neck: No JVD present.   Cardiovascular: Normal rate and regular rhythm.    Pulmonary/Chest: Effort normal.   Abdominal:   Incisions c/d/i  SPT in place draining clear yellow urine  Vaginal packing with SS drainage, removed   Neurological: She is alert and oriented to person, place, and time.   Skin: She is not diaphoretic.         Significant Labs:    BMP:  Recent Labs   Lab 09/04/19  0332      K 3.6      CO2 28   BUN 7*   CREATININE 0.7   CALCIUM 8.3*       CBC:   Recent Labs   Lab 09/04/19  0332   WBC 6.62   HGB 8.7*   HCT 27.9*          All pertinent labs results from the past 24 hours have been reviewed.    Significant Imaging:  All pertinent imaging results/findings from the past 24 hours have been reviewed.

## 2019-09-04 NOTE — DISCHARGE INSTRUCTIONS
Postoperative Instructions  No heavy lifting greater than 10 pounds or a gallon of milk  Do not strain to have a bowel movement  No strenuous exercise  You may sponge bathe or shower starting 48 hours after surgery, no baths.   Steri-strips will fall off on their own in 1-2 weeks.   No driving while you are on narcotic pain medications or if your meadows  catheter is in place.    Call the doctor if:   Temperature is greater than 101F   Persistent vomiting and inability to keep food down   Inability to pee

## 2019-09-04 NOTE — NURSING
"Pt refused to walk, states she is "not up to it" because she's in too much pain. Nurse explained the purpose and importance of walking, pt still refused. MD on call paged. Will continue to monitor.   "

## 2019-09-04 NOTE — PLAN OF CARE
Problem: Adult Inpatient Plan of Care  Goal: Plan of Care Review  Outcome: Outcome(s) achieved Date Met: 09/04/19  Patient AAOx4. Ambulated in hallway with walker and staff assist x1. Tolerated well. Appetite adequate. Will discharge home, self care. Spouse at bedside. Both verbalize understanding of discharge instructions and follow up appointments.

## 2019-09-04 NOTE — PROGRESS NOTES
[facility]  Urology  Progress Note    Patient Name: Tasha Hawley  MRN: [id number]  Admission Date: 9/3/2019  Hospital Length of Stay: 0 days  Code Status: Full Code   Attending Provider: Shireen Mayo MD   Primary Care Physician: Mesfin Hodges Ii, MD    Subjective:     HPI:  Tasha Hawley is a 64 y.o. female s/p autologous fascial sling on 9/3/19.    Interval History:   No acute events overnight  Pain moderately controlled, at baseline  No nausea  Tolerating a regular diet  Has not ambulated    Review of Systems  Objective:     Temp:  [96.2 °F (35.7 °C)-98.2 °F (36.8 °C)] 97.6 °F (36.4 °C)  Pulse:  [46-79] 56  Resp:  [10-20] 16  SpO2:  [93 %-100 %] 98 %  BP: ()/(47-59) 95/49     Body mass index is 30.9 kg/m².           Drains     Drain                 Suprapubic Catheter 12/28/18 0100 250 days         Suprapubic Catheter 09/03/19 1 day                Physical Exam   Constitutional: She is oriented to person, place, and time. No distress.   HENT:   Head: Atraumatic.   Eyes: No scleral icterus.   Neck: No JVD present.   Cardiovascular: Normal rate and regular rhythm.    Pulmonary/Chest: Effort normal.   Abdominal:   Incisions c/d/i  SPT in place draining clear yellow urine  Vaginal packing with SS drainage, removed   Neurological: She is alert and oriented to person, place, and time.   Skin: She is not diaphoretic.         Significant Labs:    BMP:  Recent Labs   Lab 09/04/19  0332      K 3.6      CO2 28   BUN 7*   CREATININE 0.7   CALCIUM 8.3*       CBC:   Recent Labs   Lab 09/04/19  0332   WBC 6.62   HGB 8.7*   HCT 27.9*          All pertinent labs results from the past 24 hours have been reviewed.    Significant Imaging:  All pertinent imaging results/findings from the past 24 hours have been reviewed.      Assessment/Plan:     Stress incontinence  Tasha Hawley is a 64 y.o. female s/p autologous fascial sling on 9/3/19.    - Regular diet  - SLIV  -  Maintain SPT  - Vaginal packing removed  - Recheck CBC at 10 for drop in H/H, vitals stable  - Walker ordered  - Must ambulate this am  - PO tylenol and oxycodone for pain control     Probable dc home this pm        VTE Risk Mitigation (From admission, onward)        Ordered     IP VTE HIGH RISK PATIENT  Once      09/03/19 1516     Place MALLORIE hose  Until discontinued      09/03/19 1516     Place sequential compression device  Until discontinued      09/03/19 1516          Michelle Yi MD  Urology  [facility]

## 2019-09-04 NOTE — DISCHARGE SUMMARY
[facility]  Urology  Discharge Summary      Patient Name: Tasha Hawley  MRN: [id number]  Admission Date: 9/3/2019  Hospital Length of Stay: 0 days  Discharge Date and Time: 9/4/2019  2:47 PM  Attending Physician: Shireen Mayo MD  Discharging Provider: Michelle Yi MD  Primary Care Physician: Mesfin Hodges Ii, MD    HPI:   Tasha Hawley is a 64 y.o. female with a SPT in place and JUNAID. She presents today for autologous fascial sling. She is without complaints today. Has been taking prescribed antibiotics. Has seen neurosurgery and is cleared for surgery.      Procedure(s) (LRB):  CYSTOSCOPY, WITH SUPRAPUBIC BLADDER SLING CREATION (N/A)  CYSTOLITHOLAPAXY (N/A)     Indwelling Lines/Drains at time of discharge:   Lines/Drains/Airways     Drain                 Suprapubic Catheter 12/28/18 0100 250 days         Suprapubic Catheter 09/03/19 1 day                Hospital Course (synopsis of major diagnoses, care, treatment, and services provided during the course of the hospital stay): Patient admitted following the above procedure which she tolerated well with no complications. On POD 1 her pain was at baseline, she ambulated and was tolerating a regular diet. Her vaginal packing was removed. There was a slight drop in her H/H which was rechecked and remained stable. She was then discharged home.     Consults:     Significant Diagnostic Studies: see chart review    Pending Diagnostic Studies:     None          Final Active Diagnoses:    Diagnosis Date Noted POA    PRINCIPAL PROBLEM:  Stress incontinence [N39.3] 09/03/2019 Yes    Chronic venous insufficiency [I87.2] 05/23/2019 Yes    Dyspnea [R06.00] 02/22/2019 Yes    Costochondritis [M94.0] 02/22/2019 Yes    Recurrent UTI (urinary tract infection) [N39.0] 06/08/2018 Yes    Esophageal dysphagia [R13.10] 05/23/2018 Yes    Scoliosis deformity of spine [M41.9] 03/31/2017 Yes     Chronic    S/P insertion of spinal cord stimulator  [Z98.890] 03/31/2017 Not Applicable     Chronic    S/P insertion of intrathecal pump [Z98.890] 03/31/2017 Not Applicable     Chronic    Paresthesia of both lower extremities [R20.2] 03/31/2017 Yes    Lymphedema of both lower extremities [I89.0] 03/02/2017 Yes     Chronic    Primary hypothyroidism [E03.9] 02/02/2017 Yes     Chronic    Frequent falls [R29.6] 02/01/2017 Not Applicable    Drug-induced constipation [K59.03] 08/16/2016 Yes     Chronic    GERD (gastroesophageal reflux disease) [K21.9] 08/16/2016 Yes     Chronic    Coronary artery disease involving native coronary artery of native heart without angina pectoris [I25.10] 08/16/2016 Yes     Chronic    Narcotic dependency, continuous [F11.20] 07/18/2014 Yes     Chronic    Thoracic aorta atherosclerosis [I70.0] 07/18/2014 Yes    Cervical myelopathy [G95.9] 05/13/2013 Yes    Chronic pain syndrome [G89.4] 05/01/2013 Yes     Chronic    Major depressive disorder, recurrent, mild [F33.0] 02/21/2013 Yes    Generalized anxiety disorder [F41.1]  Yes    Iron deficiency anemia [D50.9]  Yes      Problems Resolved During this Admission:       Discharged Condition: good    Disposition: Home or Self Care    Follow Up:  Follow-up Information     Shireen Mayo MD On 9/20/2019.    Specialty:  Urology  Why:  post op  Contact information:  [address]  [place] [address]  [phone]                 Patient Instructions:      Notify your health care provider if you experience any of the following:  temperature >100.4     Notify your health care provider if you experience any of the following:  persistent nausea and vomiting or diarrhea     Notify your health care provider if you experience any of the following:  severe uncontrolled pain     Notify your health care provider if you experience any of the following:  redness, tenderness, or signs of infection (pain, swelling, redness, odor or green/yellow discharge around incision site)     Notify your  health care provider if you experience any of the following:  persistent dizziness, light-headedness, or visual disturbances     No dressing needed   Order Comments: Steri-strips will fall off on their own in 1-2 weeks     Activity as tolerated     Shower on day dressing removed (No bath)     Medications:  Reconciled Home Medications:      Medication List      CHANGE how you take these medications    furosemide 40 MG tablet  Commonly known as:  LASIX  Take 1 tablet (40 mg total) by mouth once daily.  What changed:    · when to take this  · reasons to take this     * HYDROcodone-acetaminophen  mg per tablet  Commonly known as:  NORCO  Take 2 tablets by mouth every 4 (four) hours as needed for Pain.  What changed:  Another medication with the same name was added. Make sure you understand how and when to take each.     * HYDROcodone-acetaminophen  mg per tablet  Commonly known as:  NORCO  Take 1 tablet by mouth every 6 (six) hours as needed for Pain.  What changed:  You were already taking a medication with the same name, and this prescription was added. Make sure you understand how and when to take each.         * This list has 2 medication(s) that are the same as other medications prescribed for you. Read the directions carefully, and ask your doctor or other care provider to review them with you.            CONTINUE taking these medications    ampicillin 500 MG capsule  Commonly known as:  PRINCIPEN  Take 1 capsule (500 mg total) by mouth every 6 (six) hours. for 7 days     aspirin 81 MG EC tablet  Commonly known as:  ECOTRIN  Take 1 tablet (81 mg total) by mouth once daily.     atorvastatin 80 MG tablet  Commonly known as:  LIPITOR  TAKE 1 TABLET BY MOUTH DAILY     butalbital-acetaminophen-caffeine -40 mg -40 mg per tablet  Commonly known as:  FIORICET, ESGIC     fluconazole 200 MG Tab  Commonly known as:  DIFLUCAN  Take 1 tablet (200 mg total) by mouth once daily. For Candidal UTI for 7  days     FLUoxetine 20 MG capsule  Take 3 capsules (60 mg total) by mouth once daily.     levothyroxine 125 MCG tablet  Commonly known as:  SYNTHROID  Take 1 tablet (125 mcg total) by mouth before breakfast.     lidocaine 5 %  Commonly known as:  LIDODERM  APPLY 1 PATCH DAILY AND WEAR FOR A MAXIMUM OF 12 HOURS     oxybutynin 15 MG Tr24  Commonly known as:  DITROPAN XL  Take 1 tablet (15 mg total) by mouth once daily.     pantoprazole 40 MG tablet  Commonly known as:  PROTONIX  Take 1 tablet (40 mg total) by mouth once daily.     potassium citrate 10 mEq (1,080 mg) Tbsr  Commonly known as:  UROCIT-K  Take 1 tablet (10 mEq total) by mouth 3 (three) times daily with meals.     promethazine 25 MG tablet  Commonly known as:  PHENERGAN  Take 25 mg by mouth every 6 (six) hours as needed for Nausea.     * QUEtiapine 100 MG Tab  Commonly known as:  SEROQUEL  Take 1 tablet (100 mg total) by mouth every evening.     * QUEtiapine 100 MG Tab  Commonly known as:  SEROQUEL  TAKE 1 TABLET (100 MG TOTAL) BY MOUTH EVERY EVENING.     SENNA LAX ORAL  Take 20 mg by mouth every evening.     STOOL SOFTENER 100 MG capsule  Generic drug:  docusate sodium  Take 1 capsule (100 mg total) by mouth 3 (three) times daily as needed for constipation.     traZODone 100 MG tablet  Commonly known as:  DESYREL  Take 3 tablets (300 mg total) by mouth every evening.     VITAMIN D3 5,000 unit Tab  Generic drug:  cholecalciferol (vitamin D3)  Take 5,000 Units by mouth once daily.         * This list has 2 medication(s) that are the same as other medications prescribed for you. Read the directions carefully, and ask your doctor or other care provider to review them with you.            STOP taking these medications    lamoTRIgine 25 MG tablet  Commonly known as:  LAMICTAL            Time spent on the discharge of patient: 20 minutes    Michelle Yi MD  Urology  [facility]

## 2019-09-04 NOTE — ASSESSMENT & PLAN NOTE
Tasha JESUS Hawley is a 64 y.o. female s/p autologous fascial sling on 9/3/19.    - Regular diet  - SLIV  - Maintain SPT  - Vaginal packing removed  - Recheck CBC at 10 for drop in H/H, vitals stable  - Walker ordered  - Must ambulate this am  - PO tylenol and oxycodone for pain control     Probable dc home this pm

## 2019-09-05 ENCOUNTER — TELEPHONE (OUTPATIENT)
Dept: UROLOGY | Facility: CLINIC | Age: 64
End: 2019-09-05

## 2019-09-05 NOTE — ANESTHESIA POSTPROCEDURE EVALUATION
Anesthesia Post Evaluation    Patient: Tasha Hawley    Procedure(s) Performed: Procedure(s) (LRB):  CYSTOSCOPY, WITH SUPRAPUBIC BLADDER SLING CREATION (N/A)  CYSTOLITHOLAPAXY (N/A)    Final Anesthesia Type: general  Patient location during evaluation: PACU  Patient participation: Yes- Able to Participate  Level of consciousness: awake and alert and oriented  Post-procedure vital signs: reviewed and stable  Pain management: adequate  Airway patency: patent  PONV status at discharge: No PONV  Anesthetic complications: no      Cardiovascular status: hemodynamically stable  Respiratory status: unassisted, spontaneous ventilation and room air  Hydration status: euvolemic  Follow-up not needed.          Vitals Value Taken Time   BP 94/46 9/4/2019 11:34 AM   Temp 36.4 °C (97.6 °F) 9/4/2019 11:34 AM   Pulse 56 9/4/2019 11:34 AM   Resp 16 9/4/2019 11:34 AM   SpO2 98 % 9/4/2019 11:34 AM         Event Time     Out of Recovery 16:00:00          Pain/Low Score: Pain Rating Prior to Med Admin: 8 (9/4/2019  1:29 PM)

## 2019-09-05 NOTE — TELEPHONE ENCOUNTER
----- Message from Karoline Land MA sent at 9/5/2019  3:34 PM CDT -----  Contact: [phone]   Pt asking what was the outcome of her procedure? What did Dr Mayo find?     [phone]

## 2019-09-10 ENCOUNTER — TELEPHONE (OUTPATIENT)
Dept: PSYCHIATRY | Facility: CLINIC | Age: 64
End: 2019-09-10

## 2019-09-10 DIAGNOSIS — F41.9 ANXIETY: ICD-10-CM

## 2019-09-10 DIAGNOSIS — G47.00 INSOMNIA, UNSPECIFIED TYPE: ICD-10-CM

## 2019-09-10 RX ORDER — QUETIAPINE FUMARATE 100 MG/1
100 TABLET, FILM COATED ORAL NIGHTLY
Qty: 90 TABLET | Refills: 3 | Status: SHIPPED | OUTPATIENT
Start: 2019-09-10 | End: 2019-10-29

## 2019-09-10 RX ORDER — FLUOXETINE HYDROCHLORIDE 20 MG/1
60 CAPSULE ORAL DAILY
Qty: 270 CAPSULE | Refills: 3 | Status: SHIPPED | OUTPATIENT
Start: 2019-09-10 | End: 2019-10-29 | Stop reason: SDUPTHER

## 2019-09-10 RX ORDER — TRAZODONE HYDROCHLORIDE 100 MG/1
300 TABLET ORAL NIGHTLY
Qty: 270 TABLET | Refills: 1 | Status: SHIPPED | OUTPATIENT
Start: 2019-09-10 | End: 2019-10-29 | Stop reason: SDUPTHER

## 2019-09-10 NOTE — TELEPHONE ENCOUNTER
----- Message from Nicolette Bryson MA sent at 9/10/2019  8:51 AM CDT -----  Contact: Pegkang-[phone]  Patient would like a call back because she just had surgery and she is not doing well. She can be reached at [phone].

## 2019-09-10 NOTE — TELEPHONE ENCOUNTER
Called the patient and she states she has some bleeding but the blood is dark, no clots.  Discussed that this is normal.  The pain is getting better and in her case, it will be more intense because she already has chronic pain.  She is not leaking urine from below.  Will plan to see her for her follow up 2 weeks post op.

## 2019-09-11 ENCOUNTER — TELEPHONE (OUTPATIENT)
Dept: PODIATRY | Facility: CLINIC | Age: 64
End: 2019-09-11

## 2019-09-11 ENCOUNTER — EXTERNAL HOME HEALTH (OUTPATIENT)
Dept: HOME HEALTH SERVICES | Facility: HOSPITAL | Age: 64
End: 2019-09-11
Payer: MEDICARE

## 2019-09-11 NOTE — TELEPHONE ENCOUNTER
----- Message from Erin Nick sent at 9/11/2019 12:40 PM CDT -----  Contact: Patient  Patient called in regards to a message left on vm , stated it was in regards to scheduling a sooner appt . Requesting a call back         Patient can be reached at [phone]

## 2019-09-16 NOTE — PROGRESS NOTES
CHIEF COMPLAINT:    Mrs. Hawley is a 64 y.o. female presenting for a post op status post obstructing autologous sling 9/3/2019    PRESENTING ILLNESS:    Tasha Hawley is a 64 y.o. female who returns stating that she has been dry from below.  She is very pleased.  She had one episode in which she felt like she had a bladder spasm and leaked in her pad per urethra but when she checked, it was dry.  She has had a small amount of vaginal discharge.     She states she thinks she has a bladder infection because the urine is malodorous. But what is draining from the suprapubic tube is clear.     Allergies:  (d)-limonene flavor; Bactrim [sulfamethoxazole-trimethoprim]; Benadryl [diphenhydramine hcl]; Imitrex [sumatriptan succinate]; Topamax [topiramate]; Vancomycin; Butorphanol tartrate; Darvocet a500 [propoxyphene n-acetaminophen]; Fentanyl; White petrolatum-zinc; Zinc oxide-white petrolatum; Latex, natural rubber; and Phenytoin    Medications:  Outpatient Encounter Medications as of 9/20/2019   Medication Sig Dispense Refill    aspirin (ECOTRIN) 81 MG EC tablet Take 1 tablet (81 mg total) by mouth once daily.  0    atorvastatin (LIPITOR) 80 MG tablet TAKE 1 TABLET BY MOUTH DAILY 90 tablet 3    butalbital-acetaminophen-caffeine -40 mg (FIORICET, ESGIC) -40 mg per tablet       cholecalciferol, vitamin D3, (VITAMIN D3) 5,000 unit Tab Take 5,000 Units by mouth once daily.      docusate sodium (COLACE) 100 MG capsule Take 1 capsule (100 mg total) by mouth 3 (three) times daily as needed for constipation. 180 capsule 3    FLUoxetine 20 MG capsule Take 3 capsules (60 mg total) by mouth once daily. 270 capsule 3    furosemide (LASIX) 40 MG tablet Take 1 tablet (40 mg total) by mouth once daily. (Patient taking differently: Take 40 mg by mouth daily as needed (SWELLING). ) 90 tablet 3    HYDROcodone-acetaminophen (NORCO)  mg per tablet Take 1 tablet by mouth every 6 (six) hours as needed for  Pain. 15 tablet 0    hydrocodone-acetaminophen 10-325mg (NORCO)  mg Tab Take 2 tablets by mouth every 4 (four) hours as needed for Pain.       levothyroxine (SYNTHROID) 125 MCG tablet Take 1 tablet (125 mcg total) by mouth before breakfast. 90 tablet 3    lidocaine (LIDODERM) 5 % APPLY 1 PATCH DAILY AND WEAR FOR A MAXIMUM OF 12 HOURS  5    oxybutynin (DITROPAN XL) 15 MG TR24 Take 1 tablet (15 mg total) by mouth once daily. 30 tablet 11    pantoprazole (PROTONIX) 40 MG tablet Take 1 tablet (40 mg total) by mouth once daily. 90 tablet 3    potassium citrate (UROCIT-K) 10 mEq (1,080 mg) TbSR Take 1 tablet (10 mEq total) by mouth 3 (three) times daily with meals. 90 tablet 11    promethazine (PHENERGAN) 25 MG tablet Take 25 mg by mouth every 6 (six) hours as needed for Nausea.      QUEtiapine (SEROQUEL) 100 MG Tab TAKE 1 TABLET (100 MG TOTAL) BY MOUTH EVERY EVENING. 30 tablet 2    QUEtiapine (SEROQUEL) 100 MG Tab Take 1 tablet (100 mg total) by mouth every evening. 90 tablet 3    SENNOSIDES (SENNA LAX ORAL) Take 20 mg by mouth every evening.       traZODone (DESYREL) 100 MG tablet Take 3 tablets (300 mg total) by mouth every evening. 270 tablet 1    [DISCONTINUED] lamotrigine (LAMICTAL) 25 MG tablet Take 1 tablet (25 mg total) by mouth once daily. 30 tablet 6     No facility-administered encounter medications on file as of 9/20/2019.          PHYSICAL EXAMINATION:    The patient generally appears in good health, is appropriately interactive, and is in no apparent distress.    Skin: No lesions.    Mental: Cooperative with normal affect.    Neuro: Grossly intact.    HEENT: Normal. No evidence of lymphadenopathy.    Chest:  normal inspiratory effort.    Abdomen:  Soft, non-tender. No masses or organomegaly. Bladder is not palpable. No evidence of flank discomfort. No evidence of inguinal hernia.    Extremities: No clubbing, cyanosis, or edema      LABS:    Lab Results   Component Value Date    BUN 7 (L)  09/04/2019    CREATININE 0.7 09/04/2019     IMPRESSION:    Encounter Diagnoses   Name Primary?    Post-operative state Yes       PLAN:    1. Recommended vitamin C 500 mg bid  2.  Discussed, she may see sutures in the pull up closer to the 6 week post op  3.  Follow up in 4 weeks.

## 2019-09-18 ENCOUNTER — OFFICE VISIT (OUTPATIENT)
Dept: PODIATRY | Facility: CLINIC | Age: 64
End: 2019-09-18
Payer: MEDICARE

## 2019-09-18 VITALS
DIASTOLIC BLOOD PRESSURE: 58 MMHG | HEART RATE: 60 BPM | HEIGHT: 64 IN | WEIGHT: 180 LBS | BODY MASS INDEX: 30.73 KG/M2 | SYSTOLIC BLOOD PRESSURE: 100 MMHG

## 2019-09-18 DIAGNOSIS — M79.671 FOOT PAIN, BILATERAL: ICD-10-CM

## 2019-09-18 DIAGNOSIS — M79.672 FOOT PAIN, BILATERAL: ICD-10-CM

## 2019-09-18 DIAGNOSIS — M19.079 ARTHRITIS OF FOOT: Primary | ICD-10-CM

## 2019-09-18 PROCEDURE — 99214 PR OFFICE/OUTPT VISIT, EST, LEVL IV, 30-39 MIN: ICD-10-PCS | Mod: HCNC,S$GLB,, | Performed by: PODIATRIST

## 2019-09-18 PROCEDURE — 3008F BODY MASS INDEX DOCD: CPT | Mod: HCNC,CPTII,S$GLB, | Performed by: PODIATRIST

## 2019-09-18 PROCEDURE — 99214 OFFICE O/P EST MOD 30 MIN: CPT | Mod: HCNC,S$GLB,, | Performed by: PODIATRIST

## 2019-09-18 PROCEDURE — 3008F PR BODY MASS INDEX (BMI) DOCUMENTED: ICD-10-PCS | Mod: HCNC,CPTII,S$GLB, | Performed by: PODIATRIST

## 2019-09-18 PROCEDURE — 99999 PR PBB SHADOW E&M-EST. PATIENT-LVL III: ICD-10-PCS | Mod: PBBFAC,HCNC,, | Performed by: PODIATRIST

## 2019-09-18 PROCEDURE — 99999 PR PBB SHADOW E&M-EST. PATIENT-LVL III: CPT | Mod: PBBFAC,HCNC,, | Performed by: PODIATRIST

## 2019-09-18 NOTE — PROGRESS NOTES
Subjective:      Patient ID: Tasha Hawley is a 64 y.o. female.    Chief Complaint: No chief complaint on file.    Deep aching throbbing burning pain arches both feet.  Gradual onset, worsening over past several years following back surgeries, aggravated by increased weight bearing, shoe gear, pressure.  No previous medical treatment.  OTC pain med not helping. Denies trauma, surgery bothfeet.    ROS        Objective:      Physical Exam          Assessment:       Encounter Diagnoses   Name Primary?    Arthritis of foot Yes    Foot pain, bilateral          Plan:       Diagnoses and all orders for this visit:    Arthritis of foot  -     X-Ray Foot Complete Bilateral; Future  -     Ambulatory consult to Physical Therapy  -     ORTHOTIC DEVICE (DME)    Foot pain, bilateral  -     X-Ray Foot Complete Bilateral; Future  -     Ambulatory consult to Physical Therapy  -     ORTHOTIC DEVICE (DME)      I counseled the patient on her conditions, their implications and medical management.        Patient will stretch the tendo achilles complex three times daily as demonstrated in the office.  Literature was dispensed illustrating proper stretching technique.    Patient will obtain over the counter arch supports and wear them in shoes whenever possible.  Athletic shoes intended for walking or running are usually best.    The patient was advised that NSAID-type medications have two very important potential side effects: gastrointestinal irritation including hemorrhage and renal injuries. She was asked to take the medication with food and to stop if she experiences any GI upset. I asked her to call for vomiting, abdominal pain or black/bloody stools. The patient expresses understanding of these issues and questions were answered.    Discussed conservative treatment with shoes of adequate dimensions, material, and style to alleviate symptoms and delay or prevent surgical intervention.    Rx lidocaine gel, xrays, custom  orthotics.    Continue pain mangement adding mechanical treatments to feet to help reduce pain, and improve stability.          Follow up in about 1 month (around 10/18/2019).

## 2019-09-20 ENCOUNTER — OFFICE VISIT (OUTPATIENT)
Dept: UROLOGY | Facility: CLINIC | Age: 64
End: 2019-09-20
Payer: MEDICARE

## 2019-09-20 VITALS
HEART RATE: 45 BPM | DIASTOLIC BLOOD PRESSURE: 49 MMHG | BODY MASS INDEX: 28.17 KG/M2 | WEIGHT: 165 LBS | SYSTOLIC BLOOD PRESSURE: 74 MMHG | HEIGHT: 64 IN

## 2019-09-20 DIAGNOSIS — Z98.890 POST-OPERATIVE STATE: Primary | ICD-10-CM

## 2019-09-20 PROBLEM — N39.3 STRESS INCONTINENCE: Status: RESOLVED | Noted: 2019-09-03 | Resolved: 2019-09-20

## 2019-09-20 PROCEDURE — 99024 POSTOP FOLLOW-UP VISIT: CPT | Mod: HCNC,S$GLB,, | Performed by: UROLOGY

## 2019-09-20 PROCEDURE — 99499 UNLISTED E&M SERVICE: CPT | Mod: HCNC,S$GLB,, | Performed by: UROLOGY

## 2019-09-20 PROCEDURE — 99999 PR PBB SHADOW E&M-EST. PATIENT-LVL IV: ICD-10-PCS | Mod: PBBFAC,HCNC,, | Performed by: UROLOGY

## 2019-09-20 PROCEDURE — 99499 RISK ADDL DX/OHS AUDIT: ICD-10-PCS | Mod: HCNC,S$GLB,, | Performed by: UROLOGY

## 2019-09-20 PROCEDURE — 99024 PR POST-OP FOLLOW-UP VISIT: ICD-10-PCS | Mod: HCNC,S$GLB,, | Performed by: UROLOGY

## 2019-09-20 PROCEDURE — 99999 PR PBB SHADOW E&M-EST. PATIENT-LVL IV: CPT | Mod: PBBFAC,HCNC,, | Performed by: UROLOGY

## 2019-09-25 ENCOUNTER — TELEPHONE (OUTPATIENT)
Dept: UROLOGY | Facility: CLINIC | Age: 64
End: 2019-09-25

## 2019-09-25 NOTE — TELEPHONE ENCOUNTER
Per jm Gutierrez for change and collection of urine specimen from new catheter for culture.    Called Liz, gave ntotice to resume sptube changes every 3 weeks w/ PRN visits. Gave verbal order for PRN visit this week with collection of urine for cx. Expressed understanding.

## 2019-09-25 NOTE — TELEPHONE ENCOUNTER
----- Message from Sis Calixto sent at 9/25/2019  1:31 PM CDT -----  Contact: pt: [phone]  Pt state she has some burning and pressure would like to speak with someone       pt: [phone]

## 2019-09-25 NOTE — TELEPHONE ENCOUNTER
----- Message from Rita Persaud sent at 9/24/2019  2:36 PM CDT -----  Contact: Liz mike/ [facility]  Type:  Needs Medical Advice    Who Called: Liz koch want know how often you want pt cath to be changed and when do you want her to change it again.     Would the patient rather a call back or a response via [facility]? Call  Best Call Back Number: [phone]  Additional Information:

## 2019-09-26 ENCOUNTER — TELEPHONE (OUTPATIENT)
Dept: HOME HEALTH SERVICES | Facility: HOSPITAL | Age: 64
End: 2019-09-26

## 2019-09-30 ENCOUNTER — TELEPHONE (OUTPATIENT)
Dept: UROLOGY | Facility: CLINIC | Age: 64
End: 2019-09-30

## 2019-09-30 DIAGNOSIS — N30.90 BLADDER INFECTION: Primary | ICD-10-CM

## 2019-09-30 RX ORDER — AMOXICILLIN AND CLAVULANATE POTASSIUM 500; 125 MG/1; MG/1
1 TABLET, FILM COATED ORAL 3 TIMES DAILY
Qty: 21 TABLET | Refills: 0 | Status: SHIPPED | OUTPATIENT
Start: 2019-09-30 | End: 2019-10-08

## 2019-10-01 NOTE — TELEPHONE ENCOUNTER
Received a culture report Proteus mirabilis Sensitive to Augmentin.  Therefore, this was sent to patient's preferred pharmacy

## 2019-10-01 NOTE — TELEPHONE ENCOUNTER
----- Message from Amanda Null sent at 9/30/2019  4:55 PM CDT -----  Contact: pt  Pt calling    Has kidney and bladder infection       Home health  = called pt    Test showed  Infection      Pt leaking  - comes pouring out    Hurting or burning     If after 5 send to [facility] in [place]    Ph   [phone]    Thanks

## 2019-10-08 ENCOUNTER — TELEPHONE (OUTPATIENT)
Dept: UROLOGY | Facility: CLINIC | Age: 64
End: 2019-10-08

## 2019-10-08 NOTE — TELEPHONE ENCOUNTER
----- Message from Karoline Land MA sent at 10/8/2019 11:36 AM CDT -----  Contact: [phone] (P)     states that she still has symptoms of a UTI, she has been on antibiotics for a week    Please call  [phone] (A)

## 2019-10-15 ENCOUNTER — PATIENT OUTREACH (OUTPATIENT)
Dept: ADMINISTRATIVE | Facility: OTHER | Age: 64
End: 2019-10-15

## 2019-10-17 ENCOUNTER — TELEPHONE (OUTPATIENT)
Dept: UROLOGY | Facility: CLINIC | Age: 64
End: 2019-10-17

## 2019-10-17 DIAGNOSIS — N30.90 BLADDER INFECTION: Primary | ICD-10-CM

## 2019-10-17 NOTE — TELEPHONE ENCOUNTER
----- Message from Hiral Tobin sent at 10/17/2019  9:10 AM CDT -----  Contact: PT  Pt would like a nurse to call her now in regards to pressure she is having below   Pt hang up before I could verify her telephone number  Thanks

## 2019-10-18 RX ORDER — AMPICILLIN 500 MG/1
500 CAPSULE ORAL EVERY 6 HOURS
Qty: 28 CAPSULE | Refills: 0 | Status: ON HOLD | OUTPATIENT
Start: 2019-10-18 | End: 2019-10-29 | Stop reason: SDUPTHER

## 2019-10-18 NOTE — TELEPHONE ENCOUNTER
Ampicillin sent. Pt notified    She asked about getting an ileal conduit (described her neighbor who knows someone who wears a bag with a spigot on it)  Discussed that this is a big surgery that we generally do when someone has had a cystectomy for cancer.  Her heart is such that this would be ill advised.

## 2019-10-21 ENCOUNTER — TELEPHONE (OUTPATIENT)
Dept: UROLOGY | Facility: CLINIC | Age: 64
End: 2019-10-21

## 2019-10-21 DIAGNOSIS — N31.9 NEUROGENIC BLADDER: Primary | ICD-10-CM

## 2019-10-22 ENCOUNTER — TELEPHONE (OUTPATIENT)
Dept: INTERNAL MEDICINE | Facility: CLINIC | Age: 64
End: 2019-10-22

## 2019-10-22 NOTE — TELEPHONE ENCOUNTER
----- Message from Isabel Vang RN sent at 10/22/2019  4:11 PM CDT -----  Pt. Is scheduled for cysto with botox injection by Dr. Mayo 10/29.  Procedure is about 45-50 minutes. Do you feel she can proceed without any issues?          SANTHOSH Vang RN BC  Pre-op anesthesia

## 2019-10-23 ENCOUNTER — OFFICE VISIT (OUTPATIENT)
Dept: UROLOGY | Facility: CLINIC | Age: 64
End: 2019-10-23
Payer: MEDICARE

## 2019-10-23 ENCOUNTER — TELEPHONE (OUTPATIENT)
Dept: UROLOGY | Facility: CLINIC | Age: 64
End: 2019-10-23

## 2019-10-23 ENCOUNTER — TELEPHONE (OUTPATIENT)
Dept: CARDIOLOGY | Facility: CLINIC | Age: 64
End: 2019-10-23

## 2019-10-23 VITALS
DIASTOLIC BLOOD PRESSURE: 42 MMHG | HEART RATE: 54 BPM | WEIGHT: 153 LBS | HEIGHT: 64 IN | SYSTOLIC BLOOD PRESSURE: 92 MMHG | BODY MASS INDEX: 26.12 KG/M2

## 2019-10-23 DIAGNOSIS — N31.9 NEUROGENIC BLADDER: ICD-10-CM

## 2019-10-23 DIAGNOSIS — N39.41 URGE INCONTINENCE: ICD-10-CM

## 2019-10-23 DIAGNOSIS — Z98.890 POST-OPERATIVE STATE: Primary | ICD-10-CM

## 2019-10-23 PROCEDURE — 99024 PR POST-OP FOLLOW-UP VISIT: ICD-10-PCS | Mod: HCNC,S$GLB,, | Performed by: UROLOGY

## 2019-10-23 PROCEDURE — 99024 POSTOP FOLLOW-UP VISIT: CPT | Mod: HCNC,S$GLB,, | Performed by: UROLOGY

## 2019-10-23 PROCEDURE — 99999 PR PBB SHADOW E&M-EST. PATIENT-LVL IV: ICD-10-PCS | Mod: PBBFAC,HCNC,, | Performed by: UROLOGY

## 2019-10-23 PROCEDURE — 99999 PR PBB SHADOW E&M-EST. PATIENT-LVL IV: CPT | Mod: PBBFAC,HCNC,, | Performed by: UROLOGY

## 2019-10-23 NOTE — H&P (VIEW-ONLY)
CHIEF COMPLAINT:    Mrs. Hawley is a 64 y.o. female presenting for a post op status post obstructing autologous sling 9/3/2019.    PRESENTING ILLNESS:    Tasha Hawley is a 64 y.o. female who states she is much better.  Right after surgery, she did not need to wear a pad.  She got a UTI and then started having mixed incontinence symptoms.  The most leaking she has is associated with a bladder spasm.  She does not tend to leak around the catheter.  She also has some stress incontinence when transferring from the wheel chair to her bed.  But it is less that it was previously.  She last had an injection of Botox in March 2018.      The patient had the catheter changed on the 17th, and a urine was sent.  She was placed on ampicillin on the 18th.  States that this has not improved her urinary incontinence.  However, the malodorous urine and cloudiness has improved.  She feels better.     REVIEW OF SYSTEMS:    Review of Systems   Constitutional: Negative.    HENT: Negative.    Eyes: Negative.    Respiratory: Negative.    Cardiovascular: Negative.    Gastrointestinal: Negative.    Genitourinary: Positive for urgency.   Musculoskeletal: Positive for back pain.   Skin: Negative.    Neurological: Negative.    Endo/Heme/Allergies: Negative.    Psychiatric/Behavioral: Positive for depression.       PATIENT HISTORY:    Past Medical History:   Diagnosis Date    Anticoagulant long-term use     Anxiety     Arthritis     Bilateral lower extremity edema     severe chronic    Carotid artery occlusion     Cataract     Coronary artery disease     subtotalled LAD with collateral    Depression     Fever blister     Hypothyroid     Iron deficiency anemia     Lumbar radiculopathy     with chronic pain    Ocular migraine     Sleep apnea     cpap       Past Surgical History:   Procedure Laterality Date    ADENOIDECTOMY      BACK SURGERY      x 12    CARDIAC CATHETERIZATION  2016    subtotalled LAD with right to left  collaterals    CATARACT EXTRACTION W/  INTRAOCULAR LENS IMPLANT Left     Dr Coleman     CYSTOSCOPIC LITHOLAPAXY N/A 6/27/2019    Procedure: CYSTOLITHOLAPAXY;  Surgeon: Shireen Mayo MD;  Location: [facility] OR 2ND FLR;  Service: Urology;  Laterality: N/A;    CYSTOSCOPIC LITHOLAPAXY N/A 9/3/2019    Procedure: CYSTOLITHOLAPAXY;  Surgeon: Shireen Mayo MD;  Location: [facility] OR 2ND FLR;  Service: Urology;  Laterality: N/A;    ESOPHAGOGASTRODUODENOSCOPY N/A 5/23/2018    Procedure: ESOPHAGOGASTRODUODENOSCOPY (EGD);  Surgeon: Prince Vance MD;  Location: [facility] (4TH FLR);  Service: Endoscopy;  Laterality: N/A;  r/s 'd per Dr. Vance due to family emergency- ER    HYSTERECTOMY  1975    endometriosis    pain pump placement      SQ Dilaudid Pump managed by Dr. Hillman, Pain Management    SPINAL CORD STIMULATOR REMOVAL      before [place]    SPINE SURGERY  5-13-13    CERVICAL FUSION    TONSILLECTOMY         Family History   Problem Relation Age of Onset    Cancer Mother 55        breast    Cancer Father         esophagus,had laryngectomy    Esophageal cancer Father     Parkinsonism Maternal Grandmother     Tremor Maternal Grandmother     Heart disease Maternal Uncle      Socioeconomic History    Marital status:    Tobacco Use    Smoking status: Never Smoker    Smokeless tobacco: Never Used   Substance and Sexual Activity    Alcohol use: Never     Frequency: Never    Drug use: No    Sexual activity: Never       Allergies:  (d)-limonene flavor; Bactrim [sulfamethoxazole-trimethoprim]; Benadryl [diphenhydramine hcl]; Imitrex [sumatriptan succinate]; Topamax [topiramate]; Vancomycin; Butorphanol tartrate; Darvocet a500 [propoxyphene n-acetaminophen]; Fentanyl; White petrolatum-zinc; Zinc oxide-white petrolatum; Latex, natural rubber; and Phenytoin    Medications:  Outpatient Encounter Medications as of 10/23/2019   Medication Sig Dispense Refill    ampicillin (PRINCIPEN) 500 MG capsule Take 1  capsule (500 mg total) by mouth every 6 (six) hours. 28 capsule 0    aspirin (ECOTRIN) 81 MG EC tablet Take 1 tablet (81 mg total) by mouth once daily.  0    atorvastatin (LIPITOR) 80 MG tablet TAKE 1 TABLET BY MOUTH DAILY 90 tablet 3    butalbital-acetaminophen-caffeine -40 mg (FIORICET, ESGIC) -40 mg per tablet       cholecalciferol, vitamin D3, (VITAMIN D3) 5,000 unit Tab Take 5,000 Units by mouth once daily.      docusate sodium (COLACE) 100 MG capsule Take 1 capsule (100 mg total) by mouth 3 (three) times daily as needed for constipation. 180 capsule 3    FLUoxetine 20 MG capsule Take 3 capsules (60 mg total) by mouth once daily. 270 capsule 3    furosemide (LASIX) 40 MG tablet Take 1 tablet (40 mg total) by mouth once daily. (Patient taking differently: Take 40 mg by mouth daily as needed (SWELLING). ) 90 tablet 3    HYDROcodone-acetaminophen (NORCO)  mg per tablet Take 1 tablet by mouth every 6 (six) hours as needed for Pain. 15 tablet 0    hydrocodone-acetaminophen 10-325mg (NORCO)  mg Tab Take 2 tablets by mouth every 4 (four) hours as needed for Pain.       levothyroxine (SYNTHROID) 125 MCG tablet Take 1 tablet (125 mcg total) by mouth before breakfast. 90 tablet 3    lidocaine (LIDODERM) 5 % APPLY 1 PATCH DAILY AND WEAR FOR A MAXIMUM OF 12 HOURS  5    oxybutynin (DITROPAN XL) 15 MG TR24 Take 1 tablet (15 mg total) by mouth once daily. 30 tablet 11    pantoprazole (PROTONIX) 40 MG tablet Take 1 tablet (40 mg total) by mouth once daily. 90 tablet 3    potassium citrate (UROCIT-K) 10 mEq (1,080 mg) TbSR Take 1 tablet (10 mEq total) by mouth 3 (three) times daily with meals. 90 tablet 11    promethazine (PHENERGAN) 25 MG tablet Take 25 mg by mouth every 6 (six) hours as needed for Nausea.      QUEtiapine (SEROQUEL) 100 MG Tab TAKE 1 TABLET (100 MG TOTAL) BY MOUTH EVERY EVENING. 30 tablet 2    QUEtiapine (SEROQUEL) 100 MG Tab Take 1 tablet (100 mg total) by mouth every  evening. 90 tablet 3    SENNOSIDES (SENNA LAX ORAL) Take 20 mg by mouth every evening.       traZODone (DESYREL) 100 MG tablet Take 3 tablets (300 mg total) by mouth every evening. 270 tablet 1    [DISCONTINUED] lamotrigine (LAMICTAL) 25 MG tablet Take 1 tablet (25 mg total) by mouth once daily. 30 tablet 6     No facility-administered encounter medications on file as of 10/23/2019.          PHYSICAL EXAMINATION:    The patient generally appears in good health, is appropriately interactive, and is in no apparent distress.    Skin: No lesions.    Mental: Cooperative with normal affect.    Neuro: Grossly intact.    HEENT: Normal. No evidence of lymphadenopathy.    Chest:  normal inspiratory effort.    Abdomen:  Soft, non-tender. No masses or organomegaly. Bladder is not palpable. No evidence of flank discomfort. No evidence of inguinal hernia.  The two incisions have healed well.  The lower one has some scar tissue.      Extremities: No clubbing, cyanosis, or edema    Normal external female genitalia  Urethral meatus is normal  Urethra and bladder are nontender to bimanual exam.  Well healed, no tenderness.   Well supported anteriorly and posteriorly   Uterus and cervix are surgically absent  No adnexal masses    LABS:    Lab Results   Component Value Date    BUN 7 (L) 09/04/2019    CREATININE 0.7 09/04/2019     IMPRESSION:    Encounter Diagnoses   Name Primary?    Post-operative state Yes    Urge incontinence     Neurogenic bladder        PLAN:    1. Consented for Botox injection of the bladder  2.  Reassured her about the scar tissue.    3.  Plan to have home health change the suprapubic tube and get a culture 10 days prior to the Botox injection.

## 2019-10-23 NOTE — PRE ADMISSION SCREENING
Anesthesia Assessment: Preoperative EQUATION    Planned Procedure: Procedure(s) (LRB):  CYSTOSCOPY,WITH BOTULINUM TOXIN INJECTION 200 units (N/A)  Requested Anesthesia Type:Monitor Anesthesia Care  Surgeon: Shireen Mayo MD  Service: Urology  Known or anticipated Date of Surgery:10/29/2019    Surgeon notes: reviewed    Electronic QUestionnaire Assessment completed via nurse interview with patient.        No Aq        Triage considerations:       Previous anesthesia records:GETA   Airway Present Prior to Hospital Arrival?: No Placement Date: 09/03/19 Placement Time: 1155 Method of Intubation: Direct laryngoscopy Inserted by: LULI WELLER CRNA  Airway Device: Endotracheal Tube Mask Ventilation: Easy Intubated: Postinduction Blade: Katlyn #3 Airway Device Size: 7.5 Style: Cuffed Cuff Inflation: Minimal occlusive pressure Inflation Amount (mL): 7 Placement Verified By: Auscultation;Capnometry;ETT Condensation Grade: Grade II Complicating Factors: None Findings Post-Intubation: Positive EtCO2;Bilateral breath sounds;Atraumatic/Condition of teeth unchanged Depth of Insertion (cm): 22 Secured at: Lips Complications: None Breath Sounds: Equal Bilateral Insertion attempts (enter comment if more than 2 attempts): 1 Removal Date: 09/03/19 Removal Time: 1456   Airway Placement Date: 09/03/19 Placement Time: 1155 Method of Intubation: Direct laryngoscopy Inserted by: LULI WELLER CRNA  Airway Device: Endotracheal Tube Airway Device Size: 7.5 Style: Cuffed Depth of Insertion (cm): 22 Inflation Amount (mL): 7 Placement Verified By: Auscultation;Capnometry;ETT Condensation Breath Sounds: Equal Bilateral Insertion attempts (enter comment if more than 2 attempts): 1 Removal Date: 09/03/19 Removal Time: 1456         Last PCP note: within 3 months , within [facility] Dr[facility]  Subspecialty notes: Cardiology: General, Gastroenterology, Hematology/Oncology, Neurology, Psychiatry, urology/     Other important  co-morbidities:   Long-term asa use  GONZALO  Arthritis  Carotid artery occlusion  Depression  Hypothyroid  Hx iron deficiency anemia  Lumbar radiculopathy  Cervical mylopathy  DDD  Sleep apnea -- not using c-pap  Current UTI--  On ampicillin  Chronic pain syndrome-----has implanted pain pump dilaudid   Neurogenic bladder  GERD  Scoliosis of spine  Hx lymphedema of alyson lower extremities  Recent fall- 3 weeks ago    Tests already available:  Available tests,  within 3 months , within [facility] .     EKG 12-lead   Order: [id number]   Status:  Final result   Visible to patient:  Yes (Patient Portal) Next appt:  Today at 02:20 PM in Urology (Shireen Mayo MD) Dx:  Chest pain      Narrative   Performed by: GEMUSE   Test Reason : R07.9,    Vent. Rate : 090 BPM     Atrial Rate : 090 BPM     P-R Int : 146 ms          QRS Dur : 098 ms      QT Int : 384 ms       P-R-T Axes : 045 006 058 degrees     QTc Int : 469 ms    Normal sinus rhythm  Nonspecific ST abnormality  Normal ECG  When compared with ECG of 22-FEB-2019 12:02,  No significant change was found  Confirmed by Walter Goldstein MD ([id number]) on 2/26/2019 8:52:51 AM    Referred By: System System           Confirmed By:Walter Goldstein MD      Specimen Collected: 02/23/19 22:27 Last Resulted: 02/26/19 08:52                       Instructions given. (See in Nurse's note)    Optimization:    Medical Opinion Indicated       Sub-specialist consult indicated:  Cardiology for asa instructions / PCP          Plan:    Testing:  none   Pre-anesthesia  visit       Visit focus: none     Consultation:messaged cardiology for asa instructions// notified dr. Hodges of procedure      Navigation:             Straight Line to surgery.               No tests, anesthesia preop clinic visit, or consult required.                  Plans per surgeon and Follow-up per Surgeon      Addendum:  Fay Powers MD   Physician   Cardiology   Telephone Encounter   Signed   Encounter Date:   10/23/2019                Aspirin can be held for one week prior to the surgery and restarted afterwards asap.      Electronically signed by Fay Powers MD at 10/23/2019  8:55 AM

## 2019-10-23 NOTE — PRE-PROCEDURE INSTRUCTIONS
Mesfin Hodges II, MD  You; Carrie Myers MA 2 hours ago (8:51 AM)      Yes, she can proceed to this low risk procedure without any further testing.  Dr. Hodges    Routing comment      Fay Powers MD 2 hours ago (8:55 AM)         Aspirin can be held for one week prior to the surgery and restarted afterwards asap.         Documentation       Fay Powers MD

## 2019-10-23 NOTE — PROGRESS NOTES
CHIEF COMPLAINT:    Mrs. Hawley is a 64 y.o. female presenting for a post op status post obstructing autologous sling 9/3/2019.    PRESENTING ILLNESS:    Tasha Hawley is a 64 y.o. female who states she is much better.  Right after surgery, she did not need to wear a pad.  She got a UTI and then started having mixed incontinence symptoms.  The most leaking she has is associated with a bladder spasm.  She does not tend to leak around the catheter.  She also has some stress incontinence when transferring from the wheel chair to her bed.  But it is less that it was previously.  She last had an injection of Botox in March 2018.      The patient had the catheter changed on the 17th, and a urine was sent.  She was placed on ampicillin on the 18th.  States that this has not improved her urinary incontinence.  However, the malodorous urine and cloudiness has improved.  She feels better.     REVIEW OF SYSTEMS:    Review of Systems   Constitutional: Negative.    HENT: Negative.    Eyes: Negative.    Respiratory: Negative.    Cardiovascular: Negative.    Gastrointestinal: Negative.    Genitourinary: Positive for urgency.   Musculoskeletal: Positive for back pain.   Skin: Negative.    Neurological: Negative.    Endo/Heme/Allergies: Negative.    Psychiatric/Behavioral: Positive for depression.       PATIENT HISTORY:    Past Medical History:   Diagnosis Date    Anticoagulant long-term use     Anxiety     Arthritis     Bilateral lower extremity edema     severe chronic    Carotid artery occlusion     Cataract     Coronary artery disease     subtotalled LAD with collateral    Depression     Fever blister     Hypothyroid     Iron deficiency anemia     Lumbar radiculopathy     with chronic pain    Ocular migraine     Sleep apnea     cpap       Past Surgical History:   Procedure Laterality Date    ADENOIDECTOMY      BACK SURGERY      x 12    CARDIAC CATHETERIZATION  2016    subtotalled LAD with right to left  collaterals    CATARACT EXTRACTION W/  INTRAOCULAR LENS IMPLANT Left     Dr Coleman     CYSTOSCOPIC LITHOLAPAXY N/A 6/27/2019    Procedure: CYSTOLITHOLAPAXY;  Surgeon: Shireen Mayo MD;  Location: [facility] OR 2ND FLR;  Service: Urology;  Laterality: N/A;    CYSTOSCOPIC LITHOLAPAXY N/A 9/3/2019    Procedure: CYSTOLITHOLAPAXY;  Surgeon: Shireen Mayo MD;  Location: [facility] OR 2ND FLR;  Service: Urology;  Laterality: N/A;    ESOPHAGOGASTRODUODENOSCOPY N/A 5/23/2018    Procedure: ESOPHAGOGASTRODUODENOSCOPY (EGD);  Surgeon: Prince Vance MD;  Location: [facility] (4TH FLR);  Service: Endoscopy;  Laterality: N/A;  r/s 'd per Dr. Vance due to family emergency- ER    HYSTERECTOMY  1975    endometriosis    pain pump placement      SQ Dilaudid Pump managed by Dr. Hillman, Pain Management    SPINAL CORD STIMULATOR REMOVAL      before [facility]    SPINE SURGERY  5-13-13    CERVICAL FUSION    TONSILLECTOMY         Family History   Problem Relation Age of Onset    Cancer Mother 55        breast    Cancer Father         esophagus,had laryngectomy    Esophageal cancer Father     Parkinsonism Maternal Grandmother     Tremor Maternal Grandmother     Heart disease Maternal Uncle      Socioeconomic History    Marital status:    Tobacco Use    Smoking status: Never Smoker    Smokeless tobacco: Never Used   Substance and Sexual Activity    Alcohol use: Never     Frequency: Never    Drug use: No    Sexual activity: Never       Allergies:  (d)-limonene flavor; Bactrim [sulfamethoxazole-trimethoprim]; Benadryl [diphenhydramine hcl]; Imitrex [sumatriptan succinate]; Topamax [topiramate]; Vancomycin; Butorphanol tartrate; Darvocet a500 [propoxyphene n-acetaminophen]; Fentanyl; White petrolatum-zinc; Zinc oxide-white petrolatum; Latex, natural rubber; and Phenytoin    Medications:  Outpatient Encounter Medications as of 10/23/2019   Medication Sig Dispense Refill    ampicillin (PRINCIPEN) 500 MG capsule Take 1  capsule (500 mg total) by mouth every 6 (six) hours. 28 capsule 0    aspirin (ECOTRIN) 81 MG EC tablet Take 1 tablet (81 mg total) by mouth once daily.  0    atorvastatin (LIPITOR) 80 MG tablet TAKE 1 TABLET BY MOUTH DAILY 90 tablet 3    butalbital-acetaminophen-caffeine -40 mg (FIORICET, ESGIC) -40 mg per tablet       cholecalciferol, vitamin D3, (VITAMIN D3) 5,000 unit Tab Take 5,000 Units by mouth once daily.      docusate sodium (COLACE) 100 MG capsule Take 1 capsule (100 mg total) by mouth 3 (three) times daily as needed for constipation. 180 capsule 3    FLUoxetine 20 MG capsule Take 3 capsules (60 mg total) by mouth once daily. 270 capsule 3    furosemide (LASIX) 40 MG tablet Take 1 tablet (40 mg total) by mouth once daily. (Patient taking differently: Take 40 mg by mouth daily as needed (SWELLING). ) 90 tablet 3    HYDROcodone-acetaminophen (NORCO)  mg per tablet Take 1 tablet by mouth every 6 (six) hours as needed for Pain. 15 tablet 0    hydrocodone-acetaminophen 10-325mg (NORCO)  mg Tab Take 2 tablets by mouth every 4 (four) hours as needed for Pain.       levothyroxine (SYNTHROID) 125 MCG tablet Take 1 tablet (125 mcg total) by mouth before breakfast. 90 tablet 3    lidocaine (LIDODERM) 5 % APPLY 1 PATCH DAILY AND WEAR FOR A MAXIMUM OF 12 HOURS  5    oxybutynin (DITROPAN XL) 15 MG TR24 Take 1 tablet (15 mg total) by mouth once daily. 30 tablet 11    pantoprazole (PROTONIX) 40 MG tablet Take 1 tablet (40 mg total) by mouth once daily. 90 tablet 3    potassium citrate (UROCIT-K) 10 mEq (1,080 mg) TbSR Take 1 tablet (10 mEq total) by mouth 3 (three) times daily with meals. 90 tablet 11    promethazine (PHENERGAN) 25 MG tablet Take 25 mg by mouth every 6 (six) hours as needed for Nausea.      QUEtiapine (SEROQUEL) 100 MG Tab TAKE 1 TABLET (100 MG TOTAL) BY MOUTH EVERY EVENING. 30 tablet 2    QUEtiapine (SEROQUEL) 100 MG Tab Take 1 tablet (100 mg total) by mouth every  evening. 90 tablet 3    SENNOSIDES (SENNA LAX ORAL) Take 20 mg by mouth every evening.       traZODone (DESYREL) 100 MG tablet Take 3 tablets (300 mg total) by mouth every evening. 270 tablet 1    [DISCONTINUED] lamotrigine (LAMICTAL) 25 MG tablet Take 1 tablet (25 mg total) by mouth once daily. 30 tablet 6     No facility-administered encounter medications on file as of 10/23/2019.          PHYSICAL EXAMINATION:    The patient generally appears in good health, is appropriately interactive, and is in no apparent distress.    Skin: No lesions.    Mental: Cooperative with normal affect.    Neuro: Grossly intact.    HEENT: Normal. No evidence of lymphadenopathy.    Chest:  normal inspiratory effort.    Abdomen:  Soft, non-tender. No masses or organomegaly. Bladder is not palpable. No evidence of flank discomfort. No evidence of inguinal hernia.  The two incisions have healed well.  The lower one has some scar tissue.      Extremities: No clubbing, cyanosis, or edema    Normal external female genitalia  Urethral meatus is normal  Urethra and bladder are nontender to bimanual exam.  Well healed, no tenderness.   Well supported anteriorly and posteriorly   Uterus and cervix are surgically absent  No adnexal masses    LABS:    Lab Results   Component Value Date    BUN 7 (L) 09/04/2019    CREATININE 0.7 09/04/2019     IMPRESSION:    Encounter Diagnoses   Name Primary?    Post-operative state Yes    Urge incontinence     Neurogenic bladder        PLAN:    1. Consented for Botox injection of the bladder  2.  Reassured her about the scar tissue.    3.  Plan to have home health change the suprapubic tube and get a culture 10 days prior to the Botox injection.

## 2019-10-23 NOTE — TELEPHONE ENCOUNTER
----- Message from Isabel Vang RN sent at 10/22/2019  4:08 PM CDT -----  Pt is scheduled for cystoscopy with botox injection by dr. Mayo  10/29 Request ASA instruction please.          SANTHOSH Vang RN BC  Pre-op anesthesia

## 2019-10-23 NOTE — PRE-PROCEDURE INSTRUCTIONS
Reviewed meds & instructions, pt. States doesn't know how to access portal. Will mail instructions. Pt has pain pump, states filled by dr. Hillman.

## 2019-10-23 NOTE — H&P (VIEW-ONLY)
CHIEF COMPLAINT:    Mrs. Hawley is a 64 y.o. female presenting for a post op status post obstructing autologous sling 9/3/2019.    PRESENTING ILLNESS:    Tasha Hawley is a 64 y.o. female who states she is much better.  Right after surgery, she did not need to wear a pad.  She got a UTI and then started having mixed incontinence symptoms.  The most leaking she has is associated with a bladder spasm.  She does not tend to leak around the catheter.  She also has some stress incontinence when transferring from the wheel chair to her bed.  But it is less that it was previously.  She last had an injection of Botox in March 2018.      The patient had the catheter changed on the 17th, and a urine was sent.  She was placed on ampicillin on the 18th.  States that this has not improved her urinary incontinence.  However, the malodorous urine and cloudiness has improved.  She feels better.     REVIEW OF SYSTEMS:    Review of Systems   Constitutional: Negative.    HENT: Negative.    Eyes: Negative.    Respiratory: Negative.    Cardiovascular: Negative.    Gastrointestinal: Negative.    Genitourinary: Positive for urgency.   Musculoskeletal: Positive for back pain.   Skin: Negative.    Neurological: Negative.    Endo/Heme/Allergies: Negative.    Psychiatric/Behavioral: Positive for depression.       PATIENT HISTORY:    Past Medical History:   Diagnosis Date    Anticoagulant long-term use     Anxiety     Arthritis     Bilateral lower extremity edema     severe chronic    Carotid artery occlusion     Cataract     Coronary artery disease     subtotalled LAD with collateral    Depression     Fever blister     Hypothyroid     Iron deficiency anemia     Lumbar radiculopathy     with chronic pain    Ocular migraine     Sleep apnea     cpap       Past Surgical History:   Procedure Laterality Date    ADENOIDECTOMY      BACK SURGERY      x 12    CARDIAC CATHETERIZATION  2016    subtotalled LAD with right to left  collaterals    CATARACT EXTRACTION W/  INTRAOCULAR LENS IMPLANT Left     Dr Coleman     CYSTOSCOPIC LITHOLAPAXY N/A 6/27/2019    Procedure: CYSTOLITHOLAPAXY;  Surgeon: Shireen Mayo MD;  Location: [facility] OR 2ND FLR;  Service: Urology;  Laterality: N/A;    CYSTOSCOPIC LITHOLAPAXY N/A 9/3/2019    Procedure: CYSTOLITHOLAPAXY;  Surgeon: Shireen Mayo MD;  Location: [facility] OR 2ND FLR;  Service: Urology;  Laterality: N/A;    ESOPHAGOGASTRODUODENOSCOPY N/A 5/23/2018    Procedure: ESOPHAGOGASTRODUODENOSCOPY (EGD);  Surgeon: Prince Vance MD;  Location: [facility] (4TH FLR);  Service: Endoscopy;  Laterality: N/A;  r/s 'd per Dr. Vance due to family emergency- ER    HYSTERECTOMY  1975    endometriosis    pain pump placement      SQ Dilaudid Pump managed by Dr. Hillman, Pain Management    SPINAL CORD STIMULATOR REMOVAL      before [place]    SPINE SURGERY  5-13-13    CERVICAL FUSION    TONSILLECTOMY         Family History   Problem Relation Age of Onset    Cancer Mother 55        breast    Cancer Father         esophagus,had laryngectomy    Esophageal cancer Father     Parkinsonism Maternal Grandmother     Tremor Maternal Grandmother     Heart disease Maternal Uncle      Socioeconomic History    Marital status:    Tobacco Use    Smoking status: Never Smoker    Smokeless tobacco: Never Used   Substance and Sexual Activity    Alcohol use: Never     Frequency: Never    Drug use: No    Sexual activity: Never       Allergies:  (d)-limonene flavor; Bactrim [sulfamethoxazole-trimethoprim]; Benadryl [diphenhydramine hcl]; Imitrex [sumatriptan succinate]; Topamax [topiramate]; Vancomycin; Butorphanol tartrate; Darvocet a500 [propoxyphene n-acetaminophen]; Fentanyl; White petrolatum-zinc; Zinc oxide-white petrolatum; Latex, natural rubber; and Phenytoin    Medications:  Outpatient Encounter Medications as of 10/23/2019   Medication Sig Dispense Refill    ampicillin (PRINCIPEN) 500 MG capsule Take 1  capsule (500 mg total) by mouth every 6 (six) hours. 28 capsule 0    aspirin (ECOTRIN) 81 MG EC tablet Take 1 tablet (81 mg total) by mouth once daily.  0    atorvastatin (LIPITOR) 80 MG tablet TAKE 1 TABLET BY MOUTH DAILY 90 tablet 3    butalbital-acetaminophen-caffeine -40 mg (FIORICET, ESGIC) -40 mg per tablet       cholecalciferol, vitamin D3, (VITAMIN D3) 5,000 unit Tab Take 5,000 Units by mouth once daily.      docusate sodium (COLACE) 100 MG capsule Take 1 capsule (100 mg total) by mouth 3 (three) times daily as needed for constipation. 180 capsule 3    FLUoxetine 20 MG capsule Take 3 capsules (60 mg total) by mouth once daily. 270 capsule 3    furosemide (LASIX) 40 MG tablet Take 1 tablet (40 mg total) by mouth once daily. (Patient taking differently: Take 40 mg by mouth daily as needed (SWELLING). ) 90 tablet 3    HYDROcodone-acetaminophen (NORCO)  mg per tablet Take 1 tablet by mouth every 6 (six) hours as needed for Pain. 15 tablet 0    hydrocodone-acetaminophen 10-325mg (NORCO)  mg Tab Take 2 tablets by mouth every 4 (four) hours as needed for Pain.       levothyroxine (SYNTHROID) 125 MCG tablet Take 1 tablet (125 mcg total) by mouth before breakfast. 90 tablet 3    lidocaine (LIDODERM) 5 % APPLY 1 PATCH DAILY AND WEAR FOR A MAXIMUM OF 12 HOURS  5    oxybutynin (DITROPAN XL) 15 MG TR24 Take 1 tablet (15 mg total) by mouth once daily. 30 tablet 11    pantoprazole (PROTONIX) 40 MG tablet Take 1 tablet (40 mg total) by mouth once daily. 90 tablet 3    potassium citrate (UROCIT-K) 10 mEq (1,080 mg) TbSR Take 1 tablet (10 mEq total) by mouth 3 (three) times daily with meals. 90 tablet 11    promethazine (PHENERGAN) 25 MG tablet Take 25 mg by mouth every 6 (six) hours as needed for Nausea.      QUEtiapine (SEROQUEL) 100 MG Tab TAKE 1 TABLET (100 MG TOTAL) BY MOUTH EVERY EVENING. 30 tablet 2    QUEtiapine (SEROQUEL) 100 MG Tab Take 1 tablet (100 mg total) by mouth every  evening. 90 tablet 3    SENNOSIDES (SENNA LAX ORAL) Take 20 mg by mouth every evening.       traZODone (DESYREL) 100 MG tablet Take 3 tablets (300 mg total) by mouth every evening. 270 tablet 1    [DISCONTINUED] lamotrigine (LAMICTAL) 25 MG tablet Take 1 tablet (25 mg total) by mouth once daily. 30 tablet 6     No facility-administered encounter medications on file as of 10/23/2019.          PHYSICAL EXAMINATION:    The patient generally appears in good health, is appropriately interactive, and is in no apparent distress.    Skin: No lesions.    Mental: Cooperative with normal affect.    Neuro: Grossly intact.    HEENT: Normal. No evidence of lymphadenopathy.    Chest:  normal inspiratory effort.    Abdomen:  Soft, non-tender. No masses or organomegaly. Bladder is not palpable. No evidence of flank discomfort. No evidence of inguinal hernia.  The two incisions have healed well.  The lower one has some scar tissue.      Extremities: No clubbing, cyanosis, or edema    Normal external female genitalia  Urethral meatus is normal  Urethra and bladder are nontender to bimanual exam.  Well healed, no tenderness.   Well supported anteriorly and posteriorly   Uterus and cervix are surgically absent  No adnexal masses    LABS:    Lab Results   Component Value Date    BUN 7 (L) 09/04/2019    CREATININE 0.7 09/04/2019     IMPRESSION:    Encounter Diagnoses   Name Primary?    Post-operative state Yes    Urge incontinence     Neurogenic bladder        PLAN:    1. Consented for Botox injection of the bladder  2.  Reassured her about the scar tissue.    3.  Plan to have home health change the suprapubic tube and get a culture 10 days prior to the Botox injection.

## 2019-10-23 NOTE — TELEPHONE ENCOUNTER
Received a fax from [facility] laboratory urine culture from 10/17/2019.  Patient had Proteus mirabilis, sensitive to ampicillin which was sent on 10/18/2019.      Hard copy sent to the scanner

## 2019-10-28 ENCOUNTER — TELEPHONE (OUTPATIENT)
Dept: UROLOGY | Facility: CLINIC | Age: 64
End: 2019-10-28

## 2019-10-28 PROCEDURE — G0179 PR HOME HEALTH MD RECERTIFICATION: ICD-10-PCS | Mod: ,,, | Performed by: UROLOGY

## 2019-10-28 PROCEDURE — G0179 MD RECERTIFICATION HHA PT: HCPCS | Mod: ,,, | Performed by: UROLOGY

## 2019-10-28 NOTE — TELEPHONE ENCOUNTER
Called pt's  to confirm 11:30am arrival time for procedure. Gave pt's  NPO instructions and gave pt's  opportunity to ask questions. Pt's  verbalized understanding.

## 2019-10-29 ENCOUNTER — HOSPITAL ENCOUNTER (OUTPATIENT)
Facility: HOSPITAL | Age: 64
Discharge: HOME | End: 2019-10-29
Attending: UROLOGY | Admitting: UROLOGY
Payer: MEDICARE

## 2019-10-29 ENCOUNTER — OFFICE VISIT (OUTPATIENT)
Dept: PSYCHIATRY | Facility: CLINIC | Age: 64
End: 2019-10-29
Payer: MEDICARE

## 2019-10-29 VITALS
WEIGHT: 169 LBS | HEART RATE: 41 BPM | DIASTOLIC BLOOD PRESSURE: 55 MMHG | BODY MASS INDEX: 26.53 KG/M2 | RESPIRATION RATE: 19 BRPM | HEIGHT: 67 IN | OXYGEN SATURATION: 96 % | SYSTOLIC BLOOD PRESSURE: 112 MMHG | TEMPERATURE: 98 F

## 2019-10-29 VITALS
WEIGHT: 169.88 LBS | BODY MASS INDEX: 29.16 KG/M2 | DIASTOLIC BLOOD PRESSURE: 50 MMHG | HEART RATE: 63 BPM | SYSTOLIC BLOOD PRESSURE: 86 MMHG

## 2019-10-29 DIAGNOSIS — F33.41 RECURRENT MAJOR DEPRESSION IN PARTIAL REMISSION: Primary | ICD-10-CM

## 2019-10-29 DIAGNOSIS — G47.00 INSOMNIA, UNSPECIFIED TYPE: ICD-10-CM

## 2019-10-29 DIAGNOSIS — N30.90 BLADDER INFECTION: ICD-10-CM

## 2019-10-29 DIAGNOSIS — R32 URINARY INCONTINENCE: ICD-10-CM

## 2019-10-29 DIAGNOSIS — F41.9 ANXIETY: ICD-10-CM

## 2019-10-29 DIAGNOSIS — Z09 SURGICAL FOLLOWUP: ICD-10-CM

## 2019-10-29 PROCEDURE — 87086 URINE CULTURE/COLONY COUNT: CPT | Mod: HCNC

## 2019-10-29 PROCEDURE — 3008F PR BODY MASS INDEX (BMI) DOCUMENTED: ICD-10-PCS | Mod: HCNC,CPTII,S$GLB, | Performed by: PSYCHIATRY & NEUROLOGY

## 2019-10-29 PROCEDURE — 99499 RISK ADDL DX/OHS AUDIT: ICD-10-PCS | Mod: HCNC,S$GLB,, | Performed by: PSYCHIATRY & NEUROLOGY

## 2019-10-29 PROCEDURE — 99499 UNLISTED E&M SERVICE: CPT | Mod: HCNC,S$GLB,, | Performed by: PSYCHIATRY & NEUROLOGY

## 2019-10-29 PROCEDURE — 99999 PR PBB SHADOW E&M-EST. PATIENT-LVL III: ICD-10-PCS | Mod: PBBFAC,HCNC,, | Performed by: PSYCHIATRY & NEUROLOGY

## 2019-10-29 PROCEDURE — 99214 OFFICE O/P EST MOD 30 MIN: CPT | Mod: HCNC,S$GLB,, | Performed by: PSYCHIATRY & NEUROLOGY

## 2019-10-29 PROCEDURE — 87088 URINE BACTERIA CULTURE: CPT | Mod: HCNC

## 2019-10-29 PROCEDURE — 63600175 PHARM REV CODE 636 W HCPCS: Mod: HCNC | Performed by: STUDENT IN AN ORGANIZED HEALTH CARE EDUCATION/TRAINING PROGRAM

## 2019-10-29 PROCEDURE — 99999 PR PBB SHADOW E&M-EST. PATIENT-LVL III: CPT | Mod: PBBFAC,HCNC,, | Performed by: PSYCHIATRY & NEUROLOGY

## 2019-10-29 PROCEDURE — 87077 CULTURE AEROBIC IDENTIFY: CPT | Mod: HCNC

## 2019-10-29 PROCEDURE — 3008F BODY MASS INDEX DOCD: CPT | Mod: HCNC,CPTII,S$GLB, | Performed by: PSYCHIATRY & NEUROLOGY

## 2019-10-29 PROCEDURE — 99214 PR OFFICE/OUTPT VISIT, EST, LEVL IV, 30-39 MIN: ICD-10-PCS | Mod: HCNC,S$GLB,, | Performed by: PSYCHIATRY & NEUROLOGY

## 2019-10-29 PROCEDURE — 87186 SC STD MICRODIL/AGAR DIL: CPT | Mod: HCNC

## 2019-10-29 RX ORDER — SODIUM CHLORIDE 9 MG/ML
INJECTION, SOLUTION INTRAVENOUS CONTINUOUS
Status: DISCONTINUED | OUTPATIENT
Start: 2019-10-29 | End: 2019-10-29 | Stop reason: HOSPADM

## 2019-10-29 RX ORDER — TRAZODONE HYDROCHLORIDE 100 MG/1
300 TABLET ORAL NIGHTLY
Qty: 270 TABLET | Refills: 1 | Status: SHIPPED | OUTPATIENT
Start: 2019-10-29 | End: 2020-02-04 | Stop reason: SDUPTHER

## 2019-10-29 RX ORDER — AMPICILLIN 500 MG/1
500 CAPSULE ORAL 4 TIMES DAILY
Qty: 28 CAPSULE | Refills: 0 | Status: SHIPPED | OUTPATIENT
Start: 2019-10-29 | End: 2019-11-05

## 2019-10-29 RX ORDER — FLUOXETINE HYDROCHLORIDE 20 MG/1
60 CAPSULE ORAL DAILY
Qty: 270 CAPSULE | Refills: 3 | Status: SHIPPED | OUTPATIENT
Start: 2019-10-29 | End: 2020-02-04 | Stop reason: SDUPTHER

## 2019-10-29 RX ORDER — QUETIAPINE FUMARATE 100 MG/1
TABLET, FILM COATED ORAL
Qty: 90 TABLET | Refills: 1 | Status: SHIPPED | OUTPATIENT
Start: 2019-10-29 | End: 2020-02-04 | Stop reason: SDUPTHER

## 2019-10-29 RX ORDER — FLUCONAZOLE 2 MG/ML
INJECTION, SOLUTION INTRAVENOUS
Status: DISCONTINUED
Start: 2019-10-29 | End: 2019-10-29 | Stop reason: WASHOUT

## 2019-10-29 RX ORDER — AMPICILLIN 500 MG/1
500 CAPSULE ORAL 4 TIMES DAILY
Qty: 28 CAPSULE | Refills: 0 | Status: SHIPPED | OUTPATIENT
Start: 2019-10-29 | End: 2019-10-29 | Stop reason: SDUPTHER

## 2019-10-29 RX ORDER — AMPICILLIN 1 G/1
INJECTION, POWDER, FOR SOLUTION INTRAMUSCULAR; INTRAVENOUS
Status: DISCONTINUED
Start: 2019-10-29 | End: 2019-10-29 | Stop reason: WASHOUT

## 2019-10-29 RX ORDER — FLUCONAZOLE 2 MG/ML
400 INJECTION, SOLUTION INTRAVENOUS
Status: DISCONTINUED | OUTPATIENT
Start: 2019-10-29 | End: 2019-10-29

## 2019-10-29 RX ORDER — CEFTRIAXONE 1 G/1
1 INJECTION, POWDER, FOR SOLUTION INTRAMUSCULAR; INTRAVENOUS ONCE
Status: COMPLETED | OUTPATIENT
Start: 2019-10-29 | End: 2019-10-29

## 2019-10-29 RX ADMIN — CEFTRIAXONE SODIUM 1 G: 1 INJECTION, POWDER, FOR SOLUTION INTRAMUSCULAR; INTRAVENOUS at 01:10

## 2019-10-29 NOTE — INTERVAL H&P NOTE
The patient has been examined and the H&P has been reviewed:    I concur with the findings and no changes have occurred since H&P was written.   Urine dipstick today positive for nitrites and blood, negative for LE. Patient reports feeling worsening frequency and increased sediment, thinks she has a UTI. Gave 1g Rocephin IV and sent Rx for ampicillin x 7 days to patient's pharmacy. Urine specimen from suprapubic tube sent for culture. If patient feels better tomorrow, will reschedule for 10/31. Otherwise, will call to reschedule.    Anesthesia/Surgery risks, benefits and alternative options discussed and understood by patient/family.          Active Hospital Problems    Diagnosis  POA    Urinary incontinence [R32]  Yes      Resolved Hospital Problems   No resolved problems to display.     Discussed with the patient.  She states that symptoms worsened 2 days ago.  The catheter was also changed 2 days ago.  Urine from the suprapubic tube was sent for culture.  And antibiotics given as above.

## 2019-10-29 NOTE — DISCHARGE SUMMARY
[facility]  Discharge Note  Short Stay    Admit Date: 10/29/2019    Discharge Date and Time: 10/29/2019 1:04 PM      Attending Physician: Shireen Mayo MD     Discharge Provider: Chau Briones    Diagnoses:  Active Hospital Problems    Diagnosis  POA    Urinary incontinence [R32]  Yes      Resolved Hospital Problems   No resolved problems to display.       Discharged Condition: good    Hospital Course: Patient was admitted for cystoscopy with Botox injection; however, urine was nitrite+. Gave IV Rocephin 1g and sent rx for ampicillin x 7 days. Urine from suprapubic tube sent for culture The patient was discharged home in good condition on the same day.       The patient had home health change the suprapubic tube 2 days ago.     Final Diagnoses: Same as principal problem.    Disposition: Home or Self Care    Follow up/Patient Instructions:    Medications:  Reconciled Home Medications:   Current Discharge Medication List      CONTINUE these medications which have CHANGED    Details   ampicillin (PRINCIPEN) 500 MG capsule Take 1 capsule (500 mg total) by mouth 4 (four) times daily for 7 days  Qty: 28 capsule, Refills: 0    Associated Diagnoses: Bladder infection         CONTINUE these medications which have NOT CHANGED    Details   aspirin (ECOTRIN) 81 MG EC tablet Take 1 tablet (81 mg total) by mouth once daily.  Refills: 0      atorvastatin (LIPITOR) 80 MG tablet TAKE 1 TABLET BY MOUTH DAILY  Qty: 90 tablet, Refills: 3      butalbital-acetaminophen-caffeine -40 mg (FIORICET, ESGIC) -40 mg per tablet       docusate sodium (COLACE) 100 MG capsule Take 1 capsule (100 mg total) by mouth 3 (three) times daily as needed for constipation.  Qty: 180 capsule, Refills: 3    Associated Diagnoses: Constipation, unspecified constipation type      furosemide (LASIX) 40 MG tablet Take 1 tablet (40 mg total) by mouth once daily.  Qty: 90 tablet, Refills: 3    Associated Diagnoses: Hypotension,  unspecified hypotension type      !! HYDROcodone-acetaminophen (NORCO)  mg per tablet Take 1 tablet by mouth every 6 (six) hours as needed for Pain.  Qty: 15 tablet, Refills: 0      levothyroxine (SYNTHROID) 125 MCG tablet Take 1 tablet (125 mcg total) by mouth before breakfast.  Qty: 90 tablet, Refills: 3    Associated Diagnoses: Primary hypothyroidism      lidocaine (LIDODERM) 5 % APPLY 1 PATCH DAILY AND WEAR FOR A MAXIMUM OF 12 HOURS  Refills: 5      oxybutynin (DITROPAN XL) 15 MG TR24 Take 1 tablet (15 mg total) by mouth once daily.  Qty: 30 tablet, Refills: 11    Associated Diagnoses: Detrusor instability      pantoprazole (PROTONIX) 40 MG tablet Take 1 tablet (40 mg total) by mouth once daily.  Qty: 90 tablet, Refills: 3    Associated Diagnoses: Esophageal dysphagia      potassium citrate (UROCIT-K) 10 mEq (1,080 mg) TbSR Take 1 tablet (10 mEq total) by mouth 3 (three) times daily with meals.  Qty: 90 tablet, Refills: 11    Associated Diagnoses: Recurrent UTI      promethazine (PHENERGAN) 25 MG tablet Take 25 mg by mouth every 6 (six) hours as needed for Nausea.      cholecalciferol, vitamin D3, (VITAMIN D3) 5,000 unit Tab Take 5,000 Units by mouth once daily.      FLUoxetine 20 MG capsule Take 3 capsules (60 mg total) by mouth once daily.  Qty: 270 capsule, Refills: 3    Associated Diagnoses: Anxiety      !! hydrocodone-acetaminophen 10-325mg (NORCO)  mg Tab Take 2 tablets by mouth every 4 (four) hours as needed for Pain.       QUEtiapine (SEROQUEL) 100 MG Tab TAKE 1 TABLET (100 MG TOTAL) BY MOUTH EVERY EVENING.  Qty: 90 tablet, Refills: 1      SENNOSIDES (SENNA LAX ORAL) Take 20 mg by mouth every evening.       traZODone (DESYREL) 100 MG tablet Take 3 tablets (300 mg total) by mouth every evening.  Qty: 270 tablet, Refills: 1    Associated Diagnoses: Insomnia, unspecified type       !! - Potential duplicate medications found. Please discuss with provider.      STOP taking these medications        lamotrigine (LAMICTAL) 25 MG tablet Comments:   Reason for Stopping:             Discharge Procedure Orders   Diet general     Call MD for:  extreme fatigue     Call MD for:  persistent dizziness or light-headedness     Call MD for:  hives     Call MD for:  redness, tenderness, or signs of infection (pain, swelling, redness, odor or green/yellow discharge around incision site)     Call MD for:  difficulty breathing, headache or visual disturbances     Call MD for:  severe uncontrolled pain     Call MD for:  persistent nausea and vomiting     Call MD for:  temperature >100.4     As above.

## 2019-10-29 NOTE — PROGRESS NOTES
Outpatient Psychiatry Follow-Up Visit (MD/NP)    10/29/2019    Clinical Status of Patient:  Outpatient (Ambulatory)    Chief Complaint:  Tasha Hawley is a 64 y.o. female who presents today for follow-up of depression and anxiety.  Met with patient.      Interval History and Content of Current Session:  Interim Events/Subjective Report/Content of Current Session: She is about the same.  She will have a bladder procedure later today.  Depression well controlled.  We discussed my alf.  She will follow up with a staff psychiatrist.    Psychotherapy:  · Target symptoms: depression  · Why chosen therapy is appropriate versus another modality: relevant to diagnosis  · Outcome monitoring methods: self-report, observation  · Therapeutic intervention type: supportive psychotherapy  · Topics discussed/themes: building skills sets for symptom management, symptom recognition  · The patient's response to the intervention is accepting. The patient's progress toward treatment goals is good.   · Duration of intervention: 10 minutes.    Review of Systems   · PSYCHIATRIC: Pertinant items are noted in the narrative.    Past Medical, Family and Social History: The patient's past medical, family and social history have been reviewed and updated as appropriate within the electronic medical record - see encounter notes.    Compliance: yes    Side effects: None    Risk Parameters:  Patient reports no suicidal ideation  Patient reports no homicidal ideation  Patient reports no self-injurious behavior  Patient reports no violent behavior    Exam (detailed: at least 9 elements; comprehensive: all 15 elements)   Constitutional  Vitals:  Most recent vital signs, dated less than 90 days prior to this appointment, were reviewed.   There were no vitals filed for this visit.     General:  unremarkable, age appropriate     Musculoskeletal  Muscle Strength/Tone:  no tremor   Gait & Station:  uses walker     Psychiatric  Speech:  no  latency; no press   Mood & Affect:  anxious  congruent and appropriate   Thought Process:  normal and logical   Associations:  intact   Thought Content:  normal, no suicidality, no homicidality, delusions, or paranoia   Insight:  intact   Judgement: behavior is adequate to circumstances   Orientation:  grossly intact   Memory: intact for content of interview   Language: grossly intact   Attention Span & Concentration:  able to focus   Fund of Knowledge:  intact and appropriate to age and level of education     Assessment and Diagnosis   Status/Progress: Based on the examination today, the patient's problem(s) is/are well controlled.  New problems have not been presented today.   Co-morbidities are complicating management of the primary condition.  There are no active rule-out diagnoses for this patient at this time.     General Impression: Depression well controlled      ICD-10-CM ICD-9-CM   1. Insomnia, unspecified type G47.00 780.52   2. Anxiety F41.9 300.00       Intervention/Counseling/Treatment Plan   · Medication Management: Continue current medications.      Return to Clinic: 3 months

## 2019-10-30 ENCOUNTER — TELEPHONE (OUTPATIENT)
Dept: UROLOGY | Facility: CLINIC | Age: 64
End: 2019-10-30

## 2019-10-30 DIAGNOSIS — N31.9 NEUROGENIC BLADDER: Primary | ICD-10-CM

## 2019-10-30 NOTE — TELEPHONE ENCOUNTER
----- Message from Rosemarie Collazo sent at 10/30/2019 10:51 AM CDT -----  Contact: Patient   Patient state that she is calling to speak to nurse regarding procedure she is supposed to be scheduled for on tomorrow 10/31.    Patient can be reached at [phone]

## 2019-10-30 NOTE — TELEPHONE ENCOUNTER
Called pt to confirm 11:15am arrival time for procedure. Gave pt NPO instructions and gave pt opportunity to ask questions. Pt verbalized understanding.

## 2019-10-31 ENCOUNTER — HOSPITAL ENCOUNTER (OUTPATIENT)
Facility: HOSPITAL | Age: 64
Discharge: HOME | End: 2019-10-31
Attending: UROLOGY | Admitting: UROLOGY
Payer: MEDICARE

## 2019-10-31 ENCOUNTER — ANESTHESIA EVENT (OUTPATIENT)
Dept: SURGERY | Facility: HOSPITAL | Age: 64
End: 2019-10-31
Payer: MEDICARE

## 2019-10-31 ENCOUNTER — ANESTHESIA (OUTPATIENT)
Dept: SURGERY | Facility: HOSPITAL | Age: 64
End: 2019-10-31
Payer: MEDICARE

## 2019-10-31 VITALS
HEART RATE: 52 BPM | SYSTOLIC BLOOD PRESSURE: 124 MMHG | OXYGEN SATURATION: 100 % | HEIGHT: 64 IN | TEMPERATURE: 98 F | DIASTOLIC BLOOD PRESSURE: 58 MMHG | WEIGHT: 165 LBS | RESPIRATION RATE: 18 BRPM | BODY MASS INDEX: 28.17 KG/M2

## 2019-10-31 DIAGNOSIS — N31.9 NEUROGENIC BLADDER: ICD-10-CM

## 2019-10-31 PROCEDURE — 36000707: Mod: HCNC | Performed by: UROLOGY

## 2019-10-31 PROCEDURE — 71000015 HC POSTOP RECOV 1ST HR: Mod: HCNC | Performed by: UROLOGY

## 2019-10-31 PROCEDURE — D9220A PRA ANESTHESIA: Mod: HCNC,ANES,, | Performed by: ANESTHESIOLOGY

## 2019-10-31 PROCEDURE — 25000003 PHARM REV CODE 250: Mod: HCNC | Performed by: REGISTERED NURSE

## 2019-10-31 PROCEDURE — 63600175 PHARM REV CODE 636 W HCPCS: Mod: HCNC | Performed by: STUDENT IN AN ORGANIZED HEALTH CARE EDUCATION/TRAINING PROGRAM

## 2019-10-31 PROCEDURE — D9220A PRA ANESTHESIA: ICD-10-PCS | Mod: HCNC,ANES,, | Performed by: ANESTHESIOLOGY

## 2019-10-31 PROCEDURE — 52287 CYSTOSCOPY CHEMODENERVATION: CPT | Mod: HCNC,58,, | Performed by: UROLOGY

## 2019-10-31 PROCEDURE — 52287 PR CYSTOURETHROSCOPY WITH INJ FOR CHEMODENERVATION: ICD-10-PCS | Mod: HCNC,58,, | Performed by: UROLOGY

## 2019-10-31 PROCEDURE — 37000009 HC ANESTHESIA EA ADD 15 MINS: Mod: HCNC | Performed by: UROLOGY

## 2019-10-31 PROCEDURE — 37000008 HC ANESTHESIA 1ST 15 MINUTES: Mod: HCNC | Performed by: UROLOGY

## 2019-10-31 PROCEDURE — 63600175 PHARM REV CODE 636 W HCPCS: Mod: HCNC | Performed by: REGISTERED NURSE

## 2019-10-31 PROCEDURE — 36000706: Mod: HCNC | Performed by: UROLOGY

## 2019-10-31 PROCEDURE — 71000044 HC DOSC ROUTINE RECOVERY FIRST HOUR: Mod: HCNC | Performed by: UROLOGY

## 2019-10-31 PROCEDURE — 63600175 PHARM REV CODE 636 W HCPCS: Mod: JG,HCNC | Performed by: UROLOGY

## 2019-10-31 RX ORDER — SODIUM CHLORIDE 9 MG/ML
INJECTION, SOLUTION INTRAVENOUS CONTINUOUS
Status: DISCONTINUED | OUTPATIENT
Start: 2019-10-31 | End: 2019-10-31 | Stop reason: HOSPADM

## 2019-10-31 RX ORDER — AMPICILLIN 1 G/1
INJECTION, POWDER, FOR SOLUTION INTRAMUSCULAR; INTRAVENOUS
Status: COMPLETED
Start: 2019-10-31 | End: 2019-10-31

## 2019-10-31 RX ORDER — MIDAZOLAM HYDROCHLORIDE 1 MG/ML
INJECTION, SOLUTION INTRAMUSCULAR; INTRAVENOUS
Status: DISCONTINUED | OUTPATIENT
Start: 2019-10-31 | End: 2019-10-31

## 2019-10-31 RX ORDER — GLYCOPYRROLATE 0.2 MG/ML
INJECTION INTRAMUSCULAR; INTRAVENOUS
Status: DISCONTINUED | OUTPATIENT
Start: 2019-10-31 | End: 2019-10-31

## 2019-10-31 RX ORDER — ONDANSETRON 2 MG/ML
4 INJECTION INTRAMUSCULAR; INTRAVENOUS ONCE AS NEEDED
Status: DISCONTINUED | OUTPATIENT
Start: 2019-10-31 | End: 2019-10-31 | Stop reason: HOSPADM

## 2019-10-31 RX ORDER — PROPOFOL 10 MG/ML
VIAL (ML) INTRAVENOUS CONTINUOUS PRN
Status: DISCONTINUED | OUTPATIENT
Start: 2019-10-31 | End: 2019-10-31

## 2019-10-31 RX ORDER — FENTANYL CITRATE 50 UG/ML
INJECTION, SOLUTION INTRAMUSCULAR; INTRAVENOUS
Status: DISCONTINUED | OUTPATIENT
Start: 2019-10-31 | End: 2019-10-31

## 2019-10-31 RX ORDER — KETAMINE HCL IN 0.9 % NACL 50 MG/5 ML
SYRINGE (ML) INTRAVENOUS
Status: DISCONTINUED | OUTPATIENT
Start: 2019-10-31 | End: 2019-10-31

## 2019-10-31 RX ORDER — LIDOCAINE HCL/PF 100 MG/5ML
SYRINGE (ML) INTRAVENOUS
Status: DISCONTINUED | OUTPATIENT
Start: 2019-10-31 | End: 2019-10-31

## 2019-10-31 RX ORDER — HYDROCODONE BITARTRATE AND ACETAMINOPHEN 5; 325 MG/1; MG/1
1 TABLET ORAL EVERY 4 HOURS PRN
Status: DISCONTINUED | OUTPATIENT
Start: 2019-10-31 | End: 2019-10-31 | Stop reason: HOSPADM

## 2019-10-31 RX ORDER — IBUPROFEN 600 MG/1
600 TABLET ORAL EVERY 6 HOURS PRN
Qty: 30 TABLET | Refills: 0 | Status: SHIPPED | OUTPATIENT
Start: 2019-10-31 | End: 2019-11-07

## 2019-10-31 RX ADMIN — LIDOCAINE HYDROCHLORIDE 50 MG: 20 INJECTION, SOLUTION INTRAVENOUS at 01:10

## 2019-10-31 RX ADMIN — MIDAZOLAM HYDROCHLORIDE 2 MG: 1 INJECTION, SOLUTION INTRAMUSCULAR; INTRAVENOUS at 12:10

## 2019-10-31 RX ADMIN — Medication 10 MG: at 01:10

## 2019-10-31 RX ADMIN — FENTANYL CITRATE 50 MCG: 50 INJECTION, SOLUTION INTRAMUSCULAR; INTRAVENOUS at 01:10

## 2019-10-31 RX ADMIN — FENTANYL CITRATE 25 MCG: 50 INJECTION, SOLUTION INTRAMUSCULAR; INTRAVENOUS at 01:10

## 2019-10-31 RX ADMIN — SODIUM CHLORIDE: 0.9 INJECTION, SOLUTION INTRAVENOUS at 12:10

## 2019-10-31 RX ADMIN — AMPICILLIN SODIUM 1 G: 1 INJECTION, POWDER, FOR SOLUTION INTRAMUSCULAR; INTRAVENOUS at 01:10

## 2019-10-31 RX ADMIN — GENTAMICIN SULFATE 240 MG: 40 INJECTION, SOLUTION INTRAMUSCULAR; INTRAVENOUS at 01:10

## 2019-10-31 RX ADMIN — PROPOFOL 150 MCG/KG/MIN: 10 INJECTION, EMULSION INTRAVENOUS at 01:10

## 2019-10-31 RX ADMIN — GLYCOPYRROLATE 0.2 MG: 0.2 INJECTION, SOLUTION INTRAMUSCULAR; INTRAVENOUS at 01:10

## 2019-10-31 NOTE — PLAN OF CARE
Provided with DC instructions, answered all questions. Tolerated procedure and anesthesia well. Waiting on prescription and Dr. Mayo to speak with patient.

## 2019-10-31 NOTE — OP NOTE
[facility]  Operative Note    Date: 10/31/2019    Pre-Op Diagnosis:  Neurogenic bladder     Patient Active Problem List    Diagnosis Date Noted    Urinary incontinence 10/29/2019    Inflammatory spondylopathy of lumbar region 08/29/2019    Suprapubic catheter dysfunction 07/16/2019    Preop cardiovascular exam 05/23/2019    Chronic venous insufficiency 05/23/2019    Mixed stress and urge urinary incontinence 05/13/2019    Dyspnea 02/22/2019    Hypotension 02/22/2019    Costochondritis 02/22/2019    Urinary tract infection 12/28/2018    Recurrent UTI (urinary tract infection) 06/08/2018    Esophageal dysphagia 05/23/2018    Ischemic cardiomyopathy 03/08/2018    Suprapubic catheter 02/01/2018    Insomnia due to medical condition 12/08/2017    Bilateral carotid artery disease 11/14/2017    Scoliosis deformity of spine 03/31/2017    S/P insertion of spinal cord stimulator 03/31/2017    S/P insertion of intrathecal pump 03/31/2017    Paresthesia of both lower extremities 03/31/2017    Degenerative disc disease, lumbar 03/31/2017    Osteoarthritis of spine with radiculopathy, lumbar region 03/31/2017    Facet arthropathy, lumbar 03/31/2017    Lymphedema of both lower extremities 03/02/2017    Primary hypothyroidism 02/02/2017    Frequent falls 02/01/2017    Weight loss, unintentional 02/01/2017    Urinary retention 12/21/2016    Neurogenic bladder 09/27/2016    Drug-induced constipation 08/16/2016    GERD (gastroesophageal reflux disease) 08/16/2016    Coronary artery disease involving native coronary artery of native heart without angina pectoris 08/16/2016    Narcotic dependency, continuous 07/18/2014    Thoracic aorta atherosclerosis 07/18/2014    Cervical myelopathy 05/13/2013    Chronic pain syndrome 05/01/2013    Major depressive disorder, recurrent, mild 02/21/2013    Generalized anxiety disorder     Iron deficiency anemia           Post-Op Diagnosis:  same    Procedure(s) Performed:   1.  Cystoscopy with bladder botox injection 200 U injected  2.   Exchange of suprapubic catheter by MD    Specimen(s): none    Staff Surgeon:  Shireen Mayo MD    Assistant Surgeon: Chau Briones MD    Anesthesia: Monitored Local Anesthesia with Sedation    Indications: Tasha Hawley is a 64 y.o. female with neurogenic bladder with suprapubic catheter in place. She has a history of stress incontinence and is s/p autologous sling placement    Findings:      - 200 U botox in 20 cc injected into bladder detrusor; good wheals raised  - areas of bullous edema and erythema as seen before and previously biopsied; consistent with catheter irritation  - suprapubic catheter exchanged    Estimated Blood Loss: min    Drains: 20 fr silicone suprapubic catheter     Procedure in Detail:  After informed consent was obtained the patient was brought to the cystoscopy suite and placed in the supine position.  SCDs were applied and working.   The patient was placed in the dorsal lithotomy position prior to anesthetic administration.  She was then anesthetized and prepped and draped in the usual sterile fashion.      A rigid cystoscope in a 22 Fr sheath was introduced into the patients's bladder via the urethra.  This passed easily. The urethra was not patulous. Formal cystoscopy was performed which revealed the ureteral orifices in their normal anatomic position bilaterally.  No bladder trabeculations, stones or diverticuli were seen. There were multiple areas of the bladder with slight erythema and bullous edema, consistent with catheterization. These areas have been previously biopsied.     We then proceeded with botox injection.  200 units of botox in 20 cc was injected into the detrusor muscle throughout the bladder.  Good wheals were raised.  The scope was removed.      The suprapubic catheter was then exchanged under sterile conditions for a new 20 fr silicone catheter filled with 30 ml  sterile water.  The cystoscope was inserted to confirm inflation of the balloon inside the bladder. The cystoscope was then removed.    The patient tolerated the procedure well and was transferred to recovery in stable condition.     Disposition:  The patient will follow up with Dr. Mayo in 2 weeks.  She will continue per prescription for Augmentin (7 days total) and was instructed to take ibuprofen as needed for pain.    MD ANH Fuller was present for the key portion of the case and agree with the above note.

## 2019-10-31 NOTE — TRANSFER OF CARE
"Anesthesia Transfer of Care Note    Patient: Tasha Hawley    Procedure(s) Performed: Procedure(s) (LRB):  CYSTOSCOPY,WITH BOTULINUM TOXIN INJECTION 200 units (N/A)  REPLACEMENT, CATHETER-SUPRAPUBIC (N/A)    Patient location: PACU    Anesthesia Type: general    Transport from OR: Transported from OR on 6-10 L/min O2 by face mask with adequate spontaneous ventilation    Post pain: adequate analgesia    Post assessment: tolerated procedure well and no apparent anesthetic complications    Post vital signs: stable    Level of consciousness: sedated    Nausea/Vomiting: no nausea/vomiting    Complications: none    Transfer of care protocol was followed      Last vitals:   Visit Vitals  BP (!) 107/52   Pulse (!) 53   Temp 36.5 °C (97.7 °F) (Temporal)   Resp 16   Ht 5' 4" (1.626 m)   Wt 74.8 kg (165 lb)   LMP  (LMP Unknown)   SpO2 100%   Breastfeeding? No   BMI 28.32 kg/m²     "

## 2019-10-31 NOTE — DISCHARGE INSTRUCTIONS
Anesthesia: Before You Receive Anesthesia  You are scheduled for surgery. Youll receive medicine called anesthesia to keep you from feeling pain during the surgery. This sheet explains steps you may need to take to prepare for anesthesia.    Tests  Your healthcare provider may send you to have certain tests before your procedure. These may include:  · Blood tests. These help show how anesthesia may affect you.  · Electrocardiography (ECG or EKG). This helps show how your heart is working.  · Chest X-ray. This image helps show the health of your heart and lungs.  Medicines  In the weeks before your surgery:  · Tell your healthcare provider and anesthesia provider what medicines you take. This includes aspirin, other over-the-counter medicines, herbs, and vitamins. Be sure to mention if you take illegal drugs. (This will be kept confidential.) Giving this information helps to keep you safe.  · You may be told to change certain medicines you take. Or you may be told to stop taking medicines for a certain amount of time.  · Mention how much alcohol you drink and if you smoke. Also mention whether youre allergic to any medicines.  Other preparations  · Follow any directions you are given for not eating or drinking before surgery.  · If you dont talk to your anesthesia provider before surgery, you will meet the day of the procedure. He or she will explain your anesthesia and answer your questions.  · Arrange for an adult family member or friend to drive you home after the surgery.  Be sure to follow all your healthcare providers instructions. If you dont, your procedure may have to be rescheduled.   Date Last Reviewed: 12/1/2016  © 5035-7628 [employer]. [address] [place] [address], [place] [address]. All rights reserved. This information is not intended as a substitute for professional medical care. Always follow your healthcare professional's instructions.        Cystoscopy    Cystoscopy is a procedure  that lets your doctor look directly inside your urethra and bladder. It can be used to:  · Help diagnose a problem with your urethra, bladder, or kidneys.  · Take a sample (biopsy) of bladder or urethral tissue.  · Treat certain problems (such as removing kidney stones).  · Place a stent to bypass an obstruction.  · Take special X-rays of the kidneys.  Based on the findings, your doctor may recommend other tests or treatments.  What is a cystoscope?  A cystoscope is a telescope-like instrument that contains lenses and fiberoptics (small glass wires that make bright light). The cystoscope may be straight and rigid, or flexible to bend around curves in the urethra. The doctor may look directly into the cystoscope, or project the image onto a monitor.  Getting ready  · Ask your doctor if you should stop taking any medicines before the procedure.  · Ask whether you should avoid eating or drinking anything after midnight before the procedure.  · Follow any other instructions your doctor gives you.  Tell your doctor before the exam if you:  · Take any medicines, such as aspirin or blood thinners  · Have allergies to any medicines  · Are pregnant   The procedure  Cystoscopy is done in the doctors office, surgery center, or hospital. The doctor and a nurse are present during the procedure. It takes only a few minutes, longer if a biopsy, X-ray, or treatment needs to be done.  During the procedure:  · You lie on an exam table on your back, knees bent and legs apart. You are covered with a drape.  · Your urethra and the area around it are washed. Anesthetic jelly may be applied to numb the urethra. Other pain medicine is usually not needed. In some cases, you may be offered a mild sedative to help you relax. If a more extensive procedure is to be done, such as a biopsy or kidney stone removal, general anesthesia may be needed.  · The cystoscope is inserted. A sterile fluid is put into the bladder to expand it. You may feel  pressure from this fluid.  · When the procedure is done, the cystoscope is removed.  After the procedure  If you had a sedative, general anesthesia, or spinal anesthesia, you must have someone drive you home. Once youre home:  · Drink plenty of fluids.  · You may have burning or light bleeding when you urinate--this is normal.  · Medicines may be prescribed to ease any discomfort or prevent infection. Take these as directed.  · Call your doctor if you have heavy bleeding or blood clots, burning that lasts more than a day, a fever over 100°F  (38° C), or trouble urinating.  Date Last Reviewed: 1/1/2017  © 4608-9829 [employer]. [address] [place] [address], [place] [address]. All rights reserved. This information is not intended as a substitute for professional medical care. Always follow your healthcare professional's instructions.

## 2019-10-31 NOTE — ANESTHESIA POSTPROCEDURE EVALUATION
Anesthesia Post Evaluation    Patient: Tasha Hawley    Procedure(s) Performed: Procedure(s) (LRB):  CYSTOSCOPY,WITH BOTULINUM TOXIN INJECTION 200 units (N/A)  REPLACEMENT, CATHETER-SUPRAPUBIC (N/A)    Final Anesthesia Type: general  Patient location during evaluation: PACU  Patient participation: Yes- Able to Participate  Level of consciousness: awake and alert and oriented  Post-procedure vital signs: reviewed and stable  Pain management: adequate  Airway patency: patent  PONV status at discharge: No PONV  Anesthetic complications: no      Cardiovascular status: blood pressure returned to baseline and hemodynamically stable  Respiratory status: unassisted, spontaneous ventilation and room air  Hydration status: euvolemic  Follow-up not needed.          Vitals Value Taken Time   /52 10/31/2019  1:41 PM   Temp 36.5 °C (97.7 °F) 10/31/2019  1:40 PM   Pulse 52 10/31/2019  1:44 PM   Resp 14 10/31/2019  1:44 PM   SpO2 98 % 10/31/2019  1:44 PM   Vitals shown include unvalidated device data.      No case tracking events are documented in the log.      Pain/Low Score: No data recorded

## 2019-10-31 NOTE — ANESTHESIA PREPROCEDURE EVALUATION
Anesthesia Assessment: Preoperative EQUATION     Planned Procedure: Procedure(s) (LRB):  CYSTOSCOPY,WITH BOTULINUM TOXIN INJECTION 200 units (N/A)  Requested Anesthesia Type:Monitor Anesthesia Care  Surgeon: Shireen Mayo MD  Service: Urology  Known or anticipated Date of Surgery:10/29/2019     Surgeon notes: reviewed     Electronic QUestionnaire Assessment completed via nurse interview with patient.         No Aq           Triage considerations:         Previous anesthesia records:GETA   Airway Present Prior to Hospital Arrival?: No Placement Date: 09/03/19 Placement Time: 1155 Method of Intubation: Direct laryngoscopy Inserted by: LULI WELLER CRNA  Airway Device: Endotracheal Tube Mask Ventilation: Easy Intubated: Postinduction Blade: Katlyn #3 Airway Device Size: 7.5 Style: Cuffed Cuff Inflation: Minimal occlusive pressure Inflation Amount (mL): 7 Placement Verified By: Auscultation;Capnometry;ETT Condensation Grade: Grade II Complicating Factors: None Findings Post-Intubation: Positive EtCO2;Bilateral breath sounds;Atraumatic/Condition of teeth unchanged Depth of Insertion (cm): 22 Secured at: Lips Complications: None Breath Sounds: Equal Bilateral Insertion attempts (enter comment if more than 2 attempts): 1 Removal Date: 09/03/19 Removal Time: 1456   Airway Placement Date: 09/03/19 Placement Time: 1155 Method of Intubation: Direct laryngoscopy Inserted by: LULI WELLER CRNA  Airway Device: Endotracheal Tube Airway Device Size: 7.5 Style: Cuffed Depth of Insertion (cm): 22 Inflation Amount (mL): 7 Placement Verified By: Auscultation;Capnometry;ETT Condensation Breath Sounds: Equal Bilateral Insertion attempts (enter comment if more than 2 attempts): 1 Removal Date: 09/03/19 Removal Time: 1456            Last PCP note: within 3 months , within [facility]  Subspecialty notes: Cardiology: General, Gastroenterology, Hematology/Oncology, Neurology, Psychiatry, urology/      Other  important co-morbidities:   Long-term asa use  GONZALO  Arthritis  Carotid artery occlusion  Depression  Hypothyroid  Hx iron deficiency anemia  Lumbar radiculopathy  Cervical mylopathy  DDD  Sleep apnea -- not using c-pap  Current UTI--  On ampicillin  Chronic pain syndrome-----has implanted pain pump dilaudid   Neurogenic bladder  GERD  Scoliosis of spine  Hx lymphedema of alyson lower extremities  Recent fall- 3 weeks ago     Tests already available:  Available tests,  within 3 months , within [facility] .      EKG 12-lead   Order: [id number]   Status:  Final result   Visible to patient:  Yes (Patient Portal) Next appt:  Today at 02:20 PM in Urology (Shireen Mayo MD) Dx:  Chest pain       Narrative   Performed by: GEMUSE   Test Reason : R07.9,    Vent. Rate : 090 BPM     Atrial Rate : 090 BPM     P-R Int : 146 ms          QRS Dur : 098 ms      QT Int : 384 ms       P-R-T Axes : 045 006 058 degrees     QTc Int : 469 ms    Normal sinus rhythm  Nonspecific ST abnormality  Normal ECG  When compared with ECG of 22-FEB-2019 12:02,  No significant change was found  Confirmed by Walter Goldstein MD ([id number]) on 2/26/2019 8:52:51 AM    Referred By: System System           Confirmed By:Walter Goldstein MD      Specimen Collected: 02/23/19 22:27 Last Resulted: 02/26/19 08:52                                         Instructions given. (See in Nurse's note)     Optimization:    Medical Opinion Indicated                            Sub-specialist consult indicated:  Cardiology for asa instructions / PCP                                        Plan:    Testing:  none   Pre-anesthesia  visit                                        Visit focus: none                           Consultation:messaged cardiology for asa instructions// notified dr. Hodges of procedure                            Navigation:              Straight Line to surgery.                          No tests, anesthesia preop clinic visit, or consult required.                                                    Plans per surgeon and Follow-up per Surgeon        Addendum:      Fay Powers MD   Physician   Cardiology   Telephone Encounter    Signed    Encounter Date:  10/23/2019                    Aspirin can be held for one week prior to the surgery and restarted afterwards asap.      Electronically signed by Fay Powers MD at 10/23/2019  8:55 AM                                                                                                                10/31/2019  Tasha Hawley is a 64 y.o., female.    Pre-op Assessment    I have reviewed the Patient Summary Reports.     I have reviewed the Nursing Notes.   I have reviewed the Medications.     Review of Systems  Anesthesia Hx:  No problems with previous Anesthesia  History of prior surgery of interest to airway management or planning: Previous anesthesia: General 9/3/19 cysto with supra pubic bladder sling with general anesthesia.   Denies Personal Hx of Anesthesia complications.   Social:  Non-Smoker, No Alcohol Use    Hematology/Oncology:     Oncology Normal    -- Anemia: Hematology Comments: Hx iron deficiency anemia    EENT/Dental:EENT/Dental Normal   Cardiovascular:   Exercise tolerance: poor CAD asymptomatic   Denies Angina. JONES States runs low blood pressure Coronary Artery Disease:  patient problem list. Ischemic Cardiomyopathy  Cardiomyopathy, Ischemic Cardiomyopathy  Chronic Venous Insufficiency  Carotoid Artery Disease, bilateral    Pulmonary:   Denies Shortness of breath. Sleep Apnea Doesn't use c-pap   Hepatic/GI:   GERD, well controlled    Musculoskeletal:   Arthritis  DDD  Scoliosis  Hx back surgery  osteoarthritis   Neurological:   Neuromuscular Disease, Headaches Has a implanted pain pump-- dilaudid  Lumbar radiculopathy  DDD  Chronic Pain Syndrome   Endocrine:   Hypothyroidism    Dermatological:  Skin Normal    Psych:   anxiety depression GONZALO  Hx narcotic dependency per chart         Physical  Exam  General:  Well nourished    Airway/Jaw/Neck:  Airway Findings: Mouth Opening: Normal General Airway Assessment: Adult  Mallampati: II  Improves to II with phonation.  Jaw/Neck Findings:  Limited Ability to Prognath  Neck ROM: Normal ROM     Eyes/Ears/Nose:  Eyes/Ears/Nose Findings:    Dental:  Dental Findings: In tact   Chest/Lungs:  Chest/Lungs Findings: Clear to auscultation, Normal Respiratory Rate     Heart/Vascular:  Heart Findings: Rate: Normal  Rhythm: Regular Rhythm  Sounds: Normal     Abdomen:  Abdomen Findings:  Normal     Musculoskeletal:  Musculoskeletal Findings:    Skin:  Skin Findings:     Mental Status:  Mental Status Findings:  Cooperative, Alert and Oriented         Anesthesia Plan  Type of Anesthesia, risks & benefits discussed:  Anesthesia Type:  general, MAC  Patient's Preference:   Intra-op Monitoring Plan: standard ASA monitors  Intra-op Monitoring Plan Comments:   Post Op Pain Control Plan:   Post Op Pain Control Plan Comments:   Induction:   IV  Beta Blocker:  Patient is not currently on a Beta-Blocker (No further documentation required).       Informed Consent: Patient understands risks and agrees with Anesthesia plan.  Questions answered. Anesthesia consent signed with patient.  ASA Score: 3     Day of Surgery Review of History & Physical:    H&P update referred to the surgeon.         Ready For Surgery From Anesthesia Perspective.

## 2019-10-31 NOTE — DISCHARGE SUMMARY
[facility]  Discharge Note  Short Stay    Admit Date: 10/31/2019    Discharge Date and Time: 10/31/2019 1:42 PM      Attending Physician: Shireen Mayo MD     Discharge Provider: Chau Briones    Diagnoses:  Active Hospital Problems    Diagnosis  POA    *Neurogenic bladder [N31.9]  Yes     Chronic      Resolved Hospital Problems   No resolved problems to display.       Discharged Condition: good    Hospital Course: Patient was admitted for cystoscopy with Botox injections and replacement of suprapubic catheter and tolerated the procedure well with no complications. The patient was discharged home in good condition on the same day.       Final Diagnoses: Same as principal problem.    Disposition: Home or Self Care    Follow up/Patient Instructions:    Medications:  Reconciled Home Medications:   Current Discharge Medication List      START taking these medications    Details   ibuprofen (ADVIL,MOTRIN) 600 MG tablet Take 1 tablet (600 mg total) by mouth every 6 (six) hours as needed for Pain.  Qty: 30 tablet, Refills: 0         CONTINUE these medications which have NOT CHANGED    Details   ampicillin (PRINCIPEN) 500 MG capsule Take 1 capsule (500 mg total) by mouth 4 (four) times daily for 7 days  Qty: 28 capsule, Refills: 0    Associated Diagnoses: Bladder infection      aspirin (ECOTRIN) 81 MG EC tablet Take 1 tablet (81 mg total) by mouth once daily.  Refills: 0      atorvastatin (LIPITOR) 80 MG tablet TAKE 1 TABLET BY MOUTH DAILY  Qty: 90 tablet, Refills: 3      butalbital-acetaminophen-caffeine -40 mg (FIORICET, ESGIC) -40 mg per tablet       cholecalciferol, vitamin D3, (VITAMIN D3) 5,000 unit Tab Take 5,000 Units by mouth once daily.      docusate sodium (COLACE) 100 MG capsule Take 1 capsule (100 mg total) by mouth 3 (three) times daily as needed for constipation.  Qty: 180 capsule, Refills: 3    Associated Diagnoses: Constipation, unspecified constipation type      FLUoxetine 20  MG capsule Take 3 capsules (60 mg total) by mouth once daily.  Qty: 270 capsule, Refills: 3    Associated Diagnoses: Anxiety      furosemide (LASIX) 40 MG tablet Take 1 tablet (40 mg total) by mouth once daily.  Qty: 90 tablet, Refills: 3    Associated Diagnoses: Hypotension, unspecified hypotension type      !! HYDROcodone-acetaminophen (NORCO)  mg per tablet Take 1 tablet by mouth every 6 (six) hours as needed for Pain.  Qty: 15 tablet, Refills: 0      lidocaine (LIDODERM) 5 % APPLY 1 PATCH DAILY AND WEAR FOR A MAXIMUM OF 12 HOURS  Refills: 5      oxybutynin (DITROPAN XL) 15 MG TR24 Take 1 tablet (15 mg total) by mouth once daily.  Qty: 30 tablet, Refills: 11    Associated Diagnoses: Detrusor instability      pantoprazole (PROTONIX) 40 MG tablet Take 1 tablet (40 mg total) by mouth once daily.  Qty: 90 tablet, Refills: 3    Associated Diagnoses: Esophageal dysphagia      promethazine (PHENERGAN) 25 MG tablet Take 25 mg by mouth every 6 (six) hours as needed for Nausea.      QUEtiapine (SEROQUEL) 100 MG Tab TAKE 1 TABLET (100 MG TOTAL) BY MOUTH EVERY EVENING.  Qty: 90 tablet, Refills: 1      SENNOSIDES (SENNA LAX ORAL) Take 20 mg by mouth every evening.       traZODone (DESYREL) 100 MG tablet Take 3 tablets (300 mg total) by mouth every evening.  Qty: 270 tablet, Refills: 1    Associated Diagnoses: Insomnia, unspecified type      !! hydrocodone-acetaminophen 10-325mg (NORCO)  mg Tab Take 2 tablets by mouth every 4 (four) hours as needed for Pain.       levothyroxine (SYNTHROID) 125 MCG tablet Take 1 tablet (125 mcg total) by mouth before breakfast.  Qty: 90 tablet, Refills: 3    Associated Diagnoses: Primary hypothyroidism      potassium citrate (UROCIT-K) 10 mEq (1,080 mg) TbSR Take 1 tablet (10 mEq total) by mouth 3 (three) times daily with meals.  Qty: 90 tablet, Refills: 11    Associated Diagnoses: Recurrent UTI       !! - Potential duplicate medications found. Please discuss with provider.       STOP taking these medications       lamotrigine (LAMICTAL) 25 MG tablet Comments:   Reason for Stopping:             Discharge Procedure Orders   Diet general     Call MD for:  extreme fatigue     Call MD for:  persistent dizziness or light-headedness     Call MD for:  hives     Call MD for:  redness, tenderness, or signs of infection (pain, swelling, redness, odor or green/yellow discharge around incision site)     Call MD for:  difficulty breathing, headache or visual disturbances     Call MD for:  severe uncontrolled pain     Call MD for:  persistent nausea and vomiting     Call MD for:  temperature >100.4     Follow-up Information     Shireen Mayo MD On 11/8/2019.    Specialty:  Urology  Contact information:  [address]  [place] [id number] [phone]                 As above.

## 2019-10-31 NOTE — PROGRESS NOTES
Spoke with Dr. Briones and updated, pt dressed and waiting to leave. Would like to speak with Dr. Mayo prior to DC.  remains at bedside.

## 2019-11-01 LAB — BACTERIA UR CULT: ABNORMAL

## 2019-11-01 RX ORDER — CIPROFLOXACIN 500 MG/1
500 TABLET ORAL EVERY 12 HOURS
Qty: 6 TABLET | Refills: 0 | Status: SHIPPED | OUTPATIENT
Start: 2019-11-01 | End: 2019-11-04

## 2019-11-05 ENCOUNTER — PATIENT OUTREACH (OUTPATIENT)
Dept: ADMINISTRATIVE | Facility: HOSPITAL | Age: 64
End: 2019-11-05

## 2019-11-07 ENCOUNTER — OFFICE VISIT (OUTPATIENT)
Dept: INTERNAL MEDICINE | Facility: CLINIC | Age: 64
End: 2019-11-07
Payer: MEDICARE

## 2019-11-07 VITALS
WEIGHT: 178.56 LBS | OXYGEN SATURATION: 98 % | SYSTOLIC BLOOD PRESSURE: 110 MMHG | HEART RATE: 56 BPM | DIASTOLIC BLOOD PRESSURE: 60 MMHG | HEIGHT: 64 IN | BODY MASS INDEX: 30.48 KG/M2

## 2019-11-07 DIAGNOSIS — M46.96 INFLAMMATORY SPONDYLOPATHY OF LUMBAR REGION: ICD-10-CM

## 2019-11-07 DIAGNOSIS — R13.19 ESOPHAGEAL DYSPHAGIA: ICD-10-CM

## 2019-11-07 DIAGNOSIS — E03.9 PRIMARY HYPOTHYROIDISM: Chronic | ICD-10-CM

## 2019-11-07 DIAGNOSIS — N32.81 DETRUSOR INSTABILITY: ICD-10-CM

## 2019-11-07 DIAGNOSIS — R60.9 EDEMA, UNSPECIFIED TYPE: ICD-10-CM

## 2019-11-07 DIAGNOSIS — F41.1 GENERALIZED ANXIETY DISORDER: ICD-10-CM

## 2019-11-07 DIAGNOSIS — K59.00 CONSTIPATION, UNSPECIFIED CONSTIPATION TYPE: ICD-10-CM

## 2019-11-07 DIAGNOSIS — G47.00 INSOMNIA, UNSPECIFIED TYPE: Primary | ICD-10-CM

## 2019-11-07 DIAGNOSIS — E78.2 MIXED HYPERLIPIDEMIA: ICD-10-CM

## 2019-11-07 PROBLEM — Z01.810 PREOP CARDIOVASCULAR EXAM: Status: RESOLVED | Noted: 2019-05-23 | Resolved: 2019-11-07

## 2019-11-07 PROBLEM — N39.0 URINARY TRACT INFECTION: Status: RESOLVED | Noted: 2018-12-28 | Resolved: 2019-11-07

## 2019-11-07 PROBLEM — I95.9 HYPOTENSION: Status: RESOLVED | Noted: 2019-02-22 | Resolved: 2019-11-07

## 2019-11-07 PROCEDURE — G0008 FLU VACCINE (QUAD) GREATER THAN OR EQUAL TO 3YO PRESERVATIVE FREE IM: ICD-10-PCS | Mod: HCNC,S$GLB,, | Performed by: INTERNAL MEDICINE

## 2019-11-07 PROCEDURE — 3008F BODY MASS INDEX DOCD: CPT | Mod: HCNC,CPTII,S$GLB, | Performed by: INTERNAL MEDICINE

## 2019-11-07 PROCEDURE — G0008 ADMIN INFLUENZA VIRUS VAC: HCPCS | Mod: HCNC,S$GLB,, | Performed by: INTERNAL MEDICINE

## 2019-11-07 PROCEDURE — 99215 OFFICE O/P EST HI 40 MIN: CPT | Mod: HCNC,25,S$GLB, | Performed by: INTERNAL MEDICINE

## 2019-11-07 PROCEDURE — 99999 PR PBB SHADOW E&M-EST. PATIENT-LVL III: ICD-10-PCS | Mod: PBBFAC,HCNC,, | Performed by: INTERNAL MEDICINE

## 2019-11-07 PROCEDURE — 3008F PR BODY MASS INDEX (BMI) DOCUMENTED: ICD-10-PCS | Mod: HCNC,CPTII,S$GLB, | Performed by: INTERNAL MEDICINE

## 2019-11-07 PROCEDURE — 99999 PR PBB SHADOW E&M-EST. PATIENT-LVL III: CPT | Mod: PBBFAC,HCNC,, | Performed by: INTERNAL MEDICINE

## 2019-11-07 PROCEDURE — 90686 IIV4 VACC NO PRSV 0.5 ML IM: CPT | Mod: HCNC,S$GLB,, | Performed by: INTERNAL MEDICINE

## 2019-11-07 PROCEDURE — 99215 PR OFFICE/OUTPT VISIT, EST, LEVL V, 40-54 MIN: ICD-10-PCS | Mod: HCNC,25,S$GLB, | Performed by: INTERNAL MEDICINE

## 2019-11-07 PROCEDURE — 90686 FLU VACCINE (QUAD) GREATER THAN OR EQUAL TO 3YO PRESERVATIVE FREE IM: ICD-10-PCS | Mod: HCNC,S$GLB,, | Performed by: INTERNAL MEDICINE

## 2019-11-07 RX ORDER — DOCUSATE SODIUM 100 MG/1
100 CAPSULE, LIQUID FILLED ORAL 3 TIMES DAILY PRN
Qty: 180 CAPSULE | Refills: 3 | Status: ON HOLD | OUTPATIENT
Start: 2019-11-07 | End: 2020-01-31 | Stop reason: HOSPADM

## 2019-11-07 RX ORDER — ATORVASTATIN CALCIUM 80 MG/1
TABLET, FILM COATED ORAL
Qty: 90 TABLET | Refills: 3 | Status: SHIPPED | OUTPATIENT
Start: 2019-11-07 | End: 2020-05-28 | Stop reason: SDUPTHER

## 2019-11-07 RX ORDER — PANTOPRAZOLE SODIUM 40 MG/1
40 TABLET, DELAYED RELEASE ORAL DAILY
Qty: 90 TABLET | Refills: 3 | Status: SHIPPED | OUTPATIENT
Start: 2019-11-07 | End: 2020-05-28 | Stop reason: SDUPTHER

## 2019-11-07 RX ORDER — OXYBUTYNIN CHLORIDE 15 MG/1
15 TABLET, EXTENDED RELEASE ORAL DAILY
Qty: 30 TABLET | Refills: 11 | Status: SHIPPED | OUTPATIENT
Start: 2019-11-07 | End: 2019-12-02 | Stop reason: ALTCHOICE

## 2019-11-07 RX ORDER — LEVOTHYROXINE SODIUM 125 UG/1
125 TABLET ORAL
Qty: 90 TABLET | Refills: 3 | Status: SHIPPED | OUTPATIENT
Start: 2019-11-07 | End: 2020-05-28 | Stop reason: SDUPTHER

## 2019-11-07 RX ORDER — FUROSEMIDE 40 MG/1
40 TABLET ORAL DAILY
Qty: 90 TABLET | Refills: 3 | Status: ON HOLD | OUTPATIENT
Start: 2019-11-07 | End: 2020-01-31 | Stop reason: SDUPTHER

## 2019-11-07 NOTE — PROGRESS NOTES
Subjective:       Patient ID: Tasha Hawley is a 64 y.o. female.    Chief Complaint: Follow-up    HPI - Ms Hawley feels poorly today.  She says she hasn't slept in 3 nights despite 100 of seroquel and 300 of trazodone at night.  This is long-standing and cyclical.  She will shortly have a couple of days when she sleeps all day and then her sleep-wake cycle is reset.  She needs refills on 'all' meds to a new pharmacy.  She's just had surgery through urogyn and got through that OK; however, it was stressful for her.  She thinks this is why she's not sleeping.  Constipation is OK right now.  Back pain is acting up.  On chronic narcotics from an outside pain clinic.  She's due for flu shot.  Not a smoker.  Chronic LE edema is worse today; tends to worsen when she's not sleeping well    PMH:  Chronic insomnia, back in a bad pattern again.  Chronic low back pain with narcotic use.  Indwelling narcotic pump  History CVA with dysarthria  Neurogenic bladder, with recurrent UTI's. Followed by urogyn ([facility])  Hypothyroidism, stable  CECI, not on CPAP  CAD, with ACS in Jan 2016 - subtot mid LAD lesion without PCI; ischemic CM with EF 40% and chronic LE edema. Followed by [facility]  Anxiety and Depression  Chronic constipation, likely d/t ongoing narcotic use  Intermittent nausea  LE dependent edema     Meds: Reviewed and reconciled in EPIC with patient during visit today.    Review of Systems   Constitutional: Positive for fatigue. Negative for fever.   HENT: Negative for congestion.    Respiratory: Negative for shortness of breath.    Cardiovascular: Positive for leg swelling.   Gastrointestinal: Negative for abdominal pain.   Genitourinary: Negative for difficulty urinating.   Musculoskeletal: Positive for arthralgias.   Skin: Negative for rash.   Neurological: Positive for headaches.   Psychiatric/Behavioral: Positive for sleep disturbance.       Objective:      Physical Exam   Constitutional: She is  oriented to person, place, and time. She appears well-developed and well-nourished.   Dysthymic woman examined in w/c.  Falling asleep during interview and exam.   present for interview/exam   HENT:   Head: Normocephalic and atraumatic.   Cardiovascular: Normal rate, regular rhythm and normal heart sounds. Exam reveals no gallop and no friction rub.   No murmur heard.  Pulmonary/Chest: Effort normal and breath sounds normal. No respiratory distress. She has no wheezes. She has no rales. She exhibits no tenderness.   Neurological: She is alert and oriented to person, place, and time.   Skin: Skin is warm and dry. No erythema.   Psychiatric: She has a normal mood and affect.   Nursing note and vitals reviewed.      Assessment:       1. Insomnia, unspecified type    2. Generalized anxiety disorder    3. Primary hypothyroidism    4. Esophageal dysphagia    5. Constipation, unspecified constipation type    6. Detrusor instability    7. Inflammatory spondylopathy of lumbar region    8. Edema, unspecified type    9. Mixed hyperlipidemia        Plan:       Tasha was seen today for follow-up.    Diagnoses and all orders for this visit:    Insomnia, unspecified type - discussed sleep hygiene, medication and use of CBT, which she has done before.  Continue present care    Generalized anxiety disorder    Primary hypothyroidism - stable, refilling medication  -     levothyroxine (SYNTHROID) 125 MCG tablet; Take 1 tablet (125 mcg total) by mouth before breakfast.    Esophageal dysphagia - stable, refilling medication  -     pantoprazole (PROTONIX) 40 MG tablet; Take 1 tablet (40 mg total) by mouth once daily.    Constipation, unspecified constipation type - adequately treated.  Refilling medication  -     docusate sodium (COLACE) 100 MG capsule; Take 1 capsule (100 mg total) by mouth 3 (three) times daily as needed for constipation.    Detrusor instability  -     oxybutynin (DITROPAN XL) 15 MG TR24; Take 1 tablet (15 mg  total) by mouth once daily.    Inflammatory spondylopathy of lumbar region    Edema, unspecified type - worsening.  Recommended tighter stockings and elevating feet.  Refilling lasix  -     furosemide (LASIX) 40 MG tablet; Take 1 tablet (40 mg total) by mouth once daily.    Mixed hyperlipidemia - stable, refilling atorvastatin  -     atorvastatin (LIPITOR) 80 MG tablet; TAKE 1 TABLET BY MOUTH DAILY    rtc prn, or in 3 months    PAPO Hodges MD MPH  Staff Internist

## 2019-11-14 ENCOUNTER — TELEPHONE (OUTPATIENT)
Dept: INTERNAL MEDICINE | Facility: CLINIC | Age: 64
End: 2019-11-14

## 2019-11-14 NOTE — TELEPHONE ENCOUNTER
----- Message from Ann Eng sent at 11/14/2019  4:11 PM CST -----  Contact: Liz/[facility]/[phone]  Pt gained 8 pounds in a month. Also having some swelling in kegs and nurse wants to know if she can up the dosage for furosemide (LASIX) 40 MG tablet. Pt is currently taking 80 mg a day but directions state take 40 mg. Please advise.        Thank You

## 2019-11-15 ENCOUNTER — EXTERNAL HOME HEALTH (OUTPATIENT)
Dept: HOME HEALTH SERVICES | Facility: HOSPITAL | Age: 64
End: 2019-11-15
Payer: MEDICARE

## 2019-11-15 NOTE — TELEPHONE ENCOUNTER
Please call Liz.  Ms Hawley should keep her legs elevated and use compression stockings.  She should take an extra 40mg of lasix if she gains more than 5 lbs in a day.    Thanks.

## 2019-11-15 NOTE — TELEPHONE ENCOUNTER
Called Liz with home health and gave instructions on Mrs Chandan miltonix orders. Extra 40 mg if patient gains 5 lbs a day, elevate legs, and use compression stockings    Carrie.

## 2019-12-02 ENCOUNTER — OFFICE VISIT (OUTPATIENT)
Dept: UROLOGY | Facility: CLINIC | Age: 64
End: 2019-12-02
Payer: MEDICARE

## 2019-12-02 VITALS
BODY MASS INDEX: 29.02 KG/M2 | SYSTOLIC BLOOD PRESSURE: 81 MMHG | HEART RATE: 51 BPM | HEIGHT: 64 IN | WEIGHT: 170 LBS | DIASTOLIC BLOOD PRESSURE: 43 MMHG

## 2019-12-02 DIAGNOSIS — R33.9 URINARY RETENTION: Chronic | ICD-10-CM

## 2019-12-02 DIAGNOSIS — R10.819 SUPRAPUBIC TENDERNESS: ICD-10-CM

## 2019-12-02 DIAGNOSIS — N31.9 NEUROGENIC BLADDER: Primary | Chronic | ICD-10-CM

## 2019-12-02 DIAGNOSIS — Z93.59 SUPRAPUBIC CATHETER: Chronic | ICD-10-CM

## 2019-12-02 PROBLEM — R32 URINARY INCONTINENCE: Status: RESOLVED | Noted: 2019-10-29 | Resolved: 2019-12-02

## 2019-12-02 PROCEDURE — 99024 PR POST-OP FOLLOW-UP VISIT: ICD-10-PCS | Mod: HCNC,S$GLB,, | Performed by: UROLOGY

## 2019-12-02 PROCEDURE — 99999 PR PBB SHADOW E&M-EST. PATIENT-LVL V: ICD-10-PCS | Mod: PBBFAC,HCNC,, | Performed by: UROLOGY

## 2019-12-02 PROCEDURE — 3008F BODY MASS INDEX DOCD: CPT | Mod: HCNC,CPTII,S$GLB, | Performed by: UROLOGY

## 2019-12-02 PROCEDURE — 87086 URINE CULTURE/COLONY COUNT: CPT | Mod: HCNC

## 2019-12-02 PROCEDURE — 87186 SC STD MICRODIL/AGAR DIL: CPT | Mod: HCNC

## 2019-12-02 PROCEDURE — 87077 CULTURE AEROBIC IDENTIFY: CPT | Mod: HCNC

## 2019-12-02 PROCEDURE — 3008F PR BODY MASS INDEX (BMI) DOCUMENTED: ICD-10-PCS | Mod: HCNC,CPTII,S$GLB, | Performed by: UROLOGY

## 2019-12-02 PROCEDURE — 87088 URINE BACTERIA CULTURE: CPT | Mod: HCNC

## 2019-12-02 PROCEDURE — 99999 PR PBB SHADOW E&M-EST. PATIENT-LVL V: CPT | Mod: PBBFAC,HCNC,, | Performed by: UROLOGY

## 2019-12-02 PROCEDURE — 99024 POSTOP FOLLOW-UP VISIT: CPT | Mod: HCNC,S$GLB,, | Performed by: UROLOGY

## 2019-12-02 RX ORDER — AMPICILLIN 500 MG/1
500 CAPSULE ORAL EVERY 6 HOURS
Qty: 28 CAPSULE | Refills: 0 | Status: SHIPPED | OUTPATIENT
Start: 2019-12-02 | End: 2019-12-10

## 2019-12-02 NOTE — PROGRESS NOTES
CHIEF COMPLAINT:    Mrs. Hawley is a 64 y.o. female presenting for a follow up status post obstructing autologous sling 9/3/2019 and status post cystoscopy Botox injection of Bladder on 10/31/2019    PRESENTING ILLNESS:    Tasha Hawley is a 64 y.o. female who returns for follow up.  She states that she is dry.  No longer wearing incontinence products, she is now wearing normal underwear.  She is very pleased with the response.  However, she has sediment in the bag and feels like she has a UTI, there is some suprapubic tenderness and vaginal heaviness.  No fevers or chills.  The catheter was just changed last week and she does not wish to have it changed today.      REVIEW OF SYSTEMS:    Review of Systems   Constitutional: Negative.    HENT: Negative.    Eyes: Negative.    Respiratory: Negative.    Cardiovascular: Positive for leg swelling.   Gastrointestinal: Positive for abdominal pain.   Genitourinary:        Vaginal heaviness   Musculoskeletal: Positive for back pain and myalgias.   Skin: Negative.    Neurological: Negative.    Endo/Heme/Allergies: Negative.    Psychiatric/Behavioral: Negative.        PATIENT HISTORY:    Past Medical History:   Diagnosis Date    Anticoagulant long-term use     Anxiety     Arthritis     Bilateral lower extremity edema     severe chronic    Carotid artery occlusion     Cataract     Coronary artery disease     subtotalled LAD with collateral    Depression     Fever blister     Hypothyroid     Iron deficiency anemia     Lumbar radiculopathy     with chronic pain    Ocular migraine     Sleep apnea     cpap       Past Surgical History:   Procedure Laterality Date    ADENOIDECTOMY      BACK SURGERY      x 12    CARDIAC CATHETERIZATION  2016    subtotalled LAD with right to left collaterals    CATARACT EXTRACTION W/  INTRAOCULAR LENS IMPLANT Left     Dr Coleman     CYSTOSCOPIC LITHOLAPAXY N/A 6/27/2019    Procedure: CYSTOLITHOLAPAXY;  Surgeon: Shireen PICKETT  MD Maria Esther;  Location: [facility] OR 2ND FLR;  Service: Urology;  Laterality: N/A;    CYSTOSCOPIC LITHOLAPAXY N/A 9/3/2019    Procedure: CYSTOLITHOLAPAXY;  Surgeon: Shireen Mayo MD;  Location: [facility] OR 2ND FLR;  Service: Urology;  Laterality: N/A;    ESOPHAGOGASTRODUODENOSCOPY N/A 5/23/2018    Procedure: ESOPHAGOGASTRODUODENOSCOPY (EGD);  Surgeon: Prince Vance MD;  Location: [facility] (4TH FLR);  Service: Endoscopy;  Laterality: N/A;  r/s 'd per Dr. Vance due to family emergency- ER    HYSTERECTOMY  1975    endometriosis    pain pump placement      SQ Dilaudid Pump managed by Dr. Hillman, Pain Management    REPLACEMENT OF CATHETER N/A 10/31/2019    Procedure: REPLACEMENT, CATHETER-SUPRAPUBIC;  Surgeon: Shireen Mayo MD;  Location: [facility] OR 1ST FLR;  Service: Urology;  Laterality: N/A;    SPINAL CORD STIMULATOR REMOVAL      before [facility]    SPINE SURGERY  5-13-13    CERVICAL FUSION    TONSILLECTOMY         Family History   Problem Relation Age of Onset    Cancer Mother 55        breast    Cancer Father         esophagus,had laryngectomy    Esophageal cancer Father     Parkinsonism Maternal Grandmother     Tremor Maternal Grandmother     Heart disease Maternal Uncle      Socioeconomic History    Marital status:    Tobacco Use    Smoking status: Never Smoker    Smokeless tobacco: Never Used   Substance and Sexual Activity    Alcohol use: Never     Frequency: Never    Drug use: No    Sexual activity: Never       Allergies:  (d)-limonene flavor; Bactrim [sulfamethoxazole-trimethoprim]; Benadryl [diphenhydramine hcl]; Imitrex [sumatriptan succinate]; Topamax [topiramate]; Vancomycin; Butorphanol tartrate; Darvocet a500 [propoxyphene n-acetaminophen]; Fentanyl; White petrolatum-zinc; Zinc oxide-white petrolatum; Latex, natural rubber; and Phenytoin    Medications:  Outpatient Encounter Medications as of 12/2/2019   Medication Sig Dispense Refill    atorvastatin (LIPITOR) 80 MG tablet  TAKE 1 TABLET BY MOUTH DAILY 90 tablet 3    butalbital-acetaminophen-caffeine -40 mg (FIORICET, ESGIC) -40 mg per tablet       cholecalciferol, vitamin D3, (VITAMIN D3) 5,000 unit Tab Take 5,000 Units by mouth once daily.      docusate sodium (COLACE) 100 MG capsule Take 1 capsule (100 mg total) by mouth 3 (three) times daily as needed for constipation. 180 capsule 3    FLUoxetine 20 MG capsule Take 3 capsules (60 mg total) by mouth once daily. 270 capsule 3    furosemide (LASIX) 40 MG tablet Take 1 tablet (40 mg total) by mouth once daily. 90 tablet 3    HYDROcodone-acetaminophen (NORCO)  mg per tablet Take 1 tablet by mouth every 6 (six) hours as needed for Pain. 15 tablet 0    levothyroxine (SYNTHROID) 125 MCG tablet Take 1 tablet (125 mcg total) by mouth before breakfast. 90 tablet 3    lidocaine (LIDODERM) 5 % APPLY 1 PATCH DAILY AND WEAR FOR A MAXIMUM OF 12 HOURS  5    pantoprazole (PROTONIX) 40 MG tablet Take 1 tablet (40 mg total) by mouth once daily. 90 tablet 3    promethazine (PHENERGAN) 25 MG tablet Take 25 mg by mouth every 6 (six) hours as needed for Nausea.      QUEtiapine (SEROQUEL) 100 MG Tab TAKE 1 TABLET (100 MG TOTAL) BY MOUTH EVERY EVENING. 90 tablet 1    SENNOSIDES (SENNA LAX ORAL) Take 20 mg by mouth every evening.       traZODone (DESYREL) 100 MG tablet Take 3 tablets (300 mg total) by mouth every evening. 270 tablet 1    [DISCONTINUED] oxybutynin (DITROPAN XL) 15 MG TR24 Take 1 tablet (15 mg total) by mouth once daily. 30 tablet 11    ampicillin (PRINCIPEN) 500 MG capsule Take 1 capsule (500 mg total) by mouth every 6 (six) hours. 28 capsule 0    aspirin (ECOTRIN) 81 MG EC tablet Take 1 tablet (81 mg total) by mouth once daily.  0    potassium citrate (UROCIT-K) 10 mEq (1,080 mg) TbSR Take 1 tablet (10 mEq total) by mouth 3 (three) times daily with meals. (Patient taking differently: Take 20 mEq by mouth as needed. ) 90 tablet 11    [DISCONTINUED]  lamotrigine (LAMICTAL) 25 MG tablet Take 1 tablet (25 mg total) by mouth once daily. 30 tablet 6     No facility-administered encounter medications on file as of 12/2/2019.          PHYSICAL EXAMINATION:    The patient generally appears in good health, is appropriately interactive, and is in no apparent distress.    Skin: No lesions.    Mental: Cooperative with normal affect.    Neuro: Grossly intact.    HEENT: Normal. No evidence of lymphadenopathy.    Chest:  normal inspiratory effort.    Abdomen:  Soft, non-tender. No masses or organomegaly. Bladder is not palpable. No evidence of flank discomfort. No evidence of inguinal hernia.    Extremities: No clubbing, cyanosis, or edema     tubing has sediment present.      LABS:    Lab Results   Component Value Date    BUN 7 (L) 09/04/2019    CREATININE 0.7 09/04/2019         IMPRESSION:    Encounter Diagnoses   Name Primary?    Neurogenic bladder Yes    Suprapubic catheter     Urinary retention     Suprapubic tenderness        PLAN:    1.  Urine from the catheter sent for culture  2.  Ampicillin sent empirically to her preferred pharmacy  3.  Recommended Vitamin C and D mannose to prevent UTI  4.  Follow up in 3 months for exam.   5.  Home health to continue suprapubic catheter changes.

## 2019-12-06 ENCOUNTER — TELEPHONE (OUTPATIENT)
Dept: UROLOGY | Facility: CLINIC | Age: 64
End: 2019-12-06

## 2019-12-06 LAB — BACTERIA UR CULT: ABNORMAL

## 2019-12-06 NOTE — TELEPHONE ENCOUNTER
----- Message from Rita Persaud sent at 12/6/2019  8:49 AM CST -----  Pt called stated she have a really bad UTI. She said, the antibiotics is not helping. She still have a lot of pressure, burning and foul odor.  Call back # [phone]

## 2019-12-09 ENCOUNTER — TELEPHONE (OUTPATIENT)
Dept: UROLOGY | Facility: CLINIC | Age: 64
End: 2019-12-09

## 2019-12-09 DIAGNOSIS — B37.31 YEAST VAGINITIS: ICD-10-CM

## 2019-12-09 DIAGNOSIS — N30.90 BLADDER INFECTION: Primary | ICD-10-CM

## 2019-12-09 NOTE — TELEPHONE ENCOUNTER
----- Message from Sis Calixto sent at 12/9/2019  4:48 PM CST -----  Contact: pt: [phone]  Pt state the antibiotics aren't working she still has burning and bladder pressure, state she just hade her SP changed today, would like to speak with Dr. Mayo     Please contact pt: [phone]

## 2019-12-10 RX ORDER — CEPHALEXIN 500 MG/1
500 CAPSULE ORAL EVERY 8 HOURS
Qty: 21 CAPSULE | Refills: 0 | Status: SHIPPED | OUTPATIENT
Start: 2019-12-10 | End: 2019-12-17

## 2019-12-10 NOTE — TELEPHONE ENCOUNTER
Cephalexin sent in as she is allergic to Bactrim DS.  The organism is resistant to fluoroquinolones.

## 2019-12-11 NOTE — TELEPHONE ENCOUNTER
----- Message from Elida Cummings sent at 12/11/2019  9:15 AM CST -----  Contact: pt#[phone]  Pt states that she has a yeast infection on top of Uti and she wants to speak with nurse regarding it. Please call

## 2019-12-12 DIAGNOSIS — N39.0 RECURRENT UTI: ICD-10-CM

## 2019-12-12 DIAGNOSIS — K59.00 CONSTIPATION, UNSPECIFIED CONSTIPATION TYPE: ICD-10-CM

## 2019-12-12 RX ORDER — POTASSIUM CITRATE 10 MEQ/1
20 TABLET, EXTENDED RELEASE ORAL
Qty: 90 TABLET | Refills: 3 | Status: ON HOLD | OUTPATIENT
Start: 2019-12-12 | End: 2020-01-31 | Stop reason: SDUPTHER

## 2019-12-13 RX ORDER — FLUCONAZOLE 150 MG/1
150 TABLET ORAL ONCE
Qty: 2 TABLET | Refills: 0 | Status: SHIPPED | OUTPATIENT
Start: 2019-12-13 | End: 2019-12-13

## 2019-12-16 ENCOUNTER — TELEPHONE (OUTPATIENT)
Dept: CARDIOLOGY | Facility: CLINIC | Age: 64
End: 2019-12-16

## 2019-12-16 NOTE — TELEPHONE ENCOUNTER
Saray with [facility] calling to report patient 9 lb wt gain in last month with pitting edema bilat, no increased shortness of breath, lungs clear. Wt today 174.6lb. Last week 169.8lb on the 9th. 2 weeks prior 165.3lb. Patient currently taking 80mg Lasix daily. Patient self increased to 80mg BID Friday and Saturday. /60. Message routed to Dr Arrington for review.     [phone].

## 2019-12-17 ENCOUNTER — TELEPHONE (OUTPATIENT)
Dept: INTERNAL MEDICINE | Facility: CLINIC | Age: 64
End: 2019-12-17

## 2019-12-17 NOTE — TELEPHONE ENCOUNTER
----- Message from Willy Gates sent at 12/17/2019 11:38 AM CST -----  Contact: [phone]  Pharmacy is calling to discuss changing medication potassium citrate (UROCIT-K) 10 mEq (1,080 mg) TbSR . Please call and advise, Thanks

## 2019-12-17 NOTE — TELEPHONE ENCOUNTER
Prescription for potassium has been changed with the pharmacist for a cheaper brand potassium chloride 10 meq 2 tabs prn #90 3 refills.    Carrie.

## 2019-12-23 ENCOUNTER — TELEPHONE (OUTPATIENT)
Dept: CARDIOLOGY | Facility: CLINIC | Age: 64
End: 2019-12-23

## 2019-12-23 NOTE — TELEPHONE ENCOUNTER
Saray with [facility] calling patient wt today 178.2, 12/19 173.2lb, with 3+ LE edema and puffiness in hand, states has been continuing Lasix 80mg BID since last week.

## 2019-12-23 NOTE — TELEPHONE ENCOUNTER
Advised as per consulting MD patient to go to ER for eval. Per HH nurse patient refuses. Visit in clinic made as per availability tomorrow. Advised if any new or worsening symptoms to go to ER.

## 2019-12-27 ENCOUNTER — TELEPHONE (OUTPATIENT)
Dept: CARDIOLOGY | Facility: CLINIC | Age: 64
End: 2019-12-27

## 2019-12-27 PROCEDURE — G0180 MD CERTIFICATION HHA PATIENT: HCPCS | Mod: ,,, | Performed by: UROLOGY

## 2019-12-27 PROCEDURE — G0180 PR HOME HEALTH MD CERTIFICATION: ICD-10-PCS | Mod: ,,, | Performed by: UROLOGY

## 2019-12-27 NOTE — TELEPHONE ENCOUNTER
----- Message from Sharmila Delarosa sent at 12/27/2019  2:44 PM CST -----  Contact: pt [phone]  Pt says she keep retaining fluid and she gain at least 10 pounds weekly. She may gain and then lose some. She says she's today her feet, arms, hands, and legs are swollen and her legs hurt.  LOV 5/2319 Dr. WILNER Powers    Thanks

## 2020-01-03 ENCOUNTER — TELEPHONE (OUTPATIENT)
Dept: UROLOGY | Facility: CLINIC | Age: 65
End: 2020-01-03

## 2020-01-03 DIAGNOSIS — N30.90 BLADDER INFECTION: Primary | ICD-10-CM

## 2020-01-03 RX ORDER — AMOXICILLIN AND CLAVULANATE POTASSIUM 500; 125 MG/1; MG/1
1 TABLET, FILM COATED ORAL 3 TIMES DAILY
Qty: 21 TABLET | Refills: 0 | Status: SHIPPED | OUTPATIENT
Start: 2020-01-03 | End: 2020-01-10

## 2020-01-03 NOTE — TELEPHONE ENCOUNTER
Patient had a urine collected by home health because she had bladder pressure, pelvic pain and malodorous urine that was cloudy.      Proteus mirabilis, Resistant to cipro, levofloxacin, nitrofurantoin and intermediate to imipenem.  It is sensitive to augmentin which was sent to [facility] in [place].      Urine was collected on 12/30/2019, hard copy sent to the scanner.

## 2020-01-06 ENCOUNTER — EXTERNAL HOME HEALTH (OUTPATIENT)
Dept: HOME HEALTH SERVICES | Facility: HOSPITAL | Age: 65
End: 2020-01-06
Payer: MEDICARE

## 2020-01-08 ENCOUNTER — TELEPHONE (OUTPATIENT)
Dept: UROLOGY | Facility: CLINIC | Age: 65
End: 2020-01-08

## 2020-01-08 NOTE — TELEPHONE ENCOUNTER
----- Message from Merly Priest sent at 1/8/2020  8:14 AM CST -----  Contact: pt   Pt  Says antiobtic is not working.  Still having the same problem.  Please calling to speak with Christi.

## 2020-01-09 ENCOUNTER — TELEPHONE (OUTPATIENT)
Dept: UROLOGY | Facility: CLINIC | Age: 65
End: 2020-01-09

## 2020-01-09 NOTE — TELEPHONE ENCOUNTER
----- Message from Rita Persaud sent at 1/9/2020 11:42 AM CST -----  Contact: Celina mike/ [facility]  Celina mike/ [facility] called stated pt is still having symptoms of UTI after being treated with antibiotics. Call back # [phone]

## 2020-01-09 NOTE — TELEPHONE ENCOUNTER
Will have her wait until Monday and home health can change the cath, get a new culture.  In the meantime, take AZO Standard as monurol is not covered and we don't have a bacterial target.

## 2020-01-10 NOTE — TELEPHONE ENCOUNTER
Returned call to juan Patrick v/o, notified of Dr. Mayo's advice to change sptube and collect urine specimen from new catheter for c&s on Monday, 1/13/2020.

## 2020-01-13 ENCOUNTER — TELEPHONE (OUTPATIENT)
Dept: UROLOGY | Facility: CLINIC | Age: 65
End: 2020-01-13

## 2020-01-13 PROBLEM — I50.32 CHRONIC DIASTOLIC HEART FAILURE: Status: ACTIVE | Noted: 2020-01-13

## 2020-01-13 PROBLEM — E78.00 PURE HYPERCHOLESTEROLEMIA: Status: ACTIVE | Noted: 2020-01-13

## 2020-01-13 NOTE — TELEPHONE ENCOUNTER
----- Message from Bryanna Crowder sent at 1/13/2020  9:54 AM CST -----  Contact: Saray mike/[facility]#[phone]  Calling to clarify catheter care instructions on Friday appointment. Please advise

## 2020-01-13 NOTE — TELEPHONE ENCOUNTER
Returned call to Celina ([facility]  nurse), who states pt refused suprapubic catheter change for culture, stating she received another abx. Informed nurse that Dr. Mayo did not send another abx for pt, as pt was instructed to use AZO standard (OTC product) for urinary discomfort. Pt still c/o bladder pressure. Pt on her way to an appointment but she will try again tomorrow (1/14/2020) to collect urine specimen.

## 2020-01-14 ENCOUNTER — TELEPHONE (OUTPATIENT)
Dept: CARDIOLOGY | Facility: CLINIC | Age: 65
End: 2020-01-14

## 2020-01-14 NOTE — TELEPHONE ENCOUNTER
----- Message from Kary Lee sent at 1/14/2020  9:42 AM CST -----  Contact: Saray from [place] [employer] [facility]  Saray states the pt has gained 9 pounds in 5 days and up 15 pounds from her normal. Lv 5/23/19 Dr. WILNER Powers. Please call Saray @ [phone]. Thank you.

## 2020-01-14 NOTE — TELEPHONE ENCOUNTER
Patient missed appt yesterday in clinic. Per Saray, pt took Lasix 80mg BID for last three days. Wt today 187.4lb, last week 178.2lb. Baseline wt was close to 170lb per Saray. Per nurse states patient denies inceased shortness of breath or chest discomfort. Positive LE edema +3 bilat. Message routed to Dr. Priya cedillo. Appt made for tomorrow as per available with fellow.

## 2020-01-15 ENCOUNTER — OFFICE VISIT (OUTPATIENT)
Dept: CARDIOLOGY | Facility: CLINIC | Age: 65
End: 2020-01-15
Payer: MEDICARE

## 2020-01-15 ENCOUNTER — TELEPHONE (OUTPATIENT)
Dept: UROLOGY | Facility: CLINIC | Age: 65
End: 2020-01-15

## 2020-01-15 VITALS
WEIGHT: 190.94 LBS | DIASTOLIC BLOOD PRESSURE: 46 MMHG | BODY MASS INDEX: 32.6 KG/M2 | HEIGHT: 64 IN | HEART RATE: 56 BPM | SYSTOLIC BLOOD PRESSURE: 88 MMHG

## 2020-01-15 DIAGNOSIS — I25.5 ISCHEMIC CARDIOMYOPATHY: ICD-10-CM

## 2020-01-15 DIAGNOSIS — I25.10 CORONARY ARTERY DISEASE INVOLVING NATIVE CORONARY ARTERY OF NATIVE HEART WITHOUT ANGINA PECTORIS: ICD-10-CM

## 2020-01-15 DIAGNOSIS — I89.0 LYMPHEDEMA OF BOTH LOWER EXTREMITIES: Primary | ICD-10-CM

## 2020-01-15 PROCEDURE — 99214 OFFICE O/P EST MOD 30 MIN: CPT | Mod: HCNC,S$GLB,, | Performed by: INTERNAL MEDICINE

## 2020-01-15 PROCEDURE — 99999 PR PBB SHADOW E&M-EST. PATIENT-LVL IV: ICD-10-PCS | Mod: PBBFAC,HCNC,, | Performed by: INTERNAL MEDICINE

## 2020-01-15 PROCEDURE — 3008F PR BODY MASS INDEX (BMI) DOCUMENTED: ICD-10-PCS | Mod: HCNC,CPTII,S$GLB, | Performed by: INTERNAL MEDICINE

## 2020-01-15 PROCEDURE — 3008F BODY MASS INDEX DOCD: CPT | Mod: HCNC,CPTII,S$GLB, | Performed by: INTERNAL MEDICINE

## 2020-01-15 PROCEDURE — 99999 PR PBB SHADOW E&M-EST. PATIENT-LVL IV: CPT | Mod: PBBFAC,HCNC,, | Performed by: INTERNAL MEDICINE

## 2020-01-15 PROCEDURE — 99214 PR OFFICE/OUTPT VISIT, EST, LEVL IV, 30-39 MIN: ICD-10-PCS | Mod: HCNC,S$GLB,, | Performed by: INTERNAL MEDICINE

## 2020-01-15 NOTE — PROGRESS NOTES
Cardiology Clinic Note  Reason for Visit: Fluid overload    HPI:   Ms. Tasha Hawley is a 64 y.o. woman with history of mid LAD subtotal occlusion(1/7/2016-good collateral flow from RCA, unamenable to PCI with small caliber distal vessel-[facility] done for NSTEMI), neurogenic bladder with chronic suprapubic catheter, Ischemic cardiomyopathy with recovery of EF from 40 to 55 %, chronic pain syndrome with spinal cord stimulator, chronic hypotension, chronic lower extremity edema who presents to clinic with complains of lower extremity swelling that is worsening for last 2-3 months. She has been on lasix 40 mg by mouth daily which she had doubled up for some time before going back to once daily for the fear of running out of her prescription. She denies any more shortness of breath than usual. She is not very active and limited in her mobility due to chronic back pain. She has chest pain occasionally which is sharp not necessarily related to exertion. She does admit to not following a low salt diet and had eaten boiled crawfish few days ago. She denies any dizziness or passing out with her low bp.       · Normal left ventricular systolic function. The estimated ejection fraction is 55%  · Normal LV diastolic function.  · Concentric left ventricular remodeling.  · Mild left atrial enlargement.  · Normal right ventricular systolic function.      Lipid panel 2/21/2019  T chol 122  TG 56  HDL 4  LDL 64    [facility] 1/07/2016  DIAGNOSTIC:       Patient has a right dominant coronary artery.        - Left Main Coronary Artery:             The LM is normal. There is BRENDEN 3 flow.       - Left Anterior Descending Artery:             The mid LAD has subtotal. There is BRENDEN 3 flow. There is collateral flow from the RCA (good). The remaining portion of the vessel is of small caliber.                     Lesion Details:  The length is 30mm. The reference vessel diameter is 1.5 millimeters. The distal LAD appears to be a small caliber  vessel that does not quite reach the apex.       - Left Circumflex Artery:             The LCX is normal. There is BRENDEN 3 flow.       - Right Coronary Artery:  ROS:    Review of Systems   All other systems reviewed and are negative.    PMH:     Past Medical History:   Diagnosis Date    Anticoagulant long-term use     Anxiety     Arthritis     Bilateral lower extremity edema     severe chronic    Carotid artery occlusion     Cataract     Coronary artery disease     subtotalled LAD with collateral    Depression     Fever blister     Hypothyroid     Iron deficiency anemia     Lumbar radiculopathy     with chronic pain    Ocular migraine     Sleep apnea     cpap     Past Surgical History:   Procedure Laterality Date    ADENOIDECTOMY      BACK SURGERY      x 12    CARDIAC CATHETERIZATION  2016    subtotalled LAD with right to left collaterals    CATARACT EXTRACTION W/  INTRAOCULAR LENS IMPLANT Left     Dr Coleman     CYSTOSCOPIC LITHOLAPAXY N/A 6/27/2019    Procedure: CYSTOLITHOLAPAXY;  Surgeon: Shireen Mayo MD;  Location: [facility] OR [address] [place] [address];  Service: Urology;  Laterality: N/A;    CYSTOSCOPIC LITHOLAPAXY N/A 9/3/2019    Procedure: CYSTOLITHOLAPAXY;  Surgeon: Shireen Mayo MD;  Location: [facility] OR 2ND FLR;  Service: Urology;  Laterality: N/A;    ESOPHAGOGASTRODUODENOSCOPY N/A 5/23/2018    Procedure: ESOPHAGOGASTRODUODENOSCOPY (EGD);  Surgeon: Prince Vance MD;  Location: [facility] (4TH FLR);  Service: Endoscopy;  Laterality: N/A;  r/s 'd per Dr. Vance due to family emergency- ER    HYSTERECTOMY  1975    endometriosis    pain pump placement      SQ Dilaudid Pump managed by Dr. Hillman, Pain Management    REPLACEMENT OF CATHETER N/A 10/31/2019    Procedure: REPLACEMENT, CATHETER-SUPRAPUBIC;  Surgeon: Shireen Mayo MD;  Location: [facility] OR 1ST FLR;  Service: Urology;  Laterality: N/A;    SPINAL CORD STIMULATOR REMOVAL      before [place]    SPINE SURGERY  5-13-13    CERVICAL FUSION     TONSILLECTOMY       Allergies:     Review of patient's allergies indicates:   Allergen Reactions    (d)-limonene flavor      Other reaction(s): difficult intubation  Other reaction(s): Difficulty breathing    Bactrim [sulfamethoxazole-trimethoprim] Anaphylaxis    Benadryl [diphenhydramine hcl] Shortness Of Breath    Imitrex [sumatriptan succinate] Shortness Of Breath    Topamax [topiramate] Shortness Of Breath    Vancomycin Shortness Of Breath     Rash    Butorphanol tartrate     Darvocet a500 [propoxyphene n-acetaminophen]      Other reaction(s): Difficulty breathing    Fentanyl      Other reaction(s): Vomiting  Other reaction(s): Nausea  Other reaction(s): Itching  swelling    White petrolatum-zinc     Zinc oxide-white petrolatum      Other reaction(s): Difficulty breathing    Latex, natural rubber Itching and Rash    Phenytoin Rash and Other (See Comments)     Trouble breathing     Medications:     Current Outpatient Medications on File Prior to Visit   Medication Sig Dispense Refill    aspirin (ECOTRIN) 81 MG EC tablet Take 1 tablet (81 mg total) by mouth once daily.  0    atorvastatin (LIPITOR) 80 MG tablet TAKE 1 TABLET BY MOUTH DAILY 90 tablet 3    butalbital-acetaminophen-caffeine -40 mg (FIORICET, ESGIC) -40 mg per tablet       cholecalciferol, vitamin D3, (VITAMIN D3) 5,000 unit Tab Take 5,000 Units by mouth once daily.      docusate sodium (COLACE) 100 MG capsule Take 1 capsule (100 mg total) by mouth 3 (three) times daily as needed for constipation. 180 capsule 3    FLUoxetine 20 MG capsule Take 3 capsules (60 mg total) by mouth once daily. 270 capsule 3    furosemide (LASIX) 40 MG tablet Take 1 tablet (40 mg total) by mouth once daily. 90 tablet 3    HYDROcodone-acetaminophen (NORCO)  mg per tablet Take 1 tablet by mouth every 6 (six) hours as needed for Pain. 15 tablet 0    levothyroxine (SYNTHROID) 125 MCG tablet Take 1 tablet (125 mcg total) by mouth before  breakfast. 90 tablet 3    lidocaine (LIDODERM) 5 % APPLY 1 PATCH DAILY AND WEAR FOR A MAXIMUM OF 12 HOURS  5    pantoprazole (PROTONIX) 40 MG tablet Take 1 tablet (40 mg total) by mouth once daily. 90 tablet 3    potassium citrate (UROCIT-K) 10 mEq (1,080 mg) TbSR Take 2 tablets (20 mEq total) by mouth as needed. 90 tablet 3    promethazine (PHENERGAN) 25 MG tablet Take 25 mg by mouth every 6 (six) hours as needed for Nausea.      QUEtiapine (SEROQUEL) 100 MG Tab TAKE 1 TABLET (100 MG TOTAL) BY MOUTH EVERY EVENING. 90 tablet 1    SENNOSIDES (SENNA LAX ORAL) Take 20 mg by mouth every evening.       traZODone (DESYREL) 100 MG tablet Take 3 tablets (300 mg total) by mouth every evening. 270 tablet 1    [DISCONTINUED] lamotrigine (LAMICTAL) 25 MG tablet Take 1 tablet (25 mg total) by mouth once daily. 30 tablet 6     No current facility-administered medications on file prior to visit.      Social History:     Social History     Tobacco Use    Smoking status: Never Smoker    Smokeless tobacco: Never Used   Substance Use Topics    Alcohol use: Never     Frequency: Never     Family History:     Family History   Problem Relation Age of Onset    Cancer Mother 55        breast    Cancer Father         esophagus,had laryngectomy    Esophageal cancer Father     Parkinsonism Maternal Grandmother     Tremor Maternal Grandmother     No Known Problems Brother     No Known Problems Brother     Heart disease Maternal Uncle     No Known Problems Sister     No Known Problems Maternal Aunt     No Known Problems Paternal Aunt     No Known Problems Paternal Uncle     No Known Problems Maternal Grandfather     No Known Problems Paternal Grandmother     No Known Problems Paternal Grandfather     Melanoma Neg Hx     Amblyopia Neg Hx     Blindness Neg Hx     Cataracts Neg Hx     Diabetes Neg Hx     Glaucoma Neg Hx     Hypertension Neg Hx     Macular degeneration Neg Hx     Retinal detachment Neg Hx      "Strabismus Neg Hx     Stroke Neg Hx     Thyroid disease Neg Hx     Colon cancer Neg Hx      Physical Exam:   BP (!) 88/46 (BP Location: Left arm, Patient Position: Sitting, BP Method: X-Large (Automatic))   Pulse (!) 56   Ht 5' 4" (1.626 m)   Wt 86.6 kg (190 lb 14.7 oz)   LMP  (LMP Unknown)   BMI 32.77 kg/m²      Physical Exam  General: alert, awake and oriented x 3  Eyes:PERRL.   Neck:no JVD   Lungs:  clear to auscultation bilaterally   Cardiovascular: Heart: regular rate and rhythm, S1, S2 normal, no murmur, click, rub or gallop.   Chest Wall: no tenderness.   Pulses-2+ radial, 1+ DP, PT b/l  Extremities: no cyanosis B/l non pitting edema 3+   Abdomen/Rectal: Abdomen: soft, non-tender non-distented; bowel sounds normal  Neurologic: Normal strength and tone. No focal numbness or weakness  Labs:     Lab Results   Component Value Date     09/04/2019    K 3.6 09/04/2019     09/04/2019    CO2 28 09/04/2019    BUN 7 (L) 09/04/2019    CREATININE 0.7 09/04/2019    ANIONGAP 7 (L) 09/04/2019     Lab Results   Component Value Date    HGBA1C 5.4 08/25/2016     Lab Results   Component Value Date     (H) 02/23/2019     (H) 02/21/2019    BNP 69 12/11/2018    Lab Results   Component Value Date    WBC 7.13 09/04/2019    HGB 9.4 (L) 09/04/2019    HCT 30.5 (L) 09/04/2019     09/04/2019    GRAN 5.0 09/04/2019    GRAN 70.7 09/04/2019     Lab Results   Component Value Date    CHOL 122 02/21/2019    HDL 47 02/21/2019    LDLCALC 63.8 02/21/2019    TRIG 56 02/21/2019          Imaging:   No results found for: EF  US venous 12/11/2018  No evidence of DVT B/L    US carotid  11/30/2017    CONCLUSIONS   There is 20 - 39% right Internal Carotid stenosis.  There is 20 - 39% left Internal Carotid stenosis.    Assessment/plan:     1. B/l Lower extremity edema  Differential includes lymphedema, chronic venous insufficiency  She does not have any signs of heart failure   Recommend b/l compression stockings, " leg elevation  Will repeat an echo to rule out right sided heart failure however no JVD on exam  Refer to vascular medicine for evaluation and management for venous insufficiency  Increasing diuretics will not beneficial in this patient with chronic non pitting edema       2. Coronary artery disease involving native coronary artery of native heart without angina pectoris with chronic occlusion of mLAD and good collaterals from RCA  She has atypical chest pain which does not appear to anginal  Continue aspirin, statin  BP chronically low to initiate BB therapy  Echo-if ef normal with no WMA no further workup needed currently   3. Ischemic cardiomyopathy with recovery of EF to normal  No clinical signs of heart failure  BP chronically low to initiate HF therapy  Repeat echo for above, doubt she has depressed EF again           Discussed with Dr. Plascencia. RTC in 1 month.         BEBETO SOFIA  CARDIOLOGY FELLOW, PGY 6  [phone]

## 2020-01-15 NOTE — TELEPHONE ENCOUNTER
----- Message from Shyann López sent at 1/15/2020 12:25 PM CST -----  pt's home health nurse collected specimen for UA and pt is calling to find out if it has been resulted so that med can be called in for her    Pt contact

## 2020-01-16 ENCOUNTER — PATIENT OUTREACH (OUTPATIENT)
Dept: ADMINISTRATIVE | Facility: OTHER | Age: 65
End: 2020-01-16

## 2020-01-16 DIAGNOSIS — Z12.31 ENCOUNTER FOR SCREENING MAMMOGRAM FOR MALIGNANT NEOPLASM OF BREAST: Primary | ICD-10-CM

## 2020-01-17 ENCOUNTER — TELEPHONE (OUTPATIENT)
Dept: UROLOGY | Facility: CLINIC | Age: 65
End: 2020-01-17

## 2020-01-17 DIAGNOSIS — N30.90 BLADDER INFECTION: Primary | ICD-10-CM

## 2020-01-17 RX ORDER — AMOXICILLIN AND CLAVULANATE POTASSIUM 500; 125 MG/1; MG/1
TABLET, FILM COATED ORAL
COMMUNITY
Start: 2020-01-17 | End: 2020-01-20

## 2020-01-17 NOTE — TELEPHONE ENCOUNTER
----- Message from Rita Persaud sent at 1/17/2020  9:35 AM CST -----  Contact: pt   Pt is calling for results of her urine sample.  Call back [phone]

## 2020-01-17 NOTE — TELEPHONE ENCOUNTER
----- Message from Merly Priest sent at 1/17/2020  1:24 PM CST -----  Contact: Karine with [facility]  [phone]  Karine says they no longer cover [employer]. Pt will have to go somewhere else.  Karine said will see up to next Friday the 01/24/2020   Can you please call Karine

## 2020-01-20 RX ORDER — CEFDINIR 300 MG/1
300 CAPSULE ORAL 2 TIMES DAILY
Qty: 14 CAPSULE | Refills: 0 | Status: ON HOLD | OUTPATIENT
Start: 2020-01-20 | End: 2020-01-28

## 2020-01-20 NOTE — TELEPHONE ENCOUNTER
Omnicef was sent to her preferred pharmacy.  The home health faxed the results today and she had proteus mirabilis which was resistant to cipro levofloxacin, macrobid.     Hard copy sent to the scanner.

## 2020-01-20 NOTE — TELEPHONE ENCOUNTER
Omnicef was sent to her preferred pharmacy.  The home health faxed the results today and she had proteus mirabilis which was resistant to cipro levofloxacin, macrobid.

## 2020-01-20 NOTE — TELEPHONE ENCOUNTER
----- Message from Karoline Land MA sent at 1/20/2020  8:55 AM CST -----  Contact: [phone]   Pt states that she did a urine collect last Tuesday and has not heard back about the results and if she need antibiotics. She said that she feels that she has an infection     [phone]

## 2020-01-22 ENCOUNTER — PATIENT OUTREACH (OUTPATIENT)
Dept: ADMINISTRATIVE | Facility: OTHER | Age: 65
End: 2020-01-22

## 2020-01-27 ENCOUNTER — NURSE TRIAGE (OUTPATIENT)
Dept: ADMINISTRATIVE | Facility: CLINIC | Age: 65
End: 2020-01-27

## 2020-01-27 ENCOUNTER — HOSPITAL ENCOUNTER (INPATIENT)
Facility: HOSPITAL | Age: 65
LOS: 4 days | Discharge: HOME HEALTH | DRG: 389 | End: 2020-01-31
Attending: EMERGENCY MEDICINE | Admitting: EMERGENCY MEDICINE
Payer: MEDICARE

## 2020-01-27 ENCOUNTER — TELEPHONE (OUTPATIENT)
Dept: UROLOGY | Facility: CLINIC | Age: 65
End: 2020-01-27

## 2020-01-27 DIAGNOSIS — R60.9 EDEMA, UNSPECIFIED TYPE: ICD-10-CM

## 2020-01-27 DIAGNOSIS — R00.1 BRADYCARDIA: ICD-10-CM

## 2020-01-27 DIAGNOSIS — N39.0 RECURRENT UTI: ICD-10-CM

## 2020-01-27 DIAGNOSIS — R94.31 QT PROLONGATION: ICD-10-CM

## 2020-01-27 DIAGNOSIS — R06.02 SHORTNESS OF BREATH: ICD-10-CM

## 2020-01-27 DIAGNOSIS — R07.9 CHEST PAIN: ICD-10-CM

## 2020-01-27 DIAGNOSIS — R10.32 LEFT LOWER QUADRANT ABDOMINAL PAIN: Primary | ICD-10-CM

## 2020-01-27 DIAGNOSIS — R60.0 LOWER EXTREMITY EDEMA: ICD-10-CM

## 2020-01-27 PROBLEM — K56.600 PARTIAL SMALL BOWEL OBSTRUCTION: Status: ACTIVE | Noted: 2020-01-27

## 2020-01-27 LAB
ALBUMIN SERPL BCP-MCNC: 4 G/DL (ref 3.5–5.2)
ALP SERPL-CCNC: 111 U/L (ref 55–135)
ALT SERPL W/O P-5'-P-CCNC: 12 U/L (ref 10–44)
ANION GAP SERPL CALC-SCNC: 11 MMOL/L (ref 8–16)
AST SERPL-CCNC: 17 U/L (ref 10–40)
BACTERIA #/AREA URNS AUTO: NORMAL /HPF
BASOPHILS # BLD AUTO: 0.02 K/UL (ref 0–0.2)
BASOPHILS NFR BLD: 0.3 % (ref 0–1.9)
BILIRUB SERPL-MCNC: 0.3 MG/DL (ref 0.1–1)
BILIRUB UR QL STRIP: NEGATIVE
BNP SERPL-MCNC: 17 PG/ML (ref 0–99)
BUN SERPL-MCNC: 15 MG/DL (ref 8–23)
CALCIUM SERPL-MCNC: 9.5 MG/DL (ref 8.7–10.5)
CHLORIDE SERPL-SCNC: 96 MMOL/L (ref 95–110)
CLARITY UR REFRACT.AUTO: CLEAR
CO2 SERPL-SCNC: 31 MMOL/L (ref 23–29)
COLOR UR AUTO: ABNORMAL
CREAT SERPL-MCNC: 1.1 MG/DL (ref 0.5–1.4)
DIFFERENTIAL METHOD: ABNORMAL
EOSINOPHIL # BLD AUTO: 0.2 K/UL (ref 0–0.5)
EOSINOPHIL NFR BLD: 4.1 % (ref 0–8)
ERYTHROCYTE [DISTWIDTH] IN BLOOD BY AUTOMATED COUNT: 13.3 % (ref 11.5–14.5)
EST. GFR  (AFRICAN AMERICAN): >60 ML/MIN/1.73 M^2
EST. GFR  (NON AFRICAN AMERICAN): 53.6 ML/MIN/1.73 M^2
GLUCOSE SERPL-MCNC: 76 MG/DL (ref 70–110)
GLUCOSE UR QL STRIP: NEGATIVE
HCT VFR BLD AUTO: 35.5 % (ref 37–48.5)
HGB BLD-MCNC: 10.8 G/DL (ref 12–16)
HGB UR QL STRIP: ABNORMAL
IMM GRANULOCYTES # BLD AUTO: 0.02 K/UL (ref 0–0.04)
IMM GRANULOCYTES NFR BLD AUTO: 0.3 % (ref 0–0.5)
KETONES UR QL STRIP: NEGATIVE
LACTATE SERPL-SCNC: 1.4 MMOL/L (ref 0.5–2.2)
LEUKOCYTE ESTERASE UR QL STRIP: NEGATIVE
LIPASE SERPL-CCNC: 15 U/L (ref 4–60)
LYMPHOCYTES # BLD AUTO: 1.8 K/UL (ref 1–4.8)
LYMPHOCYTES NFR BLD: 30.3 % (ref 18–48)
MCH RBC QN AUTO: 27.1 PG (ref 27–31)
MCHC RBC AUTO-ENTMCNC: 30.4 G/DL (ref 32–36)
MCV RBC AUTO: 89 FL (ref 82–98)
MICROSCOPIC COMMENT: NORMAL
MONOCYTES # BLD AUTO: 0.6 K/UL (ref 0.3–1)
MONOCYTES NFR BLD: 10.2 % (ref 4–15)
NEUTROPHILS # BLD AUTO: 3.2 K/UL (ref 1.8–7.7)
NEUTROPHILS NFR BLD: 54.8 % (ref 38–73)
NITRITE UR QL STRIP: NEGATIVE
NRBC BLD-RTO: 0 /100 WBC
PH UR STRIP: 6 [PH] (ref 5–8)
PLATELET # BLD AUTO: 194 K/UL (ref 150–350)
PMV BLD AUTO: 10.4 FL (ref 9.2–12.9)
POTASSIUM SERPL-SCNC: 3.6 MMOL/L (ref 3.5–5.1)
PROT SERPL-MCNC: 7.3 G/DL (ref 6–8.4)
PROT UR QL STRIP: NEGATIVE
RBC # BLD AUTO: 3.99 M/UL (ref 4–5.4)
RBC #/AREA URNS AUTO: 3 /HPF (ref 0–4)
SODIUM SERPL-SCNC: 138 MMOL/L (ref 136–145)
SP GR UR STRIP: 1.02 (ref 1–1.03)
TROPONIN I SERPL DL<=0.01 NG/ML-MCNC: 0.03 NG/ML (ref 0–0.03)
URN SPEC COLLECT METH UR: ABNORMAL
WBC # BLD AUTO: 5.8 K/UL (ref 3.9–12.7)
WBC #/AREA URNS AUTO: 0 /HPF (ref 0–5)

## 2020-01-27 PROCEDURE — 93010 EKG 12-LEAD: ICD-10-PCS | Mod: HCNC,,, | Performed by: INTERNAL MEDICINE

## 2020-01-27 PROCEDURE — 99285 EMERGENCY DEPT VISIT HI MDM: CPT | Mod: 25,HCNC

## 2020-01-27 PROCEDURE — 83605 ASSAY OF LACTIC ACID: CPT | Mod: HCNC

## 2020-01-27 PROCEDURE — 63600175 PHARM REV CODE 636 W HCPCS: Mod: HCNC | Performed by: STUDENT IN AN ORGANIZED HEALTH CARE EDUCATION/TRAINING PROGRAM

## 2020-01-27 PROCEDURE — 81001 URINALYSIS AUTO W/SCOPE: CPT | Mod: HCNC

## 2020-01-27 PROCEDURE — 99285 EMERGENCY DEPT VISIT HI MDM: CPT | Mod: HCNC,,, | Performed by: EMERGENCY MEDICINE

## 2020-01-27 PROCEDURE — S0030 INJECTION, METRONIDAZOLE: HCPCS | Mod: HCNC | Performed by: STUDENT IN AN ORGANIZED HEALTH CARE EDUCATION/TRAINING PROGRAM

## 2020-01-27 PROCEDURE — 99285 PR EMERGENCY DEPT VISIT,LEVEL V: ICD-10-PCS | Mod: HCNC,,, | Performed by: EMERGENCY MEDICINE

## 2020-01-27 PROCEDURE — 25000003 PHARM REV CODE 250: Mod: HCNC | Performed by: STUDENT IN AN ORGANIZED HEALTH CARE EDUCATION/TRAINING PROGRAM

## 2020-01-27 PROCEDURE — 80053 COMPREHEN METABOLIC PANEL: CPT | Mod: HCNC

## 2020-01-27 PROCEDURE — 85025 COMPLETE CBC W/AUTO DIFF WBC: CPT | Mod: HCNC

## 2020-01-27 PROCEDURE — 93010 ELECTROCARDIOGRAM REPORT: CPT | Mod: HCNC,,, | Performed by: INTERNAL MEDICINE

## 2020-01-27 PROCEDURE — 93005 ELECTROCARDIOGRAM TRACING: CPT | Mod: HCNC

## 2020-01-27 PROCEDURE — 99221 PR INITIAL HOSPITAL CARE,LEVL I: ICD-10-PCS | Mod: HCNC,,, | Performed by: SURGERY

## 2020-01-27 PROCEDURE — 12000002 HC ACUTE/MED SURGE SEMI-PRIVATE ROOM: Mod: HCNC

## 2020-01-27 PROCEDURE — 25500020 PHARM REV CODE 255: Mod: HCNC | Performed by: EMERGENCY MEDICINE

## 2020-01-27 PROCEDURE — 99221 1ST HOSP IP/OBS SF/LOW 40: CPT | Mod: HCNC,,, | Performed by: SURGERY

## 2020-01-27 PROCEDURE — 83880 ASSAY OF NATRIURETIC PEPTIDE: CPT | Mod: HCNC

## 2020-01-27 PROCEDURE — 83690 ASSAY OF LIPASE: CPT | Mod: HCNC

## 2020-01-27 PROCEDURE — 84484 ASSAY OF TROPONIN QUANT: CPT | Mod: HCNC

## 2020-01-27 RX ORDER — GLUCAGON 1 MG
1 KIT INJECTION
Status: DISCONTINUED | OUTPATIENT
Start: 2020-01-27 | End: 2020-01-31 | Stop reason: HOSPADM

## 2020-01-27 RX ORDER — PANTOPRAZOLE SODIUM 40 MG/10ML
40 INJECTION, POWDER, LYOPHILIZED, FOR SOLUTION INTRAVENOUS DAILY
Status: DISCONTINUED | OUTPATIENT
Start: 2020-01-28 | End: 2020-01-29

## 2020-01-27 RX ORDER — LIDOCAINE 50 MG/G
1 PATCH TOPICAL DAILY PRN
Status: DISCONTINUED | OUTPATIENT
Start: 2020-01-27 | End: 2020-01-31 | Stop reason: HOSPADM

## 2020-01-27 RX ORDER — NALOXONE HCL 0.4 MG/ML
0.4 VIAL (ML) INJECTION
Status: DISCONTINUED | OUTPATIENT
Start: 2020-01-27 | End: 2020-01-31 | Stop reason: HOSPADM

## 2020-01-27 RX ORDER — METRONIDAZOLE 500 MG/100ML
500 INJECTION, SOLUTION INTRAVENOUS
Status: DISCONTINUED | OUTPATIENT
Start: 2020-01-27 | End: 2020-01-30

## 2020-01-27 RX ORDER — IPRATROPIUM BROMIDE AND ALBUTEROL SULFATE 2.5; .5 MG/3ML; MG/3ML
3 SOLUTION RESPIRATORY (INHALATION) EVERY 6 HOURS PRN
Status: DISCONTINUED | OUTPATIENT
Start: 2020-01-27 | End: 2020-01-31 | Stop reason: HOSPADM

## 2020-01-27 RX ORDER — BISACODYL 10 MG
10 SUPPOSITORY, RECTAL RECTAL DAILY PRN
Status: DISCONTINUED | OUTPATIENT
Start: 2020-01-27 | End: 2020-01-31 | Stop reason: HOSPADM

## 2020-01-27 RX ORDER — IBUPROFEN 200 MG
16 TABLET ORAL
Status: DISCONTINUED | OUTPATIENT
Start: 2020-01-27 | End: 2020-01-31 | Stop reason: HOSPADM

## 2020-01-27 RX ORDER — DEXTROSE MONOHYDRATE AND SODIUM CHLORIDE 5; .9 G/100ML; G/100ML
INJECTION, SOLUTION INTRAVENOUS CONTINUOUS
Status: ACTIVE | OUTPATIENT
Start: 2020-01-27 | End: 2020-01-28

## 2020-01-27 RX ORDER — MORPHINE SULFATE 4 MG/ML
4 INJECTION, SOLUTION INTRAMUSCULAR; INTRAVENOUS EVERY 6 HOURS PRN
Status: DISCONTINUED | OUTPATIENT
Start: 2020-01-27 | End: 2020-01-29

## 2020-01-27 RX ORDER — SODIUM CHLORIDE 0.9 % (FLUSH) 0.9 %
10 SYRINGE (ML) INJECTION
Status: DISCONTINUED | OUTPATIENT
Start: 2020-01-27 | End: 2020-01-28

## 2020-01-27 RX ORDER — ATROPINE SULFATE 0.1 MG/ML
0.5 INJECTION INTRAVENOUS ONCE AS NEEDED
Status: DISCONTINUED | OUTPATIENT
Start: 2020-01-27 | End: 2020-01-29

## 2020-01-27 RX ORDER — IBUPROFEN 200 MG
24 TABLET ORAL
Status: DISCONTINUED | OUTPATIENT
Start: 2020-01-27 | End: 2020-01-31 | Stop reason: HOSPADM

## 2020-01-27 RX ORDER — SODIUM CHLORIDE 9 MG/ML
INJECTION, SOLUTION INTRAVENOUS CONTINUOUS
Status: DISCONTINUED | OUTPATIENT
Start: 2020-01-27 | End: 2020-01-27

## 2020-01-27 RX ORDER — ENOXAPARIN SODIUM 100 MG/ML
40 INJECTION SUBCUTANEOUS EVERY 24 HOURS
Status: DISCONTINUED | OUTPATIENT
Start: 2020-01-27 | End: 2020-01-31 | Stop reason: HOSPADM

## 2020-01-27 RX ADMIN — DEXTROSE AND SODIUM CHLORIDE: 5; .9 INJECTION, SOLUTION INTRAVENOUS at 10:01

## 2020-01-27 RX ADMIN — METRONIDAZOLE 500 MG: 500 INJECTION, SOLUTION INTRAVENOUS at 10:01

## 2020-01-27 RX ADMIN — CEFTRIAXONE 2 G: 2 INJECTION, SOLUTION INTRAVENOUS at 08:01

## 2020-01-27 RX ADMIN — IOHEXOL 100 ML: 350 INJECTION, SOLUTION INTRAVENOUS at 03:01

## 2020-01-27 RX ADMIN — ENOXAPARIN SODIUM 40 MG: 100 INJECTION SUBCUTANEOUS at 08:01

## 2020-01-27 RX ADMIN — MORPHINE SULFATE 4 MG: 4 INJECTION, SOLUTION INTRAMUSCULAR; INTRAVENOUS at 09:01

## 2020-01-27 NOTE — ED NOTES
Pt identifiers Tasha Hawley were checked and are correct  LOC: The patient is awake, alert, aware of environment with an appropriate affect. Oriented x4, speaking appropriately  APPEARANCE: Pt abd pain an 8/10 , in no acute distress, pt is clean and well groomed, clothing properly fastened  SKIN: Skin warm, dry and intact, normal skin turgor, moist mucus membranes Redness noted to lower extremities  Redness noted to lower extremities  RESPIRATORY: Airway is open and patent, respirations are spontaneous, even and unlabored, normal effort and rate Breath sounds clear alyson to all lung fields on auscultation  CARDIAC: Radial pulses strong andreg Bradycardia noted with heart rate of 53 bpm, 1 +  peripheral edema noted, capillary refill < 3 seconds, bilateral radial pulses 2+  ABDOMEN: Soft, nontender, nondistended. Bowel sounds present to all four quad of abd on auscultation  Suprapubic catheter noted with clear yellow colored urine noted to drain  NEUROLOGIC:  facial expression is symmetrical, patient moving all extremities spontaneously, normal sensation in all extremities when touched with a finger.  Follows all commands appropriately  MUSCULOSKELETAL: No obvious deformities.

## 2020-01-27 NOTE — ED TRIAGE NOTES
Patient comes into ER with complaints of SOB since yesterday. Patient states that her BP was low and Pulse was low as well. Patient does have a pain pump that was just upped by 10%. Patient denies fever.

## 2020-01-27 NOTE — ED PROVIDER NOTES
Encounter Date: 1/27/2020       History     Chief Complaint   Patient presents with    Abdominal Pain     patient with suprapubic catheter just finished two rounds of antibiotic for a UTI.  Home health stated that her heart rate was low and sent her to the ED.  Patient reporting abdominal pain and bloating as well     64-year-old female with multiple medical problems presents with multiple complaints. Patient was sent in by her home health nurse after having heart rates in the 40s.  She recently had an increase in her Dilaudid pump.  She has had 12 back surgeries and has a neurogenic bladder.  She has associated decreased sleep.  With her bradycardia she has also had left lower quadrant abdominal pain and shortness of breath. Patient states she has worsening lymphedema.  She is pending follow-up with Cardiology later this week.  Patient has also had discomfort in her chest and neck.  She denies nausea, vomiting, or fever.  Her suprapubic catheter was changed last week.  A ten point review of systems was completed and is negative except as documented above.  Patient denies any other acute medical complaint.  The patients available PMH, PSH, Social History, medications, allergies, and triage vital signs were reviewed just prior to their medical evaluation.        Review of patient's allergies indicates:   Allergen Reactions    (d)-limonene flavor      Other reaction(s): difficult intubation  Other reaction(s): Difficulty breathing    Bactrim [sulfamethoxazole-trimethoprim] Anaphylaxis    Benadryl [diphenhydramine hcl] Shortness Of Breath    Imitrex [sumatriptan succinate] Shortness Of Breath    Topamax [topiramate] Shortness Of Breath    Vancomycin Shortness Of Breath     Rash    Butorphanol tartrate     Darvocet a500 [propoxyphene n-acetaminophen]      Other reaction(s): Difficulty breathing    Fentanyl      Other reaction(s): Vomiting  Other reaction(s): Nausea  Other reaction(s): Itching  swelling     White petrolatum-zinc     Zinc oxide-white petrolatum      Other reaction(s): Difficulty breathing    Latex, natural rubber Itching and Rash    Phenytoin Rash and Other (See Comments)     Trouble breathing     Past Medical History:   Diagnosis Date    Anticoagulant long-term use     Anxiety     Arthritis     Bilateral lower extremity edema     severe chronic    Carotid artery occlusion     Cataract     Coronary artery disease     subtotalled LAD with collateral    Depression     Fever blister     Hypothyroid     Iron deficiency anemia     Lumbar radiculopathy     with chronic pain    Ocular migraine     Sleep apnea     cpap     Past Surgical History:   Procedure Laterality Date    ADENOIDECTOMY      BACK SURGERY      x 12    CARDIAC CATHETERIZATION  2016    subtotalled LAD with right to left collaterals    CATARACT EXTRACTION W/  INTRAOCULAR LENS IMPLANT Left     Dr Coleman     CYSTOSCOPIC LITHOLAPAXY N/A 6/27/2019    Procedure: CYSTOLITHOLAPAXY;  Surgeon: Shireen Mayo MD;  Location: [facility] OR [address] [place] [address];  Service: Urology;  Laterality: N/A;    CYSTOSCOPIC LITHOLAPAXY N/A 9/3/2019    Procedure: CYSTOLITHOLAPAXY;  Surgeon: Shireen Mayo MD;  [facility];  Service: Urology;  Laterality: N/A;    ESOPHAGOGASTRODUODENOSCOPY N/A 5/23/2018    Procedure: ESOPHAGOGASTRODUODENOSCOPY (EGD);  Surgeon: Prince Vance MD;  Location: [facility] (4TH FLR);  Service: Endoscopy;  Laterality: N/A;  r/s 'd per Dr. Vance due to family emergency- ER    HYSTERECTOMY  1975    endometriosis    pain pump placement      SQ Dilaudid Pump managed by Dr. Hillman, Pain Management    REPLACEMENT OF CATHETER N/A 10/31/2019    Procedure: REPLACEMENT, CATHETER-SUPRAPUBIC;  Surgeon: Shireen Mayo MD;  Location: [facility] OR 1ST FLR;  Service: Urology;  Laterality: N/A;    SPINAL CORD STIMULATOR REMOVAL      before [place]    SPINE SURGERY  5-13-13    CERVICAL FUSION    TONSILLECTOMY       Family  History   Problem Relation Age of Onset    Cancer Mother 55        breast    Cancer Father         esophagus,had laryngectomy    Esophageal cancer Father     Parkinsonism Maternal Grandmother     Tremor Maternal Grandmother     No Known Problems Brother     No Known Problems Brother     Heart disease Maternal Uncle     No Known Problems Sister     No Known Problems Maternal Aunt     No Known Problems Paternal Aunt     No Known Problems Paternal Uncle     No Known Problems Maternal Grandfather     No Known Problems Paternal Grandmother     No Known Problems Paternal Grandfather     Melanoma Neg Hx     Amblyopia Neg Hx     Blindness Neg Hx     Cataracts Neg Hx     Diabetes Neg Hx     Glaucoma Neg Hx     Hypertension Neg Hx     Macular degeneration Neg Hx     Retinal detachment Neg Hx     Strabismus Neg Hx     Stroke Neg Hx     Thyroid disease Neg Hx     Colon cancer Neg Hx      Social History     Tobacco Use    Smoking status: Never Smoker    Smokeless tobacco: Never Used   Substance Use Topics    Alcohol use: Never     Frequency: Never    Drug use: No     Review of Systems   Constitutional: Negative for fever.   HENT: Negative for sore throat.    Eyes: Negative for visual disturbance.   Respiratory: Positive for shortness of breath. Negative for cough.    Cardiovascular: Positive for chest pain and leg swelling.   Gastrointestinal: Positive for abdominal pain. Negative for nausea and vomiting.   Genitourinary: Negative for dysuria.   Musculoskeletal: Positive for neck pain.   Skin: Negative for rash and wound.   Allergic/Immunologic: Negative for immunocompromised state.   Neurological: Negative for syncope.   Psychiatric/Behavioral: Negative for confusion.       Physical Exam     Initial Vitals [01/27/20 1317]   BP Pulse Resp Temp SpO2   (!) 106/57 (!) 55 16 97.4 °F (36.3 °C) 97 %      MAP       --         Physical Exam    Nursing note and vitals reviewed.  Constitutional: She appears  well-developed and well-nourished. She is not diaphoretic. No distress.   HENT:   Head: Normocephalic and atraumatic.   Nose: Nose normal.   Eyes: Conjunctivae are normal. Right eye exhibits no discharge. Left eye exhibits no discharge.   Neck: Normal range of motion. Neck supple.   Cardiovascular: Regular rhythm and normal heart sounds. Exam reveals no gallop and no friction rub.    No murmur heard.  bradycardia   Pulmonary/Chest: No respiratory distress. She has no wheezes. She has no rhonchi. She has rales.   RLL rales   Abdominal: Soft. She exhibits no distension. There is tenderness. There is no rebound and no guarding.   LLQ ttp, superpubic cath site c/d/i   Musculoskeletal: Normal range of motion. She exhibits edema. She exhibits no tenderness.   BLE 1+ edema, midline back pain is chronic   Neurological: She is alert and oriented to person, place, and time. She has normal strength. GCS score is 15. GCS eye subscore is 4. GCS verbal subscore is 5. GCS motor subscore is 6.   Skin: Skin is warm and dry. No rash noted. No erythema.   Psychiatric: She has a normal mood and affect. Her behavior is normal. Judgment and thought content normal.         ED Course   Procedures  Labs Reviewed   CBC W/ AUTO DIFFERENTIAL - Abnormal; Notable for the following components:       Result Value    RBC 3.99 (*)     Hemoglobin 10.8 (*)     Hematocrit 35.5 (*)     Mean Corpuscular Hemoglobin Conc 30.4 (*)     All other components within normal limits   COMPREHENSIVE METABOLIC PANEL - Abnormal; Notable for the following components:    CO2 31 (*)     eGFR if non  53.6 (*)     All other components within normal limits   URINALYSIS, REFLEX TO URINE CULTURE - Abnormal; Notable for the following components:    Occult Blood UA 1+ (*)     All other components within normal limits    Narrative:     Preferred Collection Type->Urine, Clean Catch   TROPONIN I - Abnormal; Notable for the following components:    Troponin I 0.033  (*)     All other components within normal limits    Narrative:     ADD ON BNP [id number] PER DR. MICHEL.  01/27/2020  17:11   ADD ON TROP [id number] PER DR. MICHEL.  01/27/2020  18:12    LIPASE   LACTIC ACID, PLASMA   B-TYPE NATRIURETIC PEPTIDE   TROPONIN I   URINALYSIS MICROSCOPIC    Narrative:     Preferred Collection Type->Urine, Clean Catch   B-TYPE NATRIURETIC PEPTIDE    Narrative:     ADD ON BNP [id number] PER DR. MICHEL.  01/27/2020  17:11    PROTIME-INR   APTT   C-REACTIVE PROTEIN   SEDIMENTATION RATE     EKG Readings: (Independently Interpreted)   Initial Reading: No STEMI. Rhythm: Sinus Bradycardia. Heart Rate: 54. Ectopy: No Ectopy. Conduction: Normal. ST Segments: Normal ST Segments. T Waves: Normal.   poor qrs progression in V3-4       Imaging Results           CT Abdomen Pelvis With Contrast (Final result)  Result time 01/27/20 16:09:44    Final result by Simone Rivera MD (01/27/20 16:09:44)                 Impression:      This report was flagged in Epic as abnormal.    1. Dilated distal small bowel loops noting slow flow through these loops.  The distal most small bowel is decompressed without discrete transition point identified however transition would likely reside within the right lower quadrant if present.  Findings overall are nonspecific, with differential including developing partial small bowel obstruction on the basis of adhesion versus nonspecific infectious or inflammatory enteritis.  Correlation is advised.  No pneumatosis or free air at this time.  Follow-up recommended.  2. Large amount of stool within the colon, suggesting constipation.  3. Moderate to large hiatal hernia.  4. Several additional findings above.      Electronically signed by: Simone Rivera MD  Date:    01/27/2020  Time:    16:09             Narrative:    EXAMINATION:  CT ABDOMEN PELVIS WITH CONTRAST    CLINICAL HISTORY:  LLQ pain, suspect diverticulitis;    TECHNIQUE:  Low dose axial images, sagittal and coronal  reformations were obtained from the lung bases to the pubic symphysis following the IV administration of 100 mL of Omnipaque 350 .  Oral contrast was not given.    COMPARISON:  02/24/2019    FINDINGS:  Images of the lower thorax are remarkable for mild dependent atelectasis.  There is a moderate to large hiatal hernia.  The liver is slightly hypoattenuating, likely related to contrast phase however correlation with LFTs is recommended as this could reflect steatosis.  The liver is mildly enlarged.  The spleen, pancreas, gallbladder and adrenal glands are unremarkable.  The stomach is mildly distended with ingested content, no gastric wall thickening.  The portal vein, splenic vein, SMV, celiac axis and SMA all are patent.  Pancreatic duct is not dilated.  No significant abdominal lymphadenopathy.    The kidneys enhance symmetrically without hydronephrosis or nephrolithiasis.  The bilateral ureters are unable to be followed in their entirety to the urinary bladder.  Additionally, there is extensive streak artifact related to lumbar fusion hardware, further limiting evaluation of the distal ureters.  No secondary findings to suggest obstructive uropathy.  There is a suprapubic catheter, the urinary bladder is mildly distended.  The subcutaneous course of the suprapubic catheter is without focal organized fluid collection noting mild induration along the course.  The uterus is absent the adnexa is grossly unremarkable.    There is moderate to large amount of stool throughout the colon.  Several scattered colonic diverticula are noted without surrounding inflammation.  The terminal ileum and appendix are unremarkable.  There are several fluid-filled small bowel loops within the deep pelvis, some of which with fecalization and slow flow.  The distal most small bowel is decompressed.  No clear discrete transition point is identified.  No pneumatosis or free air.    Degenerative changes are noted of the pubic symphysis.   There is osteopenia.  Surgical changes are noted of the spine.  Spinal stimulator noted.  Spinal fusion hardware spans L3 through S1.  No significant inguinal lymphadenopathy.  Stimulator device noted within the anterior abdominal wall on the right, no surrounding fluid collection.                               X-Ray Chest AP Portable (Final result)  Result time 01/27/20 15:40:34    Final result by Starr Connell MD (01/27/20 15:40:34)                 Impression:      No acute disease identified in this patient with shortness of breath.  Study is limited by large body habitus, bedside technique and the patient's kyphotic posture.      Electronically signed by: Starr Connell MD  Date:    01/27/2020  Time:    15:40             Narrative:    EXAMINATION:  XR CHEST AP PORTABLE    CLINICAL HISTORY:  Shortness of breath    TECHNIQUE:  Single frontal view of the chest was performed.    COMPARISON:  08/22/2019.  02/23/2019.    FINDINGS:  X-ray beam attenuation and scatter occur in generous overlying soft tissues.    The patient's chin projects over the upper mediastinum and pulmonary apices obscuring some detail.  Study was obtained with the patient in kyphotic position.  Spinal stimulator leads project over the thoracic spine.    The patient is in slight left posterior oblique rotation.  Allowing for this, mediastinal structures are midline.    Within the limits of the study I detect no acute pulmonary disease and no pleural fluid, mediastinal shift, pneumothorax, pneumomediastinum, pneumoperitoneum or significant osseous abnormality.                                 Medical Decision Making:   History:   I obtained history from: someone other than patient.       <> Summary of History: Daughter assists HPI  Old Medical Records: I decided to obtain old medical records.  Old Records Summarized: records from previous admission(s).       <> Summary of Records: Cr normal  Independently Interpreted Test(s):   I have ordered  and independently interpreted X-rays - see prior notes.  I have ordered and independently interpreted EKG Reading(s) - see prior notes  Clinical Tests:   Lab Tests: Ordered and Reviewed  Radiological Study: Ordered and Reviewed  Medical Tests: Ordered and Reviewed  ED Management:  64-year-old female with multiple medical problems presents with multiple complaints. Vitals with bradycardia.  Physical exam as above.  Labs unremarkable.  CT with possible partial SBO vs ileus vs constipation.  D/W GS who evaluated at bedside and recommends gastrograffin study.  Admitted to IM given her complex medical history.  NPO except ice chips.  Did bedside teaching.  All questions answered.  Patient acknowledges understanding.  Other:   I have discussed this case with another health care provider.       <> Summary of the Discussion: GS, ed eval.  IM, admission                                 Clinical Impression:   Final diagnoses:  [R07.9] Chest pain  [R06.02] Shortness of breath  [R10.32] Left lower quadrant abdominal pain (Primary)      Disposition:   Disposition: Admitted  Condition: Stable    Level of Complexity:  High, level 5.                 Betito Love MD  01/27/20 5684

## 2020-01-27 NOTE — TELEPHONE ENCOUNTER
----- Message from Rita Persaud sent at 1/27/2020  8:29 AM CST -----  Contact: pt   Pt called would like to speak with a nurse. Pt stated she have a lot of pressure, burning,  Discomfort, and some leaking.  Call back # [phone]

## 2020-01-27 NOTE — TELEPHONE ENCOUNTER
Saray Thompson, home health nurse calling, she is in the home with the pt now, states pt reports low bp for several days, and not feeling well.  Today her heart rate is 46,and bp 102/68, feels light headed, like she's going to pass out, and mildly sob.  A lot of swelling in her pelvic area, and lower leg and foot swelling bilaterally.  Her urinary output, through supra pubic catheter, is significantly decreased, but urine is clear and yellow.  (of note: pain pump just refilled)  (Also of note: pt only has one more hh visit approved, and she will need to be arranged with another agency due to being out of network)    Reason for Disposition   Heart beating very slowly (e.g., < 50 / minute) (EXCEPTION: athlete)   Difficulty breathing   Dizziness, lightheadedness, or weakness    Additional Information   Negative: Passed out (i.e., fainted, collapsed and was not responding)   Negative: Shock suspected (e.g., cold/pale/clammy skin, too weak to stand, low BP, rapid pulse)   Negative: Difficult to awaken or acting confused (e.g., disoriented, slurred speech)   Negative: Visible sweat on face or sweat dripping down face   Negative: Unable to walk, or can only walk with assistance (e.g., requires support)   Negative: Received SHOCK from implantable cardiac defibrillator and has persisting symptoms (i.e., palpitations, lightheadedness)   Negative: Sounds like a life-threatening emergency to the triager   Negative: Chest pain   Negative: Heart beating very rapidly (e.g., > 140 / minute) and present now (EXCEPTION: during exercise)    Protocols used: HEART RATE AND HEARTBEAT [id number]

## 2020-01-28 ENCOUNTER — TELEPHONE (OUTPATIENT)
Dept: CARDIOLOGY | Facility: CLINIC | Age: 65
End: 2020-01-28

## 2020-01-28 LAB
ALBUMIN SERPL BCP-MCNC: 3.1 G/DL (ref 3.5–5.2)
ALP SERPL-CCNC: 87 U/L (ref 55–135)
ALT SERPL W/O P-5'-P-CCNC: 7 U/L (ref 10–44)
ANION GAP SERPL CALC-SCNC: 8 MMOL/L (ref 8–16)
ASCENDING AORTA: 2.69 CM
AST SERPL-CCNC: 12 U/L (ref 10–40)
AV INDEX (PROSTH): 0.76
AV MEAN GRADIENT: 3 MMHG
AV PEAK GRADIENT: 8 MMHG
AV VALVE AREA: 2.67 CM2
AV VELOCITY RATIO: 0.68
BASOPHILS # BLD AUTO: 0.02 K/UL (ref 0–0.2)
BASOPHILS NFR BLD: 0.5 % (ref 0–1.9)
BILIRUB SERPL-MCNC: 0.3 MG/DL (ref 0.1–1)
BSA FOR ECHO PROCEDURE: 1.93 M2
BUN SERPL-MCNC: 15 MG/DL (ref 8–23)
CALCIUM SERPL-MCNC: 8.6 MG/DL (ref 8.7–10.5)
CHLORIDE SERPL-SCNC: 100 MMOL/L (ref 95–110)
CO2 SERPL-SCNC: 32 MMOL/L (ref 23–29)
CREAT SERPL-MCNC: 0.8 MG/DL (ref 0.5–1.4)
CRP SERPL-MCNC: 0.7 MG/L (ref 0–8.2)
CV ECHO LV RWT: 0.28 CM
DIFFERENTIAL METHOD: ABNORMAL
DOP CALC AO PEAK VEL: 1.39 M/S
DOP CALC AO VTI: 33.47 CM
DOP CALC LVOT AREA: 3.5 CM2
DOP CALC LVOT DIAMETER: 2.12 CM
DOP CALC LVOT PEAK VEL: 0.95 M/S
DOP CALC LVOT STROKE VOLUME: 89.37 CM3
DOP CALCLVOT PEAK VEL VTI: 25.33 CM
E WAVE DECELERATION TIME: 243.4 MSEC
E/A RATIO: 1.41
E/E' RATIO: 10 M/S
ECHO LV POSTERIOR WALL: 0.7 CM (ref 0.6–1.1)
EOSINOPHIL # BLD AUTO: 0.2 K/UL (ref 0–0.5)
EOSINOPHIL NFR BLD: 5.4 % (ref 0–8)
ERYTHROCYTE [DISTWIDTH] IN BLOOD BY AUTOMATED COUNT: 13.5 % (ref 11.5–14.5)
ERYTHROCYTE [SEDIMENTATION RATE] IN BLOOD BY WESTERGREN METHOD: 11 MM/HR (ref 0–36)
EST. GFR  (AFRICAN AMERICAN): >60 ML/MIN/1.73 M^2
EST. GFR  (NON AFRICAN AMERICAN): >60 ML/MIN/1.73 M^2
FRACTIONAL SHORTENING: 36 % (ref 28–44)
GLUCOSE SERPL-MCNC: 87 MG/DL (ref 70–110)
HCT VFR BLD AUTO: 31.3 % (ref 37–48.5)
HGB BLD-MCNC: 9.5 G/DL (ref 12–16)
IMM GRANULOCYTES # BLD AUTO: 0.01 K/UL (ref 0–0.04)
IMM GRANULOCYTES NFR BLD AUTO: 0.2 % (ref 0–0.5)
INTERVENTRICULAR SEPTUM: 0.57 CM (ref 0.6–1.1)
IVRT: 0.11 MSEC
LA MAJOR: 6.28 CM
LA MINOR: 6.31 CM
LA WIDTH: 5.26 CM
LEFT ATRIUM SIZE: 4.48 CM
LEFT ATRIUM VOLUME INDEX: 66.7 ML/M2
LEFT ATRIUM VOLUME: 126.09 CM3
LEFT INTERNAL DIMENSION IN SYSTOLE: 3.25 CM (ref 2.1–4)
LEFT VENTRICLE DIASTOLIC VOLUME INDEX: 63.77 ML/M2
LEFT VENTRICLE DIASTOLIC VOLUME: 120.63 ML
LEFT VENTRICLE MASS INDEX: 55 G/M2
LEFT VENTRICLE SYSTOLIC VOLUME INDEX: 22.5 ML/M2
LEFT VENTRICLE SYSTOLIC VOLUME: 42.62 ML
LEFT VENTRICULAR INTERNAL DIMENSION IN DIASTOLE: 5.04 CM (ref 3.5–6)
LEFT VENTRICULAR MASS: 103.12 G
LV LATERAL E/E' RATIO: 8.18 M/S
LV SEPTAL E/E' RATIO: 12.86 M/S
LYMPHOCYTES # BLD AUTO: 1.8 K/UL (ref 1–4.8)
LYMPHOCYTES NFR BLD: 40.8 % (ref 18–48)
MAGNESIUM SERPL-MCNC: 1.9 MG/DL (ref 1.6–2.6)
MCH RBC QN AUTO: 27.1 PG (ref 27–31)
MCHC RBC AUTO-ENTMCNC: 30.4 G/DL (ref 32–36)
MCV RBC AUTO: 89 FL (ref 82–98)
MONOCYTES # BLD AUTO: 0.5 K/UL (ref 0.3–1)
MONOCYTES NFR BLD: 11 % (ref 4–15)
MV PEAK A VEL: 0.64 M/S
MV PEAK E VEL: 0.9 M/S
NEUTROPHILS # BLD AUTO: 1.9 K/UL (ref 1.8–7.7)
NEUTROPHILS NFR BLD: 42.1 % (ref 38–73)
NRBC BLD-RTO: 0 /100 WBC
PHOSPHATE SERPL-MCNC: 3.2 MG/DL (ref 2.7–4.5)
PISA TR MAX VEL: 2.74 M/S
PLATELET # BLD AUTO: 170 K/UL (ref 150–350)
PMV BLD AUTO: 10.9 FL (ref 9.2–12.9)
POTASSIUM SERPL-SCNC: 3.7 MMOL/L (ref 3.5–5.1)
PROT SERPL-MCNC: 5.8 G/DL (ref 6–8.4)
PULM VEIN S/D RATIO: 1.34
PV PEAK D VEL: 0.41 M/S
PV PEAK S VEL: 0.55 M/S
RA MAJOR: 5.65 CM
RA PRESSURE: 8 MMHG
RA WIDTH: 3.68 CM
RBC # BLD AUTO: 3.5 M/UL (ref 4–5.4)
RIGHT VENTRICULAR END-DIASTOLIC DIMENSION: 3.12 CM
RV TISSUE DOPPLER FREE WALL SYSTOLIC VELOCITY 1 (APICAL 4 CHAMBER VIEW): 10.34 CM/S
SINUS: 2.91 CM
SODIUM SERPL-SCNC: 140 MMOL/L (ref 136–145)
STJ: 2.43 CM
T4 FREE SERPL-MCNC: 0.97 NG/DL (ref 0.71–1.51)
TDI LATERAL: 0.11 M/S
TDI SEPTAL: 0.07 M/S
TDI: 0.09 M/S
TR MAX PG: 30 MMHG
TRICUSPID ANNULAR PLANE SYSTOLIC EXCURSION: 2.3 CM
TROPONIN I SERPL DL<=0.01 NG/ML-MCNC: <0.006 NG/ML (ref 0–0.03)
TSH SERPL DL<=0.005 MIU/L-ACNC: 6.57 UIU/ML (ref 0.4–4)
TV REST PULMONARY ARTERY PRESSURE: 38 MMHG
WBC # BLD AUTO: 4.44 K/UL (ref 3.9–12.7)

## 2020-01-28 PROCEDURE — 84443 ASSAY THYROID STIM HORMONE: CPT | Mod: HCNC

## 2020-01-28 PROCEDURE — 63600175 PHARM REV CODE 636 W HCPCS: Mod: HCNC | Performed by: STUDENT IN AN ORGANIZED HEALTH CARE EDUCATION/TRAINING PROGRAM

## 2020-01-28 PROCEDURE — S0030 INJECTION, METRONIDAZOLE: HCPCS | Mod: HCNC | Performed by: STUDENT IN AN ORGANIZED HEALTH CARE EDUCATION/TRAINING PROGRAM

## 2020-01-28 PROCEDURE — 84484 ASSAY OF TROPONIN QUANT: CPT | Mod: HCNC

## 2020-01-28 PROCEDURE — 93010 ELECTROCARDIOGRAM REPORT: CPT | Mod: HCNC,,, | Performed by: INTERNAL MEDICINE

## 2020-01-28 PROCEDURE — 99223 PR INITIAL HOSPITAL CARE,LEVL III: ICD-10-PCS | Mod: HCNC,AI,GC, | Performed by: HOSPITALIST

## 2020-01-28 PROCEDURE — 80053 COMPREHEN METABOLIC PANEL: CPT | Mod: HCNC

## 2020-01-28 PROCEDURE — 84100 ASSAY OF PHOSPHORUS: CPT | Mod: HCNC

## 2020-01-28 PROCEDURE — 25000003 PHARM REV CODE 250: Mod: HCNC | Performed by: STUDENT IN AN ORGANIZED HEALTH CARE EDUCATION/TRAINING PROGRAM

## 2020-01-28 PROCEDURE — 84439 ASSAY OF FREE THYROXINE: CPT | Mod: HCNC

## 2020-01-28 PROCEDURE — 93005 ELECTROCARDIOGRAM TRACING: CPT | Mod: HCNC

## 2020-01-28 PROCEDURE — 86140 C-REACTIVE PROTEIN: CPT | Mod: HCNC

## 2020-01-28 PROCEDURE — 25500020 PHARM REV CODE 255: Mod: HCNC | Performed by: STUDENT IN AN ORGANIZED HEALTH CARE EDUCATION/TRAINING PROGRAM

## 2020-01-28 PROCEDURE — 85025 COMPLETE CBC W/AUTO DIFF WBC: CPT | Mod: HCNC

## 2020-01-28 PROCEDURE — 36415 COLL VENOUS BLD VENIPUNCTURE: CPT | Mod: HCNC

## 2020-01-28 PROCEDURE — 99223 1ST HOSP IP/OBS HIGH 75: CPT | Mod: HCNC,AI,GC, | Performed by: HOSPITALIST

## 2020-01-28 PROCEDURE — 11000001 HC ACUTE MED/SURG PRIVATE ROOM: Mod: HCNC

## 2020-01-28 PROCEDURE — 83735 ASSAY OF MAGNESIUM: CPT | Mod: HCNC

## 2020-01-28 PROCEDURE — 93010 EKG 12-LEAD: ICD-10-PCS | Mod: HCNC,,, | Performed by: INTERNAL MEDICINE

## 2020-01-28 PROCEDURE — C9113 INJ PANTOPRAZOLE SODIUM, VIA: HCPCS | Mod: HCNC | Performed by: STUDENT IN AN ORGANIZED HEALTH CARE EDUCATION/TRAINING PROGRAM

## 2020-01-28 PROCEDURE — 97162 PT EVAL MOD COMPLEX 30 MIN: CPT | Mod: HCNC

## 2020-01-28 PROCEDURE — 85652 RBC SED RATE AUTOMATED: CPT | Mod: HCNC

## 2020-01-28 RX ORDER — OXYBUTYNIN CHLORIDE 15 MG/1
15 TABLET, EXTENDED RELEASE ORAL DAILY
COMMUNITY
End: 2020-05-28 | Stop reason: SDUPTHER

## 2020-01-28 RX ORDER — DEXTROSE MONOHYDRATE AND SODIUM CHLORIDE 5; .9 G/100ML; G/100ML
INJECTION, SOLUTION INTRAVENOUS CONTINUOUS
Status: ACTIVE | OUTPATIENT
Start: 2020-01-28 | End: 2020-01-29

## 2020-01-28 RX ORDER — SODIUM CHLORIDE 0.9 % (FLUSH) 0.9 %
10 SYRINGE (ML) INJECTION
Status: DISCONTINUED | OUTPATIENT
Start: 2020-01-28 | End: 2020-01-31 | Stop reason: HOSPADM

## 2020-01-28 RX ORDER — LORAZEPAM 2 MG/ML
0.5 INJECTION INTRAMUSCULAR EVERY 4 HOURS PRN
Status: DISCONTINUED | OUTPATIENT
Start: 2020-01-28 | End: 2020-01-28

## 2020-01-28 RX ORDER — LORAZEPAM 2 MG/ML
0.5 INJECTION INTRAMUSCULAR EVERY 6 HOURS PRN
Status: DISCONTINUED | OUTPATIENT
Start: 2020-01-28 | End: 2020-01-30

## 2020-01-28 RX ADMIN — PANTOPRAZOLE SODIUM 40 MG: 40 INJECTION, POWDER, FOR SOLUTION INTRAVENOUS at 10:01

## 2020-01-28 RX ADMIN — MORPHINE SULFATE 4 MG: 4 INJECTION, SOLUTION INTRAMUSCULAR; INTRAVENOUS at 12:01

## 2020-01-28 RX ADMIN — LORAZEPAM 0.5 MG: 2 INJECTION INTRAMUSCULAR; INTRAVENOUS at 05:01

## 2020-01-28 RX ADMIN — DIATRIZOATE MEGLUMINE AND DIATRIZOATE SODIUM 50 ML: 660; 100 LIQUID ORAL; RECTAL at 03:01

## 2020-01-28 RX ADMIN — ENOXAPARIN SODIUM 40 MG: 100 INJECTION SUBCUTANEOUS at 05:01

## 2020-01-28 RX ADMIN — DEXTROSE AND SODIUM CHLORIDE: 5; .9 INJECTION, SOLUTION INTRAVENOUS at 05:01

## 2020-01-28 RX ADMIN — MORPHINE SULFATE 4 MG: 4 INJECTION, SOLUTION INTRAMUSCULAR; INTRAVENOUS at 03:01

## 2020-01-28 RX ADMIN — LORAZEPAM 0.5 MG: 2 INJECTION INTRAMUSCULAR; INTRAVENOUS at 11:01

## 2020-01-28 RX ADMIN — METRONIDAZOLE 500 MG: 500 INJECTION, SOLUTION INTRAVENOUS at 06:01

## 2020-01-28 RX ADMIN — MORPHINE SULFATE 4 MG: 4 INJECTION, SOLUTION INTRAMUSCULAR; INTRAVENOUS at 11:01

## 2020-01-28 RX ADMIN — METRONIDAZOLE 500 MG: 500 INJECTION, SOLUTION INTRAVENOUS at 05:01

## 2020-01-28 RX ADMIN — PROMETHAZINE HYDROCHLORIDE 6.25 MG: 25 INJECTION INTRAMUSCULAR; INTRAVENOUS at 07:01

## 2020-01-28 RX ADMIN — CEFTRIAXONE 2 G: 2 INJECTION, SOLUTION INTRAVENOUS at 09:01

## 2020-01-28 RX ADMIN — MORPHINE SULFATE 4 MG: 4 INJECTION, SOLUTION INTRAMUSCULAR; INTRAVENOUS at 05:01

## 2020-01-28 RX ADMIN — METRONIDAZOLE 500 MG: 500 INJECTION, SOLUTION INTRAVENOUS at 10:01

## 2020-01-28 NOTE — PHARMACY MED REC
"Admission Medication Reconciliation - Pharmacy Consult Note    The home medication history was taken by Lily Hill, Pharmacy Technician. Based on information gathered and subsequent review by the clinical pharmacist, the items below may need attention.    You may go to "Admission" then "Reconcile Home Medications" tabs to review and/or act upon these items.    No issues noted with the medication reconciliation.  --holding several medications given patient's NPO status    Please address this information as you see fit.  Feel free to contact us if you have any questions or require assistance.    Lauren Romo, PharmD, BCPS  [id number]     PTA Medications   Medication Sig    aspirin (ECOTRIN) 81 MG EC tablet Take 1 tablet (81 mg total) by mouth once daily.    atorvastatin (LIPITOR) 80 MG tablet TAKE 1 TABLET BY MOUTH DAILY    butalbital-acetaminophen-caffeine -40 mg (FIORICET, ESGIC) -40 mg per tablet Take 1 tablet by mouth every 4 (four) hours as needed for Headaches.     docusate sodium (COLACE) 100 MG capsule Take 1 capsule (100 mg total) by mouth 3 (three) times daily as needed for constipation. (Patient taking differently: Take 300 mg by mouth every evening. )    FLUoxetine 20 MG capsule Take 3 capsules (60 mg total) by mouth once daily.    furosemide (LASIX) 40 MG tablet Take 1 tablet (40 mg total) by mouth once daily. (Patient taking differently: Take 80 mg by mouth every morning. )    HYDROcodone-acetaminophen (NORCO)  mg per tablet Take 1 tablet by mouth every 6 (six) hours as needed for Pain.    intrathecal pain pump compound Hydromorphone (7.5 mg/mL) infusion at 3.6799 mg/day (0.1533 mg/hr) out of a total reservoir volume of 37.3 mL    levothyroxine (SYNTHROID) 125 MCG tablet Take 1 tablet (125 mcg total) by mouth before breakfast.    lidocaine (LIDODERM) 5 % Place 1 patch onto the skin daily as needed (pain). USE AS DIRECTED WEAR FOR A MAXIMUM OF 12 HOURS    oxybutynin (DITROPAN " XL) 15 MG TR24 Take 15 mg by mouth once daily.    pantoprazole (PROTONIX) 40 MG tablet Take 1 tablet (40 mg total) by mouth once daily.    potassium citrate (UROCIT-K) 10 mEq (1,080 mg) TbSR Take 2 tablets (20 mEq total) by mouth as needed. (Patient taking differently: Take 10 mEq by mouth once daily. )    promethazine (PHENERGAN) 25 MG tablet Take 25 mg by mouth every 6 (six) hours as needed for Nausea.    QUEtiapine (SEROQUEL) 100 MG Tab TAKE 1 TABLET (100 MG TOTAL) BY MOUTH EVERY EVENING.    SENNOSIDES (SENNA LAX ORAL) Take 20 mg by mouth every evening.     traZODone (DESYREL) 100 MG tablet Take 3 tablets (300 mg total) by mouth every evening.     .    .

## 2020-01-28 NOTE — ED NOTES
Report attempted, REJI Weiner will call me back. Report given and accepted by REJI Weiner and tele box # [id number] will be placed and confirmed. Will arrange transport.

## 2020-01-28 NOTE — PROGRESS NOTES
[facility]  General Surgery  Progress Note    Subjective:     Post-Op Info:  * No surgery found *         Interval History:   No acute events overnight. Afebrile. VSS. Reporting ongoing abdominal pain this morning. Denies flatus of BM. No nausea or vomiting. Given gastrografin at 3:20 AM. Due for first abdominal plain film now.      Medications:  Continuous Infusions:   dextrose 5 % and 0.9 % NaCl 75 mL/hr at 01/27/20 8903     Scheduled Meds:   cefTRIAXone (ROCEPHIN) IVPB  2 g Intravenous Q24H    enoxaparin  40 mg Subcutaneous Daily    metronidazole  500 mg Intravenous Q8H    pantoprazole  40 mg Intravenous Daily     PRN Meds:albuterol-ipratropium, atropine, bisacodyl, Dextrose 10% Bolus, Dextrose 10% Bolus, glucagon (human recombinant), glucose, glucose, lidocaine, morphine, naloxone, promethazine (PHENERGAN) IVPB, sodium chloride 0.9%     Review of patient's allergies indicates:   Allergen Reactions    (d)-limonene flavor      Other reaction(s): difficult intubation  Other reaction(s): Difficulty breathing    Bactrim [sulfamethoxazole-trimethoprim] Anaphylaxis    Benadryl [diphenhydramine hcl] Shortness Of Breath    Imitrex [sumatriptan succinate] Shortness Of Breath    Topamax [topiramate] Shortness Of Breath    Vancomycin Shortness Of Breath     Rash    Butorphanol tartrate     Darvocet a500 [propoxyphene n-acetaminophen]      Other reaction(s): Difficulty breathing    Fentanyl      Other reaction(s): Vomiting  Other reaction(s): Nausea  Other reaction(s): Itching  swelling    White petrolatum-zinc     Zinc oxide-white petrolatum      Other reaction(s): Difficulty breathing    Latex, natural rubber Itching and Rash    Phenytoin Rash and Other (See Comments)     Trouble breathing     Objective:     Vital Signs (Most Recent):  Temp: 98 °F (36.7 °C) (01/27/20 1850)  Pulse: (!) 48 (01/28/20 0730)  Resp: 14 (01/28/20 0730)  BP: (!) 92/51 (01/28/20 0730)  SpO2: (!) 92 % (01/28/20  0730) Vital Signs (24h Range):  Temp:  [97.4 °F (36.3 °C)-98 °F (36.7 °C)] 98 °F (36.7 °C)  Pulse:  [42-59] 48  Resp:  [12-20] 14  SpO2:  [92 %-98 %] 92 %  BP: ()/(41-67) 92/51     Weight: 81.7 kg (180 lb 1.9 oz)  Body mass index is 29.97 kg/m².    Intake/Output - Last 3 Shifts       01/26 0700 - 01/27 0659 01/27 0700 - 01/28 0659 01/28 0700 - 01/29 0659    IV Piggyback  150     Total Intake(mL/kg)  150 (1.8)     Urine (mL/kg/hr)  1350     Total Output  1350     Net  -1200                  Physical Exam   Constitutional: She is oriented to person, place, and time.   Lying in hospital bed  Appears mildly uncomfortable  No diaphoresis   HENT:   Head: Normocephalic and atraumatic.   Eyes: EOM are normal.   Neck: Normal range of motion.   Cardiovascular: Normal rate, regular rhythm and intact distal pulses.   Pulmonary/Chest: Effort normal. No respiratory distress. She has no wheezes.   Abdominal:   Soft  Mild distention  Generalized abdominal tenderness - worse in bilateral lower lower quadrant  No rebound  Some voluntary guarding but distractible  No peritoneal signs   Musculoskeletal: She exhibits no edema or deformity.   Neurological: She is alert and oriented to person, place, and time.   Skin: Skin is warm and dry. No rash noted. No erythema.   Psychiatric: She has a normal mood and affect. Her behavior is normal.   Nursing note and vitals reviewed.      Significant Labs:  CBC:   Recent Labs   Lab 01/28/20  0248   WBC 4.44   RBC 3.50*   HGB 9.5*   HCT 31.3*      MCV 89   MCH 27.1   MCHC 30.4*     CMP:   Recent Labs   Lab 01/28/20  0248   GLU 87   CALCIUM 8.6*   ALBUMIN 3.1*   PROT 5.8*      K 3.7   CO2 32*      BUN 15   CREATININE 0.8   ALKPHOS 87   ALT 7*   AST 12   BILITOT 0.3       Significant Diagnostics:  Pending    Assessment/Plan:     * Partial small bowel obstruction  65 y/o female with possible pSBO    - Follow up abdominal plain film series to monitor contrast transit  - Serial  abdominal exams  - Remainder of care per primary team      Rubin Mendez MD  Surgery Resident, PGY-V  Pager: [phone]  1/28/2020 7:48 AM

## 2020-01-28 NOTE — ASSESSMENT & PLAN NOTE
- Chronic issue that patient has had for years  - Scheduled to see vascular for this on 1/30  - although tender to palpation, physical exam significant for bilateral swelling and normal BNP  - Will hold off on US lower extremity for now, one was done 1 year ago without findings, patient reports no worsening of symptoms since then.

## 2020-01-28 NOTE — PT/OT/SLP EVAL
Physical Therapy Evaluation    Patient Name:  Tasha Hawley   MRN:  [id number]    Recommendations:     Discharge Recommendations:  nursing facility, skilled   Discharge Equipment Recommendations: none   Barriers to discharge: Inaccessible home, Decreased caregiver support and high risk of falls    Assessment:     Tasha Hawley is a 64 y.o. female admitted with a medical diagnosis of Partial small bowel obstruction.  She presents with the following impairments/functional limitations:  weakness, gait instability, decreased upper extremity function, impaired cardiopulmonary response to activity, impaired endurance, impaired balance, decreased lower extremity function, decreased safety awareness, impaired self care skills, pain, edema, impaired functional mobilty, decreased ROM. Patient agreeable to participate in therapy evaluation, however, session limited due to significant pain in L abdomen and lower back. Patient demonstrated hypotension and bradycardia throughout session, but asymptomatic. Patient was able to sit at edge of bed with stand by assistance with good postural control, and she was able to perform a partial stand with minimum assistance. She demonstrated decreased sitting and standing tolerance due to pain in abdomen and lower back. She is a good candidate for skilled nursing placement in order to address the above deficits and return to functional baseline.    Rehab Prognosis: Good; patient would benefit from acute skilled PT services to address these deficits and reach maximum level of function.    Recent Surgery: * No surgery found *      Plan:     During this hospitalization, patient to be seen 3 x/week to address the identified rehab impairments via gait training, therapeutic activities, therapeutic exercises, neuromuscular re-education and progress toward the following goals:    · Plan of Care Expires:  02/28/20    Subjective     Chief Complaint: Back and abdomen pain   Patient/Family  Comments/goals: to decrease pain  Pain/Comfort:  · Pain Rating 1: 9/10  · Location - Side 1: Left  · Location - Orientation 1: generalized  · Location 1: abdomen  · Pain Addressed 1: Reposition, Distraction, Nurse notified, Cessation of Activity  · Pain Rating Post-Intervention 1: 9/10  · Pain Rating 2: 9/10  · Location - Side 2: Bilateral  · Location - Orientation 2: lower  · Location 2: back  · Pain Addressed 2: Cessation of Activity, Nurse notified, Distraction, Reposition  · Pain Rating Post-Intervention 2: 9/10    Patients cultural, spiritual, Restorationist conflicts given the current situation: no    Living Environment:  Prior to admission, patient was living with her  in a [facility] with 0 BRENDA. Her bathroom has a tub/shower combination set-up with shower chair and commode. She has been on disability for a long time, and her  is retired. She is no longer driving and states that her  is available at home to assist her 24/7. She states she was using a rollator to ambulate inside and outside the home.  Prior to admission, patients level of function was modified independent for ADLs and requiring assistance from  with IADLs.  Equipment used at home: walker, rolling, power chair, bath bench, shower chair, medication pump, bedside commode.  DME owned (not currently used): none.  Upon discharge, patient will have assistance from .    Objective:     Communicated with RN prior to session.  Patient found HOB elevated with pulse ox (continuous), telemetry, peripheral IV, oxygen(suprapubic catheter, 2.5L)  upon PT entry to room. Clinical instructor present and directly supervising therapy session.  Blood pressure: 90/47 with HOB elevated before mobilization, 92/45 seated EOB for ~2 minutes\  HR: 39 bpm with HOB elevated before mobilization, 40-47 bpm seated EOB  O2 sat: 100% sitting EOB    General Precautions: Standard, fall, NPO   Orthopedic Precautions:N/A   Braces: N/A     Exams:    Cognitive  Exam  Patient is A&O x4 and follows 100% of one -step commands    Fine Motor Coordination   -       WFL based on gross functional assessment     Postural Exam Patient presented with the following abnormalities:    -       Rounded shoulders  -       Forward head  -       Kyphosis  -       Posterior pelvic tilt   Sensation    -       Light touch grossly intact   Skin Integrity/Edema     -       Skin integrity: visibly intact  -       Edema: moderate non-pitting edema bilateral lower legs   R LE ROM Decreased hip flexion 2/2 pain and decreased ankle dorsiflexion 2/2 edema  *tested in supine   R LE Strength 3/5 hip flexion, knee ext/flex, and ankle DF/PF  *tested in supine   L LE ROM Decreased hip flexion 2/2 pain and decreased ankle dorsiflexion 2/2 edema  *tested in supine   L LE Strength  3/5 hip flexion, knee ext/flex, and ankle DF/PF  *tested in supine       Functional Mobility:    Bed Mobility  Rolling to L: contact guard assistance  Supine to Sit on the L side:  contact guard assist  Abdominal crunch strategy  Sit to supine on the L side: contact guard assist  Scoot to HOB in supine: contact guard assist  Scoot to EOB in sitting: contact guard assist   Transfers Sit to Stand:  partial stand and scoot to L at EOB minimum assistance bilateral HHA  Description: flexed through hips, unable to stand tall 2/2 pain, leaning anteriorly on PT   Gait  Deferred 2/2 pain and vitals       Therapeutic Activities and Exercises:   Patient not appropriate to transfer with RN assist at this time. Whiteboard updated.  Patient educated on:  -Use of call button to call for RN assistance  -Encouraged to mobilize OOB only with RN assist  -Role and goals of PT and plan of care during hospitalization    Patient verbalized understanding. In agreement with plan of care.       AM-PAC 6 CLICK MOBILITY  Total Score:14     Patient left HOB elevated with all lines intact, call button in reach, bed alarm on and RN present.    GOALS:    Multidisciplinary Problems     Physical Therapy Goals        Problem: Physical Therapy Goal    Goal Priority Disciplines Outcome Goal Variances Interventions   Physical Therapy Goal     PT, PT/OT Ongoing, Progressing     Description:  Goals to be met by: 2020     Patient will increase functional independence with mobility by performin. Supine to sit with Stand-by Assistance  2. Sit to supine with Stand-by Assistance  3. Rolling to Left/Right with Supervision.  4. Sit to stand transfer with Contact Guard Assistance and rolling walker  5. Bed to chair transfer with Minimal Assistance using Rolling Walker  6. Gait  x 10 feet with Contact Guard Assistance using Rolling Walker.                       History:     Past Medical History:   Diagnosis Date    Anticoagulant long-term use     Anxiety     Arthritis     Bilateral lower extremity edema     severe chronic    Carotid artery occlusion     Cataract     Coronary artery disease     subtotalled LAD with collateral    Depression     Fever blister     Hypothyroid     Iron deficiency anemia     Lumbar radiculopathy     with chronic pain    Ocular migraine     Sleep apnea     cpap       Past Surgical History:   Procedure Laterality Date    ADENOIDECTOMY      BACK SURGERY      x 12    CARDIAC CATHETERIZATION  2016    subtotalled LAD with right to left collaterals    CATARACT EXTRACTION W/  INTRAOCULAR LENS IMPLANT Left     Dr Coleman     CYSTOSCOPIC LITHOLAPAXY N/A 2019    Procedure: CYSTOLITHOLAPAXY;  Surgeon: Shireen Mayo MD;  Location: [address];  Service: Urology;  Laterality: N/A;    CYSTOSCOPIC LITHOLAPAXY N/A 9/3/2019    Procedure: CYSTOLITHOLAPAXY;  Surgeon: Shireen Mayo MD;  Location: [address];  Service: Urology;  Laterality: N/A;    ESOPHAGOGASTRODUODENOSCOPY N/A 2018    Procedure: ESOPHAGOGASTRODUODENOSCOPY (EGD);  Surgeon: Prince Vance MD;  Location: [facility]);  Service: Endoscopy;   Laterality: N/A;  r/s 'd per Dr. Vance due to family emergency- ER    HYSTERECTOMY  1975    endometriosis    pain pump placement      SQ Dilaudid Pump managed by Dr. Hillman, Pain Management    REPLACEMENT OF CATHETER N/A 10/31/2019    Procedure: REPLACEMENT, CATHETER-SUPRAPUBIC;  Surgeon: Shireen Mayo MD;  Location: [facility] OR [address] [place] [address];  Service: Urology;  Laterality: N/A;    SPINAL CORD STIMULATOR REMOVAL      before Larissa    SPINE SURGERY  5-13-13    CERVICAL FUSION    TONSILLECTOMY         Time Tracking:     PT Received On: 01/28/20  PT Start Time: 0951     PT Stop Time: 1012  PT Total Time (min): 21 min     Billable Minutes: Evaluation 21      ANGELITA Vega  01/28/2020

## 2020-01-28 NOTE — PLAN OF CARE
01/28/20 1240   Discharge Assessment   Assessment Type Discharge Planning Assessment   Confirmed/corrected address and phone number on facesheet? Yes   Assessment information obtained from? Patient   Expected Length of Stay (days) 4   Communicated expected length of stay with patient/caregiver yes   Prior to hospitilization cognitive status: Alert/Oriented   Prior to hospitalization functional status: Assistive Equipment   Current cognitive status: Alert/Oriented   Current Functional Status: Needs Assistance   Lives With spouse  (Dangelo Hawley ([phone]))   Able to Return to Prior Arrangements yes   Is patient able to care for self after discharge? Unable to determine at this time (comments)   Patient's perception of discharge disposition home health  (pt current w/[facility])   Readmission Within the Last 30 Days no previous admission in last 30 days   Patient currently being followed by outpatient case management? No   Patient currently receives any other outside agency services? No   Equipment Currently Used at Home bedside commode;bath bench;walker, rolling;medication pump;power chair;other (see comments)  (BP machine)   Do you have any problems affording any of your prescribed medications? No   Is the patient taking medications as prescribed? yes   Does the patient have transportation home? Yes   Transportation Anticipated family or friend will provide  (spouse)   Does the patient receive services at the [facility]? No   Discharge Plan A Home Health  ([facility])   Discharge Plan B Skilled Nursing Facility   DME Needed Upon Discharge  other (see comments)  (tbd)   Patient/Family in Agreement with Plan yes     Dx: partial SBO  Consult: PT/OT  Pharm: [facility]  Hosp f/u appt: Dr. Mesfin Hodges (PCP) on 2/6/2020 at 1020    CM informed Kelly ([phone]) w/[facility] of patient's hospitalization. Kelly stated that the patient was only receiving NSG & requested that HH orders be faxed to (f)  [phone] at time of discharge. Will continue to follow.

## 2020-01-28 NOTE — ED NOTES
Patient requesting sleep meds, team called and they are unable to RX tonight as she is going to have po gastrograffin and imaging and needs to be npo.

## 2020-01-28 NOTE — ASSESSMENT & PLAN NOTE
Patient with reports of new onset bradycardia and accompanying hypotension. However, per chart review, numerous clinic visits have documented vitals with bradycardia (pulses between 45-55) and BP of 90's/50-60's. Previous echo done in February of 2019 without concerning findings. Patient is hemodynamically stable at time of exam. Electrolytes wnl.     - Patient due to get echo on 1/30, will order on current hospital admission  - Will order prn atropine for hemodynamic instability

## 2020-01-28 NOTE — PLAN OF CARE
Problem: Physical Therapy Goal  Goal: Physical Therapy Goal  Description  Goals to be met by: 2020     Patient will increase functional independence with mobility by performin. Supine to sit with Stand-by Assistance  2. Sit to supine with Stand-by Assistance  3. Rolling to Left/Right with Supervision.  4. Sit to stand transfer with Contact Guard Assistance and rolling walker  5. Bed to chair transfer with Minimal Assistance using Rolling Walker  6. Gait  x 10 feet with Contact Guard Assistance using Rolling Walker.      Outcome: Ongoing, Progressing   Established plan of care. Goals created based on patient's current functional mobility status.     Tracey Morel, SPT

## 2020-01-28 NOTE — ASSESSMENT & PLAN NOTE
63 y/o female with possible pSBO    - Follow up abdominal plain film series to monitor contrast transit  - Serial abdominal exams  - Remainder of care per primary team

## 2020-01-28 NOTE — PT/OT/SLP PROGRESS
Occupational Therapy      Patient Name:  Tasha Hawley   MRN:  [id number]    Patient not seen today secondary to Other (NA x 3 attempts: upon initial attempt - pt working with PT and experienced drop in BP; x 2 attempts in the PM - patient out of room for echo). Will follow-up per schedule.    Susi Man, OT  1/28/2020

## 2020-01-28 NOTE — SUBJECTIVE & OBJECTIVE
Past Medical History:   Diagnosis Date    Anticoagulant long-term use     Anxiety     Arthritis     Bilateral lower extremity edema     severe chronic    Carotid artery occlusion     Cataract     Coronary artery disease     subtotalled LAD with collateral    Depression     Fever blister     Hypothyroid     Iron deficiency anemia     Lumbar radiculopathy     with chronic pain    Ocular migraine     Sleep apnea     cpap       Past Surgical History:   Procedure Laterality Date    ADENOIDECTOMY      BACK SURGERY      x 12    CARDIAC CATHETERIZATION  2016    subtotalled LAD with right to left collaterals    CATARACT EXTRACTION W/  INTRAOCULAR LENS IMPLANT Left     Dr Coleman     CYSTOSCOPIC LITHOLAPAXY N/A 6/27/2019    Procedure: CYSTOLITHOLAPAXY;  Surgeon: Shireen Mayo MD;  Location: [facility] OR 2ND FLR;  Service: Urology;  Laterality: N/A;    CYSTOSCOPIC LITHOLAPAXY N/A 9/3/2019    Procedure: CYSTOLITHOLAPAXY;  Surgeon: Shireen Mayo MD;  Location: [facility] OR 2ND FLR;  Service: Urology;  Laterality: N/A;    ESOPHAGOGASTRODUODENOSCOPY N/A 5/23/2018    Procedure: ESOPHAGOGASTRODUODENOSCOPY (EGD);  Surgeon: Prince Vance MD;  Location: [facility] (4[facility]);  Service: Endoscopy;  Laterality: N/A;  r/s 'd per Dr. Vance due to family emergency- ER    HYSTERECTOMY  1975    endometriosis    pain pump placement      SQ Dilaudid Pump managed by Dr. Hillman, Pain Management    REPLACEMENT OF CATHETER N/A 10/31/2019    Procedure: REPLACEMENT, CATHETER-SUPRAPUBIC;  Surgeon: Shireen Mayo MD;  Location: [facility] OR 1ST FLR;  Service: Urology;  Laterality: N/A;    SPINAL CORD STIMULATOR REMOVAL      before Larissa    SPINE SURGERY  5-13-13    CERVICAL FUSION    TONSILLECTOMY         Review of patient's allergies indicates:   Allergen Reactions    (d)-limonene flavor      Other reaction(s): difficult intubation  Other reaction(s): Difficulty breathing    Bactrim [sulfamethoxazole-trimethoprim]  Anaphylaxis    Benadryl [diphenhydramine hcl] Shortness Of Breath    Imitrex [sumatriptan succinate] Shortness Of Breath    Topamax [topiramate] Shortness Of Breath    Vancomycin Shortness Of Breath     Rash    Butorphanol tartrate     Darvocet a500 [propoxyphene n-acetaminophen]      Other reaction(s): Difficulty breathing    Fentanyl      Other reaction(s): Vomiting  Other reaction(s): Nausea  Other reaction(s): Itching  swelling    White petrolatum-zinc     Zinc oxide-white petrolatum      Other reaction(s): Difficulty breathing    Latex, natural rubber Itching and Rash    Phenytoin Rash and Other (See Comments)     Trouble breathing       Family History     Problem Relation (Age of Onset)    Cancer Mother (55), Father    Esophageal cancer Father    Heart disease Maternal Uncle    No Known Problems Brother, Brother, Sister, Maternal Aunt, Paternal Aunt, Paternal Uncle, Maternal Grandfather, Paternal Grandmother, Paternal Grandfather    Parkinsonism Maternal Grandmother    Tremor Maternal Grandmother        Tobacco Use    Smoking status: Never Smoker    Smokeless tobacco: Never Used   Substance and Sexual Activity    Alcohol use: Never     Frequency: Never    Drug use: No    Sexual activity: Never      Review of Systems   Constitutional: Positive for appetite change, chills and fatigue. Negative for fever and unexpected weight change.   HENT: Positive for hearing loss (mild bilateral hearing loss (chronic)). Negative for congestion and sore throat.    Respiratory: Positive for shortness of breath (occasional). Negative for cough and chest tightness.    Cardiovascular: Positive for chest pain (left lower) and leg swelling. Negative for palpitations.   Gastrointestinal: Positive for abdominal distention, abdominal pain (left sided), anal bleeding and constipation. Negative for blood in stool, diarrhea, nausea and vomiting.   Genitourinary: Positive for flank pain (left sided).   Musculoskeletal:  Positive for back pain. Negative for arthralgias and myalgias.   Skin: Negative for color change, pallor and rash.   Neurological: Positive for weakness and light-headedness. Negative for dizziness and headaches.   Psychiatric/Behavioral: The patient is nervous/anxious.      Objective:     Vital Signs (Most Recent):  Temp: 98 °F (36.7 °C) (01/27/20 1850)  Pulse: (!) 44 (01/27/20 1854)  Resp: 20 (01/27/20 1854)  BP: (!) 94/47 (01/27/20 1854)  SpO2: 96 % (01/27/20 1854) Vital Signs (24h Range):  Temp:  [97.4 °F (36.3 °C)-98 °F (36.7 °C)] 98 °F (36.7 °C)  Pulse:  [44-59] 44  Resp:  [16-20] 20  SpO2:  [95 %-98 %] 96 %  BP: ()/(47-57) 94/47   Weight: 82.1 kg (181 lb)  Body mass index is 30.12 kg/m².    No intake or output data in the 24 hours ending 01/27/20 2033    Physical Exam   Constitutional: She is oriented to person, place, and time. She appears well-developed and well-nourished. No distress.   HENT:   Head: Normocephalic and atraumatic.   Eyes: Pupils are equal, round, and reactive to light. Conjunctivae and EOM are normal. No scleral icterus.   Neck: Normal range of motion. Neck supple. No JVD present.   Cardiovascular: Regular rhythm, normal heart sounds and intact distal pulses. Exam reveals no friction rub.   No murmur heard.  bradycardic   Pulmonary/Chest: Effort normal and breath sounds normal. No respiratory distress. She has no wheezes. She has no rales.   Abdominal: Soft. Bowel sounds are normal. She exhibits no distension. There is tenderness (left upper and lower quadrant).   Musculoskeletal: Normal range of motion. She exhibits edema (bilateral lower extremity, non-pitting) and tenderness. She exhibits no deformity.   Neurological: She is alert and oriented to person, place, and time.   Skin: Skin is warm and dry.   Psychiatric: She has a normal mood and affect. Her behavior is normal.   Vitals reviewed.      Vents:     Lines/Drains/Airways     Drain                 Suprapubic Catheter 10/31/19  1317 100% silicone 20 Fr. 88 days          Peripheral Intravenous Line                 Peripheral IV - Single Lumen 01/27/20 1629 20 G;1 in Right Wrist less than 1 day              Significant Labs:    CBC/Anemia Profile:  Recent Labs   Lab 01/27/20  1425   WBC 5.80   HGB 10.8*   HCT 35.5*      MCV 89   RDW 13.3        Chemistries:  Recent Labs   Lab 01/27/20  1425      K 3.6   CL 96   CO2 31*   BUN 15   CREATININE 1.1   CALCIUM 9.5   ALBUMIN 4.0   PROT 7.3   BILITOT 0.3   ALKPHOS 111   ALT 12   AST 17       All pertinent labs within the past 24 hours have been reviewed.    Significant Imaging: I have reviewed all pertinent imaging results/findings within the past 24 hours.

## 2020-01-28 NOTE — ASSESSMENT & PLAN NOTE
Left lower quadrant abdominal pain    Patient with three days of worsening left sided abdominal pain with associated SOB. She reports being able to tolerate her normal PO diet without vomiting and that her fluid intake has been normal. She reports her usual constipation likely due to her chronic opioid use. No reports of melena, just occasional BRBPR due to known hemorrhoids. Although she has extensive back surgery, she denies any abdominal surgery. Initial lab work up shows grossly normal cbc without leukocytosis. CMP shows no major electrolyte derangements and normal LFT and bilirubin. CT scan of abdomen shows dilated distal small bowel loops noting slow flow through these loops. However findings were nonspecific, with differential including developing partial small bowel obstruction on the basis of adhesion versus nonspecific infectious or inflammatory enteritis. Also seen is a large amount of stool. Differential diagnosis includes inflammatory/infectious enteritis, ileus due to chronic opioid use, or adhesion.     - Will keep NPO for now, per surgery no NG tube decompression. Plan for gastrografin challenge and serial abdominal exams  - Will treat prophylacticly with rocephin and flagyl  - Patient takes large amount of laxatives at home, will give prn suppository, but may need to be escalated to lactulose enema.   - Will hold PO meds for now  -  Prn IV phenergan for nausea, cQTC 477 on admit ECG  - Maintenance fluid with D5 normal saline at 75 ml/hr for 10 hours

## 2020-01-28 NOTE — MEDICAL/APP STUDENT
Hospital Medicine  Progress note    Team: [facility] [place] HOSP MED 1 Anand Lloyd  Admit Date: 1/27/2020  JACKIE 1/31/2020  Length of Stay:  LOS: 1 day   Code status: Full Code    Principal Problem:  Partial small bowel obstruction    HPI / Hospital Course (per admitting resident):   Patient is a 64 year old female with CAD, hypothyroid, sleep apnea, arthritis, neurogenic bladder with meadows at home (changed every 3 weeks), and chronic back pain since a work accident in 1997 (has had 12 back surgeries) on dilaudid pump who presents to hospital with complaints of back pain; also complains of shortness of breath. She reports that her pain started 3 days ago without particular inciting incident. She sat down as she felt a sharp pain followed by shortness of breath. At the time, she took her pulse and blood pressure; she does this at home to make sure she is tolerating her dilaudid pump. She found that she was hypotensive at 90's/60's and was bradycardic with pulse between 40-50. Initially she waited to see if the pain would resolve but it continued to get worse. She finally came to the hospital when her home health nurse took her vitals and noted the bradycardia. Of note, patient was treated for UTI that occurred 2 weeks ago; she finished a 7 days course of antibiotics (initially treated with amoxicillin but switched to omnicef due to previous resistant proteus). In addition, she was to follow up with cardiology on the 30th with echo as well as meet with vascular surgery for her chronic leg swelling. Her previous echo in February of 2019 had normal systolic and diastolic function. She also has recently had a 10% increase to her given dilaudid dose; now gets 3.67 mg daily.      In ED, her cbc showed a mild anemia of 10.8 (chronic problem) and no leukocytosis. CMP was grossly normal. Cardiac work up was negative with a negative BNP, mild increase in troponin to .033, and ECG showing sinus bradycardia. Her urine was negative for UTI,  "lipase and lactic acid wnl. Imaging was significant for a CT abdomen which showed nonspecific findings , with differential including developing partial small bowel obstruction on the basis of adhesion versus nonspecific infectious or inflammatory enteritis. At this point patient had already been seen by general surgery who recommend non-operative management for now, and gastrografin scan; they recommend against NG tube as well.      At the time of my exam, patient endorses some mild light headedness, lower chest pain, left sided abdominal pain, diffuse back pain, leg swelling, constipation,  hematuria (chronic problem), occasional FRBPR due to hemorrhoids and chills. She denies dizziness, headaches, shortness of breath, cough, diarrhea, vomiting, or fevers. Her only sick contact was her  with some resolved URI symptoms. She reports that her last full meal was yesterday which she tolerated without issue and that she has proper fluid intake.    Interval hx: Confirmed primary presenting illness with patient. Last bowel movement was Saturday. Denied flatus. Denies significant weight changes.  Endorsed chest pain. Chest tenderness elicited upon palpation. Likely of musculoskeletal origin.      ROS  Review of Systems   Constitutional: Negative for chills and fever.   HENT: Positive for hearing loss. Negative for ear discharge, ear pain, nosebleeds and tinnitus.         Patient is deaf in right ear   Eyes: Negative.    Respiratory: Positive for shortness of breath. Negative for hemoptysis.    Cardiovascular: Positive for chest pain and leg swelling.        Describes chest pain as "discomfort" that radiates to back.  Background of chronic lower extremity swelling and tenderness.   Gastrointestinal: Positive for abdominal pain and nausea. Negative for vomiting.   Genitourinary: Negative.         Suprapubic meadows in place   Musculoskeletal: Positive for back pain.        Trauma to back resulted in 12 spinal surgeries. " Manages chronic pain with dilauded pump   Skin: Negative.    Neurological: Positive for tingling.   Endo/Heme/Allergies: Positive for environmental allergies.   Psychiatric/Behavioral: Positive for depression. The patient is nervous/anxious and has insomnia.         Mentioned she could not sleep for the past 3 days.       PEx  Temp:  [97.8 °F (36.6 °C)-98 °F (36.7 °C)]   Pulse:  [41-59]   Resp:  [12-20]   BP: ()/(41-67)   SpO2:  [92 %-100 %]     Intake/Output Summary (Last 24 hours) at 1/28/2020 1629  Last data filed at 1/28/2020 0900  Gross per 24 hour   Intake 150 ml   Output 1950 ml   Net -1800 ml       General Appearance: no acute distress.  Heart: Unable to attain due to ECG in place.  Respiratory: Normal respiratory effort, AM visit - biphasic wheezes were appreciated bilaterally at lower lung fields  Abdomen: bowel sounds undetected.  Skin: intact.Skin intact  Neurologic:  No focal numbness or weakness  Mental status: Alert, oriented x 4, affect appropriate     Recent Labs   Lab 01/27/20  1425 01/28/20  0248   WBC 5.80 4.44   HGB 10.8* 9.5*   HCT 35.5* 31.3*    170     Recent Labs   Lab 01/27/20  1425 01/28/20  0248    140   K 3.6 3.7   CL 96 100   CO2 31* 32*   BUN 15 15   CREATININE 1.1 0.8   GLU 76 87   CALCIUM 9.5 8.6*   MG  --  1.9   PHOS  --  3.2   LIPASE 15  --      Recent Labs   Lab 01/27/20  1425 01/28/20  0248   ALKPHOS 111 87   ALT 12 7*   AST 17 12   ALBUMIN 4.0 3.1*   PROT 7.3 5.8*   BILITOT 0.3 0.3      No results for input(s): POCTGLUCOSE in the last 168 hours.     Other significant findings:   Troponin I: today 0.006; yesterday (1/27/2020) 0.033  BNP: yesterday (1/27/2020) 17  Lactic acid: 1.4  TSH: today 6.572; 5 months ago 0.821  Free T4: today 0.97; 11 moths ago 1.04  Sedimentation rate: today 11  C-reactive protein: today 0.7  Urinalysis: yesterday (1/27/2020) occult blood 1+, otherwise within reference range.  Lipase: yesterday (1/27/2020) 15    EKG 12-lead today  1/28/2020 @ 0756  Test Reason : R00.1,    Vent. Rate : 039 BPM     Atrial Rate : 039 BPM     P-R Int : 174 ms          QRS Dur : 106 ms      QT Int : 554 ms       P-R-T Axes : 030 -23 -07 degrees     QTc Int : 445 ms  Marked sinus bradycardia  Nonspecific ST and T wave abnormality  Abnormal ECG  When compared with ECG of 27-JAN-2020 16:37,  No significant change was found    Transthoracic Echo 2D, color flow Doppler and spectral Doppler, study was adequate, conclusion of study today 1/28/2020:  · Normal left ventricular systolic function. The estimated ejection fraction is 60-65%.  · No wall motion abnormalities.  · Normal LV diastolic function.  · Normal right ventricular systolic function.  · The estimated PA systolic pressure is 38 mmHg.  · Intermediate central venous pressure (8 mmHg).  · Severe left atrial enlargement.  · Bradycardic with heart rate in low to mid 40s throughout the study.    Imaging:  Today 1/28/2020 @ 1022 X-Ray abdomen AP 1 view - gastrograffin challenge, 4-hr view;  Impression: Postoperative changes in the lower lumbar spine and baclofen pump as before.  Faintly visualized contrast material noted in the colon.  No significant small bowel dilatation.  Yesterday 1/27/2020 @ 1545 CT abdomen pelvis with contrast -   Impression:   1. Dilated distal small bowel loops noting slow flow through these loops.  The distal most small bowel is decompressed without discrete transition point identified however transition would likely reside within the right lower quadrant if present.  Findings overall are nonspecific, with differential including developing partial small bowel obstruction on the basis of adhesion versus nonspecific infectious or inflammatory enteritis.  Correlation is advised.  No pneumatosis or free air at this time.  Follow-up recommended.  2. Large amount of stool within the colon, suggesting constipation.  3. Moderate to large hiatal hernia.  4. Several additional findings  above.  Yesterday 1/27/2020 @ 1537 X-Ray Chest AP Portable -   Impression:  No acute disease identified in this patient with shortness of breath.  Study is limited by large body habitus, bedside technique and the patient's kyphotic posture.    Home Medication Hx per pharmacist (updated at 1400 1/28/2020):  PTA Medications   Medication Sig    aspirin (ECOTRIN) 81 MG EC tablet Take 1 tablet (81 mg total) by mouth once daily.    atorvastatin (LIPITOR) 80 MG tablet TAKE 1 TABLET BY MOUTH DAILY    butalbital-acetaminophen-caffeine -40 mg (FIORICET, ESGIC) -40 mg per tablet Take 1 tablet by mouth every 4 (four) hours as needed for Headaches.     docusate sodium (COLACE) 100 MG capsule Take 1 capsule (100 mg total) by mouth 3 (three) times daily as needed for constipation. (Patient taking differently: Take 300 mg by mouth every evening. )    FLUoxetine 20 MG capsule Take 3 capsules (60 mg total) by mouth once daily.    furosemide (LASIX) 40 MG tablet Take 1 tablet (40 mg total) by mouth once daily. (Patient taking differently: Take 80 mg by mouth every morning. )    HYDROcodone-acetaminophen (NORCO)  mg per tablet Take 1 tablet by mouth every 6 (six) hours as needed for Pain.    intrathecal pain pump compound Hydromorphone (7.5 mg/mL) infusion at 3.6799 mg/day (0.1533 mg/hr) out of a total reservoir volume of 37.3 mL    levothyroxine (SYNTHROID) 125 MCG tablet Take 1 tablet (125 mcg total) by mouth before breakfast.    lidocaine (LIDODERM) 5 % Place 1 patch onto the skin daily as needed (pain). USE AS DIRECTED WEAR FOR A MAXIMUM OF 12 HOURS    oxybutynin (DITROPAN XL) 15 MG TR24 Take 15 mg by mouth once daily.    pantoprazole (PROTONIX) 40 MG tablet Take 1 tablet (40 mg total) by mouth once daily.    potassium citrate (UROCIT-K) 10 mEq (1,080 mg) TbSR Take 2 tablets (20 mEq total) by mouth as needed. (Patient taking differently: Take 10 mEq by mouth once daily. )    promethazine (PHENERGAN)  25 MG tablet Take 25 mg by mouth every 6 (six) hours as needed for Nausea.    QUEtiapine (SEROQUEL) 100 MG Tab TAKE 1 TABLET (100 MG TOTAL) BY MOUTH EVERY EVENING.    SENNOSIDES (SENNA LAX ORAL) Take 20 mg by mouth every evening.     traZODone (DESYREL) 100 MG tablet Take 3 tablets (300 mg total) by mouth every evening.       Scheduled Meds:   cefTRIAXone (ROCEPHIN) IVPB  2 g Intravenous Q24H    enoxaparin  40 mg Subcutaneous Daily    metronidazole  500 mg Intravenous Q8H    pantoprazole  40 mg Intravenous Daily     Continuous Infusions:   dextrose 5 % and 0.9 % NaCl      intrathecal pain pump compound Stopped (01/28/20 1234)     As Needed:  albuterol-ipratropium, atropine, bisacodyl, Dextrose 10% Bolus, Dextrose 10% Bolus, glucagon (human recombinant), glucose, glucose, lidocaine, lorazepam, morphine, naloxone, promethazine (PHENERGAN) IVPB, sodium chloride 0.9%    Active Hospital Problems    Diagnosis  POA    *Partial small bowel obstruction [K56.600]  Yes    Left lower quadrant abdominal pain [R10.32]  Yes    Bradycardia, sinus [R00.1]  Unknown    Pure hypercholesterolemia [E78.00]  Yes    Lymphedema of both lower extremities [I89.0]  Yes     Chronic    Primary hypothyroidism [E03.9]  Yes     Chronic    Urinary retention [R33.9]  Yes     Chronic    Neurogenic bladder [N31.9]  Yes     Chronic    GERD (gastroesophageal reflux disease) [K21.9]  Yes     Chronic    Coronary artery disease involving native coronary artery of native heart without angina pectoris [I25.10]  Yes     Chronic    Narcotic dependency, continuous [F11.20]  Yes     Chronic    Chronic pain syndrome [G89.4]  Yes     Chronic    Generalized anxiety disorder [F41.1]  Yes      Resolved Hospital Problems   No resolved problems to display.       Assessment and Plan  / Problems managed today  * Partial small bowel obstruction (updated)  Left lower quadrant abdominal pain     Patient with three days of worsening left sided abdominal  pain with associated SOB. She reports being able to tolerate her normal PO diet without vomiting and that her fluid intake has been normal. She reports her usual constipation likely due to her chronic opioid use. No reports of melena, just occasional BRBPR due to known hemorrhoids. Although she has extensive back surgery, she denies any abdominal surgery. Initial lab work up shows grossly normal cbc without leukocytosis. CMP shows no major electrolyte derangements and normal LFT and bilirubin. CT scan of abdomen shows dilated distal small bowel loops noting slow flow through these loops. However findings were nonspecific, with differential including developing partial small bowel obstruction on the basis of adhesion versus nonspecific infectious or inflammatory enteritis. Also seen is a large amount of stool. Differential diagnosis includes inflammatory/infectious enteritis, ileus due to chronic opioid use, or adhesion.      - Will keep NPO for now, per surgery no NG tube decompression. Plan for gastrografin challenge and serial abdominal exams  - Will treat prophylacticly with rocephin and flagyl  - Patient takes large amount of laxatives at home, will give prn suppository, but may need to be escalated to lactulose enema.   - Will hold PO meds for now  -  Prn IV phenergan for nausea, cQTC 477 on admit ECG  - Maintenance fluid with D5 normal saline at 75 ml/hr for 10 hours    - Gastrograffin study completed. Will follow up with surgery to confirm ongoing plan        Bradycardia, sinus (updated)  Patient with reports of new onset bradycardia and accompanying hypotension. However, per chart review, numerous clinic visits have documented vitals with bradycardia (pulses between 45-55) and BP of 90's/50-60's. Previous echo done in February of 2019 without concerning findings. Patient is hemodynamically stable at time of exam. Electrolytes wnl.      - Patient due to get echo on 1/30, will order on current hospital  admission - Echo ordered and conclusion shows findings within normal reference range.  - Will order prn atropine for hemodynamic instability  - Assess for chronotropic response        Lymphedema of both lower extremities (unchanged)  - Chronic issue that patient has had for years  - Scheduled to see vascular for this on 1/30  - although tender to palpation, physical exam significant for bilateral swelling and normal BNP  - Will hold off on US lower extremity for now, one was done 1 year ago without findings, patient reports no worsening of symptoms since then.         Primary hypothyroidism (unchanged)  - Continue home levothyroxine when patient can tolerate PO        Neurogenic bladder (unchanged)  Urinary retention     - Patient has chronic meadows, switched every 3 weeks        Coronary artery disease involving native coronary artery of native heart without angina pectoris (unchanged)  Hyperlipidemia     - Home meds are ASA 81 mg and statin  - Continue when patient can tolerate PO        GERD (gastroesophageal reflux disease) - (unchanged)  - 40 mg pantoprazole at home  - will give pantoprazole 40 mg IV while NPO, switch to PO when able to tolerate        Chronic pain syndrome (updated)  Narcotic dependency     - Patient currently has dilaudid pump with daily dose increased to 3.67 mg dilaudid daily  - Also uses Norco  mg at home for breakthrough pain  - Will keep morphine prn for severe breakthrough pain  - prn narcan for opioid reversal   - biscodyl suppository for opioid induced constipation, may need enema as she takes large amounts of laxatives at home  - consider gabapentin for improved pain management if needed        Generalized anxiety disorder (updated)  - Continue home Seroquel when patient can tolerate PO  - Added lorazepam for improved management of anxiety    Tingling in fingers bilaterally (updated)   - Continue observing and address if symptoms worsen    VTE Risk Mitigation (From admission,  onward)         Ordered     IP VTE HIGH RISK PATIENT  Once      01/28/20 0135     Place sequential compression device  Until discontinued      01/28/20 0135     enoxaparin injection 40 mg  Daily      01/27/20 1943                Goals of Care:  Return to prior functional status    Discharge plan:     Time (minutes) spent in care of the patient (Greater than 1/2 spent in direct face-to-face contact)     Anand Lloyd [id number]   Med Team 1

## 2020-01-28 NOTE — CONSULTS
[facility]  General Surgery  Consult Note    Inpatient consult to General Surgery  Consult performed by: Elida Roland MD  Consult ordered by: Betito Love MD        Subjective:     History of Present Illness:   Tasha Hawley is a 64 y.o. female with h/o multiple comorbidities including CAD, suprapubic catheter, and chronic back pain with implanted dilaudid pain pump who was brought to the ER by her home health nurse for bradycardia. Dilaudid pain pump was increased by 10% on Friday. She reports some LLQ pain which started a couple days ago. Last bowel movement was yesterday, she normally has to take laxatives to counteract the dilaudid. CT scan showed mild dilation of small bowel with decompressed bowel distally, there was a significant stool burden in the colon with fecalization of the distal small bowel. On admission, patient is bradycardic and hypotensive. No leukocytosis, normal lactate.      No current facility-administered medications on file prior to encounter.      Current Outpatient Medications on File Prior to Encounter   Medication Sig    aspirin (ECOTRIN) 81 MG EC tablet Take 1 tablet (81 mg total) by mouth once daily.    atorvastatin (LIPITOR) 80 MG tablet TAKE 1 TABLET BY MOUTH DAILY    butalbital-acetaminophen-caffeine -40 mg (FIORICET, ESGIC) -40 mg per tablet     cefdinir (OMNICEF) 300 MG capsule Take 1 capsule (300 mg total) by mouth 2 (two) times daily. for 7 days    cholecalciferol, vitamin D3, (VITAMIN D3) 5,000 unit Tab Take 5,000 Units by mouth once daily.    docusate sodium (COLACE) 100 MG capsule Take 1 capsule (100 mg total) by mouth 3 (three) times daily as needed for constipation.    FLUoxetine 20 MG capsule Take 3 capsules (60 mg total) by mouth once daily.    furosemide (LASIX) 40 MG tablet Take 1 tablet (40 mg total) by mouth once daily.    HYDROcodone-acetaminophen (NORCO)  mg per tablet Take 1 tablet by mouth every 6  (six) hours as needed for Pain.    levothyroxine (SYNTHROID) 125 MCG tablet Take 1 tablet (125 mcg total) by mouth before breakfast.    lidocaine (LIDODERM) 5 % APPLY 1 PATCH DAILY AND WEAR FOR A MAXIMUM OF 12 HOURS    pantoprazole (PROTONIX) 40 MG tablet Take 1 tablet (40 mg total) by mouth once daily.    potassium citrate (UROCIT-K) 10 mEq (1,080 mg) TbSR Take 2 tablets (20 mEq total) by mouth as needed.    promethazine (PHENERGAN) 25 MG tablet Take 25 mg by mouth every 6 (six) hours as needed for Nausea.    QUEtiapine (SEROQUEL) 100 MG Tab TAKE 1 TABLET (100 MG TOTAL) BY MOUTH EVERY EVENING.    SENNOSIDES (SENNA LAX ORAL) Take 20 mg by mouth every evening.     traZODone (DESYREL) 100 MG tablet Take 3 tablets (300 mg total) by mouth every evening.    [DISCONTINUED] lamotrigine (LAMICTAL) 25 MG tablet Take 1 tablet (25 mg total) by mouth once daily.       Review of patient's allergies indicates:   Allergen Reactions    (d)-limonene flavor      Other reaction(s): difficult intubation  Other reaction(s): Difficulty breathing    Bactrim [sulfamethoxazole-trimethoprim] Anaphylaxis    Benadryl [diphenhydramine hcl] Shortness Of Breath    Imitrex [sumatriptan succinate] Shortness Of Breath    Topamax [topiramate] Shortness Of Breath    Vancomycin Shortness Of Breath     Rash    Butorphanol tartrate     Darvocet a500 [propoxyphene n-acetaminophen]      Other reaction(s): Difficulty breathing    Fentanyl      Other reaction(s): Vomiting  Other reaction(s): Nausea  Other reaction(s): Itching  swelling    White petrolatum-zinc     Zinc oxide-white petrolatum      Other reaction(s): Difficulty breathing    Latex, natural rubber Itching and Rash    Phenytoin Rash and Other (See Comments)     Trouble breathing       Past Medical History:   Diagnosis Date    Anticoagulant long-term use     Anxiety     Arthritis     Bilateral lower extremity edema     severe chronic    Carotid artery occlusion      Cataract     Coronary artery disease     subtotalled LAD with collateral    Depression     Fever blister     Hypothyroid     Iron deficiency anemia     Lumbar radiculopathy     with chronic pain    Ocular migraine     Sleep apnea     cpap     Past Surgical History:   Procedure Laterality Date    ADENOIDECTOMY      BACK SURGERY      x 12    CARDIAC CATHETERIZATION  2016    subtotalled LAD with right to left collaterals    CATARACT EXTRACTION W/  INTRAOCULAR LENS IMPLANT Left     Dr Coleman     CYSTOSCOPIC LITHOLAPAXY N/A 6/27/2019    Procedure: CYSTOLITHOLAPAXY;  Surgeon: Shireen Mayo MD;  Location: [facility] OR 2ND FLR;  Service: Urology;  Laterality: N/A;    CYSTOSCOPIC LITHOLAPAXY N/A 9/3/2019    Procedure: CYSTOLITHOLAPAXY;  Surgeon: Shireen Mayo MD;  Location: [facility] OR 2ND FLR;  Service: Urology;  Laterality: N/A;    ESOPHAGOGASTRODUODENOSCOPY N/A 5/23/2018    Procedure: ESOPHAGOGASTRODUODENOSCOPY (EGD);  Surgeon: Prince Vance MD;  Location: [facility] (4TH FLR);  Service: Endoscopy;  Laterality: N/A;  r/s 'd per Dr. Vance due to family emergency- ER    HYSTERECTOMY  1975    endometriosis    pain pump placement      SQ Dilaudid Pump managed by Dr. Hillman, Pain Management    REPLACEMENT OF CATHETER N/A 10/31/2019    Procedure: REPLACEMENT, CATHETER-SUPRAPUBIC;  Surgeon: Shireen Mayo MD;  Location: [facility] OR 1ST FLR;  Service: Urology;  Laterality: N/A;    SPINAL CORD STIMULATOR REMOVAL      before [facility]    SPINE SURGERY  5-13-13    CERVICAL FUSION    TONSILLECTOMY       Family History     Problem Relation (Age of Onset)    Cancer Mother (55), Father    Esophageal cancer Father    Heart disease Maternal Uncle    No Known Problems Brother, Brother, Sister, Maternal Aunt, Paternal Aunt, Paternal Uncle, Maternal Grandfather, Paternal Grandmother, Paternal Grandfather    Parkinsonism Maternal Grandmother    Tremor Maternal Grandmother        Tobacco Use    Smoking status: Never  Smoker    Smokeless tobacco: Never Used   Substance and Sexual Activity    Alcohol use: Never     Frequency: Never    Drug use: No    Sexual activity: Never     Review of Systems   Constitutional: Negative for activity change, chills and fever.   Respiratory: Positive for shortness of breath. Negative for cough.    Cardiovascular: Positive for leg swelling. Negative for chest pain and palpitations.   Gastrointestinal: Positive for abdominal pain. Negative for abdominal distention, constipation, diarrhea, nausea and vomiting.   Musculoskeletal: Negative for arthralgias and myalgias.   Neurological: Negative for dizziness and headaches.   Psychiatric/Behavioral: Negative for agitation and confusion.     Objective:     Vital Signs (Most Recent):  Temp: 98 °F (36.7 °C) (01/27/20 1850)  Pulse: (!) 44 (01/27/20 1854)  Resp: 20 (01/27/20 1854)  BP: (!) 94/47 (01/27/20 1854)  SpO2: 96 % (01/27/20 1854) Vital Signs (24h Range):  Temp:  [97.4 °F (36.3 °C)-98 °F (36.7 °C)] 98 °F (36.7 °C)  Pulse:  [44-59] 44  Resp:  [16-20] 20  SpO2:  [95 %-98 %] 96 %  BP: ()/(47-57) 94/47     Weight: 82.1 kg (181 lb)  Body mass index is 30.12 kg/m².    No intake or output data in the 24 hours ending 01/27/20 2028    Physical Exam   Constitutional: She is oriented to person, place, and time. She appears well-developed and well-nourished. No distress.   Cardiovascular: Regular rhythm.   Bradycardic   Pulmonary/Chest: Effort normal. No respiratory distress.   Abdominal: Soft. She exhibits no distension. There is tenderness (LLQ tenderness). There is no rebound and no guarding.   Neurological: She is alert and oriented to person, place, and time.   Skin: Skin is warm and dry.   Psychiatric: She has a normal mood and affect. Her behavior is normal.       Significant Labs:  CBC:   Recent Labs   Lab 01/27/20  1425   WBC 5.80   RBC 3.99*   HGB 10.8*   HCT 35.5*      MCV 89   MCH 27.1   MCHC 30.4*     CMP:   Recent Labs   Lab  01/27/20  1425   GLU 76   CALCIUM 9.5   ALBUMIN 4.0   PROT 7.3      K 3.6   CO2 31*   CL 96   BUN 15   CREATININE 1.1   ALKPHOS 111   ALT 12   AST 17   BILITOT 0.3       Significant Diagnostics:  I have reviewed all pertinent imaging results/findings within the past 24 hours.    Assessment/Plan:     Active Diagnoses:    Diagnosis Date Noted POA    PRINCIPAL PROBLEM:  Partial small bowel obstruction [K56.600] 01/27/2020 Unknown    Left lower quadrant abdominal pain [R10.32] 01/27/2020 Yes      Problems Resolved During this Admission:     Likely constipation rather than SBO; however, given patient's extensive surgical history, can't rule out adhesive bowel obstruction.  Plan for gastrograffin challenge  Serial abdominal exams  Okay for sips of clears, hold for nausea/vomiting      Elida Roland MD  General Surgery  [facility]

## 2020-01-28 NOTE — ASSESSMENT & PLAN NOTE
- 40 mg pantoprazole at home  - will give pantoprazole 40 mg IV while NPO, switch to PO when able to tolerate

## 2020-01-28 NOTE — SUBJECTIVE & OBJECTIVE
Past Medical History:   Diagnosis Date    Anticoagulant long-term use     Anxiety     Arthritis     Bilateral lower extremity edema     severe chronic    Carotid artery occlusion     Cataract     Coronary artery disease     subtotalled LAD with collateral    Depression     Fever blister     Hypothyroid     Iron deficiency anemia     Lumbar radiculopathy     with chronic pain    Ocular migraine     Sleep apnea     cpap       Past Surgical History:   Procedure Laterality Date    ADENOIDECTOMY      BACK SURGERY      x 12    CARDIAC CATHETERIZATION  2016    subtotalled LAD with right to left collaterals    CATARACT EXTRACTION W/  INTRAOCULAR LENS IMPLANT Left     Dr Coleman     CYSTOSCOPIC LITHOLAPAXY N/A 6/27/2019    Procedure: CYSTOLITHOLAPAXY;  Surgeon: Shireen Mayo MD;  Location: [facility] OR 2ND FLR;  Service: Urology;  Laterality: N/A;    CYSTOSCOPIC LITHOLAPAXY N/A 9/3/2019    Procedure: CYSTOLITHOLAPAXY;  Surgeon: Shireen Mayo MD;  Location: [facility] OR 2ND FLR;  Service: Urology;  Laterality: N/A;    ESOPHAGOGASTRODUODENOSCOPY N/A 5/23/2018    Procedure: ESOPHAGOGASTRODUODENOSCOPY (EGD);  Surgeon: Prince Vance MD;  Location: [facility] (4TH FLR);  Service: Endoscopy;  Laterality: N/A;  r/s 'd per Dr. Vance due to family emergency- ER    HYSTERECTOMY  1975    endometriosis    pain pump placement      SQ Dilaudid Pump managed by Dr. Hillman, Pain Management    REPLACEMENT OF CATHETER N/A 10/31/2019    Procedure: REPLACEMENT, CATHETER-SUPRAPUBIC;  Surgeon: Shireen Mayo MD;  Location: [facility] OR 1ST FLR;  Service: Urology;  Laterality: N/A;    SPINAL CORD STIMULATOR REMOVAL      before [place]    SPINE SURGERY  5-13-13    CERVICAL FUSION    TONSILLECTOMY         Review of patient's allergies indicates:   Allergen Reactions    (d)-limonene flavor      Other reaction(s): difficult intubation  Other reaction(s): Difficulty breathing    Bactrim [sulfamethoxazole-trimethoprim]  Anaphylaxis    Benadryl [diphenhydramine hcl] Shortness Of Breath    Imitrex [sumatriptan succinate] Shortness Of Breath    Topamax [topiramate] Shortness Of Breath    Vancomycin Shortness Of Breath     Rash    Butorphanol tartrate     Darvocet a500 [propoxyphene n-acetaminophen]      Other reaction(s): Difficulty breathing    Fentanyl      Other reaction(s): Vomiting  Other reaction(s): Nausea  Other reaction(s): Itching  swelling    White petrolatum-zinc     Zinc oxide-white petrolatum      Other reaction(s): Difficulty breathing    Latex, natural rubber Itching and Rash    Phenytoin Rash and Other (See Comments)     Trouble breathing       No current facility-administered medications on file prior to encounter.      Current Outpatient Medications on File Prior to Encounter   Medication Sig    aspirin (ECOTRIN) 81 MG EC tablet Take 1 tablet (81 mg total) by mouth once daily.    atorvastatin (LIPITOR) 80 MG tablet TAKE 1 TABLET BY MOUTH DAILY    butalbital-acetaminophen-caffeine -40 mg (FIORICET, ESGIC) -40 mg per tablet     [] cefdinir (OMNICEF) 300 MG capsule Take 1 capsule (300 mg total) by mouth 2 (two) times daily. for 7 days    cholecalciferol, vitamin D3, (VITAMIN D3) 5,000 unit Tab Take 5,000 Units by mouth once daily.    docusate sodium (COLACE) 100 MG capsule Take 1 capsule (100 mg total) by mouth 3 (three) times daily as needed for constipation.    FLUoxetine 20 MG capsule Take 3 capsules (60 mg total) by mouth once daily.    furosemide (LASIX) 40 MG tablet Take 1 tablet (40 mg total) by mouth once daily.    HYDROcodone-acetaminophen (NORCO)  mg per tablet Take 1 tablet by mouth every 6 (six) hours as needed for Pain.    levothyroxine (SYNTHROID) 125 MCG tablet Take 1 tablet (125 mcg total) by mouth before breakfast.    lidocaine (LIDODERM) 5 % APPLY 1 PATCH DAILY AND WEAR FOR A MAXIMUM OF 12 HOURS    pantoprazole (PROTONIX) 40 MG tablet Take 1 tablet (40  mg total) by mouth once daily.    potassium citrate (UROCIT-K) 10 mEq (1,080 mg) TbSR Take 2 tablets (20 mEq total) by mouth as needed.    promethazine (PHENERGAN) 25 MG tablet Take 25 mg by mouth every 6 (six) hours as needed for Nausea.    QUEtiapine (SEROQUEL) 100 MG Tab TAKE 1 TABLET (100 MG TOTAL) BY MOUTH EVERY EVENING.    SENNOSIDES (SENNA LAX ORAL) Take 20 mg by mouth every evening.     traZODone (DESYREL) 100 MG tablet Take 3 tablets (300 mg total) by mouth every evening.    [DISCONTINUED] lamotrigine (LAMICTAL) 25 MG tablet Take 1 tablet (25 mg total) by mouth once daily.     Family History     Problem Relation (Age of Onset)    Cancer Mother (55), Father    Esophageal cancer Father    Heart disease Maternal Uncle    No Known Problems Brother, Brother, Sister, Maternal Aunt, Paternal Aunt, Paternal Uncle, Maternal Grandfather, Paternal Grandmother, Paternal Grandfather    Parkinsonism Maternal Grandmother    Tremor Maternal Grandmother        Tobacco Use    Smoking status: Never Smoker    Smokeless tobacco: Never Used   Substance and Sexual Activity    Alcohol use: Never     Frequency: Never    Drug use: No    Sexual activity: Never     Review of Systems   Constitutional: Positive for appetite change, chills and fatigue. Negative for fever and unexpected weight change.   HENT: Positive for hearing loss (mild bilateral hearing loss (chronic)). Negative for congestion and sore throat.    Respiratory: Positive for shortness of breath (occasional). Negative for cough and chest tightness.    Cardiovascular: Positive for chest pain (left lower) and leg swelling. Negative for palpitations.   Gastrointestinal: Positive for abdominal distention, abdominal pain (left sided), anal bleeding and constipation. Negative for blood in stool, diarrhea, nausea and vomiting.   Genitourinary: Positive for flank pain (left sided).   Musculoskeletal: Positive for back pain. Negative for arthralgias and myalgias.    Skin: Negative for color change, pallor and rash.   Neurological: Positive for weakness and light-headedness. Negative for dizziness and headaches.   Psychiatric/Behavioral: The patient is nervous/anxious.      Objective:     Vital Signs (Most Recent):  Temp: 98 °F (36.7 °C) (01/27/20 1850)  Pulse: (!) 44 (01/28/20 0138)  Resp: 13 (01/28/20 0138)  BP: 118/67 (01/28/20 0138)  SpO2: (!) 94 % (01/28/20 0138) Vital Signs (24h Range):  Temp:  [97.4 °F (36.3 °C)-98 °F (36.7 °C)] 98 °F (36.7 °C)  Pulse:  [44-59] 44  Resp:  [12-20] 13  SpO2:  [93 %-98 %] 94 %  BP: ()/(41-67) 118/67     Weight: 81.7 kg (180 lb 1.9 oz)  Body mass index is 29.97 kg/m².    Physical Exam   Constitutional: She is oriented to person, place, and time. She appears well-developed and well-nourished. No distress.   HENT:   Head: Normocephalic and atraumatic.   Eyes: Pupils are equal, round, and reactive to light. Conjunctivae and EOM are normal. No scleral icterus.   Neck: Normal range of motion. Neck supple. No JVD present.   Cardiovascular: Regular rhythm, normal heart sounds and intact distal pulses. Exam reveals no friction rub.   No murmur heard.  bradycardic   Pulmonary/Chest: Effort normal and breath sounds normal. No respiratory distress. She has no wheezes. She has no rales.   Abdominal: Soft. Bowel sounds are normal. She exhibits no distension. There is tenderness (left upper and lower quadrant).   Musculoskeletal: Normal range of motion. She exhibits edema (bilateral lower extremity, non-pitting) and tenderness. She exhibits no deformity.   Neurological: She is alert and oriented to person, place, and time.   Skin: Skin is warm and dry.   Psychiatric: She has a normal mood and affect. Her behavior is normal.   Vitals reviewed.        CRANIAL NERVES     CN III, IV, VI   Pupils are equal, round, and reactive to light.  Extraocular motions are normal.        Significant Labs: All pertinent labs within the past 24 hours have been  reviewed.    Significant Imaging: I have reviewed all pertinent imaging results/findings within the past 24 hours.

## 2020-01-28 NOTE — TELEPHONE ENCOUNTER
Called pt to reschedule 01/30/2020 appt. She is currently admitted to hospital. Stated she will call to rs once she is dc.

## 2020-01-28 NOTE — CARE UPDATE
"RAPID RESPONSE NURSE PROACTIVE ROUNDING NOTE     Time of Visit: 08    Admit Date: 2020  LOS: 1  Code Status: Full Code   Date of Visit: 2020  : 1955  Age: 64 y.o.  Sex: female  Race: White  Bed: 1160/1160 B:   MRN: [id number]  Was the patient discharged from an ICU this admission? no   Was the patient discharged from a PACU within last 24 hours?  no  Did the patient receive conscious sedation/general anesthesia in last 24 hours?  no  Was the patient in the ED within the past 24 hours?  yes  Was the patient started on NIPPV within the past 24 hours?  no  Attending Physician: Raegan Randle MD  Primary Service: [facility]    ASSESSMENT     Diagnosis: Partial small bowel obstruction    Abnormal Vital Signs: BP (!) 90/47   Pulse (!) 43   Temp 98 °F (36.7 °C) (Oral)   Resp 13   Ht 5' 5" (1.651 m)   Wt 81.7 kg (180 lb 1.9 oz)   LMP  (LMP Unknown)   SpO2 98%   Breastfeeding? No   BMI 29.97 kg/m²      Clinical Issues: Dysrythmia    Patient  has a past medical history of Anticoagulant long-term use, Anxiety, Arthritis, Bilateral lower extremity edema, Carotid artery occlusion, Cataract, Coronary artery disease, Depression, Fever blister, Hypothyroid, Iron deficiency anemia, Lumbar radiculopathy, Ocular migraine, and Sleep apnea.    Pt seen for bradycardia with HR 40-50s. Pt asymptomatic at bedside with only complaints of abdominal pain 2/2 SBO. Per history notes, pt have several clinic visits with documentation of being bradycardic but remains hemodynamically stable.     INTERVENTIONS/ RECOMMENDATIONS     Continuous tele and monitor for S/S 2/2 bradycardia    Discussed plan of care with charge Joanne MENDOZA.    PHYSICIAN ESCALATION     Yes/No  no    Orders received and case discussed with NA.    Disposition: Remain in room 1160B.    FOLLOW-UP     Call back the Rapid Response Nurse, Jasen Upton RN at [phone] for additional questions or concerns.          "

## 2020-01-28 NOTE — PROGRESS NOTES
Received call from telemetry stating that pt HR was 42. Nurse came to bs to assess pt and took vs. HR was 48, bp=92/51, and O2=92% on RA; pt also c/o SOB and mild chest pain 4/10.  Nurse place pt on 2L NC of oxygen.  IM-1 MD notified and spoke to Dr. Peres. Dr. Peres said that is the baseline of pt HR, but he will order EKG and troponin lab to make sure.

## 2020-01-28 NOTE — HPI
Patient is a 64 year old female with CAD, hypothyroid, sleep apnea, arthritis, neurogenic bladder with meadows at home (changed every 3 weeks), and chronic back pain since a work accident in 1997 (has had 12 back surgeries) on dilaudid pump who presents to hospital with complaints of back pain; also complains of shortness of breath. She reports that her pain started 3 days ago without particular inciting incident. She sat down as she felt a sharp pain followed by shortness of breath. At the time, she took her pulse and blood pressure; she does this at home to make sure she is tolerating her dilaudid pump. She found that she was hypotensive at 90's/60's and was bradycardic with pulse between 40-50. Initially she waited to see if the pain would resolve but it continued to get worse. She finally came to the hospital when her home health nurse took her vitals and noted the bradycardia. Of note, patient was treated for UTI that occurred 2 weeks ago; she finished a 7 days course of antibiotics (initially treated with amoxicillin but switched to omnicef due to previous resistant proteus). In addition, she was to follow up with cardiology on the 30th with echo as well as meet with vascular surgery for her chronic leg swelling. Her previous echo in February of 2019 had normal systolic and diastolic function. She also has recently had a 10% increase to her given dilaudid dose; now gets 3.67 mg daily.     In ED, her cbc showed a mild anemia of 10.8 (chronic problem) and no leukocytosis. CMP was grossly normal. Cardiac work up was negative with a negative BNP, mild increase in troponin to .033, and ECG showing sinus bradycardia. Her urine was negative for UTI, lipase and lactic acid wnl. Imaging was significant for a CT abdomen which showed nonspecific findings , with differential including developing partial small bowel obstruction on the basis of adhesion versus nonspecific infectious or inflammatory enteritis. At this point  patient had already been seen by general surgery who recommend non-operative management for now, and gastrografin scan; they recommend against NG tube as well.     At the time of my exam, patient endorses some mild light headedness, lower chest pain, left sided abdominal pain, diffuse back pain, leg swelling, constipation,  hematuria (chronic problem), occasional FRBPR due to hemorrhoids and chills. She denies dizziness, headaches, shortness of breath, cough, diarrhea, vomiting, or fevers. Her only sick contact was her  with some resolved URI symptoms. She reports that her last full meal was yesterday which she tolerated without issue and that she has proper fluid intake.

## 2020-01-28 NOTE — H&P
[facility] Medicine  History & Physical    Patient Name: Tasha Hawley  MRN: [id number]  Admission Date: 1/27/2020  Attending Physician: Raegan Randle MD   Primary Care Provider: Mesfin Hodges Ii, MD    [facility] Medicine Team: [facility] HOSP MED 1 Barrera Hurtado MD     Patient information was obtained from patient, spouse/SO and ER records.     Subjective:     Principal Problem:Partial small bowel obstruction    Chief Complaint:   Chief Complaint   Patient presents with    Abdominal Pain     patient with suprapubic catheter just finished two rounds of antibiotic for a UTI.  Home health stated that her heart rate was low and sent her to the ED.  Patient reporting abdominal pain and bloating as well        HPI: Patient is a 64 year old female with CAD, hypothyroid, sleep apnea, arthritis, neurogenic bladder with meadows at home (changed every 3 weeks), and chronic back pain since a work accident in 1997 (has had 12 back surgeries) on dilaudid pump who presents to hospital with complaints of back pain; also complains of shortness of breath. She reports that her pain started 3 days ago without particular inciting incident. She sat down as she felt a sharp pain followed by shortness of breath. At the time, she took her pulse and blood pressure; she does this at home to make sure she is tolerating her dilaudid pump. She found that she was hypotensive at 90's/60's and was bradycardic with pulse between 40-50. Initially she waited to see if the pain would resolve but it continued to get worse. She finally came to the hospital when her home health nurse took her vitals and noted the bradycardia. Of note, patient was treated for UTI that occurred 2 weeks ago; she finished a 7 days course of antibiotics (initially treated with amoxicillin but switched to omnicef due to previous resistant proteus). In addition, she was to follow up with cardiology on the 30th with echo as well as meet with  vascular surgery for her chronic leg swelling. Her previous echo in February of 2019 had normal systolic and diastolic function. She also has recently had a 10% increase to her given dilaudid dose; now gets 3.67 mg daily.     In ED, her cbc showed a mild anemia of 10.8 (chronic problem) and no leukocytosis. CMP was grossly normal. Cardiac work up was negative with a negative BNP, mild increase in troponin to .033, and ECG showing sinus bradycardia. Her urine was negative for UTI, lipase and lactic acid wnl. Imaging was significant for a CT abdomen which showed nonspecific findings , with differential including developing partial small bowel obstruction on the basis of adhesion versus nonspecific infectious or inflammatory enteritis. At this point patient had already been seen by general surgery who recommend non-operative management for now, and gastrografin scan; they recommend against NG tube as well.     At the time of my exam, patient endorses some mild light headedness, lower chest pain, left sided abdominal pain, diffuse back pain, leg swelling, constipation,  hematuria (chronic problem), occasional FRBPR due to hemorrhoids and chills. She denies dizziness, headaches, shortness of breath, cough, diarrhea, vomiting, or fevers. Her only sick contact was her  with some resolved URI symptoms. She reports that her last full meal was yesterday which she tolerated without issue and that she has proper fluid intake.    Past Medical History:   Diagnosis Date    Anticoagulant long-term use     Anxiety     Arthritis     Bilateral lower extremity edema     severe chronic    Carotid artery occlusion     Cataract     Coronary artery disease     subtotalled LAD with collateral    Depression     Fever blister     Hypothyroid     Iron deficiency anemia     Lumbar radiculopathy     with chronic pain    Ocular migraine     Sleep apnea     cpap       Past Surgical History:   Procedure Laterality Date     ADENOIDECTOMY      BACK SURGERY      x 12    CARDIAC CATHETERIZATION  2016    subtotalled LAD with right to left collaterals    CATARACT EXTRACTION W/  INTRAOCULAR LENS IMPLANT Left     Dr Coleman     CYSTOSCOPIC LITHOLAPAXY N/A 6/27/2019    Procedure: CYSTOLITHOLAPAXY;  Surgeon: Shireen Mayo MD;  Location: [facility] OR 2ND FLR;  Service: Urology;  Laterality: N/A;    CYSTOSCOPIC LITHOLAPAXY N/A 9/3/2019    Procedure: CYSTOLITHOLAPAXY;  Surgeon: Shireen Mayo MD;  Location: [facility] OR 2ND FLR;  Service: Urology;  Laterality: N/A;    ESOPHAGOGASTRODUODENOSCOPY N/A 5/23/2018    Procedure: ESOPHAGOGASTRODUODENOSCOPY (EGD);  Surgeon: Prince Vance MD;  Location: [facility] (4TH FLR);  Service: Endoscopy;  Laterality: N/A;  r/s 'd per Dr. Vance due to family emergency- ER    HYSTERECTOMY  1975    endometriosis    pain pump placement      SQ Dilaudid Pump managed by Dr. Hillman, Pain Management    REPLACEMENT OF CATHETER N/A 10/31/2019    Procedure: REPLACEMENT, CATHETER-SUPRAPUBIC;  Surgeon: Shireen Mayo MD;  Location: [facility] OR 1ST FLR;  Service: Urology;  Laterality: N/A;    SPINAL CORD STIMULATOR REMOVAL      before [place]    SPINE SURGERY  5-13-13    CERVICAL FUSION    TONSILLECTOMY         Review of patient's allergies indicates:   Allergen Reactions    (d)-limonene flavor      Other reaction(s): difficult intubation  Other reaction(s): Difficulty breathing    Bactrim [sulfamethoxazole-trimethoprim] Anaphylaxis    Benadryl [diphenhydramine hcl] Shortness Of Breath    Imitrex [sumatriptan succinate] Shortness Of Breath    Topamax [topiramate] Shortness Of Breath    Vancomycin Shortness Of Breath     Rash    Butorphanol tartrate     Darvocet a500 [propoxyphene n-acetaminophen]      Other reaction(s): Difficulty breathing    Fentanyl      Other reaction(s): Vomiting  Other reaction(s): Nausea  Other reaction(s): Itching  swelling    White petrolatum-zinc     Zinc oxide-white petrolatum       Other reaction(s): Difficulty breathing    Latex, natural rubber Itching and Rash    Phenytoin Rash and Other (See Comments)     Trouble breathing       No current facility-administered medications on file prior to encounter.      Current Outpatient Medications on File Prior to Encounter   Medication Sig    aspirin (ECOTRIN) 81 MG EC tablet Take 1 tablet (81 mg total) by mouth once daily.    atorvastatin (LIPITOR) 80 MG tablet TAKE 1 TABLET BY MOUTH DAILY    butalbital-acetaminophen-caffeine -40 mg (FIORICET, ESGIC) -40 mg per tablet     [] cefdinir (OMNICEF) 300 MG capsule Take 1 capsule (300 mg total) by mouth 2 (two) times daily. for 7 days    cholecalciferol, vitamin D3, (VITAMIN D3) 5,000 unit Tab Take 5,000 Units by mouth once daily.    docusate sodium (COLACE) 100 MG capsule Take 1 capsule (100 mg total) by mouth 3 (three) times daily as needed for constipation.    FLUoxetine 20 MG capsule Take 3 capsules (60 mg total) by mouth once daily.    furosemide (LASIX) 40 MG tablet Take 1 tablet (40 mg total) by mouth once daily.    HYDROcodone-acetaminophen (NORCO)  mg per tablet Take 1 tablet by mouth every 6 (six) hours as needed for Pain.    levothyroxine (SYNTHROID) 125 MCG tablet Take 1 tablet (125 mcg total) by mouth before breakfast.    lidocaine (LIDODERM) 5 % APPLY 1 PATCH DAILY AND WEAR FOR A MAXIMUM OF 12 HOURS    pantoprazole (PROTONIX) 40 MG tablet Take 1 tablet (40 mg total) by mouth once daily.    potassium citrate (UROCIT-K) 10 mEq (1,080 mg) TbSR Take 2 tablets (20 mEq total) by mouth as needed.    promethazine (PHENERGAN) 25 MG tablet Take 25 mg by mouth every 6 (six) hours as needed for Nausea.    QUEtiapine (SEROQUEL) 100 MG Tab TAKE 1 TABLET (100 MG TOTAL) BY MOUTH EVERY EVENING.    SENNOSIDES (SENNA LAX ORAL) Take 20 mg by mouth every evening.     traZODone (DESYREL) 100 MG tablet Take 3 tablets (300 mg total) by mouth every evening.     [DISCONTINUED] lamotrigine (LAMICTAL) 25 MG tablet Take 1 tablet (25 mg total) by mouth once daily.     Family History     Problem Relation (Age of Onset)    Cancer Mother (55), Father    Esophageal cancer Father    Heart disease Maternal Uncle    No Known Problems Brother, Brother, Sister, Maternal Aunt, Paternal Aunt, Paternal Uncle, Maternal Grandfather, Paternal Grandmother, Paternal Grandfather    Parkinsonism Maternal Grandmother    Tremor Maternal Grandmother        Tobacco Use    Smoking status: Never Smoker    Smokeless tobacco: Never Used   Substance and Sexual Activity    Alcohol use: Never     Frequency: Never    Drug use: No    Sexual activity: Never     Review of Systems   Constitutional: Positive for appetite change, chills and fatigue. Negative for fever and unexpected weight change.   HENT: Positive for hearing loss (mild bilateral hearing loss (chronic)). Negative for congestion and sore throat.    Respiratory: Positive for shortness of breath (occasional). Negative for cough and chest tightness.    Cardiovascular: Positive for chest pain (left lower) and leg swelling. Negative for palpitations.   Gastrointestinal: Positive for abdominal distention, abdominal pain (left sided), anal bleeding and constipation. Negative for blood in stool, diarrhea, nausea and vomiting.   Genitourinary: Positive for flank pain (left sided).   Musculoskeletal: Positive for back pain. Negative for arthralgias and myalgias.   Skin: Negative for color change, pallor and rash.   Neurological: Positive for weakness and light-headedness. Negative for dizziness and headaches.   Psychiatric/Behavioral: The patient is nervous/anxious.      Objective:     Vital Signs (Most Recent):  Temp: 98 °F (36.7 °C) (01/27/20 1850)  Pulse: (!) 44 (01/28/20 0138)  Resp: 13 (01/28/20 0138)  BP: 118/67 (01/28/20 0138)  SpO2: (!) 94 % (01/28/20 0138) Vital Signs (24h Range):  Temp:  [97.4 °F (36.3 °C)-98 °F (36.7 °C)] 98 °F (36.7  °C)  Pulse:  [44-59] 44  Resp:  [12-20] 13  SpO2:  [93 %-98 %] 94 %  BP: ()/(41-67) 118/67     Weight: 81.7 kg (180 lb 1.9 oz)  Body mass index is 29.97 kg/m².    Physical Exam   Constitutional: She is oriented to person, place, and time. She appears well-developed and well-nourished. No distress.   HENT:   Head: Normocephalic and atraumatic.   Eyes: Pupils are equal, round, and reactive to light. Conjunctivae and EOM are normal. No scleral icterus.   Neck: Normal range of motion. Neck supple. No JVD present.   Cardiovascular: Regular rhythm, normal heart sounds and intact distal pulses. Exam reveals no friction rub.   No murmur heard.  bradycardic   Pulmonary/Chest: Effort normal and breath sounds normal. No respiratory distress. She has no wheezes. She has no rales.   Abdominal: Soft. Bowel sounds are normal. She exhibits no distension. There is tenderness (left upper and lower quadrant).   Musculoskeletal: Normal range of motion. She exhibits edema (bilateral lower extremity, non-pitting) and tenderness. She exhibits no deformity.   Neurological: She is alert and oriented to person, place, and time.   Skin: Skin is warm and dry.   Psychiatric: She has a normal mood and affect. Her behavior is normal.   Vitals reviewed.        CRANIAL NERVES     CN III, IV, VI   Pupils are equal, round, and reactive to light.  Extraocular motions are normal.        Significant Labs: All pertinent labs within the past 24 hours have been reviewed.    Significant Imaging: I have reviewed all pertinent imaging results/findings within the past 24 hours.    Assessment/Plan:     * Partial small bowel obstruction  Left lower quadrant abdominal pain    Patient with three days of worsening left sided abdominal pain with associated SOB. She reports being able to tolerate her normal PO diet without vomiting and that her fluid intake has been normal. She reports her usual constipation likely due to her chronic opioid use. No reports of  melena, just occasional BRBPR due to known hemorrhoids. Although she has extensive back surgery, she denies any abdominal surgery. Initial lab work up shows grossly normal cbc without leukocytosis. CMP shows no major electrolyte derangements and normal LFT and bilirubin. CT scan of abdomen shows dilated distal small bowel loops noting slow flow through these loops. However findings were nonspecific, with differential including developing partial small bowel obstruction on the basis of adhesion versus nonspecific infectious or inflammatory enteritis. Also seen is a large amount of stool. Differential diagnosis includes inflammatory/infectious enteritis, ileus due to chronic opioid use, or adhesion.     - Will keep NPO for now, per surgery no NG tube decompression. Plan for gastrografin challenge and serial abdominal exams  - Will treat prophylacticly with rocephin and flagyl  - Patient takes large amount of laxatives at home, will give prn suppository, but may need to be escalated to lactulose enema.   - Will hold PO meds for now  -  Prn IV phenergan for nausea, cQTC 477 on admit ECG  - Maintenance fluid with D5 normal saline at 75 ml/hr for 10 hours        Bradycardia, sinus  Patient with reports of new onset bradycardia and accompanying hypotension. However, per chart review, numerous clinic visits have documented vitals with bradycardia (pulses between 45-55) and BP of 90's/50-60's. Previous echo done in February of 2019 without concerning findings. Patient is hemodynamically stable at time of exam. Electrolytes wnl.     - Patient due to get echo on 1/30, will order on current hospital admission  - Will order prn atropine for hemodynamic instability       Lymphedema of both lower extremities  - Chronic issue that patient has had for years  - Scheduled to see vascular for this on 1/30  - although tender to palpation, physical exam significant for bilateral swelling and normal BNP  - Will hold off on US lower  extremity for now, one was done 1 year ago without findings, patient reports no worsening of symptoms since then.       Primary hypothyroidism  - Continue home levothyroxine when patient can tolerate PO      Neurogenic bladder  Urinary retention    - Patient has chronic meadows, switched every 3 weeks      Coronary artery disease involving native coronary artery of native heart without angina pectoris  Hyperlipidemia    - Home meds are ASA 81 mg and statin  - Continue when patient can tolerate PO      GERD (gastroesophageal reflux disease)  - 40 mg pantoprazole at home  - will give pantoprazole 40 mg IV while NPO, switch to PO when able to tolerate      Chronic pain syndrome  Narcotic dependency    - Patient currently has dilaudid pump with daily dose increased to 3.67 mg dilaudid daily  - Also uses Norco  mg at home for breakthrough pain  - Will keep morphine prn for severe breakthrough pain  - prn narcan for opioid reversal   - biscodyl suppository for opioid induced constipation, may need enema as she takes large amounts of laxatives at home      Generalized anxiety disorder  - Continue home Seroquel when patient can tolerate PO         VTE Risk Mitigation (From admission, onward)         Ordered     IP VTE HIGH RISK PATIENT  Once      01/28/20 0135     Place sequential compression device  Until discontinued      01/28/20 0135     enoxaparin injection 40 mg  Daily      01/27/20 1943                   Barrera Hurtado MD  Department of Hospital Medicine   [facility]

## 2020-01-28 NOTE — SUBJECTIVE & OBJECTIVE
Interval History:   No acute events overnight. Afebrile. VSS. Reporting ongoing abdominal pain this morning. Denies flatus of BM. No nausea or vomiting. Given gastrografin at 3:20 AM. Due for first abdominal plain film now.      Medications:  Continuous Infusions:   dextrose 5 % and 0.9 % NaCl 75 mL/hr at 01/27/20 7643     Scheduled Meds:   cefTRIAXone (ROCEPHIN) IVPB  2 g Intravenous Q24H    enoxaparin  40 mg Subcutaneous Daily    metronidazole  500 mg Intravenous Q8H    pantoprazole  40 mg Intravenous Daily     PRN Meds:albuterol-ipratropium, atropine, bisacodyl, Dextrose 10% Bolus, Dextrose 10% Bolus, glucagon (human recombinant), glucose, glucose, lidocaine, morphine, naloxone, promethazine (PHENERGAN) IVPB, sodium chloride 0.9%     Review of patient's allergies indicates:   Allergen Reactions    (d)-limonene flavor      Other reaction(s): difficult intubation  Other reaction(s): Difficulty breathing    Bactrim [sulfamethoxazole-trimethoprim] Anaphylaxis    Benadryl [diphenhydramine hcl] Shortness Of Breath    Imitrex [sumatriptan succinate] Shortness Of Breath    Topamax [topiramate] Shortness Of Breath    Vancomycin Shortness Of Breath     Rash    Butorphanol tartrate     Darvocet a500 [propoxyphene n-acetaminophen]      Other reaction(s): Difficulty breathing    Fentanyl      Other reaction(s): Vomiting  Other reaction(s): Nausea  Other reaction(s): Itching  swelling    White petrolatum-zinc     Zinc oxide-white petrolatum      Other reaction(s): Difficulty breathing    Latex, natural rubber Itching and Rash    Phenytoin Rash and Other (See Comments)     Trouble breathing     Objective:     Vital Signs (Most Recent):  Temp: 98 °F (36.7 °C) (01/27/20 1850)  Pulse: (!) 48 (01/28/20 0730)  Resp: 14 (01/28/20 0730)  BP: (!) 92/51 (01/28/20 0730)  SpO2: (!) 92 % (01/28/20 0730) Vital Signs (24h Range):  Temp:  [97.4 °F (36.3 °C)-98 °F (36.7 °C)] 98 °F (36.7 °C)  Pulse:  [42-59] 48  Resp:  [12-20]  14  SpO2:  [92 %-98 %] 92 %  BP: ()/(41-67) 92/51     Weight: 81.7 kg (180 lb 1.9 oz)  Body mass index is 29.97 kg/m².    Intake/Output - Last 3 Shifts       01/26 0700 - 01/27 0659 01/27 0700 - 01/28 0659 01/28 0700 - 01/29 0659    IV Piggyback  150     Total Intake(mL/kg)  150 (1.8)     Urine (mL/kg/hr)  1350     Total Output  1350     Net  -1200                  Physical Exam   Constitutional: She is oriented to person, place, and time.   Lying in hospital bed  Appears mildly uncomfortable  No diaphoresis   HENT:   Head: Normocephalic and atraumatic.   Eyes: EOM are normal.   Neck: Normal range of motion.   Cardiovascular: Normal rate, regular rhythm and intact distal pulses.   Pulmonary/Chest: Effort normal. No respiratory distress. She has no wheezes.   Abdominal:   Soft  Mild distention  Generalized abdominal tenderness - worse in bilateral lower lower quadrant  No rebound  Some voluntary guarding but distractible  No peritoneal signs   Musculoskeletal: She exhibits no edema or deformity.   Neurological: She is alert and oriented to person, place, and time.   Skin: Skin is warm and dry. No rash noted. No erythema.   Psychiatric: She has a normal mood and affect. Her behavior is normal.   Nursing note and vitals reviewed.      Significant Labs:  CBC:   Recent Labs   Lab 01/28/20  0248   WBC 4.44   RBC 3.50*   HGB 9.5*   HCT 31.3*      MCV 89   MCH 27.1   MCHC 30.4*     CMP:   Recent Labs   Lab 01/28/20  0248   GLU 87   CALCIUM 8.6*   ALBUMIN 3.1*   PROT 5.8*      K 3.7   CO2 32*      BUN 15   CREATININE 0.8   ALKPHOS 87   ALT 7*   AST 12   BILITOT 0.3       Significant Diagnostics:  Pending

## 2020-01-28 NOTE — ASSESSMENT & PLAN NOTE
Narcotic dependency    - Patient currently has dilaudid pump with daily dose increased to 3.67 mg dilaudid daily  - Also uses Norco  mg at home for breakthrough pain  - Will keep morphine prn for severe breakthrough pain  - prn narcan for opioid reversal   - biscodyl suppository for opioid induced constipation, may need enema as she takes large amounts of laxatives at home

## 2020-01-29 ENCOUNTER — TELEPHONE (OUTPATIENT)
Dept: CARDIOLOGY | Facility: CLINIC | Age: 65
End: 2020-01-29

## 2020-01-29 ENCOUNTER — TELEPHONE (OUTPATIENT)
Dept: INTERNAL MEDICINE | Facility: CLINIC | Age: 65
End: 2020-01-29

## 2020-01-29 LAB
ALBUMIN SERPL BCP-MCNC: 3 G/DL (ref 3.5–5.2)
ALP SERPL-CCNC: 78 U/L (ref 55–135)
ALT SERPL W/O P-5'-P-CCNC: 8 U/L (ref 10–44)
ANION GAP SERPL CALC-SCNC: 5 MMOL/L (ref 8–16)
AST SERPL-CCNC: 13 U/L (ref 10–40)
BASOPHILS # BLD AUTO: 0.02 K/UL (ref 0–0.2)
BASOPHILS NFR BLD: 0.5 % (ref 0–1.9)
BILIRUB SERPL-MCNC: 0.3 MG/DL (ref 0.1–1)
BUN SERPL-MCNC: 10 MG/DL (ref 8–23)
CALCIUM SERPL-MCNC: 8.1 MG/DL (ref 8.7–10.5)
CHLORIDE SERPL-SCNC: 103 MMOL/L (ref 95–110)
CO2 SERPL-SCNC: 32 MMOL/L (ref 23–29)
CREAT SERPL-MCNC: 0.7 MG/DL (ref 0.5–1.4)
DIFFERENTIAL METHOD: ABNORMAL
EOSINOPHIL # BLD AUTO: 0.2 K/UL (ref 0–0.5)
EOSINOPHIL NFR BLD: 5.5 % (ref 0–8)
ERYTHROCYTE [DISTWIDTH] IN BLOOD BY AUTOMATED COUNT: 13.3 % (ref 11.5–14.5)
EST. GFR  (AFRICAN AMERICAN): >60 ML/MIN/1.73 M^2
EST. GFR  (NON AFRICAN AMERICAN): >60 ML/MIN/1.73 M^2
GLUCOSE SERPL-MCNC: 89 MG/DL (ref 70–110)
HCT VFR BLD AUTO: 31.1 % (ref 37–48.5)
HGB BLD-MCNC: 10 G/DL (ref 12–16)
IMM GRANULOCYTES # BLD AUTO: 0.01 K/UL (ref 0–0.04)
IMM GRANULOCYTES NFR BLD AUTO: 0.2 % (ref 0–0.5)
LACTATE SERPL-SCNC: 1.7 MMOL/L (ref 0.5–2.2)
LYMPHOCYTES # BLD AUTO: 1.4 K/UL (ref 1–4.8)
LYMPHOCYTES NFR BLD: 32.7 % (ref 18–48)
MAGNESIUM SERPL-MCNC: 1.9 MG/DL (ref 1.6–2.6)
MCH RBC QN AUTO: 28.2 PG (ref 27–31)
MCHC RBC AUTO-ENTMCNC: 32.2 G/DL (ref 32–36)
MCV RBC AUTO: 88 FL (ref 82–98)
MONOCYTES # BLD AUTO: 0.3 K/UL (ref 0.3–1)
MONOCYTES NFR BLD: 8.1 % (ref 4–15)
NEUTROPHILS # BLD AUTO: 2.2 K/UL (ref 1.8–7.7)
NEUTROPHILS NFR BLD: 53 % (ref 38–73)
NRBC BLD-RTO: 0 /100 WBC
PHOSPHATE SERPL-MCNC: 2.8 MG/DL (ref 2.7–4.5)
PLATELET # BLD AUTO: 176 K/UL (ref 150–350)
PMV BLD AUTO: 10.6 FL (ref 9.2–12.9)
POTASSIUM SERPL-SCNC: 3.6 MMOL/L (ref 3.5–5.1)
PROT SERPL-MCNC: 5.6 G/DL (ref 6–8.4)
RBC # BLD AUTO: 3.55 M/UL (ref 4–5.4)
SODIUM SERPL-SCNC: 140 MMOL/L (ref 136–145)
WBC # BLD AUTO: 4.19 K/UL (ref 3.9–12.7)

## 2020-01-29 PROCEDURE — 99233 SBSQ HOSP IP/OBS HIGH 50: CPT | Mod: HCNC,GC,, | Performed by: HOSPITALIST

## 2020-01-29 PROCEDURE — S0030 INJECTION, METRONIDAZOLE: HCPCS | Mod: HCNC | Performed by: STUDENT IN AN ORGANIZED HEALTH CARE EDUCATION/TRAINING PROGRAM

## 2020-01-29 PROCEDURE — C9113 INJ PANTOPRAZOLE SODIUM, VIA: HCPCS | Mod: HCNC | Performed by: STUDENT IN AN ORGANIZED HEALTH CARE EDUCATION/TRAINING PROGRAM

## 2020-01-29 PROCEDURE — 63600175 PHARM REV CODE 636 W HCPCS: Mod: HCNC | Performed by: STUDENT IN AN ORGANIZED HEALTH CARE EDUCATION/TRAINING PROGRAM

## 2020-01-29 PROCEDURE — 99233 PR SUBSEQUENT HOSPITAL CARE,LEVL III: ICD-10-PCS | Mod: HCNC,GC,, | Performed by: HOSPITALIST

## 2020-01-29 PROCEDURE — 80053 COMPREHEN METABOLIC PANEL: CPT | Mod: HCNC

## 2020-01-29 PROCEDURE — 83735 ASSAY OF MAGNESIUM: CPT | Mod: HCNC

## 2020-01-29 PROCEDURE — 84100 ASSAY OF PHOSPHORUS: CPT | Mod: HCNC

## 2020-01-29 PROCEDURE — 11000001 HC ACUTE MED/SURG PRIVATE ROOM: Mod: HCNC

## 2020-01-29 PROCEDURE — 83605 ASSAY OF LACTIC ACID: CPT | Mod: HCNC

## 2020-01-29 PROCEDURE — 85025 COMPLETE CBC W/AUTO DIFF WBC: CPT | Mod: HCNC

## 2020-01-29 PROCEDURE — 25000003 PHARM REV CODE 250: Mod: HCNC | Performed by: STUDENT IN AN ORGANIZED HEALTH CARE EDUCATION/TRAINING PROGRAM

## 2020-01-29 PROCEDURE — 36415 COLL VENOUS BLD VENIPUNCTURE: CPT | Mod: HCNC

## 2020-01-29 RX ORDER — MORPHINE SULFATE 2 MG/ML
6 INJECTION, SOLUTION INTRAMUSCULAR; INTRAVENOUS
Status: DISCONTINUED | OUTPATIENT
Start: 2020-01-29 | End: 2020-01-30

## 2020-01-29 RX ORDER — PANTOPRAZOLE SODIUM 40 MG/1
40 TABLET, DELAYED RELEASE ORAL DAILY
Status: DISCONTINUED | OUTPATIENT
Start: 2020-01-30 | End: 2020-01-31 | Stop reason: HOSPADM

## 2020-01-29 RX ORDER — POLYETHYLENE GLYCOL 3350 17 G/17G
17 POWDER, FOR SOLUTION ORAL DAILY
Status: DISCONTINUED | OUTPATIENT
Start: 2020-01-29 | End: 2020-01-31 | Stop reason: HOSPADM

## 2020-01-29 RX ADMIN — METRONIDAZOLE 500 MG: 500 INJECTION, SOLUTION INTRAVENOUS at 05:01

## 2020-01-29 RX ADMIN — MORPHINE SULFATE 6 MG: 2 INJECTION, SOLUTION INTRAMUSCULAR; INTRAVENOUS at 11:01

## 2020-01-29 RX ADMIN — PANTOPRAZOLE SODIUM 40 MG: 40 INJECTION, POWDER, FOR SOLUTION INTRAVENOUS at 09:01

## 2020-01-29 RX ADMIN — CEFTRIAXONE 2 G: 2 INJECTION, SOLUTION INTRAVENOUS at 09:01

## 2020-01-29 RX ADMIN — POLYETHYLENE GLYCOL 3350 17 G: 17 POWDER, FOR SOLUTION ORAL at 03:01

## 2020-01-29 RX ADMIN — MORPHINE SULFATE 4 MG: 4 INJECTION, SOLUTION INTRAMUSCULAR; INTRAVENOUS at 09:01

## 2020-01-29 RX ADMIN — LORAZEPAM 0.5 MG: 2 INJECTION INTRAMUSCULAR; INTRAVENOUS at 09:01

## 2020-01-29 RX ADMIN — MORPHINE SULFATE 6 MG: 2 INJECTION, SOLUTION INTRAMUSCULAR; INTRAVENOUS at 12:01

## 2020-01-29 RX ADMIN — LORAZEPAM 0.5 MG: 2 INJECTION INTRAMUSCULAR; INTRAVENOUS at 03:01

## 2020-01-29 RX ADMIN — ENOXAPARIN SODIUM 40 MG: 100 INJECTION SUBCUTANEOUS at 06:01

## 2020-01-29 RX ADMIN — PROMETHAZINE HYDROCHLORIDE 6.25 MG: 25 INJECTION INTRAMUSCULAR; INTRAVENOUS at 12:01

## 2020-01-29 RX ADMIN — METRONIDAZOLE 500 MG: 500 INJECTION, SOLUTION INTRAVENOUS at 03:01

## 2020-01-29 RX ADMIN — MORPHINE SULFATE 6 MG: 2 INJECTION, SOLUTION INTRAMUSCULAR; INTRAVENOUS at 06:01

## 2020-01-29 RX ADMIN — METRONIDAZOLE 500 MG: 500 INJECTION, SOLUTION INTRAVENOUS at 11:01

## 2020-01-29 NOTE — PROGRESS NOTES
GENERAL SURGERY PROGRESS NOTE    Patient seen and examined at bedside. Reports ongoing generalized abdominal pain. Contrast challenge yesterday demonstrated rapid transit of contrast to the colon. Patient's CT and subsequent investigation more consistent with constipation rather than mechanical obstruction. Recommend aggressive bowel care regimen with stool softeners, suppositories, and enemas. Please call with any questions or concerns.      Rubin Mendez MD  Surgery Resident, PGY-V  Pager: [phone]  1/29/2020 10:05 AM

## 2020-01-29 NOTE — PLAN OF CARE
Problem: Adult Inpatient Plan of Care  Goal: Plan of Care Review  Outcome: Ongoing, Progressing  Patient remains npo per orders, Medicated once for pain to her back. Heart rate has been between 42-51, her sbp has been maintained above 180. No falls or injuries.

## 2020-01-29 NOTE — TELEPHONE ENCOUNTER
----- Message from Tonie Arredondo sent at 1/29/2020 10:46 AM CST -----  Contact: Pt self Mobile [phone]  Patient is returning a phone call.  Who left a message for the patient:   Does patient know what this is regarding:    Comments: Patient said she received a message on yesterday for her to return your call. Patient would like a call back please.

## 2020-01-29 NOTE — SUBJECTIVE & OBJECTIVE
Interval History: General surgery was consulted; contrast challenge demonstrated rapid transit of contrast to the colon, not concerning for obstruction.     Suspect abdominal pain related to opioid-induced constipation; miralax started.     Mesenteric ultrasound obtained due to rule out mesenteric ischemia.     Review of Systems   Constitutional: Negative for fever and unexpected weight change.   HENT: Positive for hearing loss (mild bilateral hearing loss (chronic)). Negative for congestion and sore throat.    Respiratory: Positive for shortness of breath (occasional). Negative for cough and chest tightness.    Cardiovascular: Negative for palpitations.   Gastrointestinal: Positive for abdominal distention, abdominal pain (left sided) and constipation. Negative for blood in stool, diarrhea, nausea and vomiting.   Genitourinary: Positive for flank pain (left sided).   Musculoskeletal: Positive for back pain. Negative for arthralgias and myalgias.   Skin: Negative for color change, pallor and rash.   Neurological: Positive for weakness. Negative for dizziness and headaches.     Objective:     Vital Signs (Most Recent):  Temp: 97.8 °F (36.6 °C) (01/29/20 1529)  Pulse: (!) 46 (01/29/20 1529)  Resp: 16 (01/29/20 1529)  BP: (!) 99/56 (01/29/20 1529)  SpO2: (!) 94 % (01/29/20 1529) Vital Signs (24h Range):  Temp:  [97.8 °F (36.6 °C)-98.5 °F (36.9 °C)] 97.8 °F (36.6 °C)  Pulse:  [44-54] 46  Resp:  [16-18] 16  SpO2:  [91 %-94 %] 94 %  BP: ()/(51-59) 99/56     Weight: 81.6 kg (180 lb)  Body mass index is 29.95 kg/m².    Intake/Output Summary (Last 24 hours) at 1/29/2020 1754  Last data filed at 1/29/2020 1543  Gross per 24 hour   Intake 1161.25 ml   Output 3125 ml   Net -1963.75 ml      Physical Exam   Constitutional: She is oriented to person, place, and time. She appears well-developed and well-nourished. No distress.   HENT:   Head: Normocephalic and atraumatic.   Eyes: Pupils are equal, round, and reactive to  light. Conjunctivae and EOM are normal. No scleral icterus.   Neck: Normal range of motion. Neck supple. No JVD present.   Cardiovascular: Regular rhythm, normal heart sounds and intact distal pulses. Exam reveals no friction rub.   No murmur heard.  bradycardic   Pulmonary/Chest: Effort normal and breath sounds normal. No respiratory distress. She has no wheezes. She has no rales.   Abdominal: Soft. Bowel sounds are normal. She exhibits no distension. There is tenderness (left upper and lower quadrant).   Musculoskeletal: Normal range of motion. She exhibits edema (bilateral lower extremity, non-pitting) and tenderness. She exhibits no deformity.   Neurological: She is alert and oriented to person, place, and time.   Skin: Skin is warm and dry.   Psychiatric: She has a normal mood and affect. Her behavior is normal.   Vitals reviewed.      Significant Labs:   CBC:   Recent Labs   Lab 01/28/20 0248 01/29/20  0319   WBC 4.44 4.19   HGB 9.5* 10.0*   HCT 31.3* 31.1*    176     CMP:   Recent Labs   Lab 01/28/20 0248 01/29/20  0319    140   K 3.7 3.6    103   CO2 32* 32*   GLU 87 89   BUN 15 10   CREATININE 0.8 0.7   CALCIUM 8.6* 8.1*   PROT 5.8* 5.6*   ALBUMIN 3.1* 3.0*   BILITOT 0.3 0.3   ALKPHOS 87 78   AST 12 13   ALT 7* 8*   ANIONGAP 8 5*   EGFRNONAA >60.0 >60.0     Lactic Acid:   Recent Labs   Lab 01/29/20  1215   LACTATE 1.7     All pertinent labs within the past 24 hours have been reviewed.    Significant Imaging: I have reviewed all pertinent imaging results/findings within the past 24 hours.   X-ray abdomen:    FINDINGS:  One view: There is a baclofen pump in a posterior column stimulator.  There is postoperative change of the spine and scoliosis.  There is contrast in the colon.  No obstruction, ileus or perforation seen.      Electronically signed by: Donavan Heredia MD  Date: 01/29/2020  Time: 07:45

## 2020-01-29 NOTE — TELEPHONE ENCOUNTER
Pt called stating she wants dr cruz to see her inpatient.             ----- Message from Camilla Domingo MA sent at 1/28/2020  9:56 AM CST -----  INPT on 01/30/2020. Check to see when she's getting discharged. Pt stated she would call to rs when she's out of the hospitl.

## 2020-01-29 NOTE — TELEPHONE ENCOUNTER
called and spoke with pt. She received a call yesterday from cardiology clinic, Dr latha Haney to reschedule appt. Pt in hospital and wanted Dr Haney to see her while in hospital.    Thanks     Carrie.

## 2020-01-29 NOTE — HOSPITAL COURSE
Admitted on 1/27/2020 for acute abdominal pain; initial CT abdomen concerning for partial bowel obstruction. General surgery was consulted; contrast challenge demonstrated rapid transit of contrast to the colon, not concerning for obstruction. PO home medications were restarted and patient tolerated advancing diet. Suspect abdominal pain related to opioid-induced constipation. Patient treated with Lactulose 30mg TID with one BM along with Miralax and bisacodyl. Patient received Rocephin and Metronidazole which was transitioned to PO Augmentin for a total of 7 days of antibiotic coverage for Diverticulitis. Patient remained HD stable, afebrile, and without leukocytosis. US mesenteric was without ischemic changes and US LE B/L was without DVT. Patient to follow up GI, pain specialist, and family doctor for pain management and opioid induced constipation. GI for any further outpatient workup for inflammatory enteritis found on CT.

## 2020-01-29 NOTE — MEDICAL/APP STUDENT
Hospital Medicine  Progress note    [place] [facility] Anand Lloyd  Admit Date: 1/27/2020  JACKIE 1/31/2020  Length of Stay:  LOS: 2 days   Code status: Full Code    Principal Problem:  Partial small bowel obstruction    HPI: Patient is a 64 year old female with CAD, hypothyroid, sleep apnea, arthritis, neurogenic bladder with meadows at home (changed every 3 weeks), and chronic back pain since a work accident in 1997 (has had 12 back surgeries) on dilaudid pump who presents to hospital with complaints of back pain; also complains of shortness of breath. She reports that her pain started 3 days ago without particular inciting incident. She sat down as she felt a sharp pain followed by shortness of breath. At the time, she took her pulse and blood pressure; she does this at home to make sure she is tolerating her dilaudid pump. She found that she was hypotensive at 90's/60's and was bradycardic with pulse between 40-50. Initially she waited to see if the pain would resolve but it continued to get worse. She finally came to the hospital when her home health nurse took her vitals and noted the bradycardia. Of note, patient was treated for UTI that occurred 2 weeks ago; she finished a 7 days course of antibiotics (initially treated with amoxicillin but switched to omnicef due to previous resistant proteus). In addition, she was to follow up with cardiology on the 30th with echo as well as meet with vascular surgery for her chronic leg swelling. Her previous echo in February of 2019 had normal systolic and diastolic function. She also has recently had a 10% increase to her given dilaudid dose; now gets 3.67 mg daily.      In ED, her cbc showed a mild anemia of 10.8 (chronic problem) and no leukocytosis. CMP was grossly normal. Cardiac work up was negative with a negative BNP, mild increase in troponin to .033, and ECG showing sinus bradycardia. Her urine was negative for UTI, lipase and lactic acid wnl. Imaging was  significant for a CT abdomen which showed nonspecific findings , with differential including developing partial small bowel obstruction on the basis of adhesion versus nonspecific infectious or inflammatory enteritis. At this point patient had already been seen by general surgery who recommend non-operative management for now, and gastrografin scan; they recommend against NG tube as well.      At the time of my exam, patient endorses some mild light headedness, lower chest pain, left sided abdominal pain, diffuse back pain, leg swelling, constipation,  hematuria (chronic problem), occasional FRBPR due to hemorrhoids and chills. She denies dizziness, headaches, shortness of breath, cough, diarrhea, vomiting, or fevers. Her only sick contact was her  with some resolved URI symptoms. She reports that her last full meal was yesterday which she tolerated without issue and that she has proper fluid intake.     Overview/Hospital Course:  Admitted on 1/27/2020 for acute abdominal pain; initial CT abdomen concerning for partial bowel obstruction. General surgery was consulted; contrast challenge demonstrated rapid transit of contrast to the colon, not concerning for obstruction.      Suspect abdominal pain related to opioid-induced constipation; miralax started.     Interval hx: Ms Patel endorsed sharp pain at left lower quandrant of abdomen. Denies bowel movement. She mentioned pain is 10/10. She added that morphine helps bring pain down to 8/10 which is tolerable for her.     General surgery consult ruled out abdominal obstruction. Ultrasound was obtained to consider mesenteric ischemia.    ROS:    HENT: Positive for hearing loss (relatively more significant on right side)  Respiratory: neg for cough neg for shortness of breath  Cardiovascular: neg for chest pain neg for palpitations  Gastrointestinal: Positive for abdominal distention, left-sided abdominal pain, and constipation. Negative for nausea,  vomiting, and diarrhea.  Behavioral/Psych: neg for depression neg for anxiety    PEx  Temp:  [97.8 °F (36.6 °C)-98.5 °F (36.9 °C)]   Pulse:  [44-51]   Resp:  [15-18]   BP: ()/(51-67)   SpO2:  [91 %-94 %]     Intake/Output Summary (Last 24 hours) at 1/29/2020 2106  Last data filed at 1/29/2020 1848  Gross per 24 hour   Intake 1161.25 ml   Output 3050 ml   Net -1888.75 ml     Vitals:    01/29/20 2135   BP: (!) 97/47   Pulse:    Resp:    Temp:      General Appearance: no acute distress, alert, interactive.  Heart: regular rate (Exam limited due to telemetry in place). Bilateral lymphedema of lower extremities that is tender  Respiratory: Normal respiratory effort, pain on taking deep breaths while sitting up, breath sounds clear bilaterally  Abdomen: bowel sounds appreciated.  Skin: intact.Skin intact, no rashes  Neurologic: weakness,   Mental status: Alert, oriented x 4, affect appropriate     Recent Labs   Lab 01/27/20 1425 01/28/20 0248 01/29/20 0319   WBC 5.80 4.44 4.19   HGB 10.8* 9.5* 10.0*   HCT 35.5* 31.3* 31.1*    170 176     Recent Labs   Lab 01/27/20 1425 01/28/20 0248 01/29/20 0319    140 140   K 3.6 3.7 3.6   CL 96 100 103   CO2 31* 32* 32*   BUN 15 15 10   CREATININE 1.1 0.8 0.7   GLU 76 87 89   CALCIUM 9.5 8.6* 8.1*   MG  --  1.9 1.9   PHOS  --  3.2 2.8   LIPASE 15  --   --      Recent Labs   Lab 01/27/20 1425 01/28/20 0248 01/29/20 0319   ALKPHOS 111 87 78   ALT 12 7* 8*   AST 17 12 13   ALBUMIN 4.0 3.1* 3.0*   PROT 7.3 5.8* 5.6*   BILITOT 0.3 0.3 0.3        No results for input(s): POCTGLUCOSE in the last 168 hours.      Assessment and Plan  / Problems managed today    Left lower quandrant pain -  - continuing antibiotic course  - Obstruction resolved (per CT), while pain remains. Ruled out SMA via ultrasound. Suspecting opioid induced constipation. Miralax started. Will add lactulose if no improvement.    Lymphedema of both lower extremities -   - Chronic condition  -  Scheduled to see vascular on 1/30  - Normal BNP  - Will hold off on US of lower extremity. Last done 1 year ago without findings.     Primary hypothyroidism  - continue home levothyroxine when patient can tolerate PO    Neurogenic bladder  - chronic meadows, switched every 3 weeks    CAD involving coronary artery of native heart without angina pectoris. Hyperlipidemia  - home meds are ASA 81 mg and statin  - continue when PO can be tolerated    GERD  - 40 mg pantoprazole at home, restarted    GONZALO  - continue home seroquel when patient can tolerate PO    Discharge plan:    Time (minutes) spent in care of the patient (Greater than 1/2 spent in direct face-to-face contact)      Anand Lloyd MSY3  [employer]  [facility]

## 2020-01-30 PROBLEM — T40.2X5A CONSTIPATION DUE TO OPIOID THERAPY: Status: ACTIVE | Noted: 2020-01-30

## 2020-01-30 PROBLEM — K59.03 CONSTIPATION DUE TO OPIOID THERAPY: Status: ACTIVE | Noted: 2020-01-30

## 2020-01-30 LAB
ALBUMIN SERPL BCP-MCNC: 3.1 G/DL (ref 3.5–5.2)
ALP SERPL-CCNC: 81 U/L (ref 55–135)
ALT SERPL W/O P-5'-P-CCNC: 6 U/L (ref 10–44)
ANION GAP SERPL CALC-SCNC: 9 MMOL/L (ref 8–16)
AST SERPL-CCNC: 13 U/L (ref 10–40)
BASOPHILS # BLD AUTO: 0.02 K/UL (ref 0–0.2)
BASOPHILS NFR BLD: 0.5 % (ref 0–1.9)
BILIRUB SERPL-MCNC: 0.2 MG/DL (ref 0.1–1)
BUN SERPL-MCNC: 4 MG/DL (ref 8–23)
CALCIUM SERPL-MCNC: 8.5 MG/DL (ref 8.7–10.5)
CHLORIDE SERPL-SCNC: 106 MMOL/L (ref 95–110)
CO2 SERPL-SCNC: 28 MMOL/L (ref 23–29)
CREAT SERPL-MCNC: 0.8 MG/DL (ref 0.5–1.4)
DIFFERENTIAL METHOD: ABNORMAL
EOSINOPHIL # BLD AUTO: 0.3 K/UL (ref 0–0.5)
EOSINOPHIL NFR BLD: 7.5 % (ref 0–8)
ERYTHROCYTE [DISTWIDTH] IN BLOOD BY AUTOMATED COUNT: 13.5 % (ref 11.5–14.5)
EST. GFR  (AFRICAN AMERICAN): >60 ML/MIN/1.73 M^2
EST. GFR  (NON AFRICAN AMERICAN): >60 ML/MIN/1.73 M^2
GLUCOSE SERPL-MCNC: 84 MG/DL (ref 70–110)
HCT VFR BLD AUTO: 32.8 % (ref 37–48.5)
HGB BLD-MCNC: 10.1 G/DL (ref 12–16)
IMM GRANULOCYTES # BLD AUTO: 0.03 K/UL (ref 0–0.04)
IMM GRANULOCYTES NFR BLD AUTO: 0.7 % (ref 0–0.5)
LYMPHOCYTES # BLD AUTO: 1.5 K/UL (ref 1–4.8)
LYMPHOCYTES NFR BLD: 33.9 % (ref 18–48)
MAGNESIUM SERPL-MCNC: 1.9 MG/DL (ref 1.6–2.6)
MCH RBC QN AUTO: 27.7 PG (ref 27–31)
MCHC RBC AUTO-ENTMCNC: 30.8 G/DL (ref 32–36)
MCV RBC AUTO: 90 FL (ref 82–98)
MONOCYTES # BLD AUTO: 0.5 K/UL (ref 0.3–1)
MONOCYTES NFR BLD: 11.2 % (ref 4–15)
NEUTROPHILS # BLD AUTO: 2 K/UL (ref 1.8–7.7)
NEUTROPHILS NFR BLD: 46.2 % (ref 38–73)
NRBC BLD-RTO: 0 /100 WBC
PHOSPHATE SERPL-MCNC: 2.8 MG/DL (ref 2.7–4.5)
PLATELET # BLD AUTO: 179 K/UL (ref 150–350)
PMV BLD AUTO: 10.7 FL (ref 9.2–12.9)
POTASSIUM SERPL-SCNC: 3.9 MMOL/L (ref 3.5–5.1)
PROT SERPL-MCNC: 5.8 G/DL (ref 6–8.4)
RBC # BLD AUTO: 3.65 M/UL (ref 4–5.4)
SODIUM SERPL-SCNC: 143 MMOL/L (ref 136–145)
WBC # BLD AUTO: 4.28 K/UL (ref 3.9–12.7)

## 2020-01-30 PROCEDURE — 25000003 PHARM REV CODE 250: Mod: HCNC | Performed by: STUDENT IN AN ORGANIZED HEALTH CARE EDUCATION/TRAINING PROGRAM

## 2020-01-30 PROCEDURE — 80053 COMPREHEN METABOLIC PANEL: CPT | Mod: HCNC

## 2020-01-30 PROCEDURE — S0030 INJECTION, METRONIDAZOLE: HCPCS | Mod: HCNC | Performed by: STUDENT IN AN ORGANIZED HEALTH CARE EDUCATION/TRAINING PROGRAM

## 2020-01-30 PROCEDURE — 97116 GAIT TRAINING THERAPY: CPT | Mod: HCNC

## 2020-01-30 PROCEDURE — 99232 SBSQ HOSP IP/OBS MODERATE 35: CPT | Mod: HCNC,GC,, | Performed by: HOSPITALIST

## 2020-01-30 PROCEDURE — 99900035 HC TECH TIME PER 15 MIN (STAT): Mod: HCNC

## 2020-01-30 PROCEDURE — 97165 OT EVAL LOW COMPLEX 30 MIN: CPT | Mod: HCNC

## 2020-01-30 PROCEDURE — 97535 SELF CARE MNGMENT TRAINING: CPT | Mod: HCNC

## 2020-01-30 PROCEDURE — 94761 N-INVAS EAR/PLS OXIMETRY MLT: CPT | Mod: HCNC

## 2020-01-30 PROCEDURE — 63600175 PHARM REV CODE 636 W HCPCS: Mod: HCNC | Performed by: STUDENT IN AN ORGANIZED HEALTH CARE EDUCATION/TRAINING PROGRAM

## 2020-01-30 PROCEDURE — 11000001 HC ACUTE MED/SURG PRIVATE ROOM: Mod: HCNC

## 2020-01-30 PROCEDURE — 83735 ASSAY OF MAGNESIUM: CPT | Mod: HCNC

## 2020-01-30 PROCEDURE — 36415 COLL VENOUS BLD VENIPUNCTURE: CPT | Mod: HCNC

## 2020-01-30 PROCEDURE — 99232 PR SUBSEQUENT HOSPITAL CARE,LEVL II: ICD-10-PCS | Mod: HCNC,GC,, | Performed by: HOSPITALIST

## 2020-01-30 PROCEDURE — 85025 COMPLETE CBC W/AUTO DIFF WBC: CPT | Mod: HCNC

## 2020-01-30 PROCEDURE — 84100 ASSAY OF PHOSPHORUS: CPT | Mod: HCNC

## 2020-01-30 RX ORDER — MORPHINE SULFATE 2 MG/ML
6 INJECTION, SOLUTION INTRAMUSCULAR; INTRAVENOUS EVERY 6 HOURS PRN
Status: DISCONTINUED | OUTPATIENT
Start: 2020-01-30 | End: 2020-01-31

## 2020-01-30 RX ORDER — LACTULOSE 10 G/15ML
30 SOLUTION ORAL 3 TIMES DAILY
Status: DISCONTINUED | OUTPATIENT
Start: 2020-01-30 | End: 2020-01-30

## 2020-01-30 RX ORDER — QUETIAPINE FUMARATE 100 MG/1
100 TABLET, FILM COATED ORAL NIGHTLY
Status: DISCONTINUED | OUTPATIENT
Start: 2020-01-30 | End: 2020-01-31

## 2020-01-30 RX ORDER — LEVOTHYROXINE SODIUM 125 UG/1
125 TABLET ORAL
Status: CANCELLED | OUTPATIENT
Start: 2020-01-31

## 2020-01-30 RX ORDER — FLUOXETINE HYDROCHLORIDE 20 MG/1
60 CAPSULE ORAL DAILY
Status: DISCONTINUED | OUTPATIENT
Start: 2020-01-30 | End: 2020-01-31 | Stop reason: HOSPADM

## 2020-01-30 RX ORDER — LACTULOSE 10 G/15ML
30 SOLUTION ORAL 3 TIMES DAILY
Status: DISCONTINUED | OUTPATIENT
Start: 2020-01-30 | End: 2020-01-31 | Stop reason: HOSPADM

## 2020-01-30 RX ORDER — ATORVASTATIN CALCIUM 20 MG/1
80 TABLET, FILM COATED ORAL DAILY
Status: DISCONTINUED | OUTPATIENT
Start: 2020-01-30 | End: 2020-01-31 | Stop reason: HOSPADM

## 2020-01-30 RX ORDER — LAMOTRIGINE 25 MG/1
25 TABLET ORAL DAILY
Status: DISCONTINUED | OUTPATIENT
Start: 2020-01-30 | End: 2020-01-30

## 2020-01-30 RX ORDER — TRAZODONE HYDROCHLORIDE 100 MG/1
300 TABLET ORAL NIGHTLY
Status: DISCONTINUED | OUTPATIENT
Start: 2020-01-30 | End: 2020-01-31

## 2020-01-30 RX ORDER — AMOXICILLIN AND CLAVULANATE POTASSIUM 875; 125 MG/1; MG/1
1 TABLET, FILM COATED ORAL EVERY 12 HOURS
Status: DISCONTINUED | OUTPATIENT
Start: 2020-01-30 | End: 2020-01-31 | Stop reason: HOSPADM

## 2020-01-30 RX ORDER — METRONIDAZOLE 500 MG/1
500 TABLET ORAL EVERY 8 HOURS
Status: DISCONTINUED | OUTPATIENT
Start: 2020-01-30 | End: 2020-01-30

## 2020-01-30 RX ORDER — LEVOTHYROXINE SODIUM 125 UG/1
125 TABLET ORAL
Status: DISCONTINUED | OUTPATIENT
Start: 2020-01-31 | End: 2020-01-31 | Stop reason: HOSPADM

## 2020-01-30 RX ADMIN — POLYETHYLENE GLYCOL 3350 17 G: 17 POWDER, FOR SOLUTION ORAL at 08:01

## 2020-01-30 RX ADMIN — METRONIDAZOLE 500 MG: 500 INJECTION, SOLUTION INTRAVENOUS at 05:01

## 2020-01-30 RX ADMIN — MORPHINE SULFATE 6 MG: 2 INJECTION, SOLUTION INTRAMUSCULAR; INTRAVENOUS at 02:01

## 2020-01-30 RX ADMIN — PANTOPRAZOLE SODIUM 40 MG: 40 TABLET, DELAYED RELEASE ORAL at 08:01

## 2020-01-30 RX ADMIN — TRAZODONE HYDROCHLORIDE 300 MG: 100 TABLET ORAL at 08:01

## 2020-01-30 RX ADMIN — MORPHINE SULFATE 6 MG: 2 INJECTION, SOLUTION INTRAMUSCULAR; INTRAVENOUS at 06:01

## 2020-01-30 RX ADMIN — LORAZEPAM 0.5 MG: 2 INJECTION INTRAMUSCULAR; INTRAVENOUS at 03:01

## 2020-01-30 RX ADMIN — ATORVASTATIN CALCIUM 80 MG: 20 TABLET, FILM COATED ORAL at 12:01

## 2020-01-30 RX ADMIN — MORPHINE SULFATE 6 MG: 2 INJECTION, SOLUTION INTRAMUSCULAR; INTRAVENOUS at 08:01

## 2020-01-30 RX ADMIN — QUETIAPINE FUMARATE 100 MG: 100 TABLET ORAL at 08:01

## 2020-01-30 RX ADMIN — AMOXICILLIN AND CLAVULANATE POTASSIUM 1 TABLET: 875; 125 TABLET, FILM COATED ORAL at 08:01

## 2020-01-30 RX ADMIN — LAMOTRIGINE 25 MG: 25 TABLET ORAL at 12:01

## 2020-01-30 RX ADMIN — LACTULOSE 30 G: 20 SOLUTION ORAL at 08:01

## 2020-01-30 RX ADMIN — MORPHINE SULFATE 6 MG: 2 INJECTION, SOLUTION INTRAMUSCULAR; INTRAVENOUS at 05:01

## 2020-01-30 RX ADMIN — LORAZEPAM 0.5 MG: 2 INJECTION INTRAMUSCULAR; INTRAVENOUS at 12:01

## 2020-01-30 RX ADMIN — FLUOXETINE 60 MG: 20 CAPSULE ORAL at 12:01

## 2020-01-30 RX ADMIN — METRONIDAZOLE 500 MG: 500 TABLET ORAL at 01:01

## 2020-01-30 RX ADMIN — ENOXAPARIN SODIUM 40 MG: 100 INJECTION SUBCUTANEOUS at 06:01

## 2020-01-30 NOTE — ASSESSMENT & PLAN NOTE
- Continue home levothyroxine  Thyroid Labs Latest Ref Rng & Units 1/28/2020 1/29/2020 1/30/2020   TSH 0.400 - 4.000 uIU/mL 6.572(H) - -   Free T4 0.71 - 1.51 ng/dL 0.97 - -   Sodium 136 - 145 mmol/L 140 140 143   Potassium 3.5 - 5.1 mmol/L 3.7 3.6 3.9   Chloride 95 - 110 mmol/L 100 103 106   Carbon Dioxide 23 - 29 mmol/L 32(H) 32(H) 28   Glucose 70 - 110 mg/dL 87 89 84   Blood Urea Nitrogen 8 - 23 mg/dL 15 10 4(L)   Creatinine 0.5 - 1.4 mg/dL 0.8 0.7 0.8   Calcium 8.7 - 10.5 mg/dL 8.6(L) 8.1(L) 8.5(L)   Total Protein 6.0 - 8.4 g/dL 5.8(L) 5.6(L) 5.8(L)   Albumin 3.5 - 5.2 g/dL 3.1(L) 3.0(L) 3.1(L)   Total Bilirubin 0.1 - 1.0 mg/dL 0.3 0.3 0.2   AST 10 - 40 U/L 12 13 13   ALT 10 - 44 U/L 7(L) 8(L) 6(L)   Anion Gap 8 - 16 mmol/L 8 5(L) 9   eGFR (African American) >60 mL/min/1.73 m:2 >60.0 >60.0 >60.0   eGFR (Non-African American) >60 mL/min/1.73 m:2 >60.0 >60.0 >60.0   WBC 3.90 - 12.70 K/uL 4.44 4.19 4.28   RBC 4.00 - 5.40 M/uL 3.50(L) 3.55(L) 3.65(L)   Hemoglobin 12.0 - 16.0 g/dL 9.5(L) 10.0(L) 10.1(L)   Hematocrit 37.0 - 48.5 % 31.3(L) 31.1(L) 32.8(L)   MCV 82 - 98 fL 89 88 90   MCH 27.0 - 31.0 pg 27.1 28.2 27.7   MCHC 32.0 - 36.0 g/dL 30.4(L) 32.2 30.8(L)   RDW 11.5 - 14.5 % 13.5 13.3 13.5   Platelets 150 - 350 K/uL 170 176 179   MPV 9.2 - 12.9 fL 10.9 10.6 10.7   Gran # 1.8 - 7.7 K/uL 1.9 2.2 2.0   Lymph # 1.0 - 4.8 K/uL 1.8 1.4 1.5   Mono # 0.3 - 1.0 K/uL 0.5 0.3 0.5   Eos # 0.0 - 0.5 K/uL 0.2 0.2 0.3   Baso # 0.00 - 0.20 K/uL 0.02 0.02 0.02   Gran % 38.0 - 73.0 % 42.1 53.0 46.2   Lymph % 18.0 - 48.0 % 40.8 32.7 33.9   Mono% 4.0 - 15.0 % 11.0 8.1 11.2   Eos % 0.0 - 8.0 % 5.4 5.5 7.5   Baso % 0.0 - 1.9 % 0.5 0.5 0.5   Prothrombin Time 9.0 - 12.5 sec - - -   INR 0.8 - 1.2 - - -   aPTT 21.0 - 32.0 sec - - -   Calcitonin <=7.6 pg/mL - - -

## 2020-01-30 NOTE — PLAN OF CARE
Problem: Occupational Therapy Goal  Goal: Occupational Therapy Goal  Description  Goals to be met by: 2/10/2020     Patient will increase functional independence with ADLs by performing:    UE Dressing with Set-up Assistance.  LE Dressing with Stand-by Assistance.  Grooming while standing with Stand-by Assistance.  Toileting from toilet with Stand-by Assistance for hygiene and clothing management.   Rolling to Bilateral with Modified [place].   Supine to sit with Modified [place].  Step transfer with Supervision  Toilet transfer to toilet with Supervision.   Evaluated pt and established OT POC.  Continue OT as tolerated.  Haven Sesay OT  1/30/2020    Outcome: Ongoing, Progressing

## 2020-01-30 NOTE — SUBJECTIVE & OBJECTIVE
Interval History: Patient not in distress this AM. Complains of Left sided abdominal pain that radiates around side to her back. Explained to patient that pain opioid medication is not benefiting her constipation and that we will begin to wean off of IV opioid in hospital. Small BM ON but not passing flatus. Patient wants to eat solid food. Some nausea without emesis overnight     Review of Systems   Constitutional: Negative for fever and unexpected weight change.   HENT: Positive for hearing loss (mild bilateral hearing loss (chronic, worse on the right)). Negative for congestion and sore throat.    Respiratory: Positive for shortness of breath (occasional). Negative for cough and chest tightness.    Cardiovascular: Negative for palpitations.   Gastrointestinal: Positive for abdominal distention, abdominal pain (left sided) and constipation. Negative for blood in stool, diarrhea, nausea and vomiting.   Genitourinary: Positive for flank pain (left sided).   Musculoskeletal: Positive for back pain. Negative for arthralgias and myalgias.   Skin: Negative for color change, pallor and rash.   Neurological: Positive for weakness. Negative for dizziness and headaches.     Objective:     Vital Signs (Most Recent):  Temp: 98.2 °F (36.8 °C) (01/30/20 1233)  Pulse: (!) 54 (01/30/20 1233)  Resp: 16 (01/30/20 1233)  BP: (!) 115/58 (01/30/20 1233)  SpO2: 98 % (01/30/20 1233) Vital Signs (24h Range):  Temp:  [97.5 °F (36.4 °C)-98.2 °F (36.8 °C)] 98.2 °F (36.8 °C)  Pulse:  [46-77] 54  Resp:  [15-16] 16  SpO2:  [94 %-98 %] 98 %  BP: ()/(47-67) 115/58     Weight: 87 kg (191 lb 12.8 oz)  Body mass index is 31.92 kg/m².    Intake/Output Summary (Last 24 hours) at 1/30/2020 1410  Last data filed at 1/29/2020 2015  Gross per 24 hour   Intake --   Output 2025 ml   Net -2025 ml      Physical Exam   Constitutional: She is oriented to person, place, and time. She appears well-developed and well-nourished. No distress.   HENT:   Head:  Normocephalic and atraumatic.   Eyes: Pupils are equal, round, and reactive to light. Conjunctivae and EOM are normal. No scleral icterus.   Neck: Normal range of motion. Neck supple. No JVD present.   Cardiovascular: Regular rhythm, normal heart sounds and intact distal pulses. Exam reveals no friction rub.   No murmur heard.  bradycardic   Pulmonary/Chest: Effort normal and breath sounds normal. No respiratory distress. She has no wheezes. She has no rales.   Abdominal: Soft. Bowel sounds are normal. She exhibits no distension. There is tenderness (left upper and lower quadrant).   Musculoskeletal: Normal range of motion. She exhibits edema (bilateral lower extremity, non-pitting) and tenderness. She exhibits no deformity.   Neurological: She is alert and oriented to person, place, and time.   Skin: Skin is warm and dry.   Psychiatric: She has a normal mood and affect. Her behavior is normal.   Vitals reviewed.      Significant Labs:   CBC:   Recent Labs   Lab 01/29/20  0319 01/30/20  0353   WBC 4.19 4.28   HGB 10.0* 10.1*   HCT 31.1* 32.8*    179       Significant Imaging: I have reviewed all pertinent imaging results/findings within the past 24 hours.

## 2020-01-30 NOTE — PROGRESS NOTES
[facility] Hospital Medicine  Progress Note    Patient Name: Tasha Hawley  MRN: [id number]  Patient Class: IP- Inpatient   Admission Date: 1/27/2020  Length of Stay: 2 days  Attending Physician: Raegan Randle MD  Primary Care Provider: Mesfin Hodges Ii, MD    [facility] Medicine Team: [facility] 1 Gifty Myrick MD    Subjective:     Principal Problem:Partial small bowel obstruction        HPI:  Patient is a 64 year old female with CAD, hypothyroid, sleep apnea, arthritis, neurogenic bladder with meadows at home (changed every 3 weeks), and chronic back pain since a work accident in 1997 (has had 12 back surgeries) on dilaudid pump who presents to hospital with complaints of back pain; also complains of shortness of breath. She reports that her pain started 3 days ago without particular inciting incident. She sat down as she felt a sharp pain followed by shortness of breath. At the time, she took her pulse and blood pressure; she does this at home to make sure she is tolerating her dilaudid pump. She found that she was hypotensive at 90's/60's and was bradycardic with pulse between 40-50. Initially she waited to see if the pain would resolve but it continued to get worse. She finally came to the hospital when her home health nurse took her vitals and noted the bradycardia. Of note, patient was treated for UTI that occurred 2 weeks ago; she finished a 7 days course of antibiotics (initially treated with amoxicillin but switched to omnicef due to previous resistant proteus). In addition, she was to follow up with cardiology on the 30th with echo as well as meet with vascular surgery for her chronic leg swelling. Her previous echo in February of 2019 had normal systolic and diastolic function. She also has recently had a 10% increase to her given dilaudid dose; now gets 3.67 mg daily.     In ED, her cbc showed a mild anemia of 10.8 (chronic problem) and no leukocytosis. CMP was grossly normal.  Cardiac work up was negative with a negative BNP, mild increase in troponin to .033, and ECG showing sinus bradycardia. Her urine was negative for UTI, lipase and lactic acid wnl. Imaging was significant for a CT abdomen which showed nonspecific findings , with differential including developing partial small bowel obstruction on the basis of adhesion versus nonspecific infectious or inflammatory enteritis. At this point patient had already been seen by general surgery who recommend non-operative management for now, and gastrografin scan; they recommend against NG tube as well.     At the time of my exam, patient endorses some mild light headedness, lower chest pain, left sided abdominal pain, diffuse back pain, leg swelling, constipation,  hematuria (chronic problem), occasional FRBPR due to hemorrhoids and chills. She denies dizziness, headaches, shortness of breath, cough, diarrhea, vomiting, or fevers. Her only sick contact was her  with some resolved URI symptoms. She reports that her last full meal was yesterday which she tolerated without issue and that she has proper fluid intake.    Overview/Hospital Course:  Admitted on 1/27/2020 for acute abdominal pain; initial CT abdomen concerning for partial bowel obstruction. General surgery was consulted; contrast challenge demonstrated rapid transit of contrast to the colon, not concerning for obstruction.     Suspect abdominal pain related to opioid-induced constipation; miralax started.     Interval History: General surgery was consulted; contrast challenge demonstrated rapid transit of contrast to the colon, not concerning for obstruction.     Suspect abdominal pain related to opioid-induced constipation; miralax started.     Mesenteric ultrasound obtained due to rule out mesenteric ischemia.     Review of Systems   Constitutional: Negative for fever and unexpected weight change.   HENT: Positive for hearing loss (mild bilateral hearing loss (chronic)).  Negative for congestion and sore throat.    Respiratory: Positive for shortness of breath (occasional). Negative for cough and chest tightness.    Cardiovascular: Negative for palpitations.   Gastrointestinal: Positive for abdominal distention, abdominal pain (left sided) and constipation. Negative for blood in stool, diarrhea, nausea and vomiting.   Genitourinary: Positive for flank pain (left sided).   Musculoskeletal: Positive for back pain. Negative for arthralgias and myalgias.   Skin: Negative for color change, pallor and rash.   Neurological: Positive for weakness. Negative for dizziness and headaches.     Objective:     Vital Signs (Most Recent):  Temp: 97.8 °F (36.6 °C) (01/29/20 1529)  Pulse: (!) 46 (01/29/20 1529)  Resp: 16 (01/29/20 1529)  BP: (!) 99/56 (01/29/20 1529)  SpO2: (!) 94 % (01/29/20 1529) Vital Signs (24h Range):  Temp:  [97.8 °F (36.6 °C)-98.5 °F (36.9 °C)] 97.8 °F (36.6 °C)  Pulse:  [44-54] 46  Resp:  [16-18] 16  SpO2:  [91 %-94 %] 94 %  BP: ()/(51-59) 99/56     Weight: 81.6 kg (180 lb)  Body mass index is 29.95 kg/m².    Intake/Output Summary (Last 24 hours) at 1/29/2020 1754  Last data filed at 1/29/2020 1543  Gross per 24 hour   Intake 1161.25 ml   Output 3125 ml   Net -1963.75 ml      Physical Exam   Constitutional: She is oriented to person, place, and time. She appears well-developed and well-nourished. No distress.   HENT:   Head: Normocephalic and atraumatic.   Eyes: Pupils are equal, round, and reactive to light. Conjunctivae and EOM are normal. No scleral icterus.   Neck: Normal range of motion. Neck supple. No JVD present.   Cardiovascular: Regular rhythm, normal heart sounds and intact distal pulses. Exam reveals no friction rub.   No murmur heard.  bradycardic   Pulmonary/Chest: Effort normal and breath sounds normal. No respiratory distress. She has no wheezes. She has no rales.   Abdominal: Soft. Bowel sounds are normal. She exhibits no distension. There is tenderness  (left upper and lower quadrant).   Musculoskeletal: Normal range of motion. She exhibits edema (bilateral lower extremity, non-pitting) and tenderness. She exhibits no deformity.   Neurological: She is alert and oriented to person, place, and time.   Skin: Skin is warm and dry.   Psychiatric: She has a normal mood and affect. Her behavior is normal.   Vitals reviewed.      Significant Labs:   CBC:   Recent Labs   Lab 01/28/20  0248 01/29/20  0319   WBC 4.44 4.19   HGB 9.5* 10.0*   HCT 31.3* 31.1*    176     CMP:   Recent Labs   Lab 01/28/20 0248 01/29/20  0319    140   K 3.7 3.6    103   CO2 32* 32*   GLU 87 89   BUN 15 10   CREATININE 0.8 0.7   CALCIUM 8.6* 8.1*   PROT 5.8* 5.6*   ALBUMIN 3.1* 3.0*   BILITOT 0.3 0.3   ALKPHOS 87 78   AST 12 13   ALT 7* 8*   ANIONGAP 8 5*   EGFRNONAA >60.0 >60.0     Lactic Acid:   Recent Labs   Lab 01/29/20  1215   LACTATE 1.7     All pertinent labs within the past 24 hours have been reviewed.    Significant Imaging: I have reviewed all pertinent imaging results/findings within the past 24 hours.   X-ray abdomen:    FINDINGS:  One view: There is a baclofen pump in a posterior column stimulator.  There is postoperative change of the spine and scoliosis.  There is contrast in the colon.  No obstruction, ileus or perforation seen.      Electronically signed by: Donavan Heredia MD  Date: 01/29/2020  Time: 07:45      Assessment/Plan:      * Partial small bowel obstruction  Left lower quadrant abdominal pain    - resolved with bowel rest    Bradycardia, sinus  Patient with reports of new onset bradycardia and accompanying hypotension. However, per chart review, numerous clinic visits have documented vitals with bradycardia (pulses between 45-55) and BP of 90's/50-60's. Previous echo done in February of 2019 without concerning findings. Patient is hemodynamically stable at time of exam. Electrolytes wnl.     TTE:  · Normal left ventricular systolic function. The  estimated ejection fraction is 60-65%.  · No wall motion abnormalities.  · Normal LV diastolic function.  · Normal right ventricular systolic function.  · The estimated PA systolic pressure is 38 mmHg.  · Intermediate central venous pressure (8 mmHg).  · Severe left atrial enlargement.  · Bradycardic with heart rate in low to mid 40s throughout the study.      Left lower quadrant abdominal pain  Patient with three days of worsening left sided abdominal pain with associated SOB. She reports being able to tolerate her normal PO diet without vomiting and that her fluid intake has been normal. She reports her usual constipation likely due to her chronic opioid use. No reports of melena, just occasional BRBPR due to known hemorrhoids. Although she has extensive back surgery, she denies any abdominal surgery. Initial lab work up shows grossly normal cbc without leukocytosis. CMP shows no major electrolyte derangements and normal LFT and bilirubin. CT scan of abdomen shows dilated distal small bowel loops noting slow flow through these loops. However findings were nonspecific, with differential including developing partial small bowel obstruction on the basis of adhesion versus nonspecific infectious or inflammatory enteritis. Also seen is a large amount of stool.     Differential diagnosis includes inflammatory/infectious enteritis, ileus due to chronic opioid use, severe constipation.     - continue IV ceftriaxone to cover infectious etiology; plan to transition to PO abx 1/30/2020  - SBO resolved  - pain management  - miralax started; will add lactulose if no improvement    Lymphedema of both lower extremities  - Chronic issue that patient has had for years  - Scheduled to see vascular for this on 1/30  - although tender to palpation, physical exam significant for bilateral swelling and normal BNP  - Will hold off on US lower extremity for now, one was done 1 year ago without findings, patient reports no worsening of  symptoms since then.       Primary hypothyroidism  - Continue home levothyroxine when patient can tolerate PO      Neurogenic bladder  Urinary retention    - Patient has chronic meadows, switched every 3 weeks      Coronary artery disease involving native coronary artery of native heart without angina pectoris  Hyperlipidemia    - Home meds are ASA 81 mg and statin  - Continue when patient can tolerate PO      GERD (gastroesophageal reflux disease)  - 40 mg pantoprazole at home, restarted    Drug-induced constipation  miralax  -will add lactulose if no improvement      Chronic pain syndrome  Narcotic dependency    - Patient currently has dilaudid pump with daily dose increased to 3.67 mg dilaudid daily  - Also uses Norco  mg at home for breakthrough pain  - Will keep morphine prn for severe breakthrough pain  - prn narcan for opioid reversal   - biscodyl suppository for opioid induced constipation, may need enema as she takes large amounts of laxatives at home      Generalized anxiety disorder  - Continue home Seroquel when patient can tolerate PO         VTE Risk Mitigation (From admission, onward)         Ordered     IP VTE HIGH RISK PATIENT  Once      01/28/20 0135     Place sequential compression device  Until discontinued      01/28/20 0135     enoxaparin injection 40 mg  Daily      01/27/20 1943                      Gifty Myrick MD  Department of Hospital Medicine   [facility]

## 2020-01-30 NOTE — PT/OT/SLP EVAL
"Occupational Therapy   Evaluation    Name: Tasha Hawley  MRN: [id number]  Admitting Diagnosis:  Partial small bowel obstruction      Recommendations:     Discharge Recommendations: home health OT  Discharge Equipment Recommendations:  none  Barriers to discharge:  None    Assessment:     Tasha Hawley is a 64 y.o. female with a medical diagnosis of Partial small bowel obstruction.  She presents with impaired ADL and fx'l mobility performance. At baseline, pt lives with spouse in [facility] with no BRENDA. Pt mobilizes with rollator and is (I) with ADLs. Pt visited on two attempts from writing therapist in order to determine therapy needs. Pt more amenable to OOB mobility on second attempt as pt with reported 9/10 L abdomen pain. During OT eval, pt demo SBA with bed mobility and ambulation using RW. Pt requiring cues for upright posture. Performance deficits affecting function: weakness, impaired endurance, impaired self care skills, gait instability, impaired functional mobilty, impaired balance, pain, edema, impaired skin.  Pt would benefit from continued OT skilled services 3x/wk to improve daily living skills to optimize QOL. Pt is recommended to discharge to OT at this time.        Rehab Prognosis: Good; patient would benefit from acute skilled OT services to address these deficits and reach maximum level of function.       Plan:     Patient to be seen 3 x/week to address the above listed problems via self-care/home management, therapeutic activities, therapeutic exercises  · Plan of Care Expires: 02/29/20  · Plan of Care Reviewed with: patient    Subjective     Chief Complaint: 9/10 L abdominal pain   Patient/Family Comments/goals: "Can you come back later?"- first attempt at seeing pt.     Occupational Profile:  Living Environment: Pt lives with  in [facility] with no BRENDA.  Previous level of function: (I) with ADLs and mobilizes with mod (I) using Rollator   Roles and Routines: None stated at time of eval; pt " is a spouse  Equipment Used at Home:  walker, rolling, power chair, bath bench, medication pump, bedside commode  Assistance upon Discharge:  home 24/7    Pain/Comfort:  · Pain Rating 1: 9/10  · Location - Side 1: Left  · Location - Orientation 1: generalized  · Location 1: abdomen  · Pain Addressed 1: Reposition, Distraction, Nurse notified  · Pain Rating 2: 9/10  · Location 2: abdomen  · Pain Addressed 2: Reposition, Distraction    Patients cultural, spiritual, Rastafarian conflicts given the current situation: no    Objective:     Communicated with: RN prior to session.  Patient found HOB elevated with peripheral IV, meadows catheter, telemetry upon OT entry to room.    General Precautions: Standard, fall   Orthopedic Precautions:N/A   Braces: N/A     Occupational Performance:    Bed Mobility:    · Patient completed Rolling/Turning to Left with  stand by assistance  · Patient completed Scooting/Bridging with stand by assistance  · Patient completed Supine to Sit with stand by assistance  · Patient completed Sit to Supine with stand by assistance    Functional Mobility/Transfers:  · Patient completed Sit <> Stand Transfer with stand by assistance  with  rolling walker   · Functional Mobility: Pt mobilized with RW in bedroom and hallway with several standing rest breaks. Pt requiring cues for upright posture and for appropriate, safe hand placement on RW.    Activities of Daily Living:  · Lower Body Dressing: stand by assistance raymon/babs HIDALGO at EOB     Cognitive/Visual Perceptual:  Cognitive/Psychosocial Skills:     -       Oriented to: Person, Place, Time and Situation   -       Follows Commands/attention:Follows multistep  commands  -       Communication: clear/fluent  -       Memory: No Deficits noted  -       Safety awareness/insight to disability: intact   -       Mood/Affect/Coping skills/emotional control: Pleasant    Physical Exam:  Balance:    -       Demo good sitting and standing balance however  requiring vc's for upright posture during mobility  Upper Extremity Range of Motion:     -       Right Upper Extremity: WFL  -       Left Upper Extremity: WFL  Upper Extremity Strength:    -       Right Upper Extremity: WFL  -       Left Upper Extremity: WFL   Strength:    -       Right Upper Extremity: WNL  -       Left Upper Extremity: WNL    AMPAC 6 Click ADL:  AMPAC Total Score: 20    Treatment & Education:  Pt educated on role of occupational therapy, POC, and safety during ADLs and functional mobility. Pt and OT discussed importance of safe, continued mobility to optimize daily living skills. Pt verbalized understanding. White board updated during session. Pt given instruction to call for medical staff/nurse for assistance.     Education:    Patient left HOB elevated with all lines intact, call button in reach and MD present    GOALS:   Multidisciplinary Problems     Occupational Therapy Goals        Problem: Occupational Therapy Goal    Goal Priority Disciplines Outcome Interventions   Occupational Therapy Goal     OT, PT/OT Ongoing, Progressing    Description:  Goals to be met by: 2/10/2020     Patient will increase functional independence with ADLs by performing:    UE Dressing with Set-up Assistance.  LE Dressing with Stand-by Assistance.  Grooming while standing with Stand-by Assistance.  Toileting from toilet with Stand-by Assistance for hygiene and clothing management.   Rolling to Bilateral with Modified Turner.   Supine to sit with Modified Turner.  Step transfer with Supervision  Toilet transfer to toilet with Supervision.                      History:     Past Medical History:   Diagnosis Date    Anticoagulant long-term use     Anxiety     Arthritis     Bilateral lower extremity edema     severe chronic    Carotid artery occlusion     Cataract     Coronary artery disease     subtotalled LAD with collateral    Depression     Fever blister     Hypothyroid     Iron deficiency  anemia     Lumbar radiculopathy     with chronic pain    Ocular migraine     Sleep apnea     cpap       Past Surgical History:   Procedure Laterality Date    ADENOIDECTOMY      BACK SURGERY      x 12    CARDIAC CATHETERIZATION  2016    subtotalled LAD with right to left collaterals    CATARACT EXTRACTION W/  INTRAOCULAR LENS IMPLANT Left     Dr Coleman     CYSTOSCOPIC LITHOLAPAXY N/A 6/27/2019    Procedure: CYSTOLITHOLAPAXY;  Surgeon: Shireen Mayo MD;  Location: [facility] OR 2ND FLR;  Service: Urology;  Laterality: N/A;    CYSTOSCOPIC LITHOLAPAXY N/A 9/3/2019    Procedure: CYSTOLITHOLAPAXY;  Surgeon: Shireen Mayo MD;  Location: [facility] OR 2ND FLR;  Service: Urology;  Laterality: N/A;    ESOPHAGOGASTRODUODENOSCOPY N/A 5/23/2018    Procedure: ESOPHAGOGASTRODUODENOSCOPY (EGD);  Surgeon: Prince Vance MD;  Location: [facility] (4TH FLR);  Service: Endoscopy;  Laterality: N/A;  r/s 'd per Dr. Vance due to family emergency- ER    HYSTERECTOMY  1975    endometriosis    pain pump placement      SQ Dilaudid Pump managed by Dr. Hillman, Pain Management    REPLACEMENT OF CATHETER N/A 10/31/2019    Procedure: REPLACEMENT, CATHETER-SUPRAPUBIC;  Surgeon: Shireen Mayo MD;  Location: [facility] OR 1ST FLR;  Service: Urology;  Laterality: N/A;    SPINAL CORD STIMULATOR REMOVAL      before [place]    SPINE SURGERY  5-13-13    CERVICAL FUSION    TONSILLECTOMY         Time Tracking:     OT Date of Treatment: 01/30/20  OT Start Time: 0829  OT Stop Time: 0837(additional time spent 1010-1030am further eval)  OT Total Time (min): 8 min    Billable Minutes:Evaluation 20 min  Self Care/Home Management 8 min    Haven Sesay, OT  1/30/2020

## 2020-01-30 NOTE — ASSESSMENT & PLAN NOTE
"Patient with three days of worsening left sided abdominal pain with associated SOB. She reports being able to tolerate her normal PO diet without vomiting and that her fluid intake has been normal. She reports her usual constipation likely due to her chronic opioid use. No reports of melena, just occasional BRBPR due to known hemorrhoids. Although she has extensive back surgery, she denies any abdominal surgery. Initial lab work up shows grossly normal cbc without leukocytosis. CMP shows no major electrolyte derangements and normal LFT and bilirubin. CT scan of abdomen shows dilated distal small bowel loops noting slow flow through these loops. However findings were nonspecific, with differential including developing partial small bowel obstruction on the basis of adhesion versus nonspecific infectious or inflammatory enteritis. Also seen is a large amount of stool.     Differential diagnosis includes inflammatory/infectious enteritis, ileus due to chronic opioid use, severe constipation.     - continue IV ceftriaxone to cover infectious etiology with PO metronidazole to cover infectious colitis   - Suspect abdominal pain related to opioid induced constipation with inflammatory changes in bowel; Will wean off Morphine IV and patient will need to follow with outpatient pain specialist   - Added Lactulose 30mg TID with Miralax and Bisacodyl with plan to advance to enema if needed   - US for Mesenteric Ischemia with "No evidence hemodynamically significant stenosis in the celiac artery.  The SMA and EDMOND were not visualized due to overlying bowel gas."   - Advanced Diet from liquid to mechanical soft   - Continuing to follow CBC and CMP daily   "

## 2020-01-30 NOTE — ASSESSMENT & PLAN NOTE
Urinary retention    - Patient has chronic meadows, switched every 3 weeks  - Sight continues to look nonerythematous

## 2020-01-30 NOTE — ASSESSMENT & PLAN NOTE
Patient with reports of new onset bradycardia and accompanying hypotension. However, per chart review, numerous clinic visits have documented vitals with bradycardia (pulses between 45-55) and BP of 90's/50-60's. Previous echo done in February of 2019 without concerning findings. Patient is hemodynamically stable at time of exam. Electrolytes wnl.     TTE:  · Normal left ventricular systolic function. The estimated ejection fraction is 60-65%.  · No wall motion abnormalities.  · Normal LV diastolic function.  · Normal right ventricular systolic function.  · The estimated PA systolic pressure is 38 mmHg.  · Intermediate central venous pressure (8 mmHg).  · Severe left atrial enlargement.  · Bradycardic with heart rate in low to mid 40s throughout the study.

## 2020-01-30 NOTE — TELEPHONE ENCOUNTER
Spoke with pt and notified her that Dr aHney will not be able to see her while she is inpatient. We will follow up with her when she is dc'd.

## 2020-01-30 NOTE — PT/OT/SLP PROGRESS
Physical Therapy Treatment    Patient Name:  Tasha Hawley   MRN:  [id number]    Recommendations:     Discharge Recommendations:  home health PT   Discharge Equipment Recommendations: none   Barriers to discharge: None    Assessment:     Tasha Hawley is a 64 y.o. female admitted with a medical diagnosis of Partial small bowel obstruction.  She presents with the following impairments/functional limitations:  impaired endurance, gait instability, decreased lower extremity function, decreased safety awareness, impaired self care skills, impaired balance, pain, edema, impaired functional mobilty . Pt  with functional mobility deficits and should benefit from  PT  to maximize I and safety decrease risk of further decline of injury.    Rehab Prognosis: Good; patient would benefit from acute skilled PT services to address these deficits and reach maximum level of function.    Recent Surgery: * No surgery found *      Plan:     During this hospitalization, patient to be seen 3 x/week to address the identified rehab impairments via gait training, therapeutic activities, therapeutic exercises, neuromuscular re-education and progress toward the following goals:    · Plan of Care Expires:  02/28/20    Subjective     Chief Complaint: LBP and L flank pain  Patient/Family Comments/goals: none stated  Pain/Comfort:  · Pain Rating 1: 9/10  · Location - Side 1: Left  · Location - Orientation 1: generalized  · Location 1: abdomen  · Pain Addressed 1: Reposition, Distraction, Cessation of Activity, Pre-medicate for activity  · Pain Rating Post-Intervention 1: 9/10  · Pain Rating 2: 9/10  · Location - Orientation 2: lower  · Location 2: back  · Pain Addressed 2: Pre-medicate for activity, Reposition, Distraction, Cessation of Activity  · Pain Rating Post-Intervention 2: 9/10      Objective:     Communicated with nursing prior to session.  Patient found supine with peripheral IV, meadows catheter, telemetry upon PT entry to room.      General Precautions: Standard, fall   Orthopedic Precautions:N/A   Braces: N/A     Functional Mobility:  · Bed Mobility:     · Rolling Left:  modified independence  · Supine to Sit: modified independence  · Sit to Supine: modified independence  · Transfers:     · Sit to Stand:  modified independence with no AD  · Gait: x 200ft with RW and SBA>CGA for turns.  Pt with flexed and rotated trunk/posture in gait; pt reports due to pain.  Pt often rests  L elbow on RW during gait to alleviate pain. Pt educated on how this decreases safety, pt with fair return demonstration of proper hand placement.        AM-PAC 6 CLICK MOBILITY  Turning over in bed (including adjusting bedclothes, sheets and blankets)?: 4  Sitting down on and standing up from a chair with arms (e.g., wheelchair, bedside commode, etc.): 4  Moving from lying on back to sitting on the side of the bed?: 4  Moving to and from a bed to a chair (including a wheelchair)?: 3  Need to walk in hospital room?: 3  Climbing 3-5 steps with a railing?: 3  Basic Mobility Total Score: 21       Therapeutic Activities and Exercises:Pt presented supine in bed, agreeable to PT.  Pt rolled L up to sit to stand with mod I, no AD.  Gait training x 200ft with RW and SBA>CGA for turns.  Pt with flexed and rotated trunk/posture in gait; pt reports due to pain.  Pt often rests  L elbow on RW during gait to alleviate pain. Pt educated on how this decreases safety, pt with fair return demonstration of proper hand placement.  Pt returned to room to transfer to bed pt released RW prior to safe positioning to sit.  Pt educated on proper transfer technique with return verbal acknowledgement/understanding. Pt transferred to supine and completed positioning with mod I.    Patient left supine with all lines intact and call button in reach..    GOALS:   Multidisciplinary Problems     Physical Therapy Goals        Problem: Physical Therapy Goal    Goal Priority Disciplines Outcome Goal  Variances Interventions   Physical Therapy Goal     PT, PT/OT Ongoing, Progressing     Description:  Goals to be met by: 2020     Patient will increase functional independence with mobility by performin. Supine to sit with Stand-by Assistance 20 met with mod I  2. Sit to supine with Stand-by Assistance 2020 met with mod I  3. Rolling to Left/Right with Supervision. Roll L met with mod I  4. Sit to stand transfer with Contact Guard Assistance and rolling walker. 2020 met with mod I to no AD  5. Bed to chair transfer with Minimal Assistance using Rolling Walker met 2020 with mod I  6. Gait  x 200ft with SPV upgraded 2020                       Time Tracking:     PT Received On: 20  PT Start Time: 1011     PT Stop Time: 1027  PT Total Time (min): 16 min     Billable Minutes: Gait Training 16    Treatment Type: Treatment  PT/PTA: PT     PTA Visit Number: 0     Mitch Sesay, PT  2020

## 2020-01-30 NOTE — PROGRESS NOTES
[facility] Hospital Medicine  Progress Note    Patient Name: Tasha Hawley  MRN: [id number]  Patient Class: IP- Inpatient   Admission Date: 1/27/2020  Length of Stay: 3 days  Attending Physician: Jose Baker MD  Primary Care Provider: Mesfin Hodges Ii, MD    [facility] Medicine Team: [facility] 1 Nic Peres MD    Subjective:     Principal Problem:Partial small bowel obstruction        HPI:  Patient is a 64 year old female with CAD, hypothyroid, sleep apnea, arthritis, neurogenic bladder with meadows at home (changed every 3 weeks), and chronic back pain since a work accident in 1997 (has had 12 back surgeries) on dilaudid pump who presents to hospital with complaints of back pain; also complains of shortness of breath. She reports that her pain started 3 days ago without particular inciting incident. She sat down as she felt a sharp pain followed by shortness of breath. At the time, she took her pulse and blood pressure; she does this at home to make sure she is tolerating her dilaudid pump. She found that she was hypotensive at 90's/60's and was bradycardic with pulse between 40-50. Initially she waited to see if the pain would resolve but it continued to get worse. She finally came to the hospital when her home health nurse took her vitals and noted the bradycardia. Of note, patient was treated for UTI that occurred 2 weeks ago; she finished a 7 days course of antibiotics (initially treated with amoxicillin but switched to omnicef due to previous resistant proteus). In addition, she was to follow up with cardiology on the 30th with echo as well as meet with vascular surgery for her chronic leg swelling. Her previous echo in February of 2019 had normal systolic and diastolic function. She also has recently had a 10% increase to her given dilaudid dose; now gets 3.67 mg daily.     In ED, her cbc showed a mild anemia of 10.8 (chronic problem) and no leukocytosis. CMP was grossly  normal. Cardiac work up was negative with a negative BNP, mild increase in troponin to .033, and ECG showing sinus bradycardia. Her urine was negative for UTI, lipase and lactic acid wnl. Imaging was significant for a CT abdomen which showed nonspecific findings , with differential including developing partial small bowel obstruction on the basis of adhesion versus nonspecific infectious or inflammatory enteritis. At this point patient had already been seen by general surgery who recommend non-operative management for now, and gastrografin scan; they recommend against NG tube as well.     At the time of my exam, patient endorses some mild light headedness, lower chest pain, left sided abdominal pain, diffuse back pain, leg swelling, constipation,  hematuria (chronic problem), occasional FRBPR due to hemorrhoids and chills. She denies dizziness, headaches, shortness of breath, cough, diarrhea, vomiting, or fevers. Her only sick contact was her  with some resolved URI symptoms. She reports that her last full meal was yesterday which she tolerated without issue and that she has proper fluid intake.    Overview/Hospital Course:  Admitted on 1/27/2020 for acute abdominal pain; initial CT abdomen concerning for partial bowel obstruction. General surgery was consulted; contrast challenge demonstrated rapid transit of contrast to the colon, not concerning for obstruction.     Suspect abdominal pain related to opioid-induced constipation; miralax started.   01/30/2020: Lactulose 30 TID for constipation on top of Miralax and bisacodyl; Resuming all home oral medications; Advanced diet to mechanical soft     Interval History: Patient not in distress this AM. Complains of Left sided abdominal pain that radiates around side to her back. Explained to patient that pain opioid medication is not benefiting her constipation and that we will begin to wean off of IV opioid in hospital. Small BM ON but not passing flatus. Patient  wants to eat solid food. Some nausea without emesis overnight     Review of Systems   Constitutional: Negative for fever and unexpected weight change.   HENT: Positive for hearing loss (mild bilateral hearing loss (chronic, worse on the right)). Negative for congestion and sore throat.    Respiratory: Positive for shortness of breath (occasional). Negative for cough and chest tightness.    Cardiovascular: Negative for palpitations.   Gastrointestinal: Positive for abdominal distention, abdominal pain (left sided) and constipation. Negative for blood in stool, diarrhea, nausea and vomiting.   Genitourinary: Positive for flank pain (left sided).   Musculoskeletal: Positive for back pain. Negative for arthralgias and myalgias.   Skin: Negative for color change, pallor and rash.   Neurological: Positive for weakness. Negative for dizziness and headaches.     Objective:     Vital Signs (Most Recent):  Temp: 98.2 °F (36.8 °C) (01/30/20 1233)  Pulse: (!) 54 (01/30/20 1233)  Resp: 16 (01/30/20 1233)  BP: (!) 115/58 (01/30/20 1233)  SpO2: 98 % (01/30/20 1233) Vital Signs (24h Range):  Temp:  [97.5 °F (36.4 °C)-98.2 °F (36.8 °C)] 98.2 °F (36.8 °C)  Pulse:  [46-77] 54  Resp:  [15-16] 16  SpO2:  [94 %-98 %] 98 %  BP: ()/(47-67) 115/58     Weight: 87 kg (191 lb 12.8 oz)  Body mass index is 31.92 kg/m².    Intake/Output Summary (Last 24 hours) at 1/30/2020 1410  Last data filed at 1/29/2020 2015  Gross per 24 hour   Intake --   Output 2025 ml   Net -2025 ml      Physical Exam   Constitutional: She is oriented to person, place, and time. She appears well-developed and well-nourished. No distress.   HENT:   Head: Normocephalic and atraumatic.   Eyes: Pupils are equal, round, and reactive to light. Conjunctivae and EOM are normal. No scleral icterus.   Neck: Normal range of motion. Neck supple. No JVD present.   Cardiovascular: Regular rhythm, normal heart sounds and intact distal pulses. Exam reveals no friction rub.   No  murmur heard.  bradycardic   Pulmonary/Chest: Effort normal and breath sounds normal. No respiratory distress. She has no wheezes. She has no rales.   Abdominal: Soft. Bowel sounds are normal. She exhibits no distension. There is tenderness (left upper and lower quadrant).   Musculoskeletal: Normal range of motion. She exhibits edema (bilateral lower extremity, non-pitting) and tenderness. She exhibits no deformity.   Neurological: She is alert and oriented to person, place, and time.   Skin: Skin is warm and dry.   Psychiatric: She has a normal mood and affect. Her behavior is normal.   Vitals reviewed.      Significant Labs:   CBC:   Recent Labs   Lab 01/29/20  0319 01/30/20  0353   WBC 4.19 4.28   HGB 10.0* 10.1*   HCT 31.1* 32.8*    179       Significant Imaging: I have reviewed all pertinent imaging results/findings within the past 24 hours.      Assessment/Plan:      Left lower quadrant abdominal pain  Patient with three days of worsening left sided abdominal pain with associated SOB. She reports being able to tolerate her normal PO diet without vomiting and that her fluid intake has been normal. She reports her usual constipation likely due to her chronic opioid use. No reports of melena, just occasional BRBPR due to known hemorrhoids. Although she has extensive back surgery, she denies any abdominal surgery. Initial lab work up shows grossly normal cbc without leukocytosis. CMP shows no major electrolyte derangements and normal LFT and bilirubin. CT scan of abdomen shows dilated distal small bowel loops noting slow flow through these loops. However findings were nonspecific, with differential including developing partial small bowel obstruction on the basis of adhesion versus nonspecific infectious or inflammatory enteritis. Also seen is a large amount of stool.     Differential diagnosis includes inflammatory/infectious enteritis, ileus due to chronic opioid use, severe constipation.     - continue  "IV ceftriaxone to cover infectious etiology with PO metronidazole to cover infectious colitis   - Suspect abdominal pain related to opioid induced constipation with inflammatory changes in bowel; Will wean off Morphine IV and patient will need to follow with outpatient pain specialist   - Added Lactulose 30mg TID with Miralax and Bisacodyl with plan to advance to enema if needed   - US for Mesenteric Ischemia with "No evidence hemodynamically significant stenosis in the celiac artery.  The SMA and EDMOND were not visualized due to overlying bowel gas."   - Advanced Diet from liquid to mechanical soft   - Continuing to follow CBC and CMP daily     Bradycardia, sinus  Patient with reports of new onset bradycardia and accompanying hypotension. However, per chart review, numerous clinic visits have documented vitals with bradycardia (pulses between 45-55) and BP of 90's/50-60's. Previous echo done in February of 2019 without concerning findings. Patient is hemodynamically stable at time of exam. Electrolytes wnl.     TTE:  · Normal left ventricular systolic function. The estimated ejection fraction is 60-65%.  · No wall motion abnormalities.  · Normal LV diastolic function.  · Normal right ventricular systolic function.  · The estimated PA systolic pressure is 38 mmHg.  · Intermediate central venous pressure (8 mmHg).  · Severe left atrial enlargement.  · Bradycardic with heart rate in low to mid 40s throughout the study.      Partial small bowel obstruction  Left lower quadrant abdominal pain    - resolved with bowel rest    Pure hypercholesterolemia        Lymphedema of both lower extremities  - Chronic issue that patient has had for years  - Scheduled to see vascular for this on 1/30  - although tender to palpation, physical exam significant for bilateral swelling and normal BNP  - US LE ordered on 1/30: No evidence of DVT in B/L LE      Primary hypothyroidism  - Continue home levothyroxine  Thyroid Labs Latest Ref Rng " & Units 1/28/2020 1/29/2020 1/30/2020   TSH 0.400 - 4.000 uIU/mL 6.572(H) - -   Free T4 0.71 - 1.51 ng/dL 0.97 - -   Sodium 136 - 145 mmol/L 140 140 143   Potassium 3.5 - 5.1 mmol/L 3.7 3.6 3.9   Chloride 95 - 110 mmol/L 100 103 106   Carbon Dioxide 23 - 29 mmol/L 32(H) 32(H) 28   Glucose 70 - 110 mg/dL 87 89 84   Blood Urea Nitrogen 8 - 23 mg/dL 15 10 4(L)   Creatinine 0.5 - 1.4 mg/dL 0.8 0.7 0.8   Calcium 8.7 - 10.5 mg/dL 8.6(L) 8.1(L) 8.5(L)   Total Protein 6.0 - 8.4 g/dL 5.8(L) 5.6(L) 5.8(L)   Albumin 3.5 - 5.2 g/dL 3.1(L) 3.0(L) 3.1(L)   Total Bilirubin 0.1 - 1.0 mg/dL 0.3 0.3 0.2   AST 10 - 40 U/L 12 13 13   ALT 10 - 44 U/L 7(L) 8(L) 6(L)   Anion Gap 8 - 16 mmol/L 8 5(L) 9   eGFR (African American) >60 mL/min/1.73 m:2 >60.0 >60.0 >60.0   eGFR (Non-African American) >60 mL/min/1.73 m:2 >60.0 >60.0 >60.0   WBC 3.90 - 12.70 K/uL 4.44 4.19 4.28   RBC 4.00 - 5.40 M/uL 3.50(L) 3.55(L) 3.65(L)   Hemoglobin 12.0 - 16.0 g/dL 9.5(L) 10.0(L) 10.1(L)   Hematocrit 37.0 - 48.5 % 31.3(L) 31.1(L) 32.8(L)   MCV 82 - 98 fL 89 88 90   MCH 27.0 - 31.0 pg 27.1 28.2 27.7   MCHC 32.0 - 36.0 g/dL 30.4(L) 32.2 30.8(L)   RDW 11.5 - 14.5 % 13.5 13.3 13.5   Platelets 150 - 350 K/uL 170 176 179   MPV 9.2 - 12.9 fL 10.9 10.6 10.7   Gran # 1.8 - 7.7 K/uL 1.9 2.2 2.0   Lymph # 1.0 - 4.8 K/uL 1.8 1.4 1.5   Mono # 0.3 - 1.0 K/uL 0.5 0.3 0.5   Eos # 0.0 - 0.5 K/uL 0.2 0.2 0.3   Baso # 0.00 - 0.20 K/uL 0.02 0.02 0.02   Gran % 38.0 - 73.0 % 42.1 53.0 46.2   Lymph % 18.0 - 48.0 % 40.8 32.7 33.9   Mono% 4.0 - 15.0 % 11.0 8.1 11.2   Eos % 0.0 - 8.0 % 5.4 5.5 7.5   Baso % 0.0 - 1.9 % 0.5 0.5 0.5   Prothrombin Time 9.0 - 12.5 sec - - -   INR 0.8 - 1.2 - - -   aPTT 21.0 - 32.0 sec - - -   Calcitonin <=7.6 pg/mL - - -           Urinary retention        Neurogenic bladder  Urinary retention    - Patient has chronic meadows, switched every 3 weeks  - Sight continues to look nonerythematous       Coronary artery disease involving native coronary artery of  native heart without angina pectoris  Hyperlipidemia    - Continue ASA 81 mg and statin      GERD (gastroesophageal reflux disease)  - 40 mg pantoprazole at home, restarted    Drug-induced constipation  miralax  -Added Lactulose 30mg TID   -Will advance to enema if needed       Narcotic dependency, continuous        Chronic pain syndrome  Narcotic dependency    - Patient currently has dilaudid pump with daily dose increased to 3.67 mg dilaudid daily  - Also uses Norco  mg at home for breakthrough pain  - Will wean morphine IV 6mg from Q3 to Q6  - prn narcan for opioid reversal   - biscodyl suppository, Miralax, and Lactulose 30mg TID      Generalized anxiety disorder  - Did not continue home Seroquel in the presence of prolonged QT. Began PRN Lorazepam 0.5 Q6       VTE Risk Mitigation (From admission, onward)         Ordered     IP VTE HIGH RISK PATIENT  Once      01/28/20 0135     Place sequential compression device  Until discontinued      01/28/20 0135     enoxaparin injection 40 mg  Daily      01/27/20 1943                      Nic Peres MD  Department of Hospital Medicine   [facility]

## 2020-01-30 NOTE — PLAN OF CARE
Pt alert throughout evening.  Pt c/o 10/10 pain, moderately controlled by PRN morphine.    Pt had 1 small BM.    Pt SB on telemetry in 40s and 50s.  Pt on RA.    Pt NPO@0000 for abdominal US in AM.     Pt ambulating to bathroom with 1 assist.

## 2020-01-30 NOTE — ASSESSMENT & PLAN NOTE
Narcotic dependency    - Patient currently has dilaudid pump with daily dose increased to 3.67 mg dilaudid daily  - Also uses Norco  mg at home for breakthrough pain  - Will wean morphine IV 6mg from Q3 to Q6  - prn narcan for opioid reversal   - biscodyl suppository, Miralax, and Lactulose 30mg TID

## 2020-01-30 NOTE — ASSESSMENT & PLAN NOTE
Patient with three days of worsening left sided abdominal pain with associated SOB. She reports being able to tolerate her normal PO diet without vomiting and that her fluid intake has been normal. She reports her usual constipation likely due to her chronic opioid use. No reports of melena, just occasional BRBPR due to known hemorrhoids. Although she has extensive back surgery, she denies any abdominal surgery. Initial lab work up shows grossly normal cbc without leukocytosis. CMP shows no major electrolyte derangements and normal LFT and bilirubin. CT scan of abdomen shows dilated distal small bowel loops noting slow flow through these loops. However findings were nonspecific, with differential including developing partial small bowel obstruction on the basis of adhesion versus nonspecific infectious or inflammatory enteritis. Also seen is a large amount of stool.     Differential diagnosis includes inflammatory/infectious enteritis, ileus due to chronic opioid use, severe constipation.     - continue IV ceftriaxone to cover infectious etiology; plan to transition to PO abx 1/30/2020  - SBO resolved  - pain management  - miralax started; will add lactulose if no improvement

## 2020-01-30 NOTE — PLAN OF CARE
Problem: Physical Therapy Goal  Goal: Physical Therapy Goal  Description  Goals to be met by: 2020     Patient will increase functional independence with mobility by performin. Supine to sit with Stand-by Assistance 20 met with mod I  2. Sit to supine with Stand-by Assistance 2020 met with mod I  3. Rolling to Left/Right with Supervision. Roll L met with mod I  4. Sit to stand transfer with Contact Guard Assistance and rolling walker. 2020 met with mod I to no AD  5. Bed to chair transfer with Minimal Assistance using Rolling Walker met 2020 with mod I  6. Gait  x 200ft with SPV upgraded 2020      Outcome: Ongoing, Progressing  Pt presented supine in bed, agreeable to PT.  Pt rolled L up to sit to stand with mod I, no AD.  Gait training x 200ft with RW and SBA>CGA for turns.  Pt with flexed and rotated trunk/posture in gait; pt reports due to pain.  Pt often rests  L elbow on RW during gait to alleviate pain. Pt educated on how this decreases safety, pt with fair return demonstration of proper hand placement.  Pt returned to room to transfer to bed pt released RW prior to safe positioning to sit.  Pt educated on proper transfer technique with return verbal acknowledgement/understanding. Pt transferred to supine and completed positioning with mod I.

## 2020-01-30 NOTE — ASSESSMENT & PLAN NOTE
- Chronic issue that patient has had for years  - Scheduled to see vascular for this on 1/30  - although tender to palpation, physical exam significant for bilateral swelling and normal BNP  - US LE ordered on 1/30: No evidence of DVT in B/L LE

## 2020-01-31 VITALS
OXYGEN SATURATION: 95 % | WEIGHT: 187.38 LBS | HEIGHT: 65 IN | BODY MASS INDEX: 31.22 KG/M2 | DIASTOLIC BLOOD PRESSURE: 50 MMHG | RESPIRATION RATE: 18 BRPM | SYSTOLIC BLOOD PRESSURE: 96 MMHG | TEMPERATURE: 98 F | HEART RATE: 47 BPM

## 2020-01-31 LAB
ALBUMIN SERPL BCP-MCNC: 3.2 G/DL (ref 3.5–5.2)
ALP SERPL-CCNC: 89 U/L (ref 55–135)
ALT SERPL W/O P-5'-P-CCNC: 9 U/L (ref 10–44)
ANION GAP SERPL CALC-SCNC: 5 MMOL/L (ref 8–16)
AST SERPL-CCNC: 18 U/L (ref 10–40)
BACTERIA #/AREA URNS AUTO: ABNORMAL /HPF
BASOPHILS # BLD AUTO: 0.02 K/UL (ref 0–0.2)
BASOPHILS NFR BLD: 0.4 % (ref 0–1.9)
BILIRUB SERPL-MCNC: 0.3 MG/DL (ref 0.1–1)
BILIRUB UR QL STRIP: NEGATIVE
BUN SERPL-MCNC: 4 MG/DL (ref 8–23)
CALCIUM SERPL-MCNC: 8.6 MG/DL (ref 8.7–10.5)
CHLORIDE SERPL-SCNC: 105 MMOL/L (ref 95–110)
CLARITY UR REFRACT.AUTO: CLEAR
CO2 SERPL-SCNC: 29 MMOL/L (ref 23–29)
COLOR UR AUTO: YELLOW
CREAT SERPL-MCNC: 0.8 MG/DL (ref 0.5–1.4)
DIFFERENTIAL METHOD: ABNORMAL
EOSINOPHIL # BLD AUTO: 0.2 K/UL (ref 0–0.5)
EOSINOPHIL NFR BLD: 4.6 % (ref 0–8)
ERYTHROCYTE [DISTWIDTH] IN BLOOD BY AUTOMATED COUNT: 13.6 % (ref 11.5–14.5)
EST. GFR  (AFRICAN AMERICAN): >60 ML/MIN/1.73 M^2
EST. GFR  (NON AFRICAN AMERICAN): >60 ML/MIN/1.73 M^2
GLUCOSE SERPL-MCNC: 118 MG/DL (ref 70–110)
GLUCOSE UR QL STRIP: NEGATIVE
HCT VFR BLD AUTO: 31.7 % (ref 37–48.5)
HGB BLD-MCNC: 9.5 G/DL (ref 12–16)
HGB UR QL STRIP: ABNORMAL
IMM GRANULOCYTES # BLD AUTO: 0.01 K/UL (ref 0–0.04)
IMM GRANULOCYTES NFR BLD AUTO: 0.2 % (ref 0–0.5)
KETONES UR QL STRIP: NEGATIVE
LEUKOCYTE ESTERASE UR QL STRIP: ABNORMAL
LYMPHOCYTES # BLD AUTO: 1.4 K/UL (ref 1–4.8)
LYMPHOCYTES NFR BLD: 28.8 % (ref 18–48)
MAGNESIUM SERPL-MCNC: 1.9 MG/DL (ref 1.6–2.6)
MCH RBC QN AUTO: 27.2 PG (ref 27–31)
MCHC RBC AUTO-ENTMCNC: 30 G/DL (ref 32–36)
MCV RBC AUTO: 91 FL (ref 82–98)
MICROSCOPIC COMMENT: ABNORMAL
MONOCYTES # BLD AUTO: 0.5 K/UL (ref 0.3–1)
MONOCYTES NFR BLD: 10.7 % (ref 4–15)
NEUTROPHILS # BLD AUTO: 2.7 K/UL (ref 1.8–7.7)
NEUTROPHILS NFR BLD: 55.3 % (ref 38–73)
NITRITE UR QL STRIP: NEGATIVE
NRBC BLD-RTO: 0 /100 WBC
PH UR STRIP: 8 [PH] (ref 5–8)
PHOSPHATE SERPL-MCNC: 2.9 MG/DL (ref 2.7–4.5)
PLATELET # BLD AUTO: 165 K/UL (ref 150–350)
PMV BLD AUTO: 10.8 FL (ref 9.2–12.9)
POTASSIUM SERPL-SCNC: 3.9 MMOL/L (ref 3.5–5.1)
PROT SERPL-MCNC: 5.9 G/DL (ref 6–8.4)
PROT UR QL STRIP: NEGATIVE
RBC # BLD AUTO: 3.49 M/UL (ref 4–5.4)
RBC #/AREA URNS AUTO: 5 /HPF (ref 0–4)
SODIUM SERPL-SCNC: 139 MMOL/L (ref 136–145)
SP GR UR STRIP: 1.01 (ref 1–1.03)
URN SPEC COLLECT METH UR: ABNORMAL
WBC # BLD AUTO: 4.96 K/UL (ref 3.9–12.7)
WBC #/AREA URNS AUTO: 4 /HPF (ref 0–5)

## 2020-01-31 PROCEDURE — 81001 URINALYSIS AUTO W/SCOPE: CPT | Mod: HCNC

## 2020-01-31 PROCEDURE — 93010 EKG 12-LEAD: ICD-10-PCS | Mod: HCNC,,, | Performed by: INTERNAL MEDICINE

## 2020-01-31 PROCEDURE — 84100 ASSAY OF PHOSPHORUS: CPT | Mod: HCNC

## 2020-01-31 PROCEDURE — 36415 COLL VENOUS BLD VENIPUNCTURE: CPT | Mod: HCNC

## 2020-01-31 PROCEDURE — 25000003 PHARM REV CODE 250: Mod: HCNC | Performed by: HOSPITALIST

## 2020-01-31 PROCEDURE — 80053 COMPREHEN METABOLIC PANEL: CPT | Mod: HCNC

## 2020-01-31 PROCEDURE — 63600175 PHARM REV CODE 636 W HCPCS: Mod: HCNC | Performed by: STUDENT IN AN ORGANIZED HEALTH CARE EDUCATION/TRAINING PROGRAM

## 2020-01-31 PROCEDURE — 99239 PR HOSPITAL DISCHARGE DAY,>30 MIN: ICD-10-PCS | Mod: HCNC,GC,, | Performed by: HOSPITALIST

## 2020-01-31 PROCEDURE — 93010 ELECTROCARDIOGRAM REPORT: CPT | Mod: HCNC,,, | Performed by: INTERNAL MEDICINE

## 2020-01-31 PROCEDURE — 25000003 PHARM REV CODE 250: Mod: HCNC | Performed by: STUDENT IN AN ORGANIZED HEALTH CARE EDUCATION/TRAINING PROGRAM

## 2020-01-31 PROCEDURE — 85025 COMPLETE CBC W/AUTO DIFF WBC: CPT | Mod: HCNC

## 2020-01-31 PROCEDURE — 83735 ASSAY OF MAGNESIUM: CPT | Mod: HCNC

## 2020-01-31 PROCEDURE — 93005 ELECTROCARDIOGRAM TRACING: CPT | Mod: HCNC

## 2020-01-31 PROCEDURE — 99239 HOSP IP/OBS DSCHRG MGMT >30: CPT | Mod: HCNC,GC,, | Performed by: HOSPITALIST

## 2020-01-31 PROCEDURE — 97530 THERAPEUTIC ACTIVITIES: CPT | Mod: HCNC

## 2020-01-31 PROCEDURE — 97116 GAIT TRAINING THERAPY: CPT | Mod: HCNC

## 2020-01-31 RX ORDER — GABAPENTIN 100 MG/1
100 CAPSULE ORAL 3 TIMES DAILY
Status: DISCONTINUED | OUTPATIENT
Start: 2020-01-31 | End: 2020-01-31 | Stop reason: HOSPADM

## 2020-01-31 RX ORDER — AMOXICILLIN AND CLAVULANATE POTASSIUM 875; 125 MG/1; MG/1
1 TABLET, FILM COATED ORAL EVERY 12 HOURS
Qty: 5 TABLET | Refills: 0 | Status: SHIPPED | OUTPATIENT
Start: 2020-01-31 | End: 2020-02-03

## 2020-01-31 RX ORDER — POTASSIUM CITRATE 10 MEQ/1
10 TABLET, EXTENDED RELEASE ORAL DAILY
Status: ON HOLD
Start: 2020-01-31 | End: 2020-05-17 | Stop reason: HOSPADM

## 2020-01-31 RX ORDER — LACTULOSE 10 G/15ML
30 SOLUTION ORAL 3 TIMES DAILY PRN
Qty: 1350 ML | Refills: 0 | Status: SHIPPED | OUTPATIENT
Start: 2020-01-31 | End: 2020-02-10

## 2020-01-31 RX ORDER — POLYETHYLENE GLYCOL 3350 17 G/17G
17 POWDER, FOR SOLUTION ORAL DAILY
Qty: 30 EACH | Refills: 2 | Status: SHIPPED | OUTPATIENT
Start: 2020-01-31 | End: 2020-08-31

## 2020-01-31 RX ORDER — SENNOSIDES 8.6 MG/1
2 TABLET ORAL 2 TIMES DAILY
COMMUNITY
Start: 2020-01-31

## 2020-01-31 RX ORDER — HYDROCODONE BITARTRATE AND ACETAMINOPHEN 10; 325 MG/1; MG/1
1 TABLET ORAL EVERY 6 HOURS PRN
Status: DISCONTINUED | OUTPATIENT
Start: 2020-01-31 | End: 2020-01-31 | Stop reason: HOSPADM

## 2020-01-31 RX ORDER — GABAPENTIN 100 MG/1
100 CAPSULE ORAL 3 TIMES DAILY
Qty: 90 CAPSULE | Refills: 2 | Status: SHIPPED | OUTPATIENT
Start: 2020-01-31 | End: 2021-06-02 | Stop reason: CLARIF

## 2020-01-31 RX ORDER — FUROSEMIDE 40 MG/1
80 TABLET ORAL EVERY MORNING
Start: 2020-01-31 | End: 2020-03-10 | Stop reason: SDUPTHER

## 2020-01-31 RX ADMIN — PANTOPRAZOLE SODIUM 40 MG: 40 TABLET, DELAYED RELEASE ORAL at 11:01

## 2020-01-31 RX ADMIN — FLUOXETINE 60 MG: 20 CAPSULE ORAL at 11:01

## 2020-01-31 RX ADMIN — ATORVASTATIN CALCIUM 80 MG: 20 TABLET, FILM COATED ORAL at 11:01

## 2020-01-31 RX ADMIN — MORPHINE SULFATE 6 MG: 2 INJECTION, SOLUTION INTRAMUSCULAR; INTRAVENOUS at 12:01

## 2020-01-31 RX ADMIN — HYDROCODONE BITARTRATE AND ACETAMINOPHEN 1 TABLET: 10; 325 TABLET ORAL at 11:01

## 2020-01-31 RX ADMIN — POLYETHYLENE GLYCOL 3350 17 G: 17 POWDER, FOR SOLUTION ORAL at 11:01

## 2020-01-31 RX ADMIN — LEVOTHYROXINE SODIUM 125 MCG: 125 TABLET ORAL at 05:01

## 2020-01-31 RX ADMIN — AMOXICILLIN AND CLAVULANATE POTASSIUM 1 TABLET: 875; 125 TABLET, FILM COATED ORAL at 11:01

## 2020-01-31 RX ADMIN — GABAPENTIN 100 MG: 100 CAPSULE ORAL at 11:01

## 2020-01-31 RX ADMIN — LACTULOSE 30 G: 20 SOLUTION ORAL at 11:01

## 2020-01-31 NOTE — PROGRESS NOTES
Pt is getting discharge. D/c instruction explained and given to pt and pt verbalized understanding. Vs is within pt's baseline. Pt aaox4. Wheelchair is order for pt.

## 2020-01-31 NOTE — PLAN OF CARE
01/31/20 1302   Post-Acute Status   Post-Acute Authorization Placement;Home Health/Hospice   Post-Acute Placement Status Set-up Complete   Home Health/Hospice Status Set-up Complete       Pt has been accepted and is set up with [facility]  services .   CM updated.     Perla Sims LMSW   Licensed Master Social Worker

## 2020-01-31 NOTE — PROGRESS NOTES
Pt free of fall this shift. Pain assessed and pt c/o LLQ pain; prn IVP morphine med administered and pt verbalized moderate relief of pain. Cardiac monitor in place and pt was bradycardic but converted into SR toward end of shift. One large BM made this shift. Vss. Pt aaox4. Afebrile. Will continue to monitor pt.

## 2020-01-31 NOTE — PLAN OF CARE
01/31/20 1201   Post-Acute Status   Post-Acute Authorization Placement;Home Health/Hospice   Post-Acute Placement Status Referrals Sent   Home Health/Hospice Status Referrals Sent       SW sent ref for HH services to [facility]  by request of pt, as pt has had services with [facility] in past. [facility] will F/U on ref.     Perla Sims LMSW   Licensed Master Social Worker

## 2020-01-31 NOTE — PLAN OF CARE
Problem: Physical Therapy Goal  Goal: Physical Therapy Goal  Description  Goals to be met by: 2020     Patient will increase functional independence with mobility by performin. Supine to sit with Stand-by Assistance 20 met with mod I  2. Sit to supine with Stand-by Assistance 2020 met with mod I  3. Rolling to Left/Right with Supervision. Roll L met with mod I  4. Sit to stand transfer with Contact Guard Assistance and rolling walker. 2020 met with mod I to no AD  5. Bed to chair transfer with Minimal Assistance using Rolling Walker met 2020 with mod I  6. Gait  x 200ft with SPV upgraded 2020      Outcome: Ongoing, Progressing   Patient tolerated treatment well. Established POC and goals reviewed and remain appropriate. Plan is to continue to improve patient's functional mobility capabilities.    Elayne Stroud, PT, DPT  2020

## 2020-01-31 NOTE — PLAN OF CARE
Fall precaution maintained this shift call this call bell in reach. Bed in reach low position .instructed pt to call for assistance. Vs stable. Pain meds given as needed.

## 2020-01-31 NOTE — PT/OT/SLP PROGRESS
"Physical Therapy Treatment    Patient Name:  Tasha Hawley   MRN:  [id number]    Recommendations:     Discharge Recommendations:  home health PT   Discharge Equipment Recommendations: none   Barriers to discharge: None    Assessment:     Tasha Hawley is a 64 y.o. female admitted with a medical diagnosis of Constipation due to opioid therapy.  She presents with the following impairments/functional limitations:  weakness, impaired functional mobilty, impaired balance, impaired endurance, impaired self care skills, decreased lower extremity function. Patient tolerated session well. Patient would continue to benefit from skilled PT services while in the hospital. At this time, upon d/c she is an appropriate candidate for HHPT in order to progress towards an improve level of functional mobility independence.     Rehab Prognosis: Good; patient would benefit from acute skilled PT services to address these deficits and reach maximum level of function.    Recent Surgery: * No surgery found *      Plan:     During this hospitalization, patient to be seen 3 x/week to address the identified rehab impairments via gait training, therapeutic activities, therapeutic exercises, neuromuscular re-education and progress toward the following goals:    · Plan of Care Expires:  02/28/20    Subjective     Chief Complaint: LBP   Patient/Family Comments/goals: "I'm going home today."  Pain/Comfort:  · Pain Rating 1: (did not rate)  · Location - Orientation 1: generalized  · Location 1: (low back )  · Pain Addressed 1: Reposition, Distraction  · Pain Rating Post-Intervention 1: 0/10      Objective:     Communicated with RN prior to session.  Patient found in the bathroom  with (suprapubic catheter ) upon PT entry to room.     General Precautions: Standard, fall   Orthopedic Precautions:N/A   Braces: N/A     Functional Mobility:  · Transfers:     · Sit to Stand:  supervision with no AD  · Gait: ~200ft with SBA and RW; increased FFP; L " forearm on RW (patient reports baseline); vc's to increase stride length and improve postural awareness   · Balance: good balance throughout, no LOB       AM-PAC 6 CLICK MOBILITY  Turning over in bed (including adjusting bedclothes, sheets and blankets)?: 4  Sitting down on and standing up from a chair with arms (e.g., wheelchair, bedside commode, etc.): 3  Moving from lying on back to sitting on the side of the bed?: 3  Moving to and from a bed to a chair (including a wheelchair)?: 3  Need to walk in hospital room?: 3  Climbing 3-5 steps with a railing?: 3  Basic Mobility Total Score: 19       Therapeutic Activities and Exercises:  -Patient educated on the continued role and goal of PT  -Questions and concerns answered within the the PT scope of practice.   -White board updated in patient room to reflect level of assistance needed with nursing.     Patient left up in chair with all lines intact, call button in reach and RN notified..    GOALS:   Multidisciplinary Problems     Physical Therapy Goals        Problem: Physical Therapy Goal    Goal Priority Disciplines Outcome Goal Variances Interventions   Physical Therapy Goal     PT, PT/OT Ongoing, Progressing     Description:  Goals to be met by: 2020     Patient will increase functional independence with mobility by performin. Supine to sit with Stand-by Assistance 20 met with mod I  2. Sit to supine with Stand-by Assistance 2020 met with mod I  3. Rolling to Left/Right with Supervision. Roll L met with mod I  4. Sit to stand transfer with Contact Guard Assistance and rolling walker. 2020 met with mod I to no AD  5. Bed to chair transfer with Minimal Assistance using Rolling Walker met 2020 with mod I  6. Gait  x 200ft with SPV upgraded 2020                       Time Tracking:     PT Received On: 20  PT Start Time: 1151     PT Stop Time: 1214  PT Total Time (min): 23 min     Billable Minutes: Gait Training 13  and Therapeutic Activity 10    Treatment Type: Treatment  PT/PTA: PT     PTA Visit Number: 0     Elayne Stroud, PT  01/31/2020

## 2020-01-31 NOTE — DISCHARGE INSTRUCTIONS
Please follow up with your family doctor on scheduled date. Please follow with your pain doctor on February 3rd. Please follow with Gastroenterologist for further evaluation of your pain.

## 2020-01-31 NOTE — DISCHARGE SUMMARY
[facility] Hospital Medicine  Discharge Summary      Patient Name: Tasha Hawley  MRN: [id number]  Admission Date: 1/27/2020  Hospital Length of Stay: 4 days  Discharge Date and Time:  01/31/2020 2:05 PM  Attending Physician: Jose Baker MD   Discharging Provider: Nic Peres MD  Primary Care Provider: Mesfin Hodges Ii, MD  [facility] Medicine Team: [facility]OS[facility] MED 1 Nic Peres MD    HPI:   Patient is a 64 year old female with CAD, hypothyroid, sleep apnea, arthritis, neurogenic bladder with meadows at home (changed every 3 weeks), and chronic back pain since a work accident in 1997 (has had 12 back surgeries) on dilaudid pump who presents to hospital with complaints of back pain; also complains of shortness of breath. She reports that her pain started 3 days ago without particular inciting incident. She sat down as she felt a sharp pain followed by shortness of breath. At the time, she took her pulse and blood pressure; she does this at home to make sure she is tolerating her dilaudid pump. She found that she was hypotensive at 90's/60's and was bradycardic with pulse between 40-50. Initially she waited to see if the pain would resolve but it continued to get worse. She finally came to the hospital when her home health nurse took her vitals and noted the bradycardia. Of note, patient was treated for UTI that occurred 2 weeks ago; she finished a 7 days course of antibiotics (initially treated with amoxicillin but switched to omnicef due to previous resistant proteus). In addition, she was to follow up with cardiology on the 30th with echo as well as meet with vascular surgery for her chronic leg swelling. Her previous echo in February of 2019 had normal systolic and diastolic function. She also has recently had a 10% increase to her given dilaudid dose; now gets 3.67 mg daily.     In ED, her cbc showed a mild anemia of 10.8 (chronic problem) and no leukocytosis. CMP was grossly  normal. Cardiac work up was negative with a negative BNP, mild increase in troponin to .033, and ECG showing sinus bradycardia. Her urine was negative for UTI, lipase and lactic acid wnl. Imaging was significant for a CT abdomen which showed nonspecific findings , with differential including developing partial small bowel obstruction on the basis of adhesion versus nonspecific infectious or inflammatory enteritis. At this point patient had already been seen by general surgery who recommend non-operative management for now, and gastrografin scan; they recommend against NG tube as well.     At the time of my exam, patient endorses some mild light headedness, lower chest pain, left sided abdominal pain, diffuse back pain, leg swelling, constipation,  hematuria (chronic problem), occasional FRBPR due to hemorrhoids and chills. She denies dizziness, headaches, shortness of breath, cough, diarrhea, vomiting, or fevers. Her only sick contact was her  with some resolved URI symptoms. She reports that her last full meal was yesterday which she tolerated without issue and that she has proper fluid intake.    * No surgery found *      Hospital Course:   Admitted on 1/27/2020 for acute abdominal pain; initial CT abdomen concerning for partial bowel obstruction. General surgery was consulted; contrast challenge demonstrated rapid transit of contrast to the colon, not concerning for obstruction. PO home medications were restarted and patient tolerated advancing diet. Suspect abdominal pain related to opioid-induced constipation. Patient treated with Lactulose 30mg TID with one BM along with Miralax and bisacodyl. Patient received Rocephin and Metronidazole which was transitioned to PO Augmentin for a total of 7 days of antibiotic coverage for Diverticulitis. Patient remained HD stable, afebrile, and without leukocytosis. US mesenteric was without ischemic changes and US LE B/L was without DVT. Patient to follow up GI, pain  specialist, and family doctor for pain management and opioid induced constipation. GI for any further outpatient workup for inflammatory enteritis found on CT.      Review of Systems   Constitutional: Negative for chills and fever.   HENT: Positive for hearing loss.    Respiratory: Negative for cough.    Cardiovascular: Positive for chest pain and leg swelling.   Gastrointestinal: Positive for constipation.   Genitourinary: Positive for flank pain. Negative for hematuria.   Musculoskeletal: Positive for back pain.   Neurological: Positive for weakness.     Physical Exam   Constitutional: She is oriented to person, place, and time. She appears well-developed and well-nourished. No distress.   HENT:   Head: Normocephalic and atraumatic.   Eyes: EOM are normal. No scleral icterus.   Neck: Normal range of motion.   Cardiovascular: Normal rate and regular rhythm.   No murmur heard.  Pulmonary/Chest: Effort normal and breath sounds normal. No respiratory distress.   Abdominal: Soft. Bowel sounds are normal. There is tenderness (Lower abdomen ).   Musculoskeletal: She exhibits edema (BL LE).   Neurological: She is alert and oriented to person, place, and time.   Skin: Skin is warm and dry. She is not diaphoretic.   Nursing note and vitals reviewed.      Consults:   Consults (From admission, onward)        Status Ordering Provider     Inpatient consult to General surgery  Once     Provider:  (Not yet assigned)    THOMAS Hammonds          * Constipation due to opioid therapy        Left lower quadrant abdominal pain  Patient with three days of worsening left sided abdominal pain with associated SOB. She reports being able to tolerate her normal PO diet without vomiting and that her fluid intake has been normal. She reports her usual constipation likely due to her chronic opioid use. No reports of melena, just occasional BRBPR due to known hemorrhoids. Although she has extensive back surgery, she denies any  "abdominal surgery. Initial lab work up shows grossly normal cbc without leukocytosis. CMP shows no major electrolyte derangements and normal LFT and bilirubin. CT scan of abdomen shows dilated distal small bowel loops noting slow flow through these loops. However findings were nonspecific, with differential including developing partial small bowel obstruction on the basis of adhesion versus nonspecific infectious or inflammatory enteritis. Also seen is a large amount of stool.     Differential diagnosis includes inflammatory/infectious enteritis, ileus due to chronic opioid use, severe constipation.     - continue IV ceftriaxone to cover infectious etiology with PO metronidazole to cover infectious colitis   - Suspect abdominal pain related to opioid induced constipation with inflammatory changes in bowel; Will wean off Morphine IV and patient will need to follow with outpatient pain specialist   - Added Lactulose 30mg TID with Miralax and Bisacodyl with plan to advance to enema if needed   - US for Mesenteric Ischemia with "No evidence hemodynamically significant stenosis in the celiac artery.  The SMA and EDMOND were not visualized due to overlying bowel gas."   - Advanced Diet from liquid to mechanical soft   - Continuing to follow CBC and CMP daily     Bradycardia, sinus  Patient with reports of new onset bradycardia and accompanying hypotension. However, per chart review, numerous clinic visits have documented vitals with bradycardia (pulses between 45-55) and BP of 90's/50-60's. Previous echo done in February of 2019 without concerning findings. Patient is hemodynamically stable at time of exam. Electrolytes wnl.     TTE:  · Normal left ventricular systolic function. The estimated ejection fraction is 60-65%.  · No wall motion abnormalities.  · Normal LV diastolic function.  · Normal right ventricular systolic function.  · The estimated PA systolic pressure is 38 mmHg.  · Intermediate central venous pressure (8 " mmHg).  · Severe left atrial enlargement.  · Bradycardic with heart rate in low to mid 40s throughout the study.      Partial small bowel obstruction  Left lower quadrant abdominal pain    - resolved with bowel rest    Pure hypercholesterolemia        Lymphedema of both lower extremities  - Chronic issue that patient has had for years  - Scheduled to see vascular for this on 1/30  - although tender to palpation, physical exam significant for bilateral swelling and normal BNP  - US LE ordered on 1/30: No evidence of DVT in B/L LE      Primary hypothyroidism  - Continue home levothyroxine  Thyroid Labs Latest Ref Rng & Units 1/29/2020 1/30/2020 1/31/2020   TSH 0.400 - 4.000 uIU/mL - - -   Free T4 0.71 - 1.51 ng/dL - - -   Sodium 136 - 145 mmol/L 140 143 139   Potassium 3.5 - 5.1 mmol/L 3.6 3.9 3.9   Chloride 95 - 110 mmol/L 103 106 105   Carbon Dioxide 23 - 29 mmol/L 32(H) 28 29   Glucose 70 - 110 mg/dL 89 84 118(H)   Blood Urea Nitrogen 8 - 23 mg/dL 10 4(L) 4(L)   Creatinine 0.5 - 1.4 mg/dL 0.7 0.8 0.8   Calcium 8.7 - 10.5 mg/dL 8.1(L) 8.5(L) 8.6(L)   Total Protein 6.0 - 8.4 g/dL 5.6(L) 5.8(L) 5.9(L)   Albumin 3.5 - 5.2 g/dL 3.0(L) 3.1(L) 3.2(L)   Total Bilirubin 0.1 - 1.0 mg/dL 0.3 0.2 0.3   AST 10 - 40 U/L 13 13 18   ALT 10 - 44 U/L 8(L) 6(L) 9(L)   Anion Gap 8 - 16 mmol/L 5(L) 9 5(L)   eGFR (African American) >60 mL/min/1.73 m:2 >60.0 >60.0 >60.0   eGFR (Non-African American) >60 mL/min/1.73 m:2 >60.0 >60.0 >60.0   WBC 3.90 - 12.70 K/uL 4.19 4.28 4.96   RBC 4.00 - 5.40 M/uL 3.55(L) 3.65(L) 3.49(L)   Hemoglobin 12.0 - 16.0 g/dL 10.0(L) 10.1(L) 9.5(L)   Hematocrit 37.0 - 48.5 % 31.1(L) 32.8(L) 31.7(L)   MCV 82 - 98 fL 88 90 91   MCH 27.0 - 31.0 pg 28.2 27.7 27.2   MCHC 32.0 - 36.0 g/dL 32.2 30.8(L) 30.0(L)   RDW 11.5 - 14.5 % 13.3 13.5 13.6   Platelets 150 - 350 K/uL 176 179 165   MPV 9.2 - 12.9 fL 10.6 10.7 10.8   Gran # 1.8 - 7.7 K/uL 2.2 2.0 2.7   Lymph # 1.0 - 4.8 K/uL 1.4 1.5 1.4   Mono # 0.3 - 1.0 K/uL 0.3 0.5  0.5   Eos # 0.0 - 0.5 K/uL 0.2 0.3 0.2   Baso # 0.00 - 0.20 K/uL 0.02 0.02 0.02   Gran % 38.0 - 73.0 % 53.0 46.2 55.3   Lymph % 18.0 - 48.0 % 32.7 33.9 28.8   Mono% 4.0 - 15.0 % 8.1 11.2 10.7   Eos % 0.0 - 8.0 % 5.5 7.5 4.6   Baso % 0.0 - 1.9 % 0.5 0.5 0.4   Prothrombin Time 9.0 - 12.5 sec - - -   INR 0.8 - 1.2 - - -   aPTT 21.0 - 32.0 sec - - -   Calcitonin <=7.6 pg/mL - - -           Urinary retention        Neurogenic bladder  Urinary retention    - Patient has chronic meadows, switched every 3 weeks  - Sight continues to look nonerythematous       Coronary artery disease involving native coronary artery of native heart without angina pectoris  Hyperlipidemia    - Continue ASA 81 mg and statin      GERD (gastroesophageal reflux disease)  - 40 mg pantoprazole at home, restarted    Drug-induced constipation  miralax  -Added Lactulose 30mg TID   -Will advance to enema if needed       Narcotic dependency, continuous        Chronic pain syndrome  Narcotic dependency    - Patient currently has dilaudid pump with daily dose increased to 3.67 mg dilaudid daily  - Also uses Norco  mg at home for breakthrough pain  - Will wean morphine IV 6mg from Q3 to Q6  - prn narcan for opioid reversal   - biscodyl suppository, Miralax, and Lactulose 30mg TID      Generalized anxiety disorder  - Did not continue home Seroquel in the presence of prolonged QT. Began PRN Lorazepam 0.5 Q6         Final Active Diagnoses:    Diagnosis Date Noted POA    PRINCIPAL PROBLEM:  Constipation due to opioid therapy [K59.03, T40.2X5A] 01/30/2020 Yes    Left lower quadrant abdominal pain [R10.32] 01/27/2020 Yes    Partial small bowel obstruction [K56.600] 01/27/2020 Yes    Bradycardia, sinus [R00.1] 01/27/2020 Unknown    Pure hypercholesterolemia [E78.00] 01/13/2020 Yes    Lymphedema of both lower extremities [I89.0] 03/02/2017 Yes     Chronic    Primary hypothyroidism [E03.9] 02/02/2017 Yes     Chronic    Urinary retention [R33.9]  12/21/2016 Yes     Chronic    Neurogenic bladder [N31.9] 09/27/2016 Yes     Chronic    GERD (gastroesophageal reflux disease) [K21.9] 08/16/2016 Yes     Chronic    Coronary artery disease involving native coronary artery of native heart without angina pectoris [I25.10] 08/16/2016 Yes     Chronic    Drug-induced constipation [K59.03] 08/16/2016 Yes     Chronic    Narcotic dependency, continuous [F11.20] 07/18/2014 Yes     Chronic    Chronic pain syndrome [G89.4] 05/01/2013 Yes     Chronic    Generalized anxiety disorder [F41.1]  Yes      Problems Resolved During this Admission:       Discharged Condition: stable    Disposition: Home or Self Care    Follow Up:  Follow-up Information     Mesfin Hodges Ii, MD On 2/6/2020.    Specialty:  Internal Medicine  Why:  at 10:20pm; previously scheduled PCP appointment  Contact information:  [address]  [facility] [address]  [phone]                 Patient Instructions:      Ambulatory Referral to Gastroenterology   Referral Priority: Routine Referral Type: Consultation   Referral Reason: Specialty Services Required   Requested Specialty: Gastroenterology   Number of Visits Requested: 1       Significant Diagnostic Studies: Labs:   CMP   Recent Labs   Lab 01/30/20  0353 01/31/20  0307    139   K 3.9 3.9    105   CO2 28 29   GLU 84 118*   BUN 4* 4*   CREATININE 0.8 0.8   CALCIUM 8.5* 8.6*   PROT 5.8* 5.9*   ALBUMIN 3.1* 3.2*   BILITOT 0.2 0.3   ALKPHOS 81 89   AST 13 18   ALT 6* 9*   ANIONGAP 9 5*   ESTGFRAFRICA >60.0 >60.0   EGFRNONAA >60.0 >60.0   , CBC   Recent Labs   Lab 01/30/20  0353 01/31/20  0307   WBC 4.28 4.96   HGB 10.1* 9.5*   HCT 32.8* 31.7*    165    and Troponin   Recent Labs   Lab 01/28/20  0856   TROPONINI <0.006       Pending Diagnostic Studies:     None         Medications:  Reconciled Home Medications:      Medication List      START taking these medications    amoxicillin-clavulanate 875-125mg 875-125 mg per  tablet  Commonly known as:  AUGMENTIN  Take 1 tablet by mouth every 12 (twelve) hours. for 3 days     gabapentin 100 MG capsule  Commonly known as:  NEURONTIN  Take 1 capsule (100 mg total) by mouth 3 (three) times daily.     lactulose 20 gram/30 mL Soln  Commonly known as:  CHRONULAC  Take 45 mLs (30 g total) by mouth 3 (three) times daily as needed (constipation). Hold for loose or frequent stools     polyethylene glycol 17 gram Pwpk  Commonly known as:  GLYCOLAX  Take 17 g by mouth once daily.        CHANGE how you take these medications    senna 8.6 mg tablet  Commonly known as:  Senna Lax  Take 2 tablets by mouth 2 (two) times daily.  What changed:    · medication strength  · how much to take  · when to take this        CONTINUE taking these medications    aspirin 81 MG EC tablet  Commonly known as:  ECOTRIN  Take 1 tablet (81 mg total) by mouth once daily.     atorvastatin 80 MG tablet  Commonly known as:  LIPITOR  TAKE 1 TABLET BY MOUTH DAILY     butalbital-acetaminophen-caffeine -40 mg -40 mg per tablet  Commonly known as:  FIORICET, ESGIC  Take 1 tablet by mouth every 4 (four) hours as needed for Headaches.     FLUoxetine 20 MG capsule  Take 3 capsules (60 mg total) by mouth once daily.     furosemide 40 MG tablet  Commonly known as:  LASIX  Take 2 tablets (80 mg total) by mouth every morning.     HYDROcodone-acetaminophen  mg per tablet  Commonly known as:  NORCO  Take 1 tablet by mouth every 6 (six) hours as needed for Pain.     INTRATHECAL PAIN PUMP COMPOUND  Hydromorphone (7.5 mg/mL) infusion at 3.6799 mg/day (0.1533 mg/hr) out of a total reservoir volume of 37.3 mL     levothyroxine 125 MCG tablet  Commonly known as:  SYNTHROID  Take 1 tablet (125 mcg total) by mouth before breakfast.     lidocaine 5 %  Commonly known as:  LIDODERM  Place 1 patch onto the skin daily as needed (pain). USE AS DIRECTED WEAR FOR A MAXIMUM OF 12 HOURS     oxybutynin 15 MG Tr24  Commonly known as:  DITROPAN  XL  Take 15 mg by mouth once daily.     pantoprazole 40 MG tablet  Commonly known as:  PROTONIX  Take 1 tablet (40 mg total) by mouth once daily.     potassium citrate 10 mEq (1,080 mg) Tbsr  Commonly known as:  UROCIT-K  Take 1 tablet (10 mEq total) by mouth once daily.     promethazine 25 MG tablet  Commonly known as:  PHENERGAN  Take 25 mg by mouth every 6 (six) hours as needed for Nausea.     QUEtiapine 100 MG Tab  Commonly known as:  SEROQUEL  TAKE 1 TABLET (100 MG TOTAL) BY MOUTH EVERY EVENING.     traZODone 100 MG tablet  Commonly known as:  DESYREL  Take 3 tablets (300 mg total) by mouth every evening.        STOP taking these medications    docusate sodium 100 MG capsule  Commonly known as:  COLACE     lamoTRIgine 25 MG tablet  Commonly known as:  LAMICTAL            Indwelling Lines/Drains at time of discharge:   Lines/Drains/Airways     Drain                 Suprapubic Catheter 10/31/19 1317 100% silicone 20 Fr. 92 days                Time spent on the discharge of patient: 45 minutes  Patient was seen and examined on the date of discharge and determined to be suitable for discharge.         Nic Peres MD  Department of Hospital Medicine  [facility]

## 2020-01-31 NOTE — ASSESSMENT & PLAN NOTE
- Continue home levothyroxine  Thyroid Labs Latest Ref Rng & Units 1/29/2020 1/30/2020 1/31/2020   TSH 0.400 - 4.000 uIU/mL - - -   Free T4 0.71 - 1.51 ng/dL - - -   Sodium 136 - 145 mmol/L 140 143 139   Potassium 3.5 - 5.1 mmol/L 3.6 3.9 3.9   Chloride 95 - 110 mmol/L 103 106 105   Carbon Dioxide 23 - 29 mmol/L 32(H) 28 29   Glucose 70 - 110 mg/dL 89 84 118(H)   Blood Urea Nitrogen 8 - 23 mg/dL 10 4(L) 4(L)   Creatinine 0.5 - 1.4 mg/dL 0.7 0.8 0.8   Calcium 8.7 - 10.5 mg/dL 8.1(L) 8.5(L) 8.6(L)   Total Protein 6.0 - 8.4 g/dL 5.6(L) 5.8(L) 5.9(L)   Albumin 3.5 - 5.2 g/dL 3.0(L) 3.1(L) 3.2(L)   Total Bilirubin 0.1 - 1.0 mg/dL 0.3 0.2 0.3   AST 10 - 40 U/L 13 13 18   ALT 10 - 44 U/L 8(L) 6(L) 9(L)   Anion Gap 8 - 16 mmol/L 5(L) 9 5(L)   eGFR (African American) >60 mL/min/1.73 m:2 >60.0 >60.0 >60.0   eGFR (Non-African American) >60 mL/min/1.73 m:2 >60.0 >60.0 >60.0   WBC 3.90 - 12.70 K/uL 4.19 4.28 4.96   RBC 4.00 - 5.40 M/uL 3.55(L) 3.65(L) 3.49(L)   Hemoglobin 12.0 - 16.0 g/dL 10.0(L) 10.1(L) 9.5(L)   Hematocrit 37.0 - 48.5 % 31.1(L) 32.8(L) 31.7(L)   MCV 82 - 98 fL 88 90 91   MCH 27.0 - 31.0 pg 28.2 27.7 27.2   MCHC 32.0 - 36.0 g/dL 32.2 30.8(L) 30.0(L)   RDW 11.5 - 14.5 % 13.3 13.5 13.6   Platelets 150 - 350 K/uL 176 179 165   MPV 9.2 - 12.9 fL 10.6 10.7 10.8   Gran # 1.8 - 7.7 K/uL 2.2 2.0 2.7   Lymph # 1.0 - 4.8 K/uL 1.4 1.5 1.4   Mono # 0.3 - 1.0 K/uL 0.3 0.5 0.5   Eos # 0.0 - 0.5 K/uL 0.2 0.3 0.2   Baso # 0.00 - 0.20 K/uL 0.02 0.02 0.02   Gran % 38.0 - 73.0 % 53.0 46.2 55.3   Lymph % 18.0 - 48.0 % 32.7 33.9 28.8   Mono% 4.0 - 15.0 % 8.1 11.2 10.7   Eos % 0.0 - 8.0 % 5.5 7.5 4.6   Baso % 0.0 - 1.9 % 0.5 0.5 0.4   Prothrombin Time 9.0 - 12.5 sec - - -   INR 0.8 - 1.2 - - -   aPTT 21.0 - 32.0 sec - - -   Calcitonin <=7.6 pg/mL - - -

## 2020-01-31 NOTE — PLAN OF CARE
[facility]    HOME HEALTH ORDERS  FACE TO FACE ENCOUNTER    Patient Name: Tasha Hawley  YOB: 1955    PCP: Mesfin Hodges Ii, MD   PCP Address: [address] / [place] [address]  PCP Phone Number: [phone]  PCP Fax: [phone]    Encounter Date: 01/31/2020    Admit to Home Health    Diagnoses:  Active Hospital Problems    Diagnosis  POA    *Constipation due to opioid therapy [K59.03, T40.2X5A]  Yes    Left lower quadrant abdominal pain [R10.32]  Yes     Priority: 1 - High    Partial small bowel obstruction [K56.600]  Yes    Bradycardia, sinus [R00.1]  Unknown    Pure hypercholesterolemia [E78.00]  Yes    Lymphedema of both lower extremities [I89.0]  Yes     Chronic    Primary hypothyroidism [E03.9]  Yes     Chronic    Urinary retention [R33.9]  Yes     Chronic    Neurogenic bladder [N31.9]  Yes     Chronic    GERD (gastroesophageal reflux disease) [K21.9]  Yes     Chronic    Coronary artery disease involving native coronary artery of native heart without angina pectoris [I25.10]  Yes     Chronic    Drug-induced constipation [K59.03]  Yes     Chronic    Narcotic dependency, continuous [F11.20]  Yes     Chronic    Chronic pain syndrome [G89.4]  Yes     Chronic    Generalized anxiety disorder [F41.1]  Yes      Resolved Hospital Problems   No resolved problems to display.       Future Appointments   Date Time Provider Department Center   2/4/2020  1:00 PM Juan A Madera MD [facility] PSYCH [facility] y   2/6/2020 10:20 AM Mesfin Hodges II, MD [facility] IM [facility] PCW   3/4/2020 11:20 AM Shireen Mayo MD [facility] UROLOGY [facility]   3/18/2020 10:40 AM Holden Armenta MD [facility] CARDIO [facility]     Follow-up Information     Mesfin Hodges Ii, MD On 2/6/2020.    Specialty:  Internal Medicine  Why:  at 10:20pm; previously scheduled PCP appointment  Contact information:  [address]  [facility] [address]  [phone]                     I have seen and  examined this patient face to face today. My clinical findings that support the need for the home health skilled services and home bound status are the following:  Weakness/numbness causing balance and gait disturbance due to Weakness/Debility and Surgery making it taxing to leave home.  Requiring assistive device to leave home due to unsteady gait caused by  Weakness/Debility and Surgery.    Allergies:  Review of patient's allergies indicates:   Allergen Reactions    (d)-limonene flavor      Other reaction(s): difficult intubation  Other reaction(s): Difficulty breathing    Bactrim [sulfamethoxazole-trimethoprim] Anaphylaxis    Benadryl [diphenhydramine hcl] Shortness Of Breath    Imitrex [sumatriptan succinate] Shortness Of Breath    Topamax [topiramate] Shortness Of Breath    Vancomycin Shortness Of Breath     Rash    Butorphanol tartrate     Darvocet a500 [propoxyphene n-acetaminophen]      Other reaction(s): Difficulty breathing    Fentanyl      Other reaction(s): Vomiting  Other reaction(s): Nausea  Other reaction(s): Itching  swelling    White petrolatum-zinc     Zinc oxide-white petrolatum      Other reaction(s): Difficulty breathing    Latex, natural rubber Itching and Rash    Phenytoin Rash and Other (See Comments)     Trouble breathing       Diet: cardiac diet    Activities: activity as tolerated    Nursing:   SN to complete comprehensive assessment including routine vital signs. Instruct on disease process and s/s of complications to report to MD. Review/verify medication list sent home with the patient at time of discharge  and instruct patient/caregiver as needed. Frequency may be adjusted depending on start of care date.    Notify MD if SBP > 160 or < 90; DBP > 90 or < 50; HR > 120 or < 50; Temp > 101; Other:         CONSULTS:    Physical Therapy to evaluate and treat. Evaluate for home safety and equipment needs; Establish/upgrade home exercise program. Perform / instruct on therapeutic  exercises, gait training, transfer training, and Range of Motion.  Occupational Therapy to evaluate and treat. Evaluate home environment for safety and equipment needs. Perform/Instruct on transfers, ADL training, ROM, and therapeutic exercises.  Aide to provide assistance with personal care, ADLs, and vital signs.    MISCELLANEOUS CARE:  Motley Care: Instruct patient/caregiver to empty Motley bag.  Change Motley every month. Keep Dressing clean and dry.       Medications: Review discharge medications with patient and family and provide education.      Current Discharge Medication List      START taking these medications    Details   amoxicillin-clavulanate 875-125mg (AUGMENTIN) 875-125 mg per tablet Take 1 tablet by mouth every 12 (twelve) hours. for 3 days  Qty: 5 tablet, Refills: 0      gabapentin (NEURONTIN) 100 MG capsule Take 1 capsule (100 mg total) by mouth 3 (three) times daily.  Qty: 90 capsule, Refills: 2      lactulose (CHRONULAC) 20 gram/30 mL Soln Take 45 mLs (30 g total) by mouth 3 (three) times daily as needed (constipation). Hold for loose or frequent stools  Qty: 1350 mL, Refills: 0    Comments: Instruction: Please use  medication if you have not had a BM in 3 days and continue as needed every 8 hours until you have a BM.      polyethylene glycol (GLYCOLAX) 17 gram PwPk Take 17 g by mouth once daily.  Qty: 30 each, Refills: 2         CONTINUE these medications which have CHANGED    Details   furosemide (LASIX) 40 MG tablet Take 2 tablets (80 mg total) by mouth every morning.    Associated Diagnoses: Edema, unspecified type      potassium citrate (UROCIT-K) 10 mEq (1,080 mg) TbSR Take 1 tablet (10 mEq total) by mouth once daily.    Associated Diagnoses: Recurrent UTI      senna (SENNA LAX) 8.6 mg tablet Take 2 tablets by mouth 2 (two) times daily.         CONTINUE these medications which have NOT CHANGED    Details   aspirin (ECOTRIN) 81 MG EC tablet Take 1 tablet (81 mg total) by mouth once  daily.  Refills: 0      atorvastatin (LIPITOR) 80 MG tablet TAKE 1 TABLET BY MOUTH DAILY  Qty: 90 tablet, Refills: 3    Associated Diagnoses: Mixed hyperlipidemia      butalbital-acetaminophen-caffeine -40 mg (FIORICET, ESGIC) -40 mg per tablet Take 1 tablet by mouth every 4 (four) hours as needed for Headaches.       FLUoxetine 20 MG capsule Take 3 capsules (60 mg total) by mouth once daily.  Qty: 270 capsule, Refills: 3    Associated Diagnoses: Anxiety      HYDROcodone-acetaminophen (NORCO)  mg per tablet Take 1 tablet by mouth every 6 (six) hours as needed for Pain.  Qty: 15 tablet, Refills: 0      intrathecal pain pump compound Hydromorphone (7.5 mg/mL) infusion at 3.6799 mg/day (0.1533 mg/hr) out of a total reservoir volume of 37.3 mL      levothyroxine (SYNTHROID) 125 MCG tablet Take 1 tablet (125 mcg total) by mouth before breakfast.  Qty: 90 tablet, Refills: 3    Associated Diagnoses: Primary hypothyroidism      lidocaine (LIDODERM) 5 % Place 1 patch onto the skin daily as needed (pain). USE AS DIRECTED WEAR FOR A MAXIMUM OF 12 HOURS  Refills: 5      oxybutynin (DITROPAN XL) 15 MG TR24 Take 15 mg by mouth once daily.      pantoprazole (PROTONIX) 40 MG tablet Take 1 tablet (40 mg total) by mouth once daily.  Qty: 90 tablet, Refills: 3    Associated Diagnoses: Esophageal dysphagia      promethazine (PHENERGAN) 25 MG tablet Take 25 mg by mouth every 6 (six) hours as needed for Nausea.      QUEtiapine (SEROQUEL) 100 MG Tab TAKE 1 TABLET (100 MG TOTAL) BY MOUTH EVERY EVENING.  Qty: 90 tablet, Refills: 1      traZODone (DESYREL) 100 MG tablet Take 3 tablets (300 mg total) by mouth every evening.  Qty: 270 tablet, Refills: 1    Associated Diagnoses: Insomnia, unspecified type         STOP taking these medications       docusate sodium (COLACE) 100 MG capsule Comments:   Reason for Stopping:         lamotrigine (LAMICTAL) 25 MG tablet Comments:   Reason for Stopping:               I certify that  this patient is confined to her home and needs intermittent skilled nursing care, physical therapy and occupational therapy.

## 2020-02-01 NOTE — PLAN OF CARE
02/01/20 1500   Final Note   Assessment Type Final Discharge Note     Patient discharged home with [facility] on 1/31/2020. Discharge summary faxed to [facility].

## 2020-02-02 PROCEDURE — G0180 MD CERTIFICATION HHA PATIENT: HCPCS | Mod: ,,, | Performed by: HOSPITALIST

## 2020-02-02 PROCEDURE — G0180 PR HOME HEALTH MD CERTIFICATION: ICD-10-PCS | Mod: ,,, | Performed by: HOSPITALIST

## 2020-02-03 ENCOUNTER — TELEPHONE (OUTPATIENT)
Dept: INTERNAL MEDICINE | Facility: CLINIC | Age: 65
End: 2020-02-03

## 2020-02-03 DIAGNOSIS — R53.81 DEBILITY: Primary | ICD-10-CM

## 2020-02-03 NOTE — TELEPHONE ENCOUNTER
----- Message from Uriah Gentile sent at 2/3/2020  3:15 PM CST -----  Contact: Oliver Adams    Anjali state the patient need an order for a bedside commode. Anjali state the order can be fax to [facility]. Anjali also state the patient gain 3 lbs over night. Please advise

## 2020-02-04 ENCOUNTER — OFFICE VISIT (OUTPATIENT)
Dept: PSYCHIATRY | Facility: CLINIC | Age: 65
End: 2020-02-04
Payer: MEDICARE

## 2020-02-04 VITALS
DIASTOLIC BLOOD PRESSURE: 53 MMHG | BODY MASS INDEX: 31.05 KG/M2 | WEIGHT: 186.38 LBS | HEART RATE: 58 BPM | SYSTOLIC BLOOD PRESSURE: 96 MMHG | HEIGHT: 65 IN

## 2020-02-04 DIAGNOSIS — F41.9 ANXIETY: ICD-10-CM

## 2020-02-04 DIAGNOSIS — G47.00 INSOMNIA, UNSPECIFIED TYPE: ICD-10-CM

## 2020-02-04 PROCEDURE — 99999 PR PBB SHADOW E&M-EST. PATIENT-LVL II: ICD-10-PCS | Mod: PBBFAC,HCNC,, | Performed by: PSYCHIATRY & NEUROLOGY

## 2020-02-04 PROCEDURE — 99215 OFFICE O/P EST HI 40 MIN: CPT | Mod: HCNC,S$GLB,, | Performed by: PSYCHIATRY & NEUROLOGY

## 2020-02-04 PROCEDURE — 99215 PR OFFICE/OUTPT VISIT, EST, LEVL V, 40-54 MIN: ICD-10-PCS | Mod: HCNC,S$GLB,, | Performed by: PSYCHIATRY & NEUROLOGY

## 2020-02-04 PROCEDURE — 3008F BODY MASS INDEX DOCD: CPT | Mod: HCNC,CPTII,S$GLB, | Performed by: PSYCHIATRY & NEUROLOGY

## 2020-02-04 PROCEDURE — 99999 PR PBB SHADOW E&M-EST. PATIENT-LVL II: CPT | Mod: PBBFAC,HCNC,, | Performed by: PSYCHIATRY & NEUROLOGY

## 2020-02-04 PROCEDURE — 3008F PR BODY MASS INDEX (BMI) DOCUMENTED: ICD-10-PCS | Mod: HCNC,CPTII,S$GLB, | Performed by: PSYCHIATRY & NEUROLOGY

## 2020-02-04 RX ORDER — QUETIAPINE FUMARATE 25 MG/1
TABLET, FILM COATED ORAL
Qty: 90 TABLET | Refills: 1 | Status: SHIPPED | OUTPATIENT
Start: 2020-02-04 | End: 2020-04-08 | Stop reason: SDUPTHER

## 2020-02-04 RX ORDER — QUETIAPINE FUMARATE 100 MG/1
TABLET, FILM COATED ORAL
Qty: 90 TABLET | Refills: 1 | Status: SHIPPED | OUTPATIENT
Start: 2020-02-04 | End: 2020-05-19 | Stop reason: SDUPTHER

## 2020-02-04 RX ORDER — TRAZODONE HYDROCHLORIDE 100 MG/1
300 TABLET ORAL NIGHTLY
Qty: 270 TABLET | Refills: 1 | Status: SHIPPED | OUTPATIENT
Start: 2020-02-04 | End: 2020-05-19 | Stop reason: SDUPTHER

## 2020-02-04 RX ORDER — FLUOXETINE HYDROCHLORIDE 20 MG/1
60 CAPSULE ORAL DAILY
Qty: 270 CAPSULE | Refills: 1 | Status: SHIPPED | OUTPATIENT
Start: 2020-02-04 | End: 2020-07-30 | Stop reason: SDUPTHER
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
## 2020-02-05 ENCOUNTER — TELEPHONE (OUTPATIENT)
Dept: ENDOSCOPY | Facility: HOSPITAL | Age: 65
End: 2020-02-05

## 2020-02-05 ENCOUNTER — OFFICE VISIT (OUTPATIENT)
Dept: GASTROENTEROLOGY | Facility: CLINIC | Age: 65
End: 2020-02-05
Payer: MEDICARE

## 2020-02-05 VITALS
HEART RATE: 52 BPM | SYSTOLIC BLOOD PRESSURE: 84 MMHG | HEIGHT: 65 IN | DIASTOLIC BLOOD PRESSURE: 51 MMHG | WEIGHT: 176.81 LBS | BODY MASS INDEX: 29.46 KG/M2

## 2020-02-05 DIAGNOSIS — K59.03 DRUG-INDUCED CONSTIPATION: Primary | Chronic | ICD-10-CM

## 2020-02-05 DIAGNOSIS — Z98.890 S/P NISSEN FUNDOPLICATION (WITHOUT GASTROSTOMY TUBE) PROCEDURE: ICD-10-CM

## 2020-02-05 DIAGNOSIS — G89.29 OTHER CHRONIC PAIN: ICD-10-CM

## 2020-02-05 DIAGNOSIS — K92.1 HEMATOCHEZIA: ICD-10-CM

## 2020-02-05 DIAGNOSIS — R10.9 LEFT SIDED ABDOMINAL PAIN: ICD-10-CM

## 2020-02-05 DIAGNOSIS — R13.19 ESOPHAGEAL DYSPHAGIA: ICD-10-CM

## 2020-02-05 PROCEDURE — 99499 RISK ADDL DX/OHS AUDIT: ICD-10-PCS | Mod: S$GLB,,, | Performed by: NURSE PRACTITIONER

## 2020-02-05 PROCEDURE — 3008F BODY MASS INDEX DOCD: CPT | Mod: HCNC,CPTII,S$GLB, | Performed by: NURSE PRACTITIONER

## 2020-02-05 PROCEDURE — 99999 PR PBB SHADOW E&M-EST. PATIENT-LVL V: ICD-10-PCS | Mod: PBBFAC,HCNC,, | Performed by: NURSE PRACTITIONER

## 2020-02-05 PROCEDURE — 3008F PR BODY MASS INDEX (BMI) DOCUMENTED: ICD-10-PCS | Mod: HCNC,CPTII,S$GLB, | Performed by: NURSE PRACTITIONER

## 2020-02-05 PROCEDURE — 99215 PR OFFICE/OUTPT VISIT, EST, LEVL V, 40-54 MIN: ICD-10-PCS | Mod: HCNC,S$GLB,, | Performed by: NURSE PRACTITIONER

## 2020-02-05 PROCEDURE — 99499 UNLISTED E&M SERVICE: CPT | Mod: S$GLB,,, | Performed by: NURSE PRACTITIONER

## 2020-02-05 PROCEDURE — 99999 PR PBB SHADOW E&M-EST. PATIENT-LVL V: CPT | Mod: PBBFAC,HCNC,, | Performed by: NURSE PRACTITIONER

## 2020-02-05 PROCEDURE — 99215 OFFICE O/P EST HI 40 MIN: CPT | Mod: HCNC,S$GLB,, | Performed by: NURSE PRACTITIONER

## 2020-02-05 RX ORDER — TIZANIDINE 4 MG/1
TABLET ORAL
COMMUNITY
Start: 2020-01-23 | End: 2020-06-17

## 2020-02-05 RX ORDER — OXYCODONE AND ACETAMINOPHEN 10; 325 MG/1; MG/1
TABLET ORAL
COMMUNITY
Start: 2020-01-31 | End: 2020-06-17

## 2020-02-05 NOTE — PROGRESS NOTES
[facility] Consultation Note    Reason for Consult:  The primary encounter diagnosis was Drug-induced constipation. Diagnoses of Left sided abdominal pain, Esophageal dysphagia, Hematochezia, S/P Nissen fundoplication (without gastrostomy tube) procedure, and Other chronic pain were also pertinent to this visit.    PCP:   Mesfin Hodges Ii   [address] / [place] [address]    Referring MD:  Nic Peres Md  [address] [place] [address]    Initial History of Present Illness (HPI):  This is a 64 y.o. female here for evaluation of Left sided abd pain. Established patient, New to me. Accompanied by .   She was recently in the hospital.  Admitted on 01/27/2020 for acute abdominal pain.  Her abdominal pelvis CT scan was concerning for partial bowel obstruction. General surgery was consulted; contrast challenge demonstrated rapid transit of contrast to the colon, not concerning for obstruction.   Usually suffers from constipation. Takes three stool softeners, had some miralax, did take lactulose as an inpatient. Had some lactulose and developed diarrhea.  Diarrhea has resolved.   Had shooting pains in her left side that started last week.  Has not improved. Sharp pain.   Has a pain pump  Has some nausea no vomiting.   Reflux - yes, protonix 40 mg QAM  GI bleeding - hematochezia  NSAID usage -ASA, aleve sometimes        ROS:  Constitutional: No fevers, +chills, No weight loss  ENT:  +Hard of hearing  CV: + chest pain, no palpitation, +BLE  Pulm: No cough, + shortness of breath, no wheezing  Ophtho: No vision changes  GI: see HPI  Derm: No rash  Heme:  + easy bruising  MSK: + Joint pain, chronic pain on pain pump  : + dysuria, +hematuria  Neuro: No syncope, No seizure, no strokes  Psych: + anxiety, + depression    Medical History:  has a past medical history of Anticoagulant long-term use, Anxiety, Arthritis, Bilateral lower extremity edema, Carotid artery  occlusion, Cataract, Coronary artery disease, Depression, Fever blister, Hypothyroid, Iron deficiency anemia, Lumbar radiculopathy, Ocular migraine, and Sleep apnea.    Surgical History:  has a past surgical history that includes Hysterectomy (1975); Back surgery; pain pump placement; Spine surgery (5-13-13); Cataract extraction w/  intraocular lens implant (Left); Spinal cord stimulator removal; Esophagogastroduodenoscopy (N/A, 5/23/2018); Tonsillectomy; Adenoidectomy; Cardiac catheterization (2016); Cystoscopic litholapaxy (N/A, 6/27/2019); Cystoscopic litholapaxy (N/A, 9/3/2019); and Replacement of catheter (N/A, 10/31/2019).    Family History: family history includes Cancer in her father; Cancer (age of onset: 55) in her mother; Colon cancer in her maternal uncle; Esophageal cancer in her father; Heart disease in her maternal uncle; No Known Problems in her brother, brother, maternal aunt, maternal grandfather, paternal aunt, paternal grandfather, paternal grandmother, paternal uncle, and sister; Parkinsonism in her maternal grandmother; Tremor in her maternal grandmother..     Social History:  reports that she has never smoked. She has never used smokeless tobacco. She reports that she does not drink alcohol or use drugs.    Review of patient's allergies indicates:   Allergen Reactions    (d)-limonene flavor      Other reaction(s): difficult intubation  Other reaction(s): Difficulty breathing    Bactrim [sulfamethoxazole-trimethoprim] Anaphylaxis    Benadryl [diphenhydramine hcl] Shortness Of Breath    Imitrex [sumatriptan succinate] Shortness Of Breath    Topamax [topiramate] Shortness Of Breath    Vancomycin Shortness Of Breath     Rash    Butorphanol tartrate     Darvocet a500 [propoxyphene n-acetaminophen]      Other reaction(s): Difficulty breathing    Fentanyl      Other reaction(s): Vomiting  Other reaction(s): Nausea  Other reaction(s): Itching  swelling    White petrolatum-zinc     Zinc  oxide-white petrolatum      Other reaction(s): Difficulty breathing    Latex, natural rubber Itching and Rash    Phenytoin Rash and Other (See Comments)     Trouble breathing       Medication List with Changes/Refills   Current Medications    ASPIRIN (ECOTRIN) 81 MG EC TABLET    Take 1 tablet (81 mg total) by mouth once daily.    ATORVASTATIN (LIPITOR) 80 MG TABLET    TAKE 1 TABLET BY MOUTH DAILY    BUTALBITAL-ACETAMINOPHEN-CAFFEINE -40 MG (FIORICET, ESGIC) -40 MG PER TABLET    Take 1 tablet by mouth every 4 (four) hours as needed for Headaches.     FLUOXETINE 20 MG CAPSULE    Take 3 capsules (60 mg total) by mouth once daily.    FUROSEMIDE (LASIX) 40 MG TABLET    Take 2 tablets (80 mg total) by mouth every morning.    GABAPENTIN (NEURONTIN) 100 MG CAPSULE    Take 1 capsule (100 mg total) by mouth 3 (three) times daily.    HYDROCODONE-ACETAMINOPHEN (NORCO)  MG PER TABLET    Take 1 tablet by mouth every 6 (six) hours as needed for Pain.    INTRATHECAL PAIN PUMP COMPOUND    Hydromorphone (7.5 mg/mL) infusion at 3.6799 mg/day (0.1533 mg/hr) out of a total reservoir volume of 37.3 mL    LACTULOSE (CHRONULAC) 20 GRAM/30 ML SOLN    Take 45 mLs (30 g total) by mouth 3 (three) times daily as needed (constipation). Hold for loose or frequent stools    LEVOTHYROXINE (SYNTHROID) 125 MCG TABLET    Take 1 tablet (125 mcg total) by mouth before breakfast.    LIDOCAINE (LIDODERM) 5 %    Place 1 patch onto the skin daily as needed (pain). USE AS DIRECTED WEAR FOR A MAXIMUM OF 12 HOURS    OXYBUTYNIN (DITROPAN XL) 15 MG TR24    Take 15 mg by mouth once daily.    OXYCODONE-ACETAMINOPHEN (PERCOCET)  MG PER TABLET        PANTOPRAZOLE (PROTONIX) 40 MG TABLET    Take 1 tablet (40 mg total) by mouth once daily.    POLYETHYLENE GLYCOL (GLYCOLAX) 17 GRAM PWPK    Take 17 g by mouth once daily.    POTASSIUM CITRATE (UROCIT-K) 10 MEQ (1,080 MG) TBSR    Take 1 tablet (10 mEq total) by mouth once daily.    PROMETHAZINE  "(PHENERGAN) 25 MG TABLET    Take 25 mg by mouth every 6 (six) hours as needed for Nausea.    QUETIAPINE (SEROQUEL) 100 MG TAB    TAKE 1 TABLET (100 MG TOTAL) BY MOUTH EVERY EVENING.    QUETIAPINE (SEROQUEL) 25 MG TAB    Take 12.5 mg twice daily as needed for anxiety    SENNA (SENNA LAX) 8.6 MG TABLET    Take 2 tablets by mouth 2 (two) times daily.    TIZANIDINE (ZANAFLEX) 4 MG TABLET        TRAZODONE (DESYREL) 100 MG TABLET    Take 3 tablets (300 mg total) by mouth every evening.         Objective Findings:    Vital Signs:  BP (!) 84/51 (BP Location: Left arm)   Pulse (!) 52   Ht 5' 5" (1.651 m)   Wt 80.2 kg (176 lb 12.9 oz)   LMP  (LMP Unknown)   BMI 29.42 kg/m²   Body mass index is 29.42 kg/m².    Physical Exam:  General Appearance: Well appearing in no acute distress  Eyes:    No scleral icterus  ENT:  +Hard of hearing  Lungs: CTA bilaterally, no wheezes, no rhonchi, no rales  Heart:  S1, S2 normal, no murmurs heard  Abdomen:  Non distended, soft, +Generalized abd tenderness  Extremities: BLE Edema. Tender to touch. Pt reports this is not as bad as it usually is  Skin: No petechiae or rash on exposed skin areas  Neurologic:  Alert and oriented x4  Psychiatric:  Normal speech mentation and affect    Labs:  Lab Results   Component Value Date    WBC 4.96 01/31/2020    HGB 9.5 (L) 01/31/2020    HCT 31.7 (L) 01/31/2020     01/31/2020    CHOL 122 02/21/2019    TRIG 56 02/21/2019    HDL 47 02/21/2019    ALT 9 (L) 01/31/2020    AST 18 01/31/2020     01/31/2020    K 3.9 01/31/2020     01/31/2020    CREATININE 0.8 01/31/2020    BUN 4 (L) 01/31/2020    CO2 29 01/31/2020    TSH 6.572 (H) 01/28/2020    INR 0.9 05/27/2014    HGBA1C 5.4 08/25/2016           Imaging reviewed:  1/30/2020 US MESENTERIC ISCHEMIA STUDY (No evidence hemodynamically significant stenosis in the celiac artery.  The SMA and EDMOND were not visualized due to overlying bowel gas.  No evidence of aneurysm.    Abd xray " 1/28/2020  Postoperative changes in the lower lumbar spine and baclofen pump as before.  Faintly visualized contrast material noted in the colon.  No significant small bowel dilatation.    Ct abd pelv 1/27/2020  1. Dilated distal small bowel loops noting slow flow through these loops.  The distal most small bowel is decompressed without discrete transition point identified however transition would likely reside within the right lower quadrant if present.  Findings overall are nonspecific, with differential including developing partial small bowel obstruction on the basis of adhesion versus nonspecific infectious or inflammatory enteritis.  Correlation is advised.  No pneumatosis or free air at this time.  Follow-up recommended.  2. Large amount of stool within the colon, suggesting constipation.  3. Moderate to large hiatal hernia.  4. Several additional findings above.    Endoscopy reviewed:    EGD 5/2018 Normal examined duodenum.                        - Normal stomach.                        - Extrinsic narrowing of the esophagus from your                         Nissen wrap. Dilated. May need surgical take down.                        - No specimens collected.    Screening Colonoscopy 2008 .  Excellent prep.  All way to the TI.  Diverticulosis of sigmoid colon.  Internal hemorrhoids.  No specimens removed.    Medical Decision Making:    Assessment:    Tasha Hawley is a 64 y.o. female here for:    1. Drug-induced constipation    2. Left sided abdominal pain    3. Esophageal dysphagia    4. Hematochezia    5. S/P Nissen fundoplication (without gastrostomy tube) procedure    6. Other chronic pain       Complex case. Pt recently admitted for acute abd pain. CT showed possible partial small bowel obstruction.  General surgery evaluated this and did not feel she had a small bowel obstruction.  Her main complaint is this left-sided abdominal pain that shoots all the way down into her legs.  She does have history  of chronic constipation but right now she was having diarrhea after taking lactulose as an inpatient.  I explained to patient that I am not sure that her left-sided abdominal pain is related to her GI tract.  It may be nerve related given her extensive back surgery hx and chronic pain that she has a dilaudid pain pump.   Today in clinic we discussed her imaging recently performed as an inpatient and previous endoscopy reports. We also discussed differentials for her pain.    Recommendations:  1. EGD to assess the dilation of Nissen wrap that has been done in the past. If this is unremarkable may consider barium swallow w/ tablet in the future.  2. Colonoscopy.  3. Bowels moving okay right now. She is going to try miralax 1-2 capfuls daily. If no response may consider Movantik or Relistor    F/u pending scopes    Order summary:  Orders Placed This Encounter    Case request GI: EGD (ESOPHAGOGASTRODUODENOSCOPY), COLONOSCOPY       REQUIRES 40 MINUTES TOTAL FACE TO FACE >50% SPENT IN COUNSELING AND COORDINATION OF CARE     Thank you for allowing me to participate in the care of Tasha Bronson, CYNDYP-C

## 2020-02-05 NOTE — LETTER
February 5, 2020      Nic Peres MD  [address]  [facility] [address]           [facility]  [address]  [facility] [address]  Phone: [phone]  Fax: [phone]          Patient: Tasha Hawley   MR Number: [id number]   YOB: 1955   Date of Visit: 2/5/2020       Dear Dr. Nic Peres:    Thank you for referring Tasha Hawley to me for evaluation. Attached you will find relevant portions of my assessment and plan of care.    If you have questions, please do not hesitate to call me. I look forward to following Tasha Hawley along with you.    Sincerely,    Merly Bronson, SHONDA    Enclosure  CC:  No Recipients    If you would like to receive this communication electronically, please contact [email or web address] or [phone] to request more information on [email or web address] access.    For providers and/or their staff who would like to refer a patient to [facility], please contact us through our one-stop-shop provider referral line, [facility], at [phone].    If you feel you have received this communication in error or would no longer like to receive these types of communications, please e-mail [email or web address]

## 2020-02-05 NOTE — TELEPHONE ENCOUNTER
Patient needs to be scheduled for EGD and Colonoscopy procedures. Is patient cleared from Cardiac standpoint?  Please advise.    Thanks,  Jazz

## 2020-02-05 NOTE — TELEPHONE ENCOUNTER
Patient's spouse in office to schedule EGD and Colonoscopy procedures ordered.  While reviewing patient's chart, it was noted that patient is currently followed by a Cardiologist. Explained to patient that cardiac clearance will need to be requested and  received prior to scheduling procedures,and once received, then patient will be contacted to schedule procedures. Patient's spouse verbalized understanding.

## 2020-02-06 ENCOUNTER — INITIAL CONSULT (OUTPATIENT)
Dept: CARDIOLOGY | Facility: CLINIC | Age: 65
End: 2020-02-06
Payer: MEDICARE

## 2020-02-06 ENCOUNTER — OFFICE VISIT (OUTPATIENT)
Dept: INTERNAL MEDICINE | Facility: CLINIC | Age: 65
End: 2020-02-06
Payer: MEDICARE

## 2020-02-06 VITALS
DIASTOLIC BLOOD PRESSURE: 54 MMHG | HEART RATE: 59 BPM | WEIGHT: 190.06 LBS | SYSTOLIC BLOOD PRESSURE: 114 MMHG | BODY MASS INDEX: 31.67 KG/M2 | HEIGHT: 65 IN

## 2020-02-06 VITALS
DIASTOLIC BLOOD PRESSURE: 60 MMHG | SYSTOLIC BLOOD PRESSURE: 80 MMHG | HEIGHT: 65 IN | HEART RATE: 66 BPM | BODY MASS INDEX: 29.42 KG/M2

## 2020-02-06 DIAGNOSIS — M79.604 PAIN IN BOTH LOWER EXTREMITIES: ICD-10-CM

## 2020-02-06 DIAGNOSIS — R60.0 BILATERAL LEG EDEMA: ICD-10-CM

## 2020-02-06 DIAGNOSIS — R10.9 ABDOMINAL PAIN, UNSPECIFIED ABDOMINAL LOCATION: Primary | ICD-10-CM

## 2020-02-06 DIAGNOSIS — M51.36 DEGENERATIVE DISC DISEASE, LUMBAR: Chronic | ICD-10-CM

## 2020-02-06 DIAGNOSIS — M79.662 PAIN IN BOTH LOWER LEGS: ICD-10-CM

## 2020-02-06 DIAGNOSIS — G95.9 CERVICAL MYELOPATHY: ICD-10-CM

## 2020-02-06 DIAGNOSIS — M46.96 INFLAMMATORY SPONDYLOPATHY OF LUMBAR REGION: ICD-10-CM

## 2020-02-06 DIAGNOSIS — I70.0 THORACIC AORTA ATHEROSCLEROSIS: ICD-10-CM

## 2020-02-06 DIAGNOSIS — M79.661 PAIN IN BOTH LOWER LEGS: ICD-10-CM

## 2020-02-06 DIAGNOSIS — I42.9 CARDIOMYOPATHY, UNSPECIFIED TYPE: ICD-10-CM

## 2020-02-06 DIAGNOSIS — I89.0 LYMPHEDEMA OF BOTH LOWER EXTREMITIES: Primary | Chronic | ICD-10-CM

## 2020-02-06 DIAGNOSIS — G89.4 CHRONIC PAIN SYNDROME: Chronic | ICD-10-CM

## 2020-02-06 DIAGNOSIS — M79.605 PAIN IN BOTH LOWER EXTREMITIES: ICD-10-CM

## 2020-02-06 DIAGNOSIS — F33.41 RECURRENT MAJOR DEPRESSION IN PARTIAL REMISSION: ICD-10-CM

## 2020-02-06 DIAGNOSIS — Z96.89 S/P INSERTION OF SPINAL CORD STIMULATOR: Chronic | ICD-10-CM

## 2020-02-06 PROCEDURE — 99215 PR OFFICE/OUTPT VISIT, EST, LEVL V, 40-54 MIN: ICD-10-PCS | Mod: HCNC,S$GLB,, | Performed by: INTERNAL MEDICINE

## 2020-02-06 PROCEDURE — 99499 UNLISTED E&M SERVICE: CPT | Mod: S$GLB,,, | Performed by: INTERNAL MEDICINE

## 2020-02-06 PROCEDURE — 99999 PR PBB SHADOW E&M-EST. PATIENT-LVL V: ICD-10-PCS | Mod: PBBFAC,HCNC,, | Performed by: INTERNAL MEDICINE

## 2020-02-06 PROCEDURE — 99215 OFFICE O/P EST HI 40 MIN: CPT | Mod: HCNC,S$GLB,, | Performed by: INTERNAL MEDICINE

## 2020-02-06 PROCEDURE — 99499 RISK ADDL DX/OHS AUDIT: ICD-10-PCS | Mod: S$GLB,,, | Performed by: INTERNAL MEDICINE

## 2020-02-06 PROCEDURE — 99999 PR PBB SHADOW E&M-EST. PATIENT-LVL III: ICD-10-PCS | Mod: PBBFAC,HCNC,, | Performed by: INTERNAL MEDICINE

## 2020-02-06 PROCEDURE — 99999 PR PBB SHADOW E&M-EST. PATIENT-LVL III: CPT | Mod: PBBFAC,HCNC,, | Performed by: INTERNAL MEDICINE

## 2020-02-06 PROCEDURE — 99205 OFFICE O/P NEW HI 60 MIN: CPT | Mod: HCNC,S$GLB,, | Performed by: INTERNAL MEDICINE

## 2020-02-06 PROCEDURE — 99999 PR PBB SHADOW E&M-EST. PATIENT-LVL V: CPT | Mod: PBBFAC,HCNC,, | Performed by: INTERNAL MEDICINE

## 2020-02-06 PROCEDURE — 3008F PR BODY MASS INDEX (BMI) DOCUMENTED: ICD-10-PCS | Mod: HCNC,CPTII,S$GLB, | Performed by: INTERNAL MEDICINE

## 2020-02-06 PROCEDURE — 3008F BODY MASS INDEX DOCD: CPT | Mod: HCNC,CPTII,S$GLB, | Performed by: INTERNAL MEDICINE

## 2020-02-06 PROCEDURE — 99205 PR OFFICE/OUTPT VISIT, NEW, LEVL V, 60-74 MIN: ICD-10-PCS | Mod: HCNC,S$GLB,, | Performed by: INTERNAL MEDICINE

## 2020-02-06 RX ORDER — ASCORBIC ACID 500 MG
500 TABLET ORAL DAILY
Status: ON HOLD | COMMUNITY
End: 2020-12-15

## 2020-02-06 RX ORDER — TORSEMIDE 10 MG/1
10 TABLET ORAL DAILY
Qty: 7 TABLET | Refills: 0 | Status: ON HOLD | OUTPATIENT
Start: 2020-02-06 | End: 2020-05-17 | Stop reason: HOSPADM

## 2020-02-06 NOTE — PROGRESS NOTES
[facility]    CC: Lymphedema    Patient ID: Tasha Hawley is a 64 y.o. female with a past medical history of BLE lymphedema, CAD, lumbar radiculopathy, anxiety, hypothyroidism, CECI (on CPAP), neurogenic bladder with meadows at home (changed every 3 weeks), and chronic back pain since a work accident in 1997 (has had 12 back surgeries) on dilaudid pump, who presents for an initial appointment.  Pertinent history/events are as follows:     -Pt kindly referred by Dr. Armenta for evaluation of BLE lymphedema.      HPI:  Mrs. Hawley reports have BLE edema for several years.  States BLE edema became significantly worse after intrathecal pain pump was placed.  Pt states her dilaudid dosage was recently increased, which resulted in worsening of the BLE edema.  She reports constant pain in both legs.  No tissue loss.  No smoking history.  Exam shows 2+ pitting BLE edema with changes of lymphedema.  Pt has severe tenderness to palpation of both legs with light touch.  BLE Venous Ultrasound on 1/30/2020 showed no evidence of deep venous thrombosis in either lower extremity.    Past Medical History:   Diagnosis Date    Anticoagulant long-term use     Anxiety     Arthritis     Bilateral lower extremity edema     severe chronic    Carotid artery occlusion     Cataract     Coronary artery disease     subtotalled LAD with collateral    Depression     Fever blister     Hypothyroid     Iron deficiency anemia     Lumbar radiculopathy     with chronic pain    Ocular migraine     Sleep apnea     cpap     Past Surgical History:   Procedure Laterality Date    ADENOIDECTOMY      BACK SURGERY      x 12    CARDIAC CATHETERIZATION  2016    subtotalled LAD with right to left collaterals    CATARACT EXTRACTION W/  INTRAOCULAR LENS IMPLANT Left     Dr Coleman     CYSTOSCOPIC LITHOLAPAXY N/A 6/27/2019    Procedure: CYSTOLITHOLAPAXY;  Surgeon: Shireen Mayo MD;  Location: [facility] OR [address] [place] [address];  Service:  Urology;  Laterality: N/A;    CYSTOSCOPIC LITHOLAPAXY N/A 9/3/2019    Procedure: CYSTOLITHOLAPAXY;  Surgeon: Shireen Mayo MD;  Location: [facility] OR 2ND FLR;  Service: Urology;  Laterality: N/A;    ESOPHAGOGASTRODUODENOSCOPY N/A 5/23/2018    Procedure: ESOPHAGOGASTRODUODENOSCOPY (EGD);  Surgeon: Prince Vance MD;  Location: [facility] (4TH FLR);  Service: Endoscopy;  Laterality: N/A;  r/s 'd per Dr. Vance due to family emergency- ER    HYSTERECTOMY  1975    endometriosis    pain pump placement      SQ Dilaudid Pump managed by Dr. Hillman, Pain Management    REPLACEMENT OF CATHETER N/A 10/31/2019    Procedure: REPLACEMENT, CATHETER-SUPRAPUBIC;  Surgeon: Shireen Mayo MD;  Location: [facility] OR 1ST FLR;  Service: Urology;  Laterality: N/A;    SPINAL CORD STIMULATOR REMOVAL      before [place]    SPINE SURGERY  5-13-13    CERVICAL FUSION    TONSILLECTOMY       Social History     Socioeconomic History    Marital status:      Spouse name: Not on file    Number of children: Not on file    Years of education: Not on file    Highest education level: Not on file   Occupational History    Not on file   Social Needs    Financial resource strain: Not on file    Food insecurity:     Worry: Not on file     Inability: Not on file    Transportation needs:     Medical: Not on file     Non-medical: Not on file   Tobacco Use    Smoking status: Never Smoker    Smokeless tobacco: Never Used   Substance and Sexual Activity    Alcohol use: Never     Frequency: Never    Drug use: No    Sexual activity: Never   Lifestyle    Physical activity:     Days per week: Not on file     Minutes per session: Not on file    Stress: Not on file   Relationships    Social connections:     Talks on phone: Not on file     Gets together: Not on file     Attends Sabianism service: Not on file     Active member of club or organization: Not on file     Attends meetings of clubs or organizations: Not on file     Relationship  status: Not on file   Other Topics Concern    Are you pregnant or think you may be? Not Asked    Breast-feeding Not Asked   Social History Narrative    Not on file     Family History   Problem Relation Age of Onset    Cancer Mother 55        breast    Cancer Father         esophagus,had laryngectomy    Esophageal cancer Father     Parkinsonism Maternal Grandmother     Tremor Maternal Grandmother     No Known Problems Brother     No Known Problems Brother     Heart disease Maternal Uncle     Colon cancer Maternal Uncle         Less than 60    No Known Problems Sister     No Known Problems Maternal Aunt     No Known Problems Paternal Aunt     No Known Problems Paternal Uncle     No Known Problems Maternal Grandfather     No Known Problems Paternal Grandmother     No Known Problems Paternal Grandfather     Melanoma Neg Hx     Amblyopia Neg Hx     Blindness Neg Hx     Cataracts Neg Hx     Diabetes Neg Hx     Glaucoma Neg Hx     Hypertension Neg Hx     Macular degeneration Neg Hx     Retinal detachment Neg Hx     Strabismus Neg Hx     Stroke Neg Hx     Thyroid disease Neg Hx        Review of patient's allergies indicates:   Allergen Reactions    (d)-limonene flavor      Other reaction(s): difficult intubation  Other reaction(s): Difficulty breathing    Bactrim [sulfamethoxazole-trimethoprim] Anaphylaxis    Benadryl [diphenhydramine hcl] Shortness Of Breath    Imitrex [sumatriptan succinate] Shortness Of Breath    Topamax [topiramate] Shortness Of Breath    Vancomycin Shortness Of Breath     Rash    Butorphanol tartrate     Darvocet a500 [propoxyphene n-acetaminophen]      Other reaction(s): Difficulty breathing    Fentanyl      Other reaction(s): Vomiting  Other reaction(s): Nausea  Other reaction(s): Itching  swelling    White petrolatum-zinc     Zinc oxide-white petrolatum      Other reaction(s): Difficulty breathing    Latex, natural rubber Itching and Rash    Phenytoin Rash  and Other (See Comments)     Trouble breathing       Medication List with Changes/Refills   Current Medications    ASCORBIC ACID, VITAMIN C, (VITAMIN C) 500 MG TABLET    Take 500 mg by mouth once daily.    ASPIRIN (ECOTRIN) 81 MG EC TABLET    Take 1 tablet (81 mg total) by mouth once daily.    ATORVASTATIN (LIPITOR) 80 MG TABLET    TAKE 1 TABLET BY MOUTH DAILY    BUTALBITAL-ACETAMINOPHEN-CAFFEINE -40 MG (FIORICET, ESGIC) -40 MG PER TABLET    Take 1 tablet by mouth every 4 (four) hours as needed for Headaches.     FLUOXETINE 20 MG CAPSULE    Take 3 capsules (60 mg total) by mouth once daily.    FUROSEMIDE (LASIX) 40 MG TABLET    Take 2 tablets (80 mg total) by mouth every morning.    GABAPENTIN (NEURONTIN) 100 MG CAPSULE    Take 1 capsule (100 mg total) by mouth 3 (three) times daily.    HYDROCODONE-ACETAMINOPHEN (NORCO)  MG PER TABLET    Take 1 tablet by mouth every 6 (six) hours as needed for Pain.    INTRATHECAL PAIN PUMP COMPOUND    Hydromorphone (7.5 mg/mL) infusion at 3.6799 mg/day (0.1533 mg/hr) out of a total reservoir volume of 37.3 mL    LACTULOSE (CHRONULAC) 20 GRAM/30 ML SOLN    Take 45 mLs (30 g total) by mouth 3 (three) times daily as needed (constipation). Hold for loose or frequent stools    LEVOTHYROXINE (SYNTHROID) 125 MCG TABLET    Take 1 tablet (125 mcg total) by mouth before breakfast.    LIDOCAINE (LIDODERM) 5 %    Place 1 patch onto the skin daily as needed (pain). USE AS DIRECTED WEAR FOR A MAXIMUM OF 12 HOURS    OXYBUTYNIN (DITROPAN XL) 15 MG TR24    Take 15 mg by mouth once daily.    OXYCODONE-ACETAMINOPHEN (PERCOCET)  MG PER TABLET        PANTOPRAZOLE (PROTONIX) 40 MG TABLET    Take 1 tablet (40 mg total) by mouth once daily.    POLYETHYLENE GLYCOL (GLYCOLAX) 17 GRAM PWPK    Take 17 g by mouth once daily.    POTASSIUM CITRATE (UROCIT-K) 10 MEQ (1,080 MG) TBSR    Take 1 tablet (10 mEq total) by mouth once daily.    PROMETHAZINE (PHENERGAN) 25 MG TABLET    Take 25 mg  "by mouth every 6 (six) hours as needed for Nausea.    QUETIAPINE (SEROQUEL) 100 MG TAB    TAKE 1 TABLET (100 MG TOTAL) BY MOUTH EVERY EVENING.    QUETIAPINE (SEROQUEL) 25 MG TAB    Take 12.5 mg twice daily as needed for anxiety    SENNA (SENNA LAX) 8.6 MG TABLET    Take 2 tablets by mouth 2 (two) times daily.    TIZANIDINE (ZANAFLEX) 4 MG TABLET        TORSEMIDE (DEMADEX) 10 MG TAB    Take 1 tablet (10 mg total) by mouth once daily. for 7 days    TRAZODONE (DESYREL) 100 MG TABLET    Take 3 tablets (300 mg total) by mouth every evening.       Review of Systems  Constitution: Denies chills, fever, and sweats.  HENT: Denies headaches or blurry vision.  Cardiovascular: Denies chest pain or irregular heart beat.  Respiratory: Denies cough or shortness of breath.  Gastrointestinal: Denies abdominal pain, nausea, or vomiting.  Musculoskeletal: Denies muscle cramps.  Neurological: Denies dizziness or focal weakness.  Psychiatric/Behavioral: Normal mental status.  Hematologic/Lymphatic: Denies bleeding problem or easy bruising/bleeding.  Skin: Denies rash or suspicious lesions    Physical Examination  BP (!) 114/54 (BP Location: Right arm, Patient Position: Sitting, BP Method: Large (Automatic))   Pulse (!) 59   Ht 5' 5" (1.651 m)   Wt 86.2 kg (190 lb 0.6 oz)   LMP  (LMP Unknown)   BMI 31.62 kg/m²     Constitutional: No acute distress, conversant  HEENT: Sclera anicteric, Pupils equal, round and reactive to light, extraocular motions intact, Oropharynx clear  Neck: No JVD, no carotid bruits  Cardiovascular: regular rate and rhythm, no murmur, rubs or gallops, normal S1/S2  Pulmonary: Clear to auscultation bilaterally  Abdominal: Abdomen soft, nontender, nondistended, positive bowel sounds  Extremities:  2+ pitting BLE edema with changes of lymphedema.  Pt has severe tenderness to palpation of both legs with light touch.    Pulses:  Carotid pulses are 2+ on the right side, and 2+ on the left side.  Radial pulses are 2+ " on the right side, and 2+ on the left side.   Femoral pulses are 2+ on the right side, and 2+ on the left side.  Popliteal pulses are 2+ on the right side, and 2+ on the left side.   Dorsalis pedis pulses are 2+ on the right side, and 2+ on the left side.   Posterior tibial pulses are 2+ on the right side, and 2+ on the left side.    Skin: No ecchymosis, erythema, or ulcers  Psych: Alert and oriented x 3, appropriate affect  Neuro: CNII-XII intact, no focal deficits    Labs:  Most Recent Data  CBC:   Lab Results   Component Value Date    WBC 4.96 01/31/2020    HGB 9.5 (L) 01/31/2020    HCT 31.7 (L) 01/31/2020     01/31/2020    MCV 91 01/31/2020    RDW 13.6 01/31/2020     BMP:   Lab Results   Component Value Date     01/31/2020    K 3.9 01/31/2020     01/31/2020    CO2 29 01/31/2020    BUN 4 (L) 01/31/2020    CREATININE 0.8 01/31/2020     (H) 01/31/2020    CALCIUM 8.6 (L) 01/31/2020    MG 1.9 01/31/2020    PHOS 2.9 01/31/2020     LFTS;   Lab Results   Component Value Date    PROT 5.9 (L) 01/31/2020    ALBUMIN 3.2 (L) 01/31/2020    BILITOT 0.3 01/31/2020    AST 18 01/31/2020    ALKPHOS 89 01/31/2020    ALT 9 (L) 01/31/2020    GGT 51 04/30/2015     COAGS:   Lab Results   Component Value Date    INR 0.9 05/27/2014     FLP:   Lab Results   Component Value Date    CHOL 122 02/21/2019    HDL 47 02/21/2019    LDLCALC 63.8 02/21/2019    TRIG 56 02/21/2019    CHOLHDL 38.5 02/21/2019     CARDIAC:   Lab Results   Component Value Date    TROPONINI <0.006 01/28/2020    BNP 17 01/27/2020     BLE Venous Ultrasound 1/30/2020:  No evidence of deep venous thrombosis in either lower extremity.    Assessment/Plan:  Tasha Hawley is a 64 y.o. female with a past medical history of BLE lymphedema, CAD, lumbar radiculopathy, anxiety, hypothyroidism, CECI (on CPAP), neurogenic bladder with meadows at home (changed every 3 weeks), and chronic back pain since a work accident in 1997 (has had 12 back surgeries) on  "dilaudid pump, who presents for an initial appointment.    BLE Lymphedema with Severe TTP- Pt presents with BLE lymphedema with severe leg pain as described.  BLE edema/lymphedema is likely directly related to placement of intrathecal pain pump.  Recent worsening of the swelling may be due to recent increase in dilaudid dosage.  Accordingly, a study in the European Journal of Pain in 2000 (Eur J Pain. 2000;4(4):361-5) showed the following:    "Every patient who developed pedal and leg edema after the implantation of an infusion pump was also found to have leg edema and venous stasis prior to the time when the pump was inserted. This complication was severe enough to limit their physical activity, and to produce lymphedema, ulcerations and hyperpigmentation of the skin. Reduction of the edema occurred when the dose of the opiate was decreased, and in two cases in which the infusion was discontinued, there was almost complete resolution of the syndrome. It appears that the pre-existence of pedal edema and of venous stasis is a relative contraindication to the long-term intrathecal infusion of opiates in patients with chronic non-cancer pain."    Hence, I recommend decreasing her current dilaudid dosage.  I have discussed this in detail with Dr. Hillman (pain management) who is in agreement.  Check BLE venous reflux study and BLE arterial ultrasound.  Refer to lymphedema clinic.  Pt to elevate legs when resting. Continue torsemide as prescribed by PCP-Dr. Hodges (pt has close follow up with PCP).  Check BLE venous reflux study and BLE arterial ultrasound.  Given the severe TTP, will refer to Neurology for evaluation.      Follow up in 1 month    Total duration of face to face visit time 30 minutes.  Total time spent counseling greater than fifty percent of total visit time.  Counseling included discussion regarding imaging findings, diagnosis, possibilities, treatment options, risks and benefits.  The patient had many " questions regarding the options and long-term effects.    Kalpesh Haney MD, PhD  Interventional Cardiology

## 2020-02-06 NOTE — LETTER
February 6, 2020      Holden Armenta MD  [address]  [facility] [address]           [facility]  [address]  [facility] [address]  Phone: [phone]          Patient: Tasha Hawley   MR Number: [id number]   YOB: 1955   Date of Visit: 2/6/2020       Dear Dr. Holden Armenta:    Thank you for referring Tasha Hawley to me for evaluation. Attached you will find relevant portions of my assessment and plan of care.    If you have questions, please do not hesitate to call me. I look forward to following Tasha Hawley along with you.    Sincerely,    Kalpesh Haney MD PhD    Enclosure  CC:  No Recipients    If you would like to receive this communication electronically, please contact [email or web address] or [phone] to request more information on [facility] access.    For providers and/or their staff who would like to refer a patient to [facility], please contact us through our one-stop-shop provider referral line, [facility], at [phone].    If you feel you have received this communication in error or would no longer like to receive these types of communications, please e-mail [email or web address]

## 2020-02-06 NOTE — PATIENT INSTRUCTIONS
"Assessment/Plan:  Tasha Hawley is a 64 y.o. female with a past medical history of BLE lymphedema, CAD, lumbar radiculopathy, anxiety, hypothyroidism, CECI (on CPAP), neurogenic bladder with meadows at home (changed every 3 weeks), and chronic back pain since a work accident in 1997 (has had 12 back surgeries) on dilaudid pump, who presents for an initial appointment.    BLE Lymphedema with Severe TTP- Pt presents with BLE lymphedema with severe leg pain as described.  BLE edema/lymphedema is likely directly related to placement of intrathecal pain pump.  Recent worsening of the swelling may be due to recent increase in dilaudid dosage.  Accordingly, a study in the European Journal of Pain in 2000 (Eur J Pain. 2000;4(4):361-5) showed the following:    "Every patient who developed pedal and leg edema after the implantation of an infusion pump was also found to have leg edema and venous stasis prior to the time when the pump was inserted. This complication was severe enough to limit their physical activity, and to produce lymphedema, ulcerations and hyperpigmentation of the skin. Reduction of the edema occurred when the dose of the opiate was decreased, and in two cases in which the infusion was discontinued, there was almost complete resolution of the syndrome. It appears that the pre-existence of pedal edema and of venous stasis is a relative contraindication to the long-term intrathecal infusion of opiates in patients with chronic non-cancer pain."    Hence, I recommend decreasing her current dilaudid dosage.  I have discussed this in detail with Dr. Hillman (pain management) who is in agreement.  Check BLE venous reflux study and BLE arterial ultrasound.  Refer to lymphedema clinic.  Pt to elevate legs when resting. Continue torsemide as prescribed by PCP-Dr. Hodges (pt has close follow up with PCP).  Check BLE venous reflux study and BLE arterial ultrasound.  Given the severe TTP, will refer to Neurology for " evaluation.      Follow up in 1 month

## 2020-02-06 NOTE — PROGRESS NOTES
Subjective:       Patient ID: Tasha Hawley is a 64 y.o. female.    Chief Complaint: Hospital Follow Up    HPI - Still with abdominal and leg pains.  She was admitted 1/27-2/1 for LLQ pain thought d/t constipation and pain/swelling in bilateral legs.  She notes that this started a few days after her pain management physician increased her dilaudid pump infusion rate.  She is now having loose stools with the current treatments for constipation, but she has f/u with GI for endoscopy and workup.  She is thinking about going to pain management to get the infusion rate decreased again.  She is not a smoker.    PMH:  Chronic insomnia  Chronic low back pain with narcotic use.  Indwelling narcotic pump  History CVA with dysarthria  Neurogenic bladder, with recurrent UTI's. Followed by urogyn (Milena).  Suprapubic catheter  Hypothyroidism, stable  CECI, not on CPAP  CAD, with ACS in Jan 2016 - subtot mid LAD lesion without PCI; ischemic CM with EF 40% and chronic LE edema. Followed by [facility]  Anxiety and Depression  Chronic constipation, likely d/t ongoing narcotic use  Intermittent nausea  LE dependent edema     Meds: Reviewed and reconciled in EPIC with patient during visit today.    Review of Systems   Constitutional: Negative for fever.   HENT: Negative for congestion.    Respiratory: Negative for shortness of breath.    Cardiovascular: Positive for leg swelling. Negative for chest pain.   Gastrointestinal: Positive for abdominal pain.   Genitourinary: Negative for difficulty urinating.   Musculoskeletal: Positive for gait problem.   Skin: Negative for rash.   Neurological: Negative for headaches.   Psychiatric/Behavioral: Positive for sleep disturbance.       Objective:      Physical Exam   Constitutional: She is oriented to person, place, and time. She appears well-developed and well-nourished.   Frail woman who appears much older than stated age.  Examined in wheelchair d/t debility     HENT:   Head:  Normocephalic and atraumatic.   Cardiovascular: Normal rate, regular rhythm and normal heart sounds. Exam reveals no gallop and no friction rub.   No murmur heard.  Pulmonary/Chest: Effort normal. No stridor. No respiratory distress. She has no wheezes. She has rales (bibasilar). She exhibits no tenderness.   Musculoskeletal: She exhibits edema (4+, tender).   Neurological: She is alert and oriented to person, place, and time.   Skin: Skin is warm and dry. No erythema.   Psychiatric: She has a normal mood and affect.   Nursing note and vitals reviewed.      Assessment:       1. Abdominal pain, unspecified abdominal location    2. Recurrent major depression in partial remission    3. Pain in both lower extremities    4. Inflammatory spondylopathy of lumbar region    5. Cervical myelopathy    6. Bilateral leg edema    7. Cardiomyopathy, unspecified type    8. Thoracic aorta atherosclerosis        Plan:       Tasha was seen today for hospital follow up.    Diagnoses and all orders for this visit:    Abdominal pain, unspecified abdominal location - unclear etiology.  Still favor ibs-variant.  Defer to GI for now    Recurrent major depression in partial remission    Pain in both lower extremities - with edema.  Would probably do better with lower infusion rate    Inflammatory spondylopathy of lumbar region    Cervical myelopathy    Bilateral leg edema - pretty markedly different today, with rales in lungs.  Will add torsemide for a week to see if we can get 10 lbs of fluid off  -     torsemide (DEMADEX) 10 MG Tab; Take 1 tablet (10 mg total) by mouth once daily. for 7 days    Cardiomyopathy, unspecified type    Thoracic aorta atherosclerosis -  Stable.  Noted in chart    rtc one week    G Sriram Hodges MD MPH  Staff Internist

## 2020-02-07 DIAGNOSIS — Z12.11 SPECIAL SCREENING FOR MALIGNANT NEOPLASMS, COLON: Primary | ICD-10-CM

## 2020-02-07 RX ORDER — POLYETHYLENE GLYCOL 3350, SODIUM SULFATE ANHYDROUS, SODIUM BICARBONATE, SODIUM CHLORIDE, POTASSIUM CHLORIDE 236; 22.74; 6.74; 5.86; 2.97 G/4L; G/4L; G/4L; G/4L; G/4L
4 POWDER, FOR SOLUTION ORAL ONCE
Qty: 4000 ML | Refills: 0 | Status: SHIPPED | OUTPATIENT
Start: 2020-02-07 | End: 2020-02-07

## 2020-02-07 NOTE — TELEPHONE ENCOUNTER
Holden Armenta MD  You; Jerri Plascencia MD; Fay Powers MD 54 minutes ago (7:30 AM)      Patient with revised cardiac risk index of 2 with risk of MACE at 30 days of 10.1%. Proceed with colonoscopy and EGD at above risk.No further cardiac workup needed prior to EGD and colonoscopy    Routing comment        MD Holden Johns MD; Jerri Plascencia MD 2 days ago      Looks like you guys just saw her.     Thanks.    Routing comment

## 2020-02-14 ENCOUNTER — OFFICE VISIT (OUTPATIENT)
Dept: INTERNAL MEDICINE | Facility: CLINIC | Age: 65
End: 2020-02-14
Payer: MEDICARE

## 2020-02-14 VITALS
SYSTOLIC BLOOD PRESSURE: 118 MMHG | OXYGEN SATURATION: 98 % | BODY MASS INDEX: 30.35 KG/M2 | DIASTOLIC BLOOD PRESSURE: 60 MMHG | HEIGHT: 65 IN | HEART RATE: 48 BPM | WEIGHT: 182.19 LBS

## 2020-02-14 DIAGNOSIS — F11.20 NARCOTIC DEPENDENCY, CONTINUOUS: Chronic | ICD-10-CM

## 2020-02-14 DIAGNOSIS — G89.4 CHRONIC PAIN SYNDROME: Chronic | ICD-10-CM

## 2020-02-14 DIAGNOSIS — I89.0 LYMPHEDEMA OF BOTH LOWER EXTREMITIES: Primary | Chronic | ICD-10-CM

## 2020-02-14 DIAGNOSIS — Z93.59 SUPRAPUBIC CATHETER: ICD-10-CM

## 2020-02-14 DIAGNOSIS — R33.9 URINARY RETENTION: Chronic | ICD-10-CM

## 2020-02-14 PROCEDURE — 99999 PR PBB SHADOW E&M-EST. PATIENT-LVL III: CPT | Mod: PBBFAC,HCNC,, | Performed by: INTERNAL MEDICINE

## 2020-02-14 PROCEDURE — 99999 PR PBB SHADOW E&M-EST. PATIENT-LVL III: ICD-10-PCS | Mod: PBBFAC,HCNC,, | Performed by: INTERNAL MEDICINE

## 2020-02-14 PROCEDURE — 99214 PR OFFICE/OUTPT VISIT, EST, LEVL IV, 30-39 MIN: ICD-10-PCS | Mod: HCNC,S$GLB,, | Performed by: INTERNAL MEDICINE

## 2020-02-14 PROCEDURE — 99499 UNLISTED E&M SERVICE: CPT | Mod: S$GLB,,, | Performed by: INTERNAL MEDICINE

## 2020-02-14 PROCEDURE — 99499 RISK ADDL DX/OHS AUDIT: ICD-10-PCS | Mod: S$GLB,,, | Performed by: INTERNAL MEDICINE

## 2020-02-14 PROCEDURE — 99214 OFFICE O/P EST MOD 30 MIN: CPT | Mod: HCNC,S$GLB,, | Performed by: INTERNAL MEDICINE

## 2020-02-14 PROCEDURE — 3008F PR BODY MASS INDEX (BMI) DOCUMENTED: ICD-10-PCS | Mod: HCNC,CPTII,S$GLB, | Performed by: INTERNAL MEDICINE

## 2020-02-14 PROCEDURE — 3008F BODY MASS INDEX DOCD: CPT | Mod: HCNC,CPTII,S$GLB, | Performed by: INTERNAL MEDICINE

## 2020-02-14 NOTE — PROGRESS NOTES
Subjective:       Patient ID: Tasha Hawley is a 64 y.o. female.    Chief Complaint: Follow-up    HPI - since our last visit a week ago, her dilaudid infusion rate has been reduced, and her abdominal discomfort/LE swelling has greatly improved.  She had trouble with her suprapubic catheter this week, but home health managed to clear it for her.  She's otherwise doing OK.  Not a smoker.  Asked what I thought about her flying to [place] to go to a wedding.  Agreed that she can go do this, as long as she continues her medications and therapies    PMH:  Chronic insomnia  Chronic low back pain with narcotic use.  Indwelling narcotic pump  History CVA with dysarthria  Neurogenic bladder, with recurrent UTI's. Followed by urogyn (Milena).  Suprapubic catheter  Hypothyroidism, stable  CECI, not on CPAP  CAD, with ACS in Jan 2016 - subtot mid LAD lesion without PCI; ischemic CM with EF 40% and chronic LE edema. Followed by [facility]  Anxiety and Depression  Chronic constipation, likely d/t ongoing narcotic use  Intermittent nausea  LE dependent edema     Meds: Reviewed and reconciled in EPIC with patient during visit today.    Review of Systems   Constitutional: Negative for fever.   HENT: Negative for congestion.    Respiratory: Negative for shortness of breath.    Cardiovascular: Positive for leg swelling (improved).   Gastrointestinal: Negative for abdominal pain.   Genitourinary: Positive for difficulty urinating.   Musculoskeletal: Positive for arthralgias and back pain.   Skin: Negative for rash.   Neurological: Negative for headaches.   Psychiatric/Behavioral: Positive for dysphoric mood.       Objective:      Physical Exam   Constitutional: She appears well-developed and well-nourished. No distress.   HENT:   Head: Normocephalic and atraumatic.   Musculoskeletal: She exhibits edema.   Neurological: She is alert.   Skin: She is not diaphoretic.   Psychiatric: She has a normal mood and affect.   Nursing  note and vitals reviewed.      Assessment:       1. Lymphedema of both lower extremities    2. Urinary retention    3. Suprapubic catheter    4. Chronic pain syndrome    5. Narcotic dependency, continuous        Plan:       Tasha was seen today for follow-up.    Diagnoses and all orders for this visit:    Lymphedema of both lower extremities - this is greatly improved since last visit, and she has lost 8 lbs.  May go back to lasix.  Recommended she not increase the dilaudid pump but find different ways to treat her pain in the future    Urinary retention    Suprapubic catheter - this is working now.  Stay the course; f/u with urogyn    Chronic pain syndrome - stable, not in pain today    Narcotic dependency, continuous    rtc prn, or in 3 months    G Sriram Hodges MD MPH  Staff Internist

## 2020-02-17 ENCOUNTER — TELEPHONE (OUTPATIENT)
Dept: INTERNAL MEDICINE | Facility: CLINIC | Age: 65
End: 2020-02-17

## 2020-02-17 NOTE — TELEPHONE ENCOUNTER
Osei BHAT has been advised to give extra lasix 40 mg once if pt san 3 lbs in one week. Spoke with Liz dos santos

## 2020-02-17 NOTE — TELEPHONE ENCOUNTER
----- Message from Eris Astorga sent at 2/17/2020 12:51 PM CST -----  Contact: [facility]/ Liz [phone]  The patient had a 5lb weight gain in one week therefore, she wants to know the they can increase the dose of her fluid pill.    Thank you

## 2020-02-17 NOTE — TELEPHONE ENCOUNTER
Called Liz and she stated that patient gained 5 lbs over the weekend. Still taking 40 mg lasix 2 tabs daily. Legs more swollen.  Needs orders to adjust lasix as necessary whenever pt gains weight.    Thanks .    Carrie

## 2020-02-17 NOTE — TELEPHONE ENCOUNTER
Call the patient at home.  She should take an extra lasix (40 mg) if she gains more than 3 lbs in a week.      Thanks.

## 2020-02-19 ENCOUNTER — EXTERNAL HOME HEALTH (OUTPATIENT)
Dept: HOME HEALTH SERVICES | Facility: HOSPITAL | Age: 65
End: 2020-02-19
Payer: MEDICARE

## 2020-02-20 ENCOUNTER — TELEPHONE (OUTPATIENT)
Dept: UROLOGY | Facility: CLINIC | Age: 65
End: 2020-02-20

## 2020-02-20 DIAGNOSIS — N30.90 BLADDER INFECTION: Primary | ICD-10-CM

## 2020-02-20 RX ORDER — AMOXICILLIN AND CLAVULANATE POTASSIUM 500; 125 MG/1; MG/1
1 TABLET, FILM COATED ORAL 3 TIMES DAILY
Qty: 21 TABLET | Refills: 0 | Status: SHIPPED | OUTPATIENT
Start: 2020-02-20 | End: 2020-02-27

## 2020-02-20 NOTE — TELEPHONE ENCOUNTER
----- Message from Sis Calixto sent at 2/20/2020  8:09 AM CST -----  Contact: pt: [phone]  Pt called to speak with nurse, state she has blood in her urine and bladder pressure would like to be put on a antibiotic before the weekend will be leaving to go out of town      Please contact pt: [phone]

## 2020-02-20 NOTE — TELEPHONE ENCOUNTER
Checked the previous positive urine culture.  It was sensitive to Augmentin.  Called the patient and confirmed that she wanted it sent to [facility] pharmacy in [place].

## 2020-02-27 ENCOUNTER — TELEPHONE (OUTPATIENT)
Dept: UROLOGY | Facility: CLINIC | Age: 65
End: 2020-02-27

## 2020-02-27 RX ORDER — NITROFURANTOIN 25; 75 MG/1; MG/1
100 CAPSULE ORAL 2 TIMES DAILY
Qty: 14 CAPSULE | Refills: 0 | Status: SHIPPED | OUTPATIENT
Start: 2020-02-27 | End: 2020-03-05

## 2020-02-27 NOTE — TELEPHONE ENCOUNTER
Patient called stating she is still symptomatic.  Augmentin prescribed 2/20/20.  She completes abx treatment tomorrow morning.  She reports bladder pressure, urethra burning, pain in her stomach and left lower back and passing blood.   Urine culture results received.   Urine culture positive for S. Aureus (>100cfu/ml) (sensitive: ampicillin, cipro, daptomycin, levofloxacin, nitrofurantoin,vancomycin)  E. Faecalis (50K-100Kcfu/ml) (sensitive:gentamicin, nitrofurantoin, oxacillin, tetracycline,bactrim, vancomycin)    Macrobid sent to pharmacy as it is sensitive to both bacteria.    Stop augmentin and start macrobid.    Dr. Mayo currently out of the office.  I will notify her of change.

## 2020-03-04 ENCOUNTER — TELEPHONE (OUTPATIENT)
Dept: INTERNAL MEDICINE | Facility: CLINIC | Age: 65
End: 2020-03-04

## 2020-03-04 NOTE — TELEPHONE ENCOUNTER
Please call her back.  This is cyclical with her.  She has trazodone at home, and she should take that at bedtime.  Remind her of the relaxation techniques we have discussed and that these will help, also.    Thanks.

## 2020-03-04 NOTE — TELEPHONE ENCOUNTER
Requesting medication to help her sleep   Over the last 2 weeks she sleeps between 35-45 minutes wakes up and is unable to go back to sleep   Please advise

## 2020-03-04 NOTE — TELEPHONE ENCOUNTER
States that she is taken that the trazodone nightly   And it is not helping   Did not recall discussing relaxation techniques   resend her message

## 2020-03-04 NOTE — TELEPHONE ENCOUNTER
----- Message from Maria Esther Winter sent at 3/4/2020  1:23 PM CST -----  Contact: Self  [phone]  Patient is having issues going to sleep and wanted to speak with doctor about this.      [facility]    [phone] (Phone)  [phone] (Fax)

## 2020-03-04 NOTE — TELEPHONE ENCOUNTER
----- Message from Jeaneth Castillo sent at 3/4/2020  8:56 AM CST -----  Contact: Patient [phone]  Patient is requesting a call about a personal matter.    Please call and advise.    Thank You

## 2020-03-05 ENCOUNTER — TELEPHONE (OUTPATIENT)
Dept: UROLOGY | Facility: CLINIC | Age: 65
End: 2020-03-05

## 2020-03-05 ENCOUNTER — TELEPHONE (OUTPATIENT)
Dept: CARDIOLOGY | Facility: CLINIC | Age: 65
End: 2020-03-05

## 2020-03-05 DIAGNOSIS — N39.0 RECURRENT UTI: Primary | ICD-10-CM

## 2020-03-05 NOTE — TELEPHONE ENCOUNTER
----- Message from Bryanna Crowder sent at 3/5/2020  9:30 AM CST -----  Contact: Saray mike/Shai [facility] [phone]  Calling in regards to antibiotics being completed but is still having all the symptoms of UTI(pelvic pain,blood in urine, and sediment in urine). Catheter has been changed and specimen has been collected. Needs orders put in Epic so that specimen can be dropped off at an [facility] facility. Please call

## 2020-03-05 NOTE — TELEPHONE ENCOUNTER
Notified Saray that order for UCx was done. States pt c/o  having no relief from 2 different abx (Augmentin and Macrobid).

## 2020-03-05 NOTE — TELEPHONE ENCOUNTER
Saray nurse with [facility] is reporting patient weight gain/fluid retention. Weight up 10lb in last two weeks. Spoke with PCP and advised could take additional Lasix 40mg in PM in addition to scheduled 80mg in am qd. Currently has been taking 120mg daily for last week and has not had response. Weight has stayed around 188.8lb. Weighed 178lb on 2/22. Denies increased shortness of breath. Message routed to Dr. Armenta for review. Appt made for tomorrow with TOM Munoz NP in interim as patient also has venous u/s scheduled for tomorrow afternoon.

## 2020-03-09 ENCOUNTER — TELEPHONE (OUTPATIENT)
Dept: UROLOGY | Facility: CLINIC | Age: 65
End: 2020-03-09

## 2020-03-09 ENCOUNTER — TELEPHONE (OUTPATIENT)
Dept: UROLOGY | Facility: HOSPITAL | Age: 65
End: 2020-03-09

## 2020-03-09 DIAGNOSIS — N30.90 BLADDER INFECTION: Primary | ICD-10-CM

## 2020-03-09 RX ORDER — DOXYCYCLINE 100 MG/1
100 CAPSULE ORAL 2 TIMES DAILY
Qty: 14 CAPSULE | Refills: 0 | Status: SHIPPED | OUTPATIENT
Start: 2020-03-09 | End: 2020-03-16

## 2020-03-09 NOTE — TELEPHONE ENCOUNTER
Patient had MSSA, doxycycline was sent to preferred pharmacy as she is allergic to bactrim.  She had the catheter replaced at the time of sampling of the urine.

## 2020-03-09 NOTE — TELEPHONE ENCOUNTER
----- Message from Shireen Mayo MD sent at 3/9/2020  8:48 AM CDT -----  Please let her know that doxycycline was sent to [facility].  Could you please ask if the catheter was replaced when the new sample was obtained?  If not, it needs to be done this week.  Thanks.

## 2020-03-10 ENCOUNTER — CLINICAL SUPPORT (OUTPATIENT)
Dept: CARDIOLOGY | Facility: CLINIC | Age: 65
End: 2020-03-10
Attending: INTERNAL MEDICINE
Payer: MEDICARE

## 2020-03-10 DIAGNOSIS — R60.9 EDEMA, UNSPECIFIED TYPE: ICD-10-CM

## 2020-03-10 DIAGNOSIS — Z96.89 S/P INSERTION OF SPINAL CORD STIMULATOR: ICD-10-CM

## 2020-03-10 DIAGNOSIS — M79.662 PAIN IN BOTH LOWER LEGS: ICD-10-CM

## 2020-03-10 DIAGNOSIS — M51.36 DEGENERATIVE DISC DISEASE, LUMBAR: ICD-10-CM

## 2020-03-10 DIAGNOSIS — G89.4 CHRONIC PAIN SYNDROME: ICD-10-CM

## 2020-03-10 DIAGNOSIS — I89.0 LYMPHEDEMA OF BOTH LOWER EXTREMITIES: ICD-10-CM

## 2020-03-10 DIAGNOSIS — M79.661 PAIN IN BOTH LOWER LEGS: ICD-10-CM

## 2020-03-10 LAB
LEFT ANT TIBIAL SYS PSV: 35 CM/S
LEFT CFA PSV: 202 CM/S
LEFT EXTERNAL ILIAC PSV: 132 CM/S
LEFT PERONEAL SYS PSV: 65 CM/S
LEFT POPLITEAL PSV: 47 CM/S
LEFT POST TIBIAL SYS PSV: 62 CM/S
LEFT PROFUNDA SYS PSV: 123 CM/S
LEFT SUPER FEMORAL DIST SYS PSV: 53 CM/S
LEFT SUPER FEMORAL MID SYS PSV: 91 CM/S
LEFT SUPER FEMORAL OSTIAL SYS PSV: 136 CM/S
LEFT SUPER FEMORAL PROX SYS PSV: 159 CM/S
LEFT TIB/PER TRUNK SYS PSV: 78 CM/S
OHS CV LEFT LOWER EXTREMITY ABI (NO CALC): 0.98
OHS CV RIGHT ABI LOWER EXTREMITY (NO CALC): 0.96
RIGHT ANT TIBIAL SYS PSV: 36 CM/S
RIGHT CFA PSV: 101 CM/S
RIGHT EXTERNAL ILLIAC PSV: 104 CM/S
RIGHT PERONEAL SYS PSV: 48 CM/S
RIGHT POPLITEAL PSV: 56 CM/S
RIGHT POST TIBIAL SYS PSV: 66 CM/S
RIGHT PROFUNDA SYS PSV: 72 CM/S
RIGHT SUPER FEMORAL DIST SYS PSV: 77 CM/S
RIGHT SUPER FEMORAL MID SYS PSV: 119 CM/S
RIGHT SUPER FEMORAL OSTIAL SYS PSV: 82 CM/S
RIGHT SUPER FEMORAL PROX SYS PSV: 124 CM/S
RIGHT TIB/PER TRUNK SYS PSV: 82 CM/S

## 2020-03-10 PROCEDURE — 93925 CV US DOPPLER ARTERIAL LEGS BILATERAL (CUPID ONLY): ICD-10-PCS | Mod: HCNC,S$GLB,, | Performed by: INTERNAL MEDICINE

## 2020-03-10 PROCEDURE — 93925 LOWER EXTREMITY STUDY: CPT | Mod: HCNC,S$GLB,, | Performed by: INTERNAL MEDICINE

## 2020-03-10 PROCEDURE — 99999 PR PBB SHADOW E&M-EST. PATIENT-LVL I: ICD-10-PCS | Mod: PBBFAC,HCNC,,

## 2020-03-10 PROCEDURE — 99999 PR PBB SHADOW E&M-EST. PATIENT-LVL I: CPT | Mod: PBBFAC,HCNC,,

## 2020-03-10 RX ORDER — FUROSEMIDE 40 MG/1
120 TABLET ORAL DAILY
Qty: 270 TABLET | Refills: 3 | Status: ON HOLD
Start: 2020-03-10 | End: 2020-05-17 | Stop reason: HOSPADM

## 2020-03-10 NOTE — TELEPHONE ENCOUNTER
----- Message from Christina Pillai sent at 3/10/2020  9:59 AM CDT -----  Contact: E[facility] [phone]  Type: Home Health (orders, updates, clarifications, etc.)    Home Health Agency/Nurse: Brittany So    Phone number:  [phone]    Reason for call: New Rx , reflecting increased dosage  needed for Lassix.  Patient was instructed to take 80 mg in the morning and 40 in the evening by cardiologist    [facility] [place] [address] [phone] (Phone)  [phone] (Fax)

## 2020-03-11 ENCOUNTER — TELEPHONE (OUTPATIENT)
Dept: INTERNAL MEDICINE | Facility: CLINIC | Age: 65
End: 2020-03-11

## 2020-03-11 ENCOUNTER — NURSE TRIAGE (OUTPATIENT)
Dept: ADMINISTRATIVE | Facility: CLINIC | Age: 65
End: 2020-03-11

## 2020-03-11 ENCOUNTER — OFFICE VISIT (OUTPATIENT)
Dept: UROLOGY | Facility: CLINIC | Age: 65
End: 2020-03-11
Payer: MEDICARE

## 2020-03-11 VITALS — HEART RATE: 63 BPM | DIASTOLIC BLOOD PRESSURE: 66 MMHG | SYSTOLIC BLOOD PRESSURE: 104 MMHG

## 2020-03-11 DIAGNOSIS — Z93.59 SUPRAPUBIC CATHETER: Chronic | ICD-10-CM

## 2020-03-11 DIAGNOSIS — N31.9 NEUROGENIC BLADDER: Primary | Chronic | ICD-10-CM

## 2020-03-11 PROCEDURE — 99213 OFFICE O/P EST LOW 20 MIN: CPT | Mod: HCNC,S$GLB,, | Performed by: UROLOGY

## 2020-03-11 PROCEDURE — 99213 PR OFFICE/OUTPT VISIT, EST, LEVL III, 20-29 MIN: ICD-10-PCS | Mod: HCNC,S$GLB,, | Performed by: UROLOGY

## 2020-03-11 PROCEDURE — 99999 PR PBB SHADOW E&M-EST. PATIENT-LVL IV: ICD-10-PCS | Mod: PBBFAC,HCNC,, | Performed by: UROLOGY

## 2020-03-11 PROCEDURE — 99999 PR PBB SHADOW E&M-EST. PATIENT-LVL IV: CPT | Mod: PBBFAC,HCNC,, | Performed by: UROLOGY

## 2020-03-11 RX ORDER — POTASSIUM CHLORIDE 750 MG/1
TABLET, EXTENDED RELEASE ORAL
Status: ON HOLD | COMMUNITY
Start: 2020-02-28 | End: 2020-05-17 | Stop reason: HOSPADM

## 2020-03-11 NOTE — TELEPHONE ENCOUNTER
----- Message from Fabien Ulloa sent at 3/11/2020  3:09 PM CDT -----  Contact: [facility] [phone]  Patient  will like to speak to the nurse in regards to furosemide (LASIX) 40 MG tablet,pt  states she take 80 mg in the morning and 40 at night, please advise.

## 2020-03-11 NOTE — TELEPHONE ENCOUNTER
Pt called in to request refill of lasix, RX order found in patient chart, but not e scribe to pharmacy.pt states she has six pills left.  Pt advise per protocol and verbalized understanding.    Reason for Disposition   Caller requesting a NON-URGENT new prescription or refill and triager unable to refill per unit policy    Protocols used: MEDICATION QUESTION CALL-A-

## 2020-03-12 ENCOUNTER — OFFICE VISIT (OUTPATIENT)
Dept: CARDIOLOGY | Facility: CLINIC | Age: 65
End: 2020-03-12
Payer: MEDICARE

## 2020-03-12 ENCOUNTER — TELEPHONE (OUTPATIENT)
Dept: NEUROLOGY | Facility: CLINIC | Age: 65
End: 2020-03-12

## 2020-03-12 ENCOUNTER — TELEPHONE (OUTPATIENT)
Dept: INTERNAL MEDICINE | Facility: CLINIC | Age: 65
End: 2020-03-12

## 2020-03-12 VITALS
DIASTOLIC BLOOD PRESSURE: 76 MMHG | HEART RATE: 60 BPM | HEIGHT: 65 IN | WEIGHT: 182 LBS | BODY MASS INDEX: 30.32 KG/M2 | SYSTOLIC BLOOD PRESSURE: 132 MMHG

## 2020-03-12 DIAGNOSIS — M79.661 PAIN IN BOTH LOWER LEGS: ICD-10-CM

## 2020-03-12 DIAGNOSIS — M79.662 PAIN IN BOTH LOWER LEGS: ICD-10-CM

## 2020-03-12 DIAGNOSIS — Z96.89 S/P INSERTION OF SPINAL CORD STIMULATOR: Chronic | ICD-10-CM

## 2020-03-12 DIAGNOSIS — I89.0 LYMPHEDEMA OF BOTH LOWER EXTREMITIES: Primary | Chronic | ICD-10-CM

## 2020-03-12 DIAGNOSIS — Z98.890 S/P INSERTION OF INTRATHECAL PUMP: Chronic | ICD-10-CM

## 2020-03-12 DIAGNOSIS — G89.4 CHRONIC PAIN SYNDROME: Chronic | ICD-10-CM

## 2020-03-12 PROCEDURE — 99999 PR PBB SHADOW E&M-EST. PATIENT-LVL V: CPT | Mod: PBBFAC,HCNC,, | Performed by: INTERNAL MEDICINE

## 2020-03-12 PROCEDURE — 99215 PR OFFICE/OUTPT VISIT, EST, LEVL V, 40-54 MIN: ICD-10-PCS | Mod: HCNC,S$GLB,, | Performed by: INTERNAL MEDICINE

## 2020-03-12 PROCEDURE — 3008F PR BODY MASS INDEX (BMI) DOCUMENTED: ICD-10-PCS | Mod: HCNC,CPTII,S$GLB, | Performed by: INTERNAL MEDICINE

## 2020-03-12 PROCEDURE — 3008F BODY MASS INDEX DOCD: CPT | Mod: HCNC,CPTII,S$GLB, | Performed by: INTERNAL MEDICINE

## 2020-03-12 PROCEDURE — 99215 OFFICE O/P EST HI 40 MIN: CPT | Mod: HCNC,S$GLB,, | Performed by: INTERNAL MEDICINE

## 2020-03-12 PROCEDURE — 99999 PR PBB SHADOW E&M-EST. PATIENT-LVL V: ICD-10-PCS | Mod: PBBFAC,HCNC,, | Performed by: INTERNAL MEDICINE

## 2020-03-12 NOTE — PATIENT INSTRUCTIONS
Assessment/Plan:  Tasha Hawley is a 64 y.o. female with a past medical history of BLE lymphedema, CAD, lumbar radiculopathy, anxiety, hypothyroidism, CECI (on CPAP), neurogenic bladder with meadows at home (changed every 3 weeks), and chronic back pain since a work accident in 1997 (has had 12 back surgeries) on dilaudid pump, who presents for a follow up appointment.    1. BLE Lymphedema with Severe TTP- Mrs. Hawley reports BLE edema has improved over the past week.  Continues to have significant pain in both legs.  Check BLE venous reflux study.  Refer to lymphedema clinic.  Pt to elevate legs when resting. Continue torsemide as prescribed by PCP-Dr. Hodges (pt has close follow up with PCP).   Pt is awaiting Neuro evaluation for leg pain.      Follow up in 2 months

## 2020-03-12 NOTE — TELEPHONE ENCOUNTER
----- Message from Juanita Stevenson sent at 3/12/2020  3:08 PM CDT -----  Contact: pt @ [phone] or   Caller is asking to have the patient seen sooner than 5/19 for dx: Chronic pain syndrome [G89.4]  Degenerative disc disease, lumbar [M51.36]  S/P insertion of spinal cord stimulator [Z98.890]  Pain in both lower legs [M79.661, M79.662]  Lymphedema of both lower extremities [I89.0]    Patient is willing to see any provider that can treat her. Please call.

## 2020-03-12 NOTE — TELEPHONE ENCOUNTER
Called pharmacy and spoke with Sammi and gave a verbal on medication lasix 40 mg 2 in the morning and 1 in the evening #270 with 3 refills.     Pharmacy never received rx initially sent on the 03/10th via [employer].    raya

## 2020-03-12 NOTE — TELEPHONE ENCOUNTER
----- Message from Argelia Triplett sent at 3/12/2020  9:48 AM CDT -----  Contact: Dangelo() [phone]  Pt  is calling about the new Rx for the patient's furosemide (LASIX) 40 MG tablet. Pt  states the [facility], [place] - [address] [phone] (Phone)  [phone] (Fax) has not received the Rx. Please call and advise.

## 2020-03-12 NOTE — PROGRESS NOTES
"[facility]    CC: Lymphedema    Patient ID: Tasha Hawley is a 64 y.o. female with a past medical history of BLE lymphedema, CAD, lumbar radiculopathy, anxiety, hypothyroidism, CECI (on CPAP), neurogenic bladder with meadows at home (changed every 3 weeks), and chronic back pain since a work accident in 1997 (has had 12 back surgeries) on dilaudid pump, who presents for a follow up appointment.  Pertinent history/events are as follows:     -Pt kindly referred by Dr. Armenta for evaluation of BLE lymphedema.      -At our initial clinic visit on 2/6/2020, Mrs. Hawley reported have BLE edema for several years.  States BLE edema became significantly worse after intrathecal pain pump was placed.  Pt states her dilaudid dosage was recently increased, which resulted in worsening of the BLE edema.  She reports constant pain in both legs.  No tissue loss.  No smoking history.  Exam shows 2+ pitting BLE edema with changes of lymphedema.  Pt has severe tenderness to palpation of both legs with light touch.  BLE Venous Ultrasound on 1/30/2020 showed no evidence of deep venous thrombosis in either lower extremity.  Plan:   BLE Lymphedema with Severe TTP- Pt presents with BLE lymphedema with severe leg pain as described.  BLE edema/lymphedema is likely directly related to placement of intrathecal pain pump.  Recent worsening of the swelling may be due to recent increase in dilaudid dosage.  Accordingly, a study in the European Journal of Pain in 2000 (Eur J Pain. 2000;4(4):361-5) showed the following:    "Every patient who developed pedal and leg edema after the implantation of an infusion pump was also found to have leg edema and venous stasis prior to the time when the pump was inserted. This complication was severe enough to limit their physical activity, and to produce lymphedema, ulcerations and hyperpigmentation of the skin. Reduction of the edema occurred when the dose of the opiate was decreased, and in " "two cases in which the infusion was discontinued, there was almost complete resolution of the syndrome. It appears that the pre-existence of pedal edema and of venous stasis is a relative contraindication to the long-term intrathecal infusion of opiates in patients with chronic non-cancer pain."    Hence, I recommend decreasing her current dilaudid dosage.  I have discussed this in detail with Dr. Hillman (pain management) who is in agreement.  Check BLE venous reflux study and BLE arterial ultrasound.  Refer to lymphedema clinic.  Pt to elevate legs when resting. Continue torsemide as prescribed by PCP-Dr. Hodges (pt has close follow up with PCP).  Given the severe TTP, will refer to Neurology for evaluation.      HPI:  Mrs. Hawley reports BLE edema has improved over the past week.  Continues to have significant pain in both legs.  Unfortunately, pt was unable to make it to appointment for BLE venous reflux study, and has not been evaluated in the lymphedema clinic.  BLE Arterial Ultrasound on 3/10/2020 showed no evidence of hemodynamically significant infrainguinal PAD bilaterally.  Tri- and biphasic waveforms throughout.      Past Medical History:   Diagnosis Date    Anticoagulant long-term use     Anxiety     Arthritis     Bilateral lower extremity edema     severe chronic    Carotid artery occlusion     Cataract     Coronary artery disease     subtotalled LAD with collateral    Depression     Fever blister     Hypothyroid     Iron deficiency anemia     Lumbar radiculopathy     with chronic pain    Ocular migraine     Sleep apnea     cpap     Past Surgical History:   Procedure Laterality Date    ADENOIDECTOMY      BACK SURGERY      x 12    CARDIAC CATHETERIZATION  2016    subtotalled LAD with right to left collaterals    CATARACT EXTRACTION W/  INTRAOCULAR LENS IMPLANT Left     Dr Coleman     CYSTOSCOPIC LITHOLAPAXY N/A 6/27/2019    Procedure: CYSTOLITHOLAPAXY;  Surgeon: Shireen Mayo, " MD;  Location: [facility] OR 2ND FLR;  Service: Urology;  Laterality: N/A;    CYSTOSCOPIC LITHOLAPAXY N/A 9/3/2019    Procedure: CYSTOLITHOLAPAXY;  Surgeon: Shireen Mayo MD;  Location: [facility] OR 2ND FLR;  Service: Urology;  Laterality: N/A;    ESOPHAGOGASTRODUODENOSCOPY N/A 5/23/2018    Procedure: ESOPHAGOGASTRODUODENOSCOPY (EGD);  Surgeon: Prince Vance MD;  Location: [facility] (4TH FLR);  Service: Endoscopy;  Laterality: N/A;  r/s 'd per Dr. Vance due to family emergency- ER    HYSTERECTOMY  1975    endometriosis    pain pump placement      SQ Dilaudid Pump managed by Dr. Hillman, Pain Management    REPLACEMENT OF CATHETER N/A 10/31/2019    Procedure: REPLACEMENT, CATHETER-SUPRAPUBIC;  Surgeon: Shireen Mayo MD;  Location: [facility] OR 1ST FLR;  Service: Urology;  Laterality: N/A;    SPINAL CORD STIMULATOR REMOVAL      before Larissa    SPINE SURGERY  5-13-13    CERVICAL FUSION    TONSILLECTOMY       Social History     Socioeconomic History    Marital status:      Spouse name: Not on file    Number of children: Not on file    Years of education: Not on file    Highest education level: Not on file   Occupational History    Not on file   Social Needs    Financial resource strain: Not on file    Food insecurity:     Worry: Not on file     Inability: Not on file    Transportation needs:     Medical: Not on file     Non-medical: Not on file   Tobacco Use    Smoking status: Never Smoker    Smokeless tobacco: Never Used   Substance and Sexual Activity    Alcohol use: Never     Frequency: Never    Drug use: No    Sexual activity: Never   Lifestyle    Physical activity:     Days per week: Not on file     Minutes per session: Not on file    Stress: Not on file   Relationships    Social connections:     Talks on phone: Not on file     Gets together: Not on file     Attends Restorationist service: Not on file     Active member of club or organization: Not on file     Attends meetings of clubs or  organizations: Not on file     Relationship status: Not on file   Other Topics Concern    Are you pregnant or think you may be? Not Asked    Breast-feeding Not Asked   Social History Narrative    Not on file     Family History   Problem Relation Age of Onset    Cancer Mother 55        breast    Cancer Father         esophagus,had laryngectomy    Esophageal cancer Father     Parkinsonism Maternal Grandmother     Tremor Maternal Grandmother     No Known Problems Brother     No Known Problems Brother     Heart disease Maternal Uncle     Colon cancer Maternal Uncle         Less than 60    No Known Problems Sister     No Known Problems Maternal Aunt     No Known Problems Paternal Aunt     No Known Problems Paternal Uncle     No Known Problems Maternal Grandfather     No Known Problems Paternal Grandmother     No Known Problems Paternal Grandfather     Melanoma Neg Hx     Amblyopia Neg Hx     Blindness Neg Hx     Cataracts Neg Hx     Diabetes Neg Hx     Glaucoma Neg Hx     Hypertension Neg Hx     Macular degeneration Neg Hx     Retinal detachment Neg Hx     Strabismus Neg Hx     Stroke Neg Hx     Thyroid disease Neg Hx        Review of patient's allergies indicates:   Allergen Reactions    (d)-limonene flavor      Other reaction(s): difficult intubation  Other reaction(s): Difficulty breathing    Bactrim [sulfamethoxazole-trimethoprim] Anaphylaxis    Benadryl [diphenhydramine hcl] Shortness Of Breath    Imitrex [sumatriptan succinate] Shortness Of Breath    Topamax [topiramate] Shortness Of Breath    Vancomycin Shortness Of Breath     Rash    Butorphanol tartrate     Darvocet a500 [propoxyphene n-acetaminophen]      Other reaction(s): Difficulty breathing    Fentanyl      Other reaction(s): Vomiting  Other reaction(s): Nausea  Other reaction(s): Itching  swelling    White petrolatum-zinc     Zinc oxide-white petrolatum      Other reaction(s): Difficulty breathing    Latex,  natural rubber Itching and Rash    Phenytoin Rash and Other (See Comments)     Trouble breathing       Medication List with Changes/Refills   Current Medications    ASCORBIC ACID, VITAMIN C, (VITAMIN C) 500 MG TABLET    Take 500 mg by mouth once daily.    ASPIRIN (ECOTRIN) 81 MG EC TABLET    Take 1 tablet (81 mg total) by mouth once daily.    ATORVASTATIN (LIPITOR) 80 MG TABLET    TAKE 1 TABLET BY MOUTH DAILY    BUTALBITAL-ACETAMINOPHEN-CAFFEINE -40 MG (FIORICET, ESGIC) -40 MG PER TABLET    Take 1 tablet by mouth every 4 (four) hours as needed for Headaches.     DOXYCYCLINE (MONODOX) 100 MG CAPSULE    Take 1 capsule (100 mg total) by mouth 2 (two) times daily. Use sunscreen or cover and continue 1 week after completed. for 7 days    FLUOXETINE 20 MG CAPSULE    Take 3 capsules (60 mg total) by mouth once daily.    FUROSEMIDE (LASIX) 40 MG TABLET    Take 3 tablets (120 mg total) by mouth once daily. Two in the morning and one in the evening    GABAPENTIN (NEURONTIN) 100 MG CAPSULE    Take 1 capsule (100 mg total) by mouth 3 (three) times daily.    HYDROCODONE-ACETAMINOPHEN (NORCO)  MG PER TABLET    Take 1 tablet by mouth every 6 (six) hours as needed for Pain.    INTRATHECAL PAIN PUMP COMPOUND    Hydromorphone (7.5 mg/mL) infusion at 3.6799 mg/day (0.1533 mg/hr) out of a total reservoir volume of 37.3 mL    LEVOTHYROXINE (SYNTHROID) 125 MCG TABLET    Take 1 tablet (125 mcg total) by mouth before breakfast.    LIDOCAINE (LIDODERM) 5 %    Place 1 patch onto the skin daily as needed (pain). USE AS DIRECTED WEAR FOR A MAXIMUM OF 12 HOURS    OXYBUTYNIN (DITROPAN XL) 15 MG TR24    Take 15 mg by mouth once daily.    OXYCODONE-ACETAMINOPHEN (PERCOCET)  MG PER TABLET        PANTOPRAZOLE (PROTONIX) 40 MG TABLET    Take 1 tablet (40 mg total) by mouth once daily.    POLYETHYLENE GLYCOL (GLYCOLAX) 17 GRAM PWPK    Take 17 g by mouth once daily.    POTASSIUM CHLORIDE SA (K-DUR,KLOR-CON) 10 MEQ TABLET         "POTASSIUM CITRATE (UROCIT-K) 10 MEQ (1,080 MG) TBSR    Take 1 tablet (10 mEq total) by mouth once daily.    PROMETHAZINE (PHENERGAN) 25 MG TABLET    Take 25 mg by mouth every 6 (six) hours as needed for Nausea.    QUETIAPINE (SEROQUEL) 100 MG TAB    TAKE 1 TABLET (100 MG TOTAL) BY MOUTH EVERY EVENING.    QUETIAPINE (SEROQUEL) 25 MG TAB    Take 12.5 mg twice daily as needed for anxiety    SENNA (SENNA LAX) 8.6 MG TABLET    Take 2 tablets by mouth 2 (two) times daily.    TIZANIDINE (ZANAFLEX) 4 MG TABLET        TORSEMIDE (DEMADEX) 10 MG TAB    Take 1 tablet (10 mg total) by mouth once daily. for 7 days    TRAZODONE (DESYREL) 100 MG TABLET    Take 3 tablets (300 mg total) by mouth every evening.       Review of Systems  Constitution: Denies chills, fever, and sweats.  HENT: Denies headaches or blurry vision.  Cardiovascular: Denies chest pain or irregular heart beat.  Respiratory: Denies cough or shortness of breath.  Gastrointestinal: Denies abdominal pain, nausea, or vomiting.  Musculoskeletal: Denies muscle cramps.  Neurological: Denies dizziness or focal weakness.  Psychiatric/Behavioral: Normal mental status.  Hematologic/Lymphatic: Denies bleeding problem or easy bruising/bleeding.  Skin: Denies rash or suspicious lesions    Physical Examination  /76   Pulse 60   Ht 5' 5" (1.651 m)   Wt 82.6 kg (182 lb)   LMP  (LMP Unknown)   BMI 30.29 kg/m²     Constitutional: No acute distress, conversant  HEENT: Sclera anicteric, Pupils equal, round and reactive to light, extraocular motions intact, Oropharynx clear  Neck: No JVD, no carotid bruits  Cardiovascular: regular rate and rhythm, no murmur, rubs or gallops, normal S1/S2  Pulmonary: Clear to auscultation bilaterally  Abdominal: Abdomen soft, nontender, nondistended, positive bowel sounds  Extremities:  2+ pitting BLE edema with changes of lymphedema.  Pt has severe tenderness to palpation of both legs with light touch.    Pulses:  Carotid pulses are 2+ on " the right side, and 2+ on the left side.  Radial pulses are 2+ on the right side, and 2+ on the left side.   Femoral pulses are 2+ on the right side, and 2+ on the left side.  Popliteal pulses are 2+ on the right side, and 2+ on the left side.   Dorsalis pedis pulses are 2+ on the right side, and 2+ on the left side.   Posterior tibial pulses are 2+ on the right side, and 2+ on the left side.    Skin: No ecchymosis, erythema, or ulcers  Psych: Alert and oriented x 3, appropriate affect  Neuro: CNII-XII intact, no focal deficits    Labs:  Most Recent Data  CBC:   Lab Results   Component Value Date    WBC 4.96 01/31/2020    HGB 9.5 (L) 01/31/2020    HCT 31.7 (L) 01/31/2020     01/31/2020    MCV 91 01/31/2020    RDW 13.6 01/31/2020     BMP:   Lab Results   Component Value Date     01/31/2020    K 3.9 01/31/2020     01/31/2020    CO2 29 01/31/2020    BUN 4 (L) 01/31/2020    CREATININE 0.8 01/31/2020     (H) 01/31/2020    CALCIUM 8.6 (L) 01/31/2020    MG 1.9 01/31/2020    PHOS 2.9 01/31/2020     LFTS;   Lab Results   Component Value Date    PROT 5.9 (L) 01/31/2020    ALBUMIN 3.2 (L) 01/31/2020    BILITOT 0.3 01/31/2020    AST 18 01/31/2020    ALKPHOS 89 01/31/2020    ALT 9 (L) 01/31/2020    GGT 51 04/30/2015     COAGS:   Lab Results   Component Value Date    INR 0.9 05/27/2014     FLP:   Lab Results   Component Value Date    CHOL 122 02/21/2019    HDL 47 02/21/2019    LDLCALC 63.8 02/21/2019    TRIG 56 02/21/2019    CHOLHDL 38.5 02/21/2019     CARDIAC:   Lab Results   Component Value Date    TROPONINI <0.006 01/28/2020    BNP 17 01/27/2020     BLE Arterial Ultrasound 3/10/2020:  No evidence of hemodynamically significant infrainguinal PAD bilaterally.  Tri- and biphasic waveforms throughout.  Borderline normal KENDALL bilaterally.    BLE Venous Ultrasound 1/30/2020:  No evidence of deep venous thrombosis in either lower extremity.    Assessment/Plan:  Tasha Hawley is a 64 y.o. female with a  past medical history of BLE lymphedema, CAD, lumbar radiculopathy, anxiety, hypothyroidism, CECI (on CPAP), neurogenic bladder with meadows at home (changed every 3 weeks), and chronic back pain since a work accident in 1997 (has had 12 back surgeries) on dilaudid pump, who presents for a follow up appointment.    1. BLE Lymphedema with Severe TTP- Mrs. Hawley reports BLE edema has improved over the past week.  Continues to have significant pain in both legs.  Check BLE venous reflux study.  Refer to lymphedema clinic.  Pt to elevate legs when resting. Continue torsemide as prescribed by PCP-Dr. Hodges (pt has close follow up with PCP).   Pt is awaiting Neuro evaluation for leg pain.      Follow up in 2 months    Total duration of face to face visit time 30 minutes.  Total time spent counseling greater than fifty percent of total visit time.  Counseling included discussion regarding imaging findings, diagnosis, possibilities, treatment options, risks and benefits.  The patient had many questions regarding the options and long-term effects.    Kalpesh Haney MD, PhD  Interventional Cardiology

## 2020-03-20 NOTE — PROGRESS NOTES
CHIEF COMPLAINT:    Mrs. Hawley is a 64 y.o. female presenting for a follow up on recurrent UTI, patient with suprapubic tube, status post obstructing autologous sling.    PRESENTING ILLNESS:    Tasha Hawley is a 64 y.o. female who returns stating that she has had ongoing suprapubic pressure, burning.  The suprapubic tube is being changed every 3 weeks, the last time it was changed was last week.  She is presently on doxycycline and does not wish to be examined today.  She and her  had questions having it so she wears a bag on her abdomen to collect the urine as she heard from a neighbor that someone had that and it was much less trouble.     REVIEW OF SYSTEMS:    Review of Systems   Constitutional: Negative.    HENT: Negative.    Eyes: Negative.    Respiratory: Negative.    Cardiovascular: Positive for leg swelling.        Has compensated diastolic heart failure.    Gastrointestinal: Positive for abdominal pain.   Musculoskeletal: Positive for back pain, joint pain and myalgias.   Skin: Negative.    Neurological: Negative.    Endo/Heme/Allergies: Negative.    Psychiatric/Behavioral: The patient is nervous/anxious.        PATIENT HISTORY:    Past Medical History:   Diagnosis Date    Anticoagulant long-term use     Anxiety     Arthritis     Bilateral lower extremity edema     severe chronic    Carotid artery occlusion     Cataract     Coronary artery disease     subtotalled LAD with collateral    Depression     Fever blister     Hypothyroid     Iron deficiency anemia     Lumbar radiculopathy     with chronic pain    Ocular migraine     Sleep apnea     cpap       Past Surgical History:   Procedure Laterality Date    ADENOIDECTOMY      BACK SURGERY      x 12    CARDIAC CATHETERIZATION  2016    subtotalled LAD with right to left collaterals    CATARACT EXTRACTION W/  INTRAOCULAR LENS IMPLANT Left     Dr Coleman     CYSTOSCOPIC LITHOLAPAXY N/A 6/27/2019    Procedure: CYSTOLITHOLAPAXY;   Surgeon: Shireen Mayo MD;  Location: [facility] OR 2ND FLR;  Service: Urology;  Laterality: N/A;    CYSTOSCOPIC LITHOLAPAXY N/A 9/3/2019    Procedure: CYSTOLITHOLAPAXY;  Surgeon: Shireen Mayo MD;  Location: [facility] OR 2ND FLR;  Service: Urology;  Laterality: N/A;    ESOPHAGOGASTRODUODENOSCOPY N/A 5/23/2018    Procedure: ESOPHAGOGASTRODUODENOSCOPY (EGD);  Surgeon: Prince Vance MD;  Location: [facility] ENDO (4TH FLR);  Service: Endoscopy;  Laterality: N/A;  r/s 'd per Dr. Vance due to family emergency- ER    HYSTERECTOMY  1975    endometriosis    pain pump placement      SQ Dilaudid Pump managed by Dr. Hillman, Pain Management    REPLACEMENT OF CATHETER N/A 10/31/2019    Procedure: REPLACEMENT, CATHETER-SUPRAPUBIC;  Surgeon: Shireen Mayo MD;  Location: [facility] OR 1ST FLR;  Service: Urology;  Laterality: N/A;    SPINAL CORD STIMULATOR REMOVAL      before [place]    SPINE SURGERY  5-13-13    CERVICAL FUSION    TONSILLECTOMY         Family History   Problem Relation Age of Onset    Cancer Mother 55        breast    Cancer Father         esophagus,had laryngectomy    Esophageal cancer Father     Parkinsonism Maternal Grandmother     Tremor Maternal Grandmother     No Known Problems Brother     No Known Problems Brother     Heart disease Maternal Uncle     Colon cancer Maternal Uncle         Less than 60     Socioeconomic History    Marital status:    Tobacco Use    Smoking status: Never Smoker    Smokeless tobacco: Never Used   Substance and Sexual Activity    Alcohol use: Never     Frequency: Never    Drug use: No    Sexual activity: Never       Allergies:  (d)-limonene flavor; Bactrim [sulfamethoxazole-trimethoprim]; Benadryl [diphenhydramine hcl]; Imitrex [sumatriptan succinate]; Topamax [topiramate]; Vancomycin; Butorphanol tartrate; Darvocet a500 [propoxyphene n-acetaminophen]; Fentanyl; White petrolatum-zinc; Zinc oxide-white petrolatum; Latex, natural rubber; and  Phenytoin    Medications:  Outpatient Encounter Medications as of 3/11/2020   Medication Sig Dispense Refill    ascorbic acid, vitamin C, (VITAMIN C) 500 MG tablet Take 500 mg by mouth once daily.      atorvastatin (LIPITOR) 80 MG tablet TAKE 1 TABLET BY MOUTH DAILY 90 tablet 3    butalbital-acetaminophen-caffeine -40 mg (FIORICET, ESGIC) -40 mg per tablet Take 1 tablet by mouth every 4 (four) hours as needed for Headaches.       [] doxycycline (MONODOX) 100 MG capsule Take 1 capsule (100 mg total) by mouth 2 (two) times daily. Use sunscreen or cover and continue 1 week after completed. for 7 days 14 capsule 0    FLUoxetine 20 MG capsule Take 3 capsules (60 mg total) by mouth once daily. 270 capsule 1    furosemide (LASIX) 40 MG tablet Take 3 tablets (120 mg total) by mouth once daily. Two in the morning and one in the evening 270 tablet 3    gabapentin (NEURONTIN) 100 MG capsule Take 1 capsule (100 mg total) by mouth 3 (three) times daily. 90 capsule 2    HYDROcodone-acetaminophen (NORCO)  mg per tablet Take 1 tablet by mouth every 6 (six) hours as needed for Pain. 15 tablet 0    intrathecal pain pump compound Hydromorphone (7.5 mg/mL) infusion at 3.6799 mg/day (0.1533 mg/hr) out of a total reservoir volume of 37.3 mL      levothyroxine (SYNTHROID) 125 MCG tablet Take 1 tablet (125 mcg total) by mouth before breakfast. 90 tablet 3    lidocaine (LIDODERM) 5 % Place 1 patch onto the skin daily as needed (pain). USE AS DIRECTED WEAR FOR A MAXIMUM OF 12 HOURS  5    oxybutynin (DITROPAN XL) 15 MG TR24 Take 15 mg by mouth once daily.      oxyCODONE-acetaminophen (PERCOCET)  mg per tablet       pantoprazole (PROTONIX) 40 MG tablet Take 1 tablet (40 mg total) by mouth once daily. 90 tablet 3    polyethylene glycol (GLYCOLAX) 17 gram PwPk Take 17 g by mouth once daily. 30 each 2    potassium chloride SA (K-DUR,KLOR-CON) 10 MEQ tablet       potassium citrate (UROCIT-K) 10 mEq  (1,080 mg) TbSR Take 1 tablet (10 mEq total) by mouth once daily.      promethazine (PHENERGAN) 25 MG tablet Take 25 mg by mouth every 6 (six) hours as needed for Nausea.      QUEtiapine (SEROQUEL) 100 MG Tab TAKE 1 TABLET (100 MG TOTAL) BY MOUTH EVERY EVENING. 90 tablet 1    QUEtiapine (SEROQUEL) 25 MG Tab Take 12.5 mg twice daily as needed for anxiety 90 tablet 1    senna (SENNA LAX) 8.6 mg tablet Take 2 tablets by mouth 2 (two) times daily.      tiZANidine (ZANAFLEX) 4 MG tablet       traZODone (DESYREL) 100 MG tablet Take 3 tablets (300 mg total) by mouth every evening. 270 tablet 1    aspirin (ECOTRIN) 81 MG EC tablet Take 1 tablet (81 mg total) by mouth once daily.  0    torsemide (DEMADEX) 10 MG Tab Take 1 tablet (10 mg total) by mouth once daily. for 7 days 7 tablet 0    [DISCONTINUED] lamotrigine (LAMICTAL) 25 MG tablet Take 1 tablet (25 mg total) by mouth once daily. 30 tablet 6     No facility-administered encounter medications on file as of 3/11/2020.          PHYSICAL EXAMINATION:    The patient generally appears in good health, is appropriately interactive, and is in no apparent distress.    Skin: No lesions.    Mental: Cooperative with normal affect.    Neuro: Grossly intact.    HEENT: Normal. No evidence of lymphadenopathy.    Chest:  normal inspiratory effort.    Abdomen:  Soft, non-tender. No masses or organomegaly. Bladder is not palpable. No evidence of flank discomfort. No evidence of inguinal hernia.  The suprapubic tube is draining clear yellow urine.      Extremities: No clubbing, cyanosis, or edema      LABS:    Lab Results   Component Value Date    BUN 4 (L) 01/31/2020    CREATININE 0.8 01/31/2020       IMPRESSION:    Encounter Diagnoses   Name Primary?    Neurogenic bladder Yes    Suprapubic catheter        PLAN:    1.  Discussed that it sounds like the person with whom she spoke was talking about an ileal conduit.  This is a big surgery, generally, the bladder is removed as  well.  With all of her co morbidities, she is not a candidate for this type of surgery.  We discussed what it entails.    2.  She will continue with the suprapubic tube (she was in agreement along with her .)

## 2020-03-23 ENCOUNTER — TELEPHONE (OUTPATIENT)
Dept: HOME HEALTH SERVICES | Facility: HOSPITAL | Age: 65
End: 2020-03-23

## 2020-03-23 ENCOUNTER — TELEPHONE (OUTPATIENT)
Dept: REHABILITATION | Facility: HOSPITAL | Age: 65
End: 2020-03-23

## 2020-03-23 DIAGNOSIS — N39.0 RECURRENT UTI (URINARY TRACT INFECTION): Primary | ICD-10-CM

## 2020-03-23 NOTE — TELEPHONE ENCOUNTER
Pt was contacted by phone to inform of suspending lymphedema services with OP PT per COVID -19 concerns.     NEW eval 4/3/20 per Dr. Haney- cancelled per COVID 19  Pt was No show for Eval 3/2/20 for same referral.      Confirmed contact information and understanding of plan of care to be determined.   Due to ever changing situation remaining schedule to be determined.     Pt voiced understanding.

## 2020-03-24 NOTE — TELEPHONE ENCOUNTER
----- Message from Bryanna Crowder sent at 3/24/2020 12:21 PM CDT -----  Contact: Celina mike/[facility] [phone]  Calling for orders for urine culture to be entered in Epic. Specimen has been collected.  Please call

## 2020-03-27 ENCOUNTER — TELEPHONE (OUTPATIENT)
Dept: UROLOGY | Facility: CLINIC | Age: 65
End: 2020-03-27

## 2020-03-27 DIAGNOSIS — R39.9 UTI SYMPTOMS: Primary | ICD-10-CM

## 2020-03-27 DIAGNOSIS — B37.49 CANDIDA UTI: ICD-10-CM

## 2020-03-27 RX ORDER — FLUCONAZOLE 100 MG/1
TABLET ORAL
Qty: 8 TABLET | Refills: 0 | Status: ON HOLD | OUTPATIENT
Start: 2020-03-27 | End: 2020-05-17 | Stop reason: HOSPADM

## 2020-03-27 NOTE — TELEPHONE ENCOUNTER
----- Message from Sis Calixto sent at 3/27/2020 11:50 AM CDT -----  Contact: pt: [phone]  Pt state she has another infection would like to speak with staff       Please contact pt: [phone]

## 2020-03-30 ENCOUNTER — TELEPHONE (OUTPATIENT)
Dept: UROLOGY | Facility: CLINIC | Age: 65
End: 2020-03-30

## 2020-04-03 ENCOUNTER — TELEPHONE (OUTPATIENT)
Dept: ENDOSCOPY | Facility: HOSPITAL | Age: 65
End: 2020-04-03

## 2020-04-03 DIAGNOSIS — Z12.11 SPECIAL SCREENING FOR MALIGNANT NEOPLASMS, COLON: Primary | ICD-10-CM

## 2020-04-03 RX ORDER — POLYETHYLENE GLYCOL 3350, SODIUM SULFATE ANHYDROUS, SODIUM BICARBONATE, SODIUM CHLORIDE, POTASSIUM CHLORIDE 236; 22.74; 6.74; 5.86; 2.97 G/4L; G/4L; G/4L; G/4L; G/4L
4 POWDER, FOR SOLUTION ORAL ONCE
Qty: 4000 ML | Refills: 0 | Status: SHIPPED | OUTPATIENT
Start: 2020-04-03 | End: 2020-04-03

## 2020-04-03 NOTE — TELEPHONE ENCOUNTER
Dr. Vance,    Per your recommendations, Pt was contacted to confirm EGD and colonoscopy on 4/15/20.  Pt confirmed EGD and colonoscopy on 4/15/20 at 0815 with 0700 arrival on 2nd floor endoscopy. New visitor/drop off policy reviewed, Prep instructions reviewed via telephone call and mailed to Pt, Pt verbalized understanding.      Thank you

## 2020-04-08 ENCOUNTER — PATIENT MESSAGE (OUTPATIENT)
Dept: ENDOSCOPY | Facility: HOSPITAL | Age: 65
End: 2020-04-08

## 2020-04-08 ENCOUNTER — TELEPHONE (OUTPATIENT)
Dept: PSYCHIATRY | Facility: CLINIC | Age: 65
End: 2020-04-08

## 2020-04-08 RX ORDER — QUETIAPINE FUMARATE 25 MG/1
TABLET, FILM COATED ORAL
Qty: 180 TABLET | Refills: 1 | Status: ON HOLD | OUTPATIENT
Start: 2020-04-08 | End: 2020-05-17 | Stop reason: HOSPADM

## 2020-04-08 NOTE — TELEPHONE ENCOUNTER
Reports some anxiety and insomnia.    Overall sounds to be doing reasonably well psychiatrically.    Current psychotropic meds: Prozac 60 mg qam, Seroquel 100 mg at bedtime, Seroquel 25 mg bid prn, Trazodone 300 mg at bedtime

## 2020-04-10 ENCOUNTER — PATIENT MESSAGE (OUTPATIENT)
Dept: ENDOSCOPY | Facility: HOSPITAL | Age: 65
End: 2020-04-10

## 2020-04-10 ENCOUNTER — TELEPHONE (OUTPATIENT)
Dept: GASTROENTEROLOGY | Facility: CLINIC | Age: 65
End: 2020-04-10

## 2020-04-10 DIAGNOSIS — Z79.899 ENCOUNTER FOR MONITORING LONG-TERM PROTON PUMP INHIBITOR THERAPY: ICD-10-CM

## 2020-04-10 DIAGNOSIS — D50.9 IRON DEFICIENCY ANEMIA, UNSPECIFIED IRON DEFICIENCY ANEMIA TYPE: Primary | ICD-10-CM

## 2020-04-10 DIAGNOSIS — Z51.81 ENCOUNTER FOR MONITORING LONG-TERM PROTON PUMP INHIBITOR THERAPY: ICD-10-CM

## 2020-04-13 ENCOUNTER — TELEPHONE (OUTPATIENT)
Dept: ENDOSCOPY | Facility: HOSPITAL | Age: 65
End: 2020-04-13

## 2020-04-13 ENCOUNTER — TELEPHONE (OUTPATIENT)
Dept: GASTROENTEROLOGY | Facility: CLINIC | Age: 65
End: 2020-04-13

## 2020-04-13 NOTE — PROGRESS NOTES
[facility]  Progress Note    Patient Name: Tasha Hawley  MRN: [id number]  Patient Class: IP- Inpatient   Admission Date: 6/7/2018  Length of Stay: 2 days  Attending Physician: Kwame Concepcion MD  Primary Care Provider: Mesfin Hodges Ii, MD        Subjective:     Principal Problem:Recurrent UTI (urinary tract infection)    HPI:  Tasha Hawley is a 61 y.o. white woman with hypothyroidism, coronary artery disease with history of acute coronary syndrome in January 2016 and ischemic cardiomyopathy (ejection fraction since improved), thoracic aortic atherosclerosis, severe left atrial enlargement, diastolic dysfunction, gastroesophageal reflux disease status post Nissen fundoplication, right facial droop since birth, history of work-related back injury in the 1990s with scoliosis, lumbar degenerative disc disease, lumbar facet arthropathy, history of multiple fusions and spacers from L3-S1, status post spinal cord stimulator and intrathecal hydromorphone pump, chronic constipation, neurogenic bladder status post suprapubic catheter placement, recurrent PROTEUS MIRABILIS urinary tract infections (resistant to cipro), obstructive sleep apnea, lower extremity lymphedema, chronic insomnia, and depression.  She has difficulty ambulating at baseline.  She lives in [place], [place].  She is .  Her primary care physician is Dr. Mesfin Hodges.  Her pain management specialist is Dr. Nigel Hillman.  Her urologist is Dr. Shireen Mayo.  Her gastroenterologist is Dr. Prince Vance.  Her psychiatrist is Dr. Erik Oconnor.  She has many antibiotic allergies. She can tolerate cephalosporins.    She presented to [facility] ED due to intractable vomiting starting yesterday morning. She has been unable to retain any liquids or solids. She has associated abdominal pain but denies any blood in vomit, fever or chills. She had episodes of diarrhea yesterday but none today. She was  Palliative Care Acknowledgement of Consult - .date    Consult received. Palliative Care Provider:_Dr. Garcia_________ will touch base with team prior to seeing patient. Full consult to follow.    Thank you for allowing us to be a part of the care of this patient.          Faye Spaulding, CINTHIA, ACHP-SW   seen at an urgent care today for symptoms and was told she had an UTI and referred to the ED for further evaluation. She admits to having blood in urine of catheter bag.  She is afebrile with no leukocytosis.  She has a bump in her BUN 18 (up from 7), and Creatinine 1.5 (up from 0.7).  Her shows significant UTI with + Nitrites, RBC > 100,  with clumps and moderate bacteria. Suprapubic cath last changed on 5/25/18.  She was given 1 liter of normal saline, zofran, and 1 gram of ceftriaxone. She is admitted to [facility].        Hospital Course:  No notes on file    Interval History: Having flatus but no BM yet. Still with increased abdominal pain.     Review of Systems   Constitutional: Negative for chills and fever.   Respiratory: Negative for cough and shortness of breath.    Cardiovascular: Negative for chest pain and palpitations.   Gastrointestinal: Negative for nausea and vomiting.     Objective:     Vital Signs (Most Recent):  Temp: 96.2 °F (35.7 °C) (06/10/18 0726)  Pulse: (!) 55 (06/10/18 0800)  Resp: 20 (06/10/18 0726)  BP: (!) 114/56 (06/10/18 0726)  SpO2: 95 % (06/10/18 1020) Vital Signs (24h Range):  Temp:  [96.2 °F (35.7 °C)-98.2 °F (36.8 °C)] 96.2 °F (35.7 °C)  Pulse:  [52-72] 55  Resp:  [16-20] 20  SpO2:  [95 %-99 %] 95 %  BP: ()/(47-56) 114/56     Weight: 69.4 kg (153 lb)  Body mass index is 23.96 kg/m².    Intake/Output Summary (Last 24 hours) at 06/10/18 1151  Last data filed at 06/10/18 0700   Gross per 24 hour   Intake              555 ml   Output             2600 ml   Net            -2045 ml      Physical Exam   Constitutional: She is oriented to person, place, and time. She appears well-developed. No distress.   Cardiovascular: Normal rate and regular rhythm.    No murmur heard.  Pulmonary/Chest: Effort normal and breath sounds normal. No respiratory distress. She has no wheezes.   Abdominal: Soft. Bowel sounds are normal. She exhibits no distension. There is  tenderness.   SPT in place   Musculoskeletal: She exhibits no edema.   Neurological: She is alert and oriented to person, place, and time.   Skin: Skin is warm and dry.   Psychiatric: She has a normal mood and affect. Her behavior is normal.   Nursing note and vitals reviewed.      Significant Labs:   CBC:   Recent Labs  Lab 06/09/18  0400 06/10/18  0317   WBC 5.12 6.14   HGB 12.2 11.5*   HCT 38.6 36.2*    188     CMP:   Recent Labs  Lab 06/09/18  0400 06/10/18  0317    139   K 4.4 3.6    101   CO2 27 32*   GLU 93 94   BUN 10 9   CREATININE 0.8 0.8   CALCIUM 8.8 8.9   ANIONGAP 8 6*   EGFRNONAA >60 >60     Magnesium:   Recent Labs  Lab 06/09/18  0400 06/10/18  0317   MG 2.0 1.8       Significant Imaging: I have reviewed and interpreted all pertinent imaging results/findings within the past 24 hours. KUB: Significant stool throughout colon, non-obstructive bowel gas pattern.     Assessment/Plan:      * Recurrent UTI (urinary tract infection)    Neurogenic bladder  Suprapubic catheter  Appreciate assistance from Dr. Villalba for SPT exchange. UA and culture post exchange were still positive. UCx from admission with Proteus, but UCx after exchange with Staph aureus. She has recurrent PROTEUS MIRABILIS urinary tract infections (resistant to cipro).  Continue Ceftriaxone. Will start on clindamycin as well for the Staph. Continues to complain of significant abdominal pain, so will repeat CT scan today.         Intractable nausea and vomiting    PRN Nausea Medication.           Chronic combined systolic and diastolic congestive heart failure    Bilateral carotid artery disease  Coronary artery disease involving native coronary artery of native heart without angina pectoris  8/21/17 Echo, Normal EF + DD  Continue home aspirin 81 mg daily, clopidogrel 75 mg daily            Lymphedema of both lower extremities    Improving. Wearing 2 walking boots. Continue home furosemide  40 mg BID and Potassium 20 mequ  daily        Primary hypothyroidism    Continue home levothyroxine 125 mcg daily. TSH is 1.225.         GERD (gastroesophageal reflux disease)    Continue home pepcid 20 mg daily          Chronic pain syndrome    Cervical myelopathy  Degenerative disc disease, lumbar  S/P insertion of spinal cord stimulator  S/P insertion of intrathecal pump (Continious Dilaudid Infusion)  Unsure of Dilaudid infusion dose.  Continue home medications: PRN tizanidine 4 mg TID prn, Hydrocodone-APAP  mg every 4 hours PRN. Give pericolace today to help with constipation.           Iron deficiency anemia    Hgb is stable at 12.         Generalized anxiety disorder    Major depressive disorder, recurrent, mild  Continue home Trazadone 200 mg daily, Fluoxetine 60 mg daily, bupropion 24 hr 300 mg daily            VTE Risk Mitigation         Ordered     enoxaparin injection 40 mg  Every 12 hours      06/08/18 0254     IP VTE HIGH RISK PATIENT  Once      06/08/18 0254              Kwame Concepcion MD  Department of Hospital Medicine   [facility]

## 2020-04-13 NOTE — TELEPHONE ENCOUNTER
"Dr. Vance,      Pt contacted to reschedule EGD and colonoscopy. Pt stated "I don't want to reschedule until all of this is over. " Pt informed of importance of rescheduling EGD and colonoscopy. Pt offered dates in April and May 2020, Pt refused. Pt stated that she would reschedule for June 2020.     Pt rescheduled EGD and colonoscopy for 6/11/20 at 0900 with 0800 arrival time on 2nd floor endoscopy. Pt prep instructions mailed to Pt. Pt verified that she has received peg 3350 colonoscopy prep. Pt verbalized understanding.     Thank you  "

## 2020-04-13 NOTE — TELEPHONE ENCOUNTER
Called and spoke to pt.  Pt scheduled for audio visit with Dr. Vance on 04/21/2020 at 9:00 am.  Pt scheduled for labs on 04/14/2020.  Pt provided with number to schedule for COVID-19 testing, [phone].  Pt appreciated the call.

## 2020-04-13 NOTE — TELEPHONE ENCOUNTER
"Dr. Vance,     Per your recommendations, Pt was scheduled for EGD and colonoscopy on 4/15/20.  Pt called endoscopy scheduling to cancel EGD and colonoscopy. Pt stated  " I'm afraid to come to the hospital right now, I want to cancel, I'll call back to reschedule." Per Pt request, Pt removed from the schedule for EGD and colonoscopy on 4/15/20. Endoscopy scheduling number provided to Pt, [phone]. Pt verbalized understanding.     Thank you        "

## 2020-04-13 NOTE — TELEPHONE ENCOUNTER
----- Message from Rita Persaud sent at 4/13/2020  8:30 AM CDT -----  Pt called stated she want to want until after covid 19 is over with before she have her labs done.  Call back [phone]

## 2020-04-15 ENCOUNTER — TELEPHONE (OUTPATIENT)
Dept: GASTROENTEROLOGY | Facility: CLINIC | Age: 65
End: 2020-04-15

## 2020-04-15 ENCOUNTER — LAB VISIT (OUTPATIENT)
Dept: LAB | Facility: HOSPITAL | Age: 65
End: 2020-04-15
Attending: INTERNAL MEDICINE
Payer: MEDICARE

## 2020-04-15 ENCOUNTER — LAB VISIT (OUTPATIENT)
Dept: INTERNAL MEDICINE | Facility: CLINIC | Age: 65
End: 2020-04-15
Payer: MEDICARE

## 2020-04-15 ENCOUNTER — TELEPHONE (OUTPATIENT)
Dept: INTERNAL MEDICINE | Facility: CLINIC | Age: 65
End: 2020-04-15

## 2020-04-15 DIAGNOSIS — D50.9 IRON DEFICIENCY ANEMIA, UNSPECIFIED IRON DEFICIENCY ANEMIA TYPE: ICD-10-CM

## 2020-04-15 DIAGNOSIS — D64.9 ANEMIA, UNSPECIFIED TYPE: Primary | ICD-10-CM

## 2020-04-15 DIAGNOSIS — Z79.899 ENCOUNTER FOR MONITORING LONG-TERM PROTON PUMP INHIBITOR THERAPY: ICD-10-CM

## 2020-04-15 DIAGNOSIS — Z51.81 ENCOUNTER FOR MONITORING LONG-TERM PROTON PUMP INHIBITOR THERAPY: ICD-10-CM

## 2020-04-15 DIAGNOSIS — D64.9 ANEMIA, UNSPECIFIED TYPE: ICD-10-CM

## 2020-04-15 LAB
25(OH)D3+25(OH)D2 SERPL-MCNC: 26 NG/ML (ref 30–96)
ANION GAP SERPL CALC-SCNC: 11 MMOL/L (ref 8–16)
BASOPHILS # BLD AUTO: 0.01 K/UL (ref 0–0.2)
BASOPHILS NFR BLD: 0.2 % (ref 0–1.9)
BUN SERPL-MCNC: 8 MG/DL (ref 8–23)
CALCIUM SERPL-MCNC: 9.4 MG/DL (ref 8.7–10.5)
CHLORIDE SERPL-SCNC: 97 MMOL/L (ref 95–110)
CO2 SERPL-SCNC: 32 MMOL/L (ref 23–29)
CREAT SERPL-MCNC: 0.8 MG/DL (ref 0.5–1.4)
DIFFERENTIAL METHOD: ABNORMAL
EOSINOPHIL # BLD AUTO: 0 K/UL (ref 0–0.5)
EOSINOPHIL NFR BLD: 0.5 % (ref 0–8)
ERYTHROCYTE [DISTWIDTH] IN BLOOD BY AUTOMATED COUNT: 13.2 % (ref 11.5–14.5)
EST. GFR  (AFRICAN AMERICAN): >60 ML/MIN/1.73 M^2
EST. GFR  (NON AFRICAN AMERICAN): >60 ML/MIN/1.73 M^2
FERRITIN SERPL-MCNC: 37 NG/ML (ref 20–300)
GLUCOSE SERPL-MCNC: 89 MG/DL (ref 70–110)
HCT VFR BLD AUTO: 40.1 % (ref 37–48.5)
HGB BLD-MCNC: 12.8 G/DL (ref 12–16)
IMM GRANULOCYTES # BLD AUTO: 0.02 K/UL (ref 0–0.04)
IMM GRANULOCYTES NFR BLD AUTO: 0.4 % (ref 0–0.5)
IRON SERPL-MCNC: 84 UG/DL (ref 30–160)
LYMPHOCYTES # BLD AUTO: 1.1 K/UL (ref 1–4.8)
LYMPHOCYTES NFR BLD: 20.8 % (ref 18–48)
MAGNESIUM SERPL-MCNC: 1.9 MG/DL (ref 1.6–2.6)
MCH RBC QN AUTO: 28.1 PG (ref 27–31)
MCHC RBC AUTO-ENTMCNC: 31.9 G/DL (ref 32–36)
MCV RBC AUTO: 88 FL (ref 82–98)
MONOCYTES # BLD AUTO: 0.4 K/UL (ref 0.3–1)
MONOCYTES NFR BLD: 6.4 % (ref 4–15)
NEUTROPHILS # BLD AUTO: 3.9 K/UL (ref 1.8–7.7)
NEUTROPHILS NFR BLD: 71.7 % (ref 38–73)
NRBC BLD-RTO: 0 /100 WBC
PLATELET # BLD AUTO: 260 K/UL (ref 150–350)
PMV BLD AUTO: 10.4 FL (ref 9.2–12.9)
POTASSIUM SERPL-SCNC: 3.4 MMOL/L (ref 3.5–5.1)
RBC # BLD AUTO: 4.56 M/UL (ref 4–5.4)
SARS-COV-2 RNA RESP QL NAA+PROBE: NOT DETECTED
SATURATED IRON: 20 % (ref 20–50)
SODIUM SERPL-SCNC: 140 MMOL/L (ref 136–145)
TOTAL IRON BINDING CAPACITY: 413 UG/DL (ref 250–450)
TRANSFERRIN SERPL-MCNC: 279 MG/DL (ref 200–375)
VIT B12 SERPL-MCNC: 309 PG/ML (ref 210–950)
WBC # BLD AUTO: 5.47 K/UL (ref 3.9–12.7)

## 2020-04-15 PROCEDURE — 83735 ASSAY OF MAGNESIUM: CPT | Mod: HCNC

## 2020-04-15 PROCEDURE — 82607 VITAMIN B-12: CPT | Mod: HCNC

## 2020-04-15 PROCEDURE — 83540 ASSAY OF IRON: CPT | Mod: HCNC

## 2020-04-15 PROCEDURE — 85025 COMPLETE CBC W/AUTO DIFF WBC: CPT | Mod: HCNC

## 2020-04-15 PROCEDURE — 82728 ASSAY OF FERRITIN: CPT | Mod: HCNC

## 2020-04-15 PROCEDURE — 82306 VITAMIN D 25 HYDROXY: CPT | Mod: HCNC

## 2020-04-15 PROCEDURE — U0002 COVID-19 LAB TEST NON-CDC: HCPCS | Mod: HCNC

## 2020-04-15 PROCEDURE — 36415 COLL VENOUS BLD VENIPUNCTURE: CPT | Mod: HCNC

## 2020-04-15 PROCEDURE — 80048 BASIC METABOLIC PNL TOTAL CA: CPT | Mod: HCNC

## 2020-04-15 NOTE — TELEPHONE ENCOUNTER
----- Message from Elida Cummings sent at 4/15/2020  1:58 PM CDT -----  Contact: pt [phone]  She states that covid 19 test was cancelled because it's not done at [facility]. She needs orders to have it done at main please call

## 2020-04-15 NOTE — TELEPHONE ENCOUNTER
----- Message from Sis Calixto sent at 4/15/2020  2:35 PM CDT -----  Contact: pt: [phone] or [phone]  Pt would like to speak with Dr. Vance       Please contact pt: [phone] or [phone]

## 2020-04-15 NOTE — TELEPHONE ENCOUNTER
Called and spoke to pt.  Pt states she is now at [facility] having all labs and COVID-19 testing completed.  Pt appreciated the call.

## 2020-04-16 ENCOUNTER — NURSE TRIAGE (OUTPATIENT)
Dept: ADMINISTRATIVE | Facility: CLINIC | Age: 65
End: 2020-04-16

## 2020-04-16 ENCOUNTER — TELEPHONE (OUTPATIENT)
Dept: GASTROENTEROLOGY | Facility: CLINIC | Age: 65
End: 2020-04-16

## 2020-04-16 DIAGNOSIS — E55.9 VITAMIN D INSUFFICIENCY: Primary | ICD-10-CM

## 2020-04-16 DIAGNOSIS — E87.6 LOW BLOOD POTASSIUM: ICD-10-CM

## 2020-04-16 RX ORDER — ACETAMINOPHEN 500 MG
1 TABLET ORAL DAILY
Qty: 90 CAPSULE | Refills: 3 | COMMUNITY
Start: 2020-04-16 | End: 2020-06-17

## 2020-04-16 RX ORDER — ERGOCALCIFEROL 1.25 MG/1
50000 CAPSULE ORAL
Qty: 12 CAPSULE | Refills: 0 | Status: ON HOLD | OUTPATIENT
Start: 2020-04-16 | End: 2020-05-17 | Stop reason: HOSPADM

## 2020-04-16 RX ORDER — FERROUS SULFATE 325(65) MG
325 TABLET ORAL DAILY
Qty: 90 TABLET | Refills: 1 | Status: SHIPPED | OUTPATIENT
Start: 2020-04-16 | End: 2020-06-17

## 2020-04-16 NOTE — TELEPHONE ENCOUNTER
Patient calling for lab results. Will message ordering Physician    Reason for Disposition   Lab result questions   Caller requesting lab results    Additional Information   Negative: [1] Caller is not with the adult (patient) AND [2] reporting urgent symptoms   Negative: Lab calling with strep throat test results and triager can call in prescription   Negative: Lab calling with urinalysis test results and triager can call in prescription   Negative: Medication questions   Negative: ED call to PCP   Negative: Physician call to PCP   Negative: Call about patient who is currently hospitalized   Negative: Lab or radiology calling with CRITICAL test results   Negative: [1] Prescription not at pharmacy AND [2] was prescribed today by PCP   Negative: [1] Follow-up call from patient regarding patient's clinical status AND [2] information urgent   Negative: [1] Caller requests to speak ONLY to PCP AND [2] urgent question   Negative: [1] Caller requests to speak to PCP now AND [2] won't tell us reason for call  (Exception: if 10 pm to 6 am, caller must first discuss reason for the call)   Negative: Notification of hospital admission   Negative: Notification of death    Protocols used: INFORMATION ONLY CALL-A-, PCP CALL - NO TRIAGE-A-AH

## 2020-04-16 NOTE — TELEPHONE ENCOUNTER
----- Message from Sis Calixto sent at 4/16/2020  2:20 PM CDT -----  Contact: pt: [phone]  Pt is calling to speak with Dr. Vance re test results      Please contact pt: [phone]

## 2020-04-17 ENCOUNTER — TELEPHONE (OUTPATIENT)
Dept: GASTROENTEROLOGY | Facility: CLINIC | Age: 65
End: 2020-04-17

## 2020-04-17 NOTE — TELEPHONE ENCOUNTER
----- Message from Prince Vance MD sent at 4/16/2020  8:08 AM CDT -----  Piper please tell Tasha that her SARS-CoV2 (COVID-19) Qualitative PCR test was negative

## 2020-04-20 ENCOUNTER — TELEPHONE (OUTPATIENT)
Dept: GASTROENTEROLOGY | Facility: CLINIC | Age: 65
End: 2020-04-20

## 2020-04-20 NOTE — TELEPHONE ENCOUNTER
----- Message from Parvin Lara MA sent at 4/17/2020  4:48 PM CDT -----      ----- Message -----  From: Rita Persaud  Sent: 4/17/2020   3:36 PM CDT  To: Pinky Saldaña -- pt is returning your call [phone]

## 2020-04-20 NOTE — TELEPHONE ENCOUNTER
Called and spoke to pt.  Pt says she has been trying to contact Dr. Vance but he hasn't returned her call.  Informed pt Dr. Vance has been in clinic and she is scheduled for virtual audio visit on tomorrow, 04/21 at 9:00 am.  Reviewed all labs and recommendations with pt.  Pt verbalized understanding and appreciated the call.

## 2020-04-21 ENCOUNTER — OFFICE VISIT (OUTPATIENT)
Dept: GASTROENTEROLOGY | Facility: CLINIC | Age: 65
End: 2020-04-21
Payer: MEDICARE

## 2020-04-21 DIAGNOSIS — R13.19 ESOPHAGEAL DYSPHAGIA: ICD-10-CM

## 2020-04-21 DIAGNOSIS — Z83.79 FAMILY HISTORY OF LIVER DISEASE: Primary | ICD-10-CM

## 2020-04-21 DIAGNOSIS — D50.9 IRON DEFICIENCY ANEMIA, UNSPECIFIED IRON DEFICIENCY ANEMIA TYPE: ICD-10-CM

## 2020-04-21 PROCEDURE — 99442 PR PHYSICIAN TELEPHONE EVALUATION 11-20 MIN: ICD-10-PCS | Mod: 95,HCNC,, | Performed by: INTERNAL MEDICINE

## 2020-04-21 PROCEDURE — 99442 PR PHYSICIAN TELEPHONE EVALUATION 11-20 MIN: CPT | Mod: 95,HCNC,, | Performed by: INTERNAL MEDICINE

## 2020-04-21 NOTE — Clinical Note
Piper please schedule Tasha for fasting labs in 4 weeks.  Order placedPlease schedule patient for telemedicine visit in 6 months.

## 2020-04-21 NOTE — PROGRESS NOTES
The patient location is: Home  The chief complaint leading to consultation is: Iron deficiency anemia and dysphaia  Visit type: Telemed phone  Total time spent with patient:20 minutes  Each patient to whom he or she provides medical services by telemedicine is:  (1) informed of the relationship between the physician and patient and the respective role of any other health care provider with respect to management of the patient; and (2) notified that he or she may decline to receive medical services by telemedicine and may withdraw from such care at any time.    Notes: see below          [facility] Consultation Note    Reason for Consult:  The encounter diagnosis was Family history of liver disease.    PCP:   Mesfin Hodges Ii       Referring MD:  No referring provider defined for this encounter.    Initial History of Present Illness (HPI):  This is a 64 y.o. female telemedicine visit patient unable to connect with video visit so telephone visit done her [facility] and was an a department health COVID-19 pandemic mandates.  Patient has iron deficiency anemia is responded well to oral iron having a bowel movement every other day no blood overt in stool intermittent solid food dysphagia for many years GERD well controlled on her PPI pantoprazole 40 mg once daily recent labs look good patient scheduled for EGD and colonoscopy for further evaluation.  Patient's COVID 19 test negative.  Patient denies fever chills headache shortness of breath chest pain no blood in her urine no blood in her stool no abdominal pain no lightheadedness or dizziness.  Nobody in her family sick.  She stay on at home with her .    Abdominal pain - no  Reflux -well controlled with PPI  Dysphagia - intermittently for solids and liquids   Bowel habits - normal  GI bleeding - none  NSAID usage - Aspirin    Interval HPI 04/21/2020:  The patient's last visit with me was on 12/17/2013.      ROS:  Constitutional: No fevers, chills,  No weight loss  ENT:  Well controlled heartburn as above dysphagia no odynophagia no hoarseness  CV: No chest pain, no palpitation  Pulm: No cough, No shortness of breath, no wheezing  Ophtho: No vision changes  GI: see HPI  Derm: No rash, no itching  Heme: No lymphadenopathy, No easy bruising  MSK: No significant arthritis  : No dysuria, No hematuria  Endo: No hot or cold intolerance  Neuro: No syncope, No seizure, no strokes  Psych: No uncontrolled anxiety, No uncontrolled depression    Medical History:  has a past medical history of Anticoagulant long-term use, Anxiety, Arthritis, Bilateral lower extremity edema, Carotid artery occlusion, Cataract, Coronary artery disease, Depression, Fever blister, Hypothyroid, Iron deficiency anemia, Lumbar radiculopathy, Ocular migraine, and Sleep apnea.    Surgical History:  has a past surgical history that includes Hysterectomy (1975); Back surgery; pain pump placement; Spine surgery (5-13-13); Cataract extraction w/  intraocular lens implant (Left); Spinal cord stimulator removal; Esophagogastroduodenoscopy (N/A, 5/23/2018); Tonsillectomy; Adenoidectomy; Cardiac catheterization (2016); Cystoscopic litholapaxy (N/A, 6/27/2019); Cystoscopic litholapaxy (N/A, 9/3/2019); and Replacement of catheter (N/A, 10/31/2019).    Family History: family history includes Cancer in her father; Cancer (age of onset: 55) in her mother; Cirrhosis in her paternal aunt and paternal uncle; Colon cancer in her maternal uncle; Esophageal cancer in her father; Heart disease in her maternal uncle; Liver disease in her paternal aunt and paternal uncle; No Known Problems in her brother, brother, maternal aunt, maternal grandfather, paternal grandfather, paternal grandmother, and sister; Parkinsonism in her maternal grandmother; Tremor in her maternal grandmother..     Social History:  reports that she has never smoked. She has never used smokeless tobacco. She reports that she does not drink alcohol  or use drugs.    Review of patient's allergies indicates:   Allergen Reactions    (d)-limonene flavor      Other reaction(s): difficult intubation  Other reaction(s): Difficulty breathing    Bactrim [sulfamethoxazole-trimethoprim] Anaphylaxis    Benadryl [diphenhydramine hcl] Shortness Of Breath    Imitrex [sumatriptan succinate] Shortness Of Breath    Topamax [topiramate] Shortness Of Breath    Vancomycin Shortness Of Breath     Rash    Butorphanol tartrate     Darvocet a500 [propoxyphene n-acetaminophen]      Other reaction(s): Difficulty breathing    Fentanyl      Other reaction(s): Vomiting  Other reaction(s): Nausea  Other reaction(s): Itching  swelling    White petrolatum-zinc     Zinc oxide-white petrolatum      Other reaction(s): Difficulty breathing    Latex, natural rubber Itching and Rash    Phenytoin Rash and Other (See Comments)     Trouble breathing       Medication List with Changes/Refills   Current Medications    ASCORBIC ACID, VITAMIN C, (VITAMIN C) 500 MG TABLET    Take 500 mg by mouth once daily.    ASPIRIN (ECOTRIN) 81 MG EC TABLET    Take 1 tablet (81 mg total) by mouth once daily.    ATORVASTATIN (LIPITOR) 80 MG TABLET    TAKE 1 TABLET BY MOUTH DAILY    BUTALBITAL-ACETAMINOPHEN-CAFFEINE -40 MG (FIORICET, ESGIC) -40 MG PER TABLET    Take 1 tablet by mouth every 4 (four) hours as needed for Headaches.     CHOLECALCIFEROL, VITAMIN D3, (VITAMIN D3) 50 MCG (2,000 UNIT) CAP    Take 1 capsule (2,000 Units total) by mouth once daily.    ERGOCALCIFEROL (ERGOCALCIFEROL) 50,000 UNIT CAP    Take 1 capsule (50,000 Units total) by mouth every 7 days. for 12 doses    FERROUS SULFATE (FEOSOL) 325 MG (65 MG IRON) TAB TABLET    Take 1 tablet (325 mg total) by mouth once daily.    FLUCONAZOLE (DIFLUCAN) 100 MG TABLET    2 tabs day 1; then 1 tab daily until gone; 7 days of treatment    FLUOXETINE 20 MG CAPSULE    Take 3 capsules (60 mg total) by mouth once daily.    FUROSEMIDE (LASIX) 40  MG TABLET    Take 3 tablets (120 mg total) by mouth once daily. Two in the morning and one in the evening    GABAPENTIN (NEURONTIN) 100 MG CAPSULE    Take 1 capsule (100 mg total) by mouth 3 (three) times daily.    HYDROCODONE-ACETAMINOPHEN (NORCO)  MG PER TABLET    Take 1 tablet by mouth every 6 (six) hours as needed for Pain.    INTRATHECAL PAIN PUMP COMPOUND    Hydromorphone (7.5 mg/mL) infusion at 3.6799 mg/day (0.1533 mg/hr) out of a total reservoir volume of 37.3 mL    LEVOTHYROXINE (SYNTHROID) 125 MCG TABLET    Take 1 tablet (125 mcg total) by mouth before breakfast.    LIDOCAINE (LIDODERM) 5 %    Place 1 patch onto the skin daily as needed (pain). USE AS DIRECTED WEAR FOR A MAXIMUM OF 12 HOURS    OXYBUTYNIN (DITROPAN XL) 15 MG TR24    Take 15 mg by mouth once daily.    OXYCODONE-ACETAMINOPHEN (PERCOCET)  MG PER TABLET        PANTOPRAZOLE (PROTONIX) 40 MG TABLET    Take 1 tablet (40 mg total) by mouth once daily.    POLYETHYLENE GLYCOL (GLYCOLAX) 17 GRAM PWPK    Take 17 g by mouth once daily.    POTASSIUM CHLORIDE SA (K-DUR,KLOR-CON) 10 MEQ TABLET        POTASSIUM CITRATE (UROCIT-K) 10 MEQ (1,080 MG) TBSR    Take 1 tablet (10 mEq total) by mouth once daily.    PROMETHAZINE (PHENERGAN) 25 MG TABLET    Take 25 mg by mouth every 6 (six) hours as needed for Nausea.    QUETIAPINE (SEROQUEL) 100 MG TAB    TAKE 1 TABLET (100 MG TOTAL) BY MOUTH EVERY EVENING.    QUETIAPINE (SEROQUEL) 25 MG TAB    Take 12.5-25 mg twice daily as needed for anxiety    SENNA (SENNA LAX) 8.6 MG TABLET    Take 2 tablets by mouth 2 (two) times daily.    TIZANIDINE (ZANAFLEX) 4 MG TABLET        TORSEMIDE (DEMADEX) 10 MG TAB    Take 1 tablet (10 mg total) by mouth once daily. for 7 days    TRAZODONE (DESYREL) 100 MG TABLET    Take 3 tablets (300 mg total) by mouth every evening.         Objective Findings:    Vital Signs:  LMP  (LMP Unknown)   There is no height or weight on file to calculate BMI.    Physical Exam:  Telemedicine  Telephone visit - unable to do video visit  Neurologic:  Alert and oriented x4  Psychiatric:  Normal speech mentation and affect    Labs:  Lab Results   Component Value Date    WBC 5.47 04/15/2020    HGB 12.8 04/15/2020    HCT 40.1 04/15/2020     04/15/2020    CHOL 122 02/21/2019    TRIG 56 02/21/2019    HDL 47 02/21/2019    ALT 9 (L) 01/31/2020    AST 18 01/31/2020     04/15/2020    K 3.4 (L) 04/15/2020    CL 97 04/15/2020    CREATININE 0.8 04/15/2020    BUN 8 04/15/2020    CO2 32 (H) 04/15/2020    TSH 6.572 (H) 01/28/2020    INR 0.9 05/27/2014    HGBA1C 5.4 08/25/2016               Medical Decision Making:  Telemedicine visit patient unable to can neck with video so telephone visit done per [facility] and [place] department of health COVID-19 pandemic mandates.  EGD and colonoscopy talk given labs reviewed.        Assessment:  1. Family history of liver disease     2.  Iron deficiency anemia responding well to iron along with intermittent solid food dysphagia recommend EGD and colonoscopy for further evaluation orders placed     Recommendations:  1.  Hepatitis C screen check immunity to hepatitis a and B.  2.  Iron deficiency anemia responded well to oral iron and intermittent solid food dysphagia recommend EGD and colonoscopy orders placed.  3.  Return to GI clinic in 6 months for follow-up    No follow-ups on file.      Order summary:  Orders Placed This Encounter    Hepatitis B Surface Antibody, Qual/Quant    HEPATITIS A ANTIBODY, IGG    Hepatitis C Antibody    Hepatitis B Surface Antigen    Hepatitis B Core Antibody, Total         Thank you so much for allowing me to participate in the care of Tasha Vance MD

## 2020-04-22 ENCOUNTER — TELEPHONE (OUTPATIENT)
Dept: NEUROLOGY | Facility: CLINIC | Age: 65
End: 2020-04-22

## 2020-04-22 ENCOUNTER — TELEPHONE (OUTPATIENT)
Dept: UROLOGY | Facility: CLINIC | Age: 65
End: 2020-04-22

## 2020-04-22 NOTE — TELEPHONE ENCOUNTER
Called [facility] at [phone], cannot accept verbal order. States pt needs new referral before a nurse can go out to patient's home.

## 2020-04-22 NOTE — TELEPHONE ENCOUNTER
----- Message from Bryanna Crowder sent at 4/22/2020 12:36 PM CDT -----  Contact: Dangelo/ [phone]  Calling in regards to speaking with nurse regarding orders for home health([facility]) to have catheter changed. Please call

## 2020-04-23 DIAGNOSIS — Z93.59 CHRONIC SUPRAPUBIC CATHETER: Primary | ICD-10-CM

## 2020-04-23 DIAGNOSIS — R33.9 URINARY RETENTION: ICD-10-CM

## 2020-04-24 ENCOUNTER — TELEPHONE (OUTPATIENT)
Dept: NEUROLOGY | Facility: CLINIC | Age: 65
End: 2020-04-24

## 2020-04-24 ENCOUNTER — TELEPHONE (OUTPATIENT)
Dept: UROLOGY | Facility: CLINIC | Age: 65
End: 2020-04-24

## 2020-04-24 NOTE — TELEPHONE ENCOUNTER
----- Message from Benny Mcgill sent at 4/23/2020 11:34 AM CDT -----  Contact: Patient @ [phone]  Patient returning a missed call, pls return call

## 2020-04-24 NOTE — TELEPHONE ENCOUNTER
----- Message from Barbara Smith RN sent at 4/23/2020  6:20 PM CDT -----  Contact: pt: [phone]      ----- Message -----  From: Sis Calixto  Sent: 4/23/2020   4:29 PM CDT  To: Maria Esther Barraza- pt is calling to speak with you state she has another UTI, has pressure, urine has a odor and bacteria       Please contact pt: [phone]

## 2020-04-24 NOTE — TELEPHONE ENCOUNTER
Spoke to the patient. She doesn't want to come to the clinic at this time. Will call back to reschedule.

## 2020-04-24 NOTE — TELEPHONE ENCOUNTER
Called [facility] office at ph# [phone]. Notified that pt needs to be seen for sptube change as she is overdue. Asked that I fax referral to their intaking office since it does not show approved HH in their system. Referral faxed to [facility] intake dept at fax# [phone].

## 2020-04-28 ENCOUNTER — TELEPHONE (OUTPATIENT)
Dept: CARDIOLOGY | Facility: CLINIC | Age: 65
End: 2020-04-28

## 2020-04-28 PROCEDURE — G0180 MD CERTIFICATION HHA PATIENT: HCPCS | Mod: ,,, | Performed by: UROLOGY

## 2020-04-28 PROCEDURE — G0180 PR HOME HEALTH MD CERTIFICATION: ICD-10-PCS | Mod: ,,, | Performed by: UROLOGY

## 2020-04-28 NOTE — TELEPHONE ENCOUNTER
----- Message from Sam Youssef MA sent at 4/28/2020 10:05 AM CDT -----  Contact: Pt Called  Please call Ms. Hawley at [phone].  She would like to speak to you in reference to getting Home Health to come to her home for Therapy and get her legs wrapped which is requested by Dr. Haney.    Thank You,  Sandra

## 2020-04-28 NOTE — TELEPHONE ENCOUNTER
Pt notified, verbalized understanding. Brent at [facility]([phone]) stated that she will speak w/Saray and see what the pt needs (PT and type of leg wraps) and call me back. Brent stated that they will need a note from Dr. Haney stating that they can do the leg wraps and PT for pt.

## 2020-04-28 NOTE — TELEPHONE ENCOUNTER
Pt called asking if she can get an order from you stating that she can have home PT and have her legs wrapped by [facility]. Pt stated that she has a suprapubic tube that [facility] comes out and changes for her and they told her that they can also do PT and wrap her legs. Please advise.

## 2020-04-29 NOTE — TELEPHONE ENCOUNTER
Talked with Mrs. Hawley and answered all questions.  Pt will need specific lymphedema clinic evaluation and therapy.  Will refer to lymphedema clinic after COVID 19 precautions are lifted.

## 2020-04-29 NOTE — TELEPHONE ENCOUNTER
Saray from [facility] called stating that they can do Unnaboots on the pt a couple times a week and can have pt evaluated for  PT. Saray stated that they will need a signed note from Dr. Haney stating that pt can have PT and Unnaboots and how often he wants pt to have PT and Unnaboots faxed to [phone]. Please advise.

## 2020-05-04 ENCOUNTER — TELEPHONE (OUTPATIENT)
Dept: UROLOGY | Facility: CLINIC | Age: 65
End: 2020-05-04

## 2020-05-04 NOTE — TELEPHONE ENCOUNTER
Pt c/o gross hematuria, leaking, bladder pressure/pain, and sediment build up since Friday, 5/1/2020. Denies f/c/n/v. States catheter was last changed on Tues,  4/28/2020.

## 2020-05-04 NOTE — TELEPHONE ENCOUNTER
----- Message from Bryanna Crowder sent at 5/4/2020 12:59 PM CDT -----  Contact: [phone]  Calling in regards to speaking with Christi regarding possible UTI. Please call

## 2020-05-05 NOTE — TELEPHONE ENCOUNTER
Called [facility] at [phone], gave v/o to Brent () for urine specimen for culture to be collected from new catheter.

## 2020-05-05 NOTE — TELEPHONE ENCOUNTER
This message routed to Camilla Domingo MA to get pt set up in Lymphedema Clinic once COVID restrictions are lifted per Dr. Haney.

## 2020-05-06 ENCOUNTER — DOCUMENT SCAN (OUTPATIENT)
Dept: HOME HEALTH SERVICES | Facility: HOSPITAL | Age: 65
End: 2020-05-06
Payer: MEDICAID

## 2020-05-07 ENCOUNTER — TELEPHONE (OUTPATIENT)
Dept: UROLOGY | Facility: CLINIC | Age: 65
End: 2020-05-07

## 2020-05-07 ENCOUNTER — EXTERNAL HOME HEALTH (OUTPATIENT)
Dept: HOME HEALTH SERVICES | Facility: HOSPITAL | Age: 65
End: 2020-05-07
Payer: MEDICARE

## 2020-05-07 DIAGNOSIS — N30.90 BLADDER INFECTION: Primary | ICD-10-CM

## 2020-05-07 RX ORDER — AMOXICILLIN AND CLAVULANATE POTASSIUM 500; 125 MG/1; MG/1
1 TABLET, FILM COATED ORAL 3 TIMES DAILY
Qty: 21 TABLET | Refills: 0 | Status: ON HOLD | OUTPATIENT
Start: 2020-05-07 | End: 2020-05-17 | Stop reason: HOSPADM

## 2020-05-07 NOTE — TELEPHONE ENCOUNTER
----- Message from Zoe Cheng sent at 5/7/2020 11:41 AM CDT -----  Contact: pt at [phone] or [phone]  Maria Esther pt-Pt is having al lot of discomfort,pain and pressure.  Please call and advise.  Home health cam Tuesday May 5 and took a specimen and changed her tube.  Is asking for the results also. Please call pt today.

## 2020-05-07 NOTE — TELEPHONE ENCOUNTER
Left message on cell voice mail.  Augmentin was sent to [facility] pharmacy.  Culture results are not back.  May have to change the abx.

## 2020-05-08 ENCOUNTER — TELEPHONE (OUTPATIENT)
Dept: UROLOGY | Facility: CLINIC | Age: 65
End: 2020-05-08

## 2020-05-08 NOTE — TELEPHONE ENCOUNTER
Called and spoke to Mrs. Hawley and her daughter Lisa.  The patient is presently in [place].     She is not having fever but is having suprapubic pressure.  The catheter was changed at the time of urine collection.   Discussed that she has MRSA, unfortunately, she has allergies to both Bactrim and vancomycin.  She is not having fevers, chills or confusion.    Lisa will watch her and she took down the information about the history we have for the patient's allergies and th MRSA sensitivities.     If she gets sick over the weekend she will take her in to the hospital.  We also discussed that the patient was tested for penicillin allergy which she did not have but has not been tested for the sulfonamide allergy or bactrim allergy.

## 2020-05-11 ENCOUNTER — TELEPHONE (OUTPATIENT)
Dept: UROLOGY | Facility: CLINIC | Age: 65
End: 2020-05-11

## 2020-05-11 NOTE — TELEPHONE ENCOUNTER
----- Message from Sis Calixto sent at 5/11/2020  9:18 AM CDT -----  Contact: pt: [phone]  Pt is calling re urine culture results       Vandana contact pt: [phone]

## 2020-05-11 NOTE — TELEPHONE ENCOUNTER
----- Message from Barbara Smith RN sent at 5/8/2020  4:12 PM CDT -----  Contact: [phone]      ----- Message -----  From: Bryanna Crowder  Sent: 5/8/2020   2:13 PM CDT  To: Maria Esther Iyer Staff    Calling in regards to speaking with Christi or doctor for urine culture results. Please call

## 2020-05-11 NOTE — TELEPHONE ENCOUNTER
----- Message from Barbara Smith RN sent at 5/8/2020  4:12 PM CDT -----  Contact: [phone]      ----- Message -----  From: Bryanna Crowder  Sent: 5/8/2020   2:13 PM CDT  To: Maria Esther Iyer Staff    Calling in regards to speaking with Christi or doctor for urine culture results. Please call

## 2020-05-12 ENCOUNTER — HOSPITAL ENCOUNTER (INPATIENT)
Facility: HOSPITAL | Age: 65
LOS: 5 days | Discharge: HOME HEALTH | DRG: 699 | End: 2020-05-17
Attending: EMERGENCY MEDICINE | Admitting: INTERNAL MEDICINE
Payer: MEDICARE

## 2020-05-12 DIAGNOSIS — R07.9 CHEST PAIN: ICD-10-CM

## 2020-05-12 DIAGNOSIS — N39.0 URINARY TRACT INFECTION ASSOCIATED WITH CYSTOSTOMY CATHETER: ICD-10-CM

## 2020-05-12 DIAGNOSIS — R00.1 BRADYCARDIA: ICD-10-CM

## 2020-05-12 DIAGNOSIS — N39.0 URINARY TRACT INFECTION ASSOCIATED WITH CYSTOSTOMY CATHETER, INITIAL ENCOUNTER: Primary | ICD-10-CM

## 2020-05-12 DIAGNOSIS — T83.510A URINARY TRACT INFECTION ASSOCIATED WITH CYSTOSTOMY CATHETER: ICD-10-CM

## 2020-05-12 DIAGNOSIS — I38 ENDOCARDITIS: ICD-10-CM

## 2020-05-12 DIAGNOSIS — I25.10 CAD (CORONARY ARTERY DISEASE): ICD-10-CM

## 2020-05-12 DIAGNOSIS — T83.510A URINARY TRACT INFECTION ASSOCIATED WITH CYSTOSTOMY CATHETER, INITIAL ENCOUNTER: Primary | ICD-10-CM

## 2020-05-12 LAB
ALBUMIN SERPL BCP-MCNC: 3.6 G/DL (ref 3.5–5.2)
ALP SERPL-CCNC: 99 U/L (ref 55–135)
ALT SERPL W/O P-5'-P-CCNC: 5 U/L (ref 10–44)
ANION GAP SERPL CALC-SCNC: 7 MMOL/L (ref 8–16)
AST SERPL-CCNC: 12 U/L (ref 10–40)
BACTERIA #/AREA URNS AUTO: ABNORMAL /HPF
BASOPHILS # BLD AUTO: 0.02 K/UL (ref 0–0.2)
BASOPHILS NFR BLD: 0.4 % (ref 0–1.9)
BILIRUB SERPL-MCNC: 0.4 MG/DL (ref 0.1–1)
BILIRUB UR QL STRIP: NEGATIVE
BUN SERPL-MCNC: 7 MG/DL (ref 8–23)
CALCIUM SERPL-MCNC: 9.1 MG/DL (ref 8.7–10.5)
CHLORIDE SERPL-SCNC: 101 MMOL/L (ref 95–110)
CLARITY UR REFRACT.AUTO: ABNORMAL
CO2 SERPL-SCNC: 33 MMOL/L (ref 23–29)
COLOR UR AUTO: ABNORMAL
CREAT SERPL-MCNC: 0.8 MG/DL (ref 0.5–1.4)
DIFFERENTIAL METHOD: ABNORMAL
EOSINOPHIL # BLD AUTO: 0.1 K/UL (ref 0–0.5)
EOSINOPHIL NFR BLD: 2 % (ref 0–8)
ERYTHROCYTE [DISTWIDTH] IN BLOOD BY AUTOMATED COUNT: 13.8 % (ref 11.5–14.5)
EST. GFR  (AFRICAN AMERICAN): >60 ML/MIN/1.73 M^2
EST. GFR  (NON AFRICAN AMERICAN): >60 ML/MIN/1.73 M^2
GLUCOSE SERPL-MCNC: 90 MG/DL (ref 70–110)
GLUCOSE UR QL STRIP: NEGATIVE
HCT VFR BLD AUTO: 37.4 % (ref 37–48.5)
HGB BLD-MCNC: 11.5 G/DL (ref 12–16)
HGB UR QL STRIP: ABNORMAL
HYALINE CASTS UR QL AUTO: 0 /LPF
IMM GRANULOCYTES # BLD AUTO: 0.02 K/UL (ref 0–0.04)
IMM GRANULOCYTES NFR BLD AUTO: 0.4 % (ref 0–0.5)
KETONES UR QL STRIP: NEGATIVE
LEUKOCYTE ESTERASE UR QL STRIP: ABNORMAL
LYMPHOCYTES # BLD AUTO: 1.5 K/UL (ref 1–4.8)
LYMPHOCYTES NFR BLD: 27 % (ref 18–48)
MCH RBC QN AUTO: 27.6 PG (ref 27–31)
MCHC RBC AUTO-ENTMCNC: 30.7 G/DL (ref 32–36)
MCV RBC AUTO: 90 FL (ref 82–98)
MICROSCOPIC COMMENT: ABNORMAL
MONOCYTES # BLD AUTO: 0.5 K/UL (ref 0.3–1)
MONOCYTES NFR BLD: 9.7 % (ref 4–15)
NEUTROPHILS # BLD AUTO: 3.3 K/UL (ref 1.8–7.7)
NEUTROPHILS NFR BLD: 60.5 % (ref 38–73)
NITRITE UR QL STRIP: POSITIVE
NRBC BLD-RTO: 0 /100 WBC
PH UR STRIP: 6 [PH] (ref 5–8)
PLATELET # BLD AUTO: 186 K/UL (ref 150–350)
PMV BLD AUTO: 10.3 FL (ref 9.2–12.9)
POTASSIUM SERPL-SCNC: 3.3 MMOL/L (ref 3.5–5.1)
PROT SERPL-MCNC: 6.8 G/DL (ref 6–8.4)
PROT UR QL STRIP: ABNORMAL
RBC # BLD AUTO: 4.16 M/UL (ref 4–5.4)
RBC #/AREA URNS AUTO: 22 /HPF (ref 0–4)
SARS-COV-2 RDRP RESP QL NAA+PROBE: NEGATIVE
SODIUM SERPL-SCNC: 141 MMOL/L (ref 136–145)
SP GR UR STRIP: 1.01 (ref 1–1.03)
URN SPEC COLLECT METH UR: ABNORMAL
WBC # BLD AUTO: 5.44 K/UL (ref 3.9–12.7)
WBC #/AREA URNS AUTO: >100 /HPF (ref 0–5)

## 2020-05-12 PROCEDURE — 25000003 PHARM REV CODE 250: Mod: HCNC | Performed by: HOSPITALIST

## 2020-05-12 PROCEDURE — 25000003 PHARM REV CODE 250: Mod: HCNC | Performed by: PHYSICIAN ASSISTANT

## 2020-05-12 PROCEDURE — 93010 ELECTROCARDIOGRAM REPORT: CPT | Mod: HCNC,,, | Performed by: INTERNAL MEDICINE

## 2020-05-12 PROCEDURE — 25000003 PHARM REV CODE 250: Mod: HCNC | Performed by: EMERGENCY MEDICINE

## 2020-05-12 PROCEDURE — 99284 PR EMERGENCY DEPT VISIT,LEVEL IV: ICD-10-PCS | Mod: ,,, | Performed by: EMERGENCY MEDICINE

## 2020-05-12 PROCEDURE — 99223 PR INITIAL HOSPITAL CARE,LEVL III: ICD-10-PCS | Mod: HCNC,AI,, | Performed by: HOSPITALIST

## 2020-05-12 PROCEDURE — 87086 URINE CULTURE/COLONY COUNT: CPT | Mod: HCNC

## 2020-05-12 PROCEDURE — 99285 EMERGENCY DEPT VISIT HI MDM: CPT | Mod: 25,HCNC

## 2020-05-12 PROCEDURE — 81001 URINALYSIS AUTO W/SCOPE: CPT | Mod: HCNC

## 2020-05-12 PROCEDURE — 93005 ELECTROCARDIOGRAM TRACING: CPT | Mod: HCNC

## 2020-05-12 PROCEDURE — 96365 THER/PROPH/DIAG IV INF INIT: CPT | Mod: HCNC

## 2020-05-12 PROCEDURE — U0002 COVID-19 LAB TEST NON-CDC: HCPCS | Mod: HCNC

## 2020-05-12 PROCEDURE — 11000001 HC ACUTE MED/SURG PRIVATE ROOM: Mod: HCNC

## 2020-05-12 PROCEDURE — 99223 1ST HOSP IP/OBS HIGH 75: CPT | Mod: HCNC,AI,, | Performed by: HOSPITALIST

## 2020-05-12 PROCEDURE — 93010 EKG 12-LEAD: ICD-10-PCS | Mod: HCNC,,, | Performed by: INTERNAL MEDICINE

## 2020-05-12 PROCEDURE — 80053 COMPREHEN METABOLIC PANEL: CPT | Mod: HCNC

## 2020-05-12 PROCEDURE — 85025 COMPLETE CBC W/AUTO DIFF WBC: CPT | Mod: HCNC

## 2020-05-12 PROCEDURE — 63600175 PHARM REV CODE 636 W HCPCS: Mod: HCNC | Performed by: HOSPITALIST

## 2020-05-12 PROCEDURE — 99284 EMERGENCY DEPT VISIT MOD MDM: CPT | Mod: ,,, | Performed by: EMERGENCY MEDICINE

## 2020-05-12 PROCEDURE — 63600175 PHARM REV CODE 636 W HCPCS: Mod: HCNC | Performed by: EMERGENCY MEDICINE

## 2020-05-12 RX ORDER — PROCHLORPERAZINE EDISYLATE 5 MG/ML
5 INJECTION INTRAMUSCULAR; INTRAVENOUS EVERY 6 HOURS PRN
Status: DISCONTINUED | OUTPATIENT
Start: 2020-05-12 | End: 2020-05-17 | Stop reason: HOSPADM

## 2020-05-12 RX ORDER — ALPRAZOLAM 0.25 MG/1
0.25 TABLET ORAL ONCE
Status: COMPLETED | OUTPATIENT
Start: 2020-05-12 | End: 2020-05-12

## 2020-05-12 RX ORDER — ATORVASTATIN CALCIUM 20 MG/1
80 TABLET, FILM COATED ORAL DAILY
Status: DISCONTINUED | OUTPATIENT
Start: 2020-05-13 | End: 2020-05-17 | Stop reason: HOSPADM

## 2020-05-12 RX ORDER — FLUOXETINE HYDROCHLORIDE 20 MG/1
60 CAPSULE ORAL DAILY
Status: DISCONTINUED | OUTPATIENT
Start: 2020-05-13 | End: 2020-05-17 | Stop reason: HOSPADM

## 2020-05-12 RX ORDER — ENOXAPARIN SODIUM 100 MG/ML
40 INJECTION SUBCUTANEOUS EVERY 24 HOURS
Status: DISCONTINUED | OUTPATIENT
Start: 2020-05-12 | End: 2020-05-17 | Stop reason: HOSPADM

## 2020-05-12 RX ORDER — ONDANSETRON 2 MG/ML
4 INJECTION INTRAMUSCULAR; INTRAVENOUS EVERY 8 HOURS PRN
Status: DISCONTINUED | OUTPATIENT
Start: 2020-05-12 | End: 2020-05-17 | Stop reason: HOSPADM

## 2020-05-12 RX ORDER — POLYETHYLENE GLYCOL 3350 17 G/17G
17 POWDER, FOR SOLUTION ORAL DAILY
Status: DISCONTINUED | OUTPATIENT
Start: 2020-05-13 | End: 2020-05-17 | Stop reason: HOSPADM

## 2020-05-12 RX ORDER — IBUPROFEN 200 MG
24 TABLET ORAL
Status: DISCONTINUED | OUTPATIENT
Start: 2020-05-12 | End: 2020-05-17 | Stop reason: HOSPADM

## 2020-05-12 RX ORDER — TRAZODONE HYDROCHLORIDE 100 MG/1
300 TABLET ORAL NIGHTLY
Status: DISCONTINUED | OUTPATIENT
Start: 2020-05-12 | End: 2020-05-17 | Stop reason: HOSPADM

## 2020-05-12 RX ORDER — SODIUM CHLORIDE 0.9 % (FLUSH) 0.9 %
10 SYRINGE (ML) INJECTION
Status: DISCONTINUED | OUTPATIENT
Start: 2020-05-12 | End: 2020-05-17 | Stop reason: HOSPADM

## 2020-05-12 RX ORDER — ACETAMINOPHEN 325 MG/1
650 TABLET ORAL EVERY 4 HOURS PRN
Status: DISCONTINUED | OUTPATIENT
Start: 2020-05-12 | End: 2020-05-12

## 2020-05-12 RX ORDER — POTASSIUM CHLORIDE 20 MEQ/1
40 TABLET, EXTENDED RELEASE ORAL ONCE
Status: COMPLETED | OUTPATIENT
Start: 2020-05-13 | End: 2020-05-12

## 2020-05-12 RX ORDER — LINEZOLID 2 MG/ML
600 INJECTION, SOLUTION INTRAVENOUS
Status: COMPLETED | OUTPATIENT
Start: 2020-05-12 | End: 2020-05-12

## 2020-05-12 RX ORDER — SODIUM CHLORIDE 0.9 % (FLUSH) 0.9 %
10 SYRINGE (ML) INJECTION
Status: CANCELLED | OUTPATIENT
Start: 2020-05-12

## 2020-05-12 RX ORDER — FUROSEMIDE 40 MG/1
80 TABLET ORAL DAILY
Status: DISCONTINUED | OUTPATIENT
Start: 2020-05-13 | End: 2020-05-16

## 2020-05-12 RX ORDER — GABAPENTIN 100 MG/1
100 CAPSULE ORAL 3 TIMES DAILY
Status: DISCONTINUED | OUTPATIENT
Start: 2020-05-12 | End: 2020-05-12

## 2020-05-12 RX ORDER — TRAZODONE HYDROCHLORIDE 100 MG/1
300 TABLET ORAL NIGHTLY
Status: DISCONTINUED | OUTPATIENT
Start: 2020-05-12 | End: 2020-05-12

## 2020-05-12 RX ORDER — LEVOTHYROXINE SODIUM 125 UG/1
125 TABLET ORAL
Status: DISCONTINUED | OUTPATIENT
Start: 2020-05-13 | End: 2020-05-17 | Stop reason: HOSPADM

## 2020-05-12 RX ORDER — OXYBUTYNIN CHLORIDE 15 MG/1
15 TABLET, EXTENDED RELEASE ORAL DAILY
Status: DISCONTINUED | OUTPATIENT
Start: 2020-05-13 | End: 2020-05-17 | Stop reason: HOSPADM

## 2020-05-12 RX ORDER — SENNOSIDES 8.6 MG/1
8.6 TABLET ORAL DAILY PRN
Status: DISCONTINUED | OUTPATIENT
Start: 2020-05-12 | End: 2020-05-17 | Stop reason: HOSPADM

## 2020-05-12 RX ORDER — LINEZOLID 2 MG/ML
600 INJECTION, SOLUTION INTRAVENOUS
Status: DISCONTINUED | OUTPATIENT
Start: 2020-05-13 | End: 2020-05-13

## 2020-05-12 RX ORDER — HYDROCODONE BITARTRATE AND ACETAMINOPHEN 10; 325 MG/1; MG/1
1 TABLET ORAL EVERY 4 HOURS PRN
Status: DISCONTINUED | OUTPATIENT
Start: 2020-05-12 | End: 2020-05-16

## 2020-05-12 RX ORDER — PANTOPRAZOLE SODIUM 40 MG/1
40 TABLET, DELAYED RELEASE ORAL DAILY
Status: DISCONTINUED | OUTPATIENT
Start: 2020-05-13 | End: 2020-05-17 | Stop reason: HOSPADM

## 2020-05-12 RX ORDER — ASPIRIN 81 MG/1
81 TABLET ORAL DAILY
Status: DISCONTINUED | OUTPATIENT
Start: 2020-05-13 | End: 2020-05-17 | Stop reason: HOSPADM

## 2020-05-12 RX ORDER — IBUPROFEN 200 MG
16 TABLET ORAL
Status: DISCONTINUED | OUTPATIENT
Start: 2020-05-12 | End: 2020-05-17 | Stop reason: HOSPADM

## 2020-05-12 RX ORDER — TALC
6 POWDER (GRAM) TOPICAL NIGHTLY PRN
Status: DISCONTINUED | OUTPATIENT
Start: 2020-05-12 | End: 2020-05-17 | Stop reason: HOSPADM

## 2020-05-12 RX ORDER — QUETIAPINE FUMARATE 25 MG/1
100 TABLET, FILM COATED ORAL NIGHTLY
Status: DISCONTINUED | OUTPATIENT
Start: 2020-05-12 | End: 2020-05-17 | Stop reason: HOSPADM

## 2020-05-12 RX ORDER — HYDROCODONE BITARTRATE AND ACETAMINOPHEN 10; 325 MG/1; MG/1
1 TABLET ORAL
Status: COMPLETED | OUTPATIENT
Start: 2020-05-12 | End: 2020-05-12

## 2020-05-12 RX ORDER — FUROSEMIDE 40 MG/1
120 TABLET ORAL DAILY
Status: DISCONTINUED | OUTPATIENT
Start: 2020-05-13 | End: 2020-05-12

## 2020-05-12 RX ORDER — IPRATROPIUM BROMIDE AND ALBUTEROL SULFATE 2.5; .5 MG/3ML; MG/3ML
3 SOLUTION RESPIRATORY (INHALATION) EVERY 6 HOURS PRN
Status: DISCONTINUED | OUTPATIENT
Start: 2020-05-12 | End: 2020-05-17 | Stop reason: HOSPADM

## 2020-05-12 RX ADMIN — HYDROCODONE BITARTRATE AND ACETAMINOPHEN 1 TABLET: 10; 325 TABLET ORAL at 07:05

## 2020-05-12 RX ADMIN — LINEZOLID 600 MG: 600 INJECTION, SOLUTION INTRAVENOUS at 07:05

## 2020-05-12 RX ADMIN — ALPRAZOLAM 0.25 MG: 0.25 TABLET ORAL at 11:05

## 2020-05-12 RX ADMIN — POTASSIUM CHLORIDE 40 MEQ: 1500 TABLET, EXTENDED RELEASE ORAL at 11:05

## 2020-05-12 RX ADMIN — ENOXAPARIN SODIUM 40 MG: 100 INJECTION SUBCUTANEOUS at 10:05

## 2020-05-12 RX ADMIN — QUETIAPINE FUMARATE 100 MG: 25 TABLET ORAL at 09:05

## 2020-05-12 RX ADMIN — TRAZODONE HYDROCHLORIDE 300 MG: 100 TABLET ORAL at 11:05

## 2020-05-12 NOTE — TELEPHONE ENCOUNTER
----- Message from Ernesto Paredes sent at 5/12/2020  1:09 PM CDT -----  Contact: Pt    The Pt would like for Christi or someone to call her back asap.  The Pt is trying to find out what she needs to do to go to the ED.    Phone # [phone]

## 2020-05-12 NOTE — ED PROVIDER NOTES
Encounter Date: 5/12/2020       History     Chief Complaint   Patient presents with    Abnormal Lab     my dr called and told me to come to er for uti, staph, has suprapubic cath     Tasha Hawley is a 64 F history of hypertension, CVA, neurogenic bladder with suprapubic cath sent for admission for MRSA (+) urine culture from 5/5/2020.  Sensitive to vanc and bactrim but patient has allergies to both.   She has been have been persistent suprapubic pressure for the past 2-3 weeks wthi associated cloudy urine, was seen by home health nurse who changed her catheter and obtained a urine culture.  She started augmentin pending UC.  She was contacted by Urology with a positive result, was unfortunately in [place] was not able to get to the hospital today.  She states she has overall felt fatigued, weak, low-grade fevers for the several days.  No COVID contacts or symptoms        Review of patient's allergies indicates:   Allergen Reactions    (d)-limonene flavor      Other reaction(s): difficult intubation  Other reaction(s): Difficulty breathing    Bactrim [sulfamethoxazole-trimethoprim] Anaphylaxis    Benadryl [diphenhydramine hcl] Shortness Of Breath    Imitrex [sumatriptan succinate] Shortness Of Breath    Topamax [topiramate] Shortness Of Breath    Vancomycin Shortness Of Breath     Rash    Butorphanol tartrate     Darvocet a500 [propoxyphene n-acetaminophen]      Other reaction(s): Difficulty breathing    Fentanyl      Other reaction(s): Vomiting  Other reaction(s): Nausea  Other reaction(s): Itching  swelling    White petrolatum-zinc     Zinc oxide-white petrolatum      Other reaction(s): Difficulty breathing    Latex, natural rubber Itching and Rash    Phenytoin Rash and Other (See Comments)     Trouble breathing     Past Medical History:   Diagnosis Date    Anticoagulant long-term use     Anxiety     Arthritis     Bilateral lower extremity edema     severe chronic    Carotid artery  occlusion     Cataract     Coronary artery disease     subtotalled LAD with collateral    Depression     Fever blister     Hypothyroid     Iron deficiency anemia     Lumbar radiculopathy     with chronic pain    Ocular migraine     Sleep apnea     cpap     Past Surgical History:   Procedure Laterality Date    ADENOIDECTOMY      BACK SURGERY      x 12    CARDIAC CATHETERIZATION  2016    subtotalled LAD with right to left collaterals    CATARACT EXTRACTION W/  INTRAOCULAR LENS IMPLANT Left     Dr Coleman     CYSTOSCOPIC LITHOLAPAXY N/A 6/27/2019    Procedure: CYSTOLITHOLAPAXY;  Surgeon: Shireen Mayo MD;  Location: [facility] OR 2ND FLR;  Service: Urology;  Laterality: N/A;    CYSTOSCOPIC LITHOLAPAXY N/A 9/3/2019    Procedure: CYSTOLITHOLAPAXY;  Surgeon: Shireen Mayo MD;  Location: [facility] OR 2ND FLR;  Service: Urology;  Laterality: N/A;    ESOPHAGOGASTRODUODENOSCOPY N/A 5/23/2018    Procedure: ESOPHAGOGASTRODUODENOSCOPY (EGD);  Surgeon: Prince Vance MD;  Location: [facility] (4TH FLR);  Service: Endoscopy;  Laterality: N/A;  r/s 'd per Dr. Vance due to family emergency- ER    HYSTERECTOMY  1975    endometriosis    pain pump placement      SQ Dilaudid Pump managed by Dr. Hillman, Pain Management    REPLACEMENT OF CATHETER N/A 10/31/2019    Procedure: REPLACEMENT, CATHETER-SUPRAPUBIC;  Surgeon: Shireen Mayo MD;  Location: [facility] OR 1ST FLR;  Service: Urology;  Laterality: N/A;    SPINAL CORD STIMULATOR REMOVAL      before [place]    SPINE SURGERY  5-13-13    CERVICAL FUSION    TONSILLECTOMY       Family History   Problem Relation Age of Onset    Cancer Mother 55        breast    Cancer Father         esophagus,had laryngectomy    Esophageal cancer Father     Parkinsonism Maternal Grandmother     Tremor Maternal Grandmother     No Known Problems Brother     No Known Problems Brother     Heart disease Maternal Uncle     Colon cancer Maternal Uncle         Less than 60    No Known  Problems Sister     No Known Problems Maternal Aunt     Cirrhosis Paternal Aunt         ETOH    Liver disease Paternal Aunt         ETOH    Liver disease Paternal Uncle         ETOH    Cirrhosis Paternal Uncle         ETOH    No Known Problems Maternal Grandfather     No Known Problems Paternal Grandmother     No Known Problems Paternal Grandfather     Melanoma Neg Hx     Amblyopia Neg Hx     Blindness Neg Hx     Cataracts Neg Hx     Diabetes Neg Hx     Glaucoma Neg Hx     Hypertension Neg Hx     Macular degeneration Neg Hx     Retinal detachment Neg Hx     Strabismus Neg Hx     Stroke Neg Hx     Thyroid disease Neg Hx     Celiac disease Neg Hx     Colon polyps Neg Hx     Cystic fibrosis Neg Hx     Crohn's disease Neg Hx     Inflammatory bowel disease Neg Hx     Liver cancer Neg Hx     Rectal cancer Neg Hx     Stomach cancer Neg Hx     Ulcerative colitis Neg Hx     Lymphoma Neg Hx      Social History     Tobacco Use    Smoking status: Never Smoker    Smokeless tobacco: Never Used   Substance Use Topics    Alcohol use: Never     Frequency: Never    Drug use: No     Review of Systems   Constitutional: Positive for fever. Negative for chills.   HENT: Negative for congestion and sore throat.    Respiratory: Negative for cough and shortness of breath.    Cardiovascular: Negative for chest pain and leg swelling.   Gastrointestinal: Positive for abdominal pain (suprapubic). Negative for nausea and vomiting.   Genitourinary: Negative for dysuria and hematuria.        (+) suprapubic cath   Musculoskeletal: Positive for back pain (chronic back pain).   Skin: Negative for pallor.   Neurological: Positive for weakness.   Psychiatric/Behavioral: Negative for confusion.       Physical Exam     Initial Vitals [05/12/20 1724]   BP Pulse Resp Temp SpO2   (!) 107/55 (!) 54 18 98 °F (36.7 °C) 98 %      MAP       --         Physical Exam    Nursing note and vitals reviewed.  Constitutional: She appears  well-developed and well-nourished. No distress.   HENT:   Mouth/Throat: Oropharynx is clear and moist.   Eyes: Conjunctivae are normal. No scleral icterus.   Neck: Neck supple. No JVD present.   Cardiovascular: Normal rate, regular rhythm and intact distal pulses.   Pulmonary/Chest: Breath sounds normal. She has no wheezes. She has no rales.   Abdominal: Soft. Bowel sounds are normal. There is tenderness.   Suprapubic catheter with surrounding bloody drainage.  Mild tenderness, good UOP   Musculoskeletal: She exhibits edema.   Neurological: She is alert and oriented to person, place, and time.   Skin: Skin is warm. No rash noted.         ED Course   Procedures  Labs Reviewed   CBC W/ AUTO DIFFERENTIAL - Abnormal; Notable for the following components:       Result Value    Hemoglobin 11.5 (*)     Mean Corpuscular Hemoglobin Conc 30.7 (*)     All other components within normal limits   COMPREHENSIVE METABOLIC PANEL - Abnormal; Notable for the following components:    Potassium 3.3 (*)     CO2 33 (*)     BUN, Bld 7 (*)     ALT 5 (*)     Anion Gap 7 (*)     All other components within normal limits   SARS-COV-2 RNA AMPLIFICATION, QUAL    Narrative:     What symptom criteria does the patient meet?->Other (specify)  Please specify:->admission   URINALYSIS, REFLEX TO URINE CULTURE          Imaging Results    None          Medical Decision Making:   History:   Old Medical Records: I decided to obtain old medical records.  Old Records Summarized: records from clinic visits.       <> Summary of Records: Summarized above  Initial Assessment:   Urgent evaluation of 64-year-old female history of neurogenic bladder w suprapubic catheter here with MRSA positive culture.  The patient has allergies to both Bactrim and vancomycin (anaphylaxis and sob/rash).  Still symptomatic.   Differential Diagnosis:   Complicated UTI, CARITO, electrolyte derangement, pyelonephritis  Clinical Tests:   Lab Tests: Ordered and Reviewed  ED  Management:  Discussed with ED pharmacist treatment options, recommended Zyvox.  Order placed for Infectious disease consult.     Urology aware, recommending admission to medicine for abx.        Discussed with hospital medicine, given recent cath exchange, will hold on catheter exchange until antibiotics and improvement                                   Clinical Impression:       ICD-10-CM ICD-9-CM   1. Urinary tract infection associated with cystostomy catheter, initial encounter T83.510A 996.64    N39.0 599.0             ED Disposition Condition    Admit                           Kimmie Nassar MD  05/12/20 2028

## 2020-05-12 NOTE — PROVIDER PROGRESS NOTES - EMERGENCY DEPT.
Emergency Department TeleTRIAGE Encounter Note      CHIEF COMPLAINT    Chief Complaint   Patient presents with    Abnormal Lab     my dr called and told me to come to er for uti, staph, has suprapubic cath       VITAL SIGNS   Initial Vitals [05/12/20 1724]   BP Pulse Resp Temp SpO2   (!) 107/55 (!) 54 18 98 °F (36.7 °C) 98 %      MAP       --            ALLERGIES    Review of patient's allergies indicates:   Allergen Reactions    (d)-limonene flavor      Other reaction(s): difficult intubation  Other reaction(s): Difficulty breathing    Bactrim [sulfamethoxazole-trimethoprim] Anaphylaxis    Benadryl [diphenhydramine hcl] Shortness Of Breath    Imitrex [sumatriptan succinate] Shortness Of Breath    Topamax [topiramate] Shortness Of Breath    Vancomycin Shortness Of Breath     Rash    Butorphanol tartrate     Darvocet a500 [propoxyphene n-acetaminophen]      Other reaction(s): Difficulty breathing    Fentanyl      Other reaction(s): Vomiting  Other reaction(s): Nausea  Other reaction(s): Itching  swelling    White petrolatum-zinc     Zinc oxide-white petrolatum      Other reaction(s): Difficulty breathing    Latex, natural rubber Itching and Rash    Phenytoin Rash and Other (See Comments)     Trouble breathing       PROVIDER TRIAGE NOTE  Patient with past medical history anxiety, CAD, depression, anemia presents for evaluation of positive urine culture.  Patient has a suprapubic catheter in place.  She was seen by her urologist recently and a urine culture was done after complaints of bladder pressure and pain.  Culture showed MRSA that is sensitive to Bactrim and vancomycin.  Patient is allergic to both Bactrim and vancomycin.  Urologist recommended coming to the ED for possible I&D consult according to the notes.  Patient denies fever, nausea, vomiting, abdominal pain.      ORDERS  Labs Reviewed - No data to display    ED Orders (720h ago, onward)    None            Virtual Visit Note: The provider  triage portion of this emergency department evaluation and documentation was performed via [employer], a HIPAA-compliant telemedicine application, in concert with a tele-presenter in the room. A face to face patient evaluation with one of my colleagues will occur once the patient is placed in an emergency department room.      DISCLAIMER: This note was prepared with [employer] voice recognition transcription software. Garbled syntax, mangled pronouns, and other bizarre constructions may be attributed to that software system.

## 2020-05-12 NOTE — ED TRIAGE NOTES
Pt reports to ED today w/ complaints of staph in urine and UTI. Pt reports back pain and lower abdominal pain.

## 2020-05-13 LAB
ANION GAP SERPL CALC-SCNC: 6 MMOL/L (ref 8–16)
ASCENDING AORTA: 2.86 CM
AV INDEX (PROSTH): 0.65
AV MEAN GRADIENT: 4 MMHG
AV PEAK GRADIENT: 8 MMHG
AV VALVE AREA: 2.11 CM2
AV VELOCITY RATIO: 0.57
BASOPHILS # BLD AUTO: 0.02 K/UL (ref 0–0.2)
BASOPHILS NFR BLD: 0.4 % (ref 0–1.9)
BSA FOR ECHO PROCEDURE: 1.91 M2
BUN SERPL-MCNC: 7 MG/DL (ref 8–23)
CALCIUM SERPL-MCNC: 8.7 MG/DL (ref 8.7–10.5)
CHLORIDE SERPL-SCNC: 101 MMOL/L (ref 95–110)
CK SERPL-CCNC: 32 U/L (ref 20–180)
CO2 SERPL-SCNC: 33 MMOL/L (ref 23–29)
CREAT SERPL-MCNC: 0.7 MG/DL (ref 0.5–1.4)
CV ECHO LV RWT: 0.39 CM
DIFFERENTIAL METHOD: ABNORMAL
DOP CALC AO PEAK VEL: 1.38 M/S
DOP CALC AO VTI: 35.23 CM
DOP CALC LVOT AREA: 3.2 CM2
DOP CALC LVOT DIAMETER: 2.03 CM
DOP CALC LVOT PEAK VEL: 0.78 M/S
DOP CALC LVOT STROKE VOLUME: 74.27 CM3
DOP CALCLVOT PEAK VEL VTI: 22.96 CM
E WAVE DECELERATION TIME: 249.96 MSEC
E/A RATIO: 1.61
E/E' RATIO: 13.65 M/S
ECHO LV POSTERIOR WALL: 0.79 CM (ref 0.6–1.1)
EOSINOPHIL # BLD AUTO: 0.2 K/UL (ref 0–0.5)
EOSINOPHIL NFR BLD: 3.3 % (ref 0–8)
ERYTHROCYTE [DISTWIDTH] IN BLOOD BY AUTOMATED COUNT: 13.8 % (ref 11.5–14.5)
EST. GFR  (AFRICAN AMERICAN): >60 ML/MIN/1.73 M^2
EST. GFR  (NON AFRICAN AMERICAN): >60 ML/MIN/1.73 M^2
FRACTIONAL SHORTENING: 30 % (ref 28–44)
GLUCOSE SERPL-MCNC: 109 MG/DL (ref 70–110)
HCT VFR BLD AUTO: 33.4 % (ref 37–48.5)
HGB BLD-MCNC: 10.5 G/DL (ref 12–16)
IMM GRANULOCYTES # BLD AUTO: 0.01 K/UL (ref 0–0.04)
IMM GRANULOCYTES NFR BLD AUTO: 0.2 % (ref 0–0.5)
INTERVENTRICULAR SEPTUM: 0.67 CM (ref 0.6–1.1)
LA MAJOR: 5.76 CM
LA MINOR: 5.5 CM
LA WIDTH: 4.18 CM
LACTATE SERPL-SCNC: 1.8 MMOL/L (ref 0.5–2.2)
LACTATE SERPL-SCNC: 2.4 MMOL/L (ref 0.5–2.2)
LEFT ATRIUM SIZE: 4.13 CM
LEFT ATRIUM VOLUME INDEX: 43.8 ML/M2
LEFT ATRIUM VOLUME: 82.57 CM3
LEFT INTERNAL DIMENSION IN SYSTOLE: 2.86 CM (ref 2.1–4)
LEFT VENTRICLE DIASTOLIC VOLUME INDEX: 39.33 ML/M2
LEFT VENTRICLE DIASTOLIC VOLUME: 74.22 ML
LEFT VENTRICLE MASS INDEX: 46 G/M2
LEFT VENTRICLE SYSTOLIC VOLUME INDEX: 16.4 ML/M2
LEFT VENTRICLE SYSTOLIC VOLUME: 31.01 ML
LEFT VENTRICULAR INTERNAL DIMENSION IN DIASTOLE: 4.1 CM (ref 3.5–6)
LEFT VENTRICULAR MASS: 86.26 G
LV LATERAL E/E' RATIO: 10.55 M/S
LV SEPTAL E/E' RATIO: 19.33 M/S
LYMPHOCYTES # BLD AUTO: 2 K/UL (ref 1–4.8)
LYMPHOCYTES NFR BLD: 39.9 % (ref 18–48)
MAGNESIUM SERPL-MCNC: 2 MG/DL (ref 1.6–2.6)
MCH RBC QN AUTO: 28.2 PG (ref 27–31)
MCHC RBC AUTO-ENTMCNC: 31.4 G/DL (ref 32–36)
MCV RBC AUTO: 90 FL (ref 82–98)
MONOCYTES # BLD AUTO: 0.5 K/UL (ref 0.3–1)
MONOCYTES NFR BLD: 9 % (ref 4–15)
MV PEAK A VEL: 0.72 M/S
MV PEAK E VEL: 1.16 M/S
NEUTROPHILS # BLD AUTO: 2.4 K/UL (ref 1.8–7.7)
NEUTROPHILS NFR BLD: 47.2 % (ref 38–73)
NRBC BLD-RTO: 0 /100 WBC
PISA TR MAX VEL: 2.21 M/S
PLATELET # BLD AUTO: 168 K/UL (ref 150–350)
PMV BLD AUTO: 10.1 FL (ref 9.2–12.9)
POTASSIUM SERPL-SCNC: 3.2 MMOL/L (ref 3.5–5.1)
RA MAJOR: 5.12 CM
RA PRESSURE: 3 MMHG
RA WIDTH: 4.06 CM
RBC # BLD AUTO: 3.72 M/UL (ref 4–5.4)
RIGHT VENTRICULAR END-DIASTOLIC DIMENSION: 3.81 CM
SINUS: 2.7 CM
SODIUM SERPL-SCNC: 140 MMOL/L (ref 136–145)
STJ: 2.93 CM
TDI LATERAL: 0.11 M/S
TDI SEPTAL: 0.06 M/S
TDI: 0.09 M/S
TR MAX PG: 20 MMHG
TRICUSPID ANNULAR PLANE SYSTOLIC EXCURSION: 2.36 CM
TROPONIN I SERPL DL<=0.01 NG/ML-MCNC: 0.01 NG/ML (ref 0–0.03)
TV REST PULMONARY ARTERY PRESSURE: 23 MMHG
WBC # BLD AUTO: 5.09 K/UL (ref 3.9–12.7)

## 2020-05-13 PROCEDURE — 25000003 PHARM REV CODE 250: Mod: HCNC | Performed by: HOSPITALIST

## 2020-05-13 PROCEDURE — 85025 COMPLETE CBC W/AUTO DIFF WBC: CPT | Mod: HCNC

## 2020-05-13 PROCEDURE — 82550 ASSAY OF CK (CPK): CPT | Mod: HCNC

## 2020-05-13 PROCEDURE — 63600175 PHARM REV CODE 636 W HCPCS: Mod: HCNC | Performed by: HOSPITALIST

## 2020-05-13 PROCEDURE — 25000003 PHARM REV CODE 250: Mod: HCNC | Performed by: INTERNAL MEDICINE

## 2020-05-13 PROCEDURE — 83735 ASSAY OF MAGNESIUM: CPT | Mod: HCNC

## 2020-05-13 PROCEDURE — 83605 ASSAY OF LACTIC ACID: CPT | Mod: HCNC

## 2020-05-13 PROCEDURE — 63600175 PHARM REV CODE 636 W HCPCS: Mod: JG,HCNC | Performed by: PHYSICIAN ASSISTANT

## 2020-05-13 PROCEDURE — 63600175 PHARM REV CODE 636 W HCPCS: Mod: HCNC | Performed by: INTERNAL MEDICINE

## 2020-05-13 PROCEDURE — 93010 EKG 12-LEAD: ICD-10-PCS | Mod: HCNC,,, | Performed by: INTERNAL MEDICINE

## 2020-05-13 PROCEDURE — 99223 1ST HOSP IP/OBS HIGH 75: CPT | Mod: HCNC,,, | Performed by: PHYSICIAN ASSISTANT

## 2020-05-13 PROCEDURE — 25000003 PHARM REV CODE 250: Mod: HCNC | Performed by: PHYSICIAN ASSISTANT

## 2020-05-13 PROCEDURE — 87040 BLOOD CULTURE FOR BACTERIA: CPT | Mod: HCNC

## 2020-05-13 PROCEDURE — 36415 COLL VENOUS BLD VENIPUNCTURE: CPT | Mod: HCNC

## 2020-05-13 PROCEDURE — 99223 PR INITIAL HOSPITAL CARE,LEVL III: ICD-10-PCS | Mod: HCNC,,, | Performed by: PHYSICIAN ASSISTANT

## 2020-05-13 PROCEDURE — 99232 PR SUBSEQUENT HOSPITAL CARE,LEVL II: ICD-10-PCS | Mod: HCNC,,, | Performed by: INTERNAL MEDICINE

## 2020-05-13 PROCEDURE — 11000001 HC ACUTE MED/SURG PRIVATE ROOM: Mod: HCNC

## 2020-05-13 PROCEDURE — 93010 ELECTROCARDIOGRAM REPORT: CPT | Mod: HCNC,,, | Performed by: INTERNAL MEDICINE

## 2020-05-13 PROCEDURE — 84484 ASSAY OF TROPONIN QUANT: CPT | Mod: HCNC

## 2020-05-13 PROCEDURE — 93005 ELECTROCARDIOGRAM TRACING: CPT | Mod: HCNC

## 2020-05-13 PROCEDURE — 99232 SBSQ HOSP IP/OBS MODERATE 35: CPT | Mod: HCNC,,, | Performed by: INTERNAL MEDICINE

## 2020-05-13 PROCEDURE — 80048 BASIC METABOLIC PNL TOTAL CA: CPT | Mod: HCNC

## 2020-05-13 RX ORDER — POTASSIUM CHLORIDE 20 MEQ/1
40 TABLET, EXTENDED RELEASE ORAL ONCE
Status: COMPLETED | OUTPATIENT
Start: 2020-05-13 | End: 2020-05-13

## 2020-05-13 RX ADMIN — PANTOPRAZOLE SODIUM 40 MG: 40 TABLET, DELAYED RELEASE ORAL at 10:05

## 2020-05-13 RX ADMIN — HYDROCODONE BITARTRATE AND ACETAMINOPHEN 1 TABLET: 10; 325 TABLET ORAL at 09:05

## 2020-05-13 RX ADMIN — ASPIRIN 81 MG: 81 TABLET, DELAYED RELEASE ORAL at 10:05

## 2020-05-13 RX ADMIN — QUETIAPINE FUMARATE 100 MG: 25 TABLET ORAL at 09:05

## 2020-05-13 RX ADMIN — LEVOTHYROXINE SODIUM 125 MCG: 125 TABLET ORAL at 06:05

## 2020-05-13 RX ADMIN — LINEZOLID 600 MG: 600 INJECTION, SOLUTION INTRAVENOUS at 12:05

## 2020-05-13 RX ADMIN — SODIUM CHLORIDE, SODIUM LACTATE, POTASSIUM CHLORIDE, AND CALCIUM CHLORIDE 500 ML: .6; .31; .03; .02 INJECTION, SOLUTION INTRAVENOUS at 12:05

## 2020-05-13 RX ADMIN — FLUOXETINE 60 MG: 20 CAPSULE ORAL at 10:05

## 2020-05-13 RX ADMIN — DAPTOMYCIN 620 MG: 350 INJECTION, POWDER, LYOPHILIZED, FOR SOLUTION INTRAVENOUS at 03:05

## 2020-05-13 RX ADMIN — TRAZODONE HYDROCHLORIDE 300 MG: 100 TABLET ORAL at 09:05

## 2020-05-13 RX ADMIN — OXYBUTYNIN CHLORIDE 15 MG: 15 TABLET, EXTENDED RELEASE ORAL at 10:05

## 2020-05-13 RX ADMIN — ENOXAPARIN SODIUM 40 MG: 100 INJECTION SUBCUTANEOUS at 05:05

## 2020-05-13 RX ADMIN — POTASSIUM CHLORIDE 40 MEQ: 1500 TABLET, EXTENDED RELEASE ORAL at 10:05

## 2020-05-13 RX ADMIN — ATORVASTATIN CALCIUM 80 MG: 20 TABLET, FILM COATED ORAL at 10:05

## 2020-05-13 NOTE — H&P
Hospital Medicine  History and Physical Exam    Team: Networked reference to record PCT  Ang Menendez MD  Admit Date: 5/12/2020  Principal Problem:  Urinary tract infection associated with cystostomy catheter   Patient information was obtained from patient, past medical records and ER records.   Primary care Physician: Mesfin Hodges Ii, MD  Code status: Full Code    HPI: 63 yo F with PMHx neurogenic bladder with suprapubic catheter, chronic back pain since a work accident in 1997 (has had 12 back surgeries) on dilaudid pump, CAD, hypothyroidism, sleep apnea, and GONZALO who presents to the ED seeking treatment for her MRSA UTI. The patient reports that she has been having suprapubic pressure and burning for the last 10 days. She also reports associated malaise and chills for the last 3 days. The patient lives in [place], but she has HH that changes her suprapubic catheter every 3 weeks. She reports that she called her urologist 5/4 about her symptoms, and her urologist recommended having her catheter change to obtain a clean urinary sample (even though the patient had just had it changed 4/28). Home Health came the next day, 5/5 and the patient was initially prescribed empiric treatment with augmentin. The patient reports that she only took one day of the augmentin before her urologist called and informed her that she had MRSA. Since then the patient reports developing worsening pain/burning as well as the chills and malaise. She denies any diarrhea, nausea, vomiting, or flank pain.    Hemoglobin A1C   Date Value Ref Range Status   08/25/2016 5.4 4.5 - 6.2 % Final     Comment:     According to ADA guidelines, hemoglobin A1C <7.0% represents  optimal control in non-pregnant diabetic patients.  Different  metrics may apply to specific populations.   Standards of Medical Care in Diabetes - 2016.  For the purpose of screening for the presence of diabetes:  <5.7%     Consistent with the absence of diabetes  5.7-6.4%   Consistent with increasing risk for diabetes   (prediabetes)  >or=6.5%  Consistent with diabetes  Currently no consensus exists for use of hemoglobin A1C  for diagnosis of diabetes for children.     03/28/2015 5.4 4.5 - 6.2 % Final   05/28/2014 5.8 4.5 - 6.2 % Final       Past Medical History: Patient has a past medical history of Anticoagulant long-term use, Anxiety, Arthritis, Bilateral lower extremity edema, Carotid artery occlusion, Cataract, Coronary artery disease, Depression, Fever blister, Hypothyroid, Iron deficiency anemia, Lumbar radiculopathy, Ocular migraine, and Sleep apnea.    Past Surgical History: Patient has a past surgical history that includes Hysterectomy (1975); Back surgery; pain pump placement; Spine surgery (5-13-13); Cataract extraction w/  intraocular lens implant (Left); Spinal cord stimulator removal; Esophagogastroduodenoscopy (N/A, 5/23/2018); Tonsillectomy; Adenoidectomy; Cardiac catheterization (2016); Cystoscopic litholapaxy (N/A, 6/27/2019); Cystoscopic litholapaxy (N/A, 9/3/2019); and Replacement of catheter (N/A, 10/31/2019).    Social History: Patient reports that she has never smoked. She has never used smokeless tobacco. She reports that she does not drink alcohol or use drugs.    Family History: family history includes Cancer in her father; Cancer (age of onset: 55) in her mother; Cirrhosis in her paternal aunt and paternal uncle; Colon cancer in her maternal uncle; Esophageal cancer in her father; Heart disease in her maternal uncle; Liver disease in her paternal aunt and paternal uncle; No Known Problems in her brother, brother, maternal aunt, maternal grandfather, paternal grandfather, paternal grandmother, and sister; Parkinsonism in her maternal grandmother; Tremor in her maternal grandmother.    Medications: reviewed     Allergies: Patient is allergic to (d)-limonene flavor; bactrim [sulfamethoxazole-trimethoprim]; benadryl [diphenhydramine hcl]; imitrex [sumatriptan  succinate]; topamax [topiramate]; vancomycin; butorphanol tartrate; darvocet a500 [propoxyphene n-acetaminophen]; fentanyl; white petrolatum-zinc; zinc oxide-white petrolatum; latex, natural rubber; and phenytoin.    ROS  Pain Scale: 4 /10   Constitutional: Positive for chills and generalized malaise  Respiratory: no cough or shortness of breath  Cardiovascular: no chest pain or palpitations  Gastrointestinal: no nausea or vomiting, no abdominal pain or change in bowel habits  Genitourinary: Positive for suprapubic pain and bloody drainage  Integument/Breast: no rash or pruritis  Hematologic/Lymphatic: no easy bruising or lymphadenopathy  Musculoskeletal: no arthralgias or myalgias  Neurological: no seizures or tremors  Behavioral/Psych: Positive for anxiety    PEx  Temp:  [98 °F (36.7 °C)]   Pulse:  [45-54]   Resp:  [18-20]   BP: (105-107)/(54-55)   SpO2:  [98 %-100 %]   Body mass index is 26.23 kg/m².   No intake or output data in the 24 hours ending 05/12/20 2034    General appearance: no distress, pt. Resting comfortably  Mental status: Alert and oriented x 3  HEENT:  conjunctivae/corneas clear, PERRL  Neck: supple, thyroid not enlarged  Pulm:   normal respiratory effort, CTA B, no c/w/r  Card: RRR, S1, S2 normal, no murmur, click, rub or gallop  Abd: soft, ND, Suprapubic catheter in placed with dry bloud and small amount of purulent drainage around stoma  Ext: 2+ non-pitting edema B/L with chronic skin changes  Pulses: 2+, symmetric  Skin: color, texture, turgor normal. No rashes or lesions  Neuro: CN II-XII grossly intact, no focal numbness or weakness, normal strength and tone     Recent Results (from the past 24 hour(s))   COVID-19 Rapid Screening    Collection Time: 05/12/20  7:03 PM   Result Value Ref Range    SARS-CoV-2 RNA, Amplification, Qual Negative Negative   CBC auto differential    Collection Time: 05/12/20  7:14 PM   Result Value Ref Range    WBC 5.44 3.90 - 12.70 K/uL    RBC 4.16 4.00 - 5.40 M/uL     Hemoglobin 11.5 (L) 12.0 - 16.0 g/dL    Hematocrit 37.4 37.0 - 48.5 %    Mean Corpuscular Volume 90 82 - 98 fL    Mean Corpuscular Hemoglobin 27.6 27.0 - 31.0 pg    Mean Corpuscular Hemoglobin Conc 30.7 (L) 32.0 - 36.0 g/dL    RDW 13.8 11.5 - 14.5 %    Platelets 186 150 - 350 K/uL    MPV 10.3 9.2 - 12.9 fL    Immature Granulocytes 0.4 0.0 - 0.5 %    Gran # (ANC) 3.3 1.8 - 7.7 K/uL    Immature Grans (Abs) 0.02 0.00 - 0.04 K/uL    Lymph # 1.5 1.0 - 4.8 K/uL    Mono # 0.5 0.3 - 1.0 K/uL    Eos # 0.1 0.0 - 0.5 K/uL    Baso # 0.02 0.00 - 0.20 K/uL    nRBC 0 0 /100 WBC    Gran% 60.5 38.0 - 73.0 %    Lymph% 27.0 18.0 - 48.0 %    Mono% 9.7 4.0 - 15.0 %    Eosinophil% 2.0 0.0 - 8.0 %    Basophil% 0.4 0.0 - 1.9 %    Differential Method Automated    Comprehensive metabolic panel    Collection Time: 05/12/20  7:14 PM   Result Value Ref Range    Sodium 141 136 - 145 mmol/L    Potassium 3.3 (L) 3.5 - 5.1 mmol/L    Chloride 101 95 - 110 mmol/L    CO2 33 (H) 23 - 29 mmol/L    Glucose 90 70 - 110 mg/dL    BUN, Bld 7 (L) 8 - 23 mg/dL    Creatinine 0.8 0.5 - 1.4 mg/dL    Calcium 9.1 8.7 - 10.5 mg/dL    Total Protein 6.8 6.0 - 8.4 g/dL    Albumin 3.6 3.5 - 5.2 g/dL    Total Bilirubin 0.4 0.1 - 1.0 mg/dL    Alkaline Phosphatase 99 55 - 135 U/L    AST 12 10 - 40 U/L    ALT 5 (L) 10 - 44 U/L    Anion Gap 7 (L) 8 - 16 mmol/L    eGFR if African American >60.0 >60 mL/min/1.73 m^2    eGFR if non African American >60.0 >60 mL/min/1.73 m^2       No results for input(s): POCTGLUCOSE in the last 168 hours.    Active Hospital Problems    Diagnosis  POA    Urinary tract infection associated with cystostomy catheter [T83.510A, N39.0]  Yes      Resolved Hospital Problems   No resolved problems to display.         Assessment and Plan:  MRSA UTI associated with cystostomy catheter  Neurogenic Bladdder  -Suprapubic pressure and burning reported, urine cx from 5/8 showing MRSA.  -Pt. Afebrile, hemodynamically stable, WBC normal.  -MRSA infection  complicated by patient's allergies to bactrim and vancomycin. Linezolid given in ED, will continue at 600 mg Q12 and consult ID for further recommendations regarding treatment  -Urology consulted in ED for management of suprapubic catheter (bloody drainage noted)    Lymphedema of both lower extremities  - Chronic issue that patient has had for years  -Continue home lasix     Primary hypothyroidism  - Continue home levothyroxine     Coronary artery disease involving native coronary artery of native heart without angina pectoris  Pure hypercholesterolemia   - Continue ASA 81 mg and statin     GERD (gastroesophageal reflux disease)  -Continue home med 40 mg pantoprazole     Chronic pain syndrome  Narcotic dependency  - Patient currently has dilaudid pump and uses Norco  mg at home for breakthrough pain  -Will continue home meds  -Miralax, colace, and senna ordered for bowel regimen     Generalized anxiety disorder  -Continue home meds, seroquel and trazodone    DVT PPx: Lovenox    Ang Menendez MD  Hospital Medicine Staff  [phone] pager

## 2020-05-13 NOTE — PLAN OF CARE
CM met with patient in room for Discharge Planning Assessment. Per patient,  she lives with her  in a house with a ramp to enter.   Per patient, she was dependent with ADLS and used DME for ambulation.  Patient will have assistance from  upon discharge.   Discharge Planning Booklet given to patient/family and discussed.  All questions addressed.  CM will follow for needs.     05/13/20 1099   Discharge Assessment   Assessment Type Discharge Planning Assessment   Confirmed/corrected address and phone number on facesheet? Yes   Assessment information obtained from? Patient   Communicated expected length of stay with patient/caregiver yes   Prior to hospitilization cognitive status: Alert/Oriented   Prior to hospitalization functional status: Needs Assistance;Assistive Equipment   Current cognitive status: Alert/Oriented   Current Functional Status: Needs Assistance;Assistive Equipment   Lives With spouse   Able to Return to Prior Arrangements yes   Is patient able to care for self after discharge? No   Who are your caregiver(s) and their phone number(s)? Dangelo Hawley [phone]   Patient's perception of discharge disposition home health   Readmission Within the Last 30 Days no previous admission in last 30 days   Patient currently being followed by outpatient case management? No   Patient currently receives any other outside agency services? Yes   Name and contact number of agency or person providing outside services [facility]    Is it the patient/care giver preference to resume care with the current outside agency? Yes   Equipment Currently Used at Home walker, rolling;cane, straight;bedside commode;shower chair   Do you have any problems affording any of your prescribed medications? No   Is the patient taking medications as prescribed? yes   Does the patient have transportation home? Yes   Transportation Anticipated family or friend will provide   Discharge Plan A Home Health   Discharge  Plan B Skilled Nursing Facility   DME Needed Upon Discharge  none   Patient/Family in Agreement with Plan yes            PCP:  Mesfin Hodges Ii, MD        Pharmacy:    [facility] [place]  [place] - [address]  [address]  [place] [address]  Phone: [phone] Fax: [phone]        Emergency Contacts:  Extended Emergency Contact Information  Primary Emergency Contact: Dangelo Hawley  Address: [address] [place] [address] [facility]  Home Phone: [phone]  Mobile Phone: [phone]  Relation: Spouse  Secondary Emergency Contact: Donna Lisa   [place]  Mobile Phone: [phone]  Relation: Daughter      Insurance:    Payor: [employer] MANAGED MEDICARE / Plan: [employer] MEDICARE HMO / Product Type: Capitation /       05/13/2020  1:38 PM      Jessica Allan RN, CM   Ext: [phone]

## 2020-05-13 NOTE — ASSESSMENT & PLAN NOTE
63 yo F with neurogenic bladder managed with SPT, admitted for IV antibiotics for MRSA UTI. Urology consulted for bloody drainage at SPT site.    - SPT site not actively bleeding, no active secretions  - suspect that bleeding likely due to UTI, recent SPT exchange may be contributing  - no further urologic intervention  - continue IV antibiotics per primary; currently receiving linezolid

## 2020-05-13 NOTE — CONSULTS
[facility]  Urology  Consult Note    Patient Name: Tasha Hawley  MRN: [id number]  Admission Date: 5/12/2020  Hospital Length of Stay: 1   Code Status: Prior   Attending Provider: Cora Valles*   Consulting Provider: Chau Briones MD  Primary Care Physician: Mesfin Hodges Ii, MD  Principal Problem:Urinary tract infection associated with cystostomy catheter    Inpatient consult to Urology  Consult performed by: Chau Briones MD  Consult ordered by: Kimmie Nassar MD          Subjective:     HPI:  Ms. Hawley is a 65 yo F with a PMH of neurogenic bladder s/p SPT placement , as well as autologous fascial sling placement in 09/2019. She has had recurrent UTIs. She was having suprapubic pressure, so her SPT was changed by Home Health on 5/5 and urine was sent for culture - grew MRSA. She is afebrile with stable vitals and normal WBC. She was admitted on 5/12 for antibiotic treatment and has been started on linezolid - allergic to Bactrim and Vanc. Urology was consulted for bloody drainage at SPT site. Cr is 0.7 (baseline). UA shows many bacteria, >100 WBC, 22 RBC. Patient reports lower back pain, not flank pain, as well as suprapubic pressure and tenderness.     Past Medical History:   Diagnosis Date    Anticoagulant long-term use     Anxiety     Arthritis     Bilateral lower extremity edema     severe chronic    Carotid artery occlusion     Cataract     Coronary artery disease     subtotalled LAD with collateral    Depression     Fever blister     Hypothyroid     Iron deficiency anemia     Lumbar radiculopathy     with chronic pain    Ocular migraine     Sleep apnea     cpap       Past Surgical History:   Procedure Laterality Date    ADENOIDECTOMY      BACK SURGERY      x 12    CARDIAC CATHETERIZATION  2016    subtotalled LAD with right to left collaterals    CATARACT EXTRACTION W/  INTRAOCULAR LENS IMPLANT Left     Dr Coleman     CYSTOSCOPIC LITHOLAPAXY N/A  6/27/2019    Procedure: CYSTOLITHOLAPAXY;  Surgeon: Shireen Mayo MD;  Location: [facility] OR 2ND FLR;  Service: Urology;  Laterality: N/A;    CYSTOSCOPIC LITHOLAPAXY N/A 9/3/2019    Procedure: CYSTOLITHOLAPAXY;  Surgeon: Shireen Mayo MD;  Location: [facility] OR 2ND FLR;  Service: Urology;  Laterality: N/A;    ESOPHAGOGASTRODUODENOSCOPY N/A 5/23/2018    Procedure: ESOPHAGOGASTRODUODENOSCOPY (EGD);  Surgeon: Prince Vance MD;  Location: [facility] ENDO (4TH FLR);  Service: Endoscopy;  Laterality: N/A;  r/s 'd per Dr. Vance due to family emergency- ER    HYSTERECTOMY  1975    endometriosis    pain pump placement      SQ Dilaudid Pump managed by Dr. Hillman, Pain Management    REPLACEMENT OF CATHETER N/A 10/31/2019    Procedure: REPLACEMENT, CATHETER-SUPRAPUBIC;  Surgeon: Shireen Mayo MD;  Location: [facility] OR 1ST FLR;  Service: Urology;  Laterality: N/A;    SPINAL CORD STIMULATOR REMOVAL      before [place]    SPINE SURGERY  5-13-13    CERVICAL FUSION    TONSILLECTOMY         Review of patient's allergies indicates:   Allergen Reactions    (d)-limonene flavor      Other reaction(s): difficult intubation  Other reaction(s): Difficulty breathing    Bactrim [sulfamethoxazole-trimethoprim] Anaphylaxis    Benadryl [diphenhydramine hcl] Shortness Of Breath    Imitrex [sumatriptan succinate] Shortness Of Breath    Topamax [topiramate] Shortness Of Breath    Vancomycin Shortness Of Breath     Rash    Butorphanol tartrate     Darvocet a500 [propoxyphene n-acetaminophen]      Other reaction(s): Difficulty breathing    Fentanyl      Other reaction(s): Vomiting  Other reaction(s): Nausea  Other reaction(s): Itching  swelling    White petrolatum-zinc     Zinc oxide-white petrolatum      Other reaction(s): Difficulty breathing    Latex, natural rubber Itching and Rash    Phenytoin Rash and Other (See Comments)     Trouble breathing       Family History     Problem Relation (Age of Onset)    Cancer Mother  (55), Father    Cirrhosis Paternal Aunt, Paternal Uncle    Colon cancer Maternal Uncle    Esophageal cancer Father    Heart disease Maternal Uncle    Liver disease Paternal Aunt, Paternal Uncle    No Known Problems Brother, Brother, Sister, Maternal Aunt, Maternal Grandfather, Paternal Grandmother, Paternal Grandfather    Parkinsonism Maternal Grandmother    Tremor Maternal Grandmother          Tobacco Use    Smoking status: Never Smoker    Smokeless tobacco: Never Used   Substance and Sexual Activity    Alcohol use: Never     Frequency: Never    Drug use: No    Sexual activity: Never     Partners: Male       Review of Systems    Objective:     Temp:  [98 °F (36.7 °C)] 98 °F (36.7 °C)  Pulse:  [43-54] 46  Resp:  [12-20] 12  SpO2:  [95 %-100 %] 96 %  BP: (100-114)/(54-58) 114/58     Body mass index is 26.29 kg/m².           Drains     Drain                 Suprapubic Catheter 10/31/19 1317 100% silicone 20 Fr. 194 days                Physical Exam   Constitutional: She is oriented to person, place, and time. She appears well-developed and well-nourished.   HENT:   Head: Normocephalic and atraumatic.   Eyes: Conjunctivae are normal.   Neck: Normal range of motion.   Cardiovascular: Normal rate.    Pulmonary/Chest: Effort normal.   Abdominal: Soft. She exhibits no distension.   Suprapubic and lower back tenderness    Genitourinary:   Genitourinary Comments: Dried blood around SPT site; SPT draining clear yellow urine   Musculoskeletal: Normal range of motion.   Neurological: She is alert and oriented to person, place, and time.   Skin: Skin is warm and dry.     Psychiatric: She has a normal mood and affect. Her behavior is normal. Judgment and thought content normal.       Significant Labs:    BMP:  Recent Labs   Lab 05/12/20 1914 05/13/20  0327    140   K 3.3* 3.2*    101   CO2 33* 33*   BUN 7* 7*   CREATININE 0.8 0.7   CALCIUM 9.1 8.7       CBC:  Recent Labs   Lab 05/12/20 1914 05/13/20  0327   WBC  5.44 5.09   HGB 11.5* 10.5*   HCT 37.4 33.4*    168       All pertinent labs results from the past 24 hours have been reviewed.    Significant Imaging:  All pertinent imaging results/findings from the past 24 hours have been reviewed.                    Assessment and Plan:     Neurogenic bladder  65 yo F with neurogenic bladder managed with SPT, admitted for IV antibiotics for MRSA UTI. Urology consulted for bloody drainage at SPT site.    - SPT site not actively bleeding, no active secretions  - suspect that bleeding likely due to UTI, recent SPT exchange may be contributing  - no further urologic intervention  - continue IV antibiotics per primary; currently receiving linezolid        VTE Risk Mitigation (From admission, onward)         Ordered     enoxaparin injection 40 mg  Daily      05/12/20 2044     IP VTE HIGH RISK PATIENT  Once      05/12/20 2044                Thank you for your consult. I will sign off. Please contact us if you have any additional questions.    Chau Briones MD  Urology  [facility]

## 2020-05-13 NOTE — SUBJECTIVE & OBJECTIVE
Past Medical History:   Diagnosis Date    Anticoagulant long-term use     Anxiety     Arthritis     Bilateral lower extremity edema     severe chronic    Carotid artery occlusion     Cataract     Coronary artery disease     subtotalled LAD with collateral    Depression     Fever blister     Hypothyroid     Iron deficiency anemia     Lumbar radiculopathy     with chronic pain    Ocular migraine     Sleep apnea     cpap       Past Surgical History:   Procedure Laterality Date    ADENOIDECTOMY      BACK SURGERY      x 12    CARDIAC CATHETERIZATION  2016    subtotalled LAD with right to left collaterals    CATARACT EXTRACTION W/  INTRAOCULAR LENS IMPLANT Left     Dr Coleman     CYSTOSCOPIC LITHOLAPAXY N/A 6/27/2019    Procedure: CYSTOLITHOLAPAXY;  Surgeon: Shireen Mayo MD;  Location: [facility] OR 2ND FLR;  Service: Urology;  Laterality: N/A;    CYSTOSCOPIC LITHOLAPAXY N/A 9/3/2019    Procedure: CYSTOLITHOLAPAXY;  Surgeon: Shireen Mayo MD;  Location: [facility] OR 2ND FLR;  Service: Urology;  Laterality: N/A;    ESOPHAGOGASTRODUODENOSCOPY N/A 5/23/2018    Procedure: ESOPHAGOGASTRODUODENOSCOPY (EGD);  Surgeon: Prince Vance MD;  Location: [facility] (4TH FLR);  Service: Endoscopy;  Laterality: N/A;  r/s 'd per Dr. Vance due to family emergency- ER    HYSTERECTOMY  1975    endometriosis    pain pump placement      SQ Dilaudid Pump managed by Dr. Hillman, Pain Management    REPLACEMENT OF CATHETER N/A 10/31/2019    Procedure: REPLACEMENT, CATHETER-SUPRAPUBIC;  Surgeon: Shireen Mayo MD;  Location: [facility] OR 1ST FLR;  Service: Urology;  Laterality: N/A;    SPINAL CORD STIMULATOR REMOVAL      before Larissa    SPINE SURGERY  5-13-13    CERVICAL FUSION    TONSILLECTOMY         Review of patient's allergies indicates:   Allergen Reactions    (d)-limonene flavor      Other reaction(s): difficult intubation  Other reaction(s): Difficulty breathing    Bactrim [sulfamethoxazole-trimethoprim]  Anaphylaxis    Benadryl [diphenhydramine hcl] Shortness Of Breath    Imitrex [sumatriptan succinate] Shortness Of Breath    Topamax [topiramate] Shortness Of Breath    Vancomycin Shortness Of Breath     Rash    Butorphanol tartrate     Darvocet a500 [propoxyphene n-acetaminophen]      Other reaction(s): Difficulty breathing    Fentanyl      Other reaction(s): Vomiting  Other reaction(s): Nausea  Other reaction(s): Itching  swelling    White petrolatum-zinc     Zinc oxide-white petrolatum      Other reaction(s): Difficulty breathing    Latex, natural rubber Itching and Rash    Phenytoin Rash and Other (See Comments)     Trouble breathing       Medications:  Medications Prior to Admission   Medication Sig    amoxicillin-clavulanate 500-125mg (AUGMENTIN) 500-125 mg Tab Take 1 tablet (500 mg total) by mouth 3 (three) times daily. for 7 days    ascorbic acid, vitamin C, (VITAMIN C) 500 MG tablet Take 500 mg by mouth once daily.    aspirin (ECOTRIN) 81 MG EC tablet Take 1 tablet (81 mg total) by mouth once daily.    atorvastatin (LIPITOR) 80 MG tablet TAKE 1 TABLET BY MOUTH DAILY    butalbital-acetaminophen-caffeine -40 mg (FIORICET, ESGIC) -40 mg per tablet Take 1 tablet by mouth every 4 (four) hours as needed for Headaches.     cholecalciferol, vitamin D3, (VITAMIN D3) 50 mcg (2,000 unit) Cap Take 1 capsule (2,000 Units total) by mouth once daily.    ergocalciferol (ERGOCALCIFEROL) 50,000 unit Cap Take 1 capsule (50,000 Units total) by mouth every 7 days. for 12 doses    ferrous sulfate (FEOSOL) 325 mg (65 mg iron) Tab tablet Take 1 tablet (325 mg total) by mouth once daily.    fluconazole (DIFLUCAN) 100 MG tablet 2 tabs day 1; then 1 tab daily until gone; 7 days of treatment    FLUoxetine 20 MG capsule Take 3 capsules (60 mg total) by mouth once daily.    furosemide (LASIX) 40 MG tablet Take 3 tablets (120 mg total) by mouth once daily. Two in the morning and one in the evening     gabapentin (NEURONTIN) 100 MG capsule Take 1 capsule (100 mg total) by mouth 3 (three) times daily.    HYDROcodone-acetaminophen (NORCO)  mg per tablet Take 1 tablet by mouth every 6 (six) hours as needed for Pain.    intrathecal pain pump compound Hydromorphone (7.5 mg/mL) infusion at 3.6799 mg/day (0.1533 mg/hr) out of a total reservoir volume of 37.3 mL    levothyroxine (SYNTHROID) 125 MCG tablet Take 1 tablet (125 mcg total) by mouth before breakfast.    lidocaine (LIDODERM) 5 % Place 1 patch onto the skin daily as needed (pain). USE AS DIRECTED WEAR FOR A MAXIMUM OF 12 HOURS    oxybutynin (DITROPAN XL) 15 MG TR24 Take 15 mg by mouth once daily.    oxyCODONE-acetaminophen (PERCOCET)  mg per tablet     pantoprazole (PROTONIX) 40 MG tablet Take 1 tablet (40 mg total) by mouth once daily.    polyethylene glycol (GLYCOLAX) 17 gram PwPk Take 17 g by mouth once daily.    potassium chloride SA (K-DUR,KLOR-CON) 10 MEQ tablet     potassium citrate (UROCIT-K) 10 mEq (1,080 mg) TbSR Take 1 tablet (10 mEq total) by mouth once daily.    promethazine (PHENERGAN) 25 MG tablet Take 25 mg by mouth every 6 (six) hours as needed for Nausea.    QUEtiapine (SEROQUEL) 100 MG Tab TAKE 1 TABLET (100 MG TOTAL) BY MOUTH EVERY EVENING.    QUEtiapine (SEROQUEL) 25 MG Tab Take 12.5-25 mg twice daily as needed for anxiety    senna (SENNA LAX) 8.6 mg tablet Take 2 tablets by mouth 2 (two) times daily.    tiZANidine (ZANAFLEX) 4 MG tablet     torsemide (DEMADEX) 10 MG Tab Take 1 tablet (10 mg total) by mouth once daily. for 7 days    traZODone (DESYREL) 100 MG tablet Take 3 tablets (300 mg total) by mouth every evening.     Antibiotics (From admission, onward)    Start     Stop Route Frequency Ordered    05/13/20 0730  linezolid 600 mg/300 mL IVPB 600 mg      -- IV Every 12 hours (non-standard times) 05/12/20 2121        Antifungals (From admission, onward)    None        Antivirals (From admission, onward)    None            Immunization History   Administered Date(s) Administered    Influenza 10/22/2008, 12/23/2010, 10/13/2011    Influenza - Quadrivalent 11/14/2014, 09/25/2015    Influenza - Quadrivalent - PF (6 months and older) 10/22/2008, 12/23/2010, 10/13/2011, 11/30/2017, 09/21/2018, 09/21/2018, 11/07/2019    Pneumococcal Conjugate - 13 Valent 05/29/2014, 05/29/2014, 12/29/2017    Pneumococcal Polysaccharide - 23 Valent 05/17/2018, 05/17/2018    Tdap 12/17/2015, 03/02/2017       Family History     Problem Relation (Age of Onset)    Cancer Mother (55), Father    Cirrhosis Paternal Aunt, Paternal Uncle    Colon cancer Maternal Uncle    Esophageal cancer Father    Heart disease Maternal Uncle    Liver disease Paternal Aunt, Paternal Uncle    No Known Problems Brother, Brother, Sister, Maternal Aunt, Maternal Grandfather, Paternal Grandmother, Paternal Grandfather    Parkinsonism Maternal Grandmother    Tremor Maternal Grandmother        Social History     Socioeconomic History    Marital status:      Spouse name: Not on file    Number of children: Not on file    Years of education: Not on file    Highest education level: Not on file   Occupational History    Not on file   Social Needs    Financial resource strain: Not on file    Food insecurity:     Worry: Not on file     Inability: Not on file    Transportation needs:     Medical: Not on file     Non-medical: Not on file   Tobacco Use    Smoking status: Never Smoker    Smokeless tobacco: Never Used   Substance and Sexual Activity    Alcohol use: Never     Frequency: Never    Drug use: No    Sexual activity: Never     Partners: Male   Lifestyle    Physical activity:     Days per week: Not on file     Minutes per session: Not on file    Stress: Not on file   Relationships    Social connections:     Talks on phone: Not on file     Gets together: Not on file     Attends Protestant service: Not on file     Active member of club or organization: Not  on file     Attends meetings of clubs or organizations: Not on file     Relationship status: Not on file   Other Topics Concern    Are you pregnant or think you may be? Not Asked    Breast-feeding Not Asked   Social History Narrative    Not on file     Review of Systems   Constitutional: Positive for activity change and fatigue. Negative for appetite change, chills, diaphoresis and fever.   Respiratory: Negative for cough and shortness of breath.    Cardiovascular: Negative for chest pain and leg swelling.   Gastrointestinal: Positive for abdominal pain. Negative for abdominal distention, blood in stool, constipation, nausea and vomiting.   Genitourinary: Negative for difficulty urinating and dysuria.   Musculoskeletal: Negative for back pain.   Skin: Positive for wound. Negative for color change, pallor and rash.        +suprapubic cath with dry blood   Neurological: Negative for dizziness and headaches.   All other systems reviewed and are negative.    Objective:     Vital Signs (Most Recent):  Temp: 98 °F (36.7 °C) (05/12/20 1724)  Pulse: (!) 47 (05/13/20 1023)  Resp: 12 (05/13/20 0900)  BP: (!) 87/44 (05/13/20 1023)  SpO2: 97 % (05/13/20 0900) Vital Signs (24h Range):  Temp:  [98 °F (36.7 °C)] 98 °F (36.7 °C)  Pulse:  [43-54] 47  Resp:  [12-20] 12  SpO2:  [95 %-100 %] 97 %  BP: ()/(44-58) 87/44     Weight: 77.3 kg (170 lb 5.9 oz)  Body mass index is 26.29 kg/m².    Estimated Creatinine Clearance: 89.1 mL/min (based on SCr of 0.7 mg/dL).    Physical Exam   Constitutional: She is oriented to person, place, and time. She appears well-developed and well-nourished.   HENT:   Head: Normocephalic.   Eyes: Pupils are equal, round, and reactive to light.   Cardiovascular: Normal rate and regular rhythm. Exam reveals no friction rub.   No murmur heard.  Pulmonary/Chest: Effort normal. No stridor. No respiratory distress.   Abdominal: Soft. She exhibits no distension and no mass. There is tenderness. There is no  rebound and no guarding. No hernia.   +suprapubic catheter with dry blood. No active bleeding. No purulent drainage   Musculoskeletal: She exhibits no edema, tenderness or deformity.   Neurological: She is alert and oriented to person, place, and time.   Skin: Skin is warm and dry. She is not diaphoretic. No erythema. No pallor.   Psychiatric: She has a normal mood and affect.   Vitals reviewed.      Significant Labs: All pertinent labs within the past 24 hours have been reviewed.    Significant Imaging: I have reviewed all pertinent imaging results/findings within the past 24 hours.

## 2020-05-13 NOTE — PROGRESS NOTES
Hospital Medicine  Progress Note      Patient Name: Tasha Hawley  MRN: [id number]  Date of Admission: 5/12/2020     Principal Problem: Urinary tract infection associated with cystostomy catheter     Subjective     Pt afebrile and HD stable overnight. However, this AM BP 87/44. 500cc LR bolus with improvement. LA 2.4; will recheck to assess for adequate resuscitation. Abx switched to daptomycin per ID. TTE, BCx and CT A/P pending. Pt complaining of back pain and requesting more pain meds. Also reporting suprapubic discomfort and intermittent chest pain. Trop normal.       Review of Systems     Constitutional: Negative for chills, fatigue, fever.   HENT: Negative for sore throat, trouble swallowing.    Eyes: Negative for photophobia, visual disturbance.   Respiratory: Negative for cough, shortness of breath.    Cardiovascular: Negative for , palpitations, leg swelling. Positive for chest pain  Gastrointestinal: Negative for abdominal pain, constipation, diarrhea, nausea, vomiting.   Endocrine: Negative for cold intolerance, heat intolerance.   Genitourinary: Negative for dysuria, frequency.   Musculoskeletal: POSITIVEfor arthralgias, myalgias.   Skin: Negative for rash, wound, erythema   Neurological: Negative for dizziness, syncope, weakness, light-headedness.   Psychiatric/Behavioral: Negative for confusion, hallucinations, anxiety    Medications  Scheduled Meds:   aspirin  81 mg Oral Daily    atorvastatin  80 mg Oral Daily    DAPTOmycin (CUBICIN)  IV  8 mg/kg Intravenous Q24H    enoxaparin  40 mg Subcutaneous Daily    FLUoxetine  60 mg Oral Daily    furosemide  80 mg Oral Daily    levothyroxine  125 mcg Oral Before breakfast    oxybutynin  15 mg Oral Daily    pantoprazole  40 mg Oral Daily    polyethylene glycol  17 g Oral Daily    QUEtiapine  100 mg Oral Nightly    traZODone  300 mg Oral QHS     Continuous Infusions:  PRN Meds:.albuterol-ipratropium, glucose, glucose, HYDROcodone-acetaminophen,  melatonin, ondansetron, prochlorperazine, senna, sodium chloride 0.9%    Objective    Physical Examination    Temp:  [97.3 °F (36.3 °C)]   Pulse:  [43-49]   Resp:  [7-20]   BP: ()/(44-58)   SpO2:  [95 %-100 %]     Gen: NAD, conversant, ill-appearing  Head: NC, AT  Eyes: PERRLA, EOMI  Throat: MMM, OP clear  CV: RRR, no M/R/G, + peripheral edema, no JVD  Resp: CTAB, no increased work of breathing on room air  GI: Soft, NT, ND, +BS- suprapubic catheter in place with dry blood at site. Palpable induration at RLQ at site of subcutaneous dilaudid pump.  Ext: MAEW, no c/c/e  Neuro: AAOx3, CN grossly intact, no focal neurologic deficits  Psychiatry: Normal mood, normal affect, no SI/HI    CBC  Recent Labs   Lab 05/12/20 1914 05/13/20  0327   WBC 5.44 5.09   HGB 11.5* 10.5*   HCT 37.4 33.4*    168     CMP  Recent Labs   Lab 05/12/20 1914 05/13/20  0327    140   K 3.3* 3.2*    101   CO2 33* 33*   BUN 7* 7*   CREATININE 0.8 0.7   GLU 90 109   CALCIUM 9.1 8.7   MG  --  2.0   ALKPHOS 99  --    ALT 5*  --    AST 12  --    ALBUMIN 3.6  --    PROT 6.8  --    BILITOT 0.4  --            Hospital Course:  Admitted for symptomatic MRSA UTI in the setting of a suprapubic catheter. On daptomycin per ID recs. Workup for source of MRSA pending.    Assessment and Plan:    MRSA UTI associated with cystostomy catheter  Neurogenic Bladdder  -Suprapubic pressure and burning reported, urine cx from 5/8 showing MRSA.  -Pt. Afebrile, hemodynamically stable, WBC normal.  -MRSA infection complicated by patient's allergies to bactrim and vancomycin. Linezolid given in ED  --ID consulted and switched to dapto; also ordered BCx, TTE and CT A/P  -Urology consulted in ED for management of suprapubic catheter (bloody drainage noted); no need for exchange at this time per uro     Lymphedema of both lower extremities  - Chronic issue that patient has had for years  -Continue home lasix     Primary hypothyroidism  - Continue home  levothyroxine     Coronary artery disease involving native coronary artery of native heart without angina pectoris  Pure hypercholesterolemia   - Continue ASA 81 mg and statin     GERD (gastroesophageal reflux disease)  -Continue home med 40 mg pantoprazole     Chronic pain syndrome  Narcotic dependency  - Patient currently has dilaudid pump in place and uses Norco  mg at home for breakthrough pain  -Will continue home meds  -Miralax, colace, and senna ordered for bowel regimen     Generalized anxiety disorder  -Continue home meds, seroquel and trazodone    Diet: reg  VTE PPX: lovenox    Goals of care: full   dispo: home w  pending clinical improvement    Cora Valles M.D.  Department of Hospital Medicine  [facility]  [phone] (pager)

## 2020-05-13 NOTE — ASSESSMENT & PLAN NOTE
65 yo F with PMHx neurogenic bladder with suprapubic catheter, chronic back pain on dilaudid pump, CAD, hypothyroidism, sleep apnea, and GONZALO who presents to the ED for MRSA UTI.  She underwent suprapubic cathter exchange on 5/5/2020.  She reports having supapubic tenderness, bleeding, discharge, and pain for the past 2 weeks. She was started on empiric linezolid. Her repeat UA here with >100 wbcs. Urine cultures are pending. She has no fever chills or night sweats. Urology following, no active bleeding from suprapubic catheter site. No urologic intervention.       Recommendations  1. Discontinue linezolid,started daptomycin as risk of serotonin syndrome with linezolid and trazodone. Monitor CK weekly while on daptomycin.  baseline CK ordered  2. Recommend CT abdomen to r/o abscess given drainage from supapubic cath site.   3. Will obtain blood cultures today given MRSA in urine. Previously with MSSA in urine back in March   4. Recommend TTE.   5. Discussed with ID staff . ID will follow with you.

## 2020-05-13 NOTE — SUBJECTIVE & OBJECTIVE
Past Medical History:   Diagnosis Date    Anticoagulant long-term use     Anxiety     Arthritis     Bilateral lower extremity edema     severe chronic    Carotid artery occlusion     Cataract     Coronary artery disease     subtotalled LAD with collateral    Depression     Fever blister     Hypothyroid     Iron deficiency anemia     Lumbar radiculopathy     with chronic pain    Ocular migraine     Sleep apnea     cpap       Past Surgical History:   Procedure Laterality Date    ADENOIDECTOMY      BACK SURGERY      x 12    CARDIAC CATHETERIZATION  2016    subtotalled LAD with right to left collaterals    CATARACT EXTRACTION W/  INTRAOCULAR LENS IMPLANT Left     Dr Coleman     CYSTOSCOPIC LITHOLAPAXY N/A 6/27/2019    Procedure: CYSTOLITHOLAPAXY;  Surgeon: Shireen Mayo MD;  Location: [facility] OR 2ND FLR;  Service: Urology;  Laterality: N/A;    CYSTOSCOPIC LITHOLAPAXY N/A 9/3/2019    Procedure: CYSTOLITHOLAPAXY;  Surgeon: Shireen Mayo MD;  Location: [facility] OR 2ND FLR;  Service: Urology;  Laterality: N/A;    ESOPHAGOGASTRODUODENOSCOPY N/A 5/23/2018    Procedure: ESOPHAGOGASTRODUODENOSCOPY (EGD);  Surgeon: Prince Vance MD;  Location: [facility] (4TH FLR);  Service: Endoscopy;  Laterality: N/A;  r/s 'd per Dr. Vance due to family emergency- ER    HYSTERECTOMY  1975    endometriosis    pain pump placement      SQ Dilaudid Pump managed by Dr. Hillman, Pain Management    REPLACEMENT OF CATHETER N/A 10/31/2019    Procedure: REPLACEMENT, CATHETER-SUPRAPUBIC;  Surgeon: Shireen Mayo MD;  Location: [facility] OR 1ST FLR;  Service: Urology;  Laterality: N/A;    SPINAL CORD STIMULATOR REMOVAL      before Larissa    SPINE SURGERY  5-13-13    CERVICAL FUSION    TONSILLECTOMY         Review of patient's allergies indicates:   Allergen Reactions    (d)-limonene flavor      Other reaction(s): difficult intubation  Other reaction(s): Difficulty breathing    Bactrim [sulfamethoxazole-trimethoprim]  Anaphylaxis    Benadryl [diphenhydramine hcl] Shortness Of Breath    Imitrex [sumatriptan succinate] Shortness Of Breath    Topamax [topiramate] Shortness Of Breath    Vancomycin Shortness Of Breath     Rash    Butorphanol tartrate     Darvocet a500 [propoxyphene n-acetaminophen]      Other reaction(s): Difficulty breathing    Fentanyl      Other reaction(s): Vomiting  Other reaction(s): Nausea  Other reaction(s): Itching  swelling    White petrolatum-zinc     Zinc oxide-white petrolatum      Other reaction(s): Difficulty breathing    Latex, natural rubber Itching and Rash    Phenytoin Rash and Other (See Comments)     Trouble breathing       Family History     Problem Relation (Age of Onset)    Cancer Mother (55), Father    Cirrhosis Paternal Aunt, Paternal Uncle    Colon cancer Maternal Uncle    Esophageal cancer Father    Heart disease Maternal Uncle    Liver disease Paternal Aunt, Paternal Uncle    No Known Problems Brother, Brother, Sister, Maternal Aunt, Maternal Grandfather, Paternal Grandmother, Paternal Grandfather    Parkinsonism Maternal Grandmother    Tremor Maternal Grandmother          Tobacco Use    Smoking status: Never Smoker    Smokeless tobacco: Never Used   Substance and Sexual Activity    Alcohol use: Never     Frequency: Never    Drug use: No    Sexual activity: Never     Partners: Male       Review of Systems    Objective:     Temp:  [98 °F (36.7 °C)] 98 °F (36.7 °C)  Pulse:  [43-54] 46  Resp:  [12-20] 12  SpO2:  [95 %-100 %] 96 %  BP: (100-114)/(54-58) 114/58     Body mass index is 26.29 kg/m².           Drains     Drain                 Suprapubic Catheter 10/31/19 1317 100% silicone 20 Fr. 194 days                Physical Exam   Constitutional: She is oriented to person, place, and time. She appears well-developed and well-nourished.   HENT:   Head: Normocephalic and atraumatic.   Eyes: Conjunctivae are normal.   Neck: Normal range of motion.   Cardiovascular: Normal rate.     Pulmonary/Chest: Effort normal.   Abdominal: Soft. She exhibits no distension.   Suprapubic and lower back tenderness    Genitourinary:   Genitourinary Comments: Dried blood around SPT site; SPT draining clear yellow urine   Musculoskeletal: Normal range of motion.   Neurological: She is alert and oriented to person, place, and time.   Skin: Skin is warm and dry.     Psychiatric: She has a normal mood and affect. Her behavior is normal. Judgment and thought content normal.       Significant Labs:    BMP:  Recent Labs   Lab 05/12/20 1914 05/13/20 0327    140   K 3.3* 3.2*    101   CO2 33* 33*   BUN 7* 7*   CREATININE 0.8 0.7   CALCIUM 9.1 8.7       CBC:  Recent Labs   Lab 05/12/20 1914 05/13/20 0327   WBC 5.44 5.09   HGB 11.5* 10.5*   HCT 37.4 33.4*    168       All pertinent labs results from the past 24 hours have been reviewed.    Significant Imaging:  All pertinent imaging results/findings from the past 24 hours have been reviewed.

## 2020-05-13 NOTE — CONSULTS
[facility]  Infectious Disease  Consult Note    Patient Name: Tasha Hawley  MRN: [id number]  Admission Date: 5/12/2020  Hospital Length of Stay: 1 days  Attending Physician: Cora Valles*  Primary Care Provider: Mesfin Hodges Ii, MD     Isolation Status: Contact    Patient information was obtained from patient and ER records.      Inpatient consult to Infectious Diseases  Consult performed by: Addis Ross PA-C  Consult ordered by: Kimmie Nassar MD        Assessment/Plan:     * Urinary tract infection associated with cystostomy catheter  65 yo F with PMHx neurogenic bladder with suprapubic catheter, chronic back pain on dilaudid pump, CAD, hypothyroidism, sleep apnea, and GONZALO who presents to the ED for MRSA UTI.  She underwent suprapubic cathter exchange on 5/5/2020.  She reports having supapubic tenderness, bleeding, discharge, and pain for the past 2 weeks. She was started on empiric linezolid. Her repeat UA here with >100 wbcs. Urine cultures are pending. She has no fever chills or night sweats. Urology following, no active bleeding from suprapubic catheter site. No urologic intervention.       Recommendations  1. Discontinue linezolid,started daptomycin as risk of serotonin syndrome with linezolid and trazodone. Monitor CK weekly while on daptomycin.  baseline CK ordered  2. Recommend CT abdomen to r/o abscess given drainage from supapubic cath site.   3. Will obtain blood cultures today given MRSA in urine. Previously with MSSA in urine back in March   4. Recommend TTE.   5. Discussed with ID staff . ID will follow with you.           Thank you for your consult. I will follow-up with patient. Please contact us if you have any additional questions.    Addis Ross PA-C  Infectious Disease  [facility]    Subjective:     Principal Problem: Urinary tract infection associated with cystostomy catheter    HPI: 65 yo F with PMHx neurogenic bladder with  suprapubic catheter, chronic back pain since a work accident in 1997 (has had 12 back surgeries) on dilaudid pump, CAD, hypothyroidism, sleep apnea, and GONZALO who presents to the ED seeking treatment for her MRSA UTI. The patient reports that she has been having suprapubic pressure and burning for the last 10 days. She also reports associated malaise and chills for the last 3 days. The patient lives in [place], but she has HH that changes her suprapubic catheter every 3 weeks. She reports that she called her urologist 5/4 about her symptoms, and her urologist recommended having her catheter change to obtain a clean urinary sample. She had her suprapubic catheter exchanged on 5/5 and was prescribed augmentin for empiric treatment.  The patient reports that she only took one day of the augmentin before her urologist called and informed her that she had MRSA. Since then the patient reports developing worsening pain/burning as well as the chills and malaise. She denies any diarrhea, nausea, vomiting, or flank pain. She was started on empiric linezolid. Her repeat UA here with >100 wbcs. Urine cultures are pending. She has no fever chills or night sweats. Urology following, no active bleeding from suprapubic catheter site. No urologic intervention.     She reports having yellow green drainage bleeding and tenderness from her suprapubic catheter site for two weeks. No fevers at home. No animals at home. No recent travel, no outdoor activities.     Past Medical History:   Diagnosis Date    Anticoagulant long-term use     Anxiety     Arthritis     Bilateral lower extremity edema     severe chronic    Carotid artery occlusion     Cataract     Coronary artery disease     subtotalled LAD with collateral    Depression     Fever blister     Hypothyroid     Iron deficiency anemia     Lumbar radiculopathy     with chronic pain    Ocular migraine     Sleep apnea     cpap       Past Surgical History:   Procedure  Laterality Date    ADENOIDECTOMY      BACK SURGERY      x 12    CARDIAC CATHETERIZATION  2016    subtotalled LAD with right to left collaterals    CATARACT EXTRACTION W/  INTRAOCULAR LENS IMPLANT Left     Dr Coleman     CYSTOSCOPIC LITHOLAPAXY N/A 6/27/2019    Procedure: CYSTOLITHOLAPAXY;  Surgeon: Shireen Mayo MD;  Location: [facility] OR 2ND FLR;  Service: Urology;  Laterality: N/A;    CYSTOSCOPIC LITHOLAPAXY N/A 9/3/2019    Procedure: CYSTOLITHOLAPAXY;  Surgeon: Shireen Mayo MD;  Location: [facility] OR 2ND FLR;  Service: Urology;  Laterality: N/A;    ESOPHAGOGASTRODUODENOSCOPY N/A 5/23/2018    Procedure: ESOPHAGOGASTRODUODENOSCOPY (EGD);  Surgeon: Prince Vance MD;  Location: [facility] (4TH FLR);  Service: Endoscopy;  Laterality: N/A;  r/s 'd per Dr. Vance due to family emergency- ER    HYSTERECTOMY  1975    endometriosis    pain pump placement      SQ Dilaudid Pump managed by Dr. Hillman, Pain Management    REPLACEMENT OF CATHETER N/A 10/31/2019    Procedure: REPLACEMENT, CATHETER-SUPRAPUBIC;  Surgeon: Shireen Mayo MD;  Location: [facility] OR 1ST FLR;  Service: Urology;  Laterality: N/A;    SPINAL CORD STIMULATOR REMOVAL      before [place]    SPINE SURGERY  5-13-13    CERVICAL FUSION    TONSILLECTOMY         Review of patient's allergies indicates:   Allergen Reactions    (d)-limonene flavor      Other reaction(s): difficult intubation  Other reaction(s): Difficulty breathing    Bactrim [sulfamethoxazole-trimethoprim] Anaphylaxis    Benadryl [diphenhydramine hcl] Shortness Of Breath    Imitrex [sumatriptan succinate] Shortness Of Breath    Topamax [topiramate] Shortness Of Breath    Vancomycin Shortness Of Breath     Rash    Butorphanol tartrate     Darvocet a500 [propoxyphene n-acetaminophen]      Other reaction(s): Difficulty breathing    Fentanyl      Other reaction(s): Vomiting  Other reaction(s): Nausea  Other reaction(s): Itching  swelling    White petrolatum-zinc     Zinc  oxide-white petrolatum      Other reaction(s): Difficulty breathing    Latex, natural rubber Itching and Rash    Phenytoin Rash and Other (See Comments)     Trouble breathing       Medications:  Medications Prior to Admission   Medication Sig    amoxicillin-clavulanate 500-125mg (AUGMENTIN) 500-125 mg Tab Take 1 tablet (500 mg total) by mouth 3 (three) times daily. for 7 days    ascorbic acid, vitamin C, (VITAMIN C) 500 MG tablet Take 500 mg by mouth once daily.    aspirin (ECOTRIN) 81 MG EC tablet Take 1 tablet (81 mg total) by mouth once daily.    atorvastatin (LIPITOR) 80 MG tablet TAKE 1 TABLET BY MOUTH DAILY    butalbital-acetaminophen-caffeine -40 mg (FIORICET, ESGIC) -40 mg per tablet Take 1 tablet by mouth every 4 (four) hours as needed for Headaches.     cholecalciferol, vitamin D3, (VITAMIN D3) 50 mcg (2,000 unit) Cap Take 1 capsule (2,000 Units total) by mouth once daily.    ergocalciferol (ERGOCALCIFEROL) 50,000 unit Cap Take 1 capsule (50,000 Units total) by mouth every 7 days. for 12 doses    ferrous sulfate (FEOSOL) 325 mg (65 mg iron) Tab tablet Take 1 tablet (325 mg total) by mouth once daily.    fluconazole (DIFLUCAN) 100 MG tablet 2 tabs day 1; then 1 tab daily until gone; 7 days of treatment    FLUoxetine 20 MG capsule Take 3 capsules (60 mg total) by mouth once daily.    furosemide (LASIX) 40 MG tablet Take 3 tablets (120 mg total) by mouth once daily. Two in the morning and one in the evening    gabapentin (NEURONTIN) 100 MG capsule Take 1 capsule (100 mg total) by mouth 3 (three) times daily.    HYDROcodone-acetaminophen (NORCO)  mg per tablet Take 1 tablet by mouth every 6 (six) hours as needed for Pain.    intrathecal pain pump compound Hydromorphone (7.5 mg/mL) infusion at 3.6799 mg/day (0.1533 mg/hr) out of a total reservoir volume of 37.3 mL    levothyroxine (SYNTHROID) 125 MCG tablet Take 1 tablet (125 mcg total) by mouth before breakfast.    lidocaine  (LIDODERM) 5 % Place 1 patch onto the skin daily as needed (pain). USE AS DIRECTED WEAR FOR A MAXIMUM OF 12 HOURS    oxybutynin (DITROPAN XL) 15 MG TR24 Take 15 mg by mouth once daily.    oxyCODONE-acetaminophen (PERCOCET)  mg per tablet     pantoprazole (PROTONIX) 40 MG tablet Take 1 tablet (40 mg total) by mouth once daily.    polyethylene glycol (GLYCOLAX) 17 gram PwPk Take 17 g by mouth once daily.    potassium chloride SA (K-DUR,KLOR-CON) 10 MEQ tablet     potassium citrate (UROCIT-K) 10 mEq (1,080 mg) TbSR Take 1 tablet (10 mEq total) by mouth once daily.    promethazine (PHENERGAN) 25 MG tablet Take 25 mg by mouth every 6 (six) hours as needed for Nausea.    QUEtiapine (SEROQUEL) 100 MG Tab TAKE 1 TABLET (100 MG TOTAL) BY MOUTH EVERY EVENING.    QUEtiapine (SEROQUEL) 25 MG Tab Take 12.5-25 mg twice daily as needed for anxiety    senna (SENNA LAX) 8.6 mg tablet Take 2 tablets by mouth 2 (two) times daily.    tiZANidine (ZANAFLEX) 4 MG tablet     torsemide (DEMADEX) 10 MG Tab Take 1 tablet (10 mg total) by mouth once daily. for 7 days    traZODone (DESYREL) 100 MG tablet Take 3 tablets (300 mg total) by mouth every evening.     Antibiotics (From admission, onward)    Start     Stop Route Frequency Ordered    05/13/20 0730  linezolid 600 mg/300 mL IVPB 600 mg      -- IV Every 12 hours (non-standard times) 05/12/20 2121        Antifungals (From admission, onward)    None        Antivirals (From admission, onward)    None           Immunization History   Administered Date(s) Administered    Influenza 10/22/2008, 12/23/2010, 10/13/2011    Influenza - Quadrivalent 11/14/2014, 09/25/2015    Influenza - Quadrivalent - PF (6 months and older) 10/22/2008, 12/23/2010, 10/13/2011, 11/30/2017, 09/21/2018, 09/21/2018, 11/07/2019    Pneumococcal Conjugate - 13 Valent 05/29/2014, 05/29/2014, 12/29/2017    Pneumococcal Polysaccharide - 23 Valent 05/17/2018, 05/17/2018    Tdap 12/17/2015, 03/02/2017        Family History     Problem Relation (Age of Onset)    Cancer Mother (55), Father    Cirrhosis Paternal Aunt, Paternal Uncle    Colon cancer Maternal Uncle    Esophageal cancer Father    Heart disease Maternal Uncle    Liver disease Paternal Aunt, Paternal Uncle    No Known Problems Brother, Brother, Sister, Maternal Aunt, Maternal Grandfather, Paternal Grandmother, Paternal Grandfather    Parkinsonism Maternal Grandmother    Tremor Maternal Grandmother        Social History     Socioeconomic History    Marital status:      Spouse name: Not on file    Number of children: Not on file    Years of education: Not on file    Highest education level: Not on file   Occupational History    Not on file   Social Needs    Financial resource strain: Not on file    Food insecurity:     Worry: Not on file     Inability: Not on file    Transportation needs:     Medical: Not on file     Non-medical: Not on file   Tobacco Use    Smoking status: Never Smoker    Smokeless tobacco: Never Used   Substance and Sexual Activity    Alcohol use: Never     Frequency: Never    Drug use: No    Sexual activity: Never     Partners: Male   Lifestyle    Physical activity:     Days per week: Not on file     Minutes per session: Not on file    Stress: Not on file   Relationships    Social connections:     Talks on phone: Not on file     Gets together: Not on file     Attends Gnosticism service: Not on file     Active member of club or organization: Not on file     Attends meetings of clubs or organizations: Not on file     Relationship status: Not on file   Other Topics Concern    Are you pregnant or think you may be? Not Asked    Breast-feeding Not Asked   Social History Narrative    Not on file     Review of Systems   Constitutional: Positive for activity change and fatigue. Negative for appetite change, chills, diaphoresis and fever.   Respiratory: Negative for cough and shortness of breath.    Cardiovascular:  Negative for chest pain and leg swelling.   Gastrointestinal: Positive for abdominal pain. Negative for abdominal distention, blood in stool, constipation, nausea and vomiting.   Genitourinary: Negative for difficulty urinating and dysuria.   Musculoskeletal: Negative for back pain.   Skin: Positive for wound. Negative for color change, pallor and rash.        +suprapubic cath with dry blood   Neurological: Negative for dizziness and headaches.   All other systems reviewed and are negative.    Objective:     Vital Signs (Most Recent):  Temp: 98 °F (36.7 °C) (05/12/20 1724)  Pulse: (!) 47 (05/13/20 1023)  Resp: 12 (05/13/20 0900)  BP: (!) 87/44 (05/13/20 1023)  SpO2: 97 % (05/13/20 0900) Vital Signs (24h Range):  Temp:  [98 °F (36.7 °C)] 98 °F (36.7 °C)  Pulse:  [43-54] 47  Resp:  [12-20] 12  SpO2:  [95 %-100 %] 97 %  BP: ()/(44-58) 87/44     Weight: 77.3 kg (170 lb 5.9 oz)  Body mass index is 26.29 kg/m².    Estimated Creatinine Clearance: 89.1 mL/min (based on SCr of 0.7 mg/dL).    Physical Exam   Constitutional: She is oriented to person, place, and time. She appears well-developed and well-nourished.   HENT:   Head: Normocephalic.   Eyes: Pupils are equal, round, and reactive to light.   Cardiovascular: Normal rate and regular rhythm. Exam reveals no friction rub.   No murmur heard.  Pulmonary/Chest: Effort normal. No stridor. No respiratory distress.   Abdominal: Soft. She exhibits no distension and no mass. There is tenderness. There is no rebound and no guarding. No hernia.   +suprapubic catheter with dry blood. No active bleeding. No purulent drainage   Musculoskeletal: She exhibits no edema, tenderness or deformity.   Neurological: She is alert and oriented to person, place, and time.   Skin: Skin is warm and dry. She is not diaphoretic. No erythema. No pallor.   Psychiatric: She has a normal mood and affect.   Vitals reviewed.      Significant Labs: All pertinent labs within the past 24 hours have  been reviewed.    Significant Imaging: I have reviewed all pertinent imaging results/findings within the past 24 hours.

## 2020-05-13 NOTE — HPI
Ms. Hawley is a 63 yo F with a PMH of neurogenic bladder s/p SPT placement , as well as autologous fascial sling placement in 09/2019. She has had recurrent UTIs. She was having suprapubic pressure, so her SPT was changed by Home Health on 5/5 and urine was sent for culture - grew MRSA. She is afebrile with stable vitals and normal WBC. She was admitted on 5/12 for antibiotic treatment and has been started on linezolid - allergic to Bactrim and Vanc. Urology was consulted for bloody drainage at SPT site. Cr is 0.7 (baseline). UA shows many bacteria, >100 WBC, 22 RBC. Patient reports lower back pain, not flank pain, as well as suprapubic pressure and tenderness.

## 2020-05-13 NOTE — HPI
63 yo F with PMHx neurogenic bladder with suprapubic catheter, chronic back pain since a work accident in 1997 (has had 12 back surgeries) on dilaudid pump, CAD, hypothyroidism, sleep apnea, and GONZALO who presents to the ED seeking treatment for her MRSA UTI. The patient reports that she has been having suprapubic pressure and burning for the last 10 days. She also reports associated malaise and chills for the last 3 days. The patient lives in [place], but she has HH that changes her suprapubic catheter every 3 weeks. She reports that she called her urologist 5/4 about her symptoms, and her urologist recommended having her catheter change to obtain a clean urinary sample. She had her suprapubic catheter exchanged on 5/5 and was prescribed augmentin for empiric treatment.  The patient reports that she only took one day of the augmentin before her urologist called and informed her that she had MRSA. Since then the patient reports developing worsening pain/burning as well as the chills and malaise. She denies any diarrhea, nausea, vomiting, or flank pain. She was started on empiric linezolid. Her repeat UA here with >100 wbcs. Urine cultures are pending. She has no fever chills or night sweats. Urology following, no active bleeding from suprapubic catheter site. No urologic intervention.     She reports having yellow green drainage bleeding and tenderness from her suprapubic catheter site for two weeks. No fevers at home. No animals at home. No recent travel, no outdoor activities.

## 2020-05-14 LAB
ANION GAP SERPL CALC-SCNC: 6 MMOL/L (ref 8–16)
BACTERIA UR CULT: NORMAL
BACTERIA UR CULT: NORMAL
BASOPHILS # BLD AUTO: 0.01 K/UL (ref 0–0.2)
BASOPHILS NFR BLD: 0.2 % (ref 0–1.9)
BUN SERPL-MCNC: 5 MG/DL (ref 8–23)
CALCIUM SERPL-MCNC: 8.6 MG/DL (ref 8.7–10.5)
CHLORIDE SERPL-SCNC: 107 MMOL/L (ref 95–110)
CO2 SERPL-SCNC: 28 MMOL/L (ref 23–29)
CREAT SERPL-MCNC: 0.8 MG/DL (ref 0.5–1.4)
DIFFERENTIAL METHOD: ABNORMAL
EOSINOPHIL # BLD AUTO: 0.1 K/UL (ref 0–0.5)
EOSINOPHIL NFR BLD: 2.6 % (ref 0–8)
ERYTHROCYTE [DISTWIDTH] IN BLOOD BY AUTOMATED COUNT: 14 % (ref 11.5–14.5)
EST. GFR  (AFRICAN AMERICAN): >60 ML/MIN/1.73 M^2
EST. GFR  (NON AFRICAN AMERICAN): >60 ML/MIN/1.73 M^2
GLUCOSE SERPL-MCNC: 91 MG/DL (ref 70–110)
HCT VFR BLD AUTO: 35.2 % (ref 37–48.5)
HGB BLD-MCNC: 10.8 G/DL (ref 12–16)
IMM GRANULOCYTES # BLD AUTO: 0.01 K/UL (ref 0–0.04)
IMM GRANULOCYTES NFR BLD AUTO: 0.2 % (ref 0–0.5)
LYMPHOCYTES # BLD AUTO: 1.5 K/UL (ref 1–4.8)
LYMPHOCYTES NFR BLD: 33.3 % (ref 18–48)
MAGNESIUM SERPL-MCNC: 2 MG/DL (ref 1.6–2.6)
MCH RBC QN AUTO: 28.1 PG (ref 27–31)
MCHC RBC AUTO-ENTMCNC: 30.7 G/DL (ref 32–36)
MCV RBC AUTO: 91 FL (ref 82–98)
MONOCYTES # BLD AUTO: 0.5 K/UL (ref 0.3–1)
MONOCYTES NFR BLD: 10.3 % (ref 4–15)
NEUTROPHILS # BLD AUTO: 2.4 K/UL (ref 1.8–7.7)
NEUTROPHILS NFR BLD: 53.4 % (ref 38–73)
NRBC BLD-RTO: 0 /100 WBC
PLATELET # BLD AUTO: 179 K/UL (ref 150–350)
PMV BLD AUTO: 10.7 FL (ref 9.2–12.9)
POTASSIUM SERPL-SCNC: 4.3 MMOL/L (ref 3.5–5.1)
RBC # BLD AUTO: 3.85 M/UL (ref 4–5.4)
SODIUM SERPL-SCNC: 141 MMOL/L (ref 136–145)
WBC # BLD AUTO: 4.57 K/UL (ref 3.9–12.7)

## 2020-05-14 PROCEDURE — 25000003 PHARM REV CODE 250: Mod: HCNC | Performed by: PHYSICIAN ASSISTANT

## 2020-05-14 PROCEDURE — 97165 OT EVAL LOW COMPLEX 30 MIN: CPT | Mod: HCNC

## 2020-05-14 PROCEDURE — 25500020 PHARM REV CODE 255: Mod: HCNC | Performed by: INTERNAL MEDICINE

## 2020-05-14 PROCEDURE — 80048 BASIC METABOLIC PNL TOTAL CA: CPT | Mod: HCNC

## 2020-05-14 PROCEDURE — 97530 THERAPEUTIC ACTIVITIES: CPT | Mod: HCNC

## 2020-05-14 PROCEDURE — 99233 SBSQ HOSP IP/OBS HIGH 50: CPT | Mod: HCNC,,, | Performed by: INTERNAL MEDICINE

## 2020-05-14 PROCEDURE — 99233 SBSQ HOSP IP/OBS HIGH 50: CPT | Mod: HCNC,,, | Performed by: STUDENT IN AN ORGANIZED HEALTH CARE EDUCATION/TRAINING PROGRAM

## 2020-05-14 PROCEDURE — 99233 PR SUBSEQUENT HOSPITAL CARE,LEVL III: ICD-10-PCS | Mod: HCNC,,, | Performed by: INTERNAL MEDICINE

## 2020-05-14 PROCEDURE — 99233 PR SUBSEQUENT HOSPITAL CARE,LEVL III: ICD-10-PCS | Mod: HCNC,,, | Performed by: STUDENT IN AN ORGANIZED HEALTH CARE EDUCATION/TRAINING PROGRAM

## 2020-05-14 PROCEDURE — 63600175 PHARM REV CODE 636 W HCPCS: Mod: HCNC | Performed by: HOSPITALIST

## 2020-05-14 PROCEDURE — 93010 ELECTROCARDIOGRAM REPORT: CPT | Mod: HCNC,,, | Performed by: INTERNAL MEDICINE

## 2020-05-14 PROCEDURE — 63600175 PHARM REV CODE 636 W HCPCS: Mod: JG,HCNC | Performed by: PHYSICIAN ASSISTANT

## 2020-05-14 PROCEDURE — 83735 ASSAY OF MAGNESIUM: CPT | Mod: HCNC

## 2020-05-14 PROCEDURE — 85025 COMPLETE CBC W/AUTO DIFF WBC: CPT | Mod: HCNC

## 2020-05-14 PROCEDURE — 11000001 HC ACUTE MED/SURG PRIVATE ROOM: Mod: HCNC

## 2020-05-14 PROCEDURE — 93010 EKG 12-LEAD: ICD-10-PCS | Mod: HCNC,,, | Performed by: INTERNAL MEDICINE

## 2020-05-14 PROCEDURE — 97161 PT EVAL LOW COMPLEX 20 MIN: CPT | Mod: HCNC

## 2020-05-14 PROCEDURE — 25000003 PHARM REV CODE 250: Mod: HCNC | Performed by: HOSPITALIST

## 2020-05-14 PROCEDURE — 36415 COLL VENOUS BLD VENIPUNCTURE: CPT | Mod: HCNC

## 2020-05-14 PROCEDURE — 93005 ELECTROCARDIOGRAM TRACING: CPT | Mod: HCNC

## 2020-05-14 RX ADMIN — Medication 6 MG: at 08:05

## 2020-05-14 RX ADMIN — ENOXAPARIN SODIUM 40 MG: 100 INJECTION SUBCUTANEOUS at 04:05

## 2020-05-14 RX ADMIN — OXYBUTYNIN CHLORIDE 15 MG: 15 TABLET, EXTENDED RELEASE ORAL at 09:05

## 2020-05-14 RX ADMIN — HYDROCODONE BITARTRATE AND ACETAMINOPHEN 1 TABLET: 10; 325 TABLET ORAL at 08:05

## 2020-05-14 RX ADMIN — ATORVASTATIN CALCIUM 80 MG: 20 TABLET, FILM COATED ORAL at 09:05

## 2020-05-14 RX ADMIN — HYDROCODONE BITARTRATE AND ACETAMINOPHEN 1 TABLET: 10; 325 TABLET ORAL at 05:05

## 2020-05-14 RX ADMIN — DAPTOMYCIN 620 MG: 350 INJECTION, POWDER, LYOPHILIZED, FOR SOLUTION INTRAVENOUS at 03:05

## 2020-05-14 RX ADMIN — POLYETHYLENE GLYCOL 3350 17 G: 17 POWDER, FOR SOLUTION ORAL at 11:05

## 2020-05-14 RX ADMIN — LEVOTHYROXINE SODIUM 125 MCG: 125 TABLET ORAL at 05:05

## 2020-05-14 RX ADMIN — ONDANSETRON 4 MG: 2 INJECTION INTRAMUSCULAR; INTRAVENOUS at 02:05

## 2020-05-14 RX ADMIN — TRAZODONE HYDROCHLORIDE 300 MG: 100 TABLET ORAL at 08:05

## 2020-05-14 RX ADMIN — PROCHLORPERAZINE EDISYLATE 5 MG: 5 INJECTION INTRAMUSCULAR; INTRAVENOUS at 11:05

## 2020-05-14 RX ADMIN — FLUOXETINE 60 MG: 20 CAPSULE ORAL at 09:05

## 2020-05-14 RX ADMIN — PANTOPRAZOLE SODIUM 40 MG: 40 TABLET, DELAYED RELEASE ORAL at 09:05

## 2020-05-14 RX ADMIN — ASPIRIN 81 MG: 81 TABLET, DELAYED RELEASE ORAL at 09:05

## 2020-05-14 RX ADMIN — SENNOSIDES 8.6 MG: 8.6 TABLET, FILM COATED ORAL at 11:05

## 2020-05-14 RX ADMIN — HYDROCODONE BITARTRATE AND ACETAMINOPHEN 1 TABLET: 10; 325 TABLET ORAL at 09:05

## 2020-05-14 RX ADMIN — QUETIAPINE FUMARATE 100 MG: 25 TABLET ORAL at 08:05

## 2020-05-14 RX ADMIN — IOHEXOL 75 ML: 350 INJECTION, SOLUTION INTRAVENOUS at 12:05

## 2020-05-14 RX ADMIN — HYDROCODONE BITARTRATE AND ACETAMINOPHEN 1 TABLET: 10; 325 TABLET ORAL at 02:05

## 2020-05-14 RX ADMIN — ONDANSETRON 4 MG: 2 INJECTION INTRAMUSCULAR; INTRAVENOUS at 10:05

## 2020-05-14 RX ADMIN — ONDANSETRON 4 MG: 2 INJECTION INTRAMUSCULAR; INTRAVENOUS at 06:05

## 2020-05-14 NOTE — PROGRESS NOTES
Hospital Medicine  Progress Note      Patient Name: Tasha Hawley  MRN: [id number]  Date of Admission: 5/12/2020     Principal Problem: Urinary tract infection associated with cystostomy catheter     Subjective     Pt afebrile and HD stable overnight. However, this AM SBP in 90's, lasix held. EKG ordered showing sinus bradycardia and patient asymptomatic. 2D echo without any vegetations, blood cultures NGTD, CT A/P without any fluid collections/abscess. Continuing daptomycin per ID recs.       Review of Systems     Constitutional: Negative for chills, fatigue, fever.   HENT: Negative for sore throat, trouble swallowing.    Eyes: Negative for photophobia, visual disturbance.   Respiratory: Negative for cough, shortness of breath.    Cardiovascular: Negative for , palpitations, leg swelling. Positive for chest pain  Gastrointestinal: Negative for abdominal pain, constipation, diarrhea, nausea, vomiting.   Endocrine: Negative for cold intolerance, heat intolerance.   Genitourinary: Negative for dysuria, frequency.   Musculoskeletal: +arthralgias, +myalgias.   Skin: Negative for rash, wound, erythema   Neurological: Negative for dizziness, syncope, weakness, light-headedness. +headache.  Psychiatric/Behavioral: Negative for confusion, hallucinations, anxiety    Medications  Scheduled Meds:   aspirin  81 mg Oral Daily    atorvastatin  80 mg Oral Daily    DAPTOmycin (CUBICIN)  IV  8 mg/kg Intravenous Q24H    enoxaparin  40 mg Subcutaneous Daily    FLUoxetine  60 mg Oral Daily    furosemide  80 mg Oral Daily    levothyroxine  125 mcg Oral Before breakfast    oxybutynin  15 mg Oral Daily    pantoprazole  40 mg Oral Daily    polyethylene glycol  17 g Oral Daily    QUEtiapine  100 mg Oral Nightly    traZODone  300 mg Oral QHS     Continuous Infusions:  PRN Meds:.albuterol-ipratropium, glucose, glucose, HYDROcodone-acetaminophen, melatonin, ondansetron, prochlorperazine, senna, sodium chloride  0.9%    Objective    Physical Examination    Temp:  [97.4 °F (36.3 °C)-98 °F (36.7 °C)]   Pulse:  [42-54]   Resp:  [13-17]   BP: ()/(45-64)   SpO2:  [96 %-97 %]     Gen: NAD, conversant, ill-appearing  Head: NC, AT  Eyes: PERRLA, EOMI  Throat: MMM, OP clear  CV: RRR, no M/R/G, + peripheral edema, no JVD  Resp: CTAB, no increased work of breathing on room air  GI: Soft, NT, ND, +BS- suprapubic catheter in place with dry blood at site. Palpable induration at RLQ at site of subcutaneous dilaudid pump.  Ext: MAEW, no c/c/e  Neuro: AAOx3, CN grossly intact, no focal neurologic deficits  Psychiatry: Normal mood, normal affect, no SI/HI    CBC  Recent Labs   Lab 05/12/20 1914 05/13/20 0327 05/14/20  0426   WBC 5.44 5.09 4.57   HGB 11.5* 10.5* 10.8*   HCT 37.4 33.4* 35.2*    168 179     CMP  Recent Labs   Lab 05/12/20 1914 05/13/20 0327 05/14/20  0426    140 141   K 3.3* 3.2* 4.3    101 107   CO2 33* 33* 28   BUN 7* 7* 5*   CREATININE 0.8 0.7 0.8   GLU 90 109 91   CALCIUM 9.1 8.7 8.6*   MG  --  2.0 2.0   ALKPHOS 99  --   --    ALT 5*  --   --    AST 12  --   --    ALBUMIN 3.6  --   --    PROT 6.8  --   --    BILITOT 0.4  --   --            Hospital Course:  Admitted for symptomatic MRSA UTI in the setting of a suprapubic catheter. On daptomycin per ID recs. Workup for source of MRSA pending. 2D echo without any vegetations, blood cultures NGTD, CT A/P without any fluid collections/abscess.       Assessment and Plan:    MRSA UTI associated with cystostomy catheter  Neurogenic Bladdder  -Suprapubic pressure and burning reported, urine cx from 5/8 showing MRSA.  -Pt. Afebrile, hemodynamically stable, WBC normal.  -MRSA infection complicated by patient's allergies to bactrim and vancomycin. Linezolid given in ED  --ID consulted and switched to dapto; 2D echo without any vegetations, blood cultures NGTD, CT A/P without any fluid collections/abscess  -Urology consulted in ED for management of  suprapubic catheter (bloody drainage noted); no need for exchange at this time per uro  --Plan to continue daptomycin for 14 days; will order PICC tomorrow when blood cultures have remained negative for 48 hours     Lymphedema of both lower extremities  - Chronic issue that patient has had for years  - Continue home lasix     Primary hypothyroidism  - Continue home levothyroxine     Coronary artery disease involving native coronary artery of native heart without angina pectoris  Pure hypercholesterolemia   - Continue ASA 81 mg and statin     GERD (gastroesophageal reflux disease)  -Continue home med 40 mg pantoprazole     Chronic pain syndrome  Narcotic dependency  - Patient currently has dilaudid pump in place and uses Norco  mg at home for breakthrough pain  - Will continue home meds  - Miralax, colace, and senna ordered for bowel regimen     Generalized anxiety disorder  - Continue home meds, seroquel and trazodone    Bradycardia  - EKG sinus bradycardia  - patient asymptomatic  - monitor on telemetry    Diet: reg  VTE PPX: lovenox    Goals of care: full   dispo: home w hh likely tomorrow    Cristiane Villatoro M.D.  Department of Hospital Medicine  [facility]  [phone] (pager)

## 2020-05-14 NOTE — PLAN OF CARE
Problem: Physical Therapy Goal  Goal: Physical Therapy Goal  Description  Goals to be met by: 20     Patient will increase functional independence with mobility by performin. Sit to stand transfer with [place], from all surfaces.   2. Bed to chair transfer with Supervision using Rolling Walker.  3. Gait  x 75 feet with Supervision using Rolling Walker.   4. Ascend/Descend 6 inch curb step with Contact Guard Assistance using Rolling Walker.  5. Lower extremity exercise program x 20 reps per handout, with assistance as needed.     Outcome: Ongoing, Progressing    PT goals established.

## 2020-05-14 NOTE — PT/OT/SLP EVAL
Occupational Therapy   Evaluation    Name: Tasha Hawley  MRN: [id number]  Admitting Diagnosis:  Urinary tract infection associated with cystostomy catheter      Recommendations:     Discharge Recommendations: home health OT  Discharge Equipment Recommendations:  none  Barriers to discharge:  None    Assessment:     Tasha Hawley is a 64 y.o. female with a medical diagnosis of Urinary tract infection associated with cystostomy catheter. Pt. currently demonstrates decreased (I) with ADLs, functional mobility & t/fs as well as decreased overall strength, ROM, endurance and balance. Demonstrates decreased functional mobility with bent-over posture due to back pain (has had 12 back surgeries). Performance deficits affecting function: weakness, gait instability, impaired endurance, impaired balance, pain, impaired self care skills, impaired functional mobilty, decreased coordination, decreased safety awareness, impaired cognition.      Rehab Prognosis: Good; patient would benefit from acute skilled OT services to address these deficits and reach maximum level of function.       Plan:     Patient to be seen 3 x/week to address the above listed problems via self-care/home management, therapeutic exercises, therapeutic activities  · Plan of Care Expires: 06/13/20  · Plan of Care Reviewed with: patient    Subjective     Occupational Profile:  Living Environment: Pt lives with spouse, SSH, ramp present.    Prior to admission, patients level of function required use of rollator to amb and assist with dressing/bathing from . Upon discharge, patient will have assistance from spouse.  Equipment Used at Home:  rollator, bedside commode, shower chair, w/c    Pain/Comfort:  · Pain Rating 1: 9/10  · Location 1: back    Patients cultural, spiritual, Adventist conflicts given the current situation:      Objective:     Communicated with: rn prior to session.  Patient found supine with (suprapubic catheter) upon OT entry  to room.    General Precautions: Standard, fall, contact   Orthopedic Precautions:    Braces:       Occupational Performance:    Bed Mobility:    · Patient completed Rolling/Turning to Right with supervision  · Patient completed Scooting/Bridging with supervision  · Patient completed Supine to Sit with supervision  · Patient completed Sit to Supine with supervision    Functional Mobility/Transfers:  · Patient completed Sit <> Stand Transfer with stand by assistance  with  rolling walker   · Patient completed Toilet Transfer Step Transfer technique with supervision with  rolling walker  · Functional Mobility: SBA with a RW to bathroom.    Activities of Daily Living:  · Grooming: supervision seated  · Lower Body Dressing: stand by assistance for slipper donning.    Cognitive/Visual Perceptual:  Cognitive/Psychosocial Skills:     -       Oriented to: Person, Place, Time and Situation   -       Safety awareness/insight to disability: intact     Physical Exam:  BUE AROM/MMT: WFL but reports pain during MMT so backed off.    AMPAC 6 Click ADL:  AMPAC Total Score: 20    Treatment & Education:  UE ROM/MMT  Bed mobility training / assessment  Functional mobility assessment  Sit/standing balance assessment  Educated on importance of sitting OOB in bedside chair to promote increased strength, endurance & breathing.  Discussed OT POC / Post-acute plan  Education:    Patient left supine with all lines intact and call button in reach    GOALS:   Multidisciplinary Problems     Occupational Therapy Goals        Problem: Occupational Therapy Goal    Goal Priority Disciplines Outcome Interventions   Occupational Therapy Goal     OT, PT/OT Ongoing, Progressing    Description:  Goals to be met by: 5/21/20     Patient will increase functional independence with ADLs by performing:    UE Dressing with Set-up Assistance.  Grooming while standing at sink with Set-up Assistance.  Toileting from toilet with Modified [place] for hygiene  and clothing management.   Toilet transfer to toilet with Modified [place].                      History:     Past Medical History:   Diagnosis Date    Anticoagulant long-term use     Anxiety     Arthritis     Bilateral lower extremity edema     severe chronic    Carotid artery occlusion     Cataract     Coronary artery disease     subtotalled LAD with collateral    Depression     Fever blister     Hypothyroid     Iron deficiency anemia     Lumbar radiculopathy     with chronic pain    Ocular migraine     Sleep apnea     cpap       Past Surgical History:   Procedure Laterality Date    ADENOIDECTOMY      BACK SURGERY      x 12    CARDIAC CATHETERIZATION  2016    subtotalled LAD with right to left collaterals    CATARACT EXTRACTION W/  INTRAOCULAR LENS IMPLANT Left     Dr Coleman     CYSTOSCOPIC LITHOLAPAXY N/A 6/27/2019    Procedure: CYSTOLITHOLAPAXY;  Surgeon: Shireen Mayo MD;  Location: [facility] OR 2ND FLR;  Service: Urology;  Laterality: N/A;    CYSTOSCOPIC LITHOLAPAXY N/A 9/3/2019    Procedure: CYSTOLITHOLAPAXY;  Surgeon: Shireen Mayo MD;  Location: [facility] OR 2ND FLR;  Service: Urology;  Laterality: N/A;    ESOPHAGOGASTRODUODENOSCOPY N/A 5/23/2018    Procedure: ESOPHAGOGASTRODUODENOSCOPY (EGD);  Surgeon: Prince Vance MD;  Location: [facility] (4TH FLR);  Service: Endoscopy;  Laterality: N/A;  r/s 'd per Dr. Vance due to family emergency- ER    HYSTERECTOMY  1975    endometriosis    pain pump placement      SQ Dilaudid Pump managed by Dr. Hillman, Pain Management    REPLACEMENT OF CATHETER N/A 10/31/2019    Procedure: REPLACEMENT, CATHETER-SUPRAPUBIC;  Surgeon: Shireen Mayo MD;  Location: [facility] OR 1ST FLR;  Service: Urology;  Laterality: N/A;    SPINAL CORD STIMULATOR REMOVAL      before Larissa    SPINE SURGERY  5-13-13    CERVICAL FUSION    TONSILLECTOMY         Time Tracking:     OT Date of Treatment: 05/14/20  OT Start Time: 1323  OT Stop Time: 1341  OT Total Time  (min): 18 min    Billable Minutes:Evaluation 10  Therapeutic Activity 8    JEREMY Oh  5/14/2020

## 2020-05-14 NOTE — CONSULTS
[facility] Hospital Medicine  Telemedicine Consult Note    Patient Name: Tasha Hawley  MRN: [id number]  Admission Date: 5/12/2020  Hospital Length of Stay: 2 days  Attending Physician: Cristiaen Villatoro MD   Primary Care Provider: Mesfin Hodges Ii, MD     Mrs. Tasha Hawley has been accepted for transfer to [facility] and will be followed through telemedicine services beginning 05/15/20 at 7 AM.      Rebecca Chery MD  Department of Hospital Medicine   [facility]

## 2020-05-14 NOTE — PROGRESS NOTES
[facility]  Infectious Disease  Progress Note    Patient Name: Tasha Hawley  MRN: [id number]  Admission Date: 5/12/2020  Length of Stay: 2 days  Attending Physician: Cristiane Villatoro MD  Primary Care Provider: Mesfin Hodges Ii, MD    Isolation Status: Contact  Assessment/Plan:      * Urinary tract infection associated with cystostomy catheter  64-year-old female with past medical history of neurogenic bladder with suprapubic catheter, chronic back pain on dilaudid pump, CAD, hypothyroidism, sleep apnea, and GONZALO who presents with two weeks of pain and discharge from suprapubic catheter, found to have MRSA in urine cultures.      Suspect MRSA from skin caused infection of catheter tract along with urinary tract infection.  CT abd/pelvis with no evidence of abscess surrounding catheter.  Blood cultures are negative, TTE negative suggesting no systemic source of MRSA.  Suprapublic catheter was exchanged on 5/5, urology consulted and does not recommend any additional intervention.    Recommendations  - Place midline once blood cultures negative x 48 hours  - Complete 14 day course of daptomycin 620 mg IV q24 hours, last day is 5/25/2020    End date of IV antibiotics: 5/25/2020    Weekly outpatient laboratory on Monday or Tuesday while on IV antibiotics.    CBC   CMP    CPK      Fax laboratory results to [facility] at [phone] with attn: CARMEN                    Thank you for your consult. I will sign off. Please contact us if you have any additional questions.    Cora Mckinnno MD  Infectious Disease  [facility]    Subjective:     Principal Problem:Urinary tract infection associated with cystostomy catheter    HPI: 63 yo F with PMHx neurogenic bladder with suprapubic catheter, chronic back pain since a work accident in 1997 (has had 12 back surgeries) on dilaudid pump, CAD, hypothyroidism, sleep apnea, and GONZALO who presents to the ED seeking treatment for her MRSA  UTI. The patient reports that she has been having suprapubic pressure and burning for the last 10 days. She also reports associated malaise and chills for the last 3 days. The patient lives in [place], but she has HH that changes her suprapubic catheter every 3 weeks. She reports that she called her urologist 5/4 about her symptoms, and her urologist recommended having her catheter change to obtain a clean urinary sample. She had her suprapubic catheter exchanged on 5/5 and was prescribed augmentin for empiric treatment.  The patient reports that she only took one day of the augmentin before her urologist called and informed her that she had MRSA. Since then the patient reports developing worsening pain/burning as well as the chills and malaise. She denies any diarrhea, nausea, vomiting, or flank pain. She was started on empiric linezolid. Her repeat UA here with >100 wbcs. Urine cultures are pending. She has no fever chills or night sweats. Urology following, no active bleeding from suprapubic catheter site. No urologic intervention.     She reports having yellow green drainage bleeding and tenderness from her suprapubic catheter site for two weeks. No fevers at home. No animals at home. No recent travel, no outdoor activities.   Interval History:   No acute events overnight  No fevers overnight  Patient reports no improvement in symptoms compared to admission  Reports ongoing nausea, bladder pressure    Review of Systems   Constitutional: Negative for chills, diaphoresis and fever.   HENT: Negative for rhinorrhea and sore throat.    Respiratory: Negative for cough and shortness of breath.    Cardiovascular: Negative for chest pain and leg swelling.   Gastrointestinal: Positive for nausea. Negative for abdominal pain, diarrhea and vomiting.   Genitourinary: Positive for pelvic pain. Negative for dysuria and hematuria.   Musculoskeletal: Negative for arthralgias and myalgias.   Skin: Negative for rash.    Neurological: Negative for headaches.     Objective:     Vital Signs (Most Recent):  Temp: 97.9 °F (36.6 °C) (05/14/20 1146)  Pulse: (!) 54 (05/14/20 1153)  Resp: 16 (05/14/20 0947)  BP: (!) 117/55 (05/14/20 1153)  SpO2: 96 % (05/14/20 0947) Vital Signs (24h Range):  Temp:  [97.4 °F (36.3 °C)-98 °F (36.7 °C)] 97.9 °F (36.6 °C)  Pulse:  [42-54] 54  Resp:  [13-17] 16  SpO2:  [96 %-97 %] 96 %  BP: ()/(45-64) 117/55     Weight: 77.1 kg (170 lb)  Body mass index is 26.63 kg/m².    Estimated Creatinine Clearance: 77 mL/min (based on SCr of 0.8 mg/dL).    Physical Exam   Constitutional: She is oriented to person, place, and time. She appears well-developed and well-nourished. No distress.   HENT:   Head: Normocephalic and atraumatic.   Eyes: Conjunctivae and EOM are normal.   Neck: Normal range of motion. Neck supple.   Pulmonary/Chest: Effort normal. No respiratory distress.   Abdominal: Soft. She exhibits no distension.   Musculoskeletal: Normal range of motion. She exhibits no edema.   Neurological: She is alert and oriented to person, place, and time.   Skin: Skin is warm and dry. No rash noted. She is not diaphoretic. No erythema.   Psychiatric: She has a normal mood and affect. Her behavior is normal.   Vitals reviewed.      Significant Labs: All pertinent labs within the past 24 hours have been reviewed.    Significant Imaging: I have reviewed all pertinent imaging results/findings within the past 24 hours.

## 2020-05-14 NOTE — ASSESSMENT & PLAN NOTE
64-year-old female with past medical history of neurogenic bladder with suprapubic catheter, chronic back pain on dilaudid pump, CAD, hypothyroidism, sleep apnea, and GONZALO who presents with two weeks of pain and discharge from suprapubic catheter, found to have MRSA in urine cultures.      Suspect MRSA from skin caused infection of catheter tract along with urinary tract infection.  CT abd/pelvis with no evidence of abscess surrounding catheter.  Blood cultures are negative, TTE negative suggesting no systemic source of MRSA.  Suprapublic catheter was exchanged on 5/5, urology consulted and does not recommend any additional intervention.    Recommendations  - Place midline once blood cultures negative x 48 hours  - Complete 14 day course of daptomycin 620 mg IV q24 hours, last day is 5/25/2020    End date of IV antibiotics: 5/25/2020    Weekly outpatient laboratory on Monday or Tuesday while on IV antibiotics.    CBC   CMP    CPK      Fax laboratory results to [facility] at [phone] with attn: CARMEN

## 2020-05-14 NOTE — PT/OT/SLP EVAL
"Physical Therapy Evaluation    Patient Name:  Tasha Hawley   MRN:  [id number]    Recommendations:     Discharge Recommendations:  home health PT   Discharge Equipment Recommendations: none   Barriers to discharge: None    Assessment:     Tasha Hawley is a 64 y.o. female admitted with a medical diagnosis of Urinary tract infection associated with cystostomy catheter.  She presents with the following impairments/functional limitations:  weakness, impaired functional mobilty, gait instability, impaired balance, impaired endurance, edema, decreased safety awareness, impaired cognition.    Upon evaluation, pt requires SBA of 1 person to amb short distances in her hospital room, with use of RW for support.  Pts gait is poor, with pt leaning over her RW at times, and fatiguing easily.  Pt has a history of multiple falls.     Rehab Prognosis: Good; patient would benefit from acute skilled PT services to address these deficits and reach maximum level of function.    Recent Surgery: * No surgery found *      Plan:     During this hospitalization, patient to be seen 3 x/week to address the identified rehab impairments via gait training, therapeutic activities, therapeutic exercises, neuromuscular re-education and progress toward the following goals:    · Plan of Care Expires:  06/11/20    Subjective     Chief Complaint: LBP and low pubic pain  Patient/Family Comments/goals: "I have a lot of pressure."  Pain/Comfort:  · Pain Rating 1: 9/10  · Location - Orientation 1: lower  · Location 1: back  · Pain Addressed 1: Nurse notified, Distraction, Reposition, Pre-medicate for activity  · Pain Rating 2: 9/10  · Location - Orientation 2: lower  · Location 2: groin  · Pain Addressed 2: Pre-medicate for activity, Reposition, Nurse notified, Distraction    Patients cultural, spiritual, Jewish conflicts given the current situation: no    Living Environment:  Pt lives with spouse, SSH, ramp present.    Prior to admission, " patients level of function required use of rollator to amb, I with ADLs.  Equipment used at home: rollator.  DME owned (not currently used): single point cane, bedside commode and wheelchair.  Upon discharge, patient will have assistance from spouse.    Objective:     Communicated with nursing prior to session.  Patient found supine with peripheral IV, telemetry, pulse ox (continuous)(suprapubic catheter)  upon PT entry to room.    General Precautions: Standard, fall, contact   Orthopedic Precautions:N/A   Braces: N/A     Exams:  · Cognitive Exam:  Patient is oriented to Person, Place, Time and Situation  · Gross Motor Coordination:  WFL  · Postural Exam:  Patient presented with the following abnormalities:    · -       No postural abnormalities identified  · Sensation:    · -       Intact  · Skin Integrity/Edema:      · -       Edema: Mild B LEs  · RUE ROM: WFL  · RUE Strength: WFL  · LUE ROM: WFL  · LUE Strength: WFL  · RLE ROM: WFL  · RLE Strength: WFL  · LLE ROM: WFL  · LLE Strength: WFL    Functional Mobility:  · Bed Mobility:     · Rolling Right: independence  · Scooting: independence  · Supine to Sit: independence  · Sit to Supine: independence  · Transfers:     · Sit to Stand:  I: from bed; SBA: from toilet with no AD  · Bed to Chair: stand by assistance with  rolling walker  using  Stand Pivot  · Toilet Transfer: stand by assistance with  rolling walker  using  Stand Pivot  · Gait: Pt amb 36', SBA, RW, no episodes of LOB, pt leaning on RW at times, notable fatigued though no JONES noted  · Balance: SBA: dynamic standing balance with AD; I: dynamic sitting balance EOB      Therapeutic Activities and Exercises:   Whiteboard updated    AM-PAC 6 CLICK MOBILITY  Total Score:18     Patient left supine with all lines intact, call button in reach and nursing notified.    GOALS:   Multidisciplinary Problems     Physical Therapy Goals        Problem: Physical Therapy Goal    Goal Priority Disciplines Outcome Goal  Variances Interventions   Physical Therapy Goal     PT, PT/OT Ongoing, Progressing     Description:  Goals to be met by: 20     Patient will increase functional independence with mobility by performin. Sit to stand transfer with [place], from all surfaces.   2. Bed to chair transfer with Supervision using Rolling Walker.  3. Gait  x 75 feet with Supervision using Rolling Walker.   4. Ascend/Descend 6 inch curb step with Contact Guard Assistance using Rolling Walker.  5. Lower extremity exercise program x 20 reps per handout, with assistance as needed.                      History:     Past Medical History:   Diagnosis Date    Anticoagulant long-term use     Anxiety     Arthritis     Bilateral lower extremity edema     severe chronic    Carotid artery occlusion     Cataract     Coronary artery disease     subtotalled LAD with collateral    Depression     Fever blister     Hypothyroid     Iron deficiency anemia     Lumbar radiculopathy     with chronic pain    Ocular migraine     Sleep apnea     cpap       Past Surgical History:   Procedure Laterality Date    ADENOIDECTOMY      BACK SURGERY      x 12    CARDIAC CATHETERIZATION      subtotalled LAD with right to left collaterals    CATARACT EXTRACTION W/  INTRAOCULAR LENS IMPLANT Left     Dr Coleman     CYSTOSCOPIC LITHOLAPAXY N/A 2019    Procedure: CYSTOLITHOLAPAXY;  Surgeon: Shrieen Mayo MD;  Location: [facility] OR [address] [place] [address];  Service: Urology;  Laterality: N/A;    CYSTOSCOPIC LITHOLAPAXY N/A 9/3/2019    Procedure: CYSTOLITHOLAPAXY;  Surgeon: Shireen Mayo MD;  Location: [facility] OR [address] [place] [address];  Service: Urology;  Laterality: N/A;    ESOPHAGOGASTRODUODENOSCOPY N/A 2018    Procedure: ESOPHAGOGASTRODUODENOSCOPY (EGD);  Surgeon: Prince Vance MD;  Location: [facility] ([address] [place] [address]);  Service: Endoscopy;  Laterality: N/A;  r/s 'd per Dr. Vance due to family emergency- ER    HYSTERECTOMY      endometriosis    pain  pump placement      SQ Dilaudid Pump managed by Dr. Hillman, Pain Management    REPLACEMENT OF CATHETER N/A 10/31/2019    Procedure: REPLACEMENT, CATHETER-SUPRAPUBIC;  Surgeon: Shireen Mayo MD;  Location: [facility] OR [address] [place] [address];  Service: Urology;  Laterality: N/A;    SPINAL CORD STIMULATOR REMOVAL      before Larissa    SPINE SURGERY  5-13-13    CERVICAL FUSION    TONSILLECTOMY         Time Tracking:     PT Received On: 05/14/20  PT Start Time: 1323     PT Stop Time: 1341  PT Total Time (min): 18 min     Billable Minutes: Evaluation 8 and Therapeutic Activity 10      Maria Guerra, PT  05/14/2020

## 2020-05-14 NOTE — NURSING
Walker ordered per patient request.  Patient seen by  PT/OT today, who also recommended use of walker.

## 2020-05-14 NOTE — SUBJECTIVE & OBJECTIVE
Interval History:   No acute events overnight  No fevers overnight  Patient reports no improvement in symptoms compared to admission  Reports ongoing nausea, bladder pressure    Review of Systems   Constitutional: Negative for chills, diaphoresis and fever.   HENT: Negative for rhinorrhea and sore throat.    Respiratory: Negative for cough and shortness of breath.    Cardiovascular: Negative for chest pain and leg swelling.   Gastrointestinal: Positive for nausea. Negative for abdominal pain, diarrhea and vomiting.   Genitourinary: Positive for pelvic pain. Negative for dysuria and hematuria.   Musculoskeletal: Negative for arthralgias and myalgias.   Skin: Negative for rash.   Neurological: Negative for headaches.     Objective:     Vital Signs (Most Recent):  Temp: 97.9 °F (36.6 °C) (05/14/20 1146)  Pulse: (!) 54 (05/14/20 1153)  Resp: 16 (05/14/20 0947)  BP: (!) 117/55 (05/14/20 1153)  SpO2: 96 % (05/14/20 0947) Vital Signs (24h Range):  Temp:  [97.4 °F (36.3 °C)-98 °F (36.7 °C)] 97.9 °F (36.6 °C)  Pulse:  [42-54] 54  Resp:  [13-17] 16  SpO2:  [96 %-97 %] 96 %  BP: ()/(45-64) 117/55     Weight: 77.1 kg (170 lb)  Body mass index is 26.63 kg/m².    Estimated Creatinine Clearance: 77 mL/min (based on SCr of 0.8 mg/dL).    Physical Exam   Constitutional: She is oriented to person, place, and time. She appears well-developed and well-nourished. No distress.   HENT:   Head: Normocephalic and atraumatic.   Eyes: Conjunctivae and EOM are normal.   Neck: Normal range of motion. Neck supple.   Pulmonary/Chest: Effort normal. No respiratory distress.   Abdominal: Soft. She exhibits no distension.   Musculoskeletal: Normal range of motion. She exhibits no edema.   Neurological: She is alert and oriented to person, place, and time.   Skin: Skin is warm and dry. No rash noted. She is not diaphoretic. No erythema.   Psychiatric: She has a normal mood and affect. Her behavior is normal.   Vitals reviewed.      Significant  Labs: All pertinent labs within the past 24 hours have been reviewed.    Significant Imaging: I have reviewed all pertinent imaging results/findings within the past 24 hours.

## 2020-05-15 LAB
ANION GAP SERPL CALC-SCNC: 9 MMOL/L (ref 8–16)
BASOPHILS # BLD AUTO: 0.02 K/UL (ref 0–0.2)
BASOPHILS NFR BLD: 0.4 % (ref 0–1.9)
BUN SERPL-MCNC: 6 MG/DL (ref 8–23)
CALCIUM SERPL-MCNC: 8.9 MG/DL (ref 8.7–10.5)
CHLORIDE SERPL-SCNC: 103 MMOL/L (ref 95–110)
CO2 SERPL-SCNC: 29 MMOL/L (ref 23–29)
CREAT SERPL-MCNC: 0.9 MG/DL (ref 0.5–1.4)
DIFFERENTIAL METHOD: ABNORMAL
EOSINOPHIL # BLD AUTO: 0.3 K/UL (ref 0–0.5)
EOSINOPHIL NFR BLD: 5.8 % (ref 0–8)
ERYTHROCYTE [DISTWIDTH] IN BLOOD BY AUTOMATED COUNT: 14.1 % (ref 11.5–14.5)
EST. GFR  (AFRICAN AMERICAN): >60 ML/MIN/1.73 M^2
EST. GFR  (NON AFRICAN AMERICAN): >60 ML/MIN/1.73 M^2
GLUCOSE SERPL-MCNC: 89 MG/DL (ref 70–110)
HCT VFR BLD AUTO: 36.9 % (ref 37–48.5)
HGB BLD-MCNC: 11 G/DL (ref 12–16)
IMM GRANULOCYTES # BLD AUTO: 0.01 K/UL (ref 0–0.04)
IMM GRANULOCYTES NFR BLD AUTO: 0.2 % (ref 0–0.5)
LYMPHOCYTES # BLD AUTO: 1.9 K/UL (ref 1–4.8)
LYMPHOCYTES NFR BLD: 42.5 % (ref 18–48)
MAGNESIUM SERPL-MCNC: 2 MG/DL (ref 1.6–2.6)
MCH RBC QN AUTO: 28 PG (ref 27–31)
MCHC RBC AUTO-ENTMCNC: 29.8 G/DL (ref 32–36)
MCV RBC AUTO: 94 FL (ref 82–98)
MONOCYTES # BLD AUTO: 0.4 K/UL (ref 0.3–1)
MONOCYTES NFR BLD: 8.9 % (ref 4–15)
NEUTROPHILS # BLD AUTO: 1.9 K/UL (ref 1.8–7.7)
NEUTROPHILS NFR BLD: 42.2 % (ref 38–73)
NRBC BLD-RTO: 0 /100 WBC
PLATELET # BLD AUTO: 170 K/UL (ref 150–350)
PMV BLD AUTO: 10.9 FL (ref 9.2–12.9)
POTASSIUM SERPL-SCNC: 4 MMOL/L (ref 3.5–5.1)
RBC # BLD AUTO: 3.93 M/UL (ref 4–5.4)
SODIUM SERPL-SCNC: 141 MMOL/L (ref 136–145)
WBC # BLD AUTO: 4.49 K/UL (ref 3.9–12.7)

## 2020-05-15 PROCEDURE — 11000001 HC ACUTE MED/SURG PRIVATE ROOM: Mod: HCNC

## 2020-05-15 PROCEDURE — 97116 GAIT TRAINING THERAPY: CPT | Mod: HCNC,CQ

## 2020-05-15 PROCEDURE — 99231 SBSQ HOSP IP/OBS SF/LOW 25: CPT | Mod: HCNC,,, | Performed by: INTERNAL MEDICINE

## 2020-05-15 PROCEDURE — 25000003 PHARM REV CODE 250: Mod: HCNC | Performed by: PHYSICIAN ASSISTANT

## 2020-05-15 PROCEDURE — 63600175 PHARM REV CODE 636 W HCPCS: Mod: JG,HCNC | Performed by: PHYSICIAN ASSISTANT

## 2020-05-15 PROCEDURE — 97110 THERAPEUTIC EXERCISES: CPT | Mod: HCNC,CQ

## 2020-05-15 PROCEDURE — 25000003 PHARM REV CODE 250: Mod: HCNC | Performed by: STUDENT IN AN ORGANIZED HEALTH CARE EDUCATION/TRAINING PROGRAM

## 2020-05-15 PROCEDURE — 80048 BASIC METABOLIC PNL TOTAL CA: CPT | Mod: HCNC

## 2020-05-15 PROCEDURE — 76937 US GUIDE VASCULAR ACCESS: CPT | Mod: HCNC

## 2020-05-15 PROCEDURE — 36410 VNPNXR 3YR/> PHY/QHP DX/THER: CPT | Mod: HCNC

## 2020-05-15 PROCEDURE — 83735 ASSAY OF MAGNESIUM: CPT | Mod: HCNC

## 2020-05-15 PROCEDURE — 99231 PR SUBSEQUENT HOSPITAL CARE,LEVL I: ICD-10-PCS | Mod: HCNC,,, | Performed by: INTERNAL MEDICINE

## 2020-05-15 PROCEDURE — 63600175 PHARM REV CODE 636 W HCPCS: Mod: HCNC | Performed by: HOSPITALIST

## 2020-05-15 PROCEDURE — 85025 COMPLETE CBC W/AUTO DIFF WBC: CPT | Mod: HCNC

## 2020-05-15 PROCEDURE — 25000003 PHARM REV CODE 250: Mod: HCNC | Performed by: HOSPITALIST

## 2020-05-15 PROCEDURE — 36415 COLL VENOUS BLD VENIPUNCTURE: CPT | Mod: HCNC

## 2020-05-15 PROCEDURE — C1751 CATH, INF, PER/CENT/MIDLINE: HCPCS | Mod: HCNC

## 2020-05-15 RX ORDER — MORPHINE SULFATE 15 MG/1
15 TABLET, FILM COATED, EXTENDED RELEASE ORAL EVERY 12 HOURS
Status: DISCONTINUED | OUTPATIENT
Start: 2020-05-15 | End: 2020-05-16

## 2020-05-15 RX ORDER — ACETAMINOPHEN 500 MG
1000 TABLET ORAL EVERY 8 HOURS
Status: DISCONTINUED | OUTPATIENT
Start: 2020-05-15 | End: 2020-05-17 | Stop reason: HOSPADM

## 2020-05-15 RX ADMIN — Medication 6 MG: at 10:05

## 2020-05-15 RX ADMIN — ENOXAPARIN SODIUM 40 MG: 100 INJECTION SUBCUTANEOUS at 05:05

## 2020-05-15 RX ADMIN — PROCHLORPERAZINE EDISYLATE 5 MG: 5 INJECTION INTRAMUSCULAR; INTRAVENOUS at 10:05

## 2020-05-15 RX ADMIN — ACETAMINOPHEN 1000 MG: 500 TABLET ORAL at 10:05

## 2020-05-15 RX ADMIN — LEVOTHYROXINE SODIUM 125 MCG: 125 TABLET ORAL at 06:05

## 2020-05-15 RX ADMIN — ATORVASTATIN CALCIUM 80 MG: 20 TABLET, FILM COATED ORAL at 09:05

## 2020-05-15 RX ADMIN — MORPHINE SULFATE 15 MG: 15 TABLET, EXTENDED RELEASE ORAL at 10:05

## 2020-05-15 RX ADMIN — ASPIRIN 81 MG: 81 TABLET, DELAYED RELEASE ORAL at 09:05

## 2020-05-15 RX ADMIN — DAPTOMYCIN 620 MG: 350 INJECTION, POWDER, LYOPHILIZED, FOR SOLUTION INTRAVENOUS at 03:05

## 2020-05-15 RX ADMIN — QUETIAPINE FUMARATE 100 MG: 25 TABLET ORAL at 10:05

## 2020-05-15 RX ADMIN — FUROSEMIDE 80 MG: 40 TABLET ORAL at 09:05

## 2020-05-15 RX ADMIN — MORPHINE SULFATE 15 MG: 15 TABLET, EXTENDED RELEASE ORAL at 03:05

## 2020-05-15 RX ADMIN — HYDROCODONE BITARTRATE AND ACETAMINOPHEN 1 TABLET: 10; 325 TABLET ORAL at 10:05

## 2020-05-15 RX ADMIN — TRAZODONE HYDROCHLORIDE 300 MG: 100 TABLET ORAL at 10:05

## 2020-05-15 RX ADMIN — PANTOPRAZOLE SODIUM 40 MG: 40 TABLET, DELAYED RELEASE ORAL at 09:05

## 2020-05-15 RX ADMIN — OXYBUTYNIN CHLORIDE 15 MG: 15 TABLET, EXTENDED RELEASE ORAL at 09:05

## 2020-05-15 RX ADMIN — HYDROCODONE BITARTRATE AND ACETAMINOPHEN 1 TABLET: 10; 325 TABLET ORAL at 02:05

## 2020-05-15 RX ADMIN — ACETAMINOPHEN 1000 MG: 500 TABLET ORAL at 02:05

## 2020-05-15 RX ADMIN — FLUOXETINE 60 MG: 20 CAPSULE ORAL at 09:05

## 2020-05-15 RX ADMIN — ONDANSETRON 4 MG: 2 INJECTION INTRAMUSCULAR; INTRAVENOUS at 05:05

## 2020-05-15 NOTE — HOSPITAL COURSE
Admitted for symptomatic MRSA UTI in the setting of a suprapubic catheter. On daptomycin per ID recs. Workup for source of MRSA pending. 2D echo without any vegetations, blood cultures NGTD, CT A/P without any fluid collections/abscess.    05/15/20: Patient transferred to [facility] and seen via a virtual visit. No apparent cardiopulmonary distress. Complaints of pelvic pain and has chronic indwelling dilaudid pump. Pain medication adjusted to morphine 15 mg BID and PRN vicodin 1 tab Q4H continued.   05/16: Patient seen via a virtual visit. Continues to complaint of pelvic pain that is controlled with narcotics. However, in am, BP was low borderline with systolic in 80/90's, 500 mL bolus given no evidence of volume overload and normal cardiac function. Blood cultures repeated x 2, will obtain lactic acid and cortisol levels. Patient was also bradycardic in 49-51 bpm, however is verbal without any cardiopulmonary distress. EKG repeated consistent with sinus bradycardia, no ST-T wave changes. Patient also had a concern about medication cost upon discharge. Case management working on infusion company cost. Will discharge patient home with home infusion when medically stable.

## 2020-05-15 NOTE — PLAN OF CARE
CM received call from [employer] with [facility] with a quote of $1296.13 for the cost of the medications.

## 2020-05-15 NOTE — PT/OT/SLP PROGRESS
"Physical Therapy Treatment    Patient Name:  Tasha Hawley   MRN:  [id number]    Recommendations:     Discharge Recommendations:  home health PT   Discharge Equipment Recommendations: none   Barriers to discharge: None    Assessment:     Tasha Hawley is a 64 y.o. female admitted with a medical diagnosis of Urinary tract infection associated with cystostomy catheter.  She presents with the following impairments/functional limitations:  weakness, gait instability, impaired balance, impaired self care skills, decreased safety awareness.    Rehab Prognosis: Good; patient would benefit from acute skilled PT services to address these deficits and reach maximum level of function.    Recent Surgery: * No surgery found *      Plan:     During this hospitalization, patient to be seen 3 x/week to address the identified rehab impairments via gait training, therapeutic activities, therapeutic exercises and progress toward the following goals:    · Plan of Care Expires:  06/11/20    Subjective     Chief Complaint: not being taken care of at the hospital.   Patient/Family Comments/goals: "Why has no one been in here, I keep calling my nurse, Saud".   Pain/Comfort:  · Pain Rating 1: 0/10      Objective:     Communicated with nurse prior to session.  Patient found supine with   upon PT entry to room.     General Precautions: Standard, fall, contact   Orthopedic Precautions:N/A   Braces:       Functional Mobility:  · Bed Mobility:     · Supine to Sit: independence  · Transfers:     · Sit to Stand:  contact guard assistance with rolling walker  · Gait: 12 ft with RW and sba with unsafe tech as patient leans onto walker with forearms,      AM-PAC 6 CLICK MOBILITY  Turning over in bed (including adjusting bedclothes, sheets and blankets)?: 4  Sitting down on and standing up from a chair with arms (e.g., wheelchair, bedside commode, etc.): 3  Moving from lying on back to sitting on the side of the bed?: 4  Moving to and from a bed " to a chair (including a wheelchair)?: 3  Need to walk in hospital room?: 3  Climbing 3-5 steps with a railing?: 1  Basic Mobility Total Score: 18       Therapeutic Activities and Exercises:   white board updated  Performed sitting EOB LE therex 10 reps each including laq,ap,hip flex,hip abd/add.  Patient found in bed with her cath bag not clamped and urine flooded onto floor. Nurse notified and called while PTA in room and never came into room to check on patient and was un phased by PTA reporting that her MRSA urine was over floor.     Patient left seated EOB with all lines intact, call button in reach and nurse notified..    GOALS:   Multidisciplinary Problems     Physical Therapy Goals        Problem: Physical Therapy Goal    Goal Priority Disciplines Outcome Goal Variances Interventions   Physical Therapy Goal     PT, PT/OT Ongoing, Progressing     Description:  Goals to be met by: 20     Patient will increase functional independence with mobility by performin. Sit to stand transfer with Cable, from all surfaces.   2. Bed to chair transfer with Supervision using Rolling Walker.  3. Gait  x 75 feet with Supervision using Rolling Walker.   4. Ascend/Descend 6 inch curb step with Contact Guard Assistance using Rolling Walker.  5. Lower extremity exercise program x 20 reps per handout, with assistance as needed.                      Time Tracking:     PT Received On: 05/15/20  PT Start Time: 1414     PT Stop Time: 1445  PT Total Time (min): 31 min     Billable Minutes: Gait Training 15 and Therapeutic Exercise 16    Treatment Type: Treatment  PT/PTA: PTA     PTA Visit Number: Jasmyn     Deb Helena, PTA  05/15/2020

## 2020-05-15 NOTE — PLAN OF CARE
Referrals sent to [facility] and [facility].        05/15/20 0468   Post-Acute Status   Post-Acute Authorization Medications;Home Health   Home Health Status Awaiting Orders for HH   Medication Status Pending Access Placement     Jessica Allan RN, CM   Ext: [phone]

## 2020-05-15 NOTE — CONSULTS
Midline placed in right basilic, size [id number] Lot# [id number] Removal date on or before 6/13/20. Needle advanced into vessel with real time ultrasound guidance. Image recorded and saved.

## 2020-05-15 NOTE — PLAN OF CARE
Problem: Physical Therapy Goal  Goal: Physical Therapy Goal  Description  Goals to be met by: 20     Patient will increase functional independence with mobility by performin. Sit to stand transfer with [place], from all surfaces.   2. Bed to chair transfer with Supervision using Rolling Walker.  3. Gait  x 75 feet with Supervision using Rolling Walker.   4. Ascend/Descend 6 inch curb step with Contact Guard Assistance using Rolling Walker.  5. Lower extremity exercise program x 20 reps per handout, with assistance as needed.     Outcome: Ongoing, Progressing

## 2020-05-15 NOTE — PLAN OF CARE
Pt has been pleasant. She is alert and oriented x4. IV sites flush well with NS clean, dry and intact with out redness or swelling. She is able to turn and reposition self in bed. Suprapubic cath draining cloudy yellow urine to bsdb.  She cont to have a chronic c/o pain to back and pelvis. She stated that pelvic area has a lot of pressure. She has remained free of falls and injuries. Will cont to monitor. Bed is low and locked with call light with in easy reach.

## 2020-05-15 NOTE — SUBJECTIVE & OBJECTIVE
Interval History:    Review of Systems  Objective:     Vital Signs (Most Recent):  Temp: 97.9 °F (36.6 °C) (05/15/20 0415)  Pulse: (!) 46 (05/15/20 0700)  Resp: 14 (05/15/20 0700)  BP: 115/65 (05/15/20 0700)  SpO2: 97 % (05/15/20 0700) Vital Signs (24h Range):  Temp:  [96.5 °F (35.8 °C)-97.9 °F (36.6 °C)] 97.9 °F (36.6 °C)  Pulse:  [45-54] 46  Resp:  [14-34] 14  SpO2:  [96 %-98 %] 97 %  BP: (101-117)/(55-65) 115/65     Weight: 77.1 kg (170 lb)  Body mass index is 26.63 kg/m².    Intake/Output Summary (Last 24 hours) at 5/15/2020 1134  Last data filed at 5/15/2020 0100  Gross per 24 hour   Intake 600 ml   Output --   Net 600 ml      Physical Exam    Significant Labs:   CBC:   Recent Labs   Lab 05/14/20  0426 05/15/20  0408   WBC 4.57 4.49   HGB 10.8* 11.0*   HCT 35.2* 36.9*    170     CMP:   Recent Labs   Lab 05/14/20  0426 05/15/20  0408    141   K 4.3 4.0    103   CO2 28 29   GLU 91 89   BUN 5* 6*   CREATININE 0.8 0.9   CALCIUM 8.6* 8.9   ANIONGAP 6* 9   EGFRNONAA >60.0 >60.0       Significant Imaging: I have reviewed all pertinent imaging results/findings within the past 24 hours.

## 2020-05-15 NOTE — PLAN OF CARE
Pinky called CM with information that will be a barrier to discharge. Patient is unable to afford the co-pay for her infusion. Pinky explained the medication will cost $1295.13 for 7 days and the supplies will be $17.60 per day. It will be a total of $1850.19. CM asked why was the quote for 7 days when the patient will need a 14 day infusion. And it was brought up that the medication seemed quite expensive. CM is awaiting Pinky to call back with verification on this quote. CM leaders were made aware of the situation. Referrals to other companies were sent for quotes.      05/15/20 1640   Post-Acute Status   Post-Acute Authorization Placement   Post-Acute Placement Status Referrals Sent   Discharge Delays (!) Patient and Family Barriers   Jessica Allan RN,    Ext: [phone]

## 2020-05-15 NOTE — PROGRESS NOTES
[facility] Hospital Medicine  Telemedicine Progress Note    Patient Name: Tasha Hawley  MRN: [id number]  Patient Class: IP- Inpatient   Admission Date: 5/12/2020  Length of Stay: 3 days  Attending Physician: Rebecca Chery MD  Primary Care Provider: Mesfin Hodges Ii, MD    [facility] Medicine Team: [facility] [place]IRTUAL TEAM 10 Jennifer Callaway MD    Start time: 10 am   Chief complaint: fever  The patient location is: [facility]    Subjective:     Principal Problem:Urinary tract infection associated with cystostomy catheter    Overview/Hospital Course:  Admitted for symptomatic MRSA UTI in the setting of a suprapubic catheter. On daptomycin per ID recs. Workup for source of MRSA pending. 2D echo without any vegetations, blood cultures NGTD, CT A/P without any fluid collections/abscess.     Interval History (05/15/20): Patient seen via a virtual visit. No apparent cardiopulmonary distress. Complaints of pelvic pain and has chronic indwelling dilaudid pump. Pain medication adjusted to morphine 15 mg BID and PRN vicodin 1 tab Q4H continued.     Review of Systems  Constitutional: Negative for chills, fatigue, fever.   HENT: Negative for sore throat, trouble swallowing.    Eyes: Negative for photophobia, visual disturbance.   Respiratory: Negative for cough, shortness of breath.    Cardiovascular: Negative for , palpitations, leg swelling. Positive for chest pain  Gastrointestinal: Negative for abdominal pain, constipation, diarrhea, nausea, vomiting.   Endocrine: Negative for cold intolerance, heat intolerance.   Genitourinary: Negative for dysuria, frequency.   Musculoskeletal: +arthralgias, +myalgias.   Skin: Negative for rash, wound, erythema   Neurological: Negative for dizziness, syncope, weakness, light-headedness. +headache.  Psychiatric/Behavioral: Negative for confusion, hallucinations, anxiety    Objective:     Vital Signs (Most Recent):  Temp: 97.9 °F (36.6 °C) (05/15/20 0415)  Pulse: (!)  46 (05/15/20 0700)  Resp: 14 (05/15/20 0700)  BP: 115/65 (05/15/20 0700)  SpO2: 97 % (05/15/20 0700) Vital Signs (24h Range):  Temp:  [96.5 °F (35.8 °C)-97.9 °F (36.6 °C)] 97.9 °F (36.6 °C)  Pulse:  [45-54] 46  Resp:  [14-34] 14  SpO2:  [96 %-98 %] 97 %  BP: (101-117)/(55-65) 115/65     Weight: 77.1 kg (170 lb)  Body mass index is 26.63 kg/m².    Intake/Output Summary (Last 24 hours) at 5/15/2020 1134  Last data filed at 5/15/2020 0100  Gross per 24 hour   Intake 600 ml   Output --   Net 600 ml      Physical Exam  Limited as patient is in COVID unit.  Alert and Oriented X 4    Significant Labs:   CBC:   Recent Labs   Lab 05/14/20  0426 05/15/20  0408   WBC 4.57 4.49   HGB 10.8* 11.0*   HCT 35.2* 36.9*    170     CMP:   Recent Labs   Lab 05/14/20  0426 05/15/20  0408    141   K 4.3 4.0    103   CO2 28 29   GLU 91 89   BUN 5* 6*   CREATININE 0.8 0.9   CALCIUM 8.6* 8.9   ANIONGAP 6* 9   EGFRNONAA >60.0 >60.0     Significant Imaging: I have reviewed all pertinent imaging results/findings within the past 24 hours.    Assessment/Plan:      MRSA UTI associated with cystostomy catheter  Neurogenic Bladdder  -Suprapubic pressure and burning reported, urine cx from 5/8 showing MRSA.  -Pt. Afebrile, hemodynamically stable, WBC normal.  -MRSA infection complicated by patient's allergies to bactrim and vancomycin. Linezolid given in ED  --ID consulted and switched to dapto; 2D echo without any vegetations, blood cultures NGTD, CT A/P without any fluid collections/abscess  -Urology consulted in ED for management of suprapubic catheter (bloody drainage noted); no need for exchange at this time per uro  --Plan to continue daptomycin for 14 days; will order PICC tomorrow when blood cultures have remained negative for 48 hours   -- PICC line consult placed on 05/15/20     Lymphedema of both lower extremities  - Chronic issue that patient has had for years  - Continue home lasix     Primary hypothyroidism  -  Continue home levothyroxine     Coronary artery disease involving native coronary artery of native heart without angina pectoris  Pure hypercholesterolemia   - Continue ASA 81 mg and statin     GERD (gastroesophageal reflux disease)  -Continue home med 40 mg pantoprazole     Chronic pain syndrome  Narcotic dependency  - Patient currently has dilaudid pump in place and uses Norco  mg at home for breakthrough pain  - Morphine 15 mg BID added for pain relief as patient reports waking up every hour and is not getting relief from vicodin  - Will continue home meds  - Miralax, colace, and senna ordered for bowel regimen     Generalized anxiety disorder  - Continue home meds, seroquel and trazodone     Bradycardia  - EKG sinus bradycardia  - patient asymptomatic  - monitor on telemetry     Diet: reg  VTE PPX: lovenox    Goals of care: full   dispo: pending medical stability and pain control. Likely tomorrow. Home health per PT reccs    VTE Risk Mitigation (From admission, onward)         Ordered     enoxaparin injection 40 mg  Daily      05/12/20 2044     IP VTE HIGH RISK PATIENT  Once      05/12/20 2044              I have assessed these finding virtually using telemed platform and with assistance of bedside nurse     End time:  10:25 am    Total time spent with patient: 15 minutes    The attending portion of this evaluation, treatment, and documentation was performed per Jennifer Callaway MD via audiovisual.      Jennifer Callaway MD   Internal Medicine PGY 3  Department of [facility] Medicine   [facility]

## 2020-05-15 NOTE — PLAN OF CARE
ARTURO received call from Kinsey with [facility] regarding quote for cost of Daptomycin which would be $408.  [facility] would pay for the supplies.  Jackie ([phone]) is the on- for the weekend.

## 2020-05-16 PROBLEM — I95.9 HYPOTENSION: Status: ACTIVE | Noted: 2020-05-16

## 2020-05-16 LAB
ANION GAP SERPL CALC-SCNC: 8 MMOL/L (ref 8–16)
BASOPHILS # BLD AUTO: 0.02 K/UL (ref 0–0.2)
BASOPHILS NFR BLD: 0.4 % (ref 0–1.9)
BUN SERPL-MCNC: 8 MG/DL (ref 8–23)
CALCIUM SERPL-MCNC: 8.3 MG/DL (ref 8.7–10.5)
CHLORIDE SERPL-SCNC: 102 MMOL/L (ref 95–110)
CO2 SERPL-SCNC: 33 MMOL/L (ref 23–29)
CORTIS SERPL-MCNC: 1.3 UG/DL
CREAT SERPL-MCNC: 1 MG/DL (ref 0.5–1.4)
DIFFERENTIAL METHOD: ABNORMAL
EOSINOPHIL # BLD AUTO: 0.3 K/UL (ref 0–0.5)
EOSINOPHIL NFR BLD: 4.4 % (ref 0–8)
ERYTHROCYTE [DISTWIDTH] IN BLOOD BY AUTOMATED COUNT: 14 % (ref 11.5–14.5)
EST. GFR  (AFRICAN AMERICAN): >60 ML/MIN/1.73 M^2
EST. GFR  (NON AFRICAN AMERICAN): >60 ML/MIN/1.73 M^2
GLUCOSE SERPL-MCNC: 90 MG/DL (ref 70–110)
HCT VFR BLD AUTO: 33.6 % (ref 37–48.5)
HGB BLD-MCNC: 10.1 G/DL (ref 12–16)
IMM GRANULOCYTES # BLD AUTO: 0.02 K/UL (ref 0–0.04)
IMM GRANULOCYTES NFR BLD AUTO: 0.4 % (ref 0–0.5)
LACTATE SERPL-SCNC: 1.7 MMOL/L (ref 0.5–2.2)
LYMPHOCYTES # BLD AUTO: 1.7 K/UL (ref 1–4.8)
LYMPHOCYTES NFR BLD: 29.4 % (ref 18–48)
MAGNESIUM SERPL-MCNC: 1.9 MG/DL (ref 1.6–2.6)
MCH RBC QN AUTO: 27.8 PG (ref 27–31)
MCHC RBC AUTO-ENTMCNC: 30.1 G/DL (ref 32–36)
MCV RBC AUTO: 93 FL (ref 82–98)
MONOCYTES # BLD AUTO: 0.5 K/UL (ref 0.3–1)
MONOCYTES NFR BLD: 8.4 % (ref 4–15)
NEUTROPHILS # BLD AUTO: 3.2 K/UL (ref 1.8–7.7)
NEUTROPHILS NFR BLD: 57 % (ref 38–73)
NRBC BLD-RTO: 0 /100 WBC
PLATELET # BLD AUTO: 170 K/UL (ref 150–350)
PMV BLD AUTO: 11 FL (ref 9.2–12.9)
POTASSIUM SERPL-SCNC: 4.1 MMOL/L (ref 3.5–5.1)
RBC # BLD AUTO: 3.63 M/UL (ref 4–5.4)
SODIUM SERPL-SCNC: 143 MMOL/L (ref 136–145)
WBC # BLD AUTO: 5.62 K/UL (ref 3.9–12.7)

## 2020-05-16 PROCEDURE — 99232 PR SUBSEQUENT HOSPITAL CARE,LEVL II: ICD-10-PCS | Mod: HCNC,,, | Performed by: INTERNAL MEDICINE

## 2020-05-16 PROCEDURE — 85025 COMPLETE CBC W/AUTO DIFF WBC: CPT | Mod: HCNC

## 2020-05-16 PROCEDURE — 82533 TOTAL CORTISOL: CPT | Mod: HCNC

## 2020-05-16 PROCEDURE — 63600175 PHARM REV CODE 636 W HCPCS: Mod: HCNC | Performed by: HOSPITALIST

## 2020-05-16 PROCEDURE — 83605 ASSAY OF LACTIC ACID: CPT | Mod: HCNC

## 2020-05-16 PROCEDURE — 80048 BASIC METABOLIC PNL TOTAL CA: CPT | Mod: HCNC

## 2020-05-16 PROCEDURE — 93010 ELECTROCARDIOGRAM REPORT: CPT | Mod: HCNC,,, | Performed by: INTERNAL MEDICINE

## 2020-05-16 PROCEDURE — 93005 ELECTROCARDIOGRAM TRACING: CPT | Mod: HCNC

## 2020-05-16 PROCEDURE — 63600175 PHARM REV CODE 636 W HCPCS: Mod: JG,HCNC | Performed by: PHYSICIAN ASSISTANT

## 2020-05-16 PROCEDURE — 36415 COLL VENOUS BLD VENIPUNCTURE: CPT | Mod: HCNC

## 2020-05-16 PROCEDURE — 83735 ASSAY OF MAGNESIUM: CPT | Mod: HCNC

## 2020-05-16 PROCEDURE — 93010 EKG 12-LEAD: ICD-10-PCS | Mod: HCNC,,, | Performed by: INTERNAL MEDICINE

## 2020-05-16 PROCEDURE — 99232 SBSQ HOSP IP/OBS MODERATE 35: CPT | Mod: HCNC,,, | Performed by: INTERNAL MEDICINE

## 2020-05-16 PROCEDURE — 25000003 PHARM REV CODE 250: Mod: HCNC | Performed by: PHYSICIAN ASSISTANT

## 2020-05-16 PROCEDURE — 25000003 PHARM REV CODE 250: Mod: HCNC | Performed by: HOSPITALIST

## 2020-05-16 PROCEDURE — 25000003 PHARM REV CODE 250: Mod: HCNC | Performed by: STUDENT IN AN ORGANIZED HEALTH CARE EDUCATION/TRAINING PROGRAM

## 2020-05-16 PROCEDURE — 87040 BLOOD CULTURE FOR BACTERIA: CPT | Mod: 59,HCNC

## 2020-05-16 PROCEDURE — 11000001 HC ACUTE MED/SURG PRIVATE ROOM: Mod: HCNC

## 2020-05-16 RX ORDER — HYDROCODONE BITARTRATE AND ACETAMINOPHEN 10; 325 MG/1; MG/1
1 TABLET ORAL EVERY 6 HOURS PRN
Status: DISCONTINUED | OUTPATIENT
Start: 2020-05-16 | End: 2020-05-17 | Stop reason: HOSPADM

## 2020-05-16 RX ORDER — GABAPENTIN 100 MG/1
100 CAPSULE ORAL 3 TIMES DAILY
Status: DISCONTINUED | OUTPATIENT
Start: 2020-05-16 | End: 2020-05-17 | Stop reason: HOSPADM

## 2020-05-16 RX ADMIN — QUETIAPINE FUMARATE 100 MG: 25 TABLET ORAL at 09:05

## 2020-05-16 RX ADMIN — OXYBUTYNIN CHLORIDE 15 MG: 15 TABLET, EXTENDED RELEASE ORAL at 08:05

## 2020-05-16 RX ADMIN — SODIUM CHLORIDE 500 ML: 0.9 INJECTION, SOLUTION INTRAVENOUS at 09:05

## 2020-05-16 RX ADMIN — ACETAMINOPHEN 1000 MG: 500 TABLET ORAL at 01:05

## 2020-05-16 RX ADMIN — ATORVASTATIN CALCIUM 80 MG: 20 TABLET, FILM COATED ORAL at 08:05

## 2020-05-16 RX ADMIN — Medication 6 MG: at 09:05

## 2020-05-16 RX ADMIN — ONDANSETRON 4 MG: 2 INJECTION INTRAMUSCULAR; INTRAVENOUS at 04:05

## 2020-05-16 RX ADMIN — LEVOTHYROXINE SODIUM 125 MCG: 125 TABLET ORAL at 05:05

## 2020-05-16 RX ADMIN — GABAPENTIN 100 MG: 100 CAPSULE ORAL at 04:05

## 2020-05-16 RX ADMIN — HYDROCODONE BITARTRATE AND ACETAMINOPHEN 1 TABLET: 10; 325 TABLET ORAL at 04:05

## 2020-05-16 RX ADMIN — DAPTOMYCIN 620 MG: 350 INJECTION, POWDER, LYOPHILIZED, FOR SOLUTION INTRAVENOUS at 04:05

## 2020-05-16 RX ADMIN — TRAZODONE HYDROCHLORIDE 300 MG: 100 TABLET ORAL at 09:05

## 2020-05-16 RX ADMIN — PANTOPRAZOLE SODIUM 40 MG: 40 TABLET, DELAYED RELEASE ORAL at 08:05

## 2020-05-16 RX ADMIN — ENOXAPARIN SODIUM 40 MG: 100 INJECTION SUBCUTANEOUS at 05:05

## 2020-05-16 RX ADMIN — FLUOXETINE 60 MG: 20 CAPSULE ORAL at 08:05

## 2020-05-16 RX ADMIN — HYDROCODONE BITARTRATE AND ACETAMINOPHEN 1 TABLET: 10; 325 TABLET ORAL at 09:05

## 2020-05-16 RX ADMIN — ASPIRIN 81 MG: 81 TABLET, DELAYED RELEASE ORAL at 08:05

## 2020-05-16 RX ADMIN — GABAPENTIN 100 MG: 100 CAPSULE ORAL at 09:05

## 2020-05-16 NOTE — PROGRESS NOTES
05/16/20 0813   Vital Signs   Temp 97.3 °F (36.3 °C)   Temp src Oral   Temp #2 95 °F (35 °C)   Temp #2 src Skin   Pulse (!) 49   Heart Rate Source Continuous   Resp 16   SpO2 97 %   BP 97/65   MAP (mmHg) 79   BP Method cNIBP     Pt hypotensice. Asymptomatic, md notified. some 0900 Med held. See mar. Bolus adm. Will continue to monitor.

## 2020-05-16 NOTE — PLAN OF CARE
CM faxed referral to [facility] company, Senia is reviewing for cost, CM will continue to follow.     CM spoke with Senia at [facility], they are not in network so the price would be too expensive for the patient. CM called [facility] to speak with them about the pricing, the Price for the therapy until 5/25 is 408.22, CM to discuss with the patient.     CM spoke to patient and spouse about pricing, they are able to afford the 408.22 on a payment plan. CM called and updated Jackie, who will call and work out the details with the spouse, she will likely be able to discharge tomorrow once auth in complete, and teaching is done.       El Pillai  RN Case Manager   #[phone]

## 2020-05-16 NOTE — PLAN OF CARE
Pt AAOx4, VSS, afebrile. Suprapubic catheter in place with strict I&O maintained. Back pain and headache controlled with PRN medications with moderate relief. Zofran and Compazine given each x1 for nausea with moderate relief. No further changes overnight. WCTM.

## 2020-05-16 NOTE — PROGRESS NOTES
[facility] Hospital Medicine  Telemedicine Progress Note    Patient Name: Tasha Hawley  MRN: [id number]  Patient Class: IP- Inpatient   Admission Date: 5/12/2020  Length of Stay: 4 days  Attending Physician: Rebecca Chery MD  Primary Care Provider: Mesfin Hodges Ii, MD    [facility] Medicine Team: [facility] VIRTUAL TEAM 10 Jennifer Callaway MD    Start time: 9:30  Chief complaint: Pelvic Pain  The patient location is: [facility]  Present with the patient at the time of the telemed/virtual assessment: Tele Facilitator     Subjective:     Principal Problem: Urinary tract infection associated with cystostomy catheter     Overview/Hospital Course:  Admitted for symptomatic MRSA UTI in the setting of a suprapubic catheter. On daptomycin per ID recs. Workup for source of MRSA pending. 2D echo without any vegetations, blood cultures NGTD, CT A/P without any fluid collections/abscess.    05/15/20: Patient transferred to [facility] and seen via a virtual visit. No apparent cardiopulmonary distress. Complaints of pelvic pain and has chronic indwelling dilaudid pump. Pain medication adjusted to morphine 15 mg BID and PRN vicodin 1 tab Q4H continued.     Interval History (05/16/20): Patient seen via a virtual visit. Continues to complaint of pelvic pain that is controlled with narcotics. However, in am, BP was low borderline with systolic in 80/90's, 500 mL bolus given no evidence of volume overload and normal cardiac function. Blood cultures repeated x 2, will obtain lactic acid and cortisol levels. Patient was also bradycardic in 49-51 bpm, however is verbal without any cardiopulmonary distress. EKG repeated consistent with sinus bradycardia, no ST-T wave changes. Patient also had a concern about medication cost upon discharge. Case management working on infusion company cost. Will discharge patient home with home infusion when medically stable.      Review of Systems  Constitutional: Negative for  chills, fatigue, fever.   HENT: Negative for sore throat, trouble swallowing.    Eyes: Negative for photophobia, visual disturbance.   Respiratory: Negative for cough, shortness of breath.    Cardiovascular: Negative for , palpitations, leg swelling. Positive for chest pain  Gastrointestinal: Negative for abdominal pain, constipation, diarrhea, nausea, vomiting.   Endocrine: Negative for cold intolerance, heat intolerance.   Genitourinary: Negative for dysuria, frequency.   Musculoskeletal: +arthralgias, +myalgias.   Skin: Negative for rash, wound, erythema   Neurological: Negative for dizziness, syncope, weakness, light-headedness. +headache.  Psychiatric/Behavioral: Negative for confusion, hallucinations, anxiety    Objective:     Vital Signs (Most Recent):  Temp: 97.3 °F (36.3 °C) (05/16/20 0813)  Pulse: (!) 49 (05/16/20 0813)  Resp: 16 (05/16/20 0813)  BP: 97/65 (05/16/20 0813)  SpO2: 97 % (05/16/20 0813) Vital Signs (24h Range):  Temp:  [97.3 °F (36.3 °C)-98 °F (36.7 °C)] 97.3 °F (36.3 °C)  Pulse:  [45-51] 49  Resp:  [15-28] 16  SpO2:  [93 %-98 %] 97 %  BP: ()/(58-83) 97/65     Weight: 77.1 kg (170 lb)  Body mass index is 26.63 kg/m².    Intake/Output Summary (Last 24 hours) at 5/16/2020 0948  Last data filed at 5/16/2020 0422  Gross per 24 hour   Intake 325 ml   Output 675 ml   Net -350 ml      Physical Exam  Limited due to Tele Visit  Alert and Oriented x 4  NAD    Significant Labs:   CBC:   Recent Labs   Lab 05/15/20  0408 05/16/20  0331   WBC 4.49 5.62   HGB 11.0* 10.1*   HCT 36.9* 33.6*    170     CMP:   Recent Labs   Lab 05/15/20  0408 05/16/20  0331    143   K 4.0 4.1    102   CO2 29 33*   GLU 89 90   BUN 6* 8   CREATININE 0.9 1.0   CALCIUM 8.9 8.3*   ANIONGAP 9 8   EGFRNONAA >60.0 >60.0     Significant Imaging: I have reviewed all pertinent imaging results/findings within the past 24 hours.    Assessment/Plan:      MRSA UTI associated with cystostomy catheter  Neurogenic  Michaelder  -Suprapubic pressure and burning reported, urine cx from 5/8 showing MRSA.  -Pt. Afebrile, hemodynamically stable, WBC normal.  -MRSA infection complicated by patient's allergies to bactrim and vancomycin. Linezolid given in ED  --ID consulted and switched to dapto; 2D echo without any vegetations, blood cultures NGTD, CT A/P without any fluid collections/abscess  -Urology consulted in ED for management of suprapubic catheter (bloody drainage noted); no need for exchange at this time per uro  --Plan to continue daptomycin for 14 days; End date 05/25/20  -- Blood cultures x2 negative 05/13/20   -- Midline placed on 05/15/20     Hypotension  -- Continues to complaint of pelvic pain that is controlled with narcotics.   -- However, in am, BP was low borderline with systolic in 80/90's, 500 mL bolus given no evidence of volume overload and normal cardiac function.   Assessment: Differential diagnosis include: Narcotic Induced vs Hypovolemic vs sepsis related vs cardiogenic  Plan:  -- Blood cultures repeated x 2, will obtain lactic acid and cortisol levels.  --  Patient was also bradycardic in 49-51 bpm, however is verbal without any cardiopulmonary distress. EKG repeated consistent with sinus bradycardia, no ST-T wave changes.   -- Given 500 mL bolus and encourage PO intake   -- will hold lasix  -- will hold long acting morphine, Vicodin Q6H and patient has a Dilaudid pump from home for pain relief  -- Scheduled tylenol 1000 mg TID and gabapentin 100 mg TID (Home dose) for pain relief    Lymphedema of both lower extremities  - Chronic issue that patient has had for years  - Hold lasix given patient is hypotensive today      Primary hypothyroidism  - Continue home levothyroxine     Coronary artery disease involving native coronary artery of native heart without angina pectoris  Pure hypercholesterolemia   - Continue ASA 81 mg and statin     GERD (gastroesophageal reflux disease)  -Continue home med 40 mg  pantoprazole     Chronic pain syndrome  Narcotic dependency  - Patient currently has dilaudid pump in place and uses Norco  mg at home for breakthrough pain  - Will continue home meds  - Miralax, colace, and senna ordered for bowel regimen     Generalized anxiety disorder  - Continue home meds, seroquel and trazodone     Bradycardia  - EKG sinus bradycardia  - patient asymptomatic  - monitor on telemetry     Diet: reg  VTE PPX: lovenox    Goals of care: full   dispo: pending medical stability and pain control. Likely tomorrow. Home health with IV antibiotics       VTE Risk Mitigation (From admission, onward)         Ordered     enoxaparin injection 40 mg  Daily      05/12/20 2044     IP VTE HIGH RISK PATIENT  Once      05/12/20 2044              I have assessed these finding virtually using telemed platform and with assistance of bedside nurse     End time:  9:45 am    Total time spent with patient: 15 minutes    The attending portion of this evaluation, treatment, and documentation was performed per Jennifer Callaway MD via audiovisual.    Jennifer Callaway MD   Internal Medicine PGY 3  Department of Hospital Medicine   [facility]

## 2020-05-16 NOTE — SUBJECTIVE & OBJECTIVE
Interval History (05/16/20):     Review of Systems  Objective:     Vital Signs (Most Recent):  Temp: 97.3 °F (36.3 °C) (05/16/20 0813)  Pulse: (!) 49 (05/16/20 0813)  Resp: 16 (05/16/20 0813)  BP: 97/65 (05/16/20 0813)  SpO2: 97 % (05/16/20 0813) Vital Signs (24h Range):  Temp:  [97.3 °F (36.3 °C)-98 °F (36.7 °C)] 97.3 °F (36.3 °C)  Pulse:  [45-51] 49  Resp:  [15-28] 16  SpO2:  [93 %-98 %] 97 %  BP: ()/(58-83) 97/65     Weight: 77.1 kg (170 lb)  Body mass index is 26.63 kg/m².    Intake/Output Summary (Last 24 hours) at 5/16/2020 0948  Last data filed at 5/16/2020 0422  Gross per 24 hour   Intake 325 ml   Output 675 ml   Net -350 ml      Physical Exam    Significant Labs:   CBC:   Recent Labs   Lab 05/15/20  0408 05/16/20  0331   WBC 4.49 5.62   HGB 11.0* 10.1*   HCT 36.9* 33.6*    170     CMP:   Recent Labs   Lab 05/15/20  0408 05/16/20  0331    143   K 4.0 4.1    102   CO2 29 33*   GLU 89 90   BUN 6* 8   CREATININE 0.9 1.0   CALCIUM 8.9 8.3*   ANIONGAP 9 8   EGFRNONAA >60.0 >60.0       Significant Imaging: I have reviewed all pertinent imaging results/findings within the past 24 hours.

## 2020-05-17 VITALS
HEIGHT: 67 IN | BODY MASS INDEX: 26.68 KG/M2 | WEIGHT: 170 LBS | RESPIRATION RATE: 18 BRPM | HEART RATE: 48 BPM | SYSTOLIC BLOOD PRESSURE: 94 MMHG | OXYGEN SATURATION: 94 % | TEMPERATURE: 98 F | DIASTOLIC BLOOD PRESSURE: 61 MMHG

## 2020-05-17 LAB
ANION GAP SERPL CALC-SCNC: 6 MMOL/L (ref 8–16)
BACTERIA BLD CULT: NORMAL
BACTERIA BLD CULT: NORMAL
BASOPHILS # BLD AUTO: 0.03 K/UL (ref 0–0.2)
BASOPHILS NFR BLD: 0.7 % (ref 0–1.9)
BUN SERPL-MCNC: 8 MG/DL (ref 8–23)
CALCIUM SERPL-MCNC: 8.5 MG/DL (ref 8.7–10.5)
CHLORIDE SERPL-SCNC: 104 MMOL/L (ref 95–110)
CO2 SERPL-SCNC: 32 MMOL/L (ref 23–29)
CREAT SERPL-MCNC: 0.8 MG/DL (ref 0.5–1.4)
DIFFERENTIAL METHOD: ABNORMAL
EOSINOPHIL # BLD AUTO: 0.3 K/UL (ref 0–0.5)
EOSINOPHIL NFR BLD: 6.5 % (ref 0–8)
ERYTHROCYTE [DISTWIDTH] IN BLOOD BY AUTOMATED COUNT: 14 % (ref 11.5–14.5)
EST. GFR  (AFRICAN AMERICAN): >60 ML/MIN/1.73 M^2
EST. GFR  (NON AFRICAN AMERICAN): >60 ML/MIN/1.73 M^2
GLUCOSE SERPL-MCNC: 88 MG/DL (ref 70–110)
HCT VFR BLD AUTO: 34.4 % (ref 37–48.5)
HGB BLD-MCNC: 10.4 G/DL (ref 12–16)
IMM GRANULOCYTES # BLD AUTO: 0.01 K/UL (ref 0–0.04)
IMM GRANULOCYTES NFR BLD AUTO: 0.2 % (ref 0–0.5)
LYMPHOCYTES # BLD AUTO: 1.8 K/UL (ref 1–4.8)
LYMPHOCYTES NFR BLD: 39.6 % (ref 18–48)
MAGNESIUM SERPL-MCNC: 2.2 MG/DL (ref 1.6–2.6)
MCH RBC QN AUTO: 27.9 PG (ref 27–31)
MCHC RBC AUTO-ENTMCNC: 30.2 G/DL (ref 32–36)
MCV RBC AUTO: 92 FL (ref 82–98)
MONOCYTES # BLD AUTO: 0.5 K/UL (ref 0.3–1)
MONOCYTES NFR BLD: 10.3 % (ref 4–15)
NEUTROPHILS # BLD AUTO: 1.9 K/UL (ref 1.8–7.7)
NEUTROPHILS NFR BLD: 42.7 % (ref 38–73)
NRBC BLD-RTO: 0 /100 WBC
PLATELET # BLD AUTO: 164 K/UL (ref 150–350)
PMV BLD AUTO: 10.9 FL (ref 9.2–12.9)
POTASSIUM SERPL-SCNC: 4.2 MMOL/L (ref 3.5–5.1)
RBC # BLD AUTO: 3.73 M/UL (ref 4–5.4)
SODIUM SERPL-SCNC: 142 MMOL/L (ref 136–145)
T4 SERPL-MCNC: 5.2 UG/DL (ref 4.5–11.5)
TSH SERPL DL<=0.005 MIU/L-ACNC: 1.62 UIU/ML (ref 0.4–4)
WBC # BLD AUTO: 4.47 K/UL (ref 3.9–12.7)

## 2020-05-17 PROCEDURE — 84436 ASSAY OF TOTAL THYROXINE: CPT | Mod: HCNC

## 2020-05-17 PROCEDURE — 83735 ASSAY OF MAGNESIUM: CPT | Mod: HCNC

## 2020-05-17 PROCEDURE — 99238 PR HOSPITAL DISCHARGE DAY,<30 MIN: ICD-10-PCS | Mod: HCNC,,, | Performed by: INTERNAL MEDICINE

## 2020-05-17 PROCEDURE — 99238 HOSP IP/OBS DSCHRG MGMT 30/<: CPT | Mod: HCNC,,, | Performed by: INTERNAL MEDICINE

## 2020-05-17 PROCEDURE — 25000003 PHARM REV CODE 250: Mod: HCNC | Performed by: STUDENT IN AN ORGANIZED HEALTH CARE EDUCATION/TRAINING PROGRAM

## 2020-05-17 PROCEDURE — 80048 BASIC METABOLIC PNL TOTAL CA: CPT | Mod: HCNC

## 2020-05-17 PROCEDURE — 36415 COLL VENOUS BLD VENIPUNCTURE: CPT | Mod: HCNC

## 2020-05-17 PROCEDURE — 85025 COMPLETE CBC W/AUTO DIFF WBC: CPT | Mod: HCNC

## 2020-05-17 PROCEDURE — 84443 ASSAY THYROID STIM HORMONE: CPT | Mod: HCNC

## 2020-05-17 PROCEDURE — 63600175 PHARM REV CODE 636 W HCPCS: Mod: JG,HCNC | Performed by: STUDENT IN AN ORGANIZED HEALTH CARE EDUCATION/TRAINING PROGRAM

## 2020-05-17 PROCEDURE — 25000003 PHARM REV CODE 250: Mod: HCNC | Performed by: HOSPITALIST

## 2020-05-17 RX ADMIN — ATORVASTATIN CALCIUM 80 MG: 20 TABLET, FILM COATED ORAL at 09:05

## 2020-05-17 RX ADMIN — FLUOXETINE 60 MG: 20 CAPSULE ORAL at 09:05

## 2020-05-17 RX ADMIN — LEVOTHYROXINE SODIUM 125 MCG: 125 TABLET ORAL at 05:05

## 2020-05-17 RX ADMIN — ACETAMINOPHEN 1000 MG: 500 TABLET ORAL at 05:05

## 2020-05-17 RX ADMIN — HYDROCODONE BITARTRATE AND ACETAMINOPHEN 1 TABLET: 10; 325 TABLET ORAL at 09:05

## 2020-05-17 RX ADMIN — ASPIRIN 81 MG: 81 TABLET, DELAYED RELEASE ORAL at 09:05

## 2020-05-17 RX ADMIN — GABAPENTIN 100 MG: 100 CAPSULE ORAL at 09:05

## 2020-05-17 RX ADMIN — OXYBUTYNIN CHLORIDE 15 MG: 15 TABLET, EXTENDED RELEASE ORAL at 09:05

## 2020-05-17 RX ADMIN — PANTOPRAZOLE SODIUM 40 MG: 40 TABLET, DELAYED RELEASE ORAL at 09:05

## 2020-05-17 RX ADMIN — DAPTOMYCIN 620 MG: 350 INJECTION, POWDER, LYOPHILIZED, FOR SOLUTION INTRAVENOUS at 12:05

## 2020-05-17 NOTE — PLAN OF CARE
Pt alert. V/s stable. Discharge planning presented to pt. Understanding verbalized. Iv and catheter left in per md order. Pt transported off unit via transport with belongings. Stated  would drive her home. No complaints at this time.

## 2020-05-17 NOTE — PLAN OF CARE
[facility]  HOME HEALTH ORDERS  FACE TO FACE ENCOUNTER    Patient Name: Tasha Hawley  YOB: 1955    PCP: Mesfin Hodges Ii, MD   PCP Address: [address] [facility] [address]  PCP Phone Number: [phone]  PCP Fax: [phone]    Encounter Date: 05/17/2020    Admit to Home Health    Diagnoses:  Active Hospital Problems    Diagnosis  POA    *Urinary tract infection associated with cystostomy catheter [T83.510A, N39.0]  Yes    Hypotension [I95.9]  No    Bradycardia, sinus [R00.1]  Yes    Pure hypercholesterolemia [E78.00]  Yes    Lymphedema of both lower extremities [I89.0]  Yes     Chronic    Primary hypothyroidism [E03.9]  Yes     Chronic    Neurogenic bladder [N31.9]  Yes     Chronic    Coronary artery disease involving native coronary artery of native heart without angina pectoris [I25.10]  Yes     Chronic    GERD (gastroesophageal reflux disease) [K21.9]  Yes     Chronic    Narcotic dependency, continuous [F11.20]  Yes     Chronic    Chronic pain syndrome [G89.4]  Yes     Chronic    Generalized anxiety disorder [F41.1]  Yes      Resolved Hospital Problems   No resolved problems to display.       Future Appointments   Date Time Provider Department Center   5/18/2020  1:00 PM APPOINTMENT [facility]   10/19/2020  3:00 PM APPOINTMENT [facility]     Follow-up Information     Mesfin Hodges Ii, MD In 1 week.    Specialty:  Internal Medicine  Contact information:  [address]  [place] [address]  [phone]             Schedule an appointment as soon as possible for a visit with Thierry Sanchez - Infectious Diseases.    Specialty:  Infectious Diseases  Contact information:  [facility] [facility] [address] [phone]  Additional information:  1st Floor - [address]               Weekly outpatient laboratory on Monday or Tuesday while on IV antibiotics.   · CBC  · CMP   · CPK      Fax laboratory results to [facility] at [phone] with attn: CARMEN    Patient will also have urology outpatient follow up on discharge    I have seen and examined this patient face to face today. My clinical findings that support the need for the home health skilled services and home bound status are the following:  Weakness/numbness causing balance and gait disturbance due to Weakness/Debility making it taxing to leave home.    Allergies:  Review of patient's allergies indicates:   Allergen Reactions    (d)-limonene flavor      Other reaction(s): difficult intubation  Other reaction(s): Difficulty breathing    Bactrim [sulfamethoxazole-trimethoprim] Anaphylaxis    Benadryl [diphenhydramine hcl] Shortness Of Breath    Imitrex [sumatriptan succinate] Shortness Of Breath    Topamax [topiramate] Shortness Of Breath    Vancomycin Shortness Of Breath     Rash    Butorphanol tartrate     Darvocet a500 [propoxyphene n-acetaminophen]      Other reaction(s): Difficulty breathing    Fentanyl      Other reaction(s): Vomiting  Other reaction(s): Nausea  Other reaction(s): Itching  swelling    White petrolatum-zinc     Zinc oxide-white petrolatum      Other reaction(s): Difficulty breathing    Latex, natural rubber Itching and Rash    Phenytoin Rash and Other (See Comments)     Trouble breathing       Diet: regular diet    Activities: activity as tolerated    Nursing:   SN to complete comprehensive assessment including routine vital signs. Instruct on disease process and s/s of complications to report to MD. Review/verify medication list sent home with the patient at time of discharge  and instruct patient/caregiver as needed. Frequency may be adjusted depending on start of care date.    Notify MD if SBP > 160 or < 90; DBP > 90 or < 50; HR > 120 or < 50; Temp > 101;    CONSULTS:    Physical Therapy to evaluate and treat. Evaluate for home safety and equipment needs; Establish/upgrade home exercise program.  Perform / instruct on therapeutic exercises, gait training, transfer training, and Range of Motion.  Occupational Therapy to evaluate and treat. Evaluate home environment for safety and equipment needs. Perform/Instruct on transfers, ADL training, ROM, and therapeutic exercises.    MISCELLANEOUS CARE:  Home Infusion Therapy:   SN to perform Infusion Therapy/ Midline Care.  Review Central Line Care & Central Line Flush with patient.     Mid Line Care  Scrub the Hub: Prior to accessing the line, always perform a 30 second alcohol scrub  Each lumen of the central line is to be flushed at least daily with 10 mL Normal Saline and 3 mL Heparin flush (10 units/mL)  Skilled Nurse (SN) may draw blood from IV access  Blood Draw Procedure:              - Aspirate at least 5 mL of blood              - Discard              - Obtain specimen              - Change injection cap              - Flush with 20 mL Normal Saline followed by a                 3-5 mL Heparin flush (10 units/mL)  Line Care /  :              - Sterile dressing changes are done weekly and as needed.              - Use chlor-hexadine scrub to cleanse site, apply Biopatch to insertion site,                 apply securement device dressing              - Injection caps are changed weekly and after EVERY lab draw.              - If sterile gauze is under dressing to control oozing,                 dressing change must be performed every 24 hours until gauze is not needed.    Line removal upon completion of antibiotic therapy 05/25/20    WOUND CARE ORDERS  n/a    Medications: Review discharge medications with patient and family and provide education.      Current Discharge Medication List      START taking these medications    Details   sodium chloride 0.9% SolP 50 mL with DAPTOmycin 350 mg SolR 620 mg Inject 620 mg into the vein once daily. for 8 days  Qty: 4960 mg, Refills: 0 End Date 05/25/20         CONTINUE these medications which have NOT  CHANGED    Details   ascorbic acid, vitamin C, (VITAMIN C) 500 MG tablet Take 500 mg by mouth once daily.      aspirin (ECOTRIN) 81 MG EC tablet Take 1 tablet (81 mg total) by mouth once daily.  Refills: 0      atorvastatin (LIPITOR) 80 MG tablet TAKE 1 TABLET BY MOUTH DAILY  Qty: 90 tablet, Refills: 3    Associated Diagnoses: Mixed hyperlipidemia      butalbital-acetaminophen-caffeine -40 mg (FIORICET, ESGIC) -40 mg per tablet Take 1 tablet by mouth every 4 (four) hours as needed for Headaches.       cholecalciferol, vitamin D3, (VITAMIN D3) 50 mcg (2,000 unit) Cap Take 1 capsule (2,000 Units total) by mouth once daily.  Qty: 90 capsule, Refills: 3    Associated Diagnoses: Vitamin D insufficiency; Low blood potassium      ferrous sulfate (FEOSOL) 325 mg (65 mg iron) Tab tablet Take 1 tablet (325 mg total) by mouth once daily.  Qty: 90 tablet, Refills: 1    Associated Diagnoses: Vitamin D insufficiency; Low blood potassium      FLUoxetine 20 MG capsule Take 3 capsules (60 mg total) by mouth once daily.  Qty: 270 capsule, Refills: 1    Associated Diagnoses: Anxiety      gabapentin (NEURONTIN) 100 MG capsule Take 1 capsule (100 mg total) by mouth 3 (three) times daily.  Qty: 90 capsule, Refills: 2      HYDROcodone-acetaminophen (NORCO)  mg per tablet Take 1 tablet by mouth every 6 (six) hours as needed for Pain.  Qty: 15 tablet, Refills: 0      intrathecal pain pump compound Hydromorphone (7.5 mg/mL) infusion at 3.6799 mg/day (0.1533 mg/hr) out of a total reservoir volume of 37.3 mL      levothyroxine (SYNTHROID) 125 MCG tablet Take 1 tablet (125 mcg total) by mouth before breakfast.  Qty: 90 tablet, Refills: 3    Associated Diagnoses: Primary hypothyroidism      oxybutynin (DITROPAN XL) 15 MG TR24 Take 15 mg by mouth once daily.      oxyCODONE-acetaminophen (PERCOCET)  mg per tablet     Comments: Quantity prescribed more than 7 day supply? Press F2 and select one:[id number]        pantoprazole  (PROTONIX) 40 MG tablet Take 1 tablet (40 mg total) by mouth once daily.  Qty: 90 tablet, Refills: 3    Associated Diagnoses: Esophageal dysphagia      polyethylene glycol (GLYCOLAX) 17 gram PwPk Take 17 g by mouth once daily.  Qty: 30 each, Refills: 2      promethazine (PHENERGAN) 25 MG tablet Take 25 mg by mouth every 6 (six) hours as needed for Nausea.      QUEtiapine (SEROQUEL) 100 MG Tab TAKE 1 TABLET (100 MG TOTAL) BY MOUTH EVERY EVENING.  Qty: 90 tablet, Refills: 1      senna (SENNA LAX) 8.6 mg tablet Take 2 tablets by mouth 2 (two) times daily.      tiZANidine (ZANAFLEX) 4 MG tablet       traZODone (DESYREL) 100 MG tablet Take 3 tablets (300 mg total) by mouth every evening.  Qty: 270 tablet, Refills: 1    Associated Diagnoses: Insomnia, unspecified type         STOP taking these medications       amoxicillin-clavulanate 500-125mg (AUGMENTIN) 500-125 mg Tab Comments:   Reason for Stopping:         ergocalciferol (ERGOCALCIFEROL) 50,000 unit Cap Comments:   Reason for Stopping:         fluconazole (DIFLUCAN) 100 MG tablet Comments:   Reason for Stopping:         furosemide (LASIX) 40 MG tablet Comments:   Reason for Stopping:         lamotrigine (LAMICTAL) 25 MG tablet Comments:   Reason for Stopping:         lidocaine (LIDODERM) 5 % Comments:   Reason for Stopping:         potassium chloride SA (K-DUR,KLOR-CON) 10 MEQ tablet Comments:   Reason for Stopping:         potassium citrate (UROCIT-K) 10 mEq (1,080 mg) TbSR Comments:   Reason for Stopping:         torsemide (DEMADEX) 10 MG Tab Comments:   Reason for Stopping:             I certify that this patient is confined to her home and needs intermittent skilled nursing care, physical therapy and occupational therapy.

## 2020-05-17 NOTE — SUBJECTIVE & OBJECTIVE
Interval History (05/17/20):    Review of Systems  Objective:     Vital Signs (Most Recent):  Temp: 97.9 °F (36.6 °C) (05/17/20 0500)  Pulse: (!) 57 (05/17/20 0912)  Resp: 12 (05/17/20 0912)  BP: 104/67 (05/17/20 0912)  SpO2: (!) 94 % (05/17/20 0912) Vital Signs (24h Range):  Temp:  [96.1 °F (35.6 °C)-98.2 °F (36.8 °C)] 97.9 °F (36.6 °C)  Pulse:  [44-59] 57  Resp:  [12-27] 12  SpO2:  [93 %-96 %] 94 %  BP: ()/(47-70) 104/67     Weight: 77.1 kg (170 lb)  Body mass index is 26.63 kg/m².    Intake/Output Summary (Last 24 hours) at 5/17/2020 0938  Last data filed at 5/17/2020 0700  Gross per 24 hour   Intake 450 ml   Output 1900 ml   Net -1450 ml      Physical Exam    Significant Labs:   CBC:   Recent Labs   Lab 05/16/20  0331 05/17/20  0417   WBC 5.62 4.47   HGB 10.1* 10.4*   HCT 33.6* 34.4*    164     CMP:   Recent Labs   Lab 05/16/20  0331 05/17/20  0417    142   K 4.1 4.2    104   CO2 33* 32*   GLU 90 88   BUN 8 8   CREATININE 1.0 0.8   CALCIUM 8.3* 8.5*   ANIONGAP 8 6*   EGFRNONAA >60.0 >60.0       Significant Imaging: I have reviewed all pertinent imaging results/findings within the past 24 hours.

## 2020-05-17 NOTE — PLAN OF CARE
Pt remain free from fall and injuries. Pain is medicated with PRN pain meds. Safety maintained. Suprapubic cat intact. VSS. Safety maintained. Will monitor.

## 2020-05-17 NOTE — DISCHARGE SUMMARY
[facility] Hospital Medicine  Discharge Summary      Patient Name: Tasha Hawley  MRN: [id number]  Admission Date: 5/12/2020  Hospital Length of Stay: 5 days  Discharge Date and Time:  05/17/2020 10:32 AM  Attending Physician: Rebecca Chery MD   Discharging Provider: Jennifer Callaway MD  Primary Care Provider: Mesfin Hodges Ii, MD  [facility] Medicine Team: [facility] 10 Jennifer Callaway MD    HPI and Hospital Course:     Admitted for symptomatic MRSA UTI in the setting of a suprapubic catheter. On daptomycin per ID recs. Workup for source of MRSA pending. 2D echo without any vegetations, blood cultures NGTD, CT A/P without any fluid collections/abscess.    05/15/20: Patient transferred to [facility] and seen via a virtual visit. No apparent cardiopulmonary distress. Complaints of pelvic pain and has chronic indwelling dilaudid pump. Pain medication adjusted to morphine 15 mg BID and PRN vicodin 1 tab Q4H continued.   05/16: Patient seen via a virtual visit. Continues to complaint of pelvic pain that is controlled with narcotics. However, in am, BP was low borderline with systolic in 80/90's, 500 mL bolus given no evidence of volume overload and normal cardiac function. Blood cultures repeated x 2, will obtain lactic acid and cortisol levels. Patient was also bradycardic in 49-51 bpm, however is verbal without any cardiopulmonary distress. EKG repeated consistent with sinus bradycardia, no ST-T wave changes. Patient also had a concern about medication cost upon discharge. Case management working on infusion company cost. Will discharge patient home with home infusion when medically stable.      Interval History (05/17/20): Patient virtual visit conducted today. Patient doing well, still complaints of some pelvic pressure with improvement compared to the day of arrival. Is ambulatory. Tolerating regular diet. Home Infusion IV abx set up complete per case management and to be continued till  05/25/20. Follow up weekly labs at ID clinic. Follow up urology and PCP outpatient. Assessed to be stable to discharge home today. Normotensive. Hold diuretics until follow up with PCP. Patient informed of medication changes.     Consults:   Consults (From admission, onward)        Status Ordering Provider     Inpatient consult to Infectious Diseases  Once     Provider:  (Not yet assigned)    Completed VASILIY CUTLER     [facility]  Once     Provider:  (Not yet assigned)    Completed KOLBY KRUGER     Inpatient consult to Urology  Once     Provider:  (Not yet assigned)    Completed VASILIY CUTLER     Inpatient virtual consult to Hospital Medicine  Once     Provider:  (Not yet assigned)    Completed DELIA BLACK        Final Active Diagnoses:    Diagnosis Date Noted POA    PRINCIPAL PROBLEM:  Urinary tract infection associated with cystostomy catheter [T83.510A, N39.0] 05/12/2020 Yes    Hypotension [I95.9] 05/16/2020 No    Bradycardia, sinus [R00.1] 01/27/2020 Yes    Pure hypercholesterolemia [E78.00] 01/13/2020 Yes    Lymphedema of both lower extremities [I89.0] 03/02/2017 Yes     Chronic    Primary hypothyroidism [E03.9] 02/02/2017 Yes     Chronic    Neurogenic bladder [N31.9] 09/27/2016 Yes     Chronic    Coronary artery disease involving native coronary artery of native heart without angina pectoris [I25.10] 08/16/2016 Yes     Chronic    GERD (gastroesophageal reflux disease) [K21.9] 08/16/2016 Yes     Chronic    Narcotic dependency, continuous [F11.20] 07/18/2014 Yes     Chronic    Chronic pain syndrome [G89.4] 05/01/2013 Yes     Chronic    Generalized anxiety disorder [F41.1]  Yes      Problems Resolved During this Admission:     Discharged Condition: fair    Disposition: Home or Self Care    Follow Up:  Follow-up Information     Mesfin Hodges Ii, MD In 1 week.    Specialty:  Internal Medicine  Contact information:  [address]  [place]  [address]  [phone]             Schedule an appointment as soon as possible for a visit with Thierry Zayas - Infectious Diseases.    Specialty:  Infectious Diseases  Contact information:  [address]  [facility] [address] [phone]  Additional information:  1st Floor - Atrium           Schedule an appointment as soon as possible for a visit with Thierry Sanchezviviana - Urology 4th Floor.    Specialty:  Urology  Contact information:  [address]  [facility] [address] [phone]  Additional information:  Atrium - 4th Floor               Patient Instructions:      Ambulatory referral/consult to Infectious Disease   Standing Status: Future   Referral Priority: Routine Referral Type: Consultation   Referral Reason: Specialty Services Required   Requested Specialty: Infectious Diseases   Number of Visits Requested: 1     Ambulatory referral/consult to Urology   Standing Status: Future   Referral Priority: Routine Referral Type: Consultation   Referral Reason: Specialty Services Required   Requested Specialty: Urology   Number of Visits Requested: 1     Diet Adult Regular     Notify your health care provider if you experience any of the following:  temperature >100.4     Notify your health care provider if you experience any of the following:  persistent nausea and vomiting or diarrhea     Notify your health care provider if you experience any of the following:  difficulty breathing or increased cough     Notify your health care provider if you experience any of the following:  persistent dizziness, light-headedness, or visual disturbances     Activity as tolerated       Significant Diagnostic Studies: Labs:   BMP:   Recent Labs   Lab 05/16/20  0331 05/17/20 0417   GLU 90 88    142   K 4.1 4.2    104   CO2 33* 32*   BUN 8 8   CREATININE 1.0 0.8   CALCIUM 8.3* 8.5*   MG 1.9 2.2   , CMP   Recent Labs   Lab 05/16/20  0331 05/17/20 0417    142   K 4.1 4.2    104   CO2 33*  32*   GLU 90 88   BUN 8 8   CREATININE 1.0 0.8   CALCIUM 8.3* 8.5*   ANIONGAP 8 6*   ESTGFRAFRICA >60.0 >60.0   EGFRNONAA >60.0 >60.0   , CBC   Recent Labs   Lab 05/16/20  0331 05/17/20  0417   WBC 5.62 4.47   HGB 10.1* 10.4*   HCT 33.6* 34.4*    164   , INR   Lab Results   Component Value Date    INR 0.9 05/27/2014    INR 1.0 05/01/2013    INR 0.9 06/23/2012   , Lipid Panel   Lab Results   Component Value Date    CHOL 122 02/21/2019    HDL 47 02/21/2019    LDLCALC 63.8 02/21/2019    TRIG 56 02/21/2019    CHOLHDL 38.5 02/21/2019   , Troponin   Recent Labs   Lab 05/13/20  1216   TROPONINI 0.015   , A1C: No results for input(s): HGBA1C in the last 4320 hours. and All labs within the past 24 hours have been reviewed  Microbiology:   Blood Culture   Lab Results   Component Value Date    LABBLOO No Growth to date 05/16/2020    LABBLOO No Growth to date 05/16/2020    and Urine Culture    Lab Results   Component Value Date    LABURIN  05/12/2020     Multiple organisms isolated. None in predominance.  Repeat if    LABURIN clinically necessary. 05/12/2020     Cardiac Graphics: Echocardiogram:   2D echo with color flow doppler:   Results for orders placed or performed during the hospital encounter of 08/20/17   2D echo with color flow doppler   Result Value Ref Range    QEF 65 55 - 65    Diastolic Dysfunction Yes (A)     Pericardial Effusion NONE     Narrative    Date of Procedure: 08/21/2017        TEST DESCRIPTION   Technical Quality: This is a technically good study.     Aorta: The aortic root is normal in size, measuring 3.2 cm at sinotubular junction.     Left Atrium: The left atrial volume index is severely enlarged, measuring 58.77 cc/m2.     Left Ventricle: The left ventricle is normal in size, with an end-diastolic diameter of 4.8 cm, and an end-systolic diameter of 3.0 cm. LV wall thickness is normal, with the septum and the posterior wall each measuring 1.0 cm across. Relative wall   thickness was normal at  0.42, and the LV mass index was increased at 118.1 g/m2 consistent with eccentric left ventricular hypertrophy. There are no regional wall motion abnormalities. Left ventricular systolic function appears normal. Visually estimated   ejection fraction is 60-65%. The LV Doppler derived stroke volume equals 99.0 ccs.     Diastolic indices: E wave velocity 1.0 m/s, E/A ratio 1.1,  msec., E/e' ratio(lat) 10. There is diastolic dysfunction secondary to relaxation abnormality.     Right Atrium: The right atrium is normal in size, measuring 4.7 cm in length in the apical view.     Right Ventricle: The right ventricle is normal in size measuring 2.9 cm at the base in the apical right ventricle-focused view. Global right ventricular systolic function appears normal.     Aortic Valve:  The aortic valve is normal in structure. The aortic valve is tri-leaflet in structure.     Mitral Valve:  The mitral valve is normal in structure.     Tricuspid Valve:  The tricuspid valve is normal in structure.     Pulmonary Valve:  The pulmonic valve is normal in structure.     Pericardium: There is no evidence of pericardial effusion.      IVC: IVC is normal in size and collapses > 50% with a sniff, suggesting normal right atrial pressure of 3 mmHg.     Intracavitary: There is no evidence of intracavity mass, thrombi, or vegetation.         CONCLUSIONS     1 - Normal left ventricular systolic function (EF 60-65%).     2 - Impaired LV relaxation, normal LAP (grade 1 diastolic dysfunction).     3 - Normal right ventricular systolic function .     4 - Eccentric hypertrophy.     5 - Severe left atrial enlargement.             This document has been electronically    SIGNED BY: Tyler Stubbs MD On: 08/21/2017 16:12    and Transthoracic echo (TTE) complete (Cupid Only):   Results for orders placed or performed during the hospital encounter of 05/12/20   Echo Color Flow Doppler? Yes   Result Value Ref Range    BSA 1.91 m2    TDI SEPTAL  0.06 m/s    LV LATERAL E/E' RATIO 10.55 m/s    LV SEPTAL E/E' RATIO 19.33 m/s    LA WIDTH 4.18 cm    TDI LATERAL 0.11 m/s    LVIDD 4.10 3.5 - 6.0 cm    IVS 0.67 0.6 - 1.1 cm    PW 0.79 0.6 - 1.1 cm    LVIDS 2.86 2.1 - 4.0 cm    FS 30 28 - 44 %    LA volume 82.57 cm3    Sinus 2.70 cm    STJ 2.93 cm    Ascending aorta 2.86 cm    LV mass 86.26 g    LA size 4.13 cm    RVDD 3.81 cm    TAPSE 2.36 cm    Left Ventricle Relative Wall Thickness 0.39 cm    AV mean gradient 4 mmHg    AV valve area 2.11 cm2    AV Velocity Ratio 0.57     AV index (prosthetic) 0.65     E/A ratio 1.61     Mean e' 0.09 m/s    E wave decelartion time 249.96 msec    LVOT diameter 2.03 cm    LVOT area 3.2 cm2    LVOT peak jorge 0.78 m/s    LVOT peak VTI 22.96 cm    Ao peak jorge 1.38 m/s    Ao VTI 35.23 cm    LVOT stroke volume 74.27 cm3    AV peak gradient 8 mmHg    E/E' ratio 13.65 m/s    MV Peak E Jorge 1.16 m/s    TR Max Jorge 2.21 m/s    MV Peak A Jorge 0.72 m/s    LV Systolic Volume 31.01 mL    LV Systolic Volume Index 16.4 mL/m2    LV Diastolic Volume 74.22 mL    LV Diastolic Volume Index 39.33 mL/m2    LA Volume Index 43.8 mL/m2    LV Mass Index 46 g/m2    RA Major Axis 5.12 cm    Left Atrium Minor Axis 5.50 cm    Left Atrium Major Axis 5.76 cm    Triscuspid Valve Regurgitation Peak Gradient 20 mmHg    RA Width 4.06 cm    Right Atrial Pressure (from IVC) 3 mmHg    TV rest pulmonary artery pressure 23 mmHg    Narrative    · Normal left ventricular systolic function. The estimated ejection   fraction is 55%.  · Indeterminate left ventricular diastolic function.  · No wall motion abnormalities.  · Normal right ventricular systolic function.  · Biatrial enlargement.  · Mild tricuspid regurgitation.  · The estimated PA systolic pressure is 23 mmHg.  · Normal central venous pressure (3 mmHg).          Pending Diagnostic Studies:     None         Medications:  Reconciled Home Medications:      Medication List      START taking these medications    sodium  chloride 0.9% SolP 50 mL with DAPTOmycin 350 mg SolR 620 mg  Inject 620 mg into the vein once daily. for 8 days        CHANGE how you take these medications    QUEtiapine 100 MG Tab  Commonly known as:  SEROQUEL  TAKE 1 TABLET (100 MG TOTAL) BY MOUTH EVERY EVENING.  What changed:  Another medication with the same name was removed. Continue taking this medication, and follow the directions you see here.        CONTINUE taking these medications    aspirin 81 MG EC tablet  Commonly known as:  ECOTRIN  Take 1 tablet (81 mg total) by mouth once daily.     atorvastatin 80 MG tablet  Commonly known as:  LIPITOR  TAKE 1 TABLET BY MOUTH DAILY     butalbital-acetaminophen-caffeine -40 mg -40 mg per tablet  Commonly known as:  FIORICET, ESGIC  Take 1 tablet by mouth every 4 (four) hours as needed for Headaches.     cholecalciferol (vitamin D3) 50 mcg (2,000 unit) Cap  Commonly known as:  VITAMIN D3  Take 1 capsule (2,000 Units total) by mouth once daily.     ferrous sulfate 325 mg (65 mg iron) Tab tablet  Commonly known as:  FEOSOL  Take 1 tablet (325 mg total) by mouth once daily.     FLUoxetine 20 MG capsule  Take 3 capsules (60 mg total) by mouth once daily.     gabapentin 100 MG capsule  Commonly known as:  NEURONTIN  Take 1 capsule (100 mg total) by mouth 3 (three) times daily.     HYDROcodone-acetaminophen  mg per tablet  Commonly known as:  NORCO  Take 1 tablet by mouth every 6 (six) hours as needed for Pain.     INTRATHECAL PAIN PUMP COMPOUND  Hydromorphone (7.5 mg/mL) infusion at 3.6799 mg/day (0.1533 mg/hr) out of a total reservoir volume of 37.3 mL     levothyroxine 125 MCG tablet  Commonly known as:  SYNTHROID  Take 1 tablet (125 mcg total) by mouth before breakfast.     oxybutynin 15 MG Tr24  Commonly known as:  DITROPAN XL  Take 15 mg by mouth once daily.     oxyCODONE-acetaminophen  mg per tablet  Commonly known as:  PERCOCET     pantoprazole 40 MG tablet  Commonly known as:   PROTONIX  Take 1 tablet (40 mg total) by mouth once daily.     polyethylene glycol 17 gram Pwpk  Commonly known as:  GLYCOLAX  Take 17 g by mouth once daily.     promethazine 25 MG tablet  Commonly known as:  PHENERGAN  Take 25 mg by mouth every 6 (six) hours as needed for Nausea.     senna 8.6 mg tablet  Commonly known as:  SENNA LAX  Take 2 tablets by mouth 2 (two) times daily.     tiZANidine 4 MG tablet  Commonly known as:  ZANAFLEX     traZODone 100 MG tablet  Commonly known as:  DESYREL  Take 3 tablets (300 mg total) by mouth every evening.     VITAMIN C 500 MG tablet  Generic drug:  ascorbic acid (vitamin C)  Take 500 mg by mouth once daily.        STOP taking these medications    amoxicillin-clavulanate 500-125mg 500-125 mg Tab  Commonly known as:  AUGMENTIN     ergocalciferol 50,000 unit Cap  Commonly known as:  ERGOCALCIFEROL     fluconazole 100 MG tablet  Commonly known as:  DIFLUCAN     furosemide 40 MG tablet  Commonly known as:  LASIX     lamoTRIgine 25 MG tablet  Commonly known as:  LAMICTAL     lidocaine 5 %  Commonly known as:  LIDODERM     potassium chloride SA 10 MEQ tablet  Commonly known as:  K-DUR,KLOR-CON     potassium citrate 10 mEq (1,080 mg) Tbsr  Commonly known as:  UROCIT-K     torsemide 10 MG Tab  Commonly known as:  DEMADEX          Indwelling Lines/Drains at time of discharge:   Lines/Drains/Airways     Drain                 Suprapubic Catheter 10/31/19 1317 100% silicone 20 Fr. 198 days              Time spent on the discharge of patient: 30 minutes including time coordinating with case management  Patient was seen and examined on the date of discharge and determined to be suitable for discharge.       Jennifer Callaway MD   Internal Medicine PGY 3  Department of Hospital Medicine  [facility]

## 2020-05-17 NOTE — PLAN OF CARE
05/17/20 1044   Final Note   Assessment Type Final Discharge Note   Anticipated Discharge Disposition Home-Health   What phone number can be called within the next 1-3 days to see how you are doing after discharge? [phone]   Discharge plans and expectations educations in teach back method with documentation complete? Yes   Right Care Referral Info   Post Acute Recommendation Home-care   Referral Type Home Health/ IV infusion    Facility Name [facility]  / [facility]      Future Appointments   Date Time Provider Department Center   5/18/2020  1:00 PM APPOINTMENT [facility]   5/28/2020  9:20 AM Mesfin Hodges II, MD [facility] Thier[facility]ayas [facility]   10/19/2020  3:00 PM APPOINTMENT [facility]   '

## 2020-05-17 NOTE — CARE UPDATE
Rapid Response Nurse Chart Check     Chart check completed, abnormal VS noted. Please call [phone] for further concerns or assistance.

## 2020-05-17 NOTE — PLAN OF CARE
Pt is able to discharge this morning if medically stable, Jackie with [facility] will get the meds to the home, and pt and  will be taught at home. CM notified providers.     El Pillai  RN Case Manager   #[phone]

## 2020-05-17 NOTE — PLAN OF CARE
CM received a message from the Provider that Nurse needed instruction on d/c plan. CM spoke to the Nurse and informed her that she just needs to discharge patient home, everything else is waiting on the patient. Midline to stay in place for IV abx.     El Pillai RN Case Manager   #[phone]

## 2020-05-17 NOTE — PROGRESS NOTES
[facility] Medicine  Telemedicine Progress Note    Patient Name: Tasha Hawley  MRN: [id number]  Patient Class: IP- Inpatient   Admission Date: 5/12/2020  Length of Stay: 5 days  Attending Physician: Rebecca Chery MD  Primary Care Provider: Mesfin Hodges Ii, MD    [place] [facility] Medicine Team: [facility] VIRTUAL TEAM 10 Jennifer Callaway MD    Start time: 10:15 am  Chief complaint: Urinary Tract Infection  The patient location is: [facility]  Present with the patient at the time of the telemed/virtual assessment: Tele Presenter    Subjective:     Principal Problem:Urinary tract infection associated with cystostomy catheter    Overview/Hospital Course:  Admitted for symptomatic MRSA UTI in the setting of a suprapubic catheter. On daptomycin per ID recs. Workup for source of MRSA pending. 2D echo without any vegetations, blood cultures NGTD, CT A/P without any fluid collections/abscess.    05/15/20: Patient transferred to [facility] and seen via a virtual visit. No apparent cardiopulmonary distress. Complaints of pelvic pain and has chronic indwelling dilaudid pump. Pain medication adjusted to morphine 15 mg BID and PRN vicodin 1 tab Q4H continued.   05/16: Patient seen via a virtual visit. Continues to complaint of pelvic pain that is controlled with narcotics. However, in am, BP was low borderline with systolic in 80/90's, 500 mL bolus given no evidence of volume overload and normal cardiac function. Blood cultures repeated x 2, will obtain lactic acid and cortisol levels. Patient was also bradycardic in 49-51 bpm, however is verbal without any cardiopulmonary distress. EKG repeated consistent with sinus bradycardia, no ST-T wave changes. Patient also had a concern about medication cost upon discharge. Case management working on infusion company cost. Will discharge patient home with home infusion when medically stable.     Interval History (05/17/20): Patient virtual visit conducted today.  Patient doing well, still complaints of some pelvic pressure with improvement compared to the day of arrival. Is ambulatory. Tolerating regular diet. Home Infusion IV abx set up complete per case management and to be continued till 05/25/20. Follow up weekly labs at ID clinic. Follow up urology and PCP outpatient    Review of Systems  Constitutional: Negative for chills, fatigue, fever.   HENT: Negative for sore throat, trouble swallowing.    Eyes: Negative for photophobia, visual disturbance.   Respiratory: Negative for cough, shortness of breath.    Cardiovascular: Negative for , palpitations, leg swelling. Positive for chest pain  Gastrointestinal: Negative for abdominal pain, constipation, diarrhea, nausea, vomiting.   Endocrine: Negative for cold intolerance, heat intolerance.   Genitourinary: Negative for dysuria, frequency.   Musculoskeletal: +arthralgias, +myalgias.   Skin: Negative for rash, wound, erythema   Neurological: Negative for dizziness, syncope, weakness, light-headedness. +headache.  Psychiatric/Behavioral: Negative for confusion, hallucinations, anxiety    Objective:     Vital Signs (Most Recent):  Temp: 97.9 °F (36.6 °C) (05/17/20 0500)  Pulse: (!) 57 (05/17/20 0912)  Resp: 12 (05/17/20 0912)  BP: 104/67 (05/17/20 0912)  SpO2: (!) 94 % (05/17/20 0912) Vital Signs (24h Range):  Temp:  [96.1 °F (35.6 °C)-98.2 °F (36.8 °C)] 97.9 °F (36.6 °C)  Pulse:  [44-59] 57  Resp:  [12-27] 12  SpO2:  [93 %-96 %] 94 %  BP: ()/(47-70) 104/67     Weight: 77.1 kg (170 lb)  Body mass index is 26.63 kg/m².    Intake/Output Summary (Last 24 hours) at 5/17/2020 0938  Last data filed at 5/17/2020 0700  Gross per 24 hour   Intake 450 ml   Output 1900 ml   Net -1450 ml      Physical Exam  Limited due to Tele Visit  Alert and Oriented x 4  NAD     Significant Labs:   CBC:   Recent Labs   Lab 05/16/20  0331 05/17/20  0417   WBC 5.62 4.47   HGB 10.1* 10.4*   HCT 33.6* 34.4*    164     CMP:   Recent Labs   Lab  05/16/20  0331 05/17/20  0417    142   K 4.1 4.2    104   CO2 33* 32*   GLU 90 88   BUN 8 8   CREATININE 1.0 0.8   CALCIUM 8.3* 8.5*   ANIONGAP 8 6*   EGFRNONAA >60.0 >60.0     Significant Imaging: I have reviewed all pertinent imaging results/findings within the past 24 hours.    Assessment/Plan:      MRSA UTI associated with cystostomy catheter  Neurogenic Bladdder  -Suprapubic pressure and burning reported, urine cx from 5/8 showing MRSA.  -Pt. Afebrile, hemodynamically stable, WBC normal.  -MRSA infection complicated by patient's allergies to bactrim and vancomycin. Linezolid given in ED  --ID consulted and switched to dapto; 2D echo without any vegetations, blood cultures NGTD, CT A/P without any fluid collections/abscess  -Urology consulted in ED for management of suprapubic catheter (bloody drainage noted); no need for exchange at this time per uro  --Plan to continue daptomycin for 14 days; End date 05/25/20  -- Blood cultures x2 negative 05/13/20   -- Midline placed on 05/15/20  -- Home infusion set upon discharge  -- weekly labs to be sent to ID clinic      Hypotension - Resolved upon discharge   -- Continues to complaint of pelvic pain that is controlled with narcotics.   -- 05/16 BP was low borderline with systolic in 80/90's, 500 mL bolus given no evidence of volume overload and normal cardiac function.   -- EKG with no new changes  -- blood cultures negative x 2 (05/16)  -- Lactate and cortisol is within normal limit  Assessment: Differential diagnosis include: Narcotic Induced   Plan:  --  Patient was also bradycardic in 49-51 bpm, however is verbal without any cardiopulmonary distress. EKG repeated consistent with sinus bradycardia, no ST-T wave changes.   -- encourage PO intake   -- will hold lasix  -- will hold long acting morphine, Vicodin Q6H and patient has a Dilaudid pump from home for pain relief  -- Scheduled gabapentin 100 mg TID (Home dose) for pain relief     Lymphedema of both  lower extremities  - Chronic issue that patient has had for years  - PRN lasix per pcp follow up     Primary hypothyroidism  - Continue home levothyroxine     Coronary artery disease involving native coronary artery of native heart without angina pectoris  Pure hypercholesterolemia   - Continue ASA 81 mg and statin     GERD (gastroesophageal reflux disease)  -Continue home med 40 mg pantoprazole     Chronic pain syndrome  Narcotic dependency  - Patient currently has dilaudid pump in place and uses Norco  mg at home for breakthrough pain  - Will continue home meds  - Miralax, colace, and senna ordered for bowel regimen     Generalized anxiety disorder  - Continue home meds, seroquel and trazodone     Bradycardia  - EKG sinus bradycardia  - patient asymptomatic     Diet: reg  VTE PPX: lovenox    Goals of care: full   dispo: medically stable. Will discharge home today with IV antibiotics home infusion end date 05/25/20. Follow up outpatient PCP, ID and urology.      VTE Risk Mitigation (From admission, onward)         Ordered     enoxaparin injection 40 mg  Daily      05/12/20 2044     IP VTE HIGH RISK PATIENT  Once      05/12/20 2044              I have assessed these finding virtually using telemed platform and with assistance of bedside nurse     End time:  10:30 am    Total time spent with patient: 15 minutes    The attending portion of this evaluation, treatment, and documentation was performed per Jennifer Callaway MD via audiovisual.    Jennifer Callaway MD   Internal Medicine PGY 3  Department of [facility] Medicine   [facility]

## 2020-05-19 ENCOUNTER — TELEPHONE (OUTPATIENT)
Dept: UROLOGY | Facility: CLINIC | Age: 65
End: 2020-05-19

## 2020-05-19 NOTE — TELEPHONE ENCOUNTER
Called the patient.  She states she has pain just above the level of the buttocks, it is bilateral.  No fevers, she just took temp was 97.1.  She states she gets chills.  She continues on IV abx.  The labs from 2 days ago show normal kidney function Cr 0.8, WBC was 4.47.  Reassured her unlikely to be her kidneys.  Likely more lower back pain from not moving much, can get stiff.  That, along with her anxiety can make her symptoms worse.  She expressed understanding.  Will continue with present therapy.  No appointment needed at this time.

## 2020-05-19 NOTE — TELEPHONE ENCOUNTER
----- Message from Elida Cummings sent at 5/19/2020  8:15 AM CDT -----  Contact: pt [phone]  Pt states that she was recently release from the hospital and she still have some problems. Pt is requesting a sooner appt. Please call

## 2020-05-20 ENCOUNTER — DOCUMENT SCAN (OUTPATIENT)
Dept: HOME HEALTH SERVICES | Facility: HOSPITAL | Age: 65
End: 2020-05-20
Payer: MEDICAID

## 2020-05-20 LAB
BACTERIA BLD CULT: NORMAL
BACTERIA BLD CULT: NORMAL

## 2020-05-21 ENCOUNTER — DOCUMENT SCAN (OUTPATIENT)
Dept: HOME HEALTH SERVICES | Facility: HOSPITAL | Age: 65
End: 2020-05-21
Payer: MEDICAID

## 2020-05-25 ENCOUNTER — DOCUMENT SCAN (OUTPATIENT)
Dept: HOME HEALTH SERVICES | Facility: HOSPITAL | Age: 65
End: 2020-05-25
Payer: MEDICAID

## 2020-05-27 ENCOUNTER — TELEPHONE (OUTPATIENT)
Dept: UROLOGY | Facility: CLINIC | Age: 65
End: 2020-05-27

## 2020-05-27 NOTE — TELEPHONE ENCOUNTER
----- Message from Barbara Smith RN sent at 5/26/2020  7:37 PM CDT -----      ----- Message -----  From: Rita Persaud  Sent: 5/26/2020   4:15 PM CDT  To: Maria Esther Hawley calling regarding Patient Advice (message) for low pelvic pain. Call back [phone]

## 2020-05-27 NOTE — TELEPHONE ENCOUNTER
----- Message from Andrey Miller sent at 5/27/2020 10:59 AM CDT -----  Contact: Pt  Patient called to speak w/ someone regarding  urinary incontinence, requesting callback     Callback: [phone] (home) [phone] (work)

## 2020-05-28 ENCOUNTER — TELEPHONE (OUTPATIENT)
Dept: CARDIOLOGY | Facility: CLINIC | Age: 65
End: 2020-05-28

## 2020-05-28 ENCOUNTER — DOCUMENT SCAN (OUTPATIENT)
Dept: HOME HEALTH SERVICES | Facility: HOSPITAL | Age: 65
End: 2020-05-28
Payer: MEDICAID

## 2020-05-28 ENCOUNTER — OFFICE VISIT (OUTPATIENT)
Dept: INTERNAL MEDICINE | Facility: CLINIC | Age: 65
End: 2020-05-28
Payer: MEDICARE

## 2020-05-28 ENCOUNTER — LAB VISIT (OUTPATIENT)
Dept: LAB | Facility: HOSPITAL | Age: 65
End: 2020-05-28
Attending: INTERNAL MEDICINE
Payer: MEDICARE

## 2020-05-28 VITALS
WEIGHT: 184.31 LBS | DIASTOLIC BLOOD PRESSURE: 60 MMHG | SYSTOLIC BLOOD PRESSURE: 90 MMHG | BODY MASS INDEX: 28.93 KG/M2 | HEART RATE: 64 BPM | OXYGEN SATURATION: 97 % | TEMPERATURE: 97 F | HEIGHT: 67 IN

## 2020-05-28 DIAGNOSIS — G47.01 INSOMNIA DUE TO MEDICAL CONDITION: Chronic | ICD-10-CM

## 2020-05-28 DIAGNOSIS — T83.510S URINARY TRACT INFECTION ASSOCIATED WITH CYSTOSTOMY CATHETER, SEQUELA: ICD-10-CM

## 2020-05-28 DIAGNOSIS — N31.9 NEUROGENIC BLADDER: Chronic | ICD-10-CM

## 2020-05-28 DIAGNOSIS — E03.9 PRIMARY HYPOTHYROIDISM: Chronic | ICD-10-CM

## 2020-05-28 DIAGNOSIS — R13.19 ESOPHAGEAL DYSPHAGIA: ICD-10-CM

## 2020-05-28 DIAGNOSIS — E78.2 MIXED HYPERLIPIDEMIA: ICD-10-CM

## 2020-05-28 DIAGNOSIS — I89.0 LYMPHEDEMA OF BOTH LOWER EXTREMITIES: Chronic | ICD-10-CM

## 2020-05-28 DIAGNOSIS — Z93.59 SUPRAPUBIC CATHETER: Primary | Chronic | ICD-10-CM

## 2020-05-28 DIAGNOSIS — G89.4 CHRONIC PAIN SYNDROME: Chronic | ICD-10-CM

## 2020-05-28 DIAGNOSIS — Z12.11 SCREENING FOR MALIGNANT NEOPLASM OF COLON: ICD-10-CM

## 2020-05-28 DIAGNOSIS — N39.0 URINARY TRACT INFECTION ASSOCIATED WITH CYSTOSTOMY CATHETER, SEQUELA: ICD-10-CM

## 2020-05-28 PROCEDURE — 99999 PR PBB SHADOW E&M-EST. PATIENT-LVL IV: CPT | Mod: PBBFAC,HCNC,, | Performed by: INTERNAL MEDICINE

## 2020-05-28 PROCEDURE — 99214 PR OFFICE/OUTPT VISIT, EST, LEVL IV, 30-39 MIN: ICD-10-PCS | Mod: HCNC,S$GLB,, | Performed by: INTERNAL MEDICINE

## 2020-05-28 PROCEDURE — 99999 PR PBB SHADOW E&M-EST. PATIENT-LVL IV: ICD-10-PCS | Mod: PBBFAC,HCNC,, | Performed by: INTERNAL MEDICINE

## 2020-05-28 PROCEDURE — 3008F PR BODY MASS INDEX (BMI) DOCUMENTED: ICD-10-PCS | Mod: HCNC,CPTII,S$GLB, | Performed by: INTERNAL MEDICINE

## 2020-05-28 PROCEDURE — 99214 OFFICE O/P EST MOD 30 MIN: CPT | Mod: HCNC,S$GLB,, | Performed by: INTERNAL MEDICINE

## 2020-05-28 PROCEDURE — 82274 ASSAY TEST FOR BLOOD FECAL: CPT | Mod: HCNC

## 2020-05-28 PROCEDURE — 3008F BODY MASS INDEX DOCD: CPT | Mod: HCNC,CPTII,S$GLB, | Performed by: INTERNAL MEDICINE

## 2020-05-28 PROCEDURE — 99499 UNLISTED E&M SERVICE: CPT | Mod: HCNC,S$GLB,, | Performed by: INTERNAL MEDICINE

## 2020-05-28 PROCEDURE — 99499 RISK ADDL DX/OHS AUDIT: ICD-10-PCS | Mod: HCNC,S$GLB,, | Performed by: INTERNAL MEDICINE

## 2020-05-28 RX ORDER — ATORVASTATIN CALCIUM 80 MG/1
TABLET, FILM COATED ORAL
Qty: 90 TABLET | Refills: 3 | Status: SHIPPED | OUTPATIENT
Start: 2020-05-28 | End: 2021-01-19

## 2020-05-28 RX ORDER — LEVOTHYROXINE SODIUM 125 UG/1
125 TABLET ORAL
Qty: 90 TABLET | Refills: 3 | Status: SHIPPED | OUTPATIENT
Start: 2020-05-28 | End: 2021-12-03 | Stop reason: SDUPTHER

## 2020-05-28 RX ORDER — OXYBUTYNIN CHLORIDE 15 MG/1
15 TABLET, EXTENDED RELEASE ORAL DAILY
Qty: 90 TABLET | Refills: 3 | Status: SHIPPED | OUTPATIENT
Start: 2020-05-28 | End: 2021-09-24 | Stop reason: SDUPTHER

## 2020-05-28 RX ORDER — POTASSIUM CHLORIDE 750 MG/1
20 CAPSULE, EXTENDED RELEASE ORAL DAILY
Qty: 180 CAPSULE | Refills: 3 | Status: SHIPPED | OUTPATIENT
Start: 2020-05-28 | End: 2020-08-26

## 2020-05-28 RX ORDER — FUROSEMIDE 40 MG/1
TABLET ORAL
Qty: 270 TABLET | Refills: 3 | Status: SHIPPED | OUTPATIENT
Start: 2020-05-28 | End: 2020-08-31 | Stop reason: SDUPTHER

## 2020-05-28 RX ORDER — PANTOPRAZOLE SODIUM 40 MG/1
40 TABLET, DELAYED RELEASE ORAL DAILY
Qty: 90 TABLET | Refills: 3 | Status: SHIPPED | OUTPATIENT
Start: 2020-05-28 | End: 2021-10-18

## 2020-05-28 NOTE — TELEPHONE ENCOUNTER
Spoke with pt. Wanting lymphedema clinic referral. Dr Haney stated she needed to be seen prior to being referred. Scheduled appt for Tuesday, 6/1/2020. Informed pt that no visitors were allowed to come with her. Pt voiced understanding.       Returned pt call. NA. Unable to LMOM.     ----- Message from Sharmila Delarosa sent at 5/28/2020  1:32 PM CDT -----  Contact: pt [phone]  Sara the pt would like a call in ref to some orders for her leg wrapping. She states the office has not received the orders.    Thanks     13:00

## 2020-05-28 NOTE — PROGRESS NOTES
"Chief Complaint  Chief Complaint   Patient presents with    Hospital Follow Up       HPI  Tasha Hawley is a 64 y.o. female with multiple medical diagnoses as listed in the medical history and problem list that presents for follow up after hospital admission for MRSA UTI in the setting of a suprapubic catheter. Their last appointment with primary care was 2/14/2020.      Ms. Hawley continues to report suprapubic pain that has not improved since discharge from the hospital on 5/17 after a 5 day admission. She finished her course of antibiotics on 5/25. The pain comes in episodes of sharp, shooting pain throughout the day. Her urine today was dark, cloudy and had some blood. She is concerned about green pus with some blood coming from the site of her suprapubic catheter. She also reports having a possible yeast infection. Yesterday she had a really good day overall. She was able to garden and plant some flowers, an activity she has not been able to enjoy for over a year. Today she states that "she is paying for yesterday" with notable decreased mood, increased irritability and overall feeling worse.    All medications reviewed in Epic. She requests refills.    PMHx:    Chronic insomnia. 2-3 days of sleep-onset insomnia followed by 2-3 days of lethargy and increased sleep, takes melatonin and trazodone, has remained stable overall.    Chronic low back pain with narcotic use.  Indwelling narcotic pump. Pain has been bad but stable overall. She has a pain management appointment in 2 weeks. She is worried about increasing the narcotic dose due to risk of increased fluid in her legs.    History of CVA with dysarthria. She has been stable. 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 The patient and her  deny history of CVA saying that a workup was performed but it was inconclusive.    Neurogenic bladder, with recurrent UTI's. Followed by Dr. Mayo at the urology " clinic. She has a home health nurse visit weekly. Catheter was changed yesterday.    Hypothyroidism, stable, no changes.    CECI, not on CPAP. She has a CPAP machine but has not been using it due to discomfort and waking up gasping for air when she wears it.     CAD, with ACS in Jan 2016 - subtot mid LAD lesion without PCI; ischemic CM with EF 40% and chronic LE edema. Followed by [facility]ology.    Anxiety and Depression. Feels depressed and irritable today. Overall stable with a very good day 1 day ago.    Chronic constipation, likely d/t ongoing narcotic use. She reports improvement.    Intermittent nausea, for last couple of weeks, has been taking Zofran which has helped.    LE dependent edema, reports some leg swelling today.    ALLERGIES AND MEDICATIONS: updated and reviewed.  Review of patient's allergies indicates:   Allergen Reactions    (d)-limonene flavor      Other reaction(s): difficult intubation  Other reaction(s): Difficulty breathing    Bactrim [sulfamethoxazole-trimethoprim] Anaphylaxis    Benadryl [diphenhydramine hcl] Shortness Of Breath    Imitrex [sumatriptan succinate] Shortness Of Breath    Topamax [topiramate] Shortness Of Breath    Vancomycin Shortness Of Breath     Rash    Butorphanol tartrate     Darvocet a500 [propoxyphene n-acetaminophen]      Other reaction(s): Difficulty breathing    Fentanyl      Other reaction(s): Vomiting  Other reaction(s): Nausea  Other reaction(s): Itching  swelling    White petrolatum-zinc     Zinc oxide-white petrolatum      Other reaction(s): Difficulty breathing    Latex, natural rubber Itching and Rash    Phenytoin Rash and Other (See Comments)     Trouble breathing     Current Outpatient Medications   Medication Sig Dispense Refill    ascorbic acid, vitamin C, (VITAMIN C) 500 MG tablet Take 500 mg by mouth once daily.      aspirin (ECOTRIN) 81 MG EC tablet Take 1 tablet (81 mg total) by mouth once daily.  0    atorvastatin (LIPITOR) 80  MG tablet TAKE 1 TABLET BY MOUTH DAILY 90 tablet 3    butalbital-acetaminophen-caffeine -40 mg (FIORICET, ESGIC) -40 mg per tablet Take 1 tablet by mouth every 4 (four) hours as needed for Headaches.       cholecalciferol, vitamin D3, (VITAMIN D3) 50 mcg (2,000 unit) Cap Take 1 capsule (2,000 Units total) by mouth once daily. 90 capsule 3    ferrous sulfate (FEOSOL) 325 mg (65 mg iron) Tab tablet Take 1 tablet (325 mg total) by mouth once daily. 90 tablet 1    FLUoxetine 20 MG capsule Take 3 capsules (60 mg total) by mouth once daily. 270 capsule 1    gabapentin (NEURONTIN) 100 MG capsule Take 1 capsule (100 mg total) by mouth 3 (three) times daily. 90 capsule 2    HYDROcodone-acetaminophen (NORCO)  mg per tablet Take 1 tablet by mouth every 6 (six) hours as needed for Pain. 15 tablet 0    intrathecal pain pump compound Hydromorphone (7.5 mg/mL) infusion at 3.6799 mg/day (0.1533 mg/hr) out of a total reservoir volume of 37.3 mL      levothyroxine (SYNTHROID) 125 MCG tablet Take 1 tablet (125 mcg total) by mouth before breakfast. 90 tablet 3    oxybutynin (DITROPAN XL) 15 MG TR24 Take 1 tablet (15 mg total) by mouth once daily. 90 tablet 3    oxyCODONE-acetaminophen (PERCOCET)  mg per tablet       pantoprazole (PROTONIX) 40 MG tablet Take 1 tablet (40 mg total) by mouth once daily. 90 tablet 3    polyethylene glycol (GLYCOLAX) 17 gram PwPk Take 17 g by mouth once daily. 30 each 2    promethazine (PHENERGAN) 25 MG tablet Take 25 mg by mouth every 6 (six) hours as needed for Nausea.      QUEtiapine (SEROQUEL) 100 MG Tab TAKE 1 TABLET (100 MG TOTAL) BY MOUTH EVERY EVENING. 30 tablet 0    senna (SENNA LAX) 8.6 mg tablet Take 2 tablets by mouth 2 (two) times daily.      tiZANidine (ZANAFLEX) 4 MG tablet       traZODone (DESYREL) 100 MG tablet Take 3 tablets (300 mg total) by mouth every evening. 90 tablet 0    furosemide (LASIX) 40 MG tablet Take 2 tablets every morning and one  "every evening 270 tablet 3    potassium chloride (MICRO-K) 10 MEQ CpSR Take 2 capsules (20 mEq total) by mouth once daily. for 90 doses 180 capsule 3     No current facility-administered medications for this visit.      ROS  Review of Systems   Constitutional: Positive for activity change and fatigue. Negative for appetite change, chills, diaphoresis, fever and unexpected weight change.   HENT: Negative for sore throat.    Respiratory: Negative for cough, shortness of breath and wheezing.    Cardiovascular: Positive for leg swelling. Negative for chest pain and palpitations.   Gastrointestinal: Positive for nausea. Negative for abdominal pain, constipation, diarrhea and vomiting.   Genitourinary: Positive for hematuria and pelvic pain.   Musculoskeletal: Positive for back pain. Negative for myalgias.   Skin: Positive for rash. Negative for color change, pallor and wound.   Neurological: Negative for dizziness, syncope, light-headedness, numbness and headaches.   Psychiatric/Behavioral: Positive for decreased concentration, dysphoric mood and sleep disturbance.     Physical Exam  Vitals:    05/28/20 0935   BP: 90/60   BP Location: Right arm   Patient Position: Sitting   BP Method: Large (Manual)   Pulse: 64   Temp: 96.8 °F (36 °C)   SpO2: 97%   Weight: 83.6 kg (184 lb 4.9 oz)   Height: 5' 7" (1.702 m)    Body mass index is 28.87 kg/m².  Weight: 83.6 kg (184 lb 4.9 oz)   Height: 5' 7" (170.2 cm)   Physical Exam   Constitutional: She is oriented to person, place, and time. No distress.   Pleasant, middle-aged hunched over woman with a walker   HENT:   Head: Normocephalic and atraumatic.   Abdominal:       Neurological: She is alert and oriented to person, place, and time.   Skin: She is not diaphoretic.   Psychiatric: Her behavior is normal. Judgment and thought content normal. Her mood appears not anxious. Her affect is not angry, not blunt, not labile and not inappropriate. Her speech is slurred. Her speech is not " rapid and/or pressured, not delayed and not tangential. Cognition and memory are normal. She exhibits a depressed mood. She is communicative.   Nursing note and vitals reviewed.    Assessment and Plan:    Ms. Hawley is a 63 yo woman here for a hospital follow-up. She continues to have suprapubic pain.    Suprapubic catheter  -Catheter has some clear to white discharge and is surrounded by an erythematous rash.  -Lubricate the catheter  -Continue home health for catheter changes and monitoring of urine  -Follow up with urology clinic    Urinary tract infection associated with cystostomy catheter, sequela  -Suprapubic pain has continued after completion of antibiotic course  -Consider testing for antibiotic allergy to allow utilization of different antibiotics  -Follow up with urology clinic    Mixed hyperlipidemia  -Lipid panel normal last year, continue statin  -Refill of atorvastatin (LIPITOR) 80 MG tablet; TAKE 1 TABLET BY MOUTH DAILY  Dispense: 90 tablet; Refill: 3  -Lipid panel; Future; Expected date: 05/28/2020    Esophageal dysphagia  -Symptoms well-controlled with PPI  -Refill of pantoprazole (PROTONIX) 40 MG tablet; Take 1 tablet (40 mg total) by mouth once daily.  Dispense: 90 tablet; Refill: 3    Primary hypothyroidism  -TSH has been stable, asymptomatic  -Refill of levothyroxine (SYNTHROID) 125 MCG tablet; Take 1 tablet (125 mcg total) by mouth before breakfast.  Dispense: 90 tablet; Refill: 3    Chronic pain syndrome  -Pain has been bothersome but stable  -Follow up with pain management clinic    Neurogenic bladder  -Suprapubic catheter in place, continues to have suprapubic pain  -Refill of oxybutynin (DITROPAN XL) 15 MG TR24; Take 1 tablet (15 mg total) by mouth once daily.  Dispense: 90 tablet; Refill: 3    Lymphedema of both lower extremities  -She reports a long history of nightly changes to her weight due to leg swelling  -Refill of potassium chloride (MICRO-K) 10 MEQ CpSR; Take 2 capsules (20  mEq total) by mouth once daily. for 90 doses  Dispense: 180 capsule; Refill: 3  -Refill of furosemide (LASIX) 40 MG tablet; Take 2 tablets every morning and one every evening  Dispense: 270 tablet; Refill: 3  -Comprehensive metabolic panel; Future; Expected date: 05/28/2020    Insomnia due to medical condition  -Continue melatonin and trazodone, stable however symptoms continue  -Encourage to restart CPAP use  -Improve sleep hygiene  -Follow up at next visit    Screening for malignant neoplasm of colon  -Fecal Immunochemical Test (iFOBT); Future; Expected date: 05/28/2020    I hereby acknowledge that I am relying upon documentation authored by a medical student working under my supervision and further I hereby attest that I have verified the student documentation or findings by personally performing the physical exam and medical decision making activities of the Evaluation and Management service to be billed.  Mesfin Hodges Ii    rtc 3 months    PAPO Hodges MD MPH  Staff Internist

## 2020-05-28 NOTE — ED NOTES
Pt with previous speech impediment and hearing difficulties.  Daughter states that she always has a little droop of face on left side with speech difficulties.  Denies previous stroke.   dependent (less than 25% patients effort)/maximum assist (25% patients effort)

## 2020-06-01 ENCOUNTER — PATIENT OUTREACH (OUTPATIENT)
Dept: ADMINISTRATIVE | Facility: OTHER | Age: 65
End: 2020-06-01

## 2020-06-01 NOTE — TELEPHONE ENCOUNTER
----- Message from Rita Persaud sent at 6/1/2020  1:29 PM CDT -----  JUDIE YO calling regarding Patient Advice (message) for states she is leaking urine, urine is dark and cloudy,  pressure and having pelvic pain. Is it time for botox? [phone]

## 2020-06-01 NOTE — PROGRESS NOTES
Chart reviewed.   Immunizations: updated  Orders placed: n/a  Upcoming appts to satisfy DEBORA topics: Colonoscopy 6/11

## 2020-06-02 ENCOUNTER — OFFICE VISIT (OUTPATIENT)
Dept: CARDIOLOGY | Facility: CLINIC | Age: 65
End: 2020-06-02
Payer: MEDICARE

## 2020-06-02 ENCOUNTER — TELEPHONE (OUTPATIENT)
Dept: GASTROENTEROLOGY | Facility: CLINIC | Age: 65
End: 2020-06-02

## 2020-06-02 ENCOUNTER — DOCUMENTATION ONLY (OUTPATIENT)
Dept: ENDOSCOPY | Facility: HOSPITAL | Age: 65
End: 2020-06-02

## 2020-06-02 VITALS
BODY MASS INDEX: 30.26 KG/M2 | DIASTOLIC BLOOD PRESSURE: 78 MMHG | HEART RATE: 64 BPM | SYSTOLIC BLOOD PRESSURE: 106 MMHG | HEIGHT: 64 IN | WEIGHT: 177.25 LBS

## 2020-06-02 DIAGNOSIS — I89.0 LYMPHEDEMA OF BOTH LOWER EXTREMITIES: Primary | Chronic | ICD-10-CM

## 2020-06-02 DIAGNOSIS — R60.0 LOCALIZED EDEMA: ICD-10-CM

## 2020-06-02 DIAGNOSIS — M79.661 PAIN IN BOTH LOWER LEGS: ICD-10-CM

## 2020-06-02 DIAGNOSIS — M79.662 PAIN IN BOTH LOWER LEGS: ICD-10-CM

## 2020-06-02 DIAGNOSIS — Z96.89 S/P INSERTION OF SPINAL CORD STIMULATOR: Chronic | ICD-10-CM

## 2020-06-02 DIAGNOSIS — F11.20 NARCOTIC DEPENDENCY, CONTINUOUS: Chronic | ICD-10-CM

## 2020-06-02 PROCEDURE — 3008F BODY MASS INDEX DOCD: CPT | Mod: HCNC,CPTII,S$GLB, | Performed by: INTERNAL MEDICINE

## 2020-06-02 PROCEDURE — 99999 PR PBB SHADOW E&M-EST. PATIENT-LVL V: ICD-10-PCS | Mod: PBBFAC,HCNC,, | Performed by: INTERNAL MEDICINE

## 2020-06-02 PROCEDURE — 99215 PR OFFICE/OUTPT VISIT, EST, LEVL V, 40-54 MIN: ICD-10-PCS | Mod: HCNC,S$GLB,, | Performed by: INTERNAL MEDICINE

## 2020-06-02 PROCEDURE — 99215 OFFICE O/P EST HI 40 MIN: CPT | Mod: HCNC,S$GLB,, | Performed by: INTERNAL MEDICINE

## 2020-06-02 PROCEDURE — 99999 PR PBB SHADOW E&M-EST. PATIENT-LVL V: CPT | Mod: PBBFAC,HCNC,, | Performed by: INTERNAL MEDICINE

## 2020-06-02 PROCEDURE — 3008F PR BODY MASS INDEX (BMI) DOCUMENTED: ICD-10-PCS | Mod: HCNC,CPTII,S$GLB, | Performed by: INTERNAL MEDICINE

## 2020-06-02 NOTE — PATIENT INSTRUCTIONS
Assessment/Plan:  Tasha Hawley is a 64 y.o. female with a past medical history of BLE lymphedema, CAD, lumbar radiculopathy, anxiety, hypothyroidism, CECI (on CPAP), neurogenic bladder with meadows at home (changed every 3 weeks), and chronic back pain since a work accident in 1997 (has had 12 back surgeries) on dilaudid pump, who presents for a follow up appointment.    1. BLE Lymphedema with TTP- Check BLE venous reflux study.  Refer to lymphedema clinic.  Given leg pain, refer to Neurology for evaluation.  Pt to elevate legs when resting. Continue torsemide as prescribed by PCP-Dr. Hodges (pt has close follow up with PCP).       Follow up in 2 months

## 2020-06-02 NOTE — TELEPHONE ENCOUNTER
Called and spoke to pt's .  Provided endo schedulers number for pt to call to schedule scopes.  Informed Dangelo orders for scopes were placed in 02/2020 and pt hasn't scheduled for scopes yet.   says they will be at main campus on today at 2pm for cardiology appt and pt can stop by to talk to Dr. Vance then. Informed  provider is in procedures on today and appt will need to be made for next available which is 08/2020 but pt will need scopes before hand.   appreciated the call.1

## 2020-06-02 NOTE — TELEPHONE ENCOUNTER
"----- Message from Prince Vance MD sent at 6/2/2020  9:52 AM CDT -----  Contact: [phone] or [phone]   next  ----- Message -----  From: Piper Walsh  Sent: 6/2/2020   9:49 AM CDT  To: Prince Vance MD    Next available or work her in?  ----- Message -----  From: Prince Vance MD  Sent: 6/2/2020   9:44 AM CDT  To: Piper Walsh    Please offer her a telemed video visit  ----- Message -----  From: Piper Walsh  Sent: 6/2/2020   9:07 AM CDT  To: Prince Vance MD    Please advise.  ----- Message -----  From: Brandy Montero RN  Sent: 6/2/2020   7:15 AM CDT  To: Piper Walsh    Send to Pinky  ----- Message -----  From: Piper Walsh  Sent: 6/1/2020   4:50 PM CDT  To: Brandy Montero, REJI        ----- Message -----  From: Karoline Land MA  Sent: 6/1/2020   2:13 PM CDT  To: Pinky YEN Staff      Pt wanted to let Dr Vance know that what ever food she eats is passing through her "whole" not digested and also her medication "pass through her undigested"     [phone] or [phone]             "

## 2020-06-02 NOTE — PROGRESS NOTES
Piper Vance MD   Caller: [phone] or [phone] (Yesterday,  2:13 PM)             Per Merly's office visit note 02/2020, pt is to f/u pending scopes. Pt has not had scopes yet. I'll give her the number to call.

## 2020-06-02 NOTE — TELEPHONE ENCOUNTER
----- Message from Zoe Anand sent at 6/2/2020 12:00 PM CDT -----  Contact: pt at [phone] or cell at [phone]  [id number] is calling in ref to her culture that was done on May 27.  Please call.

## 2020-06-02 NOTE — PROGRESS NOTES
"[facility]    CC: Lymphedema    Patient ID: Tasha Hawley is a 64 y.o. female with a past medical history of BLE lymphedema, CAD, lumbar radiculopathy, anxiety, hypothyroidism, CECI (on CPAP), neurogenic bladder with meadows at home (changed every 3 weeks), and chronic back pain since a work accident in 1997 (has had 12 back surgeries) on dilaudid pump, who presents for a follow up appointment.  Pertinent history/events are as follows:     -Pt kindly referred by Dr. Armenta for evaluation of BLE lymphedema.      -At our initial clinic visit on 2/6/2020, Mrs. Hawley reported have BLE edema for several years.  States BLE edema became significantly worse after intrathecal pain pump was placed.  Pt states her dilaudid dosage was recently increased, which resulted in worsening of the BLE edema.  She reports constant pain in both legs.  No tissue loss.  No smoking history.  Exam shows 2+ pitting BLE edema with changes of lymphedema.  Pt has severe tenderness to palpation of both legs with light touch.  BLE Venous Ultrasound on 1/30/2020 showed no evidence of deep venous thrombosis in either lower extremity.  Plan:   BLE Lymphedema with Severe TTP- Pt presents with BLE lymphedema with severe leg pain as described.  BLE edema/lymphedema is likely directly related to placement of intrathecal pain pump.  Recent worsening of the swelling may be due to recent increase in dilaudid dosage.  Accordingly, a study in the European Journal of Pain in 2000 (Eur J Pain. 2000;4(4):361-5) showed the following:    "Every patient who developed pedal and leg edema after the implantation of an infusion pump was also found to have leg edema and venous stasis prior to the time when the pump was inserted. This complication was severe enough to limit their physical activity, and to produce lymphedema, ulcerations and hyperpigmentation of the skin. Reduction of the edema occurred when the dose of the opiate was decreased, and in " "two cases in which the infusion was discontinued, there was almost complete resolution of the syndrome. It appears that the pre-existence of pedal edema and of venous stasis is a relative contraindication to the long-term intrathecal infusion of opiates in patients with chronic non-cancer pain."    Hence, I recommend decreasing her current dilaudid dosage.  I have discussed this in detail with Dr. Hillman (pain management) who is in agreement.  Check BLE venous reflux study and BLE arterial ultrasound.  Refer to lymphedema clinic.  Pt to elevate legs when resting. Continue torsemide as prescribed by PCP-Dr. Hodges (pt has close follow up with PCP).  Given the severe TTP, will refer to Neurology for evaluation.      -At follow up clinic visit on 3/12/2020, Mrs. Hawley reported BLE edema has improved over the past week.  Continues to have significant pain in both legs.  Unfortunately, pt was unable to make it to appointment for BLE venous reflux study, and has not been evaluated in the lymphedema clinic.  BLE Arterial Ultrasound on 3/10/2020 showed no evidence of hemodynamically significant infrainguinal PAD bilaterally.  Tri- and biphasic waveforms throughout.  Plan:   BLE Lymphedema with Severe TTP- Check BLE venous reflux study.  Refer to lymphedema clinic.  Pt to elevate legs when resting. Continue torsemide as prescribed by PCP-Dr. Hodges (pt has close follow up with PCP).   Pt is awaiting Neuro evaluation for leg pain.      HPI:  Mrs. Hawley reports some improvement in BLE edema.  Pt not yet evaluated in lymphedema clinic due to previous COVID precautions.   Exam shows BLE edema consistent with lymphedema with TTP at both shins.     Past Medical History:   Diagnosis Date    Anticoagulant long-term use     Anxiety     Arthritis     Bilateral lower extremity edema     severe chronic    Carotid artery occlusion     Cataract     Coronary artery disease     subtotalled LAD with collateral    " Depression     Fever blister     Hypothyroid     Iron deficiency anemia     Lumbar radiculopathy     with chronic pain    Ocular migraine     Sleep apnea     cpap     Past Surgical History:   Procedure Laterality Date    ADENOIDECTOMY      BACK SURGERY      x 12    CARDIAC CATHETERIZATION  2016    subtotalled LAD with right to left collaterals    CATARACT EXTRACTION W/  INTRAOCULAR LENS IMPLANT Left     Dr Coleman     CYSTOSCOPIC LITHOLAPAXY N/A 6/27/2019    Procedure: CYSTOLITHOLAPAXY;  Surgeon: Sihreen Mayo MD;  Location: [facility] OR 2ND FLR;  Service: Urology;  Laterality: N/A;    CYSTOSCOPIC LITHOLAPAXY N/A 9/3/2019    Procedure: CYSTOLITHOLAPAXY;  Surgeon: Shireen Mayo MD;  Location: [facility] OR 2ND FLR;  Service: Urology;  Laterality: N/A;    ESOPHAGOGASTRODUODENOSCOPY N/A 5/23/2018    Procedure: ESOPHAGOGASTRODUODENOSCOPY (EGD);  Surgeon: Prince Vance MD;  Location: [facility] (4TH FLR);  Service: Endoscopy;  Laterality: N/A;  r/s 'd per Dr. Vance due to family emergency- ER    HYSTERECTOMY  1975    endometriosis    pain pump placement      SQ Dilaudid Pump managed by Dr. Hillman, Pain Management    REPLACEMENT OF CATHETER N/A 10/31/2019    Procedure: REPLACEMENT, CATHETER-SUPRAPUBIC;  Surgeon: Shireen Mayo MD;  Location: [facility] OR 1ST FLR;  Service: Urology;  Laterality: N/A;    SPINAL CORD STIMULATOR REMOVAL      before [place]    SPINE SURGERY  5-13-13    CERVICAL FUSION    TONSILLECTOMY       Social History     Socioeconomic History    Marital status:      Spouse name: Not on file    Number of children: Not on file    Years of education: Not on file    Highest education level: Not on file   Occupational History    Not on file   Social Needs    Financial resource strain: Not on file    Food insecurity:     Worry: Not on file     Inability: Not on file    Transportation needs:     Medical: Not on file     Non-medical: Not on file   Tobacco Use    Smoking  status: Never Smoker    Smokeless tobacco: Never Used   Substance and Sexual Activity    Alcohol use: Never     Frequency: Never    Drug use: No    Sexual activity: Never     Partners: Male   Lifestyle    Physical activity:     Days per week: Not on file     Minutes per session: Not on file    Stress: Not on file   Relationships    Social connections:     Talks on phone: Not on file     Gets together: Not on file     Attends Christianity service: Not on file     Active member of club or organization: Not on file     Attends meetings of clubs or organizations: Not on file     Relationship status: Not on file   Other Topics Concern    Are you pregnant or think you may be? Not Asked    Breast-feeding Not Asked   Social History Narrative    Not on file     Family History   Problem Relation Age of Onset    Cancer Mother 55        breast    Cancer Father         esophagus,had laryngectomy    Esophageal cancer Father     Parkinsonism Maternal Grandmother     Tremor Maternal Grandmother     No Known Problems Brother     No Known Problems Brother     Heart disease Maternal Uncle     Colon cancer Maternal Uncle         Less than 60    No Known Problems Sister     No Known Problems Maternal Aunt     Cirrhosis Paternal Aunt         ETOH    Liver disease Paternal Aunt         ETOH    Liver disease Paternal Uncle         ETOH    Cirrhosis Paternal Uncle         ETOH    No Known Problems Maternal Grandfather     No Known Problems Paternal Grandmother     No Known Problems Paternal Grandfather     Melanoma Neg Hx     Amblyopia Neg Hx     Blindness Neg Hx     Cataracts Neg Hx     Diabetes Neg Hx     Glaucoma Neg Hx     Hypertension Neg Hx     Macular degeneration Neg Hx     Retinal detachment Neg Hx     Strabismus Neg Hx     Stroke Neg Hx     Thyroid disease Neg Hx     Celiac disease Neg Hx     Colon polyps Neg Hx     Cystic fibrosis Neg Hx     Crohn's disease Neg Hx     Inflammatory bowel  disease Neg Hx     Liver cancer Neg Hx     Rectal cancer Neg Hx     Stomach cancer Neg Hx     Ulcerative colitis Neg Hx     Lymphoma Neg Hx        Review of patient's allergies indicates:   Allergen Reactions    (d)-limonene flavor      Other reaction(s): difficult intubation  Other reaction(s): Difficulty breathing    Bactrim [sulfamethoxazole-trimethoprim] Anaphylaxis    Benadryl [diphenhydramine hcl] Shortness Of Breath    Imitrex [sumatriptan succinate] Shortness Of Breath    Topamax [topiramate] Shortness Of Breath    Vancomycin Shortness Of Breath     Rash    Butorphanol tartrate     Darvocet a500 [propoxyphene n-acetaminophen]      Other reaction(s): Difficulty breathing    Fentanyl      Other reaction(s): Vomiting  Other reaction(s): Nausea  Other reaction(s): Itching  swelling    White petrolatum-zinc     Zinc oxide-white petrolatum      Other reaction(s): Difficulty breathing    Latex, natural rubber Itching and Rash    Phenytoin Rash and Other (See Comments)     Trouble breathing       Medication List with Changes/Refills   Current Medications    ASCORBIC ACID, VITAMIN C, (VITAMIN C) 500 MG TABLET    Take 500 mg by mouth once daily.    ASPIRIN (ECOTRIN) 81 MG EC TABLET    Take 1 tablet (81 mg total) by mouth once daily.    ATORVASTATIN (LIPITOR) 80 MG TABLET    TAKE 1 TABLET BY MOUTH DAILY    BUTALBITAL-ACETAMINOPHEN-CAFFEINE -40 MG (FIORICET, ESGIC) -40 MG PER TABLET    Take 1 tablet by mouth every 4 (four) hours as needed for Headaches.     CHOLECALCIFEROL, VITAMIN D3, (VITAMIN D3) 50 MCG (2,000 UNIT) CAP    Take 1 capsule (2,000 Units total) by mouth once daily.    FERROUS SULFATE (FEOSOL) 325 MG (65 MG IRON) TAB TABLET    Take 1 tablet (325 mg total) by mouth once daily.    FLUOXETINE 20 MG CAPSULE    Take 3 capsules (60 mg total) by mouth once daily.    FUROSEMIDE (LASIX) 40 MG TABLET    Take 2 tablets every morning and one every evening    GABAPENTIN (NEURONTIN) 100 MG  "CAPSULE    Take 1 capsule (100 mg total) by mouth 3 (three) times daily.    HYDROCODONE-ACETAMINOPHEN (NORCO)  MG PER TABLET    Take 1 tablet by mouth every 6 (six) hours as needed for Pain.    INTRATHECAL PAIN PUMP COMPOUND    Hydromorphone (7.5 mg/mL) infusion at 3.6799 mg/day (0.1533 mg/hr) out of a total reservoir volume of 37.3 mL    LEVOTHYROXINE (SYNTHROID) 125 MCG TABLET    Take 1 tablet (125 mcg total) by mouth before breakfast.    OXYBUTYNIN (DITROPAN XL) 15 MG TR24    Take 1 tablet (15 mg total) by mouth once daily.    OXYCODONE-ACETAMINOPHEN (PERCOCET)  MG PER TABLET        PANTOPRAZOLE (PROTONIX) 40 MG TABLET    Take 1 tablet (40 mg total) by mouth once daily.    POLYETHYLENE GLYCOL (GLYCOLAX) 17 GRAM PWPK    Take 17 g by mouth once daily.    POTASSIUM CHLORIDE (MICRO-K) 10 MEQ CPSR    Take 2 capsules (20 mEq total) by mouth once daily. for 90 doses    PROMETHAZINE (PHENERGAN) 25 MG TABLET    Take 25 mg by mouth every 6 (six) hours as needed for Nausea.    QUETIAPINE (SEROQUEL) 100 MG TAB    TAKE 1 TABLET (100 MG TOTAL) BY MOUTH EVERY EVENING.    SENNA (SENNA LAX) 8.6 MG TABLET    Take 2 tablets by mouth 2 (two) times daily.    TIZANIDINE (ZANAFLEX) 4 MG TABLET        TRAZODONE (DESYREL) 100 MG TABLET    Take 3 tablets (300 mg total) by mouth every evening.       Review of Systems  Constitution: Denies chills, fever, and sweats.  HENT: Denies headaches or blurry vision.  Cardiovascular: Denies chest pain or irregular heart beat.  Respiratory: Denies cough or shortness of breath.  Gastrointestinal: Denies abdominal pain, nausea, or vomiting.  Musculoskeletal: Denies muscle cramps.  Neurological: Denies dizziness or focal weakness.  Psychiatric/Behavioral: Normal mental status.  Hematologic/Lymphatic: Denies bleeding problem or easy bruising/bleeding.  Skin: Denies rash or suspicious lesions    Physical Examination  Ht 5' 4" (1.626 m)   Wt 80.4 kg (177 lb 4 oz)   LMP  (LMP Unknown)   BMI " 30.42 kg/m²     Constitutional: No acute distress, conversant  HEENT: Sclera anicteric, Pupils equal, round and reactive to light, extraocular motions intact, Oropharynx clear  Neck: No JVD, no carotid bruits  Cardiovascular: regular rate and rhythm, no murmur, rubs or gallops, normal S1/S2  Pulmonary: Clear to auscultation bilaterally  Abdominal: Abdomen soft, nontender, nondistended, positive bowel sounds  Extremities:  1 pitting BLE edema with changes of lymphedema.  TTP at both shins    Pulses:  Carotid pulses are 2+ on the right side, and 2+ on the left side.  Radial pulses are 2+ on the right side, and 2+ on the left side.   Femoral pulses are 2+ on the right side, and 2+ on the left side.  Popliteal pulses are 2+ on the right side, and 2+ on the left side.   Dorsalis pedis pulses are 2+ on the right side, and 2+ on the left side.   Posterior tibial pulses are 2+ on the right side, and 2+ on the left side.    Skin: No ecchymosis, erythema, or ulcers  Psych: Alert and oriented x 3, appropriate affect  Neuro: CNII-XII intact, no focal deficits    Labs:  Most Recent Data  CBC:   Lab Results   Component Value Date    WBC 4.46 05/19/2020    HGB 11.6 (L) 05/19/2020    HCT 37.2 05/19/2020     05/19/2020    MCV 89 05/19/2020    RDW 14.1 05/19/2020     BMP:   Lab Results   Component Value Date     06/01/2020    K 3.8 06/01/2020     06/01/2020    CO2 32 (H) 06/01/2020    BUN 11 06/01/2020    CREATININE 0.70 06/01/2020     (H) 06/01/2020    CALCIUM 9.4 06/01/2020    MG 2.2 05/17/2020    PHOS 2.9 01/31/2020     LFTS;   Lab Results   Component Value Date    PROT 7.1 06/01/2020    ALBUMIN 3.7 06/01/2020    BILITOT 0.5 06/01/2020    AST 53 (H) 06/01/2020    ALKPHOS 100 06/01/2020    ALT 20 06/01/2020    GGT 51 04/30/2015     COAGS:   Lab Results   Component Value Date    INR 0.9 05/27/2014     FLP:   Lab Results   Component Value Date    CHOL 152 06/01/2020    HDL 42 06/01/2020    LDLCALC 90.0  06/01/2020    TRIG 100 06/01/2020    CHOLHDL 27.6 06/01/2020     CARDIAC:   Lab Results   Component Value Date    TROPONINI 0.015 05/13/2020    BNP 17 01/27/2020     BLE Arterial Ultrasound 3/10/2020:  No evidence of hemodynamically significant infrainguinal PAD bilaterally.  Tri- and biphasic waveforms throughout.  Borderline normal KENDALL bilaterally.    BLE Venous Ultrasound 1/30/2020:  No evidence of deep venous thrombosis in either lower extremity.    Assessment/Plan:  Tasha Hawley is a 64 y.o. female with a past medical history of BLE lymphedema, CAD, lumbar radiculopathy, anxiety, hypothyroidism, CECI (on CPAP), neurogenic bladder with meadows at home (changed every 3 weeks), and chronic back pain since a work accident in 1997 (has had 12 back surgeries) on dilaudid pump, who presents for a follow up appointment.    1. BLE Lymphedema with TTP- Check BLE venous reflux study.  Refer to lymphedema clinic.  Given leg pain, refer to Neurology for evaluation.  Pt to elevate legs when resting. Continue torsemide as prescribed by PCP-Dr. Hodges (pt has close follow up with PCP).       Follow up in 2 months    Total duration of face to face visit time 30 minutes.  Total time spent counseling greater than fifty percent of total visit time.  Counseling included discussion regarding imaging findings, diagnosis, possibilities, treatment options, risks and benefits.  The patient had many questions regarding the options and long-term effects.    Kalpesh Haney MD, PhD  Interventional Cardiology

## 2020-06-03 LAB — HEMOCCULT STL QL IA: NEGATIVE

## 2020-06-03 NOTE — TELEPHONE ENCOUNTER
----- Message from Sis Calixto sent at 6/3/2020  9:48 AM CDT -----  Contact: pt: [phone]  Pt is calling to speak with nurse       Please contact pt: [phone]

## 2020-06-04 ENCOUNTER — OFFICE VISIT (OUTPATIENT)
Dept: UROLOGY | Facility: CLINIC | Age: 65
End: 2020-06-04
Payer: MEDICARE

## 2020-06-04 ENCOUNTER — TELEPHONE (OUTPATIENT)
Dept: UROLOGY | Facility: CLINIC | Age: 65
End: 2020-06-04

## 2020-06-04 DIAGNOSIS — N32.89 BLADDER SPASMS: ICD-10-CM

## 2020-06-04 DIAGNOSIS — B37.49 CANDIDA UTI: ICD-10-CM

## 2020-06-04 DIAGNOSIS — N31.9 NEUROGENIC BLADDER: Primary | ICD-10-CM

## 2020-06-04 PROCEDURE — 99442 PR PHYSICIAN TELEPHONE EVALUATION 11-20 MIN: ICD-10-PCS | Mod: HCNC,95,, | Performed by: UROLOGY

## 2020-06-04 PROCEDURE — 99442 PR PHYSICIAN TELEPHONE EVALUATION 11-20 MIN: CPT | Mod: HCNC,95,, | Performed by: UROLOGY

## 2020-06-04 NOTE — TELEPHONE ENCOUNTER
----- Message from Ernesto Paredes sent at 6/4/2020 11:00 AM CDT -----  Contact: Sraay with [facility]      The caller states that she would like a call back about the Pt's urine culture results she received and the Pt is still have symptoms of a UTI and having pelvic pressure.    Phone # [phone]

## 2020-06-04 NOTE — PROGRESS NOTES
The patient location is:  home  The chief complaint leading to consultation is: neurogenic bladder, in a patient with history of incomplete bladder emptying, recurrent UTI, status post autologous sling    Visit type: audio only    Time with patient:  12 minutes of total time spent on the encounter, which includes face to face time and non-face to face time preparing to see the patient (eg, review of tests), obtaining and/or reviewing separately obtained history, documenting clinical information in the electronic or other health record, independently interpreting results (not separately reported) and communicating results to the patient/family/caregiver, or care coordination (not separately reported).     Each patient to whom he or she provides medical services by telemedicine is:  (1) informed of the relationship between the physician and patient and the respective role of any other health care provider with respect to management of the patient; and (2) notified that he or she may decline to receive medical services by telemedicine and may withdraw from such care at any time.      CHIEF COMPLAINT:    Mrs. Hawley is a 64 y.o. female presenting for a telephone visit for bladder spasms history of neurogenic bladder, in a patient with history of incomplete bladder emptying, recurrent UTI, status post autologous sling      PRESENTING ILLNESS:    Tasha Hawley is a 64 y.o. female who states that she has had ongoing bladder spasms and when that occurs, she leaks from below.  She has a suprapubic tube in place, it was just changed and a culture was sent from the new tube.  She had 50-100K stenotrophonmonas maltophilia and 50-100K yeast.  The patient also reports a rash underneath her breasts and around the suprapubic tube where it rests on her abdomen.  She states she felt cold going to bed yesterday.  No fevers.  The patient has a lot of anxiety around her bladder.  She just finished IV antibiotics for a multi  drug resistant organism.  She was last injected with Botox on 10/31/2019.  Prior to that, she was injected in March 2019    REVIEW OF SYSTEMS:    Review of Systems   Constitutional: Negative.    HENT: Negative.    Eyes: Negative.    Respiratory: Negative.    Cardiovascular: Negative.    Gastrointestinal: Negative.    Genitourinary: Positive for urgency.        Bladder spasm and urge incontinence   Musculoskeletal: Positive for back pain and joint pain.   Skin: Negative.    Neurological: Positive for sensory change and weakness.   Endo/Heme/Allergies: Negative.    Psychiatric/Behavioral: The patient is nervous/anxious.        PATIENT HISTORY:    Past Medical History:   Diagnosis Date    Anticoagulant long-term use     Anxiety     Arthritis     Bilateral lower extremity edema     severe chronic    Carotid artery occlusion     Cataract     Coronary artery disease     subtotalled LAD with collateral    Depression     Fever blister     Hypothyroid     Iron deficiency anemia     Lumbar radiculopathy     with chronic pain    Ocular migraine     Sleep apnea     cpap       Past Surgical History:   Procedure Laterality Date    ADENOIDECTOMY      BACK SURGERY      x 12    CARDIAC CATHETERIZATION  2016    subtotalled LAD with right to left collaterals    CATARACT EXTRACTION W/  INTRAOCULAR LENS IMPLANT Left     Dr Coleman     CYSTOSCOPIC LITHOLAPAXY N/A 6/27/2019    Procedure: CYSTOLITHOLAPAXY;  Surgeon: Shireen Mayo MD;  Location: [facility] OR [address] [place] [address];  Service: Urology;  Laterality: N/A;    CYSTOSCOPIC LITHOLAPAXY N/A 9/3/2019    Procedure: CYSTOLITHOLAPAXY;  Surgeon: Shireen Mayo MD;  Location: [address];  Service: Urology;  Laterality: N/A;    ESOPHAGOGASTRODUODENOSCOPY N/A 5/23/2018    Procedure: ESOPHAGOGASTRODUODENOSCOPY (EGD);  Surgeon: Prince Vance MD;  Location: [facility] ([address] [place] [address]);  Service: Endoscopy;  Laterality: N/A;  r/s 'd per Dr. Vance due to family emergency- ER     HYSTERECTOMY  1975    endometriosis    pain pump placement      SQ Dilaudid Pump managed by Dr. Hillman, Pain Management    REPLACEMENT OF CATHETER N/A 10/31/2019    Procedure: REPLACEMENT, CATHETER-SUPRAPUBIC;  Surgeon: Shireen Mayo MD;  Location: [facility] OR [address] [place] [address];  Service: Urology;  Laterality: N/A;    SPINAL CORD STIMULATOR REMOVAL      before [place]    SPINE SURGERY  5-13-13    CERVICAL FUSION    TONSILLECTOMY         Family History   Problem Relation Age of Onset    Cancer Mother 55        breast    Cancer Father         esophagus,had laryngectomy    Esophageal cancer Father     Parkinsonism Maternal Grandmother     Tremor Maternal Grandmother     No Known Problems Brother     No Known Problems Brother     Heart disease Maternal Uncle     Colon cancer Maternal Uncle         Less than 60    No Known Problems Sister     No Known Problems Maternal Aunt     Cirrhosis Paternal Aunt         ETOH    Liver disease Paternal Aunt         ETOH    Liver disease Paternal Uncle         ETOH    Cirrhosis Paternal Uncle         ETOH     Socioeconomic History    Marital status:    Tobacco Use    Smoking status: Never Smoker    Smokeless tobacco: Never Used   Substance and Sexual Activity    Alcohol use: Never     Frequency: Never    Drug use: No    Sexual activity: Never     Partners: Male       Allergies:  (d)-limonene flavor; Bactrim [sulfamethoxazole-trimethoprim]; Benadryl [diphenhydramine hcl]; Imitrex [sumatriptan succinate]; Topamax [topiramate]; Vancomycin; Butorphanol tartrate; Darvocet a500 [propoxyphene n-acetaminophen]; Fentanyl; White petrolatum-zinc; Zinc oxide-white petrolatum; Latex, natural rubber; and Phenytoin    Medications:  Outpatient Encounter Medications as of 6/4/2020   Medication Sig Dispense Refill    ascorbic acid, vitamin C, (VITAMIN C) 500 MG tablet Take 500 mg by mouth once daily.      aspirin (ECOTRIN) 81 MG EC tablet Take 1 tablet (81 mg total) by mouth  once daily.  0    atorvastatin (LIPITOR) 80 MG tablet TAKE 1 TABLET BY MOUTH DAILY 90 tablet 3    butalbital-acetaminophen-caffeine -40 mg (FIORICET, ESGIC) -40 mg per tablet Take 1 tablet by mouth every 4 (four) hours as needed for Headaches.       cholecalciferol, vitamin D3, (VITAMIN D3) 50 mcg (2,000 unit) Cap Take 1 capsule (2,000 Units total) by mouth once daily. (Patient not taking: Reported on 6/2/2020) 90 capsule 3    ferrous sulfate (FEOSOL) 325 mg (65 mg iron) Tab tablet Take 1 tablet (325 mg total) by mouth once daily. 90 tablet 1    FLUoxetine 20 MG capsule Take 3 capsules (60 mg total) by mouth once daily. 270 capsule 1    furosemide (LASIX) 40 MG tablet Take 2 tablets every morning and one every evening 270 tablet 3    gabapentin (NEURONTIN) 100 MG capsule Take 1 capsule (100 mg total) by mouth 3 (three) times daily. (Patient not taking: Reported on 6/2/2020) 90 capsule 2    HYDROcodone-acetaminophen (NORCO)  mg per tablet Take 1 tablet by mouth every 6 (six) hours as needed for Pain. 15 tablet 0    intrathecal pain pump compound Hydromorphone (7.5 mg/mL) infusion at 3.6799 mg/day (0.1533 mg/hr) out of a total reservoir volume of 37.3 mL      levothyroxine (SYNTHROID) 125 MCG tablet Take 1 tablet (125 mcg total) by mouth before breakfast. 90 tablet 3    oxybutynin (DITROPAN XL) 15 MG TR24 Take 1 tablet (15 mg total) by mouth once daily. 90 tablet 3    oxyCODONE-acetaminophen (PERCOCET)  mg per tablet       pantoprazole (PROTONIX) 40 MG tablet Take 1 tablet (40 mg total) by mouth once daily. 90 tablet 3    polyethylene glycol (GLYCOLAX) 17 gram PwPk Take 17 g by mouth once daily. (Patient not taking: Reported on 6/2/2020) 30 each 2    potassium chloride (MICRO-K) 10 MEQ CpSR Take 2 capsules (20 mEq total) by mouth once daily. for 90 doses 180 capsule 3    promethazine (PHENERGAN) 25 MG tablet Take 25 mg by mouth every 6 (six) hours as needed for Nausea.       QUEtiapine (SEROQUEL) 100 MG Tab TAKE 1 TABLET (100 MG TOTAL) BY MOUTH EVERY EVENING. 30 tablet 0    senna (SENNA LAX) 8.6 mg tablet Take 2 tablets by mouth 2 (two) times daily.      tiZANidine (ZANAFLEX) 4 MG tablet       traZODone (DESYREL) 100 MG tablet Take 3 tablets (300 mg total) by mouth every evening. 90 tablet 0     No facility-administered encounter medications on file as of 6/4/2020.          PHYSICAL EXAMINATION:    The patient generally sounds in good health, is appropriately interactive, and is in no apparent distress.    Mental: Cooperative with some anxiety    Chest:  normal inspiratory effort.  Breathing is unlabored        LABS:    Lab Results   Component Value Date    BUN 11 06/01/2020    CREATININE 0.70 06/01/2020         IMPRESSION:    Encounter Diagnoses   Name Primary?    Neurogenic bladder Yes    Candida UTI     Bladder spasms        PLAN:    1.  Diflucan 200 mg qd x 7 days. For the skin and bladder.  Will not treat the stenotrophomonas since she just had the catheter changed.    2.  Will plan to do cystoscopy Botox injection of the bladder with 200 U  3.  She will need to be covid tested the am of her procedure unless she develops symptoms, then will have her get tested through the drive through.

## 2020-06-04 NOTE — H&P (VIEW-ONLY)
The patient location is:  home  The chief complaint leading to consultation is: neurogenic bladder, in a patient with history of incomplete bladder emptying, recurrent UTI, status post autologous sling    Visit type: audio only    Time with patient:  12 minutes of total time spent on the encounter, which includes face to face time and non-face to face time preparing to see the patient (eg, review of tests), obtaining and/or reviewing separately obtained history, documenting clinical information in the electronic or other health record, independently interpreting results (not separately reported) and communicating results to the patient/family/caregiver, or care coordination (not separately reported).     Each patient to whom he or she provides medical services by telemedicine is:  (1) informed of the relationship between the physician and patient and the respective role of any other health care provider with respect to management of the patient; and (2) notified that he or she may decline to receive medical services by telemedicine and may withdraw from such care at any time.      CHIEF COMPLAINT:    Mrs. Hawley is a 64 y.o. female presenting for a telephone visit for bladder spasms history of neurogenic bladder, in a patient with history of incomplete bladder emptying, recurrent UTI, status post autologous sling      PRESENTING ILLNESS:    Tasha Hawley is a 64 y.o. female who states that she has had ongoing bladder spasms and when that occurs, she leaks from below.  She has a suprapubic tube in place, it was just changed and a culture was sent from the new tube.  She had 50-100K stenotrophonmonas maltophilia and 50-100K yeast.  The patient also reports a rash underneath her breasts and around the suprapubic tube where it rests on her abdomen.  She states she felt cold going to bed yesterday.  No fevers.  The patient has a lot of anxiety around her bladder.  She just finished IV antibiotics for a multi  drug resistant organism.  She was last injected with Botox on 10/31/2019.  Prior to that, she was injected in March 2019    REVIEW OF SYSTEMS:    Review of Systems   Constitutional: Negative.    HENT: Negative.    Eyes: Negative.    Respiratory: Negative.    Cardiovascular: Negative.    Gastrointestinal: Negative.    Genitourinary: Positive for urgency.        Bladder spasm and urge incontinence   Musculoskeletal: Positive for back pain and joint pain.   Skin: Negative.    Neurological: Positive for sensory change and weakness.   Endo/Heme/Allergies: Negative.    Psychiatric/Behavioral: The patient is nervous/anxious.        PATIENT HISTORY:    Past Medical History:   Diagnosis Date    Anticoagulant long-term use     Anxiety     Arthritis     Bilateral lower extremity edema     severe chronic    Carotid artery occlusion     Cataract     Coronary artery disease     subtotalled LAD with collateral    Depression     Fever blister     Hypothyroid     Iron deficiency anemia     Lumbar radiculopathy     with chronic pain    Ocular migraine     Sleep apnea     cpap       Past Surgical History:   Procedure Laterality Date    ADENOIDECTOMY      BACK SURGERY      x 12    CARDIAC CATHETERIZATION  2016    subtotalled LAD with right to left collaterals    CATARACT EXTRACTION W/  INTRAOCULAR LENS IMPLANT Left     Dr Coleman     CYSTOSCOPIC LITHOLAPAXY N/A 6/27/2019    Procedure: CYSTOLITHOLAPAXY;  Surgeon: Shireen Mayo MD;  Location: [facility] OR [address] [place] [address];  Service: Urology;  Laterality: N/A;    CYSTOSCOPIC LITHOLAPAXY N/A 9/3/2019    Procedure: CYSTOLITHOLAPAXY;  Surgeon: Shireen Mayo MD;  Location: [address];  Service: Urology;  Laterality: N/A;    ESOPHAGOGASTRODUODENOSCOPY N/A 5/23/2018    Procedure: ESOPHAGOGASTRODUODENOSCOPY (EGD);  Surgeon: Prince Vance MD;  Location: [facility] ([address] [place] [address]);  Service: Endoscopy;  Laterality: N/A;  r/s 'd per Dr. Vance due to family emergency- ER     HYSTERECTOMY  1975    endometriosis    pain pump placement      SQ Dilaudid Pump managed by Dr. Hillman, Pain Management    REPLACEMENT OF CATHETER N/A 10/31/2019    Procedure: REPLACEMENT, CATHETER-SUPRAPUBIC;  Surgeon: Shireen Mayo MD;  Location: [facility] OR [address] [place] [address];  Service: Urology;  Laterality: N/A;    SPINAL CORD STIMULATOR REMOVAL      before [place]    SPINE SURGERY  5-13-13    CERVICAL FUSION    TONSILLECTOMY         Family History   Problem Relation Age of Onset    Cancer Mother 55        breast    Cancer Father         esophagus,had laryngectomy    Esophageal cancer Father     Parkinsonism Maternal Grandmother     Tremor Maternal Grandmother     No Known Problems Brother     No Known Problems Brother     Heart disease Maternal Uncle     Colon cancer Maternal Uncle         Less than 60    No Known Problems Sister     No Known Problems Maternal Aunt     Cirrhosis Paternal Aunt         ETOH    Liver disease Paternal Aunt         ETOH    Liver disease Paternal Uncle         ETOH    Cirrhosis Paternal Uncle         ETOH     Socioeconomic History    Marital status:    Tobacco Use    Smoking status: Never Smoker    Smokeless tobacco: Never Used   Substance and Sexual Activity    Alcohol use: Never     Frequency: Never    Drug use: No    Sexual activity: Never     Partners: Male       Allergies:  (d)-limonene flavor; Bactrim [sulfamethoxazole-trimethoprim]; Benadryl [diphenhydramine hcl]; Imitrex [sumatriptan succinate]; Topamax [topiramate]; Vancomycin; Butorphanol tartrate; Darvocet a500 [propoxyphene n-acetaminophen]; Fentanyl; White petrolatum-zinc; Zinc oxide-white petrolatum; Latex, natural rubber; and Phenytoin    Medications:  Outpatient Encounter Medications as of 6/4/2020   Medication Sig Dispense Refill    ascorbic acid, vitamin C, (VITAMIN C) 500 MG tablet Take 500 mg by mouth once daily.      aspirin (ECOTRIN) 81 MG EC tablet Take 1 tablet (81 mg total) by mouth  once daily.  0    atorvastatin (LIPITOR) 80 MG tablet TAKE 1 TABLET BY MOUTH DAILY 90 tablet 3    butalbital-acetaminophen-caffeine -40 mg (FIORICET, ESGIC) -40 mg per tablet Take 1 tablet by mouth every 4 (four) hours as needed for Headaches.       cholecalciferol, vitamin D3, (VITAMIN D3) 50 mcg (2,000 unit) Cap Take 1 capsule (2,000 Units total) by mouth once daily. (Patient not taking: Reported on 6/2/2020) 90 capsule 3    ferrous sulfate (FEOSOL) 325 mg (65 mg iron) Tab tablet Take 1 tablet (325 mg total) by mouth once daily. 90 tablet 1    FLUoxetine 20 MG capsule Take 3 capsules (60 mg total) by mouth once daily. 270 capsule 1    furosemide (LASIX) 40 MG tablet Take 2 tablets every morning and one every evening 270 tablet 3    gabapentin (NEURONTIN) 100 MG capsule Take 1 capsule (100 mg total) by mouth 3 (three) times daily. (Patient not taking: Reported on 6/2/2020) 90 capsule 2    HYDROcodone-acetaminophen (NORCO)  mg per tablet Take 1 tablet by mouth every 6 (six) hours as needed for Pain. 15 tablet 0    intrathecal pain pump compound Hydromorphone (7.5 mg/mL) infusion at 3.6799 mg/day (0.1533 mg/hr) out of a total reservoir volume of 37.3 mL      levothyroxine (SYNTHROID) 125 MCG tablet Take 1 tablet (125 mcg total) by mouth before breakfast. 90 tablet 3    oxybutynin (DITROPAN XL) 15 MG TR24 Take 1 tablet (15 mg total) by mouth once daily. 90 tablet 3    oxyCODONE-acetaminophen (PERCOCET)  mg per tablet       pantoprazole (PROTONIX) 40 MG tablet Take 1 tablet (40 mg total) by mouth once daily. 90 tablet 3    polyethylene glycol (GLYCOLAX) 17 gram PwPk Take 17 g by mouth once daily. (Patient not taking: Reported on 6/2/2020) 30 each 2    potassium chloride (MICRO-K) 10 MEQ CpSR Take 2 capsules (20 mEq total) by mouth once daily. for 90 doses 180 capsule 3    promethazine (PHENERGAN) 25 MG tablet Take 25 mg by mouth every 6 (six) hours as needed for Nausea.       QUEtiapine (SEROQUEL) 100 MG Tab TAKE 1 TABLET (100 MG TOTAL) BY MOUTH EVERY EVENING. 30 tablet 0    senna (SENNA LAX) 8.6 mg tablet Take 2 tablets by mouth 2 (two) times daily.      tiZANidine (ZANAFLEX) 4 MG tablet       traZODone (DESYREL) 100 MG tablet Take 3 tablets (300 mg total) by mouth every evening. 90 tablet 0     No facility-administered encounter medications on file as of 6/4/2020.          PHYSICAL EXAMINATION:    The patient generally sounds in good health, is appropriately interactive, and is in no apparent distress.    Mental: Cooperative with some anxiety    Chest:  normal inspiratory effort.  Breathing is unlabored        LABS:    Lab Results   Component Value Date    BUN 11 06/01/2020    CREATININE 0.70 06/01/2020         IMPRESSION:    Encounter Diagnoses   Name Primary?    Neurogenic bladder Yes    Candida UTI     Bladder spasms        PLAN:    1.  Diflucan 200 mg qd x 7 days. For the skin and bladder.  Will not treat the stenotrophomonas since she just had the catheter changed.    2.  Will plan to do cystoscopy Botox injection of the bladder with 200 U  3.  She will need to be covid tested the am of her procedure unless she develops symptoms, then will have her get tested through the drive through.

## 2020-06-04 NOTE — TELEPHONE ENCOUNTER
Returned call to Saray ( nurse), states pt has +UCx results that were faxed over from [facility]. Pt c/o pain and bladder pressure and is also noted to have visible yeast in urine, around sptube site and under breast. She advised pt to use an antifungal powder for under breast for relief.     Notified that an audio visit has been scheduled this afternoon (6/4/20) to discuss botox and recurrent UTIs.

## 2020-06-09 ENCOUNTER — TELEPHONE (OUTPATIENT)
Dept: UROLOGY | Facility: CLINIC | Age: 65
End: 2020-06-09

## 2020-06-09 ENCOUNTER — TELEPHONE (OUTPATIENT)
Dept: INTERNAL MEDICINE | Facility: CLINIC | Age: 65
End: 2020-06-09

## 2020-06-09 DIAGNOSIS — N31.9 NEUROGENIC BLADDER: Primary | ICD-10-CM

## 2020-06-09 DIAGNOSIS — Z01.818 PRE-OP EVALUATION: ICD-10-CM

## 2020-06-09 NOTE — TELEPHONE ENCOUNTER
----- Message from Joseph Gentile sent at 6/9/2020  2:07 PM CDT -----  Contact: self  [phone]   Patient called in regards to getting scheduled for the covid-19 drive through test order is in please reach out to patient and schedule, Thanks

## 2020-06-09 NOTE — TELEPHONE ENCOUNTER
----- Message from Nino Llamas sent at 6/9/2020  2:42 PM CDT -----  Contact: [facility] home nurse- Saray [phone]  Would like to receive medical advice.  Symptoms: Intense pain on L lower leg, pt can hardly move or walk     How long has the patient had these symptoms:  Yesterday    Would they like a call back or a response via [facility]:  Call back     Additional information:  Home health nurse said the pt winced in pain when she tried to examine it. Please call to advise.

## 2020-06-10 ENCOUNTER — LAB VISIT (OUTPATIENT)
Dept: INTERNAL MEDICINE | Facility: CLINIC | Age: 65
End: 2020-06-10
Payer: MEDICARE

## 2020-06-10 ENCOUNTER — TELEPHONE (OUTPATIENT)
Dept: UROLOGY | Facility: CLINIC | Age: 65
End: 2020-06-10

## 2020-06-10 DIAGNOSIS — Z01.818 PRE-OP EVALUATION: ICD-10-CM

## 2020-06-10 DIAGNOSIS — N31.9 NEUROGENIC BLADDER: ICD-10-CM

## 2020-06-10 PROCEDURE — U0003 INFECTIOUS AGENT DETECTION BY NUCLEIC ACID (DNA OR RNA); SEVERE ACUTE RESPIRATORY SYNDROME CORONAVIRUS 2 (SARS-COV-2) (CORONAVIRUS DISEASE [COVID-19]), AMPLIFIED PROBE TECHNIQUE, MAKING USE OF HIGH THROUGHPUT TECHNOLOGIES AS DESCRIBED BY CMS-2020-01-R: HCPCS | Mod: HCNC

## 2020-06-10 NOTE — TELEPHONE ENCOUNTER
Called [facility] ([place]) at   ph# [phone]. Gave verbal order for supra pubic catheter to be changed and urine specimen collected from new catheter to be sent for c&s on Monday, 6/15/2020.

## 2020-06-10 NOTE — TELEPHONE ENCOUNTER
----- Message from Shelley Kay MA sent at 6/9/2020  7:49 AM CDT -----  Surgery on 6/23. Needs home health orders for 6/15 for urine cx please.

## 2020-06-11 ENCOUNTER — DOCUMENT SCAN (OUTPATIENT)
Dept: HOME HEALTH SERVICES | Facility: HOSPITAL | Age: 65
End: 2020-06-11
Payer: MEDICAID

## 2020-06-11 LAB — SARS-COV-2 RNA RESP QL NAA+PROBE: NOT DETECTED

## 2020-06-12 ENCOUNTER — TELEPHONE (OUTPATIENT)
Dept: INTERNAL MEDICINE | Facility: CLINIC | Age: 65
End: 2020-06-12

## 2020-06-12 NOTE — TELEPHONE ENCOUNTER
----- Message from Starla Russell sent at 6/12/2020 11:01 AM CDT -----  Contact: self / [phone]  Pt states the dr was suppose to put in orders for some blood work to see whats going on with her. Pt states she went to the urgent care and they told her she has a staph infection in the blood stream. Pt states they put her on antibiotics but she will not take them. Please call and advise

## 2020-06-16 ENCOUNTER — TELEPHONE (OUTPATIENT)
Dept: CARDIOLOGY | Facility: CLINIC | Age: 65
End: 2020-06-16

## 2020-06-16 ENCOUNTER — PATIENT OUTREACH (OUTPATIENT)
Dept: ADMINISTRATIVE | Facility: HOSPITAL | Age: 65
End: 2020-06-16

## 2020-06-16 NOTE — PROGRESS NOTES
Health Maintenance Due   Topic Date Due    HIV Screening  08/25/1970    Colorectal Cancer Screening  06/03/2020     Chart review completed.

## 2020-06-16 NOTE — TELEPHONE ENCOUNTER
Please advise. Liz states that her swelling has increased, no redness, but has had significant weight gain, poor sleep, and migraines. Dr Rosado wanted her to take 2 lasix in the am and 1 in pm. She has not been taking the 1 in the p,m. She has not been taking her weight periodically as directed.  BP has been 92/62.       Received: Today  Message Contents   Alisson SANDOVAL Staff   Caller: Unspecified (Today, 10:37 AM)             Pls call Liz with [facility] [phone].   Pt has had a 14lb wt gain since the 9th and is very fatigued and has had a migraine for the past 2 days.  She is with the pt at this time.     Thank you

## 2020-06-17 ENCOUNTER — DOCUMENT SCAN (OUTPATIENT)
Dept: HOME HEALTH SERVICES | Facility: HOSPITAL | Age: 65
End: 2020-06-17
Payer: MEDICAID

## 2020-06-17 ENCOUNTER — TELEPHONE (OUTPATIENT)
Dept: UROLOGY | Facility: CLINIC | Age: 65
End: 2020-06-17

## 2020-06-17 DIAGNOSIS — Z01.818 PRE-OP EXAM: Primary | ICD-10-CM

## 2020-06-17 NOTE — ANESTHESIA PAT ROS NOTE
2020  Tasha Hawley is a 64 y.o., female.      Pre-op Assessment          Review of Systems         Anesthesia Assessment: Preoperative EQUATION    Planned Procedure: Procedure(s) (LRB):  CYSTOSCOPY,WITH BOTULINUM TOXIN INJECTION/ 200 units (N/A)  Requested Anesthesia Type:Monitor Anesthesia Care  Surgeon: Shireen Mayo MD  Service: Urology  Known or anticipated Date of Surgery:2020    Surgeon notes: reviewed    Electronic QUestionnaire Assessment completed via nurse interview with patient.        Triage considerations:         Previous anesthesia records:SKIP  10/31/19    Last PCP note: dr. ragland  20  Subspecialty notes: {subspecialty:89049}    Other important co-morbidities: {TRIAGE COMORBIDITIES:10786}      Tests already available:  {testin}             Instructions given. (See in Nurse's note)    Optimization:  Anesthesia Preop Clinic Assessment  Indicated    Medical Opinion Indicated       Sub-specialist consult indicated:   TBD       Plan:    Testing:  {tests:89001}   Pre-anesthesia  visit       Visit focus: {ane indications:89634}     Consultation:{consults:95661}     Patient  has previously scheduled Medical Appointment:    Navigation: Tests Scheduled.              Consults scheduled.             Results will be tracked by Preop Clinic.                 Straight Line to surgery.               No tests, anesthesia preop clinic visit, or consult required.

## 2020-06-21 ENCOUNTER — TELEPHONE (OUTPATIENT)
Dept: UROLOGY | Facility: CLINIC | Age: 65
End: 2020-06-21

## 2020-06-21 DIAGNOSIS — Z01.818 PRE-OP EXAM: ICD-10-CM

## 2020-06-21 DIAGNOSIS — R82.79 POSITIVE URINE CULTURE: Primary | ICD-10-CM

## 2020-06-21 RX ORDER — LEVOFLOXACIN 500 MG/1
500 TABLET, FILM COATED ORAL DAILY
Qty: 7 TABLET | Refills: 0 | Status: SHIPPED | OUTPATIENT
Start: 2020-06-21 | End: 2020-06-28

## 2020-06-21 NOTE — TELEPHONE ENCOUNTER
Sent in [facility] and spoke to the patient's .  The covid test order was not available on Friday.  Reordered if for tomorrow.

## 2020-06-22 ENCOUNTER — LAB VISIT (OUTPATIENT)
Dept: INTERNAL MEDICINE | Facility: CLINIC | Age: 65
End: 2020-06-22
Payer: MEDICARE

## 2020-06-22 ENCOUNTER — DOCUMENT SCAN (OUTPATIENT)
Dept: HOME HEALTH SERVICES | Facility: HOSPITAL | Age: 65
End: 2020-06-22
Payer: MEDICAID

## 2020-06-22 ENCOUNTER — CLINICAL SUPPORT (OUTPATIENT)
Dept: REHABILITATION | Facility: HOSPITAL | Age: 65
End: 2020-06-22
Attending: INTERNAL MEDICINE
Payer: MEDICARE

## 2020-06-22 ENCOUNTER — TELEPHONE (OUTPATIENT)
Dept: UROLOGY | Facility: CLINIC | Age: 65
End: 2020-06-22

## 2020-06-22 DIAGNOSIS — I89.0 LYMPHEDEMA OF BOTH LOWER EXTREMITIES: Chronic | ICD-10-CM

## 2020-06-22 DIAGNOSIS — M79.89 SWELLING OF BOTH LOWER EXTREMITIES: ICD-10-CM

## 2020-06-22 DIAGNOSIS — Z74.09 IMPAIRED FUNCTIONAL MOBILITY AND ACTIVITY TOLERANCE: ICD-10-CM

## 2020-06-22 DIAGNOSIS — Z01.818 PRE-OP EXAM: ICD-10-CM

## 2020-06-22 LAB — SARS-COV-2 RNA RESP QL NAA+PROBE: NOT DETECTED

## 2020-06-22 PROCEDURE — 97163 PT EVAL HIGH COMPLEX 45 MIN: CPT | Mod: HCNC,PO,59

## 2020-06-22 PROCEDURE — 97535 SELF CARE MNGMENT TRAINING: CPT | Mod: HCNC,PO

## 2020-06-22 PROCEDURE — 97140 MANUAL THERAPY 1/> REGIONS: CPT | Mod: HCNC,PO

## 2020-06-22 PROCEDURE — U0003 INFECTIOUS AGENT DETECTION BY NUCLEIC ACID (DNA OR RNA); SEVERE ACUTE RESPIRATORY SYNDROME CORONAVIRUS 2 (SARS-COV-2) (CORONAVIRUS DISEASE [COVID-19]), AMPLIFIED PROBE TECHNIQUE, MAKING USE OF HIGH THROUGHPUT TECHNOLOGIES AS DESCRIBED BY CMS-2020-01-R: HCPCS | Mod: HCNC

## 2020-06-22 NOTE — PLAN OF CARE
"[facility]  Physical Therapy Initial Evaluation    Name: Tasha Hawley  Clinic Number: [id number]    Therapy Diagnosis:   Encounter Diagnoses   Name Primary?    Lymphedema of both lower extremities     Swelling of both lower extremities     Impaired functional mobility and activity tolerance      Physician: Kalpesh Haney MD*    Physician Orders: PT Eval and Treat lymphedema  Medical Diagnosis from Referral:   Diagnosis   I89.0 (ICD-10-CM) - Lymphedema of both lower extremities       Evaluation Date: 6/22/2020  Authorization Period Expiration: 12/22/20  Plan of Care Expiration: 9/14/20  Visit # / Visits authorized: 1/ 30    Time In: 950a  Time Out: 1110a  Total Billable Time: 80 minutes    Precautions: Standard, Fall, CHF and 12 spinal surgeries, pain pump, suprapubic catheter    Subjective   Date of onset: hurt in 1997- fell off stock roof at [facility]- stood on a metal cart, cart moved and fell on lower spine- multiple procedures for back/spine - 12 surgeries- swelling in both legs since surgeries, more pain in L leg.  "today not a good day"  History of current condition - Tasha reports: has meadows catheter - subrapubic catheter- tomorrow having Botox for bladder and kidneys  Fluid pills to manage swelling- Dr. Haney says heart condition, cardiologist doesn't always says she does  With lying down swelling improves  Wore compression stockings in past - 'not wearing- made leg swell more, too tight, uncomfortable"   Takes fluid pill- 2 in morning and 1 at night- must check weight  HH nurse manages catheter and fluid   is interested in Veclro socks for compression  Admits CHF, bladder issues-   Denies HTN- states Low BP, kidney failure, DM, no cancer.     Medical History:   Past Medical History:   Diagnosis Date    Anticoagulant long-term use     Anxiety     Arthritis     Bilateral lower extremity edema     severe chronic    Carotid artery occlusion     Cataract  "    Coronary artery disease     subtotalled LAD with collateral    Depression     Fever blister     Hypothyroid     Iron deficiency anemia     Lumbar radiculopathy     with chronic pain    Ocular migraine     Sleep apnea     cpap       Surgical History:   Tasha Hawley  has a past surgical history that includes Hysterectomy (1975); Back surgery; pain pump placement; Spine surgery (5-13-13); Cataract extraction w/  intraocular lens implant (Left); Spinal cord stimulator removal; Esophagogastroduodenoscopy (N/A, 5/23/2018); Tonsillectomy; Adenoidectomy; Cardiac catheterization (2016); Cystoscopic litholapaxy (N/A, 6/27/2019); Cystoscopic litholapaxy (N/A, 9/3/2019); and Replacement of catheter (N/A, 10/31/2019).    Medications:   Tasha has a current medication list which includes the following prescription(s): ascorbic acid (vitamin c), aspirin, atorvastatin, butalbital-acetaminophen-caffeine -40 mg, fluoxetine, furosemide, gabapentin, hydrocodone-acetaminophen, intrathecal pain pump compound, levofloxacin, levothyroxine, oxybutynin, pantoprazole, polyethylene glycol, potassium chloride, promethazine, quetiapine, senna, trazodone, and lamotrigine.    Allergies:   Review of patient's allergies indicates:   Allergen Reactions    (d)-limonene flavor      Other reaction(s): difficult intubation  Other reaction(s): Difficulty breathing    Bactrim [sulfamethoxazole-trimethoprim] Anaphylaxis    Benadryl [diphenhydramine hcl] Shortness Of Breath    Imitrex [sumatriptan succinate] Shortness Of Breath    Topamax [topiramate] Shortness Of Breath    Vancomycin Shortness Of Breath     Rash    Butorphanol tartrate     Darvocet a500 [propoxyphene n-acetaminophen]      Other reaction(s): Difficulty breathing    Fentanyl      Other reaction(s): Vomiting  Other reaction(s): Nausea  Other reaction(s): Itching  swelling    White petrolatum-zinc     Zinc oxide-white petrolatum      Other reaction(s):  Difficulty breathing    Latex, natural rubber Itching and Rash    Phenytoin Rash and Other (See Comments)     Trouble breathing        Imaging  Impression:   No evidence of deep venous thrombosis in either lower extremity.   Electronically signed by: Tomer Garzon MD  Date:                                            01/30/2020    Per 6/2/20 Dr. Haney:  At follow up clinic visit on 3/12/2020, Mrs. Hawley reported BLE edema has improved over the past week.  Continues to have significant pain in both legs.  Unfortunately, pt was unable to make it to appointment for BLE venous reflux study, and has not been evaluated in the lymphedema clinic.  BLE Arterial Ultrasound on 3/10/2020 showed no evidence of hemodynamically significant infrainguinal PAD bilaterally.  Tri- and biphasic waveforms throughout.  Plan: BLE Lymphedema with Severe TTP- Check BLE venous reflux study.  Refer to lymphedema clinic.  Pt to elevate legs when resting. Continue torsemide as prescribed by PCP-Dr. Hodges (pt has close follow up with PCP).   Pt is awaiting Neuro evaluation for leg pain.   HPI:Mrs. Hawley reports some improvement in BLE edema.  Pt not yet evaluated in lymphedema clinic due to previous COVID precautions.   Exam shows BLE edema consistent with lymphedema with TTP at both shins.     Surgery: states 12 surgeries for back/spine, pain pump, suprapubic catheter  Previous Lymphedema Treatment: has home pump- has not used in over 1 year, not sure of brand or who issued  Notes wound care / compression bandaging in past with podiatry  No recent compression  Prior Therapy: yes    Social History: lives with   Not able to drive  Hard of hearing-  provides transportation  Repeat COVID test for procedure tomorrow  Limited by back pain  Environmental barriers: ramp access , wc for clinic, walks with Rollator- x 2  Limited walking distances- back pain and swelling  Level home  Help with shoes/socks  Side of tub - for  baths   Abuse/Neglect: no   Nutritional status: overweight   Educational needs: met with     Spiritual/Cultural: met   Fall risk: loss of balance, legs buckle- 4-5x / month- fell out of bed recently   Occupation: not working  Prior Level of Function: limited since surgeries  Current Level of Function: very limited - doesn't sleep well  Gait: rollator household   Transfers:mod I   Bed Mobility: mod I     Pain: has pain pump - pain all the time  Dilaudid pump   Current 8/10, worst 10/10, best 6/10   Location: bilateral B LE L >R   Description: Burning and heavy- red and thick  Aggravating Factors: Sitting, Standing, Walking and not sleeping  Easing Factors: lying down, rest, elevation and overnight better    Pts goals: basically walk, advised of goals for swelling and need for compression    Objective     Sickly appearing female- to dept via wc with  to assist  Supra pubic catheter with attachment along R thigh for bag  States mid abdomin to pubic access  Has spinal pain pump at R flank  COVID precautions with mask- pt hard of hearing and  assists  Amount of Swelling: Location of Swelling: full abdominal region of body habitus/obesity  Full thighs with obesity and edema  Lower legs with mod fullness, pink coloring and tenderness to touch  Mild/mod ankles- less in feet - Stemmer signs B  Dark thick fungal changes to nails   Hammer toes B. Thick callous mid foot with fallen arch and bony prominence  Wearing tennis shoes, basic socks to mid leg, no compression   Skin Integrity: scattered spider veins, dry heels, thick nails, small scrape L Le   Palpation/Texture: min pitting in lower legs- very tender to LE, full thighs   Circulation: cool feet, pulses palpable LE     Posture: very labored mobility, transfers with MIN A from Wc- never fully extends spine- uses UE for support  Changes in feet and toes as above     Range of Motion - UE/LE  (R) limited by pain- HOB elevated for back complaints  Knee  arom supine to 95 due to back pain, DF 0   (L) limited by pain- HOB elevated  Knee arom supine to 75 due to knee and back pain, DF -5     Strength: not formally assessed  Uses UE to assist LE motions with wc and bed mobility  Sit to stand with UE assist     Sensation: intact to lt touch- sensitive and paraesthesias reported B LE    Girth Measurements (in centimeters)  LANDMARK LEFT LE  6/22/20 RIGHT LE  6/22/20 DIFF   at eval   SBP + 20 - cm - cm - cm   SBP + 10  60.0 cm 61.0 cm 1.0 cm   SBP 53.0 cm 51.5 cm 1.5 cm   10 below SBP 44.0 cm 43.0 cm 1.0 cm   20 below SBP 40.0 cm 41.0 cm 1.0 cm   30 below SBP 32.5 cm 32.5 cm 0 cm   35 below SBP 30.0 cm 30.0 cm 0 cm   Ankle 26.5 cm 27.0 cm 0.5 cm   Forefoot 21.5 cm 20.5 cm 1.0 cm       CMS Impairment/Limitation/Restriction for FOTO staff did not capture       TREATMENT   Treatment Time In: 1040a  Treatment Time Out: 1110a  Total Treatment time separate from Evaluation: 30 minutes    Tasha received therapeutic exercises to develop ROM and muscle pump assist for 5  minutes including:  Aps, knee arom, change in position- avoid dependency and immobility    Tasha received the following manual therapy techniques: Manual Lymphatic Drainage of compression needs and tubigrip were applied to the: B LE  for 10 minutes, including:  tubigrip E with double wrap to foot/ankle to knee- had  don/doff, needs assistance, socks and tennis shoes  Demo of use and compression needs  Wear daily with removal as needed if painful or ill fitting  Use daily    Pump review- will need medical clearance  Has home pump- unclear of brand or settings- hasn't used in > 1 year  Demo of MLD and bandaging    Home Exercises and Patient Education Provided    Education provided- self care training/home management x 15:   - Pt was educated in potential compression needs.  Demo of products including socks, garments, and Inelastic Velcro wraps.   Discussed cost/coverage and authorization per insurance with  Durable Medical Equipment(DME) provider.  Compression needs may require orders from referring provider and coverage or purchase of products from DME or self order.      Discussed wear schedule, don/doff, wash and management of products.  Size and compression class and AM/PM needs.    Demo of Manual Lymphatic Drainage and short stretch compression bandaging.   Consideration for tubigrip as form of temporary compression use until securing compression needs.   Vendor list provided.    Informed insurance coverage of compression is per DME provider and typically Medicare and Medicare group plans may not cover cost beyond pair of standard sized knee high garments.   Commitment to attendance as well as commitment to securing compression needs is critical to edema management.     - Pt was educated in lymphedema etiology and management plans.  Pt was provided with written  risk reductions and precautions for managing lymphedema.      Demo of products- knee high and inelastic velcro wraps  Can not wear shorts due to catheter  Limited for thigh highs- catheter support and shape    Noted limitations per back pain and mobility- noted obstacles to management and need for compression commitment    This patient is in agreement to participate in Lymphedema treatment.    Written Home Exercises Provided: Patient instructed to cont prior HEP.  Exercises were reviewed and Tasha was able to demonstrate them prior to the end of the session.  Tasha demonstrated fair  understanding of the education provided.     See EMR under Patient Instructions for exercises provided 6/22/2020.    Assessment   Tasha is a 64 y.o. female referred to outpatient Physical Therapy with a medical diagnosis of   Diagnosis   I89.0 (ICD-10-CM) - Lymphedema of both lower extremities   This patient presents s/p multiple surgical procedures for spine/back pain and pain management, limited ,mobility and function per chronic pain and disability, pain pump placement,  obesity, immobility, dependency all contributing to : lymphedema of the B LE, increased pain, increased stiffness in the back, LE, as well as difficulty performing functional mobility, compression needs, walking, transitions, self care , and placing the pt at higher risk of infection.   Severity and chronicity of LE coupled with multiple medical co morbidities- chronic pain, surgical management,  as well as morbid obesity will limit successful management of this condition unless other areas are addressed or minimized. Compression options are limited by size, shape, fit and wear tolerance as well as possible need for custom sizing with related cost considerations. .   Therapy will not be successful unless medical issues, pain, and obesity are addressed. Limited expectations and chronic nature.   Therapy may assist with education and training regarding need for compression support. Then compliance with purchase of compression needs and compliance with daily use are required.    Pt prognosis is Fair.   Pt will benefit from skilled outpatient Physical Therapy to address the deficits stated above and in the chart below, provide pt/family education, and to maximize pt's level of independence.     Plan of care discussed with patient: Yes with   Pt's spiritual, cultural and educational needs considered and patient is agreeable to the plan of care and goals as stated below:     Anticipated Barriers for therapy: chronic pain, compliance, tolerance to compression, catheter, immobility    Medical Necessity is demonstrated by the following  History  Co-morbidities and personal factors that may impact the plan of care Co-morbidities:   advanced age, CHF, coping style/mechanism, difficulty sleeping, excessive commute time/distance, financial considerations, high BMI, level of undertstanding of current condition, poor medication/medical compliance, prior lumbar surgery, prior pelvic surgery, transportation assistance  required and CVI, lymphededema, pain    Personal Factors:   age     high   Examination  Body Structures and Functions, activity limitations and participation restrictions that may impact the plan of care Body Regions:   lower extremities  trunk    Body Systems:    ROM  strength  gait  transitions  edema  scar formation  skin integrity  skin color  CVI, pain pump, catheter, lymphedema    Participation Restrictions:    to assist  Pain  Procedures  Past compliance with compression    Activity limitations:   Learning and applying knowledge  no deficits    General Tasks and Commands  no deficits    Communication  hard of hearing    Mobility  lifting and carrying objects  walking  moving around using equipment (WC)  using transportation (bus, train, plane, car)  driving (bike, car, motorcycle)    Self care  washing oneself (bathing, drying, washing hands)  dressing  looking after one's health    Domestic Life  shopping  cooking  doing house work (cleaning house, washing dishes, laundry)  assisting others    Interactions/Relationships  no deficits    Life Areas  no deficits    Community and Social Life  community life  recreation and leisure         high   Clinical Presentation unstable clinical presentation with unpredictable characteristics high   Decision Making/ Complexity Score: high     The following goals were discussed with the patient and patient is in agreement with them as to be addressed in the treatment plan.     Short Term Goals: (6 weeks)  * all goals with  to assist  1. Patient will show decreased girth in B LE by up to 2 cm to allow for LE symmetry, shoe and clothing choice, and ability to apply needed compression.  (progressing, not met)   2. Patient will demonstrate 100% knowledge of lymphedema precautions and signs of infection to allow for reduced lymphedema risk, infection risk, and/or exacerbation of condition.  (progressing, not met)  3. Patient or caregiver will perform self-bandaging  techniques and/or wearing of compression garments to allow for lymphatic drainage support, skin elasticity, and reduction in shape and size of limb. (progressing, not met)  4. Patient will perform self lymph drainage techniques to areas within reach to enhance lymphatic drainage and skin condition.  (progressing, not met)  5. Patient will tolerate daily activities with multilayered bandaging to allow for lymphatic and venous support.  (progressing, not met)    Long Term Goals: (12  Weeks)  * all goals with  to assist  1. Patient will show decreased girth in B LE by up to 3 cm  to allow for LE symmetry, shoe and clothing choice, and ability to apply needed compression daily.  (progressing, not met)  2. Patient will show reduction in density to mild or less with improved contour of limb to allow for cosmesis, LE symmetry, infection risk reduction, and clothing and compression choice.   (progressing, not met)  3. Patient to vernell/doff compression garment with daily compliance to assist in lymphedema management, skin elasticity, and tissue density.  (progressing, not met)  4. Pt to show improved postural awareness and alignment.  (progressing, not met)  5. Pt to be I and compliant with HEP to allow for increased function in affected limb.   (progressing, not met)      Plan   Plan of care Certification: 6/22/2020 to 9/14/20.    Outpatient Physical Therapy 2 times weekly for 10 weeks to include the following interventions: Gait Training, Manual Therapy, Neuromuscular Re-ed, Patient Education, Self Care and Therapeutic Exercise. Complete Decongestive Therapy- compression and home equipment needs to be addressed and assisted.    Pt may be seen by a PTA as part of the Rehab treatment team.    Tracey Bunch, PT

## 2020-06-22 NOTE — TELEPHONE ENCOUNTER
Called pt to confirm arrival time of 7am for procedure on 6/23/2020. Gave pt NPO instructions and gave pt opportunity to ask questions. Pt verbalized understanding.

## 2020-06-23 ENCOUNTER — HOSPITAL ENCOUNTER (OUTPATIENT)
Facility: HOSPITAL | Age: 65
Discharge: HOME | End: 2020-06-23
Attending: UROLOGY | Admitting: UROLOGY
Payer: MEDICARE

## 2020-06-23 ENCOUNTER — ANESTHESIA EVENT (OUTPATIENT)
Dept: SURGERY | Facility: HOSPITAL | Age: 65
End: 2020-06-23
Payer: MEDICARE

## 2020-06-23 ENCOUNTER — ANESTHESIA (OUTPATIENT)
Dept: SURGERY | Facility: HOSPITAL | Age: 65
End: 2020-06-23
Payer: MEDICARE

## 2020-06-23 VITALS
TEMPERATURE: 98 F | HEIGHT: 64 IN | DIASTOLIC BLOOD PRESSURE: 66 MMHG | SYSTOLIC BLOOD PRESSURE: 127 MMHG | BODY MASS INDEX: 31.24 KG/M2 | HEART RATE: 57 BPM | RESPIRATION RATE: 16 BRPM | WEIGHT: 183 LBS | OXYGEN SATURATION: 98 %

## 2020-06-23 DIAGNOSIS — N31.9 NEUROGENIC BLADDER: ICD-10-CM

## 2020-06-23 PROCEDURE — 51705 PR CHANGE OF BLADDER TUBE,SIMPLE: ICD-10-PCS | Mod: 51,HCNC,, | Performed by: UROLOGY

## 2020-06-23 PROCEDURE — 25000003 PHARM REV CODE 250: Mod: HCNC | Performed by: STUDENT IN AN ORGANIZED HEALTH CARE EDUCATION/TRAINING PROGRAM

## 2020-06-23 PROCEDURE — 71000045 HC DOSC ROUTINE RECOVERY EA ADD'L HR: Mod: HCNC | Performed by: UROLOGY

## 2020-06-23 PROCEDURE — 51705 CHANGE OF BLADDER TUBE: CPT | Mod: 51,HCNC,, | Performed by: UROLOGY

## 2020-06-23 PROCEDURE — D9220A PRA ANESTHESIA: Mod: HCNC,CRNA,, | Performed by: NURSE ANESTHETIST, CERTIFIED REGISTERED

## 2020-06-23 PROCEDURE — D9220A PRA ANESTHESIA: Mod: HCNC,ANES,, | Performed by: ANESTHESIOLOGY

## 2020-06-23 PROCEDURE — 52287 CYSTOSCOPY CHEMODENERVATION: CPT | Mod: HCNC,,, | Performed by: UROLOGY

## 2020-06-23 PROCEDURE — 36000707: Mod: HCNC | Performed by: UROLOGY

## 2020-06-23 PROCEDURE — 71000033 HC RECOVERY, INTIAL HOUR: Mod: HCNC | Performed by: UROLOGY

## 2020-06-23 PROCEDURE — 25000003 PHARM REV CODE 250: Mod: HCNC | Performed by: NURSE ANESTHETIST, CERTIFIED REGISTERED

## 2020-06-23 PROCEDURE — 63600175 PHARM REV CODE 636 W HCPCS: Mod: HCNC | Performed by: NURSE ANESTHETIST, CERTIFIED REGISTERED

## 2020-06-23 PROCEDURE — D9220A PRA ANESTHESIA: ICD-10-PCS | Mod: HCNC,ANES,, | Performed by: ANESTHESIOLOGY

## 2020-06-23 PROCEDURE — 63600175 PHARM REV CODE 636 W HCPCS: Mod: HCNC | Performed by: STUDENT IN AN ORGANIZED HEALTH CARE EDUCATION/TRAINING PROGRAM

## 2020-06-23 PROCEDURE — 71000015 HC POSTOP RECOV 1ST HR: Mod: HCNC | Performed by: UROLOGY

## 2020-06-23 PROCEDURE — 37000009 HC ANESTHESIA EA ADD 15 MINS: Mod: HCNC | Performed by: UROLOGY

## 2020-06-23 PROCEDURE — 52287 PR CYSTOURETHROSCOPY WITH INJ FOR CHEMODENERVATION: ICD-10-PCS | Mod: HCNC,,, | Performed by: UROLOGY

## 2020-06-23 PROCEDURE — 37000008 HC ANESTHESIA 1ST 15 MINUTES: Mod: HCNC | Performed by: UROLOGY

## 2020-06-23 PROCEDURE — 71000044 HC DOSC ROUTINE RECOVERY FIRST HOUR: Mod: HCNC | Performed by: UROLOGY

## 2020-06-23 PROCEDURE — 36000706: Mod: HCNC | Performed by: UROLOGY

## 2020-06-23 PROCEDURE — 71000039 HC RECOVERY, EACH ADD'L HOUR: Mod: HCNC | Performed by: UROLOGY

## 2020-06-23 PROCEDURE — D9220A PRA ANESTHESIA: ICD-10-PCS | Mod: HCNC,CRNA,, | Performed by: NURSE ANESTHETIST, CERTIFIED REGISTERED

## 2020-06-23 RX ORDER — PROPOFOL 10 MG/ML
VIAL (ML) INTRAVENOUS
Status: DISCONTINUED | OUTPATIENT
Start: 2020-06-23 | End: 2020-06-23

## 2020-06-23 RX ORDER — PHENAZOPYRIDINE HYDROCHLORIDE 100 MG/1
100 TABLET, FILM COATED ORAL 3 TIMES DAILY PRN
Qty: 9 TABLET | Refills: 0 | Status: SHIPPED | OUTPATIENT
Start: 2020-06-23 | End: 2020-06-26

## 2020-06-23 RX ORDER — KETAMINE HCL IN 0.9 % NACL 50 MG/5 ML
SYRINGE (ML) INTRAVENOUS
Status: DISCONTINUED | OUTPATIENT
Start: 2020-06-23 | End: 2020-06-23

## 2020-06-23 RX ORDER — GLYCOPYRROLATE 0.2 MG/ML
INJECTION INTRAMUSCULAR; INTRAVENOUS
Status: DISCONTINUED | OUTPATIENT
Start: 2020-06-23 | End: 2020-06-23

## 2020-06-23 RX ORDER — PROPOFOL 10 MG/ML
VIAL (ML) INTRAVENOUS CONTINUOUS PRN
Status: DISCONTINUED | OUTPATIENT
Start: 2020-06-23 | End: 2020-06-23

## 2020-06-23 RX ORDER — LIDOCAINE HYDROCHLORIDE 20 MG/ML
INJECTION INTRAVENOUS
Status: DISCONTINUED | OUTPATIENT
Start: 2020-06-23 | End: 2020-06-23

## 2020-06-23 RX ORDER — ONDANSETRON 2 MG/ML
4 INJECTION INTRAMUSCULAR; INTRAVENOUS DAILY PRN
Status: DISCONTINUED | OUTPATIENT
Start: 2020-06-23 | End: 2020-06-23 | Stop reason: HOSPADM

## 2020-06-23 RX ORDER — SODIUM CHLORIDE 9 MG/ML
INJECTION, SOLUTION INTRAVENOUS CONTINUOUS
Status: DISCONTINUED | OUTPATIENT
Start: 2020-06-23 | End: 2020-06-23 | Stop reason: HOSPADM

## 2020-06-23 RX ORDER — HYDROMORPHONE HYDROCHLORIDE 1 MG/ML
INJECTION, SOLUTION INTRAMUSCULAR; INTRAVENOUS; SUBCUTANEOUS
Status: DISCONTINUED
Start: 2020-06-23 | End: 2020-06-23 | Stop reason: HOSPADM

## 2020-06-23 RX ORDER — FLUCONAZOLE 2 MG/ML
400 INJECTION, SOLUTION INTRAVENOUS
Status: COMPLETED | OUTPATIENT
Start: 2020-06-23 | End: 2020-06-23

## 2020-06-23 RX ORDER — HYDROMORPHONE HYDROCHLORIDE 2 MG/ML
INJECTION, SOLUTION INTRAMUSCULAR; INTRAVENOUS; SUBCUTANEOUS
Status: DISCONTINUED | OUTPATIENT
Start: 2020-06-23 | End: 2020-06-23

## 2020-06-23 RX ORDER — MIDAZOLAM HYDROCHLORIDE 1 MG/ML
INJECTION, SOLUTION INTRAMUSCULAR; INTRAVENOUS
Status: DISCONTINUED | OUTPATIENT
Start: 2020-06-23 | End: 2020-06-23

## 2020-06-23 RX ADMIN — FLUCONAZOLE 400 MG: 2 INJECTION INTRAVENOUS at 09:06

## 2020-06-23 RX ADMIN — GENTAMICIN SULFATE 240 MG: 40 INJECTION, SOLUTION INTRAMUSCULAR; INTRAVENOUS at 09:06

## 2020-06-23 RX ADMIN — HYDROMORPHONE HYDROCHLORIDE 0.2 MG: 2 INJECTION, SOLUTION INTRAMUSCULAR; INTRAVENOUS; SUBCUTANEOUS at 09:06

## 2020-06-23 RX ADMIN — GLYCOPYRROLATE 0.2 MG: 0.2 INJECTION, SOLUTION INTRAMUSCULAR; INTRAVENOUS at 09:06

## 2020-06-23 RX ADMIN — PROPOFOL 125 MCG/KG/MIN: 10 INJECTION, EMULSION INTRAVENOUS at 09:06

## 2020-06-23 RX ADMIN — PROPOFOL 50 MG: 10 INJECTION, EMULSION INTRAVENOUS at 09:06

## 2020-06-23 RX ADMIN — AMPICILLIN SODIUM 1 G: 1 INJECTION, POWDER, FOR SOLUTION INTRAMUSCULAR; INTRAVENOUS at 09:06

## 2020-06-23 RX ADMIN — Medication 20 MG: at 09:06

## 2020-06-23 RX ADMIN — MIDAZOLAM HYDROCHLORIDE 2 MG: 1 INJECTION, SOLUTION INTRAMUSCULAR; INTRAVENOUS at 09:06

## 2020-06-23 RX ADMIN — LIDOCAINE HYDROCHLORIDE 100 MG: 20 INJECTION, SOLUTION INTRAVENOUS at 09:06

## 2020-06-23 RX ADMIN — SODIUM CHLORIDE: 0.9 INJECTION, SOLUTION INTRAVENOUS at 09:06

## 2020-06-23 NOTE — PLAN OF CARE
Discharge instructions given to and explained to patient and her . All questions answered and pt and family member verbalized understanding. IV discontinued with cannula intact. Vital signs stable and pt AOx4. The pt refused to get the medication filled here, since our pharmacy didn't have enough medications to fill the prescription. Pt verbalized understanding to have her pharmacy call ours for the prescription if she decides she wants it.

## 2020-06-23 NOTE — ANESTHESIA PREPROCEDURE EVALUATION
06/23/2020  Tasha Hawley is a 64 y.o., female.  Pre-operative evaluation for Procedure(s) (LRB):  CYSTOSCOPY,WITH BOTULINUM TOXIN INJECTION/ 200 units (N/A)    Tasha Hawley is a 64 y.o. female     Patient Active Problem List   Diagnosis    Generalized anxiety disorder    Iron deficiency anemia    Major depressive disorder, recurrent, mild    Chronic pain syndrome    Cervical myelopathy    Narcotic dependency, continuous    Thoracic aorta atherosclerosis    Drug-induced constipation    GERD (gastroesophageal reflux disease)    Coronary artery disease involving native coronary artery of native heart without angina pectoris    Neurogenic bladder    Urinary retention    Frequent falls    Weight loss, unintentional    Primary hypothyroidism    Lymphedema of both lower extremities    Scoliosis deformity of spine    S/P insertion of spinal cord stimulator    S/P insertion of intrathecal pump    Paresthesia of both lower extremities    Degenerative disc disease, lumbar    Osteoarthritis of spine with radiculopathy, lumbar region    Facet arthropathy, lumbar    Bilateral carotid artery disease    Insomnia due to medical condition    Suprapubic catheter    Ischemic cardiomyopathy    Esophageal dysphagia    Recurrent UTI (urinary tract infection)    Dyspnea    Costochondritis    Mixed stress and urge urinary incontinence    Chronic venous insufficiency    Inflammatory spondylopathy of lumbar region    Pure hypercholesterolemia    Chronic diastolic heart failure    Left lower quadrant abdominal pain    Partial small bowel obstruction    Bradycardia, sinus    Constipation due to opioid therapy    Left sided abdominal pain    Pain in both lower legs    Urinary tract infection associated with cystostomy catheter    Hypotension    Swelling of both lower extremities     Impaired functional mobility and activity tolerance       Review of patient's allergies indicates:   Allergen Reactions    (d)-limonene flavor      Other reaction(s): difficult intubation  Other reaction(s): Difficulty breathing    Bactrim [sulfamethoxazole-trimethoprim] Anaphylaxis    Benadryl [diphenhydramine hcl] Shortness Of Breath    Imitrex [sumatriptan succinate] Shortness Of Breath    Topamax [topiramate] Shortness Of Breath    Vancomycin Shortness Of Breath     Rash    Butorphanol tartrate     Darvocet a500 [propoxyphene n-acetaminophen]      Other reaction(s): Difficulty breathing    Fentanyl      Other reaction(s): Vomiting  Other reaction(s): Nausea  Other reaction(s): Itching  swelling    White petrolatum-zinc     Zinc oxide-white petrolatum      Other reaction(s): Difficulty breathing    Latex, natural rubber Itching and Rash    Phenytoin Rash and Other (See Comments)     Trouble breathing       No current facility-administered medications on file prior to encounter.      Current Outpatient Medications on File Prior to Encounter   Medication Sig Dispense Refill    aspirin (ECOTRIN) 81 MG EC tablet Take 1 tablet (81 mg total) by mouth once daily.  0    atorvastatin (LIPITOR) 80 MG tablet TAKE 1 TABLET BY MOUTH DAILY 90 tablet 3    butalbital-acetaminophen-caffeine -40 mg (FIORICET, ESGIC) -40 mg per tablet Take 1 tablet by mouth every 4 (four) hours as needed for Headaches.       FLUoxetine 20 MG capsule Take 3 capsules (60 mg total) by mouth once daily. 270 capsule 1    furosemide (LASIX) 40 MG tablet Take 2 tablets every morning and one every evening 270 tablet 3    gabapentin (NEURONTIN) 100 MG capsule Take 1 capsule (100 mg total) by mouth 3 (three) times daily. 90 capsule 2    HYDROcodone-acetaminophen (NORCO)  mg per tablet Take 1 tablet by mouth every 6 (six) hours as needed for Pain. 15 tablet 0    levothyroxine (SYNTHROID) 125 MCG tablet Take 1 tablet (125  mcg total) by mouth before breakfast. 90 tablet 3    oxybutynin (DITROPAN XL) 15 MG TR24 Take 1 tablet (15 mg total) by mouth once daily. 90 tablet 3    pantoprazole (PROTONIX) 40 MG tablet Take 1 tablet (40 mg total) by mouth once daily. 90 tablet 3    polyethylene glycol (GLYCOLAX) 17 gram PwPk Take 17 g by mouth once daily. 30 each 2    potassium chloride (MICRO-K) 10 MEQ CpSR Take 2 capsules (20 mEq total) by mouth once daily. for 90 doses 180 capsule 3    promethazine (PHENERGAN) 25 MG tablet Take 25 mg by mouth every 6 (six) hours as needed for Nausea.      QUEtiapine (SEROQUEL) 100 MG Tab TAKE 1 TABLET (100 MG TOTAL) BY MOUTH EVERY EVENING. 30 tablet 0    senna (SENNA LAX) 8.6 mg tablet Take 2 tablets by mouth 2 (two) times daily.      traZODone (DESYREL) 100 MG tablet Take 3 tablets (300 mg total) by mouth every evening. 90 tablet 0    ascorbic acid, vitamin C, (VITAMIN C) 500 MG tablet Take 500 mg by mouth once daily.      intrathecal pain pump compound Hydromorphone (7.5 mg/mL) infusion at 3.6799 mg/day (0.1533 mg/hr) out of a total reservoir volume of 37.3 mL  Pump filled every 2 months      [DISCONTINUED] lamotrigine (LAMICTAL) 25 MG tablet Take 1 tablet (25 mg total) by mouth once daily. 30 tablet 6       Past Surgical History:   Procedure Laterality Date    ADENOIDECTOMY      BACK SURGERY      x 12    CARDIAC CATHETERIZATION  2016    subtotalled LAD with right to left collaterals    CATARACT EXTRACTION W/  INTRAOCULAR LENS IMPLANT Left     Dr Coleman     CYSTOSCOPIC LITHOLAPAXY N/A 6/27/2019    Procedure: CYSTOLITHOLAPAXY;  Surgeon: Shireen Mayo MD;  Location: [facility] OR [address] [place] [address];  Service: Urology;  Laterality: N/A;    CYSTOSCOPIC LITHOLAPAXY N/A 9/3/2019    Procedure: CYSTOLITHOLAPAXY;  Surgeon: Shireen Mayo MD;  [facility];  Service: Urology;  Laterality: N/A;    ESOPHAGOGASTRODUODENOSCOPY N/A 5/23/2018    Procedure: ESOPHAGOGASTRODUODENOSCOPY (EGD);  Surgeon:  Prince Vance MD;  Location: [facility] ENDO (4TH FLR);  Service: Endoscopy;  Laterality: N/A;  r/s 'd per Dr. Vance due to family emergency- ER    HYSTERECTOMY  1975    endometriosis    pain pump placement      SQ Dilaudid Pump managed by Dr. Hillman, Pain Management    REPLACEMENT OF CATHETER N/A 10/31/2019    Procedure: REPLACEMENT, CATHETER-SUPRAPUBIC;  Surgeon: Shireen Mayo MD;  Location: [facility] OR 1ST FLR;  Service: Urology;  Laterality: N/A;    SPINAL CORD STIMULATOR REMOVAL      before [place]    SPINE SURGERY  5-13-13    CERVICAL FUSION    TONSILLECTOMY         Social History     Socioeconomic History    Marital status:      Spouse name: Not on file    Number of children: Not on file    Years of education: Not on file    Highest education level: Not on file   Occupational History    Not on file   Social Needs    Financial resource strain: Not on file    Food insecurity     Worry: Not on file     Inability: Not on file    Transportation needs     Medical: Not on file     Non-medical: Not on file   Tobacco Use    Smoking status: Never Smoker    Smokeless tobacco: Never Used   Substance and Sexual Activity    Alcohol use: Never     Frequency: Never    Drug use: No    Sexual activity: Never     Partners: Male   Lifestyle    Physical activity     Days per week: Not on file     Minutes per session: Not on file    Stress: Not on file   Relationships    Social connections     Talks on phone: Not on file     Gets together: Not on file     Attends [employer] service: Not on file     Active member of club or organization: Not on file     Attends meetings of clubs or organizations: Not on file     Relationship status: Not on file   Other Topics Concern    Are you pregnant or think you may be? Not Asked    Breast-feeding Not Asked   Social History Narrative    Not on file           2D Echo:  Results for orders placed or performed during the hospital encounter of 08/20/17   2D echo with  color flow doppler   Result Value Ref Range    QEF 65 55 - 65    Diastolic Dysfunction Yes (A)     Pericardial Effusion NONE          Anesthesia Evaluation    I have reviewed the Patient Summary Reports.    I have reviewed the Nursing Notes.    I have reviewed the Medications.     Review of Systems  Anesthesia Hx:  No problems with previous Anesthesia  History of prior surgery of interest to airway management or planning: Denies Family Hx of Anesthesia complications.   Denies Personal Hx of Anesthesia complications.   Social:  Non-Smoker    Hematology/Oncology:  Hematology Normal   Oncology Normal     EENT/Dental:EENT/Dental Normal   Cardiovascular:  Cardiovascular Normal CAD       Pulmonary:   Shortness of breath Sleep Apnea    Renal/:  Renal/ Normal     Hepatic/GI:   GERD    Musculoskeletal:   Arthritis     Neurological:  Neurology Normal    Endocrine:   Hypothyroidism    Psych:   Psychiatric History anxiety          Physical Exam  General:  Well nourished    Airway/Jaw/Neck:  Airway Findings: Mouth Opening: Normal Tongue: Normal  General Airway Assessment: Adult  Mallampati: II  TM Distance: Normal, at least 6 cm  Jaw/Neck Findings:  Neck ROM: Normal ROM      Dental:  Dental Findings: In tact   Chest/Lungs:  Chest/Lungs Findings: Clear to auscultation, Normal Respiratory Rate     Heart/Vascular:  Heart Findings: Rate: Normal  Rhythm: Regular Rhythm  Sounds: Normal        Mental Status:  Mental Status Findings:  Cooperative, Alert and Oriented, Flat Affect         Anesthesia Plan  Type of Anesthesia, risks & benefits discussed:  Anesthesia Type:  general  Patient's Preference:   Intra-op Monitoring Plan: standard ASA monitors  Intra-op Monitoring Plan Comments:   Post Op Pain Control Plan: multimodal analgesia  Post Op Pain Control Plan Comments:   Induction:   IV  Beta Blocker:  Patient is not currently on a Beta-Blocker (No further documentation required).       Informed Consent: Patient understands risks  and agrees with Anesthesia plan.  Questions answered. Anesthesia consent signed with patient.  ASA Score: 3     Day of Surgery Review of History & Physical:    H&P update referred to the surgeon.         Ready For Surgery From Anesthesia Perspective.

## 2020-06-23 NOTE — OP NOTE
[facility]  Operative Note    Date: 06/23/2020    Pre-Op Diagnosis:  Neurogenic bladder     Patient Active Problem List    Diagnosis Date Noted    Swelling of both lower extremities 06/22/2020    Impaired functional mobility and activity tolerance 06/22/2020    Hypotension 05/16/2020    Urinary tract infection associated with cystostomy catheter 05/12/2020    Pain in both lower legs 02/06/2020    Left sided abdominal pain 02/05/2020    Constipation due to opioid therapy 01/30/2020    Left lower quadrant abdominal pain 01/27/2020    Partial small bowel obstruction 01/27/2020    Bradycardia, sinus 01/27/2020    Pure hypercholesterolemia 01/13/2020    Chronic diastolic heart failure 01/13/2020    Inflammatory spondylopathy of lumbar region 08/29/2019    Chronic venous insufficiency 05/23/2019    Mixed stress and urge urinary incontinence 05/13/2019    Dyspnea 02/22/2019    Costochondritis 02/22/2019    Recurrent UTI (urinary tract infection) 06/08/2018    Esophageal dysphagia 05/23/2018    Ischemic cardiomyopathy 03/08/2018    Suprapubic catheter 02/01/2018    Insomnia due to medical condition 12/08/2017    Bilateral carotid artery disease 11/14/2017    Scoliosis deformity of spine 03/31/2017    S/P insertion of spinal cord stimulator 03/31/2017    S/P insertion of intrathecal pump 03/31/2017    Paresthesia of both lower extremities 03/31/2017    Degenerative disc disease, lumbar 03/31/2017    Osteoarthritis of spine with radiculopathy, lumbar region 03/31/2017    Facet arthropathy, lumbar 03/31/2017    Lymphedema of both lower extremities 03/02/2017    Primary hypothyroidism 02/02/2017    Frequent falls 02/01/2017    Weight loss, unintentional 02/01/2017    Urinary retention 12/21/2016    Neurogenic bladder 09/27/2016    Drug-induced constipation 08/16/2016    GERD (gastroesophageal reflux disease) 08/16/2016    Coronary artery disease involving native coronary  artery of native heart without angina pectoris 08/16/2016    Narcotic dependency, continuous 07/18/2014    Thoracic aorta atherosclerosis 07/18/2014    Cervical myelopathy 05/13/2013    Chronic pain syndrome 05/01/2013    Major depressive disorder, recurrent, mild 02/21/2013    Generalized anxiety disorder     Iron deficiency anemia           Post-Op Diagnosis: same    Procedure(s) Performed:   1.  Cystoscopy with bladder botox injection 200 U injected  2.   Exchange of suprapubic catheter by MD    Specimen(s): none    Staff Surgeon:  Shireen Mayo MD    Assistant Surgeon: Chau Briones MD    Anesthesia: Monitored Local Anesthesia with Sedation    Indications: Tasha Hawley is a 64 y.o. female with neurogenic bladder with suprapubic catheter in place. She has a history of stress incontinence and is s/p autologous sling placement    Findings:      - 200 U botox in 20 cc injected into bladder detrusor; good wheals raised  - areas of bullous edema and erythema as seen before and previously biopsied; consistent with catheter irritation  - suprapubic catheter exchanged    Estimated Blood Loss: min    Drains: 20 fr silicone suprapubic catheter     Procedure in Detail:  After informed consent was obtained the patient was brought to the cystoscopy suite and placed in the supine position.  SCDs were applied and working.   The patient was placed in the dorsal lithotomy position prior to anesthetic administration.  She was then anesthetized and prepped and draped in the usual sterile fashion.      A rigid cystoscope in a 22 Fr sheath was introduced into the patients's bladder via the urethra.  This passed easily. The urethra was not patulous. Formal cystoscopy was performed which revealed the ureteral orifices in their normal anatomic position bilaterally.  No bladder trabeculations, stones or diverticuli were seen. There were multiple areas of the bladder with slight erythema and bullous edema, consistent with  catheterization. These areas have been previously biopsied.     We then proceeded with botox injection.  200 units of botox in 20 cc was injected into the detrusor muscle throughout the bladder.  Good wheals were raised.  The scope was removed.      The suprapubic catheter was then exchanged under sterile conditions for a new 20 Fr silicone catheter filled with 30 ml sterile water.  The cystoscope was inserted to confirm inflation of the balloon inside the bladder. The cystoscope was then removed.    The patient tolerated the procedure well and was transferred to recovery in stable condition.     Disposition:  The patient will follow up with Dr. Mayo in 2 weeks.  She will continue per prescription for Levaquin (7 days total) and was instructed to take ibuprofen as needed for pain.    MD ANH Fuller was present for the entire case and agree with the above note.

## 2020-06-23 NOTE — DISCHARGE SUMMARY
[facility]  Discharge Note  Short Stay    Admit Date: 6/23/2020    Discharge Date and Time: 06/23/2020 10:24 AM      Attending Physician: Shireen Mayo MD     Discharge Provider: Chau Briones    Diagnoses:  Active Hospital Problems    Diagnosis  POA    Neurogenic bladder [N31.9]  Yes     Chronic      Resolved Hospital Problems   No resolved problems to display.       Discharged Condition: good    Hospital Course: Patient was admitted for cystoscopy with Botox injections and suprapubic catheter exchange and tolerated the procedure well with no complications. The patient was discharged home in good condition on the same day.       Final Diagnoses: Same as principal problem.    Disposition: Home or Self Care    Follow up/Patient Instructions:    Medications:  Reconciled Home Medications:   Current Discharge Medication List      START taking these medications    Details   phenazopyridine (PYRIDIUM) 100 MG tablet Take 1 tablet (100 mg total) by mouth 3 (three) times daily as needed for Pain.  Qty: 9 tablet, Refills: 0         CONTINUE these medications which have NOT CHANGED    Details   aspirin (ECOTRIN) 81 MG EC tablet Take 1 tablet (81 mg total) by mouth once daily.  Refills: 0      atorvastatin (LIPITOR) 80 MG tablet TAKE 1 TABLET BY MOUTH DAILY  Qty: 90 tablet, Refills: 3    Associated Diagnoses: Mixed hyperlipidemia      butalbital-acetaminophen-caffeine -40 mg (FIORICET, ESGIC) -40 mg per tablet Take 1 tablet by mouth every 4 (four) hours as needed for Headaches.       FLUoxetine 20 MG capsule Take 3 capsules (60 mg total) by mouth once daily.  Qty: 270 capsule, Refills: 1    Associated Diagnoses: Anxiety      furosemide (LASIX) 40 MG tablet Take 2 tablets every morning and one every evening  Qty: 270 tablet, Refills: 3    Associated Diagnoses: Lymphedema of both lower extremities      HYDROcodone-acetaminophen (NORCO)  mg per tablet Take 1 tablet by mouth every 6 (six) hours as  needed for Pain.  Qty: 15 tablet, Refills: 0      levothyroxine (SYNTHROID) 125 MCG tablet Take 1 tablet (125 mcg total) by mouth before breakfast.  Qty: 90 tablet, Refills: 3    Associated Diagnoses: Primary hypothyroidism      oxybutynin (DITROPAN XL) 15 MG TR24 Take 1 tablet (15 mg total) by mouth once daily.  Qty: 90 tablet, Refills: 3    Associated Diagnoses: Neurogenic bladder      pantoprazole (PROTONIX) 40 MG tablet Take 1 tablet (40 mg total) by mouth once daily.  Qty: 90 tablet, Refills: 3    Associated Diagnoses: Esophageal dysphagia      polyethylene glycol (GLYCOLAX) 17 gram PwPk Take 17 g by mouth once daily.  Qty: 30 each, Refills: 2      potassium chloride (MICRO-K) 10 MEQ CpSR Take 2 capsules (20 mEq total) by mouth once daily. for 90 doses  Qty: 180 capsule, Refills: 3    Associated Diagnoses: Lymphedema of both lower extremities      promethazine (PHENERGAN) 25 MG tablet Take 25 mg by mouth every 6 (six) hours as needed for Nausea.      QUEtiapine (SEROQUEL) 100 MG Tab TAKE 1 TABLET (100 MG TOTAL) BY MOUTH EVERY EVENING.  Qty: 30 tablet, Refills: 0    Comments: Covering physician will only provide with 30days      senna (SENNA LAX) 8.6 mg tablet Take 2 tablets by mouth 2 (two) times daily.      traZODone (DESYREL) 100 MG tablet Take 3 tablets (300 mg total) by mouth every evening.  Qty: 90 tablet, Refills: 0    Comments: Covering physician will only provide for 30 days  Associated Diagnoses: Insomnia, unspecified type      ascorbic acid, vitamin C, (VITAMIN C) 500 MG tablet Take 500 mg by mouth once daily.      gabapentin (NEURONTIN) 100 MG capsule Take 1 capsule (100 mg total) by mouth 3 (three) times daily.  Qty: 90 capsule, Refills: 2      intrathecal pain pump compound Hydromorphone (7.5 mg/mL) infusion at 3.6799 mg/day (0.1533 mg/hr) out of a total reservoir volume of 37.3 mL  Pump filled every 2 months      levoFLOXacin (LEVAQUIN) 500 MG tablet Take 1 tablet (500 mg total) by mouth once  daily. for 7 days  Qty: 7 tablet, Refills: 0    Associated Diagnoses: Positive urine culture         STOP taking these medications       lamotrigine (LAMICTAL) 25 MG tablet Comments:   Reason for Stopping:             Discharge Procedure Orders   Diet general     Follow-up Information     Shireen Mayo MD In 2 weeks.    Specialty: Urology  Contact information:  [id number]  [facility] [id number] [phone]                   As above

## 2020-06-23 NOTE — DISCHARGE INSTRUCTIONS
"  Discharge Instructions: Caring for Your Suprapubic Catheter  You are going home with a suprapubic catheter in place. This tube is placed directly into the bladder through your abdomen to drain urine from your bladder. You were shown how to care for your catheter in the hospital. This sheet will help remind you of those steps and guidelines when you are at home.  Home care  · Shower as necessary.   · Change your dressing every day. Change the dressing more often if it falls off, becomes dirty, or has absorbed a lot of drainage.  Gather your supplies  · Tape  · Povidone-iodine ointment  · Cotton swabs  · Wastebasket and plastic bag  · Povidone-iodine swab sticks (or cotton balls and povidone-iodine solution)  · Dressing sponges (4" x 4") that are cut or split shelter into the middle  Remove the dressing and check for problems  · Wash your hands thoroughly before and after all catheter care.  · Gently remove the old dressing if you have one.  ¨ Dont pull on the tube.  ¨ Check the dressing for drainage. Notice whether anything looks unusual or smells bad.  ¨ Place your dressing in the plastic bag and throw it away in the wastebasket.  · Now look at the place where the catheter leaves your body (exit site).  ¨ Note any swelling, bleeding, irritation, unusual or smelly drainage.  ¨ Also check for any sores next to the exit site. Sores form around the exit site if there is too much pressure from the tube on the skin.  Clean the area  · Wash around the shield gently with soap and water.  · Use a povidone-iodine swab stick to clean under the shield.  ¨ Clean around the exit site of the catheter.  ¨ Start at the exit site and clean outward in a circular motion, about 3 to 4 inches from the site.Dont clean back toward the tube.  ¨ Throw away the used swab stick and repeat the cleaning procedure with a new one.  ¨ Let your skin dry completely.  · Place a split 4" x 4" sponge around the catheter. Tape it in place.  · Smear a " thin layer of povidone-iodine ointment around the catheter with a cotton swab.  Follow-up care  Make a follow-up appointment or as advised.  When to call your healthcare provider  Call your health care provider right away if you have any of the following:  · Catheter that falls out, or is clogged or feels clogged  · Stitches that fall out  · Urine leaking around catheter  · Urine that is cloudy, bloody, or smells bad  · No urine drainage  · Bladder that feels full or painful  · Rash, itching, redness, swelling, or drainage at the catheter site  · Fever above 100.4°F (38.°C) or shaking chills   Date Last Reviewed: 1/1/2017  © 9418-4106 [employer]. [address] [place] [address], [place] [address]. All rights reserved. This information is not intended as a substitute for professional medical care. Always follow your healthcare professional's instructions.

## 2020-06-23 NOTE — INTERVAL H&P NOTE
The patient has been examined and the H&P has been reviewed:    I concur with the findings and no changes have occurred since H&P was written.   Patient has been taking Levaquin x 3 days. Urine dipstick negative for nitrites and LE, positive for blood.    Anesthesia/Surgery risks, benefits and alternative options discussed and understood by patient/family.          Active Hospital Problems    Diagnosis  POA    Neurogenic bladder [N31.9]  Yes     Chronic      Resolved Hospital Problems   No resolved problems to display.     Patient was placed on Levaquin 2 days ago.  Proceed with planned procedure.

## 2020-06-23 NOTE — ANESTHESIA POSTPROCEDURE EVALUATION
Anesthesia Post Evaluation    Patient: Tasha Hawley    Procedure(s) Performed: Procedure(s) (LRB):  CYSTOSCOPY,WITH BOTULINUM TOXIN INJECTION/ 200 units (N/A)  EXCHANGE, SUPRAPUBIC CATHETER    Final Anesthesia Type: general    Patient location during evaluation: PACU  Patient participation: Yes- Able to Participate  Level of consciousness: awake and alert  Post-procedure vital signs: reviewed and stable  Pain management: adequate  Airway patency: patent    PONV status at discharge: No PONV  Anesthetic complications: no      Cardiovascular status: blood pressure returned to baseline  Respiratory status: unassisted, spontaneous ventilation and room air  Hydration status: euvolemic  Follow-up not needed.          Vitals Value Taken Time   /65 06/23/20 1132   Temp 36.8 °C (98.2 °F) 06/23/20 1010   Pulse 48 06/23/20 1136   Resp 16 06/23/20 1115   SpO2 99 % 06/23/20 1136   Vitals shown include unvalidated device data.      No case tracking events are documented in the log.      Pain/Low Score: Low Score: 9 (6/23/2020 11:03 AM)

## 2020-06-23 NOTE — TRANSFER OF CARE
"Anesthesia Transfer of Care Note    Patient: Tasha Hawley    Procedure(s) Performed: Procedure(s) (LRB):  CYSTOSCOPY,WITH BOTULINUM TOXIN INJECTION/ 200 units (N/A)  EXCHANGE, SUPRAPUBIC CATHETER    Patient location: PACU    Anesthesia Type: general    Transport from OR: Transported from OR on 6-10 L/min O2 by face mask with adequate spontaneous ventilation    Post pain: adequate analgesia    Post assessment: no apparent anesthetic complications and tolerated procedure well    Post vital signs: stable    Level of consciousness: awake    Nausea/Vomiting: no nausea/vomiting    Complications: none    Transfer of care protocol was followed      Last vitals:   Visit Vitals  BP (!) 96/47 (BP Location: Left arm, Patient Position: Lying)   Pulse (!) (P) 50   Temp (P) 36.8 °C (98.2 °F) (Temporal)   Resp (P) 18   Ht 5' 4" (1.626 m)   Wt 83 kg (183 lb)   LMP  (LMP Unknown)   SpO2 (P) 100%   Breastfeeding No   BMI 31.41 kg/m²     "

## 2020-06-27 PROCEDURE — G0179 MD RECERTIFICATION HHA PT: HCPCS | Mod: ,,, | Performed by: UROLOGY

## 2020-06-27 PROCEDURE — G0179 PR HOME HEALTH MD RECERTIFICATION: ICD-10-PCS | Mod: ,,, | Performed by: UROLOGY

## 2020-06-30 ENCOUNTER — TELEPHONE (OUTPATIENT)
Dept: INTERNAL MEDICINE | Facility: CLINIC | Age: 65
End: 2020-06-30

## 2020-06-30 NOTE — TELEPHONE ENCOUNTER
----- Message from Haven Bryant sent at 6/30/2020  8:26 AM CDT -----  Contact: Dangelo() [phone]  Pt's  is requesting a call regarding appt. Please advise

## 2020-07-01 ENCOUNTER — TELEPHONE (OUTPATIENT)
Dept: UROLOGY | Facility: CLINIC | Age: 65
End: 2020-07-01

## 2020-07-01 NOTE — TELEPHONE ENCOUNTER
Patient stated that she is still leaking and still having pressure after her procedure. Some burning, no pain only discomfort.    ----- Message from Sis Calixto sent at 7/1/2020 11:26 AM CDT -----  Contact: pt  Pt is calling to speak with staff, state she has bladder pressure and leakage         Please contact pt: [phone] (home)

## 2020-07-06 ENCOUNTER — NURSE TRIAGE (OUTPATIENT)
Dept: ADMINISTRATIVE | Facility: CLINIC | Age: 65
End: 2020-07-06

## 2020-07-06 ENCOUNTER — CLINICAL SUPPORT (OUTPATIENT)
Dept: REHABILITATION | Facility: HOSPITAL | Age: 65
End: 2020-07-06
Attending: INTERNAL MEDICINE
Payer: MEDICARE

## 2020-07-06 DIAGNOSIS — M79.89 SWELLING OF BOTH LOWER EXTREMITIES: ICD-10-CM

## 2020-07-06 DIAGNOSIS — Z74.09 IMPAIRED FUNCTIONAL MOBILITY AND ACTIVITY TOLERANCE: ICD-10-CM

## 2020-07-06 DIAGNOSIS — I89.0 LYMPHEDEMA OF BOTH LOWER EXTREMITIES: Primary | ICD-10-CM

## 2020-07-06 PROCEDURE — 97140 MANUAL THERAPY 1/> REGIONS: CPT | Mod: HCNC,PO

## 2020-07-06 NOTE — PROGRESS NOTES
Physical Therapy Daily Treatment Note     Name: Tasha Hawley  Clinic Number: [id number]    Therapy Diagnosis:   Encounter Diagnoses   Name Primary?    Swelling of both lower extremities     Impaired functional mobility and activity tolerance     Lymphedema of both lower extremities Yes     Physician: Kalpesh Haney MD*    Visit Date: 7/6/2020        Physician Orders: PT Eval and Treat lymphedema  Medical Diagnosis from Referral:   Diagnosis   I89.0 (ICD-10-CM) - Lymphedema of both lower extremities         Evaluation Date: 6/22/2020  Authorization Period Expiration: 12/22/20  Plan of Care Expiration: 9/14/20  Visit # / Visits authorized: 2/ 30     Time In: 955a  Time Out: 1050a  Total Billable Time: 55 minutes     Precautions: Standard, Fall, CHF and 12 spinal surgeries, pain pump, suprapubic catheter      Subjective     Pt reports: not using wc today or bringing walker to clinic- pt has chronic back pain from injury that has led to or complicated all of her health issues..  She was not compliant with home exercise program.  Response to previous treatment: spent last 2-3 days in bed due to back pain - but feels legs are smaller due to elevation.  Functional change:  is present and must assist  Did manage [employer] with stairs - needed help of 3 people    Pain: 9/10  Location: back pain - chronic, has pain pump  Legs are sensitive to touch roro lower legs B     Objective   Sickly appearing female- to dept - remains very kyphotic with trunk flexion and scoliotic curves- HHA of  to walk ~ 50 ft to treatment room- to assist  No compression, cotton socks and tennis shoes- Wearing tennis shoes, basic socks to mid leg, no compression   Supra pubic catheter with attachment along R thigh for bag  States mid abdomin to pubic access  Has spinal pain pump at R flank  COVID precautions with mask- pt hard of hearing and  assists  Amount of Swelling: Location of Swelling: full abdominal  region of body habitus/obesity  Full thighs with obesity and edema  Lower legs with mod fullness, pink coloring and tenderness to touch  Mild/mod ankles- less in feet - Stemmer signs B  Dark thick fungal changes to nails   Hammer toes B. Thick callous mid foot with fallen arch and bony prominence    Skin Integrity: scattered spider veins, dry heels, thick nails, small scrape L Le - healing with scab  Palpation/Texture: min pitting in lower legs- very tender to LE, full thighs   Circulation: cool feet, pulses palpable LE      Pt with min/mod a to position on table, head elevated, legs elevated on wedge- c/o back pain with positional changes   Treatment:   Tasha received the following manual therapy techniques:- Manual Lymphatic Drainage were applied to the: L LE for 50 minutes, including: MLD and short stretch compression bandaging   R LE encouraged to consider compression socks or tubigrip     MANUAL LYMPHATIC DRAINAGE (MLD):    While supine with LEs elevated stimulation at terminus, along GI region, B inguinal regions, drainage of entire L LE roro lower leg, ankle, and foot with return proximally,  Use of Aquaphor due to dryness.   Mild tenderness to MLD ant lower leg- c/o pain and soreness in general to B LE and back   Educated in self massage to abdominal areas, B inguinal areas, thigh, and remaining LE within reach.    MULTILAYERED BANDAGING:  issued supplies and bandaged L LE with cotton stockinette, rosidal soft section dorsum of foot, rolls ankle to knee, 1-8cm and 2- 10cm Durelast rolls foot to knee, to leave intact 12-24 hrs as tolerated, discontinue with any problems, return rolled bandages next session. Wash and wear schedules confirmed.   Confirmed intact sensation  Confirmed to remove if painful or too tight.  Noted chronic back and leg pains vs onset with bandaging for compression support.     Tasha received therapeutic exercises to develop ROM and flexibility for 5 minutes including: muscle pump  support, aps, avoid dependency and avoid immobility.   Pt encouraged to bring RW to clinic for safety- mod A bed mobility.   THERAPEUTIC EXERCISES:  Continue HEP of AROM, stretching, and postural correction.   Home Exercises Provided and Patient Education Provided     Education provided:    compression needs and support of medical management  PATIENT/FAMILY Education: bandaging wear schedule,  HEP,  Beginning of self massage,  Self or assisted bandaging, compression options, and Risk reduction    Written Home Exercises Provided: Patient instructed to cont prior HEP.  Exercises were reviewed and Tasha was able to demonstrate them prior to the end of the session.  Tasha demonstrated fair  understanding of the education provided.       Assessment   Tasha is a 64 y.o. female referred to outpatient Physical Therapy with a medical diagnosis of   Diagnosis   I89.0 (ICD-10-CM) - Lymphedema of both lower extremities   This patient presents s/p multiple surgical procedures for spine/back pain and pain management, limited ,mobility and function per chronic pain and disability, pain pump placement, obesity, immobility, dependency all contributing to : lymphedema of the B LE, increased pain, increased stiffness in the back, LE, as well as difficulty performing functional mobility, compression needs, walking, transitions, self care , and placing the pt at higher risk of infection.   Severity and chronicity of LE coupled with multiple medical co morbidities- chronic pain, surgical management,  as well as morbid obesity will limit successful management of this condition unless other areas are addressed or minimized. Compression options are limited by size, shape, fit and wear tolerance as well as possible need for custom sizing with related cost considerations. .   Therapy will not be successful unless medical issues, pain, and obesity are addressed. Limited expectations and chronic nature.   Therapy may assist with education and  training regarding need for compression support. Then compliance with purchase of compression needs and compliance with daily use are required    Pt's B LE did appear less full than on initial evaluation. Pt is limited in mobility and function due to postural changes, weakness, limited functional mobility and activity tolerance and pain.  Pt will need compression support for her B LE while up and moving.  Encouraged to avoid full bed rest as able and increase activity, change in position, and walking with RW-  is supportive and will assist.  Unclear if she will tolerate components of Complete decongestive Therapy.     Tasha is progressing well towards her goals.   Pt prognosis is Fair.     Pt will continue to benefit from skilled outpatient physical therapy to address the deficits listed in the problem list box on initial evaluation, provide pt/family education and to maximize pt's level of independence in the home and community environment.     Pt's spiritual, cultural and educational needs considered and pt agreeable to plan of care and goals.     Anticipated barriers to physical therapy: chronic pain, compliance, tolerance to compression, catheter, immobility    Goals: The following goals were discussed with the patient and patient is in agreement with them as to be addressed in the treatment plan.      Short Term Goals: (6 weeks)  * all goals with  to assist  1. Patient will show decreased girth in B LE by up to 2 cm to allow for LE symmetry, shoe and clothing choice, and ability to apply needed compression.  (progressing, not met)   2. Patient will demonstrate 100% knowledge of lymphedema precautions and signs of infection to allow for reduced lymphedema risk, infection risk, and/or exacerbation of condition.  (progressing, not met)  3. Patient or caregiver will perform self-bandaging techniques and/or wearing of compression garments to allow for lymphatic drainage support, skin elasticity, and  reduction in shape and size of limb. (progressing, not met)  4. Patient will perform self lymph drainage techniques to areas within reach to enhance lymphatic drainage and skin condition.  (progressing, not met)  5. Patient will tolerate daily activities with multilayered bandaging to allow for lymphatic and venous support.  (progressing, not met)     Long Term Goals: (12  Weeks)  * all goals with  to assist  1. Patient will show decreased girth in B LE by up to 3 cm  to allow for LE symmetry, shoe and clothing choice, and ability to apply needed compression daily.  (progressing, not met)  2. Patient will show reduction in density to mild or less with improved contour of limb to allow for cosmesis, LE symmetry, infection risk reduction, and clothing and compression choice.   (progressing, not met)  3. Patient to vernell/doff compression garment with daily compliance to assist in lymphedema management, skin elasticity, and tissue density.  (progressing, not met)  4. Pt to show improved postural awareness and alignment.  (progressing, not met)  5. Pt to be I and compliant with HEP to allow for increased function in affected limb.   (progressing, not met)     Plan   Continue PT  2x   weekly for Complete Decongestive Therapy:  Manual lymphatic drainage, Multilayered short stretch bandaging, Pneumatic compression, Therapeutic exercises, Patient education as deemed necessary to achieve stated goals.   to assist  Determine compression needs    Tracey Bunch, PT

## 2020-07-06 NOTE — TELEPHONE ENCOUNTER
Day 13 post procedure follow up call attempted, no contact made. Left VM message. Member of triage team will retry tomorrow per protocol.       Reason for Disposition   Message left on unidentified voice mail. Phone number verified.    Additional Information   Negative: Caller has already spoken with the PCP (or office), and has no further questions   Negative: Caller has already spoken with another triager and has no further questions   Negative: Caller has already spoken with another triager or PCP (or office), and has further questions and triager able to answer questions.   Negative: Message left on identified voicemail   Negative: No answer.  First attempt to contact caller.  Follow-up call scheduled within 15 minutes.   Negative: Busy signal.  First attempt to contact caller.  Follow-up call scheduled within 15 minutes.    Protocols used: NO CONTACT OR DUPLICATE CONTACT CALL-A-[place]

## 2020-07-07 ENCOUNTER — EXTERNAL HOME HEALTH (OUTPATIENT)
Dept: HOME HEALTH SERVICES | Facility: HOSPITAL | Age: 65
End: 2020-07-07
Payer: MEDICARE

## 2020-07-08 ENCOUNTER — CLINICAL SUPPORT (OUTPATIENT)
Dept: REHABILITATION | Facility: HOSPITAL | Age: 65
End: 2020-07-08
Attending: INTERNAL MEDICINE
Payer: MEDICARE

## 2020-07-08 DIAGNOSIS — R60.0 EDEMA OF BOTH LOWER EXTREMITIES: Primary | ICD-10-CM

## 2020-07-08 DIAGNOSIS — M79.89 SWELLING OF BOTH LOWER EXTREMITIES: ICD-10-CM

## 2020-07-08 DIAGNOSIS — I89.0 LYMPHEDEMA OF BOTH LOWER EXTREMITIES: Primary | ICD-10-CM

## 2020-07-08 DIAGNOSIS — Z74.09 IMPAIRED FUNCTIONAL MOBILITY AND ACTIVITY TOLERANCE: ICD-10-CM

## 2020-07-08 PROCEDURE — 97140 MANUAL THERAPY 1/> REGIONS: CPT | Mod: HCNC,PO

## 2020-07-08 NOTE — PROGRESS NOTES
Physical Therapy Daily Treatment Note     Name: Tasha Hawley  Clinic Number: [id number]    Therapy Diagnosis:   Encounter Diagnoses   Name Primary?    Swelling of both lower extremities     Impaired functional mobility and activity tolerance     Lymphedema of both lower extremities Yes     Physician: Kalpesh Haney MD*    Visit Date: 7/8/2020        Physician Orders: PT Eval and Treat lymphedema  Medical Diagnosis from Referral:   Diagnosis   I89.0 (ICD-10-CM) - Lymphedema of both lower extremities         Evaluation Date: 6/22/2020  Authorization Period Expiration: 12/22/20  Plan of Care Expiration: 9/14/20  Visit # / Visits authorized: 3/ 30     Time In: 1000a  Time Out: 1050a  Total Billable Time: 50 minutes     Precautions: Standard, Fall, CHF and 12 spinal surgeries, pain pump, suprapubic catheter      Subjective     Pt reports: good tolerance to bandaging - not really any changes to her legs   Today is more of bad back day.  She was not compliant with home exercise program.  Response to previous treatment: hasn't slept in 2 days - had to cancel another clinic visit with urology today.  Functional change:  is present and must assist  Arrives with RW today- gives more support  Spends long periods in bed - doesn't sleep well  C/o discomfort lying down for therapy services    Pain: 9/10  Location: back pain - chronic, has pain pump  Legs are sensitive to touch roro lower legs B   Some discomfort in legs    Objective   Sickly appearing female- to dept - remains very kyphotic with trunk flexion and scoliotic curves- RW plus  to walk ~ 50 ft to treatment room- to assist  No compression, cotton socks and tennis shoes- Wearing tennis shoes, basic socks to mid leg, no compression   Supra pubic catheter with attachment along R thigh for bag  States mid abdomin to pubic access  Has spinal pain pump at R flank  COVID precautions with mask- pt hard of hearing and  assists  Amount of  Swelling: Location of Swelling: full abdominal region of body habitus/obesity  Full thighs with obesity and edema  Lower legs with mod fullness, pink coloring and tenderness to touch  Mild/mod ankles- less in feet - Stemmer signs B  Dark thick fungal changes to nails   Hammer toes B. Thick callous mid foot with fallen arch and bony prominence    Skin Integrity: scattered spider veins, dry heels, thick nails, small scrape L Le - healing with scab  Palpation/Texture: min pitting in lower legs- very tender to LE, full thighs   Circulation: cool feet, pulses palpable LE      Pt with min/mod a to position on table, head elevated, legs elevated on wedge- c/o back pain with positional changes     Treatment:   Tasha received the following manual therapy techniques:- Manual Lymphatic Drainage were applied to the: L LE for 50 minutes, including: MLD and short stretch compression bandaging   R LE encouraged to consider compression socks or tubigrip     MANUAL LYMPHATIC DRAINAGE (MLD):    While supine with LEs elevated stimulation at terminus, along GI region, B inguinal regions, drainage of entire L LE roro lower leg, ankle, and foot with return proximally,  Use of Aquaphor due to dryness.   Mild tenderness to MLD ant lower leg- c/o pain and soreness in general to B LE and back   Educated in self massage to abdominal areas, B inguinal areas, thigh, and remaining LE within reach.    MULTILAYERED BANDAGING:  issued supplies and bandaged L LE with cotton stockinette, rosidal soft section dorsum of foot, rolls ankle to knee, 1-8cm and 2- 10cm Durelast rolls foot to knee, to leave intact 12-24 hrs as tolerated, discontinue with any problems, return rolled bandages next session. Wash and wear schedules confirmed.   Confirmed intact sensation  Confirmed to remove if painful or too tight.  Noted chronic back and leg pains vs onset with bandaging for compression support.     Tasha received therapeutic exercises to develop ROM and  flexibility for 5 minutes including: muscle pump support, aps, avoid dependency and avoid immobility.   Pt encouraged to bring RW to clinic for safety- mod A bed mobility.   THERAPEUTIC EXERCISES:  Continue HEP of AROM, stretching, and postural correction.   Home Exercises Provided and Patient Education Provided     Education provided:    compression needs and support of medical management  Demo of products   requests knee high garments  Sent recommendation request to Dr. Haney  Reviewed daily compression use 20-30mmHg - discussed tolerance and compliance  PATIENT/FAMILY Education: bandaging wear schedule,  HEP,  Beginning of self massage,  Self or assisted bandaging, compression options, and Risk reduction    Written Home Exercises Provided: Patient instructed to cont prior HEP.  Exercises were reviewed and Tasha was able to demonstrate them prior to the end of the session.  Tasha demonstrated fair  understanding of the education provided.   Assessment   Tasha is a 64 y.o. female referred to outpatient Physical Therapy with a medical diagnosis of   Diagnosis   I89.0 (ICD-10-CM) - Lymphedema of both lower extremities   This patient presents s/p multiple surgical procedures for spine/back pain and pain management, limited ,mobility and function per chronic pain and disability, pain pump placement, obesity, immobility, dependency all contributing to : lymphedema of the B LE, increased pain, increased stiffness in the back, LE, as well as difficulty performing functional mobility, compression needs, walking, transitions, self care , and placing the pt at higher risk of infection. Severity and chronicity of LE coupled with multiple medical co morbidities- chronic pain, surgical management,  as well as morbid obesity will limit successful management of this condition unless other areas are addressed or minimized. Compression options are limited by size, shape, fit and wear tolerance as well as possible need for  custom sizing with related cost considerations. .   Therapy will not be successful unless medical issues, pain, and obesity are addressed. Limited expectations and chronic nature.   Therapy may assist with education and training regarding need for compression support. Then compliance with purchase of compression needs and compliance with daily use are required    Pt continues to c/o back pain, limited tolerance to mobility and position.  Pt will need regular daily compression choice- cost, fit, application and tolerance may be limited. Will attempt B LE next session if tolerated. Encouraged walking with [employer].     Pt will need compression support for her B LE while up and moving.  Encouraged to avoid full bed rest as able and increase activity, change in position, and walking with RW-  is supportive and will assist.  Unclear if she will tolerate components of Complete decongestive Therapy.     Tasha is progressing well towards her goals.   Pt prognosis is Fair.     Pt will continue to benefit from skilled outpatient physical therapy to address the deficits listed in the problem list box on initial evaluation, provide pt/family education and to maximize pt's level of independence in the home and community environment.     Pt's spiritual, cultural and educational needs considered and pt agreeable to plan of care and goals.     Anticipated barriers to physical therapy: chronic pain, compliance, tolerance to compression, catheter, immobility    Goals: The following goals were discussed with the patient and patient is in agreement with them as to be addressed in the treatment plan.      Short Term Goals: (6 weeks)  * all goals with  to assist  1. Patient will show decreased girth in B LE by up to 2 cm to allow for LE symmetry, shoe and clothing choice, and ability to apply needed compression.  (progressing, not met)   2. Patient will demonstrate 100% knowledge of lymphedema precautions and signs of  infection to allow for reduced lymphedema risk, infection risk, and/or exacerbation of condition.  (progressing, not met)  3. Patient or caregiver will perform self-bandaging techniques and/or wearing of compression garments to allow for lymphatic drainage support, skin elasticity, and reduction in shape and size of limb. (progressing, not met)  4. Patient will perform self lymph drainage techniques to areas within reach to enhance lymphatic drainage and skin condition.  (progressing, not met)  5. Patient will tolerate daily activities with multilayered bandaging to allow for lymphatic and venous support.  (progressing, not met)     Long Term Goals: (12  Weeks)  * all goals with  to assist  1. Patient will show decreased girth in B LE by up to 3 cm  to allow for LE symmetry, shoe and clothing choice, and ability to apply needed compression daily.  (progressing, not met)  2. Patient will show reduction in density to mild or less with improved contour of limb to allow for cosmesis, LE symmetry, infection risk reduction, and clothing and compression choice.   (progressing, not met)  3. Patient to vernell/doff compression garment with daily compliance to assist in lymphedema management, skin elasticity, and tissue density.  (progressing, not met)  4. Pt to show improved postural awareness and alignment.  (progressing, not met)  5. Pt to be I and compliant with HEP to allow for increased function in affected limb.   (progressing, not met)     Plan   Continue PT  2x   weekly for Complete Decongestive Therapy:  Manual lymphatic drainage, Multilayered short stretch bandaging, Pneumatic compression, Therapeutic exercises, Patient education as deemed necessary to achieve stated goals.   to assist  Determine compression needs    Tarcey Bunch, PT

## 2020-07-15 ENCOUNTER — DOCUMENTATION ONLY (OUTPATIENT)
Dept: REHABILITATION | Facility: HOSPITAL | Age: 65
End: 2020-07-15

## 2020-07-15 DIAGNOSIS — M79.89 SWELLING OF BOTH LOWER EXTREMITIES: ICD-10-CM

## 2020-07-15 DIAGNOSIS — I89.0 LYMPHEDEMA OF BOTH LOWER EXTREMITIES: Primary | ICD-10-CM

## 2020-07-15 DIAGNOSIS — Z74.09 IMPAIRED FUNCTIONAL MOBILITY AND ACTIVITY TOLERANCE: ICD-10-CM

## 2020-07-15 NOTE — PROGRESS NOTES
Physical Therapy-  7/13/20 was no show  7/15/20 pt cancelled with message no transportation    Pt shows rescheduled for 8/5/20.

## 2020-07-30 ENCOUNTER — OFFICE VISIT (OUTPATIENT)
Dept: PSYCHIATRY | Facility: CLINIC | Age: 65
End: 2020-07-30
Payer: MEDICARE

## 2020-07-30 VITALS
WEIGHT: 186.38 LBS | SYSTOLIC BLOOD PRESSURE: 93 MMHG | DIASTOLIC BLOOD PRESSURE: 50 MMHG | HEART RATE: 50 BPM | BODY MASS INDEX: 32 KG/M2

## 2020-07-30 DIAGNOSIS — G47.00 INSOMNIA, UNSPECIFIED TYPE: ICD-10-CM

## 2020-07-30 DIAGNOSIS — F41.9 ANXIETY: ICD-10-CM

## 2020-07-30 PROCEDURE — 99214 PR OFFICE/OUTPT VISIT, EST, LEVL IV, 30-39 MIN: ICD-10-PCS | Mod: HCNC,S$GLB,, | Performed by: PSYCHIATRY & NEUROLOGY

## 2020-07-30 PROCEDURE — 99214 OFFICE O/P EST MOD 30 MIN: CPT | Mod: HCNC,S$GLB,, | Performed by: PSYCHIATRY & NEUROLOGY

## 2020-07-30 PROCEDURE — 99999 PR PBB SHADOW E&M-EST. PATIENT-LVL I: CPT | Mod: PBBFAC,HCNC,, | Performed by: PSYCHIATRY & NEUROLOGY

## 2020-07-30 PROCEDURE — 3008F BODY MASS INDEX DOCD: CPT | Mod: HCNC,CPTII,S$GLB, | Performed by: PSYCHIATRY & NEUROLOGY

## 2020-07-30 PROCEDURE — 3008F PR BODY MASS INDEX (BMI) DOCUMENTED: ICD-10-PCS | Mod: HCNC,CPTII,S$GLB, | Performed by: PSYCHIATRY & NEUROLOGY

## 2020-07-30 PROCEDURE — 99999 PR PBB SHADOW E&M-EST. PATIENT-LVL I: ICD-10-PCS | Mod: PBBFAC,HCNC,, | Performed by: PSYCHIATRY & NEUROLOGY

## 2020-07-30 RX ORDER — TRAZODONE HYDROCHLORIDE 100 MG/1
300 TABLET ORAL NIGHTLY
Qty: 270 TABLET | Refills: 1 | Status: SHIPPED | OUTPATIENT
Start: 2020-07-30 | End: 2021-01-07 | Stop reason: SDUPTHER

## 2020-07-30 RX ORDER — QUETIAPINE FUMARATE 25 MG/1
TABLET, FILM COATED ORAL
Qty: 180 TABLET | Refills: 1 | Status: SHIPPED | OUTPATIENT
Start: 2020-07-30 | End: 2020-08-31

## 2020-07-30 RX ORDER — FLUOXETINE HYDROCHLORIDE 20 MG/1
60 CAPSULE ORAL DAILY
Qty: 270 CAPSULE | Refills: 1 | Status: SHIPPED | OUTPATIENT
Start: 2020-07-30 | End: 2020-09-15

## 2020-07-30 RX ORDER — QUETIAPINE FUMARATE 100 MG/1
TABLET, FILM COATED ORAL
Qty: 90 TABLET | Refills: 1 | Status: SHIPPED | OUTPATIENT
Start: 2020-07-30 | End: 2020-09-15 | Stop reason: SDUPTHER

## 2020-07-30 NOTE — PROGRESS NOTES
ESTABLISHED OUTPATIENT VISIT   E/M LEVEL 4: [id number]    ENCOUNTER DATE: 7/30/2020  SITE: [facility], [facility]    HISTORY    CHIEF COMPLAINT   Tasha Hawley is a 64 y.o. female who presents for follow up of depression/anxiety    HPI     Reports anxiety. Sleeping about 4 hours per night. Physical pain is troublesome.    Psychiatric Review Of Systems - Is patient experiencing or having changes in:  sleep: no  appetite: no  weight: no  energy/anergy: no  interest/pleasure/anhedonia: no  somatic symptoms: no  libido: no  anxiety/panic: no  guilty/hopelessness: no  concentration: no  S.I.B.s/risky behavior: no  Irritability: no  Racing thoughts: no  Impulsive behaviors: no  Paranoia:no  AVH:no    Recent alcohol: no  Recent drug: no    Medical ROS    General ROS: has suprapubic catheter, uses walker  ENT ROS: negative  Cardiovascular ROS: negative  Respiratory ROS: negative  Gastrointestinal ROS: negative  Neurological ROS: negative  Dermatological ROS: negative  Endocrine ROS: negative    PAST MEDICAL, FAMILY AND SOCIAL HISTORY: The patient's past medical, family and social history have been reviewed and updated as appropriate within the electronic medical record - see encounter notes.    PSYCHOTROPIC MEDICATIONS   Prozac 60 mg qam, Seroquel 100 mg at bedtime, Seroquel 25 mg bid prn, Trazodone 300 mg at bedtime    Scheduled and PRN Medications     Current Outpatient Medications:     ascorbic acid, vitamin C, (VITAMIN C) 500 MG tablet, Take 500 mg by mouth once daily., Disp: , Rfl:     aspirin (ECOTRIN) 81 MG EC tablet, Take 1 tablet (81 mg total) by mouth once daily., Disp: , Rfl: 0    atorvastatin (LIPITOR) 80 MG tablet, TAKE 1 TABLET BY MOUTH DAILY, Disp: 90 tablet, Rfl: 3    butalbital-acetaminophen-caffeine -40 mg (FIORICET, ESGIC) -40 mg per tablet, Take 1 tablet by mouth every 4 (four) hours as needed for Headaches. , Disp: , Rfl:     FLUoxetine 20 MG capsule, Take 3 capsules (60  mg total) by mouth once daily., Disp: 270 capsule, Rfl: 1    furosemide (LASIX) 40 MG tablet, Take 2 tablets every morning and one every evening, Disp: 270 tablet, Rfl: 3    gabapentin (NEURONTIN) 100 MG capsule, Take 1 capsule (100 mg total) by mouth 3 (three) times daily., Disp: 90 capsule, Rfl: 2    HYDROcodone-acetaminophen (NORCO)  mg per tablet, Take 1 tablet by mouth every 6 (six) hours as needed for Pain., Disp: 15 tablet, Rfl: 0    intrathecal pain pump compound, Hydromorphone (7.5 mg/mL) infusion at 3.6799 mg/day (0.1533 mg/hr) out of a total reservoir volume of 37.3 mL Pump filled every 2 months, Disp: , Rfl:     levothyroxine (SYNTHROID) 125 MCG tablet, Take 1 tablet (125 mcg total) by mouth before breakfast., Disp: 90 tablet, Rfl: 3    oxybutynin (DITROPAN XL) 15 MG TR24, Take 1 tablet (15 mg total) by mouth once daily., Disp: 90 tablet, Rfl: 3    pantoprazole (PROTONIX) 40 MG tablet, Take 1 tablet (40 mg total) by mouth once daily., Disp: 90 tablet, Rfl: 3    polyethylene glycol (GLYCOLAX) 17 gram PwPk, Take 17 g by mouth once daily., Disp: 30 each, Rfl: 2    potassium chloride (MICRO-K) 10 MEQ CpSR, Take 2 capsules (20 mEq total) by mouth once daily. for 90 doses, Disp: 180 capsule, Rfl: 3    promethazine (PHENERGAN) 25 MG tablet, Take 25 mg by mouth every 6 (six) hours as needed for Nausea., Disp: , Rfl:     QUEtiapine (SEROQUEL) 100 MG Tab, TAKE 1 TABLET (100 MG TOTAL) BY MOUTH EVERY EVENING., Disp: 30 tablet, Rfl: 0    senna (SENNA LAX) 8.6 mg tablet, Take 2 tablets by mouth 2 (two) times daily., Disp: , Rfl:     traZODone (DESYREL) 100 MG tablet, Take 3 tablets (300 mg total) by mouth every evening., Disp: 90 tablet, Rfl: 0    EXAMINATION  Wt Readings from Last 3 Encounters:   06/23/20 83 kg (183 lb)   06/02/20 80.4 kg (177 lb 4 oz)   05/28/20 83.6 kg (184 lb 4.9 oz)     Temp Readings from Last 3 Encounters:   06/23/20 98 °F (36.7 °C) (Temporal)   05/28/20 96.8 °F (36 °C)    05/17/20 97.9 °F (36.6 °C) (Oral)     BP Readings from Last 3 Encounters:   06/23/20 127/66   06/02/20 106/78   05/28/20 90/60     Pulse Readings from Last 3 Encounters:   06/23/20 (!) 57   06/02/20 64   05/28/20 64       CONSTITUTIONAL  General Appearance: well nourished    MUSCULOSKELETAL  Muscle Strength and Tone: normal strength and tone  Abnormal Involuntary Movements: no abnormal movement noted  Gait and Station: normal gait    PSYCHIATRIC   Level of Consciousness: alert  Orientation: oriented to person, place and time  Grooming: well groomed  Psychomotor Behavior: no restlessness/agitation  Speech: normal in rate, rhythm and volume  Language: normal vocabulary  Mood: anxious at times  Affect: full range and appropriate  Thought Process: logical and goal directed  Associations: intact associations  Thought Content: no SI/HI  Memory: grossly intact  Attention: intact to content of interview  Fund of Knowledge: appears adequate  Insight: good  Judgement: good    MEDICAL DECISION MAKING    DIAGNOSES  Depressive d/o, unspecified    PROBLEM LIST AND MANAGEMENT PLANS    - continue above psychotropic meds  - rtc 3 months     Time with patient: 15 min  More than 50% of the time was spent counseling/coordinating care.  LABORATORY DATA    Lab Visit on 06/22/2020   Component Date Value Ref Range Status    SARS-CoV2 (COVID-19) Qualitative P* 06/22/2020 Not Detected  Not Detected Final    Comment: This test utilizes a real-time reverse transcription  polymerase chain reaction procedure to amplify and   detect the SARS-CoV-2 RdRp and N genes.    The analytical sensitivity (limit of detection) of   this assay is 100 copies/mL.   A Detected result is considered positive for COVID-19.  This patient is considered infected with the   SARS-CoV-2 virus and is presumed to be contagious.    A Not Detected result means that SARS-CoV-2 RNA is not  present above the limit of detection. It does not rule  out the possibility of  COVID-19 and should not be the  sole basis for treatment decisions.  If COVID-19 is   strongly suspected based on clinical and exposure   history,re-testing should be considered.    This test is only for use under Food and Drug   Administration s Emergency Use Authorization (EUA).   Commercial reagents are provided by [employer].  Performance characteristics of the EUA have been   independently verified by [facility]   Department of Pathology and L                           aboratory Medicine.       Lab Visit on 06/10/2020   Component Date Value Ref Range Status    SARS-CoV2 (COVID-19) Qualitative P* 06/10/2020 Not Detected  Not Detected Final    Comment: This test utilizes a real-time reverse transcription  polymerase chain reaction procedure to amplify and   detect the SARS-CoV-2 RdRp and N genes.    The analytical sensitivity (limit of detection) of   this assay is 100 copies/mL.   A Detected result is considered positive for COVID-19.  This patient is considered infected with the   SARS-CoV-2 virus and is presumed to be contagious.    A Not Detected result means that SARS-CoV-2 RNA is not  present above the limit of detection. It does not rule  out the possibility of COVID-19 and should not be the  sole basis for treatment decisions.  If COVID-19 is   strongly suspected based on clinical and exposure   history,re-testing should be considered.    This test is only for use under Food and Drug   Administration s Emergency Use Authorization (EUA).   Commercial reagents are provided by [employer].  Performance characteristics of the EUA have been   independently verified by [facility]   Department of Pathology and L                           aboratory Medicine.       Lab Visit on 06/01/2020   Component Date Value Ref Range Status    Sodium 06/01/2020 144  136 - 145 mmol/L Final    Potassium 06/01/2020 3.8  3.5 - 5.1 mmol/L Final    Chloride 06/01/2020 106  95 - 110  mmol/L Final    CO2 06/01/2020 32* 23 - 29 mmol/L Final    Glucose 06/01/2020 120* 70 - 110 mg/dL Final    BUN, Bld 06/01/2020 11  7 - 17 mg/dL Final    Creatinine 06/01/2020 0.70  0.50 - 1.40 mg/dL Final    Calcium 06/01/2020 9.4  8.7 - 10.5 mg/dL Final    Total Protein 06/01/2020 7.1  6.0 - 8.4 g/dL Final    Albumin 06/01/2020 3.7  3.5 - 5.2 g/dL Final    Total Bilirubin 06/01/2020 0.5  0.1 - 1.0 mg/dL Final    Comment: For infants and newborns, interpretation of results should be based  on gestational age, weight and in agreement with clinical  observations.  Premature Infant recommended reference ranges:  Up to 24 hours.............<8.0 mg/dL  Up to 48 hours............<12.0 mg/dL  3-5 days..................<15.0 mg/dL  6-29 days.................<15.0 mg/dL      Alkaline Phosphatase 06/01/2020 100  38 - 126 U/L Final    AST 06/01/2020 53* 15 - 46 U/L Final    ALT 06/01/2020 20  10 - 44 U/L Final    Anion Gap 06/01/2020 6* 8 - 16 mmol/L Final    eGFR if African American 06/01/2020 >60.0  >60 mL/min/1.73 m^2 Final    eGFR if non African American 06/01/2020 >60.0  >60 mL/min/1.73 m^2 Final    Comment: Calculation used to obtain the estimated glomerular filtration  rate (eGFR) is the CKD-EPI equation.       Cholesterol 06/01/2020 152  120 - 199 mg/dL Final    Comment: The National Cholesterol Education Program (NCEP) has set the  following guidelines (reference ranges) for Cholesterol:  Optimal.....................<200 mg/dL  Borderline High.............200-239 mg/dL  High........................> or = 240 mg/dL      Triglycerides 06/01/2020 100  30 - 150 mg/dL Final    Comment: The National Cholesterol Education Program (NCEP) has set the  following guidelines (reference values) for triglycerides:  Normal......................<150 mg/dL  Borderline High.............150-199 mg/dL  High........................200-499 mg/dL      HDL 06/01/2020 42  40 - 75 mg/dL Final    Comment: The National  Cholesterol Education Program (NCEP) has set the  following guidelines (reference values) for HDL Cholesterol:  Low...............<40 mg/dL  Optimal...........>60 mg/dL      LDL Cholesterol 06/01/2020 90.0  63.0 - 159.0 mg/dL Final    Comment: The National Cholesterol Education Program (NCEP) has set the  following guidelines (reference values) for LDL Cholesterol:  Optimal.......................<130 mg/dL  Borderline High...............130-159 mg/dL  High..........................160-189 mg/dL  Very High.....................>190 mg/dL      Hdl/Cholesterol Ratio 06/01/2020 27.6  20.0 - 50.0 % Final    Total Cholesterol/HDL Ratio 06/01/2020 3.6  2.0 - 5.0 Final    Non-HDL Cholesterol 06/01/2020 110  mg/dL Final    Comment: Risk category and Non-HDL cholesterol goals:  Coronary heart disease (CHD)or equivalent (10-year risk of CHD >20%):  Non-HDL cholesterol goal     <130 mg/dL  Two or more CHD risk factors and 10-year risk of CHD <= 20%:  Non-HDL cholesterol goal     <160 mg/dL  0 to 1 CHD risk factor:  Non-HDL cholesterol goal     <190 mg/dL     Lab Visit on 05/28/2020   Component Date Value Ref Range Status    Fecal Immunochemical Test (iFOBT) 06/02/2020 Negative  Negative Final   Lab Visit on 05/19/2020   Component Date Value Ref Range Status    WBC 05/19/2020 4.46  3.90 - 12.70 K/uL Final    RBC 05/19/2020 4.17  4.00 - 5.40 M/uL Final    Hemoglobin 05/19/2020 11.6* 12.0 - 16.0 g/dL Final    Hematocrit 05/19/2020 37.2  37.0 - 48.5 % Final    Mean Corpuscular Volume 05/19/2020 89  82 - 98 fL Final    Mean Corpuscular Hemoglobin 05/19/2020 27.8  27.0 - 31.0 pg Final    Mean Corpuscular Hemoglobin Conc 05/19/2020 31.2* 32.0 - 36.0 g/dL Final    RDW 05/19/2020 14.1  11.5 - 14.5 % Final    Platelets 05/19/2020 174  150 - 350 K/uL Final    MPV 05/19/2020 10.6  9.2 - 12.9 fL Final    Immature Granulocytes 05/19/2020 0.2  0.0 - 0.5 % Final    Gran # (ANC) 05/19/2020 2.9  1.8 - 7.7 K/uL Final    Immature  Grans (Abs) 05/19/2020 0.01  0.00 - 0.04 K/uL Final    Comment: Mild elevation in immature granulocytes is non specific and   can be seen in a variety of conditions including stress response,   acute inflammation, trauma and pregnancy. Correlation with other   laboratory and clinical findings is essential.      Lymph # 05/19/2020 1.0  1.0 - 4.8 K/uL Final    Mono # 05/19/2020 0.4  0.3 - 1.0 K/uL Final    Eos # 05/19/2020 0.2  0.0 - 0.5 K/uL Final    Baso # 05/19/2020 0.02  0.00 - 0.20 K/uL Final    nRBC 05/19/2020 0  0 /100 WBC Final    Gran% 05/19/2020 65.1  38.0 - 73.0 % Final    Lymph% 05/19/2020 21.5  18.0 - 48.0 % Final    Mono% 05/19/2020 9.4  4.0 - 15.0 % Final    Eosinophil% 05/19/2020 3.4  0.0 - 8.0 % Final    Basophil% 05/19/2020 0.4  0.0 - 1.9 % Final    Differential Method 05/19/2020 Automated   Final    Sodium 05/19/2020 139  136 - 145 mmol/L Final    Potassium 05/19/2020 4.2  3.5 - 5.1 mmol/L Final    Chloride 05/19/2020 101  95 - 110 mmol/L Final    CO2 05/19/2020 30* 23 - 29 mmol/L Final    Glucose 05/19/2020 91  70 - 110 mg/dL Final    BUN, Bld 05/19/2020 13  7 - 17 mg/dL Final    Creatinine 05/19/2020 0.72  0.50 - 1.40 mg/dL Final    Calcium 05/19/2020 9.1  8.7 - 10.5 mg/dL Final    Total Protein 05/19/2020 7.0  6.0 - 8.4 g/dL Final    Albumin 05/19/2020 3.9  3.5 - 5.2 g/dL Final    Total Bilirubin 05/19/2020 0.6  0.1 - 1.0 mg/dL Final    Comment: For infants and newborns, interpretation of results should be based  on gestational age, weight and in agreement with clinical  observations.  Premature Infant recommended reference ranges:  Up to 24 hours.............<8.0 mg/dL  Up to 48 hours............<12.0 mg/dL  3-5 days..................<15.0 mg/dL  6-29 days.................<15.0 mg/dL      Alkaline Phosphatase 05/19/2020 91  38 - 126 U/L Final    AST 05/19/2020 42  15 - 46 U/L Final    ALT 05/19/2020 29  10 - 44 U/L Final    Anion Gap 05/19/2020 8  8 - 16 mmol/L Final     eGFR if African American 05/19/2020 >60.0  >60 mL/min/1.73 m^2 Final    eGFR if non African American 05/19/2020 >60.0  >60 mL/min/1.73 m^2 Final    Comment: Calculation used to obtain the estimated glomerular filtration  rate (eGFR) is the CKD-EPI equation.       CPK 05/19/2020 38* 55 - 170 U/L Final   No results displayed because visit has over 200 results.      Appointment on 05/05/2020   Component Date Value Ref Range Status    Urine Culture, Routine 05/05/2020 *  Corrected                    Value:METHICILLIN RESISTANT STAPHYLOCOCCUS AUREUS  >100,000cfu/ml  No other significant isolate             Juan A Madera

## 2020-08-07 ENCOUNTER — DOCUMENTATION ONLY (OUTPATIENT)
Dept: REHABILITATION | Facility: HOSPITAL | Age: 65
End: 2020-08-07

## 2020-08-07 NOTE — PROGRESS NOTES
Pt was No Show for 2 consecutive sessions 8/5/20 and 8/7/20 without any contact to department. Pt does not have any additional visits scheduled.  She has not been seen by PT since 7/8/20.  Will await contact or consider Discharged if no return.

## 2020-08-13 ENCOUNTER — PATIENT OUTREACH (OUTPATIENT)
Dept: ADMINISTRATIVE | Facility: OTHER | Age: 65
End: 2020-08-13

## 2020-08-13 NOTE — PROGRESS NOTES
LINKS immunization registry updated  Care Everywhere updated  Health Maintenance updated  Chart reviewed for overdue Proactive [facility] health maintenance testing  Chart/Media reviewed for: N/A   Orders entered:N/A

## 2020-08-14 ENCOUNTER — OFFICE VISIT (OUTPATIENT)
Dept: GASTROENTEROLOGY | Facility: CLINIC | Age: 65
End: 2020-08-14
Payer: MEDICARE

## 2020-08-14 VITALS — HEIGHT: 64 IN | BODY MASS INDEX: 29.37 KG/M2 | WEIGHT: 172 LBS

## 2020-08-14 DIAGNOSIS — R13.19 ESOPHAGEAL DYSPHAGIA: ICD-10-CM

## 2020-08-14 DIAGNOSIS — D64.9 LOW HEMOGLOBIN: ICD-10-CM

## 2020-08-14 DIAGNOSIS — R19.7 DIARRHEA, UNSPECIFIED TYPE: Primary | ICD-10-CM

## 2020-08-14 PROCEDURE — 3008F PR BODY MASS INDEX (BMI) DOCUMENTED: ICD-10-PCS | Mod: HCNC,CPTII,95, | Performed by: INTERNAL MEDICINE

## 2020-08-14 PROCEDURE — 99442 PR PHYSICIAN TELEPHONE EVALUATION 11-20 MIN: CPT | Mod: HCNC,95,, | Performed by: INTERNAL MEDICINE

## 2020-08-14 PROCEDURE — 99442 PR PHYSICIAN TELEPHONE EVALUATION 11-20 MIN: ICD-10-PCS | Mod: HCNC,95,, | Performed by: INTERNAL MEDICINE

## 2020-08-14 PROCEDURE — 3008F BODY MASS INDEX DOCD: CPT | Mod: HCNC,CPTII,95, | Performed by: INTERNAL MEDICINE

## 2020-08-14 NOTE — Clinical Note
Piper please order fasting blood work  Please call patient on Monday to schedule fasting blood work next week orders placed.

## 2020-08-14 NOTE — PROGRESS NOTES
Established Patient - Audio Only Telehealth Visit     The patient location is:  At home  The chief complaint leading to consultation is:  Diarrhea for 4 days  Visit type: Virtual visit with audio only (telephone)  Total time spent with patient: 15 minutes       The reason for the audio only service rather than synchronous audio and video virtual visit was related to technical difficulties or patient preference/necessity.     Each patient to whom I provide medical services by telemedicine is:  (1) informed of the relationship between the physician and patient and the respective role of any other health care provider with respect to management of the patient; and (2) notified that they may decline to receive medical services by telemedicine and may withdraw from such care at any time. Patient verbally consented to receive this service via voice-only telephone call.           [facility] Consultation Note    Reason for Consult:  The encounter diagnosis was Diarrhea, unspecified type.    PCP:   Mesfin Hodges Ii       Referring MD:  No referring provider defined for this encounter.    Initial History of Present Illness (HPI):  This is a 64 y.o. female here for evaluation of of iron deficiency intermittent solid food dysphagia.  Patient had an EGD and a colonoscopy ordered but she has declined to have them she says she does not feel like she needs them now she does want have me either with COVID out there.  She said she had 3 or 4 days worth of diarrhea but that has resolved today.  She had looks like she did have some antibiotics back in June but she says she is feeling fine she does not want to do a stool study currently.  She says of her diarrhea return she will let us know she is willing have follow-up labs though.  Overall she is doing well she has at home doing crafts with her .  No blood in her stool no throwing up blood having regular bowel movements.    Abdominal pain - no  Reflux -  no  Dysphagia - no   Bowel habits - normal  GI bleeding - none  NSAID usage - asa    Interval HPI 08/14/2020:  The patient's last visit with me was on 4/21/2020.      ROS:  Constitutional: No fevers, chills, No weight loss  ENT:  No heartburn occasional dysphagia no odynophagia no hoarseness  CV: No chest pain, no palpitation  Pulm: No cough, No shortness of breath, no wheezing  Ophtho: No vision changes  GI: see HPI  Derm: No rash, no itching  Heme: No lymphadenopathy, No easy bruising  MSK:  Positive for arthritis  : No dysuria, No hematuria recent Botox  Endo: No hot or cold intolerance  Neuro: No syncope, No seizure, no strokes  Psych: No uncontrolled anxiety, No uncontrolled depression    Medical History:  has a past medical history of Anticoagulant long-term use, Anxiety, Arthritis, Bilateral lower extremity edema, Carotid artery occlusion, Cataract, Coronary artery disease, Depression, Fever blister, Hypothyroid, Iron deficiency anemia, Lumbar radiculopathy, Ocular migraine, and Sleep apnea.    Surgical History:  has a past surgical history that includes Hysterectomy (1975); Back surgery; pain pump placement; Spine surgery (5-13-13); Cataract extraction w/  intraocular lens implant (Left); Spinal cord stimulator removal; Esophagogastroduodenoscopy (N/A, 5/23/2018); Tonsillectomy; Adenoidectomy; Cardiac catheterization (2016); Cystoscopic litholapaxy (N/A, 6/27/2019); Cystoscopic litholapaxy (N/A, 9/3/2019); and Replacement of catheter (N/A, 10/31/2019).    Family History: family history includes Cancer in her father; Cancer (age of onset: 55) in her mother; Cirrhosis in her paternal aunt and paternal uncle; Colon cancer in her maternal uncle; Esophageal cancer in her father; Heart disease in her maternal uncle; Liver disease in her paternal aunt and paternal uncle; No Known Problems in her brother, brother, maternal aunt, maternal grandfather, paternal grandfather, paternal grandmother, and sister;  Parkinsonism in her maternal grandmother; Tremor in her maternal grandmother..     Social History:  reports that she has never smoked. She has never used smokeless tobacco. She reports that she does not drink alcohol or use drugs.    Review of patient's allergies indicates:   Allergen Reactions    (d)-limonene flavor      Other reaction(s): difficult intubation  Other reaction(s): Difficulty breathing    Bactrim [sulfamethoxazole-trimethoprim] Anaphylaxis    Benadryl [diphenhydramine hcl] Shortness Of Breath    Imitrex [sumatriptan succinate] Shortness Of Breath    Topamax [topiramate] Shortness Of Breath    Vancomycin Shortness Of Breath     Rash    Butorphanol tartrate     Darvocet a500 [propoxyphene n-acetaminophen]      Other reaction(s): Difficulty breathing    Fentanyl      Other reaction(s): Vomiting  Other reaction(s): Nausea  Other reaction(s): Itching  swelling    White petrolatum-zinc     Zinc oxide-white petrolatum      Other reaction(s): Difficulty breathing    Latex, natural rubber Itching and Rash    Phenytoin Rash and Other (See Comments)     Trouble breathing       Medication List with Changes/Refills   Current Medications    ASCORBIC ACID, VITAMIN C, (VITAMIN C) 500 MG TABLET    Take 500 mg by mouth once daily.    ASPIRIN (ECOTRIN) 81 MG EC TABLET    Take 1 tablet (81 mg total) by mouth once daily.    ATORVASTATIN (LIPITOR) 80 MG TABLET    TAKE 1 TABLET BY MOUTH DAILY    BUTALBITAL-ACETAMINOPHEN-CAFFEINE -40 MG (FIORICET, ESGIC) -40 MG PER TABLET    Take 1 tablet by mouth every 4 (four) hours as needed for Headaches.     FLUOXETINE 20 MG CAPSULE    Take 3 capsules (60 mg total) by mouth once daily.    FUROSEMIDE (LASIX) 40 MG TABLET    Take 2 tablets every morning and one every evening    GABAPENTIN (NEURONTIN) 100 MG CAPSULE    Take 1 capsule (100 mg total) by mouth 3 (three) times daily.    HYDROCODONE-ACETAMINOPHEN (NORCO)  MG PER TABLET    Take 1 tablet by mouth  every 6 (six) hours as needed for Pain.    INTRATHECAL PAIN PUMP COMPOUND    Hydromorphone (7.5 mg/mL) infusion at 3.6799 mg/day (0.1533 mg/hr) out of a total reservoir volume of 37.3 mL  Pump filled every 2 months    LEVOTHYROXINE (SYNTHROID) 125 MCG TABLET    Take 1 tablet (125 mcg total) by mouth before breakfast.    OXYBUTYNIN (DITROPAN XL) 15 MG TR24    Take 1 tablet (15 mg total) by mouth once daily.    PANTOPRAZOLE (PROTONIX) 40 MG TABLET    Take 1 tablet (40 mg total) by mouth once daily.    POLYETHYLENE GLYCOL (GLYCOLAX) 17 GRAM PWPK    Take 17 g by mouth once daily.    POTASSIUM CHLORIDE (MICRO-K) 10 MEQ CPSR    Take 2 capsules (20 mEq total) by mouth once daily. for 90 doses    PROMETHAZINE (PHENERGAN) 25 MG TABLET    Take 25 mg by mouth every 6 (six) hours as needed for Nausea.    QUETIAPINE (SEROQUEL) 100 MG TAB    TAKE 1 TABLET (100 MG TOTAL) BY MOUTH EVERY EVENING.    QUETIAPINE (SEROQUEL) 25 MG TAB    Take 12.5-25 mg twice daily as needed for anxiety    SENNA (SENNA LAX) 8.6 MG TABLET    Take 2 tablets by mouth 2 (two) times daily.    TRAZODONE (DESYREL) 100 MG TABLET    Take 3 tablets (300 mg total) by mouth every evening.         Objective Findings:    Vital Signs:  LMP  (LMP Unknown)   There is no height or weight on file to calculate BMI.    Physical Exam:  Telemedicine telephone visit patient does not have the ability for video visit  Neurologic:  Alert and oriented x4  Psychiatric:  Normal speech mentation and affect    Labs:  Lab Results   Component Value Date    WBC 4.46 05/19/2020    HGB 11.6 (L) 05/19/2020    HCT 37.2 05/19/2020     05/19/2020    CHOL 152 06/01/2020    TRIG 100 06/01/2020    HDL 42 06/01/2020    ALT 20 06/01/2020    AST 53 (H) 06/01/2020     06/01/2020    K 3.8 06/01/2020     06/01/2020    CREATININE 0.70 06/01/2020    BUN 11 06/01/2020    CO2 32 (H) 06/01/2020    TSH 1.624 05/17/2020    INR 0.9 05/27/2014    HGBA1C 5.4 08/25/2016               Medical  Decision Making:  Prior labs reviewed  Repeat fasting labs discussed with patient  EGD and colonoscopy talk given patient has declined EGD and colonoscopy  Prior EGD and images personally reviewed by myself      Assessment:  1. Diarrhea, unspecified type    2.  Intermittent solid food dysphagia     Recommendations:   1.  Patient's diarrhea stopped yesterday she will let us know if it returns if so we recommend stool studies.  She will call us.  2.  History of iron deficiency anemia will recommend patient, get repeat labs EGD and colonoscopy was ordered but patient has declined them she does not want those anymore risks benefits alternatives explained to patient.  3.  Intermittent solid food dysphagia patient says she is doing fine now not interested in EGD either  4.  Return GI clinic p.r.n.    No follow-ups on file.      Order summary:         Thank you so much for allowing me to participate in the care of Tasha Vance MD

## 2020-08-25 ENCOUNTER — TELEPHONE (OUTPATIENT)
Dept: INTERNAL MEDICINE | Facility: CLINIC | Age: 65
End: 2020-08-25

## 2020-08-25 ENCOUNTER — TELEPHONE (OUTPATIENT)
Dept: UROLOGY | Facility: CLINIC | Age: 65
End: 2020-08-25

## 2020-08-25 DIAGNOSIS — N39.0 RECURRENT UTI: Primary | ICD-10-CM

## 2020-08-25 NOTE — TELEPHONE ENCOUNTER
----- Message from Nandini Blevins sent at 8/25/2020  3:26 PM CDT -----  Contact: [phone]  Patient called to follow up on earlier message. Please advise

## 2020-08-25 NOTE — TELEPHONE ENCOUNTER
----- Message from Elida Cummings sent at 8/25/2020 12:05 PM CDT -----  Liz with [facility]#[phone]     She's calling regarding odor to urine. She has specimen but she needs order for urine culture so that she can drop at [facility].

## 2020-08-25 NOTE — TELEPHONE ENCOUNTER
Returned call to Saray (Osei  nurse), states pt c/o foul odor and usual UTI symptoms (bladder pressure/pain, urgency), sediment and was asking of verbal order to submit UCx. Gave v/o for UCx, asked that I place it to [facility] so we wont have to wait for processing through [employer].

## 2020-08-26 PROCEDURE — G0179 PR HOME HEALTH MD RECERTIFICATION: ICD-10-PCS | Mod: ,,, | Performed by: UROLOGY

## 2020-08-26 PROCEDURE — G0179 MD RECERTIFICATION HHA PT: HCPCS | Mod: ,,, | Performed by: UROLOGY

## 2020-08-31 ENCOUNTER — OFFICE VISIT (OUTPATIENT)
Dept: INTERNAL MEDICINE | Facility: CLINIC | Age: 65
End: 2020-08-31
Payer: MEDICARE

## 2020-08-31 ENCOUNTER — TELEPHONE (OUTPATIENT)
Dept: UROLOGY | Facility: CLINIC | Age: 65
End: 2020-08-31

## 2020-08-31 ENCOUNTER — EXTERNAL HOME HEALTH (OUTPATIENT)
Dept: HOME HEALTH SERVICES | Facility: HOSPITAL | Age: 65
End: 2020-08-31
Payer: MEDICARE

## 2020-08-31 ENCOUNTER — LAB VISIT (OUTPATIENT)
Dept: LAB | Facility: HOSPITAL | Age: 65
End: 2020-08-31
Attending: INTERNAL MEDICINE
Payer: MEDICARE

## 2020-08-31 VITALS
BODY MASS INDEX: 29.02 KG/M2 | HEIGHT: 64 IN | OXYGEN SATURATION: 98 % | SYSTOLIC BLOOD PRESSURE: 90 MMHG | WEIGHT: 170 LBS | DIASTOLIC BLOOD PRESSURE: 60 MMHG | HEART RATE: 74 BPM

## 2020-08-31 DIAGNOSIS — E03.9 PRIMARY HYPOTHYROIDISM: Chronic | ICD-10-CM

## 2020-08-31 DIAGNOSIS — R33.9 URINARY RETENTION: Chronic | ICD-10-CM

## 2020-08-31 DIAGNOSIS — R19.7 DIARRHEA, UNSPECIFIED TYPE: ICD-10-CM

## 2020-08-31 DIAGNOSIS — F41.1 GENERALIZED ANXIETY DISORDER: ICD-10-CM

## 2020-08-31 DIAGNOSIS — G47.01 INSOMNIA DUE TO MEDICAL CONDITION: Chronic | ICD-10-CM

## 2020-08-31 DIAGNOSIS — I89.0 LYMPHEDEMA OF BOTH LOWER EXTREMITIES: Chronic | ICD-10-CM

## 2020-08-31 DIAGNOSIS — N30.90 BLADDER INFECTION: Primary | ICD-10-CM

## 2020-08-31 DIAGNOSIS — D64.9 LOW HEMOGLOBIN: ICD-10-CM

## 2020-08-31 DIAGNOSIS — I25.119 CORONARY ARTERY DISEASE INVOLVING NATIVE CORONARY ARTERY OF NATIVE HEART WITH ANGINA PECTORIS: Primary | ICD-10-CM

## 2020-08-31 DIAGNOSIS — G89.4 CHRONIC PAIN SYNDROME: Chronic | ICD-10-CM

## 2020-08-31 DIAGNOSIS — M79.89 SWELLING OF BOTH LOWER EXTREMITIES: ICD-10-CM

## 2020-08-31 LAB
ALBUMIN SERPL BCP-MCNC: 3.4 G/DL (ref 3.5–5.2)
ALP SERPL-CCNC: 106 U/L (ref 55–135)
ALT SERPL W/O P-5'-P-CCNC: 6 U/L (ref 10–44)
ANION GAP SERPL CALC-SCNC: 13 MMOL/L (ref 8–16)
AST SERPL-CCNC: 9 U/L (ref 10–40)
BASOPHILS # BLD AUTO: 0.01 K/UL (ref 0–0.2)
BASOPHILS NFR BLD: 0.2 % (ref 0–1.9)
BILIRUB DIRECT SERPL-MCNC: 0.2 MG/DL (ref 0.1–0.3)
BILIRUB SERPL-MCNC: 0.4 MG/DL (ref 0.1–1)
BUN SERPL-MCNC: 7 MG/DL (ref 8–23)
CALCIUM SERPL-MCNC: 8.6 MG/DL (ref 8.7–10.5)
CHLORIDE SERPL-SCNC: 100 MMOL/L (ref 95–110)
CO2 SERPL-SCNC: 26 MMOL/L (ref 23–29)
CREAT SERPL-MCNC: 1 MG/DL (ref 0.5–1.4)
DIFFERENTIAL METHOD: ABNORMAL
EOSINOPHIL # BLD AUTO: 0.1 K/UL (ref 0–0.5)
EOSINOPHIL NFR BLD: 2.1 % (ref 0–8)
ERYTHROCYTE [DISTWIDTH] IN BLOOD BY AUTOMATED COUNT: 13.4 % (ref 11.5–14.5)
EST. GFR  (AFRICAN AMERICAN): >60 ML/MIN/1.73 M^2
EST. GFR  (NON AFRICAN AMERICAN): 59.7 ML/MIN/1.73 M^2
FERRITIN SERPL-MCNC: 53 NG/ML (ref 20–300)
GLUCOSE SERPL-MCNC: 171 MG/DL (ref 70–110)
HCT VFR BLD AUTO: 40.3 % (ref 37–48.5)
HGB BLD-MCNC: 12.5 G/DL (ref 12–16)
IGA SERPL-MCNC: 187 MG/DL (ref 40–350)
IMM GRANULOCYTES # BLD AUTO: 0.01 K/UL (ref 0–0.04)
IMM GRANULOCYTES NFR BLD AUTO: 0.2 % (ref 0–0.5)
IRON SERPL-MCNC: 52 UG/DL (ref 30–160)
LYMPHOCYTES # BLD AUTO: 1.2 K/UL (ref 1–4.8)
LYMPHOCYTES NFR BLD: 25.4 % (ref 18–48)
MCH RBC QN AUTO: 27.7 PG (ref 27–31)
MCHC RBC AUTO-ENTMCNC: 31 G/DL (ref 32–36)
MCV RBC AUTO: 89 FL (ref 82–98)
MONOCYTES # BLD AUTO: 0.3 K/UL (ref 0.3–1)
MONOCYTES NFR BLD: 6.7 % (ref 4–15)
NEUTROPHILS # BLD AUTO: 3.2 K/UL (ref 1.8–7.7)
NEUTROPHILS NFR BLD: 65.4 % (ref 38–73)
NRBC BLD-RTO: 0 /100 WBC
PLATELET # BLD AUTO: 259 K/UL (ref 150–350)
PMV BLD AUTO: 10.1 FL (ref 9.2–12.9)
POTASSIUM SERPL-SCNC: 2.9 MMOL/L (ref 3.5–5.1)
PROT SERPL-MCNC: 7 G/DL (ref 6–8.4)
RBC # BLD AUTO: 4.52 M/UL (ref 4–5.4)
SATURATED IRON: 15 % (ref 20–50)
SODIUM SERPL-SCNC: 139 MMOL/L (ref 136–145)
TOTAL IRON BINDING CAPACITY: 345 UG/DL (ref 250–450)
TRANSFERRIN SERPL-MCNC: 233 MG/DL (ref 200–375)
WBC # BLD AUTO: 4.81 K/UL (ref 3.9–12.7)

## 2020-08-31 PROCEDURE — 80048 BASIC METABOLIC PNL TOTAL CA: CPT | Mod: HCNC

## 2020-08-31 PROCEDURE — 82784 ASSAY IGA/IGD/IGG/IGM EACH: CPT | Mod: HCNC

## 2020-08-31 PROCEDURE — 3008F PR BODY MASS INDEX (BMI) DOCUMENTED: ICD-10-PCS | Mod: HCNC,CPTII,S$GLB, | Performed by: INTERNAL MEDICINE

## 2020-08-31 PROCEDURE — 99999 PR PBB SHADOW E&M-EST. PATIENT-LVL V: CPT | Mod: PBBFAC,HCNC,, | Performed by: INTERNAL MEDICINE

## 2020-08-31 PROCEDURE — 99214 PR OFFICE/OUTPT VISIT, EST, LEVL IV, 30-39 MIN: ICD-10-PCS | Mod: HCNC,S$GLB,, | Performed by: INTERNAL MEDICINE

## 2020-08-31 PROCEDURE — 3008F BODY MASS INDEX DOCD: CPT | Mod: HCNC,CPTII,S$GLB, | Performed by: INTERNAL MEDICINE

## 2020-08-31 PROCEDURE — 99214 OFFICE O/P EST MOD 30 MIN: CPT | Mod: HCNC,S$GLB,, | Performed by: INTERNAL MEDICINE

## 2020-08-31 PROCEDURE — 36415 COLL VENOUS BLD VENIPUNCTURE: CPT | Mod: HCNC

## 2020-08-31 PROCEDURE — 80076 HEPATIC FUNCTION PANEL: CPT | Mod: HCNC

## 2020-08-31 PROCEDURE — 99499 RISK ADDL DX/OHS AUDIT: ICD-10-PCS | Mod: HCNC,S$GLB,, | Performed by: INTERNAL MEDICINE

## 2020-08-31 PROCEDURE — 99499 UNLISTED E&M SERVICE: CPT | Mod: HCNC,S$GLB,, | Performed by: INTERNAL MEDICINE

## 2020-08-31 PROCEDURE — 83516 IMMUNOASSAY NONANTIBODY: CPT | Mod: HCNC

## 2020-08-31 PROCEDURE — 85025 COMPLETE CBC W/AUTO DIFF WBC: CPT | Mod: HCNC

## 2020-08-31 PROCEDURE — 82728 ASSAY OF FERRITIN: CPT | Mod: HCNC

## 2020-08-31 PROCEDURE — 83540 ASSAY OF IRON: CPT | Mod: HCNC

## 2020-08-31 PROCEDURE — 99999 PR PBB SHADOW E&M-EST. PATIENT-LVL V: ICD-10-PCS | Mod: PBBFAC,HCNC,, | Performed by: INTERNAL MEDICINE

## 2020-08-31 RX ORDER — NITROGLYCERIN 0.4 MG/1
0.4 TABLET SUBLINGUAL EVERY 5 MIN PRN
Qty: 25 TABLET | Refills: 3 | Status: SHIPPED | OUTPATIENT
Start: 2020-08-31 | End: 2023-03-23 | Stop reason: SDUPTHER

## 2020-08-31 RX ORDER — FUROSEMIDE 40 MG/1
TABLET ORAL
Qty: 270 TABLET | Refills: 3 | Status: SHIPPED | OUTPATIENT
Start: 2020-08-31 | End: 2020-10-23

## 2020-08-31 RX ORDER — DOXYCYCLINE 100 MG/1
100 CAPSULE ORAL 2 TIMES DAILY
Qty: 14 CAPSULE | Refills: 0 | Status: SHIPPED | OUTPATIENT
Start: 2020-08-31 | End: 2020-09-07

## 2020-08-31 NOTE — PROGRESS NOTES
Subjective:       Patient ID: Tasha Hawley is a 65 y.o. female.    Chief Complaint:   Follow-up (3 months)    HPI - several complaints today.  Most importantly, she has been having chest pains, described as central heaviness and pressure.  Uses Isaac sign to describe it and says it's the same as her angina used to be.  Only lasts a couple of minutes.  Doesn't have nitroglycerin at home.  No dyspnea    Also with diarrhea for the past 5 days.  Just took imodium this am.  Says she hasn't been eating anything, just 7-up and water.  She's sleeping a lot during the day since she has had the diarrhea.  She has chronically had constipation and insomnia, so this is a big change for her.      Still with suprapubic catheter; thinks it will be needed permanently.  She is seeing chronic pain doc this week for refill of pain pump.  Cautioned against increasing dose and encouraged her to reduce dose if at all possible.  Says she's taking all meds as prescribed.    PMH:  Chronic insomnia  Chronic low back pain with narcotic use.  Indwelling narcotic pump  History CVA with dysarthria  Neurogenic bladder, with recurrent UTI's. Followed by urogyn (Milena).  Suprapubic catheter  Hypothyroidism, stable  CECI, not on CPAP  CAD, with ACS in Jan 2016 - subtot mid LAD lesion without PCI; ischemic CM with EF 40% and chronic LE edema. [facility]  Anxiety and Depression  Chronic constipation, likely d/t ongoing narcotic use  Intermittent nausea  LE dependent edema     Meds: Reviewed and reconciled in EPIC with patient during visit today.     PMH:  Chronic insomnia; hypersomnia presently  Chronic low back pain with narcotic use.  Indwelling narcotic pump  History CVA with dysarthria  Neurogenic bladder, now with permanent suprapubic catheter  Hypothyroidism, stable  CECI, not on CPAP  CAD, with ACS in Jan 2016 - subtot mid LAD lesion without PCI; ischemic CM with EF 40% and chronic LE edema. Followed by [facility]  cardiology  Anxiety and Depression  Chronic constipation, likely d/t ongoing narcotic use; diarrhea at the [facility]  Intermittent nausea  LE dependent edema     Meds: Reviewed and reconciled in EPIC with patient during visit today.     Review of Systems   Constitutional: Negative for fever.   HENT: Negative for congestion.    Respiratory: Negative for shortness of breath.    Cardiovascular: Positive for chest pain.   Gastrointestinal: Positive for diarrhea.   Genitourinary: Positive for difficulty urinating.   Musculoskeletal: Positive for back pain.   Skin: Negative for rash.   Neurological: Negative for headaches.   Psychiatric/Behavioral: Positive for dysphoric mood and sleep disturbance.       Objective:      Physical Exam  Vitals signs reviewed.   Constitutional:       General: She is not in acute distress.     Appearance: She is well-developed. She is not ill-appearing.      Comments: Debilitated woman examined in wheelchair.  Appears much older than stated age   HENT:      Head: Normocephalic and atraumatic.   Cardiovascular:      Rate and Rhythm: Normal rate and regular rhythm.      Heart sounds: Normal heart sounds. No murmur. No friction rub. No gallop.    Pulmonary:      Effort: Pulmonary effort is normal. No respiratory distress.      Breath sounds: Normal breath sounds. No wheezing or rales.   Chest:      Chest wall: No tenderness.   Musculoskeletal:      Right lower leg: Edema present.      Left lower leg: Edema present.   Skin:     General: Skin is warm and dry.      Findings: No erythema.   Neurological:      Mental Status: She is alert. Mental status is at baseline.   Psychiatric:         Mood and Affect: Mood normal.         Assessment:       1. Coronary artery disease involving native coronary artery of native heart with angina pectoris    2. Diarrhea, unspecified type    3. Lymphedema of both lower extremities    4. Chronic pain syndrome    5. Urinary retention    6. Primary hypothyroidism    7.  "Insomnia due to medical condition    8. Generalized anxiety disorder    9. Swelling of both lower extremities        Plan:       Tasha was seen today for follow-up.    Diagnoses and all orders for this visit:    Coronary artery disease involving native coronary artery of native heart with angina pectoris - will restart ntg.  Asked her to contact her cardiologist asap; put in a referral today to speed the evaluation.  Do not feel this warrants ER evaluation  -     nitroGLYCERIN (NITROSTAT) 0.4 MG SL tablet; Place 1 tablet (0.4 mg total) under the tongue every 5 (five) minutes as needed for Chest pain.  -     Ambulatory referral/consult to Cardiology; Future    Diarrhea, unspecified type - discussed use of imodium as needed.  Do not take too much, though    Lymphedema of both lower extremities - refilling lasix  -     furosemide (LASIX) 40 MG tablet; Take 2 tablets every morning and one every evening    Chronic pain syndrome - stable today    Urinary retention - stable with SP catheter    Primary hypothyroidism - stable on synthroid    Insomnia due to medical condition - now with hypersomnolence.  Encouraged her to take quetiapine and trazodone only "as needed" r/t scheduled dose    Generalized anxiety disorder    Swelling of both lower extremities - less than usual    rtc prn, or in 3 months    PAPO Hodges MD MPH  Staff Internist               "

## 2020-08-31 NOTE — TELEPHONE ENCOUNTER
----- Message from Karoline Land MA sent at 8/31/2020  3:19 PM CDT -----  Pt states that the home health nurse told her that she has a bad infection  and pt wanted to know if she could get IV antibiotics instead of [id number]

## 2020-09-02 ENCOUNTER — DOCUMENTATION ONLY (OUTPATIENT)
Dept: REHABILITATION | Facility: HOSPITAL | Age: 65
End: 2020-09-02

## 2020-09-02 NOTE — PROGRESS NOTES
Outpatient Therapy Discharge Summary     Name: Tasha Hawley  [facility] Number: [id number]     Therapy Diagnosis:        Encounter Diagnoses   Name Primary?    Swelling of both lower extremities      Impaired functional mobility and activity tolerance      Lymphedema of both lower extremities Yes     Physician: Kalpesh Haney MD*     Visit Date: 7/8/2020         Physician Orders: PT Eval and Treat lymphedema  Medical Diagnosis from Referral:   Diagnosis   I89.0 (ICD-10-CM) - Lymphedema of both lower extremities         Evaluation Date: 6/22/2020  Authorization Period Expiration: 12/22/20  Plan of Care Expiration: 9/14/20  Visit # / Visits authorized: 3/ 30     Precautions: Standard, Fall, CHF and 12 spinal surgeries, pain pump, suprapubic catheter    Date of Last visit: 7/8/20  Total Visits Received: 3  Cancelled Visits: 1  No Show Visits: 3    Pt with 3 no show sessions and one cancel due to transportation.     Pt has not returned to therapy or contacted department to resume services.    Dr. Haney wrote orders for her compression needs 7/8/20.      Assessment    Goals: not formally assessed or achieved due to limited attendance.    Discharge reason: Patient has not attended therapy since 7/8/20.    Plan   This patient is discharged from Physical Therapy

## 2020-09-03 ENCOUNTER — DOCUMENT SCAN (OUTPATIENT)
Dept: HOME HEALTH SERVICES | Facility: HOSPITAL | Age: 65
End: 2020-09-03
Payer: MEDICAID

## 2020-09-03 LAB — TTG IGA SER-ACNC: 9 UNITS

## 2020-09-04 ENCOUNTER — OFFICE VISIT (OUTPATIENT)
Dept: CARDIOLOGY | Facility: CLINIC | Age: 65
End: 2020-09-04
Payer: MEDICARE

## 2020-09-04 ENCOUNTER — TELEPHONE (OUTPATIENT)
Dept: CARDIOLOGY | Facility: CLINIC | Age: 65
End: 2020-09-04

## 2020-09-04 ENCOUNTER — TELEPHONE (OUTPATIENT)
Dept: UROLOGY | Facility: CLINIC | Age: 65
End: 2020-09-04

## 2020-09-04 VITALS
BODY MASS INDEX: 28.83 KG/M2 | DIASTOLIC BLOOD PRESSURE: 55 MMHG | HEIGHT: 65 IN | SYSTOLIC BLOOD PRESSURE: 110 MMHG | HEART RATE: 51 BPM | WEIGHT: 173.06 LBS

## 2020-09-04 DIAGNOSIS — E78.00 PURE HYPERCHOLESTEROLEMIA: ICD-10-CM

## 2020-09-04 DIAGNOSIS — I50.32 CHRONIC DIASTOLIC HEART FAILURE: ICD-10-CM

## 2020-09-04 DIAGNOSIS — I65.23 BILATERAL CAROTID ARTERY STENOSIS: ICD-10-CM

## 2020-09-04 DIAGNOSIS — I70.0 THORACIC AORTA ATHEROSCLEROSIS: ICD-10-CM

## 2020-09-04 DIAGNOSIS — I25.10 CORONARY ARTERY DISEASE INVOLVING NATIVE CORONARY ARTERY OF NATIVE HEART WITHOUT ANGINA PECTORIS: Primary | Chronic | ICD-10-CM

## 2020-09-04 DIAGNOSIS — R00.2 PALPITATION: ICD-10-CM

## 2020-09-04 DIAGNOSIS — R00.2 PALPITATION: Primary | ICD-10-CM

## 2020-09-04 DIAGNOSIS — I87.2 CHRONIC VENOUS INSUFFICIENCY: ICD-10-CM

## 2020-09-04 DIAGNOSIS — I25.5 ISCHEMIC CARDIOMYOPATHY: Chronic | ICD-10-CM

## 2020-09-04 PROCEDURE — 99214 OFFICE O/P EST MOD 30 MIN: CPT | Mod: HCNC,25,S$GLB, | Performed by: PHYSICIAN ASSISTANT

## 2020-09-04 PROCEDURE — 99999 PR PBB SHADOW E&M-EST. PATIENT-LVL V: ICD-10-PCS | Mod: PBBFAC,HCNC,, | Performed by: PHYSICIAN ASSISTANT

## 2020-09-04 PROCEDURE — 3008F PR BODY MASS INDEX (BMI) DOCUMENTED: ICD-10-PCS | Mod: HCNC,CPTII,S$GLB, | Performed by: PHYSICIAN ASSISTANT

## 2020-09-04 PROCEDURE — 93000 ELECTROCARDIOGRAM COMPLETE: CPT | Mod: HCNC,S$GLB,, | Performed by: INTERNAL MEDICINE

## 2020-09-04 PROCEDURE — 93000 EKG 12-LEAD: ICD-10-PCS | Mod: HCNC,S$GLB,, | Performed by: INTERNAL MEDICINE

## 2020-09-04 PROCEDURE — 3008F BODY MASS INDEX DOCD: CPT | Mod: HCNC,CPTII,S$GLB, | Performed by: PHYSICIAN ASSISTANT

## 2020-09-04 PROCEDURE — 99999 PR PBB SHADOW E&M-EST. PATIENT-LVL V: CPT | Mod: PBBFAC,HCNC,, | Performed by: PHYSICIAN ASSISTANT

## 2020-09-04 PROCEDURE — 99499 RISK ADDL DX/OHS AUDIT: ICD-10-PCS | Mod: HCNC,S$GLB,, | Performed by: PHYSICIAN ASSISTANT

## 2020-09-04 PROCEDURE — 99214 PR OFFICE/OUTPT VISIT, EST, LEVL IV, 30-39 MIN: ICD-10-PCS | Mod: HCNC,25,S$GLB, | Performed by: PHYSICIAN ASSISTANT

## 2020-09-04 PROCEDURE — 99499 UNLISTED E&M SERVICE: CPT | Mod: HCNC,S$GLB,, | Performed by: PHYSICIAN ASSISTANT

## 2020-09-04 NOTE — PROGRESS NOTES
"    General Cardiology Clinic Note  Reason for Visit: Chest pain  Last Clinic Visit: 1/16/2020 with Dr. Plascencia    HPI:   Ms. Tasha Hawley is a 65 y.o. woman with history of mid LAD subtotal occlusion (1/7/2016-good collateral flow from RCA, unamenable to PCI with small caliber distal vessel-LHC done for NSTEMI), neurog enic bladder with chronic suprapubic catheter, Ischemic cardiomyopathy (EF recovered to 55%), chronic pain syndrome with spinal cord stimulator, chronic hypotension, chronic lower extremity edema who presents to clinic c/o chest pain x 2-3 weeks.     The chest pain is located under her left breast and into her left axilla and LUQ. She describes it as a "throbbing" pain that is episodic. It lasts anywhere from 15 seconds up to 10 minutes. It is unrelated to physical exertion and occurs multiple times a day. If she pushes on the area, it makes it worse. She also complains of headache and fatigue. She saw her PCP a few days ago and was prescribed Nitroglycerin, but has not tried using it yet.  She denies shortness of breath, PND, orthopnea, palpitations,  or syncope    EKG: Sinus bradycardia. No acute STT changes.   ROS:    Constitution: Negative for decreased appetite, diaphoresis, fever, weight gain and weight loss. Positive for fatigue  HENT: Negative for congestion, nosebleeds and sore throat.    Eyes: Negative for blurred vision, vision loss in left eye, vision loss in right eye and visual disturbance.  Cardiovascular: Negative for claudication, dyspnea on exertion, near-syncope, orthopnea, palpitations, paroxysmal nocturnal dyspnea and syncope. Positive for chest pain and leg swelling  Respiratory: Negative for cough, hemoptysis, snoring, shortness of breath and wheezing..   Hematologic/Lymphatic: Negative for bleeding problem. Does not bruise/bleed easily.   Endocrine: Negative for polyuria  Musculoskeletal:  Positive for back pain and arthritis  Gastrointestinal: Negative for abdominal " pain, change in bowel habit, diarrhea, heartburn, hematemesis, hematochezia, melena, nausea and vomiting.   Genitourinary: Positive for bladder incontinence. Negative for hematuria and nocturia.  Neurological: Negative for extremity weakness or numbness, dizziness, and light-headedness. Positive for headaches   Psychiatric/Behavioral: Negative for depression.   Allergic/Immunologic: Negative for hives.   PMH:     Past Medical History:   Diagnosis Date    Anticoagulant long-term use     Anxiety     Arthritis     Bilateral lower extremity edema     severe chronic    Carotid artery occlusion     Cataract     Coronary artery disease     subtotalled LAD with collateral    Depression     Fever blister     Hypothyroid     Iron deficiency anemia     Lumbar radiculopathy     with chronic pain    Ocular migraine     Sleep apnea     cpap     Past Surgical History:   Procedure Laterality Date    ADENOIDECTOMY      BACK SURGERY      x 12    CARDIAC CATHETERIZATION  2016    subtotalled LAD with right to left collaterals    CATARACT EXTRACTION W/  INTRAOCULAR LENS IMPLANT Left     Dr Coleman     CYSTOSCOPIC LITHOLAPAXY N/A 6/27/2019    Procedure: CYSTOLITHOLAPAXY;  Surgeon: Shireen Mayo MD;  Location: [facility] OR [address] [place] [address];  Service: Urology;  Laterality: N/A;    CYSTOSCOPIC LITHOLAPAXY N/A 9/3/2019    Procedure: CYSTOLITHOLAPAXY;  Surgeon: Shireen Mayo MD;  Location: [facility] OR [address] [place] [address];  Service: Urology;  Laterality: N/A;    ESOPHAGOGASTRODUODENOSCOPY N/A 5/23/2018    Procedure: ESOPHAGOGASTRODUODENOSCOPY (EGD);  Surgeon: Prince Vance MD;  [facility]);  Service: Endoscopy;  Laterality: N/A;  r/s 'd per Dr. Vance due to family emergency- ER    HYSTERECTOMY  1975    endometriosis    pain pump placement      SQ Dilaudid Pump managed by Dr. Hillman, Pain Management    REPLACEMENT OF CATHETER N/A 10/31/2019    Procedure: REPLACEMENT, CATHETER-SUPRAPUBIC;  Surgeon: Shireen Mayo,  MD;  Location: [facility] OR [address] [place] [address];  Service: Urology;  Laterality: N/A;    SPINAL CORD STIMULATOR REMOVAL      before [place]    SPINE SURGERY  5-13-13    CERVICAL FUSION    TONSILLECTOMY       Allergies:     Review of patient's allergies indicates:   Allergen Reactions    (d)-limonene flavor      Other reaction(s): difficult intubation  Other reaction(s): Difficulty breathing    Bactrim [sulfamethoxazole-trimethoprim] Anaphylaxis    Benadryl [diphenhydramine hcl] Shortness Of Breath    Imitrex [sumatriptan succinate] Shortness Of Breath    Topamax [topiramate] Shortness Of Breath    Vancomycin Shortness Of Breath     Rash    Butorphanol tartrate     Darvocet a500 [propoxyphene n-acetaminophen]      Other reaction(s): Difficulty breathing    Fentanyl      Other reaction(s): Vomiting  Other reaction(s): Nausea  Other reaction(s): Itching  swelling    White petrolatum-zinc     Zinc oxide-white petrolatum      Other reaction(s): Difficulty breathing    Latex, natural rubber Itching and Rash    Phenytoin Rash and Other (See Comments)     Trouble breathing     Medications:     Current Outpatient Medications on File Prior to Visit   Medication Sig Dispense Refill    ascorbic acid, vitamin C, (VITAMIN C) 500 MG tablet Take 500 mg by mouth once daily.      aspirin (ECOTRIN) 81 MG EC tablet Take 1 tablet (81 mg total) by mouth once daily.  0    atorvastatin (LIPITOR) 80 MG tablet TAKE 1 TABLET BY MOUTH DAILY 90 tablet 3    butalbital-acetaminophen-caffeine -40 mg (FIORICET, ESGIC) -40 mg per tablet Take 1 tablet by mouth every 4 (four) hours as needed for Headaches.       doxycycline (MONODOX) 100 MG capsule Take 1 capsule (100 mg total) by mouth 2 (two) times daily. Use sunscreen or cover and continue 1 week after completed. for 7 days 14 capsule 0    FLUoxetine 20 MG capsule Take 3 capsules (60 mg total) by mouth once daily. 270 capsule 1    furosemide (LASIX) 40 MG tablet Take 2 tablets  every morning and one every evening 270 tablet 3    gabapentin (NEURONTIN) 100 MG capsule Take 1 capsule (100 mg total) by mouth 3 (three) times daily. 90 capsule 2    HYDROcodone-acetaminophen (NORCO)  mg per tablet Take 1 tablet by mouth every 6 (six) hours as needed for Pain. 15 tablet 0    intrathecal pain pump compound Hydromorphone (7.5 mg/mL) infusion at 3.6799 mg/day (0.1533 mg/hr) out of a total reservoir volume of 37.3 mL  Pump filled every 2 months      levothyroxine (SYNTHROID) 125 MCG tablet Take 1 tablet (125 mcg total) by mouth before breakfast. 90 tablet 3    nitroGLYCERIN (NITROSTAT) 0.4 MG SL tablet Place 1 tablet (0.4 mg total) under the tongue every 5 (five) minutes as needed for Chest pain. 25 tablet 3    oxybutynin (DITROPAN XL) 15 MG TR24 Take 1 tablet (15 mg total) by mouth once daily. 90 tablet 3    pantoprazole (PROTONIX) 40 MG tablet Take 1 tablet (40 mg total) by mouth once daily. 90 tablet 3    promethazine (PHENERGAN) 25 MG tablet Take 25 mg by mouth every 6 (six) hours as needed for Nausea.      QUEtiapine (SEROQUEL) 100 MG Tab TAKE 1 TABLET (100 MG TOTAL) BY MOUTH EVERY EVENING. 90 tablet 1    senna (SENNA LAX) 8.6 mg tablet Take 2 tablets by mouth 2 (two) times daily.      traZODone (DESYREL) 100 MG tablet Take 3 tablets (300 mg total) by mouth every evening. 270 tablet 1    [DISCONTINUED] lamotrigine (LAMICTAL) 25 MG tablet Take 1 tablet (25 mg total) by mouth once daily. 30 tablet 6     No current facility-administered medications on file prior to visit.      Social History:     Social History     Tobacco Use    Smoking status: Never Smoker    Smokeless tobacco: Never Used   Substance Use Topics    Alcohol use: Never     Frequency: Never     Family History:     Family History   Problem Relation Age of Onset    Cancer Mother 55        breast    Cancer Father         esophagus,had laryngectomy    Esophageal cancer Father     Parkinsonism Maternal Grandmother   "   Tremor Maternal Grandmother     No Known Problems Brother     No Known Problems Brother     Heart disease Maternal Uncle     Colon cancer Maternal Uncle         Less than 60    No Known Problems Sister     No Known Problems Maternal Aunt     Cirrhosis Paternal Aunt         ETOH    Liver disease Paternal Aunt         ETOH    Liver disease Paternal Uncle         ETOH    Cirrhosis Paternal Uncle         ETOH    No Known Problems Maternal Grandfather     No Known Problems Paternal Grandmother     No Known Problems Paternal Grandfather     Melanoma Neg Hx     Amblyopia Neg Hx     Blindness Neg Hx     Cataracts Neg Hx     Diabetes Neg Hx     Glaucoma Neg Hx     Hypertension Neg Hx     Macular degeneration Neg Hx     Retinal detachment Neg Hx     Strabismus Neg Hx     Stroke Neg Hx     Thyroid disease Neg Hx     Celiac disease Neg Hx     Colon polyps Neg Hx     Cystic fibrosis Neg Hx     Crohn's disease Neg Hx     Inflammatory bowel disease Neg Hx     Liver cancer Neg Hx     Rectal cancer Neg Hx     Stomach cancer Neg Hx     Ulcerative colitis Neg Hx     Lymphoma Neg Hx      Physical Exam:   BP (!) 110/55   Pulse (!) 51   Ht 5' 5" (1.651 m)   Wt 78.5 kg (173 lb 1 oz)   LMP  (LMP Unknown)   BMI 28.80 kg/m²    EKG obtained in room while patient having active chest pain - Sinus riley with no acute ischemic changes  Constitutional: She is oriented to person, place, and time. She appears well-developed and well-nourished.   Examined in chair due to inability to get up onto the exam table.  HENT:   Head: Normocephalic and atraumatic.   Mouth/Throat: Oropharynx is clear and moist.   Eyes: Pupils are equal, round, and reactive to light. Conjunctivae and EOM are normal. No scleral icterus.   Neck: Normal range of motion. Neck supple. No hepatojugular reflux and no JVD present. No tracheal deviation present. No thyromegaly present.   Cardiovascular: Normal rate, regular rhythm, normal " heart sounds and intact distal pulses. PMI is not displaced.   Pulses:       Carotid pulses are 2+ on the right side, and 2+ on the left side.       Radial pulses are 2+ on the right side, and 2+ on the left side.   Pulmonary/Chest: Effort normal and breath sounds normal.   Chest wall tender to palpation left   Abdominal: Soft. Bowel sounds are normal. She exhibits no distension and no mass. There is no hepatosplenomegaly. There is no tenderness.   Suprapubic catheter in place   Musculoskeletal: She exhibits edema. She exhibits no tenderness.   Skin changes present consistent with chronic venous stasis changes.   Lymphadenopathy:     She has no cervical adenopathy.   Neurological: She is alert and oriented to person, place, and time.   Skin: Skin is warm and dry. No rash noted. No cyanosis or erythema. Nails show no clubbing.   Psychiatric: She has a normal mood and affect. Her speech is normal and behavior is normal.     Labs:     Lab Results   Component Value Date     08/31/2020    K 2.9 (L) 08/31/2020     08/31/2020    CO2 26 08/31/2020    BUN 7 (L) 08/31/2020    CREATININE 1.0 08/31/2020    ANIONGAP 13 08/31/2020     Lab Results   Component Value Date    HGBA1C 5.4 08/25/2016     Lab Results   Component Value Date    BNP 17 01/27/2020     (H) 02/23/2019     (H) 02/21/2019    Lab Results   Component Value Date    WBC 4.81 08/31/2020    HGB 12.5 08/31/2020    HCT 40.3 08/31/2020     08/31/2020    GRAN 3.2 08/31/2020    GRAN 65.4 08/31/2020     Lab Results   Component Value Date    CHOL 152 06/01/2020    HDL 42 06/01/2020    LDLCALC 90.0 06/01/2020    TRIG 100 06/01/2020          Imaging:   TTE 5/13/2020  · Normal left ventricular systolic function. The estimated ejection fraction is 55%.  · Indeterminate left ventricular diastolic function.  · No wall motion abnormalities.  · Normal right ventricular systolic function.  · Biatrial enlargement.  · Mild tricuspid  regurgitation.  · The estimated PA systolic pressure is 23 mmHg.  · Normal central venous pressure (3 mmHg).    Lower extremity arterial ultrasound 3/10/2020  No evidence of hemodynamically significant infrainguinal PAD bilaterally.  Tri- and biphasic waveforms throughout.  Borderline normal KENDALL bilaterally.    Carotid ultrasound 11/30/2017  CONCLUSIONS   There is 20 - 39% right Internal Carotid stenosis.   There is 20 - 39% left Internal Carotid stenosis.     Coronary angiogran 1/7/2016  1. Single vessel coronary artery disease.   2. Subtotal mid LAD but the remainder of the LAD appears to be of small caliber and not attractive for PCI.      Assessment:     1. Coronary artery disease involving native coronary artery of native heart without angina pectoris    2. Ischemic cardiomyopathy    3. Thoracic aorta atherosclerosis    4. Bilateral carotid artery stenosis    5. Chronic venous insufficiency    6. Pure hypercholesterolemia    7. Chronic diastolic heart failure      Plan:     CAD with subtotal LAD occlusion with collateral flow  - Pt describing two week history of worsening atypical chest pain. EKG obtained while she was having chest pain did not show any acute ischemic changes. Her symptoms are difficult to evaluate because she has diffuse chronic pain and is physically debilitated. She has known CAD and has not had ischemic evaluation in 4 years. Will obtain nuclear stress test to evaluate for obstructive CAD.  - Continue Aspirin 81 mg daily and high intensity statin   - Recommend increasing aerobic exercise as able and weight loss..     Ischemic cardiomyopathy, EF now 55%  Diastolic dysfunction  - Pedal edema on exam 2/2 venous insufficiency, otherwise no signs of acute CHF  - Continue Lasix     Carotid artery disease  - Asymptomatic. Mild nonobstructive stenosis bilaterally.  - Continue medical management with ASA and statin.     Follow up in 6 months. This patient was discussed with Dr. Wade.      Signed:  Eviat Chen PA-C  General Cardiology   [id number]  9/4/2020 12:19 PM

## 2020-09-04 NOTE — TELEPHONE ENCOUNTER
----- Message from Juanita Stevenson sent at 9/4/2020 10:59 AM CDT -----  Regarding: pt advice  Contact: pt @ [phone]  Pt asking to speak with someone in Dr. Mayo's office regarding her infection and says she is having a lot of pain. Please call.

## 2020-09-04 NOTE — TELEPHONE ENCOUNTER
Pt currently in cardiology clinic, handed phone to  (Dangelo). States pt c/o pain though she is currently taking antibiotics. She has had an EKG and scheduled for stress testing. Wants Dr. Mayo to call as she does not feel she is getting better with medications (Doxycycline).

## 2020-09-08 ENCOUNTER — TELEPHONE (OUTPATIENT)
Dept: UROLOGY | Facility: CLINIC | Age: 65
End: 2020-09-08

## 2020-09-08 NOTE — TELEPHONE ENCOUNTER
----- Message from Sis Calixto sent at 9/8/2020  9:33 AM CDT -----  Contact: pt  Pt calling to speak with staff         Please contact pt [phone]

## 2020-09-09 ENCOUNTER — TELEPHONE (OUTPATIENT)
Dept: UROLOGY | Facility: CLINIC | Age: 65
End: 2020-09-09

## 2020-09-09 DIAGNOSIS — Z88.2: Primary | ICD-10-CM

## 2020-09-09 NOTE — TELEPHONE ENCOUNTER
Between the patient's allergies and the medications that are avaialble to treat this organism, the doxycycline is the only agent that can treat this particular organism.    She will try to manage the side effects to complete the course of therapy.  Will send a referral to have allergy check the bactrim allergy.

## 2020-09-09 NOTE — TELEPHONE ENCOUNTER
----- Message from Merna Henao LPN sent at 9/8/2020  4:12 PM CDT -----  Regarding: FW: Medication change  Contact: [phone]    ----- Message -----  From: Bryanna Crowder  Sent: 9/8/2020   3:42 PM CDT  To: [facility] Staff  Subject: Medication change                                Calling in regards to getting something else called in to pharmacy because Rx doxycycline (MONODOX) 100 MG capsule gives patient diarrhea. Please call to confirm      [facility] [address] [facility] [address]  [address]  [place] [address]  Phone: [phone] Fax: [phone]

## 2020-09-12 DIAGNOSIS — R77.0 ABNORMAL ALBUMIN: ICD-10-CM

## 2020-09-12 DIAGNOSIS — E87.6 LOW POTASSIUM SYNDROME: Primary | ICD-10-CM

## 2020-09-12 DIAGNOSIS — E61.1 IRON DEFICIENCY: ICD-10-CM

## 2020-09-12 DIAGNOSIS — E87.6 LOW BLOOD POTASSIUM: ICD-10-CM

## 2020-09-14 ENCOUNTER — DOCUMENT SCAN (OUTPATIENT)
Dept: HOME HEALTH SERVICES | Facility: HOSPITAL | Age: 65
End: 2020-09-14
Payer: MEDICAID

## 2020-09-15 RX ORDER — QUETIAPINE FUMARATE 100 MG/1
TABLET, FILM COATED ORAL
Qty: 90 TABLET | Refills: 1 | Status: SHIPPED | OUTPATIENT
Start: 2020-09-15 | End: 2021-01-07 | Stop reason: SDUPTHER

## 2020-09-16 ENCOUNTER — TELEPHONE (OUTPATIENT)
Dept: UROLOGY | Facility: CLINIC | Age: 65
End: 2020-09-16

## 2020-09-16 DIAGNOSIS — N39.0 RECURRENT UTI: Primary | ICD-10-CM

## 2020-09-16 NOTE — TELEPHONE ENCOUNTER
Pt c/o bladder pain/ bladder pressure, and burning. Has been taking AZO for the past 2 weeks off and on and thinks she may be getting a yeast infection. States  nurse changed suprapubic tube today (9/16/20) and collected urine specimen from new catheter for culture.

## 2020-09-16 NOTE — TELEPHONE ENCOUNTER
----- Message from Bryanna Crowder sent at 9/16/2020  9:10 AM CDT -----  Regarding: Order  Contact: Saray mike/Shai Jimenes [phone]  Calling in regards to obtain a order for urine culture in Epic. Please call to confirm

## 2020-09-17 ENCOUNTER — TELEPHONE (OUTPATIENT)
Dept: UROLOGY | Facility: CLINIC | Age: 65
End: 2020-09-17

## 2020-09-17 NOTE — TELEPHONE ENCOUNTER
Received orders through [employer] for the patient's home health [facility] for the doxycycline order and also for hydromorphone for her intrathecal pump.  Before I realized that the hydromorphone order was there, I signed the order.    Called [facility] and spoke to Barbara, clinical coordinator.      Discussed the above and that I cannot sign off on the hydromorphone order as I am not a pain management person and do not manage intrathecal pumps.  They will need to reissue that order from her appropriate physician.  She states she will take care of this.

## 2020-09-21 ENCOUNTER — PATIENT OUTREACH (OUTPATIENT)
Dept: ADMINISTRATIVE | Facility: OTHER | Age: 65
End: 2020-09-21

## 2020-09-23 ENCOUNTER — TELEPHONE (OUTPATIENT)
Dept: UROLOGY | Facility: HOSPITAL | Age: 65
End: 2020-09-23

## 2020-09-23 ENCOUNTER — TELEPHONE (OUTPATIENT)
Dept: CARDIOLOGY | Facility: CLINIC | Age: 65
End: 2020-09-23

## 2020-09-23 ENCOUNTER — TELEPHONE (OUTPATIENT)
Dept: GASTROENTEROLOGY | Facility: CLINIC | Age: 65
End: 2020-09-23

## 2020-09-23 NOTE — TELEPHONE ENCOUNTER
Called and spoke to pt.  Relayed results and recommendations to pt.  Pt passed the phone to her home health nurse.   Pt scheduled for fasting labs and appreciated the call.

## 2020-09-23 NOTE — TELEPHONE ENCOUNTER
----- Message from Prince Vance MD sent at 9/12/2020  2:27 PM CDT -----  IMPORTANT:    Piper please tell Tasha of like to repeat fasting labs on her this week to recheck her low albumin and low potassium and her slightly low iron.  Orders placed

## 2020-09-24 NOTE — TELEPHONE ENCOUNTER
----- Message from Shireen Mayo MD sent at 9/23/2020  6:50 PM CDT -----  Regarding: RE: Yeast in Urine  I did not treat her because changing the catheter generally takes care of the yeast.  If she is asymptomatic, we don't need to treat.  Can you call her and find out if she is still symptomatic?  Thanks.   ----- Message -----  From: Christi Wang LPN  Sent: 9/23/2020   4:29 PM CDT  To: Shireen Mayo MD  Subject: FW: Yeast in Urine                                 ----- Message -----  From: Louise Landrum  Sent: 9/23/2020   3:29 PM CDT  To: Maria Esther Iyer Staff  Subject: Yeast in Urine                                   Lázaro Johnson With [facility] Ph [phone]  Fax [phone]    Calling to see about getting an Order to Treat Yeast that patient has in her Urine.  Stated that she faxed over Lab Results on 9/18/20 showing that patient has Yeast in Urine and is waiting to hear from doctor office

## 2020-09-24 NOTE — TELEPHONE ENCOUNTER
Pt's  (Mr. Pierson) answered stating pt was asleep but he will have her return call to clinic. He states she hadn't been sleeping much at night due to pain.

## 2020-09-24 NOTE — TELEPHONE ENCOUNTER
----- Message from Shireen Mayo MD sent at 9/23/2020  6:50 PM CDT -----  Regarding: RE: Yeast in Urine  I did not treat her because changing the catheter generally takes care of the yeast.  If she is asymptomatic, we don't need to treat.  Can you call her and find out if she is still symptomatic?  Thanks.   ----- Message -----  From: Christi Wang LPN  Sent: 9/23/2020   4:29 PM CDT  To: Shireen Mayo MD  Subject: FW: Yeast in Urine                                 ----- Message -----  From: Louise Landrum  Sent: 9/23/2020   3:29 PM CDT  To: Maria Esther Iyer Staff  Subject: Yeast in Urine                                   Lázaro Johnson With [facility] Ph [phone]  Fax [phone]    Calling to see about getting an Order to Treat Yeast that patient has in her Urine.  Stated that she faxed over Lab Results on 9/18/20 showing that patient has Yeast in Urine and is waiting to hear from doctor office

## 2020-09-25 ENCOUNTER — TELEPHONE (OUTPATIENT)
Dept: UROLOGY | Facility: CLINIC | Age: 65
End: 2020-09-25

## 2020-09-25 DIAGNOSIS — B37.49 YEAST UTI: Primary | ICD-10-CM

## 2020-09-25 RX ORDER — FLUCONAZOLE 200 MG/1
200 TABLET ORAL DAILY
Qty: 7 TABLET | Refills: 0 | Status: SHIPPED | OUTPATIENT
Start: 2020-09-25 | End: 2020-10-02

## 2020-09-25 NOTE — TELEPHONE ENCOUNTER
----- Message from Louise Landrum sent at 9/25/2020  2:47 PM CDT -----  Regarding: Yeast Infection  Contact: Patient Called [phone]  Patient states that she has a Yeast Infection.  Wanting to know if something can be called in for her to treat it.  Also states that a specimen was taken from on earlier this week and she is inquiring about what were the results.

## 2020-09-29 ENCOUNTER — OFFICE VISIT (OUTPATIENT)
Dept: ALLERGY | Facility: CLINIC | Age: 65
End: 2020-09-29
Payer: MEDICARE

## 2020-09-29 VITALS — BODY MASS INDEX: 29.53 KG/M2 | HEIGHT: 65 IN | RESPIRATION RATE: 16 BRPM | WEIGHT: 177.25 LBS

## 2020-09-29 DIAGNOSIS — Z88.2: ICD-10-CM

## 2020-09-29 DIAGNOSIS — R21 RASH: Primary | ICD-10-CM

## 2020-09-29 DIAGNOSIS — Z87.440 HISTORY OF RECURRENT UTIS: ICD-10-CM

## 2020-09-29 DIAGNOSIS — Z00.00 HEALTHCARE MAINTENANCE: ICD-10-CM

## 2020-09-29 PROCEDURE — 3008F PR BODY MASS INDEX (BMI) DOCUMENTED: ICD-10-PCS | Mod: HCNC,CPTII,GC,S$GLB | Performed by: STUDENT IN AN ORGANIZED HEALTH CARE EDUCATION/TRAINING PROGRAM

## 2020-09-29 PROCEDURE — 99999 PR PBB SHADOW E&M-EST. PATIENT-LVL IV: ICD-10-PCS | Mod: PBBFAC,HCNC,GC, | Performed by: STUDENT IN AN ORGANIZED HEALTH CARE EDUCATION/TRAINING PROGRAM

## 2020-09-29 PROCEDURE — G0008 FLU VACCINE (QUAD) GREATER THAN OR EQUAL TO 3YO PRESERVATIVE FREE IM: ICD-10-PCS | Mod: HCNC,S$GLB,, | Performed by: ALLERGY & IMMUNOLOGY

## 2020-09-29 PROCEDURE — 99213 PR OFFICE/OUTPT VISIT, EST, LEVL III, 20-29 MIN: ICD-10-PCS | Mod: 25,HCNC,S$GLB, | Performed by: STUDENT IN AN ORGANIZED HEALTH CARE EDUCATION/TRAINING PROGRAM

## 2020-09-29 PROCEDURE — G0008 ADMIN INFLUENZA VIRUS VAC: HCPCS | Mod: HCNC,S$GLB,, | Performed by: ALLERGY & IMMUNOLOGY

## 2020-09-29 PROCEDURE — 99999 PR PBB SHADOW E&M-EST. PATIENT-LVL IV: CPT | Mod: PBBFAC,HCNC,GC, | Performed by: STUDENT IN AN ORGANIZED HEALTH CARE EDUCATION/TRAINING PROGRAM

## 2020-09-29 PROCEDURE — 99213 OFFICE O/P EST LOW 20 MIN: CPT | Mod: 25,HCNC,S$GLB, | Performed by: STUDENT IN AN ORGANIZED HEALTH CARE EDUCATION/TRAINING PROGRAM

## 2020-09-29 PROCEDURE — 90686 IIV4 VACC NO PRSV 0.5 ML IM: CPT | Mod: HCNC,S$GLB,, | Performed by: ALLERGY & IMMUNOLOGY

## 2020-09-29 PROCEDURE — 3008F BODY MASS INDEX DOCD: CPT | Mod: HCNC,CPTII,GC,S$GLB | Performed by: STUDENT IN AN ORGANIZED HEALTH CARE EDUCATION/TRAINING PROGRAM

## 2020-09-29 PROCEDURE — 90686 FLU VACCINE (QUAD) GREATER THAN OR EQUAL TO 3YO PRESERVATIVE FREE IM: ICD-10-PCS | Mod: HCNC,S$GLB,, | Performed by: ALLERGY & IMMUNOLOGY

## 2020-09-29 NOTE — PROGRESS NOTES
ALLERGY & IMMUNOLOGY CLINIC - FOLLOW UP     HISTORY OF PRESENT ILLNESS     Patient ID: Tasha Hawley is a 65 y.o. female    CC: Bactrim allergy    HPI: Tasha Hawley is a 65 y.o. female with a suprapubic catheter and multiple UTIs who was sent to us by urology for bactrim allergy evaluation    Last seen in this clinic for penicillin allergy eval. She had negative pcn testing, and her pcn allergy was removed.    She was not aware of why she was sent to our clinic. When informed that it was for evaluation of her bactrim allergy, she said that she would absolutely never take bactrim again. She said even if we said she was not allergic, she will not take it.    She said that when she gets bactrim she gets rash, short winded, and nautious about 15-30 minutes after she takes a dose. She said it has happened multiple time, last time was last year (or a few years ago). She says that she realized it was an allergic reaction the last time she took it because she had vomiting.    She also has a listed allergy to vancomycin, which caused repetitive vomiting.       REVIEW OF SYSTEMS     CONST: no F/C/NS, no unintentional weight changes  PULM: no SOB, no wheezing, no cough  DERM: no rashes, no skin breaks  Rest of ROS negative unless otherwise stated in the HPI.     MEDICAL HISTORY     MedHx:   Patient Active Problem List   Diagnosis    Generalized anxiety disorder    Iron deficiency anemia    Major depressive disorder, recurrent, mild    Chronic pain syndrome    Cervical myelopathy    Narcotic dependency, continuous    Thoracic aorta atherosclerosis    Drug-induced constipation    GERD (gastroesophageal reflux disease)    Coronary artery disease involving native coronary artery of native heart without angina pectoris    Neurogenic bladder    Urinary retention    Frequent falls    Weight loss, unintentional    Primary hypothyroidism    Lymphedema of both lower extremities    Scoliosis deformity of spine     S/P insertion of spinal cord stimulator    S/P insertion of intrathecal pump    Paresthesia of both lower extremities    Degenerative disc disease, lumbar    Osteoarthritis of spine with radiculopathy, lumbar region    Facet arthropathy, lumbar    Bilateral carotid artery disease    Insomnia due to medical condition    Suprapubic catheter    Ischemic cardiomyopathy    Esophageal dysphagia    Recurrent UTI (urinary tract infection)    Dyspnea    Costochondritis    Mixed stress and urge urinary incontinence    Chronic venous insufficiency    Inflammatory spondylopathy of lumbar region    Pure hypercholesterolemia    Chronic diastolic heart failure    Left lower quadrant abdominal pain    Partial small bowel obstruction    Bradycardia, sinus    Constipation due to opioid therapy    Left sided abdominal pain    Pain in both lower legs    Urinary tract infection associated with cystostomy catheter    Hypotension    Swelling of both lower extremities    Impaired functional mobility and activity tolerance     Medications:   Current Outpatient Medications on File Prior to Visit   Medication Sig Dispense Refill    aspirin (ECOTRIN) 81 MG EC tablet Take 1 tablet (81 mg total) by mouth once daily.  0    atorvastatin (LIPITOR) 80 MG tablet TAKE 1 TABLET BY MOUTH DAILY 90 tablet 3    butalbital-acetaminophen-caffeine -40 mg (FIORICET, ESGIC) -40 mg per tablet Take 1 tablet by mouth every 4 (four) hours as needed for Headaches.       fluconazole (DIFLUCAN) 200 MG Tab Take 1 tablet (200 mg total) by mouth once daily. For Candidal UTI for 7 days 7 tablet 0    FLUoxetine 20 MG capsule TAKE THREE CAPSULES BY MOUTH EVERY  capsule 1    furosemide (LASIX) 40 MG tablet Take 2 tablets every morning and one every evening (Patient taking differently: Take 2 tablets every morning or as directed) 270 tablet 3    HYDROcodone-acetaminophen (NORCO)  mg per tablet Take 1 tablet by  mouth every 6 (six) hours as needed for Pain. 15 tablet 0    intrathecal pain pump compound Hydromorphone (7.5 mg/mL) infusion at 3.6799 mg/day (0.1533 mg/hr) out of a total reservoir volume of 37.3 mL  Pump filled every 2 months      levothyroxine (SYNTHROID) 125 MCG tablet Take 1 tablet (125 mcg total) by mouth before breakfast. 90 tablet 3    nitroGLYCERIN (NITROSTAT) 0.4 MG SL tablet Place 1 tablet (0.4 mg total) under the tongue every 5 (five) minutes as needed for Chest pain. 25 tablet 3    oxybutynin (DITROPAN XL) 15 MG TR24 Take 1 tablet (15 mg total) by mouth once daily. 90 tablet 3    pantoprazole (PROTONIX) 40 MG tablet Take 1 tablet (40 mg total) by mouth once daily. 90 tablet 3    promethazine (PHENERGAN) 25 MG tablet Take 25 mg by mouth every 6 (six) hours as needed for Nausea.      QUEtiapine (SEROQUEL) 100 MG Tab TAKE 1 TABLET (100 MG TOTAL) BY MOUTH EVERY EVENING. 90 tablet 1    senna (SENNA LAX) 8.6 mg tablet Take 2 tablets by mouth 2 (two) times daily.      traZODone (DESYREL) 100 MG tablet Take 3 tablets (300 mg total) by mouth every evening. 270 tablet 1    ascorbic acid, vitamin C, (VITAMIN C) 500 MG tablet Take 500 mg by mouth once daily.      gabapentin (NEURONTIN) 100 MG capsule Take 1 capsule (100 mg total) by mouth 3 (three) times daily. (Patient not taking: Reported on 9/29/2020) 90 capsule 2    [DISCONTINUED] lamotrigine (LAMICTAL) 25 MG tablet Take 1 tablet (25 mg total) by mouth once daily. 30 tablet 6     No current facility-administered medications on file prior to visit.        Vaccines: given 9/29/20    Drug Allergies:   Review of patient's allergies indicates:   Allergen Reactions    (d)-limonene flavor      Other reaction(s): difficult intubation  Other reaction(s): Difficulty breathing    Bactrim [sulfamethoxazole-trimethoprim] Anaphylaxis    Benadryl [diphenhydramine hcl] Shortness Of Breath    Imitrex [sumatriptan succinate] Shortness Of Breath    Topamax  "[topiramate] Shortness Of Breath    Vancomycin Shortness Of Breath     Rash    Butorphanol tartrate     Darvocet a500 [propoxyphene n-acetaminophen]      Other reaction(s): Difficulty breathing    Fentanyl      Other reaction(s): Vomiting  Other reaction(s): Nausea  Other reaction(s): Itching  swelling    White petrolatum-zinc     Zinc oxide-white petrolatum      Other reaction(s): Difficulty breathing    Latex, natural rubber Itching and Rash    Phenytoin Rash and Other (See Comments)     Trouble breathing         PHYSICAL EXAM     VS: Resp 16   Ht 5' 5" (1.651 m)   Wt 80.4 kg (177 lb 4 oz)   LMP  (LMP Unknown)   BMI 29.50 kg/m²   GENERAL: AAOx4, NAD, well-appearing, cooperative  EYES: EOMI, no conjunctival injection, no discharge, no infraorbital shiners  LUNGS: CTAB, no w/r/c, no increased WOB  HEART: RRR, normal S1/S2, no m/g/r  DERM: no rashes, no skin breaks, no dystrophic fingernails  NEURO: normal gait, no facial asymmetry     LABORATORY STUDIES     No pertinent labs for this visit       CHART REVIEW     Reviewed prior allergy note, urology note     ASSESSMENT & PLAN     Tasha Hawley is a 65 y.o. female with     # Bactrim allergy: Patient with history concerning for IgE mediated allergy (GI, respiratory, and skin sxs 15-30 minutes after taking bactrim.)  -offered testing/challenge to patient  -patient said she absolutely does not want testing or a challenge for bactrim. She said that even if we told her she was not allergic, she would not take it  -explained why it would be important to evaluate this allergy given her recurrent UTI, but patient was very adamant that she did not want testing  -Told patient that if she ever changes her mind, we can re-evaluate her in the future  -Patient should continue avoiding bactrim  -Please note that bactrim allergic patients can safely take non-antibiotic sulfa drugs    # Healthcare maintenance:  -flu vaccine given today    Discussed with: Dr." Angy  Follow up: As needed     Kathryn Pittman MD  Allergy/Immunology Fellow

## 2020-09-30 ENCOUNTER — TELEPHONE (OUTPATIENT)
Dept: ENDOSCOPY | Facility: HOSPITAL | Age: 65
End: 2020-09-30

## 2020-09-30 NOTE — TELEPHONE ENCOUNTER
----- Message from Lakesha Cam sent at 9/30/2020  4:49 PM CDT -----  Pt is calling to speak with the nurse about her blood work and would like for the nurse to give her a call back

## 2020-10-01 ENCOUNTER — TELEPHONE (OUTPATIENT)
Dept: GASTROENTEROLOGY | Facility: CLINIC | Age: 65
End: 2020-10-01

## 2020-10-01 ENCOUNTER — DOCUMENT SCAN (OUTPATIENT)
Dept: HOME HEALTH SERVICES | Facility: HOSPITAL | Age: 65
End: 2020-10-01
Payer: MEDICAID

## 2020-10-01 NOTE — TELEPHONE ENCOUNTER
Called and spoke to pt.  Informed pt once provider reviews results, we will contact her to discuss.  Pt appreciated the call.

## 2020-10-01 NOTE — TELEPHONE ENCOUNTER
----- Message from Lisa Bryson sent at 10/1/2020 12:49 PM CDT -----  Pt she would like to speak with nurse in regards to  her blood work states she not feeling well

## 2020-10-04 DIAGNOSIS — D50.9 IRON DEFICIENCY ANEMIA, UNSPECIFIED IRON DEFICIENCY ANEMIA TYPE: Primary | ICD-10-CM

## 2020-10-04 DIAGNOSIS — K59.09 CONSTIPATION, CHRONIC: ICD-10-CM

## 2020-10-07 ENCOUNTER — TELEPHONE (OUTPATIENT)
Dept: UROLOGY | Facility: CLINIC | Age: 65
End: 2020-10-07

## 2020-10-07 DIAGNOSIS — N39.0 RECURRENT UTI: Primary | ICD-10-CM

## 2020-10-07 NOTE — TELEPHONE ENCOUNTER
----- Message from Bryanna Crowder sent at 10/7/2020  4:28 PM CDT -----  Regarding: Plan of care  Contact: [phone]  Calling in regards to speaking with doctor regarding plan of care. Please call

## 2020-10-07 NOTE — TELEPHONE ENCOUNTER
HH nurse (Saray) asked that I place order in Epic since [facility] takes too long to result orders. Order done.

## 2020-10-12 ENCOUNTER — TELEPHONE (OUTPATIENT)
Dept: CARDIOLOGY | Facility: CLINIC | Age: 65
End: 2020-10-12

## 2020-10-12 ENCOUNTER — TELEPHONE (OUTPATIENT)
Dept: UROLOGY | Facility: CLINIC | Age: 65
End: 2020-10-12

## 2020-10-12 NOTE — TELEPHONE ENCOUNTER
----- Message from Karoline Land MA sent at 10/12/2020  9:55 AM CDT -----  Contact: [phone]   Pt states that she has been taking Azo since last Thursday, she feels raw below and pressure in her kidneys .  [phone]

## 2020-10-12 NOTE — TELEPHONE ENCOUNTER
----- Message from Bryanna Crowder sent at 10/12/2020  2:39 PM CDT -----  Regarding: Plan of care  Contact: [phone]  Calling to speak with nurse about pain, burning sensation, and incontinence. Please call

## 2020-10-13 ENCOUNTER — TELEPHONE (OUTPATIENT)
Dept: ENDOSCOPY | Facility: HOSPITAL | Age: 65
End: 2020-10-13

## 2020-10-13 ENCOUNTER — TELEPHONE (OUTPATIENT)
Dept: GASTROENTEROLOGY | Facility: CLINIC | Age: 65
End: 2020-10-13

## 2020-10-13 ENCOUNTER — TELEPHONE (OUTPATIENT)
Dept: INFECTIOUS DISEASES | Facility: CLINIC | Age: 65
End: 2020-10-13

## 2020-10-13 DIAGNOSIS — K59.00 CONSTIPATION, UNSPECIFIED CONSTIPATION TYPE: Primary | ICD-10-CM

## 2020-10-13 DIAGNOSIS — Z12.11 SPECIAL SCREENING FOR MALIGNANT NEOPLASMS, COLON: Primary | ICD-10-CM

## 2020-10-13 DIAGNOSIS — A49.01 STAPH AUREUS INFECTION: Primary | ICD-10-CM

## 2020-10-13 RX ORDER — POLYETHYLENE GLYCOL 3350, SODIUM SULFATE ANHYDROUS, SODIUM BICARBONATE, SODIUM CHLORIDE, POTASSIUM CHLORIDE 236; 22.74; 6.74; 5.86; 2.97 G/4L; G/4L; G/4L; G/4L; G/4L
4 POWDER, FOR SOLUTION ORAL ONCE
Qty: 4000 ML | Refills: 0 | Status: SHIPPED | OUTPATIENT
Start: 2020-10-13 | End: 2020-10-13

## 2020-10-13 NOTE — TELEPHONE ENCOUNTER
----- Message from Corinna Biswas MA sent at 10/12/2020  4:17 PM CDT -----  Regarding: FW: IV Treatment    ----- Message -----  From: Daja Anthony  Sent: 10/12/2020   3:22 PM CDT  To: Mika Campbell Staff  Subject: IV Treatment                                     Pt called requesting a call back in regards to her  IV Treatment       [phone]

## 2020-10-13 NOTE — TELEPHONE ENCOUNTER
Attempted to call the patient twice.  Left messages both times.    Assessment  1.  MSSA urinary tract infection.  Concerning for a bacteremia with secondary ceding to urine.    Plan  1.  Check blood cultures.  (please schedule to be done today)  2.  Will follow up on results

## 2020-10-13 NOTE — TELEPHONE ENCOUNTER
Called and spoke to pt.  Relayed results to pt.  Pt verbalized understanding.  Pt appreciated the call.

## 2020-10-13 NOTE — TELEPHONE ENCOUNTER
----- Message from Rita Persaud sent at 10/13/2020  8:15 AM CDT -----  Please give pt a call about her blood and iron results. [phone]

## 2020-10-14 ENCOUNTER — HOSPITAL ENCOUNTER (OUTPATIENT)
Dept: CARDIOLOGY | Facility: HOSPITAL | Age: 65
Discharge: HOME | End: 2020-10-14
Attending: PHYSICIAN ASSISTANT
Payer: MEDICARE

## 2020-10-14 ENCOUNTER — TELEPHONE (OUTPATIENT)
Dept: INFECTIOUS DISEASES | Facility: CLINIC | Age: 65
End: 2020-10-14

## 2020-10-14 VITALS — BODY MASS INDEX: 29.49 KG/M2 | WEIGHT: 177 LBS | HEIGHT: 65 IN

## 2020-10-14 DIAGNOSIS — I25.10 CORONARY ARTERY DISEASE INVOLVING NATIVE CORONARY ARTERY OF NATIVE HEART WITHOUT ANGINA PECTORIS: Chronic | ICD-10-CM

## 2020-10-14 LAB
CV PHARM DOSE: 0.4 MG
CV STRESS BASE HR: 54 BPM
DIASTOLIC BLOOD PRESSURE: 59 MMHG
END DIASTOLIC INDEX-HIGH: 170 ML/M2
END SYSTOLIC INDEX-HIGH: 70 ML/M2
OHS CV CPX 85 PERCENT MAX PREDICTED HEART RATE MALE: 127
OHS CV CPX MAX PREDICTED HEART RATE: 150
OHS CV CPX PATIENT IS FEMALE: 1
OHS CV CPX PATIENT IS MALE: 0
OHS CV CPX PEAK DIASTOLIC BLOOD PRESSURE: 59 MMHG
OHS CV CPX PEAK HEAR RATE: 52 BPM
OHS CV CPX PEAK RATE PRESSURE PRODUCT: 5356
OHS CV CPX PEAK SYSTOLIC BLOOD PRESSURE: 103 MMHG
OHS CV CPX PERCENT MAX PREDICTED HEART RATE ACHIEVED: 35
OHS CV CPX RATE PRESSURE PRODUCT PRESENTING: 5562
RETIRED EF AND QEF - SEE NOTES: 51 %
SYSTOLIC BLOOD PRESSURE: 103 MMHG

## 2020-10-14 PROCEDURE — 93017 CV STRESS TEST TRACING ONLY: CPT | Mod: HCNC

## 2020-10-14 PROCEDURE — 93018 CV STRESS TEST I&R ONLY: CPT | Mod: HCNC,,, | Performed by: INTERNAL MEDICINE

## 2020-10-14 PROCEDURE — A9502 TC99M TETROFOSMIN: HCPCS | Mod: HCNC,,, | Performed by: INTERNAL MEDICINE

## 2020-10-14 PROCEDURE — A9502 STRESS TEST WITH MYOCARDIAL PERFUSION (CUPID ONLY): ICD-10-PCS | Mod: HCNC,,, | Performed by: INTERNAL MEDICINE

## 2020-10-14 PROCEDURE — 78452 STRESS TEST WITH MYOCARDIAL PERFUSION (CUPID ONLY): ICD-10-PCS | Mod: 26,HCNC,, | Performed by: INTERNAL MEDICINE

## 2020-10-14 PROCEDURE — 93016 STRESS TEST WITH MYOCARDIAL PERFUSION (CUPID ONLY): ICD-10-PCS | Mod: HCNC,,, | Performed by: INTERNAL MEDICINE

## 2020-10-14 PROCEDURE — 93018 STRESS TEST WITH MYOCARDIAL PERFUSION (CUPID ONLY): ICD-10-PCS | Mod: HCNC,,, | Performed by: INTERNAL MEDICINE

## 2020-10-14 PROCEDURE — 78452 HT MUSCLE IMAGE SPECT MULT: CPT | Mod: 26,HCNC,, | Performed by: INTERNAL MEDICINE

## 2020-10-14 PROCEDURE — 63600175 PHARM REV CODE 636 W HCPCS: Mod: HCNC | Performed by: PHYSICIAN ASSISTANT

## 2020-10-14 PROCEDURE — 93016 CV STRESS TEST SUPVJ ONLY: CPT | Mod: HCNC,,, | Performed by: INTERNAL MEDICINE

## 2020-10-14 RX ORDER — AMINOPHYLLINE 25 MG/ML
75 INJECTION, SOLUTION INTRAVENOUS ONCE
Status: COMPLETED | OUTPATIENT
Start: 2020-10-14 | End: 2020-10-14

## 2020-10-14 RX ORDER — REGADENOSON 0.08 MG/ML
0.4 INJECTION, SOLUTION INTRAVENOUS
Status: COMPLETED | OUTPATIENT
Start: 2020-10-14 | End: 2020-10-14

## 2020-10-14 RX ADMIN — REGADENOSON 0.4 MG: 0.08 INJECTION, SOLUTION INTRAVENOUS at 10:10

## 2020-10-14 RX ADMIN — AMINOPHYLLINE 75 MG: 25 INJECTION, SOLUTION INTRAVENOUS at 10:10

## 2020-10-14 NOTE — TELEPHONE ENCOUNTER
----- Message from Monik Kinsey sent at 10/14/2020 11:00 AM CDT -----  Contact: Tasha  tel:   [phone]  Caller is asking if Dr. Brennan can see her this a.m. .   She had early appts. Today and is at the clinic on [facility].   Can the  See her earlier than 3pm today.   Asking if she can come much earlier.  Does not want to drive to [place] and then back again .    Pls call.

## 2020-10-14 NOTE — TELEPHONE ENCOUNTER
Spoke with pt and explained that the provider is not in today but she does have an appointment for Monday and she is fine with that date

## 2020-10-14 NOTE — TELEPHONE ENCOUNTER
----- Message from Corinna Biswas MA sent at 10/14/2020  3:40 PM CDT -----  Regarding: FW: Results  Contact: [phone]    ----- Message -----  From: Bryanna Crowder  Sent: 10/14/2020   3:37 PM CDT  To: Mika Campbell Staff  Subject: Results                                          Calling in regards to results. Please call

## 2020-10-14 NOTE — TELEPHONE ENCOUNTER
Patient has suprapubic catheter.  Urine cultures were positive for staph aureus with a low colony count (10-50,000).  Patient denies fevers but does report pain in the pelvic area.  Blood cultures checked yesterday to verify that patient is not bacteremic with staph aureus.  If blood cultures negative, then will arrange for a midline and initiate a course of IV therapy.

## 2020-10-15 ENCOUNTER — TELEPHONE (OUTPATIENT)
Dept: GASTROENTEROLOGY | Facility: CLINIC | Age: 65
End: 2020-10-15

## 2020-10-15 NOTE — TELEPHONE ENCOUNTER
Called and spoke to pt.  Reviewed pt results again with pt.  Pt says she has a staff infection and informed pt to contact PCP for further evaluation.  Asked pt if she wants contact info for endo schedulers.  Pt says she will think about it and call back.

## 2020-10-15 NOTE — TELEPHONE ENCOUNTER
----- Message from Parvin Lara MA sent at 10/14/2020  6:38 PM CDT -----  Contact: Pt @ [phone]    ----- Message -----  From: Nahomy England  Sent: 10/14/2020   4:33 PM CDT  To: Pinky YEN Staff    Pt would like to speak with staff regarding lab results.     Please contact Pt @ [phone]

## 2020-10-18 DIAGNOSIS — D50.9 IRON DEFICIENCY ANEMIA, UNSPECIFIED IRON DEFICIENCY ANEMIA TYPE: Primary | ICD-10-CM

## 2020-10-18 DIAGNOSIS — K59.09 CONSTIPATION, CHRONIC: ICD-10-CM

## 2020-10-19 ENCOUNTER — OFFICE VISIT (OUTPATIENT)
Dept: INFECTIOUS DISEASES | Facility: CLINIC | Age: 65
End: 2020-10-19
Payer: MEDICARE

## 2020-10-19 ENCOUNTER — LAB VISIT (OUTPATIENT)
Dept: LAB | Facility: HOSPITAL | Age: 65
End: 2020-10-19
Attending: INTERNAL MEDICINE
Payer: MEDICARE

## 2020-10-19 VITALS
HEART RATE: 58 BPM | WEIGHT: 197.31 LBS | HEIGHT: 65 IN | DIASTOLIC BLOOD PRESSURE: 52 MMHG | SYSTOLIC BLOOD PRESSURE: 103 MMHG | BODY MASS INDEX: 32.87 KG/M2 | TEMPERATURE: 98 F

## 2020-10-19 DIAGNOSIS — T14.8XXA WOUND INFECTION: ICD-10-CM

## 2020-10-19 DIAGNOSIS — E55.9 VITAMIN D INSUFFICIENCY: ICD-10-CM

## 2020-10-19 DIAGNOSIS — A49.01 MSSA (METHICILLIN SUSCEPTIBLE STAPHYLOCOCCUS AUREUS) INFECTION: ICD-10-CM

## 2020-10-19 DIAGNOSIS — N39.0 COMPLICATED UTI (URINARY TRACT INFECTION): Primary | ICD-10-CM

## 2020-10-19 DIAGNOSIS — L03.115 CELLULITIS OF RIGHT LOWER EXTREMITY: ICD-10-CM

## 2020-10-19 DIAGNOSIS — E87.6 LOW BLOOD POTASSIUM: ICD-10-CM

## 2020-10-19 DIAGNOSIS — L08.9 WOUND INFECTION: ICD-10-CM

## 2020-10-19 LAB
25(OH)D3+25(OH)D2 SERPL-MCNC: 18 NG/ML (ref 30–96)
GRAM STN SPEC: NORMAL
GRAM STN SPEC: NORMAL

## 2020-10-19 PROCEDURE — 87186 SC STD MICRODIL/AGAR DIL: CPT | Mod: HCNC

## 2020-10-19 PROCEDURE — 99214 OFFICE O/P EST MOD 30 MIN: CPT | Mod: HCNC,S$GLB,, | Performed by: INTERNAL MEDICINE

## 2020-10-19 PROCEDURE — 87070 CULTURE OTHR SPECIMN AEROBIC: CPT | Mod: HCNC

## 2020-10-19 PROCEDURE — 99214 PR OFFICE/OUTPT VISIT, EST, LEVL IV, 30-39 MIN: ICD-10-PCS | Mod: HCNC,S$GLB,, | Performed by: INTERNAL MEDICINE

## 2020-10-19 PROCEDURE — 87205 SMEAR GRAM STAIN: CPT | Mod: HCNC

## 2020-10-19 PROCEDURE — 99999 PR PBB SHADOW E&M-EST. PATIENT-LVL V: CPT | Mod: PBBFAC,HCNC,, | Performed by: INTERNAL MEDICINE

## 2020-10-19 PROCEDURE — 3008F BODY MASS INDEX DOCD: CPT | Mod: HCNC,CPTII,S$GLB, | Performed by: INTERNAL MEDICINE

## 2020-10-19 PROCEDURE — 87077 CULTURE AEROBIC IDENTIFY: CPT | Mod: HCNC

## 2020-10-19 PROCEDURE — 36415 COLL VENOUS BLD VENIPUNCTURE: CPT | Mod: HCNC

## 2020-10-19 PROCEDURE — 99999 PR PBB SHADOW E&M-EST. PATIENT-LVL V: ICD-10-PCS | Mod: PBBFAC,HCNC,, | Performed by: INTERNAL MEDICINE

## 2020-10-19 PROCEDURE — 82306 VITAMIN D 25 HYDROXY: CPT | Mod: HCNC

## 2020-10-19 PROCEDURE — 3008F PR BODY MASS INDEX (BMI) DOCUMENTED: ICD-10-PCS | Mod: HCNC,CPTII,S$GLB, | Performed by: INTERNAL MEDICINE

## 2020-10-19 PROCEDURE — 87075 CULTR BACTERIA EXCEPT BLOOD: CPT | Mod: HCNC

## 2020-10-19 RX ORDER — CEFADROXIL 1000 MG/1
1 TABLET ORAL 2 TIMES DAILY
Qty: 20 TABLET | Refills: 0 | Status: SHIPPED | OUTPATIENT
Start: 2020-10-19 | End: 2020-10-21

## 2020-10-19 RX ORDER — METHYLPREDNISOLONE 4 MG/1
TABLET ORAL
Status: ON HOLD | COMMUNITY
Start: 2020-10-01 | End: 2020-12-15

## 2020-10-19 NOTE — PROGRESS NOTES
Subjective:      Patient ID: Tasha Hawley is a 65 y.o. female.    Chief Complaint:Follow-up      History of Present Illness      66 y/o female with a history of iron deficiency anemia, CAD, neurogenic bladder initially managed with botox injections and currently managed with a suprapubic catheter.  She is here today for evaluation of a UTI.  Her usual UTI symptoms include increase pain/pressure in her lower abdomen, urinary urgency, odor to the urine and sediment in the urine.      States she hasn't been doing very well.  Her urine has been very cloudy and has an odor to it.  She has also noted sediment in her urine.  She complains of pain in the pelvic area.  She also reports some drainage from around the suprapubic catheter insertion site. Urine cultures obtained by her urologist were positive for MSSA.      In addition to the above, she also reports swelling in her bilateral LE with associated pain and erythema.    Review of Systems   Constitution: Positive for chills, decreased appetite, malaise/fatigue, night sweats and weight gain. Negative for fever and weight loss.   HENT: Positive for ear pain and hearing loss. Negative for congestion, hoarse voice, sore throat and tinnitus.    Eyes: Positive for blurred vision. Negative for redness and visual disturbance.   Cardiovascular: Positive for chest pain and leg swelling. Negative for palpitations.   Respiratory: Positive for shortness of breath and wheezing. Negative for cough, hemoptysis and sputum production.    Hematologic/Lymphatic: Negative for adenopathy. Does not bruise/bleed easily.   Skin: Positive for dry skin. Negative for itching, rash and suspicious lesions.   Musculoskeletal: Positive for back pain, joint pain, myalgias and neck pain.   Gastrointestinal: Positive for abdominal pain, constipation and vomiting. Negative for diarrhea, heartburn and nausea.   Genitourinary: Positive for dysuria, hematuria, hesitancy and urgency. Negative for flank  pain and frequency.   Neurological: Positive for dizziness and headaches. Negative for numbness, paresthesias and weakness.   Psychiatric/Behavioral: Positive for depression and memory loss. The patient has insomnia and is nervous/anxious.      Objective:   Physical Exam  Vitals signs and nursing note reviewed.   Constitutional:       General: She is not in acute distress.     Appearance: She is well-developed. She is not diaphoretic.   HENT:      Head: Normocephalic and atraumatic.      Right Ear: External ear normal.      Left Ear: External ear normal.      Nose: Nose normal.      Mouth/Throat:      Pharynx: No oropharyngeal exudate.   Eyes:      General: No scleral icterus.        Right eye: No discharge.         Left eye: No discharge.      Conjunctiva/sclera: Conjunctivae normal.      Pupils: Pupils are equal, round, and reactive to light.   Neck:      Musculoskeletal: Normal range of motion and neck supple.      Thyroid: No thyromegaly.      Vascular: No JVD.      Trachea: No tracheal deviation.   Cardiovascular:      Rate and Rhythm: Normal rate and regular rhythm.      Heart sounds: No murmur. No friction rub. No gallop.    Pulmonary:      Effort: Pulmonary effort is normal. No respiratory distress.      Breath sounds: Normal breath sounds. No stridor. No wheezing or rales.   Chest:      Chest wall: No tenderness.   Abdominal:      General: Bowel sounds are normal. There is no distension.      Palpations: Abdomen is soft. There is no mass.      Tenderness: There is no abdominal tenderness. There is no guarding or rebound.   Genitourinary:     Comments: Suprapubic catheter in place. There is some purulent drainage around it.  Musculoskeletal: Normal range of motion.         General: Tenderness (bilateral LE) present.      Right lower leg: Edema present.      Left lower leg: Edema present.   Lymphadenopathy:      Cervical: No cervical adenopathy.   Skin:     General: Skin is warm.      Coloration: Skin is not  pale.      Findings: Erythema (to LE bilaterally) present. No rash.   Neurological:      Mental Status: She is alert and oriented to person, place, and time.      Cranial Nerves: No cranial nerve deficit.      Motor: No abnormal muscle tone.      Coordination: Coordination normal.      Deep Tendon Reflexes: Reflexes normal.   Psychiatric:         Behavior: Behavior normal.         Thought Content: Thought content normal.         Judgment: Judgment normal.       Assessment:       1. Complicated UTI (urinary tract infection) : Urine cultures were positive for MSSA.  Subsequent blood cultures obtained to r/o bacteremia were negative.  I will manage her with 10 days of cefadroxil po bid.   2. MSSA (methicillin susceptible Staphylococcus aureus) infection : Plan as outlined above.   3. Cellulitis of right lower extremity :  Patient will be on oral cefadroxil which should clear this up.   4. Wound infection :  Concerned about the drainage from around the suprapubic catheter site.  Cultures were obtained.  Will notify the patient of the results.         Plan:       Complicated UTI (urinary tract infection)  -     cefadroxil (DURICEF) 1 gram tablet; Take 1 tablet (1 g total) by mouth 2 (two) times daily. for 10 days  Dispense: 20 tablet; Refill: 0    MSSA (methicillin susceptible Staphylococcus aureus) infection  -     cefadroxil (DURICEF) 1 gram tablet; Take 1 tablet (1 g total) by mouth 2 (two) times daily. for 10 days  Dispense: 20 tablet; Refill: 0    Cellulitis of right lower extremity  -     cefadroxil (DURICEF) 1 gram tablet; Take 1 tablet (1 g total) by mouth 2 (two) times daily. for 10 days  Dispense: 20 tablet; Refill: 0    Wound infection  -     Gram stain  -     Aerobic culture  -     Culture, Anaerobic

## 2020-10-21 ENCOUNTER — TELEPHONE (OUTPATIENT)
Dept: CARDIOLOGY | Facility: CLINIC | Age: 65
End: 2020-10-21

## 2020-10-21 ENCOUNTER — TELEPHONE (OUTPATIENT)
Dept: INFECTIOUS DISEASES | Facility: CLINIC | Age: 65
End: 2020-10-21

## 2020-10-21 ENCOUNTER — OFFICE VISIT (OUTPATIENT)
Dept: CARDIOLOGY | Facility: CLINIC | Age: 65
End: 2020-10-21
Payer: MEDICARE

## 2020-10-21 VITALS
HEART RATE: 50 BPM | WEIGHT: 191.13 LBS | SYSTOLIC BLOOD PRESSURE: 98 MMHG | DIASTOLIC BLOOD PRESSURE: 48 MMHG | HEIGHT: 65 IN | BODY MASS INDEX: 31.85 KG/M2

## 2020-10-21 DIAGNOSIS — Z74.09 IMPAIRED FUNCTIONAL MOBILITY AND ACTIVITY TOLERANCE: ICD-10-CM

## 2020-10-21 DIAGNOSIS — E78.00 PURE HYPERCHOLESTEROLEMIA: ICD-10-CM

## 2020-10-21 DIAGNOSIS — I65.23 BILATERAL CAROTID ARTERY STENOSIS: ICD-10-CM

## 2020-10-21 DIAGNOSIS — N39.0 COMPLICATED UTI (URINARY TRACT INFECTION): ICD-10-CM

## 2020-10-21 DIAGNOSIS — I87.2 CHRONIC VENOUS INSUFFICIENCY: ICD-10-CM

## 2020-10-21 DIAGNOSIS — I25.10 CORONARY ARTERY DISEASE INVOLVING NATIVE CORONARY ARTERY OF NATIVE HEART WITHOUT ANGINA PECTORIS: Chronic | ICD-10-CM

## 2020-10-21 DIAGNOSIS — I50.32 CHRONIC DIASTOLIC HEART FAILURE: Primary | ICD-10-CM

## 2020-10-21 DIAGNOSIS — A49.01 MSSA (METHICILLIN SUSCEPTIBLE STAPHYLOCOCCUS AUREUS) INFECTION: Primary | ICD-10-CM

## 2020-10-21 DIAGNOSIS — I25.5 ISCHEMIC CARDIOMYOPATHY: Chronic | ICD-10-CM

## 2020-10-21 LAB — BACTERIA SPEC AEROBE CULT: ABNORMAL

## 2020-10-21 PROCEDURE — 3008F BODY MASS INDEX DOCD: CPT | Mod: HCNC,CPTII,S$GLB, | Performed by: PHYSICIAN ASSISTANT

## 2020-10-21 PROCEDURE — 99999 PR PBB SHADOW E&M-EST. PATIENT-LVL V: ICD-10-PCS | Mod: PBBFAC,HCNC,, | Performed by: PHYSICIAN ASSISTANT

## 2020-10-21 PROCEDURE — 99214 PR OFFICE/OUTPT VISIT, EST, LEVL IV, 30-39 MIN: ICD-10-PCS | Mod: HCNC,S$GLB,, | Performed by: PHYSICIAN ASSISTANT

## 2020-10-21 PROCEDURE — 3008F PR BODY MASS INDEX (BMI) DOCUMENTED: ICD-10-PCS | Mod: HCNC,CPTII,S$GLB, | Performed by: PHYSICIAN ASSISTANT

## 2020-10-21 PROCEDURE — 99999 PR PBB SHADOW E&M-EST. PATIENT-LVL V: CPT | Mod: PBBFAC,HCNC,, | Performed by: PHYSICIAN ASSISTANT

## 2020-10-21 PROCEDURE — 99214 OFFICE O/P EST MOD 30 MIN: CPT | Mod: HCNC,S$GLB,, | Performed by: PHYSICIAN ASSISTANT

## 2020-10-21 RX ORDER — CEPHALEXIN 500 MG/1
500 CAPSULE ORAL 4 TIMES DAILY
Qty: 40 CAPSULE | Refills: 0 | Status: SHIPPED | OUTPATIENT
Start: 2020-10-21 | End: 2020-10-31

## 2020-10-21 NOTE — TELEPHONE ENCOUNTER
----- Message from Daja Anthony sent at 10/21/2020  4:05 PM CDT -----  Regarding: Prescription Inquiry  Pt called requesting substitution for methylPREDNISolone (MEDROL DOSEPACK) 4 mg tablet    Pt selwyn Pierson - stated above medication is expensive and pharmacy has reached out to office on several attempts awaiting response from physician.    [phone] ( cell ) [place]

## 2020-10-21 NOTE — PROGRESS NOTES
General Cardiology Clinic Note  Reason for Visit: Weight gain/leg swelling  Last Clinic Visit: 9/4/2020    HPI:     Ms. Tasha Hawley is a 65 y.o. woman with history of mid LAD subtotal occlusion (1/7/2016-good collateral flow from RCA, unamenable to PCI), neurogenic bladder with chronic suprapubic catheter, Ischemic cardiomyopathy (EF recovered to 55%), chronic pain syndrome with spinal cord stimulator, chronic hypotension, chronic lower extremity edema who presents to clinic c/o weight gain and worsening leg swelling.     Her weight was 177 lbs on 10/14/20 and three days ago, her weight was 197. She states her legs are very swollen and painful. She also states her hands and face are swollen. She drinks at least 5-6 sodas a day and water in between. She does not strictly adhere to a low sodium diet. She reports continued atypical chest pain, unchanged in quality/severity since last visit. Her nuclear stress test showed an inferior fixed defect. She denies worsening shortness of breath, orthopnea, PND, palpitations, syncope.       ROS:    Constitution: Negative for decreased appetite, diaphoresis, fever, and weight loss. Positive for fatigue and weight gain  HENT: Negative for congestion, nosebleeds and sore throat.    Eyes: Negative for blurred vision, vision loss in left eye, vision loss in right eye and visual disturbance.  Cardiovascular: Negative for claudication, near-syncope, orthopnea, palpitations, paroxysmal nocturnal dyspnea and syncope. Positive for chest pain, dyspnea on exertion, and leg swelling  Respiratory: Negative for cough, hemoptysis, snoring, shortness of breath and wheezing..   Hematologic/Lymphatic: Negative for bleeding problem. Does not bruise/bleed easily.   Endocrine: Negative for polyuria  Musculoskeletal:  Positive for back pain and arthritis  Gastrointestinal: Negative for change in bowel habit, diarrhea, heartburn, hematemesis, hematochezia, melena, nausea and vomiting.  Positive for suprapubic pain  Genitourinary: Positive for bladder incontinence. Negative for hematuria and nocturia.  Neurological: Negative for extremity weakness or numbness, dizziness, and light-headedness.   Psychiatric/Behavioral: Negative for depression.   Allergic/Immunologic: Negative for hives.   PMH:     Past Medical History:   Diagnosis Date    Anticoagulant long-term use     Anxiety     Arthritis     Bilateral lower extremity edema     severe chronic    Carotid artery occlusion     Cataract     Coronary artery disease     subtotalled LAD with collateral    Depression     Fever blister     Hypothyroid     Iron deficiency anemia     Lumbar radiculopathy     with chronic pain    Ocular migraine     Sleep apnea     cpap     Past Surgical History:   Procedure Laterality Date    ADENOIDECTOMY      BACK SURGERY      x 12    CARDIAC CATHETERIZATION  2016    subtotalled LAD with right to left collaterals    CATARACT EXTRACTION W/  INTRAOCULAR LENS IMPLANT Left     Dr Coleman     CYSTOSCOPIC LITHOLAPAXY N/A 6/27/2019    Procedure: CYSTOLITHOLAPAXY;  Surgeon: Shireen Mayo MD;  Location: [facility] OR [address] [place] [address];  Service: Urology;  Laterality: N/A;    CYSTOSCOPIC LITHOLAPAXY N/A 9/3/2019    Procedure: CYSTOLITHOLAPAXY;  Surgeon: Shireen Mayo MD;  Location: [address];  Service: Urology;  Laterality: N/A;    ESOPHAGOGASTRODUODENOSCOPY N/A 5/23/2018    Procedure: ESOPHAGOGASTRODUODENOSCOPY (EGD);  Surgeon: Prince Vance MD;  Location: [facility] (4TH FLR);  Service: Endoscopy;  Laterality: N/A;  r/s 'd per Dr. Vance due to family emergency- ER    HYSTERECTOMY  1975    endometriosis    pain pump placement      SQ Dilaudid Pump managed by Dr. Hillman, Pain Management    REPLACEMENT OF CATHETER N/A 10/31/2019    Procedure: REPLACEMENT, CATHETER-SUPRAPUBIC;  Surgeon: Shireen Mayo MD;  [facility];  Service: Urology;  Laterality: N/A;    SPINAL CORD STIMULATOR  REMOVAL      before [place]    SPINE SURGERY  5-13-13    CERVICAL FUSION    TONSILLECTOMY       Allergies:     Review of patient's allergies indicates:   Allergen Reactions    (d)-limonene flavor      Other reaction(s): difficult intubation  Other reaction(s): Difficulty breathing    Bactrim [sulfamethoxazole-trimethoprim] Anaphylaxis    Benadryl [diphenhydramine hcl] Shortness Of Breath    Imitrex [sumatriptan succinate] Shortness Of Breath    Topamax [topiramate] Shortness Of Breath    Vancomycin Shortness Of Breath     Rash    Butorphanol tartrate     Darvocet a500 [propoxyphene n-acetaminophen]      Other reaction(s): Difficulty breathing    Fentanyl      Other reaction(s): Vomiting  Other reaction(s): Nausea  Other reaction(s): Itching  swelling    White petrolatum-zinc     Zinc oxide-white petrolatum      Other reaction(s): Difficulty breathing    Latex, natural rubber Itching and Rash    Phenytoin Rash and Other (See Comments)     Trouble breathing     Medications:     Current Outpatient Medications on File Prior to Visit   Medication Sig Dispense Refill    ascorbic acid, vitamin C, (VITAMIN C) 500 MG tablet Take 500 mg by mouth once daily.      aspirin (ECOTRIN) 81 MG EC tablet Take 1 tablet (81 mg total) by mouth once daily.  0    atorvastatin (LIPITOR) 80 MG tablet TAKE 1 TABLET BY MOUTH DAILY 90 tablet 3    butalbital-acetaminophen-caffeine -40 mg (FIORICET, ESGIC) -40 mg per tablet Take 1 tablet by mouth every 4 (four) hours as needed for Headaches.       FLUoxetine 20 MG capsule TAKE THREE CAPSULES BY MOUTH EVERY  capsule 1    furosemide (LASIX) 40 MG tablet Take 2 tablets every morning and one every evening (Patient taking differently: Take 2 tablets every morning or as directed) 270 tablet 3    gabapentin (NEURONTIN) 100 MG capsule Take 1 capsule (100 mg total) by mouth 3 (three) times daily. 90 capsule 2    HYDROcodone-acetaminophen (NORCO)  mg per  tablet Take 1 tablet by mouth every 6 (six) hours as needed for Pain. 15 tablet 0    intrathecal pain pump compound Hydromorphone (7.5 mg/mL) infusion at 3.6799 mg/day (0.1533 mg/hr) out of a total reservoir volume of 37.3 mL  Pump filled every 2 months      levothyroxine (SYNTHROID) 125 MCG tablet Take 1 tablet (125 mcg total) by mouth before breakfast. 90 tablet 3    methylPREDNISolone (MEDROL DOSEPACK) 4 mg tablet       nitroGLYCERIN (NITROSTAT) 0.4 MG SL tablet Place 1 tablet (0.4 mg total) under the tongue every 5 (five) minutes as needed for Chest pain. 25 tablet 3    oxybutynin (DITROPAN XL) 15 MG TR24 Take 1 tablet (15 mg total) by mouth once daily. 90 tablet 3    pantoprazole (PROTONIX) 40 MG tablet Take 1 tablet (40 mg total) by mouth once daily. 90 tablet 3    promethazine (PHENERGAN) 25 MG tablet Take 25 mg by mouth every 6 (six) hours as needed for Nausea.      QUEtiapine (SEROQUEL) 100 MG Tab TAKE 1 TABLET (100 MG TOTAL) BY MOUTH EVERY EVENING. 90 tablet 1    senna (SENNA LAX) 8.6 mg tablet Take 2 tablets by mouth 2 (two) times daily.      traZODone (DESYREL) 100 MG tablet Take 3 tablets (300 mg total) by mouth every evening. 270 tablet 1    [DISCONTINUED] cefadroxil (DURICEF) 1 gram tablet Take 1 tablet (1 g total) by mouth 2 (two) times daily. for 10 days 20 tablet 0    [DISCONTINUED] lamotrigine (LAMICTAL) 25 MG tablet Take 1 tablet (25 mg total) by mouth once daily. 30 tablet 6     No current facility-administered medications on file prior to visit.      Social History:     Social History     Tobacco Use    Smoking status: Never Smoker    Smokeless tobacco: Never Used   Substance Use Topics    Alcohol use: Never     Frequency: Never     Family History:     Family History   Problem Relation Age of Onset    Cancer Mother 55        breast    Cancer Father         esophagus,had laryngectomy    Esophageal cancer Father     Parkinsonism Maternal Grandmother     Tremor Maternal  "Grandmother     No Known Problems Brother     No Known Problems Brother     Heart disease Maternal Uncle     Colon cancer Maternal Uncle         Less than 60    No Known Problems Sister     No Known Problems Maternal Aunt     Cirrhosis Paternal Aunt         ETOH    Liver disease Paternal Aunt         ETOH    Liver disease Paternal Uncle         ETOH    Cirrhosis Paternal Uncle         ETOH    No Known Problems Maternal Grandfather     No Known Problems Paternal Grandmother     No Known Problems Paternal Grandfather     Melanoma Neg Hx     Amblyopia Neg Hx     Blindness Neg Hx     Cataracts Neg Hx     Diabetes Neg Hx     Glaucoma Neg Hx     Hypertension Neg Hx     Macular degeneration Neg Hx     Retinal detachment Neg Hx     Strabismus Neg Hx     Stroke Neg Hx     Thyroid disease Neg Hx     Celiac disease Neg Hx     Colon polyps Neg Hx     Cystic fibrosis Neg Hx     Crohn's disease Neg Hx     Inflammatory bowel disease Neg Hx     Liver cancer Neg Hx     Rectal cancer Neg Hx     Stomach cancer Neg Hx     Ulcerative colitis Neg Hx     Lymphoma Neg Hx      Physical Exam:   BP (!) 98/48 (BP Location: Left arm, Patient Position: Sitting, BP Method: Large (Automatic))   Pulse (!) 50   Ht 5' 5" (1.651 m)   Wt 86.7 kg (191 lb 2.2 oz)   LMP  (LMP Unknown)   BMI 31.81 kg/m²      Constitutional: She is oriented to person, place, and time. She appears well-developed and well-nourished.   HENT:   Head: Normocephalic and atraumatic.   Mouth/Throat: Oropharynx is clear and moist.   Eyes: Pupils are equal, round, and reactive to light. Conjunctivae and EOM are normal. No scleral icterus.   Neck: Normal range of motion. Neck supple. No hepatojugular reflux and no JVD present. No tracheal deviation present. No thyromegaly present.   Cardiovascular: Normal rate, regular rhythm, normal heart sounds and intact distal pulses. PMI is not displaced.   Pulses:       Carotid pulses are 2+ on the right " side, and 2+ on the left side.       Radial pulses are 2+ on the right side, and 2+ on the left side.   Pulmonary/Chest: Effort normal and breath sounds normal.   Abdominal: Soft. Bowel sounds are normal. She exhibits no distension and no mass. There is no hepatosplenomegaly. There is no tenderness.   Suprapubic catheter in place   Musculoskeletal: She exhibits edema and tenderness to light touch  Skin changes present consistent with chronic venous stasis changes.   Lymphadenopathy:     She has no cervical adenopathy.   Neurological: She is alert and oriented to person, place, and time.   Skin: Skin is warm and dry. No rash noted. No cyanosis or erythema. Nails show no clubbing.   Psychiatric: She has a normal mood and affect. Her speech is normal and behavior is normal.     Labs:     Lab Results   Component Value Date     09/24/2020    K 3.5 09/24/2020     09/24/2020    CO2 33 (H) 09/24/2020    BUN 9 09/24/2020    CREATININE 0.79 09/24/2020    ANIONGAP 7 (L) 09/24/2020     Lab Results   Component Value Date    HGBA1C 5.4 08/25/2016     Lab Results   Component Value Date    BNP 17 01/27/2020     (H) 02/23/2019     (H) 02/21/2019    Lab Results   Component Value Date    WBC 4.81 08/31/2020    HGB 10.9 (L) 10/13/2020    HCT 40.3 08/31/2020     08/31/2020    GRAN 3.2 08/31/2020    GRAN 65.4 08/31/2020     Lab Results   Component Value Date    CHOL 152 06/01/2020    HDL 42 06/01/2020    LDLCALC 90.0 06/01/2020    TRIG 100 06/01/2020          Imaging:   Nuclear stress test 10/14/20    The study shows abnormal myocardial perfusion.    Abnormal myocardial perfusion study.    Perfusion Defect There is a mild to moderate intensity, moderate to large sized, fixed defect consistent with scar  in the distal inferior, inferolateral and apical wall(s).    Visually estimated ejection fraction is normal at rest and normal at stress.    There is normal wall motion at rest and post stress.    LV  cavity size is normal at rest and normal at stress.    The EKG portion of this study is negative for ischemia.    The patient reported chest pain during the stress test.    There were no arrhythmias during stress.    There are no prior studies for comparison.    TTE 5/13/2020  · Normal left ventricular systolic function. The estimated ejection fraction is 55%.  · Indeterminate left ventricular diastolic function.  · No wall motion abnormalities.  · Normal right ventricular systolic function.  · Biatrial enlargement.  · Mild tricuspid regurgitation.  · The estimated PA systolic pressure is 23 mmHg.  · Normal central venous pressure (3 mmHg).     Lower extremity arterial ultrasound 3/10/2020  No evidence of hemodynamically significant infrainguinal PAD bilaterally.  Tri- and biphasic waveforms throughout.  Borderline normal KENDALL bilaterally.     Carotid ultrasound 11/30/2017  CONCLUSIONS   There is 20 - 39% right Internal Carotid stenosis.   There is 20 - 39% left Internal Carotid stenosis.      Coronary angiogran 1/7/2016  1. Single vessel coronary artery disease.   2. Subtotal mid LAD but the remainder of the LAD appears to be of small caliber and not attractive for PCI.    Assessment:     1. Chronic diastolic heart failure    2. Chronic venous insufficiency    3. Ischemic cardiomyopathy    4. Coronary artery disease involving native coronary artery of native heart without angina pectoris    5. Bilateral carotid artery stenosis    6. Pure hypercholesterolemia    7. Impaired functional mobility and activity tolerance      Plan:   Ischemic cardiomyopathy, EF now 55%  Diastolic dysfunction  - Pedal edema on exam 2/2 venous insufficiency, otherwise no presacral edema, JVD, or rales.  -Discussed diet modification including importance of low sodium/low calorie diet. She is currently has high calorie diet and extremely sedentary lifestyle   -Continue Lasix   -CXR  -BNP and CMP    Venous insufficiency  As above.     CAD  with subtotal LAD occlusion with collateral flow  -Persistent atypical chest pain. Nuclear stress test showed inferior fixed defect.   -Continue medical management with Aspirin 81 mg daily and high intensity statin   - Recommend increasing aerobic exercise as able and weight loss..      Carotid artery disease  - Asymptomatic. Mild nonobstructive stenosis bilaterally.  - Continue medical management with ASA and statin.      This patient was discussed with Dr Wade.    Signed:  Evita Chen PA-C  General Cardiology   [id number]  10/21/2020 5:15 PM

## 2020-10-21 NOTE — TELEPHONE ENCOUNTER
Sending prescription for keflex.  She will have to take it every 6 hours or 4 times a day.  It should be covered by her insurance.

## 2020-10-21 NOTE — TELEPHONE ENCOUNTER
lv 9/4/20. Liz with [facility], for wt gain, wt today 190.2lb, saw her two weeks ago 170lb, has edema to LE 3+ and uncomfortable. States does not complain of increased shortness of breath but edema appears generalized to face as well. Would like pt seen. Appt made today for jo ann.

## 2020-10-21 NOTE — TELEPHONE ENCOUNTER
----- Message from Corinna Biswas MA sent at 10/21/2020  3:23 PM CDT -----  Regarding: FW: Pt Inquiry  Liz with [facility]  states that the pt says the abx you sent to her pharmacy was $80 and she can't afford it. The nurse says the pt urine culture was positive and it has been 2 weeks since the pt has been on any abx. She is asking if there is another abx you would like to try, please advise  ----- Message -----  From: Daja Anthony  Sent: 10/21/2020  11:56 AM CDT  To: Mika Campbell Staff  Subject: Pt Inquiry                                       Liz with [facility] called inquiring on pt Urine Culture / Antibiotics       Liz [phone]

## 2020-10-21 NOTE — PATIENT INSTRUCTIONS
-Limiting your sodium (salt) intake is a key part of your treatment. You should eat no more than 2 grams (2000 mgm) of salt a day. DO NOT USE TABLE SALT! JUST ONE TEASPOON OF TABLE SALT CONTAINS ABOUT 2 GRAMS OF SODIUM. Sodium is found in many canned, pickled foods. Look at the food label. Use caution when eating out. Read the menu carefully and ask if food can be cooked without salt.     -Restrict your fluids to 1500 ml/day. This is about 48 ounces. This INCLUDES all fluids taken with your medications, at mealtime and between Meals. Include things like ice cream, pudding, soup, ice, jello and even fruits in your daily fluid total. Keep a record of your daily fluid intake and bring to your next appointment with you.     -Avoid all alcohol and use of tobacco products.     -Weigh yourself every morning using the same scale around the same time. A sudden weight gain is one sign that you might be retaining fluids. If you notice you have a WEIGHT GAIN of TWO or THREE pounds overnight or FIVE pounds in a week, take an extra fluid pill and contact your cardiologist.    -Regular exercise is important; spending long periods of time in bed can weaken you. Start activity slowly and progress little by little. Allow time to adjust. Don't overdo it. Take rest from becoming too tired.    -Exercise at the time of day when you are at your best energy level. Exercise only when feeling well. Wait 2 days after a cold or flu. Don't exercise vigorously after eating. Wait at least 2 hours. Choose appropriate exercise. Aerobic exercise should be included. Flexibility and strengthening exercises should also be considered for a well-rounded program.     -Call your doctor if you:   1. Have problems breathing at night.  2. Need to use more pillows or have to sit up to breathe at night

## 2020-10-22 ENCOUNTER — TELEPHONE (OUTPATIENT)
Dept: INTERNAL MEDICINE | Facility: CLINIC | Age: 65
End: 2020-10-22

## 2020-10-22 ENCOUNTER — TELEPHONE (OUTPATIENT)
Dept: INFECTIOUS DISEASES | Facility: CLINIC | Age: 65
End: 2020-10-22

## 2020-10-22 NOTE — TELEPHONE ENCOUNTER
Returned patient's phone call.  Left messages on both the voicemail of both numbers.  Will call back again in 5 minutes.

## 2020-10-22 NOTE — TELEPHONE ENCOUNTER
Called and spoke to the patient's .  Patient noticed some blood in her urine.  She is currently at the [employer] so I was not able to speak to her.  Will continue to monitor.

## 2020-10-22 NOTE — TELEPHONE ENCOUNTER
----- Message from Corinna Biswas MA sent at 10/22/2020  9:55 AM CDT -----  Contact: [phone] / [phone]    ----- Message -----  From: Monik Euceda  Sent: 10/22/2020   9:47 AM CDT  To: Mika Campbell Staff    Caller says she has blood in her urine, since last night.  Has another STAPH infection.    Pharmacy:  [facility] in [place], [place]

## 2020-10-22 NOTE — TELEPHONE ENCOUNTER
----- Message from Corinna Biswas MA sent at 10/22/2020  4:29 PM CDT -----  Regarding: FW: PT  Contact: PT    ----- Message -----  From: Merry Smith  Sent: 10/22/2020   3:48 PM CDT  To: Mika Campbell Staff  Subject: PT                                               PT called to speak to the doctor about having blood in her urine. Please call back     Callback: [phone]

## 2020-10-22 NOTE — TELEPHONE ENCOUNTER
----- Message from Tonie Arredondo sent at 10/22/2020 10:54 AM CDT -----  Regarding: Pt self Mobile # [phone]  Patients  Mr. Hawley is calling in regards to him wanting to get more days of home health service for his wife please. He said that he would like someone to come out once a week to see her please.

## 2020-10-22 NOTE — TELEPHONE ENCOUNTER
Needs HH orders extended for nurse visits at least once a week.  Pt had x ray and labs today         Carrie

## 2020-10-22 NOTE — TELEPHONE ENCOUNTER
Called [facility] and left a [facility] [place] [facility] nurse for pt that it is ok to provide HH to pt once a week for a month as per provider.    Carrie

## 2020-10-22 NOTE — TELEPHONE ENCOUNTER
Okay.  Can you give a verbal to her home health to have nurse visits once per week for the next month, please?  This is to assess her and help with medications.    If they need a written order, SHIRAZ and I'll do that.    D

## 2020-10-23 DIAGNOSIS — I50.32 CHRONIC DIASTOLIC HEART FAILURE: Primary | ICD-10-CM

## 2020-10-23 LAB — BACTERIA SPEC ANAEROBE CULT: NORMAL

## 2020-10-23 RX ORDER — TORSEMIDE 100 MG/1
100 TABLET ORAL DAILY
Qty: 30 TABLET | Refills: 11 | Status: SHIPPED | OUTPATIENT
Start: 2020-10-23 | End: 2021-04-22 | Stop reason: SDUPTHER

## 2020-10-23 NOTE — PROGRESS NOTES
Spoke with patient's  over the phone regarding BNP results. Will d/c Furosemide and start Torsemide 100 mg daily. Recheck BNP in 4-5 days. Reiterated the importance of fluid and sodium restriction and daily weights. She has cut down on soda (down to 3 a day) but has not stopped completely yet

## 2020-10-23 NOTE — TELEPHONE ENCOUNTER
Spoke with Liz Franz . She confirmed receipt of msg left regarding once a week visit to pt home for one month then back to once every 3 weeks       Carrie

## 2020-10-25 DIAGNOSIS — E55.9 VITAMIN D INSUFFICIENCY: Primary | ICD-10-CM

## 2020-10-25 PROCEDURE — G0179 MD RECERTIFICATION HHA PT: HCPCS | Mod: ,,, | Performed by: UROLOGY

## 2020-10-25 PROCEDURE — G0179 PR HOME HEALTH MD RECERTIFICATION: ICD-10-PCS | Mod: ,,, | Performed by: UROLOGY

## 2020-10-25 RX ORDER — ERGOCALCIFEROL 1.25 MG/1
50000 CAPSULE ORAL
Qty: 12 CAPSULE | Refills: 0 | Status: SHIPPED | OUTPATIENT
Start: 2020-10-25 | End: 2021-01-11

## 2020-10-25 RX ORDER — ACETAMINOPHEN 500 MG
1 TABLET ORAL DAILY
Qty: 90 CAPSULE | Refills: 3 | Status: SHIPPED | OUTPATIENT
Start: 2020-10-25 | End: 2021-04-22

## 2020-10-27 ENCOUNTER — TELEPHONE (OUTPATIENT)
Dept: ENDOSCOPY | Facility: HOSPITAL | Age: 65
End: 2020-10-27

## 2020-10-27 NOTE — TELEPHONE ENCOUNTER
----- Message from Prince Vance MD sent at 10/25/2020  2:25 PM CDT -----  Dahlia - Please tell patient that their Vitamin D level is low and recommend that they take Vitamin D 50,000 units ONCE A WEEK (every 7-days) for 12 weeks AND THEN start Vitamin D3 (2,000 units) over-the-counter ONCE DAILY.     Dahlia- please recheck their vitamin D level in 6 months - Orders placed.

## 2020-10-28 ENCOUNTER — EXTERNAL HOME HEALTH (OUTPATIENT)
Dept: HOME HEALTH SERVICES | Facility: HOSPITAL | Age: 65
End: 2020-10-28
Payer: MEDICARE

## 2020-11-02 ENCOUNTER — TELEPHONE (OUTPATIENT)
Dept: HEMATOLOGY/ONCOLOGY | Facility: CLINIC | Age: 65
End: 2020-11-02

## 2020-11-02 NOTE — TELEPHONE ENCOUNTER
Pt.. confirmed office visit with Dr. Augustine at 2:20pm on 11/5/20.    ----- Message from Mini Garzon sent at 11/2/2020 10:35 AM CST -----  Regarding: Reschedule  Contact: Self/ [phone]  Patient requesting to speak with you regarding getting her appointment rescheduled. Please call.

## 2020-11-05 ENCOUNTER — LAB VISIT (OUTPATIENT)
Dept: LAB | Facility: HOSPITAL | Age: 65
End: 2020-11-05
Attending: INTERNAL MEDICINE
Payer: MEDICARE

## 2020-11-05 ENCOUNTER — OFFICE VISIT (OUTPATIENT)
Dept: HEMATOLOGY/ONCOLOGY | Facility: CLINIC | Age: 65
End: 2020-11-05
Payer: MEDICARE

## 2020-11-05 VITALS
TEMPERATURE: 98 F | SYSTOLIC BLOOD PRESSURE: 107 MMHG | OXYGEN SATURATION: 98 % | RESPIRATION RATE: 18 BRPM | DIASTOLIC BLOOD PRESSURE: 65 MMHG | BODY MASS INDEX: 29.42 KG/M2 | WEIGHT: 176.81 LBS | HEART RATE: 51 BPM

## 2020-11-05 DIAGNOSIS — Z86.2 HISTORY OF IRON DEFICIENCY ANEMIA: ICD-10-CM

## 2020-11-05 DIAGNOSIS — K59.09 CONSTIPATION, CHRONIC: ICD-10-CM

## 2020-11-05 DIAGNOSIS — D63.8 ANEMIA OF CHRONIC DISEASE: ICD-10-CM

## 2020-11-05 DIAGNOSIS — D64.9 NORMOCYTIC ANEMIA: ICD-10-CM

## 2020-11-05 DIAGNOSIS — Z86.39 HISTORY OF NON ANEMIC VITAMIN B12 DEFICIENCY: ICD-10-CM

## 2020-11-05 DIAGNOSIS — D63.8 ANEMIA OF CHRONIC DISEASE: Primary | ICD-10-CM

## 2020-11-05 LAB
BASOPHILS # BLD AUTO: 0.02 K/UL (ref 0–0.2)
BASOPHILS NFR BLD: 0.4 % (ref 0–1.9)
CRP SERPL-MCNC: 1.3 MG/L (ref 0–8.2)
DIFFERENTIAL METHOD: ABNORMAL
EOSINOPHIL # BLD AUTO: 0.2 K/UL (ref 0–0.5)
EOSINOPHIL NFR BLD: 3.4 % (ref 0–8)
ERYTHROCYTE [DISTWIDTH] IN BLOOD BY AUTOMATED COUNT: 13.3 % (ref 11.5–14.5)
ERYTHROCYTE [SEDIMENTATION RATE] IN BLOOD BY WESTERGREN METHOD: 24 MM/HR (ref 0–20)
HCT VFR BLD AUTO: 36.2 % (ref 37–48.5)
HGB BLD-MCNC: 11.4 G/DL (ref 12–16)
IMM GRANULOCYTES # BLD AUTO: 0.01 K/UL (ref 0–0.04)
IMM GRANULOCYTES NFR BLD AUTO: 0.2 % (ref 0–0.5)
LYMPHOCYTES # BLD AUTO: 1.7 K/UL (ref 1–4.8)
LYMPHOCYTES NFR BLD: 35.4 % (ref 18–48)
MCH RBC QN AUTO: 27.7 PG (ref 27–31)
MCHC RBC AUTO-ENTMCNC: 31.5 G/DL (ref 32–36)
MCV RBC AUTO: 88 FL (ref 82–98)
MONOCYTES # BLD AUTO: 0.5 K/UL (ref 0.3–1)
MONOCYTES NFR BLD: 10 % (ref 4–15)
NEUTROPHILS # BLD AUTO: 2.4 K/UL (ref 1.8–7.7)
NEUTROPHILS NFR BLD: 50.6 % (ref 38–73)
NRBC BLD-RTO: 0 /100 WBC
PLATELET # BLD AUTO: 232 K/UL (ref 150–350)
PMV BLD AUTO: 10 FL (ref 9.2–12.9)
RBC # BLD AUTO: 4.11 M/UL (ref 4–5.4)
VIT B12 SERPL-MCNC: 259 PG/ML (ref 210–950)
WBC # BLD AUTO: 4.69 K/UL (ref 3.9–12.7)

## 2020-11-05 PROCEDURE — 86140 C-REACTIVE PROTEIN: CPT | Mod: HCNC

## 2020-11-05 PROCEDURE — 84238 ASSAY NONENDOCRINE RECEPTOR: CPT | Mod: HCNC

## 2020-11-05 PROCEDURE — 85025 COMPLETE CBC W/AUTO DIFF WBC: CPT | Mod: HCNC

## 2020-11-05 PROCEDURE — 99204 PR OFFICE/OUTPT VISIT, NEW, LEVL IV, 45-59 MIN: ICD-10-PCS | Mod: HCNC,S$GLB,, | Performed by: INTERNAL MEDICINE

## 2020-11-05 PROCEDURE — 85652 RBC SED RATE AUTOMATED: CPT | Mod: HCNC

## 2020-11-05 PROCEDURE — 36415 COLL VENOUS BLD VENIPUNCTURE: CPT | Mod: HCNC

## 2020-11-05 PROCEDURE — 99999 PR PBB SHADOW E&M-EST. PATIENT-LVL IV: ICD-10-PCS | Mod: PBBFAC,HCNC,, | Performed by: INTERNAL MEDICINE

## 2020-11-05 PROCEDURE — 82607 VITAMIN B-12: CPT | Mod: HCNC

## 2020-11-05 PROCEDURE — 3008F PR BODY MASS INDEX (BMI) DOCUMENTED: ICD-10-PCS | Mod: HCNC,CPTII,S$GLB, | Performed by: INTERNAL MEDICINE

## 2020-11-05 PROCEDURE — 99999 PR PBB SHADOW E&M-EST. PATIENT-LVL IV: CPT | Mod: PBBFAC,HCNC,, | Performed by: INTERNAL MEDICINE

## 2020-11-05 PROCEDURE — 3008F BODY MASS INDEX DOCD: CPT | Mod: HCNC,CPTII,S$GLB, | Performed by: INTERNAL MEDICINE

## 2020-11-05 PROCEDURE — 99204 OFFICE O/P NEW MOD 45 MIN: CPT | Mod: HCNC,S$GLB,, | Performed by: INTERNAL MEDICINE

## 2020-11-05 NOTE — PROGRESS NOTES
"PATIENT: Tasha Hawley  MRN: [id number]  DATE: 11/5/2020    Diagnosis:   1. Anemia of chronic disease    2. Constipation, chronic    3. Normocytic anemia    4. History of non anemic vitamin B12 deficiency    5. History of iron deficiency anemia        Chief Complaint: Anemia    Oncologic History:      Oncologic History     Oncologic Treatment     Pathology       Subjective:    History of Present Illness: Ms. Hawley is a 65 y.o. female who presents for evaluation and management of normocytic anemia.    - she had previously seen Dr. Ambriz. Information from his note dated 11/18/15:  "I saw her last in 2009, for iron deficiency anemia.  I had seen her   intermittently for this problem since 2006.  She has had gastrointestinal   bleeding.  Extensive investigations had not disclosed a definite site of   bleeding, although at times, she had had severe reflux esophagitis associated   with a large hiatal hernia.  She underwent a Nissen fundoplication in September 2011.     She has not been able to tolerate oral iron preparations.  They initially caused   severe constipation and more recently she says she has taken several iron   tablets, but has seen them pass in her stools.     She is not aware of any recent bleeding.  In reviewing her blood counts, it is   clear that since the fundoplication in 2011, anemia has been much less   severe or absent.  In the past, she had been given intravenous iron   infusions, but since 2011, hemoglobin values were normal until May 2013.  Since   May 2013, they have been mildly below normal, between 11.1-11.8.  The most   recent hemoglobin on 10/22/2015, was 11.6.  Iron studies on 10/01/2015, include   iron 56, total iron binding capacity 398, iron saturation 14% and ferritin 22.    B12 level was 1230.     IMPRESSION:  Iron depletion as manifested by a low normal ferritin level and   possible mild iron deficiency anemia.     RECOMMENDATIONS:  I have advised Mrs. Hawley to " "purchase NovaFerrum 125, a liquid iron preparation at a dose of one teaspoon of this every other day.  She   should take this for at least 6 months to attempt to replete her iron stores.    At that time, a blood count and ferritin level can be repeated.  I see no   indication for intravenous iron at this time or any reason to carry out further   investigation of this very mild degree of anemia."    - over the past 2 years, her hemoglobin levels have ranged between 10-12 g/dL.  - today, she endorses severe fatigue, generalized weakness. She denies bleeding. She has multiple medical conditions. She has severe chronic lower-extremity edema, possibl      Past medical, surgical, family, and social histories have been reviewed and updated below.    Past Medical History:   Past Medical History:   Diagnosis Date    Anticoagulant long-term use     Anxiety     Arthritis     Bilateral lower extremity edema     severe chronic    Carotid artery occlusion     Cataract     Coronary artery disease     subtotalled LAD with collateral    Depression     Fever blister     Hypothyroid     Iron deficiency anemia     Lumbar radiculopathy     with chronic pain    Ocular migraine     Sleep apnea     cpap       Past Surgical History:   Past Surgical History:   Procedure Laterality Date    ADENOIDECTOMY      BACK SURGERY      x 12    CARDIAC CATHETERIZATION  2016    subtotalled LAD with right to left collaterals    CATARACT EXTRACTION W/  INTRAOCULAR LENS IMPLANT Left     Dr Coleman     CYSTOSCOPIC LITHOLAPAXY N/A 6/27/2019    Procedure: CYSTOLITHOLAPAXY;  Surgeon: Shireen Mayo MD;  Location: [facility] OR [address] [place] [address];  Service: Urology;  Laterality: N/A;    CYSTOSCOPIC LITHOLAPAXY N/A 9/3/2019    Procedure: CYSTOLITHOLAPAXY;  Surgeon: Shireen Mayo MD;  Location: [facility] OR [address] [place] [address];  Service: Urology;  Laterality: N/A;    ESOPHAGOGASTRODUODENOSCOPY N/A 5/23/2018    Procedure: ESOPHAGOGASTRODUODENOSCOPY (EGD);  Surgeon: Prince" JEREMIAH Vance MD;  Location: [facility] ENDO (4TH FLR);  Service: Endoscopy;  Laterality: N/A;  r/s 'd per Dr. Vance due to family emergency- ER    HYSTERECTOMY  1975    endometriosis    pain pump placement      SQ Dilaudid Pump managed by Dr. Hillman, Pain Management    REPLACEMENT OF CATHETER N/A 10/31/2019    Procedure: REPLACEMENT, CATHETER-SUPRAPUBIC;  Surgeon: Shireen Mayo MD;  Location: [facility] OR 1ST FLR;  Service: Urology;  Laterality: N/A;    SPINAL CORD STIMULATOR REMOVAL      before [place]    SPINE SURGERY  5-13-13    CERVICAL FUSION    TONSILLECTOMY         Family History:   Family History   Problem Relation Age of Onset    Cancer Mother 55        breast    Cancer Father         esophagus,had laryngectomy    Esophageal cancer Father     Parkinsonism Maternal Grandmother     Tremor Maternal Grandmother     No Known Problems Brother     No Known Problems Brother     Heart disease Maternal Uncle     Colon cancer Maternal Uncle         Less than 60    No Known Problems Sister     No Known Problems Maternal Aunt     Cirrhosis Paternal Aunt         ETOH    Liver disease Paternal Aunt         ETOH    Liver disease Paternal Uncle         ETOH    Cirrhosis Paternal Uncle         ETOH    No Known Problems Maternal Grandfather     No Known Problems Paternal Grandmother     No Known Problems Paternal Grandfather     Melanoma Neg Hx     Amblyopia Neg Hx     Blindness Neg Hx     Cataracts Neg Hx     Diabetes Neg Hx     Glaucoma Neg Hx     Hypertension Neg Hx     Macular degeneration Neg Hx     Retinal detachment Neg Hx     Strabismus Neg Hx     Stroke Neg Hx     Thyroid disease Neg Hx     Celiac disease Neg Hx     Colon polyps Neg Hx     Cystic fibrosis Neg Hx     Crohn's disease Neg Hx     Inflammatory bowel disease Neg Hx     Liver cancer Neg Hx     Rectal cancer Neg Hx     Stomach cancer Neg Hx     Ulcerative colitis Neg Hx     Lymphoma Neg Hx        Social History:   reports that she has never smoked. She has never used smokeless tobacco. She reports that she does not drink alcohol or use drugs.    Allergies:  Review of patient's allergies indicates:   Allergen Reactions    (d)-limonene flavor      Other reaction(s): difficult intubation  Other reaction(s): Difficulty breathing    Bactrim [sulfamethoxazole-trimethoprim] Anaphylaxis    Benadryl [diphenhydramine hcl] Shortness Of Breath    Imitrex [sumatriptan succinate] Shortness Of Breath    Topamax [topiramate] Shortness Of Breath    Vancomycin Shortness Of Breath     Rash    Butorphanol tartrate     Darvocet a500 [propoxyphene n-acetaminophen]      Other reaction(s): Difficulty breathing    Fentanyl      Other reaction(s): Vomiting  Other reaction(s): Nausea  Other reaction(s): Itching  swelling    White petrolatum-zinc     Zinc oxide-white petrolatum      Other reaction(s): Difficulty breathing    Latex, natural rubber Itching and Rash    Phenytoin Rash and Other (See Comments)     Trouble breathing       Medications:  Current Outpatient Medications   Medication Sig Dispense Refill    ascorbic acid, vitamin C, (VITAMIN C) 500 MG tablet Take 500 mg by mouth once daily.      aspirin (ECOTRIN) 81 MG EC tablet Take 1 tablet (81 mg total) by mouth once daily.  0    atorvastatin (LIPITOR) 80 MG tablet TAKE 1 TABLET BY MOUTH DAILY 90 tablet 3    butalbital-acetaminophen-caffeine -40 mg (FIORICET, ESGIC) -40 mg per tablet Take 1 tablet by mouth every 4 (four) hours as needed for Headaches.       cholecalciferol, vitamin D3, (VITAMIN D3) 50 mcg (2,000 unit) Cap Take 1 capsule (2,000 Units total) by mouth once daily. 90 capsule 3    ergocalciferol (ERGOCALCIFEROL) 50,000 unit Cap Take 1 capsule (50,000 Units total) by mouth every 7 days. for 12 doses 12 capsule 0    FLUoxetine 20 MG capsule TAKE THREE CAPSULES BY MOUTH EVERY  capsule 1    gabapentin (NEURONTIN) 100 MG capsule Take 1 capsule (100  mg total) by mouth 3 (three) times daily. 90 capsule 2    HYDROcodone-acetaminophen (NORCO)  mg per tablet Take 1 tablet by mouth every 6 (six) hours as needed for Pain. 15 tablet 0    intrathecal pain pump compound Hydromorphone (7.5 mg/mL) infusion at 3.6799 mg/day (0.1533 mg/hr) out of a total reservoir volume of 37.3 mL  Pump filled every 2 months      levothyroxine (SYNTHROID) 125 MCG tablet Take 1 tablet (125 mcg total) by mouth before breakfast. 90 tablet 3    methylPREDNISolone (MEDROL DOSEPACK) 4 mg tablet       nitroGLYCERIN (NITROSTAT) 0.4 MG SL tablet Place 1 tablet (0.4 mg total) under the tongue every 5 (five) minutes as needed for Chest pain. 25 tablet 3    oxybutynin (DITROPAN XL) 15 MG TR24 Take 1 tablet (15 mg total) by mouth once daily. 90 tablet 3    pantoprazole (PROTONIX) 40 MG tablet Take 1 tablet (40 mg total) by mouth once daily. 90 tablet 3    promethazine (PHENERGAN) 25 MG tablet Take 25 mg by mouth every 6 (six) hours as needed for Nausea.      QUEtiapine (SEROQUEL) 100 MG Tab TAKE 1 TABLET (100 MG TOTAL) BY MOUTH EVERY EVENING. 90 tablet 1    senna (SENNA LAX) 8.6 mg tablet Take 2 tablets by mouth 2 (two) times daily.      torsemide (DEMADEX) 100 MG Tab Take 1 tablet (100 mg total) by mouth once daily. 30 tablet 11    traZODone (DESYREL) 100 MG tablet Take 3 tablets (300 mg total) by mouth every evening. 270 tablet 1     No current facility-administered medications for this visit.        Review of Systems   Constitutional: Positive for fatigue.   HENT: Negative for sore throat.    Eyes: Negative for visual disturbance.   Respiratory: Positive for shortness of breath. Negative for cough.    Cardiovascular: Positive for leg swelling. Negative for chest pain.   Gastrointestinal: Negative for abdominal pain, constipation, diarrhea, nausea and vomiting.   Genitourinary: Negative for dysuria.   Musculoskeletal: Positive for back pain.   Skin: Negative for rash.    Neurological: Negative for headaches.   Hematological: Negative for adenopathy.   Psychiatric/Behavioral: The patient is not nervous/anxious.        ECOG Performance Status:   ECOG SCORE 1-2          Objective:      Vitals:   Vitals:    11/05/20 1430   BP: 107/65   Pulse: (!) 51   Resp: 18   Temp: 97.5 °F (36.4 °C)   TempSrc: Oral   SpO2: 98%   Weight: 80.2 kg (176 lb 12.9 oz)     BMI: Body mass index is 29.42 kg/m².    Physical Exam  Vitals signs and nursing note reviewed.   Constitutional:       Appearance: She is well-developed.      Comments: fatigued   HENT:      Head: Normocephalic and atraumatic.   Eyes:      Pupils: Pupils are equal, round, and reactive to light.   Neck:      Musculoskeletal: Normal range of motion and neck supple.   Cardiovascular:      Rate and Rhythm: Normal rate and regular rhythm.   Pulmonary:      Effort: Pulmonary effort is normal.      Breath sounds: Normal breath sounds.   Abdominal:      General: Bowel sounds are normal.      Palpations: Abdomen is soft.   Musculoskeletal:      Right lower leg: Edema present.      Left lower leg: Edema present.      Comments: Lower legs are wrapped due to edema.   Skin:     General: Skin is warm and dry.   Neurological:      Mental Status: She is alert and oriented to person, place, and time.   Psychiatric:         Behavior: Behavior normal.         Thought Content: Thought content normal.         Judgment: Judgment normal.         Laboratory Data:  Labs have been reviewed.    Lab Results   Component Value Date    WBC 4.81 08/31/2020    HGB 10.9 (L) 10/13/2020    HCT 40.3 08/31/2020    MCV 89 08/31/2020     08/31/2020           Imaging:    Assessment:       1. Anemia of chronic disease    2. Constipation, chronic    3. Normocytic anemia    4. History of non anemic vitamin B12 deficiency    5. History of iron deficiency anemia           Plan:     1. Anemia of chronic disease / history of iron deficiency anemia / history of B12 deficiency  -  I have reviewed her chart.  - she has a history of iron deficiency anemia from gastrointestinal bleeding and has required IV iron in the past.  - over the past 2 years, her hemoglobin levels have ranged between 10-12 g/dL.  - iron studies (10/13/20) are more consistent with anemia of chronic disease than iron deficiency anemia. The decreased saturated iron in the setting of normal ferritin and total iron binding capacity suggests anemia of chronic disease.  - a component of her anemia is likely dilutional in the setting of severe volume overload.  - I have ordered a few labs for further clarification: CBC, sedimentation rate, c-reactive protein, soluble transferrin receptor, B12  - I will call her with the results of testing. If needed, I will schedule a follow-up appointment.       Jose Augustine M.D.  Hematology/Oncology  [facility] - [address], [address]  [place] [address]  Phone: [phone]  Fax: [phone]

## 2020-11-09 ENCOUNTER — TELEPHONE (OUTPATIENT)
Dept: HEMATOLOGY/ONCOLOGY | Facility: CLINIC | Age: 65
End: 2020-11-09

## 2020-11-09 LAB — STFR SERPL-MCNC: 2.6 MG/L (ref 1.8–4.6)

## 2020-11-09 NOTE — TELEPHONE ENCOUNTER
I called and reviewed the labs with her . Her labs do not suggest iron deficiency. The low-normal soluble transferrin receptor suggests anemia of chronic disease more than iron deficiency.    Jose Augustine M.D.  Hematology/Oncology  [facility] - [address], [address]  [place] [address]  Phone: [phone]  Fax: [phone]

## 2020-11-10 ENCOUNTER — TELEPHONE (OUTPATIENT)
Dept: INFECTIOUS DISEASES | Facility: CLINIC | Age: 65
End: 2020-11-10

## 2020-11-10 NOTE — TELEPHONE ENCOUNTER
----- Message from Corinna Biswas MA sent at 11/10/2020  9:06 AM CST -----  Contact: [phone]    ----- Message -----  From: Monik Kinsey  Sent: 11/10/2020   8:51 AM CST  To: [facility] Campbell Staff    Caller says she has blood in her urine, and just got off of antibiotics.   Wants to speak to Dr. Brennan.   Pls call today.    [facility]   [facility]      [phone]  .  Having problems with her hearing also.

## 2020-11-11 ENCOUNTER — TELEPHONE (OUTPATIENT)
Dept: CARDIOLOGY | Facility: CLINIC | Age: 65
End: 2020-11-11

## 2020-11-11 NOTE — TELEPHONE ENCOUNTER
Call received from Saray with [facility] reporting pt 7lb wt gain in this last week although reports pt states is taking Torsemide 100mg qd. Reports after last visit and med change had reached wt of 169.9 lb but previous wt done at previous home health visit was 179lb and then today weighed 186lb. Reports edema to arms and hands as well as dyspnea with exertion. 02 sat 93% on room air. Reports had been having unna boots x2wk so LE edema controlled or minimal. BP today 90/60. Appt made for follow up in clinic. Pt agreeable.

## 2020-11-12 ENCOUNTER — OFFICE VISIT (OUTPATIENT)
Dept: CARDIOLOGY | Facility: CLINIC | Age: 65
End: 2020-11-12
Payer: MEDICARE

## 2020-11-12 ENCOUNTER — TELEPHONE (OUTPATIENT)
Dept: ENDOSCOPY | Facility: HOSPITAL | Age: 65
End: 2020-11-12

## 2020-11-12 VITALS
OXYGEN SATURATION: 95 % | DIASTOLIC BLOOD PRESSURE: 46 MMHG | HEIGHT: 65 IN | BODY MASS INDEX: 30.71 KG/M2 | SYSTOLIC BLOOD PRESSURE: 88 MMHG | WEIGHT: 184.31 LBS | HEART RATE: 56 BPM

## 2020-11-12 DIAGNOSIS — D50.9 IRON DEFICIENCY ANEMIA, UNSPECIFIED IRON DEFICIENCY ANEMIA TYPE: ICD-10-CM

## 2020-11-12 DIAGNOSIS — I25.118 CORONARY ARTERY DISEASE OF NATIVE ARTERY OF NATIVE HEART WITH STABLE ANGINA PECTORIS: Primary | ICD-10-CM

## 2020-11-12 DIAGNOSIS — E78.00 PURE HYPERCHOLESTEROLEMIA: ICD-10-CM

## 2020-11-12 DIAGNOSIS — N31.9 NEUROGENIC BLADDER: Chronic | ICD-10-CM

## 2020-11-12 DIAGNOSIS — E66.09 CLASS 1 OBESITY DUE TO EXCESS CALORIES WITH SERIOUS COMORBIDITY AND BODY MASS INDEX (BMI) OF 30.0 TO 30.9 IN ADULT: ICD-10-CM

## 2020-11-12 DIAGNOSIS — I65.23 BILATERAL CAROTID ARTERY STENOSIS: ICD-10-CM

## 2020-11-12 DIAGNOSIS — E03.9 PRIMARY HYPOTHYROIDISM: Chronic | ICD-10-CM

## 2020-11-12 DIAGNOSIS — I50.32 CHRONIC DIASTOLIC HEART FAILURE: ICD-10-CM

## 2020-11-12 PROCEDURE — 99999 PR PBB SHADOW E&M-EST. PATIENT-LVL V: CPT | Mod: PBBFAC,HCNC,GC, | Performed by: INTERNAL MEDICINE

## 2020-11-12 PROCEDURE — 99999 PR PBB SHADOW E&M-EST. PATIENT-LVL V: ICD-10-PCS | Mod: PBBFAC,HCNC,GC, | Performed by: INTERNAL MEDICINE

## 2020-11-12 PROCEDURE — 99214 OFFICE O/P EST MOD 30 MIN: CPT | Mod: HCNC,GC,S$GLB, | Performed by: INTERNAL MEDICINE

## 2020-11-12 PROCEDURE — 99214 PR OFFICE/OUTPT VISIT, EST, LEVL IV, 30-39 MIN: ICD-10-PCS | Mod: HCNC,GC,S$GLB, | Performed by: INTERNAL MEDICINE

## 2020-11-12 RX ORDER — POTASSIUM CHLORIDE 750 MG/1
20 CAPSULE, EXTENDED RELEASE ORAL ONCE
COMMUNITY
Start: 2020-11-08 | End: 2021-11-15 | Stop reason: CLARIF

## 2020-11-12 RX ORDER — LIDOCAINE 50 MG/G
2 PATCH TOPICAL DAILY PRN
Status: ON HOLD | COMMUNITY
Start: 2020-11-05 | End: 2022-09-18

## 2020-11-12 NOTE — TELEPHONE ENCOUNTER
----- Message from Sekou Escobar sent at 11/12/2020 10:29 AM CST -----  Pt is requesting a call back from the office,please call back.     Contact Info [phone] (home) [phone] (work)

## 2020-11-12 NOTE — TELEPHONE ENCOUNTER
Patient and  said that she saw the Hematologist. He told her that she does not seem to be anemia. She wants to cancel the colonoscopy, but still have EGD due to trouble swallowing.

## 2020-11-12 NOTE — PROGRESS NOTES
Patient MRN: [id number]  Patient : 1955  PCP: Mesfin Hodges Ii, MD  Reason for Visit: volume overload  CC: No chief complaint on file.       History of Present Illness:   Tasha Hawley is a 65 y.o. y.o. female who presents for consultation for volume overload.     This is a new patient for me presenting with a new problem of diastolic CHF. Her other chronic medical conditions include CAD, PAD, neurogenic bladder, hypothyroidism, anemia.     Patient seen for follow-up last seen 1 month ago.  At that time she was switched from Lasix to torsemide 100 mg daily.  She notes compliance with this medication.  Her weight is down from 191 to 184 lb.  She is wrapping her legs bilaterally.  She notes increasing lower extremity edema and associated shortness of breath.  She admits to drinking 5-6 0.5 L water bottles per day in addition to 7 cans of 7 up and a court of juice daily.  She notes dry mouth and increased thirst the reason for drinking an excessive amount of fluid.  Overall she continues to have confusion as to her fluid medications.    She has chronic hypotension.  She denies lightheadedness or dizziness.  She presents today via wheelchair but knows that she can walk  Patient is obese.  She is not adhering to a diet and is taking and high sugar is foods.  She is not exercising.    She has chronic pain that is managed with pain management.  She has a Dilaudid PCA pump.    This is the extent of the patient's complaints at this time. Patient queried and denies any further complaint.     Past Medical History:     Past Medical History:   Diagnosis Date    Anticoagulant long-term use     Anxiety     Arthritis     Bilateral lower extremity edema     severe chronic    Carotid artery occlusion     Cataract     Coronary artery disease     subtotalled LAD with collateral    Depression     Fever blister     Hypothyroid     Iron deficiency anemia     Lumbar radiculopathy     with chronic pain    Ocular  migraine     Sleep apnea     cpap       Past Surgical History:     Past Surgical History:   Procedure Laterality Date    ADENOIDECTOMY      BACK SURGERY      x 12    CARDIAC CATHETERIZATION  2016    subtotalled LAD with right to left collaterals    CATARACT EXTRACTION W/  INTRAOCULAR LENS IMPLANT Left     Dr Coleman     CYSTOSCOPIC LITHOLAPAXY N/A 6/27/2019    Procedure: CYSTOLITHOLAPAXY;  Surgeon: Shireen Mayo MD;  Location: [facility] OR 2ND FLR;  Service: Urology;  Laterality: N/A;    CYSTOSCOPIC LITHOLAPAXY N/A 9/3/2019    Procedure: CYSTOLITHOLAPAXY;  Surgeon: Shireen Mayo MD;  Location: [facility] OR 2ND FLR;  Service: Urology;  Laterality: N/A;    ESOPHAGOGASTRODUODENOSCOPY N/A 5/23/2018    Procedure: ESOPHAGOGASTRODUODENOSCOPY (EGD);  Surgeon: Prince Vance MD;  Location: [facility] (4TH FLR);  Service: Endoscopy;  Laterality: N/A;  r/s 'd per Dr. Vance due to family emergency- ER    HYSTERECTOMY  1975    endometriosis    pain pump placement      SQ Dilaudid Pump managed by Dr. Hillman, Pain Management    REPLACEMENT OF CATHETER N/A 10/31/2019    Procedure: REPLACEMENT, CATHETER-SUPRAPUBIC;  Surgeon: Shireen Mayo MD;  Location: [facility] OR 1ST FLR;  Service: Urology;  Laterality: N/A;    SPINAL CORD STIMULATOR REMOVAL      before [place]    SPINE SURGERY  5-13-13    CERVICAL FUSION    TONSILLECTOMY         Social History:     Social History     Tobacco Use    Smoking status: Never Smoker    Smokeless tobacco: Never Used   Substance Use Topics    Alcohol use: Never     Frequency: Never       Family History:     Family History   Problem Relation Age of Onset    Cancer Mother 55        breast    Cancer Father         esophagus,had laryngectomy    Esophageal cancer Father     Parkinsonism Maternal Grandmother     Tremor Maternal Grandmother     No Known Problems Brother     No Known Problems Brother     Heart disease Maternal Uncle     Colon cancer Maternal Uncle         Less than  60    No Known Problems Sister     No Known Problems Maternal Aunt     Cirrhosis Paternal Aunt         ETOH    Liver disease Paternal Aunt         ETOH    Liver disease Paternal Uncle         ETOH    Cirrhosis Paternal Uncle         ETOH    No Known Problems Maternal Grandfather     No Known Problems Paternal Grandmother     No Known Problems Paternal Grandfather     Melanoma Neg Hx     Amblyopia Neg Hx     Blindness Neg Hx     Cataracts Neg Hx     Diabetes Neg Hx     Glaucoma Neg Hx     Hypertension Neg Hx     Macular degeneration Neg Hx     Retinal detachment Neg Hx     Strabismus Neg Hx     Stroke Neg Hx     Thyroid disease Neg Hx     Celiac disease Neg Hx     Colon polyps Neg Hx     Cystic fibrosis Neg Hx     Crohn's disease Neg Hx     Inflammatory bowel disease Neg Hx     Liver cancer Neg Hx     Rectal cancer Neg Hx     Stomach cancer Neg Hx     Ulcerative colitis Neg Hx     Lymphoma Neg Hx        Allergies:     Review of patient's allergies indicates:   Allergen Reactions    (d)-limonene flavor      Other reaction(s): difficult intubation  Other reaction(s): Difficulty breathing    Bactrim [sulfamethoxazole-trimethoprim] Anaphylaxis    Benadryl [diphenhydramine hcl] Shortness Of Breath    Imitrex [sumatriptan succinate] Shortness Of Breath    Topamax [topiramate] Shortness Of Breath    Vancomycin Shortness Of Breath     Rash    Butorphanol tartrate     Darvocet a500 [propoxyphene n-acetaminophen]      Other reaction(s): Difficulty breathing    Fentanyl      Other reaction(s): Vomiting  Other reaction(s): Nausea  Other reaction(s): Itching  swelling    White petrolatum-zinc     Zinc oxide-white petrolatum      Other reaction(s): Difficulty breathing    Latex, natural rubber Itching and Rash    Phenytoin Rash and Other (See Comments)     Trouble breathing       Review of Systems:   Review of Systems   Constitutional: Negative for chills, fever and weight loss.   HENT:  Negative for hearing loss and sore throat.    Eyes: Negative for blurred vision.   Respiratory: Negative for cough and shortness of breath.    Cardiovascular: Positive for orthopnea, leg swelling and PND. Negative for chest pain and palpitations.   Gastrointestinal: Negative for abdominal pain, constipation, diarrhea, heartburn, nausea and vomiting.   Genitourinary: Negative for dysuria.   Musculoskeletal: Negative for neck pain.   Skin: Negative for rash.   Neurological: Negative for dizziness and headaches.   Endo/Heme/Allergies: Does not bruise/bleed easily.   Psychiatric/Behavioral: Negative for substance abuse.       All other systems reviewed and are negative.   Medications:     Current Outpatient Medications on File Prior to Visit   Medication Sig Dispense Refill    ascorbic acid, vitamin C, (VITAMIN C) 500 MG tablet Take 500 mg by mouth once daily.      aspirin (ECOTRIN) 81 MG EC tablet Take 1 tablet (81 mg total) by mouth once daily.  0    atorvastatin (LIPITOR) 80 MG tablet TAKE 1 TABLET BY MOUTH DAILY 90 tablet 3    butalbital-acetaminophen-caffeine -40 mg (FIORICET, ESGIC) -40 mg per tablet Take 1 tablet by mouth every 4 (four) hours as needed for Headaches.       cholecalciferol, vitamin D3, (VITAMIN D3) 50 mcg (2,000 unit) Cap Take 1 capsule (2,000 Units total) by mouth once daily. 90 capsule 3    ergocalciferol (ERGOCALCIFEROL) 50,000 unit Cap Take 1 capsule (50,000 Units total) by mouth every 7 days. for 12 doses 12 capsule 0    FLUoxetine 20 MG capsule TAKE THREE CAPSULES BY MOUTH EVERY  capsule 1    gabapentin (NEURONTIN) 100 MG capsule Take 1 capsule (100 mg total) by mouth 3 (three) times daily. 90 capsule 2    HYDROcodone-acetaminophen (NORCO)  mg per tablet Take 1 tablet by mouth every 6 (six) hours as needed for Pain. 15 tablet 0    intrathecal pain pump compound Hydromorphone (7.5 mg/mL) infusion at 3.6799 mg/day (0.1533 mg/hr) out of a total reservoir  volume of 37.3 mL  Pump filled every 2 months      levothyroxine (SYNTHROID) 125 MCG tablet Take 1 tablet (125 mcg total) by mouth before breakfast. 90 tablet 3    methylPREDNISolone (MEDROL DOSEPACK) 4 mg tablet       nitroGLYCERIN (NITROSTAT) 0.4 MG SL tablet Place 1 tablet (0.4 mg total) under the tongue every 5 (five) minutes as needed for Chest pain. 25 tablet 3    oxybutynin (DITROPAN XL) 15 MG TR24 Take 1 tablet (15 mg total) by mouth once daily. 90 tablet 3    pantoprazole (PROTONIX) 40 MG tablet Take 1 tablet (40 mg total) by mouth once daily. 90 tablet 3    promethazine (PHENERGAN) 25 MG tablet Take 25 mg by mouth every 6 (six) hours as needed for Nausea.      QUEtiapine (SEROQUEL) 100 MG Tab TAKE 1 TABLET (100 MG TOTAL) BY MOUTH EVERY EVENING. 90 tablet 1    senna (SENNA LAX) 8.6 mg tablet Take 2 tablets by mouth 2 (two) times daily.      torsemide (DEMADEX) 100 MG Tab Take 1 tablet (100 mg total) by mouth once daily. 30 tablet 11    traZODone (DESYREL) 100 MG tablet Take 3 tablets (300 mg total) by mouth every evening. 270 tablet 1    [DISCONTINUED] lamotrigine (LAMICTAL) 25 MG tablet Take 1 tablet (25 mg total) by mouth once daily. 30 tablet 6     No current facility-administered medications on file prior to visit.        Physical Exam:   LMP  (LMP Unknown)   Physical Exam   Constitutional: She is oriented to person, place, and time and well-developed, well-nourished, and in no distress.   HENT:   Head: Normocephalic and atraumatic.   Eyes: Conjunctivae are normal.   Neck: Normal range of motion. Neck supple.   Cardiovascular: Normal rate, regular rhythm and normal heart sounds. Exam reveals no gallop and no friction rub.   No murmur heard.  Pulmonary/Chest: Effort normal and breath sounds normal. No respiratory distress. She has no wheezes. She has no rales. She exhibits no tenderness.   Abdominal: Soft. Bowel sounds are normal. She exhibits no distension and no mass. There is no abdominal  tenderness. There is no rebound and no guarding.   Musculoskeletal: Normal range of motion.         General: Edema (B/L LE edema) present.   Neurological: She is alert and oriented to person, place, and time.   Skin: Skin is warm and dry.   Psychiatric: Mood normal.   Vitals reviewed.       Labs:     Lab Results   Component Value Date    WBC 4.69 11/05/2020    HGB 11.4 (L) 11/05/2020    HCT 36.2 (L) 11/05/2020     11/05/2020    GRAN 2.4 11/05/2020    GRAN 50.6 11/05/2020        Chemistry        Component Value Date/Time     10/22/2020 1045    K 4.2 10/22/2020 1045     10/22/2020 1045    CO2 33 (H) 10/22/2020 1045    BUN 8 10/22/2020 1045    CREATININE 0.88 10/22/2020 1045     (H) 10/22/2020 1045        Component Value Date/Time    CALCIUM 9.0 10/22/2020 1045    ALKPHOS 101 10/22/2020 1045    AST 18 10/22/2020 1045    ALT 15 10/22/2020 1045    BILITOT 0.6 10/22/2020 1045    ESTGFRAFRICA >60.0 10/22/2020 1045    EGFRNONAA >60.0 10/22/2020 1045          Lab Results   Component Value Date    ALT 15 10/22/2020    AST 18 10/22/2020    GGT 51 04/30/2015    ALKPHOS 101 10/22/2020    BILITOT 0.6 10/22/2020      Lab Results   Component Value Date    HGBA1C 5.4 08/25/2016     Lab Results   Component Value Date     (H) 10/22/2020    BNP 17 01/27/2020     (H) 02/23/2019     Lab Results   Component Value Date    CHOL 152 06/01/2020    HDL 42 06/01/2020    LDLCALC 90.0 06/01/2020    TRIG 100 06/01/2020     Lab Results   Component Value Date    INR 0.9 05/27/2014    INR 1.0 05/01/2013    INR 0.9 06/23/2012        I have reviewed all pertinent labs within the past 24 hours.    Cardiovascular Imaging:   Echo: No results found for: EF  2D echo with color flow doppler: No results found. However, due to the size of the patient record, not all encounters were searched. Please check Results Review for a complete set of results. and Transthoracic echo (TTE) complete (Cupid Only):   Results for orders  placed or performed during the hospital encounter of 05/12/20   Echo Color Flow Doppler? Yes   Result Value Ref Range    BSA 1.91 m2    TDI SEPTAL 0.06 m/s    LV LATERAL E/E' RATIO 10.55 m/s    LV SEPTAL E/E' RATIO 19.33 m/s    LA WIDTH 4.18 cm    TDI LATERAL 0.11 m/s    LVIDd 4.10 3.5 - 6.0 cm    IVS 0.67 0.6 - 1.1 cm    Posterior Wall 0.79 0.6 - 1.1 cm    LVIDs 2.86 2.1 - 4.0 cm    FS 30 28 - 44 %    LA volume 82.57 cm3    Sinus 2.70 cm    STJ 2.93 cm    Ascending aorta 2.86 cm    LV mass 86.26 g    LA size 4.13 cm    RVDD 3.81 cm    TAPSE 2.36 cm    Left Ventricle Relative Wall Thickness 0.39 cm    AV mean gradient 4 mmHg    AV valve area 2.11 cm2    AV Velocity Ratio 0.57     AV index (prosthetic) 0.65     E/A ratio 1.61     Mean e' 0.09 m/s    E wave decelartion time 249.96 msec    LVOT diameter 2.03 cm    LVOT area 3.2 cm2    LVOT peak jorge 0.78 m/s    LVOT peak VTI 22.96 cm    Ao peak jorge 1.38 m/s    Ao VTI 35.23 cm    LVOT stroke volume 74.27 cm3    AV peak gradient 8 mmHg    E/E' ratio 13.65 m/s    MV Peak E Jorge 1.16 m/s    TR Max Jorge 2.21 m/s    MV Peak A Jorge 0.72 m/s    LV Systolic Volume 31.01 mL    LV Systolic Volume Index 16.4 mL/m2    LV Diastolic Volume 74.22 mL    LV Diastolic Volume Index 39.33 mL/m2    LA Volume Index 43.8 mL/m2    LV Mass Index 46 g/m2    RA Major Axis 5.12 cm    Left Atrium Minor Axis 5.50 cm    Left Atrium Major Axis 5.76 cm    Triscuspid Valve Regurgitation Peak Gradient 20 mmHg    RA Width 4.06 cm    Right Atrial Pressure (from IVC) 3 mmHg    TV rest pulmonary artery pressure 23 mmHg    Narrative    · Normal left ventricular systolic function. The estimated ejection   fraction is 55%.  · Indeterminate left ventricular diastolic function.  · No wall motion abnormalities.  · Normal right ventricular systolic function.  · Biatrial enlargement.  · Mild tricuspid regurgitation.  · The estimated PA systolic pressure is 23 mmHg.  · Normal central venous pressure (3 mmHg).             Test(s) Reviewed  I have reviewed the following in detail:  [] Stress test   [] Angiography   [x] Echocardiogram   [x] Labs   [] Other:       Assessment & Plan:   Ischemic cardiomyopathy, EF now 55%  Chronic diastolic CHF  - Pedal edema on exam 2/2 venous insufficiency, otherwise no presacral edema, JVD, or rales.  -Discussed diet modification including importance of low sodium/low calorie diet. She is currently has high calorie diet and extremely sedentary lifestyle   -Continue Torsemide  - Encouraged limit fluid intake to 2L daily  - Consider Spironolactone in the future.     Coronary artery disease of native artery of native heart with stable angina pectoris  -Continue medical management with Aspirin 81 mg daily and high intensity statin     Pure hypercholesterolemia  -Continue medical management with Aspirin 81 mg daily and high intensity statin     Neurogenic bladder  - management per primary care    Iron deficiency anemia, unspecified iron deficiency anemia type  - management per primary care    Primary hypothyroidism  - management per primary care    Class 1 obesity due to excess calories with serious comorbidity and body mass index (BMI) of 30.0 to 30.9 in adult  - Encouraged diet and exercise            Carotid artery disease  - Asymptomatic. Mild nonobstructive stenosis bilaterally.  - Continue medical management with ASA and statin.      The 10-year ASCVD risk score (Sariah BAKARI Jr., et al., 2013) is: 3.4%    Values used to calculate the score:      Age: 65 years      Sex: Female      Is Non- : No      Diabetic: No      Tobacco smoker: No      Systolic Blood Pressure: 107 mmHg      Is BP treated: No      HDL Cholesterol: 42 mg/dL      Total Cholesterol: 152 mg/dL       Patient was discussed with  who agrees with the assessment and plan as above.    Pete Ardon - Pager# [phone]  11/12/2020  7:39 AM  Cardiovascular Fellow  [facility]

## 2020-11-13 NOTE — TELEPHONE ENCOUNTER
MD Parvin Roman MA   Caller: Unspecified (Yesterday,  2:50 PM)             Please ask her when her last colonoscopy was cause it looks like it was in 2008 so she should have a colonoscopy for screening at the time of her EGD      Spoke with patient's . He is unsure if she will want to do colonoscopy. He will discuss with her and let us know.

## 2020-11-18 ENCOUNTER — TELEPHONE (OUTPATIENT)
Dept: UROLOGY | Facility: CLINIC | Age: 65
End: 2020-11-18

## 2020-11-18 DIAGNOSIS — N39.0 RECURRENT UTI: Primary | ICD-10-CM

## 2020-11-18 NOTE — TELEPHONE ENCOUNTER
Returned call to  nurse Saray. States pt c/o bladder infection with s/s of foul smelling urine and her usual bladder pain and pressure. Asked that order be placed in Epic so that results can be viewed sooner by provider. Order done.

## 2020-11-18 NOTE — TELEPHONE ENCOUNTER
----- Message from Lisa Bryson sent at 11/18/2020 11:49 AM CST -----  Pt is having signs of Uti and foul smell with urine states she would like dr to place order for culture please call nurse neel [phone]

## 2020-11-23 ENCOUNTER — OFFICE VISIT (OUTPATIENT)
Dept: OTOLARYNGOLOGY | Facility: CLINIC | Age: 65
End: 2020-11-23
Payer: MEDICARE

## 2020-11-23 ENCOUNTER — CLINICAL SUPPORT (OUTPATIENT)
Dept: AUDIOLOGY | Facility: CLINIC | Age: 65
End: 2020-11-23
Payer: MEDICARE

## 2020-11-23 DIAGNOSIS — H91.8X3 ASYMMETRICAL HEARING LOSS, UNSPECIFIED LATERALITY: Primary | ICD-10-CM

## 2020-11-23 DIAGNOSIS — H90.3 SENSORINEURAL HEARING LOSS (SNHL) OF BOTH EARS: Primary | ICD-10-CM

## 2020-11-23 PROCEDURE — 99213 OFFICE O/P EST LOW 20 MIN: CPT | Mod: HCNC,S$GLB,, | Performed by: OTOLARYNGOLOGY

## 2020-11-23 PROCEDURE — 1126F PR PAIN SEVERITY QUANTIFIED, NO PAIN PRESENT: ICD-10-PCS | Mod: HCNC,S$GLB,, | Performed by: OTOLARYNGOLOGY

## 2020-11-23 PROCEDURE — 92557 PR COMPREHENSIVE HEARING TEST: ICD-10-PCS | Mod: HCNC,S$GLB,, | Performed by: AUDIOLOGIST

## 2020-11-23 PROCEDURE — 92567 TYMPANOMETRY: CPT | Mod: HCNC,S$GLB,, | Performed by: AUDIOLOGIST

## 2020-11-23 PROCEDURE — 92557 COMPREHENSIVE HEARING TEST: CPT | Mod: HCNC,S$GLB,, | Performed by: AUDIOLOGIST

## 2020-11-23 PROCEDURE — 92567 PR TYMPA2METRY: ICD-10-PCS | Mod: HCNC,S$GLB,, | Performed by: AUDIOLOGIST

## 2020-11-23 PROCEDURE — 99999 PR PBB SHADOW E&M-EST. PATIENT-LVL III: ICD-10-PCS | Mod: PBBFAC,HCNC,, | Performed by: OTOLARYNGOLOGY

## 2020-11-23 PROCEDURE — 99213 PR OFFICE/OUTPT VISIT, EST, LEVL III, 20-29 MIN: ICD-10-PCS | Mod: HCNC,S$GLB,, | Performed by: OTOLARYNGOLOGY

## 2020-11-23 PROCEDURE — 1126F AMNT PAIN NOTED NONE PRSNT: CPT | Mod: HCNC,S$GLB,, | Performed by: OTOLARYNGOLOGY

## 2020-11-23 PROCEDURE — 99999 PR PBB SHADOW E&M-EST. PATIENT-LVL I: CPT | Mod: PBBFAC,HCNC,, | Performed by: AUDIOLOGIST

## 2020-11-23 PROCEDURE — 99999 PR PBB SHADOW E&M-EST. PATIENT-LVL III: CPT | Mod: PBBFAC,HCNC,, | Performed by: OTOLARYNGOLOGY

## 2020-11-23 PROCEDURE — 99999 PR PBB SHADOW E&M-EST. PATIENT-LVL I: ICD-10-PCS | Mod: PBBFAC,HCNC,, | Performed by: AUDIOLOGIST

## 2020-11-23 NOTE — PROGRESS NOTES
Otology/Neurotology Consultation Report       Chief Complaint: worsening hearing loss    History of Present Illness: 65 y.o.  female presents with worsening hearing loss. 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 Has dysarthria, which she also reports is lifelong (chart review mentions h/o CVA which she denies). She has always had difficulty hearing on the left. Wore a hearing aid for some time, however recently feels it is not helping her at all. No history of ear surgery.    Review of Systems   Constitutional: Negative for chills and fever.   HENT: Positive for hearing loss. Negative for ear discharge, ear pain, sore throat and tinnitus.    Eyes: Negative for double vision and photophobia.   Respiratory: Negative for cough, wheezing and stridor.    Cardiovascular: Negative for chest pain and palpitations.   Gastrointestinal: Negative for nausea and vomiting.   Genitourinary: Negative for dysuria and urgency.   Musculoskeletal: Negative for myalgias and neck pain.   Skin: Negative for itching and rash.   Neurological: Negative for dizziness and headaches.          Past Medical History: Patient has a past medical history of Anticoagulant long-term use, Anxiety, Arthritis, Bilateral lower extremity edema, Carotid artery occlusion, Cataract, Coronary artery disease, Depression, Fever blister, Hypothyroid, Iron deficiency anemia, Lumbar radiculopathy, Ocular migraine, and Sleep apnea.    Past Surgical History: Patient has a past surgical history that includes Hysterectomy (1975); Back surgery; pain pump placement; Spine surgery (5-13-13); Cataract extraction w/  intraocular lens implant (Left); Spinal cord stimulator removal; Esophagogastroduodenoscopy (N/A, 5/23/2018); Tonsillectomy; Adenoidectomy; Cardiac catheterization (2016); Cystoscopic litholapaxy (N/A, 6/27/2019); Cystoscopic litholapaxy (N/A, 9/3/2019); and Replacement of catheter (N/A,  10/31/2019).    Social History: Patient reports that she has never smoked. She has never used smokeless tobacco. She reports that she does not drink alcohol or use drugs.    Family History: family history includes Cancer in her father; Cancer (age of onset: 55) in her mother; Cirrhosis in her paternal aunt and paternal uncle; Colon cancer in her maternal uncle; Esophageal cancer in her father; Heart disease in her maternal uncle; Liver disease in her paternal aunt and paternal uncle; No Known Problems in her brother, brother, maternal aunt, maternal grandfather, paternal grandfather, paternal grandmother, and sister; Parkinsonism in her maternal grandmother; Tremor in her maternal grandmother.    Medications:   Current Outpatient Medications on File Prior to Visit   Medication Sig Dispense Refill    ascorbic acid, vitamin C, (VITAMIN C) 500 MG tablet Take 500 mg by mouth once daily.      atorvastatin (LIPITOR) 80 MG tablet TAKE 1 TABLET BY MOUTH DAILY 90 tablet 3    butalbital-acetaminophen-caffeine -40 mg (FIORICET, ESGIC) -40 mg per tablet Take 1 tablet by mouth every 4 (four) hours as needed for Headaches.       cholecalciferol, vitamin D3, (VITAMIN D3) 50 mcg (2,000 unit) Cap Take 1 capsule (2,000 Units total) by mouth once daily. 90 capsule 3    ergocalciferol (ERGOCALCIFEROL) 50,000 unit Cap Take 1 capsule (50,000 Units total) by mouth every 7 days. for 12 doses 12 capsule 0    FLUoxetine 20 MG capsule TAKE THREE CAPSULES BY MOUTH EVERY  capsule 1    gabapentin (NEURONTIN) 100 MG capsule Take 1 capsule (100 mg total) by mouth 3 (three) times daily. 90 capsule 2    HYDROcodone-acetaminophen (NORCO)  mg per tablet Take 1 tablet by mouth every 6 (six) hours as needed for Pain. 15 tablet 0    intrathecal pain pump compound Hydromorphone (7.5 mg/mL) infusion at 3.6799 mg/day (0.1533 mg/hr) out of a total reservoir volume of 37.3 mL  Pump filled every 2 months      levothyroxine  (SYNTHROID) 125 MCG tablet Take 1 tablet (125 mcg total) by mouth before breakfast. 90 tablet 3    lidocaine (LIDODERM) 5 %       methylPREDNISolone (MEDROL DOSEPACK) 4 mg tablet       nitroGLYCERIN (NITROSTAT) 0.4 MG SL tablet Place 1 tablet (0.4 mg total) under the tongue every 5 (five) minutes as needed for Chest pain. 25 tablet 3    oxybutynin (DITROPAN XL) 15 MG TR24 Take 1 tablet (15 mg total) by mouth once daily. 90 tablet 3    pantoprazole (PROTONIX) 40 MG tablet Take 1 tablet (40 mg total) by mouth once daily. 90 tablet 3    potassium chloride (MICRO-K) 10 MEQ CpSR       promethazine (PHENERGAN) 25 MG tablet Take 25 mg by mouth every 6 (six) hours as needed for Nausea.      QUEtiapine (SEROQUEL) 100 MG Tab TAKE 1 TABLET (100 MG TOTAL) BY MOUTH EVERY EVENING. 90 tablet 1    senna (SENNA LAX) 8.6 mg tablet Take 2 tablets by mouth 2 (two) times daily.      torsemide (DEMADEX) 100 MG Tab Take 1 tablet (100 mg total) by mouth once daily. 30 tablet 11    traZODone (DESYREL) 100 MG tablet Take 3 tablets (300 mg total) by mouth every evening. 270 tablet 1    aspirin (ECOTRIN) 81 MG EC tablet Take 1 tablet (81 mg total) by mouth once daily.  0    [DISCONTINUED] lamotrigine (LAMICTAL) 25 MG tablet Take 1 tablet (25 mg total) by mouth once daily. 30 tablet 6     No current facility-administered medications on file prior to visit.          Allergies: Patient is allergic to (d)-limonene flavor; bactrim [sulfamethoxazole-trimethoprim]; benadryl [diphenhydramine hcl]; imitrex [sumatriptan succinate]; topamax [topiramate]; vancomycin; butorphanol tartrate; darvocet a500 [propoxyphene n-acetaminophen]; fentanyl; white petrolatum-zinc; zinc oxide-white petrolatum; latex, natural rubber; and phenytoin.    Physical Exam   Constitutional: She is oriented to person, place, and time and well-developed, well-nourished, and in no distress.   HENT:   Head: Normocephalic and atraumatic.   Right Ear: Tympanic membrane,  external ear and ear canal normal.   Left Ear: Tympanic membrane, external ear and ear canal normal.   Nose: Nose normal. No mucosal edema or rhinorrhea.   Mouth/Throat: Uvula is midline, oropharynx is clear and moist and mucous membranes are normal.   HB II/VI on right side    Procedure Note:    The patient was brought to the minor procedure room and placed under the operating microscope. Using a combination of suction, curettes and cup forceps the patient's cerumen impaction was removed. The tympanic membrane was evaluated and was unremarkable. The patient tolerated the procedure well. There were no complications.     Eyes: Pupils are equal, round, and reactive to light. EOM are normal.   Neck: Normal range of motion. Neck supple.   Pulmonary/Chest: Effort normal. No respiratory distress.   Neurological: She is oriented to person, place, and time.   Skin: Skin is warm and dry.     CT of Head WNL for CPA lesions.                                 Tasha was seen today for hearing loss.    Diagnoses and all orders for this visit:    Asymmetrical hearing loss, unspecified laterality      Poor CI cand due to hearing, MMP.      P: Patient to see Audiology for hearing aid evaluation.    F/U 2 yrs.

## 2020-11-23 NOTE — PROGRESS NOTES
Tasha Hawley was seen today in the clinic for an audiologic evaluation.  Patients main complaint was decreased hearing of the left ear.  Mrs. Hawley reported a history of right sided hearing loss since birth. Mrs. Hawley reported that she has a Widex hearing aid that she has not worn for the past several years.    Tympanometry revealed Type A in the right ear and Type A in the left ear. Audiogram results revealed profound sensorineural hearing loss (250-8000 Hz) in the right ear and severe to profound sensorineural hearing loss (250-8000 Hz) in the left ear.  Speech reception thresholds were noted at unable to be obtained in the right ear and 55 dB in the left ear.  Speech discrimination scores were unable to be obtained in the right ear and 52% in the left ear.    Recommendations:  1. Otologic evaluation  2. Annual audiogram  3. Noise protection when in noise  4. Hearing aid consultation to discuss newer hearing aid technology

## 2020-11-27 ENCOUNTER — TELEPHONE (OUTPATIENT)
Dept: PEDIATRIC PULMONOLOGY | Facility: CLINIC | Age: 65
End: 2020-11-27

## 2020-11-27 NOTE — TELEPHONE ENCOUNTER
Called to check on the patient since they had missed their appointment. Spoke to patient's , Mr. Pierson, who handles all of Mrs. Cordova's appointments. He was unaware of the scheduled COVID test. He asked to please switch over to PCW to do drive thru for 2:30. Patient was rescheduled as requested.

## 2020-11-30 ENCOUNTER — TELEPHONE (OUTPATIENT)
Dept: ENDOSCOPY | Facility: HOSPITAL | Age: 65
End: 2020-11-30

## 2020-11-30 NOTE — TELEPHONE ENCOUNTER
Called pt.left message to call back to reschedule proc. Due to not getting Covid test. Covid test.

## 2020-12-01 RX ORDER — QUETIAPINE FUMARATE 25 MG/1
TABLET, FILM COATED ORAL
Qty: 180 TABLET | Refills: 1 | Status: SHIPPED | OUTPATIENT
Start: 2020-12-01 | End: 2021-01-07 | Stop reason: SDUPTHER

## 2020-12-03 ENCOUNTER — TELEPHONE (OUTPATIENT)
Dept: UROLOGY | Facility: CLINIC | Age: 65
End: 2020-12-03

## 2020-12-03 DIAGNOSIS — N39.0 RECURRENT UTI: Primary | ICD-10-CM

## 2020-12-03 NOTE — TELEPHONE ENCOUNTER
----- Message from Nahomy England sent at 12/3/2020 12:31 PM CST -----  Contact: Pt  Pt's requesting to speak with Christi. Please follow up with Pt for further assistance.      Confirmed patient's contact info below:     Patient Name: Tasha Hawley     Phone Number: [phone]

## 2020-12-08 ENCOUNTER — OFFICE VISIT (OUTPATIENT)
Dept: INTERNAL MEDICINE | Facility: CLINIC | Age: 65
End: 2020-12-08
Payer: MEDICARE

## 2020-12-08 VITALS
HEART RATE: 58 BPM | OXYGEN SATURATION: 96 % | HEIGHT: 65 IN | BODY MASS INDEX: 30.93 KG/M2 | SYSTOLIC BLOOD PRESSURE: 100 MMHG | DIASTOLIC BLOOD PRESSURE: 60 MMHG | WEIGHT: 185.63 LBS

## 2020-12-08 DIAGNOSIS — I89.0 LYMPHEDEMA OF BOTH LOWER EXTREMITIES: Chronic | ICD-10-CM

## 2020-12-08 DIAGNOSIS — N39.0 RECURRENT UTI (URINARY TRACT INFECTION): ICD-10-CM

## 2020-12-08 DIAGNOSIS — Z93.59 SUPRAPUBIC CATHETER: Chronic | ICD-10-CM

## 2020-12-08 DIAGNOSIS — F11.20 NARCOTIC DEPENDENCY, CONTINUOUS: Chronic | ICD-10-CM

## 2020-12-08 DIAGNOSIS — G89.4 CHRONIC PAIN SYNDROME: Primary | Chronic | ICD-10-CM

## 2020-12-08 DIAGNOSIS — Z74.09 IMPAIRED FUNCTIONAL MOBILITY AND ACTIVITY TOLERANCE: ICD-10-CM

## 2020-12-08 PROBLEM — M79.89 SWELLING OF BOTH LOWER EXTREMITIES: Status: RESOLVED | Noted: 2020-06-22 | Resolved: 2020-12-08

## 2020-12-08 PROBLEM — R63.4 WEIGHT LOSS, UNINTENTIONAL: Status: RESOLVED | Noted: 2017-02-01 | Resolved: 2020-12-08

## 2020-12-08 PROCEDURE — 99999 PR PBB SHADOW E&M-EST. PATIENT-LVL V: ICD-10-PCS | Mod: PBBFAC,HCNC,, | Performed by: INTERNAL MEDICINE

## 2020-12-08 PROCEDURE — 99214 OFFICE O/P EST MOD 30 MIN: CPT | Mod: HCNC,S$GLB,, | Performed by: INTERNAL MEDICINE

## 2020-12-08 PROCEDURE — 99214 PR OFFICE/OUTPT VISIT, EST, LEVL IV, 30-39 MIN: ICD-10-PCS | Mod: HCNC,S$GLB,, | Performed by: INTERNAL MEDICINE

## 2020-12-08 PROCEDURE — 3008F BODY MASS INDEX DOCD: CPT | Mod: HCNC,CPTII,S$GLB, | Performed by: INTERNAL MEDICINE

## 2020-12-08 PROCEDURE — 3008F PR BODY MASS INDEX (BMI) DOCUMENTED: ICD-10-PCS | Mod: HCNC,CPTII,S$GLB, | Performed by: INTERNAL MEDICINE

## 2020-12-08 PROCEDURE — 1125F AMNT PAIN NOTED PAIN PRSNT: CPT | Mod: HCNC,S$GLB,, | Performed by: INTERNAL MEDICINE

## 2020-12-08 PROCEDURE — 99999 PR PBB SHADOW E&M-EST. PATIENT-LVL V: CPT | Mod: PBBFAC,HCNC,, | Performed by: INTERNAL MEDICINE

## 2020-12-08 PROCEDURE — 1125F PR PAIN SEVERITY QUANTIFIED, PAIN PRESENT: ICD-10-PCS | Mod: HCNC,S$GLB,, | Performed by: INTERNAL MEDICINE

## 2020-12-08 NOTE — PROGRESS NOTES
Subjective:       Patient ID: Tasha Hawley is a 65 y.o. female.    Chief Complaint:   Follow-up    HPI - Feels okay today.  Seeing Dr. Mayo for botox injection; feels like she has a UTI right now and will talk to Dr. Mayo about that.  She continues to have diarrhea and constipation intermittently.  She is due for mammogram, but declines.  She's got more edema today than usual.   says that's b/c she was out shopping for [employer].  When she keeps legs elevated at home, it's better.  She's in w/c today; didn't attempt to get her on the table.  She is not a smoker    PMH:  Chronic insomnia, better now  Chronic low back pain with narcotic use.  Indwelling narcotic pump  History CVA with dysarthria  Neurogenic bladder, with recurrent UTI's. Followed by urogyn ([facility]).  Suprapubic catheter  Hypothyroidism, stable  CECI, not on CPAP  CAD, with ACS in Jan 2016 - subtot mid LAD lesion without PCI; ischemic CM with EF 40% and chronic LE edema. Followed by [facility]  Anxiety and Depression  Chronic constipation, likely d/t ongoing narcotic use.  Also intermittent diarrhea  Intermittent nausea  LE dependent edema     Meds: Reviewed and reconciled in EPIC with patient during visit today.    Review of Systems   Constitutional: Negative for fever.   HENT: Negative for congestion.    Respiratory: Negative for shortness of breath.    Cardiovascular: Positive for chest pain and leg swelling.   Gastrointestinal: Positive for diarrhea.   Genitourinary: Negative for difficulty urinating.   Musculoskeletal: Positive for arthralgias and back pain.   Skin: Negative for rash.   Neurological: Negative for headaches.   Psychiatric/Behavioral: Negative for sleep disturbance.       Objective:      Physical Exam  Vitals signs reviewed.   Constitutional:       Appearance: She is well-developed.      Comments: Examined in wheelchair   HENT:      Head: Normocephalic and atraumatic.   Cardiovascular:      Rate and Rhythm:  Normal rate and regular rhythm.      Heart sounds: Normal heart sounds. No murmur. No friction rub. No gallop.    Pulmonary:      Effort: Pulmonary effort is normal. No respiratory distress.      Breath sounds: Normal breath sounds. No wheezing or rales.   Chest:      Chest wall: No tenderness.   Skin:     General: Skin is warm and dry.      Findings: No erythema.   Neurological:      Mental Status: She is alert and oriented to person, place, and time.         Assessment:       1. Chronic pain syndrome    2. Narcotic dependency, continuous    3. Lymphedema of both lower extremities    4. Suprapubic catheter    5. Recurrent UTI (urinary tract infection)    6. Impaired functional mobility and activity tolerance        Plan:       Tasha was seen today for follow-up.    Diagnoses and all orders for this visit:    Chronic pain syndrome - stable, gets narcotics from outside pain clinic    Narcotic dependency, continuous    Lymphedema of both lower extremities - discussed elevation, wrapping    Suprapubic catheter - stable, managed by urogyn    Recurrent UTI (urinary tract infection) - as above    Impaired functional mobility and activity tolerance - would benefit from more ambulation.  She is planning a trip to see family for [place]; gathering of about 30 people.  Strongly recommended against that.    rtc prn    PAPO Hodges MD MPH  Staff Internist

## 2020-12-09 ENCOUNTER — TELEPHONE (OUTPATIENT)
Dept: UROLOGY | Facility: CLINIC | Age: 65
End: 2020-12-09

## 2020-12-09 DIAGNOSIS — R33.9 INCOMPLETE EMPTYING OF BLADDER: ICD-10-CM

## 2020-12-09 DIAGNOSIS — Z01.818 PREOP EXAMINATION: ICD-10-CM

## 2020-12-09 DIAGNOSIS — N39.46 MIXED STRESS AND URGE URINARY INCONTINENCE: ICD-10-CM

## 2020-12-09 DIAGNOSIS — N31.9 NEUROGENIC BLADDER: Primary | ICD-10-CM

## 2020-12-09 NOTE — TELEPHONE ENCOUNTER
Called [facility]  at [phone]. Gave verbal order for sptube to be changed and urine specimen collected for culture and sensitivity today (12/9/2020) for treatment before procedure on 12/15/2020.

## 2020-12-10 ENCOUNTER — TELEPHONE (OUTPATIENT)
Dept: UROLOGY | Facility: CLINIC | Age: 65
End: 2020-12-10

## 2020-12-10 ENCOUNTER — DOCUMENT SCAN (OUTPATIENT)
Dept: HOME HEALTH SERVICES | Facility: HOSPITAL | Age: 65
End: 2020-12-10
Payer: MEDICAID

## 2020-12-10 NOTE — TELEPHONE ENCOUNTER
----- Message from Christi Wang LPN sent at 12/10/2020  1:05 PM CST -----  Contact: pt  States she needs to know around what time she needs to come for procedure. She has to let her transportation know.   ----- Message -----  From: Lluvia Frank  Sent: 12/10/2020  12:44 PM CST  To: Maria Esther Iyer Staff    Please call pt at [phone]     Patient has questions regarding her upcoming procedure    Thank you

## 2020-12-12 ENCOUNTER — LAB VISIT (OUTPATIENT)
Dept: INTERNAL MEDICINE | Facility: CLINIC | Age: 65
End: 2020-12-12
Payer: MEDICARE

## 2020-12-12 DIAGNOSIS — R33.9 INCOMPLETE EMPTYING OF BLADDER: ICD-10-CM

## 2020-12-12 DIAGNOSIS — N39.46 MIXED STRESS AND URGE URINARY INCONTINENCE: ICD-10-CM

## 2020-12-12 DIAGNOSIS — Z01.818 PREOP EXAMINATION: ICD-10-CM

## 2020-12-12 DIAGNOSIS — N31.9 NEUROGENIC BLADDER: ICD-10-CM

## 2020-12-12 PROCEDURE — U0003 INFECTIOUS AGENT DETECTION BY NUCLEIC ACID (DNA OR RNA); SEVERE ACUTE RESPIRATORY SYNDROME CORONAVIRUS 2 (SARS-COV-2) (CORONAVIRUS DISEASE [COVID-19]), AMPLIFIED PROBE TECHNIQUE, MAKING USE OF HIGH THROUGHPUT TECHNOLOGIES AS DESCRIBED BY CMS-2020-01-R: HCPCS | Mod: HCNC

## 2020-12-13 LAB — SARS-COV-2 RNA RESP QL NAA+PROBE: NOT DETECTED

## 2020-12-14 ENCOUNTER — TELEPHONE (OUTPATIENT)
Dept: UROLOGY | Facility: CLINIC | Age: 65
End: 2020-12-14

## 2020-12-14 ENCOUNTER — ANESTHESIA EVENT (OUTPATIENT)
Dept: SURGERY | Facility: HOSPITAL | Age: 65
End: 2020-12-14
Payer: MEDICARE

## 2020-12-14 RX ORDER — CEFAZOLIN SODIUM 1 G/3ML
2 INJECTION, POWDER, FOR SOLUTION INTRAMUSCULAR; INTRAVENOUS
Status: CANCELLED | OUTPATIENT
Start: 2020-12-14

## 2020-12-14 NOTE — PRE-PROCEDURE INSTRUCTIONS
PREOP INSTRUCTIONS:No solid food ,milk or milk products for 8 hours prior to procedure.Clear liquids are allowed up to 2 hours before procedure.Clear liquids are:water,apple juice,gatorade & powerade.Instructed to follow the surgeon's instructions if they differ from these.Shower instructions as well as directions to the [facility] were given.Encouraged to wear loose fitting,comfortable clothing.Medication instructions for pm prior to and am of procedure reviewed.Instructed to avoid taking vitamins,supplements,aspirin and ibuprofen the morning of surgery.Patient stated that she plans to take no medications the morning of surgery .Patient informed of the current visitor policy and advised patient that one visitor may accompany each patient into the hospital and wait (socially distanced) until a member of the medical team provides an update at the conclusion of the procedure.When they enter the hospital both patient and visitor will have their temperature checked.All visitors are asked to arrive with a mask and to keep their mask on throughout the visit.     Patient denies any side effects or issues with anesthesia or sedation.

## 2020-12-14 NOTE — TELEPHONE ENCOUNTER
Called pt to confirm arrival time of 10:00 for procedure on 12/15/2020. Gave pt NPO instructions and gave pt opportunity to ask questions. Pt verbalized understanding.    Pt was informed that only 1 person would be allowed to accompany them the morning of surgery.  Pt verbalized understanding.

## 2020-12-15 ENCOUNTER — ANESTHESIA (OUTPATIENT)
Dept: SURGERY | Facility: HOSPITAL | Age: 65
End: 2020-12-15
Payer: MEDICARE

## 2020-12-15 ENCOUNTER — HOSPITAL ENCOUNTER (OUTPATIENT)
Facility: HOSPITAL | Age: 65
Discharge: HOME | End: 2020-12-15
Attending: UROLOGY | Admitting: UROLOGY
Payer: MEDICARE

## 2020-12-15 VITALS
SYSTOLIC BLOOD PRESSURE: 145 MMHG | RESPIRATION RATE: 20 BRPM | OXYGEN SATURATION: 96 % | DIASTOLIC BLOOD PRESSURE: 72 MMHG | HEIGHT: 65 IN | WEIGHT: 178 LBS | BODY MASS INDEX: 29.66 KG/M2 | HEART RATE: 46 BPM | TEMPERATURE: 98 F

## 2020-12-15 DIAGNOSIS — N31.9 NEUROGENIC BLADDER: Primary | ICD-10-CM

## 2020-12-15 PROCEDURE — 37000009 HC ANESTHESIA EA ADD 15 MINS: Mod: HCNC | Performed by: UROLOGY

## 2020-12-15 PROCEDURE — 36000706: Mod: HCNC | Performed by: UROLOGY

## 2020-12-15 PROCEDURE — 71000044 HC DOSC ROUTINE RECOVERY FIRST HOUR: Mod: HCNC | Performed by: UROLOGY

## 2020-12-15 PROCEDURE — 52287 CYSTOSCOPY CHEMODENERVATION: CPT | Mod: HCNC,,, | Performed by: UROLOGY

## 2020-12-15 PROCEDURE — 36000707: Mod: HCNC | Performed by: UROLOGY

## 2020-12-15 PROCEDURE — D9220A PRA ANESTHESIA: ICD-10-PCS | Mod: HCNC,ANES,, | Performed by: ANESTHESIOLOGY

## 2020-12-15 PROCEDURE — 37000008 HC ANESTHESIA 1ST 15 MINUTES: Mod: HCNC | Performed by: UROLOGY

## 2020-12-15 PROCEDURE — 63600175 PHARM REV CODE 636 W HCPCS: Mod: JG,HCNC | Performed by: UROLOGY

## 2020-12-15 PROCEDURE — 63600175 PHARM REV CODE 636 W HCPCS: Mod: HCNC | Performed by: STUDENT IN AN ORGANIZED HEALTH CARE EDUCATION/TRAINING PROGRAM

## 2020-12-15 PROCEDURE — D9220A PRA ANESTHESIA: ICD-10-PCS | Mod: HCNC,CRNA,, | Performed by: NURSE ANESTHETIST, CERTIFIED REGISTERED

## 2020-12-15 PROCEDURE — D9220A PRA ANESTHESIA: Mod: HCNC,ANES,, | Performed by: ANESTHESIOLOGY

## 2020-12-15 PROCEDURE — 63600175 PHARM REV CODE 636 W HCPCS: Mod: HCNC | Performed by: NURSE ANESTHETIST, CERTIFIED REGISTERED

## 2020-12-15 PROCEDURE — 52287 PR CYSTOURETHROSCOPY WITH INJ FOR CHEMODENERVATION: ICD-10-PCS | Mod: HCNC,,, | Performed by: UROLOGY

## 2020-12-15 PROCEDURE — 51705 PR CHANGE OF BLADDER TUBE,SIMPLE: ICD-10-PCS | Mod: 51,HCNC,, | Performed by: UROLOGY

## 2020-12-15 PROCEDURE — 25000003 PHARM REV CODE 250: Mod: HCNC | Performed by: NURSE ANESTHETIST, CERTIFIED REGISTERED

## 2020-12-15 PROCEDURE — 51705 CHANGE OF BLADDER TUBE: CPT | Mod: 51,HCNC,, | Performed by: UROLOGY

## 2020-12-15 PROCEDURE — 71000015 HC POSTOP RECOV 1ST HR: Mod: HCNC | Performed by: UROLOGY

## 2020-12-15 PROCEDURE — D9220A PRA ANESTHESIA: Mod: HCNC,CRNA,, | Performed by: NURSE ANESTHETIST, CERTIFIED REGISTERED

## 2020-12-15 RX ORDER — HYDROMORPHONE HYDROCHLORIDE 2 MG/ML
INJECTION, SOLUTION INTRAMUSCULAR; INTRAVENOUS; SUBCUTANEOUS
Status: DISCONTINUED | OUTPATIENT
Start: 2020-12-15 | End: 2020-12-15

## 2020-12-15 RX ORDER — PROPOFOL 10 MG/ML
VIAL (ML) INTRAVENOUS CONTINUOUS PRN
Status: DISCONTINUED | OUTPATIENT
Start: 2020-12-15 | End: 2020-12-15

## 2020-12-15 RX ORDER — FLUCONAZOLE 2 MG/ML
INJECTION, SOLUTION INTRAVENOUS
Status: DISCONTINUED
Start: 2020-12-15 | End: 2020-12-15 | Stop reason: HOSPADM

## 2020-12-15 RX ORDER — HYDROMORPHONE HYDROCHLORIDE 1 MG/ML
INJECTION, SOLUTION INTRAMUSCULAR; INTRAVENOUS; SUBCUTANEOUS
Status: DISCONTINUED
Start: 2020-12-15 | End: 2020-12-15 | Stop reason: HOSPADM

## 2020-12-15 RX ORDER — CEPHALEXIN 500 MG/1
500 CAPSULE ORAL EVERY 12 HOURS
Qty: 6 CAPSULE | Refills: 0 | Status: SHIPPED | OUTPATIENT
Start: 2020-12-15 | End: 2020-12-18

## 2020-12-15 RX ORDER — GENTAMICIN SULFATE 100 MG/100ML
240 INJECTION, SOLUTION INTRAVENOUS ONCE
Status: COMPLETED | OUTPATIENT
Start: 2020-12-15 | End: 2020-12-15

## 2020-12-15 RX ORDER — MIDAZOLAM HYDROCHLORIDE 1 MG/ML
INJECTION, SOLUTION INTRAMUSCULAR; INTRAVENOUS
Status: DISCONTINUED | OUTPATIENT
Start: 2020-12-15 | End: 2020-12-15

## 2020-12-15 RX ORDER — FLUCONAZOLE 2 MG/ML
400 INJECTION, SOLUTION INTRAVENOUS ONCE
Status: COMPLETED | OUTPATIENT
Start: 2020-12-15 | End: 2020-12-15

## 2020-12-15 RX ORDER — ONDANSETRON 2 MG/ML
INJECTION INTRAMUSCULAR; INTRAVENOUS
Status: DISCONTINUED | OUTPATIENT
Start: 2020-12-15 | End: 2020-12-15

## 2020-12-15 RX ORDER — PROPOFOL 10 MG/ML
VIAL (ML) INTRAVENOUS
Status: DISCONTINUED | OUTPATIENT
Start: 2020-12-15 | End: 2020-12-15

## 2020-12-15 RX ADMIN — PROPOFOL 150 MCG/KG/MIN: 10 INJECTION, EMULSION INTRAVENOUS at 01:12

## 2020-12-15 RX ADMIN — FLUCONAZOLE 400 MG: 2 INJECTION, SOLUTION INTRAVENOUS at 01:12

## 2020-12-15 RX ADMIN — SODIUM CHLORIDE, SODIUM GLUCONATE, SODIUM ACETATE, POTASSIUM CHLORIDE, MAGNESIUM CHLORIDE, SODIUM PHOSPHATE, DIBASIC, AND POTASSIUM PHOSPHATE: .53; .5; .37; .037; .03; .012; .00082 INJECTION, SOLUTION INTRAVENOUS at 01:12

## 2020-12-15 RX ADMIN — HYDROMORPHONE HYDROCHLORIDE 0.1 MG: 2 INJECTION, SOLUTION INTRAMUSCULAR; INTRAVENOUS; SUBCUTANEOUS at 01:12

## 2020-12-15 RX ADMIN — AMPICILLIN SODIUM 1 G: 1 INJECTION, POWDER, FOR SOLUTION INTRAMUSCULAR; INTRAVENOUS at 01:12

## 2020-12-15 RX ADMIN — ONDANSETRON 4 MG: 2 INJECTION, SOLUTION INTRAMUSCULAR; INTRAVENOUS at 01:12

## 2020-12-15 RX ADMIN — MIDAZOLAM HYDROCHLORIDE 2 MG: 1 INJECTION, SOLUTION INTRAMUSCULAR; INTRAVENOUS at 01:12

## 2020-12-15 RX ADMIN — GENTAMICIN SULFATE 240 MG: 40 INJECTION, SOLUTION INTRAMUSCULAR; INTRAVENOUS at 01:12

## 2020-12-15 RX ADMIN — PROPOFOL 30 MG: 10 INJECTION, EMULSION INTRAVENOUS at 01:12

## 2020-12-15 NOTE — DISCHARGE INSTRUCTIONS
Post Cystoscopy Instructions  Do not strain to have a bowel movement  No strenuous exercise x 7 days  No driving while you are on narcotic pain medications or if your meadows  catheter is in place    You can expect:  To pass stone fragments if you had a stone procedure  Have pain when you void from your stent if you have a stent in place  See blood in your urine if you have a stent in place    If you have a catheter, please return to the ER if your catheter stops draining or you are having abdominal pain.    Call the doctor if:   Temperature is greater than 101F   Persistent vomiting and inability to keep food down   Inability to void if you do not have a catheter         Catheter Care    When to seek medical advice  Call your healthcare provider right away if any of these occur:  · Fever of 100.4ºF (38ºC) or higher, or as directed by your healthcare provider  · Bladder pain or fullness  · Abdominal swelling, nausea or vomiting, or back pain  · Blood or urine leakage around the catheter  · Bloody urine coming from the catheter (if a new symptom)  · Catheter falls out  · Catheter stops draining for 6 hours  · Weakness, dizziness, or fainting  Date Last Reviewed: 10/1/2016  © 7602-0815 [employer]. [address] [place] [address]. All rights reserved. This information is not intended as a substitute for professional medical care. Always follow your healthcare professional's instructions.      Anesthesia: General Anesthesia     You are watched continuously during your procedure by your anesthesia provider.     Youre due to have surgery. During surgery, youll be given medicine called anesthesia or anesthetic. This will keep you comfortable and pain-free. Your anesthesia provider will use general anesthesia.  What is general anesthesia?  General anesthesia puts you into a state like deep sleep. It goes into the bloodstream (IV anesthetics), into the lungs (gas anesthetics), or both. You feel  nothing during the procedure. You will not remember it. During the procedure, the anesthesia provider monitors you continuously. He or she checks your heart rate and rhythm, blood pressure, breathing, and blood oxygen.  · IV anesthetics. IV anesthetics are given through an IV line in your arm. Theyre often given first. This is so you are asleep before a gas anesthetic is started. Some kinds of IV anesthetics relieve pain. Others relax you. Your doctor will decide which kind is best in your case.  · Gas anesthetics. Gas anesthetics are breathed into the lungs. They are often used to keep you asleep. They can be given through a facemask or a tube placed in your larynx or trachea (breathing tube).  ¨ If you have a facemask, your anesthesia provider will most likely place it over your nose and mouth while youre still awake. Youll breathe oxygen through the mask as your IV anesthetic is started. Gas anesthetic may be added through the mask.  ¨ If you have a tube in the larynx or trachea, it will be inserted into your throat after youre asleep.  Anesthesia tools and medicines  You will likely have:  · IV anesthetics. These are put into an IV line into your bloodstream.  · Gas anesthetics. You breathe these anesthetics into your lungs, where they pass into your bloodstream.  · Pulse oximeter. This is a small clip that is attached to the end of your finger. This measures your blood oxygen level.  · Electrocardiography leads (electrodes). These are small sticky pads that are placed on your chest. They record your heart rate and rhythm.  · Blood pressure cuff. This reads your blood pressure.  Risks and possible complications  General anesthesia has some risks. These include:  · Breathing problems  · Nausea and vomiting  · Sore throat or hoarseness (usually temporary)  · Allergic reaction to the anesthetic  · Irregular heartbeat (rare)  · Cardiac arrest (rare)   Anesthesia safety  · Follow all instructions you are given  for how long not to eat or drink before your procedure.  · Be sure your doctor knows what medicines and drugs you take. This includes over-the-counter medicines, herbs, supplements, alcohol or other drugs. You will be asked when those were last taken.  · Have an adult family member or friend drive you home after the procedure.  · For the first 24 hours after your surgery:  ¨ Do not drive or use heavy equipment.  ¨ Do not make important decisions or sign legal documents. If important decisions or signing legal documents is necessary during the first 24 hours after surgery, have a trusted family member or spouse act on your behalf.  ¨ Avoid alcohol.  ¨ Have a responsible adult stay with you. He or she can watch for problems and help keep you safe.  Date Last Reviewed: 12/1/2016 © 2000-2017 [employer]. [address] [place] [address], [place] [address]. All rights reserved. This information is not intended as a substitute for professional medical care. Always follow your healthcare professional's instructions.

## 2020-12-15 NOTE — H&P
Urology ([facility]) H&P  Staff: Shireen Mayo MD    HPI:  Tasha Hawley is a 65 y.o. female with neurogenic bladder.      She previously underwent SPT placement. Last cysto/botox was in 6/2020 in which she received 200 U. She also has a history of JUNAID s/p autologous sling. She has been leaking around the suprapubic tube and experiencing bladder spasms.  She responds to Botox injections of the bladder.      ROS:  Neg except per HPI    Past Medical History:   Diagnosis Date    Anticoagulant long-term use     Anxiety     Arthritis     Bilateral lower extremity edema     severe chronic    Carotid artery occlusion     Cataract     Coronary artery disease     subtotalled LAD with collateral    Depression     Fever blister     Hypothyroid     Iron deficiency anemia     Lumbar radiculopathy     with chronic pain    Ocular migraine     Sleep apnea     cpap       Past Surgical History:   Procedure Laterality Date    ADENOIDECTOMY      BACK SURGERY      x 12    CARDIAC CATHETERIZATION  2016    subtotalled LAD with right to left collaterals    CATARACT EXTRACTION W/  INTRAOCULAR LENS IMPLANT Left     Dr Coleman     CYSTOSCOPIC LITHOLAPAXY N/A 6/27/2019    Procedure: CYSTOLITHOLAPAXY;  Surgeon: Shireen Mayo MD;  Location: [facility] OR [address] [place] [address];  Service: Urology;  Laterality: N/A;    CYSTOSCOPIC LITHOLAPAXY N/A 9/3/2019    Procedure: CYSTOLITHOLAPAXY;  Surgeon: Shireen Mayo MD;  Location: [facility] OR [address] [place] [address];  Service: Urology;  Laterality: N/A;    ESOPHAGOGASTRODUODENOSCOPY N/A 5/23/2018    Procedure: ESOPHAGOGASTRODUODENOSCOPY (EGD);  Surgeon: Prince Vance MD;  Location: [facility] ([address] [place] [address]);  Service: Endoscopy;  Laterality: N/A;  r/s 'd per Dr. Vance due to family emergency- ER    HYSTERECTOMY  1975    endometriosis    pain pump placement      SQ Dilaudid Pump managed by Dr. Hillman, Pain Management    REPLACEMENT OF CATHETER N/A 10/31/2019    Procedure: REPLACEMENT,  CATHETER-SUPRAPUBIC;  Surgeon: Shireen Mayo MD;  Location: [facility] OR [address] [place] [address];  Service: Urology;  Laterality: N/A;    SPINAL CORD STIMULATOR REMOVAL      before [place]    SPINE SURGERY  5-13-13    CERVICAL FUSION    TONSILLECTOMY         Social History     Socioeconomic History    Marital status:    Tobacco Use    Smoking status: Never Smoker    Smokeless tobacco: Never Used   Substance and Sexual Activity    Alcohol use: Never     Frequency: Never    Drug use: No    Sexual activity: Never     Partners: Male       Family History   Problem Relation Age of Onset    Cancer Mother 55        breast    Cancer Father         esophagus,had laryngectomy    Esophageal cancer Father     Parkinsonism Maternal Grandmother     Tremor Maternal Grandmother     No Known Problems Brother     No Known Problems Brother     Heart disease Maternal Uncle     Colon cancer Maternal Uncle         Less than 60    No Known Problems Sister     No Known Problems Maternal Aunt     Cirrhosis Paternal Aunt         ETOH    Liver disease Paternal Aunt         ETOH    Liver disease Paternal Uncle         ETOH    Cirrhosis Paternal Uncle         ETOH     Review of patient's allergies indicates:   Allergen Reactions    (d)-limonene flavor      Other reaction(s): difficult intubation  Other reaction(s): Difficulty breathing    Bactrim [sulfamethoxazole-trimethoprim] Anaphylaxis    Benadryl [diphenhydramine hcl] Shortness Of Breath    Fentanyl Itching, Nausea And Vomiting and Swelling             Imitrex [sumatriptan succinate] Shortness Of Breath    Topamax [topiramate] Shortness Of Breath    Vancomycin Shortness Of Breath     Rash    Butorphanol tartrate     Darvocet a500 [propoxyphene n-acetaminophen]      Other reaction(s): Difficulty breathing    White petrolatum-zinc     Zinc oxide-white petrolatum      Other reaction(s): Difficulty breathing    Latex, natural rubber Itching and Rash    Phenytoin Rash  and Other (See Comments)     Trouble breathing     Current Outpatient Medications on File Prior to Encounter   Medication Sig Dispense Refill    aspirin (ECOTRIN) 81 MG EC tablet Take 1 tablet (81 mg total) by mouth once daily.  0    atorvastatin (LIPITOR) 80 MG tablet TAKE 1 TABLET BY MOUTH DAILY 90 tablet 3    FLUoxetine 20 MG capsule TAKE THREE CAPSULES BY MOUTH EVERY  capsule 1    gabapentin (NEURONTIN) 100 MG capsule Take 1 capsule (100 mg total) by mouth 3 (three) times daily. 90 capsule 2    levothyroxine (SYNTHROID) 125 MCG tablet Take 1 tablet (125 mcg total) by mouth before breakfast. 90 tablet 3    lidocaine (LIDODERM) 5 %       oxybutynin (DITROPAN XL) 15 MG TR24 Take 1 tablet (15 mg total) by mouth once daily. 90 tablet 3    pantoprazole (PROTONIX) 40 MG tablet Take 1 tablet (40 mg total) by mouth once daily. 90 tablet 3    potassium chloride (MICRO-K) 10 MEQ CpSR       ascorbic acid, vitamin C, (VITAMIN C) 500 MG tablet Take 500 mg by mouth once daily.      butalbital-acetaminophen-caffeine -40 mg (FIORICET, ESGIC) -40 mg per tablet Take 1 tablet by mouth every 4 (four) hours as needed for Headaches.       cholecalciferol, vitamin D3, (VITAMIN D3) 50 mcg (2,000 unit) Cap Take 1 capsule (2,000 Units total) by mouth once daily. 90 capsule 3    ergocalciferol (ERGOCALCIFEROL) 50,000 unit Cap Take 1 capsule (50,000 Units total) by mouth every 7 days. for 12 doses 12 capsule 0    HYDROcodone-acetaminophen (NORCO)  mg per tablet Take 1 tablet by mouth every 6 (six) hours as needed for Pain. 15 tablet 0    intrathecal pain pump compound Hydromorphone (7.5 mg/mL) infusion at 3.6799 mg/day (0.1533 mg/hr) out of a total reservoir volume of 37.3 mL  Pump filled every 2 months      methylPREDNISolone (MEDROL DOSEPACK) 4 mg tablet       nitroGLYCERIN (NITROSTAT) 0.4 MG SL tablet Place 1 tablet (0.4 mg total) under the tongue every 5 (five) minutes as needed for Chest pain.  25 tablet 3    promethazine (PHENERGAN) 25 MG tablet Take 25 mg by mouth every 6 (six) hours as needed for Nausea.      QUEtiapine (SEROQUEL) 100 MG Tab TAKE 1 TABLET (100 MG TOTAL) BY MOUTH EVERY EVENING. 90 tablet 1    QUEtiapine (SEROQUEL) 25 MG Tab Take 12.5-25 mg twice daily as needed for anxiety 180 tablet 1    senna (SENNA LAX) 8.6 mg tablet Take 2 tablets by mouth 2 (two) times daily.      torsemide (DEMADEX) 100 MG Tab Take 1 tablet (100 mg total) by mouth once daily. 30 tablet 11    traZODone (DESYREL) 100 MG tablet Take 3 tablets (300 mg total) by mouth every evening. 270 tablet 1    [DISCONTINUED] lamotrigine (LAMICTAL) 25 MG tablet Take 1 tablet (25 mg total) by mouth once daily. 30 tablet 6       Anticoagulation:  Yes - ASA 81    Physical Exam:  General: No acute distress, well developed. AAOx3  Head: Normocephalic, Atraumatic  Eyes: Extra-occular movements intact, No discharge  Neck: supple, symmetrical, trachea midline  Lungs: normal respiratory effort, no respiratory distress, no wheezes  CV: regular rate, 2+ pulses  Abdomen: soft, non-tender, non-distended, no organomegaly  MSK: no edema, no deformities, normal ROM  Skin: skin color, texture, turgor normal.  Neurologic: no focal deficits, sensation intact    Labs:    Urine dipstick today - Positive for leuks and blood. Negative for nitrites and all other tested components.    Lab Results   Component Value Date    WBC 4.69 11/05/2020    HGB 11.4 (L) 11/05/2020    HCT 36.2 (L) 11/05/2020    MCV 88 11/05/2020     11/05/2020           BMP  Lab Results   Component Value Date     10/22/2020    K 4.2 10/22/2020     10/22/2020    CO2 33 (H) 10/22/2020    BUN 8 10/22/2020    CREATININE 0.88 10/22/2020    CALCIUM 9.0 10/22/2020    ANIONGAP 7 (L) 10/22/2020    ESTGFRAFRICA >60.0 10/22/2020    EGFRNONAA >60.0 10/22/2020       Assessment: Tasha R Chandan is a 65 y.o. female with neurogenic bladder.    Plan:     1. To OR today for  cystoscopy with botox.  2. Consents signed   3. I have explained the risk, benefits, and alternatives of the procedure in detail. The patient voices understanding and all questions have been answered. The patient agrees to proceed as planned.     Alberto Magdaleno MD    Agree with the above note.  No changes in clinical condition.  Proceed with planned procedure.

## 2020-12-15 NOTE — ANESTHESIA PREPROCEDURE EVALUATION
12/15/2020  Tasha Hawley is a 65 y.o., female.    Anesthesia Evaluation    I have reviewed the Patient Summary Reports.    I have reviewed the Nursing Notes. I have reviewed the NPO Status.   I have reviewed the Medications.     Review of Systems  Anesthesia Hx:  No problems with previous Anesthesia  History of prior surgery of interest to airway management or planning: Denies Family Hx of Anesthesia complications.   Denies Personal Hx of Anesthesia complications.   Cardiovascular:   Denies MI. CAD    Angina ECG has been reviewed. Carotid artery occulsion, ischemic cardiomyopathy   Pulmonary:   Denies COPD. Sleep Apnea    Renal/:  Renal/ Normal     Hepatic/GI:   GERD    Musculoskeletal:   Arthritis   Spine Disorders: lumbar and cervical Chronic Pain    Neurological:   Headaches Denies Seizures. + SCS   Endocrine:   Denies Diabetes. Hypothyroidism    Psych:   anxiety depression          Physical Exam  General:  Well nourished    Airway/Jaw/Neck:  Airway Findings: Mouth Opening: Normal Tongue: Normal  Jaw/Neck Findings:  Micrognathia: Negative Neck ROM: Normal ROM      Dental:  Dental Findings: In tact   Chest/Lungs:  Chest/Lungs Findings: Clear to auscultation, Normal Respiratory Rate     Heart/Vascular:  Heart Findings: Rate: Normal  Rhythm: Regular Rhythm  Sounds: Normal  Heart murmur: negative    Abdomen:  Abdomen Findings:  Normal, Nontender, Soft       Mental Status:  Mental Status Findings:  Cooperative, Alert and Oriented         Anesthesia Plan  Type of Anesthesia, risks & benefits discussed:  Anesthesia Type:  MAC, general  Patient's Preference:   Intra-op Monitoring Plan:   Intra-op Monitoring Plan Comments:   Post Op Pain Control Plan: multimodal analgesia, IV/PO Opioids PRN and per primary service following discharge from PACU  Post Op Pain Control Plan Comments:   Induction:   IV  Beta  Blocker:  Patient is not currently on a Beta-Blocker (No further documentation required).       Informed Consent: Patient understands risks and agrees with Anesthesia plan.  Questions answered. Anesthesia consent signed with patient.  ASA Score: 3     Day of Surgery Review of History & Physical:    H&P update referred to the surgeon.         Ready For Surgery From Anesthesia Perspective.

## 2020-12-15 NOTE — TRANSFER OF CARE
"Anesthesia Transfer of Care Note    Patient: Tasha Hawley    Procedure(s) Performed: Procedure(s) (LRB):  CYSTOSCOPY,WITH BOTULINUM TOXIN INJECTION 200 UNITS (N/A)  EXCHANGE, SUPRAPUBIC CATHETER    Patient location: PACU    Anesthesia Type: general    Transport from OR: Transported from OR on 6-10 L/min O2 by face mask with adequate spontaneous ventilation    Post pain: adequate analgesia    Post assessment: no apparent anesthetic complications and tolerated procedure well    Post vital signs: stable    Level of consciousness: awake and alert    Nausea/Vomiting: no nausea/vomiting    Complications: none    Transfer of care protocol was followed      Last vitals:   Visit Vitals  BP (!) 117/57 (BP Location: Right arm, Patient Position: Lying)   Pulse (!) 53   Temp 36.4 °C (97.5 °F) (Skin)   Resp 20   Ht 5' 5" (1.651 m)   Wt 80.7 kg (178 lb)   LMP  (LMP Unknown)   SpO2 99%   Breastfeeding No   BMI 29.62 kg/m²     "

## 2020-12-15 NOTE — DISCHARGE SUMMARY
[facility]  Discharge Note  Short Stay    Admit Date: 12/15/2020    Discharge Date and Time: 12/15/2020 1:42 PM      Attending Physician: Shireen Mayo MD     Discharge Provider: Alberto Magdaleno MD    Diagnoses:  Active Hospital Problems    Diagnosis  POA    *Neurogenic bladder [N31.9]  Yes     Chronic    Impaired functional mobility and activity tolerance [Z74.09]  Yes    Pure hypercholesterolemia [E78.00]  Yes    Chronic diastolic heart failure [I50.32]  Yes    Recurrent UTI (urinary tract infection) [N39.0]  Yes    Degenerative disc disease, lumbar [M51.36]  Yes     Chronic    Lymphedema of both lower extremities [I89.0]  Yes     Chronic    Primary hypothyroidism [E03.9]  Yes     Chronic    Drug-induced constipation [K59.03]  Yes     Chronic    GERD (gastroesophageal reflux disease) [K21.9]  Yes     Chronic    Narcotic dependency, continuous [F11.20]  Yes     Chronic    Chronic pain syndrome [G89.4]  Yes     Chronic    Major depressive disorder, recurrent, mild [F33.0]  Yes    Generalized anxiety disorder [F41.1]  Yes      Resolved Hospital Problems   No resolved problems to display.       Discharged Condition: good    Hospital Course: Patient was admitted for cystoscopy with botox injections and suprapubic catheter exchange and tolerated the procedure well with no complications. The patient was discharged home in good condition on the same day.       Final Diagnoses: Same as principal problem.    Disposition: Home or Self Care    Follow up/Patient Instructions:    Medications:  Reconciled Home Medications:   Current Discharge Medication List      START taking these medications    Details   cephALEXin (KEFLEX) 500 MG capsule Take 1 capsule (500 mg total) by mouth every 12 (twelve) hours. for 3 days  Qty: 6 capsule, Refills: 0         CONTINUE these medications which have NOT CHANGED    Details   aspirin (ECOTRIN) 81 MG EC tablet Take 1 tablet (81 mg total) by mouth once  daily.  Refills: 0      atorvastatin (LIPITOR) 80 MG tablet TAKE 1 TABLET BY MOUTH DAILY  Qty: 90 tablet, Refills: 3    Associated Diagnoses: Mixed hyperlipidemia      cholecalciferol, vitamin D3, (VITAMIN D3) 50 mcg (2,000 unit) Cap Take 1 capsule (2,000 Units total) by mouth once daily.  Qty: 90 capsule, Refills: 3    Associated Diagnoses: Vitamin D insufficiency      ergocalciferol (ERGOCALCIFEROL) 50,000 unit Cap Take 1 capsule (50,000 Units total) by mouth every 7 days. for 12 doses  Qty: 12 capsule, Refills: 0    Associated Diagnoses: Vitamin D insufficiency      FLUoxetine 20 MG capsule TAKE THREE CAPSULES BY MOUTH EVERY DAY  Qty: 270 capsule, Refills: 1    Associated Diagnoses: Anxiety      gabapentin (NEURONTIN) 100 MG capsule Take 1 capsule (100 mg total) by mouth 3 (three) times daily.  Qty: 90 capsule, Refills: 2      HYDROcodone-acetaminophen (NORCO)  mg per tablet Take 1 tablet by mouth every 6 (six) hours as needed for Pain.  Qty: 15 tablet, Refills: 0      intrathecal pain pump compound Hydromorphone (7.5 mg/mL) infusion at 3.6799 mg/day (0.1533 mg/hr) out of a total reservoir volume of 37.3 mL  Pump filled every 2 months      levothyroxine (SYNTHROID) 125 MCG tablet Take 1 tablet (125 mcg total) by mouth before breakfast.  Qty: 90 tablet, Refills: 3    Associated Diagnoses: Primary hypothyroidism      lidocaine (LIDODERM) 5 %       oxybutynin (DITROPAN XL) 15 MG TR24 Take 1 tablet (15 mg total) by mouth once daily.  Qty: 90 tablet, Refills: 3    Associated Diagnoses: Neurogenic bladder      pantoprazole (PROTONIX) 40 MG tablet Take 1 tablet (40 mg total) by mouth once daily.  Qty: 90 tablet, Refills: 3    Associated Diagnoses: Esophageal dysphagia      potassium chloride (MICRO-K) 10 MEQ CpSR       promethazine (PHENERGAN) 25 MG tablet Take 25 mg by mouth every 6 (six) hours as needed for Nausea.      !! QUEtiapine (SEROQUEL) 100 MG Tab TAKE 1 TABLET (100 MG TOTAL) BY MOUTH EVERY  EVENING.  Qty: 90 tablet, Refills: 1      !! QUEtiapine (SEROQUEL) 25 MG Tab Take 12.5-25 mg twice daily as needed for anxiety  Qty: 180 tablet, Refills: 1      senna (SENNA LAX) 8.6 mg tablet Take 2 tablets by mouth 2 (two) times daily.      torsemide (DEMADEX) 100 MG Tab Take 1 tablet (100 mg total) by mouth once daily.  Qty: 30 tablet, Refills: 11    Comments: To replace Furosemide      traZODone (DESYREL) 100 MG tablet Take 3 tablets (300 mg total) by mouth every evening.  Qty: 270 tablet, Refills: 1    Comments: Covering physician will only provide for 30 days  Associated Diagnoses: Insomnia, unspecified type      butalbital-acetaminophen-caffeine -40 mg (FIORICET, ESGIC) -40 mg per tablet Take 1 tablet by mouth every 4 (four) hours as needed for Headaches.       nitroGLYCERIN (NITROSTAT) 0.4 MG SL tablet Place 1 tablet (0.4 mg total) under the tongue every 5 (five) minutes as needed for Chest pain.  Qty: 25 tablet, Refills: 3    Associated Diagnoses: Coronary artery disease involving native coronary artery of native heart with angina pectoris       !! - Potential duplicate medications found. Please discuss with provider.      STOP taking these medications       lamotrigine (LAMICTAL) 25 MG tablet Comments:   Reason for Stopping:             Discharge Procedure Orders   Diet Adult Regular     No driving until:   Order Comments: Off narcotics     Notify your health care provider if you experience any of the following:  temperature >100.4     Notify your health care provider if you experience any of the following:  persistent nausea and vomiting or diarrhea     Notify your health care provider if you experience any of the following:  severe uncontrolled pain     Notify your health care provider if you experience any of the following:  difficulty breathing or increased cough     Notify your health care provider if you experience any of the following:  severe persistent headache     Notify your health  care provider if you experience any of the following:  worsening rash     Notify your health care provider if you experience any of the following:  persistent dizziness, light-headedness, or visual disturbances     Notify your health care provider if you experience any of the following:  increased confusion or weakness     Activity as tolerated     Follow-up Information     Shireen Mayo MD.    Specialty: Urology  Why: As needed  Contact information:  [facility] [id number] [phone]                 As above.

## 2020-12-15 NOTE — OP NOTE
[facility]  Operative Note    Date: 12/15/2020    Pre-Op Diagnosis:  Neurogenic bladder     Patient Active Problem List    Diagnosis Date Noted    Impaired functional mobility and activity tolerance 06/22/2020    Hypotension 05/16/2020    Urinary tract infection associated with cystostomy catheter 05/12/2020    Pain in both lower legs 02/06/2020    Left sided abdominal pain 02/05/2020    Constipation due to opioid therapy 01/30/2020    Left lower quadrant abdominal pain 01/27/2020    Partial small bowel obstruction 01/27/2020    Bradycardia, sinus 01/27/2020    Pure hypercholesterolemia 01/13/2020    Chronic diastolic heart failure 01/13/2020    Inflammatory spondylopathy of lumbar region 08/29/2019    Chronic venous insufficiency 05/23/2019    Mixed stress and urge urinary incontinence 05/13/2019    Dyspnea 02/22/2019    Costochondritis 02/22/2019    Recurrent UTI (urinary tract infection) 06/08/2018    Esophageal dysphagia 05/23/2018    Ischemic cardiomyopathy 03/08/2018    Suprapubic catheter 02/01/2018    Insomnia due to medical condition 12/08/2017    Bilateral carotid artery disease 11/14/2017    Scoliosis deformity of spine 03/31/2017    S/P insertion of spinal cord stimulator 03/31/2017    S/P insertion of intrathecal pump 03/31/2017    Paresthesia of both lower extremities 03/31/2017    Degenerative disc disease, lumbar 03/31/2017    Osteoarthritis of spine with radiculopathy, lumbar region 03/31/2017    Facet arthropathy, lumbar 03/31/2017    Lymphedema of both lower extremities 03/02/2017    Primary hypothyroidism 02/02/2017    Frequent falls 02/01/2017    Urinary retention 12/21/2016    Neurogenic bladder 09/27/2016    Drug-induced constipation 08/16/2016    GERD (gastroesophageal reflux disease) 08/16/2016    Coronary artery disease of native artery of native heart with stable angina pectoris 08/16/2016    Narcotic dependency, continuous  07/18/2014    Thoracic aorta atherosclerosis 07/18/2014    Cervical myelopathy 05/13/2013    Chronic pain syndrome 05/01/2013    Major depressive disorder, recurrent, mild 02/21/2013    Generalized anxiety disorder     Iron deficiency anemia         Post-Op Diagnosis: same    Procedure(s) Performed:   1.  Cystoscopy with bladder botox injections  2.  Exchange of suprapubic catheter by MD    Specimen(s): none    Staff Surgeon:  Shireen Mayo MD    Assistant Surgeon: Alberto Magdaleno MD    Anesthesia: Monitored Local Anesthesia with Sedation    Indications: Tasha Hawley is a 65 y.o. female with neurogenic bladder with suprapubic catheter in place. She has a history of stress incontinence and is s/p autologous sling placement.    Findings:  1. 200 U botox in 20 cc injected into bladder detrusor; good wheals raised  2. Areas of bullous edema and erythema as seen before and previously biopsied; consistent with catheter irritation  3. Suprapubic catheter exchanged without complication.    Estimated Blood Loss: min    Drains: 20 Fr silicone suprapubic catheter     Procedure in Detail:  After informed consent was obtained the patient was brought to the cystoscopy suite and placed in the supine position.  SCDs were applied and working.   The patient was placed in the dorsal lithotomy position prior to anesthetic administration.  She was then anesthetized and prepped and draped in the usual sterile fashion.      A rigid cystoscope in a 22 Fr sheath was introduced into the patients's bladder via the urethra.  This passed easily. The urethra was not patulous. Formal cystoscopy was performed which revealed the ureteral orifices in their normal anatomic position bilaterally.  No bladder trabeculations, stones or diverticuli were seen. There were multiple areas of the bladder with slight erythema and bullous edema, consistent with catheterization. These areas have been previously biopsied.     We then proceeded with  botox injection.  200 units of botox in 20 cc was injected into the detrusor muscle throughout the bladder.  Good wheals were raised.  The scope was removed.      The suprapubic catheter was then exchanged under sterile conditions for a new 20 Fr silicone catheter filled with 30 mL of sterile water.    The patient tolerated the procedure well and was transferred to recovery in stable condition.     Disposition:  The patient will follow up with Dr. Mayo as needed.    MD ANH Chacko was present for the pertinent portion of the case and agree with the above note.

## 2020-12-16 NOTE — ANESTHESIA POSTPROCEDURE EVALUATION
Anesthesia Post Evaluation    Patient: Tasha Hawley    Procedure(s) Performed: Procedure(s) (LRB):  CYSTOSCOPY,WITH BOTULINUM TOXIN INJECTION 200 UNITS (N/A)  EXCHANGE, SUPRAPUBIC CATHETER    Final Anesthesia Type: general    Patient location during evaluation: PACU  Patient participation: Yes- Able to Participate  Level of consciousness: awake and alert  Post-procedure vital signs: reviewed and stable  Pain management: adequate  Airway patency: patent    PONV status at discharge: No PONV  Anesthetic complications: no      Cardiovascular status: stable  Respiratory status: unassisted and spontaneous ventilation  Hydration status: euvolemic  Follow-up not needed.          Vitals Value Taken Time   /62 12/15/20 1416   Temp 36.4 °C (97.5 °F) 12/15/20 1340   Pulse 42 12/15/20 1425   Resp 15 12/15/20 1425   SpO2 96 % 12/15/20 1425   Vitals shown include unvalidated device data.      No case tracking events are documented in the log.      Pain/Low Score: Low Score: 9 (12/15/2020  2:06 PM)

## 2020-12-18 ENCOUNTER — TELEPHONE (OUTPATIENT)
Dept: UROLOGY | Facility: CLINIC | Age: 65
End: 2020-12-18

## 2020-12-18 NOTE — TELEPHONE ENCOUNTER
Spoke with pt's  (Mr. Pierson) who states pt was traumatized at visit for botox procedure when nurses tried forcing a catheter into her urethra. She has been c/o bladder pressure and urgency for past couple days. Advised to have  nurse collect specimen from new catheter for urine culture to be done

## 2020-12-23 NOTE — TELEPHONE ENCOUNTER
----- Message from Elida Cummings sent at 12/23/2020 10:31 AM CST -----  Pt# [phone]    Pt wants to speak with Dr Mayo regarding her procedure

## 2020-12-24 ENCOUNTER — TELEPHONE (OUTPATIENT)
Dept: UROLOGY | Facility: CLINIC | Age: 65
End: 2020-12-24

## 2020-12-24 DIAGNOSIS — N39.0 RECURRENT UTI: Primary | ICD-10-CM

## 2020-12-24 PROCEDURE — G0179 PR HOME HEALTH MD RECERTIFICATION: ICD-10-PCS | Mod: ,,, | Performed by: UROLOGY

## 2020-12-24 PROCEDURE — G0179 MD RECERTIFICATION HHA PT: HCPCS | Mod: ,,, | Performed by: UROLOGY

## 2020-12-24 RX ORDER — NITROFURANTOIN 25; 75 MG/1; MG/1
100 CAPSULE ORAL 2 TIMES DAILY
Qty: 14 CAPSULE | Refills: 0 | Status: SHIPPED | OUTPATIENT
Start: 2020-12-24 | End: 2020-12-31

## 2020-12-24 NOTE — TELEPHONE ENCOUNTER
----- Message from Christi Wang LPN sent at 12/23/2020  9:19 AM CST -----  Regarding: FW: Home Health  Contact: Saray mike/[facility] [phone]  Saray ( nurse) was able to put in order through [facility]. States it is easier when you place order in Epic so she is wondering/asking if you can place a standing order for pt since she is so frequent?   Please advise.   ----- Message -----  From: Merna Henao LPN  Sent: 12/22/2020   3:49 PM CST  To: Christi Wang LPN  Subject: FW: Home Health                                    ----- Message -----  From: Bryanna Crowder  Sent: 12/22/2020   8:17 AM CST  To: [facility] Staff  Subject: Home Health                                      Calling in regards to changing catheter early to submit a specimen today. Orders would be needed to change for today. Please call to confirm

## 2020-12-24 NOTE — LETTER
December 24, 2020    Tasha Hawley  [address] [place] [address]             [facility] [address]  [address]  [facility] [address]  Phone: [phone] [facility],    Standing Order:  If the patient feels like she may have a bladder infection, may go out and change the suprapubic tube and collect urine from the new suprapubic tube and send for culture and sensitivity.      If the urine is sent to a lab outside the [facility] system, please fax to [phone].      Sincerely,        Shireen Mayo MD

## 2020-12-24 NOTE — TELEPHONE ENCOUNTER
Called and spoke to Mr. Dangelo Hawley,    Discussed that he had some concerns about his wife having been catheterized and experiencing pain the day of the procedure.  (at first, an attempt was made at obtaining a specimen from the suprapubic tube but they were unable to get it.  Then she was catheterized, had pain and it was stopped.  The suprapubic tube had been clamped so they were able to get the urine specimen.)     Subsequently, the catheter was changed and when the nurse placed it, the tip came out through the urethra.  Discussed that this can occur and discussed the mechanism.     Also, Mr. Hawley was unclear as to what the Botox does.  He was under the impression that it worked to close the urethra and we discussed that is used to treat the bladder spasms.  Also discussed the mechanism.      He expressed understanding.      Also the preliminary on the urine culture is enterococcus.  Macrobid was sent to [facility] pharmacy in [place].

## 2020-12-28 ENCOUNTER — TELEPHONE (OUTPATIENT)
Dept: UROLOGY | Facility: HOSPITAL | Age: 65
End: 2020-12-28

## 2020-12-29 ENCOUNTER — EXTERNAL HOME HEALTH (OUTPATIENT)
Dept: HOME HEALTH SERVICES | Facility: HOSPITAL | Age: 65
End: 2020-12-29
Payer: MEDICARE

## 2020-12-29 NOTE — TELEPHONE ENCOUNTER
----- Message from Christi Wang LPN sent at 12/28/2020  3:05 PM CST -----  Regarding: FW: problems  Contact: pt [phone]  Pt has +UCx resulted, asking for medication. Please advise.   ----- Message -----  From: Ashley Islas  Sent: 12/28/2020   2:32 PM CST  To: [facility] Staff  Subject: problems                                         Type:  Needs Medical Advice    Who Called:   Symptoms (please be specific): pressure  How long has patient had these symptoms:    Best Call Back Number: [phone]  Additional Information:

## 2020-12-29 NOTE — TELEPHONE ENCOUNTER
macrobid was sent in on 12/24/2020 and I spoke to Mr. Hawley on that date so he was aware that I sent in the prescription.

## 2021-01-07 ENCOUNTER — OFFICE VISIT (OUTPATIENT)
Dept: PSYCHIATRY | Facility: CLINIC | Age: 66
End: 2021-01-07
Payer: MEDICARE

## 2021-01-07 DIAGNOSIS — F41.9 ANXIETY: ICD-10-CM

## 2021-01-07 DIAGNOSIS — G47.00 INSOMNIA, UNSPECIFIED TYPE: ICD-10-CM

## 2021-01-07 PROCEDURE — 99499 RISK ADDL DX/OHS AUDIT: ICD-10-PCS | Mod: S$GLB,,, | Performed by: PSYCHIATRY & NEUROLOGY

## 2021-01-07 PROCEDURE — 99214 OFFICE O/P EST MOD 30 MIN: CPT | Mod: S$GLB,,, | Performed by: PSYCHIATRY & NEUROLOGY

## 2021-01-07 PROCEDURE — 99214 PR OFFICE/OUTPT VISIT, EST, LEVL IV, 30-39 MIN: ICD-10-PCS | Mod: S$GLB,,, | Performed by: PSYCHIATRY & NEUROLOGY

## 2021-01-07 PROCEDURE — 99499 UNLISTED E&M SERVICE: CPT | Mod: S$GLB,,, | Performed by: PSYCHIATRY & NEUROLOGY

## 2021-01-07 RX ORDER — QUETIAPINE FUMARATE 100 MG/1
TABLET, FILM COATED ORAL
Qty: 90 TABLET | Refills: 1 | Status: SHIPPED | OUTPATIENT
Start: 2021-01-07 | End: 2021-03-30 | Stop reason: SDUPTHER

## 2021-01-07 RX ORDER — FLUOXETINE HYDROCHLORIDE 20 MG/1
60 CAPSULE ORAL DAILY
Qty: 270 CAPSULE | Refills: 1 | Status: SHIPPED | OUTPATIENT
Start: 2021-01-07 | End: 2021-05-19 | Stop reason: SDUPTHER

## 2021-01-07 RX ORDER — QUETIAPINE FUMARATE 25 MG/1
TABLET, FILM COATED ORAL
Qty: 180 TABLET | Refills: 1 | Status: SHIPPED | OUTPATIENT
Start: 2021-01-07 | End: 2021-05-19 | Stop reason: SDUPTHER

## 2021-01-07 RX ORDER — TRAZODONE HYDROCHLORIDE 100 MG/1
300 TABLET ORAL NIGHTLY
Qty: 270 TABLET | Refills: 1 | Status: SHIPPED | OUTPATIENT
Start: 2021-01-07 | End: 2021-05-19 | Stop reason: SDUPTHER

## 2021-01-11 ENCOUNTER — TELEPHONE (OUTPATIENT)
Dept: INTERNAL MEDICINE | Facility: CLINIC | Age: 66
End: 2021-01-11

## 2021-01-11 ENCOUNTER — TELEPHONE (OUTPATIENT)
Dept: UROLOGY | Facility: CLINIC | Age: 66
End: 2021-01-11

## 2021-01-13 ENCOUNTER — HOSPITAL ENCOUNTER (OUTPATIENT)
Facility: HOSPITAL | Age: 66
Discharge: HOME | End: 2021-01-15
Attending: EMERGENCY MEDICINE | Admitting: FAMILY MEDICINE
Payer: MEDICARE

## 2021-01-13 ENCOUNTER — TELEPHONE (OUTPATIENT)
Dept: UROLOGY | Facility: CLINIC | Age: 66
End: 2021-01-13

## 2021-01-13 DIAGNOSIS — R25.1 TREMOR: Primary | ICD-10-CM

## 2021-01-13 DIAGNOSIS — I63.9 CVA (CEREBRAL VASCULAR ACCIDENT): ICD-10-CM

## 2021-01-13 DIAGNOSIS — I69.30 HISTORY OF STROKE WITH RESIDUAL DEFICIT: ICD-10-CM

## 2021-01-13 DIAGNOSIS — R53.1 LEFT-SIDED WEAKNESS: ICD-10-CM

## 2021-01-13 DIAGNOSIS — R53.1 WEAKNESS: ICD-10-CM

## 2021-01-13 DIAGNOSIS — I63.9 CEREBROVASCULAR ACCIDENT (CVA), UNSPECIFIED MECHANISM: ICD-10-CM

## 2021-01-13 DIAGNOSIS — I63.9 STROKE: ICD-10-CM

## 2021-01-13 PROBLEM — R29.898 WEAKNESS OF EXTREMITY: Status: ACTIVE | Noted: 2021-01-13

## 2021-01-13 LAB
ALBUMIN SERPL BCP-MCNC: 3.9 G/DL (ref 3.5–5.2)
ALP SERPL-CCNC: 112 U/L (ref 55–135)
ALT SERPL W/O P-5'-P-CCNC: 16 U/L (ref 10–44)
AMPHET+METHAMPHET UR QL: NEGATIVE
ANION GAP SERPL CALC-SCNC: 12 MMOL/L (ref 8–16)
AST SERPL-CCNC: 18 U/L (ref 10–40)
BARBITURATES UR QL SCN>200 NG/ML: NEGATIVE
BASOPHILS # BLD AUTO: 0.01 K/UL (ref 0–0.2)
BASOPHILS NFR BLD: 0.2 % (ref 0–1.9)
BENZODIAZ UR QL SCN>200 NG/ML: NEGATIVE
BILIRUB SERPL-MCNC: 0.3 MG/DL (ref 0.1–1)
BILIRUB UR QL STRIP: NEGATIVE
BUN SERPL-MCNC: 9 MG/DL (ref 8–23)
BZE UR QL SCN: NEGATIVE
CALCIUM SERPL-MCNC: 8.7 MG/DL (ref 8.7–10.5)
CANNABINOIDS UR QL SCN: NEGATIVE
CHLORIDE SERPL-SCNC: 97 MMOL/L (ref 95–110)
CHOLEST SERPL-MCNC: 180 MG/DL (ref 120–199)
CHOLEST/HDLC SERPL: 2.8 {RATIO} (ref 2–5)
CK SERPL-CCNC: 87 U/L (ref 20–180)
CLARITY UR: CLEAR
CO2 SERPL-SCNC: 32 MMOL/L (ref 23–29)
COLOR UR: YELLOW
CREAT SERPL-MCNC: 1 MG/DL (ref 0.5–1.4)
CREAT UR-MCNC: 36.3 MG/DL (ref 15–325)
CTP QC/QA: YES
DIFFERENTIAL METHOD: NORMAL
EOSINOPHIL # BLD AUTO: 0.2 K/UL (ref 0–0.5)
EOSINOPHIL NFR BLD: 3.7 % (ref 0–8)
ERYTHROCYTE [DISTWIDTH] IN BLOOD BY AUTOMATED COUNT: 14 % (ref 11.5–14.5)
EST. GFR  (AFRICAN AMERICAN): >60 ML/MIN/1.73 M^2
EST. GFR  (NON AFRICAN AMERICAN): 60 ML/MIN/1.73 M^2
GLUCOSE SERPL-MCNC: 97 MG/DL (ref 70–110)
GLUCOSE UR QL STRIP: NEGATIVE
HCT VFR BLD AUTO: 37.4 % (ref 37–48.5)
HDLC SERPL-MCNC: 65 MG/DL (ref 40–75)
HDLC SERPL: 36.1 % (ref 20–50)
HGB BLD-MCNC: 12 G/DL (ref 12–16)
HGB UR QL STRIP: ABNORMAL
IMM GRANULOCYTES # BLD AUTO: 0.01 K/UL (ref 0–0.04)
IMM GRANULOCYTES NFR BLD AUTO: 0.2 % (ref 0–0.5)
INR PPP: 1 (ref 0.8–1.2)
KETONES UR QL STRIP: NEGATIVE
LDLC SERPL CALC-MCNC: 99.6 MG/DL (ref 63–159)
LEUKOCYTE ESTERASE UR QL STRIP: ABNORMAL
LYMPHOCYTES # BLD AUTO: 1.5 K/UL (ref 1–4.8)
LYMPHOCYTES NFR BLD: 29.1 % (ref 18–48)
MCH RBC QN AUTO: 27.9 PG (ref 27–31)
MCHC RBC AUTO-ENTMCNC: 32.1 G/DL (ref 32–36)
MCV RBC AUTO: 87 FL (ref 82–98)
METHADONE UR QL SCN>300 NG/ML: NEGATIVE
MICROSCOPIC COMMENT: ABNORMAL
MONOCYTES # BLD AUTO: 0.5 K/UL (ref 0.3–1)
MONOCYTES NFR BLD: 9 % (ref 4–15)
NEUTROPHILS # BLD AUTO: 2.9 K/UL (ref 1.8–7.7)
NEUTROPHILS NFR BLD: 57.8 % (ref 38–73)
NITRITE UR QL STRIP: NEGATIVE
NONHDLC SERPL-MCNC: 115 MG/DL
NRBC BLD-RTO: 0 /100 WBC
OPIATES UR QL SCN: NORMAL
PCP UR QL SCN>25 NG/ML: NEGATIVE
PH UR STRIP: 7 [PH] (ref 5–8)
PLATELET # BLD AUTO: 200 K/UL (ref 150–350)
PMV BLD AUTO: 10.2 FL (ref 9.2–12.9)
POCT GLUCOSE: 92 MG/DL (ref 70–110)
POTASSIUM SERPL-SCNC: 3.7 MMOL/L (ref 3.5–5.1)
PROT SERPL-MCNC: 7.1 G/DL (ref 6–8.4)
PROT UR QL STRIP: NEGATIVE
PROTHROMBIN TIME: 10.3 SEC (ref 9–12.5)
RBC # BLD AUTO: 4.3 M/UL (ref 4–5.4)
RBC #/AREA URNS HPF: 10 /HPF (ref 0–4)
SARS-COV-2 RDRP RESP QL NAA+PROBE: NEGATIVE
SODIUM SERPL-SCNC: 141 MMOL/L (ref 136–145)
SP GR UR STRIP: 1 (ref 1–1.03)
TOXICOLOGY INFORMATION: NORMAL
TRIGL SERPL-MCNC: 77 MG/DL (ref 30–150)
TSH SERPL DL<=0.005 MIU/L-ACNC: 2.25 UIU/ML (ref 0.4–4)
URN SPEC COLLECT METH UR: ABNORMAL
UROBILINOGEN UR STRIP-ACNC: NEGATIVE EU/DL
WBC # BLD AUTO: 5.09 K/UL (ref 3.9–12.7)
WBC #/AREA URNS HPF: 3 /HPF (ref 0–5)

## 2021-01-13 PROCEDURE — 82962 GLUCOSE BLOOD TEST: CPT

## 2021-01-13 PROCEDURE — 96376 TX/PRO/DX INJ SAME DRUG ADON: CPT

## 2021-01-13 PROCEDURE — 25000003 PHARM REV CODE 250: Performed by: PHYSICIAN ASSISTANT

## 2021-01-13 PROCEDURE — 93010 EKG 12-LEAD: ICD-10-PCS | Mod: ,,, | Performed by: INTERNAL MEDICINE

## 2021-01-13 PROCEDURE — 84443 ASSAY THYROID STIM HORMONE: CPT

## 2021-01-13 PROCEDURE — 81000 URINALYSIS NONAUTO W/SCOPE: CPT | Mod: 59

## 2021-01-13 PROCEDURE — G0378 HOSPITAL OBSERVATION PER HR: HCPCS

## 2021-01-13 PROCEDURE — 85610 PROTHROMBIN TIME: CPT

## 2021-01-13 PROCEDURE — 80061 LIPID PANEL: CPT

## 2021-01-13 PROCEDURE — 63600175 PHARM REV CODE 636 W HCPCS: Performed by: PHYSICIAN ASSISTANT

## 2021-01-13 PROCEDURE — 93005 ELECTROCARDIOGRAM TRACING: CPT

## 2021-01-13 PROCEDURE — 93010 ELECTROCARDIOGRAM REPORT: CPT | Mod: ,,, | Performed by: INTERNAL MEDICINE

## 2021-01-13 PROCEDURE — 99285 EMERGENCY DEPT VISIT HI MDM: CPT | Mod: 25

## 2021-01-13 PROCEDURE — 80053 COMPREHEN METABOLIC PANEL: CPT

## 2021-01-13 PROCEDURE — 80307 DRUG TEST PRSMV CHEM ANLYZR: CPT

## 2021-01-13 PROCEDURE — 63600175 PHARM REV CODE 636 W HCPCS: Performed by: NURSE PRACTITIONER

## 2021-01-13 PROCEDURE — 96375 TX/PRO/DX INJ NEW DRUG ADDON: CPT

## 2021-01-13 PROCEDURE — 85025 COMPLETE CBC W/AUTO DIFF WBC: CPT

## 2021-01-13 PROCEDURE — U0002 COVID-19 LAB TEST NON-CDC: HCPCS | Performed by: PHYSICIAN ASSISTANT

## 2021-01-13 PROCEDURE — 96374 THER/PROPH/DIAG INJ IV PUSH: CPT

## 2021-01-13 PROCEDURE — 82550 ASSAY OF CK (CPK): CPT

## 2021-01-13 PROCEDURE — 96361 HYDRATE IV INFUSION ADD-ON: CPT

## 2021-01-13 RX ORDER — FLUOXETINE HYDROCHLORIDE 20 MG/1
60 CAPSULE ORAL DAILY
Status: DISCONTINUED | OUTPATIENT
Start: 2021-01-14 | End: 2021-01-16 | Stop reason: HOSPADM

## 2021-01-13 RX ORDER — PANTOPRAZOLE SODIUM 40 MG/1
40 TABLET, DELAYED RELEASE ORAL DAILY
Status: DISCONTINUED | OUTPATIENT
Start: 2021-01-14 | End: 2021-01-16 | Stop reason: HOSPADM

## 2021-01-13 RX ORDER — GABAPENTIN 100 MG/1
100 CAPSULE ORAL 3 TIMES DAILY
Status: DISCONTINUED | OUTPATIENT
Start: 2021-01-14 | End: 2021-01-16 | Stop reason: HOSPADM

## 2021-01-13 RX ORDER — KETOROLAC TROMETHAMINE 30 MG/ML
15 INJECTION, SOLUTION INTRAMUSCULAR; INTRAVENOUS EVERY 6 HOURS PRN
Status: COMPLETED | OUTPATIENT
Start: 2021-01-13 | End: 2021-01-14

## 2021-01-13 RX ORDER — ONDANSETRON 2 MG/ML
4 INJECTION INTRAMUSCULAR; INTRAVENOUS EVERY 12 HOURS PRN
Status: DISCONTINUED | OUTPATIENT
Start: 2021-01-13 | End: 2021-01-16 | Stop reason: HOSPADM

## 2021-01-13 RX ORDER — SODIUM CHLORIDE 0.9 % (FLUSH) 0.9 %
10 SYRINGE (ML) INJECTION
Status: DISCONTINUED | OUTPATIENT
Start: 2021-01-13 | End: 2021-01-16 | Stop reason: HOSPADM

## 2021-01-13 RX ORDER — CLOPIDOGREL BISULFATE 75 MG/1
75 TABLET ORAL DAILY
Status: DISCONTINUED | OUTPATIENT
Start: 2021-01-14 | End: 2021-01-16 | Stop reason: HOSPADM

## 2021-01-13 RX ORDER — KETOROLAC TROMETHAMINE 30 MG/ML
15 INJECTION, SOLUTION INTRAMUSCULAR; INTRAVENOUS
Status: COMPLETED | OUTPATIENT
Start: 2021-01-13 | End: 2021-01-13

## 2021-01-13 RX ORDER — POTASSIUM CHLORIDE 750 MG/1
10 TABLET, EXTENDED RELEASE ORAL DAILY
Status: DISCONTINUED | OUTPATIENT
Start: 2021-01-14 | End: 2021-01-16 | Stop reason: HOSPADM

## 2021-01-13 RX ORDER — QUETIAPINE FUMARATE 100 MG/1
100 TABLET, FILM COATED ORAL NIGHTLY
Status: DISCONTINUED | OUTPATIENT
Start: 2021-01-13 | End: 2021-01-16 | Stop reason: HOSPADM

## 2021-01-13 RX ORDER — TORSEMIDE 20 MG/1
100 TABLET ORAL DAILY
Status: DISCONTINUED | OUTPATIENT
Start: 2021-01-14 | End: 2021-01-16 | Stop reason: HOSPADM

## 2021-01-13 RX ORDER — ATORVASTATIN CALCIUM 40 MG/1
80 TABLET, FILM COATED ORAL NIGHTLY
Status: DISCONTINUED | OUTPATIENT
Start: 2021-01-13 | End: 2021-01-16 | Stop reason: HOSPADM

## 2021-01-13 RX ORDER — ASPIRIN 81 MG/1
81 TABLET ORAL DAILY
Status: DISCONTINUED | OUTPATIENT
Start: 2021-01-14 | End: 2021-01-16 | Stop reason: HOSPADM

## 2021-01-13 RX ORDER — ENOXAPARIN SODIUM 100 MG/ML
40 INJECTION SUBCUTANEOUS EVERY 24 HOURS
Status: DISCONTINUED | OUTPATIENT
Start: 2021-01-13 | End: 2021-01-16 | Stop reason: HOSPADM

## 2021-01-13 RX ORDER — LABETALOL HYDROCHLORIDE 5 MG/ML
10 INJECTION, SOLUTION INTRAVENOUS
Status: DISCONTINUED | OUTPATIENT
Start: 2021-01-13 | End: 2021-01-16 | Stop reason: HOSPADM

## 2021-01-13 RX ORDER — SENNOSIDES 8.6 MG/1
2 TABLET ORAL 2 TIMES DAILY
Status: DISCONTINUED | OUTPATIENT
Start: 2021-01-14 | End: 2021-01-16 | Stop reason: HOSPADM

## 2021-01-13 RX ORDER — TRAZODONE HYDROCHLORIDE 100 MG/1
300 TABLET ORAL NIGHTLY
Status: DISCONTINUED | OUTPATIENT
Start: 2021-01-14 | End: 2021-01-14

## 2021-01-13 RX ADMIN — SODIUM CHLORIDE 1000 ML: 0.9 INJECTION, SOLUTION INTRAVENOUS at 09:01

## 2021-01-13 RX ADMIN — KETOROLAC TROMETHAMINE 15 MG: 30 INJECTION, SOLUTION INTRAMUSCULAR; INTRAVENOUS at 10:01

## 2021-01-13 RX ADMIN — LORAZEPAM 1 MG: 2 INJECTION INTRAMUSCULAR; INTRAVENOUS at 08:01

## 2021-01-13 RX ADMIN — KETOROLAC TROMETHAMINE 15 MG: 30 INJECTION, SOLUTION INTRAMUSCULAR at 09:01

## 2021-01-14 ENCOUNTER — DOCUMENT SCAN (OUTPATIENT)
Dept: HOME HEALTH SERVICES | Facility: HOSPITAL | Age: 66
End: 2021-01-14
Payer: MEDICAID

## 2021-01-14 ENCOUNTER — TELEPHONE (OUTPATIENT)
Dept: NEUROLOGY | Facility: CLINIC | Age: 66
End: 2021-01-14

## 2021-01-14 PROBLEM — I69.30 HISTORY OF STROKE WITH RESIDUAL DEFICIT: Status: ACTIVE | Noted: 2021-01-14

## 2021-01-14 PROBLEM — R53.1 LEFT-SIDED WEAKNESS: Status: ACTIVE | Noted: 2021-01-14

## 2021-01-14 LAB
ALBUMIN SERPL BCP-MCNC: 3.4 G/DL (ref 3.5–5.2)
ALP SERPL-CCNC: 95 U/L (ref 55–135)
ALT SERPL W/O P-5'-P-CCNC: 14 U/L (ref 10–44)
ANION GAP SERPL CALC-SCNC: 10 MMOL/L (ref 8–16)
AORTIC ROOT ANNULUS: 3.39 CM
APTT BLDCRRT: 27.9 SEC (ref 21–32)
ASCENDING AORTA: 2.68 CM
AST SERPL-CCNC: 16 U/L (ref 10–40)
AV INDEX (PROSTH): 0.86
AV MEAN GRADIENT: 5 MMHG
AV PEAK GRADIENT: 9 MMHG
AV VALVE AREA: 2.5 CM2
AV VELOCITY RATIO: 0.77
BASOPHILS # BLD AUTO: 0.01 K/UL (ref 0–0.2)
BASOPHILS NFR BLD: 0.2 % (ref 0–1.9)
BILIRUB SERPL-MCNC: 0.3 MG/DL (ref 0.1–1)
BSA FOR ECHO PROCEDURE: 1.97 M2
BUN SERPL-MCNC: 11 MG/DL (ref 8–23)
CALCIUM SERPL-MCNC: 8.6 MG/DL (ref 8.7–10.5)
CHLORIDE SERPL-SCNC: 100 MMOL/L (ref 95–110)
CO2 SERPL-SCNC: 31 MMOL/L (ref 23–29)
CREAT SERPL-MCNC: 1.1 MG/DL (ref 0.5–1.4)
CV ECHO LV RWT: 0.56 CM
DIFFERENTIAL METHOD: ABNORMAL
DOP CALC AO PEAK VEL: 1.52 M/S
DOP CALC AO VTI: 31.08 CM
DOP CALC LVOT AREA: 2.9 CM2
DOP CALC LVOT DIAMETER: 1.92 CM
DOP CALC LVOT PEAK VEL: 1.17 M/S
DOP CALC LVOT STROKE VOLUME: 77.55 CM3
DOP CALCLVOT PEAK VEL VTI: 26.8 CM
E WAVE DECELERATION TIME: 230.18 MSEC
E/A RATIO: 0.86
E/E' RATIO: 11.86 M/S
ECHO LV POSTERIOR WALL: 1.23 CM (ref 0.6–1.1)
EOSINOPHIL # BLD AUTO: 0.3 K/UL (ref 0–0.5)
EOSINOPHIL NFR BLD: 6.6 % (ref 0–8)
ERYTHROCYTE [DISTWIDTH] IN BLOOD BY AUTOMATED COUNT: 13.9 % (ref 11.5–14.5)
EST. GFR  (AFRICAN AMERICAN): >60 ML/MIN/1.73 M^2
EST. GFR  (NON AFRICAN AMERICAN): 53 ML/MIN/1.73 M^2
ESTIMATED AVG GLUCOSE: 97 MG/DL (ref 68–131)
FRACTIONAL SHORTENING: 27 % (ref 28–44)
GLUCOSE SERPL-MCNC: 101 MG/DL (ref 70–110)
HBA1C MFR BLD HPLC: 5 % (ref 4–5.6)
HCT VFR BLD AUTO: 34.5 % (ref 37–48.5)
HGB BLD-MCNC: 11 G/DL (ref 12–16)
IMM GRANULOCYTES # BLD AUTO: 0.01 K/UL (ref 0–0.04)
IMM GRANULOCYTES NFR BLD AUTO: 0.2 % (ref 0–0.5)
INR PPP: 1 (ref 0.8–1.2)
INTERVENTRICULAR SEPTUM: 1.2 CM (ref 0.6–1.1)
LA MAJOR: 5.98 CM
LA MINOR: 5.71 CM
LA WIDTH: 4.35 CM
LEFT ATRIUM SIZE: 3.19 CM
LEFT ATRIUM VOLUME INDEX MOD: 38.6 ML/M2
LEFT ATRIUM VOLUME INDEX: 35.9 ML/M2
LEFT ATRIUM VOLUME MOD: 74.02 CM3
LEFT ATRIUM VOLUME: 68.91 CM3
LEFT INTERNAL DIMENSION IN SYSTOLE: 3.21 CM (ref 2.1–4)
LEFT VENTRICLE DIASTOLIC VOLUME INDEX: 45.22 ML/M2
LEFT VENTRICLE DIASTOLIC VOLUME: 86.73 ML
LEFT VENTRICLE MASS INDEX: 101 G/M2
LEFT VENTRICLE SYSTOLIC VOLUME INDEX: 21.6 ML/M2
LEFT VENTRICLE SYSTOLIC VOLUME: 41.34 ML
LEFT VENTRICULAR INTERNAL DIMENSION IN DIASTOLE: 4.38 CM (ref 3.5–6)
LEFT VENTRICULAR MASS: 193.45 G
LV LATERAL E/E' RATIO: 9.22 M/S
LV SEPTAL E/E' RATIO: 16.6 M/S
LYMPHOCYTES # BLD AUTO: 1.7 K/UL (ref 1–4.8)
LYMPHOCYTES NFR BLD: 38.8 % (ref 18–48)
MAGNESIUM SERPL-MCNC: 1.6 MG/DL (ref 1.6–2.6)
MCH RBC QN AUTO: 27.9 PG (ref 27–31)
MCHC RBC AUTO-ENTMCNC: 31.9 G/DL (ref 32–36)
MCV RBC AUTO: 88 FL (ref 82–98)
MONOCYTES # BLD AUTO: 0.4 K/UL (ref 0.3–1)
MONOCYTES NFR BLD: 9.9 % (ref 4–15)
MV A" WAVE DURATION": 11.99 MSEC
MV PEAK A VEL: 0.97 M/S
MV PEAK E VEL: 0.83 M/S
NEUTROPHILS # BLD AUTO: 1.9 K/UL (ref 1.8–7.7)
NEUTROPHILS NFR BLD: 44.3 % (ref 38–73)
NRBC BLD-RTO: 0 /100 WBC
PHOSPHATE SERPL-MCNC: 4 MG/DL (ref 2.7–4.5)
PISA TR MAX VEL: 2.04 M/S
PLATELET # BLD AUTO: 176 K/UL (ref 150–350)
PMV BLD AUTO: 9.9 FL (ref 9.2–12.9)
POTASSIUM SERPL-SCNC: 3.2 MMOL/L (ref 3.5–5.1)
PROT SERPL-MCNC: 6.2 G/DL (ref 6–8.4)
PROTHROMBIN TIME: 10.9 SEC (ref 9–12.5)
PULM VEIN S/D RATIO: 1.55
PV PEAK D VEL: 0.31 M/S
PV PEAK S VEL: 0.48 M/S
PV PEAK VELOCITY: 1.18 CM/S
RA MAJOR: 5.41 CM
RA PRESSURE: 3 MMHG
RA WIDTH: 3.23 CM
RBC # BLD AUTO: 3.94 M/UL (ref 4–5.4)
RIGHT VENTRICULAR END-DIASTOLIC DIMENSION: 2.71 CM
RV TISSUE DOPPLER FREE WALL SYSTOLIC VELOCITY 1 (APICAL 4 CHAMBER VIEW): 11.72 CM/S
SODIUM SERPL-SCNC: 141 MMOL/L (ref 136–145)
STJ: 2.49 CM
TDI LATERAL: 0.09 M/S
TDI SEPTAL: 0.05 M/S
TDI: 0.07 M/S
TR MAX PG: 17 MMHG
TRICUSPID ANNULAR PLANE SYSTOLIC EXCURSION: 2.3 CM
TV REST PULMONARY ARTERY PRESSURE: 20 MMHG
WBC # BLD AUTO: 4.25 K/UL (ref 3.9–12.7)

## 2021-01-14 PROCEDURE — 85610 PROTHROMBIN TIME: CPT

## 2021-01-14 PROCEDURE — 85025 COMPLETE CBC W/AUTO DIFF WBC: CPT

## 2021-01-14 PROCEDURE — 85730 THROMBOPLASTIN TIME PARTIAL: CPT

## 2021-01-14 PROCEDURE — 96376 TX/PRO/DX INJ SAME DRUG ADON: CPT | Mod: 59

## 2021-01-14 PROCEDURE — G0427 INPT/ED TELECONSULT70: HCPCS | Mod: 95,,, | Performed by: NURSE PRACTITIONER

## 2021-01-14 PROCEDURE — 25000003 PHARM REV CODE 250: Performed by: NURSE PRACTITIONER

## 2021-01-14 PROCEDURE — 36415 COLL VENOUS BLD VENIPUNCTURE: CPT

## 2021-01-14 PROCEDURE — 97535 SELF CARE MNGMENT TRAINING: CPT

## 2021-01-14 PROCEDURE — 97162 PT EVAL MOD COMPLEX 30 MIN: CPT

## 2021-01-14 PROCEDURE — 84100 ASSAY OF PHOSPHORUS: CPT

## 2021-01-14 PROCEDURE — 97802 MEDICAL NUTRITION INDIV IN: CPT

## 2021-01-14 PROCEDURE — 94761 N-INVAS EAR/PLS OXIMETRY MLT: CPT

## 2021-01-14 PROCEDURE — 63600175 PHARM REV CODE 636 W HCPCS: Performed by: NURSE PRACTITIONER

## 2021-01-14 PROCEDURE — 25500020 PHARM REV CODE 255: Performed by: FAMILY MEDICINE

## 2021-01-14 PROCEDURE — 83735 ASSAY OF MAGNESIUM: CPT

## 2021-01-14 PROCEDURE — G0427 PR INPT TELEHEALTH CON 70/>M: ICD-10-PCS | Mod: 95,,, | Performed by: NURSE PRACTITIONER

## 2021-01-14 PROCEDURE — 92610 EVALUATE SWALLOWING FUNCTION: CPT

## 2021-01-14 PROCEDURE — 97530 THERAPEUTIC ACTIVITIES: CPT

## 2021-01-14 PROCEDURE — 83036 HEMOGLOBIN GLYCOSYLATED A1C: CPT

## 2021-01-14 PROCEDURE — G0378 HOSPITAL OBSERVATION PER HR: HCPCS

## 2021-01-14 PROCEDURE — 97165 OT EVAL LOW COMPLEX 30 MIN: CPT

## 2021-01-14 PROCEDURE — 80053 COMPREHEN METABOLIC PANEL: CPT

## 2021-01-14 PROCEDURE — 96372 THER/PROPH/DIAG INJ SC/IM: CPT | Mod: 59

## 2021-01-14 RX ORDER — ACETAMINOPHEN 325 MG/1
650 TABLET ORAL EVERY 6 HOURS PRN
Status: DISCONTINUED | OUTPATIENT
Start: 2021-01-14 | End: 2021-01-16 | Stop reason: HOSPADM

## 2021-01-14 RX ORDER — HYDROCODONE BITARTRATE AND ACETAMINOPHEN 10; 325 MG/1; MG/1
1 TABLET ORAL EVERY 6 HOURS PRN
Status: DISCONTINUED | OUTPATIENT
Start: 2021-01-14 | End: 2021-01-16 | Stop reason: HOSPADM

## 2021-01-14 RX ADMIN — SENNOSIDES 2 TABLET: 8.6 TABLET, FILM COATED ORAL at 09:01

## 2021-01-14 RX ADMIN — IOHEXOL 100 ML: 350 INJECTION, SOLUTION INTRAVENOUS at 01:01

## 2021-01-14 RX ADMIN — ATORVASTATIN CALCIUM 80 MG: 40 TABLET, FILM COATED ORAL at 09:01

## 2021-01-14 RX ADMIN — KETOROLAC TROMETHAMINE 15 MG: 30 INJECTION, SOLUTION INTRAMUSCULAR; INTRAVENOUS at 09:01

## 2021-01-14 RX ADMIN — GABAPENTIN 100 MG: 100 CAPSULE ORAL at 09:01

## 2021-01-14 RX ADMIN — QUETIAPINE FUMARATE 100 MG: 100 TABLET ORAL at 12:01

## 2021-01-14 RX ADMIN — TORSEMIDE 100 MG: 20 TABLET ORAL at 09:01

## 2021-01-14 RX ADMIN — PANTOPRAZOLE SODIUM 40 MG: 40 TABLET, DELAYED RELEASE ORAL at 09:01

## 2021-01-14 RX ADMIN — KETOROLAC TROMETHAMINE 15 MG: 30 INJECTION, SOLUTION INTRAMUSCULAR; INTRAVENOUS at 11:01

## 2021-01-14 RX ADMIN — ASPIRIN 81 MG: 81 TABLET, COATED ORAL at 09:01

## 2021-01-14 RX ADMIN — POTASSIUM CHLORIDE 10 MEQ: 750 TABLET, EXTENDED RELEASE ORAL at 09:01

## 2021-01-14 RX ADMIN — ENOXAPARIN SODIUM 40 MG: 40 INJECTION SUBCUTANEOUS at 05:01

## 2021-01-14 RX ADMIN — CLOPIDOGREL 75 MG: 75 TABLET, FILM COATED ORAL at 09:01

## 2021-01-14 RX ADMIN — LEVOTHYROXINE SODIUM 125 MCG: 75 TABLET ORAL at 05:01

## 2021-01-14 RX ADMIN — LORAZEPAM 0.5 MG: 2 INJECTION INTRAMUSCULAR at 11:01

## 2021-01-14 RX ADMIN — ATORVASTATIN CALCIUM 80 MG: 40 TABLET, FILM COATED ORAL at 12:01

## 2021-01-14 RX ADMIN — QUETIAPINE FUMARATE 100 MG: 100 TABLET ORAL at 09:01

## 2021-01-14 RX ADMIN — GABAPENTIN 100 MG: 100 CAPSULE ORAL at 03:01

## 2021-01-14 RX ADMIN — HYDROCODONE BITARTRATE AND ACETAMINOPHEN 1 TABLET: 10; 325 TABLET ORAL at 05:01

## 2021-01-14 RX ADMIN — FLUOXETINE 60 MG: 20 CAPSULE ORAL at 09:01

## 2021-01-14 RX ADMIN — ACETAMINOPHEN 650 MG: 325 TABLET ORAL at 12:01

## 2021-01-15 VITALS
TEMPERATURE: 97 F | SYSTOLIC BLOOD PRESSURE: 103 MMHG | HEART RATE: 63 BPM | WEIGHT: 186 LBS | DIASTOLIC BLOOD PRESSURE: 68 MMHG | HEIGHT: 65 IN | BODY MASS INDEX: 30.99 KG/M2 | RESPIRATION RATE: 18 BRPM | OXYGEN SATURATION: 95 %

## 2021-01-15 LAB
ALBUMIN SERPL BCP-MCNC: 3.4 G/DL (ref 3.5–5.2)
ALP SERPL-CCNC: 98 U/L (ref 55–135)
ALT SERPL W/O P-5'-P-CCNC: 16 U/L (ref 10–44)
ANION GAP SERPL CALC-SCNC: 11 MMOL/L (ref 8–16)
AST SERPL-CCNC: 17 U/L (ref 10–40)
BASOPHILS # BLD AUTO: 0.01 K/UL (ref 0–0.2)
BASOPHILS NFR BLD: 0.2 % (ref 0–1.9)
BILIRUB SERPL-MCNC: 0.4 MG/DL (ref 0.1–1)
BUN SERPL-MCNC: 14 MG/DL (ref 8–23)
CALCIUM SERPL-MCNC: 8.7 MG/DL (ref 8.7–10.5)
CHLORIDE SERPL-SCNC: 97 MMOL/L (ref 95–110)
CO2 SERPL-SCNC: 31 MMOL/L (ref 23–29)
CREAT SERPL-MCNC: 1.1 MG/DL (ref 0.5–1.4)
DIFFERENTIAL METHOD: ABNORMAL
EOSINOPHIL # BLD AUTO: 0.5 K/UL (ref 0–0.5)
EOSINOPHIL NFR BLD: 10.3 % (ref 0–8)
ERYTHROCYTE [DISTWIDTH] IN BLOOD BY AUTOMATED COUNT: 13.9 % (ref 11.5–14.5)
EST. GFR  (AFRICAN AMERICAN): >60 ML/MIN/1.73 M^2
EST. GFR  (NON AFRICAN AMERICAN): 53 ML/MIN/1.73 M^2
GLUCOSE SERPL-MCNC: 96 MG/DL (ref 70–110)
HCT VFR BLD AUTO: 36.2 % (ref 37–48.5)
HGB BLD-MCNC: 11.5 G/DL (ref 12–16)
IMM GRANULOCYTES # BLD AUTO: 0 K/UL (ref 0–0.04)
IMM GRANULOCYTES NFR BLD AUTO: 0 % (ref 0–0.5)
LYMPHOCYTES # BLD AUTO: 1.8 K/UL (ref 1–4.8)
LYMPHOCYTES NFR BLD: 41.7 % (ref 18–48)
MCH RBC QN AUTO: 28 PG (ref 27–31)
MCHC RBC AUTO-ENTMCNC: 31.8 G/DL (ref 32–36)
MCV RBC AUTO: 88 FL (ref 82–98)
MONOCYTES # BLD AUTO: 0.4 K/UL (ref 0.3–1)
MONOCYTES NFR BLD: 9.6 % (ref 4–15)
NEUTROPHILS # BLD AUTO: 1.7 K/UL (ref 1.8–7.7)
NEUTROPHILS NFR BLD: 38.2 % (ref 38–73)
NRBC BLD-RTO: 0 /100 WBC
PLATELET # BLD AUTO: 176 K/UL (ref 150–350)
PMV BLD AUTO: 10.1 FL (ref 9.2–12.9)
POTASSIUM SERPL-SCNC: 3.7 MMOL/L (ref 3.5–5.1)
PROT SERPL-MCNC: 6.3 G/DL (ref 6–8.4)
RBC # BLD AUTO: 4.1 M/UL (ref 4–5.4)
SODIUM SERPL-SCNC: 139 MMOL/L (ref 136–145)
WBC # BLD AUTO: 4.39 K/UL (ref 3.9–12.7)

## 2021-01-15 PROCEDURE — 63600175 PHARM REV CODE 636 W HCPCS: Performed by: NURSE PRACTITIONER

## 2021-01-15 PROCEDURE — 25000003 PHARM REV CODE 250: Performed by: NURSE PRACTITIONER

## 2021-01-15 PROCEDURE — 36415 COLL VENOUS BLD VENIPUNCTURE: CPT

## 2021-01-15 PROCEDURE — G0408 INPT/TELE FOLLOW UP 35: HCPCS | Mod: 95,,, | Performed by: NURSE PRACTITIONER

## 2021-01-15 PROCEDURE — 96376 TX/PRO/DX INJ SAME DRUG ADON: CPT

## 2021-01-15 PROCEDURE — G0408 PR TELHEALTH INPT CONSULT 35MIN: ICD-10-PCS | Mod: 95,,, | Performed by: NURSE PRACTITIONER

## 2021-01-15 PROCEDURE — 97530 THERAPEUTIC ACTIVITIES: CPT | Mod: CO

## 2021-01-15 PROCEDURE — 94761 N-INVAS EAR/PLS OXIMETRY MLT: CPT

## 2021-01-15 PROCEDURE — 85025 COMPLETE CBC W/AUTO DIFF WBC: CPT

## 2021-01-15 PROCEDURE — 95819 PR EEG,W/AWAKE & ASLEEP RECORD: ICD-10-PCS | Mod: 26,,, | Performed by: PSYCHIATRY & NEUROLOGY

## 2021-01-15 PROCEDURE — 97116 GAIT TRAINING THERAPY: CPT

## 2021-01-15 PROCEDURE — 95819 EEG AWAKE AND ASLEEP: CPT | Mod: 26,,, | Performed by: PSYCHIATRY & NEUROLOGY

## 2021-01-15 PROCEDURE — G0378 HOSPITAL OBSERVATION PER HR: HCPCS

## 2021-01-15 PROCEDURE — 97535 SELF CARE MNGMENT TRAINING: CPT | Mod: CO,59

## 2021-01-15 PROCEDURE — 80053 COMPREHEN METABOLIC PANEL: CPT

## 2021-01-15 PROCEDURE — 96372 THER/PROPH/DIAG INJ SC/IM: CPT | Mod: 59

## 2021-01-15 PROCEDURE — 95819 EEG AWAKE AND ASLEEP: CPT

## 2021-01-15 RX ORDER — CLOPIDOGREL BISULFATE 75 MG/1
75 TABLET ORAL DAILY
Qty: 21 TABLET | Refills: 0 | Status: SHIPPED | OUTPATIENT
Start: 2021-01-16 | End: 2021-02-05

## 2021-01-15 RX ADMIN — GABAPENTIN 100 MG: 100 CAPSULE ORAL at 03:01

## 2021-01-15 RX ADMIN — PANTOPRAZOLE SODIUM 40 MG: 40 TABLET, DELAYED RELEASE ORAL at 10:01

## 2021-01-15 RX ADMIN — TORSEMIDE 100 MG: 20 TABLET ORAL at 10:01

## 2021-01-15 RX ADMIN — ENOXAPARIN SODIUM 40 MG: 40 INJECTION SUBCUTANEOUS at 05:01

## 2021-01-15 RX ADMIN — ASPIRIN 81 MG: 81 TABLET, COATED ORAL at 10:01

## 2021-01-15 RX ADMIN — GABAPENTIN 100 MG: 100 CAPSULE ORAL at 09:01

## 2021-01-15 RX ADMIN — HYDROCODONE BITARTRATE AND ACETAMINOPHEN 1 TABLET: 10; 325 TABLET ORAL at 02:01

## 2021-01-15 RX ADMIN — CLOPIDOGREL 75 MG: 75 TABLET, FILM COATED ORAL at 10:01

## 2021-01-15 RX ADMIN — HYDROCODONE BITARTRATE AND ACETAMINOPHEN 1 TABLET: 10; 325 TABLET ORAL at 12:01

## 2021-01-15 RX ADMIN — POTASSIUM CHLORIDE 10 MEQ: 750 TABLET, EXTENDED RELEASE ORAL at 10:01

## 2021-01-15 RX ADMIN — HYDROCODONE BITARTRATE AND ACETAMINOPHEN 1 TABLET: 10; 325 TABLET ORAL at 06:01

## 2021-01-15 RX ADMIN — LORAZEPAM 0.5 MG: 2 INJECTION INTRAMUSCULAR; INTRAVENOUS at 12:01

## 2021-01-15 RX ADMIN — FLUOXETINE 60 MG: 20 CAPSULE ORAL at 10:01

## 2021-01-15 RX ADMIN — SENNOSIDES 2 TABLET: 8.6 TABLET, FILM COATED ORAL at 10:01

## 2021-01-15 RX ADMIN — LEVOTHYROXINE SODIUM 125 MCG: 75 TABLET ORAL at 06:01

## 2021-01-19 DIAGNOSIS — E78.2 MIXED HYPERLIPIDEMIA: ICD-10-CM

## 2021-01-19 RX ORDER — ATORVASTATIN CALCIUM 80 MG/1
TABLET, FILM COATED ORAL
Qty: 90 TABLET | Refills: 3 | Status: SHIPPED | OUTPATIENT
Start: 2021-01-19 | End: 2021-08-27

## 2021-01-20 ENCOUNTER — DOCUMENT SCAN (OUTPATIENT)
Dept: HOME HEALTH SERVICES | Facility: HOSPITAL | Age: 66
End: 2021-01-20
Payer: MEDICAID

## 2021-01-21 ENCOUNTER — DOCUMENT SCAN (OUTPATIENT)
Dept: HOME HEALTH SERVICES | Facility: HOSPITAL | Age: 66
End: 2021-01-21
Payer: MEDICAID

## 2021-01-22 ENCOUNTER — TELEPHONE (OUTPATIENT)
Dept: UROLOGY | Facility: CLINIC | Age: 66
End: 2021-01-22

## 2021-01-22 DIAGNOSIS — N31.9 NEUROGENIC BLADDER: ICD-10-CM

## 2021-01-22 DIAGNOSIS — N39.0 RECURRENT UTI: Primary | ICD-10-CM

## 2021-01-22 RX ORDER — NITROFURANTOIN 25; 75 MG/1; MG/1
100 CAPSULE ORAL 2 TIMES DAILY
Qty: 14 CAPSULE | Refills: 0 | Status: SHIPPED | OUTPATIENT
Start: 2021-01-22 | End: 2021-01-29

## 2021-01-25 ENCOUNTER — NURSE TRIAGE (OUTPATIENT)
Dept: ADMINISTRATIVE | Facility: CLINIC | Age: 66
End: 2021-01-25

## 2021-01-25 ENCOUNTER — TELEPHONE (OUTPATIENT)
Dept: INTERNAL MEDICINE | Facility: CLINIC | Age: 66
End: 2021-01-25

## 2021-01-26 ENCOUNTER — TELEPHONE (OUTPATIENT)
Dept: UROLOGY | Facility: CLINIC | Age: 66
End: 2021-01-26

## 2021-02-04 ENCOUNTER — PES CALL (OUTPATIENT)
Dept: ADMINISTRATIVE | Facility: CLINIC | Age: 66
End: 2021-02-04

## 2021-02-05 ENCOUNTER — OFFICE VISIT (OUTPATIENT)
Dept: INTERNAL MEDICINE | Facility: CLINIC | Age: 66
End: 2021-02-05
Payer: MEDICARE

## 2021-02-05 VITALS
BODY MASS INDEX: 29.82 KG/M2 | HEIGHT: 65 IN | HEART RATE: 65 BPM | OXYGEN SATURATION: 96 % | SYSTOLIC BLOOD PRESSURE: 90 MMHG | DIASTOLIC BLOOD PRESSURE: 66 MMHG | WEIGHT: 179 LBS

## 2021-02-05 DIAGNOSIS — M79.662 PAIN IN BOTH LOWER LEGS: Primary | ICD-10-CM

## 2021-02-05 DIAGNOSIS — F11.20 NARCOTIC DEPENDENCY, CONTINUOUS: ICD-10-CM

## 2021-02-05 DIAGNOSIS — M79.662 BILATERAL CALF PAIN: ICD-10-CM

## 2021-02-05 DIAGNOSIS — I70.0 THORACIC AORTA ATHEROSCLEROSIS: ICD-10-CM

## 2021-02-05 DIAGNOSIS — M79.661 BILATERAL CALF PAIN: ICD-10-CM

## 2021-02-05 DIAGNOSIS — F33.0 MAJOR DEPRESSIVE DISORDER, RECURRENT, MILD: ICD-10-CM

## 2021-02-05 DIAGNOSIS — I83.12 VARICOSE VEINS OF BOTH LOWER EXTREMITIES WITH INFLAMMATION: ICD-10-CM

## 2021-02-05 DIAGNOSIS — G95.9 CERVICAL MYELOPATHY: ICD-10-CM

## 2021-02-05 DIAGNOSIS — M79.661 PAIN IN BOTH LOWER LEGS: Primary | ICD-10-CM

## 2021-02-05 DIAGNOSIS — M46.96 INFLAMMATORY SPONDYLOPATHY OF LUMBAR REGION: ICD-10-CM

## 2021-02-05 DIAGNOSIS — R25.1 SHUDDERING SPELL: Primary | ICD-10-CM

## 2021-02-05 DIAGNOSIS — I83.811 VARICOSE VEINS OF RIGHT LOWER EXTREMITY WITH PAIN: ICD-10-CM

## 2021-02-05 DIAGNOSIS — I83.11 VARICOSE VEINS OF BOTH LOWER EXTREMITIES WITH INFLAMMATION: ICD-10-CM

## 2021-02-05 DIAGNOSIS — I25.118 CORONARY ARTERY DISEASE OF NATIVE ARTERY OF NATIVE HEART WITH STABLE ANGINA PECTORIS: ICD-10-CM

## 2021-02-05 PROCEDURE — 99214 PR OFFICE/OUTPT VISIT, EST, LEVL IV, 30-39 MIN: ICD-10-PCS | Mod: S$GLB,,, | Performed by: INTERNAL MEDICINE

## 2021-02-05 PROCEDURE — 3008F PR BODY MASS INDEX (BMI) DOCUMENTED: ICD-10-PCS | Mod: CPTII,S$GLB,, | Performed by: INTERNAL MEDICINE

## 2021-02-05 PROCEDURE — 1126F AMNT PAIN NOTED NONE PRSNT: CPT | Mod: S$GLB,,, | Performed by: INTERNAL MEDICINE

## 2021-02-05 PROCEDURE — 99999 PR PBB SHADOW E&M-EST. PATIENT-LVL V: CPT | Mod: PBBFAC,,, | Performed by: INTERNAL MEDICINE

## 2021-02-05 PROCEDURE — 99499 UNLISTED E&M SERVICE: CPT | Mod: S$GLB,,, | Performed by: INTERNAL MEDICINE

## 2021-02-05 PROCEDURE — 99214 OFFICE O/P EST MOD 30 MIN: CPT | Mod: S$GLB,,, | Performed by: INTERNAL MEDICINE

## 2021-02-05 PROCEDURE — 99999 PR PBB SHADOW E&M-EST. PATIENT-LVL V: ICD-10-PCS | Mod: PBBFAC,,, | Performed by: INTERNAL MEDICINE

## 2021-02-05 PROCEDURE — 3008F BODY MASS INDEX DOCD: CPT | Mod: CPTII,S$GLB,, | Performed by: INTERNAL MEDICINE

## 2021-02-05 PROCEDURE — 99499 RISK ADDL DX/OHS AUDIT: ICD-10-PCS | Mod: S$GLB,,, | Performed by: INTERNAL MEDICINE

## 2021-02-05 PROCEDURE — 1126F PR PAIN SEVERITY QUANTIFIED, NO PAIN PRESENT: ICD-10-PCS | Mod: S$GLB,,, | Performed by: INTERNAL MEDICINE

## 2021-02-11 ENCOUNTER — DOCUMENT SCAN (OUTPATIENT)
Dept: HOME HEALTH SERVICES | Facility: HOSPITAL | Age: 66
End: 2021-02-11
Payer: MEDICAID

## 2021-02-19 ENCOUNTER — TELEPHONE (OUTPATIENT)
Dept: NEUROLOGY | Facility: CLINIC | Age: 66
End: 2021-02-19

## 2021-02-22 PROCEDURE — G0179 MD RECERTIFICATION HHA PT: HCPCS | Mod: ,,, | Performed by: UROLOGY

## 2021-02-22 PROCEDURE — G0179 PR HOME HEALTH MD RECERTIFICATION: ICD-10-PCS | Mod: ,,, | Performed by: UROLOGY

## 2021-02-23 ENCOUNTER — EXTERNAL HOME HEALTH (OUTPATIENT)
Dept: HOME HEALTH SERVICES | Facility: HOSPITAL | Age: 66
End: 2021-02-23
Payer: MEDICARE

## 2021-02-23 ENCOUNTER — HOSPITAL ENCOUNTER (OUTPATIENT)
Dept: RADIOLOGY | Facility: HOSPITAL | Age: 66
Discharge: HOME | End: 2021-02-23
Attending: PODIATRIST
Payer: MEDICARE

## 2021-02-23 DIAGNOSIS — M79.661 PAIN IN BOTH LOWER LEGS: ICD-10-CM

## 2021-02-23 DIAGNOSIS — M79.662 BILATERAL CALF PAIN: ICD-10-CM

## 2021-02-23 DIAGNOSIS — I83.12 VARICOSE VEINS OF BOTH LOWER EXTREMITIES WITH INFLAMMATION: ICD-10-CM

## 2021-02-23 DIAGNOSIS — M79.661 BILATERAL CALF PAIN: ICD-10-CM

## 2021-02-23 DIAGNOSIS — I83.811 VARICOSE VEINS OF RIGHT LOWER EXTREMITY WITH PAIN: ICD-10-CM

## 2021-02-23 DIAGNOSIS — M79.662 PAIN IN BOTH LOWER LEGS: ICD-10-CM

## 2021-02-23 DIAGNOSIS — I83.11 VARICOSE VEINS OF BOTH LOWER EXTREMITIES WITH INFLAMMATION: ICD-10-CM

## 2021-02-23 PROCEDURE — 72100 X-RAY EXAM L-S SPINE 2/3 VWS: CPT | Mod: 26,,, | Performed by: SPECIALIST

## 2021-02-23 PROCEDURE — 93970 EXTREMITY STUDY: CPT | Mod: TC

## 2021-02-23 PROCEDURE — 93970 EXTREMITY STUDY: CPT | Mod: 26,,, | Performed by: RADIOLOGY

## 2021-02-23 PROCEDURE — 72100 XR LUMBAR SPINE AP AND LATERAL: ICD-10-PCS | Mod: 26,,, | Performed by: SPECIALIST

## 2021-02-23 PROCEDURE — 72100 X-RAY EXAM L-S SPINE 2/3 VWS: CPT | Mod: TC,FY

## 2021-02-23 PROCEDURE — 93970 US LOWER EXTREMITY VEINS BILATERAL: ICD-10-PCS | Mod: 26,,, | Performed by: RADIOLOGY

## 2021-03-04 ENCOUNTER — TELEPHONE (OUTPATIENT)
Dept: UROLOGY | Facility: CLINIC | Age: 66
End: 2021-03-04

## 2021-03-05 ENCOUNTER — OFFICE VISIT (OUTPATIENT)
Dept: UROLOGY | Facility: CLINIC | Age: 66
End: 2021-03-05
Payer: MEDICARE

## 2021-03-05 VITALS
BODY MASS INDEX: 29.99 KG/M2 | DIASTOLIC BLOOD PRESSURE: 57 MMHG | HEIGHT: 65 IN | HEART RATE: 55 BPM | WEIGHT: 180 LBS | SYSTOLIC BLOOD PRESSURE: 85 MMHG

## 2021-03-05 DIAGNOSIS — Z43.5 ENCOUNTER FOR SUPRAPUBIC CATHETER CARE: ICD-10-CM

## 2021-03-05 DIAGNOSIS — R33.9 URINARY RETENTION: ICD-10-CM

## 2021-03-05 DIAGNOSIS — R39.89 SENSATION OF PRESSURE IN BLADDER AREA: ICD-10-CM

## 2021-03-05 DIAGNOSIS — R39.89 BLADDER PAIN: Primary | ICD-10-CM

## 2021-03-05 DIAGNOSIS — N31.9 NEUROGENIC BLADDER: ICD-10-CM

## 2021-03-05 DIAGNOSIS — N94.9 VAGINAL BURNING: ICD-10-CM

## 2021-03-05 DIAGNOSIS — N89.8 VAGINAL ITCHING: ICD-10-CM

## 2021-03-05 PROCEDURE — 99499 RISK ADDL DX/OHS AUDIT: ICD-10-PCS | Mod: S$GLB,,, | Performed by: PHYSICIAN ASSISTANT

## 2021-03-05 PROCEDURE — 3008F PR BODY MASS INDEX (BMI) DOCUMENTED: ICD-10-PCS | Mod: CPTII,S$GLB,, | Performed by: PHYSICIAN ASSISTANT

## 2021-03-05 PROCEDURE — 87077 CULTURE AEROBIC IDENTIFY: CPT | Performed by: PHYSICIAN ASSISTANT

## 2021-03-05 PROCEDURE — 87186 SC STD MICRODIL/AGAR DIL: CPT | Performed by: PHYSICIAN ASSISTANT

## 2021-03-05 PROCEDURE — 99999 PR PBB SHADOW E&M-EST. PATIENT-LVL IV: ICD-10-PCS | Mod: PBBFAC,,, | Performed by: PHYSICIAN ASSISTANT

## 2021-03-05 PROCEDURE — 87481 CANDIDA DNA AMP PROBE: CPT | Mod: 59 | Performed by: PHYSICIAN ASSISTANT

## 2021-03-05 PROCEDURE — 1125F PR PAIN SEVERITY QUANTIFIED, PAIN PRESENT: ICD-10-PCS | Mod: S$GLB,,, | Performed by: PHYSICIAN ASSISTANT

## 2021-03-05 PROCEDURE — 99499 UNLISTED E&M SERVICE: CPT | Mod: S$GLB,,, | Performed by: PHYSICIAN ASSISTANT

## 2021-03-05 PROCEDURE — 87088 URINE BACTERIA CULTURE: CPT | Performed by: PHYSICIAN ASSISTANT

## 2021-03-05 PROCEDURE — 99214 PR OFFICE/OUTPT VISIT, EST, LEVL IV, 30-39 MIN: ICD-10-PCS | Mod: 25,S$GLB,, | Performed by: PHYSICIAN ASSISTANT

## 2021-03-05 PROCEDURE — 3008F BODY MASS INDEX DOCD: CPT | Mod: CPTII,S$GLB,, | Performed by: PHYSICIAN ASSISTANT

## 2021-03-05 PROCEDURE — 87661 TRICHOMONAS VAGINALIS AMPLIF: CPT | Mod: 59 | Performed by: PHYSICIAN ASSISTANT

## 2021-03-05 PROCEDURE — 51705 PR CHANGE OF BLADDER TUBE,SIMPLE: ICD-10-PCS | Mod: S$GLB,,, | Performed by: PHYSICIAN ASSISTANT

## 2021-03-05 PROCEDURE — 99214 OFFICE O/P EST MOD 30 MIN: CPT | Mod: 25,S$GLB,, | Performed by: PHYSICIAN ASSISTANT

## 2021-03-05 PROCEDURE — 51705 CHANGE OF BLADDER TUBE: CPT | Mod: S$GLB,,, | Performed by: PHYSICIAN ASSISTANT

## 2021-03-05 PROCEDURE — 87086 URINE CULTURE/COLONY COUNT: CPT | Performed by: PHYSICIAN ASSISTANT

## 2021-03-05 PROCEDURE — 1125F AMNT PAIN NOTED PAIN PRSNT: CPT | Mod: S$GLB,,, | Performed by: PHYSICIAN ASSISTANT

## 2021-03-05 PROCEDURE — 99999 PR PBB SHADOW E&M-EST. PATIENT-LVL IV: CPT | Mod: PBBFAC,,, | Performed by: PHYSICIAN ASSISTANT

## 2021-03-08 ENCOUNTER — TELEPHONE (OUTPATIENT)
Dept: UROLOGY | Facility: CLINIC | Age: 66
End: 2021-03-08

## 2021-03-08 ENCOUNTER — TELEPHONE (OUTPATIENT)
Dept: INTERNAL MEDICINE | Facility: CLINIC | Age: 66
End: 2021-03-08

## 2021-03-08 DIAGNOSIS — A49.9 BACTERIAL UTI: Primary | ICD-10-CM

## 2021-03-08 DIAGNOSIS — N39.0 BACTERIAL UTI: Primary | ICD-10-CM

## 2021-03-08 LAB
BACTERIA UR CULT: ABNORMAL
BACTERIAL VAGINOSIS DNA: NEGATIVE
CANDIDA GLABRATA DNA: NEGATIVE
CANDIDA KRUSEI DNA: NEGATIVE
CANDIDA RRNA VAG QL PROBE: NEGATIVE
T VAGINALIS RRNA GENITAL QL PROBE: NEGATIVE

## 2021-03-08 RX ORDER — CIPROFLOXACIN 500 MG/1
500 TABLET ORAL 2 TIMES DAILY
Qty: 14 TABLET | Refills: 0 | Status: SHIPPED | OUTPATIENT
Start: 2021-03-08 | End: 2021-03-15

## 2021-03-30 ENCOUNTER — TELEPHONE (OUTPATIENT)
Dept: UROLOGY | Facility: CLINIC | Age: 66
End: 2021-03-30

## 2021-03-30 ENCOUNTER — OFFICE VISIT (OUTPATIENT)
Dept: PSYCHIATRY | Facility: CLINIC | Age: 66
End: 2021-03-30
Payer: MEDICARE

## 2021-03-30 DIAGNOSIS — F32.A DEPRESSIVE DISORDER: Primary | ICD-10-CM

## 2021-03-30 PROCEDURE — 99214 PR OFFICE/OUTPT VISIT, EST, LEVL IV, 30-39 MIN: ICD-10-PCS | Mod: 95,,, | Performed by: PSYCHIATRY & NEUROLOGY

## 2021-03-30 PROCEDURE — 99214 OFFICE O/P EST MOD 30 MIN: CPT | Mod: 95,,, | Performed by: PSYCHIATRY & NEUROLOGY

## 2021-03-30 RX ORDER — QUETIAPINE FUMARATE 100 MG/1
200 TABLET, FILM COATED ORAL NIGHTLY
Qty: 180 TABLET | Refills: 1 | Status: SHIPPED | OUTPATIENT
Start: 2021-03-30 | End: 2021-03-30 | Stop reason: SDUPTHER

## 2021-03-30 RX ORDER — QUETIAPINE FUMARATE 100 MG/1
200 TABLET, FILM COATED ORAL NIGHTLY
Qty: 180 TABLET | Refills: 1 | Status: SHIPPED | OUTPATIENT
Start: 2021-03-30 | End: 2021-04-22 | Stop reason: SDUPTHER

## 2021-04-02 ENCOUNTER — IMMUNIZATION (OUTPATIENT)
Dept: PRIMARY CARE CLINIC | Facility: CLINIC | Age: 66
End: 2021-04-02
Payer: MEDICARE

## 2021-04-02 DIAGNOSIS — Z23 NEED FOR VACCINATION: Primary | ICD-10-CM

## 2021-04-02 PROCEDURE — 0001A PR IMMUNIZ ADMIN, SARS-COV-2 COVID-19 VACC, 30MCG/0.3ML, 1ST DOSE: ICD-10-PCS | Mod: CV19,S$GLB,, | Performed by: INTERNAL MEDICINE

## 2021-04-02 PROCEDURE — 91300 PR SARS-COV- 2 COVID-19 VACCINE, NO PRSV, 30MCG/0.3ML, IM: CPT | Mod: S$GLB,,, | Performed by: INTERNAL MEDICINE

## 2021-04-02 PROCEDURE — 0001A PR IMMUNIZ ADMIN, SARS-COV-2 COVID-19 VACC, 30MCG/0.3ML, 1ST DOSE: CPT | Mod: CV19,S$GLB,, | Performed by: INTERNAL MEDICINE

## 2021-04-02 PROCEDURE — 91300 PR SARS-COV- 2 COVID-19 VACCINE, NO PRSV, 30MCG/0.3ML, IM: ICD-10-PCS | Mod: S$GLB,,, | Performed by: INTERNAL MEDICINE

## 2021-04-02 RX ADMIN — Medication 0.3 ML: at 12:04

## 2021-04-07 ENCOUNTER — TELEPHONE (OUTPATIENT)
Dept: UROLOGY | Facility: CLINIC | Age: 66
End: 2021-04-07

## 2021-04-13 ENCOUNTER — DOCUMENT SCAN (OUTPATIENT)
Dept: HOME HEALTH SERVICES | Facility: HOSPITAL | Age: 66
End: 2021-04-13
Payer: MEDICAID

## 2021-04-13 ENCOUNTER — TELEPHONE (OUTPATIENT)
Dept: UROLOGY | Facility: CLINIC | Age: 66
End: 2021-04-13

## 2021-04-21 ENCOUNTER — DOCUMENT SCAN (OUTPATIENT)
Dept: HOME HEALTH SERVICES | Facility: HOSPITAL | Age: 66
End: 2021-04-21
Payer: MEDICAID

## 2021-04-22 ENCOUNTER — OFFICE VISIT (OUTPATIENT)
Dept: INTERNAL MEDICINE | Facility: CLINIC | Age: 66
End: 2021-04-22
Payer: MEDICARE

## 2021-04-22 VITALS
HEIGHT: 65 IN | OXYGEN SATURATION: 96 % | HEART RATE: 58 BPM | SYSTOLIC BLOOD PRESSURE: 118 MMHG | DIASTOLIC BLOOD PRESSURE: 60 MMHG | BODY MASS INDEX: 29.95 KG/M2

## 2021-04-22 DIAGNOSIS — G47.01 INSOMNIA DUE TO MEDICAL CONDITION: Primary | Chronic | ICD-10-CM

## 2021-04-22 DIAGNOSIS — G89.4 CHRONIC PAIN SYNDROME: Chronic | ICD-10-CM

## 2021-04-22 DIAGNOSIS — M47.26 OSTEOARTHRITIS OF SPINE WITH RADICULOPATHY, LUMBAR REGION: Chronic | ICD-10-CM

## 2021-04-22 DIAGNOSIS — I89.0 LYMPHEDEMA OF BOTH LOWER EXTREMITIES: Chronic | ICD-10-CM

## 2021-04-22 DIAGNOSIS — N31.9 NEUROGENIC BLADDER: Chronic | ICD-10-CM

## 2021-04-22 DIAGNOSIS — F11.20 NARCOTIC DEPENDENCY, CONTINUOUS: Chronic | ICD-10-CM

## 2021-04-22 PROCEDURE — 1125F AMNT PAIN NOTED PAIN PRSNT: CPT | Mod: S$GLB,,, | Performed by: INTERNAL MEDICINE

## 2021-04-22 PROCEDURE — 3008F PR BODY MASS INDEX (BMI) DOCUMENTED: ICD-10-PCS | Mod: CPTII,S$GLB,, | Performed by: INTERNAL MEDICINE

## 2021-04-22 PROCEDURE — 1125F PR PAIN SEVERITY QUANTIFIED, PAIN PRESENT: ICD-10-PCS | Mod: S$GLB,,, | Performed by: INTERNAL MEDICINE

## 2021-04-22 PROCEDURE — 99999 PR PBB SHADOW E&M-EST. PATIENT-LVL IV: CPT | Mod: PBBFAC,,, | Performed by: INTERNAL MEDICINE

## 2021-04-22 PROCEDURE — 99214 OFFICE O/P EST MOD 30 MIN: CPT | Mod: S$GLB,,, | Performed by: INTERNAL MEDICINE

## 2021-04-22 PROCEDURE — 3008F BODY MASS INDEX DOCD: CPT | Mod: CPTII,S$GLB,, | Performed by: INTERNAL MEDICINE

## 2021-04-22 PROCEDURE — 99999 PR PBB SHADOW E&M-EST. PATIENT-LVL IV: ICD-10-PCS | Mod: PBBFAC,,, | Performed by: INTERNAL MEDICINE

## 2021-04-22 PROCEDURE — 99214 PR OFFICE/OUTPT VISIT, EST, LEVL IV, 30-39 MIN: ICD-10-PCS | Mod: S$GLB,,, | Performed by: INTERNAL MEDICINE

## 2021-04-22 RX ORDER — CARISOPRODOL 350 MG/1
350 TABLET ORAL 4 TIMES DAILY PRN
COMMUNITY
Start: 2021-03-11 | End: 2021-11-15 | Stop reason: CLARIF

## 2021-04-22 RX ORDER — TORSEMIDE 100 MG/1
100 TABLET ORAL DAILY
Qty: 90 TABLET | Refills: 3 | Status: SHIPPED | OUTPATIENT
Start: 2021-04-22 | End: 2021-07-27 | Stop reason: SDUPTHER

## 2021-04-22 RX ORDER — QUETIAPINE FUMARATE 100 MG/1
200 TABLET, FILM COATED ORAL NIGHTLY
Qty: 180 TABLET | Refills: 3 | Status: SHIPPED | OUTPATIENT
Start: 2021-04-22 | End: 2021-09-23 | Stop reason: SDUPTHER

## 2021-04-23 ENCOUNTER — IMMUNIZATION (OUTPATIENT)
Dept: PRIMARY CARE CLINIC | Facility: CLINIC | Age: 66
End: 2021-04-23

## 2021-04-23 DIAGNOSIS — Z23 NEED FOR VACCINATION: Primary | ICD-10-CM

## 2021-04-23 PROCEDURE — G0180 MD CERTIFICATION HHA PATIENT: HCPCS | Mod: ,,, | Performed by: UROLOGY

## 2021-04-23 PROCEDURE — 0002A PR IMMUNIZ ADMIN, SARS-COV-2 COVID-19 VACC, 30MCG/0.3ML, 2ND DOSE: CPT | Mod: CV19,S$GLB,, | Performed by: INTERNAL MEDICINE

## 2021-04-23 PROCEDURE — 0002A PR IMMUNIZ ADMIN, SARS-COV-2 COVID-19 VACC, 30MCG/0.3ML, 2ND DOSE: ICD-10-PCS | Mod: CV19,S$GLB,, | Performed by: INTERNAL MEDICINE

## 2021-04-23 PROCEDURE — G0180 PR HOME HEALTH MD CERTIFICATION: ICD-10-PCS | Mod: ,,, | Performed by: UROLOGY

## 2021-04-23 PROCEDURE — 91300 PR SARS-COV- 2 COVID-19 VACCINE, NO PRSV, 30MCG/0.3ML, IM: CPT | Mod: S$GLB,,, | Performed by: INTERNAL MEDICINE

## 2021-04-23 PROCEDURE — 91300 PR SARS-COV- 2 COVID-19 VACCINE, NO PRSV, 30MCG/0.3ML, IM: ICD-10-PCS | Mod: S$GLB,,, | Performed by: INTERNAL MEDICINE

## 2021-04-23 RX ADMIN — Medication 0.3 ML: at 10:04

## 2021-04-27 ENCOUNTER — TELEPHONE (OUTPATIENT)
Dept: UROLOGY | Facility: CLINIC | Age: 66
End: 2021-04-27

## 2021-04-29 ENCOUNTER — EXTERNAL HOME HEALTH (OUTPATIENT)
Dept: HOME HEALTH SERVICES | Facility: HOSPITAL | Age: 66
End: 2021-04-29
Payer: MEDICARE

## 2021-05-03 ENCOUNTER — TELEPHONE (OUTPATIENT)
Dept: UROLOGY | Facility: CLINIC | Age: 66
End: 2021-05-03

## 2021-05-03 DIAGNOSIS — N39.0 BACTERIAL UTI: Primary | ICD-10-CM

## 2021-05-03 DIAGNOSIS — A49.9 BACTERIAL UTI: Primary | ICD-10-CM

## 2021-05-03 RX ORDER — DOXYCYCLINE 100 MG/1
100 CAPSULE ORAL 2 TIMES DAILY
Qty: 14 CAPSULE | Refills: 0 | Status: SHIPPED | OUTPATIENT
Start: 2021-05-03 | End: 2021-05-10

## 2021-05-06 ENCOUNTER — DOCUMENT SCAN (OUTPATIENT)
Dept: HOME HEALTH SERVICES | Facility: HOSPITAL | Age: 66
End: 2021-05-06
Payer: MEDICAID

## 2021-05-07 ENCOUNTER — DOCUMENT SCAN (OUTPATIENT)
Dept: HOME HEALTH SERVICES | Facility: HOSPITAL | Age: 66
End: 2021-05-07
Payer: MEDICAID

## 2021-05-08 ENCOUNTER — HOSPITAL ENCOUNTER (EMERGENCY)
Facility: HOSPITAL | Age: 66
Discharge: HOME | End: 2021-05-09
Attending: EMERGENCY MEDICINE
Payer: MEDICARE

## 2021-05-08 DIAGNOSIS — E87.6 HYPOKALEMIA: ICD-10-CM

## 2021-05-08 DIAGNOSIS — G89.29 OTHER CHRONIC PAIN: ICD-10-CM

## 2021-05-08 DIAGNOSIS — M62.838 MUSCLE SPASM: ICD-10-CM

## 2021-05-08 DIAGNOSIS — R19.7 DIARRHEA, UNSPECIFIED TYPE: ICD-10-CM

## 2021-05-08 DIAGNOSIS — R10.32 BILATERAL LOWER ABDOMINAL DISCOMFORT: ICD-10-CM

## 2021-05-08 DIAGNOSIS — R10.31 BILATERAL LOWER ABDOMINAL DISCOMFORT: ICD-10-CM

## 2021-05-08 LAB
ALBUMIN SERPL BCP-MCNC: 3.5 G/DL (ref 3.5–5.2)
ALP SERPL-CCNC: 128 U/L (ref 55–135)
ALT SERPL W/O P-5'-P-CCNC: 9 U/L (ref 10–44)
AMPHET+METHAMPHET UR QL: NEGATIVE
ANION GAP SERPL CALC-SCNC: 13 MMOL/L (ref 8–16)
AST SERPL-CCNC: 14 U/L (ref 10–40)
BARBITURATES UR QL SCN>200 NG/ML: NORMAL
BASOPHILS # BLD AUTO: 0.02 K/UL (ref 0–0.2)
BASOPHILS NFR BLD: 0.4 % (ref 0–1.9)
BENZODIAZ UR QL SCN>200 NG/ML: NEGATIVE
BILIRUB SERPL-MCNC: 0.5 MG/DL (ref 0.1–1)
BILIRUB UR QL STRIP: NEGATIVE
BUN SERPL-MCNC: 23 MG/DL (ref 8–23)
BZE UR QL SCN: NEGATIVE
CALCIUM SERPL-MCNC: 8.6 MG/DL (ref 8.7–10.5)
CANNABINOIDS UR QL SCN: NEGATIVE
CHLORIDE SERPL-SCNC: 95 MMOL/L (ref 95–110)
CLARITY UR: CLEAR
CO2 SERPL-SCNC: 31 MMOL/L (ref 23–29)
COLOR UR: YELLOW
CREAT SERPL-MCNC: 1.4 MG/DL (ref 0.5–1.4)
CREAT UR-MCNC: 46.5 MG/DL (ref 15–325)
DIFFERENTIAL METHOD: ABNORMAL
EOSINOPHIL # BLD AUTO: 0.2 K/UL (ref 0–0.5)
EOSINOPHIL NFR BLD: 3.5 % (ref 0–8)
ERYTHROCYTE [DISTWIDTH] IN BLOOD BY AUTOMATED COUNT: 13.5 % (ref 11.5–14.5)
EST. GFR  (AFRICAN AMERICAN): 46 ML/MIN/1.73 M^2
EST. GFR  (NON AFRICAN AMERICAN): 40 ML/MIN/1.73 M^2
GLUCOSE SERPL-MCNC: 115 MG/DL (ref 70–110)
GLUCOSE UR QL STRIP: NEGATIVE
HCT VFR BLD AUTO: 36.1 % (ref 37–48.5)
HGB BLD-MCNC: 11.7 G/DL (ref 12–16)
HGB UR QL STRIP: ABNORMAL
IMM GRANULOCYTES # BLD AUTO: 0.02 K/UL (ref 0–0.04)
IMM GRANULOCYTES NFR BLD AUTO: 0.4 % (ref 0–0.5)
KETONES UR QL STRIP: NEGATIVE
LEUKOCYTE ESTERASE UR QL STRIP: ABNORMAL
LYMPHOCYTES # BLD AUTO: 1.8 K/UL (ref 1–4.8)
LYMPHOCYTES NFR BLD: 32.4 % (ref 18–48)
MAGNESIUM SERPL-MCNC: 1.5 MG/DL (ref 1.6–2.6)
MCH RBC QN AUTO: 28.2 PG (ref 27–31)
MCHC RBC AUTO-ENTMCNC: 32.4 G/DL (ref 32–36)
MCV RBC AUTO: 87 FL (ref 82–98)
METHADONE UR QL SCN>300 NG/ML: NEGATIVE
MICROSCOPIC COMMENT: NORMAL
MONOCYTES # BLD AUTO: 0.6 K/UL (ref 0.3–1)
MONOCYTES NFR BLD: 10.8 % (ref 4–15)
NEUTROPHILS # BLD AUTO: 3 K/UL (ref 1.8–7.7)
NEUTROPHILS NFR BLD: 52.5 % (ref 38–73)
NITRITE UR QL STRIP: NEGATIVE
NRBC BLD-RTO: 0 /100 WBC
OPIATES UR QL SCN: NORMAL
PCP UR QL SCN>25 NG/ML: NEGATIVE
PH UR STRIP: 6 [PH] (ref 5–8)
PLATELET # BLD AUTO: 211 K/UL (ref 150–450)
PMV BLD AUTO: 10.4 FL (ref 9.2–12.9)
POTASSIUM SERPL-SCNC: 3 MMOL/L (ref 3.5–5.1)
PROT SERPL-MCNC: 6.9 G/DL (ref 6–8.4)
PROT UR QL STRIP: NEGATIVE
RBC # BLD AUTO: 4.15 M/UL (ref 4–5.4)
RBC #/AREA URNS HPF: 3 /HPF (ref 0–4)
SODIUM SERPL-SCNC: 139 MMOL/L (ref 136–145)
SP GR UR STRIP: 1 (ref 1–1.03)
TOXICOLOGY INFORMATION: NORMAL
URN SPEC COLLECT METH UR: ABNORMAL
UROBILINOGEN UR STRIP-ACNC: NEGATIVE EU/DL
WBC # BLD AUTO: 5.64 K/UL (ref 3.9–12.7)
WBC #/AREA URNS HPF: 2 /HPF (ref 0–5)

## 2021-05-08 PROCEDURE — 96375 TX/PRO/DX INJ NEW DRUG ADDON: CPT

## 2021-05-08 PROCEDURE — 85025 COMPLETE CBC W/AUTO DIFF WBC: CPT | Performed by: EMERGENCY MEDICINE

## 2021-05-08 PROCEDURE — 81000 URINALYSIS NONAUTO W/SCOPE: CPT | Mod: 59 | Performed by: EMERGENCY MEDICINE

## 2021-05-08 PROCEDURE — 96374 THER/PROPH/DIAG INJ IV PUSH: CPT

## 2021-05-08 PROCEDURE — 99284 EMERGENCY DEPT VISIT MOD MDM: CPT | Mod: 25

## 2021-05-08 PROCEDURE — 25000003 PHARM REV CODE 250: Performed by: EMERGENCY MEDICINE

## 2021-05-08 PROCEDURE — 96372 THER/PROPH/DIAG INJ SC/IM: CPT

## 2021-05-08 PROCEDURE — 80307 DRUG TEST PRSMV CHEM ANLYZR: CPT | Performed by: EMERGENCY MEDICINE

## 2021-05-08 PROCEDURE — 63600175 PHARM REV CODE 636 W HCPCS: Performed by: EMERGENCY MEDICINE

## 2021-05-08 PROCEDURE — 80053 COMPREHEN METABOLIC PANEL: CPT | Performed by: EMERGENCY MEDICINE

## 2021-05-08 PROCEDURE — 83735 ASSAY OF MAGNESIUM: CPT | Performed by: EMERGENCY MEDICINE

## 2021-05-08 RX ORDER — BENZTROPINE MESYLATE 1 MG/ML
1 INJECTION, SOLUTION INTRAMUSCULAR; INTRAVENOUS ONCE
Status: COMPLETED | OUTPATIENT
Start: 2021-05-08 | End: 2021-05-08

## 2021-05-08 RX ORDER — BENZTROPINE MESYLATE 1 MG/ML
1 INJECTION, SOLUTION INTRAMUSCULAR; INTRAVENOUS
Status: DISCONTINUED | OUTPATIENT
Start: 2021-05-08 | End: 2021-05-08

## 2021-05-08 RX ORDER — DIPHENHYDRAMINE HYDROCHLORIDE 50 MG/ML
25 INJECTION INTRAMUSCULAR; INTRAVENOUS
Status: COMPLETED | OUTPATIENT
Start: 2021-05-08 | End: 2021-05-08

## 2021-05-08 RX ORDER — POTASSIUM CHLORIDE 20 MEQ/1
20 TABLET, EXTENDED RELEASE ORAL
Status: COMPLETED | OUTPATIENT
Start: 2021-05-08 | End: 2021-05-08

## 2021-05-08 RX ORDER — HYDROMORPHONE HYDROCHLORIDE 1 MG/ML
1 INJECTION, SOLUTION INTRAMUSCULAR; INTRAVENOUS; SUBCUTANEOUS
Status: COMPLETED | OUTPATIENT
Start: 2021-05-09 | End: 2021-05-08

## 2021-05-08 RX ADMIN — HYDROMORPHONE HYDROCHLORIDE 1 MG: 1 INJECTION, SOLUTION INTRAMUSCULAR; INTRAVENOUS; SUBCUTANEOUS at 11:05

## 2021-05-08 RX ADMIN — POTASSIUM CHLORIDE 20 MEQ: 1500 TABLET, EXTENDED RELEASE ORAL at 07:05

## 2021-05-08 RX ADMIN — DIPHENHYDRAMINE HYDROCHLORIDE 25 MG: 50 INJECTION, SOLUTION INTRAMUSCULAR; INTRAVENOUS at 09:05

## 2021-05-08 RX ADMIN — BENZTROPINE MESYLATE 1 MG: 1 INJECTION INTRAMUSCULAR; INTRAVENOUS at 07:05

## 2021-05-09 VITALS
HEIGHT: 65 IN | RESPIRATION RATE: 20 BRPM | OXYGEN SATURATION: 95 % | TEMPERATURE: 98 F | WEIGHT: 175 LBS | HEART RATE: 46 BPM | SYSTOLIC BLOOD PRESSURE: 99 MMHG | BODY MASS INDEX: 29.16 KG/M2 | DIASTOLIC BLOOD PRESSURE: 55 MMHG

## 2021-05-09 PROCEDURE — 63600175 PHARM REV CODE 636 W HCPCS: Performed by: EMERGENCY MEDICINE

## 2021-05-12 ENCOUNTER — TELEPHONE (OUTPATIENT)
Dept: UROLOGY | Facility: CLINIC | Age: 66
End: 2021-05-12

## 2021-05-18 ENCOUNTER — TELEPHONE (OUTPATIENT)
Dept: UROLOGY | Facility: CLINIC | Age: 66
End: 2021-05-18

## 2021-05-18 ENCOUNTER — TELEPHONE (OUTPATIENT)
Dept: INTERNAL MEDICINE | Facility: CLINIC | Age: 66
End: 2021-05-18

## 2021-05-18 DIAGNOSIS — N39.46 MIXED STRESS AND URGE URINARY INCONTINENCE: ICD-10-CM

## 2021-05-18 DIAGNOSIS — N31.9 NEUROGENIC BLADDER: ICD-10-CM

## 2021-05-18 DIAGNOSIS — Z43.5 ENCOUNTER FOR SUPRAPUBIC CATHETER CARE: Primary | ICD-10-CM

## 2021-05-19 ENCOUNTER — TELEPHONE (OUTPATIENT)
Dept: CARDIOLOGY | Facility: CLINIC | Age: 66
End: 2021-05-19

## 2021-05-19 ENCOUNTER — HOSPITAL ENCOUNTER (EMERGENCY)
Facility: HOSPITAL | Age: 66
Discharge: HOME | End: 2021-05-19
Attending: EMERGENCY MEDICINE
Payer: MEDICARE

## 2021-05-19 ENCOUNTER — OFFICE VISIT (OUTPATIENT)
Dept: PSYCHIATRY | Facility: CLINIC | Age: 66
End: 2021-05-19
Payer: MEDICARE

## 2021-05-19 VITALS
RESPIRATION RATE: 20 BRPM | WEIGHT: 198 LBS | DIASTOLIC BLOOD PRESSURE: 60 MMHG | OXYGEN SATURATION: 98 % | SYSTOLIC BLOOD PRESSURE: 120 MMHG | TEMPERATURE: 98 F | BODY MASS INDEX: 32.99 KG/M2 | HEIGHT: 65 IN | HEART RATE: 82 BPM

## 2021-05-19 VITALS
HEART RATE: 67 BPM | BODY MASS INDEX: 29.97 KG/M2 | WEIGHT: 197.75 LBS | DIASTOLIC BLOOD PRESSURE: 52 MMHG | HEIGHT: 68 IN | SYSTOLIC BLOOD PRESSURE: 97 MMHG

## 2021-05-19 DIAGNOSIS — F41.9 ANXIETY: ICD-10-CM

## 2021-05-19 DIAGNOSIS — M54.50 ACUTE BILATERAL LOW BACK PAIN WITHOUT SCIATICA: ICD-10-CM

## 2021-05-19 DIAGNOSIS — M79.89 LEG SWELLING: Primary | ICD-10-CM

## 2021-05-19 DIAGNOSIS — G47.00 INSOMNIA, UNSPECIFIED TYPE: ICD-10-CM

## 2021-05-19 DIAGNOSIS — R06.02 SHORTNESS OF BREATH: ICD-10-CM

## 2021-05-19 LAB
ALBUMIN SERPL BCP-MCNC: 3.5 G/DL (ref 3.5–5.2)
ALP SERPL-CCNC: 127 U/L (ref 55–135)
ALT SERPL W/O P-5'-P-CCNC: 5 U/L (ref 10–44)
ANION GAP SERPL CALC-SCNC: 11 MMOL/L (ref 8–16)
AST SERPL-CCNC: 12 U/L (ref 10–40)
BASOPHILS # BLD AUTO: 0.01 K/UL (ref 0–0.2)
BASOPHILS NFR BLD: 0.1 % (ref 0–1.9)
BILIRUB SERPL-MCNC: 0.4 MG/DL (ref 0.1–1)
BNP SERPL-MCNC: 24 PG/ML (ref 0–99)
BUN SERPL-MCNC: 11 MG/DL (ref 8–23)
CALCIUM SERPL-MCNC: 8.6 MG/DL (ref 8.7–10.5)
CHLORIDE SERPL-SCNC: 101 MMOL/L (ref 95–110)
CO2 SERPL-SCNC: 25 MMOL/L (ref 23–29)
CREAT SERPL-MCNC: 1.2 MG/DL (ref 0.5–1.4)
DIFFERENTIAL METHOD: ABNORMAL
EOSINOPHIL # BLD AUTO: 0.2 K/UL (ref 0–0.5)
EOSINOPHIL NFR BLD: 1.9 % (ref 0–8)
ERYTHROCYTE [DISTWIDTH] IN BLOOD BY AUTOMATED COUNT: 14.2 % (ref 11.5–14.5)
EST. GFR  (AFRICAN AMERICAN): 55 ML/MIN/1.73 M^2
EST. GFR  (NON AFRICAN AMERICAN): 48 ML/MIN/1.73 M^2
GLUCOSE SERPL-MCNC: 103 MG/DL (ref 70–110)
HCT VFR BLD AUTO: 30.8 % (ref 37–48.5)
HGB BLD-MCNC: 9.8 G/DL (ref 12–16)
IMM GRANULOCYTES # BLD AUTO: 0.04 K/UL (ref 0–0.04)
IMM GRANULOCYTES NFR BLD AUTO: 0.4 % (ref 0–0.5)
LYMPHOCYTES # BLD AUTO: 0.9 K/UL (ref 1–4.8)
LYMPHOCYTES NFR BLD: 9.1 % (ref 18–48)
MCH RBC QN AUTO: 27.9 PG (ref 27–31)
MCHC RBC AUTO-ENTMCNC: 31.8 G/DL (ref 32–36)
MCV RBC AUTO: 88 FL (ref 82–98)
MONOCYTES # BLD AUTO: 0.7 K/UL (ref 0.3–1)
MONOCYTES NFR BLD: 7.1 % (ref 4–15)
NEUTROPHILS # BLD AUTO: 8.1 K/UL (ref 1.8–7.7)
NEUTROPHILS NFR BLD: 81.4 % (ref 38–73)
NRBC BLD-RTO: 0 /100 WBC
PLATELET # BLD AUTO: 172 K/UL (ref 150–450)
PMV BLD AUTO: 10.7 FL (ref 9.2–12.9)
POTASSIUM SERPL-SCNC: 3.8 MMOL/L (ref 3.5–5.1)
PROT SERPL-MCNC: 6.8 G/DL (ref 6–8.4)
RBC # BLD AUTO: 3.51 M/UL (ref 4–5.4)
SODIUM SERPL-SCNC: 137 MMOL/L (ref 136–145)
TROPONIN I SERPL DL<=0.01 NG/ML-MCNC: 0.01 NG/ML (ref 0–0.03)
WBC # BLD AUTO: 9.9 K/UL (ref 3.9–12.7)

## 2021-05-19 PROCEDURE — 99214 OFFICE O/P EST MOD 30 MIN: CPT | Mod: S$GLB,,, | Performed by: PSYCHIATRY & NEUROLOGY

## 2021-05-19 PROCEDURE — 3008F PR BODY MASS INDEX (BMI) DOCUMENTED: ICD-10-PCS | Mod: CPTII,S$GLB,, | Performed by: PSYCHIATRY & NEUROLOGY

## 2021-05-19 PROCEDURE — 93005 ELECTROCARDIOGRAM TRACING: CPT

## 2021-05-19 PROCEDURE — 63600175 PHARM REV CODE 636 W HCPCS: Performed by: EMERGENCY MEDICINE

## 2021-05-19 PROCEDURE — 99999 PR PBB SHADOW E&M-EST. PATIENT-LVL II: ICD-10-PCS | Mod: PBBFAC,,, | Performed by: PSYCHIATRY & NEUROLOGY

## 2021-05-19 PROCEDURE — 3008F BODY MASS INDEX DOCD: CPT | Mod: CPTII,S$GLB,, | Performed by: PSYCHIATRY & NEUROLOGY

## 2021-05-19 PROCEDURE — 83880 ASSAY OF NATRIURETIC PEPTIDE: CPT | Performed by: EMERGENCY MEDICINE

## 2021-05-19 PROCEDURE — 25000003 PHARM REV CODE 250: Performed by: EMERGENCY MEDICINE

## 2021-05-19 PROCEDURE — 99499 RISK ADDL DX/OHS AUDIT: ICD-10-PCS | Mod: S$GLB,,, | Performed by: PSYCHIATRY & NEUROLOGY

## 2021-05-19 PROCEDURE — 85025 COMPLETE CBC W/AUTO DIFF WBC: CPT | Performed by: EMERGENCY MEDICINE

## 2021-05-19 PROCEDURE — 99999 PR PBB SHADOW E&M-EST. PATIENT-LVL II: CPT | Mod: PBBFAC,,, | Performed by: PSYCHIATRY & NEUROLOGY

## 2021-05-19 PROCEDURE — 96374 THER/PROPH/DIAG INJ IV PUSH: CPT

## 2021-05-19 PROCEDURE — 96375 TX/PRO/DX INJ NEW DRUG ADDON: CPT

## 2021-05-19 PROCEDURE — 99499 UNLISTED E&M SERVICE: CPT | Mod: S$GLB,,, | Performed by: PSYCHIATRY & NEUROLOGY

## 2021-05-19 PROCEDURE — 99285 EMERGENCY DEPT VISIT HI MDM: CPT | Mod: 25

## 2021-05-19 PROCEDURE — 84484 ASSAY OF TROPONIN QUANT: CPT | Performed by: EMERGENCY MEDICINE

## 2021-05-19 PROCEDURE — 93010 ELECTROCARDIOGRAM REPORT: CPT | Mod: ,,, | Performed by: INTERNAL MEDICINE

## 2021-05-19 PROCEDURE — 93010 EKG 12-LEAD: ICD-10-PCS | Mod: ,,, | Performed by: INTERNAL MEDICINE

## 2021-05-19 PROCEDURE — 80053 COMPREHEN METABOLIC PANEL: CPT | Performed by: EMERGENCY MEDICINE

## 2021-05-19 PROCEDURE — 99214 PR OFFICE/OUTPT VISIT, EST, LEVL IV, 30-39 MIN: ICD-10-PCS | Mod: S$GLB,,, | Performed by: PSYCHIATRY & NEUROLOGY

## 2021-05-19 RX ORDER — QUETIAPINE FUMARATE 25 MG/1
TABLET, FILM COATED ORAL
Qty: 180 TABLET | Refills: 1 | Status: SHIPPED | OUTPATIENT
Start: 2021-05-19 | End: 2021-09-23 | Stop reason: SDUPTHER

## 2021-05-19 RX ORDER — OLANZAPINE 5 MG/1
TABLET ORAL
Qty: 5 TABLET | Refills: 0 | Status: SHIPPED | OUTPATIENT
Start: 2021-05-19 | End: 2021-07-27

## 2021-05-19 RX ORDER — FUROSEMIDE 10 MG/ML
80 INJECTION INTRAMUSCULAR; INTRAVENOUS
Status: COMPLETED | OUTPATIENT
Start: 2021-05-19 | End: 2021-05-19

## 2021-05-19 RX ORDER — FLUOXETINE HYDROCHLORIDE 20 MG/1
60 CAPSULE ORAL DAILY
Qty: 270 CAPSULE | Refills: 1 | Status: SHIPPED | OUTPATIENT
Start: 2021-05-19 | End: 2021-09-23 | Stop reason: SDUPTHER

## 2021-05-19 RX ORDER — TRAZODONE HYDROCHLORIDE 100 MG/1
300 TABLET ORAL NIGHTLY
Qty: 270 TABLET | Refills: 1 | Status: SHIPPED | OUTPATIENT
Start: 2021-05-19 | End: 2021-09-23 | Stop reason: SDUPTHER

## 2021-05-19 RX ORDER — HALOPERIDOL 5 MG/ML
5 INJECTION INTRAMUSCULAR
Status: COMPLETED | OUTPATIENT
Start: 2021-05-19 | End: 2021-05-19

## 2021-05-19 RX ORDER — HYDROMORPHONE HYDROCHLORIDE 1 MG/ML
0.5 INJECTION, SOLUTION INTRAMUSCULAR; INTRAVENOUS; SUBCUTANEOUS
Status: COMPLETED | OUTPATIENT
Start: 2021-05-19 | End: 2021-05-19

## 2021-05-19 RX ORDER — ASPIRIN 325 MG
325 TABLET ORAL
Status: COMPLETED | OUTPATIENT
Start: 2021-05-19 | End: 2021-05-19

## 2021-05-19 RX ADMIN — ASPIRIN 325 MG ORAL TABLET 325 MG: 325 PILL ORAL at 06:05

## 2021-05-19 RX ADMIN — FUROSEMIDE 80 MG: 10 INJECTION, SOLUTION INTRAVENOUS at 07:05

## 2021-05-19 RX ADMIN — HYDROMORPHONE HYDROCHLORIDE 0.5 MG: 1 INJECTION, SOLUTION INTRAMUSCULAR; INTRAVENOUS; SUBCUTANEOUS at 08:05

## 2021-05-19 RX ADMIN — HALOPERIDOL LACTATE 5 MG: 5 INJECTION, SOLUTION INTRAMUSCULAR at 07:05

## 2021-05-25 ENCOUNTER — DOCUMENT SCAN (OUTPATIENT)
Dept: HOME HEALTH SERVICES | Facility: HOSPITAL | Age: 66
End: 2021-05-25
Payer: MEDICAID

## 2021-05-25 ENCOUNTER — DOCUMENT SCAN (OUTPATIENT)
Dept: HOME HEALTH SERVICES | Facility: HOSPITAL | Age: 66
End: 2021-05-25

## 2021-05-27 ENCOUNTER — TELEPHONE (OUTPATIENT)
Dept: INTERNAL MEDICINE | Facility: CLINIC | Age: 66
End: 2021-05-27

## 2021-06-02 ENCOUNTER — NURSE TRIAGE (OUTPATIENT)
Dept: ADMINISTRATIVE | Facility: CLINIC | Age: 66
End: 2021-06-02

## 2021-06-02 ENCOUNTER — HOSPITAL ENCOUNTER (EMERGENCY)
Facility: HOSPITAL | Age: 66
Discharge: OTHER INSTITUTION | End: 2021-06-03
Attending: EMERGENCY MEDICINE | Admitting: FAMILY MEDICINE
Payer: MEDICARE

## 2021-06-02 ENCOUNTER — OFFICE VISIT (OUTPATIENT)
Dept: URGENT CARE | Facility: CLINIC | Age: 66
End: 2021-06-02
Payer: MEDICARE

## 2021-06-02 VITALS
OXYGEN SATURATION: 88 % | TEMPERATURE: 99 F | DIASTOLIC BLOOD PRESSURE: 69 MMHG | HEART RATE: 71 BPM | RESPIRATION RATE: 18 BRPM | WEIGHT: 198 LBS | SYSTOLIC BLOOD PRESSURE: 104 MMHG | HEIGHT: 65 IN | BODY MASS INDEX: 32.99 KG/M2

## 2021-06-02 DIAGNOSIS — R79.81 ABNORMAL PULSE OXIMETRY: ICD-10-CM

## 2021-06-02 DIAGNOSIS — R06.02 SOB (SHORTNESS OF BREATH): Primary | ICD-10-CM

## 2021-06-02 DIAGNOSIS — R00.1 BRADYCARDIA: ICD-10-CM

## 2021-06-02 DIAGNOSIS — R06.02 SOB (SHORTNESS OF BREATH): ICD-10-CM

## 2021-06-02 DIAGNOSIS — J18.9 PNEUMONIA OF LEFT LUNG DUE TO INFECTIOUS ORGANISM, UNSPECIFIED PART OF LUNG: ICD-10-CM

## 2021-06-02 DIAGNOSIS — J18.9 CAP (COMMUNITY ACQUIRED PNEUMONIA): ICD-10-CM

## 2021-06-02 DIAGNOSIS — R07.9 CHEST PAIN: ICD-10-CM

## 2021-06-02 LAB
ALBUMIN SERPL BCP-MCNC: 3.1 G/DL (ref 3.5–5.2)
ALP SERPL-CCNC: 163 U/L (ref 55–135)
ALT SERPL W/O P-5'-P-CCNC: 8 U/L (ref 10–44)
ANION GAP SERPL CALC-SCNC: 15 MMOL/L (ref 8–16)
AST SERPL-CCNC: 18 U/L (ref 10–40)
BACTERIA #/AREA URNS HPF: ABNORMAL /HPF
BASOPHILS # BLD AUTO: 0.01 K/UL (ref 0–0.2)
BASOPHILS NFR BLD: 0.1 % (ref 0–1.9)
BILIRUB SERPL-MCNC: 0.5 MG/DL (ref 0.1–1)
BILIRUB UR QL STRIP: NEGATIVE
BNP SERPL-MCNC: 61 PG/ML (ref 0–99)
BUN SERPL-MCNC: 8 MG/DL (ref 8–23)
CALCIUM SERPL-MCNC: 9.3 MG/DL (ref 8.7–10.5)
CHLORIDE SERPL-SCNC: 90 MMOL/L (ref 95–110)
CLARITY UR: CLEAR
CO2 SERPL-SCNC: 28 MMOL/L (ref 23–29)
COLOR UR: YELLOW
CREAT SERPL-MCNC: 0.9 MG/DL (ref 0.5–1.4)
CTP QC/QA: YES
DIFFERENTIAL METHOD: ABNORMAL
EOSINOPHIL # BLD AUTO: 0.1 K/UL (ref 0–0.5)
EOSINOPHIL NFR BLD: 1.2 % (ref 0–8)
ERYTHROCYTE [DISTWIDTH] IN BLOOD BY AUTOMATED COUNT: 12.9 % (ref 11.5–14.5)
EST. GFR  (AFRICAN AMERICAN): >60 ML/MIN/1.73 M^2
EST. GFR  (NON AFRICAN AMERICAN): >60 ML/MIN/1.73 M^2
GLUCOSE SERPL-MCNC: 89 MG/DL (ref 70–110)
GLUCOSE UR QL STRIP: NEGATIVE
HCT VFR BLD AUTO: 31 % (ref 37–48.5)
HGB BLD-MCNC: 10.2 G/DL (ref 12–16)
HGB UR QL STRIP: ABNORMAL
IMM GRANULOCYTES # BLD AUTO: 0.03 K/UL (ref 0–0.04)
IMM GRANULOCYTES NFR BLD AUTO: 0.4 % (ref 0–0.5)
KETONES UR QL STRIP: NEGATIVE
LACTATE SERPL-SCNC: 1.1 MMOL/L (ref 0.5–2.2)
LEUKOCYTE ESTERASE UR QL STRIP: ABNORMAL
LYMPHOCYTES # BLD AUTO: 1 K/UL (ref 1–4.8)
LYMPHOCYTES NFR BLD: 11.8 % (ref 18–48)
MCH RBC QN AUTO: 28.4 PG (ref 27–31)
MCHC RBC AUTO-ENTMCNC: 32.9 G/DL (ref 32–36)
MCV RBC AUTO: 86 FL (ref 82–98)
MICROSCOPIC COMMENT: ABNORMAL
MONOCYTES # BLD AUTO: 0.7 K/UL (ref 0.3–1)
MONOCYTES NFR BLD: 7.6 % (ref 4–15)
NEUTROPHILS # BLD AUTO: 6.7 K/UL (ref 1.8–7.7)
NEUTROPHILS NFR BLD: 78.9 % (ref 38–73)
NITRITE UR QL STRIP: NEGATIVE
NRBC BLD-RTO: 0 /100 WBC
PH UR STRIP: 6 [PH] (ref 5–8)
PLATELET # BLD AUTO: 396 K/UL (ref 150–450)
PMV BLD AUTO: 9.4 FL (ref 9.2–12.9)
POTASSIUM SERPL-SCNC: 3.3 MMOL/L (ref 3.5–5.1)
PROCALCITONIN SERPL IA-MCNC: 0.1 NG/ML
PROT SERPL-MCNC: 8 G/DL (ref 6–8.4)
PROT UR QL STRIP: NEGATIVE
RBC # BLD AUTO: 3.59 M/UL (ref 4–5.4)
RBC #/AREA URNS HPF: 2 /HPF (ref 0–4)
SARS-COV-2 RDRP RESP QL NAA+PROBE: NEGATIVE
SODIUM SERPL-SCNC: 133 MMOL/L (ref 136–145)
SP GR UR STRIP: 1.01 (ref 1–1.03)
URN SPEC COLLECT METH UR: ABNORMAL
UROBILINOGEN UR STRIP-ACNC: NEGATIVE EU/DL
WBC # BLD AUTO: 8.53 K/UL (ref 3.9–12.7)
WBC #/AREA URNS HPF: 11 /HPF (ref 0–5)

## 2021-06-02 PROCEDURE — 96367 TX/PROPH/DG ADDL SEQ IV INF: CPT

## 2021-06-02 PROCEDURE — 80053 COMPREHEN METABOLIC PANEL: CPT | Performed by: PHYSICIAN ASSISTANT

## 2021-06-02 PROCEDURE — 96365 THER/PROPH/DIAG IV INF INIT: CPT

## 2021-06-02 PROCEDURE — 71046 XR CHEST PA AND LATERAL: ICD-10-PCS | Mod: FY,S$GLB,, | Performed by: RADIOLOGY

## 2021-06-02 PROCEDURE — 99291 CRITICAL CARE FIRST HOUR: CPT | Mod: 25

## 2021-06-02 PROCEDURE — 81000 URINALYSIS NONAUTO W/SCOPE: CPT | Performed by: EMERGENCY MEDICINE

## 2021-06-02 PROCEDURE — 71046 X-RAY EXAM CHEST 2 VIEWS: CPT | Mod: FY,S$GLB,, | Performed by: RADIOLOGY

## 2021-06-02 PROCEDURE — 99213 OFFICE O/P EST LOW 20 MIN: CPT | Mod: S$GLB,,, | Performed by: NURSE PRACTITIONER

## 2021-06-02 PROCEDURE — 1125F AMNT PAIN NOTED PAIN PRSNT: CPT | Mod: S$GLB,,, | Performed by: NURSE PRACTITIONER

## 2021-06-02 PROCEDURE — U0002: ICD-10-PCS | Mod: QW,S$GLB,, | Performed by: NURSE PRACTITIONER

## 2021-06-02 PROCEDURE — 87077 CULTURE AEROBIC IDENTIFY: CPT | Mod: 59 | Performed by: EMERGENCY MEDICINE

## 2021-06-02 PROCEDURE — 63600175 PHARM REV CODE 636 W HCPCS: Performed by: EMERGENCY MEDICINE

## 2021-06-02 PROCEDURE — 93005 ELECTROCARDIOGRAM TRACING: CPT

## 2021-06-02 PROCEDURE — 99213 PR OFFICE/OUTPT VISIT, EST, LEVL III, 20-29 MIN: ICD-10-PCS | Mod: S$GLB,,, | Performed by: NURSE PRACTITIONER

## 2021-06-02 PROCEDURE — 93010 EKG 12-LEAD: ICD-10-PCS | Mod: ,,, | Performed by: INTERNAL MEDICINE

## 2021-06-02 PROCEDURE — 83605 ASSAY OF LACTIC ACID: CPT | Performed by: PHYSICIAN ASSISTANT

## 2021-06-02 PROCEDURE — 87186 SC STD MICRODIL/AGAR DIL: CPT | Mod: 59 | Performed by: EMERGENCY MEDICINE

## 2021-06-02 PROCEDURE — 1125F PR PAIN SEVERITY QUANTIFIED, PAIN PRESENT: ICD-10-PCS | Mod: S$GLB,,, | Performed by: NURSE PRACTITIONER

## 2021-06-02 PROCEDURE — 3008F BODY MASS INDEX DOCD: CPT | Mod: CPTII,S$GLB,, | Performed by: NURSE PRACTITIONER

## 2021-06-02 PROCEDURE — 96375 TX/PRO/DX INJ NEW DRUG ADDON: CPT

## 2021-06-02 PROCEDURE — 93010 ELECTROCARDIOGRAM REPORT: CPT | Mod: ,,, | Performed by: INTERNAL MEDICINE

## 2021-06-02 PROCEDURE — 3008F PR BODY MASS INDEX (BMI) DOCUMENTED: ICD-10-PCS | Mod: CPTII,S$GLB,, | Performed by: NURSE PRACTITIONER

## 2021-06-02 PROCEDURE — 87040 BLOOD CULTURE FOR BACTERIA: CPT | Mod: 59 | Performed by: EMERGENCY MEDICINE

## 2021-06-02 PROCEDURE — 12000002 HC ACUTE/MED SURGE SEMI-PRIVATE ROOM

## 2021-06-02 PROCEDURE — U0002 COVID-19 LAB TEST NON-CDC: HCPCS | Mod: QW,S$GLB,, | Performed by: NURSE PRACTITIONER

## 2021-06-02 PROCEDURE — 83880 ASSAY OF NATRIURETIC PEPTIDE: CPT | Performed by: EMERGENCY MEDICINE

## 2021-06-02 PROCEDURE — 85025 COMPLETE CBC W/AUTO DIFF WBC: CPT | Performed by: PHYSICIAN ASSISTANT

## 2021-06-02 PROCEDURE — 87086 URINE CULTURE/COLONY COUNT: CPT | Mod: 59 | Performed by: EMERGENCY MEDICINE

## 2021-06-02 PROCEDURE — 87088 URINE BACTERIA CULTURE: CPT | Mod: 59 | Performed by: EMERGENCY MEDICINE

## 2021-06-02 PROCEDURE — 25500020 PHARM REV CODE 255: Performed by: EMERGENCY MEDICINE

## 2021-06-02 PROCEDURE — 84145 PROCALCITONIN (PCT): CPT | Performed by: EMERGENCY MEDICINE

## 2021-06-02 PROCEDURE — 25000003 PHARM REV CODE 250: Performed by: EMERGENCY MEDICINE

## 2021-06-02 RX ORDER — GLUCAGON 1 MG
1 KIT INJECTION
Status: DISCONTINUED | OUTPATIENT
Start: 2021-06-03 | End: 2021-06-03 | Stop reason: HOSPADM

## 2021-06-02 RX ORDER — ASPIRIN 81 MG/1
81 TABLET ORAL DAILY
Status: DISCONTINUED | OUTPATIENT
Start: 2021-06-03 | End: 2021-06-03 | Stop reason: HOSPADM

## 2021-06-02 RX ORDER — QUETIAPINE FUMARATE 100 MG/1
200 TABLET, FILM COATED ORAL NIGHTLY
Status: DISCONTINUED | OUTPATIENT
Start: 2021-06-03 | End: 2021-06-03 | Stop reason: HOSPADM

## 2021-06-02 RX ORDER — FLUOXETINE HYDROCHLORIDE 20 MG/1
60 CAPSULE ORAL DAILY
Status: DISCONTINUED | OUTPATIENT
Start: 2021-06-03 | End: 2021-06-03 | Stop reason: HOSPADM

## 2021-06-02 RX ORDER — ENOXAPARIN SODIUM 100 MG/ML
40 INJECTION SUBCUTANEOUS EVERY 24 HOURS
Status: DISCONTINUED | OUTPATIENT
Start: 2021-06-03 | End: 2021-06-03 | Stop reason: HOSPADM

## 2021-06-02 RX ORDER — HYDROCODONE BITARTRATE AND ACETAMINOPHEN 10; 325 MG/1; MG/1
1 TABLET ORAL EVERY 6 HOURS PRN
Status: DISCONTINUED | OUTPATIENT
Start: 2021-06-03 | End: 2021-06-03 | Stop reason: HOSPADM

## 2021-06-02 RX ORDER — IBUPROFEN 200 MG
24 TABLET ORAL
Status: DISCONTINUED | OUTPATIENT
Start: 2021-06-03 | End: 2021-06-03 | Stop reason: HOSPADM

## 2021-06-02 RX ORDER — ATORVASTATIN CALCIUM 40 MG/1
80 TABLET, FILM COATED ORAL DAILY
Status: DISCONTINUED | OUTPATIENT
Start: 2021-06-03 | End: 2021-06-03 | Stop reason: HOSPADM

## 2021-06-02 RX ORDER — MUPIROCIN 20 MG/G
OINTMENT TOPICAL 2 TIMES DAILY
Status: DISCONTINUED | OUTPATIENT
Start: 2021-06-03 | End: 2021-06-03 | Stop reason: HOSPADM

## 2021-06-02 RX ORDER — BUTALBITAL, ACETAMINOPHEN AND CAFFEINE 50; 325; 40 MG/1; MG/1; MG/1
1 TABLET ORAL EVERY 4 HOURS PRN
Status: DISCONTINUED | OUTPATIENT
Start: 2021-06-03 | End: 2021-06-03 | Stop reason: HOSPADM

## 2021-06-02 RX ORDER — TORSEMIDE 20 MG/1
100 TABLET ORAL DAILY
Status: DISCONTINUED | OUTPATIENT
Start: 2021-06-03 | End: 2021-06-03

## 2021-06-02 RX ORDER — ONDANSETRON 2 MG/ML
4 INJECTION INTRAMUSCULAR; INTRAVENOUS EVERY 8 HOURS PRN
Status: DISCONTINUED | OUTPATIENT
Start: 2021-06-03 | End: 2021-06-03 | Stop reason: HOSPADM

## 2021-06-02 RX ORDER — IBUPROFEN 200 MG
16 TABLET ORAL
Status: DISCONTINUED | OUTPATIENT
Start: 2021-06-03 | End: 2021-06-03 | Stop reason: HOSPADM

## 2021-06-02 RX ORDER — TRAZODONE HYDROCHLORIDE 100 MG/1
300 TABLET ORAL NIGHTLY
Status: DISCONTINUED | OUTPATIENT
Start: 2021-06-03 | End: 2021-06-03 | Stop reason: HOSPADM

## 2021-06-02 RX ORDER — IPRATROPIUM BROMIDE AND ALBUTEROL SULFATE 2.5; .5 MG/3ML; MG/3ML
3 SOLUTION RESPIRATORY (INHALATION) EVERY 4 HOURS PRN
Status: DISCONTINUED | OUTPATIENT
Start: 2021-06-03 | End: 2021-06-03 | Stop reason: HOSPADM

## 2021-06-02 RX ORDER — OXYBUTYNIN CHLORIDE 5 MG/1
15 TABLET, EXTENDED RELEASE ORAL DAILY
Status: DISCONTINUED | OUTPATIENT
Start: 2021-06-03 | End: 2021-06-03 | Stop reason: HOSPADM

## 2021-06-02 RX ORDER — TRAMADOL HYDROCHLORIDE 50 MG/1
50 TABLET ORAL
Status: COMPLETED | OUTPATIENT
Start: 2021-06-02 | End: 2021-06-02

## 2021-06-02 RX ADMIN — AZITHROMYCIN DIHYDRATE 500 MG: 500 INJECTION, POWDER, LYOPHILIZED, FOR SOLUTION INTRAVENOUS at 10:06

## 2021-06-02 RX ADMIN — TRAMADOL HYDROCHLORIDE 50 MG: 50 TABLET, FILM COATED ORAL at 09:06

## 2021-06-02 RX ADMIN — IOHEXOL 100 ML: 350 INJECTION, SOLUTION INTRAVENOUS at 09:06

## 2021-06-02 RX ADMIN — CEFTRIAXONE 1 G: 1 INJECTION, SOLUTION INTRAVENOUS at 09:06

## 2021-06-03 VITALS
HEART RATE: 54 BPM | SYSTOLIC BLOOD PRESSURE: 114 MMHG | BODY MASS INDEX: 30.12 KG/M2 | RESPIRATION RATE: 21 BRPM | WEIGHT: 181 LBS | OXYGEN SATURATION: 100 % | TEMPERATURE: 98 F | DIASTOLIC BLOOD PRESSURE: 75 MMHG

## 2021-06-03 PROBLEM — N39.0 UTI (URINARY TRACT INFECTION): Status: ACTIVE | Noted: 2021-06-03

## 2021-06-03 PROBLEM — J18.9 COMMUNITY ACQUIRED PNEUMONIA: Status: ACTIVE | Noted: 2021-06-03

## 2021-06-03 LAB
ALBUMIN SERPL BCP-MCNC: 2.3 G/DL (ref 3.5–5.2)
ALP SERPL-CCNC: 127 U/L (ref 55–135)
ALT SERPL W/O P-5'-P-CCNC: 6 U/L (ref 10–44)
ANION GAP SERPL CALC-SCNC: 10 MMOL/L (ref 8–16)
AST SERPL-CCNC: 15 U/L (ref 10–40)
BASOPHILS # BLD AUTO: 0.01 K/UL (ref 0–0.2)
BASOPHILS NFR BLD: 0.2 % (ref 0–1.9)
BILIRUB SERPL-MCNC: 0.3 MG/DL (ref 0.1–1)
BUN SERPL-MCNC: 7 MG/DL (ref 8–23)
CALCIUM SERPL-MCNC: 8.4 MG/DL (ref 8.7–10.5)
CHLORIDE SERPL-SCNC: 93 MMOL/L (ref 95–110)
CO2 SERPL-SCNC: 33 MMOL/L (ref 23–29)
CREAT SERPL-MCNC: 0.8 MG/DL (ref 0.5–1.4)
DIFFERENTIAL METHOD: ABNORMAL
EOSINOPHIL # BLD AUTO: 0.2 K/UL (ref 0–0.5)
EOSINOPHIL NFR BLD: 3 % (ref 0–8)
ERYTHROCYTE [DISTWIDTH] IN BLOOD BY AUTOMATED COUNT: 13.1 % (ref 11.5–14.5)
EST. GFR  (AFRICAN AMERICAN): >60 ML/MIN/1.73 M^2
EST. GFR  (NON AFRICAN AMERICAN): >60 ML/MIN/1.73 M^2
GLUCOSE SERPL-MCNC: 94 MG/DL (ref 70–110)
HCT VFR BLD AUTO: 27.5 % (ref 37–48.5)
HGB BLD-MCNC: 8.9 G/DL (ref 12–16)
IMM GRANULOCYTES # BLD AUTO: 0.02 K/UL (ref 0–0.04)
IMM GRANULOCYTES NFR BLD AUTO: 0.3 % (ref 0–0.5)
LYMPHOCYTES # BLD AUTO: 1.1 K/UL (ref 1–4.8)
LYMPHOCYTES NFR BLD: 18.9 % (ref 18–48)
MAGNESIUM SERPL-MCNC: 1.7 MG/DL (ref 1.6–2.6)
MCH RBC QN AUTO: 27.8 PG (ref 27–31)
MCHC RBC AUTO-ENTMCNC: 32.4 G/DL (ref 32–36)
MCV RBC AUTO: 86 FL (ref 82–98)
MONOCYTES # BLD AUTO: 0.7 K/UL (ref 0.3–1)
MONOCYTES NFR BLD: 11.4 % (ref 4–15)
NEUTROPHILS # BLD AUTO: 4 K/UL (ref 1.8–7.7)
NEUTROPHILS NFR BLD: 66.2 % (ref 38–73)
NRBC BLD-RTO: 0 /100 WBC
PLATELET # BLD AUTO: 365 K/UL (ref 150–450)
PMV BLD AUTO: 9 FL (ref 9.2–12.9)
POTASSIUM SERPL-SCNC: 3.1 MMOL/L (ref 3.5–5.1)
PROT SERPL-MCNC: 6.2 G/DL (ref 6–8.4)
RBC # BLD AUTO: 3.2 M/UL (ref 4–5.4)
SODIUM SERPL-SCNC: 136 MMOL/L (ref 136–145)
WBC # BLD AUTO: 6.03 K/UL (ref 3.9–12.7)

## 2021-06-03 PROCEDURE — 63600175 PHARM REV CODE 636 W HCPCS: Performed by: NURSE PRACTITIONER

## 2021-06-03 PROCEDURE — 83735 ASSAY OF MAGNESIUM: CPT | Performed by: NURSE PRACTITIONER

## 2021-06-03 PROCEDURE — 85025 COMPLETE CBC W/AUTO DIFF WBC: CPT | Performed by: NURSE PRACTITIONER

## 2021-06-03 PROCEDURE — 25000003 PHARM REV CODE 250: Performed by: NURSE PRACTITIONER

## 2021-06-03 PROCEDURE — 25000003 PHARM REV CODE 250: Performed by: EMERGENCY MEDICINE

## 2021-06-03 PROCEDURE — 80053 COMPREHEN METABOLIC PANEL: CPT | Performed by: NURSE PRACTITIONER

## 2021-06-03 RX ORDER — SODIUM CHLORIDE FOR INHALATION 3 %
4 VIAL, NEBULIZER (ML) INHALATION
Status: DISCONTINUED | OUTPATIENT
Start: 2021-06-03 | End: 2021-06-03 | Stop reason: HOSPADM

## 2021-06-03 RX ORDER — ATORVASTATIN CALCIUM 40 MG/1
80 TABLET, FILM COATED ORAL DAILY
Status: CANCELLED | OUTPATIENT
Start: 2021-06-04

## 2021-06-03 RX ORDER — SODIUM CHLORIDE FOR INHALATION 3 %
4 VIAL, NEBULIZER (ML) INHALATION
Status: CANCELLED | OUTPATIENT
Start: 2021-06-03

## 2021-06-03 RX ORDER — GLUCAGON 1 MG
1 KIT INJECTION
Status: CANCELLED | OUTPATIENT
Start: 2021-06-03

## 2021-06-03 RX ORDER — IBUPROFEN 200 MG
24 TABLET ORAL
Status: CANCELLED | OUTPATIENT
Start: 2021-06-03

## 2021-06-03 RX ORDER — SODIUM CHLORIDE, SODIUM LACTATE, POTASSIUM CHLORIDE, CALCIUM CHLORIDE 600; 310; 30; 20 MG/100ML; MG/100ML; MG/100ML; MG/100ML
INJECTION, SOLUTION INTRAVENOUS CONTINUOUS
Status: CANCELLED | OUTPATIENT
Start: 2021-06-03

## 2021-06-03 RX ORDER — HYDROCODONE BITARTRATE AND ACETAMINOPHEN 10; 325 MG/1; MG/1
1 TABLET ORAL EVERY 6 HOURS PRN
Status: CANCELLED | OUTPATIENT
Start: 2021-06-03

## 2021-06-03 RX ORDER — QUETIAPINE FUMARATE 100 MG/1
200 TABLET, FILM COATED ORAL NIGHTLY
Status: CANCELLED | OUTPATIENT
Start: 2021-06-03

## 2021-06-03 RX ORDER — OXYBUTYNIN CHLORIDE 5 MG/1
15 TABLET, EXTENDED RELEASE ORAL DAILY
Status: CANCELLED | OUTPATIENT
Start: 2021-06-04

## 2021-06-03 RX ORDER — GUAIFENESIN 100 MG/5ML
200 SOLUTION ORAL EVERY 4 HOURS PRN
Status: DISCONTINUED | OUTPATIENT
Start: 2021-06-03 | End: 2021-06-03 | Stop reason: HOSPADM

## 2021-06-03 RX ORDER — TRAZODONE HYDROCHLORIDE 100 MG/1
300 TABLET ORAL NIGHTLY
Status: CANCELLED | OUTPATIENT
Start: 2021-06-03

## 2021-06-03 RX ORDER — ENOXAPARIN SODIUM 100 MG/ML
40 INJECTION SUBCUTANEOUS EVERY 24 HOURS
Status: CANCELLED | OUTPATIENT
Start: 2021-06-03

## 2021-06-03 RX ORDER — ALBUTEROL SULFATE 2.5 MG/.5ML
2.5 SOLUTION RESPIRATORY (INHALATION)
Status: CANCELLED | OUTPATIENT
Start: 2021-06-03

## 2021-06-03 RX ORDER — ALBUTEROL SULFATE 2.5 MG/.5ML
2.5 SOLUTION RESPIRATORY (INHALATION)
Status: DISCONTINUED | OUTPATIENT
Start: 2021-06-03 | End: 2021-06-03 | Stop reason: HOSPADM

## 2021-06-03 RX ORDER — ASPIRIN 81 MG/1
81 TABLET ORAL DAILY
Status: CANCELLED | OUTPATIENT
Start: 2021-06-04

## 2021-06-03 RX ORDER — FLUOXETINE HYDROCHLORIDE 20 MG/1
60 CAPSULE ORAL DAILY
Status: CANCELLED | OUTPATIENT
Start: 2021-06-04

## 2021-06-03 RX ORDER — GUAIFENESIN 100 MG/5ML
200 SOLUTION ORAL EVERY 4 HOURS PRN
Status: CANCELLED | OUTPATIENT
Start: 2021-06-03

## 2021-06-03 RX ORDER — SODIUM CHLORIDE, SODIUM LACTATE, POTASSIUM CHLORIDE, CALCIUM CHLORIDE 600; 310; 30; 20 MG/100ML; MG/100ML; MG/100ML; MG/100ML
INJECTION, SOLUTION INTRAVENOUS CONTINUOUS
Status: DISCONTINUED | OUTPATIENT
Start: 2021-06-03 | End: 2021-06-03 | Stop reason: HOSPADM

## 2021-06-03 RX ORDER — IBUPROFEN 200 MG
16 TABLET ORAL
Status: CANCELLED | OUTPATIENT
Start: 2021-06-03

## 2021-06-03 RX ORDER — IPRATROPIUM BROMIDE AND ALBUTEROL SULFATE 2.5; .5 MG/3ML; MG/3ML
3 SOLUTION RESPIRATORY (INHALATION) EVERY 4 HOURS PRN
Status: CANCELLED | OUTPATIENT
Start: 2021-06-03

## 2021-06-03 RX ORDER — BUTALBITAL, ACETAMINOPHEN AND CAFFEINE 50; 325; 40 MG/1; MG/1; MG/1
1 TABLET ORAL EVERY 4 HOURS PRN
Status: CANCELLED | OUTPATIENT
Start: 2021-06-03

## 2021-06-03 RX ORDER — MUPIROCIN 20 MG/G
OINTMENT TOPICAL 2 TIMES DAILY
Status: CANCELLED | OUTPATIENT
Start: 2021-06-03 | End: 2021-06-08

## 2021-06-03 RX ORDER — ONDANSETRON 2 MG/ML
4 INJECTION INTRAMUSCULAR; INTRAVENOUS EVERY 8 HOURS PRN
Status: CANCELLED | OUTPATIENT
Start: 2021-06-03

## 2021-06-03 RX ADMIN — QUETIAPINE 200 MG: 100 TABLET ORAL at 12:06

## 2021-06-03 RX ADMIN — SODIUM CHLORIDE, SODIUM LACTATE, POTASSIUM CHLORIDE, AND CALCIUM CHLORIDE: .6; .31; .03; .02 INJECTION, SOLUTION INTRAVENOUS at 04:06

## 2021-06-03 RX ADMIN — TRAZODONE HYDROCHLORIDE 300 MG: 100 TABLET ORAL at 12:06

## 2021-06-03 RX ADMIN — HYDROCODONE BITARTRATE AND ACETAMINOPHEN 1 TABLET: 10; 325 TABLET ORAL at 02:06

## 2021-06-03 RX ADMIN — FLUOXETINE HYDROCHLORIDE 60 MG: 20 CAPSULE ORAL at 09:06

## 2021-06-03 RX ADMIN — ATORVASTATIN CALCIUM 80 MG: 40 TABLET, FILM COATED ORAL at 09:06

## 2021-06-03 RX ADMIN — MUPIROCIN: 20 OINTMENT TOPICAL at 09:06

## 2021-06-03 RX ADMIN — ASPIRIN 81 MG: 81 TABLET, COATED ORAL at 09:06

## 2021-06-04 ENCOUNTER — PATIENT MESSAGE (OUTPATIENT)
Dept: INTERNAL MEDICINE | Facility: CLINIC | Age: 66
End: 2021-06-04

## 2021-06-05 LAB — BACTERIA UR CULT: ABNORMAL

## 2021-06-07 ENCOUNTER — TELEPHONE (OUTPATIENT)
Dept: UROLOGY | Facility: CLINIC | Age: 66
End: 2021-06-07

## 2021-06-08 ENCOUNTER — PATIENT OUTREACH (OUTPATIENT)
Dept: ADMINISTRATIVE | Facility: CLINIC | Age: 66
End: 2021-06-08

## 2021-06-08 LAB
BACTERIA BLD CULT: NORMAL
BACTERIA BLD CULT: NORMAL

## 2021-06-08 NOTE — TELEPHONE ENCOUNTER
Called the patient and discussed that she was admitted for 6 days for pneumonia.  Would be best to let her recover fully before doing the cystoscopy with Botox injection of the bladder.  Will have Debbie reschedule her for next month.

## 2021-06-08 NOTE — TELEPHONE ENCOUNTER
----- Message from Lakesha Cam sent at 6/8/2021  1:40 PM CDT -----  Pt is calling to speak with the nurse and would like for the nurse to give her a call back

## 2021-06-10 ENCOUNTER — TELEPHONE (OUTPATIENT)
Dept: INTERNAL MEDICINE | Facility: CLINIC | Age: 66
End: 2021-06-10

## 2021-06-10 ENCOUNTER — HOSPITAL ENCOUNTER (OUTPATIENT)
Facility: HOSPITAL | Age: 66
Discharge: HOME | End: 2021-06-12
Attending: EMERGENCY MEDICINE | Admitting: HOSPITALIST
Payer: MEDICARE

## 2021-06-10 DIAGNOSIS — R07.9 CHEST PAIN: ICD-10-CM

## 2021-06-10 DIAGNOSIS — M62.838 MUSCLE SPASM: ICD-10-CM

## 2021-06-10 DIAGNOSIS — J18.9 PNEUMONIA OF LEFT LOWER LOBE DUE TO INFECTIOUS ORGANISM: Primary | ICD-10-CM

## 2021-06-10 DIAGNOSIS — J15.69 PNEUMONIA DUE TO OTHER AEROBIC GRAM-NEGATIVE BACTERIA, UNSPECIFIED LATERALITY, UNSPECIFIED PART OF LUNG: ICD-10-CM

## 2021-06-10 DIAGNOSIS — R00.1 BRADYCARDIA: ICD-10-CM

## 2021-06-10 DIAGNOSIS — R06.02 SOB (SHORTNESS OF BREATH): ICD-10-CM

## 2021-06-10 PROBLEM — R25.2 SPASMS OF THE HANDS OR FEET: Status: ACTIVE | Noted: 2021-06-10

## 2021-06-10 LAB
ALBUMIN SERPL BCP-MCNC: 3.1 G/DL (ref 3.5–5.2)
ALLENS TEST: ABNORMAL
ALP SERPL-CCNC: 139 U/L (ref 55–135)
ALT SERPL W/O P-5'-P-CCNC: 9 U/L (ref 10–44)
ANION GAP SERPL CALC-SCNC: 10 MMOL/L (ref 8–16)
AST SERPL-CCNC: 14 U/L (ref 10–40)
BACTERIA #/AREA URNS AUTO: ABNORMAL /HPF
BASOPHILS # BLD AUTO: 0.02 K/UL (ref 0–0.2)
BASOPHILS NFR BLD: 0.3 % (ref 0–1.9)
BILIRUB SERPL-MCNC: 0.2 MG/DL (ref 0.1–1)
BILIRUB UR QL STRIP: NEGATIVE
BNP SERPL-MCNC: 24 PG/ML (ref 0–99)
BUN SERPL-MCNC: 12 MG/DL (ref 8–23)
CALCIUM SERPL-MCNC: 9.7 MG/DL (ref 8.7–10.5)
CHLORIDE SERPL-SCNC: 91 MMOL/L (ref 95–110)
CLARITY UR REFRACT.AUTO: CLEAR
CO2 SERPL-SCNC: 34 MMOL/L (ref 23–29)
COLOR UR AUTO: ABNORMAL
CREAT SERPL-MCNC: 1.4 MG/DL (ref 0.5–1.4)
DIFFERENTIAL METHOD: ABNORMAL
EOSINOPHIL # BLD AUTO: 0.3 K/UL (ref 0–0.5)
EOSINOPHIL NFR BLD: 3.6 % (ref 0–8)
ERYTHROCYTE [DISTWIDTH] IN BLOOD BY AUTOMATED COUNT: 13.4 % (ref 11.5–14.5)
EST. GFR  (AFRICAN AMERICAN): 45.8 ML/MIN/1.73 M^2
EST. GFR  (NON AFRICAN AMERICAN): 39.7 ML/MIN/1.73 M^2
FERRITIN SERPL-MCNC: 121 NG/ML (ref 20–300)
GLUCOSE SERPL-MCNC: 87 MG/DL (ref 70–110)
GLUCOSE UR QL STRIP: NEGATIVE
HCO3 UR-SCNC: 37.2 MMOL/L (ref 24–28)
HCT VFR BLD AUTO: 31.3 % (ref 37–48.5)
HGB BLD-MCNC: 9.9 G/DL (ref 12–16)
HGB UR QL STRIP: ABNORMAL
IMM GRANULOCYTES # BLD AUTO: 0.12 K/UL (ref 0–0.04)
IMM GRANULOCYTES NFR BLD AUTO: 1.6 % (ref 0–0.5)
IRON SERPL-MCNC: 92 UG/DL (ref 30–160)
KETONES UR QL STRIP: NEGATIVE
LACTATE SERPL-SCNC: 1.2 MMOL/L (ref 0.5–2.2)
LEUKOCYTE ESTERASE UR QL STRIP: ABNORMAL
LYMPHOCYTES # BLD AUTO: 1.6 K/UL (ref 1–4.8)
LYMPHOCYTES NFR BLD: 20.9 % (ref 18–48)
MCH RBC QN AUTO: 27.7 PG (ref 27–31)
MCHC RBC AUTO-ENTMCNC: 31.6 G/DL (ref 32–36)
MCV RBC AUTO: 87 FL (ref 82–98)
MICROSCOPIC COMMENT: ABNORMAL
MONOCYTES # BLD AUTO: 0.7 K/UL (ref 0.3–1)
MONOCYTES NFR BLD: 8.7 % (ref 4–15)
NEUTROPHILS # BLD AUTO: 4.9 K/UL (ref 1.8–7.7)
NEUTROPHILS NFR BLD: 64.9 % (ref 38–73)
NITRITE UR QL STRIP: NEGATIVE
NRBC BLD-RTO: 0 /100 WBC
PCO2 BLDA: 67.4 MMHG (ref 35–45)
PH SMN: 7.35 [PH] (ref 7.35–7.45)
PH UR STRIP: 6 [PH] (ref 5–8)
PLATELET # BLD AUTO: 313 K/UL (ref 150–450)
PMV BLD AUTO: 9.8 FL (ref 9.2–12.9)
PO2 BLDA: 29 MMHG (ref 40–60)
POC BE: 12 MMOL/L
POC SATURATED O2: 49 % (ref 95–100)
POC TCO2: 39 MMOL/L (ref 24–29)
POTASSIUM SERPL-SCNC: 4.2 MMOL/L (ref 3.5–5.1)
PROT SERPL-MCNC: 7.2 G/DL (ref 6–8.4)
PROT UR QL STRIP: NEGATIVE
RBC # BLD AUTO: 3.58 M/UL (ref 4–5.4)
RBC #/AREA URNS AUTO: 6 /HPF (ref 0–4)
SAMPLE: ABNORMAL
SATURATED IRON: 28 % (ref 20–50)
SITE: ABNORMAL
SODIUM SERPL-SCNC: 135 MMOL/L (ref 136–145)
SP GR UR STRIP: 1 (ref 1–1.03)
TOTAL IRON BINDING CAPACITY: 330 UG/DL (ref 250–450)
TRANSFERRIN SERPL-MCNC: 223 MG/DL (ref 200–375)
TROPONIN I SERPL DL<=0.01 NG/ML-MCNC: <0.006 NG/ML (ref 0–0.03)
URN SPEC COLLECT METH UR: ABNORMAL
VIT B12 SERPL-MCNC: 175 PG/ML (ref 210–950)
WBC # BLD AUTO: 7.48 K/UL (ref 3.9–12.7)
WBC #/AREA URNS AUTO: 10 /HPF (ref 0–5)

## 2021-06-10 PROCEDURE — 96376 TX/PRO/DX INJ SAME DRUG ADON: CPT

## 2021-06-10 PROCEDURE — 96375 TX/PRO/DX INJ NEW DRUG ADDON: CPT

## 2021-06-10 PROCEDURE — 25000003 PHARM REV CODE 250: Performed by: STUDENT IN AN ORGANIZED HEALTH CARE EDUCATION/TRAINING PROGRAM

## 2021-06-10 PROCEDURE — 84484 ASSAY OF TROPONIN QUANT: CPT | Performed by: STUDENT IN AN ORGANIZED HEALTH CARE EDUCATION/TRAINING PROGRAM

## 2021-06-10 PROCEDURE — 99900035 HC TECH TIME PER 15 MIN (STAT)

## 2021-06-10 PROCEDURE — 85025 COMPLETE CBC W/AUTO DIFF WBC: CPT | Performed by: STUDENT IN AN ORGANIZED HEALTH CARE EDUCATION/TRAINING PROGRAM

## 2021-06-10 PROCEDURE — 82800 BLOOD PH: CPT

## 2021-06-10 PROCEDURE — 96372 THER/PROPH/DIAG INJ SC/IM: CPT | Mod: 59

## 2021-06-10 PROCEDURE — 99285 PR EMERGENCY DEPT VISIT,LEVEL V: ICD-10-PCS | Mod: GC,,, | Performed by: EMERGENCY MEDICINE

## 2021-06-10 PROCEDURE — 96361 HYDRATE IV INFUSION ADD-ON: CPT

## 2021-06-10 PROCEDURE — 99285 EMERGENCY DEPT VISIT HI MDM: CPT | Mod: 25

## 2021-06-10 PROCEDURE — 63600175 PHARM REV CODE 636 W HCPCS: Performed by: STUDENT IN AN ORGANIZED HEALTH CARE EDUCATION/TRAINING PROGRAM

## 2021-06-10 PROCEDURE — 82607 VITAMIN B-12: CPT | Performed by: STUDENT IN AN ORGANIZED HEALTH CARE EDUCATION/TRAINING PROGRAM

## 2021-06-10 PROCEDURE — 83605 ASSAY OF LACTIC ACID: CPT | Performed by: STUDENT IN AN ORGANIZED HEALTH CARE EDUCATION/TRAINING PROGRAM

## 2021-06-10 PROCEDURE — 99220 PR INITIAL OBSERVATION CARE,LEVL III: CPT | Mod: GC,,, | Performed by: HOSPITALIST

## 2021-06-10 PROCEDURE — 81001 URINALYSIS AUTO W/SCOPE: CPT | Performed by: STUDENT IN AN ORGANIZED HEALTH CARE EDUCATION/TRAINING PROGRAM

## 2021-06-10 PROCEDURE — 82728 ASSAY OF FERRITIN: CPT | Performed by: STUDENT IN AN ORGANIZED HEALTH CARE EDUCATION/TRAINING PROGRAM

## 2021-06-10 PROCEDURE — 96374 THER/PROPH/DIAG INJ IV PUSH: CPT

## 2021-06-10 PROCEDURE — 83540 ASSAY OF IRON: CPT | Performed by: STUDENT IN AN ORGANIZED HEALTH CARE EDUCATION/TRAINING PROGRAM

## 2021-06-10 PROCEDURE — G0378 HOSPITAL OBSERVATION PER HR: HCPCS

## 2021-06-10 PROCEDURE — 87040 BLOOD CULTURE FOR BACTERIA: CPT | Performed by: STUDENT IN AN ORGANIZED HEALTH CARE EDUCATION/TRAINING PROGRAM

## 2021-06-10 PROCEDURE — 80053 COMPREHEN METABOLIC PANEL: CPT | Performed by: STUDENT IN AN ORGANIZED HEALTH CARE EDUCATION/TRAINING PROGRAM

## 2021-06-10 PROCEDURE — 99220 PR INITIAL OBSERVATION CARE,LEVL III: ICD-10-PCS | Mod: GC,,, | Performed by: HOSPITALIST

## 2021-06-10 PROCEDURE — 83880 ASSAY OF NATRIURETIC PEPTIDE: CPT | Performed by: STUDENT IN AN ORGANIZED HEALTH CARE EDUCATION/TRAINING PROGRAM

## 2021-06-10 PROCEDURE — 82803 BLOOD GASES ANY COMBINATION: CPT

## 2021-06-10 PROCEDURE — 99285 EMERGENCY DEPT VISIT HI MDM: CPT | Mod: GC,,, | Performed by: EMERGENCY MEDICINE

## 2021-06-10 RX ORDER — NAPROXEN SODIUM 220 MG/1
81 TABLET, FILM COATED ORAL DAILY
Status: DISCONTINUED | OUTPATIENT
Start: 2021-06-11 | End: 2021-06-12 | Stop reason: HOSPADM

## 2021-06-10 RX ORDER — SODIUM CHLORIDE 0.9 % (FLUSH) 0.9 %
10 SYRINGE (ML) INJECTION
Status: DISCONTINUED | OUTPATIENT
Start: 2021-06-10 | End: 2021-06-12 | Stop reason: HOSPADM

## 2021-06-10 RX ORDER — BENZTROPINE MESYLATE 1 MG/ML
1 INJECTION, SOLUTION INTRAMUSCULAR; INTRAVENOUS
Status: COMPLETED | OUTPATIENT
Start: 2021-06-10 | End: 2021-06-10

## 2021-06-10 RX ORDER — SODIUM CHLORIDE 0.9 % (FLUSH) 0.9 %
10 SYRINGE (ML) INJECTION
Status: CANCELLED | OUTPATIENT
Start: 2021-06-10

## 2021-06-10 RX ORDER — FLUOXETINE HYDROCHLORIDE 20 MG/1
40 CAPSULE ORAL DAILY
Status: DISCONTINUED | OUTPATIENT
Start: 2021-06-11 | End: 2021-06-12 | Stop reason: HOSPADM

## 2021-06-10 RX ORDER — TORSEMIDE 100 MG/1
100 TABLET ORAL DAILY
Status: CANCELLED | OUTPATIENT
Start: 2021-06-11

## 2021-06-10 RX ORDER — LEVOFLOXACIN 750 MG/1
750 TABLET ORAL DAILY
Status: DISCONTINUED | OUTPATIENT
Start: 2021-06-11 | End: 2021-06-10

## 2021-06-10 RX ORDER — ASPIRIN 81 MG/1
81 TABLET ORAL DAILY
Status: CANCELLED | OUTPATIENT
Start: 2021-06-11

## 2021-06-10 RX ORDER — BUTALBITAL, ACETAMINOPHEN AND CAFFEINE 50; 325; 40 MG/1; MG/1; MG/1
1 TABLET ORAL EVERY 4 HOURS PRN
Status: DISCONTINUED | OUTPATIENT
Start: 2021-06-10 | End: 2021-06-10

## 2021-06-10 RX ORDER — POLYETHYLENE GLYCOL 3350 17 G/17G
17 POWDER, FOR SOLUTION ORAL DAILY
Status: DISCONTINUED | OUTPATIENT
Start: 2021-06-11 | End: 2021-06-12 | Stop reason: HOSPADM

## 2021-06-10 RX ORDER — TRAZODONE HYDROCHLORIDE 100 MG/1
300 TABLET ORAL NIGHTLY
Status: DISCONTINUED | OUTPATIENT
Start: 2021-06-10 | End: 2021-06-12 | Stop reason: HOSPADM

## 2021-06-10 RX ORDER — SENNOSIDES 8.6 MG/1
2 TABLET ORAL 2 TIMES DAILY
Status: CANCELLED | OUTPATIENT
Start: 2021-06-10

## 2021-06-10 RX ORDER — AMOXICILLIN 250 MG
1 CAPSULE ORAL 2 TIMES DAILY
Status: DISCONTINUED | OUTPATIENT
Start: 2021-06-10 | End: 2021-06-12 | Stop reason: HOSPADM

## 2021-06-10 RX ORDER — BUTALBITAL, ACETAMINOPHEN AND CAFFEINE 50; 325; 40 MG/1; MG/1; MG/1
1 TABLET ORAL EVERY 4 HOURS PRN
Status: DISCONTINUED | OUTPATIENT
Start: 2021-06-10 | End: 2021-06-12 | Stop reason: HOSPADM

## 2021-06-10 RX ORDER — FLUOXETINE HYDROCHLORIDE 20 MG/1
60 CAPSULE ORAL DAILY
Status: CANCELLED | OUTPATIENT
Start: 2021-06-11

## 2021-06-10 RX ORDER — SODIUM CHLORIDE 9 MG/ML
INJECTION, SOLUTION INTRAVENOUS CONTINUOUS
Status: ACTIVE | OUTPATIENT
Start: 2021-06-10 | End: 2021-06-11

## 2021-06-10 RX ORDER — ATORVASTATIN CALCIUM 20 MG/1
80 TABLET, FILM COATED ORAL DAILY
Status: DISCONTINUED | OUTPATIENT
Start: 2021-06-11 | End: 2021-06-12 | Stop reason: HOSPADM

## 2021-06-10 RX ORDER — NALOXONE HYDROCHLORIDE 1 MG/ML
1 INJECTION INTRAMUSCULAR; INTRAVENOUS; SUBCUTANEOUS
Status: DISCONTINUED | OUTPATIENT
Start: 2021-06-10 | End: 2021-06-12 | Stop reason: HOSPADM

## 2021-06-10 RX ORDER — IBUPROFEN 200 MG
16 TABLET ORAL
Status: DISCONTINUED | OUTPATIENT
Start: 2021-06-10 | End: 2021-06-12 | Stop reason: HOSPADM

## 2021-06-10 RX ORDER — ATORVASTATIN CALCIUM 40 MG/1
80 TABLET, FILM COATED ORAL DAILY
Status: CANCELLED | OUTPATIENT
Start: 2021-06-11

## 2021-06-10 RX ORDER — DIPHENHYDRAMINE HYDROCHLORIDE 50 MG/ML
25 INJECTION INTRAMUSCULAR; INTRAVENOUS
Status: COMPLETED | OUTPATIENT
Start: 2021-06-10 | End: 2021-06-10

## 2021-06-10 RX ORDER — QUETIAPINE FUMARATE 200 MG/1
200 TABLET, FILM COATED ORAL NIGHTLY
Status: DISCONTINUED | OUTPATIENT
Start: 2021-06-10 | End: 2021-06-12 | Stop reason: HOSPADM

## 2021-06-10 RX ORDER — PANTOPRAZOLE SODIUM 40 MG/1
40 TABLET, DELAYED RELEASE ORAL DAILY
Status: CANCELLED | OUTPATIENT
Start: 2021-06-11

## 2021-06-10 RX ORDER — ONDANSETRON 8 MG/1
8 TABLET, ORALLY DISINTEGRATING ORAL EVERY 8 HOURS PRN
Status: DISCONTINUED | OUTPATIENT
Start: 2021-06-10 | End: 2021-06-11

## 2021-06-10 RX ORDER — TRAZODONE HYDROCHLORIDE 100 MG/1
300 TABLET ORAL NIGHTLY
Status: CANCELLED | OUTPATIENT
Start: 2021-06-10

## 2021-06-10 RX ORDER — PANTOPRAZOLE SODIUM 40 MG/1
40 TABLET, DELAYED RELEASE ORAL DAILY
Status: DISCONTINUED | OUTPATIENT
Start: 2021-06-11 | End: 2021-06-12 | Stop reason: HOSPADM

## 2021-06-10 RX ORDER — HYDROMORPHONE HYDROCHLORIDE 1 MG/ML
0.5 INJECTION, SOLUTION INTRAMUSCULAR; INTRAVENOUS; SUBCUTANEOUS EVERY 6 HOURS PRN
Status: DISCONTINUED | OUTPATIENT
Start: 2021-06-10 | End: 2021-06-12 | Stop reason: HOSPADM

## 2021-06-10 RX ORDER — LEVOFLOXACIN 750 MG/1
750 TABLET ORAL DAILY
Status: DISCONTINUED | OUTPATIENT
Start: 2021-06-11 | End: 2021-06-11

## 2021-06-10 RX ORDER — METHOCARBAMOL 500 MG/1
500 TABLET, FILM COATED ORAL 3 TIMES DAILY
Status: DISCONTINUED | OUTPATIENT
Start: 2021-06-10 | End: 2021-06-12

## 2021-06-10 RX ORDER — IBUPROFEN 200 MG
24 TABLET ORAL
Status: DISCONTINUED | OUTPATIENT
Start: 2021-06-10 | End: 2021-06-12 | Stop reason: HOSPADM

## 2021-06-10 RX ORDER — OXYCODONE HYDROCHLORIDE 5 MG/1
5 TABLET ORAL EVERY 6 HOURS PRN
Status: DISCONTINUED | OUTPATIENT
Start: 2021-06-10 | End: 2021-06-12 | Stop reason: HOSPADM

## 2021-06-10 RX ORDER — GLUCAGON 1 MG
1 KIT INJECTION
Status: DISCONTINUED | OUTPATIENT
Start: 2021-06-10 | End: 2021-06-12 | Stop reason: HOSPADM

## 2021-06-10 RX ORDER — LEVOTHYROXINE SODIUM 125 UG/1
125 TABLET ORAL
Status: DISCONTINUED | OUTPATIENT
Start: 2021-06-11 | End: 2021-06-12 | Stop reason: HOSPADM

## 2021-06-10 RX ORDER — QUETIAPINE FUMARATE 200 MG/1
200 TABLET, FILM COATED ORAL NIGHTLY
Status: CANCELLED | OUTPATIENT
Start: 2021-06-10

## 2021-06-10 RX ORDER — TALC
6 POWDER (GRAM) TOPICAL NIGHTLY PRN
Status: CANCELLED | OUTPATIENT
Start: 2021-06-10

## 2021-06-10 RX ADMIN — TRAZODONE HYDROCHLORIDE 300 MG: 100 TABLET ORAL at 10:06

## 2021-06-10 RX ADMIN — OXYCODONE 5 MG: 5 TABLET ORAL at 07:06

## 2021-06-10 RX ADMIN — BENZTROPINE MESYLATE 1 MG: 1 INJECTION INTRAMUSCULAR; INTRAVENOUS at 03:06

## 2021-06-10 RX ADMIN — QUETIAPINE FUMARATE 200 MG: 200 TABLET ORAL at 10:06

## 2021-06-10 RX ADMIN — SODIUM CHLORIDE 1000 ML: 0.9 INJECTION, SOLUTION INTRAVENOUS at 03:06

## 2021-06-10 RX ADMIN — BENZTROPINE MESYLATE 1 MG: 1 INJECTION INTRAMUSCULAR; INTRAVENOUS at 04:06

## 2021-06-10 RX ADMIN — DIPHENHYDRAMINE HYDROCHLORIDE 25 MG: 50 INJECTION, SOLUTION INTRAMUSCULAR; INTRAVENOUS at 03:06

## 2021-06-10 RX ADMIN — SODIUM CHLORIDE: 0.9 INJECTION, SOLUTION INTRAVENOUS at 10:06

## 2021-06-10 RX ADMIN — DOCUSATE SODIUM 50MG AND SENNOSIDES 8.6MG 1 TABLET: 8.6; 5 TABLET, FILM COATED ORAL at 10:06

## 2021-06-10 RX ADMIN — METHOCARBAMOL 500 MG: 500 TABLET ORAL at 10:06

## 2021-06-10 RX ADMIN — DIPHENHYDRAMINE HYDROCHLORIDE 25 MG: 50 INJECTION, SOLUTION INTRAMUSCULAR; INTRAVENOUS at 04:06

## 2021-06-11 LAB
ALBUMIN SERPL BCP-MCNC: 2.5 G/DL (ref 3.5–5.2)
ALP SERPL-CCNC: 113 U/L (ref 55–135)
ALT SERPL W/O P-5'-P-CCNC: 6 U/L (ref 10–44)
ANION GAP SERPL CALC-SCNC: 8 MMOL/L (ref 8–16)
AST SERPL-CCNC: 10 U/L (ref 10–40)
BASOPHILS # BLD AUTO: 0.02 K/UL (ref 0–0.2)
BASOPHILS NFR BLD: 0.4 % (ref 0–1.9)
BILIRUB SERPL-MCNC: 0.2 MG/DL (ref 0.1–1)
BUN SERPL-MCNC: 11 MG/DL (ref 8–23)
CALCIUM SERPL-MCNC: 9 MG/DL (ref 8.7–10.5)
CHLORIDE SERPL-SCNC: 100 MMOL/L (ref 95–110)
CO2 SERPL-SCNC: 32 MMOL/L (ref 23–29)
CREAT SERPL-MCNC: 1.1 MG/DL (ref 0.5–1.4)
DIFFERENTIAL METHOD: ABNORMAL
EOSINOPHIL # BLD AUTO: 0.3 K/UL (ref 0–0.5)
EOSINOPHIL NFR BLD: 4.7 % (ref 0–8)
ERYTHROCYTE [DISTWIDTH] IN BLOOD BY AUTOMATED COUNT: 13.5 % (ref 11.5–14.5)
EST. GFR  (AFRICAN AMERICAN): >60 ML/MIN/1.73 M^2
EST. GFR  (NON AFRICAN AMERICAN): 53.2 ML/MIN/1.73 M^2
GLUCOSE SERPL-MCNC: 127 MG/DL (ref 70–110)
HCT VFR BLD AUTO: 30.8 % (ref 37–48.5)
HGB BLD-MCNC: 9.3 G/DL (ref 12–16)
IMM GRANULOCYTES # BLD AUTO: 0.06 K/UL (ref 0–0.04)
IMM GRANULOCYTES NFR BLD AUTO: 1.1 % (ref 0–0.5)
LYMPHOCYTES # BLD AUTO: 1.7 K/UL (ref 1–4.8)
LYMPHOCYTES NFR BLD: 31 % (ref 18–48)
MAGNESIUM SERPL-MCNC: 2.1 MG/DL (ref 1.6–2.6)
MCH RBC QN AUTO: 27.5 PG (ref 27–31)
MCHC RBC AUTO-ENTMCNC: 30.2 G/DL (ref 32–36)
MCV RBC AUTO: 91 FL (ref 82–98)
MONOCYTES # BLD AUTO: 0.5 K/UL (ref 0.3–1)
MONOCYTES NFR BLD: 8.9 % (ref 4–15)
NEUTROPHILS # BLD AUTO: 3 K/UL (ref 1.8–7.7)
NEUTROPHILS NFR BLD: 53.9 % (ref 38–73)
NRBC BLD-RTO: 0 /100 WBC
PHOSPHATE SERPL-MCNC: 3.5 MG/DL (ref 2.7–4.5)
PLATELET # BLD AUTO: 271 K/UL (ref 150–450)
PMV BLD AUTO: 9.5 FL (ref 9.2–12.9)
POTASSIUM SERPL-SCNC: 3.8 MMOL/L (ref 3.5–5.1)
PROT SERPL-MCNC: 6 G/DL (ref 6–8.4)
RBC # BLD AUTO: 3.38 M/UL (ref 4–5.4)
SODIUM SERPL-SCNC: 140 MMOL/L (ref 136–145)
WBC # BLD AUTO: 5.48 K/UL (ref 3.9–12.7)

## 2021-06-11 PROCEDURE — 96361 HYDRATE IV INFUSION ADD-ON: CPT

## 2021-06-11 PROCEDURE — G0378 HOSPITAL OBSERVATION PER HR: HCPCS

## 2021-06-11 PROCEDURE — 97161 PT EVAL LOW COMPLEX 20 MIN: CPT

## 2021-06-11 PROCEDURE — 94664 DEMO&/EVAL PT USE INHALER: CPT

## 2021-06-11 PROCEDURE — 83735 ASSAY OF MAGNESIUM: CPT | Performed by: STUDENT IN AN ORGANIZED HEALTH CARE EDUCATION/TRAINING PROGRAM

## 2021-06-11 PROCEDURE — 96375 TX/PRO/DX INJ NEW DRUG ADDON: CPT

## 2021-06-11 PROCEDURE — 96372 THER/PROPH/DIAG INJ SC/IM: CPT | Mod: 59

## 2021-06-11 PROCEDURE — 99226 PR SUBSEQUENT OBSERVATION CARE,LEVEL III: ICD-10-PCS | Mod: GC,,, | Performed by: HOSPITALIST

## 2021-06-11 PROCEDURE — 85025 COMPLETE CBC W/AUTO DIFF WBC: CPT | Performed by: STUDENT IN AN ORGANIZED HEALTH CARE EDUCATION/TRAINING PROGRAM

## 2021-06-11 PROCEDURE — 84100 ASSAY OF PHOSPHORUS: CPT | Performed by: STUDENT IN AN ORGANIZED HEALTH CARE EDUCATION/TRAINING PROGRAM

## 2021-06-11 PROCEDURE — 25000003 PHARM REV CODE 250: Performed by: STUDENT IN AN ORGANIZED HEALTH CARE EDUCATION/TRAINING PROGRAM

## 2021-06-11 PROCEDURE — 97116 GAIT TRAINING THERAPY: CPT

## 2021-06-11 PROCEDURE — 93005 ELECTROCARDIOGRAM TRACING: CPT

## 2021-06-11 PROCEDURE — 93010 ELECTROCARDIOGRAM REPORT: CPT | Mod: ,,, | Performed by: INTERNAL MEDICINE

## 2021-06-11 PROCEDURE — 99204 OFFICE O/P NEW MOD 45 MIN: CPT | Mod: GC,,, | Performed by: PSYCHIATRY & NEUROLOGY

## 2021-06-11 PROCEDURE — 99900035 HC TECH TIME PER 15 MIN (STAT)

## 2021-06-11 PROCEDURE — 97165 OT EVAL LOW COMPLEX 30 MIN: CPT

## 2021-06-11 PROCEDURE — 36415 COLL VENOUS BLD VENIPUNCTURE: CPT | Performed by: STUDENT IN AN ORGANIZED HEALTH CARE EDUCATION/TRAINING PROGRAM

## 2021-06-11 PROCEDURE — 27000221 HC OXYGEN, UP TO 24 HOURS

## 2021-06-11 PROCEDURE — 94660 CPAP INITIATION&MGMT: CPT

## 2021-06-11 PROCEDURE — 25000242 PHARM REV CODE 250 ALT 637 W/ HCPCS: Performed by: HOSPITALIST

## 2021-06-11 PROCEDURE — 80053 COMPREHEN METABOLIC PANEL: CPT | Performed by: STUDENT IN AN ORGANIZED HEALTH CARE EDUCATION/TRAINING PROGRAM

## 2021-06-11 PROCEDURE — 63600175 PHARM REV CODE 636 W HCPCS: Performed by: STUDENT IN AN ORGANIZED HEALTH CARE EDUCATION/TRAINING PROGRAM

## 2021-06-11 PROCEDURE — 99226 PR SUBSEQUENT OBSERVATION CARE,LEVEL III: CPT | Mod: GC,,, | Performed by: HOSPITALIST

## 2021-06-11 PROCEDURE — 94640 AIRWAY INHALATION TREATMENT: CPT

## 2021-06-11 PROCEDURE — 93010 EKG 12-LEAD: ICD-10-PCS | Mod: ,,, | Performed by: INTERNAL MEDICINE

## 2021-06-11 PROCEDURE — 97530 THERAPEUTIC ACTIVITIES: CPT

## 2021-06-11 PROCEDURE — 94761 N-INVAS EAR/PLS OXIMETRY MLT: CPT

## 2021-06-11 PROCEDURE — 27000190 HC CPAP FULL FACE MASK W/VALVE

## 2021-06-11 PROCEDURE — 99204 PR OFFICE/OUTPT VISIT, NEW, LEVL IV, 45-59 MIN: ICD-10-PCS | Mod: GC,,, | Performed by: PSYCHIATRY & NEUROLOGY

## 2021-06-11 RX ORDER — CYANOCOBALAMIN 1000 UG/ML
1000 INJECTION, SOLUTION INTRAMUSCULAR; SUBCUTANEOUS DAILY
Status: DISCONTINUED | OUTPATIENT
Start: 2021-06-11 | End: 2021-06-12 | Stop reason: HOSPADM

## 2021-06-11 RX ORDER — LIDOCAINE 50 MG/G
1 PATCH TOPICAL
Status: DISCONTINUED | OUTPATIENT
Start: 2021-06-11 | End: 2021-06-12 | Stop reason: HOSPADM

## 2021-06-11 RX ORDER — GUAIFENESIN 600 MG/1
600 TABLET, EXTENDED RELEASE ORAL 2 TIMES DAILY
Status: DISCONTINUED | OUTPATIENT
Start: 2021-06-11 | End: 2021-06-12 | Stop reason: HOSPADM

## 2021-06-11 RX ORDER — LORAZEPAM 0.5 MG/1
0.5 TABLET ORAL EVERY 6 HOURS PRN
Status: DISCONTINUED | OUTPATIENT
Start: 2021-06-11 | End: 2021-06-11

## 2021-06-11 RX ORDER — IPRATROPIUM BROMIDE AND ALBUTEROL SULFATE 2.5; .5 MG/3ML; MG/3ML
3 SOLUTION RESPIRATORY (INHALATION) EVERY 6 HOURS
Status: DISCONTINUED | OUTPATIENT
Start: 2021-06-11 | End: 2021-06-11

## 2021-06-11 RX ORDER — ONDANSETRON 2 MG/ML
4 INJECTION INTRAMUSCULAR; INTRAVENOUS EVERY 6 HOURS PRN
Status: DISCONTINUED | OUTPATIENT
Start: 2021-06-11 | End: 2021-06-12 | Stop reason: HOSPADM

## 2021-06-11 RX ORDER — IPRATROPIUM BROMIDE AND ALBUTEROL SULFATE 2.5; .5 MG/3ML; MG/3ML
3 SOLUTION RESPIRATORY (INHALATION)
Status: DISCONTINUED | OUTPATIENT
Start: 2021-06-11 | End: 2021-06-12

## 2021-06-11 RX ORDER — KETOROLAC TROMETHAMINE 30 MG/ML
30 INJECTION, SOLUTION INTRAMUSCULAR; INTRAVENOUS ONCE
Status: COMPLETED | OUTPATIENT
Start: 2021-06-11 | End: 2021-06-11

## 2021-06-11 RX ORDER — TORSEMIDE 100 MG/1
100 TABLET ORAL DAILY
Status: DISCONTINUED | OUTPATIENT
Start: 2021-06-11 | End: 2021-06-12 | Stop reason: HOSPADM

## 2021-06-11 RX ORDER — LORAZEPAM 2 MG/ML
0.5 INJECTION INTRAMUSCULAR ONCE
Status: DISCONTINUED | OUTPATIENT
Start: 2021-06-11 | End: 2021-06-11

## 2021-06-11 RX ORDER — CYANOCOBALAMIN (VITAMIN B-12) 250 MCG
250 TABLET ORAL DAILY
Status: DISCONTINUED | OUTPATIENT
Start: 2021-06-11 | End: 2021-06-11

## 2021-06-11 RX ORDER — ONDANSETRON 2 MG/ML
8 INJECTION INTRAMUSCULAR; INTRAVENOUS ONCE
Status: COMPLETED | OUTPATIENT
Start: 2021-06-11 | End: 2021-06-11

## 2021-06-11 RX ORDER — ENOXAPARIN SODIUM 100 MG/ML
40 INJECTION SUBCUTANEOUS EVERY 24 HOURS
Status: DISCONTINUED | OUTPATIENT
Start: 2021-06-11 | End: 2021-06-12 | Stop reason: HOSPADM

## 2021-06-11 RX ORDER — LEVOFLOXACIN 5 MG/ML
750 INJECTION, SOLUTION INTRAVENOUS
Status: COMPLETED | OUTPATIENT
Start: 2021-06-12 | End: 2021-06-12

## 2021-06-11 RX ADMIN — ATORVASTATIN CALCIUM 80 MG: 20 TABLET, FILM COATED ORAL at 10:06

## 2021-06-11 RX ADMIN — ONDANSETRON 8 MG: 2 INJECTION INTRAMUSCULAR; INTRAVENOUS at 05:06

## 2021-06-11 RX ADMIN — QUETIAPINE FUMARATE 200 MG: 200 TABLET ORAL at 09:06

## 2021-06-11 RX ADMIN — PANTOPRAZOLE SODIUM 40 MG: 40 TABLET, DELAYED RELEASE ORAL at 10:06

## 2021-06-11 RX ADMIN — METHOCARBAMOL 500 MG: 500 TABLET ORAL at 09:06

## 2021-06-11 RX ADMIN — IPRATROPIUM BROMIDE AND ALBUTEROL SULFATE 3 ML: .5; 2.5 SOLUTION RESPIRATORY (INHALATION) at 08:06

## 2021-06-11 RX ADMIN — GUAIFENESIN 600 MG: 600 TABLET, EXTENDED RELEASE ORAL at 03:06

## 2021-06-11 RX ADMIN — OXYCODONE 5 MG: 5 TABLET ORAL at 03:06

## 2021-06-11 RX ADMIN — CYANOCOBALAMIN 1000 MCG: 1000 INJECTION, SOLUTION INTRAMUSCULAR; SUBCUTANEOUS at 03:06

## 2021-06-11 RX ADMIN — POLYETHYLENE GLYCOL 3350 17 G: 17 POWDER, FOR SOLUTION ORAL at 10:06

## 2021-06-11 RX ADMIN — METHOCARBAMOL 500 MG: 500 TABLET ORAL at 03:06

## 2021-06-11 RX ADMIN — DOCUSATE SODIUM 50MG AND SENNOSIDES 8.6MG 1 TABLET: 8.6; 5 TABLET, FILM COATED ORAL at 10:06

## 2021-06-11 RX ADMIN — ASPIRIN 81 MG: 81 TABLET, CHEWABLE ORAL at 10:06

## 2021-06-11 RX ADMIN — LIDOCAINE 1 PATCH: 50 PATCH TOPICAL at 03:06

## 2021-06-11 RX ADMIN — TRAZODONE HYDROCHLORIDE 300 MG: 100 TABLET ORAL at 09:06

## 2021-06-11 RX ADMIN — LEVOTHYROXINE SODIUM 125 MCG: 125 TABLET ORAL at 06:06

## 2021-06-11 RX ADMIN — HYDROMORPHONE HYDROCHLORIDE 0.5 MG: 1 INJECTION, SOLUTION INTRAMUSCULAR; INTRAVENOUS; SUBCUTANEOUS at 06:06

## 2021-06-11 RX ADMIN — ENOXAPARIN SODIUM 40 MG: 40 INJECTION SUBCUTANEOUS at 05:06

## 2021-06-11 RX ADMIN — FLUOXETINE 40 MG: 20 CAPSULE ORAL at 10:06

## 2021-06-11 RX ADMIN — LEVOFLOXACIN 750 MG: 750 TABLET, FILM COATED ORAL at 10:06

## 2021-06-11 RX ADMIN — KETOROLAC TROMETHAMINE 30 MG: 30 INJECTION, SOLUTION INTRAMUSCULAR; INTRAVENOUS at 05:06

## 2021-06-11 RX ADMIN — IPRATROPIUM BROMIDE AND ALBUTEROL SULFATE 3 ML: .5; 2.5 SOLUTION RESPIRATORY (INHALATION) at 04:06

## 2021-06-11 RX ADMIN — DOCUSATE SODIUM 50MG AND SENNOSIDES 8.6MG 1 TABLET: 8.6; 5 TABLET, FILM COATED ORAL at 09:06

## 2021-06-12 VITALS
HEIGHT: 64 IN | HEART RATE: 62 BPM | TEMPERATURE: 98 F | WEIGHT: 190.25 LBS | RESPIRATION RATE: 18 BRPM | DIASTOLIC BLOOD PRESSURE: 57 MMHG | SYSTOLIC BLOOD PRESSURE: 110 MMHG | BODY MASS INDEX: 32.48 KG/M2 | OXYGEN SATURATION: 99 %

## 2021-06-12 LAB
ALBUMIN SERPL BCP-MCNC: 2.4 G/DL (ref 3.5–5.2)
ALP SERPL-CCNC: 104 U/L (ref 55–135)
ALT SERPL W/O P-5'-P-CCNC: 5 U/L (ref 10–44)
ANION GAP SERPL CALC-SCNC: 8 MMOL/L (ref 8–16)
AST SERPL-CCNC: 10 U/L (ref 10–40)
BASOPHILS # BLD AUTO: 0.02 K/UL (ref 0–0.2)
BASOPHILS NFR BLD: 0.4 % (ref 0–1.9)
BILIRUB SERPL-MCNC: 0.2 MG/DL (ref 0.1–1)
BUN SERPL-MCNC: 10 MG/DL (ref 8–23)
CALCIUM SERPL-MCNC: 9 MG/DL (ref 8.7–10.5)
CHLORIDE SERPL-SCNC: 102 MMOL/L (ref 95–110)
CO2 SERPL-SCNC: 29 MMOL/L (ref 23–29)
CREAT SERPL-MCNC: 0.9 MG/DL (ref 0.5–1.4)
DIFFERENTIAL METHOD: ABNORMAL
EOSINOPHIL # BLD AUTO: 0.2 K/UL (ref 0–0.5)
EOSINOPHIL NFR BLD: 3.5 % (ref 0–8)
ERYTHROCYTE [DISTWIDTH] IN BLOOD BY AUTOMATED COUNT: 13.8 % (ref 11.5–14.5)
EST. GFR  (AFRICAN AMERICAN): >60 ML/MIN/1.73 M^2
EST. GFR  (NON AFRICAN AMERICAN): >60 ML/MIN/1.73 M^2
FOLATE SERPL-MCNC: 4.9 NG/ML (ref 4–24)
GLUCOSE SERPL-MCNC: 89 MG/DL (ref 70–110)
HCT VFR BLD AUTO: 31 % (ref 37–48.5)
HGB BLD-MCNC: 9.4 G/DL (ref 12–16)
IMM GRANULOCYTES # BLD AUTO: 0.02 K/UL (ref 0–0.04)
IMM GRANULOCYTES NFR BLD AUTO: 0.4 % (ref 0–0.5)
LYMPHOCYTES # BLD AUTO: 1.3 K/UL (ref 1–4.8)
LYMPHOCYTES NFR BLD: 27.6 % (ref 18–48)
MAGNESIUM SERPL-MCNC: 2.2 MG/DL (ref 1.6–2.6)
MCH RBC QN AUTO: 27.2 PG (ref 27–31)
MCHC RBC AUTO-ENTMCNC: 30.3 G/DL (ref 32–36)
MCV RBC AUTO: 90 FL (ref 82–98)
MONOCYTES # BLD AUTO: 0.4 K/UL (ref 0.3–1)
MONOCYTES NFR BLD: 7.7 % (ref 4–15)
NEUTROPHILS # BLD AUTO: 2.8 K/UL (ref 1.8–7.7)
NEUTROPHILS NFR BLD: 60.4 % (ref 38–73)
NRBC BLD-RTO: 0 /100 WBC
PHOSPHATE SERPL-MCNC: 3.3 MG/DL (ref 2.7–4.5)
PLATELET # BLD AUTO: 262 K/UL (ref 150–450)
PMV BLD AUTO: 9.6 FL (ref 9.2–12.9)
POTASSIUM SERPL-SCNC: 4.3 MMOL/L (ref 3.5–5.1)
PROT SERPL-MCNC: 5.9 G/DL (ref 6–8.4)
RBC # BLD AUTO: 3.45 M/UL (ref 4–5.4)
SODIUM SERPL-SCNC: 139 MMOL/L (ref 136–145)
WBC # BLD AUTO: 4.56 K/UL (ref 3.9–12.7)

## 2021-06-12 PROCEDURE — 25000242 PHARM REV CODE 250 ALT 637 W/ HCPCS: Performed by: HOSPITALIST

## 2021-06-12 PROCEDURE — 94761 N-INVAS EAR/PLS OXIMETRY MLT: CPT

## 2021-06-12 PROCEDURE — 94664 DEMO&/EVAL PT USE INHALER: CPT

## 2021-06-12 PROCEDURE — 96372 THER/PROPH/DIAG INJ SC/IM: CPT | Mod: 59

## 2021-06-12 PROCEDURE — 36415 COLL VENOUS BLD VENIPUNCTURE: CPT | Performed by: PSYCHIATRY & NEUROLOGY

## 2021-06-12 PROCEDURE — 25000242 PHARM REV CODE 250 ALT 637 W/ HCPCS: Performed by: STUDENT IN AN ORGANIZED HEALTH CARE EDUCATION/TRAINING PROGRAM

## 2021-06-12 PROCEDURE — 86592 SYPHILIS TEST NON-TREP QUAL: CPT | Performed by: STUDENT IN AN ORGANIZED HEALTH CARE EDUCATION/TRAINING PROGRAM

## 2021-06-12 PROCEDURE — 94640 AIRWAY INHALATION TREATMENT: CPT

## 2021-06-12 PROCEDURE — 86341 ISLET CELL ANTIBODY: CPT | Performed by: PSYCHIATRY & NEUROLOGY

## 2021-06-12 PROCEDURE — 85025 COMPLETE CBC W/AUTO DIFF WBC: CPT | Performed by: STUDENT IN AN ORGANIZED HEALTH CARE EDUCATION/TRAINING PROGRAM

## 2021-06-12 PROCEDURE — 82746 ASSAY OF FOLIC ACID SERUM: CPT | Performed by: HOSPITALIST

## 2021-06-12 PROCEDURE — 96375 TX/PRO/DX INJ NEW DRUG ADDON: CPT

## 2021-06-12 PROCEDURE — 25000003 PHARM REV CODE 250: Performed by: STUDENT IN AN ORGANIZED HEALTH CARE EDUCATION/TRAINING PROGRAM

## 2021-06-12 PROCEDURE — 99900035 HC TECH TIME PER 15 MIN (STAT)

## 2021-06-12 PROCEDURE — G0378 HOSPITAL OBSERVATION PER HR: HCPCS

## 2021-06-12 PROCEDURE — 99217 PR OBSERVATION CARE DISCHARGE: CPT | Mod: GC,,, | Performed by: HOSPITALIST

## 2021-06-12 PROCEDURE — 96376 TX/PRO/DX INJ SAME DRUG ADON: CPT

## 2021-06-12 PROCEDURE — 83735 ASSAY OF MAGNESIUM: CPT | Performed by: STUDENT IN AN ORGANIZED HEALTH CARE EDUCATION/TRAINING PROGRAM

## 2021-06-12 PROCEDURE — 84100 ASSAY OF PHOSPHORUS: CPT | Performed by: STUDENT IN AN ORGANIZED HEALTH CARE EDUCATION/TRAINING PROGRAM

## 2021-06-12 PROCEDURE — 27000221 HC OXYGEN, UP TO 24 HOURS

## 2021-06-12 PROCEDURE — 63600175 PHARM REV CODE 636 W HCPCS: Performed by: STUDENT IN AN ORGANIZED HEALTH CARE EDUCATION/TRAINING PROGRAM

## 2021-06-12 PROCEDURE — 99217 PR OBSERVATION CARE DISCHARGE: ICD-10-PCS | Mod: GC,,, | Performed by: HOSPITALIST

## 2021-06-12 PROCEDURE — 80053 COMPREHEN METABOLIC PANEL: CPT | Performed by: STUDENT IN AN ORGANIZED HEALTH CARE EDUCATION/TRAINING PROGRAM

## 2021-06-12 PROCEDURE — 36415 COLL VENOUS BLD VENIPUNCTURE: CPT | Performed by: HOSPITALIST

## 2021-06-12 PROCEDURE — 86255 FLUORESCENT ANTIBODY SCREEN: CPT | Mod: 59 | Performed by: PSYCHIATRY & NEUROLOGY

## 2021-06-12 RX ORDER — BACLOFEN 5 MG/1
5 TABLET ORAL ONCE
Status: COMPLETED | OUTPATIENT
Start: 2021-06-12 | End: 2021-06-12

## 2021-06-12 RX ORDER — UBIDECARENONE 75 MG
1000 CAPSULE ORAL DAILY
Qty: 30 TABLET | Refills: 0 | Status: SHIPPED | OUTPATIENT
Start: 2021-06-12 | End: 2021-11-15 | Stop reason: CLARIF

## 2021-06-12 RX ORDER — IPRATROPIUM BROMIDE AND ALBUTEROL SULFATE 2.5; .5 MG/3ML; MG/3ML
3 SOLUTION RESPIRATORY (INHALATION)
Status: DISCONTINUED | OUTPATIENT
Start: 2021-06-12 | End: 2021-06-12 | Stop reason: HOSPADM

## 2021-06-12 RX ORDER — BACLOFEN 5 MG/1
5 TABLET ORAL 3 TIMES DAILY
Qty: 21 TABLET | Refills: 0 | Status: SHIPPED | OUTPATIENT
Start: 2021-06-12 | End: 2022-02-03

## 2021-06-12 RX ADMIN — PANTOPRAZOLE SODIUM 40 MG: 40 TABLET, DELAYED RELEASE ORAL at 02:06

## 2021-06-12 RX ADMIN — LEVOFLOXACIN 750 MG: 750 INJECTION, SOLUTION INTRAVENOUS at 11:06

## 2021-06-12 RX ADMIN — IPRATROPIUM BROMIDE AND ALBUTEROL SULFATE 3 ML: .5; 2.5 SOLUTION RESPIRATORY (INHALATION) at 07:06

## 2021-06-12 RX ADMIN — LEVOTHYROXINE SODIUM 125 MCG: 125 TABLET ORAL at 06:06

## 2021-06-12 RX ADMIN — CYANOCOBALAMIN 1000 MCG: 1000 INJECTION, SOLUTION INTRAMUSCULAR; SUBCUTANEOUS at 02:06

## 2021-06-12 RX ADMIN — BACLOFEN 5 MG: 5 TABLET ORAL at 02:06

## 2021-06-12 RX ADMIN — HYDROMORPHONE HYDROCHLORIDE 0.5 MG: 1 INJECTION, SOLUTION INTRAMUSCULAR; INTRAVENOUS; SUBCUTANEOUS at 01:06

## 2021-06-12 RX ADMIN — TORSEMIDE 100 MG: 100 TABLET ORAL at 02:06

## 2021-06-12 RX ADMIN — POLYETHYLENE GLYCOL 3350 17 G: 17 POWDER, FOR SOLUTION ORAL at 02:06

## 2021-06-12 RX ADMIN — FLUOXETINE 40 MG: 20 CAPSULE ORAL at 02:06

## 2021-06-12 RX ADMIN — IPRATROPIUM BROMIDE AND ALBUTEROL SULFATE 3 ML: .5; 2.5 SOLUTION RESPIRATORY (INHALATION) at 12:06

## 2021-06-12 RX ADMIN — ATORVASTATIN CALCIUM 80 MG: 20 TABLET, FILM COATED ORAL at 02:06

## 2021-06-12 RX ADMIN — ASPIRIN 81 MG: 81 TABLET, CHEWABLE ORAL at 02:06

## 2021-06-12 RX ADMIN — GUAIFENESIN 600 MG: 600 TABLET, EXTENDED RELEASE ORAL at 02:06

## 2021-06-12 RX ADMIN — DOCUSATE SODIUM 50MG AND SENNOSIDES 8.6MG 1 TABLET: 8.6; 5 TABLET, FILM COATED ORAL at 02:06

## 2021-06-14 ENCOUNTER — TELEPHONE (OUTPATIENT)
Dept: NEUROLOGY | Facility: CLINIC | Age: 66
End: 2021-06-14

## 2021-06-14 LAB — RPR SER QL: NORMAL

## 2021-06-14 NOTE — TELEPHONE ENCOUNTER
----- Message from Van Hamilton MD sent at 6/11/2021  5:37 PM CDT -----  Hello,    Please have this patient follow up with Dr Carter in clinic  Please let me know if I can help in any way or if I need to message someone else for this    Thanks  Van

## 2021-06-15 LAB
BACTERIA BLD CULT: NORMAL
BACTERIA BLD CULT: NORMAL

## 2021-06-17 ENCOUNTER — DOCUMENT SCAN (OUTPATIENT)
Dept: HOME HEALTH SERVICES | Facility: HOSPITAL | Age: 66
End: 2021-06-17
Payer: MEDICAID

## 2021-06-18 ENCOUNTER — OFFICE VISIT (OUTPATIENT)
Dept: INTERNAL MEDICINE | Facility: CLINIC | Age: 66
End: 2021-06-18
Payer: MEDICARE

## 2021-06-18 VITALS
HEART RATE: 90 BPM | HEIGHT: 64 IN | SYSTOLIC BLOOD PRESSURE: 90 MMHG | BODY MASS INDEX: 32.48 KG/M2 | DIASTOLIC BLOOD PRESSURE: 58 MMHG | WEIGHT: 190.25 LBS | OXYGEN SATURATION: 97 %

## 2021-06-18 DIAGNOSIS — I89.0 LYMPHEDEMA OF BOTH LOWER EXTREMITIES: Chronic | ICD-10-CM

## 2021-06-18 DIAGNOSIS — F41.1 GENERALIZED ANXIETY DISORDER: ICD-10-CM

## 2021-06-18 DIAGNOSIS — N39.0 RECURRENT UTI (URINARY TRACT INFECTION): ICD-10-CM

## 2021-06-18 DIAGNOSIS — N31.9 NEUROGENIC BLADDER: Chronic | ICD-10-CM

## 2021-06-18 DIAGNOSIS — Z74.09 IMPAIRED FUNCTIONAL MOBILITY AND ACTIVITY TOLERANCE: ICD-10-CM

## 2021-06-18 DIAGNOSIS — J18.9 COMMUNITY ACQUIRED PNEUMONIA, UNSPECIFIED LATERALITY: Primary | ICD-10-CM

## 2021-06-18 DIAGNOSIS — G89.4 CHRONIC PAIN SYNDROME: Chronic | ICD-10-CM

## 2021-06-18 PROBLEM — E78.00 PURE HYPERCHOLESTEROLEMIA: Status: RESOLVED | Noted: 2020-01-13 | Resolved: 2021-06-18

## 2021-06-18 PROBLEM — R10.32 LEFT LOWER QUADRANT ABDOMINAL PAIN: Status: RESOLVED | Noted: 2020-01-27 | Resolved: 2021-06-18

## 2021-06-18 PROBLEM — R53.1 LEFT-SIDED WEAKNESS: Status: RESOLVED | Noted: 2021-01-14 | Resolved: 2021-06-18

## 2021-06-18 PROBLEM — I87.2 CHRONIC VENOUS INSUFFICIENCY: Status: RESOLVED | Noted: 2019-05-23 | Resolved: 2021-06-18

## 2021-06-18 PROBLEM — I95.9 HYPOTENSION: Status: RESOLVED | Noted: 2020-05-16 | Resolved: 2021-06-18

## 2021-06-18 PROBLEM — N17.9 AKI (ACUTE KIDNEY INJURY): Status: RESOLVED | Noted: 2018-06-08 | Resolved: 2021-06-18

## 2021-06-18 PROBLEM — I63.9 CVA (CEREBRAL VASCULAR ACCIDENT): Status: RESOLVED | Noted: 2021-01-13 | Resolved: 2021-06-18

## 2021-06-18 PROBLEM — R29.898 WEAKNESS OF EXTREMITY: Status: RESOLVED | Noted: 2021-01-13 | Resolved: 2021-06-18

## 2021-06-18 PROBLEM — R10.9 LEFT SIDED ABDOMINAL PAIN: Status: RESOLVED | Noted: 2020-02-05 | Resolved: 2021-06-18

## 2021-06-18 PROBLEM — R25.2 SPASMS OF THE HANDS OR FEET: Status: RESOLVED | Noted: 2021-06-10 | Resolved: 2021-06-18

## 2021-06-18 LAB
AMPHIPHYSIN AB TITR SER: NEGATIVE TITER
CASPR2-IGG CBA: NEGATIVE
CV2 IGG SER QL IB: NEGATIVE
CV2 IGG TITR SER: NEGATIVE TITER
DPPX IGG SERPL QL IF: NEGATIVE
GAD65 AB SER-SCNC: 0 NMOL/L
GLIAL NUC TYPE 1 AB TITR SER: NEGATIVE TITER
GRAF1 IFA,S: NEGATIVE
HU1 AB TITR SER: NEGATIVE TITER
HU2 AB TITR SER IF: NEGATIVE TITER
HU3 AB TITR SER: NEGATIVE TITER
IGLON5 IFA, S: NEGATIVE
IMMUNOLOGIST REVIEW: NORMAL
ITPR1 IFA, S: NEGATIVE
LGI1-IGG CBA: NEGATIVE
MGLUR1 AB IFA, SERUM: NEGATIVE
NIF IFA, S: NEGATIVE
NMDA-R AB CBA, SERUM: NEGATIVE
PAVAL REFLEX TEST ADDED: NORMAL
PCA-1 AB TITR SER: NEGATIVE TITER
PCA-2 AB TITR SER: NEGATIVE TITER
PCA-TR AB TITR SER: NEGATIVE TITER
VGCC-P/Q BIND AB SER-SCNC: 0.01 NMOL/L

## 2021-06-18 PROCEDURE — 99999 PR PBB SHADOW E&M-EST. PATIENT-LVL V: ICD-10-PCS | Mod: PBBFAC,,, | Performed by: INTERNAL MEDICINE

## 2021-06-18 PROCEDURE — 99999 PR PBB SHADOW E&M-EST. PATIENT-LVL V: CPT | Mod: PBBFAC,,, | Performed by: INTERNAL MEDICINE

## 2021-06-18 PROCEDURE — 3008F PR BODY MASS INDEX (BMI) DOCUMENTED: ICD-10-PCS | Mod: CPTII,S$GLB,, | Performed by: INTERNAL MEDICINE

## 2021-06-18 PROCEDURE — 99214 OFFICE O/P EST MOD 30 MIN: CPT | Mod: S$GLB,,, | Performed by: INTERNAL MEDICINE

## 2021-06-18 PROCEDURE — 1125F AMNT PAIN NOTED PAIN PRSNT: CPT | Mod: S$GLB,,, | Performed by: INTERNAL MEDICINE

## 2021-06-18 PROCEDURE — 99214 PR OFFICE/OUTPT VISIT, EST, LEVL IV, 30-39 MIN: ICD-10-PCS | Mod: S$GLB,,, | Performed by: INTERNAL MEDICINE

## 2021-06-18 PROCEDURE — 3008F BODY MASS INDEX DOCD: CPT | Mod: CPTII,S$GLB,, | Performed by: INTERNAL MEDICINE

## 2021-06-18 PROCEDURE — 1125F PR PAIN SEVERITY QUANTIFIED, PAIN PRESENT: ICD-10-PCS | Mod: S$GLB,,, | Performed by: INTERNAL MEDICINE

## 2021-06-18 PROCEDURE — 1111F PR DISCHARGE MEDS RECONCILED W/ CURRENT OUTPATIENT MED LIST: ICD-10-PCS | Mod: CPTII,S$GLB,, | Performed by: INTERNAL MEDICINE

## 2021-06-18 PROCEDURE — 1111F DSCHRG MED/CURRENT MED MERGE: CPT | Mod: CPTII,S$GLB,, | Performed by: INTERNAL MEDICINE

## 2021-06-21 ENCOUNTER — TELEPHONE (OUTPATIENT)
Dept: UROLOGY | Facility: CLINIC | Age: 66
End: 2021-06-21

## 2021-06-22 PROCEDURE — G0179 PR HOME HEALTH MD RECERTIFICATION: ICD-10-PCS | Mod: ,,, | Performed by: UROLOGY

## 2021-06-22 PROCEDURE — G0179 MD RECERTIFICATION HHA PT: HCPCS | Mod: ,,, | Performed by: UROLOGY

## 2021-06-25 ENCOUNTER — TELEPHONE (OUTPATIENT)
Dept: INTERNAL MEDICINE | Facility: CLINIC | Age: 66
End: 2021-06-25

## 2021-06-25 DIAGNOSIS — R11.0 NAUSEA: Primary | ICD-10-CM

## 2021-06-25 RX ORDER — ONDANSETRON 4 MG/1
4 TABLET, ORALLY DISINTEGRATING ORAL 2 TIMES DAILY
Qty: 60 TABLET | Refills: 0 | Status: SHIPPED | OUTPATIENT
Start: 2021-06-25 | End: 2022-02-03

## 2021-06-28 ENCOUNTER — DOCUMENT SCAN (OUTPATIENT)
Dept: HOME HEALTH SERVICES | Facility: HOSPITAL | Age: 66
End: 2021-06-28
Payer: MEDICAID

## 2021-06-29 ENCOUNTER — EXTERNAL HOME HEALTH (OUTPATIENT)
Dept: HOME HEALTH SERVICES | Facility: HOSPITAL | Age: 66
End: 2021-06-29
Payer: MEDICARE

## 2021-07-01 ENCOUNTER — TELEPHONE (OUTPATIENT)
Dept: INTERNAL MEDICINE | Facility: CLINIC | Age: 66
End: 2021-07-01

## 2021-07-05 ENCOUNTER — NURSE TRIAGE (OUTPATIENT)
Dept: ADMINISTRATIVE | Facility: CLINIC | Age: 66
End: 2021-07-05

## 2021-07-06 ENCOUNTER — HOSPITAL ENCOUNTER (OUTPATIENT)
Facility: HOSPITAL | Age: 66
LOS: 1 days | Discharge: HOME HEALTH | End: 2021-07-08
Attending: EMERGENCY MEDICINE | Admitting: HOSPITALIST
Payer: MEDICARE

## 2021-07-06 ENCOUNTER — TELEPHONE (OUTPATIENT)
Dept: INTERNAL MEDICINE | Facility: CLINIC | Age: 66
End: 2021-07-06

## 2021-07-06 ENCOUNTER — OFFICE VISIT (OUTPATIENT)
Dept: INTERNAL MEDICINE | Facility: CLINIC | Age: 66
End: 2021-07-06
Payer: MEDICARE

## 2021-07-06 VITALS — HEART RATE: 80 BPM | SYSTOLIC BLOOD PRESSURE: 94 MMHG | DIASTOLIC BLOOD PRESSURE: 62 MMHG | OXYGEN SATURATION: 96 %

## 2021-07-06 DIAGNOSIS — L03.119 CELLULITIS OF LOWER EXTREMITY, UNSPECIFIED LATERALITY: ICD-10-CM

## 2021-07-06 DIAGNOSIS — R07.9 CHEST PAIN: ICD-10-CM

## 2021-07-06 DIAGNOSIS — R31.9 URINARY TRACT INFECTION WITH HEMATURIA, SITE UNSPECIFIED: ICD-10-CM

## 2021-07-06 DIAGNOSIS — I50.33 ACUTE ON CHRONIC DIASTOLIC HEART FAILURE: ICD-10-CM

## 2021-07-06 DIAGNOSIS — I50.9 CHF (CONGESTIVE HEART FAILURE): ICD-10-CM

## 2021-07-06 DIAGNOSIS — I89.0 LYMPHEDEMA OF BOTH LOWER EXTREMITIES: Chronic | ICD-10-CM

## 2021-07-06 DIAGNOSIS — M79.89 LEG SWELLING: ICD-10-CM

## 2021-07-06 DIAGNOSIS — R60.0 BILATERAL LOWER EXTREMITY EDEMA: Primary | ICD-10-CM

## 2021-07-06 DIAGNOSIS — I89.0 LYMPHEDEMA OF BOTH LOWER EXTREMITIES: Primary | ICD-10-CM

## 2021-07-06 DIAGNOSIS — N39.0 URINARY TRACT INFECTION WITH HEMATURIA, SITE UNSPECIFIED: ICD-10-CM

## 2021-07-06 DIAGNOSIS — I50.9 CONGESTIVE HEART FAILURE, UNSPECIFIED HF CHRONICITY, UNSPECIFIED HEART FAILURE TYPE: ICD-10-CM

## 2021-07-06 LAB
ALBUMIN SERPL BCP-MCNC: 3.3 G/DL (ref 3.5–5.2)
ALP SERPL-CCNC: 113 U/L (ref 55–135)
ALT SERPL W/O P-5'-P-CCNC: 10 U/L (ref 10–44)
ANION GAP SERPL CALC-SCNC: 8 MMOL/L (ref 8–16)
AST SERPL-CCNC: 13 U/L (ref 10–40)
BACTERIA #/AREA URNS HPF: ABNORMAL /HPF
BASOPHILS # BLD AUTO: 0.02 K/UL (ref 0–0.2)
BASOPHILS NFR BLD: 0.4 % (ref 0–1.9)
BILIRUB SERPL-MCNC: 0.3 MG/DL (ref 0.1–1)
BILIRUB UR QL STRIP: NEGATIVE
BNP SERPL-MCNC: 29 PG/ML (ref 0–99)
BUN SERPL-MCNC: 7 MG/DL (ref 8–23)
CALCIUM SERPL-MCNC: 8.7 MG/DL (ref 8.7–10.5)
CHLORIDE SERPL-SCNC: 98 MMOL/L (ref 95–110)
CLARITY UR: ABNORMAL
CO2 SERPL-SCNC: 32 MMOL/L (ref 23–29)
COLOR UR: YELLOW
CREAT SERPL-MCNC: 1 MG/DL (ref 0.5–1.4)
CRP SERPL-MCNC: 10.4 MG/L (ref 0–8.2)
DIFFERENTIAL METHOD: ABNORMAL
EOSINOPHIL # BLD AUTO: 0.2 K/UL (ref 0–0.5)
EOSINOPHIL NFR BLD: 3.1 % (ref 0–8)
ERYTHROCYTE [DISTWIDTH] IN BLOOD BY AUTOMATED COUNT: 14.6 % (ref 11.5–14.5)
EST. GFR  (AFRICAN AMERICAN): >60 ML/MIN/1.73 M^2
EST. GFR  (NON AFRICAN AMERICAN): 60 ML/MIN/1.73 M^2
GLUCOSE SERPL-MCNC: 96 MG/DL (ref 70–110)
GLUCOSE UR QL STRIP: NEGATIVE
HCT VFR BLD AUTO: 32.4 % (ref 37–48.5)
HGB BLD-MCNC: 10.3 G/DL (ref 12–16)
HGB UR QL STRIP: ABNORMAL
HYALINE CASTS #/AREA URNS LPF: 5 /LPF
IMM GRANULOCYTES # BLD AUTO: 0.01 K/UL (ref 0–0.04)
IMM GRANULOCYTES NFR BLD AUTO: 0.2 % (ref 0–0.5)
KETONES UR QL STRIP: NEGATIVE
LEUKOCYTE ESTERASE UR QL STRIP: ABNORMAL
LYMPHOCYTES # BLD AUTO: 1.1 K/UL (ref 1–4.8)
LYMPHOCYTES NFR BLD: 23.9 % (ref 18–48)
MCH RBC QN AUTO: 28.2 PG (ref 27–31)
MCHC RBC AUTO-ENTMCNC: 31.8 G/DL (ref 32–36)
MCV RBC AUTO: 89 FL (ref 82–98)
MICROSCOPIC COMMENT: ABNORMAL
MONOCYTES # BLD AUTO: 0.5 K/UL (ref 0.3–1)
MONOCYTES NFR BLD: 9.9 % (ref 4–15)
NEUTROPHILS # BLD AUTO: 3 K/UL (ref 1.8–7.7)
NEUTROPHILS NFR BLD: 62.5 % (ref 38–73)
NITRITE UR QL STRIP: POSITIVE
NRBC BLD-RTO: 0 /100 WBC
PH UR STRIP: 6 [PH] (ref 5–8)
PLATELET # BLD AUTO: 261 K/UL (ref 150–450)
PMV BLD AUTO: 11.1 FL (ref 9.2–12.9)
POTASSIUM SERPL-SCNC: 3.2 MMOL/L (ref 3.5–5.1)
PROT SERPL-MCNC: 6.9 G/DL (ref 6–8.4)
PROT UR QL STRIP: ABNORMAL
RBC # BLD AUTO: 3.65 M/UL (ref 4–5.4)
RBC #/AREA URNS HPF: 8 /HPF (ref 0–4)
SODIUM SERPL-SCNC: 138 MMOL/L (ref 136–145)
SP GR UR STRIP: 1.01 (ref 1–1.03)
SQUAMOUS #/AREA URNS HPF: 0 /HPF
TROPONIN I SERPL DL<=0.01 NG/ML-MCNC: 0.01 NG/ML (ref 0–0.03)
URN SPEC COLLECT METH UR: ABNORMAL
UROBILINOGEN UR STRIP-ACNC: NEGATIVE EU/DL
WBC # BLD AUTO: 4.77 K/UL (ref 3.9–12.7)
WBC #/AREA URNS HPF: >100 /HPF (ref 0–5)
WBC CLUMPS URNS QL MICRO: ABNORMAL

## 2021-07-06 PROCEDURE — 87186 SC STD MICRODIL/AGAR DIL: CPT | Performed by: EMERGENCY MEDICINE

## 2021-07-06 PROCEDURE — 99999 PR PBB SHADOW E&M-EST. PATIENT-LVL IV: ICD-10-PCS | Mod: PBBFAC,,, | Performed by: INTERNAL MEDICINE

## 2021-07-06 PROCEDURE — 1111F PR DISCHARGE MEDS RECONCILED W/ CURRENT OUTPATIENT MED LIST: ICD-10-PCS | Mod: CPTII,S$GLB,, | Performed by: INTERNAL MEDICINE

## 2021-07-06 PROCEDURE — 99285 EMERGENCY DEPT VISIT HI MDM: CPT | Mod: 25

## 2021-07-06 PROCEDURE — 1125F PR PAIN SEVERITY QUANTIFIED, PAIN PRESENT: ICD-10-PCS | Mod: S$GLB,,, | Performed by: INTERNAL MEDICINE

## 2021-07-06 PROCEDURE — 83880 ASSAY OF NATRIURETIC PEPTIDE: CPT | Performed by: EMERGENCY MEDICINE

## 2021-07-06 PROCEDURE — 87077 CULTURE AEROBIC IDENTIFY: CPT | Performed by: EMERGENCY MEDICINE

## 2021-07-06 PROCEDURE — 1125F AMNT PAIN NOTED PAIN PRSNT: CPT | Mod: S$GLB,,, | Performed by: INTERNAL MEDICINE

## 2021-07-06 PROCEDURE — 80053 COMPREHEN METABOLIC PANEL: CPT | Performed by: EMERGENCY MEDICINE

## 2021-07-06 PROCEDURE — 1111F DSCHRG MED/CURRENT MED MERGE: CPT | Mod: CPTII,S$GLB,, | Performed by: INTERNAL MEDICINE

## 2021-07-06 PROCEDURE — 96375 TX/PRO/DX INJ NEW DRUG ADDON: CPT

## 2021-07-06 PROCEDURE — 84484 ASSAY OF TROPONIN QUANT: CPT | Performed by: EMERGENCY MEDICINE

## 2021-07-06 PROCEDURE — 81000 URINALYSIS NONAUTO W/SCOPE: CPT | Performed by: EMERGENCY MEDICINE

## 2021-07-06 PROCEDURE — 84145 PROCALCITONIN (PCT): CPT | Performed by: EMERGENCY MEDICINE

## 2021-07-06 PROCEDURE — 99213 OFFICE O/P EST LOW 20 MIN: CPT | Mod: S$GLB,,, | Performed by: INTERNAL MEDICINE

## 2021-07-06 PROCEDURE — 99213 PR OFFICE/OUTPT VISIT, EST, LEVL III, 20-29 MIN: ICD-10-PCS | Mod: S$GLB,,, | Performed by: INTERNAL MEDICINE

## 2021-07-06 PROCEDURE — 85025 COMPLETE CBC W/AUTO DIFF WBC: CPT | Performed by: EMERGENCY MEDICINE

## 2021-07-06 PROCEDURE — 87088 URINE BACTERIA CULTURE: CPT | Performed by: EMERGENCY MEDICINE

## 2021-07-06 PROCEDURE — 96374 THER/PROPH/DIAG INJ IV PUSH: CPT

## 2021-07-06 PROCEDURE — 87086 URINE CULTURE/COLONY COUNT: CPT | Performed by: EMERGENCY MEDICINE

## 2021-07-06 PROCEDURE — 86140 C-REACTIVE PROTEIN: CPT | Performed by: EMERGENCY MEDICINE

## 2021-07-06 PROCEDURE — 99999 PR PBB SHADOW E&M-EST. PATIENT-LVL IV: CPT | Mod: PBBFAC,,, | Performed by: INTERNAL MEDICINE

## 2021-07-06 PROCEDURE — 63600175 PHARM REV CODE 636 W HCPCS: Performed by: EMERGENCY MEDICINE

## 2021-07-06 RX ORDER — FUROSEMIDE 10 MG/ML
120 INJECTION INTRAMUSCULAR; INTRAVENOUS
Status: COMPLETED | OUTPATIENT
Start: 2021-07-06 | End: 2021-07-06

## 2021-07-06 RX ORDER — HYDROMORPHONE HYDROCHLORIDE 1 MG/ML
0.5 INJECTION, SOLUTION INTRAMUSCULAR; INTRAVENOUS; SUBCUTANEOUS
Status: COMPLETED | OUTPATIENT
Start: 2021-07-06 | End: 2021-07-06

## 2021-07-06 RX ADMIN — FUROSEMIDE 120 MG: 10 INJECTION, SOLUTION INTRAVENOUS at 10:07

## 2021-07-06 RX ADMIN — HYDROMORPHONE HYDROCHLORIDE 0.5 MG: 1 INJECTION, SOLUTION INTRAMUSCULAR; INTRAVENOUS; SUBCUTANEOUS at 11:07

## 2021-07-07 PROBLEM — I50.32 CHRONIC DIASTOLIC HEART FAILURE: Chronic | Status: ACTIVE | Noted: 2020-01-13

## 2021-07-07 PROBLEM — L03.90 CELLULITIS: Status: ACTIVE | Noted: 2021-07-07

## 2021-07-07 PROBLEM — R60.0 BILATERAL LOWER EXTREMITY EDEMA: Status: ACTIVE | Noted: 2021-07-07

## 2021-07-07 PROBLEM — I50.33 ACUTE ON CHRONIC DIASTOLIC HEART FAILURE: Status: ACTIVE | Noted: 2020-01-13

## 2021-07-07 LAB
ALBUMIN SERPL BCP-MCNC: 2.9 G/DL (ref 3.5–5.2)
ALP SERPL-CCNC: 102 U/L (ref 55–135)
ALT SERPL W/O P-5'-P-CCNC: 9 U/L (ref 10–44)
ANION GAP SERPL CALC-SCNC: 10 MMOL/L (ref 8–16)
AORTIC ROOT ANNULUS: 3.37 CM
ASCENDING AORTA: 2.67 CM
AST SERPL-CCNC: 13 U/L (ref 10–40)
AV INDEX (PROSTH): 0.61
AV MEAN GRADIENT: 8 MMHG
AV PEAK GRADIENT: 15 MMHG
AV VALVE AREA: 1.92 CM2
AV VELOCITY RATIO: 0.72
BILIRUB SERPL-MCNC: 0.1 MG/DL (ref 0.1–1)
BSA FOR ECHO PROCEDURE: 2.03 M2
BUN SERPL-MCNC: 8 MG/DL (ref 8–23)
CALCIUM SERPL-MCNC: 8.1 MG/DL (ref 8.7–10.5)
CHLORIDE SERPL-SCNC: 100 MMOL/L (ref 95–110)
CO2 SERPL-SCNC: 29 MMOL/L (ref 23–29)
CREAT SERPL-MCNC: 0.9 MG/DL (ref 0.5–1.4)
CV ECHO LV RWT: 0.3 CM
DOP CALC AO PEAK VEL: 1.96 M/S
DOP CALC AO VTI: 43.61 CM
DOP CALC LVOT AREA: 3.1 CM2
DOP CALC LVOT DIAMETER: 2 CM
DOP CALC LVOT PEAK VEL: 1.41 M/S
DOP CALC LVOT STROKE VOLUME: 83.93 CM3
DOP CALCLVOT PEAK VEL VTI: 26.73 CM
E WAVE DECELERATION TIME: 151.89 MSEC
E/A RATIO: 1.17
E/E' RATIO: 10.09 M/S
ECHO LV POSTERIOR WALL: 0.77 CM (ref 0.6–1.1)
EJECTION FRACTION: 55 %
EST. GFR  (AFRICAN AMERICAN): >60 ML/MIN/1.73 M^2
EST. GFR  (NON AFRICAN AMERICAN): >60 ML/MIN/1.73 M^2
ESTIMATED AVG GLUCOSE: 97 MG/DL (ref 68–131)
FRACTIONAL SHORTENING: 40 % (ref 28–44)
GLUCOSE SERPL-MCNC: 79 MG/DL (ref 70–110)
HBA1C MFR BLD: 5 % (ref 4–5.6)
INTERVENTRICULAR SEPTUM: 0.64 CM (ref 0.6–1.1)
IVRT: 77.07 MSEC
LA MAJOR: 6.83 CM
LA MINOR: 6.06 CM
LA WIDTH: 4.23 CM
LEFT ATRIUM SIZE: 2.59 CM
LEFT ATRIUM VOLUME INDEX MOD: 42.9 ML/M2
LEFT ATRIUM VOLUME INDEX: 30.5 ML/M2
LEFT ATRIUM VOLUME MOD: 84.11 CM3
LEFT ATRIUM VOLUME: 59.8 CM3
LEFT INTERNAL DIMENSION IN SYSTOLE: 3.11 CM (ref 2.1–4)
LEFT VENTRICLE DIASTOLIC VOLUME INDEX: 64.54 ML/M2
LEFT VENTRICLE DIASTOLIC VOLUME: 126.5 ML
LEFT VENTRICLE MASS INDEX: 62 G/M2
LEFT VENTRICLE SYSTOLIC VOLUME INDEX: 19.5 ML/M2
LEFT VENTRICLE SYSTOLIC VOLUME: 38.22 ML
LEFT VENTRICULAR INTERNAL DIMENSION IN DIASTOLE: 5.15 CM (ref 3.5–6)
LEFT VENTRICULAR MASS: 121.83 G
LV LATERAL E/E' RATIO: 8.92 M/S
LV SEPTAL E/E' RATIO: 11.6 M/S
MAGNESIUM SERPL-MCNC: 2.1 MG/DL (ref 1.6–2.6)
MV PEAK A VEL: 0.99 M/S
MV PEAK E VEL: 1.16 M/S
MV PEAK GRADIENT: 7 MMHG
MV STENOSIS PRESSURE HALF TIME: 44.05 MS
MV VALVE AREA P 1/2 METHOD: 4.99 CM2
PHOSPHATE SERPL-MCNC: 2.9 MG/DL (ref 2.7–4.5)
PISA MRMAX VEL: 0.03 M/S
PISA TR MAX VEL: 3.13 M/S
POTASSIUM SERPL-SCNC: 3.4 MMOL/L (ref 3.5–5.1)
PROCALCITONIN SERPL IA-MCNC: 0.02 NG/ML
PROT SERPL-MCNC: 5.7 G/DL (ref 6–8.4)
PULM VEIN S/D RATIO: 1.2
PV PEAK D VEL: 0.4 M/S
PV PEAK S VEL: 0.48 M/S
PV PEAK VELOCITY: 1.1 CM/S
RA MAJOR: 5.71 CM
RA PRESSURE: 8 MMHG
RA WIDTH: 3.72 CM
RIGHT VENTRICULAR END-DIASTOLIC DIMENSION: 2.2 CM
RV TISSUE DOPPLER FREE WALL SYSTOLIC VELOCITY 1 (APICAL 4 CHAMBER VIEW): 13.16 CM/S
SODIUM SERPL-SCNC: 139 MMOL/L (ref 136–145)
STJ: 2.33 CM
TDI LATERAL: 0.13 M/S
TDI SEPTAL: 0.1 M/S
TDI: 0.12 M/S
TR MAX PG: 39 MMHG
TV REST PULMONARY ARTERY PRESSURE: 47 MMHG

## 2021-07-07 PROCEDURE — 97530 THERAPEUTIC ACTIVITIES: CPT | Performed by: PHYSICAL THERAPIST

## 2021-07-07 PROCEDURE — 99220 PR INITIAL OBSERVATION CARE,LEVL III: ICD-10-PCS | Mod: 25,,, | Performed by: INTERNAL MEDICINE

## 2021-07-07 PROCEDURE — 84100 ASSAY OF PHOSPHORUS: CPT | Performed by: NURSE PRACTITIONER

## 2021-07-07 PROCEDURE — 97162 PT EVAL MOD COMPLEX 30 MIN: CPT | Performed by: PHYSICAL THERAPIST

## 2021-07-07 PROCEDURE — 25000003 PHARM REV CODE 250: Performed by: HOSPITALIST

## 2021-07-07 PROCEDURE — 96365 THER/PROPH/DIAG IV INF INIT: CPT

## 2021-07-07 PROCEDURE — 94761 N-INVAS EAR/PLS OXIMETRY MLT: CPT

## 2021-07-07 PROCEDURE — G0378 HOSPITAL OBSERVATION PER HR: HCPCS

## 2021-07-07 PROCEDURE — 94760 N-INVAS EAR/PLS OXIMETRY 1: CPT

## 2021-07-07 PROCEDURE — 99900035 HC TECH TIME PER 15 MIN (STAT)

## 2021-07-07 PROCEDURE — 83036 HEMOGLOBIN GLYCOSYLATED A1C: CPT | Performed by: NURSE PRACTITIONER

## 2021-07-07 PROCEDURE — 96366 THER/PROPH/DIAG IV INF ADDON: CPT

## 2021-07-07 PROCEDURE — 99220 PR INITIAL OBSERVATION CARE,LEVL III: CPT | Mod: 25,,, | Performed by: INTERNAL MEDICINE

## 2021-07-07 PROCEDURE — 25000003 PHARM REV CODE 250: Performed by: NURSE PRACTITIONER

## 2021-07-07 PROCEDURE — 83735 ASSAY OF MAGNESIUM: CPT | Performed by: NURSE PRACTITIONER

## 2021-07-07 PROCEDURE — 63600175 PHARM REV CODE 636 W HCPCS: Performed by: EMERGENCY MEDICINE

## 2021-07-07 PROCEDURE — 80053 COMPREHEN METABOLIC PANEL: CPT | Performed by: NURSE PRACTITIONER

## 2021-07-07 PROCEDURE — 36415 COLL VENOUS BLD VENIPUNCTURE: CPT | Performed by: NURSE PRACTITIONER

## 2021-07-07 RX ORDER — SODIUM CHLORIDE 0.9 % (FLUSH) 0.9 %
10 SYRINGE (ML) INJECTION
Status: DISCONTINUED | OUTPATIENT
Start: 2021-07-07 | End: 2021-07-08 | Stop reason: HOSPADM

## 2021-07-07 RX ORDER — ONDANSETRON 2 MG/ML
4 INJECTION INTRAMUSCULAR; INTRAVENOUS EVERY 8 HOURS PRN
Status: DISCONTINUED | OUTPATIENT
Start: 2021-07-07 | End: 2021-07-08 | Stop reason: HOSPADM

## 2021-07-07 RX ORDER — BACLOFEN 5 MG/1
5 TABLET ORAL 3 TIMES DAILY
Status: DISCONTINUED | OUTPATIENT
Start: 2021-07-07 | End: 2021-07-08 | Stop reason: HOSPADM

## 2021-07-07 RX ORDER — TORSEMIDE 20 MG/1
100 TABLET ORAL DAILY
Status: DISCONTINUED | OUTPATIENT
Start: 2021-07-08 | End: 2021-07-08 | Stop reason: HOSPADM

## 2021-07-07 RX ORDER — LEVOTHYROXINE SODIUM 125 UG/1
125 TABLET ORAL
Status: DISCONTINUED | OUTPATIENT
Start: 2021-07-07 | End: 2021-07-08 | Stop reason: HOSPADM

## 2021-07-07 RX ORDER — PANTOPRAZOLE SODIUM 40 MG/1
40 TABLET, DELAYED RELEASE ORAL DAILY
Status: DISCONTINUED | OUTPATIENT
Start: 2021-07-07 | End: 2021-07-08 | Stop reason: HOSPADM

## 2021-07-07 RX ORDER — ASPIRIN 81 MG/1
81 TABLET ORAL DAILY
Status: DISCONTINUED | OUTPATIENT
Start: 2021-07-07 | End: 2021-07-08 | Stop reason: HOSPADM

## 2021-07-07 RX ORDER — FUROSEMIDE 10 MG/ML
60 INJECTION INTRAMUSCULAR; INTRAVENOUS
Status: DISCONTINUED | OUTPATIENT
Start: 2021-07-08 | End: 2021-07-07

## 2021-07-07 RX ORDER — ACETAMINOPHEN 325 MG/1
650 TABLET ORAL EVERY 8 HOURS PRN
Status: DISCONTINUED | OUTPATIENT
Start: 2021-07-07 | End: 2021-07-08 | Stop reason: HOSPADM

## 2021-07-07 RX ORDER — POTASSIUM CHLORIDE 750 MG/1
10 TABLET, EXTENDED RELEASE ORAL DAILY
Status: DISCONTINUED | OUTPATIENT
Start: 2021-07-08 | End: 2021-07-08 | Stop reason: HOSPADM

## 2021-07-07 RX ORDER — DOXYCYCLINE HYCLATE 100 MG
100 TABLET ORAL EVERY 12 HOURS
Status: DISCONTINUED | OUTPATIENT
Start: 2021-07-07 | End: 2021-07-08 | Stop reason: HOSPADM

## 2021-07-07 RX ORDER — LANOLIN ALCOHOL/MO/W.PET/CERES
1000 CREAM (GRAM) TOPICAL DAILY
Status: DISCONTINUED | OUTPATIENT
Start: 2021-07-07 | End: 2021-07-08 | Stop reason: HOSPADM

## 2021-07-07 RX ORDER — FLUOXETINE HYDROCHLORIDE 20 MG/1
60 CAPSULE ORAL DAILY
Status: DISCONTINUED | OUTPATIENT
Start: 2021-07-07 | End: 2021-07-08 | Stop reason: HOSPADM

## 2021-07-07 RX ORDER — POTASSIUM CHLORIDE 20 MEQ/1
40 TABLET, EXTENDED RELEASE ORAL ONCE
Status: COMPLETED | OUTPATIENT
Start: 2021-07-07 | End: 2021-07-07

## 2021-07-07 RX ORDER — ATORVASTATIN CALCIUM 40 MG/1
80 TABLET, FILM COATED ORAL DAILY
Status: DISCONTINUED | OUTPATIENT
Start: 2021-07-07 | End: 2021-07-08 | Stop reason: HOSPADM

## 2021-07-07 RX ORDER — TALC
6 POWDER (GRAM) TOPICAL NIGHTLY PRN
Status: DISCONTINUED | OUTPATIENT
Start: 2021-07-07 | End: 2021-07-08 | Stop reason: HOSPADM

## 2021-07-07 RX ORDER — AMOXICILLIN 250 MG
1 CAPSULE ORAL DAILY PRN
Status: DISCONTINUED | OUTPATIENT
Start: 2021-07-07 | End: 2021-07-08 | Stop reason: HOSPADM

## 2021-07-07 RX ORDER — OLANZAPINE 2.5 MG/1
5 TABLET ORAL NIGHTLY
Status: DISCONTINUED | OUTPATIENT
Start: 2021-07-07 | End: 2021-07-08 | Stop reason: HOSPADM

## 2021-07-07 RX ORDER — HYDROCODONE BITARTRATE AND ACETAMINOPHEN 5; 325 MG/1; MG/1
1 TABLET ORAL EVERY 6 HOURS PRN
Status: DISCONTINUED | OUTPATIENT
Start: 2021-07-07 | End: 2021-07-08 | Stop reason: HOSPADM

## 2021-07-07 RX ORDER — TRAZODONE HYDROCHLORIDE 100 MG/1
300 TABLET ORAL NIGHTLY
Status: DISCONTINUED | OUTPATIENT
Start: 2021-07-07 | End: 2021-07-08 | Stop reason: HOSPADM

## 2021-07-07 RX ORDER — TORSEMIDE 20 MG/1
100 TABLET ORAL DAILY
Status: DISCONTINUED | OUTPATIENT
Start: 2021-07-07 | End: 2021-07-07

## 2021-07-07 RX ORDER — QUETIAPINE FUMARATE 100 MG/1
200 TABLET, FILM COATED ORAL NIGHTLY
Status: DISCONTINUED | OUTPATIENT
Start: 2021-07-07 | End: 2021-07-08 | Stop reason: HOSPADM

## 2021-07-07 RX ADMIN — FLUOXETINE 60 MG: 20 CAPSULE ORAL at 09:07

## 2021-07-07 RX ADMIN — BACLOFEN 5 MG: 5 TABLET ORAL at 03:07

## 2021-07-07 RX ADMIN — CYANOCOBALAMIN TAB 1000 MCG 1000 MCG: 1000 TAB at 09:07

## 2021-07-07 RX ADMIN — QUETIAPINE FUMARATE 200 MG: 100 TABLET ORAL at 02:07

## 2021-07-07 RX ADMIN — PANTOPRAZOLE SODIUM 40 MG: 40 TABLET, DELAYED RELEASE ORAL at 09:07

## 2021-07-07 RX ADMIN — DOXYCYCLINE HYCLATE 100 MG: 100 TABLET, COATED ORAL at 10:07

## 2021-07-07 RX ADMIN — ATORVASTATIN CALCIUM 80 MG: 40 TABLET, FILM COATED ORAL at 09:07

## 2021-07-07 RX ADMIN — ASPIRIN 81 MG: 81 TABLET, COATED ORAL at 09:07

## 2021-07-07 RX ADMIN — OLANZAPINE 5 MG: 2.5 TABLET, FILM COATED ORAL at 02:07

## 2021-07-07 RX ADMIN — POTASSIUM CHLORIDE 40 MEQ: 1500 TABLET, EXTENDED RELEASE ORAL at 01:07

## 2021-07-07 RX ADMIN — BACLOFEN 5 MG: 5 TABLET ORAL at 09:07

## 2021-07-07 RX ADMIN — TRAZODONE HYDROCHLORIDE 300 MG: 100 TABLET ORAL at 02:07

## 2021-07-07 RX ADMIN — CEFTRIAXONE 1 G: 1 INJECTION, SOLUTION INTRAVENOUS at 01:07

## 2021-07-07 RX ADMIN — LEVOTHYROXINE SODIUM 125 MCG: 125 TABLET ORAL at 06:07

## 2021-07-07 RX ADMIN — CEFTRIAXONE 1 G: 1 INJECTION, SOLUTION INTRAVENOUS at 11:07

## 2021-07-08 ENCOUNTER — DOCUMENT SCAN (OUTPATIENT)
Dept: HOME HEALTH SERVICES | Facility: HOSPITAL | Age: 66
End: 2021-07-08
Payer: MEDICAID

## 2021-07-08 VITALS
BODY MASS INDEX: 34.49 KG/M2 | TEMPERATURE: 98 F | SYSTOLIC BLOOD PRESSURE: 97 MMHG | HEART RATE: 61 BPM | DIASTOLIC BLOOD PRESSURE: 52 MMHG | RESPIRATION RATE: 18 BRPM | HEIGHT: 64 IN | WEIGHT: 202 LBS | OXYGEN SATURATION: 95 %

## 2021-07-08 LAB
ANION GAP SERPL CALC-SCNC: 12 MMOL/L (ref 8–16)
BASOPHILS # BLD AUTO: 0.02 K/UL (ref 0–0.2)
BASOPHILS NFR BLD: 0.4 % (ref 0–1.9)
BUN SERPL-MCNC: 6 MG/DL (ref 8–23)
CALCIUM SERPL-MCNC: 8.8 MG/DL (ref 8.7–10.5)
CHLORIDE SERPL-SCNC: 103 MMOL/L (ref 95–110)
CO2 SERPL-SCNC: 24 MMOL/L (ref 23–29)
CREAT SERPL-MCNC: 0.8 MG/DL (ref 0.5–1.4)
DIFFERENTIAL METHOD: ABNORMAL
EOSINOPHIL # BLD AUTO: 0.2 K/UL (ref 0–0.5)
EOSINOPHIL NFR BLD: 4.5 % (ref 0–8)
ERYTHROCYTE [DISTWIDTH] IN BLOOD BY AUTOMATED COUNT: 14.5 % (ref 11.5–14.5)
EST. GFR  (AFRICAN AMERICAN): >60 ML/MIN/1.73 M^2
EST. GFR  (NON AFRICAN AMERICAN): >60 ML/MIN/1.73 M^2
GLUCOSE SERPL-MCNC: 96 MG/DL (ref 70–110)
HCT VFR BLD AUTO: 34.2 % (ref 37–48.5)
HGB BLD-MCNC: 10.5 G/DL (ref 12–16)
IMM GRANULOCYTES # BLD AUTO: 0.01 K/UL (ref 0–0.04)
IMM GRANULOCYTES NFR BLD AUTO: 0.2 % (ref 0–0.5)
LYMPHOCYTES # BLD AUTO: 1.5 K/UL (ref 1–4.8)
LYMPHOCYTES NFR BLD: 33.7 % (ref 18–48)
MCH RBC QN AUTO: 27.8 PG (ref 27–31)
MCHC RBC AUTO-ENTMCNC: 30.7 G/DL (ref 32–36)
MCV RBC AUTO: 91 FL (ref 82–98)
MONOCYTES # BLD AUTO: 0.4 K/UL (ref 0.3–1)
MONOCYTES NFR BLD: 8.7 % (ref 4–15)
NEUTROPHILS # BLD AUTO: 2.4 K/UL (ref 1.8–7.7)
NEUTROPHILS NFR BLD: 52.5 % (ref 38–73)
NRBC BLD-RTO: 0 /100 WBC
PLATELET # BLD AUTO: 172 K/UL (ref 150–450)
PLATELET BLD QL SMEAR: ABNORMAL
PMV BLD AUTO: 10.6 FL (ref 9.2–12.9)
POTASSIUM SERPL-SCNC: 4 MMOL/L (ref 3.5–5.1)
RBC # BLD AUTO: 3.78 M/UL (ref 4–5.4)
SODIUM SERPL-SCNC: 139 MMOL/L (ref 136–145)
TSH SERPL DL<=0.005 MIU/L-ACNC: 0.96 UIU/ML (ref 0.4–4)
WBC # BLD AUTO: 4.48 K/UL (ref 3.9–12.7)

## 2021-07-08 PROCEDURE — 36415 COLL VENOUS BLD VENIPUNCTURE: CPT | Performed by: NURSE PRACTITIONER

## 2021-07-08 PROCEDURE — 97535 SELF CARE MNGMENT TRAINING: CPT

## 2021-07-08 PROCEDURE — 94660 CPAP INITIATION&MGMT: CPT

## 2021-07-08 PROCEDURE — 94761 N-INVAS EAR/PLS OXIMETRY MLT: CPT

## 2021-07-08 PROCEDURE — 97165 OT EVAL LOW COMPLEX 30 MIN: CPT

## 2021-07-08 PROCEDURE — 85025 COMPLETE CBC W/AUTO DIFF WBC: CPT | Performed by: NURSE PRACTITIONER

## 2021-07-08 PROCEDURE — 80048 BASIC METABOLIC PNL TOTAL CA: CPT | Performed by: NURSE PRACTITIONER

## 2021-07-08 PROCEDURE — 25000003 PHARM REV CODE 250: Performed by: HOSPITALIST

## 2021-07-08 PROCEDURE — 97530 THERAPEUTIC ACTIVITIES: CPT

## 2021-07-08 PROCEDURE — 63600175 PHARM REV CODE 636 W HCPCS: Performed by: HOSPITALIST

## 2021-07-08 PROCEDURE — G0378 HOSPITAL OBSERVATION PER HR: HCPCS

## 2021-07-08 PROCEDURE — 27000190 HC CPAP FULL FACE MASK W/VALVE

## 2021-07-08 PROCEDURE — 97110 THERAPEUTIC EXERCISES: CPT | Mod: CQ

## 2021-07-08 PROCEDURE — 99900035 HC TECH TIME PER 15 MIN (STAT)

## 2021-07-08 PROCEDURE — 25000003 PHARM REV CODE 250: Performed by: NURSE PRACTITIONER

## 2021-07-08 PROCEDURE — 97110 THERAPEUTIC EXERCISES: CPT

## 2021-07-08 PROCEDURE — 96376 TX/PRO/DX INJ SAME DRUG ADON: CPT

## 2021-07-08 PROCEDURE — 96366 THER/PROPH/DIAG IV INF ADDON: CPT

## 2021-07-08 PROCEDURE — 63600175 PHARM REV CODE 636 W HCPCS: Performed by: NURSE PRACTITIONER

## 2021-07-08 PROCEDURE — 27000221 HC OXYGEN, UP TO 24 HOURS

## 2021-07-08 PROCEDURE — 97116 GAIT TRAINING THERAPY: CPT | Mod: CQ

## 2021-07-08 PROCEDURE — 84443 ASSAY THYROID STIM HORMONE: CPT | Performed by: NURSE PRACTITIONER

## 2021-07-08 RX ORDER — HYDROMORPHONE HYDROCHLORIDE 1 MG/ML
0.5 INJECTION, SOLUTION INTRAMUSCULAR; INTRAVENOUS; SUBCUTANEOUS ONCE
Status: COMPLETED | OUTPATIENT
Start: 2021-07-08 | End: 2021-07-08

## 2021-07-08 RX ORDER — DOXYCYCLINE HYCLATE 100 MG
100 TABLET ORAL EVERY 12 HOURS
Qty: 14 TABLET | Refills: 0 | Status: SHIPPED | OUTPATIENT
Start: 2021-07-08 | End: 2021-07-15

## 2021-07-08 RX ORDER — CEPHALEXIN 500 MG/1
500 CAPSULE ORAL EVERY 12 HOURS
Qty: 14 CAPSULE | Refills: 0 | Status: SHIPPED | OUTPATIENT
Start: 2021-07-08 | End: 2021-07-15

## 2021-07-08 RX ADMIN — ATORVASTATIN CALCIUM 80 MG: 40 TABLET, FILM COATED ORAL at 10:07

## 2021-07-08 RX ADMIN — PANTOPRAZOLE SODIUM 40 MG: 40 TABLET, DELAYED RELEASE ORAL at 10:07

## 2021-07-08 RX ADMIN — ASPIRIN 81 MG: 81 TABLET, COATED ORAL at 10:07

## 2021-07-08 RX ADMIN — POTASSIUM CHLORIDE 10 MEQ: 750 TABLET, EXTENDED RELEASE ORAL at 10:07

## 2021-07-08 RX ADMIN — DOXYCYCLINE HYCLATE 100 MG: 100 TABLET, COATED ORAL at 10:07

## 2021-07-08 RX ADMIN — CEFTRIAXONE 1 G: 1 INJECTION, SOLUTION INTRAVENOUS at 10:07

## 2021-07-08 RX ADMIN — HYDROMORPHONE HYDROCHLORIDE 0.5 MG: 1 INJECTION, SOLUTION INTRAMUSCULAR; INTRAVENOUS; SUBCUTANEOUS at 09:07

## 2021-07-08 RX ADMIN — LEVOTHYROXINE SODIUM 125 MCG: 125 TABLET ORAL at 05:07

## 2021-07-08 RX ADMIN — FLUOXETINE 60 MG: 20 CAPSULE ORAL at 10:07

## 2021-07-08 RX ADMIN — CYANOCOBALAMIN TAB 1000 MCG 1000 MCG: 1000 TAB at 10:07

## 2021-07-08 RX ADMIN — TORSEMIDE 100 MG: 20 TABLET ORAL at 10:07

## 2021-07-08 RX ADMIN — BACLOFEN 5 MG: 5 TABLET ORAL at 10:07

## 2021-07-09 LAB — BACTERIA UR CULT: ABNORMAL

## 2021-07-12 ENCOUNTER — ANESTHESIA EVENT (OUTPATIENT)
Dept: SURGERY | Facility: HOSPITAL | Age: 66
End: 2021-07-12
Payer: MEDICARE

## 2021-07-12 ENCOUNTER — TELEPHONE (OUTPATIENT)
Dept: UROLOGY | Facility: CLINIC | Age: 66
End: 2021-07-12

## 2021-07-12 RX ORDER — HYDROCODONE BITARTRATE AND ACETAMINOPHEN 10; 325 MG/1; MG/1
1 TABLET ORAL EVERY 4 HOURS PRN
Status: ON HOLD | COMMUNITY
Start: 2021-06-14 | End: 2022-02-16 | Stop reason: HOSPADM

## 2021-07-13 ENCOUNTER — HOSPITAL ENCOUNTER (OUTPATIENT)
Facility: HOSPITAL | Age: 66
Discharge: HOME | End: 2021-07-13
Attending: UROLOGY | Admitting: UROLOGY
Payer: MEDICARE

## 2021-07-13 ENCOUNTER — ANESTHESIA (OUTPATIENT)
Dept: SURGERY | Facility: HOSPITAL | Age: 66
End: 2021-07-13
Payer: MEDICARE

## 2021-07-13 VITALS
TEMPERATURE: 98 F | OXYGEN SATURATION: 99 % | HEIGHT: 64 IN | SYSTOLIC BLOOD PRESSURE: 117 MMHG | WEIGHT: 186 LBS | DIASTOLIC BLOOD PRESSURE: 56 MMHG | BODY MASS INDEX: 31.76 KG/M2 | RESPIRATION RATE: 18 BRPM | HEART RATE: 57 BPM

## 2021-07-13 DIAGNOSIS — N31.9 NEUROGENIC BLADDER: ICD-10-CM

## 2021-07-13 PROCEDURE — D9220A PRA ANESTHESIA: Mod: ANES,,, | Performed by: ANESTHESIOLOGY

## 2021-07-13 PROCEDURE — D9220A PRA ANESTHESIA: ICD-10-PCS | Mod: ANES,,, | Performed by: ANESTHESIOLOGY

## 2021-07-13 PROCEDURE — 63600175 PHARM REV CODE 636 W HCPCS: Performed by: NURSE ANESTHETIST, CERTIFIED REGISTERED

## 2021-07-13 PROCEDURE — 36000706: Performed by: UROLOGY

## 2021-07-13 PROCEDURE — 37000009 HC ANESTHESIA EA ADD 15 MINS: Performed by: UROLOGY

## 2021-07-13 PROCEDURE — 63600175 PHARM REV CODE 636 W HCPCS: Mod: JG | Performed by: UROLOGY

## 2021-07-13 PROCEDURE — 37000008 HC ANESTHESIA 1ST 15 MINUTES: Performed by: UROLOGY

## 2021-07-13 PROCEDURE — 52287 CYSTOSCOPY CHEMODENERVATION: CPT | Mod: ,,, | Performed by: UROLOGY

## 2021-07-13 PROCEDURE — 51705 CHANGE OF BLADDER TUBE: CPT | Mod: 51,,, | Performed by: UROLOGY

## 2021-07-13 PROCEDURE — D9220A PRA ANESTHESIA: Mod: CRNA,,, | Performed by: NURSE ANESTHETIST, CERTIFIED REGISTERED

## 2021-07-13 PROCEDURE — D9220A PRA ANESTHESIA: ICD-10-PCS | Mod: CRNA,,, | Performed by: NURSE ANESTHETIST, CERTIFIED REGISTERED

## 2021-07-13 PROCEDURE — 71000015 HC POSTOP RECOV 1ST HR: Performed by: UROLOGY

## 2021-07-13 PROCEDURE — 63600175 PHARM REV CODE 636 W HCPCS: Performed by: STUDENT IN AN ORGANIZED HEALTH CARE EDUCATION/TRAINING PROGRAM

## 2021-07-13 PROCEDURE — 25000003 PHARM REV CODE 250: Performed by: NURSE ANESTHETIST, CERTIFIED REGISTERED

## 2021-07-13 PROCEDURE — 52287 PR CYSTOURETHROSCOPY WITH INJ FOR CHEMODENERVATION: ICD-10-PCS | Mod: ,,, | Performed by: UROLOGY

## 2021-07-13 PROCEDURE — 71000044 HC DOSC ROUTINE RECOVERY FIRST HOUR: Performed by: UROLOGY

## 2021-07-13 PROCEDURE — 51705 PR CHANGE OF BLADDER TUBE,SIMPLE: ICD-10-PCS | Mod: 51,,, | Performed by: UROLOGY

## 2021-07-13 PROCEDURE — 36000707: Performed by: UROLOGY

## 2021-07-13 RX ORDER — HYDROMORPHONE HYDROCHLORIDE 1 MG/ML
0.2 INJECTION, SOLUTION INTRAMUSCULAR; INTRAVENOUS; SUBCUTANEOUS EVERY 5 MIN PRN
Status: DISCONTINUED | OUTPATIENT
Start: 2021-07-13 | End: 2021-07-13 | Stop reason: HOSPADM

## 2021-07-13 RX ORDER — LIDOCAINE HYDROCHLORIDE 20 MG/ML
INJECTION INTRAVENOUS
Status: DISCONTINUED | OUTPATIENT
Start: 2021-07-13 | End: 2021-07-13

## 2021-07-13 RX ORDER — HYDROMORPHONE HYDROCHLORIDE 1 MG/ML
INJECTION, SOLUTION INTRAMUSCULAR; INTRAVENOUS; SUBCUTANEOUS
Status: DISCONTINUED
Start: 2021-07-13 | End: 2021-07-13 | Stop reason: HOSPADM

## 2021-07-13 RX ORDER — AMPICILLIN 1 G/1
INJECTION, POWDER, FOR SOLUTION INTRAMUSCULAR; INTRAVENOUS
Status: COMPLETED
Start: 2021-07-13 | End: 2021-07-13

## 2021-07-13 RX ORDER — HYDROMORPHONE HYDROCHLORIDE 2 MG/ML
INJECTION, SOLUTION INTRAMUSCULAR; INTRAVENOUS; SUBCUTANEOUS
Status: DISCONTINUED | OUTPATIENT
Start: 2021-07-13 | End: 2021-07-13

## 2021-07-13 RX ORDER — ONDANSETRON 2 MG/ML
4 INJECTION INTRAMUSCULAR; INTRAVENOUS DAILY PRN
Status: DISCONTINUED | OUTPATIENT
Start: 2021-07-13 | End: 2021-07-13 | Stop reason: HOSPADM

## 2021-07-13 RX ORDER — PROPOFOL 10 MG/ML
VIAL (ML) INTRAVENOUS CONTINUOUS PRN
Status: DISCONTINUED | OUTPATIENT
Start: 2021-07-13 | End: 2021-07-13

## 2021-07-13 RX ORDER — PROPOFOL 10 MG/ML
VIAL (ML) INTRAVENOUS
Status: DISCONTINUED | OUTPATIENT
Start: 2021-07-13 | End: 2021-07-13

## 2021-07-13 RX ORDER — ONDANSETRON 2 MG/ML
INJECTION INTRAMUSCULAR; INTRAVENOUS
Status: DISCONTINUED | OUTPATIENT
Start: 2021-07-13 | End: 2021-07-13

## 2021-07-13 RX ORDER — GENTAMICIN SULFATE 40 MG/ML
INJECTION, SOLUTION INTRAMUSCULAR; INTRAVENOUS
Status: DISCONTINUED
Start: 2021-07-13 | End: 2021-07-13 | Stop reason: HOSPADM

## 2021-07-13 RX ORDER — KETAMINE HYDROCHLORIDE 100 MG/ML
INJECTION, SOLUTION INTRAMUSCULAR; INTRAVENOUS
Status: DISCONTINUED | OUTPATIENT
Start: 2021-07-13 | End: 2021-07-13

## 2021-07-13 RX ORDER — MIDAZOLAM HYDROCHLORIDE 1 MG/ML
INJECTION, SOLUTION INTRAMUSCULAR; INTRAVENOUS
Status: DISCONTINUED | OUTPATIENT
Start: 2021-07-13 | End: 2021-07-13

## 2021-07-13 RX ORDER — GENTAMICIN SULFATE 100 MG/100ML
240 INJECTION, SOLUTION INTRAVENOUS
Status: COMPLETED | OUTPATIENT
Start: 2021-07-13 | End: 2021-07-13

## 2021-07-13 RX ORDER — LIDOCAINE HYDROCHLORIDE 10 MG/ML
1 INJECTION, SOLUTION EPIDURAL; INFILTRATION; INTRACAUDAL; PERINEURAL ONCE
Status: DISCONTINUED | OUTPATIENT
Start: 2021-07-13 | End: 2021-07-13 | Stop reason: HOSPADM

## 2021-07-13 RX ADMIN — SODIUM CHLORIDE: 0.9 INJECTION, SOLUTION INTRAVENOUS at 07:07

## 2021-07-13 RX ADMIN — KETAMINE HYDROCHLORIDE 25 MG: 100 INJECTION, SOLUTION, CONCENTRATE INTRAMUSCULAR; INTRAVENOUS at 07:07

## 2021-07-13 RX ADMIN — GENTAMICIN SULFATE 240 MG: 40 INJECTION, SOLUTION INTRAMUSCULAR; INTRAVENOUS at 08:07

## 2021-07-13 RX ADMIN — MIDAZOLAM HYDROCHLORIDE 2 MG: 1 INJECTION, SOLUTION INTRAMUSCULAR; INTRAVENOUS at 07:07

## 2021-07-13 RX ADMIN — ONDANSETRON 4 MG: 2 INJECTION INTRAMUSCULAR; INTRAVENOUS at 07:07

## 2021-07-13 RX ADMIN — AMPICILLIN 1 G: 1 INJECTION, POWDER, FOR SOLUTION INTRAMUSCULAR; INTRAVENOUS at 08:07

## 2021-07-13 RX ADMIN — PROPOFOL 50 MCG/KG/MIN: 10 INJECTION, EMULSION INTRAVENOUS at 07:07

## 2021-07-13 RX ADMIN — PROPOFOL 20 MG: 10 INJECTION, EMULSION INTRAVENOUS at 07:07

## 2021-07-13 RX ADMIN — GLYCOPYRROLATE 0.2 MG: 0.2 INJECTION, SOLUTION INTRAMUSCULAR; INTRAVITREAL at 07:07

## 2021-07-13 RX ADMIN — HYDROMORPHONE HYDROCHLORIDE 0.4 MG: 2 INJECTION, SOLUTION INTRAMUSCULAR; INTRAVENOUS; SUBCUTANEOUS at 07:07

## 2021-07-13 RX ADMIN — LIDOCAINE HYDROCHLORIDE 100 MG: 20 INJECTION, SOLUTION INTRAVENOUS at 07:07

## 2021-07-17 ENCOUNTER — DOCUMENT SCAN (OUTPATIENT)
Dept: HOME HEALTH SERVICES | Facility: HOSPITAL | Age: 66
End: 2021-07-17
Payer: MEDICAID

## 2021-07-20 ENCOUNTER — PATIENT OUTREACH (OUTPATIENT)
Dept: ADMINISTRATIVE | Facility: OTHER | Age: 66
End: 2021-07-20

## 2021-07-20 ENCOUNTER — TELEPHONE (OUTPATIENT)
Dept: INTERNAL MEDICINE | Facility: CLINIC | Age: 66
End: 2021-07-20

## 2021-07-20 ENCOUNTER — DOCUMENT SCAN (OUTPATIENT)
Dept: HOME HEALTH SERVICES | Facility: HOSPITAL | Age: 66
End: 2021-07-20
Payer: MEDICAID

## 2021-07-20 DIAGNOSIS — Z12.11 COLON CANCER SCREENING: Primary | ICD-10-CM

## 2021-07-22 ENCOUNTER — DOCUMENT SCAN (OUTPATIENT)
Dept: HOME HEALTH SERVICES | Facility: HOSPITAL | Age: 66
End: 2021-07-22
Payer: MEDICAID

## 2021-07-22 ENCOUNTER — TELEPHONE (OUTPATIENT)
Dept: UROLOGY | Facility: CLINIC | Age: 66
End: 2021-07-22

## 2021-07-22 ENCOUNTER — OFFICE VISIT (OUTPATIENT)
Dept: INTERNAL MEDICINE | Facility: CLINIC | Age: 66
End: 2021-07-22
Payer: MEDICARE

## 2021-07-22 VITALS
SYSTOLIC BLOOD PRESSURE: 100 MMHG | BODY MASS INDEX: 33.64 KG/M2 | DIASTOLIC BLOOD PRESSURE: 60 MMHG | WEIGHT: 197.06 LBS | HEIGHT: 64 IN | HEART RATE: 61 BPM | OXYGEN SATURATION: 94 %

## 2021-07-22 DIAGNOSIS — R06.00 DYSPNEA, UNSPECIFIED TYPE: Primary | ICD-10-CM

## 2021-07-22 DIAGNOSIS — G47.01 INSOMNIA DUE TO MEDICAL CONDITION: Chronic | ICD-10-CM

## 2021-07-22 DIAGNOSIS — G89.4 CHRONIC PAIN SYNDROME: Chronic | ICD-10-CM

## 2021-07-22 DIAGNOSIS — M47.26 OSTEOARTHRITIS OF SPINE WITH RADICULOPATHY, LUMBAR REGION: Chronic | ICD-10-CM

## 2021-07-22 DIAGNOSIS — I89.0 LYMPHEDEMA OF BOTH LOWER EXTREMITIES: Chronic | ICD-10-CM

## 2021-07-22 PROCEDURE — 1111F DSCHRG MED/CURRENT MED MERGE: CPT | Mod: CPTII,S$GLB,, | Performed by: INTERNAL MEDICINE

## 2021-07-22 PROCEDURE — 3008F PR BODY MASS INDEX (BMI) DOCUMENTED: ICD-10-PCS | Mod: CPTII,S$GLB,, | Performed by: INTERNAL MEDICINE

## 2021-07-22 PROCEDURE — 99214 OFFICE O/P EST MOD 30 MIN: CPT | Mod: S$GLB,,, | Performed by: INTERNAL MEDICINE

## 2021-07-22 PROCEDURE — 3008F BODY MASS INDEX DOCD: CPT | Mod: CPTII,S$GLB,, | Performed by: INTERNAL MEDICINE

## 2021-07-22 PROCEDURE — 1125F AMNT PAIN NOTED PAIN PRSNT: CPT | Mod: CPTII,S$GLB,, | Performed by: INTERNAL MEDICINE

## 2021-07-22 PROCEDURE — 99999 PR PBB SHADOW E&M-EST. PATIENT-LVL V: CPT | Mod: PBBFAC,,, | Performed by: INTERNAL MEDICINE

## 2021-07-22 PROCEDURE — 99214 PR OFFICE/OUTPT VISIT, EST, LEVL IV, 30-39 MIN: ICD-10-PCS | Mod: S$GLB,,, | Performed by: INTERNAL MEDICINE

## 2021-07-22 PROCEDURE — 99999 PR PBB SHADOW E&M-EST. PATIENT-LVL V: ICD-10-PCS | Mod: PBBFAC,,, | Performed by: INTERNAL MEDICINE

## 2021-07-22 PROCEDURE — 1111F PR DISCHARGE MEDS RECONCILED W/ CURRENT OUTPATIENT MED LIST: ICD-10-PCS | Mod: CPTII,S$GLB,, | Performed by: INTERNAL MEDICINE

## 2021-07-22 PROCEDURE — 1125F PR PAIN SEVERITY QUANTIFIED, PAIN PRESENT: ICD-10-PCS | Mod: CPTII,S$GLB,, | Performed by: INTERNAL MEDICINE

## 2021-07-23 ENCOUNTER — OFFICE VISIT (OUTPATIENT)
Dept: UROLOGY | Facility: CLINIC | Age: 66
End: 2021-07-23
Payer: MEDICARE

## 2021-07-23 DIAGNOSIS — T83.510D URINARY TRACT INFECTION ASSOCIATED WITH CYSTOSTOMY CATHETER, SUBSEQUENT ENCOUNTER: Primary | ICD-10-CM

## 2021-07-23 DIAGNOSIS — R33.9 URINARY RETENTION: Chronic | ICD-10-CM

## 2021-07-23 DIAGNOSIS — N39.0 URINARY TRACT INFECTION ASSOCIATED WITH CYSTOSTOMY CATHETER, SUBSEQUENT ENCOUNTER: Primary | ICD-10-CM

## 2021-07-23 DIAGNOSIS — N31.9 NEUROGENIC BLADDER: ICD-10-CM

## 2021-07-23 PROCEDURE — 1111F DSCHRG MED/CURRENT MED MERGE: CPT | Mod: CPTII,95,, | Performed by: UROLOGY

## 2021-07-23 PROCEDURE — 99441 PR PHYSICIAN TELEPHONE EVALUATION 5-10 MIN: CPT | Mod: 95,,, | Performed by: UROLOGY

## 2021-07-23 PROCEDURE — 99441 PR PHYSICIAN TELEPHONE EVALUATION 5-10 MIN: ICD-10-PCS | Mod: 95,,, | Performed by: UROLOGY

## 2021-07-23 PROCEDURE — 1111F PR DISCHARGE MEDS RECONCILED W/ CURRENT OUTPATIENT MED LIST: ICD-10-PCS | Mod: CPTII,95,, | Performed by: UROLOGY

## 2021-07-23 RX ORDER — CEFDINIR 300 MG/1
300 CAPSULE ORAL 2 TIMES DAILY
Qty: 14 CAPSULE | Refills: 0 | Status: SHIPPED | OUTPATIENT
Start: 2021-07-23 | End: 2021-07-30

## 2021-07-27 ENCOUNTER — TELEPHONE (OUTPATIENT)
Dept: INTERNAL MEDICINE | Facility: CLINIC | Age: 66
End: 2021-07-27

## 2021-07-27 DIAGNOSIS — I89.0 LYMPHEDEMA OF BOTH LOWER EXTREMITIES: Chronic | ICD-10-CM

## 2021-07-27 RX ORDER — TORSEMIDE 100 MG/1
100 TABLET ORAL 2 TIMES DAILY
Qty: 180 TABLET | Refills: 3 | Status: ON HOLD | OUTPATIENT
Start: 2021-07-27 | End: 2022-02-16 | Stop reason: HOSPADM

## 2021-07-29 ENCOUNTER — TELEPHONE (OUTPATIENT)
Dept: INTERNAL MEDICINE | Facility: CLINIC | Age: 66
End: 2021-07-29

## 2021-08-05 ENCOUNTER — DOCUMENT SCAN (OUTPATIENT)
Dept: HOME HEALTH SERVICES | Facility: HOSPITAL | Age: 66
End: 2021-08-05
Payer: MEDICAID

## 2021-08-10 ENCOUNTER — DOCUMENT SCAN (OUTPATIENT)
Dept: HOME HEALTH SERVICES | Facility: HOSPITAL | Age: 66
End: 2021-08-10
Payer: MEDICAID

## 2021-08-11 ENCOUNTER — PES CALL (OUTPATIENT)
Dept: ADMINISTRATIVE | Facility: CLINIC | Age: 66
End: 2021-08-11

## 2021-08-13 ENCOUNTER — DOCUMENT SCAN (OUTPATIENT)
Dept: HOME HEALTH SERVICES | Facility: HOSPITAL | Age: 66
End: 2021-08-13
Payer: MEDICAID

## 2021-08-16 ENCOUNTER — DOCUMENT SCAN (OUTPATIENT)
Dept: HOME HEALTH SERVICES | Facility: HOSPITAL | Age: 66
End: 2021-08-16
Payer: MEDICAID

## 2021-08-18 ENCOUNTER — TELEPHONE (OUTPATIENT)
Dept: UROLOGY | Facility: CLINIC | Age: 66
End: 2021-08-18

## 2021-08-18 ENCOUNTER — PATIENT MESSAGE (OUTPATIENT)
Dept: ADMINISTRATIVE | Facility: HOSPITAL | Age: 66
End: 2021-08-18

## 2021-08-18 ENCOUNTER — DOCUMENT SCAN (OUTPATIENT)
Dept: HOME HEALTH SERVICES | Facility: HOSPITAL | Age: 66
End: 2021-08-18
Payer: MEDICAID

## 2021-08-18 ENCOUNTER — PATIENT OUTREACH (OUTPATIENT)
Dept: ADMINISTRATIVE | Facility: HOSPITAL | Age: 66
End: 2021-08-18

## 2021-08-21 PROCEDURE — G0179 PR HOME HEALTH MD RECERTIFICATION: ICD-10-PCS | Mod: ,,, | Performed by: UROLOGY

## 2021-08-21 PROCEDURE — G0179 MD RECERTIFICATION HHA PT: HCPCS | Mod: ,,, | Performed by: UROLOGY

## 2021-08-25 ENCOUNTER — TELEPHONE (OUTPATIENT)
Dept: INTERNAL MEDICINE | Facility: CLINIC | Age: 66
End: 2021-08-25

## 2021-09-10 ENCOUNTER — TELEPHONE (OUTPATIENT)
Dept: UROLOGY | Facility: CLINIC | Age: 66
End: 2021-09-10

## 2021-09-13 ENCOUNTER — TELEPHONE (OUTPATIENT)
Dept: UROLOGY | Facility: CLINIC | Age: 66
End: 2021-09-13

## 2021-09-14 ENCOUNTER — EXTERNAL HOME HEALTH (OUTPATIENT)
Dept: HOME HEALTH SERVICES | Facility: HOSPITAL | Age: 66
End: 2021-09-14
Payer: MEDICARE

## 2021-09-15 ENCOUNTER — DOCUMENT SCAN (OUTPATIENT)
Dept: HOME HEALTH SERVICES | Facility: HOSPITAL | Age: 66
End: 2021-09-15
Payer: MEDICAID

## 2021-09-22 ENCOUNTER — TELEPHONE (OUTPATIENT)
Dept: UROLOGY | Facility: CLINIC | Age: 66
End: 2021-09-22

## 2021-09-23 ENCOUNTER — TELEPHONE (OUTPATIENT)
Dept: GASTROENTEROLOGY | Facility: CLINIC | Age: 66
End: 2021-09-23

## 2021-09-23 ENCOUNTER — HOSPITAL ENCOUNTER (EMERGENCY)
Facility: HOSPITAL | Age: 66
Discharge: HOME | End: 2021-09-23
Attending: EMERGENCY MEDICINE
Payer: MEDICARE

## 2021-09-23 VITALS
DIASTOLIC BLOOD PRESSURE: 56 MMHG | BODY MASS INDEX: 33.63 KG/M2 | OXYGEN SATURATION: 99 % | RESPIRATION RATE: 13 BRPM | WEIGHT: 197 LBS | TEMPERATURE: 97 F | HEIGHT: 64 IN | HEART RATE: 70 BPM | SYSTOLIC BLOOD PRESSURE: 111 MMHG

## 2021-09-23 DIAGNOSIS — F41.9 ANXIETY: ICD-10-CM

## 2021-09-23 DIAGNOSIS — G47.01 INSOMNIA DUE TO MEDICAL CONDITION: Chronic | ICD-10-CM

## 2021-09-23 DIAGNOSIS — N30.00 ACUTE CYSTITIS WITHOUT HEMATURIA: Primary | ICD-10-CM

## 2021-09-23 DIAGNOSIS — R10.9 ABDOMINAL PAIN, UNSPECIFIED ABDOMINAL LOCATION: ICD-10-CM

## 2021-09-23 DIAGNOSIS — G47.00 INSOMNIA, UNSPECIFIED TYPE: ICD-10-CM

## 2021-09-23 LAB
ALBUMIN SERPL BCP-MCNC: 4 G/DL (ref 3.5–5.2)
ALP SERPL-CCNC: 133 U/L (ref 55–135)
ALT SERPL W/O P-5'-P-CCNC: 8 U/L (ref 10–44)
ANION GAP SERPL CALC-SCNC: 14 MMOL/L (ref 8–16)
AST SERPL-CCNC: 14 U/L (ref 10–40)
BACTERIA #/AREA URNS HPF: ABNORMAL /HPF
BASOPHILS # BLD AUTO: 0.02 K/UL (ref 0–0.2)
BASOPHILS NFR BLD: 0.2 % (ref 0–1.9)
BILIRUB SERPL-MCNC: 0.4 MG/DL (ref 0.1–1)
BILIRUB UR QL STRIP: NEGATIVE
BUN SERPL-MCNC: 11 MG/DL (ref 8–23)
CALCIUM SERPL-MCNC: 10.1 MG/DL (ref 8.7–10.5)
CHLORIDE SERPL-SCNC: 97 MMOL/L (ref 95–110)
CLARITY UR: ABNORMAL
CO2 SERPL-SCNC: 29 MMOL/L (ref 23–29)
COLOR UR: YELLOW
CREAT SERPL-MCNC: 1.2 MG/DL (ref 0.5–1.4)
DIFFERENTIAL METHOD: ABNORMAL
EOSINOPHIL # BLD AUTO: 0.1 K/UL (ref 0–0.5)
EOSINOPHIL NFR BLD: 0.8 % (ref 0–8)
ERYTHROCYTE [DISTWIDTH] IN BLOOD BY AUTOMATED COUNT: 13.4 % (ref 11.5–14.5)
EST. GFR  (AFRICAN AMERICAN): 55 ML/MIN/1.73 M^2
EST. GFR  (NON AFRICAN AMERICAN): 48 ML/MIN/1.73 M^2
GLUCOSE SERPL-MCNC: 89 MG/DL (ref 70–110)
GLUCOSE UR QL STRIP: NEGATIVE
HCT VFR BLD AUTO: 39.7 % (ref 37–48.5)
HGB BLD-MCNC: 13 G/DL (ref 12–16)
HGB UR QL STRIP: ABNORMAL
IMM GRANULOCYTES # BLD AUTO: 0.02 K/UL (ref 0–0.04)
IMM GRANULOCYTES NFR BLD AUTO: 0.2 % (ref 0–0.5)
KETONES UR QL STRIP: NEGATIVE
LACTATE SERPL-SCNC: 1.5 MMOL/L (ref 0.5–2.2)
LEUKOCYTE ESTERASE UR QL STRIP: ABNORMAL
LIPASE SERPL-CCNC: 15 U/L (ref 4–60)
LYMPHOCYTES # BLD AUTO: 1.2 K/UL (ref 1–4.8)
LYMPHOCYTES NFR BLD: 13.5 % (ref 18–48)
MCH RBC QN AUTO: 27.6 PG (ref 27–31)
MCHC RBC AUTO-ENTMCNC: 32.7 G/DL (ref 32–36)
MCV RBC AUTO: 84 FL (ref 82–98)
MICROSCOPIC COMMENT: ABNORMAL
MONOCYTES # BLD AUTO: 0.7 K/UL (ref 0.3–1)
MONOCYTES NFR BLD: 8.1 % (ref 4–15)
NEUTROPHILS # BLD AUTO: 7.1 K/UL (ref 1.8–7.7)
NEUTROPHILS NFR BLD: 77.2 % (ref 38–73)
NITRITE UR QL STRIP: POSITIVE
NRBC BLD-RTO: 0 /100 WBC
PH UR STRIP: 6 [PH] (ref 5–8)
PLATELET # BLD AUTO: 220 K/UL (ref 150–450)
PMV BLD AUTO: 10.4 FL (ref 9.2–12.9)
POTASSIUM SERPL-SCNC: 3.3 MMOL/L (ref 3.5–5.1)
PROT SERPL-MCNC: 8.2 G/DL (ref 6–8.4)
PROT UR QL STRIP: NEGATIVE
RBC # BLD AUTO: 4.71 M/UL (ref 4–5.4)
RBC #/AREA URNS HPF: 1 /HPF (ref 0–4)
SODIUM SERPL-SCNC: 140 MMOL/L (ref 136–145)
SP GR UR STRIP: 1.01 (ref 1–1.03)
SQUAMOUS #/AREA URNS HPF: 0 /HPF
URN SPEC COLLECT METH UR: ABNORMAL
UROBILINOGEN UR STRIP-ACNC: NEGATIVE EU/DL
WBC # BLD AUTO: 9.16 K/UL (ref 3.9–12.7)
WBC #/AREA URNS HPF: 9 /HPF (ref 0–5)
WBC CLUMPS URNS QL MICRO: ABNORMAL

## 2021-09-23 PROCEDURE — 25000003 PHARM REV CODE 250: Mod: HCNC | Performed by: EMERGENCY MEDICINE

## 2021-09-23 PROCEDURE — 85025 COMPLETE CBC W/AUTO DIFF WBC: CPT | Mod: HCNC | Performed by: EMERGENCY MEDICINE

## 2021-09-23 PROCEDURE — 81000 URINALYSIS NONAUTO W/SCOPE: CPT | Mod: HCNC | Performed by: EMERGENCY MEDICINE

## 2021-09-23 PROCEDURE — 25000003 PHARM REV CODE 250: Mod: HCNC | Performed by: NURSE PRACTITIONER

## 2021-09-23 PROCEDURE — 80053 COMPREHEN METABOLIC PANEL: CPT | Mod: HCNC | Performed by: EMERGENCY MEDICINE

## 2021-09-23 PROCEDURE — 99284 EMERGENCY DEPT VISIT MOD MDM: CPT | Mod: 25,HCNC

## 2021-09-23 PROCEDURE — 96375 TX/PRO/DX INJ NEW DRUG ADDON: CPT | Mod: HCNC

## 2021-09-23 PROCEDURE — 83690 ASSAY OF LIPASE: CPT | Mod: HCNC | Performed by: EMERGENCY MEDICINE

## 2021-09-23 PROCEDURE — 96374 THER/PROPH/DIAG INJ IV PUSH: CPT | Mod: HCNC

## 2021-09-23 PROCEDURE — 63600175 PHARM REV CODE 636 W HCPCS: Mod: HCNC | Performed by: EMERGENCY MEDICINE

## 2021-09-23 PROCEDURE — 83605 ASSAY OF LACTIC ACID: CPT | Mod: HCNC | Performed by: EMERGENCY MEDICINE

## 2021-09-23 RX ORDER — CEPHALEXIN 500 MG/1
500 CAPSULE ORAL EVERY 8 HOURS
Qty: 21 CAPSULE | Refills: 0 | Status: SHIPPED | OUTPATIENT
Start: 2021-09-23 | End: 2021-09-30

## 2021-09-23 RX ORDER — ONDANSETRON 2 MG/ML
4 INJECTION INTRAMUSCULAR; INTRAVENOUS
Status: COMPLETED | OUTPATIENT
Start: 2021-09-23 | End: 2021-09-23

## 2021-09-23 RX ORDER — TRAZODONE HYDROCHLORIDE 100 MG/1
300 TABLET ORAL NIGHTLY
Qty: 270 TABLET | Refills: 1 | Status: SHIPPED | OUTPATIENT
Start: 2021-09-23 | End: 2021-12-30 | Stop reason: SDUPTHER

## 2021-09-23 RX ORDER — DICYCLOMINE HYDROCHLORIDE 10 MG/1
20 CAPSULE ORAL
Status: COMPLETED | OUTPATIENT
Start: 2021-09-23 | End: 2021-09-23

## 2021-09-23 RX ORDER — MORPHINE SULFATE 4 MG/ML
4 INJECTION, SOLUTION INTRAMUSCULAR; INTRAVENOUS
Status: COMPLETED | OUTPATIENT
Start: 2021-09-23 | End: 2021-09-23

## 2021-09-23 RX ORDER — KETOROLAC TROMETHAMINE 30 MG/ML
15 INJECTION, SOLUTION INTRAMUSCULAR; INTRAVENOUS
Status: COMPLETED | OUTPATIENT
Start: 2021-09-23 | End: 2021-09-23

## 2021-09-23 RX ORDER — FLUOXETINE HYDROCHLORIDE 20 MG/1
60 CAPSULE ORAL DAILY
Qty: 270 CAPSULE | Refills: 1 | Status: SHIPPED | OUTPATIENT
Start: 2021-09-23 | End: 2021-12-30 | Stop reason: SDUPTHER

## 2021-09-23 RX ORDER — QUETIAPINE FUMARATE 100 MG/1
200 TABLET, FILM COATED ORAL NIGHTLY
Qty: 180 TABLET | Refills: 1 | Status: SHIPPED | OUTPATIENT
Start: 2021-09-23 | End: 2021-12-03 | Stop reason: SDUPTHER

## 2021-09-23 RX ORDER — QUETIAPINE FUMARATE 25 MG/1
TABLET, FILM COATED ORAL
Qty: 180 TABLET | Refills: 1 | Status: SHIPPED | OUTPATIENT
Start: 2021-09-23 | End: 2021-12-03 | Stop reason: SDUPTHER

## 2021-09-23 RX ORDER — CEPHALEXIN 500 MG/1
500 CAPSULE ORAL
Status: COMPLETED | OUTPATIENT
Start: 2021-09-23 | End: 2021-09-23

## 2021-09-23 RX ADMIN — MORPHINE SULFATE 4 MG: 4 INJECTION, SOLUTION INTRAMUSCULAR; INTRAVENOUS at 06:09

## 2021-09-23 RX ADMIN — CEPHALEXIN 500 MG: 500 CAPSULE ORAL at 09:09

## 2021-09-23 RX ADMIN — ONDANSETRON 4 MG: 2 INJECTION INTRAMUSCULAR; INTRAVENOUS at 06:09

## 2021-09-23 RX ADMIN — KETOROLAC TROMETHAMINE 15 MG: 30 INJECTION, SOLUTION INTRAMUSCULAR at 09:09

## 2021-09-23 RX ADMIN — DICYCLOMINE HYDROCHLORIDE 20 MG: 10 CAPSULE ORAL at 09:09

## 2021-09-24 ENCOUNTER — TELEPHONE (OUTPATIENT)
Dept: UROLOGY | Facility: CLINIC | Age: 66
End: 2021-09-24

## 2021-09-24 DIAGNOSIS — N31.9 NEUROGENIC BLADDER: Chronic | ICD-10-CM

## 2021-09-24 RX ORDER — OXYBUTYNIN CHLORIDE 15 MG/1
15 TABLET, EXTENDED RELEASE ORAL DAILY
Qty: 90 TABLET | Refills: 3 | Status: SHIPPED | OUTPATIENT
Start: 2021-09-24 | End: 2021-12-03 | Stop reason: SDUPTHER

## 2021-09-27 ENCOUNTER — OFFICE VISIT (OUTPATIENT)
Dept: UROLOGY | Facility: CLINIC | Age: 66
End: 2021-09-27
Payer: MEDICARE

## 2021-09-27 VITALS
HEIGHT: 64 IN | WEIGHT: 172 LBS | BODY MASS INDEX: 29.37 KG/M2 | SYSTOLIC BLOOD PRESSURE: 95 MMHG | HEART RATE: 58 BPM | DIASTOLIC BLOOD PRESSURE: 61 MMHG

## 2021-09-27 DIAGNOSIS — N31.9 NEUROGENIC BLADDER: ICD-10-CM

## 2021-09-27 DIAGNOSIS — T83.510D URINARY TRACT INFECTION ASSOCIATED WITH CYSTOSTOMY CATHETER, SUBSEQUENT ENCOUNTER: Primary | ICD-10-CM

## 2021-09-27 DIAGNOSIS — N39.0 URINARY TRACT INFECTION ASSOCIATED WITH CYSTOSTOMY CATHETER, SUBSEQUENT ENCOUNTER: Primary | ICD-10-CM

## 2021-09-27 PROCEDURE — 3044F HG A1C LEVEL LT 7.0%: CPT | Mod: HCNC,CPTII,S$GLB, | Performed by: UROLOGY

## 2021-09-27 PROCEDURE — 99999 PR PBB SHADOW E&M-EST. PATIENT-LVL III: CPT | Mod: PBBFAC,HCNC,, | Performed by: UROLOGY

## 2021-09-27 PROCEDURE — 99214 PR OFFICE/OUTPT VISIT, EST, LEVL IV, 30-39 MIN: ICD-10-PCS | Mod: HCNC,S$GLB,, | Performed by: UROLOGY

## 2021-09-27 PROCEDURE — 3074F SYST BP LT 130 MM HG: CPT | Mod: HCNC,CPTII,S$GLB, | Performed by: UROLOGY

## 2021-09-27 PROCEDURE — 99999 PR PBB SHADOW E&M-EST. PATIENT-LVL III: ICD-10-PCS | Mod: PBBFAC,HCNC,, | Performed by: UROLOGY

## 2021-09-27 PROCEDURE — 1159F PR MEDICATION LIST DOCUMENTED IN MEDICAL RECORD: ICD-10-PCS | Mod: HCNC,CPTII,S$GLB, | Performed by: UROLOGY

## 2021-09-27 PROCEDURE — 3008F PR BODY MASS INDEX (BMI) DOCUMENTED: ICD-10-PCS | Mod: HCNC,CPTII,S$GLB, | Performed by: UROLOGY

## 2021-09-27 PROCEDURE — 99214 OFFICE O/P EST MOD 30 MIN: CPT | Mod: HCNC,S$GLB,, | Performed by: UROLOGY

## 2021-09-27 PROCEDURE — 3288F FALL RISK ASSESSMENT DOCD: CPT | Mod: HCNC,CPTII,S$GLB, | Performed by: UROLOGY

## 2021-09-27 PROCEDURE — 1101F PT FALLS ASSESS-DOCD LE1/YR: CPT | Mod: HCNC,CPTII,S$GLB, | Performed by: UROLOGY

## 2021-09-27 PROCEDURE — 3288F PR FALLS RISK ASSESSMENT DOCUMENTED: ICD-10-PCS | Mod: HCNC,CPTII,S$GLB, | Performed by: UROLOGY

## 2021-09-27 PROCEDURE — 3078F DIAST BP <80 MM HG: CPT | Mod: HCNC,CPTII,S$GLB, | Performed by: UROLOGY

## 2021-09-27 PROCEDURE — 1101F PR PT FALLS ASSESS DOC 0-1 FALLS W/OUT INJ PAST YR: ICD-10-PCS | Mod: HCNC,CPTII,S$GLB, | Performed by: UROLOGY

## 2021-09-27 PROCEDURE — 3074F PR MOST RECENT SYSTOLIC BLOOD PRESSURE < 130 MM HG: ICD-10-PCS | Mod: HCNC,CPTII,S$GLB, | Performed by: UROLOGY

## 2021-09-27 PROCEDURE — 1159F MED LIST DOCD IN RCRD: CPT | Mod: HCNC,CPTII,S$GLB, | Performed by: UROLOGY

## 2021-09-27 PROCEDURE — 3078F PR MOST RECENT DIASTOLIC BLOOD PRESSURE < 80 MM HG: ICD-10-PCS | Mod: HCNC,CPTII,S$GLB, | Performed by: UROLOGY

## 2021-09-27 PROCEDURE — 3008F BODY MASS INDEX DOCD: CPT | Mod: HCNC,CPTII,S$GLB, | Performed by: UROLOGY

## 2021-09-27 PROCEDURE — 3044F PR MOST RECENT HEMOGLOBIN A1C LEVEL <7.0%: ICD-10-PCS | Mod: HCNC,CPTII,S$GLB, | Performed by: UROLOGY

## 2021-09-27 RX ORDER — DOXYCYCLINE 100 MG/1
100 CAPSULE ORAL 2 TIMES DAILY
Qty: 14 CAPSULE | Refills: 0 | Status: SHIPPED | OUTPATIENT
Start: 2021-09-27 | End: 2021-10-04

## 2021-09-28 ENCOUNTER — TELEPHONE (OUTPATIENT)
Dept: UROLOGY | Facility: CLINIC | Age: 66
End: 2021-09-28

## 2021-10-04 ENCOUNTER — TELEPHONE (OUTPATIENT)
Dept: UROLOGY | Facility: CLINIC | Age: 66
End: 2021-10-04

## 2021-10-04 ENCOUNTER — TELEPHONE (OUTPATIENT)
Dept: INTERNAL MEDICINE | Facility: CLINIC | Age: 66
End: 2021-10-04

## 2021-10-06 ENCOUNTER — DOCUMENT SCAN (OUTPATIENT)
Dept: HOME HEALTH SERVICES | Facility: HOSPITAL | Age: 66
End: 2021-10-06
Payer: MEDICAID

## 2021-10-18 RX ORDER — PANTOPRAZOLE SODIUM 40 MG/1
TABLET, DELAYED RELEASE ORAL
Qty: 90 TABLET | Refills: 3 | Status: SHIPPED | OUTPATIENT
Start: 2021-10-18 | End: 2021-11-15

## 2021-10-20 PROCEDURE — G0179 PR HOME HEALTH MD RECERTIFICATION: ICD-10-PCS | Mod: ,,, | Performed by: UROLOGY

## 2021-10-20 PROCEDURE — G0179 MD RECERTIFICATION HHA PT: HCPCS | Mod: ,,, | Performed by: UROLOGY

## 2021-10-25 ENCOUNTER — TELEPHONE (OUTPATIENT)
Dept: UROLOGY | Facility: CLINIC | Age: 66
End: 2021-10-25
Payer: MEDICAID

## 2021-10-25 DIAGNOSIS — N39.0 URINARY TRACT INFECTION ASSOCIATED WITH CYSTOSTOMY CATHETER, SUBSEQUENT ENCOUNTER: Primary | ICD-10-CM

## 2021-10-25 DIAGNOSIS — T83.510D URINARY TRACT INFECTION ASSOCIATED WITH CYSTOSTOMY CATHETER, SUBSEQUENT ENCOUNTER: Primary | ICD-10-CM

## 2021-10-26 ENCOUNTER — TELEPHONE (OUTPATIENT)
Dept: DERMATOLOGY | Facility: CLINIC | Age: 66
End: 2021-10-26
Payer: MEDICAID

## 2021-10-27 RX ORDER — CEFPODOXIME PROXETIL 200 MG/1
200 TABLET, FILM COATED ORAL 2 TIMES DAILY
Qty: 14 TABLET | Refills: 0 | Status: SHIPPED | OUTPATIENT
Start: 2021-10-27 | End: 2021-11-05 | Stop reason: ALTCHOICE

## 2021-10-28 ENCOUNTER — EXTERNAL HOME HEALTH (OUTPATIENT)
Dept: HOME HEALTH SERVICES | Facility: HOSPITAL | Age: 66
End: 2021-10-28
Payer: MEDICARE

## 2021-11-01 ENCOUNTER — TELEPHONE (OUTPATIENT)
Dept: OTOLARYNGOLOGY | Facility: CLINIC | Age: 66
End: 2021-11-01
Payer: MEDICARE

## 2021-11-02 ENCOUNTER — TELEPHONE (OUTPATIENT)
Dept: UROLOGY | Facility: CLINIC | Age: 66
End: 2021-11-02
Payer: MEDICARE

## 2021-11-05 ENCOUNTER — OFFICE VISIT (OUTPATIENT)
Dept: UROLOGY | Facility: CLINIC | Age: 66
End: 2021-11-05
Payer: MEDICARE

## 2021-11-05 ENCOUNTER — TELEPHONE (OUTPATIENT)
Dept: UROLOGY | Facility: CLINIC | Age: 66
End: 2021-11-05

## 2021-11-05 VITALS
BODY MASS INDEX: 30.48 KG/M2 | HEART RATE: 64 BPM | WEIGHT: 178.56 LBS | HEIGHT: 64 IN | DIASTOLIC BLOOD PRESSURE: 54 MMHG | SYSTOLIC BLOOD PRESSURE: 86 MMHG

## 2021-11-05 DIAGNOSIS — N31.9 NEUROGENIC BLADDER: Primary | ICD-10-CM

## 2021-11-05 DIAGNOSIS — N39.0 RECURRENT UTI: ICD-10-CM

## 2021-11-05 PROCEDURE — 3074F PR MOST RECENT SYSTOLIC BLOOD PRESSURE < 130 MM HG: ICD-10-PCS | Mod: HCNC,CPTII,S$GLB, | Performed by: UROLOGY

## 2021-11-05 PROCEDURE — 3078F DIAST BP <80 MM HG: CPT | Mod: HCNC,CPTII,S$GLB, | Performed by: UROLOGY

## 2021-11-05 PROCEDURE — 3044F PR MOST RECENT HEMOGLOBIN A1C LEVEL <7.0%: ICD-10-PCS | Mod: HCNC,CPTII,S$GLB, | Performed by: UROLOGY

## 2021-11-05 PROCEDURE — 3008F BODY MASS INDEX DOCD: CPT | Mod: HCNC,CPTII,S$GLB, | Performed by: UROLOGY

## 2021-11-05 PROCEDURE — 87077 CULTURE AEROBIC IDENTIFY: CPT | Mod: HCNC | Performed by: UROLOGY

## 2021-11-05 PROCEDURE — 51705 CHANGE OF BLADDER TUBE: CPT | Mod: HCNC,S$GLB,, | Performed by: UROLOGY

## 2021-11-05 PROCEDURE — 99999 PR PBB SHADOW E&M-EST. PATIENT-LVL V: ICD-10-PCS | Mod: PBBFAC,HCNC,, | Performed by: UROLOGY

## 2021-11-05 PROCEDURE — 3044F HG A1C LEVEL LT 7.0%: CPT | Mod: HCNC,CPTII,S$GLB, | Performed by: UROLOGY

## 2021-11-05 PROCEDURE — 1159F PR MEDICATION LIST DOCUMENTED IN MEDICAL RECORD: ICD-10-PCS | Mod: HCNC,CPTII,S$GLB, | Performed by: UROLOGY

## 2021-11-05 PROCEDURE — 1159F MED LIST DOCD IN RCRD: CPT | Mod: HCNC,CPTII,S$GLB, | Performed by: UROLOGY

## 2021-11-05 PROCEDURE — 99999 PR PBB SHADOW E&M-EST. PATIENT-LVL V: CPT | Mod: PBBFAC,HCNC,, | Performed by: UROLOGY

## 2021-11-05 PROCEDURE — 87088 URINE BACTERIA CULTURE: CPT | Mod: HCNC | Performed by: UROLOGY

## 2021-11-05 PROCEDURE — 87186 SC STD MICRODIL/AGAR DIL: CPT | Mod: HCNC | Performed by: UROLOGY

## 2021-11-05 PROCEDURE — 3008F PR BODY MASS INDEX (BMI) DOCUMENTED: ICD-10-PCS | Mod: HCNC,CPTII,S$GLB, | Performed by: UROLOGY

## 2021-11-05 PROCEDURE — 51705 PR CHANGE OF BLADDER TUBE,SIMPLE: ICD-10-PCS | Mod: HCNC,S$GLB,, | Performed by: UROLOGY

## 2021-11-05 PROCEDURE — 99214 PR OFFICE/OUTPT VISIT, EST, LEVL IV, 30-39 MIN: ICD-10-PCS | Mod: HCNC,25,S$GLB, | Performed by: UROLOGY

## 2021-11-05 PROCEDURE — 3078F PR MOST RECENT DIASTOLIC BLOOD PRESSURE < 80 MM HG: ICD-10-PCS | Mod: HCNC,CPTII,S$GLB, | Performed by: UROLOGY

## 2021-11-05 PROCEDURE — 3074F SYST BP LT 130 MM HG: CPT | Mod: HCNC,CPTII,S$GLB, | Performed by: UROLOGY

## 2021-11-05 PROCEDURE — 99214 OFFICE O/P EST MOD 30 MIN: CPT | Mod: HCNC,25,S$GLB, | Performed by: UROLOGY

## 2021-11-05 PROCEDURE — 87086 URINE CULTURE/COLONY COUNT: CPT | Mod: HCNC | Performed by: UROLOGY

## 2021-11-05 RX ORDER — CEPHALEXIN 500 MG/1
500 CAPSULE ORAL EVERY 8 HOURS
Qty: 36 CAPSULE | Refills: 0 | Status: SHIPPED | OUTPATIENT
Start: 2021-11-05 | End: 2021-11-15 | Stop reason: CLARIF

## 2021-11-08 ENCOUNTER — PATIENT OUTREACH (OUTPATIENT)
Dept: ADMINISTRATIVE | Facility: OTHER | Age: 66
End: 2021-11-08
Payer: MEDICARE

## 2021-11-08 ENCOUNTER — TELEPHONE (OUTPATIENT)
Dept: UROLOGY | Facility: CLINIC | Age: 66
End: 2021-11-08
Payer: MEDICARE

## 2021-11-08 DIAGNOSIS — N31.9 NEUROGENIC BLADDER: Primary | ICD-10-CM

## 2021-11-08 LAB — BACTERIA UR CULT: ABNORMAL

## 2021-11-09 ENCOUNTER — TELEPHONE (OUTPATIENT)
Dept: OPHTHALMOLOGY | Facility: CLINIC | Age: 66
End: 2021-11-09
Payer: MEDICARE

## 2021-11-09 ENCOUNTER — OFFICE VISIT (OUTPATIENT)
Dept: OTOLARYNGOLOGY | Facility: CLINIC | Age: 66
End: 2021-11-09
Payer: MEDICARE

## 2021-11-09 ENCOUNTER — CLINICAL SUPPORT (OUTPATIENT)
Dept: AUDIOLOGY | Facility: CLINIC | Age: 66
End: 2021-11-09
Payer: MEDICARE

## 2021-11-09 VITALS — BODY MASS INDEX: 30.73 KG/M2 | WEIGHT: 179 LBS

## 2021-11-09 DIAGNOSIS — H90.A21 SENSORINEURAL HEARING LOSS (SNHL) OF RIGHT EAR WITH RESTRICTED HEARING OF LEFT EAR: Primary | ICD-10-CM

## 2021-11-09 DIAGNOSIS — H91.91 DEAFNESS IN RIGHT EAR: ICD-10-CM

## 2021-11-09 DIAGNOSIS — H93.299 REDUCED SPEECH DISCRIMINATION: ICD-10-CM

## 2021-11-09 DIAGNOSIS — H90.3 SENSORINEURAL HEARING LOSS, BILATERAL: Primary | ICD-10-CM

## 2021-11-09 PROCEDURE — 1101F PR PT FALLS ASSESS DOC 0-1 FALLS W/OUT INJ PAST YR: ICD-10-PCS | Mod: CPTII,S$GLB,, | Performed by: OTOLARYNGOLOGY

## 2021-11-09 PROCEDURE — 3008F BODY MASS INDEX DOCD: CPT | Mod: CPTII,S$GLB,, | Performed by: OTOLARYNGOLOGY

## 2021-11-09 PROCEDURE — 92557 COMPREHENSIVE HEARING TEST: CPT | Mod: S$GLB,,, | Performed by: AUDIOLOGIST

## 2021-11-09 PROCEDURE — 92567 PR TYMPA2METRY: ICD-10-PCS | Mod: S$GLB,,, | Performed by: AUDIOLOGIST

## 2021-11-09 PROCEDURE — 3044F PR MOST RECENT HEMOGLOBIN A1C LEVEL <7.0%: ICD-10-PCS | Mod: CPTII,S$GLB,, | Performed by: OTOLARYNGOLOGY

## 2021-11-09 PROCEDURE — 3288F FALL RISK ASSESSMENT DOCD: CPT | Mod: CPTII,S$GLB,, | Performed by: OTOLARYNGOLOGY

## 2021-11-09 PROCEDURE — 1159F MED LIST DOCD IN RCRD: CPT | Mod: CPTII,S$GLB,, | Performed by: OTOLARYNGOLOGY

## 2021-11-09 PROCEDURE — 3044F HG A1C LEVEL LT 7.0%: CPT | Mod: CPTII,S$GLB,, | Performed by: OTOLARYNGOLOGY

## 2021-11-09 PROCEDURE — 99214 PR OFFICE/OUTPT VISIT, EST, LEVL IV, 30-39 MIN: ICD-10-PCS | Mod: S$GLB,,, | Performed by: OTOLARYNGOLOGY

## 2021-11-09 PROCEDURE — 92557 PR COMPREHENSIVE HEARING TEST: ICD-10-PCS | Mod: S$GLB,,, | Performed by: AUDIOLOGIST

## 2021-11-09 PROCEDURE — 92567 TYMPANOMETRY: CPT | Mod: S$GLB,,, | Performed by: AUDIOLOGIST

## 2021-11-09 PROCEDURE — 99999 PR PBB SHADOW E&M-EST. PATIENT-LVL III: CPT | Mod: PBBFAC,,, | Performed by: OTOLARYNGOLOGY

## 2021-11-09 PROCEDURE — 3288F PR FALLS RISK ASSESSMENT DOCUMENTED: ICD-10-PCS | Mod: CPTII,S$GLB,, | Performed by: OTOLARYNGOLOGY

## 2021-11-09 PROCEDURE — 3008F PR BODY MASS INDEX (BMI) DOCUMENTED: ICD-10-PCS | Mod: CPTII,S$GLB,, | Performed by: OTOLARYNGOLOGY

## 2021-11-09 PROCEDURE — 1101F PT FALLS ASSESS-DOCD LE1/YR: CPT | Mod: CPTII,S$GLB,, | Performed by: OTOLARYNGOLOGY

## 2021-11-09 PROCEDURE — 1159F PR MEDICATION LIST DOCUMENTED IN MEDICAL RECORD: ICD-10-PCS | Mod: CPTII,S$GLB,, | Performed by: OTOLARYNGOLOGY

## 2021-11-09 PROCEDURE — 99214 OFFICE O/P EST MOD 30 MIN: CPT | Mod: S$GLB,,, | Performed by: OTOLARYNGOLOGY

## 2021-11-09 PROCEDURE — 99999 PR PBB SHADOW E&M-EST. PATIENT-LVL III: ICD-10-PCS | Mod: PBBFAC,,, | Performed by: OTOLARYNGOLOGY

## 2021-11-12 ENCOUNTER — TELEPHONE (OUTPATIENT)
Dept: UROLOGY | Facility: CLINIC | Age: 66
End: 2021-11-12
Payer: MEDICARE

## 2021-11-15 NOTE — PROGRESS NOTES
"7/24/18 Summary: Follow-up call with patient's spouse Dangelo and patient on speaker phone. Reports catheter is barely draining any urine, it's foul smelling, cloudy, with some clots and very painful. Patient on her way to see Urology to get evaluated. Patient reports "I want this catheter out".     Interventions: Will follow-up tomorrow to review and reiterate Urology appt from this PM.     Plan:  UTI/Suprapubic Catheter teaching  Lymphedema teaching   Low sodium diet  Fall Prevention in Home  Advanced Directives mailed 6/19  [employer] Over [employer] Counter Health and Wellness Catalog - mailed 6/19 -DONE receiving benefit 6/27/18  Pill Box mailed 6/19 - received 6/27/18  Refer to [facility] LCSW Vinay Mota to assist with resources - DONE  " Detail Level: Zone Additional Notes: Left distal lower leg Render Risk Assessment In Note?: no Detail Level: Simple Additional Notes: Patient consent was obtained to proceed with the visit and recommended plan of care after discussion of all risks and benefits, including the risks of COVID-19 exposure.

## 2021-11-16 ENCOUNTER — ANESTHESIA (OUTPATIENT)
Dept: SURGERY | Facility: HOSPITAL | Age: 66
End: 2021-11-16
Payer: MEDICARE

## 2021-11-16 ENCOUNTER — HOSPITAL ENCOUNTER (OUTPATIENT)
Facility: HOSPITAL | Age: 66
Discharge: HOME | End: 2021-11-16
Attending: UROLOGY | Admitting: UROLOGY
Payer: MEDICARE

## 2021-11-16 ENCOUNTER — ANESTHESIA EVENT (OUTPATIENT)
Dept: SURGERY | Facility: HOSPITAL | Age: 66
End: 2021-11-16
Payer: MEDICARE

## 2021-11-16 VITALS
DIASTOLIC BLOOD PRESSURE: 62 MMHG | OXYGEN SATURATION: 99 % | RESPIRATION RATE: 16 BRPM | HEIGHT: 64 IN | WEIGHT: 179 LBS | HEART RATE: 55 BPM | BODY MASS INDEX: 30.56 KG/M2 | SYSTOLIC BLOOD PRESSURE: 126 MMHG | TEMPERATURE: 97 F

## 2021-11-16 DIAGNOSIS — N31.9 NEUROGENIC BLADDER: Primary | ICD-10-CM

## 2021-11-16 PROCEDURE — 52287 CYSTOSCOPY CHEMODENERVATION: CPT | Mod: ,,, | Performed by: UROLOGY

## 2021-11-16 PROCEDURE — 71000044 HC DOSC ROUTINE RECOVERY FIRST HOUR: Performed by: UROLOGY

## 2021-11-16 PROCEDURE — 63600175 PHARM REV CODE 636 W HCPCS: Performed by: NURSE ANESTHETIST, CERTIFIED REGISTERED

## 2021-11-16 PROCEDURE — 25000003 PHARM REV CODE 250: Performed by: NURSE ANESTHETIST, CERTIFIED REGISTERED

## 2021-11-16 PROCEDURE — 36000707: Performed by: UROLOGY

## 2021-11-16 PROCEDURE — D9220A PRA ANESTHESIA: Mod: CRNA,,, | Performed by: NURSE ANESTHETIST, CERTIFIED REGISTERED

## 2021-11-16 PROCEDURE — 52287 PR CYSTOURETHROSCOPY WITH INJ FOR CHEMODENERVATION: ICD-10-PCS | Mod: ,,, | Performed by: UROLOGY

## 2021-11-16 PROCEDURE — 63600175 PHARM REV CODE 636 W HCPCS: Mod: JG | Performed by: UROLOGY

## 2021-11-16 PROCEDURE — 63600175 PHARM REV CODE 636 W HCPCS: Performed by: STUDENT IN AN ORGANIZED HEALTH CARE EDUCATION/TRAINING PROGRAM

## 2021-11-16 PROCEDURE — D9220A PRA ANESTHESIA: ICD-10-PCS | Mod: ANES,,, | Performed by: ANESTHESIOLOGY

## 2021-11-16 PROCEDURE — 25000003 PHARM REV CODE 250: Performed by: UROLOGY

## 2021-11-16 PROCEDURE — 71000015 HC POSTOP RECOV 1ST HR: Performed by: UROLOGY

## 2021-11-16 PROCEDURE — D9220A PRA ANESTHESIA: ICD-10-PCS | Mod: CRNA,,, | Performed by: NURSE ANESTHETIST, CERTIFIED REGISTERED

## 2021-11-16 PROCEDURE — 36000706: Performed by: UROLOGY

## 2021-11-16 PROCEDURE — D9220A PRA ANESTHESIA: Mod: ANES,,, | Performed by: ANESTHESIOLOGY

## 2021-11-16 PROCEDURE — 37000008 HC ANESTHESIA 1ST 15 MINUTES: Performed by: UROLOGY

## 2021-11-16 PROCEDURE — 37000009 HC ANESTHESIA EA ADD 15 MINS: Performed by: UROLOGY

## 2021-11-16 PROCEDURE — 51705 CHANGE OF BLADDER TUBE: CPT | Mod: 51,,, | Performed by: UROLOGY

## 2021-11-16 PROCEDURE — 51705 PR CHANGE OF BLADDER TUBE,SIMPLE: ICD-10-PCS | Mod: 51,,, | Performed by: UROLOGY

## 2021-11-16 RX ORDER — EPHEDRINE SULFATE 50 MG/ML
INJECTION, SOLUTION INTRAVENOUS
Status: DISCONTINUED | OUTPATIENT
Start: 2021-11-16 | End: 2021-11-16

## 2021-11-16 RX ORDER — CIPROFLOXACIN 500 MG/1
500 TABLET ORAL EVERY 12 HOURS
Qty: 4 TABLET | Refills: 0 | Status: SHIPPED | OUTPATIENT
Start: 2021-11-16 | End: 2021-11-18

## 2021-11-16 RX ORDER — LIDOCAINE HYDROCHLORIDE 20 MG/ML
INJECTION INTRAVENOUS
Status: DISCONTINUED | OUTPATIENT
Start: 2021-11-16 | End: 2021-11-16

## 2021-11-16 RX ORDER — MIDAZOLAM HYDROCHLORIDE 1 MG/ML
INJECTION, SOLUTION INTRAMUSCULAR; INTRAVENOUS
Status: DISCONTINUED | OUTPATIENT
Start: 2021-11-16 | End: 2021-11-16

## 2021-11-16 RX ORDER — CEFTRIAXONE 1 G/1
1 INJECTION, POWDER, FOR SOLUTION INTRAMUSCULAR; INTRAVENOUS
Status: COMPLETED | OUTPATIENT
Start: 2021-11-16 | End: 2021-11-16

## 2021-11-16 RX ORDER — KETAMINE HCL IN 0.9 % NACL 50 MG/5 ML
SYRINGE (ML) INTRAVENOUS
Status: DISCONTINUED | OUTPATIENT
Start: 2021-11-16 | End: 2021-11-16

## 2021-11-16 RX ORDER — ONDANSETRON 2 MG/ML
4 INJECTION INTRAMUSCULAR; INTRAVENOUS DAILY PRN
Status: DISCONTINUED | OUTPATIENT
Start: 2021-11-16 | End: 2021-11-16 | Stop reason: HOSPADM

## 2021-11-16 RX ORDER — LIDOCAINE HYDROCHLORIDE 20 MG/ML
JELLY TOPICAL
Status: DISCONTINUED | OUTPATIENT
Start: 2021-11-16 | End: 2021-11-16 | Stop reason: HOSPADM

## 2021-11-16 RX ORDER — PROPOFOL 10 MG/ML
VIAL (ML) INTRAVENOUS CONTINUOUS PRN
Status: DISCONTINUED | OUTPATIENT
Start: 2021-11-16 | End: 2021-11-16

## 2021-11-16 RX ORDER — PROPOFOL 10 MG/ML
VIAL (ML) INTRAVENOUS
Status: DISCONTINUED | OUTPATIENT
Start: 2021-11-16 | End: 2021-11-16

## 2021-11-16 RX ADMIN — LIDOCAINE HYDROCHLORIDE 20 MG: 20 INJECTION, SOLUTION INTRAVENOUS at 12:11

## 2021-11-16 RX ADMIN — MIDAZOLAM HYDROCHLORIDE 2 MG: 1 INJECTION, SOLUTION INTRAMUSCULAR; INTRAVENOUS at 12:11

## 2021-11-16 RX ADMIN — Medication 10 MG: at 12:11

## 2021-11-16 RX ADMIN — SODIUM CHLORIDE: 9 INJECTION, SOLUTION INTRAVENOUS at 12:11

## 2021-11-16 RX ADMIN — CEFTRIAXONE 1 G: 1 INJECTION, POWDER, FOR SOLUTION INTRAMUSCULAR; INTRAVENOUS at 12:11

## 2021-11-16 RX ADMIN — Medication 150 MCG/KG/MIN: at 12:11

## 2021-11-16 RX ADMIN — Medication 20 MG: at 12:11

## 2021-11-16 RX ADMIN — EPHEDRINE SULFATE 10 MG: 50 INJECTION INTRAVENOUS at 12:11

## 2021-11-16 RX ADMIN — PROPOFOL 30 MG: 10 INJECTION, EMULSION INTRAVENOUS at 12:11

## 2021-11-24 ENCOUNTER — OFFICE VISIT (OUTPATIENT)
Dept: UROLOGY | Facility: CLINIC | Age: 66
End: 2021-11-24
Payer: MEDICARE

## 2021-11-24 DIAGNOSIS — N31.9 NEUROGENIC BLADDER: ICD-10-CM

## 2021-11-24 DIAGNOSIS — Z98.890 POST-OPERATIVE STATE: Primary | ICD-10-CM

## 2021-11-24 PROCEDURE — 99024 POSTOP FOLLOW-UP VISIT: CPT | Mod: 95,,, | Performed by: UROLOGY

## 2021-11-24 PROCEDURE — 99024 PR POST-OP FOLLOW-UP VISIT: ICD-10-PCS | Mod: 95,,, | Performed by: UROLOGY

## 2021-12-01 ENCOUNTER — TELEPHONE (OUTPATIENT)
Dept: UROLOGY | Facility: CLINIC | Age: 66
End: 2021-12-01
Payer: MEDICARE

## 2021-12-03 ENCOUNTER — DOCUMENT SCAN (OUTPATIENT)
Dept: HOME HEALTH SERVICES | Facility: HOSPITAL | Age: 66
End: 2021-12-03
Payer: MEDICARE

## 2021-12-03 DIAGNOSIS — G47.01 INSOMNIA DUE TO MEDICAL CONDITION: Chronic | ICD-10-CM

## 2021-12-03 DIAGNOSIS — E03.9 PRIMARY HYPOTHYROIDISM: Chronic | ICD-10-CM

## 2021-12-03 DIAGNOSIS — N31.9 NEUROGENIC BLADDER: Chronic | ICD-10-CM

## 2021-12-06 RX ORDER — QUETIAPINE FUMARATE 100 MG/1
200 TABLET, FILM COATED ORAL NIGHTLY
Qty: 180 TABLET | Refills: 3 | Status: SHIPPED | OUTPATIENT
Start: 2021-12-06 | End: 2022-04-27

## 2021-12-06 RX ORDER — LEVOTHYROXINE SODIUM 125 UG/1
125 TABLET ORAL
Qty: 90 TABLET | Refills: 3 | Status: SHIPPED | OUTPATIENT
Start: 2021-12-06 | End: 2022-07-22 | Stop reason: SDUPTHER

## 2021-12-06 RX ORDER — QUETIAPINE FUMARATE 25 MG/1
TABLET, FILM COATED ORAL
Qty: 180 TABLET | Refills: 1 | Status: SHIPPED | OUTPATIENT
Start: 2021-12-06 | End: 2022-04-29

## 2021-12-06 RX ORDER — POTASSIUM CHLORIDE 750 MG/1
CAPSULE, EXTENDED RELEASE ORAL
Qty: 180 CAPSULE | Refills: 3 | Status: SHIPPED | OUTPATIENT
Start: 2021-12-06 | End: 2022-03-22

## 2021-12-06 RX ORDER — OXYBUTYNIN CHLORIDE 15 MG/1
15 TABLET, EXTENDED RELEASE ORAL DAILY
Qty: 90 TABLET | Refills: 3 | Status: SHIPPED | OUTPATIENT
Start: 2021-12-06 | End: 2022-06-23 | Stop reason: ALTCHOICE

## 2021-12-14 ENCOUNTER — TELEPHONE (OUTPATIENT)
Dept: UROLOGY | Facility: CLINIC | Age: 66
End: 2021-12-14
Payer: MEDICARE

## 2021-12-14 RX ORDER — FLUCONAZOLE 150 MG/1
150 TABLET ORAL DAILY
Qty: 1 TABLET | Refills: 0 | Status: SHIPPED | OUTPATIENT
Start: 2021-12-14 | End: 2021-12-15

## 2021-12-15 ENCOUNTER — TELEPHONE (OUTPATIENT)
Dept: INTERNAL MEDICINE | Facility: CLINIC | Age: 66
End: 2021-12-15
Payer: MEDICARE

## 2021-12-16 ENCOUNTER — TELEPHONE (OUTPATIENT)
Dept: INTERNAL MEDICINE | Facility: CLINIC | Age: 66
End: 2021-12-16
Payer: MEDICARE

## 2021-12-19 PROCEDURE — G0179 MD RECERTIFICATION HHA PT: HCPCS | Mod: ,,, | Performed by: UROLOGY

## 2021-12-19 PROCEDURE — G0179 PR HOME HEALTH MD RECERTIFICATION: ICD-10-PCS | Mod: ,,, | Performed by: UROLOGY

## 2021-12-28 ENCOUNTER — DOCUMENT SCAN (OUTPATIENT)
Dept: HOME HEALTH SERVICES | Facility: HOSPITAL | Age: 66
End: 2021-12-28
Payer: MEDICARE

## 2021-12-28 ENCOUNTER — TELEPHONE (OUTPATIENT)
Dept: UROLOGY | Facility: CLINIC | Age: 66
End: 2021-12-28
Payer: MEDICARE

## 2021-12-28 DIAGNOSIS — N39.0 RECURRENT UTI: Primary | ICD-10-CM

## 2021-12-30 ENCOUNTER — OFFICE VISIT (OUTPATIENT)
Dept: PSYCHIATRY | Facility: CLINIC | Age: 66
End: 2021-12-30
Payer: MEDICARE

## 2021-12-30 VITALS
WEIGHT: 192.81 LBS | DIASTOLIC BLOOD PRESSURE: 49 MMHG | SYSTOLIC BLOOD PRESSURE: 103 MMHG | BODY MASS INDEX: 33.09 KG/M2 | HEART RATE: 48 BPM

## 2021-12-30 DIAGNOSIS — F41.9 ANXIETY: ICD-10-CM

## 2021-12-30 DIAGNOSIS — G47.00 INSOMNIA, UNSPECIFIED TYPE: ICD-10-CM

## 2021-12-30 PROCEDURE — 3074F PR MOST RECENT SYSTOLIC BLOOD PRESSURE < 130 MM HG: ICD-10-PCS | Mod: HCNC,CPTII,S$GLB, | Performed by: PSYCHIATRY & NEUROLOGY

## 2021-12-30 PROCEDURE — 3078F DIAST BP <80 MM HG: CPT | Mod: HCNC,CPTII,S$GLB, | Performed by: PSYCHIATRY & NEUROLOGY

## 2021-12-30 PROCEDURE — 99214 OFFICE O/P EST MOD 30 MIN: CPT | Mod: HCNC,S$GLB,, | Performed by: PSYCHIATRY & NEUROLOGY

## 2021-12-30 PROCEDURE — 3078F PR MOST RECENT DIASTOLIC BLOOD PRESSURE < 80 MM HG: ICD-10-PCS | Mod: HCNC,CPTII,S$GLB, | Performed by: PSYCHIATRY & NEUROLOGY

## 2021-12-30 PROCEDURE — 3008F PR BODY MASS INDEX (BMI) DOCUMENTED: ICD-10-PCS | Mod: HCNC,CPTII,S$GLB, | Performed by: PSYCHIATRY & NEUROLOGY

## 2021-12-30 PROCEDURE — 3074F SYST BP LT 130 MM HG: CPT | Mod: HCNC,CPTII,S$GLB, | Performed by: PSYCHIATRY & NEUROLOGY

## 2021-12-30 PROCEDURE — 3044F PR MOST RECENT HEMOGLOBIN A1C LEVEL <7.0%: ICD-10-PCS | Mod: HCNC,CPTII,S$GLB, | Performed by: PSYCHIATRY & NEUROLOGY

## 2021-12-30 PROCEDURE — 99999 PR PBB SHADOW E&M-EST. PATIENT-LVL II: ICD-10-PCS | Mod: PBBFAC,HCNC,, | Performed by: PSYCHIATRY & NEUROLOGY

## 2021-12-30 PROCEDURE — 3044F HG A1C LEVEL LT 7.0%: CPT | Mod: HCNC,CPTII,S$GLB, | Performed by: PSYCHIATRY & NEUROLOGY

## 2021-12-30 PROCEDURE — 99999 PR PBB SHADOW E&M-EST. PATIENT-LVL II: CPT | Mod: PBBFAC,HCNC,, | Performed by: PSYCHIATRY & NEUROLOGY

## 2021-12-30 PROCEDURE — 3008F BODY MASS INDEX DOCD: CPT | Mod: HCNC,CPTII,S$GLB, | Performed by: PSYCHIATRY & NEUROLOGY

## 2021-12-30 PROCEDURE — 99214 PR OFFICE/OUTPT VISIT, EST, LEVL IV, 30-39 MIN: ICD-10-PCS | Mod: HCNC,S$GLB,, | Performed by: PSYCHIATRY & NEUROLOGY

## 2021-12-30 RX ORDER — TRAZODONE HYDROCHLORIDE 100 MG/1
300 TABLET ORAL NIGHTLY
Qty: 270 TABLET | Refills: 1 | Status: SHIPPED | OUTPATIENT
Start: 2021-12-30 | End: 2022-07-20 | Stop reason: SDUPTHER

## 2021-12-30 RX ORDER — FLUOXETINE HYDROCHLORIDE 20 MG/1
60 CAPSULE ORAL DAILY
Qty: 270 CAPSULE | Refills: 1 | Status: SHIPPED | OUTPATIENT
Start: 2021-12-30 | End: 2022-09-12 | Stop reason: SDUPTHER

## 2022-01-04 ENCOUNTER — OFFICE VISIT (OUTPATIENT)
Dept: INFECTIOUS DISEASES | Facility: CLINIC | Age: 67
End: 2022-01-04
Payer: MEDICARE

## 2022-01-04 VITALS
DIASTOLIC BLOOD PRESSURE: 66 MMHG | WEIGHT: 196.19 LBS | BODY MASS INDEX: 33.49 KG/M2 | HEIGHT: 64 IN | HEART RATE: 60 BPM | SYSTOLIC BLOOD PRESSURE: 101 MMHG

## 2022-01-04 DIAGNOSIS — N39.0 URINARY TRACT INFECTION ASSOCIATED WITH CYSTOSTOMY CATHETER, SUBSEQUENT ENCOUNTER: Primary | ICD-10-CM

## 2022-01-04 DIAGNOSIS — N39.0 RECURRENT UTI: ICD-10-CM

## 2022-01-04 DIAGNOSIS — T83.510D URINARY TRACT INFECTION ASSOCIATED WITH CYSTOSTOMY CATHETER, SUBSEQUENT ENCOUNTER: Primary | ICD-10-CM

## 2022-01-04 PROCEDURE — 99213 OFFICE O/P EST LOW 20 MIN: CPT | Mod: HCNC,GC,S$GLB, | Performed by: STUDENT IN AN ORGANIZED HEALTH CARE EDUCATION/TRAINING PROGRAM

## 2022-01-04 PROCEDURE — 99999 PR PBB SHADOW E&M-EST. PATIENT-LVL IV: ICD-10-PCS | Mod: PBBFAC,HCNC,GC, | Performed by: STUDENT IN AN ORGANIZED HEALTH CARE EDUCATION/TRAINING PROGRAM

## 2022-01-04 PROCEDURE — 99213 PR OFFICE/OUTPT VISIT, EST, LEVL III, 20-29 MIN: ICD-10-PCS | Mod: HCNC,GC,S$GLB, | Performed by: STUDENT IN AN ORGANIZED HEALTH CARE EDUCATION/TRAINING PROGRAM

## 2022-01-04 PROCEDURE — 99999 PR PBB SHADOW E&M-EST. PATIENT-LVL IV: CPT | Mod: PBBFAC,HCNC,GC, | Performed by: STUDENT IN AN ORGANIZED HEALTH CARE EDUCATION/TRAINING PROGRAM

## 2022-01-04 RX ORDER — CEPHALEXIN 500 MG/1
500 CAPSULE ORAL 4 TIMES DAILY
Qty: 28 CAPSULE | Refills: 0 | Status: SHIPPED | OUTPATIENT
Start: 2022-01-04 | End: 2022-01-11

## 2022-01-04 NOTE — PROGRESS NOTES
Subjective:       Patient ID: Tasha Hawley is a 66 y.o. female.    Chief Complaint: Urinary Tract Infection    HPI     Tasha Hawley is a 67 y/o female with a hx of  neurogenic bladder s/p suprapubic cath and recent botox injections 11/16 presents to clinic for UTI     Patient used to follow up with dr. Brennan last saw him 2020- she has previous positive Ucx Citrobacter/ proteus/ MSSA/MRSA-usual UTI symptoms include increase pain/pressure in her lower abdomen, urinary urgency, odor to the urine and sediment in the urine.   Last cystoscopy 11/16-    Per chart review, bladder pressure after her botox procedure on 11/16/21 with urgency and bladder spasms, making her feel like she wants to leak. Ucx (12/1/21) done after cath exchange - NGT     Patient report suprapubic cath gets changed every - 3 months. Last changed on 12/20      Urine culture was collected 12/20 (per patient obtain from new Cath placed) is positive for MSSA.      Today, she feels fatigued, has suprapubic pressure and pain - change in her urine color (cloudy) and smell- also she always has small amount of drainage from suprapubic cath-     Denies any flank pain fever, loss of appetite or night sweats.    Patient report being scratched from her puppy > 1 week ago- superficial wounds - no erythema or purulent- her last Tdap 2017        Review of Systems   Constitutional: Negative for chills, fatigue and fever.   Eyes: Negative for visual disturbance.   Respiratory: Negative for cough and shortness of breath.    Cardiovascular: Negative for chest pain and leg swelling.   Gastrointestinal: Negative for abdominal pain, change in bowel habit, diarrhea and change in bowel habit.   Genitourinary: Positive for bladder incontinence, dysuria, frequency and urgency. Negative for flank pain and genital sores.   Musculoskeletal: Negative for back pain.   Neurological: Negative for numbness and headaches.   Hematological: Negative for adenopathy.          Objective:       Vitals:    01/04/22 1144   BP: 101/66   Pulse: 60     Physical Exam  Constitutional:       Appearance: She is well-developed.   HENT:      Head: Normocephalic.   Cardiovascular:      Rate and Rhythm: Normal rate and regular rhythm.      Heart sounds: Normal heart sounds. No murmur heard.      Pulmonary:      Effort: Pulmonary effort is normal.      Breath sounds: Normal breath sounds.   Abdominal:      General: Bowel sounds are normal. There is no distension.      Palpations: Abdomen is soft.      Tenderness: There is no abdominal tenderness.   Musculoskeletal:         General: Normal range of motion.   Neurological:      Mental Status: She is alert and oriented to person, place, and time.   Psychiatric:         Mood and Affect: Mood and affect normal.         Behavior: Behavior normal.             Assessment:       Problem List Items Addressed This Visit        Renal/    Urinary tract infection associated with cystostomy catheter - Primary      Other Visit Diagnoses     Recurrent UTI              65 y/o with hx of neurogenic blaster s/p suprapubic cath (last changed 12/20) with Ucx MSSA- c/o suprapubic pressure and usual UTI symptoms. Discussed with the patient and her  if patient develops systemic symptoms- she will need blood cultures and further imgx   Plan:           - Start cephalexin 500 mg Q6 for total of 7 days. (cefadroxil not covered by insurance).  - Patient instructed to reach out if she develops fever, chills, flank pain or with no improvement.  - Continue follow up with urology.  - Dog scratch- scratches looks clean- advised to continue to keep it clean and dry. Monitor if gets worse    Discussed with Dr. Mika Gomez MD  Infectious Disease Fellow  PGY-5    Pager [phone]

## 2022-01-06 ENCOUNTER — EXTERNAL HOME HEALTH (OUTPATIENT)
Dept: HOME HEALTH SERVICES | Facility: HOSPITAL | Age: 67
End: 2022-01-06
Payer: MEDICARE

## 2022-01-12 ENCOUNTER — TELEPHONE (OUTPATIENT)
Dept: UROLOGY | Facility: CLINIC | Age: 67
End: 2022-01-12
Payer: MEDICARE

## 2022-01-12 NOTE — TELEPHONE ENCOUNTER
----- Message from Mariangel Silva MA sent at 1/11/2022  3:11 PM CST -----    ----- Message -----  From: Rita Persaud  Sent: 1/11/2022   3:01 PM CST  To: [facility] Staff    JUDIE YO calling regarding Patient Advice (message) for leakage, itchy  and a lot of pressure.  She is very uncomfortable. call back [phone]

## 2022-01-18 ENCOUNTER — TELEPHONE (OUTPATIENT)
Dept: INFECTIOUS DISEASES | Facility: CLINIC | Age: 67
End: 2022-01-18
Payer: MEDICARE

## 2022-01-18 DIAGNOSIS — N39.0 URINARY TRACT INFECTION ASSOCIATED WITH CYSTOSTOMY CATHETER, SUBSEQUENT ENCOUNTER: Primary | ICD-10-CM

## 2022-01-18 DIAGNOSIS — T83.510D URINARY TRACT INFECTION ASSOCIATED WITH CYSTOSTOMY CATHETER, SUBSEQUENT ENCOUNTER: Primary | ICD-10-CM

## 2022-01-18 NOTE — TELEPHONE ENCOUNTER
Called patient and spoke with . He stated she doesn't have fever or chills that they know of but she is not sleeping and is disoriented. He stated she was like this one other time and she went to ED and it was a bladder infection. Inquired if followed up with urology. He stated wasn't able to get an appointment soon with . Please advise

## 2022-01-18 NOTE — TELEPHONE ENCOUNTER
Pt had pending ucx, which resulted negative on 1/13/22. Has HH nurse who assesses for UTI s/s and will notify office of any catheter or UTI issues.

## 2022-01-18 NOTE — TELEPHONE ENCOUNTER
----- Message from Benny Mcgill sent at 1/18/2022  4:28 PM CST -----  Contact: @[phone]  Patient requesting a return call regarding her infection, Please return call to discuss further

## 2022-01-20 ENCOUNTER — TELEPHONE (OUTPATIENT)
Dept: INFECTIOUS DISEASES | Facility: CLINIC | Age: 67
End: 2022-01-20
Payer: MEDICARE

## 2022-01-20 NOTE — TELEPHONE ENCOUNTER
Patient orders for blood culture x2 and urine culture was faxed to [facility] @ [phone]    PH:[phone]

## 2022-01-24 ENCOUNTER — DOCUMENT SCAN (OUTPATIENT)
Dept: HOME HEALTH SERVICES | Facility: HOSPITAL | Age: 67
End: 2022-01-24
Payer: MEDICARE

## 2022-01-24 RX ORDER — CIPROFLOXACIN 750 MG/1
750 TABLET, FILM COATED ORAL EVERY 12 HOURS
Qty: 20 TABLET | Refills: 0 | Status: SHIPPED | OUTPATIENT
Start: 2022-01-24 | End: 2022-02-03

## 2022-01-24 NOTE — PROGRESS NOTES
Called patient - she c/o not feeling well with cloudy urine -     Per  cath wasn't changed as patient refused-  1/21 urine culture with pan sensitive pseudomonas - Bcx x2 NGT.      Patient feels the same when she had previous UTIs- will go ahead and treat with 10 days course of ciprofloxacin.    Discussed with the patient to hold off tizanidine to avoid DDI.    Will arrange follow up next week.        René Gomez MD  Infectious Disease Fellow  PGY-5    Pager [phone]

## 2022-01-27 ENCOUNTER — OFFICE VISIT (OUTPATIENT)
Dept: OPTOMETRY | Facility: CLINIC | Age: 67
End: 2022-01-27
Payer: MEDICARE

## 2022-01-27 DIAGNOSIS — H25.11 NS (NUCLEAR SCLEROSIS), RIGHT: ICD-10-CM

## 2022-01-27 DIAGNOSIS — Z96.1 PSEUDOPHAKIA OF LEFT EYE: Primary | ICD-10-CM

## 2022-01-27 DIAGNOSIS — H52.203 ASTIGMATISM OF BOTH EYES, UNSPECIFIED TYPE: ICD-10-CM

## 2022-01-27 DIAGNOSIS — I77.9 BILATERAL CAROTID ARTERY DISEASE, UNSPECIFIED TYPE: ICD-10-CM

## 2022-01-27 DIAGNOSIS — H04.123 DRY EYE SYNDROME, BILATERAL: ICD-10-CM

## 2022-01-27 PROCEDURE — 92004 COMPRE OPH EXAM NEW PT 1/>: CPT | Mod: HCNC,S$GLB,, | Performed by: OPTOMETRIST

## 2022-01-27 PROCEDURE — 99999 PR PBB SHADOW E&M-EST. PATIENT-LVL III: CPT | Mod: PBBFAC,HCNC,, | Performed by: OPTOMETRIST

## 2022-01-27 PROCEDURE — 1159F PR MEDICATION LIST DOCUMENTED IN MEDICAL RECORD: ICD-10-PCS | Mod: HCNC,CPTII,S$GLB, | Performed by: OPTOMETRIST

## 2022-01-27 PROCEDURE — 92015 DETERMINE REFRACTIVE STATE: CPT | Mod: HCNC,S$GLB,, | Performed by: OPTOMETRIST

## 2022-01-27 PROCEDURE — 92015 PR REFRACTION: ICD-10-PCS | Mod: HCNC,S$GLB,, | Performed by: OPTOMETRIST

## 2022-01-27 PROCEDURE — 99999 PR PBB SHADOW E&M-EST. PATIENT-LVL III: ICD-10-PCS | Mod: PBBFAC,HCNC,, | Performed by: OPTOMETRIST

## 2022-01-27 PROCEDURE — 92004 PR EYE EXAM, NEW PATIENT,COMPREHESV: ICD-10-PCS | Mod: HCNC,S$GLB,, | Performed by: OPTOMETRIST

## 2022-01-27 PROCEDURE — 1159F MED LIST DOCD IN RCRD: CPT | Mod: HCNC,CPTII,S$GLB, | Performed by: OPTOMETRIST

## 2022-01-27 NOTE — PROGRESS NOTES
HPI     DLS: With Dr. Aggarwal 5/17/2018     Pt here for annual eye exam;  Pt states vision has been very blurry lately. Pt states dog ate her   glasses   Meds: No GTTS    Last edited by Eun Pickett on 1/27/2022  2:01 PM. (History)            Assessment /Plan     For exam results, see Encounter Report.    Pseudophakia of left eye  NS (nuclear sclerosis), right    Bilateral carotid artery disease, unspecified type    Dry eye syndrome, bilateral   Use art tears BID    Astigmatism of both eyes, unspecified type   Rx specs    RTC 1 year, sooner PRN

## 2022-02-01 ENCOUNTER — TELEPHONE (OUTPATIENT)
Dept: INFECTIOUS DISEASES | Facility: CLINIC | Age: 67
End: 2022-02-01
Payer: MEDICARE

## 2022-02-01 NOTE — TELEPHONE ENCOUNTER
Called the patient, report feeling unwell for past 2 days, she has been vomiting clear fluid - she also report shoulder and chest pain- Denies fevers.    I advised her to go to the emergency room but she prefers to go to urgent care for evaluation.    OK to discontinue ciprofloxacin as she received 7 days course.        René Gomez MD  Infectious Disease Fellow  PGY-5    Pager [phone]

## 2022-02-03 ENCOUNTER — OFFICE VISIT (OUTPATIENT)
Dept: INFECTIOUS DISEASES | Facility: CLINIC | Age: 67
End: 2022-02-03
Payer: MEDICARE

## 2022-02-03 ENCOUNTER — HOSPITAL ENCOUNTER (INPATIENT)
Facility: HOSPITAL | Age: 67
LOS: 14 days | Discharge: HOME HEALTH | DRG: 698 | End: 2022-02-17
Attending: EMERGENCY MEDICINE | Admitting: HOSPITALIST
Payer: MEDICARE

## 2022-02-03 VITALS
HEIGHT: 64 IN | DIASTOLIC BLOOD PRESSURE: 63 MMHG | BODY MASS INDEX: 33.98 KG/M2 | WEIGHT: 199.06 LBS | HEART RATE: 55 BPM | TEMPERATURE: 98 F | SYSTOLIC BLOOD PRESSURE: 107 MMHG

## 2022-02-03 DIAGNOSIS — N39.0 RECURRENT UTI (URINARY TRACT INFECTION): Primary | ICD-10-CM

## 2022-02-03 DIAGNOSIS — I50.32 CHRONIC DIASTOLIC HEART FAILURE: Chronic | ICD-10-CM

## 2022-02-03 DIAGNOSIS — R94.31 QT PROLONGATION: ICD-10-CM

## 2022-02-03 DIAGNOSIS — N17.0 ACUTE RENAL FAILURE WITH TUBULAR NECROSIS: ICD-10-CM

## 2022-02-03 DIAGNOSIS — N17.9 AKI (ACUTE KIDNEY INJURY): Primary | ICD-10-CM

## 2022-02-03 DIAGNOSIS — R07.9 CHEST PAIN: ICD-10-CM

## 2022-02-03 LAB
ALBUMIN SERPL BCP-MCNC: 3.9 G/DL (ref 3.5–5.2)
ALP SERPL-CCNC: 109 U/L (ref 55–135)
ALT SERPL W/O P-5'-P-CCNC: 11 U/L (ref 10–44)
ANION GAP SERPL CALC-SCNC: 10 MMOL/L (ref 8–16)
AST SERPL-CCNC: 22 U/L (ref 10–40)
BACTERIA #/AREA URNS HPF: ABNORMAL /HPF
BASOPHILS # BLD AUTO: 0.01 K/UL (ref 0–0.2)
BASOPHILS NFR BLD: 0.2 % (ref 0–1.9)
BILIRUB SERPL-MCNC: 0.7 MG/DL (ref 0.1–1)
BILIRUB UR QL STRIP: NEGATIVE
BUN SERPL-MCNC: 22 MG/DL (ref 8–23)
CALCIUM SERPL-MCNC: 8.4 MG/DL (ref 8.7–10.5)
CHLORIDE SERPL-SCNC: 92 MMOL/L (ref 95–110)
CLARITY UR: ABNORMAL
CO2 SERPL-SCNC: 27 MMOL/L (ref 23–29)
COLOR UR: YELLOW
CREAT SERPL-MCNC: 7.2 MG/DL (ref 0.5–1.4)
CTP QC/QA: YES
DIFFERENTIAL METHOD: ABNORMAL
EOSINOPHIL # BLD AUTO: 0.1 K/UL (ref 0–0.5)
EOSINOPHIL NFR BLD: 2.4 % (ref 0–8)
ERYTHROCYTE [DISTWIDTH] IN BLOOD BY AUTOMATED COUNT: 14.2 % (ref 11.5–14.5)
EST. GFR  (AFRICAN AMERICAN): 6 ML/MIN/1.73 M^2
EST. GFR  (NON AFRICAN AMERICAN): 5 ML/MIN/1.73 M^2
GLUCOSE SERPL-MCNC: 94 MG/DL (ref 70–110)
GLUCOSE UR QL STRIP: NEGATIVE
HCT VFR BLD AUTO: 33 % (ref 37–48.5)
HGB BLD-MCNC: 11.3 G/DL (ref 12–16)
HGB UR QL STRIP: ABNORMAL
HYALINE CASTS #/AREA URNS LPF: 0 /LPF
IMM GRANULOCYTES # BLD AUTO: 0.01 K/UL (ref 0–0.04)
IMM GRANULOCYTES NFR BLD AUTO: 0.2 % (ref 0–0.5)
KETONES UR QL STRIP: NEGATIVE
LACTATE SERPL-SCNC: 0.8 MMOL/L (ref 0.5–2.2)
LACTATE SERPL-SCNC: 2 MMOL/L (ref 0.5–2.2)
LEUKOCYTE ESTERASE UR QL STRIP: ABNORMAL
LYMPHOCYTES # BLD AUTO: 0.8 K/UL (ref 1–4.8)
LYMPHOCYTES NFR BLD: 13.9 % (ref 18–48)
MCH RBC QN AUTO: 28.5 PG (ref 27–31)
MCHC RBC AUTO-ENTMCNC: 34.2 G/DL (ref 32–36)
MCV RBC AUTO: 83 FL (ref 82–98)
MICROSCOPIC COMMENT: ABNORMAL
MONOCYTES # BLD AUTO: 0.6 K/UL (ref 0.3–1)
MONOCYTES NFR BLD: 10.8 % (ref 4–15)
NEUTROPHILS # BLD AUTO: 4 K/UL (ref 1.8–7.7)
NEUTROPHILS NFR BLD: 72.5 % (ref 38–73)
NITRITE UR QL STRIP: NEGATIVE
NRBC BLD-RTO: 0 /100 WBC
PH UR STRIP: 7 [PH] (ref 5–8)
PLATELET # BLD AUTO: 173 K/UL (ref 150–450)
PMV BLD AUTO: 10.9 FL (ref 9.2–12.9)
POTASSIUM SERPL-SCNC: 4.3 MMOL/L (ref 3.5–5.1)
PROT SERPL-MCNC: 6.7 G/DL (ref 6–8.4)
PROT UR QL STRIP: ABNORMAL
RBC # BLD AUTO: 3.97 M/UL (ref 4–5.4)
RBC #/AREA URNS HPF: >100 /HPF (ref 0–4)
SARS-COV-2 RDRP RESP QL NAA+PROBE: NEGATIVE
SODIUM SERPL-SCNC: 129 MMOL/L (ref 136–145)
SP GR UR STRIP: 1.01 (ref 1–1.03)
TROPONIN I SERPL DL<=0.01 NG/ML-MCNC: <0.006 NG/ML (ref 0–0.03)
UNIDENT CRYS URNS QL MICRO: 34
URN SPEC COLLECT METH UR: ABNORMAL
UROBILINOGEN UR STRIP-ACNC: NEGATIVE EU/DL
WBC # BLD AUTO: 5.47 K/UL (ref 3.9–12.7)
WBC #/AREA URNS HPF: 46 /HPF (ref 0–5)
WBC CLUMPS URNS QL MICRO: ABNORMAL
YEAST URNS QL MICRO: ABNORMAL

## 2022-02-03 PROCEDURE — 87086 URINE CULTURE/COLONY COUNT: CPT | Mod: HCNC | Performed by: EMERGENCY MEDICINE

## 2022-02-03 PROCEDURE — 25000003 PHARM REV CODE 250: Mod: HCNC | Performed by: NURSE PRACTITIONER

## 2022-02-03 PROCEDURE — 96365 THER/PROPH/DIAG IV INF INIT: CPT | Mod: HCNC

## 2022-02-03 PROCEDURE — 83605 ASSAY OF LACTIC ACID: CPT | Mod: HCNC | Performed by: EMERGENCY MEDICINE

## 2022-02-03 PROCEDURE — 63600175 PHARM REV CODE 636 W HCPCS: Mod: HCNC | Performed by: EMERGENCY MEDICINE

## 2022-02-03 PROCEDURE — 87088 URINE BACTERIA CULTURE: CPT | Mod: HCNC | Performed by: EMERGENCY MEDICINE

## 2022-02-03 PROCEDURE — 99999 PR PBB SHADOW E&M-EST. PATIENT-LVL IV: CPT | Mod: PBBFAC,HCNC,GC, | Performed by: STUDENT IN AN ORGANIZED HEALTH CARE EDUCATION/TRAINING PROGRAM

## 2022-02-03 PROCEDURE — 12000002 HC ACUTE/MED SURGE SEMI-PRIVATE ROOM: Mod: HCNC

## 2022-02-03 PROCEDURE — 25000003 PHARM REV CODE 250: Mod: HCNC | Performed by: EMERGENCY MEDICINE

## 2022-02-03 PROCEDURE — 99213 PR OFFICE/OUTPT VISIT, EST, LEVL III, 20-29 MIN: ICD-10-PCS | Mod: HCNC,GC,S$GLB, | Performed by: STUDENT IN AN ORGANIZED HEALTH CARE EDUCATION/TRAINING PROGRAM

## 2022-02-03 PROCEDURE — 63600175 PHARM REV CODE 636 W HCPCS: Mod: HCNC | Performed by: NURSE PRACTITIONER

## 2022-02-03 PROCEDURE — 87040 BLOOD CULTURE FOR BACTERIA: CPT | Mod: HCNC | Performed by: EMERGENCY MEDICINE

## 2022-02-03 PROCEDURE — 85025 COMPLETE CBC W/AUTO DIFF WBC: CPT | Mod: HCNC | Performed by: EMERGENCY MEDICINE

## 2022-02-03 PROCEDURE — 99999 PR PBB SHADOW E&M-EST. PATIENT-LVL IV: ICD-10-PCS | Mod: PBBFAC,HCNC,GC, | Performed by: STUDENT IN AN ORGANIZED HEALTH CARE EDUCATION/TRAINING PROGRAM

## 2022-02-03 PROCEDURE — 87106 FUNGI IDENTIFICATION YEAST: CPT | Mod: HCNC | Performed by: EMERGENCY MEDICINE

## 2022-02-03 PROCEDURE — U0002 COVID-19 LAB TEST NON-CDC: HCPCS | Mod: HCNC | Performed by: EMERGENCY MEDICINE

## 2022-02-03 PROCEDURE — 81000 URINALYSIS NONAUTO W/SCOPE: CPT | Mod: HCNC | Performed by: EMERGENCY MEDICINE

## 2022-02-03 PROCEDURE — 84484 ASSAY OF TROPONIN QUANT: CPT | Mod: HCNC | Performed by: EMERGENCY MEDICINE

## 2022-02-03 PROCEDURE — 99213 OFFICE O/P EST LOW 20 MIN: CPT | Mod: HCNC,GC,S$GLB, | Performed by: STUDENT IN AN ORGANIZED HEALTH CARE EDUCATION/TRAINING PROGRAM

## 2022-02-03 PROCEDURE — 80053 COMPREHEN METABOLIC PANEL: CPT | Mod: HCNC | Performed by: EMERGENCY MEDICINE

## 2022-02-03 PROCEDURE — 99285 EMERGENCY DEPT VISIT HI MDM: CPT | Mod: 25,HCNC

## 2022-02-03 RX ORDER — SODIUM CHLORIDE 0.9 % (FLUSH) 0.9 %
10 SYRINGE (ML) INJECTION EVERY 12 HOURS PRN
Status: DISCONTINUED | OUTPATIENT
Start: 2022-02-03 | End: 2022-02-17 | Stop reason: HOSPADM

## 2022-02-03 RX ORDER — QUETIAPINE FUMARATE 100 MG/1
200 TABLET, FILM COATED ORAL NIGHTLY
Status: DISCONTINUED | OUTPATIENT
Start: 2022-02-03 | End: 2022-02-09

## 2022-02-03 RX ORDER — CEFEPIME HYDROCHLORIDE 1 G/50ML
1 INJECTION, SOLUTION INTRAVENOUS
Status: DISCONTINUED | OUTPATIENT
Start: 2022-02-03 | End: 2022-02-03 | Stop reason: DRUGHIGH

## 2022-02-03 RX ORDER — TRAZODONE HYDROCHLORIDE 100 MG/1
300 TABLET ORAL NIGHTLY
Status: DISCONTINUED | OUTPATIENT
Start: 2022-02-03 | End: 2022-02-09

## 2022-02-03 RX ORDER — GLUCAGON 1 MG
1 KIT INJECTION
Status: DISCONTINUED | OUTPATIENT
Start: 2022-02-03 | End: 2022-02-17 | Stop reason: HOSPADM

## 2022-02-03 RX ORDER — ACETAMINOPHEN 325 MG/1
650 TABLET ORAL EVERY 4 HOURS PRN
Status: DISCONTINUED | OUTPATIENT
Start: 2022-02-03 | End: 2022-02-17 | Stop reason: HOSPADM

## 2022-02-03 RX ORDER — POLYETHYLENE GLYCOL 3350 17 G/17G
17 POWDER, FOR SOLUTION ORAL DAILY
Status: DISCONTINUED | OUTPATIENT
Start: 2022-02-04 | End: 2022-02-17 | Stop reason: HOSPADM

## 2022-02-03 RX ORDER — ATORVASTATIN CALCIUM 40 MG/1
80 TABLET, FILM COATED ORAL DAILY
Status: DISCONTINUED | OUTPATIENT
Start: 2022-02-04 | End: 2022-02-17 | Stop reason: HOSPADM

## 2022-02-03 RX ORDER — ONDANSETRON 2 MG/ML
4 INJECTION INTRAMUSCULAR; INTRAVENOUS EVERY 8 HOURS PRN
Status: DISCONTINUED | OUTPATIENT
Start: 2022-02-03 | End: 2022-02-17 | Stop reason: HOSPADM

## 2022-02-03 RX ORDER — HYDROCODONE BITARTRATE AND ACETAMINOPHEN 10; 325 MG/1; MG/1
1 TABLET ORAL EVERY 6 HOURS PRN
Status: DISCONTINUED | OUTPATIENT
Start: 2022-02-03 | End: 2022-02-07

## 2022-02-03 RX ORDER — CEFEPIME HYDROCHLORIDE 1 G/50ML
1 INJECTION, SOLUTION INTRAVENOUS
Status: DISCONTINUED | OUTPATIENT
Start: 2022-02-03 | End: 2022-02-10

## 2022-02-03 RX ORDER — ASPIRIN 81 MG/1
81 TABLET ORAL DAILY
Status: DISCONTINUED | OUTPATIENT
Start: 2022-02-04 | End: 2022-02-17 | Stop reason: HOSPADM

## 2022-02-03 RX ORDER — IBUPROFEN 200 MG
24 TABLET ORAL
Status: DISCONTINUED | OUTPATIENT
Start: 2022-02-03 | End: 2022-02-17 | Stop reason: HOSPADM

## 2022-02-03 RX ORDER — IBUPROFEN 200 MG
16 TABLET ORAL
Status: DISCONTINUED | OUTPATIENT
Start: 2022-02-03 | End: 2022-02-17 | Stop reason: HOSPADM

## 2022-02-03 RX ORDER — HYDROCODONE BITARTRATE AND ACETAMINOPHEN 10; 325 MG/1; MG/1
1 TABLET ORAL
Status: COMPLETED | OUTPATIENT
Start: 2022-02-03 | End: 2022-02-03

## 2022-02-03 RX ORDER — PANTOPRAZOLE SODIUM 40 MG/1
40 TABLET, DELAYED RELEASE ORAL DAILY
Status: DISCONTINUED | OUTPATIENT
Start: 2022-02-04 | End: 2022-02-17 | Stop reason: HOSPADM

## 2022-02-03 RX ORDER — TALC
6 POWDER (GRAM) TOPICAL NIGHTLY PRN
Status: DISCONTINUED | OUTPATIENT
Start: 2022-02-03 | End: 2022-02-17 | Stop reason: HOSPADM

## 2022-02-03 RX ORDER — NALOXONE HCL 0.4 MG/ML
0.02 VIAL (ML) INJECTION
Status: DISCONTINUED | OUTPATIENT
Start: 2022-02-03 | End: 2022-02-17 | Stop reason: HOSPADM

## 2022-02-03 RX ORDER — SENNOSIDES 8.6 MG/1
2 TABLET ORAL 2 TIMES DAILY
Status: DISCONTINUED | OUTPATIENT
Start: 2022-02-04 | End: 2022-02-17 | Stop reason: HOSPADM

## 2022-02-03 RX ORDER — HEPARIN SODIUM 5000 [USP'U]/ML
5000 INJECTION, SOLUTION INTRAVENOUS; SUBCUTANEOUS EVERY 8 HOURS
Status: DISCONTINUED | OUTPATIENT
Start: 2022-02-03 | End: 2022-02-13

## 2022-02-03 RX ORDER — FLUOXETINE HYDROCHLORIDE 20 MG/1
60 CAPSULE ORAL DAILY
Status: DISCONTINUED | OUTPATIENT
Start: 2022-02-04 | End: 2022-02-17 | Stop reason: HOSPADM

## 2022-02-03 RX ORDER — SODIUM CHLORIDE, SODIUM LACTATE, POTASSIUM CHLORIDE, CALCIUM CHLORIDE 600; 310; 30; 20 MG/100ML; MG/100ML; MG/100ML; MG/100ML
INJECTION, SOLUTION INTRAVENOUS CONTINUOUS
Status: DISCONTINUED | OUTPATIENT
Start: 2022-02-03 | End: 2022-02-05

## 2022-02-03 RX ORDER — OXYBUTYNIN CHLORIDE 5 MG/1
15 TABLET, EXTENDED RELEASE ORAL DAILY
Status: DISCONTINUED | OUTPATIENT
Start: 2022-02-04 | End: 2022-02-17 | Stop reason: HOSPADM

## 2022-02-03 RX ADMIN — HYDROCODONE BITARTRATE AND ACETAMINOPHEN 1 TABLET: 10; 325 TABLET ORAL at 04:02

## 2022-02-03 RX ADMIN — CEFEPIME 1 G: 1 INJECTION, POWDER, FOR SOLUTION INTRAMUSCULAR; INTRAVENOUS at 10:02

## 2022-02-03 RX ADMIN — HEPARIN SODIUM 5000 UNITS: 5000 INJECTION INTRAVENOUS; SUBCUTANEOUS at 10:02

## 2022-02-03 RX ADMIN — QUETIAPINE FUMARATE 200 MG: 100 TABLET ORAL at 10:02

## 2022-02-03 RX ADMIN — SODIUM CHLORIDE, SODIUM LACTATE, POTASSIUM CHLORIDE, AND CALCIUM CHLORIDE 2571 ML: .6; .31; .03; .02 INJECTION, SOLUTION INTRAVENOUS at 02:02

## 2022-02-03 RX ADMIN — TRAZODONE HYDROCHLORIDE 300 MG: 100 TABLET ORAL at 10:02

## 2022-02-03 RX ADMIN — CEFTRIAXONE 1 G: 1 INJECTION, SOLUTION INTRAVENOUS at 05:02

## 2022-02-03 RX ADMIN — SODIUM CHLORIDE, SODIUM LACTATE, POTASSIUM CHLORIDE, AND CALCIUM CHLORIDE: .6; .31; .03; .02 INJECTION, SOLUTION INTRAVENOUS at 10:02

## 2022-02-03 RX ADMIN — HYDROCODONE BITARTRATE AND ACETAMINOPHEN 1 TABLET: 10; 325 TABLET ORAL at 10:02

## 2022-02-03 RX ADMIN — Medication 6 MG: at 10:02

## 2022-02-03 NOTE — Clinical Note
Diagnosis: CARITO (acute kidney injury) [[id number]]   Admitting Provider:: EDIE BELTRAN [[id number]]   Future Attending Provider: EDIE BELTRAN [[id number]]   Reason for IP Medical Treatment  (Clinical interventions that can only be accomplished in the IP setting? ) :: UTI / Neurogenic Bladder with suprapubic meadows   Estimated Length of Stay:: 2 midnights   I certify that Inpatient services for greater than or equal to 2 midnights are medically necessary:: Yes   Plans for Post-Acute care--if anticipated (pick the single best option):: A. No post acute care anticipated at this time

## 2022-02-03 NOTE — ED PROVIDER NOTES
Encounter Date: 2/3/2022       History     Chief Complaint   Patient presents with    Fatigue     Pt was referred to ed from infectious disease, for cloudy urine in suprapubic cath, feeling fatigued, with decreased po intake due to n/v      HPI   66f with hx neurogenic bladder with suprapubic catheter, CAD, chronic BLE edema on torsemide, hearing loss, pw abd, chest pain, cloudy urine, sent by her ID physician for workup  Denies fevers, chills, diarrhea  +vomiting  Had been prescribed abx for probable UTI, states she finished 2d ago  Decreased appetite    Review of patient's allergies indicates:   Allergen Reactions    (d)-limonene flavor      Other reaction(s): difficult intubation  Other reaction(s): Difficulty breathing    Bactrim [sulfamethoxazole-trimethoprim] Anaphylaxis    Benadryl [diphenhydramine hcl] Shortness Of Breath    Fentanyl Itching, Nausea And Vomiting and Swelling             Imitrex [sumatriptan succinate] Shortness Of Breath    Topamax [topiramate] Shortness Of Breath    Vancomycin Shortness Of Breath     Rash    Butorphanol tartrate     Darvocet a500 [propoxyphene n-acetaminophen]      Other reaction(s): Difficulty breathing    White petrolatum-zinc     Zinc oxide-white petrolatum      Other reaction(s): Difficulty breathing    Latex, natural rubber Itching and Rash    Phenytoin Rash and Other (See Comments)     Trouble breathing     Past Medical History:   Diagnosis Date    Anticoagulant long-term use     Anxiety     Arthritis     Bilateral lower extremity edema     severe chronic    Carotid artery occlusion     Cataract     Coronary artery disease     subtotalled LAD with collateral    Depression     Fever blister     Hypothyroid     Iron deficiency anemia     Lumbar radiculopathy     with chronic pain    Ocular migraine     Sleep apnea     cpap     Past Surgical History:   Procedure Laterality Date    ADENOIDECTOMY      BACK SURGERY      x 12    CARDIAC  CATHETERIZATION  2016    subtotalled LAD with right to left collaterals    CATARACT EXTRACTION W/  INTRAOCULAR LENS IMPLANT Left     Dr Coleman     CYSTOSCOPIC LITHOLAPAXY N/A 6/27/2019    Procedure: CYSTOLITHOLAPAXY;  Surgeon: Shireen Mayo MD;  Location: [facility] OR 2ND FLR;  Service: Urology;  Laterality: N/A;    CYSTOSCOPIC LITHOLAPAXY N/A 9/3/2019    Procedure: CYSTOLITHOLAPAXY;  Surgeon: Shireen Mayo MD;  Location: [facility] OR 2ND FLR;  Service: Urology;  Laterality: N/A;    CYSTOSCOPY N/A 7/13/2021    Procedure: CYSTOSCOPY;  Surgeon: Shireen Mayo MD;  Location: [facility] OR 1ST FLR;  Service: Urology;  Laterality: N/A;    CYSTOSCOPY  11/16/2021    Procedure: CYSTOSCOPY;  Surgeon: Shireen Mayo MD;  Location: [facility] OR 1ST FLR;  Service: Urology;;    ESOPHAGOGASTRODUODENOSCOPY N/A 5/23/2018    Procedure: ESOPHAGOGASTRODUODENOSCOPY (EGD);  Surgeon: Prince Vance MD;  Location: [facility] (4TH FLR);  Service: Endoscopy;  Laterality: N/A;  r/s 'd per Dr. Vance due to family emergency- ER    HYSTERECTOMY  1975    endometriosis    INJECTION OF BOTULINUM TOXIN TYPE A  7/13/2021    Procedure: INJECTION, BOTULINUM TOXIN, 200units;  Surgeon: Shireen Mayo MD;  Location: [facility] OR 1ST FLR;  Service: Urology;;    INJECTION OF BOTULINUM TOXIN TYPE A  11/16/2021    Procedure: INJECTION, BOTULINUM TOXIN, 200units;  Surgeon: Shireen Mayo MD;  Location: [facility] OR 1ST FLR;  Service: Urology;;    pain pump placement      SQ Dilaudid Pump managed by Dr. Hillman, Pain Management    REPLACEMENT OF CATHETER N/A 10/31/2019    Procedure: REPLACEMENT, CATHETER-SUPRAPUBIC;  Surgeon: Shireen Mayo MD;  Location: [facility] OR 1ST FLR;  Service: Urology;  Laterality: N/A;    SPINAL CORD STIMULATOR REMOVAL      before [place]    SPINE SURGERY  5-13-13    CERVICAL FUSION    TONSILLECTOMY       Family History   Problem Relation Age of Onset    Cancer Mother 55        breast    Cancer Father         esophagus,had  laryngectomy    Esophageal cancer Father     Parkinsonism Maternal Grandmother     Tremor Maternal Grandmother     No Known Problems Brother     No Known Problems Brother     Heart disease Maternal Uncle     Colon cancer Maternal Uncle         Less than 60    No Known Problems Sister     No Known Problems Maternal Aunt     Cirrhosis Paternal Aunt         ETOH    Liver disease Paternal Aunt         ETOH    Liver disease Paternal Uncle         ETOH    Cirrhosis Paternal Uncle         ETOH    No Known Problems Maternal Grandfather     No Known Problems Paternal Grandmother     No Known Problems Paternal Grandfather     Melanoma Neg Hx     Amblyopia Neg Hx     Blindness Neg Hx     Cataracts Neg Hx     Diabetes Neg Hx     Glaucoma Neg Hx     Hypertension Neg Hx     Macular degeneration Neg Hx     Retinal detachment Neg Hx     Strabismus Neg Hx     Stroke Neg Hx     Thyroid disease Neg Hx     Celiac disease Neg Hx     Colon polyps Neg Hx     Cystic fibrosis Neg Hx     Crohn's disease Neg Hx     Inflammatory bowel disease Neg Hx     Liver cancer Neg Hx     Rectal cancer Neg Hx     Stomach cancer Neg Hx     Ulcerative colitis Neg Hx     Lymphoma Neg Hx      Social History     Tobacco Use    Smoking status: Never Smoker    Smokeless tobacco: Never Used   Substance Use Topics    Alcohol use: Never    Drug use: No     Review of Systems   Constitutional: Positive for appetite change and fatigue. Negative for chills, diaphoresis and fever.   HENT: Negative for congestion, nosebleeds, sore throat, trouble swallowing and voice change.    Eyes: Negative for photophobia, pain, redness and itching.   Respiratory: Negative for cough, chest tightness and shortness of breath.    Cardiovascular: Positive for chest pain and leg swelling.   Gastrointestinal: Positive for abdominal pain. Negative for constipation, diarrhea, nausea and vomiting.   Genitourinary: Positive for hematuria. Negative for  decreased urine volume, difficulty urinating, dysuria, flank pain and frequency.   Musculoskeletal: Negative for gait problem.   Skin: Negative for color change, rash and wound.   Neurological: Negative for dizziness, facial asymmetry, speech difficulty and headaches.   Psychiatric/Behavioral: Negative for agitation, confusion and suicidal ideas.   All other systems reviewed and are negative.      Physical Exam     Initial Vitals [02/03/22 1253]   BP Pulse Resp Temp SpO2   114/65 (!) 45 18 98.8 °F (37.1 °C) 97 %      MAP       --         Physical Exam    Nursing note and vitals reviewed.  Constitutional:   EXAM  General: Awake, alert and oriented. No acute distress. Chronically ill appearing.      Head: normocephalic and atraumatic     Eyes: Conjunctivae are clear without exudates or hemorrhage. Sclera is non-icteric. EOM are intact. Eyelids are normal in appearance without swelling or lesions.     Ears: The external ear and ear canal are non-tender and without swelling. The canal is clear without discharge. Hearing intact.     Nose: Nares are patent bilaterally.     Neck: The neck is supple. Trachea is midline. Full ROM.     Cardiac:  Bradycardic rate.     Respiratory: No signs of respiratory distress. No audible wheezes.     Abdominal: Non-distended.     Extremities: BLE swelling.      Skin: Appropriate color for ethnicity.     Neurological: The patient is awake, alert and oriented to person, place, and time with normal speech.     Psychiatric: Appropriate mood and affect.     In light of current/ongoing global covid-19 pandemic, all my encounters w pt were with full ppe including but not limited to gown, gloves, n95, eye protection OR from >6 ft away.             ED Course   Procedures  Labs Reviewed   CBC W/ AUTO DIFFERENTIAL - Abnormal; Notable for the following components:       Result Value    RBC 3.97 (*)     Hemoglobin 11.3 (*)     Hematocrit 33.0 (*)     Lymph # 0.8 (*)     Lymph % 13.9 (*)     All other  components within normal limits   COMPREHENSIVE METABOLIC PANEL - Abnormal; Notable for the following components:    Sodium 129 (*)     Chloride 92 (*)     Creatinine 7.2 (*)     Calcium 8.4 (*)     eGFR if  6 (*)     eGFR if non  5 (*)     All other components within normal limits   URINALYSIS, REFLEX TO URINE CULTURE - Abnormal; Notable for the following components:    Appearance, UA Hazy (*)     Protein, UA 2+ (*)     Occult Blood UA 3+ (*)     Leukocytes, UA 3+ (*)     All other components within normal limits    Narrative:     Specimen Source->Urine   URINALYSIS MICROSCOPIC - Abnormal; Notable for the following components:    RBC, UA >100 (*)     WBC, UA 46 (*)     WBC Clumps, UA Few (*)     Bacteria Moderate (*)     Yeast, UA Many (*)     All other components within normal limits    Narrative:     Specimen Source->Urine   CULTURE, BLOOD   CULTURE, BLOOD   CULTURE, URINE   LACTIC ACID, PLASMA   TROPONIN I   LACTIC ACID, PLASMA   SARS-COV-2 RDRP GENE     EKG Readings: (Independently Interpreted)   Initial Reading: No STEMI.   Sinus bradycardia, rate 47, right axis, T-wave flattening lateral leads, anterior leads, normal intervals       Imaging Results          US Bladder (Final result)  Result time 02/03/22 17:45:40    Final result by Josh Hernadez MD (02/03/22 17:45:40)                 Impression:      Limited exam of the urinary bladder containing suprapubic Motley type catheter with balloon.  No abnormality as imaged.      Electronically signed by: Josh Hernadez  Date:    02/03/2022  Time:    17:45             Narrative:    EXAMINATION:  US BLADDER    CLINICAL HISTORY:  CT findings of possible contained perforation;    TECHNIQUE:  Transabdominal bladder ultrasound was performed.    COMPARISON:  None    FINDINGS:  Is a suprapubic catheter with balloon is seen within the urinary bladder.  This obscures the majority of the bladder which is partially filled.  No discrete  additional mass or wall thickening is evident.                                CT Abdomen Pelvis  Without Contrast (Final result)  Result time 02/03/22 16:01:05    Final result by Enmanuel Noble MD (02/03/22 16:01:05)                 Impression:      Suprapubic catheter in place with small amount of bladder wall tissue along its tip.  Some component of contained perforation would be difficult to entirely exclude.  Suggest initial evaluation with bladder ultrasound.    Unremarkable appearance of the kidneys with no evidence of hydroureteronephrosis.    Unchanged large hiatal hernia with mild wall thickening of the proximal aspect of the stomach.    Bibasilar airspace opacities.    Additional findings as above.    This report was flagged in Epic as abnormal.      Electronically signed by: Enmanuel Noble MD  Date:    02/03/2022  Time:    16:01             Narrative:    EXAMINATION:  CT ABDOMEN PELVIS WITHOUT CONTRAST    CLINICAL HISTORY:  suprapubic tube, new kidney failure Cr 7;    TECHNIQUE:  Axial CT scan of the abdomen and pelvis was obtained from the level of the hemidiaphragms to the pubic symphysis without intravenous contrast. Coronal and sagittal reformats were obtained.    COMPARISON:  05/14/2021.    FINDINGS:  There are no pleural effusions.  There is trace bilateral pleural thickening.  There is no evidence of a pneumothorax.  There are bibasilar airspace opacities.    The heart is unremarkable.  There is normal tapering of the abdominal aorta.  There are scattered calcifications along the course of the abdominal aorta and its branch vessels.    There is no evidence of lymphadenopathy in the abdomen or pelvis.    There is unchanged appearance of large hiatal hernia with wall thickening of proximal portion of the stomach.  The duodenum is within normal limits.  The small bowel loops are unremarkable.  The appendix is not definitely identified.  There are no secondary findings of acute appendicitis.  There is  large amount of stool within the large bowel.  There is colonic diverticula without evidence of acute diverticulitis.    The liver is unremarkable.  The gallbladder is distended.  The biliary tree is within normal limits.  The spleen is unremarkable.  There is fatty atrophy of the pancreas.  The adrenal glands are unremarkable.    There is unchanged appearance of mild bilateral perinephric stranding.  The kidneys are otherwise unremarkable.  The ureters are unremarkable.  There is a suprapubic catheter with small amount of blood tissue along its tip.  There is unchanged wall thickening of the urinary bladder.  The patient is status post hysterectomy.    There is no evidence of free fluid in the abdomen or pelvis.  There is no evidence of free air.  There is no evidence of pneumatosis.  No portal venous air is identified.    The psoas margins are unremarkable.  There is unchanged appearance of intrathecal device is overlying the right anterior abdominal wall with the tip not visualized.  There are degenerative changes in the osseous structures.  There is persistent sclerosis of the sacroiliac joints.  There is dextroconvex scoliosis of the thoracolumbar alignment.  There is stable appearance of posterior instrumented lumbar hardware.                                 Medications   sodium chloride 0.9% flush 10 mL (has no administration in time range)   glucose chewable tablet 16 g (has no administration in time range)   glucose chewable tablet 24 g (has no administration in time range)   dextrose 50% injection 12.5 g (has no administration in time range)   dextrose 50% injection 25 g (has no administration in time range)   glucagon (human recombinant) injection 1 mg (has no administration in time range)   naloxone 0.4 mg/mL injection 0.02 mg (has no administration in time range)   heparin (porcine) injection 5,000 Units (5,000 Units Subcutaneous Given 2/3/22 1113)   acetaminophen tablet 650 mg (has no administration in  time range)   ondansetron injection 4 mg (has no administration in time range)   polyethylene glycol packet 17 g (has no administration in time range)   melatonin tablet 6 mg (6 mg Oral Given 2/3/22 2246)   cefepime in dextrose 5 % 1 gram/50 mL IVPB 1 g ( Intravenous Stopped 2/3/22 2323)   lactated ringers infusion ( Intravenous New Bag 2/3/22 2240)   aspirin EC tablet 81 mg (has no administration in time range)   atorvastatin tablet 80 mg (has no administration in time range)   FLUoxetine capsule 60 mg (has no administration in time range)   HYDROcodone-acetaminophen  mg per tablet 1 tablet (1 tablet Oral Given 2/3/22 2251)   levothyroxine tablet 125 mcg (has no administration in time range)   oxybutynin 24 hr tablet 15 mg (has no administration in time range)   pantoprazole EC tablet 40 mg (has no administration in time range)   QUEtiapine tablet 200 mg (200 mg Oral Given 2/3/22 2251)   senna tablet 2 tablet (has no administration in time range)   traZODone tablet 300 mg (300 mg Oral Given 2/3/22 2251)   lactated ringers bolus 2,571 mL (0 mLs Intravenous Stopped 2/3/22 1435)   cefTRIAXone (ROCEPHIN) 1 g/50 mL D5W IVPB (0 g Intravenous Stopped 2/3/22 1740)   HYDROcodone-acetaminophen  mg per tablet 1 tablet (1 tablet Oral Given 2/3/22 1659)     Medical Decision Making:   Clinical Tests:   Lab Tests: Reviewed and Ordered  Radiological Study: Reviewed and Ordered  Medical Tests: Reviewed and Ordered  ED Management:  Patient is chronically ill-appearing.  Vital signs are significant for marked bradycardia.  After reading the infectious disease fellow does note, sepsis workup started.  Patient's lactate is flat, as is her white blood cell count, however patient is in marked renal failure with a new creatinine of 7.2.  Also marked hyponatremia.  Suspect severe dehydration or hydronephrosis.  Will obtain CT abdomen pelvis without contrast to evaluate for any intra-abdominal obstruction causing the new renal  failure.  Patient states that the suprapubic catheter is draining.  Will need admission.  Fluids ordered.    Dr Pabon at bedside to evaluate the patient. CT does not show any obstruction, but possibly the tip in the bladder wall. U/s ordered. Pt signed out to hospitalist.                      Clinical Impression:   Final diagnoses:  [N17.9] CARITO (acute kidney injury) (Primary)          ED Disposition Condition    Admit               Shannon Kaur MD  02/04/22 0000

## 2022-02-03 NOTE — PROGRESS NOTES
Subjective:       Patient ID: Tasha Hawley is a 66 y.o. female.    Chief Complaint: Follow-up    HPI     Tasha Hawley is a 67 y/o female with a hx of  neurogenic bladder s/p suprapubic cath and recent botox injections 11/16 presents to clinic for follow up.       I saw the patient in clinic 1/04 for MSSA UTI was given keflex for 7 days - she did well until 01/18 when she started feeling generally unwell - Bcx and repeat Ucx ordered and done 1/21  urine culture with pan sensitive pseudomonas - 01/24 she was prescribed 10 days of ciprofloxacin 750 Q12 for 10 days - Also advised to hold off tizanidine to avoid DDI. 02/1 Called the patient, report feeling unwell for past 2 days, she has been vomiting clear fluid - she also report shoulder and chest pain- I advised her to go to the emergency room and discontinue ciprofloxacin as she received 7 days course.     Patient did not go to the emergency room and continued to have CP, N/V and constipation- she reports her symptoms has worsened and she is unable to tolerate PO. No fevers.        Review of Systems   Constitutional: Positive for activity change, appetite change and fatigue. Negative for chills and fever.   Eyes: Negative for visual disturbance.   Respiratory: Negative for cough and shortness of breath.    Cardiovascular: Positive for palpitations and leg swelling. Negative for chest pain.   Gastrointestinal: Positive for constipation, nausea, vomiting and reflux. Negative for abdominal pain, change in bowel habit, diarrhea and change in bowel habit.   Genitourinary: Positive for bladder incontinence, dysuria, frequency and urgency. Negative for flank pain and genital sores.   Musculoskeletal: Negative for back pain.   Neurological: Negative for numbness and headaches.   Hematological: Negative for adenopathy.         Objective:       Vitals:    02/03/22 1134   BP: 107/63   Pulse: (!) 55   Temp: 98.1 °F (36.7 °C)     Physical Exam  Constitutional:        Appearance: She is well-developed.   HENT:      Head: Normocephalic.   Cardiovascular:      Rate and Rhythm: Normal rate and regular rhythm.      Heart sounds: Normal heart sounds. No murmur heard.      Pulmonary:      Effort: Pulmonary effort is normal.      Breath sounds: Normal breath sounds.   Abdominal:      General: Bowel sounds are normal. There is no distension.      Palpations: Abdomen is soft.      Tenderness: There is no abdominal tenderness.   Musculoskeletal:         General: Normal range of motion.   Neurological:      Mental Status: She is alert and oriented to person, place, and time.   Psychiatric:         Behavior: Behavior normal.             Assessment:       Problem List Items Addressed This Visit        Renal/    Recurrent UTI (urinary tract infection) - Primary            Another UTI would be less likely to cause her current symptoms, she needs further evaluation of her CP and N/V.    Plan:           - referred to emergency room for further evaluation.    Discussed with Dr. Renetta Gomez MD  Infectious Disease Fellow  PGY-5    Pager [phone]

## 2022-02-03 NOTE — PHARMACY MED REC
"  Admission Medication History     The home medication history was taken by Radha Sevilla CPhT.    Medication history obtained from, Patient Verified    You may go to "Admission" then "Reconcile Home Medications" tabs to review and/or act upon these items.      The home medication list has been updated by the Pharmacy department.    Please read ALL comments highlighted in yellow.    Please address this information as you see fit.     Feel free to contact us if you have any questions or require assistance.      The medications listed below were removed from the home medication list.  Please reorder if appropriate:  Patient reports no longer taking the following medication(s):   Baclofen 10 mg   Zofran ODT 4 mg        Radha Sevilla CPhT.  Ext [phone]              .          "

## 2022-02-04 PROBLEM — E87.1 HYPONATREMIA: Status: ACTIVE | Noted: 2022-02-04

## 2022-02-04 LAB
ANION GAP SERPL CALC-SCNC: 13 MMOL/L (ref 8–16)
ANION GAP SERPL CALC-SCNC: 15 MMOL/L (ref 8–16)
BACTERIA #/AREA URNS HPF: ABNORMAL /HPF
BASOPHILS # BLD AUTO: 0.03 K/UL (ref 0–0.2)
BASOPHILS NFR BLD: 0.6 % (ref 0–1.9)
BILIRUB UR QL STRIP: NEGATIVE
BNP SERPL-MCNC: 118 PG/ML (ref 0–99)
BUN SERPL-MCNC: 22 MG/DL (ref 8–23)
BUN SERPL-MCNC: 23 MG/DL (ref 8–23)
CALCIUM SERPL-MCNC: 7.9 MG/DL (ref 8.7–10.5)
CALCIUM SERPL-MCNC: 8.3 MG/DL (ref 8.7–10.5)
CHLORIDE SERPL-SCNC: 94 MMOL/L (ref 95–110)
CHLORIDE SERPL-SCNC: 95 MMOL/L (ref 95–110)
CLARITY UR: CLEAR
CO2 SERPL-SCNC: 20 MMOL/L (ref 23–29)
CO2 SERPL-SCNC: 23 MMOL/L (ref 23–29)
COLOR UR: YELLOW
CREAT SERPL-MCNC: 7.2 MG/DL (ref 0.5–1.4)
CREAT SERPL-MCNC: 7.2 MG/DL (ref 0.5–1.4)
DIFFERENTIAL METHOD: ABNORMAL
EOSINOPHIL # BLD AUTO: 0.2 K/UL (ref 0–0.5)
EOSINOPHIL NFR BLD: 4.3 % (ref 0–8)
EOSINOPHIL URNS QL WRIGHT STN: NORMAL
ERYTHROCYTE [DISTWIDTH] IN BLOOD BY AUTOMATED COUNT: 14.4 % (ref 11.5–14.5)
EST. GFR  (AFRICAN AMERICAN): 6 ML/MIN/1.73 M^2
EST. GFR  (AFRICAN AMERICAN): 6 ML/MIN/1.73 M^2
EST. GFR  (NON AFRICAN AMERICAN): 5 ML/MIN/1.73 M^2
EST. GFR  (NON AFRICAN AMERICAN): 5 ML/MIN/1.73 M^2
GLUCOSE SERPL-MCNC: 75 MG/DL (ref 70–110)
GLUCOSE SERPL-MCNC: 79 MG/DL (ref 70–110)
GLUCOSE UR QL STRIP: NEGATIVE
HCT VFR BLD AUTO: 32.9 % (ref 37–48.5)
HGB BLD-MCNC: 11 G/DL (ref 12–16)
HGB UR QL STRIP: ABNORMAL
HYALINE CASTS #/AREA URNS LPF: 1 /LPF
IMM GRANULOCYTES # BLD AUTO: 0.01 K/UL (ref 0–0.04)
IMM GRANULOCYTES NFR BLD AUTO: 0.2 % (ref 0–0.5)
KETONES UR QL STRIP: NEGATIVE
LEUKOCYTE ESTERASE UR QL STRIP: ABNORMAL
LYMPHOCYTES # BLD AUTO: 1.4 K/UL (ref 1–4.8)
LYMPHOCYTES NFR BLD: 27.7 % (ref 18–48)
MAGNESIUM SERPL-MCNC: 1.8 MG/DL (ref 1.6–2.6)
MCH RBC QN AUTO: 28.2 PG (ref 27–31)
MCHC RBC AUTO-ENTMCNC: 33.4 G/DL (ref 32–36)
MCV RBC AUTO: 84 FL (ref 82–98)
MICROSCOPIC COMMENT: ABNORMAL
MONOCYTES # BLD AUTO: 0.5 K/UL (ref 0.3–1)
MONOCYTES NFR BLD: 11.1 % (ref 4–15)
NEUTROPHILS # BLD AUTO: 2.7 K/UL (ref 1.8–7.7)
NEUTROPHILS NFR BLD: 56.1 % (ref 38–73)
NITRITE UR QL STRIP: NEGATIVE
NRBC BLD-RTO: 0 /100 WBC
PH UR STRIP: 7 [PH] (ref 5–8)
PHOSPHATE SERPL-MCNC: 5.3 MG/DL (ref 2.7–4.5)
PLATELET # BLD AUTO: 163 K/UL (ref 150–450)
PMV BLD AUTO: 11.4 FL (ref 9.2–12.9)
POTASSIUM SERPL-SCNC: 3.8 MMOL/L (ref 3.5–5.1)
POTASSIUM SERPL-SCNC: 4.3 MMOL/L (ref 3.5–5.1)
PROT UR QL STRIP: NEGATIVE
RBC # BLD AUTO: 3.9 M/UL (ref 4–5.4)
RBC #/AREA URNS HPF: 4 /HPF (ref 0–4)
SODIUM SERPL-SCNC: 129 MMOL/L (ref 136–145)
SODIUM SERPL-SCNC: 131 MMOL/L (ref 136–145)
SODIUM UR-SCNC: 50 MMOL/L (ref 20–250)
SP GR UR STRIP: 1 (ref 1–1.03)
URN SPEC COLLECT METH UR: ABNORMAL
UROBILINOGEN UR STRIP-ACNC: NEGATIVE EU/DL
WBC # BLD AUTO: 4.87 K/UL (ref 3.9–12.7)
WBC #/AREA URNS HPF: 9 /HPF (ref 0–5)

## 2022-02-04 PROCEDURE — 25000003 PHARM REV CODE 250: Mod: HCNC | Performed by: NURSE PRACTITIONER

## 2022-02-04 PROCEDURE — 27000190 HC CPAP FULL FACE MASK W/VALVE: Mod: HCNC

## 2022-02-04 PROCEDURE — 97165 OT EVAL LOW COMPLEX 30 MIN: CPT | Mod: HCNC

## 2022-02-04 PROCEDURE — 63700000 PHARM REV CODE 250 ALT 637 W/O HCPCS: Mod: HCNC | Performed by: INTERNAL MEDICINE

## 2022-02-04 PROCEDURE — 63600175 PHARM REV CODE 636 W HCPCS: Mod: HCNC | Performed by: INTERNAL MEDICINE

## 2022-02-04 PROCEDURE — 80048 BASIC METABOLIC PNL TOTAL CA: CPT | Mod: HCNC | Performed by: NURSE PRACTITIONER

## 2022-02-04 PROCEDURE — 94761 N-INVAS EAR/PLS OXIMETRY MLT: CPT | Mod: HCNC

## 2022-02-04 PROCEDURE — 84300 ASSAY OF URINE SODIUM: CPT | Mod: HCNC | Performed by: INTERNAL MEDICINE

## 2022-02-04 PROCEDURE — 81000 URINALYSIS NONAUTO W/SCOPE: CPT | Mod: HCNC | Performed by: INTERNAL MEDICINE

## 2022-02-04 PROCEDURE — 80048 BASIC METABOLIC PNL TOTAL CA: CPT | Mod: 91,HCNC | Performed by: INTERNAL MEDICINE

## 2022-02-04 PROCEDURE — 97161 PT EVAL LOW COMPLEX 20 MIN: CPT | Mod: HCNC

## 2022-02-04 PROCEDURE — 63600175 PHARM REV CODE 636 W HCPCS: Mod: HCNC | Performed by: NURSE PRACTITIONER

## 2022-02-04 PROCEDURE — 99284 PR EMERGENCY DEPT VISIT,LEVEL IV: ICD-10-PCS | Mod: 25,,, | Performed by: UROLOGY

## 2022-02-04 PROCEDURE — 87205 SMEAR GRAM STAIN: CPT | Mod: HCNC | Performed by: INTERNAL MEDICINE

## 2022-02-04 PROCEDURE — 27000221 HC OXYGEN, UP TO 24 HOURS: Mod: HCNC

## 2022-02-04 PROCEDURE — 51710 PR CHANGE OF BLADDER TUBE,COMPLICATED: ICD-10-PCS | Mod: ,,, | Performed by: UROLOGY

## 2022-02-04 PROCEDURE — 99900035 HC TECH TIME PER 15 MIN (STAT): Mod: HCNC

## 2022-02-04 PROCEDURE — 94660 CPAP INITIATION&MGMT: CPT | Mod: HCNC

## 2022-02-04 PROCEDURE — 85025 COMPLETE CBC W/AUTO DIFF WBC: CPT | Mod: HCNC | Performed by: NURSE PRACTITIONER

## 2022-02-04 PROCEDURE — 97530 THERAPEUTIC ACTIVITIES: CPT | Mod: HCNC

## 2022-02-04 PROCEDURE — 83880 ASSAY OF NATRIURETIC PEPTIDE: CPT | Mod: HCNC | Performed by: INTERNAL MEDICINE

## 2022-02-04 PROCEDURE — 63600175 PHARM REV CODE 636 W HCPCS: Mod: HCNC | Performed by: EMERGENCY MEDICINE

## 2022-02-04 PROCEDURE — 11000001 HC ACUTE MED/SURG PRIVATE ROOM: Mod: HCNC

## 2022-02-04 PROCEDURE — 83735 ASSAY OF MAGNESIUM: CPT | Mod: HCNC | Performed by: NURSE PRACTITIONER

## 2022-02-04 PROCEDURE — 51710 CHANGE OF BLADDER TUBE: CPT | Mod: ,,, | Performed by: UROLOGY

## 2022-02-04 PROCEDURE — 99284 EMERGENCY DEPT VISIT MOD MDM: CPT | Mod: 25,,, | Performed by: UROLOGY

## 2022-02-04 PROCEDURE — 84100 ASSAY OF PHOSPHORUS: CPT | Mod: HCNC | Performed by: NURSE PRACTITIONER

## 2022-02-04 RX ORDER — FLUCONAZOLE 100 MG/1
100 TABLET ORAL DAILY
Status: COMPLETED | OUTPATIENT
Start: 2022-02-04 | End: 2022-02-08

## 2022-02-04 RX ADMIN — HEPARIN SODIUM 5000 UNITS: 5000 INJECTION INTRAVENOUS; SUBCUTANEOUS at 03:02

## 2022-02-04 RX ADMIN — STANDARDIZED SENNA CONCENTRATE 2 TABLET: 8.6 TABLET ORAL at 08:02

## 2022-02-04 RX ADMIN — LEVOTHYROXINE SODIUM 125 MCG: 0.03 TABLET ORAL at 06:02

## 2022-02-04 RX ADMIN — ONDANSETRON 4 MG: 2 INJECTION INTRAMUSCULAR; INTRAVENOUS at 06:02

## 2022-02-04 RX ADMIN — CEFEPIME 1 G: 1 INJECTION, POWDER, FOR SOLUTION INTRAMUSCULAR; INTRAVENOUS at 10:02

## 2022-02-04 RX ADMIN — HYDROCODONE BITARTRATE AND ACETAMINOPHEN 1 TABLET: 10; 325 TABLET ORAL at 03:02

## 2022-02-04 RX ADMIN — ATORVASTATIN CALCIUM 80 MG: 40 TABLET, FILM COATED ORAL at 08:02

## 2022-02-04 RX ADMIN — FLUOXETINE HYDROCHLORIDE 60 MG: 20 CAPSULE ORAL at 08:02

## 2022-02-04 RX ADMIN — TRAZODONE HYDROCHLORIDE 300 MG: 100 TABLET ORAL at 08:02

## 2022-02-04 RX ADMIN — ASPIRIN 81 MG: 81 TABLET, COATED ORAL at 08:02

## 2022-02-04 RX ADMIN — SODIUM CHLORIDE, SODIUM LACTATE, POTASSIUM CHLORIDE, AND CALCIUM CHLORIDE: .6; .31; .03; .02 INJECTION, SOLUTION INTRAVENOUS at 08:02

## 2022-02-04 RX ADMIN — PANTOPRAZOLE SODIUM 40 MG: 40 TABLET, DELAYED RELEASE ORAL at 08:02

## 2022-02-04 RX ADMIN — FLUCONAZOLE 100 MG: 100 TABLET ORAL at 04:02

## 2022-02-04 RX ADMIN — HEPARIN SODIUM 5000 UNITS: 5000 INJECTION INTRAVENOUS; SUBCUTANEOUS at 06:02

## 2022-02-04 RX ADMIN — HEPARIN SODIUM 5000 UNITS: 5000 INJECTION INTRAVENOUS; SUBCUTANEOUS at 10:02

## 2022-02-04 RX ADMIN — QUETIAPINE FUMARATE 200 MG: 100 TABLET ORAL at 08:02

## 2022-02-04 RX ADMIN — OXYBUTYNIN CHLORIDE 15 MG: 5 TABLET, EXTENDED RELEASE ORAL at 08:02

## 2022-02-04 NOTE — CONSULTS
NEPHROLOGY CONSULT NOTE    HPI & INTERVAL HISTORY:    Past Medical History:   Diagnosis Date    Anticoagulant long-term use     Anxiety     Arthritis     Bilateral lower extremity edema     severe chronic    Carotid artery occlusion     Cataract     Coronary artery disease     subtotalled LAD with collateral    Depression     Fever blister     Hypothyroid     Iron deficiency anemia     Lumbar radiculopathy     with chronic pain    Ocular migraine     Sleep apnea     cpap      Past Surgical History:   Procedure Laterality Date    ADENOIDECTOMY      BACK SURGERY      x 12    CARDIAC CATHETERIZATION  2016    subtotalled LAD with right to left collaterals    CATARACT EXTRACTION W/  INTRAOCULAR LENS IMPLANT Left     Dr Coleman     CYSTOSCOPIC LITHOLAPAXY N/A 6/27/2019    Procedure: CYSTOLITHOLAPAXY;  Surgeon: Shireen Mayo MD;  Location: [facility] OR 2ND FLR;  Service: Urology;  Laterality: N/A;    CYSTOSCOPIC LITHOLAPAXY N/A 9/3/2019    Procedure: CYSTOLITHOLAPAXY;  Surgeon: Shireen Mayo MD;  Location: [facility] OR 2ND FLR;  Service: Urology;  Laterality: N/A;    CYSTOSCOPY N/A 7/13/2021    Procedure: CYSTOSCOPY;  Surgeon: Shireen Mayo MD;  Location: [facility] OR 1ST FLR;  Service: Urology;  Laterality: N/A;    CYSTOSCOPY  11/16/2021    Procedure: CYSTOSCOPY;  Surgeon: Shireen Mayo MD;  Location: [facility] OR 1ST FLR;  Service: Urology;;    ESOPHAGOGASTRODUODENOSCOPY N/A 5/23/2018    Procedure: ESOPHAGOGASTRODUODENOSCOPY (EGD);  Surgeon: Prince Vance MD;  Location: [facility] (4TH FLR);  Service: Endoscopy;  Laterality: N/A;  r/s 'd per Dr. Vance due to family emergency- ER    HYSTERECTOMY  1975    endometriosis    INJECTION OF BOTULINUM TOXIN TYPE A  7/13/2021    Procedure: INJECTION, BOTULINUM TOXIN, 200units;  Surgeon: Shireen Mayo MD;  Location: [facility] OR 1ST FLR;  Service: Urology;;    INJECTION OF BOTULINUM TOXIN TYPE A  11/16/2021    Procedure: INJECTION, BOTULINUM TOXIN,  200units;  Surgeon: Shireen Mayo MD;  Location: [facility] OR [address] [place] [address];  Service: Urology;;    pain pump placement      SQ Dilaudid Pump managed by Dr. Hillman, Pain Management    REPLACEMENT OF CATHETER N/A 10/31/2019    Procedure: REPLACEMENT, CATHETER-SUPRAPUBIC;  Surgeon: Shireen Mayo MD;  Location: [facility] OR [address] [place] [address];  Service: Urology;  Laterality: N/A;    SPINAL CORD STIMULATOR REMOVAL      before [facility]    SPINE SURGERY  5-13-13    CERVICAL FUSION    TONSILLECTOMY        Review of patient's allergies indicates:   Allergen Reactions    (d)-limonene flavor      Other reaction(s): difficult intubation  Other reaction(s): Difficulty breathing    Bactrim [sulfamethoxazole-trimethoprim] Anaphylaxis    Benadryl [diphenhydramine hcl] Shortness Of Breath    Fentanyl Itching, Nausea And Vomiting and Swelling             Imitrex [sumatriptan succinate] Shortness Of Breath    Topamax [topiramate] Shortness Of Breath    Vancomycin Shortness Of Breath     Rash    Butorphanol tartrate     Darvocet a500 [propoxyphene n-acetaminophen]      Other reaction(s): Difficulty breathing    White petrolatum-zinc     Zinc oxide-white petrolatum      Other reaction(s): Difficulty breathing    Latex, natural rubber Itching and Rash    Phenytoin Rash and Other (See Comments)     Trouble breathing      (Not in a hospital admission)      Social History     Socioeconomic History    Marital status:    Tobacco Use    Smoking status: Never Smoker    Smokeless tobacco: Never Used   Substance and Sexual Activity    Alcohol use: Never    Drug use: No    Sexual activity: Never     Partners: Male        MEDS   aspirin  81 mg Oral Daily    atorvastatin  80 mg Oral Daily    ceFEPime (MAXIPIME) IVPB  1 g Intravenous Q24H    FLUoxetine  60 mg Oral Daily    heparin (porcine)  5,000 Units Subcutaneous Q8H    levothyroxine  125 mcg Oral Before breakfast    oxybutynin  15 mg Oral Daily    pantoprazole  40 mg Oral  Daily    polyethylene glycol  17 g Oral Daily    QUEtiapine  200 mg Oral Nightly    senna  2 tablet Oral BID    traZODone  300 mg Oral QHS                  CONTINOUS INFUSIONS:      Intake/Output Summary (Last 24 hours) at 2/4/2022 1400  Last data filed at 2/4/2022 1221  Gross per 24 hour   Intake 1132.84 ml   Output 1000 ml   Net 132.84 ml        HEMODYNAMICS:    Temp:  [97.9 °F (36.6 °C)-98.2 °F (36.8 °C)] 98.2 °F (36.8 °C)  Pulse:  [37-51] 48  Resp:  [17-20] 17  SpO2:  [82 %-100 %] 100 %  BP: ()/(51-66) 123/59   General  Nausea  Vomiting   Diarrhea   Decreased intake  SOB  Cough   Weakness  Fatigue  Chronic back pain  Cardiology : pulse 50  Pulmonary : diminished breath sounds  Abdomen soft   Extremities edema   Skin:dry  Supra pubic catheter   LABS   Lab Results   Component Value Date    WBC 4.87 02/04/2022    HGB 11.0 (L) 02/04/2022    HCT 32.9 (L) 02/04/2022    MCV 84 02/04/2022     02/04/2022        Recent Labs   Lab 02/04/22  1231   GLU 75   CALCIUM 8.3*   *   K 4.3   CO2 20*   CL 94*   BUN 23   CREATININE 7.2*      Lab Results   Component Value Date    CALCIUM 8.3 (L) 02/04/2022    PHOS 5.3 (H) 02/04/2022      Lab Results   Component Value Date    IRON 92 06/10/2021    TIBC 330 06/10/2021    FERRITIN 121 06/10/2021        ABG  No results for input(s): PH, PO2, PCO2, HCO3, BE in the last 168 hours.      IMAGING:  CXR    ASSESSMENT / PLAN    Chronic SPT.  Urinary tract infection   UA RBC > 100, WBC 46, yeast, protein.   Culture in progress  Cefepime, diflucan    CARITO/ CKD 2- 3a  UO 1000 cc+      CT w/o hydronephrosis  Suprapubic catheter in place with small amount of bladder wall tissue along its tip  US   Right kidney: The right kidney measures 9.1 cm. No cortical thinning. No loss of corticomedullary distinction.  Decreased perfusion.  Resistive index measures 0.62.  No mass. No renal stone. No hydronephrosis.     Left kidney: The left kidney measures 10.6 cm. No cortical thinning. No  loss of corticomedullary distinction.  Decreased perfusion.  Resistive index measures 0.46.  No mass. No renal stone. No hydronephrosis.     Patient with suprapubic catheter.  Bladder is decompressed and not well visualized.  Hyponatremia  Metabolic acidosis  Metabolic bone disease  Hyperphosphatemia  Hb 11  Albumin 3.9    Echo- 2021  · The left ventricle is normal in size with normal systolic function.  · The estimated ejection fraction is 55%.  · Normal left ventricular diastolic function.  · Mild tricuspid regurgitation.  · Normal right ventricular size with normal right ventricular systolic function.  · The estimated PA systolic pressure is 47 mmHg.  · There is pulmonary hypertension.  Blood pressure 123/59  CXR:  Lung volumes are low.  Cardiomediastinal silhouette is magnified by technique and is likely at the upper limits of normal in size.  Prominent bibasilar subsegmental atelectasis.  No confluent area of consolidation.  No large pleural effusion.  No pneumothorax.  Hiatal hernia again noted.  Postoperative changes in the cervical spine and spinal similar device overlies the lower thoracic spine.  Pulse oximeter 100 % O2    Weight daily  I and O  Renal diet when tolerates  IVF  Avoid nephrotoxic agents, hypotension, hypovolemia

## 2022-02-04 NOTE — ASSESSMENT & PLAN NOTE
Denies SOB, does have chronic BLE  No respiratory distress noted on room air  -chest XRAY with bilateral atelectasis  -hold torsemide 2/2 renal failure

## 2022-02-04 NOTE — ASSESSMENT & PLAN NOTE
Recurrent UTI, completed course of cipro 2 days ago  Followed by ID and urology outpatient  Dark, cloudy urine noted. Patient c/o malaise, chills, N/V, weakness, decreased PO intake  Given a dose of rocephin in ED  -switched to cefepime given recent urine culture sensitivities on 1/21  -urine and blood cx pending  -consult urology for catheter change and eval, performed on 2/4

## 2022-02-04 NOTE — ASSESSMENT & PLAN NOTE
Has dilaudid intrathecal pump, states it doesn't work  -cont home norco dose  -bowel regimen  -monitor closely

## 2022-02-04 NOTE — ASSESSMENT & PLAN NOTE
67 yo F with chronic NGB followed at [facility] with concern for UTI  - CT showed ? Bladder tissue around tip of meadows, low clinical suspicion of perforation  - SPT changed at bedside 2/4, culture pending    CARITO  Cr 7.2 on admit  CT w/o hydronephrosis    Plan  1.  Continue SPT, trend Cr  2.  Follow up RANDEE   3.  Will follow peripherally, call with questions

## 2022-02-04 NOTE — ASSESSMENT & PLAN NOTE
Na 129, baseline 139  In setting of CARITO and poor PO intake  -given 2.5L LR in ED  -cont LR   -monitor labs  -hold torsemide  -nephrology consulted

## 2022-02-04 NOTE — ASSESSMENT & PLAN NOTE
BUN 22. Creatinine 7.2, baseline 1.2  Reports poor PO intake, N/V, decreased urine output  Catheter draining  Given 2.5L IV fluids in ED  -cont IV fluids  -consult nephrology for eval  -hold torsemide  -renally dose meds  -repeat BMP daily

## 2022-02-04 NOTE — ASSESSMENT & PLAN NOTE
Recurrent UTI, completed course of cipro 2 days ago  Followed by ID and urology outpatient  Dark, cloudy urine noted. Patient c/o malaise, chills, N/V, weakness, decreased PO intake  Given a dose of rocephin in ED  -switch to cefepime given recent urine culture sensitivities on 1/21  -urine and blood cx pending  -consult urology for catheter change and eval

## 2022-02-04 NOTE — ASSESSMENT & PLAN NOTE
65 yo F with chronic NGB followed at [facility] with concern for UTI  - CT showed ? Bladder tissue around tip of meadows, low clinical suspicion of perforation  - SPT changed at bedside 2/4, culture pending  - RANDEE 2/4: no hydro, bladder decompressed  -- Candida on UCx: SPT changed on admit    CARITO  Cr 7.2 on admit  CT w/o hydronephrosis, RANDEE w/o hydronephrosis    Plan  1.  RANDEE and CT show no obstruction, good UOP via SPT  2.  Treatment CARITO per nephrology  3.  Candida treatment per primary, SPT changed on admit  4.  Will sign off please call with questions, needs to follow up with her Urologist (at [facility]) after discharge

## 2022-02-04 NOTE — H&P
[facility] Medicine  History & Physical    Patient Name: Tasha Hawley  MRN: [id number]  Patient Class: IP- Inpatient  Admission Date: 2/3/2022  Attending Physician: Kori Hooper MD   Primary Care Provider: Mesfin Hodges Ii, MD         Patient information was obtained from patient, past medical records and ER records.     Subjective:     Principal Problem:Acute renal failure    Chief Complaint:   Chief Complaint   Patient presents with    Fatigue     Pt was referred to ed from infectious disease, for cloudy urine in suprapubic cath, feeling fatigued, with decreased po intake due to n/v         HPI: Tasha Hawley is a 65 yo female with a pmh of neurogenic bladder, suprapubic catheter,recurrent UTI, chronic pain with epidural pain pump, CAD, hypothyroidism, sleep apnea. She c/o malaise, poor PO intake, N/V, chills, decreased urine output, weakness, chronic pain. She was seen by ID today and noted to have cloudy urine. She just finished a course of cipro and has recurrent UTIs. She states she has not been feeling well for a few weeks. She feels as though she is leaking urine from her urethra. She has home health with [facility] and states they changed her catheter 2 days ago (no record on chart) and she is not sure if a urine sample was obtained at that time. She has chronic swelling to her lower legs and walks with a walker. She fell a few days ago with no injury.       Past Medical History:   Diagnosis Date    Anticoagulant long-term use     Anxiety     Arthritis     Bilateral lower extremity edema     severe chronic    Carotid artery occlusion     Cataract     Coronary artery disease     subtotalled LAD with collateral    Depression     Fever blister     Hypothyroid     Iron deficiency anemia     Lumbar radiculopathy     with chronic pain    Ocular migraine     Sleep apnea     cpap       Past Surgical History:   Procedure Laterality Date    ADENOIDECTOMY      BACK SURGERY       x 12    CARDIAC CATHETERIZATION  2016    subtotalled LAD with right to left collaterals    CATARACT EXTRACTION W/  INTRAOCULAR LENS IMPLANT Left     Dr Coleman     CYSTOSCOPIC LITHOLAPAXY N/A 6/27/2019    Procedure: CYSTOLITHOLAPAXY;  Surgeon: Shireen Mayo MD;  Location: [facility] OR 2ND FLR;  Service: Urology;  Laterality: N/A;    CYSTOSCOPIC LITHOLAPAXY N/A 9/3/2019    Procedure: CYSTOLITHOLAPAXY;  Surgeon: Shireen Mayo MD;  Location: [facility] OR 2ND FLR;  Service: Urology;  Laterality: N/A;    CYSTOSCOPY N/A 7/13/2021    Procedure: CYSTOSCOPY;  Surgeon: Shireen Mayo MD;  Location: [facility] OR 1ST FLR;  Service: Urology;  Laterality: N/A;    CYSTOSCOPY  11/16/2021    Procedure: CYSTOSCOPY;  Surgeon: Shireen Mayo MD;  Location: [facility] OR 1ST FLR;  Service: Urology;;    ESOPHAGOGASTRODUODENOSCOPY N/A 5/23/2018    Procedure: ESOPHAGOGASTRODUODENOSCOPY (EGD);  Surgeon: Prince Vance MD;  Location: [facility] (4TH FLR);  Service: Endoscopy;  Laterality: N/A;  r/s 'd per Dr. Vance due to family emergency- ER    HYSTERECTOMY  1975    endometriosis    INJECTION OF BOTULINUM TOXIN TYPE A  7/13/2021    Procedure: INJECTION, BOTULINUM TOXIN, 200units;  Surgeon: Shireen Mayo MD;  Location: [facility] OR 1ST FLR;  Service: Urology;;    INJECTION OF BOTULINUM TOXIN TYPE A  11/16/2021    Procedure: INJECTION, BOTULINUM TOXIN, 200units;  Surgeon: Shireen Mayo MD;  Location: [facility] OR 1ST FLR;  Service: Urology;;    pain pump placement      SQ Dilaudid Pump managed by Dr. Hillman, Pain Management    REPLACEMENT OF CATHETER N/A 10/31/2019    Procedure: REPLACEMENT, CATHETER-SUPRAPUBIC;  Surgeon: Shireen Mayo MD;  Location: [facility] OR 1ST FLR;  Service: Urology;  Laterality: N/A;    SPINAL CORD STIMULATOR REMOVAL      before [facility]    SPINE SURGERY  5-13-13    CERVICAL FUSION    TONSILLECTOMY         Review of patient's allergies indicates:   Allergen Reactions    (d)-limonene flavor      Other  reaction(s): difficult intubation  Other reaction(s): Difficulty breathing    Bactrim [sulfamethoxazole-trimethoprim] Anaphylaxis    Benadryl [diphenhydramine hcl] Shortness Of Breath    Fentanyl Itching, Nausea And Vomiting and Swelling             Imitrex [sumatriptan succinate] Shortness Of Breath    Topamax [topiramate] Shortness Of Breath    Vancomycin Shortness Of Breath     Rash    Butorphanol tartrate     Darvocet a500 [propoxyphene n-acetaminophen]      Other reaction(s): Difficulty breathing    White petrolatum-zinc     Zinc oxide-white petrolatum      Other reaction(s): Difficulty breathing    Latex, natural rubber Itching and Rash    Phenytoin Rash and Other (See Comments)     Trouble breathing       No current facility-administered medications on file prior to encounter.     Current Outpatient Medications on File Prior to Encounter   Medication Sig    aspirin (ECOTRIN) 81 MG EC tablet Take 1 tablet (81 mg total) by mouth once daily.    atorvastatin (LIPITOR) 80 MG tablet TAKE ONE TABLET BY MOUTH EVERY DAY    butalbital-acetaminophen-caffeine -40 mg (FIORICET, ESGIC) -40 mg per tablet Take 1 tablet by mouth daily as needed.    FLUoxetine 20 MG capsule Take 3 capsules (60 mg total) by mouth once daily.    HYDROcodone-acetaminophen (NORCO)  mg per tablet Take 1 tablet by mouth every 4 (four) hours as needed.     levothyroxine (SYNTHROID) 125 MCG tablet Take 1 tablet (125 mcg total) by mouth before breakfast.    lidocaine (LIDODERM) 5 % daily as needed.     nitroGLYCERIN (NITROSTAT) 0.4 MG SL tablet Place 1 tablet (0.4 mg total) under the tongue every 5 (five) minutes as needed for Chest pain.    oxybutynin (DITROPAN XL) 15 MG TR24 Take 1 tablet (15 mg total) by mouth once daily.    pantoprazole (PROTONIX) 40 MG tablet TAKE ONE TABLET BY MOUTH EVERY DAY    potassium chloride (MICRO-K) 10 MEQ CpSR Take 2 capsules by mouth once daily    promethazine (PHENERGAN) 25 MG  tablet Take 25 mg by mouth every 6 (six) hours as needed for Nausea.    QUEtiapine (SEROQUEL) 100 MG Tab Take 2 tablets (200 mg total) by mouth nightly.    QUEtiapine (SEROQUEL) 25 MG Tab Take 12.5-25 mg twice daily as needed for anxiety    senna (SENNA LAX) 8.6 mg tablet Take 2 tablets by mouth 2 (two) times daily.    tiZANidine (ZANAFLEX) 4 MG tablet Take 1 tablet (4 mg total) by mouth 2 (two) times daily as needed for 30 days.    torsemide (DEMADEX) 100 MG Tab Take 1 tablet (100 mg total) by mouth 2 (two) times a day.    traZODone (DESYREL) 100 MG tablet Take 3 tablets (300 mg total) by mouth every evening.    intrathecal pain pump compound Hydromorphone (7.5 mg/mL) infusion at 3.6799 mg/day (0.1533 mg/hr) out of a total reservoir volume of 37.3 mL  Pump filled every 2 months     Family History     Problem Relation (Age of Onset)    Cancer Mother (55), Father    Cirrhosis Paternal Aunt, Paternal Uncle    Colon cancer Maternal Uncle    Esophageal cancer Father    Heart disease Maternal Uncle    Liver disease Paternal Aunt, Paternal Uncle    No Known Problems Brother, Brother, Sister, Maternal Aunt, Maternal Grandfather, Paternal Grandmother, Paternal Grandfather    Parkinsonism Maternal Grandmother    Tremor Maternal Grandmother        Tobacco Use    Smoking status: Never Smoker    Smokeless tobacco: Never Used   Substance and Sexual Activity    Alcohol use: Never    Drug use: No    Sexual activity: Never     Partners: Male     Review of Systems   Constitutional: Positive for appetite change, chills and fatigue. Negative for fever.   HENT: Negative for congestion and trouble swallowing.    Eyes: Negative for visual disturbance.   Respiratory: Negative for cough and shortness of breath.    Cardiovascular: Positive for leg swelling. Negative for chest pain.   Gastrointestinal: Positive for abdominal pain, nausea and vomiting. Negative for diarrhea.   Genitourinary: Positive for decreased urine volume.    Musculoskeletal: Positive for back pain.   Skin: Negative for rash.   Neurological: Positive for weakness. Negative for headaches.   Psychiatric/Behavioral: Negative for agitation.     Objective:     Vital Signs (Most Recent):  Temp: 98.2 °F (36.8 °C) (02/04/22 0003)  Pulse: (!) 51 (02/04/22 0003)  Resp: 17 (02/04/22 0003)  BP: (!) 90/55 (02/04/22 0003)  SpO2: (!) 82 % (02/04/22 0003) Vital Signs (24h Range):  Temp:  [98.1 °F (36.7 °C)-98.8 °F (37.1 °C)] 98.2 °F (36.8 °C)  Pulse:  [42-55] 51  Resp:  [17-20] 17  SpO2:  [82 %-97 %] 82 %  BP: ()/(55-65) 90/55     Weight: 85.7 kg (189 lb)  Body mass index is 32.44 kg/m².    Physical Exam  Vitals and nursing note reviewed.   Constitutional:       General: She is not in acute distress.     Appearance: She is ill-appearing.   HENT:      Head: Normocephalic and atraumatic.      Nose: Nose normal.      Mouth/Throat:      Mouth: Mucous membranes are moist.   Eyes:      Pupils: Pupils are equal, round, and reactive to light.   Cardiovascular:      Rate and Rhythm: Regular rhythm. Bradycardia present.      Pulses: Normal pulses.      Heart sounds: Normal heart sounds.   Pulmonary:      Effort: Pulmonary effort is normal.      Breath sounds: Normal breath sounds.   Abdominal:      General: Bowel sounds are normal.      Palpations: Abdomen is soft.   Musculoskeletal:         General: Tenderness present. Normal range of motion.      Cervical back: Normal range of motion.      Right lower leg: Edema present.      Left lower leg: Edema present.   Skin:     General: Skin is warm and dry.   Neurological:      Mental Status: She is alert and oriented to person, place, and time.      Motor: Weakness present.   Psychiatric:         Behavior: Behavior normal.           CRANIAL NERVES     CN III, IV, VI   Pupils are equal, round, and reactive to light.       Significant Labs: All pertinent labs within the past 24 hours have been reviewed.    Significant Imaging: I have reviewed all  pertinent imaging results/findings within the past 24 hours.    Assessment/Plan:     * Acute renal failure  BUN 22. Creatinine 7.2, baseline 1.2  Reports poor PO intake, N/V, decreased urine output  Catheter draining  Given 2.5L IV fluids in ED  -cont IV fluids  -consult nephrology for eval  -hold torsemide  -renally dose meds  -repeat BMP daily        Hyponatremia  Na 129, baseline 139  In setting of CARITO and poor PO intake  -given 2.5L LR in ED  -cont LR   -monitor labs  -hold torsemide  -nephrology consulted      Bilateral lower extremity edema  Chronic  Hold torsemide for renal failure      Urinary tract infection associated with cystostomy catheter  Recurrent UTI, completed course of cipro 2 days ago  Followed by ID and urology outpatient  Dark, cloudy urine noted. Patient c/o malaise, chills, N/V, weakness, decreased PO intake  Given a dose of rocephin in ED  -switch to cefepime given recent urine culture sensitivities on 1/21  -urine and blood cx pending  -consult urology for catheter change and eval          Chronic diastolic heart failure  Denies SOB, does have chronic BLE  No respiratory distress noted on room air  -obtain chest XRAY  -hold torsemide 2/2 renal failure      Bradycardia  Chronic  EKG not in chart  HR 40s-50s, asymptomatic  Cardiac monitoring      Primary hypothyroidism  Cont synthroid      Frequent falls  Consult PT/OT for eval      Neurogenic bladder  Has chronic indwelling meadows  -cont ditropan  -discontinue zanaflex        Chronic pain syndrome  Has dilaudid intrathecal pump, states it doesn't work  Asking for dilaudid IV injections--refused  -cont home norco dose  -bowel regimen  -monitor closely       Major depressive disorder, recurrent, mild  Cont prozac      Sleep apnea  Non-compliant with CPAP at home  O2 sat drops while sleeping  -CPAP at night        VTE Risk Mitigation (From admission, onward)         Ordered     heparin (porcine) injection 5,000 Units  Every 8 hours          02/03/22 1734     IP VTE HIGH RISK PATIENT  Once         02/03/22 1734     Place sequential compression device  Until discontinued         02/03/22 1734                   Oneyda Vides NP  Department of Hospital Medicine   [place] - Emergency Dept

## 2022-02-04 NOTE — CONSULTS
[facility] - Emergency Dept  Urology  Consult Note    Patient Name: Tasha Hawley  MRN: [id number]  Admission Date: 2/3/2022  Hospital Length of Stay: 1 days  Code Status: Full Code   Attending Provider: Nic Mistry, *   Consulting Provider: Myles Meneses MD  Primary Care Physician: Mesfin Hodges Ii, MD  Principal Problem:Acute renal failure    Consults    Subjective:     HPI:   67 yo F with h/o NGB, chronic SPT, recurrent UTI, chronic pain and other issues admitted with malaise, poor PO intake, N/V.  Notes decreased UOP for days.  In ED noted to have significant CARITO and admitted, Urology consulted for SPT change, UTI and CT findings.    Pt admits to chronic back pain, no pain to SPT or site, feeling better.  Little PO intake for days, N/V.  No hematuria or new incontinence.  Follows at main campus with urology.        Past Medical History:   Diagnosis Date    Anticoagulant long-term use     Anxiety     Arthritis     Bilateral lower extremity edema     severe chronic    Carotid artery occlusion     Cataract     Coronary artery disease     subtotalled LAD with collateral    Depression     Fever blister     Hypothyroid     Iron deficiency anemia     Lumbar radiculopathy     with chronic pain    Ocular migraine     Sleep apnea     cpap       Past Surgical History:   Procedure Laterality Date    ADENOIDECTOMY      BACK SURGERY      x 12    CARDIAC CATHETERIZATION  2016    subtotalled LAD with right to left collaterals    CATARACT EXTRACTION W/  INTRAOCULAR LENS IMPLANT Left     Dr Coleman     CYSTOSCOPIC LITHOLAPAXY N/A 6/27/2019    Procedure: CYSTOLITHOLAPAXY;  Surgeon: Shireen Mayo MD;  Location: [address];  Service: Urology;  Laterality: N/A;    CYSTOSCOPIC LITHOLAPAXY N/A 9/3/2019    Procedure: CYSTOLITHOLAPAXY;  Surgeon: Shireen Mayo MD;  Location: [facility] OR [address] [place] [address];  Service: Urology;  Laterality: N/A;    CYSTOSCOPY N/A 7/13/2021    Procedure: CYSTOSCOPY;   Surgeon: Shireen Mayo MD;  [facility];  Service: Urology;  Laterality: N/A;    CYSTOSCOPY  11/16/2021    Procedure: CYSTOSCOPY;  Surgeon: Shireen Mayo MD;  [facility];  Service: Urology;;    ESOPHAGOGASTRODUODENOSCOPY N/A 5/23/2018    Procedure: ESOPHAGOGASTRODUODENOSCOPY (EGD);  Surgeon: Prince Vance MD;  Location: [facility] (4TH FLR);  Service: Endoscopy;  Laterality: N/A;  r/s 'd per Dr. Vance due to family emergency- ER    HYSTERECTOMY  1975    endometriosis    INJECTION OF BOTULINUM TOXIN TYPE A  7/13/2021    Procedure: INJECTION, BOTULINUM TOXIN, 200units;  Surgeon: Shireen Mayo MD;  [facility];  Service: Urology;;    INJECTION OF BOTULINUM TOXIN TYPE A  11/16/2021    Procedure: INJECTION, BOTULINUM TOXIN, 200units;  Surgeon: Shireen Mayo MD;  [facility];  Service: Urology;;    pain pump placement      SQ Dilaudid Pump managed by Dr. Hillman, Pain Management    REPLACEMENT OF CATHETER N/A 10/31/2019    Procedure: REPLACEMENT, CATHETER-SUPRAPUBIC;  Surgeon: Shireen Mayo MD;  [address];  Service: Urology;  Laterality: N/A;    SPINAL CORD STIMULATOR REMOVAL      before [place]    SPINE SURGERY  5-13-13    CERVICAL FUSION    TONSILLECTOMY         Review of patient's allergies indicates:   Allergen Reactions    (d)-limonene flavor      Other reaction(s): difficult intubation  Other reaction(s): Difficulty breathing    Bactrim [sulfamethoxazole-trimethoprim] Anaphylaxis    Benadryl [diphenhydramine hcl] Shortness Of Breath    Fentanyl Itching, Nausea And Vomiting and Swelling             Imitrex [sumatriptan succinate] Shortness Of Breath    Topamax [topiramate] Shortness Of Breath    Vancomycin Shortness Of Breath     Rash    Butorphanol tartrate     Darvocet a500 [propoxyphene n-acetaminophen]      Other reaction(s): Difficulty breathing    White petrolatum-zinc     Zinc oxide-white  petrolatum      Other reaction(s): Difficulty breathing    Latex, natural rubber Itching and Rash    Phenytoin Rash and Other (See Comments)     Trouble breathing       Family History     Problem Relation (Age of Onset)    Cancer Mother (55), Father    Cirrhosis Paternal Aunt, Paternal Uncle    Colon cancer Maternal Uncle    Esophageal cancer Father    Heart disease Maternal Uncle    Liver disease Paternal Aunt, Paternal Uncle    No Known Problems Brother, Brother, Sister, Maternal Aunt, Maternal Grandfather, Paternal Grandmother, Paternal Grandfather    Parkinsonism Maternal Grandmother    Tremor Maternal Grandmother          Tobacco Use    Smoking status: Never Smoker    Smokeless tobacco: Never Used   Substance and Sexual Activity    Alcohol use: Never    Drug use: No    Sexual activity: Never     Partners: Male       Review of Systems   Constitutional: Positive for activity change and fatigue.   Respiratory: Negative for cough and shortness of breath.    Cardiovascular: Negative for chest pain and palpitations.   Gastrointestinal: Positive for nausea and vomiting. Negative for abdominal distention and abdominal pain.   Genitourinary: Negative for flank pain, hematuria and pelvic pain.   Musculoskeletal: Positive for back pain.   Neurological: Negative for seizures.       Objective:     Temp:  [97.9 °F (36.6 °C)-98.8 °F (37.1 °C)] 97.9 °F (36.6 °C)  Pulse:  [37-55] 40  Resp:  [17-20] 17  SpO2:  [82 %-100 %] 100 %  BP: ()/(51-66) 119/66     Body mass index is 32.44 kg/m².      NAD  RRR  CTAB  ABD S/ND/NTTP   SPT in place with cloudy urine    Existing SPT removed.   Under sterile conditions, 20F  Motley catheter placed into  SPT site with return of  cloudy urine.  Balloon inflated with 25cc sterile water (as was prior).  Placed to gravity drainage.        Significant Labs:  BMP:  Recent Labs   Lab 02/03/22  1328 02/04/22  0437   * 131*   K 4.3 3.8   CL 92* 95   CO2 27 23   BUN 22 22   CREATININE  7.2* 7.2*   CALCIUM 8.4* 7.9*       CBC:  Recent Labs   Lab 02/03/22  1328 02/04/22  0437   WBC 5.47 4.87   HGB 11.3* 11.0*   HCT 33.0* 32.9*    163       Urine Studies:   Recent Labs   Lab 02/03/22  1614   COLORU Yellow   APPEARANCEUA Hazy*   PHUR 7.0   SPECGRAV 1.010   PROTEINUA 2+*   GLUCUA Negative   KETONESU Negative   BILIRUBINUA Negative   OCCULTUA 3+*   NITRITE Negative   UROBILINOGEN Negative   LEUKOCYTESUR 3+*   RBCUA >100*   WBCUA 46*   BACTERIA Moderate*   HYALINECASTS 0       Significant Imaging:  CT: I have reviewed all results within the past 24 hours and my personal findings are:  partiall distended bladder, no perforation, no hydronephrosis    Assessment and Plan:   Urinary tract infection associated with cystostomy catheter  65 yo F with chronic NGB followed at [facility] with concern for UTI  - CT showed ? Bladder tissue around tip of meadows, low clinical suspicion of perforation  - SPT changed at bedside 2/4, culture pending    CARITO  Cr 7.2 on admit  CT w/o hydronephrosis    Plan  1.  Continue SPT, trend Cr  2.  Follow up RANDEE, urien culture  3.  Will follow peripherally, call with questions    Acute renal failure  BUN 22. Creatinine 7.2, baseline 1.2        Thank you for your consult.  Will follow labs over weekend and see Monday, call with questions.     Myles Meneses MD  Urology

## 2022-02-04 NOTE — PROGRESS NOTES
Pharmacist Renal Dose Adjustment Note    Tasha Hawley is a 66 y.o. female being treated with the medication cefepime     Patient Data:    Vital Signs (Most Recent):  Temp: 98.8 °F (37.1 °C) (02/03/22 1253)  Pulse: (!) 45 (02/03/22 1253)  Resp: 18 (02/03/22 1659)  BP: 114/65 (02/03/22 1253)  SpO2: 97 % (02/03/22 1253)   Vital Signs (72h Range):  Temp:  [98.1 °F (36.7 °C)-98.8 °F (37.1 °C)]   Pulse:  [45-55]   Resp:  [18]   BP: (107-114)/(63-65)   SpO2:  [97 %]      Recent Labs   Lab 02/03/22  1328   CREATININE 7.2*     Serum creatinine: 7.2 mg/dL (H) 02/03/22 1328  Estimated creatinine clearance: 8.3 mL/min (A)    Medication: cefepime dose: 1 gm  frequency Q 12 hrs  will be changed to medication:cefepime dose: 1 gm frequency: Q 24 hrs     Pharmacist's Name: Ewelina Mccray  Pharmacist's Extension: [phone]

## 2022-02-04 NOTE — ASSESSMENT & PLAN NOTE
Denies SOB, does have chronic BLE  No respiratory distress noted on room air  -obtain chest XRAY  -hold torsemide 2/2 renal failure

## 2022-02-04 NOTE — ASSESSMENT & PLAN NOTE
Has dilaudid intrathecal pump, states it doesn't work  Asking for dilaudid IV injections--refused  -cont home norco dose  -bowel regimen  -monitor closely

## 2022-02-04 NOTE — PROGRESS NOTES
I have reviewed the notes, assessments, and/or procedures performed by Dr. Gomez, I concur with her/his documentation of Tasha Hawley.

## 2022-02-04 NOTE — SUBJECTIVE & OBJECTIVE
Interval History: reports she feels fatigued and weak. Decreased PO intake. Has had nausea, although this is a little better today. Urology able to exchange suprapubic catheter.    Review of Systems   Constitutional: Positive for appetite change. Negative for fever.   Respiratory: Negative for shortness of breath.    Cardiovascular: Negative for chest pain.   Genitourinary: Positive for difficulty urinating.   Neurological: Positive for weakness.     Objective:     Vital Signs (Most Recent):  Temp: 97.8 °F (36.6 °C) (02/04/22 1445)  Pulse: (!) 45 (02/04/22 1601)  Resp: 14 (02/04/22 1506)  BP: 120/64 (02/04/22 1445)  SpO2: 95 % (02/04/22 1445) Vital Signs (24h Range):  Temp:  [97.8 °F (36.6 °C)-98.2 °F (36.8 °C)] 97.8 °F (36.6 °C)  Pulse:  [37-53] 45  Resp:  [14-20] 14  SpO2:  [82 %-100 %] 95 %  BP: ()/(51-66) 120/64     Weight: 92 kg (202 lb 13.2 oz)  Body mass index is 34.81 kg/m².    Intake/Output Summary (Last 24 hours) at 2/4/2022 1630  Last data filed at 2/4/2022 1520  Gross per 24 hour   Intake 1132.84 ml   Output 1100 ml   Net 32.84 ml      Physical Exam  Vitals and nursing note reviewed.   Constitutional:       General: She is not in acute distress.     Appearance: She is ill-appearing.   HENT:      Head: Normocephalic and atraumatic.      Nose: Nose normal.      Mouth/Throat:      Mouth: Mucous membranes are moist.   Eyes:      Pupils: Pupils are equal, round, and reactive to light.   Cardiovascular:      Rate and Rhythm: Regular rhythm. Bradycardia present.      Pulses: Normal pulses.      Heart sounds: Normal heart sounds.   Pulmonary:      Effort: Pulmonary effort is normal.      Breath sounds: Normal breath sounds.   Abdominal:      General: Bowel sounds are normal.      Palpations: Abdomen is soft.      Comments: Suprapubic catheter   Musculoskeletal:         General: Tenderness present. Normal range of motion.      Cervical back: Normal range of motion.      Right lower leg: Edema present.       Left lower leg: Edema present.   Skin:     General: Skin is warm and dry.   Neurological:      Mental Status: She is alert and oriented to person, place, and time.      Motor: Weakness present.   Psychiatric:         Behavior: Behavior normal.         Significant Labs: All pertinent labs within the past 24 hours have been reviewed.    Significant Imaging: I have reviewed all pertinent imaging results/findings within the past 24 hours.

## 2022-02-04 NOTE — PROGRESS NOTES
[facility]  Progress Note    Patient Name: Tasha Hawley  MRN: [id number]  Patient Class: IP- Inpatient   Admission Date: 2/3/2022  Length of Stay: 1 days  Attending Physician: Nic Mistry, *  Primary Care Provider: Mesfin Hodges Ii, MD        Subjective:     Principal Problem:Acute renal failure        HPI:  Tasha Hawley is a 65 yo female with a pmh of neurogenic bladder, suprapubic catheter,recurrent UTI, chronic pain with epidural pain pump, CAD, hypothyroidism, sleep apnea. She c/o malaise, poor PO intake, N/V, chills, decreased urine output, weakness, chronic pain. She was seen by ID today and noted to have cloudy urine. She just finished a course of cipro and has recurrent UTIs. She states she has not been feeling well for a few weeks. She feels as though she is leaking urine from her urethra. She has home health with [facility] and states they changed her catheter 2 days ago (no record on chart) and she is not sure if a urine sample was obtained at that time. She has chronic swelling to her lower legs and walks with a walker. She fell a few days ago with no injury.       Overview/Hospital Course:  No notes on file    Interval History: reports she feels fatigued and weak. Decreased PO intake. Has had nausea, although this is a little better today. Urology able to exchange suprapubic catheter.    Review of Systems   Constitutional: Positive for appetite change. Negative for fever.   Respiratory: Negative for shortness of breath.    Cardiovascular: Negative for chest pain.   Genitourinary: Positive for difficulty urinating.   Neurological: Positive for weakness.     Objective:     Vital Signs (Most Recent):  Temp: 97.8 °F (36.6 °C) (02/04/22 1445)  Pulse: (!) 45 (02/04/22 1601)  Resp: 14 (02/04/22 1506)  BP: 120/64 (02/04/22 1445)  SpO2: 95 % (02/04/22 1445) Vital Signs (24h Range):  Temp:  [97.8 °F (36.6 °C)-98.2 °F (36.8 °C)] 97.8 °F (36.6 °C)  Pulse:  [37-53] 45  Resp:   [14-20] 14  SpO2:  [82 %-100 %] 95 %  BP: ()/(51-66) 120/64     Weight: 92 kg (202 lb 13.2 oz)  Body mass index is 34.81 kg/m².    Intake/Output Summary (Last 24 hours) at 2/4/2022 1630  Last data filed at 2/4/2022 1520  Gross per 24 hour   Intake 1132.84 ml   Output 1100 ml   Net 32.84 ml      Physical Exam  Vitals and nursing note reviewed.   Constitutional:       General: She is not in acute distress.     Appearance: She is ill-appearing.   HENT:      Head: Normocephalic and atraumatic.      Nose: Nose normal.      Mouth/Throat:      Mouth: Mucous membranes are moist.   Eyes:      Pupils: Pupils are equal, round, and reactive to light.   Cardiovascular:      Rate and Rhythm: Regular rhythm. Bradycardia present.      Pulses: Normal pulses.      Heart sounds: Normal heart sounds.   Pulmonary:      Effort: Pulmonary effort is normal.      Breath sounds: Normal breath sounds.   Abdominal:      General: Bowel sounds are normal.      Palpations: Abdomen is soft.      Comments: Suprapubic catheter   Musculoskeletal:         General: Tenderness present. Normal range of motion.      Cervical back: Normal range of motion.      Right lower leg: Edema present.      Left lower leg: Edema present.   Skin:     General: Skin is warm and dry.   Neurological:      Mental Status: She is alert and oriented to person, place, and time.      Motor: Weakness present.   Psychiatric:         Behavior: Behavior normal.         Significant Labs: All pertinent labs within the past 24 hours have been reviewed.    Significant Imaging: I have reviewed all pertinent imaging results/findings within the past 24 hours.      Assessment/Plan:      * Acute renal failure  BUN 22. Creatinine 7.2, baseline 1.2  Reports poor PO intake, N/V, decreased urine output  Catheter draining  Given 2.5L IV fluids in ED  -cont IV fluids  -consult nephrology for eval  -hold torsemide  -renally dose meds  -repeat BMP daily      Hyponatremia  Na 129, baseline  139  In setting of CARITO and poor PO intake  -given 2.5L LR in ED  -cont LR   -monitor labs  -hold torsemide  -nephrology consulted    Bilateral lower extremity edema  Chronic  Hold torsemide for renal failure      Urinary tract infection associated with cystostomy catheter  Recurrent UTI, completed course of cipro 2 days ago  Followed by ID and urology outpatient  Dark, cloudy urine noted. Patient c/o malaise, chills, N/V, weakness, decreased PO intake  Given a dose of rocephin in ED  -switched to cefepime given recent urine culture sensitivities on 1/21  -urine and blood cx pending  -consult urology for catheter change and eval, performed on 2/4          Chronic diastolic heart failure  Denies SOB, does have chronic BLE  No respiratory distress noted on room air  -chest XRAY with bilateral atelectasis  -hold torsemide 2/2 renal failure      Bradycardia  Chronic  EKG not in chart  HR 40s-50s, asymptomatic  Cardiac monitoring      Primary hypothyroidism  Cont synthroid      Frequent falls  Consult PT/OT for eval      Neurogenic bladder  Has chronic indwelling meadows  -cont ditropan  -discontinue zanaflex        Chronic pain syndrome  Has dilaudid intrathecal pump, states it doesn't work  -cont home norco dose  -bowel regimen  -monitor closely       Major depressive disorder, recurrent, mild  Cont prozac      Sleep apnea  Non-compliant with CPAP at home  O2 sat drops while sleeping  -CPAP at night        VTE Risk Mitigation (From admission, onward)         Ordered     heparin (porcine) injection 5,000 Units  Every 8 hours         02/03/22 1734     IP VTE HIGH RISK PATIENT  Once         02/03/22 1734     Place sequential compression device  Until discontinued         02/03/22 1734                Discharge Planning   JACKIE: 2/6/2022     Code Status: Full Code   Is the patient medically ready for discharge?:     Reason for patient still in hospital (select all that apply): Patient trending condition                      Nic Mistry MD  Department of Hospital Medicine   [facility]

## 2022-02-04 NOTE — SUBJECTIVE & OBJECTIVE
Past Medical History:   Diagnosis Date    Anticoagulant long-term use     Anxiety     Arthritis     Bilateral lower extremity edema     severe chronic    Carotid artery occlusion     Cataract     Coronary artery disease     subtotalled LAD with collateral    Depression     Fever blister     Hypothyroid     Iron deficiency anemia     Lumbar radiculopathy     with chronic pain    Ocular migraine     Sleep apnea     cpap       Past Surgical History:   Procedure Laterality Date    ADENOIDECTOMY      BACK SURGERY      x 12    CARDIAC CATHETERIZATION  2016    subtotalled LAD with right to left collaterals    CATARACT EXTRACTION W/  INTRAOCULAR LENS IMPLANT Left     Dr Coleman     CYSTOSCOPIC LITHOLAPAXY N/A 6/27/2019    Procedure: CYSTOLITHOLAPAXY;  Surgeon: Shireen Mayo MD;  Location: [facility] OR 2ND FLR;  Service: Urology;  Laterality: N/A;    CYSTOSCOPIC LITHOLAPAXY N/A 9/3/2019    Procedure: CYSTOLITHOLAPAXY;  Surgeon: Shireen Mayo MD;  Location: [facility] OR 2ND FLR;  Service: Urology;  Laterality: N/A;    CYSTOSCOPY N/A 7/13/2021    Procedure: CYSTOSCOPY;  Surgeon: Shireen Mayo MD;  Location: [facility] OR 1ST FLR;  Service: Urology;  Laterality: N/A;    CYSTOSCOPY  11/16/2021    Procedure: CYSTOSCOPY;  Surgeon: Shireen Mayo MD;  Location: [facility] OR 1ST FLR;  Service: Urology;;    ESOPHAGOGASTRODUODENOSCOPY N/A 5/23/2018    Procedure: ESOPHAGOGASTRODUODENOSCOPY (EGD);  Surgeon: Prince Vance MD;  Location: [facility] (4TH FLR);  Service: Endoscopy;  Laterality: N/A;  r/s 'd per Dr. Vance due to family emergency- ER    HYSTERECTOMY  1975    endometriosis    INJECTION OF BOTULINUM TOXIN TYPE A  7/13/2021    Procedure: INJECTION, BOTULINUM TOXIN, 200units;  Surgeon: Shireen Mayo MD;  Location: [facility] OR 1ST FLR;  Service: Urology;;    INJECTION OF BOTULINUM TOXIN TYPE A  11/16/2021    Procedure: INJECTION, BOTULINUM TOXIN, 200units;  Surgeon: Shireen Mayo MD;  Location: [facility]  OR 1ST FLR;  Service: Urology;;    pain pump placement      SQ Dilaudid Pump managed by Dr. Hillman, Pain Management    REPLACEMENT OF CATHETER N/A 10/31/2019    Procedure: REPLACEMENT, CATHETER-SUPRAPUBIC;  Surgeon: Shireen Mayo MD;  Location: [facility] OR 1ST FLR;  Service: Urology;  Laterality: N/A;    SPINAL CORD STIMULATOR REMOVAL      before [place]    SPINE SURGERY  5-13-13    CERVICAL FUSION    TONSILLECTOMY         Review of patient's allergies indicates:   Allergen Reactions    (d)-limonene flavor      Other reaction(s): difficult intubation  Other reaction(s): Difficulty breathing    Bactrim [sulfamethoxazole-trimethoprim] Anaphylaxis    Benadryl [diphenhydramine hcl] Shortness Of Breath    Fentanyl Itching, Nausea And Vomiting and Swelling             Imitrex [sumatriptan succinate] Shortness Of Breath    Topamax [topiramate] Shortness Of Breath    Vancomycin Shortness Of Breath     Rash    Butorphanol tartrate     Darvocet a500 [propoxyphene n-acetaminophen]      Other reaction(s): Difficulty breathing    White petrolatum-zinc     Zinc oxide-white petrolatum      Other reaction(s): Difficulty breathing    Latex, natural rubber Itching and Rash    Phenytoin Rash and Other (See Comments)     Trouble breathing       No current facility-administered medications on file prior to encounter.     Current Outpatient Medications on File Prior to Encounter   Medication Sig    aspirin (ECOTRIN) 81 MG EC tablet Take 1 tablet (81 mg total) by mouth once daily.    atorvastatin (LIPITOR) 80 MG tablet TAKE ONE TABLET BY MOUTH EVERY DAY    butalbital-acetaminophen-caffeine -40 mg (FIORICET, ESGIC) -40 mg per tablet Take 1 tablet by mouth daily as needed.    FLUoxetine 20 MG capsule Take 3 capsules (60 mg total) by mouth once daily.    HYDROcodone-acetaminophen (NORCO)  mg per tablet Take 1 tablet by mouth every 4 (four) hours as needed.     levothyroxine (SYNTHROID) 125 MCG  tablet Take 1 tablet (125 mcg total) by mouth before breakfast.    lidocaine (LIDODERM) 5 % daily as needed.     nitroGLYCERIN (NITROSTAT) 0.4 MG SL tablet Place 1 tablet (0.4 mg total) under the tongue every 5 (five) minutes as needed for Chest pain.    oxybutynin (DITROPAN XL) 15 MG TR24 Take 1 tablet (15 mg total) by mouth once daily.    pantoprazole (PROTONIX) 40 MG tablet TAKE ONE TABLET BY MOUTH EVERY DAY    potassium chloride (MICRO-K) 10 MEQ CpSR Take 2 capsules by mouth once daily    promethazine (PHENERGAN) 25 MG tablet Take 25 mg by mouth every 6 (six) hours as needed for Nausea.    QUEtiapine (SEROQUEL) 100 MG Tab Take 2 tablets (200 mg total) by mouth nightly.    QUEtiapine (SEROQUEL) 25 MG Tab Take 12.5-25 mg twice daily as needed for anxiety    senna (SENNA LAX) 8.6 mg tablet Take 2 tablets by mouth 2 (two) times daily.    tiZANidine (ZANAFLEX) 4 MG tablet Take 1 tablet (4 mg total) by mouth 2 (two) times daily as needed for 30 days.    torsemide (DEMADEX) 100 MG Tab Take 1 tablet (100 mg total) by mouth 2 (two) times a day.    traZODone (DESYREL) 100 MG tablet Take 3 tablets (300 mg total) by mouth every evening.    intrathecal pain pump compound Hydromorphone (7.5 mg/mL) infusion at 3.6799 mg/day (0.1533 mg/hr) out of a total reservoir volume of 37.3 mL  Pump filled every 2 months     Family History     Problem Relation (Age of Onset)    Cancer Mother (55), Father    Cirrhosis Paternal Aunt, Paternal Uncle    Colon cancer Maternal Uncle    Esophageal cancer Father    Heart disease Maternal Uncle    Liver disease Paternal Aunt, Paternal Uncle    No Known Problems Brother, Brother, Sister, Maternal Aunt, Maternal Grandfather, Paternal Grandmother, Paternal Grandfather    Parkinsonism Maternal Grandmother    Tremor Maternal Grandmother        Tobacco Use    Smoking status: Never Smoker    Smokeless tobacco: Never Used   Substance and Sexual Activity    Alcohol use: Never    Drug use:  No    Sexual activity: Never     Partners: Male     Review of Systems   Constitutional: Positive for appetite change, chills and fatigue. Negative for fever.   HENT: Negative for congestion and trouble swallowing.    Eyes: Negative for visual disturbance.   Respiratory: Negative for cough and shortness of breath.    Cardiovascular: Positive for leg swelling. Negative for chest pain.   Gastrointestinal: Positive for abdominal pain, nausea and vomiting. Negative for diarrhea.   Genitourinary: Positive for decreased urine volume.   Musculoskeletal: Positive for back pain.   Skin: Negative for rash.   Neurological: Positive for weakness. Negative for headaches.   Psychiatric/Behavioral: Negative for agitation.     Objective:     Vital Signs (Most Recent):  Temp: 98.2 °F (36.8 °C) (02/04/22 0003)  Pulse: (!) 51 (02/04/22 0003)  Resp: 17 (02/04/22 0003)  BP: (!) 90/55 (02/04/22 0003)  SpO2: (!) 82 % (02/04/22 0003) Vital Signs (24h Range):  Temp:  [98.1 °F (36.7 °C)-98.8 °F (37.1 °C)] 98.2 °F (36.8 °C)  Pulse:  [42-55] 51  Resp:  [17-20] 17  SpO2:  [82 %-97 %] 82 %  BP: ()/(55-65) 90/55     Weight: 85.7 kg (189 lb)  Body mass index is 32.44 kg/m².    Physical Exam  Vitals and nursing note reviewed.   Constitutional:       General: She is not in acute distress.     Appearance: She is ill-appearing.   HENT:      Head: Normocephalic and atraumatic.      Nose: Nose normal.      Mouth/Throat:      Mouth: Mucous membranes are moist.   Eyes:      Pupils: Pupils are equal, round, and reactive to light.   Cardiovascular:      Rate and Rhythm: Regular rhythm. Bradycardia present.      Pulses: Normal pulses.      Heart sounds: Normal heart sounds.   Pulmonary:      Effort: Pulmonary effort is normal.      Breath sounds: Normal breath sounds.   Abdominal:      General: Bowel sounds are normal.      Palpations: Abdomen is soft.   Musculoskeletal:         General: Tenderness present. Normal range of motion.      Cervical back:  Normal range of motion.      Right lower leg: Edema present.      Left lower leg: Edema present.   Skin:     General: Skin is warm and dry.   Neurological:      Mental Status: She is alert and oriented to person, place, and time.      Motor: Weakness present.   Psychiatric:         Behavior: Behavior normal.           CRANIAL NERVES     CN III, IV, VI   Pupils are equal, round, and reactive to light.       Significant Labs: All pertinent labs within the past 24 hours have been reviewed.    Significant Imaging: I have reviewed all pertinent imaging results/findings within the past 24 hours.

## 2022-02-04 NOTE — PT/OT/SLP EVAL
Occupational Therapy   Evaluation    Name: Tasha Hawley  MRN: [id number]  Admitting Diagnosis:  Acute renal failure  Recent Surgery: * No surgery found *      Recommendations:     Discharge Recommendations: home health PT,home health OT  Discharge Equipment Recommendations:  bath bench  Barriers to discharge:  None    Assessment:     Tasha Hawley is a 66 y.o. female with a medical diagnosis of Acute renal failure.  She presents with The primary encounter diagnosis was CARITO (acute kidney injury). A diagnosis of Chest pain was also pertinent to this visit.  . Performance deficits affecting function: weakness,impaired endurance,decreased ROM,impaired self care skills,impaired functional mobilty,gait instability,impaired balance,pain,impaired cardiopulmonary response to activity,decreased safety awareness,edema,decreased lower extremity function,decreased upper extremity function,impaired skin,decreased coordination,impaired coordination.      Pt would benefit from cont OT services in order to maximize functional independence. Recommending HHOT/PT at dc and TTB    Rehab Prognosis: Good; patient would benefit from acute skilled OT services to address these deficits and reach maximum level of function.       Plan:     Patient to be seen 3 x/week to address the above listed problems via self-care/home management,therapeutic activities,therapeutic exercises  · Plan of Care Expires: 03/04/22  · Plan of Care Reviewed with: patient,spouse    Subjective     Chief Complaint: fatigue, weakness   Patient/Family Comments/goals: increase strength     Occupational Profile:  Living Environment: with spouse in H, ramp to enter, t/s combo  Previous level of function: pt reports mod I ambulation with rollator; assisted with bathing and LBD; falls   Equipment Used at Home:  walker, rolling,bedside commode,shower chair,wheelchair  Assistance upon Discharge: from spouse     Pain/Comfort:  · Pain Rating 1: 8/10  · Location -  Orientation 1: lower  · Location 1: back  · Pain Addressed 1: Reposition,Distraction  · Pain Rating Post-Intervention 1:  (not reported)    Patients cultural, spiritual, Confucianism conflicts given the current situation:      Objective:     Communicated with: nsg prior to session.   General Precautions: Standard, fall   Orthopedic Precautions:N/A   Braces: N/A      Occupational Performance:    Bed Mobility:    · Patient completed Scooting/Bridging with contact guard assistance  · Patient completed Supine to Sit with contact guard assistance  · Patient completed Sit to Supine with contact guard assistance    Functional Mobility/Transfers:  · Patient completed Sit <> Stand Transfer with contact guard assistance  with  rolling walker   · Functional Mobility: CGA with RW lateral and forward/back steps     Activities of Daily Living:  · N/A     Cognitive/Visual Perceptual:  WFL       Physical Exam:  Balance:    -       fair seated /standign  Postural examination/scapula alignment:    -       Rounded shoulders  -       Forward head  Skin integrity: Visible skin intact  Edema:  Moderate BLEs  Dominant hand:    -       right  Upper Extremity Range of Motion:   BUE WFL   Upper Extremity Strength:  BUE grossly 3+/5     AMPAC 6 Click ADL:  AMPAC Total Score: 15    Treatment & Education:  Pt reporting back pain   Repositioned to EOB  Stood x 1 trial from EOB with RW; forward flexed   Limited ax tolerance   Functional mobility performed alongside EOB   Returned to supine per pt request   Education:    Patient left supine with all lines intact, call button in reach and nsg notified    GOALS:   Multidisciplinary Problems     Occupational Therapy Goals        Problem: Occupational Therapy Goal    Goal Priority Disciplines Outcome Interventions   Occupational Therapy Goal     OT, PT/OT Ongoing, Progressing    Description: Goals to be met by: 3/4/22     Patient will increase functional independence with ADLs by performing:    MARILUZ  Dressing with Minimal Assistance.  Grooming while standing with Supervision.  Toileting from toilet with Supervision for hygiene and clothing management.   Supine to sit with Supervision.  Step transfer with Supervision  Toilet transfer to toilet with Supervision.  Increased functional strength to WFL for self care skills and functional mobility.  Upper extremity exercise program x10 reps per handout, with independence.                     History:     Past Medical History:   Diagnosis Date    Anticoagulant long-term use     Anxiety     Arthritis     Bilateral lower extremity edema     severe chronic    Carotid artery occlusion     Cataract     Coronary artery disease     subtotalled LAD with collateral    Depression     Fever blister     Hypothyroid     Iron deficiency anemia     Lumbar radiculopathy     with chronic pain    Ocular migraine     Sleep apnea     cpap       Past Surgical History:   Procedure Laterality Date    ADENOIDECTOMY      BACK SURGERY      x 12    CARDIAC CATHETERIZATION  2016    subtotalled LAD with right to left collaterals    CATARACT EXTRACTION W/  INTRAOCULAR LENS IMPLANT Left     Dr Coleman     CYSTOSCOPIC LITHOLAPAXY N/A 6/27/2019    Procedure: CYSTOLITHOLAPAXY;  Surgeon: Shireen Mayo MD;  Location: [facility] OR 2ND FLR;  Service: Urology;  Laterality: N/A;    CYSTOSCOPIC LITHOLAPAXY N/A 9/3/2019    Procedure: CYSTOLITHOLAPAXY;  Surgeon: Shireen Mayo MD;  Location: [facility] OR 2ND FLR;  Service: Urology;  Laterality: N/A;    CYSTOSCOPY N/A 7/13/2021    Procedure: CYSTOSCOPY;  Surgeon: Shireen Mayo MD;  Location: [facility] OR 1ST FLR;  Service: Urology;  Laterality: N/A;    CYSTOSCOPY  11/16/2021    Procedure: CYSTOSCOPY;  Surgeon: Shireen Mayo MD;  Location: [facility] OR 1ST FLR;  Service: Urology;;    ESOPHAGOGASTRODUODENOSCOPY N/A 5/23/2018    Procedure: ESOPHAGOGASTRODUODENOSCOPY (EGD);  Surgeon: Prince Vance MD;  Location: [facility] ENDO (4TH FLR);  Service:  Endoscopy;  Laterality: N/A;  r/s 'd per Dr. Vance due to family emergency- ER    HYSTERECTOMY  1975    endometriosis    INJECTION OF BOTULINUM TOXIN TYPE A  7/13/2021    Procedure: INJECTION, BOTULINUM TOXIN, 200units;  Surgeon: Shireen Mayo MD;  Location: [facility] OR [address] [place] [address];  Service: Urology;;    INJECTION OF BOTULINUM TOXIN TYPE A  11/16/2021    Procedure: INJECTION, BOTULINUM TOXIN, 200units;  Surgeon: Shireen Mayo MD;  Location: [facility] OR [address] [place] [address];  Service: Urology;;    pain pump placement      SQ Dilaudid Pump managed by Dr. Hillman, Pain Management    REPLACEMENT OF CATHETER N/A 10/31/2019    Procedure: REPLACEMENT, CATHETER-SUPRAPUBIC;  Surgeon: Shireen Mayo MD;  Location: [facility] OR [address] [place] [address];  Service: Urology;  Laterality: N/A;    SPINAL CORD STIMULATOR REMOVAL      before [place]    SPINE SURGERY  5-13-13    CERVICAL FUSION    TONSILLECTOMY         Time Tracking:     OT Date of Treatment: 02/04/22  OT Start Time: 0911  OT Stop Time: 0931  OT Total Time (min): 20 min    Billable Minutes:Evaluation 10   TA 10    2/4/2022

## 2022-02-04 NOTE — ED NOTES
Placed patient on oxygen at 2lpm via nasal cannula because her POX dropped down into the 80s while sleeping

## 2022-02-04 NOTE — PLAN OF CARE
Problem: Physical Therapy Goal  Goal: Physical Therapy Goal  Description: Goals to be met by: 3/4/22     Patient will increase functional independence with mobility by performin. Supine <> sit with supervision  2. Sit to stand transfer with supervision  3. Bed to chair transfer with Supervision using Rolling Walker  4. Gait  x 50 feet with Supervision using Rolling Walker.   5. Lower extremity exercise program x 10 reps per handout, with supervision    Outcome: Ongoing, Progressing    Initial evaluation completed this date, note to follow. Pt performed bed mobility and transfers w/ CGA. Pt able ambulate ~5 ft forward/backward using RW w/ CGA. Pt functioning close to baseline. Recommending HH OT/PT upon d/c.

## 2022-02-04 NOTE — PROGRESS NOTES
Progress Note  Nephrology      Consult Requested By: Nic Mistry, *      SUBJECTIVE:     Overnight events  Patient is a 66 y.o. female     Patient Active Problem List   Diagnosis    Generalized anxiety disorder    Sleep apnea    Iron deficiency anemia    Major depressive disorder, recurrent, mild    Chronic pain syndrome    Cervical myelopathy    Narcotic dependency, continuous    CHF (congestive heart failure)    Thoracic aorta atherosclerosis    Drug-induced constipation    GERD (gastroesophageal reflux disease)    Coronary artery disease of native artery of native heart with stable angina pectoris    Neurogenic bladder    Urinary retention    Frequent falls    Primary hypothyroidism    Lymphedema of both lower extremities    Scoliosis deformity of spine    S/P insertion of spinal cord stimulator    S/P insertion of intrathecal pump    Paresthesia of both lower extremities    Degenerative disc disease, lumbar    Osteoarthritis of spine with radiculopathy, lumbar region    Facet arthropathy, lumbar    Bradycardia    Bilateral carotid artery disease    Insomnia due to medical condition    Suprapubic catheter    Ischemic cardiomyopathy    Esophageal dysphagia    Recurrent UTI (urinary tract infection)    Dyspnea    Costochondritis    Mixed stress and urge urinary incontinence    Inflammatory spondylopathy of lumbar region    Chronic diastolic heart failure    Partial small bowel obstruction    Constipation due to opioid therapy    Pain in both lower legs    Urinary tract infection associated with cystostomy catheter    Impaired functional mobility and activity tolerance    Weakness    History of stroke with residual deficit    Tremor    CAP (community acquired pneumonia)    Cellulitis    Bilateral lower extremity edema    Acute renal failure    Hyponatremia     Past Medical History:   Diagnosis Date    Anticoagulant long-term use     Anxiety     Arthritis  "    Bilateral lower extremity edema     severe chronic    Carotid artery occlusion     Cataract     Coronary artery disease     subtotalled LAD with collateral    Depression     Fever blister     Hypothyroid     Iron deficiency anemia     Lumbar radiculopathy     with chronic pain    Ocular migraine     Sleep apnea     cpap              OBJECTIVE:     Vitals:    02/04/22 1200 02/04/22 1213 02/04/22 1413 02/04/22 1445   BP:  (!) 123/59  120/64   BP Location:    Right arm   Patient Position:    Sitting   Pulse: (!) 45 (!) 48 (!) 52 (!) 53   Resp:    14   Temp:  98.2 °F (36.8 °C)  97.8 °F (36.6 °C)   TempSrc:    Oral   SpO2: 98% 100%  95%   Weight:    92 kg (202 lb 13.2 oz)   Height:    5' 4" (1.626 m)       Temp: 97.8 °F (36.6 °C) (02/04/22 1445)  Pulse: (!) 53 (02/04/22 1445)  Resp: 14 (02/04/22 1445)  BP: 120/64 (02/04/22 1445)  SpO2: 95 % (02/04/22 1445)    Date 02/04/22 0700 - 02/05/22 0659   Shift 9760-1140 2088-4678 8686-6390 24 Hour Total   INTAKE   I.V.(mL/kg) 1087(11.8)   1087(11.8)   Shift Total(mL/kg) 1087(11.8)   1087(11.8)   OUTPUT   Urine(mL/kg/hr) 1000   1000   Shift Total(mL/kg) 1000(10.9)   1000(10.9)   Weight (kg) 92 92 92 92             Medications:   aspirin  81 mg Oral Daily    atorvastatin  80 mg Oral Daily    ceFEPime (MAXIPIME) IVPB  1 g Intravenous Q24H    fluconazole  100 mg Oral Daily    FLUoxetine  60 mg Oral Daily    heparin (porcine)  5,000 Units Subcutaneous Q8H    levothyroxine  125 mcg Oral Before breakfast    oxybutynin  15 mg Oral Daily    pantoprazole  40 mg Oral Daily    polyethylene glycol  17 g Oral Daily    QUEtiapine  200 mg Oral Nightly    senna  2 tablet Oral BID    traZODone  300 mg Oral QHS      lactated ringers 125 mL/hr at 02/04/22 0939               Physical Exam:  General appearance:  No nausea   No vomiting  No diarrhea  Weakness  Chest discomfort  Cough   Back pain  Lungs: diminished breath sounds  Pulse oximeter 95%  Heart: Pulse " 40-53  Abdomen: soft  Extremities: edema  Skin: dry  Suprapubic catheter    Laboratory:  ABG  Labs reviewed  Recent Results (from the past 336 hour(s))   Basic Metabolic Panel    Collection Time: 02/04/22 12:31 PM   Result Value Ref Range    Sodium 129 (L) 136 - 145 mmol/L    Potassium 4.3 3.5 - 5.1 mmol/L    Chloride 94 (L) 95 - 110 mmol/L    CO2 20 (L) 23 - 29 mmol/L    BUN 23 8 - 23 mg/dL    Creatinine 7.2 (H) 0.5 - 1.4 mg/dL    Calcium 8.3 (L) 8.7 - 10.5 mg/dL    Anion Gap 15 8 - 16 mmol/L   Basic Metabolic Panel (BMP)    Collection Time: 02/04/22  4:37 AM   Result Value Ref Range    Sodium 131 (L) 136 - 145 mmol/L    Potassium 3.8 3.5 - 5.1 mmol/L    Chloride 95 95 - 110 mmol/L    CO2 23 23 - 29 mmol/L    BUN 22 8 - 23 mg/dL    Creatinine 7.2 (H) 0.5 - 1.4 mg/dL    Calcium 7.9 (L) 8.7 - 10.5 mg/dL    Anion Gap 13 8 - 16 mmol/L     Recent Results (from the past 336 hour(s))   CBC with Automated Differential    Collection Time: 02/04/22  4:37 AM   Result Value Ref Range    WBC 4.87 3.90 - 12.70 K/uL    Hemoglobin 11.0 (L) 12.0 - 16.0 g/dL    Hematocrit 32.9 (L) 37.0 - 48.5 %    Platelets 163 150 - 450 K/uL   CBC auto differential    Collection Time: 02/03/22  1:28 PM   Result Value Ref Range    WBC 5.47 3.90 - 12.70 K/uL    Hemoglobin 11.3 (L) 12.0 - 16.0 g/dL    Hematocrit 33.0 (L) 37.0 - 48.5 %    Platelets 173 150 - 450 K/uL     Urinalysis  Recent Labs   Lab 02/04/22  1434   COLORU Yellow   SPECGRAV 1.005   PHUR 7.0   PROTEINUA Negative   BACTERIA Rare   NITRITE Negative   LEUKOCYTESUR Trace*   UROBILINOGEN Negative   HYALINECASTS 1       Diagnostic Results:  X-Ray: Reviewed  US: Reviewed  Echo: Reviewed  ACCESS    ASSESSMENT/PLAN:   Chronic SPT.  Urinary tract infection   UA RBC > 100, WBC 46, yeast, protein.   Culture in progress  Cefepime, diflucan    CARITO/ CKD 2- 3a  UO 1000 cc+      CT w/o hydronephrosis  Suprapubic catheter in place with small amount of bladder wall tissue along its tip  US   Right  kidney: The right kidney measures 9.1 cm. No cortical thinning. No loss of corticomedullary distinction.  Decreased perfusion.  Resistive index measures 0.62.  No mass. No renal stone. No hydronephrosis.     Left kidney: The left kidney measures 10.6 cm. No cortical thinning. No loss of corticomedullary distinction.  Decreased perfusion.  Resistive index measures 0.46.  No mass. No renal stone. No hydronephrosis.     Patient with suprapubic catheter.  Bladder is decompressed and not well visualized.  Hyponatremia  Metabolic acidosis  Metabolic bone disease  Hyperphosphatemia 5.3  Anemia Hb 11  Albumin 3.9    Echo- 2021  · The left ventricle is normal in size with normal systolic function.  · The estimated ejection fraction is 55%.  · Normal left ventricular diastolic function.  · Mild tricuspid regurgitation.  · Normal right ventricular size with normal right ventricular systolic function.  · The estimated PA systolic pressure is 47 mmHg.  · There is pulmonary hypertension.  Blood pressure 120/64  CXR:  Lung volumes are low.  Cardiomediastinal silhouette is magnified by technique and is likely at the upper limits of normal in size.  Prominent bibasilar subsegmental atelectasis.  No confluent area of consolidation.  No large pleural effusion.  No pneumothorax.  Hiatal hernia again noted.  Postoperative changes in the cervical spine and spinal similar device overlies the lower thoracic spine.  Pulse oximeter 100 % O2    Weight daily  I and O  Renal diet when tolerates  IVF  Avoid nephrotoxic agents, hypotension, hypovolemia

## 2022-02-04 NOTE — PT/OT/SLP EVAL
"Physical Therapy Evaluation    Patient Name:  Tasha Hawley   MRN:  [id number]    Recommendations:     Discharge Recommendations:  home with home health,home health PT,home health OT   Discharge Equipment Recommendations:  (transfer tub bench)   Barriers to discharge:  decreased functional mobility    Assessment:     Tasha Hawley is a 66 y.o. female admitted with a medical diagnosis of Acute renal failure.  She presents with the following impairments/functional limitations:  weakness,impaired endurance,impaired self care skills,gait instability,impaired functional mobilty,impaired balance,decreased lower extremity function,decreased upper extremity function,decreased coordination,pain,impaired coordination,impaired cardiopulmonary response to activity,edema. Pt performed bed mobility and transfers w/ CGA. Pt able ambulate ~5 ft forward/backward using RW w/ CGA. Pt functioning close to baseline. Recommending HH OT/PT upon d/c.     Rehab Prognosis: Good; patient would benefit from acute skilled PT services to address these deficits and reach maximum level of function.    Recent Surgery: * No surgery found *      Plan:     During this hospitalization, patient to be seen 5 x/week to address the identified rehab impairments via gait training,therapeutic activities,therapeutic exercises and progress toward the following goals:    Plan of Care Expires:  03/04/22    Subjective     Chief Complaint: LBP and BLE pain  Patient/Family Comments/goals: Pt reports, "The last two weeks have put a toll on me." Spoke w/ pt's  after session and he reported that patient has been declining in the past couple of weeks.   Pain Rating 1: 8/10  Location - Orientation 1: lower  Location 1: back  Pain Addressed 1: Reposition,Distraction  Pain Rating Post-Intervention 1: 9/10 (not reported)  Location - Side 2: Bilateral  Location 2: leg  Pain Addressed 2: Distraction,Reposition  Pain Rating Post-Intervention 2:  (not " reported)    Patients cultural, spiritual, Anabaptism conflicts given the current situation: no    Living Environment:  Pt lives w/  in a SSH, ramp access. Pt has a T/S combo w/ a SC.   Prior to admission, patients level of function was pt reports she was needing increased assistance from her  in the past 2 weeks w/ ADLs and functional mobility, using a rollator for ambulation. Pt reports more frequent falls stating the most recent fall was yesterday. Equipment used at home: walker, rolling,bedside commode,shower chair,wheelchair (rollator).  DME owned (not currently used): none.  Upon discharge, patient will have assistance from .    Objective:     Communicated with nurse Goldman prior to session.  Patient found HOB elevated with telemetry,perineural catheter,bed alarm  upon PT entry to room.    General Precautions: Standard, fall   Orthopedic Precautions:N/A   Braces: N/A  Respiratory Status: Room air    Exams:  Gross Motor Coordination:  Pt demonstrating decreased step length and decreased step height during ambulation  Noted R facial droop and slurred speech. Pt reports this is her baseline.  Postural Exam:  Patient presented with the following abnormalities:    -       Rounded shoulders  -       Forward head  Sensation:    -       Intact  Skin Integrity/Edema:      -       Edema: BLE  RLE ROM: WFL  RLE Strength: 2+/5 hip flex, 2+/5 DF, 3+/5 knee ext  LLE ROM: WFL  LLE Strength: 2+/5 hip flex, 2+/5 DF, 3+/5 knee ext    Functional Mobility:  Bed Mobility:     Supine to Sit: contact guard assistance  Sit to Supine: contact guard assistance  Transfers:     Sit to Stand:  contact guard assistance with rolling walker  Gait: ~5 ft forwards/backwards using RW with CGA. Pt demonstrated slow gait speed, decreased step length, and decreased step height.     Therapeutic Activities and Exercises:  Educated pt on plan of care, pt agreeable to participate in today's therapy session.   Transitioned to  EOB.   Pt performed side-stepping to HOB using RW and CGA.   Pt ambulated as noted above.   Pt w/ decreased activity tolerance this date reporting increased fatigue and weakness.   Returned to supine, HOB elevated, all needs in reach.   Communicated w/ pt's  after session about recommendation of HH PT/OT.     AM-PAC 6 CLICK MOBILITY  Total Score:16     Patient left HOB elevated with all lines intact, call button in reach and pt's  present.    GOALS:   Multidisciplinary Problems       Physical Therapy Goals          Problem: Physical Therapy Goal    Goal Priority Disciplines Outcome Goal Variances Interventions   Physical Therapy Goal     PT, PT/OT Ongoing, Progressing     Description: Goals to be met by: 3/4/22     Patient will increase functional independence with mobility by performin. Supine <> sit with supervision  2. Sit to stand transfer with supervision  3. Bed to chair transfer with Supervision using Rolling Walker  4. Gait  x 50 feet with Supervision using Rolling Walker.   5. Lower extremity exercise program x 10 reps per handout, with supervision                         History:     Past Medical History:   Diagnosis Date    Anticoagulant long-term use     Anxiety     Arthritis     Bilateral lower extremity edema     severe chronic    Carotid artery occlusion     Cataract     Coronary artery disease     subtotalled LAD with collateral    Depression     Fever blister     Hypothyroid     Iron deficiency anemia     Lumbar radiculopathy     with chronic pain    Ocular migraine     Sleep apnea     cpap       Past Surgical History:   Procedure Laterality Date    ADENOIDECTOMY      BACK SURGERY      x 12    CARDIAC CATHETERIZATION  2016    subtotalled LAD with right to left collaterals    CATARACT EXTRACTION W/  INTRAOCULAR LENS IMPLANT Left     Dr Coleman     CYSTOSCOPIC LITHOLAPAXY N/A 2019    Procedure: CYSTOLITHOLAPAXY;  Surgeon: Shireen Mayo MD;  Location: [facility] OR [address] [place] [address];   Service: Urology;  Laterality: N/A;    CYSTOSCOPIC LITHOLAPAXY N/A 9/3/2019    Procedure: CYSTOLITHOLAPAXY;  Surgeon: Shireen Mayo MD;  Location: [facility] OR 2ND FLR;  Service: Urology;  Laterality: N/A;    CYSTOSCOPY N/A 7/13/2021    Procedure: CYSTOSCOPY;  Surgeon: Shireen Mayo MD;  Location: [facility] OR 1ST FLR;  Service: Urology;  Laterality: N/A;    CYSTOSCOPY  11/16/2021    Procedure: CYSTOSCOPY;  Surgeon: Shireen Mayo MD;  Location: [facility] OR 1ST FLR;  Service: Urology;;    ESOPHAGOGASTRODUODENOSCOPY N/A 5/23/2018    Procedure: ESOPHAGOGASTRODUODENOSCOPY (EGD);  Surgeon: Prince Vance MD;  Location: [facility] (4TH FLR);  Service: Endoscopy;  Laterality: N/A;  r/s 'd per Dr. Vance due to family emergency- ER    HYSTERECTOMY  1975    endometriosis    INJECTION OF BOTULINUM TOXIN TYPE A  7/13/2021    Procedure: INJECTION, BOTULINUM TOXIN, 200units;  Surgeon: Shireen Mayo MD;  Location: [facility] OR 1ST FLR;  Service: Urology;;    INJECTION OF BOTULINUM TOXIN TYPE A  11/16/2021    Procedure: INJECTION, BOTULINUM TOXIN, 200units;  Surgeon: Shireen Mayo MD;  Location: [facility] OR 1ST FLR;  Service: Urology;;    pain pump placement      SQ Dilaudid Pump managed by Dr. Hillman, Pain Management    REPLACEMENT OF CATHETER N/A 10/31/2019    Procedure: REPLACEMENT, CATHETER-SUPRAPUBIC;  Surgeon: Shireen Mayo MD;  Location: [facility] OR 1ST FLR;  Service: Urology;  Laterality: N/A;    SPINAL CORD STIMULATOR REMOVAL      before [facility]    SPINE SURGERY  5-13-13    CERVICAL FUSION    TONSILLECTOMY         Time Tracking:     PT Received On: 02/04/22  PT Start Time: 0909     PT Stop Time: 0932  PT Total Time (min): 23 min cotx OT    Billable Minutes: Evaluation 10      02/04/2022

## 2022-02-04 NOTE — PLAN OF CARE
Problem: Occupational Therapy Goal  Goal: Occupational Therapy Goal  Description: Goals to be met by: 3/4/22     Patient will increase functional independence with ADLs by performing:    LE Dressing with Minimal Assistance.  Grooming while standing with Supervision.  Toileting from toilet with Supervision for hygiene and clothing management.   Supine to sit with Supervision.  Step transfer with Supervision  Toilet transfer to toilet with Supervision.  Increased functional strength to WFL for self care skills and functional mobility.  Upper extremity exercise program x10 reps per handout, with independence.    Outcome: Ongoing, Progressing     Pt would benefit from cont OT services in order to maximize functional independence. Recommending HHOT/PT at dc and TTB

## 2022-02-05 PROBLEM — F41.9 ANXIETY: Status: ACTIVE | Noted: 2022-02-05

## 2022-02-05 LAB
ANION GAP SERPL CALC-SCNC: 14 MMOL/L (ref 8–16)
BACTERIA UR CULT: ABNORMAL
BASOPHILS # BLD AUTO: 0.03 K/UL (ref 0–0.2)
BASOPHILS # BLD AUTO: 0.03 K/UL (ref 0–0.2)
BASOPHILS NFR BLD: 0.5 % (ref 0–1.9)
BASOPHILS NFR BLD: 0.6 % (ref 0–1.9)
BUN SERPL-MCNC: 24 MG/DL (ref 8–23)
CALCIUM SERPL-MCNC: 8 MG/DL (ref 8.7–10.5)
CHLORIDE SERPL-SCNC: 97 MMOL/L (ref 95–110)
CO2 SERPL-SCNC: 22 MMOL/L (ref 23–29)
CREAT SERPL-MCNC: 7.4 MG/DL (ref 0.5–1.4)
DIFFERENTIAL METHOD: ABNORMAL
DIFFERENTIAL METHOD: ABNORMAL
EOSINOPHIL # BLD AUTO: 0.2 K/UL (ref 0–0.5)
EOSINOPHIL # BLD AUTO: 0.2 K/UL (ref 0–0.5)
EOSINOPHIL NFR BLD: 3.7 % (ref 0–8)
EOSINOPHIL NFR BLD: 4.2 % (ref 0–8)
ERYTHROCYTE [DISTWIDTH] IN BLOOD BY AUTOMATED COUNT: 14.4 % (ref 11.5–14.5)
ERYTHROCYTE [DISTWIDTH] IN BLOOD BY AUTOMATED COUNT: 14.6 % (ref 11.5–14.5)
EST. GFR  (AFRICAN AMERICAN): 6 ML/MIN/1.73 M^2
EST. GFR  (NON AFRICAN AMERICAN): 5 ML/MIN/1.73 M^2
GLUCOSE SERPL-MCNC: 77 MG/DL (ref 70–110)
HCT VFR BLD AUTO: 33.7 % (ref 37–48.5)
HCT VFR BLD AUTO: 34.9 % (ref 37–48.5)
HGB BLD-MCNC: 11.2 G/DL (ref 12–16)
HGB BLD-MCNC: 11.3 G/DL (ref 12–16)
IMM GRANULOCYTES # BLD AUTO: 0.02 K/UL (ref 0–0.04)
IMM GRANULOCYTES # BLD AUTO: 0.02 K/UL (ref 0–0.04)
IMM GRANULOCYTES NFR BLD AUTO: 0.4 % (ref 0–0.5)
IMM GRANULOCYTES NFR BLD AUTO: 0.4 % (ref 0–0.5)
LYMPHOCYTES # BLD AUTO: 1 K/UL (ref 1–4.8)
LYMPHOCYTES # BLD AUTO: 1.1 K/UL (ref 1–4.8)
LYMPHOCYTES NFR BLD: 18.7 % (ref 18–48)
LYMPHOCYTES NFR BLD: 19 % (ref 18–48)
MAGNESIUM SERPL-MCNC: 1.9 MG/DL (ref 1.6–2.6)
MCH RBC QN AUTO: 28 PG (ref 27–31)
MCH RBC QN AUTO: 28.5 PG (ref 27–31)
MCHC RBC AUTO-ENTMCNC: 32.4 G/DL (ref 32–36)
MCHC RBC AUTO-ENTMCNC: 33.2 G/DL (ref 32–36)
MCV RBC AUTO: 86 FL (ref 82–98)
MCV RBC AUTO: 86 FL (ref 82–98)
MONOCYTES # BLD AUTO: 0.6 K/UL (ref 0.3–1)
MONOCYTES # BLD AUTO: 0.7 K/UL (ref 0.3–1)
MONOCYTES NFR BLD: 10.9 % (ref 4–15)
MONOCYTES NFR BLD: 12 % (ref 4–15)
NEUTROPHILS # BLD AUTO: 3.5 K/UL (ref 1.8–7.7)
NEUTROPHILS # BLD AUTO: 3.7 K/UL (ref 1.8–7.7)
NEUTROPHILS NFR BLD: 63.8 % (ref 38–73)
NEUTROPHILS NFR BLD: 65.8 % (ref 38–73)
NRBC BLD-RTO: 0 /100 WBC
NRBC BLD-RTO: 0 /100 WBC
PHOSPHATE SERPL-MCNC: 5.8 MG/DL (ref 2.7–4.5)
PLATELET # BLD AUTO: 175 K/UL (ref 150–450)
PLATELET # BLD AUTO: 192 K/UL (ref 150–450)
PMV BLD AUTO: 10.9 FL (ref 9.2–12.9)
PMV BLD AUTO: 11.7 FL (ref 9.2–12.9)
POTASSIUM SERPL-SCNC: 4.2 MMOL/L (ref 3.5–5.1)
RBC # BLD AUTO: 3.93 M/UL (ref 4–5.4)
RBC # BLD AUTO: 4.04 M/UL (ref 4–5.4)
SODIUM SERPL-SCNC: 133 MMOL/L (ref 136–145)
WBC # BLD AUTO: 5.42 K/UL (ref 3.9–12.7)
WBC # BLD AUTO: 5.61 K/UL (ref 3.9–12.7)

## 2022-02-05 PROCEDURE — 63700000 PHARM REV CODE 250 ALT 637 W/O HCPCS: Mod: HCNC | Performed by: INTERNAL MEDICINE

## 2022-02-05 PROCEDURE — 11000001 HC ACUTE MED/SURG PRIVATE ROOM: Mod: HCNC

## 2022-02-05 PROCEDURE — 25000003 PHARM REV CODE 250: Mod: HCNC | Performed by: STUDENT IN AN ORGANIZED HEALTH CARE EDUCATION/TRAINING PROGRAM

## 2022-02-05 PROCEDURE — 63600175 PHARM REV CODE 636 W HCPCS: Mod: HCNC | Performed by: NURSE PRACTITIONER

## 2022-02-05 PROCEDURE — 83735 ASSAY OF MAGNESIUM: CPT | Mod: HCNC | Performed by: HOSPITALIST

## 2022-02-05 PROCEDURE — 84100 ASSAY OF PHOSPHORUS: CPT | Mod: HCNC | Performed by: HOSPITALIST

## 2022-02-05 PROCEDURE — 80048 BASIC METABOLIC PNL TOTAL CA: CPT | Mod: HCNC | Performed by: HOSPITALIST

## 2022-02-05 PROCEDURE — 99231 SBSQ HOSP IP/OBS SF/LOW 25: CPT | Mod: ,,, | Performed by: UROLOGY

## 2022-02-05 PROCEDURE — 94799 UNLISTED PULMONARY SVC/PX: CPT | Mod: HCNC

## 2022-02-05 PROCEDURE — 36415 COLL VENOUS BLD VENIPUNCTURE: CPT | Mod: HCNC | Performed by: NURSE PRACTITIONER

## 2022-02-05 PROCEDURE — 27000221 HC OXYGEN, UP TO 24 HOURS: Mod: HCNC

## 2022-02-05 PROCEDURE — 99900035 HC TECH TIME PER 15 MIN (STAT): Mod: HCNC

## 2022-02-05 PROCEDURE — 36415 COLL VENOUS BLD VENIPUNCTURE: CPT | Mod: HCNC | Performed by: HOSPITALIST

## 2022-02-05 PROCEDURE — 25000003 PHARM REV CODE 250: Mod: HCNC | Performed by: NURSE PRACTITIONER

## 2022-02-05 PROCEDURE — 85025 COMPLETE CBC W/AUTO DIFF WBC: CPT | Mod: 91,HCNC | Performed by: HOSPITALIST

## 2022-02-05 PROCEDURE — 63600175 PHARM REV CODE 636 W HCPCS: Mod: HCNC | Performed by: EMERGENCY MEDICINE

## 2022-02-05 PROCEDURE — 85025 COMPLETE CBC W/AUTO DIFF WBC: CPT | Mod: HCNC | Performed by: NURSE PRACTITIONER

## 2022-02-05 PROCEDURE — 25000003 PHARM REV CODE 250: Mod: HCNC | Performed by: HOSPITALIST

## 2022-02-05 PROCEDURE — 94761 N-INVAS EAR/PLS OXIMETRY MLT: CPT | Mod: HCNC

## 2022-02-05 PROCEDURE — 99231 PR SUBSEQUENT HOSPITAL CARE,LEVL I: ICD-10-PCS | Mod: ,,, | Performed by: UROLOGY

## 2022-02-05 PROCEDURE — 94660 CPAP INITIATION&MGMT: CPT | Mod: HCNC

## 2022-02-05 RX ORDER — SEVELAMER CARBONATE 800 MG/1
800 TABLET, FILM COATED ORAL
Status: DISCONTINUED | OUTPATIENT
Start: 2022-02-05 | End: 2022-02-13

## 2022-02-05 RX ORDER — SODIUM BICARBONATE 650 MG/1
650 TABLET ORAL 3 TIMES DAILY
Status: DISCONTINUED | OUTPATIENT
Start: 2022-02-05 | End: 2022-02-08

## 2022-02-05 RX ORDER — MUPIROCIN 20 MG/G
OINTMENT TOPICAL 2 TIMES DAILY
Status: COMPLETED | OUTPATIENT
Start: 2022-02-05 | End: 2022-02-10

## 2022-02-05 RX ORDER — HYDROXYZINE PAMOATE 25 MG/1
25 CAPSULE ORAL EVERY 8 HOURS PRN
Status: DISCONTINUED | OUTPATIENT
Start: 2022-02-05 | End: 2022-02-09

## 2022-02-05 RX ADMIN — FLUCONAZOLE 100 MG: 100 TABLET ORAL at 08:02

## 2022-02-05 RX ADMIN — HEPARIN SODIUM 5000 UNITS: 5000 INJECTION INTRAVENOUS; SUBCUTANEOUS at 09:02

## 2022-02-05 RX ADMIN — SODIUM BICARBONATE 650 MG: 650 TABLET ORAL at 08:02

## 2022-02-05 RX ADMIN — CEFEPIME 1 G: 1 INJECTION, POWDER, FOR SOLUTION INTRAMUSCULAR; INTRAVENOUS at 08:02

## 2022-02-05 RX ADMIN — SEVELAMER CARBONATE 800 MG: 800 TABLET, FILM COATED ORAL at 05:02

## 2022-02-05 RX ADMIN — HYDROXYZINE PAMOATE 25 MG: 25 CAPSULE ORAL at 03:02

## 2022-02-05 RX ADMIN — LEVOTHYROXINE SODIUM 125 MCG: 0.03 TABLET ORAL at 05:02

## 2022-02-05 RX ADMIN — ATORVASTATIN CALCIUM 80 MG: 40 TABLET, FILM COATED ORAL at 08:02

## 2022-02-05 RX ADMIN — QUETIAPINE FUMARATE 200 MG: 100 TABLET ORAL at 08:02

## 2022-02-05 RX ADMIN — MUPIROCIN: 20 OINTMENT TOPICAL at 08:02

## 2022-02-05 RX ADMIN — SODIUM BICARBONATE 650 MG: 650 TABLET ORAL at 03:02

## 2022-02-05 RX ADMIN — HEPARIN SODIUM 5000 UNITS: 5000 INJECTION INTRAVENOUS; SUBCUTANEOUS at 05:02

## 2022-02-05 RX ADMIN — OXYBUTYNIN CHLORIDE 15 MG: 5 TABLET, EXTENDED RELEASE ORAL at 08:02

## 2022-02-05 RX ADMIN — ASPIRIN 81 MG: 81 TABLET, COATED ORAL at 08:02

## 2022-02-05 RX ADMIN — TRAZODONE HYDROCHLORIDE 300 MG: 100 TABLET ORAL at 08:02

## 2022-02-05 RX ADMIN — HEPARIN SODIUM 5000 UNITS: 5000 INJECTION INTRAVENOUS; SUBCUTANEOUS at 03:02

## 2022-02-05 RX ADMIN — FLUOXETINE HYDROCHLORIDE 60 MG: 20 CAPSULE ORAL at 08:02

## 2022-02-05 RX ADMIN — STANDARDIZED SENNA CONCENTRATE 2 TABLET: 8.6 TABLET ORAL at 08:02

## 2022-02-05 RX ADMIN — HYDROCODONE BITARTRATE AND ACETAMINOPHEN 1 TABLET: 10; 325 TABLET ORAL at 03:02

## 2022-02-05 RX ADMIN — PANTOPRAZOLE SODIUM 40 MG: 40 TABLET, DELAYED RELEASE ORAL at 08:02

## 2022-02-05 NOTE — ASSESSMENT & PLAN NOTE
BUN 22. Creatinine 7.2, baseline 1.2  Reports poor PO intake, N/V, decreased urine output  Catheter draining  Given 2.5L IV fluids in ED  -initially treated with IV fluids without improvement; will stop  -consult nephrology for eval  -hold torsemide  -renally dose meds  -repeat BMP daily

## 2022-02-05 NOTE — PROGRESS NOTES
[facility] Medicine  Progress Note    Patient Name: Tasha Hawley  MRN: [id number]  Patient Class: IP- Inpatient   Admission Date: 2/3/2022  Length of Stay: 2 days  Attending Physician: Nic Mistry, *  Primary Care Provider: Mesfin Hodges Ii, MD        Subjective:     Principal Problem:Acute renal failure        HPI:  Tasha Hawley is a 65 yo female with a pmh of neurogenic bladder, suprapubic catheter,recurrent UTI, chronic pain with epidural pain pump, CAD, hypothyroidism, sleep apnea. She c/o malaise, poor PO intake, N/V, chills, decreased urine output, weakness, chronic pain. She was seen by ID today and noted to have cloudy urine. She just finished a course of cipro and has recurrent UTIs. She states she has not been feeling well for a few weeks. She feels as though she is leaking urine from her urethra. She has home health with [facility] and states they changed her catheter 2 days ago (no record on chart) and she is not sure if a urine sample was obtained at that time. She has chronic swelling to her lower legs and walks with a walker. She fell a few days ago with no injury.       Overview/Hospital Course:  No notes on file    Interval History: insomnia overnight. She reports significant anxiety. Denies abdominal pain.    Review of Systems   Constitutional: Positive for appetite change. Negative for fever.   Respiratory: Negative for shortness of breath.    Cardiovascular: Negative for chest pain.   Genitourinary: Positive for difficulty urinating.   Neurological: Positive for weakness.   Psychiatric/Behavioral: Positive for sleep disturbance.     Objective:     Vital Signs (Most Recent):  Temp: 97.7 °F (36.5 °C) (02/05/22 1120)  Pulse: (!) 53 (02/05/22 1120)  Resp: 18 (02/05/22 1120)  BP: 127/64 (02/05/22 1120)  SpO2: 95 % (02/05/22 1130) Vital Signs (24h Range):  Temp:  [97.7 °F (36.5 °C)-98.5 °F (36.9 °C)] 97.7 °F (36.5 °C)  Pulse:  [43-59] 53  Resp:  [18] 18  SpO2:  [82 %-95 %]  95 %  BP: (120-134)/(55-77) 127/64     Weight: 92 kg (202 lb 13.2 oz)  Body mass index is 34.81 kg/m².    Intake/Output Summary (Last 24 hours) at 2/5/2022 1511  Last data filed at 2/5/2022 1200  Gross per 24 hour   Intake 1354.16 ml   Output 700 ml   Net 654.16 ml      Physical Exam  Vitals and nursing note reviewed.   Constitutional:       General: She is not in acute distress.     Appearance: She is ill-appearing.   HENT:      Head: Normocephalic and atraumatic.      Nose: Nose normal.      Mouth/Throat:      Mouth: Mucous membranes are moist.   Eyes:      Pupils: Pupils are equal, round, and reactive to light.   Cardiovascular:      Rate and Rhythm: Regular rhythm. Bradycardia present.      Pulses: Normal pulses.      Heart sounds: Normal heart sounds.   Pulmonary:      Effort: Pulmonary effort is normal.      Breath sounds: Normal breath sounds.   Abdominal:      General: Bowel sounds are normal.      Palpations: Abdomen is soft.      Comments: Suprapubic catheter   Musculoskeletal:         General: Tenderness present. Normal range of motion.      Cervical back: Normal range of motion.      Right lower leg: Edema present.      Left lower leg: Edema present.   Skin:     General: Skin is warm and dry.   Neurological:      Mental Status: She is alert and oriented to person, place, and time.      Motor: Weakness present.      Comments: R facial droop, chronic per patient   Psychiatric:         Behavior: Behavior normal.         Significant Labs: All pertinent labs within the past 24 hours have been reviewed.    Significant Imaging: I have reviewed all pertinent imaging results/findings within the past 24 hours.      Assessment/Plan:      * Acute renal failure  BUN 22. Creatinine 7.2, baseline 1.2  Reports poor PO intake, N/V, decreased urine output  Catheter draining  Given 2.5L IV fluids in ED  -initially treated with IV fluids without improvement; will stop  -consult nephrology for eval  -hold torsemide  -renally  dose meds  -repeat BMP daily      Anxiety  - hydroxyzine prn      Hyponatremia  Na 129, baseline 139  In setting of CARITO and poor PO intake  -given 2.5L LR in ED  -cont LR   -monitor labs  -hold torsemide  -nephrology consulted    Bilateral lower extremity edema  Chronic  Hold torsemide for renal failure      Urinary tract infection associated with cystostomy catheter  Recurrent UTI, completed course of cipro 2 days ago  Followed by ID and urology outpatient  Dark, cloudy urine noted. Patient c/o malaise, chills, N/V, weakness, decreased PO intake  Given a dose of rocephin in ED  -switched to cefepime given recent urine culture sensitivities on 1/21  -urine and blood cx pending; also with candida in urine which may be colonization, on fluconazole for now per Nephro  -consult urology for catheter change and eval, performed on 2/4          Chronic diastolic heart failure  Denies SOB, does have chronic BLE  No respiratory distress noted on room air  -chest XRAY with bilateral atelectasis  -hold torsemide 2/2 renal failure      Bradycardia  Chronic  EKG not in chart  HR 40s-50s, asymptomatic  Cardiac monitoring      Primary hypothyroidism  Cont synthroid      Frequent falls  Consult PT/OT for eval: recommend HH      Neurogenic bladder  Has chronic indwelling meadows  -cont ditropan  -discontinue zanaflex        Chronic pain syndrome  Has dilaudid intrathecal pump, states it doesn't work  -cont home norco dose  -bowel regimen  -monitor closely       Major depressive disorder, recurrent, mild  Cont prozac      Sleep apnea  Non-compliant with CPAP at home  O2 sat drops while sleeping  -CPAP at night        VTE Risk Mitigation (From admission, onward)         Ordered     heparin (porcine) injection 5,000 Units  Every 8 hours         02/03/22 1734     IP VTE HIGH RISK PATIENT  Once         02/03/22 1734     Place sequential compression device  Until discontinued         02/03/22 1734                Discharge Planning   JACKIE:  2/6/2022     Code Status: Full Code   Is the patient medically ready for discharge?:     Reason for patient still in hospital (select all that apply): Patient trending condition and Consult recommendations                     Nic Mistry MD  Department of Hospital Medicine   [facility]

## 2022-02-05 NOTE — PROGRESS NOTES
Nephrology Progress Note     Consult Requested By: Nic Mistry, *  Reason for Consult: CARITO    SUBJECTIVE:      ?    Review of Systems   Constitutional: Positive for malaise/fatigue. Negative for chills and fever.   HENT: Negative for congestion and sore throat.    Eyes: Negative for blurred vision, double vision and photophobia.   Respiratory: Negative for cough and shortness of breath.    Cardiovascular: Negative for chest pain, palpitations and leg swelling.   Gastrointestinal: Negative for abdominal pain, diarrhea, nausea and vomiting.   Genitourinary: Negative for dysuria and urgency.   Musculoskeletal: Negative for joint pain and myalgias.   Skin: Negative for itching and rash.   Neurological: Positive for weakness. Negative for dizziness, sensory change and headaches.   Endo/Heme/Allergies: Negative for polydipsia. Does not bruise/bleed easily.   Psychiatric/Behavioral: Positive for memory loss. Negative for depression. The patient is nervous/anxious and has insomnia.        Past Medical History:   Diagnosis Date    Anticoagulant long-term use     Anxiety     Arthritis     Bilateral lower extremity edema     severe chronic    Carotid artery occlusion     Cataract     CHF (congestive heart failure)     Coronary artery disease     subtotalled LAD with collateral    Depression     Fever blister     Hypothyroid     Iron deficiency anemia     Lumbar radiculopathy     with chronic pain    Ocular migraine     Renal disorder     Sleep apnea     cpap     Past Surgical History:   Procedure Laterality Date    ADENOIDECTOMY      BACK SURGERY      x 12    CARDIAC CATHETERIZATION  2016    subtotalled LAD with right to left collaterals    CATARACT EXTRACTION W/  INTRAOCULAR LENS IMPLANT Left     Dr Coleman     CYSTOSCOPIC LITHOLAPAXY N/A 6/27/2019    Procedure: CYSTOLITHOLAPAXY;  Surgeon: Shireen Mayo MD;  Location: [facility] OR [address] [place] [address];  Service: Urology;  Laterality: N/A;    CYSTOSCOPIC  LITHOLAPAXY N/A 9/3/2019    Procedure: CYSTOLITHOLAPAXY;  Surgeon: Shireen Mayo MD;  Location: [facility] OR 2ND FLR;  Service: Urology;  Laterality: N/A;    CYSTOSCOPY N/A 7/13/2021    Procedure: CYSTOSCOPY;  Surgeon: Shireen Mayo MD;  Location: [facility] OR 1ST FLR;  Service: Urology;  Laterality: N/A;    CYSTOSCOPY  11/16/2021    Procedure: CYSTOSCOPY;  Surgeon: Shireen Mayo MD;  Location: [facility] OR 1ST FLR;  Service: Urology;;    ESOPHAGOGASTRODUODENOSCOPY N/A 5/23/2018    Procedure: ESOPHAGOGASTRODUODENOSCOPY (EGD);  Surgeon: Prince Vance MD;  Location: [facility] (4TH FLR);  Service: Endoscopy;  Laterality: N/A;  r/s 'd per Dr. Vance due to family emergency- ER    HYSTERECTOMY  1975    endometriosis    INJECTION OF BOTULINUM TOXIN TYPE A  7/13/2021    Procedure: INJECTION, BOTULINUM TOXIN, 200units;  Surgeon: Shireen Mayo MD;  Location: [facility] OR 1ST FLR;  Service: Urology;;    INJECTION OF BOTULINUM TOXIN TYPE A  11/16/2021    Procedure: INJECTION, BOTULINUM TOXIN, 200units;  Surgeon: Shireen Mayo MD;  Location: [facility] OR 1ST FLR;  Service: Urology;;    pain pump placement      SQ Dilaudid Pump managed by Dr. Hillman, Pain Management    REPLACEMENT OF CATHETER N/A 10/31/2019    Procedure: REPLACEMENT, CATHETER-SUPRAPUBIC;  Surgeon: Shireen Mayo MD;  [facility];  Service: Urology;  Laterality: N/A;    SPINAL CORD STIMULATOR REMOVAL      before [place]    SPINE SURGERY  5-13-13    CERVICAL FUSION    TONSILLECTOMY       Family History   Problem Relation Age of Onset    Cancer Mother 55        breast    Cancer Father         esophagus,had laryngectomy    Esophageal cancer Father     Parkinsonism Maternal Grandmother     Tremor Maternal Grandmother     No Known Problems Brother     No Known Problems Brother     Heart disease Maternal Uncle     Colon cancer Maternal Uncle         Less than 60    No Known Problems Sister     No Known Problems Maternal Aunt      Cirrhosis Paternal Aunt         ETOH    Liver disease Paternal Aunt         ETOH    Liver disease Paternal Uncle         ETOH    Cirrhosis Paternal Uncle         ETOH    No Known Problems Maternal Grandfather     No Known Problems Paternal Grandmother     No Known Problems Paternal Grandfather     Melanoma Neg Hx     Amblyopia Neg Hx     Blindness Neg Hx     Cataracts Neg Hx     Diabetes Neg Hx     Glaucoma Neg Hx     Hypertension Neg Hx     Macular degeneration Neg Hx     Retinal detachment Neg Hx     Strabismus Neg Hx     Stroke Neg Hx     Thyroid disease Neg Hx     Celiac disease Neg Hx     Colon polyps Neg Hx     Cystic fibrosis Neg Hx     Crohn's disease Neg Hx     Inflammatory bowel disease Neg Hx     Liver cancer Neg Hx     Rectal cancer Neg Hx     Stomach cancer Neg Hx     Ulcerative colitis Neg Hx     Lymphoma Neg Hx      Social History     Tobacco Use    Smoking status: Never Smoker    Smokeless tobacco: Never Used   Substance Use Topics    Alcohol use: Never    Drug use: No       Review of patient's allergies indicates:   Allergen Reactions    (d)-limonene flavor      Other reaction(s): difficult intubation  Other reaction(s): Difficulty breathing    Bactrim [sulfamethoxazole-trimethoprim] Anaphylaxis    Benadryl [diphenhydramine hcl] Shortness Of Breath    Fentanyl Itching, Nausea And Vomiting and Swelling             Imitrex [sumatriptan succinate] Shortness Of Breath    Topamax [topiramate] Shortness Of Breath    Vancomycin Shortness Of Breath     Rash    Butorphanol tartrate     Darvocet a500 [propoxyphene n-acetaminophen]      Other reaction(s): Difficulty breathing    White petrolatum-zinc     Zinc oxide-white petrolatum      Other reaction(s): Difficulty breathing    Latex, natural rubber Itching and Rash    Phenytoin Rash and Other (See Comments)     Trouble breathing            OBJECTIVE:     Vital Signs (Most Recent)  Vitals:    02/05/22 0743  02/05/22 0800 02/05/22 1120 02/05/22 1130   BP: 134/65  127/64    BP Location:       Patient Position: Lying  Lying    Pulse: (!) 59  (!) 53    Resp: 18  18    Temp: 98.1 °F (36.7 °C)  97.7 °F (36.5 °C)    TempSrc: Oral  Oral    SpO2: (!) 90% (!) 94%  95%   Weight:       Height:                     Medications:   aspirin  81 mg Oral Daily    atorvastatin  80 mg Oral Daily    ceFEPime (MAXIPIME) IVPB  1 g Intravenous Q24H    fluconazole  100 mg Oral Daily    FLUoxetine  60 mg Oral Daily    heparin (porcine)  5,000 Units Subcutaneous Q8H    levothyroxine  125 mcg Oral Before breakfast    oxybutynin  15 mg Oral Daily    pantoprazole  40 mg Oral Daily    polyethylene glycol  17 g Oral Daily    QUEtiapine  200 mg Oral Nightly    senna  2 tablet Oral BID    traZODone  300 mg Oral QHS           Physical Exam  Vitals and nursing note reviewed.   Constitutional:       General: She is not in acute distress.     Appearance: She is obese. She is ill-appearing. She is not diaphoretic.   HENT:      Head: Normocephalic and atraumatic.      Mouth/Throat:      Pharynx: No oropharyngeal exudate.   Eyes:      General: No scleral icterus.     Extraocular Movements: EOM normal.      Conjunctiva/sclera: Conjunctivae normal.      Pupils: Pupils are equal, round, and reactive to light.   Cardiovascular:      Rate and Rhythm: Normal rate and regular rhythm.      Heart sounds: Normal heart sounds. No murmur heard.      Pulmonary:      Effort: Pulmonary effort is normal. No respiratory distress.      Breath sounds: Normal breath sounds.   Abdominal:      General: Bowel sounds are normal. There is no distension.      Palpations: Abdomen is soft.      Tenderness: There is no abdominal tenderness.   Genitourinary:     Comments: Suprapubic catheter   Musculoskeletal:         General: No edema. Normal range of motion.      Cervical back: Normal range of motion and neck supple.   Skin:     General: Skin is warm and dry.      Findings:  No erythema.   Neurological:      Mental Status: She is alert and oriented to person, place, and time.      Cranial Nerves: No cranial nerve deficit.   Psychiatric:         Mood and Affect: Affect normal.         Cognition and Memory: Memory normal.         Judgment: Judgment normal.         Laboratory:  Recent Labs   Lab 02/04/22 0437 02/04/22 0437 02/05/22 0308 02/05/22 0527   WBC 4.87  --  5.42 5.61   HGB 11.0*  --  11.2* 11.3*   HCT 32.9*  --  33.7* 34.9*     --  192 175   MONO 11.1  0.5   < > 12.0  0.7 10.9  0.6    < > = values in this interval not displayed.     Recent Labs   Lab 02/04/22 0437 02/04/22  1231 02/05/22 0527   * 129* 133*   K 3.8 4.3 4.2   CL 95 94* 97   CO2 23 20* 22*   BUN 22 23 24*   CREATININE 7.2* 7.2* 7.4*   CALCIUM 7.9* 8.3* 8.0*   PHOS 5.3*  --  5.8*       Diagnostic Results:  X-Ray: Reviewed  US: Reviewed  Echo: Reviewed  ASSESSMENT/PLAN:     1. CARITO/Acidosis/Hyponatremia   - as of now ATN due to UTI US kidney noncontributory   -- Cr still up 7.2-7.4 no uremia symptoms however hard to assess unknown patient baseline mental and functional  -- Treat UTI now on both fluconazole and Ceftriaxone   -- Discussed with her and  may need Dialysis both want everything to be done to prolong her life   -- risk and benefits were discussed as well.    -- Stop IVF has trace edema chronic but no improvement ib Cr not pre renal at all.   Good UOP 1700   2. HTN (I10) - at range   3. Anemia    Recent Labs   Lab 02/04/22 0437 02/05/22 0308 02/05/22 0527   HGB 11.0* 11.2* 11.3*   HCT 32.9* 33.7* 34.9*    192 175       Iron   Lab Results   Component Value Date    IRON 92 06/10/2021    TIBC 330 06/10/2021    FERRITIN 121 06/10/2021       4. MBD (E88.9 M90.80) -  Recent Labs   Lab 02/05/22 0527   CALCIUM 8.0*   PHOS 5.8*     Recent Labs   Lab 02/04/22  0437 02/05/22 0527   MG 1.8 1.9   Sevelamer     Lab Results   Component Value Date    CALCIUM 8.0 (L) 02/05/2022    PHOS  5.8 (H) 02/05/2022     Lab Results   Component Value Date    OXFLARRV27PS 18 (L) 10/19/2020   calcitriol     Lab Results   Component Value Date    CO2 22 (L) 02/05/2022       5. Acidosis start PO bicarb   6. Nutrition/Hypoalbuminemia (E88.09) -   Recent Labs   Lab 02/03/22  1328   ALBUMIN 3.9     Nepro with meals TID. Renal vitamins daily      Thank you for the consult, will follow  With any question please call [phone]  Fernando Booker MD    [facility]  NIMA Wheeler MD, FACP,   NIEVES Pabon MD,   MD ELIS Gutierrez MD E. V. Harmon, NP    [address]  [place], [address] [phone]

## 2022-02-05 NOTE — ASSESSMENT & PLAN NOTE
Recurrent UTI, completed course of cipro 2 days ago  Followed by ID and urology outpatient  Dark, cloudy urine noted. Patient c/o malaise, chills, N/V, weakness, decreased PO intake  Given a dose of rocephin in ED  -switched to cefepime given recent urine culture sensitivities on 1/21  -urine and blood cx pending; also with candida in urine which may be colonization, on fluconazole for now per Nephro  -consult urology for catheter change and eval, performed on 2/4

## 2022-02-05 NOTE — PROGRESS NOTES
[place] [facility]  Urology  Progress Note    Patient Name: Tasha Hawley  MRN: [id number]  Admission Date: 2/3/2022  Hospital Length of Stay: 2 days      Subjective:     Interval History: LILIAN. Tolerating SPT.  Draining well.      Objective:     Temp:  [97.7 °F (36.5 °C)-98.5 °F (36.9 °C)] 97.7 °F (36.5 °C)  Pulse:  [43-59] 53  Resp:  [14-18] 18  SpO2:  [82 %-95 %] 95 %  BP: (120-134)/(55-77) 127/64     Body mass index is 34.81 kg/m².        NAD  RRR  CTAB  ABD S/ND/NTTP       SPT clear              Significant Labs:  BMP:  Recent Labs   Lab 02/04/22  0437 02/04/22  1231 02/05/22  0527   * 129* 133*   K 3.8 4.3 4.2   CL 95 94* 97   CO2 23 20* 22*   BUN 22 23 24*   CREATININE 7.2* 7.2* 7.4*   CALCIUM 7.9* 8.3* 8.0*       CBC:  Recent Labs   Lab 02/04/22  0437 02/05/22  0308 02/05/22  0527   WBC 4.87 5.42 5.61   HGB 11.0* 11.2* 11.3*   HCT 32.9* 33.7* 34.9*    192 175     RANDEE:    Impression:     1. Decreased bilateral renal perfusion which may be seen in the setting of medical renal disease.  No hydronephrosis.  2. Patient with suprapubic catheter.  Bladder is decompressed and not well visualized.    Assessment/Plan:     Urinary tract infection associated with cystostomy catheter  65 yo F with chronic NGB followed at [facility] with concern for UTI  - CT showed ? Bladder tissue around tip of meadows, low clinical suspicion of perforation  - SPT changed at bedside 2/4, culture pending  - RANDEE 2/4: no hydro, bladder decompressed  -- Candida on UCx: SPT changed on admit    CARITO  Cr 7.2 on admit  CT w/o hydronephrosis, RANDEE w/o hydronephrosis    Plan  1.  RANDEE and CT show no obstruction, good UOP via SPT  2.  Treatment CARITO per nephrology  3.  Candida treatment per primary, SPT changed on admit  4.  Will sign off please call with questions, needs to follow up with her Urologist (at [facility]) after discharge     Acute renal failure      Myles Meneses MD  Urology

## 2022-02-05 NOTE — SUBJECTIVE & OBJECTIVE
Interval History: insomnia overnight. She reports significant anxiety. Denies abdominal pain.    Review of Systems   Constitutional: Positive for appetite change. Negative for fever.   Respiratory: Negative for shortness of breath.    Cardiovascular: Negative for chest pain.   Genitourinary: Positive for difficulty urinating.   Neurological: Positive for weakness.   Psychiatric/Behavioral: Positive for sleep disturbance.     Objective:     Vital Signs (Most Recent):  Temp: 97.7 °F (36.5 °C) (02/05/22 1120)  Pulse: (!) 53 (02/05/22 1120)  Resp: 18 (02/05/22 1120)  BP: 127/64 (02/05/22 1120)  SpO2: 95 % (02/05/22 1130) Vital Signs (24h Range):  Temp:  [97.7 °F (36.5 °C)-98.5 °F (36.9 °C)] 97.7 °F (36.5 °C)  Pulse:  [43-59] 53  Resp:  [18] 18  SpO2:  [82 %-95 %] 95 %  BP: (120-134)/(55-77) 127/64     Weight: 92 kg (202 lb 13.2 oz)  Body mass index is 34.81 kg/m².    Intake/Output Summary (Last 24 hours) at 2/5/2022 1511  Last data filed at 2/5/2022 1200  Gross per 24 hour   Intake 1354.16 ml   Output 700 ml   Net 654.16 ml      Physical Exam  Vitals and nursing note reviewed.   Constitutional:       General: She is not in acute distress.     Appearance: She is ill-appearing.   HENT:      Head: Normocephalic and atraumatic.      Nose: Nose normal.      Mouth/Throat:      Mouth: Mucous membranes are moist.   Eyes:      Pupils: Pupils are equal, round, and reactive to light.   Cardiovascular:      Rate and Rhythm: Regular rhythm. Bradycardia present.      Pulses: Normal pulses.      Heart sounds: Normal heart sounds.   Pulmonary:      Effort: Pulmonary effort is normal.      Breath sounds: Normal breath sounds.   Abdominal:      General: Bowel sounds are normal.      Palpations: Abdomen is soft.      Comments: Suprapubic catheter   Musculoskeletal:         General: Tenderness present. Normal range of motion.      Cervical back: Normal range of motion.      Right lower leg: Edema present.      Left lower leg: Edema  present.   Skin:     General: Skin is warm and dry.   Neurological:      Mental Status: She is alert and oriented to person, place, and time.      Motor: Weakness present.      Comments: R facial droop, chronic per patient   Psychiatric:         Behavior: Behavior normal.         Significant Labs: All pertinent labs within the past 24 hours have been reviewed.    Significant Imaging: I have reviewed all pertinent imaging results/findings within the past 24 hours.

## 2022-02-05 NOTE — NURSING
02/05/22 0138   Patient Request   Patient Requested notified bedside nurse of MN saturation of 89% on RA; requested recheck   Nurse Notification   Bedside Nurse Notified? Yes   Name of Bedside Nurse Zaida Saleh LPN   Nurse Notfication Method Secure Chat   Nurse Notified Of Flowsheet Documentation

## 2022-02-06 PROBLEM — R10.9 ABDOMINAL PAIN: Status: ACTIVE | Noted: 2022-02-06

## 2022-02-06 PROBLEM — N17.0 ACUTE RENAL FAILURE WITH TUBULAR NECROSIS: Status: ACTIVE | Noted: 2022-02-03

## 2022-02-06 LAB
ANION GAP SERPL CALC-SCNC: 12 MMOL/L (ref 8–16)
BUN SERPL-MCNC: 24 MG/DL (ref 8–23)
CALCIUM SERPL-MCNC: 8 MG/DL (ref 8.7–10.5)
CHLORIDE SERPL-SCNC: 97 MMOL/L (ref 95–110)
CK SERPL-CCNC: 94 U/L (ref 20–180)
CO2 SERPL-SCNC: 27 MMOL/L (ref 23–29)
CREAT SERPL-MCNC: 7.2 MG/DL (ref 0.5–1.4)
EST. GFR  (AFRICAN AMERICAN): 6 ML/MIN/1.73 M^2
EST. GFR  (NON AFRICAN AMERICAN): 5 ML/MIN/1.73 M^2
GLUCOSE SERPL-MCNC: 82 MG/DL (ref 70–110)
LIPASE SERPL-CCNC: 75 U/L (ref 4–60)
MAGNESIUM SERPL-MCNC: 1.9 MG/DL (ref 1.6–2.6)
PHOSPHATE SERPL-MCNC: 5.5 MG/DL (ref 2.7–4.5)
POTASSIUM SERPL-SCNC: 3.9 MMOL/L (ref 3.5–5.1)
SODIUM SERPL-SCNC: 136 MMOL/L (ref 136–145)

## 2022-02-06 PROCEDURE — 84100 ASSAY OF PHOSPHORUS: CPT | Mod: HCNC | Performed by: HOSPITALIST

## 2022-02-06 PROCEDURE — 83690 ASSAY OF LIPASE: CPT | Mod: HCNC | Performed by: HOSPITALIST

## 2022-02-06 PROCEDURE — 63600175 PHARM REV CODE 636 W HCPCS: Mod: HCNC | Performed by: NURSE PRACTITIONER

## 2022-02-06 PROCEDURE — 27000221 HC OXYGEN, UP TO 24 HOURS: Mod: HCNC

## 2022-02-06 PROCEDURE — 83735 ASSAY OF MAGNESIUM: CPT | Mod: HCNC | Performed by: HOSPITALIST

## 2022-02-06 PROCEDURE — 25000003 PHARM REV CODE 250: Mod: HCNC | Performed by: NURSE PRACTITIONER

## 2022-02-06 PROCEDURE — 25000003 PHARM REV CODE 250: Mod: HCNC | Performed by: HOSPITALIST

## 2022-02-06 PROCEDURE — 94761 N-INVAS EAR/PLS OXIMETRY MLT: CPT | Mod: HCNC

## 2022-02-06 PROCEDURE — 97116 GAIT TRAINING THERAPY: CPT | Mod: HCNC,CQ

## 2022-02-06 PROCEDURE — 63700000 PHARM REV CODE 250 ALT 637 W/O HCPCS: Mod: HCNC | Performed by: INTERNAL MEDICINE

## 2022-02-06 PROCEDURE — 94799 UNLISTED PULMONARY SVC/PX: CPT | Mod: HCNC

## 2022-02-06 PROCEDURE — 80048 BASIC METABOLIC PNL TOTAL CA: CPT | Mod: HCNC | Performed by: HOSPITALIST

## 2022-02-06 PROCEDURE — 97530 THERAPEUTIC ACTIVITIES: CPT | Mod: HCNC,CQ

## 2022-02-06 PROCEDURE — 11000001 HC ACUTE MED/SURG PRIVATE ROOM: Mod: HCNC

## 2022-02-06 PROCEDURE — 63600175 PHARM REV CODE 636 W HCPCS: Mod: HCNC | Performed by: EMERGENCY MEDICINE

## 2022-02-06 PROCEDURE — 36415 COLL VENOUS BLD VENIPUNCTURE: CPT | Mod: HCNC | Performed by: HOSPITALIST

## 2022-02-06 PROCEDURE — 99900035 HC TECH TIME PER 15 MIN (STAT): Mod: HCNC

## 2022-02-06 PROCEDURE — 25000003 PHARM REV CODE 250: Mod: HCNC | Performed by: STUDENT IN AN ORGANIZED HEALTH CARE EDUCATION/TRAINING PROGRAM

## 2022-02-06 PROCEDURE — 82550 ASSAY OF CK (CPK): CPT | Mod: HCNC | Performed by: HOSPITALIST

## 2022-02-06 PROCEDURE — 94660 CPAP INITIATION&MGMT: CPT | Mod: HCNC

## 2022-02-06 RX ORDER — LACTULOSE 10 G/15ML
30 SOLUTION ORAL ONCE
Status: COMPLETED | OUTPATIENT
Start: 2022-02-06 | End: 2022-02-06

## 2022-02-06 RX ORDER — HYOSCYAMINE SULFATE 0.12 MG/1
0.12 TABLET SUBLINGUAL EVERY 4 HOURS PRN
Status: DISCONTINUED | OUTPATIENT
Start: 2022-02-06 | End: 2022-02-17 | Stop reason: HOSPADM

## 2022-02-06 RX ADMIN — MUPIROCIN: 20 OINTMENT TOPICAL at 09:02

## 2022-02-06 RX ADMIN — HEPARIN SODIUM 5000 UNITS: 5000 INJECTION INTRAVENOUS; SUBCUTANEOUS at 04:02

## 2022-02-06 RX ADMIN — OXYBUTYNIN CHLORIDE 15 MG: 5 TABLET, EXTENDED RELEASE ORAL at 09:02

## 2022-02-06 RX ADMIN — STANDARDIZED SENNA CONCENTRATE 2 TABLET: 8.6 TABLET ORAL at 08:02

## 2022-02-06 RX ADMIN — SEVELAMER CARBONATE 800 MG: 800 TABLET, FILM COATED ORAL at 04:02

## 2022-02-06 RX ADMIN — FLUCONAZOLE 100 MG: 100 TABLET ORAL at 09:02

## 2022-02-06 RX ADMIN — ACETAMINOPHEN 650 MG: 325 TABLET ORAL at 10:02

## 2022-02-06 RX ADMIN — ATORVASTATIN CALCIUM 80 MG: 40 TABLET, FILM COATED ORAL at 09:02

## 2022-02-06 RX ADMIN — STANDARDIZED SENNA CONCENTRATE 2 TABLET: 8.6 TABLET ORAL at 09:02

## 2022-02-06 RX ADMIN — SEVELAMER CARBONATE 800 MG: 800 TABLET, FILM COATED ORAL at 12:02

## 2022-02-06 RX ADMIN — HEPARIN SODIUM 5000 UNITS: 5000 INJECTION INTRAVENOUS; SUBCUTANEOUS at 10:02

## 2022-02-06 RX ADMIN — HYDROXYZINE PAMOATE 25 MG: 25 CAPSULE ORAL at 10:02

## 2022-02-06 RX ADMIN — HEPARIN SODIUM 5000 UNITS: 5000 INJECTION INTRAVENOUS; SUBCUTANEOUS at 06:02

## 2022-02-06 RX ADMIN — FLUOXETINE HYDROCHLORIDE 60 MG: 20 CAPSULE ORAL at 09:02

## 2022-02-06 RX ADMIN — TRAZODONE HYDROCHLORIDE 300 MG: 100 TABLET ORAL at 08:02

## 2022-02-06 RX ADMIN — HYDROCODONE BITARTRATE AND ACETAMINOPHEN 1 TABLET: 10; 325 TABLET ORAL at 06:02

## 2022-02-06 RX ADMIN — ASPIRIN 81 MG: 81 TABLET, COATED ORAL at 09:02

## 2022-02-06 RX ADMIN — MUPIROCIN: 20 OINTMENT TOPICAL at 08:02

## 2022-02-06 RX ADMIN — PANTOPRAZOLE SODIUM 40 MG: 40 TABLET, DELAYED RELEASE ORAL at 09:02

## 2022-02-06 RX ADMIN — QUETIAPINE FUMARATE 200 MG: 100 TABLET ORAL at 08:02

## 2022-02-06 RX ADMIN — SODIUM BICARBONATE 650 MG: 650 TABLET ORAL at 09:02

## 2022-02-06 RX ADMIN — SEVELAMER CARBONATE 800 MG: 800 TABLET, FILM COATED ORAL at 07:02

## 2022-02-06 RX ADMIN — LEVOTHYROXINE SODIUM 125 MCG: 0.03 TABLET ORAL at 06:02

## 2022-02-06 RX ADMIN — HYDROCODONE BITARTRATE AND ACETAMINOPHEN 1 TABLET: 10; 325 TABLET ORAL at 10:02

## 2022-02-06 RX ADMIN — SODIUM BICARBONATE 650 MG: 650 TABLET ORAL at 04:02

## 2022-02-06 RX ADMIN — CEFEPIME 1 G: 1 INJECTION, POWDER, FOR SOLUTION INTRAMUSCULAR; INTRAVENOUS at 08:02

## 2022-02-06 RX ADMIN — SODIUM BICARBONATE 650 MG: 650 TABLET ORAL at 08:02

## 2022-02-06 RX ADMIN — LACTULOSE 30 G: 10 SOLUTION ORAL at 10:02

## 2022-02-06 NOTE — PROGRESS NOTES
[facility] Medicine  Progress Note    Patient Name: Tasha Hawley  MRN: [id number]  Patient Class: IP- Inpatient   Admission Date: 2/3/2022  Length of Stay: 3 days  Attending Physician: Nic Mistry, *  Primary Care Provider: Mesfin Hodges Ii, MD        Subjective:     Principal Problem:Acute renal failure with tubular necrosis        HPI:  Tasha Hawley is a 67 yo female with a pmh of neurogenic bladder, suprapubic catheter,recurrent UTI, chronic pain with epidural pain pump, CAD, hypothyroidism, sleep apnea. She c/o malaise, poor PO intake, N/V, chills, decreased urine output, weakness, chronic pain. She was seen by ID today and noted to have cloudy urine. She just finished a course of cipro and has recurrent UTIs. She states she has not been feeling well for a few weeks. She feels as though she is leaking urine from her urethra. She has home health with [facility] and states they changed her catheter 2 days ago (no record on chart) and she is not sure if a urine sample was obtained at that time. She has chronic swelling to her lower legs and walks with a walker. She fell a few days ago with no injury.       Overview/Hospital Course:  No notes on file    Interval History:  Reports some crampy abdominal pain today.  Low appetite.  Discussed plan with the patient and her daughter at bedside.    Review of Systems   Constitutional: Positive for appetite change. Negative for fever.   Respiratory: Negative for shortness of breath.    Cardiovascular: Negative for chest pain.   Genitourinary: Positive for difficulty urinating.   Neurological: Positive for weakness.   Psychiatric/Behavioral: Positive for sleep disturbance.     Objective:     Vital Signs (Most Recent):  Temp: 97.5 °F (36.4 °C) (02/06/22 1125)  Pulse: 73 (02/06/22 1200)  Resp: 18 (02/06/22 1125)  BP: (!) 119/56 (02/06/22 1125)  SpO2: (!) 94 % (02/06/22 1130) Vital Signs (24h Range):  Temp:  [97.5 °F (36.4 °C)-98.4 °F (36.9 °C)]  97.5 °F (36.4 °C)  Pulse:  [40-73] 73  Resp:  [18-24] 18  SpO2:  [86 %-97 %] 94 %  BP: (102-129)/(53-68) 119/56     Weight: 93.1 kg (205 lb 4 oz)  Body mass index is 35.23 kg/m².    Intake/Output Summary (Last 24 hours) at 2/6/2022 1438  Last data filed at 2/6/2022 1200  Gross per 24 hour   Intake 1369.26 ml   Output 3200 ml   Net -1830.74 ml      Physical Exam  Vitals and nursing note reviewed.   Constitutional:       General: She is not in acute distress.     Appearance: She is ill-appearing.   HENT:      Head: Normocephalic and atraumatic.      Nose: Nose normal.      Mouth/Throat:      Mouth: Mucous membranes are moist.   Eyes:      Pupils: Pupils are equal, round, and reactive to light.   Cardiovascular:      Rate and Rhythm: Regular rhythm. Bradycardia present.      Pulses: Normal pulses.      Heart sounds: Normal heart sounds.   Pulmonary:      Effort: Pulmonary effort is normal.      Breath sounds: Normal breath sounds.   Abdominal:      General: Bowel sounds are normal.      Palpations: Abdomen is soft.      Comments: Suprapubic catheter   Musculoskeletal:         General: Tenderness present. Normal range of motion.      Cervical back: Normal range of motion.      Right lower leg: Edema present.      Left lower leg: Edema present.   Skin:     General: Skin is warm and dry.   Neurological:      Mental Status: She is alert and oriented to person, place, and time.      Motor: Weakness present.      Comments: R facial droop, chronic per patient   Psychiatric:         Behavior: Behavior normal.         Significant Labs: All pertinent labs within the past 24 hours have been reviewed.    Significant Imaging: I have reviewed all pertinent imaging results/findings within the past 24 hours.      Assessment/Plan:      * Acute renal failure with tubular necrosis  BUN 22. Creatinine 7.2, baseline 1.2  Reports poor PO intake, N/V, decreased urine output  Catheter draining  Given 2.5L IV fluids in ED  -initially treated  with IV fluids without improvement; will stop  -consult nephrology for eval  -hold torsemide  -renally dose meds  -repeat BMP daily      Abdominal pain  - hyoscyamine prn  - bowel regimen      Anxiety  - hydroxyzine prn      Hyponatremia  Na 129, baseline 139  In setting of CARITO and poor PO intake  -given 2.5L LR in ED  -cont LR   -monitor labs  -hold torsemide  -nephrology consulted    Bilateral lower extremity edema  Chronic  Hold torsemide for renal failure      Urinary tract infection associated with cystostomy catheter  Recurrent UTI, completed course of cipro 2 days ago  Followed by ID and urology outpatient  Dark, cloudy urine noted. Patient c/o malaise, chills, N/V, weakness, decreased PO intake  Given a dose of rocephin in ED  -switched to cefepime given recent urine culture sensitivities on 1/21  -urine and blood cx pending; also with candida in urine which may be colonization, on fluconazole for now per Nephro  -consult urology for catheter change and eval, performed on 2/4          Chronic diastolic heart failure  Denies SOB, does have chronic BLE  No respiratory distress noted on room air  -chest XRAY with bilateral atelectasis; incentive spirometry  -hold torsemide 2/2 renal failure      Bradycardia  Chronic  EKG not in chart  HR 40s-50s, asymptomatic  Cardiac monitoring      Primary hypothyroidism  Cont synthroid      Frequent falls  Consult PT/OT for eval: recommend HH      Neurogenic bladder  Has chronic indwelling meadows  -cont ditropan  -discontinue zanaflex        Chronic pain syndrome  Has dilaudid intrathecal pump, states it doesn't work  -cont home norco dose  -bowel regimen  -monitor closely       Major depressive disorder, recurrent, mild  Cont prozac      Sleep apnea  Non-compliant with CPAP at home  O2 sat drops while sleeping  -CPAP at night        VTE Risk Mitigation (From admission, onward)         Ordered     heparin (porcine) injection 5,000 Units  Every 8 hours         02/03/22 9397      IP VTE HIGH RISK PATIENT  Once         02/03/22 1734     Place sequential compression device  Until discontinued         02/03/22 1734                Discharge Planning   JACKIE: 2/6/2022     Code Status: Full Code   Is the patient medically ready for discharge?:     Reason for patient still in hospital (select all that apply): Patient trending condition and Consult recommendations                     Nic Mistry MD  Department of Hospital Medicine   [facility] Surg

## 2022-02-06 NOTE — PLAN OF CARE
Patient is AOx 4. On room air. Utilized the CPAP at bedtime. Nightly medication administered per MAR. Suprapubic catheter present, patent/ draining. Turning or repositioning is encouraged.  Telemonitoring is on going and it is SB- SNR. ,SB when sleeping heart rate of42-48 sustained.safety precautions was secured. Will continue to monitor.  Problem: Adult Inpatient Plan of Care  Goal: Absence of Hospital-Acquired Illness or Injury  Outcome: Ongoing, Progressing     Problem: Adult Inpatient Plan of Care  Goal: Plan of Care Review  Outcome: Ongoing, Progressing     Problem: Adult Inpatient Plan of Care  Goal: Patient-Specific Goal (Individualized)  Outcome: Ongoing, Progressing     Problem: Fluid and Electrolyte Imbalance (Acute Kidney Injury/Impairment)  Goal: Fluid and Electrolyte Balance  Outcome: Ongoing, Progressing     Problem: Oral Intake Inadequate (Acute Kidney Injury/Impairment)  Goal: Optimal Nutrition Intake  Outcome: Ongoing, Progressing

## 2022-02-06 NOTE — PLAN OF CARE
.0o    Pt on documented O2; no respiratory distress noted. CPAP at night. Will continue to monitor.

## 2022-02-06 NOTE — PLAN OF CARE
Problem: Physical Therapy Goal  Goal: Physical Therapy Goal  Description: Goals to be met by: 3/4/22     Patient will increase functional independence with mobility by performin. Supine <> sit with supervision  2. Sit to stand transfer with supervision  3. Bed to chair transfer with Supervision using Rolling Walker  4. Gait  x 50 feet with Supervision using Rolling Walker.   5. Lower extremity exercise program x 10 reps per handout, with supervision    Outcome: Ongoing, Progressing   Pt continues to work toward all goals. Able to perform ambulation training ~15-20 ft (around bed and back) with 1 seated rest break needed using RW requiring min A plus verbal and tactile cueing to stay within RW boundaries.

## 2022-02-06 NOTE — SUBJECTIVE & OBJECTIVE
Interval History:  Reports some crampy abdominal pain today.  Low appetite.  Discussed plan with the patient and her daughter at bedside.    Review of Systems   Constitutional: Positive for appetite change. Negative for fever.   Respiratory: Negative for shortness of breath.    Cardiovascular: Negative for chest pain.   Genitourinary: Positive for difficulty urinating.   Neurological: Positive for weakness.   Psychiatric/Behavioral: Positive for sleep disturbance.     Objective:     Vital Signs (Most Recent):  Temp: 97.5 °F (36.4 °C) (02/06/22 1125)  Pulse: 73 (02/06/22 1200)  Resp: 18 (02/06/22 1125)  BP: (!) 119/56 (02/06/22 1125)  SpO2: (!) 94 % (02/06/22 1130) Vital Signs (24h Range):  Temp:  [97.5 °F (36.4 °C)-98.4 °F (36.9 °C)] 97.5 °F (36.4 °C)  Pulse:  [40-73] 73  Resp:  [18-24] 18  SpO2:  [86 %-97 %] 94 %  BP: (102-129)/(53-68) 119/56     Weight: 93.1 kg (205 lb 4 oz)  Body mass index is 35.23 kg/m².    Intake/Output Summary (Last 24 hours) at 2/6/2022 1438  Last data filed at 2/6/2022 1200  Gross per 24 hour   Intake 1369.26 ml   Output 3200 ml   Net -1830.74 ml      Physical Exam  Vitals and nursing note reviewed.   Constitutional:       General: She is not in acute distress.     Appearance: She is ill-appearing.   HENT:      Head: Normocephalic and atraumatic.      Nose: Nose normal.      Mouth/Throat:      Mouth: Mucous membranes are moist.   Eyes:      Pupils: Pupils are equal, round, and reactive to light.   Cardiovascular:      Rate and Rhythm: Regular rhythm. Bradycardia present.      Pulses: Normal pulses.      Heart sounds: Normal heart sounds.   Pulmonary:      Effort: Pulmonary effort is normal.      Breath sounds: Normal breath sounds.   Abdominal:      General: Bowel sounds are normal.      Palpations: Abdomen is soft.      Comments: Suprapubic catheter   Musculoskeletal:         General: Tenderness present. Normal range of motion.      Cervical back: Normal range of motion.      Right lower  leg: Edema present.      Left lower leg: Edema present.   Skin:     General: Skin is warm and dry.   Neurological:      Mental Status: She is alert and oriented to person, place, and time.      Motor: Weakness present.      Comments: R facial droop, chronic per patient   Psychiatric:         Behavior: Behavior normal.         Significant Labs: All pertinent labs within the past 24 hours have been reviewed.    Significant Imaging: I have reviewed all pertinent imaging results/findings within the past 24 hours.

## 2022-02-06 NOTE — ASSESSMENT & PLAN NOTE
Denies SOB, does have chronic BLE  No respiratory distress noted on room air  -chest XRAY with bilateral atelectasis; incentive spirometry  -hold torsemide 2/2 renal failure

## 2022-02-06 NOTE — PT/OT/SLP PROGRESS
"Physical Therapy Treatment    Patient Name:  Tasha Hawley   MRN:  [id number]    Recommendations:     Discharge Recommendations:  home health PT,home health OT,home,home with home health   Discharge Equipment Recommendations: bath bench   Barriers to discharge:  decreased functional mobility    Assessment:     Tasha Hawley is a 66 y.o. female admitted with a medical diagnosis of Acute renal failure.  She presents with the following impairments/functional limitations:  weakness,impaired endurance,impaired self care skills,impaired functional mobilty,gait instability,impaired balance,decreased coordination,decreased upper extremity function,decreased lower extremity function,decreased safety awareness,pain,decreased ROM,impaired coordination,impaired skin,edema,impaired cardiopulmonary response to activity. Pt able to perform ambulation training ~4 -5 steps fwd and~4-5 steps bwd and 2nd trial ~15-20 ft (around bed and back) with 1 seated rest break needed with RW requiring min A plus constant verbal cueing to stay within RW boundaries. Would benefit from continued PT services to increase pt' out of bed therapeutic activity and exercises.    Rehab Prognosis: Good; patient would benefit from acute skilled PT services to address these deficits and reach maximum level of function.    Recent Surgery: * No surgery found *      Plan:     During this hospitalization, patient to be seen 5 x/week to address the identified rehab impairments via gait training,therapeutic activities,therapeutic exercises and progress toward the following goals:    · Plan of Care Expires:  03/04/22    Subjective     Chief Complaint: None Expressed  Patient/Family Comments/goals: "I feel so weak".  Pain/Comfort:  · Pain Rating 1:  (Did not rate)  · Location - Orientation 1: lower  · Location 1: back  · Pain Addressed 1: Reposition,Distraction  · Pain Rating Post-Intervention 1:  (Did not rate)      Objective:     Communicated with nurse prior " to session.  Patient found supine with bed alarm,perineural catheter,telemetry,oxygen upon PT entry to room.     General Precautions: Standard, fall   Orthopedic Precautions:N/A   Braces: N/A  Respiratory Status: Nasal cannula, flow  2 L/min     Functional Mobility:  · Bed Mobility:     · Rolling Right: contact guard assistance and  using bed rail  · Scooting: stand by assistance and  to EOB  · Supine to Sit: contact guard assistance and minimum assistance  · Sit to Supine: contact guard assistance  · Transfers:     · Sit to Stand:  contact guard assistance with rolling walker  · Gait:  ~4-5 steps fwd and ~4-5 steps bwd and 2nd trial ~15-20 ft around bed and back requiring 1 seated rest break all with RW requiring min A plus constant verbal/tactile cueing to stay with RW boundaries.      AM-PAC 6 CLICK MOBILITY  Turning over in bed (including adjusting bedclothes, sheets and blankets)?: 3  Sitting down on and standing up from a chair with arms (e.g., wheelchair, bedside commode, etc.): 3  Moving from lying on back to sitting on the side of the bed?: 3  Moving to and from a bed to a chair (including a wheelchair)?: 3  Need to walk in hospital room?: 3  Climbing 3-5 steps with a railing?: 1  Basic Mobility Total Score: 16       Therapeutic Activities and Exercises:   Pt able to perform 4 sit to stand transfer trials 3 from EOB and 1 from B/S chair (without arm rests) using RW requiring CGA. 1 x 10 BLE's marching in place within RW boundaries. 1 x 10 seated LAQ's BLE's requiring assistance on LLE to ensure full ROM available. Ambulation training by 2 trials ~4-5 steps fwd and ~4-5 steps bwd 2nd trial ~15-20 ft (around bed and back) requiring 1 seated rest break (chair without arm rests) all with RW requiring min A plus constant verbal/tactile cueing to stay within RW boundaries and decrease trunk flexion. Demonstrates a decreased step length, step height, and slow cj.    Patient left supine with all lines intact,  call button in reach,  nurse notified and  daughter present..    GOALS:   Multidisciplinary Problems     Physical Therapy Goals        Problem: Physical Therapy Goal    Goal Priority Disciplines Outcome Goal Variances Interventions   Physical Therapy Goal     PT, PT/OT Ongoing, Progressing     Description: Goals to be met by: 3/4/22     Patient will increase functional independence with mobility by performin. Supine <> sit with supervision  2. Sit to stand transfer with supervision  3. Bed to chair transfer with Supervision using Rolling Walker  4. Gait  x 50 feet with Supervision using Rolling Walker.   5. Lower extremity exercise program x 10 reps per handout, with supervision                     Time Tracking:     PT Received On: 22  PT Start Time: 1155     PT Stop Time: 1230  PT Total Time (min): 35 min     Billable Minutes: Gait Training  20 and Therapeutic Activity  15    Treatment Type: Treatment  PT/PTA: PTA     PTA Visit Number: 1     2022

## 2022-02-06 NOTE — ASSESSMENT & PLAN NOTE
Has dilaudid intrathecal pump, states it doesn't work  -cont home norco dose  -bowel regimen  -monitor closely      · 43 years old male, PMH alcohol abuse, presents to ED with c/o diffused abd pain,  nausea,  vomiting and body shaking for 2 days. Pt states he has a hx of alcohol abuse; drinks 4-5 glasses wine daily and Vodka  sometimes.  His  last drink was 2 days ago. Pt denies headache, dizziness, blurred visions, light sensitivities, focal/distal weakness or numbness, cough, SOB, chest pain, fever, chills, dysuria or irregular bowel movements.	    IMPROVE VTE Individual Risk Assessment          RISK                                                          Points    [  ] Previous VTE                                                3    [  ] Thrombophilia                                             2    [  ] Lower limb paralysis                                    2        (unable to hold up >15 seconds)      [  ] Current Cancer                                             2         (within 6 months)    [  ] Immobilization > 24 hrs                              1    [  ] ICU/CCU stay > 24 hours                            1    [  ] Age > 60                                                    1    IMPROVE VTE Score _______0__    Low risk 0-1: [  ] Early ambulation                          [x  ] Intermittent Compression Device    High Risk 2-12: [  ] Heparin 5,000 units SC Q8 H                              [  ] Heparin 5,000 units SC Q 12 H                              [  ] LMWH 40 mg SC daily (CrCl > 30 ml)

## 2022-02-06 NOTE — PROGRESS NOTES
Nephrology Progress Note     Consult Requested By: Nic Mistry, *  Reason for Consult: CARITO    SUBJECTIVE:      ?    Review of Systems   Constitutional: Positive for malaise/fatigue. Negative for chills and fever.   HENT: Negative for congestion and sore throat.    Eyes: Negative for blurred vision, double vision and photophobia.   Respiratory: Negative for cough and shortness of breath.    Cardiovascular: Negative for chest pain, palpitations and leg swelling.   Gastrointestinal: Negative for abdominal pain, diarrhea, nausea and vomiting.   Genitourinary: Negative for dysuria and urgency.   Musculoskeletal: Negative for joint pain and myalgias.   Skin: Negative for itching and rash.   Neurological: Positive for weakness. Negative for dizziness, sensory change and headaches.   Endo/Heme/Allergies: Negative for polydipsia. Does not bruise/bleed easily.   Psychiatric/Behavioral: Positive for memory loss. Negative for depression. The patient is nervous/anxious and has insomnia.        Past Medical History:   Diagnosis Date    Anticoagulant long-term use     Anxiety     Arthritis     Bilateral lower extremity edema     severe chronic    Carotid artery occlusion     Cataract     CHF (congestive heart failure)     Coronary artery disease     subtotalled LAD with collateral    Depression     Fever blister     Hypothyroid     Iron deficiency anemia     Lumbar radiculopathy     with chronic pain    Ocular migraine     Renal disorder     Sleep apnea     cpap     Past Surgical History:   Procedure Laterality Date    ADENOIDECTOMY      BACK SURGERY      x 12    CARDIAC CATHETERIZATION  2016    subtotalled LAD with right to left collaterals    CATARACT EXTRACTION W/  INTRAOCULAR LENS IMPLANT Left     Dr Coleman     CYSTOSCOPIC LITHOLAPAXY N/A 6/27/2019    Procedure: CYSTOLITHOLAPAXY;  Surgeon: Shireen Mayo MD;  Location: [facility] OR [address] [place] [address];  Service: Urology;  Laterality: N/A;    CYSTOSCOPIC  LITHOLAPAXY N/A 9/3/2019    Procedure: CYSTOLITHOLAPAXY;  Surgeon: Shireen Mayo MD;  Location: [facility] OR 2ND FLR;  Service: Urology;  Laterality: N/A;    CYSTOSCOPY N/A 7/13/2021    Procedure: CYSTOSCOPY;  Surgeon: Shireen Mayo MD;  Location: [facility] OR 1ST FLR;  Service: Urology;  Laterality: N/A;    CYSTOSCOPY  11/16/2021    Procedure: CYSTOSCOPY;  Surgeon: Shireen Mayo MD;  Location: [facility] OR 1ST FLR;  Service: Urology;;    ESOPHAGOGASTRODUODENOSCOPY N/A 5/23/2018    Procedure: ESOPHAGOGASTRODUODENOSCOPY (EGD);  Surgeon: Prince Vance MD;  Location: [facility] (4TH FLR);  Service: Endoscopy;  Laterality: N/A;  r/s 'd per Dr. Vance due to family emergency- ER    HYSTERECTOMY  1975    endometriosis    INJECTION OF BOTULINUM TOXIN TYPE A  7/13/2021    Procedure: INJECTION, BOTULINUM TOXIN, 200units;  Surgeon: Shireen Mayo MD;  Location: [facility] OR 1ST FLR;  Service: Urology;;    INJECTION OF BOTULINUM TOXIN TYPE A  11/16/2021    Procedure: INJECTION, BOTULINUM TOXIN, 200units;  Surgeon: Shireen Mayo MD;  Location: [facility] OR 1ST FLR;  Service: Urology;;    pain pump placement      SQ Dilaudid Pump managed by Dr. Hillman, Pain Management    REPLACEMENT OF CATHETER N/A 10/31/2019    Procedure: REPLACEMENT, CATHETER-SUPRAPUBIC;  Surgeon: Shireen Mayo MD;  [facility];  Service: Urology;  Laterality: N/A;    SPINAL CORD STIMULATOR REMOVAL      before [place]    SPINE SURGERY  5-13-13    CERVICAL FUSION    TONSILLECTOMY       Family History   Problem Relation Age of Onset    Cancer Mother 55        breast    Cancer Father         esophagus,had laryngectomy    Esophageal cancer Father     Parkinsonism Maternal Grandmother     Tremor Maternal Grandmother     No Known Problems Brother     No Known Problems Brother     Heart disease Maternal Uncle     Colon cancer Maternal Uncle         Less than 60    No Known Problems Sister     No Known Problems Maternal Aunt      Cirrhosis Paternal Aunt         ETOH    Liver disease Paternal Aunt         ETOH    Liver disease Paternal Uncle         ETOH    Cirrhosis Paternal Uncle         ETOH    No Known Problems Maternal Grandfather     No Known Problems Paternal Grandmother     No Known Problems Paternal Grandfather     Melanoma Neg Hx     Amblyopia Neg Hx     Blindness Neg Hx     Cataracts Neg Hx     Diabetes Neg Hx     Glaucoma Neg Hx     Hypertension Neg Hx     Macular degeneration Neg Hx     Retinal detachment Neg Hx     Strabismus Neg Hx     Stroke Neg Hx     Thyroid disease Neg Hx     Celiac disease Neg Hx     Colon polyps Neg Hx     Cystic fibrosis Neg Hx     Crohn's disease Neg Hx     Inflammatory bowel disease Neg Hx     Liver cancer Neg Hx     Rectal cancer Neg Hx     Stomach cancer Neg Hx     Ulcerative colitis Neg Hx     Lymphoma Neg Hx      Social History     Tobacco Use    Smoking status: Never Smoker    Smokeless tobacco: Never Used   Substance Use Topics    Alcohol use: Never    Drug use: No       Review of patient's allergies indicates:   Allergen Reactions    (d)-limonene flavor      Other reaction(s): difficult intubation  Other reaction(s): Difficulty breathing    Bactrim [sulfamethoxazole-trimethoprim] Anaphylaxis    Benadryl [diphenhydramine hcl] Shortness Of Breath    Fentanyl Itching, Nausea And Vomiting and Swelling             Imitrex [sumatriptan succinate] Shortness Of Breath    Topamax [topiramate] Shortness Of Breath    Vancomycin Shortness Of Breath     Rash    Butorphanol tartrate     Darvocet a500 [propoxyphene n-acetaminophen]      Other reaction(s): Difficulty breathing    White petrolatum-zinc     Zinc oxide-white petrolatum      Other reaction(s): Difficulty breathing    Latex, natural rubber Itching and Rash    Phenytoin Rash and Other (See Comments)     Trouble breathing            OBJECTIVE:     Vital Signs (Most Recent)  Vitals:    02/06/22 0423  02/06/22 0726 02/06/22 0817 02/06/22 0922   BP:  (!) 102/56     BP Location:       Patient Position:  Lying     Pulse: (!) 42 (!) 48     Resp:  18     Temp:  97.9 °F (36.6 °C)     TempSrc:  Oral     SpO2:  (!) 86% (!) 92% (!) 89%   Weight:       Height:                     Medications:   aspirin  81 mg Oral Daily    atorvastatin  80 mg Oral Daily    ceFEPime (MAXIPIME) IVPB  1 g Intravenous Q24H    fluconazole  100 mg Oral Daily    FLUoxetine  60 mg Oral Daily    heparin (porcine)  5,000 Units Subcutaneous Q8H    lactulose  30 g Oral Once    levothyroxine  125 mcg Oral Before breakfast    mupirocin   Nasal BID    oxybutynin  15 mg Oral Daily    pantoprazole  40 mg Oral Daily    polyethylene glycol  17 g Oral Daily    QUEtiapine  200 mg Oral Nightly    senna  2 tablet Oral BID    sevelamer carbonate  800 mg Oral TID WM    sodium bicarbonate  650 mg Oral TID    traZODone  300 mg Oral QHS           Physical Exam  Vitals and nursing note reviewed.   Constitutional:       General: She is not in acute distress.     Appearance: She is obese. She is ill-appearing. She is not diaphoretic.   HENT:      Head: Normocephalic and atraumatic.      Mouth/Throat:      Pharynx: No oropharyngeal exudate.   Eyes:      General: No scleral icterus.     Conjunctiva/sclera: Conjunctivae normal.      Pupils: Pupils are equal, round, and reactive to light.   Cardiovascular:      Rate and Rhythm: Normal rate and regular rhythm.      Heart sounds: Normal heart sounds. No murmur heard.      Pulmonary:      Effort: Pulmonary effort is normal. No respiratory distress.      Breath sounds: Normal breath sounds.   Abdominal:      General: Bowel sounds are normal. There is no distension.      Palpations: Abdomen is soft.      Tenderness: There is no abdominal tenderness.   Genitourinary:     Comments: Suprapubic catheter   Musculoskeletal:         General: Normal range of motion.      Cervical back: Normal range of motion and neck  supple.   Skin:     General: Skin is warm and dry.      Findings: No erythema.   Neurological:      Mental Status: She is alert and oriented to person, place, and time.      Cranial Nerves: No cranial nerve deficit.   Psychiatric:         Mood and Affect: Affect normal.         Cognition and Memory: Memory normal.         Judgment: Judgment normal.         Laboratory:  Recent Labs   Lab 02/04/22 0437 02/04/22 0437 02/05/22 0308 02/05/22 0527   WBC 4.87  --  5.42 5.61   HGB 11.0*  --  11.2* 11.3*   HCT 32.9*  --  33.7* 34.9*     --  192 175   MONO 11.1  0.5   < > 12.0  0.7 10.9  0.6    < > = values in this interval not displayed.     Recent Labs   Lab 02/04/22 0437 02/04/22 0437 02/04/22  1231 02/05/22  0527 02/06/22  0506   *   < > 129* 133* 136   K 3.8   < > 4.3 4.2 3.9   CL 95   < > 94* 97 97   CO2 23   < > 20* 22* 27   BUN 22   < > 23 24* 24*   CREATININE 7.2*   < > 7.2* 7.4* 7.2*   CALCIUM 7.9*   < > 8.3* 8.0* 8.0*   PHOS 5.3*  --   --  5.8* 5.5*    < > = values in this interval not displayed.       Diagnostic Results:  X-Ray: Reviewed  US: Reviewed  Echo: Reviewed  ASSESSMENT/PLAN:     1. CARITO/Acidosis/Hyponatremia   - as of now ATN due to UTI, no obstruction,  US/CT kidney noncontributory   -- Cr still up 7.2-7.4 no uremia symptoms however hard to assess unknown patient baseline mental and functional.  -- Treat UTI now on both fluconazole and Ceftriaxone   -- Discussed with her and  may need Dialysis both want everything to be done to prolong her life   -- risk and benefits were discussed as well.    -- Stopped  IVF has trace edema good UOP edema  chronic but no improvement ib Cr not pre renal at all.   -- Monitor for now, check CPK     2. HTN (I10) - at range   3. Anemia    Recent Labs   Lab 02/04/22 0437 02/05/22  0308 02/05/22  0527   HGB 11.0* 11.2* 11.3*   HCT 32.9* 33.7* 34.9*    192 175       Iron   Lab Results   Component Value Date    IRON 92 06/10/2021    TIBC 330  06/10/2021    FERRITIN 121 06/10/2021       4. MBD (E88.9 M90.80) -  Recent Labs   Lab 02/06/22  0506   CALCIUM 8.0*   PHOS 5.5*     Recent Labs   Lab 02/04/22  0437 02/05/22  0527 02/06/22  0506   MG 1.8 1.9 1.9   Sevelamer     Lab Results   Component Value Date    CALCIUM 8.0 (L) 02/06/2022    PHOS 5.5 (H) 02/06/2022     Lab Results   Component Value Date    PVDUICGI41OD 18 (L) 10/19/2020   calcitriol     Lab Results   Component Value Date    CO2 27 02/06/2022       5. Acidosis start PO bicarb   6. Nutrition/Hypoalbuminemia (E88.09) -   Recent Labs   Lab 02/03/22  1328   ALBUMIN 3.9     Nepro with meals TID. Renal vitamins daily      Thank you for the consult, will follow  With any question please call [phone]  Fernando Booker MD    [facility]  NIMA Wheeler MD, FACPNIEVES MD,   MD ELIS Gutierrez MD E. V. Harmon, NP    [address]  [place], [address] [phone]

## 2022-02-07 ENCOUNTER — PATIENT OUTREACH (OUTPATIENT)
Dept: ADMINISTRATIVE | Facility: OTHER | Age: 67
End: 2022-02-07
Payer: MEDICARE

## 2022-02-07 DIAGNOSIS — Z09 NEED FOR CASE MANAGEMENT FOLLOW-UP: Primary | ICD-10-CM

## 2022-02-07 LAB
ANION GAP SERPL CALC-SCNC: 17 MMOL/L (ref 8–16)
BUN SERPL-MCNC: 23 MG/DL (ref 8–23)
CALCIUM SERPL-MCNC: 8.1 MG/DL (ref 8.7–10.5)
CHLORIDE SERPL-SCNC: 99 MMOL/L (ref 95–110)
CO2 SERPL-SCNC: 24 MMOL/L (ref 23–29)
CREAT SERPL-MCNC: 6.4 MG/DL (ref 0.5–1.4)
EST. GFR  (AFRICAN AMERICAN): 7 ML/MIN/1.73 M^2
EST. GFR  (NON AFRICAN AMERICAN): 6 ML/MIN/1.73 M^2
GLUCOSE SERPL-MCNC: 81 MG/DL (ref 70–110)
MAGNESIUM SERPL-MCNC: 1.9 MG/DL (ref 1.6–2.6)
PHOSPHATE SERPL-MCNC: 5.3 MG/DL (ref 2.7–4.5)
POTASSIUM SERPL-SCNC: 4.2 MMOL/L (ref 3.5–5.1)
SODIUM SERPL-SCNC: 140 MMOL/L (ref 136–145)

## 2022-02-07 PROCEDURE — 84100 ASSAY OF PHOSPHORUS: CPT | Mod: HCNC | Performed by: HOSPITALIST

## 2022-02-07 PROCEDURE — 80048 BASIC METABOLIC PNL TOTAL CA: CPT | Mod: HCNC | Performed by: HOSPITALIST

## 2022-02-07 PROCEDURE — 21400001 HC TELEMETRY ROOM: Mod: HCNC

## 2022-02-07 PROCEDURE — 97110 THERAPEUTIC EXERCISES: CPT | Mod: HCNC,CQ

## 2022-02-07 PROCEDURE — 63600175 PHARM REV CODE 636 W HCPCS: Mod: HCNC | Performed by: NURSE PRACTITIONER

## 2022-02-07 PROCEDURE — 97116 GAIT TRAINING THERAPY: CPT | Mod: HCNC,CQ

## 2022-02-07 PROCEDURE — 94660 CPAP INITIATION&MGMT: CPT | Mod: HCNC

## 2022-02-07 PROCEDURE — 94799 UNLISTED PULMONARY SVC/PX: CPT | Mod: HCNC

## 2022-02-07 PROCEDURE — 97535 SELF CARE MNGMENT TRAINING: CPT | Mod: HCNC

## 2022-02-07 PROCEDURE — 94761 N-INVAS EAR/PLS OXIMETRY MLT: CPT | Mod: HCNC

## 2022-02-07 PROCEDURE — 63600175 PHARM REV CODE 636 W HCPCS: Mod: HCNC | Performed by: EMERGENCY MEDICINE

## 2022-02-07 PROCEDURE — 99900035 HC TECH TIME PER 15 MIN (STAT): Mod: HCNC

## 2022-02-07 PROCEDURE — 25000003 PHARM REV CODE 250: Mod: HCNC | Performed by: STUDENT IN AN ORGANIZED HEALTH CARE EDUCATION/TRAINING PROGRAM

## 2022-02-07 PROCEDURE — 25000003 PHARM REV CODE 250: Mod: HCNC | Performed by: NURSE PRACTITIONER

## 2022-02-07 PROCEDURE — 83735 ASSAY OF MAGNESIUM: CPT | Mod: HCNC | Performed by: HOSPITALIST

## 2022-02-07 PROCEDURE — 27000221 HC OXYGEN, UP TO 24 HOURS: Mod: HCNC

## 2022-02-07 PROCEDURE — 25000003 PHARM REV CODE 250: Mod: HCNC | Performed by: HOSPITALIST

## 2022-02-07 PROCEDURE — 63700000 PHARM REV CODE 250 ALT 637 W/O HCPCS: Mod: HCNC | Performed by: INTERNAL MEDICINE

## 2022-02-07 PROCEDURE — 36415 COLL VENOUS BLD VENIPUNCTURE: CPT | Mod: HCNC | Performed by: HOSPITALIST

## 2022-02-07 RX ORDER — HYDROCODONE BITARTRATE AND ACETAMINOPHEN 5; 325 MG/1; MG/1
1 TABLET ORAL EVERY 6 HOURS PRN
Status: DISCONTINUED | OUTPATIENT
Start: 2022-02-07 | End: 2022-02-09

## 2022-02-07 RX ADMIN — POLYETHYLENE GLYCOL 3350 17 G: 17 POWDER, FOR SOLUTION ORAL at 09:02

## 2022-02-07 RX ADMIN — STANDARDIZED SENNA CONCENTRATE 2 TABLET: 8.6 TABLET ORAL at 08:02

## 2022-02-07 RX ADMIN — SEVELAMER CARBONATE 800 MG: 800 TABLET, FILM COATED ORAL at 09:02

## 2022-02-07 RX ADMIN — ONDANSETRON 4 MG: 2 INJECTION INTRAMUSCULAR; INTRAVENOUS at 08:02

## 2022-02-07 RX ADMIN — SODIUM BICARBONATE 650 MG: 650 TABLET ORAL at 03:02

## 2022-02-07 RX ADMIN — FLUOXETINE HYDROCHLORIDE 60 MG: 20 CAPSULE ORAL at 09:02

## 2022-02-07 RX ADMIN — TRAZODONE HYDROCHLORIDE 300 MG: 100 TABLET ORAL at 08:02

## 2022-02-07 RX ADMIN — CEFEPIME 1 G: 1 INJECTION, POWDER, FOR SOLUTION INTRAMUSCULAR; INTRAVENOUS at 08:02

## 2022-02-07 RX ADMIN — SEVELAMER CARBONATE 800 MG: 800 TABLET, FILM COATED ORAL at 11:02

## 2022-02-07 RX ADMIN — STANDARDIZED SENNA CONCENTRATE 2 TABLET: 8.6 TABLET ORAL at 09:02

## 2022-02-07 RX ADMIN — HYDROCODONE BITARTRATE AND ACETAMINOPHEN 1 TABLET: 10; 325 TABLET ORAL at 09:02

## 2022-02-07 RX ADMIN — SODIUM BICARBONATE 650 MG: 650 TABLET ORAL at 09:02

## 2022-02-07 RX ADMIN — HYDROCODONE BITARTRATE AND ACETAMINOPHEN 1 TABLET: 5; 325 TABLET ORAL at 09:02

## 2022-02-07 RX ADMIN — PANTOPRAZOLE SODIUM 40 MG: 40 TABLET, DELAYED RELEASE ORAL at 09:02

## 2022-02-07 RX ADMIN — SEVELAMER CARBONATE 800 MG: 800 TABLET, FILM COATED ORAL at 05:02

## 2022-02-07 RX ADMIN — ATORVASTATIN CALCIUM 80 MG: 40 TABLET, FILM COATED ORAL at 09:02

## 2022-02-07 RX ADMIN — HEPARIN SODIUM 5000 UNITS: 5000 INJECTION INTRAVENOUS; SUBCUTANEOUS at 09:02

## 2022-02-07 RX ADMIN — HYDROXYZINE PAMOATE 25 MG: 25 CAPSULE ORAL at 09:02

## 2022-02-07 RX ADMIN — QUETIAPINE FUMARATE 200 MG: 100 TABLET ORAL at 08:02

## 2022-02-07 RX ADMIN — MUPIROCIN: 20 OINTMENT TOPICAL at 08:02

## 2022-02-07 RX ADMIN — MUPIROCIN: 20 OINTMENT TOPICAL at 09:02

## 2022-02-07 RX ADMIN — HEPARIN SODIUM 5000 UNITS: 5000 INJECTION INTRAVENOUS; SUBCUTANEOUS at 03:02

## 2022-02-07 RX ADMIN — LEVOTHYROXINE SODIUM 125 MCG: 0.03 TABLET ORAL at 05:02

## 2022-02-07 RX ADMIN — FLUCONAZOLE 100 MG: 100 TABLET ORAL at 09:02

## 2022-02-07 RX ADMIN — HEPARIN SODIUM 5000 UNITS: 5000 INJECTION INTRAVENOUS; SUBCUTANEOUS at 05:02

## 2022-02-07 RX ADMIN — OXYBUTYNIN CHLORIDE 15 MG: 5 TABLET, EXTENDED RELEASE ORAL at 09:02

## 2022-02-07 RX ADMIN — ASPIRIN 81 MG: 81 TABLET, COATED ORAL at 09:02

## 2022-02-07 NOTE — PLAN OF CARE
Problem: Physical Therapy Goal  Goal: Physical Therapy Goal  Description: Goals to be met by: 3/4/22     Patient will increase functional independence with mobility by performin. Supine <> sit with supervision  2. Sit to stand transfer with supervision  3. Bed to chair transfer with Supervision using Rolling Walker  4. Gait  x 50 feet with Supervision using Rolling Walker.   5. Lower extremity exercise program x 10 reps per handout, with supervision    Outcome: Ongoing, Progressing

## 2022-02-07 NOTE — PLAN OF CARE
Problem: Occupational Therapy Goal  Goal: Occupational Therapy Goal  Description: Goals to be met by: 3/4/22     Patient will increase functional independence with ADLs by performing:    LE Dressing with Minimal Assistance.  Grooming while standing with Supervision.  Toileting from toilet with Supervision for hygiene and clothing management.   Supine to sit with Supervision.  Step transfer with Supervision  Toilet transfer to toilet with Supervision.  Increased functional strength to WFL for self care skills and functional mobility.  Upper extremity exercise program x10 reps per handout, with independence.    Outcome: Ongoing, Progressing       Pt progressing towards goals. Remains with impaired endurance and pain limiting ax tolerance. Cont OT POC

## 2022-02-07 NOTE — SUBJECTIVE & OBJECTIVE
Interval History:  Still with some abdominal discomfort today.  States that she just feels funny.  Renal function improving slightly today.    Review of Systems   Constitutional: Positive for appetite change. Negative for fever.   Respiratory: Negative for shortness of breath.    Cardiovascular: Negative for chest pain.   Genitourinary: Positive for difficulty urinating.   Neurological: Positive for weakness.   Psychiatric/Behavioral: Positive for sleep disturbance.     Objective:     Vital Signs (Most Recent):  Temp: 97.4 °F (36.3 °C) (02/07/22 1207)  Pulse: (!) 45 (02/07/22 1208)  Resp: 18 (02/07/22 1207)  BP: (!) 106/58 (02/07/22 1207)  SpO2: 97 % (02/07/22 1207) Vital Signs (24h Range):  Temp:  [97.4 °F (36.3 °C)-98.7 °F (37.1 °C)] 97.4 °F (36.3 °C)  Pulse:  [41-84] 45  Resp:  [12-20] 18  SpO2:  [91 %-99 %] 97 %  BP: ()/(53-63) 106/58     Weight: 93.5 kg (206 lb 2.1 oz)  Body mass index is 35.38 kg/m².    Intake/Output Summary (Last 24 hours) at 2/7/2022 1503  Last data filed at 2/7/2022 1200  Gross per 24 hour   Intake --   Output 5000 ml   Net -5000 ml      Physical Exam  Vitals and nursing note reviewed.   Constitutional:       General: She is not in acute distress.     Appearance: She is ill-appearing.   HENT:      Head: Normocephalic and atraumatic.      Nose: Nose normal.      Mouth/Throat:      Mouth: Mucous membranes are moist.   Eyes:      Pupils: Pupils are equal, round, and reactive to light.   Cardiovascular:      Rate and Rhythm: Regular rhythm. Bradycardia present.      Pulses: Normal pulses.      Heart sounds: Normal heart sounds.   Pulmonary:      Effort: Pulmonary effort is normal.      Breath sounds: Normal breath sounds.   Abdominal:      General: Bowel sounds are normal.      Palpations: Abdomen is soft.      Comments: Suprapubic catheter   Musculoskeletal:         General: Tenderness present. Normal range of motion.      Cervical back: Normal range of motion.      Right lower leg:  Edema present.      Left lower leg: Edema present.   Skin:     General: Skin is warm and dry.   Neurological:      Mental Status: She is alert and oriented to person, place, and time.      Motor: Weakness present.      Comments: R facial droop, chronic per patient   Psychiatric:         Behavior: Behavior normal.         Significant Labs: All pertinent labs within the past 24 hours have been reviewed.    Significant Imaging: I have reviewed all pertinent imaging results/findings within the past 24 hours.

## 2022-02-07 NOTE — PT/OT/SLP PROGRESS
Occupational Therapy   Treatment    Name: Tasha Hawley  MRN: [id number]  Admitting Diagnosis:  Acute renal failure with tubular necrosis       Recommendations:     Discharge Recommendations: home health OT,home health PT  Discharge Equipment Recommendations:  bath bench  Barriers to discharge:  None    Assessment:     Tasha Hawley is a 66 y.o. female with a medical diagnosis of Acute renal failure with tubular necrosis.  She presents with The primary encounter diagnosis was CARITO (acute kidney injury). A diagnosis of Chest pain was also pertinent to this visit.  . Performance deficits affecting function are weakness,gait instability,impaired balance,impaired endurance,decreased lower extremity function,decreased upper extremity function,decreased ROM,impaired coordination,impaired sensation,decreased safety awareness,impaired cognition,impaired self care skills,pain,impaired skin,edema,decreased coordination,impaired functional mobilty.     Pt progressing towards goals. Remains with impaired endurance and pain limiting ax tolerance. Cont OT POC    Rehab Prognosis:  Good; patient would benefit from acute skilled OT services to address these deficits and reach maximum level of function.       Plan:     Patient to be seen 3 x/week to address the above listed problems via self-care/home management,therapeutic activities,therapeutic exercises  · Plan of Care Expires: 03/04/22  · Plan of Care Reviewed with: patient    Subjective     Pain/Comfort:  · Pain Rating 1:  (did not rate)  · Location - Orientation 1: lower  · Location 1: back  · Pain Addressed 1: Reposition,Distraction  · Pain Rating Post-Intervention 1:  (did not rate)    Objective:     Communicated with: nssamir prior to session.    General Precautions: Standard, fall   Orthopedic Precautions:N/A   Braces: N/A       Occupational Performance:     Bed Mobility:    · Pt found UIC     Functional Mobility/Transfers:  · Patient completed Sit <> Stand Transfer with  contact guard assistance and minimum assistance  with  rolling walker   · Functional Mobility: CGA/Min A With RW     Activities of Daily Living:  · Grooming: contact guard assistance standing at sink       AMPA 6 Click ADL: 16    Treatment & Education:  Pt found UIC; 3 stands performed with CGA/Maureen   Pt demonstrating some confusion this date; difficult to understand at times 2/2 mumbled speech   Functional mobility performed in room x 2 trials per pt request; requires maximal verbal cues for safety with RW, poor proximity to RW, significantly forward flexed and feet outside of boundaries of RW; lets go of RW   Stood at sink for G&H Axs increased time; B UE elbow propping on sink       Patient left up in chair with all lines intact, call button in reach and nsg notifiedEducation:      GOALS:   Multidisciplinary Problems     Occupational Therapy Goals        Problem: Occupational Therapy Goal    Goal Priority Disciplines Outcome Interventions   Occupational Therapy Goal     OT, PT/OT Ongoing, Progressing    Description: Goals to be met by: 3/4/22     Patient will increase functional independence with ADLs by performing:    LE Dressing with Minimal Assistance.  Grooming while standing with Supervision.  Toileting from toilet with Supervision for hygiene and clothing management.   Supine to sit with Supervision.  Step transfer with Supervision  Toilet transfer to toilet with Supervision.  Increased functional strength to WFL for self care skills and functional mobility.  Upper extremity exercise program x10 reps per handout, with independence.                     Time Tracking:     OT Date of Treatment: 02/07/22  OT Start Time: 1137  OT Stop Time: 1156  OT Total Time (min): 19 min    Billable Minutes:Self Care/Home Management 19    OT/SAMANTHA: OT     SAMANTHA Visit Number: 0    2/7/2022

## 2022-02-07 NOTE — PT/OT/SLP PROGRESS
Physical Therapy Treatment    Patient Name:  Tasha Hawley   MRN:  [id number]    Recommendations:     Discharge Recommendations:  home health PT   Discharge Equipment Recommendations: bath bench   Barriers to discharge: decreased mobilty,strength and endurance    Assessment:     Tasha Hawley is a 66 y.o. female admitted with a medical diagnosis of Acute renal failure with tubular necrosis.  She presents with the following impairments/functional limitations:  weakness,impaired endurance,impaired functional mobilty,gait instability,impaired balance,decreased lower extremity function,pain,decreased ROM,impaired coordination,impaired cardiopulmonary response to activity,pt with poor endurance and chronic low back pain,pt will benefit from  services upon discharge.    Rehab Prognosis: Fair; patient would benefit from acute skilled PT services to address these deficits and reach maximum level of function.    Recent Surgery: * No surgery found *      Plan:     During this hospitalization, patient to be seen 5 x/week to address the identified rehab impairments via gait training,therapeutic activities,therapeutic exercises,neuromuscular re-education and progress toward the following goals:    · Plan of Care Expires:  03/04/22    Subjective     Chief Complaint: n/a  Patient/Family Comments/goals: pt agreeable to up in chair.  Pain/Comfort:  · Pain Rating 1:  (no rating)  · Location - Orientation 1: lower  · Location 1: back (chronic pain)      Objective:     Communicated with nsg prior to session.  Patient found supine with bed alarm,oxygen,perineural catheter,telemetry upon PT entry to room.     General Precautions: Standard, fall   Orthopedic Precautions:N/A   Braces: N/A  Respiratory Status: Nasal cannula, flow 2 L/min     Functional Mobility:  · Bed Mobility:     · Supine to Sit: stand by assistance  · Transfers:     · Sit to Stand:  stand by assistance with rolling walker  · Bed to Chair: minimum assistance with   rolling walker  using  ambulation  · Gait: amb ~18' with RW and Min A with O2 donned  · Balance: fair standing balance with RW      AM-PAC 6 CLICK MOBILITY  Turning over in bed (including adjusting bedclothes, sheets and blankets)?: 3  Sitting down on and standing up from a chair with arms (e.g., wheelchair, bedside commode, etc.): 3  Moving from lying on back to sitting on the side of the bed?: 3  Moving to and from a bed to a chair (including a wheelchair)?: 3  Need to walk in hospital room?: 3  Climbing 3-5 steps with a railing?: 1  Basic Mobility Total Score: 16       Therapeutic Activities and Exercises: le supine ex's x 5-7 reps inc: ap,qs and hs with rest periods.       Patient left up in chair with all lines intact, call button in reach and pct notified..    GOALS: see general POC  Multidisciplinary Problems     Physical Therapy Goals        Problem: Physical Therapy Goal    Goal Priority Disciplines Outcome Goal Variances Interventions   Physical Therapy Goal     PT, PT/OT Ongoing, Progressing     Description: Goals to be met by: 3/4/22     Patient will increase functional independence with mobility by performin. Supine <> sit with supervision  2. Sit to stand transfer with supervision  3. Bed to chair transfer with Supervision using Rolling Walker  4. Gait  x 50 feet with Supervision using Rolling Walker.   5. Lower extremity exercise program x 10 reps per handout, with supervision                     Time Tracking:     PT Received On: 22  PT Start Time: 913     PT Stop Time: 938  PT Total Time (min): 25 min     Billable Minutes: Gait Training 15 and Therapeutic Exercise 10    Treatment Type: Treatment  PT/PTA: PTA     PTA Visit Number: 2     2022

## 2022-02-07 NOTE — NURSING
"Pt. very lethargic and disoriented to place, time and situation compared to AM assesment. When speaking with , Min, Pt. Was unable to state her . Family is at the bedside and worried because they stated this is not her normal and she is falling asleep mid convo at times. Pt. states she feels, "weird" and "very sleepy." Dr. Mistry notified. No new orders.       "

## 2022-02-07 NOTE — PROGRESS NOTES
[facility] Medicine  Progress Note    Patient Name: Tasha Hawley  MRN: [id number]  Patient Class: IP- Inpatient   Admission Date: 2/3/2022  Length of Stay: 4 days  Attending Physician: Nic Mistry, *  Primary Care Provider: Mesfin Hodges Ii, MD        Subjective:     Principal Problem:Acute renal failure with tubular necrosis        HPI:  Tasha Hawley is a 65 yo female with a pmh of neurogenic bladder, suprapubic catheter,recurrent UTI, chronic pain with epidural pain pump, CAD, hypothyroidism, sleep apnea. She c/o malaise, poor PO intake, N/V, chills, decreased urine output, weakness, chronic pain. She was seen by ID today and noted to have cloudy urine. She just finished a course of cipro and has recurrent UTIs. She states she has not been feeling well for a few weeks. She feels as though she is leaking urine from her urethra. She has home health with [facility] and states they changed her catheter 2 days ago (no record on chart) and she is not sure if a urine sample was obtained at that time. She has chronic swelling to her lower legs and walks with a walker. She fell a few days ago with no injury.       Overview/Hospital Course:  No notes on file    Interval History:  Still with some abdominal discomfort today.  States that she just feels funny.  Renal function improving slightly today.    Review of Systems   Constitutional: Positive for appetite change. Negative for fever.   Respiratory: Negative for shortness of breath.    Cardiovascular: Negative for chest pain.   Genitourinary: Positive for difficulty urinating.   Neurological: Positive for weakness.   Psychiatric/Behavioral: Positive for sleep disturbance.     Objective:     Vital Signs (Most Recent):  Temp: 97.4 °F (36.3 °C) (02/07/22 1207)  Pulse: (!) 45 (02/07/22 1208)  Resp: 18 (02/07/22 1207)  BP: (!) 106/58 (02/07/22 1207)  SpO2: 97 % (02/07/22 1207) Vital Signs (24h Range):  Temp:  [97.4 °F (36.3 °C)-98.7 °F (37.1 °C)]  97.4 °F (36.3 °C)  Pulse:  [41-84] 45  Resp:  [12-20] 18  SpO2:  [91 %-99 %] 97 %  BP: ()/(53-63) 106/58     Weight: 93.5 kg (206 lb 2.1 oz)  Body mass index is 35.38 kg/m².    Intake/Output Summary (Last 24 hours) at 2/7/2022 1503  Last data filed at 2/7/2022 1200  Gross per 24 hour   Intake --   Output 5000 ml   Net -5000 ml      Physical Exam  Vitals and nursing note reviewed.   Constitutional:       General: She is not in acute distress.     Appearance: She is ill-appearing.   HENT:      Head: Normocephalic and atraumatic.      Nose: Nose normal.      Mouth/Throat:      Mouth: Mucous membranes are moist.   Eyes:      Pupils: Pupils are equal, round, and reactive to light.   Cardiovascular:      Rate and Rhythm: Regular rhythm. Bradycardia present.      Pulses: Normal pulses.      Heart sounds: Normal heart sounds.   Pulmonary:      Effort: Pulmonary effort is normal.      Breath sounds: Normal breath sounds.   Abdominal:      General: Bowel sounds are normal.      Palpations: Abdomen is soft.      Comments: Suprapubic catheter   Musculoskeletal:         General: Tenderness present. Normal range of motion.      Cervical back: Normal range of motion.      Right lower leg: Edema present.      Left lower leg: Edema present.   Skin:     General: Skin is warm and dry.   Neurological:      Mental Status: She is alert and oriented to person, place, and time.      Motor: Weakness present.      Comments: R facial droop, chronic per patient   Psychiatric:         Behavior: Behavior normal.         Significant Labs: All pertinent labs within the past 24 hours have been reviewed.    Significant Imaging: I have reviewed all pertinent imaging results/findings within the past 24 hours.      Assessment/Plan:      * Acute renal failure with tubular necrosis  BUN 22. Creatinine 7.2, baseline 1.2  Reports poor PO intake, N/V, decreased urine output  Catheter draining  Given 2.5L IV fluids in ED  -initially treated with IV  fluids without improvement; will stop  -consult nephrology for eval  -hold torsemide  -renally dose meds  -repeat BMP daily  -improving today    Abdominal pain  - hyoscyamine prn added, did not use dose yet; try today  - bowel regimen      Anxiety  - hydroxyzine prn      Hyponatremia  Na 129, baseline 139  In setting of CARITO and poor PO intake  -given 2.5L LR in ED  -cont LR   -monitor labs  -hold torsemide  -nephrology consulted    Bilateral lower extremity edema  Chronic  Hold torsemide for renal failure      Urinary tract infection associated with cystostomy catheter  Recurrent UTI, completed course of cipro 2 days ago  Followed by ID and urology outpatient  Dark, cloudy urine noted. Patient c/o malaise, chills, N/V, weakness, decreased PO intake  Given a dose of rocephin in ED  -switched to cefepime given recent urine culture sensitivities on 1/21  -urine and blood cx pending; also with candida in urine which may be colonization, on fluconazole for now per Nephro  -consult urology for catheter change and eval, performed on 2/4          Chronic diastolic heart failure  Denies SOB, does have chronic BLE  No respiratory distress noted on room air  -chest XRAY with bilateral atelectasis; incentive spirometry  -hold torsemide 2/2 renal failure      Bradycardia  Chronic  EKG not in chart  HR 40s-50s, asymptomatic  Cardiac monitoring      Primary hypothyroidism  Cont synthroid      Frequent falls  Consult PT/OT for eval: recommend HH      Neurogenic bladder  Has chronic indwelling meadows  -cont ditropan  -discontinue zanaflex        Chronic pain syndrome  Has dilaudid intrathecal pump, states it doesn't work  -cont home norco dose  -bowel regimen  -monitor closely       Major depressive disorder, recurrent, mild  Cont prozac      Sleep apnea  Non-compliant with CPAP at home  O2 sat drops while sleeping  -CPAP at night        VTE Risk Mitigation (From admission, onward)         Ordered     heparin (porcine) injection  5,000 Units  Every 8 hours         02/03/22 1734     IP VTE HIGH RISK PATIENT  Once         02/03/22 1734     Place sequential compression device  Until discontinued         02/03/22 1734                Discharge Planning   JACKIE: 2/8/2022     Code Status: Full Code   Is the patient medically ready for discharge?:     Reason for patient still in hospital (select all that apply): Patient trending condition and Consult recommendations                     Nic Mistry MD  Department of Hospital Medicine   [facility]

## 2022-02-07 NOTE — PROGRESS NOTES
02/07/2022 @ 3:04PM- RSW attempted to meet with patient to complete SDOH and liaison assessment, RSW unable to complete visit due to medical staff present in patient room. RSW will follow up with patient at a later time.    02/08/2022 @ 3:11 PM- RSW attempted to contact pt via room phone to complete SDOH questionnaire and liaison assessment. RSW received no answer at this time. RSW will follow up with patient at a later time to complete initial visit assessment.    02/10/2022 @ 2:02 PM- RSW attempted to contact pt via room phone to complete SDOH questionnaire and liaison assessment. RSW received no answer at this time. RSW will follow up with patient at a later time to complete initial visit assessment.    02/10/2022 @ 3:36 PM- RSW attempted to contact pt via room phone to complete SDOH questionnaire and liaison assessment. RSW received no answer at this time. RSW will follow up with patient at a later time to complete initial visit assessment.      IP Liaison - Initial Visit Note    Patient: Tasha Hawley  MRN:  [id number]  Date of Service:  2/14/2022  Completed by:  ML Weir    Reason for Visit   Patient presents with    IP Liaison Initial Visit       RSW met with patient at bedside in order to complete SDOH questionnaire and liaison assessment.  Pt has identified no immediate social barriers to care. Per pt, pt is not in need of resources at this time. Due to pt eligibility, RSW referred pt to [facility] ([facility]).    The following were addressed during this visit:  - Review SDOH Questions   - Complete patient assessment   - Complete initial visit with patient   - Refer patient to [facility] ([facility])       Patient Summary     IP Liaison Patient Assessment    General  Level of Caregiver support: Assistance needed but no caregiver available  Have you had to make a decision between paying for any of the following in the last 2 months?: None  Transportation means:  Family  Employment status: Disabled  Assessments  Was the PHQ Depression Screening completed this visit?: No  Was the GONZALO-7 Screening completed this visit?: No         ML Weir

## 2022-02-07 NOTE — PLAN OF CARE
Time of DCA 3:15pm-    TN met with pt and pt's family at bedside (brother Anson and his spouse) for discharge planning. Pt lethargic and disoriented at time of assessment. Patient lives with spouse who is her help at home. Pt has a suprapubic cath and requires assistance with ADLs. Pt has rollator, BSC, bath bench, and CPAP at home. Pt is current with [facility]. Pt's spouse takes patient to her doctor's appointments and will provide transportation home.    TN informed pt's nurse regarding pt's family concerns with pt's mental status.    Future Appointments   Date Time Provider Department Center   2/8/2022  9:00 AM Mesfin Hodges II, MD [facility] IM [facility]   3/22/2022  3:00 PM Juan A Madera MD [facility]        02/07/22 1610   Discharge Assessment   Assessment Type Discharge Planning Assessment   Confirmed/corrected address, phone number and insurance Yes   Confirmed Demographics Correct on Facesheet   Source of Information family   Communicated JACKIE with patient/caregiver Yes   Reason For Admission Acute renal failure with tubular necrosis   Lives With spouse   Do you expect to return to your current living situation? Yes   Do you have help at home or someone to help you manage your care at home? Yes   Who are your caregiver(s) and their phone number(s)? Dangelo Hawley (Spouse)   [phone] (Mobile)   Prior to hospitilization cognitive status: Unable to Assess   Current cognitive status: Not Oriented to Time   Walking or Climbing Stairs Difficulty ambulation difficulty, requires equipment   Mobility Management Rollator   Dressing/Bathing Difficulty bathing difficulty, requires equipment   Dressing/Bathing Management bath bench   Equipment Currently Used at Home bedside commode;bath bench;rollator;CPAP   Readmission within 30 days? No   Patient currently being followed by outpatient case management? No   Do you currently have service(s) that help you manage your care at home? Yes    Name and Contact number of agency [facility]   Is the pt/caregiver preference to resume services with current agency Yes   Do you take prescription medications? Yes   Do you have prescription coverage? Yes   Coverage [employer] Managed medicare   Do you have any problems affording any of your prescribed medications? Yes   If yes, what medications? patient is receiving financial assistance   Is the patient taking medications as prescribed? yes   Who is going to help you get home at discharge? Dangelo Hawley (Spouse)   [phone] (Mobile)   How do you get to doctors appointments? family or friend will provide   Are you on dialysis? No   Do you take coumadin? No   Discharge Plan A Home Health   Discharge Plan B Skilled Nursing Facility   DME Needed Upon Discharge  none   Discharge Plan discussed with: Patient;Sibling   Relationship/Environment   Name(s) of Who Lives With Patient Dangelo Hawley (Spouse)   [phone] (Mobile)

## 2022-02-07 NOTE — PLAN OF CARE
Patient is awake. Moments of lethargy and disorientation to place, time, and situation. MD aware. Family at bedside. 2L NC. Suprapubic catheter present, patent/draining. Output charted. Tele monitored, Pt. Bradycardic with HR remaining in in mid 40's-50s. Bed alarm on and call light in reach, Pt. Instructed to call for assistance. Will continue to monitor.    Problem: Adult Inpatient Plan of Care  Goal: Plan of Care Review  Outcome: Ongoing, Progressing  Goal: Patient-Specific Goal (Individualized)  Outcome: Ongoing, Progressing  Goal: Absence of Hospital-Acquired Illness or Injury  Outcome: Ongoing, Progressing  Goal: Optimal Comfort and Wellbeing  Outcome: Ongoing, Progressing  Goal: Readiness for Transition of Care  Outcome: Ongoing, Progressing     Problem: Fluid and Electrolyte Imbalance (Acute Kidney Injury/Impairment)  Goal: Fluid and Electrolyte Balance  Outcome: Ongoing, Progressing     Problem: Oral Intake Inadequate (Acute Kidney Injury/Impairment)  Goal: Optimal Nutrition Intake  Outcome: Ongoing, Progressing     Problem: Renal Function Impairment (Acute Kidney Injury/Impairment)  Goal: Effective Renal Function  Outcome: Ongoing, Progressing     Problem: Fluid Imbalance (Pneumonia)  Goal: Fluid Balance  Outcome: Ongoing, Progressing     Problem: Infection (Pneumonia)  Goal: Resolution of Infection Signs and Symptoms  Outcome: Ongoing, Progressing     Problem: Respiratory Compromise (Pneumonia)  Goal: Effective Oxygenation and Ventilation  Outcome: Ongoing, Progressing     Problem: Obstructive Sleep Apnea Risk or Actual Comorbidity Management  Goal: Unobstructed Breathing During Sleep  Outcome: Ongoing, Progressing     Problem: Urinary Retention  Goal: Effective Urinary Elimination  Outcome: Ongoing, Progressing     Problem: Behavioral Health Comorbidity  Goal: Maintenance of Behavioral Health Symptom Control  Outcome: Ongoing, Progressing     Problem: Pain Chronic (Persistent) (Comorbidity  Management)  Goal: Acceptable Pain Control and Functional Ability  Outcome: Ongoing, Progressing     Problem: Heart Failure Comorbidity  Goal: Maintenance of Heart Failure Symptom Control  Outcome: Ongoing, Progressing

## 2022-02-07 NOTE — PLAN OF CARE
Patient aaox4 . On O2 supplementation via NC 2 Lpm. Utilized the CPAP at bedtime. Motley catheter remain patent and intact. Pt ambulated to the bathroom with two person assist she is unstable when she walks. Had a small bowel movement.  Nightly and PRN medication administered per MAR. Safety precaution was secured. Will continue to monitor.    Problem: Adult Inpatient Plan of Care  Goal: Plan of Care Review  Outcome: Ongoing, Progressing     Problem: Urinary Retention  Goal: Effective Urinary Elimination  Outcome: Ongoing, Progressing

## 2022-02-07 NOTE — ASSESSMENT & PLAN NOTE
BUN 22. Creatinine 7.2, baseline 1.2  Reports poor PO intake, N/V, decreased urine output  Catheter draining  Given 2.5L IV fluids in ED  -initially treated with IV fluids without improvement; will stop  -consult nephrology for eval  -hold torsemide  -renally dose meds  -repeat BMP daily  -improving today

## 2022-02-07 NOTE — PROGRESS NOTES
Nephrology Progress Note     Consult Requested By: Nic Mistry, *  Reason for Consult: CARITO    SUBJECTIVE:      ?    Review of Systems   Constitutional: Positive for malaise/fatigue. Negative for chills and fever.   HENT: Negative for congestion and sore throat.    Eyes: Negative for blurred vision, double vision and photophobia.   Respiratory: Negative for cough and shortness of breath.    Cardiovascular: Negative for chest pain, palpitations and leg swelling.   Gastrointestinal: Negative for abdominal pain, diarrhea, nausea and vomiting.   Genitourinary: Negative for dysuria and urgency.   Musculoskeletal: Negative for joint pain and myalgias.   Skin: Negative for itching and rash.   Neurological: Positive for weakness. Negative for dizziness, sensory change and headaches.   Endo/Heme/Allergies: Negative for polydipsia. Does not bruise/bleed easily.   Psychiatric/Behavioral: Positive for memory loss. Negative for depression. The patient is nervous/anxious and has insomnia.        Past Medical History:   Diagnosis Date    Anticoagulant long-term use     Anxiety     Arthritis     Bilateral lower extremity edema     severe chronic    Carotid artery occlusion     Cataract     CHF (congestive heart failure)     Coronary artery disease     subtotalled LAD with collateral    Depression     Fever blister     Hypothyroid     Iron deficiency anemia     Lumbar radiculopathy     with chronic pain    Ocular migraine     Renal disorder     Sleep apnea     cpap     Past Surgical History:   Procedure Laterality Date    ADENOIDECTOMY      BACK SURGERY      x 12    CARDIAC CATHETERIZATION  2016    subtotalled LAD with right to left collaterals    CATARACT EXTRACTION W/  INTRAOCULAR LENS IMPLANT Left     Dr Coleman     CYSTOSCOPIC LITHOLAPAXY N/A 6/27/2019    Procedure: CYSTOLITHOLAPAXY;  Surgeon: Shireen Mayo MD;  Location: [facility] OR [address] [place] [address];  Service: Urology;  Laterality: N/A;    CYSTOSCOPIC  LITHOLAPAXY N/A 9/3/2019    Procedure: CYSTOLITHOLAPAXY;  Surgeon: Shireen Mayo MD;  Location: [facility] OR 2ND FLR;  Service: Urology;  Laterality: N/A;    CYSTOSCOPY N/A 7/13/2021    Procedure: CYSTOSCOPY;  Surgeon: Shireen Mayo MD;  Location: [facility] OR 1ST FLR;  Service: Urology;  Laterality: N/A;    CYSTOSCOPY  11/16/2021    Procedure: CYSTOSCOPY;  Surgeon: Shireen Mayo MD;  Location: [facility] OR 1ST FLR;  Service: Urology;;    ESOPHAGOGASTRODUODENOSCOPY N/A 5/23/2018    Procedure: ESOPHAGOGASTRODUODENOSCOPY (EGD);  Surgeon: Prince Vance MD;  Location: [facility] (4TH FLR);  Service: Endoscopy;  Laterality: N/A;  r/s 'd per Dr. Vance due to family emergency- ER    HYSTERECTOMY  1975    endometriosis    INJECTION OF BOTULINUM TOXIN TYPE A  7/13/2021    Procedure: INJECTION, BOTULINUM TOXIN, 200units;  Surgeon: Shireen Mayo MD;  Location: [facility] OR 1ST FLR;  Service: Urology;;    INJECTION OF BOTULINUM TOXIN TYPE A  11/16/2021    Procedure: INJECTION, BOTULINUM TOXIN, 200units;  Surgeon: Shireen Mayo MD;  Location: [facility] OR 1ST FLR;  Service: Urology;;    pain pump placement      SQ Dilaudid Pump managed by Dr. Hillman, Pain Management    REPLACEMENT OF CATHETER N/A 10/31/2019    Procedure: REPLACEMENT, CATHETER-SUPRAPUBIC;  Surgeon: Shireen Mayo MD;  [facility];  Service: Urology;  Laterality: N/A;    SPINAL CORD STIMULATOR REMOVAL      before [place]    SPINE SURGERY  5-13-13    CERVICAL FUSION    TONSILLECTOMY       Family History   Problem Relation Age of Onset    Cancer Mother 55        breast    Cancer Father         esophagus,had laryngectomy    Esophageal cancer Father     Parkinsonism Maternal Grandmother     Tremor Maternal Grandmother     No Known Problems Brother     No Known Problems Brother     Heart disease Maternal Uncle     Colon cancer Maternal Uncle         Less than 60    No Known Problems Sister     No Known Problems Maternal Aunt      Cirrhosis Paternal Aunt         ETOH    Liver disease Paternal Aunt         ETOH    Liver disease Paternal Uncle         ETOH    Cirrhosis Paternal Uncle         ETOH    No Known Problems Maternal Grandfather     No Known Problems Paternal Grandmother     No Known Problems Paternal Grandfather     Melanoma Neg Hx     Amblyopia Neg Hx     Blindness Neg Hx     Cataracts Neg Hx     Diabetes Neg Hx     Glaucoma Neg Hx     Hypertension Neg Hx     Macular degeneration Neg Hx     Retinal detachment Neg Hx     Strabismus Neg Hx     Stroke Neg Hx     Thyroid disease Neg Hx     Celiac disease Neg Hx     Colon polyps Neg Hx     Cystic fibrosis Neg Hx     Crohn's disease Neg Hx     Inflammatory bowel disease Neg Hx     Liver cancer Neg Hx     Rectal cancer Neg Hx     Stomach cancer Neg Hx     Ulcerative colitis Neg Hx     Lymphoma Neg Hx      Social History     Tobacco Use    Smoking status: Never Smoker    Smokeless tobacco: Never Used   Substance Use Topics    Alcohol use: Never    Drug use: No       Review of patient's allergies indicates:   Allergen Reactions    (d)-limonene flavor      Other reaction(s): difficult intubation  Other reaction(s): Difficulty breathing    Bactrim [sulfamethoxazole-trimethoprim] Anaphylaxis    Benadryl [diphenhydramine hcl] Shortness Of Breath    Fentanyl Itching, Nausea And Vomiting and Swelling             Imitrex [sumatriptan succinate] Shortness Of Breath    Topamax [topiramate] Shortness Of Breath    Vancomycin Shortness Of Breath     Rash    Butorphanol tartrate     Darvocet a500 [propoxyphene n-acetaminophen]      Other reaction(s): Difficulty breathing    White petrolatum-zinc     Zinc oxide-white petrolatum      Other reaction(s): Difficulty breathing    Latex, natural rubber Itching and Rash    Phenytoin Rash and Other (See Comments)     Trouble breathing            OBJECTIVE:     Vital Signs (Most Recent)  Vitals:    02/07/22 0722  02/07/22 0728 02/07/22 0751 02/07/22 0914   BP:  (!) 111/56     BP Location:       Patient Position:  Lying     Pulse:  (!) 48 (!) 41    Resp:  18  17   Temp:  97.6 °F (36.4 °C)     TempSrc:  Oral     SpO2: 95% 95%     Weight:       Height:                     Medications:   aspirin  81 mg Oral Daily    atorvastatin  80 mg Oral Daily    ceFEPime (MAXIPIME) IVPB  1 g Intravenous Q24H    fluconazole  100 mg Oral Daily    FLUoxetine  60 mg Oral Daily    heparin (porcine)  5,000 Units Subcutaneous Q8H    levothyroxine  125 mcg Oral Before breakfast    mupirocin   Nasal BID    oxybutynin  15 mg Oral Daily    pantoprazole  40 mg Oral Daily    polyethylene glycol  17 g Oral Daily    QUEtiapine  200 mg Oral Nightly    senna  2 tablet Oral BID    sevelamer carbonate  800 mg Oral TID WM    sodium bicarbonate  650 mg Oral TID    traZODone  300 mg Oral QHS           Physical Exam  Vitals and nursing note reviewed.   Constitutional:       General: She is not in acute distress.     Appearance: She is obese. She is ill-appearing. She is not diaphoretic.   HENT:      Head: Normocephalic and atraumatic.      Mouth/Throat:      Pharynx: No oropharyngeal exudate.   Eyes:      General: No scleral icterus.     Conjunctiva/sclera: Conjunctivae normal.      Pupils: Pupils are equal, round, and reactive to light.   Cardiovascular:      Rate and Rhythm: Normal rate and regular rhythm.      Heart sounds: Normal heart sounds. No murmur heard.      Pulmonary:      Effort: Pulmonary effort is normal. No respiratory distress.      Breath sounds: Normal breath sounds.   Abdominal:      General: Bowel sounds are normal. There is no distension.      Palpations: Abdomen is soft.      Tenderness: There is no abdominal tenderness.   Genitourinary:     Comments: Suprapubic catheter   Musculoskeletal:         General: Normal range of motion.      Cervical back: Normal range of motion and neck supple.   Skin:     General: Skin is warm and  dry.      Findings: No erythema.   Neurological:      Mental Status: She is alert and oriented to person, place, and time.      Cranial Nerves: No cranial nerve deficit.   Psychiatric:         Mood and Affect: Affect normal.         Cognition and Memory: Memory normal.         Judgment: Judgment normal.         Laboratory:  Recent Labs   Lab 02/04/22 0437 02/04/22 0437 02/05/22 0308 02/05/22 0527   WBC 4.87  --  5.42 5.61   HGB 11.0*  --  11.2* 11.3*   HCT 32.9*  --  33.7* 34.9*     --  192 175   MONO 11.1  0.5   < > 12.0  0.7 10.9  0.6    < > = values in this interval not displayed.     Recent Labs   Lab 02/05/22 0527 02/06/22  0506 02/07/22  0557   * 136 140   K 4.2 3.9 4.2   CL 97 97 99   CO2 22* 27 24   BUN 24* 24* 23   CREATININE 7.4* 7.2* 6.4*   CALCIUM 8.0* 8.0* 8.1*   PHOS 5.8* 5.5* 5.3*       Diagnostic Results:  X-Ray: Reviewed  US: Reviewed  Echo: Reviewed  ASSESSMENT/PLAN:     1. CARITO/Acidosis/Hyponatremia   - as of now ATN due to UTI, no obstruction,  US/CT kidney noncontributory   -- Cr still up 7.2-7.4 no uremia symptoms however hard to assess unknown patient baseline mental and functional.  -- Treat UTI now on both fluconazole and Ceftriaxone Cr improving 6.4 today   -- Stopped  IVF has trace edema good UOP edema  chronic but no improvement   Cr not pre renal at all.   -- Monitor for now seems to be improving fits ATN picture     2. HTN (I10) - at range   3. Anemia    Recent Labs   Lab 02/04/22 0437 02/05/22 0308 02/05/22 0527   HGB 11.0* 11.2* 11.3*   HCT 32.9* 33.7* 34.9*    192 175       Iron   Lab Results   Component Value Date    IRON 92 06/10/2021    TIBC 330 06/10/2021    FERRITIN 121 06/10/2021       4. MBD (E88.9 M90.80) -  Recent Labs   Lab 02/07/22  0557   CALCIUM 8.1*   PHOS 5.3*     Recent Labs   Lab 02/05/22  0527 02/06/22  0506 02/07/22  0557   [facility] 1.9 1.9 1.9   [facility]     Lab Results   Component Value Date    CALCIUM 8.1 (L) 02/07/2022    PHOS 5.3 (H)  02/07/2022     Lab Results   Component Value Date    [id number] 18 (L) 10/19/2020   calcitriol     Lab Results   Component Value Date    CO2 24 02/07/2022       5. Acidosis start PO bicarb   6. Nutrition/Hypoalbuminemia (E88.09) -   Recent Labs   Lab 02/03/22  1328   ALBUMIN 3.9     Nepro with meals TID. Renal vitamins daily      Thank you for the consult, will follow  With any question please call [phone]  Fernando Booker MD    [facility]  NIMA Wheeler MD, FACP,   NIEVES Pabon MD,   MD ELIS Gutierrez MD E. V. Harmon, NP    [address]  [place], [address] [phone]

## 2022-02-08 ENCOUNTER — TELEPHONE (OUTPATIENT)
Dept: INTERNAL MEDICINE | Facility: CLINIC | Age: 67
End: 2022-02-08
Payer: MEDICARE

## 2022-02-08 LAB
ALBUMIN SERPL BCP-MCNC: 3 G/DL (ref 3.5–5.2)
ANION GAP SERPL CALC-SCNC: 10 MMOL/L (ref 8–16)
BACTERIA BLD CULT: NORMAL
BACTERIA BLD CULT: NORMAL
BUN SERPL-MCNC: 21 MG/DL (ref 8–23)
CALCIUM SERPL-MCNC: 8.8 MG/DL (ref 8.7–10.5)
CHLORIDE SERPL-SCNC: 98 MMOL/L (ref 95–110)
CO2 SERPL-SCNC: 28 MMOL/L (ref 23–29)
CREAT SERPL-MCNC: 5.2 MG/DL (ref 0.5–1.4)
EST. GFR  (AFRICAN AMERICAN): 9 ML/MIN/1.73 M^2
EST. GFR  (NON AFRICAN AMERICAN): 8 ML/MIN/1.73 M^2
GLUCOSE SERPL-MCNC: 79 MG/DL (ref 70–110)
PHOSPHATE SERPL-MCNC: 4.4 MG/DL (ref 2.7–4.5)
POTASSIUM SERPL-SCNC: 4.2 MMOL/L (ref 3.5–5.1)
SODIUM SERPL-SCNC: 136 MMOL/L (ref 136–145)

## 2022-02-08 PROCEDURE — 94799 UNLISTED PULMONARY SVC/PX: CPT | Mod: HCNC

## 2022-02-08 PROCEDURE — 94761 N-INVAS EAR/PLS OXIMETRY MLT: CPT | Mod: HCNC

## 2022-02-08 PROCEDURE — 99900035 HC TECH TIME PER 15 MIN (STAT): Mod: HCNC

## 2022-02-08 PROCEDURE — 25000003 PHARM REV CODE 250: Mod: HCNC | Performed by: STUDENT IN AN ORGANIZED HEALTH CARE EDUCATION/TRAINING PROGRAM

## 2022-02-08 PROCEDURE — 63600175 PHARM REV CODE 636 W HCPCS: Mod: HCNC | Performed by: NURSE PRACTITIONER

## 2022-02-08 PROCEDURE — 63600175 PHARM REV CODE 636 W HCPCS: Mod: HCNC | Performed by: INTERNAL MEDICINE

## 2022-02-08 PROCEDURE — 36415 COLL VENOUS BLD VENIPUNCTURE: CPT | Mod: HCNC | Performed by: HOSPITALIST

## 2022-02-08 PROCEDURE — 25000003 PHARM REV CODE 250: Mod: HCNC | Performed by: EMERGENCY MEDICINE

## 2022-02-08 PROCEDURE — 97116 GAIT TRAINING THERAPY: CPT | Mod: HCNC,CQ

## 2022-02-08 PROCEDURE — 80069 RENAL FUNCTION PANEL: CPT | Mod: HCNC | Performed by: HOSPITALIST

## 2022-02-08 PROCEDURE — 63700000 PHARM REV CODE 250 ALT 637 W/O HCPCS: Mod: HCNC | Performed by: INTERNAL MEDICINE

## 2022-02-08 PROCEDURE — 97110 THERAPEUTIC EXERCISES: CPT | Mod: HCNC,CQ

## 2022-02-08 PROCEDURE — 25000003 PHARM REV CODE 250: Mod: HCNC | Performed by: HOSPITALIST

## 2022-02-08 PROCEDURE — 63600175 PHARM REV CODE 636 W HCPCS: Mod: HCNC | Performed by: EMERGENCY MEDICINE

## 2022-02-08 PROCEDURE — 63600175 PHARM REV CODE 636 W HCPCS: Mod: HCNC

## 2022-02-08 PROCEDURE — 21400001 HC TELEMETRY ROOM: Mod: HCNC

## 2022-02-08 PROCEDURE — 94660 CPAP INITIATION&MGMT: CPT | Mod: HCNC

## 2022-02-08 PROCEDURE — 25000003 PHARM REV CODE 250: Mod: HCNC | Performed by: NURSE PRACTITIONER

## 2022-02-08 PROCEDURE — 27000221 HC OXYGEN, UP TO 24 HOURS: Mod: HCNC

## 2022-02-08 RX ORDER — NALOXONE HCL 0.4 MG/ML
0.4 VIAL (ML) INJECTION ONCE
Status: COMPLETED | OUTPATIENT
Start: 2022-02-08 | End: 2022-02-08

## 2022-02-08 RX ADMIN — FLUCONAZOLE 100 MG: 100 TABLET ORAL at 08:02

## 2022-02-08 RX ADMIN — FLUOXETINE HYDROCHLORIDE 60 MG: 20 CAPSULE ORAL at 08:02

## 2022-02-08 RX ADMIN — ATORVASTATIN CALCIUM 80 MG: 40 TABLET, FILM COATED ORAL at 08:02

## 2022-02-08 RX ADMIN — OXYBUTYNIN CHLORIDE 15 MG: 5 TABLET, EXTENDED RELEASE ORAL at 08:02

## 2022-02-08 RX ADMIN — TRAZODONE HYDROCHLORIDE 300 MG: 100 TABLET ORAL at 09:02

## 2022-02-08 RX ADMIN — HEPARIN SODIUM 5000 UNITS: 5000 INJECTION INTRAVENOUS; SUBCUTANEOUS at 01:02

## 2022-02-08 RX ADMIN — PANTOPRAZOLE SODIUM 40 MG: 40 TABLET, DELAYED RELEASE ORAL at 08:02

## 2022-02-08 RX ADMIN — SEVELAMER CARBONATE 800 MG: 800 TABLET, FILM COATED ORAL at 11:02

## 2022-02-08 RX ADMIN — HYDROCODONE BITARTRATE AND ACETAMINOPHEN 1 TABLET: 5; 325 TABLET ORAL at 08:02

## 2022-02-08 RX ADMIN — LEVOTHYROXINE SODIUM 125 MCG: 0.03 TABLET ORAL at 05:02

## 2022-02-08 RX ADMIN — STANDARDIZED SENNA CONCENTRATE 2 TABLET: 8.6 TABLET ORAL at 09:02

## 2022-02-08 RX ADMIN — NALXONE HYDROCHLORIDE 0.4 MG: 0.4 INJECTION INTRAMUSCULAR; INTRAVENOUS; SUBCUTANEOUS at 08:02

## 2022-02-08 RX ADMIN — HEPARIN SODIUM 5000 UNITS: 5000 INJECTION INTRAVENOUS; SUBCUTANEOUS at 05:02

## 2022-02-08 RX ADMIN — CEFEPIME 1 G: 1 INJECTION, POWDER, FOR SOLUTION INTRAMUSCULAR; INTRAVENOUS at 09:02

## 2022-02-08 RX ADMIN — HYDROXYZINE PAMOATE 25 MG: 25 CAPSULE ORAL at 08:02

## 2022-02-08 RX ADMIN — MUPIROCIN: 20 OINTMENT TOPICAL at 09:02

## 2022-02-08 RX ADMIN — ONDANSETRON 4 MG: 2 INJECTION INTRAMUSCULAR; INTRAVENOUS at 01:02

## 2022-02-08 RX ADMIN — STANDARDIZED SENNA CONCENTRATE 2 TABLET: 8.6 TABLET ORAL at 08:02

## 2022-02-08 RX ADMIN — QUETIAPINE FUMARATE 200 MG: 100 TABLET ORAL at 09:02

## 2022-02-08 RX ADMIN — HEPARIN SODIUM 5000 UNITS: 5000 INJECTION INTRAVENOUS; SUBCUTANEOUS at 09:02

## 2022-02-08 RX ADMIN — ASPIRIN 81 MG: 81 TABLET, COATED ORAL at 08:02

## 2022-02-08 RX ADMIN — SEVELAMER CARBONATE 800 MG: 800 TABLET, FILM COATED ORAL at 04:02

## 2022-02-08 RX ADMIN — MUPIROCIN: 20 OINTMENT TOPICAL at 08:02

## 2022-02-08 RX ADMIN — SODIUM CHLORIDE, SODIUM LACTATE, POTASSIUM CHLORIDE, AND CALCIUM CHLORIDE 250 ML: .6; .31; .03; .02 INJECTION, SOLUTION INTRAVENOUS at 12:02

## 2022-02-08 RX ADMIN — SODIUM BICARBONATE 650 MG: 650 TABLET ORAL at 08:02

## 2022-02-08 RX ADMIN — SEVELAMER CARBONATE 800 MG: 800 TABLET, FILM COATED ORAL at 08:02

## 2022-02-08 NOTE — PT/OT/SLP PROGRESS
Occupational Therapy  Visit Attempt     Patient Name:  Tasha Hawley   MRN:  [id number]    Patient not seen today secondary to Patient fatigue,Pain (pt found UIC; reporting pain and fatigue and this AM; requesting to rest at this time.). Will follow-up as available.    2/8/2022

## 2022-02-08 NOTE — PT/OT/SLP PROGRESS
Physical Therapy Treatment    Patient Name:  Tasha Hawley   MRN:  [id number]    Recommendations:     Discharge Recommendations:  home health PT   Discharge Equipment Recommendations: bath bench   Barriers to discharge: decreased mobility,strength and endurance    Assessment:     Tasha Hawley is a 66 y.o. female admitted with a medical diagnosis of Acute renal failure with tubular necrosis.  She presents with the following impairments/functional limitations:  weakness,impaired endurance,impaired functional mobilty,gait instability,impaired balance,decreased upper extremity function,decreased lower extremity function,pain,decreased ROM,impaired coordination,impaired cardiopulmonary response to activity,pt with good participation and requires assistance with all mobility and will benefit from continuing PT services upon discharge pending progress for HH vs SNF,will discuss with a PT.    Rehab Prognosis: Fair; patient would benefit from acute skilled PT services to address these deficits and reach maximum level of function.    Recent Surgery: * No surgery found *      Plan:     During this hospitalization, patient to be seen 5 x/week to address the identified rehab impairments via gait training,therapeutic activities,therapeutic exercises,neuromuscular re-education and progress toward the following goals:    · Plan of Care Expires:  03/04/22    Subjective     Chief Complaint: n/a  Patient/Family Comments/goals: pt is concerned about her kidney function.  Pain/Comfort:  · Pain Rating 1:  (no rating)  · Location - Orientation 1: lower  · Location 1: back  · Pain Addressed 1: Reposition,Distraction      Objective:     Communicated with nsg prior to session.  Patient found supine with bed alarm,oxygen,peripheral IV upon PT entry to room.     General Precautions: Standard, fall   Orthopedic Precautions:N/A   Braces: N/A  Respiratory Status: Nasal cannula, flow 2 L/min     Functional Mobility:  · Bed Mobility:      · Supine to Sit: minimum assistance  · Transfers:     · Sit to Stand:  contact guard assistance with rolling walker  · Bed to Chair: minimum assistance with  rolling walker  using  ambulation  · Gait: amb ~18' with RW and Min A with O2 donned  · Balance: fair stnding balance with RW      AM-PAC 6 CLICK MOBILITY  Turning over in bed (including adjusting bedclothes, sheets and blankets)?: 3  Sitting down on and standing up from a chair with arms (e.g., wheelchair, bedside commode, etc.): 3  Moving from lying on back to sitting on the side of the bed?: 3  Moving to and from a bed to a chair (including a wheelchair)?: 3  Need to walk in hospital room?: 3  Climbing 3-5 steps with a railing?: 1  Basic Mobility Total Score: 16       Therapeutic Activities and Exercises: seated le ex's x 10-12 reps inc: ap,laq,hip flex.       Patient left up in chair with all lines intact, call button in reach, chair alarm on and nsg present..    GOALS: see general POC  Multidisciplinary Problems     Physical Therapy Goals        Problem: Physical Therapy Goal    Goal Priority Disciplines Outcome Goal Variances Interventions   Physical Therapy Goal     PT, PT/OT Ongoing, Progressing     Description: Goals to be met by: 3/4/22     Patient will increase functional independence with mobility by performin. Supine <> sit with supervision  2. Sit to stand transfer with supervision  3. Bed to chair transfer with Supervision using Rolling Walker  4. Gait  x 50 feet with Supervision using Rolling Walker.   5. Lower extremity exercise program x 10 reps per handout, with supervision                     Time Tracking:     PT Received On: 22  PT Start Time: 817     PT Stop Time: 843  PT Total Time (min): 26 min     Billable Minutes: Gait Training 15 and Therapeutic Exercise 11    Treatment Type: Treatment  PT/PTA: PTA     PTA Visit Number: 3     2022

## 2022-02-08 NOTE — PLAN OF CARE
02/08/22 1225   Post-Acute Status   Post-Acute Authorization Home Health   Home Health Status Referrals Sent  (Intial  referral sent to [facility].)   Discharge Plan   Discharge Plan A Home Health

## 2022-02-08 NOTE — TELEPHONE ENCOUNTER
----- Message from Malcolm Magdaleno sent at 2/8/2022 12:22 PM CST -----  Contact: [phone]  Pt  calling to inform you that the patient is admitted in the hospital and wanted to speak with someone about this.Please advise

## 2022-02-08 NOTE — PLAN OF CARE
AAOX4. Medications given per MAR. Safety maintained. Call bell within reach. Patient encouraged to call for assistance. Pt denies vomiting, SOB, distress, and pain. Nausea relieved with PRN zofran. Vital signs stable throughout the night. Afebrile. Will continue to monitor.

## 2022-02-08 NOTE — PLAN OF CARE
Patient is awake. Continued moments of lethargy and disorientation to place, time, and situation. 2L NC. Zofran administered for nausea, no emesis. Suprapubic catheter present, patent/draining. Output charted. Tele monitored, Pt. Bradycardic with HR remaining in in mid 40's-50s. LR bolus administered for decreased BP. Bed alarm on and call light in reach, Pt. Instructed to call for assistance. Will continue to monitor.    Problem: Adult Inpatient Plan of Care  Goal: Plan of Care Review  Outcome: Ongoing, Progressing  Goal: Patient-Specific Goal (Individualized)  Outcome: Ongoing, Progressing  Goal: Absence of Hospital-Acquired Illness or Injury  Outcome: Ongoing, Progressing  Goal: Optimal Comfort and Wellbeing  Outcome: Ongoing, Progressing  Goal: Readiness for Transition of Care  Outcome: Ongoing, Progressing     Problem: Fluid and Electrolyte Imbalance (Acute Kidney Injury/Impairment)  Goal: Fluid and Electrolyte Balance  Outcome: Ongoing, Progressing     Problem: Oral Intake Inadequate (Acute Kidney Injury/Impairment)  Goal: Optimal Nutrition Intake  Outcome: Ongoing, Progressing     Problem: Renal Function Impairment (Acute Kidney Injury/Impairment)  Goal: Effective Renal Function  Outcome: Ongoing, Progressing     Problem: Fluid Imbalance (Pneumonia)  Goal: Fluid Balance  Outcome: Ongoing, Progressing     Problem: Infection (Pneumonia)  Goal: Resolution of Infection Signs and Symptoms  Outcome: Ongoing, Progressing     Problem: Respiratory Compromise (Pneumonia)  Goal: Effective Oxygenation and Ventilation  Outcome: Ongoing, Progressing     Problem: Obstructive Sleep Apnea Risk or Actual Comorbidity Management  Goal: Unobstructed Breathing During Sleep  Outcome: Ongoing, Progressing     Problem: Urinary Retention  Goal: Effective Urinary Elimination  Outcome: Ongoing, Progressing     Problem: Behavioral Health Comorbidity  Goal: Maintenance of Behavioral Health Symptom Control  Outcome: Ongoing, Progressing      Problem: Pain Chronic (Persistent) (Comorbidity Management)  Goal: Acceptable Pain Control and Functional Ability  Outcome: Ongoing, Progressing     Problem: Heart Failure Comorbidity  Goal: Maintenance of Heart Failure Symptom Control  Outcome: Ongoing, Progressing

## 2022-02-08 NOTE — PROGRESS NOTES
Nephrology Progress Note     Consult Requested By: Braydon Martel MD  Reason for Consult: CARITO    SUBJECTIVE:      ?    Review of Systems   Constitutional: Positive for malaise/fatigue. Negative for chills and fever.   HENT: Negative for congestion and sore throat.    Eyes: Negative for blurred vision, double vision and photophobia.   Respiratory: Negative for cough and shortness of breath.    Cardiovascular: Negative for chest pain, palpitations and leg swelling.   Gastrointestinal: Negative for abdominal pain, diarrhea, nausea and vomiting.   Genitourinary: Negative for dysuria and urgency.   Musculoskeletal: Negative for joint pain and myalgias.   Skin: Negative for itching and rash.   Neurological: Positive for weakness. Negative for dizziness, sensory change and headaches.   Endo/Heme/Allergies: Negative for polydipsia. Does not bruise/bleed easily.   Psychiatric/Behavioral: Positive for memory loss. Negative for depression. The patient is nervous/anxious and has insomnia.        Past Medical History:   Diagnosis Date    Anticoagulant long-term use     Anxiety     Arthritis     Bilateral lower extremity edema     severe chronic    Carotid artery occlusion     Cataract     CHF (congestive heart failure)     Coronary artery disease     subtotalled LAD with collateral    Depression     Fever blister     Hypothyroid     Iron deficiency anemia     Lumbar radiculopathy     with chronic pain    Ocular migraine     Renal disorder     Sleep apnea     cpap     Past Surgical History:   Procedure Laterality Date    ADENOIDECTOMY      BACK SURGERY      x 12    CARDIAC CATHETERIZATION  2016    subtotalled LAD with right to left collaterals    CATARACT EXTRACTION W/  INTRAOCULAR LENS IMPLANT Left     Dr Coleman     CYSTOSCOPIC LITHOLAPAXY N/A 6/27/2019    Procedure: CYSTOLITHOLAPAXY;  Surgeon: Shireen Mayo MD;  Location: [facility] OR [address] [place] [address];  Service: Urology;  Laterality: N/A;    CYSTOSCOPIC  LITHOLAPAXY N/A 9/3/2019    Procedure: CYSTOLITHOLAPAXY;  Surgeon: Shireen Mayo MD;  Location: [facility] OR 2ND FLR;  Service: Urology;  Laterality: N/A;    CYSTOSCOPY N/A 7/13/2021    Procedure: CYSTOSCOPY;  Surgeon: Shireen Mayo MD;  Location: [facility] OR 1ST FLR;  Service: Urology;  Laterality: N/A;    CYSTOSCOPY  11/16/2021    Procedure: CYSTOSCOPY;  Surgeon: Shireen Mayo MD;  Location: [facility] OR 1ST FLR;  Service: Urology;;    ESOPHAGOGASTRODUODENOSCOPY N/A 5/23/2018    Procedure: ESOPHAGOGASTRODUODENOSCOPY (EGD);  Surgeon: Prince Vance MD;  Location: [facility] (4TH FLR);  Service: Endoscopy;  Laterality: N/A;  r/s 'd per Dr. Vance due to family emergency- ER    HYSTERECTOMY  1975    endometriosis    INJECTION OF BOTULINUM TOXIN TYPE A  7/13/2021    Procedure: INJECTION, BOTULINUM TOXIN, 200units;  Surgeon: Shireen Mayo MD;  Location: [facility] OR 1ST FLR;  Service: Urology;;    INJECTION OF BOTULINUM TOXIN TYPE A  11/16/2021    Procedure: INJECTION, BOTULINUM TOXIN, 200units;  Surgeon: Shireen Mayo MD;  Location: [facility] OR 1ST FLR;  Service: Urology;;    pain pump placement      SQ Dilaudid Pump managed by Dr. Hillman, Pain Management    REPLACEMENT OF CATHETER N/A 10/31/2019    Procedure: REPLACEMENT, CATHETER-SUPRAPUBIC;  Surgeon: Shireen Mayo MD;  [facility];  Service: Urology;  Laterality: N/A;    SPINAL CORD STIMULATOR REMOVAL      before [place]    SPINE SURGERY  5-13-13    CERVICAL FUSION    TONSILLECTOMY       Family History   Problem Relation Age of Onset    Cancer Mother 55        breast    Cancer Father         esophagus,had laryngectomy    Esophageal cancer Father     Parkinsonism Maternal Grandmother     Tremor Maternal Grandmother     No Known Problems Brother     No Known Problems Brother     Heart disease Maternal Uncle     Colon cancer Maternal Uncle         Less than 60    No Known Problems Sister     No Known Problems Maternal Aunt      Cirrhosis Paternal Aunt         ETOH    Liver disease Paternal Aunt         ETOH    Liver disease Paternal Uncle         ETOH    Cirrhosis Paternal Uncle         ETOH    No Known Problems Maternal Grandfather     No Known Problems Paternal Grandmother     No Known Problems Paternal Grandfather     Melanoma Neg Hx     Amblyopia Neg Hx     Blindness Neg Hx     Cataracts Neg Hx     Diabetes Neg Hx     Glaucoma Neg Hx     Hypertension Neg Hx     Macular degeneration Neg Hx     Retinal detachment Neg Hx     Strabismus Neg Hx     Stroke Neg Hx     Thyroid disease Neg Hx     Celiac disease Neg Hx     Colon polyps Neg Hx     Cystic fibrosis Neg Hx     Crohn's disease Neg Hx     Inflammatory bowel disease Neg Hx     Liver cancer Neg Hx     Rectal cancer Neg Hx     Stomach cancer Neg Hx     Ulcerative colitis Neg Hx     Lymphoma Neg Hx      Social History     Tobacco Use    Smoking status: Never Smoker    Smokeless tobacco: Never Used   Substance Use Topics    Alcohol use: Never    Drug use: No       Review of patient's allergies indicates:   Allergen Reactions    (d)-limonene flavor      Other reaction(s): difficult intubation  Other reaction(s): Difficulty breathing    Bactrim [sulfamethoxazole-trimethoprim] Anaphylaxis    Benadryl [diphenhydramine hcl] Shortness Of Breath    Fentanyl Itching, Nausea And Vomiting and Swelling             Imitrex [sumatriptan succinate] Shortness Of Breath    Topamax [topiramate] Shortness Of Breath    Vancomycin Shortness Of Breath     Rash    Butorphanol tartrate     Darvocet a500 [propoxyphene n-acetaminophen]      Other reaction(s): Difficulty breathing    White petrolatum-zinc     Zinc oxide-white petrolatum      Other reaction(s): Difficulty breathing    Latex, natural rubber Itching and Rash    Phenytoin Rash and Other (See Comments)     Trouble breathing            OBJECTIVE:     Vital Signs (Most Recent)  Vitals:    02/08/22 0845  02/08/22 1134 02/08/22 1141 02/08/22 1334   BP:  (!) 92/52  132/70   BP Location:       Patient Position:  Lying     Pulse:  (!) 53 (!) 54 (!) 58   Resp: 18 18     Temp:  98 °F (36.7 °C)     TempSrc:  Oral     SpO2:       Weight:       Height:             Date 02/08/22 0700 - 02/09/22 0659   Shift 3650-4896 4089-6456 3513-9986 24 Hour Total   INTAKE   Shift Total(mL/kg)       OUTPUT   Urine(mL/kg/hr) 800   800   Shift Total(mL/kg) 800(8.6)   800(8.6)   Weight (kg) 93.2 93.2 93.2 93.2           Medications:   aspirin  81 mg Oral Daily    atorvastatin  80 mg Oral Daily    ceFEPime (MAXIPIME) IVPB  1 g Intravenous Q24H    FLUoxetine  60 mg Oral Daily    heparin (porcine)  5,000 Units Subcutaneous Q8H    levothyroxine  125 mcg Oral Before breakfast    mupirocin   Nasal BID    oxybutynin  15 mg Oral Daily    pantoprazole  40 mg Oral Daily    polyethylene glycol  17 g Oral Daily    QUEtiapine  200 mg Oral Nightly    senna  2 tablet Oral BID    sevelamer carbonate  800 mg Oral TID WM    traZODone  300 mg Oral QHS           Physical Exam  Vitals and nursing note reviewed.   Constitutional:       General: She is not in acute distress.     Appearance: She is obese. She is ill-appearing. She is not diaphoretic.   HENT:      Head: Normocephalic and atraumatic.      Mouth/Throat:      Pharynx: No oropharyngeal exudate.   Eyes:      General: No scleral icterus.     Conjunctiva/sclera: Conjunctivae normal.      Pupils: Pupils are equal, round, and reactive to light.   Cardiovascular:      Rate and Rhythm: Normal rate and regular rhythm.      Heart sounds: Normal heart sounds. No murmur heard.      Pulmonary:      Effort: Pulmonary effort is normal. No respiratory distress.      Breath sounds: Normal breath sounds.   Abdominal:      General: Bowel sounds are normal. There is no distension.      Palpations: Abdomen is soft.      Tenderness: There is no abdominal tenderness.   Genitourinary:     Comments: Suprapubic  catheter   Musculoskeletal:         General: Normal range of motion.      Cervical back: Normal range of motion and neck supple.   Skin:     General: Skin is warm and dry.      Findings: No erythema.   Neurological:      Mental Status: She is alert and oriented to person, place, and time.      Cranial Nerves: No cranial nerve deficit.   Psychiatric:         Mood and Affect: Affect normal.         Cognition and Memory: Memory normal.         Judgment: Judgment normal.         Laboratory:  Recent Labs   Lab 02/04/22  0437 02/04/22  0437 02/05/22  0308 02/05/22 0527   WBC 4.87  --  5.42 5.61   HGB 11.0*  --  11.2* 11.3*   HCT 32.9*  --  33.7* 34.9*     --  192 175   MONO 11.1  0.5   < > 12.0  0.7 10.9  0.6    < > = values in this interval not displayed.     Recent Labs   Lab 02/06/22  0506 02/07/22  0557 02/08/22  0623    140 136   K 3.9 4.2 4.2   CL 97 99 98   CO2 27 24 28   BUN 24* 23 21   CREATININE 7.2* 6.4* 5.2*   CALCIUM 8.0* 8.1* 8.8   PHOS 5.5* 5.3* 4.4       Diagnostic Results:  X-Ray: Reviewed  US: Reviewed  Echo: Reviewed  ASSESSMENT/PLAN:     1. CARITO/Acidosis/Hyponatremia   - as of now ATN due to UTI, no obstruction,  US/CT kidney noncontributory   -- Cr  up 7.2-7.4 no uremia symptoms however hard to assess unknown patient baseline mental and functional. Now Improving  Treat UTI now on both fluconazole and Ceftriaxone Cr improving 6.4=>5.2 today      2. HTN (I10) - at range   3. Anemia    Recent Labs   Lab 02/04/22 0437 02/05/22 0308 02/05/22 0527   HGB 11.0* 11.2* 11.3*   HCT 32.9* 33.7* 34.9*    192 175       Iron   Lab Results   Component Value Date    IRON 92 06/10/2021    TIBC 330 06/10/2021    FERRITIN 121 06/10/2021       4. MBD (E88.9 M90.80) -  Recent Labs   Lab 02/08/22  0623   CALCIUM 8.8   PHOS 4.4     Recent Labs   Lab 02/05/22  0527 02/06/22  0506 02/07/22  0557   MG 1.9 1.9 1.9   Sevelamer     Lab Results   Component Value Date    CALCIUM 8.8 02/08/2022    PHOS 4.4  02/08/2022     Lab Results   Component Value Date    ZJTVRQTU86HX 18 (L) 10/19/2020   calcitriol     Lab Results   Component Value Date    CO2 28 02/08/2022       5. Acidosis  - stop  PO bicarb   6. Nutrition/Hypoalbuminemia (E88.09) -   Recent Labs   Lab 02/03/22  1328 02/08/22  0623   ALBUMIN 3.9 3.0*     Nepro with meals TID. Renal vitamins daily      Thank you for the consult, will follow  With any question please call [phone]  Fernando Booker MD    [facility]  NIMA Wheeler MD, FACP,   NIEVES Pabon MD,   MD ELIS Gutierrez MD E. V. Harmon, NP    [address]  [place], [address] [phone]

## 2022-02-08 NOTE — PROGRESS NOTES
[facility]           Pharmacy        Current Drug Shortage     Due to national backorder and [facility] is critically low on inventory of Dextrose 50% (D50) Syringes and Vials, pharmacy has automatically switched from D50% to D10% IVPB at the equivalent dose until resolution of the shortage per P&T approved protocol.               Prerna Carty, PharmD  [phone]

## 2022-02-09 LAB
AMMONIA PLAS-SCNC: 31 UMOL/L (ref 10–50)
ANION GAP SERPL CALC-SCNC: 12 MMOL/L (ref 8–16)
BASOPHILS # BLD AUTO: 0.02 K/UL (ref 0–0.2)
BASOPHILS NFR BLD: 0.4 % (ref 0–1.9)
BUN SERPL-MCNC: 18 MG/DL (ref 8–23)
CALCIUM SERPL-MCNC: 9.4 MG/DL (ref 8.7–10.5)
CHLORIDE SERPL-SCNC: 100 MMOL/L (ref 95–110)
CO2 SERPL-SCNC: 27 MMOL/L (ref 23–29)
CREAT SERPL-MCNC: 3.7 MG/DL (ref 0.5–1.4)
DIFFERENTIAL METHOD: ABNORMAL
EOSINOPHIL # BLD AUTO: 0.3 K/UL (ref 0–0.5)
EOSINOPHIL NFR BLD: 5.7 % (ref 0–8)
ERYTHROCYTE [DISTWIDTH] IN BLOOD BY AUTOMATED COUNT: 14.9 % (ref 11.5–14.5)
EST. GFR  (AFRICAN AMERICAN): 14 ML/MIN/1.73 M^2
EST. GFR  (NON AFRICAN AMERICAN): 12 ML/MIN/1.73 M^2
GLUCOSE SERPL-MCNC: 75 MG/DL (ref 70–110)
HCT VFR BLD AUTO: 34.2 % (ref 37–48.5)
HGB BLD-MCNC: 10.3 G/DL (ref 12–16)
IMM GRANULOCYTES # BLD AUTO: 0.02 K/UL (ref 0–0.04)
IMM GRANULOCYTES NFR BLD AUTO: 0.4 % (ref 0–0.5)
LACTATE SERPL-SCNC: 1.6 MMOL/L (ref 0.5–2.2)
LYMPHOCYTES # BLD AUTO: 1.4 K/UL (ref 1–4.8)
LYMPHOCYTES NFR BLD: 26.1 % (ref 18–48)
MAGNESIUM SERPL-MCNC: 1.9 MG/DL (ref 1.6–2.6)
MCH RBC QN AUTO: 27.6 PG (ref 27–31)
MCHC RBC AUTO-ENTMCNC: 30.1 G/DL (ref 32–36)
MCV RBC AUTO: 92 FL (ref 82–98)
MONOCYTES # BLD AUTO: 0.5 K/UL (ref 0.3–1)
MONOCYTES NFR BLD: 9.1 % (ref 4–15)
NEUTROPHILS # BLD AUTO: 3.2 K/UL (ref 1.8–7.7)
NEUTROPHILS NFR BLD: 58.3 % (ref 38–73)
NRBC BLD-RTO: 0 /100 WBC
PLATELET # BLD AUTO: 177 K/UL (ref 150–450)
PMV BLD AUTO: 10.9 FL (ref 9.2–12.9)
POTASSIUM SERPL-SCNC: 4.4 MMOL/L (ref 3.5–5.1)
RBC # BLD AUTO: 3.73 M/UL (ref 4–5.4)
SODIUM SERPL-SCNC: 139 MMOL/L (ref 136–145)
WBC # BLD AUTO: 5.48 K/UL (ref 3.9–12.7)

## 2022-02-09 PROCEDURE — 63600175 PHARM REV CODE 636 W HCPCS: Mod: HCNC | Performed by: INTERNAL MEDICINE

## 2022-02-09 PROCEDURE — 92610 EVALUATE SWALLOWING FUNCTION: CPT | Mod: HCNC

## 2022-02-09 PROCEDURE — 82140 ASSAY OF AMMONIA: CPT | Mod: HCNC | Performed by: INTERNAL MEDICINE

## 2022-02-09 PROCEDURE — 94660 CPAP INITIATION&MGMT: CPT | Mod: HCNC

## 2022-02-09 PROCEDURE — 97110 THERAPEUTIC EXERCISES: CPT | Mod: HCNC,CQ

## 2022-02-09 PROCEDURE — 85025 COMPLETE CBC W/AUTO DIFF WBC: CPT | Mod: HCNC | Performed by: INTERNAL MEDICINE

## 2022-02-09 PROCEDURE — 36415 COLL VENOUS BLD VENIPUNCTURE: CPT | Mod: HCNC | Performed by: INTERNAL MEDICINE

## 2022-02-09 PROCEDURE — 83735 ASSAY OF MAGNESIUM: CPT | Mod: HCNC | Performed by: INTERNAL MEDICINE

## 2022-02-09 PROCEDURE — 83605 ASSAY OF LACTIC ACID: CPT | Mod: HCNC | Performed by: INTERNAL MEDICINE

## 2022-02-09 PROCEDURE — 94761 N-INVAS EAR/PLS OXIMETRY MLT: CPT | Mod: HCNC

## 2022-02-09 PROCEDURE — 99900035 HC TECH TIME PER 15 MIN (STAT): Mod: HCNC

## 2022-02-09 PROCEDURE — 21400001 HC TELEMETRY ROOM: Mod: HCNC

## 2022-02-09 PROCEDURE — 94799 UNLISTED PULMONARY SVC/PX: CPT | Mod: HCNC

## 2022-02-09 PROCEDURE — 63600175 PHARM REV CODE 636 W HCPCS: Mod: HCNC | Performed by: EMERGENCY MEDICINE

## 2022-02-09 PROCEDURE — 25000003 PHARM REV CODE 250: Mod: HCNC | Performed by: NURSE PRACTITIONER

## 2022-02-09 PROCEDURE — 63600175 PHARM REV CODE 636 W HCPCS: Mod: HCNC | Performed by: NURSE PRACTITIONER

## 2022-02-09 PROCEDURE — 25000003 PHARM REV CODE 250: Mod: HCNC | Performed by: STUDENT IN AN ORGANIZED HEALTH CARE EDUCATION/TRAINING PROGRAM

## 2022-02-09 PROCEDURE — 80048 BASIC METABOLIC PNL TOTAL CA: CPT | Mod: HCNC | Performed by: INTERNAL MEDICINE

## 2022-02-09 PROCEDURE — 27000221 HC OXYGEN, UP TO 24 HOURS: Mod: HCNC

## 2022-02-09 PROCEDURE — 97530 THERAPEUTIC ACTIVITIES: CPT | Mod: HCNC

## 2022-02-09 RX ORDER — TRAZODONE HYDROCHLORIDE 100 MG/1
200 TABLET ORAL NIGHTLY
Status: DISCONTINUED | OUTPATIENT
Start: 2022-02-09 | End: 2022-02-09

## 2022-02-09 RX ORDER — NALOXONE HCL 0.4 MG/ML
0.4 VIAL (ML) INJECTION
Status: DISCONTINUED | OUTPATIENT
Start: 2022-02-09 | End: 2022-02-09

## 2022-02-09 RX ORDER — QUETIAPINE FUMARATE 100 MG/1
100 TABLET, FILM COATED ORAL NIGHTLY
Status: DISCONTINUED | OUTPATIENT
Start: 2022-02-09 | End: 2022-02-09

## 2022-02-09 RX ORDER — NALOXONE HCL 0.4 MG/ML
0.4 VIAL (ML) INJECTION
Status: DISCONTINUED | OUTPATIENT
Start: 2022-02-09 | End: 2022-02-17 | Stop reason: HOSPADM

## 2022-02-09 RX ADMIN — CEFEPIME 1 G: 1 INJECTION, POWDER, FOR SOLUTION INTRAMUSCULAR; INTRAVENOUS at 08:02

## 2022-02-09 RX ADMIN — ATORVASTATIN CALCIUM 80 MG: 40 TABLET, FILM COATED ORAL at 09:02

## 2022-02-09 RX ADMIN — SEVELAMER CARBONATE 800 MG: 800 TABLET, FILM COATED ORAL at 11:02

## 2022-02-09 RX ADMIN — POLYETHYLENE GLYCOL 3350 17 G: 17 POWDER, FOR SOLUTION ORAL at 09:02

## 2022-02-09 RX ADMIN — STANDARDIZED SENNA CONCENTRATE 2 TABLET: 8.6 TABLET ORAL at 09:02

## 2022-02-09 RX ADMIN — SODIUM CHLORIDE, SODIUM LACTATE, POTASSIUM CHLORIDE, AND CALCIUM CHLORIDE 250 ML: .6; .31; .03; .02 INJECTION, SOLUTION INTRAVENOUS at 05:02

## 2022-02-09 RX ADMIN — OXYBUTYNIN CHLORIDE 15 MG: 5 TABLET, EXTENDED RELEASE ORAL at 09:02

## 2022-02-09 RX ADMIN — HEPARIN SODIUM 5000 UNITS: 5000 INJECTION INTRAVENOUS; SUBCUTANEOUS at 05:02

## 2022-02-09 RX ADMIN — HEPARIN SODIUM 5000 UNITS: 5000 INJECTION INTRAVENOUS; SUBCUTANEOUS at 10:02

## 2022-02-09 RX ADMIN — SEVELAMER CARBONATE 800 MG: 800 TABLET, FILM COATED ORAL at 04:02

## 2022-02-09 RX ADMIN — HEPARIN SODIUM 5000 UNITS: 5000 INJECTION INTRAVENOUS; SUBCUTANEOUS at 04:02

## 2022-02-09 RX ADMIN — STANDARDIZED SENNA CONCENTRATE 2 TABLET: 8.6 TABLET ORAL at 08:02

## 2022-02-09 RX ADMIN — FLUOXETINE HYDROCHLORIDE 60 MG: 20 CAPSULE ORAL at 09:02

## 2022-02-09 RX ADMIN — SEVELAMER CARBONATE 800 MG: 800 TABLET, FILM COATED ORAL at 09:02

## 2022-02-09 RX ADMIN — NALOXONE HYDROCHLORIDE 0.4 MG: 0.4 INJECTION, SOLUTION INTRAMUSCULAR; INTRAVENOUS; SUBCUTANEOUS at 05:02

## 2022-02-09 RX ADMIN — LEVOTHYROXINE SODIUM 125 MCG: 0.03 TABLET ORAL at 05:02

## 2022-02-09 RX ADMIN — PANTOPRAZOLE SODIUM 40 MG: 40 TABLET, DELAYED RELEASE ORAL at 09:02

## 2022-02-09 RX ADMIN — MUPIROCIN: 20 OINTMENT TOPICAL at 09:02

## 2022-02-09 RX ADMIN — ASPIRIN 81 MG: 81 TABLET, COATED ORAL at 09:02

## 2022-02-09 RX ADMIN — MUPIROCIN: 20 OINTMENT TOPICAL at 08:02

## 2022-02-09 RX ADMIN — SODIUM CHLORIDE, SODIUM LACTATE, POTASSIUM CHLORIDE, AND CALCIUM CHLORIDE 500 ML: .6; .31; .03; .02 INJECTION, SOLUTION INTRAVENOUS at 11:02

## 2022-02-09 NOTE — PLAN OF CARE
Problem: SLP Goal  Goal: SLP Goal  Description: Short Term Goals:  1. Pt will participate in swallow eval to determine safest diet level.  2. Pt will tolerate current diet renal/regular textures and thin liquids with 80% PO intake, no dysphagia signs  3. SLP will monitor cognitive status to determine etiology of confusion  Further goals pending patient's progress  Outcome: Ongoing, Progressing   ST consult for swallow received this date, continue current diet, pt with intermittent confusion of unknown etiology. SLP will monitor and assess next date, eval limited this date as pt was agitated and restless during assessment. Notified RN and MD s/p session.

## 2022-02-09 NOTE — NURSING
Assumed care for patient and patient had altered mental status with a POSS score of 3. Pt appears to be very sedated with muscle twitching. Pt disoriented X4 and cannot follow conversations. Pt hearing seems to have decreased or patient cannot stay with conversation. MD notified and coming to bedside. MARCELLUS

## 2022-02-09 NOTE — PT/OT/SLP PROGRESS
Occupational Therapy   Treatment    Name: Tasha Hawley  MRN: [id number]  Admitting Diagnosis:  Acute renal failure with tubular necrosis       Recommendations:     Discharge Recommendations: nursing facility, skilled  Discharge Equipment Recommendations:  bath bench  Barriers to discharge:   (currently requires increased physical assist)    Assessment:     Tasha Hawley is a 66 y.o. female with a medical diagnosis of Acute renal failure with tubular necrosis.  She presents with The primary encounter diagnosis was CARITO (acute kidney injury). A diagnosis of Chest pain was also pertinent to this visit.  . Performance deficits affecting function are weakness,gait instability,impaired balance,impaired endurance,impaired self care skills,impaired functional mobilty,decreased upper extremity function,decreased lower extremity function,decreased ROM,impaired coordination,decreased safety awareness,impaired cognition,decreased coordination,edema,impaired fine motor.     Pt with increased confusion this date, requires increased assist for axs and functional mobility, and demonstrates uncontrolled muscle jerks/body tremors while seated and standing. Currently demonstrating impairments that will require update recs to SNF placement     Rehab Prognosis:  Good; patient would benefit from acute skilled OT services to address these deficits and reach maximum level of function.       Plan:     Patient to be seen 3 x/week to address the above listed problems via self-care/home management,therapeutic activities,therapeutic exercises  · Plan of Care Expires: 03/04/22  · Plan of Care Reviewed with: patient    Subjective     Pain/Comfort:  · Pain Rating 1: 0/10    Objective:     Communicated with: aminah prior to session.    General Precautions: Standard, fall   Orthopedic Precautions:N/A   Braces: N/A       Occupational Performance:     Bed Mobility:    · Patient completed Scooting/Bridging with contact guard assistance  · Patient  completed Supine to Sit with minimum assistance  · Patient completed Sit to Supine with minimum assistance     Functional Mobility/Transfers:  · Patient completed Sit <> Stand Transfer with minimum assistance  with  rolling walker   · Functional Mobility: Min A with RW; tremulous with uncontrolled body jerking     Activities of Daily Living:  · Grooming: contact guard assistance seated EOB to wash face       AMPAC 6 Click ADL: 15    Treatment & Education:  Pt found supine; confused; fidgeting and grabbing/rearranging b/s table; hallucinations during session; seeing pills on the ground and breakfast tray that was not present; thinking therapist left the room, but remained insight throughout session   Bed mobility as above   Leaning forward while seated to adjust B socks   Uncontrolled body jerking noted in seating and stance   Stood x 1 trial from EOB with RW; min A required for limited ambulation; unsafe to participate in standing ADLs 2/2 body jerking and tremors; pt drifting off whlie in stance with BLEs buckling   Returned to seated position  Lateral seated scooting to HOB CGA   Returned to supine with nursing present     Patient left supine with all lines intact, call button in reach, bed alarm on, nsg notified and present presentEducation:      GOALS:   Multidisciplinary Problems     Occupational Therapy Goals        Problem: Occupational Therapy Goal    Goal Priority Disciplines Outcome Interventions   Occupational Therapy Goal     OT, PT/OT Ongoing, Progressing    Description: Goals to be met by: 3/4/22     Patient will increase functional independence with ADLs by performing:    LE Dressing with Minimal Assistance.  Grooming while standing with Supervision.  Toileting from toilet with Supervision for hygiene and clothing management.   Supine to sit with Supervision.  Step transfer with Supervision  Toilet transfer to toilet with Supervision.  Increased functional strength to WFL for self care skills and  functional mobility.  Upper extremity exercise program x10 reps per handout, with independence.                     Time Tracking:     OT Date of Treatment: 02/09/22  OT Start Time: 1131  OT Stop Time: 1145  OT Total Time (min): 14 min    Billable Minutes:Therapeutic Activity 14    OT/SAMANTHA: OT     SAMANTHA Visit Number: 0    2/9/2022

## 2022-02-09 NOTE — PLAN OF CARE
Disoriented X4. Medications given per MAR. Safety maintained. Call bell within reach. Patient encouraged to call for assistance. Pt denies N/V, SOB, distress, and pain. Pt states that she feels agitated. Tele and output monitored. Catheter care performed and patency maintained. Vital signs stable throughout the night. Continues to be bradycardic. Afebrile. Will continue to monitor.

## 2022-02-09 NOTE — PROGRESS NOTES
[facility] Medicine  Progress Note    Patient Name: Tasha Hawley  MRN: [id number]  Patient Class: IP- Inpatient   Admission Date: 2/3/2022  Length of Stay: 5 days  Attending Physician: Braydon Martel MD  Primary Care Provider: Mesfin Hodges Ii, MD        Subjective:     Principal Problem:Acute renal failure with tubular necrosis        HPI:  Tasha Hawley is a 67 yo female with a pmh of neurogenic bladder, suprapubic catheter,recurrent UTI, chronic pain with epidural pain pump, CAD, hypothyroidism, sleep apnea. She c/o malaise, poor PO intake, N/V, chills, decreased urine output, weakness, chronic pain. She was seen by ID today and noted to have cloudy urine. She just finished a course of cipro and has recurrent UTIs. She states she has not been feeling well for a few weeks. She feels as though she is leaking urine from her urethra. She has home health with [facility] and states they changed her catheter 2 days ago (no record on chart) and she is not sure if a urine sample was obtained at that time. She has chronic swelling to her lower legs and walks with a walker. She fell a few days ago with no injury.       Overview/Hospital Course:  No notes on file    Interval History: Kidney function continues to improve. Good urine output. Pt has no complaints. Denies fever/chills.     Review of Systems   Constitutional: Negative for appetite change and fever.   Respiratory: Negative for shortness of breath.    Cardiovascular: Negative for chest pain.   Neurological: Positive for weakness.   Psychiatric/Behavioral: Negative for sleep disturbance.     Objective:     Vital Signs (Most Recent):  Temp: 97.5 °F (36.4 °C) (02/08/22 2019)  Pulse: (!) 48 (02/08/22 2019)  Resp: 20 (02/08/22 2019)  BP: (!) 108/59 (02/08/22 2019)  SpO2: (!) 94 % (02/08/22 1940) Vital Signs (24h Range):  Temp:  [97.5 °F (36.4 °C)-98.3 °F (36.8 °C)] 97.5 °F (36.4 °C)  Pulse:  [46-58] 48  Resp:  [16-20] 20  SpO2:  [93 %-99 %] 94 %  BP:  ()/(50-82) 108/59     Weight: 93.2 kg (205 lb 7.5 oz)  Body mass index is 35.27 kg/m².    Intake/Output Summary (Last 24 hours) at 2/8/2022 2039  Last data filed at 2/8/2022 1726  Gross per 24 hour   Intake 390.92 ml   Output 2800 ml   Net -2409.08 ml      Physical Exam  Vitals and nursing note reviewed.   Constitutional:       General: She is not in acute distress.     Appearance: She is ill-appearing.   HENT:      Head: Normocephalic and atraumatic.      Nose: Nose normal.      Mouth/Throat:      Mouth: Mucous membranes are moist.   Eyes:      Pupils: Pupils are equal, round, and reactive to light.   Cardiovascular:      Rate and Rhythm: Normal rate and regular rhythm.      Pulses: Normal pulses.      Heart sounds: Normal heart sounds.   Pulmonary:      Effort: Pulmonary effort is normal.      Breath sounds: Normal breath sounds.   Abdominal:      General: Bowel sounds are normal.      Palpations: Abdomen is soft.      Comments: Suprapubic catheter   Musculoskeletal:         General: Tenderness present. Normal range of motion.      Cervical back: Normal range of motion.      Right lower leg: Edema present.      Left lower leg: Edema present.   Skin:     General: Skin is warm and dry.   Neurological:      Mental Status: She is alert and oriented to person, place, and time.      Motor: Weakness present.      Comments: R facial droop, chronic per patient   Psychiatric:         Behavior: Behavior normal.         Significant Labs: All pertinent labs within the past 24 hours have been reviewed.    Significant Imaging: I have reviewed all pertinent imaging results/findings within the past 24 hours.      Assessment/Plan:      * Acute renal failure with tubular necrosis  BUN 22. Creatinine 7.2, baseline 1.2  Reports poor PO intake, N/V, decreased urine output  Catheter draining  Given 2.5L IV fluids in ED  -initially treated with IV fluids without improvement; will stop  -consult nephrology for eval  -hold  torsemide  -renally dose meds  -repeat BMP daily  -cont to improve. Good urine output.    Abdominal pain  - hyoscyamine prn added, did not use dose yet; try today  - bowel regimen      Anxiety  - hydroxyzine prn      Hyponatremia  Na 129, baseline 139  In setting of CARITO and poor PO intake  -given 2.5L LR in ED  -cont LR   -monitor labs  -hold torsemide  -nephrology consulted    Bilateral lower extremity edema  Chronic  Hold torsemide for renal failure      Urinary tract infection associated with cystostomy catheter  Recurrent UTI, completed course of cipro 2 days ago  Followed by ID and urology outpatient  Dark, cloudy urine noted. Patient c/o malaise, chills, N/V, weakness, decreased PO intake  Given a dose of rocephin in ED  -switched to cefepime given recent urine culture sensitivities on 1/21  -urine and blood cx pending; also with candida in urine which may be colonization, on fluconazole for now per Nephro  -consult urology for catheter change and eval, performed on 2/4  -cont treatment with cefepime/fluconazole as per nephrology          Chronic diastolic heart failure  Denies SOB, does have chronic BLE  No respiratory distress noted on room air  -chest XRAY with bilateral atelectasis; incentive spirometry  -hold torsemide 2/2 renal failure      Bradycardia  Chronic  EKG not in chart  HR 40s-50s, asymptomatic  Cardiac monitoring      Primary hypothyroidism  Cont synthroid      Frequent falls  Consult PT/OT for eval: recommend HH      Neurogenic bladder  Has chronic indwelling emadows  -cont ditropan  -discontinue zanaflex        Chronic pain syndrome  Has dilaudid intrathecal pump, states it doesn't work  -cont home norco dose  -bowel regimen  -monitor closely       Major depressive disorder, recurrent, mild  Cont prozac      Sleep apnea  Non-compliant with CPAP at home  O2 sat drops while sleeping  -CPAP at night        VTE Risk Mitigation (From admission, onward)         Ordered     heparin (porcine)  injection 5,000 Units  Every 8 hours         02/03/22 1734     IP VTE HIGH RISK PATIENT  Once         02/03/22 1734     Place sequential compression device  Until discontinued         02/03/22 1734                Discharge Planning   JACKIE: 2/8/2022     Code Status: Full Code   Is the patient medically ready for discharge?:     Reason for patient still in hospital (select all that apply): Patient trending condition, Laboratory test, Treatment and Consult recommendations  Discharge Plan A: Home Health                  Braydon Martel MD  Department of Hospital Medicine   [facility]

## 2022-02-09 NOTE — ASSESSMENT & PLAN NOTE
Recurrent UTI, completed course of cipro 2 days ago  Followed by ID and urology outpatient  Dark, cloudy urine noted. Patient c/o malaise, chills, N/V, weakness, decreased PO intake  Given a dose of rocephin in ED  -switched to cefepime given recent urine culture sensitivities on 1/21  -urine and blood cx pending; also with candida in urine which may be colonization, on fluconazole for now per Nephro  -consult urology for catheter change and eval, performed on 2/4  -cont treatment with cefepime/fluconazole as per nephrology

## 2022-02-09 NOTE — PT/OT/SLP EVAL
Speech Language Pathology Evaluation  Bedside Swallow    Patient Name:  Tasha Hawley   MRN:  [id number]  Admitting Diagnosis: Acute renal failure with tubular necrosis    Recommendations:                 General Recommendations:  monitor with tray/meal at bedside, monitor confusion  Diet recommendations:  Regular, Thin   Aspiration Precautions: upright for meals, small sips/bites, encourage self feeding, slow rate, alternate sips and bites, whole meds with water  Tray set-up for meals   General Precautions: Standard, fall,respiratory,hearing impaired  Communication strategies:  Premorbid facial weakness and slurred speech is NOT new, slow to follow commands, redirect and reorient to current situation     History per NP     Subjective:      Principal Problem:Acute renal failure     Chief Complaint:        Chief Complaint   Patient presents with    Fatigue       Pt was referred to ed from infectious disease, for cloudy urine in suprapubic cath, feeling fatigued, with decreased po intake due to n/v          HPI: Tasha Hawley is a 65 yo female with a pmh of neurogenic bladder, suprapubic catheter,recurrent UTI, chronic pain with epidural pain pump, CAD, hypothyroidism, sleep apnea. She c/o malaise, poor PO intake, N/V, chills, decreased urine output, weakness, chronic pain. She was seen by ID today and noted to have cloudy urine. She just finished a course of cipro and has recurrent UTIs. She states she has not been feeling well for a few weeks. She feels as though she is leaking urine from her urethra. She has home health with [facility] and states they changed her catheter 2 days ago (no record on chart) and she is not sure if a urine sample was obtained at that time. She has chronic swelling to her lower legs and walks with a walker. She fell a few days ago with no injury.         Past Medical History:   Diagnosis Date    Anticoagulant long-term use     Anxiety     Arthritis     Bilateral lower extremity edema      severe chronic    Carotid artery occlusion     Cataract     CHF (congestive heart failure)     Coronary artery disease     subtotalled LAD with collateral    Depression     Fever blister     Hypothyroid     Iron deficiency anemia     Lumbar radiculopathy     with chronic pain    Ocular migraine     Renal disorder     Sleep apnea     cpap       Past Surgical History:   Procedure Laterality Date    ADENOIDECTOMY      BACK SURGERY      x 12    CARDIAC CATHETERIZATION  2016    subtotalled LAD with right to left collaterals    CATARACT EXTRACTION W/  INTRAOCULAR LENS IMPLANT Left     Dr Coleman     CYSTOSCOPIC LITHOLAPAXY N/A 6/27/2019    Procedure: CYSTOLITHOLAPAXY;  Surgeon: Shireen Mayo MD;  Location: [facility] OR 2ND FLR;  Service: Urology;  Laterality: N/A;    CYSTOSCOPIC LITHOLAPAXY N/A 9/3/2019    Procedure: CYSTOLITHOLAPAXY;  Surgeon: Shireen Mayo MD;  Location: [facility] OR 2ND FLR;  Service: Urology;  Laterality: N/A;    CYSTOSCOPY N/A 7/13/2021    Procedure: CYSTOSCOPY;  Surgeon: Shireen Mayo MD;  Location: [facility] OR 1ST FLR;  Service: Urology;  Laterality: N/A;    CYSTOSCOPY  11/16/2021    Procedure: CYSTOSCOPY;  Surgeon: Shireen Mayo MD;  Location: [facility] OR 1ST FLR;  Service: Urology;;    ESOPHAGOGASTRODUODENOSCOPY N/A 5/23/2018    Procedure: ESOPHAGOGASTRODUODENOSCOPY (EGD);  Surgeon: Prince Vance MD;  Location: [facility] (4TH FLR);  Service: Endoscopy;  Laterality: N/A;  r/s 'd per Dr. Vance due to family emergency- ER    HYSTERECTOMY  1975    endometriosis    INJECTION OF BOTULINUM TOXIN TYPE A  7/13/2021    Procedure: INJECTION, BOTULINUM TOXIN, 200units;  Surgeon: Shireen Mayo MD;  Location: [facility] OR 1ST FLR;  Service: Urology;;    INJECTION OF BOTULINUM TOXIN TYPE A  11/16/2021    Procedure: INJECTION, BOTULINUM TOXIN, 200units;  Surgeon: Shireen Mayo MD;  Location: [facility] OR 1ST FLR;  Service: Urology;;    pain pump placement      SQ Dilaudid Pump  "managed by Dr. Hillman, Pain Management    REPLACEMENT OF CATHETER N/A 10/31/2019    Procedure: REPLACEMENT, CATHETER-SUPRAPUBIC;  Surgeon: Shireen Mayo MD;  Location: [facility] OR [address] [place] [address];  Service: Urology;  Laterality: N/A;    SPINAL CORD STIMULATOR REMOVAL      before [place]    SPINE SURGERY  5-13-13    CERVICAL FUSION    TONSILLECTOMY         Social History: Patient lives with  at home, has adult dtr.     Prior Intubation HX:  n/a    Modified Barium Swallow: none per EMR    CT:  Within limitations of motion compromised examination, no definite acute intracranial findings are identified.     Chest X-Rays: Evaluation of the visualized portion of the lung bases shows no acute findings.  Right pleural effusion present..  The osseous and soft tissue structures are stable for this patient    Prior diet: unknown, pt asking for coke    Subjective     ST consulted for swallow eval due to concern for aspiration. SLP did speak w/RN re: eval.   Patient goals: "get me some ice for the coke, I do not feel like eating right now."    Pain/Comfort:  · Pain Rating 1:  (denies pain but states she is agitated)    Respiratory Status: NC 2 liters     Objective:   Pt found upright in room, pt is fidgety and restless. She is able to state her name, needs multiple repetitions when questions are asked.   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  Pt with inconsistent complaints at bedside.  Pt appears restless and reports she is agitated.   Pt with minimal participation in swallow trials, stated she is not hungry and why is clinician here.     Oral Musculature Evaluation  · Oral Musculature: facial asymmetry present,right weakness (premorbid CVA noted with residual R sided droop)  · Dentition: present and adequate,upper dentures  · Mucosal Quality: adequate,good  · Mandibular Strength and Mobility:  (fair)  · Oral Labial Strength and Mobility: impaired " coordination (premorbid)  · Lingual Strength and Mobility: impaired strength (premorbid)  · Buccal Strength and Mobility: decreased tone  · Volitional Cough: elicited  · Volitional Swallow: multiple swallows per neck palpation  · Voice Prior to PO Intake: raspy voice is noted  · Oral Musculature Comments: residual R facial droop from prior CVA    Bedside Swallow Eval:   Consistencies Assessed:  · Water by cup  · Coke by cup and straw  · Pudding x1 bite  · Piece of turkey x1 bite only      Oral Phase:   · Slow oral transit time   · Slow mastication  · No oral holding     Pharyngeal Phase:   · no overt clinical signs/symptoms of aspiration  · no overt clinical signs/symptoms of pharyngeal dysphagia  · multiple spontaneous swallows   · No wet voice and no instances of coughing present with PO trials but very limited volume is noted     Compensatory Strategies  · Needs tray assist with meals  · Encourage PO intake  · Boost with meals as able    Treatment: Unable to provide education to pt this date due to confusional state, pt agitated and fixated on moving tray table. NO family at bedside to corroborate hx. Notified MD and RN of diet recs and limited eval due to pt's agitation and confusional state which may be compounded by medication administered while inpatient upon admit.  Secure chat sent to MD and RN s/p session.     Assessment:     Tasha Hawley is a 66 y.o. female admitted with Acute renal failure with tubular necrosis with an SLP diagnosis of limited PO intake per EMR. No outward signs of dysphagia but limited intake noted with SLP.   Will f/u next date.     Goals:   Multidisciplinary Problems     SLP Goals        Problem: SLP Goal    Goal Priority Disciplines Outcome   SLP Goal     SLP Ongoing, Progressing   Description: Short Term Goals:  1. Pt will participate in swallow eval to determine safest diet level.  2. Pt will tolerate current diet renal/regular textures and thin liquids with 80% PO intake, no  dysphagia signs  3. SLP will monitor cognitive status to determine etiology of confusion  Further goals pending patient's progress                   Plan:     · Patient to be seen:  2 x/week,3 x/week   · Plan of Care expires:  03/10/22  · Plan of Care reviewed with:  patient (MD, RN)   · SLP Follow-Up:  Yes       Discharge recommendations:  nursing facility, skilled   Barriers to Discharge:  none    Time Tracking:     SLP Treatment Date:   02/09/22  Speech Start Time:  1400  Speech Stop Time:  1416     Speech Total Time (min):  16 min    Billable Minutes: Eval Swallow and Oral Function 16    02/09/2022

## 2022-02-09 NOTE — PLAN OF CARE
TN spoke with pt's spouse Dangelo and daughter Lisa regarding SNF recommendation per therapy today. Lisa to discuss with pt's spouse and see if patient's mental status improves. TN to follow up with patient's daughter tomorrow.

## 2022-02-09 NOTE — NURSING
Narcan given per Samuels-Willy, DNP order. Pt now awake and alert, but still disoriented X4. Pt does not remember her name. Patient anxious and states that she feels agitated. Pt forgetful and repeats the same questions. Verbalization of feelings encouraged. Comfort and safety maintained. WCTM

## 2022-02-09 NOTE — NURSING
Patient states that she feels very agitated and confused. Pt appears to be sad. She is currently alert and awake. She states that she feels out of it. She states that she does not feel well, but is unable to explain what symptoms she is having. She has been restless through the night. Pt has poor memory, and does not remember her daughter, , or own name. She knows that it is February.  Msg sent to MD. GERMAIN

## 2022-02-09 NOTE — PROGRESS NOTES
Nephrology Progress Note     Consult Requested By: Braydon Martel MD  Reason for Consult: CARITO    SUBJECTIVE:      ?    Review of Systems   Constitutional: Positive for malaise/fatigue. Negative for chills and fever.   HENT: Negative for congestion and sore throat.    Eyes: Negative for blurred vision, double vision and photophobia.   Respiratory: Negative for cough and shortness of breath.    Cardiovascular: Negative for chest pain, palpitations and leg swelling.   Gastrointestinal: Negative for abdominal pain, diarrhea, nausea and vomiting.   Genitourinary: Negative for dysuria and urgency.   Musculoskeletal: Negative for joint pain and myalgias.   Skin: Negative for itching and rash.   Neurological: Positive for weakness. Negative for dizziness, sensory change and headaches.   Endo/Heme/Allergies: Negative for polydipsia. Does not bruise/bleed easily.   Psychiatric/Behavioral: Positive for memory loss. Negative for depression. The patient is nervous/anxious and has insomnia.        Past Medical History:   Diagnosis Date    Anticoagulant long-term use     Anxiety     Arthritis     Bilateral lower extremity edema     severe chronic    Carotid artery occlusion     Cataract     CHF (congestive heart failure)     Coronary artery disease     subtotalled LAD with collateral    Depression     Fever blister     Hypothyroid     Iron deficiency anemia     Lumbar radiculopathy     with chronic pain    Ocular migraine     Renal disorder     Sleep apnea     cpap     Past Surgical History:   Procedure Laterality Date    ADENOIDECTOMY      BACK SURGERY      x 12    CARDIAC CATHETERIZATION  2016    subtotalled LAD with right to left collaterals    CATARACT EXTRACTION W/  INTRAOCULAR LENS IMPLANT Left     Dr Coleman     CYSTOSCOPIC LITHOLAPAXY N/A 6/27/2019    Procedure: CYSTOLITHOLAPAXY;  Surgeon: Shireen Mayo MD;  Location: [facility] OR [address] [place] [address];  Service: Urology;  Laterality: N/A;    CYSTOSCOPIC  LITHOLAPAXY N/A 9/3/2019    Procedure: CYSTOLITHOLAPAXY;  Surgeon: Shireen Mayo MD;  Location: [facility] OR 2ND FLR;  Service: Urology;  Laterality: N/A;    CYSTOSCOPY N/A 7/13/2021    Procedure: CYSTOSCOPY;  Surgeon: Shireen Mayo MD;  Location: [facility] OR 1ST FLR;  Service: Urology;  Laterality: N/A;    CYSTOSCOPY  11/16/2021    Procedure: CYSTOSCOPY;  Surgeon: Shireen Mayo MD;  Location: [facility] OR 1ST FLR;  Service: Urology;;    ESOPHAGOGASTRODUODENOSCOPY N/A 5/23/2018    Procedure: ESOPHAGOGASTRODUODENOSCOPY (EGD);  Surgeon: Prince Vance MD;  Location: [facility] (4TH FLR);  Service: Endoscopy;  Laterality: N/A;  r/s 'd per Dr. Vance due to family emergency- ER    HYSTERECTOMY  1975    endometriosis    INJECTION OF BOTULINUM TOXIN TYPE A  7/13/2021    Procedure: INJECTION, BOTULINUM TOXIN, 200units;  Surgeon: Shireen Mayo MD;  Location: [facility] OR 1ST FLR;  Service: Urology;;    INJECTION OF BOTULINUM TOXIN TYPE A  11/16/2021    Procedure: INJECTION, BOTULINUM TOXIN, 200units;  Surgeon: Shireen Mayo MD;  Location: [facility] OR 1ST FLR;  Service: Urology;;    pain pump placement      SQ Dilaudid Pump managed by Dr. Hillman, Pain Management    REPLACEMENT OF CATHETER N/A 10/31/2019    Procedure: REPLACEMENT, CATHETER-SUPRAPUBIC;  Surgeon: Shireen Mayo MD;  [facility];  Service: Urology;  Laterality: N/A;    SPINAL CORD STIMULATOR REMOVAL      before [place]    SPINE SURGERY  5-13-13    CERVICAL FUSION    TONSILLECTOMY       Family History   Problem Relation Age of Onset    Cancer Mother 55        breast    Cancer Father         esophagus,had laryngectomy    Esophageal cancer Father     Parkinsonism Maternal Grandmother     Tremor Maternal Grandmother     No Known Problems Brother     No Known Problems Brother     Heart disease Maternal Uncle     Colon cancer Maternal Uncle         Less than 60    No Known Problems Sister     No Known Problems Maternal Aunt      Cirrhosis Paternal Aunt         ETOH    Liver disease Paternal Aunt         ETOH    Liver disease Paternal Uncle         ETOH    Cirrhosis Paternal Uncle         ETOH    No Known Problems Maternal Grandfather     No Known Problems Paternal Grandmother     No Known Problems Paternal Grandfather     Melanoma Neg Hx     Amblyopia Neg Hx     Blindness Neg Hx     Cataracts Neg Hx     Diabetes Neg Hx     Glaucoma Neg Hx     Hypertension Neg Hx     Macular degeneration Neg Hx     Retinal detachment Neg Hx     Strabismus Neg Hx     Stroke Neg Hx     Thyroid disease Neg Hx     Celiac disease Neg Hx     Colon polyps Neg Hx     Cystic fibrosis Neg Hx     Crohn's disease Neg Hx     Inflammatory bowel disease Neg Hx     Liver cancer Neg Hx     Rectal cancer Neg Hx     Stomach cancer Neg Hx     Ulcerative colitis Neg Hx     Lymphoma Neg Hx      Social History     Tobacco Use    Smoking status: Never Smoker    Smokeless tobacco: Never Used   Substance Use Topics    Alcohol use: Never    Drug use: No       Review of patient's allergies indicates:   Allergen Reactions    (d)-limonene flavor      Other reaction(s): difficult intubation  Other reaction(s): Difficulty breathing    Bactrim [sulfamethoxazole-trimethoprim] Anaphylaxis    Benadryl [diphenhydramine hcl] Shortness Of Breath    Fentanyl Itching, Nausea And Vomiting and Swelling             Imitrex [sumatriptan succinate] Shortness Of Breath    Topamax [topiramate] Shortness Of Breath    Vancomycin Shortness Of Breath     Rash    Butorphanol tartrate     Darvocet a500 [propoxyphene n-acetaminophen]      Other reaction(s): Difficulty breathing    White petrolatum-zinc     Zinc oxide-white petrolatum      Other reaction(s): Difficulty breathing    Latex, natural rubber Itching and Rash    Phenytoin Rash and Other (See Comments)     Trouble breathing            OBJECTIVE:     Vital Signs (Most Recent)  Vitals:    02/09/22 0900  02/09/22 1019 02/09/22 1155 02/09/22 1206   BP:  (!) 95/47     BP Location:       Patient Position:  Lying     Pulse:  (!) 111  (!) 48   Resp:  19     Temp:  98.1 °F (36.7 °C)     TempSrc:  Oral     SpO2: 97% 97% 96%    Weight:       Height:             Date 02/09/22 0700 - 02/10/22 0659   Shift 8544-1707 6751-1117 9591-3290 24 Hour Total   INTAKE   Shift Total(mL/kg)       OUTPUT   Urine(mL/kg/hr) 600   600   Shift Total(mL/kg) 600(6.3)   600(6.3)   Weight (kg) 94.5 94.5 94.5 94.5           Medications:   aspirin  81 mg Oral Daily    atorvastatin  80 mg Oral Daily    ceFEPime (MAXIPIME) IVPB  1 g Intravenous Q24H    FLUoxetine  60 mg Oral Daily    heparin (porcine)  5,000 Units Subcutaneous Q8H    levothyroxine  125 mcg Oral Before breakfast    mupirocin   Nasal BID    oxybutynin  15 mg Oral Daily    pantoprazole  40 mg Oral Daily    polyethylene glycol  17 g Oral Daily    QUEtiapine  100 mg Oral Nightly    senna  2 tablet Oral BID    sevelamer carbonate  800 mg Oral TID WM    traZODone  200 mg Oral QHS           Physical Exam  Vitals and nursing note reviewed.   Constitutional:       General: She is not in acute distress.     Appearance: She is obese. She is ill-appearing. She is not diaphoretic.   HENT:      Head: Normocephalic and atraumatic.      Mouth/Throat:      Pharynx: No oropharyngeal exudate.   Eyes:      General: No scleral icterus.     Conjunctiva/sclera: Conjunctivae normal.      Pupils: Pupils are equal, round, and reactive to light.   Cardiovascular:      Rate and Rhythm: Normal rate and regular rhythm.      Heart sounds: Normal heart sounds. No murmur heard.      Pulmonary:      Effort: Pulmonary effort is normal. No respiratory distress.      Breath sounds: Normal breath sounds.   Abdominal:      General: Bowel sounds are normal. There is no distension.      Palpations: Abdomen is soft.      Tenderness: There is no abdominal tenderness.   Genitourinary:     Comments: Suprapubic  catheter   Musculoskeletal:         General: Normal range of motion.      Cervical back: Normal range of motion and neck supple.   Skin:     General: Skin is warm and dry.      Findings: No erythema.   Neurological:      Mental Status: She is alert and oriented to person, place, and time.      Cranial Nerves: No cranial nerve deficit.   Psychiatric:         Mood and Affect: Affect normal.         Cognition and Memory: Memory normal.         Judgment: Judgment normal.         Laboratory:  Recent Labs   Lab 02/05/22  0308 02/05/22  0308 02/05/22  0527 02/09/22  1124   WBC 5.42  --  5.61 5.48   HGB 11.2*  --  11.3* 10.3*   HCT 33.7*  --  34.9* 34.2*     --  175 177   MONO 12.0  0.7   < > 10.9  0.6 9.1  0.5    < > = values in this interval not displayed.     Recent Labs   Lab 02/06/22  0506 02/06/22  0506 02/07/22  0557 02/08/22  0623 02/09/22  1124      < > 140 136 139   K 3.9   < > 4.2 4.2 4.4   CL 97   < > 99 98 100   CO2 27   < > 24 28 27   BUN 24*   < > 23 21 18   CREATININE 7.2*   < > 6.4* 5.2* 3.7*   CALCIUM 8.0*   < > 8.1* 8.8 9.4   PHOS 5.5*  --  5.3* 4.4  --     < > = values in this interval not displayed.       Diagnostic Results:  X-Ray: Reviewed  US: Reviewed  Echo: Reviewed  ASSESSMENT/PLAN:     1. CARITO/Acidosis/Hyponatremia   - as of now ATN due to UTI, no obstruction,  US/CT kidney noncontributory   -- Cr  up 7.2-7.4 no uremia symptoms however hard to assess unknown patient baseline mental and functional. Now Improving  Treat UTI now on both fluconazole and Ceftriaxone Cr improving 6.4=>5.2=>3.7 today      2. HTN (I10) - at range   3. Anemia    Recent Labs   Lab 02/05/22  0308 02/05/22  0527 02/09/22  1124   HGB 11.2* 11.3* 10.3*   HCT 33.7* 34.9* 34.2*    175 177       Iron   Lab Results   Component Value Date    IRON 92 06/10/2021    TIBC 330 06/10/2021    FERRITIN 121 06/10/2021       4. MBD (E88.9 M90.80) -  Recent Labs   Lab 02/08/22  0623 02/08/22  0623 02/09/22  1124    CALCIUM 8.8   < > 9.4   PHOS 4.4  --   --     < > = values in this interval not displayed.     Recent Labs   Lab 02/06/22  0506 02/07/22  0557 02/09/22  1124   MG 1.9 1.9 1.9   Sevelamer     Lab Results   Component Value Date    CALCIUM 9.4 02/09/2022    PHOS 4.4 02/08/2022     Lab Results   Component Value Date    FLZGVONO94HA 18 (L) 10/19/2020   calcitriol     Lab Results   Component Value Date    CO2 27 02/09/2022       5. Acidosis  - stop  PO bicarb   6. Nutrition/Hypoalbuminemia (E88.09) -   Recent Labs   Lab 02/03/22  1328 02/08/22  0623   ALBUMIN 3.9 3.0*     Nepro with meals TID. Renal vitamins daily      Thank you for the consult, will follow  With any question please call [phone]  Fernando Booker MD    [facility]  NIMA Wheeler MD, FACP,   MSadiq Pabon MD,   MD ELIS Gutierrez MD E. V. Harmon, NP    [address]  [place], [address] [phone]

## 2022-02-09 NOTE — PLAN OF CARE
Problem: Occupational Therapy Goal  Goal: Occupational Therapy Goal  Description: Goals to be met by: 3/4/22     Patient will increase functional independence with ADLs by performing:    LE Dressing with Minimal Assistance.  Grooming while standing with Supervision.  Toileting from toilet with Supervision for hygiene and clothing management.   Supine to sit with Supervision.  Step transfer with Supervision  Toilet transfer to toilet with Supervision.  Increased functional strength to WFL for self care skills and functional mobility.  Upper extremity exercise program x10 reps per handout, with independence.    Outcome: Ongoing, Progressing     Pt with increased confusion this date, requires increased assist for axs and functional mobility, and demonstrates uncontrolled muscle jerks/body tremors while seated and standing. Currently demonstrating impairments that will require update recs to SNF placement

## 2022-02-09 NOTE — SUBJECTIVE & OBJECTIVE
Interval History: Kidney function continues to improve. Good urine output. Pt has no complaints. Denies fever/chills.     Review of Systems   Constitutional: Negative for appetite change and fever.   Respiratory: Negative for shortness of breath.    Cardiovascular: Negative for chest pain.   Neurological: Positive for weakness.   Psychiatric/Behavioral: Negative for sleep disturbance.     Objective:     Vital Signs (Most Recent):  Temp: 97.5 °F (36.4 °C) (02/08/22 2019)  Pulse: (!) 48 (02/08/22 2019)  Resp: 20 (02/08/22 2019)  BP: (!) 108/59 (02/08/22 2019)  SpO2: (!) 94 % (02/08/22 1940) Vital Signs (24h Range):  Temp:  [97.5 °F (36.4 °C)-98.3 °F (36.8 °C)] 97.5 °F (36.4 °C)  Pulse:  [46-58] 48  Resp:  [16-20] 20  SpO2:  [93 %-99 %] 94 %  BP: ()/(50-82) 108/59     Weight: 93.2 kg (205 lb 7.5 oz)  Body mass index is 35.27 kg/m².    Intake/Output Summary (Last 24 hours) at 2/8/2022 2039  Last data filed at 2/8/2022 1726  Gross per 24 hour   Intake 390.92 ml   Output 2800 ml   Net -2409.08 ml      Physical Exam  Vitals and nursing note reviewed.   Constitutional:       General: She is not in acute distress.     Appearance: She is ill-appearing.   HENT:      Head: Normocephalic and atraumatic.      Nose: Nose normal.      Mouth/Throat:      Mouth: Mucous membranes are moist.   Eyes:      Pupils: Pupils are equal, round, and reactive to light.   Cardiovascular:      Rate and Rhythm: Normal rate and regular rhythm.      Pulses: Normal pulses.      Heart sounds: Normal heart sounds.   Pulmonary:      Effort: Pulmonary effort is normal.      Breath sounds: Normal breath sounds.   Abdominal:      General: Bowel sounds are normal.      Palpations: Abdomen is soft.      Comments: Suprapubic catheter   Musculoskeletal:         General: Tenderness present. Normal range of motion.      Cervical back: Normal range of motion.      Right lower leg: Edema present.      Left lower leg: Edema present.   Skin:     General: Skin  is warm and dry.   Neurological:      Mental Status: She is alert and oriented to person, place, and time.      Motor: Weakness present.      Comments: R facial droop, chronic per patient   Psychiatric:         Behavior: Behavior normal.         Significant Labs: All pertinent labs within the past 24 hours have been reviewed.    Significant Imaging: I have reviewed all pertinent imaging results/findings within the past 24 hours.

## 2022-02-09 NOTE — ASSESSMENT & PLAN NOTE
BUN 22. Creatinine 7.2, baseline 1.2  Reports poor PO intake, N/V, decreased urine output  Catheter draining  Given 2.5L IV fluids in ED  -initially treated with IV fluids without improvement; will stop  -consult nephrology for eval  -hold torsemide  -renally dose meds  -repeat BMP daily  -cont to improve. Good urine output.

## 2022-02-09 NOTE — CARE UPDATE
Called to bedside by bedside nurse. Nurse is concern of patient's mental status, per nurse, patient is more confused. Patient had a confused episode yesterday but patient return back to baseline. Today, patient received Norco and hydroxyzine this morning 0845 and per nruse has been confused all day.   On my exam, patient is unable to tell me her name or date of birth. She is inattentive; attempting to clean her bedside table multiple times. She is able to follow simple commands. Her speech is delayed and she has difficulty completing full sentences.       Assessment and plan:    Acute encephalopathy  -may be due to Norco and hydroxyzine that was given this morning  -will give Narcan x1 and obtain Head CT stat  -avoid narcotics and anti-cholinergics for now  -Reassess in am  -Will continue to monitor        Adriana Garcia DNP, ACNPC-AG, CCRN  Hospitalist  Department of Hospital Medicine  [facility]   [phone]

## 2022-02-09 NOTE — PT/OT/SLP PROGRESS
Physical Therapy Treatment    Patient Name:  Tasha Hawley   MRN:  [id number]    Recommendations:     Discharge Recommendations:  nursing facility, skilled (spoke with evaluating PT concerning pt disposition)   Discharge Equipment Recommendations:  (TBD)   Barriers to discharge: decreased mobility,endurance and strength    Assessment:     Tasha Hawley is a 66 y.o. female admitted with a medical diagnosis of Acute renal failure with tubular necrosis.  She presents with the following impairments/functional limitations:  weakness,impaired endurance,impaired functional mobilty,gait instability,impaired balance,decreased upper extremity function,decreased lower extremity function,impaired cognition,decreased ROM,impaired coordination,impaired cardiopulmonary response to activity,pt with slow reaction time and lethargic,as well as impaired speech,spoke with evaluating PT to change disposition from  to SNF upon discharge,nsg is aware of pt's status.    Rehab Prognosis: Fair; patient would benefit from acute skilled PT services to address these deficits and reach maximum level of function.    Recent Surgery: * No surgery found *      Plan:     During this hospitalization, patient to be seen 5 x/week to address the identified rehab impairments via gait training,therapeutic exercises,therapeutic activities,neuromuscular re-education and progress toward the following goals:    · Plan of Care Expires:  03/04/22    Subjective     Chief Complaint: n/a  Patient/Family Comments/goals: pt is not hungry.  Pain/Comfort:  · Pain Rating 1:  (no c/o's)      Objective:     Communicated with nsg prior to session.  Patient found supine with bed alarm,oxygen,peripheral IV upon PT entry to room.     General Precautions: Standard, fall,hearing impaired,respiratory   Orthopedic Precautions:N/A   Braces: N/A  Respiratory Status: Nasal cannula, flow 2 L/min     Functional Mobility:  · Gait: n/a      AM-PAC 6 CLICK MOBILITY  Turning over  in bed (including adjusting bedclothes, sheets and blankets)?: 2  Sitting down on and standing up from a chair with arms (e.g., wheelchair, bedside commode, etc.): 2  Moving from lying on back to sitting on the side of the bed?: 2  Moving to and from a bed to a chair (including a wheelchair)?: 2  Need to walk in hospital room?: 1  Climbing 3-5 steps with a railing?: 1  Basic Mobility Total Score: 10       Therapeutic Activities and Exercises: le AA supine ex's X 10-12 reps inc: ap,hs,abd/add,slr with increased verbal/tactile cues,bed ex's only per nsg.       Patient left supine with all lines intact, call button in reach, bed alarm on and nsg present..    GOALS: see general POC  Multidisciplinary Problems     Physical Therapy Goals        Problem: Physical Therapy Goal    Goal Priority Disciplines Outcome Goal Variances Interventions   Physical Therapy Goal     PT, PT/OT Ongoing, Progressing     Description: Goals to be met by: 3/4/22     Patient will increase functional independence with mobility by performin. Supine <> sit with supervision  2. Sit to stand transfer with supervision  3. Bed to chair transfer with Supervision using Rolling Walker  4. Gait  x 50 feet with Supervision using Rolling Walker.   5. Lower extremity exercise program x 10 reps per handout, with supervision                     Time Tracking:     PT Received On: 22  PT Start Time:      PT Stop Time: 1433  PT Total Time (min): 12 min     Billable Minutes: Therapeutic Exercise 12    Treatment Type: Treatment  PT/PTA: PTA     PTA Visit Number: 4     2022

## 2022-02-09 NOTE — NURSING
Reviewed patients medication list and orders. Pt has intrathecal pain pump in home medication list.     Disp Refills Start End MARGOT   intrathecal pain pump compound     --   Sig: Hydromorphone (7.5 mg/mL) infusion at 3.6799 mg/day (0.1533 mg/hr) out of a total reservoir volume of 37.3 mL   Pump filled every 2 months   Class: Historical Med   Order: [id number]   Date/Time Signed: 1/28/2020 09:59         Called  for further questioning and he states that she has this in her right hip.  He states that it was refilled two days prior to admit and and the dose was recently increased but he does not know the dose. TRU Garcia notified. No new orders. WCTM.

## 2022-02-10 LAB
ANION GAP SERPL CALC-SCNC: 12 MMOL/L (ref 8–16)
BUN SERPL-MCNC: 15 MG/DL (ref 8–23)
CALCIUM SERPL-MCNC: 9.4 MG/DL (ref 8.7–10.5)
CHLORIDE SERPL-SCNC: 100 MMOL/L (ref 95–110)
CO2 SERPL-SCNC: 27 MMOL/L (ref 23–29)
CREAT SERPL-MCNC: 2.7 MG/DL (ref 0.5–1.4)
EST. GFR  (AFRICAN AMERICAN): 21 ML/MIN/1.73 M^2
EST. GFR  (NON AFRICAN AMERICAN): 18 ML/MIN/1.73 M^2
GLUCOSE SERPL-MCNC: 100 MG/DL (ref 70–110)
POTASSIUM SERPL-SCNC: 5.4 MMOL/L (ref 3.5–5.1)
SODIUM SERPL-SCNC: 139 MMOL/L (ref 136–145)

## 2022-02-10 PROCEDURE — 80048 BASIC METABOLIC PNL TOTAL CA: CPT | Mod: HCNC | Performed by: INTERNAL MEDICINE

## 2022-02-10 PROCEDURE — 94761 N-INVAS EAR/PLS OXIMETRY MLT: CPT | Mod: HCNC

## 2022-02-10 PROCEDURE — 97110 THERAPEUTIC EXERCISES: CPT | Mod: HCNC

## 2022-02-10 PROCEDURE — 25000003 PHARM REV CODE 250: Mod: HCNC | Performed by: STUDENT IN AN ORGANIZED HEALTH CARE EDUCATION/TRAINING PROGRAM

## 2022-02-10 PROCEDURE — 97535 SELF CARE MNGMENT TRAINING: CPT | Mod: HCNC

## 2022-02-10 PROCEDURE — 25000003 PHARM REV CODE 250: Mod: HCNC | Performed by: INTERNAL MEDICINE

## 2022-02-10 PROCEDURE — 21400001 HC TELEMETRY ROOM: Mod: HCNC

## 2022-02-10 PROCEDURE — 99900035 HC TECH TIME PER 15 MIN (STAT): Mod: HCNC

## 2022-02-10 PROCEDURE — 86580 TB INTRADERMAL TEST: CPT | Mod: HCNC | Performed by: INTERNAL MEDICINE

## 2022-02-10 PROCEDURE — 30200315 PPD INTRADERMAL TEST REV CODE 302: Mod: HCNC | Performed by: INTERNAL MEDICINE

## 2022-02-10 PROCEDURE — 94660 CPAP INITIATION&MGMT: CPT | Mod: HCNC

## 2022-02-10 PROCEDURE — 92526 ORAL FUNCTION THERAPY: CPT | Mod: HCNC

## 2022-02-10 PROCEDURE — 94799 UNLISTED PULMONARY SVC/PX: CPT | Mod: HCNC

## 2022-02-10 PROCEDURE — 97530 THERAPEUTIC ACTIVITIES: CPT | Mod: HCNC

## 2022-02-10 PROCEDURE — 27000221 HC OXYGEN, UP TO 24 HOURS: Mod: HCNC

## 2022-02-10 PROCEDURE — 63600175 PHARM REV CODE 636 W HCPCS: Mod: HCNC | Performed by: NURSE PRACTITIONER

## 2022-02-10 PROCEDURE — 36415 COLL VENOUS BLD VENIPUNCTURE: CPT | Mod: HCNC | Performed by: INTERNAL MEDICINE

## 2022-02-10 PROCEDURE — 25000003 PHARM REV CODE 250: Mod: HCNC | Performed by: NURSE PRACTITIONER

## 2022-02-10 RX ORDER — TRAZODONE HYDROCHLORIDE 50 MG/1
50 TABLET ORAL NIGHTLY PRN
Status: DISCONTINUED | OUTPATIENT
Start: 2022-02-10 | End: 2022-02-15

## 2022-02-10 RX ORDER — QUETIAPINE FUMARATE 25 MG/1
50 TABLET, FILM COATED ORAL NIGHTLY
Status: DISCONTINUED | OUTPATIENT
Start: 2022-02-10 | End: 2022-02-12

## 2022-02-10 RX ADMIN — POLYETHYLENE GLYCOL 3350 17 G: 17 POWDER, FOR SOLUTION ORAL at 08:02

## 2022-02-10 RX ADMIN — HEPARIN SODIUM 5000 UNITS: 5000 INJECTION INTRAVENOUS; SUBCUTANEOUS at 02:02

## 2022-02-10 RX ADMIN — PANTOPRAZOLE SODIUM 40 MG: 40 TABLET, DELAYED RELEASE ORAL at 08:02

## 2022-02-10 RX ADMIN — STANDARDIZED SENNA CONCENTRATE 2 TABLET: 8.6 TABLET ORAL at 08:02

## 2022-02-10 RX ADMIN — QUETIAPINE FUMARATE 50 MG: 25 TABLET ORAL at 08:02

## 2022-02-10 RX ADMIN — SEVELAMER CARBONATE 800 MG: 800 TABLET, FILM COATED ORAL at 12:02

## 2022-02-10 RX ADMIN — HEPARIN SODIUM 5000 UNITS: 5000 INJECTION INTRAVENOUS; SUBCUTANEOUS at 10:02

## 2022-02-10 RX ADMIN — FLUOXETINE HYDROCHLORIDE 60 MG: 20 CAPSULE ORAL at 08:02

## 2022-02-10 RX ADMIN — LEVOTHYROXINE SODIUM 125 MCG: 0.03 TABLET ORAL at 05:02

## 2022-02-10 RX ADMIN — TUBERCULIN PURIFIED PROTEIN DERIVATIVE 5 UNITS: 5 INJECTION, SOLUTION INTRADERMAL at 03:02

## 2022-02-10 RX ADMIN — ASPIRIN 81 MG: 81 TABLET, COATED ORAL at 12:02

## 2022-02-10 RX ADMIN — SEVELAMER CARBONATE 800 MG: 800 TABLET, FILM COATED ORAL at 05:02

## 2022-02-10 RX ADMIN — OXYBUTYNIN CHLORIDE 15 MG: 5 TABLET, EXTENDED RELEASE ORAL at 08:02

## 2022-02-10 RX ADMIN — SEVELAMER CARBONATE 800 MG: 800 TABLET, FILM COATED ORAL at 08:02

## 2022-02-10 RX ADMIN — ATORVASTATIN CALCIUM 80 MG: 40 TABLET, FILM COATED ORAL at 08:02

## 2022-02-10 RX ADMIN — MUPIROCIN: 20 OINTMENT TOPICAL at 08:02

## 2022-02-10 RX ADMIN — HEPARIN SODIUM 5000 UNITS: 5000 INJECTION INTRAVENOUS; SUBCUTANEOUS at 05:02

## 2022-02-10 NOTE — PT/OT/SLP PROGRESS
"Physical Therapy Treatment    Patient Name:  Tasha Hawley   MRN:  [id number]    Recommendations:     Discharge Recommendations:  nursing facility, skilled   Discharge Equipment Recommendations:  (Defer to SNF)   Barriers to discharge: increased assist needed    Assessment:     Tasha Hawley is a 66 y.o. female admitted with a medical diagnosis of Acute renal failure with tubular necrosis.  She presents with the following impairments/functional limitations:  weakness,impaired functional mobilty,impaired endurance,gait instability,impaired balance,impaired self care skills,decreased lower extremity function,decreased upper extremity function,pain,decreased coordination,impaired cognition,decreased safety awareness,impaired coordination,impaired cardiopulmonary response to activity .Pt reporting dizziness this session and increased in sitting/standing. Pt requires Maureen for transfers and side steps R toward HOB with RW. No body jerking observed this date, only dizziness. Pt is impulsive at times and easily distracted. Mod cueing to stay on task and for sequencing side steps. Pt returned supine 2/2 dizziness and therex completed. Recommending SNF placement upon d/c.     Rehab Prognosis: Good; patient would benefit from acute skilled PT services to address these deficits and reach maximum level of function.    Recent Surgery: * No surgery found *      Plan:     During this hospitalization, patient to be seen 5 x/week to address the identified rehab impairments via gait training,therapeutic activities,therapeutic exercises,neuromuscular re-education and progress toward the following goals:    · Plan of Care Expires:  03/04/22    Subjective     Chief Complaint: Dizziness and LBP  Patient/Family Comments/goals: None stated  Pain/Comfort:  · Pain Rating 1: 8/10 (but pt also reporting "it's not that bad")  · Location - Orientation 1: lower  · Location 1: back  · Pain Addressed 1: Reposition,Distraction  · Pain Rating " Post-Intervention 1:  (did not rate)      Objective:     Communicated with nurse prior to session.  Patient found HOB elevated with bed alarm,oxygen,peripheral IV,meadows catheter upon PT entry to room.     General Precautions: Standard, fall,hearing impaired,respiratory   Orthopedic Precautions:N/A   Braces: N/A  Respiratory Status: Nasal cannula, flow 2 L/min     Functional Mobility:  · Bed Mobility:     · Rolling Right: minimum assistance  · Scooting: minimum assistance  · Supine to Sit: minimum assistance  · Sit to Supine: minimum assistance  · Transfers:     · Sit to Stand:  minimum assistance with rolling walker  · Gait: Pt took ~6 side steps R toward HOB with RW and Maureen. Mod VC's for sequencing of task.      AM-PAC 6 CLICK MOBILITY  Turning over in bed (including adjusting bedclothes, sheets and blankets)?: 3  Sitting down on and standing up from a chair with arms (e.g., wheelchair, bedside commode, etc.): 3  Moving from lying on back to sitting on the side of the bed?: 3  Moving to and from a bed to a chair (including a wheelchair)?: 3  Need to walk in hospital room?: 1  Climbing 3-5 steps with a railing?: 1  Basic Mobility Total Score: 14       Therapeutic Activities and Exercises:   Pt sat EOB and reporting dizziness.   X 10 B APs performed seated - difficulty keeping pt on task.  Performed sit > stand and side steps with RW and pt reporting increased dizziness.  Returned supine.  Supine therex B LE: X 10-15 reps heel slides, SAQs, APs, pillow squeezes, GS, and hip abduction/adduction.     Patient left HOB elevated with all lines intact, call button in reach, bed alarm on, nsg notified and dtr present..    GOALS:   Multidisciplinary Problems     Physical Therapy Goals        Problem: Physical Therapy Goal    Goal Priority Disciplines Outcome Goal Variances Interventions   Physical Therapy Goal     PT, PT/OT Ongoing, Progressing     Description: Goals to be met by: 3/4/22     Patient will increase functional  independence with mobility by performin. Supine <> sit with supervision  2. Sit to stand transfer with supervision  3. Bed to chair transfer with Supervision using Rolling Walker  4. Gait  x 50 feet with Supervision using Rolling Walker.   5. Lower extremity exercise program x 10 reps per handout, with supervision                     Time Tracking:     PT Received On: 02/10/22  PT Start Time: 1119     PT Stop Time: 1142  PT Total Time (min): 23 min     Billable Minutes: Therapeutic Activity 13 and Therapeutic Exercise 10     Treatment Type: Treatment  PT/PTA: PT     PTA Visit Number: 0     02/10/2022

## 2022-02-10 NOTE — ASSESSMENT & PLAN NOTE
BUN 22. Creatinine 7.2, baseline 1.2  Reports poor PO intake, N/V, decreased urine output  Catheter draining  Given 2.5L IV fluids in ED  -initially treated with IV fluids without improvement; will stop  -consult nephrology for eval  -hold torsemide  -renally dose meds  -repeat BMP daily  -cont to improve. Good urine output.  -discharge planning to SNF. CM on board.

## 2022-02-10 NOTE — NURSING
Patient's pain clinic contacted and was able to fax patient's current Dilaudid pump setting of 0.2946/hr, the copy is left inside the pt's chart. Dr Delonte isaac.

## 2022-02-10 NOTE — SUBJECTIVE & OBJECTIVE
Interval History: Pt lethargic today. Frequently falls asleep during evaluation. Responds appropriately to questions. No neuro deficits. Received high dose of trazodone/seroquel last night. Kidney function continues to improve. Good urine output.    Review of Systems   Constitutional: Negative for appetite change and fever.   Respiratory: Negative for shortness of breath.    Cardiovascular: Negative for chest pain.   Neurological: Positive for weakness.   Psychiatric/Behavioral: Negative for sleep disturbance.     Objective:     Vital Signs (Most Recent):  Temp: 98.5 °F (36.9 °C) (02/10/22 1059)  Pulse: 61 (02/10/22 1200)  Resp: 18 (02/10/22 1059)  BP: (!) 97/55 (02/10/22 1059)  SpO2: 96 % (02/10/22 1059) Vital Signs (24h Range):  Temp:  [97.4 °F (36.3 °C)-98.7 °F (37.1 °C)] 98.5 °F (36.9 °C)  Pulse:  [40-61] 61  Resp:  [16-19] 18  SpO2:  [86 %-100 %] 96 %  BP: ()/(51-62) 97/55     Weight: 98 kg (216 lb 0.8 oz)  Body mass index is 37.09 kg/m².    Intake/Output Summary (Last 24 hours) at 2/10/2022 1506  Last data filed at 2/10/2022 1200  Gross per 24 hour   Intake 1361.91 ml   Output 2000 ml   Net -638.09 ml      Physical Exam  Vitals and nursing note reviewed.   Constitutional:       General: She is not in acute distress.     Appearance: She is ill-appearing.   HENT:      Head: Normocephalic and atraumatic.      Nose: Nose normal.      Mouth/Throat:      Mouth: Mucous membranes are moist.   Eyes:      Pupils: Pupils are equal, round, and reactive to light.   Cardiovascular:      Rate and Rhythm: Normal rate and regular rhythm.      Pulses: Normal pulses.      Heart sounds: Normal heart sounds.   Pulmonary:      Effort: Pulmonary effort is normal.      Breath sounds: Normal breath sounds.   Abdominal:      General: Bowel sounds are normal.      Palpations: Abdomen is soft.      Comments: Suprapubic catheter   Musculoskeletal:         General: Tenderness present. Normal range of motion.      Cervical back:  Normal range of motion.      Right lower leg: Edema present.      Left lower leg: Edema present.   Skin:     General: Skin is warm and dry.   Neurological:      Mental Status: She is alert and oriented to person, place, and time.      Motor: Weakness present.      Comments: R facial droop, chronic per patient   Psychiatric:         Behavior: Behavior normal.         Significant Labs: All pertinent labs within the past 24 hours have been reviewed.    Significant Imaging: I have reviewed all pertinent imaging results/findings within the past 24 hours.

## 2022-02-10 NOTE — PLAN OF CARE
King called [facility] at [phone]. King spoke with Cami and completed LOCET. King faxed PASRR and awaiting 142 from state.        02/10/22 5210   Post-Acute Status   Post-Acute Authorization Placement   Post-Acute Placement Status Pending [facility] direction/certification   Discharge Plan   Discharge Plan A Skilled Nursing Facility

## 2022-02-10 NOTE — PROGRESS NOTES
[facility] Medicine  Progress Note    Patient Name: Tasha Hawley  MRN: [id number]  Patient Class: IP- Inpatient   Admission Date: 2/3/2022  Length of Stay: 7 days  Attending Physician: Braydon Martel MD  Primary Care Provider: Mesfin Hodges Ii, MD        Subjective:     Principal Problem:Acute renal failure with tubular necrosis        HPI:  Tasha Hawley is a 67 yo female with a pmh of neurogenic bladder, suprapubic catheter,recurrent UTI, chronic pain with epidural pain pump, CAD, hypothyroidism, sleep apnea. She c/o malaise, poor PO intake, N/V, chills, decreased urine output, weakness, chronic pain. She was seen by ID today and noted to have cloudy urine. She just finished a course of cipro and has recurrent UTIs. She states she has not been feeling well for a few weeks. She feels as though she is leaking urine from her urethra. She has home health with [facility] and states they changed her catheter 2 days ago (no record on chart) and she is not sure if a urine sample was obtained at that time. She has chronic swelling to her lower legs and walks with a walker. She fell a few days ago with no injury.       Overview/Hospital Course:  No notes on file    Interval History: Mental status much improved today, back to baseline - lethargy likely medication induced. Daughter at bedside states pain pump dose also recently increased. Kidney function continues to improve. Pt agreeable for discharge to SNF.    Review of Systems   Constitutional: Negative for appetite change and fever.   Respiratory: Negative for shortness of breath.    Cardiovascular: Negative for chest pain.   Neurological: Positive for weakness.   Psychiatric/Behavioral: Negative for sleep disturbance.     Objective:     Vital Signs (Most Recent):  Temp: 98.5 °F (36.9 °C) (02/10/22 1059)  Pulse: 61 (02/10/22 1200)  Resp: 18 (02/10/22 1059)  BP: (!) 97/55 (02/10/22 1059)  SpO2: 96 % (02/10/22 1059) Vital Signs (24h Range):  Temp:   [97.4 °F (36.3 °C)-98.7 °F (37.1 °C)] 98.5 °F (36.9 °C)  Pulse:  [40-61] 61  Resp:  [16-19] 18  SpO2:  [86 %-100 %] 96 %  BP: ()/(51-62) 97/55     Weight: 98 kg (216 lb 0.8 oz)  Body mass index is 37.09 kg/m².    Intake/Output Summary (Last 24 hours) at 2/10/2022 1512  Last data filed at 2/10/2022 1200  Gross per 24 hour   Intake 1361.91 ml   Output 2000 ml   Net -638.09 ml      Physical Exam  Vitals and nursing note reviewed.   Constitutional:       General: She is not in acute distress.     Appearance: She is ill-appearing.   HENT:      Head: Normocephalic and atraumatic.      Nose: Nose normal.      Mouth/Throat:      Mouth: Mucous membranes are moist.   Eyes:      Pupils: Pupils are equal, round, and reactive to light.   Cardiovascular:      Rate and Rhythm: Normal rate and regular rhythm.      Pulses: Normal pulses.      Heart sounds: Normal heart sounds.   Pulmonary:      Effort: Pulmonary effort is normal.      Breath sounds: Normal breath sounds.   Abdominal:      General: Bowel sounds are normal.      Palpations: Abdomen is soft.      Comments: Suprapubic catheter   Musculoskeletal:         General: Tenderness present. Normal range of motion.      Cervical back: Normal range of motion.      Right lower leg: Edema present.      Left lower leg: Edema present.   Skin:     General: Skin is warm and dry.   Neurological:      Mental Status: She is alert and oriented to person, place, and time.      Motor: Weakness present.      Comments: R facial droop, chronic per patient   Psychiatric:         Behavior: Behavior normal.         Significant Labs: All pertinent labs within the past 24 hours have been reviewed.    Significant Imaging: I have reviewed all pertinent imaging results/findings within the past 24 hours.      Assessment/Plan:      * Acute renal failure with tubular necrosis  BUN 22. Creatinine 7.2, baseline 1.2  Reports poor PO intake, N/V, decreased urine output  Catheter draining  Given 2.5L IV  fluids in ED  -initially treated with IV fluids without improvement; will stop  -consult nephrology for eval  -hold torsemide  -renally dose meds  -repeat BMP daily  -cont to improve. Good urine output.  -discharge planning to SNF. CM on board.    Abdominal pain  - hyoscyamine prn added, did not use dose yet; try today  - bowel regimen      Anxiety  - hydroxyzine held due to lethargy  - resume seroquel at lower dose. Trazodone PRN      Hyponatremia  Na 129, baseline 139  In setting of CARITO and poor PO intake  -given 2.5L LR in ED  -cont LR   -monitor labs  -hold torsemide  -nephrology consulted    Bilateral lower extremity edema  Chronic  Hold torsemide for renal failure      Urinary tract infection associated with cystostomy catheter  Recurrent UTI, completed course of cipro 2 days ago  Followed by ID and urology outpatient  Dark, cloudy urine noted. Patient c/o malaise, chills, N/V, weakness, decreased PO intake  Given a dose of rocephin in ED  -switched to cefepime given recent urine culture sensitivities on 1/21  -urine and blood cx pending; also with candida in urine which may be colonization, on fluconazole for now per Nephro  -consult urology for catheter change and eval, performed on 2/4  -completed treatment with cefepime/fluconazole          Chronic diastolic heart failure  Denies SOB, does have chronic BLE  No respiratory distress noted on room air  -chest XRAY with bilateral atelectasis; incentive spirometry  -hold torsemide 2/2 renal failure      Bradycardia  Chronic  EKG not in chart  HR 40s-50s, asymptomatic  Cardiac monitoring      Primary hypothyroidism  Cont synthroid      Frequent falls  Consult PT/OT for eval: recommend HH      Neurogenic bladder  Has chronic indwelling meadows  -cont ditropan  -discontinue zanaflex        Chronic pain syndrome  Has dilaudid intrathecal pump  -cont home norco dose  -bowel regimen  -monitor closely       Major depressive disorder, recurrent, mild  Cont  prozac      Sleep apnea  Non-compliant with CPAP at home  O2 sat drops while sleeping  -CPAP at night        VTE Risk Mitigation (From admission, onward)         Ordered     heparin (porcine) injection 5,000 Units  Every 8 hours         02/03/22 1734     IP VTE HIGH RISK PATIENT  Once         02/03/22 1734     Place sequential compression device  Until discontinued         02/03/22 1734                Discharge Planning   JACKIE: 2/8/2022     Code Status: Full Code   Is the patient medically ready for discharge?:     Reason for patient still in hospital (select all that apply): Pending disposition  Discharge Plan A: Skilled Nursing Facility                  Braydon Martel MD  Department of Hospital Medicine   [facility]

## 2022-02-10 NOTE — SUBJECTIVE & OBJECTIVE
Interval History: Mental status much improved today, back to baseline - lethargy likely medication induced. Daughter at bedside states pain pump dose also recently increased. Kidney function continues to improve. Pt agreeable for discharge to SNF.    Review of Systems   Constitutional: Negative for appetite change and fever.   Respiratory: Negative for shortness of breath.    Cardiovascular: Negative for chest pain.   Neurological: Positive for weakness.   Psychiatric/Behavioral: Negative for sleep disturbance.     Objective:     Vital Signs (Most Recent):  Temp: 98.5 °F (36.9 °C) (02/10/22 1059)  Pulse: 61 (02/10/22 1200)  Resp: 18 (02/10/22 1059)  BP: (!) 97/55 (02/10/22 1059)  SpO2: 96 % (02/10/22 1059) Vital Signs (24h Range):  Temp:  [97.4 °F (36.3 °C)-98.7 °F (37.1 °C)] 98.5 °F (36.9 °C)  Pulse:  [40-61] 61  Resp:  [16-19] 18  SpO2:  [86 %-100 %] 96 %  BP: ()/(51-62) 97/55     Weight: 98 kg (216 lb 0.8 oz)  Body mass index is 37.09 kg/m².    Intake/Output Summary (Last 24 hours) at 2/10/2022 1512  Last data filed at 2/10/2022 1200  Gross per 24 hour   Intake 1361.91 ml   Output 2000 ml   Net -638.09 ml      Physical Exam  Vitals and nursing note reviewed.   Constitutional:       General: She is not in acute distress.     Appearance: She is ill-appearing.   HENT:      Head: Normocephalic and atraumatic.      Nose: Nose normal.      Mouth/Throat:      Mouth: Mucous membranes are moist.   Eyes:      Pupils: Pupils are equal, round, and reactive to light.   Cardiovascular:      Rate and Rhythm: Normal rate and regular rhythm.      Pulses: Normal pulses.      Heart sounds: Normal heart sounds.   Pulmonary:      Effort: Pulmonary effort is normal.      Breath sounds: Normal breath sounds.   Abdominal:      General: Bowel sounds are normal.      Palpations: Abdomen is soft.      Comments: Suprapubic catheter   Musculoskeletal:         General: Tenderness present. Normal range of motion.      Cervical back:  Normal range of motion.      Right lower leg: Edema present.      Left lower leg: Edema present.   Skin:     General: Skin is warm and dry.   Neurological:      Mental Status: She is alert and oriented to person, place, and time.      Motor: Weakness present.      Comments: R facial droop, chronic per patient   Psychiatric:         Behavior: Behavior normal.         Significant Labs: All pertinent labs within the past 24 hours have been reviewed.    Significant Imaging: I have reviewed all pertinent imaging results/findings within the past 24 hours.

## 2022-02-10 NOTE — PLAN OF CARE
TN met with pt and pt's daughter Lisa at bedside for d/c planning. TN discussed SNF placement with pt's daughter. Pt and pt's daughter agreeable to SNF placement.  Post acute system list reviewed with pt's daughter. SNF preferences are [facility] and [facility]. TN sent referrals.       02/10/22 1207   Post-Acute Status   Post-Acute Authorization Placement   Post-Acute Placement Status Referrals Sent   Home Health Status Discharge Plan Changed   Discharge Plan   Discharge Plan A Skilled Nursing Facility

## 2022-02-10 NOTE — PLAN OF CARE
SNF referral sent to [facility] in event patient's daughter chooses [facility] preference.

## 2022-02-10 NOTE — PT/OT/SLP PROGRESS
"Speech Language Pathology Treatment    Patient Name:  Tasha Hawley   MRN:  [id number]  Admitting Diagnosis: Acute renal failure with tubular necrosis    Recommendations:                 General Recommendations:  recommend SNF placement at DC  Diet recommendations:  Regular (renal, boost), Liquid Diet Level: Thin   Aspiration Precautions: upright for meals, encourage PO intake, boost with meal, can feed self, whole meds   General Precautions: Standard, fall,respiratory,hearing impaired  Communication strategies:  [place], repeat information, speak on L side better ear.     Subjective     Pt seen this date for direct dysphagia in room. Dtr at bedside  Patient goals: "I will try the vanilla one for now."    Pain/Comfort:  · Pain Rating 1: 0/10    Respiratory Status: NC    Objective:     Has the patient been evaluated by SLP for swallowing?   Yes  Keep patient NPO? No   Current Respiratory Status:    NC    Cognition/Communication: Pt seen in room, she was upright in bed. Pt awake and cooperative with SLP. Dtr at bedside and inquired about SLP role.   Pt oriented to self, dtr and reason for hospital admit. Pt able to engage in basic conversation with SLP but needs cues to redirect attention and needs SLP to speak in better ear L side. Pt was attempting to open cell phone and look at calendar. Dtr in agreement with SLP that pt was having confusion due to over medication and pain pump upon admit. Pt is improving and returning to baseline status.     Swallowing: Pt trialed with boost, consumed over 50% of lunch meal, able to recall meal for SLP. Pt consumed entire vanilla boost with SLP, she self fed. No voice changes and no audible coughing or choking. Will order boost with meals and discuss with dietician if she can do boost versus renal drink. RN made aware of boost added to meals.   Education provided to dtr at bedside this date, all questions answered within her scope of practice.     Assessment:     Tasha Hawley " is a 66 y.o. female admitted with Acute renal failure with tubular necrosis with an SLP diagnosis of improved cognitive status, returning to baseline per dtr at bedside. She expressed concern that pt needs to consume more water versus carbonated drinks. Education provided to dtr on oral supplements and encouraged to consume more at meals and drink supplements as snack but they are meal replacements.  Dtr verbalized understanding of recs.   Secure chat sent to MD and RN s/p session.      Goals:   Multidisciplinary Problems     SLP Goals     Not on file          Multidisciplinary Problems (Resolved)        Problem: SLP Goal    Goal Priority Disciplines Outcome   SLP Goal   (Resolved)     SLP Met   Description: Short Term Goals:  1. Pt will participate in swallow eval to determine safest diet level.  2. Pt will tolerate current diet renal/regular textures and thin liquids with 80% PO intake, no dysphagia signs  3. SLP will monitor cognitive status to determine etiology of confusion  Further goals pending patient's progress                   Plan:     · Plan of Care expires:  03/10/22  · Plan of Care reviewed with:  patient,daughter   · SLP Follow-Up:  No       Discharge recommendations:  nursing facility, skilled   Barriers to Discharge:  none    Time Tracking:     SLP Treatment Date:   02/10/22  Speech Start Time:  1346  Speech Stop Time:  1407     Speech Total Time (min):  21 min    Billable Minutes: Treatment Swallowing Dysfunction 12 and Self Care/Home Management Training 9    02/10/2022

## 2022-02-10 NOTE — PLAN OF CARE
Patient AAOx1, on O2 at 2 liters per nasal cannula. patient has no complaints of pain. PPD test done on left forearm, due to be read on 2/12 3:05 p.m. FARHAD in the room and the call light is within the reach and side rails are up x3, bed alarm on.

## 2022-02-10 NOTE — PLAN OF CARE
Pt denied per [facility] and [facility].    [facility]-patient's care current exceeds capacity    [facility]-no available beds.    TN to reach out to patient's daughter for additional SNF preferences.       02/10/22 1348   Post-Acute Status   Post-Acute Authorization Placement   Discharge Plan   Discharge Plan A Skilled Nursing Facility

## 2022-02-10 NOTE — PLAN OF CARE
VN note: progress notes, labs and vital signs reviewed. Will be available to intervene if needed.

## 2022-02-10 NOTE — PLAN OF CARE
Per SLP, pt's daughter Lisa may be interested in [facility] SN[facility] for patient.    TN briefly spoke with daughter regarding SNF placement update and requesting additional SNF preferences. Pt's daughter states she will call TN back.       02/10/22 7727   Post-Acute Status   Post-Acute Authorization Placement   Discharge Plan   Discharge Plan A Skilled Nursing Facility

## 2022-02-10 NOTE — PROGRESS NOTES
[facility] Medicine  Progress Note    Patient Name: Tasha Hawley  MRN: [id number]  Patient Class: IP- Inpatient   Admission Date: 2/3/2022  Length of Stay: 7 days  Attending Physician: Braydon Martel MD  Primary Care Provider: Mesfin Hodges Ii, MD        Subjective:     Principal Problem:Acute renal failure with tubular necrosis        HPI:  Tasha Hawley is a 65 yo female with a pmh of neurogenic bladder, suprapubic catheter,recurrent UTI, chronic pain with epidural pain pump, CAD, hypothyroidism, sleep apnea. She c/o malaise, poor PO intake, N/V, chills, decreased urine output, weakness, chronic pain. She was seen by ID today and noted to have cloudy urine. She just finished a course of cipro and has recurrent UTIs. She states she has not been feeling well for a few weeks. She feels as though she is leaking urine from her urethra. She has home health with [facility] and states they changed her catheter 2 days ago (no record on chart) and she is not sure if a urine sample was obtained at that time. She has chronic swelling to her lower legs and walks with a walker. She fell a few days ago with no injury.       Overview/Hospital Course:  No notes on file    Interval History: Pt lethargic today. Frequently falls asleep during evaluation. Responds appropriately to questions. No neuro deficits. Received high dose of trazodone/seroquel last night. Kidney function continues to improve. Good urine output.    Review of Systems   Constitutional: Negative for appetite change and fever.   Respiratory: Negative for shortness of breath.    Cardiovascular: Negative for chest pain.   Neurological: Positive for weakness.   Psychiatric/Behavioral: Negative for sleep disturbance.     Objective:     Vital Signs (Most Recent):  Temp: 98.5 °F (36.9 °C) (02/10/22 1059)  Pulse: 61 (02/10/22 1200)  Resp: 18 (02/10/22 1059)  BP: (!) 97/55 (02/10/22 1059)  SpO2: 96 % (02/10/22 1059) Vital Signs (24h Range):  Temp:  [97.4  °F (36.3 °C)-98.7 °F (37.1 °C)] 98.5 °F (36.9 °C)  Pulse:  [40-61] 61  Resp:  [16-19] 18  SpO2:  [86 %-100 %] 96 %  BP: ()/(51-62) 97/55     Weight: 98 kg (216 lb 0.8 oz)  Body mass index is 37.09 kg/m².    Intake/Output Summary (Last 24 hours) at 2/10/2022 1506  Last data filed at 2/10/2022 1200  Gross per 24 hour   Intake 1361.91 ml   Output 2000 ml   Net -638.09 ml      Physical Exam  Vitals and nursing note reviewed.   Constitutional:       General: She is not in acute distress.     Appearance: She is ill-appearing.   HENT:      Head: Normocephalic and atraumatic.      Nose: Nose normal.      Mouth/Throat:      Mouth: Mucous membranes are moist.   Eyes:      Pupils: Pupils are equal, round, and reactive to light.   Cardiovascular:      Rate and Rhythm: Normal rate and regular rhythm.      Pulses: Normal pulses.      Heart sounds: Normal heart sounds.   Pulmonary:      Effort: Pulmonary effort is normal.      Breath sounds: Normal breath sounds.   Abdominal:      General: Bowel sounds are normal.      Palpations: Abdomen is soft.      Comments: Suprapubic catheter   Musculoskeletal:         General: Tenderness present. Normal range of motion.      Cervical back: Normal range of motion.      Right lower leg: Edema present.      Left lower leg: Edema present.   Skin:     General: Skin is warm and dry.   Neurological:      Mental Status: She is alert and oriented to person, place, and time.      Motor: Weakness present.      Comments: R facial droop, chronic per patient   Psychiatric:         Behavior: Behavior normal.         Significant Labs: All pertinent labs within the past 24 hours have been reviewed.    Significant Imaging: I have reviewed all pertinent imaging results/findings within the past 24 hours.      Assessment/Plan:      * Acute renal failure with tubular necrosis  BUN 22. Creatinine 7.2, baseline 1.2  Reports poor PO intake, N/V, decreased urine output  Catheter draining  Given 2.5L IV fluids  in ED  -initially treated with IV fluids without improvement; will stop  -consult nephrology for eval  -hold torsemide  -renally dose meds  -repeat BMP daily  -cont to improve. Good urine output.    Abdominal pain  - hyoscyamine prn added, did not use dose yet; try today  - bowel regimen      Anxiety  - hydroxyzine held due to lethargy  - trazodone and seroquel also held      Hyponatremia  Na 129, baseline 139  In setting of CARITO and poor PO intake  -given 2.5L LR in ED  -cont LR   -monitor labs  -hold torsemide  -nephrology consulted    Bilateral lower extremity edema  Chronic  Hold torsemide for renal failure      Urinary tract infection associated with cystostomy catheter  Recurrent UTI, completed course of cipro 2 days ago  Followed by ID and urology outpatient  Dark, cloudy urine noted. Patient c/o malaise, chills, N/V, weakness, decreased PO intake  Given a dose of rocephin in ED  -switched to cefepime given recent urine culture sensitivities on 1/21  -urine and blood cx pending; also with candida in urine which may be colonization, on fluconazole for now per Nephro  -consult urology for catheter change and eval, performed on 2/4  -cont treatment with cefepime/fluconazole as per nephrology          Chronic diastolic heart failure  Denies SOB, does have chronic BLE  No respiratory distress noted on room air  -chest XRAY with bilateral atelectasis; incentive spirometry  -hold torsemide 2/2 renal failure      Bradycardia  Chronic  EKG not in chart  HR 40s-50s, asymptomatic  Cardiac monitoring      Primary hypothyroidism  Cont synthroid      Frequent falls  Consult PT/OT for eval: recommend HH      Neurogenic bladder  Has chronic indwelling meadows  -cont ditropan  -discontinue zanaflex        Chronic pain syndrome  Has dilaudid intrathecal pump  -cont home norco dose  -bowel regimen  -monitor closely       Major depressive disorder, recurrent, mild  Cont prozac      Sleep apnea  Non-compliant with CPAP at home  O2  sat drops while sleeping  -CPAP at night        VTE Risk Mitigation (From admission, onward)         Ordered     heparin (porcine) injection 5,000 Units  Every 8 hours         02/03/22 1734     IP VTE HIGH RISK PATIENT  Once         02/03/22 1734     Place sequential compression device  Until discontinued         02/03/22 1734                Discharge Planning   JACKIE: 2/8/2022     Code Status: Full Code   Is the patient medically ready for discharge?:     Reason for patient still in hospital (select all that apply): Patient new problem, Patient trending condition and Treatment  Discharge Plan A: Skilled Nursing Facility                  Braydon Martel MD  Department of Hospital Medicine   [facility] Surg

## 2022-02-10 NOTE — ASSESSMENT & PLAN NOTE
Recurrent UTI, completed course of cipro 2 days ago  Followed by ID and urology outpatient  Dark, cloudy urine noted. Patient c/o malaise, chills, N/V, weakness, decreased PO intake  Given a dose of rocephin in ED  -switched to cefepime given recent urine culture sensitivities on 1/21  -urine and blood cx pending; also with candida in urine which may be colonization, on fluconazole for now per Nephro  -consult urology for catheter change and eval, performed on 2/4  -completed treatment with cefepime/fluconazole

## 2022-02-10 NOTE — PLAN OF CARE
Problem: SLP Goal  Goal: SLP Goal  Description: Short Term Goals:  1. Pt will participate in swallow eval to determine safest diet level.  2. Pt will tolerate current diet renal/regular textures and thin liquids with 80% PO intake, no dysphagia signs  3. SLP will monitor cognitive status to determine etiology of confusion  Further goals pending patient's progress  Outcome: Met   Recommend SNF placement at DC, SLP spoke with dtr during PM session. Will initiate boost with meals to assist with increasing PO intake. Pt more lucid, dtr reports no prior CVA. Pt able to converse with SLP needs more cues to redirect attention.

## 2022-02-11 LAB
ALBUMIN SERPL BCP-MCNC: 3.1 G/DL (ref 3.5–5.2)
ANION GAP SERPL CALC-SCNC: 6 MMOL/L (ref 8–16)
ANION GAP SERPL CALC-SCNC: 7 MMOL/L (ref 8–16)
BUN SERPL-MCNC: 14 MG/DL (ref 8–23)
BUN SERPL-MCNC: 15 MG/DL (ref 8–23)
CALCIUM SERPL-MCNC: 9.4 MG/DL (ref 8.7–10.5)
CALCIUM SERPL-MCNC: 9.5 MG/DL (ref 8.7–10.5)
CHLORIDE SERPL-SCNC: 101 MMOL/L (ref 95–110)
CHLORIDE SERPL-SCNC: 101 MMOL/L (ref 95–110)
CO2 SERPL-SCNC: 32 MMOL/L (ref 23–29)
CO2 SERPL-SCNC: 33 MMOL/L (ref 23–29)
CREAT SERPL-MCNC: 2.1 MG/DL (ref 0.5–1.4)
CREAT SERPL-MCNC: 2.1 MG/DL (ref 0.5–1.4)
EST. GFR  (AFRICAN AMERICAN): 28 ML/MIN/1.73 M^2
EST. GFR  (AFRICAN AMERICAN): 28 ML/MIN/1.73 M^2
EST. GFR  (NON AFRICAN AMERICAN): 24 ML/MIN/1.73 M^2
EST. GFR  (NON AFRICAN AMERICAN): 24 ML/MIN/1.73 M^2
GLUCOSE SERPL-MCNC: 94 MG/DL (ref 70–110)
GLUCOSE SERPL-MCNC: 94 MG/DL (ref 70–110)
PHOSPHATE SERPL-MCNC: 2.9 MG/DL (ref 2.7–4.5)
POTASSIUM SERPL-SCNC: 4.8 MMOL/L (ref 3.5–5.1)
POTASSIUM SERPL-SCNC: 4.8 MMOL/L (ref 3.5–5.1)
SODIUM SERPL-SCNC: 140 MMOL/L (ref 136–145)
SODIUM SERPL-SCNC: 140 MMOL/L (ref 136–145)

## 2022-02-11 PROCEDURE — 97116 GAIT TRAINING THERAPY: CPT | Mod: HCNC,CQ

## 2022-02-11 PROCEDURE — 80069 RENAL FUNCTION PANEL: CPT | Mod: HCNC | Performed by: STUDENT IN AN ORGANIZED HEALTH CARE EDUCATION/TRAINING PROGRAM

## 2022-02-11 PROCEDURE — 97110 THERAPEUTIC EXERCISES: CPT | Mod: HCNC,CQ

## 2022-02-11 PROCEDURE — 25000003 PHARM REV CODE 250: Mod: HCNC | Performed by: NURSE PRACTITIONER

## 2022-02-11 PROCEDURE — 63600175 PHARM REV CODE 636 W HCPCS: Mod: HCNC | Performed by: NURSE PRACTITIONER

## 2022-02-11 PROCEDURE — 94799 UNLISTED PULMONARY SVC/PX: CPT | Mod: HCNC

## 2022-02-11 PROCEDURE — 25000003 PHARM REV CODE 250: Mod: HCNC | Performed by: STUDENT IN AN ORGANIZED HEALTH CARE EDUCATION/TRAINING PROGRAM

## 2022-02-11 PROCEDURE — 97530 THERAPEUTIC ACTIVITIES: CPT | Mod: HCNC,CQ

## 2022-02-11 PROCEDURE — 21400001 HC TELEMETRY ROOM: Mod: HCNC

## 2022-02-11 PROCEDURE — 25000003 PHARM REV CODE 250: Mod: HCNC | Performed by: HOSPITALIST

## 2022-02-11 PROCEDURE — 27000221 HC OXYGEN, UP TO 24 HOURS: Mod: HCNC

## 2022-02-11 PROCEDURE — 99900035 HC TECH TIME PER 15 MIN (STAT): Mod: HCNC

## 2022-02-11 PROCEDURE — 94761 N-INVAS EAR/PLS OXIMETRY MLT: CPT | Mod: HCNC

## 2022-02-11 PROCEDURE — 94660 CPAP INITIATION&MGMT: CPT | Mod: HCNC

## 2022-02-11 PROCEDURE — 80048 BASIC METABOLIC PNL TOTAL CA: CPT | Mod: HCNC | Performed by: INTERNAL MEDICINE

## 2022-02-11 PROCEDURE — 25000003 PHARM REV CODE 250: Mod: HCNC | Performed by: INTERNAL MEDICINE

## 2022-02-11 PROCEDURE — 94760 N-INVAS EAR/PLS OXIMETRY 1: CPT | Mod: HCNC

## 2022-02-11 RX ADMIN — QUETIAPINE FUMARATE 50 MG: 25 TABLET ORAL at 09:02

## 2022-02-11 RX ADMIN — HEPARIN SODIUM 5000 UNITS: 5000 INJECTION INTRAVENOUS; SUBCUTANEOUS at 09:02

## 2022-02-11 RX ADMIN — FLUOXETINE HYDROCHLORIDE 60 MG: 20 CAPSULE ORAL at 08:02

## 2022-02-11 RX ADMIN — SEVELAMER CARBONATE 800 MG: 800 TABLET, FILM COATED ORAL at 08:02

## 2022-02-11 RX ADMIN — LEVOTHYROXINE SODIUM 125 MCG: 0.03 TABLET ORAL at 05:02

## 2022-02-11 RX ADMIN — SEVELAMER CARBONATE 800 MG: 800 TABLET, FILM COATED ORAL at 05:02

## 2022-02-11 RX ADMIN — HYOSCYAMINE SULFATE 0.12 MG: 0.12 TABLET ORAL; SUBLINGUAL at 05:02

## 2022-02-11 RX ADMIN — OXYBUTYNIN CHLORIDE 15 MG: 5 TABLET, EXTENDED RELEASE ORAL at 08:02

## 2022-02-11 RX ADMIN — ONDANSETRON 4 MG: 2 INJECTION INTRAMUSCULAR; INTRAVENOUS at 01:02

## 2022-02-11 RX ADMIN — POLYETHYLENE GLYCOL 3350 17 G: 17 POWDER, FOR SOLUTION ORAL at 08:02

## 2022-02-11 RX ADMIN — TRAZODONE HYDROCHLORIDE 50 MG: 50 TABLET ORAL at 09:02

## 2022-02-11 RX ADMIN — ATORVASTATIN CALCIUM 80 MG: 40 TABLET, FILM COATED ORAL at 08:02

## 2022-02-11 RX ADMIN — HEPARIN SODIUM 5000 UNITS: 5000 INJECTION INTRAVENOUS; SUBCUTANEOUS at 01:02

## 2022-02-11 RX ADMIN — PANTOPRAZOLE SODIUM 40 MG: 40 TABLET, DELAYED RELEASE ORAL at 08:02

## 2022-02-11 RX ADMIN — STANDARDIZED SENNA CONCENTRATE 2 TABLET: 8.6 TABLET ORAL at 09:02

## 2022-02-11 RX ADMIN — STANDARDIZED SENNA CONCENTRATE 2 TABLET: 8.6 TABLET ORAL at 08:02

## 2022-02-11 RX ADMIN — SEVELAMER CARBONATE 800 MG: 800 TABLET, FILM COATED ORAL at 11:02

## 2022-02-11 RX ADMIN — HEPARIN SODIUM 5000 UNITS: 5000 INJECTION INTRAVENOUS; SUBCUTANEOUS at 05:02

## 2022-02-11 RX ADMIN — ASPIRIN 81 MG: 81 TABLET, COATED ORAL at 08:02

## 2022-02-11 NOTE — PROGRESS NOTES
[facility] Medicine  Progress Note    Patient Name: Tasha Hawley  MRN: [id number]  Patient Class: IP- Inpatient   Admission Date: 2/3/2022  Length of Stay: 8 days  Attending Physician: Braydon Martel MD  Primary Care Provider: Mesfin Hodges Ii, MD        Subjective:     Principal Problem:Acute renal failure with tubular necrosis        HPI:  Tasha Hawley is a 67 yo female with a pmh of neurogenic bladder, suprapubic catheter,recurrent UTI, chronic pain with epidural pain pump, CAD, hypothyroidism, sleep apnea. She c/o malaise, poor PO intake, N/V, chills, decreased urine output, weakness, chronic pain. She was seen by ID today and noted to have cloudy urine. She just finished a course of cipro and has recurrent UTIs. She states she has not been feeling well for a few weeks. She feels as though she is leaking urine from her urethra. She has home health with [facility] and states they changed her catheter 2 days ago (no record on chart) and she is not sure if a urine sample was obtained at that time. She has chronic swelling to her lower legs and walks with a walker. She fell a few days ago with no injury.       Overview/Hospital Course:  No notes on file    Interval History: Mental status back to baseline. Kidney function continues to improve. Discharge planning to SNF.    Review of Systems   Constitutional: Negative for appetite change and fever.   Respiratory: Negative for shortness of breath.    Cardiovascular: Negative for chest pain.   Neurological: Positive for weakness.   Psychiatric/Behavioral: Negative for sleep disturbance.     Objective:     Vital Signs (Most Recent):  Temp: 98.8 °F (37.1 °C) (02/11/22 1116)  Pulse: (!) 49 (02/11/22 1225)  Resp: 18 (02/11/22 1116)  BP: (!) 102/55 (02/11/22 1116)  SpO2: 98 % (02/11/22 1208) Vital Signs (24h Range):  Temp:  [97.6 °F (36.4 °C)-98.8 °F (37.1 °C)] 98.8 °F (37.1 °C)  Pulse:  [43-55] 49  Resp:  [18] 18  SpO2:  [89 %-100 %] 98 %  BP:  ()/(53-58) 102/55     Weight: 94.5 kg (208 lb 5.4 oz)  Body mass index is 35.76 kg/m².    Intake/Output Summary (Last 24 hours) at 2/11/2022 1400  Last data filed at 2/11/2022 1200  Gross per 24 hour   Intake 720 ml   Output 3050 ml   Net -2330 ml      Physical Exam  Vitals and nursing note reviewed.   Constitutional:       General: She is not in acute distress.     Appearance: She is ill-appearing.   HENT:      Head: Normocephalic and atraumatic.      Nose: Nose normal.      Mouth/Throat:      Mouth: Mucous membranes are moist.   Eyes:      Pupils: Pupils are equal, round, and reactive to light.   Cardiovascular:      Rate and Rhythm: Normal rate and regular rhythm.      Pulses: Normal pulses.      Heart sounds: Normal heart sounds.   Pulmonary:      Effort: Pulmonary effort is normal.      Breath sounds: Normal breath sounds.   Abdominal:      General: Bowel sounds are normal.      Palpations: Abdomen is soft.      Comments: Suprapubic catheter   Musculoskeletal:         General: Tenderness present. Normal range of motion.      Cervical back: Normal range of motion.      Right lower leg: Edema present.      Left lower leg: Edema present.   Skin:     General: Skin is warm and dry.   Neurological:      Mental Status: She is alert and oriented to person, place, and time.      Motor: Weakness present.      Comments: R facial droop, chronic per patient   Psychiatric:         Behavior: Behavior normal.         Significant Labs: All pertinent labs within the past 24 hours have been reviewed.    Significant Imaging: I have reviewed all pertinent imaging results/findings within the past 24 hours.      Assessment/Plan:      * Acute renal failure with tubular necrosis  BUN 22. Creatinine 7.2, baseline 1.2  Reports poor PO intake, N/V, decreased urine output  Catheter draining  Given 2.5L IV fluids in ED  -initially treated with IV fluids without improvement; will stop  -consult nephrology for eval  -hold  torsemide  -renally dose meds  -repeat BMP daily  -cont to improve. Good urine output.  -discharge planning to SNF    Abdominal pain  - hyoscyamine prn added, did not use dose yet; try today  - bowel regimen      Anxiety  - hydroxyzine held due to lethargy  - resume seroquel at lower dose. Trazodone PRN      Hyponatremia  Na 129, baseline 139  In setting of CARITO and poor PO intake  -given 2.5L LR in ED  -cont LR   -monitor labs  -hold torsemide  -nephrology consulted    Bilateral lower extremity edema  Chronic  Hold torsemide for renal failure      Urinary tract infection associated with cystostomy catheter  Recurrent UTI, completed course of cipro 2 days ago  Followed by ID and urology outpatient  Dark, cloudy urine noted. Patient c/o malaise, chills, N/V, weakness, decreased PO intake  Given a dose of rocephin in ED  -switched to cefepime given recent urine culture sensitivities on 1/21  -urine and blood cx pending; also with candida in urine which may be colonization, on fluconazole for now per Nephro  -consult urology for catheter change and eval, performed on 2/4  -completed treatment           Chronic diastolic heart failure  Denies SOB, does have chronic BLE  No respiratory distress noted on room air  -chest XRAY with bilateral atelectasis; incentive spirometry  -hold torsemide 2/2 renal failure      Bradycardia  Chronic  EKG not in chart  HR 40s-50s, asymptomatic  Cardiac monitoring      Primary hypothyroidism  Cont synthroid      Frequent falls  Consult PT/OT for eval: recommend SNF      Neurogenic bladder  Has chronic indwelling meadows  -cont ditropan  -discontinue zanaflex        Chronic pain syndrome  Has dilaudid intrathecal pump  -bowel regimen  -monitor closely       Major depressive disorder, recurrent, mild  Cont prozac      Sleep apnea  Non-compliant with CPAP at home  O2 sat drops while sleeping  -CPAP at night        VTE Risk Mitigation (From admission, onward)         Ordered     heparin (porcine)  injection 5,000 Units  Every 8 hours         02/03/22 1734     IP VTE HIGH RISK PATIENT  Once         02/03/22 1734     Place sequential compression device  Until discontinued         02/03/22 1734                Discharge Planning   JACKIE: 2/8/2022     Code Status: Full Code   Is the patient medically ready for discharge?:     Reason for patient still in hospital (select all that apply): Pending disposition  Discharge Plan A: Skilled Nursing Facility                  Braydon Martel MD  Department of Hospital Medicine   [facility] Surg

## 2022-02-11 NOTE — PROGRESS NOTES
[place] - Med Surg  Adult Nutrition  Progress Note    SUMMARY       Recommendations    Recommendation:   1. Continue current renal diet as tolerated.   2. Continue to provide Novasource Renal BID.   3. Monitor weight/labs.   4. RD to follow and monitor nutrition status    Goals:   Pt intake >/= 75% EEN/EPN by RD follow up    Nutrition Goal Status: new  Communication of RD Recs: other (comment) (POC)    Assessment and Plan    Abdominal pain  Contributing Nutrition Diagnosis  Inadequate energy intake    Related to (etiology):   Abdominal pain     Signs and Symptoms (as evidenced by):   Pt reports upper and lower abdominal pain, pt with acute renal failure with tubular necrosis, and LBM 2/2/22.      Interventions:  Collaboration with other providers  [employer]- Novasource Renal BID    Nutrition Diagnosis Status:   New             Reason for Assessment    Reason For Assessment: length of stay (day 8)  Diagnosis: renal disease (acute renal failure with tubular necrosis)  Relevant Medical History: anxiety, depression, hypothyroid, BLE edema, CAD, CHF, renal disorder, cervical fusion/spine surgery, cardiac catheterization  Interdisciplinary Rounds: did not attend  General Information Comments: Pt receiving renal diet with Novasource Renal TID, 50-75% intake of meals. Did not eat breakfast today because she wanted something different. Pt also endorases upper and lower abdominal pain, with LBM 2/2/22. Per chart pt's mental status at baseline, pending SNF placement. PIV, subcutaneous infusion pump. NFPE not completed today 2/2 pt did not consent  Nutrition Discharge Planning: d/c on renal diet with Novasource Renal 1x/day or similar    Nutrition Risk Screen    Nutrition Risk Screen: no indicators present    Nutrition/Diet History    Food Preferences: no cultural or spiritual food preferences identified  Spiritual, Cultural Beliefs, Church Practices, Values that Affect Care: no  Food Allergies: other (see comments)  "(citrus)  Factors Affecting Nutritional Intake: behavioral (mealtime) (limited acceptance of foods offered)    Anthropometrics    Temp: 98.8 °F (37.1 °C)  Height Method: Stated  Height: 5' 4" (162.6 cm)  Height (inches): 64 in  Weight Method: Bed Scale  Weight: 94.5 kg (208 lb 5.4 oz)  Weight (lb): 208.34 lb  Ideal Body Weight (IBW), Female: 120 lb  % Ideal Body Weight, Female (lb): 173.62 %  BMI (Calculated): 35.7  BMI Grade: 35 - 39.9 - obesity - grade II       Lab/Procedures/Meds    Pertinent Labs Reviewed: reviewed  Pertinent Labs Comments: K 5.4, Cr 2.7, eGFR 18  Pertinent Medications Reviewed: reviewed  Pertinent Medications Comments: aspirin, statin, fluoxetine, heparin, levothyroxine, oxybutynin, pantoprazole, poly glycol, quetiapine, senna, sevelamer carbonate, intrathecal pain pump compound      Estimated/Assessed Needs    Weight Used For Calorie Calculations: 94.5 kg (208 lb 5.4 oz)  Energy Calorie Requirements (kcal): 1890  Energy Need Method: Kcal/kg (20 kcal/kg)  Protein Requirements: 76 (0.8 gm/kg 2/2 renal disorder)  Weight Used For Protein Calculations: 94.5 kg (208 lb 5.4 oz)     Estimated Fluid Requirement Method: RDA Method (or PER MD)  RDA Method (mL): 1890         Nutrition Prescription Ordered    Current Diet Order: Renal  Oral Nutrition Supplement: Novasource Renal TID    Evaluation of Received Nutrient/Fluid Intake    I/O: 720/3050  Energy Calories Required: not meeting needs  Protein Required: not meeting needs  Fluid Required: meeting needs  Comments: LBM 2/2  % Intake of Estimated Energy Needs: 50 - 75 %  % Meal Intake: 50 - 75 %    Nutrition Risk    Level of Risk/Frequency of Follow-up:  (2x/week)     Monitor and Evaluation    Food and Nutrient Intake: energy intake,food and beverage intake  Food and Nutrient Adminstration: diet order  Knowledge/Beliefs/Attitudes: food and nutrition knowledge/skill  Physical Activity and Function: nutrition-related ADLs and IADLs  Anthropometric " Measurements: weight,weight change,body mass index  Biochemical Data, Medical Tests and Procedures: electrolyte and renal panel,gastrointestinal profile,glucose/endocrine profile,inflammatory profile,lipid profile     Nutrition Follow-Up    RD Follow-up?: Yes

## 2022-02-11 NOTE — PLAN OF CARE
Patient's SNF referral still pending review at [facility] and [facility]. Zoë at [facility] to review pt's referral prior to the end of the day.    TN updated pt's daughter Lisa on SNF placement status. TN also updated patient and family at bedside on SNF placement status. Patient prefers [facility] due to wanting to be close to her spouse.           02/11/22 1526   Post-Acute Status   Post-Acute Authorization Placement   Post-Acute Placement Status Pending post-acute provider review/more information requested   Discharge Plan   Discharge Plan A Skilled Nursing Facility

## 2022-02-11 NOTE — PT/OT/SLP PROGRESS
Physical Therapy Treatment    Patient Name:  Tasha Hawley   MRN:  [id number]    Recommendations:     Discharge Recommendations:  nursing facility, skilled   Discharge Equipment Recommendations:  (TBD)   Barriers to discharge: decreased mobility,strength and endurance    Assessment:     Tasha Hawley is a 66 y.o. female admitted with a medical diagnosis of Acute renal failure with tubular necrosis.  She presents with the following impairments/functional limitations:  weakness,impaired endurance,impaired functional mobilty,gait instability,impaired balance,decreased upper extremity function,impaired cognition,decreased lower extremity function,pain,decreased ROM,impaired coordination,impaired cardiopulmonary response to activity,pt with good participation and appears to be focusing better,pt requires assistance with mobility and will benefit from SNF upon discharge to address above functional limitations.    Rehab Prognosis: Fair; patient would benefit from acute skilled PT services to address these deficits and reach maximum level of function.    Recent Surgery: * No surgery found *      Plan:     During this hospitalization, patient to be seen 5 x/week to address the identified rehab impairments via gait training,therapeutic activities,therapeutic exercises,neuromuscular re-education and progress toward the following goals:    · Plan of Care Expires:  03/04/22    Subjective     Chief Complaint: n/a  Patient/Family Comments/goals: pt agreeable to up in chair.  Pain/Comfort:  · Pain Rating 1:  (no rating)  · Location - Orientation 1: lower  · Location 1: back  · Pain Addressed 1: Reposition,Distraction      Objective:     Communicated with nsg prior to session.  Patient found supine with bed alarm,oxygen,medaows catheter,peripheral IV upon PT entry to room.     General Precautions: Standard, fall,hearing impaired,respiratory   Orthopedic Precautions:N/A   Braces: N/A  Respiratory Status: Nasal cannula, flow 2  L/min     Functional Mobility:  · Bed Mobility:     · Supine to Sit: contact guard assistance and minimum assistance  · Transfers:     · Sit to Stand:  minimum assistance with rolling walker  · Bed to Chair: minimum assistance with  rolling walker  using  ambulation  · Gait: amb ~14' with RW and Min A with v/c's with O2 donned  · Balance: fair standing balance with RW      AM-PAC 6 CLICK MOBILITY  Turning over in bed (including adjusting bedclothes, sheets and blankets)?: 3  Sitting down on and standing up from a chair with arms (e.g., wheelchair, bedside commode, etc.): 3  Moving from lying on back to sitting on the side of the bed?: 3  Moving to and from a bed to a chair (including a wheelchair)?: 3  Need to walk in hospital room?: 3  Climbing 3-5 steps with a railing?: 1  Basic Mobility Total Score: 16       Therapeutic Activities and Exercises: le seated ex's X 10-12 reps inc: ap,laq,hip flex,steppinig in place inside RW X 15 reps with CGA,pt sat EOB ~7 mins with S before standing up.       Patient left up in chair with all lines intact, call button in reach, chair alarm on and nsg notified..    GOALS: see general POC  Multidisciplinary Problems     Physical Therapy Goals        Problem: Physical Therapy Goal    Goal Priority Disciplines Outcome Goal Variances Interventions   Physical Therapy Goal     PT, PT/OT Ongoing, Progressing     Description: Goals to be met by: 3/4/22     Patient will increase functional independence with mobility by performin. Supine <> sit with supervision  2. Sit to stand transfer with supervision  3. Bed to chair transfer with Supervision using Rolling Walker  4. Gait  x 50 feet with Supervision using Rolling Walker.   5. Lower extremity exercise program x 10 reps per handout, with supervision                     Time Tracking:     PT Received On: 22  PT Start Time: 821     PT Stop Time: 859  PT Total Time (min): 38 min     Billable Minutes: Gait Training 14,  Therapeutic Activity 12 and Therapeutic Exercise 12    Treatment Type: Treatment  PT/PTA: PTA     PTA Visit Number: 1 02/11/2022

## 2022-02-11 NOTE — PROGRESS NOTES
Nephrology Progress Note     Consult Requested By: Braydon Martel MD  Reason for Consult: CARITO    SUBJECTIVE:      ?    Review of Systems   Constitutional: Positive for malaise/fatigue. Negative for chills and fever.   HENT: Negative for congestion and sore throat.    Eyes: Negative for blurred vision, double vision and photophobia.   Respiratory: Negative for cough and shortness of breath.    Cardiovascular: Negative for chest pain, palpitations and leg swelling.   Gastrointestinal: Negative for abdominal pain, diarrhea, nausea and vomiting.   Genitourinary: Negative for dysuria and urgency.   Musculoskeletal: Negative for joint pain and myalgias.   Skin: Negative for itching and rash.   Neurological: Positive for weakness. Negative for dizziness, sensory change and headaches.   Endo/Heme/Allergies: Negative for polydipsia. Does not bruise/bleed easily.   Psychiatric/Behavioral: Positive for memory loss. Negative for depression. The patient is nervous/anxious and has insomnia.        Past Medical History:   Diagnosis Date    Anticoagulant long-term use     Anxiety     Arthritis     Bilateral lower extremity edema     severe chronic    Carotid artery occlusion     Cataract     CHF (congestive heart failure)     Coronary artery disease     subtotalled LAD with collateral    Depression     Fever blister     Hypothyroid     Iron deficiency anemia     Lumbar radiculopathy     with chronic pain    Ocular migraine     Renal disorder     Sleep apnea     cpap     Past Surgical History:   Procedure Laterality Date    ADENOIDECTOMY      BACK SURGERY      x 12    CARDIAC CATHETERIZATION  2016    subtotalled LAD with right to left collaterals    CATARACT EXTRACTION W/  INTRAOCULAR LENS IMPLANT Left     Dr Coleman     CYSTOSCOPIC LITHOLAPAXY N/A 6/27/2019    Procedure: CYSTOLITHOLAPAXY;  Surgeon: Shireen Mayo MD;  Location: [facility] OR [address] [place] [address];  Service: Urology;  Laterality: N/A;    CYSTOSCOPIC  LITHOLAPAXY N/A 9/3/2019    Procedure: CYSTOLITHOLAPAXY;  Surgeon: Shireen Maoy MD;  Location: [facility] OR 2ND FLR;  Service: Urology;  Laterality: N/A;    CYSTOSCOPY N/A 7/13/2021    Procedure: CYSTOSCOPY;  Surgeon: Shireen Mayo MD;  Location: [facility] OR 1ST FLR;  Service: Urology;  Laterality: N/A;    CYSTOSCOPY  11/16/2021    Procedure: CYSTOSCOPY;  Surgeon: Shireen Mayo MD;  Location: [facility] OR 1ST FLR;  Service: Urology;;    ESOPHAGOGASTRODUODENOSCOPY N/A 5/23/2018    Procedure: ESOPHAGOGASTRODUODENOSCOPY (EGD);  Surgeon: Prince Vance MD;  Location: [facility] (4TH FLR);  Service: Endoscopy;  Laterality: N/A;  r/s 'd per Dr. Vance due to family emergency- ER    HYSTERECTOMY  1975    endometriosis    INJECTION OF BOTULINUM TOXIN TYPE A  7/13/2021    Procedure: INJECTION, BOTULINUM TOXIN, 200units;  Surgeon: Shireen Mayo MD;  Location: [facility] OR 1ST FLR;  Service: Urology;;    INJECTION OF BOTULINUM TOXIN TYPE A  11/16/2021    Procedure: INJECTION, BOTULINUM TOXIN, 200units;  Surgeon: Shireen Mayo MD;  Location: [facility] OR 1ST FLR;  Service: Urology;;    pain pump placement      SQ Dilaudid Pump managed by Dr. Hillman, Pain Management    REPLACEMENT OF CATHETER N/A 10/31/2019    Procedure: REPLACEMENT, CATHETER-SUPRAPUBIC;  Surgeon: Shireen Mayo MD;  [facility];  Service: Urology;  Laterality: N/A;    SPINAL CORD STIMULATOR REMOVAL      before [place]    SPINE SURGERY  5-13-13    CERVICAL FUSION    TONSILLECTOMY       Family History   Problem Relation Age of Onset    Cancer Mother 55        breast    Cancer Father         esophagus,had laryngectomy    Esophageal cancer Father     Parkinsonism Maternal Grandmother     Tremor Maternal Grandmother     No Known Problems Brother     No Known Problems Brother     Heart disease Maternal Uncle     Colon cancer Maternal Uncle         Less than 60    No Known Problems Sister     No Known Problems Maternal Aunt      Cirrhosis Paternal Aunt         ETOH    Liver disease Paternal Aunt         ETOH    Liver disease Paternal Uncle         ETOH    Cirrhosis Paternal Uncle         ETOH    No Known Problems Maternal Grandfather     No Known Problems Paternal Grandmother     No Known Problems Paternal Grandfather     Melanoma Neg Hx     Amblyopia Neg Hx     Blindness Neg Hx     Cataracts Neg Hx     Diabetes Neg Hx     Glaucoma Neg Hx     Hypertension Neg Hx     Macular degeneration Neg Hx     Retinal detachment Neg Hx     Strabismus Neg Hx     Stroke Neg Hx     Thyroid disease Neg Hx     Celiac disease Neg Hx     Colon polyps Neg Hx     Cystic fibrosis Neg Hx     Crohn's disease Neg Hx     Inflammatory bowel disease Neg Hx     Liver cancer Neg Hx     Rectal cancer Neg Hx     Stomach cancer Neg Hx     Ulcerative colitis Neg Hx     Lymphoma Neg Hx      Social History     Tobacco Use    Smoking status: Never Smoker    Smokeless tobacco: Never Used   Substance Use Topics    Alcohol use: Never    Drug use: No       Review of patient's allergies indicates:   Allergen Reactions    (d)-limonene flavor      Other reaction(s): difficult intubation  Other reaction(s): Difficulty breathing    Bactrim [sulfamethoxazole-trimethoprim] Anaphylaxis    Benadryl [diphenhydramine hcl] Shortness Of Breath    Fentanyl Itching, Nausea And Vomiting and Swelling             Imitrex [sumatriptan succinate] Shortness Of Breath    Topamax [topiramate] Shortness Of Breath    Vancomycin Shortness Of Breath     Rash    Butorphanol tartrate     Darvocet a500 [propoxyphene n-acetaminophen]      Other reaction(s): Difficulty breathing    White petrolatum-zinc     Zinc oxide-white petrolatum      Other reaction(s): Difficulty breathing    Latex, natural rubber Itching and Rash    Phenytoin Rash and Other (See Comments)     Trouble breathing            OBJECTIVE:     Vital Signs (Most Recent)  Vitals:    02/11/22 0400  02/11/22 0515 02/11/22 0734 02/11/22 0806   BP:  (!) 117/58 (!) 96/53    Patient Position:  Lying Lying    Pulse: (!) 43 (!) 45 (!) 47    Resp:  18 18    Temp:  98.1 °F (36.7 °C) 98.4 °F (36.9 °C)    TempSrc:  Oral Oral    SpO2:  100% (!) 89% 96%   Weight:       Height:             Date 02/11/22 0700 - 02/12/22 0659   Shift 1143-5737 8559-2870 6937-2522 24 Hour Total   INTAKE   P.O. 240   240   Shift Total(mL/kg) 240(2.5)   240(2.5)   OUTPUT   Shift Total(mL/kg)       Weight (kg) 94.5 94.5 94.5 94.5           Medications:   aspirin  81 mg Oral Daily    atorvastatin  80 mg Oral Daily    FLUoxetine  60 mg Oral Daily    heparin (porcine)  5,000 Units Subcutaneous Q8H    levothyroxine  125 mcg Oral Before breakfast    oxybutynin  15 mg Oral Daily    pantoprazole  40 mg Oral Daily    polyethylene glycol  17 g Oral Daily    QUEtiapine  50 mg Oral QHS    senna  2 tablet Oral BID    sevelamer carbonate  800 mg Oral TID WM           Physical Exam  Vitals and nursing note reviewed.   Constitutional:       General: She is not in acute distress.     Appearance: She is obese. She is ill-appearing. She is not diaphoretic.   HENT:      Head: Normocephalic and atraumatic.      Mouth/Throat:      Pharynx: No oropharyngeal exudate.   Eyes:      General: No scleral icterus.     Conjunctiva/sclera: Conjunctivae normal.      Pupils: Pupils are equal, round, and reactive to light.   Cardiovascular:      Rate and Rhythm: Normal rate and regular rhythm.      Heart sounds: Normal heart sounds. No murmur heard.      Pulmonary:      Effort: Pulmonary effort is normal. No respiratory distress.      Breath sounds: Normal breath sounds.   Abdominal:      General: Bowel sounds are normal. There is no distension.      Palpations: Abdomen is soft.      Tenderness: There is no abdominal tenderness.   Genitourinary:     Comments: Suprapubic catheter   Musculoskeletal:         General: Normal range of motion.      Cervical back: Normal  range of motion and neck supple.   Skin:     General: Skin is warm and dry.      Findings: No erythema.   Neurological:      Mental Status: She is alert and oriented to person, place, and time.      Cranial Nerves: No cranial nerve deficit.   Psychiatric:         Mood and Affect: Affect normal.         Cognition and Memory: Memory normal.         Judgment: Judgment normal.         Laboratory:  Recent Labs   Lab 02/05/22  0308 02/05/22  0308 02/05/22  0527 02/09/22  1124   WBC 5.42  --  5.61 5.48   HGB 11.2*  --  11.3* 10.3*   HCT 33.7*  --  34.9* 34.2*     --  175 177   MONO 12.0  0.7   < > 10.9  0.6 9.1  0.5    < > = values in this interval not displayed.     Recent Labs   Lab 02/06/22  0506 02/06/22  0506 02/07/22  0557 02/07/22  0557 02/08/22  0623 02/09/22  1124 02/10/22  1637      < > 140   < > 136 139 139   K 3.9   < > 4.2   < > 4.2 4.4 5.4*   CL 97   < > 99   < > 98 100 100   CO2 27   < > 24   < > 28 27 27   BUN 24*   < > 23   < > 21 18 15   CREATININE 7.2*   < > 6.4*   < > 5.2* 3.7* 2.7*   CALCIUM 8.0*   < > 8.1*   < > 8.8 9.4 9.4   PHOS 5.5*  --  5.3*  --  4.4  --   --     < > = values in this interval not displayed.       Diagnostic Results:  X-Ray: Reviewed  US: Reviewed  Echo: Reviewed  ASSESSMENT/PLAN:     1. CARITO/Acidosis/Hyponatremia   - as of now ATN due to UTI, no obstruction,  US/CT kidney noncontributory   -- Cr  up 7.2-7.4  unknown patient baseline mental and functional. Now Improving  Treat  UTI now on both fluconazole and Ceftriaxone Cr improving 6.4=>5.2=>3.7=>2.7  Today pending labs   -- post ATN diuresis - Monitor for over diuresis      2. HTN (I10) - at range   3. Anemia    Recent Labs   Lab 02/05/22  0308 02/05/22  0527 02/09/22  1124   HGB 11.2* 11.3* 10.3*   HCT 33.7* 34.9* 34.2*    175 177       Iron   Lab Results   Component Value Date    IRON 92 06/10/2021    TIBC 330 06/10/2021    FERRITIN 121 06/10/2021       4. MBD (E88.9 M90.80) -  Recent Labs   Lab  02/08/22  0623 02/09/22  1124 02/10/22  1637   CALCIUM 8.8   < > 9.4   PHOS 4.4  --   --     < > = values in this interval not displayed.     Recent Labs   Lab 02/06/22  0506 02/07/22  0557 02/09/22  1124   MG 1.9 1.9 1.9   Sevelamer     Lab Results   Component Value Date    CALCIUM 9.4 02/10/2022    PHOS 4.4 02/08/2022     Lab Results   Component Value Date    [id number] 18 (L) 10/19/2020   calcitriol     Lab Results   Component Value Date    CO2 27 02/10/2022       5. Acidosis  - stop  PO bicarb   6. Nutrition/Hypoalbuminemia (E88.09) -   Recent Labs   Lab 02/08/22  0623   ALBUMIN 3.0*     Nepro with meals TID. Renal vitamins daily      Thank you for the consult, will follow  With any question please call [phone]  Fernando Booker MD    [facility]  NIMA Wheeler MD, FACNIEVES BLAND MD,   MD ELIS Gutierrez MD E. V. Harmon, NP    [address]  [place], [address] [phone]

## 2022-02-11 NOTE — PLAN OF CARE
Alexander Portillo at [facility], unable to get into [facility]; requesting SNF referral to be faxed to [phone]    [place] faxed SNF referral to [phone]       02/11/22 1251   Post-Acute Status   Post-Acute Authorization Placement   Post-Acute Placement Status Referrals Sent   Discharge Plan   Discharge Plan A Skilled Nursing Facility

## 2022-02-11 NOTE — SUBJECTIVE & OBJECTIVE
Interval History: Mental status back to baseline. Kidney function continues to improve. Discharge planning to SNF.    Review of Systems   Constitutional: Negative for appetite change and fever.   Respiratory: Negative for shortness of breath.    Cardiovascular: Negative for chest pain.   Neurological: Positive for weakness.   Psychiatric/Behavioral: Negative for sleep disturbance.     Objective:     Vital Signs (Most Recent):  Temp: 98.8 °F (37.1 °C) (02/11/22 1116)  Pulse: (!) 49 (02/11/22 1225)  Resp: 18 (02/11/22 1116)  BP: (!) 102/55 (02/11/22 1116)  SpO2: 98 % (02/11/22 1208) Vital Signs (24h Range):  Temp:  [97.6 °F (36.4 °C)-98.8 °F (37.1 °C)] 98.8 °F (37.1 °C)  Pulse:  [43-55] 49  Resp:  [18] 18  SpO2:  [89 %-100 %] 98 %  BP: ()/(53-58) 102/55     Weight: 94.5 kg (208 lb 5.4 oz)  Body mass index is 35.76 kg/m².    Intake/Output Summary (Last 24 hours) at 2/11/2022 1400  Last data filed at 2/11/2022 1200  Gross per 24 hour   Intake 720 ml   Output 3050 ml   Net -2330 ml      Physical Exam  Vitals and nursing note reviewed.   Constitutional:       General: She is not in acute distress.     Appearance: She is ill-appearing.   HENT:      Head: Normocephalic and atraumatic.      Nose: Nose normal.      Mouth/Throat:      Mouth: Mucous membranes are moist.   Eyes:      Pupils: Pupils are equal, round, and reactive to light.   Cardiovascular:      Rate and Rhythm: Normal rate and regular rhythm.      Pulses: Normal pulses.      Heart sounds: Normal heart sounds.   Pulmonary:      Effort: Pulmonary effort is normal.      Breath sounds: Normal breath sounds.   Abdominal:      General: Bowel sounds are normal.      Palpations: Abdomen is soft.      Comments: Suprapubic catheter   Musculoskeletal:         General: Tenderness present. Normal range of motion.      Cervical back: Normal range of motion.      Right lower leg: Edema present.      Left lower leg: Edema present.   Skin:     General: Skin is warm and  dry.   Neurological:      Mental Status: She is alert and oriented to person, place, and time.      Motor: Weakness present.      Comments: R facial droop, chronic per patient   Psychiatric:         Behavior: Behavior normal.         Significant Labs: All pertinent labs within the past 24 hours have been reviewed.    Significant Imaging: I have reviewed all pertinent imaging results/findings within the past 24 hours.

## 2022-02-11 NOTE — PROGRESS NOTES
Nephrology Progress Note     Consult Requested By: Braydon Martel MD  Reason for Consult: CARITO    SUBJECTIVE:      ?    Review of Systems   Constitutional: Positive for malaise/fatigue. Negative for chills and fever.   HENT: Negative for congestion and sore throat.    Eyes: Negative for blurred vision, double vision and photophobia.   Respiratory: Negative for cough and shortness of breath.    Cardiovascular: Negative for chest pain, palpitations and leg swelling.   Gastrointestinal: Negative for abdominal pain, diarrhea, nausea and vomiting.   Genitourinary: Negative for dysuria and urgency.   Musculoskeletal: Negative for joint pain and myalgias.   Skin: Negative for itching and rash.   Neurological: Positive for weakness. Negative for dizziness, sensory change and headaches.   Endo/Heme/Allergies: Negative for polydipsia. Does not bruise/bleed easily.   Psychiatric/Behavioral: Positive for memory loss. Negative for depression. The patient is nervous/anxious and has insomnia.        Past Medical History:   Diagnosis Date    Anticoagulant long-term use     Anxiety     Arthritis     Bilateral lower extremity edema     severe chronic    Carotid artery occlusion     Cataract     CHF (congestive heart failure)     Coronary artery disease     subtotalled LAD with collateral    Depression     Fever blister     Hypothyroid     Iron deficiency anemia     Lumbar radiculopathy     with chronic pain    Ocular migraine     Renal disorder     Sleep apnea     cpap     Past Surgical History:   Procedure Laterality Date    ADENOIDECTOMY      BACK SURGERY      x 12    CARDIAC CATHETERIZATION  2016    subtotalled LAD with right to left collaterals    CATARACT EXTRACTION W/  INTRAOCULAR LENS IMPLANT Left     Dr Coleman     CYSTOSCOPIC LITHOLAPAXY N/A 6/27/2019    Procedure: CYSTOLITHOLAPAXY;  Surgeon: Shireen Mayo MD;  Location: [facility] OR [address] [place] [address];  Service: Urology;  Laterality: N/A;    CYSTOSCOPIC  LITHOLAPAXY N/A 9/3/2019    Procedure: CYSTOLITHOLAPAXY;  Surgeon: Shireen Mayo MD;  Location: [facility] OR 2ND FLR;  Service: Urology;  Laterality: N/A;    CYSTOSCOPY N/A 7/13/2021    Procedure: CYSTOSCOPY;  Surgeon: Shireen Mayo MD;  Location: [facility] OR 1ST FLR;  Service: Urology;  Laterality: N/A;    CYSTOSCOPY  11/16/2021    Procedure: CYSTOSCOPY;  Surgeon: Shireen Mayo MD;  Location: [facility] OR 1ST FLR;  Service: Urology;;    ESOPHAGOGASTRODUODENOSCOPY N/A 5/23/2018    Procedure: ESOPHAGOGASTRODUODENOSCOPY (EGD);  Surgeon: Prince Vance MD;  Location: [facility] (4TH FLR);  Service: Endoscopy;  Laterality: N/A;  r/s 'd per Dr. Vance due to family emergency- ER    HYSTERECTOMY  1975    endometriosis    INJECTION OF BOTULINUM TOXIN TYPE A  7/13/2021    Procedure: INJECTION, BOTULINUM TOXIN, 200units;  Surgeon: Shireen Mayo MD;  Location: [facility] OR 1ST FLR;  Service: Urology;;    INJECTION OF BOTULINUM TOXIN TYPE A  11/16/2021    Procedure: INJECTION, BOTULINUM TOXIN, 200units;  Surgeon: Shireen Mayo MD;  Location: [facility] OR 1ST FLR;  Service: Urology;;    pain pump placement      SQ Dilaudid Pump managed by Dr. Hillman, Pain Management    REPLACEMENT OF CATHETER N/A 10/31/2019    Procedure: REPLACEMENT, CATHETER-SUPRAPUBIC;  Surgeon: Shireen Mayo MD;  [facility];  Service: Urology;  Laterality: N/A;    SPINAL CORD STIMULATOR REMOVAL      before [place]    SPINE SURGERY  5-13-13    CERVICAL FUSION    TONSILLECTOMY       Family History   Problem Relation Age of Onset    Cancer Mother 55        breast    Cancer Father         esophagus,had laryngectomy    Esophageal cancer Father     Parkinsonism Maternal Grandmother     Tremor Maternal Grandmother     No Known Problems Brother     No Known Problems Brother     Heart disease Maternal Uncle     Colon cancer Maternal Uncle         Less than 60    No Known Problems Sister     No Known Problems Maternal Aunt      Cirrhosis Paternal Aunt         ETOH    Liver disease Paternal Aunt         ETOH    Liver disease Paternal Uncle         ETOH    Cirrhosis Paternal Uncle         ETOH    No Known Problems Maternal Grandfather     No Known Problems Paternal Grandmother     No Known Problems Paternal Grandfather     Melanoma Neg Hx     Amblyopia Neg Hx     Blindness Neg Hx     Cataracts Neg Hx     Diabetes Neg Hx     Glaucoma Neg Hx     Hypertension Neg Hx     Macular degeneration Neg Hx     Retinal detachment Neg Hx     Strabismus Neg Hx     Stroke Neg Hx     Thyroid disease Neg Hx     Celiac disease Neg Hx     Colon polyps Neg Hx     Cystic fibrosis Neg Hx     Crohn's disease Neg Hx     Inflammatory bowel disease Neg Hx     Liver cancer Neg Hx     Rectal cancer Neg Hx     Stomach cancer Neg Hx     Ulcerative colitis Neg Hx     Lymphoma Neg Hx      Social History     Tobacco Use    Smoking status: Never Smoker    Smokeless tobacco: Never Used   Substance Use Topics    Alcohol use: Never    Drug use: No       Review of patient's allergies indicates:   Allergen Reactions    (d)-limonene flavor      Other reaction(s): difficult intubation  Other reaction(s): Difficulty breathing    Bactrim [sulfamethoxazole-trimethoprim] Anaphylaxis    Benadryl [diphenhydramine hcl] Shortness Of Breath    Fentanyl Itching, Nausea And Vomiting and Swelling             Imitrex [sumatriptan succinate] Shortness Of Breath    Topamax [topiramate] Shortness Of Breath    Vancomycin Shortness Of Breath     Rash    Butorphanol tartrate     Darvocet a500 [propoxyphene n-acetaminophen]      Other reaction(s): Difficulty breathing    White petrolatum-zinc     Zinc oxide-white petrolatum      Other reaction(s): Difficulty breathing    Latex, natural rubber Itching and Rash    Phenytoin Rash and Other (See Comments)     Trouble breathing            OBJECTIVE:     Vital Signs (Most Recent)  Vitals:    02/10/22 0900  02/10/22 1059 02/10/22 1200 02/10/22 1525   BP:  (!) 97/55  (!) 102/58   Patient Position:  Lying  Lying   Pulse:  (!) 43 61 (!) 55   Resp:  18  18   Temp:  98.5 °F (36.9 °C)  97.6 °F (36.4 °C)   TempSrc:  Oral  Oral   SpO2: 97% 96%  97%   Weight:       Height:             Date 02/10/22 0700 - 02/11/22 0659   Shift 5492-2003 1810-6779 7156-7505 24 Hour Total   INTAKE   P.O. 480 240  720   Shift Total(mL/kg) 480(4.9) 240(2.4)  720(7.3)   OUTPUT   Urine(mL/kg/hr)  950  950   Shift Total(mL/kg)  950(9.7)  950(9.7)   Weight (kg) 98 98 98 98           Medications:   aspirin  81 mg Oral Daily    atorvastatin  80 mg Oral Daily    FLUoxetine  60 mg Oral Daily    heparin (porcine)  5,000 Units Subcutaneous Q8H    levothyroxine  125 mcg Oral Before breakfast    oxybutynin  15 mg Oral Daily    pantoprazole  40 mg Oral Daily    polyethylene glycol  17 g Oral Daily    QUEtiapine  50 mg Oral QHS    senna  2 tablet Oral BID    sevelamer carbonate  800 mg Oral TID WM           Physical Exam  Vitals and nursing note reviewed.   Constitutional:       General: She is not in acute distress.     Appearance: She is obese. She is ill-appearing. She is not diaphoretic.   HENT:      Head: Normocephalic and atraumatic.      Mouth/Throat:      Pharynx: No oropharyngeal exudate.   Eyes:      General: No scleral icterus.     Conjunctiva/sclera: Conjunctivae normal.      Pupils: Pupils are equal, round, and reactive to light.   Cardiovascular:      Rate and Rhythm: Normal rate and regular rhythm.      Heart sounds: Normal heart sounds. No murmur heard.      Pulmonary:      Effort: Pulmonary effort is normal. No respiratory distress.      Breath sounds: Normal breath sounds.   Abdominal:      General: Bowel sounds are normal. There is no distension.      Palpations: Abdomen is soft.      Tenderness: There is no abdominal tenderness.   Genitourinary:     Comments: Suprapubic catheter   Musculoskeletal:         General: Normal range of  motion.      Cervical back: Normal range of motion and neck supple.   Skin:     General: Skin is warm and dry.      Findings: No erythema.   Neurological:      Mental Status: She is alert and oriented to person, place, and time.      Cranial Nerves: No cranial nerve deficit.   Psychiatric:         Mood and Affect: Affect normal.         Cognition and Memory: Memory normal.         Judgment: Judgment normal.         Laboratory:  Recent Labs   Lab 02/05/22  0308 02/05/22  0308 02/05/22  0527 02/09/22  1124   WBC 5.42  --  5.61 5.48   HGB 11.2*  --  11.3* 10.3*   HCT 33.7*  --  34.9* 34.2*     --  175 177   MONO 12.0  0.7   < > 10.9  0.6 9.1  0.5    < > = values in this interval not displayed.     Recent Labs   Lab 02/06/22  0506 02/06/22  0506 02/07/22  0557 02/07/22  0557 02/08/22  0623 02/09/22  1124 02/10/22  1637      < > 140   < > 136 139 139   K 3.9   < > 4.2   < > 4.2 4.4 5.4*   CL 97   < > 99   < > 98 100 100   CO2 27   < > 24   < > 28 27 27   BUN 24*   < > 23   < > 21 18 15   CREATININE 7.2*   < > 6.4*   < > 5.2* 3.7* 2.7*   CALCIUM 8.0*   < > 8.1*   < > 8.8 9.4 9.4   PHOS 5.5*  --  5.3*  --  4.4  --   --     < > = values in this interval not displayed.       Diagnostic Results:  X-Ray: Reviewed  US: Reviewed  Echo: Reviewed  ASSESSMENT/PLAN:     1. CARITO/Acidosis/Hyponatremia   - as of now ATN due to UTI, no obstruction,  US/CT kidney noncontributory   -- Cr  up 7.2-7.4  unknown patient baseline mental and functional. Now Improving  Treat  UTI now on both fluconazole and Ceftriaxone Cr improving 6.4=>5.2=>3.7=>2.7  today post ATN diuresis   -- Monitor      2. HTN (I10) - at range   3. Anemia    Recent Labs   Lab 02/05/22  0308 02/05/22  0527 02/09/22  1124   HGB 11.2* 11.3* 10.3*   HCT 33.7* 34.9* 34.2*    175 177       Iron   Lab Results   Component Value Date    IRON 92 06/10/2021    TIBC 330 06/10/2021    FERRITIN 121 06/10/2021       4. MBD (E88.9 M90.80) -  Recent Labs   Lab  02/08/22  0623 02/09/22  1124 02/10/22  1637   CALCIUM 8.8   < > 9.4   PHOS 4.4  --   --     < > = values in this interval not displayed.     Recent Labs   Lab 02/06/22  0506 02/07/22  0557 02/09/22  1124   MG 1.9 1.9 1.9   Sevelamer     Lab Results   Component Value Date    CALCIUM 9.4 02/10/2022    PHOS 4.4 02/08/2022     Lab Results   Component Value Date    PEPWJTCW17LD 18 (L) 10/19/2020   calcitriol     Lab Results   Component Value Date    CO2 27 02/10/2022       5. Acidosis  - stop  PO bicarb   6. Nutrition/Hypoalbuminemia (E88.09) -   Recent Labs   Lab 02/08/22  0623   ALBUMIN 3.0*     Nepro with meals TID. Renal vitamins daily      Thank you for the consult, will follow  With any question please call [phone]  Fernando Booker MD    [facility]  NIMA Wheeler MD, FACNIEVES BLAND MD,   MD ELIS Gutierrez MD E. V. Harmon, NP    [address]  [place], [address] [phone]

## 2022-02-11 NOTE — ASSESSMENT & PLAN NOTE
Recurrent UTI, completed course of cipro 2 days ago  Followed by ID and urology outpatient  Dark, cloudy urine noted. Patient c/o malaise, chills, N/V, weakness, decreased PO intake  Given a dose of rocephin in ED  -switched to cefepime given recent urine culture sensitivities on 1/21  -urine and blood cx pending; also with candida in urine which may be colonization, on fluconazole for now per Nephro  -consult urology for catheter change and eval, performed on 2/4  -completed treatment

## 2022-02-11 NOTE — PLAN OF CARE
Recommendation:   1. Continue current renal diet as tolerated.   2. Continue to provide [employer] Renal BID.   3. Monitor weight/labs.   4. RD to follow and monitor nutrition status    Goals:   Pt intake >/= 75% EEN/EPN by RD follow up    Nutrition Goal Status: new  Communication of RD Recs: other (comment) (POC)      Problem: Oral Intake Inadequate (Acute Kidney Injury/Impairment)  Goal: Optimal Nutrition Intake  Outcome: Ongoing, Progressing

## 2022-02-11 NOTE — ASSESSMENT & PLAN NOTE
BUN 22. Creatinine 7.2, baseline 1.2  Reports poor PO intake, N/V, decreased urine output  Catheter draining  Given 2.5L IV fluids in ED  -initially treated with IV fluids without improvement; will stop  -consult nephrology for eval  -hold torsemide  -renally dose meds  -repeat BMP daily  -cont to improve. Good urine output.  -discharge planning to SNF

## 2022-02-11 NOTE — PLAN OF CARE
TN spoke with pt's daughter Lisa regarding providing additional SNF preferences. [facility], [facility], and [facility] SNF given as preferences per pt's daughter. TN sent referrals.       02/11/22 0902   Post-Acute Status   Post-Acute Authorization Placement   Post-Acute Placement Status Referrals Sent   Discharge Plan   Discharge Plan A Skilled Nursing Facility

## 2022-02-11 NOTE — ASSESSMENT & PLAN NOTE
Contributing Nutrition Diagnosis  Inadequate energy intake    Related to (etiology):   Abdominal pain     Signs and Symptoms (as evidenced by):   Pt reports upper and lower abdominal pain, pt with acute renal failure with tubular necrosis, and LBM 2/2/22.      Interventions:  Collaboration with other providers  [place] Renal BID    Nutrition Diagnosis Status:   Improving, pt had BM on 2/16 and is consuming % of meals

## 2022-02-12 LAB
ALBUMIN SERPL BCP-MCNC: 3.1 G/DL (ref 3.5–5.2)
ANION GAP SERPL CALC-SCNC: 7 MMOL/L (ref 8–16)
BUN SERPL-MCNC: 14 MG/DL (ref 8–23)
CALCIUM SERPL-MCNC: 9.2 MG/DL (ref 8.7–10.5)
CHLORIDE SERPL-SCNC: 101 MMOL/L (ref 95–110)
CO2 SERPL-SCNC: 31 MMOL/L (ref 23–29)
CREAT SERPL-MCNC: 1.8 MG/DL (ref 0.5–1.4)
EST. GFR  (AFRICAN AMERICAN): 34 ML/MIN/1.73 M^2
EST. GFR  (NON AFRICAN AMERICAN): 29 ML/MIN/1.73 M^2
GLUCOSE SERPL-MCNC: 76 MG/DL (ref 70–110)
PHOSPHATE SERPL-MCNC: 3 MG/DL (ref 2.7–4.5)
POTASSIUM SERPL-SCNC: 4.9 MMOL/L (ref 3.5–5.1)
SODIUM SERPL-SCNC: 139 MMOL/L (ref 136–145)
TB INDURATION 48 - 72 HR READ: 0 MM

## 2022-02-12 PROCEDURE — 94760 N-INVAS EAR/PLS OXIMETRY 1: CPT | Mod: HCNC

## 2022-02-12 PROCEDURE — 93005 ELECTROCARDIOGRAM TRACING: CPT | Mod: HCNC

## 2022-02-12 PROCEDURE — 27000221 HC OXYGEN, UP TO 24 HOURS: Mod: HCNC

## 2022-02-12 PROCEDURE — 63600175 PHARM REV CODE 636 W HCPCS: Mod: HCNC | Performed by: NURSE PRACTITIONER

## 2022-02-12 PROCEDURE — 21400001 HC TELEMETRY ROOM: Mod: HCNC

## 2022-02-12 PROCEDURE — 99900035 HC TECH TIME PER 15 MIN (STAT): Mod: HCNC

## 2022-02-12 PROCEDURE — 94660 CPAP INITIATION&MGMT: CPT | Mod: HCNC

## 2022-02-12 PROCEDURE — 80069 RENAL FUNCTION PANEL: CPT | Mod: HCNC | Performed by: STUDENT IN AN ORGANIZED HEALTH CARE EDUCATION/TRAINING PROGRAM

## 2022-02-12 PROCEDURE — 94761 N-INVAS EAR/PLS OXIMETRY MLT: CPT | Mod: HCNC

## 2022-02-12 PROCEDURE — 36415 COLL VENOUS BLD VENIPUNCTURE: CPT | Mod: HCNC | Performed by: STUDENT IN AN ORGANIZED HEALTH CARE EDUCATION/TRAINING PROGRAM

## 2022-02-12 PROCEDURE — 25000003 PHARM REV CODE 250: Mod: HCNC | Performed by: INTERNAL MEDICINE

## 2022-02-12 PROCEDURE — 93010 ELECTROCARDIOGRAM REPORT: CPT | Mod: HCNC,,, | Performed by: INTERNAL MEDICINE

## 2022-02-12 PROCEDURE — 25000003 PHARM REV CODE 250: Mod: HCNC | Performed by: NURSE PRACTITIONER

## 2022-02-12 PROCEDURE — 94799 UNLISTED PULMONARY SVC/PX: CPT | Mod: HCNC

## 2022-02-12 PROCEDURE — 93010 EKG 12-LEAD: ICD-10-PCS | Mod: HCNC,,, | Performed by: INTERNAL MEDICINE

## 2022-02-12 PROCEDURE — 25000003 PHARM REV CODE 250: Mod: HCNC | Performed by: STUDENT IN AN ORGANIZED HEALTH CARE EDUCATION/TRAINING PROGRAM

## 2022-02-12 RX ORDER — MAG HYDROX/ALUMINUM HYD/SIMETH 200-200-20
30 SUSPENSION, ORAL (FINAL DOSE FORM) ORAL EVERY 6 HOURS PRN
Status: DISCONTINUED | OUTPATIENT
Start: 2022-02-12 | End: 2022-02-17 | Stop reason: HOSPADM

## 2022-02-12 RX ORDER — HYDROCODONE BITARTRATE AND ACETAMINOPHEN 5; 325 MG/1; MG/1
1 TABLET ORAL EVERY 8 HOURS PRN
Status: DISCONTINUED | OUTPATIENT
Start: 2022-02-12 | End: 2022-02-15

## 2022-02-12 RX ORDER — LACTULOSE 10 G/15ML
20 SOLUTION ORAL 2 TIMES DAILY PRN
Status: DISCONTINUED | OUTPATIENT
Start: 2022-02-12 | End: 2022-02-17 | Stop reason: HOSPADM

## 2022-02-12 RX ORDER — QUETIAPINE FUMARATE 25 MG/1
50 TABLET, FILM COATED ORAL 2 TIMES DAILY
Status: DISCONTINUED | OUTPATIENT
Start: 2022-02-12 | End: 2022-02-17 | Stop reason: HOSPADM

## 2022-02-12 RX ORDER — QUETIAPINE FUMARATE 25 MG/1
25 TABLET, FILM COATED ORAL 2 TIMES DAILY PRN
Status: DISCONTINUED | OUTPATIENT
Start: 2022-02-12 | End: 2022-02-17 | Stop reason: HOSPADM

## 2022-02-12 RX ORDER — QUETIAPINE FUMARATE 100 MG/1
100 TABLET, FILM COATED ORAL NIGHTLY
Status: DISCONTINUED | OUTPATIENT
Start: 2022-02-12 | End: 2022-02-12

## 2022-02-12 RX ORDER — LACTULOSE 10 G/15ML
20 SOLUTION ORAL ONCE
Status: COMPLETED | OUTPATIENT
Start: 2022-02-12 | End: 2022-02-12

## 2022-02-12 RX ORDER — LACTULOSE 10 G/15ML
20 SOLUTION ORAL ONCE
Status: DISCONTINUED | OUTPATIENT
Start: 2022-02-12 | End: 2022-02-12

## 2022-02-12 RX ADMIN — ALUMINUM HYDROXIDE, MAGNESIUM HYDROXIDE, AND SIMETHICONE 30 ML: 200; 200; 20 SUSPENSION ORAL at 11:02

## 2022-02-12 RX ADMIN — STANDARDIZED SENNA CONCENTRATE 2 TABLET: 8.6 TABLET ORAL at 09:02

## 2022-02-12 RX ADMIN — LEVOTHYROXINE SODIUM 125 MCG: 0.03 TABLET ORAL at 05:02

## 2022-02-12 RX ADMIN — HYDROCODONE BITARTRATE AND ACETAMINOPHEN 1 TABLET: 5; 325 TABLET ORAL at 12:02

## 2022-02-12 RX ADMIN — ATORVASTATIN CALCIUM 80 MG: 40 TABLET, FILM COATED ORAL at 09:02

## 2022-02-12 RX ADMIN — QUETIAPINE FUMARATE 50 MG: 25 TABLET ORAL at 12:02

## 2022-02-12 RX ADMIN — SEVELAMER CARBONATE 800 MG: 800 TABLET, FILM COATED ORAL at 07:02

## 2022-02-12 RX ADMIN — TRAZODONE HYDROCHLORIDE 50 MG: 50 TABLET ORAL at 09:02

## 2022-02-12 RX ADMIN — FLUOXETINE HYDROCHLORIDE 60 MG: 20 CAPSULE ORAL at 09:02

## 2022-02-12 RX ADMIN — HEPARIN SODIUM 5000 UNITS: 5000 INJECTION INTRAVENOUS; SUBCUTANEOUS at 01:02

## 2022-02-12 RX ADMIN — HEPARIN SODIUM 5000 UNITS: 5000 INJECTION INTRAVENOUS; SUBCUTANEOUS at 05:02

## 2022-02-12 RX ADMIN — OXYBUTYNIN CHLORIDE 15 MG: 5 TABLET, EXTENDED RELEASE ORAL at 09:02

## 2022-02-12 RX ADMIN — HEPARIN SODIUM 5000 UNITS: 5000 INJECTION INTRAVENOUS; SUBCUTANEOUS at 09:02

## 2022-02-12 RX ADMIN — SEVELAMER CARBONATE 800 MG: 800 TABLET, FILM COATED ORAL at 11:02

## 2022-02-12 RX ADMIN — LACTULOSE 20 G: 10 SOLUTION ORAL at 12:02

## 2022-02-12 RX ADMIN — PANTOPRAZOLE SODIUM 40 MG: 40 TABLET, DELAYED RELEASE ORAL at 09:02

## 2022-02-12 RX ADMIN — QUETIAPINE FUMARATE 50 MG: 25 TABLET ORAL at 09:02

## 2022-02-12 RX ADMIN — SEVELAMER CARBONATE 800 MG: 800 TABLET, FILM COATED ORAL at 05:02

## 2022-02-12 RX ADMIN — ASPIRIN 81 MG: 81 TABLET, COATED ORAL at 09:02

## 2022-02-12 NOTE — PROGRESS NOTES
[facility] Medicine  Progress Note    Patient Name: Tasha Hawley  MRN: [id number]  Patient Class: IP- Inpatient   Admission Date: 2/3/2022  Length of Stay: 9 days  Attending Physician: Braydon Martel MD  Primary Care Provider: Mesfin Hodges Ii, MD        Subjective:     Principal Problem:Acute renal failure with tubular necrosis        HPI:  Tasha Hawley is a 67 yo female with a pmh of neurogenic bladder, suprapubic catheter,recurrent UTI, chronic pain with epidural pain pump, CAD, hypothyroidism, sleep apnea. She c/o malaise, poor PO intake, N/V, chills, decreased urine output, weakness, chronic pain. She was seen by ID today and noted to have cloudy urine. She just finished a course of cipro and has recurrent UTIs. She states she has not been feeling well for a few weeks. She feels as though she is leaking urine from her urethra. She has home health with [facility] and states they changed her catheter 2 days ago (no record on chart) and she is not sure if a urine sample was obtained at that time. She has chronic swelling to her lower legs and walks with a walker. She fell a few days ago with no injury.       Overview/Hospital Course:  No notes on file    Interval History: Pt c/o back/abdominal pain as well as anxiety. States she hasn't had a BM in several days. Denies N/V. Pending discharge to SNF 2/14.    Review of Systems   Constitutional: Negative for appetite change and fever.   Respiratory: Negative for shortness of breath.    Cardiovascular: Negative for chest pain.   Gastrointestinal: Positive for abdominal pain and constipation. Negative for diarrhea, nausea and vomiting.   Neurological: Positive for weakness.   Psychiatric/Behavioral: Negative for sleep disturbance. The patient is nervous/anxious.      Objective:     Vital Signs (Most Recent):  Temp: 98.1 °F (36.7 °C) (02/12/22 0805)  Pulse: (!) 48 (02/12/22 1151)  Resp: 16 (02/12/22 1212)  BP: (!) 97/52 (02/12/22 0805)  SpO2: (!) 93  % (02/12/22 1134) Vital Signs (24h Range):  Temp:  [98.1 °F (36.7 °C)] 98.1 °F (36.7 °C)  Pulse:  [42-67] 48  Resp:  [16-20] 16  SpO2:  [93 %-100 %] 93 %  BP: ()/(52-70) 97/52     Weight: 96.3 kg (212 lb 4.9 oz)  Body mass index is 36.44 kg/m².    Intake/Output Summary (Last 24 hours) at 2/12/2022 1217  Last data filed at 2/12/2022 0500  Gross per 24 hour   Intake 240 ml   Output 3075 ml   Net -2835 ml      Physical Exam  Vitals and nursing note reviewed.   Constitutional:       General: She is not in acute distress.     Appearance: She is ill-appearing.   HENT:      Head: Normocephalic and atraumatic.      Nose: Nose normal.      Mouth/Throat:      Mouth: Mucous membranes are moist.   Eyes:      Pupils: Pupils are equal, round, and reactive to light.   Cardiovascular:      Rate and Rhythm: Normal rate and regular rhythm.      Pulses: Normal pulses.      Heart sounds: Normal heart sounds.   Pulmonary:      Effort: Pulmonary effort is normal.      Breath sounds: Normal breath sounds.   Abdominal:      General: Bowel sounds are normal.      Palpations: Abdomen is soft.      Comments: Suprapubic catheter   Musculoskeletal:         General: Tenderness present. Normal range of motion.      Cervical back: Normal range of motion.      Right lower leg: Edema present.      Left lower leg: Edema present.   Skin:     General: Skin is warm and dry.   Neurological:      Mental Status: She is alert and oriented to person, place, and time.      Motor: Weakness present.      Comments: R facial droop, chronic per patient   Psychiatric:         Behavior: Behavior normal.         Significant Labs: All pertinent labs within the past 24 hours have been reviewed.    Significant Imaging: I have reviewed all pertinent imaging results/findings within the past 24 hours.      Assessment/Plan:      * Acute renal failure with tubular necrosis  BUN 22. Creatinine 7.2, baseline 1.2  Reports poor PO intake, N/V, decreased urine  output  Catheter draining  Given 2.5L IV fluids in ED  -initially treated with IV fluids without improvement; will stop  -consult nephrology for eval  -hold torsemide  -renally dose meds  -repeat BMP daily  -cont to improve. Good urine output.  -discharge planning to [facility]    Abdominal pain  - hyoscyamine prn added  - PPI  - Maalox PRN  - bowel regimen  - xr abd    Anxiety  - hydroxyzine held due to lethargy  - Increase seroquel to 50mg BID  - Trazodone and seroquel PRN    Hyponatremia  Na 129, baseline 139  In setting of CARITO and poor PO intake  -given 2.5L LR in ED  -cont LR   -monitor labs  -hold torsemide  -nephrology consulted    Bilateral lower extremity edema  Chronic  Hold torsemide for renal failure      Urinary tract infection associated with cystostomy catheter  Recurrent UTI, completed course of cipro 2 days ago  Followed by ID and urology outpatient  Dark, cloudy urine noted. Patient c/o malaise, chills, N/V, weakness, decreased PO intake  Given a dose of rocephin in ED  -switched to cefepime given recent urine culture sensitivities on 1/21  -urine and blood cx pending; also with candida in urine which may be colonization, on fluconazole for now per Nephro  -consult urology for catheter change and eval, performed on 2/4  -completed treatment           Chronic diastolic heart failure  Denies SOB, does have chronic BLE  No respiratory distress noted on room air  -chest XRAY with bilateral atelectasis; incentive spirometry  -hold torsemide 2/2 renal failure      Bradycardia  Chronic  EKG not in chart  HR 40s-50s, asymptomatic  Cardiac monitoring      Primary hypothyroidism  Cont synthroid      Frequent falls  Consult PT/OT for eval: recommend SNF      Neurogenic bladder  Has chronic indwelling meadows  -cont ditropan  -discontinue zanaflex        Chronic pain syndrome  Has dilaudid intrathecal pump  -bowel regimen  -monitor closely       Major depressive disorder, recurrent, mild  Cont prozac      Sleep  apnea  Non-compliant with CPAP at home  O2 sat drops while sleeping  -CPAP at night        VTE Risk Mitigation (From admission, onward)         Ordered     heparin (porcine) injection 5,000 Units  Every 8 hours         02/03/22 1734     IP VTE HIGH RISK PATIENT  Once         02/03/22 1734     Place sequential compression device  Until discontinued         02/03/22 1734                Discharge Planning   JACKIE: 2/8/2022     Code Status: Full Code   Is the patient medically ready for discharge?:     Reason for patient still in hospital (select all that apply): Pending disposition  Discharge Plan A: Skilled Nursing Facility                  Braydon Martel MD  Department of Hospital Medicine   [facility]

## 2022-02-12 NOTE — SUBJECTIVE & OBJECTIVE
Interval History: Pt c/o back/abdominal pain as well as anxiety. States she hasn't had a BM in several days. Denies N/V. Pending discharge to SNF 2/14.    Review of Systems   Constitutional: Negative for appetite change and fever.   Respiratory: Negative for shortness of breath.    Cardiovascular: Negative for chest pain.   Gastrointestinal: Positive for abdominal pain and constipation. Negative for diarrhea, nausea and vomiting.   Neurological: Positive for weakness.   Psychiatric/Behavioral: Negative for sleep disturbance. The patient is nervous/anxious.      Objective:     Vital Signs (Most Recent):  Temp: 98.1 °F (36.7 °C) (02/12/22 0805)  Pulse: (!) 48 (02/12/22 1151)  Resp: 16 (02/12/22 1212)  BP: (!) 97/52 (02/12/22 0805)  SpO2: (!) 93 % (02/12/22 1134) Vital Signs (24h Range):  Temp:  [98.1 °F (36.7 °C)] 98.1 °F (36.7 °C)  Pulse:  [42-67] 48  Resp:  [16-20] 16  SpO2:  [93 %-100 %] 93 %  BP: ()/(52-70) 97/52     Weight: 96.3 kg (212 lb 4.9 oz)  Body mass index is 36.44 kg/m².    Intake/Output Summary (Last 24 hours) at 2/12/2022 1217  Last data filed at 2/12/2022 0500  Gross per 24 hour   Intake 240 ml   Output 3075 ml   Net -2835 ml      Physical Exam  Vitals and nursing note reviewed.   Constitutional:       General: She is not in acute distress.     Appearance: She is ill-appearing.   HENT:      Head: Normocephalic and atraumatic.      Nose: Nose normal.      Mouth/Throat:      Mouth: Mucous membranes are moist.   Eyes:      Pupils: Pupils are equal, round, and reactive to light.   Cardiovascular:      Rate and Rhythm: Normal rate and regular rhythm.      Pulses: Normal pulses.      Heart sounds: Normal heart sounds.   Pulmonary:      Effort: Pulmonary effort is normal.      Breath sounds: Normal breath sounds.   Abdominal:      General: Bowel sounds are normal.      Palpations: Abdomen is soft.      Comments: Suprapubic catheter   Musculoskeletal:         General: Tenderness present. Normal range of  motion.      Cervical back: Normal range of motion.      Right lower leg: Edema present.      Left lower leg: Edema present.   Skin:     General: Skin is warm and dry.   Neurological:      Mental Status: She is alert and oriented to person, place, and time.      Motor: Weakness present.      Comments: R facial droop, chronic per patient   Psychiatric:         Behavior: Behavior normal.         Significant Labs: All pertinent labs within the past 24 hours have been reviewed.    Significant Imaging: I have reviewed all pertinent imaging results/findings within the past 24 hours.

## 2022-02-12 NOTE — CONSULTS
Thank you for your consult to [facility]. We have reviewed the patient chart. This patient does meet criteria for [facility] service at this time. Will assume care on 02/13/22 at 6AM.    Signing Physician:     Mel Hebert MD  Cell: [phone]  [facility]

## 2022-02-13 LAB
ALBUMIN SERPL BCP-MCNC: 3 G/DL (ref 3.5–5.2)
ANION GAP SERPL CALC-SCNC: 5 MMOL/L (ref 8–16)
BUN SERPL-MCNC: 12 MG/DL (ref 8–23)
CALCIUM SERPL-MCNC: 8.4 MG/DL (ref 8.7–10.5)
CHLORIDE SERPL-SCNC: 102 MMOL/L (ref 95–110)
CO2 SERPL-SCNC: 31 MMOL/L (ref 23–29)
CREAT SERPL-MCNC: 1.6 MG/DL (ref 0.5–1.4)
EST. GFR  (AFRICAN AMERICAN): 39 ML/MIN/1.73 M^2
EST. GFR  (NON AFRICAN AMERICAN): 34 ML/MIN/1.73 M^2
GLUCOSE SERPL-MCNC: 84 MG/DL (ref 70–110)
PHOSPHATE SERPL-MCNC: 2.8 MG/DL (ref 2.7–4.5)
POTASSIUM SERPL-SCNC: 5.5 MMOL/L (ref 3.5–5.1)
SODIUM SERPL-SCNC: 138 MMOL/L (ref 136–145)

## 2022-02-13 PROCEDURE — 25000003 PHARM REV CODE 250: Mod: HCNC | Performed by: INTERNAL MEDICINE

## 2022-02-13 PROCEDURE — 94760 N-INVAS EAR/PLS OXIMETRY 1: CPT | Mod: HCNC

## 2022-02-13 PROCEDURE — 80069 RENAL FUNCTION PANEL: CPT | Mod: HCNC | Performed by: STUDENT IN AN ORGANIZED HEALTH CARE EDUCATION/TRAINING PROGRAM

## 2022-02-13 PROCEDURE — 99900035 HC TECH TIME PER 15 MIN (STAT): Mod: HCNC

## 2022-02-13 PROCEDURE — 25000003 PHARM REV CODE 250: Mod: HCNC | Performed by: STUDENT IN AN ORGANIZED HEALTH CARE EDUCATION/TRAINING PROGRAM

## 2022-02-13 PROCEDURE — 63600175 PHARM REV CODE 636 W HCPCS: Mod: HCNC | Performed by: NURSE PRACTITIONER

## 2022-02-13 PROCEDURE — 94660 CPAP INITIATION&MGMT: CPT | Mod: HCNC

## 2022-02-13 PROCEDURE — 36415 COLL VENOUS BLD VENIPUNCTURE: CPT | Mod: HCNC | Performed by: STUDENT IN AN ORGANIZED HEALTH CARE EDUCATION/TRAINING PROGRAM

## 2022-02-13 PROCEDURE — 21400001 HC TELEMETRY ROOM: Mod: HCNC

## 2022-02-13 PROCEDURE — 94761 N-INVAS EAR/PLS OXIMETRY MLT: CPT | Mod: HCNC

## 2022-02-13 PROCEDURE — 94799 UNLISTED PULMONARY SVC/PX: CPT | Mod: HCNC

## 2022-02-13 PROCEDURE — 25000003 PHARM REV CODE 250: Mod: HCNC | Performed by: NURSE PRACTITIONER

## 2022-02-13 PROCEDURE — 63600175 PHARM REV CODE 636 W HCPCS: Mod: HCNC | Performed by: HOSPITALIST

## 2022-02-13 RX ORDER — ENOXAPARIN SODIUM 100 MG/ML
40 INJECTION SUBCUTANEOUS EVERY 24 HOURS
Status: DISCONTINUED | OUTPATIENT
Start: 2022-02-13 | End: 2022-02-17 | Stop reason: HOSPADM

## 2022-02-13 RX ADMIN — QUETIAPINE FUMARATE 50 MG: 25 TABLET ORAL at 09:02

## 2022-02-13 RX ADMIN — STANDARDIZED SENNA CONCENTRATE 2 TABLET: 8.6 TABLET ORAL at 09:02

## 2022-02-13 RX ADMIN — ATORVASTATIN CALCIUM 80 MG: 40 TABLET, FILM COATED ORAL at 08:02

## 2022-02-13 RX ADMIN — TRAZODONE HYDROCHLORIDE 50 MG: 50 TABLET ORAL at 09:02

## 2022-02-13 RX ADMIN — SEVELAMER CARBONATE 800 MG: 800 TABLET, FILM COATED ORAL at 08:02

## 2022-02-13 RX ADMIN — SODIUM ZIRCONIUM CYCLOSILICATE 10 G: 5 POWDER, FOR SUSPENSION ORAL at 03:02

## 2022-02-13 RX ADMIN — QUETIAPINE FUMARATE 50 MG: 25 TABLET ORAL at 08:02

## 2022-02-13 RX ADMIN — ASPIRIN 81 MG: 81 TABLET, COATED ORAL at 08:02

## 2022-02-13 RX ADMIN — ENOXAPARIN SODIUM 40 MG: 100 INJECTION SUBCUTANEOUS at 05:02

## 2022-02-13 RX ADMIN — ONDANSETRON 4 MG: 2 INJECTION INTRAMUSCULAR; INTRAVENOUS at 08:02

## 2022-02-13 RX ADMIN — POLYETHYLENE GLYCOL 3350 17 G: 17 POWDER, FOR SOLUTION ORAL at 08:02

## 2022-02-13 RX ADMIN — STANDARDIZED SENNA CONCENTRATE 2 TABLET: 8.6 TABLET ORAL at 08:02

## 2022-02-13 RX ADMIN — PANTOPRAZOLE SODIUM 40 MG: 40 TABLET, DELAYED RELEASE ORAL at 08:02

## 2022-02-13 RX ADMIN — LEVOTHYROXINE SODIUM 125 MCG: 0.03 TABLET ORAL at 05:02

## 2022-02-13 RX ADMIN — FLUOXETINE HYDROCHLORIDE 60 MG: 20 CAPSULE ORAL at 08:02

## 2022-02-13 RX ADMIN — HEPARIN SODIUM 5000 UNITS: 5000 INJECTION INTRAVENOUS; SUBCUTANEOUS at 05:02

## 2022-02-13 RX ADMIN — OXYBUTYNIN CHLORIDE 15 MG: 5 TABLET, EXTENDED RELEASE ORAL at 08:02

## 2022-02-13 NOTE — PLAN OF CARE
Problem: Adult Inpatient Plan of Care  Goal: Plan of Care Review  Outcome: Ongoing, Progressing  Goal: Patient-Specific Goal (Individualized)  Outcome: Ongoing, Progressing     Problem: Renal Function Impairment (Acute Kidney Injury/Impairment)  Goal: Effective Renal Function  Outcome: Ongoing, Progressing     Problem: Obstructive Sleep Apnea Risk or Actual Comorbidity Management  Goal: Unobstructed Breathing During Sleep  Outcome: Ongoing, Progressing     Problem: Urinary Retention  Goal: Effective Urinary Elimination  Outcome: Ongoing, Progressing     Problem: Pain Chronic (Persistent) (Comorbidity Management)  Goal: Acceptable Pain Control and Functional Ability  Outcome: Ongoing, Progressing     AOX4. VS stable. Safety maintained. Meds given per MAR. Denies pain at this time. FARHAD @ bedside. Suprapubic catheter in place draining clear yellow urine. Bipap in place. Resting quietly. SR up X 2. Call light in reach. Bed alarm set. Will continue to monitor.

## 2022-02-14 LAB
ALBUMIN SERPL BCP-MCNC: 2.8 G/DL (ref 3.5–5.2)
ANION GAP SERPL CALC-SCNC: 6 MMOL/L (ref 8–16)
BUN SERPL-MCNC: 12 MG/DL (ref 8–23)
CALCIUM SERPL-MCNC: 8.9 MG/DL (ref 8.7–10.5)
CHLORIDE SERPL-SCNC: 105 MMOL/L (ref 95–110)
CO2 SERPL-SCNC: 28 MMOL/L (ref 23–29)
CREAT SERPL-MCNC: 1.5 MG/DL (ref 0.5–1.4)
EST. GFR  (AFRICAN AMERICAN): 42 ML/MIN/1.73 M^2
EST. GFR  (NON AFRICAN AMERICAN): 36 ML/MIN/1.73 M^2
GLUCOSE SERPL-MCNC: 79 MG/DL (ref 70–110)
PHOSPHATE SERPL-MCNC: 3.6 MG/DL (ref 2.7–4.5)
POTASSIUM SERPL-SCNC: 4.5 MMOL/L (ref 3.5–5.1)
SODIUM SERPL-SCNC: 139 MMOL/L (ref 136–145)

## 2022-02-14 PROCEDURE — 21400001 HC TELEMETRY ROOM: Mod: HCNC

## 2022-02-14 PROCEDURE — 97116 GAIT TRAINING THERAPY: CPT | Mod: HCNC

## 2022-02-14 PROCEDURE — 25000003 PHARM REV CODE 250: Mod: HCNC | Performed by: INTERNAL MEDICINE

## 2022-02-14 PROCEDURE — 99900035 HC TECH TIME PER 15 MIN (STAT): Mod: HCNC

## 2022-02-14 PROCEDURE — 97535 SELF CARE MNGMENT TRAINING: CPT | Mod: HCNC,CO

## 2022-02-14 PROCEDURE — 97530 THERAPEUTIC ACTIVITIES: CPT | Mod: HCNC,CO

## 2022-02-14 PROCEDURE — 94799 UNLISTED PULMONARY SVC/PX: CPT | Mod: HCNC

## 2022-02-14 PROCEDURE — 97530 THERAPEUTIC ACTIVITIES: CPT | Mod: HCNC

## 2022-02-14 PROCEDURE — 25000003 PHARM REV CODE 250: Mod: HCNC | Performed by: NURSE PRACTITIONER

## 2022-02-14 PROCEDURE — 80069 RENAL FUNCTION PANEL: CPT | Mod: HCNC | Performed by: STUDENT IN AN ORGANIZED HEALTH CARE EDUCATION/TRAINING PROGRAM

## 2022-02-14 PROCEDURE — 94761 N-INVAS EAR/PLS OXIMETRY MLT: CPT | Mod: HCNC

## 2022-02-14 PROCEDURE — 36415 COLL VENOUS BLD VENIPUNCTURE: CPT | Mod: HCNC | Performed by: STUDENT IN AN ORGANIZED HEALTH CARE EDUCATION/TRAINING PROGRAM

## 2022-02-14 PROCEDURE — 63600175 PHARM REV CODE 636 W HCPCS: Mod: HCNC | Performed by: HOSPITALIST

## 2022-02-14 PROCEDURE — 94660 CPAP INITIATION&MGMT: CPT | Mod: HCNC

## 2022-02-14 RX ORDER — BISACODYL 5 MG
10 TABLET, DELAYED RELEASE (ENTERIC COATED) ORAL ONCE
Status: DISCONTINUED | OUTPATIENT
Start: 2022-02-14 | End: 2022-02-17 | Stop reason: HOSPADM

## 2022-02-14 RX ORDER — VALACYCLOVIR HYDROCHLORIDE 500 MG/1
1000 TABLET, FILM COATED ORAL 2 TIMES DAILY
Status: DISCONTINUED | OUTPATIENT
Start: 2022-02-14 | End: 2022-02-17 | Stop reason: HOSPADM

## 2022-02-14 RX ORDER — VALACYCLOVIR HYDROCHLORIDE 500 MG/1
1000 TABLET, FILM COATED ORAL 3 TIMES DAILY
Status: DISCONTINUED | OUTPATIENT
Start: 2022-02-14 | End: 2022-02-14

## 2022-02-14 RX ADMIN — ENOXAPARIN SODIUM 40 MG: 100 INJECTION SUBCUTANEOUS at 04:02

## 2022-02-14 RX ADMIN — VALACYCLOVIR HYDROCHLORIDE 1000 MG: 500 TABLET, FILM COATED ORAL at 01:02

## 2022-02-14 RX ADMIN — QUETIAPINE FUMARATE 50 MG: 25 TABLET ORAL at 09:02

## 2022-02-14 RX ADMIN — LEVOTHYROXINE SODIUM 125 MCG: 0.03 TABLET ORAL at 05:02

## 2022-02-14 RX ADMIN — VALACYCLOVIR HYDROCHLORIDE 1000 MG: 500 TABLET, FILM COATED ORAL at 09:02

## 2022-02-14 RX ADMIN — STANDARDIZED SENNA CONCENTRATE 2 TABLET: 8.6 TABLET ORAL at 09:02

## 2022-02-14 NOTE — PLAN OF CARE
Marsha with [facility] management, unable to take patient due to pt having 3 open workman's comp cases.    Per admissions from [facility], unable to accept patient due to patient being on too many antipsychotics       02/14/22 1120   Post-Acute Status   Post-Acute Authorization Placement   Discharge Plan   Discharge Plan A Skilled Nursing Facility

## 2022-02-14 NOTE — ASSESSMENT & PLAN NOTE
- hyoscyamine prn added  - PPI  - Maalox PRN  - bowel regimen  - xr abd-shows constipation.  Try po dulcolax today and reevaluate in am.

## 2022-02-14 NOTE — PLAN OF CARE
Problem: Physical Therapy Goal  Goal: Physical Therapy Goal  Description: Goals to be met by: 3/4/22     Patient will increase functional independence with mobility by performin. Supine <> sit with supervision  2. Sit to stand transfer with supervision  3. Bed to chair transfer with Supervision using Rolling Walker  4. Gait  x 50 feet with Supervision using Rolling Walker.   5. Lower extremity exercise program x 10 reps per handout, with supervision    Outcome: Ongoing, Progressing     Pt progressing well towards goals. Pt performed all bed mobility and transfers w/ CGA. Pt ambulated ~45 ft using RW w/ CGA and one standing rest break. Recommending SNF placement upon d/c.

## 2022-02-14 NOTE — PLAN OF CARE
.[facility]-out of network with pt's insurance.    [facility] SNF-per staff, admissions has left for the day.    [facility] SNF-per staff, admissions has left for the day.       02/14/22 0489   Post-Acute Status   Post-Acute Authorization Placement   Discharge Plan   Discharge Plan A Skilled Nursing Facility

## 2022-02-14 NOTE — SUBJECTIVE & OBJECTIVE
Interval History:    Patient seen and examined today. No acute issues noted. Patient is feeling well and progressing during this hospitalization. VSS, afebrile. Labs reviewed. Cr cheri   RN reported vesicular rash on R buttock , unable to visualize this a photo. Concern for shingles . Will need to further assess the rash when possible      aspirin  81 mg Oral Daily    atorvastatin  80 mg Oral Daily    enoxaparin  40 mg Subcutaneous Daily    FLUoxetine  60 mg Oral Daily    levothyroxine  125 mcg Oral Before breakfast    oxybutynin  15 mg Oral Daily    pantoprazole  40 mg Oral Daily    polyethylene glycol  17 g Oral Daily    QUEtiapine  50 mg Oral BID    senna  2 tablet Oral BID         Review of Systems   Constitutional: Positive for activity change and fatigue. Negative for fever.   HENT: Negative for congestion.    Eyes: Negative for visual disturbance.   Respiratory: Negative for cough, shortness of breath and wheezing.    Cardiovascular: Negative for chest pain, palpitations and leg swelling.   Gastrointestinal: Negative for abdominal distention, abdominal pain, constipation, diarrhea, nausea and vomiting.   Genitourinary: Negative for decreased urine volume and difficulty urinating.   Musculoskeletal: Negative for arthralgias and myalgias.   Neurological: Positive for weakness. Negative for dizziness and syncope.   Psychiatric/Behavioral: Negative for agitation and confusion.     Objective:     Vital Signs (Most Recent):  Temp: 97.5 °F (36.4 °C) (02/13/22 2028)  Pulse: (!) 57 (02/13/22 2028)  Resp: 18 (02/13/22 2028)  BP: (!) 114/57 (02/13/22 2028)  SpO2: 96 % (02/13/22 2028) Vital Signs (24h Range):  Temp:  [97.4 °F (36.3 °C)-98.4 °F (36.9 °C)] 97.5 °F (36.4 °C)  Pulse:  [47-59] 57  Resp:  [18] 18  SpO2:  [90 %-98 %] 96 %  BP: (107-116)/(51-59) 114/57     Weight: 98.5 kg (217 lb 2.5 oz)  Body mass index is 37.27 kg/m².    Intake/Output Summary (Last 24 hours) at 2/13/2022 8789  Last data filed  at 2/13/2022 1800  Gross per 24 hour   Intake 360 ml   Output 1500 ml   Net -1140 ml      Physical Exam  Constitutional:       General: She is not in acute distress.     Appearance: Normal appearance. She is well-developed.   HENT:      Head: Normocephalic and atraumatic.   Eyes:      Pupils: Pupils are equal, round, and reactive to light.   Pulmonary:      Effort: Pulmonary effort is normal. No respiratory distress.   Abdominal:      General: Abdomen is flat. There is no distension.      Palpations: Abdomen is soft.   Musculoskeletal:         General: Normal range of motion.      Cervical back: Neck supple.   Skin:     General: Skin is warm and dry.   Neurological:      General: No focal deficit present.      Mental Status: She is alert and oriented to person, place, and time.   Psychiatric:         Mood and Affect: Mood normal.         Behavior: Behavior normal.         Significant Labs:   All pertinent labs within the past 24 hours have been reviewed.  CBC: No results for input(s): WBC, HGB, HCT, PLT in the last 48 hours.  CMP:   Recent Labs   Lab 02/12/22  0308 02/13/22  0309    138   K 4.9 5.5*    102   CO2 31* 31*   GLU 76 84   BUN 14 12   CREATININE 1.8* 1.6*   CALCIUM 9.2 8.4*   ALBUMIN 3.1* 3.0*   ANIONGAP 7* 5*   EGFRNONAA 29* 34*       Significant Imaging: I have reviewed all pertinent imaging results/findings within the past 24 hours.

## 2022-02-14 NOTE — ASSESSMENT & PLAN NOTE
BUN 22. Creatinine 7.2, baseline 1.2  Reports poor PO intake, N/V, decreased urine output  S/p 2.5L IV fluids in ED  -initially treated with IV fluids without improvement; will stop  -nephrology was consulted   -hold torsemide  -renally dose medications  - cr normalizing   -repeat BMP daily  -cont to improve. Good urine output.  -discharge planning to SNF    - cr normalizing

## 2022-02-14 NOTE — PROGRESS NOTES
[facility] Medicine  Telemedicine Progress Note    Patient Name: Tasha Hawley  MRN: [id number]  Patient Class: IP- Inpatient   Admission Date: 2/3/2022  Length of Stay: 11 days  Attending Physician: Cristiane Villatoro MD  Primary Care Provider: Mesfin Hodges Ii, MD          Subjective:     Principal Problem:Acute renal failure with tubular necrosis        HPI:  Tasha Hawley is a 65 yo female with a pmh of neurogenic bladder, suprapubic catheter,recurrent UTI, chronic pain with epidural pain pump, CAD, hypothyroidism, sleep apnea. She c/o malaise, poor PO intake, N/V, chills, decreased urine output, weakness, chronic pain. She was seen by ID today and noted to have cloudy urine. She just finished a course of cipro and has recurrent UTIs. She states she has not been feeling well for a few weeks. She feels as though she is leaking urine from her urethra. She has home health with [place] and states they changed her catheter 2 days ago (no record on chart) and she is not sure if a urine sample was obtained at that time. She has chronic swelling to her lower legs and walks with a walker. She fell a few days ago with no injury.       Overview/Hospital Course:  No notes on file    Interval History: no acute events overnight.  Awaiting SNF placement.  Creat 1.5 today.    Review of Systems   Constitutional: Positive for activity change and fatigue. Negative for fever.   HENT: Negative for congestion.    Eyes: Negative for visual disturbance.   Respiratory: Negative for cough, shortness of breath and wheezing.    Cardiovascular: Negative for chest pain, palpitations and leg swelling.   Gastrointestinal: Negative for abdominal distention, abdominal pain, constipation, diarrhea, nausea and vomiting.   Genitourinary: Negative for decreased urine volume and difficulty urinating.   Musculoskeletal: Negative for arthralgias and myalgias.   Skin: Positive for rash.   Neurological: Positive for weakness.  Negative for dizziness and syncope.   Psychiatric/Behavioral: Negative for agitation and confusion.     Objective:     Vital Signs (Most Recent):  Temp: 98.5 °F (36.9 °C) (02/14/22 1123)  Pulse: 62 (02/14/22 1200)  Resp: 18 (02/14/22 1123)  BP: (!) 115/57 (02/14/22 1123)  SpO2: (!) 92 % (02/14/22 1200) Vital Signs (24h Range):  Temp:  [97.4 °F (36.3 °C)-98.5 °F (36.9 °C)] 98.5 °F (36.9 °C)  Pulse:  [49-69] 62  Resp:  [18] 18  SpO2:  [83 %-98 %] 92 %  BP: (114-132)/(51-66) 115/57     Weight: 98.5 kg (217 lb 2.5 oz)  Body mass index is 37.27 kg/m².    Intake/Output Summary (Last 24 hours) at 2/14/2022 1308  Last data filed at 2/14/2022 1200  Gross per 24 hour   Intake 600 ml   Output 2400 ml   Net -1800 ml      Physical Exam  Constitutional:       General: She is not in acute distress.     Appearance: Normal appearance. She is well-developed.   HENT:      Head: Normocephalic and atraumatic.   Eyes:      Pupils: Pupils are equal, round, and reactive to light.   Pulmonary:      Effort: Pulmonary effort is normal. No respiratory distress.   Abdominal:      General: Abdomen is flat. There is no distension.      Palpations: Abdomen is soft.   Musculoskeletal:         General: Normal range of motion.      Cervical back: Neck supple.   Skin:     General: Skin is warm and dry.   Neurological:      General: No focal deficit present.      Mental Status: She is alert and oriented to person, place, and time.   Psychiatric:         Mood and Affect: Mood normal.         Behavior: Behavior normal.         Significant Labs:   All pertinent labs within the past 24 hours have been reviewed.  BMP:   Recent Labs   Lab 02/14/22  0630   GLU 79      K 4.5      CO2 28   BUN 12   CREATININE 1.5*   CALCIUM 8.9     CBC: No results for input(s): WBC, HGB, HCT, PLT in the last 48 hours.    Significant Imaging: I have reviewed all pertinent imaging results/findings within the past 24 hours.      Assessment/Plan:      * Acute renal  failure with tubular necrosis  BUN 22. Creatinine 7.2, baseline 1.2  Reports poor PO intake, N/V, decreased urine output  S/p 2.5L IV fluids in ED  -initially treated with IV fluids without improvement; will stop  -nephrology was consulted   -hold torsemide  -renally dose medications  - cr normalizing   -repeat BMP daily  -cont to improve. Good urine output.  -discharge planning to SNF    - cr normalizing     Abdominal pain  - hyoscyamine prn added  - PPI  - Maalox PRN  - bowel regimen  - xr abd-shows constipation.  Try po dulcolax today and reevaluate in am.    Anxiety  - hydroxyzine held due to lethargy  - Increase seroquel to 50mg BID  - Trazodone and seroquel PRN    Hyponatremia  Na 129, baseline 139  In setting of CARITO and poor PO intake  -given 2.5L LR in ED  -cont LR   -monitor labs  -hold torsemide  -nephrology consulted  -resolved    Bilateral lower extremity edema  Chronic  Hold torsemide for renal failure      Urinary tract infection associated with cystostomy catheter  Recurrent UTI, completed course of cipro 2 days ago  Followed by ID and urology outpatient  Dark, cloudy urine noted. Patient c/o malaise, chills, N/V, weakness, decreased PO intake  Given a dose of rocephin in ED  -switched to cefepime given recent urine culture sensitivities on 1/21  -urine and blood cx pending; also with candida in urine which may be colonization, on fluconazole for now per Nephro  -consult urology for catheter change and eval, performed on 2/4  -completed treatment           Chronic diastolic heart failure  Denies SOB, does have chronic BLE  No respiratory distress noted on room air  -chest XRAY with bilateral atelectasis; incentive spirometry  -hold torsemide 2/2 renal failure      Bradycardia  Chronic  EKG not in chart  HR 40s-50s, asymptomatic  Cardiac monitoring      Primary hypothyroidism  Cont synthroid      Frequent falls  Consult PT/OT for eval: recommend SNF      Neurogenic bladder  Has chronic indwelling  meadows  -cont ditropan  -discontinue zanaflex        Chronic pain syndrome  Has dilaudid intrathecal pump  -bowel regimen  -monitor closely       Major depressive disorder, recurrent, mild  Cont prozac      Sleep apnea  Non-compliant with CPAP at home  O2 sat drops while sleeping  -CPAP at night        VTE Risk Mitigation (From admission, onward)         Ordered     enoxaparin injection 40 mg  Daily         02/13/22 1148     IP VTE HIGH RISK PATIENT  Once         02/03/22 1734     Place sequential compression device  Until discontinued         02/03/22 1734                      I have assessed these finding virtually using telemed platform and with assistance of bedside nurse                 The attending portion of this evaluation, treatment, and documentation was performed per HEVER Mejias via Telemedicine AudioVisual using the secure [email or web address] software platform with 2 way audio/video. The provider was located off-site and the patient is located in the hospital. The aforementioned video software was utilized to document the relevant history and physical exam    HEVER Mejias  Department of Hospital Medicine   [facility]

## 2022-02-14 NOTE — ASSESSMENT & PLAN NOTE
Na 129, baseline 139  In setting of CARITO and poor PO intake  -given 2.5L LR in ED  -cont LR   -monitor labs  -hold torsemide  -nephrology consulted  -resolved

## 2022-02-14 NOTE — PROGRESS NOTES
[facility] Medicine  Telemedicine Progress Note    Patient Name: Tasha Hawley  MRN: [id number]  Patient Class: IP- Inpatient   Admission Date: 2/3/2022  Length of Stay: 10 days  Attending Physician: Mel Hebert MD  Primary Care Provider: Mesfin Hodges Ii, MD          Subjective:     Principal Problem:Acute renal failure with tubular necrosis        HPI:  Tasha Hawley is a 67 yo female with a pmh of neurogenic bladder, suprapubic catheter,recurrent UTI, chronic pain with epidural pain pump, CAD, hypothyroidism, sleep apnea. She c/o malaise, poor PO intake, N/V, chills, decreased urine output, weakness, chronic pain. She was seen by ID today and noted to have cloudy urine. She just finished a course of cipro and has recurrent UTIs. She states she has not been feeling well for a few weeks. She feels as though she is leaking urine from her urethra. She has home health with [place] and states they changed her catheter 2 days ago (no record on chart) and she is not sure if a urine sample was obtained at that time. She has chronic swelling to her lower legs and walks with a walker. She fell a few days ago with no injury.       Overview/Hospital Course:  No notes on file    Interval History:    Patient seen and examined today. No acute issues noted. Patient is feeling well and progressing during this hospitalization. VSS, afebrile. Labs reviewed. Cr normalizing   RN reported vesicular rash on R buttock , unable to visualize this a photo. Concern for shingles . Will need to further assess the rash when possible      aspirin  81 mg Oral Daily    atorvastatin  80 mg Oral Daily    enoxaparin  40 mg Subcutaneous Daily    FLUoxetine  60 mg Oral Daily    levothyroxine  125 mcg Oral Before breakfast    oxybutynin  15 mg Oral Daily    pantoprazole  40 mg Oral Daily    polyethylene glycol  17 g Oral Daily    QUEtiapine  50 mg Oral BID    senna  2 tablet Oral BID         Review of Systems    Constitutional: Positive for activity change and fatigue. Negative for fever.   HENT: Negative for congestion.    Eyes: Negative for visual disturbance.   Respiratory: Negative for cough, shortness of breath and wheezing.    Cardiovascular: Negative for chest pain, palpitations and leg swelling.   Gastrointestinal: Negative for abdominal distention, abdominal pain, constipation, diarrhea, nausea and vomiting.   Genitourinary: Negative for decreased urine volume and difficulty urinating.   Musculoskeletal: Negative for arthralgias and myalgias.   Neurological: Positive for weakness. Negative for dizziness and syncope.   Psychiatric/Behavioral: Negative for agitation and confusion.     Objective:     Vital Signs (Most Recent):  Temp: 97.5 °F (36.4 °C) (02/13/22 2028)  Pulse: (!) 57 (02/13/22 2028)  Resp: 18 (02/13/22 2028)  BP: (!) 114/57 (02/13/22 2028)  SpO2: 96 % (02/13/22 2028) Vital Signs (24h Range):  Temp:  [97.4 °F (36.3 °C)-98.4 °F (36.9 °C)] 97.5 °F (36.4 °C)  Pulse:  [47-59] 57  Resp:  [18] 18  SpO2:  [90 %-98 %] 96 %  BP: (107-116)/(51-59) 114/57     Weight: 98.5 kg (217 lb 2.5 oz)  Body mass index is 37.27 kg/m².    Intake/Output Summary (Last 24 hours) at 2/13/2022 2324  Last data filed at 2/13/2022 1800  Gross per 24 hour   Intake 360 ml   Output 1500 ml   Net -1140 ml      Physical Exam  Constitutional:       General: She is not in acute distress.     Appearance: Normal appearance. She is well-developed.   HENT:      Head: Normocephalic and atraumatic.   Eyes:      Pupils: Pupils are equal, round, and reactive to light.   Pulmonary:      Effort: Pulmonary effort is normal. No respiratory distress.   Abdominal:      General: Abdomen is flat. There is no distension.      Palpations: Abdomen is soft.   Musculoskeletal:         General: Normal range of motion.      Cervical back: Neck supple.   Skin:     General: Skin is warm and dry.   Neurological:      General: No focal deficit present.      Mental  Status: She is alert and oriented to person, place, and time.   Psychiatric:         Mood and Affect: Mood normal.         Behavior: Behavior normal.         Significant Labs:   All pertinent labs within the past 24 hours have been reviewed.  CBC: No results for input(s): WBC, HGB, HCT, PLT in the last 48 hours.  CMP:   Recent Labs   Lab 02/12/22  0308 02/13/22  0309    138   K 4.9 5.5*    102   CO2 31* 31*   GLU 76 84   BUN 14 12   CREATININE 1.8* 1.6*   CALCIUM 9.2 8.4*   ALBUMIN 3.1* 3.0*   ANIONGAP 7* 5*   EGFRNONAA 29* 34*       Significant Imaging: I have reviewed all pertinent imaging results/findings within the past 24 hours.         Assessment/Plan:      * Acute renal failure with tubular necrosis  BUN 22. Creatinine 7.2, baseline 1.2  Reports poor PO intake, N/V, decreased urine output  S/p 2.5L IV fluids in ED  -initially treated with IV fluids without improvement; will stop  -nephrology was consulted   -hold torsemide  -renally dose medications  - cr normalizing   -repeat BMP daily  -cont to improve. Good urine output.  -discharge planning to SNF    - cr normalizing     Abdominal pain  - hyoscyamine prn added  - PPI  - Maalox PRN  - bowel regimen  - xr abd    Anxiety  - hydroxyzine held due to lethargy  - Increase seroquel to 50mg BID  - Trazodone and seroquel PRN    Hyponatremia  Na 129, baseline 139  In setting of CARITO and poor PO intake  -given 2.5L LR in ED  -cont LR   -monitor labs  -hold torsemide  -nephrology consulted    Bilateral lower extremity edema  Chronic  Hold torsemide for renal failure      Urinary tract infection associated with cystostomy catheter  Recurrent UTI, completed course of cipro 2 days ago  Followed by ID and urology outpatient  Dark, cloudy urine noted. Patient c/o malaise, chills, N/V, weakness, decreased PO intake  Given a dose of rocephin in ED  -switched to cefepime given recent urine culture sensitivities on 1/21  -urine and blood cx pending; also with  candida in urine which may be colonization, on fluconazole for now per Nephro  -consult urology for catheter change and eval, performed on 2/4  -completed treatment           Chronic diastolic heart failure  Denies SOB, does have chronic BLE  No respiratory distress noted on room air  -chest XRAY with bilateral atelectasis; incentive spirometry  -hold torsemide 2/2 renal failure      Bradycardia  Chronic  EKG not in chart  HR 40s-50s, asymptomatic  Cardiac monitoring      Primary hypothyroidism  Cont synthroid      Frequent falls  Consult PT/OT for eval: recommend SNF      Neurogenic bladder  Has chronic indwelling meadows  -cont ditropan  -discontinue zanaflex        Chronic pain syndrome  Has dilaudid intrathecal pump  -bowel regimen  -monitor closely       Major depressive disorder, recurrent, mild  Cont prozac      Sleep apnea  Non-compliant with CPAP at home  O2 sat drops while sleeping  -CPAP at night        VTE Risk Mitigation (From admission, onward)         Ordered     enoxaparin injection 40 mg  Daily         02/13/22 1148     IP VTE HIGH RISK PATIENT  Once         02/03/22 1734     Place sequential compression device  Until discontinued         02/03/22 1734                Pending SNF    I have assessed these finding virtually using telemed platform and with assistance of bedside nurse                 The attending portion of this evaluation, treatment, and documentation was performed per Mel Hebert MD via Telemedicine AudioVisual using the secure [employer] software platform with 2 way audio/video. The provider was located off-site and the patient is located in the hospital. The aforementioned video software was utilized to document the relevant history and physical exam    Mel Hebert MD  Department of Hospital Medicine   [facility]

## 2022-02-14 NOTE — PLAN OF CARE
Additional SNF referral sent to facilities in the [place] area.       02/14/22 1519   Post-Acute Status   Post-Acute Authorization Placement   Post-Acute Placement Status Referrals Sent   Discharge Plan   Discharge Plan A Skilled Nursing Facility

## 2022-02-14 NOTE — PLAN OF CARE
Per Marsha with [facility], open claims are 8/8/2020-[facility], 8/8/2020-[facility], and 8/1/2000-[facility].     [place] informed patient's daughter Lisa of claims as pt's daughter reports that claims have been closed years ago. [place] informed pt's daughter that a letter will have to be faxed showing that claim has been closed with the date. Pt's daughter to discuss with patient.

## 2022-02-14 NOTE — PLAN OF CARE
Problem: Occupational Therapy Goal  Goal: Occupational Therapy Goal  Description: Goals to be met by: 3/4/22     Patient will increase functional independence with ADLs by performing:    LE Dressing with Minimal Assistance.  Grooming while standing with Supervision.  Toileting from toilet with Supervision for hygiene and clothing management.   Supine to sit with Supervision.  Step transfer with Supervision  Toilet transfer to toilet with Supervision.  Increased functional strength to WFL for self care skills and functional mobility.  Upper extremity exercise program x10 reps per handout, with independence.    Outcome: Ongoing, Progressing   Tasha Hawley is a 66 y.o. female with a medical diagnosis of Acute renal failure with tubular necrosis.  Performance deficits affecting function are weakness,impaired endurance,impaired self care skills,impaired functional mobilty,gait instability,impaired balance,decreased upper extremity function,decreased lower extremity function,decreased safety awareness,pain,impaired cardiopulmonary response to activity,decreased ROM,impaired skin,edema. Pt found in chair, agreeable to therapy.  Pt required SBA for transfers and was able to stand at sink with SBA to perform G/H task.  Pt with flexed trunk requiring vcs for upright posture w/i her range. Pt pleasant and cooperative throughout. Continue OT services to address functional goals, progressing as able.

## 2022-02-14 NOTE — SUBJECTIVE & OBJECTIVE
Interval History: no acute events overnight.  Awaiting SNF placement.  Creat 1.5 today.    Review of Systems   Constitutional: Positive for activity change and fatigue. Negative for fever.   HENT: Negative for congestion.    Eyes: Negative for visual disturbance.   Respiratory: Negative for cough, shortness of breath and wheezing.    Cardiovascular: Negative for chest pain, palpitations and leg swelling.   Gastrointestinal: Negative for abdominal distention, abdominal pain, constipation, diarrhea, nausea and vomiting.   Genitourinary: Negative for decreased urine volume and difficulty urinating.   Musculoskeletal: Negative for arthralgias and myalgias.   Skin: Positive for rash.   Neurological: Positive for weakness. Negative for dizziness and syncope.   Psychiatric/Behavioral: Negative for agitation and confusion.     Objective:     Vital Signs (Most Recent):  Temp: 98.5 °F (36.9 °C) (02/14/22 1123)  Pulse: 62 (02/14/22 1200)  Resp: 18 (02/14/22 1123)  BP: (!) 115/57 (02/14/22 1123)  SpO2: (!) 92 % (02/14/22 1200) Vital Signs (24h Range):  Temp:  [97.4 °F (36.3 °C)-98.5 °F (36.9 °C)] 98.5 °F (36.9 °C)  Pulse:  [49-69] 62  Resp:  [18] 18  SpO2:  [83 %-98 %] 92 %  BP: (114-132)/(51-66) 115/57     Weight: 98.5 kg (217 lb 2.5 oz)  Body mass index is 37.27 kg/m².    Intake/Output Summary (Last 24 hours) at 2/14/2022 1308  Last data filed at 2/14/2022 1200  Gross per 24 hour   Intake 600 ml   Output 2400 ml   Net -1800 ml      Physical Exam  Constitutional:       General: She is not in acute distress.     Appearance: Normal appearance. She is well-developed.   HENT:      Head: Normocephalic and atraumatic.   Eyes:      Pupils: Pupils are equal, round, and reactive to light.   Pulmonary:      Effort: Pulmonary effort is normal. No respiratory distress.   Abdominal:      General: Abdomen is flat. There is no distension.      Palpations: Abdomen is soft.   Musculoskeletal:         General: Normal range of motion.       Cervical back: Neck supple.   Skin:     General: Skin is warm and dry.   Neurological:      General: No focal deficit present.      Mental Status: She is alert and oriented to person, place, and time.   Psychiatric:         Mood and Affect: Mood normal.         Behavior: Behavior normal.         Significant Labs:   All pertinent labs within the past 24 hours have been reviewed.  BMP:   Recent Labs   Lab 02/14/22  0630   GLU 79      K 4.5      CO2 28   BUN 12   CREATININE 1.5*   CALCIUM 8.9     CBC: No results for input(s): WBC, HGB, HCT, PLT in the last 48 hours.    Significant Imaging: I have reviewed all pertinent imaging results/findings within the past 24 hours.

## 2022-02-14 NOTE — PT/OT/SLP PROGRESS
Occupational Therapy   Treatment    Name: Tasha Hawley  MRN: [id number]  Admitting Diagnosis:  Acute renal failure with tubular necrosis       Recommendations:     Discharge Recommendations: nursing facility, skilled  Discharge Equipment Recommendations:  other (see comments) (TBD)  Barriers to discharge:  Other (Comment) (Increased level of assistance)    Assessment:     Tasha Hawley is a 66 y.o. female with a medical diagnosis of Acute renal failure with tubular necrosis.  Performance deficits affecting function are weakness,impaired endurance,impaired self care skills,impaired functional mobilty,gait instability,impaired balance,decreased upper extremity function,decreased lower extremity function,decreased safety awareness,pain,impaired cardiopulmonary response to activity,decreased ROM,impaired skin,edema. Pt found in chair, agreeable to therapy.  Pt required SBA for transfers and was able to stand at sink with SBA to perform G/H task.  Pt with flexed trunk requiring vcs for upright posture w/i her range. Pt pleasant and cooperative throughout. Continue OT services to address functional goals, progressing as able.      Rehab Prognosis:  Good; patient would benefit from acute skilled OT services to address these deficits and reach maximum level of function.       Plan:     Patient to be seen 3 x/week to address the above listed problems via self-care/home management,therapeutic activities,therapeutic exercises  · Plan of Care Expires: 03/04/22  · Plan of Care Reviewed with: patient    Subjective     Pain/Comfort:  · Pain Rating 1:  (low back, BLEs and abdomen-did not rate)  · Pain Addressed 2: Reposition,Distraction    Objective:     Communicated with: RN prior to session.  Patient found up in chair with telemetry,meadows catheter,bed alarm,peripheral IV upon OT entry to room.    General Precautions: Standard, fall,hearing impaired   Orthopedic Precautions:N/A   Braces: N/A  Respiratory Status: Room air      Occupational Performance:     Functional Mobility/Transfers:  · Patient completed Sit <> Stand Transfer with stand by assistance  with  rolling walker and vcs for hand placement   Functional Mobility: Pt ambulated with SBA/CGA using RW in preparation for ambulatory level ADL/IADL.  Pt requires vcs for RW proximity, safety/mgmt and upright posture.  Pt required standing rest breaks secondary to LE weakness.     Activities of Daily Living:  · Grooming: stand by assistance and contact guard assistance standing at sink, leaning on forearms at times.   · Lower Body Dressing: stand by assistance to adjust socks sitting and CGA to adjust brief in standing.       [facility] 6 Click ADL: 16    Patient left up in chair with all lines intact, call button in reach and chair alarm onEducation:      GOALS:   Multidisciplinary Problems     Occupational Therapy Goals        Problem: Occupational Therapy Goal    Goal Priority Disciplines Outcome Interventions   Occupational Therapy Goal     OT, PT/OT Ongoing, Progressing    Description: Goals to be met by: 3/4/22     Patient will increase functional independence with ADLs by performing:    LE Dressing with Minimal Assistance.  Grooming while standing with Supervision.  Toileting from toilet with Supervision for hygiene and clothing management.   Supine to sit with Supervision.  Step transfer with Supervision  Toilet transfer to toilet with Supervision.  Increased functional strength to WFL for self care skills and functional mobility.  Upper extremity exercise program x10 reps per handout, with independence.                     Time Tracking:     OT Date of Treatment: 02/14/22  OT Start Time: 1417  OT Stop Time: 1446  OT Total Time (min): 29 min    Billable Minutes:Self Care/Home Management 12  Therapeutic Activity 17            2/14/2022

## 2022-02-14 NOTE — PT/OT/SLP PROGRESS
Physical Therapy Treatment    Patient Name:  Tasha Hawley   MRN:  [id number]    Recommendations:     Discharge Recommendations:  nursing facility, skilled   Discharge Equipment Recommendations:  (TBD)   Barriers to discharge:  decreased endurance    Assessment:     Tasha Hawley is a 66 y.o. female admitted with a medical diagnosis of Acute renal failure with tubular necrosis.  She presents with the following impairments/functional limitations:  weakness,impaired endurance,gait instability,impaired functional mobilty,impaired balance,decreased lower extremity function,impaired cardiopulmonary response to activity,impaired coordination,pain. Pt progressing well towards goals. Pt performed all bed mobility and transfers w/ CGA. Pt ambulated ~45 ft using RW w/ CGA and one standing rest break. Recommending SNF placement upon d/c.     Rehab Prognosis: Good; patient would benefit from acute skilled PT services to address these deficits and reach maximum level of function.    Recent Surgery: * No surgery found *      Plan:     During this hospitalization, patient to be seen 3 x/week to address the identified rehab impairments via gait training,therapeutic activities,therapeutic exercises,neuromuscular re-education and progress toward the following goals:    Plan of Care Expires:  03/04/22    Subjective     Chief Complaint: none  Patient/Family Comments/goals: Pt agreeable to participate in today's therapy session.   Pain/Comfort:  Pain Rating 1:  (LBP, not rated)  Pain Addressed 1: Reposition,Distraction      Objective:     Patient found HOB elevated with bed alarm,meadows catheter,telemetry upon PT entry to room.     General Precautions: Standard, fall,hearing impaired   Orthopedic Precautions:N/A   Braces: N/A  Respiratory Status: Room air     Functional Mobility:  Bed Mobility:     Supine to Sit: stand by assistance  Transfers:     Sit to Stand:  contact guard assistance with rolling walker  Gait: Pt ambulated ~45  ft using RW w/ CGA to SBA. Pt required one standing rest break 2/2 decreased endurance. Pt demonstrated forward flexed posture over RW. Educated pt on standing upright and maintaining closer proximity to RW.       AM-PAC 6 CLICK MOBILITY  Turning over in bed (including adjusting bedclothes, sheets and blankets)?: 3  Sitting down on and standing up from a chair with arms (e.g., wheelchair, bedside commode, etc.): 3  Moving from lying on back to sitting on the side of the bed?: 3  Moving to and from a bed to a chair (including a wheelchair)?: 3  Need to walk in hospital room?: 3  Climbing 3-5 steps with a railing?: 3  Basic Mobility Total Score: 18       Therapeutic Activities and Exercises:  Educated pt on plan of care, pt agreeable to participate in today's therapy session.   Transitioned to EOB and performed the following BLE ex's x 10 reps: APs, LAQs, hip flexion marches.   Pt ambulated as noted above.   Pt w/ decreased endurance w/ reports of fatigue during ambulation.  Pt left up in chair w/ all needs in reach.   Educated pt on calling nurse when wanting to return to bed.     Patient left up in chair with all lines intact, call button in reach, chair alarm on and nurse notified..    GOALS:   Multidisciplinary Problems       Physical Therapy Goals          Problem: Physical Therapy Goal    Goal Priority Disciplines Outcome Goal Variances Interventions   Physical Therapy Goal     PT, PT/OT Ongoing, Progressing     Description: Goals to be met by: 3/4/22     Patient will increase functional independence with mobility by performin. Supine <> sit with supervision  2. Sit to stand transfer with supervision  3. Bed to chair transfer with Supervision using Rolling Walker  4. Gait  x 50 feet with Supervision using Rolling Walker.   5. Lower extremity exercise program x 10 reps per handout, with supervision                         Time Tracking:     PT Received On: 22  PT Start Time: 1135     PT Stop Time:  1158  PT Total Time (min): 23 min     Billable Minutes: Gait Training 13 and Therapeutic Activity 10    Treatment Type: Treatment  PT/PTA: PT     PTA Visit Number: 0     02/14/2022

## 2022-02-15 LAB
ALBUMIN SERPL BCP-MCNC: 3 G/DL (ref 3.5–5.2)
ANION GAP SERPL CALC-SCNC: 5 MMOL/L (ref 8–16)
BUN SERPL-MCNC: 12 MG/DL (ref 8–23)
CALCIUM SERPL-MCNC: 8.5 MG/DL (ref 8.7–10.5)
CHLORIDE SERPL-SCNC: 104 MMOL/L (ref 95–110)
CO2 SERPL-SCNC: 31 MMOL/L (ref 23–29)
CREAT SERPL-MCNC: 1.2 MG/DL (ref 0.5–1.4)
EST. GFR  (AFRICAN AMERICAN): 55 ML/MIN/1.73 M^2
EST. GFR  (NON AFRICAN AMERICAN): 48 ML/MIN/1.73 M^2
GLUCOSE SERPL-MCNC: 83 MG/DL (ref 70–110)
PHOSPHATE SERPL-MCNC: 3.6 MG/DL (ref 2.7–4.5)
POTASSIUM SERPL-SCNC: 4.4 MMOL/L (ref 3.5–5.1)
SODIUM SERPL-SCNC: 140 MMOL/L (ref 136–145)

## 2022-02-15 PROCEDURE — 25000003 PHARM REV CODE 250: Mod: HCNC | Performed by: NURSE PRACTITIONER

## 2022-02-15 PROCEDURE — 94660 CPAP INITIATION&MGMT: CPT | Mod: HCNC

## 2022-02-15 PROCEDURE — 94761 N-INVAS EAR/PLS OXIMETRY MLT: CPT | Mod: HCNC

## 2022-02-15 PROCEDURE — 80069 RENAL FUNCTION PANEL: CPT | Mod: HCNC | Performed by: STUDENT IN AN ORGANIZED HEALTH CARE EDUCATION/TRAINING PROGRAM

## 2022-02-15 PROCEDURE — 99900035 HC TECH TIME PER 15 MIN (STAT): Mod: HCNC

## 2022-02-15 PROCEDURE — 63600175 PHARM REV CODE 636 W HCPCS: Mod: HCNC | Performed by: NURSE PRACTITIONER

## 2022-02-15 PROCEDURE — 97110 THERAPEUTIC EXERCISES: CPT | Mod: HCNC,CQ

## 2022-02-15 PROCEDURE — 97116 GAIT TRAINING THERAPY: CPT | Mod: HCNC,CQ

## 2022-02-15 PROCEDURE — 94799 UNLISTED PULMONARY SVC/PX: CPT | Mod: HCNC

## 2022-02-15 PROCEDURE — 63600175 PHARM REV CODE 636 W HCPCS: Mod: HCNC | Performed by: HOSPITALIST

## 2022-02-15 PROCEDURE — 36415 COLL VENOUS BLD VENIPUNCTURE: CPT | Mod: HCNC | Performed by: STUDENT IN AN ORGANIZED HEALTH CARE EDUCATION/TRAINING PROGRAM

## 2022-02-15 PROCEDURE — 25000003 PHARM REV CODE 250: Mod: HCNC | Performed by: INTERNAL MEDICINE

## 2022-02-15 PROCEDURE — 21400001 HC TELEMETRY ROOM: Mod: HCNC

## 2022-02-15 RX ORDER — TRAMADOL HYDROCHLORIDE 50 MG/1
50 TABLET ORAL 2 TIMES DAILY PRN
Status: DISCONTINUED | OUTPATIENT
Start: 2022-02-15 | End: 2022-02-17 | Stop reason: HOSPADM

## 2022-02-15 RX ORDER — TRAZODONE HYDROCHLORIDE 50 MG/1
50 TABLET ORAL NIGHTLY
Status: DISCONTINUED | OUTPATIENT
Start: 2022-02-15 | End: 2022-02-17 | Stop reason: HOSPADM

## 2022-02-15 RX ADMIN — ENOXAPARIN SODIUM 40 MG: 100 INJECTION SUBCUTANEOUS at 04:02

## 2022-02-15 RX ADMIN — VALACYCLOVIR HYDROCHLORIDE 1000 MG: 500 TABLET, FILM COATED ORAL at 08:02

## 2022-02-15 RX ADMIN — POLYETHYLENE GLYCOL 3350 17 G: 17 POWDER, FOR SOLUTION ORAL at 08:02

## 2022-02-15 RX ADMIN — ATORVASTATIN CALCIUM 80 MG: 40 TABLET, FILM COATED ORAL at 09:02

## 2022-02-15 RX ADMIN — LEVOTHYROXINE SODIUM 125 MCG: 0.03 TABLET ORAL at 05:02

## 2022-02-15 RX ADMIN — TRAZODONE HYDROCHLORIDE 50 MG: 50 TABLET ORAL at 08:02

## 2022-02-15 RX ADMIN — QUETIAPINE FUMARATE 50 MG: 25 TABLET ORAL at 08:02

## 2022-02-15 RX ADMIN — ONDANSETRON 4 MG: 2 INJECTION INTRAMUSCULAR; INTRAVENOUS at 01:02

## 2022-02-15 RX ADMIN — STANDARDIZED SENNA CONCENTRATE 2 TABLET: 8.6 TABLET ORAL at 08:02

## 2022-02-15 RX ADMIN — PANTOPRAZOLE SODIUM 40 MG: 40 TABLET, DELAYED RELEASE ORAL at 08:02

## 2022-02-15 RX ADMIN — FLUOXETINE HYDROCHLORIDE 60 MG: 20 CAPSULE ORAL at 08:02

## 2022-02-15 RX ADMIN — HYDROCODONE BITARTRATE AND ACETAMINOPHEN 1 TABLET: 5; 325 TABLET ORAL at 11:02

## 2022-02-15 RX ADMIN — OXYBUTYNIN CHLORIDE 15 MG: 5 TABLET, EXTENDED RELEASE ORAL at 08:02

## 2022-02-15 RX ADMIN — ASPIRIN 81 MG: 81 TABLET, COATED ORAL at 08:02

## 2022-02-15 NOTE — ASSESSMENT & PLAN NOTE
Consult PT/OT for eval: recommend SNF  Maintain fall precautions; reinforced to patient to call for assistance.

## 2022-02-15 NOTE — PLAN OF CARE
Per Sammi with [facility], No beds available at this time.       02/15/22 1038   Post-Acute Status   Post-Acute Authorization Placement   Discharge Plan   Discharge Plan A Skilled Nursing Facility

## 2022-02-15 NOTE — ASSESSMENT & PLAN NOTE
Rash had Na 129, baseline 139  In setting of CARITO and poor PO intake  -given 2.5L LR in ED  -cont LR   -monitor labs  -hold torsemide  -nephrology consulted  -resolved

## 2022-02-15 NOTE — PLAN OF CARE
Pt received all medications per MaR. Pt AAOX4. Pt vitals are stable and safety maintained. Will continue to monitor.

## 2022-02-15 NOTE — ASSESSMENT & PLAN NOTE
Denies SOB, does have chronic BLE  No respiratory distress noted on room air  -chest XRAY with bilateral atelectasis; incentive spirometry  -hold torsemide 2/2 renal failure-no s/s of heart failure.  Continue to hold torsemide and monitor closely.

## 2022-02-15 NOTE — ASSESSMENT & PLAN NOTE
Patient has chronic hypothyroidism. TFTs reviewed-   Lab Results   Component Value Date    TSH 0.960 07/08/2021   . Will continue chronic levothyroxine and adjust for and clinical changes.

## 2022-02-15 NOTE — PLAN OF CARE
TN spoke with pt's daughter via phone regarding SNF placement progress; TN informed pt's daughter no bed availbility at [facility] and still awaiting response from [facility]. Pt's daughter states that pt is anxious for discharge and may just d/c with home health services instead of going to SNF. Lisa requesting to speak with attending team. TN informed SHONDA Dueñas via secure chat.

## 2022-02-15 NOTE — RESPIRATORY THERAPY
Pt has been refusing CPAP ordered QHS. Pt states she does not need CPAP and she will contact RT if CPAP is needed. No respiratory distress noted.

## 2022-02-15 NOTE — ASSESSMENT & PLAN NOTE
Has dilaudid intrathecal pump.  Medicated for breakthrough pain today with norco following which some mild hypotension was noted.  Pain medication changed from prn norco to tramadol bid prn.

## 2022-02-15 NOTE — PLAN OF CARE
TN left messages for Admissions staff at [facility], awaiting return phone call.       02/15/22 9519   Post-Acute Status   Post-Acute Authorization Placement   Discharge Plan   Discharge Plan A Skilled Nursing Facility

## 2022-02-15 NOTE — PT/OT/SLP PROGRESS
Physical Therapy Treatment    Patient Name:  Tasha Hawley   MRN:  [id number]    Recommendations:     Discharge Recommendations:  nursing facility, skilled   Discharge Equipment Recommendations:  (TBD)   Barriers to discharge: decreased mobility,strength and endurance    Assessment:     Tasha Hawley is a 66 y.o. female admitted with a medical diagnosis of Acute renal failure with tubular necrosis.  She presents with the following impairments/functional limitations:  weakness,impaired endurance,impaired functional mobilty,gait instability,impaired balance,decreased upper extremity function,decreased lower extremity function,pain,decreased ROM,impaired coordination,pt with good participation and improving status,pt will benefit from SNF to address above functional limitations.    Rehab Prognosis: Fair; patient would benefit from acute skilled PT services to address these deficits and reach maximum level of function.    Recent Surgery: * No surgery found *      Plan:     During this hospitalization, patient to be seen 3 x/week to address the identified rehab impairments via gait training,therapeutic activities,therapeutic exercises,neuromuscular re-education and progress toward the following goals:    · Plan of Care Expires:  03/04/22    Subjective     Chief Complaint: n/a  Patient/Family Comments/goals: pt agreeable to up in chair.  Pain/Comfort:  · Pain Rating 1: 7/10  · Location - Orientation 1: lower  · Location 1: back  · Pain Addressed 1: Reposition,Distraction  · Pain Rating Post-Intervention 1: 8/10      Objective:     Communicated with nsg prior to session.  Patient found up in chair with meadows catheter,peripheral IV,telemetry upon PT entry to room.     General Precautions: Standard, fall,hearing impaired   Orthopedic Precautions:N/A   Braces: N/A  Respiratory Status: Room air     Functional Mobility:  · Transfers:     · Sit to Stand:  stand by assistance with rolling walker  · Gait: amb ~40' X 2 with  RW and SBA X 1 seated rest between trials,pt became a little disoriented upon firt walk,nsg notified  · Balance: fair standing balance with RW      AM-PAC 6 CLICK MOBILITY  Turning over in bed (including adjusting bedclothes, sheets and blankets)?: 3  Sitting down on and standing up from a chair with arms (e.g., wheelchair, bedside commode, etc.): 3  Moving from lying on back to sitting on the side of the bed?: 3  Moving to and from a bed to a chair (including a wheelchair)?: 3  Need to walk in hospital room?: 3  Climbing 3-5 steps with a railing?: 3  Basic Mobility Total Score: 18       Therapeutic Activities and Exercises: le seated ex's X 10-12 reps inc: ap,laq,hip flex.       Patient left up in chair with all lines intact, call button in reach and nsg present..    GOALS: see general POC  Multidisciplinary Problems     Physical Therapy Goals        Problem: Physical Therapy Goal    Goal Priority Disciplines Outcome Goal Variances Interventions   Physical Therapy Goal     PT, PT/OT Ongoing, Progressing     Description: Goals to be met by: 3/4/22     Patient will increase functional independence with mobility by performin. Supine <> sit with supervision  2. Sit to stand transfer with supervision  3. Bed to chair transfer with Supervision using Rolling Walker  4. Gait  x 50 feet with Supervision using Rolling Walker.   5. Lower extremity exercise program x 10 reps per handout, with supervision                     Time Tracking:     PT Received On: 02/15/22  PT Start Time: 0824     PT Stop Time: 0850  PT Total Time (min): 26 min     Billable Minutes: Gait Training 16 and Therapeutic Exercise 10    Treatment Type: Treatment  PT/PTA: PTA     PTA Visit Number: 1     02/15/2022

## 2022-02-15 NOTE — PLAN OF CARE
Patient on room air with documented SpO2 and in no apparent distress. Will continue to monitor. Patient refused CPAP, stated she will call to put on if she feels if she needs it.

## 2022-02-15 NOTE — SUBJECTIVE & OBJECTIVE
Interval History: no acute events overnight. Pt states she is tired and did not sleep well. Reports that she previously took trazodone 300mg at home nightly.  Will change her trazodone from prn to nightly, but advised that she will be on a lower dose at this time.   Awaiting SNF placement.  Spoke with pt's daughter via telephone.  She would like to take the patient home with home health if no acceptance at a SNF facility today.  She is willing and able to provide assistance.  I think this is a reasonable plan.  Renal function continues to improve.  Creat 1.2 today.  D/w patient and her daughter.      Review of Systems   Constitutional: Positive for activity change and fatigue. Negative for fever.   HENT: Negative for congestion, sore throat and trouble swallowing.    Eyes: Negative for visual disturbance.   Respiratory: Negative for cough, shortness of breath and wheezing.    Cardiovascular: Negative for chest pain, palpitations and leg swelling.   Gastrointestinal: Negative for abdominal distention, abdominal pain, constipation, diarrhea, nausea and vomiting.   Genitourinary: Negative for decreased urine volume and difficulty urinating.   Musculoskeletal: Positive for arthralgias. Negative for myalgias.   Skin: Positive for rash.   Neurological: Positive for weakness. Negative for dizziness and syncope.   Psychiatric/Behavioral: Negative for agitation and confusion.     Objective:     Vital Signs (Most Recent):  Temp: (!) 94.5 °F (34.7 °C) (02/15/22 1324)  Pulse: (!) 58 (02/15/22 1324)  Resp: 18 (02/15/22 1324)  BP: (!) 95/52 (02/15/22 1324)  SpO2: (!) 94 % (02/15/22 1324) Vital Signs (24h Range):  Temp:  [94.5 °F (34.7 °C)-98.3 °F (36.8 °C)] 94.5 °F (34.7 °C)  Pulse:  [50-60] 58  Resp:  [16-20] 18  SpO2:  [90 %-95 %] 94 %  BP: ()/(52-63) 95/52     Weight: 101.2 kg (223 lb 1.7 oz)  Body mass index is 38.3 kg/m².    Intake/Output Summary (Last 24 hours) at 2/15/2022 1349  Last data filed at 2/15/2022  0800  Gross per 24 hour   Intake 480 ml   Output 1750 ml   Net -1270 ml      Physical Exam  Constitutional:       General: She is not in acute distress.     Appearance: Normal appearance. She is well-developed.   HENT:      Head: Normocephalic and atraumatic.   Eyes:      Pupils: Pupils are equal, round, and reactive to light.   Cardiovascular:      Rate and Rhythm: Normal rate.   Pulmonary:      Effort: Pulmonary effort is normal. No respiratory distress.   Genitourinary:     Comments: deferred  Musculoskeletal:         General: Normal range of motion.      Cervical back: Neck supple.   Skin:     Findings: No erythema or rash.   Neurological:      General: No focal deficit present.      Mental Status: She is alert and oriented to person, place, and time.   Psychiatric:         Attention and Perception: Attention normal.         Mood and Affect: Affect is flat.         Behavior: Behavior normal.         Significant Labs:   All pertinent labs within the past 24 hours have been reviewed.  BMP:   Recent Labs   Lab 02/15/22  0545   GLU 83      K 4.4      CO2 31*   BUN 12   CREATININE 1.2   CALCIUM 8.5*       Significant Imaging: I have reviewed all pertinent imaging results/findings within the past 24 hours.

## 2022-02-15 NOTE — ASSESSMENT & PLAN NOTE
- hydroxyzine held due to lethargy  - Increase seroquel to 50mg BID  - resume nightly Trazodone   - scheduled seroquel

## 2022-02-15 NOTE — ASSESSMENT & PLAN NOTE
- hyoscyamine prn added  - PPI  - Maalox PRN  - bowel regimen  - xr abd-shows constipation.  PO dulcolax given yesterday with one BM noted.  Continue current regimen and monitor closely.

## 2022-02-15 NOTE — ASSESSMENT & PLAN NOTE
BUN 22. Creatinine 7.2, baseline 1.2  Reports poor PO intake, N/V, decreased urine output  S/p 2.5L IV fluids in ED  -initially treated with IV fluids without improvement; will stop  -nephrology was consulted   -hold torsemide  -renally dose medications  - cr normalizing   -repeat BMP daily  -cont to improve. Good urine output.  -discharge planning to SNF    - cr WNL-appears to have resolved-

## 2022-02-15 NOTE — PROGRESS NOTES
[facility] Medicine  Telemedicine Progress Note    Patient Name: Tasha Hawley  MRN: [id number]  Patient Class: IP- Inpatient   Admission Date: 2/3/2022  Length of Stay: 12 days  Attending Physician: Cristiane Villatoro MD  Primary Care Provider: Mesfin Hodges Ii, MD          Subjective:     Principal Problem:Acute renal failure with tubular necrosis        HPI:  Tasha Hawley is a 67 yo female with a pmh of neurogenic bladder, suprapubic catheter,recurrent UTI, chronic pain with epidural pain pump, CAD, hypothyroidism, sleep apnea. She c/o malaise, poor PO intake, N/V, chills, decreased urine output, weakness, chronic pain. She was seen by ID today and noted to have cloudy urine. She just finished a course of cipro and has recurrent UTIs. She states she has not been feeling well for a few weeks. She feels as though she is leaking urine from her urethra. She has home health with [facility] and states they changed her catheter 2 days ago (no record on chart) and she is not sure if a urine sample was obtained at that time. She has chronic swelling to her lower legs and walks with a walker. She fell a few days ago with no injury.       Overview/Hospital Course:  No notes on file    Interval History: no acute events overnight. Pt states she is tired and did not sleep well. Reports that she previously took trazodone 300mg at home nightly.  Will change her trazodone from prn to nightly, but advised that she will be on a lower dose at this time.   Awaiting SNF placement.  Spoke with pt's daughter via telephone.  She would like to take the patient home with home health if no acceptance at a SNF facility today.  She is willing and able to provide assistance.  I think this is a reasonable plan.  Renal function continues to improve.  Creat 1.2 today.  D/w patient and her daughter.      Review of Systems   Constitutional: Positive for activity change and fatigue. Negative for fever.   HENT: Negative for  congestion, sore throat and trouble swallowing.    Eyes: Negative for visual disturbance.   Respiratory: Negative for cough, shortness of breath and wheezing.    Cardiovascular: Negative for chest pain, palpitations and leg swelling.   Gastrointestinal: Negative for abdominal distention, abdominal pain, constipation, diarrhea, nausea and vomiting.   Genitourinary: Negative for decreased urine volume and difficulty urinating.   Musculoskeletal: Positive for arthralgias. Negative for myalgias.   Skin: Positive for rash.   Neurological: Positive for weakness. Negative for dizziness and syncope.   Psychiatric/Behavioral: Negative for agitation and confusion.     Objective:     Vital Signs (Most Recent):  Temp: (!) 94.5 °F (34.7 °C) (02/15/22 1324)  Pulse: (!) 58 (02/15/22 1324)  Resp: 18 (02/15/22 1324)  BP: (!) 95/52 (02/15/22 1324)  SpO2: (!) 94 % (02/15/22 1324) Vital Signs (24h Range):  Temp:  [94.5 °F (34.7 °C)-98.3 °F (36.8 °C)] 94.5 °F (34.7 °C)  Pulse:  [50-60] 58  Resp:  [16-20] 18  SpO2:  [90 %-95 %] 94 %  BP: ()/(52-63) 95/52     Weight: 101.2 kg (223 lb 1.7 oz)  Body mass index is 38.3 kg/m².    Intake/Output Summary (Last 24 hours) at 2/15/2022 1349  Last data filed at 2/15/2022 0800  Gross per 24 hour   Intake 480 ml   Output 1750 ml   Net -1270 ml      Physical Exam  Constitutional:       General: She is not in acute distress.     Appearance: Normal appearance. She is well-developed.   HENT:      Head: Normocephalic and atraumatic.   Eyes:      Pupils: Pupils are equal, round, and reactive to light.   Cardiovascular:      Rate and Rhythm: Normal rate.   Pulmonary:      Effort: Pulmonary effort is normal. No respiratory distress.   Genitourinary:     Comments: deferred  Musculoskeletal:         General: Normal range of motion.      Cervical back: Neck supple.   Skin:     Findings: No erythema or rash.   Neurological:      General: No focal deficit present.      Mental Status: She is alert and  oriented to person, place, and time.   Psychiatric:         Attention and Perception: Attention normal.         Mood and Affect: Affect is flat.         Behavior: Behavior normal.         Significant Labs:   All pertinent labs within the past 24 hours have been reviewed.  BMP:   Recent Labs   Lab 02/15/22  0545   GLU 83      K 4.4      CO2 31*   BUN 12   CREATININE 1.2   CALCIUM 8.5*       Significant Imaging: I have reviewed all pertinent imaging results/findings within the past 24 hours.      Assessment/Plan:      * Acute renal failure with tubular necrosis  BUN 22. Creatinine 7.2, baseline 1.2  Reports poor PO intake, N/V, decreased urine output  S/p 2.5L IV fluids in ED  -initially treated with IV fluids without improvement; will stop  -nephrology was consulted   -hold torsemide  -renally dose medications  - cr normalizing   -repeat BMP daily  -cont to improve. Good urine output.  -discharge planning to SNF    - cr WNL-appears to have resolved-    Abdominal pain  - hyoscyamine prn added  - PPI  - Maalox PRN  - bowel regimen  - xr abd-shows constipation.  PO dulcolax given yesterday with one BM noted.  Continue current regimen and monitor closely.    Anxiety   - hydroxyzine held due to lethargy  - Increase seroquel to 50mg BID  - resume nightly Trazodone   - scheduled seroquel     Hyponatremia  Rash had Na 129, baseline 139  In setting of CARITO and poor PO intake  -given 2.5L LR in ED  -cont LR   -monitor labs  -hold torsemide  -nephrology consulted  -resolved    Bilateral lower extremity edema  Chronic  Hold torsemide for renal failure; elevate BLE when up in chair      Urinary tract infection associated with cystostomy catheter  Recurrent UTI, completed course of cipro 2 days ago  Followed by ID and urology outpatient  Dark, cloudy urine noted. Patient c/o malaise, chills, N/V, weakness, decreased PO intake  Given a dose of rocephin in ED  -switched to cefepime given recent urine culture sensitivities  on 1/21  -urine and blood cx pending; also with candida in urine which may be colonization, on fluconazole for now per Nephro  -consult urology for catheter change and eval, performed on 2/4  -completed treatment           Chronic diastolic heart failure  Denies SOB, does have chronic BLE  No respiratory distress noted on room air  -chest XRAY with bilateral atelectasis; incentive spirometry  -hold torsemide 2/2 renal failure-no s/s of heart failure.  Continue to hold torsemide and monitor closely.      Bradycardia  Chronic  EKG 2/12 shows sinus bradycardia  HR 40s-50s, asymptomatic  Continue Cardiac monitoring      Primary hypothyroidism  Patient has chronic hypothyroidism. TFTs reviewed-   Lab Results   Component Value Date    TSH 0.960 07/08/2021   . Will continue chronic levothyroxine and adjust for and clinical changes.      Frequent falls  Consult PT/OT for eval: recommend SNF  Maintain fall precautions; reinforced to patient to call for assistance.      Neurogenic bladder  Has chronic indwelling meadows-care per nursing staff  -cont ditropan  -discontinue zanaflex        Chronic pain syndrome  Has dilaudid intrathecal pump.  Medicated for breakthrough pain today with norco following which some mild hypotension was noted.  Pain medication changed from prn norco to tramadol bid prn.        Major depressive disorder, recurrent, mild  Chronic; stable.  Continue chronic SSRI.    Sleep apnea  Non-compliant with CPAP at home  O2 sat drops while sleeping  -CPAP at night        VTE Risk Mitigation (From admission, onward)         Ordered     enoxaparin injection 40 mg  Daily         02/13/22 1148     IP VTE HIGH RISK PATIENT  Once         02/03/22 1734     Place sequential compression device  Until discontinued         02/03/22 1734                      I have assessed these finding virtually using telemed platform.      The attending portion of this evaluation, treatment, and documentation was performed per Sigrid ALLAN  HEVER Carter via Telemedicine AudioVisual using the secure [employer] software platform with 2 way audio/video. The provider was located off-site and the patient is located in the hospital. The aforementioned video software was utilized to document the relevant history and physical exam    HEVER Mejias  Department of [facility] Medicine   [facility]

## 2022-02-15 NOTE — PHYSICIAN QUERY
PT Name: Tasha Hawley  MR #: [id number]    DOCUMENTATION CLARIFICATION     CDS/: Myrtle Saez RN CDI            Contact information: [email or web address]  This form is a permanent document in the medical record.     Query Date: February 15, 2022    By submitting this query, we are merely seeking further clarification of documentation. Please utilize your independent clinical judgment when addressing the question(s) below.    The Medical Record contains the following:   Indicators   Supporting Clinical Findings Location in Medical Record   X AMS, Confusion,  LOC, etc.  Nurse is concern of patient's mental status, per nurse, patient is more confused. Patient had a confused episode yesterday but patient return back to baseline. Today, patient received Norco and hydroxyzine this morning 0845 and per nruse has been confused all day.   On my exam, patient is unable to tell me her name or date of birth. She is inattentive; attempting to clean her bedside table multiple times. She is able to follow simple commands. Her speech is delayed and she has difficulty completing full sentences.    Pt lethargic today. Frequently falls asleep during evaluation. Responds appropriately to questions. No neuro deficits.   Care Update 2/8  L Samuels NP                     ed 2/9 IRIS Martel MD   X Acute/Chronic Illness Acute encephalopathy  -may be due to Norco and hydroxyzine that was given this morning  -will give Narcan x1 and obtain Head CT stat  -avoid narcotics and anti-cholinergics for now  -Reassess in am  -Will continue to monitor    Acute renal failure with tubular necrosis  Hyponatremia  Anxiety  Chronic pain disorder  Major depressive disorder, recurrent, mild  ABD pain  [place] lower ext. Edema  Urinary tract infection associated with cystostomy catheter    Mental status back to baseline   Care Update 2/8  L Samuels NP               HMed 2/9 IRIS TRAVISed 2/11 IRIS Martel MD   X Radiology Findings CT Head 2/8  Within  limitations of motion compromised examination, no definite acute intracranial findings are identified.   Radiology   X Electrolyte Imbalance K 2/10 =  5.4   2/13 = 5.5  Cr 2/8 = 5.2   2/10 = 2.7  2/12 = 1.8   2/14 = 1.5   Labs   X Medication Hydrocodone po 2/3 -2/9      Nalaxone IV 2/3  2/8  2/9  Triazadone po nightly 2/3 - 10    Ceftriaxone IV 2/3  Cefepime IV 2/3-10  Valacyclovir po 2/14-21  quetipine po 2/3-12   Med sheets    Treatment              Other       The noted clinical guidelines are only system guidelines and do not replace the providers clinical judgment.    The National [place] of Neurologic Disorders and Stroke (NINDS) of the NIH describes encephalopathy as any diffuse disease of the brain that alters brain function or structure.    Provider, please further specify Acute Encephalopathy documentation associated with above clinical findings.        Brent All that apply;    [  x ] Toxic Encephalopathy - Due to drugs, chemicals, or other toxic substances     [   ] Metabolic Encephalopathy - Due to electrolyte imbalance, metabolic derangements, or infectious processes, includes Septic Encephalopathy, Uremic Encephalopathy     [   ] Encephalopathy, unspecified        [   ] Other Encephalopathy (please specify): ____________________     [   ] Other neurological condition- Includes Post-ictal altered mental status (please specify condition): __________     [   ]  Clinically Undetermined     Present on Admission (POA) Status:    [   ] Yes (Y)   [   ] No (N)   [   ] Clinically Undetermined (W)   [   ] Documentation insufficient to determine if condition is POA (U)     Please document in your progress notes daily for the duration of treatment until resolved, and include in your discharge summary.    References:  NED De La Paz RN, CCDS. (2018, June 9). Notes from the Instructor: Encephalopathy tips. Retrieved October 22, 2020, from [email or web address]/articles/[email or web address]-[email or web address]-encephalopathy-tips    ICD-9-CM  Coding Clinic First Quarter 2013, Effective with discharges: October 21, 2013 [facility] § Seizure with encephalopathy due to postictal state (2013).    ICD-10-CM/PCS [employer] Integrated Codebook (Version V.20.8.10.0) [Computer software]. (2020). Retrieved October 21, 2020.    National [place] of Neurological Disorders and Stroke. (2019, March 27). Retrieved October 22, 2020, from https://www.ninds.nih.gov/Disorders/All-Disorders/Rxfxuqopqdpetf-Uxaipfcgvjc-Zakk    Form No. 47601

## 2022-02-15 NOTE — ASSESSMENT & PLAN NOTE
Chronic  EKG 2/12 shows sinus bradycardia  HR 40s-50s, asymptomatic  Continue Cardiac monitoring

## 2022-02-16 ENCOUNTER — PATIENT OUTREACH (OUTPATIENT)
Dept: ADMINISTRATIVE | Facility: OTHER | Age: 67
End: 2022-02-16
Payer: MEDICARE

## 2022-02-16 LAB
ALBUMIN SERPL BCP-MCNC: 2.9 G/DL (ref 3.5–5.2)
ANION GAP SERPL CALC-SCNC: 10 MMOL/L (ref 8–16)
BUN SERPL-MCNC: 9 MG/DL (ref 8–23)
CALCIUM SERPL-MCNC: 8.6 MG/DL (ref 8.7–10.5)
CHLORIDE SERPL-SCNC: 107 MMOL/L (ref 95–110)
CO2 SERPL-SCNC: 22 MMOL/L (ref 23–29)
CREAT SERPL-MCNC: 1.2 MG/DL (ref 0.5–1.4)
EST. GFR  (AFRICAN AMERICAN): 55 ML/MIN/1.73 M^2
EST. GFR  (NON AFRICAN AMERICAN): 48 ML/MIN/1.73 M^2
GLUCOSE SERPL-MCNC: 79 MG/DL (ref 70–110)
PHOSPHATE SERPL-MCNC: 3.3 MG/DL (ref 2.7–4.5)
POTASSIUM SERPL-SCNC: 4.8 MMOL/L (ref 3.5–5.1)
SODIUM SERPL-SCNC: 139 MMOL/L (ref 136–145)

## 2022-02-16 PROCEDURE — 94660 CPAP INITIATION&MGMT: CPT | Mod: HCNC

## 2022-02-16 PROCEDURE — 21400001 HC TELEMETRY ROOM: Mod: HCNC

## 2022-02-16 PROCEDURE — 97535 SELF CARE MNGMENT TRAINING: CPT | Mod: HCNC

## 2022-02-16 PROCEDURE — 97110 THERAPEUTIC EXERCISES: CPT | Mod: HCNC,CQ

## 2022-02-16 PROCEDURE — 25000003 PHARM REV CODE 250: Mod: HCNC | Performed by: NURSE PRACTITIONER

## 2022-02-16 PROCEDURE — 94760 N-INVAS EAR/PLS OXIMETRY 1: CPT | Mod: HCNC

## 2022-02-16 PROCEDURE — 99900035 HC TECH TIME PER 15 MIN (STAT): Mod: HCNC

## 2022-02-16 PROCEDURE — 36415 COLL VENOUS BLD VENIPUNCTURE: CPT | Mod: HCNC | Performed by: STUDENT IN AN ORGANIZED HEALTH CARE EDUCATION/TRAINING PROGRAM

## 2022-02-16 PROCEDURE — 94761 N-INVAS EAR/PLS OXIMETRY MLT: CPT | Mod: HCNC

## 2022-02-16 PROCEDURE — 97530 THERAPEUTIC ACTIVITIES: CPT | Mod: HCNC,CQ

## 2022-02-16 PROCEDURE — 80069 RENAL FUNCTION PANEL: CPT | Mod: HCNC | Performed by: STUDENT IN AN ORGANIZED HEALTH CARE EDUCATION/TRAINING PROGRAM

## 2022-02-16 PROCEDURE — 63600175 PHARM REV CODE 636 W HCPCS: Mod: HCNC | Performed by: HOSPITALIST

## 2022-02-16 PROCEDURE — 25000003 PHARM REV CODE 250: Mod: HCNC | Performed by: INTERNAL MEDICINE

## 2022-02-16 RX ORDER — VALACYCLOVIR HYDROCHLORIDE 1 G/1
1000 TABLET, FILM COATED ORAL 2 TIMES DAILY
Qty: 8 TABLET | Refills: 0 | Status: SHIPPED | OUTPATIENT
Start: 2022-02-16 | End: 2022-04-04 | Stop reason: SDUPTHER

## 2022-02-16 RX ADMIN — QUETIAPINE FUMARATE 50 MG: 25 TABLET ORAL at 08:02

## 2022-02-16 RX ADMIN — VALACYCLOVIR HYDROCHLORIDE 1000 MG: 500 TABLET, FILM COATED ORAL at 09:02

## 2022-02-16 RX ADMIN — ENOXAPARIN SODIUM 40 MG: 100 INJECTION SUBCUTANEOUS at 04:02

## 2022-02-16 RX ADMIN — LEVOTHYROXINE SODIUM 125 MCG: 0.03 TABLET ORAL at 06:02

## 2022-02-16 RX ADMIN — FLUOXETINE HYDROCHLORIDE 60 MG: 20 CAPSULE ORAL at 08:02

## 2022-02-16 RX ADMIN — OXYBUTYNIN CHLORIDE 15 MG: 5 TABLET, EXTENDED RELEASE ORAL at 08:02

## 2022-02-16 RX ADMIN — TRAZODONE HYDROCHLORIDE 50 MG: 50 TABLET ORAL at 09:02

## 2022-02-16 RX ADMIN — STANDARDIZED SENNA CONCENTRATE 2 TABLET: 8.6 TABLET ORAL at 09:02

## 2022-02-16 RX ADMIN — ASPIRIN 81 MG: 81 TABLET, COATED ORAL at 08:02

## 2022-02-16 RX ADMIN — VALACYCLOVIR HYDROCHLORIDE 1000 MG: 500 TABLET, FILM COATED ORAL at 08:02

## 2022-02-16 RX ADMIN — QUETIAPINE FUMARATE 50 MG: 25 TABLET ORAL at 09:02

## 2022-02-16 RX ADMIN — STANDARDIZED SENNA CONCENTRATE 2 TABLET: 8.6 TABLET ORAL at 08:02

## 2022-02-16 RX ADMIN — ATORVASTATIN CALCIUM 80 MG: 40 TABLET, FILM COATED ORAL at 08:02

## 2022-02-16 RX ADMIN — PANTOPRAZOLE SODIUM 40 MG: 40 TABLET, DELAYED RELEASE ORAL at 08:02

## 2022-02-16 RX ADMIN — POLYETHYLENE GLYCOL 3350 17 G: 17 POWDER, FOR SOLUTION ORAL at 08:02

## 2022-02-16 NOTE — PT/OT/SLP PROGRESS
Physical Therapy Treatment    Patient Name:  Tasha Hawley   MRN:  [id number]    Recommendations:     Discharge Recommendations:  nursing facility, skilled   Discharge Equipment Recommendations: walker, rolling   Barriers to discharge: decreased mobility,strength and endurance    Assessment:     Tasha Hawley is a 66 y.o. female admitted with a medical diagnosis of Acute renal failure with tubular necrosis.  She presents with the following impairments/functional limitations:  weakness,impaired endurance,impaired functional mobilty,gait instability,impaired balance,decreased lower extremity function,decreased ROM,pain,impaired coordination,pt appears down secondary to facility near her home can't take her,pt requires assistance with all mobility and will benefit from SNF upon discharge.    Rehab Prognosis: Fair; patient would benefit from acute skilled PT services to address these deficits and reach maximum level of function.    Recent Surgery: * No surgery found *      Plan:     During this hospitalization, patient to be seen 3 x/week to address the identified rehab impairments via gait training,therapeutic activities,therapeutic exercises,neuromuscular re-education and progress toward the following goals:    · Plan of Care Expires:  03/04/22    Subjective     Chief Complaint: n/a  Patient/Family Comments/goals: pt wants to just go home.  Pain/Comfort:  · Pain Rating 1:  (no rating)  · Location - Orientation 1: lower  · Location 1: back (chronic)  · Pain Addressed 1: Distraction      Objective:     Communicated with nsg prior to session.  Patient found supine with meadows catheter,peripheral IV upon PT entry to room.     General Precautions: Standard, fall,hearing impaired   Orthopedic Precautions:N/A   Braces: N/A  Respiratory Status: Room air     Functional Mobility:  · Gait: n/a      AM-PAC 6 CLICK MOBILITY  Turning over in bed (including adjusting bedclothes, sheets and blankets)?: 3  Sitting down on and  standing up from a chair with arms (e.g., wheelchair, bedside commode, etc.): 3  Moving from lying on back to sitting on the side of the bed?: 3  Moving to and from a bed to a chair (including a wheelchair)?: 3  Need to walk in hospital room?: 3  Climbing 3-5 steps with a railing?: 3  Basic Mobility Total Score: 18       Therapeutic Activities and Exercises: le supine ex's x 10-12 reps inc: ap,qs,hs,abd/add,slr,spoke with patient about concerns and tried to make patient feel better.       Patient left supine with all lines intact, call button in reach and bed alarm on..    GOALS: see general POC  Multidisciplinary Problems     Physical Therapy Goals        Problem: Physical Therapy Goal    Goal Priority Disciplines Outcome Goal Variances Interventions   Physical Therapy Goal     PT, PT/OT Ongoing, Progressing     Description: Goals to be met by: 3/4/22     Patient will increase functional independence with mobility by performin. Supine <> sit with supervision  2. Sit to stand transfer with supervision  3. Bed to chair transfer with Supervision using Rolling Walker  4. Gait  x 50 feet with Supervision using Rolling Walker.   5. Lower extremity exercise program x 10 reps per handout, with supervision                     Time Tracking:     PT Received On: 22  PT Start Time: 1114     PT Stop Time: 1137  PT Total Time (min): 23 min     Billable Minutes: Therapeutic Activity 12 and Therapeutic Exercise 11    Treatment Type: Treatment  PT/PTA: PTA     PTA Visit Number: 2     2022

## 2022-02-16 NOTE — PLAN OF CARE
02/16/22 1658   Post-Acute Status   Post-Acute Authorization Home Health   Post-Acute Placement Status Set-up Complete/Auth obtained  (patient accepted per [facility] on Friday 2/18)   Discharge Plan   Discharge Plan A Home Health

## 2022-02-16 NOTE — PROGRESS NOTES
[facility] Medicine  Telemedicine Progress Note    Patient Name: Tasha Hawley  MRN: [id number]  Patient Class: IP- Inpatient   Admission Date: 2/3/2022  Length of Stay: 13 days  Attending Physician: Cristiane Villatoro MD  Primary Care Provider: Mesfin Hodges Ii, MD          Subjective:     Principal Problem:Acute renal failure with tubular necrosis        HPI:  Tasha Hawley is a 67 yo female with a pmh of neurogenic bladder, suprapubic catheter,recurrent UTI, chronic pain with epidural pain pump, CAD, hypothyroidism, sleep apnea. She c/o malaise, poor PO intake, N/V, chills, decreased urine output, weakness, chronic pain. She was seen by ID today and noted to have cloudy urine. She just finished a course of cipro and has recurrent UTIs. She states she has not been feeling well for a few weeks. She feels as though she is leaking urine from her urethra. She has home health with [place] and states they changed her catheter 2 days ago (no record on chart) and she is not sure if a urine sample was obtained at that time. She has chronic swelling to her lower legs and walks with a walker. She fell a few days ago with no injury.       Overview/Hospital Course:  No notes on file    Interval History: no acute events overnight.  D/w patient dc with daughter to .  She was not agreeable initially, stating that she wants to go home.  She did agree to go to her daughter's house as a temporary measure in order to get strong enough to return home.    Review of Systems   Constitutional: Positive for activity change and fatigue. Negative for fever.   HENT: Negative for congestion, sore throat and trouble swallowing.    Eyes: Negative for visual disturbance.   Respiratory: Negative for cough, shortness of breath and wheezing.    Cardiovascular: Negative for chest pain, palpitations and leg swelling.   Gastrointestinal: Negative for abdominal distention, abdominal pain, constipation, diarrhea, nausea and  vomiting.   Genitourinary: Negative for decreased urine volume and difficulty urinating.   Musculoskeletal: Positive for arthralgias. Negative for myalgias.   Skin: Positive for rash.   Neurological: Positive for weakness. Negative for dizziness and syncope.   Psychiatric/Behavioral: Negative for agitation and confusion.     Objective:     Vital Signs (Most Recent):  Temp: 97.6 °F (36.4 °C) (02/16/22 1116)  Pulse: (!) 53 (02/16/22 1204)  Resp: 14 (02/16/22 1116)  BP: (!) 107/58 (02/16/22 1116)  SpO2: (!) 91 % (02/16/22 0728) Vital Signs (24h Range):  Temp:  [97.6 °F (36.4 °C)-99.3 °F (37.4 °C)] 97.6 °F (36.4 °C)  Pulse:  [49-67] 53  Resp:  [14-18] 14  SpO2:  [87 %-93 %] 91 %  BP: ()/(51-70) 107/58     Weight: 95.6 kg (210 lb 12.2 oz)  Body mass index is 36.18 kg/m².    Intake/Output Summary (Last 24 hours) at 2/16/2022 1517  Last data filed at 2/16/2022 1400  Gross per 24 hour   Intake 240 ml   Output 4290 ml   Net -4050 ml      Physical Exam  Constitutional:       General: She is not in acute distress.     Appearance: Normal appearance. She is well-developed.   HENT:      Head: Normocephalic and atraumatic.   Eyes:      Pupils: Pupils are equal, round, and reactive to light.   Cardiovascular:      Rate and Rhythm: Normal rate.   Pulmonary:      Effort: Pulmonary effort is normal. No respiratory distress.   Genitourinary:     Comments: deferred  Musculoskeletal:         General: Normal range of motion.      Cervical back: Neck supple.   Skin:     Findings: No erythema or rash.   Neurological:      General: No focal deficit present.      Mental Status: She is alert and oriented to person, place, and time.   Psychiatric:         Attention and Perception: Attention normal.         Mood and Affect: Affect is flat.         Behavior: Behavior normal.         Significant Labs:   All pertinent labs within the past 24 hours have been reviewed.  BMP:   Recent Labs   Lab 02/16/22  0836   GLU 79      K 4.8       CO2 22*   BUN 9   CREATININE 1.2   CALCIUM 8.6*       Significant Imaging: I have reviewed all pertinent imaging results/findings within the past 24 hours.      Assessment/Plan:      * Acute renal failure with tubular necrosis  BUN 22. Creatinine 7.2, baseline 1.2  Reports poor PO intake, N/V, decreased urine output  S/p 2.5L IV fluids in ED  -initially treated with IV fluids without improvement; will stop  -nephrology was consulted   -hold torsemide  -renally dose medications  - cr normalizing   -repeat BMP daily  -cont to improve. Good urine output.  -discharge planning to SNF    - cr WNL-appears to have resolved-    Abdominal pain  - hyoscyamine prn added  - PPI  - Maalox PRN  - bowel regimen  - xr abd-shows constipation.  PO dulcolax given yesterday with one BM noted.  Continue current regimen and monitor closely.    Anxiety   - hydroxyzine held due to lethargy  - Increase seroquel to 50mg BID  - resume nightly Trazodone   - scheduled seroquel     Hyponatremia  Rash had Na 129, baseline 139  In setting of CARITO and poor PO intake  -given 2.5L LR in ED  -cont LR   -monitor labs  -hold torsemide  -nephrology consulted  -resolved    Bilateral lower extremity edema  Chronic  Hold torsemide for renal failure; elevate BLE when up in chair      Urinary tract infection associated with cystostomy catheter  Recurrent UTI, completed course of cipro 2 days ago  Followed by ID and urology outpatient  Dark, cloudy urine noted. Patient c/o malaise, chills, N/V, weakness, decreased PO intake  Given a dose of rocephin in ED  -switched to cefepime given recent urine culture sensitivities on 1/21  -urine and blood cx pending; also with candida in urine which may be colonization, on fluconazole for now per Nephro  -consult urology for catheter change and eval, performed on 2/4  -completed treatment           Chronic diastolic heart failure  Denies SOB, does have chronic BLE  No respiratory distress noted on room air  -chest XRAY  with bilateral atelectasis; incentive spirometry  -hold torsemide 2/2 renal failure-no s/s of heart failure.  Continue to hold torsemide and monitor closely.      Bradycardia  Chronic  EKG 2/12 shows sinus bradycardia  HR 40s-50s, asymptomatic  Continue Cardiac monitoring      Primary hypothyroidism  Patient has chronic hypothyroidism. TFTs reviewed-   Lab Results   Component Value Date    TSH 0.960 07/08/2021   . Will continue chronic levothyroxine and adjust for and clinical changes.      Frequent falls  Consult PT/OT for eval: recommend SNF  Maintain fall precautions; reinforced to patient to call for assistance.      Neurogenic bladder  Has chronic indwelling meadows-care per nursing staff  -cont ditropan  -discontinue zanaflex        Chronic pain syndrome  Has dilaudid intrathecal pump.  Medicated for breakthrough pain today with norco following which some mild hypotension was noted.  Pain medication changed from prn norco to tramadol bid prn.        Major depressive disorder, recurrent, mild  Chronic; stable.  Continue chronic SSRI.    Sleep apnea  Non-compliant with CPAP at home  O2 sat drops while sleeping  -CPAP at night        VTE Risk Mitigation (From admission, onward)         Ordered     enoxaparin injection 40 mg  Daily         02/13/22 1148     IP VTE HIGH RISK PATIENT  Once         02/03/22 1734     Place sequential compression device  Until discontinued         02/03/22 1734                      I have assessed these finding virtually using telemed platform.       The attending portion of this evaluation, treatment, and documentation was performed per HEVER Mejias via Telemedicine AudioVisual using the secure [employer] software platform with 2 way audio/video. The provider was located off-site and the patient is located in the hospital. The aforementioned video software was utilized to document the relevant history and physical exam    HEVER Mejias  Department of Hospital Medicine    [facility]

## 2022-02-16 NOTE — PLAN OF CARE
TN spoke with patient's daughter Lisa in regards to [facility] unable to accept patient due to care exceeds current capacity. Lisa to discuss with patient where she wants to go. Lisa to call TN back.      Pt and pt's spouse requesting to speak to TN per bedside nurse. TN discussed anticipated discharge plan with pt and pt's spouse. Pt states she does not want to go the [facility] to stay with her daughter on discharge. Pt wants to stay with spouse. Pt's spouse states he is unable to provide 24/7 care at this time. TN offered sitter resources to pt's spouse; pt's spouse declined. Pt's spouse prefers patient to go to a SNF facility on discharge.TN provided pt's spouse's with [facility]'s contact number to discuss pt's trust fund/open claim with medicare and negotiate possible acceptance at [facility]

## 2022-02-16 NOTE — SUBJECTIVE & OBJECTIVE
Interval History: no acute events overnight.  D/w patient dc with daughter to .  She was not agreeable initially, stating that she wants to go home.  She did agree to go to her daughter's house as a temporary measure in order to get strong enough to return home.    Review of Systems   Constitutional: Positive for activity change and fatigue. Negative for fever.   HENT: Negative for congestion, sore throat and trouble swallowing.    Eyes: Negative for visual disturbance.   Respiratory: Negative for cough, shortness of breath and wheezing.    Cardiovascular: Negative for chest pain, palpitations and leg swelling.   Gastrointestinal: Negative for abdominal distention, abdominal pain, constipation, diarrhea, nausea and vomiting.   Genitourinary: Negative for decreased urine volume and difficulty urinating.   Musculoskeletal: Positive for arthralgias. Negative for myalgias.   Skin: Positive for rash.   Neurological: Positive for weakness. Negative for dizziness and syncope.   Psychiatric/Behavioral: Negative for agitation and confusion.     Objective:     Vital Signs (Most Recent):  Temp: 97.6 °F (36.4 °C) (02/16/22 1116)  Pulse: (!) 53 (02/16/22 1204)  Resp: 14 (02/16/22 1116)  BP: (!) 107/58 (02/16/22 1116)  SpO2: (!) 91 % (02/16/22 0728) Vital Signs (24h Range):  Temp:  [97.6 °F (36.4 °C)-99.3 °F (37.4 °C)] 97.6 °F (36.4 °C)  Pulse:  [49-67] 53  Resp:  [14-18] 14  SpO2:  [87 %-93 %] 91 %  BP: ()/(51-70) 107/58     Weight: 95.6 kg (210 lb 12.2 oz)  Body mass index is 36.18 kg/m².    Intake/Output Summary (Last 24 hours) at 2/16/2022 1517  Last data filed at 2/16/2022 1400  Gross per 24 hour   Intake 240 ml   Output 4290 ml   Net -4050 ml      Physical Exam  Constitutional:       General: She is not in acute distress.     Appearance: Normal appearance. She is well-developed.   HENT:      Head: Normocephalic and atraumatic.   Eyes:      Pupils: Pupils are equal, round, and reactive to light.   Cardiovascular:       Rate and Rhythm: Normal rate.   Pulmonary:      Effort: Pulmonary effort is normal. No respiratory distress.   Genitourinary:     Comments: deferred  Musculoskeletal:         General: Normal range of motion.      Cervical back: Neck supple.   Skin:     Findings: No erythema or rash.   Neurological:      General: No focal deficit present.      Mental Status: She is alert and oriented to person, place, and time.   Psychiatric:         Attention and Perception: Attention normal.         Mood and Affect: Affect is flat.         Behavior: Behavior normal.         Significant Labs:   All pertinent labs within the past 24 hours have been reviewed.  BMP:   Recent Labs   Lab 02/16/22  0836   GLU 79      K 4.8      CO2 22*   BUN 9   CREATININE 1.2   CALCIUM 8.6*       Significant Imaging: I have reviewed all pertinent imaging results/findings within the past 24 hours.

## 2022-02-16 NOTE — PLAN OF CARE
Pt now requesting to discharge home with home health to daughter Lisa's home on the [facility]. TN updated attending team. D/c anticipated tomorrow prior to noon.     HH [facility].    Pt's daughter Lisa [address] [place] [address]       02/16/22 1524   Post-Acute Status   Post-Acute Authorization Placement;Home Health   Post-Acute Placement Status Discharge Plan Changed   Home Health Status Referrals Sent   Discharge Plan   Discharge Plan A Home Health

## 2022-02-16 NOTE — ASSESSMENT & PLAN NOTE
Has dilaudid intrathecal pump.  Medicated for breakthrough pain today with norco following which some mild hypotension was noted.  Pain medication changed from prn norco to tramadol bid prn.       99

## 2022-02-16 NOTE — PLAN OF CARE
Problem: Occupational Therapy Goal  Goal: Occupational Therapy Goal  Description: Goals to be met by: 3/4/22     Patient will increase functional independence with ADLs by performing:    LE Dressing with Minimal Assistance.  Grooming while standing with Supervision.  Toileting from toilet with Supervision for hygiene and clothing management.   Supine to sit with Supervision.  Step transfer with Supervision  Toilet transfer to toilet with Supervision.  Increased functional strength to WFL for self care skills and functional mobility.  Upper extremity exercise program x10 reps per handout, with independence.    Outcome: Ongoing, Progressing       Pt reporting fatigue, nausea and dizziness this AM. Limited session performed. Cont OT POC

## 2022-02-16 NOTE — PT/OT/SLP PROGRESS
Occupational Therapy   Treatment    Name: Tasha Hawley  MRN: [id number]  Admitting Diagnosis:  Acute renal failure with tubular necrosis       Recommendations:     Discharge Recommendations: nursing facility, skilled  Discharge Equipment Recommendations:  walker, rolling  Barriers to discharge:   (increased level of assist at this time)    Assessment:     Tasha Hawley is a 66 y.o. female with a medical diagnosis of Acute renal failure with tubular necrosis.  She presents with The primary encounter diagnosis was CARITO (acute kidney injury). Diagnoses of Chest pain, QT prolongation, and Acute renal failure with tubular necrosis were also pertinent to this visit.  . Performance deficits affecting function are weakness,impaired balance,impaired endurance,decreased safety awareness,impaired skin,edema,impaired self care skills,impaired functional mobilty,gait instability,decreased lower extremity function,decreased upper extremity function,impaired cognition.     Pt reporting fatigue, nausea and dizziness this AM. Limited session performed. Cont OT POC     Rehab Prognosis:  Good; patient would benefit from acute skilled OT services to address these deficits and reach maximum level of function.       Plan:     Patient to be seen 3 x/week to address the above listed problems via self-care/home management,therapeutic activities,therapeutic exercises  · Plan of Care Expires: 03/04/22  · Plan of Care Reviewed with: patient    Subjective     Pain/Comfort:  · Pain Rating 1: 0/10    Objective:     Communicated with: aminah prior to session.  General Precautions: Standard, fall,hearing impaired   Orthopedic Precautions:N/A   Braces: N/A     Occupational Performance:     Bed Mobility:    · Patient completed Sit to Supine with stand by assistance     Functional Mobility/Transfers:  · Patient completed Sit <> Stand Transfer with contact guard assistance  with  rolling walker   · Patient completed Toilet Transfer Step Transfer  technique with contact guard assistance with  rolling walker  · Functional Mobility: CGA with RW; forward flexed over RW; poor proximity to RW     Activities of Daily Living:  · Grooming: contact guard assistance standing at sink; forward flexed; BUE propping on sink   · Lower Body Dressing: moderate assistance assist wtih threading meadows through underwear   · Toileting: minimum assistance clothing management       [facility] 6 Click ADL: 18    Treatment & Education:  Pt found in bathroom with PCT  Required assist with clothing management; threading catheter through pant leg   Stood x 2 trials from toilet with verbal cues for hand placement   Functional mobility limited in room 2/2 fatigue. Pt reports dizziness and nausea   Stood at sink for G&H axs as above   Returned to supine     Patient left supine with all lines intact, call button in reach, bed alarm on, nsg notified and FARHAD system presentEducation:      GOALS:   Multidisciplinary Problems     Occupational Therapy Goals        Problem: Occupational Therapy Goal    Goal Priority Disciplines Outcome Interventions   Occupational Therapy Goal     OT, PT/OT Ongoing, Progressing    Description: Goals to be met by: 3/4/22     Patient will increase functional independence with ADLs by performing:    LE Dressing with Minimal Assistance.  Grooming while standing with Supervision.  Toileting from toilet with Supervision for hygiene and clothing management.   Supine to sit with Supervision.  Step transfer with Supervision  Toilet transfer to toilet with Supervision.  Increased functional strength to WFL for self care skills and functional mobility.  Upper extremity exercise program x10 reps per handout, with independence.                     Time Tracking:     OT Date of Treatment: 02/16/22  OT Start Time: 0920  OT Stop Time: 0934  OT Total Time (min): 14 min    Billable Minutes:Self Care/Home Management 14    OT/SAMANTHA: OT     SAMANTHA Visit Number: 0    2/16/2022

## 2022-02-16 NOTE — PROGRESS NOTES
02/16/2022 @ 2:03 PM- RSW attempted to meet with patient for a follow-up visit. RSW unable to complete follow-up visit due to pt sleeping and not responding to RSW attempt to wake pt up. RSW will follow up with patient at a later time.    02/17/2022 @ 11:04 AM- RSW attempted to contact pt via room phone to discuss discharge and additional barriers to care. RSW did not receive answer. RSW will follow up with patient at a later time.      IP Liaison - Final Visit Note    Patient: Tasha Hawley  MRN:  [id number]  Date of Service:  2/17/2022  Completed by:  ML Weir    Reason for Visit   Patient presents with    IP Liaison Chart Review     Patient discharged from hospital before RSW was able to complete follow-up visit.        Patient Summary     Discharge Date: 02/17/2022  Discharge telephone number/address: [phone] / [address] [place] [address]  Follow up provider: Shireen Mayo MD  Follow up appointments: 2/28/2022 @ 3:40pm  Home Health agency & telephone number: [facility]  DME ordered &  name: RW delivered to pt bedside  Assigned OPCM RN/SW: n/a  Report sent to follow up team (PCP/OPCM) via in basket message: n/a  Community Resources arranged including agency name & contact info: n/a      ML Weir

## 2022-02-17 VITALS
RESPIRATION RATE: 18 BRPM | WEIGHT: 205.5 LBS | HEART RATE: 67 BPM | HEIGHT: 64 IN | SYSTOLIC BLOOD PRESSURE: 105 MMHG | BODY MASS INDEX: 35.08 KG/M2 | TEMPERATURE: 98 F | OXYGEN SATURATION: 90 % | DIASTOLIC BLOOD PRESSURE: 54 MMHG

## 2022-02-17 LAB
ALBUMIN SERPL BCP-MCNC: 2.9 G/DL (ref 3.5–5.2)
ANION GAP SERPL CALC-SCNC: 11 MMOL/L (ref 8–16)
BUN SERPL-MCNC: 8 MG/DL (ref 8–23)
CALCIUM SERPL-MCNC: 8.8 MG/DL (ref 8.7–10.5)
CHLORIDE SERPL-SCNC: 108 MMOL/L (ref 95–110)
CO2 SERPL-SCNC: 20 MMOL/L (ref 23–29)
CREAT SERPL-MCNC: 1.1 MG/DL (ref 0.5–1.4)
EST. GFR  (AFRICAN AMERICAN): >60 ML/MIN/1.73 M^2
EST. GFR  (NON AFRICAN AMERICAN): 53 ML/MIN/1.73 M^2
GLUCOSE SERPL-MCNC: 81 MG/DL (ref 70–110)
PHOSPHATE SERPL-MCNC: 3.4 MG/DL (ref 2.7–4.5)
POTASSIUM SERPL-SCNC: 4 MMOL/L (ref 3.5–5.1)
SODIUM SERPL-SCNC: 139 MMOL/L (ref 136–145)

## 2022-02-17 PROCEDURE — 94761 N-INVAS EAR/PLS OXIMETRY MLT: CPT | Mod: HCNC

## 2022-02-17 PROCEDURE — 25000003 PHARM REV CODE 250: Mod: HCNC | Performed by: INTERNAL MEDICINE

## 2022-02-17 PROCEDURE — 36415 COLL VENOUS BLD VENIPUNCTURE: CPT | Mod: HCNC | Performed by: STUDENT IN AN ORGANIZED HEALTH CARE EDUCATION/TRAINING PROGRAM

## 2022-02-17 PROCEDURE — 25000003 PHARM REV CODE 250: Mod: HCNC | Performed by: NURSE PRACTITIONER

## 2022-02-17 PROCEDURE — 99900035 HC TECH TIME PER 15 MIN (STAT): Mod: HCNC

## 2022-02-17 PROCEDURE — 94799 UNLISTED PULMONARY SVC/PX: CPT | Mod: HCNC

## 2022-02-17 PROCEDURE — 80069 RENAL FUNCTION PANEL: CPT | Mod: HCNC | Performed by: STUDENT IN AN ORGANIZED HEALTH CARE EDUCATION/TRAINING PROGRAM

## 2022-02-17 RX ADMIN — LEVOTHYROXINE SODIUM 125 MCG: 0.03 TABLET ORAL at 05:02

## 2022-02-17 RX ADMIN — OXYBUTYNIN CHLORIDE 15 MG: 5 TABLET, EXTENDED RELEASE ORAL at 08:02

## 2022-02-17 RX ADMIN — ASPIRIN 81 MG: 81 TABLET, COATED ORAL at 08:02

## 2022-02-17 RX ADMIN — STANDARDIZED SENNA CONCENTRATE 2 TABLET: 8.6 TABLET ORAL at 08:02

## 2022-02-17 RX ADMIN — PANTOPRAZOLE SODIUM 40 MG: 40 TABLET, DELAYED RELEASE ORAL at 08:02

## 2022-02-17 RX ADMIN — QUETIAPINE FUMARATE 50 MG: 25 TABLET ORAL at 08:02

## 2022-02-17 RX ADMIN — FLUOXETINE HYDROCHLORIDE 60 MG: 20 CAPSULE ORAL at 08:02

## 2022-02-17 RX ADMIN — VALACYCLOVIR HYDROCHLORIDE 1000 MG: 500 TABLET, FILM COATED ORAL at 08:02

## 2022-02-17 RX ADMIN — ATORVASTATIN CALCIUM 80 MG: 40 TABLET, FILM COATED ORAL at 08:02

## 2022-02-17 NOTE — HOSPITAL COURSE
Ms. Alcantar was admitted to  for management of renal failure.  Creatinine was 7.2 on admit up from a baseline of 1.2.  Nephro was consulted.  She was started on IVF without improvement.  Nephrotoxic meds were held and Creatinine gradually improved to 1.1 on discharge.  She was originally going to SNF, but then decided to go home with .

## 2022-02-17 NOTE — PLAN OF CARE
Recommendation:   1. Continue current renal, low K diet as tolerated.   2. Continue to provide Novasource Renal BID.  3. Monitor weight/labs.   4. RD to follow and monitor nutrition status    Goals:   Pt intake >/= 75% EEN/EPN by RD follow up    Nutrition Goal Status: progressing towards goal  Communication of RD Recs: other (comment) (POC)      Problem: Oral Intake Inadequate (Acute Kidney Injury/Impairment)  Goal: Optimal Nutrition Intake  Outcome: Ongoing, Progressing

## 2022-02-17 NOTE — PLAN OF CARE
vn note: vn cued into room. Attempted to review AVS with pt via vidyo. Pt is [place] and deaf in right ear. Pt requested AVS to reviewed at bedside. Bedside nurse notified.

## 2022-02-17 NOTE — DISCHARGE SUMMARY
[facility] Medicine  Discharge Summary      Patient Name: Tasha Hawley  MRN: [id number]  Patient Class: IP- Inpatient  Admission Date: 2/3/2022  Hospital Length of Stay: 14 days  Discharge Date and Time:  02/17/2022 10:42 AM  Attending Physician: Rebecca Condon MD   Discharging Provider: Rebecca Condon MD  Primary Care Provider: Mesfin Hodges Ii, MD      HPI:   Tasha Hawley is a 65 yo female with a pmh of neurogenic bladder, suprapubic catheter,recurrent UTI, chronic pain with epidural pain pump, CAD, hypothyroidism, sleep apnea. She c/o malaise, poor PO intake, N/V, chills, decreased urine output, weakness, chronic pain. She was seen by ID today and noted to have cloudy urine. She just finished a course of cipro and has recurrent UTIs. She states she has not been feeling well for a few weeks. She feels as though she is leaking urine from her urethra. She has home health with [place] and states they changed her catheter 2 days ago (no record on chart) and she is not sure if a urine sample was obtained at that time. She has chronic swelling to her lower legs and walks with a walker. She fell a few days ago with no injury.       * No surgery found *      Hospital Course:   Ms. Alcantar was admitted to  for management of renal failure.  Creatinine was 7.2 on admit up from a baseline of 1.2.  Nephro was consulted.  She was started on IVF without improvement.  Nephrotoxic meds were held and Creatinine gradually improved to 1.1 on discharge.  She was originally going to SNF, but then decided to go home with .       Goals of Care Treatment Preferences:  Code Status: Full Code    Physical Exam:  Temp:  [97.5 °F (36.4 °C)-99.1 °F (37.3 °C)]   Pulse:  [51-68]   Resp:  [14-18]   BP: ()/(51-66)   SpO2:  [90 %-94 %]     Constitutional: Appears stated age  Eyes: No scleral icterus.  No eye discharge  Pulmonary/Chest: Effort normal. No respiratory distress  Abdominal: No  distension.  Musculoskeletal: Moving all extremities  Neurological: Alert.  Oriented to person, place, and time.   Psych:  Normal behavior.  Skin: No erythema.  No cyanosis    Consults:   Consults (From admission, onward)        Status Ordering Provider     Inpatient virtual consult to Hospital Medicine  Once        Provider:  (Not yet assigned)    Completed SARAN KEYS     Inpatient consult to Social Work  Once        Provider:  (Not yet assigned)    Acknowledged SARAN KEYS     Inpatient consult to Nephrology[facility] (Cm Wheeler, Juan Luis)  Once        Provider:  Jennifer Pabon MD    Completed KELL AGUILERA     Inpatient consult to Urology  Once        Provider:  (Not yet assigned)    Completed KELL AGUILERA          * Acute renal failure with tubular necrosis  BUN 22. Creatinine 7.2, baseline 1.2  Reports poor PO intake, N/V, decreased urine output  S/p 2.5L IV fluids in ED  -initially treated with IV fluids without improvement; will stop  -nephrology was consulted   -hold torsemide  -renally dose medications  - cr normalizing   -repeat BMP daily  -cont to improve. Good urine output.  - cr WNL-appears to have resolved-    Abdominal pain  - hyoscyamine prn added  - PPI  - Maalox PRN  - bowel regimen  - xr abd-shows constipation.    -Continue current regimen and monitor closely.    Anxiety   - hydroxyzine held due to lethargy  - resume nightly Trazodone   - scheduled seroquel     Hyponatremia  Rash had Na 129, baseline 139  In setting of CARITO and poor PO intake  -given 2.5L LR in ED  -cont LR   -monitor labs  -hold torsemide  -nephrology consulted  -resolved    Bilateral lower extremity edema  Chronic  Hold torsemide for renal failure; elevate BLE when up in chair      Urinary tract infection associated with cystostomy catheter  Recurrent UTI, completed course of cipro 2 days ago  Followed by ID and urology outpatient  Dark, cloudy urine noted. Patient c/o malaise, chills,  N/V, weakness, decreased PO intake  Given a dose of rocephin in ED  -switched to cefepime given recent urine culture sensitivities on 1/21  -urine and blood cx pending; also with candida in urine which may be colonization, on fluconazole for now per Nephro  -consult urology for catheter change and eval, performed on 2/4  -completed treatment           Chronic diastolic heart failure  Denies SOB, does have chronic BLE  No respiratory distress noted on room air  -chest XRAY with bilateral atelectasis; incentive spirometry  -hold torsemide 2/2 renal failure-no s/s of heart failure.  Continue to hold torsemide and monitor closely.      Bradycardia  Chronic  EKG 2/12 shows sinus bradycardia  HR 40s-50s, asymptomatic  Hold Metoprolol  Continue Cardiac monitoring      Primary hypothyroidism  Patient has chronic hypothyroidism. TFTs reviewed-   Lab Results   Component Value Date    TSH 0.960 07/08/2021   . Will continue chronic levothyroxine and adjust for and clinical changes.      Frequent falls  Consult PT/OT for eval: recommend SNF  Maintain fall precautions; reinforced to patient to call for assistance.  Now wants to go home with HH      Neurogenic bladder  Has chronic indwelling meaodws-care per nursing staff  -cont ditropan  -discontinue zanaflex        Chronic pain syndrome  Has dilaudid intrathecal pump.  Medicated for breakthrough pain today with norco following which some mild hypotension was noted.  Pain medication changed from prn norco to tramadol bid prn.        Major depressive disorder, recurrent, mild  Chronic; stable.  Continue chronic SSRI.    Sleep apnea  Non-compliant with CPAP at home  O2 sat drops while sleeping  -CPAP at night        Final Active Diagnoses:    Diagnosis Date Noted POA    PRINCIPAL PROBLEM:  Acute renal failure with tubular necrosis [N17.0] 02/03/2022 Yes    Abdominal pain [R10.9] 02/06/2022 No    Anxiety [F41.9] 02/05/2022 Yes    Hyponatremia [E87.1] 02/04/2022 Yes    Bilateral  "lower extremity edema [R60.0] 07/07/2021 Yes    Urinary tract infection associated with cystostomy catheter [T83.510A, N39.0] 05/12/2020 Yes    Chronic diastolic heart failure [I50.32] 01/13/2020 Yes     Chronic    Bradycardia [R00.1] 11/14/2017 Yes    Primary hypothyroidism [E03.9] 02/02/2017 Yes     Chronic    Frequent falls [R29.6] 02/01/2017 Not Applicable    Neurogenic bladder [N31.9] 09/27/2016 Yes     Chronic    Chronic pain syndrome [G89.4] 05/01/2013 Yes     Chronic    Major depressive disorder, recurrent, mild [F33.0] 02/21/2013 Yes    Sleep apnea [G47.30]  Yes      Problems Resolved During this Admission:       Discharged Condition: fair    Disposition: Home-Health Care [facility]    Follow Up:   Follow-up Information     [facility].    Specialties: Home Health Services, Home Therapy Services, Home Living Aide Services  Why: Home Health Services  Contact information:  [address] [place] [id number] [phone]                     Patient Instructions:      WALKER FOR HOME USE     Order Specific Question Answer Comments   Type of Walker: Adult (5'4"-6'6")    With wheels? Yes    Height: 5' 4" (1.626 m)    Weight: 93.2 kg (205 lb 7.5 oz)    Length of need (1-99 months): 99    Does patient have medical equipment at home? bedside commode    Does patient have medical equipment at home? rollator    Please check all that apply: Patient's condition impairs ambulation.      Ambulatory referral/consult to Home Health   Standing Status: Future   Referral Priority: Routine Referral Type: Home Health   Referral Reason: Specialty Services Required   Requested Specialty: Home Health Services   Number of Visits Requested: 1       Medications:  Reconciled Home Medications:      Medication List      START taking these medications    valACYclovir 1000 MG tablet  Commonly known as: VALTREX  Take 1 tablet (1,000 mg total) by mouth 2 (two) times daily. for 4 days        CONTINUE taking " these medications    aspirin 81 MG EC tablet  Commonly known as: ECOTRIN  Take 1 tablet (81 mg total) by mouth once daily.     atorvastatin 80 MG tablet  Commonly known as: LIPITOR  TAKE ONE TABLET BY MOUTH EVERY DAY     butalbital-acetaminophen-caffeine -40 mg -40 mg per tablet  Commonly known as: FIORICET, ESGIC  Take 1 tablet by mouth daily as needed.     FLUoxetine 20 MG capsule  Take 3 capsules (60 mg total) by mouth once daily.     INTRATHECAL PAIN PUMP COMPOUND  Hydromorphone (7.5 mg/mL) infusion at 3.6799 mg/day (0.1533 mg/hr) out of a total reservoir volume of 37.3 mL  Pump filled every 2 months     levothyroxine 125 MCG tablet  Commonly known as: SYNTHROID  Take 1 tablet (125 mcg total) by mouth before breakfast.     LIDOcaine 5 %  Commonly known as: LIDODERM  daily as needed.     nitroGLYCERIN 0.4 MG SL tablet  Commonly known as: NITROSTAT  Place 1 tablet (0.4 mg total) under the tongue every 5 (five) minutes as needed for Chest pain.     oxybutynin 15 MG Tr24  Commonly known as: DITROPAN XL  Take 1 tablet (15 mg total) by mouth once daily.     pantoprazole 40 MG tablet  Commonly known as: PROTONIX  TAKE ONE TABLET BY MOUTH EVERY DAY     potassium chloride 10 MEQ Cpsr  Commonly known as: MICRO-K  Take 2 capsules by mouth once daily     promethazine 25 MG tablet  Commonly known as: PHENERGAN  Take 25 mg by mouth every 6 (six) hours as needed for Nausea.     * QUEtiapine 25 MG Tab  Commonly known as: SEROQUEL  Take 12.5-25 mg twice daily as needed for anxiety     * QUEtiapine 100 MG Tab  Commonly known as: SEROQUEL  Take 2 tablets (200 mg total) by mouth nightly.     senna 8.6 mg tablet  Commonly known as: SENNA LAX  Take 2 tablets by mouth 2 (two) times daily.     traZODone 100 MG tablet  Commonly known as: DESYREL  Take 3 tablets (300 mg total) by mouth every evening.         * This list has 2 medication(s) that are the same as other medications prescribed for you. Read the directions  carefully, and ask your doctor or other care provider to review them with you.            STOP taking these medications    ciprofloxacin HCl 750 MG tablet  Commonly known as: CIPRO     HYDROcodone-acetaminophen  mg per tablet  Commonly known as: NORCO     tiZANidine 4 MG tablet  Commonly known as: ZANAFLEX     torsemide 100 MG Tab  Commonly known as: DEMADEX            Indwelling Lines/Drains at time of discharge:   Lines/Drains/Airways     Drain                 Suprapubic Catheter 02/04/22 0909 18 Fr. 13 days          Line                 Subcutaneous Infusion Pump Abdomen (Comment) -- days                Time spent on the discharge of patient: 35 minutes        Rebecca Condon MD  Department of Hospital Medicine  [facility]

## 2022-02-17 NOTE — PLAN OF CARE
Discharge order. TN spoke with pt and pt daughter Lisa prior to discharge. Patient wants to discharge to ER due to her friend is bring her spouse to ER for possible syncope episode. Pt's daughter is aware. TN informed pt's daughter that pt unable to return to hospital room due to being discharge and pt would have to be picked up from ER. Pt's daughter verbalized understanding.    Home Health Services: [facility]    DME: RW      discharge nurse will go over home medications and reasons for medications and final discharge instructions.      Patient can be discharged from CM standpoint    Future Appointments   Date Time Provider Department Center   2/28/2022  3:40 PM Shireen Mayo MD [facility] UROLOGY [facility]   3/8/2022  9:40 AM Mesfin Hodges II, MD [facility] IM [facility] PCW   3/22/2022  3:00 PM Juan A Madera MD [facility] PSYCH [facility]        02/17/22 1246   Final Note   Assessment Type Final Discharge Note   Anticipated Discharge Disposition Home-Health  ([facility])   What phone number can be called within the next 1-3 days to see how you are doing after discharge? [phone]   Hospital Resources/Appts/Education Provided Appointments scheduled by Navigator/Coordinator;Provided education on problems/symptoms using teachback;Appointments scheduled and added to AVS;Provided patient/caregiver with written discharge plan information;Post-Acute resouces added to AVS   Post-Acute Status   Post-Acute Authorization Home Health   Home Health Status Set-up Complete/Auth obtained

## 2022-02-17 NOTE — ASSESSMENT & PLAN NOTE
- hyoscyamine prn added  - PPI  - Maalox PRN  - bowel regimen  - xr abd-shows constipation.    -Continue current regimen and monitor closely.

## 2022-02-17 NOTE — NURSING
Patient left floor on wheelchair with transport.  Patient's IV, tele removed and vital signs within normal limits.

## 2022-02-17 NOTE — PLAN OF CARE
[facility]  Home Health Orders  Face to Face Encounter    Patient Name: Tasha Hawley  YOB: 1955    PCP: Mesfin Hodges Ii, MD   PCP Address: [address] / [place] [address]  PCP Phone Number: [phone]  PCP Fax: [phone]    Encounter Date: 02/17/2022    Admit To Home Health    Diagnoses:  Active Hospital Problems    Diagnosis  POA    *Acute renal failure with tubular necrosis [N17.0]  Yes    Abdominal pain [R10.9]  No    Anxiety [F41.9]  Yes    Hyponatremia [E87.1]  Yes    Bilateral lower extremity edema [R60.0]  Yes    Urinary tract infection associated with cystostomy catheter [T83.510A, N39.0]  Yes    Chronic diastolic heart failure [I50.32]  Yes     Chronic    Bradycardia [R00.1]  Yes    Primary hypothyroidism [E03.9]  Yes     Chronic    Frequent falls [R29.6]  Not Applicable    Neurogenic bladder [N31.9]  Yes     Chronic    Chronic pain syndrome [G89.4]  Yes     Chronic    Major depressive disorder, recurrent, mild [F33.0]  Yes    Sleep apnea [G47.30]  Yes      Resolved Hospital Problems   No resolved problems to display.       Future Appointments   Date Time Provider Department Center   2/28/2022  3:40 PM Shireen Mayo MD [facility] UROLOGY [facility]   3/8/2022  9:40 AM Mesfin Hodges II, MD [facility] IM [facility] PCW   3/22/2022  3:00 PM Juan A Madera MD [facility] PSYCH [facility] [place]      Follow-up Information     [place] [facility].    Specialties: Home Health Services, Home Therapy Services, Home Living Aide Services  Why: Home Health Services  Contact information:  [address] [place] [phone]                         I have seen and examined this patient face to face today. My clinical findings that support the need for the home health skilled services and home bound status are the following:  Weakness/numbness causing balance and gait disturbance due to Weakness/Debility making it taxing to leave  home.      Allergies:  Review of patient's allergies indicates:   Allergen Reactions    (d)-limonene flavor      Other reaction(s): difficult intubation  Other reaction(s): Difficulty breathing    Bactrim [sulfamethoxazole-trimethoprim] Anaphylaxis    Benadryl [diphenhydramine hcl] Shortness Of Breath    Fentanyl Itching, Nausea And Vomiting and Swelling             Imitrex [sumatriptan succinate] Shortness Of Breath    Topamax [topiramate] Shortness Of Breath    Vancomycin Shortness Of Breath     Rash    Butorphanol tartrate     Darvocet a500 [propoxyphene n-acetaminophen]      Other reaction(s): Difficulty breathing    White petrolatum-zinc     Zinc oxide-white petrolatum      Other reaction(s): Difficulty breathing    Latex, natural rubber Itching and Rash    Phenytoin Rash and Other (See Comments)     Trouble breathing         Diet: cardiac diet      Referrals/ Consults  Physical Therapy to evaluate and treat. Evaluate for home safety and equipment needs; Establish/upgrade home exercise program. Perform / instruct on therapeutic exercises, gait training, transfer training, and Range of Motion.  Occupational Therapy to evaluate and treat. Evaluate home environment for safety and equipment needs. Perform/Instruct on transfers, ADL training, ROM, and therapeutic exercises.      Activities:   activity as tolerated      Wound Care Orders  no      Nursing:   Agency to admit patient within 24 hours of hospital discharge unless specified on physician order or at patient request    SN to complete comprehensive assessment including routine vital signs. Instruct on disease process and s/s of complications to report to MD. Review/verify medication list sent home with the patient at time of discharge  and instruct patient/caregiver as needed. Frequency may be adjusted depending on start of care date.     Skilled nurse to perform up to 3 visits PRN for symptoms related to diagnosis    Notify MD if SBP > 160 or <  90; DBP > 90 or < 50; HR > 120 or < 50; Temp > 101; O2 < 88%    Ok to schedule additional visits based on staff availability and patient request on consecutive days within the home health episode.    When multiple disciplines ordered:    Start of Care occurs on Sunday - Wednesday schedule remaining discipline evaluations as ordered on separate consecutive days following the start of care.    Thursday SOC -schedule subsequent evaluations Friday and Monday the following week.     Friday - Saturday SOC - schedule subsequent discipline evaluations on consecutive days starting Monday of the following week.    For all post-discharge communication and subsequent orders please contact patient's primary care physician. If unable to reach primary care physician or do not receive response within 30 minutes, please contact [facility] on call for clinical staff order clarification      Medications: Review discharge medications with patient and family and provide education.      Current Discharge Medication List      START taking these medications    Details   valACYclovir (VALTREX) 1000 MG tablet Take 1 tablet (1,000 mg total) by mouth 2 (two) times daily. for 4 days  Qty: 8 tablet, Refills: 0         CONTINUE these medications which have NOT CHANGED    Details   aspirin (ECOTRIN) 81 MG EC tablet Take 1 tablet (81 mg total) by mouth once daily.  Refills: 0      atorvastatin (LIPITOR) 80 MG tablet TAKE ONE TABLET BY MOUTH EVERY DAY  Qty: 90 tablet, Refills: 3    Associated Diagnoses: Mixed hyperlipidemia      butalbital-acetaminophen-caffeine -40 mg (FIORICET, ESGIC) -40 mg per tablet Take 1 tablet by mouth daily as needed.  Qty: 15 tablet, Refills: 0      FLUoxetine 20 MG capsule Take 3 capsules (60 mg total) by mouth once daily.  Qty: 270 capsule, Refills: 1    Associated Diagnoses: Anxiety      levothyroxine (SYNTHROID) 125 MCG tablet Take 1 tablet (125 mcg total) by mouth before breakfast.  Qty: 90 tablet, Refills: 3     Associated Diagnoses: Primary hypothyroidism      lidocaine (LIDODERM) 5 % daily as needed.       nitroGLYCERIN (NITROSTAT) 0.4 MG SL tablet Place 1 tablet (0.4 mg total) under the tongue every 5 (five) minutes as needed for Chest pain.  Qty: 25 tablet, Refills: 3    Associated Diagnoses: Coronary artery disease involving native coronary artery of native heart with angina pectoris      oxybutynin (DITROPAN XL) 15 MG TR24 Take 1 tablet (15 mg total) by mouth once daily.  Qty: 90 tablet, Refills: 3    Associated Diagnoses: Neurogenic bladder      pantoprazole (PROTONIX) 40 MG tablet TAKE ONE TABLET BY MOUTH EVERY DAY  Qty: 90 tablet, Refills: 3      potassium chloride (MICRO-K) 10 MEQ CpSR Take 2 capsules by mouth once daily  Qty: 180 capsule, Refills: 3    Comments: [facility] - EMERGENCY FILL      promethazine (PHENERGAN) 25 MG tablet Take 25 mg by mouth every 6 (six) hours as needed for Nausea.      !! QUEtiapine (SEROQUEL) 100 MG Tab Take 2 tablets (200 mg total) by mouth nightly.  Qty: 180 tablet, Refills: 3    Comments: Take 200 mg at bedtime  Associated Diagnoses: Insomnia due to medical condition      !! QUEtiapine (SEROQUEL) 25 MG Tab Take 12.5-25 mg twice daily as needed for anxiety  Qty: 180 tablet, Refills: 1      senna (SENNA LAX) 8.6 mg tablet Take 2 tablets by mouth 2 (two) times daily.      traZODone (DESYREL) 100 MG tablet Take 3 tablets (300 mg total) by mouth every evening.  Qty: 270 tablet, Refills: 1    Associated Diagnoses: Insomnia, unspecified type      intrathecal pain pump compound Hydromorphone (7.5 mg/mL) infusion at 3.6799 mg/day (0.1533 mg/hr) out of a total reservoir volume of 37.3 mL  Pump filled every 2 months       !! - Potential duplicate medications found. Please discuss with provider.      STOP taking these medications       HYDROcodone-acetaminophen (NORCO)  mg per tablet Comments:   Reason for Stopping:         tiZANidine (ZANAFLEX) 4 MG tablet Comments:   Reason for  Stopping:         torsemide (DEMADEX) 100 MG Tab Comments:   Reason for Stopping:         ciprofloxacin HCl (CIPRO) 750 MG tablet Comments:   Reason for Stopping:               Future Appointments   Date Time Provider Department Center   2/28/2022  3:40 PM Shireen Mayo MD [facility] UROLOGY [facility] [place]   3/8/2022  9:40 AM Mesfin Hodges II, MD [facility] IM [facility] [place] PCW   3/22/2022  3:00 PM Juan A Madera MD [facility] PSYCH [facility]       I certify that this patient is confined to her home and needs intermittent skilled nursing care, physical therapy and occupational therapy.    Rebecca Condon MD  [facility] Physician  [facility] Medicine

## 2022-02-17 NOTE — PROGRESS NOTES
[facility] - Med Surg  Adult Nutrition  Progress Note    SUMMARY       Recommendations    Recommendation:   1. Continue current renal, low K diet as tolerated.   2. Continue to provide Novasource Renal BID.  3. Monitor weight/labs.   4. RD to follow and monitor nutrition status    Goals:   Pt intake >/= 75% EEN/EPN by RD follow up    Nutrition Goal Status: progressing towards goal  Communication of RD Recs: other (comment) (POC)    Assessment and Plan    Abdominal pain  Contributing Nutrition Diagnosis  Inadequate energy intake    Related to (etiology):   Abdominal pain     Signs and Symptoms (as evidenced by):   Pt reports upper and lower abdominal pain, pt with acute renal failure with tubular necrosis, and LBM 2/2/22.      Interventions:  Collaboration with other providers  [employer]- Novasource Renal BID    Nutrition Diagnosis Status:   Improving, pt had BM on 2/16 and is consuming % of meals             Reason for Assessment    Reason For Assessment: RD follow-up  Diagnosis: renal disease (acute renal failure with tubular necrosis)  Relevant Medical History: anxiety, depression, hypothyroid, BLE edema, CAD, CHF, renal disorder, cervical fusion/spine surgery, cardiac catheterization  Interdisciplinary Rounds: did not attend  General Information Comments: Pt receiving renal,low K diet with Novasource Renal TID, % intake of meals. Per chart pt's mental status at baseline, pending possible discharge Friday. PIV, subcutaneous infusion pump. NFPE not completed today 2/2 pt did not consent and pt with video provider  Nutrition Discharge Planning: d/c on renal diet with Novasource Renal 1x/day or similar    Nutrition Risk Screen    Nutrition Risk Screen: no indicators present    Nutrition/Diet History    Food Preferences: no cultural or spiritual food preferences identified  Spiritual, Cultural Beliefs, Shinto Practices, Values that Affect Care: no  Food Allergies: other (see comments)  "(citrus)  Factors Affecting Nutritional Intake: behavioral (mealtime) (limited acceptance of foods offered)    Anthropometrics    Temp: 98.6 °F (37 °C)  Height Method: Stated  Height: 5' 4" (162.6 cm)  Height (inches): 64 in  Weight Method: Bed Scale  Weight: 95.6 kg (210 lb 12.2 oz)  Weight (lb): 210.76 lb  Ideal Body Weight (IBW), Female: 120 lb  % Ideal Body Weight, Female (lb): 173.62 %  BMI (Calculated): 36.2  BMI Grade: 35 - 39.9 - obesity - grade II       Lab/Procedures/Meds    Pertinent Labs Reviewed: reviewed  Pertinent Labs Comments: Pt receiving renal diet with Novasource Renal TID, 50-75% intake of meals. Did not eat breakfast today because she wanted something different. Pt also endorases upper and lower abdominal pain, with LBM 2/2/22. Per chart pt's mental status at baseline, pending SNF placement. PIV, subcutaneous infusion pump. NFPE not completed today 2/2 pt did not consent  Pertinent Medications Reviewed: reviewed  Pertinent Medications Comments: aspirin, statin, bisacodyl, enoxaparin, fluoxetine, levothyroxine, oxybutynin, pantoprazole, poly glycol, quetiapine, senna, trazodone, valacyclovir, intrathecal pain pump compound    Estimated/Assessed Needs    Weight Used For Calorie Calculations: 94.5 kg (208 lb 5.4 oz)  Energy Calorie Requirements (kcal): 1890  Energy Need Method: Kcal/kg (20 kcal/kg)  Protein Requirements: 76 (0.8 gm/kg 2/2 renal disorder)  Weight Used For Protein Calculations: 94.5 kg (208 lb 5.4 oz)     Estimated Fluid Requirement Method: RDA Method (or PER MD)  RDA Method (mL): 1890         Nutrition Prescription Ordered    Current Diet Order: Renal  Oral Nutrition Supplement: Novasource Renal TID    Evaluation of Received Nutrient/Fluid Intake    I/O: 720/3140  Energy Calories Required: meeting needs  Protein Required: meeting needs  Fluid Required: meeting needs  Comments: LBM 2/16  % Intake of Estimated Energy Needs: 75 - 100 %  % Meal Intake: 75 - 100 %    Nutrition " Risk    Level of Risk/Frequency of Follow-up:  (2x/week)     Monitor and Evaluation    Food and Nutrient Intake: energy intake,food and beverage intake  Food and Nutrient Adminstration: diet order  Knowledge/Beliefs/Attitudes: food and nutrition knowledge/skill  Physical Activity and Function: nutrition-related ADLs and IADLs  Anthropometric Measurements: weight,weight change,body mass index  Biochemical Data, Medical Tests and Procedures: electrolyte and renal panel,gastrointestinal profile,glucose/endocrine profile,inflammatory profile,lipid profile     Nutrition Follow-Up    RD Follow-up?: Yes

## 2022-02-17 NOTE — ASSESSMENT & PLAN NOTE
Chronic  EKG 2/12 shows sinus bradycardia  HR 40s-50s, asymptomatic  Hold Metoprolol  Continue Cardiac monitoring

## 2022-02-17 NOTE — ASSESSMENT & PLAN NOTE
BUN 22. Creatinine 7.2, baseline 1.2  Reports poor PO intake, N/V, decreased urine output  S/p 2.5L IV fluids in ED  -initially treated with IV fluids without improvement; will stop  -nephrology was consulted   -hold torsemide  -renally dose medications  - cr normalizing   -repeat BMP daily  -cont to improve. Good urine output.  - cr WNL-appears to have resolved-

## 2022-02-17 NOTE — ASSESSMENT & PLAN NOTE
Consult PT/OT for eval: recommend SNF  Maintain fall precautions; reinforced to patient to call for assistance.  Now wants to go home with HH

## 2022-02-18 ENCOUNTER — TELEPHONE (OUTPATIENT)
Dept: INTERNAL MEDICINE | Facility: CLINIC | Age: 67
End: 2022-02-18
Payer: MEDICARE

## 2022-02-18 PROCEDURE — G0180 MD CERTIFICATION HHA PATIENT: HCPCS | Mod: ,,, | Performed by: UROLOGY

## 2022-02-18 PROCEDURE — G0180 PR HOME HEALTH MD CERTIFICATION: ICD-10-PCS | Mod: ,,, | Performed by: UROLOGY

## 2022-02-18 NOTE — TELEPHONE ENCOUNTER
----- Message from Nandini Blevins sent at 2/18/2022  8:27 AM CST -----  Contact: [phone]  [employer] nurse called to let Dr. Hodges know that patient was just discharged from the hospital due to kidney failure and would like for the nurse to please reach out to her. Please advise

## 2022-02-21 ENCOUNTER — TELEPHONE (OUTPATIENT)
Dept: UROLOGY | Facility: CLINIC | Age: 67
End: 2022-02-21
Payer: MEDICARE

## 2022-02-21 NOTE — TELEPHONE ENCOUNTER
----- Message from Shireen Mayo MD sent at 2/17/2022 12:10 PM CST -----  Regarding: RE: Hosp f/u  She needs to follow up with nephrology for this issue.   ----- Message -----  From: Christi Wang LPN  Sent: 2/9/2022  10:42 AM CST  To: Shireen Mayo MD  Subject: FW: Hosp f/u                                     DX: Acute renal failure with tubular necrosis     Pt being discharged from [facility] for above diagnosis. She is currently scheduled at about 2 weeks out. Do you want to see her sooner? Please advise.     ----- Message -----  From: Paola Palma  Sent: 2/9/2022  10:00 AM CST  To: [facility] Staff  Subject: FW: Hosp f/u                                     Good Morning,  I need to update DC Nurse to chart and to relay appointment information to patient prior to discharge. Please advise.    Avis Agustin  ----- Message -----  From: Paola Palma  Sent: 2/7/2022   4:32 PM CST  To: [facility] Staff  Subject: Hosp f/u                                         Patient is discharging from [facility] and is needing a hospital f/u scheduled.  Please schedule and message me back so Discharge can advise patient prior to discharge.    DX: Acute renal failure with tubular necrosis    ThanksAvis  Access Navigator/Discharge

## 2022-02-23 ENCOUNTER — PATIENT OUTREACH (OUTPATIENT)
Dept: ADMINISTRATIVE | Facility: OTHER | Age: 67
End: 2022-02-23
Payer: MEDICARE

## 2022-02-23 NOTE — PROGRESS NOTES
LINKS immunization registry updated  Care Everywhere updated  Health Maintenance updated  Chart reviewed for overdue Proactive [facility] ([facility]) health maintenance testing (CRS, Breast Ca, Diabetic Eye Exam)   Orders entered:N/A

## 2022-02-24 ENCOUNTER — HOSPITAL ENCOUNTER (EMERGENCY)
Facility: HOSPITAL | Age: 67
Discharge: HOME | End: 2022-02-24
Attending: EMERGENCY MEDICINE
Payer: MEDICARE

## 2022-02-24 ENCOUNTER — OFFICE VISIT (OUTPATIENT)
Dept: INTERNAL MEDICINE | Facility: CLINIC | Age: 67
End: 2022-02-24
Payer: MEDICARE

## 2022-02-24 VITALS
BODY MASS INDEX: 32.41 KG/M2 | OXYGEN SATURATION: 97 % | WEIGHT: 189.81 LBS | DIASTOLIC BLOOD PRESSURE: 76 MMHG | HEART RATE: 61 BPM | SYSTOLIC BLOOD PRESSURE: 112 MMHG | HEIGHT: 64 IN

## 2022-02-24 VITALS
TEMPERATURE: 98 F | RESPIRATION RATE: 16 BRPM | SYSTOLIC BLOOD PRESSURE: 148 MMHG | DIASTOLIC BLOOD PRESSURE: 72 MMHG | OXYGEN SATURATION: 96 % | HEART RATE: 78 BPM

## 2022-02-24 DIAGNOSIS — R11.2 NON-INTRACTABLE VOMITING WITH NAUSEA, UNSPECIFIED VOMITING TYPE: Primary | ICD-10-CM

## 2022-02-24 DIAGNOSIS — R11.2 INTRACTABLE VOMITING WITH NAUSEA, UNSPECIFIED VOMITING TYPE: ICD-10-CM

## 2022-02-24 DIAGNOSIS — R10.9 FLANK PAIN: ICD-10-CM

## 2022-02-24 DIAGNOSIS — N12 PYELONEPHRITIS: Primary | ICD-10-CM

## 2022-02-24 LAB
ALBUMIN SERPL BCP-MCNC: 4.4 G/DL (ref 3.5–5.2)
ALP SERPL-CCNC: 127 U/L (ref 55–135)
ALT SERPL W/O P-5'-P-CCNC: 9 U/L (ref 10–44)
ANION GAP SERPL CALC-SCNC: 16 MMOL/L (ref 8–16)
AST SERPL-CCNC: 19 U/L (ref 10–40)
BACTERIA #/AREA URNS AUTO: ABNORMAL /HPF
BASOPHILS # BLD AUTO: 0.02 K/UL (ref 0–0.2)
BASOPHILS NFR BLD: 0.3 % (ref 0–1.9)
BILIRUB SERPL-MCNC: 0.6 MG/DL (ref 0.1–1)
BILIRUB UR QL STRIP: NEGATIVE
BUN SERPL-MCNC: 16 MG/DL (ref 8–23)
CALCIUM SERPL-MCNC: 10.2 MG/DL (ref 8.7–10.5)
CHLORIDE SERPL-SCNC: 93 MMOL/L (ref 95–110)
CLARITY UR REFRACT.AUTO: ABNORMAL
CO2 SERPL-SCNC: 26 MMOL/L (ref 23–29)
COLOR UR AUTO: YELLOW
CREAT SERPL-MCNC: 1.4 MG/DL (ref 0.5–1.4)
DIFFERENTIAL METHOD: ABNORMAL
EOSINOPHIL # BLD AUTO: 0.2 K/UL (ref 0–0.5)
EOSINOPHIL NFR BLD: 2.8 % (ref 0–8)
ERYTHROCYTE [DISTWIDTH] IN BLOOD BY AUTOMATED COUNT: 14.8 % (ref 11.5–14.5)
EST. GFR  (AFRICAN AMERICAN): 45.5 ML/MIN/1.73 M^2
EST. GFR  (NON AFRICAN AMERICAN): 39.5 ML/MIN/1.73 M^2
GLUCOSE SERPL-MCNC: 83 MG/DL (ref 70–110)
GLUCOSE UR QL STRIP: NEGATIVE
HCT VFR BLD AUTO: 35.2 % (ref 37–48.5)
HGB BLD-MCNC: 11.4 G/DL (ref 12–16)
HGB UR QL STRIP: ABNORMAL
IMM GRANULOCYTES # BLD AUTO: 0.01 K/UL (ref 0–0.04)
IMM GRANULOCYTES NFR BLD AUTO: 0.2 % (ref 0–0.5)
KETONES UR QL STRIP: NEGATIVE
LACTATE SERPL-SCNC: 1.8 MMOL/L (ref 0.5–2.2)
LEUKOCYTE ESTERASE UR QL STRIP: ABNORMAL
LYMPHOCYTES # BLD AUTO: 1.3 K/UL (ref 1–4.8)
LYMPHOCYTES NFR BLD: 19.9 % (ref 18–48)
MCH RBC QN AUTO: 28.8 PG (ref 27–31)
MCHC RBC AUTO-ENTMCNC: 32.4 G/DL (ref 32–36)
MCV RBC AUTO: 89 FL (ref 82–98)
MICROSCOPIC COMMENT: ABNORMAL
MONOCYTES # BLD AUTO: 0.4 K/UL (ref 0.3–1)
MONOCYTES NFR BLD: 6.8 % (ref 4–15)
NEUTROPHILS # BLD AUTO: 4.5 K/UL (ref 1.8–7.7)
NEUTROPHILS NFR BLD: 70 % (ref 38–73)
NITRITE UR QL STRIP: NEGATIVE
NRBC BLD-RTO: 0 /100 WBC
PH UR STRIP: 5 [PH] (ref 5–8)
PLATELET # BLD AUTO: 186 K/UL (ref 150–450)
PMV BLD AUTO: 10.6 FL (ref 9.2–12.9)
POTASSIUM SERPL-SCNC: 3.5 MMOL/L (ref 3.5–5.1)
PROT SERPL-MCNC: 8.4 G/DL (ref 6–8.4)
PROT UR QL STRIP: NEGATIVE
RBC # BLD AUTO: 3.96 M/UL (ref 4–5.4)
RBC #/AREA URNS AUTO: 1 /HPF (ref 0–4)
SODIUM SERPL-SCNC: 135 MMOL/L (ref 136–145)
SP GR UR STRIP: 1 (ref 1–1.03)
URN SPEC COLLECT METH UR: ABNORMAL
WBC # BLD AUTO: 6.44 K/UL (ref 3.9–12.7)
WBC #/AREA URNS AUTO: 3 /HPF (ref 0–5)
YEAST UR QL AUTO: ABNORMAL

## 2022-02-24 PROCEDURE — 1160F RVW MEDS BY RX/DR IN RCRD: CPT | Mod: CPTII,S$GLB,, | Performed by: INTERNAL MEDICINE

## 2022-02-24 PROCEDURE — 1111F PR DISCHARGE MEDS RECONCILED W/ CURRENT OUTPATIENT MED LIST: ICD-10-PCS | Mod: CPTII,S$GLB,, | Performed by: INTERNAL MEDICINE

## 2022-02-24 PROCEDURE — 3008F BODY MASS INDEX DOCD: CPT | Mod: CPTII,S$GLB,, | Performed by: INTERNAL MEDICINE

## 2022-02-24 PROCEDURE — 3078F DIAST BP <80 MM HG: CPT | Mod: CPTII,S$GLB,, | Performed by: INTERNAL MEDICINE

## 2022-02-24 PROCEDURE — 3074F SYST BP LT 130 MM HG: CPT | Mod: CPTII,S$GLB,, | Performed by: INTERNAL MEDICINE

## 2022-02-24 PROCEDURE — 99999 PR PBB SHADOW E&M-EST. PATIENT-LVL V: CPT | Mod: PBBFAC,,, | Performed by: INTERNAL MEDICINE

## 2022-02-24 PROCEDURE — 1125F PR PAIN SEVERITY QUANTIFIED, PAIN PRESENT: ICD-10-PCS | Mod: CPTII,S$GLB,, | Performed by: INTERNAL MEDICINE

## 2022-02-24 PROCEDURE — 99284 PR EMERGENCY DEPT VISIT,LEVEL IV: ICD-10-PCS | Mod: ,,, | Performed by: PHYSICIAN ASSISTANT

## 2022-02-24 PROCEDURE — 3288F FALL RISK ASSESSMENT DOCD: CPT | Mod: CPTII,S$GLB,, | Performed by: INTERNAL MEDICINE

## 2022-02-24 PROCEDURE — 3074F PR MOST RECENT SYSTOLIC BLOOD PRESSURE < 130 MM HG: ICD-10-PCS | Mod: CPTII,S$GLB,, | Performed by: INTERNAL MEDICINE

## 2022-02-24 PROCEDURE — 1100F PTFALLS ASSESS-DOCD GE2>/YR: CPT | Mod: CPTII,S$GLB,, | Performed by: INTERNAL MEDICINE

## 2022-02-24 PROCEDURE — 99284 EMERGENCY DEPT VISIT MOD MDM: CPT | Mod: 25

## 2022-02-24 PROCEDURE — 3008F PR BODY MASS INDEX (BMI) DOCUMENTED: ICD-10-PCS | Mod: CPTII,S$GLB,, | Performed by: INTERNAL MEDICINE

## 2022-02-24 PROCEDURE — 99215 PR OFFICE/OUTPT VISIT, EST, LEVL V, 40-54 MIN: ICD-10-PCS | Mod: S$GLB,,, | Performed by: INTERNAL MEDICINE

## 2022-02-24 PROCEDURE — 3078F PR MOST RECENT DIASTOLIC BLOOD PRESSURE < 80 MM HG: ICD-10-PCS | Mod: CPTII,S$GLB,, | Performed by: INTERNAL MEDICINE

## 2022-02-24 PROCEDURE — 96361 HYDRATE IV INFUSION ADD-ON: CPT

## 2022-02-24 PROCEDURE — 87040 BLOOD CULTURE FOR BACTERIA: CPT | Performed by: PHYSICIAN ASSISTANT

## 2022-02-24 PROCEDURE — 99215 OFFICE O/P EST HI 40 MIN: CPT | Mod: S$GLB,,, | Performed by: INTERNAL MEDICINE

## 2022-02-24 PROCEDURE — 83605 ASSAY OF LACTIC ACID: CPT | Performed by: PHYSICIAN ASSISTANT

## 2022-02-24 PROCEDURE — 1125F AMNT PAIN NOTED PAIN PRSNT: CPT | Mod: CPTII,S$GLB,, | Performed by: INTERNAL MEDICINE

## 2022-02-24 PROCEDURE — 99999 PR PBB SHADOW E&M-EST. PATIENT-LVL V: ICD-10-PCS | Mod: PBBFAC,,, | Performed by: INTERNAL MEDICINE

## 2022-02-24 PROCEDURE — 85025 COMPLETE CBC W/AUTO DIFF WBC: CPT | Performed by: PHYSICIAN ASSISTANT

## 2022-02-24 PROCEDURE — 3288F PR FALLS RISK ASSESSMENT DOCUMENTED: ICD-10-PCS | Mod: CPTII,S$GLB,, | Performed by: INTERNAL MEDICINE

## 2022-02-24 PROCEDURE — 81001 URINALYSIS AUTO W/SCOPE: CPT | Performed by: PHYSICIAN ASSISTANT

## 2022-02-24 PROCEDURE — 99499 RISK ADDL DX/OHS AUDIT: ICD-10-PCS | Mod: S$GLB,,, | Performed by: INTERNAL MEDICINE

## 2022-02-24 PROCEDURE — 99284 EMERGENCY DEPT VISIT MOD MDM: CPT | Mod: ,,, | Performed by: PHYSICIAN ASSISTANT

## 2022-02-24 PROCEDURE — 1160F PR REVIEW ALL MEDS BY PRESCRIBER/CLIN PHARMACIST DOCUMENTED: ICD-10-PCS | Mod: CPTII,S$GLB,, | Performed by: INTERNAL MEDICINE

## 2022-02-24 PROCEDURE — 1159F PR MEDICATION LIST DOCUMENTED IN MEDICAL RECORD: ICD-10-PCS | Mod: CPTII,S$GLB,, | Performed by: INTERNAL MEDICINE

## 2022-02-24 PROCEDURE — 1100F PR PT FALLS ASSESS DOC 2+ FALLS/FALL W/INJURY/YR: ICD-10-PCS | Mod: CPTII,S$GLB,, | Performed by: INTERNAL MEDICINE

## 2022-02-24 PROCEDURE — 99215 OFFICE O/P EST HI 40 MIN: CPT | Mod: PBBFAC | Performed by: INTERNAL MEDICINE

## 2022-02-24 PROCEDURE — 96374 THER/PROPH/DIAG INJ IV PUSH: CPT

## 2022-02-24 PROCEDURE — 99499 UNLISTED E&M SERVICE: CPT | Mod: S$GLB,,, | Performed by: INTERNAL MEDICINE

## 2022-02-24 PROCEDURE — 80053 COMPREHEN METABOLIC PANEL: CPT | Performed by: PHYSICIAN ASSISTANT

## 2022-02-24 PROCEDURE — 1159F MED LIST DOCD IN RCRD: CPT | Mod: CPTII,S$GLB,, | Performed by: INTERNAL MEDICINE

## 2022-02-24 PROCEDURE — 1111F DSCHRG MED/CURRENT MED MERGE: CPT | Mod: CPTII,S$GLB,, | Performed by: INTERNAL MEDICINE

## 2022-02-24 PROCEDURE — 63600175 PHARM REV CODE 636 W HCPCS: Performed by: PHYSICIAN ASSISTANT

## 2022-02-24 RX ORDER — ONDANSETRON 4 MG/1
4 TABLET, ORALLY DISINTEGRATING ORAL EVERY 8 HOURS PRN
Qty: 12 TABLET | Refills: 0 | Status: SHIPPED | OUTPATIENT
Start: 2022-02-24 | End: 2022-04-29

## 2022-02-24 RX ORDER — ONDANSETRON 4 MG/1
4 TABLET, ORALLY DISINTEGRATING ORAL EVERY 8 HOURS PRN
Qty: 12 TABLET | Refills: 0 | Status: SHIPPED | OUTPATIENT
Start: 2022-02-24 | End: 2022-02-24 | Stop reason: SDUPTHER

## 2022-02-24 RX ORDER — ONDANSETRON 2 MG/ML
4 INJECTION INTRAMUSCULAR; INTRAVENOUS
Status: COMPLETED | OUTPATIENT
Start: 2022-02-24 | End: 2022-02-24

## 2022-02-24 RX ADMIN — ONDANSETRON 4 MG: 2 INJECTION INTRAMUSCULAR; INTRAVENOUS at 01:02

## 2022-02-24 NOTE — PROGRESS NOTES
"Chief Complaint  Chief Complaint   Patient presents with    Hospital Follow Up       HPI  Tasha Hawley is a 66 y.o. female with a complicated medical history that presents for hospital follow-up s/p admission for acute renal failure.  Their last appointment with primary care was 7/22/21.     Mrs. Hawley was presented to the hospital on 2/3/22 with complaints of "malaise, poor PO intake, N/V, chills, decreased urine output, weakness, chronic pain" per Dr. Condon. The patient reports that she feels similar today as she did on the day she was admitted. She reports intractable vomiting that has increased in frequency since discharge; she describes 10 episodes overnight/this morning and inability to keep food down. She has used phenergan with limited effect. She has had relief in the past with Zofran but has run out. Her current oral intake consists of water and Gatorade. She describes intermittent chills, but says that she is cold at baseline. She denies subjective/objective fever.   She reports general malaise and a new onset flank pain that radiates to her lower back. The pain has been gradually worsening over the past 3 days. She rates the pain at 9/10. Her suprapubic catheter was last changes during her hospital admission, but she reports that Home Health has noted discharge at the insertion site, as well as darkening urine over the past 2-3 days.      Prior hospital course: 2/3-2/17 (14 days admitted). Upon admission, she was noted to have cloudy urine and stated that she had been feeling unwell for a few weeks. She was admitted for acute renal failure with an initial Cr of 7.2. She was administered IVF and all nephrotoxic drugs were held; Cr gradually returned to baseline of 1.1 prior to discharge with Home Health.     PAST MEDICAL HISTORY:  Past Medical History:   Diagnosis Date    Anticoagulant long-term use     Anxiety     Arthritis     Bilateral lower extremity edema     severe chronic    Carotid " artery occlusion     Cataract     CHF (congestive heart failure)     Coronary artery disease     subtotalled LAD with collateral    Depression     Fever blister     Hypothyroid     Iron deficiency anemia     Lumbar radiculopathy     with chronic pain    Ocular migraine     Renal disorder     Sleep apnea     cpap       PAST SURGICAL HISTORY:  Past Surgical History:   Procedure Laterality Date    ADENOIDECTOMY      BACK SURGERY      x 12    CARDIAC CATHETERIZATION  2016    subtotalled LAD with right to left collaterals    CATARACT EXTRACTION W/  INTRAOCULAR LENS IMPLANT Left     Dr Coleman     CYSTOSCOPIC LITHOLAPAXY N/A 6/27/2019    Procedure: CYSTOLITHOLAPAXY;  Surgeon: Shireen Mayo MD;  Location: [facility] OR 2ND FLR;  Service: Urology;  Laterality: N/A;    CYSTOSCOPIC LITHOLAPAXY N/A 9/3/2019    Procedure: CYSTOLITHOLAPAXY;  Surgeon: Shireen Mayo MD;  Location: [facility] OR 2ND FLR;  Service: Urology;  Laterality: N/A;    CYSTOSCOPY N/A 7/13/2021    Procedure: CYSTOSCOPY;  Surgeon: Shireen Mayo MD;  Location: [facility] OR 1ST FLR;  Service: Urology;  Laterality: N/A;    CYSTOSCOPY  11/16/2021    Procedure: CYSTOSCOPY;  Surgeon: Shireen Mayo MD;  Location: [facility] OR 1ST FLR;  Service: Urology;;    ESOPHAGOGASTRODUODENOSCOPY N/A 5/23/2018    Procedure: ESOPHAGOGASTRODUODENOSCOPY (EGD);  Surgeon: Prince Vance MD;  Location: [facility] (4TH FLR);  Service: Endoscopy;  Laterality: N/A;  r/s 'd per Dr. Vance due to family emergency- ER    HYSTERECTOMY  1975    endometriosis    INJECTION OF BOTULINUM TOXIN TYPE A  7/13/2021    Procedure: INJECTION, BOTULINUM TOXIN, 200units;  Surgeon: Shireen Mayo MD;  Location: [facility] OR 1ST FLR;  Service: Urology;;    INJECTION OF BOTULINUM TOXIN TYPE A  11/16/2021    Procedure: INJECTION, BOTULINUM TOXIN, 200units;  Surgeon: Shireen Mayo MD;  Location: [facility] OR 1ST FLR;  Service: Urology;;    pain pump placement      SQ Dilaudid Pump managed by  Dr. Hillman, Pain Management    REPLACEMENT OF CATHETER N/A 10/31/2019    Procedure: REPLACEMENT, CATHETER-SUPRAPUBIC;  Surgeon: Shireen Mayo MD;  Location: [facility] OR [address] [place] [address];  Service: Urology;  Laterality: N/A;    SPINAL CORD STIMULATOR REMOVAL      before [place]    SPINE SURGERY  5-13-13    CERVICAL FUSION    TONSILLECTOMY         SOCIAL HISTORY:  Social History     Socioeconomic History    Marital status:    Tobacco Use    Smoking status: Never Smoker    Smokeless tobacco: Never Used   Substance and Sexual Activity    Alcohol use: Never    Drug use: No    Sexual activity: Never     Partners: Male     Social Determinants of Health     Financial Resource Strain: Low Risk     Difficulty of Paying Living Expenses: Not very hard   Food Insecurity: No Food Insecurity    Worried About Running Out of Food in the Last Year: Never true    Ran Out of Food in the Last Year: Never true   Transportation Needs: No Transportation Needs    Lack of Transportation (Medical): No    Lack of Transportation (Non-Medical): No   Physical Activity: Inactive    Days of Exercise per Week: 0 days    Minutes of Exercise per Session: 0 min   Housing Stability: Low Risk     Unable to Pay for Housing in the Last Year: No    Number of Places Lived in the Last Year: 1    Unstable Housing in the Last Year: No       FAMILY HISTORY:  Family History   Problem Relation Age of Onset    Cancer Mother 55        breast    Cancer Father         esophagus,had laryngectomy    Esophageal cancer Father     Parkinsonism Maternal Grandmother     Tremor Maternal Grandmother     No Known Problems Brother     No Known Problems Brother     Heart disease Maternal Uncle     Colon cancer Maternal Uncle         Less than 60    No Known Problems Sister     No Known Problems Maternal Aunt     Cirrhosis Paternal Aunt         ETOH    Liver disease Paternal Aunt         ETOH    Liver disease Paternal Uncle         ETOH     Cirrhosis Paternal Uncle         ETOH    No Known Problems Maternal Grandfather     No Known Problems Paternal Grandmother     No Known Problems Paternal Grandfather     Melanoma Neg Hx     Amblyopia Neg Hx     Blindness Neg Hx     Cataracts Neg Hx     Diabetes Neg Hx     Glaucoma Neg Hx     Hypertension Neg Hx     Macular degeneration Neg Hx     Retinal detachment Neg Hx     Strabismus Neg Hx     Stroke Neg Hx     Thyroid disease Neg Hx     Celiac disease Neg Hx     Colon polyps Neg Hx     Cystic fibrosis Neg Hx     Crohn's disease Neg Hx     Inflammatory bowel disease Neg Hx     Liver cancer Neg Hx     Rectal cancer Neg Hx     Stomach cancer Neg Hx     Ulcerative colitis Neg Hx     Lymphoma Neg Hx        ALLERGIES AND MEDICATIONS: updated and reviewed.  Review of patient's allergies indicates:   Allergen Reactions    (d)-limonene flavor      Other reaction(s): difficult intubation  Other reaction(s): Difficulty breathing    Bactrim [sulfamethoxazole-trimethoprim] Anaphylaxis    Benadryl [diphenhydramine hcl] Shortness Of Breath    Fentanyl Itching, Nausea And Vomiting and Swelling             Imitrex [sumatriptan succinate] Shortness Of Breath    Topamax [topiramate] Shortness Of Breath    Vancomycin Shortness Of Breath     Rash    Butorphanol tartrate     Darvocet a500 [propoxyphene n-acetaminophen]      Other reaction(s): Difficulty breathing    White petrolatum-zinc     Zinc oxide-white petrolatum      Other reaction(s): Difficulty breathing    Latex, natural rubber Itching and Rash    Phenytoin Rash and Other (See Comments)     Trouble breathing     Current Outpatient Medications   Medication Sig Dispense Refill    aspirin (ECOTRIN) 81 MG EC tablet Take 1 tablet (81 mg total) by mouth once daily.  0    atorvastatin (LIPITOR) 80 MG tablet TAKE ONE TABLET BY MOUTH EVERY DAY 90 tablet 3    butalbital-acetaminophen-caffeine -40 mg (FIORICET, ESGIC) -40 mg per  tablet Take 1 tablet by mouth daily as needed. 15 tablet 0    FLUoxetine 20 MG capsule Take 3 capsules (60 mg total) by mouth once daily. 270 capsule 1    intrathecal pain pump compound Hydromorphone (7.5 mg/mL) infusion at 3.6799 mg/day (0.1533 mg/hr) out of a total reservoir volume of 37.3 mL  Pump filled every 2 months      levothyroxine (SYNTHROID) 125 MCG tablet Take 1 tablet (125 mcg total) by mouth before breakfast. 90 tablet 3    lidocaine (LIDODERM) 5 % daily as needed.       nitroGLYCERIN (NITROSTAT) 0.4 MG SL tablet Place 1 tablet (0.4 mg total) under the tongue every 5 (five) minutes as needed for Chest pain. 25 tablet 3    oxybutynin (DITROPAN XL) 15 MG TR24 Take 1 tablet (15 mg total) by mouth once daily. 90 tablet 3    pantoprazole (PROTONIX) 40 MG tablet TAKE ONE TABLET BY MOUTH EVERY DAY 90 tablet 3    potassium chloride (MICRO-K) 10 MEQ CpSR Take 2 capsules by mouth once daily 180 capsule 3    promethazine (PHENERGAN) 25 MG tablet Take 25 mg by mouth every 6 (six) hours as needed for Nausea.      QUEtiapine (SEROQUEL) 100 MG Tab Take 2 tablets (200 mg total) by mouth nightly. 180 tablet 3    QUEtiapine (SEROQUEL) 25 MG Tab Take 12.5-25 mg twice daily as needed for anxiety 180 tablet 1    senna (SENNA LAX) 8.6 mg tablet Take 2 tablets by mouth 2 (two) times daily.      traZODone (DESYREL) 100 MG tablet Take 3 tablets (300 mg total) by mouth every evening. 270 tablet 1    valACYclovir (VALTREX) 1000 MG tablet Take 1 tablet (1,000 mg total) by mouth 2 (two) times daily. for 4 days 8 tablet 0     No current facility-administered medications for this visit.         ROS  Review of Systems   Constitutional: Positive for appetite change, chills and fatigue. Negative for fever.        Decreased oral intake d/t nausea/vomiting; unable to keep food down.    Respiratory: Negative for cough, chest tightness and shortness of breath.    Cardiovascular: Positive for leg swelling. Negative for chest  "pain.        Chronic LE bilaterally   Gastrointestinal: Positive for constipation, nausea and vomiting. Negative for blood in stool and diarrhea.        Patient reports vomiting since hospital discharge; progressively increasing in frequency up to 10 episodes today. Nil relief from phenergan.   Genitourinary: Positive for flank pain. Negative for hematuria.   Neurological: Negative for dizziness, numbness and headaches.   Psychiatric/Behavioral: Positive for confusion.     Physical Exam  Vitals:    02/24/22 1019   BP: 112/76   BP Location: Right arm   Patient Position: Sitting   BP Method: Medium (Manual)   Pulse: 61   SpO2: 97%   Weight: 86.1 kg (189 lb 13.1 oz)   Height: 5' 4" (1.626 m)    Body mass index is 32.58 kg/m².            Physical Exam  Vitals reviewed.   Constitutional:       Appearance: She is obese.      Comments: Patient is in a wheelchair.    HENT:      Head: Normocephalic and atraumatic.   Cardiovascular:      Rate and Rhythm: Normal rate and regular rhythm.      Pulses: Normal pulses.      Heart sounds: Normal heart sounds.   Pulmonary:      Effort: Pulmonary effort is normal. No respiratory distress.      Breath sounds: Normal breath sounds. No wheezing.   Abdominal:      Palpations: Abdomen is soft.      Tenderness: There is abdominal tenderness in the right lower quadrant and left lower quadrant. There is right CVA tenderness and left CVA tenderness.      Comments: Exquisite CVA tenderness R>L; patient retracted from light touch.   Abdominal tenderness is less severe than CVA   Skin:     General: Skin is warm and dry.   Neurological:      General: No focal deficit present.      Mental Status: She is alert.   Psychiatric:         Mood and Affect: Mood normal.         Behavior: Behavior normal.       Health Maintenance       Date Due Completion Date    HIV Screening Never done ---    DEXA Scan Never done ---    Colorectal Cancer Screening 06/02/2021 6/2/2020    Influenza Vaccine (1) 09/01/2021 " 9/29/2020    COVID-19 Vaccine (3 - Booster for Pfizer series) 09/23/2021 4/23/2021    Lipid Panel 01/13/2022 1/13/2021    High Dose Statin 02/03/2023 2/3/2022    Pneumococcal Vaccines (Age 65+) (2 of 2 - PPSV23) 05/17/2023 5/17/2018    TETANUS VACCINE 03/02/2027 3/2/2017            Assessment and Plan:  Tasha Hawley is a 66 y.o. female with a complicated medical history that presents for hospital follow-up with complaints of intractable vomiting and CVA tenderness.    Pyelonephritis  Hx of recurrent UTI with suprapubic catheter. Recent admission for UTI complicated by acute renal failure; discharged 2/17 after 14d admission.     Intractable vomiting with nausea, unspecified vomiting type  10 episodes of vomiting today. Patient reports worsening vomiting since hospital discharge. Decreased oral intake.   -     Refer to Emergency Dept.    Flank pain  Patient demonstrates exquisite tenderness to palpation of the flank/CVA. 3 days of worsening pain that radiates from flank into lower back; rated 9/10.   -     Refer to Emergency Dept.      Nicolette Ruiz, MS4  [facility] [employer]    I hereby acknowledge that I am relying upon documentation authored by a medical student working under my supervision and further I hereby attest that I have verified the student documentation or findings by personally performing the physical exam and medical decision making activities of the Evaluation and Management service to be billed.    rtc after hospitalization, or in a month.    PAPO Hodges MD MPH  Staff Internist

## 2022-02-24 NOTE — ED PROVIDER NOTES
Encounter Date: 2/24/2022       History     Chief Complaint   Patient presents with    Vomiting     Since Thursday; from PCP clinic     66-year-old female with history of hypothyroidism, anemia, HFpEF, CAD presents to the ED complaining of nausea and vomiting.  She was admitted, discharged on 2/17 for CARITO and UTI.  She completed IV cefepime while admitted, was not discharged home on any oral antibiotics.  Suprapubic catheter was exchanged during admission.  She reports chills, fatigue, left lower quadrant abdominal pain for the past 2 days, left flank pain.  She reports decreased urination since yesterday, and dark, cloudy urine this morning.  She has had persistent nausea and vomiting, unable to tolerate solids.  She did try to take Phenergan but states that she was unable to keep it down.  She saw her PCP earlier today, was sent to the ED for further evaluation.  She denies chest pain, shortness of breath, URI symptoms.    The history is provided by the patient.     Review of patient's allergies indicates:   Allergen Reactions    (d)-limonene flavor      Other reaction(s): difficult intubation  Other reaction(s): Difficulty breathing    Bactrim [sulfamethoxazole-trimethoprim] Anaphylaxis    Benadryl [diphenhydramine hcl] Shortness Of Breath    Fentanyl Itching, Nausea And Vomiting and Swelling             Imitrex [sumatriptan succinate] Shortness Of Breath    Topamax [topiramate] Shortness Of Breath    Vancomycin Shortness Of Breath     Rash    Butorphanol tartrate     Darvocet a500 [propoxyphene n-acetaminophen]      Other reaction(s): Difficulty breathing    White petrolatum-zinc     Zinc oxide-white petrolatum      Other reaction(s): Difficulty breathing    Latex, natural rubber Itching and Rash    Phenytoin Rash and Other (See Comments)     Trouble breathing     Past Medical History:   Diagnosis Date    Anticoagulant long-term use     Anxiety     Arthritis     Bilateral lower extremity edema      severe chronic    Carotid artery occlusion     Cataract     CHF (congestive heart failure)     Coronary artery disease     subtotalled LAD with collateral    Depression     Fever blister     Hypothyroid     Iron deficiency anemia     Lumbar radiculopathy     with chronic pain    Ocular migraine     Renal disorder     Sleep apnea     cpap     Past Surgical History:   Procedure Laterality Date    ADENOIDECTOMY      BACK SURGERY      x 12    CARDIAC CATHETERIZATION  2016    subtotalled LAD with right to left collaterals    CATARACT EXTRACTION W/  INTRAOCULAR LENS IMPLANT Left     Dr Coleman     CYSTOSCOPIC LITHOLAPAXY N/A 6/27/2019    Procedure: CYSTOLITHOLAPAXY;  Surgeon: Shireen Mayo MD;  Location: [facility] OR 2ND FLR;  Service: Urology;  Laterality: N/A;    CYSTOSCOPIC LITHOLAPAXY N/A 9/3/2019    Procedure: CYSTOLITHOLAPAXY;  Surgeon: Shireen Mayo MD;  Location: [facility] OR 2ND FLR;  Service: Urology;  Laterality: N/A;    CYSTOSCOPY N/A 7/13/2021    Procedure: CYSTOSCOPY;  Surgeon: Shireen Mayo MD;  Location: [facility] OR 1ST FLR;  Service: Urology;  Laterality: N/A;    CYSTOSCOPY  11/16/2021    Procedure: CYSTOSCOPY;  Surgeon: Shireen Mayo MD;  Location: [facility] OR 1ST FLR;  Service: Urology;;    ESOPHAGOGASTRODUODENOSCOPY N/A 5/23/2018    Procedure: ESOPHAGOGASTRODUODENOSCOPY (EGD);  Surgeon: Prince Vance MD;  Location: [facility] (4TH FLR);  Service: Endoscopy;  Laterality: N/A;  r/s 'd per Dr. Vance due to family emergency- ER    HYSTERECTOMY  1975    endometriosis    INJECTION OF BOTULINUM TOXIN TYPE A  7/13/2021    Procedure: INJECTION, BOTULINUM TOXIN, 200units;  Surgeon: Shireen Mayo MD;  Location: [facility] OR 1ST FLR;  Service: Urology;;    INJECTION OF BOTULINUM TOXIN TYPE A  11/16/2021    Procedure: INJECTION, BOTULINUM TOXIN, 200units;  Surgeon: Shireen Mayo MD;  Location: [facility] OR 1ST FLR;  Service: Urology;;    pain pump placement      SQ Dilaudid Pump  managed by Dr. Hillman, Pain Management    REPLACEMENT OF CATHETER N/A 10/31/2019    Procedure: REPLACEMENT, CATHETER-SUPRAPUBIC;  Surgeon: Shireen Mayo MD;  Location: [facility] OR [address] [place] [address];  Service: Urology;  Laterality: N/A;    SPINAL CORD STIMULATOR REMOVAL      before [place]    SPINE SURGERY  5-13-13    CERVICAL FUSION    TONSILLECTOMY       Family History   Problem Relation Age of Onset    Cancer Mother 55        breast    Cancer Father         esophagus,had laryngectomy    Esophageal cancer Father     Parkinsonism Maternal Grandmother     Tremor Maternal Grandmother     No Known Problems Brother     No Known Problems Brother     Heart disease Maternal Uncle     Colon cancer Maternal Uncle         Less than 60    No Known Problems Sister     No Known Problems Maternal Aunt     Cirrhosis Paternal Aunt         ETOH    Liver disease Paternal Aunt         ETOH    Liver disease Paternal Uncle         ETOH    Cirrhosis Paternal Uncle         ETOH    No Known Problems Maternal Grandfather     No Known Problems Paternal Grandmother     No Known Problems Paternal Grandfather     Melanoma Neg Hx     Amblyopia Neg Hx     Blindness Neg Hx     Cataracts Neg Hx     Diabetes Neg Hx     Glaucoma Neg Hx     Hypertension Neg Hx     Macular degeneration Neg Hx     Retinal detachment Neg Hx     Strabismus Neg Hx     Stroke Neg Hx     Thyroid disease Neg Hx     Celiac disease Neg Hx     Colon polyps Neg Hx     Cystic fibrosis Neg Hx     Crohn's disease Neg Hx     Inflammatory bowel disease Neg Hx     Liver cancer Neg Hx     Rectal cancer Neg Hx     Stomach cancer Neg Hx     Ulcerative colitis Neg Hx     Lymphoma Neg Hx      Social History     Tobacco Use    Smoking status: Never Smoker    Smokeless tobacco: Never Used   Substance Use Topics    Alcohol use: Never    Drug use: No     Review of Systems   Constitutional: Positive for chills and fatigue. Negative for fever.   HENT:  Negative for congestion and sore throat.    Eyes: Negative for photophobia and visual disturbance.   Respiratory: Negative for cough and shortness of breath.    Cardiovascular: Positive for leg swelling.   Gastrointestinal: Positive for abdominal pain, nausea and vomiting. Negative for constipation and diarrhea.   Genitourinary: Positive for decreased urine volume and flank pain.        +dark cloudy urine today   Musculoskeletal: Positive for back pain.   Skin: Negative for rash and wound.   Neurological: Positive for light-headedness and headaches. Negative for syncope.   Psychiatric/Behavioral: Negative for confusion.       Physical Exam     Initial Vitals [02/24/22 1123]   BP Pulse Resp Temp SpO2   108/70 (!) 59 18 97.9 °F (36.6 °C) 99 %      MAP       --         Physical Exam    Nursing note and vitals reviewed.  Constitutional: She appears well-developed and well-nourished. She is not diaphoretic. No distress.   HENT:   Head: Normocephalic and atraumatic.   Neck: Neck supple.   Normal range of motion.  Cardiovascular: Normal rate, regular rhythm and normal heart sounds. Exam reveals no gallop and no friction rub.    No murmur heard.  Trace LE edema   Pulmonary/Chest: Breath sounds normal. She has no wheezes. She has no rhonchi. She has no rales.   Abdominal: Abdomen is soft. Bowel sounds are normal. There is no abdominal tenderness.   Suprapubic catheter noted. Tender to LLQ There is no rebound and no guarding.   Musculoskeletal:         General: Normal range of motion.      Cervical back: Normal range of motion and neck supple.     Neurological: She is alert and oriented to person, place, and time.   Skin: Skin is warm and dry. No rash noted. No erythema.   Psychiatric: She has a normal mood and affect.         ED Course   Procedures  Labs Reviewed   CBC W/ AUTO DIFFERENTIAL - Abnormal; Notable for the following components:       Result Value    RBC 3.96 (*)     Hemoglobin 11.4 (*)     Hematocrit 35.2 (*)      RDW 14.8 (*)     All other components within normal limits   COMPREHENSIVE METABOLIC PANEL - Abnormal; Notable for the following components:    Sodium 135 (*)     Chloride 93 (*)     ALT 9 (*)     eGFR if  45.5 (*)     eGFR if non  39.5 (*)     All other components within normal limits   URINALYSIS, REFLEX TO URINE CULTURE - Abnormal; Notable for the following components:    Appearance, UA Hazy (*)     Occult Blood UA 2+ (*)     Leukocytes, UA 2+ (*)     All other components within normal limits    Narrative:     Specimen Source->Urine   URINALYSIS MICROSCOPIC - Abnormal; Notable for the following components:    Yeast, UA Many (*)     All other components within normal limits    Narrative:     Specimen Source->Urine   CULTURE, BLOOD   CULTURE, BLOOD   LACTIC ACID, PLASMA          Imaging Results    None          Medications   ondansetron injection 4 mg (4 mg Intravenous Given 2/24/22 1311)   sodium chloride 0.9% bolus 500 mL (0 mLs Intravenous Stopped 2/24/22 1502)     Medical Decision Making:   History:   Old Medical Records: I decided to obtain old medical records.  Old Records Summarized: records from clinic visits and records from previous admission(s).       <> Summary of Records: Admitted 2/3-2/7 to  for CARITO and UTI. Cr 7.2 at admit. Urine culture grew Candida. Suprapubic catheter exchanged 2/4. Received IV cefepime    Saw PCP today - sent to the ED for eval.   Clinical Tests:   Lab Tests: Ordered and Reviewed       APC / Resident Notes:   66-year-old female with history of hypothyroidism, anemia, HFpEF, CAD presents to the ED complaining of nausea and vomiting.  Vital signs stable.  Regular rate and rhythm.  Lungs are clear.  Suprapubic catheter noted.  Abdomen is tender in left lower quadrant.  Trace lower extremity edema.  Alert and oriented.  Differential diagnosis includes but is not limited to UTI, pyelonephritis, sepsis, CARITO, electrolyte abnormalities.  Will check labs,  give Zofran reassess.    UA with no acute infection. No leukocytosis. CMP with no acute abnormalities. Lactic acid normal. Blood culture x 2 pending.     Improved with zofran. Tolerated po challenge. States she is feeling better and ready to be discharged.      I do not feel that she needs any further labs or imaging at this time. Stable for discharge.     She was discharged with a prescription for zofran.  She will follow up with her PCP.  Strict ED return precautions given.  All of the patient's questions were answered.  I reviewed the patient's chart and labs and discussed the case with my supervising physician.                    Clinical Impression:   Final diagnoses:  [R11.2] Non-intractable vomiting with nausea, unspecified vomiting type (Primary)          ED Disposition Condition    Discharge Stable        ED Prescriptions     Medication Sig Dispense Start Date End Date Auth. Provider    ondansetron (ZOFRAN-ODT) 4 MG TbDL  (Status: Discontinued) Take 1 tablet (4 mg total) by mouth every 8 (eight) hours as needed (nausea). 12 tablet 2/24/2022 2/24/2022 Ana Malik PA-C    ondansetron (ZOFRAN-ODT) 4 MG TbDL Take 1 tablet (4 mg total) by mouth every 8 (eight) hours as needed (nausea). 12 tablet 2/24/2022  Ana Malik PA-C        Follow-up Information     Follow up With Specialties Details Why Contact Info    Mesfin Hodges II, MD Internal Medicine Schedule an appointment as soon as possible for a visit   [address]  [place] [address]  [phone]             Ana Malik PA-C  02/24/22 2020

## 2022-03-01 LAB
BACTERIA BLD CULT: NORMAL
BACTERIA BLD CULT: NORMAL

## 2022-03-02 NOTE — PROGRESS NOTES
Patient, Tasha Hawley (MRN #[id number]), presented with a recent Estimated Glumerular Filtration Rate (EGFR) between 30 and 45 consistent with the definition of chronic kidney disease stage 3 - moderate (ICD10 - N18.3).    eGFR if non    Date Value Ref Range Status   02/24/2022 39.5 (A) >60 mL/min/1.73 m^2 Final     Comment:     Calculation used to obtain the estimated glomerular filtration  rate (eGFR) is the CKD-EPI equation.          The patient's chronic kidney disease stage 3 was monitored, evaluated, addressed and/or treated. This addendum to the medical record is made on 03/02/2022.

## 2022-03-10 ENCOUNTER — EXTERNAL HOME HEALTH (OUTPATIENT)
Dept: HOME HEALTH SERVICES | Facility: HOSPITAL | Age: 67
End: 2022-03-10
Payer: MEDICARE

## 2022-03-15 ENCOUNTER — DOCUMENT SCAN (OUTPATIENT)
Dept: HOME HEALTH SERVICES | Facility: HOSPITAL | Age: 67
End: 2022-03-15
Payer: MEDICARE

## 2022-03-21 NOTE — TELEPHONE ENCOUNTER
Care Due:                  Date            Visit Type   Department     Provider  --------------------------------------------------------------------------------                                HOSPITAL     [facility] INTERNAL  Last Visit: 02-      FOLLOW UP    MEDICINE       Mesfin Hodges                              EP -                              PRIMARY      [facility] INTER[facility]  Next Visit: 04-      CARE ([facility])   MEDICINE       Mesfin Hodges                                                            Last  Test          Frequency    Reason                     Performed    Due Date  --------------------------------------------------------------------------------    Lipid Panel.  12 months..  atorvastatin.............  01- 01-    Powered by [employer]. Reference number: [id number].   3/21/2022 4:57:32 PM CDT

## 2022-03-22 RX ORDER — TORSEMIDE 100 MG/1
TABLET ORAL
Qty: 90 TABLET | Refills: 0 | Status: SHIPPED | OUTPATIENT
Start: 2022-03-22 | End: 2022-04-22

## 2022-03-22 RX ORDER — POTASSIUM CHLORIDE 750 MG/1
CAPSULE, EXTENDED RELEASE ORAL
Qty: 180 CAPSULE | Refills: 0 | Status: ON HOLD | OUTPATIENT
Start: 2022-03-22 | End: 2022-04-22 | Stop reason: HOSPADM

## 2022-03-28 ENCOUNTER — TELEPHONE (OUTPATIENT)
Dept: NEPHROLOGY | Facility: CLINIC | Age: 67
End: 2022-03-28
Payer: MEDICARE

## 2022-03-28 DIAGNOSIS — N17.0 ACUTE RENAL FAILURE WITH TUBULAR NECROSIS: Primary | ICD-10-CM

## 2022-03-30 ENCOUNTER — PATIENT OUTREACH (OUTPATIENT)
Dept: ADMINISTRATIVE | Facility: OTHER | Age: 67
End: 2022-03-30
Payer: MEDICARE

## 2022-03-30 NOTE — PROGRESS NOTES
Health Maintenance Due   Topic Date Due    HIV Screening  Never done    DEXA Scan  Never done    Colorectal Cancer Screening  06/02/2021    Influenza Vaccine (1) 09/01/2021    COVID-19 Vaccine (3 - Booster for Pfizer series) 09/23/2021    Lipid Panel  01/13/2022     Updates were requested from care everywhere.  Chart was reviewed for overdue Proactive [facility] Encounters ([facility]) topics (CRS, Breast Cancer Screening, Eye exam)  Health Maintenance has been updated.  LINKS immunization registry triggered.  Immunizations were reconciled.

## 2022-03-31 ENCOUNTER — OFFICE VISIT (OUTPATIENT)
Dept: NEPHROLOGY | Facility: CLINIC | Age: 67
End: 2022-03-31
Payer: MEDICARE

## 2022-03-31 VITALS
DIASTOLIC BLOOD PRESSURE: 58 MMHG | HEART RATE: 63 BPM | SYSTOLIC BLOOD PRESSURE: 100 MMHG | WEIGHT: 191.81 LBS | OXYGEN SATURATION: 95 % | HEIGHT: 64 IN | BODY MASS INDEX: 32.74 KG/M2

## 2022-03-31 DIAGNOSIS — N18.31 STAGE 3A CHRONIC KIDNEY DISEASE: Primary | ICD-10-CM

## 2022-03-31 PROCEDURE — 3008F PR BODY MASS INDEX (BMI) DOCUMENTED: ICD-10-PCS | Mod: CPTII,S$GLB,, | Performed by: INTERNAL MEDICINE

## 2022-03-31 PROCEDURE — 1100F PR PT FALLS ASSESS DOC 2+ FALLS/FALL W/INJURY/YR: ICD-10-PCS | Mod: CPTII,S$GLB,, | Performed by: INTERNAL MEDICINE

## 2022-03-31 PROCEDURE — 3078F DIAST BP <80 MM HG: CPT | Mod: CPTII,S$GLB,, | Performed by: INTERNAL MEDICINE

## 2022-03-31 PROCEDURE — 99204 OFFICE O/P NEW MOD 45 MIN: CPT | Mod: S$GLB,,, | Performed by: INTERNAL MEDICINE

## 2022-03-31 PROCEDURE — 99999 PR PBB SHADOW E&M-EST. PATIENT-LVL IV: ICD-10-PCS | Mod: PBBFAC,,, | Performed by: INTERNAL MEDICINE

## 2022-03-31 PROCEDURE — 99999 PR PBB SHADOW E&M-EST. PATIENT-LVL IV: CPT | Mod: PBBFAC,,, | Performed by: INTERNAL MEDICINE

## 2022-03-31 PROCEDURE — 3066F PR DOCUMENTATION OF TREATMENT FOR NEPHROPATHY: ICD-10-PCS | Mod: CPTII,S$GLB,, | Performed by: INTERNAL MEDICINE

## 2022-03-31 PROCEDURE — 3066F NEPHROPATHY DOC TX: CPT | Mod: CPTII,S$GLB,, | Performed by: INTERNAL MEDICINE

## 2022-03-31 PROCEDURE — 1125F AMNT PAIN NOTED PAIN PRSNT: CPT | Mod: CPTII,S$GLB,, | Performed by: INTERNAL MEDICINE

## 2022-03-31 PROCEDURE — 3078F PR MOST RECENT DIASTOLIC BLOOD PRESSURE < 80 MM HG: ICD-10-PCS | Mod: CPTII,S$GLB,, | Performed by: INTERNAL MEDICINE

## 2022-03-31 PROCEDURE — 99204 PR OFFICE/OUTPT VISIT, NEW, LEVL IV, 45-59 MIN: ICD-10-PCS | Mod: S$GLB,,, | Performed by: INTERNAL MEDICINE

## 2022-03-31 PROCEDURE — 3288F FALL RISK ASSESSMENT DOCD: CPT | Mod: CPTII,S$GLB,, | Performed by: INTERNAL MEDICINE

## 2022-03-31 PROCEDURE — 3288F PR FALLS RISK ASSESSMENT DOCUMENTED: ICD-10-PCS | Mod: CPTII,S$GLB,, | Performed by: INTERNAL MEDICINE

## 2022-03-31 PROCEDURE — 1100F PTFALLS ASSESS-DOCD GE2>/YR: CPT | Mod: CPTII,S$GLB,, | Performed by: INTERNAL MEDICINE

## 2022-03-31 PROCEDURE — 1159F PR MEDICATION LIST DOCUMENTED IN MEDICAL RECORD: ICD-10-PCS | Mod: CPTII,S$GLB,, | Performed by: INTERNAL MEDICINE

## 2022-03-31 PROCEDURE — 3074F PR MOST RECENT SYSTOLIC BLOOD PRESSURE < 130 MM HG: ICD-10-PCS | Mod: CPTII,S$GLB,, | Performed by: INTERNAL MEDICINE

## 2022-03-31 PROCEDURE — 99499 RISK ADDL DX/OHS AUDIT: ICD-10-PCS | Mod: HCNC,S$GLB,, | Performed by: INTERNAL MEDICINE

## 2022-03-31 PROCEDURE — 1159F MED LIST DOCD IN RCRD: CPT | Mod: CPTII,S$GLB,, | Performed by: INTERNAL MEDICINE

## 2022-03-31 PROCEDURE — 99499 UNLISTED E&M SERVICE: CPT | Mod: HCNC,S$GLB,, | Performed by: INTERNAL MEDICINE

## 2022-03-31 PROCEDURE — 3008F BODY MASS INDEX DOCD: CPT | Mod: CPTII,S$GLB,, | Performed by: INTERNAL MEDICINE

## 2022-03-31 PROCEDURE — 3074F SYST BP LT 130 MM HG: CPT | Mod: CPTII,S$GLB,, | Performed by: INTERNAL MEDICINE

## 2022-03-31 PROCEDURE — 1125F PR PAIN SEVERITY QUANTIFIED, PAIN PRESENT: ICD-10-PCS | Mod: CPTII,S$GLB,, | Performed by: INTERNAL MEDICINE

## 2022-03-31 RX ORDER — CIPROFLOXACIN 500 MG/1
750 TABLET ORAL 2 TIMES DAILY
Qty: 21 TABLET | Refills: 0 | Status: SHIPPED | OUTPATIENT
Start: 2022-03-31 | End: 2022-04-05

## 2022-03-31 NOTE — PROGRESS NOTES
REASON FOR CONSULT: CKD    REFERRING PHYSICIAN: Mesfin Hodges Ii, MD      HISTORY OF PRESENT ILLNESS: 66 y.o. female who is new to me  has a past medical history of Anticoagulant long-term use, Anxiety, Arthritis, Bilateral lower extremity edema, Carotid artery occlusion, Cataract, CHF (congestive heart failure), Coronary artery disease, Depression, Fever blister, Hypothyroid, Iron deficiency anemia, Lumbar radiculopathy, Ocular migraine, Renal disorder, and Sleep apnea. patient was referred here for abnormal renal function . S/p CARITO 2/2022  The patient denies taking NSAIDs , recreational drugs, recent episode of dehydration, diarrhea, nausea or vomiting,or exposure to IV radiocontrast.     ROS:  General: negative for chills, or fatigue  ENT: No epistaxis or headaches  Hematological and Lymphatic: No bleeding problems or blood clots.  Endocrine: No skin changes or temperature intolerance  Respiratory: No cough, shortness of breath, or wheezing  Cardiovascular: No chest pain or dyspnea   Gastrointestinal: No abdominal pain, change in bowel habits  Genito-Urinary: No dysuria, trouble voiding, or hematuria  Musculoskeletal: ROS: negative for - joint pain, joint stiffness, joint swelling, muscle pain or muscular weakness  Neurological: No focal weakness, no numbness  Dermatological: No rash or ulcers.    PAST MEDICAL HISTORY:  Past Medical History:   Diagnosis Date    Anticoagulant long-term use     Anxiety     Arthritis     Bilateral lower extremity edema     severe chronic    Carotid artery occlusion     Cataract     CHF (congestive heart failure)     Coronary artery disease     subtotalled LAD with collateral    Depression     Fever blister     Hypothyroid     Iron deficiency anemia     Lumbar radiculopathy     with chronic pain    Ocular migraine     Renal disorder     Sleep apnea     cpap       PAST SURGICAL HISTORY:  Past Surgical History:   Procedure Laterality Date    ADENOIDECTOMY       BACK SURGERY      x 12    CARDIAC CATHETERIZATION  2016    subtotalled LAD with right to left collaterals    CATARACT EXTRACTION W/  INTRAOCULAR LENS IMPLANT Left     Dr Coleman     CYSTOSCOPIC LITHOLAPAXY N/A 6/27/2019    Procedure: CYSTOLITHOLAPAXY;  Surgeon: Shireen Mayo MD;  Location: [facility] OR 2ND FLR;  Service: Urology;  Laterality: N/A;    CYSTOSCOPIC LITHOLAPAXY N/A 9/3/2019    Procedure: CYSTOLITHOLAPAXY;  Surgeon: Shireen Mayo MD;  Location: [facility] OR 2ND FLR;  Service: Urology;  Laterality: N/A;    CYSTOSCOPY N/A 7/13/2021    Procedure: CYSTOSCOPY;  Surgeon: Shireen Mayo MD;  Location: [facility] OR 1ST FLR;  Service: Urology;  Laterality: N/A;    CYSTOSCOPY  11/16/2021    Procedure: CYSTOSCOPY;  Surgeon: Shireen Mayo MD;  Location: [facility] OR 1ST FLR;  Service: Urology;;    ESOPHAGOGASTRODUODENOSCOPY N/A 5/23/2018    Procedure: ESOPHAGOGASTRODUODENOSCOPY (EGD);  Surgeon: Prince Vance MD;  Location: [facility] (4TH FLR);  Service: Endoscopy;  Laterality: N/A;  r/s 'd per Dr. aVnce due to family emergency- ER    HYSTERECTOMY  1975    endometriosis    INJECTION OF BOTULINUM TOXIN TYPE A  7/13/2021    Procedure: INJECTION, BOTULINUM TOXIN, 200units;  Surgeon: Shireen Mayo MD;  Location: [facility] OR 1ST FLR;  Service: Urology;;    INJECTION OF BOTULINUM TOXIN TYPE A  11/16/2021    Procedure: INJECTION, BOTULINUM TOXIN, 200units;  Surgeon: Shireen Mayo MD;  Location: [facility] OR 1ST FLR;  Service: Urology;;    pain pump placement      SQ Dilaudid Pump managed by Dr. Hillman, Pain Management    REPLACEMENT OF CATHETER N/A 10/31/2019    Procedure: REPLACEMENT, CATHETER-SUPRAPUBIC;  Surgeon: Shireen Mayo MD;  Location: [facility] OR 1ST FLR;  Service: Urology;  Laterality: N/A;    SPINAL CORD STIMULATOR REMOVAL      before [place]    SPINE SURGERY  5-13-13    CERVICAL FUSION    TONSILLECTOMY         FAMILY HISTORY:   Family History   Problem Relation Age of Onset    Cancer Mother 55         breast    Cancer Father         esophagus,had laryngectomy    Esophageal cancer Father     Parkinsonism Maternal Grandmother     Tremor Maternal Grandmother     No Known Problems Brother     No Known Problems Brother     Heart disease Maternal Uncle     Colon cancer Maternal Uncle         Less than 60    No Known Problems Sister     No Known Problems Maternal Aunt     Cirrhosis Paternal Aunt         ETOH    Liver disease Paternal Aunt         ETOH    Liver disease Paternal Uncle         ETOH    Cirrhosis Paternal Uncle         ETOH    No Known Problems Maternal Grandfather     No Known Problems Paternal Grandmother     No Known Problems Paternal Grandfather     Melanoma Neg Hx     Amblyopia Neg Hx     Blindness Neg Hx     Cataracts Neg Hx     Diabetes Neg Hx     Glaucoma Neg Hx     Hypertension Neg Hx     Macular degeneration Neg Hx     Retinal detachment Neg Hx     Strabismus Neg Hx     Stroke Neg Hx     Thyroid disease Neg Hx     Celiac disease Neg Hx     Colon polyps Neg Hx     Cystic fibrosis Neg Hx     Crohn's disease Neg Hx     Inflammatory bowel disease Neg Hx     Liver cancer Neg Hx     Rectal cancer Neg Hx     Stomach cancer Neg Hx     Ulcerative colitis Neg Hx     Lymphoma Neg Hx        SOCIAL HISTORY:  Social History     Socioeconomic History    Marital status:    Tobacco Use    Smoking status: Never Smoker    Smokeless tobacco: Never Used   Substance and Sexual Activity    Alcohol use: Never    Drug use: No    Sexual activity: Never     Partners: Male     Social Determinants of Health     Financial Resource Strain: Low Risk     Difficulty of Paying Living Expenses: Not very hard   Food Insecurity: No Food Insecurity    Worried About Running Out of Food in the Last Year: Never true    Ran Out of Food in the Last Year: Never true   Transportation Needs: No Transportation Needs    Lack of Transportation (Medical): No    Lack of Transportation  (Non-Medical): No   Physical Activity: Inactive    Days of Exercise per Week: 0 days    Minutes of Exercise per Session: 0 min   Housing Stability: Low Risk     Unable to Pay for Housing in the Last Year: No    Number of Places Lived in the Last Year: 1    Unstable Housing in the Last Year: No       ALLERGIES:  Review of patient's allergies indicates:   Allergen Reactions    (d)-limonene flavor      Other reaction(s): difficult intubation  Other reaction(s): Difficulty breathing    Bactrim [sulfamethoxazole-trimethoprim] Anaphylaxis    Benadryl [diphenhydramine hcl] Shortness Of Breath    Fentanyl Itching, Nausea And Vomiting and Swelling             Imitrex [sumatriptan succinate] Shortness Of Breath    Topamax [topiramate] Shortness Of Breath    Vancomycin Shortness Of Breath     Rash    Butorphanol tartrate     Darvocet a500 [propoxyphene n-acetaminophen]      Other reaction(s): Difficulty breathing    White petrolatum-zinc     Zinc oxide-white petrolatum      Other reaction(s): Difficulty breathing    Latex, natural rubber Itching and Rash    Phenytoin Rash and Other (See Comments)     Trouble breathing       MEDICATIONS:    Current Outpatient Medications:     aspirin (ECOTRIN) 81 MG EC tablet, Take 1 tablet (81 mg total) by mouth once daily., Disp: , Rfl: 0    atorvastatin (LIPITOR) 80 MG tablet, TAKE ONE TABLET BY MOUTH EVERY DAY, Disp: 90 tablet, Rfl: 3    butalbital-acetaminophen-caffeine -40 mg (FIORICET, ESGIC) -40 mg per tablet, Take 1 tablet by mouth daily as needed., Disp: 15 tablet, Rfl: 0    FLUoxetine 20 MG capsule, Take 3 capsules (60 mg total) by mouth once daily., Disp: 270 capsule, Rfl: 1    intrathecal pain pump compound, Hydromorphone (7.5 mg/mL) infusion at 3.6799 mg/day (0.1533 mg/hr) out of a total reservoir volume of 37.3 mL Pump filled every 2 months, Disp: , Rfl:     levothyroxine (SYNTHROID) 125 MCG tablet, Take 1 tablet (125 mcg total) by mouth before  breakfast., Disp: 90 tablet, Rfl: 3    lidocaine (LIDODERM) 5 %, daily as needed. , Disp: , Rfl:     nitroGLYCERIN (NITROSTAT) 0.4 MG SL tablet, Place 1 tablet (0.4 mg total) under the tongue every 5 (five) minutes as needed for Chest pain., Disp: 25 tablet, Rfl: 3    ondansetron (ZOFRAN-ODT) 4 MG TbDL, Take 1 tablet (4 mg total) by mouth every 8 (eight) hours as needed (nausea)., Disp: 12 tablet, Rfl: 0    oxybutynin (DITROPAN XL) 15 MG TR24, Take 1 tablet (15 mg total) by mouth once daily., Disp: 90 tablet, Rfl: 3    pantoprazole (PROTONIX) 40 MG tablet, TAKE ONE TABLET BY MOUTH EVERY DAY, Disp: 90 tablet, Rfl: 3    potassium chloride (MICRO-K) 10 MEQ CpSR, TAKE TWO CAPSULES BY MOUTH EVERY DAY, Disp: 180 capsule, Rfl: 0    promethazine (PHENERGAN) 25 MG tablet, Take 25 mg by mouth every 6 (six) hours as needed for Nausea., Disp: , Rfl:     QUEtiapine (SEROQUEL) 100 MG Tab, Take 2 tablets (200 mg total) by mouth nightly., Disp: 180 tablet, Rfl: 3    QUEtiapine (SEROQUEL) 25 MG Tab, Take 12.5-25 mg twice daily as needed for anxiety, Disp: 180 tablet, Rfl: 1    senna (SENNA LAX) 8.6 mg tablet, Take 2 tablets by mouth 2 (two) times daily., Disp: , Rfl:     torsemide (DEMADEX) 100 MG Tab, TAKE ONE TABLET BY MOUTH EVERY DAY, Disp: 90 tablet, Rfl: 0    traZODone (DESYREL) 100 MG tablet, Take 3 tablets (300 mg total) by mouth every evening., Disp: 270 tablet, Rfl: 1    valACYclovir (VALTREX) 1000 MG tablet, Take 1 tablet (1,000 mg total) by mouth 2 (two) times daily. for 4 days, Disp: 8 tablet, Rfl: 0   Medication List with Changes/Refills   Current Medications    ASPIRIN (ECOTRIN) 81 MG EC TABLET    Take 1 tablet (81 mg total) by mouth once daily.    ATORVASTATIN (LIPITOR) 80 MG TABLET    TAKE ONE TABLET BY MOUTH EVERY DAY    BUTALBITAL-ACETAMINOPHEN-CAFFEINE -40 MG (FIORICET, ESGIC) -40 MG PER TABLET    Take 1 tablet by mouth daily as needed.    FLUOXETINE 20 MG CAPSULE    Take 3 capsules (60 mg  total) by mouth once daily.    INTRATHECAL PAIN PUMP COMPOUND    Hydromorphone (7.5 mg/mL) infusion at 3.6799 mg/day (0.1533 mg/hr) out of a total reservoir volume of 37.3 mL  Pump filled every 2 months    LEVOTHYROXINE (SYNTHROID) 125 MCG TABLET    Take 1 tablet (125 mcg total) by mouth before breakfast.    LIDOCAINE (LIDODERM) 5 %    daily as needed.     NITROGLYCERIN (NITROSTAT) 0.4 MG SL TABLET    Place 1 tablet (0.4 mg total) under the tongue every 5 (five) minutes as needed for Chest pain.    ONDANSETRON (ZOFRAN-ODT) 4 MG TBDL    Take 1 tablet (4 mg total) by mouth every 8 (eight) hours as needed (nausea).    OXYBUTYNIN (DITROPAN XL) 15 MG TR24    Take 1 tablet (15 mg total) by mouth once daily.    PANTOPRAZOLE (PROTONIX) 40 MG TABLET    TAKE ONE TABLET BY MOUTH EVERY DAY    POTASSIUM CHLORIDE (MICRO-K) 10 MEQ CPSR    TAKE TWO CAPSULES BY MOUTH EVERY DAY    PROMETHAZINE (PHENERGAN) 25 MG TABLET    Take 25 mg by mouth every 6 (six) hours as needed for Nausea.    QUETIAPINE (SEROQUEL) 100 MG TAB    Take 2 tablets (200 mg total) by mouth nightly.    QUETIAPINE (SEROQUEL) 25 MG TAB    Take 12.5-25 mg twice daily as needed for anxiety    SENNA (SENNA LAX) 8.6 MG TABLET    Take 2 tablets by mouth 2 (two) times daily.    TORSEMIDE (DEMADEX) 100 MG TAB    TAKE ONE TABLET BY MOUTH EVERY DAY    TRAZODONE (DESYREL) 100 MG TABLET    Take 3 tablets (300 mg total) by mouth every evening.    VALACYCLOVIR (VALTREX) 1000 MG TABLET    Take 1 tablet (1,000 mg total) by mouth 2 (two) times daily. for 4 days        PHYSICAL EXAM:  LMP  (LMP Unknown)     General: No distress, No fever or chills  Head: Normocephalic,atraumatic  Eyes: conjunctivae/corneas clear. PERRL, EOM's intact.  Nose: Nares normal. Mucosa normal. No drainage or sinus tenderness.  Neck: No adenopathy,no carotid bruit,no JVD  Lungs:Clear to auscultation bilaterally, No Crackles  Heart: Regular rate and rhythm, no murmur, gallops or rubs  Abdomen: Soft, no  tenderness, bowel sounds normal, suprapubic acth in place   Extremities: Atraumatic, no edema in LE  Skin: Turgor normal. No rashes or ulcers  Neurologic: No focal weakness, oriented.          LABS:  Lab Results   Component Value Date    CREATININE 1.13 03/29/2022       Prot/Creat Ratio, Urine   Date Value Ref Range Status   03/29/2022 0.18 0.00 - 0.20 Final       Lab Results   Component Value Date     03/29/2022    K 3.4 (L) 03/29/2022    CO2 31 (H) 03/29/2022       last PTH   Lab Results   Component Value Date    CALCIUM 8.9 03/29/2022    PHOS 3.4 02/17/2022       Lab Results   Component Value Date    HGB 11.2 (L) 03/29/2022        Lab Results   Component Value Date    HGBA1C 5.0 07/07/2021       Lab Results   Component Value Date    LDLCALC 99.6 01/13/2021           ASSESSMENT:  1- CKD IIIa  - s/p CARITO as below  - currently at baseline scr on 3/29 was 1.2 and GFR 51  -Avoid NSAIDs intake    2- s/p admission 2/2022 for 14 days for pyelonephritis and CARITO 2/2 ATN   - her scr on admission was 7.2    3-neurogenic bladder with suprapubic catheter and recurrent UTI   - UA from 3/29 is showing UTI  - will treat her with cipro for 7 days           RTC in 1yr      Thanks for allowing me to participate in the care of this patient.     9:37 AM    ANTOINE MEREDITH MD  NEPHROLOGY ATTENDING

## 2022-04-04 RX ORDER — VALACYCLOVIR HYDROCHLORIDE 1 G/1
1000 TABLET, FILM COATED ORAL 2 TIMES DAILY
Qty: 8 TABLET | Refills: 0 | Status: SHIPPED | OUTPATIENT
Start: 2022-04-04 | End: 2022-04-05

## 2022-04-04 RX ORDER — VALACYCLOVIR HYDROCHLORIDE 1 G/1
1000 TABLET, FILM COATED ORAL 2 TIMES DAILY
Qty: 8 TABLET | Refills: 0 | Status: CANCELLED | OUTPATIENT
Start: 2022-04-04 | End: 2022-04-08

## 2022-04-04 NOTE — TELEPHONE ENCOUNTER
Spoke with pt's spouse and he stated that pt still has shingles outbreak and needs Valtrex rx renewed. RX was given in the ED February 24th

## 2022-04-04 NOTE — TELEPHONE ENCOUNTER
Care Due:                  Date            Visit Type   Department     Provider  --------------------------------------------------------------------------------                                HOSPITAL     [facility] INTERNAL  Last Visit: 02-      FOLLOW UP    MEDICINE       Mesfin Hodges                              EP -                              PRIMARY      [facility] INTERNAL  Next Visit: 04-      CARE ([facility])   MEDICINE       Mesfin Hodges                                                            Last  Test          Frequency    Reason                     Performed    Due Date  --------------------------------------------------------------------------------    TSH.........  12 months..  levothyroxine............  07- 07-    Powered by [employer]. Reference number: [id number].   4/04/2022 2:15:08 PM CDT

## 2022-04-04 NOTE — TELEPHONE ENCOUNTER
----- Message from Malcolm Magdaleno sent at 4/4/2022  1:51 PM CDT -----  Contact: pt   Pt calling to speak with Dr Hodges regarding shingles.Please advise

## 2022-04-05 ENCOUNTER — LAB VISIT (OUTPATIENT)
Dept: LAB | Facility: HOSPITAL | Age: 67
End: 2022-04-05
Attending: INTERNAL MEDICINE
Payer: MEDICARE

## 2022-04-05 ENCOUNTER — IMMUNIZATION (OUTPATIENT)
Dept: INTERNAL MEDICINE | Facility: CLINIC | Age: 67
End: 2022-04-05
Payer: MEDICARE

## 2022-04-05 ENCOUNTER — OUTPATIENT CASE MANAGEMENT (OUTPATIENT)
Dept: ADMINISTRATIVE | Facility: OTHER | Age: 67
End: 2022-04-05
Payer: MEDICARE

## 2022-04-05 ENCOUNTER — OFFICE VISIT (OUTPATIENT)
Dept: INTERNAL MEDICINE | Facility: CLINIC | Age: 67
End: 2022-04-05
Payer: MEDICARE

## 2022-04-05 VITALS
SYSTOLIC BLOOD PRESSURE: 110 MMHG | WEIGHT: 194.44 LBS | HEART RATE: 62 BPM | OXYGEN SATURATION: 98 % | DIASTOLIC BLOOD PRESSURE: 70 MMHG | BODY MASS INDEX: 33.2 KG/M2 | HEIGHT: 64 IN

## 2022-04-05 DIAGNOSIS — N31.9 NEUROGENIC BLADDER: Chronic | ICD-10-CM

## 2022-04-05 DIAGNOSIS — R79.9 ABNORMAL FINDING OF BLOOD CHEMISTRY, UNSPECIFIED: ICD-10-CM

## 2022-04-05 DIAGNOSIS — G47.01 INSOMNIA DUE TO MEDICAL CONDITION: Chronic | ICD-10-CM

## 2022-04-05 DIAGNOSIS — N39.0 RECURRENT UTI (URINARY TRACT INFECTION): ICD-10-CM

## 2022-04-05 DIAGNOSIS — F11.20 NARCOTIC DEPENDENCY, CONTINUOUS: Chronic | ICD-10-CM

## 2022-04-05 DIAGNOSIS — Z11.59 SCREENING FOR VIRAL DISEASE: ICD-10-CM

## 2022-04-05 DIAGNOSIS — Z23 NEED FOR VACCINATION: Primary | ICD-10-CM

## 2022-04-05 DIAGNOSIS — Z74.09 IMPAIRED FUNCTIONAL MOBILITY AND ACTIVITY TOLERANCE: ICD-10-CM

## 2022-04-05 DIAGNOSIS — I99.9 VASCULAR DISEASE: ICD-10-CM

## 2022-04-05 DIAGNOSIS — G89.4 CHRONIC PAIN SYNDROME: Primary | Chronic | ICD-10-CM

## 2022-04-05 DIAGNOSIS — Z93.59 SUPRAPUBIC CATHETER: Chronic | ICD-10-CM

## 2022-04-05 LAB
ALBUMIN SERPL BCP-MCNC: 3.9 G/DL (ref 3.5–5.2)
ALP SERPL-CCNC: 102 U/L (ref 55–135)
ALT SERPL W/O P-5'-P-CCNC: 14 U/L (ref 10–44)
ANION GAP SERPL CALC-SCNC: 12 MMOL/L (ref 8–16)
AST SERPL-CCNC: 19 U/L (ref 10–40)
BILIRUB SERPL-MCNC: 0.6 MG/DL (ref 0.1–1)
BUN SERPL-MCNC: 18 MG/DL (ref 8–23)
CALCIUM SERPL-MCNC: 9.1 MG/DL (ref 8.7–10.5)
CHLORIDE SERPL-SCNC: 89 MMOL/L (ref 95–110)
CHOLEST SERPL-MCNC: 141 MG/DL (ref 120–199)
CHOLEST/HDLC SERPL: 2.7 {RATIO} (ref 2–5)
CO2 SERPL-SCNC: 27 MMOL/L (ref 23–29)
CREAT SERPL-MCNC: 5.9 MG/DL (ref 0.5–1.4)
EST. GFR  (AFRICAN AMERICAN): 8 ML/MIN/1.73 M^2
EST. GFR  (NON AFRICAN AMERICAN): 6.9 ML/MIN/1.73 M^2
GLUCOSE SERPL-MCNC: 94 MG/DL (ref 70–110)
HDLC SERPL-MCNC: 52 MG/DL (ref 40–75)
HDLC SERPL: 36.9 % (ref 20–50)
LDLC SERPL CALC-MCNC: 75.8 MG/DL (ref 63–159)
NONHDLC SERPL-MCNC: 89 MG/DL
POTASSIUM SERPL-SCNC: 3.8 MMOL/L (ref 3.5–5.1)
PROT SERPL-MCNC: 7.2 G/DL (ref 6–8.4)
SODIUM SERPL-SCNC: 128 MMOL/L (ref 136–145)
TRIGL SERPL-MCNC: 66 MG/DL (ref 30–150)

## 2022-04-05 PROCEDURE — 3066F PR DOCUMENTATION OF TREATMENT FOR NEPHROPATHY: ICD-10-PCS | Mod: CPTII,S$GLB,, | Performed by: INTERNAL MEDICINE

## 2022-04-05 PROCEDURE — 99999 PR PBB SHADOW E&M-EST. PATIENT-LVL IV: CPT | Mod: PBBFAC,,, | Performed by: INTERNAL MEDICINE

## 2022-04-05 PROCEDURE — 3074F SYST BP LT 130 MM HG: CPT | Mod: CPTII,S$GLB,, | Performed by: INTERNAL MEDICINE

## 2022-04-05 PROCEDURE — 1159F PR MEDICATION LIST DOCUMENTED IN MEDICAL RECORD: ICD-10-PCS | Mod: CPTII,S$GLB,, | Performed by: INTERNAL MEDICINE

## 2022-04-05 PROCEDURE — 3288F FALL RISK ASSESSMENT DOCD: CPT | Mod: CPTII,S$GLB,, | Performed by: INTERNAL MEDICINE

## 2022-04-05 PROCEDURE — 3008F PR BODY MASS INDEX (BMI) DOCUMENTED: ICD-10-PCS | Mod: CPTII,S$GLB,, | Performed by: INTERNAL MEDICINE

## 2022-04-05 PROCEDURE — 3078F PR MOST RECENT DIASTOLIC BLOOD PRESSURE < 80 MM HG: ICD-10-PCS | Mod: CPTII,S$GLB,, | Performed by: INTERNAL MEDICINE

## 2022-04-05 PROCEDURE — 1159F MED LIST DOCD IN RCRD: CPT | Mod: CPTII,S$GLB,, | Performed by: INTERNAL MEDICINE

## 2022-04-05 PROCEDURE — 99999 PR PBB SHADOW E&M-EST. PATIENT-LVL IV: ICD-10-PCS | Mod: PBBFAC,,, | Performed by: INTERNAL MEDICINE

## 2022-04-05 PROCEDURE — 3066F NEPHROPATHY DOC TX: CPT | Mod: CPTII,S$GLB,, | Performed by: INTERNAL MEDICINE

## 2022-04-05 PROCEDURE — 3078F DIAST BP <80 MM HG: CPT | Mod: CPTII,S$GLB,, | Performed by: INTERNAL MEDICINE

## 2022-04-05 PROCEDURE — 1101F PT FALLS ASSESS-DOCD LE1/YR: CPT | Mod: CPTII,S$GLB,, | Performed by: INTERNAL MEDICINE

## 2022-04-05 PROCEDURE — 3288F PR FALLS RISK ASSESSMENT DOCUMENTED: ICD-10-PCS | Mod: CPTII,S$GLB,, | Performed by: INTERNAL MEDICINE

## 2022-04-05 PROCEDURE — 1126F PR PAIN SEVERITY QUANTIFIED, NO PAIN PRESENT: ICD-10-PCS | Mod: CPTII,S$GLB,, | Performed by: INTERNAL MEDICINE

## 2022-04-05 PROCEDURE — 3008F BODY MASS INDEX DOCD: CPT | Mod: CPTII,S$GLB,, | Performed by: INTERNAL MEDICINE

## 2022-04-05 PROCEDURE — 99214 OFFICE O/P EST MOD 30 MIN: CPT | Mod: S$GLB,,, | Performed by: INTERNAL MEDICINE

## 2022-04-05 PROCEDURE — 1126F AMNT PAIN NOTED NONE PRSNT: CPT | Mod: CPTII,S$GLB,, | Performed by: INTERNAL MEDICINE

## 2022-04-05 PROCEDURE — 36415 COLL VENOUS BLD VENIPUNCTURE: CPT | Performed by: INTERNAL MEDICINE

## 2022-04-05 PROCEDURE — 99214 PR OFFICE/OUTPT VISIT, EST, LEVL IV, 30-39 MIN: ICD-10-PCS | Mod: S$GLB,,, | Performed by: INTERNAL MEDICINE

## 2022-04-05 PROCEDURE — 80053 COMPREHEN METABOLIC PANEL: CPT | Performed by: INTERNAL MEDICINE

## 2022-04-05 PROCEDURE — 3074F PR MOST RECENT SYSTOLIC BLOOD PRESSURE < 130 MM HG: ICD-10-PCS | Mod: CPTII,S$GLB,, | Performed by: INTERNAL MEDICINE

## 2022-04-05 PROCEDURE — 80061 LIPID PANEL: CPT | Performed by: INTERNAL MEDICINE

## 2022-04-05 PROCEDURE — 1101F PR PT FALLS ASSESS DOC 0-1 FALLS W/OUT INJ PAST YR: ICD-10-PCS | Mod: CPTII,S$GLB,, | Performed by: INTERNAL MEDICINE

## 2022-04-05 PROCEDURE — 1160F PR REVIEW ALL MEDS BY PRESCRIBER/CLIN PHARMACIST DOCUMENTED: ICD-10-PCS | Mod: CPTII,S$GLB,, | Performed by: INTERNAL MEDICINE

## 2022-04-05 PROCEDURE — 99499 UNLISTED E&M SERVICE: CPT | Mod: S$GLB,,, | Performed by: INTERNAL MEDICINE

## 2022-04-05 PROCEDURE — 91305 COVID-19, MRNA, LNP-S, PF, 30 MCG/0.3 ML DOSE VACCINE (PFIZER): CPT | Mod: PBBFAC | Performed by: INTERNAL MEDICINE

## 2022-04-05 PROCEDURE — 1160F RVW MEDS BY RX/DR IN RCRD: CPT | Mod: CPTII,S$GLB,, | Performed by: INTERNAL MEDICINE

## 2022-04-05 PROCEDURE — 87389 HIV-1 AG W/HIV-1&-2 AB AG IA: CPT | Performed by: INTERNAL MEDICINE

## 2022-04-05 PROCEDURE — 99499 RISK ADDL DX/OHS AUDIT: ICD-10-PCS | Mod: S$GLB,,, | Performed by: INTERNAL MEDICINE

## 2022-04-05 NOTE — PROGRESS NOTES
"Subjective:       Patient ID: Tasha Hawley is a 66 y.o. female.    Chief Complaint:   Hospital Follow Up    HPI - I saw her on 2/24 after a UTI hospitalization; transferred to ED at that time for intractable N/V and dehydration.  Seen subsequently by nephro and started on cipro.  She had nausea and vomiting from that and has stopped it after 5 days.  Today, she says she has "shingles" on her back, but has no rash.  She and  continue to state she's not sleeping - none at all in past 3 days, which I doubt.  She feels somewhat dizzy and has difficulty walking.  She has NOT had her covid-19 booster, but is interested in doing that.  She's got excoriations on her hands that she wants me to evaluate.  Not a smoker.    PMH:  Neurogenic bladder, with recurrent UTI's. Followed by urogyn (Milena).  Suprapubic catheter  CAD, with ACS in Jan 2016 - subtot mid LAD lesion without PCI; ischemic CM with EF 40% and chronic LE edema. Followed by [facility] cardiology  Chronic insomnia  Lymphedema bilateral LE's  Intermittent nausea  Chronic low back pain with narcotic use.  Indwelling narcotic pump  History CVA with dysarthria  Hypothyroidism, stable  CECI, not on CPAP  Anxiety and Depression  Chronic constipation, likely d/t ongoing narcotic use.     Meds: Reviewed and reconciled in EPIC with patient during visit today.     Review of Systems   Constitutional: Negative for fever.   HENT: Negative for congestion.    Respiratory: Negative for shortness of breath.    Cardiovascular: Negative for chest pain.   Gastrointestinal: Negative for abdominal pain.   Genitourinary: Positive for difficulty urinating and frequency.   Musculoskeletal: Positive for arthralgias and back pain.   Skin: Positive for rash.   Neurological: Positive for dizziness. Negative for headaches.   Psychiatric/Behavioral: Positive for sleep disturbance.       Objective:      Physical Exam  Vitals reviewed.   Constitutional:       Appearance: She is " well-developed. She is obese.   HENT:      Head: Normocephalic and atraumatic.   Cardiovascular:      Rate and Rhythm: Normal rate and regular rhythm.      Heart sounds: Normal heart sounds. No murmur heard.    No friction rub. No gallop.   Pulmonary:      Effort: Pulmonary effort is normal. No respiratory distress.      Breath sounds: Normal breath sounds. No wheezing or rales.   Chest:      Chest wall: No tenderness.   Skin:     General: Skin is warm and dry.      Findings: No erythema.   Neurological:      Mental Status: She is alert and oriented to person, place, and time.         Assessment:       1. Chronic pain syndrome    2. Narcotic dependency, continuous    3. Neurogenic bladder    4. Insomnia due to medical condition    5. Suprapubic catheter    6. Recurrent UTI (urinary tract infection)    7. Impaired functional mobility and activity tolerance    8. Screening for viral disease    9. Vascular disease    10. Abnormal finding of blood chemistry, unspecified         Plan:       Tasha was seen today for hospital follow up.    Diagnoses and all orders for this visit:    Chronic pain syndrome - remarkably complex medical history.  No rash; no evidence of shingles.  Referred to her pain physician    Narcotic dependency, continuous    Neurogenic bladder - the indwelling catheter is problematic to her; she'd like it removed, but I don't think she'd be happy with chronic incontinence    Insomnia due to medical condition - discussed sleep hygiene, again    Suprapubic catheter    Recurrent UTI (urinary tract infection)  -     Comprehensive Metabolic Panel; Future    Impaired functional mobility and activity tolerance    Screening for viral disease - no HIV testing has been done in the past  -     HIV 1/2 Ag/Ab (4th Gen); Future    Vascular disease - due for lipids  -     Lipid panel; Future    Abnormal finding of blood chemistry, unspecified   -     Lipid panel; Future    rtc 3 months    G Sriram Hodges MD MPH  Staff  Internist

## 2022-04-06 ENCOUNTER — TELEPHONE (OUTPATIENT)
Dept: NEPHROLOGY | Facility: CLINIC | Age: 67
End: 2022-04-06
Payer: MEDICARE

## 2022-04-06 LAB — HIV 1+2 AB+HIV1 P24 AG SERPL QL IA: NEGATIVE

## 2022-04-06 NOTE — TELEPHONE ENCOUNTER
----- Message from Maricel Pradhan sent at 4/6/2022 12:31 PM CDT -----  Pt spouse requesting call back regarding antibiotic  that was prescribed on her last visit on 3-31 . Pt is getting sick from Rx and throwing up every time its taken . Please call         Confirmed patient's contact info below:  Contact Name: Tasha Bronsonngelo  Phone Number: [phone]

## 2022-04-07 ENCOUNTER — TELEPHONE (OUTPATIENT)
Dept: INTERNAL MEDICINE | Facility: CLINIC | Age: 67
End: 2022-04-07
Payer: MEDICARE

## 2022-04-07 ENCOUNTER — TELEPHONE (OUTPATIENT)
Dept: NEPHROLOGY | Facility: CLINIC | Age: 67
End: 2022-04-07
Payer: MEDICARE

## 2022-04-07 NOTE — TELEPHONE ENCOUNTER
Mrs. Hawley needs to see infectious disease.            ----- Message from Christina Kiser sent at 4/7/2022  1:01 PM CDT -----  Contact: Dangelo () @ [phone]  Pts  say he spoke with Sheyla on 4-6-22 and was advised that you would send a message to Dr Amezquita.  He says he has not received a response yet and would like to know what is going on.  He says she is still delirious.  Pls call asap.

## 2022-04-14 ENCOUNTER — HOSPITAL ENCOUNTER (INPATIENT)
Facility: HOSPITAL | Age: 67
LOS: 7 days | Discharge: HOME HEALTH | DRG: 699 | End: 2022-04-25
Attending: EMERGENCY MEDICINE | Admitting: HOSPITALIST
Payer: MEDICARE

## 2022-04-14 ENCOUNTER — TELEPHONE (OUTPATIENT)
Dept: INTERNAL MEDICINE | Facility: CLINIC | Age: 67
End: 2022-04-14
Payer: MEDICARE

## 2022-04-14 DIAGNOSIS — T83.510A URINARY TRACT INFECTION ASSOCIATED WITH CYSTOSTOMY CATHETER, INITIAL ENCOUNTER: ICD-10-CM

## 2022-04-14 DIAGNOSIS — R53.1 WEAKNESS: ICD-10-CM

## 2022-04-14 DIAGNOSIS — M54.41 CHRONIC BILATERAL LOW BACK PAIN WITH BILATERAL SCIATICA: Primary | ICD-10-CM

## 2022-04-14 DIAGNOSIS — M54.42 CHRONIC BILATERAL LOW BACK PAIN WITH BILATERAL SCIATICA: Primary | ICD-10-CM

## 2022-04-14 DIAGNOSIS — G89.29 CHRONIC BILATERAL LOW BACK PAIN WITH BILATERAL SCIATICA: Primary | ICD-10-CM

## 2022-04-14 DIAGNOSIS — N39.0 URINARY TRACT INFECTION ASSOCIATED WITH CYSTOSTOMY CATHETER, INITIAL ENCOUNTER: ICD-10-CM

## 2022-04-14 DIAGNOSIS — R11.0 NAUSEA: ICD-10-CM

## 2022-04-14 DIAGNOSIS — A49.01 STAPH AUREUS INFECTION: ICD-10-CM

## 2022-04-14 DIAGNOSIS — I50.9 CHF (CONGESTIVE HEART FAILURE): ICD-10-CM

## 2022-04-14 DIAGNOSIS — R07.9 CHEST PAIN, RULE OUT ACUTE MYOCARDIAL INFARCTION: ICD-10-CM

## 2022-04-14 LAB
ALBUMIN SERPL BCP-MCNC: 3.4 G/DL (ref 3.5–5.2)
ALP SERPL-CCNC: 76 U/L (ref 55–135)
ALT SERPL W/O P-5'-P-CCNC: 9 U/L (ref 10–44)
AMPHET+METHAMPHET UR QL: NEGATIVE
ANION GAP SERPL CALC-SCNC: 11 MMOL/L (ref 8–16)
AST SERPL-CCNC: 14 U/L (ref 10–40)
BACTERIA #/AREA URNS HPF: ABNORMAL /HPF
BARBITURATES UR QL SCN>200 NG/ML: NEGATIVE
BASOPHILS # BLD AUTO: 0.03 K/UL (ref 0–0.2)
BASOPHILS NFR BLD: 0.5 % (ref 0–1.9)
BENZODIAZ UR QL SCN>200 NG/ML: NEGATIVE
BILIRUB SERPL-MCNC: 0.6 MG/DL (ref 0.1–1)
BILIRUB UR QL STRIP: NEGATIVE
BNP SERPL-MCNC: 94 PG/ML (ref 0–99)
BUN SERPL-MCNC: 21 MG/DL (ref 8–23)
BZE UR QL SCN: NEGATIVE
CALCIUM SERPL-MCNC: 9 MG/DL (ref 8.7–10.5)
CANNABINOIDS UR QL SCN: NEGATIVE
CHLORIDE SERPL-SCNC: 99 MMOL/L (ref 95–110)
CK SERPL-CCNC: 35 U/L (ref 20–180)
CLARITY UR: CLEAR
CO2 SERPL-SCNC: 33 MMOL/L (ref 23–29)
COLOR UR: YELLOW
CREAT SERPL-MCNC: 2.8 MG/DL (ref 0.5–1.4)
CREAT UR-MCNC: 109.2 MG/DL (ref 15–325)
DIFFERENTIAL METHOD: ABNORMAL
EOSINOPHIL # BLD AUTO: 0.1 K/UL (ref 0–0.5)
EOSINOPHIL NFR BLD: 1.3 % (ref 0–8)
ERYTHROCYTE [DISTWIDTH] IN BLOOD BY AUTOMATED COUNT: 15.1 % (ref 11.5–14.5)
EST. GFR  (AFRICAN AMERICAN): 20 ML/MIN/1.73 M^2
EST. GFR  (NON AFRICAN AMERICAN): 17 ML/MIN/1.73 M^2
ETHANOL SERPL-MCNC: <10 MG/DL
GLUCOSE SERPL-MCNC: 98 MG/DL (ref 70–110)
GLUCOSE UR QL STRIP: NEGATIVE
HCT VFR BLD AUTO: 29.8 % (ref 37–48.5)
HGB BLD-MCNC: 9.6 G/DL (ref 12–16)
HGB UR QL STRIP: ABNORMAL
IMM GRANULOCYTES # BLD AUTO: 0.01 K/UL (ref 0–0.04)
IMM GRANULOCYTES NFR BLD AUTO: 0.2 % (ref 0–0.5)
KETONES UR QL STRIP: NEGATIVE
LEUKOCYTE ESTERASE UR QL STRIP: ABNORMAL
LIPASE SERPL-CCNC: 22 U/L (ref 4–60)
LYMPHOCYTES # BLD AUTO: 0.9 K/UL (ref 1–4.8)
LYMPHOCYTES NFR BLD: 16.4 % (ref 18–48)
MAGNESIUM SERPL-MCNC: 2 MG/DL (ref 1.6–2.6)
MCH RBC QN AUTO: 28.9 PG (ref 27–31)
MCHC RBC AUTO-ENTMCNC: 32.2 G/DL (ref 32–36)
MCV RBC AUTO: 90 FL (ref 82–98)
METHADONE UR QL SCN>300 NG/ML: NEGATIVE
MICROSCOPIC COMMENT: ABNORMAL
MONOCYTES # BLD AUTO: 0.4 K/UL (ref 0.3–1)
MONOCYTES NFR BLD: 6.6 % (ref 4–15)
NEUTROPHILS # BLD AUTO: 4.1 K/UL (ref 1.8–7.7)
NEUTROPHILS NFR BLD: 75 % (ref 38–73)
NITRITE UR QL STRIP: POSITIVE
NRBC BLD-RTO: 0 /100 WBC
OPIATES UR QL SCN: ABNORMAL
PCP UR QL SCN>25 NG/ML: NEGATIVE
PH UR STRIP: 8 [PH] (ref 5–8)
PLATELET # BLD AUTO: 240 K/UL (ref 150–450)
PMV BLD AUTO: 10.1 FL (ref 9.2–12.9)
POCT GLUCOSE: 93 MG/DL (ref 70–110)
POTASSIUM SERPL-SCNC: 3 MMOL/L (ref 3.5–5.1)
PROT SERPL-MCNC: 6.9 G/DL (ref 6–8.4)
PROT UR QL STRIP: ABNORMAL
RBC # BLD AUTO: 3.32 M/UL (ref 4–5.4)
RBC #/AREA URNS HPF: 1 /HPF (ref 0–4)
SODIUM SERPL-SCNC: 143 MMOL/L (ref 136–145)
SP GR UR STRIP: 1.01 (ref 1–1.03)
SQUAMOUS #/AREA URNS HPF: 0 /HPF
TOXICOLOGY INFORMATION: ABNORMAL
TROPONIN I SERPL DL<=0.01 NG/ML-MCNC: <0.006 NG/ML (ref 0–0.03)
URN SPEC COLLECT METH UR: ABNORMAL
UROBILINOGEN UR STRIP-ACNC: NEGATIVE EU/DL
WBC # BLD AUTO: 5.48 K/UL (ref 3.9–12.7)
WBC #/AREA URNS HPF: 78 /HPF (ref 0–5)

## 2022-04-14 PROCEDURE — 85025 COMPLETE CBC W/AUTO DIFF WBC: CPT | Performed by: EMERGENCY MEDICINE

## 2022-04-14 PROCEDURE — 99900035 HC TECH TIME PER 15 MIN (STAT)

## 2022-04-14 PROCEDURE — 80307 DRUG TEST PRSMV CHEM ANLYZR: CPT | Performed by: EMERGENCY MEDICINE

## 2022-04-14 PROCEDURE — 63600175 PHARM REV CODE 636 W HCPCS: Performed by: EMERGENCY MEDICINE

## 2022-04-14 PROCEDURE — 82077 ASSAY SPEC XCP UR&BREATH IA: CPT | Performed by: EMERGENCY MEDICINE

## 2022-04-14 PROCEDURE — 96361 HYDRATE IV INFUSION ADD-ON: CPT

## 2022-04-14 PROCEDURE — 25000003 PHARM REV CODE 250: Performed by: EMERGENCY MEDICINE

## 2022-04-14 PROCEDURE — 82962 GLUCOSE BLOOD TEST: CPT

## 2022-04-14 PROCEDURE — 83880 ASSAY OF NATRIURETIC PEPTIDE: CPT | Performed by: EMERGENCY MEDICINE

## 2022-04-14 PROCEDURE — 96374 THER/PROPH/DIAG INJ IV PUSH: CPT

## 2022-04-14 PROCEDURE — 94761 N-INVAS EAR/PLS OXIMETRY MLT: CPT

## 2022-04-14 PROCEDURE — 99285 EMERGENCY DEPT VISIT HI MDM: CPT | Mod: 25

## 2022-04-14 PROCEDURE — 25000003 PHARM REV CODE 250: Performed by: NURSE PRACTITIONER

## 2022-04-14 PROCEDURE — 81000 URINALYSIS NONAUTO W/SCOPE: CPT | Mod: 59 | Performed by: EMERGENCY MEDICINE

## 2022-04-14 PROCEDURE — G0378 HOSPITAL OBSERVATION PER HR: HCPCS

## 2022-04-14 PROCEDURE — 82550 ASSAY OF CK (CPK): CPT | Performed by: EMERGENCY MEDICINE

## 2022-04-14 PROCEDURE — 63600175 PHARM REV CODE 636 W HCPCS: Performed by: NURSE PRACTITIONER

## 2022-04-14 PROCEDURE — 80053 COMPREHEN METABOLIC PANEL: CPT | Performed by: EMERGENCY MEDICINE

## 2022-04-14 PROCEDURE — 83735 ASSAY OF MAGNESIUM: CPT | Performed by: EMERGENCY MEDICINE

## 2022-04-14 PROCEDURE — 93010 ELECTROCARDIOGRAM REPORT: CPT | Mod: ,,, | Performed by: INTERNAL MEDICINE

## 2022-04-14 PROCEDURE — 83690 ASSAY OF LIPASE: CPT | Performed by: EMERGENCY MEDICINE

## 2022-04-14 PROCEDURE — 84484 ASSAY OF TROPONIN QUANT: CPT | Performed by: EMERGENCY MEDICINE

## 2022-04-14 PROCEDURE — 93005 ELECTROCARDIOGRAM TRACING: CPT

## 2022-04-14 PROCEDURE — 93010 EKG 12-LEAD: ICD-10-PCS | Mod: ,,, | Performed by: INTERNAL MEDICINE

## 2022-04-14 RX ORDER — KETOROLAC TROMETHAMINE 30 MG/ML
15 INJECTION, SOLUTION INTRAMUSCULAR; INTRAVENOUS ONCE
Status: COMPLETED | OUTPATIENT
Start: 2022-04-14 | End: 2022-04-14

## 2022-04-14 RX ORDER — ATORVASTATIN CALCIUM 40 MG/1
80 TABLET, FILM COATED ORAL DAILY
Status: DISCONTINUED | OUTPATIENT
Start: 2022-04-15 | End: 2022-04-25 | Stop reason: HOSPADM

## 2022-04-14 RX ORDER — QUETIAPINE FUMARATE 100 MG/1
200 TABLET, FILM COATED ORAL NIGHTLY
Status: DISCONTINUED | OUTPATIENT
Start: 2022-04-14 | End: 2022-04-25 | Stop reason: HOSPADM

## 2022-04-14 RX ORDER — POTASSIUM CHLORIDE 20 MEQ/1
20 TABLET, EXTENDED RELEASE ORAL ONCE
Status: COMPLETED | OUTPATIENT
Start: 2022-04-14 | End: 2022-04-14

## 2022-04-14 RX ORDER — ONDANSETRON 2 MG/ML
4 INJECTION INTRAMUSCULAR; INTRAVENOUS EVERY 8 HOURS PRN
Status: DISCONTINUED | OUTPATIENT
Start: 2022-04-14 | End: 2022-04-19

## 2022-04-14 RX ORDER — TRAZODONE HYDROCHLORIDE 100 MG/1
300 TABLET ORAL NIGHTLY
Status: DISCONTINUED | OUTPATIENT
Start: 2022-04-14 | End: 2022-04-25 | Stop reason: HOSPADM

## 2022-04-14 RX ORDER — PROCHLORPERAZINE EDISYLATE 5 MG/ML
10 INJECTION INTRAMUSCULAR; INTRAVENOUS
Status: COMPLETED | OUTPATIENT
Start: 2022-04-14 | End: 2022-04-14

## 2022-04-14 RX ORDER — SODIUM CHLORIDE 0.9 % (FLUSH) 0.9 %
10 SYRINGE (ML) INJECTION
Status: DISCONTINUED | OUTPATIENT
Start: 2022-04-14 | End: 2022-04-25 | Stop reason: HOSPADM

## 2022-04-14 RX ORDER — SENNOSIDES 8.6 MG/1
2 TABLET ORAL 2 TIMES DAILY
Status: DISCONTINUED | OUTPATIENT
Start: 2022-04-14 | End: 2022-04-25 | Stop reason: HOSPADM

## 2022-04-14 RX ADMIN — POTASSIUM CHLORIDE 20 MEQ: 20 TABLET, EXTENDED RELEASE ORAL at 05:04

## 2022-04-14 RX ADMIN — QUETIAPINE FUMARATE 200 MG: 100 TABLET ORAL at 10:04

## 2022-04-14 RX ADMIN — KETOROLAC TROMETHAMINE 15 MG: 30 INJECTION, SOLUTION INTRAMUSCULAR at 09:04

## 2022-04-14 RX ADMIN — SENNOSIDES 2 TABLET: 8.6 TABLET, FILM COATED ORAL at 10:04

## 2022-04-14 RX ADMIN — TRAZODONE HYDROCHLORIDE 300 MG: 100 TABLET ORAL at 10:04

## 2022-04-14 RX ADMIN — PROCHLORPERAZINE EDISYLATE 10 MG: 5 INJECTION INTRAMUSCULAR; INTRAVENOUS at 03:04

## 2022-04-14 RX ADMIN — SODIUM CHLORIDE 1000 ML: 0.9 INJECTION, SOLUTION INTRAVENOUS at 03:04

## 2022-04-14 RX ADMIN — CEFTRIAXONE 1 G: 1 INJECTION, SOLUTION INTRAVENOUS at 09:04

## 2022-04-14 NOTE — ED PROVIDER NOTES
Encounter Date: 4/14/2022       History     Chief Complaint   Patient presents with    Generalized Body Aches     Chronic low back pain, body aches for past 3-4 days with poor oral intake. Denies N/V. Denies fever. Presents awake, alert. No distress.     66-year-old female brought to the emergency department by EMS for evaluation of I think I have a bladder infection again.  Patient states that her urine has been red and orange.  Notes feeling cold.  States she has been feeling weak and her chronic back pain has been worse than usual.  Notes nausea without emesis.  Notes decreased p.o. intake secondary to the nausea, and is feeling more weak than usual.  Admits to constipation but denies any diarrhea.  Describes a constant, aching and throbbing low back pain radiating to her legs, worse with certain movements and positions and without alleviating factors.  Denies any recent trauma or fall.  No other symptoms reported at this time. Of note, patient is incontinent at baseline.         Review of patient's allergies indicates:   Allergen Reactions    (d)-limonene flavor      Other reaction(s): difficult intubation  Other reaction(s): Difficulty breathing    Bactrim [sulfamethoxazole-trimethoprim] Anaphylaxis    Benadryl [diphenhydramine hcl] Shortness Of Breath    Fentanyl Itching, Nausea And Vomiting and Swelling             Imitrex [sumatriptan succinate] Shortness Of Breath    Topamax [topiramate] Shortness Of Breath    Vancomycin Shortness Of Breath     Rash    Butorphanol tartrate     Darvocet a500 [propoxyphene n-acetaminophen]      Other reaction(s): Difficulty breathing    White petrolatum-zinc     Zinc oxide-white petrolatum      Other reaction(s): Difficulty breathing    Latex, natural rubber Itching and Rash    Phenytoin Rash and Other (See Comments)     Trouble breathing     Past Medical History:   Diagnosis Date    Anticoagulant long-term use     Anxiety     Arthritis     Bilateral  lower extremity edema     severe chronic    Carotid artery occlusion     Cataract     CHF (congestive heart failure)     Coronary artery disease     subtotalled LAD with collateral    Depression     Fever blister     Hypothyroid     Iron deficiency anemia     Lumbar radiculopathy     with chronic pain    Ocular migraine     Renal disorder     Sleep apnea     cpap     Past Surgical History:   Procedure Laterality Date    ADENOIDECTOMY      BACK SURGERY      x 12    CARDIAC CATHETERIZATION  2016    subtotalled LAD with right to left collaterals    CATARACT EXTRACTION W/  INTRAOCULAR LENS IMPLANT Left     Dr Coleman     CYSTOSCOPIC LITHOLAPAXY N/A 6/27/2019    Procedure: CYSTOLITHOLAPAXY;  Surgeon: Shireen Mayo MD;  Location: [facility] OR 2ND FLR;  Service: Urology;  Laterality: N/A;    CYSTOSCOPIC LITHOLAPAXY N/A 9/3/2019    Procedure: CYSTOLITHOLAPAXY;  Surgeon: Shireen Mayo MD;  Location: [facility] OR 2ND FLR;  Service: Urology;  Laterality: N/A;    CYSTOSCOPY N/A 7/13/2021    Procedure: CYSTOSCOPY;  Surgeon: Shireen Mayo MD;  Location: [facility] OR 1ST FLR;  Service: Urology;  Laterality: N/A;    CYSTOSCOPY  11/16/2021    Procedure: CYSTOSCOPY;  Surgeon: Shireen Mayo MD;  Location: [facility] OR 1ST FLR;  Service: Urology;;    ESOPHAGOGASTRODUODENOSCOPY N/A 5/23/2018    Procedure: ESOPHAGOGASTRODUODENOSCOPY (EGD);  Surgeon: Prince Vance MD;  Location: [facility] (4TH FLR);  Service: Endoscopy;  Laterality: N/A;  r/s 'd per Dr. Vance due to family emergency- ER    HYSTERECTOMY  1975    endometriosis    INJECTION OF BOTULINUM TOXIN TYPE A  7/13/2021    Procedure: INJECTION, BOTULINUM TOXIN, 200units;  Surgeon: Shireen Mayo MD;  Location: [facility] OR 1ST FLR;  Service: Urology;;    INJECTION OF BOTULINUM TOXIN TYPE A  11/16/2021    Procedure: INJECTION, BOTULINUM TOXIN, 200units;  Surgeon: Shireen Mayo MD;  Location: [facility] OR 1ST FLR;  Service: Urology;;    pain pump placement       SQ Dilaudid Pump managed by Dr. Hillman, Pain Management    REPLACEMENT OF CATHETER N/A 10/31/2019    Procedure: REPLACEMENT, CATHETER-SUPRAPUBIC;  Surgeon: Shireen Mayo MD;  Location: [facility] OR [address] [place] [address];  Service: Urology;  Laterality: N/A;    SPINAL CORD STIMULATOR REMOVAL      before [place]    SPINE SURGERY  5-13-13    CERVICAL FUSION    TONSILLECTOMY       Family History   Problem Relation Age of Onset    Cancer Mother 55        breast    Cancer Father         esophagus,had laryngectomy    Esophageal cancer Father     Parkinsonism Maternal Grandmother     Tremor Maternal Grandmother     No Known Problems Brother     No Known Problems Brother     Heart disease Maternal Uncle     Colon cancer Maternal Uncle         Less than 60    No Known Problems Sister     No Known Problems Maternal Aunt     Cirrhosis Paternal Aunt         ETOH    Liver disease Paternal Aunt         ETOH    Liver disease Paternal Uncle         ETOH    Cirrhosis Paternal Uncle         ETOH    No Known Problems Maternal Grandfather     No Known Problems Paternal Grandmother     No Known Problems Paternal Grandfather     Melanoma Neg Hx     Amblyopia Neg Hx     Blindness Neg Hx     Cataracts Neg Hx     Diabetes Neg Hx     Glaucoma Neg Hx     Hypertension Neg Hx     Macular degeneration Neg Hx     Retinal detachment Neg Hx     Strabismus Neg Hx     Stroke Neg Hx     Thyroid disease Neg Hx     Celiac disease Neg Hx     Colon polyps Neg Hx     Cystic fibrosis Neg Hx     Crohn's disease Neg Hx     Inflammatory bowel disease Neg Hx     Liver cancer Neg Hx     Rectal cancer Neg Hx     Stomach cancer Neg Hx     Ulcerative colitis Neg Hx     Lymphoma Neg Hx      Social History     Tobacco Use    Smoking status: Never Smoker    Smokeless tobacco: Never Used   Substance Use Topics    Alcohol use: Never    Drug use: No     Review of Systems   Constitutional: Positive for fatigue. Negative for chills and  fever.   HENT: Negative for congestion and sore throat.    Eyes: Negative for photophobia and visual disturbance.   Respiratory: Negative for cough and shortness of breath.    Cardiovascular: Negative for chest pain and palpitations.   Gastrointestinal: Positive for nausea. Negative for abdominal pain and vomiting.   Musculoskeletal: Positive for back pain. Negative for neck pain and neck stiffness.   Neurological: Negative for light-headedness, numbness and headaches.       Physical Exam     Initial Vitals [04/14/22 1433]   BP Pulse Resp Temp SpO2   (!) 109/55 82 15 98 °F (36.7 °C) (!) 93 %      MAP       --         Physical Exam    Nursing note and vitals reviewed.  Constitutional: She appears well-developed and well-nourished. No distress.   HENT:   Head: Normocephalic and atraumatic.   Eyes: Conjunctivae and EOM are normal. Pupils are equal, round, and reactive to light.   Neck: Neck supple. No tracheal deviation present.   Normal range of motion.  Cardiovascular: Normal rate and intact distal pulses.   Pulmonary/Chest: No respiratory distress.   Abdominal: Abdomen is soft. She exhibits no distension.   Musculoskeletal:         General: No edema. Normal range of motion.      Cervical back: Normal range of motion and neck supple.     Neurological: She is alert. No cranial nerve deficit. GCS score is 15. GCS eye subscore is 4. GCS verbal subscore is 5. GCS motor subscore is 6.   Skin: Skin is warm and dry.         ED Course   Procedures  Labs Reviewed   CBC W/ AUTO DIFFERENTIAL   COMPREHENSIVE METABOLIC PANEL   DRUG SCREEN PANEL, URINE EMERGENCY   ALCOHOL,MEDICAL (ETHANOL)   TROPONIN I   B-TYPE NATRIURETIC PEPTIDE   LIPASE   MAGNESIUM   CK   URINALYSIS   POCT GLUCOSE   POCT GLUCOSE MONITORING CONTINUOUS     EKG Readings: (Independently Interpreted)   Initial Reading: No STEMI. Previous EKG: Compared with most recent EKG Previous EKG Date: 2/12/2022 (Nonspecific change) . Rhythm: Sinus Bradycardia. Heart Rate: 47.  Ectopy: No Ectopy. ST Segments: Normal ST Segments. Axis: Left Axis Deviation.         X-Rays:   Independently Interpreted Readings:   Other Readings:  Imaging interpreted by radiologist and visualized by me:     Imaging Results          X-Ray Chest AP Portable (Final result)  Result time 04/14/22 15:08:52    Final result by Simone Rivera MD (04/14/22 15:08:52)                 Impression:      1. Chronic appearing interstitial findings, no large focal consolidation.      Electronically signed by: Simone Rivera MD  Date:    04/14/2022  Time:    15:08             Narrative:    EXAMINATION:  XR CHEST AP PORTABLE    CLINICAL HISTORY:  Weakness;    TECHNIQUE:  Single frontal view of the chest was performed.    COMPARISON:  02/04/2022    FINDINGS:  The cardiomediastinal silhouette is prominent noting magnification by technique, stable.  There is calcification of the aorta.  Spinal stimulator noted..  There is no pleural effusion.  The trachea is midline.  The lungs are symmetrically expanded bilaterally with coarse interstitial attenuation, accentuated by shallow inspiratory effort.  There is left basilar subsegmental atelectasis..  No large focal consolidation seen.  There is no pneumothorax.  The osseous structures are remarkable for degenerative changes noting surgical change of the cervical spine..                                 Medications   sodium chloride 0.9% bolus 1,000 mL (has no administration in time range)   prochlorperazine injection Soln 10 mg (has no administration in time range)     Medical Decision Making:   Initial Assessment:   66-year-old female brought to the emergency department by EMS complaining of possible urinary tract infection and chronic back pain  Differential Diagnosis:   Dehydration, UTI, pyelo, dehydration, electrolyte dyscrasias  Independently Interpreted Test(s):   I have ordered and independently interpreted X-rays - see prior notes.  I have ordered and independently  interpreted EKG Reading(s) - see prior notes  ED Management:  CBG WNL.  I have ordered some IV fluid and Compazine this patient while awaiting her lab results.  Patient will be handed off to Dr. Muniz for f/u of pending labs and further evaluation/management.                       Clinical Impression:   Final diagnoses:  [R53.1] Weakness  [M54.42, M54.41, G89.29] Chronic bilateral low back pain with bilateral sciatica (Primary)  [R11.0] Nausea                 Luisito Diaz MD  04/15/22 0509

## 2022-04-14 NOTE — PROVIDER PROGRESS NOTES - EMERGENCY DEPT.
==== ASSUMPTION OF CARE NOTE ====    Care of patient assumed by self, from Dr. Catracho Diaz, as of shift change.      Patient is a 65 yo WF  BIB EMS for complaint of bladder infection, weakness, decreased PO intake as well as chronic lower back pain.    Labs pending as of shift change.     CXR demonstrates chronic appearing interstitial findings, no large focal consolidation.    CBC w/diff H/H stable but somewhat decreased compared to labs drawn in late March - no indication for transfusion; no significant leukocytosis  CMP notable for K of 3.0 (will replace), but labs consistent with pt's baseline  Lipase, Mag, Trop, POCT glucose, CPK WNL  UDS positive for opiates (pt has chronic pain for which she receives hydromorphone via intrathecal pain pump)    Pt with suprapubic catheter; mild suprapubic tenderness pt states is not her baseline; pt hard of hearing; exam otherwise unremarkable; she is non-toxic and in no acute distress    UA nitrite positive. Rocephin administered.      Pt reports N/V with PO antibiotics in the past. Concern that patient will not be able to tolerate PO if discharged. In light of pt's complicated UTI, hypokalemia, and questionable ability to tolerate PO if discharged, will admit for to the [facility] Medicine service for further evaluation and management. Pt comfortable with plan for admission.    Tushar Muniz MD  Emergency Medicine

## 2022-04-14 NOTE — TELEPHONE ENCOUNTER
----- Message from Christina Pillai sent at 4/14/2022  1:31 PM CDT -----  Regarding: advice  Contact: [facility] - [phone]  Type: Home Health (orders, updates, clarifications, etc.)    Home Health Agency/Nurse:   [facility] - [phone]  Phone number:[facility] - [phone]    Reason for call:Patient has drainage from catheter, Heart rate in the 40's and is lethargic and may be sepsis    Comments: Transporting to hospital

## 2022-04-15 LAB
ALBUMIN SERPL BCP-MCNC: 3.1 G/DL (ref 3.5–5.2)
ALP SERPL-CCNC: 67 U/L (ref 55–135)
ALT SERPL W/O P-5'-P-CCNC: 9 U/L (ref 10–44)
ANION GAP SERPL CALC-SCNC: 11 MMOL/L (ref 8–16)
AST SERPL-CCNC: 15 U/L (ref 10–40)
BACTERIA #/AREA URNS HPF: ABNORMAL /HPF
BASOPHILS # BLD AUTO: 0.02 K/UL (ref 0–0.2)
BASOPHILS NFR BLD: 0.4 % (ref 0–1.9)
BILIRUB SERPL-MCNC: 0.5 MG/DL (ref 0.1–1)
BILIRUB UR QL STRIP: NEGATIVE
BUN SERPL-MCNC: 20 MG/DL (ref 8–23)
CALCIUM SERPL-MCNC: 8.8 MG/DL (ref 8.7–10.5)
CHLORIDE SERPL-SCNC: 101 MMOL/L (ref 95–110)
CLARITY UR: ABNORMAL
CO2 SERPL-SCNC: 30 MMOL/L (ref 23–29)
COLOR UR: YELLOW
CREAT SERPL-MCNC: 2.4 MG/DL (ref 0.5–1.4)
DIFFERENTIAL METHOD: ABNORMAL
EOSINOPHIL # BLD AUTO: 0.2 K/UL (ref 0–0.5)
EOSINOPHIL NFR BLD: 3.2 % (ref 0–8)
ERYTHROCYTE [DISTWIDTH] IN BLOOD BY AUTOMATED COUNT: 15 % (ref 11.5–14.5)
EST. GFR  (AFRICAN AMERICAN): 24 ML/MIN/1.73 M^2
EST. GFR  (NON AFRICAN AMERICAN): 21 ML/MIN/1.73 M^2
GLUCOSE SERPL-MCNC: 103 MG/DL (ref 70–110)
GLUCOSE UR QL STRIP: NEGATIVE
HCT VFR BLD AUTO: 28.3 % (ref 37–48.5)
HGB BLD-MCNC: 9.1 G/DL (ref 12–16)
HGB UR QL STRIP: ABNORMAL
IMM GRANULOCYTES # BLD AUTO: 0.01 K/UL (ref 0–0.04)
IMM GRANULOCYTES NFR BLD AUTO: 0.2 % (ref 0–0.5)
KETONES UR QL STRIP: NEGATIVE
LEUKOCYTE ESTERASE UR QL STRIP: ABNORMAL
LYMPHOCYTES # BLD AUTO: 1.8 K/UL (ref 1–4.8)
LYMPHOCYTES NFR BLD: 32.5 % (ref 18–48)
MCH RBC QN AUTO: 28.8 PG (ref 27–31)
MCHC RBC AUTO-ENTMCNC: 32.2 G/DL (ref 32–36)
MCV RBC AUTO: 90 FL (ref 82–98)
MICROSCOPIC COMMENT: ABNORMAL
MONOCYTES # BLD AUTO: 0.4 K/UL (ref 0.3–1)
MONOCYTES NFR BLD: 7.8 % (ref 4–15)
NEUTROPHILS # BLD AUTO: 3 K/UL (ref 1.8–7.7)
NEUTROPHILS NFR BLD: 55.9 % (ref 38–73)
NITRITE UR QL STRIP: NEGATIVE
NON-SQ EPI CELLS #/AREA URNS HPF: 0 /HPF
NRBC BLD-RTO: 0 /100 WBC
PH UR STRIP: 7 [PH] (ref 5–8)
PLATELET # BLD AUTO: 201 K/UL (ref 150–450)
PMV BLD AUTO: 9.9 FL (ref 9.2–12.9)
POCT GLUCOSE: 88 MG/DL (ref 70–110)
POTASSIUM SERPL-SCNC: 3.1 MMOL/L (ref 3.5–5.1)
PROT SERPL-MCNC: 6.1 G/DL (ref 6–8.4)
PROT UR QL STRIP: ABNORMAL
RBC # BLD AUTO: 3.16 M/UL (ref 4–5.4)
RBC #/AREA URNS HPF: 76 /HPF (ref 0–4)
SODIUM SERPL-SCNC: 142 MMOL/L (ref 136–145)
SP GR UR STRIP: 1.01 (ref 1–1.03)
SQUAMOUS #/AREA URNS HPF: 1 /HPF
URN SPEC COLLECT METH UR: ABNORMAL
UROBILINOGEN UR STRIP-ACNC: NEGATIVE EU/DL
WBC # BLD AUTO: 5.39 K/UL (ref 3.9–12.7)
WBC #/AREA URNS HPF: >100 /HPF (ref 0–5)
WBC CLUMPS URNS QL MICRO: ABNORMAL

## 2022-04-15 PROCEDURE — 25000003 PHARM REV CODE 250: Performed by: NURSE PRACTITIONER

## 2022-04-15 PROCEDURE — 87086 URINE CULTURE/COLONY COUNT: CPT | Performed by: HOSPITALIST

## 2022-04-15 PROCEDURE — 87077 CULTURE AEROBIC IDENTIFY: CPT | Performed by: HOSPITALIST

## 2022-04-15 PROCEDURE — 81000 URINALYSIS NONAUTO W/SCOPE: CPT | Performed by: HOSPITALIST

## 2022-04-15 PROCEDURE — 94761 N-INVAS EAR/PLS OXIMETRY MLT: CPT

## 2022-04-15 PROCEDURE — 97165 OT EVAL LOW COMPLEX 30 MIN: CPT

## 2022-04-15 PROCEDURE — 63600175 PHARM REV CODE 636 W HCPCS: Performed by: HOSPITALIST

## 2022-04-15 PROCEDURE — 87088 URINE BACTERIA CULTURE: CPT | Performed by: HOSPITALIST

## 2022-04-15 PROCEDURE — 97161 PT EVAL LOW COMPLEX 20 MIN: CPT

## 2022-04-15 PROCEDURE — G0378 HOSPITAL OBSERVATION PER HR: HCPCS

## 2022-04-15 PROCEDURE — 85025 COMPLETE CBC W/AUTO DIFF WBC: CPT | Performed by: NURSE PRACTITIONER

## 2022-04-15 PROCEDURE — 87186 SC STD MICRODIL/AGAR DIL: CPT | Performed by: HOSPITALIST

## 2022-04-15 PROCEDURE — 99900035 HC TECH TIME PER 15 MIN (STAT)

## 2022-04-15 PROCEDURE — 25000003 PHARM REV CODE 250: Performed by: HOSPITALIST

## 2022-04-15 PROCEDURE — 36415 COLL VENOUS BLD VENIPUNCTURE: CPT | Performed by: NURSE PRACTITIONER

## 2022-04-15 PROCEDURE — 80053 COMPREHEN METABOLIC PANEL: CPT | Performed by: NURSE PRACTITIONER

## 2022-04-15 RX ORDER — OXYCODONE HYDROCHLORIDE 5 MG/1
5 TABLET ORAL EVERY 6 HOURS PRN
Status: DISCONTINUED | OUTPATIENT
Start: 2022-04-15 | End: 2022-04-25 | Stop reason: HOSPADM

## 2022-04-15 RX ORDER — CEFEPIME HYDROCHLORIDE 1 G/50ML
1 INJECTION, SOLUTION INTRAVENOUS
Status: DISCONTINUED | OUTPATIENT
Start: 2022-04-15 | End: 2022-04-17

## 2022-04-15 RX ORDER — POTASSIUM CHLORIDE 20 MEQ/1
40 TABLET, EXTENDED RELEASE ORAL
Status: COMPLETED | OUTPATIENT
Start: 2022-04-15 | End: 2022-04-15

## 2022-04-15 RX ORDER — CEFEPIME HYDROCHLORIDE 1 G/50ML
1 INJECTION, SOLUTION INTRAVENOUS
Status: DISCONTINUED | OUTPATIENT
Start: 2022-04-15 | End: 2022-04-15

## 2022-04-15 RX ORDER — PANTOPRAZOLE SODIUM 40 MG/1
40 TABLET, DELAYED RELEASE ORAL DAILY
Status: DISCONTINUED | OUTPATIENT
Start: 2022-04-15 | End: 2022-04-25 | Stop reason: HOSPADM

## 2022-04-15 RX ORDER — ACETAMINOPHEN 325 MG/1
650 TABLET ORAL EVERY 6 HOURS PRN
Status: DISCONTINUED | OUTPATIENT
Start: 2022-04-15 | End: 2022-04-25 | Stop reason: HOSPADM

## 2022-04-15 RX ORDER — OXYBUTYNIN CHLORIDE 5 MG/1
15 TABLET, EXTENDED RELEASE ORAL DAILY
Status: DISCONTINUED | OUTPATIENT
Start: 2022-04-15 | End: 2022-04-25 | Stop reason: HOSPADM

## 2022-04-15 RX ORDER — FLUOXETINE HYDROCHLORIDE 20 MG/1
60 CAPSULE ORAL DAILY
Status: DISCONTINUED | OUTPATIENT
Start: 2022-04-15 | End: 2022-04-25 | Stop reason: HOSPADM

## 2022-04-15 RX ORDER — TORSEMIDE 20 MG/1
100 TABLET ORAL DAILY
Status: DISCONTINUED | OUTPATIENT
Start: 2022-04-16 | End: 2022-04-17

## 2022-04-15 RX ADMIN — OXYCODONE 5 MG: 5 TABLET ORAL at 04:04

## 2022-04-15 RX ADMIN — LEVOTHYROXINE SODIUM 125 MCG: 0.03 TABLET ORAL at 05:04

## 2022-04-15 RX ADMIN — TRAZODONE HYDROCHLORIDE 300 MG: 100 TABLET ORAL at 09:04

## 2022-04-15 RX ADMIN — POTASSIUM CHLORIDE 40 MEQ: 20 TABLET, EXTENDED RELEASE ORAL at 09:04

## 2022-04-15 RX ADMIN — QUETIAPINE FUMARATE 200 MG: 100 TABLET ORAL at 09:04

## 2022-04-15 RX ADMIN — PANTOPRAZOLE SODIUM 40 MG: 40 TABLET, DELAYED RELEASE ORAL at 11:04

## 2022-04-15 RX ADMIN — CEFEPIME 1 G: 1 INJECTION, POWDER, FOR SOLUTION INTRAMUSCULAR; INTRAVENOUS at 11:04

## 2022-04-15 RX ADMIN — OXYCODONE 5 MG: 5 TABLET ORAL at 11:04

## 2022-04-15 RX ADMIN — POTASSIUM CHLORIDE 40 MEQ: 20 TABLET, EXTENDED RELEASE ORAL at 11:04

## 2022-04-15 RX ADMIN — SENNOSIDES 2 TABLET: 8.6 TABLET, FILM COATED ORAL at 09:04

## 2022-04-15 RX ADMIN — OXYBUTYNIN CHLORIDE 15 MG: 5 TABLET, EXTENDED RELEASE ORAL at 11:04

## 2022-04-15 RX ADMIN — FLUOXETINE HYDROCHLORIDE 60 MG: 20 CAPSULE ORAL at 11:04

## 2022-04-15 RX ADMIN — ATORVASTATIN CALCIUM 80 MG: 40 TABLET, FILM COATED ORAL at 09:04

## 2022-04-15 NOTE — ASSESSMENT & PLAN NOTE
- Chronic and recurrent UTIs  - Followed by ID and urology outpatient  - UA Leukocytes 3+  - Rocephin initiated; switch to cefepime given hx of pseudomonas  -urine and blood cultures pending  - Consult urology for catheter change and evaluation

## 2022-04-15 NOTE — SUBJECTIVE & OBJECTIVE
Past Medical History:   Diagnosis Date    Anticoagulant long-term use     Anxiety     Arthritis     Bilateral lower extremity edema     severe chronic    Carotid artery occlusion     Cataract     CHF (congestive heart failure)     Coronary artery disease     subtotalled LAD with collateral    Depression     Fever blister     Hypothyroid     Iron deficiency anemia     Lumbar radiculopathy     with chronic pain    Ocular migraine     Renal disorder     Sleep apnea     cpap       Past Surgical History:   Procedure Laterality Date    ADENOIDECTOMY      BACK SURGERY      x 12    CARDIAC CATHETERIZATION  2016    subtotalled LAD with right to left collaterals    CATARACT EXTRACTION W/  INTRAOCULAR LENS IMPLANT Left     Dr Coleman     CYSTOSCOPIC LITHOLAPAXY N/A 6/27/2019    Procedure: CYSTOLITHOLAPAXY;  Surgeon: Shireen Mayo MD;  Location: [facility] OR 2ND FLR;  Service: Urology;  Laterality: N/A;    CYSTOSCOPIC LITHOLAPAXY N/A 9/3/2019    Procedure: CYSTOLITHOLAPAXY;  Surgeon: Shireen Mayo MD;  Location: [facility] OR 2ND FLR;  Service: Urology;  Laterality: N/A;    CYSTOSCOPY N/A 7/13/2021    Procedure: CYSTOSCOPY;  Surgeon: Shireen Mayo MD;  Location: [facility] OR 1ST FLR;  Service: Urology;  Laterality: N/A;    CYSTOSCOPY  11/16/2021    Procedure: CYSTOSCOPY;  Surgeon: Shireen Mayo MD;  Location: [facility] OR 1ST FLR;  Service: Urology;;    ESOPHAGOGASTRODUODENOSCOPY N/A 5/23/2018    Procedure: ESOPHAGOGASTRODUODENOSCOPY (EGD);  Surgeon: Prince Vance MD;  Location: [facility] (4TH FLR);  Service: Endoscopy;  Laterality: N/A;  r/s 'd per Dr. Vance due to family emergency- ER    HYSTERECTOMY  1975    endometriosis    INJECTION OF BOTULINUM TOXIN TYPE A  7/13/2021    Procedure: INJECTION, BOTULINUM TOXIN, 200units;  Surgeon: Shireen Mayo MD;  Location: [facility] OR 1ST FLR;  Service: Urology;;    INJECTION OF BOTULINUM TOXIN TYPE A  11/16/2021    Procedure: INJECTION, BOTULINUM TOXIN, 200units;  Surgeon: Shireen PICKETT  MD Maria Esther;  Location: [facility] OR [address] [place] [address];  Service: Urology;;    pain pump placement      SQ Dilaudid Pump managed by Dr. Hillman, Pain Management    REPLACEMENT OF CATHETER N/A 10/31/2019    Procedure: REPLACEMENT, CATHETER-SUPRAPUBIC;  Surgeon: Shireen Mayo MD;  Location: [facility] OR [address] [place] [address];  Service: Urology;  Laterality: N/A;    SPINAL CORD STIMULATOR REMOVAL      before Larissa    SPINE SURGERY  5-13-13    CERVICAL FUSION    TONSILLECTOMY         Review of patient's allergies indicates:   Allergen Reactions    (d)-limonene flavor      Other reaction(s): difficult intubation  Other reaction(s): Difficulty breathing    Bactrim [sulfamethoxazole-trimethoprim] Anaphylaxis    Benadryl [diphenhydramine hcl] Shortness Of Breath    Fentanyl Itching, Nausea And Vomiting and Swelling             Imitrex [sumatriptan succinate] Shortness Of Breath    Topamax [topiramate] Shortness Of Breath    Vancomycin Shortness Of Breath     Rash    Butorphanol tartrate     Darvocet a500 [propoxyphene n-acetaminophen]      Other reaction(s): Difficulty breathing    White petrolatum-zinc     Zinc oxide-white petrolatum      Other reaction(s): Difficulty breathing    Latex, natural rubber Itching and Rash    Phenytoin Rash and Other (See Comments)     Trouble breathing       No current facility-administered medications on file prior to encounter.     Current Outpatient Medications on File Prior to Encounter   Medication Sig    aspirin (ECOTRIN) 81 MG EC tablet Take 1 tablet (81 mg total) by mouth once daily.    atorvastatin (LIPITOR) 80 MG tablet TAKE ONE TABLET BY MOUTH EVERY DAY    butalbital-acetaminophen-caffeine -40 mg (FIORICET, ESGIC) -40 mg per tablet Take 1 tablet by mouth daily as needed.    FLUoxetine 20 MG capsule Take 3 capsules (60 mg total) by mouth once daily.    intrathecal pain pump compound Hydromorphone (7.5 mg/mL) infusion at 3.6799 mg/day (0.1533 mg/hr) out of a total reservoir volume of 37.3 mL  Pump  filled every 2 months    levothyroxine (SYNTHROID) 125 MCG tablet Take 1 tablet (125 mcg total) by mouth before breakfast.    lidocaine (LIDODERM) 5 % daily as needed.     nitroGLYCERIN (NITROSTAT) 0.4 MG SL tablet Place 1 tablet (0.4 mg total) under the tongue every 5 (five) minutes as needed for Chest pain.    ondansetron (ZOFRAN-ODT) 4 MG TbDL Take 1 tablet (4 mg total) by mouth every 8 (eight) hours as needed (nausea).    oxybutynin (DITROPAN XL) 15 MG TR24 Take 1 tablet (15 mg total) by mouth once daily.    pantoprazole (PROTONIX) 40 MG tablet TAKE ONE TABLET BY MOUTH EVERY DAY    potassium chloride (MICRO-K) 10 MEQ CpSR TAKE TWO CAPSULES BY MOUTH EVERY DAY    promethazine (PHENERGAN) 25 MG tablet Take 25 mg by mouth every 6 (six) hours as needed for Nausea.    QUEtiapine (SEROQUEL) 100 MG Tab Take 2 tablets (200 mg total) by mouth nightly.    QUEtiapine (SEROQUEL) 25 MG Tab Take 12.5-25 mg twice daily as needed for anxiety    senna (SENNA LAX) 8.6 mg tablet Take 2 tablets by mouth 2 (two) times daily.    torsemide (DEMADEX) 100 MG Tab TAKE ONE TABLET BY MOUTH EVERY DAY    traZODone (DESYREL) 100 MG tablet Take 3 tablets (300 mg total) by mouth every evening.     Family History       Problem Relation (Age of Onset)    Cancer Mother (55), Father    Cirrhosis Paternal Aunt, Paternal Uncle    Colon cancer Maternal Uncle    Esophageal cancer Father    Heart disease Maternal Uncle    Liver disease Paternal Aunt, Paternal Uncle    No Known Problems Brother, Brother, Sister, Maternal Aunt, Maternal Grandfather, Paternal Grandmother, Paternal Grandfather    Parkinsonism Maternal Grandmother    Tremor Maternal Grandmother          Tobacco Use    Smoking status: Never Smoker    Smokeless tobacco: Never Used   Substance and Sexual Activity    Alcohol use: Never    Drug use: No    Sexual activity: Never     Partners: Male     Review of Systems   Constitutional:  Positive for appetite change and fatigue. Negative for  unexpected weight change.   HENT:  Negative for congestion, dental problem and trouble swallowing.    Eyes:  Negative for redness and visual disturbance.   Respiratory:  Negative for cough and shortness of breath.    Gastrointestinal:  Negative for abdominal distention, abdominal pain, nausea and vomiting.   Endocrine: Negative for polydipsia and polyuria.   Genitourinary:  Positive for difficulty urinating.   Musculoskeletal:  Positive for back pain. Negative for gait problem and neck pain.   Skin:  Negative for color change and rash.   Allergic/Immunologic: Negative for food allergies.   Neurological:  Positive for weakness.   Psychiatric/Behavioral:  Negative for agitation.    Objective:     Vital Signs (Most Recent):  Temp: 98 °F (36.7 °C) (04/14/22 1433)  Pulse: (!) 53 (04/14/22 2021)  Resp: 13 (04/14/22 2021)  BP: (!) 109/55 (04/14/22 1433)  SpO2: (!) 92 % (04/14/22 2021)   Vital Signs (24h Range):  Temp:  [98 °F (36.7 °C)] 98 °F (36.7 °C)  Pulse:  [44-82] 53  Resp:  [13-15] 13  SpO2:  [92 %-97 %] 92 %  BP: (109)/(55) 109/55     Weight: 82.1 kg (181 lb)  Body mass index is 31.07 kg/m².    Physical Exam  Constitutional:       Appearance: She is ill-appearing.   HENT:      Head: Atraumatic.      Nose: Nose normal.      Mouth/Throat:      Mouth: Mucous membranes are moist.   Eyes:      Pupils: Pupils are equal, round, and reactive to light.   Cardiovascular:      Rate and Rhythm: Bradycardia present.      Pulses: Normal pulses.   Pulmonary:      Effort: Pulmonary effort is normal.   Abdominal:      General: Abdomen is flat. Bowel sounds are normal.      Palpations: Abdomen is soft.   Musculoskeletal:         General: Normal range of motion.      Cervical back: Normal range of motion.   Skin:     General: Skin is warm and dry.      Capillary Refill: Capillary refill takes less than 2 seconds.   Neurological:      Mental Status: She is alert and oriented to person, place, and time.   Psychiatric:         Mood and  Affect: Mood normal.         CRANIAL NERVES     CN III, IV, VI   Pupils are equal, round, and reactive to light.     Significant Labs: All pertinent labs within the past 24 hours have been reviewed.  Recent Lab Results         04/14/22  1713   04/14/22  1540   04/14/22  1501        Benzodiazepines Negative           Methadone metabolites Negative           Phencyclidine Negative           Albumin   3.4         Alcohol, Serum   <10         Alkaline Phosphatase   76         ALT   9         Amphetamine Screen, Ur Negative           Anion Gap   11         Appearance, UA Clear           AST   14  Comment: Specimen slightly hemolyzed         Bacteria, UA Rare           Barbiturate Screen, Ur Negative           Baso #   0.03         Basophil %   0.5         Bilirubin (UA) Negative           BILIRUBIN TOTAL   0.6  Comment: For infants and newborns, interpretation of results should be based  on gestational age, weight and in agreement with clinical  observations.    Premature Infant recommended reference ranges:  Up to 24 hours.............<8.0 mg/dL  Up to 48 hours............<12.0 mg/dL  3-5 days..................<15.0 mg/dL  6-29 days.................<15.0 mg/dL           BNP   94  Comment: Values of less than 100 pg/ml are consistent with non-CHF populations.         BUN   21         Calcium   9.0         Chloride   99         CO2   33         Cocaine (Metab.) Negative           Color, UA Yellow           CPK   35         Creatinine   2.8         Creatinine, Urine 109.2           Differential Method   Automated         eGFR if    20         eGFR if non    17  Comment: Calculation used to obtain the estimated glomerular filtration  rate (eGFR) is the CKD-EPI equation.            Eos #   0.1         Eosinophil %   1.3         Glucose   98         Glucose, UA Negative           Gran # (ANC)   4.1         Gran %   75.0         Hematocrit   29.8         Hemoglobin   9.6         Immature Grans  (Abs)   0.01  Comment: Mild elevation in immature granulocytes is non specific and   can be seen in a variety of conditions including stress response,   acute inflammation, trauma and pregnancy. Correlation with other   laboratory and clinical findings is essential.           Immature Granulocytes   0.2         Ketones, UA Negative           Leukocytes, UA 3+           Lipase   22         Lymph #   0.9         Lymph %   16.4         Magnesium   2.0         MCH   28.9         MCHC   32.2         MCV   90         Microscopic Comment SEE COMMENT  Comment: Other formed elements not mentioned in the report are not   present in the microscopic examination.              Mono #   0.4         Mono %   6.6         MPV   10.1         NITRITE UA Positive           nRBC   0         Occult Blood UA Trace           Opiate Scrn, Ur Presumptive Positive           pH, UA 8.0           Platelets   240         POCT Glucose     93       Potassium   3.0         PROTEIN TOTAL   6.9         Protein, UA Trace  Comment: Recommend a 24 hour urine protein or a urine   protein/creatinine ratio if globulin induced proteinuria is  clinically suspected.             RBC   3.32         RBC, UA 1           RDW   15.1         Sodium   143         Specific [place], UA 1.015           Specimen UA Urine, Catheterized           Squam Epithel, UA 0           Marijuana (THC) Metabolite Negative           Toxicology Information SEE COMMENT  Comment: This screen includes the following classes of drugs at the listed   cut-off:    Benzodiazepines 200 ng/ml  Methadone 300 ng/ml  Cocaine metabolite 300 ng/ml  Opiates 300 ng/ml  Barbiturates 200 ng/ml  Amphetamines 1000 ng/ml  Marijuana metabs (THC) 50 ng/ml  Phencyclidine (PCP) 25 ng/ml    This is a screening test. If results do not correlate with clinical   presentation, then a confirmatory send out test is advised.     This report is intended for use in clinical monitoring and management   of   patients. It is  not intended for use in employment related drug   testing.             Troponin I   <0.006  Comment: The reference interval for Troponin I represents the 99th percentile   cutoff   for our facility and is consistent with 3rd generation assay   performance.           UROBILINOGEN UA Negative           WBC, UA 78           WBC   5.48                 Significant Imaging: I have reviewed all pertinent imaging results/findings within the past 24 hours.

## 2022-04-15 NOTE — PT/OT/SLP EVAL
Occupational Therapy   Evaluation    Name: Tasha Hawley  MRN: [id number]  Admitting Diagnosis:  CARITO (acute kidney injury)  Recent Surgery: * No surgery found *    The primary encounter diagnosis was Chronic bilateral low back pain with bilateral sciatica. Diagnoses of Weakness and Nausea were also pertinent to this visit.      Recommendations:     Discharge Recommendations: home health OT, home with home health  Discharge Equipment Recommendations:  none  Barriers to discharge:  None    Assessment:     Tasha aHwley is a 66 y.o. female with a medical diagnosis of CARITO (acute kidney injury).  She presents with Performance deficits affecting function: weakness, impaired endurance, impaired self care skills, impaired functional mobilty, gait instability, impaired balance, decreased coordination, decreased upper extremity function, decreased lower extremity function, decreased safety awareness, pain.      Rehab Prognosis: Fair; patient would benefit from acute skilled OT services to address these deficits and reach maximum level of function.       Plan:     Patient to be seen 3 x/week to address the above listed problems via self-care/home management, therapeutic activities, therapeutic exercises  · Plan of Care Expires: 05/15/22  · Plan of Care Reviewed with: patient    Subjective     Chief Complaint: back pain  Patient/Family Comments/goals: none    Occupational Profile:  Living Environment: pt lives with her spouse in [facility] with no steps; t/s combo with TTB and GB.  Previous level of function: pt was assisted with ADLS via spouse; ambulated MI using rollator; pt suffers with chronic back pain; spouse does all IADL:S and drives.   Roles and Routines: active in basic selfcare  Equipment Used at Home:  wheelchair, rollator, bath bench, grab bar, bedside commode  Assistance upon Discharge: spouse    Pain/Comfort:  · Pain Rating 1: 9/10  · Location - Side 1: Bilateral  · Location - Orientation 1:  generalized  · Location 1:  (back)  · Pain Addressed 1: Reposition, Distraction, Cessation of Activity, Nurse notified, Pre-medicate for activity  · Pain Rating Post-Intervention 1: 9/10    Patients cultural, spiritual, Mandaeism conflicts given the current situation: no    Objective:     Communicated with: nurse prior to session.  Patient found HOB elevated with bed alarm, telemetry (suprpubic catheter) upon OT entry to room.    General Precautions: Standard, fall, hearing impaired   Orthopedic Precautions:N/A   Braces: N/A  Respiratory Status: Room air    Occupational Performance:    Bed Mobility:    · Patient completed Rolling/Turning to Left with  supervision and with side rail  · Patient completed Scooting/Bridging with supervision and with side rail  · Patient completed Supine to Sit with supervision and with side rail    Functional Mobility/Transfers:  · Patient completed Sit <> Stand Transfer with contact guard assistance  with  rolling walker   · Functional Mobility: ambulated ~8' forward and backwards using RW, unsteady but no LOB; c/o back pain.    Activities of Daily Living:  · Feeding:  independence .  · Grooming: independence seated EOB  · Lower Body Dressing: total assistance socks, spouse does for her at home; pt at baseline  · Toileting: meadows;  .    Cognitive/Visual Perceptual:  Cognitive/Psychosocial Skills:     -       Oriented to: Person, Place, Time and Situation   -       Follows Commands/attention:Follows one-step commands  -       Communication: clear/fluent  -       Memory: Poor immediate recall  -       Safety awareness/insight to disability: impaired   -       Mood/Affect/Coping skills/emotional control: Cooperative  Visual/Perceptual:      -Intact glasses    Physical Exam:  Balance:    -       siting: good  dynamic; fair plus  standing: CGA with RW, fair  dynamic: min A  Postural examination/scapula alignment:    -       Rounded shoulders  Dominant hand:    -       right  Upper  Extremity Range of Motion:     -       Right Upper Extremity: WFL  -       Left Upper Extremity: WFL  Upper Extremity Strength:    -       Right Upper Extremity: WFL except shld 3+/5  -       Left Upper Extremity: WFL except shld 3+/5   Strength:    -       Right Upper Extremity: WFL  -       Left Upper Extremity: WFL    AMPAC 6 Click ADL:  AMPAC Total Score: 20    Treatment & Education:  Purpose of OT and POC  Pt eager to eat her lunch , did not want lunch to get cold, so OT eval only today.  Pt left siting EOB, educated in fall prevention, call staff for Assist, do not get up without staff assist, she verbalized understanding and bed alarm activated and nurse made aware. Tray table placed in front of pt to eat lunch.   Education:    Patient left sitting edge of bed with all lines intact, call button in reach, bed alarm on and nurse notified    GOALS:   Multidisciplinary Problems     Occupational Therapy Goals        Problem: Occupational Therapy    Goal Priority Disciplines Outcome Interventions   Occupational Therapy Goal     OT, PT/OT Ongoing, Progressing    Description: Goals to be met by: 4/30/2022    Patient will increase functional independence with ADLs by performing:    UE Dressing with Modified [place].  LE Dressing with Minimal Assistance.  Grooming while seated with Modified [place].  Toileting from bedside commode with Stand-by Assistance for hygiene and clothing management.   Supine to sit with Modified [place].  Step transfer with Stand-by Assistance  Toilet transfer to bedside commode with Stand-by Assistance.  Increased functional strength to WFL for ADLS.  Upper extremity exercise program x 10 reps per handout, with supervision.                     History:     Past Medical History:   Diagnosis Date    Anticoagulant long-term use     Anxiety     Arthritis     Bilateral lower extremity edema     severe chronic    Carotid artery occlusion     Cataract     CHF (congestive  heart failure)     Coronary artery disease     subtotalled LAD with collateral    Depression     Fever blister     Hypothyroid     Iron deficiency anemia     Lumbar radiculopathy     with chronic pain    Ocular migraine     Renal disorder     Sleep apnea     cpap       Past Surgical History:   Procedure Laterality Date    ADENOIDECTOMY      BACK SURGERY      x 12    CARDIAC CATHETERIZATION  2016    subtotalled LAD with right to left collaterals    CATARACT EXTRACTION W/  INTRAOCULAR LENS IMPLANT Left     Dr Coleman     CYSTOSCOPIC LITHOLAPAXY N/A 6/27/2019    Procedure: CYSTOLITHOLAPAXY;  Surgeon: Shireen Mayo MD;  Location: [facility] OR 2ND FLR;  Service: Urology;  Laterality: N/A;    CYSTOSCOPIC LITHOLAPAXY N/A 9/3/2019    Procedure: CYSTOLITHOLAPAXY;  Surgeon: Shireen Mayo MD;  Location: [facility] OR 2ND FLR;  Service: Urology;  Laterality: N/A;    CYSTOSCOPY N/A 7/13/2021    Procedure: CYSTOSCOPY;  Surgeon: Shireen Mayo MD;  Location: [facility] OR 1ST FLR;  Service: Urology;  Laterality: N/A;    CYSTOSCOPY  11/16/2021    Procedure: CYSTOSCOPY;  Surgeon: Shireen Mayo MD;  Location: [facility] OR 1ST FLR;  Service: Urology;;    ESOPHAGOGASTRODUODENOSCOPY N/A 5/23/2018    Procedure: ESOPHAGOGASTRODUODENOSCOPY (EGD);  Surgeon: Prince Vance MD;  Location: [facility] (4TH FLR);  Service: Endoscopy;  Laterality: N/A;  r/s 'd per Dr. Vance due to family emergency- ER    HYSTERECTOMY  1975    endometriosis    INJECTION OF BOTULINUM TOXIN TYPE A  7/13/2021    Procedure: INJECTION, BOTULINUM TOXIN, 200units;  Surgeon: Shireen Mayo MD;  Location: [facility] OR 1ST FLR;  Service: Urology;;    INJECTION OF BOTULINUM TOXIN TYPE A  11/16/2021    Procedure: INJECTION, BOTULINUM TOXIN, 200units;  Surgeon: Shireen Mayo MD;  Location: [facility] OR 1ST FLR;  Service: Urology;;    pain pump placement      SQ Dilaudid Pump managed by Dr. Hillman, Pain Management    REPLACEMENT OF CATHETER N/A 10/31/2019     Procedure: REPLACEMENT, CATHETER-SUPRAPUBIC;  Surgeon: Shireen Mayo MD;  Location: [facility] OR [address] [place] [address];  Service: Urology;  Laterality: N/A;    SPINAL CORD STIMULATOR REMOVAL      before Larissa    SPINE SURGERY  5-13-13    CERVICAL FUSION    TONSILLECTOMY         Time Tracking:     OT Date of Treatment: 04/15/22  OT Start Time: 1215  OT Stop Time: 1224  OT Total Time (min): 9 min    Billable Minutes:Evaluation 9  Total Time 9    4/15/2022

## 2022-04-15 NOTE — PLAN OF CARE
Problem: Adult Inpatient Plan of Care  Goal: Plan of Care Review  Outcome: Ongoing, Progressing  Pt arrived from ED. Care plan explained & understood by pt. Ilya on Tele, HR 42 to 46., NP Peewee notified.  /58. O2 sats 97% RA.  Pt alert & oriented. BG 88. Meds given as per MAR. Safety maintained at all times. Call bell within reach. Bed on low position. Bed alarm on. Will continue to monitor.

## 2022-04-15 NOTE — PLAN OF CARE
Problem: Physical Therapy  Goal: Physical Therapy Goal  Description: Goals to be met by: discharge date     Patient will increase functional independence with mobility by performin. Supine to sit with Modified [place]  2. Sit to supine with Modified [place]  3. Sit to stand transfer with Modified [place]  4. Bed to chair transfer with Modified [place] and Supervision using Rolling Walker and janeen OOB chair > 1 hr  5. Gait  x 100 feet with Stand-by Assistance using Rolling Walker.   6. Lower extremity exercise program x15 reps   Outcome: Ongoing, Progressing     PT Evaluation performed.  Pt cooperative throughout.  Pt with min assist/CGA with ambulation w RW ~20' but then reported dizziness and nausea and req'd return to supine.  Cont acute therapy participation with ongoing assessment for discharge recommendations as pt with limited tolerance for OOB activity at this time.  Anticipate possible HH PT pending resolution of symptoms.

## 2022-04-15 NOTE — PT/OT/SLP EVAL
Physical Therapy Evaluation    Patient Name:  Tasha Hawley   MRN:  [id number]    Recommendations:     Discharge Recommendations:  other (see comments) (TBD    poss HH PT)   Discharge Equipment Recommendations:  (TBD)   Barriers to discharge: pt req increased level of assist from premorbid levels at this time - dependent on available home assist and pt's progress    Assessment:     Tasha Hawley is a 66 y.o. female admitted with a medical diagnosis of CARITO (acute kidney injury).  She presents with the following impairments/functional limitations:  weakness, gait instability, impaired endurance, impaired self care skills, impaired functional mobilty, impaired balance, decreased lower extremity function, decreased upper extremity function, decreased safety awareness, pain, edema, decreased ROM, impaired cardiopulmonary response to activity.  Pt cooperative throughout.  Pt with min assist/CGA with ambulation w RW ~20' but then reported dizziness and nausea and req'd return to supine.  Cont acute therapy participation with ongoing assessment for discharge recommendations as pt with limited tolerance for OOB activity at this time.  Anticipate possible HH PT pending resolution of symptoms    Rehab Prognosis: Good; patient would benefit from acute skilled PT services to address these deficits and reach maximum level of function.    Recent Surgery: * No surgery found *      Plan:     During this hospitalization, patient to be seen 5 x/week to address the identified rehab impairments via gait training, therapeutic activities, therapeutic exercises, neuromuscular re-education and progress toward the following goals:    · Plan of Care Expires:  05/15/22    Subjective     Chief Complaint: c/o chronic pain  - 8.5 currently    Reporting pain at low back, lower stomach and in B LE which she reports is at her usual levels of always having pain - reports she is due to receive pain meds soon;  Pleasant and Cooperative  "throughout; after ambulation trial pt reported dizziness and nausea and req'd return to supine for rest  Patient/Family Comments/goals: PLOF  Pain/Comfort:  · Pain Rating 1: 8/10 (81/5 reported)  · Location - Orientation 1: generalized  · Location 1:  (chronic pain reported  "my low back, lower stomach and both legs")  · Pain Addressed 1: Pre-medicate for activity, Reposition, Distraction, Nurse notified  · Pain Rating Post-Intervention 1:  (no increase or change in pain levels reported following session)    Patients cultural, spiritual, Anglican conflicts given the current situation: no    Living Environment:  Pt reports lives w  in [facility] with no BRENDA but has ramp at entry.  Pt ambulates household levels w rollator and limited community distances using rollator and wheelchair when needed.  Pt has BSC she uses "at the times I need it"  But otherwise uses her restroom in her home.  Pt reports using tub/shower combo w bath bench and grab bars.  She reports mod ind with ADL and ambulation w rollator.   Equipment used at home: wheelchair, rollator, bath bench, grab bar, bedside commode. Upon discharge, patient will have assistance from .    Objective:     Communicated with nsg prior to session.  Patient found sitting edge of bed with bed alarm, telemetry, Other (comments) (suprapubic catheter)  upon PT entry to room.    General Precautions: Standard, fall   Orthopedic Precautions:N/A   Braces: N/A  Respiratory Status: Room air    Exams:  Cognitive:  A & O x 3          Pt demo decreased safety awareness despite VC provided when perf ADL w decreased environmental awareness and safe technique not holding onto support and later requiring therapist assist to regain balance after quick changing direction at bedside without using the support of AD next to her   BLE ROM  WFL        Decreased flexibility hs, calf  BLE MMT  WFL   Grossly 4-/5 - minimal testing resistance provided due to pt's pain - modified MMT "   Static sit balance Good       Dynamic sit balance Good-/Fair+        w posterior lean seated pt reported dizziness at end of session  Static stance balance w RW Good-              Dynamic stance balance w RW Good-   When utilizing safe technique  Without AD use, pt demo LOB req min/mod assist to correct for during change in direction at bedside steps x 2 trials  Kyphotic flexed trunk posture w fwd head, abd rot scap asymmetry, facial asymmetry droop hx per chart,     Functional Mobility:  · Bed mobility: mod ind to SBA throughout rolling and scoot  · Txfs:  Sup<>sit with SBA and VC technique;  Sit<>stand with CGA for intial safety  · Pre gait and gait:   amb with RW CGA ~20' with straight path and change in direction with flexed posture     Therapeutic Activities and Exercises:  POC, safety w mobility, safety ed w tech AD use.  Initially pt reported no dizziness w stance and then after ~10' ambulation reported dizziness but continued gait trial to bed and remained standing to adjust her bed linen despite encouragement to sit, but then pt quickly sat and reported nausea and significant dizziness w emesis bucket provided but ultimately not needed, pt reported improved dizziness and nausea after seated rest ~5 min and return to supine resting position;  Bed mobility training, txf training, pre gait and gait training, balance and endurance act;       AM-PAC 6 CLICK MOBILITY  Total Score:18     Patient left supine with all lines intact, call button in reach, bed alarm on and nsg notified.    GOALS:   Multidisciplinary Problems     Physical Therapy Goals        Problem: Physical Therapy    Goal Priority Disciplines Outcome Goal Variances Interventions   Physical Therapy Goal     PT, PT/OT Ongoing, Progressing     Description: Goals to be met by: discharge date     Patient will increase functional independence with mobility by performin. Supine to sit with Modified [place]  2. Sit to supine with Modified  [place]  3. Sit to stand transfer with Modified [place]  4. Bed to chair transfer with Modified [place] and Supervision using Rolling Walker and janeen OOB chair > 1 hr  5. Gait  x 100 feet with Stand-by Assistance using Rolling Walker.   6. Lower extremity exercise program x15 reps                    History:     Past Medical History:   Diagnosis Date    Anticoagulant long-term use     Anxiety     Arthritis     Bilateral lower extremity edema     severe chronic    Carotid artery occlusion     Cataract     CHF (congestive heart failure)     Coronary artery disease     subtotalled LAD with collateral    Depression     Fever blister     Hypothyroid     Iron deficiency anemia     Lumbar radiculopathy     with chronic pain    Ocular migraine     Renal disorder     Sleep apnea     cpap       Past Surgical History:   Procedure Laterality Date    ADENOIDECTOMY      BACK SURGERY      x 12    CARDIAC CATHETERIZATION  2016    subtotalled LAD with right to left collaterals    CATARACT EXTRACTION W/  INTRAOCULAR LENS IMPLANT Left     Dr Coleman     CYSTOSCOPIC LITHOLAPAXY N/A 6/27/2019    Procedure: CYSTOLITHOLAPAXY;  Surgeon: Shireen Mayo MD;  Location: [facility] OR 2ND FLR;  Service: Urology;  Laterality: N/A;    CYSTOSCOPIC LITHOLAPAXY N/A 9/3/2019    Procedure: CYSTOLITHOLAPAXY;  Surgeon: Shireen Mayo MD;  Location: [facility] OR 2ND FLR;  Service: Urology;  Laterality: N/A;    CYSTOSCOPY N/A 7/13/2021    Procedure: CYSTOSCOPY;  Surgeon: Shireen Mayo MD;  Location: [facility] OR 1ST FLR;  Service: Urology;  Laterality: N/A;    CYSTOSCOPY  11/16/2021    Procedure: CYSTOSCOPY;  Surgeon: Shireen Mayo MD;  Location: [facility] OR 1ST FLR;  Service: Urology;;    ESOPHAGOGASTRODUODENOSCOPY N/A 5/23/2018    Procedure: ESOPHAGOGASTRODUODENOSCOPY (EGD);  Surgeon: Prince Vance MD;  Location: [facility] (4TH FLR);  Service: Endoscopy;  Laterality: N/A;  r/s 'd per Dr. Vance due to family  emergency- ER    HYSTERECTOMY  1975    endometriosis    INJECTION OF BOTULINUM TOXIN TYPE A  7/13/2021    Procedure: INJECTION, BOTULINUM TOXIN, 200units;  Surgeon: Shireen Mayo MD;  Location: [facility] OR [address] [place] [address];  Service: Urology;;    INJECTION OF BOTULINUM TOXIN TYPE A  11/16/2021    Procedure: INJECTION, BOTULINUM TOXIN, 200units;  Surgeon: Shireen Mayo MD;  [facility];  Service: Urology;;    pain pump placement      SQ Dilaudid Pump managed by Dr. Hillman, Pain Management    REPLACEMENT OF CATHETER N/A 10/31/2019    Procedure: REPLACEMENT, CATHETER-SUPRAPUBIC;  Surgeon: Shireen Mayo MD;  Location: [facility] OR [address] [place] [address];  Service: Urology;  Laterality: N/A;    SPINAL CORD STIMULATOR REMOVAL      before [place]    SPINE SURGERY  5-13-13    CERVICAL FUSION    TONSILLECTOMY         Time Tracking:     PT Received On: 04/15/22  PT Start Time: 1420     PT Stop Time: 1439  PT Total Time (min): 19 min     Billable Minutes: Evaluation 19      04/15/2022

## 2022-04-15 NOTE — H&P
[facility] Medicine  History & Physical    Patient Name: Tasha Hawley  MRN: [id number]  Patient Class: OP- Observation  Admission Date: 4/14/2022  Attending Physician: Nic Mistry, *   Primary Care Provider: Mesfin Hodges Ii, MD         Patient information was obtained from patient, past medical records and ER records.     Subjective:     Principal Problem: Generalized body aches     Chief Complaint:   Chief Complaint   Patient presents with    Generalized Body Aches     Chronic low back pain, body aches for past 3-4 days with poor oral intake. Denies N/V. Denies fever. Presents awake, alert. No distress.        HPI: Tasha Hawley is a 67 yo female with a past medical history of Depression, Anemia, Congestive heart failure, Renal disorder, Lumbar radiculopathy, Sleep apnea, Neurogenic bladder in which she has a suprapubic catheter, recurrent UTI, Chronic pain with epidural pain pump, Coronary artery disease, Hypothyroidism, and Sleep apnea. Her PCP is Dr. Mesfin Hodges. She is established with Dr. Karla Amezquita for Nephrology. Dr. Mayo is her Urologist. She has home health with [facility] . She presented to the ED at [facility] via EMS with a cc of generalized body aches for the past 3-4 days. Associated symptoms include poor oral intake, red and orange colored urine, back pain and nausea. She also reports constipation. She denies fever, diarrhea, headaches, or blurred vision.     ED workup revealed WBC UA 78, UA Nitrate +, UA Leukocytes 3+, Hgb 9.6, Hct 29.8, K 3.0, CO2 33, Cr 2.8, Albumin 3.4, ALT 9, Drug screen + Opiate, EKG SB, rate 47, CXR Chronic appearing interstitial findings, no large focal consolidation. IV Rocephin initiated. Urology consulted. Admitted to Hospital medicine for further evaluation and treatment.       Past Medical History:   Diagnosis Date    Anticoagulant long-term use     Anxiety     Arthritis     Bilateral lower extremity edema     severe chronic     Carotid artery occlusion     Cataract     CHF (congestive heart failure)     Coronary artery disease     subtotalled LAD with collateral    Depression     Fever blister     Hypothyroid     Iron deficiency anemia     Lumbar radiculopathy     with chronic pain    Ocular migraine     Renal disorder     Sleep apnea     cpap       Past Surgical History:   Procedure Laterality Date    ADENOIDECTOMY      BACK SURGERY      x 12    CARDIAC CATHETERIZATION  2016    subtotalled LAD with right to left collaterals    CATARACT EXTRACTION W/  INTRAOCULAR LENS IMPLANT Left     Dr Coleman     CYSTOSCOPIC LITHOLAPAXY N/A 6/27/2019    Procedure: CYSTOLITHOLAPAXY;  Surgeon: Shireen Mayo MD;  Location: [facility] OR 2ND FLR;  Service: Urology;  Laterality: N/A;    CYSTOSCOPIC LITHOLAPAXY N/A 9/3/2019    Procedure: CYSTOLITHOLAPAXY;  Surgeon: Shireen Mayo MD;  Location: [facility] OR 2ND FLR;  Service: Urology;  Laterality: N/A;    CYSTOSCOPY N/A 7/13/2021    Procedure: CYSTOSCOPY;  Surgeon: Shireen Mayo MD;  Location: [facility] OR 1ST FLR;  Service: Urology;  Laterality: N/A;    CYSTOSCOPY  11/16/2021    Procedure: CYSTOSCOPY;  Surgeon: Shireen Mayo MD;  Location: [facility] OR 1ST FLR;  Service: Urology;;    ESOPHAGOGASTRODUODENOSCOPY N/A 5/23/2018    Procedure: ESOPHAGOGASTRODUODENOSCOPY (EGD);  Surgeon: Prince Vance MD;  Location: [facility] (4TH FLR);  Service: Endoscopy;  Laterality: N/A;  r/s 'd per Dr. Vance due to family emergency- ER    HYSTERECTOMY  1975    endometriosis    INJECTION OF BOTULINUM TOXIN TYPE A  7/13/2021    Procedure: INJECTION, BOTULINUM TOXIN, 200units;  Surgeon: Shireen Mayo MD;  Location: [facility] OR 1ST FLR;  Service: Urology;;    INJECTION OF BOTULINUM TOXIN TYPE A  11/16/2021    Procedure: INJECTION, BOTULINUM TOXIN, 200units;  Surgeon: Shireen Mayo MD;  Location: [facility] OR 1ST FLR;  Service: Urology;;    pain pump placement      SQ Dilaudid Pump managed by   [facility], Pain Management    REPLACEMENT OF CATHETER N/A 10/31/2019    Procedure: REPLACEMENT, CATHETER-SUPRAPUBIC;  Surgeon: Shireen Mayo MD;  Location: [facility] OR [address] [place] [address];  Service: Urology;  Laterality: N/A;    SPINAL CORD STIMULATOR REMOVAL      before [place]    SPINE SURGERY  5-13-13    CERVICAL FUSION    TONSILLECTOMY         Review of patient's allergies indicates:   Allergen Reactions    (d)-limonene flavor      Other reaction(s): difficult intubation  Other reaction(s): Difficulty breathing    Bactrim [sulfamethoxazole-trimethoprim] Anaphylaxis    Benadryl [diphenhydramine hcl] Shortness Of Breath    Fentanyl Itching, Nausea And Vomiting and Swelling             Imitrex [sumatriptan succinate] Shortness Of Breath    Topamax [topiramate] Shortness Of Breath    Vancomycin Shortness Of Breath     Rash    Butorphanol tartrate     Darvocet a500 [propoxyphene n-acetaminophen]      Other reaction(s): Difficulty breathing    White petrolatum-zinc     Zinc oxide-white petrolatum      Other reaction(s): Difficulty breathing    Latex, natural rubber Itching and Rash    Phenytoin Rash and Other (See Comments)     Trouble breathing       No current facility-administered medications on file prior to encounter.     Current Outpatient Medications on File Prior to Encounter   Medication Sig    aspirin (ECOTRIN) 81 MG EC tablet Take 1 tablet (81 mg total) by mouth once daily.    atorvastatin (LIPITOR) 80 MG tablet TAKE ONE TABLET BY MOUTH EVERY DAY    butalbital-acetaminophen-caffeine -40 mg (FIORICET, ESGIC) -40 mg per tablet Take 1 tablet by mouth daily as needed.    FLUoxetine 20 MG capsule Take 3 capsules (60 mg total) by mouth once daily.    intrathecal pain pump compound Hydromorphone (7.5 mg/mL) infusion at 3.6799 mg/day (0.1533 mg/hr) out of a total reservoir volume of 37.3 mL  Pump filled every 2 months    levothyroxine (SYNTHROID) 125 MCG tablet Take 1 tablet (125 mcg total) by  mouth before breakfast.    lidocaine (LIDODERM) 5 % daily as needed.     nitroGLYCERIN (NITROSTAT) 0.4 MG SL tablet Place 1 tablet (0.4 mg total) under the tongue every 5 (five) minutes as needed for Chest pain.    ondansetron (ZOFRAN-ODT) 4 MG TbDL Take 1 tablet (4 mg total) by mouth every 8 (eight) hours as needed (nausea).    oxybutynin (DITROPAN XL) 15 MG TR24 Take 1 tablet (15 mg total) by mouth once daily.    pantoprazole (PROTONIX) 40 MG tablet TAKE ONE TABLET BY MOUTH EVERY DAY    potassium chloride (MICRO-K) 10 MEQ CpSR TAKE TWO CAPSULES BY MOUTH EVERY DAY    promethazine (PHENERGAN) 25 MG tablet Take 25 mg by mouth every 6 (six) hours as needed for Nausea.    QUEtiapine (SEROQUEL) 100 MG Tab Take 2 tablets (200 mg total) by mouth nightly.    QUEtiapine (SEROQUEL) 25 MG Tab Take 12.5-25 mg twice daily as needed for anxiety    senna (SENNA LAX) 8.6 mg tablet Take 2 tablets by mouth 2 (two) times daily.    torsemide (DEMADEX) 100 MG Tab TAKE ONE TABLET BY MOUTH EVERY DAY    traZODone (DESYREL) 100 MG tablet Take 3 tablets (300 mg total) by mouth every evening.     Family History       Problem Relation (Age of Onset)    Cancer Mother (55), Father    Cirrhosis Paternal Aunt, Paternal Uncle    Colon cancer Maternal Uncle    Esophageal cancer Father    Heart disease Maternal Uncle    Liver disease Paternal Aunt, Paternal Uncle    No Known Problems Brother, Brother, Sister, Maternal Aunt, Maternal Grandfather, Paternal Grandmother, Paternal Grandfather    Parkinsonism Maternal Grandmother    Tremor Maternal Grandmother          Tobacco Use    Smoking status: Never Smoker    Smokeless tobacco: Never Used   Substance and Sexual Activity    Alcohol use: Never    Drug use: No    Sexual activity: Never     Partners: Male     Review of Systems   Constitutional:  Positive for appetite change and fatigue. Negative for unexpected weight change.   HENT:  Negative for congestion, dental problem and trouble  swallowing.    Eyes:  Negative for redness and visual disturbance.   Respiratory:  Negative for cough and shortness of breath.    Gastrointestinal:  Negative for abdominal distention, abdominal pain, nausea and vomiting.   Endocrine: Negative for polydipsia and polyuria.   Genitourinary:  Positive for difficulty urinating.   Musculoskeletal:  Positive for back pain. Negative for gait problem and neck pain.   Skin:  Negative for color change and rash.   Allergic/Immunologic: Negative for food allergies.   Neurological:  Positive for weakness.   Psychiatric/Behavioral:  Negative for agitation.    Objective:     Vital Signs (Most Recent):  Temp: 98 °F (36.7 °C) (04/14/22 1433)  Pulse: (!) 53 (04/14/22 2021)  Resp: 13 (04/14/22 2021)  BP: (!) 109/55 (04/14/22 1433)  SpO2: (!) 92 % (04/14/22 2021)   Vital Signs (24h Range):  Temp:  [98 °F (36.7 °C)] 98 °F (36.7 °C)  Pulse:  [44-82] 53  Resp:  [13-15] 13  SpO2:  [92 %-97 %] 92 %  BP: (109)/(55) 109/55     Weight: 82.1 kg (181 lb)  Body mass index is 31.07 kg/m².    Physical Exam  Constitutional:       Appearance: She is ill-appearing.   HENT:      Head: Atraumatic.      Nose: Nose normal.      Mouth/Throat:      Mouth: Mucous membranes are moist.   Eyes:      Pupils: Pupils are equal, round, and reactive to light.   Cardiovascular:      Rate and Rhythm: Bradycardia present.      Pulses: Normal pulses.   Pulmonary:      Effort: Pulmonary effort is normal.   Abdominal:      General: Abdomen is flat. Bowel sounds are normal.      Palpations: Abdomen is soft.   Musculoskeletal:         General: Normal range of motion.      Cervical back: Normal range of motion.   Skin:     General: Skin is warm and dry.      Capillary Refill: Capillary refill takes less than 2 seconds.   Neurological:      Mental Status: She is alert and oriented to person, place, and time.   Psychiatric:         Mood and Affect: Mood normal.         CRANIAL NERVES     CN III, IV, VI   Pupils are equal,  round, and reactive to light.     Significant Labs: All pertinent labs within the past 24 hours have been reviewed.  Recent Lab Results         04/14/22  1713   04/14/22  1540   04/14/22  1501        Benzodiazepines Negative           Methadone metabolites Negative           Phencyclidine Negative           Albumin   3.4         Alcohol, Serum   <10         Alkaline Phosphatase   76         ALT   9         Amphetamine Screen, Ur Negative           Anion Gap   11         Appearance, UA Clear           AST   14  Comment: Specimen slightly hemolyzed         Bacteria, UA Rare           Barbiturate Screen, Ur Negative           Baso #   0.03         Basophil %   0.5         Bilirubin (UA) Negative           BILIRUBIN TOTAL   0.6  Comment: For infants and newborns, interpretation of results should be based  on gestational age, weight and in agreement with clinical  observations.    Premature Infant recommended reference ranges:  Up to 24 hours.............<8.0 mg/dL  Up to 48 hours............<12.0 mg/dL  3-5 days..................<15.0 mg/dL  6-29 days.................<15.0 mg/dL           BNP   94  Comment: Values of less than 100 pg/ml are consistent with non-CHF populations.         BUN   21         Calcium   9.0         Chloride   99         CO2   33         Cocaine (Metab.) Negative           Color, UA Yellow           CPK   35         Creatinine   2.8         Creatinine, Urine 109.2           Differential Method   Automated         eGFR if    20         eGFR if non    17  Comment: Calculation used to obtain the estimated glomerular filtration  rate (eGFR) is the CKD-EPI equation.            Eos #   0.1         Eosinophil %   1.3         Glucose   98         Glucose, UA Negative           Gran # (ANC)   4.1         Gran %   75.0         Hematocrit   29.8         Hemoglobin   9.6         Immature Grans (Abs)   0.01  Comment: Mild elevation in immature granulocytes is non specific and    can be seen in a variety of conditions including stress response,   acute inflammation, trauma and pregnancy. Correlation with other   laboratory and clinical findings is essential.           Immature Granulocytes   0.2         Ketones, UA Negative           Leukocytes, UA 3+           Lipase   22         Lymph #   0.9         Lymph %   16.4         Magnesium   2.0         MCH   28.9         MCHC   32.2         MCV   90         Microscopic Comment SEE COMMENT  Comment: Other formed elements not mentioned in the report are not   present in the microscopic examination.              Mono #   0.4         Mono %   6.6         MPV   10.1         NITRITE UA Positive           nRBC   0         Occult Blood UA Trace           Opiate Scrn, Ur Presumptive Positive           pH, UA 8.0           Platelets   240         POCT Glucose     93       Potassium   3.0         PROTEIN TOTAL   6.9         Protein, UA Trace  Comment: Recommend a 24 hour urine protein or a urine   protein/creatinine ratio if globulin induced proteinuria is  clinically suspected.             RBC   3.32         RBC, UA 1           RDW   15.1         Sodium   143         Specific [place], UA 1.015           Specimen UA Urine, Catheterized           Squam Epithel, UA 0           Marijuana (THC) Metabolite Negative           Toxicology Information SEE COMMENT  Comment: This screen includes the following classes of drugs at the listed   cut-off:    Benzodiazepines 200 ng/ml  Methadone 300 ng/ml  Cocaine metabolite 300 ng/ml  Opiates 300 ng/ml  Barbiturates 200 ng/ml  Amphetamines 1000 ng/ml  Marijuana metabs (THC) 50 ng/ml  Phencyclidine (PCP) 25 ng/ml    This is a screening test. If results do not correlate with clinical   presentation, then a confirmatory send out test is advised.     This report is intended for use in clinical monitoring and management   of   patients. It is not intended for use in employment related drug   testing.             Troponin I    <0.006  Comment: The reference interval for Troponin I represents the 99th percentile   cutoff   for our facility and is consistent with 3rd generation assay   performance.           UROBILINOGEN UA Negative           WBC, UA 78           WBC   5.48                 Significant Imaging: I have reviewed all pertinent imaging results/findings within the past 24 hours.    Assessment/Plan:     Abdominal pain  Generalized Body Aches   Chronic lower back pain  Chronic pain syndrome    - PPI  - Maalox PRN  -Continue current regimen and monitor closely.    Anxiety    - resume nightly Trazodone   - resume Seroquel    Urinary tract infection associated with cystostomy catheter  - Chronic and recurrent UTIs  - Followed by ID and urology outpatient  - UA Leukocytes 3+  - Rocephin initiated   -urine and blood cultures pending  - Consult urology for catheter change and evaluation      Bradycardia  - Chronic  - HR 45s-55s, asymptomatic  - Continuous telemetry monitoring       Primary hypothyroidism  - Chronic and stable  - Continue levothyroxine 125mcg po before breakfast      Frequent falls  - PT/OT eval and treat when stable  - Fall precautions  - reinforced to patient to call for assistance      Neurogenic bladder  - Chronic catheter  - Ditropan 15 mg po daily    CHF (congestive heart failure)  - Denies shortness of breath  - Chronic BLE, bedrest  - CHF pathways  - monitor pulse ox    7/7/2021 TTE    · The left ventricle is normal in size with normal systolic function.  · The estimated ejection fraction is 55%.  · Normal left ventricular diastolic function.  · Mild tricuspid regurgitation.  · Normal right ventricular size with normal right ventricular systolic function.  · The estimated PA systolic pressure is 47 mmHg.  · There is pulmonary hypertension.        Major depressive disorder, recurrent, mild  - Chronic and stable  - Continue chronic SSRI.    Sleep apnea  - CPAP every night        VTE Risk Mitigation (From admission,  onward)    None             Stephany Shermans, NP  Department of Hospital Medicine   [place] [facility]

## 2022-04-15 NOTE — PLAN OF CARE
Problem: Occupational Therapy  Goal: Occupational Therapy Goal  Description: Goals to be met by: 4/30/2022    Patient will increase functional independence with ADLs by performing:    UE Dressing with Modified [place].  LE Dressing with Minimal Assistance.  Grooming while seated with Modified [place].  Toileting from bedside commode with Stand-by Assistance for hygiene and clothing management.   Supine to sit with Modified [place].  Step transfer with Stand-by Assistance  Toilet transfer to bedside commode with Stand-by Assistance.  Increased functional strength to WFL for ADLS.  Upper extremity exercise program x 10 reps per handout, with supervision.    Outcome: Ongoing, Progressing   OT initial eval completed and tx initiated.  OT recommending HHOT at [place].

## 2022-04-15 NOTE — ASSESSMENT & PLAN NOTE
- baseline serum Cr 1.1, 2.8 on admit and 5.9 1 week ago  - appears to be slowly improving; UA c/w infection but catheter needs to be changed  - check renal US  - continue to monitor renal function with daily labs   - avoid nephrotoxins  - renally dose all medications   - monitor events that may lead to decreased renal perfusion (hypovolemia, hypotension, sepsis)  - monitor urine output to assure that no obstruction precipitates worsening in GFR

## 2022-04-15 NOTE — HPI
Tasha Hawley is a 67 yo female with a past medical history of Depression, Anemia, Congestive heart failure, Renal disorder, Lumbar radiculopathy, Sleep apnea, Neurogenic bladder in which she has a suprapubic catheter, recurrent UTI, Chronic pain with epidural pain pump, Coronary artery disease, Hypothyroidism, and Sleep apnea. Her PCP is Dr. Mesfin Hodges. She is established with Dr. Karla Amezquita for Nephrology. Dr. Mayo is her Urologist. She has home health with [facility] . She presented to the ED at [facility] via EMS with a cc of generalized body aches for the past 3-4 days. Associated symptoms include poor oral intake, red and orange colored urine, back pain and nausea. She also reports constipation. She denies fever, diarrhea, headaches, or blurred vision.     ED workup revealed WBC UA 78, UA Nitrate +, UA Leukocytes 3+, Hgb 9.6, Hct 29.8, K 3.0, CO2 33, Cr 2.8, Albumin 3.4, ALT 9, Drug screen + Opiate, EKG SB, rate 47, CXR Chronic appearing interstitial findings, no large focal consolidation. IV Rocephin initiated. Urology consulted. Admitted to Hospital medicine for further evaluation and treatment.

## 2022-04-15 NOTE — PROGRESS NOTES
[facility] Medicine  Progress Note    Patient Name: Tasha Hawley  MRN: [id number]  Patient Class: OP- Observation   Admission Date: 4/14/2022  Length of Stay: 0 days  Attending Physician: Nic Mistry, *  Primary Care Provider: Mesfin Hodges Ii, MD        Subjective:     Principal Problem:CARITO (acute kidney injury)        HPI:  Tasha Hawley is a 65 yo female with a past medical history of Depression, Anemia, Congestive heart failure, Renal disorder, Lumbar radiculopathy, Sleep apnea, Neurogenic bladder in which she has a suprapubic catheter, recurrent UTI, Chronic pain with epidural pain pump, Coronary artery disease, Hypothyroidism, and Sleep apnea. Her PCP is Dr. Mesfin Hodges. She is established with Dr. Karla Amezquita for Nephrology. Dr. Mayo is her Urologist. She has home health with [facility] . She presented to the ED at [facility] via EMS with a cc of generalized body aches for the past 3-4 days. Associated symptoms include poor oral intake, red and orange colored urine, back pain and nausea. She also reports constipation. She denies fever, diarrhea, headaches, or blurred vision.     ED workup revealed WBC UA 78, UA Nitrate +, UA Leukocytes 3+, Hgb 9.6, Hct 29.8, K 3.0, CO2 33, Cr 2.8, Albumin 3.4, ALT 9, Drug screen + Opiate, EKG SB, rate 47, CXR Chronic appearing interstitial findings, no large focal consolidation. IV Rocephin initiated. Urology consulted. Admitted to Hospital medicine for further evaluation and treatment.       Overview/Hospital Course:  No notes on file    Interval History:  Reports some continued abdominal discomfort, but overall feeling a little better today.  Reports occasional episodes of chills, but no fever associated with this.  She is wearing multiple layers of blankets.  She reports receiving frequent UTIs related to her suprapubic catheter.  Facial droop is chronic.    Review of Systems   Constitutional:  Positive for chills. Negative for fever.    Respiratory:  Negative for shortness of breath.    Cardiovascular:  Positive for leg swelling. Negative for chest pain.   Gastrointestinal:  Positive for abdominal pain.   Genitourinary:  Positive for dysuria.   Neurological:  Positive for weakness.   Objective:     Vital Signs (Most Recent):  Temp: 97.9 °F (36.6 °C) (04/15/22 0823)  Pulse: (!) 51 (04/15/22 0823)  Resp: 18 (04/15/22 0823)  BP: (!) 103/54 (04/15/22 0823)  SpO2: 95 % (04/15/22 0823)   Vital Signs (24h Range):  Temp:  [96.7 °F (35.9 °C)-98 °F (36.7 °C)] 97.9 °F (36.6 °C)  Pulse:  [44-82] 51  Resp:  [13-18] 18  SpO2:  [90 %-97 %] 95 %  BP: ()/(47-58) 103/54     Weight: 86.8 kg (191 lb 5.8 oz)  Body mass index is 32.85 kg/m².    Intake/Output Summary (Last 24 hours) at 4/15/2022 1045  Last data filed at 4/15/2022 0600  Gross per 24 hour   Intake 250 ml   Output 400 ml   Net -150 ml      Physical Exam  Constitutional:       Appearance: She is ill-appearing.   HENT:      Head: Atraumatic.      Nose: Nose normal.      Mouth/Throat:      Mouth: Mucous membranes are moist.   Eyes:      Pupils: Pupils are equal, round, and reactive to light.   Cardiovascular:      Rate and Rhythm: Bradycardia present.      Pulses: Normal pulses.   Pulmonary:      Effort: Pulmonary effort is normal.   Abdominal:      General: Abdomen is flat. Bowel sounds are normal.      Palpations: Abdomen is soft.      Comments: Suprapubic catheter   Musculoskeletal:         General: Normal range of motion.      Cervical back: Normal range of motion.      Right lower leg: Edema present.      Left lower leg: Edema present.   Skin:     General: Skin is warm and dry.      Capillary Refill: Capillary refill takes less than 2 seconds.   Neurological:      Mental Status: She is alert and oriented to person, place, and time.      Comments: Right facial droop, chronic   Psychiatric:         Mood and Affect: Mood normal.       Significant Labs: All pertinent labs within the past 24 hours have  been reviewed.    Significant Imaging: I have reviewed all pertinent imaging results/findings within the past 24 hours.      Assessment/Plan:      * CARITO (acute kidney injury)  - baseline serum Cr 1.1, 2.8 on admit and 5.9 1 week ago  - appears to be slowly improving; UA c/w infection but catheter needs to be changed  - check renal US  - continue to monitor renal function with daily labs   - avoid nephrotoxins  - renally dose all medications   - monitor events that may lead to decreased renal perfusion (hypovolemia, hypotension, sepsis)  - monitor urine output to assure that no obstruction precipitates worsening in GFR    Abdominal pain  - likely 2/2 UTI  - improving  - will need suprapubic catheter exchanged; urology was consulted    Anxiety  - resume nightly Trazodone   - resume Seroquel    Urinary tract infection associated with cystostomy catheter  - Chronic and recurrent UTIs  - Followed by ID and urology outpatient  - UA Leukocytes 3+  - Rocephin initiated; switch to cefepime given hx of pseudomonas  -urine and blood cultures pending  - Consult urology for catheter change and evaluation      Bradycardia  - Chronic  - HR 45s-55s, asymptomatic  - Continuous telemetry monitoring       Primary hypothyroidism  - Chronic and stable  - Continue levothyroxine 125mcg po before breakfast      Frequent falls  - PT/OT eval and treat  - Fall precautions  - reinforced to patient to call for assistance      Neurogenic bladder  - Chronic catheter  - Ditropan 15 mg po daily    CHF (congestive heart failure)  - Denies shortness of breath  - Chronic BLE, bedrest  - CHF pathways  - monitor pulse ox    7/7/2021 TTE    · The left ventricle is normal in size with normal systolic function.  · The estimated ejection fraction is 55%.  · Normal left ventricular diastolic function.  · Mild tricuspid regurgitation.  · Normal right ventricular size with normal right ventricular systolic function.  · The estimated PA systolic pressure is 47  mmHg.  · There is pulmonary hypertension.        Major depressive disorder, recurrent, mild  - Chronic and stable  - Continue chronic SSRI.    Sleep apnea  - CPAP every night        VTE Risk Mitigation (From admission, onward)    None          Discharge Planning   JACKIE:      Code Status: Full Code   Is the patient medically ready for discharge?:     Reason for patient still in hospital (select all that apply): Patient trending condition, Treatment, Consult recommendations and PT / OT recommendations                     Nic Mistry MD  Department of Hospital Medicine   [place] [facility]

## 2022-04-15 NOTE — CARE UPDATE
Notified of patient heart rate 42-46, asymptomatic. Chart reviewed. Patient with chronic bradycardia. Not on CCB or BB. Continue to monitor.

## 2022-04-15 NOTE — PLAN OF CARE
Problem: Adult Inpatient Plan of Care  Goal: Plan of Care Review  Outcome: Ongoing, Progressing   POC reviewed with pt. Pt verbalizes understanding of plan of care. Pt remained free of injury/trauma throughout the shift. Safety precautions maintained. Bedrails upx2, bed in lowest position and locked. Call bell in reach at all times. Pt up to the side of bed with PT.  Pt c/o pain and was medicated with PRN pain meds. Pt able to reposition self in bed.  No acute events throughout the shift.

## 2022-04-15 NOTE — ASSESSMENT & PLAN NOTE
Generalized Body Aches   Chronic lower back pain  Chronic pain syndrome    - PPI  - Maalox PRN  -Continue current regimen and monitor closely.

## 2022-04-15 NOTE — PROGRESS NOTES
04/14/22 2129   Provider Notification   Provider Notified? Yes   Name of Provider Stephany Shah NP   Provider Notification Method Secure Chat   Provider Notified Of Patient Request  (pain medication)   Admission   Initial VN Admission Questions Complete   Communication Issues? Patient Hearing   Shift   Virtual Nurse - Patient Verbalized Approval Of Camera Use;VN Rounding   Safety/Activity   Patient Rounds visualized patient   Safety Promotion/Fall Prevention instructed to call staff for mobility;Fall Risk reviewed with patient/family;side rails raised x 2   VIRTUAL NURSE: Admission questions completed with patient at this time via phone as pt is very [place].  at bedside.  Care plan and safety precautions reviewed. Pt requesting pain medication and apple juice at this time, bedside nurse and NP notified about requests. Instructed to use call light for assistance, she verbalized understanding. Will continue to monitor.

## 2022-04-15 NOTE — ASSESSMENT & PLAN NOTE
- Chronic and recurrent UTIs  - Followed by ID and urology outpatient  - UA Leukocytes 3+  - Rocephin initiated   -urine and blood cultures pending  - Consult urology for catheter change and evaluation

## 2022-04-15 NOTE — ASSESSMENT & PLAN NOTE
- Denies shortness of breath  - Chronic BLE, bedrest  - CHF pathways  - monitor pulse ox    7/7/2021 TTE    · The left ventricle is normal in size with normal systolic function.  · The estimated ejection fraction is 55%.  · Normal left ventricular diastolic function.  · Mild tricuspid regurgitation.  · Normal right ventricular size with normal right ventricular systolic function.  · The estimated PA systolic pressure is 47 mmHg.  · There is pulmonary hypertension.

## 2022-04-15 NOTE — ED NOTES
Rec'd report from ALPHONSE Lipscomb RN. Pt is in semi-fowlers position in bed w/o complaint at this time. Pt denies pain/discomfort. Pt is A & O x 3, denies SOB, respiratory distress and respirations are even and unlabored. Skin is warm and dry w/ pink mucosa. VS. Bed is locked and in the low position w/ the side rails up and locked for safety. Call bell @ BS. Will continue to monitor closely.

## 2022-04-15 NOTE — ASSESSMENT & PLAN NOTE
- PT/OT eval and treat when stable  - Fall precautions  - reinforced to patient to call for assistance

## 2022-04-15 NOTE — SUBJECTIVE & OBJECTIVE
Interval History:  Reports some continued abdominal discomfort, but overall feeling a little better today.  Reports occasional episodes of chills, but no fever associated with this.  She is wearing multiple layers of blankets.  She reports receiving frequent UTIs related to her suprapubic catheter.  Facial droop is chronic.    Review of Systems   Constitutional:  Positive for chills. Negative for fever.   Respiratory:  Negative for shortness of breath.    Cardiovascular:  Positive for leg swelling. Negative for chest pain.   Gastrointestinal:  Positive for abdominal pain.   Genitourinary:  Positive for dysuria.   Neurological:  Positive for weakness.   Objective:     Vital Signs (Most Recent):  Temp: 97.9 °F (36.6 °C) (04/15/22 0823)  Pulse: (!) 51 (04/15/22 0823)  Resp: 18 (04/15/22 0823)  BP: (!) 103/54 (04/15/22 0823)  SpO2: 95 % (04/15/22 0823)   Vital Signs (24h Range):  Temp:  [96.7 °F (35.9 °C)-98 °F (36.7 °C)] 97.9 °F (36.6 °C)  Pulse:  [44-82] 51  Resp:  [13-18] 18  SpO2:  [90 %-97 %] 95 %  BP: ()/(47-58) 103/54     Weight: 86.8 kg (191 lb 5.8 oz)  Body mass index is 32.85 kg/m².    Intake/Output Summary (Last 24 hours) at 4/15/2022 1045  Last data filed at 4/15/2022 0600  Gross per 24 hour   Intake 250 ml   Output 400 ml   Net -150 ml      Physical Exam  Constitutional:       Appearance: She is ill-appearing.   HENT:      Head: Atraumatic.      Nose: Nose normal.      Mouth/Throat:      Mouth: Mucous membranes are moist.   Eyes:      Pupils: Pupils are equal, round, and reactive to light.   Cardiovascular:      Rate and Rhythm: Bradycardia present.      Pulses: Normal pulses.   Pulmonary:      Effort: Pulmonary effort is normal.   Abdominal:      General: Abdomen is flat. Bowel sounds are normal.      Palpations: Abdomen is soft.      Comments: Suprapubic catheter   Musculoskeletal:         General: Normal range of motion.      Cervical back: Normal range of motion.      Right lower leg: Edema  present.      Left lower leg: Edema present.   Skin:     General: Skin is warm and dry.      Capillary Refill: Capillary refill takes less than 2 seconds.   Neurological:      Mental Status: She is alert and oriented to person, place, and time.      Comments: Right facial droop, chronic   Psychiatric:         Mood and Affect: Mood normal.       Significant Labs: All pertinent labs within the past 24 hours have been reviewed.    Significant Imaging: I have reviewed all pertinent imaging results/findings within the past 24 hours.

## 2022-04-16 LAB
ANION GAP SERPL CALC-SCNC: 11 MMOL/L (ref 8–16)
BASOPHILS # BLD AUTO: 0.03 K/UL (ref 0–0.2)
BASOPHILS NFR BLD: 0.7 % (ref 0–1.9)
BUN SERPL-MCNC: 15 MG/DL (ref 8–23)
CALCIUM SERPL-MCNC: 9 MG/DL (ref 8.7–10.5)
CHLORIDE SERPL-SCNC: 106 MMOL/L (ref 95–110)
CO2 SERPL-SCNC: 24 MMOL/L (ref 23–29)
CREAT SERPL-MCNC: 1.9 MG/DL (ref 0.5–1.4)
DIFFERENTIAL METHOD: ABNORMAL
EOSINOPHIL # BLD AUTO: 0.2 K/UL (ref 0–0.5)
EOSINOPHIL NFR BLD: 5.3 % (ref 0–8)
ERYTHROCYTE [DISTWIDTH] IN BLOOD BY AUTOMATED COUNT: 15.3 % (ref 11.5–14.5)
EST. GFR  (AFRICAN AMERICAN): 31 ML/MIN/1.73 M^2
EST. GFR  (NON AFRICAN AMERICAN): 27 ML/MIN/1.73 M^2
GLUCOSE SERPL-MCNC: 79 MG/DL (ref 70–110)
HCT VFR BLD AUTO: 32.6 % (ref 37–48.5)
HGB BLD-MCNC: 9.7 G/DL (ref 12–16)
IMM GRANULOCYTES # BLD AUTO: 0.01 K/UL (ref 0–0.04)
IMM GRANULOCYTES NFR BLD AUTO: 0.2 % (ref 0–0.5)
LYMPHOCYTES # BLD AUTO: 1.4 K/UL (ref 1–4.8)
LYMPHOCYTES NFR BLD: 33 % (ref 18–48)
MCH RBC QN AUTO: 29.2 PG (ref 27–31)
MCHC RBC AUTO-ENTMCNC: 29.8 G/DL (ref 32–36)
MCV RBC AUTO: 98 FL (ref 82–98)
MONOCYTES # BLD AUTO: 0.4 K/UL (ref 0.3–1)
MONOCYTES NFR BLD: 9.6 % (ref 4–15)
NEUTROPHILS # BLD AUTO: 2.2 K/UL (ref 1.8–7.7)
NEUTROPHILS NFR BLD: 51.2 % (ref 38–73)
NRBC BLD-RTO: 0 /100 WBC
PLATELET # BLD AUTO: 178 K/UL (ref 150–450)
PLATELET BLD QL SMEAR: ABNORMAL
PMV BLD AUTO: 11 FL (ref 9.2–12.9)
POCT GLUCOSE: 111 MG/DL (ref 70–110)
POCT GLUCOSE: 112 MG/DL (ref 70–110)
POTASSIUM SERPL-SCNC: 4.3 MMOL/L (ref 3.5–5.1)
RBC # BLD AUTO: 3.32 M/UL (ref 4–5.4)
SODIUM SERPL-SCNC: 141 MMOL/L (ref 136–145)
WBC # BLD AUTO: 4.37 K/UL (ref 3.9–12.7)

## 2022-04-16 PROCEDURE — 63600175 PHARM REV CODE 636 W HCPCS: Performed by: HOSPITALIST

## 2022-04-16 PROCEDURE — G0378 HOSPITAL OBSERVATION PER HR: HCPCS

## 2022-04-16 PROCEDURE — 85025 COMPLETE CBC W/AUTO DIFF WBC: CPT | Performed by: HOSPITALIST

## 2022-04-16 PROCEDURE — 80048 BASIC METABOLIC PNL TOTAL CA: CPT | Performed by: HOSPITALIST

## 2022-04-16 PROCEDURE — 99900035 HC TECH TIME PER 15 MIN (STAT)

## 2022-04-16 PROCEDURE — 63600175 PHARM REV CODE 636 W HCPCS: Performed by: NURSE PRACTITIONER

## 2022-04-16 PROCEDURE — A4216 STERILE WATER/SALINE, 10 ML: HCPCS | Performed by: NURSE PRACTITIONER

## 2022-04-16 PROCEDURE — 25000003 PHARM REV CODE 250: Performed by: NURSE PRACTITIONER

## 2022-04-16 PROCEDURE — 36415 COLL VENOUS BLD VENIPUNCTURE: CPT | Performed by: HOSPITALIST

## 2022-04-16 PROCEDURE — 94761 N-INVAS EAR/PLS OXIMETRY MLT: CPT

## 2022-04-16 PROCEDURE — 25000003 PHARM REV CODE 250: Performed by: HOSPITALIST

## 2022-04-16 RX ORDER — ONDANSETRON 2 MG/ML
4 INJECTION INTRAMUSCULAR; INTRAVENOUS EVERY 6 HOURS PRN
Status: CANCELLED | OUTPATIENT
Start: 2022-04-16

## 2022-04-16 RX ADMIN — Medication 10 ML: at 01:04

## 2022-04-16 RX ADMIN — SENNOSIDES 2 TABLET: 8.6 TABLET, FILM COATED ORAL at 08:04

## 2022-04-16 RX ADMIN — CEFEPIME 1 G: 1 INJECTION, POWDER, FOR SOLUTION INTRAMUSCULAR; INTRAVENOUS at 12:04

## 2022-04-16 RX ADMIN — FLUOXETINE HYDROCHLORIDE 60 MG: 20 CAPSULE ORAL at 08:04

## 2022-04-16 RX ADMIN — QUETIAPINE FUMARATE 200 MG: 100 TABLET ORAL at 09:04

## 2022-04-16 RX ADMIN — OXYCODONE 5 MG: 5 TABLET ORAL at 08:04

## 2022-04-16 RX ADMIN — SENNOSIDES 2 TABLET: 8.6 TABLET, FILM COATED ORAL at 09:04

## 2022-04-16 RX ADMIN — OXYCODONE 5 MG: 5 TABLET ORAL at 01:04

## 2022-04-16 RX ADMIN — TRAZODONE HYDROCHLORIDE 300 MG: 100 TABLET ORAL at 09:04

## 2022-04-16 RX ADMIN — OXYBUTYNIN CHLORIDE 15 MG: 5 TABLET, EXTENDED RELEASE ORAL at 10:04

## 2022-04-16 RX ADMIN — TORSEMIDE 100 MG: 20 TABLET ORAL at 08:04

## 2022-04-16 RX ADMIN — OXYCODONE 5 MG: 5 TABLET ORAL at 05:04

## 2022-04-16 RX ADMIN — LEVOTHYROXINE SODIUM 125 MCG: 0.03 TABLET ORAL at 05:04

## 2022-04-16 RX ADMIN — ONDANSETRON 4 MG: 2 INJECTION INTRAMUSCULAR; INTRAVENOUS at 05:04

## 2022-04-16 RX ADMIN — ATORVASTATIN CALCIUM 80 MG: 40 TABLET, FILM COATED ORAL at 08:04

## 2022-04-16 RX ADMIN — PANTOPRAZOLE SODIUM 40 MG: 40 TABLET, DELAYED RELEASE ORAL at 08:04

## 2022-04-16 NOTE — PROGRESS NOTES
[facility] Medicine  Progress Note    Patient Name: Tasha Hawley  MRN: [id number]  Patient Class: OP- Observation   Admission Date: 4/14/2022  Length of Stay: 0 days  Attending Physician: Nic Mistry, *  Primary Care Provider: Mesfin Hodges Ii, MD        Subjective:     Principal Problem:CARITO (acute kidney injury)        HPI:  Tasha Hawley is a 65 yo female with a past medical history of Depression, Anemia, Congestive heart failure, Renal disorder, Lumbar radiculopathy, Sleep apnea, Neurogenic bladder in which she has a suprapubic catheter, recurrent UTI, Chronic pain with epidural pain pump, Coronary artery disease, Hypothyroidism, and Sleep apnea. Her PCP is Dr. Mesfin Hodges. She is established with Dr. Karla Amezquita for Nephrology. Dr. Mayo is her Urologist. She has home health with [facility] . She presented to the ED at [facility] via EMS with a cc of generalized body aches for the past 3-4 days. Associated symptoms include poor oral intake, red and orange colored urine, back pain and nausea. She also reports constipation. She denies fever, diarrhea, headaches, or blurred vision.     ED workup revealed WBC UA 78, UA Nitrate +, UA Leukocytes 3+, Hgb 9.6, Hct 29.8, K 3.0, CO2 33, Cr 2.8, Albumin 3.4, ALT 9, Drug screen + Opiate, EKG SB, rate 47, CXR Chronic appearing interstitial findings, no large focal consolidation. IV Rocephin initiated. Urology consulted. Admitted to Hospital medicine for further evaluation and treatment.       Overview/Hospital Course:  No notes on file    Interval History:  States that she still having bilateral flank pain and some abdominal discomfort as well.  No fevers or chills.  Overall little improved.    Review of Systems   Constitutional:  Negative for chills and fever.   Respiratory:  Negative for shortness of breath.    Cardiovascular:  Positive for leg swelling. Negative for chest pain.   Gastrointestinal:  Positive for abdominal pain.    Genitourinary:  Positive for dysuria.   Neurological:  Positive for weakness.   Objective:     Vital Signs (Most Recent):  Temp: 97.1 °F (36.2 °C) (04/16/22 0758)  Pulse: (!) 51 (04/16/22 0826)  Resp: 18 (04/16/22 0848)  BP: (!) 109/53 (04/16/22 0826)  SpO2: 98 % (04/16/22 0835)   Vital Signs (24h Range):  Temp:  [97.1 °F (36.2 °C)-98.5 °F (36.9 °C)] 97.1 °F (36.2 °C)  Pulse:  [50-80] 51  Resp:  [18-20] 18  SpO2:  [93 %-98 %] 98 %  BP: ()/(49-92) 109/53     Weight: 86.8 kg (191 lb 5.8 oz)  Body mass index is 32.85 kg/m².    Intake/Output Summary (Last 24 hours) at 4/16/2022 1108  Last data filed at 4/16/2022 1000  Gross per 24 hour   Intake --   Output 2300 ml   Net -2300 ml        Physical Exam  Constitutional:       Appearance: She is ill-appearing.   HENT:      Head: Atraumatic.      Nose: Nose normal.      Mouth/Throat:      Mouth: Mucous membranes are moist.   Eyes:      Pupils: Pupils are equal, round, and reactive to light.   Cardiovascular:      Rate and Rhythm: Bradycardia present.      Pulses: Normal pulses.   Pulmonary:      Effort: Pulmonary effort is normal.   Abdominal:      General: Abdomen is flat. Bowel sounds are normal.      Palpations: Abdomen is soft.      Comments: Suprapubic catheter   Musculoskeletal:         General: Normal range of motion.      Cervical back: Normal range of motion.      Right lower leg: Edema present.      Left lower leg: Edema present.   Skin:     General: Skin is warm and dry.      Capillary Refill: Capillary refill takes less than 2 seconds.   Neurological:      Mental Status: She is alert and oriented to person, place, and time.      Comments: Right facial droop, chronic   Psychiatric:         Mood and Affect: Mood normal.       Significant Labs: All pertinent labs within the past 24 hours have been reviewed.    Significant Imaging: I have reviewed all pertinent imaging results/findings within the past 24 hours.      Assessment/Plan:      * CARITO (acute kidney  injury)  - baseline serum Cr 1.1, 2.8 on admit and 5.9 1 week ago  - appears to be slowly improving; UA c/w infection but catheter needs to be changed  - renal US pending  - continue to monitor renal function with daily labs   - avoid nephrotoxins  - renally dose all medications   - monitor events that may lead to decreased renal perfusion (hypovolemia, hypotension, sepsis)  - monitor urine output to assure that no obstruction precipitates worsening in GFR    Abdominal pain  - likely 2/2 UTI  - improving  - will need suprapubic catheter exchanged; urology was consulted    Anxiety  - resume nightly Trazodone   - resume Seroquel    Urinary tract infection associated with cystostomy catheter  - Chronic and recurrent UTIs  - Followed by ID and urology outpatient  - UA Leukocytes 3+  - Rocephin initiated; switched to cefepime given hx of pseudomonas  -urine and blood cultures pending  - Consult urology for catheter change and evaluation, pending      Bradycardia  - Chronic  - HR 45s-55s, asymptomatic  - Continuous telemetry monitoring       Primary hypothyroidism  - Chronic and stable  - Continue levothyroxine 125mcg po before breakfast      Frequent falls  - PT/OT eval and treat  - Fall precautions  - reinforced to patient to call for assistance      Neurogenic bladder  - Chronic catheter  - Ditropan 15 mg po daily    CHF (congestive heart failure)  - Denies shortness of breath  - Chronic BLE, bedrest  - CHF pathways  - monitor pulse ox    7/7/2021 TTE    · The left ventricle is normal in size with normal systolic function.  · The estimated ejection fraction is 55%.  · Normal left ventricular diastolic function.  · Mild tricuspid regurgitation.  · Normal right ventricular size with normal right ventricular systolic function.  · The estimated PA systolic pressure is 47 mmHg.  · There is pulmonary hypertension.        Major depressive disorder, recurrent, mild  - Chronic and stable  - Continue chronic SSRI.    Sleep  apnea  - CPAP every night        VTE Risk Mitigation (From admission, onward)    None          Discharge Planning   JACKIE:      Code Status: Full Code   Is the patient medically ready for discharge?:     Reason for patient still in hospital (select all that apply): Patient trending condition, Consult recommendations and PT / OT recommendations                     Nic Mistry MD  Department of [facility] Medicine   [facility]

## 2022-04-16 NOTE — NURSING
Pt sitting up in bed, AAOx4, alert bands in place, VS taken, medications given per MAR, tele monitor in place. IV clean dry intact. Bed alarm on, bed locked and low, side rails up x 2, call bell within reach.  at bedside.

## 2022-04-16 NOTE — ASSESSMENT & PLAN NOTE
- baseline serum Cr 1.1, 2.8 on admit and 5.9 1 week ago  - appears to be slowly improving; UA c/w infection but catheter needs to be changed  - renal US pending  - continue to monitor renal function with daily labs   - avoid nephrotoxins  - renally dose all medications   - monitor events that may lead to decreased renal perfusion (hypovolemia, hypotension, sepsis)  - monitor urine output to assure that no obstruction precipitates worsening in GFR

## 2022-04-16 NOTE — ASSESSMENT & PLAN NOTE
- Chronic and recurrent UTIs  - Followed by ID and urology outpatient  - UA Leukocytes 3+  - Rocephin initiated; switched to cefepime given hx of pseudomonas  -urine and blood cultures pending  - Consult urology for catheter change and evaluation, pending

## 2022-04-16 NOTE — NURSING
Pt sitting up in bed, complained of nausea and pain. Notified provider, pain meds given per MAR. Nausea medication given IV per mar by charge MIRANDA Crocker RN. SCDs applied to pt. Bed in lowest position, call light within reach.

## 2022-04-16 NOTE — SUBJECTIVE & OBJECTIVE
Interval History:  States that she still having bilateral flank pain and some abdominal discomfort as well.  No fevers or chills.  Overall little improved.    Review of Systems   Constitutional:  Negative for chills and fever.   Respiratory:  Negative for shortness of breath.    Cardiovascular:  Positive for leg swelling. Negative for chest pain.   Gastrointestinal:  Positive for abdominal pain.   Genitourinary:  Positive for dysuria.   Neurological:  Positive for weakness.   Objective:     Vital Signs (Most Recent):  Temp: 97.1 °F (36.2 °C) (04/16/22 0758)  Pulse: (!) 51 (04/16/22 0826)  Resp: 18 (04/16/22 0848)  BP: (!) 109/53 (04/16/22 0826)  SpO2: 98 % (04/16/22 0835)   Vital Signs (24h Range):  Temp:  [97.1 °F (36.2 °C)-98.5 °F (36.9 °C)] 97.1 °F (36.2 °C)  Pulse:  [50-80] 51  Resp:  [18-20] 18  SpO2:  [93 %-98 %] 98 %  BP: ()/(49-92) 109/53     Weight: 86.8 kg (191 lb 5.8 oz)  Body mass index is 32.85 kg/m².    Intake/Output Summary (Last 24 hours) at 4/16/2022 1108  Last data filed at 4/16/2022 1000  Gross per 24 hour   Intake --   Output 2300 ml   Net -2300 ml        Physical Exam  Constitutional:       Appearance: She is ill-appearing.   HENT:      Head: Atraumatic.      Nose: Nose normal.      Mouth/Throat:      Mouth: Mucous membranes are moist.   Eyes:      Pupils: Pupils are equal, round, and reactive to light.   Cardiovascular:      Rate and Rhythm: Bradycardia present.      Pulses: Normal pulses.   Pulmonary:      Effort: Pulmonary effort is normal.   Abdominal:      General: Abdomen is flat. Bowel sounds are normal.      Palpations: Abdomen is soft.      Comments: Suprapubic catheter   Musculoskeletal:         General: Normal range of motion.      Cervical back: Normal range of motion.      Right lower leg: Edema present.      Left lower leg: Edema present.   Skin:     General: Skin is warm and dry.      Capillary Refill: Capillary refill takes less than 2 seconds.   Neurological:      Mental  Status: She is alert and oriented to person, place, and time.      Comments: Right facial droop, chronic   Psychiatric:         Mood and Affect: Mood normal.       Significant Labs: All pertinent labs within the past 24 hours have been reviewed.    Significant Imaging: I have reviewed all pertinent imaging results/findings within the past 24 hours.

## 2022-04-16 NOTE — PLAN OF CARE
Patient has asymptomatic borderline low BP; ok to give pain meds as these have been tolerated at these pressures previously.

## 2022-04-17 PROBLEM — A49.01 STAPH AUREUS INFECTION: Status: ACTIVE | Noted: 2022-04-17

## 2022-04-17 LAB
ANION GAP SERPL CALC-SCNC: 12 MMOL/L (ref 8–16)
BACTERIA UR CULT: ABNORMAL
BUN SERPL-MCNC: 16 MG/DL (ref 8–23)
CALCIUM SERPL-MCNC: 8.8 MG/DL (ref 8.7–10.5)
CHLORIDE SERPL-SCNC: 97 MMOL/L (ref 95–110)
CO2 SERPL-SCNC: 31 MMOL/L (ref 23–29)
CREAT SERPL-MCNC: 2 MG/DL (ref 0.5–1.4)
EST. GFR  (AFRICAN AMERICAN): 30 ML/MIN/1.73 M^2
EST. GFR  (NON AFRICAN AMERICAN): 26 ML/MIN/1.73 M^2
GLUCOSE SERPL-MCNC: 88 MG/DL (ref 70–110)
POTASSIUM SERPL-SCNC: 3.8 MMOL/L (ref 3.5–5.1)
SODIUM SERPL-SCNC: 140 MMOL/L (ref 136–145)

## 2022-04-17 PROCEDURE — 99900035 HC TECH TIME PER 15 MIN (STAT)

## 2022-04-17 PROCEDURE — 80048 BASIC METABOLIC PNL TOTAL CA: CPT | Performed by: HOSPITALIST

## 2022-04-17 PROCEDURE — 94760 N-INVAS EAR/PLS OXIMETRY 1: CPT

## 2022-04-17 PROCEDURE — 25000003 PHARM REV CODE 250: Performed by: HOSPITALIST

## 2022-04-17 PROCEDURE — 63600175 PHARM REV CODE 636 W HCPCS: Performed by: INTERNAL MEDICINE

## 2022-04-17 PROCEDURE — 25000003 PHARM REV CODE 250: Performed by: INTERNAL MEDICINE

## 2022-04-17 PROCEDURE — G0378 HOSPITAL OBSERVATION PER HR: HCPCS

## 2022-04-17 PROCEDURE — 63600175 PHARM REV CODE 636 W HCPCS: Performed by: HOSPITALIST

## 2022-04-17 PROCEDURE — 36415 COLL VENOUS BLD VENIPUNCTURE: CPT | Performed by: HOSPITALIST

## 2022-04-17 PROCEDURE — 25000003 PHARM REV CODE 250: Performed by: NURSE PRACTITIONER

## 2022-04-17 RX ORDER — FUROSEMIDE 10 MG/ML
60 INJECTION INTRAMUSCULAR; INTRAVENOUS ONCE
Status: DISCONTINUED | OUTPATIENT
Start: 2022-04-17 | End: 2022-04-17

## 2022-04-17 RX ORDER — TORSEMIDE 20 MG/1
100 TABLET ORAL DAILY
Status: DISCONTINUED | OUTPATIENT
Start: 2022-04-17 | End: 2022-04-19

## 2022-04-17 RX ORDER — GABAPENTIN 100 MG/1
100 CAPSULE ORAL NIGHTLY
Status: DISCONTINUED | OUTPATIENT
Start: 2022-04-17 | End: 2022-04-25 | Stop reason: HOSPADM

## 2022-04-17 RX ORDER — FUROSEMIDE 10 MG/ML
60 INJECTION INTRAMUSCULAR; INTRAVENOUS ONCE
Status: COMPLETED | OUTPATIENT
Start: 2022-04-17 | End: 2022-04-17

## 2022-04-17 RX ORDER — LIDOCAINE 50 MG/G
1 PATCH TOPICAL
Status: DISCONTINUED | OUTPATIENT
Start: 2022-04-17 | End: 2022-04-25 | Stop reason: HOSPADM

## 2022-04-17 RX ORDER — LINEZOLID 600 MG/1
600 TABLET, FILM COATED ORAL EVERY 12 HOURS
Status: DISCONTINUED | OUTPATIENT
Start: 2022-04-17 | End: 2022-04-17

## 2022-04-17 RX ORDER — CEFAZOLIN SODIUM 2 G/50ML
2 SOLUTION INTRAVENOUS
Status: DISCONTINUED | OUTPATIENT
Start: 2022-04-17 | End: 2022-04-19

## 2022-04-17 RX ORDER — TORSEMIDE 20 MG/1
100 TABLET ORAL DAILY
Status: DISCONTINUED | OUTPATIENT
Start: 2022-04-18 | End: 2022-04-17

## 2022-04-17 RX ADMIN — OXYCODONE 5 MG: 5 TABLET ORAL at 01:04

## 2022-04-17 RX ADMIN — SENNOSIDES 2 TABLET: 8.6 TABLET, FILM COATED ORAL at 08:04

## 2022-04-17 RX ADMIN — GABAPENTIN 100 MG: 100 CAPSULE ORAL at 09:04

## 2022-04-17 RX ADMIN — ATORVASTATIN CALCIUM 80 MG: 40 TABLET, FILM COATED ORAL at 08:04

## 2022-04-17 RX ADMIN — LIDOCAINE 1 PATCH: 50 PATCH CUTANEOUS at 03:04

## 2022-04-17 RX ADMIN — GABAPENTIN 100 MG: 100 CAPSULE ORAL at 08:04

## 2022-04-17 RX ADMIN — QUETIAPINE FUMARATE 200 MG: 100 TABLET ORAL at 08:04

## 2022-04-17 RX ADMIN — LINEZOLID 600 MG: 600 TABLET, FILM COATED ORAL at 09:04

## 2022-04-17 RX ADMIN — TRAZODONE HYDROCHLORIDE 300 MG: 100 TABLET ORAL at 08:04

## 2022-04-17 RX ADMIN — ACETAMINOPHEN 650 MG: 325 TABLET ORAL at 08:04

## 2022-04-17 RX ADMIN — FLUOXETINE HYDROCHLORIDE 60 MG: 20 CAPSULE ORAL at 08:04

## 2022-04-17 RX ADMIN — OXYBUTYNIN CHLORIDE 15 MG: 5 TABLET, EXTENDED RELEASE ORAL at 08:04

## 2022-04-17 RX ADMIN — OXYCODONE 5 MG: 5 TABLET ORAL at 08:04

## 2022-04-17 RX ADMIN — CEFAZOLIN SODIUM 2 G: 2 SOLUTION INTRAVENOUS at 12:04

## 2022-04-17 RX ADMIN — CEFAZOLIN SODIUM 2 G: 2 SOLUTION INTRAVENOUS at 11:04

## 2022-04-17 RX ADMIN — PANTOPRAZOLE SODIUM 40 MG: 40 TABLET, DELAYED RELEASE ORAL at 08:04

## 2022-04-17 RX ADMIN — FUROSEMIDE 60 MG: 10 INJECTION, SOLUTION INTRAMUSCULAR; INTRAVENOUS at 02:04

## 2022-04-17 RX ADMIN — LEVOTHYROXINE SODIUM 125 MCG: 0.03 TABLET ORAL at 05:04

## 2022-04-17 RX ADMIN — TORSEMIDE 100 MG: 20 TABLET ORAL at 09:04

## 2022-04-17 NOTE — ASSESSMENT & PLAN NOTE
- PT/OT eval and treat: HH at discharge  - Fall precautions  - reinforced to patient to call for assistance

## 2022-04-17 NOTE — NURSING
Transferred pt back to bed. Pt complained of pain 8 out of 10 on pain scale. VS obtained BP 96/53, Hr 60. Pt schedule to receive lasix IV and requesting oxy. Verify with Dr. Mistry if pt is okay to receive both with low bp. Dr. Mistry instructed to only give lasix. Prescribed lidocaine patch. Call bell within reach, bed in lowest position.

## 2022-04-17 NOTE — NURSING
0322: Flores in monitor room notified patient being removed from cardiac monitor at this time per MD order. Discontinue telemetry order placed in Epic on 04/16 at 0728 hours by Dr Mistry. See order history.     Susi Perry RN

## 2022-04-17 NOTE — ASSESSMENT & PLAN NOTE
- baseline serum Cr 1.1, 2.8 on admit and 5.9 1 week ago  - appears to be slowly improving; UA c/w infection but catheter needs to be changed  - renal US without hydro  - continue to monitor renal function with daily labs   - avoid nephrotoxins  - renally dose all medications   - monitor events that may lead to decreased renal perfusion (hypovolemia, hypotension, sepsis)  - monitor urine output to assure that no obstruction precipitates worsening in GFR

## 2022-04-17 NOTE — ASSESSMENT & PLAN NOTE
- associated with suprapubic catheter  - awaiting sensitivities  - allergy to vancomycin, will treat with zyvox for now  - ID consult

## 2022-04-17 NOTE — NURSING
Pt sitting up in bed, gave meds per mar, transferred pt to chair to eat breakfast. Call bell within reach, possesions within reach.

## 2022-04-17 NOTE — SUBJECTIVE & OBJECTIVE
Interval History:  still with flank pain; reports significant bilateral LE burning/numb pain today. Discussed possible gabapentin. Cultures growing staph, awaiting sensitivities; switch to zyvox given vanc allergy.    Review of Systems   Constitutional:  Negative for chills and fever.   Respiratory:  Negative for shortness of breath.    Cardiovascular:  Positive for leg swelling. Negative for chest pain.   Gastrointestinal:  Positive for abdominal pain.   Genitourinary:  Positive for dysuria.   Neurological:  Positive for weakness.   Objective:     Vital Signs (Most Recent):  Temp: 97.8 °F (36.6 °C) (04/17/22 0832)  Pulse: (!) 55 (04/17/22 0849)  Resp: 18 (04/17/22 0832)  BP: (!) 100/55 (04/17/22 0849)  SpO2: 95 % (04/17/22 0849)   Vital Signs (24h Range):  Temp:  [96.2 °F (35.7 °C)-98.6 °F (37 °C)] 97.8 °F (36.6 °C)  Pulse:  [46-98] 55  Resp:  [16-18] 18  SpO2:  [94 %-100 %] 95 %  BP: ()/(44-70) 100/55     Weight: 86.8 kg (191 lb 5.8 oz)  Body mass index is 32.85 kg/m².    Intake/Output Summary (Last 24 hours) at 4/17/2022 1036  Last data filed at 4/17/2022 0538  Gross per 24 hour   Intake 600 ml   Output 3750 ml   Net -3150 ml        Physical Exam  Constitutional:       Appearance: She is ill-appearing.   HENT:      Head: Atraumatic.      Nose: Nose normal.      Mouth/Throat:      Mouth: Mucous membranes are moist.   Eyes:      Pupils: Pupils are equal, round, and reactive to light.   Cardiovascular:      Rate and Rhythm: Bradycardia present.      Pulses: Normal pulses.   Pulmonary:      Effort: Pulmonary effort is normal.   Abdominal:      General: Abdomen is flat. Bowel sounds are normal.      Palpations: Abdomen is soft.      Tenderness: There is right CVA tenderness and left CVA tenderness.      Comments: Suprapubic catheter   Musculoskeletal:         General: Normal range of motion.      Cervical back: Normal range of motion.      Right lower leg: Edema present.      Left lower leg: Edema present.    Skin:     General: Skin is warm and dry.      Capillary Refill: Capillary refill takes less than 2 seconds.   Neurological:      Mental Status: She is alert and oriented to person, place, and time.      Comments: Right facial droop, chronic   Psychiatric:         Mood and Affect: Mood normal.       Significant Labs: All pertinent labs within the past 24 hours have been reviewed.    Significant Imaging: I have reviewed all pertinent imaging results/findings within the past 24 hours.

## 2022-04-17 NOTE — HPI
Tasha Hawley is a 67 yo female with a past medical history of Depression, Anemia, Congestive heart failure, Renal disorder, Lumbar radiculopathy, Sleep apnea, Neurogenic bladder in which she has a suprapubic catheter, recurrent UTI, Chronic pain with epidural pain pump, Coronary artery disease, Hypothyroidism, and Sleep apnea, chronic facial droop. Her PCP is Dr. Mesfin Hodges. She is established with Dr. Karla Amezquita for Nephrology. Dr. Mayo is her Urologist. Patient has allergies to these antibiotics: bactrim anaphylaxis, vancomycin SOB and rash, and many more allergies to other medications and substances.  She has home health with [facility] . Patient was admitted to [facility] [place] on 4/14/22 with complaint of body aches for 3 - 4 days PTA and poor oral intake and red-orange urine back pain and nausea also with constipation. UA on admit positive for 3+ nitrites, 78 wbc, trace blood. Patient was started on ceftriaxone empirically 4/14 and  was consulted - do not see any Urology note in chart. On 4/15 the ceftriaxone was changed to cefepime empirically due to history of GNR UC in past. Renal US done 4/16 - Limited exam, grossly unchanged from prior study 02/04/2022.  No evidence of hydronephrosis; UC on admit positive for staph aureus - sensitivity pending. On 4/17 the cefepime was changed to linezolid empirically when staph aureus resulted. ID COnsult called 4/17 for help with antibiotics.     The staph from UC on 4/17 is MSSA; recommend changing the linezolid to cefazolin IV for now then can change to PO when tolerating oral agents. Patient tolerated cefepime and ceftriaxone earlier this hospital stay.     4/17 spoke with patient - she is complaining of SOB and some chest congestion. Having constipation and nausea as well. Last Oxygen saturation was good at 100% on RA. Primary team gave patient lasix today. Patient complaining of back pain and lower abdominal pain. She has these nish with UTIs in the past. She has a  chronic pain pump for back pain.

## 2022-04-17 NOTE — SUBJECTIVE & OBJECTIVE
Past Medical History:   Diagnosis Date    Anticoagulant long-term use      Anxiety      Arthritis      Bilateral lower extremity edema       severe chronic    Carotid artery occlusion      Cataract      CHF (congestive heart failure)      Coronary artery disease       subtotalled LAD with collateral    Depression      Fever blister      Hypothyroid      Iron deficiency anemia      Lumbar radiculopathy       with chronic pain    Ocular migraine      Renal disorder      Sleep apnea       cpap               Past Surgical History:   Procedure Laterality Date    ADENOIDECTOMY        BACK SURGERY         x 12    CARDIAC CATHETERIZATION   2016     subtotalled LAD with right to left collaterals    CATARACT EXTRACTION W/  INTRAOCULAR LENS IMPLANT Left       Dr Coleman     CYSTOSCOPIC LITHOLAPAXY N/A 6/27/2019     Procedure: CYSTOLITHOLAPAXY;  Surgeon: Shireen Mayo MD;  Location: [facility] OR 2ND FLR;  Service: Urology;  Laterality: N/A;    CYSTOSCOPIC LITHOLAPAXY N/A 9/3/2019     Procedure: CYSTOLITHOLAPAXY;  Surgeon: Shireen Mayo MD;  Location: [facility] OR 2ND FLR;  Service: Urology;  Laterality: N/A;    CYSTOSCOPY N/A 7/13/2021     Procedure: CYSTOSCOPY;  Surgeon: Shireen Mayo MD;  Location: [facility] OR 1ST FLR;  Service: Urology;  Laterality: N/A;    CYSTOSCOPY   11/16/2021     Procedure: CYSTOSCOPY;  Surgeon: Shireen Mayo MD;  Location: [facility] OR 1ST FLR;  Service: Urology;;    ESOPHAGOGASTRODUODENOSCOPY N/A 5/23/2018     Procedure: ESOPHAGOGASTRODUODENOSCOPY (EGD);  Surgeon: Prince Vance MD;  Location: [facility] (4TH FLR);  Service: Endoscopy;  Laterality: N/A;  r/s 'd per Dr. Vance due to family emergency- ER    HYSTERECTOMY   1975     endometriosis    INJECTION OF BOTULINUM TOXIN TYPE A   7/13/2021     Procedure: INJECTION, BOTULINUM TOXIN, 200units;  Surgeon: Shireen Mayo MD;  Location: [facility] OR 1ST FLR;  Service: Urology;;    INJECTION OF BOTULINUM TOXIN TYPE A   11/16/2021     Procedure: INJECTION,  BOTULINUM TOXIN, 200units;  Surgeon: Shireen Mayo MD;  Location: [facility] OR [address] [place] [address];  Service: Urology;;    pain pump placement         SQ Dilaudid Pump managed by Dr. Hillman, Pain Management    REPLACEMENT OF CATHETER N/A 10/31/2019     Procedure: REPLACEMENT, CATHETER-SUPRAPUBIC;  Surgeon: Shireen Mayo MD;  Location: [facility] OR [address] [place] [address];  Service: Urology;  Laterality: N/A;    SPINAL CORD STIMULATOR REMOVAL         before [facility]    SPINE SURGERY   5-13-13     CERVICAL FUSION    TONSILLECTOMY                   Review of patient's allergies indicates:   Allergen Reactions    (d)-limonene flavor         Other reaction(s): difficult intubation  Other reaction(s): Difficulty breathing    Bactrim [sulfamethoxazole-trimethoprim] Anaphylaxis    Benadryl [diphenhydramine hcl] Shortness Of Breath    Fentanyl Itching, Nausea And Vomiting and Swelling                  Imitrex [sumatriptan succinate] Shortness Of Breath    Topamax [topiramate] Shortness Of Breath    Vancomycin Shortness Of Breath       Rash    Butorphanol tartrate      Darvocet a500 [propoxyphene n-acetaminophen]         Other reaction(s): Difficulty breathing    White petrolatum-zinc      Zinc oxide-white petrolatum         Other reaction(s): Difficulty breathing    Latex, natural rubber Itching and Rash    Phenytoin Rash and Other (See Comments)       Trouble breathing         No current facility-administered medications on file prior to encounter.           Current Outpatient Medications on File Prior to Encounter   Medication Sig    aspirin (ECOTRIN) 81 MG EC tablet Take 1 tablet (81 mg total) by mouth once daily.    atorvastatin (LIPITOR) 80 MG tablet TAKE ONE TABLET BY MOUTH EVERY DAY    butalbital-acetaminophen-caffeine -40 mg (FIORICET, ESGIC) -40 mg per tablet Take 1 tablet by mouth daily as needed.    FLUoxetine 20 MG capsule Take 3 capsules (60 mg total) by mouth once daily.    intrathecal pain pump compound Hydromorphone (7.5  mg/mL) infusion at 3.6799 mg/day (0.1533 mg/hr) out of a total reservoir volume of 37.3 mL  Pump filled every 2 months    levothyroxine (SYNTHROID) 125 MCG tablet Take 1 tablet (125 mcg total) by mouth before breakfast.    lidocaine (LIDODERM) 5 % daily as needed.     nitroGLYCERIN (NITROSTAT) 0.4 MG SL tablet Place 1 tablet (0.4 mg total) under the tongue every 5 (five) minutes as needed for Chest pain.    ondansetron (ZOFRAN-ODT) 4 MG TbDL Take 1 tablet (4 mg total) by mouth every 8 (eight) hours as needed (nausea).    oxybutynin (DITROPAN XL) 15 MG TR24 Take 1 tablet (15 mg total) by mouth once daily.    pantoprazole (PROTONIX) 40 MG tablet TAKE ONE TABLET BY MOUTH EVERY DAY    potassium chloride (MICRO-K) 10 MEQ CpSR TAKE TWO CAPSULES BY MOUTH EVERY DAY    promethazine (PHENERGAN) 25 MG tablet Take 25 mg by mouth every 6 (six) hours as needed for Nausea.    QUEtiapine (SEROQUEL) 100 MG Tab Take 2 tablets (200 mg total) by mouth nightly.    QUEtiapine (SEROQUEL) 25 MG Tab Take 12.5-25 mg twice daily as needed for anxiety    senna (SENNA LAX) 8.6 mg tablet Take 2 tablets by mouth 2 (two) times daily.    torsemide (DEMADEX) 100 MG Tab TAKE ONE TABLET BY MOUTH EVERY DAY    traZODone (DESYREL) 100 MG tablet Take 3 tablets (300 mg total) by mouth every evening.      Family History         Problem Relation (Age of Onset)     Cancer Mother (55), Father     Cirrhosis Paternal Aunt, Paternal Uncle     Colon cancer Maternal Uncle     Esophageal cancer Father     Heart disease Maternal Uncle     Liver disease Paternal Aunt, Paternal Uncle     No Known Problems Brother, Brother, Sister, Maternal Aunt, Maternal Grandfather, Paternal Grandmother, Paternal Grandfather     Parkinsonism Maternal Grandmother     Tremor Maternal Grandmother                   Tobacco Use    Smoking status: Never Smoker    Smokeless tobacco: Never Used   Substance and Sexual Activity    Alcohol use: Never    Drug use: No    Sexual activity: Never        Partners: Male        Review of Systems   HENT:  Positive for hearing loss.    Respiratory:  Positive for shortness of breath.    Gastrointestinal:  Positive for constipation. Negative for diarrhea, nausea and vomiting.   Genitourinary:  Positive for flank pain.        Bilateral   Objective:     Vital Signs (Most Recent):  Temp: 98.1 °F (36.7 °C) (04/17/22 1157)  Pulse: 77 (04/17/22 1157)  Resp: 18 (04/17/22 1157)  BP: (!) 101/50 (04/17/22 1157)  SpO2: 100 % (04/17/22 1157)   Vital Signs (24h Range):  Temp:  [96.2 °F (35.7 °C)-98.6 °F (37 °C)] 98.1 °F (36.7 °C)  Pulse:  [45-98] 77  Resp:  [16-18] 18  SpO2:  [94 %-100 %] 100 %  BP: ()/(44-70) 101/50     Weight: 86.8 kg (191 lb 5.8 oz)  Body mass index is 32.85 kg/m².    Estimated Creatinine Clearance: 29.9 mL/min (A) (based on SCr of 2 mg/dL (H)).    Physical Exam  HENT:      Head:      Comments: Hard of hearing  Cardiovascular:      Heart sounds: Normal heart sounds.   Pulmonary:      Breath sounds: Normal breath sounds.   Abdominal:      General: Bowel sounds are normal. There is no distension.      Tenderness: There is abdominal tenderness.      Comments: Tender to palpation of lower abdomen; bandage over suprapubic catheter   Musculoskeletal:      Right lower leg: No edema.      Left lower leg: No edema.      Comments: Back - positive cva tenderness both sides, right slightly greater than left       Significant Labs: Blood Culture:   Recent Labs   Lab 01/21/22  1200 01/21/22  1205 02/03/22  1328 02/24/22  1246 02/24/22  1304   LABBLOO No growth after 5 days. No growth after 5 days. No growth after 5 days.  No growth after 5 days. No growth after 5 days. No growth after 5 days.     CBC:   Recent Labs   Lab 04/16/22  0748   WBC 4.37   HGB 9.7*   HCT 32.6*        CMP:   Recent Labs   Lab 04/16/22  0748 04/17/22  0325    140   K 4.3 3.8    97   CO2 24 31*   GLU 79 88   BUN 15 16   CREATININE 1.9* 2.0*   CALCIUM 9.0 8.8   ANIONGAP 11 12    EGFRNONAA 27* 26*       Urine Culture:   Recent Labs   Lab 21  1703 22  1230 22  1200 22  1614 04/15/22  1100   LABURIN STAPHYLOCOCCUS AUREUS  50,000 - 99,999 cfu/ml  No other significant isolate  * No growth PSEUDOMONAS FLUORESCENS/PUTIDA  > 100,000 cfu/ml  No other significant isolate  * CANDIDA PARAPSILOSIS  > 100,000 cfu/ml  Treatment of asymptomatic candiduria is not recommended (except for   specific populations). Candida isolated in the urine typically   represents colonization. If an indwelling urinary catheter is present  it should be removed or replaced.  * STAPHYLOCOCCUS AUREUS  >100,000cfu/ml  *     Urine Studies:   Recent Labs   Lab 22  1545 22  1713 04/15/22  1100   COLORU Yellow   < > Yellow   APPEARANCEUA Hazy*   < > Hazy*   PHUR 7.0   < > 7.0   SPECGRAV >=1.030*   < > 1.010   PROTEINUA 1+*   < > Trace*   GLUCUA Negative   < > Negative   KETONESU Negative   < > Negative   BILIRUBINUA Negative   < > Negative   OCCULTUA 3+*   < > 3+*   NITRITE Positive*   < > Negative   UROBILINOGEN Negative   < > Negative   LEUKOCYTESUR 3+*   < > 3+*   RBCUA 50*   < > 76*   WBCUA >100*   < > >100*   BACTERIA Moderate*   < > Few*   SQUAMEPITHEL 5   < > 1   HYALINECASTS 0  --   --     < > = values in this interval not displayed.       Significant Imagin/15/22 US retroperitoneal - Impression:  Limited exam, grossly unchanged from prior study 2022.  No evidence of hydronephrosis

## 2022-04-17 NOTE — CONSULTS
[facility]  Infectious Disease  Consult Note    Patient Name: Tasha Hawley  MRN: [id number]  Admission Date: 4/14/2022  Hospital Length of Stay: 0 days  Attending Physician: Nic Mistry, *  Primary Care Provider: Mesfin Hodges Ii, MD     Isolation Status: No active isolations    Patient information was obtained from patient, ER records, primary team and chart.      Inpatient consult to Infectious Diseases  Consult performed by: Jennifer Mayen MD  Consult ordered by: Nic Mistry MD  Reason for consult: Staph aureus UTI        Assessment/Plan:     Urinary tract infection associated with cystostomy catheter  67 y/o with chronic suprapubic catheter and several drug/substance allergies including bactrim and vancomycin antibiotics. Has tolerated cephalosporins this hospital stay. Has staph aureus on urine culture from admit - MSSA. Was placed on linezolid pending staph sensitivities today    Rec  Change linezolid to cefazolin 2 grams IVPB every 12 h  Will be able to increase cefazolin dose as renal function gets better  Will need long term antibiotics likely 2 weeks for complicated UTI - would treat for 2 weeks after urinary catheter change date  Need to get suprapubic catheter changed - should happen tomorrow.   Patient is nauseated and constipated - continue IV antibiotics for now        Thank you for your consult. I will follow-up with patient. Please contact us if you have any additional questions.[phone]    Jennifer Mayen MD  Infectious Disease  [place] [facility]    Subjective:     Principal Problem: CARITO (acute kidney injury)    HPI: Tasha Hawley is a 67 yo female with a past medical history of Depression, Anemia, Congestive heart failure, Renal disorder, Lumbar radiculopathy, Sleep apnea, Neurogenic bladder in which she has a suprapubic catheter, recurrent UTI, Chronic pain with epidural pain pump, Coronary artery disease, Hypothyroidism, and Sleep apnea, chronic facial  jin. Her PCP is Dr. Mesfin Hodges. She is established with Dr. Karla Amezquita for Nephrology. Dr. Mayo is her Urologist. Patient has allergies to these antibiotics: bactrim anaphylaxis, vancomycin SOB and rash, and many more allergies to other medications and substances.  She has home health with [facility] . [place] [facility] [place] with complaint of body aches for 3 - 4 days PTA and poor oral intake and red-orange urine back pain and nausea also with constipation. UA on admit positive for 3+ nitrites, 78 wbc, trace blood. Patient was started on ceftriaxone empirically 4/14 and  was consulted - do not see any Urology note in chart. On 4/15 the ceftriaxone was changed to cefepime empirically due to history of GNR UC in past. Renal US done 4/16 - Limited exam, grossly unchanged from prior study 02/04/2022.  No evidence of hydronephrosis; UC on admit positive for staph aureus - sensitivity pending. On 4/17 the cefepime was changed to linezolid empirically when staph aureus resulted. ID COnsult called 4/17 for help with antibiotics.     The staph from UC on 4/17 is MSSA; recommend changing the linezolid to cefazolin IV for now then can change to PO when tolerating oral agents. Patient tolerated cefepime and ceftriaxone earlier this hospital stay.     4/17 spoke with patient - she is complaining of SOB and some chest congestion. Having constipation and nausea as well. Last Oxygen saturation was good at 100% on RA. Primary team gave patient lasix today. Patient complaining of back pain and lower abdominal pain. She has these nish with UTIs in the past. She has a chronic pain pump for back pain.       Past Medical History:   Diagnosis Date    Anticoagulant long-term use      Anxiety      Arthritis      Bilateral lower extremity edema       severe chronic    Carotid artery occlusion      Cataract      CHF (congestive heart failure)      Coronary artery disease       subtotalled LAD with collateral     Depression      Fever blister      Hypothyroid      Iron deficiency anemia      Lumbar radiculopathy       with chronic pain    Ocular migraine      Renal disorder      Sleep apnea       cpap               Past Surgical History:   Procedure Laterality Date    ADENOIDECTOMY        BACK SURGERY         x 12    CARDIAC CATHETERIZATION   2016     subtotalled LAD with right to left collaterals    CATARACT EXTRACTION W/  INTRAOCULAR LENS IMPLANT Left       Dr Coleman     CYSTOSCOPIC LITHOLAPAXY N/A 6/27/2019     Procedure: CYSTOLITHOLAPAXY;  Surgeon: Shireen Mayo MD;  Location: [facility] OR 2ND FLR;  Service: Urology;  Laterality: N/A;    CYSTOSCOPIC LITHOLAPAXY N/A 9/3/2019     Procedure: CYSTOLITHOLAPAXY;  Surgeon: Shireen Mayo MD;  Location: [facility] OR 2ND FLR;  Service: Urology;  Laterality: N/A;    CYSTOSCOPY N/A 7/13/2021     Procedure: CYSTOSCOPY;  Surgeon: Shireen Mayo MD;  Location: [facility] OR 1ST FLR;  Service: Urology;  Laterality: N/A;    CYSTOSCOPY   11/16/2021     Procedure: CYSTOSCOPY;  Surgeon: Shireen Mayo MD;  Location: [facility] OR 1ST FLR;  Service: Urology;;    ESOPHAGOGASTRODUODENOSCOPY N/A 5/23/2018     Procedure: ESOPHAGOGASTRODUODENOSCOPY (EGD);  Surgeon: Prince Vance MD;  Location: [facility] (4TH FLR);  Service: Endoscopy;  Laterality: N/A;  r/s 'd per Dr. Vance due to family emergency- ER    HYSTERECTOMY   1975     endometriosis    INJECTION OF BOTULINUM TOXIN TYPE A   7/13/2021     Procedure: INJECTION, BOTULINUM TOXIN, 200units;  Surgeon: Shireen Mayo MD;  Location: [facility] OR 1ST FLR;  Service: Urology;;    INJECTION OF BOTULINUM TOXIN TYPE A   11/16/2021     Procedure: INJECTION, BOTULINUM TOXIN, 200units;  Surgeon: Shireen Mayo MD;  Location: [facility] OR 1ST FLR;  Service: Urology;;    pain pump placement         SQ Dilaudid Pump managed by Dr. Hillman, Pain Management    REPLACEMENT OF CATHETER N/A 10/31/2019     Procedure: REPLACEMENT,  CATHETER-SUPRAPUBIC;  Surgeon: Shireen Mayo MD;  Location: [facility] OR [address] [place] [address];  Service: Urology;  Laterality: N/A;    SPINAL CORD STIMULATOR REMOVAL         before [place]    SPINE SURGERY   5-13-13     CERVICAL FUSION    TONSILLECTOMY                   Review of patient's allergies indicates:   Allergen Reactions    (d)-limonene flavor         Other reaction(s): difficult intubation  Other reaction(s): Difficulty breathing    Bactrim [sulfamethoxazole-trimethoprim] Anaphylaxis    Benadryl [diphenhydramine hcl] Shortness Of Breath    Fentanyl Itching, Nausea And Vomiting and Swelling                  Imitrex [sumatriptan succinate] Shortness Of Breath    Topamax [topiramate] Shortness Of Breath    Vancomycin Shortness Of Breath       Rash    Butorphanol tartrate      Darvocet a500 [propoxyphene n-acetaminophen]         Other reaction(s): Difficulty breathing    White petrolatum-zinc      Zinc oxide-white petrolatum         Other reaction(s): Difficulty breathing    Latex, natural rubber Itching and Rash    Phenytoin Rash and Other (See Comments)       Trouble breathing         No current facility-administered medications on file prior to encounter.           Current Outpatient Medications on File Prior to Encounter   Medication Sig    aspirin (ECOTRIN) 81 MG EC tablet Take 1 tablet (81 mg total) by mouth once daily.    atorvastatin (LIPITOR) 80 MG tablet TAKE ONE TABLET BY MOUTH EVERY DAY    butalbital-acetaminophen-caffeine -40 mg (FIORICET, ESGIC) -40 mg per tablet Take 1 tablet by mouth daily as needed.    FLUoxetine 20 MG capsule Take 3 capsules (60 mg total) by mouth once daily.    intrathecal pain pump compound Hydromorphone (7.5 mg/mL) infusion at 3.6799 mg/day (0.1533 mg/hr) out of a total reservoir volume of 37.3 mL  Pump filled every 2 months    levothyroxine (SYNTHROID) 125 MCG tablet Take 1 tablet (125 mcg total) by mouth before breakfast.    lidocaine (LIDODERM) 5 %  daily as needed.     nitroGLYCERIN (NITROSTAT) 0.4 MG SL tablet Place 1 tablet (0.4 mg total) under the tongue every 5 (five) minutes as needed for Chest pain.    ondansetron (ZOFRAN-ODT) 4 MG TbDL Take 1 tablet (4 mg total) by mouth every 8 (eight) hours as needed (nausea).    oxybutynin (DITROPAN XL) 15 MG TR24 Take 1 tablet (15 mg total) by mouth once daily.    pantoprazole (PROTONIX) 40 MG tablet TAKE ONE TABLET BY MOUTH EVERY DAY    potassium chloride (MICRO-K) 10 MEQ CpSR TAKE TWO CAPSULES BY MOUTH EVERY DAY    promethazine (PHENERGAN) 25 MG tablet Take 25 mg by mouth every 6 (six) hours as needed for Nausea.    QUEtiapine (SEROQUEL) 100 MG Tab Take 2 tablets (200 mg total) by mouth nightly.    QUEtiapine (SEROQUEL) 25 MG Tab Take 12.5-25 mg twice daily as needed for anxiety    senna (SENNA LAX) 8.6 mg tablet Take 2 tablets by mouth 2 (two) times daily.    torsemide (DEMADEX) 100 MG Tab TAKE ONE TABLET BY MOUTH EVERY DAY    traZODone (DESYREL) 100 MG tablet Take 3 tablets (300 mg total) by mouth every evening.      Family History         Problem Relation (Age of Onset)     Cancer Mother (55), Father     Cirrhosis Paternal Aunt, Paternal Uncle     Colon cancer Maternal Uncle     Esophageal cancer Father     Heart disease Maternal Uncle     Liver disease Paternal Aunt, Paternal Uncle     No Known Problems Brother, Brother, Sister, Maternal Aunt, Maternal Grandfather, Paternal Grandmother, Paternal Grandfather     Parkinsonism Maternal Grandmother     Tremor Maternal Grandmother                   Tobacco Use    Smoking status: Never Smoker    Smokeless tobacco: Never Used   Substance and Sexual Activity    Alcohol use: Never    Drug use: No    Sexual activity: Never       Partners: Male        Review of Systems   HENT:  Positive for hearing loss.    Respiratory:  Positive for shortness of breath.    Gastrointestinal:  Positive for constipation. Negative for diarrhea, nausea and vomiting.    Genitourinary:  Positive for flank pain.        Bilateral   Objective:     Vital Signs (Most Recent):  Temp: 98.1 °F (36.7 °C) (04/17/22 1157)  Pulse: 77 (04/17/22 1157)  Resp: 18 (04/17/22 1157)  BP: (!) 101/50 (04/17/22 1157)  SpO2: 100 % (04/17/22 1157)   Vital Signs (24h Range):  Temp:  [96.2 °F (35.7 °C)-98.6 °F (37 °C)] 98.1 °F (36.7 °C)  Pulse:  [45-98] 77  Resp:  [16-18] 18  SpO2:  [94 %-100 %] 100 %  BP: ()/(44-70) 101/50     Weight: 86.8 kg (191 lb 5.8 oz)  Body mass index is 32.85 kg/m².    Estimated Creatinine Clearance: 29.9 mL/min (A) (based on SCr of 2 mg/dL (H)).    Physical Exam  HENT:      Head:      Comments: Hard of hearing  Cardiovascular:      Heart sounds: Normal heart sounds.   Pulmonary:      Breath sounds: Normal breath sounds.   Abdominal:      General: Bowel sounds are normal. There is no distension.      Tenderness: There is abdominal tenderness.      Comments: Tender to palpation of lower abdomen; bandage over suprapubic catheter   Musculoskeletal:      Right lower leg: No edema.      Left lower leg: No edema.      Comments: Back - positive cva tenderness both sides, right slightly greater than left       Significant Labs: Blood Culture:   Recent Labs   Lab 01/21/22  1200 01/21/22  1205 02/03/22  1328 02/24/22  1246 02/24/22  1304   LABBLOO No growth after 5 days. No growth after 5 days. No growth after 5 days.  No growth after 5 days. No growth after 5 days. No growth after 5 days.     CBC:   Recent Labs   Lab 04/16/22  0748   WBC 4.37   HGB 9.7*   HCT 32.6*        CMP:   Recent Labs   Lab 04/16/22  0748 04/17/22  0325    140   K 4.3 3.8    97   CO2 24 31*   GLU 79 88   BUN 15 16   CREATININE 1.9* 2.0*   CALCIUM 9.0 8.8   ANIONGAP 11 12   EGFRNONAA 27* 26*       Urine Culture:   Recent Labs   Lab 12/20/21  1703 01/11/22  1230 01/21/22  1200 02/03/22  1614 04/15/22  1100   LABURIN STAPHYLOCOCCUS AUREUS  50,000 - 99,999 cfu/ml  No other significant isolate  *  No growth PSEUDOMONAS FLUORESCENS/PUTIDA  > 100,000 cfu/ml  No other significant isolate  * CANDIDA PARAPSILOSIS  > 100,000 cfu/ml  Treatment of asymptomatic candiduria is not recommended (except for   specific populations). Candida isolated in the urine typically   represents colonization. If an indwelling urinary catheter is present  it should be removed or replaced.  * STAPHYLOCOCCUS AUREUS  >100,000cfu/ml  *     Urine Studies:   Recent Labs   Lab 22  1545 22  1713 04/15/22  1100   COLORU Yellow   < > Yellow   APPEARANCEUA Hazy*   < > Hazy*   PHUR 7.0   < > 7.0   SPECGRAV >=1.030*   < > 1.010   PROTEINUA 1+*   < > Trace*   GLUCUA Negative   < > Negative   KETONESU Negative   < > Negative   BILIRUBINUA Negative   < > Negative   OCCULTUA 3+*   < > 3+*   NITRITE Positive*   < > Negative   UROBILINOGEN Negative   < > Negative   LEUKOCYTESUR 3+*   < > 3+*   RBCUA 50*   < > 76*   WBCUA >100*   < > >100*   BACTERIA Moderate*   < > Few*   SQUAMEPITHEL 5   < > 1   HYALINECASTS 0  --   --     < > = values in this interval not displayed.       Significant Imagin/15/22 US retroperitoneal - Impression:  Limited exam, grossly unchanged from prior study 2022.  No evidence of hydronephrosis

## 2022-04-17 NOTE — PROGRESS NOTES
[facility] Medicine  Progress Note    Patient Name: Tasha Hawley  MRN: [id number]  Patient Class: OP- Observation   Admission Date: 4/14/2022  Length of Stay: 0 days  Attending Physician: Nic Mistry, *  Primary Care Provider: Mesfin Hodges Ii, MD        Subjective:     Principal Problem:CARITO (acute kidney injury)        HPI:  Tasha Hawley is a 65 yo female with a past medical history of Depression, Anemia, Congestive heart failure, Renal disorder, Lumbar radiculopathy, Sleep apnea, Neurogenic bladder in which she has a suprapubic catheter, recurrent UTI, Chronic pain with epidural pain pump, Coronary artery disease, Hypothyroidism, and Sleep apnea. Her PCP is Dr. Mesfin Hodges. She is established with Dr. Karla Amezquita for Nephrology. Dr. Mayo is her Urologist. She has home health with [facility] . She presented to the ED at [facility] via EMS with a cc of generalized body aches for the past 3-4 days. Associated symptoms include poor oral intake, red and orange colored urine, back pain and nausea. She also reports constipation. She denies fever, diarrhea, headaches, or blurred vision.     ED workup revealed WBC UA 78, UA Nitrate +, UA Leukocytes 3+, Hgb 9.6, Hct 29.8, K 3.0, CO2 33, Cr 2.8, Albumin 3.4, ALT 9, Drug screen + Opiate, EKG SB, rate 47, CXR Chronic appearing interstitial findings, no large focal consolidation. IV Rocephin initiated. Urology consulted. Admitted to Hospital medicine for further evaluation and treatment.       Overview/Hospital Course:  No notes on file    Interval History:  still with flank pain; reports significant bilateral LE burning/numb pain today. Discussed possible gabapentin. Cultures growing staph, awaiting sensitivities; switch to zyvox given vanc allergy.    Review of Systems   Constitutional:  Negative for chills and fever.   Respiratory:  Negative for shortness of breath.    Cardiovascular:  Positive for leg swelling. Negative for chest pain.    Gastrointestinal:  Positive for abdominal pain.   Genitourinary:  Positive for dysuria.   Neurological:  Positive for weakness.   Objective:     Vital Signs (Most Recent):  Temp: 97.8 °F (36.6 °C) (04/17/22 0832)  Pulse: (!) 55 (04/17/22 0849)  Resp: 18 (04/17/22 0832)  BP: (!) 100/55 (04/17/22 0849)  SpO2: 95 % (04/17/22 0849)   Vital Signs (24h Range):  Temp:  [96.2 °F (35.7 °C)-98.6 °F (37 °C)] 97.8 °F (36.6 °C)  Pulse:  [46-98] 55  Resp:  [16-18] 18  SpO2:  [94 %-100 %] 95 %  BP: ()/(44-70) 100/55     Weight: 86.8 kg (191 lb 5.8 oz)  Body mass index is 32.85 kg/m².    Intake/Output Summary (Last 24 hours) at 4/17/2022 1036  Last data filed at 4/17/2022 0538  Gross per 24 hour   Intake 600 ml   Output 3750 ml   Net -3150 ml        Physical Exam  Constitutional:       Appearance: She is ill-appearing.   HENT:      Head: Atraumatic.      Nose: Nose normal.      Mouth/Throat:      Mouth: Mucous membranes are moist.   Eyes:      Pupils: Pupils are equal, round, and reactive to light.   Cardiovascular:      Rate and Rhythm: Bradycardia present.      Pulses: Normal pulses.   Pulmonary:      Effort: Pulmonary effort is normal.   Abdominal:      General: Abdomen is flat. Bowel sounds are normal.      Palpations: Abdomen is soft.      Tenderness: There is right CVA tenderness and left CVA tenderness.      Comments: Suprapubic catheter   Musculoskeletal:         General: Normal range of motion.      Cervical back: Normal range of motion.      Right lower leg: Edema present.      Left lower leg: Edema present.   Skin:     General: Skin is warm and dry.      Capillary Refill: Capillary refill takes less than 2 seconds.   Neurological:      Mental Status: She is alert and oriented to person, place, and time.      Comments: Right facial droop, chronic   Psychiatric:         Mood and Affect: Mood normal.       Significant Labs: All pertinent labs within the past 24 hours have been reviewed.    Significant Imaging: I  have reviewed all pertinent imaging results/findings within the past 24 hours.      Assessment/Plan:      * CARITO (acute kidney injury)  - baseline serum Cr 1.1, 2.8 on admit and 5.9 1 week ago  - appears to be slowly improving; UA c/w infection but catheter needs to be changed  - renal US without hydro  - continue to monitor renal function with daily labs   - avoid nephrotoxins  - renally dose all medications   - monitor events that may lead to decreased renal perfusion (hypovolemia, hypotension, sepsis)  - monitor urine output to assure that no obstruction precipitates worsening in GFR    Staph aureus infection  - associated with suprapubic catheter  - awaiting sensitivities  - allergy to vancomycin, will treat with zyvox for now  - ID consult      Abdominal pain  - likely 2/2 UTI  - improving  - will need suprapubic catheter exchanged; urology was consulted    Anxiety  - resume nightly Trazodone   - resume Seroquel    Urinary tract infection associated with cystostomy catheter  - Chronic and recurrent UTIs  - Followed by ID and urology outpatient  - UA Leukocytes 3+  - Rocephin initiated; switched to cefepime given hx of pseudomonas  -switch to linezolid given staph on culture, awaiting sensitivities  -urine with staph aureus; blood cultures NGTD  - Consult urology for catheter change and evaluation, pending      Bradycardia  - Chronic  - HR 45s-55s, asymptomatic  - Continuous telemetry monitoring       Paresthesia of both lower extremities  - trial low dose gabapentin      Primary hypothyroidism  - Chronic and stable  - Continue levothyroxine 125mcg po before breakfast      Frequent falls  - PT/OT eval and treat: HH at discharge  - Fall precautions  - reinforced to patient to call for assistance      Neurogenic bladder  - Chronic catheter  - Ditropan 15 mg po daily    CHF (congestive heart failure)  - Denies shortness of breath  - Chronic BLE, bedrest  - CHF pathways  - monitor pulse ox    7/7/2021 TTE    · The  left ventricle is normal in size with normal systolic function.  · The estimated ejection fraction is 55%.  · Normal left ventricular diastolic function.  · Mild tricuspid regurgitation.  · Normal right ventricular size with normal right ventricular systolic function.  · The estimated PA systolic pressure is 47 mmHg.  · There is pulmonary hypertension.        Major depressive disorder, recurrent, mild  - Chronic and stable  - Continue chronic SSRI.    Sleep apnea  - CPAP every night        VTE Risk Mitigation (From admission, onward)    None          Discharge Planning   JACKIE:      Code Status: Full Code   Is the patient medically ready for discharge?:     Reason for patient still in hospital (select all that apply): Patient trending condition and Treatment                     Nic Mistry MD  Department of Hospital Medicine   [place] - [facility]

## 2022-04-17 NOTE — ASSESSMENT & PLAN NOTE
- Chronic and recurrent UTIs  - Followed by ID and urology outpatient  - UA Leukocytes 3+  - Rocephin initiated; switched to cefepime given hx of pseudomonas  -switch to linezolid given staph on culture, awaiting sensitivities  -urine with staph aureus; blood cultures NGTD  - Consult urology for catheter change and evaluation, pending

## 2022-04-17 NOTE — ASSESSMENT & PLAN NOTE
67 y/o with chronic suprapubic catheter and several drug/substance allergies including bactrim and vancomycin antibiotics. Has tolerated cephalosporins this hospital stay. Has staph aureus on urine culture from admit - MSSA. Was placed on linezolid pending staph sensitivities today    Rec  Change linezolid to cefazolin 2 grams IVPB every 12 h  Will be able to increase cefazolin dose as renal function gets better  Will need long term antibiotics likely 2 weeks for complicated UTI - would treat for 2 weeks after urinary catheter change date  Need to get suprapubic catheter changed - should happen tomorrow.   Patient is nauseated and constipated - continue IV antibiotics for now

## 2022-04-18 ENCOUNTER — TELEPHONE (OUTPATIENT)
Dept: UROLOGY | Facility: CLINIC | Age: 67
End: 2022-04-18
Payer: MEDICARE

## 2022-04-18 LAB
ANION GAP SERPL CALC-SCNC: 12 MMOL/L (ref 8–16)
BUN SERPL-MCNC: 18 MG/DL (ref 8–23)
CALCIUM SERPL-MCNC: 8.9 MG/DL (ref 8.7–10.5)
CHLORIDE SERPL-SCNC: 94 MMOL/L (ref 95–110)
CO2 SERPL-SCNC: 35 MMOL/L (ref 23–29)
CREAT SERPL-MCNC: 2.1 MG/DL (ref 0.5–1.4)
EST. GFR  (AFRICAN AMERICAN): 28 ML/MIN/1.73 M^2
EST. GFR  (NON AFRICAN AMERICAN): 24 ML/MIN/1.73 M^2
GLUCOSE SERPL-MCNC: 85 MG/DL (ref 70–110)
POCT GLUCOSE: 125 MG/DL (ref 70–110)
POTASSIUM SERPL-SCNC: 3.7 MMOL/L (ref 3.5–5.1)
SODIUM SERPL-SCNC: 141 MMOL/L (ref 136–145)

## 2022-04-18 PROCEDURE — 25000003 PHARM REV CODE 250: Performed by: HOSPITALIST

## 2022-04-18 PROCEDURE — 99222 PR INITIAL HOSPITAL CARE,LEVL II: ICD-10-PCS | Mod: 25,,, | Performed by: UROLOGY

## 2022-04-18 PROCEDURE — 63600175 PHARM REV CODE 636 W HCPCS: Performed by: INTERNAL MEDICINE

## 2022-04-18 PROCEDURE — 25000003 PHARM REV CODE 250: Performed by: INTERNAL MEDICINE

## 2022-04-18 PROCEDURE — 51710 PR CHANGE OF BLADDER TUBE,COMPLICATED: ICD-10-PCS | Mod: ,,, | Performed by: UROLOGY

## 2022-04-18 PROCEDURE — 80048 BASIC METABOLIC PNL TOTAL CA: CPT | Performed by: HOSPITALIST

## 2022-04-18 PROCEDURE — 99222 1ST HOSP IP/OBS MODERATE 55: CPT | Mod: 25,,, | Performed by: UROLOGY

## 2022-04-18 PROCEDURE — 51710 CHANGE OF BLADDER TUBE: CPT | Mod: ,,, | Performed by: UROLOGY

## 2022-04-18 PROCEDURE — 63600175 PHARM REV CODE 636 W HCPCS: Performed by: NURSE PRACTITIONER

## 2022-04-18 PROCEDURE — 25000003 PHARM REV CODE 250: Performed by: NURSE PRACTITIONER

## 2022-04-18 PROCEDURE — 36415 COLL VENOUS BLD VENIPUNCTURE: CPT | Performed by: HOSPITALIST

## 2022-04-18 PROCEDURE — 11000001 HC ACUTE MED/SURG PRIVATE ROOM

## 2022-04-18 RX ORDER — POLYETHYLENE GLYCOL 3350 17 G/17G
17 POWDER, FOR SOLUTION ORAL DAILY
Status: DISCONTINUED | OUTPATIENT
Start: 2022-04-18 | End: 2022-04-25 | Stop reason: HOSPADM

## 2022-04-18 RX ORDER — POLYETHYLENE GLYCOL 3350 17 G/17G
17 POWDER, FOR SOLUTION ORAL DAILY
Status: DISCONTINUED | OUTPATIENT
Start: 2022-04-19 | End: 2022-04-18

## 2022-04-18 RX ADMIN — ATORVASTATIN CALCIUM 80 MG: 40 TABLET, FILM COATED ORAL at 09:04

## 2022-04-18 RX ADMIN — ONDANSETRON 4 MG: 2 INJECTION INTRAMUSCULAR; INTRAVENOUS at 09:04

## 2022-04-18 RX ADMIN — TRAZODONE HYDROCHLORIDE 300 MG: 100 TABLET ORAL at 09:04

## 2022-04-18 RX ADMIN — CEFAZOLIN SODIUM 2 G: 2 SOLUTION INTRAVENOUS at 11:04

## 2022-04-18 RX ADMIN — OXYCODONE 5 MG: 5 TABLET ORAL at 04:04

## 2022-04-18 RX ADMIN — ACETAMINOPHEN 650 MG: 325 TABLET ORAL at 09:04

## 2022-04-18 RX ADMIN — OXYBUTYNIN CHLORIDE 15 MG: 5 TABLET, EXTENDED RELEASE ORAL at 09:04

## 2022-04-18 RX ADMIN — OXYCODONE 5 MG: 5 TABLET ORAL at 11:04

## 2022-04-18 RX ADMIN — ONDANSETRON 4 MG: 2 INJECTION INTRAMUSCULAR; INTRAVENOUS at 12:04

## 2022-04-18 RX ADMIN — LEVOTHYROXINE SODIUM 125 MCG: 0.03 TABLET ORAL at 05:04

## 2022-04-18 RX ADMIN — GABAPENTIN 100 MG: 100 CAPSULE ORAL at 09:04

## 2022-04-18 RX ADMIN — SENNOSIDES 2 TABLET: 8.6 TABLET, FILM COATED ORAL at 09:04

## 2022-04-18 RX ADMIN — ONDANSETRON 4 MG: 2 INJECTION INTRAMUSCULAR; INTRAVENOUS at 04:04

## 2022-04-18 RX ADMIN — PANTOPRAZOLE SODIUM 40 MG: 40 TABLET, DELAYED RELEASE ORAL at 09:04

## 2022-04-18 RX ADMIN — OXYCODONE 5 MG: 5 TABLET ORAL at 09:04

## 2022-04-18 RX ADMIN — QUETIAPINE FUMARATE 200 MG: 100 TABLET ORAL at 09:04

## 2022-04-18 RX ADMIN — TORSEMIDE 100 MG: 20 TABLET ORAL at 09:04

## 2022-04-18 RX ADMIN — FLUOXETINE HYDROCHLORIDE 60 MG: 20 CAPSULE ORAL at 09:04

## 2022-04-18 RX ADMIN — LIDOCAINE 1 PATCH: 50 PATCH CUTANEOUS at 04:04

## 2022-04-18 NOTE — ASSESSMENT & PLAN NOTE
- Chronic and recurrent UTIs  - Followed by ID and urology outpatient  - UA Leukocytes 3+  - Rocephin initiated; switched to cefepime given hx of pseudomonas  -switch to linezolid given staph on culture, awaiting sensitivities  -urine with staph aureus; blood cultures NGTD  - Consult urology for catheter change and evaluation, performed today

## 2022-04-18 NOTE — ASSESSMENT & PLAN NOTE
65 y/o with chronic suprapubic catheter and several drug/substance allergies including bactrim and vancomycin antibiotics. Has tolerated cephalosporins this hospital stay. Has staph aureus on urine culture from admit - MSSA. Was placed on linezolid pending staph sensitivities today    Recommendations:  - Continue cefazolin 2g IV q12h for now given poor PO tolerance.  - Consider enema given poor PO laxative intake for constipation. Suspected fecal impaction from chronic opiate use.   - Once tolerating oral intake, can transition to cephalexin (Keflex) 500 mg TID. If CrCl improves >30 mL/min, can resume routine dose Keflex 500 mg QID.  - Continue cefazolin/cephalexin for total of 2 weeks from today. End date: 5/2/2022.   - Messaged [facility] to arrange follow up appointment (Dr. Gomez).

## 2022-04-18 NOTE — PT/OT/SLP PROGRESS
Physical Therapy      Patient Name:  Tasha Hawley   MRN:  [id number]    Patient not seen today secondary to  (pt experiencing nausea/vomiting(3184)). Will follow-up tomorrow.

## 2022-04-18 NOTE — PROGRESS NOTES
[facility]   Infectious Diseases  Progress Note    Patient Name: Tasha Hawley  MRN: [id number]  Admission Date: 4/14/2022  Length of Stay: 0 days  Attending Physician: Nic Mistry, *  Primary Care Provider: Mesfin Hodges Ii, MD    Isolation Status: No active isolations  Assessment/Plan:      Urinary tract infection associated with cystostomy catheter  65 y/o with chronic suprapubic catheter and several drug/substance allergies including bactrim and vancomycin antibiotics. Has tolerated cephalosporins this hospital stay. Has staph aureus on urine culture from admit - MSSA. Was placed on linezolid pending staph sensitivities today    Recommendations:  - Continue cefazolin 2g IV q12h for now given poor PO tolerance.  - Consider enema given poor PO laxative intake for constipation. Suspected fecal impaction from chronic opiate use.   - Once tolerating oral intake, can transition to cephalexin (Keflex) 500 mg TID. If CrCl improves >30 mL/min, can resume routine dose Keflex 500 mg QID.  - Continue cefazolin/cephalexin for total of 2 weeks from today. End date: 5/2/2022.   - Messaged [facility] to arrange follow up appointment (Dr. Gomez).         Anticipated Disposition: Need 2 weeks of abx for MSSA complicated UTI. Awaiting PO tolerance to transition to oral Keflex. I don't think we need Jefferson/PICC for now. ID will sign off.     Thank you for your consult. I will sign off. Please contact us if you have any additional questions.    Omer Lira MD  Infectious Diseases  [facility]     Subjective:     Principal Problem:CARITO (acute kidney injury)    HPI: Tasha Hawley is a 65 yo female with a past medical history of Depression, Anemia, Congestive heart failure, Renal disorder, Lumbar radiculopathy, Sleep apnea, Neurogenic bladder in which she has a suprapubic catheter, recurrent UTI, Chronic pain with epidural pain pump, Coronary artery disease, Hypothyroidism,  "and Sleep apnea, chronic facial droop. Her PCP is Dr. Mesfin Hodges. She is established with Dr. Karla Amezquita for Nephrology. Dr. Mayo is her Urologist. Patient has allergies to these antibiotics: bactrim anaphylaxis, vancomycin SOB and rash, and many more allergies to other medications and substances.  She has home health with [facility]. Patient was admitted to [facility] on 4/14/22 with complaint of body aches for 3 - 4 days PTA and poor oral intake and red-orange urine back pain and nausea also with constipation. UA on admit positive for 3+ nitrites, 78 wbc, trace blood. Patient was started on ceftriaxone empirically 4/14 and  was consulted - do not see any Urology note in chart. On 4/15 the ceftriaxone was changed to cefepime empirically due to history of GNR UC in past. Renal US done 4/16 - Limited exam, grossly unchanged from prior study 02/04/2022.  No evidence of hydronephrosis; UC on admit positive for staph aureus - sensitivity pending. On 4/17 the cefepime was changed to linezolid empirically when staph aureus resulted. ID COnsult called 4/17 for help with antibiotics.     The staph from UC on 4/17 is MSSA; recommend changing the linezolid to cefazolin IV for now then can change to PO when tolerating oral agents. Patient tolerated cefepime and ceftriaxone earlier this hospital stay.     4/17 spoke with patient - she is complaining of SOB and some chest congestion. Having constipation and nausea as well. Last Oxygen saturation was good at 100% on RA. Primary team gave patient lasix today. Patient complaining of back pain and lower abdominal pain. She has these nish with UTIs in the past. She has a chronic pain pump for back pain.     Interval History: No event overnight. Patient continues to have generalized abdominal pain and lower back pain. She reports headache, chills (not fever), nausea, constipation (reported no bowel movement for "days"), and some vomiting.     Review of Systems   Constitutional:  " Positive for appetite change and chills. Negative for fever.   Respiratory:  Negative for cough and shortness of breath.    Cardiovascular:  Negative for chest pain and leg swelling.   Gastrointestinal:  Positive for abdominal pain, constipation, nausea and vomiting. Negative for diarrhea.   Genitourinary:  Negative for dysuria.   Musculoskeletal:  Positive for back pain. Negative for arthralgias.   Skin:  Negative for rash.   Neurological:  Positive for headaches.   All other systems reviewed and are negative.  Objective:     Vital Signs (Most Recent):  Temp: 97 °F (36.1 °C) (04/18/22 0917)  Pulse: (!) 50 (04/18/22 0917)  Resp: 16 (04/18/22 1129)  BP: (!) 110/58 (manual) (04/18/22 0925)  SpO2: 97 % (04/18/22 0917)   Vital Signs (24h Range):  Temp:  [97 °F (36.1 °C)-98.7 °F (37.1 °C)] 97 °F (36.1 °C)  Pulse:  [47-63] 50  Resp:  [16-20] 16  SpO2:  [94 %-97 %] 97 %  BP: ()/(47-58) 110/58     Weight: 86.8 kg (191 lb 5.8 oz)  Body mass index is 32.85 kg/m².    Estimated Creatinine Clearance: 28.5 mL/min (A) (based on SCr of 2.1 mg/dL (H)).    Physical Exam  Vitals and nursing note reviewed.   Constitutional:       General: She is not in acute distress.     Appearance: She is obese.   HENT:      Head: Normocephalic and atraumatic.      Comments: Hard of hearing     Nose: Nose normal.      Mouth/Throat:      Mouth: Mucous membranes are moist.   Eyes:      General: No scleral icterus.     Conjunctiva/sclera: Conjunctivae normal.   Cardiovascular:      Rate and Rhythm: Regular rhythm. Bradycardia present.      Heart sounds: Normal heart sounds. No murmur heard.  Pulmonary:      Effort: No respiratory distress.      Breath sounds: Normal breath sounds. No wheezing.   Abdominal:      General: Bowel sounds are normal. There is no distension.      Tenderness: There is abdominal tenderness. There is right CVA tenderness and left CVA tenderness.      Comments: Tender to palpation of lower abdomen; suprapubic catheter  exchanged   Musculoskeletal:      Cervical back: Neck supple.      Right lower leg: No edema.      Left lower leg: No edema.      Comments: Lidocaine patch on lower back   Skin:     General: Skin is warm.      Capillary Refill: Capillary refill takes less than 2 seconds.      Findings: No rash.   Neurological:      Mental Status: She is alert. Mental status is at baseline.      Comments: Hearing loss noted   Psychiatric:         Mood and Affect: Mood normal.       Significant Labs: CBC: No results for input(s): WBC, HGB, HCT, PLT in the last 48 hours.  CMP:   Recent Labs   Lab 04/17/22  0325 04/18/22  0734    141   K 3.8 3.7   CL 97 94*   CO2 31* 35*   GLU 88 85   BUN 16 18   CREATININE 2.0* 2.1*   CALCIUM 8.8 8.9   ANIONGAP 12 12   EGFRNONAA 26* 24*     Urine Studies:   Recent Labs   Lab 03/29/22  1545 04/14/22  1713 04/15/22  1100   COLORU Yellow   < > Yellow   APPEARANCEUA Hazy*   < > Hazy*   PHUR 7.0   < > 7.0   SPECGRAV >=1.030*   < > 1.010   PROTEINUA 1+*   < > Trace*   GLUCUA Negative   < > Negative   KETONESU Negative   < > Negative   BILIRUBINUA Negative   < > Negative   OCCULTUA 3+*   < > 3+*   NITRITE Positive*   < > Negative   UROBILINOGEN Negative   < > Negative   LEUKOCYTESUR 3+*   < > 3+*   RBCUA 50*   < > 76*   WBCUA >100*   < > >100*   BACTERIA Moderate*   < > Few*   SQUAMEPITHEL 5   < > 1   HYALINECASTS 0  --   --     < > = values in this interval not displayed.     All pertinent labs within the past 24 hours have been reviewed.    Significant Imaging: I have reviewed all pertinent imaging results/findings within the past 24 hours. None in the past 24 hours.

## 2022-04-18 NOTE — PT/OT/SLP PROGRESS
Occupational Therapy  Visit Attempt    Patient Name:  Tasha Hawley   MRN:  [id number]    Patient not seen today secondary to Patient ill (Comment) (Patient reporting N/V and declining therapy.).     4/18/2022

## 2022-04-18 NOTE — CONSULTS
[place] [facility]  Urology  Consult Note    Patient Name: Tsaha Hawley  MRN: [id number]  Admission Date: 4/14/2022  Attending Provider: Nic Mistry, *   Consulting Provider: Myles Meneses MD  Principal Problem:CARITO (acute kidney injury)    Consults    Subjective:     HPI:   Mrs. Tasha Hawley is a 66 y.o. female who presents with body aches for 3-4 days.  Noted poor oral intake and possible hematuria.  She reports some back pain/flank pain.  H/o NGB with chronic SPT and UTIs. No Fever or chills.       Pt admits to chronic back pain, no pain to SPT or site.  Some leaking from SPT site and urethra intermittently.    She has followed at [facility] in past and was seen in consult for CARITO 2/2022.     Past Medical History:   Diagnosis Date    Anticoagulant long-term use     Anxiety     Arthritis     Bilateral lower extremity edema     severe chronic    Carotid artery occlusion     Cataract     CHF (congestive heart failure)     Coronary artery disease     subtotalled LAD with collateral    Depression     Fever blister     Hypothyroid     Iron deficiency anemia     Lumbar radiculopathy     with chronic pain    Ocular migraine     Renal disorder     Sleep apnea     cpap       Past Surgical History:   Procedure Laterality Date    ADENOIDECTOMY      BACK SURGERY      x 12    CARDIAC CATHETERIZATION  2016    subtotalled LAD with right to left collaterals    CATARACT EXTRACTION W/  INTRAOCULAR LENS IMPLANT Left     Dr Coleman     CYSTOSCOPIC LITHOLAPAXY N/A 6/27/2019    Procedure: CYSTOLITHOLAPAXY;  Surgeon: Shireen Mayo MD;  Location: [facility] OR [address] [place] [address];  Service: Urology;  Laterality: N/A;    CYSTOSCOPIC LITHOLAPAXY N/A 9/3/2019    Procedure: CYSTOLITHOLAPAXY;  Surgeon: Shireen Mayo MD;  Location: [facility] OR [address] [place] [address];  Service: Urology;  Laterality: N/A;    CYSTOSCOPY N/A 7/13/2021    Procedure: CYSTOSCOPY;  Surgeon: Shireen Mayo MD;  Location: [facility] OR [address] [place] [address];  Service:  Urology;  Laterality: N/A;    CYSTOSCOPY  11/16/2021    Procedure: CYSTOSCOPY;  Surgeon: Shireen Mayo MD;  Location: [facility] OR 1ST FLR;  Service: Urology;;    ESOPHAGOGASTRODUODENOSCOPY N/A 5/23/2018    Procedure: ESOPHAGOGASTRODUODENOSCOPY (EGD);  Surgeon: Prince Vance MD;  Location: [facility] (4TH FLR);  Service: Endoscopy;  Laterality: N/A;  r/s 'd per Dr. Vance due to family emergency- ER    HYSTERECTOMY  1975    endometriosis    INJECTION OF BOTULINUM TOXIN TYPE A  7/13/2021    Procedure: INJECTION, BOTULINUM TOXIN, 200units;  Surgeon: Shireen Mayo MD;  Location: [facility] OR 1ST FLR;  Service: Urology;;    INJECTION OF BOTULINUM TOXIN TYPE A  11/16/2021    Procedure: INJECTION, BOTULINUM TOXIN, 200units;  Surgeon: Shireen Mayo MD;  Location: [facility] OR 1ST FLR;  Service: Urology;;    pain pump placement      SQ Dilaudid Pump managed by Dr. Hillman, Pain Management    REPLACEMENT OF CATHETER N/A 10/31/2019    Procedure: REPLACEMENT, CATHETER-SUPRAPUBIC;  Surgeon: Shireen Mayo MD;  Location: [facility] OR 1ST FLR;  Service: Urology;  Laterality: N/A;    SPINAL CORD STIMULATOR REMOVAL      before [place]    SPINE SURGERY  5-13-13    CERVICAL FUSION    TONSILLECTOMY         Review of patient's allergies indicates:   Allergen Reactions    (d)-limonene flavor      Other reaction(s): difficult intubation  Other reaction(s): Difficulty breathing    Bactrim [sulfamethoxazole-trimethoprim] Anaphylaxis    Benadryl [diphenhydramine hcl] Shortness Of Breath    Fentanyl Itching, Nausea And Vomiting and Swelling             Imitrex [sumatriptan succinate] Shortness Of Breath    Topamax [topiramate] Shortness Of Breath    Vancomycin Shortness Of Breath     Rash    Butorphanol tartrate     Darvocet a500 [propoxyphene n-acetaminophen]      Other reaction(s): Difficulty breathing    White petrolatum-zinc     Zinc oxide-white petrolatum      Other reaction(s): Difficulty breathing    Latex,  natural rubber Itching and Rash    Phenytoin Rash and Other (See Comments)     Trouble breathing       Family History     Problem Relation (Age of Onset)    Cancer Mother (55), Father    Cirrhosis Paternal Aunt, Paternal Uncle    Colon cancer Maternal Uncle    Esophageal cancer Father    Heart disease Maternal Uncle    Liver disease Paternal Aunt, Paternal Uncle    No Known Problems Brother, Brother, Sister, Maternal Aunt, Maternal Grandfather, Paternal Grandmother, Paternal Grandfather    Parkinsonism Maternal Grandmother    Tremor Maternal Grandmother          Tobacco Use    Smoking status: Never Smoker    Smokeless tobacco: Never Used   Substance and Sexual Activity    Alcohol use: Never    Drug use: No    Sexual activity: Never     Partners: Male       Review of Systems   Constitutional: Positive for activity change and fatigue. Negative for fever.   Gastrointestinal: Positive for abdominal pain. Negative for nausea and vomiting.   Genitourinary: Positive for difficulty urinating. Negative for hematuria and pelvic pain.   Musculoskeletal: Positive for back pain.   Neurological: Negative for dizziness.   Psychiatric/Behavioral: Negative for agitation.       Objective:     Temp:  [97 °F (36.1 °C)-98.7 °F (37.1 °C)] 97 °F (36.1 °C)  Pulse:  [47-77] 50  Resp:  [16-20] 16  SpO2:  [94 %-100 %] 97 %  BP: ()/(47-58) 110/58       NAD  RRR  CTAB  ABD S/ND/NTTP       Existing SPT removed.     Under sterile conditions, 20F SPT placed into bladder with return of clear cloudy urine.  Balloon inflated with 20cc sterile water.  Placed to gravity drainage.        Significant Labs:  BMP:  Recent Labs   Lab 04/16/22  0748 04/17/22  0325 04/18/22  0734    140 141   K 4.3 3.8 3.7    97 94*   CO2 24 31* 35*   BUN 15 16 18   CREATININE 1.9* 2.0* 2.1*   CALCIUM 9.0 8.8 8.9       CBC:  Recent Labs   Lab 04/14/22  1540 04/15/22  0434 04/16/22  0748   WBC 5.48 5.39 4.37   HGB 9.6* 9.1* 9.7*   HCT 29.8* 28.3* 32.6*     201 178       RANDEE: no hydronephrosis    Results for orders placed or performed during the hospital encounter of 02/03/22 (from the past 2160 hour(s))   CT Abdomen Pelvis  Without Contrast    Impression    Suprapubic catheter in place with small amount of bladder wall tissue along its tip.  Some component of contained perforation would be difficult to entirely exclude.  Suggest initial evaluation with bladder ultrasound.    Unremarkable appearance of the kidneys with no evidence of hydroureteronephrosis.    Unchanged large hiatal hernia with mild wall thickening of the proximal aspect of the stomach.    Bibasilar airspace opacities.    Additional findings as above.    This report was flagged in Epic as abnormal.      Electronically signed by: Enmanuel Noble MD  Date:    02/03/2022  Time:    16:01   CT Head Without Contrast    Impression    Within limitations of motion compromised examination, no definite acute intracranial findings are identified.      Electronically signed by: Tomer Zarate  Date:    02/08/2022  Time:    21:12     *Note: Due to a large number of results and/or encounters for the requested time period, some results have not been displayed. A complete set of results can be found in Results Review.       Significant Imaging:  CT: I have reviewed all results within the past 24 hours and my personal findings are:  no hydro      Assessment:     Problem Noted   Urinary Tract Infection Associated With Cystostomy Catheter 5/12/2020   Neurogenic Bladder 9/27/2016   Chi (Acute Kidney Injury) 6/8/2018   Urinary Retention 12/21/2016   Suprapubic Catheter 2/1/2018   Recurrent Uti (Urinary Tract Infection) 6/8/2018     Plan:    1.  NO obstruction on imaging  2. SPT changed, Staph on culture, continue antibiotic   3. Follow up with main campus urologist in 1 month, consider outpt ID evaluation for recurrent UTI  4. Will sign off please call with questions.       Thank you for your consult.     Myles AU  MD Zaira  [facility]

## 2022-04-18 NOTE — PROGRESS NOTES
[facility] Medicine  Progress Note    Patient Name: Tasha Hawley  MRN: [id number]  Patient Class: OP- Observation   Admission Date: 4/14/2022  Length of Stay: 0 days  Attending Physician: Nic Mistry, *  Primary Care Provider: Mesfin Hodges Ii, MD        Subjective:     Principal Problem:CARITO (acute kidney injury)        HPI:  Tasha Hawley is a 65 yo female with a past medical history of Depression, Anemia, Congestive heart failure, Renal disorder, Lumbar radiculopathy, Sleep apnea, Neurogenic bladder in which she has a suprapubic catheter, recurrent UTI, Chronic pain with epidural pain pump, Coronary artery disease, Hypothyroidism, and Sleep apnea. Her PCP is Dr. Mesfin Hodges. She is established with Dr. Karla Amezquita for Nephrology. Dr. Mayo is her Urologist. She has home health with [facility] . She presented to the ED at [facility] via EMS with a cc of generalized body aches for the past 3-4 days. Associated symptoms include poor oral intake, red and orange colored urine, back pain and nausea. She also reports constipation. She denies fever, diarrhea, headaches, or blurred vision.     ED workup revealed WBC UA 78, UA Nitrate +, UA Leukocytes 3+, Hgb 9.6, Hct 29.8, K 3.0, CO2 33, Cr 2.8, Albumin 3.4, ALT 9, Drug screen + Opiate, EKG SB, rate 47, CXR Chronic appearing interstitial findings, no large focal consolidation. IV Rocephin initiated. Urology consulted. Admitted to Hospital medicine for further evaluation and treatment.       Overview/Hospital Course:  No notes on file    Interval History:   still reports significant flank, abdominal, and leg discomfort.  No fevers or chills. Discussed with Urology and exchanged suprapubic catheter today.  We will discuss p.o. antibiotic choices with Infectious Disease for the patient to go home with.    Review of Systems   Constitutional:  Negative for chills and fever.   Respiratory:  Negative for shortness of breath.    Cardiovascular:   Positive for leg swelling. Negative for chest pain.   Gastrointestinal:  Positive for abdominal pain.   Genitourinary:  Positive for dysuria.   Neurological:  Positive for weakness.   Objective:     Vital Signs (Most Recent):  Temp: 97 °F (36.1 °C) (04/18/22 0917)  Pulse: (!) 50 (04/18/22 0917)  Resp: 16 (04/18/22 1129)  BP: (!) 110/58 (manual) (04/18/22 0925)  SpO2: 97 % (04/18/22 0917)   Vital Signs (24h Range):  Temp:  [97 °F (36.1 °C)-98.7 °F (37.1 °C)] 97 °F (36.1 °C)  Pulse:  [47-63] 50  Resp:  [16-20] 16  SpO2:  [94 %-100 %] 97 %  BP: ()/(47-58) 110/58     Weight: 86.8 kg (191 lb 5.8 oz)  Body mass index is 32.85 kg/m².    Intake/Output Summary (Last 24 hours) at 4/18/2022 1244  Last data filed at 4/18/2022 0519  Gross per 24 hour   Intake 1046 ml   Output 2950 ml   Net -1904 ml        Physical Exam  Constitutional:       Appearance: She is ill-appearing.   HENT:      Head: Atraumatic.      Nose: Nose normal.      Mouth/Throat:      Mouth: Mucous membranes are moist.   Eyes:      Pupils: Pupils are equal, round, and reactive to light.   Cardiovascular:      Rate and Rhythm: Bradycardia present.      Pulses: Normal pulses.   Pulmonary:      Effort: Pulmonary effort is normal.   Abdominal:      General: Abdomen is flat. Bowel sounds are normal.      Palpations: Abdomen is soft.      Tenderness: There is right CVA tenderness and left CVA tenderness.      Comments: Suprapubic catheter   Musculoskeletal:         General: Normal range of motion.      Cervical back: Normal range of motion.      Right lower leg: Edema present.      Left lower leg: Edema present.   Skin:     General: Skin is warm and dry.      Capillary Refill: Capillary refill takes less than 2 seconds.   Neurological:      Mental Status: She is alert and oriented to person, place, and time.      Comments: Right facial droop, chronic   Psychiatric:         Mood and Affect: Mood normal.       Significant Labs: All pertinent labs within the past  24 hours have been reviewed.    Significant Imaging: I have reviewed all pertinent imaging results/findings within the past 24 hours.      Assessment/Plan:      * CARITO (acute kidney injury)  - baseline serum Cr 1.1, 2.8 on admit and 5.9 1 week ago  - appears to be slowly improving; UA c/w infection; catheter exchanged today  - renal US without hydro  - continue to monitor renal function with daily labs   - avoid nephrotoxins  - renally dose all medications   - monitor events that may lead to decreased renal perfusion (hypovolemia, hypotension, sepsis)  - monitor urine output to assure that no obstruction precipitates worsening in GFR    Staph aureus infection  - associated with suprapubic catheter  - MSSA  - allergy to vancomycin, will treat with ancef for now, anticipate change to PO abx  - needs 2 week course from today  - ID consult      Abdominal pain  - likely 2/2 UTI  - improving  - will need suprapubic catheter exchanged; urology was consulted    Anxiety  - resume nightly Trazodone   - resume Seroquel    Urinary tract infection associated with cystostomy catheter  - Chronic and recurrent UTIs  - Followed by ID and urology outpatient  - UA Leukocytes 3+  - Rocephin initiated; switched to cefepime given hx of pseudomonas  -switch to linezolid given staph on culture, awaiting sensitivities  -urine with staph aureus; blood cultures NGTD  - Consult urology for catheter change and evaluation, performed today      Bradycardia  - Chronic  - HR 45s-55s, asymptomatic  - Continuous telemetry monitoring       Paresthesia of both lower extremities  - trial low dose gabapentin      Primary hypothyroidism  - Chronic and stable  - Continue levothyroxine 125mcg po before breakfast      Frequent falls  - PT/OT eval and treat: HH at discharge  - Fall precautions  - reinforced to patient to call for assistance      Neurogenic bladder  - Chronic catheter  - Ditropan 15 mg po daily    CHF (congestive heart failure)  - Denies  shortness of breath  - Chronic BLE, bedrest  - CHF pathways  - monitor pulse ox    7/7/2021 TTE    · The left ventricle is normal in size with normal systolic function.  · The estimated ejection fraction is 55%.  · Normal left ventricular diastolic function.  · Mild tricuspid regurgitation.  · Normal right ventricular size with normal right ventricular systolic function.  · The estimated PA systolic pressure is 47 mmHg.  · There is pulmonary hypertension.        Major depressive disorder, recurrent, mild  - Chronic and stable  - Continue chronic SSRI.    Sleep apnea  - CPAP every night        VTE Risk Mitigation (From admission, onward)    None          Discharge Planning   JACKIE: 4/19/2022     Code Status: Full Code   Is the patient medically ready for discharge?:     Reason for patient still in hospital (select all that apply): Patient trending condition                     Nic Mistry MD  Department of Hospital Medicine   [place] [facility]

## 2022-04-18 NOTE — SUBJECTIVE & OBJECTIVE
"Interval History: No event overnight. Patient continues to have generalized abdominal pain and lower back pain. She reports headache, chills (not fever), nausea, constipation (reported no bowel movement for "days"), and some vomiting.     Review of Systems   Constitutional:  Positive for appetite change and chills. Negative for fever.   Respiratory:  Negative for cough and shortness of breath.    Cardiovascular:  Negative for chest pain and leg swelling.   Gastrointestinal:  Positive for abdominal pain, constipation, nausea and vomiting. Negative for diarrhea.   Genitourinary:  Negative for dysuria.   Musculoskeletal:  Positive for back pain. Negative for arthralgias.   Skin:  Negative for rash.   Neurological:  Positive for headaches.   All other systems reviewed and are negative.  Objective:     Vital Signs (Most Recent):  Temp: 97 °F (36.1 °C) (04/18/22 0917)  Pulse: (!) 50 (04/18/22 0917)  Resp: 16 (04/18/22 1129)  BP: (!) 110/58 (manual) (04/18/22 0925)  SpO2: 97 % (04/18/22 0917)   Vital Signs (24h Range):  Temp:  [97 °F (36.1 °C)-98.7 °F (37.1 °C)] 97 °F (36.1 °C)  Pulse:  [47-63] 50  Resp:  [16-20] 16  SpO2:  [94 %-97 %] 97 %  BP: ()/(47-58) 110/58     Weight: 86.8 kg (191 lb 5.8 oz)  Body mass index is 32.85 kg/m².    Estimated Creatinine Clearance: 28.5 mL/min (A) (based on SCr of 2.1 mg/dL (H)).    Physical Exam  Vitals and nursing note reviewed.   Constitutional:       General: She is not in acute distress.     Appearance: She is obese.   HENT:      Head: Normocephalic and atraumatic.      Comments: Hard of hearing     Nose: Nose normal.      Mouth/Throat:      Mouth: Mucous membranes are moist.   Eyes:      General: No scleral icterus.     Conjunctiva/sclera: Conjunctivae normal.   Cardiovascular:      Rate and Rhythm: Regular rhythm. Bradycardia present.      Heart sounds: Normal heart sounds. No murmur heard.  Pulmonary:      Effort: No respiratory distress.      Breath sounds: Normal breath " sounds. No wheezing.   Abdominal:      General: Bowel sounds are normal. There is no distension.      Tenderness: There is abdominal tenderness. There is right CVA tenderness and left CVA tenderness.      Comments: Tender to palpation of lower abdomen; suprapubic catheter exchanged   Musculoskeletal:      Cervical back: Neck supple.      Right lower leg: No edema.      Left lower leg: No edema.      Comments: Lidocaine patch on lower back   Skin:     General: Skin is warm.      Capillary Refill: Capillary refill takes less than 2 seconds.      Findings: No rash.   Neurological:      Mental Status: She is alert. Mental status is at baseline.      Comments: Hearing loss noted   Psychiatric:         Mood and Affect: Mood normal.       Significant Labs: CBC: No results for input(s): WBC, HGB, HCT, PLT in the last 48 hours.  CMP:   Recent Labs   Lab 04/17/22  0325 04/18/22  0734    141   K 3.8 3.7   CL 97 94*   CO2 31* 35*   GLU 88 85   BUN 16 18   CREATININE 2.0* 2.1*   CALCIUM 8.8 8.9   ANIONGAP 12 12   EGFRNONAA 26* 24*     Urine Studies:   Recent Labs   Lab 03/29/22  1545 04/14/22  1713 04/15/22  1100   COLORU Yellow   < > Yellow   APPEARANCEUA Hazy*   < > Hazy*   PHUR 7.0   < > 7.0   SPECGRAV >=1.030*   < > 1.010   PROTEINUA 1+*   < > Trace*   GLUCUA Negative   < > Negative   KETONESU Negative   < > Negative   BILIRUBINUA Negative   < > Negative   OCCULTUA 3+*   < > 3+*   NITRITE Positive*   < > Negative   UROBILINOGEN Negative   < > Negative   LEUKOCYTESUR 3+*   < > 3+*   RBCUA 50*   < > 76*   WBCUA >100*   < > >100*   BACTERIA Moderate*   < > Few*   SQUAMEPITHEL 5   < > 1   HYALINECASTS 0  --   --     < > = values in this interval not displayed.     All pertinent labs within the past 24 hours have been reviewed.    Significant Imaging: I have reviewed all pertinent imaging results/findings within the past 24 hours. None in the past 24 hours.

## 2022-04-18 NOTE — PLAN OF CARE
[facility] - Telemetry  Initial Discharge Assessment       Primary Care Provider: Mesfin Hodges Ii, MD    Admission Diagnosis: Nausea [R11.0]  Weakness [R53.1]  Chronic bilateral low back pain with bilateral sciatica [M54.42, M54.41, G89.29]    Admission Date: 4/14/2022  Expected Discharge Date: 4/19/2022    Consult: CARITO & UTI    Payor: HU[employer] MANAGED MEDICARE / Plan: HU[employer]NA SNP (SPECIAL NEEDS PLAN) / Product Type: Medicare Advantage /     Extended Emergency Contact Information  Primary Emergency Contact: Dangelo Hawley  Address: [address] [place] [address]           [place] [address] [facility]  Home Phone: [phone]  Mobile Phone: [phone]  Relation: Spouse  Secondary Emergency Contact: Lisa Thompson   [place]  Mobile Phone: [phone]  Relation: Daughter    Discharge Plan A: Home Health, Other (IV abx)  Discharge Plan B: Skilled Nursing Facility      [facility]  [address]  [place] [address]  Phone: [phone] Fax: [phone]    [facility] [place] - [facility] - [address]  [address]  [place] [address]  Phone: [phone] Fax: [phone]      Initial Assessment (most recent)       Adult Discharge Assessment - 04/18/22 1220          Discharge Assessment    Assessment Type Discharge Planning Assessment     Confirmed/corrected address, phone number and insurance Yes     Confirmed Demographics Correct on Facesheet     Source of Information patient     Communicated JACKIE with patient/caregiver Date not available/Unable to determine     Lives With spouse   Dangelo Hawley ([phone])    Do you expect to return to your current living situation? Yes     Do you have help at home or someone to help you manage your care at home? Yes     Prior to hospitilization cognitive status: Alert/Oriented     Current cognitive status: Alert/Oriented     Walking or Climbing Stairs Difficulty ambulation difficulty,  requires equipment;transferring difficulty, requires equipment;stair climbing difficulty, requires equipment     Dressing/Bathing Difficulty bathing difficulty, requires equipment     Equipment Currently Used at Home wheelchair;shower chair;grab bar;bedside commode;walker, standard     Readmission within 30 days? No     Patient currently being followed by outpatient case management? Yes; Daniel     Do you currently have service(s) that help you manage your care at home? No     Do you take prescription medications? Yes     Do you have prescription coverage? Yes     Do you have any problems affording any of your prescribed medications? No     Is the patient taking medications as prescribed? yes     How do you get to doctors appointments? family or friend will provide     Are you on dialysis? No     Do you take coumadin? No     Discharge Plan A Home Health;Other   IV abx    Discharge Plan B Skilled Nursing Facility     DME Needed Upon Discharge  other (see comments)   tbd    Discharge Plan discussed with: Patient                   1220  Patient resting quietly in bed when CM rounded. No family present. Patient was admitted with CARITO and UTI & is being followed by urol, ID, & PT/OT. Pt has a hx of neurogenic bladder & has a suprapubic catheter. Pt stated that the suprapubic catheter was replaced today.    Patient lives with her spouse, Dangelo Hawley, has equipment to assist with ADLs, & denied the need for assistance with transportation at time of discharge.     CM informed the patient that she will need 2 weeks IV abx following discharge. Patient verbalized understanding & stated that her spouse has administered IV abx to her in the past. Referral sent to [facility] & [facility]. Awaiting final ID recs & midline placement.     1635  Previously scheduled appointments with Dr. Mesfin Hodges (PCP) on 4/21/2022 at 1100 & Dr. Shireen Mayo (urol) on 5/2/2022 at 1100 noted. CM received confirmation from   Mesfin Hodges's  that the appt on 4/21/2022 can be used as a hospital follow up appointment. Information added to the patient's discharge paperwork.       Will continue to follow.

## 2022-04-18 NOTE — SUBJECTIVE & OBJECTIVE
Interval History:   still reports significant flank, abdominal, and leg discomfort.  No fevers or chills. Discussed with Urology and exchanged suprapubic catheter today.  We will discuss p.o. antibiotic choices with Infectious Disease for the patient to go home with.    Review of Systems   Constitutional:  Negative for chills and fever.   Respiratory:  Negative for shortness of breath.    Cardiovascular:  Positive for leg swelling. Negative for chest pain.   Gastrointestinal:  Positive for abdominal pain.   Genitourinary:  Positive for dysuria.   Neurological:  Positive for weakness.   Objective:     Vital Signs (Most Recent):  Temp: 97 °F (36.1 °C) (04/18/22 0917)  Pulse: (!) 50 (04/18/22 0917)  Resp: 16 (04/18/22 1129)  BP: (!) 110/58 (manual) (04/18/22 0925)  SpO2: 97 % (04/18/22 0917)   Vital Signs (24h Range):  Temp:  [97 °F (36.1 °C)-98.7 °F (37.1 °C)] 97 °F (36.1 °C)  Pulse:  [47-63] 50  Resp:  [16-20] 16  SpO2:  [94 %-100 %] 97 %  BP: ()/(47-58) 110/58     Weight: 86.8 kg (191 lb 5.8 oz)  Body mass index is 32.85 kg/m².    Intake/Output Summary (Last 24 hours) at 4/18/2022 1244  Last data filed at 4/18/2022 0519  Gross per 24 hour   Intake 1046 ml   Output 2950 ml   Net -1904 ml        Physical Exam  Constitutional:       Appearance: She is ill-appearing.   HENT:      Head: Atraumatic.      Nose: Nose normal.      Mouth/Throat:      Mouth: Mucous membranes are moist.   Eyes:      Pupils: Pupils are equal, round, and reactive to light.   Cardiovascular:      Rate and Rhythm: Bradycardia present.      Pulses: Normal pulses.   Pulmonary:      Effort: Pulmonary effort is normal.   Abdominal:      General: Abdomen is flat. Bowel sounds are normal.      Palpations: Abdomen is soft.      Tenderness: There is right CVA tenderness and left CVA tenderness.      Comments: Suprapubic catheter   Musculoskeletal:         General: Normal range of motion.      Cervical back: Normal range of motion.      Right lower  leg: Edema present.      Left lower leg: Edema present.   Skin:     General: Skin is warm and dry.      Capillary Refill: Capillary refill takes less than 2 seconds.   Neurological:      Mental Status: She is alert and oriented to person, place, and time.      Comments: Right facial droop, chronic   Psychiatric:         Mood and Affect: Mood normal.       Significant Labs: All pertinent labs within the past 24 hours have been reviewed.    Significant Imaging: I have reviewed all pertinent imaging results/findings within the past 24 hours.

## 2022-04-18 NOTE — TELEPHONE ENCOUNTER
----- Message from Isabel Magdaleno sent at 4/18/2022 11:35 AM CDT -----  Regarding: speak with nurse  Contact: patient  [phone]   please call patient  said she has been in the hospital for awhile have a bad kidney infection please call number in message thanks.

## 2022-04-18 NOTE — ASSESSMENT & PLAN NOTE
- associated with suprapubic catheter  - MSSA  - allergy to vancomycin, will treat with ancef for now, anticipate change to PO abx  - needs 2 week course from today  - ID consult

## 2022-04-18 NOTE — ASSESSMENT & PLAN NOTE
- baseline serum Cr 1.1, 2.8 on admit and 5.9 1 week ago  - appears to be slowly improving; UA c/w infection; catheter exchanged today  - renal US without hydro  - continue to monitor renal function with daily labs   - avoid nephrotoxins  - renally dose all medications   - monitor events that may lead to decreased renal perfusion (hypovolemia, hypotension, sepsis)  - monitor urine output to assure that no obstruction precipitates worsening in GFR

## 2022-04-19 ENCOUNTER — PATIENT OUTREACH (OUTPATIENT)
Dept: ADMINISTRATIVE | Facility: OTHER | Age: 67
End: 2022-04-19
Payer: MEDICARE

## 2022-04-19 PROBLEM — E66.09 CLASS 1 OBESITY DUE TO EXCESS CALORIES IN ADULT: Status: ACTIVE | Noted: 2022-04-19

## 2022-04-19 PROBLEM — E66.811 CLASS 1 OBESITY DUE TO EXCESS CALORIES IN ADULT: Status: ACTIVE | Noted: 2022-04-19

## 2022-04-19 LAB
ANION GAP SERPL CALC-SCNC: 14 MMOL/L (ref 8–16)
BUN SERPL-MCNC: 18 MG/DL (ref 8–23)
CALCIUM SERPL-MCNC: 9.5 MG/DL (ref 8.7–10.5)
CHLORIDE SERPL-SCNC: 93 MMOL/L (ref 95–110)
CO2 SERPL-SCNC: 33 MMOL/L (ref 23–29)
CREAT SERPL-MCNC: 2.2 MG/DL (ref 0.5–1.4)
EST. GFR  (AFRICAN AMERICAN): 26 ML/MIN/1.73 M^2
EST. GFR  (NON AFRICAN AMERICAN): 23 ML/MIN/1.73 M^2
GLUCOSE SERPL-MCNC: 116 MG/DL (ref 70–110)
MAGNESIUM SERPL-MCNC: 2.2 MG/DL (ref 1.6–2.6)
POCT GLUCOSE: 113 MG/DL (ref 70–110)
POTASSIUM SERPL-SCNC: 3.5 MMOL/L (ref 3.5–5.1)
SODIUM SERPL-SCNC: 140 MMOL/L (ref 136–145)

## 2022-04-19 PROCEDURE — 83735 ASSAY OF MAGNESIUM: CPT | Performed by: HOSPITALIST

## 2022-04-19 PROCEDURE — 97535 SELF CARE MNGMENT TRAINING: CPT | Mod: CO

## 2022-04-19 PROCEDURE — 25000003 PHARM REV CODE 250: Performed by: HOSPITALIST

## 2022-04-19 PROCEDURE — 63600175 PHARM REV CODE 636 W HCPCS: Performed by: HOSPITALIST

## 2022-04-19 PROCEDURE — 97110 THERAPEUTIC EXERCISES: CPT | Mod: CQ

## 2022-04-19 PROCEDURE — 36415 COLL VENOUS BLD VENIPUNCTURE: CPT | Performed by: HOSPITALIST

## 2022-04-19 PROCEDURE — 11000001 HC ACUTE MED/SURG PRIVATE ROOM

## 2022-04-19 PROCEDURE — 97116 GAIT TRAINING THERAPY: CPT | Mod: CQ

## 2022-04-19 PROCEDURE — 80048 BASIC METABOLIC PNL TOTAL CA: CPT | Performed by: HOSPITALIST

## 2022-04-19 PROCEDURE — 63600175 PHARM REV CODE 636 W HCPCS: Performed by: NURSE PRACTITIONER

## 2022-04-19 PROCEDURE — 25000003 PHARM REV CODE 250: Performed by: NURSE PRACTITIONER

## 2022-04-19 PROCEDURE — 97530 THERAPEUTIC ACTIVITIES: CPT | Mod: CO

## 2022-04-19 RX ORDER — MUPIROCIN 20 MG/G
OINTMENT TOPICAL 2 TIMES DAILY
Status: DISPENSED | OUTPATIENT
Start: 2022-04-19 | End: 2022-04-24

## 2022-04-19 RX ORDER — CEPHALEXIN 500 MG/1
500 CAPSULE ORAL EVERY 12 HOURS
Status: DISCONTINUED | OUTPATIENT
Start: 2022-04-19 | End: 2022-04-19

## 2022-04-19 RX ORDER — CEFAZOLIN SODIUM 2 G/50ML
2 SOLUTION INTRAVENOUS
Status: DISCONTINUED | OUTPATIENT
Start: 2022-04-19 | End: 2022-04-22

## 2022-04-19 RX ADMIN — OXYBUTYNIN CHLORIDE 15 MG: 5 TABLET, EXTENDED RELEASE ORAL at 09:04

## 2022-04-19 RX ADMIN — LEVOTHYROXINE SODIUM 125 MCG: 0.03 TABLET ORAL at 05:04

## 2022-04-19 RX ADMIN — PROMETHAZINE HYDROCHLORIDE 12.5 MG: 25 INJECTION INTRAMUSCULAR; INTRAVENOUS at 03:04

## 2022-04-19 RX ADMIN — FLUOXETINE HYDROCHLORIDE 60 MG: 20 CAPSULE ORAL at 09:04

## 2022-04-19 RX ADMIN — TRAZODONE HYDROCHLORIDE 300 MG: 100 TABLET ORAL at 08:04

## 2022-04-19 RX ADMIN — ACETAMINOPHEN 650 MG: 325 TABLET ORAL at 02:04

## 2022-04-19 RX ADMIN — ONDANSETRON 4 MG: 2 INJECTION INTRAMUSCULAR; INTRAVENOUS at 04:04

## 2022-04-19 RX ADMIN — ATORVASTATIN CALCIUM 80 MG: 40 TABLET, FILM COATED ORAL at 09:04

## 2022-04-19 RX ADMIN — SODIUM CHLORIDE, SODIUM LACTATE, POTASSIUM CHLORIDE, AND CALCIUM CHLORIDE 500 ML: .6; .31; .03; .02 INJECTION, SOLUTION INTRAVENOUS at 06:04

## 2022-04-19 RX ADMIN — SENNOSIDES 2 TABLET: 8.6 TABLET, FILM COATED ORAL at 08:04

## 2022-04-19 RX ADMIN — GABAPENTIN 100 MG: 100 CAPSULE ORAL at 08:04

## 2022-04-19 RX ADMIN — SODIUM CHLORIDE, SODIUM LACTATE, POTASSIUM CHLORIDE, AND CALCIUM CHLORIDE 500 ML: .6; .31; .03; .02 INJECTION, SOLUTION INTRAVENOUS at 12:04

## 2022-04-19 RX ADMIN — CEPHALEXIN 500 MG: 500 CAPSULE ORAL at 09:04

## 2022-04-19 RX ADMIN — OXYCODONE 5 MG: 5 TABLET ORAL at 10:04

## 2022-04-19 RX ADMIN — QUETIAPINE FUMARATE 200 MG: 100 TABLET ORAL at 08:04

## 2022-04-19 RX ADMIN — POTASSIUM BICARBONATE 25 MEQ: 977.5 TABLET, EFFERVESCENT ORAL at 12:04

## 2022-04-19 RX ADMIN — SENNOSIDES 2 TABLET: 8.6 TABLET, FILM COATED ORAL at 09:04

## 2022-04-19 RX ADMIN — CEFAZOLIN SODIUM 2 G: 2 SOLUTION INTRAVENOUS at 05:04

## 2022-04-19 RX ADMIN — OXYCODONE 5 MG: 5 TABLET ORAL at 04:04

## 2022-04-19 RX ADMIN — LIDOCAINE 1 PATCH: 50 PATCH CUTANEOUS at 03:04

## 2022-04-19 RX ADMIN — PANTOPRAZOLE SODIUM 40 MG: 40 TABLET, DELAYED RELEASE ORAL at 09:04

## 2022-04-19 RX ADMIN — POLYETHYLENE GLYCOL 3350 17 G: 17 POWDER, FOR SOLUTION ORAL at 09:04

## 2022-04-19 NOTE — HOSPITAL COURSE
Ms. Hawley was admitted to Hospital Medicine for management of a UTI.  She was started on Ceftriaxone.  Urology was consulted.  She was found to have MSSA and was changed to IV Ancef.  ID suggested 2 weeks for a complicated UTI, end date 5/2 - with plans to switch to PO Keflex on DC.  She continued to suffer from CARITO, hypotension, and bradycardia.  2D echo was ordered which was normal.  Her Torsemide was held.  She was given small IVF boluses with improvement in her BP and Creatinine. HR continued to remain low, which was thought to be 2/2 her Dilaudid pain pump.  BP and Creatinine eventually improved. Patient cleared for discharge home.    See individual problem list.

## 2022-04-19 NOTE — ASSESSMENT & PLAN NOTE
· Creatinine 2.8 on admit, baseline around 1.1 - was 5.9 a week ago - 2.2 today  · Has been on Torsemide, but also with decreased PO intake  · Renal US without hydro  · Stop Torsemide for now  · Gentle IVF bolus today

## 2022-04-19 NOTE — PLAN OF CARE
Problem: Occupational Therapy  Goal: Occupational Therapy Goal  Description: Goals to be met by: 4/30/2022    Patient will increase functional independence with ADLs by performing:    UE Dressing with Modified [place].  LE Dressing with Minimal Assistance.  Grooming while seated with Modified [place].  Toileting from bedside commode with Stand-by Assistance for hygiene and clothing management.   Supine to sit with Modified [place].  Step transfer with Stand-by Assistance  Toilet transfer to bedside commode with Stand-by Assistance.  Increased functional strength to WFL for ADLS.  Upper extremity exercise program x 10 reps per handout, with supervision.    Outcome: Ongoing, Progressing     Patient in better spirits today and tolerated activity well. Patient will benefit from continued skilled OT to address deficits and improve performance in functional ADL tasks. Cont OT.

## 2022-04-19 NOTE — ASSESSMENT & PLAN NOTE
· MSSA UTI associated with suprapubic catheter, meadows changed in the ER  · ID consulted:  Suggest 2 week course end date 5/2 - Ancef then PO Keflex

## 2022-04-19 NOTE — PROGRESS NOTES
[facility] Medicine  Progress Note    Patient Name: Tasha Hawley  MRN: [id number]  Patient Class: IP- Inpatient   Admission Date: 4/14/2022  Length of Stay: 1 days  Attending Physician: Rebecca Condon MD  Primary Care Provider: Mesfin Hodges Ii, MD        Subjective:     Principal Problem:CARITO (acute kidney injury)        HPI:  Tasha Hawley is a 67 yo female with a past medical history of Depression, Anemia, Congestive heart failure, Renal disorder, Lumbar radiculopathy, Sleep apnea, Neurogenic bladder in which she has a suprapubic catheter, recurrent UTI, Chronic pain with epidural pain pump, Coronary artery disease, Hypothyroidism, and Sleep apnea. Her PCP is Dr. Mesfin Hodges. She is established with Dr. Karla Amezquita for Nephrology. Dr. Mayo is her Urologist. She has [facility] health with [facility] . She presented to the ED at [facility] via EMS with a cc of generalized body aches for the past 3-4 days. Associated symptoms include poor oral intake, red and orange colored urine, back pain and nausea. She also reports constipation. She denies fever, diarrhea, headaches, or blurred vision.     ED workup revealed WBC UA 78, UA Nitrate +, UA Leukocytes 3+, Hgb 9.6, Hct 29.8, K 3.0, CO2 33, Cr 2.8, Albumin 3.4, ALT 9, Drug screen + Opiate, EKG SB, rate 47, CXR Chronic appearing interstitial findings, no large focal consolidation. IV Rocephin initiated. Urology consulted. Admitted to Hospital medicine for further evaluation and treatment.       Overview/Hospital Course:  Ms. Hawley was admitted to Hospital Medicine for management of a UTI.  She was started on Ceftriaxone.  Urology was consulted.  She was found to have MSSA and was changed to IV Ancef.  ID suggested 2 weeks for a complicated UTI, end date 5/2 - with plans to switch to PO Keflex on DC.        Interval History: No acute events overnight.  Reports still having nausea and abdominal pain.  Will change Zofran to Phenergan. Holding  Torsemide given BP 80/40s and persistent CARITO    Review of Systems   Constitutional:  Negative for chills, fatigue and fever.   Respiratory:  Negative for cough and shortness of breath.    Cardiovascular:  Negative for chest pain, palpitations and leg swelling.   Gastrointestinal:  Positive for abdominal pain and nausea. Negative for diarrhea and vomiting.   Genitourinary:  Negative for dysuria and urgency.   Neurological:  Negative for dizziness and headaches.   All other systems reviewed and are negative.  Objective:     Vital Signs (Most Recent):  Temp: 97.2 °F (36.2 °C) (04/19/22 0853)  Pulse: (!) 47 (04/19/22 0853)  Resp: 18 (04/19/22 1018)  BP: (!) 86/44 (04/19/22 0853)  SpO2: 97 % (04/19/22 0853)   Vital Signs (24h Range):  Temp:  [97 °F (36.1 °C)-97.8 °F (36.6 °C)] 97.2 °F (36.2 °C)  Pulse:  [43-67] 47  Resp:  [16-18] 18  SpO2:  [93 %-97 %] 97 %  BP: ()/(44-56) 86/44     Weight: 86.8 kg (191 lb 5.8 oz)  Body mass index is 32.85 kg/m².    Intake/Output Summary (Last 24 hours) at 4/19/2022 1114  Last data filed at 4/19/2022 0547  Gross per 24 hour   Intake 240 ml   Output 1850 ml   Net -1610 ml      Physical Exam  Constitutional:       Appearance: Normal appearance. She is well-developed. She is obese.   HENT:      Head: Normocephalic and atraumatic.   Cardiovascular:      Rate and Rhythm: Regular rhythm. Bradycardia present.      Heart sounds: No murmur heard.  Pulmonary:      Effort: Pulmonary effort is normal. No respiratory distress.      Breath sounds: Normal breath sounds. No wheezing or rales.   Abdominal:      General: There is no distension.      Palpations: Abdomen is soft.      Tenderness: There is abdominal tenderness (Mideipigastrium).   Musculoskeletal:         General: No deformity.   Skin:     General: Skin is warm.   Neurological:      General: No focal deficit present.      Mental Status: She is alert and oriented to person, place, and time. Mental status is at baseline.        Significant Labs: All pertinent labs within the past 24 hours have been reviewed.  BMP:   Recent Labs   Lab 04/19/22  0514   *      K 3.5   CL 93*   CO2 33*   BUN 18   CREATININE 2.2*   CALCIUM 9.5     Urine Culture: No results for input(s): LABURIN in the last 48 hours.    Significant Imaging: I have reviewed all pertinent imaging results/findings within the past 24 hours.      Assessment/Plan:      * CARITO (acute kidney injury)  · Creatinine 2.8 on admit, baseline around 1.1 - was 5.9 a week ago - 2.2 today  · Has been on Torsemide, but also with decreased PO intake  · Renal US without hydro  · Stop Torsemide for now  · Gentle IVF bolus today    Staph aureus infection  · MSSA UTI associated with suprapubic catheter, meadows changed in the ER  · ID consulted:  Suggest 2 week course end date 5/2 - Ancef then PO Keflex    Abdominal pain  · Likely 2/2 UTI  · Check Lipase    Urinary tract infection associated with cystostomy catheter  · Chronic and recurrent UTIs  · Followed by ID and urology outpatient  · Meadows changed in the ER  · Management as above    Neurogenic bladder  · Chronic catheter  · Continue Ditropan 15mg PO daily    Class 1 obesity due to excess calories in adult  · Body mass index is 32.85 kg/m².  · Encourage diet, weight loss, exercise      Anxiety  · Chronic and stable  · Continue Trazodone and Seroquel    Bradycardia  · Chronic issue  · HR 45s-55s, asymptomatic  · Continuous telemetry monitoring   · Keep Mag >2, K >4      Paresthesia of both lower extremities  · Trial low dose Gabapentin      Primary hypothyroidism  · Chronic and stable  · Continue Levothyroxine 125mcg po before breakfast      Frequent falls  · PT/OT eval and treat: HH at discharge  · Fall precautions  · Reinforced to patient to call for assistance      CHF (congestive heart failure)  - Denies shortness of breath  - Chronic BLE, bedrest  - CHF pathways  - monitor pulse ox    7/7/2021 TTE    · The left ventricle is  normal in size with normal systolic function.  · The estimated ejection fraction is 55%.  · Normal left ventricular diastolic function.  · Mild tricuspid regurgitation.  · Normal right ventricular size with normal right ventricular systolic function.  · The estimated PA systolic pressure is 47 mmHg.  · There is pulmonary hypertension.        Major depressive disorder, recurrent, mild  · Chronic and stable  · Continue SSRI    Sleep apnea  · CPAP every night        VTE Risk Mitigation (From admission, onward)    None          Discharge Planning   JACKIE: 4/20/2022     Code Status: Full Code   Is the patient medically ready for discharge?:     Reason for patient still in hospital (select all that apply): Patient trending condition  Discharge Plan A: Home Health, Other (IV abx)                  Rebecca Condon MD  Department of Hospital Medicine   [facility]

## 2022-04-19 NOTE — PT/OT/SLP PROGRESS
Physical Therapy Treatment    Patient Name:  Tasha Hawley   MRN:  [id number]    Recommendations:     Discharge Recommendations:  home health PT   Discharge Equipment Recommendations: none   Barriers to discharge: decreased mobility,strength and endurance    Assessment:     Tasha Hawley is a 66 y.o. female admitted with a medical diagnosis of CARITO (acute kidney injury).  She presents with the following impairments/functional limitations:  weakness, impaired endurance, impaired functional mobilty, gait instability, impaired balance, decreased lower extremity function, decreased upper extremity function, decreased safety awareness, pain, impaired skin,pt with good participation and limited by chronic back pain with kyphotic posture,pt will benefit from from continuing PT services upon discharge to address above functional limitations.    Rehab Prognosis: Fair; patient would benefit from acute skilled PT services to address these deficits and reach maximum level of function.    Recent Surgery: * No surgery found *      Plan:     During this hospitalization, patient to be seen 5 x/week to address the identified rehab impairments via gait training, therapeutic activities, therapeutic exercises, neuromuscular re-education and progress toward the following goals:    · Plan of Care Expires:  05/15/22    Subjective     Chief Complaint: back pain  Patient/Family Comments/goals: pt agreeable to rx.  Pain/Comfort:  · Pain Rating 1:  (no rating)  · Location - Orientation 1: lower  · Location 1: back (chronic pain)  · Pain Addressed 1: Reposition, Distraction, Cessation of Activity      Objective:     Communicated with nsg prior to session.  Patient found up in chair with meadows catheter, peripheral IV, telemetry upon PT entry to room.     General Precautions: Standard, fall, hearing impaired   Orthopedic Precautions:N/A   Braces: N/A  Respiratory Status: Room air     Functional Mobility:  · Transfers:     · Sit to Stand:   stand by assistance with rolling walker  · Gait: amb ~80' with RW and CGA and kyphotic posture and IV in tow  · Balance: fair standing balance with RW      AM-PAC 6 CLICK MOBILITY  Turning over in bed (including adjusting bedclothes, sheets and blankets)?: 4  Sitting down on and standing up from a chair with arms (e.g., wheelchair, bedside commode, etc.): 3  Moving from lying on back to sitting on the side of the bed?: 4  Moving to and from a bed to a chair (including a wheelchair)?: 3  Need to walk in hospital room?: 3  Climbing 3-5 steps with a railing?: 1  Basic Mobility Total Score: 18       Therapeutic Activities and Exercises: le seated ex's x 10-12 reps inc: ap,laq,hip flex.       Patient left up in chair with all lines intact, call button in reach and nsg notified..    GOALS: see general POC  Multidisciplinary Problems     Physical Therapy Goals        Problem: Physical Therapy    Goal Priority Disciplines Outcome Goal Variances Interventions   Physical Therapy Goal     PT, PT/OT Ongoing, Progressing     Description: Goals to be met by: discharge date     Patient will increase functional independence with mobility by performin. Supine to sit with Modified [place]  2. Sit to supine with Modified [place]  3. Sit to stand transfer with Modified [place]  4. Bed to chair transfer with Modified [place] and Supervision using Rolling Walker and janeen OOB chair > 1 hr  5. Gait  x 100 feet with Stand-by Assistance using Rolling Walker.   6. Lower extremity exercise program x15 reps                    Time Tracking:     PT Received On: 22  PT Start Time: 1339     PT Stop Time: 1406  PT Total Time (min): 27 min     Billable Minutes: Gait Training 17 and Therapeutic Exercise 10    Treatment Type: Treatment  PT/PTA: PTA     PTA Visit Number: 1     2022

## 2022-04-19 NOTE — PLAN OF CARE
Problem: Physical Therapy  Goal: Physical Therapy Goal  Description: Goals to be met by: discharge date     Patient will increase functional independence with mobility by performin. Supine to sit with Modified [place]  2. Sit to supine with Modified [place]  3. Sit to stand transfer with Modified [place]  4. Bed to chair transfer with Modified [place] and Supervision using Rolling Walker and janeen OOB chair > 1 hr  5. Gait  x 100 feet with Stand-by Assistance using Rolling Walker.   6. Lower extremity exercise program x15 reps   Outcome: Ongoing, Progressing

## 2022-04-19 NOTE — PLAN OF CARE
1019  ARTURO was informed by Dr Condon that the patient will discharge home with home health & PO abx when medically stable. Message sent to [facility] via [place] informing of above. Osei BHAT following the patient.     1235  Patient awake & alert sitting in recliner eating lunch when CM rounded. No family present. CM informed the patient that she will dc on PO abx. Patient voiced concern regarding being able to tolerate PO abx.       Will continue to follow.

## 2022-04-19 NOTE — PT/OT/SLP PROGRESS
Occupational Therapy   Treatment    Name: Tasha Hawley  MRN: [id number]  Admitting Diagnosis:  CARITO (acute kidney injury)       Recommendations:     Discharge Recommendations: home health OT, home health PT  Discharge Equipment Recommendations:  none  Barriers to discharge:  None    Assessment:   Patient in better spirits today and tolerated activity well. Patient will benefit from continued skilled OT to address deficits and improve performance in functional ADL tasks. Cont OT.    Tasha Hawley is a 66 y.o. female with a medical diagnosis of CARITO (acute kidney injury).  Performance deficits affecting function are weakness, impaired endurance, impaired self care skills, impaired functional mobilty, gait instability, impaired balance, decreased upper extremity function, decreased lower extremity function, decreased ROM, pain, impaired joint extensibility.     Rehab Prognosis:  Fair; patient would benefit from acute skilled OT services to address these deficits and reach maximum level of function.       Plan:     Patient to be seen 3 x/week to address the above listed problems via self-care/home management, therapeutic activities, therapeutic exercises  · Plan of Care Expires: 05/15/22  · Plan of Care Reviewed with: patient    Subjective     Pain/Comfort:  · Pain Rating 1:  (did not rate, but reports chronic back pain)    Objective:     Communicated with: Rebecca fernandez prior to session.  Patient found HOB elevated with bed alarm, telemetry, SCD (suprapubic catheter) upon OT entry to room.    General Precautions: Standard, fall, hearing impaired     Bed Mobility:    · Patient completed Rolling/Turning to Left with  stand by assistance and with side rail  · Patient completed Scooting/Bridging with stand by assistance  · Patient completed Supine to Sit with stand by assistance, with side rail and increased time/effort     Functional Mobility/Transfers:  · Patient completed Sit <> Stand Transfer with stand by  assistance  with  rolling walker   · Patient completed Bed <> Chair Transfer using Step Transfer technique with contact guard assistance with rolling walker    Activities of Daily Living:  · Grooming: stand by assistance in stance at sink   · Patient /c shirt and pants donned prior to MUSE arrival    [facility] 6 Click ADL: 20    Treatment & Education:  Patient reported chronic back pain, but agreeable and eager for OOB. Patient requires increased time, effort and VCs for all tasks and transitions 2/2 pain. Ambulated to bathroom using RW /c SBA, /c flexed/kyphotic posture. Completed various G/H tasks in stance at sink, at times resting forearms on counter. Patient then ambulated to bedside chair (PCT changing bed linens). Patient completed hair grooming and donned deodorant while in chair.     Patient left up in chair with all lines intact, call button in reach, nsg notified and PCT presentEducation:      GOALS:   Multidisciplinary Problems     Occupational Therapy Goals        Problem: Occupational Therapy    Goal Priority Disciplines Outcome Interventions   Occupational Therapy Goal     OT, PT/OT Ongoing, Progressing    Description: Goals to be met by: 4/30/2022    Patient will increase functional independence with ADLs by performing:    UE Dressing with Modified [place].  LE Dressing with Minimal Assistance.  Grooming while seated with Modified [place].  Toileting from bedside commode with Stand-by Assistance for hygiene and clothing management.   Supine to sit with Modified [place].  Step transfer with Stand-by Assistance  Toilet transfer to bedside commode with Stand-by Assistance.  Increased functional strength to WFL for ADLS.  Upper extremity exercise program x 10 reps per handout, with supervision.                     Time Tracking:     OT Date of Treatment: 04/19/22  OT Start Time: 0941  OT Stop Time: 1012  OT Total Time (min): 31 min    Billable Minutes:Self Care/Home Management 21  Therapeutic  Activity 10    OT/SAMANTHA: SAMANTHA     SAMANTHA Visit Number: 1    4/19/2022

## 2022-04-19 NOTE — ASSESSMENT & PLAN NOTE
· Chronic issue  · HR 45s-55s, asymptomatic  · Continuous telemetry monitoring   · Keep Mag >2, K >4

## 2022-04-19 NOTE — ASSESSMENT & PLAN NOTE
· Chronic and recurrent UTIs  · Followed by ID and urology outpatient  · Motley changed in the ER  · Management as above

## 2022-04-19 NOTE — PROGRESS NOTES
IP Liaison - Initial Visit Note    Patient: Tasha Hawley  MRN:  [id number]  Date of Service:  4/19/2022  Completed by:  ML Weir    Reason for Visit   Patient presents with    IP Liaison Initial Visit       RSW contacted pt via room phone in order to complete SDOH questionnaire and liaison assessment.  Pt has identified no immediate social barriers to care at this time. Pt interested in [place] on Aging information. Medical staff entered room at time of assessment, pt unable to complete full assessment. RSW has added [facility] [place] [facility] to pt AVS, pt expressed understanding.     The following were addressed during this visit:  - Review SDOH Questions   - Complete patient assessment   - Complete initial visit with patient        Patient Summary     IP Liaison Patient Assessment    General  Level of Caregiver support: Member independent and does not need caregiver assistance  Have you had to make a decision between paying for any of the following in the last 2 months?: None  Transportation means: Family  Employment status: Disabled  Assessments  Was the PHQ Depression Screening completed this visit?: No  Was the GONZALO-7 Screening completed this visit?: No         ML Weir

## 2022-04-19 NOTE — SUBJECTIVE & OBJECTIVE
Interval History: No acute events overnight.  Reports still having nausea and abdominal pain.  Will change Zofran to Phenergan. Holding Torsemide given BP 80/40s and persistent CARITO    Review of Systems   Constitutional:  Negative for chills, fatigue and fever.   Respiratory:  Negative for cough and shortness of breath.    Cardiovascular:  Negative for chest pain, palpitations and leg swelling.   Gastrointestinal:  Positive for abdominal pain and nausea. Negative for diarrhea and vomiting.   Genitourinary:  Negative for dysuria and urgency.   Neurological:  Negative for dizziness and headaches.   All other systems reviewed and are negative.  Objective:     Vital Signs (Most Recent):  Temp: 97.2 °F (36.2 °C) (04/19/22 0853)  Pulse: (!) 47 (04/19/22 0853)  Resp: 18 (04/19/22 1018)  BP: (!) 86/44 (04/19/22 0853)  SpO2: 97 % (04/19/22 0853)   Vital Signs (24h Range):  Temp:  [97 °F (36.1 °C)-97.8 °F (36.6 °C)] 97.2 °F (36.2 °C)  Pulse:  [43-67] 47  Resp:  [16-18] 18  SpO2:  [93 %-97 %] 97 %  BP: ()/(44-56) 86/44     Weight: 86.8 kg (191 lb 5.8 oz)  Body mass index is 32.85 kg/m².    Intake/Output Summary (Last 24 hours) at 4/19/2022 1114  Last data filed at 4/19/2022 0547  Gross per 24 hour   Intake 240 ml   Output 1850 ml   Net -1610 ml      Physical Exam  Constitutional:       Appearance: Normal appearance. She is well-developed. She is obese.   HENT:      Head: Normocephalic and atraumatic.   Cardiovascular:      Rate and Rhythm: Regular rhythm. Bradycardia present.      Heart sounds: No murmur heard.  Pulmonary:      Effort: Pulmonary effort is normal. No respiratory distress.      Breath sounds: Normal breath sounds. No wheezing or rales.   Abdominal:      General: There is no distension.      Palpations: Abdomen is soft.      Tenderness: There is abdominal tenderness (Mideipigastrium).   Musculoskeletal:         General: No deformity.   Skin:     General: Skin is warm.   Neurological:      General: No focal  deficit present.      Mental Status: She is alert and oriented to person, place, and time. Mental status is at baseline.       Significant Labs: All pertinent labs within the past 24 hours have been reviewed.  BMP:   Recent Labs   Lab 04/19/22  0514   *      K 3.5   CL 93*   CO2 33*   BUN 18   CREATININE 2.2*   CALCIUM 9.5     Urine Culture: No results for input(s): LABURIN in the last 48 hours.    Significant Imaging: I have reviewed all pertinent imaging results/findings within the past 24 hours.

## 2022-04-19 NOTE — ASSESSMENT & PLAN NOTE
· PT/OT eval and treat: HH at discharge  · Fall precautions  · Reinforced to patient to call for assistance

## 2022-04-19 NOTE — PHARMACY MED REC
"Admission Medication History     The home medication history was taken by Genny Eugene PharmD.    Medication history obtained from patient    You may go to "Admission" then "Reconcile Home Medications" tabs to review and/or act upon these items.      The home medication list has been updated by the Pharmacy department.    Please read ALL comments highlighted in yellow.    Please address this information as you see fit.     Feel free to contact us if you have any questions or require assistance.      The medications listed below were removed from the home medication list.  Please reorder if appropriate:    o Patient reports he/she IS TAKING the following which was not ordered upon admit  o Aspirin 81mg daily  o Fioricet   o Levothyroxine 125mcg daily  o Torsemide 100mg daily        Genny Eugene PharmD.                  .          "

## 2022-04-20 ENCOUNTER — PATIENT OUTREACH (OUTPATIENT)
Dept: ADMINISTRATIVE | Facility: OTHER | Age: 67
End: 2022-04-20
Payer: MEDICARE

## 2022-04-20 LAB
ALBUMIN SERPL BCP-MCNC: 3.1 G/DL (ref 3.5–5.2)
ALP SERPL-CCNC: 66 U/L (ref 55–135)
ALT SERPL W/O P-5'-P-CCNC: <5 U/L (ref 10–44)
ANION GAP SERPL CALC-SCNC: 11 MMOL/L (ref 8–16)
AORTIC ROOT ANNULUS: 2.83 CM
AST SERPL-CCNC: 18 U/L (ref 10–40)
AV INDEX (PROSTH): 0.78
AV MEAN GRADIENT: 4 MMHG
AV PEAK GRADIENT: 7 MMHG
AV VALVE AREA: 2 CM2
AV VELOCITY RATIO: 0.69
BILIRUB SERPL-MCNC: 0.2 MG/DL (ref 0.1–1)
BSA FOR ECHO PROCEDURE: 1.98 M2
BUN SERPL-MCNC: 20 MG/DL (ref 8–23)
CALCIUM SERPL-MCNC: 9.4 MG/DL (ref 8.7–10.5)
CHLORIDE SERPL-SCNC: 95 MMOL/L (ref 95–110)
CO2 SERPL-SCNC: 34 MMOL/L (ref 23–29)
CREAT SERPL-MCNC: 2 MG/DL (ref 0.5–1.4)
CV ECHO LV RWT: 0.29 CM
D DIMER PPP IA.FEU-MCNC: 1.93 MG/L FEU
DOP CALC AO PEAK VEL: 1.34 M/S
DOP CALC AO VTI: 29.79 CM
DOP CALC LVOT AREA: 2.6 CM2
DOP CALC LVOT DIAMETER: 1.81 CM
DOP CALC LVOT PEAK VEL: 0.92 M/S
DOP CALC LVOT STROKE VOLUME: 59.61 CM3
DOP CALC MV VTI: 40.95 CM
DOP CALCLVOT PEAK VEL VTI: 23.18 CM
E WAVE DECELERATION TIME: 195.95 MSEC
E/A RATIO: 1.56
E/E' RATIO: 14.29 M/S
ECHO LV POSTERIOR WALL: 0.79 CM (ref 0.6–1.1)
EJECTION FRACTION: 55 %
EST. GFR  (AFRICAN AMERICAN): 30 ML/MIN/1.73 M^2
EST. GFR  (NON AFRICAN AMERICAN): 26 ML/MIN/1.73 M^2
FRACTIONAL SHORTENING: 37 % (ref 28–44)
GLUCOSE SERPL-MCNC: 86 MG/DL (ref 70–110)
INTERVENTRICULAR SEPTUM: 0.67 CM (ref 0.6–1.1)
LA MAJOR: 7.17 CM
LA MINOR: 6.23 CM
LA WIDTH: 5.05 CM
LEFT ATRIUM SIZE: 3.96 CM
LEFT ATRIUM VOLUME INDEX MOD: 60.9 ML/M2
LEFT ATRIUM VOLUME INDEX: 59 ML/M2
LEFT ATRIUM VOLUME MOD: 116.84 CM3
LEFT ATRIUM VOLUME: 113.33 CM3
LEFT INTERNAL DIMENSION IN SYSTOLE: 3.44 CM (ref 2.1–4)
LEFT VENTRICLE DIASTOLIC VOLUME INDEX: 75.36 ML/M2
LEFT VENTRICLE DIASTOLIC VOLUME: 144.69 ML
LEFT VENTRICLE MASS INDEX: 73 G/M2
LEFT VENTRICLE SYSTOLIC VOLUME INDEX: 25.5 ML/M2
LEFT VENTRICLE SYSTOLIC VOLUME: 48.94 ML
LEFT VENTRICULAR INTERNAL DIMENSION IN DIASTOLE: 5.46 CM (ref 3.5–6)
LEFT VENTRICULAR MASS: 140.88 G
LIPASE SERPL-CCNC: 16 U/L (ref 4–60)
LV LATERAL E/E' RATIO: 11.11 M/S
LV SEPTAL E/E' RATIO: 20 M/S
MAGNESIUM SERPL-MCNC: 1.8 MG/DL (ref 1.6–2.6)
MV PEAK A VEL: 0.64 M/S
MV PEAK E VEL: 1 M/S
MV PEAK GRADIENT: 4 MMHG
MV STENOSIS PRESSURE HALF TIME: 56.83 MS
MV VALVE AREA BY CONTINUITY EQUATION: 1.46 CM2
MV VALVE AREA P 1/2 METHOD: 3.87 CM2
PISA MRMAX VEL: 0.03 M/S
PISA TR MAX VEL: 2.63 M/S
POCT GLUCOSE: 118 MG/DL (ref 70–110)
POTASSIUM SERPL-SCNC: 4 MMOL/L (ref 3.5–5.1)
PROT SERPL-MCNC: 6 G/DL (ref 6–8.4)
PULM VEIN S/D RATIO: 1.52
PV PEAK D VEL: 0.31 M/S
PV PEAK S VEL: 0.47 M/S
PV PEAK VELOCITY: 0.76 CM/S
RA MAJOR: 5.97 CM
RA PRESSURE: 8 MMHG
RA WIDTH: 3.44 CM
RIGHT VENTRICULAR END-DIASTOLIC DIMENSION: 2.47 CM
RV TISSUE DOPPLER FREE WALL SYSTOLIC VELOCITY 1 (APICAL 4 CHAMBER VIEW): 9.92 CM/S
SODIUM SERPL-SCNC: 140 MMOL/L (ref 136–145)
STJ: 2.8 CM
TDI LATERAL: 0.09 M/S
TDI SEPTAL: 0.05 M/S
TDI: 0.07 M/S
TR MAX PG: 28 MMHG
TROPONIN I SERPL DL<=0.01 NG/ML-MCNC: <0.006 NG/ML (ref 0–0.03)
TV REST PULMONARY ARTERY PRESSURE: 36 MMHG

## 2022-04-20 PROCEDURE — 80053 COMPREHEN METABOLIC PANEL: CPT | Performed by: HOSPITALIST

## 2022-04-20 PROCEDURE — 85379 FIBRIN DEGRADATION QUANT: CPT | Performed by: HOSPITALIST

## 2022-04-20 PROCEDURE — 11000001 HC ACUTE MED/SURG PRIVATE ROOM

## 2022-04-20 PROCEDURE — 94640 AIRWAY INHALATION TREATMENT: CPT

## 2022-04-20 PROCEDURE — 25000003 PHARM REV CODE 250: Performed by: HOSPITALIST

## 2022-04-20 PROCEDURE — 97110 THERAPEUTIC EXERCISES: CPT | Mod: CQ

## 2022-04-20 PROCEDURE — 25000242 PHARM REV CODE 250 ALT 637 W/ HCPCS: Performed by: NURSE PRACTITIONER

## 2022-04-20 PROCEDURE — 93010 EKG 12-LEAD: ICD-10-PCS | Mod: ,,, | Performed by: INTERNAL MEDICINE

## 2022-04-20 PROCEDURE — 36415 COLL VENOUS BLD VENIPUNCTURE: CPT | Performed by: HOSPITALIST

## 2022-04-20 PROCEDURE — 84484 ASSAY OF TROPONIN QUANT: CPT | Performed by: HOSPITALIST

## 2022-04-20 PROCEDURE — 63600175 PHARM REV CODE 636 W HCPCS: Performed by: HOSPITALIST

## 2022-04-20 PROCEDURE — 93005 ELECTROCARDIOGRAM TRACING: CPT

## 2022-04-20 PROCEDURE — 25000003 PHARM REV CODE 250: Performed by: NURSE PRACTITIONER

## 2022-04-20 PROCEDURE — 27000221 HC OXYGEN, UP TO 24 HOURS

## 2022-04-20 PROCEDURE — 83690 ASSAY OF LIPASE: CPT | Performed by: HOSPITALIST

## 2022-04-20 PROCEDURE — 83735 ASSAY OF MAGNESIUM: CPT | Performed by: HOSPITALIST

## 2022-04-20 PROCEDURE — 93010 ELECTROCARDIOGRAM REPORT: CPT | Mod: ,,, | Performed by: INTERNAL MEDICINE

## 2022-04-20 RX ORDER — HEPARIN SODIUM 5000 [USP'U]/ML
5000 INJECTION, SOLUTION INTRAVENOUS; SUBCUTANEOUS EVERY 8 HOURS
Status: DISCONTINUED | OUTPATIENT
Start: 2022-04-20 | End: 2022-04-25 | Stop reason: HOSPADM

## 2022-04-20 RX ORDER — MAGNESIUM SULFATE HEPTAHYDRATE 40 MG/ML
2 INJECTION, SOLUTION INTRAVENOUS ONCE
Status: COMPLETED | OUTPATIENT
Start: 2022-04-20 | End: 2022-04-20

## 2022-04-20 RX ORDER — TRAMADOL HYDROCHLORIDE 50 MG/1
50 TABLET ORAL EVERY 6 HOURS PRN
Status: DISCONTINUED | OUTPATIENT
Start: 2022-04-20 | End: 2022-04-25 | Stop reason: HOSPADM

## 2022-04-20 RX ORDER — GUAIFENESIN 100 MG/5ML
200 SOLUTION ORAL EVERY 4 HOURS PRN
Status: DISCONTINUED | OUTPATIENT
Start: 2022-04-20 | End: 2022-04-25 | Stop reason: HOSPADM

## 2022-04-20 RX ORDER — IPRATROPIUM BROMIDE AND ALBUTEROL SULFATE 2.5; .5 MG/3ML; MG/3ML
3 SOLUTION RESPIRATORY (INHALATION) EVERY 6 HOURS PRN
Status: DISCONTINUED | OUTPATIENT
Start: 2022-04-20 | End: 2022-04-25 | Stop reason: HOSPADM

## 2022-04-20 RX ADMIN — GABAPENTIN 100 MG: 100 CAPSULE ORAL at 08:04

## 2022-04-20 RX ADMIN — ACETAMINOPHEN 650 MG: 325 TABLET ORAL at 08:04

## 2022-04-20 RX ADMIN — MAGNESIUM SULFATE IN WATER 2 G: 40 INJECTION, SOLUTION INTRAVENOUS at 09:04

## 2022-04-20 RX ADMIN — SODIUM CHLORIDE, SODIUM LACTATE, POTASSIUM CHLORIDE, AND CALCIUM CHLORIDE 1000 ML: .6; .31; .03; .02 INJECTION, SOLUTION INTRAVENOUS at 05:04

## 2022-04-20 RX ADMIN — TRAMADOL HYDROCHLORIDE 50 MG: 50 TABLET, COATED ORAL at 04:04

## 2022-04-20 RX ADMIN — SENNOSIDES 2 TABLET: 8.6 TABLET, FILM COATED ORAL at 08:04

## 2022-04-20 RX ADMIN — ATORVASTATIN CALCIUM 80 MG: 40 TABLET, FILM COATED ORAL at 08:04

## 2022-04-20 RX ADMIN — FLUOXETINE HYDROCHLORIDE 60 MG: 20 CAPSULE ORAL at 08:04

## 2022-04-20 RX ADMIN — LIDOCAINE 1 PATCH: 50 PATCH CUTANEOUS at 03:04

## 2022-04-20 RX ADMIN — IPRATROPIUM BROMIDE AND ALBUTEROL SULFATE 3 ML: 2.5; .5 SOLUTION RESPIRATORY (INHALATION) at 10:04

## 2022-04-20 RX ADMIN — TRAMADOL HYDROCHLORIDE 50 MG: 50 TABLET, COATED ORAL at 11:04

## 2022-04-20 RX ADMIN — GUAIFENESIN 200 MG: 200 SOLUTION ORAL at 11:04

## 2022-04-20 RX ADMIN — LEVOTHYROXINE SODIUM 125 MCG: 0.03 TABLET ORAL at 05:04

## 2022-04-20 RX ADMIN — OXYBUTYNIN CHLORIDE 15 MG: 5 TABLET, EXTENDED RELEASE ORAL at 08:04

## 2022-04-20 RX ADMIN — QUETIAPINE FUMARATE 200 MG: 100 TABLET ORAL at 08:04

## 2022-04-20 RX ADMIN — OXYCODONE 5 MG: 5 TABLET ORAL at 03:04

## 2022-04-20 RX ADMIN — PROMETHAZINE HYDROCHLORIDE 12.5 MG: 25 INJECTION INTRAMUSCULAR; INTRAVENOUS at 02:04

## 2022-04-20 RX ADMIN — CEFAZOLIN SODIUM 2 G: 2 SOLUTION INTRAVENOUS at 05:04

## 2022-04-20 RX ADMIN — HEPARIN SODIUM 5000 UNITS: 5000 INJECTION INTRAVENOUS; SUBCUTANEOUS at 01:04

## 2022-04-20 RX ADMIN — HEPARIN SODIUM 5000 UNITS: 5000 INJECTION INTRAVENOUS; SUBCUTANEOUS at 11:04

## 2022-04-20 RX ADMIN — PANTOPRAZOLE SODIUM 40 MG: 40 TABLET, DELAYED RELEASE ORAL at 08:04

## 2022-04-20 RX ADMIN — SODIUM CHLORIDE, SODIUM LACTATE, POTASSIUM CHLORIDE, AND CALCIUM CHLORIDE 1000 ML: .6; .31; .03; .02 INJECTION, SOLUTION INTRAVENOUS at 09:04

## 2022-04-20 RX ADMIN — MUPIROCIN: 20 OINTMENT TOPICAL at 08:04

## 2022-04-20 RX ADMIN — TRAZODONE HYDROCHLORIDE 300 MG: 100 TABLET ORAL at 08:04

## 2022-04-20 NOTE — ASSESSMENT & PLAN NOTE
· Creatinine 2.8 on admit, baseline around 1.1 - was 5.9 a week ago - 2 today  · Has been on Torsemide, but also with decreased PO intake  · Renal US without hydro  · Stop Torsemide for now  · Continue with gentle IVF boluses as Creatinine is improving  · Check 2D echo

## 2022-04-20 NOTE — SUBJECTIVE & OBJECTIVE
Interval History: No acute events overnight.  BP improved and Creatinine better.  She wants to be discharged on IV antibiotics.  Discussed ID recs for PO Keflex on DC, and that it can be re-evaluated when she is closer to DC.    Review of Systems   Constitutional:  Negative for chills, fatigue and fever.   Respiratory:  Negative for cough and shortness of breath.    Cardiovascular:  Negative for chest pain, palpitations and leg swelling.   Gastrointestinal:  Positive for abdominal pain and nausea. Negative for diarrhea and vomiting.   Genitourinary:  Negative for dysuria and urgency.   Neurological:  Negative for dizziness and headaches.   All other systems reviewed and are negative.  Objective:     Vital Signs (Most Recent):  Temp: 96.5 °F (35.8 °C) (04/20/22 0806)  Pulse: 64 (04/20/22 0806)  Resp: 18 (04/20/22 0806)  BP: (!) 113/55 (04/20/22 0806)  SpO2: 95 % (04/20/22 0806)   Vital Signs (24h Range):  Temp:  [96.1 °F (35.6 °C)-97.7 °F (36.5 °C)] 96.5 °F (35.8 °C)  Pulse:  [46-64] 64  Resp:  [16-18] 18  SpO2:  [94 %-99 %] 95 %  BP: ()/(47-56) 113/55     Weight: 86.8 kg (191 lb 5.8 oz)  Body mass index is 32.85 kg/m².    Intake/Output Summary (Last 24 hours) at 4/20/2022 0934  Last data filed at 4/20/2022 0313  Gross per 24 hour   Intake --   Output 1400 ml   Net -1400 ml        Physical Exam  Constitutional:       Appearance: Normal appearance. She is well-developed. She is obese.   HENT:      Head: Normocephalic and atraumatic.   Cardiovascular:      Rate and Rhythm: Regular rhythm. Bradycardia present.      Heart sounds: No murmur heard.  Pulmonary:      Effort: Pulmonary effort is normal. No respiratory distress.      Breath sounds: Normal breath sounds. No wheezing or rales.   Abdominal:      General: There is no distension.      Palpations: Abdomen is soft.      Tenderness: There is abdominal tenderness (Mideipigastrium).   Musculoskeletal:         General: No deformity.   Skin:     General: Skin is  warm.   Neurological:      General: No focal deficit present.      Mental Status: She is alert and oriented to person, place, and time. Mental status is at baseline.       Significant Labs: All pertinent labs within the past 24 hours have been reviewed.  BMP:   Recent Labs   Lab 04/20/22  0458   GLU 86      K 4.0   CL 95   CO2 34*   BUN 20   CREATININE 2.0*   CALCIUM 9.4   MG 1.8       Urine Culture: No results for input(s): LABURIN in the last 48 hours.    Significant Imaging: I have reviewed all pertinent imaging results/findings within the past 24 hours.

## 2022-04-20 NOTE — PROGRESS NOTES
1050  Patient complained of chest pain rating 4/10, Rebecca Condon notified. Obtained order for EKG, Telemetry, and Troponin. Patient SBP continues to be in 80's-90's, no order for nitro at this time. Patient oxygen saturation also in the high 80's to low 90's patient placed on 2 L nasal canula. Patient in no other distress. Patient will be continued to be monitored.      1100   chest pain has resolved on it own. Patient in no other distress at this moment. Writer will continue to monitor patients status.

## 2022-04-20 NOTE — PROGRESS NOTES
Patient in pain of 6/10 at alyson lateral flank. Patient SBP is still less than 100 not oxycodone give. Rebecca Condon MD notified of patients pain. Writer obtained an order for Tramadol, and to hold oxycodone if patients systolic blood pressure is below 100. Writer will continue to monitor patients status.

## 2022-04-20 NOTE — PT/OT/SLP PROGRESS
Physical Therapy Treatment    Patient Name:  Tasha Hawley   MRN:  [id number]    Recommendations:     Discharge Recommendations:  home health PT   Discharge Equipment Recommendations: none   Barriers to discharge: decreased mobility,strength and endurance    Assessment:     Tasha Hawley is a 66 y.o. female admitted with a medical diagnosis of CARITO (acute kidney injury).  She presents with the following impairments/functional limitations:  weakness, impaired endurance, impaired functional mobilty, gait instability, impaired balance, decreased upper extremity function, decreased lower extremity function, pain, decreased ROM, impaired coordination,pt with good participation and limited by increased fatigue and pain with some cardiac testing done today,pt will benefit from continuing PT services upon discharge.    Rehab Prognosis: Fair; patient would benefit from acute skilled PT services to address these deficits and reach maximum level of function.    Recent Surgery: * No surgery found *      Plan:     During this hospitalization, patient to be seen 5 x/week to address the identified rehab impairments via gait training, therapeutic activities, therapeutic exercises, neuromuscular re-education and progress toward the following goals:    · Plan of Care Expires:  05/15/22    Subjective     Chief Complaint: chronic back pain.  Patient/Family Comments/goals: pt has a lot going on.  Pain/Comfort:  · Pain Rating 1: 9/10  · Location - Orientation 1: lower  · Location 1: back  · Pain Addressed 1: Nurse notified  · Pain Rating Post-Intervention 1: 9/10      Objective:     Communicated with nsg prior to session.  Patient found supine with bed alarm, meadows catheter, peripheral IV, telemetry upon PT entry to room.     General Precautions: Standard, fall, hearing impaired   Orthopedic Precautions:N/A   Braces: N/A  Respiratory Status: Room air     Functional Mobility:  · Gait: n/a      AM-PAC 6 CLICK MOBILITY  Turning over in  bed (including adjusting bedclothes, sheets and blankets)?: 3  Sitting down on and standing up from a chair with arms (e.g., wheelchair, bedside commode, etc.): 3  Moving from lying on back to sitting on the side of the bed?: 3  Moving to and from a bed to a chair (including a wheelchair)?: 3  Need to walk in hospital room?: 2  Climbing 3-5 steps with a railing?: 1  Basic Mobility Total Score: 15       Therapeutic Activities and Exercises: le AA supine ex's X 5-7 reps inc: ap,hs,abd/add with rest periods,nsg present to issue nausea medicine.       Patient left supine with all lines intact, call button in reach, bed alarm on and nsg present..    GOALS: see general POC  Multidisciplinary Problems     Physical Therapy Goals        Problem: Physical Therapy    Goal Priority Disciplines Outcome Goal Variances Interventions   Physical Therapy Goal     PT, PT/OT Ongoing, Progressing     Description: Goals to be met by: discharge date     Patient will increase functional independence with mobility by performin. Supine to sit with Modified [place]  2. Sit to supine with Modified [place]  3. Sit to stand transfer with Modified [place]  4. Bed to chair transfer with Modified [place] and Supervision using Rolling Walker and janeen OOB chair > 1 hr  5. Gait  x 100 feet with Stand-by Assistance using Rolling Walker.   6. Lower extremity exercise program x15 reps                    Time Tracking:     PT Received On: 22  PT Start Time: 1404     PT Stop Time: 1417  PT Total Time (min): 13 min     Billable Minutes: Therapeutic Exercise 13    Treatment Type: Treatment  PT/PTA: PTA     PTA Visit Number: 2     2022

## 2022-04-20 NOTE — PROGRESS NOTES
[facility] Medicine  Progress Note    Patient Name: Tasha Hawley  MRN: [id number]  Patient Class: IP- Inpatient   Admission Date: 4/14/2022  Length of Stay: 2 days  Attending Physician: Rebecca Condon MD  Primary Care Provider: Mesfin Hodges Ii, MD        Subjective:     Principal Problem:CARITO (acute kidney injury)        HPI:  Tasha Hawley is a 65 yo female with a past medical history of Depression, Anemia, Congestive heart failure, Renal disorder, Lumbar radiculopathy, Sleep apnea, Neurogenic bladder in which she has a suprapubic catheter, recurrent UTI, Chronic pain with epidural pain pump, Coronary artery disease, Hypothyroidism, and Sleep apnea. Her PCP is Dr. Mesfin Hodges. She is established with Dr. Karla Amezquita for Nephrology. Dr. Mayo is her Urologist. She has home health with [facility] . She presented to the ED at [facility] via EMS with a cc of generalized body aches for the past 3-4 days. Associated symptoms include poor oral intake, red and orange colored urine, back pain and nausea. She also reports constipation. She denies fever, diarrhea, headaches, or blurred vision.     ED workup revealed WBC UA 78, UA Nitrate +, UA Leukocytes 3+, Hgb 9.6, Hct 29.8, K 3.0, CO2 33, Cr 2.8, Albumin 3.4, ALT 9, Drug screen + Opiate, EKG SB, rate 47, CXR Chronic appearing interstitial findings, no large focal consolidation. IV Rocephin initiated. Urology consulted. Admitted to Hospital medicine for further evaluation and treatment.       Overview/Hospital Course:  Ms. Hawley was admitted to Hospital Medicine for management of a UTI.  She was started on Ceftriaxone.  Urology was consulted.  She was found to have MSSA and was changed to IV Ancef.  ID suggested 2 weeks for a complicated UTI, end date 5/2 - with plans to switch to PO Keflex on DC.  She continued to suffer from CARITO, hypotension, and bradycardia.  2D echo was ordered.      Interval History: No acute events overnight.  BP  improved and Creatinine better.  She wants to be discharged on IV antibiotics.  Discussed ID recs for PO Keflex on DC, and that it can be re-evaluated when she is closer to DC.    Review of Systems   Constitutional:  Negative for chills, fatigue and fever.   Respiratory:  Negative for cough and shortness of breath.    Cardiovascular:  Negative for chest pain, palpitations and leg swelling.   Gastrointestinal:  Positive for abdominal pain and nausea. Negative for diarrhea and vomiting.   Genitourinary:  Negative for dysuria and urgency.   Neurological:  Negative for dizziness and headaches.   All other systems reviewed and are negative.  Objective:     Vital Signs (Most Recent):  Temp: 96.5 °F (35.8 °C) (04/20/22 0806)  Pulse: 64 (04/20/22 0806)  Resp: 18 (04/20/22 0806)  BP: (!) 113/55 (04/20/22 0806)  SpO2: 95 % (04/20/22 0806)   Vital Signs (24h Range):  Temp:  [96.1 °F (35.6 °C)-97.7 °F (36.5 °C)] 96.5 °F (35.8 °C)  Pulse:  [46-64] 64  Resp:  [16-18] 18  SpO2:  [94 %-99 %] 95 %  BP: ()/(47-56) 113/55     Weight: 86.8 kg (191 lb 5.8 oz)  Body mass index is 32.85 kg/m².    Intake/Output Summary (Last 24 hours) at 4/20/2022 0917  Last data filed at 4/20/2022 0313  Gross per 24 hour   Intake --   Output 1400 ml   Net -1400 ml        Physical Exam  Constitutional:       Appearance: Normal appearance. She is well-developed. She is obese.   HENT:      Head: Normocephalic and atraumatic.   Cardiovascular:      Rate and Rhythm: Regular rhythm. Bradycardia present.      Heart sounds: No murmur heard.  Pulmonary:      Effort: Pulmonary effort is normal. No respiratory distress.      Breath sounds: Normal breath sounds. No wheezing or rales.   Abdominal:      General: There is no distension.      Palpations: Abdomen is soft.      Tenderness: There is abdominal tenderness (Mideipigastrium).   Musculoskeletal:         General: No deformity.   Skin:     General: Skin is warm.   Neurological:      General: No focal deficit  present.      Mental Status: She is alert and oriented to person, place, and time. Mental status is at baseline.       Significant Labs: All pertinent labs within the past 24 hours have been reviewed.  BMP:   Recent Labs   Lab 04/20/22  0458   GLU 86      K 4.0   CL 95   CO2 34*   BUN 20   CREATININE 2.0*   CALCIUM 9.4   MG 1.8       Urine Culture: No results for input(s): LABURIN in the last 48 hours.    Significant Imaging: I have reviewed all pertinent imaging results/findings within the past 24 hours.      Assessment/Plan:      * CARITO (acute kidney injury)  · Creatinine 2.8 on admit, baseline around 1.1 - was 5.9 a week ago - 2 today  · Has been on Torsemide, but also with decreased PO intake  · Renal US without hydro  · Stop Torsemide for now  · Continue with gentle IVF boluses as Creatinine is improving  · Check 2D echo    Staph aureus infection  · MSSA UTI associated with suprapubic catheter, meadows changed in the ER  · ID consulted:  Suggest 2 week course end date 5/2 - IV Ancef then PO Keflex on DC, but patient reports she wants IV abx on DC - will readdress when closer to DC    Abdominal pain  · Likely 2/2 UTI  · Check Lipase    Urinary tract infection associated with cystostomy catheter  · Chronic and recurrent UTIs  · Followed by ID and urology outpatient  · Meadows changed in the ER  · Management as above    Neurogenic bladder  · Chronic catheter  · Continue Ditropan 15mg PO daily    Class 1 obesity due to excess calories in adult  Body mass index is 32.85 kg/m².  · Encourage diet, weight loss, exercise      Anxiety  · Chronic and stable  · Continue Trazodone and Seroquel    Bradycardia  · Chronic issue  · HR 45s-55s, asymptomatic  · Continuous telemetry monitoring   · Keep Mag >2, K >4      Paresthesia of both lower extremities  · Trial low dose Gabapentin      Primary hypothyroidism  · Chronic and stable  · Continue Levothyroxine 125mcg po before breakfast      Frequent falls  · PT/OT eval and  treat: HH at discharge  · Fall precautions  · Reinforced to patient to call for assistance      CHF (congestive heart failure)  - Denies shortness of breath  - Chronic BLE, bedrest  - CHF pathways  - monitor pulse ox    7/7/2021 TTE    · The left ventricle is normal in size with normal systolic function.  · The estimated ejection fraction is 55%.  · Normal left ventricular diastolic function.  · Mild tricuspid regurgitation.  · Normal right ventricular size with normal right ventricular systolic function.  · The estimated PA systolic pressure is 47 mmHg.  · There is pulmonary hypertension.        Major depressive disorder, recurrent, mild  · Chronic and stable  · Continue SSRI    Sleep apnea  · CPAP every night        VTE Risk Mitigation (From admission, onward)    None          Discharge Planning   JACKIE: 4/20/2022     Code Status: Full Code   Is the patient medically ready for discharge?:     Reason for patient still in hospital (select all that apply): Patient trending condition  Discharge Plan A: Home Health, Other (IV abx)                  Rebecca Condon MD  Department of Hospital Medicine   [place] [facility]

## 2022-04-20 NOTE — PROGRESS NOTES
RSW contacted pt via room phone to discuss resources, discharge, and additional social barriers to care. RSW notified pt COA resource is on pt AVS, pt expressed understanding. Pt did not identify any additional social barriers to care at this time. Per pt, pt is not in need of additional resources at this time.    The following were addressed during this visit:  - Complete follow-up visit with patient    ML Weir

## 2022-04-20 NOTE — PLAN OF CARE
Problem: Adult Inpatient Plan of Care  Goal: Plan of Care Review  Outcome: Ongoing, Progressing  Goal: Patient-Specific Goal (Individualized)  Outcome: Ongoing, Progressing  Goal: Absence of Hospital-Acquired Illness or Injury  Outcome: Ongoing, Progressing  Goal: Optimal Comfort and Wellbeing  Outcome: Ongoing, Progressing  Goal: Readiness for Transition of Care  Outcome: Ongoing, Progressing     Problem: Fall Injury Risk  Goal: Absence of Fall and Fall-Related Injury  Outcome: Ongoing, Progressing     Problem: Pain Chronic (Persistent)  Goal: Acceptable Pain Control and Functional Ability  Outcome: Ongoing, Progressing     Problem: UTI (Urinary Tract Infection)  Goal: Improved Infection Symptoms  Outcome: Ongoing, Progressing     Problem: Skin Injury Risk Increased  Goal: Skin Health and Integrity  Outcome: Ongoing, Progressing

## 2022-04-20 NOTE — PT/OT/SLP PROGRESS
Occupational Therapy  Visit Attempts (x3)    Patient Name:  Tasha Hawley   MRN:  [id number]    Patient not seen today secondary to Other (Comment) (9:03 - Patient /c nsg (IV placement?); 11:00 - Patient reporting increased chest pain and scheduled for cardio testing.). 14:20 - Currently doing bed level activity /c PT.    4/20/2022

## 2022-04-20 NOTE — PLAN OF CARE
Problem: Physical Therapy  Goal: Physical Therapy Goal  Description: Goals to be met by: discharge date     Patient will increase functional independence with mobility by performin. Supine to sit with Modified [place]  2. Sit to supine with Modified [place]  3. Sit to stand transfer with Modified [place]  4. Bed to chair transfer with Modified [place] and Supervision using Rolling Walker and janeen OOB chair > 1 hr  5. Gait  x 100 feet with Stand-by Assistance using Rolling Walker.   6. Lower extremity exercise program x15 reps   Outcome: Ongoing, Progressing

## 2022-04-20 NOTE — ASSESSMENT & PLAN NOTE
· MSSA UTI associated with suprapubic catheter, meadows changed in the ER  · ID consulted:  Suggest 2 week course end date 5/2 - IV Ancef then PO Keflex on DC, but patient reports she wants IV abx on DC - will readdress when closer to DC

## 2022-04-21 LAB
ALBUMIN SERPL BCP-MCNC: 3.4 G/DL (ref 3.5–5.2)
ALP SERPL-CCNC: 77 U/L (ref 55–135)
ALT SERPL W/O P-5'-P-CCNC: <5 U/L (ref 10–44)
ANION GAP SERPL CALC-SCNC: 11 MMOL/L (ref 8–16)
AST SERPL-CCNC: 19 U/L (ref 10–40)
BASOPHILS # BLD AUTO: 0.02 K/UL (ref 0–0.2)
BASOPHILS NFR BLD: 0.4 % (ref 0–1.9)
BILIRUB SERPL-MCNC: 0.2 MG/DL (ref 0.1–1)
BUN SERPL-MCNC: 14 MG/DL (ref 8–23)
CALCIUM SERPL-MCNC: 9.6 MG/DL (ref 8.7–10.5)
CHLORIDE SERPL-SCNC: 99 MMOL/L (ref 95–110)
CO2 SERPL-SCNC: 32 MMOL/L (ref 23–29)
CREAT SERPL-MCNC: 1.7 MG/DL (ref 0.5–1.4)
DIFFERENTIAL METHOD: ABNORMAL
EOSINOPHIL # BLD AUTO: 0.2 K/UL (ref 0–0.5)
EOSINOPHIL NFR BLD: 5.2 % (ref 0–8)
ERYTHROCYTE [DISTWIDTH] IN BLOOD BY AUTOMATED COUNT: 15 % (ref 11.5–14.5)
EST. GFR  (AFRICAN AMERICAN): 36 ML/MIN/1.73 M^2
EST. GFR  (NON AFRICAN AMERICAN): 31 ML/MIN/1.73 M^2
GLUCOSE SERPL-MCNC: 119 MG/DL (ref 70–110)
HCT VFR BLD AUTO: 28.9 % (ref 37–48.5)
HGB BLD-MCNC: 8.8 G/DL (ref 12–16)
IMM GRANULOCYTES # BLD AUTO: 0.01 K/UL (ref 0–0.04)
IMM GRANULOCYTES NFR BLD AUTO: 0.2 % (ref 0–0.5)
LYMPHOCYTES # BLD AUTO: 1.6 K/UL (ref 1–4.8)
LYMPHOCYTES NFR BLD: 34.8 % (ref 18–48)
MAGNESIUM SERPL-MCNC: 2.1 MG/DL (ref 1.6–2.6)
MCH RBC QN AUTO: 29.2 PG (ref 27–31)
MCHC RBC AUTO-ENTMCNC: 30.4 G/DL (ref 32–36)
MCV RBC AUTO: 96 FL (ref 82–98)
MONOCYTES # BLD AUTO: 0.5 K/UL (ref 0.3–1)
MONOCYTES NFR BLD: 9.7 % (ref 4–15)
NEUTROPHILS # BLD AUTO: 2.3 K/UL (ref 1.8–7.7)
NEUTROPHILS NFR BLD: 49.7 % (ref 38–73)
NRBC BLD-RTO: 0 /100 WBC
PHOSPHATE SERPL-MCNC: 2.5 MG/DL (ref 2.7–4.5)
PLATELET # BLD AUTO: 151 K/UL (ref 150–450)
PMV BLD AUTO: 10.5 FL (ref 9.2–12.9)
POCT GLUCOSE: 143 MG/DL (ref 70–110)
POCT GLUCOSE: 184 MG/DL (ref 70–110)
POTASSIUM SERPL-SCNC: 4.2 MMOL/L (ref 3.5–5.1)
PROT SERPL-MCNC: 6.4 G/DL (ref 6–8.4)
RBC # BLD AUTO: 3.01 M/UL (ref 4–5.4)
SODIUM SERPL-SCNC: 142 MMOL/L (ref 136–145)
WBC # BLD AUTO: 4.62 K/UL (ref 3.9–12.7)

## 2022-04-21 PROCEDURE — 85025 COMPLETE CBC W/AUTO DIFF WBC: CPT | Performed by: HOSPITALIST

## 2022-04-21 PROCEDURE — 80053 COMPREHEN METABOLIC PANEL: CPT | Performed by: HOSPITALIST

## 2022-04-21 PROCEDURE — 25000003 PHARM REV CODE 250: Performed by: HOSPITALIST

## 2022-04-21 PROCEDURE — 63600175 PHARM REV CODE 636 W HCPCS: Performed by: HOSPITALIST

## 2022-04-21 PROCEDURE — 11000001 HC ACUTE MED/SURG PRIVATE ROOM

## 2022-04-21 PROCEDURE — 25000003 PHARM REV CODE 250: Performed by: NURSE PRACTITIONER

## 2022-04-21 PROCEDURE — 36415 COLL VENOUS BLD VENIPUNCTURE: CPT | Performed by: HOSPITALIST

## 2022-04-21 PROCEDURE — 83735 ASSAY OF MAGNESIUM: CPT | Performed by: HOSPITALIST

## 2022-04-21 PROCEDURE — 94799 UNLISTED PULMONARY SVC/PX: CPT

## 2022-04-21 PROCEDURE — 97110 THERAPEUTIC EXERCISES: CPT | Mod: CQ

## 2022-04-21 PROCEDURE — 27000221 HC OXYGEN, UP TO 24 HOURS

## 2022-04-21 PROCEDURE — 84100 ASSAY OF PHOSPHORUS: CPT | Performed by: HOSPITALIST

## 2022-04-21 PROCEDURE — 94761 N-INVAS EAR/PLS OXIMETRY MLT: CPT

## 2022-04-21 PROCEDURE — 99900035 HC TECH TIME PER 15 MIN (STAT)

## 2022-04-21 PROCEDURE — 97116 GAIT TRAINING THERAPY: CPT | Mod: CQ

## 2022-04-21 RX ORDER — SODIUM CHLORIDE, SODIUM LACTATE, POTASSIUM CHLORIDE, CALCIUM CHLORIDE 600; 310; 30; 20 MG/100ML; MG/100ML; MG/100ML; MG/100ML
INJECTION, SOLUTION INTRAVENOUS CONTINUOUS
Status: ACTIVE | OUTPATIENT
Start: 2022-04-21 | End: 2022-04-21

## 2022-04-21 RX ORDER — HYDROXYZINE HYDROCHLORIDE 25 MG/1
25 TABLET, FILM COATED ORAL EVERY 8 HOURS PRN
Status: DISCONTINUED | OUTPATIENT
Start: 2022-04-21 | End: 2022-04-25 | Stop reason: HOSPADM

## 2022-04-21 RX ADMIN — PANTOPRAZOLE SODIUM 40 MG: 40 TABLET, DELAYED RELEASE ORAL at 08:04

## 2022-04-21 RX ADMIN — OXYBUTYNIN CHLORIDE 15 MG: 5 TABLET, EXTENDED RELEASE ORAL at 08:04

## 2022-04-21 RX ADMIN — ATORVASTATIN CALCIUM 80 MG: 40 TABLET, FILM COATED ORAL at 08:04

## 2022-04-21 RX ADMIN — HEPARIN SODIUM 5000 UNITS: 5000 INJECTION INTRAVENOUS; SUBCUTANEOUS at 03:04

## 2022-04-21 RX ADMIN — HEPARIN SODIUM 5000 UNITS: 5000 INJECTION INTRAVENOUS; SUBCUTANEOUS at 09:04

## 2022-04-21 RX ADMIN — CEFAZOLIN SODIUM 2 G: 2 SOLUTION INTRAVENOUS at 06:04

## 2022-04-21 RX ADMIN — FLUOXETINE HYDROCHLORIDE 60 MG: 20 CAPSULE ORAL at 08:04

## 2022-04-21 RX ADMIN — SENNOSIDES 2 TABLET: 8.6 TABLET, FILM COATED ORAL at 08:04

## 2022-04-21 RX ADMIN — QUETIAPINE FUMARATE 200 MG: 100 TABLET ORAL at 09:04

## 2022-04-21 RX ADMIN — HYDROXYZINE HYDROCHLORIDE 25 MG: 25 TABLET, FILM COATED ORAL at 05:04

## 2022-04-21 RX ADMIN — SENNOSIDES 2 TABLET: 8.6 TABLET, FILM COATED ORAL at 09:04

## 2022-04-21 RX ADMIN — LEVOTHYROXINE SODIUM 125 MCG: 0.03 TABLET ORAL at 05:04

## 2022-04-21 RX ADMIN — HEPARIN SODIUM 5000 UNITS: 5000 INJECTION INTRAVENOUS; SUBCUTANEOUS at 05:04

## 2022-04-21 RX ADMIN — TRAMADOL HYDROCHLORIDE 50 MG: 50 TABLET, COATED ORAL at 03:04

## 2022-04-21 RX ADMIN — POLYETHYLENE GLYCOL 3350 17 G: 17 POWDER, FOR SOLUTION ORAL at 08:04

## 2022-04-21 RX ADMIN — GABAPENTIN 100 MG: 100 CAPSULE ORAL at 09:04

## 2022-04-21 RX ADMIN — TRAMADOL HYDROCHLORIDE 50 MG: 50 TABLET, COATED ORAL at 11:04

## 2022-04-21 RX ADMIN — SODIUM CHLORIDE 500 ML: 0.9 INJECTION, SOLUTION INTRAVENOUS at 06:04

## 2022-04-21 RX ADMIN — SODIUM CHLORIDE, SODIUM LACTATE, POTASSIUM CHLORIDE, AND CALCIUM CHLORIDE: .6; .31; .03; .02 INJECTION, SOLUTION INTRAVENOUS at 11:04

## 2022-04-21 RX ADMIN — TRAZODONE HYDROCHLORIDE 300 MG: 100 TABLET ORAL at 09:04

## 2022-04-21 RX ADMIN — CEFAZOLIN SODIUM 2 G: 2 SOLUTION INTRAVENOUS at 05:04

## 2022-04-21 RX ADMIN — MUPIROCIN: 20 OINTMENT TOPICAL at 08:04

## 2022-04-21 NOTE — SUBJECTIVE & OBJECTIVE
Interval History: No acute events overnight.  BP improved and Creatinine better with IVF, although did drop to the 70s.  HR still low, likely from pain pump.  Discussed holding Torsemide with daughter over the phone.    Review of Systems   Constitutional:  Negative for chills, fatigue and fever.   Respiratory:  Negative for cough and shortness of breath.    Cardiovascular:  Negative for chest pain, palpitations and leg swelling.   Gastrointestinal:  Positive for abdominal pain and nausea. Negative for diarrhea and vomiting.   Genitourinary:  Negative for dysuria and urgency.   Neurological:  Negative for dizziness and headaches.   All other systems reviewed and are negative.  Objective:     Vital Signs (Most Recent):  Temp: 96.5 °F (35.8 °C) (04/21/22 0808)  Pulse: (!) 53 (04/21/22 0808)  Resp: 18 (04/21/22 0808)  BP: (!) 101/55 (04/21/22 0808)  SpO2: (!) 89 % (04/21/22 0808)   Vital Signs (24h Range):  Temp:  [96 °F (35.6 °C)-97.7 °F (36.5 °C)] 96.5 °F (35.8 °C)  Pulse:  [41-60] 53  Resp:  [16-18] 18  SpO2:  [89 %-100 %] 89 %  BP: ()/(39-62) 101/55     Weight: 86.6 kg (191 lb)  Body mass index is 32.79 kg/m².    Intake/Output Summary (Last 24 hours) at 4/21/2022 1042  Last data filed at 4/21/2022 0750  Gross per 24 hour   Intake 1315.44 ml   Output 2850 ml   Net -1534.56 ml        Physical Exam  Constitutional:       Appearance: Normal appearance. She is well-developed. She is obese.   HENT:      Head: Normocephalic and atraumatic.   Cardiovascular:      Rate and Rhythm: Regular rhythm. Bradycardia present.      Heart sounds: No murmur heard.  Pulmonary:      Effort: Pulmonary effort is normal. No respiratory distress.      Breath sounds: Normal breath sounds. No wheezing or rales.   Abdominal:      General: There is no distension.      Palpations: Abdomen is soft.      Tenderness: There is abdominal tenderness (Mideipigastrium). There is right CVA tenderness and left CVA tenderness.      Comments: Suprapubic  catheter   Musculoskeletal:         General: No deformity.      Right lower leg: Edema present.      Left lower leg: Edema present.   Skin:     General: Skin is warm.   Neurological:      General: No focal deficit present.      Mental Status: She is alert and oriented to person, place, and time. Mental status is at baseline.      Comments: Chronic right sided facial droop       Significant Labs: All pertinent labs within the past 24 hours have been reviewed.  BMP:   Recent Labs   Lab 04/21/22  0903   *      K 4.2   CL 99   CO2 32*   BUN 14   CREATININE 1.7*   CALCIUM 9.6   MG 2.1       Urine Culture: No results for input(s): LABURIN in the last 48 hours.    Significant Imaging: I have reviewed all pertinent imaging results/findings within the past 24 hours.

## 2022-04-21 NOTE — PROGRESS NOTES
[facility] Medicine  Progress Note    Patient Name: Tasha Hawley  MRN: [id number]  Patient Class: IP- Inpatient   Admission Date: 4/14/2022  Length of Stay: 3 days  Attending Physician: Rebecca Condon MD  Primary Care Provider: Mesfin Hodges Ii, MD        Subjective:     Principal Problem:CARITO (acute kidney injury)        HPI:  Tasha Hawley is a 65 yo female with a past medical history of Depression, Anemia, Congestive heart failure, Renal disorder, Lumbar radiculopathy, Sleep apnea, Neurogenic bladder in which she has a suprapubic catheter, recurrent UTI, Chronic pain with epidural pain pump, Coronary artery disease, Hypothyroidism, and Sleep apnea. Her PCP is Dr. Mesfin Hodges. She is established with Dr. Karla Amezquita for Nephrology. Dr. Mayo is her Urologist. She has home health with [facility] . She presented to the ED at [facility] via EMS with a cc of generalized body aches for the past 3-4 days. Associated symptoms include poor oral intake, red and orange colored urine, back pain and nausea. She also reports constipation. She denies fever, diarrhea, headaches, or blurred vision.     ED workup revealed WBC UA 78, UA Nitrate +, UA Leukocytes 3+, Hgb 9.6, Hct 29.8, K 3.0, CO2 33, Cr 2.8, Albumin 3.4, ALT 9, Drug screen + Opiate, EKG SB, rate 47, CXR Chronic appearing interstitial findings, no large focal consolidation. IV Rocephin initiated. Urology consulted. Admitted to Hospital medicine for further evaluation and treatment.       Overview/Hospital Course:  Ms. Hawley was admitted to Hospital Medicine for management of a UTI.  She was started on Ceftriaxone.  Urology was consulted.  She was found to have MSSA and was changed to IV Ancef.  ID suggested 2 weeks for a complicated UTI, end date 5/2 - with plans to switch to PO Keflex on DC.  She continued to suffer from CARITO, hypotension, and bradycardia.  2D echo was ordered which was normal.  Her Torsemide was held.  She was given  small IVF boluses with improvement in her BP and Creatinine. HR continued to remain low, which was thought to be 2/2 her Dilaudid pain pump.      Interval History: No acute events overnight.  BP improved and Creatinine better with IVF, although did drop to the 70s.  HR still low, likely from pain pump.  Discussed holding Torsemide with daughter over the phone.    Review of Systems   Constitutional:  Negative for chills, fatigue and fever.   Respiratory:  Negative for cough and shortness of breath.    Cardiovascular:  Negative for chest pain, palpitations and leg swelling.   Gastrointestinal:  Positive for abdominal pain and nausea. Negative for diarrhea and vomiting.   Genitourinary:  Negative for dysuria and urgency.   Neurological:  Negative for dizziness and headaches.   All other systems reviewed and are negative.  Objective:     Vital Signs (Most Recent):  Temp: 96.5 °F (35.8 °C) (04/21/22 0808)  Pulse: (!) 53 (04/21/22 0808)  Resp: 18 (04/21/22 0808)  BP: (!) 101/55 (04/21/22 0808)  SpO2: (!) 89 % (04/21/22 0808)   Vital Signs (24h Range):  Temp:  [96 °F (35.6 °C)-97.7 °F (36.5 °C)] 96.5 °F (35.8 °C)  Pulse:  [41-60] 53  Resp:  [16-18] 18  SpO2:  [89 %-100 %] 89 %  BP: ()/(39-62) 101/55     Weight: 86.6 kg (191 lb)  Body mass index is 32.79 kg/m².    Intake/Output Summary (Last 24 hours) at 4/21/2022 1042  Last data filed at 4/21/2022 0750  Gross per 24 hour   Intake 1315.44 ml   Output 2850 ml   Net -1534.56 ml        Physical Exam  Constitutional:       Appearance: Normal appearance. She is well-developed. She is obese.   HENT:      Head: Normocephalic and atraumatic.   Cardiovascular:      Rate and Rhythm: Regular rhythm. Bradycardia present.      Heart sounds: No murmur heard.  Pulmonary:      Effort: Pulmonary effort is normal. No respiratory distress.      Breath sounds: Normal breath sounds. No wheezing or rales.   Abdominal:      General: There is no distension.      Palpations: Abdomen is soft.       Tenderness: There is abdominal tenderness (Mideipigastrium). There is right CVA tenderness and left CVA tenderness.      Comments: Suprapubic catheter   Musculoskeletal:         General: No deformity.      Right lower leg: Edema present.      Left lower leg: Edema present.   Skin:     General: Skin is warm.   Neurological:      General: No focal deficit present.      Mental Status: She is alert and oriented to person, place, and time. Mental status is at baseline.      Comments: Chronic right sided facial droop       Significant Labs: All pertinent labs within the past 24 hours have been reviewed.  BMP:   Recent Labs   Lab 04/21/22  0903   *      K 4.2   CL 99   CO2 32*   BUN 14   CREATININE 1.7*   CALCIUM 9.6   MG 2.1       Urine Culture: No results for input(s): LABURIN in the last 48 hours.    Significant Imaging: I have reviewed all pertinent imaging results/findings within the past 24 hours.      Assessment/Plan:      * CARITO (acute kidney injury)  · Creatinine 2.8 on admit, baseline around 1.1 - was 5.9 a week ago - 1.7 today  · Has been on Torsemide, but also with decreased PO intake  · Renal US without hydro  · Stop Torsemide for now  · Continue with gentle IVF boluses as Creatinine is improving    Staph aureus infection  · MSSA UTI associated with suprapubic catheter, meadows changed in the ER  · ID consulted:  Suggest 2 week course end date 5/2 - IV Ancef then PO Keflex on DC, but patient reports she wants IV abx on DC - will readdress when closer to DC    Abdominal pain  · Likely 2/2 UTI  · Lipase normal    Urinary tract infection associated with cystostomy catheter  · Chronic and recurrent UTIs  · Followed by ID and urology outpatient  · Meadows changed in the ER  · Management as above    Neurogenic bladder  · Chronic catheter  · Continue Ditropan 15mg PO daily    Class 1 obesity due to excess calories in adult  Body mass index is 32.79 kg/m².  · Encourage diet, weight loss,  exercise      Anxiety  · Chronic and stable  · Continue Trazodone and Seroquel    Bradycardia  · Chronic issue likely from Dilaudid pain pump  · HR 45s-55s, asymptomatic  · Continuous telemetry monitoring   · Keep Mag >2, K >4      Paresthesia of both lower extremities  · Trial low dose Gabapentin      Lymphedema of both lower extremities  · D dimer positive  · Check LE US to r/o DVT      Primary hypothyroidism  · Chronic and stable  · Continue Levothyroxine 125mcg po before breakfast      Frequent falls  · PT/OT eval and treat: HH at discharge  · Fall precautions  · Reinforced to patient to call for assistance      CHF (congestive heart failure)  - Denies shortness of breath  - Chronic BLE, bedrest  - CHF pathways  - monitor pulse ox    7/7/2021 TTE    · The left ventricle is normal in size with normal systolic function.  · The estimated ejection fraction is 55%.  · Normal left ventricular diastolic function.  · Mild tricuspid regurgitation.  · Normal right ventricular size with normal right ventricular systolic function.  · The estimated PA systolic pressure is 47 mmHg.  · There is pulmonary hypertension.        Chronic pain syndrome  · Chronic issue  · Has Dilaudid pain pump      Major depressive disorder, recurrent, mild  · Chronic and stable  · Continue SSRI    Sleep apnea  · CPAP every night        VTE Risk Mitigation (From admission, onward)         Ordered     heparin (porcine) injection 5,000 Units  Every 8 hours         04/20/22 1052                Discharge Planning   JACKIE: 4/22/2022     Code Status: Full Code   Is the patient medically ready for discharge?:     Reason for patient still in hospital (select all that apply): Patient trending condition  Discharge Plan A: Home Health, Other (IV abx)                  Rebecca Condon MD  Department of Hospital Medicine   [facility]

## 2022-04-21 NOTE — PT/OT/SLP PROGRESS
Physical Therapy Treatment    Patient Name:  Tasha Hawley   MRN:  [id number]    Recommendations:     Discharge Recommendations:  home health PT   Discharge Equipment Recommendations: none   Barriers to discharge: decreased mobility,strength and endurance    Assessment:     Tasha Hawley is a 66 y.o. female admitted with a medical diagnosis of CARITO (acute kidney injury).  She presents with the following impairments/functional limitations:  weakness, impaired endurance, impaired functional mobilty, impaired balance, gait instability, decreased lower extremity function, pain, decreased ROM, impaired coordination, impaired cardiopulmonary response to activity,pt with improved ability to participate in rx,and requires assistance with mobility at this time,pt will benefit from  services upon discharge.    Rehab Prognosis: Fair; patient would benefit from acute skilled PT services to address these deficits and reach maximum level of function.    Recent Surgery: * No surgery found *      Plan:     During this hospitalization, patient to be seen 5 x/week to address the identified rehab impairments via gait training, therapeutic activities, therapeutic exercises, neuromuscular re-education and progress toward the following goals:    · Plan of Care Expires:  05/15/22    Subjective     Chief Complaint: n/a  Patient/Family Comments/goals: pt agreeable to sit in chair.  Pain/Comfort:  · Pain Rating 1:  (no rating)  · Location - Orientation 1: lower  · Location 1: back (chronic)  · Pain Addressed 1: Reposition, Distraction      Objective:     Communicated with nsg prior to session.  Patient found supine with meadows catheter, oxygen, peripheral IV, telemetry upon PT entry to room.     General Precautions: Standard, fall, hearing impaired   Orthopedic Precautions:N/A   Braces: N/A  Respiratory Status: Nasal cannula, flow 2.5 L/min     Functional Mobility:  · Bed Mobility:     · Supine to Sit: contact guard assistance and  minimum assistance  · Transfers:     · Sit to Stand:  stand by assistance with rolling walker  · Bed to Chair: contact guard assistance with  rolling walker  using  ambulation  · Gait: amb 16' with RW and CGA with O2 donned and IV in tow  · Balance: fair standing balance with RW      AM-PAC 6 CLICK MOBILITY  Turning over in bed (including adjusting bedclothes, sheets and blankets)?: 3  Sitting down on and standing up from a chair with arms (e.g., wheelchair, bedside commode, etc.): 3  Moving from lying on back to sitting on the side of the bed?: 3  Moving to and from a bed to a chair (including a wheelchair)?: 3  Need to walk in hospital room?: 3  Climbing 3-5 steps with a railing?: 1  Basic Mobility Total Score: 16       Therapeutic Activities and Exercises: le seated ex's X 10-12 reps.       Patient left supine with all lines intact, call button in reach and friends present..    GOALS:   Multidisciplinary Problems     Physical Therapy Goals        Problem: Physical Therapy    Goal Priority Disciplines Outcome Goal Variances Interventions   Physical Therapy Goal     PT, PT/OT Ongoing, Progressing     Description: Goals to be met by: discharge date     Patient will increase functional independence with mobility by performin. Supine to sit with Modified [place]  2. Sit to supine with Modified [place]  3. Sit to stand transfer with Modified [place]  4. Bed to chair transfer with Modified [place] and Supervision using Rolling Walker and janeen OOB chair > 1 hr  5. Gait  x 100 feet with Stand-by Assistance using Rolling Walker.   6. Lower extremity exercise program x15 reps                    Time Tracking:     PT Received On: 22  PT Start Time: 1305     PT Stop Time: 1331  PT Total Time (min): 26 min     Billable Minutes: Gait Training 15 and Therapeutic Exercise 11    Treatment Type: Treatment  PT/PTA: PTA     PTA Visit Number: 3     2022

## 2022-04-21 NOTE — ASSESSMENT & PLAN NOTE
· Creatinine 2.8 on admit, baseline around 1.1 - was 5.9 a week ago - 1.7 today  · Has been on Torsemide, but also with decreased PO intake  · Renal US without hydro  · Stop Torsemide for now  · Continue with gentle IVF boluses as Creatinine is improving

## 2022-04-21 NOTE — PLAN OF CARE
04/21/22 1328   Post-Acute Status   Post-Acute Authorization IV Infusion;Home Health   Home Health Status Pending medical clearance/testing   IV Infusion Status Post acute provider reviewing for acceptance  (Awaiting Final ID recs)     ----MD Mesfin Fry Ii, Ii, [facility] - GeneralInternal [phone] [facility] [id number] Steps: Follow up on 4/21/2022Appointment: Instructions: at 11:00 AM; previously scheduled PCP appointment  ----MD Shireen Brunner [phone] [facility] [address]Jqwh Steps: Follow up on 5/2/2022Appointment: Instructions: at 11:00 AM; previously scheduled urology appointment  ----MD René Austin MDInfectious [phone] [facility] [address]Dwow Steps: Follow up in 2 week(s)Appointment: Instructions: Infectious disease clinic to call the patient with appointment date & time.  ----[facility] [address] [place] [address], [place] [address] Phone: [phone] Steps: CallAppointment: Instructions: Call to apply for Meals-on-Wheels, transportation assistance, and additional services provided by the [place] [facility].  ----[facility]Park[facility] [phone] [facility] [id number] Steps: CallAppointment: Instructions: As needed, [facility] [phone] [facility] [address]Cnse Steps: CallAppointment: Instructions: As needed, HOME INFUSION COMPANY

## 2022-04-21 NOTE — PLAN OF CARE
Problem: Physical Therapy  Goal: Physical Therapy Goal  Description: Goals to be met by: discharge date     Patient will increase functional independence with mobility by performin. Supine to sit with Modified [place]  2. Sit to supine with Modified [place]  3. Sit to stand transfer with Modified [place]  4. Bed to chair transfer with Modified [place] and Supervision using Rolling Walker and janeen OOB chair > 1 hr  5. Gait  x 100 feet with Stand-by Assistance using Rolling Walker.   6. Lower extremity exercise program x15 reps   Outcome: Ongoing, Progressing

## 2022-04-21 NOTE — PROGRESS NOTES
Patients Iv infiltrated. Writer applied gauze and tape to area and educated patient on elevating extremity. Writer attempted placing Iv twice with no success. Writer notified charge nurse Ivis of need for IV placement and Charge nurse Ivis call for ICU nurse to place IV. Writer awaiting IV placement from ICU nurse.

## 2022-04-21 NOTE — ASSESSMENT & PLAN NOTE
· Chronic issue likely from Dilaudid pain pump  · HR 45s-55s, asymptomatic  · Continuous telemetry monitoring   · Keep Mag >2, K >4

## 2022-04-21 NOTE — PT/OT/SLP PROGRESS
Occupational Therapy      Patient Name:  Tasha Hawley   MRN:  [id number]    Patient not seen today secondary to Patient unwilling to participate (pt stated she was cold and just wanted to sleep.). Will follow-up 4/22/2022.    4/21/2022

## 2022-04-21 NOTE — PHYSICIAN QUERY
PT Name: Tasha Hawley  MR #: [id number]     DOCUMENTATION CLARIFICATION     CDS/: ADOLPH Luis, RN, CDS               Contact information:[email or web address]   This form is a permanent document in the medical record.     Query Date: April 21, 2022    By submitting this query, we are merely seeking further clarification of documentation.  Please utilize your independent clinical judgment when addressing the question(s) below.    The Medical Record contains the following   Indicators Supporting Clinical Findings Location in Medical Record   X Heart Failure documented  Congestive heart failure    Dr. Taurus TRAVIS, 4/20   X BNP  4/14   BNP 94    .    Lab 4/14   X EF/Echo  TTE 7/7/2021  The left ventricle is normal in size with normal systolic function.The estimated ejection fraction is 55%.  Normal left ventricular diastolic function.  Mild tricuspid regurgitation.  Normal right ventricular size with normal right ventricular systolic function.  The estimated PA systolic pressure is 47 mmHg.  There is pulmonary hypertension.        Echo interpretation:  The left ventricle is normal in size with normal systolic function.  The estimated ejection fraction is 55%.  Mild mitral regurgitation.  Normal left ventricular diastolic function.  Mild tricuspid regurgitation.  Normal right ventricular size with normal right ventricular systolic function.  There is no pulmonary hypertension.       Dr. Taurus TRAVIS, 4/20                       Echo, 4/20   X Radiology findings  CXR chest impression:  Chronic appearing interstitial findings, no large focal consolidation    CXR 4/14    Subjective/Objective Respiratory Conditions      Recent/Current MI      Heart Transplant, LVAD      Edema, JVD      Ascites     X Diuretics/Meds  Furosemide 60mg IV once     Has been on Torsemide, Hold Torsemide    MAR 4/17     Dr. Taurus TRAVIS, 4/20    Other Treatment      Other       Heart failure is a clinical diagnosis which includes symptomatic  fluid retention, elevated intracardiac pressures, and/or the inability of the heart to deliver adequate blood flow.    Heart Failure with reduced Ejection Fraction (HFrEF) or Systolic Heart Failure (loses ability to contract normally, EF is <40%)    Heart Failure with preserved Ejection Fraction (HFpEF) or Diastolic Heart Failure (stiff ventricles, does not relax properly, EF is >50%)     Heart Failure with Combined Systolic and Diastolic Failure (stiff ventricles, does not relax properly and EF is <50%)    Mid-range or mildly reduced ejection fraction (HFmrEF) is classified as systolic heart failure.   Common clues to acute exacerbation:  Rapidly progressive symptoms (w/in 2 weeks of presentation), using IV diuretics, using supplemental O2, pulmonary edema on Xray, new or worsening pleural effusion, +JVD or other signs of volume overload, MI w/in 4 weeks, and/or BNP >500  The clinical guidelines noted are only system guidelines, and do not replace the providers clinical judgment.    Provider, please specify the Congestive Heart Failure diagnosis associated with the above clinical findings.    [   ]  Chronic Diastolic Heart Failure (HFpEF) - preexisting and stable     [   ]  Other (please specify): ___________________________________     [ x ]  Clinically Undetermined       Please document in your progress notes daily for the duration of treatment until resolved and include in your discharge summary.    References:  American Heart Association editorial staff. (2017, May). Ejection Fraction Heart Failure Measurement. American Heart Association. https://www.heart.org/en/health-topics/heart-failure/diagnosing-heart-failure/ejection-fraction-heart-failure-measurement#:~:text=Ejection%20fraction%20(EF)%20is%20a,pushed%20out%20with%20each%20heartbeat  HAO Leone (2020, December 15). Heart failure with preserved ejection fraction: Clinical manifestations and diagnosis. UpToDate.  https://www.[employer]/contents/heart-failure-with-preserved-ejection-fraction-clinical-manifestations-and-diagnosis.  ICD-10-CM/PCS Coding Clinic Third Quarter ICD-10, Effective with discharges: September 8, 2020 [facility] § Heart failure with mid-range or mildly reduced ejection fraction (2020).  Form No. 57556

## 2022-04-22 ENCOUNTER — TELEPHONE (OUTPATIENT)
Dept: INTERNAL MEDICINE | Facility: CLINIC | Age: 67
End: 2022-04-22
Payer: MEDICARE

## 2022-04-22 ENCOUNTER — PATIENT OUTREACH (OUTPATIENT)
Dept: ADMINISTRATIVE | Facility: OTHER | Age: 67
End: 2022-04-22
Payer: MEDICARE

## 2022-04-22 LAB
ANION GAP SERPL CALC-SCNC: 8 MMOL/L (ref 8–16)
BUN SERPL-MCNC: 12 MG/DL (ref 8–23)
CALCIUM SERPL-MCNC: 9.1 MG/DL (ref 8.7–10.5)
CHLORIDE SERPL-SCNC: 104 MMOL/L (ref 95–110)
CO2 SERPL-SCNC: 30 MMOL/L (ref 23–29)
CREAT SERPL-MCNC: 1.2 MG/DL (ref 0.5–1.4)
EST. GFR  (AFRICAN AMERICAN): 55 ML/MIN/1.73 M^2
EST. GFR  (NON AFRICAN AMERICAN): 48 ML/MIN/1.73 M^2
GLUCOSE SERPL-MCNC: 86 MG/DL (ref 70–110)
POCT GLUCOSE: 101 MG/DL (ref 70–110)
POTASSIUM SERPL-SCNC: 4.1 MMOL/L (ref 3.5–5.1)
SODIUM SERPL-SCNC: 142 MMOL/L (ref 136–145)

## 2022-04-22 PROCEDURE — 25000003 PHARM REV CODE 250: Performed by: HOSPITALIST

## 2022-04-22 PROCEDURE — 63600175 PHARM REV CODE 636 W HCPCS: Performed by: NURSE PRACTITIONER

## 2022-04-22 PROCEDURE — 97530 THERAPEUTIC ACTIVITIES: CPT

## 2022-04-22 PROCEDURE — 25000003 PHARM REV CODE 250: Performed by: NURSE PRACTITIONER

## 2022-04-22 PROCEDURE — 97110 THERAPEUTIC EXERCISES: CPT | Mod: CQ

## 2022-04-22 PROCEDURE — 97535 SELF CARE MNGMENT TRAINING: CPT

## 2022-04-22 PROCEDURE — 11000001 HC ACUTE MED/SURG PRIVATE ROOM

## 2022-04-22 PROCEDURE — 80048 BASIC METABOLIC PNL TOTAL CA: CPT | Performed by: HOSPITALIST

## 2022-04-22 PROCEDURE — 36415 COLL VENOUS BLD VENIPUNCTURE: CPT | Performed by: HOSPITALIST

## 2022-04-22 PROCEDURE — 94799 UNLISTED PULMONARY SVC/PX: CPT

## 2022-04-22 PROCEDURE — 99900035 HC TECH TIME PER 15 MIN (STAT)

## 2022-04-22 PROCEDURE — 94761 N-INVAS EAR/PLS OXIMETRY MLT: CPT

## 2022-04-22 PROCEDURE — 63600175 PHARM REV CODE 636 W HCPCS: Performed by: HOSPITALIST

## 2022-04-22 PROCEDURE — 97116 GAIT TRAINING THERAPY: CPT | Mod: CQ

## 2022-04-22 RX ORDER — CEPHALEXIN 250 MG/5ML
500 POWDER, FOR SUSPENSION ORAL EVERY 12 HOURS
Qty: 200 ML | Refills: 0 | Status: SHIPPED | OUTPATIENT
Start: 2022-04-22 | End: 2022-04-29

## 2022-04-22 RX ORDER — CEPHALEXIN 250 MG/5ML
500 POWDER, FOR SUSPENSION ORAL EVERY 12 HOURS
Status: DISCONTINUED | OUTPATIENT
Start: 2022-04-22 | End: 2022-04-25 | Stop reason: HOSPADM

## 2022-04-22 RX ORDER — CEPHALEXIN 250 MG/5ML
500 POWDER, FOR SUSPENSION ORAL EVERY 12 HOURS
Status: DISCONTINUED | OUTPATIENT
Start: 2022-04-22 | End: 2022-04-22

## 2022-04-22 RX ORDER — PROMETHAZINE HYDROCHLORIDE 12.5 MG/1
12.5 TABLET ORAL EVERY 6 HOURS PRN
Status: DISCONTINUED | OUTPATIENT
Start: 2022-04-22 | End: 2022-04-22

## 2022-04-22 RX ORDER — PROMETHAZINE HYDROCHLORIDE 6.25 MG/5ML
12.5 SYRUP ORAL EVERY 6 HOURS PRN
Status: DISCONTINUED | OUTPATIENT
Start: 2022-04-22 | End: 2022-04-25 | Stop reason: HOSPADM

## 2022-04-22 RX ORDER — SODIUM CHLORIDE, SODIUM LACTATE, POTASSIUM CHLORIDE, CALCIUM CHLORIDE 600; 310; 30; 20 MG/100ML; MG/100ML; MG/100ML; MG/100ML
INJECTION, SOLUTION INTRAVENOUS CONTINUOUS
Status: ACTIVE | OUTPATIENT
Start: 2022-04-22 | End: 2022-04-22

## 2022-04-22 RX ADMIN — HEPARIN SODIUM 5000 UNITS: 5000 INJECTION INTRAVENOUS; SUBCUTANEOUS at 10:04

## 2022-04-22 RX ADMIN — CEPHALEXIN 500 MG: 250 POWDER, FOR SUSPENSION ORAL at 10:04

## 2022-04-22 RX ADMIN — HEPARIN SODIUM 5000 UNITS: 5000 INJECTION INTRAVENOUS; SUBCUTANEOUS at 02:04

## 2022-04-22 RX ADMIN — SODIUM CHLORIDE, SODIUM LACTATE, POTASSIUM CHLORIDE, AND CALCIUM CHLORIDE 500 ML: .6; .31; .03; .02 INJECTION, SOLUTION INTRAVENOUS at 01:04

## 2022-04-22 RX ADMIN — TRAMADOL HYDROCHLORIDE 50 MG: 50 TABLET, COATED ORAL at 12:04

## 2022-04-22 RX ADMIN — SENNOSIDES 2 TABLET: 8.6 TABLET, FILM COATED ORAL at 10:04

## 2022-04-22 RX ADMIN — FLUOXETINE HYDROCHLORIDE 60 MG: 20 CAPSULE ORAL at 08:04

## 2022-04-22 RX ADMIN — SENNOSIDES 2 TABLET: 8.6 TABLET, FILM COATED ORAL at 08:04

## 2022-04-22 RX ADMIN — SODIUM CHLORIDE, SODIUM LACTATE, POTASSIUM CHLORIDE, AND CALCIUM CHLORIDE: .6; .31; .03; .02 INJECTION, SOLUTION INTRAVENOUS at 12:04

## 2022-04-22 RX ADMIN — POLYETHYLENE GLYCOL 3350 17 G: 17 POWDER, FOR SOLUTION ORAL at 08:04

## 2022-04-22 RX ADMIN — LIDOCAINE 1 PATCH: 50 PATCH CUTANEOUS at 02:04

## 2022-04-22 RX ADMIN — HEPARIN SODIUM 5000 UNITS: 5000 INJECTION INTRAVENOUS; SUBCUTANEOUS at 05:04

## 2022-04-22 RX ADMIN — GABAPENTIN 100 MG: 100 CAPSULE ORAL at 10:04

## 2022-04-22 RX ADMIN — PANTOPRAZOLE SODIUM 40 MG: 40 TABLET, DELAYED RELEASE ORAL at 08:04

## 2022-04-22 RX ADMIN — CEFAZOLIN SODIUM 2 G: 2 SOLUTION INTRAVENOUS at 05:04

## 2022-04-22 RX ADMIN — PROMETHAZINE HYDROCHLORIDE 12.5 MG: 25 INJECTION INTRAMUSCULAR; INTRAVENOUS at 09:04

## 2022-04-22 RX ADMIN — TRAZODONE HYDROCHLORIDE 300 MG: 100 TABLET ORAL at 10:04

## 2022-04-22 RX ADMIN — OXYBUTYNIN CHLORIDE 15 MG: 5 TABLET, EXTENDED RELEASE ORAL at 08:04

## 2022-04-22 RX ADMIN — ATORVASTATIN CALCIUM 80 MG: 40 TABLET, FILM COATED ORAL at 08:04

## 2022-04-22 RX ADMIN — QUETIAPINE FUMARATE 200 MG: 100 TABLET ORAL at 10:04

## 2022-04-22 RX ADMIN — LEVOTHYROXINE SODIUM 125 MCG: 0.03 TABLET ORAL at 05:04

## 2022-04-22 RX ADMIN — MUPIROCIN: 20 OINTMENT TOPICAL at 10:04

## 2022-04-22 NOTE — PLAN OF CARE
[facility]  [address]  [facility] [address]  Phone: [phone]    Home Health Orders  Face to Face Encounter    Patient Name: Tasha Hawley  YOB: 1955    PCP: Mesfin Hodges Ii, MD   PCP Address: [address] / [place] [address]  PCP Phone Number: [phone]  PCP Fax: [phone]    Encounter Date: 04/22/2022    Admit To Home Health    Diagnoses:  Active Hospital Problems    Diagnosis  POA    *CARITO (acute kidney injury) [N17.9]  Yes     Priority: 1 - High    Staph aureus infection [A49.01]  Yes     Priority: 2     Abdominal pain [R10.9]  Yes     Priority: 2     Urinary tract infection associated with cystostomy catheter [T83.510A, N39.0]  Yes     Priority: 2     Neurogenic bladder [N31.9]  Yes     Priority: 3      Chronic    Class 1 obesity due to excess calories in adult [E66.09]  Yes    Anxiety [F41.9]  Yes    Bradycardia [R00.1]  Yes    Paresthesia of both lower extremities [R20.2]  Yes    Lymphedema of both lower extremities [I89.0]  Yes     Chronic    Primary hypothyroidism [E03.9]  Yes     Chronic    Frequent falls [R29.6]  Not Applicable    Chronic pain syndrome [G89.4]  Yes     Chronic    Major depressive disorder, recurrent, mild [F33.0]  Yes    Sleep apnea [G47.30]  Yes      Resolved Hospital Problems   No resolved problems to display.       Future Appointments   Date Time Provider Department Center   5/2/2022 11:00 AM Shireen Mayo MD [facility] UROLOGY [facility]   5/3/2022  9:20 AM Mesfin Hodges II, MD [facility] IM [facility] PCW   5/19/2022  8:30 AM René Gomez MD [facility] ID [facility]   7/29/2022  8:30 AM Juan A Madera MD [facility] PSYCH [facility]      Follow-up Information     Mesfin Hodges Ii, MD Follow up in 1 week(s).    Specialty: Internal Medicine  Why: Dr. Hodges's  to call the patient with appointment date & time.  Contact information:  [address]  [facility] [address]  [phone]             Shireen Mayo MD  Follow up on 5/2/2022.    Specialty: Urology  Why: at 11:00 AM; previously scheduled urology appointment  Contact information:  [address]  [facility] [address]  [phone]             René Gomez MD Follow up in 2 week(s).    Specialty: Infectious Diseases  Why: Infectious disease clinic to call the patient with appointment date & time.  Contact information:  [address]  [facility] [address]  [phone]             [facility]. Call.    Why: Call to apply for Meals-on-Wheels, transportation assistance, and additional services provided by the [place] [facility].  Contact information:  Address: [address] [place] [address], [place] [address]  Phone: [phone]           [facility]. Call.    Why: As needed, HOME HEALTH  Contact information:  [address] [facility] [address] [phone]                         I have seen and examined this patient face to face today. My clinical findings that support the need for the home health skilled services and home bound status are the following:  Weakness/numbness causing balance and gait disturbance due to Weakness/Debility making it taxing to leave home.      Allergies:  Review of patient's allergies indicates:   Allergen Reactions    (d)-limonene flavor      Other reaction(s): difficult intubation  Other reaction(s): Difficulty breathing    Bactrim [sulfamethoxazole-trimethoprim] Anaphylaxis    Benadryl [diphenhydramine hcl] Shortness Of Breath    Fentanyl Itching, Nausea And Vomiting and Swelling             Imitrex [sumatriptan succinate] Shortness Of Breath    Topamax [topiramate] Shortness Of Breath    Vancomycin Shortness Of Breath     Rash    Butorphanol tartrate     Darvocet a500 [propoxyphene n-acetaminophen]      Other reaction(s): Difficulty breathing    White petrolatum-zinc     Zinc oxide-white petrolatum      Other reaction(s): Difficulty breathing     Latex, natural rubber Itching and Rash    Phenytoin Rash and Other (See Comments)     Trouble breathing         Code Status:    Code Status: Full Code      Diet: Low Sodium      Referrals/ Consults     Physical Therapy to evaluate and treat     Occupational Therapy to evaluate and treat        Activities:   activity as tolerated      Wound Care Orders:  no      Miscellaneous:   Motley Care: Instruct patient/caregiver to empty Motley bag.  Change Motley every month.        Nursing:   Agency to admit patient within 24 hours of hospital discharge unless specified on physician order or at patient request    SN to complete comprehensive assessment including routine vital signs. Instruct on disease process and s/s of complications to report to MD. Review/verify medication list sent home with the patient at time of discharge  and instruct patient/caregiver as needed. Frequency may be adjusted depending on start of care date.     Skilled nurse to perform up to 3 visits PRN for symptoms related to diagnosis    Notify MD if SBP > 160 or < 90; DBP > 90 or < 50; HR > 120 or < 50; Temp > 101; O2 < 88%    Ok to schedule additional visits based on staff availability and patient request on consecutive days within the home health episode.      When multiple disciplines ordered:    Start of Care occurs on Sunday - Wednesday schedule remaining discipline evaluations as ordered on separate consecutive days following the start of care.    Thursday SOC -schedule subsequent evaluations Friday and Monday the following week.     Friday - Saturday SOC - schedule subsequent discipline evaluations on consecutive days starting Monday of the following week.      For all post-discharge communication and subsequent orders please contact patient's primary care physician. If unable to reach primary care physician or do not receive response within 30 minutes, [facility] for clinical staff order clarification      Medications:  Review discharge medications with patient and family and provide education.      Current Discharge Medication List      START taking these medications    Details   cephALEXin (KEFLEX) 250 mg/5 mL suspension Take 10 mLs (500 mg total) by mouth every 12 (twelve) hours. for 10 days  Qty: 200 mL, Refills: 0         CONTINUE these medications which have NOT CHANGED    Details   aspirin (ECOTRIN) 81 MG EC tablet Take 1 tablet (81 mg total) by mouth once daily.  Refills: 0      atorvastatin (LIPITOR) 80 MG tablet TAKE ONE TABLET BY MOUTH EVERY DAY  Qty: 90 tablet, Refills: 3    Associated Diagnoses: Mixed hyperlipidemia      butalbital-acetaminophen-caffeine -40 mg (FIORICET, ESGIC) -40 mg per tablet Take 1 tablet by mouth daily as needed.  Qty: 15 tablet, Refills: 0      FLUoxetine 20 MG capsule Take 3 capsules (60 mg total) by mouth once daily.  Qty: 270 capsule, Refills: 1    Associated Diagnoses: Anxiety      levothyroxine (SYNTHROID) 125 MCG tablet Take 1 tablet (125 mcg total) by mouth before breakfast.  Qty: 90 tablet, Refills: 3    Associated Diagnoses: Primary hypothyroidism      lidocaine (LIDODERM) 5 % daily as needed.       nitroGLYCERIN (NITROSTAT) 0.4 MG SL tablet Place 1 tablet (0.4 mg total) under the tongue every 5 (five) minutes as needed for Chest pain.  Qty: 25 tablet, Refills: 3    Associated Diagnoses: Coronary artery disease involving native coronary artery of native heart with angina pectoris      ondansetron (ZOFRAN-ODT) 4 MG TbDL Take 1 tablet (4 mg total) by mouth every 8 (eight) hours as needed (nausea).  Qty: 12 tablet, Refills: 0      oxybutynin (DITROPAN XL) 15 MG TR24 Take 1 tablet (15 mg total) by mouth once daily.  Qty: 90 tablet, Refills: 3    Associated Diagnoses: Neurogenic bladder      pantoprazole (PROTONIX) 40 MG tablet TAKE ONE TABLET BY MOUTH EVERY DAY  Qty: 90 tablet, Refills: 3      promethazine (PHENERGAN) 25 MG tablet Take 25 mg by mouth every 6 (six) hours as  needed for Nausea.      !! QUEtiapine (SEROQUEL) 100 MG Tab Take 2 tablets (200 mg total) by mouth nightly.  Qty: 180 tablet, Refills: 3    Comments: Take 200 mg at bedtime  Associated Diagnoses: Insomnia due to medical condition      !! QUEtiapine (SEROQUEL) 25 MG Tab Take 12.5-25 mg twice daily as needed for anxiety  Qty: 180 tablet, Refills: 1      senna (SENNA LAX) 8.6 mg tablet Take 2 tablets by mouth 2 (two) times daily.      traZODone (DESYREL) 100 MG tablet Take 3 tablets (300 mg total) by mouth every evening.  Qty: 270 tablet, Refills: 1    Associated Diagnoses: Insomnia, unspecified type      intrathecal pain pump compound Hydromorphone (7.5 mg/mL) infusion at 3.6799 mg/day (0.1533 mg/hr) out of a total reservoir volume of 37.3 mL  Pump filled every 2 months      promethazine (PHENERGAN) 6.25 mg/5 mL syrup Take 10 mLs (12.5 mg total) by mouth every 6 (six) hours as needed.  Qty: 473 mL, Refills: 0       !! - Potential duplicate medications found. Please discuss with provider.      STOP taking these medications       potassium chloride (MICRO-K) 10 MEQ CpSR Comments:   Reason for Stopping:         torsemide (DEMADEX) 100 MG Tab Comments:   Reason for Stopping:               I certify that this patient is confined to her home and needs intermittent skilled nursing care, physical therapy and occupational therapy.    Rebecca Condon MD  [facility] Medicine

## 2022-04-22 NOTE — SUBJECTIVE & OBJECTIVE
Interval History: No acute events overnight.  BP improved and Creatinine better with IVF to 1.2.  Can hopefully go home tomorrow if remains stable.  Will need to discuss with ID if they will approve home IV Ancef on DC per patient request.    Review of Systems   Constitutional:  Negative for chills, fatigue and fever.   Respiratory:  Negative for cough and shortness of breath.    Cardiovascular:  Negative for chest pain, palpitations and leg swelling.   Gastrointestinal:  Positive for abdominal pain and nausea. Negative for diarrhea and vomiting.   Genitourinary:  Negative for dysuria and urgency.   Neurological:  Negative for dizziness and headaches.   All other systems reviewed and are negative.  Objective:     Vital Signs (Most Recent):  Temp: 98.9 °F (37.2 °C) (04/22/22 0833)  Pulse: 65 (04/22/22 0900)  Resp: 16 (04/22/22 0833)  BP: 138/68 (04/22/22 0900)  SpO2: (!) 93 % (04/22/22 0833)   Vital Signs (24h Range):  Temp:  [96.1 °F (35.6 °C)-98.9 °F (37.2 °C)] 98.9 °F (37.2 °C)  Pulse:  [49-65] 65  Resp:  [16-20] 16  SpO2:  [91 %-100 %] 93 %  BP: ()/(44-68) 138/68     Weight: 86.6 kg (191 lb)  Body mass index is 32.79 kg/m².    Intake/Output Summary (Last 24 hours) at 4/22/2022 1047  Last data filed at 4/22/2022 0500  Gross per 24 hour   Intake 430.07 ml   Output 3600 ml   Net -3169.93 ml        Physical Exam  Constitutional:       Appearance: Normal appearance. She is well-developed. She is obese.   HENT:      Head: Normocephalic and atraumatic.   Cardiovascular:      Rate and Rhythm: Regular rhythm. Bradycardia present.      Heart sounds: No murmur heard.  Pulmonary:      Effort: Pulmonary effort is normal. No respiratory distress.      Breath sounds: Normal breath sounds. No wheezing or rales.   Abdominal:      General: There is no distension.      Palpations: Abdomen is soft.      Tenderness: There is abdominal tenderness (Mideipigastrium). There is right CVA tenderness and left CVA tenderness.       Comments: Suprapubic catheter   Musculoskeletal:         General: No deformity.      Right lower leg: Edema present.      Left lower leg: Edema present.   Skin:     General: Skin is warm.   Neurological:      General: No focal deficit present.      Mental Status: She is alert and oriented to person, place, and time. Mental status is at baseline.      Comments: Chronic right sided facial droop       Significant Labs: All pertinent labs within the past 24 hours have been reviewed.  BMP:   Recent Labs   Lab 04/21/22  0903 04/22/22  0339   * 86    142   K 4.2 4.1   CL 99 104   CO2 32* 30*   BUN 14 12   CREATININE 1.7* 1.2   CALCIUM 9.6 9.1   MG 2.1  --        Urine Culture: No results for input(s): LABURIN in the last 48 hours.    Significant Imaging: I have reviewed all pertinent imaging results/findings within the past 24 hours.

## 2022-04-22 NOTE — PLAN OF CARE
Problem: Occupational Therapy  Goal: Occupational Therapy Goal  Description: Goals to be met by: 4/30/2022    Patient will increase functional independence with ADLs by performing:    UE Dressing with Modified [place].  LE Dressing with Minimal Assistance.  Grooming while seated with Modified [place].  Toileting from bedside commode with Stand-by Assistance for hygiene and clothing management.   Supine to sit with Modified [place].  Step transfer with Stand-by Assistance  Toilet transfer to bedside commode with Stand-by Assistance.  Increased functional strength to WFL for ADLS.  Upper extremity exercise program x 10 reps per handout, with supervision.    Outcome: Ongoing, Progressing     Problem: Occupational Therapy  Goal: Occupational Therapy Goal  Description: Goals to be met by: 4/30/2022    Patient will increase functional independence with ADLs by performing:    UE Dressing with Modified [place].  LE Dressing with Minimal Assistance.  Grooming while seated with Modified [place]. Goal met 4/22/2022  Toileting from bedside commode with Stand-by Assistance for hygiene and clothing management.   Supine to sit with Modified [place].  Step transfer with Stand-by Assistance  Toilet transfer to bedside commode with Stand-by Assistance.  Increased functional strength to WFL for ADLS.  Upper extremity exercise program x 10 reps per handout, with supervision.    4/22/2022 1243 by Lindsey Perry OT  Outcome: Ongoing, Progressing   Pt. Met 1/9 OT goals today.

## 2022-04-22 NOTE — PLAN OF CARE
04/22/22 0919   Post-Acute Status   Post-Acute Authorization Home Health   Home Health Status Pending medical clearance/testing     Anticipate possible DC today. Assigned TN to followup for final note.    Recent Labs   Lab 04/22/22  0339      K 4.1      CO2 30*   BUN 12   CREATININE 1.2     ----Mesfin Hodges Ii, [facility] [phone] [facility] [address]Smrl Steps: Follow up on 4/21/2022Appointment: Instructions: at 11:00 AM; previously scheduled PCP appointment  -----MD Shireen Brunner [phone] [facility] [address]Gewh Steps: Follow up on 5/2/2022Appointment: Instructions: at 11:00 AM; previously scheduled urology appointment  -----MD René Austin MDInfectious [phone] [facility] [address]Nnvr Steps: Follow up in 2 week(s)Appointment: Instructions: Infectious disease clinic to call the patient with appointment date & time.  -----[facility] [address] [place] [address], [place] [address] Phone: [phone] Steps: CallAppointment: Instructions: Call to apply for Meals-on-Wheels, transportation assistance, and additional services provided by the [place] [facility].  ----[facility] [phone] [facility] [id number] Steps: CallAppointment: Instructions: As needed, HOME HEALTHNo follow-up has been documented.    Future Appointments   Date Time Provider Department Center   5/2/2022 11:00 AM Shireen Mayo MD [facility] UROLOGY [facility]   5/3/2022  9:20 AM Mesfin Hodges II, MD [facility] IM [facility] PCW   5/19/2022  8:30 AM René Gomez MD [facility] ID [facility]   7/29/2022  8:30 AM Juan A Madera MD [facility] PSYCH [facility]     /68   Pulse  "65   Temp 98.9 °F (37.2 °C)   Resp 16   Ht 5' 4" (1.626 m)   Wt 86.6 kg (191 lb)   LMP  (LMP Unknown)   SpO2 (!) 93%   BMI 32.79 kg/m²      atorvastatin  80 mg Oral Daily    ceFAZolin (ANCEF) IVPB  2 g Intravenous Q12H    FLUoxetine  60 mg Oral Daily    gabapentin  100 mg Oral QHS    heparin (porcine)  5,000 Units Subcutaneous Q8H    levothyroxine  125 mcg Oral Before breakfast    LIDOcaine  1 patch Transdermal Q24H    mupirocin   Nasal BID    oxybutynin  15 mg Oral Daily    pantoprazole  40 mg Oral Daily    polyethylene glycol  17 g Oral Daily    QUEtiapine  200 mg Oral Nightly    senna  2 tablet Oral BID    traZODone  300 mg Oral QHS       "

## 2022-04-22 NOTE — TELEPHONE ENCOUNTER
----- Message from Paola Palma sent at 4/22/2022  8:51 AM CDT -----  Regarding: Need reschedule  Patient is discharging from [facility].  I scheduled Hospital f/u on 5/3 as soonest available.  If that can be scheduled in 7 days that would be preferable.  Also patient had a physical scheduled but was still admitted.  Please reschedule physical. Message me back with appointments so that Discharge Nurse can relay appointment information to patient prior to discharge.    DX: CARITO (acute kidney injury)    Thanks, Avis  Access Navigator/Discharge

## 2022-04-22 NOTE — PROGRESS NOTES
RSW met with patient to discuss resources, discharge, and additional patient barriers to care. Pt interested in Meals-on-Wheels services through [facility]. RSW contacted [facility] and referred pt to their services, pt expressed understanding of referral process. Per pt, pt is not in need of additional resources at this time.    Damaris Combs, ML

## 2022-04-22 NOTE — ASSESSMENT & PLAN NOTE
· Creatinine 2.8 on admit, baseline around 1.1 - was 5.9 a week ago - 1.2 today  · Has been on Torsemide, but also with decreased PO intake  · Renal US without hydro  · Stop Torsemide for now  · Continue with gentle IVF boluses as Creatinine is improving

## 2022-04-22 NOTE — CARE UPDATE
Informed patient has agreed to go home tomorrow on syrup formulations of Keflex and Phenergan.  Orders sent to pharmacy for bedside delivery today.  Home health orders done.  Plan to DC home tomorrow assuming Creatinine stable/improved.    Rebecca oCndon MD  [facility]

## 2022-04-22 NOTE — PLAN OF CARE
6625  Message sent to  Paola Palma requesting a hospfu appt with Dr. Mesfin Hodges. Patient missed the previously scheduled appointment on 4/21/2022 due to her continued hospitalization. Awaiting response.    0900  CM was informed by  Elza Palma of message sent to Dr Hodges's  requesting a hospfu appointment. Heidi Hodges's  to call the patient with appointment date & time.     3253  Message sent to [facility] informing of  orders noted & planned dc tomorrow. PO keflex & phenergan prescriptions to be filled by [facility] out-pt [facility] & delivered to the bedside today.       Will continue to follow.

## 2022-04-22 NOTE — PT/OT/SLP PROGRESS
Physical Therapy Treatment    Patient Name:  Tasha Hawley   MRN:  [id number]    Recommendations:     Discharge Recommendations:  home health PT   Discharge Equipment Recommendations: none   Barriers to discharge: decreased mobility,strength and endurance    Assessment:     Tasha Hawley is a 66 y.o. female admitted with a medical diagnosis of CARITO (acute kidney injury).  She presents with the following impairments/functional limitations:  weakness, impaired endurance, impaired functional mobilty, gait instability, impaired balance, decreased lower extremity function, decreased safety awareness, pain, decreased ROM, impaired coordination, impaired cardiopulmonary response to activity, impaired joint extensibility,pt with good participation and increased endurance,pt with decreased safety awareness and will benefit from  services upon discharge.    Rehab Prognosis: Fair; patient would benefit from acute skilled PT services to address these deficits and reach maximum level of function.    Recent Surgery: * No surgery found *      Plan:     During this hospitalization, patient to be seen 5 x/week to address the identified rehab impairments via gait training, therapeutic activities, therapeutic exercises, neuromuscular re-education and progress toward the following goals:    · Plan of Care Expires:  05/15/22    Subjective     Chief Complaint: n/a  Patient/Family Comments/goals: pt wants to feel better.  Pain/Comfort:  · Pain Rating 1:  (no rating)  · Location - Orientation 1: lower  · Location 1: back (chronic)  · Pain Addressed 1: Reposition, Distraction      Objective:     Communicated with nsg prior to session.  Patient found up in chair with meadows catheter, oxygen, peripheral IV, telemetry upon PT entry to room.     General Precautions: Standard, fall, hearing impaired   Orthopedic Precautions:N/A   Braces: N/A  Respiratory Status: Nasal cannula, flow 2 L/min     Functional Mobility:  · Transfers:     · Sit  to Stand:  stand by assistance with rolling walker  · Gait: amb 85' X 2 with RW and SBA/CGA with IV in tow and X 1 standing rest  · Balance: fair standing balance with RW      AM-PAC 6 CLICK MOBILITY  Turning over in bed (including adjusting bedclothes, sheets and blankets)?: 3  Sitting down on and standing up from a chair with arms (e.g., wheelchair, bedside commode, etc.): 3  Moving from lying on back to sitting on the side of the bed?: 3  Moving to and from a bed to a chair (including a wheelchair)?: 3  Need to walk in hospital room?: 3  Climbing 3-5 steps with a railing?: 1  Basic Mobility Total Score: 16       Therapeutic Activities and Exercises: le seated ex's X 15 reps inc: ap,laq,hip flex.       Patient left up in chair with all lines intact, call button in reach and nsg notified..    GOALS: see general POC  Multidisciplinary Problems     Physical Therapy Goals        Problem: Physical Therapy    Goal Priority Disciplines Outcome Goal Variances Interventions   Physical Therapy Goal     PT, PT/OT Ongoing, Progressing     Description: Goals to be met by: discharge date     Patient will increase functional independence with mobility by performin. Supine to sit with Modified [place]  2. Sit to supine with Modified [place]  3. Sit to stand transfer with Modified [place]  4. Bed to chair transfer with Modified [place] and Supervision using Rolling Walker and janeen OOB chair > 1 hr  5. Gait  x 100 feet with Stand-by Assistance using Rolling Walker.   6. Lower extremity exercise program x15 reps                    Time Tracking:     PT Received On: 22  PT Start Time: 1300     PT Stop Time: 1328  PT Total Time (min): 28 min     Billable Minutes: Gait Training 17 and Therapeutic Exercise 11    Treatment Type: Treatment  PT/PTA: PTA     PTA Visit Number: 4     2022

## 2022-04-22 NOTE — ASSESSMENT & PLAN NOTE
· MSSA UTI associated with suprapubic catheter, meadows changed in the ER  · ID consulted:  Suggest 2 week course end date 5/2 - IV Ancef then PO Keflex on DC, but patient reports she wants IV abx on DC - will need to readdress with ID tomorrow

## 2022-04-22 NOTE — PT/OT/SLP PROGRESS
Occupational Therapy   Treatment    Name: Tasha Hawley  MRN: [id number]  Admitting Diagnosis:  CARITO (acute kidney injury)     The primary encounter diagnosis was Chronic bilateral low back pain with bilateral sciatica. Diagnoses of Weakness, Nausea, Staph aureus infection, Urinary tract infection associated with cystostomy catheter, initial encounter, CHF (congestive heart failure), and Chest pain, rule out acute myocardial infarction were also pertinent to this visit.  Pre-op Diagnosis: * No surgery found * s/p      Recommendations:     Discharge Recommendations: home health OT  Discharge Equipment Recommendations:  none  Barriers to discharge:  None    Assessment:     Tasha Hawley is a 66 y.o. female with a medical diagnosis of CARITO (acute kidney injury).  She presents with Performance deficits affecting function are weakness, impaired endurance, gait instability, impaired balance, decreased coordination, decreased upper extremity function, decreased lower extremity function, decreased safety awareness, pain.   Pt. juliann good progress toward OT goals, pt. met g/hygiene goal today- Frances seated BS chair.  Continue with OT POC.     Rehab Prognosis:  Good; patient would benefit from acute skilled OT services to address these deficits and reach maximum level of function.       Plan:     Patient to be seen 3 x/week to address the above listed problems via self-care/home management, therapeutic activities, therapeutic exercises  · Plan of Care Expires: 05/15/22  · Plan of Care Reviewed with: patient    Subjective     Pain/Comfort:  · Pain Rating 1:  (not rated)  · Location - Side 1: Bilateral  · Location - Orientation 1: lower  · Location 1: back  · Pain Addressed 1: Reposition, Distraction, Cessation of Activity  · Pain Rating Post-Intervention 1:  (no complaints)    Objective:     Communicated with: nurse prior to session.  Patient found HOB elevated with meadows catheter, oxygen, peripheral IV, bed alarm,  telemetry, SCD upon OT entry to room.    General Precautions: Standard, fall, hearing impaired   Orthopedic Precautions:N/A   Braces: N/A  Respiratory Status: Nasal cannula, flow 2 L/min     Occupational Performance:     Bed Mobility:    · Patient completed Rolling/Turning to Left with  stand by assistance and with side rail  · Patient completed Scooting/Bridging with supervision and with side rail  · Patient completed Supine to Sit with stand by assistance, with side rail and increased time     Functional Mobility/Transfers:  · Patient completed Sit <> Stand Transfer with contact guard assistance  with  rolling walker   · Patient completed Bed <> Chair Transfer using Step Transfer technique with contact guard assistance with rolling walker  · Functional Mobility: ambulated 20' with CGA using RW in room, vc for upright posture 2/2 flexed foward and vc for closer walker proximity.    Activities of Daily Living:  · Grooming: modified independence brushed teeth, washed face and hands seated BS chair  · Toileting: pt has dori, declined BSc use .      [facility] 6 Click ADL: 20    Treatment & Education:  Purpose of OT visit and POC  All questions/concerns addressed within scope  Safety education, fall prevention, call staff for BTB assist.    Patient left up in chair with all lines intact, call button in reach and nurse notifiedEducation:      GOALS:   Multidisciplinary Problems     Occupational Therapy Goals        Problem: Occupational Therapy    Goal Priority Disciplines Outcome Interventions   Occupational Therapy Goal     OT, PT/OT Ongoing, Progressing    Description: Goals to be met by: 4/30/2022    Patient will increase functional independence with ADLs by performing:    UE Dressing with Modified [place].  LE Dressing with Minimal Assistance.  Grooming while seated with Modified [place]. Goal met 4/22/2022  Toileting from bedside commode with Stand-by Assistance for hygiene and clothing management.   Supine  to sit with Modified [place].  Step transfer with Stand-by Assistance  Toilet transfer to bedside commode with Stand-by Assistance.  Increased functional strength to WFL for ADLS.  Upper extremity exercise program x 10 reps per handout, with supervision.                     Time Tracking:     OT Date of Treatment: 04/22/22  OT Start Time: 1116  OT Stop Time: 1141  OT Total Time (min): 25 min    Billable Minutes:Self Care/Home Management 15  Therapeutic Activity 10  Total Time 25    OT/SAMANTHA: OT     SAMANTHA Visit Number: 1    4/22/2022

## 2022-04-22 NOTE — PROGRESS NOTES
[facility] Medicine  Progress Note    Patient Name: Tasha Hawley  MRN: [id number]  Patient Class: IP- Inpatient   Admission Date: 4/14/2022  Length of Stay: 4 days  Attending Physician: Rebecca Condon MD  Primary Care Provider: Mesfin Hodges Ii, MD        Subjective:     Principal Problem:CARITO (acute kidney injury)        HPI:  Tasha Hawley is a 65 yo female with a past medical history of Depression, Anemia, Congestive heart failure, Renal disorder, Lumbar radiculopathy, Sleep apnea, Neurogenic bladder in which she has a suprapubic catheter, recurrent UTI, Chronic pain with epidural pain pump, Coronary artery disease, Hypothyroidism, and Sleep apnea. Her PCP is Dr. Mesfin Hodges. She is established with Dr. Karla Amezquita for Nephrology. Dr. Mayo is her Urologist. She has home health with [facility] . She presented to the ED at [facility] via EMS with a cc of generalized body aches for the past 3-4 days. Associated symptoms include poor oral intake, red and orange colored urine, back pain and nausea. She also reports constipation. She denies fever, diarrhea, headaches, or blurred vision.     ED workup revealed WBC UA 78, UA Nitrate +, UA Leukocytes 3+, Hgb 9.6, Hct 29.8, K 3.0, CO2 33, Cr 2.8, Albumin 3.4, ALT 9, Drug screen + Opiate, EKG SB, rate 47, CXR Chronic appearing interstitial findings, no large focal consolidation. IV Rocephin initiated. Urology consulted. Admitted to Hospital medicine for further evaluation and treatment.       Overview/Hospital Course:  Ms. Hawley was admitted to Hospital Medicine for management of a UTI.  She was started on Ceftriaxone.  Urology was consulted.  She was found to have MSSA and was changed to IV Ancef.  ID suggested 2 weeks for a complicated UTI, end date 5/2 - with plans to switch to PO Keflex on DC.  She continued to suffer from CARITO, hypotension, and bradycardia.  2D echo was ordered which was normal.  Her Torsemide was held.  She was given  small IVF boluses with improvement in her BP and Creatinine. HR continued to remain low, which was thought to be 2/2 her Dilaudid pain pump.  BP and Creatinine eventually improved.      Interval History: No acute events overnight.  BP improved and Creatinine better with IVF to 1.2.  Can hopefully go home tomorrow if remains stable.  Will need to discuss with ID if they will approve home IV Ancef on DC per patient request.    Review of Systems   Constitutional:  Negative for chills, fatigue and fever.   Respiratory:  Negative for cough and shortness of breath.    Cardiovascular:  Negative for chest pain, palpitations and leg swelling.   Gastrointestinal:  Positive for abdominal pain and nausea. Negative for diarrhea and vomiting.   Genitourinary:  Negative for dysuria and urgency.   Neurological:  Negative for dizziness and headaches.   All other systems reviewed and are negative.  Objective:     Vital Signs (Most Recent):  Temp: 98.9 °F (37.2 °C) (04/22/22 0833)  Pulse: 65 (04/22/22 0900)  Resp: 16 (04/22/22 0833)  BP: 138/68 (04/22/22 0900)  SpO2: (!) 93 % (04/22/22 0833)   Vital Signs (24h Range):  Temp:  [96.1 °F (35.6 °C)-98.9 °F (37.2 °C)] 98.9 °F (37.2 °C)  Pulse:  [49-65] 65  Resp:  [16-20] 16  SpO2:  [91 %-100 %] 93 %  BP: ()/(44-68) 138/68     Weight: 86.6 kg (191 lb)  Body mass index is 32.79 kg/m².    Intake/Output Summary (Last 24 hours) at 4/22/2022 1047  Last data filed at 4/22/2022 0500  Gross per 24 hour   Intake 430.07 ml   Output 3600 ml   Net -3169.93 ml        Physical Exam  Constitutional:       Appearance: Normal appearance. She is well-developed. She is obese.   HENT:      Head: Normocephalic and atraumatic.   Cardiovascular:      Rate and Rhythm: Regular rhythm. Bradycardia present.      Heart sounds: No murmur heard.  Pulmonary:      Effort: Pulmonary effort is normal. No respiratory distress.      Breath sounds: Normal breath sounds. No wheezing or rales.   Abdominal:      General:  There is no distension.      Palpations: Abdomen is soft.      Tenderness: There is abdominal tenderness (Mideipigastrium). There is right CVA tenderness and left CVA tenderness.      Comments: Suprapubic catheter   Musculoskeletal:         General: No deformity.      Right lower leg: Edema present.      Left lower leg: Edema present.   Skin:     General: Skin is warm.   Neurological:      General: No focal deficit present.      Mental Status: She is alert and oriented to person, place, and time. Mental status is at baseline.      Comments: Chronic right sided facial droop       Significant Labs: All pertinent labs within the past 24 hours have been reviewed.  BMP:   Recent Labs   Lab 04/21/22  0903 04/22/22  0339   * 86    142   K 4.2 4.1   CL 99 104   CO2 32* 30*   BUN 14 12   CREATININE 1.7* 1.2   CALCIUM 9.6 9.1   MG 2.1  --        Urine Culture: No results for input(s): LABURIN in the last 48 hours.    Significant Imaging: I have reviewed all pertinent imaging results/findings within the past 24 hours.      Assessment/Plan:      * CARITO (acute kidney injury)  · Creatinine 2.8 on admit, baseline around 1.1 - was 5.9 a week ago - 1.2 today  · Has been on Torsemide, but also with decreased PO intake  · Renal US without hydro  · Stop Torsemide for now  · Continue with gentle IVF boluses as Creatinine is improving    Staph aureus infection  · MSSA UTI associated with suprapubic catheter, meadows changed in the ER  · ID consulted:  Suggest 2 week course end date 5/2 - IV Ancef then PO Keflex on DC, but patient reports she wants IV abx on DC - will need to readdress with ID tomorrow    Abdominal pain  · Likely 2/2 UTI  · Lipase normal    Urinary tract infection associated with cystostomy catheter  · Chronic and recurrent UTIs  · Followed by ID and urology outpatient  · Meadows changed in the ER  · Management as above    Neurogenic bladder  · Chronic catheter  · Continue Ditropan 15mg PO daily    Class 1 obesity  due to excess calories in adult  Body mass index is 32.79 kg/m².  · Encourage diet, weight loss, exercise      Anxiety  · Chronic and stable  · Continue Trazodone and Seroquel    Bradycardia  · Chronic issue likely from Dilaudid pain pump  · HR 45s-55s, asymptomatic  · Continuous telemetry monitoring   · Keep Mag >2, K >4      Paresthesia of both lower extremities  · Trial low dose Gabapentin      Lymphedema of both lower extremities  · D dimer positive  · Check LE US to r/o DVT      Primary hypothyroidism  · Chronic and stable  · Continue Levothyroxine 125mcg po before breakfast      Frequent falls  · PT/OT eval and treat: HH at discharge  · Fall precautions  · Reinforced to patient to call for assistance      CHF (congestive heart failure)  - Denies shortness of breath  - Chronic BLE, bedrest  - CHF pathways  - monitor pulse ox    7/7/2021 TTE    · The left ventricle is normal in size with normal systolic function.  · The estimated ejection fraction is 55%.  · Normal left ventricular diastolic function.  · Mild tricuspid regurgitation.  · Normal right ventricular size with normal right ventricular systolic function.  · The estimated PA systolic pressure is 47 mmHg.  · There is pulmonary hypertension.        Chronic pain syndrome  · Chronic issue  · Has Dilaudid pain pump      Major depressive disorder, recurrent, mild  · Chronic and stable  · Continue SSRI    Sleep apnea  · CPAP every night        VTE Risk Mitigation (From admission, onward)         Ordered     heparin (porcine) injection 5,000 Units  Every 8 hours         04/20/22 1052                Discharge Planning   JACKIE: 4/23/2022     Code Status: Full Code   Is the patient medically ready for discharge?:     Reason for patient still in hospital (select all that apply): Patient trending condition  Discharge Plan A: Home Health, Other (IV abx)                  Rebecca Condon MD  Department of Hospital Medicine   [facility]

## 2022-04-22 NOTE — PLAN OF CARE
Problem: Physical Therapy  Goal: Physical Therapy Goal  Description: Goals to be met by: discharge date     Patient will increase functional independence with mobility by performin. Supine to sit with Modified [place]  2. Sit to supine with Modified [place]  3. Sit to stand transfer with Modified [place]  4. Bed to chair transfer with Modified [place] and Supervision using Rolling Walker and janeen OOB chair > 1 hr  5. Gait  x 100 feet with Stand-by Assistance using Rolling Walker.   6. Lower extremity exercise program x15 reps   Outcome: Ongoing, Progressing

## 2022-04-23 LAB
ANION GAP SERPL CALC-SCNC: 7 MMOL/L (ref 8–16)
BUN SERPL-MCNC: 10 MG/DL (ref 8–23)
CALCIUM SERPL-MCNC: 9 MG/DL (ref 8.7–10.5)
CHLORIDE SERPL-SCNC: 106 MMOL/L (ref 95–110)
CO2 SERPL-SCNC: 30 MMOL/L (ref 23–29)
CREAT SERPL-MCNC: 1.1 MG/DL (ref 0.5–1.4)
EST. GFR  (AFRICAN AMERICAN): >60 ML/MIN/1.73 M^2
EST. GFR  (NON AFRICAN AMERICAN): 53 ML/MIN/1.73 M^2
GLUCOSE SERPL-MCNC: 82 MG/DL (ref 70–110)
POCT GLUCOSE: 108 MG/DL (ref 70–110)
POCT GLUCOSE: 180 MG/DL (ref 70–110)
POTASSIUM SERPL-SCNC: 4.4 MMOL/L (ref 3.5–5.1)
SODIUM SERPL-SCNC: 143 MMOL/L (ref 136–145)

## 2022-04-23 PROCEDURE — 25000003 PHARM REV CODE 250: Performed by: HOSPITALIST

## 2022-04-23 PROCEDURE — 36415 COLL VENOUS BLD VENIPUNCTURE: CPT | Performed by: HOSPITALIST

## 2022-04-23 PROCEDURE — 99900035 HC TECH TIME PER 15 MIN (STAT)

## 2022-04-23 PROCEDURE — 63600175 PHARM REV CODE 636 W HCPCS: Performed by: HOSPITALIST

## 2022-04-23 PROCEDURE — 94761 N-INVAS EAR/PLS OXIMETRY MLT: CPT

## 2022-04-23 PROCEDURE — 80048 BASIC METABOLIC PNL TOTAL CA: CPT | Performed by: HOSPITALIST

## 2022-04-23 PROCEDURE — 25000003 PHARM REV CODE 250: Performed by: NURSE PRACTITIONER

## 2022-04-23 PROCEDURE — 11000001 HC ACUTE MED/SURG PRIVATE ROOM

## 2022-04-23 PROCEDURE — 94799 UNLISTED PULMONARY SVC/PX: CPT

## 2022-04-23 RX ADMIN — GABAPENTIN 100 MG: 100 CAPSULE ORAL at 09:04

## 2022-04-23 RX ADMIN — HEPARIN SODIUM 5000 UNITS: 5000 INJECTION INTRAVENOUS; SUBCUTANEOUS at 09:04

## 2022-04-23 RX ADMIN — TRAMADOL HYDROCHLORIDE 50 MG: 50 TABLET, COATED ORAL at 09:04

## 2022-04-23 RX ADMIN — POLYETHYLENE GLYCOL 3350 17 G: 17 POWDER, FOR SOLUTION ORAL at 09:04

## 2022-04-23 RX ADMIN — SENNOSIDES 2 TABLET: 8.6 TABLET, FILM COATED ORAL at 09:04

## 2022-04-23 RX ADMIN — ATORVASTATIN CALCIUM 80 MG: 40 TABLET, FILM COATED ORAL at 09:04

## 2022-04-23 RX ADMIN — OXYBUTYNIN CHLORIDE 15 MG: 5 TABLET, EXTENDED RELEASE ORAL at 09:04

## 2022-04-23 RX ADMIN — MUPIROCIN: 20 OINTMENT TOPICAL at 09:04

## 2022-04-23 RX ADMIN — TRAMADOL HYDROCHLORIDE 50 MG: 50 TABLET, COATED ORAL at 03:04

## 2022-04-23 RX ADMIN — PANTOPRAZOLE SODIUM 40 MG: 40 TABLET, DELAYED RELEASE ORAL at 09:04

## 2022-04-23 RX ADMIN — HEPARIN SODIUM 5000 UNITS: 5000 INJECTION INTRAVENOUS; SUBCUTANEOUS at 06:04

## 2022-04-23 RX ADMIN — FLUOXETINE HYDROCHLORIDE 60 MG: 20 CAPSULE ORAL at 09:04

## 2022-04-23 RX ADMIN — CEPHALEXIN 500 MG: 250 POWDER, FOR SUSPENSION ORAL at 09:04

## 2022-04-23 RX ADMIN — QUETIAPINE FUMARATE 200 MG: 100 TABLET ORAL at 09:04

## 2022-04-23 RX ADMIN — LIDOCAINE 1 PATCH: 50 PATCH CUTANEOUS at 03:04

## 2022-04-23 RX ADMIN — TRAZODONE HYDROCHLORIDE 300 MG: 100 TABLET ORAL at 09:04

## 2022-04-23 RX ADMIN — HEPARIN SODIUM 5000 UNITS: 5000 INJECTION INTRAVENOUS; SUBCUTANEOUS at 03:04

## 2022-04-23 RX ADMIN — LEVOTHYROXINE SODIUM 125 MCG: 0.03 TABLET ORAL at 06:04

## 2022-04-23 NOTE — PT/OT/SLP PROGRESS
Physical Therapy  Missed Visit    Patient Name:  Tasha Hawley   MRN:  [id number]    Patient not seen today secondary to Patient unwilling to participate. Pt  c/o pain in abdomen, back and legs. RN was notified and reports pt was given pain medication earlier. Will follow-up in am.  Avis Hou, PT  4/23/2022

## 2022-04-23 NOTE — PROGRESS NOTES
[facility] Medicine  Progress Note    Patient Name: Tasha Hawley  MRN: [id number]  Patient Class: IP- Inpatient   Admission Date: 4/14/2022  Length of Stay: 5 days  Attending Physician: Andrey Rivera,*  Primary Care Provider: Mesfin Hodges Ii, MD        Subjective:     Principal Problem:CARITO (acute kidney injury)        HPI:  Tasha Hawley is a 65 yo female with a past medical history of Depression, Anemia, Congestive heart failure, Renal disorder, Lumbar radiculopathy, Sleep apnea, Neurogenic bladder in which she has a suprapubic catheter, recurrent UTI, Chronic pain with epidural pain pump, Coronary artery disease, Hypothyroidism, and Sleep apnea. Her PCP is Dr. Mesfin Hodges. She is established with Dr. Karla Amezquita for Nephrology. Dr. Mayo is her Urologist. She has home health with [facility] . She presented to the ED at [facility] via EMS with a cc of generalized body aches for the past 3-4 days. Associated symptoms include poor oral intake, red and orange colored urine, back pain and nausea. She also reports constipation. She denies fever, diarrhea, headaches, or blurred vision.     ED workup revealed WBC UA 78, UA Nitrate +, UA Leukocytes 3+, Hgb 9.6, Hct 29.8, K 3.0, CO2 33, Cr 2.8, Albumin 3.4, ALT 9, Drug screen + Opiate, EKG SB, rate 47, CXR Chronic appearing interstitial findings, no large focal consolidation. IV Rocephin initiated. Urology consulted. Admitted to Hospital medicine for further evaluation and treatment.       Overview/Hospital Course:  Ms. Hawley was admitted to Hospital Medicine for management of a UTI.  She was started on Ceftriaxone.  Urology was consulted.  She was found to have MSSA and was changed to IV Ancef.  ID suggested 2 weeks for a complicated UTI, end date 5/2 - with plans to switch to PO Keflex on DC.  She continued to suffer from CARITO, hypotension, and bradycardia.  2D echo was ordered which was normal.  Her Torsemide was held.  She was  given small IVF boluses with improvement in her BP and Creatinine. HR continued to remain low, which was thought to be 2/2 her Dilaudid pain pump.  BP and Creatinine eventually improved.      Interval History: No acute events overnight.  Kidney function improving.  Discussed potentially to send the patient home on today, however she a complaints of significant leg pain.  Doppler lower extremity evaluation unremarkable    Review of Systems   Constitutional:  Negative for chills, fatigue and fever.   Respiratory:  Negative for cough and shortness of breath.    Cardiovascular:  Negative for chest pain, palpitations and leg swelling.   Gastrointestinal:  Positive for abdominal pain and nausea. Negative for diarrhea and vomiting.   Genitourinary:  Negative for dysuria and urgency.   Neurological:  Negative for dizziness and headaches.   All other systems reviewed and are negative.  Objective:     Vital Signs (Most Recent):  Temp: 96.9 °F (36.1 °C) (04/23/22 1251)  Pulse: 67 (04/23/22 1251)  Resp: 18 (04/23/22 1251)  BP: (!) 105/57 (04/23/22 1251)  SpO2: (!) 93 % (04/23/22 1251)   Vital Signs (24h Range):  Temp:  [96.1 °F (35.6 °C)-97.7 °F (36.5 °C)] 96.9 °F (36.1 °C)  Pulse:  [47-67] 67  Resp:  [14-20] 18  SpO2:  [92 %-96 %] 93 %  BP: ()/(44-89) 105/57     Weight: 86.6 kg (191 lb)  Body mass index is 32.79 kg/m².    Intake/Output Summary (Last 24 hours) at 4/23/2022 1405  Last data filed at 4/22/2022 2000  Gross per 24 hour   Intake 1047.78 ml   Output --   Net 1047.78 ml        Physical Exam  Constitutional:       Appearance: Normal appearance. She is well-developed. She is obese.   HENT:      Head: Normocephalic and atraumatic.   Cardiovascular:      Rate and Rhythm: Regular rhythm. Bradycardia present.      Heart sounds: No murmur heard.  Pulmonary:      Effort: Pulmonary effort is normal. No respiratory distress.      Breath sounds: Normal breath sounds. No wheezing or rales.   Abdominal:      General: There is  no distension.      Palpations: Abdomen is soft.      Tenderness: There is abdominal tenderness (Mideipigastrium). There is right CVA tenderness and left CVA tenderness.      Comments: Suprapubic catheter   Musculoskeletal:         General: No deformity.      Right lower leg: Edema present.      Left lower leg: Edema present.   Skin:     General: Skin is warm.   Neurological:      General: No focal deficit present.      Mental Status: She is alert and oriented to person, place, and time. Mental status is at baseline.      Comments: Chronic right sided facial droop       Significant Labs: All pertinent labs within the past 24 hours have been reviewed.  BMP:   Recent Labs   Lab 04/23/22  0522   GLU 82      K 4.4      CO2 30*   BUN 10   CREATININE 1.1   CALCIUM 9.0       Urine Culture: No results for input(s): LABURIN in the last 48 hours.    Significant Imaging: I have reviewed all pertinent imaging results/findings within the past 24 hours.      Assessment/Plan:      * CARITO (acute kidney injury)  · Creatinine 2.8 on admit, baseline around 1.1 - was 5.9 a week ago - 1.2 today  · Has been on Torsemide, but also with decreased PO intake  · Renal US without hydro  · Stop Torsemide for now  · Continue with gentle IVF boluses as Creatinine is improving    Class 1 obesity due to excess calories in adult  Body mass index is 32.79 kg/m².  · Encourage diet, weight loss, exercise      Staph aureus infection  · MSSA UTI associated with suprapubic catheter, meadows changed in the ER  · ID consulted:  Suggest 2 week course end date 5/2 - IV Ancef then PO Keflex on DC, but patient reports she wants IV abx on DC - will need to readdress with ID tomorrow    Abdominal pain  · Likely 2/2 UTI  · Lipase normal    Anxiety  · Chronic and stable  · Continue Trazodone and Seroquel    Urinary tract infection associated with cystostomy catheter  · Chronic and recurrent UTIs  · Followed by ID and urology outpatient  · Meadows changed in  the ER  · Management as above    Bradycardia  · Chronic issue likely from Dilaudid pain pump  · HR 45s-55s, asymptomatic  · Continuous telemetry monitoring   · Keep Mag >2, K >4      Paresthesia of both lower extremities  · Trial low dose Gabapentin      Lymphedema of both lower extremities  · D dimer positive  · Check LE US to r/o DVT      Primary hypothyroidism  · Chronic and stable  · Continue Levothyroxine 125mcg po before breakfast      Frequent falls  · PT/OT eval and treat: HH at discharge  · Fall precautions  · Reinforced to patient to call for assistance      Neurogenic bladder  · Chronic catheter  · Continue Ditropan 15mg PO daily    CHF (congestive heart failure)  - Denies shortness of breath  - Chronic BLE, bedrest  - CHF pathways  - monitor pulse ox    7/7/2021 TTE    · The left ventricle is normal in size with normal systolic function.  · The estimated ejection fraction is 55%.  · Normal left ventricular diastolic function.  · Mild tricuspid regurgitation.  · Normal right ventricular size with normal right ventricular systolic function.  · The estimated PA systolic pressure is 47 mmHg.  · There is pulmonary hypertension.        Chronic pain syndrome  · Chronic issue  · Has Dilaudid pain pump      Major depressive disorder, recurrent, mild  · Chronic and stable  · Continue SSRI    Sleep apnea  · CPAP every night        VTE Risk Mitigation (From admission, onward)         Ordered     heparin (porcine) injection 5,000 Units  Every 8 hours         04/20/22 1052                Discharge Planning   JACKIE: 4/23/2022     Code Status: Full Code   Is the patient medically ready for discharge?:     Reason for patient still in hospital (select all that apply): Patient trending condition  Discharge Plan A: Home Health, Other (IV abx)                  Andrey Rivera MD  Department of Hospital Medicine   [facility]

## 2022-04-23 NOTE — SUBJECTIVE & OBJECTIVE
Interval History: No acute events overnight.  Kidney function improving.  Discussed potentially to send the patient home on today, however she a complaints of significant leg pain.  Doppler lower extremity evaluation unremarkable    Review of Systems   Constitutional:  Negative for chills, fatigue and fever.   Respiratory:  Negative for cough and shortness of breath.    Cardiovascular:  Negative for chest pain, palpitations and leg swelling.   Gastrointestinal:  Positive for abdominal pain and nausea. Negative for diarrhea and vomiting.   Genitourinary:  Negative for dysuria and urgency.   Neurological:  Negative for dizziness and headaches.   All other systems reviewed and are negative.  Objective:     Vital Signs (Most Recent):  Temp: 96.9 °F (36.1 °C) (04/23/22 1251)  Pulse: 67 (04/23/22 1251)  Resp: 18 (04/23/22 1251)  BP: (!) 105/57 (04/23/22 1251)  SpO2: (!) 93 % (04/23/22 1251)   Vital Signs (24h Range):  Temp:  [96.1 °F (35.6 °C)-97.7 °F (36.5 °C)] 96.9 °F (36.1 °C)  Pulse:  [47-67] 67  Resp:  [14-20] 18  SpO2:  [92 %-96 %] 93 %  BP: ()/(44-89) 105/57     Weight: 86.6 kg (191 lb)  Body mass index is 32.79 kg/m².    Intake/Output Summary (Last 24 hours) at 4/23/2022 1405  Last data filed at 4/22/2022 2000  Gross per 24 hour   Intake 1047.78 ml   Output --   Net 1047.78 ml        Physical Exam  Constitutional:       Appearance: Normal appearance. She is well-developed. She is obese.   HENT:      Head: Normocephalic and atraumatic.   Cardiovascular:      Rate and Rhythm: Regular rhythm. Bradycardia present.      Heart sounds: No murmur heard.  Pulmonary:      Effort: Pulmonary effort is normal. No respiratory distress.      Breath sounds: Normal breath sounds. No wheezing or rales.   Abdominal:      General: There is no distension.      Palpations: Abdomen is soft.      Tenderness: There is abdominal tenderness (Mideipigastrium). There is right CVA tenderness and left CVA tenderness.      Comments:  Suprapubic catheter   Musculoskeletal:         General: No deformity.      Right lower leg: Edema present.      Left lower leg: Edema present.   Skin:     General: Skin is warm.   Neurological:      General: No focal deficit present.      Mental Status: She is alert and oriented to person, place, and time. Mental status is at baseline.      Comments: Chronic right sided facial droop       Significant Labs: All pertinent labs within the past 24 hours have been reviewed.  BMP:   Recent Labs   Lab 04/23/22  0522   GLU 82      K 4.4      CO2 30*   BUN 10   CREATININE 1.1   CALCIUM 9.0       Urine Culture: No results for input(s): LABURIN in the last 48 hours.    Significant Imaging: I have reviewed all pertinent imaging results/findings within the past 24 hours.

## 2022-04-24 LAB
ANION GAP SERPL CALC-SCNC: 8 MMOL/L (ref 8–16)
BUN SERPL-MCNC: 10 MG/DL (ref 8–23)
CALCIUM SERPL-MCNC: 8.9 MG/DL (ref 8.7–10.5)
CHLORIDE SERPL-SCNC: 105 MMOL/L (ref 95–110)
CO2 SERPL-SCNC: 28 MMOL/L (ref 23–29)
CREAT SERPL-MCNC: 1.2 MG/DL (ref 0.5–1.4)
EST. GFR  (AFRICAN AMERICAN): 55 ML/MIN/1.73 M^2
EST. GFR  (NON AFRICAN AMERICAN): 48 ML/MIN/1.73 M^2
GLUCOSE SERPL-MCNC: 97 MG/DL (ref 70–110)
POTASSIUM SERPL-SCNC: 4.7 MMOL/L (ref 3.5–5.1)
SODIUM SERPL-SCNC: 141 MMOL/L (ref 136–145)

## 2022-04-24 PROCEDURE — 80048 BASIC METABOLIC PNL TOTAL CA: CPT | Performed by: HOSPITALIST

## 2022-04-24 PROCEDURE — 25000003 PHARM REV CODE 250: Performed by: HOSPITALIST

## 2022-04-24 PROCEDURE — 97116 GAIT TRAINING THERAPY: CPT | Performed by: PHYSICAL THERAPIST

## 2022-04-24 PROCEDURE — 99900035 HC TECH TIME PER 15 MIN (STAT)

## 2022-04-24 PROCEDURE — 94761 N-INVAS EAR/PLS OXIMETRY MLT: CPT

## 2022-04-24 PROCEDURE — 63600175 PHARM REV CODE 636 W HCPCS: Performed by: HOSPITALIST

## 2022-04-24 PROCEDURE — 36415 COLL VENOUS BLD VENIPUNCTURE: CPT | Performed by: HOSPITALIST

## 2022-04-24 PROCEDURE — 94799 UNLISTED PULMONARY SVC/PX: CPT

## 2022-04-24 PROCEDURE — 25000003 PHARM REV CODE 250: Performed by: NURSE PRACTITIONER

## 2022-04-24 PROCEDURE — 11000001 HC ACUTE MED/SURG PRIVATE ROOM

## 2022-04-24 PROCEDURE — 97530 THERAPEUTIC ACTIVITIES: CPT | Performed by: PHYSICAL THERAPIST

## 2022-04-24 RX ADMIN — LEVOTHYROXINE SODIUM 125 MCG: 0.03 TABLET ORAL at 06:04

## 2022-04-24 RX ADMIN — TRAZODONE HYDROCHLORIDE 300 MG: 100 TABLET ORAL at 10:04

## 2022-04-24 RX ADMIN — CEPHALEXIN 500 MG: 250 POWDER, FOR SUSPENSION ORAL at 10:04

## 2022-04-24 RX ADMIN — ATORVASTATIN CALCIUM 80 MG: 40 TABLET, FILM COATED ORAL at 11:04

## 2022-04-24 RX ADMIN — SENNOSIDES 2 TABLET: 8.6 TABLET, FILM COATED ORAL at 10:04

## 2022-04-24 RX ADMIN — OXYCODONE 5 MG: 5 TABLET ORAL at 11:04

## 2022-04-24 RX ADMIN — HEPARIN SODIUM 5000 UNITS: 5000 INJECTION INTRAVENOUS; SUBCUTANEOUS at 04:04

## 2022-04-24 RX ADMIN — OXYCODONE 5 MG: 5 TABLET ORAL at 05:04

## 2022-04-24 RX ADMIN — OXYCODONE 5 MG: 5 TABLET ORAL at 10:04

## 2022-04-24 RX ADMIN — SENNOSIDES 2 TABLET: 8.6 TABLET, FILM COATED ORAL at 11:04

## 2022-04-24 RX ADMIN — HEPARIN SODIUM 5000 UNITS: 5000 INJECTION INTRAVENOUS; SUBCUTANEOUS at 06:04

## 2022-04-24 RX ADMIN — LIDOCAINE 1 PATCH: 50 PATCH CUTANEOUS at 04:04

## 2022-04-24 RX ADMIN — QUETIAPINE FUMARATE 200 MG: 100 TABLET ORAL at 10:04

## 2022-04-24 RX ADMIN — OXYBUTYNIN CHLORIDE 15 MG: 5 TABLET, EXTENDED RELEASE ORAL at 11:04

## 2022-04-24 RX ADMIN — HEPARIN SODIUM 5000 UNITS: 5000 INJECTION INTRAVENOUS; SUBCUTANEOUS at 10:04

## 2022-04-24 RX ADMIN — FLUOXETINE HYDROCHLORIDE 60 MG: 20 CAPSULE ORAL at 11:04

## 2022-04-24 RX ADMIN — MUPIROCIN: 20 OINTMENT TOPICAL at 11:04

## 2022-04-24 RX ADMIN — PANTOPRAZOLE SODIUM 40 MG: 40 TABLET, DELAYED RELEASE ORAL at 11:04

## 2022-04-24 RX ADMIN — PROMETHAZINE HYDROCHLORIDE 12.5 MG: 6.25 SYRUP ORAL at 11:04

## 2022-04-24 RX ADMIN — TRAMADOL HYDROCHLORIDE 50 MG: 50 TABLET, COATED ORAL at 01:04

## 2022-04-24 RX ADMIN — TRAMADOL HYDROCHLORIDE 50 MG: 50 TABLET, COATED ORAL at 04:04

## 2022-04-24 RX ADMIN — GABAPENTIN 100 MG: 100 CAPSULE ORAL at 10:04

## 2022-04-24 NOTE — PROGRESS NOTES
[facility] Medicine  Progress Note    Patient Name: Tasha Hawley  MRN: [id number]  Patient Class: IP- Inpatient   Admission Date: 4/14/2022  Length of Stay: 6 days  Attending Physician: Andrey Rivera,*  Primary Care Provider: Mesfin Hodges Ii, MD        Subjective:     Principal Problem:CARITO (acute kidney injury)        HPI:  Tasha Hawley is a 67 yo female with a past medical history of Depression, Anemia, Congestive heart failure, Renal disorder, Lumbar radiculopathy, Sleep apnea, Neurogenic bladder in which she has a suprapubic catheter, recurrent UTI, Chronic pain with epidural pain pump, Coronary artery disease, Hypothyroidism, and Sleep apnea. Her PCP is Dr. Mesfin Hodges. She is established with Dr. Karla Amezquita for Nephrology. Dr. Mayo is her Urologist. She has home health with [facility] . She presented to the ED at [facility] via EMS with a cc of generalized body aches for the past 3-4 days. Associated symptoms include poor oral intake, red and orange colored urine, back pain and nausea. She also reports constipation. She denies fever, diarrhea, headaches, or blurred vision.     ED workup revealed WBC UA 78, UA Nitrate +, UA Leukocytes 3+, Hgb 9.6, Hct 29.8, K 3.0, CO2 33, Cr 2.8, Albumin 3.4, ALT 9, Drug screen + Opiate, EKG SB, rate 47, CXR Chronic appearing interstitial findings, no large focal consolidation. IV Rocephin initiated. Urology consulted. Admitted to Hospital medicine for further evaluation and treatment.       Overview/Hospital Course:  Ms. Hawley was admitted to Hospital Medicine for management of a UTI.  She was started on Ceftriaxone.  Urology was consulted.  She was found to have MSSA and was changed to IV Ancef.  ID suggested 2 weeks for a complicated UTI, end date 5/2 - with plans to switch to PO Keflex on DC.  She continued to suffer from CARITO, hypotension, and bradycardia.  2D echo was ordered which was normal.  Her Torsemide was held.  She was  given small IVF boluses with improvement in her BP and Creatinine. HR continued to remain low, which was thought to be 2/2 her Dilaudid pain pump.  BP and Creatinine eventually improved.      Interval History: Interval History:  Patient seems to be feeling better today.  Kidney function stable.  Blood pressure low normal.  Feels like she should be able to go home tomorrow.  Review of Systems   Constitutional:  Negative for chills, fatigue and fever.   Respiratory:  Negative for cough and shortness of breath.    Cardiovascular:  Negative for chest pain, palpitations and leg swelling.   Gastrointestinal:  Positive for abdominal pain and nausea. Negative for diarrhea and vomiting.   Genitourinary:  Negative for dysuria and urgency.   Neurological:  Negative for dizziness and headaches.   All other systems reviewed and are negative.  Objective:     Vital Signs (Most Recent):  Temp: 96.9 °F (36.1 °C) (04/23/22 1251)  Pulse: 67 (04/23/22 1251)  Resp: 18 (04/23/22 1251)  BP: (!) 105/57 (04/23/22 1251)  SpO2: (!) 93 % (04/23/22 1251)   Vital Signs (24h Range):  Temp:  [96.1 °F (35.6 °C)-97.7 °F (36.5 °C)] 96.9 °F (36.1 °C)  Pulse:  [47-67] 67  Resp:  [14-20] 18  SpO2:  [92 %-96 %] 93 %  BP: ()/(44-89) 105/57     Weight: 86.6 kg (191 lb)  Body mass index is 32.79 kg/m².    Intake/Output Summary (Last 24 hours) at 4/23/2022 1405  Last data filed at 4/22/2022 2000  Gross per 24 hour   Intake 1047.78 ml   Output --   Net 1047.78 ml        Physical Exam  Constitutional:       Appearance: Normal appearance. She is well-developed. She is obese.   HENT:      Head: Normocephalic and atraumatic.   Cardiovascular:      Rate and Rhythm: Regular rhythm. Bradycardia present.      Heart sounds: No murmur heard.  Pulmonary:      Effort: Pulmonary effort is normal. No respiratory distress.      Breath sounds: Normal breath sounds. No wheezing or rales.   Abdominal:      General: There is no distension.      Palpations: Abdomen is  soft.      Tenderness: There is abdominal tenderness (Mideipigastrium). There is right CVA tenderness and left CVA tenderness.      Comments: Suprapubic catheter   Musculoskeletal:         General: No deformity.      Right lower leg: Edema present.      Left lower leg: Edema present.   Skin:     General: Skin is warm.   Neurological:      General: No focal deficit present.      Mental Status: She is alert and oriented to person, place, and time. Mental status is at baseline.      Comments: Chronic right sided facial droop       Significant Labs: All pertinent labs within the past 24 hours have been reviewed.  BMP:   Recent Labs   Lab 04/23/22  0522   GLU 82      K 4.4      CO2 30*   BUN 10   CREATININE 1.1   CALCIUM 9.0       Urine Culture: No results for input(s): LABURIN in the last 48 hours.    Significant Imaging: I have reviewed all pertinent imaging results/findings within the past 24 hours.      Assessment/Plan:      * CARITO (acute kidney injury)  · Creatinine 2.8 on admit, baseline around 1.1 - was 5.9 a week ago - 1.2 today  · Has been on Torsemide, but also with decreased PO intake  · Renal US without hydro  · Stop Torsemide for now  · Continue with gentle IVF boluses as Creatinine is improving    Class 1 obesity due to excess calories in adult  Body mass index is 32.79 kg/m².  · Encourage diet, weight loss, exercise      Staph aureus infection  · MSSA UTI associated with suprapubic catheter, meadows changed in the ER  · ID consulted:  Suggest 2 week course end date 5/2 - IV Ancef then PO Keflex on DC, but patient reports she wants IV abx on DC - will need to readdress with ID tomorrow    Abdominal pain  · Likely 2/2 UTI  · Lipase normal    Anxiety  · Chronic and stable  · Continue Trazodone and Seroquel    Urinary tract infection associated with cystostomy catheter  · Chronic and recurrent UTIs  · Followed by ID and urology outpatient  · Meadows changed in the ER  · Management as  above    Bradycardia  · Chronic issue likely from Dilaudid pain pump  · HR 45s-55s, asymptomatic  · Continuous telemetry monitoring   · Keep Mag >2, K >4      Paresthesia of both lower extremities  · Trial low dose Gabapentin      Lymphedema of both lower extremities  · D dimer positive  · Check LE US to r/o DVT      Primary hypothyroidism  · Chronic and stable  · Continue Levothyroxine 125mcg po before breakfast      Frequent falls  · PT/OT eval and treat: HH at discharge  · Fall precautions  · Reinforced to patient to call for assistance      Neurogenic bladder  · Chronic catheter  · Continue Ditropan 15mg PO daily    CHF (congestive heart failure)  - Denies shortness of breath  - Chronic BLE, bedrest  - CHF pathways  - monitor pulse ox    7/7/2021 TTE    · The left ventricle is normal in size with normal systolic function.  · The estimated ejection fraction is 55%.  · Normal left ventricular diastolic function.  · Mild tricuspid regurgitation.  · Normal right ventricular size with normal right ventricular systolic function.  · The estimated PA systolic pressure is 47 mmHg.  · There is pulmonary hypertension.        Chronic pain syndrome  · Chronic issue  · Has Dilaudid pain pump      Major depressive disorder, recurrent, mild  · Chronic and stable  · Continue SSRI    Sleep apnea  · CPAP every night        VTE Risk Mitigation (From admission, onward)         Ordered     heparin (porcine) injection 5,000 Units  Every 8 hours         04/20/22 1052                Discharge Planning   JACKIE: 4/23/2022     Code Status: Full Code   Is the patient medically ready for discharge?:     Reason for patient still in hospital (select all that apply): Patient trending condition  Discharge Plan A: Home Health, Other (IV abx)                  Andrey Rivera MD  Department of Hospital Medicine   [place] [facility]

## 2022-04-24 NOTE — SUBJECTIVE & OBJECTIVE
Interval History: Interval History:  Patient seems to be feeling better today.  Kidney function stable.  Blood pressure low normal.  Feels like she should be able to go home tomorrow.  Review of Systems   Constitutional:  Negative for chills, fatigue and fever.   Respiratory:  Negative for cough and shortness of breath.    Cardiovascular:  Negative for chest pain, palpitations and leg swelling.   Gastrointestinal:  Positive for abdominal pain and nausea. Negative for diarrhea and vomiting.   Genitourinary:  Negative for dysuria and urgency.   Neurological:  Negative for dizziness and headaches.   All other systems reviewed and are negative.  Objective:     Vital Signs (Most Recent):  Temp: 96.9 °F (36.1 °C) (04/23/22 1251)  Pulse: 67 (04/23/22 1251)  Resp: 18 (04/23/22 1251)  BP: (!) 105/57 (04/23/22 1251)  SpO2: (!) 93 % (04/23/22 1251)   Vital Signs (24h Range):  Temp:  [96.1 °F (35.6 °C)-97.7 °F (36.5 °C)] 96.9 °F (36.1 °C)  Pulse:  [47-67] 67  Resp:  [14-20] 18  SpO2:  [92 %-96 %] 93 %  BP: ()/(44-89) 105/57     Weight: 86.6 kg (191 lb)  Body mass index is 32.79 kg/m².    Intake/Output Summary (Last 24 hours) at 4/23/2022 1405  Last data filed at 4/22/2022 2000  Gross per 24 hour   Intake 1047.78 ml   Output --   Net 1047.78 ml        Physical Exam  Constitutional:       Appearance: Normal appearance. She is well-developed. She is obese.   HENT:      Head: Normocephalic and atraumatic.   Cardiovascular:      Rate and Rhythm: Regular rhythm. Bradycardia present.      Heart sounds: No murmur heard.  Pulmonary:      Effort: Pulmonary effort is normal. No respiratory distress.      Breath sounds: Normal breath sounds. No wheezing or rales.   Abdominal:      General: There is no distension.      Palpations: Abdomen is soft.      Tenderness: There is abdominal tenderness (Mideipigastrium). There is right CVA tenderness and left CVA tenderness.      Comments: Suprapubic catheter   Musculoskeletal:          General: No deformity.      Right lower leg: Edema present.      Left lower leg: Edema present.   Skin:     General: Skin is warm.   Neurological:      General: No focal deficit present.      Mental Status: She is alert and oriented to person, place, and time. Mental status is at baseline.      Comments: Chronic right sided facial droop       Significant Labs: All pertinent labs within the past 24 hours have been reviewed.  BMP:   Recent Labs   Lab 04/23/22  0522   GLU 82      K 4.4      CO2 30*   BUN 10   CREATININE 1.1   CALCIUM 9.0       Urine Culture: No results for input(s): LABURIN in the last 48 hours.    Significant Imaging: I have reviewed all pertinent imaging results/findings within the past 24 hours.

## 2022-04-24 NOTE — PLAN OF CARE
Problem: Physical Therapy  Goal: Physical Therapy Goal  Description: Goals to be met by: discharge date     Patient will increase functional independence with mobility by performin. Supine to sit with Modified [place]  2. Sit to supine with Modified [place]  3. Sit to stand transfer with Modified [place]  4. Bed to chair transfer with Modified [place] and Supervision using Rolling Walker and janeen OOB chair > 1 hr  5. Gait  x 100 feet with Stand-by Assistance using Rolling Walker.   6. Lower extremity exercise program x15 reps   Outcome: Ongoing, Progressing

## 2022-04-24 NOTE — PT/OT/SLP PROGRESS
Physical Therapy Treatment    Patient Name:  Tasha Hawley   MRN:  [id number]    Recommendations:     Discharge Recommendations:  home health PT   Discharge Equipment Recommendations: none   Barriers to discharge: decreased functional mobility tolerance    Assessment:     Tasha Hawley is a 66 y.o. female admitted with a medical diagnosis of CARITO (acute kidney injury).  She presents with the following impairments/functional limitations:  weakness, impaired functional mobilty, impaired cardiopulmonary response to activity, impaired endurance, impaired balance, impaired self care skills, decreased lower extremity function. Pt supine to sit with CG.  Pt ambulated 15' x 1 and 80' x 1 with RW with Min assist/CG with vc to walk inside RW and attempt more upright posture.  Pt went sit to supine with CG/sup.  Mobility was limited by c/o nausea and LBP.    Rehab Prognosis: Good; patient would benefit from acute skilled PT services to address these deficits and reach maximum level of function.    Recent Surgery: * No surgery found *      Plan:     During this hospitalization, patient to be seen 5 x/week to address the identified rehab impairments via gait training, therapeutic activities, therapeutic exercises, neuromuscular re-education and progress toward the following goals:    · Plan of Care Expires:  05/22/22    Subjective     Chief Complaint: nausea and LBP  Patient/Family Comments/goals: decrease nausea  Pain/Comfort:  · Pain Rating 1: 0/10 (Pt c/o LBP throughout, not rated)  · Pain Addressed 1: Nurse notified, Cessation of Activity, Distraction, Reposition      Objective:     Communicated with nurse prior to session.  Patient found HOB elevated with peripheral IV, telemetry, meadows catheter upon PT entry to room.     General Precautions: Standard, fall, hearing impaired   Orthopedic Precautions:N/A   Braces:    Respiratory Status: Room air     Functional Mobility:  Pt supine to sit with CG.  Pt ambulated 15' x 1  and 80' x 1 with RW with Min assist/CG with vc to walk inside RW and attempt more upright posture.  Pt went sit to supine with CG/sup.  Mobility was limited by c/o nausea and LBP.      AM-PAC 6 CLICK MOBILITY  Turning over in bed (including adjusting bedclothes, sheets and blankets)?: 3  Sitting down on and standing up from a chair with arms (e.g., wheelchair, bedside commode, etc.): 3  Moving from lying on back to sitting on the side of the bed?: 3  Moving to and from a bed to a chair (including a wheelchair)?: 3  Need to walk in hospital room?: 3  Climbing 3-5 steps with a railing?: 3  Basic Mobility Total Score: 18       Therapeutic Activities and Exercises:   Pt sat in chair/EOB 8 minutes taking/waiting for meds.    Patient left HOB elevated with all lines intact, call button in reach, bed alarm on and nurse notified..    GOALS:   Multidisciplinary Problems     Physical Therapy Goals        Problem: Physical Therapy    Goal Priority Disciplines Outcome Goal Variances Interventions   Physical Therapy Goal     PT, PT/OT Ongoing, Progressing     Description: Goals to be met by: discharge date     Patient will increase functional independence with mobility by performin. Supine to sit with Modified [place]  2. Sit to supine with Modified [place]  3. Sit to stand transfer with Modified [place]  4. Bed to chair transfer with Modified [place] and Supervision using Rolling Walker and janeen OOB chair > 1 hr  5. Gait  x 100 feet with Stand-by Assistance using Rolling Walker.   6. Lower extremity exercise program x15 reps                    Time Tracking:     PT Received On: 22  PT Start Time: 1118     PT Stop Time: 1141  PT Total Time (min): 23 min     Billable Minutes: Gait Training 14 and Therapeutic Activity 9    Treatment Type: Treatment  PT/PTA: PT     PTA Visit Number: 0     2022

## 2022-04-25 VITALS
WEIGHT: 191 LBS | HEART RATE: 54 BPM | HEIGHT: 64 IN | SYSTOLIC BLOOD PRESSURE: 96 MMHG | OXYGEN SATURATION: 96 % | TEMPERATURE: 97 F | DIASTOLIC BLOOD PRESSURE: 56 MMHG | BODY MASS INDEX: 32.61 KG/M2 | RESPIRATION RATE: 20 BRPM

## 2022-04-25 PROCEDURE — 25000003 PHARM REV CODE 250: Performed by: HOSPITALIST

## 2022-04-25 PROCEDURE — 63600175 PHARM REV CODE 636 W HCPCS: Performed by: HOSPITALIST

## 2022-04-25 PROCEDURE — 99900035 HC TECH TIME PER 15 MIN (STAT)

## 2022-04-25 PROCEDURE — 25000003 PHARM REV CODE 250: Performed by: NURSE PRACTITIONER

## 2022-04-25 PROCEDURE — 97110 THERAPEUTIC EXERCISES: CPT | Mod: CQ

## 2022-04-25 PROCEDURE — 97530 THERAPEUTIC ACTIVITIES: CPT | Mod: CQ

## 2022-04-25 RX ADMIN — HEPARIN SODIUM 5000 UNITS: 5000 INJECTION INTRAVENOUS; SUBCUTANEOUS at 06:04

## 2022-04-25 RX ADMIN — FLUOXETINE HYDROCHLORIDE 60 MG: 20 CAPSULE ORAL at 09:04

## 2022-04-25 RX ADMIN — SENNOSIDES 2 TABLET: 8.6 TABLET, FILM COATED ORAL at 09:04

## 2022-04-25 RX ADMIN — OXYBUTYNIN CHLORIDE 15 MG: 5 TABLET, EXTENDED RELEASE ORAL at 09:04

## 2022-04-25 RX ADMIN — PANTOPRAZOLE SODIUM 40 MG: 40 TABLET, DELAYED RELEASE ORAL at 09:04

## 2022-04-25 RX ADMIN — OXYCODONE 5 MG: 5 TABLET ORAL at 06:04

## 2022-04-25 RX ADMIN — CEPHALEXIN 500 MG: 250 POWDER, FOR SUSPENSION ORAL at 12:04

## 2022-04-25 RX ADMIN — OXYCODONE 5 MG: 5 TABLET ORAL at 12:04

## 2022-04-25 RX ADMIN — ATORVASTATIN CALCIUM 80 MG: 40 TABLET, FILM COATED ORAL at 09:04

## 2022-04-25 RX ADMIN — LEVOTHYROXINE SODIUM 125 MCG: 0.03 TABLET ORAL at 06:04

## 2022-04-25 NOTE — PLAN OF CARE
[facility] - Telemetry      HOME HEALTH ORDERS  FACE TO FACE ENCOUNTER    Patient Name: Tasha Hawley  YOB: 1955    PCP: Mesfin Hodges Ii, MD   PCP Address: [address] / [place] [address]  PCP Phone Number: [phone]  PCP Fax: [phone]    Encounter Date: 4/14/22    Admit to Home Health    Diagnoses:  Active Hospital Problems    Diagnosis  POA    *CARITO (acute kidney injury) [N17.9]  Yes    Class 1 obesity due to excess calories in adult [E66.09]  Yes    Staph aureus infection [A49.01]  Yes    Abdominal pain [R10.9]  Yes    Anxiety [F41.9]  Yes    Urinary tract infection associated with cystostomy catheter [T83.510A, N39.0]  Yes    Bradycardia [R00.1]  Yes    Paresthesia of both lower extremities [R20.2]  Yes    Lymphedema of both lower extremities [I89.0]  Yes     Chronic    Primary hypothyroidism [E03.9]  Yes     Chronic    Frequent falls [R29.6]  Not Applicable    Neurogenic bladder [N31.9]  Yes     Chronic    Chronic pain syndrome [G89.4]  Yes     Chronic    Major depressive disorder, recurrent, mild [F33.0]  Yes    Sleep apnea [G47.30]  Yes      Resolved Hospital Problems   No resolved problems to display.       Follow Up Appointments:  Future Appointments   Date Time Provider Department Center   5/2/2022 11:00 AM Shireen Mayo MD [facility] UROLOGY [facility]   5/3/2022  9:20 AM Mesfin Hodges II, MD [facility] IM [facility] PC[facility]   5/19/2022  8:30 AM René Gomez MD [facility] ID [facility]       Allergies:  Review of patient's allergies indicates:   Allergen Reactions    (d)-limonene flavor      Other reaction(s): difficult intubation  Other reaction(s): Difficulty breathing    Bactrim [sulfamethoxazole-trimethoprim] Anaphylaxis    Benadryl [diphenhydramine hcl] Shortness Of Breath    Fentanyl Itching, Nausea And Vomiting and Swelling             Imitrex [sumatriptan succinate] Shortness Of Breath    Topamax [topiramate] Shortness Of Breath    Vancomycin  Shortness Of Breath     Rash    Butorphanol tartrate     Darvocet a500 [propoxyphene n-acetaminophen]      Other reaction(s): Difficulty breathing    White petrolatum-zinc     Zinc oxide-white petrolatum      Other reaction(s): Difficulty breathing    Latex, natural rubber Itching and Rash    Phenytoin Rash and Other (See Comments)     Trouble breathing       Medications: Review discharge medications with patient and family and provide education.    Current Facility-Administered Medications   Medication Dose Route Frequency Provider Last Rate Last Admin    acetaminophen tablet 650 mg  650 mg Oral Q6H PRN Nic Mistry MD   650 mg at 04/20/22 0841    albuterol-ipratropium 2.5 mg-0.5 mg/3 mL nebulizer solution 3 mL  3 mL Nebulization Q6H PRN Nicolette Rubio NP   3 mL at 04/20/22 2220    atorvastatin tablet 80 mg  80 mg Oral Daily Stephany Shah NP   80 mg at 04/25/22 0923    cephALEXin 250 mg/5 mL suspension 500 mg  500 mg Oral Q12H Rebecca Condon MD   500 mg at 04/24/22 2221    FLUoxetine capsule 60 mg  60 mg Oral Daily Nic Mistry MD   60 mg at 04/25/22 0923    gabapentin capsule 100 mg  100 mg Oral QHS Nic Mistry MD   100 mg at 04/24/22 2221    guaiFENesin 100 mg/5 ml syrup 200 mg  200 mg Oral Q4H PRN Nicolette Rubio NP   200 mg at 04/20/22 2318    heparin (porcine) injection 5,000 Units  5,000 Units Subcutaneous Q8H Rebecca Condon MD   5,000 Units at 04/25/22 0610    hydrOXYzine HCL tablet 25 mg  25 mg Oral Q8H PRN Nicolette Rubio NP   25 mg at 04/21/22 0518    levothyroxine tablet 125 mcg  125 mcg Oral Before breakfast Stephany Shah NP   125 mcg at 04/25/22 0610    LIDOcaine 5 % patch 1 patch  1 patch Transdermal Q24H Nic Mistry MD   1 patch at 04/24/22 1657    oxybutynin 24 hr tablet 15 mg  15 mg Oral Daily Nic Mistry MD   15 mg at 04/25/22 0923    oxyCODONE immediate release tablet 5 mg  5 mg Oral Q6H PRN Rebecca CUNHA  MD [place]   5 mg at 04/25/22 0610    pantoprazole EC tablet 40 mg  40 mg Oral Daily Nic Mistry MD   40 mg at 04/25/22 0923    polyethylene glycol packet 17 g  17 g Oral Daily Nic Mistry MD   17 g at 04/23/22 0923    promethazine 6.25 mg/5 mL syrup 12.5 mg  12.5 mg Oral Q6H PRN Rebecca Condon MD   12.5 mg at 04/24/22 1126    QUEtiapine tablet 200 mg  200 mg Oral Nightly Stephany Shah, NP   200 mg at 04/24/22 2220    senna tablet 2 tablet  2 tablet Oral BID Stephany Shah NP   2 tablet at 04/25/22 0923    sodium chloride 0.9% flush 10 mL  10 mL Intravenous PRN Stephany Shah, NP   10 mL at 04/16/22 1347    traMADoL tablet 50 mg  50 mg Oral Q6H PRN Rebecca Condon MD   50 mg at 04/24/22 1655    traZODone tablet 300 mg  300 mg Oral QHS Stephany Shah, NP   300 mg at 04/24/22 2220     Current Discharge Medication List      START taking these medications    Details   cephALEXin (KEFLEX) 250 mg/5 mL suspension Take 10 mLs (500 mg total) by mouth every 12 (twelve) hours. for 10 days  Qty: 200 mL, Refills: 0         CONTINUE these medications which have NOT CHANGED    Details   aspirin (ECOTRIN) 81 MG EC tablet Take 1 tablet (81 mg total) by mouth once daily.  Refills: 0      atorvastatin (LIPITOR) 80 MG tablet TAKE ONE TABLET BY MOUTH EVERY DAY  Qty: 90 tablet, Refills: 3    Associated Diagnoses: Mixed hyperlipidemia      butalbital-acetaminophen-caffeine -40 mg (FIORICET, ESGIC) -40 mg per tablet Take 1 tablet by mouth daily as needed.  Qty: 15 tablet, Refills: 0      FLUoxetine 20 MG capsule Take 3 capsules (60 mg total) by mouth once daily.  Qty: 270 capsule, Refills: 1    Associated Diagnoses: Anxiety      levothyroxine (SYNTHROID) 125 MCG tablet Take 1 tablet (125 mcg total) by mouth before breakfast.  Qty: 90 tablet, Refills: 3    Associated Diagnoses: Primary hypothyroidism      lidocaine (LIDODERM) 5 % daily as needed.       nitroGLYCERIN (NITROSTAT) 0.4 MG SL tablet  Place 1 tablet (0.4 mg total) under the tongue every 5 (five) minutes as needed for Chest pain.  Qty: 25 tablet, Refills: 3    Associated Diagnoses: Coronary artery disease involving native coronary artery of native heart with angina pectoris      ondansetron (ZOFRAN-ODT) 4 MG TbDL Take 1 tablet (4 mg total) by mouth every 8 (eight) hours as needed (nausea).  Qty: 12 tablet, Refills: 0      oxybutynin (DITROPAN XL) 15 MG TR24 Take 1 tablet (15 mg total) by mouth once daily.  Qty: 90 tablet, Refills: 3    Associated Diagnoses: Neurogenic bladder      pantoprazole (PROTONIX) 40 MG tablet TAKE ONE TABLET BY MOUTH EVERY DAY  Qty: 90 tablet, Refills: 3      promethazine (PHENERGAN) 25 MG tablet Take 25 mg by mouth every 6 (six) hours as needed for Nausea.      !! QUEtiapine (SEROQUEL) 100 MG Tab Take 2 tablets (200 mg total) by mouth nightly.  Qty: 180 tablet, Refills: 3    Comments: Take 200 mg at bedtime  Associated Diagnoses: Insomnia due to medical condition      !! QUEtiapine (SEROQUEL) 25 MG Tab Take 12.5-25 mg twice daily as needed for anxiety  Qty: 180 tablet, Refills: 1      senna (SENNA LAX) 8.6 mg tablet Take 2 tablets by mouth 2 (two) times daily.      traZODone (DESYREL) 100 MG tablet Take 3 tablets (300 mg total) by mouth every evening.  Qty: 270 tablet, Refills: 1    Associated Diagnoses: Insomnia, unspecified type      intrathecal pain pump compound Hydromorphone (7.5 mg/mL) infusion at 3.6799 mg/day (0.1533 mg/hr) out of a total reservoir volume of 37.3 mL  Pump filled every 2 months      promethazine (PHENERGAN) 6.25 mg/5 mL syrup Take 10 mLs (12.5 mg total) by mouth every 6 (six) hours as needed.  Qty: 473 mL, Refills: 0       !! - Potential duplicate medications found. Please discuss with provider.      STOP taking these medications       potassium chloride (MICRO-K) 10 MEQ CpSR Comments:   Reason for Stopping:         torsemide (DEMADEX) 100 MG Tab Comments:   Reason for Stopping:                 I  have seen and examined this patient within the last 30 days. My clinical findings that support the need for the home health skilled services and home bound status are the following:no   Weakness/numbness causing balance and gait disturbance due to Weakness/Debility making it taxing to leave home.     Diet:   cardiac diet    Labs:  BMP in 1 week.    Referrals/ Consults  Physical Therapy to evaluate and treat. Evaluate for home safety and equipment needs; Establish/upgrade home exercise program. Perform / instruct on therapeutic exercises, gait training, transfer training, and Range of Motion.  Occupational Therapy to evaluate and treat. Evaluate home environment for safety and equipment needs. Perform/Instruct on transfers, ADL training, ROM, and therapeutic exercises.    Activities:   activity as tolerated    Nursing:   Agency to admit patient within 24 hours of hospital discharge unless specified on physician order or at patient request    SN to complete comprehensive assessment including routine vital signs. Instruct on disease process and s/s of complications to report to MD. Review/verify medication list sent home with the patient at time of discharge  and instruct patient/caregiver as needed. Frequency may be adjusted depending on start of care date.     Skilled nurse to perform up to 3 visits PRN for symptoms related to diagnosis    Notify MD if SBP > 160 or < 90; DBP > 90 or < 50; HR > 120 or < 50; Temp > 101; O2 < 88%; Other:       Ok to schedule additional visits based on staff availability and patient request on consecutive days within the home health episode.    When multiple disciplines ordered:    Start of Care occurs on Sunday - Wednesday schedule remaining discipline evaluations as ordered on separate consecutive days following the start of care.    Thursday SOC -schedule subsequent evaluations Friday and Monday the following week.     Friday - Saturday SOC - schedule subsequent discipline evaluations  on consecutive days starting Monday of the following week.    For all post-discharge communication and subsequent orders please contact patient's primary care physician.    Miscellaneous   Heart Failure:      SN to instruct on the following:    Instruct on the definition of CHF.   Instruct on the signs/sympoms of CHF to be reported.   Instruct on and monitor daily weights.   Instruct on factors that cause exacerbation.   Instruct on action, dose, schedule, and side effects of medications.   Instruct on diet as prescribed.   Instruct on activity allowed.   Instruct on life-style modifications for life long management of CHF   SN to assess compliance with daily weights, diet, medications, fluid retention,    safety precautions, activities permitted and life-style modifications.   Additional 1-2 SN visits per week as needed for signs and symptoms     of CHF exacerbation.      For Weight Gain > 2-3 lbs in 1 day or 4-6 lbs over 1 week notify PCP    Home Health Aide:  Physical Therapy Three times weekly and Occupational Therapy Three times weekly    Wound Care Orders  no    I certify that this patient is confined to her home and needs physical therapy and occupational therapy.

## 2022-04-25 NOTE — ASSESSMENT & PLAN NOTE
· MSSA UTI associated with suprapubic catheter, meadows changed in the ER  · ID consulted:  Suggest 2 week course end date 5/2 - IV Ancef then PO Keflex on DC,

## 2022-04-25 NOTE — PLAN OF CARE
AVS and educational attachments shared with patient and friend via [employer]. Discussed thoroughly. Patient and friend verbalized clear understanding using teach back method. Notified bedside nurse of completion of education. At present no distress noted. Patient to be discharged via w/c with escort service and family with all of patient's belonings. Will cont to be available to patient and family and intervene prn.

## 2022-04-25 NOTE — PLAN OF CARE
VN note: VN completed AVS and attachments and notified bedside nurse, Leona. Will cont to be available and intervene prn.

## 2022-04-25 NOTE — ASSESSMENT & PLAN NOTE
· Creatinine 2.8 on admit, baseline around 1.1 - was 5.9 a week ago - 1.2 today  · Has been on Torsemide, but also with decreased PO intake  · Renal US without hydro  · Stop Torsemide for now  · Continue with gentle IVF boluses as Creatinine is improving  · Improved. Cont to hold torsemide. Cardiology/Nephro follow up.

## 2022-04-25 NOTE — PLAN OF CARE
[facility] - Telemetry  Discharge Final Note    Primary Care Provider: Mesfin Hodges Ii, MD    Expected Discharge Date: 4/25/2022    Final Discharge Note (most recent)       Final Note - 04/25/22 1545          Final Note    Assessment Type Final Discharge Note (P)      Anticipated Discharge Disposition Home-Health Care Svc (P)    [facility]                    Important Message from Medicare  Important Message from Medicare regarding Discharge Appeal Rights: Given to patient/caregiver, Explained to patient/caregiver, Signed/date by patient/caregiver     Date IMM was signed: 04/25/22  Time IMM was signed: 1225    Contact Info       Mesfin Hodges Ii, MD   Specialty: Internal Medicine   Relationship: PCP - General    [address]  [place] [address]   Phone: [phone]       Next Steps: Follow up on 5/3/2022    Instructions: at 9:20 AM; PCP hospital follow up appointment    Shireen Mayo MD   Specialty: Urology    [address]  [place] [address]   Phone: [phone]       Next Steps: Follow up on 5/2/2022    Instructions: at 11:00 AM; previously scheduled urology appointment    René Gomez MD   Specialty: Infectious Diseases    [address] [facility] [address]   Phone: [phone]       Next Steps: Follow up on 5/19/2022    Instructions: at 8:30AM; infectious disease hospital follow up appointment    [facility]    Address: [address] [place] [address] [place] [address]  Phone: [phone]       Next Steps: Call    Instructions: Call to apply for Meals-on-Wheels, transportation assistance, and additional services provided by the [facility].    [facility]    [address] [place] [id number]   Phone: [phone]       Next Steps: Call on 4/26/2022    Instructions: will provide home health services

## 2022-04-25 NOTE — PT/OT/SLP PROGRESS
Physical Therapy Treatment    Patient Name:  Tasha Hawley   MRN:  [id number]    Recommendations:     Discharge Recommendations:  home health PT   Discharge Equipment Recommendations: none   Barriers to discharge: decreased mobility,strength and endurance    Assessment:     Tasha Hawley is a 66 y.o. female admitted with a medical diagnosis of CARITO (acute kidney injury).  She presents with the following impairments/functional limitations:  weakness, impaired endurance, impaired functional mobilty, gait instability, impaired balance, decreased lower extremity function, pain, decreased ROM, impaired coordination,pt with improving status and will benefit from  services upon discharge.    Rehab Prognosis: Fair; patient would benefit from acute skilled PT services to address these deficits and reach maximum level of function.    Recent Surgery: * No surgery found *      Plan:     During this hospitalization, patient to be seen 5 x/week to address the identified rehab impairments via gait training, therapeutic activities, therapeutic exercises, neuromuscular re-education and progress toward the following goals:    · Plan of Care Expires:  05/22/22    Subjective     Chief Complaint: n/a  Patient/Family Comments/goals: pt is going home today.  Pain/Comfort:  · Pain Rating 1:  (no rating)  · Location - Orientation 1: upper  · Location 1: back (chronic)  · Pain Addressed 1: Reposition, Distraction      Objective:     Communicated with nsg prior to session.  Patient found supine with meadows catheter, telemetry upon PT entry to room.     General Precautions: Standard, fall, hearing impaired   Orthopedic Precautions:N/A   Braces: N/A  Respiratory Status: Room air     Functional Mobility:  · Bed Mobility:     · Supine to Sit: modified independence  · Sit to Supine: modified independence  · Transfers:     · Sit to Stand:  supervision with rolling walker  · Balance: fair standing balance with RW      AM-PAC 6 CLICK  MOBILITY  Turning over in bed (including adjusting bedclothes, sheets and blankets)?: 4  Sitting down on and standing up from a chair with arms (e.g., wheelchair, bedside commode, etc.): 3  Moving from lying on back to sitting on the side of the bed?: 4  Moving to and from a bed to a chair (including a wheelchair)?: 3  Need to walk in hospital room?: 3  Climbing 3-5 steps with a railing?: 2  Basic Mobility Total Score: 19       Therapeutic Activities and Exercises:  Initiated gait and pt's catheter was dripping urine,sat pt EOB and cleaned area,nsg present to empty bag,le seated ex's X 10 reps,nsg to return and replace bag,reviewed pt safety      Patient left supine with all lines intact, call button in reach, nsg  notified and friend present..    GOALS:   Multidisciplinary Problems     Physical Therapy Goals        Problem: Physical Therapy    Goal Priority Disciplines Outcome Goal Variances Interventions   Physical Therapy Goal     PT, PT/OT Ongoing, Progressing     Description: Goals to be met by: discharge date     Patient will increase functional independence with mobility by performin. Supine to sit with Modified [place]  2. Sit to supine with Modified [place]  3. Sit to stand transfer with Modified [place]  4. Bed to chair transfer with Modified [place] and Supervision using Rolling Walker and janeen OOB chair > 1 hr  5. Gait  x 100 feet with Stand-by Assistance using Rolling Walker.   6. Lower extremity exercise program x15 reps                    Time Tracking:     PT Received On: 22  PT Start Time: 1311     PT Stop Time: 1334  PT Total Time (min): 23 min     Billable Minutes: Therapeutic Activity 13 and Therapeutic Exercise 10    Treatment Type: Treatment  PT/PTA: PTA     PTA Visit Number: 1     2022

## 2022-04-25 NOTE — PLAN OF CARE
1030  ARTURO was informed by Dr. Mratel that the patient is medically stable to discharge home today.     1130  Message sent to the team informing of above & requesting updated HH orders. Message sent to Osei  via [facility] informing of discharge status. Previously scheduled hospital follow up appointments noted.     1255  Patient resting quietly in bed with friend, Fermin, at the bedside when CM rounded. Patient in agreement with plan to discharge home with Ose[facility]  today, denied the need for assistance with transportation at time of discharge, & verbalized understanding regarding hospital follow up appointments. Patient awaiting delivery of prescription medications prior to discharge. CM informed [facility] out-pt pharmacist, Feliciano ([phone]), that the $42 copay will be paid for by the patient's spouse Dangelo Hawley. Fermin to provide transportation at time of discharge. Message sent to the team informing of the discharge status.     1330  CM received confirmation from Prerna ([phone]) w/[facility] that the patient has been accepted.       Will continue to follow.

## 2022-04-25 NOTE — PROGRESS NOTES
[facility]                    Pharmacy       Discharge Medication Education    Patient ACCEPTED medication education. Pharmacy has provided education on the name, indication, and possible side effects of the medication(s) prescribed, using teach-back method.     The following medications have also been discussed, during this admission.        Medication List        START taking these medications      cephALEXin 250 mg/5 mL suspension  Commonly known as: KEFLEX  Take 10 mLs (500 mg total) by mouth every 12 (twelve) hours. for 10 days            CHANGE how you take these medications      * promethazine 25 MG tablet  Commonly known as: PHENERGAN  What changed: Another medication with the same name was added. Make sure you understand how and when to take each.     * promethazine 6.25 mg/5 mL syrup  Commonly known as: PHENERGAN  Take 10 mLs (12.5 mg total) by mouth every 6 (six) hours as needed.  What changed: You were already taking a medication with the same name, and this prescription was added. Make sure you understand how and when to take each.           * This list has 2 medication(s) that are the same as other medications prescribed for you. Read the directions carefully, and ask your doctor or other care provider to review them with you.                CONTINUE taking these medications      aspirin 81 MG EC tablet  Commonly known as: ECOTRIN  Take 1 tablet (81 mg total) by mouth once daily.     atorvastatin 80 MG tablet  Commonly known as: LIPITOR  TAKE ONE TABLET BY MOUTH EVERY DAY     butalbital-acetaminophen-caffeine -40 mg -40 mg per tablet  Commonly known as: FIORICET, ESGIC  Take 1 tablet by mouth daily as needed.     FLUoxetine 20 MG capsule  Take 3 capsules (60 mg total) by mouth once daily.     INTRATHECAL PAIN PUMP COMPOUND     levothyroxine 125 MCG tablet  Commonly known as: SYNTHROID  Take 1 tablet (125 mcg total) by mouth before breakfast.     LIDOcaine 5 %  Commonly  known as: LIDODERM     nitroGLYCERIN 0.4 MG SL tablet  Commonly known as: NITROSTAT  Place 1 tablet (0.4 mg total) under the tongue every 5 (five) minutes as needed for Chest pain.     ondansetron 4 MG Tbdl  Commonly known as: ZOFRAN-ODT  Take 1 tablet (4 mg total) by mouth every 8 (eight) hours as needed (nausea).     oxybutynin 15 MG Tr24  Commonly known as: DITROPAN XL  Take 1 tablet (15 mg total) by mouth once daily.     pantoprazole 40 MG tablet  Commonly known as: PROTONIX  TAKE ONE TABLET BY MOUTH EVERY DAY     * QUEtiapine 25 MG Tab  Commonly known as: SEROQUEL  Take 12.5-25 mg twice daily as needed for anxiety     * QUEtiapine 100 MG Tab  Commonly known as: SEROQUEL  Take 2 tablets (200 mg total) by mouth nightly.     senna 8.6 mg tablet  Commonly known as: SENNA LAX  Take 2 tablets by mouth 2 (two) times daily.     traZODone 100 MG tablet  Commonly known as: DESYREL  Take 3 tablets (300 mg total) by mouth every evening.           * This list has 2 medication(s) that are the same as other medications prescribed for you. Read the directions carefully, and ask your doctor or other care provider to review them with you.                STOP taking these medications      potassium chloride 10 MEQ Cpsr  Commonly known as: MICRO-K     torsemide 100 MG Tab  Commonly known as: DEMADEX               Where to Get Your Medications        These medications were sent to [facility]  [address], [place] [address]      Hours: Mon-Fri, 8a-5:30p Phone: [phone]   cephALEXin 250 mg/5 mL suspension  promethazine 6.25 mg/5 mL syrup          Thank you  Chelsey Bryson, PharmD  [phone]

## 2022-04-25 NOTE — PLAN OF CARE
Problem: Physical Therapy  Goal: Physical Therapy Goal  Description: Goals to be met by: discharge date     Patient will increase functional independence with mobility by performin. Supine to sit with Modified [place]  2. Sit to supine with Modified [place]  3. Sit to stand transfer with Modified [place]  4. Bed to chair transfer with Modified [place] and Supervision using Rolling Walker and janeen OOB chair > 1 hr  5. Gait  x 100 feet with Stand-by Assistance using Rolling Walker.   6. Lower extremity exercise program x15 reps   Outcome: Ongoing, Progressing

## 2022-04-25 NOTE — PT/OT/SLP PROGRESS
Occupational Therapy      Patient Name:  Tasha Hawley   MRN:  [id number]    Patient not seen today secondary to  (pt discharged prior to OT visit.). Will follow-up no.    4/25/2022

## 2022-04-25 NOTE — PLAN OF CARE
Removed all IV's and telemetry from pt. VN notified of AVS in pt hand. Family at bedside waiting to transport pt home.

## 2022-04-26 ENCOUNTER — PATIENT OUTREACH (OUTPATIENT)
Dept: ADMINISTRATIVE | Facility: OTHER | Age: 67
End: 2022-04-26
Payer: MEDICARE

## 2022-04-26 PROCEDURE — G0180 PR HOME HEALTH MD CERTIFICATION: ICD-10-PCS | Mod: ,,, | Performed by: HOSPITALIST

## 2022-04-26 PROCEDURE — G0180 MD CERTIFICATION HHA PATIENT: HCPCS | Mod: ,,, | Performed by: HOSPITALIST

## 2022-04-26 NOTE — PROGRESS NOTES
IP Liaison - Final Visit Note    Patient: Tasha Hawley  MRN:  [id number]  Date of Service:  4/26/2022  Completed by:  ML Weir    Reason for Visit   Patient presents with    IP Liaison Chart Review       Patient Summary     Discharge Date: 4/25/2022  Discharge telephone number/address: [phone] / [address] [place] [address]  Follow up provider: Mesfin Hodges Ii, MD / Shireen Mayo MD / René MOELLER/ MD Patricia  Follow up appointments: 4/29/2022 @ 10:20am / 5/2/2022 @ 11:00am / 5/19/2022 @ 8:30am  Home Health agency & telephone number: [facility] ordered &  name: n/a  Assigned OPCM RN/SW: n/a  Report sent to follow up team (PCP/OPCM) via in basket message: n/a  Community Resources arranged including agency name & contact info: referral to [facility]      ML Weir

## 2022-04-27 ENCOUNTER — PATIENT OUTREACH (OUTPATIENT)
Dept: ADMINISTRATIVE | Facility: CLINIC | Age: 67
End: 2022-04-27
Payer: MEDICARE

## 2022-04-29 ENCOUNTER — LAB VISIT (OUTPATIENT)
Dept: LAB | Facility: HOSPITAL | Age: 67
End: 2022-04-29
Attending: INTERNAL MEDICINE
Payer: MEDICARE

## 2022-04-29 ENCOUNTER — OFFICE VISIT (OUTPATIENT)
Dept: INTERNAL MEDICINE | Facility: CLINIC | Age: 67
End: 2022-04-29
Payer: MEDICARE

## 2022-04-29 VITALS
SYSTOLIC BLOOD PRESSURE: 100 MMHG | OXYGEN SATURATION: 95 % | HEART RATE: 75 BPM | WEIGHT: 184.75 LBS | BODY MASS INDEX: 31.54 KG/M2 | DIASTOLIC BLOOD PRESSURE: 64 MMHG | HEIGHT: 64 IN

## 2022-04-29 DIAGNOSIS — N39.0 RECURRENT UTI: ICD-10-CM

## 2022-04-29 DIAGNOSIS — M06.9 RHEUMATOID ARTHRITIS, INVOLVING UNSPECIFIED SITE, UNSPECIFIED WHETHER RHEUMATOID FACTOR PRESENT: ICD-10-CM

## 2022-04-29 DIAGNOSIS — I25.118 CORONARY ARTERY DISEASE OF NATIVE ARTERY OF NATIVE HEART WITH STABLE ANGINA PECTORIS: ICD-10-CM

## 2022-04-29 DIAGNOSIS — E86.0 DEHYDRATION: Primary | ICD-10-CM

## 2022-04-29 DIAGNOSIS — D64.9 ANEMIA, UNSPECIFIED TYPE: ICD-10-CM

## 2022-04-29 DIAGNOSIS — E86.0 DEHYDRATION: ICD-10-CM

## 2022-04-29 LAB
ANION GAP SERPL CALC-SCNC: 13 MMOL/L (ref 8–16)
BASOPHILS # BLD AUTO: 0.03 K/UL (ref 0–0.2)
BASOPHILS NFR BLD: 0.7 % (ref 0–1.9)
BUN SERPL-MCNC: 5 MG/DL (ref 8–23)
CALCIUM SERPL-MCNC: 9.8 MG/DL (ref 8.7–10.5)
CHLORIDE SERPL-SCNC: 96 MMOL/L (ref 95–110)
CO2 SERPL-SCNC: 31 MMOL/L (ref 23–29)
CREAT SERPL-MCNC: 1 MG/DL (ref 0.5–1.4)
DIFFERENTIAL METHOD: ABNORMAL
EOSINOPHIL # BLD AUTO: 0.1 K/UL (ref 0–0.5)
EOSINOPHIL NFR BLD: 3.1 % (ref 0–8)
ERYTHROCYTE [DISTWIDTH] IN BLOOD BY AUTOMATED COUNT: 14.9 % (ref 11.5–14.5)
EST. GFR  (AFRICAN AMERICAN): >60 ML/MIN/1.73 M^2
EST. GFR  (NON AFRICAN AMERICAN): 59.3 ML/MIN/1.73 M^2
GLUCOSE SERPL-MCNC: 116 MG/DL (ref 70–110)
HCT VFR BLD AUTO: 32.3 % (ref 37–48.5)
HGB BLD-MCNC: 10.1 G/DL (ref 12–16)
IMM GRANULOCYTES # BLD AUTO: 0.03 K/UL (ref 0–0.04)
IMM GRANULOCYTES NFR BLD AUTO: 0.7 % (ref 0–0.5)
LYMPHOCYTES # BLD AUTO: 1.4 K/UL (ref 1–4.8)
LYMPHOCYTES NFR BLD: 29.7 % (ref 18–48)
MCH RBC QN AUTO: 28.9 PG (ref 27–31)
MCHC RBC AUTO-ENTMCNC: 31.3 G/DL (ref 32–36)
MCV RBC AUTO: 93 FL (ref 82–98)
MONOCYTES # BLD AUTO: 0.4 K/UL (ref 0.3–1)
MONOCYTES NFR BLD: 9.5 % (ref 4–15)
NEUTROPHILS # BLD AUTO: 2.6 K/UL (ref 1.8–7.7)
NEUTROPHILS NFR BLD: 56.3 % (ref 38–73)
NRBC BLD-RTO: 0 /100 WBC
PLATELET # BLD AUTO: 209 K/UL (ref 150–450)
PMV BLD AUTO: 10.9 FL (ref 9.2–12.9)
POTASSIUM SERPL-SCNC: 3.3 MMOL/L (ref 3.5–5.1)
RBC # BLD AUTO: 3.49 M/UL (ref 4–5.4)
RHEUMATOID FACT SERPL-ACNC: <13 IU/ML (ref 0–15)
SODIUM SERPL-SCNC: 140 MMOL/L (ref 136–145)
WBC # BLD AUTO: 4.55 K/UL (ref 3.9–12.7)

## 2022-04-29 PROCEDURE — 3074F SYST BP LT 130 MM HG: CPT | Mod: CPTII,S$GLB,, | Performed by: INTERNAL MEDICINE

## 2022-04-29 PROCEDURE — 99499 RISK ADDL DX/OHS AUDIT: ICD-10-PCS | Mod: HCNC,S$GLB,, | Performed by: INTERNAL MEDICINE

## 2022-04-29 PROCEDURE — 3074F PR MOST RECENT SYSTOLIC BLOOD PRESSURE < 130 MM HG: ICD-10-PCS | Mod: CPTII,S$GLB,, | Performed by: INTERNAL MEDICINE

## 2022-04-29 PROCEDURE — 3066F NEPHROPATHY DOC TX: CPT | Mod: CPTII,S$GLB,, | Performed by: INTERNAL MEDICINE

## 2022-04-29 PROCEDURE — 99499 UNLISTED E&M SERVICE: CPT | Mod: HCNC,S$GLB,, | Performed by: INTERNAL MEDICINE

## 2022-04-29 PROCEDURE — 1101F PR PT FALLS ASSESS DOC 0-1 FALLS W/OUT INJ PAST YR: ICD-10-PCS | Mod: CPTII,S$GLB,, | Performed by: INTERNAL MEDICINE

## 2022-04-29 PROCEDURE — 80048 BASIC METABOLIC PNL TOTAL CA: CPT | Performed by: INTERNAL MEDICINE

## 2022-04-29 PROCEDURE — 3288F FALL RISK ASSESSMENT DOCD: CPT | Mod: CPTII,S$GLB,, | Performed by: INTERNAL MEDICINE

## 2022-04-29 PROCEDURE — 1160F PR REVIEW ALL MEDS BY PRESCRIBER/CLIN PHARMACIST DOCUMENTED: ICD-10-PCS | Mod: CPTII,S$GLB,, | Performed by: INTERNAL MEDICINE

## 2022-04-29 PROCEDURE — 3008F BODY MASS INDEX DOCD: CPT | Mod: CPTII,S$GLB,, | Performed by: INTERNAL MEDICINE

## 2022-04-29 PROCEDURE — 1159F PR MEDICATION LIST DOCUMENTED IN MEDICAL RECORD: ICD-10-PCS | Mod: CPTII,S$GLB,, | Performed by: INTERNAL MEDICINE

## 2022-04-29 PROCEDURE — 1111F PR DISCHARGE MEDS RECONCILED W/ CURRENT OUTPATIENT MED LIST: ICD-10-PCS | Mod: CPTII,S$GLB,, | Performed by: INTERNAL MEDICINE

## 2022-04-29 PROCEDURE — 99999 PR PBB SHADOW E&M-EST. PATIENT-LVL IV: CPT | Mod: PBBFAC,,, | Performed by: INTERNAL MEDICINE

## 2022-04-29 PROCEDURE — 1160F RVW MEDS BY RX/DR IN RCRD: CPT | Mod: CPTII,S$GLB,, | Performed by: INTERNAL MEDICINE

## 2022-04-29 PROCEDURE — 1101F PT FALLS ASSESS-DOCD LE1/YR: CPT | Mod: CPTII,S$GLB,, | Performed by: INTERNAL MEDICINE

## 2022-04-29 PROCEDURE — 3288F PR FALLS RISK ASSESSMENT DOCUMENTED: ICD-10-PCS | Mod: CPTII,S$GLB,, | Performed by: INTERNAL MEDICINE

## 2022-04-29 PROCEDURE — 99999 PR PBB SHADOW E&M-EST. PATIENT-LVL IV: ICD-10-PCS | Mod: PBBFAC,,, | Performed by: INTERNAL MEDICINE

## 2022-04-29 PROCEDURE — 85025 COMPLETE CBC W/AUTO DIFF WBC: CPT | Performed by: INTERNAL MEDICINE

## 2022-04-29 PROCEDURE — 3008F PR BODY MASS INDEX (BMI) DOCUMENTED: ICD-10-PCS | Mod: CPTII,S$GLB,, | Performed by: INTERNAL MEDICINE

## 2022-04-29 PROCEDURE — 1125F PR PAIN SEVERITY QUANTIFIED, PAIN PRESENT: ICD-10-PCS | Mod: CPTII,S$GLB,, | Performed by: INTERNAL MEDICINE

## 2022-04-29 PROCEDURE — 99214 PR OFFICE/OUTPT VISIT, EST, LEVL IV, 30-39 MIN: ICD-10-PCS | Mod: S$GLB,,, | Performed by: INTERNAL MEDICINE

## 2022-04-29 PROCEDURE — 1159F MED LIST DOCD IN RCRD: CPT | Mod: CPTII,S$GLB,, | Performed by: INTERNAL MEDICINE

## 2022-04-29 PROCEDURE — 99214 OFFICE O/P EST MOD 30 MIN: CPT | Mod: S$GLB,,, | Performed by: INTERNAL MEDICINE

## 2022-04-29 PROCEDURE — 3066F PR DOCUMENTATION OF TREATMENT FOR NEPHROPATHY: ICD-10-PCS | Mod: CPTII,S$GLB,, | Performed by: INTERNAL MEDICINE

## 2022-04-29 PROCEDURE — 1111F DSCHRG MED/CURRENT MED MERGE: CPT | Mod: CPTII,S$GLB,, | Performed by: INTERNAL MEDICINE

## 2022-04-29 PROCEDURE — 86431 RHEUMATOID FACTOR QUANT: CPT | Performed by: INTERNAL MEDICINE

## 2022-04-29 PROCEDURE — 3078F DIAST BP <80 MM HG: CPT | Mod: CPTII,S$GLB,, | Performed by: INTERNAL MEDICINE

## 2022-04-29 PROCEDURE — 36415 COLL VENOUS BLD VENIPUNCTURE: CPT | Performed by: INTERNAL MEDICINE

## 2022-04-29 PROCEDURE — 1125F AMNT PAIN NOTED PAIN PRSNT: CPT | Mod: CPTII,S$GLB,, | Performed by: INTERNAL MEDICINE

## 2022-04-29 PROCEDURE — 3078F PR MOST RECENT DIASTOLIC BLOOD PRESSURE < 80 MM HG: ICD-10-PCS | Mod: CPTII,S$GLB,, | Performed by: INTERNAL MEDICINE

## 2022-04-29 RX ORDER — HYDROCODONE BITARTRATE AND ACETAMINOPHEN 10; 325 MG/1; MG/1
TABLET ORAL
COMMUNITY
Start: 2022-03-02 | End: 2023-01-06

## 2022-04-29 NOTE — PROGRESS NOTES
"Subjective:       Patient ID: Tasha Hawley is a 66 y.o. female.    Chief Complaint:   Hospital Follow Up    HPI - Admitted 4/14-25 for UTI, CARITO and hypotension/bradycardia.  Today, she feels well.  Most of the symptoms have resolved.  She does have a headache.  States that she's having worsening "rheumatoid arthritis", though that is not on her problem list.  She states she's had that problem in the past.  She has an EIC on her posterior right shoulder, and wants me to osei that (no).  She had anemia during the admission, but most other labs stayed stable.  She has had her covid-19 vaccinations.    PMH:  Neurogenic bladder, with recurrent UTI's. Followed by urogyn (Milena).  Suprapubic catheter  CAD, with ACS in Jan 2016 - subtot mid LAD lesion without PCI; ischemic CM with EF 40% and chronic LE edema. Followed by [facility]  Chronic insomnia  Lymphedema bilateral LE's  Intermittent nausea  Chronic low back pain with narcotic use.  Indwelling narcotic pump  History CVA with dysarthria  Hypothyroidism, stable  CECI, not on CPAP  Anxiety and Depression  Chronic constipation, likely d/t ongoing narcotic use.     Meds: Reviewed and reconciled in EPIC with patient during visit today.     Review of Systems   Constitutional: Positive for fatigue.   HENT: Negative for congestion.    Respiratory: Negative for shortness of breath.    Cardiovascular: Negative for leg swelling.   Gastrointestinal: Negative for abdominal pain.   Genitourinary: Negative for difficulty urinating.   Musculoskeletal: Positive for back pain.   Skin: Negative for rash.   Neurological: Positive for headaches.   Psychiatric/Behavioral: Positive for sleep disturbance.       Objective:      Physical Exam  Vitals reviewed.   Constitutional:       Appearance: She is obese.   HENT:      Head: Normocephalic and atraumatic.   Musculoskeletal:         General: No swelling.      Right lower leg: Edema (minimal today, both right and left) present. "      Left lower leg: Edema present.   Neurological:      Mental Status: She is alert. Mental status is at baseline.   Psychiatric:         Mood and Affect: Mood normal.         Assessment:       1. Dehydration    2. Coronary artery disease of native artery of native heart with stable angina pectoris    3. Recurrent UTI    4. Rheumatoid arthritis, involving unspecified site, unspecified whether rheumatoid factor present    5. Anemia, unspecified type        Plan:       Tasha was seen today for hospital follow up.    Diagnoses and all orders for this visit:    Dehydration - seems to be resolving.  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

## 2022-04-30 ENCOUNTER — PATIENT MESSAGE (OUTPATIENT)
Dept: INTERNAL MEDICINE | Facility: CLINIC | Age: 67
End: 2022-04-30
Payer: MEDICARE

## 2022-04-30 RX ORDER — POTASSIUM CHLORIDE 20 MEQ/1
20 TABLET, EXTENDED RELEASE ORAL 2 TIMES DAILY
Qty: 180 TABLET | Refills: 3 | Status: SHIPPED | OUTPATIENT
Start: 2022-04-30 | End: 2023-01-06 | Stop reason: SDUPTHER

## 2022-05-02 ENCOUNTER — PATIENT OUTREACH (OUTPATIENT)
Dept: ADMINISTRATIVE | Facility: OTHER | Age: 67
End: 2022-05-02
Payer: MEDICARE

## 2022-05-04 ENCOUNTER — DOCUMENT SCAN (OUTPATIENT)
Dept: HOME HEALTH SERVICES | Facility: HOSPITAL | Age: 67
End: 2022-05-04
Payer: MEDICARE

## 2022-05-05 ENCOUNTER — TELEPHONE (OUTPATIENT)
Dept: UROLOGY | Facility: CLINIC | Age: 67
End: 2022-05-05
Payer: MEDICARE

## 2022-05-05 NOTE — TELEPHONE ENCOUNTER
----- Message from Marion Kohli sent at 5/5/2022 11:01 AM CDT -----  Regarding: Pt Inquiry  Pt called and requesting a call back in regards to her tube.        [phone]

## 2022-05-06 ENCOUNTER — TELEPHONE (OUTPATIENT)
Dept: UROLOGY | Facility: CLINIC | Age: 67
End: 2022-05-06
Payer: MEDICARE

## 2022-05-06 NOTE — TELEPHONE ENCOUNTER
----- Message from Alessandro Casey sent at 5/6/2022 12:43 PM CDT -----  Contact: [phone]  Pt will not be able to come in for her appt today and would like for Dr. Mayo to give her a call because she is sick.    [phone]

## 2022-05-10 ENCOUNTER — TELEPHONE (OUTPATIENT)
Dept: INTERNAL MEDICINE | Facility: CLINIC | Age: 67
End: 2022-05-10
Payer: MEDICARE

## 2022-05-10 NOTE — TELEPHONE ENCOUNTER
----- Message from Brianna Garcia sent at 5/10/2022  1:54 PM CDT -----  Contact: diana [phone]  Diana with home health is calling the pt had a fall please advise and give return call

## 2022-05-16 ENCOUNTER — OFFICE VISIT (OUTPATIENT)
Dept: UROLOGY | Facility: CLINIC | Age: 67
End: 2022-05-16
Payer: MEDICARE

## 2022-05-16 DIAGNOSIS — R10.2 SUPRAPUBIC PRESSURE: Primary | ICD-10-CM

## 2022-05-16 PROCEDURE — 99442 PR PHYSICIAN TELEPHONE EVALUATION 11-20 MIN: CPT | Mod: 95,,, | Performed by: UROLOGY

## 2022-05-16 PROCEDURE — 1111F DSCHRG MED/CURRENT MED MERGE: CPT | Mod: CPTII,95,, | Performed by: UROLOGY

## 2022-05-16 PROCEDURE — 99442 PR PHYSICIAN TELEPHONE EVALUATION 11-20 MIN: ICD-10-PCS | Mod: 95,,, | Performed by: UROLOGY

## 2022-05-16 PROCEDURE — 1111F PR DISCHARGE MEDS RECONCILED W/ CURRENT OUTPATIENT MED LIST: ICD-10-PCS | Mod: CPTII,95,, | Performed by: UROLOGY

## 2022-05-16 PROCEDURE — 3066F PR DOCUMENTATION OF TREATMENT FOR NEPHROPATHY: ICD-10-PCS | Mod: CPTII,95,, | Performed by: UROLOGY

## 2022-05-16 PROCEDURE — 3066F NEPHROPATHY DOC TX: CPT | Mod: CPTII,95,, | Performed by: UROLOGY

## 2022-05-17 ENCOUNTER — PATIENT MESSAGE (OUTPATIENT)
Dept: UROLOGY | Facility: CLINIC | Age: 67
End: 2022-05-17
Payer: MEDICARE

## 2022-05-18 ENCOUNTER — TELEPHONE (OUTPATIENT)
Dept: INTERNAL MEDICINE | Facility: CLINIC | Age: 67
End: 2022-05-18
Payer: MEDICARE

## 2022-05-18 ENCOUNTER — TELEPHONE (OUTPATIENT)
Dept: UROLOGY | Facility: CLINIC | Age: 67
End: 2022-05-18
Payer: MEDICARE

## 2022-05-18 DIAGNOSIS — R19.7 DIARRHEA, UNSPECIFIED TYPE: Primary | ICD-10-CM

## 2022-05-18 NOTE — TELEPHONE ENCOUNTER
Hi, I am worried that her diarrhea is from an infection. Please ask if she can come in for an urgent care appt tomorrow. If not, I think she has a home health nurse who may be able to do a C dif stool culture test.  I do not think it is a good idea to just send in lomotil for her, without knowing what is the cause of her diarrhea.    Also, I am not sure the cause of the fever blisters.    Let me know if patient has any questions.  Thank you, James Hodges Ii, MD

## 2022-05-18 NOTE — TELEPHONE ENCOUNTER
Spoke with pt and gave her Dr Armstrong next available.      ----- Message from Vinay Brannon sent at 5/18/2022  3:29 PM CDT -----  Contact: pt.  .Type:  Needs Medical Advice    Who Called: pt    Would the patient rather a call back or a response via [facility]? Call back  Best Call Back Number: [phone]   Additional Information: Pt. Is returning a call from the office.

## 2022-05-18 NOTE — TELEPHONE ENCOUNTER
Attempted to return pt call, no answer.      ----- Message from Gracie Talley sent at 5/18/2022  2:35 PM CDT -----  Regarding: call back  Contact: [phone]  Type:  Sooner Apoointment Request    Caller is requesting a sooner appointment.  Caller declined first available appointment listed below.  Caller will not accept being placed on the waitlist and is requesting a message be sent to doctor.  Name of Caller: PT   When is the first available appointment? August   Symptoms: Pressure has a meadows kidney infection   Would the patient rather a call back or a response via [facility]? Call back   Best Call Back Number: [phone]  Additional Information: patient wants schedule with Dr. Armstrong instead of going to Dr. Mayo

## 2022-05-18 NOTE — TELEPHONE ENCOUNTER
Spoke to patient and stated she has what looks like to be fever blisters  Inside and outside of her lip.      Also patient is requesting a rx for Lomotil be sent to pharmacy for diarrhea.  Patient has been having diarrhea for 3 day going 5 times a day. And have been taking imodium with no relief.     Patient declined any n/v or abd pain     please advise

## 2022-05-18 NOTE — TELEPHONE ENCOUNTER
----- Message from Karla Barboza sent at 5/18/2022  3:50 PM CDT -----  Contact: [phone]  .1 Patient would like to get medical advice.  Symptoms (please be specific):breaking out/blister  How long has patient had these symptoms:    Pharmacy name and phone#: [facility] [place] [address] Phone:  [phone]  Fax:  [phone]  Any drug allergies: on file  Comments: Patient would like to get medical advice.  Please call and advise. Thank you

## 2022-05-18 NOTE — TELEPHONE ENCOUNTER
Spoke to patient's  and advised of recommendation. Patient verbalized understanding.      Would like orders to collect specimen.

## 2022-05-19 ENCOUNTER — TELEPHONE (OUTPATIENT)
Dept: INFECTIOUS DISEASES | Facility: CLINIC | Age: 67
End: 2022-05-19
Payer: MEDICARE

## 2022-05-19 NOTE — TELEPHONE ENCOUNTER
----- Message from Benny Mcgill sent at 5/19/2022  9:56 AM CDT -----  Contact: @ [phone]  Patient calling to r/s the 5-19th appt, pls call ( system didn't allow )

## 2022-05-20 ENCOUNTER — OFFICE VISIT (OUTPATIENT)
Dept: INTERNAL MEDICINE | Facility: CLINIC | Age: 67
End: 2022-05-20
Payer: MEDICARE

## 2022-05-20 ENCOUNTER — LAB VISIT (OUTPATIENT)
Dept: LAB | Facility: HOSPITAL | Age: 67
End: 2022-05-20
Attending: INTERNAL MEDICINE
Payer: MEDICARE

## 2022-05-20 VITALS
HEIGHT: 64 IN | BODY MASS INDEX: 31.69 KG/M2 | DIASTOLIC BLOOD PRESSURE: 58 MMHG | WEIGHT: 185.63 LBS | HEART RATE: 50 BPM | SYSTOLIC BLOOD PRESSURE: 110 MMHG | OXYGEN SATURATION: 98 %

## 2022-05-20 DIAGNOSIS — R19.7 DIARRHEA, UNSPECIFIED TYPE: ICD-10-CM

## 2022-05-20 DIAGNOSIS — R19.7 DIARRHEA, UNSPECIFIED TYPE: Primary | ICD-10-CM

## 2022-05-20 DIAGNOSIS — G95.9 CERVICAL MYELOPATHY: ICD-10-CM

## 2022-05-20 DIAGNOSIS — N39.0 RECURRENT UTI (URINARY TRACT INFECTION): ICD-10-CM

## 2022-05-20 DIAGNOSIS — R51.9 NONINTRACTABLE HEADACHE, UNSPECIFIED CHRONICITY PATTERN, UNSPECIFIED HEADACHE TYPE: ICD-10-CM

## 2022-05-20 DIAGNOSIS — Z93.59 SUPRAPUBIC CATHETER: ICD-10-CM

## 2022-05-20 DIAGNOSIS — B00.1 COLD SORE: ICD-10-CM

## 2022-05-20 LAB
C DIFF GDH STL QL: NEGATIVE
C DIFF TOX A+B STL QL IA: NEGATIVE

## 2022-05-20 PROCEDURE — 99499 RISK ADDL DX/OHS AUDIT: ICD-10-PCS | Mod: S$GLB,,, | Performed by: INTERNAL MEDICINE

## 2022-05-20 PROCEDURE — 99999 PR PBB SHADOW E&M-EST. PATIENT-LVL IV: ICD-10-PCS | Mod: PBBFAC,,, | Performed by: INTERNAL MEDICINE

## 2022-05-20 PROCEDURE — 1111F PR DISCHARGE MEDS RECONCILED W/ CURRENT OUTPATIENT MED LIST: ICD-10-PCS | Mod: CPTII,S$GLB,, | Performed by: INTERNAL MEDICINE

## 2022-05-20 PROCEDURE — 1111F DSCHRG MED/CURRENT MED MERGE: CPT | Mod: CPTII,S$GLB,, | Performed by: INTERNAL MEDICINE

## 2022-05-20 PROCEDURE — 1125F AMNT PAIN NOTED PAIN PRSNT: CPT | Mod: CPTII,S$GLB,, | Performed by: INTERNAL MEDICINE

## 2022-05-20 PROCEDURE — 87177 OVA AND PARASITES SMEARS: CPT | Performed by: INTERNAL MEDICINE

## 2022-05-20 PROCEDURE — 99214 OFFICE O/P EST MOD 30 MIN: CPT | Mod: S$GLB,,, | Performed by: INTERNAL MEDICINE

## 2022-05-20 PROCEDURE — 1100F PTFALLS ASSESS-DOCD GE2>/YR: CPT | Mod: CPTII,S$GLB,, | Performed by: INTERNAL MEDICINE

## 2022-05-20 PROCEDURE — 1160F PR REVIEW ALL MEDS BY PRESCRIBER/CLIN PHARMACIST DOCUMENTED: ICD-10-PCS | Mod: CPTII,S$GLB,, | Performed by: INTERNAL MEDICINE

## 2022-05-20 PROCEDURE — 3008F PR BODY MASS INDEX (BMI) DOCUMENTED: ICD-10-PCS | Mod: CPTII,S$GLB,, | Performed by: INTERNAL MEDICINE

## 2022-05-20 PROCEDURE — 3008F BODY MASS INDEX DOCD: CPT | Mod: CPTII,S$GLB,, | Performed by: INTERNAL MEDICINE

## 2022-05-20 PROCEDURE — 99499 UNLISTED E&M SERVICE: CPT | Mod: S$GLB,,, | Performed by: INTERNAL MEDICINE

## 2022-05-20 PROCEDURE — 1125F PR PAIN SEVERITY QUANTIFIED, PAIN PRESENT: ICD-10-PCS | Mod: CPTII,S$GLB,, | Performed by: INTERNAL MEDICINE

## 2022-05-20 PROCEDURE — 87209 SMEAR COMPLEX STAIN: CPT | Performed by: INTERNAL MEDICINE

## 2022-05-20 PROCEDURE — 1159F PR MEDICATION LIST DOCUMENTED IN MEDICAL RECORD: ICD-10-PCS | Mod: CPTII,S$GLB,, | Performed by: INTERNAL MEDICINE

## 2022-05-20 PROCEDURE — 3066F PR DOCUMENTATION OF TREATMENT FOR NEPHROPATHY: ICD-10-PCS | Mod: CPTII,S$GLB,, | Performed by: INTERNAL MEDICINE

## 2022-05-20 PROCEDURE — 99214 PR OFFICE/OUTPT VISIT, EST, LEVL IV, 30-39 MIN: ICD-10-PCS | Mod: S$GLB,,, | Performed by: INTERNAL MEDICINE

## 2022-05-20 PROCEDURE — 3074F PR MOST RECENT SYSTOLIC BLOOD PRESSURE < 130 MM HG: ICD-10-PCS | Mod: CPTII,S$GLB,, | Performed by: INTERNAL MEDICINE

## 2022-05-20 PROCEDURE — 3066F NEPHROPATHY DOC TX: CPT | Mod: CPTII,S$GLB,, | Performed by: INTERNAL MEDICINE

## 2022-05-20 PROCEDURE — 1100F PR PT FALLS ASSESS DOC 2+ FALLS/FALL W/INJURY/YR: ICD-10-PCS | Mod: CPTII,S$GLB,, | Performed by: INTERNAL MEDICINE

## 2022-05-20 PROCEDURE — 3078F PR MOST RECENT DIASTOLIC BLOOD PRESSURE < 80 MM HG: ICD-10-PCS | Mod: CPTII,S$GLB,, | Performed by: INTERNAL MEDICINE

## 2022-05-20 PROCEDURE — 3074F SYST BP LT 130 MM HG: CPT | Mod: CPTII,S$GLB,, | Performed by: INTERNAL MEDICINE

## 2022-05-20 PROCEDURE — 1159F MED LIST DOCD IN RCRD: CPT | Mod: CPTII,S$GLB,, | Performed by: INTERNAL MEDICINE

## 2022-05-20 PROCEDURE — 3288F PR FALLS RISK ASSESSMENT DOCUMENTED: ICD-10-PCS | Mod: CPTII,S$GLB,, | Performed by: INTERNAL MEDICINE

## 2022-05-20 PROCEDURE — 99999 PR PBB SHADOW E&M-EST. PATIENT-LVL IV: CPT | Mod: PBBFAC,,, | Performed by: INTERNAL MEDICINE

## 2022-05-20 PROCEDURE — 87449 NOS EACH ORGANISM AG IA: CPT | Performed by: INTERNAL MEDICINE

## 2022-05-20 PROCEDURE — 3078F DIAST BP <80 MM HG: CPT | Mod: CPTII,S$GLB,, | Performed by: INTERNAL MEDICINE

## 2022-05-20 PROCEDURE — 1160F RVW MEDS BY RX/DR IN RCRD: CPT | Mod: CPTII,S$GLB,, | Performed by: INTERNAL MEDICINE

## 2022-05-20 PROCEDURE — 3288F FALL RISK ASSESSMENT DOCD: CPT | Mod: CPTII,S$GLB,, | Performed by: INTERNAL MEDICINE

## 2022-05-20 RX ORDER — DIPHENOXYLATE HYDROCHLORIDE AND ATROPINE SULFATE 2.5; .025 MG/1; MG/1
1 TABLET ORAL 4 TIMES DAILY PRN
Qty: 60 TABLET | Refills: 0 | Status: SHIPPED | OUTPATIENT
Start: 2022-05-20 | End: 2022-05-20 | Stop reason: SDUPTHER

## 2022-05-20 RX ORDER — DIPHENOXYLATE HYDROCHLORIDE AND ATROPINE SULFATE 2.5; .025 MG/1; MG/1
1 TABLET ORAL 4 TIMES DAILY PRN
Qty: 60 TABLET | Refills: 0 | Status: SHIPPED | OUTPATIENT
Start: 2022-05-20 | End: 2022-05-30

## 2022-05-20 RX ORDER — ACYCLOVIR 50 MG/G
OINTMENT TOPICAL
Qty: 15 G | Refills: 0 | Status: SHIPPED | OUTPATIENT
Start: 2022-05-20 | End: 2023-01-06

## 2022-05-20 NOTE — PROGRESS NOTES
"Subjective:       Patient ID: Tasha Hawley is a 66 y.o. female.    Chief Complaint:   Diarrhea    HPI - she has been having diarrhea "around the clock" with some incontinence for the past 2-3 weeks.  She says that imodium isn't slowing her down and asked for lomotil.  Of note, she has had several ABX courses for recurrent UTI's over the past few months.  Also, she's normally chronically constipated 2/2 longstanding narcotic use, so this is very unusual for her.  Also, she broke out with oral fever blisters 2 days ago and wonders what she should do.  Some aches with this, and the upper lip is tender.  She has had her covid-19 vaccinations and is not a smoker.    PMH:  Neurogenic bladder, with recurrent UTI's. Followed by urogyn (Milena).  Suprapubic catheter  CAD, with ACS in Jan 2016 - subtot mid LAD lesion without PCI; ischemic CM with EF 40% and chronic LE edema. Followed by [facility]  Chronic insomnia  Lymphedema bilateral LE's  Intermittent nausea  Chronic low back pain with narcotic use.  Indwelling narcotic pump  History CVA with dysarthria  Hypothyroidism, stable  CECI, not on CPAP  Anxiety and Depression  Usually with chronic constipation, likely d/t ongoing narcotic use.     Meds: Reviewed and reconciled in EPIC with patient during visit today.    Review of Systems   Constitutional: Negative for fever.   HENT: Negative for congestion.    Respiratory: Negative for shortness of breath.    Cardiovascular: Negative for chest pain.   Gastrointestinal: Positive for blood in stool (scant) and diarrhea. Negative for vomiting.   Genitourinary: Negative for difficulty urinating.   Musculoskeletal: Positive for back pain.   Skin: Negative for rash.   Neurological: Negative for headaches.   Psychiatric/Behavioral: Negative for sleep disturbance.       Objective:      Physical Exam  Constitutional:       Appearance: Normal appearance. She is well-developed.   HENT:      Head: Normocephalic and atraumatic. "   Skin:     Findings: Rash present.   Neurological:      General: No focal deficit present.      Mental Status: She is alert.         Assessment:       1. Diarrhea, unspecified type    2. Cervical myelopathy    3. Cold sore    4. Suprapubic catheter    5. Recurrent UTI (urinary tract infection)    6. Nonintractable headache, unspecified chronicity pattern, unspecified headache type        Plan:       Tasha was seen today for diarrhea.    Diagnoses and all orders for this visit:    Diarrhea, unspecified type - will shift to lomotil to see if that helps. This is abx-associated diarrhea, so I'm waiting on the cdiff study.  Will address that when resulted.    -     Discontinue: diphenoxylate-atropine 2.5-0.025 mg (LOMOTIL) 2.5-0.025 mg per tablet; Take 1 tablet by mouth 4 (four) times daily as needed for Diarrhea.  -     diphenoxylate-atropine 2.5-0.025 mg (LOMOTIL) 2.5-0.025 mg per tablet; Take 1 tablet by mouth 4 (four) times daily as needed for Diarrhea.    Cervical myelopathy - stable on meds    Cold sore - new finding.  topicals  -     acyclovir 5% (ZOVIRAX) 5 % ointment; Apply topically 5 (five) times daily.    Suprapubic catheter - still in place.  Managed by urogyn team    Recurrent UTI (urinary tract infection)    Nonintractable headache, unspecified chronicity pattern, unspecified headache type - worsening with this diarrhea    rtc prn, or in 3 months    PAPO Hodges MD MPH  Staff Internist

## 2022-05-23 ENCOUNTER — TELEPHONE (OUTPATIENT)
Dept: UROLOGY | Facility: CLINIC | Age: 67
End: 2022-05-23
Payer: MEDICARE

## 2022-05-23 NOTE — TELEPHONE ENCOUNTER
----- Message from Barbara Smith RN sent at 5/20/2022  5:13 PM CDT -----  Contact: pt    ----- Message -----  From: Maricel Pradhan  Sent: 5/20/2022   4:42 PM CDT  To: Maria Esther Iyer Staff    Pt requesting call back in regards to having leaking and pressure  it started yesterday .       Confirmed patient's contact info below:  Contact Name: Tasha Hawley  Phone Number: [phone]

## 2022-05-23 NOTE — TELEPHONE ENCOUNTER
Returned call to Nicolette. States pt c/o pressure, suprapubic discomfort and vaginal pain. Pt currently has sp tube so she should not have vaginal irritation.     Per Nicolette, pt has untreated ucx done on 5/12/2022. Asking for advice and treatment of UTI.

## 2022-05-23 NOTE — TELEPHONE ENCOUNTER
----- Message from Ritu Resendez sent at 5/23/2022 11:44 AM CDT -----  Contact: Nicolette Will with [facility] calling in regards to patient stating that she burning and having pressure in her vaginal area around the catheter. She also stated that she needs to speak with the doctor about an RX.       Nicolette @[phone]

## 2022-05-25 LAB — O+P STL MICRO: NORMAL

## 2022-05-26 ENCOUNTER — EXTERNAL HOME HEALTH (OUTPATIENT)
Dept: HOME HEALTH SERVICES | Facility: HOSPITAL | Age: 67
End: 2022-05-26
Payer: MEDICARE

## 2022-05-30 ENCOUNTER — PATIENT MESSAGE (OUTPATIENT)
Dept: ADMINISTRATIVE | Facility: HOSPITAL | Age: 67
End: 2022-05-30
Payer: MEDICARE

## 2022-06-17 ENCOUNTER — DOCUMENT SCAN (OUTPATIENT)
Dept: HOME HEALTH SERVICES | Facility: HOSPITAL | Age: 67
End: 2022-06-17
Payer: MEDICARE

## 2022-06-23 ENCOUNTER — OFFICE VISIT (OUTPATIENT)
Dept: UROLOGY | Facility: CLINIC | Age: 67
End: 2022-06-23
Payer: MEDICARE

## 2022-06-23 VITALS
HEART RATE: 59 BPM | WEIGHT: 185.63 LBS | SYSTOLIC BLOOD PRESSURE: 75 MMHG | BODY MASS INDEX: 31.69 KG/M2 | HEIGHT: 64 IN | DIASTOLIC BLOOD PRESSURE: 56 MMHG

## 2022-06-23 DIAGNOSIS — N31.9 NEUROGENIC BLADDER: Primary | ICD-10-CM

## 2022-06-23 DIAGNOSIS — N39.0 RECURRENT UTI: ICD-10-CM

## 2022-06-23 DIAGNOSIS — Z43.5 ENCOUNTER FOR SUPRAPUBIC CATHETER CARE: ICD-10-CM

## 2022-06-23 DIAGNOSIS — N39.46 MIXED STRESS AND URGE URINARY INCONTINENCE: ICD-10-CM

## 2022-06-23 PROCEDURE — 99214 OFFICE O/P EST MOD 30 MIN: CPT | Mod: S$GLB,,, | Performed by: UROLOGY

## 2022-06-23 PROCEDURE — 1125F AMNT PAIN NOTED PAIN PRSNT: CPT | Mod: CPTII,S$GLB,, | Performed by: UROLOGY

## 2022-06-23 PROCEDURE — 3078F PR MOST RECENT DIASTOLIC BLOOD PRESSURE < 80 MM HG: ICD-10-PCS | Mod: CPTII,S$GLB,, | Performed by: UROLOGY

## 2022-06-23 PROCEDURE — 3288F PR FALLS RISK ASSESSMENT DOCUMENTED: ICD-10-PCS | Mod: CPTII,S$GLB,, | Performed by: UROLOGY

## 2022-06-23 PROCEDURE — 3066F PR DOCUMENTATION OF TREATMENT FOR NEPHROPATHY: ICD-10-PCS | Mod: CPTII,S$GLB,, | Performed by: UROLOGY

## 2022-06-23 PROCEDURE — 1159F MED LIST DOCD IN RCRD: CPT | Mod: CPTII,S$GLB,, | Performed by: UROLOGY

## 2022-06-23 PROCEDURE — 99999 PR PBB SHADOW E&M-EST. PATIENT-LVL V: CPT | Mod: PBBFAC,,, | Performed by: UROLOGY

## 2022-06-23 PROCEDURE — 1159F PR MEDICATION LIST DOCUMENTED IN MEDICAL RECORD: ICD-10-PCS | Mod: CPTII,S$GLB,, | Performed by: UROLOGY

## 2022-06-23 PROCEDURE — 1100F PR PT FALLS ASSESS DOC 2+ FALLS/FALL W/INJURY/YR: ICD-10-PCS | Mod: CPTII,S$GLB,, | Performed by: UROLOGY

## 2022-06-23 PROCEDURE — 3008F BODY MASS INDEX DOCD: CPT | Mod: CPTII,S$GLB,, | Performed by: UROLOGY

## 2022-06-23 PROCEDURE — 99214 PR OFFICE/OUTPT VISIT, EST, LEVL IV, 30-39 MIN: ICD-10-PCS | Mod: S$GLB,,, | Performed by: UROLOGY

## 2022-06-23 PROCEDURE — 3288F FALL RISK ASSESSMENT DOCD: CPT | Mod: CPTII,S$GLB,, | Performed by: UROLOGY

## 2022-06-23 PROCEDURE — 99999 PR PBB SHADOW E&M-EST. PATIENT-LVL V: ICD-10-PCS | Mod: PBBFAC,,, | Performed by: UROLOGY

## 2022-06-23 PROCEDURE — 1100F PTFALLS ASSESS-DOCD GE2>/YR: CPT | Mod: CPTII,S$GLB,, | Performed by: UROLOGY

## 2022-06-23 PROCEDURE — 1125F PR PAIN SEVERITY QUANTIFIED, PAIN PRESENT: ICD-10-PCS | Mod: CPTII,S$GLB,, | Performed by: UROLOGY

## 2022-06-23 PROCEDURE — 3074F SYST BP LT 130 MM HG: CPT | Mod: CPTII,S$GLB,, | Performed by: UROLOGY

## 2022-06-23 PROCEDURE — 99499 UNLISTED E&M SERVICE: CPT | Mod: S$GLB,,, | Performed by: UROLOGY

## 2022-06-23 PROCEDURE — 3074F PR MOST RECENT SYSTOLIC BLOOD PRESSURE < 130 MM HG: ICD-10-PCS | Mod: CPTII,S$GLB,, | Performed by: UROLOGY

## 2022-06-23 PROCEDURE — 3008F PR BODY MASS INDEX (BMI) DOCUMENTED: ICD-10-PCS | Mod: CPTII,S$GLB,, | Performed by: UROLOGY

## 2022-06-23 PROCEDURE — 3066F NEPHROPATHY DOC TX: CPT | Mod: CPTII,S$GLB,, | Performed by: UROLOGY

## 2022-06-23 PROCEDURE — 3078F DIAST BP <80 MM HG: CPT | Mod: CPTII,S$GLB,, | Performed by: UROLOGY

## 2022-06-23 PROCEDURE — 99499 RISK ADDL DX/OHS AUDIT: ICD-10-PCS | Mod: S$GLB,,, | Performed by: UROLOGY

## 2022-06-23 RX ORDER — VIBEGRON 75 MG/1
75 TABLET, FILM COATED ORAL DAILY
Qty: 30 TABLET | Refills: 3 | Status: SHIPPED | OUTPATIENT
Start: 2022-06-23 | End: 2022-08-10

## 2022-06-23 NOTE — PROGRESS NOTES
CHIEF COMPLAINT:    Mrs. Hawley is a 66 y.o. female presenting for a follow up for management of SPT    PRESENTING ILLNESS:    Tasha Hawley is a 66 y.o. female who complains that the suprapubic tube is excessively leaking.  No fevers or chills.  However, she intermittently does not feel well enough to leave her bed, having headaches, the diarrhea or feels debilitated. She was seen in the hospital on 4/14/2022 for dehydration, generalized body aches particularly with lower back pain. Today she is very delirious. She was unable to sleep last night. She stating the botox alleviated the leakage around the SPT until 2 weeks ago. She now has continuous urinary leakage. She currently has a 20 Fr SPT in place.    Previously managed with botox 200u. Last done 11/2021. She would like botox again.    REVIEW OF SYSTEMS:    Review of Systems   Constitutional: Negative.    HENT: Negative.    Eyes: Negative.    Respiratory: Negative.    Cardiovascular: Negative.    Gastrointestinal: Positive for diarrhea.   Genitourinary:        Pelvic pressure   Musculoskeletal: Positive for back pain.   Skin: Negative.    Neurological: Positive for headaches.   Endo/Heme/Allergies: Negative.    Psychiatric/Behavioral: Negative.        PATIENT HISTORY:    Past Medical History:   Diagnosis Date    Anticoagulant long-term use     Anxiety     Arthritis     Bilateral lower extremity edema     severe chronic    Carotid artery occlusion     Cataract     CHF (congestive heart failure)     Coronary artery disease     subtotalled LAD with collateral    Depression     Fever blister     Hypothyroid     Iron deficiency anemia     Lumbar radiculopathy     with chronic pain    Ocular migraine     Renal disorder     Sleep apnea     cpap       Past Surgical History:   Procedure Laterality Date    ADENOIDECTOMY      BACK SURGERY      x 12    CARDIAC CATHETERIZATION  2016    subtotalled LAD with right to left collaterals    CATARACT  EXTRACTION W/  INTRAOCULAR LENS IMPLANT Left     Dr Coleman     CYSTOSCOPIC LITHOLAPAXY N/A 6/27/2019    Procedure: CYSTOLITHOLAPAXY;  Surgeon: Shireen Mayo MD;  Location: [facility] OR 2ND FLR;  Service: Urology;  Laterality: N/A;    CYSTOSCOPIC LITHOLAPAXY N/A 9/3/2019    Procedure: CYSTOLITHOLAPAXY;  Surgeon: Shireen Mayo MD;  Location: [facility] OR 2ND FLR;  Service: Urology;  Laterality: N/A;    CYSTOSCOPY N/A 7/13/2021    Procedure: CYSTOSCOPY;  Surgeon: Shireen Mayo MD;  Location: [facility] OR 1ST FLR;  Service: Urology;  Laterality: N/A;    CYSTOSCOPY  11/16/2021    Procedure: CYSTOSCOPY;  Surgeon: Shireen Mayo MD;  Location: [facility] OR 1ST FLR;  Service: Urology;;    ESOPHAGOGASTRODUODENOSCOPY N/A 5/23/2018    Procedure: ESOPHAGOGASTRODUODENOSCOPY (EGD);  Surgeon: Prince Vance MD;  Location: [facility] (4TH FLR);  Service: Endoscopy;  Laterality: N/A;  r/s 'd per Dr. Vance due to family emergency- ER    HYSTERECTOMY  1975    endometriosis    INJECTION OF BOTULINUM TOXIN TYPE A  7/13/2021    Procedure: INJECTION, BOTULINUM TOXIN, 200units;  Surgeon: Shireen Mayo MD;  Location: [facility] OR 1ST FLR;  Service: Urology;;    INJECTION OF BOTULINUM TOXIN TYPE A  11/16/2021    Procedure: INJECTION, BOTULINUM TOXIN, 200units;  Surgeon: Shireen Mayo MD;  Location: [facility] OR 1ST FLR;  Service: Urology;;    pain pump placement      SQ Dilaudid Pump managed by Dr. Hillman, Pain Management    REPLACEMENT OF CATHETER N/A 10/31/2019    Procedure: REPLACEMENT, CATHETER-SUPRAPUBIC;  Surgeon: Shireen Mayo MD;  Location: [facility] OR 1ST FLR;  Service: Urology;  Laterality: N/A;    SPINAL CORD STIMULATOR REMOVAL      before [facility]    SPINE SURGERY  5-13-13    CERVICAL FUSION    TONSILLECTOMY         Family History   Problem Relation Age of Onset    Cancer Mother 55        breast    Cancer Father         esophagus,had laryngectomy    Esophageal cancer Father     Parkinsonism Maternal Grandmother      Tremor Maternal Grandmother     No Known Problems Brother     No Known Problems Brother     Heart disease Maternal Uncle     Colon cancer Maternal Uncle         Less than 60    No Known Problems Sister     No Known Problems Maternal Aunt     Cirrhosis Paternal Aunt         ETOH    Liver disease Paternal Aunt         ETOH    Liver disease Paternal Uncle         ETOH    Cirrhosis Paternal Uncle         ETOH       Social History     Socioeconomic History    Marital status:    Tobacco Use    Smoking status: Never Smoker    Smokeless tobacco: Never Used   Substance and Sexual Activity    Alcohol use: Never    Drug use: No    Sexual activity: Never     Partners: Male     Social Determinants of Health     Financial Resource Strain: Low Risk     Difficulty of Paying Living Expenses: Not very hard   Food Insecurity: No Food Insecurity    Worried About Running Out of Food in the Last Year: Never true    Ran Out of Food in the Last Year: Never true   Transportation Needs: No Transportation Needs    Lack of Transportation (Medical): No    Lack of Transportation (Non-Medical): No   Physical Activity: Inactive    Days of Exercise per Week: 0 days    Minutes of Exercise per Session: 0 min   Housing Stability: Low Risk     Unable to Pay for Housing in the Last Year: No    Number of Places Lived in the Last Year: 1    Unstable Housing in the Last Year: No       Allergies:  (d)-limonene flavor; Bactrim [sulfamethoxazole-trimethoprim]; Benadryl [diphenhydramine hcl]; Fentanyl; Imitrex [sumatriptan succinate]; Topamax [topiramate]; Vancomycin; Butorphanol tartrate; Darvocet a500 [propoxyphene n-acetaminophen]; White petrolatum-zinc; Zinc oxide-white petrolatum; Latex, natural rubber; and Phenytoin    Medications:  Outpatient Encounter Medications as of 6/23/2022   Medication Sig Dispense Refill    acyclovir 5% (ZOVIRAX) 5 % ointment Apply topically 5 (five) times daily. 15 g 0    atorvastatin  (LIPITOR) 80 MG tablet TAKE ONE TABLET BY MOUTH EVERY DAY 90 tablet 3    butalbital-acetaminophen-caffeine -40 mg (FIORICET, ESGIC) -40 mg per tablet Take 1 tablet by mouth daily as needed. 15 tablet 0    FLUoxetine 20 MG capsule Take 3 capsules (60 mg total) by mouth once daily. 270 capsule 1    HYDROcodone-acetaminophen (NORCO)  mg per tablet       intrathecal pain pump compound Hydromorphone (7.5 mg/mL) infusion at 3.6799 mg/day (0.1533 mg/hr) out of a total reservoir volume of 37.3 mL  Pump filled every 2 months      levothyroxine (SYNTHROID) 125 MCG tablet Take 1 tablet (125 mcg total) by mouth before breakfast. 90 tablet 3    lidocaine (LIDODERM) 5 % daily as needed.       pantoprazole (PROTONIX) 40 MG tablet TAKE ONE TABLET BY MOUTH EVERY DAY 90 tablet 3    potassium chloride SA (K-DUR,KLOR-CON) 20 MEQ tablet Take 1 tablet (20 mEq total) by mouth 2 (two) times daily. 180 tablet 3    promethazine (PHENERGAN) 25 MG tablet Take 25 mg by mouth every 6 (six) hours as needed for Nausea.      QUEtiapine (SEROQUEL) 100 MG Tab TAKE 2 TABLETS (200 MG) BY MOUTH NIGHTLY 180 tablet 3    senna (SENNA LAX) 8.6 mg tablet Take 2 tablets by mouth 2 (two) times daily.      traZODone (DESYREL) 100 MG tablet Take 3 tablets (300 mg total) by mouth every evening. 270 tablet 1    [DISCONTINUED] oxybutynin (DITROPAN XL) 15 MG TR24 Take 1 tablet (15 mg total) by mouth once daily. 90 tablet 3    aspirin (ECOTRIN) 81 MG EC tablet Take 1 tablet (81 mg total) by mouth once daily.  0    nitroGLYCERIN (NITROSTAT) 0.4 MG SL tablet Place 1 tablet (0.4 mg total) under the tongue every 5 (five) minutes as needed for Chest pain. 25 tablet 3    vibegron (GEMTESA) 75 mg Tab Take 75 tablets by mouth Daily. 30 tablet 3    [DISCONTINUED] torsemide (DEMADEX) 100 MG Tab TAKE ONE TABLET BY MOUTH EVERY DAY 90 tablet 0     No facility-administered encounter medications on file as of 6/23/2022.         PHYSICAL  EXAMINATION:    The patient generally sounds in good health, is appropriately interactive, and is in no apparent distress.    Mental: Cooperative with normal affect.    Chest:  normal inspiratory effort.    Abdomen: SPT in place with minimal erythema        LABS:    Lab Results   Component Value Date    BUN 5 (L) 04/29/2022    CREATININE 1.0 04/29/2022     She had a recent culture with an   5/12/2022 ESBL E coli.    4/15/2022 staph aureas   2/3/2022 candida  1/27/2022 pseudomonas fluorescens/putida    IMPRESSION:    Encounter Diagnoses   Name Primary?    Neurogenic bladder Yes    Mixed stress and urge urinary incontinence     Recurrent UTI     Encounter for suprapubic catheter care        PLAN:    1. Schedule for 300u Botox and Macroplastique on 7/19.  Consent signed after discussing risks per the consent  2. Gemtezsa prescribed today.   3. Messaged HH to exchanged SPT prior to surgery.  Written orders done in [employer] faxed to her home healt.      Darek Gray MD, Urology Resident    Patient was seen and examined.  Plan as above.

## 2022-06-23 NOTE — H&P (VIEW-ONLY)
CHIEF COMPLAINT:    Mrs. Hawley is a 66 y.o. female presenting for a follow up for management of SPT    PRESENTING ILLNESS:    Tasha Hawley is a 66 y.o. female who complains that the suprapubic tube is excessively leaking.  No fevers or chills.  However, she intermittently does not feel well enough to leave her bed, having headaches, the diarrhea or feels debilitated. She was seen in the hospital on 4/14/2022 for dehydration, generalized body aches particularly with lower back pain. Today she is very delirious. She was unable to sleep last night. She stating the botox alleviated the leakage around the SPT until 2 weeks ago. She now has continuous urinary leakage. She currently has a 20 Fr SPT in place.    Previously managed with botox 200u. Last done 11/2021. She would like botox again.    REVIEW OF SYSTEMS:    Review of Systems   Constitutional: Negative.    HENT: Negative.    Eyes: Negative.    Respiratory: Negative.    Cardiovascular: Negative.    Gastrointestinal: Positive for diarrhea.   Genitourinary:        Pelvic pressure   Musculoskeletal: Positive for back pain.   Skin: Negative.    Neurological: Positive for headaches.   Endo/Heme/Allergies: Negative.    Psychiatric/Behavioral: Negative.        PATIENT HISTORY:    Past Medical History:   Diagnosis Date    Anticoagulant long-term use     Anxiety     Arthritis     Bilateral lower extremity edema     severe chronic    Carotid artery occlusion     Cataract     CHF (congestive heart failure)     Coronary artery disease     subtotalled LAD with collateral    Depression     Fever blister     Hypothyroid     Iron deficiency anemia     Lumbar radiculopathy     with chronic pain    Ocular migraine     Renal disorder     Sleep apnea     cpap       Past Surgical History:   Procedure Laterality Date    ADENOIDECTOMY      BACK SURGERY      x 12    CARDIAC CATHETERIZATION  2016    subtotalled LAD with right to left collaterals    CATARACT  EXTRACTION W/  INTRAOCULAR LENS IMPLANT Left     Dr Coleman     CYSTOSCOPIC LITHOLAPAXY N/A 6/27/2019    Procedure: CYSTOLITHOLAPAXY;  Surgeon: Shireen Mayo MD;  Location: [facility] OR 2ND FLR;  Service: Urology;  Laterality: N/A;    CYSTOSCOPIC LITHOLAPAXY N/A 9/3/2019    Procedure: CYSTOLITHOLAPAXY;  Surgeon: Shireen Mayo MD;  Location: [facility] OR 2ND FLR;  Service: Urology;  Laterality: N/A;    CYSTOSCOPY N/A 7/13/2021    Procedure: CYSTOSCOPY;  Surgeon: Shireen Mayo MD;  Location: [facility] OR 1ST FLR;  Service: Urology;  Laterality: N/A;    CYSTOSCOPY  11/16/2021    Procedure: CYSTOSCOPY;  Surgeon: Shireen Mayo MD;  Location: [facility] OR 1ST FLR;  Service: Urology;;    ESOPHAGOGASTRODUODENOSCOPY N/A 5/23/2018    Procedure: ESOPHAGOGASTRODUODENOSCOPY (EGD);  Surgeon: Prince Vance MD;  Location: [facility] (4TH FLR);  Service: Endoscopy;  Laterality: N/A;  r/s 'd per Dr. Vance due to family emergency- ER    HYSTERECTOMY  1975    endometriosis    INJECTION OF BOTULINUM TOXIN TYPE A  7/13/2021    Procedure: INJECTION, BOTULINUM TOXIN, 200units;  Surgeon: Shireen Mayo MD;  Location: [facility] OR 1ST FLR;  Service: Urology;;    INJECTION OF BOTULINUM TOXIN TYPE A  11/16/2021    Procedure: INJECTION, BOTULINUM TOXIN, 200units;  Surgeon: Shireen Mayo MD;  Location: [facility] OR 1ST FLR;  Service: Urology;;    pain pump placement      SQ Dilaudid Pump managed by Dr. Hillman, Pain Management    REPLACEMENT OF CATHETER N/A 10/31/2019    Procedure: REPLACEMENT, CATHETER-SUPRAPUBIC;  Surgeon: Shireen Mayo MD;  Location: [facility] OR 1ST FLR;  Service: Urology;  Laterality: N/A;    SPINAL CORD STIMULATOR REMOVAL      before [place]    SPINE SURGERY  5-13-13    CERVICAL FUSION    TONSILLECTOMY         Family History   Problem Relation Age of Onset    Cancer Mother 55        breast    Cancer Father         esophagus,had laryngectomy    Esophageal cancer Father     Parkinsonism Maternal Grandmother      Tremor Maternal Grandmother     No Known Problems Brother     No Known Problems Brother     Heart disease Maternal Uncle     Colon cancer Maternal Uncle         Less than 60    No Known Problems Sister     No Known Problems Maternal Aunt     Cirrhosis Paternal Aunt         ETOH    Liver disease Paternal Aunt         ETOH    Liver disease Paternal Uncle         ETOH    Cirrhosis Paternal Uncle         ETOH       Social History     Socioeconomic History    Marital status:    Tobacco Use    Smoking status: Never Smoker    Smokeless tobacco: Never Used   Substance and Sexual Activity    Alcohol use: Never    Drug use: No    Sexual activity: Never     Partners: Male     Social Determinants of Health     Financial Resource Strain: Low Risk     Difficulty of Paying Living Expenses: Not very hard   Food Insecurity: No Food Insecurity    Worried About Running Out of Food in the Last Year: Never true    Ran Out of Food in the Last Year: Never true   Transportation Needs: No Transportation Needs    Lack of Transportation (Medical): No    Lack of Transportation (Non-Medical): No   Physical Activity: Inactive    Days of Exercise per Week: 0 days    Minutes of Exercise per Session: 0 min   Housing Stability: Low Risk     Unable to Pay for Housing in the Last Year: No    Number of Places Lived in the Last Year: 1    Unstable Housing in the Last Year: No       Allergies:  (d)-limonene flavor; Bactrim [sulfamethoxazole-trimethoprim]; Benadryl [diphenhydramine hcl]; Fentanyl; Imitrex [sumatriptan succinate]; Topamax [topiramate]; Vancomycin; Butorphanol tartrate; Darvocet a500 [propoxyphene n-acetaminophen]; White petrolatum-zinc; Zinc oxide-white petrolatum; Latex, natural rubber; and Phenytoin    Medications:  Outpatient Encounter Medications as of 6/23/2022   Medication Sig Dispense Refill    acyclovir 5% (ZOVIRAX) 5 % ointment Apply topically 5 (five) times daily. 15 g 0    atorvastatin  (LIPITOR) 80 MG tablet TAKE ONE TABLET BY MOUTH EVERY DAY 90 tablet 3    butalbital-acetaminophen-caffeine -40 mg (FIORICET, ESGIC) -40 mg per tablet Take 1 tablet by mouth daily as needed. 15 tablet 0    FLUoxetine 20 MG capsule Take 3 capsules (60 mg total) by mouth once daily. 270 capsule 1    HYDROcodone-acetaminophen (NORCO)  mg per tablet       intrathecal pain pump compound Hydromorphone (7.5 mg/mL) infusion at 3.6799 mg/day (0.1533 mg/hr) out of a total reservoir volume of 37.3 mL  Pump filled every 2 months      levothyroxine (SYNTHROID) 125 MCG tablet Take 1 tablet (125 mcg total) by mouth before breakfast. 90 tablet 3    lidocaine (LIDODERM) 5 % daily as needed.       pantoprazole (PROTONIX) 40 MG tablet TAKE ONE TABLET BY MOUTH EVERY DAY 90 tablet 3    potassium chloride SA (K-DUR,KLOR-CON) 20 MEQ tablet Take 1 tablet (20 mEq total) by mouth 2 (two) times daily. 180 tablet 3    promethazine (PHENERGAN) 25 MG tablet Take 25 mg by mouth every 6 (six) hours as needed for Nausea.      QUEtiapine (SEROQUEL) 100 MG Tab TAKE 2 TABLETS (200 MG) BY MOUTH NIGHTLY 180 tablet 3    senna (SENNA LAX) 8.6 mg tablet Take 2 tablets by mouth 2 (two) times daily.      traZODone (DESYREL) 100 MG tablet Take 3 tablets (300 mg total) by mouth every evening. 270 tablet 1    [DISCONTINUED] oxybutynin (DITROPAN XL) 15 MG TR24 Take 1 tablet (15 mg total) by mouth once daily. 90 tablet 3    aspirin (ECOTRIN) 81 MG EC tablet Take 1 tablet (81 mg total) by mouth once daily.  0    nitroGLYCERIN (NITROSTAT) 0.4 MG SL tablet Place 1 tablet (0.4 mg total) under the tongue every 5 (five) minutes as needed for Chest pain. 25 tablet 3    vibegron (GEMTESA) 75 mg Tab Take 75 tablets by mouth Daily. 30 tablet 3    [DISCONTINUED] torsemide (DEMADEX) 100 MG Tab TAKE ONE TABLET BY MOUTH EVERY DAY 90 tablet 0     No facility-administered encounter medications on file as of 6/23/2022.         PHYSICAL  EXAMINATION:    The patient generally sounds in good health, is appropriately interactive, and is in no apparent distress.    Mental: Cooperative with normal affect.    Chest:  normal inspiratory effort.    Abdomen: SPT in place with minimal erythema        LABS:    Lab Results   Component Value Date    BUN 5 (L) 04/29/2022    CREATININE 1.0 04/29/2022     She had a recent culture with an   5/12/2022 ESBL E coli.    4/15/2022 staph aureas   2/3/2022 candida  1/27/2022 pseudomonas fluorescens/putida    IMPRESSION:    Encounter Diagnoses   Name Primary?    Neurogenic bladder Yes    Mixed stress and urge urinary incontinence     Recurrent UTI     Encounter for suprapubic catheter care        PLAN:    1. Schedule for 300u Botox and Macroplastique on 7/19.  Consent signed after discussing risks per the consent  2. Gemtezsa prescribed today.   3. Messaged HH to exchanged SPT prior to surgery.  Written orders done in [employer] faxed to her home healt.      Darek Gray MD, Urology Resident    Patient was seen and examined.  Plan as above.

## 2022-06-23 NOTE — LETTER
June 23, 2022    Tasha Hawley  [address]  [place] [address]             [facility] [address]  [address]  [place] [address]  Phone: [phone] [facility]    Orders:  Patient is having a urologic procedure on 7/19/2022.  Please go to her home on July 7 or 8th and exchange the suprapubic tube to a new 20 Fr 30 cc balloon.  Send a urine specimen from the new catheter and fax the results to [phone].      If you have any questions please don't hesitate to call at [phone]..    Sincerely,        Shireen Mayo MD

## 2022-07-11 ENCOUNTER — TELEPHONE (OUTPATIENT)
Dept: UROLOGY | Facility: CLINIC | Age: 67
End: 2022-07-11
Payer: MEDICARE

## 2022-07-11 DIAGNOSIS — N30.90 BLADDER INFECTION: Primary | ICD-10-CM

## 2022-07-11 RX ORDER — NITROFURANTOIN 25; 75 MG/1; MG/1
100 CAPSULE ORAL 2 TIMES DAILY
Qty: 14 CAPSULE | Refills: 0 | Status: SHIPPED | OUTPATIENT
Start: 2022-07-11 | End: 2022-07-18

## 2022-07-11 NOTE — TELEPHONE ENCOUNTER
Patient with an ESBL E coli.  Only oral it is sensitive to is Macrobid.  Sent to [facility] pharmacy

## 2022-07-12 ENCOUNTER — TELEPHONE (OUTPATIENT)
Dept: UROLOGY | Facility: CLINIC | Age: 67
End: 2022-07-12
Payer: MEDICARE

## 2022-07-12 NOTE — TELEPHONE ENCOUNTER
pts  said she could not make appointment and he declined rescheduling for this friday. He states she will call to reschedule.

## 2022-07-18 ENCOUNTER — EXTERNAL HOME HEALTH (OUTPATIENT)
Dept: HOME HEALTH SERVICES | Facility: HOSPITAL | Age: 67
End: 2022-07-18

## 2022-07-18 ENCOUNTER — ANESTHESIA EVENT (OUTPATIENT)
Dept: SURGERY | Facility: HOSPITAL | Age: 67
End: 2022-07-18
Payer: MEDICARE

## 2022-07-18 ENCOUNTER — TELEPHONE (OUTPATIENT)
Dept: UROLOGY | Facility: CLINIC | Age: 67
End: 2022-07-18
Payer: MEDICARE

## 2022-07-18 NOTE — PRE-PROCEDURE INSTRUCTIONS
PREOP INSTRUCTIONS TO PATIENT'S :  No food,milk or milk products for 8 hours before surgery.  Clear liquids like water,gatorade,apple juice are allowed up until 2 hours before surgery.  Instructed to follow the surgeon's instructions if they differ from these.  Shower instructions as well as directions to the Surgery Center were given.  Encouraged to wear loose fitting,comfortable clothing.  Medication instructions for pm prior to and am of procedure reviewed.  Instructed to avoid taking vitamins,supplements,aspirin and ibuprofen the morning of surgery.    Patient's  denies patient having any side effects or issues with anesthesia or sedation.

## 2022-07-18 NOTE — TELEPHONE ENCOUNTER
Called pt to confirm arrival time of 830am for procedure on 7/19/2022. Gave pt NPO instructions and gave pt opportunity to ask questions. Pt verbalized understanding.     Pt was informed that only 1 person would be allowed to accompany them the morning of surgery.  Pt verbalized understanding.

## 2022-07-19 ENCOUNTER — ANESTHESIA (OUTPATIENT)
Dept: SURGERY | Facility: HOSPITAL | Age: 67
End: 2022-07-19
Payer: MEDICARE

## 2022-07-19 ENCOUNTER — HOSPITAL ENCOUNTER (OUTPATIENT)
Facility: HOSPITAL | Age: 67
Discharge: HOME | End: 2022-07-19
Attending: UROLOGY | Admitting: UROLOGY
Payer: MEDICARE

## 2022-07-19 VITALS
BODY MASS INDEX: 31.76 KG/M2 | TEMPERATURE: 98 F | OXYGEN SATURATION: 95 % | HEART RATE: 60 BPM | RESPIRATION RATE: 16 BRPM | SYSTOLIC BLOOD PRESSURE: 124 MMHG | WEIGHT: 185 LBS | DIASTOLIC BLOOD PRESSURE: 58 MMHG

## 2022-07-19 DIAGNOSIS — N31.9 NEUROGENIC BLADDER: ICD-10-CM

## 2022-07-19 PROCEDURE — 63600175 PHARM REV CODE 636 W HCPCS: Performed by: STUDENT IN AN ORGANIZED HEALTH CARE EDUCATION/TRAINING PROGRAM

## 2022-07-19 PROCEDURE — D9220A PRA ANESTHESIA: Mod: CRNA,,, | Performed by: STUDENT IN AN ORGANIZED HEALTH CARE EDUCATION/TRAINING PROGRAM

## 2022-07-19 PROCEDURE — D9220A PRA ANESTHESIA: ICD-10-PCS | Mod: CRNA,,, | Performed by: STUDENT IN AN ORGANIZED HEALTH CARE EDUCATION/TRAINING PROGRAM

## 2022-07-19 PROCEDURE — 36000707: Performed by: UROLOGY

## 2022-07-19 PROCEDURE — 51715 PR ENDOSCOPIC INJECTION/IMPLANT: ICD-10-PCS | Mod: ,,, | Performed by: UROLOGY

## 2022-07-19 PROCEDURE — D9220A PRA ANESTHESIA: ICD-10-PCS | Mod: ANES,,, | Performed by: ANESTHESIOLOGY

## 2022-07-19 PROCEDURE — 51705 CHANGE OF BLADDER TUBE: CPT | Mod: 51,,, | Performed by: UROLOGY

## 2022-07-19 PROCEDURE — 36000706: Performed by: UROLOGY

## 2022-07-19 PROCEDURE — 52287 PR CYSTOURETHROSCOPY WITH INJ FOR CHEMODENERVATION: ICD-10-PCS | Mod: 59,,, | Performed by: UROLOGY

## 2022-07-19 PROCEDURE — 37000008 HC ANESTHESIA 1ST 15 MINUTES: Performed by: UROLOGY

## 2022-07-19 PROCEDURE — 63600175 PHARM REV CODE 636 W HCPCS: Mod: JG | Performed by: UROLOGY

## 2022-07-19 PROCEDURE — 71000015 HC POSTOP RECOV 1ST HR: Performed by: UROLOGY

## 2022-07-19 PROCEDURE — D9220A PRA ANESTHESIA: Mod: ANES,,, | Performed by: ANESTHESIOLOGY

## 2022-07-19 PROCEDURE — L8606 SYNTHETIC IMPLNT URINARY 1ML: HCPCS | Performed by: UROLOGY

## 2022-07-19 PROCEDURE — 71000044 HC DOSC ROUTINE RECOVERY FIRST HOUR: Performed by: UROLOGY

## 2022-07-19 PROCEDURE — 37000009 HC ANESTHESIA EA ADD 15 MINS: Performed by: UROLOGY

## 2022-07-19 PROCEDURE — 25000003 PHARM REV CODE 250: Performed by: STUDENT IN AN ORGANIZED HEALTH CARE EDUCATION/TRAINING PROGRAM

## 2022-07-19 PROCEDURE — 51705 PR CHANGE OF BLADDER TUBE,SIMPLE: ICD-10-PCS | Mod: 51,,, | Performed by: UROLOGY

## 2022-07-19 PROCEDURE — 52287 CYSTOSCOPY CHEMODENERVATION: CPT | Mod: 59,,, | Performed by: UROLOGY

## 2022-07-19 PROCEDURE — 51715 ENDOSCOPIC INJECTION/IMPLANT: CPT | Mod: ,,, | Performed by: UROLOGY

## 2022-07-19 DEVICE — IMPLANT MACROPLASTIQUE: Type: IMPLANTABLE DEVICE | Site: URETHRA | Status: FUNCTIONAL

## 2022-07-19 RX ORDER — MIDAZOLAM HYDROCHLORIDE 5 MG/ML
INJECTION INTRAMUSCULAR; INTRAVENOUS
Status: DISCONTINUED | OUTPATIENT
Start: 2022-07-19 | End: 2022-07-19

## 2022-07-19 RX ORDER — PROPOFOL 10 MG/ML
VIAL (ML) INTRAVENOUS
Status: DISCONTINUED | OUTPATIENT
Start: 2022-07-19 | End: 2022-07-19

## 2022-07-19 RX ORDER — PROPOFOL 10 MG/ML
VIAL (ML) INTRAVENOUS CONTINUOUS PRN
Status: DISCONTINUED | OUTPATIENT
Start: 2022-07-19 | End: 2022-07-19

## 2022-07-19 RX ORDER — NITROFURANTOIN 25; 75 MG/1; MG/1
100 CAPSULE ORAL 2 TIMES DAILY
Qty: 6 CAPSULE | Refills: 0 | Status: SHIPPED | OUTPATIENT
Start: 2022-07-19 | End: 2022-07-22

## 2022-07-19 RX ORDER — ONDANSETRON 2 MG/ML
INJECTION INTRAMUSCULAR; INTRAVENOUS
Status: DISCONTINUED | OUTPATIENT
Start: 2022-07-19 | End: 2022-07-19

## 2022-07-19 RX ORDER — EPHEDRINE SULFATE 50 MG/ML
INJECTION, SOLUTION INTRAVENOUS
Status: DISCONTINUED | OUTPATIENT
Start: 2022-07-19 | End: 2022-07-19

## 2022-07-19 RX ORDER — KETAMINE HCL IN 0.9 % NACL 50 MG/5 ML
SYRINGE (ML) INTRAVENOUS
Status: DISCONTINUED | OUTPATIENT
Start: 2022-07-19 | End: 2022-07-19

## 2022-07-19 RX ORDER — LIDOCAINE HYDROCHLORIDE 10 MG/ML
1 INJECTION, SOLUTION EPIDURAL; INFILTRATION; INTRACAUDAL; PERINEURAL ONCE
Status: DISCONTINUED | OUTPATIENT
Start: 2022-07-19 | End: 2022-07-19 | Stop reason: HOSPADM

## 2022-07-19 RX ORDER — PHENYLEPHRINE HYDROCHLORIDE 10 MG/ML
INJECTION INTRAVENOUS
Status: DISCONTINUED | OUTPATIENT
Start: 2022-07-19 | End: 2022-07-19

## 2022-07-19 RX ORDER — MEROPENEM AND SODIUM CHLORIDE 1 G/50ML
1 INJECTION, SOLUTION INTRAVENOUS
Status: COMPLETED | OUTPATIENT
Start: 2022-07-19 | End: 2022-07-19

## 2022-07-19 RX ADMIN — PHENYLEPHRINE HYDROCHLORIDE 100 MCG: 10 INJECTION INTRAVENOUS at 09:07

## 2022-07-19 RX ADMIN — MEROPENEM AND SODIUM CHLORIDE 2 G: 1 INJECTION, SOLUTION INTRAVENOUS at 09:07

## 2022-07-19 RX ADMIN — ONDANSETRON 4 MG: 2 INJECTION INTRAMUSCULAR; INTRAVENOUS at 10:07

## 2022-07-19 RX ADMIN — EPHEDRINE SULFATE 10 MG: 50 INJECTION INTRAVENOUS at 10:07

## 2022-07-19 RX ADMIN — EPHEDRINE SULFATE 10 MG: 50 INJECTION INTRAVENOUS at 09:07

## 2022-07-19 RX ADMIN — SODIUM CHLORIDE, SODIUM GLUCONATE, SODIUM ACETATE, POTASSIUM CHLORIDE, MAGNESIUM CHLORIDE, SODIUM PHOSPHATE, DIBASIC, AND POTASSIUM PHOSPHATE: .53; .5; .37; .037; .03; .012; .00082 INJECTION, SOLUTION INTRAVENOUS at 09:07

## 2022-07-19 RX ADMIN — Medication 10 MG: at 09:07

## 2022-07-19 RX ADMIN — MIDAZOLAM 2 MG: 5 INJECTION INTRAMUSCULAR; INTRAVENOUS at 09:07

## 2022-07-19 RX ADMIN — Medication 40 MG: at 09:07

## 2022-07-19 RX ADMIN — MEROPENEM AND SODIUM CHLORIDE 1 G: 1 INJECTION, SOLUTION INTRAVENOUS at 08:07

## 2022-07-19 RX ADMIN — GLYCOPYRROLATE 0.2 MG: 0.2 INJECTION, SOLUTION INTRAMUSCULAR; INTRAVITREAL at 09:07

## 2022-07-19 RX ADMIN — Medication 20 MG: at 09:07

## 2022-07-19 RX ADMIN — PROPOFOL 100 MCG/KG/MIN: 10 INJECTION, EMULSION INTRAVENOUS at 09:07

## 2022-07-19 NOTE — ANESTHESIA PREPROCEDURE EVALUATION
07/19/2022  Tasha Hawley is a 66 y.o., female.  Pre-operative evaluation for Procedure(s) (LRB):  CYSTOSCOPY (N/A)  CYSTOSCOPY, WITH PERIURETHRAL BULKING AGENT INJECTION-MACROPLASTIQUE  INJECTION, BOTULINUM TOXIN, 300 units   EXCHANGE, SUPRAPUBIC CATHETER    Tasha Hawley is a 66 y.o. female     Patient Active Problem List   Diagnosis    Generalized anxiety disorder    Sleep apnea    Iron deficiency anemia    Major depressive disorder, recurrent, mild    Chronic pain syndrome    Cervical myelopathy    Narcotic dependency, continuous    CHF (congestive heart failure)    Thoracic aorta atherosclerosis    Drug-induced constipation    GERD (gastroesophageal reflux disease)    Coronary artery disease of native artery of native heart with stable angina pectoris    Neurogenic bladder    Urinary retention    Frequent falls    Primary hypothyroidism    Lymphedema of both lower extremities    Scoliosis deformity of spine    S/P insertion of spinal cord stimulator    S/P insertion of intrathecal pump    Paresthesia of both lower extremities    Degenerative disc disease, lumbar    Osteoarthritis of spine with radiculopathy, lumbar region    Facet arthropathy, lumbar    Bradycardia    Bilateral carotid artery disease    Insomnia due to medical condition    Suprapubic catheter    Ischemic cardiomyopathy    Esophageal dysphagia    Recurrent UTI    CARITO (acute kidney injury)    Dyspnea    Costochondritis    Mixed stress and urge urinary incontinence    Encounter for suprapubic catheter care    Inflammatory spondylopathy of lumbar region    Chronic diastolic heart failure    Partial small bowel obstruction    Constipation due to opioid therapy    Pain in both lower legs    Urinary tract infection associated with cystostomy catheter    Impaired functional mobility and  activity tolerance    Weakness    History of stroke with residual deficit    Tremor    CAP (community acquired pneumonia)    Cellulitis    Hyponatremia    Anxiety    Abdominal pain    Staph aureus infection    Class 1 obesity due to excess calories in adult       Review of patient's allergies indicates:   Allergen Reactions    (d)-limonene flavor      Other reaction(s): difficult intubation  Other reaction(s): Difficulty breathing    Bactrim [sulfamethoxazole-trimethoprim] Anaphylaxis    Benadryl [diphenhydramine hcl] Shortness Of Breath    Fentanyl Itching, Nausea And Vomiting and Swelling             Imitrex [sumatriptan succinate] Shortness Of Breath    Topamax [topiramate] Shortness Of Breath    Vancomycin Shortness Of Breath     Rash    Butorphanol tartrate     Darvocet a500 [propoxyphene n-acetaminophen]      Other reaction(s): Difficulty breathing    White petrolatum-zinc     Zinc oxide-white petrolatum      Other reaction(s): Difficulty breathing    Latex, natural rubber Itching and Rash    Phenytoin Rash and Other (See Comments)     Trouble breathing       No current facility-administered medications on file prior to encounter.     Current Outpatient Medications on File Prior to Encounter   Medication Sig Dispense Refill    aspirin (ECOTRIN) 81 MG EC tablet Take 1 tablet (81 mg total) by mouth once daily.  0    atorvastatin (LIPITOR) 80 MG tablet TAKE ONE TABLET BY MOUTH EVERY DAY (Patient taking differently: Take by mouth every evening.) 90 tablet 3    butalbital-acetaminophen-caffeine -40 mg (FIORICET, ESGIC) -40 mg per tablet Take 1 tablet by mouth daily as needed. 15 tablet 0    FLUoxetine 20 MG capsule Take 3 capsules (60 mg total) by mouth once daily. 270 capsule 1    HYDROcodone-acetaminophen (NORCO)  mg per tablet Take by mouth every 24 hours as needed.      levothyroxine (SYNTHROID) 125 MCG tablet Take 1 tablet (125 mcg total) by mouth before  breakfast. 90 tablet 3    pantoprazole (PROTONIX) 40 MG tablet TAKE ONE TABLET BY MOUTH EVERY DAY (Patient taking differently: Take by mouth every morning.) 90 tablet 3    potassium chloride SA (K-DUR,KLOR-CON) 20 MEQ tablet Take 1 tablet (20 mEq total) by mouth 2 (two) times daily. 180 tablet 3    QUEtiapine (SEROQUEL) 100 MG Tab TAKE 2 TABLETS (200 MG) BY MOUTH NIGHTLY 180 tablet 3    senna (SENNA LAX) 8.6 mg tablet Take 2 tablets by mouth 2 (two) times daily.      traZODone (DESYREL) 100 MG tablet Take 3 tablets (300 mg total) by mouth every evening. 270 tablet 1    vibegron (GEMTESA) 75 mg Tab Take 1 tablet by mouth Daily. 30 tablet 3    acyclovir 5% (ZOVIRAX) 5 % ointment Apply topically 5 (five) times daily. 15 g 0    intrathecal pain pump compound Hydromorphone (7.5 mg/mL) infusion at 3.6799 mg/day (0.1533 mg/hr) out of a total reservoir volume of 37.3 mL  Pump filled every 2 months      lidocaine (LIDODERM) 5 % daily as needed.       nitroGLYCERIN (NITROSTAT) 0.4 MG SL tablet Place 1 tablet (0.4 mg total) under the tongue every 5 (five) minutes as needed for Chest pain. 25 tablet 3    promethazine (PHENERGAN) 25 MG tablet Take 25 mg by mouth every 6 (six) hours as needed for Nausea.      [DISCONTINUED] torsemide (DEMADEX) 100 MG Tab TAKE ONE TABLET BY MOUTH EVERY DAY 90 tablet 0       Past Surgical History:   Procedure Laterality Date    ADENOIDECTOMY      BACK SURGERY      x 12    CARDIAC CATHETERIZATION  2016    subtotalled LAD with right to left collaterals    CATARACT EXTRACTION W/  INTRAOCULAR LENS IMPLANT Left     Dr Coleman     CYSTOSCOPIC LITHOLAPAXY N/A 6/27/2019    Procedure: CYSTOLITHOLAPAXY;  Surgeon: Shireen Mayo MD;  Location: [facility] OR [address] [place] [address];  Service: Urology;  Laterality: N/A;    CYSTOSCOPIC LITHOLAPAXY N/A 9/3/2019    Procedure: CYSTOLITHOLAPAXY;  Surgeon: Shireen Mayo MD;  Location: [facility] OR [address] [place] [address];  Service: Urology;  Laterality: N/A;    CYSTOSCOPY N/A  7/13/2021    Procedure: CYSTOSCOPY;  Surgeon: Shireen Mayo MD;  [facility];  Service: Urology;  Laterality: N/A;    CYSTOSCOPY  11/16/2021    Procedure: CYSTOSCOPY;  Surgeon: Shireen Mayo MD;  [facility];  Service: Urology;;    ESOPHAGOGASTRODUODENOSCOPY N/A 5/23/2018    Procedure: ESOPHAGOGASTRODUODENOSCOPY (EGD);  Surgeon: Prince Vance MD;  Location: [facility] (4TH FLR);  Service: Endoscopy;  Laterality: N/A;  r/s 'd per Dr. Vance due to family emergency- ER    HYSTERECTOMY  1975    endometriosis    INJECTION OF BOTULINUM TOXIN TYPE A  7/13/2021    Procedure: INJECTION, BOTULINUM TOXIN, 200units;  Surgeon: Shireen Mayo MD;  [facility];  Service: Urology;;    INJECTION OF BOTULINUM TOXIN TYPE A  11/16/2021    Procedure: INJECTION, BOTULINUM TOXIN, 200units;  Surgeon: Shireen Mayo MD;  [facility];  Service: Urology;;    pain pump placement      SQ Dilaudid Pump managed by Dr. Hillman, Pain Management    REPLACEMENT OF CATHETER N/A 10/31/2019    Procedure: REPLACEMENT, CATHETER-SUPRAPUBIC;  Surgeon: Shireen Mayo MD;  [facility];  Service: Urology;  Laterality: N/A;    SPINAL CORD STIMULATOR REMOVAL      before [place]    SPINE SURGERY  5-13-13    CERVICAL FUSION    TONSILLECTOMY         Social History     Socioeconomic History    Marital status:    Tobacco Use    Smoking status: Never Smoker    Smokeless tobacco: Never Used   Substance and Sexual Activity    Alcohol use: Never    Drug use: No    Sexual activity: Never     Partners: Male     Social Determinants of Health     Financial Resource Strain: Low Risk     Difficulty of Paying Living Expenses: Not very hard   Food Insecurity: No Food Insecurity    Worried About Running Out of Food in the Last Year: Never true    Ran Out of Food in the Last Year: Never true   Transportation Needs: No Transportation Needs    Lack of  Transportation (Medical): No    Lack of Transportation (Non-Medical): No   Physical Activity: Inactive    Days of Exercise per Week: 0 days    Minutes of Exercise per Session: 0 min   Housing Stability: Low Risk     Unable to Pay for Housing in the Last Year: No    Number of Places Lived in the Last Year: 1    Unstable Housing in the Last Year: No             Pre-op Assessment    I have reviewed the Patient Summary Reports.     I have reviewed the Nursing Notes.    I have reviewed the Medications.     Review of Systems  Anesthesia Hx:  No problems with previous Anesthesia  History of prior surgery of interest to airway management or planning: Denies Family Hx of Anesthesia complications.   Denies Personal Hx of Anesthesia complications.   Social:  Non-Smoker    Hematology/Oncology:  Hematology Normal   Oncology Normal     EENT/Dental:EENT/Dental Normal   Cardiovascular:   CHF    Pulmonary:   Shortness of breath Sleep Apnea    Renal/:   Chronic Renal Disease    Hepatic/GI:   GERD    Musculoskeletal:   Arthritis     Neurological:  Neurology Normal    Endocrine:   Hypothyroidism    Psych:  Psychiatric Normal           Physical Exam  General: Well nourished and Cooperative    Airway:  Mallampati: II   Mouth Opening: Normal  TM Distance: Normal  Tongue: Normal  Neck ROM: Normal ROM    Dental:  Intact    Chest/Lungs:  Clear to auscultation, Normal Respiratory Rate    Heart:  Rate: Normal  Rhythm: Regular Rhythm  Sounds: Normal        Anesthesia Plan  Type of Anesthesia, risks & benefits discussed:    Anesthesia Type: Gen ETT, Gen Natural Airway  Intra-op Monitoring Plan: Standard ASA Monitors  Post Op Pain Control Plan: multimodal analgesia  Induction:  IV  Airway Plan: , Post-Induction  Informed Consent: Informed consent signed with the Patient representative and all parties understand the risks and agree with anesthesia plan.  All questions answered.   ASA Score: 3  Day of Surgery Review of History & Physical:  H&P Update referred to the surgeon/provider.    Ready For Surgery From Anesthesia Perspective.     .

## 2022-07-19 NOTE — ANESTHESIA POSTPROCEDURE EVALUATION
Anesthesia Post Evaluation    Patient: Tasha Hawley    Procedure(s) Performed: Procedure(s) (LRB):  CYSTOSCOPY (N/A)  CYSTOSCOPY, WITH PERIURETHRAL BULKING AGENT INJECTION-MACROPLASTIQUE  INJECTION, BOTULINUM TOXIN, 300 units   EXCHANGE, SUPRAPUBIC CATHETER    Final Anesthesia Type: general      Patient location during evaluation: PACU  Patient participation: Yes- Able to Participate  Level of consciousness: awake and alert  Post-procedure vital signs: reviewed and stable  Pain management: adequate  Airway patency: patent    PONV status at discharge: No PONV  Anesthetic complications: no      Cardiovascular status: blood pressure returned to baseline  Respiratory status: unassisted, spontaneous ventilation and room air  Hydration status: euvolemic  Follow-up not needed.          Vitals Value Taken Time   /58 07/19/22 1103   Temp 36.7 °C (98.1 °F) 07/19/22 1025   Pulse 59 07/19/22 1103   Resp 13 07/19/22 1034   SpO2 95 % 07/19/22 1103   Vitals shown include unvalidated device data.      No case tracking events are documented in the log.      Pain/Low Score: No data recorded

## 2022-07-19 NOTE — INTERVAL H&P NOTE
The patient has been examined and the H&P has been reviewed:    I concur with the findings and no changes have occurred since H&P was written.    Surgery risks, benefits and alternative options discussed and understood by patient/family.  Urine dipstick - positive for leukocytes. Negative for all remaining components.     She has been on culture appropriate Macrobid.      There are no hospital problems to display for this patient.    Patient seen in holding.  No changes in clinical condition.  Proceed with planned procedure.

## 2022-07-19 NOTE — PATIENT INSTRUCTIONS
Post Cystoscopy Instructions  Do not strain to have a bowel movement  No strenuous exercise x 7 days  No driving while you are on narcotic pain medications or if your meadows  catheter is in place    You can expect:  To pass stone fragments if you had a stone procedure  Have pain when you void from your stent if you have a stent in place  See blood in your urine if you have a stent in place    If you have a catheter, please return to the ER if your catheter stops draining or you are having abdominal pain.    Call the doctor if:  Temperature is greater than 101F  Persistent vomiting and inability to keep food down  Inability to void if you do not have a catheter

## 2022-07-19 NOTE — TRANSFER OF CARE
Anesthesia Transfer of Care Note    Patient: Tasha Hawley    Procedure(s) Performed: Procedure(s) (LRB):  CYSTOSCOPY (N/A)  CYSTOSCOPY, WITH PERIURETHRAL BULKING AGENT INJECTION-MACROPLASTIQUE  INJECTION, BOTULINUM TOXIN, 300 units   EXCHANGE, SUPRAPUBIC CATHETER    Patient location: PACU    Anesthesia Type: general    Transport from OR: Transported from OR on 6-10 L/min O2 by face mask with adequate spontaneous ventilation    Post pain: adequate analgesia    Post assessment: no apparent anesthetic complications and tolerated procedure well    Post vital signs: stable    Level of consciousness: awake    Nausea/Vomiting: no nausea/vomiting    Complications: none    Transfer of care protocol was followed      Last vitals:   Visit Vitals  BP (!) 104/55 (BP Location: Right arm, Patient Position: Lying)   Pulse 61   Temp 37 °C (98.6 °F) (Oral)   Resp 16   Wt 83.9 kg (185 lb)   LMP  (LMP Unknown)   SpO2 (!) 94%   Breastfeeding No   BMI 31.76 kg/m²

## 2022-07-19 NOTE — DISCHARGE SUMMARY
[facility] (1st Fl)  Discharge Note  Short Stay    Procedure(s) (LRB):  CYSTOSCOPY (N/A)  CYSTOSCOPY, WITH PERIURETHRAL BULKING AGENT INJECTION-MACROPLASTIQUE  INJECTION, BOTULINUM TOXIN, 300 units   EXCHANGE, SUPRAPUBIC CATHETER    OUTCOME: Patient tolerated treatment/procedure well without complication and is now ready for discharge.    DISPOSITION: Home or Self Care    FINAL DIAGNOSIS:  Neurogenic bladder    FOLLOWUP: In clinic    DISCHARGE INSTRUCTIONS:    Discharge Procedure Orders   Notify your health care provider if you experience any of the following:  temperature >100.4     Notify your health care provider if you experience any of the following:  persistent nausea and vomiting or diarrhea     Notify your health care provider if you experience any of the following:  severe uncontrolled pain     Notify your health care provider if you experience any of the following:  redness, tenderness, or signs of infection (pain, swelling, redness, odor or green/yellow discharge around incision site)     Notify your health care provider if you experience any of the following:  worsening rash     Notify your health care provider if you experience any of the following:  persistent dizziness, light-headedness, or visual disturbances        TIME SPENT ON DISCHARGE: 10 minutes    As above.

## 2022-07-19 NOTE — OP NOTE
[facility]  Operative Note    Date: 07/19/2022    Pre-Op Diagnosis: Neurogenic bladder    Post-Op Diagnosis: same    Procedure(s) Performed:   1.  Cystoscopy with bladder botox injection  2.  Periurethral bulking agent injection  3. Suprapubic tube removed and replaced     Specimen(s): none    Staff Surgeon:  Shireen Mayo MD    Assistant Surgeon: MD Ernesto Larson MD      Anesthesia: General LMA anesthesia    Indications: Tasha Hawley is a 66 y.o. female with neurogenic bladder and history of suprapubic tube    Findings: bladder lining friable with no masses or lesions. 300 U botox injected. Macroplastique injected at the 4 and 6 o'clock position. Suprapubic tube exchanged. 16 Fr meadows placed with 26 cc in    Estimated Blood Loss: min    Drains: 16 Fr meadows catheter    Procedure in Detail:  After informed consent was obtained the patient was brought to the cystoscopy suite and placed in the supine position.  SCDs were applied and working.  Anesthesia was administered.  When the patient was adequately sedated she was placed in the dorsal lithotomy position and prepped and draped in the usual sterile fashion.      A collagen injection cystoscope in a 22 Fr injection was introduced into the patients's bladder via the urethra.  This passed easily.  Formal cystoscopy was performed which revealed the ureteral orifices in their normal anatomic position bilaterally.  No bladder masses, trabeculations, stones or diverticuli were seen.      300 units of botox was injected into the detrusor muscle throughout the bladder.  Good wheals were raised.  The patient's bladder was drained and the cystoscope was removed.     An injection cystoscope in a 22 Fr injection sheath was inserted into the urethra and advanced into the urinary bladder. The Macroplastique rigid needle was inserted into the scope and the scope was backed out into the urethra. Marcroplastique was injected at the 5 and 7  o'clock regions transurethrally. Good coaptation of the urethra was seen after injection. The scope was removed from the bladder, and there was no leakage of urine from urethra with crede maneuver.     Attention turned to the SPT, 26 cc removed from balloon and meadows removed. 16 Fr meadows catheter placed with return of urine through prior spt tract. 26 cc placed in balloon.     The patient tolerated the procedure well and was transferred to the recovery room in stable condition.      Disposition:   She was given prescriptions for macrobid.  She knows to call the clinic with any further issues.      Darek Gray MD    I was present for the entire case and agree with the above note.      I was present for the entire case and agree with the above note.

## 2022-07-20 DIAGNOSIS — G47.00 INSOMNIA, UNSPECIFIED TYPE: ICD-10-CM

## 2022-07-20 RX ORDER — TRAZODONE HYDROCHLORIDE 100 MG/1
300 TABLET ORAL NIGHTLY
Qty: 270 TABLET | Refills: 0 | Status: ON HOLD | OUTPATIENT
Start: 2022-07-20 | End: 2022-07-31 | Stop reason: SDUPTHER

## 2022-07-21 ENCOUNTER — TELEPHONE (OUTPATIENT)
Dept: INTERNAL MEDICINE | Facility: CLINIC | Age: 67
End: 2022-07-21
Payer: MEDICARE

## 2022-07-21 NOTE — TELEPHONE ENCOUNTER
Dr. Hodges, I spoke to Home Health Nurse Nicolette ([phone]). She reports that pt c/o pain at a 9/10 in her back and both legs, and swelling from the knee up on both legs. She states that pt's weight today was 201lbs, which puts pt at a 19 pound gained in a couple of days. Pt's blood pressure is reading 90/60. She reports that pt is in good spirits, but thinks she should be seen as soon as possible. Pt requested for you to coordinate care with her other care team members, regarding her condition.

## 2022-07-21 NOTE — TELEPHONE ENCOUNTER
----- Message from Alisson Ttoh sent at 7/21/2022 12:06 PM CDT -----  Contact: Home Health Nurse Nicolette [phone]  Needs call back. Patient wt. Is 201 and gained 19 pounds in just a couple of days. Pressure is 90/60. Feels Patient needs to be seen as soon as possible

## 2022-07-22 ENCOUNTER — HOSPITAL ENCOUNTER (EMERGENCY)
Facility: HOSPITAL | Age: 67
Discharge: HOME | End: 2022-07-22
Attending: EMERGENCY MEDICINE
Payer: MEDICARE

## 2022-07-22 ENCOUNTER — OFFICE VISIT (OUTPATIENT)
Dept: INTERNAL MEDICINE | Facility: CLINIC | Age: 67
End: 2022-07-22
Payer: MEDICARE

## 2022-07-22 VITALS
DIASTOLIC BLOOD PRESSURE: 53 MMHG | HEART RATE: 56 BPM | WEIGHT: 185 LBS | OXYGEN SATURATION: 95 % | BODY MASS INDEX: 31.76 KG/M2 | RESPIRATION RATE: 18 BRPM | TEMPERATURE: 99 F | SYSTOLIC BLOOD PRESSURE: 98 MMHG

## 2022-07-22 VITALS
DIASTOLIC BLOOD PRESSURE: 60 MMHG | HEIGHT: 64 IN | SYSTOLIC BLOOD PRESSURE: 110 MMHG | BODY MASS INDEX: 31.76 KG/M2 | OXYGEN SATURATION: 96 % | HEART RATE: 60 BPM

## 2022-07-22 DIAGNOSIS — R63.5 WEIGHT GAIN: ICD-10-CM

## 2022-07-22 DIAGNOSIS — E03.9 PRIMARY HYPOTHYROIDISM: Chronic | ICD-10-CM

## 2022-07-22 DIAGNOSIS — H91.90 HEARING LOSS, UNSPECIFIED HEARING LOSS TYPE, UNSPECIFIED LATERALITY: ICD-10-CM

## 2022-07-22 DIAGNOSIS — R06.02 SHORTNESS OF BREATH: ICD-10-CM

## 2022-07-22 DIAGNOSIS — I50.9 ACUTE ON CHRONIC CONGESTIVE HEART FAILURE, UNSPECIFIED HEART FAILURE TYPE: Primary | ICD-10-CM

## 2022-07-22 DIAGNOSIS — R60.9 EDEMA, UNSPECIFIED TYPE: ICD-10-CM

## 2022-07-22 DIAGNOSIS — R06.00 DYSPNEA, UNSPECIFIED TYPE: Primary | ICD-10-CM

## 2022-07-22 DIAGNOSIS — E03.9 HYPOTHYROIDISM, UNSPECIFIED TYPE: ICD-10-CM

## 2022-07-22 LAB
ALBUMIN SERPL BCP-MCNC: 3.2 G/DL (ref 3.5–5.2)
ALP SERPL-CCNC: 106 U/L (ref 55–135)
ALT SERPL W/O P-5'-P-CCNC: <5 U/L (ref 10–44)
ANION GAP SERPL CALC-SCNC: 10 MMOL/L (ref 8–16)
AST SERPL-CCNC: 11 U/L (ref 10–40)
BASOPHILS # BLD AUTO: 0.01 K/UL (ref 0–0.2)
BASOPHILS NFR BLD: 0.2 % (ref 0–1.9)
BILIRUB SERPL-MCNC: 0.4 MG/DL (ref 0.1–1)
BILIRUB UR QL STRIP: NEGATIVE
BNP SERPL-MCNC: 288 PG/ML (ref 0–99)
BUN SERPL-MCNC: 7 MG/DL (ref 8–23)
CALCIUM SERPL-MCNC: 9.2 MG/DL (ref 8.7–10.5)
CHLORIDE SERPL-SCNC: 103 MMOL/L (ref 95–110)
CLARITY UR REFRACT.AUTO: CLEAR
CO2 SERPL-SCNC: 27 MMOL/L (ref 23–29)
COLOR UR AUTO: YELLOW
CREAT SERPL-MCNC: 0.8 MG/DL (ref 0.5–1.4)
DIFFERENTIAL METHOD: ABNORMAL
EOSINOPHIL # BLD AUTO: 0.3 K/UL (ref 0–0.5)
EOSINOPHIL NFR BLD: 5.4 % (ref 0–8)
ERYTHROCYTE [DISTWIDTH] IN BLOOD BY AUTOMATED COUNT: 13.9 % (ref 11.5–14.5)
EST. GFR  (AFRICAN AMERICAN): >60 ML/MIN/1.73 M^2
EST. GFR  (NON AFRICAN AMERICAN): >60 ML/MIN/1.73 M^2
GLUCOSE SERPL-MCNC: 84 MG/DL (ref 70–110)
GLUCOSE UR QL STRIP: NEGATIVE
HCT VFR BLD AUTO: 36.6 % (ref 37–48.5)
HGB BLD-MCNC: 11.3 G/DL (ref 12–16)
HGB UR QL STRIP: NEGATIVE
IMM GRANULOCYTES # BLD AUTO: 0.01 K/UL (ref 0–0.04)
IMM GRANULOCYTES NFR BLD AUTO: 0.2 % (ref 0–0.5)
KETONES UR QL STRIP: NEGATIVE
LEUKOCYTE ESTERASE UR QL STRIP: NEGATIVE
LYMPHOCYTES # BLD AUTO: 1.1 K/UL (ref 1–4.8)
LYMPHOCYTES NFR BLD: 22.7 % (ref 18–48)
MCH RBC QN AUTO: 27.4 PG (ref 27–31)
MCHC RBC AUTO-ENTMCNC: 30.9 G/DL (ref 32–36)
MCV RBC AUTO: 89 FL (ref 82–98)
MONOCYTES # BLD AUTO: 0.3 K/UL (ref 0.3–1)
MONOCYTES NFR BLD: 7.1 % (ref 4–15)
NEUTROPHILS # BLD AUTO: 3 K/UL (ref 1.8–7.7)
NEUTROPHILS NFR BLD: 64.4 % (ref 38–73)
NITRITE UR QL STRIP: NEGATIVE
NRBC BLD-RTO: 0 /100 WBC
PH UR STRIP: 8 [PH] (ref 5–8)
PLATELET # BLD AUTO: 214 K/UL (ref 150–450)
PMV BLD AUTO: 9.8 FL (ref 9.2–12.9)
POTASSIUM SERPL-SCNC: 4.1 MMOL/L (ref 3.5–5.1)
PROT SERPL-MCNC: 6.8 G/DL (ref 6–8.4)
PROT UR QL STRIP: NEGATIVE
RBC # BLD AUTO: 4.13 M/UL (ref 4–5.4)
SARS-COV-2 RDRP RESP QL NAA+PROBE: NEGATIVE
SODIUM SERPL-SCNC: 140 MMOL/L (ref 136–145)
SP GR UR STRIP: 1.01 (ref 1–1.03)
T4 FREE SERPL-MCNC: 0.69 NG/DL (ref 0.71–1.51)
TROPONIN I SERPL DL<=0.01 NG/ML-MCNC: <0.006 NG/ML (ref 0–0.03)
TSH SERPL DL<=0.005 MIU/L-ACNC: 11.05 UIU/ML (ref 0.4–4)
URN SPEC COLLECT METH UR: NORMAL
WBC # BLD AUTO: 4.66 K/UL (ref 3.9–12.7)

## 2022-07-22 PROCEDURE — 1125F AMNT PAIN NOTED PAIN PRSNT: CPT | Mod: CPTII,S$GLB,, | Performed by: INTERNAL MEDICINE

## 2022-07-22 PROCEDURE — 3078F DIAST BP <80 MM HG: CPT | Mod: CPTII,S$GLB,, | Performed by: INTERNAL MEDICINE

## 2022-07-22 PROCEDURE — 99285 EMERGENCY DEPT VISIT HI MDM: CPT | Mod: CS,,, | Performed by: EMERGENCY MEDICINE

## 2022-07-22 PROCEDURE — U0002 COVID-19 LAB TEST NON-CDC: HCPCS | Performed by: EMERGENCY MEDICINE

## 2022-07-22 PROCEDURE — 1101F PT FALLS ASSESS-DOCD LE1/YR: CPT | Mod: CPTII,S$GLB,, | Performed by: INTERNAL MEDICINE

## 2022-07-22 PROCEDURE — 99214 OFFICE O/P EST MOD 30 MIN: CPT | Mod: S$GLB,,, | Performed by: INTERNAL MEDICINE

## 2022-07-22 PROCEDURE — 84443 ASSAY THYROID STIM HORMONE: CPT | Performed by: EMERGENCY MEDICINE

## 2022-07-22 PROCEDURE — 81003 URINALYSIS AUTO W/O SCOPE: CPT | Performed by: EMERGENCY MEDICINE

## 2022-07-22 PROCEDURE — 1159F PR MEDICATION LIST DOCUMENTED IN MEDICAL RECORD: ICD-10-PCS | Mod: CPTII,S$GLB,, | Performed by: INTERNAL MEDICINE

## 2022-07-22 PROCEDURE — 3074F SYST BP LT 130 MM HG: CPT | Mod: CPTII,S$GLB,, | Performed by: INTERNAL MEDICINE

## 2022-07-22 PROCEDURE — 93010 ELECTROCARDIOGRAM REPORT: CPT | Mod: ,,, | Performed by: INTERNAL MEDICINE

## 2022-07-22 PROCEDURE — 1160F PR REVIEW ALL MEDS BY PRESCRIBER/CLIN PHARMACIST DOCUMENTED: ICD-10-PCS | Mod: CPTII,S$GLB,, | Performed by: INTERNAL MEDICINE

## 2022-07-22 PROCEDURE — 93010 EKG 12-LEAD: ICD-10-PCS | Mod: ,,, | Performed by: INTERNAL MEDICINE

## 2022-07-22 PROCEDURE — 1125F PR PAIN SEVERITY QUANTIFIED, PAIN PRESENT: ICD-10-PCS | Mod: CPTII,S$GLB,, | Performed by: INTERNAL MEDICINE

## 2022-07-22 PROCEDURE — 3074F PR MOST RECENT SYSTOLIC BLOOD PRESSURE < 130 MM HG: ICD-10-PCS | Mod: CPTII,S$GLB,, | Performed by: INTERNAL MEDICINE

## 2022-07-22 PROCEDURE — 3066F NEPHROPATHY DOC TX: CPT | Mod: CPTII,S$GLB,, | Performed by: INTERNAL MEDICINE

## 2022-07-22 PROCEDURE — 99285 PR EMERGENCY DEPT VISIT,LEVEL V: ICD-10-PCS | Mod: CS,,, | Performed by: EMERGENCY MEDICINE

## 2022-07-22 PROCEDURE — 3008F PR BODY MASS INDEX (BMI) DOCUMENTED: ICD-10-PCS | Mod: CPTII,S$GLB,, | Performed by: INTERNAL MEDICINE

## 2022-07-22 PROCEDURE — 85025 COMPLETE CBC W/AUTO DIFF WBC: CPT | Performed by: EMERGENCY MEDICINE

## 2022-07-22 PROCEDURE — 3288F FALL RISK ASSESSMENT DOCD: CPT | Mod: CPTII,S$GLB,, | Performed by: INTERNAL MEDICINE

## 2022-07-22 PROCEDURE — 3066F PR DOCUMENTATION OF TREATMENT FOR NEPHROPATHY: ICD-10-PCS | Mod: CPTII,S$GLB,, | Performed by: INTERNAL MEDICINE

## 2022-07-22 PROCEDURE — 83880 ASSAY OF NATRIURETIC PEPTIDE: CPT | Performed by: EMERGENCY MEDICINE

## 2022-07-22 PROCEDURE — 99999 PR PBB SHADOW E&M-EST. PATIENT-LVL V: ICD-10-PCS | Mod: PBBFAC,,, | Performed by: INTERNAL MEDICINE

## 2022-07-22 PROCEDURE — 93005 ELECTROCARDIOGRAM TRACING: CPT

## 2022-07-22 PROCEDURE — 80053 COMPREHEN METABOLIC PANEL: CPT | Performed by: EMERGENCY MEDICINE

## 2022-07-22 PROCEDURE — 96374 THER/PROPH/DIAG INJ IV PUSH: CPT

## 2022-07-22 PROCEDURE — 3078F PR MOST RECENT DIASTOLIC BLOOD PRESSURE < 80 MM HG: ICD-10-PCS | Mod: CPTII,S$GLB,, | Performed by: INTERNAL MEDICINE

## 2022-07-22 PROCEDURE — 99214 PR OFFICE/OUTPT VISIT, EST, LEVL IV, 30-39 MIN: ICD-10-PCS | Mod: S$GLB,,, | Performed by: INTERNAL MEDICINE

## 2022-07-22 PROCEDURE — 84484 ASSAY OF TROPONIN QUANT: CPT | Performed by: EMERGENCY MEDICINE

## 2022-07-22 PROCEDURE — 99285 EMERGENCY DEPT VISIT HI MDM: CPT | Mod: 25

## 2022-07-22 PROCEDURE — 3288F PR FALLS RISK ASSESSMENT DOCUMENTED: ICD-10-PCS | Mod: CPTII,S$GLB,, | Performed by: INTERNAL MEDICINE

## 2022-07-22 PROCEDURE — 84439 ASSAY OF FREE THYROXINE: CPT | Performed by: EMERGENCY MEDICINE

## 2022-07-22 PROCEDURE — 1101F PR PT FALLS ASSESS DOC 0-1 FALLS W/OUT INJ PAST YR: ICD-10-PCS | Mod: CPTII,S$GLB,, | Performed by: INTERNAL MEDICINE

## 2022-07-22 PROCEDURE — 1160F RVW MEDS BY RX/DR IN RCRD: CPT | Mod: CPTII,S$GLB,, | Performed by: INTERNAL MEDICINE

## 2022-07-22 PROCEDURE — 3008F BODY MASS INDEX DOCD: CPT | Mod: CPTII,S$GLB,, | Performed by: INTERNAL MEDICINE

## 2022-07-22 PROCEDURE — 63600175 PHARM REV CODE 636 W HCPCS

## 2022-07-22 PROCEDURE — 99999 PR PBB SHADOW E&M-EST. PATIENT-LVL V: CPT | Mod: PBBFAC,,, | Performed by: INTERNAL MEDICINE

## 2022-07-22 PROCEDURE — 84484 ASSAY OF TROPONIN QUANT: CPT

## 2022-07-22 PROCEDURE — 1159F MED LIST DOCD IN RCRD: CPT | Mod: CPTII,S$GLB,, | Performed by: INTERNAL MEDICINE

## 2022-07-22 RX ORDER — HYDROMORPHONE HYDROCHLORIDE 1 MG/ML
1 INJECTION, SOLUTION INTRAMUSCULAR; INTRAVENOUS; SUBCUTANEOUS ONCE
Status: COMPLETED | OUTPATIENT
Start: 2022-07-22 | End: 2022-07-22

## 2022-07-22 RX ORDER — LEVOTHYROXINE SODIUM 137 UG/1
137 TABLET ORAL
Qty: 30 TABLET | Refills: 0 | Status: ON HOLD | OUTPATIENT
Start: 2022-07-22 | End: 2022-07-31 | Stop reason: SDUPTHER

## 2022-07-22 RX ORDER — FUROSEMIDE 20 MG/1
20 TABLET ORAL DAILY
Qty: 30 TABLET | Refills: 0 | Status: ON HOLD | OUTPATIENT
Start: 2022-07-22 | End: 2022-07-31 | Stop reason: HOSPADM

## 2022-07-22 RX ADMIN — HYDROMORPHONE HYDROCHLORIDE 1 MG: 1 INJECTION, SOLUTION INTRAMUSCULAR; INTRAVENOUS; SUBCUTANEOUS at 08:07

## 2022-07-22 NOTE — PROGRESS NOTES
"Subjective:       Patient ID: Tasha Hawley is a 66 y.o. female.    Chief Complaint: Weight Gain  This is a 66-year-old who presents today concerns about weight gain she has home with home health and reports that she had an 18 lb weight gain over the last 2 days . she does not recall anything changing although has had some chronic edema issues and family member reports that she has had issues with edema intermittently he reports that she has difficulty tolerating medicines cause her blood pressure tends to drop she reports that she is feeling short of breath now and having chills recently as well denies productive cough.  She does have some hearing impairment    HPI  Review of Systems   Constitutional: Positive for chills and fatigue.   Respiratory: Positive for chest tightness and shortness of breath.    Cardiovascular: Positive for leg swelling. Negative for palpitations.       Objective:     Blood pressure 110/60, pulse 60, height 5' 4" (1.626 m), SpO2 96 %.  pt would not allow weight today reports home 180 usually and has been 202   Physical Exam  Constitutional:       General: She is not in acute distress.     Comments: Pt alert   Hearing impaired   HENT:      Head: Normocephalic.   Cardiovascular:      Rate and Rhythm: Normal rate and regular rhythm.      Heart sounds: Normal heart sounds. No murmur heard.    No friction rub. No gallop.   Pulmonary:      Effort: Pulmonary effort is normal. No respiratory distress.      Breath sounds: Normal breath sounds.      Comments: crackles  Abdominal:      General: Bowel sounds are normal.      Palpations: Abdomen is soft. There is no mass.      Tenderness: There is no abdominal tenderness.   Musculoskeletal:      Cervical back: Neck supple.      Comments: Edema legs wrapped  Edema up to thighs    Skin:     Findings: No erythema.   Neurological:      Mental Status: She is alert.         Assessment:       1. Dyspnea, unspecified type    2. Edema, unspecified type  "   3. Weight gain    4. Hearing loss, unspecified hearing loss type, unspecified laterality        Plan:       Tasha was seen today for weight gain.    Diagnoses and all orders for this visit:    Dyspnea, unspecified type  Edema, unspecified type  Weight gain    Hearing loss, unspecified hearing loss type, unspecified laterality  Hx of     Discussed with patient and family    Recommend transportation to the ER for further evaluation due to her dyspnea increasing edema and shortness of breath she was given oxygen and ambulance called to transport her to hospital

## 2022-07-22 NOTE — ED PROVIDER NOTES
Encounter Date: 7/22/2022       History     Chief Complaint   Patient presents with    Weakness     Sent from clinic. Clinic nurse reports hypotension and low spo2, pt. Is 138/74 and 96% RA. Reports weakness X3 days. Increased edema to lower legs.      Ms. Hawley is a 66 year old female with a PMH of CHF, CAD, suprapubic catheter with recurrent UTIs, chronic pain on dilaudid pump, presenting with a 3 day history of leg swelling. Leg swelling has been a chronic issue for her but this is worse than normal. She has pain bilaterally in both legs related to the swelling. She walks with a walker but has not been able to walk as much due to the pain and swelling. She states she has gained 20 pounds in the past week. Used to take lasix which helped with the swelling but has been off it for a number of months. Endorses PND, shortness of breath, and chest discomfort. Denies fever, leg sores, increased/decreased urine, syncope.         Review of patient's allergies indicates:   Allergen Reactions    (d)-limonene flavor      Other reaction(s): difficult intubation  Other reaction(s): Difficulty breathing    Bactrim [sulfamethoxazole-trimethoprim] Anaphylaxis    Benadryl [diphenhydramine hcl] Shortness Of Breath    Fentanyl Itching, Nausea And Vomiting and Swelling             Imitrex [sumatriptan succinate] Shortness Of Breath    Topamax [topiramate] Shortness Of Breath    Vancomycin Shortness Of Breath     Rash    Butorphanol tartrate     Darvocet a500 [propoxyphene n-acetaminophen]      Other reaction(s): Difficulty breathing    White petrolatum-zinc     Zinc oxide-white petrolatum      Other reaction(s): Difficulty breathing    Latex, natural rubber Itching and Rash    Phenytoin Rash and Other (See Comments)     Trouble breathing     Past Medical History:   Diagnosis Date    Anticoagulant long-term use     Anxiety     Arthritis     Bilateral lower extremity edema     severe chronic    Carotid artery  occlusion     Cataract     CHF (congestive heart failure)     Coronary artery disease     subtotalled LAD with collateral    Depression     Fever blister     Hypothyroid     Iron deficiency anemia     Lumbar radiculopathy     with chronic pain    Ocular migraine     Renal disorder     Sleep apnea     cpap     Past Surgical History:   Procedure Laterality Date    ADENOIDECTOMY      BACK SURGERY      x 12    CARDIAC CATHETERIZATION  2016    subtotalled LAD with right to left collaterals    CATARACT EXTRACTION W/  INTRAOCULAR LENS IMPLANT Left     Dr Coleman     CYSTOSCOPIC LITHOLAPAXY N/A 6/27/2019    Procedure: CYSTOLITHOLAPAXY;  Surgeon: Shireen Mayo MD;  Location: [facility] OR 2ND FLR;  Service: Urology;  Laterality: N/A;    CYSTOSCOPIC LITHOLAPAXY N/A 9/3/2019    Procedure: CYSTOLITHOLAPAXY;  Surgeon: Shireen Mayo MD;  Location: [facility] OR 2ND FLR;  Service: Urology;  Laterality: N/A;    CYSTOSCOPY N/A 7/13/2021    Procedure: CYSTOSCOPY;  Surgeon: Shireen Mayo MD;  Location: [facility] OR 1ST FLR;  Service: Urology;  Laterality: N/A;    CYSTOSCOPY  11/16/2021    Procedure: CYSTOSCOPY;  Surgeon: Shireen Mayo MD;  Location: [facility] OR 1ST FLR;  Service: Urology;;    CYSTOSCOPY  7/19/2022    Procedure: CYSTOSCOPY;  Surgeon: Shireen Mayo MD;  Location: [facility] OR 1ST FLR;  Service: Urology;;    CYSTOSCOPY WITH INJECTION OF PERIURETHRAL BULKING AGENT  7/19/2022    Procedure: CYSTOSCOPY, WITH PERIURETHRAL BULKING AGENT INJECTION-MACROPLASTIQUE;  Surgeon: Shireen Mayo MD;  Location: [facility] OR 1ST FLR;  Service: Urology;;    ESOPHAGOGASTRODUODENOSCOPY N/A 5/23/2018    Procedure: ESOPHAGOGASTRODUODENOSCOPY (EGD);  Surgeon: Prince Vance MD;  Location: [facility] ENDO (4TH FLR);  Service: Endoscopy;  Laterality: N/A;  r/s 'd per Dr. Vance due to family emergency- ER    HYSTERECTOMY  1975    endometriosis    INJECTION OF BOTULINUM TOXIN TYPE A  7/13/2021    Procedure: INJECTION, BOTULINUM  TOXIN, 200units;  Surgeon: Shireen Mayo MD;  Location: [facility] OR [address] [place] [address];  Service: Urology;;    INJECTION OF BOTULINUM TOXIN TYPE A  11/16/2021    Procedure: INJECTION, BOTULINUM TOXIN, 200units;  Surgeon: Shireen Mayo MD;  Location: [facility] OR [address] [place] [address];  Service: Urology;;    INJECTION OF BOTULINUM TOXIN TYPE A  7/19/2022    Procedure: INJECTION, BOTULINUM TOXIN, 300 units ;  Surgeon: Shireen Mayo MD;  Location: [facility] OR [address] [place] [address];  Service: Urology;;    pain pump placement      SQ Dilaudid Pump managed by Dr. Hillman, Pain Management    REPLACEMENT OF CATHETER N/A 10/31/2019    Procedure: REPLACEMENT, CATHETER-SUPRAPUBIC;  Surgeon: Shireen Mayo MD;  Location: [facility] OR [address] [place] [address];  Service: Urology;  Laterality: N/A;    SPINAL CORD STIMULATOR REMOVAL      before [place]    SPINE SURGERY  5-13-13    CERVICAL FUSION    TONSILLECTOMY       Family History   Problem Relation Age of Onset    Cancer Mother 55        breast    Cancer Father         esophagus,had laryngectomy    Esophageal cancer Father     Parkinsonism Maternal Grandmother     Tremor Maternal Grandmother     No Known Problems Brother     No Known Problems Brother     Heart disease Maternal Uncle     Colon cancer Maternal Uncle         Less than 60    No Known Problems Sister     No Known Problems Maternal Aunt     Cirrhosis Paternal Aunt         ETOH    Liver disease Paternal Aunt         ETOH    Liver disease Paternal Uncle         ETOH    Cirrhosis Paternal Uncle         ETOH    No Known Problems Maternal Grandfather     No Known Problems Paternal Grandmother     No Known Problems Paternal Grandfather     Melanoma Neg Hx     Amblyopia Neg Hx     Blindness Neg Hx     Cataracts Neg Hx     Diabetes Neg Hx     Glaucoma Neg Hx     Hypertension Neg Hx     Macular degeneration Neg Hx     Retinal detachment Neg Hx     Strabismus Neg Hx     Stroke Neg Hx     Thyroid disease Neg Hx     Celiac disease Neg Hx     Colon  polyps Neg Hx     Cystic fibrosis Neg Hx     Crohn's disease Neg Hx     Inflammatory bowel disease Neg Hx     Liver cancer Neg Hx     Rectal cancer Neg Hx     Stomach cancer Neg Hx     Ulcerative colitis Neg Hx     Lymphoma Neg Hx      Social History     Tobacco Use    Smoking status: Never Smoker    Smokeless tobacco: Never Used   Substance Use Topics    Alcohol use: Never    Drug use: No     Review of Systems   Constitutional: Positive for appetite change and fatigue. Negative for chills and fever.   HENT: Positive for congestion. Negative for sore throat.    Respiratory: Positive for cough, chest tightness and shortness of breath.    Cardiovascular: Positive for leg swelling. Negative for palpitations.   Gastrointestinal: Positive for constipation. Negative for abdominal pain, diarrhea, nausea and vomiting.   Genitourinary: Negative for decreased urine volume.   Musculoskeletal: Positive for back pain.   Skin: Negative for color change and rash.   Neurological: Negative for dizziness and syncope.       Physical Exam     Initial Vitals [07/22/22 1642]   BP Pulse Resp Temp SpO2   138/74 (!) 54 16 98.4 °F (36.9 °C) 96 %      MAP       --         Physical Exam    Constitutional:  Non-toxic appearance. No distress.   HENT:   Head: Normocephalic and atraumatic.   Eyes: Conjunctivae and EOM are normal.   Cardiovascular: Bradycardia present.  Exam reveals distant heart sounds.    No murmur heard.  Pulmonary/Chest: No respiratory distress. She has no wheezes.   Abdominal: Abdomen is soft. Bowel sounds are normal. She exhibits no distension. There is no abdominal tenderness.   Musculoskeletal:      Right lower leg: Swelling present.      Left lower leg: Swelling present.      Comments: Decreased ROM in BLE related to pain     Neurological: She is alert and oriented to person, place, and time.   Skin: Skin is warm and dry. There is erythema (erythema in lower legs).   Psychiatric: She has a normal mood and  affect. Her behavior is normal.         ED Course   Procedures  Labs Reviewed   CBC W/ AUTO DIFFERENTIAL - Abnormal; Notable for the following components:       Result Value    Hemoglobin 11.3 (*)     Hematocrit 36.6 (*)     MCHC 30.9 (*)     All other components within normal limits   COMPREHENSIVE METABOLIC PANEL - Abnormal; Notable for the following components:    BUN 7 (*)     Albumin 3.2 (*)     ALT <5 (*)     All other components within normal limits    Narrative:     ADD ON [facility] ORD#[id number] PER RN SERENE 07/22/22 @1916   B-TYPE NATRIURETIC PEPTIDE - Abnormal; Notable for the following components:     (*)     All other components within normal limits    Narrative:     ADD ON [facility] ORD#[id number] PER RN SERENE 07/22/22 @1916   TSH - Abnormal; Notable for the following components:    TSH 11.054 (*)     All other components within normal limits    Narrative:     ADD ON [facility] ORD#[id number] PER RN SERENE 07/22/22 @1916   T4, FREE - Abnormal; Notable for the following components:    Free T4 0.69 (*)     All other components within normal limits    Narrative:     ADD ON [facility] ORD#[id number] PER RN SERENE 07/22/22 @1916   TROPONIN I    Narrative:     ADD ON [facility] ORD#[id number] PER RN SERENE 07/22/22 @1916   URINALYSIS, REFLEX TO URINE CULTURE    Narrative:     Specimen Source->Urine   SARS-COV-2 RNA AMPLIFICATION, QUAL     EKG Readings: (Independently Interpreted)   Rhythm: Sinus Bradycardia. Heart Rate: 50. ST Segments: Normal ST Segments. T Waves Flipped: III, V2 and V3. Axis: Left Axis Deviation.       Imaging Results          X-Ray Chest AP Portable (Final result)  Result time 07/22/22 19:00:12    Final result by Simone Rivera MD (07/22/22 19:00:12)                 Impression:      1. Interstitial findings are similar to the previous exam, chronic change versus interstitial edema are considerations, correlation is needed.      Electronically signed by: Simone Rivera MD  Date:    07/22/2022  Time:    19:00              Narrative:    EXAMINATION:  XR CHEST AP PORTABLE    CLINICAL HISTORY:  CHF;    TECHNIQUE:  Single frontal view of the chest was performed.    COMPARISON:  04/14/2022    FINDINGS:  The cardiomediastinal silhouette is prominent, similar to the previous exam noting magnification by technique.  There is atherosclerotic calcification of the aorta noting aortic tortuosity..  There is no pleural effusion.  The trachea is midline.  The lungs are symmetrically expanded bilaterally with coarse interstitial attenuation and mild volume loss involving the left lower lung zone, similar to the previous exam..  No large focal consolidation seen.  There is no pneumothorax.  The osseous structures 4 degenerative changes and osteopenia noting spinal stimulator and surgical changes of the cervical spine..                                 Medications   HYDROmorphone injection 1 mg (1 mg Intravenous Given 7/22/22 2020)     Medical Decision Making:   History:   I obtained history from: someone other than patient.  Old Medical Records: I decided to obtain old medical records.  Initial Assessment:   66 F PMH CHF, CAD presenting with 3 day history of bilateral leg pain and swelling  Differential Diagnosis:   CHF exacerbation  Venous insufficiency  Nephrotic syndrome  Hypothyroidism  Clinical Tests:   Lab Tests: Ordered  Radiological Study: Ordered  Medical Tests: Ordered  ED Management:  Will obtain labs and chest x-ray.            Attending Attestation:   Physician Attestation Statement for Resident:  As the supervising MD   Physician Attestation Statement: I have personally seen and examined this patient.   I agree with the above history. -: Patient subjectively complaining of acute on chronic lower extremity edema.  Weight gain.  She had a decreased pulse ox in clinic.  Patient endorses shortness of breath mild cough.  BNP elevated to 288. Chest x-ray with some interstitial changes.    CBC without leukocytosis or anemia.  CMP normal.  BNP  elevated at 288. Troponin normal.  TSH elevated 11.054.  Free T4 low at 0.69.  COVID is negative.  Chest x-ray with chronic changes.    Given bradycardia 50, elevated , pretibial edema, and slowed mentation, I was concerned for myxedema coma. Endocrine consulted who do not feel this is consistent with myxedema coma. They recommend increasing levothyroxine to 137.    Throughout observation in the ER, patient remained hemodynamically stable without any hypoxia.  She is breathing comfortably on room air.  I offered her inpatient diuresis in observation but she is comfortable attempting outpatient.  She notes that Dr. Rosado stopped her Lasix a few months ago due to hypotension.  On chart review she was on Lasix 40 mg daily.  Due to the CHF findings currently, will initiate Lasix 20 mg daily.  Will increase patient's levothyroxine to 137. Script provided.  Patient and  provided with instructions for symptomatic treatment extensive return precautions.  Tthey are comfortable with plan.   As the supervising MD I agree with the above PE.    As the supervising MD I agree with the above treatment, course, plan, and disposition.                ED Course as of 07/22/22 2057 Fri Jul 22, 2022   1834 EKG 12-lead [LAZARA]   1934 BNP(!): 288 [LAZARA]   1935 X-Ray Chest AP Portable  No acute changes from prior exam [LAZARA]      ED Course User Index  [LAZARA] Robert Greco MD           Clinical Impression:   Final diagnoses:  [R06.02] Shortness of breath  [I50.9] Acute on chronic congestive heart failure, unspecified heart failure type (Primary)  [E03.9] Hypothyroidism, unspecified type          ED Disposition Condition    Discharge Fair        ED Prescriptions     Medication Sig Dispense Start Date End Date Auth. Provider    furosemide (LASIX) 20 MG tablet Take 1 tablet (20 mg total) by mouth once daily. 30 tablet 7/22/2022 8/21/2022 Sanjay Marie MD    levothyroxine (SYNTHROID) 137 MCG Tab tablet Take 1 tablet (137 mcg total)  by mouth before breakfast. 30 tablet 7/22/2022 8/21/2022 Sanjay Marie MD        Follow-up Information     Follow up With Specialties Details Why Contact Info Additional Information    [facility] - Emergency Dept Emergency Medicine  As needed, If symptoms worsen [facility] [id number] [phone]     Mesfin Hodges II, MD Internal Medicine   [address]  [place] [address]  [phone]       [facility] - Endocrinology Endocrinology Schedule an appointment as soon as possible for a visit   [address] [place] [id number] [phone] [facility] - Second Floor           Sanjay Marie MD  07/22/22 2057

## 2022-07-22 NOTE — TELEPHONE ENCOUNTER
Called and spoke to Mr Pierson. Explained that the Doctor recommended ramila patient be seen today in regards to her weight gain. Scheduled pt for UC appt today at 2p

## 2022-07-22 NOTE — TELEPHONE ENCOUNTER
She had a procedure on 7/19.  She needs to be seen to figure out what's going on and to reconcile medications.  I'm sure something has dropped off her med list.      Encourage her to make an appointment today; o/w she'll be in the ED over the weekend.    D

## 2022-07-22 NOTE — PROGRESS NOTES
1458 called [place] w/ hosp for van transport.  1500 applied O2 1/L nc d/t pt @ 91%.     1538 pt c/o chest pressure, notified PCP who ok'd EMS transport.   Vs done Bp 110/60 barely audible , pt sat increased from 91 to 96% while on 1% o2.      [facility] arrived EKG was done and noted SB w/Sinus arrythmia w/2 degree SA block (Mobitz 2) + Anterior T abnormality.  Pt does have a suprapubic cath also but output is minimal. (maybe d/t discarded collection prior to OV).     Pt is [place] in L[place]ear and [place] in R/ear.    1558 Pt was transferred from  and transported out via stretcher.  Her spouse was w/ her the entire time.

## 2022-07-23 NOTE — DISCHARGE INSTRUCTIONS
Take Lasix daily to encourage you to urinate off all the fluid you are retaining.  Increase your levothyroxine dose as prescribed.  Return to the ER for any worsening symptoms.  Follow up outpatient with endocrinology and your PCP.

## 2022-07-25 ENCOUNTER — HOSPITAL ENCOUNTER (EMERGENCY)
Facility: HOSPITAL | Age: 67
Discharge: HOME | End: 2022-07-25
Attending: EMERGENCY MEDICINE
Payer: MEDICARE

## 2022-07-25 ENCOUNTER — TELEPHONE (OUTPATIENT)
Dept: INTERNAL MEDICINE | Facility: CLINIC | Age: 67
End: 2022-07-25
Payer: MEDICARE

## 2022-07-25 VITALS
TEMPERATURE: 98 F | WEIGHT: 185 LBS | HEIGHT: 64 IN | RESPIRATION RATE: 16 BRPM | BODY MASS INDEX: 31.58 KG/M2 | HEART RATE: 74 BPM | SYSTOLIC BLOOD PRESSURE: 102 MMHG | DIASTOLIC BLOOD PRESSURE: 51 MMHG | OXYGEN SATURATION: 100 %

## 2022-07-25 DIAGNOSIS — R07.9 CHEST PAIN: ICD-10-CM

## 2022-07-25 LAB
ALBUMIN SERPL BCP-MCNC: 3.2 G/DL (ref 3.5–5.2)
ALP SERPL-CCNC: 107 U/L (ref 55–135)
ALT SERPL W/O P-5'-P-CCNC: 11 U/L (ref 10–44)
ANION GAP SERPL CALC-SCNC: 8 MMOL/L (ref 8–16)
AST SERPL-CCNC: 11 U/L (ref 10–40)
BASOPHILS # BLD AUTO: 0.02 K/UL (ref 0–0.2)
BASOPHILS NFR BLD: 0.4 % (ref 0–1.9)
BILIRUB SERPL-MCNC: 0.2 MG/DL (ref 0.1–1)
BNP SERPL-MCNC: 11 PG/ML (ref 0–99)
BUN SERPL-MCNC: 12 MG/DL (ref 8–23)
CALCIUM SERPL-MCNC: 8.6 MG/DL (ref 8.7–10.5)
CHLORIDE SERPL-SCNC: 101 MMOL/L (ref 95–110)
CO2 SERPL-SCNC: 32 MMOL/L (ref 23–29)
CREAT SERPL-MCNC: 0.9 MG/DL (ref 0.5–1.4)
DIFFERENTIAL METHOD: ABNORMAL
EOSINOPHIL # BLD AUTO: 0.3 K/UL (ref 0–0.5)
EOSINOPHIL NFR BLD: 5.6 % (ref 0–8)
ERYTHROCYTE [DISTWIDTH] IN BLOOD BY AUTOMATED COUNT: 13.9 % (ref 11.5–14.5)
EST. GFR  (AFRICAN AMERICAN): >60 ML/MIN/1.73 M^2
EST. GFR  (NON AFRICAN AMERICAN): >60 ML/MIN/1.73 M^2
GLUCOSE SERPL-MCNC: 93 MG/DL (ref 70–110)
HCT VFR BLD AUTO: 29 % (ref 37–48.5)
HGB BLD-MCNC: 8.9 G/DL (ref 12–16)
IMM GRANULOCYTES # BLD AUTO: 0.01 K/UL (ref 0–0.04)
IMM GRANULOCYTES NFR BLD AUTO: 0.2 % (ref 0–0.5)
LYMPHOCYTES # BLD AUTO: 1.4 K/UL (ref 1–4.8)
LYMPHOCYTES NFR BLD: 26.7 % (ref 18–48)
MCH RBC QN AUTO: 27.9 PG (ref 27–31)
MCHC RBC AUTO-ENTMCNC: 30.7 G/DL (ref 32–36)
MCV RBC AUTO: 91 FL (ref 82–98)
MONOCYTES # BLD AUTO: 0.6 K/UL (ref 0.3–1)
MONOCYTES NFR BLD: 12.2 % (ref 4–15)
NEUTROPHILS # BLD AUTO: 2.8 K/UL (ref 1.8–7.7)
NEUTROPHILS NFR BLD: 54.9 % (ref 38–73)
NRBC BLD-RTO: 0 /100 WBC
PLATELET # BLD AUTO: 170 K/UL (ref 150–450)
PMV BLD AUTO: 9.3 FL (ref 9.2–12.9)
POTASSIUM SERPL-SCNC: 3.9 MMOL/L (ref 3.5–5.1)
PROT SERPL-MCNC: 6.4 G/DL (ref 6–8.4)
RBC # BLD AUTO: 3.19 M/UL (ref 4–5.4)
SODIUM SERPL-SCNC: 141 MMOL/L (ref 136–145)
TROPONIN I SERPL DL<=0.01 NG/ML-MCNC: 0.01 NG/ML (ref 0–0.03)
TROPONIN I SERPL DL<=0.01 NG/ML-MCNC: 0.02 NG/ML (ref 0–0.03)
WBC # BLD AUTO: 5.17 K/UL (ref 3.9–12.7)

## 2022-07-25 PROCEDURE — 84484 ASSAY OF TROPONIN QUANT: CPT | Mod: 91 | Performed by: EMERGENCY MEDICINE

## 2022-07-25 PROCEDURE — 93010 EKG 12-LEAD: ICD-10-PCS | Mod: ,,, | Performed by: INTERNAL MEDICINE

## 2022-07-25 PROCEDURE — 93010 ELECTROCARDIOGRAM REPORT: CPT | Mod: ,,, | Performed by: INTERNAL MEDICINE

## 2022-07-25 PROCEDURE — 96376 TX/PRO/DX INJ SAME DRUG ADON: CPT

## 2022-07-25 PROCEDURE — 99285 EMERGENCY DEPT VISIT HI MDM: CPT | Mod: 25

## 2022-07-25 PROCEDURE — 80053 COMPREHEN METABOLIC PANEL: CPT | Performed by: EMERGENCY MEDICINE

## 2022-07-25 PROCEDURE — 96374 THER/PROPH/DIAG INJ IV PUSH: CPT

## 2022-07-25 PROCEDURE — 63600175 PHARM REV CODE 636 W HCPCS: Performed by: EMERGENCY MEDICINE

## 2022-07-25 PROCEDURE — 83880 ASSAY OF NATRIURETIC PEPTIDE: CPT | Performed by: EMERGENCY MEDICINE

## 2022-07-25 PROCEDURE — 85025 COMPLETE CBC W/AUTO DIFF WBC: CPT | Performed by: EMERGENCY MEDICINE

## 2022-07-25 PROCEDURE — 93005 ELECTROCARDIOGRAM TRACING: CPT

## 2022-07-25 PROCEDURE — 96375 TX/PRO/DX INJ NEW DRUG ADDON: CPT

## 2022-07-25 RX ORDER — HYDROMORPHONE HYDROCHLORIDE 1 MG/ML
0.5 INJECTION, SOLUTION INTRAMUSCULAR; INTRAVENOUS; SUBCUTANEOUS
Status: COMPLETED | OUTPATIENT
Start: 2022-07-25 | End: 2022-07-25

## 2022-07-25 RX ORDER — FUROSEMIDE 10 MG/ML
40 INJECTION INTRAMUSCULAR; INTRAVENOUS
Status: COMPLETED | OUTPATIENT
Start: 2022-07-25 | End: 2022-07-25

## 2022-07-25 RX ADMIN — HYDROMORPHONE HYDROCHLORIDE 0.5 MG: 1 INJECTION, SOLUTION INTRAMUSCULAR; INTRAVENOUS; SUBCUTANEOUS at 03:07

## 2022-07-25 RX ADMIN — FUROSEMIDE 40 MG: 10 INJECTION, SOLUTION INTRAMUSCULAR; INTRAVENOUS at 08:07

## 2022-07-25 RX ADMIN — HYDROMORPHONE HYDROCHLORIDE 0.5 MG: 1 INJECTION, SOLUTION INTRAMUSCULAR; INTRAVENOUS; SUBCUTANEOUS at 04:07

## 2022-07-25 NOTE — TELEPHONE ENCOUNTER
----- Message from Arely Trimble sent at 7/25/2022  8:23 AM CDT -----  Contact: [phone]  Caller is requesting an earlier appointment then we can schedule.  Caller is requesting a message be sent to the provider.  When is the next available appointment with their provider:  08/29/2022  Reason for the appointment:  ED Follow Up  Patient preference of timeframe to be scheduled:  asap  Would the patient like a call back, or a response through their [facility] portal?:   call  Comments:      Please call and advise.    Thank You

## 2022-07-25 NOTE — PROVIDER PROGRESS NOTES - EMERGENCY DEPT.
Encounter Date: 7/25/2022    ED Physician Progress Notes             **This is an assumption of care note**    Case accepted from Dr. Bray at shift change awaiting 2nd troponin.  Plan is if negative patient is appropriate for discharge home.      Delta troponin within normal limits      I discussed the results of her delta troponin with her.  She will need to follow-up with her primary care physician for further care and management of her leg edema, I have discussed with her that she can take her Lasix 2 times a day for the next few days to help with her diuresis of her lower leg edema. After taking into careful account the historical factors and physical exam findings of the patient's presentation today, in conjunction with the empirical and objective data obtained on ED workup, no acute emergent medical condition requiring admission has been identified. The patient appears to be low risk for an emergent medical condition and I feel it is safe and appropriate at this time for the patient to be discharged to follow-up as detailed in their discharge instructions for reevaluation and possible continued outpatient workup and management. I have discussed the specifics of the workup with the patient and the patient has verbalized understanding of the details of the workup, the diagnosis, the treatment plan, and the need for outpatient follow-up.  Although the patient has no emergent etiology today this does not preclude the development of an emergent condition so in addition, I have advised the patient that they can return to the ED and/or activate EMS at any time with worsening of their symptoms, change of their symptoms, or with any other medical complaint.  The patient remained comfortable and stable during their visit in the ED.  Discharge and follow-up instructions discussed with the patient who expressed understanding and willingness to comply with my recommendations.     This medical record was prepared using  voice dictation software. There may be phonetic errors.

## 2022-07-25 NOTE — ED NOTES
Report received from REJI Moreira. Assumed care of pt. Pt resting in stretcher, family member at bedside. Side rails up x 2 and call light within reach. Will continue with current plan of care.

## 2022-07-25 NOTE — TELEPHONE ENCOUNTER
----- Message from Christina Pillai sent at 7/25/2022  2:33 PM CDT -----  Regarding: returned call  Contact: spouse [phone]  Patient is returning a phone call.  Who left a message for the patient: Carrie BENNETT  Does patient know what this is regarding:    Would you like a call back, or a response through your [facility] portal?:   appointment  Comments:

## 2022-07-27 ENCOUNTER — HOSPITAL ENCOUNTER (OUTPATIENT)
Facility: HOSPITAL | Age: 67
Discharge: HOME HEALTH | End: 2022-07-31
Attending: EMERGENCY MEDICINE | Admitting: INTERNAL MEDICINE
Payer: MEDICARE

## 2022-07-27 ENCOUNTER — OFFICE VISIT (OUTPATIENT)
Dept: INTERNAL MEDICINE | Facility: CLINIC | Age: 67
End: 2022-07-27
Payer: MEDICARE

## 2022-07-27 VITALS — WEIGHT: 202.38 LBS | HEIGHT: 64 IN | OXYGEN SATURATION: 92 % | BODY MASS INDEX: 34.55 KG/M2 | HEART RATE: 68 BPM

## 2022-07-27 DIAGNOSIS — R60.0 BILATERAL LEG EDEMA: ICD-10-CM

## 2022-07-27 DIAGNOSIS — R06.02 SHORTNESS OF BREATH: ICD-10-CM

## 2022-07-27 DIAGNOSIS — I25.119 CORONARY ARTERY DISEASE INVOLVING NATIVE CORONARY ARTERY OF NATIVE HEART WITH ANGINA PECTORIS: ICD-10-CM

## 2022-07-27 DIAGNOSIS — I50.33 ACUTE ON CHRONIC DIASTOLIC CONGESTIVE HEART FAILURE: Primary | ICD-10-CM

## 2022-07-27 DIAGNOSIS — G47.00 INSOMNIA, UNSPECIFIED TYPE: ICD-10-CM

## 2022-07-27 DIAGNOSIS — I89.0 LYMPHEDEMA OF BOTH LOWER EXTREMITIES: Chronic | ICD-10-CM

## 2022-07-27 DIAGNOSIS — I50.9 ACUTE ON CHRONIC CONGESTIVE HEART FAILURE, UNSPECIFIED HEART FAILURE TYPE: Primary | ICD-10-CM

## 2022-07-27 DIAGNOSIS — I50.9 ACUTE EXACERBATION OF CHF (CONGESTIVE HEART FAILURE): ICD-10-CM

## 2022-07-27 DIAGNOSIS — E03.9 PRIMARY HYPOTHYROIDISM: Chronic | ICD-10-CM

## 2022-07-27 PROBLEM — R06.00 DYSPNEA: Status: RESOLVED | Noted: 2019-02-22 | Resolved: 2022-07-27

## 2022-07-27 PROBLEM — M94.0 COSTOCHONDRITIS: Status: RESOLVED | Noted: 2019-02-22 | Resolved: 2022-07-27

## 2022-07-27 PROBLEM — N17.9 AKI (ACUTE KIDNEY INJURY): Status: RESOLVED | Noted: 2018-06-08 | Resolved: 2022-07-27

## 2022-07-27 PROBLEM — J18.9 CAP (COMMUNITY ACQUIRED PNEUMONIA): Status: RESOLVED | Noted: 2021-06-02 | Resolved: 2022-07-27

## 2022-07-27 LAB
ALBUMIN SERPL BCP-MCNC: 3.3 G/DL (ref 3.5–5.2)
ALP SERPL-CCNC: 96 U/L (ref 55–135)
ALT SERPL W/O P-5'-P-CCNC: <5 U/L (ref 10–44)
ANION GAP SERPL CALC-SCNC: 10 MMOL/L (ref 8–16)
AST SERPL-CCNC: 9 U/L (ref 10–40)
BASOPHILS # BLD AUTO: 0.01 K/UL (ref 0–0.2)
BASOPHILS NFR BLD: 0.2 % (ref 0–1.9)
BILIRUB SERPL-MCNC: 0.3 MG/DL (ref 0.1–1)
BNP SERPL-MCNC: 17 PG/ML (ref 0–99)
BUN SERPL-MCNC: 7 MG/DL (ref 8–23)
CALCIUM SERPL-MCNC: 9 MG/DL (ref 8.7–10.5)
CHLORIDE SERPL-SCNC: 101 MMOL/L (ref 95–110)
CO2 SERPL-SCNC: 30 MMOL/L (ref 23–29)
CREAT SERPL-MCNC: 0.8 MG/DL (ref 0.5–1.4)
DIFFERENTIAL METHOD: ABNORMAL
EOSINOPHIL # BLD AUTO: 0.3 K/UL (ref 0–0.5)
EOSINOPHIL NFR BLD: 5 % (ref 0–8)
ERYTHROCYTE [DISTWIDTH] IN BLOOD BY AUTOMATED COUNT: 14.1 % (ref 11.5–14.5)
EST. GFR  (AFRICAN AMERICAN): >60 ML/MIN/1.73 M^2
EST. GFR  (NON AFRICAN AMERICAN): >60 ML/MIN/1.73 M^2
GLUCOSE SERPL-MCNC: 81 MG/DL (ref 70–110)
HCT VFR BLD AUTO: 28.6 % (ref 37–48.5)
HGB BLD-MCNC: 9.1 G/DL (ref 12–16)
IMM GRANULOCYTES # BLD AUTO: 0.01 K/UL (ref 0–0.04)
IMM GRANULOCYTES NFR BLD AUTO: 0.2 % (ref 0–0.5)
LYMPHOCYTES # BLD AUTO: 1.3 K/UL (ref 1–4.8)
LYMPHOCYTES NFR BLD: 24.4 % (ref 18–48)
MCH RBC QN AUTO: 28.6 PG (ref 27–31)
MCHC RBC AUTO-ENTMCNC: 31.8 G/DL (ref 32–36)
MCV RBC AUTO: 90 FL (ref 82–98)
MONOCYTES # BLD AUTO: 0.7 K/UL (ref 0.3–1)
MONOCYTES NFR BLD: 12.8 % (ref 4–15)
NEUTROPHILS # BLD AUTO: 3 K/UL (ref 1.8–7.7)
NEUTROPHILS NFR BLD: 57.4 % (ref 38–73)
NRBC BLD-RTO: 0 /100 WBC
PLATELET # BLD AUTO: 187 K/UL (ref 150–450)
PMV BLD AUTO: 11.5 FL (ref 9.2–12.9)
POTASSIUM SERPL-SCNC: 4 MMOL/L (ref 3.5–5.1)
PROT SERPL-MCNC: 6.8 G/DL (ref 6–8.4)
RBC # BLD AUTO: 3.18 M/UL (ref 4–5.4)
SODIUM SERPL-SCNC: 141 MMOL/L (ref 136–145)
TROPONIN I SERPL DL<=0.01 NG/ML-MCNC: 0.01 NG/ML (ref 0–0.03)
WBC # BLD AUTO: 5.24 K/UL (ref 3.9–12.7)

## 2022-07-27 PROCEDURE — 85025 COMPLETE CBC W/AUTO DIFF WBC: CPT | Performed by: EMERGENCY MEDICINE

## 2022-07-27 PROCEDURE — 93005 ELECTROCARDIOGRAM TRACING: CPT

## 2022-07-27 PROCEDURE — 99999 PR PBB SHADOW E&M-EST. PATIENT-LVL IV: ICD-10-PCS | Mod: PBBFAC,,, | Performed by: STUDENT IN AN ORGANIZED HEALTH CARE EDUCATION/TRAINING PROGRAM

## 2022-07-27 PROCEDURE — 99999 PR PBB SHADOW E&M-EST. PATIENT-LVL IV: CPT | Mod: PBBFAC,,, | Performed by: STUDENT IN AN ORGANIZED HEALTH CARE EDUCATION/TRAINING PROGRAM

## 2022-07-27 PROCEDURE — 93010 ELECTROCARDIOGRAM REPORT: CPT | Mod: ,,, | Performed by: INTERNAL MEDICINE

## 2022-07-27 PROCEDURE — 1100F PR PT FALLS ASSESS DOC 2+ FALLS/FALL W/INJURY/YR: ICD-10-PCS | Mod: CPTII,S$GLB,, | Performed by: STUDENT IN AN ORGANIZED HEALTH CARE EDUCATION/TRAINING PROGRAM

## 2022-07-27 PROCEDURE — 93010 EKG 12-LEAD: ICD-10-PCS | Mod: ,,, | Performed by: INTERNAL MEDICINE

## 2022-07-27 PROCEDURE — 96374 THER/PROPH/DIAG INJ IV PUSH: CPT

## 2022-07-27 PROCEDURE — 3288F PR FALLS RISK ASSESSMENT DOCUMENTED: ICD-10-PCS | Mod: CPTII,S$GLB,, | Performed by: STUDENT IN AN ORGANIZED HEALTH CARE EDUCATION/TRAINING PROGRAM

## 2022-07-27 PROCEDURE — 3066F PR DOCUMENTATION OF TREATMENT FOR NEPHROPATHY: ICD-10-PCS | Mod: CPTII,S$GLB,, | Performed by: STUDENT IN AN ORGANIZED HEALTH CARE EDUCATION/TRAINING PROGRAM

## 2022-07-27 PROCEDURE — 99285 EMERGENCY DEPT VISIT HI MDM: CPT | Mod: 25

## 2022-07-27 PROCEDURE — 1160F RVW MEDS BY RX/DR IN RCRD: CPT | Mod: CPTII,S$GLB,, | Performed by: STUDENT IN AN ORGANIZED HEALTH CARE EDUCATION/TRAINING PROGRAM

## 2022-07-27 PROCEDURE — 3066F NEPHROPATHY DOC TX: CPT | Mod: CPTII,S$GLB,, | Performed by: STUDENT IN AN ORGANIZED HEALTH CARE EDUCATION/TRAINING PROGRAM

## 2022-07-27 PROCEDURE — 3288F FALL RISK ASSESSMENT DOCD: CPT | Mod: CPTII,S$GLB,, | Performed by: STUDENT IN AN ORGANIZED HEALTH CARE EDUCATION/TRAINING PROGRAM

## 2022-07-27 PROCEDURE — 83880 ASSAY OF NATRIURETIC PEPTIDE: CPT | Performed by: EMERGENCY MEDICINE

## 2022-07-27 PROCEDURE — 1125F AMNT PAIN NOTED PAIN PRSNT: CPT | Mod: CPTII,S$GLB,, | Performed by: STUDENT IN AN ORGANIZED HEALTH CARE EDUCATION/TRAINING PROGRAM

## 2022-07-27 PROCEDURE — 1159F MED LIST DOCD IN RCRD: CPT | Mod: CPTII,S$GLB,, | Performed by: STUDENT IN AN ORGANIZED HEALTH CARE EDUCATION/TRAINING PROGRAM

## 2022-07-27 PROCEDURE — 63600175 PHARM REV CODE 636 W HCPCS: Performed by: EMERGENCY MEDICINE

## 2022-07-27 PROCEDURE — 3008F BODY MASS INDEX DOCD: CPT | Mod: CPTII,S$GLB,, | Performed by: STUDENT IN AN ORGANIZED HEALTH CARE EDUCATION/TRAINING PROGRAM

## 2022-07-27 PROCEDURE — 1160F PR REVIEW ALL MEDS BY PRESCRIBER/CLIN PHARMACIST DOCUMENTED: ICD-10-PCS | Mod: CPTII,S$GLB,, | Performed by: STUDENT IN AN ORGANIZED HEALTH CARE EDUCATION/TRAINING PROGRAM

## 2022-07-27 PROCEDURE — 1159F PR MEDICATION LIST DOCUMENTED IN MEDICAL RECORD: ICD-10-PCS | Mod: CPTII,S$GLB,, | Performed by: STUDENT IN AN ORGANIZED HEALTH CARE EDUCATION/TRAINING PROGRAM

## 2022-07-27 PROCEDURE — 3008F PR BODY MASS INDEX (BMI) DOCUMENTED: ICD-10-PCS | Mod: CPTII,S$GLB,, | Performed by: STUDENT IN AN ORGANIZED HEALTH CARE EDUCATION/TRAINING PROGRAM

## 2022-07-27 PROCEDURE — 1125F PR PAIN SEVERITY QUANTIFIED, PAIN PRESENT: ICD-10-PCS | Mod: CPTII,S$GLB,, | Performed by: STUDENT IN AN ORGANIZED HEALTH CARE EDUCATION/TRAINING PROGRAM

## 2022-07-27 PROCEDURE — 99214 PR OFFICE/OUTPT VISIT, EST, LEVL IV, 30-39 MIN: ICD-10-PCS | Mod: S$GLB,,, | Performed by: STUDENT IN AN ORGANIZED HEALTH CARE EDUCATION/TRAINING PROGRAM

## 2022-07-27 PROCEDURE — 1100F PTFALLS ASSESS-DOCD GE2>/YR: CPT | Mod: CPTII,S$GLB,, | Performed by: STUDENT IN AN ORGANIZED HEALTH CARE EDUCATION/TRAINING PROGRAM

## 2022-07-27 PROCEDURE — 80053 COMPREHEN METABOLIC PANEL: CPT | Performed by: EMERGENCY MEDICINE

## 2022-07-27 PROCEDURE — 84484 ASSAY OF TROPONIN QUANT: CPT | Performed by: EMERGENCY MEDICINE

## 2022-07-27 PROCEDURE — 99214 OFFICE O/P EST MOD 30 MIN: CPT | Mod: S$GLB,,, | Performed by: STUDENT IN AN ORGANIZED HEALTH CARE EDUCATION/TRAINING PROGRAM

## 2022-07-27 RX ORDER — ONDANSETRON 4 MG/1
TABLET, ORALLY DISINTEGRATING ORAL
Status: ON HOLD | COMMUNITY
Start: 2022-07-26 | End: 2022-09-18

## 2022-07-27 RX ORDER — TIZANIDINE 4 MG/1
4 TABLET ORAL 2 TIMES DAILY
Status: ON HOLD | COMMUNITY
Start: 2022-07-26 | End: 2023-01-19 | Stop reason: HOSPADM

## 2022-07-27 RX ORDER — FUROSEMIDE 10 MG/ML
80 INJECTION INTRAMUSCULAR; INTRAVENOUS
Status: COMPLETED | OUTPATIENT
Start: 2022-07-27 | End: 2022-07-27

## 2022-07-27 RX ADMIN — FUROSEMIDE 80 MG: 10 INJECTION, SOLUTION INTRAMUSCULAR; INTRAVENOUS at 11:07

## 2022-07-27 NOTE — PROGRESS NOTES
INTERNAL MEDICINE URGENT CARE VISIT NOTE        Assessment/Plan     1. Acute on chronic diastolic congestive heart failure  2. Bilateral leg edema  3. Shortness of breath  Pale, fatigued and unable to get blood pressure detected on either arm in the setting of shortness of breath, worsening lower extremity swelling, and known hx of CHF. High suspicion for CHF exacerbation. Will refer to ER for IV diuresis and likelihood of admission high.   - Refer to Emergency Dept.      Health Maintenance reviewed and discussed with patient.   RTC in PRN, sooner if needed.    Subjective:     Chief Complaint: Follow-up       Patient ID: Tasha Hawley is a 66 y.o. female with narcotic dependency, GERD, hypothyroidism, dCHF, ischemic cardiomyopathy, WENDI, depressionwho is here to discuss leg swelling.       During intake vitals, patient had undetectable blood pressure on both arms. She had a pulse oximeter reading of 92% on room air. She endorsed feeling short of breath, had lower extremity swelling and pain (worse than usual 2/2 to lymphedema), and appeared pale and fatigued.         Past Medical History:  Past Medical History:   Diagnosis Date    Anticoagulant long-term use     Anxiety     Arthritis     Bilateral lower extremity edema     severe chronic    Carotid artery occlusion     Cataract     CHF (congestive heart failure)     Coronary artery disease     subtotalled LAD with collateral    Depression     Fever blister     Hypothyroid     Iron deficiency anemia     Lumbar radiculopathy     with chronic pain    Ocular migraine     Renal disorder     Sleep apnea     cpap       Home Medications:  Prior to Admission medications    Medication Sig Start Date End Date Taking? Authorizing Provider   acyclovir 5% (ZOVIRAX) 5 % ointment Apply topically 5 (five) times daily. 5/20/22   Mesfin Hodges II, MD   aspirin (ECOTRIN) 81 MG EC tablet Take 1 tablet (81 mg total) by mouth once daily. 1/7/16 7/18/22  Tomer DANIELS  MD Stella   atorvastatin (LIPITOR) 80 MG tablet TAKE ONE TABLET BY MOUTH EVERY DAY  Patient taking differently: Take by mouth every evening. 8/27/21   Mesfin Hodges II, MD   butalbital-acetaminophen-caffeine -40 mg (FIORICET, ESGIC) -40 mg per tablet Take 1 tablet by mouth daily as needed. 9/9/21      FLUoxetine 20 MG capsule Take 3 capsules (60 mg total) by mouth once daily. 12/30/21   Juan A Madera MD   furosemide (LASIX) 20 MG tablet Take 1 tablet (20 mg total) by mouth once daily. 7/22/22 8/21/22  Sanjay Marie MD   HYDROcodone-acetaminophen (NORCO)  mg per tablet Take by mouth every 24 hours as needed. 3/2/22   Historical Provider   intrathecal pain pump compound Hydromorphone (7.5 mg/mL) infusion at 3.6799 mg/day (0.1533 mg/hr) out of a total reservoir volume of 37.3 mL  Pump filled every 2 months    Nigel Hillman MD   levothyroxine (SYNTHROID) 137 MCG Tab tablet Take 1 tablet (137 mcg total) by mouth before breakfast. 7/22/22 8/21/22  Sanjay Marie MD   lidocaine (LIDODERM) 5 % daily as needed.  11/5/20   Historical Provider   nitroGLYCERIN (NITROSTAT) 0.4 MG SL tablet Place 1 tablet (0.4 mg total) under the tongue every 5 (five) minutes as needed for Chest pain. 8/31/20 4/29/22  Mesfin Hodges II, MD   pantoprazole (PROTONIX) 40 MG tablet TAKE ONE TABLET BY MOUTH EVERY DAY  Patient taking differently: Take by mouth every morning. 11/15/21   Mesfin Hodges II, MD   potassium chloride SA (K-DUR,KLOR-CON) 20 MEQ tablet Take 1 tablet (20 mEq total) by mouth 2 (two) times daily. 4/30/22   Mesfin Hodges II, MD   promethazine (PHENERGAN) 25 MG tablet Take 25 mg by mouth every 6 (six) hours as needed for Nausea.    Historical Provider   QUEtiapine (SEROQUEL) 100 MG Tab TAKE 2 TABLETS (200 MG) BY MOUTH NIGHTLY 4/27/22   Mesfin Hodges II, MD   senna (SENNA LAX) 8.6 mg tablet Take 2 tablets by mouth 2 (two) times daily. 1/31/20   Nic Peres MD   traZODone (DESYREL) 100  MG tablet Take 3 tablets (300 mg total) by mouth every evening. 7/20/22   Juan A Madera MD   vibegron (GEMTESA) 75 mg Tab Take 1 tablet by mouth Daily. 6/23/22   Shireen Mayo MD   torsemide (DEMADEX) 100 MG Tab TAKE ONE TABLET BY MOUTH EVERY DAY 3/22/22 4/22/22  Lyndon Maldonado MD       Allergies:  Review of patient's allergies indicates:   Allergen Reactions    (d)-limonene flavor      Other reaction(s): difficult intubation  Other reaction(s): Difficulty breathing    Bactrim [sulfamethoxazole-trimethoprim] Anaphylaxis    Benadryl [diphenhydramine hcl] Shortness Of Breath    Fentanyl Itching, Nausea And Vomiting and Swelling             Imitrex [sumatriptan succinate] Shortness Of Breath    Topamax [topiramate] Shortness Of Breath    Vancomycin Shortness Of Breath     Rash    Butorphanol tartrate     Darvocet a500 [propoxyphene n-acetaminophen]      Other reaction(s): Difficulty breathing    White petrolatum-zinc     Zinc oxide-white petrolatum      Other reaction(s): Difficulty breathing    Latex, natural rubber Itching and Rash    Phenytoin Rash and Other (See Comments)     Trouble breathing       Social History:  Social History     Tobacco Use    Smoking status: Never Smoker    Smokeless tobacco: Never Used   Substance Use Topics    Alcohol use: Never    Drug use: No        Review of Systems   Constitutional: Positive for activity change and fatigue. Negative for fever and unexpected weight change.   HENT: Negative for sinus pressure and sore throat.    Eyes: Negative for visual disturbance.   Respiratory: Positive for shortness of breath. Negative for cough.    Cardiovascular: Positive for leg swelling. Negative for chest pain and palpitations.   Gastrointestinal: Negative for constipation, diarrhea, nausea and vomiting.   Endocrine: Negative for polyuria.   Genitourinary: Negative for dysuria and urgency.   Musculoskeletal: Negative for arthralgias and myalgias.   Skin: Negative for  "rash.   Allergic/Immunologic: Negative for environmental allergies.   Neurological: Negative for dizziness, light-headedness and headaches.   Hematological: Does not bruise/bleed easily.   Psychiatric/Behavioral: Negative for agitation and decreased concentration. The patient is not nervous/anxious.          Health Maintenance:     Health Maintenance Due   Topic Date Due    DEXA Scan  Never done    Colorectal Cancer Screening  06/02/2021    COVID-19 Vaccine (4 - Booster for Pfizer series) 08/05/2022       Objective:   Pulse 68   Ht 5' 4" (1.626 m)   Wt 91.8 kg (202 lb 6.1 oz)   LMP  (LMP Unknown)   SpO2 (!) 92%   BMI 34.74 kg/m² unable to obtain blood pressure on either arm        General: AxO x 3, in wheelchair, pale appearing older woman   HEENT: PERRL, OP clear  Neck: Supple   CV: tachycardic, regular, no murmurs  Pulm: no accessory muscle use, crackles at bases bilaterally, poor airway movement, diminished throughout   Abd: s/NT/ND +BS  Extremities: 3+ edema to thighs bilaterally, wrapped and tender to any palpation      Labs:     Lab Results   Component Value Date    WBC 5.17 07/25/2022    HGB 8.9 (L) 07/25/2022    HCT 29.0 (L) 07/25/2022     07/25/2022    CHOL 141 04/05/2022    TRIG 66 04/05/2022    HDL 52 04/05/2022    ALT 11 07/25/2022    AST 11 07/25/2022     07/25/2022    K 3.9 07/25/2022     07/25/2022    CREATININE 0.9 07/25/2022    BUN 12 07/25/2022    CO2 32 (H) 07/25/2022    TSH 11.054 (H) 07/22/2022    INR 1.0 01/14/2021    HGBA1C 5.0 07/07/2021      CMP  Sodium   Date Value Ref Range Status   07/25/2022 141 136 - 145 mmol/L Final     Potassium   Date Value Ref Range Status   07/25/2022 3.9 3.5 - 5.1 mmol/L Final     Chloride   Date Value Ref Range Status   07/25/2022 101 95 - 110 mmol/L Final     CO2   Date Value Ref Range Status   07/25/2022 32 (H) 23 - 29 mmol/L Final     Glucose   Date Value Ref Range Status   07/25/2022 93 70 - 110 mg/dL Final     BUN   Date Value Ref " Range Status   07/25/2022 12 8 - 23 mg/dL Final     Creatinine   Date Value Ref Range Status   07/25/2022 0.9 0.5 - 1.4 mg/dL Final     Calcium   Date Value Ref Range Status   07/25/2022 8.6 (L) 8.7 - 10.5 mg/dL Final     Total Protein   Date Value Ref Range Status   07/25/2022 6.4 6.0 - 8.4 g/dL Final     Albumin   Date Value Ref Range Status   07/25/2022 3.2 (L) 3.5 - 5.2 g/dL Final     Total Bilirubin   Date Value Ref Range Status   07/25/2022 0.2 0.1 - 1.0 mg/dL Final     Comment:     For infants and newborns, interpretation of results should be based  on gestational age, weight and in agreement with clinical  observations.    Premature Infant recommended reference ranges:  Up to 24 hours.............<8.0 mg/dL  Up to 48 hours............<12.0 mg/dL  3-5 days..................<15.0 mg/dL  6-29 days.................<15.0 mg/dL       Alkaline Phosphatase   Date Value Ref Range Status   07/25/2022 107 55 - 135 U/L Final     AST   Date Value Ref Range Status   07/25/2022 11 10 - 40 U/L Final     ALT   Date Value Ref Range Status   07/25/2022 11 10 - 44 U/L Final     Anion Gap   Date Value Ref Range Status   07/25/2022 8 8 - 16 mmol/L Final     eGFR if    Date Value Ref Range Status   07/25/2022 >60 >60 mL/min/1.73 m^2 Final     eGFR if non    Date Value Ref Range Status   07/25/2022 >60 >60 mL/min/1.73 m^2 Final     Comment:     Calculation used to obtain the estimated glomerular filtration  rate (eGFR) is the CKD-EPI equation.        Lab Results   Component Value Date    HGBA1C 5.0 07/07/2021             Negin Thomas MD   [facility]  Department of Internal Medicine   07/27/2022

## 2022-07-28 LAB
ANION GAP SERPL CALC-SCNC: 10 MMOL/L (ref 8–16)
AORTIC ROOT ANNULUS: 2.65 CM
AORTIC VALVE CUSP SEPERATION: 1.89 CM
AV INDEX (PROSTH): 0.69
AV MEAN GRADIENT: 6 MMHG
AV PEAK GRADIENT: 11 MMHG
AV VALVE AREA: 2.22 CM2
AV VELOCITY RATIO: 0.78
BASOPHILS # BLD AUTO: 0.02 K/UL (ref 0–0.2)
BASOPHILS NFR BLD: 0.4 % (ref 0–1.9)
BSA FOR ECHO PROCEDURE: 1.99 M2
BUN SERPL-MCNC: 7 MG/DL (ref 8–23)
CALCIUM SERPL-MCNC: 9.1 MG/DL (ref 8.7–10.5)
CHLORIDE SERPL-SCNC: 99 MMOL/L (ref 95–110)
CO2 SERPL-SCNC: 31 MMOL/L (ref 23–29)
CREAT SERPL-MCNC: 0.9 MG/DL (ref 0.5–1.4)
CV ECHO LV RWT: 0.45 CM
DIFFERENTIAL METHOD: ABNORMAL
DOP CALC AO PEAK VEL: 1.63 M/S
DOP CALC AO VTI: 38.44 CM
DOP CALC LVOT AREA: 3.2 CM2
DOP CALC LVOT DIAMETER: 2.03 CM
DOP CALC LVOT PEAK VEL: 1.27 M/S
DOP CALC LVOT STROKE VOLUME: 85.4 CM3
DOP CALC MV VTI: 38.95 CM
DOP CALCLVOT PEAK VEL VTI: 26.4 CM
E WAVE DECELERATION TIME: 245.5 MSEC
E/A RATIO: 1.04
E/E' RATIO: 8.4 M/S
ECHO LV POSTERIOR WALL: 1.08 CM (ref 0.6–1.1)
EJECTION FRACTION: 60 %
EOSINOPHIL # BLD AUTO: 0.2 K/UL (ref 0–0.5)
EOSINOPHIL NFR BLD: 4.5 % (ref 0–8)
ERYTHROCYTE [DISTWIDTH] IN BLOOD BY AUTOMATED COUNT: 13.8 % (ref 11.5–14.5)
EST. GFR  (AFRICAN AMERICAN): >60 ML/MIN/1.73 M^2
EST. GFR  (NON AFRICAN AMERICAN): >60 ML/MIN/1.73 M^2
ESTIMATED AVG GLUCOSE: 123 MG/DL (ref 68–131)
FRACTIONAL SHORTENING: 46 % (ref 28–44)
GLUCOSE SERPL-MCNC: 82 MG/DL (ref 70–110)
HBA1C MFR BLD: 5.9 % (ref 4–5.6)
HCT VFR BLD AUTO: 32.1 % (ref 37–48.5)
HGB BLD-MCNC: 9.8 G/DL (ref 12–16)
IMM GRANULOCYTES # BLD AUTO: 0.01 K/UL (ref 0–0.04)
IMM GRANULOCYTES NFR BLD AUTO: 0.2 % (ref 0–0.5)
INTERVENTRICULAR SEPTUM: 0.89 CM (ref 0.6–1.1)
IVRT: 65.65 MSEC
LA MAJOR: 5.85 CM
LA MINOR: 4.89 CM
LA WIDTH: 3.93 CM
LEFT ATRIUM SIZE: 3.48 CM
LEFT ATRIUM VOLUME INDEX MOD: 30.9 ML/M2
LEFT ATRIUM VOLUME INDEX: 32.1 ML/M2
LEFT ATRIUM VOLUME MOD: 59.73 CM3
LEFT ATRIUM VOLUME: 61.93 CM3
LEFT INTERNAL DIMENSION IN SYSTOLE: 2.62 CM (ref 2.1–4)
LEFT VENTRICLE DIASTOLIC VOLUME INDEX: 55.97 ML/M2
LEFT VENTRICLE DIASTOLIC VOLUME: 108.03 ML
LEFT VENTRICLE MASS INDEX: 87 G/M2
LEFT VENTRICLE SYSTOLIC VOLUME INDEX: 13 ML/M2
LEFT VENTRICLE SYSTOLIC VOLUME: 25.1 ML
LEFT VENTRICULAR INTERNAL DIMENSION IN DIASTOLE: 4.81 CM (ref 3.5–6)
LEFT VENTRICULAR MASS: 167.32 G
LV LATERAL E/E' RATIO: 7 M/S
LV SEPTAL E/E' RATIO: 10.5 M/S
LYMPHOCYTES # BLD AUTO: 1.1 K/UL (ref 1–4.8)
LYMPHOCYTES NFR BLD: 20.4 % (ref 18–48)
MAGNESIUM SERPL-MCNC: 2.1 MG/DL (ref 1.6–2.6)
MCH RBC QN AUTO: 27.5 PG (ref 27–31)
MCHC RBC AUTO-ENTMCNC: 30.5 G/DL (ref 32–36)
MCV RBC AUTO: 90 FL (ref 82–98)
MONOCYTES # BLD AUTO: 0.7 K/UL (ref 0.3–1)
MONOCYTES NFR BLD: 12.8 % (ref 4–15)
MV MEAN GRADIENT: 1 MMHG
MV PEAK A VEL: 0.81 M/S
MV PEAK E VEL: 0.84 M/S
MV PEAK GRADIENT: 4 MMHG
MV STENOSIS PRESSURE HALF TIME: 71.2 MS
MV VALVE AREA BY CONTINUITY EQUATION: 2.19 CM2
MV VALVE AREA P 1/2 METHOD: 3.09 CM2
NEUTROPHILS # BLD AUTO: 3.3 K/UL (ref 1.8–7.7)
NEUTROPHILS NFR BLD: 61.7 % (ref 38–73)
NRBC BLD-RTO: 0 /100 WBC
PISA TR MAX VEL: 2.63 M/S
PLATELET # BLD AUTO: 199 K/UL (ref 150–450)
PMV BLD AUTO: 10.3 FL (ref 9.2–12.9)
POTASSIUM SERPL-SCNC: 3.8 MMOL/L (ref 3.5–5.1)
PV PEAK VELOCITY: 1 CM/S
RA MAJOR: 5.46 CM
RA PRESSURE: 3 MMHG
RA WIDTH: 3.21 CM
RBC # BLD AUTO: 3.56 M/UL (ref 4–5.4)
RIGHT VENTRICULAR END-DIASTOLIC DIMENSION: 2.24 CM
SODIUM SERPL-SCNC: 140 MMOL/L (ref 136–145)
TDI LATERAL: 0.12 M/S
TDI SEPTAL: 0.08 M/S
TDI: 0.1 M/S
TR MAX PG: 28 MMHG
TV REST PULMONARY ARTERY PRESSURE: 31 MMHG
WBC # BLD AUTO: 5.39 K/UL (ref 3.9–12.7)

## 2022-07-28 PROCEDURE — 63600175 PHARM REV CODE 636 W HCPCS: Performed by: NURSE PRACTITIONER

## 2022-07-28 PROCEDURE — 83735 ASSAY OF MAGNESIUM: CPT | Performed by: NURSE PRACTITIONER

## 2022-07-28 PROCEDURE — 96375 TX/PRO/DX INJ NEW DRUG ADDON: CPT

## 2022-07-28 PROCEDURE — 99900035 HC TECH TIME PER 15 MIN (STAT)

## 2022-07-28 PROCEDURE — 85025 COMPLETE CBC W/AUTO DIFF WBC: CPT | Performed by: NURSE PRACTITIONER

## 2022-07-28 PROCEDURE — 96376 TX/PRO/DX INJ SAME DRUG ADON: CPT

## 2022-07-28 PROCEDURE — G0378 HOSPITAL OBSERVATION PER HR: HCPCS

## 2022-07-28 PROCEDURE — 80048 BASIC METABOLIC PNL TOTAL CA: CPT | Performed by: NURSE PRACTITIONER

## 2022-07-28 PROCEDURE — 25000003 PHARM REV CODE 250: Performed by: INTERNAL MEDICINE

## 2022-07-28 PROCEDURE — 25000003 PHARM REV CODE 250: Performed by: NURSE PRACTITIONER

## 2022-07-28 PROCEDURE — 83036 HEMOGLOBIN GLYCOSYLATED A1C: CPT | Performed by: NURSE PRACTITIONER

## 2022-07-28 PROCEDURE — 94761 N-INVAS EAR/PLS OXIMETRY MLT: CPT

## 2022-07-28 PROCEDURE — 96372 THER/PROPH/DIAG INJ SC/IM: CPT | Performed by: NURSE PRACTITIONER

## 2022-07-28 RX ORDER — FUROSEMIDE 10 MG/ML
40 INJECTION INTRAMUSCULAR; INTRAVENOUS 2 TIMES DAILY
Status: DISCONTINUED | OUTPATIENT
Start: 2022-07-28 | End: 2022-07-31 | Stop reason: HOSPADM

## 2022-07-28 RX ORDER — PANTOPRAZOLE SODIUM 40 MG/1
40 TABLET, DELAYED RELEASE ORAL DAILY
Status: DISCONTINUED | OUTPATIENT
Start: 2022-07-28 | End: 2022-07-31 | Stop reason: HOSPADM

## 2022-07-28 RX ORDER — ENOXAPARIN SODIUM 100 MG/ML
40 INJECTION SUBCUTANEOUS EVERY 24 HOURS
Status: DISCONTINUED | OUTPATIENT
Start: 2022-07-28 | End: 2022-07-31 | Stop reason: HOSPADM

## 2022-07-28 RX ORDER — QUETIAPINE FUMARATE 100 MG/1
200 TABLET, FILM COATED ORAL NIGHTLY
Status: DISCONTINUED | OUTPATIENT
Start: 2022-07-28 | End: 2022-07-31 | Stop reason: HOSPADM

## 2022-07-28 RX ORDER — ASPIRIN 81 MG/1
81 TABLET ORAL DAILY
Status: DISCONTINUED | OUTPATIENT
Start: 2022-07-28 | End: 2022-07-31 | Stop reason: HOSPADM

## 2022-07-28 RX ORDER — OXYCODONE AND ACETAMINOPHEN 7.5; 325 MG/1; MG/1
1 TABLET ORAL EVERY 4 HOURS PRN
Status: DISCONTINUED | OUTPATIENT
Start: 2022-07-28 | End: 2022-07-30

## 2022-07-28 RX ORDER — TALC
6 POWDER (GRAM) TOPICAL NIGHTLY PRN
Status: DISCONTINUED | OUTPATIENT
Start: 2022-07-28 | End: 2022-07-31 | Stop reason: HOSPADM

## 2022-07-28 RX ORDER — ONDANSETRON 2 MG/ML
4 INJECTION INTRAMUSCULAR; INTRAVENOUS EVERY 8 HOURS PRN
Status: DISCONTINUED | OUTPATIENT
Start: 2022-07-28 | End: 2022-07-31 | Stop reason: HOSPADM

## 2022-07-28 RX ORDER — ATORVASTATIN CALCIUM 40 MG/1
80 TABLET, FILM COATED ORAL NIGHTLY
Status: DISCONTINUED | OUTPATIENT
Start: 2022-07-28 | End: 2022-07-31 | Stop reason: HOSPADM

## 2022-07-28 RX ORDER — ACETAMINOPHEN 325 MG/1
650 TABLET ORAL EVERY 6 HOURS PRN
Status: DISCONTINUED | OUTPATIENT
Start: 2022-07-28 | End: 2022-07-31 | Stop reason: HOSPADM

## 2022-07-28 RX ORDER — FUROSEMIDE 10 MG/ML
40 INJECTION INTRAMUSCULAR; INTRAVENOUS DAILY
Status: DISCONTINUED | OUTPATIENT
Start: 2022-07-28 | End: 2022-07-28

## 2022-07-28 RX ORDER — HYDROMORPHONE HYDROCHLORIDE 1 MG/ML
0.5 INJECTION, SOLUTION INTRAMUSCULAR; INTRAVENOUS; SUBCUTANEOUS EVERY 4 HOURS PRN
Status: DISCONTINUED | OUTPATIENT
Start: 2022-07-28 | End: 2022-07-31 | Stop reason: HOSPADM

## 2022-07-28 RX ORDER — SODIUM CHLORIDE 0.9 % (FLUSH) 0.9 %
10 SYRINGE (ML) INJECTION
Status: DISCONTINUED | OUTPATIENT
Start: 2022-07-28 | End: 2022-07-31 | Stop reason: HOSPADM

## 2022-07-28 RX ORDER — HYDROCODONE BITARTRATE AND ACETAMINOPHEN 5; 325 MG/1; MG/1
1 TABLET ORAL ONCE
Status: COMPLETED | OUTPATIENT
Start: 2022-07-28 | End: 2022-07-28

## 2022-07-28 RX ADMIN — FUROSEMIDE 40 MG: 10 INJECTION, SOLUTION INTRAMUSCULAR; INTRAVENOUS at 08:07

## 2022-07-28 RX ADMIN — ATORVASTATIN CALCIUM 80 MG: 40 TABLET, FILM COATED ORAL at 08:07

## 2022-07-28 RX ADMIN — FUROSEMIDE 40 MG: 10 INJECTION, SOLUTION INTRAMUSCULAR; INTRAVENOUS at 06:07

## 2022-07-28 RX ADMIN — ASPIRIN 81 MG: 81 TABLET, COATED ORAL at 08:07

## 2022-07-28 RX ADMIN — HYDROMORPHONE HYDROCHLORIDE 0.5 MG: 1 INJECTION, SOLUTION INTRAMUSCULAR; INTRAVENOUS; SUBCUTANEOUS at 06:07

## 2022-07-28 RX ADMIN — QUETIAPINE FUMARATE 200 MG: 100 TABLET ORAL at 08:07

## 2022-07-28 RX ADMIN — Medication 6 MG: at 08:07

## 2022-07-28 RX ADMIN — LEVOTHYROXINE SODIUM 137 MCG: 25 TABLET ORAL at 08:07

## 2022-07-28 RX ADMIN — HYDROMORPHONE HYDROCHLORIDE 0.5 MG: 1 INJECTION, SOLUTION INTRAMUSCULAR; INTRAVENOUS; SUBCUTANEOUS at 01:07

## 2022-07-28 RX ADMIN — PANTOPRAZOLE SODIUM 40 MG: 40 TABLET, DELAYED RELEASE ORAL at 08:07

## 2022-07-28 RX ADMIN — ENOXAPARIN SODIUM 40 MG: 100 INJECTION SUBCUTANEOUS at 04:07

## 2022-07-28 RX ADMIN — HYDROMORPHONE HYDROCHLORIDE 0.5 MG: 1 INJECTION, SOLUTION INTRAMUSCULAR; INTRAVENOUS; SUBCUTANEOUS at 10:07

## 2022-07-28 RX ADMIN — HYDROCODONE BITARTRATE AND ACETAMINOPHEN 1 TABLET: 5; 325 TABLET ORAL at 08:07

## 2022-07-28 NOTE — PROGRESS NOTES
Wound care consult received for BLE chronic lymphedema. Pt with compression wraps to BLE that were placed on Tuesday.  Pt reports her lymphedema is treated by podiatrist, Dr. Mcguire.   Will place orders to change wraps 2x/week- Tuesdays and Friday and notified Kevin MERCHANT.

## 2022-07-28 NOTE — CONSULTS
Food & Nutrition  Education    Diet Education: For Education on Fluid and Salt Restriction, CHF diet edu  Time Spent: 5 minutes  Learners: Patient       Nutrition Education provided with handouts:       Comments: Pt receiving Low Na, 2 gm diet with 1500 mL fluid restriction, 75% intake of meals. Pt is [place] per RN. Pt being taken FUNMI at time of RD visit, and then still FUNMI when RD returned. Will follow up to discuss education handouts left at bedside. Prior to pt leaving room, RD informed her the handouts have the key information highlighted and that it explains a heart healthy diet with fluid restriction and weight monitoring. RD has educated pt during past admissions over the past 2 years.        All questions and concerns answered. Dietitian's contact information provided.       Follow-Up: 1 x/week     Please Re-consult as needed        Thanks!

## 2022-07-28 NOTE — ASSESSMENT & PLAN NOTE
-Patient with known CAD which is controlled   -Will continue ASA and Statin and monitor for S/Sx of angina/ACS.   -Continue to monitor on telemetry.   -Followed by [facility]

## 2022-07-28 NOTE — PLAN OF CARE
SW met with pt at d/c to complete DCA. Pt stated that she lives at home with her  Dangelo [phone]. Pt reported that her  will help transport her home at time of d/c. Pt stated that she has a WC, Rollator, SC, BSC, and oxygen supplied by [facility]. SW will request f/u appts. White board updated with CM name and contact information.  Discharge brochure provided.  Pt encouraged to call with any questions or concerns.  Cm will continue to follow pt through transitions of care and assist with any discharge needs.    Torres Jostin, [facility]  [phone]    Future Appointments   Date Time Provider Department Center   7/29/2022  8:30 AM Juan A Madera MD [facility] PSYCH [facility]   7/29/2022  3:00 PM Robert Sims DO [facility]   8/1/2022  3:40 PM Shireen Mayo MD [facility] UROLOGY [facility]   8/19/2022 11:00 AM Mesfin Hodges II, MD [facility]        07/28/22 1011   Discharge Assessment   Assessment Type Discharge Planning Assessment   Confirmed/corrected address, phone number and insurance Yes   Confirmed Demographics Correct on Facesheet   Source of Information patient   When was your last doctors appointment?   (per pt she is unsure)   Does patient/caregiver understand observation status Yes   Reason For Admission Acute on chronic diastolic heart failure   Lives With spouse   Facility Arrived From: HOME   Do you expect to return to your current living situation? Yes   Do you have help at home or someone to help you manage your care at home? Yes   Who are your caregiver(s) and their phone number(s)?  Dangelo [phone]   Prior to hospitilization cognitive status: Alert/Oriented   Current cognitive status: Alert/Oriented   Walking or Climbing Stairs Difficulty none;ambulation difficulty, requires equipment   Dressing/Bathing Difficulty bathing difficulty, requires equipment   Home Accessibility wheelchair accessible   Home Layout Able to live on 1st floor   Equipment Currently Used at Home  wheelchair;rollator;shower chair;bedside commode;oxygen   Readmission within 30 days? No   Patient currently being followed by outpatient case management? No   Do you currently have service(s) that help you manage your care at home? Yes   Name and Contact number of agency [facility]   Is the pt/caregiver preference to resume services with current agency Yes   Do you take prescription medications? Yes   Do you have prescription coverage? Yes   Coverage [employer]   Do you have any problems affording any of your prescribed medications? No   Is the patient taking medications as prescribed? yes   Who is going to help you get home at discharge?  Dangelo [phone]   How do you get to doctors appointments? family or friend will provide   Are you on dialysis? No   Do you take coumadin? No   Discharge Plan A Home with family   DME Needed Upon Discharge  none   Discharge Plan discussed with: Patient   Discharge Barriers Identified None   Relationship/Environment   Name(s) of Who Lives With Patient  Dangelo [phone]

## 2022-07-28 NOTE — PLAN OF CARE
07/28/22 0401   Admission   Initial VN Admission Questions Complete   Communication Issues? Patient Hearing  (Phone admit)   Shift   Virtual Nurse - Rounding Complete   Pain Management Interventions relaxation techniques promoted;quiet environment facilitated   Virtual Nurse - Patient Verbalized Approval Of VN Rounding;Camera Use   Type of Frequent Check   Type Patient Rounds;Telemetry Monitoring   Safety/Activity   Patient Rounds bed in low position;placement of personal items at bedside;bed wheels locked;call light in patient/parent reach;visualized patient;clutter free environment maintained;ID band on   Safety Promotion/Fall Prevention assistive device/personal item within reach;bed alarm set;Fall Risk signage in place;nonskid shoes/socks when out of bed;side rails raised x 2;instructed to call staff for mobility   Safety Precautions emergency equipment at bedside   Activity Management Rolling - L1   Positioning   Body Position position changed independently   Head of Bed (HOB) Positioning HOB elevated   Positioning/Transfer Devices pillows;in use   Pain/Comfort/Sleep   Sleep/Rest/Relaxation no problem identified    VN cued into room by telephone and [place] to complete admit assessment. VIP model introduced; VN working alongside bedside treatment team.  Plan of care reviewed with patient. Patient informed of fall risk, fall precautions, call light within reach, side rails x2 elevated. Patient notified to ask staff for assistance. Patient verbalized complete understanding. Time allowed for questions. Will continue to monitor and intervene as needed.

## 2022-07-28 NOTE — ASSESSMENT & PLAN NOTE
Body mass index is 34.67 kg/m². Morbid obesity complicates all aspects of disease management from diagnostic modalities to treatment. Weight loss encouraged and health benefits explained to patient.

## 2022-07-28 NOTE — ASSESSMENT & PLAN NOTE
Urinary retention  -Suprapubic catheter in place  -Changes every month   - Followed by urology ([facility]

## 2022-07-28 NOTE — HPI
Tasha Hawley is a 66-year-old female with a history of Anxiety, Arthritis, carotid artery occlusion, CHF, CAD (subtotaled LAD with collateral), depression, hypothyroid, Iron deficiency anemia, renal disorder, sleep apnea on CPAP, anticoagulant long-term use, and chronically severe bilateral lower extremity edema who presented to the ED for complaint of shortness of breath, 25 lb weight gain over the last few days, worsening bilateral lower extremity swelling, and hand swelling. She reports taking lasix 20 mg orally twice daily without improvement. She reports she went to her doctor today and was instructed to come to the ED for possible admission for heart failure exacerbation. Patient denies chills, fevers, nausea, vomiting, abdominal pain and all other symptoms currently.    In the ED: BNP 17, troponin 0.006. CXR: Cardiomegaly and findings suggestive of interstitial edema/CHF, similar to prior examination. Given 80 mg IV lasix. Patient on RA sats 92-96%. Admitted for observation to [facility] for further evaluation.

## 2022-07-28 NOTE — PLAN OF CARE
Problem: Adult Inpatient Plan of Care  Goal: Plan of Care Review  Outcome: Ongoing, Progressing  Goal: Patient-Specific Goal (Individualized)  Outcome: Ongoing, Progressing  Goal: Absence of Hospital-Acquired Illness or Injury  Outcome: Ongoing, Progressing  Goal: Optimal Comfort and Wellbeing  Outcome: Ongoing, Progressing  Goal: Readiness for Transition of Care  Outcome: Ongoing, Progressing     Problem: Skin Injury Risk Increased  Goal: Skin Health and Integrity  Outcome: Ongoing, Progressing     Problem: Heart Failure Comorbidity  Goal: Maintenance of Heart Failure Symptom Control  Outcome: Ongoing, Progressing

## 2022-07-28 NOTE — PLAN OF CARE
VN Note: Labs, notes, orders, care plan review will continue to monitor.   Problem: Adult Inpatient Plan of Care  Goal: Plan of Care Review  Outcome: Ongoing, Progressing

## 2022-07-28 NOTE — ASSESSMENT & PLAN NOTE
-Worsening BLE edema up to thighs with legs wrapped  -Takes lasix 20 mg BID at home without much relief  -See CHF

## 2022-07-28 NOTE — SUBJECTIVE & OBJECTIVE
Past Medical History:   Diagnosis Date    Anticoagulant long-term use     Anxiety     Arthritis     Bilateral lower extremity edema     severe chronic    Carotid artery occlusion     Cataract     CHF (congestive heart failure)     Coronary artery disease     subtotalled LAD with collateral    Depression     Fever blister     Hypothyroid     Iron deficiency anemia     Lumbar radiculopathy     with chronic pain    Ocular migraine     Renal disorder     Sleep apnea     cpap       Past Surgical History:   Procedure Laterality Date    ADENOIDECTOMY      BACK SURGERY      x 12    CARDIAC CATHETERIZATION  2016    subtotalled LAD with right to left collaterals    CATARACT EXTRACTION W/  INTRAOCULAR LENS IMPLANT Left     Dr Coleman     CYSTOSCOPIC LITHOLAPAXY N/A 6/27/2019    Procedure: CYSTOLITHOLAPAXY;  Surgeon: Shireen Mayo MD;  Location: [facility] OR 2ND FLR;  Service: Urology;  Laterality: N/A;    CYSTOSCOPIC LITHOLAPAXY N/A 9/3/2019    Procedure: CYSTOLITHOLAPAXY;  Surgeon: Shireen Mayo MD;  Location: [facility] OR 2ND FLR;  Service: Urology;  Laterality: N/A;    CYSTOSCOPY N/A 7/13/2021    Procedure: CYSTOSCOPY;  Surgeon: Shireen Mayo MD;  Location: [facility] OR 1ST FLR;  Service: Urology;  Laterality: N/A;    CYSTOSCOPY  11/16/2021    Procedure: CYSTOSCOPY;  Surgeon: Shireen Mayo MD;  Location: [facility] OR 1ST FLR;  Service: Urology;;    CYSTOSCOPY  7/19/2022    Procedure: CYSTOSCOPY;  Surgeon: Shireen Mayo MD;  Location: [facility] OR 1ST FLR;  Service: Urology;;    CYSTOSCOPY WITH INJECTION OF PERIURETHRAL BULKING AGENT  7/19/2022    Procedure: CYSTOSCOPY, WITH PERIURETHRAL BULKING AGENT INJECTION-MACROPLASTIQUE;  Surgeon: Shireen Mayo MD;  Location: [facility] OR 1ST FLR;  Service: Urology;;    ESOPHAGOGASTRODUODENOSCOPY N/A 5/23/2018    Procedure: ESOPHAGOGASTRODUODENOSCOPY (EGD);  Surgeon: Prince Vance MD;  Location: [facility] (4TH FLR);  Service: Endoscopy;  Laterality: N/A;  r/s 'd per Dr. Vance due to  family emergency- ER    HYSTERECTOMY  1975    endometriosis    INJECTION OF BOTULINUM TOXIN TYPE A  7/13/2021    Procedure: INJECTION, BOTULINUM TOXIN, 200units;  Surgeon: Shireen Mayo MD;  Location: [facility] OR [address] [place] [address];  Service: Urology;;    INJECTION OF BOTULINUM TOXIN TYPE A  11/16/2021    Procedure: INJECTION, BOTULINUM TOXIN, 200units;  Surgeon: Shireen Mayo MD;  Location: [facility] OR [address] [place] [address];  Service: Urology;;    INJECTION OF BOTULINUM TOXIN TYPE A  7/19/2022    Procedure: INJECTION, BOTULINUM TOXIN, 300 units ;  Surgeon: Shireen Mayo MD;  Location: [facility] OR [address] [place] [address];  Service: Urology;;    pain pump placement      SQ Dilaudid Pump managed by Dr. Hillman, Pain Management    REPLACEMENT OF CATHETER N/A 10/31/2019    Procedure: REPLACEMENT, CATHETER-SUPRAPUBIC;  Surgeon: Shireen Mayo MD;  Location: [facility] OR [address] [place] [address];  Service: Urology;  Laterality: N/A;    SPINAL CORD STIMULATOR REMOVAL      before [facility]    SPINE SURGERY  5-13-13    CERVICAL FUSION    TONSILLECTOMY         Review of patient's allergies indicates:   Allergen Reactions    (d)-limonene flavor      Other reaction(s): difficult intubation  Other reaction(s): Difficulty breathing    Bactrim [sulfamethoxazole-trimethoprim] Anaphylaxis    Benadryl [diphenhydramine hcl] Shortness Of Breath    Fentanyl Itching, Nausea And Vomiting and Swelling             Imitrex [sumatriptan succinate] Shortness Of Breath    Topamax [topiramate] Shortness Of Breath    Vancomycin Shortness Of Breath     Rash    Butorphanol tartrate     Darvocet a500 [propoxyphene n-acetaminophen]      Other reaction(s): Difficulty breathing    White petrolatum-zinc     Zinc oxide-white petrolatum      Other reaction(s): Difficulty breathing    Latex, natural rubber Itching and Rash    Phenytoin Rash and Other (See Comments)     Trouble breathing       No current facility-administered medications on file prior to encounter.     Current Outpatient Medications on File Prior to  Encounter   Medication Sig    acyclovir 5% (ZOVIRAX) 5 % ointment Apply topically 5 (five) times daily.    aspirin (ECOTRIN) 81 MG EC tablet Take 1 tablet (81 mg total) by mouth once daily.    atorvastatin (LIPITOR) 80 MG tablet TAKE ONE TABLET BY MOUTH EVERY DAY (Patient taking differently: Take by mouth every evening.)    butalbital-acetaminophen-caffeine -40 mg (FIORICET, ESGIC) -40 mg per tablet Take 1 tablet by mouth daily as needed.    FLUoxetine 20 MG capsule Take 3 capsules (60 mg total) by mouth once daily.    furosemide (LASIX) 20 MG tablet Take 1 tablet (20 mg total) by mouth once daily.    HYDROcodone-acetaminophen (NORCO)  mg per tablet Take by mouth every 24 hours as needed.    intrathecal pain pump compound Hydromorphone (7.5 mg/mL) infusion at 3.6799 mg/day (0.1533 mg/hr) out of a total reservoir volume of 37.3 mL  Pump filled every 2 months    levothyroxine (SYNTHROID) 137 MCG Tab tablet Take 1 tablet (137 mcg total) by mouth before breakfast.    lidocaine (LIDODERM) 5 % daily as needed.     nitroGLYCERIN (NITROSTAT) 0.4 MG SL tablet Place 1 tablet (0.4 mg total) under the tongue every 5 (five) minutes as needed for Chest pain.    ondansetron (ZOFRAN-ODT) 4 MG TbDL Take by mouth.    pantoprazole (PROTONIX) 40 MG tablet TAKE ONE TABLET BY MOUTH EVERY DAY (Patient taking differently: Take by mouth every morning.)    potassium chloride SA (K-DUR,KLOR-CON) 20 MEQ tablet Take 1 tablet (20 mEq total) by mouth 2 (two) times daily.    promethazine (PHENERGAN) 25 MG tablet Take 25 mg by mouth every 6 (six) hours as needed for Nausea.    QUEtiapine (SEROQUEL) 100 MG Tab TAKE 2 TABLETS (200 MG) BY MOUTH NIGHTLY    senna (SENNA LAX) 8.6 mg tablet Take 2 tablets by mouth 2 (two) times daily.    tiZANidine (ZANAFLEX) 4 MG tablet Take 4 mg by mouth 2 (two) times daily.    traZODone (DESYREL) 100 MG tablet Take 3 tablets (300 mg total) by mouth every evening.    vibegron (GEMTESA) 75 mg Tab Take 1  tablet by mouth Daily.    [DISCONTINUED] torsemide (DEMADEX) 100 MG Tab TAKE ONE TABLET BY MOUTH EVERY DAY     Family History       Problem Relation (Age of Onset)    Cancer Mother (55), Father    Cirrhosis Paternal Aunt, Paternal Uncle    Colon cancer Maternal Uncle    Esophageal cancer Father    Heart disease Maternal Uncle    Liver disease Paternal Aunt, Paternal Uncle    No Known Problems Brother, Brother, Sister, Maternal Aunt, Maternal Grandfather, Paternal Grandmother, Paternal Grandfather    Parkinsonism Maternal Grandmother    Tremor Maternal Grandmother          Tobacco Use    Smoking status: Never Smoker    Smokeless tobacco: Never Used   Substance and Sexual Activity    Alcohol use: Never    Drug use: No    Sexual activity: Never     Partners: Male     Review of Systems   Constitutional:  Positive for activity change, fatigue and unexpected weight change.   HENT: Negative.     Eyes: Negative.    Respiratory:  Positive for shortness of breath. Negative for wheezing.    Cardiovascular:  Positive for leg swelling.   Gastrointestinal: Negative.    Genitourinary: Negative.    Musculoskeletal:  Positive for back pain.   Skin:  Positive for color change.   Neurological: Negative.    Psychiatric/Behavioral: Negative.     Objective:     Vital Signs (Most Recent):  Temp: 98.3 °F (36.8 °C) (07/27/22 1805)  Pulse: 71 (07/28/22 0004)  Resp: 20 (07/28/22 0004)  BP: (!) 102/55 (07/28/22 0004)  SpO2: 95 % (07/28/22 0004)   Vital Signs (24h Range):  Temp:  [98.3 °F (36.8 °C)] 98.3 °F (36.8 °C)  Pulse:  [60-71] 71  Resp:  [18-20] 20  SpO2:  [92 %-96 %] 95 %  BP: ()/(53-80) 102/55     Weight: 91.6 kg (202 lb)  Body mass index is 34.67 kg/m².    Physical Exam  Vitals and nursing note reviewed.   Constitutional:       General: She is sleeping.      Appearance: She is ill-appearing.   HENT:      Head: Normocephalic and atraumatic.      Mouth/Throat:      Mouth: Mucous membranes are dry.   Eyes:      Extraocular  Movements: Extraocular movements intact.      Pupils: Pupils are equal, round, and reactive to light.   Cardiovascular:      Rate and Rhythm: Normal rate and regular rhythm.      Pulses: Normal pulses.      Heart sounds: Normal heart sounds.   Pulmonary:      Effort: No respiratory distress.      Breath sounds: Normal breath sounds. No wheezing.   Abdominal:      General: Bowel sounds are normal. There is no distension.      Palpations: Abdomen is soft.      Tenderness: There is no abdominal tenderness.   Genitourinary:     Comments: Suprapubic catheter in place  Musculoskeletal:         General: Tenderness present. Normal range of motion.      Cervical back: Normal range of motion.      Right lower leg: Edema present.      Left lower leg: Edema present.      Comments: BLE edema up to thighs with legs wrapped   Skin:     General: Skin is warm and dry.      Capillary Refill: Capillary refill takes 2 to 3 seconds.   Neurological:      General: No focal deficit present.      Mental Status: She is oriented to person, place, and time and easily aroused. Mental status is at baseline.   Psychiatric:         Mood and Affect: Mood normal.         Behavior: Behavior normal.         Thought Content: Thought content normal.         Judgment: Judgment normal.         CRANIAL NERVES     CN III, IV, VI   Pupils are equal, round, and reactive to light.     Significant Labs: All pertinent labs within the past 24 hours have been reviewed.    Significant Imaging: I have reviewed all pertinent imaging results/findings within the past 24 hours.

## 2022-07-28 NOTE — CARE UPDATE
Ms. Hawley is a 66 YOF with PMHx of history CVA with dysarthria, anxiety and depression, chronic insomnia, hypothyroidism, CECI not on CPAP, chronic low back pain with indwelling narcotic epidural pump, chronic constipation likely related to narcotic use, neurogenic bladder with suprapubic catheter (followed by Urology), recurrent UTI's, CAD s/p PCI (midLAD PCI in 2016), ICM, and chronic BLE lymphedema.     She presented to [facility] ED from  due to shortness of breath, 25 lb weight gain over the last few days, worsening bilateral lower extremity swelling, and hand swelling. She reported compliance with home medications including diuretic however does endorse noncompliance with low sodium diet and has high salt loads on occasion.     TTE 7/2022:  · Concentric remodeling and normal systolic function.  · The estimated ejection fraction is 60%.  · Normal left ventricular diastolic function.  · Normal right ventricular size with normal right ventricular systolic function.  · Mild tricuspid regurgitation.  · Mild to moderate pulmonic regurgitation.  · Normal central venous pressure (3 mmHg).  · The estimated PA systolic pressure is 31 mmHg.    Plan (not all inclusive):  -CHF pathway initiated at admission  -HDS at admission, remains so  -BLE edema noted, wrapped and compression stockings in place  -diuresis initiated IVP lasix >> continue  -hold home PO lasix   -elevate extremities as able   -currently net negative ~4.2 liters  -no further home GDMT for CHF although EF now recovered at 60%  -consider Cardiology consult in AM if refractory to current plan of care  -RD consulted for dietary education  -Wound Care consulted for LE edema evaluation and treatment   -PT/OT evaluations ordered   -referral to Outpt Lymphedema therapy placed  -continue tele monitoring  -cardiac diet with fluid restriction  -strict I&Os and daily weights  -labs in AM  -see EHR for all orders and plans      Yamile Mcdaniel, DNP, AG-ACNP,  BC  Department of Hospital Medicine  [facility]

## 2022-07-28 NOTE — ED NOTES
"Pt c/o BLE edema and pain x "weeks." Pt treated at 3 emergency rooms for same issue this week. Severe edema noted, compression stockings in place. Pt has hx of chronic edema and cancer. Suprapubic catheter in place. Pt reports taking medications as prescribed w/o relief. Pt AAOx3. Respirations even and unlabored. Skin is warm and dry. Pts spouse at bedside. CB within reach.  "

## 2022-07-28 NOTE — ASSESSMENT & PLAN NOTE
Patient presents with shortness of breath and JONES and peripheral edema on exam  Last ECHO results:   Results for orders placed during the hospital encounter of 04/14/22    Echo    Interpretation Summary  · The left ventricle is normal in size with normal systolic function.  · The estimated ejection fraction is 55%.  · Mild mitral regurgitation.  · Normal left ventricular diastolic function.  · Mild tricuspid regurgitation.  · Normal right ventricular size with normal right ventricular systolic function.  · There is no pulmonary hypertension.    -Consistent clinical presentation; BNP 17  -Troponin 0.006  -CHF pathway initiated  -CXR revealed cardiomegaly and findings suggestive of interstitial edema/CHF, similar to prior examination.  -Initiated lasix 80 mg in ED, will continue Lasix IV at 40 mg daily  -Daily weights  -Strict I/Os  -Monitor on telemetry  -Monitor and trend BMP, Mg, and renal function; keep K >4, Mg >2  -Sodium restriction (<2g/d), fluid restriction (<2L)   -2D echo to assess cardiac function and valvular dysfunction.   -Monitor for signs of fluid overload: RR>30, O2 sat<92%, weight gain of >3 lbs in 24 hours, or urinary output <160ml/8hr

## 2022-07-28 NOTE — ASSESSMENT & PLAN NOTE
Generalized anxiety disorder  Patient has recurrent depression which is mild and is currently controlled. Will Continue anti-depressant medications. Continue home anxiety medications. We will not consult psychiatry at this time. Patient does not display psychosis at this time. Continue to monitor closely and adjust plan of care as needed.

## 2022-07-28 NOTE — ED PROVIDER NOTES
"Encounter Date: 7/27/2022       History     Chief Complaint   Patient presents with    Leg Swelling     C/O generalized increase in edema to BLE, BUE, and "just all over", hx of CHF, currently on lasix with no improvement, reports seen in ED multiple times for same, also reports irritation to suprapubic catheter     HPI   66-year-old female presenting with 25 lb weight gain, bilateral lower extremity swelling, hand swelling, taking Lasix 20 mg twice a day without improvement  Patient states that she went to her doctor today and was told to come to the emergency department for possible admission, also complaining of leg pain and asking for dilaudid  Denies chest pain, n/v, fevers  Review of patient's allergies indicates:   Allergen Reactions    (d)-limonene flavor      Other reaction(s): difficult intubation  Other reaction(s): Difficulty breathing    Bactrim [sulfamethoxazole-trimethoprim] Anaphylaxis    Benadryl [diphenhydramine hcl] Shortness Of Breath    Fentanyl Itching, Nausea And Vomiting and Swelling             Imitrex [sumatriptan succinate] Shortness Of Breath    Topamax [topiramate] Shortness Of Breath    Vancomycin Shortness Of Breath     Rash    Butorphanol tartrate     Darvocet a500 [propoxyphene n-acetaminophen]      Other reaction(s): Difficulty breathing    White petrolatum-zinc     Zinc oxide-white petrolatum      Other reaction(s): Difficulty breathing    Latex, natural rubber Itching and Rash    Phenytoin Rash and Other (See Comments)     Trouble breathing     Past Medical History:   Diagnosis Date    Anticoagulant long-term use     Anxiety     Arthritis     Bilateral lower extremity edema     severe chronic    Carotid artery occlusion     Cataract     CHF (congestive heart failure)     Coronary artery disease     subtotalled LAD with collateral    Depression     Fever blister     Hypothyroid     Iron deficiency anemia     Lumbar radiculopathy     with chronic pain    " Ocular migraine     Renal disorder     Sleep apnea     cpap     Past Surgical History:   Procedure Laterality Date    ADENOIDECTOMY      BACK SURGERY      x 12    CARDIAC CATHETERIZATION  2016    subtotalled LAD with right to left collaterals    CATARACT EXTRACTION W/  INTRAOCULAR LENS IMPLANT Left     Dr Coleman     CYSTOSCOPIC LITHOLAPAXY N/A 6/27/2019    Procedure: CYSTOLITHOLAPAXY;  Surgeon: Shireen Mayo MD;  Location: [facility] OR 2ND FLR;  Service: Urology;  Laterality: N/A;    CYSTOSCOPIC LITHOLAPAXY N/A 9/3/2019    Procedure: CYSTOLITHOLAPAXY;  Surgeon: Shireen Mayo MD;  Location: [facility] OR 2ND FLR;  Service: Urology;  Laterality: N/A;    CYSTOSCOPY N/A 7/13/2021    Procedure: CYSTOSCOPY;  Surgeon: Shireen Mayo MD;  Location: [facility] OR 1ST FLR;  Service: Urology;  Laterality: N/A;    CYSTOSCOPY  11/16/2021    Procedure: CYSTOSCOPY;  Surgeon: Shireen Mayo MD;  [facility];  Service: Urology;;    CYSTOSCOPY  7/19/2022    Procedure: CYSTOSCOPY;  Surgeon: Shireen Mayo MD;  [facility];  Service: Urology;;    CYSTOSCOPY WITH INJECTION OF PERIURETHRAL BULKING AGENT  7/19/2022    Procedure: CYSTOSCOPY, WITH PERIURETHRAL BULKING AGENT INJECTION-MACROPLASTIQUE;  Surgeon: Shireen Mayo MD;  [facility];  Service: Urology;;    ESOPHAGOGASTRODUODENOSCOPY N/A 5/23/2018    Procedure: ESOPHAGOGASTRODUODENOSCOPY (EGD);  Surgeon: Prince Vance MD;  Location: [facility] (4TH FLR);  Service: Endoscopy;  Laterality: N/A;  r/s 'd per Dr. Vance due to family emergency- ER    HYSTERECTOMY  1975    endometriosis    INJECTION OF BOTULINUM TOXIN TYPE A  7/13/2021    Procedure: INJECTION, BOTULINUM TOXIN, 200units;  Surgeon: Shireen Mayo MD;  Location: [facility] OR 1ST FLR;  Service: Urology;;    INJECTION OF BOTULINUM TOXIN TYPE A  11/16/2021    Procedure: INJECTION, BOTULINUM TOXIN, 200units;  Surgeon: Shireen Mayo MD;  [facility]: [facility] OR [facility];  Service: Urology;;    INJECTION OF BOTULINUM TOXIN TYPE A  7/19/2022    Procedure: INJECTION, BOTULINUM TOXIN, 300 units ;  Surgeon: Shireen Mayo MD;  Location: [facility] OR [address] [place] [address];  Service: Urology;;    pain pump placement      SQ Dilaudid Pump managed by Dr. Hillman, Pain Management    REPLACEMENT OF CATHETER N/A 10/31/2019    Procedure: REPLACEMENT, CATHETER-SUPRAPUBIC;  Surgeon: Shireen Mayo MD;  Location: [facility] OR [address] [place] [address];  Service: Urology;  Laterality: N/A;    SPINAL CORD STIMULATOR REMOVAL      before [place]    SPINE SURGERY  5-13-13    CERVICAL FUSION    TONSILLECTOMY       Family History   Problem Relation Age of Onset    Cancer Mother 55        breast    Cancer Father         esophagus,had laryngectomy    Esophageal cancer Father     Parkinsonism Maternal Grandmother     Tremor Maternal Grandmother     No Known Problems Brother     No Known Problems Brother     Heart disease Maternal Uncle     Colon cancer Maternal Uncle         Less than 60    No Known Problems Sister     No Known Problems Maternal Aunt     Cirrhosis Paternal Aunt         ETOH    Liver disease Paternal Aunt         ETOH    Liver disease Paternal Uncle         ETOH    Cirrhosis Paternal Uncle         ETOH    No Known Problems Maternal Grandfather     No Known Problems Paternal Grandmother     No Known Problems Paternal Grandfather     Melanoma Neg Hx     Amblyopia Neg Hx     Blindness Neg Hx     Cataracts Neg Hx     Diabetes Neg Hx     Glaucoma Neg Hx     Hypertension Neg Hx     Macular degeneration Neg Hx     Retinal detachment Neg Hx     Strabismus Neg Hx     Stroke Neg Hx     Thyroid disease Neg Hx     Celiac disease Neg Hx     Colon polyps Neg Hx     Cystic fibrosis Neg Hx     Crohn's disease Neg Hx     Inflammatory bowel disease Neg Hx     Liver cancer Neg Hx     Rectal cancer Neg Hx     Stomach cancer Neg Hx     Ulcerative colitis Neg Hx     Lymphoma Neg Hx      Social  History     Tobacco Use    Smoking status: Never Smoker    Smokeless tobacco: Never Used   Substance Use Topics    Alcohol use: Never    Drug use: No     Review of Systems   Constitutional: Positive for unexpected weight change. Negative for appetite change, chills, diaphoresis and fever.   HENT: Negative for congestion, nosebleeds, sore throat, trouble swallowing and voice change.    Eyes: Negative for photophobia, pain, redness and itching.   Respiratory: Negative for cough, chest tightness and shortness of breath.    Cardiovascular: Positive for leg swelling. Negative for chest pain.   Gastrointestinal: Negative for abdominal pain, constipation, diarrhea, nausea and vomiting.   Genitourinary: Negative for decreased urine volume, difficulty urinating, dysuria and frequency.   Musculoskeletal: Positive for back pain. Negative for gait problem.   Skin: Negative for color change, rash and wound.   Neurological: Negative for dizziness, facial asymmetry, speech difficulty and headaches.   Psychiatric/Behavioral: Negative for agitation, confusion and suicidal ideas.   All other systems reviewed and are negative.      Physical Exam     Initial Vitals [07/27/22 1805]   BP Pulse Resp Temp SpO2   (!) 94/53 62 18 98.3 °F (36.8 °C) (!) 93 %      MAP       --         Physical Exam    Nursing note and vitals reviewed.  Constitutional:   EXAM  General: Awake, alert and oriented. No acute distress.  Chronically ill-appearing.     Head: normocephalic and atraumatic     Eyes: Conjunctivae are clear without exudates or hemorrhage. Sclera is non-icteric. EOM are intact. Eyelids are normal in appearance without swelling or lesions.     Ears: The external ear and ear canal are non-tender and without swelling. The canal is clear without discharge. Hearing intact.     Nose: Nares are patent bilaterally.     Neck: The neck is supple. Trachea is midline. Full ROM.     Cardiac: Regular rate.     Respiratory: No signs of respiratory  distress. No audible wheezes.     Abdominal:  Slightly distended.     Extremities:  Swollen bilateral lower extremities.  Painful when palpated.     Skin: Appropriate color for ethnicity.     Neurological: The patient is awake, alert and oriented to person, place, and time with normal speech.     Psychiatric: Appropriate mood and affect.     In light of current/ongoing global covid-19 pandemic, all my encounters w pt were with full ppe including but not limited to gown, gloves, n95, eye protection OR from >6 ft away.             ED Course   Procedures  Labs Reviewed   CBC W/ AUTO DIFFERENTIAL - Abnormal; Notable for the following components:       Result Value    RBC 3.18 (*)     Hemoglobin 9.1 (*)     Hematocrit 28.6 (*)     MCHC 31.8 (*)     All other components within normal limits   COMPREHENSIVE METABOLIC PANEL - Abnormal; Notable for the following components:    CO2 30 (*)     BUN 7 (*)     Albumin 3.3 (*)     AST 9 (*)     ALT <5 (*)     All other components within normal limits   B-TYPE NATRIURETIC PEPTIDE   TROPONIN I     EKG Readings: (Independently Interpreted)   Normal sinus rhythm, rate 61, T-wave flattening lateral leads, normal intervals     ECG Results          EKG 12-lead (Final result)  Result time 07/28/22 08:47:39    Final result by Interface, Lab In [facility] (07/28/22 08:47:39)                 Narrative:    Test Reason : R06.02,    Vent. Rate : 061 BPM     Atrial Rate : 061 BPM     P-R Int : 184 ms          QRS Dur : 106 ms      QT Int : 472 ms       P-R-T Axes : 046 -05 010 degrees     QTc Int : 475 ms    Normal sinus rhythm  Cannot rule out Anterior infarct (cited on or before 25-JUL-2022)  Abnormal ECG  When compared with ECG of 25-JUL-2022 02:29,  No significant change was found  Confirmed by Saima Blanc MD ([id number]) on 7/28/2022 8:47:29 AM    Referred By: [email or web address]   SELF           Confirmed By:Saima Blanc MD                            Imaging Results          X-Ray Chest AP Portable (Final  result)  Result time 07/27/22 22:18:13    Final result by Alyce Palumbo MD (07/27/22 22:18:13)                 Impression:      Cardiomegaly and findings suggestive of interstitial edema/CHF, similar to prior examination.      Electronically signed by: Alyce Palumbo MD  Date:    07/27/2022  Time:    22:18             Narrative:    EXAMINATION:  XR CHEST AP PORTABLE    CLINICAL HISTORY:  CHF;    TECHNIQUE:  Single frontal view of the chest was performed.    COMPARISON:  07/25/2022    FINDINGS:  Cardiac monitoring leads overlie the chest.  There is unchanged enlargement of the cardiomediastinal silhouette.  Mediastinal structures are midline.  There is atherosclerosis and tortuosity of the thoracic aorta.  There are low lung volumes with increased interstitial and parenchymal attenuation with coarse bilateral perihilar opacities.  Overall findings appear similar to prior examination.  No confluent airspace consolidation, large volume of pleural fluid or pneumothorax is appreciated.  There is postoperative change of the visualized lower cervical spine.  Stimulator electrodes project over the thoracic spine and left upper quadrant.                                 Medications   sodium chloride 0.9% flush 10 mL (has no administration in time range)   enoxaparin injection 40 mg (40 mg Subcutaneous Given 7/28/22 1653)   ondansetron injection 4 mg (has no administration in time range)   melatonin tablet 6 mg (6 mg Oral Given 7/28/22 2043)   acetaminophen tablet 650 mg (has no administration in time range)   atorvastatin tablet 80 mg (80 mg Oral Given 7/28/22 2039)   levothyroxine tablet 137 mcg (137 mcg Oral Given 7/28/22 0812)   pantoprazole EC tablet 40 mg (40 mg Oral Given 7/28/22 0814)   QUEtiapine tablet 200 mg (200 mg Oral Given 7/28/22 2040)   aspirin EC tablet 81 mg (81 mg Oral Given 7/28/22 0814)   oxyCODONE-acetaminophen 7.5-325 mg per tablet 1 tablet (has no administration in time range)   HYDROmorphone injection  0.5 mg (0.5 mg Intravenous Given 7/28/22 1598)   furosemide injection 40 mg (40 mg Intravenous Given 7/28/22 1834)   furosemide injection 80 mg (80 mg Intravenous Given 7/27/22 2316)   HYDROcodone-acetaminophen 5-325 mg per tablet 1 tablet (1 tablet Oral Given 7/28/22 0817)     Medical Decision Making:   ED Management:  Concerned that patient is in CHF exacerbation.  Lasix ordered.  Plan for admission for diuresis.                      Clinical Impression:   Final diagnoses:  [R06.02] Shortness of breath  [I50.9] Acute on chronic congestive heart failure, unspecified heart failure type (Primary)          ED Disposition Condition    Observation               Shannon Kaur MD  07/28/22 [id number]

## 2022-07-28 NOTE — H&P
"[facility] Medicine  History & Physical    Patient Name: Tasha Hawley  MRN: [id number]  Patient Class: OP- Observation  Admission Date: 7/27/2022  Attending Physician: Braydon Martel MD   Primary Care Provider: Mesfin Hodges Ii, MD         Patient information was obtained from patient, past medical records and ER records.     Subjective:     Principal Problem:Acute on chronic diastolic heart failure    Chief Complaint:   Chief Complaint   Patient presents with    Leg Swelling     C/O generalized increase in edema to BLE, BUE, and "just all over", hx of CHF, currently on lasix with no improvement, reports seen in ED multiple times for same, also reports irritation to suprapubic catheter        HPI: Tasha Hawley is a 66-year-old female with a history of Anxiety, Arthritis, carotid artery occlusion, CHF, CAD (subtotaled LAD with collateral), depression, hypothyroid, Iron deficiency anemia, renal disorder, sleep apnea on CPAP, anticoagulant long-term use, and chronically severe bilateral lower extremity edema who presented to the ED for complaint of shortness of breath, 25 lb weight gain over the last few days, worsening bilateral lower extremity swelling, and hand swelling. She reports taking lasix 20 mg orally twice daily without improvement. She reports she went to her doctor today and was instructed to come to the ED for possible admission for heart failure exacerbation. Patient denies chills, fevers, nausea, vomiting, abdominal pain and all other symptoms currently.    In the ED: BNP 17, troponin 0.006. CXR: Cardiomegaly and findings suggestive of interstitial edema/CHF, similar to prior examination. Given 80 mg IV lasix. Patient on RA sats 92-96%. Admitted for observation to [facility] for further evaluation.          Past Medical History:   Diagnosis Date    Anticoagulant long-term use     Anxiety     Arthritis     Bilateral lower extremity edema     severe chronic "    Carotid artery occlusion     Cataract     CHF (congestive heart failure)     Coronary artery disease     subtotalled LAD with collateral    Depression     Fever blister     Hypothyroid     Iron deficiency anemia     Lumbar radiculopathy     with chronic pain    Ocular migraine     Renal disorder     Sleep apnea     cpap       Past Surgical History:   Procedure Laterality Date    ADENOIDECTOMY      BACK SURGERY      x 12    CARDIAC CATHETERIZATION  2016    subtotalled LAD with right to left collaterals    CATARACT EXTRACTION W/  INTRAOCULAR LENS IMPLANT Left     Dr Coleman     CYSTOSCOPIC LITHOLAPAXY N/A 6/27/2019    Procedure: CYSTOLITHOLAPAXY;  Surgeon: Shireen Mayo MD;  Location: [facility] OR 2ND FLR;  Service: Urology;  Laterality: N/A;    CYSTOSCOPIC LITHOLAPAXY N/A 9/3/2019    Procedure: CYSTOLITHOLAPAXY;  Surgeon: Shireen Mayo MD;  Location: [facility] OR 2ND FLR;  Service: Urology;  Laterality: N/A;    CYSTOSCOPY N/A 7/13/2021    Procedure: CYSTOSCOPY;  Surgeon: Shireen Mayo MD;  Location: [facility] OR 1ST FLR;  Service: Urology;  Laterality: N/A;    CYSTOSCOPY  11/16/2021    Procedure: CYSTOSCOPY;  Surgeon: Shireen Mayo MD;  Location: [facility] OR 1ST FLR;  Service: Urology;;    CYSTOSCOPY  7/19/2022    Procedure: CYSTOSCOPY;  Surgeon: Shireen Mayo MD;  Location: [facility] OR 1ST FLR;  Service: Urology;;    CYSTOSCOPY WITH INJECTION OF PERIURETHRAL BULKING AGENT  7/19/2022    Procedure: CYSTOSCOPY, WITH PERIURETHRAL BULKING AGENT INJECTION-MACROPLASTIQUE;  Surgeon: Shireen Mayo MD;  Location: [facility] OR 1ST FLR;  Service: Urology;;    ESOPHAGOGASTRODUODENOSCOPY N/A 5/23/2018    Procedure: ESOPHAGOGASTRODUODENOSCOPY (EGD);  Surgeon: Prince Vance MD;  Location: [facility] (4TH FLR);  Service: Endoscopy;  Laterality: N/A;  r/s 'd per Dr. Vance due to family emergency- ER    HYSTERECTOMY  1975    endometriosis    INJECTION OF BOTULINUM TOXIN TYPE A  7/13/2021    Procedure:  INJECTION, BOTULINUM TOXIN, 200units;  Surgeon: Shireen Mayo MD;  Location: [facility] OR [address] [place] [address];  Service: Urology;;    INJECTION OF BOTULINUM TOXIN TYPE A  11/16/2021    Procedure: INJECTION, BOTULINUM TOXIN, 200units;  Surgeon: Shireen Mayo MD;  Location: [facility] OR [address] [place] [address];  Service: Urology;;    INJECTION OF BOTULINUM TOXIN TYPE A  7/19/2022    Procedure: INJECTION, BOTULINUM TOXIN, 300 units ;  Surgeon: Shireen Mayo MD;  Location: [facility] OR [address] [place] [address];  Service: Urology;;    pain pump placement      SQ Dilaudid Pump managed by Dr. Hillman, Pain Management    REPLACEMENT OF CATHETER N/A 10/31/2019    Procedure: REPLACEMENT, CATHETER-SUPRAPUBIC;  Surgeon: Shireen Mayo MD;  Location: [facility] OR [address] [place] [address];  Service: Urology;  Laterality: N/A;    SPINAL CORD STIMULATOR REMOVAL      before [place]    SPINE SURGERY  5-13-13    CERVICAL FUSION    TONSILLECTOMY         Review of patient's allergies indicates:   Allergen Reactions    (d)-limonene flavor      Other reaction(s): difficult intubation  Other reaction(s): Difficulty breathing    Bactrim [sulfamethoxazole-trimethoprim] Anaphylaxis    Benadryl [diphenhydramine hcl] Shortness Of Breath    Fentanyl Itching, Nausea And Vomiting and Swelling             Imitrex [sumatriptan succinate] Shortness Of Breath    Topamax [topiramate] Shortness Of Breath    Vancomycin Shortness Of Breath     Rash    Butorphanol tartrate     Darvocet a500 [propoxyphene n-acetaminophen]      Other reaction(s): Difficulty breathing    White petrolatum-zinc     Zinc oxide-white petrolatum      Other reaction(s): Difficulty breathing    Latex, natural rubber Itching and Rash    Phenytoin Rash and Other (See Comments)     Trouble breathing       No current facility-administered medications on file prior to encounter.     Current Outpatient Medications on File Prior to Encounter   Medication Sig    acyclovir 5% (ZOVIRAX) 5 % ointment Apply topically 5 (five) times daily.     aspirin (ECOTRIN) 81 MG EC tablet Take 1 tablet (81 mg total) by mouth once daily.    atorvastatin (LIPITOR) 80 MG tablet TAKE ONE TABLET BY MOUTH EVERY DAY (Patient taking differently: Take by mouth every evening.)    butalbital-acetaminophen-caffeine -40 mg (FIORICET, ESGIC) -40 mg per tablet Take 1 tablet by mouth daily as needed.    FLUoxetine 20 MG capsule Take 3 capsules (60 mg total) by mouth once daily.    furosemide (LASIX) 20 MG tablet Take 1 tablet (20 mg total) by mouth once daily.    HYDROcodone-acetaminophen (NORCO)  mg per tablet Take by mouth every 24 hours as needed.    intrathecal pain pump compound Hydromorphone (7.5 mg/mL) infusion at 3.6799 mg/day (0.1533 mg/hr) out of a total reservoir volume of 37.3 mL  Pump filled every 2 months    levothyroxine (SYNTHROID) 137 MCG Tab tablet Take 1 tablet (137 mcg total) by mouth before breakfast.    lidocaine (LIDODERM) 5 % daily as needed.     nitroGLYCERIN (NITROSTAT) 0.4 MG SL tablet Place 1 tablet (0.4 mg total) under the tongue every 5 (five) minutes as needed for Chest pain.    ondansetron (ZOFRAN-ODT) 4 MG TbDL Take by mouth.    pantoprazole (PROTONIX) 40 MG tablet TAKE ONE TABLET BY MOUTH EVERY DAY (Patient taking differently: Take by mouth every morning.)    potassium chloride SA (K-DUR,KLOR-CON) 20 MEQ tablet Take 1 tablet (20 mEq total) by mouth 2 (two) times daily.    promethazine (PHENERGAN) 25 MG tablet Take 25 mg by mouth every 6 (six) hours as needed for Nausea.    QUEtiapine (SEROQUEL) 100 MG Tab TAKE 2 TABLETS (200 MG) BY MOUTH NIGHTLY    senna (SENNA LAX) 8.6 mg tablet Take 2 tablets by mouth 2 (two) times daily.    tiZANidine (ZANAFLEX) 4 MG tablet Take 4 mg by mouth 2 (two) times daily.    traZODone (DESYREL) 100 MG tablet Take 3 tablets (300 mg total) by mouth every evening.    vibegron (GEMTESA) 75 mg Tab Take 1 tablet by mouth Daily.    [DISCONTINUED] torsemide (DEMADEX) 100 MG Tab TAKE ONE  TABLET BY MOUTH EVERY DAY     Family History       Problem Relation (Age of Onset)    Cancer Mother (55), Father    Cirrhosis Paternal Aunt, Paternal Uncle    Colon cancer Maternal Uncle    Esophageal cancer Father    Heart disease Maternal Uncle    Liver disease Paternal Aunt, Paternal Uncle    No Known Problems Brother, Brother, Sister, Maternal Aunt, Maternal Grandfather, Paternal Grandmother, Paternal Grandfather    Parkinsonism Maternal Grandmother    Tremor Maternal Grandmother          Tobacco Use    Smoking status: Never Smoker    Smokeless tobacco: Never Used   Substance and Sexual Activity    Alcohol use: Never    Drug use: No    Sexual activity: Never     Partners: Male     Review of Systems   Constitutional:  Positive for activity change, fatigue and unexpected weight change.   HENT: Negative.     Eyes: Negative.    Respiratory:  Positive for shortness of breath. Negative for wheezing.    Cardiovascular:  Positive for leg swelling.   Gastrointestinal: Negative.    Genitourinary: Negative.    Musculoskeletal:  Positive for back pain.   Skin:  Positive for color change.   Neurological: Negative.    Psychiatric/Behavioral: Negative.     Objective:     Vital Signs (Most Recent):  Temp: 98.3 °F (36.8 °C) (07/27/22 1805)  Pulse: 71 (07/28/22 0004)  Resp: 20 (07/28/22 0004)  BP: (!) 102/55 (07/28/22 0004)  SpO2: 95 % (07/28/22 0004)   Vital Signs (24h Range):  Temp:  [98.3 °F (36.8 °C)] 98.3 °F (36.8 °C)  Pulse:  [60-71] 71  Resp:  [18-20] 20  SpO2:  [92 %-96 %] 95 %  BP: ()/(53-80) 102/55     Weight: 91.6 kg (202 lb)  Body mass index is 34.67 kg/m².    Physical Exam  Vitals and nursing note reviewed.   Constitutional:       General: She is sleeping.      Appearance: She is ill-appearing.   HENT:      Head: Normocephalic and atraumatic.      Mouth/Throat:      Mouth: Mucous membranes are dry.   Eyes:      Extraocular Movements: Extraocular movements intact.      Pupils: Pupils are equal, round, and  reactive to light.   Cardiovascular:      Rate and Rhythm: Normal rate and regular rhythm.      Pulses: Normal pulses.      Heart sounds: Normal heart sounds.   Pulmonary:      Effort: No respiratory distress.      Breath sounds: Normal breath sounds. No wheezing.   Abdominal:      General: Bowel sounds are normal. There is no distension.      Palpations: Abdomen is soft.      Tenderness: There is no abdominal tenderness.   Genitourinary:     Comments: Suprapubic catheter in place  Musculoskeletal:         General: Tenderness present. Normal range of motion.      Cervical back: Normal range of motion.      Right lower leg: Edema present.      Left lower leg: Edema present.      Comments: BLE edema up to thighs with legs wrapped   Skin:     General: Skin is warm and dry.      Capillary Refill: Capillary refill takes 2 to 3 seconds.   Neurological:      General: No focal deficit present.      Mental Status: She is oriented to person, place, and time and easily aroused. Mental status is at baseline.   Psychiatric:         Mood and Affect: Mood normal.         Behavior: Behavior normal.         Thought Content: Thought content normal.         Judgment: Judgment normal.         CRANIAL NERVES     CN III, IV, VI   Pupils are equal, round, and reactive to light.     Significant Labs: All pertinent labs within the past 24 hours have been reviewed.    Significant Imaging: I have reviewed all pertinent imaging results/findings within the past 24 hours.    Assessment/Plan:     * Acute on chronic diastolic heart failure  Patient presents with shortness of breath and JONES and peripheral edema on exam  Last ECHO results:   Results for orders placed during the hospital encounter of 04/14/22    Echo    Interpretation Summary  · The left ventricle is normal in size with normal systolic function.  · The estimated ejection fraction is 55%.  · Mild mitral regurgitation.  · Normal left ventricular diastolic function.  · Mild tricuspid  regurgitation.  · Normal right ventricular size with normal right ventricular systolic function.  · There is no pulmonary hypertension.    -Consistent clinical presentation; BNP 17  -Troponin 0.006  -CHF pathway initiated  -CXR revealed cardiomegaly and findings suggestive of interstitial edema/CHF, similar to prior examination.  -Initiated lasix 80 mg in ED, will continue Lasix IV at 40 mg daily  -Daily weights  -Strict I/Os  -Monitor on telemetry  -Monitor and trend BMP, Mg, and renal function; keep K >4, Mg >2  -Sodium restriction (<2g/d), fluid restriction (<2L)   -2D echo to assess cardiac function and valvular dysfunction.   -Monitor for signs of fluid overload: RR>30, O2 sat<92%, weight gain of >3 lbs in 24 hours, or urinary output <160ml/8hr    Class 1 obesity due to excess calories in adult  Body mass index is 34.67 kg/m². Morbid obesity complicates all aspects of disease management from diagnostic modalities to treatment. Weight loss encouraged and health benefits explained to patient.     Insomnia due to medical condition  -Resume home seroquel    Lymphedema of both lower extremities  -Worsening BLE edema up to thighs with legs wrapped  -Takes lasix 20 mg BID at home without much relief  -See CHF      Primary hypothyroidism  -resume home synthroid    Frequent falls  -fall precautions    Neurogenic bladder  Urinary retention  -Suprapubic catheter in place  -Changes every month   - Followed by urology (Milena)    Coronary artery disease of native artery of native heart with stable angina pectoris  -Patient with known CAD which is controlled   -Will continue ASA and Statin and monitor for S/Sx of angina/ACS.   -Continue to monitor on telemetry.   -Followed by [facility]    GERD (gastroesophageal reflux disease)  -Resume home protonix    Chronic pain syndrome  Narcotic dependency, continuous  S/P insertion of intrathecal pump  Cervical myelopathy  -Patient with dilaudid intrathecal pump  -PRN  constipation medication    Major depressive disorder, recurrent, mild  Generalized anxiety disorder  Patient has recurrent depression which is mild and is currently controlled. Will Continue anti-depressant medications. Continue home anxiety medications. We will not consult psychiatry at this time. Patient does not display psychosis at this time. Continue to monitor closely and adjust plan of care as needed.    Iron deficiency anemia  -Hgb stable at 9.1  -no replacement supplements taken at home  -transfuse for hgb < 7  -monitor    Sleep apnea  -not on CPAP per PCP note  -patient informed nurse that she sometimes uses CPAP at home  -will order CPAP QHS    VTE Risk Mitigation (From admission, onward)         Ordered     enoxaparin injection 40 mg  Daily         07/28/22 0013     IP VTE HIGH RISK PATIENT  Once         07/28/22 0013     Place sequential compression device  Until discontinued         07/28/22 0013                   Susi Garzon DNP, AGACNP-BC  Hospitalist   Department of Hospital Medicine   [facility]   Office #: [phone]

## 2022-07-28 NOTE — ASSESSMENT & PLAN NOTE
Narcotic dependency, continuous  S/P insertion of intrathecal pump  Cervical myelopathy  -Patient with dilaudid intrathecal pump  -PRN constipation medication

## 2022-07-28 NOTE — ASSESSMENT & PLAN NOTE
-not on CPAP per PCP note  -patient informed nurse that she sometimes uses CPAP at home  -CPAP QHS ordered

## 2022-07-29 LAB
ANION GAP SERPL CALC-SCNC: 11 MMOL/L (ref 8–16)
BASOPHILS # BLD AUTO: 0.02 K/UL (ref 0–0.2)
BASOPHILS NFR BLD: 0.4 % (ref 0–1.9)
BUN SERPL-MCNC: 10 MG/DL (ref 8–23)
CALCIUM SERPL-MCNC: 8.8 MG/DL (ref 8.7–10.5)
CHLORIDE SERPL-SCNC: 96 MMOL/L (ref 95–110)
CO2 SERPL-SCNC: 33 MMOL/L (ref 23–29)
CREAT SERPL-MCNC: 1 MG/DL (ref 0.5–1.4)
DIFFERENTIAL METHOD: ABNORMAL
EOSINOPHIL # BLD AUTO: 0.3 K/UL (ref 0–0.5)
EOSINOPHIL NFR BLD: 5.5 % (ref 0–8)
ERYTHROCYTE [DISTWIDTH] IN BLOOD BY AUTOMATED COUNT: 13.6 % (ref 11.5–14.5)
EST. GFR  (AFRICAN AMERICAN): >60 ML/MIN/1.73 M^2
EST. GFR  (NON AFRICAN AMERICAN): 59 ML/MIN/1.73 M^2
GLUCOSE SERPL-MCNC: 92 MG/DL (ref 70–110)
HCT VFR BLD AUTO: 31.4 % (ref 37–48.5)
HGB BLD-MCNC: 9.5 G/DL (ref 12–16)
IMM GRANULOCYTES # BLD AUTO: 0 K/UL (ref 0–0.04)
IMM GRANULOCYTES NFR BLD AUTO: 0 % (ref 0–0.5)
LYMPHOCYTES # BLD AUTO: 1.3 K/UL (ref 1–4.8)
LYMPHOCYTES NFR BLD: 26.7 % (ref 18–48)
MAGNESIUM SERPL-MCNC: 2.1 MG/DL (ref 1.6–2.6)
MCH RBC QN AUTO: 27.9 PG (ref 27–31)
MCHC RBC AUTO-ENTMCNC: 30.3 G/DL (ref 32–36)
MCV RBC AUTO: 92 FL (ref 82–98)
MONOCYTES # BLD AUTO: 0.5 K/UL (ref 0.3–1)
MONOCYTES NFR BLD: 9.9 % (ref 4–15)
NEUTROPHILS # BLD AUTO: 2.7 K/UL (ref 1.8–7.7)
NEUTROPHILS NFR BLD: 57.5 % (ref 38–73)
NRBC BLD-RTO: 0 /100 WBC
PLATELET # BLD AUTO: 197 K/UL (ref 150–450)
PMV BLD AUTO: 10.3 FL (ref 9.2–12.9)
POCT GLUCOSE: 88 MG/DL (ref 70–110)
POTASSIUM SERPL-SCNC: 3.8 MMOL/L (ref 3.5–5.1)
RBC # BLD AUTO: 3.41 M/UL (ref 4–5.4)
SODIUM SERPL-SCNC: 140 MMOL/L (ref 136–145)
WBC # BLD AUTO: 4.76 K/UL (ref 3.9–12.7)

## 2022-07-29 PROCEDURE — 97530 THERAPEUTIC ACTIVITIES: CPT

## 2022-07-29 PROCEDURE — 80048 BASIC METABOLIC PNL TOTAL CA: CPT | Performed by: NURSE PRACTITIONER

## 2022-07-29 PROCEDURE — 97161 PT EVAL LOW COMPLEX 20 MIN: CPT

## 2022-07-29 PROCEDURE — 83735 ASSAY OF MAGNESIUM: CPT | Performed by: NURSE PRACTITIONER

## 2022-07-29 PROCEDURE — 85025 COMPLETE CBC W/AUTO DIFF WBC: CPT | Performed by: NURSE PRACTITIONER

## 2022-07-29 PROCEDURE — 96376 TX/PRO/DX INJ SAME DRUG ADON: CPT

## 2022-07-29 PROCEDURE — 97166 OT EVAL MOD COMPLEX 45 MIN: CPT

## 2022-07-29 PROCEDURE — 63600175 PHARM REV CODE 636 W HCPCS: Performed by: NURSE PRACTITIONER

## 2022-07-29 PROCEDURE — 94660 CPAP INITIATION&MGMT: CPT

## 2022-07-29 PROCEDURE — 36415 COLL VENOUS BLD VENIPUNCTURE: CPT | Performed by: NURSE PRACTITIONER

## 2022-07-29 PROCEDURE — G0378 HOSPITAL OBSERVATION PER HR: HCPCS

## 2022-07-29 PROCEDURE — 99900035 HC TECH TIME PER 15 MIN (STAT)

## 2022-07-29 PROCEDURE — 27000221 HC OXYGEN, UP TO 24 HOURS

## 2022-07-29 PROCEDURE — 96372 THER/PROPH/DIAG INJ SC/IM: CPT | Performed by: NURSE PRACTITIONER

## 2022-07-29 PROCEDURE — 27000190 HC CPAP FULL FACE MASK W/VALVE

## 2022-07-29 PROCEDURE — 94761 N-INVAS EAR/PLS OXIMETRY MLT: CPT

## 2022-07-29 PROCEDURE — 25000003 PHARM REV CODE 250: Performed by: NURSE PRACTITIONER

## 2022-07-29 RX ORDER — LIDOCAINE 50 MG/G
1 PATCH TOPICAL DAILY
Status: DISCONTINUED | OUTPATIENT
Start: 2022-07-29 | End: 2022-07-31 | Stop reason: HOSPADM

## 2022-07-29 RX ORDER — TIZANIDINE 2 MG/1
4 TABLET ORAL 2 TIMES DAILY
Status: DISCONTINUED | OUTPATIENT
Start: 2022-07-29 | End: 2022-07-31 | Stop reason: HOSPADM

## 2022-07-29 RX ORDER — FLUOXETINE HYDROCHLORIDE 20 MG/1
60 CAPSULE ORAL DAILY
Status: DISCONTINUED | OUTPATIENT
Start: 2022-07-29 | End: 2022-07-31 | Stop reason: HOSPADM

## 2022-07-29 RX ORDER — TIZANIDINE 2 MG/1
4 TABLET ORAL 2 TIMES DAILY
Status: DISCONTINUED | OUTPATIENT
Start: 2022-07-29 | End: 2022-07-29

## 2022-07-29 RX ORDER — POLYETHYLENE GLYCOL 3350 17 G/17G
17 POWDER, FOR SOLUTION ORAL 2 TIMES DAILY PRN
Status: DISCONTINUED | OUTPATIENT
Start: 2022-07-29 | End: 2022-07-31 | Stop reason: HOSPADM

## 2022-07-29 RX ORDER — BISACODYL 10 MG
10 SUPPOSITORY, RECTAL RECTAL DAILY PRN
Status: DISCONTINUED | OUTPATIENT
Start: 2022-07-29 | End: 2022-07-31 | Stop reason: HOSPADM

## 2022-07-29 RX ORDER — TRAZODONE HYDROCHLORIDE 100 MG/1
300 TABLET ORAL NIGHTLY
Status: DISCONTINUED | OUTPATIENT
Start: 2022-07-29 | End: 2022-07-31 | Stop reason: HOSPADM

## 2022-07-29 RX ORDER — POTASSIUM CHLORIDE 750 MG/1
30 TABLET, EXTENDED RELEASE ORAL ONCE
Status: COMPLETED | OUTPATIENT
Start: 2022-07-29 | End: 2022-07-29

## 2022-07-29 RX ORDER — AMOXICILLIN 250 MG
1 CAPSULE ORAL 2 TIMES DAILY
Status: DISCONTINUED | OUTPATIENT
Start: 2022-07-29 | End: 2022-07-31 | Stop reason: HOSPADM

## 2022-07-29 RX ORDER — MICONAZOLE NITRATE 2 %
POWDER (GRAM) TOPICAL 2 TIMES DAILY PRN
Status: DISCONTINUED | OUTPATIENT
Start: 2022-07-29 | End: 2022-07-31 | Stop reason: HOSPADM

## 2022-07-29 RX ADMIN — ACETAMINOPHEN 650 MG: 325 TABLET ORAL at 05:07

## 2022-07-29 RX ADMIN — FUROSEMIDE 40 MG: 10 INJECTION, SOLUTION INTRAMUSCULAR; INTRAVENOUS at 05:07

## 2022-07-29 RX ADMIN — PANTOPRAZOLE SODIUM 40 MG: 40 TABLET, DELAYED RELEASE ORAL at 08:07

## 2022-07-29 RX ADMIN — HYDROMORPHONE HYDROCHLORIDE 0.5 MG: 1 INJECTION, SOLUTION INTRAMUSCULAR; INTRAVENOUS; SUBCUTANEOUS at 03:07

## 2022-07-29 RX ADMIN — FLUOXETINE 60 MG: 20 CAPSULE ORAL at 03:07

## 2022-07-29 RX ADMIN — LIDOCAINE 1 PATCH: 50 PATCH CUTANEOUS at 03:07

## 2022-07-29 RX ADMIN — ENOXAPARIN SODIUM 40 MG: 100 INJECTION SUBCUTANEOUS at 05:07

## 2022-07-29 RX ADMIN — ATORVASTATIN CALCIUM 80 MG: 40 TABLET, FILM COATED ORAL at 09:07

## 2022-07-29 RX ADMIN — DOCUSATE SODIUM AND SENNOSIDES 1 TABLET: 8.6; 5 TABLET, FILM COATED ORAL at 09:07

## 2022-07-29 RX ADMIN — ASPIRIN 81 MG: 81 TABLET, COATED ORAL at 08:07

## 2022-07-29 RX ADMIN — ACETAMINOPHEN 650 MG: 325 TABLET ORAL at 09:07

## 2022-07-29 RX ADMIN — LEVOTHYROXINE SODIUM 137 MCG: 25 TABLET ORAL at 05:07

## 2022-07-29 RX ADMIN — TIZANIDINE 4 MG: 2 TABLET ORAL at 03:07

## 2022-07-29 RX ADMIN — QUETIAPINE FUMARATE 200 MG: 100 TABLET ORAL at 09:07

## 2022-07-29 RX ADMIN — POTASSIUM CHLORIDE 30 MEQ: 750 TABLET, EXTENDED RELEASE ORAL at 08:07

## 2022-07-29 RX ADMIN — HYDROMORPHONE HYDROCHLORIDE 0.5 MG: 1 INJECTION, SOLUTION INTRAMUSCULAR; INTRAVENOUS; SUBCUTANEOUS at 08:07

## 2022-07-29 RX ADMIN — FUROSEMIDE 40 MG: 10 INJECTION, SOLUTION INTRAMUSCULAR; INTRAVENOUS at 08:07

## 2022-07-29 RX ADMIN — TRAZODONE HYDROCHLORIDE 300 MG: 100 TABLET ORAL at 09:07

## 2022-07-29 NOTE — ASSESSMENT & PLAN NOTE
Narcotic dependency, continuous  S/P insertion of intrathecal pump  S/P spinal cord stimulator   Cervical myelopathy  -chronic; patient with dilaudid intrathecal pump  -continue home medications  -continue PRN PO and IV supportive care as indicated  -bowel regimen and PRN constipation medication  -fall precautions  -PT/OT following

## 2022-07-29 NOTE — ASSESSMENT & PLAN NOTE
Body mass index is 33.38 kg/m². Morbid obesity complicates all aspects of disease management from diagnostic modalities to treatment. Weight loss encouraged and health benefits explained to patient.

## 2022-07-29 NOTE — PLAN OF CARE
"  Problem: Physical Therapy  Goal: Physical Therapy Goal  Description: Goals to be met by: 22     Patient will increase functional independence with mobility by performin. Supine to sit with Supervision  2. Sit to supine with Supervision  3. Sit to stand transfer with Supervision  4. Bed to chair transfer with Supervision using Rolling Walker  5. Gait  x 100 feet with Supervision using Rolling Walker.     Outcome: Ongoing, Progressing     Pt sat EOB and reporting dizziness and "seeing spots." Pt tremulous in supine which increased sitting EOB. Pt able to perform seated scooting laterally L toward HOB with SBA. Pt returned supine. Difficulty obtaining BP sitting EOB and upon returning supine. BP supine: 104/51 after ~3 minutes. Nsg notified. Unable to assess gait 2/2 above reasons - would recommend post acute placement at this time. Will update recs pending pt's progress.   "

## 2022-07-29 NOTE — PLAN OF CARE
Problem: Adult Inpatient Plan of Care  Goal: Plan of Care Review  Outcome: Ongoing, Progressing     Problem: Skin Injury Risk Increased  Goal: Skin Health and Integrity  Outcome: Ongoing, Progressing     Problem: Obstructive Sleep Apnea Risk or Actual Comorbidity Management  Goal: Unobstructed Breathing During Sleep  Outcome: Ongoing, Progressing

## 2022-07-29 NOTE — SUBJECTIVE & OBJECTIVE
Interval History: Resting in bed, reports chronic back pain and increased anxiety today. She reports some decrease in LE swelling overnight. Wound care redressing leg wraps, no concern for cellulitis and no draining wounds. She denies chest pain, palpitations, or shortness of breath. Denies any acute events or distress overnight.     Review of Systems   Constitutional:  Positive for activity change. Negative for appetite change, chills, diaphoresis, fatigue and fever.   HENT:  Negative for congestion and sore throat.    Respiratory:  Negative for cough, chest tightness, shortness of breath and wheezing.    Cardiovascular:  Positive for leg swelling. Negative for chest pain and palpitations.   Gastrointestinal:  Negative for abdominal distention, abdominal pain, constipation, diarrhea, nausea and vomiting.   Genitourinary:  Negative for decreased urine volume, difficulty urinating and urgency.   Musculoskeletal:  Positive for arthralgias (chronic), back pain (chronic) and gait problem.   Skin:  Negative for wound.   Neurological:  Negative for dizziness, syncope, weakness, light-headedness and headaches.   Psychiatric/Behavioral:  The patient is nervous/anxious.    Objective:     Vital Signs (Most Recent):  Temp: 96.7 °F (35.9 °C) (07/29/22 1219)  Pulse: (!) 56 (07/29/22 1220)  Resp: 14 (07/29/22 1219)  BP: (!) 96/49 (07/29/22 1220)  SpO2: 97 % (07/29/22 1219)   Vital Signs (24h Range):  Temp:  [96.2 °F (35.7 °C)-98.1 °F (36.7 °C)] 96.7 °F (35.9 °C)  Pulse:  [50-64] 56  Resp:  [14-20] 14  SpO2:  [90 %-97 %] 97 %  BP: ()/(46-79) 96/49     Weight: 88.2 kg (194 lb 7.1 oz)  Body mass index is 33.38 kg/m².    Intake/Output Summary (Last 24 hours) at 7/29/2022 1410  Last data filed at 7/29/2022 0834  Gross per 24 hour   Intake 361 ml   Output 1250 ml   Net -889 ml      Physical Exam  Vitals and nursing note reviewed.   Constitutional:       General: She is not in acute distress.     Appearance: Normal appearance. She  is well-developed.   HENT:      Head: Normocephalic and atraumatic.      Mouth/Throat:      Dentition: Normal dentition.   Eyes:      General: Lids are normal.      Extraocular Movements: Extraocular movements intact.      Conjunctiva/sclera: Conjunctivae normal.   Cardiovascular:      Rate and Rhythm: Normal rate and regular rhythm.      Heart sounds: Normal heart sounds.   Pulmonary:      Effort: Pulmonary effort is normal.      Breath sounds: Normal breath sounds.   Chest:      Chest wall: No tenderness.   Abdominal:      General: Bowel sounds are normal. There is no distension.      Palpations: Abdomen is soft.      Tenderness: There is no abdominal tenderness.   Musculoskeletal:         General: Normal range of motion.      Cervical back: Neck supple.      Right lower leg: Edema present.      Left lower leg: Edema present.   Skin:     General: Skin is warm and dry.      Findings: No erythema or rash.   Neurological:      Mental Status: She is alert and oriented to person, place, and time.     Significant Labs: All pertinent labs within the past 24 hours have been reviewed.  CBC:   Recent Labs   Lab 07/27/22  2215 07/28/22  0326 07/29/22  0333   WBC 5.24 5.39 4.76   HGB 9.1* 9.8* 9.5*   HCT 28.6* 32.1* 31.4*    199 197     CMP:   Recent Labs   Lab 07/27/22  2242 07/28/22  0326 07/29/22  0332    140 140   K 4.0 3.8 3.8    99 96   CO2 30* 31* 33*   GLU 81 82 92   BUN 7* 7* 10   CREATININE 0.8 0.9 1.0   CALCIUM 9.0 9.1 8.8   PROT 6.8  --   --    ALBUMIN 3.3*  --   --    BILITOT 0.3  --   --    ALKPHOS 96  --   --    AST 9*  --   --    ALT <5*  --   --    ANIONGAP 10 10 11   EGFRNONAA >60 >60 59*     Significant Imaging: I have reviewed all pertinent imaging results/findings within the past 24 hours.

## 2022-07-29 NOTE — CONSULTS
[facility] - Telemetry  Wound Care    Patient Name:  Tasha Hawley   MRN:  [id number]  Date: 7/29/2022  Diagnosis: Acute on chronic diastolic heart failure    History:     Past Medical History:   Diagnosis Date    Anticoagulant long-term use     Anxiety     Arthritis     Bilateral lower extremity edema     severe chronic    Carotid artery occlusion     Cataract     CHF (congestive heart failure)     Coronary artery disease     subtotalled LAD with collateral    Depression     Fever blister     Hypothyroid     Iron deficiency anemia     Lumbar radiculopathy     with chronic pain    Ocular migraine     Renal disorder     Sleep apnea     cpap       Social History     Socioeconomic History    Marital status:    Tobacco Use    Smoking status: Never Smoker    Smokeless tobacco: Never Used   Substance and Sexual Activity    Alcohol use: Never    Drug use: No    Sexual activity: Never     Partners: Male     Social Determinants of Health     Financial Resource Strain: Low Risk     Difficulty of Paying Living Expenses: Not very hard   Food Insecurity: No Food Insecurity    Worried About Running Out of Food in the Last Year: Never true    Ran Out of Food in the Last Year: Never true   Transportation Needs: No Transportation Needs    Lack of Transportation (Medical): No    Lack of Transportation (Non-Medical): No   Physical Activity: Inactive    Days of Exercise per Week: 0 days    Minutes of Exercise per Session: 0 min   Housing Stability: Low Risk     Unable to Pay for Housing in the Last Year: No    Number of Places Lived in the Last Year: 1    Unstable Housing in the Last Year: No       Precautions:     Allergies as of 07/27/2022 - Reviewed 07/27/2022   Allergen Reaction Noted    (d)-limonene flavor  09/05/2012    Bactrim [sulfamethoxazole-trimethoprim] Anaphylaxis 09/05/2012    Benadryl [diphenhydramine hcl] Shortness Of Breath 06/08/2018    Fentanyl Itching, Nausea And Vomiting, and  Swelling 09/05/2012    Imitrex [sumatriptan succinate] Shortness Of Breath 01/03/2013    Topamax [topiramate] Shortness Of Breath 01/03/2013    Vancomycin Shortness Of Breath 09/05/2012    Butorphanol tartrate  10/25/2016    Darvocet a500 [propoxyphene n-acetaminophen]  09/05/2012    White petrolatum-zinc  01/23/2017    Zinc oxide-white petrolatum  09/05/2012    Latex, natural rubber Itching and Rash 02/19/2013    Phenytoin Rash and Other (See Comments) 10/02/2018       WOC Assessment Details/Treatment     Compression wraps changed to BLE noting no open wounds.  Less edema and erythema.  Heels intact with no redness.    BLE- no open wounds        R plantar foot- aperture in place        L plantar foot- aperture in place           07/29/2022

## 2022-07-29 NOTE — PLAN OF CARE
"OT jo ann performed this date. Pt slightly confused during session. Per pt she fell prior to admission & hit head on a rock & rollator landing on top of her on R shoulder. Pt now reports increased R shoulder pain. Limited jo ann performed this date 2/2 pt reporting dizziness & "seeing spots" when seated EOB; unable to obtain BP EOB so pt returned to supine. /51 after ~3 min in sup. Nsg notified. /57 after ~6 min EOB. Secure chat sent to medical team regarding pt's reports of fall & OH. At this time will recommend post acute placement upon d/c. Will progress as able.     Problem: Occupational Therapy  Goal: Occupational Therapy Goal  Description: Goals to be met by: 8/29/2022     Patient will increase functional independence with ADLs by performing:    Toileting from toilet with Stand-by Assistance for hygiene and clothing management.   Supine to sit with Stand-by Assistance.   Step transfer with Contact Guard Assistance & appropriate AD.   Toilet transfer to toilet with Contact Guard Assistance & appropriate AD.     Outcome: Ongoing, Progressing     "

## 2022-07-29 NOTE — ASSESSMENT & PLAN NOTE
-reports mechanical falls that she attributes to chronic LE swelling and weakness however suspect polypharmacy and narcotics play a role  -uses walker at baseline  -PT/OT following  -resume HH with PT/OT therapies at [place]  -fall precautions

## 2022-07-29 NOTE — PLAN OF CARE
SW sent  referral to [facility] Phone: [phone]    N/A. SW will follow.     Torres Frankel, MSW  [phone]    Future Appointments   Date Time Provider Department Center   8/1/2022  3:40 PM Shireen Mayo MD [facility] UROLOGY [facility]   8/4/2022  1:20 PM Saima Blanc MD [facility] CARDIO [facility]   8/12/2022  9:20 AM Mesfin Hodges II, MD [facility]   8/19/2022 11:00 AM Mesfin Hodges II, MD [facility] W        07/29/22 1119   Post-Acute Status   Post-Acute Authorization Home Health   Home Health Status Referrals Sent   Coverage [facility] Resources/Appts/Education Provided Appointments scheduled and added to AVS   Discharge Delays None known at this time   Discharge Plan   Discharge Plan A Home Health

## 2022-07-29 NOTE — ASSESSMENT & PLAN NOTE
Major depressive disorder, recurrent, mild  -chronic  -continue home therapies  -PRN supportive care as clinically indicated   -monitor

## 2022-07-29 NOTE — PT/OT/SLP EVAL
"Occupational Therapy   Evaluation    Name: Tasha Hawley  MRN: [id number]  Admitting Diagnosis:  Acute on chronic diastolic heart failure  Recent Surgery: * No surgery found *      Recommendations:     Discharge Recommendations: other (see comments) (post acute placement)  Discharge Equipment Recommendations:  other (see comments) (TBD)  Barriers to discharge:  Other (Comment) (Pt requires increased level of assistance)    Assessment:     Tasha Hawley is a 66 y.o. female with a medical diagnosis of Acute on chronic diastolic heart failure.  She presents with The primary encounter diagnosis was Acute on chronic congestive heart failure, unspecified heart failure type. Diagnoses of Shortness of breath, Acute exacerbation of CHF (congestive heart failure), and Lymphedema of both lower extremities were also pertinent to this visit. Performance deficits affecting function: weakness, impaired functional mobility, impaired cognition, decreased safety awareness, pain, gait instability, impaired endurance, impaired cardiopulmonary response to activity, decreased coordination, decreased upper extremity function, decreased lower extremity function, impaired self care skills, impaired balance, impaired sensation, decreased ROM, edema, impaired skin, impaired fine motor.      OT jo ann performed this date. Pt slightly confused during session. Per pt she fell prior to admission & hit head on a rock & rollator landing on top of her on R shoulder. Pt now reports increased R shoulder pain. Limited eval performed this date 2/2 pt reporting dizziness & "seeing spots" when seated EOB; unable to obtain BP EOB so pt returned to supine. /51 after ~3 min in sup. Nsg notified. /57 after ~6 min EOB. Secure chat sent to medical team regarding pt's reports of fall & OH. At this time will recommend post acute placement upon d/c. Will progress as able.     Rehab Prognosis: Good and Fair; patient would benefit from acute skilled " "OT services to address these deficits and reach maximum level of function.       Plan:     Patient to be seen 3 x/week to address the above listed problems via self-care/home management, therapeutic activities, therapeutic exercises  · Plan of Care Expires: 08/29/22  · Plan of Care Reviewed with: patient    Subjective     Chief Complaint: Pain, fatigue from lack of sleep, and dizziness  Patient/Family Comments/goals: Pt would like to speak to MD    Occupational Profile:  Pt is a questionable historian.  Pt lives with her  in a SSH, ramp to enter, and T/S with SC and GB  Prior to admission, patients level of function was needing assist to Mod I. Pt needing assist with dressing, ambulating, and getting in/out shower. Pt uses rollator for mobility. Pt uses home O2.  Equipment used at home: wheelchair, walker, rolling, rollator, grab bar, shower chair, bedside commode, oxygen.    Upon discharge, patient will have assistance from family.    Pain/Comfort:  · Pain Rating 1: 9/10  · Location - Side 1: Bilateral  · Location 1: leg (back & feet)  · Pain Addressed 1: Reposition, Distraction, Cessation of Activity, Nurse notified  · Pain Rating Post-Intervention 1: other (see comments) (not rated)    Patients cultural, spiritual, Mandaen conflicts given the current situation: no    Objective:     Communicated with: nssamir prior to session.  Patient found HOB elevated with oxygen, telemetry (suprapubic catheter) upon OT entry to room.    General Precautions: Standard, fall   Orthopedic Precautions:N/A   Braces: N/A  Respiratory Status: Nasal cannula    Occupational Performance:    Bed Mobility:    · Patient completed Scooting/Bridging with stand by assistance to scoot laterally along EOB using BUE  · Patient completed Supine to Sit with moderate assistance and 2 persons  · Patient completed Sit to Supine with maximal assistance and 2 persons    Functional Mobility/Transfers:  · NT 2/2 dizzy & "seeing spots" seated EOB & " "unable to obtain BP    Cognitive/Visual Perceptual:  Cognitive/Psychosocial Skills:     -       Oriented to: name, , & Angiesedison Jensen, stating  for year   -       Memory: Impaired LTM  -       Safety awareness/insight to disability: impaired   -       Mood/Affect/Coping skills/emotional control: Cooperative    Physical Exam:  Upper Extremity Range of Motion:     -       Right Upper Extremity: decreased shoulder flex ~75 degrees  -       Left Upper Extremity: decreased shoulder flex ~90 degrees  Upper Extremity Strength:    -       Right Upper Extremity: 2+/5  -       Left Upper Extremity: 2+/5   Strength:    -       Right Upper Extremity: Poor  -       Left Upper Extremity: Poor    AMPAC 6 Click ADL:  AMPAC Total Score: 15    Treatment & Education:  OT joa nn performed this date. Pt slightly confused during session.   Per pt she fell prior to admission & hit head on a rock & rollator landing on top of her on R shoulder.   Pt now reports increased R shoulder pain.   Limited eval performed this date 2/ pt reporting dizziness & "seeing spots" when seated EOB; unable to obtain BP EOB so pt returned to supine.   /51 after ~3 min in sup. Nsg notified.   /57 after ~6 min EOB.   Secure chat sent to medical team regarding pt's reports of fall & OH. At this time will recommend post acute placement upon d/c.   Will progress as able.   Education:    Patient left HOB elevated with all lines intact, call button in reach, bed alarm on and nsg notified    GOALS:   Multidisciplinary Problems     Occupational Therapy Goals        Problem: Occupational Therapy    Goal Priority Disciplines Outcome Interventions   Occupational Therapy Goal     OT, PT/OT Ongoing, Progressing    Description: Goals to be met by: 2022     Patient will increase functional independence with ADLs by performing:    Toileting from toilet with Stand-by Assistance for hygiene and clothing management.   Supine to sit with Stand-by " Assistance.   Step transfer with Contact Guard Assistance & appropriate AD.   Toilet transfer to toilet with Contact Guard Assistance & appropriate AD.                      History:     Past Medical History:   Diagnosis Date    Anticoagulant long-term use     Anxiety     Arthritis     Bilateral lower extremity edema     severe chronic    Carotid artery occlusion     Cataract     CHF (congestive heart failure)     Coronary artery disease     subtotalled LAD with collateral    Depression     Fever blister     Hypothyroid     Iron deficiency anemia     Lumbar radiculopathy     with chronic pain    Ocular migraine     Renal disorder     Sleep apnea     cpap       Past Surgical History:   Procedure Laterality Date    ADENOIDECTOMY      BACK SURGERY      x 12    CARDIAC CATHETERIZATION  2016    subtotalled LAD with right to left collaterals    CATARACT EXTRACTION W/  INTRAOCULAR LENS IMPLANT Left     Dr Coleman     CYSTOSCOPIC LITHOLAPAXY N/A 6/27/2019    Procedure: CYSTOLITHOLAPAXY;  Surgeon: Shireen Mayo MD;  Location: [facility] OR [address] [place] [address];  Service: Urology;  Laterality: N/A;    CYSTOSCOPIC LITHOLAPAXY N/A 9/3/2019    Procedure: CYSTOLITHOLAPAXY;  Surgeon: Shireen Mayo MD;  Location: [facility] OR [address] [place] [address];  Service: Urology;  Laterality: N/A;    CYSTOSCOPY N/A 7/13/2021    Procedure: CYSTOSCOPY;  Surgeon: Shireen Mayo MD;  [facility];  Service: Urology;  Laterality: N/A;    CYSTOSCOPY  11/16/2021    Procedure: CYSTOSCOPY;  Surgeon: Shireen Mayo MD;  Location: [facility] OR [address] [place] [address];  Service: Urology;;    CYSTOSCOPY  7/19/2022    Procedure: CYSTOSCOPY;  Surgeon: Shireen Mayo MD;  Location: [facility] OR [address] [place] [address];  Service: Urology;;    CYSTOSCOPY WITH INJECTION OF PERIURETHRAL BULKING AGENT  7/19/2022    Procedure: CYSTOSCOPY, WITH PERIURETHRAL BULKING AGENT INJECTION-MACROPLASTIQUE;  Surgeon: Shireen Mayo MD;  Location: [facility] OR [address] [place] [address];  Service: Urology;;     ESOPHAGOGASTRODUODENOSCOPY N/A 5/23/2018    Procedure: ESOPHAGOGASTRODUODENOSCOPY (EGD);  Surgeon: Prince Vance MD;  Location: [facility] (4TH FLR);  Service: Endoscopy;  Laterality: N/A;  r/s 'd per Dr. Vance due to family emergency- ER    HYSTERECTOMY  1975    endometriosis    INJECTION OF BOTULINUM TOXIN TYPE A  7/13/2021    Procedure: INJECTION, BOTULINUM TOXIN, 200units;  Surgeon: Shireen Mayo MD;  [facility];  Service: Urology;;    INJECTION OF BOTULINUM TOXIN TYPE A  11/16/2021    Procedure: INJECTION, BOTULINUM TOXIN, 200units;  Surgeon: Shireen Mayo MD;  [facility];  Service: Urology;;    INJECTION OF BOTULINUM TOXIN TYPE A  7/19/2022    Procedure: INJECTION, BOTULINUM TOXIN, 300 units ;  Surgeon: Shireen Mayo MD;  Location: [facility] OR [address] [place] [address];  Service: Urology;;    pain pump placement      SQ Dilaudid Pump managed by Dr. Hillman, Pain Management    REPLACEMENT OF CATHETER N/A 10/31/2019    Procedure: REPLACEMENT, CATHETER-SUPRAPUBIC;  Surgeon: Shireen Mayo MD;  Location: [facility] OR [address] [place] [address];  Service: Urology;  Laterality: N/A;    SPINAL CORD STIMULATOR REMOVAL      before [place]    SPINE SURGERY  5-13-13    CERVICAL FUSION    TONSILLECTOMY         Time Tracking:     OT Date of Treatment: 07/29/22  OT Start Time: 0928  OT Stop Time: 1000  OT Total Time (min): 32 min with PT    Billable Minutes:Evaluation 8  Therapeutic Activity 8    7/29/2022

## 2022-07-29 NOTE — PROGRESS NOTES
[facility] Hospital Medicine  Progress Note    Patient Name: Tasha Hawley  MRN: [id number]  Patient Class: OP- Observation   Admission Date: 7/27/2022  Length of Stay: 0 days  Attending Physician: Braydon Martel MD  Primary Care Provider: Mesfin Hodges Ii, MD    Subjective:     Principal Problem:Acute on chronic diastolic heart failure     HPI:  Tasha Hawley is a 66-year-old female with a history of Anxiety, Arthritis, carotid artery occlusion, CHF, CAD (subtotaled LAD with collateral), depression, hypothyroid, Iron deficiency anemia, renal disorder, sleep apnea on CPAP, anticoagulant long-term use, and chronically severe bilateral lower extremity edema who presented to the ED for complaint of shortness of breath, 25 lb weight gain over the last few days, worsening bilateral lower extremity swelling, and hand swelling. She reports taking lasix 20 mg orally twice daily without improvement. She reports she went to her doctor today and was instructed to come to the ED for possible admission for heart failure exacerbation. Patient denies chills, fevers, nausea, vomiting, abdominal pain and all other symptoms currently.    In the ED: BNP 17, troponin 0.006. CXR: Cardiomegaly and findings suggestive of interstitial edema/CHF, similar to prior examination. Given 80 mg IV lasix. Patient on RA sats 92-96%. Admitted for observation to [facility] for further evaluation.      Overview/Hospital Course:  Ms. Hawley is a 66 YOF with PMHx of history CVA with dysarthria, anxiety and depression, chronic insomnia, hypothyroidism, CECI not on CPAP, chronic low back pain with indwelling narcotic epidural pump, chronic constipation likely related to narcotic use, neurogenic bladder with suprapubic catheter (followed by Urology), recurrent UTI's, CAD s/p PCI (midLAD PCI in 2016), ICM, and chronic BLE lymphedema.      She presented to [facility] ED from  due to shortness of breath, 25 lb  weight gain over the last few days, worsening bilateral lower extremity swelling, and hand swelling. She reported compliance with home medications including diuretic however does endorse noncompliance with low sodium diet and has high salt loads on routinely.    She was initiated on CHF pathway initiated at admission and diuresis initiated IVP lasix, currently net negative 4.5 liters with improvement in LE edema. Wound Care following, currently BLEs wrapped and compression stockings in place; referral to Outpt Lymphedema therapy placed. PT/OT following. RD consulted for dietary education.    Disposition plans: return home with family when medically ready. [facility]     Interval History: Resting in bed, reports chronic back pain and increased anxiety today. She reports some decrease in LE swelling overnight. Wound care redressing leg wraps, no concern for cellulitis and no draining wounds. She denies chest pain, palpitations, or shortness of breath. Denies any acute events or distress overnight.     Review of Systems   Constitutional:  Positive for activity change. Negative for appetite change, chills, diaphoresis, fatigue and fever.   HENT:  Negative for congestion and sore throat.    Respiratory:  Negative for cough, chest tightness, shortness of breath and wheezing.    Cardiovascular:  Positive for leg swelling. Negative for chest pain and palpitations.   Gastrointestinal:  Negative for abdominal distention, abdominal pain, constipation, diarrhea, nausea and vomiting.   Genitourinary:  Negative for decreased urine volume, difficulty urinating and urgency.   Musculoskeletal:  Positive for arthralgias (chronic), back pain (chronic) and gait problem.   Skin:  Negative for wound.   Neurological:  Negative for dizziness, syncope, weakness, light-headedness and headaches.   Psychiatric/Behavioral:  The patient is nervous/anxious.    Objective:     Vital Signs (Most Recent):  Temp: 96.7 °F (35.9 °C)  (07/29/22 1219)  Pulse: (!) 56 (07/29/22 1220)  Resp: 14 (07/29/22 1219)  BP: (!) 96/49 (07/29/22 1220)  SpO2: 97 % (07/29/22 1219)   Vital Signs (24h Range):  Temp:  [96.2 °F (35.7 °C)-98.1 °F (36.7 °C)] 96.7 °F (35.9 °C)  Pulse:  [50-64] 56  Resp:  [14-20] 14  SpO2:  [90 %-97 %] 97 %  BP: ()/(46-79) 96/49     Weight: 88.2 kg (194 lb 7.1 oz)  Body mass index is 33.38 kg/m².    Intake/Output Summary (Last 24 hours) at 7/29/2022 1410  Last data filed at 7/29/2022 0834  Gross per 24 hour   Intake 361 ml   Output 1250 ml   Net -889 ml      Physical Exam  Vitals and nursing note reviewed.   Constitutional:       General: She is not in acute distress.     Appearance: Normal appearance. She is well-developed.   HENT:      Head: Normocephalic and atraumatic.      Mouth/Throat:      Dentition: Normal dentition.   Eyes:      General: Lids are normal.      Extraocular Movements: Extraocular movements intact.      Conjunctiva/sclera: Conjunctivae normal.   Cardiovascular:      Rate and Rhythm: Normal rate and regular rhythm.      Heart sounds: Normal heart sounds.   Pulmonary:      Effort: Pulmonary effort is normal.      Breath sounds: Normal breath sounds.   Chest:      Chest wall: No tenderness.   Abdominal:      General: Bowel sounds are normal. There is no distension.      Palpations: Abdomen is soft.      Tenderness: There is no abdominal tenderness.   Musculoskeletal:         General: Normal range of motion.      Cervical back: Neck supple.      Right lower leg: Edema present.      Left lower leg: Edema present.   Skin:     General: Skin is warm and dry.      Findings: No erythema or rash.   Neurological:      Mental Status: She is alert and oriented to person, place, and time.     Significant Labs: All pertinent labs within the past 24 hours have been reviewed.  CBC:   Recent Labs   Lab 07/27/22  2215 07/28/22  0326 07/29/22  0333   WBC 5.24 5.39 4.76   HGB 9.1* 9.8* 9.5*   HCT 28.6* 32.1* 31.4*    199 197      CMP:   Recent Labs   Lab 07/27/22  2242 07/28/22  0326 07/29/22  0332    140 140   K 4.0 3.8 3.8    99 96   CO2 30* 31* 33*   GLU 81 82 92   BUN 7* 7* 10   CREATININE 0.8 0.9 1.0   CALCIUM 9.0 9.1 8.8   PROT 6.8  --   --    ALBUMIN 3.3*  --   --    BILITOT 0.3  --   --    ALKPHOS 96  --   --    AST 9*  --   --    ALT <5*  --   --    ANIONGAP 10 10 11   EGFRNONAA >60 >60 59*     Significant Imaging: I have reviewed all pertinent imaging results/findings within the past 24 hours.    Assessment/Plan:      * Acute on chronic diastolic heart failure  Lymphedema of both lower extremities   -acute on chronic in setting of noncompliance with low sodium diet  -CHF pathway initiated at admission  -HDS at admission, remains so  -troponin trending WNL  -TTE noted EF 60%, DD, CVP 3  -diuresis initiated IVP lasix >> continue  -hold home PO lasix   -currently net negative ~4.5 liters  -no further home GDMT for CHF although EF now recovered at 60%  -RD consulted for dietary education  -Wound Care consulted for LE edema evaluation and treatment   -BLE edema noted, wrapped and compression stockings in place  -elevate extremities as able   -PT/OT evaluations ordered   -referral to Outpt Lymphedema therapy placed  -continue tele monitoring  -cardiac diet with fluid restriction  -strict I&Os and daily weights  -labs in AM  -EKG PRN concerns   -monitor     Lymphedema of both lower extremities  -Worsening BLE edema up to thighs with legs wrapped  -Takes lasix 20 mg BID at home without much relief  -See CHF      Frequent falls  -reports mechanical falls that she attributes to chronic LE swelling and weakness however suspect polypharmacy and narcotics play a role  -uses walker at baseline  -PT/OT following  -resume HH with PT/OT therapies at [facility]  -fall precautions    Chronic pain syndrome  Narcotic dependency, continuous  S/P insertion of intrathecal pump  S/P spinal cord stimulator   Cervical myelopathy  -chronic; patient  with dilaudid intrathecal pump  -continue home medications  -continue PRN PO and IV supportive care as indicated  -bowel regimen and PRN constipation medication  -fall precautions  -PT/OT following     Neurogenic bladder  Urinary retention  Suprapubic catheter  -chronic, suprapubic cath in place, draining well  -follows with Urology (Milena) and recently had catheter exchanges  -monitored I&Os    Generalized anxiety disorder  Major depressive disorder, recurrent, mild  -chronic  -continue home therapies  -PRN supportive care as clinically indicated   -monitor     Major depressive disorder, recurrent, mild  Generalized anxiety disorder  Patient has recurrent depression which is mild and is currently controlled. Will Continue anti-depressant medications. Continue home anxiety medications. We will not consult psychiatry at this time. Patient does not display psychosis at this time. Continue to monitor closely and adjust plan of care as needed.    Coronary artery disease of native artery of native heart with stable angina pectoris  -Patient with known CAD which is controlled   -Will continue ASA and Statin and monitor for S/Sx of angina/ACS.   -Continue to monitor on telemetry.   -Followed by [facility]    Iron deficiency anemia  -Hgb stable at 9.1 at admission >>> continues to trend so   -no replacement supplements taken at home  -transfuse for hgb < 7  -monitor    Primary hypothyroidism  -chronic  -continue home synthroid    Sleep apnea  -not on CPAP per PCP note  -patient informed nurse that she sometimes uses CPAP at home  -CPAP QHS ordered     Insomnia due to medical condition  -chronic  -continue home seroquel and trazodone     GERD (gastroesophageal reflux disease)  -chronic  -continue home protonix    Class 1 obesity due to excess calories in adult  Body mass index is 33.38 kg/m². Morbid obesity complicates all aspects of disease management from diagnostic modalities to treatment. Weight loss encouraged  and health benefits explained to patient.       VTE Risk Mitigation (From admission, onward)         Ordered     enoxaparin injection 40 mg  Daily         07/28/22 0013     IP VTE HIGH RISK PATIENT  Once         07/28/22 0013     Place sequential compression device  Until discontinued         07/28/22 0013              Discharge Planning   JACKIE:      Code Status: Full Code   Is the patient medically ready for discharge?:     Reason for patient still in hospital (select all that apply): Patient trending condition and Treatment  Discharge Plan A: Home Health   Discharge Delays: None known at this time      Yamile Mcdaniel, DNP, AG-ACNP, BC  Department of Hospital Medicine  [facility]

## 2022-07-29 NOTE — PT/OT/SLP EVAL
"Physical Therapy Evaluation    Patient Name:  Tasha Hawley   MRN:  [id number]    Recommendations:     Discharge Recommendations:   (post acute placement)   Discharge Equipment Recommendations:  (TBD - Likely none)   Barriers to discharge: impaired functional mobility    Assessment:     Tasha Hawley is a 66 y.o. female admitted with a medical diagnosis of Acute on chronic diastolic heart failure.  She presents with the following impairments/functional limitations:  weakness, impaired balance, impaired self care skills, impaired functional mobility, gait instability, decreased coordination, decreased lower extremity function, impaired endurance, decreased upper extremity function, decreased ROM, impaired skin, edema, pain, decreased safety awareness, impaired cognition .Pt sat EOB and reporting dizziness and "seeing spots." Pt tremulous in supine which increased sitting EOB. Pt able to perform seated scooting laterally L toward HOB with SBA. Pt returned supine. Difficulty obtaining BP sitting EOB and upon returning supine. BP supine: 104/51 after ~3 minutes. Nsg notified. Unable to assess gait 2/2 above reasons - would recommend post acute placement at this time. Will update recs pending pt's progress.     Rehab Prognosis: Fair; patient would benefit from acute skilled PT services to address these deficits and reach maximum level of function.    Recent Surgery: * No surgery found *      Plan:     During this hospitalization, patient to be seen 3 x/week to address the identified rehab impairments via gait training, therapeutic activities, therapeutic exercises, neuromuscular re-education and progress toward the following goals:    · Plan of Care Expires:  08/29/22    Subjective     Chief Complaint: Pain, fatigue from lack of sleep, and dizziness  Patient/Family Comments/goals: Pt reports "I would like to speak to the doctor"  Pain/Comfort:  · Pain Rating 1: 9/10 (8-9/10)  · Location - Side 1: " Bilateral  · Location 1: leg (back and feet)  · Pain Addressed 1: Reposition, Distraction, Cessation of Activity, Nurse notified  · Pain Rating Post-Intervention 1:  (did not rate)    Patients cultural, spiritual, Latter day conflicts given the current situation:      Living Environment:  Pt is a questionable historian.  Pt lives with her  in a SSH, ramp to enter, and T/S with SC and GB  Prior to admission, patients level of function was needing assist to Mod I. Pt needing assist with dressing, ambulating, and getting in/out shower. Pt uses rollator for mobility. Pt uses home O2.  Equipment used at home: wheelchair, walker, rolling, rollator, grab bar, shower chair, bedside commode, oxygen.  Upon discharge, patient will have assistance from family.    Objective:     Communicated with nsg prior to session.  Patient found HOB elevated with oxygen, telemetry (suprapubic catheter)  upon PT entry to room.    General Precautions: Standard, fall   Orthopedic Precautions:N/A   Braces: N/A  Respiratory Status: Nasal cannula    Exams:  · Cognitive Exam:  Patient is oriented to Person, Place and Situation; delayed responses, increased drowsiness  · R side facial droop, hx of stroke  · Postural Exam:  Patient presented with the following abnormalities:    · -       Rounded shoulders  · -       Forward head  · -       Kyphosis  · Skin Integrity/Edema:      · -       Edema: lymphedema B LE wrapped in compression wraps  · Limited ROM/MMT 2/2 pt reporting pain and weakness; pt with increased pain and sensitivity upon PT attempting PROM; limited active movement noted in BLE    Functional Mobility:  · Bed Mobility:     · Scooting: stand by assistance for seated lateral scoots L along side of bed with SBA  · Supine to Sit: ModA x 2  · Sit to Supine: MaxA x 2; pt yelling out in pain with management of B LE during t/f    Therapeutic Activities and Exercises:   Pt educated on role of PT/POC and pt agreeable to participate in  "therapy session  Pt with increased drowsiness and command following at times.  Pt closing eyes during session - VC's to keep eyes open.  Pt sat EOB. SpO2 93%  Pt reporting dizziness and "seeing spots."   Pt tremulous in supine which increased sitting EOB.   Pt able to perform seated scooting laterally L toward HOB with SBA.   Pt returned supine 2/2 dizziness and increased tremors.   Difficulty obtaining BP sitting EOB and upon returning supine.   BP supine: 104/51 after ~3 minutes. Nsg notified.   Pt alert and answering questions appropriately at end of session.   Unable to assess gait 2/2 above reasons.  MD (Dr. Martel) notified via secure chat of pt reporting hx of fall a few days ago where she hit her head on a rock, hurt her R arm (unable to elevated RUE past 90 degrees), and rollator fell on top of her per pt reports.   Heel protectors replaced B feet.      AM-PAC 6 CLICK MOBILITY  Total Score:10     Patient left HOB elevated with all lines intact, call button in reach, bed alarm on and nsg and MD notified.    GOALS:   Multidisciplinary Problems     Physical Therapy Goals        Problem: Physical Therapy    Goal Priority Disciplines Outcome Goal Variances Interventions   Physical Therapy Goal     PT, PT/OT Ongoing, Progressing     Description: Goals to be met by: 22     Patient will increase functional independence with mobility by performin. Supine to sit with Supervision  2. Sit to supine with Supervision  3. Sit to stand transfer with Supervision  4. Bed to chair transfer with Supervision using Rolling Walker  5. Gait  x 100 feet with Supervision using Rolling Walker.                      History:     Past Medical History:   Diagnosis Date    Anticoagulant long-term use     Anxiety     Arthritis     Bilateral lower extremity edema     severe chronic    Carotid artery occlusion     Cataract     CHF (congestive heart failure)     Coronary artery disease     subtotalled LAD with collateral    " Depression     Fever blister     Hypothyroid     Iron deficiency anemia     Lumbar radiculopathy     with chronic pain    Ocular migraine     Renal disorder     Sleep apnea     cpap       Past Surgical History:   Procedure Laterality Date    ADENOIDECTOMY      BACK SURGERY      x 12    CARDIAC CATHETERIZATION  2016    subtotalled LAD with right to left collaterals    CATARACT EXTRACTION W/  INTRAOCULAR LENS IMPLANT Left     Dr Coleman     CYSTOSCOPIC LITHOLAPAXY N/A 6/27/2019    Procedure: CYSTOLITHOLAPAXY;  Surgeon: Shireen Mayo MD;  Location: [facility] OR 2ND FLR;  Service: Urology;  Laterality: N/A;    CYSTOSCOPIC LITHOLAPAXY N/A 9/3/2019    Procedure: CYSTOLITHOLAPAXY;  Surgeon: Shireen Mayo MD;  Location: [facility] OR 2ND FLR;  Service: Urology;  Laterality: N/A;    CYSTOSCOPY N/A 7/13/2021    Procedure: CYSTOSCOPY;  Surgeon: Shireen Mayo MD;  Location: [facility] OR 1ST FLR;  Service: Urology;  Laterality: N/A;    CYSTOSCOPY  11/16/2021    Procedure: CYSTOSCOPY;  Surgeon: Shireen Mayo MD;  Location: [facility] OR 1ST FLR;  Service: Urology;;    CYSTOSCOPY  7/19/2022    Procedure: CYSTOSCOPY;  Surgeon: Shireen Mayo MD;  Location: [facility] OR 1ST FLR;  Service: Urology;;    CYSTOSCOPY WITH INJECTION OF PERIURETHRAL BULKING AGENT  7/19/2022    Procedure: CYSTOSCOPY, WITH PERIURETHRAL BULKING AGENT INJECTION-MACROPLASTIQUE;  Surgeon: Shireen Mayo MD;  Location: [facility] OR 1ST FLR;  Service: Urology;;    ESOPHAGOGASTRODUODENOSCOPY N/A 5/23/2018    Procedure: ESOPHAGOGASTRODUODENOSCOPY (EGD);  Surgeon: Pricne Vance MD;  Location: [facility] ENDO (4TH FLR);  Service: Endoscopy;  Laterality: N/A;  r/s 'd per Dr. Vance due to family emergency- ER    HYSTERECTOMY  1975    endometriosis    INJECTION OF BOTULINUM TOXIN TYPE A  7/13/2021    Procedure: INJECTION, BOTULINUM TOXIN, 200units;  Surgeon: Shireen Mayo MD;  Location: [facility] OR 1ST FLR;  Service: Urology;;    INJECTION OF BOTULINUM TOXIN  TYPE A  11/16/2021    Procedure: INJECTION, BOTULINUM TOXIN, 200units;  Surgeon: Shireen Mayo MD;  Location: [facility] OR [address] [place] [address];  Service: Urology;;    INJECTION OF BOTULINUM TOXIN TYPE A  7/19/2022    Procedure: INJECTION, BOTULINUM TOXIN, 300 units ;  Surgeon: Shireen Mayo MD;  Location: [facility] OR [address] [place] [address];  Service: Urology;;    pain pump placement      SQ Dilaudid Pump managed by Dr. Hillman, Pain Management    REPLACEMENT OF CATHETER N/A 10/31/2019    Procedure: REPLACEMENT, CATHETER-SUPRAPUBIC;  Surgeon: Shireen Mayo MD;  Location: [facility] OR [address] [place] [address];  Service: Urology;  Laterality: N/A;    SPINAL CORD STIMULATOR REMOVAL      before [place]    SPINE SURGERY  5-13-13    CERVICAL FUSION    TONSILLECTOMY         Time Tracking:     PT Received On: 07/29/22  PT Start Time: 0928     PT Stop Time: 1000  PT Total Time (min): 32 min     Billable Minutes: Evaluation 9 and Therapeutic Activity 13 (cotx with OT)      07/29/2022

## 2022-07-29 NOTE — ASSESSMENT & PLAN NOTE
Lymphedema of both lower extremities   -acute on chronic in setting of noncompliance with low sodium diet  -CHF pathway initiated at admission  -HDS at admission, remains so  -troponin trending WNL  -TTE noted EF 60%, DD, CVP 3  -diuresis initiated IVP lasix >> continue  -hold home PO lasix   -currently net negative ~4.5 liters  -no further home GDMT for CHF although EF now recovered at 60%  -RD consulted for dietary education  -Wound Care consulted for LE edema evaluation and treatment   -BLE edema noted, wrapped and compression stockings in place  -elevate extremities as able   -PT/OT evaluations ordered   -referral to Outpt Lymphedema therapy placed  -continue tele monitoring  -cardiac diet with fluid restriction  -strict I&Os and daily weights  -labs in AM  -EKG PRN concerns   -monitor

## 2022-07-29 NOTE — ASSESSMENT & PLAN NOTE
-Hgb stable at 9.1 at admission >>> continues to trend so   -no replacement supplements taken at home  -transfuse for hgb < 7  -monitor

## 2022-07-29 NOTE — ASSESSMENT & PLAN NOTE
Urinary retention  Suprapubic catheter  -chronic, suprapubic cath in place, draining well  -follows with Urology (Milena) and recently had catheter exchanges  -monitored I&Os

## 2022-07-30 LAB
ANION GAP SERPL CALC-SCNC: 10 MMOL/L (ref 8–16)
BASOPHILS # BLD AUTO: 0.02 K/UL (ref 0–0.2)
BASOPHILS NFR BLD: 0.5 % (ref 0–1.9)
BUN SERPL-MCNC: 10 MG/DL (ref 8–23)
CALCIUM SERPL-MCNC: 9.4 MG/DL (ref 8.7–10.5)
CHLORIDE SERPL-SCNC: 95 MMOL/L (ref 95–110)
CO2 SERPL-SCNC: 34 MMOL/L (ref 23–29)
CREAT SERPL-MCNC: 0.9 MG/DL (ref 0.5–1.4)
DIFFERENTIAL METHOD: ABNORMAL
EOSINOPHIL # BLD AUTO: 0.3 K/UL (ref 0–0.5)
EOSINOPHIL NFR BLD: 8.6 % (ref 0–8)
ERYTHROCYTE [DISTWIDTH] IN BLOOD BY AUTOMATED COUNT: 13.6 % (ref 11.5–14.5)
EST. GFR  (AFRICAN AMERICAN): >60 ML/MIN/1.73 M^2
EST. GFR  (NON AFRICAN AMERICAN): >60 ML/MIN/1.73 M^2
GLUCOSE SERPL-MCNC: 98 MG/DL (ref 70–110)
HCT VFR BLD AUTO: 34.3 % (ref 37–48.5)
HGB BLD-MCNC: 10.7 G/DL (ref 12–16)
IMM GRANULOCYTES # BLD AUTO: 0.01 K/UL (ref 0–0.04)
IMM GRANULOCYTES NFR BLD AUTO: 0.3 % (ref 0–0.5)
LYMPHOCYTES # BLD AUTO: 1.3 K/UL (ref 1–4.8)
LYMPHOCYTES NFR BLD: 35.6 % (ref 18–48)
MAGNESIUM SERPL-MCNC: 2.2 MG/DL (ref 1.6–2.6)
MCH RBC QN AUTO: 28 PG (ref 27–31)
MCHC RBC AUTO-ENTMCNC: 31.2 G/DL (ref 32–36)
MCV RBC AUTO: 90 FL (ref 82–98)
MONOCYTES # BLD AUTO: 0.3 K/UL (ref 0.3–1)
MONOCYTES NFR BLD: 9.1 % (ref 4–15)
NEUTROPHILS # BLD AUTO: 1.7 K/UL (ref 1.8–7.7)
NEUTROPHILS NFR BLD: 45.9 % (ref 38–73)
NRBC BLD-RTO: 0 /100 WBC
PLATELET # BLD AUTO: 213 K/UL (ref 150–450)
PMV BLD AUTO: 9.9 FL (ref 9.2–12.9)
POTASSIUM SERPL-SCNC: 4.1 MMOL/L (ref 3.5–5.1)
RBC # BLD AUTO: 3.82 M/UL (ref 4–5.4)
SODIUM SERPL-SCNC: 139 MMOL/L (ref 136–145)
WBC # BLD AUTO: 3.74 K/UL (ref 3.9–12.7)

## 2022-07-30 PROCEDURE — 97110 THERAPEUTIC EXERCISES: CPT

## 2022-07-30 PROCEDURE — 94660 CPAP INITIATION&MGMT: CPT

## 2022-07-30 PROCEDURE — 96372 THER/PROPH/DIAG INJ SC/IM: CPT | Performed by: NURSE PRACTITIONER

## 2022-07-30 PROCEDURE — 97530 THERAPEUTIC ACTIVITIES: CPT

## 2022-07-30 PROCEDURE — 25000003 PHARM REV CODE 250: Performed by: NURSE PRACTITIONER

## 2022-07-30 PROCEDURE — 83735 ASSAY OF MAGNESIUM: CPT | Performed by: NURSE PRACTITIONER

## 2022-07-30 PROCEDURE — 25000003 PHARM REV CODE 250: Performed by: INTERNAL MEDICINE

## 2022-07-30 PROCEDURE — 99900035 HC TECH TIME PER 15 MIN (STAT)

## 2022-07-30 PROCEDURE — 63600175 PHARM REV CODE 636 W HCPCS: Performed by: NURSE PRACTITIONER

## 2022-07-30 PROCEDURE — G0378 HOSPITAL OBSERVATION PER HR: HCPCS

## 2022-07-30 PROCEDURE — 36415 COLL VENOUS BLD VENIPUNCTURE: CPT | Performed by: NURSE PRACTITIONER

## 2022-07-30 PROCEDURE — 96376 TX/PRO/DX INJ SAME DRUG ADON: CPT

## 2022-07-30 PROCEDURE — 94761 N-INVAS EAR/PLS OXIMETRY MLT: CPT

## 2022-07-30 PROCEDURE — 85025 COMPLETE CBC W/AUTO DIFF WBC: CPT | Performed by: NURSE PRACTITIONER

## 2022-07-30 PROCEDURE — 80048 BASIC METABOLIC PNL TOTAL CA: CPT | Performed by: NURSE PRACTITIONER

## 2022-07-30 RX ORDER — TRAMADOL HYDROCHLORIDE 50 MG/1
50 TABLET ORAL EVERY 6 HOURS PRN
Status: DISCONTINUED | OUTPATIENT
Start: 2022-07-30 | End: 2022-07-31 | Stop reason: HOSPADM

## 2022-07-30 RX ADMIN — QUETIAPINE FUMARATE 200 MG: 100 TABLET ORAL at 10:07

## 2022-07-30 RX ADMIN — ASPIRIN 81 MG: 81 TABLET, COATED ORAL at 08:07

## 2022-07-30 RX ADMIN — HYDROMORPHONE HYDROCHLORIDE 0.5 MG: 1 INJECTION, SOLUTION INTRAMUSCULAR; INTRAVENOUS; SUBCUTANEOUS at 06:07

## 2022-07-30 RX ADMIN — TIZANIDINE 4 MG: 2 TABLET ORAL at 08:07

## 2022-07-30 RX ADMIN — TIZANIDINE 4 MG: 2 TABLET ORAL at 10:07

## 2022-07-30 RX ADMIN — HYDROMORPHONE HYDROCHLORIDE 0.5 MG: 1 INJECTION, SOLUTION INTRAMUSCULAR; INTRAVENOUS; SUBCUTANEOUS at 02:07

## 2022-07-30 RX ADMIN — FUROSEMIDE 40 MG: 10 INJECTION, SOLUTION INTRAMUSCULAR; INTRAVENOUS at 05:07

## 2022-07-30 RX ADMIN — ATORVASTATIN CALCIUM 80 MG: 40 TABLET, FILM COATED ORAL at 10:07

## 2022-07-30 RX ADMIN — PANTOPRAZOLE SODIUM 40 MG: 40 TABLET, DELAYED RELEASE ORAL at 08:07

## 2022-07-30 RX ADMIN — HYDROMORPHONE HYDROCHLORIDE 0.5 MG: 1 INJECTION, SOLUTION INTRAMUSCULAR; INTRAVENOUS; SUBCUTANEOUS at 08:07

## 2022-07-30 RX ADMIN — LIDOCAINE 1 PATCH: 50 PATCH CUTANEOUS at 08:07

## 2022-07-30 RX ADMIN — ACETAMINOPHEN 650 MG: 325 TABLET ORAL at 05:07

## 2022-07-30 RX ADMIN — HYDROMORPHONE HYDROCHLORIDE 0.5 MG: 1 INJECTION, SOLUTION INTRAMUSCULAR; INTRAVENOUS; SUBCUTANEOUS at 12:07

## 2022-07-30 RX ADMIN — TRAZODONE HYDROCHLORIDE 300 MG: 100 TABLET ORAL at 10:07

## 2022-07-30 RX ADMIN — LEVOTHYROXINE SODIUM 137 MCG: 25 TABLET ORAL at 05:07

## 2022-07-30 RX ADMIN — TRAMADOL HYDROCHLORIDE 50 MG: 50 TABLET, COATED ORAL at 03:07

## 2022-07-30 RX ADMIN — ENOXAPARIN SODIUM 40 MG: 100 INJECTION SUBCUTANEOUS at 04:07

## 2022-07-30 RX ADMIN — DOCUSATE SODIUM AND SENNOSIDES 1 TABLET: 8.6; 5 TABLET, FILM COATED ORAL at 08:07

## 2022-07-30 RX ADMIN — FUROSEMIDE 40 MG: 10 INJECTION, SOLUTION INTRAMUSCULAR; INTRAVENOUS at 09:07

## 2022-07-30 RX ADMIN — FLUOXETINE 60 MG: 20 CAPSULE ORAL at 08:07

## 2022-07-30 NOTE — ASSESSMENT & PLAN NOTE
Lymphedema of both lower extremities   -acute on chronic in setting of noncompliance with low sodium diet  -CHF pathway initiated at admission  -HDS at admission, remains so  -troponin trending WNL  -TTE noted EF 60%, DD, CVP 3  -diuresis initiated IVP lasix >> continue  -hold home PO lasix   -currently net negative ~4.5 liters  -no further home GDMT for CHF although EF now recovered at 60%  -RD consulted for dietary education  -Wound Care consulted for LE edema evaluation and treatment   -BLE edema noted, wrapped and compression stockings in place  -elevate extremities as able   -PT/OT evaluations ordered   -referral to Outpt Lymphedema therapy placed  -continue tele monitoring  -cardiac diet with fluid restriction  -strict I&Os and daily weights  -EKG PRN concerns   -cont IV lasix

## 2022-07-30 NOTE — NURSING
Pt's /53, pt asking for PRN dilaudid, Dr Martel ok to give dilaudid and recheck BP before giving lasix.

## 2022-07-30 NOTE — SUBJECTIVE & OBJECTIVE
Interval History:  No overnight events. Diuresing well. Pt resting comfortably in bed.      Review of Systems   Constitutional:  Positive for activity change. Negative for appetite change, chills, diaphoresis, fatigue and fever.   HENT:  Negative for congestion and sore throat.    Respiratory:  Negative for cough, chest tightness, shortness of breath and wheezing.    Cardiovascular:  Positive for leg swelling. Negative for chest pain and palpitations.   Gastrointestinal:  Negative for abdominal distention, abdominal pain, constipation, diarrhea, nausea and vomiting.   Genitourinary:  Negative for decreased urine volume, difficulty urinating and urgency.   Musculoskeletal:  Positive for arthralgias (chronic), back pain (chronic) and gait problem.   Skin:  Negative for wound.   Neurological:  Negative for dizziness, syncope, weakness, light-headedness and headaches.   Psychiatric/Behavioral:  The patient is not nervous/anxious.    Objective:     Vital Signs (Most Recent):  Temp: 97.1 °F (36.2 °C) (07/30/22 1205)  Pulse: (!) 57 (07/30/22 1205)  Resp: 16 (07/30/22 1246)  BP: (!) 125/57 (07/30/22 1205)  SpO2: (!) 93 % (07/30/22 1226)   Vital Signs (24h Range):  Temp:  [96.2 °F (35.7 °C)-97.9 °F (36.6 °C)] 97.1 °F (36.2 °C)  Pulse:  [49-63] 57  Resp:  [16-20] 16  SpO2:  [90 %-97 %] 93 %  BP: ()/(46-67) 125/57     Weight: 88.2 kg (194 lb 7.1 oz)  Body mass index is 33.38 kg/m².    Intake/Output Summary (Last 24 hours) at 7/30/2022 1348  Last data filed at 7/30/2022 0600  Gross per 24 hour   Intake 120 ml   Output 3850 ml   Net -3730 ml        Physical Exam  Vitals and nursing note reviewed.   Constitutional:       General: She is not in acute distress.     Appearance: Normal appearance. She is well-developed.   HENT:      Head: Normocephalic and atraumatic.      Mouth/Throat:      Dentition: Normal dentition.   Eyes:      General: Lids are normal.      Extraocular Movements: Extraocular movements intact.       Conjunctiva/sclera: Conjunctivae normal.   Cardiovascular:      Rate and Rhythm: Normal rate and regular rhythm.      Heart sounds: Normal heart sounds.   Pulmonary:      Effort: Pulmonary effort is normal.      Breath sounds: Normal breath sounds.   Chest:      Chest wall: No tenderness.   Abdominal:      General: Bowel sounds are normal. There is no distension.      Palpations: Abdomen is soft.      Tenderness: There is no abdominal tenderness.   Musculoskeletal:         General: Normal range of motion.      Cervical back: Neck supple.      Right lower leg: Edema present.      Left lower leg: Edema present.   Skin:     General: Skin is warm and dry.      Findings: No erythema or rash.   Neurological:      Mental Status: She is alert and oriented to person, place, and time.     Significant Labs: All pertinent labs within the past 24 hours have been reviewed.  CBC:   Recent Labs   Lab 07/29/22  0333 07/30/22  0333   WBC 4.76 3.74*   HGB 9.5* 10.7*   HCT 31.4* 34.3*    213       CMP:   Recent Labs   Lab 07/29/22  0332 07/30/22  0333    139   K 3.8 4.1   CL 96 95   CO2 33* 34*   GLU 92 98   BUN 10 10   CREATININE 1.0 0.9   CALCIUM 8.8 9.4   ANIONGAP 11 10   EGFRNONAA 59* >60       Significant Imaging: I have reviewed all pertinent imaging results/findings within the past 24 hours.

## 2022-07-30 NOTE — ASSESSMENT & PLAN NOTE
-reports mechanical falls that she attributes to chronic LE swelling and weakness however suspect polypharmacy and narcotics play a role  -uses walker at baseline  -PT/OT following  -resume HH with PT/OT therapies at [place]  -fall precautions

## 2022-07-30 NOTE — PROGRESS NOTES
[facility]  Progress Note    Patient Name: Tasha Hawley  MRN: [id number]  Patient Class: OP- Observation   Admission Date: 7/27/2022  Length of Stay: 0 days  Attending Physician: Braydon Martel MD  Primary Care Provider: Mesfin Hodges Ii, MD        Subjective:     Principal Problem:Acute on chronic diastolic heart failure        HPI:  Tasha Hawley is a 66-year-old female with a history of Anxiety, Arthritis, carotid artery occlusion, CHF, CAD (subtotaled LAD with collateral), depression, hypothyroid, Iron deficiency anemia, renal disorder, sleep apnea on CPAP, anticoagulant long-term use, and chronically severe bilateral lower extremity edema who presented to the ED for complaint of shortness of breath, 25 lb weight gain over the last few days, worsening bilateral lower extremity swelling, and hand swelling. She reports taking lasix 20 mg orally twice daily without improvement. She reports she went to her doctor today and was instructed to come to the ED for possible admission for heart failure exacerbation. Patient denies chills, fevers, nausea, vomiting, abdominal pain and all other symptoms currently.    In the ED: BNP 17, troponin 0.006. CXR: Cardiomegaly and findings suggestive of interstitial edema/CHF, similar to prior examination. Given 80 mg IV lasix. Patient on RA sats 92-96%. Admitted for observation to [facility] for further evaluation.          Overview/Hospital Course:  Ms. Hawley is a 66 YOF with PMHx of history CVA with dysarthria, anxiety and depression, chronic insomnia, hypothyroidism, CECI not on CPAP, chronic low back pain with indwelling narcotic epidural pump, chronic constipation likely related to narcotic use, neurogenic bladder with suprapubic catheter (followed by Urology), recurrent UTI's, CAD s/p PCI (midLAD PCI in 2016), ICM, and chronic BLE lymphedema.      She presented to [facility] ED from  due to shortness of breath, 25 lb  weight gain over the last few days, worsening bilateral lower extremity swelling, and hand swelling. She reported compliance with home medications including diuretic however does endorse noncompliance with low sodium diet and has high salt loads on routinely.    She was initiated on CHF pathway initiated at admission and diuresis initiated IVP lasix, currently net negative 4.5 liters with improvement in LE edema. Wound Care following, currently BLEs wrapped and compression stockings in place; referral to Outpt Lymphedema therapy placed. PT/OT following. RD consulted for dietary education.    Disposition plans: return home with family when medically ready. Resume HH orders at [place].       Interval History:  No overnight events. Diuresing well. Pt resting comfortably in bed.      Review of Systems   Constitutional:  Positive for activity change. Negative for appetite change, chills, diaphoresis, fatigue and fever.   HENT:  Negative for congestion and sore throat.    Respiratory:  Negative for cough, chest tightness, shortness of breath and wheezing.    Cardiovascular:  Positive for leg swelling. Negative for chest pain and palpitations.   Gastrointestinal:  Negative for abdominal distention, abdominal pain, constipation, diarrhea, nausea and vomiting.   Genitourinary:  Negative for decreased urine volume, difficulty urinating and urgency.   Musculoskeletal:  Positive for arthralgias (chronic), back pain (chronic) and gait problem.   Skin:  Negative for wound.   Neurological:  Negative for dizziness, syncope, weakness, light-headedness and headaches.   Psychiatric/Behavioral:  The patient is not nervous/anxious.    Objective:     Vital Signs (Most Recent):  Temp: 97.1 °F (36.2 °C) (07/30/22 1205)  Pulse: (!) 57 (07/30/22 1205)  Resp: 16 (07/30/22 1246)  BP: (!) 125/57 (07/30/22 1205)  SpO2: (!) 93 % (07/30/22 1226)   Vital Signs (24h Range):  Temp:  [96.2 °F (35.7 °C)-97.9 °F (36.6 °C)] 97.1 °F (36.2 °C)  Pulse:  [49-63]  57  Resp:  [16-20] 16  SpO2:  [90 %-97 %] 93 %  BP: ()/(46-67) 125/57     Weight: 88.2 kg (194 lb 7.1 oz)  Body mass index is 33.38 kg/m².    Intake/Output Summary (Last 24 hours) at 7/30/2022 1348  Last data filed at 7/30/2022 0600  Gross per 24 hour   Intake 120 ml   Output 3850 ml   Net -3730 ml        Physical Exam  Vitals and nursing note reviewed.   Constitutional:       General: She is not in acute distress.     Appearance: Normal appearance. She is well-developed.   HENT:      Head: Normocephalic and atraumatic.      Mouth/Throat:      Dentition: Normal dentition.   Eyes:      General: Lids are normal.      Extraocular Movements: Extraocular movements intact.      Conjunctiva/sclera: Conjunctivae normal.   Cardiovascular:      Rate and Rhythm: Normal rate and regular rhythm.      Heart sounds: Normal heart sounds.   Pulmonary:      Effort: Pulmonary effort is normal.      Breath sounds: Normal breath sounds.   Chest:      Chest wall: No tenderness.   Abdominal:      General: Bowel sounds are normal. There is no distension.      Palpations: Abdomen is soft.      Tenderness: There is no abdominal tenderness.   Musculoskeletal:         General: Normal range of motion.      Cervical back: Neck supple.      Right lower leg: Edema present.      Left lower leg: Edema present.   Skin:     General: Skin is warm and dry.      Findings: No erythema or rash.   Neurological:      Mental Status: She is alert and oriented to person, place, and time.     Significant Labs: All pertinent labs within the past 24 hours have been reviewed.  CBC:   Recent Labs   Lab 07/29/22  0333 07/30/22  0333   WBC 4.76 3.74*   HGB 9.5* 10.7*   HCT 31.4* 34.3*    213       CMP:   Recent Labs   Lab 07/29/22  0332 07/30/22  0333    139   K 3.8 4.1   CL 96 95   CO2 33* 34*   GLU 92 98   BUN 10 10   CREATININE 1.0 0.9   CALCIUM 8.8 9.4   ANIONGAP 11 10   EGFRNONAA 59* >60       Significant Imaging: I have reviewed all pertinent  imaging results/findings within the past 24 hours.      Assessment/Plan:      * Acute on chronic diastolic heart failure  Lymphedema of both lower extremities   -acute on chronic in setting of noncompliance with low sodium diet  -CHF pathway initiated at admission  -HDS at admission, remains so  -troponin trending WNL  -TTE noted EF 60%, DD, CVP 3  -diuresis initiated IVP lasix >> continue  -hold home PO lasix   -currently net negative ~4.5 liters  -no further home GDMT for CHF although EF now recovered at 60%  -RD consulted for dietary education  -Wound Care consulted for LE edema evaluation and treatment   -BLE edema noted, wrapped and compression stockings in place  -elevate extremities as able   -PT/OT evaluations ordered   -referral to Outpt Lymphedema therapy placed  -continue tele monitoring  -cardiac diet with fluid restriction  -strict I&Os and daily weights  -EKG PRN concerns   -cont IV lasix    Class 1 obesity due to excess calories in adult  Body mass index is 33.38 kg/m². Morbid obesity complicates all aspects of disease management from diagnostic modalities to treatment. Weight loss encouraged and health benefits explained to patient.     Suprapubic catheter        Insomnia due to medical condition  -chronic  -continue home seroquel and trazodone     S/P insertion of intrathecal pump        S/P insertion of spinal cord stimulator        Lymphedema of both lower extremities  -Worsening BLE edema up to thighs with legs wrapped  -Takes lasix 20 mg BID at home without much relief  -See CHF      Primary hypothyroidism  -chronic  -continue home synthroid    Frequent falls  -reports mechanical falls that she attributes to chronic LE swelling and weakness however suspect polypharmacy and narcotics play a role  -uses walker at baseline  -PT/OT following  -resume HH with PT/OT therapies at [place]  -fall precautions    Neurogenic bladder  Urinary retention  Suprapubic catheter  -chronic, suprapubic cath in place,  draining well  -follows with Urology (Milena) and recently had catheter exchanges  -monitored I&Os    Coronary artery disease of native artery of native heart with stable angina pectoris  -Patient with known CAD which is controlled   -Will continue ASA and Statin and monitor for S/Sx of angina/ACS.   -Continue to monitor on telemetry.   -Followed by [facility]    GERD (gastroesophageal reflux disease)  -chronic  -continue home protonix    Narcotic dependency, continuous        Chronic pain syndrome  Narcotic dependency, continuous  S/P insertion of intrathecal pump  S/P spinal cord stimulator   Cervical myelopathy  -chronic; patient with dilaudid intrathecal pump  -continue home medications  -continue PRN PO and IV supportive care as indicated  -bowel regimen and PRN constipation medication  -fall precautions  -PT/OT following     Major depressive disorder, recurrent, mild  Generalized anxiety disorder  Patient has recurrent depression which is mild and is currently controlled. Will Continue anti-depressant medications. Continue home anxiety medications. We will not consult psychiatry at this time. Patient does not display psychosis at this time. Continue to monitor closely and adjust plan of care as needed.    Iron deficiency anemia  -Hgb stable at 9.1 at admission >>> continues to trend so   -no replacement supplements taken at home  -transfuse for hgb < 7  -monitor    Sleep apnea  -not on CPAP per PCP note  -patient informed nurse that she sometimes uses CPAP at home  -CPAP QHS ordered     Generalized anxiety disorder  Major depressive disorder, recurrent, mild  -chronic  -continue home therapies  -PRN supportive care as clinically indicated   -monitor       VTE Risk Mitigation (From admission, onward)         Ordered     enoxaparin injection 40 mg  Daily         07/28/22 0013     IP VTE HIGH RISK PATIENT  Once         07/28/22 0013     Place sequential compression device  Until discontinued          07/28/22 0013                Discharge Planning   JACKIE:      Code Status: Full Code   Is the patient medically ready for discharge?:     Reason for patient still in hospital (select all that apply): Patient trending condition, Laboratory test and Treatment  Discharge Plan A: Home Health   Discharge Delays: None known at this time              Braydon Martel MD  Department of Hospital Medicine   [facility]

## 2022-07-30 NOTE — PHARMACY MED REC
"[facility]           Pharmacy  Admission Medication Reconciliation     The home medication history was taken by Genny Eugene PharmD.      Medication history obtained from Medications listed below were obtained from: Patient/family    Based on information gathered for medication list, you may go to "Admission" then "Reconcile Home Medications" tabs to review and/or act upon those items.      The home medication list has been updated by the Pharmacy department.    Please read ALL comments highlighted in yellow.    Please address this information as you see fit.     Feel free to contact us if you have any questions or require assistance.    The current inpatient medication list has been compared to the home medication list and the following discrepancies were noted:     Patient reports he/she IS TAKING the following which was not ordered upon admit  o Gemtessa 75mg daily  o Potassium chloride SA 20 Meq twice daily      No current facility-administered medications on file prior to encounter.     Current Outpatient Medications on File Prior to Encounter   Medication Sig Dispense Refill    aspirin (ECOTRIN) 81 MG EC tablet Take 1 tablet (81 mg total) by mouth once daily.  0    atorvastatin (LIPITOR) 80 MG tablet TAKE ONE TABLET BY MOUTH EVERY DAY (Patient taking differently: Take by mouth every evening.) 90 tablet 3    FLUoxetine 20 MG capsule Take 3 capsules (60 mg total) by mouth once daily. 270 capsule 1    furosemide (LASIX) 20 MG tablet Take 1 tablet (20 mg total) by mouth once daily. 30 tablet 0    levothyroxine (SYNTHROID) 137 MCG Tab tablet Take 1 tablet (137 mcg total) by mouth before breakfast. 30 tablet 0    lidocaine (LIDODERM) 5 % daily as needed.       pantoprazole (PROTONIX) 40 MG tablet TAKE ONE TABLET BY MOUTH EVERY DAY (Patient taking differently: Take by mouth every morning.) 90 tablet 3    potassium chloride SA (K-DUR,KLOR-CON) 20 MEQ tablet Take 1 tablet (20 mEq " total) by mouth 2 (two) times daily. 180 tablet 3    QUEtiapine (SEROQUEL) 100 MG Tab TAKE 2 TABLETS (200 MG) BY MOUTH NIGHTLY 180 tablet 3    senna (SENNA LAX) 8.6 mg tablet Take 2 tablets by mouth 2 (two) times daily.      tiZANidine (ZANAFLEX) 4 MG tablet Take 4 mg by mouth 2 (two) times daily.      traZODone (DESYREL) 100 MG tablet Take 3 tablets (300 mg total) by mouth every evening. 270 tablet 0    vibegron (GEMTESA) 75 mg Tab Take 1 tablet by mouth Daily. 30 tablet 3    acyclovir 5% (ZOVIRAX) 5 % ointment Apply topically 5 (five) times daily. 15 g 0    butalbital-acetaminophen-caffeine -40 mg (FIORICET, ESGIC) -40 mg per tablet Take 1 tablet by mouth daily as needed. 15 tablet 0    HYDROcodone-acetaminophen (NORCO)  mg per tablet Take by mouth every 24 hours as needed.      intrathecal pain pump compound Hydromorphone (7.5 mg/mL) infusion at 3.6799 mg/day (0.1533 mg/hr) out of a total reservoir volume of 37.3 mL  Pump filled every 2 months      nitroGLYCERIN (NITROSTAT) 0.4 MG SL tablet Place 1 tablet (0.4 mg total) under the tongue every 5 (five) minutes as needed for Chest pain. 25 tablet 3    ondansetron (ZOFRAN-ODT) 4 MG TbDL Take by mouth.      promethazine (PHENERGAN) 25 MG tablet Take 25 mg by mouth every 6 (six) hours as needed for Nausea.      [DISCONTINUED] torsemide (DEMADEX) 100 MG Tab TAKE ONE TABLET BY MOUTH EVERY DAY 90 tablet 0       Please address this information as you see fit.  Feel free to contact us if you have any questions or require assistance.    Genny Eugene, PharmD  [phone]                  .

## 2022-07-30 NOTE — PT/OT/SLP PROGRESS
Physical Therapy Treatment    Patient Name:  Tasha Hawley   MRN:  [id number]    Recommendations:     Discharge Recommendations:  other (see comments) (post acute placement)   Discharge Equipment Recommendations: other (see comments) (TBD)   Barriers to discharge: Decreased caregiver support    Assessment:     Tasha Hawley is a 66 y.o. female admitted with a medical diagnosis of Acute on chronic diastolic heart failure.  She presents with the following impairments/functional limitations:  weakness, impaired endurance, impaired self care skills, impaired functional mobility, gait instability, impaired balance, decreased coordination, decreased upper extremity function, decreased lower extremity function, decreased safety awareness, pain, decreased ROM, impaired coordination, impaired fine motor, impaired cardiopulmonary response to activity Pt has high pain making movements and activities difficult her. Pt is afraid of further exacerbations of symptoms .    Rehab Prognosis: Good; patient would benefit from acute skilled PT services to address these deficits and reach maximum level of function.    Recent Surgery: * No surgery found *   12 back surgeries from the past    Plan:     During this hospitalization, patient to be seen 3 x/week to address the identified rehab impairments via gait training, therapeutic activities, therapeutic exercises, neuromuscular re-education and progress toward the following goals:    · Plan of Care Expires:  08/29/22    Subjective     Chief Complaint: Pt complains of bilateral lower extremity pain and lower back pain  Patient/Family Comments/goals: Decrease her overall pain  Pain/Comfort:  · Pain Rating 1: 10/10  · Location - Side 1: Bilateral  · Location 1: back (back and bilateral lower extremities)  · Pain Addressed 1: Reposition, Nurse notified  · Pain Rating Post-Intervention 1: 10/10      Objective:     Communicated with nurse prior to session.  Patient found HOB elevated  with telemetry (suprapubic catheter) upon PT entry to room.     General Precautions: Standard, contact   Orthopedic Precautions:N/A   Braces: N/A  Respiratory Status: Room air     Functional Mobility:  · Bed Mobility:     · Rolling Right: stand by assistance  · Scooting: stand by assistance  · Bridging: stand by assistance  · Supine to Sit: stand by assistance  · Sit to Supine: minimum assistance  · Transfers:     · Sit to Stand:  contact guard assistance with rolling walker  · Balance: Poor+      AM-PAC 6 CLICK MOBILITY  Turning over in bed (including adjusting bedclothes, sheets and blankets)?: 3  Sitting down on and standing up from a chair with arms (e.g., wheelchair, bedside commode, etc.): 2  Moving from lying on back to sitting on the side of the bed?: 3  Moving to and from a bed to a chair (including a wheelchair)?: 1  Need to walk in hospital room?: 1  Climbing 3-5 steps with a railing?: 1  Basic Mobility Total Score: 11       Therapeutic Activities and Exercises:   Pt performed supine to sit with SPV. While EOB, pt performed knee extensions, hip flexion, and ankle pumps 1x10. Pt then performed sit to stand with RW with CGA 5x. Pt returned to supine with min A for lower extremities. Pt performed ankle pumps and knee flexion/extension exercises 10x each. Pt was educated on the need to perform exercises while in bed.     Patient left supine with bed alarm on and nurse notified..    GOALS:   Multidisciplinary Problems     Physical Therapy Goals        Problem: Physical Therapy    Goal Priority Disciplines Outcome Goal Variances Interventions   Physical Therapy Goal     PT, PT/OT Ongoing, Progressing     Description: Goals to be met by: 22     Patient will increase functional independence with mobility by performin. Supine to sit with Supervision  2. Sit to supine with Supervision  3. Sit to stand transfer with Supervision  4. Bed to chair transfer with Supervision using Rolling Walker  5. Gait  x  100 feet with Supervision using Rolling Walker.                      Time Tracking:     PT Received On: 07/30/22  PT Start Time: 0854     PT Stop Time: 0932  PT Total Time (min): 38 min     Billable Minutes: Therapeutic Activity 8 and Therapeutic Exercise 24    Treatment Type: Treatment  PT/PTA: PT     PTA Visit Number: 0     07/30/2022

## 2022-07-30 NOTE — NURSING
BP 80s/40s. Pt nonsymptomatic. Requesting trazodone tizanidine quetiapine and hydromorhone. Per NP Queta francis to give quetiapine and trazodone.

## 2022-07-30 NOTE — PLAN OF CARE
Pt AAO4. RA, CPAP at night. No signs of distress, but complains of constant chronic pain from back  and bilateral LE. Pt given IV dilaudid x1 for leg pain and tylenol x2 for headache. Suprapubic cath in place. Bilateral LE wrapped in dressing. Fall precautions maintained. Bed low, call light within reach. Will continue to monitor.

## 2022-07-30 NOTE — NURSING
Patient alert and awake. On Contact isolation for ESBL, MDRO urine. Suprapubic catheter intact. Pt claims she is allergic to oxy,  hand informed and put in PRN tramadol. Call light within reach. Bed alarm on.

## 2022-07-31 VITALS
DIASTOLIC BLOOD PRESSURE: 70 MMHG | SYSTOLIC BLOOD PRESSURE: 129 MMHG | HEART RATE: 67 BPM | OXYGEN SATURATION: 93 % | HEIGHT: 64 IN | BODY MASS INDEX: 33.2 KG/M2 | RESPIRATION RATE: 16 BRPM | WEIGHT: 194.44 LBS | TEMPERATURE: 96 F

## 2022-07-31 LAB
ANION GAP SERPL CALC-SCNC: 11 MMOL/L (ref 8–16)
BUN SERPL-MCNC: 14 MG/DL (ref 8–23)
CALCIUM SERPL-MCNC: 9.2 MG/DL (ref 8.7–10.5)
CHLORIDE SERPL-SCNC: 95 MMOL/L (ref 95–110)
CO2 SERPL-SCNC: 32 MMOL/L (ref 23–29)
CREAT SERPL-MCNC: 0.9 MG/DL (ref 0.5–1.4)
EST. GFR  (AFRICAN AMERICAN): >60 ML/MIN/1.73 M^2
EST. GFR  (NON AFRICAN AMERICAN): >60 ML/MIN/1.73 M^2
GLUCOSE SERPL-MCNC: 93 MG/DL (ref 70–110)
POTASSIUM SERPL-SCNC: 3.8 MMOL/L (ref 3.5–5.1)
SODIUM SERPL-SCNC: 138 MMOL/L (ref 136–145)

## 2022-07-31 PROCEDURE — 25000003 PHARM REV CODE 250: Performed by: NURSE PRACTITIONER

## 2022-07-31 PROCEDURE — 94761 N-INVAS EAR/PLS OXIMETRY MLT: CPT

## 2022-07-31 PROCEDURE — 80048 BASIC METABOLIC PNL TOTAL CA: CPT | Performed by: NURSE PRACTITIONER

## 2022-07-31 PROCEDURE — 63600175 PHARM REV CODE 636 W HCPCS: Performed by: NURSE PRACTITIONER

## 2022-07-31 PROCEDURE — 99900035 HC TECH TIME PER 15 MIN (STAT)

## 2022-07-31 PROCEDURE — G0378 HOSPITAL OBSERVATION PER HR: HCPCS

## 2022-07-31 PROCEDURE — 36415 COLL VENOUS BLD VENIPUNCTURE: CPT | Performed by: NURSE PRACTITIONER

## 2022-07-31 PROCEDURE — 25000003 PHARM REV CODE 250: Performed by: INTERNAL MEDICINE

## 2022-07-31 PROCEDURE — 96376 TX/PRO/DX INJ SAME DRUG ADON: CPT

## 2022-07-31 RX ORDER — FUROSEMIDE 40 MG/1
40 TABLET ORAL DAILY
Qty: 30 TABLET | Refills: 0 | Status: SHIPPED | OUTPATIENT
Start: 2022-07-31 | End: 2023-01-06 | Stop reason: SDUPTHER

## 2022-07-31 RX ORDER — LEVOTHYROXINE SODIUM 137 UG/1
137 TABLET ORAL
Qty: 30 TABLET | Refills: 0 | Status: SHIPPED | OUTPATIENT
Start: 2022-07-31 | End: 2023-01-06 | Stop reason: SDUPTHER

## 2022-07-31 RX ORDER — TRAZODONE HYDROCHLORIDE 300 MG/1
300 TABLET ORAL NIGHTLY
Qty: 30 TABLET | Refills: 0 | Status: SHIPPED | OUTPATIENT
Start: 2022-07-31 | End: 2022-09-12 | Stop reason: SDUPTHER

## 2022-07-31 RX ORDER — ASPIRIN 81 MG/1
81 TABLET ORAL DAILY
Qty: 30 TABLET | Refills: 0 | COMMUNITY
Start: 2022-07-31 | End: 2023-05-22 | Stop reason: SDUPTHER

## 2022-07-31 RX ADMIN — FUROSEMIDE 40 MG: 10 INJECTION, SOLUTION INTRAMUSCULAR; INTRAVENOUS at 08:07

## 2022-07-31 RX ADMIN — LIDOCAINE 1 PATCH: 50 PATCH CUTANEOUS at 08:07

## 2022-07-31 RX ADMIN — ASPIRIN 81 MG: 81 TABLET, COATED ORAL at 08:07

## 2022-07-31 RX ADMIN — LEVOTHYROXINE SODIUM 137 MCG: 25 TABLET ORAL at 06:07

## 2022-07-31 RX ADMIN — TRAMADOL HYDROCHLORIDE 50 MG: 50 TABLET, COATED ORAL at 06:07

## 2022-07-31 RX ADMIN — PANTOPRAZOLE SODIUM 40 MG: 40 TABLET, DELAYED RELEASE ORAL at 08:07

## 2022-07-31 RX ADMIN — HYDROMORPHONE HYDROCHLORIDE 0.5 MG: 1 INJECTION, SOLUTION INTRAMUSCULAR; INTRAVENOUS; SUBCUTANEOUS at 04:07

## 2022-07-31 RX ADMIN — TIZANIDINE 4 MG: 2 TABLET ORAL at 08:07

## 2022-07-31 RX ADMIN — HYDROMORPHONE HYDROCHLORIDE 0.5 MG: 1 INJECTION, SOLUTION INTRAMUSCULAR; INTRAVENOUS; SUBCUTANEOUS at 08:07

## 2022-07-31 RX ADMIN — FLUOXETINE 60 MG: 20 CAPSULE ORAL at 08:07

## 2022-07-31 RX ADMIN — DOCUSATE SODIUM AND SENNOSIDES 1 TABLET: 8.6; 5 TABLET, FILM COATED ORAL at 08:07

## 2022-07-31 RX ADMIN — TRAMADOL HYDROCHLORIDE 50 MG: 50 TABLET, COATED ORAL at 03:07

## 2022-07-31 NOTE — NURSING
Pt seen emptying out cathter bag. Pt educated the importance of monitoring I/Os during her stay. Pt was instructed to let RN or PCT empty her cathter, as we are strictly monitoring her output. Pt verbally stated she understood. Will continue to monitor.

## 2022-07-31 NOTE — PLAN OF CARE
Pt AAO4. RA. Cpap through part of the night. No sign of distress. Was given dilaudid x1 and tramadol x1. Pt seems noncompliant with fluid restriction as family brings in outside foods. Pt also spoken to about emptying meadows. Fall and contact precautions maintained. Bed low, alarm on, call light within reach.

## 2022-07-31 NOTE — DISCHARGE SUMMARY
[facility] Medicine  Discharge Summary      Patient Name: Tasha Hawley  MRN: [id number]  Patient Class: OP- Observation  Admission Date: 7/27/2022  Hospital Length of Stay: 0 days  Discharge Date and Time: No discharge date for patient encounter.  Attending Physician: Saran Martel MD   Discharging Provider: Saran Martel MD  Primary Care Provider: Mesfin Hodges Ii, MD      HPI:   Tasha Hawley is a 66-year-old female with a history of Anxiety, Arthritis, carotid artery occlusion, CHF, CAD (subtotaled LAD with collateral), depression, hypothyroid, Iron deficiency anemia, renal disorder, sleep apnea on CPAP, anticoagulant long-term use, and chronically severe bilateral lower extremity edema who presented to the ED for complaint of shortness of breath, 25 lb weight gain over the last few days, worsening bilateral lower extremity swelling, and hand swelling. She reports taking lasix 20 mg orally twice daily without improvement. She reports she went to her doctor today and was instructed to come to the ED for possible admission for heart failure exacerbation. Patient denies chills, fevers, nausea, vomiting, abdominal pain and all other symptoms currently.    In the ED: BNP 17, troponin 0.006. CXR: Cardiomegaly and findings suggestive of interstitial edema/CHF, similar to prior examination. Given 80 mg IV lasix. Patient on RA sats 92-96%. Admitted for observation to [facility] for further evaluation.          * No surgery found *      Hospital Course:   See individual problem list.       Goals of Care Treatment Preferences:  Code Status: Full Code      Consults:   Consults (From admission, onward)        Status Ordering Provider     Inpatient consult to Social Work  Once        Provider:  (Not yet assigned)    Acknowledged SARAN MARTEL     Inpatient consult to Registered Dietitian/Nutritionist  Once        Provider:  (Not yet assigned)    Completed CARIN CALLAHAN      Inpatient consult to Social Work/Case Management  Once        Provider:  (Not yet assigned)    Acknowledged DAVID JOHNSON     Inpatient consult to Registered Dietitian/Nutritionist  Once        Provider:  (Not yet assigned)    Completed DAVID JOHNSON          * Acute on chronic diastolic heart failure  Lymphedema of both lower extremities   -acute on chronic in setting of noncompliance with low sodium diet  -CHF pathway initiated at admission  -HDS at admission, remains so  -troponin trending WNL  -TTE noted EF 60%, DD, CVP 3  -diuresis initiated IVP lasix >> continue  -Home PO lasix previously discontinued  -currently net negative ~4.5 liters  -no further home GDMT for CHF although EF now recovered at 60%  -RD consulted for dietary education  -Wound Care consulted for LE edema evaluation and treatment   -BLE edema noted, wrapped and compression stockings in place  -elevate extremities as able   -PT/OT evaluations ordered   -referral to Outpt Lymphedema therapy placed  -continue tele monitoring  -cardiac diet with fluid restriction  -strict I&Os and daily weights  -EKG PRN concerns   -Diuresed well  -Transition to PO lasix on discharge.   - Follow up with cardiology outpatient.    Class 1 obesity due to excess calories in adult  Body mass index is 33.38 kg/m². Morbid obesity complicates all aspects of disease management from diagnostic modalities to treatment. Weight loss encouraged and health benefits explained to patient.     Suprapubic catheter        Insomnia due to medical condition  -chronic  -continue home seroquel and trazodone     S/P insertion of intrathecal pump        S/P insertion of spinal cord stimulator        Lymphedema of both lower extremities  -Worsening BLE edema up to thighs with legs wrapped  -Diuretic was previously disconitnued  -Cont lasix on discharge  -See CHF      Primary hypothyroidism  -chronic  -continue home synthroid    Frequent falls  -reports mechanical falls that  she attributes to chronic LE swelling and weakness however suspect polypharmacy and narcotics play a role  -uses walker at baseline  -PT/OT following  -resume HH with PT/OT therapies at [place]  -fall precautions    Neurogenic bladder  Urinary retention  Suprapubic catheter  -chronic, suprapubic cath in place, draining well  -follows with Urology (Milena) and recently had catheter exchanges  -monitored I&Os    Coronary artery disease of native artery of native heart with stable angina pectoris  -Patient with known CAD which is controlled   -Will continue ASA and Statin and monitor for S/Sx of angina/ACS.   -Continue to monitor on telemetry.   -Followed by [facility]    GERD (gastroesophageal reflux disease)  -chronic  -continue home protonix    Narcotic dependency, continuous        Chronic pain syndrome  Narcotic dependency, continuous  S/P insertion of intrathecal pump  S/P spinal cord stimulator   Cervical myelopathy  -chronic; patient with dilaudid intrathecal pump  -continue home medications  -continue PRN PO and IV supportive care as indicated  -bowel regimen and PRN constipation medication  -fall precautions  -PT/OT following     Major depressive disorder, recurrent, mild  Generalized anxiety disorder  Patient has recurrent depression which is mild and is currently controlled. Will Continue anti-depressant medications. Continue home anxiety medications. We will not consult psychiatry at this time. Patient does not display psychosis at this time. Continue to monitor closely and adjust plan of care as needed.    Iron deficiency anemia  -Hgb stable at 9.1 at admission >>> continues to trend so   -no replacement supplements taken at home  -transfuse for hgb < 7  -monitor    Sleep apnea  -not on CPAP per PCP note  -patient informed nurse that she sometimes uses CPAP at home  -CPAP QHS ordered     Generalized anxiety disorder  Major depressive disorder, recurrent, mild  -chronic  -continue home therapies  -PRN  supportive care as clinically indicated   -monitor       Final Active Diagnoses:    Diagnosis Date Noted POA    PRINCIPAL PROBLEM:  Acute on chronic diastolic heart failure [I50.33] 01/13/2020 Yes    Class 1 obesity due to excess calories in adult [E66.09] 04/19/2022 Yes    Suprapubic catheter [Z93.59] 02/01/2018 Not Applicable     Chronic    Insomnia due to medical condition [G47.01] 12/08/2017 Yes     Chronic    S/P insertion of spinal cord stimulator [Z96.89] 03/31/2017 Not Applicable     Chronic    S/P insertion of intrathecal pump [Z98.890] 03/31/2017 Not Applicable     Chronic    Lymphedema of both lower extremities [I89.0] 03/02/2017 Yes     Chronic    Primary hypothyroidism [E03.9] 02/02/2017 Yes     Chronic    Frequent falls [R29.6] 02/01/2017 Not Applicable    Neurogenic bladder [N31.9] 09/27/2016 Yes     Chronic    Coronary artery disease of native artery of native heart with stable angina pectoris [I25.118] 08/16/2016 Yes    GERD (gastroesophageal reflux disease) [K21.9] 08/16/2016 Yes     Chronic    Narcotic dependency, continuous [F11.20] 07/18/2014 Yes     Chronic    Chronic pain syndrome [G89.4] 05/01/2013 Yes     Chronic    Major depressive disorder, recurrent, mild [F33.0] 02/21/2013 Yes    Sleep apnea [G47.30]  Yes    Iron deficiency anemia [D50.9]  Yes    Generalized anxiety disorder [F41.1]  Yes      Problems Resolved During this Admission:       Discharged Condition: good    Disposition: Home or Self Care    Follow Up:    Patient Instructions:      Ambulatory referral/consult to Physical/Occupational Therapy   Standing Status: Future   Referral Priority: Routine Referral Type: Physical Medicine   Referral Reason: Specialty Services Required   Number of Visits Requested: 1     Diet Cardiac     Notify your health care provider if you experience any of the following:  temperature >100.4     Notify your health care provider if you experience any of the following:  difficulty  breathing or increased cough     Notify your health care provider if you experience any of the following:  persistent dizziness, light-headedness, or visual disturbances     Notify your health care provider if you experience any of the following:  increased confusion or weakness     Activity as tolerated       Significant Diagnostic Studies: Labs: All labs within the past 24 hours have been reviewed    Pending Diagnostic Studies:     None         Medications:  Reconciled Home Medications:      Medication List      CHANGE how you take these medications    atorvastatin 80 MG tablet  Commonly known as: LIPITOR  TAKE ONE TABLET BY MOUTH EVERY DAY  What changed:   · how much to take  · when to take this     furosemide 40 MG tablet  Commonly known as: LASIX  Take 1 tablet (40 mg total) by mouth once daily.  What changed:   · medication strength  · how much to take     pantoprazole 40 MG tablet  Commonly known as: PROTONIX  TAKE ONE TABLET BY MOUTH EVERY DAY  What changed:   · how much to take  · when to take this     traZODone 300 MG tablet  Commonly known as: DESYREL  Take 1 tablet (300 mg total) by mouth every evening.  What changed: medication strength        CONTINUE taking these medications    acyclovir 5% 5 % ointment  Commonly known as: ZOVIRAX  Apply topically 5 (five) times daily.     aspirin 81 MG EC tablet  Commonly known as: ECOTRIN  Take 1 tablet (81 mg total) by mouth once daily.     butalbital-acetaminophen-caffeine -40 mg -40 mg per tablet  Commonly known as: FIORICET, ESGIC  Take 1 tablet by mouth daily as needed.     FLUoxetine 20 MG capsule  Take 3 capsules (60 mg total) by mouth once daily.     GEMTESA 75 mg Tab  Generic drug: vibegron  Take 1 tablet by mouth Daily.     HYDROcodone-acetaminophen  mg per tablet  Commonly known as: NORCO  Take by mouth every 24 hours as needed.     INTRATHECAL PAIN PUMP COMPOUND  Hydromorphone (7.5 mg/mL) infusion at 3.6799 mg/day (0.1533 mg/hr) out of  a total reservoir volume of 37.3 mL  Pump filled every 2 months     levothyroxine 137 MCG Tab tablet  Commonly known as: SYNTHROID  Take 1 tablet (137 mcg total) by mouth before breakfast.     LIDOcaine 5 %  Commonly known as: LIDODERM  daily as needed.     nitroGLYCERIN 0.4 MG SL tablet  Commonly known as: NITROSTAT  Place 1 tablet (0.4 mg total) under the tongue every 5 (five) minutes as needed for Chest pain.     ondansetron 4 MG Tbdl  Commonly known as: ZOFRAN-ODT  Take by mouth.     potassium chloride SA 20 MEQ tablet  Commonly known as: K-DUR,KLOR-CON  Take 1 tablet (20 mEq total) by mouth 2 (two) times daily.     promethazine 25 MG tablet  Commonly known as: PHENERGAN  Take 25 mg by mouth every 6 (six) hours as needed for Nausea.     QUEtiapine 100 MG Tab  Commonly known as: SEROQUEL  TAKE 2 TABLETS (200 MG) BY MOUTH NIGHTLY     senna 8.6 mg tablet  Commonly known as: SENNA LAX  Take 2 tablets by mouth 2 (two) times daily.     tiZANidine 4 MG tablet  Commonly known as: ZANAFLEX  Take 4 mg by mouth 2 (two) times daily.        STOP taking these medications    torsemide 100 MG Tab  Commonly known as: DEMADEX            Indwelling Lines/Drains at time of discharge:   Lines/Drains/Airways     Drain  Duration                Suprapubic Catheter 07/19/22 1015 100% silicone 20 Fr. 12 days          Line  Duration                Subcutaneous Infusion Pump Abdomen (Comment) -- days                Time spent on the discharge of patient: 35 minutes         Braydon Martel MD  Department of Hospital Medicine  [facility]

## 2022-07-31 NOTE — ASSESSMENT & PLAN NOTE
-reports mechanical falls that she attributes to chronic LE swelling and weakness however suspect polypharmacy and narcotics play a role  -uses walker at baseline  -PT/OT following  -resume HH with PT/OT therapies at [place]  -fall precautions

## 2022-07-31 NOTE — NURSING
Report received from Brii SANDOVAL RN , care assumed . Pt awake in bed watching TV , AOX4 , patent IV , tele-monitor on , respiration unlabored on room air . Pt board has been updated with RN information and plan for today . Pt has no questions or concerns at this time. Fall/safety precautions in place.

## 2022-07-31 NOTE — PLAN OF CARE
[facility] - Telemetry  Final Discharge Assessment       Primary Care Provider: Mesfin Hodges Ii, MD    Admission Diagnosis: Shortness of breath [R06.02]  Acute exacerbation of CHF (congestive heart failure) [I50.9]  Acute on chronic congestive heart failure, unspecified heart failure type [I50.9]    Admission Date: 7/27/2022  Expected Discharge Date: 7/31/2022    Pt stated that she lives at home with her  Dangelo [phone]. Pt reported that her  will help transport her home at time of d/c. Pt stated that she has a WC, Rollator, SC, BSC, and oxygen supplied by [facility].     Future Appointments   Date Time Provider Department Center   8/1/2022  3:40 PM Shireen Mayo MD [facility] UROLOGY [facility]   8/4/2022  1:20 PM Saima Blanc MD [facility] CARDIO [facility]   8/12/2022  9:20 AM MD [facility]   8/19/2022 11:00 AM Mesfin Hodges II, MD [facility]     [facility]      Discharge Barriers Identified: (P) None    Payor: HU[employer] MANAGED MEDICARE / Plan: HU[employer]NA SNP (SPECIAL NEEDS PLAN) / Product Type: Medicare Advantage /     Extended Emergency Contact Information  Primary Emergency Contact: Dangelo Hawley  Address: [address] [place] [address] [facility]  Home Phone: [phone]  Mobile Phone: [phone]  Relation: Spouse  Secondary Emergency Contact: Lisa Thompson   [place]  Mobile Phone: [phone]  Relation: Daughter    Discharge Plan A: (P) Home Health  Discharge Plan B: (P) Home      [facility]  [address]  [place] [address]  Phone: [phone] Fax: [phone]    [facility] [place] - [place] [facility] - [address]  [address]  [place] [address]  Phone: [phone] Fax: [phone]      Initial Assessment (most recent)       Adult Discharge Assessment - 07/31/22 1439          Discharge  Assessment    Assessment Type Final Discharge Note (P)      Confirmed/corrected address, phone number and insurance Yes (P)      Source of Information patient;health record (P)      Discharge Plan A Home Health (P)      Discharge Plan B Home (P)      Discharge Barriers Identified None (P)                       Fernanda Sanchez [facility] Social Work  [phone]

## 2022-07-31 NOTE — PLAN OF CARE
[facility] - Telemetry      HOME HEALTH ORDERS  FACE TO FACE ENCOUNTER    Patient Name: Tasha Hawley  YOB: 1955    PCP: Mesfin Hodges Ii, MD   PCP Address: [address] / [place] [address]  PCP Phone Number: [phone]  PCP Fax: [phone]    Encounter Date: 7/27/22    Admit to Home Health    Diagnoses:  Active Hospital Problems    Diagnosis  POA    *Acute on chronic diastolic heart failure [I50.33]  Yes    Class 1 obesity due to excess calories in adult [E66.09]  Yes    Suprapubic catheter [Z93.59]  Not Applicable     Chronic    Insomnia due to medical condition [G47.01]  Yes     Chronic    S/P insertion of spinal cord stimulator [Z96.89]  Not Applicable     Chronic    S/P insertion of intrathecal pump [Z98.890]  Not Applicable     Chronic    Lymphedema of both lower extremities [I89.0]  Yes     Chronic    Primary hypothyroidism [E03.9]  Yes     Chronic    Frequent falls [R29.6]  Not Applicable    Neurogenic bladder [N31.9]  Yes     Chronic    Coronary artery disease of native artery of native heart with stable angina pectoris [I25.118]  Yes    GERD (gastroesophageal reflux disease) [K21.9]  Yes     Chronic    Narcotic dependency, continuous [F11.20]  Yes     Chronic    Chronic pain syndrome [G89.4]  Yes     Chronic    Major depressive disorder, recurrent, mild [F33.0]  Yes    Sleep apnea [G47.30]  Yes    Iron deficiency anemia [D50.9]  Yes    Generalized anxiety disorder [F41.1]  Yes      Resolved Hospital Problems   No resolved problems to display.       Follow Up Appointments:  Future Appointments   Date Time Provider Department Center   8/1/2022  3:40 PM Shireen Mayo MD [facility] UROLOGY [facility]   8/4/2022  1:20 PM Saima Blanc MD [facility] CARDIO [facility]   8/12/2022  9:20 AM Mesfin Hodges II, MD [facility]   8/19/2022 11:00 AM Mesfin Hodges II, MD [facility]       Allergies:  Review of patient's allergies indicates:   Allergen  Reactions    (d)-limonene flavor      Other reaction(s): difficult intubation  Other reaction(s): Difficulty breathing    Bactrim [sulfamethoxazole-trimethoprim] Anaphylaxis    Benadryl [diphenhydramine hcl] Shortness Of Breath    Fentanyl Itching, Nausea And Vomiting and Swelling             Imitrex [sumatriptan succinate] Shortness Of Breath    Percocet [oxycodone-acetaminophen] Shortness Of Breath    Topamax [topiramate] Shortness Of Breath    Vancomycin Shortness Of Breath     Rash    Butorphanol tartrate     Darvocet a500 [propoxyphene n-acetaminophen]      Other reaction(s): Difficulty breathing    White petrolatum-zinc     Zinc oxide-white petrolatum      Other reaction(s): Difficulty breathing    Latex, natural rubber Itching and Rash    Phenytoin Rash and Other (See Comments)     Trouble breathing       Medications: Review discharge medications with patient and family and provide education.    Current Facility-Administered Medications   Medication Dose Route Frequency Provider Last Rate Last Admin    acetaminophen tablet 650 mg  650 mg Oral Q6H PRN Susi Garzon NP   650 mg at 07/30/22 0504    aspirin EC tablet 81 mg  81 mg Oral Daily Susi Garzon NP   81 mg at 07/31/22 0818    atorvastatin tablet 80 mg  80 mg Oral QHS Susi Garzon NP   80 mg at 07/30/22 2216    bisacodyL suppository 10 mg  10 mg Rectal Daily PRN Yamile Mcdaniel NP        enoxaparin injection 40 mg  40 mg Subcutaneous Daily Susi Garzon NP   40 mg at 07/30/22 1603    FLUoxetine capsule 60 mg  60 mg Oral Daily Yamile Mcdaniel NP   60 mg at 07/31/22 0818    furosemide injection 40 mg  40 mg Intravenous BID Yamile Mcdaniel NP   40 mg at 07/31/22 0818    HYDROmorphone injection 0.5 mg  0.5 mg Intravenous Q4H PRN Yamile Mcdaniel NP   0.5 mg at 07/31/22 0826    levothyroxine tablet 137 mcg  137 mcg Oral Before breakfast Susi Garzon NP   137 mcg at 07/31/22 0654     LIDOcaine 5 % patch 1 patch  1 patch Transdermal Daily Yamile Mcdaniel NP   1 patch at 07/31/22 0818    melatonin tablet 6 mg  6 mg Oral Nightly PRN Susi Garzon NP   6 mg at 07/28/22 2043    miconazole NITRATE 2 % top powder   Topical (Top) BID PRN Yamile Mcdaniel NP        ondansetron injection 4 mg  4 mg Intravenous Q8H PRN Susi Garzon NP        pantoprazole EC tablet 40 mg  40 mg Oral Daily Susi Garzon NP   40 mg at 07/31/22 0818    polyethylene glycol packet 17 g  17 g Oral BID PRN Yamile Mcdaniel NP        QUEtiapine tablet 200 mg  200 mg Oral QHS Susi Garzon NP   200 mg at 07/30/22 2216    senna-docusate 8.6-50 mg per tablet 1 tablet  1 tablet Oral BID Yamile Mcdaniel NP   1 tablet at 07/31/22 0818    sodium chloride 0.9% flush 10 mL  10 mL Intravenous PRN Susi Garzon NP        tiZANidine tablet 4 mg  4 mg Oral BID Yamile Mcdaniel NP   4 mg at 07/31/22 0818    traMADoL tablet 50 mg  50 mg Oral Q6H PRN Braydon Martel MD   50 mg at 07/31/22 0644    traZODone tablet 300 mg  300 mg Oral QHS Yamile Mcdaniel NP   300 mg at 07/30/22 2215     Current Discharge Medication List      CONTINUE these medications which have CHANGED    Details   aspirin (ECOTRIN) 81 MG EC tablet Take 1 tablet (81 mg total) by mouth once daily.  Qty: 30 tablet, Refills: 0      furosemide (LASIX) 40 MG tablet Take 1 tablet (40 mg total) by mouth once daily.  Qty: 30 tablet, Refills: 0      levothyroxine (SYNTHROID) 137 MCG Tab tablet Take 1 tablet (137 mcg total) by mouth before breakfast.  Qty: 30 tablet, Refills: 0    Associated Diagnoses: Primary hypothyroidism      traZODone (DESYREL) 300 MG tablet Take 1 tablet (300 mg total) by mouth every evening.  Qty: 30 tablet, Refills: 0    Associated Diagnoses: Insomnia, unspecified type         CONTINUE these medications which have NOT CHANGED    Details   atorvastatin (LIPITOR) 80 MG tablet TAKE ONE TABLET BY MOUTH  EVERY DAY  Qty: 90 tablet, Refills: 3    Associated Diagnoses: Mixed hyperlipidemia      FLUoxetine 20 MG capsule Take 3 capsules (60 mg total) by mouth once daily.  Qty: 270 capsule, Refills: 1    Associated Diagnoses: Anxiety      lidocaine (LIDODERM) 5 % daily as needed.       pantoprazole (PROTONIX) 40 MG tablet TAKE ONE TABLET BY MOUTH EVERY DAY  Qty: 90 tablet, Refills: 3      potassium chloride SA (K-DUR,KLOR-CON) 20 MEQ tablet Take 1 tablet (20 mEq total) by mouth 2 (two) times daily.  Qty: 180 tablet, Refills: 3      QUEtiapine (SEROQUEL) 100 MG Tab TAKE 2 TABLETS (200 MG) BY MOUTH NIGHTLY  Qty: 180 tablet, Refills: 3    Associated Diagnoses: Insomnia due to medical condition      senna (SENNA LAX) 8.6 mg tablet Take 2 tablets by mouth 2 (two) times daily.      tiZANidine (ZANAFLEX) 4 MG tablet Take 4 mg by mouth 2 (two) times daily.      vibegron (GEMTESA) 75 mg Tab Take 1 tablet by mouth Daily.  Qty: 30 tablet, Refills: 3    Associated Diagnoses: Mixed stress and urge urinary incontinence      acyclovir 5% (ZOVIRAX) 5 % ointment Apply topically 5 (five) times daily.  Qty: 15 g, Refills: 0    Associated Diagnoses: Cold sore      butalbital-acetaminophen-caffeine -40 mg (FIORICET, ESGIC) -40 mg per tablet Take 1 tablet by mouth daily as needed.  Qty: 15 tablet, Refills: 0      HYDROcodone-acetaminophen (NORCO)  mg per tablet Take by mouth every 24 hours as needed.      intrathecal pain pump compound Hydromorphone (7.5 mg/mL) infusion at 3.6799 mg/day (0.1533 mg/hr) out of a total reservoir volume of 37.3 mL  Pump filled every 2 months      nitroGLYCERIN (NITROSTAT) 0.4 MG SL tablet Place 1 tablet (0.4 mg total) under the tongue every 5 (five) minutes as needed for Chest pain.  Qty: 25 tablet, Refills: 3    Associated Diagnoses: Coronary artery disease involving native coronary artery of native heart with angina pectoris      ondansetron (ZOFRAN-ODT) 4 MG TbDL Take by mouth.       promethazine (PHENERGAN) 25 MG tablet Take 25 mg by mouth every 6 (six) hours as needed for Nausea.         STOP taking these medications       torsemide (DEMADEX) 100 MG Tab Comments:   Reason for Stopping:                 I have seen and examined this patient within the last 30 days. My clinical findings that support the need for the home health skilled services and home bound status are the following:no   Weakness/numbness causing balance and gait disturbance due to Heart Failure and Weakness/Debility making it taxing to leave home.     Diet:   cardiac diet    Labs:  [facility] on 8/8/22    Referrals/ Consults  Physical Therapy to evaluate and treat. Evaluate for home safety and equipment needs; Establish/upgrade home exercise program. Perform / instruct on therapeutic exercises, gait training, transfer training, and Range of Motion.  Occupational Therapy to evaluate and treat. Evaluate home environment for safety and equipment needs. Perform/Instruct on transfers, ADL training, ROM, and therapeutic exercises.    Activities:   activity as tolerated    Nursing:   Agency to admit patient within 24 hours of hospital discharge unless specified on physician order or at patient request    SN to complete comprehensive assessment including routine vital signs. Instruct on disease process and s/s of complications to report to MD. Review/verify medication list sent home with the patient at time of discharge  and instruct patient/caregiver as needed. Frequency may be adjusted depending on start of care date.     Skilled nurse to perform up to 3 visits PRN for symptoms related to diagnosis    Notify MD if SBP > 160 or < 90; DBP > 90 or < 50; HR > 120 or < 50; Temp > 101; O2 < 88%; Other:       Ok to schedule additional visits based on staff availability and patient request on consecutive days within the home health episode.    When multiple disciplines ordered:    Start of Care occurs on Sunday - Wednesday schedule remaining  discipline evaluations as ordered on separate consecutive days following the start of care.    Thursday SOC -schedule subsequent evaluations Friday and Monday the following week.     Friday - Saturday SOC - schedule subsequent discipline evaluations on consecutive days starting Monday of the following week.    For all post-discharge communication and subsequent orders please contact patient's primary care physician.    Miscellaneous   Heart Failure:      SN to instruct on the following:    Instruct on the definition of CHF.   Instruct on the signs/sympoms of CHF to be reported.   Instruct on and monitor daily weights.   Instruct on factors that cause exacerbation.   Instruct on action, dose, schedule, and side effects of medications.   Instruct on diet as prescribed.   Instruct on activity allowed.   Instruct on life-style modifications for life long management of CHF   SN to assess compliance with daily weights, diet, medications, fluid retention,    safety precautions, activities permitted and life-style modifications.   Additional 1-2 SN visits per week as needed for signs and symptoms     of CHF exacerbation.      For Weight Gain > 2-3 lbs in 1 day or 4-6 lbs over 1 week notify PCP:  CHF DIURETIC SLIDING SCALE     Skilled nurse visits daily to instruct and monitor medication adherence until target weight. Then resume prior order frequency.     If weight gain exceeds 5 lbs over target weight, call MD  If weight gain of 3-4 lbs over target weight, then:     Increase Furosemide - current dose (mg/day) to 80mg double dose and frequency of daily until target weight achieved or maximum 3 days.     If already on max oral daily dose, see IV diuretic instructions.    After 3 days of increased oral diuretic dose, get BMP. If patient is on increased oral diuretic dose greater than 5 days, repeat BMP at day 7 of increased dose.     Potassium supplementation   Scr > 1.5 mg/dl  Scr  1.5 mg/dl    K < 3.0 - NOTIFY MD and 40 mEq  bid  40 mEq tid   K- 3.1-3.3  20 mEq bid  20 mEq tid    K 3.4-3.7  10 mEq bid  10 mEq tid      If target weight not reached after 5 days of increased oral diuretic, proceed to IV diuretic with daily patient contact to include face-to-face visit and telephone encounters.           Home Health Aide:  Physical Therapy Three times weekly and Occupational Therapy Three times weekly    Wound Care Orders  no    I certify that this patient is confined to her home and needs physical therapy and occupational therapy.

## 2022-07-31 NOTE — ASSESSMENT & PLAN NOTE
-Worsening BLE edema up to thighs with legs wrapped  -Diuretic was previously disconitnued  -Cont lasix on discharge  -See CHF

## 2022-07-31 NOTE — PROGRESS NOTES
[facility]                    Pharmacy       Discharge Medication Education    Patient ACCEPTED medication education. Pharmacy has provided education on the name, indication, and possible side effects of the medication(s) prescribed, using teach-back method.     The following medications have also been discussed, during this admission.        Medication List        CHANGE how you take these medications      atorvastatin 80 MG tablet  Commonly known as: LIPITOR  TAKE ONE TABLET BY MOUTH EVERY DAY  What changed:   how much to take  when to take this     furosemide 40 MG tablet  Commonly known as: LASIX  Take 1 tablet (40 mg total) by mouth once daily.  What changed:   medication strength  how much to take     pantoprazole 40 MG tablet  Commonly known as: PROTONIX  TAKE ONE TABLET BY MOUTH EVERY DAY  What changed:   how much to take  when to take this     traZODone 300 MG tablet  Commonly known as: DESYREL  Take 1 tablet (300 mg total) by mouth every evening.  What changed: medication strength            CONTINUE taking these medications      acyclovir 5% 5 % ointment  Commonly known as: ZOVIRAX  Apply topically 5 (five) times daily.     aspirin 81 MG EC tablet  Commonly known as: ECOTRIN  Take 1 tablet (81 mg total) by mouth once daily.     butalbital-acetaminophen-caffeine -40 mg -40 mg per tablet  Commonly known as: FIORICET, ESGIC  Take 1 tablet by mouth daily as needed.     FLUoxetine 20 MG capsule  Take 3 capsules (60 mg total) by mouth once daily.     GEMTESA 75 mg Tab  Generic drug: vibegron  Take 1 tablet by mouth Daily.     HYDROcodone-acetaminophen  mg per tablet  Commonly known as: NORCO     INTRATHECAL PAIN PUMP COMPOUND     levothyroxine 137 MCG Tab tablet  Commonly known as: SYNTHROID  Take 1 tablet (137 mcg total) by mouth before breakfast.     LIDOcaine 5 %  Commonly known as: LIDODERM     nitroGLYCERIN 0.4 MG SL tablet  Commonly known as: NITROSTAT  Place 1  tablet (0.4 mg total) under the tongue every 5 (five) minutes as needed for Chest pain.     ondansetron 4 MG Tbdl  Commonly known as: ZOFRAN-ODT     potassium chloride SA 20 MEQ tablet  Commonly known as: K-DUR,KLOR-CON  Take 1 tablet (20 mEq total) by mouth 2 (two) times daily.     promethazine 25 MG tablet  Commonly known as: PHENERGAN     QUEtiapine 100 MG Tab  Commonly known as: SEROQUEL  TAKE 2 TABLETS (200 MG) BY MOUTH NIGHTLY     senna 8.6 mg tablet  Commonly known as: SENNA LAX  Take 2 tablets by mouth 2 (two) times daily.     tiZANidine 4 MG tablet  Commonly known as: ZANAFLEX            STOP taking these medications      torsemide 100 MG Tab  Commonly known as: DEMADEX               Where to Get Your Medications        These medications were sent to [facility]  [address] [address] [address]      Hours: Mon-Fri, 8a-5:30p Phone: [phone]   furosemide 40 MG tablet  traZODone 300 MG tablet       You can get these medications from any pharmacy    Bring a paper prescription for each of these medications  levothyroxine 137 MCG Tab tablet  You don't need a prescription for these medications  aspirin 81 MG EC tablet          Thank you  Genny Eugene, PharmD  [phone]

## 2022-07-31 NOTE — RESPIRATORY THERAPY
Pt. on CPAP with documented settings. Pt appears to be in no respiratory distress. Will continue to monitor

## 2022-07-31 NOTE — PROGRESS NOTES
Patient has received discharge instructions. Instructions reviewed with pt using teachback method. All questions answered to pt satisfaction. Any non-implanted  IV access and telemetry present,  have been  removed per bedside nurse. Transport requested for discharge.            07/31/22 1604   Admission   Communication Issues? Patient Hearing;Patient Vision   Shift   Virtual Nurse - Rounding Complete  (discharge)   Virtual Nurse - Patient Verbalized Approval Of Camera Use   Safety/Activity   Patient Rounds visualized patient;clutter free environment maintained   Activity Management Up in chair - L3

## 2022-07-31 NOTE — ASSESSMENT & PLAN NOTE
Lymphedema of both lower extremities   -acute on chronic in setting of noncompliance with low sodium diet  -CHF pathway initiated at admission  -HDS at admission, remains so  -troponin trending WNL  -TTE noted EF 60%, DD, CVP 3  -diuresis initiated IVP lasix >> continue  -Home PO lasix previously discontinued  -currently net negative ~4.5 liters  -no further home GDMT for CHF although EF now recovered at 60%  -RD consulted for dietary education  -Wound Care consulted for LE edema evaluation and treatment   -BLE edema noted, wrapped and compression stockings in place  -elevate extremities as able   -PT/OT evaluations ordered   -referral to Outpt Lymphedema therapy placed  -continue tele monitoring  -cardiac diet with fluid restriction  -strict I&Os and daily weights  -EKG PRN concerns   -Diuresed well  -Transition to PO lasix on discharge.   - Follow up with cardiology outpatient.

## 2022-08-01 ENCOUNTER — OFFICE VISIT (OUTPATIENT)
Dept: UROLOGY | Facility: CLINIC | Age: 67
End: 2022-08-01
Payer: MEDICARE

## 2022-08-01 DIAGNOSIS — Z98.890 POST-OPERATIVE STATE: Primary | ICD-10-CM

## 2022-08-01 PROCEDURE — 3066F PR DOCUMENTATION OF TREATMENT FOR NEPHROPATHY: ICD-10-PCS | Mod: CPTII,95,, | Performed by: UROLOGY

## 2022-08-01 PROCEDURE — 3066F NEPHROPATHY DOC TX: CPT | Mod: CPTII,95,, | Performed by: UROLOGY

## 2022-08-01 PROCEDURE — 3044F HG A1C LEVEL LT 7.0%: CPT | Mod: CPTII,95,, | Performed by: UROLOGY

## 2022-08-01 PROCEDURE — 99024 PR POST-OP FOLLOW-UP VISIT: ICD-10-PCS | Mod: 95,,, | Performed by: UROLOGY

## 2022-08-01 PROCEDURE — 3044F PR MOST RECENT HEMOGLOBIN A1C LEVEL <7.0%: ICD-10-PCS | Mod: CPTII,95,, | Performed by: UROLOGY

## 2022-08-01 PROCEDURE — 99024 POSTOP FOLLOW-UP VISIT: CPT | Mod: 95,,, | Performed by: UROLOGY

## 2022-08-01 NOTE — PROGRESS NOTES
The patient location is: [place], [place]   The chief complaint leading to consultation is: follow up after procedure    Visit type: audio only    Time with patient:  10 minutes of total time spent on the encounter, which includes face to face time and non-face to face time preparing to see the patient (eg, review of tests), obtaining and/or reviewing separately obtained history, documenting clinical information in the electronic or other health record, independently interpreting results (not separately reported) and communicating results to the patient/family/caregiver, or care coordination (not separately reported).     Each patient to whom he or she provides medical services by telemedicine is:  (1) informed of the relationship between the physician and patient and the respective role of any other health care provider with respect to management of the patient; and (2) notified that he or she may decline to receive medical services by telemedicine and may withdraw from such care at any time.    CHIEF COMPLAINT:    Mrs. Hawley is a 66 y.o. female presenting for a follow up after Macroplastique of the urethra and Botox injection of the bladder 7/19/2022.     PRESENTING ILLNESS:    Tasha Hawley is a 66 y.o. female who states she is better from the standpoint of the leakage.  Not leaking when she stands or transfers to another chair.  She has some leakage around the tube.  Her  states there is a rash, states he thinks its because a latex catheter is in her. The catheter is clear.     Allergies:  (d)-limonene flavor; Bactrim [sulfamethoxazole-trimethoprim]; Benadryl [diphenhydramine hcl]; Fentanyl; Imitrex [sumatriptan succinate]; Percocet [oxycodone-acetaminophen]; Topamax [topiramate]; Vancomycin; Butorphanol tartrate; Darvocet a500 [propoxyphene n-acetaminophen]; White petrolatum-zinc; Zinc oxide-white petrolatum; Latex, natural rubber; and Phenytoin    Medications:  Outpatient Encounter Medications  as of 8/1/2022   Medication Sig Dispense Refill    acyclovir 5% (ZOVIRAX) 5 % ointment Apply topically 5 (five) times daily. 15 g 0    aspirin (ECOTRIN) 81 MG EC tablet Take 1 tablet (81 mg total) by mouth once daily. 30 tablet 0    atorvastatin (LIPITOR) 80 MG tablet TAKE ONE TABLET BY MOUTH EVERY DAY (Patient taking differently: Take by mouth every evening.) 90 tablet 3    butalbital-acetaminophen-caffeine -40 mg (FIORICET, ESGIC) -40 mg per tablet Take 1 tablet by mouth daily as needed. 15 tablet 0    FLUoxetine 20 MG capsule Take 3 capsules (60 mg total) by mouth once daily. 270 capsule 1    furosemide (LASIX) 40 MG tablet Take 1 tablet (40 mg total) by mouth once daily. 30 tablet 0    HYDROcodone-acetaminophen (NORCO)  mg per tablet Take by mouth every 24 hours as needed.      intrathecal pain pump compound Hydromorphone (7.5 mg/mL) infusion at 3.6799 mg/day (0.1533 mg/hr) out of a total reservoir volume of 37.3 mL  Pump filled every 2 months      levothyroxine (SYNTHROID) 137 MCG Tab tablet Take 1 tablet (137 mcg total) by mouth before breakfast. 30 tablet 0    lidocaine (LIDODERM) 5 % daily as needed.       nitroGLYCERIN (NITROSTAT) 0.4 MG SL tablet Place 1 tablet (0.4 mg total) under the tongue every 5 (five) minutes as needed for Chest pain. 25 tablet 3    ondansetron (ZOFRAN-ODT) 4 MG TbDL Take by mouth.      pantoprazole (PROTONIX) 40 MG tablet TAKE ONE TABLET BY MOUTH EVERY DAY (Patient taking differently: Take by mouth every morning.) 90 tablet 3    potassium chloride SA (K-DUR,KLOR-CON) 20 MEQ tablet Take 1 tablet (20 mEq total) by mouth 2 (two) times daily. 180 tablet 3    promethazine (PHENERGAN) 25 MG tablet Take 25 mg by mouth every 6 (six) hours as needed for Nausea.      QUEtiapine (SEROQUEL) 100 MG Tab TAKE 2 TABLETS (200 MG) BY MOUTH NIGHTLY 180 tablet 3    senna (SENNA LAX) 8.6 mg tablet Take 2 tablets by mouth 2 (two) times daily.      tiZANidine (ZANAFLEX)  4 MG tablet Take 4 mg by mouth 2 (two) times daily.      traZODone (DESYREL) 300 MG tablet Take 1 tablet (300 mg total) by mouth every evening. 30 tablet 0    vibegron (GEMTESA) 75 mg Tab Take 1 tablet by mouth Daily. 30 tablet 3         PHYSICAL EXAMINATION:    The patient generally sounds in tired, is appropriately interactive, and is in no apparent distress.    Mental: Cooperative with normal affect.    HEENT: Normal. No evidence of lymphadenopathy.    Chest:  normal inspiratory effort.      LABS:    Lab Results   Component Value Date    BUN 14 07/31/2022    CREATININE 0.9 07/31/2022       IMPRESSION:    Encounter Diagnoses   Name Primary?    Post-operative state Yes       PLAN:    1.  She has an appointment with her cardiologist to address the swelling in her LE and back.  2.  Follow up as needed.   3.  She has a clear tube, discussed that this is a silicone catheter not a latex catheter.

## 2022-08-10 ENCOUNTER — OFFICE VISIT (OUTPATIENT)
Dept: CARDIOLOGY | Facility: CLINIC | Age: 67
End: 2022-08-10
Payer: MEDICARE

## 2022-08-10 DIAGNOSIS — E66.09 CLASS 1 OBESITY DUE TO EXCESS CALORIES WITHOUT SERIOUS COMORBIDITY WITH BODY MASS INDEX (BMI) OF 31.0 TO 31.9 IN ADULT: ICD-10-CM

## 2022-08-10 DIAGNOSIS — I70.0 THORACIC AORTA ATHEROSCLEROSIS: ICD-10-CM

## 2022-08-10 DIAGNOSIS — E78.00 PURE HYPERCHOLESTEROLEMIA: ICD-10-CM

## 2022-08-10 DIAGNOSIS — Z01.818 PREOPERATIVE CLEARANCE: ICD-10-CM

## 2022-08-10 DIAGNOSIS — E78.2 MIXED HYPERLIPIDEMIA: ICD-10-CM

## 2022-08-10 DIAGNOSIS — I50.32 CHRONIC DIASTOLIC HEART FAILURE: ICD-10-CM

## 2022-08-10 DIAGNOSIS — I25.118 CORONARY ARTERY DISEASE OF NATIVE ARTERY OF NATIVE HEART WITH STABLE ANGINA PECTORIS: ICD-10-CM

## 2022-08-10 PROCEDURE — 99214 OFFICE O/P EST MOD 30 MIN: CPT | Mod: S$GLB,,, | Performed by: INTERNAL MEDICINE

## 2022-08-10 PROCEDURE — 99999 PR PBB SHADOW E&M-EST. PATIENT-LVL IV: ICD-10-PCS | Mod: PBBFAC,,, | Performed by: INTERNAL MEDICINE

## 2022-08-10 PROCEDURE — 3074F PR MOST RECENT SYSTOLIC BLOOD PRESSURE < 130 MM HG: ICD-10-PCS | Mod: CPTII,S$GLB,, | Performed by: INTERNAL MEDICINE

## 2022-08-10 PROCEDURE — 1125F PR PAIN SEVERITY QUANTIFIED, PAIN PRESENT: ICD-10-PCS | Mod: CPTII,S$GLB,, | Performed by: INTERNAL MEDICINE

## 2022-08-10 PROCEDURE — 3066F PR DOCUMENTATION OF TREATMENT FOR NEPHROPATHY: ICD-10-PCS | Mod: CPTII,S$GLB,, | Performed by: INTERNAL MEDICINE

## 2022-08-10 PROCEDURE — 3044F PR MOST RECENT HEMOGLOBIN A1C LEVEL <7.0%: ICD-10-PCS | Mod: CPTII,S$GLB,, | Performed by: INTERNAL MEDICINE

## 2022-08-10 PROCEDURE — 1100F PR PT FALLS ASSESS DOC 2+ FALLS/FALL W/INJURY/YR: ICD-10-PCS | Mod: CPTII,S$GLB,, | Performed by: INTERNAL MEDICINE

## 2022-08-10 PROCEDURE — 3288F FALL RISK ASSESSMENT DOCD: CPT | Mod: CPTII,S$GLB,, | Performed by: INTERNAL MEDICINE

## 2022-08-10 PROCEDURE — 3008F PR BODY MASS INDEX (BMI) DOCUMENTED: ICD-10-PCS | Mod: CPTII,S$GLB,, | Performed by: INTERNAL MEDICINE

## 2022-08-10 PROCEDURE — 1100F PTFALLS ASSESS-DOCD GE2>/YR: CPT | Mod: CPTII,S$GLB,, | Performed by: INTERNAL MEDICINE

## 2022-08-10 PROCEDURE — 3078F DIAST BP <80 MM HG: CPT | Mod: CPTII,S$GLB,, | Performed by: INTERNAL MEDICINE

## 2022-08-10 PROCEDURE — 99214 PR OFFICE/OUTPT VISIT, EST, LEVL IV, 30-39 MIN: ICD-10-PCS | Mod: S$GLB,,, | Performed by: INTERNAL MEDICINE

## 2022-08-10 PROCEDURE — 99999 PR PBB SHADOW E&M-EST. PATIENT-LVL IV: CPT | Mod: PBBFAC,,, | Performed by: INTERNAL MEDICINE

## 2022-08-10 PROCEDURE — 1125F AMNT PAIN NOTED PAIN PRSNT: CPT | Mod: CPTII,S$GLB,, | Performed by: INTERNAL MEDICINE

## 2022-08-10 PROCEDURE — 3078F PR MOST RECENT DIASTOLIC BLOOD PRESSURE < 80 MM HG: ICD-10-PCS | Mod: CPTII,S$GLB,, | Performed by: INTERNAL MEDICINE

## 2022-08-10 PROCEDURE — 1159F MED LIST DOCD IN RCRD: CPT | Mod: CPTII,S$GLB,, | Performed by: INTERNAL MEDICINE

## 2022-08-10 PROCEDURE — 3008F BODY MASS INDEX DOCD: CPT | Mod: CPTII,S$GLB,, | Performed by: INTERNAL MEDICINE

## 2022-08-10 PROCEDURE — 3066F NEPHROPATHY DOC TX: CPT | Mod: CPTII,S$GLB,, | Performed by: INTERNAL MEDICINE

## 2022-08-10 PROCEDURE — 3074F SYST BP LT 130 MM HG: CPT | Mod: CPTII,S$GLB,, | Performed by: INTERNAL MEDICINE

## 2022-08-10 PROCEDURE — 3044F HG A1C LEVEL LT 7.0%: CPT | Mod: CPTII,S$GLB,, | Performed by: INTERNAL MEDICINE

## 2022-08-10 PROCEDURE — 1159F PR MEDICATION LIST DOCUMENTED IN MEDICAL RECORD: ICD-10-PCS | Mod: CPTII,S$GLB,, | Performed by: INTERNAL MEDICINE

## 2022-08-10 PROCEDURE — 3288F PR FALLS RISK ASSESSMENT DOCUMENTED: ICD-10-PCS | Mod: CPTII,S$GLB,, | Performed by: INTERNAL MEDICINE

## 2022-08-10 NOTE — PROGRESS NOTES
[facility]     Subjective:    Patient ID:  Tasha Hawley is a 66 y.o. female who presents for follow-up of Pre-op Exam, Irregular Heart Beat, and Shortness of Breath    Review of patient's allergies indicates:   Allergen Reactions    (d)-limonene flavor      Other reaction(s): difficult intubation  Other reaction(s): Difficulty breathing    Bactrim [sulfamethoxazole-trimethoprim] Anaphylaxis    Benadryl [diphenhydramine hcl] Shortness Of Breath    Fentanyl Itching, Nausea And Vomiting and Swelling             Imitrex [sumatriptan succinate] Shortness Of Breath    Percocet [oxycodone-acetaminophen] Shortness Of Breath    Topamax [topiramate] Shortness Of Breath    Vancomycin Shortness Of Breath     Rash    Butorphanol tartrate     Darvocet a500 [propoxyphene n-acetaminophen]      Other reaction(s): Difficulty breathing    White petrolatum-zinc     Zinc oxide-white petrolatum      Other reaction(s): Difficulty breathing    Latex, natural rubber Itching and Rash    Phenytoin Rash and Other (See Comments)     Trouble breathing      She is here for clearance related to bone spurs and podiatry issues of both lower extremities.  Her feet are wrapped.  The surgery has not been scheduled yet.  She has no history of peripheral arterial disease.  She does not ambulate very much.  She is in a wheelchair.  She is with her  today.    She was hospitalized at [place] for diastolic heart failure in leg swelling.  She is a cigarette smoker.  She had an Electrocardiogram in July which looked normal.  Her history includes previous cardiac catheterization which she does not remember.  2016 catheterization documents subtotaled left anterior descending artery with right-to-left collaterals.  She had a nuclear stress test June 2021 showing normal ejection fraction and normal perfusion study.  She has low blood pressure.  She is on  furosemide for her leg swelling.  Her echo July 2022 confirms normal ejection fraction without valvular disease of significance.  She is on atorvastatin aspirin.  She denies chest pain.      Review of Systems   Constitutional: Negative for chills, decreased appetite, diaphoresis, fever, malaise/fatigue, night sweats, weight gain and weight loss.   HENT: Positive for hearing loss. Negative for congestion, ear discharge, ear pain, hoarse voice, nosebleeds, odynophagia, sore throat, stridor and tinnitus.    Eyes: Negative for blurred vision, discharge, double vision, pain, photophobia, redness, vision loss in left eye, vision loss in right eye, visual disturbance and visual halos.   Cardiovascular: Positive for leg swelling. Negative for chest pain, claudication, cyanosis, dyspnea on exertion, irregular heartbeat, near-syncope, orthopnea, palpitations, paroxysmal nocturnal dyspnea and syncope.   Respiratory: Positive for cough and shortness of breath. Negative for hemoptysis, sleep disturbances due to breathing, snoring, sputum production and wheezing.    Endocrine: Negative for cold intolerance, heat intolerance, polydipsia, polyphagia and polyuria.   Hematologic/Lymphatic: Negative for adenopathy and bleeding problem. Does not bruise/bleed easily.   Skin: Positive for poor wound healing. Negative for color change, dry skin, flushing, itching, nail changes, rash, skin cancer, suspicious lesions and unusual hair distribution.   Musculoskeletal: Positive for joint pain. Negative for arthritis, back pain, falls, gout, joint swelling, muscle cramps, muscle weakness, myalgias, neck pain and stiffness.   Gastrointestinal: Negative for bloating, abdominal pain, anorexia, change in bowel habit, bowel incontinence, constipation, diarrhea, dysphagia, excessive appetite, flatus, heartburn, hematemesis, hematochezia, hemorrhoids, jaundice, melena, nausea and vomiting.   Genitourinary: Negative for bladder incontinence, decreased  "libido, dysuria, flank pain, frequency, genital sores, hematuria, hesitancy, incomplete emptying, nocturia and urgency.   Neurological: Negative for aphonia, brief paralysis, difficulty with concentration, disturbances in coordination, excessive daytime sleepiness, dizziness, focal weakness, headaches, light-headedness, loss of balance, numbness, paresthesias, seizures, sensory change, tremors, vertigo and weakness.   Psychiatric/Behavioral: Negative for altered mental status, depression, hallucinations, memory loss, substance abuse, suicidal ideas and thoughts of violence. The patient does not have insomnia and is not nervous/anxious.    Allergic/Immunologic: Negative for hives and persistent infections.        Objective:       Vitals:    08/10/22 1339   BP: (!) 94/51   Pulse: 65   SpO2: (!) 91%   Weight: 82 kg (180 lb 12.4 oz)   Height: 5' 4" (1.626 m)    Physical Exam  Constitutional:       General: She is not in acute distress.     Appearance: She is well-developed. She is not diaphoretic.   HENT:      Head: Normocephalic and atraumatic.      Nose: Nose normal.   Eyes:      General: No scleral icterus.        Right eye: No discharge.      Conjunctiva/sclera: Conjunctivae normal.      Pupils: Pupils are equal, round, and reactive to light.   Neck:      Thyroid: No thyromegaly.      Vascular: No JVD.      Trachea: No tracheal deviation.   Cardiovascular:      Rate and Rhythm: Normal rate and regular rhythm.      Pulses:           Carotid pulses are 2+ on the right side and 2+ on the left side.       Radial pulses are 2+ on the right side and 2+ on the left side.        Dorsalis pedis pulses are 2+ on the right side and 2+ on the left side.        Posterior tibial pulses are 2+ on the right side and 2+ on the left side.      Heart sounds: Normal heart sounds. No murmur heard.    No friction rub. No gallop.   Pulmonary:      Effort: Pulmonary effort is normal. No respiratory distress.      Breath sounds: No stridor. " Examination of the right-upper field reveals decreased breath sounds. Examination of the left-upper field reveals decreased breath sounds. Examination of the right-middle field reveals decreased breath sounds and rhonchi. Examination of the left-middle field reveals decreased breath sounds and rhonchi. Examination of the right-lower field reveals decreased breath sounds. Examination of the left-lower field reveals decreased breath sounds. Decreased breath sounds and rhonchi present. No wheezing or rales.   Chest:      Chest wall: No tenderness.   Abdominal:      General: Bowel sounds are normal. There is no distension.      Palpations: Abdomen is soft. There is no mass.      Tenderness: There is no abdominal tenderness. There is no guarding or rebound.   Musculoskeletal:         General: No tenderness. Normal range of motion.      Cervical back: Normal range of motion and neck supple.      Right lower leg: Edema present.      Left lower leg: Edema present.   Lymphadenopathy:      Cervical: No cervical adenopathy.   Skin:     General: Skin is warm and dry.      Coloration: Skin is not pale.      Findings: No erythema or rash.   Neurological:      Mental Status: She is alert and oriented to person, place, and time.      Cranial Nerves: No cranial nerve deficit.      Coordination: Coordination normal.   Psychiatric:         Behavior: Behavior normal.         Thought Content: Thought content normal.         Judgment: Judgment normal.           Assessment:       1. Mixed hyperlipidemia    2. Thoracic aorta atherosclerosis    3. Preoperative clearance    4. Coronary artery disease of native artery of native heart with stable angina pectoris    5. Chronic diastolic heart failure    6. Pure hypercholesterolemia    7. Class 1 obesity due to excess calories without serious comorbidity with body mass index (BMI) of 31.0 to 31.9 in adult      Results for orders placed or performed during the hospital encounter of 07/27/22   CBC  auto differential   Result Value Ref Range    WBC 5.24 3.90 - 12.70 K/uL    RBC 3.18 (L) 4.00 - 5.40 M/uL    Hemoglobin 9.1 (L) 12.0 - 16.0 g/dL    Hematocrit 28.6 (L) 37.0 - 48.5 %    MCV 90 82 - 98 fL    MCH 28.6 27.0 - 31.0 pg    MCHC 31.8 (L) 32.0 - 36.0 g/dL    RDW 14.1 11.5 - 14.5 %    Platelets 187 150 - 450 K/uL    MPV 11.5 9.2 - 12.9 fL    Immature Granulocytes 0.2 0.0 - 0.5 %    Gran # (ANC) 3.0 1.8 - 7.7 K/uL    Immature Grans (Abs) 0.01 0.00 - 0.04 K/uL    Lymph # 1.3 1.0 - 4.8 K/uL    Mono # 0.7 0.3 - 1.0 K/uL    Eos # 0.3 0.0 - 0.5 K/uL    Baso # 0.01 0.00 - 0.20 K/uL    nRBC 0 0 /100 WBC    Gran % 57.4 38.0 - 73.0 %    Lymph % 24.4 18.0 - 48.0 %    Mono % 12.8 4.0 - 15.0 %    Eosinophil % 5.0 0.0 - 8.0 %    Basophil % 0.2 0.0 - 1.9 %    Differential Method Automated    Brain natriuretic peptide   Result Value Ref Range    BNP 17 0 - 99 pg/mL   Comprehensive metabolic panel   Result Value Ref Range    Sodium 141 136 - 145 mmol/L    Potassium 4.0 3.5 - 5.1 mmol/L    Chloride 101 95 - 110 mmol/L    CO2 30 (H) 23 - 29 mmol/L    Glucose 81 70 - 110 mg/dL    BUN 7 (L) 8 - 23 mg/dL    Creatinine 0.8 0.5 - 1.4 mg/dL    Calcium 9.0 8.7 - 10.5 mg/dL    Total Protein 6.8 6.0 - 8.4 g/dL    Albumin 3.3 (L) 3.5 - 5.2 g/dL    Total Bilirubin 0.3 0.1 - 1.0 mg/dL    Alkaline Phosphatase 96 55 - 135 U/L    AST 9 (L) 10 - 40 U/L    ALT <5 (L) 10 - 44 U/L    Anion Gap 10 8 - 16 mmol/L    eGFR if African American >60 >60 mL/min/1.73 m^2    eGFR if non African American >60 >60 mL/min/1.73 m^2   Troponin I   Result Value Ref Range    Troponin I 0.006 0.000 - 0.026 ng/mL   Basic metabolic panel   Result Value Ref Range    Sodium 140 136 - 145 mmol/L    Potassium 3.8 3.5 - 5.1 mmol/L    Chloride 99 95 - 110 mmol/L    CO2 31 (H) 23 - 29 mmol/L    Glucose 82 70 - 110 mg/dL    BUN 7 (L) 8 - 23 mg/dL    Creatinine 0.9 0.5 - 1.4 mg/dL    Calcium 9.1 8.7 - 10.5 mg/dL    Anion Gap 10 8 - 16 mmol/L    eGFR if African American >60 >60  mL/min/1.73 m^2    eGFR if non African American >60 >60 mL/min/1.73 m^2   Magnesium   Result Value Ref Range    Magnesium 2.1 1.6 - 2.6 mg/dL   Hemoglobin A1c   Result Value Ref Range    Hemoglobin A1C 5.9 (H) 4.0 - 5.6 %    Estimated Avg Glucose 123 68 - 131 mg/dL   CBC auto differential   Result Value Ref Range    WBC 5.39 3.90 - 12.70 K/uL    RBC 3.56 (L) 4.00 - 5.40 M/uL    Hemoglobin 9.8 (L) 12.0 - 16.0 g/dL    Hematocrit 32.1 (L) 37.0 - 48.5 %    MCV 90 82 - 98 fL    MCH 27.5 27.0 - 31.0 pg    MCHC 30.5 (L) 32.0 - 36.0 g/dL    RDW 13.8 11.5 - 14.5 %    Platelets 199 150 - 450 K/uL    MPV 10.3 9.2 - 12.9 fL    Immature Granulocytes 0.2 0.0 - 0.5 %    Gran # (ANC) 3.3 1.8 - 7.7 K/uL    Immature Grans (Abs) 0.01 0.00 - 0.04 K/uL    Lymph # 1.1 1.0 - 4.8 K/uL    Mono # 0.7 0.3 - 1.0 K/uL    Eos # 0.2 0.0 - 0.5 K/uL    Baso # 0.02 0.00 - 0.20 K/uL    nRBC 0 0 /100 WBC    Gran % 61.7 38.0 - 73.0 %    Lymph % 20.4 18.0 - 48.0 %    Mono % 12.8 4.0 - 15.0 %    Eosinophil % 4.5 0.0 - 8.0 %    Basophil % 0.4 0.0 - 1.9 %    Differential Method Automated    Basic metabolic panel   Result Value Ref Range    Sodium 140 136 - 145 mmol/L    Potassium 3.8 3.5 - 5.1 mmol/L    Chloride 96 95 - 110 mmol/L    CO2 33 (H) 23 - 29 mmol/L    Glucose 92 70 - 110 mg/dL    BUN 10 8 - 23 mg/dL    Creatinine 1.0 0.5 - 1.4 mg/dL    Calcium 8.8 8.7 - 10.5 mg/dL    Anion Gap 11 8 - 16 mmol/L    eGFR if African American >60 >60 mL/min/1.73 m^2    eGFR if non African American 59 (A) >60 mL/min/1.73 m^2   CBC auto differential   Result Value Ref Range    WBC 4.76 3.90 - 12.70 K/uL    RBC 3.41 (L) 4.00 - 5.40 M/uL    Hemoglobin 9.5 (L) 12.0 - 16.0 g/dL    Hematocrit 31.4 (L) 37.0 - 48.5 %    MCV 92 82 - 98 fL    MCH 27.9 27.0 - 31.0 pg    MCHC 30.3 (L) 32.0 - 36.0 g/dL    RDW 13.6 11.5 - 14.5 %    Platelets 197 150 - 450 K/uL    MPV 10.3 9.2 - 12.9 fL    Immature Granulocytes 0.0 0.0 - 0.5 %    Gran # (ANC) 2.7 1.8 - 7.7 K/uL    Immature Grans  (Abs) 0.00 0.00 - 0.04 K/uL    Lymph # 1.3 1.0 - 4.8 K/uL    Mono # 0.5 0.3 - 1.0 K/uL    Eos # 0.3 0.0 - 0.5 K/uL    Baso # 0.02 0.00 - 0.20 K/uL    nRBC 0 0 /100 WBC    Gran % 57.5 38.0 - 73.0 %    Lymph % 26.7 18.0 - 48.0 %    Mono % 9.9 4.0 - 15.0 %    Eosinophil % 5.5 0.0 - 8.0 %    Basophil % 0.4 0.0 - 1.9 %    Differential Method Automated    Magnesium   Result Value Ref Range    Magnesium 2.1 1.6 - 2.6 mg/dL   Basic metabolic panel   Result Value Ref Range    Sodium 139 136 - 145 mmol/L    Potassium 4.1 3.5 - 5.1 mmol/L    Chloride 95 95 - 110 mmol/L    CO2 34 (H) 23 - 29 mmol/L    Glucose 98 70 - 110 mg/dL    BUN 10 8 - 23 mg/dL    Creatinine 0.9 0.5 - 1.4 mg/dL    Calcium 9.4 8.7 - 10.5 mg/dL    Anion Gap 10 8 - 16 mmol/L    eGFR if African American >60 >60 mL/min/1.73 m^2    eGFR if non African American >60 >60 mL/min/1.73 m^2   CBC auto differential   Result Value Ref Range    WBC 3.74 (L) 3.90 - 12.70 K/uL    RBC 3.82 (L) 4.00 - 5.40 M/uL    Hemoglobin 10.7 (L) 12.0 - 16.0 g/dL    Hematocrit 34.3 (L) 37.0 - 48.5 %    MCV 90 82 - 98 fL    MCH 28.0 27.0 - 31.0 pg    MCHC 31.2 (L) 32.0 - 36.0 g/dL    RDW 13.6 11.5 - 14.5 %    Platelets 213 150 - 450 K/uL    MPV 9.9 9.2 - 12.9 fL    Immature Granulocytes 0.3 0.0 - 0.5 %    Gran # (ANC) 1.7 (L) 1.8 - 7.7 K/uL    Immature Grans (Abs) 0.01 0.00 - 0.04 K/uL    Lymph # 1.3 1.0 - 4.8 K/uL    Mono # 0.3 0.3 - 1.0 K/uL    Eos # 0.3 0.0 - 0.5 K/uL    Baso # 0.02 0.00 - 0.20 K/uL    nRBC 0 0 /100 WBC    Gran % 45.9 38.0 - 73.0 %    Lymph % 35.6 18.0 - 48.0 %    Mono % 9.1 4.0 - 15.0 %    Eosinophil % 8.6 (H) 0.0 - 8.0 %    Basophil % 0.5 0.0 - 1.9 %    Differential Method Automated    Magnesium   Result Value Ref Range    Magnesium 2.2 1.6 - 2.6 mg/dL   Basic metabolic panel   Result Value Ref Range    Sodium 138 136 - 145 mmol/L    Potassium 3.8 3.5 - 5.1 mmol/L    Chloride 95 95 - 110 mmol/L    CO2 32 (H) 23 - 29 mmol/L    Glucose 93 70 - 110 mg/dL    BUN 14 8 - 23  mg/dL    Creatinine 0.9 0.5 - 1.4 mg/dL    Calcium 9.2 8.7 - 10.5 mg/dL    Anion Gap 11 8 - 16 mmol/L    eGFR if African American >60 >60 mL/min/1.73 m^2    eGFR if non African American >60 >60 mL/min/1.73 m^2   Echo   Result Value Ref Range    BSA 1.99 m2    TDI SEPTAL 0.08 m/s    LV LATERAL E/E' RATIO 7.00 m/s    LV SEPTAL E/E' RATIO 10.50 m/s    LA WIDTH 3.93 cm    AORTIC VALVE CUSP SEPERATION 1.89 cm    TDI LATERAL 0.12 m/s    PV PEAK VELOCITY 1.00 cm/s    LVIDd 4.81 3.5 - 6.0 cm    IVS 0.89 0.6 - 1.1 cm    Posterior Wall 1.08 0.6 - 1.1 cm    Ao root annulus 2.65 cm    LVIDs 2.62 2.1 - 4.0 cm    FS 46 28 - 44 %    LA volume 61.93 cm3    LV mass 167.32 g    LA size 3.48 cm    RVDD 2.24 cm    Left Ventricle Relative Wall Thickness 0.45 cm    AV mean gradient 6 mmHg    AV valve area 2.22 cm2    AV Velocity Ratio 0.78     AV index (prosthetic) 0.69     MV mean gradient 1 mmHg    MV valve area p 1/2 method 3.09 cm2    MV valve area by continuity eq 2.19 cm2    E/A ratio 1.04     Mean e' 0.10 m/s    E wave deceleration time 245.50 msec    IVRT 65.65 msec    LVOT diameter 2.03 cm    LVOT area 3.2 cm2    LVOT peak jorge 1.27 m/s    LVOT peak VTI 26.40 cm    Ao peak jorge 1.63 m/s    Ao VTI 38.44 cm    LVOT stroke volume 85.40 cm3    AV peak gradient 11 mmHg    MV peak gradient 4 mmHg    E/E' ratio 8.40 m/s    MV Peak E Jorge 0.84 m/s    TR Max Jorge 2.63 m/s    MV VTI 38.95 cm    MV stenosis pressure 1/2 time 71.20 ms    MV Peak A Jorge 0.81 m/s    LV Systolic Volume 25.10 mL    LV Systolic Volume Index 13.0 mL/m2    LV Diastolic Volume 108.03 mL    LV Diastolic Volume Index 55.97 mL/m2    LA Volume Index 32.1 mL/m2    LV Mass Index 87 g/m2    RA Major Axis 5.46 cm    Left Atrium Minor Axis 4.89 cm    Left Atrium Major Axis 5.85 cm    Triscuspid Valve Regurgitation Peak Gradient 28 mmHg    LA Volume Index (Mod) 30.9 mL/m2    LA volume (mod) 59.73 cm3    RA Width 3.21 cm    EF 60 %    Right Atrial Pressure (from IVC) 3 mmHg    TV  rest pulmonary artery pressure 31 mmHg   POCT glucose   Result Value Ref Range    POCT Glucose 88 70 - 110 mg/dL     *Note: Due to a large number of results and/or encounters for the requested time period, some results have not been displayed. A complete set of results can be found in Results Review.         Current Outpatient Medications:     aspirin (ECOTRIN) 81 MG EC tablet, Take 1 tablet (81 mg total) by mouth once daily., Disp: 30 tablet, Rfl: 0    atorvastatin (LIPITOR) 80 MG tablet, TAKE ONE TABLET BY MOUTH EVERY DAY (Patient taking differently: Take by mouth every evening.), Disp: 90 tablet, Rfl: 3    FLUoxetine 20 MG capsule, Take 3 capsules (60 mg total) by mouth once daily., Disp: 270 capsule, Rfl: 1    furosemide (LASIX) 40 MG tablet, Take 1 tablet (40 mg total) by mouth once daily., Disp: 30 tablet, Rfl: 0    HYDROcodone-acetaminophen (NORCO)  mg per tablet, Take by mouth every 24 hours as needed., Disp: , Rfl:     levothyroxine (SYNTHROID) 137 MCG Tab tablet, Take 1 tablet (137 mcg total) by mouth before breakfast., Disp: 30 tablet, Rfl: 0    lidocaine (LIDODERM) 5 %, daily as needed. , Disp: , Rfl:     nitroGLYCERIN (NITROSTAT) 0.4 MG SL tablet, Place 1 tablet (0.4 mg total) under the tongue every 5 (five) minutes as needed for Chest pain., Disp: 25 tablet, Rfl: 3    ondansetron (ZOFRAN-ODT) 4 MG TbDL, Take by mouth., Disp: , Rfl:     pantoprazole (PROTONIX) 40 MG tablet, TAKE ONE TABLET BY MOUTH EVERY DAY (Patient taking differently: Take by mouth every morning.), Disp: 90 tablet, Rfl: 3    potassium chloride SA (K-DUR,KLOR-CON) 20 MEQ tablet, Take 1 tablet (20 mEq total) by mouth 2 (two) times daily., Disp: 180 tablet, Rfl: 3    promethazine (PHENERGAN) 25 MG tablet, Take 25 mg by mouth every 6 (six) hours as needed for Nausea., Disp: , Rfl:     QUEtiapine (SEROQUEL) 100 MG Tab, TAKE 2 TABLETS (200 MG) BY MOUTH NIGHTLY, Disp: 180 tablet, Rfl: 3    tiZANidine (ZANAFLEX) 4 MG  tablet, Take 4 mg by mouth 2 (two) times daily., Disp: , Rfl:     traZODone (DESYREL) 300 MG tablet, Take 1 tablet (300 mg total) by mouth every evening., Disp: 30 tablet, Rfl: 0    acyclovir 5% (ZOVIRAX) 5 % ointment, Apply topically 5 (five) times daily., Disp: 15 g, Rfl: 0    butalbital-acetaminophen-caffeine -40 mg (FIORICET, ESGIC) -40 mg per tablet, Take 1 tablet by mouth daily as needed., Disp: 15 tablet, Rfl: 0    intrathecal pain pump compound, Hydromorphone (7.5 mg/mL) infusion at 3.6799 mg/day (0.1533 mg/hr) out of a total reservoir volume of 37.3 mL Pump filled every 2 months, Disp: , Rfl:     senna (SENNA LAX) 8.6 mg tablet, Take 2 tablets by mouth 2 (two) times daily., Disp: , Rfl:      Lab Results   Component Value Date    WBC 3.74 (L) 07/30/2022    RBC 3.82 (L) 07/30/2022    HGB 10.7 (L) 07/30/2022    HCT 34.3 (L) 07/30/2022    MCV 90 07/30/2022    MCH 28.0 07/30/2022    MCHC 31.2 (L) 07/30/2022    RDW 13.6 07/30/2022     07/30/2022    MPV 9.9 07/30/2022    GRAN 1.7 (L) 07/30/2022    GRAN 45.9 07/30/2022    LYMPH 1.3 07/30/2022    LYMPH 35.6 07/30/2022    MONO 0.3 07/30/2022    MONO 9.1 07/30/2022    EOS 0.3 07/30/2022    BASO 0.02 07/30/2022    EOSINOPHIL 8.6 (H) 07/30/2022    BASOPHIL 0.5 07/30/2022    MG 2.2 07/30/2022        CMP  Lab Results   Component Value Date     07/31/2022    K 3.8 07/31/2022    CL 95 07/31/2022    CO2 32 (H) 07/31/2022    GLU 93 07/31/2022    BUN 14 07/31/2022    CREATININE 0.9 07/31/2022    CALCIUM 9.2 07/31/2022    PROT 6.8 07/27/2022    ALBUMIN 3.3 (L) 07/27/2022    BILITOT 0.3 07/27/2022    ALKPHOS 96 07/27/2022    AST 9 (L) 07/27/2022    ALT <5 (L) 07/27/2022    ANIONGAP 11 07/31/2022    ESTGFRAFRICA >60 07/31/2022    EGFRNONAA >60 07/31/2022        Lab Results   Component Value Date    LABBLOO No growth after 5 days. 02/24/2022    LABURIN ESCHERICHIA COLI ESBL  >100,000 cfu/ml   (A) 07/07/2022    RESPIRATORYC Normal respiratory french  06/03/2021    GSRESP <10 epithelial cells per low power field. 06/03/2021    GSRESP Rare WBC's 06/03/2021    GSRESP No organisms seen 06/03/2021            Results for orders placed or performed during the hospital encounter of 07/27/22   EKG 12-lead    Collection Time: 07/27/22  9:48 PM    Narrative    Test Reason : R06.02,    Vent. Rate : 061 BPM     Atrial Rate : 061 BPM     P-R Int : 184 ms          QRS Dur : 106 ms      QT Int : 472 ms       P-R-T Axes : 046 -05 010 degrees     QTc Int : 475 ms    Normal sinus rhythm  Cannot rule out Anterior infarct (cited on or before 25-JUL-2022)  Abnormal ECG  When compared with ECG of 25-JUL-2022 02:29,  No significant change was found  Confirmed by Saima Blanc MD ([id number]) on 7/28/2022 8:47:29 AM    Referred By: GIANNAERR   SELF           Confirmed By:Saima Blanc MD                  Plan:       Problem List Items Addressed This Visit        Cardiac/Vascular    Thoracic aorta atherosclerosis     Noted on imaging studies.  Condition stable.           Coronary artery disease of native artery of native heart with stable angina pectoris     In 2016 confirmation of 100% occluded LAD with right-to-left collaterals confirmed.  She is essentially wheelchair bound.  No symptoms of angina elicited.           Pure hypercholesterolemia     Atorvastatin tolerated well.  It will be continued at 80 mg daily.  Her LDL is controlled well.           Chronic diastolic heart failure     A recent echo June 2022 confirmed normal ejection fraction without pulmonary hypertension.  She has dyspnea and chronic leg swelling.  Furosemide will be continued.              Endocrine    Class 1 obesity due to excess calories in adult     Weight loss encouraged.              Other    Preoperative clearance     I am clearing the patient for her upcoming foot surgery with her podiatrist who works near [facility].  She had a negative stress test June 2021. There is a history of coronary disease.                Other Visit Diagnoses     Mixed hyperlipidemia                 I have cleared the patient for her upcoming surgery.  She will continue furosemide.  I suspect she does have venous insufficiency.      I will see her back in 4 months given history of coronary artery disease and chronic diastolic heart failure..             Brent Ceballos MD  08/11/2022   2:18 PM

## 2022-08-11 VITALS
SYSTOLIC BLOOD PRESSURE: 94 MMHG | BODY MASS INDEX: 30.86 KG/M2 | HEART RATE: 65 BPM | OXYGEN SATURATION: 91 % | HEIGHT: 64 IN | DIASTOLIC BLOOD PRESSURE: 51 MMHG | WEIGHT: 180.75 LBS

## 2022-08-11 PROBLEM — Z01.818 PREOPERATIVE CLEARANCE: Status: ACTIVE | Noted: 2022-08-11

## 2022-08-11 PROBLEM — I25.5 ISCHEMIC CARDIOMYOPATHY: Chronic | Status: RESOLVED | Noted: 2018-03-08 | Resolved: 2022-08-11

## 2022-08-11 NOTE — ASSESSMENT & PLAN NOTE
A recent echo June 2022 confirmed normal ejection fraction without pulmonary hypertension.  She has dyspnea and chronic leg swelling.  Furosemide will be continued.

## 2022-08-11 NOTE — ASSESSMENT & PLAN NOTE
I am clearing the patient for her upcoming foot surgery with her podiatrist who works near [facility].  She had a negative stress test June 2021. There is a history of coronary disease.

## 2022-08-11 NOTE — ASSESSMENT & PLAN NOTE
In 2016 confirmation of 100% occluded LAD with right-to-left collaterals confirmed.  She is essentially wheelchair bound.  No symptoms of angina elicited.

## 2022-08-12 ENCOUNTER — TELEPHONE (OUTPATIENT)
Dept: CARDIOLOGY | Facility: CLINIC | Age: 67
End: 2022-08-12
Payer: MEDICARE

## 2022-08-12 NOTE — ED NOTES
Problem: Adult Behavioral Health Plan of Care  Goal: Patient-Specific Goal (Individualization)  Outcome: Ongoing, Progressing  Flowsheets  Taken 8/10/2022 0953 by Doretha Bassett  Patient-Specific Goals (Include Timeframe): Patient will deny SI/HI prior to discharge. Patient will identify 1 healthy coping skill to prevent relapse prior to discharge. Patient will consent to appropriate aftercare plan prior to discharge.  Individualized Care Needs: Therapist will offer 1-4 individual sessions, family education, aftercare planning  Anxieties, Fears or Concerns: None  Taken 8/10/2022 0944 by Doretha Bassett  Patient Personal Strengths:   expressive of emotions   expressive of needs   family/social support   intellectual cognitive skills   parenting skills   positive educational history   realistic evaluation of current/future capabilities   resilient   resourceful   motivated for recovery   motivated for treatment   spiritual/Hoahaoism support   stable living environment  Patient Vulnerabilities:   substance abuse/addiction   traumatic event     Problem: Adult Behavioral Health Plan of Care  Goal: Optimized Coping Skills in Response to Life Stressors  Outcome: Ongoing, Progressing  Flowsheets (Taken 8/12/2022 1432)  Optimized Coping Skills in Response to Life Stressors: making progress toward outcome     Problem: Adult Behavioral Health Plan of Care  Goal: Optimized Coping Skills in Response to Life Stressors  Intervention: Promote Effective Coping Strategies  Flowsheets (Taken 8/12/2022 1432)  Supportive Measures:   active listening utilized   positive reinforcement provided   self-responsibility promoted   counseling provided   problem-solving facilitated   verbalization of feelings encouraged   decision-making supported   goal-setting facilitated   self-care encouraged   self-reflection promoted     Problem: Adult Behavioral Health Plan of Care  Goal: Develops/Participates in Therapeutic  CT TECH TO ROOM   Only to Support Successful Transition  Outcome: Ongoing, Progressing  Flowsheets (Taken 8/12/2022 1432)  Develops/Participates in Therapeutic Only to Support Successful Transition: making progress toward outcome     Problem: Adult Behavioral Health Plan of Care  Goal: Develops/Participates in Therapeutic Only to Support Successful Transition  Intervention: Foster Therapeutic Only  Flowsheets (Taken 8/12/2022 1432)  Trust Relationship/Rapport:   care explained   reassurance provided   choices provided   thoughts/feelings acknowledged   emotional support provided   empathic listening provided   questions answered   questions encouraged     Problem: Adult Behavioral Health Plan of Care  Goal: Develops/Participates in Therapeutic Only to Support Successful Transition  Intervention: Mutually Develop Transition Plan  Flowsheets  Taken 8/12/2022 1432  Transition Support:   community resources reviewed   crisis management plan promoted   crisis management plan verbalized   follow-up care coordinated   follow-up care discussed  Taken 8/12/2022 1430  Transportation Anticipated: family or friend will provide  Transportation Concerns: no car  Current Discharge Risk:   substance use/abuse   psychiatric illness  Concerns to be Addressed:   coping/stress   cognitive/perceptual   grief and loss   substance/tobacco abuse/use   medication   mental health  Readmission Within the Last 30 Days: no previous admission in last 30 days  Patient/Family Anticipated Services at Transition: rehabilitation services  Patient/Family Anticipates Transition to: inpatient rehabilitation facility  Offered/Gave Vendor List: no  Data:  Therapist reviewed Dr. Vargas's assessment, discussed patient with nursing staff and met with patient this date to further discuss patient progress, review healthy coping and safe disposition.      Clinical Maneuvering/Intervention:    Therapist assisted patient in processing above session content;  acknowledged and normalized patient's thoughts, feelings and concerns.  Encouraged patient to discuss/vent feelings, frustrations, and fears concerning their ongoing issues and validated patients feelings.  Discussed the importance of healthy coping and reviewed healthy coping skills such as distraction, thought reframing/redirecting, grounding, mindfulness, etc.  Reviewed safe disposition with patient.    Assessment:  Patient denies suicidal ideation/homicidal ideation.  Patient reports ongoing depression and anxiety today.  Patient states he is feeling better.  He reports that the hallucinations have decreased.  He discussed that he had a vivid dream last night.  He discussed that he has been engaging in reading today for some healthy coping.  He discussed that he did read in the past but had stopped over the last few years.  He discussed that he thinks the plan for him for discharge is on Sunday and reports he can go to his mother's home until he can go to [facility] as his mother's home would be more of a safe/sober environment for him.    Plan:  Patient will continue hospitalization/medication management. Patient plans to attend [facility] possibly on Monday if they can admit him that day.  If he discharges Sunday he plans to go to his mother's home to await [facility] admission.

## 2022-08-12 NOTE — TELEPHONE ENCOUNTER
----- Message from Brent Ceballos MD sent at 8/11/2022  2:06 PM CDT -----  This is the other Preoperative from yesterday that needs a clearance consult forwarded to the podiatrist in the [facility] area.  Please work with the patient.  I think her surgery is next week as well.

## 2022-08-15 ENCOUNTER — TELEPHONE (OUTPATIENT)
Dept: INTERNAL MEDICINE | Facility: CLINIC | Age: 67
End: 2022-08-15
Payer: MEDICARE

## 2022-08-15 DIAGNOSIS — I89.0 LYMPHEDEMA OF BOTH LOWER EXTREMITIES: Primary | ICD-10-CM

## 2022-08-15 NOTE — TELEPHONE ENCOUNTER
----- Message from Christina Pillai sent at 8/15/2022  3:00 PM CDT -----  Regarding: surgical clearanc  Contact: patient [phone]  Patient is requesting an EKG to be scheduled for feet surgery. Patient would like the order before Friday.  Please call and advise.

## 2022-08-16 ENCOUNTER — TELEPHONE (OUTPATIENT)
Dept: INTERNAL MEDICINE | Facility: CLINIC | Age: 67
End: 2022-08-16
Payer: MEDICARE

## 2022-08-16 ENCOUNTER — TELEPHONE (OUTPATIENT)
Dept: UROLOGY | Facility: CLINIC | Age: 67
End: 2022-08-16
Payer: MEDICARE

## 2022-08-16 DIAGNOSIS — U07.1 COVID: ICD-10-CM

## 2022-08-16 DIAGNOSIS — U07.1 COVID-19: Primary | ICD-10-CM

## 2022-08-16 NOTE — TELEPHONE ENCOUNTER
----- Message from Joseph Gentile sent at 8/16/2022  2:44 PM CDT -----  Contact: Tasha with [facility] [place] [phone]  Tasha called in she id with pt now she is experiencing cough congestion she gave her a covid home test and it came back positive please advise

## 2022-08-16 NOTE — TELEPHONE ENCOUNTER
She meets criteria for paxlovid, but is on a long list of medications.  I'll order the antibody infusion for her.    She should isolate for 5 days after the positive test, so she should NOT come for a clinic visit during the isolation time.    Please call and inform the patient.

## 2022-08-16 NOTE — TELEPHONE ENCOUNTER
----- Message from Feng Magdaleno sent at 8/16/2022  8:52 AM CDT -----  Contact: self [phone]  Pt requesting an order for Ekg stated need prior to next week surgery.    Please call and advise

## 2022-08-16 NOTE — TELEPHONE ENCOUNTER
I spoke to Nicolette ([facility] Nurse) [phone], she states pt started having symptoms about a week ago, denies fever (97.3). She reports that pt was given a home Covid test that resulted positive today. Pt symptoms are a wet cough with green mucus and very fatigued all day. Please advise.

## 2022-08-16 NOTE — TELEPHONE ENCOUNTER
----- Message from Charla Clark sent at 8/16/2022 11:10 AM CDT -----  Contact: [phone]  Pt wants a call back about her EKG appt.

## 2022-08-16 NOTE — TELEPHONE ENCOUNTER
----- Message from Barbara Smith RN sent at 8/15/2022  5:10 PM CDT -----  Contact: @[phone] or [phone]    ----- Message -----  From: Philly Ross  Sent: 8/15/2022   4:10 PM CDT  To: [facility] Staff    Pt is calling stating that she needs to speak to Dr. Mayo she is having some pressure below and burning, please call to discuss further. Pt states that her urine is orange and smelly.  Pt states that her tubes were changed today.

## 2022-08-16 NOTE — TELEPHONE ENCOUNTER
Spoke with Rigoberto mike/ [facility] at [phone]. Asked if pt still has standing order for HH nurse to assess pt for s/s of UTI. States pt does have standing order. Notified of pt complaints, asked the urine be collected from new catheter, not bag, since it was changed on yesterday (8/15/22). States she will have nurse go out today but if not the latest at tomorrow (8/17/22). Advised if after 8/17/22, pt will need to have catheter changed again for urine collection directly from new catheter, expressed understanding.

## 2022-08-17 ENCOUNTER — INFUSION (OUTPATIENT)
Dept: INFECTIOUS DISEASES | Facility: HOSPITAL | Age: 67
End: 2022-08-17
Attending: INTERNAL MEDICINE
Payer: MEDICARE

## 2022-08-17 ENCOUNTER — TELEPHONE (OUTPATIENT)
Dept: INFECTIOUS DISEASES | Facility: HOSPITAL | Age: 67
End: 2022-08-17
Payer: MEDICARE

## 2022-08-17 VITALS
SYSTOLIC BLOOD PRESSURE: 140 MMHG | HEART RATE: 78 BPM | OXYGEN SATURATION: 94 % | HEIGHT: 64 IN | RESPIRATION RATE: 16 BRPM | WEIGHT: 182 LBS | BODY MASS INDEX: 31.07 KG/M2 | DIASTOLIC BLOOD PRESSURE: 65 MMHG | TEMPERATURE: 99 F

## 2022-08-17 DIAGNOSIS — U07.1 COVID-19: Primary | ICD-10-CM

## 2022-08-17 PROCEDURE — M0222 HC IV INJECTION, BEBTELOVIMAB, INCL POST ADMIN MONIT: HCPCS | Performed by: INTERNAL MEDICINE

## 2022-08-17 PROCEDURE — 63600175 PHARM REV CODE 636 W HCPCS: Performed by: INTERNAL MEDICINE

## 2022-08-17 RX ORDER — ONDANSETRON 4 MG/1
4 TABLET, ORALLY DISINTEGRATING ORAL
Status: ACTIVE | OUTPATIENT
Start: 2022-08-17 | End: 2022-08-18

## 2022-08-17 RX ORDER — EPINEPHRINE 0.3 MG/.3ML
0.3 INJECTION SUBCUTANEOUS
Status: ACTIVE | OUTPATIENT
Start: 2022-08-17 | End: 2022-08-20

## 2022-08-17 RX ORDER — ALBUTEROL SULFATE 90 UG/1
2 AEROSOL, METERED RESPIRATORY (INHALATION)
Status: ACTIVE | OUTPATIENT
Start: 2022-08-17 | End: 2022-08-20

## 2022-08-17 RX ORDER — BEBTELOVIMAB 87.5 MG/ML
175 INJECTION, SOLUTION INTRAVENOUS
Status: COMPLETED | OUTPATIENT
Start: 2022-08-17 | End: 2022-08-17

## 2022-08-17 RX ADMIN — BEBTELOVIMAB 175 MG: 87.5 INJECTION, SOLUTION INTRAVENOUS at 01:08

## 2022-08-17 NOTE — PROGRESS NOTES
1330 Bebtelovimab IV Injection administered. Pt tolerated injection well. No S/S of injection reaction noted at present time. One hour observation started.

## 2022-08-17 NOTE — PROGRESS NOTES
Patient remains with no signs of complications noted. Patient received Bebtelovimab IV Injection according to FDA recommendations and [facility] SOP without complications noted and left with mask in place. Drug fact sheet provided. Pt discharged home. Ambulatory. Remains AAox3. No distress noted. RR even and unlabored.

## 2022-08-17 NOTE — PROGRESS NOTES
Patient arrives for Bebtelovimab IV Injection. Ambulatory. Pt AAox3. No distress noted. RR even and unlabored.     Symptoms and onset date:  08/15/22 cough     Tested COVID + on 08/16/22

## 2022-08-22 ENCOUNTER — HOSPITAL ENCOUNTER (EMERGENCY)
Facility: HOSPITAL | Age: 67
Discharge: HOME | End: 2022-08-23
Attending: EMERGENCY MEDICINE
Payer: MEDICARE

## 2022-08-22 DIAGNOSIS — G89.29 ACUTE EXACERBATION OF CHRONIC LOW BACK PAIN: Primary | ICD-10-CM

## 2022-08-22 DIAGNOSIS — M54.31 BILATERAL SCIATICA: ICD-10-CM

## 2022-08-22 DIAGNOSIS — M54.50 ACUTE EXACERBATION OF CHRONIC LOW BACK PAIN: Primary | ICD-10-CM

## 2022-08-22 DIAGNOSIS — M54.32 BILATERAL SCIATICA: ICD-10-CM

## 2022-08-22 LAB
ALBUMIN SERPL BCP-MCNC: 3.2 G/DL (ref 3.5–5.2)
ALP SERPL-CCNC: 100 U/L (ref 55–135)
ALT SERPL W/O P-5'-P-CCNC: 9 U/L (ref 10–44)
ANION GAP SERPL CALC-SCNC: 11 MMOL/L (ref 8–16)
AST SERPL-CCNC: 12 U/L (ref 10–40)
BASOPHILS # BLD AUTO: 0.01 K/UL (ref 0–0.2)
BASOPHILS NFR BLD: 0.1 % (ref 0–1.9)
BILIRUB SERPL-MCNC: 0.3 MG/DL (ref 0.1–1)
BUN SERPL-MCNC: 11 MG/DL (ref 8–23)
CALCIUM SERPL-MCNC: 9 MG/DL (ref 8.7–10.5)
CHLORIDE SERPL-SCNC: 101 MMOL/L (ref 95–110)
CO2 SERPL-SCNC: 28 MMOL/L (ref 23–29)
CREAT SERPL-MCNC: 0.8 MG/DL (ref 0.5–1.4)
DIFFERENTIAL METHOD: ABNORMAL
EOSINOPHIL # BLD AUTO: 0.3 K/UL (ref 0–0.5)
EOSINOPHIL NFR BLD: 3.9 % (ref 0–8)
ERYTHROCYTE [DISTWIDTH] IN BLOOD BY AUTOMATED COUNT: 13.7 % (ref 11.5–14.5)
EST. GFR  (NO RACE VARIABLE): >60 ML/MIN/1.73 M^2
GLUCOSE SERPL-MCNC: 88 MG/DL (ref 70–110)
HCT VFR BLD AUTO: 31.7 % (ref 37–48.5)
HGB BLD-MCNC: 9.6 G/DL (ref 12–16)
IMM GRANULOCYTES # BLD AUTO: 0.02 K/UL (ref 0–0.04)
IMM GRANULOCYTES NFR BLD AUTO: 0.3 % (ref 0–0.5)
LYMPHOCYTES # BLD AUTO: 1.2 K/UL (ref 1–4.8)
LYMPHOCYTES NFR BLD: 16.5 % (ref 18–48)
MCH RBC QN AUTO: 26.4 PG (ref 27–31)
MCHC RBC AUTO-ENTMCNC: 30.3 G/DL (ref 32–36)
MCV RBC AUTO: 87 FL (ref 82–98)
MONOCYTES # BLD AUTO: 0.6 K/UL (ref 0.3–1)
MONOCYTES NFR BLD: 8.4 % (ref 4–15)
NEUTROPHILS # BLD AUTO: 5.2 K/UL (ref 1.8–7.7)
NEUTROPHILS NFR BLD: 70.8 % (ref 38–73)
NRBC BLD-RTO: 0 /100 WBC
PLATELET # BLD AUTO: 242 K/UL (ref 150–450)
PMV BLD AUTO: 9.6 FL (ref 9.2–12.9)
POTASSIUM SERPL-SCNC: 4 MMOL/L (ref 3.5–5.1)
PROT SERPL-MCNC: 6.8 G/DL (ref 6–8.4)
RBC # BLD AUTO: 3.64 M/UL (ref 4–5.4)
SODIUM SERPL-SCNC: 140 MMOL/L (ref 136–145)
WBC # BLD AUTO: 7.39 K/UL (ref 3.9–12.7)

## 2022-08-22 PROCEDURE — 63600175 PHARM REV CODE 636 W HCPCS: Performed by: EMERGENCY MEDICINE

## 2022-08-22 PROCEDURE — 25000003 PHARM REV CODE 250: Performed by: EMERGENCY MEDICINE

## 2022-08-22 PROCEDURE — 96361 HYDRATE IV INFUSION ADD-ON: CPT

## 2022-08-22 PROCEDURE — 96375 TX/PRO/DX INJ NEW DRUG ADDON: CPT

## 2022-08-22 PROCEDURE — 85025 COMPLETE CBC W/AUTO DIFF WBC: CPT | Performed by: EMERGENCY MEDICINE

## 2022-08-22 PROCEDURE — 96374 THER/PROPH/DIAG INJ IV PUSH: CPT

## 2022-08-22 PROCEDURE — 99285 EMERGENCY DEPT VISIT HI MDM: CPT | Mod: 25

## 2022-08-22 PROCEDURE — 96372 THER/PROPH/DIAG INJ SC/IM: CPT | Performed by: EMERGENCY MEDICINE

## 2022-08-22 PROCEDURE — 80053 COMPREHEN METABOLIC PANEL: CPT | Performed by: EMERGENCY MEDICINE

## 2022-08-22 RX ORDER — ONDANSETRON 2 MG/ML
4 INJECTION INTRAMUSCULAR; INTRAVENOUS
Status: COMPLETED | OUTPATIENT
Start: 2022-08-22 | End: 2022-08-22

## 2022-08-22 RX ORDER — HYDROMORPHONE HYDROCHLORIDE 2 MG/ML
2 INJECTION, SOLUTION INTRAMUSCULAR; INTRAVENOUS; SUBCUTANEOUS
Status: COMPLETED | OUTPATIENT
Start: 2022-08-23 | End: 2022-08-23

## 2022-08-22 RX ORDER — HYDROMORPHONE HYDROCHLORIDE 1 MG/ML
1 INJECTION, SOLUTION INTRAMUSCULAR; INTRAVENOUS; SUBCUTANEOUS
Status: COMPLETED | OUTPATIENT
Start: 2022-08-22 | End: 2022-08-22

## 2022-08-22 RX ORDER — LIDOCAINE 50 MG/G
1 PATCH TOPICAL
Status: DISCONTINUED | OUTPATIENT
Start: 2022-08-22 | End: 2022-08-23 | Stop reason: HOSPADM

## 2022-08-22 RX ADMIN — SODIUM CHLORIDE, SODIUM LACTATE, POTASSIUM CHLORIDE, AND CALCIUM CHLORIDE 1000 ML: .6; .31; .03; .02 INJECTION, SOLUTION INTRAVENOUS at 11:08

## 2022-08-22 RX ADMIN — HYDROMORPHONE HYDROCHLORIDE 1 MG: 1 INJECTION, SOLUTION INTRAMUSCULAR; INTRAVENOUS; SUBCUTANEOUS at 11:08

## 2022-08-22 RX ADMIN — ONDANSETRON 4 MG: 2 INJECTION INTRAMUSCULAR; INTRAVENOUS at 11:08

## 2022-08-22 RX ADMIN — LIDOCAINE 1 PATCH: 50 PATCH CUTANEOUS at 11:08

## 2022-08-23 ENCOUNTER — TELEPHONE (OUTPATIENT)
Dept: UROLOGY | Facility: CLINIC | Age: 67
End: 2022-08-23
Payer: MEDICARE

## 2022-08-23 VITALS
TEMPERATURE: 98 F | SYSTOLIC BLOOD PRESSURE: 110 MMHG | OXYGEN SATURATION: 98 % | HEART RATE: 60 BPM | RESPIRATION RATE: 16 BRPM | DIASTOLIC BLOOD PRESSURE: 60 MMHG

## 2022-08-23 DIAGNOSIS — R82.79 POSITIVE URINE CULTURE: Primary | ICD-10-CM

## 2022-08-23 PROCEDURE — 63600175 PHARM REV CODE 636 W HCPCS: Performed by: EMERGENCY MEDICINE

## 2022-08-23 RX ADMIN — HYDROMORPHONE HYDROCHLORIDE 2 MG: 2 INJECTION, SOLUTION INTRAMUSCULAR; INTRAVENOUS; SUBCUTANEOUS at 12:08

## 2022-08-23 NOTE — ED PROVIDER NOTES
Encounter Date: 8/22/2022       History     Chief Complaint   Patient presents with    Back Pain     Pt presents to ED with complaints of sharp back pain that radiates down her legs. Pt reports pain began last night. Pt reports difficulty to walk, states she cannot lift her legs. Pt reports she usually walks with her walker, but cannot walk. Pt set to have foot surgery. Pt reports numbness between her legs.      The patient is a 66-year-old who has a history of carotid artery occlusion, cataracts, congestive heart failure, coronary artery disease, depression, hypothyroidism, iron deficiency anemia, lumbar radiculopathy, ocular migraine, renal disorder, and sleep apnea who presents with several days acute worsening of her chronic low back pain with radiation into both legs diffusely.  She states that she lost her balance while walking to check her male within the last week and landed on her back.  She has an intra-abdominal pain pump that she reports supplies Dilaudid. Her pain has become so severe over the last day that she is unable to stand or walk. She denies fever, chills, chest pain, shortness of breath, difficulty urinating, dysuria, headache, and dizziness.    She has a past medical history of Anticoagulant long-term use, Anxiety, Arthritis, Bilateral lower extremity edema, Carotid artery occlusion, Cataract, CHF (congestive heart failure), Coronary artery disease, Depression, Fever blister, Hypothyroid, Iron deficiency anemia, Lumbar radiculopathy, Ocular migraine, Renal disorder, and Sleep apnea.    The history is provided by the patient. No  was used.     Review of patient's allergies indicates:   Allergen Reactions    (d)-limonene flavor      Other reaction(s): difficult intubation  Other reaction(s): Difficulty breathing    Bactrim [sulfamethoxazole-trimethoprim] Anaphylaxis    Benadryl [diphenhydramine hcl] Shortness Of Breath    Fentanyl Itching, Nausea And Vomiting and  Swelling             Imitrex [sumatriptan succinate] Shortness Of Breath    Percocet [oxycodone-acetaminophen] Shortness Of Breath    Topamax [topiramate] Shortness Of Breath    Vancomycin Shortness Of Breath     Rash    Butorphanol tartrate     Darvocet a500 [propoxyphene n-acetaminophen]      Other reaction(s): Difficulty breathing    White petrolatum-zinc     Zinc oxide-white petrolatum      Other reaction(s): Difficulty breathing    Latex, natural rubber Itching and Rash    Phenytoin Rash and Other (See Comments)     Trouble breathing     Past Medical History:   Diagnosis Date    Anticoagulant long-term use     Anxiety     Arthritis     Bilateral lower extremity edema     severe chronic    Carotid artery occlusion     Cataract     CHF (congestive heart failure)     Coronary artery disease     subtotalled LAD with collateral    Depression     Fever blister     Hypothyroid     Iron deficiency anemia     Lumbar radiculopathy     with chronic pain    Ocular migraine     Renal disorder     Sleep apnea     cpap     Past Surgical History:   Procedure Laterality Date    ADENOIDECTOMY      BACK SURGERY      x 12    CARDIAC CATHETERIZATION  2016    subtotalled LAD with right to left collaterals    CATARACT EXTRACTION W/  INTRAOCULAR LENS IMPLANT Left     Dr Coleman     CYSTOSCOPIC LITHOLAPAXY N/A 6/27/2019    Procedure: CYSTOLITHOLAPAXY;  Surgeon: Shireen Mayo MD;  Location: [facility] OR [address] [place] [address];  Service: Urology;  Laterality: N/A;    CYSTOSCOPIC LITHOLAPAXY N/A 9/3/2019    Procedure: CYSTOLITHOLAPAXY;  Surgeon: Shireen Mayo MD;  Location: [facility] OR 2ND FLR;  Service: Urology;  Laterality: N/A;    CYSTOSCOPY N/A 7/13/2021    Procedure: CYSTOSCOPY;  Surgeon: Shireen Mayo MD;  Location: [facility] OR 1ST FLR;  Service: Urology;  Laterality: N/A;    CYSTOSCOPY  11/16/2021    Procedure: CYSTOSCOPY;  Surgeon: Shrieen Mayo MD;  [facility];  Service: Urology;;     CYSTOSCOPY  7/19/2022    Procedure: CYSTOSCOPY;  Surgeon: Shireen Mayo MD;  [facility];  Service: Urology;;    CYSTOSCOPY WITH INJECTION OF PERIURETHRAL BULKING AGENT  7/19/2022    Procedure: CYSTOSCOPY, WITH PERIURETHRAL BULKING AGENT INJECTION-MACROPLASTIQUE;  Surgeon: Shireen Mayo MD;  [facility];  Service: Urology;;    ESOPHAGOGASTRODUODENOSCOPY N/A 5/23/2018    Procedure: ESOPHAGOGASTRODUODENOSCOPY (EGD);  Surgeon: Prince Vance MD;  Location: [facility] (4TH FLR);  Service: Endoscopy;  Laterality: N/A;  r/s 'd per Dr. Vance due to family emergency- ER    HYSTERECTOMY  1975    endometriosis    INJECTION OF BOTULINUM TOXIN TYPE A  7/13/2021    Procedure: INJECTION, BOTULINUM TOXIN, 200units;  Surgeon: Shireen Mayo MD;  [facility];  Service: Urology;;    INJECTION OF BOTULINUM TOXIN TYPE A  11/16/2021    Procedure: INJECTION, BOTULINUM TOXIN, 200units;  Surgeon: Shireen Mayo MD;  [facility];  Service: Urology;;    INJECTION OF BOTULINUM TOXIN TYPE A  7/19/2022    Procedure: INJECTION, BOTULINUM TOXIN, 300 units ;  Surgeon: Shireen Mayo MD;  [facility];  Service: Urology;;    pain pump placement      SQ Dilaudid Pump managed by Dr. Hillman, Pain Management    REPLACEMENT OF CATHETER N/A 10/31/2019    Procedure: REPLACEMENT, CATHETER-SUPRAPUBIC;  Surgeon: Shireen Mayo MD;  [facility];  Service: Urology;  Laterality: N/A;    SPINAL CORD STIMULATOR REMOVAL      before Larissa    SPINE SURGERY  5-13-13    CERVICAL FUSION    TONSILLECTOMY       Family History   Problem Relation Age of Onset    Cancer Mother 55        breast    Cancer Father         esophagus,had laryngectomy    Esophageal cancer Father     Parkinsonism Maternal Grandmother     Tremor Maternal Grandmother     No Known Problems Brother     No Known Problems Brother     Heart disease Maternal Uncle     Colon  cancer Maternal Uncle         Less than 60    No Known Problems Sister     No Known Problems Maternal Aunt     Cirrhosis Paternal Aunt         ETOH    Liver disease Paternal Aunt         ETOH    Liver disease Paternal Uncle         ETOH    Cirrhosis Paternal Uncle         ETOH    No Known Problems Maternal Grandfather     No Known Problems Paternal Grandmother     No Known Problems Paternal Grandfather     Melanoma Neg Hx     Amblyopia Neg Hx     Blindness Neg Hx     Cataracts Neg Hx     Diabetes Neg Hx     Glaucoma Neg Hx     Hypertension Neg Hx     Macular degeneration Neg Hx     Retinal detachment Neg Hx     Strabismus Neg Hx     Stroke Neg Hx     Thyroid disease Neg Hx     Celiac disease Neg Hx     Colon polyps Neg Hx     Cystic fibrosis Neg Hx     Crohn's disease Neg Hx     Inflammatory bowel disease Neg Hx     Liver cancer Neg Hx     Rectal cancer Neg Hx     Stomach cancer Neg Hx     Ulcerative colitis Neg Hx     Lymphoma Neg Hx      Social History     Tobacco Use    Smoking status: Never Smoker    Smokeless tobacco: Never Used   Substance Use Topics    Alcohol use: Never    Drug use: No     Review of Systems   Constitutional: Negative for chills and fever.   HENT: Negative for congestion and trouble swallowing.    Eyes: Negative for visual disturbance.   Respiratory: Negative for cough and shortness of breath.    Cardiovascular: Negative for chest pain.   Gastrointestinal: Negative for abdominal pain, nausea and vomiting.   Endocrine: Negative for polydipsia and polyuria.   Genitourinary: Negative for difficulty urinating and dysuria.   Musculoskeletal: Positive for back pain.   Skin: Negative for rash.   Allergic/Immunologic: Negative for immunocompromised state.   Neurological: Positive for numbness. Negative for dizziness and headaches.       Physical Exam     Initial Vitals [08/22/22 1926]   BP Pulse Resp Temp SpO2   (!) 98/55 (!) 56 17 98.2 °F (36.8 °C) 99 %      MAP        --         Physical Exam    Nursing note and vitals reviewed.  Constitutional: She is not diaphoretic. She appears distressed.   HENT:   Head: Normocephalic and atraumatic.   Neck:    Full passive range of motion without pain.     Cardiovascular: Normal rate and regular rhythm. Exam reveals no gallop.    No murmur heard.  Pulses:       Radial pulses are 2+ on the right side and 2+ on the left side.        Dorsalis pedis pulses are 2+ on the right side and 2+ on the left side.        Posterior tibial pulses are 2+ on the right side and 2+ on the left side.   Pulmonary/Chest: No respiratory distress. She has no decreased breath sounds. She has no wheezes. She has no rhonchi. She has no rales.   Abdominal: Abdomen is soft. There is no abdominal tenderness.   Musculoskeletal:         General: No edema.      Cervical back: Full passive range of motion without pain. No rigidity.      Thoracic back: No swelling, deformity, spasms or tenderness.      Lumbar back: Tenderness present. No swelling, deformity or spasms. Negative right straight leg raise test and negative left straight leg raise test.     Neurological: She is alert and oriented to person, place, and time. No cranial nerve deficit. GCS eye subscore is 4. GCS verbal subscore is 5. GCS motor subscore is 6.   Equal strength and normal sensation to touch throughout the lower extremities.   Skin: Skin is warm and dry. No pallor.   Psychiatric: She has a normal mood and affect. Her speech is normal and behavior is normal. She is not actively hallucinating. She is attentive.         ED Course   Procedures  Labs Reviewed   CBC W/ AUTO DIFFERENTIAL - Abnormal; Notable for the following components:       Result Value    RBC 3.64 (*)     Hemoglobin 9.6 (*)     Hematocrit 31.7 (*)     MCH 26.4 (*)     MCHC 30.3 (*)     Lymph % 16.5 (*)     All other components within normal limits   COMPREHENSIVE METABOLIC PANEL - Abnormal; Notable for the following components:    Albumin  3.2 (*)     ALT 9 (*)     All other components within normal limits          Imaging Results          CT Lumbar Spine Without Contrast (Final result)  Result time 08/23/22 00:00:37    Final result by Miranda Hernadez MD (08/23/22 00:00:37)                 Impression:      No appreciable acute abnormality involving the imaged thoracolumbar spine.  No acute subluxation.    Multilevel lumbar spondylosis and postoperative changes as discussed in detail above at each disc level.  No interval significant detrimental changes.    Dextroscoliosis of the lumbar spine and stable lateral subluxations as described.      Electronically signed by: Miranda Hernadez  Date:    08/23/2022  Time:    00:00             Narrative:    EXAMINATION:  CT LUMBAR SPINE WITHOUT CONTRAST    CLINICAL HISTORY:  Low back pain, trauma;    TECHNIQUE:  Low-dose axial, sagittal and coronal reformations are obtained through the lumbar spine.  Contrast was not administered.    COMPARISON:  CT lumbar spine 04/10/2017, CT abdomen pelvis 05/14/2020    FINDINGS:  There is stable moderate dextroscoliosis of the lumbar spine without anterior to posterior subluxation.  Left lateral subluxation of L1 relative to L2 and right lateral subluxation of L2 relative to L3 again noted.  No acute compression fractures are seen from T10 through the sacrum.    Postoperative changes of transpedicular posterior fusion with hardware from the L3 through S1 levels and interbody fusion at L3-L4 and L4-L5 as well as posterior decompression from L3 through S1.  Hardware grossly appears to be intact and stable as imaged allowing for artifact    There is no convincing acute fracture involving the thoracolumbar spine.  Osteopenia limits assessment for nondisplaced fractures particularly along the sacrum.    Pump tubing extends from the dorsal paraspinous soft tissues in the spinal canal at the T11-12 level extending cephalad with the tip in the canal abutting the T11 left pedicle  appearing stable.  At T9-T10 disc level, 2 wires extend from the left paraspinous dorsal soft tissues into the spinal canal and is not included extending more cephalad.    T12-L1: No bony canal stenosis or neural foraminal appreciable stenosis.  Facet hypertrophic changes noted.    L1-L2: Facet hypertrophic changes contribute to mild canal and moderate to severe left neural foraminal stenosis.  No right neural foraminal stenosis.    L2-L3: There is moderate spondylosis with loss of disc height and prominent facet hypertrophic changes.  There is no significant canal stenosis.  There is mild bilateral neural foraminal stenosis.    L3-L4 and L4-L5: These levels are fused with metal artifact limiting assessment of the canal and adjacent soft tissues.  No stenosis.    L5-S1: Posterior decompression noted.  No canal or foraminal significant bony stenosis.    Sacroiliac joint degenerative changes noted bilaterally.  No lytic or sclerotic suspicious osseous lesions are seen.    The imaged abdominal and pelvic structures are unremarkable for acute abnormality or significant focal visceral findings.  Motley catheter is noted within partially visualized bladder.                                 Medications   lactated ringers bolus 1,000 mL (0 mLs Intravenous Stopped 8/23/22 0028)   HYDROmorphone injection 1 mg (1 mg Intramuscular Given 8/22/22 2300)   ondansetron injection 4 mg (4 mg Intravenous Given 8/22/22 2300)   HYDROmorphone (PF) injection 2 mg (2 mg Intravenous Given 8/23/22 0031)     Medical Decision Making:   History:   Old Medical Records: I decided to obtain old medical records.  Old Records Summarized: other records.       <> Summary of Records: PMH reviewed as above.  Clinical Tests:   Lab Tests: Ordered and Reviewed  Radiological Study: Ordered and Reviewed    MDM:  The patient underwent emergent evaluation for the above.  She reported recent fall with back trauma so CT imaging of the lumbar spine was obtained  which revealed multilevel degenerative changes but no acute traumatic findings.  She had no focal motor sensory deficits on exam.  She had benign abdominal exam.  Her pain improved with ED management.  She was able to stand and ambulate at the time of discharge.                      Clinical Impression:   Final diagnoses:  [M54.50, G89.29] Acute exacerbation of chronic low back pain (Primary)  [M54.31, M54.32] Bilateral sciatica          ED Disposition Condition    Discharge         ED Prescriptions     None        Follow-up Information     Follow up With Specialties Details Why Contact Info    Mesfin Hodges II, MD Internal Medicine  Schedule close follow-up with your primary care provider. [address]  [place] [address]  [phone]      ER   Return to this ER or visit any other ER should you have any concerns that you feel need immediate attention.            Steve Vann III, MD  08/26/22 0132

## 2022-08-23 NOTE — TELEPHONE ENCOUNTER
----- Message from Philly Ross sent at 8/23/2022  9:17 AM CDT -----  Regarding: Christi  Contact: @[phone]  Pt is requesting a call back from Christi pt states that she had to go to the hospital and they gave her someone else paperwork. Pt states UTI infection , please call to discuss further.

## 2022-08-23 NOTE — ED NOTES
Blankets provided for comfort. Patient is requesting pain medication for back pain. Dr. Vann informed of patients pain. Waiting on further orders.

## 2022-08-29 ENCOUNTER — DOCUMENT SCAN (OUTPATIENT)
Dept: HOME HEALTH SERVICES | Facility: HOSPITAL | Age: 67
End: 2022-08-29
Payer: MEDICARE

## 2022-08-29 RX ORDER — DOXYCYCLINE 100 MG/1
100 CAPSULE ORAL 2 TIMES DAILY
Qty: 14 CAPSULE | Refills: 0 | Status: SHIPPED | OUTPATIENT
Start: 2022-08-29 | End: 2022-09-05

## 2022-08-30 NOTE — TELEPHONE ENCOUNTER
Called the patient.  She states that she has leakage, was better controlled after the bulking agent and Botox. However, she has leakage starting yesterday.  Had MSSA in the urine from 8/16/2022.  Doxycycline sent since she is allergic to Bactrim DS

## 2022-08-31 DIAGNOSIS — Z78.0 MENOPAUSE: ICD-10-CM

## 2022-09-12 ENCOUNTER — HOSPITAL ENCOUNTER (OUTPATIENT)
Dept: PREADMISSION TESTING | Facility: HOSPITAL | Age: 67
Discharge: HOME | End: 2022-09-12
Attending: PODIATRIST
Payer: MEDICARE

## 2022-09-12 ENCOUNTER — ANESTHESIA EVENT (OUTPATIENT)
Dept: SURGERY | Facility: HOSPITAL | Age: 67
End: 2022-09-12
Payer: MEDICARE

## 2022-09-12 VITALS
SYSTOLIC BLOOD PRESSURE: 100 MMHG | HEIGHT: 64 IN | OXYGEN SATURATION: 97 % | TEMPERATURE: 97 F | BODY MASS INDEX: 31.07 KG/M2 | HEART RATE: 52 BPM | DIASTOLIC BLOOD PRESSURE: 47 MMHG | WEIGHT: 182 LBS

## 2022-09-12 DIAGNOSIS — G47.01 INSOMNIA DUE TO MEDICAL CONDITION: Chronic | ICD-10-CM

## 2022-09-12 DIAGNOSIS — F41.9 ANXIETY: ICD-10-CM

## 2022-09-12 DIAGNOSIS — G47.00 INSOMNIA, UNSPECIFIED TYPE: ICD-10-CM

## 2022-09-12 RX ORDER — QUETIAPINE FUMARATE 100 MG/1
TABLET, FILM COATED ORAL
Qty: 180 TABLET | Refills: 0 | Status: SHIPPED | OUTPATIENT
Start: 2022-09-12 | End: 2023-01-06 | Stop reason: SDUPTHER

## 2022-09-12 RX ORDER — FLUOXETINE HYDROCHLORIDE 20 MG/1
60 CAPSULE ORAL DAILY
Qty: 270 CAPSULE | Refills: 0 | Status: SHIPPED | OUTPATIENT
Start: 2022-09-12 | End: 2023-01-06 | Stop reason: SDUPTHER

## 2022-09-12 RX ORDER — LIDOCAINE HYDROCHLORIDE 10 MG/ML
1 INJECTION, SOLUTION EPIDURAL; INFILTRATION; INTRACAUDAL; PERINEURAL ONCE
Status: CANCELLED | OUTPATIENT
Start: 2022-09-12 | End: 2022-09-12

## 2022-09-12 RX ORDER — QUETIAPINE FUMARATE 25 MG/1
TABLET, FILM COATED ORAL
Qty: 90 TABLET | Refills: 0 | Status: ON HOLD | OUTPATIENT
Start: 2022-09-12 | End: 2023-01-19 | Stop reason: HOSPADM

## 2022-09-12 RX ORDER — SCOLOPAMINE TRANSDERMAL SYSTEM 1 MG/1
1 PATCH, EXTENDED RELEASE TRANSDERMAL
Status: CANCELLED | OUTPATIENT
Start: 2022-09-12

## 2022-09-12 RX ORDER — SODIUM CHLORIDE, SODIUM LACTATE, POTASSIUM CHLORIDE, CALCIUM CHLORIDE 600; 310; 30; 20 MG/100ML; MG/100ML; MG/100ML; MG/100ML
INJECTION, SOLUTION INTRAVENOUS CONTINUOUS
Status: CANCELLED | OUTPATIENT
Start: 2022-09-12

## 2022-09-12 RX ORDER — TRAZODONE HYDROCHLORIDE 300 MG/1
300 TABLET ORAL NIGHTLY
Qty: 90 TABLET | Refills: 0 | Status: SHIPPED | OUTPATIENT
Start: 2022-09-12 | End: 2023-01-06 | Stop reason: SDUPTHER

## 2022-09-12 NOTE — ANESTHESIA PREPROCEDURE EVALUATION
"                                                                                                             09/12/2022     Tasha Hawley is a 67 y.o., female scheduled for EXCISION, BONE SPUR, FOOT (Left) 9/16/22.    Per cardiology Dr. Ceballos, "I am clearing the patient for her upcoming foot surgery."    Patient has a diluadid intrathecal pump from Dr. Hillman. Unable to find any notes.    Past Medical History:   Diagnosis Date    Anticoagulant long-term use     Anxiety     Arthritis     Bilateral lower extremity edema     severe chronic    Carotid artery occlusion     Cataract     CHF (congestive heart failure)     Coronary artery disease     subtotalled LAD with collateral    Depression     Fever blister     Hypothyroid     Iron deficiency anemia     Lumbar radiculopathy     with chronic pain    Ocular migraine     Renal disorder     Sleep apnea     cpap     Past Surgical History:   Procedure Laterality Date    ADENOIDECTOMY      BACK SURGERY      x 12    CARDIAC CATHETERIZATION  2016    subtotalled LAD with right to left collaterals    CATARACT EXTRACTION W/  INTRAOCULAR LENS IMPLANT Left     Dr Coleman     CYSTOSCOPIC LITHOLAPAXY N/A 6/27/2019    Procedure: CYSTOLITHOLAPAXY;  Surgeon: Shireen Mayo MD;  Location: [facility] OR 2ND FLR;  Service: Urology;  Laterality: N/A;    CYSTOSCOPIC LITHOLAPAXY N/A 9/3/2019    Procedure: CYSTOLITHOLAPAXY;  Surgeon: Shireen Mayo MD;  Location: [facility] OR 2ND FLR;  Service: Urology;  Laterality: N/A;    CYSTOSCOPY N/A 7/13/2021    Procedure: CYSTOSCOPY;  Surgeon: Shireen Mayo MD;  Location: [facility] OR 1ST FLR;  Service: Urology;  Laterality: N/A;    CYSTOSCOPY  11/16/2021    Procedure: CYSTOSCOPY;  Surgeon: Shireen Mayo MD;  Location: [facility] OR 1ST FLR;  Service: Urology;;    CYSTOSCOPY  7/19/2022    Procedure: CYSTOSCOPY;  Surgeon: Shireen Mayo MD;  Location: [facility] OR 1ST FLR;  Service: Urology;;    CYSTOSCOPY WITH INJECTION OF " PERIURETHRAL BULKING AGENT  7/19/2022    Procedure: CYSTOSCOPY, WITH PERIURETHRAL BULKING AGENT INJECTION-MACROPLASTIQUE;  Surgeon: Shireen Mayo MD;  [facility];  Service: Urology;;    ESOPHAGOGASTRODUODENOSCOPY N/A 5/23/2018    Procedure: ESOPHAGOGASTRODUODENOSCOPY (EGD);  Surgeon: Prince Vance MD;  Location: [facility] (4TH FLR);  Service: Endoscopy;  Laterality: N/A;  r/s 'd per Dr. Vance due to family emergency- ER    HYSTERECTOMY  1975    endometriosis    INJECTION OF BOTULINUM TOXIN TYPE A  7/13/2021    Procedure: INJECTION, BOTULINUM TOXIN, 200units;  Surgeon: Shireen Mayo MD;  [facility];  Service: Urology;;    INJECTION OF BOTULINUM TOXIN TYPE A  11/16/2021    Procedure: INJECTION, BOTULINUM TOXIN, 200units;  Surgeon: Shireen Mayo MD;  [facility];  Service: Urology;;    INJECTION OF BOTULINUM TOXIN TYPE A  7/19/2022    Procedure: INJECTION, BOTULINUM TOXIN, 300 units ;  Surgeon: Shireen Mayo MD;  [facility];  Service: Urology;;    pain pump placement      SQ Dilaudid Pump managed by Dr. Hillman, Pain Management    REPLACEMENT OF CATHETER N/A 10/31/2019    Procedure: REPLACEMENT, CATHETER-SUPRAPUBIC;  Surgeon: Shireen Mayo MD;  [facility];  Service: Urology;  Laterality: N/A;    SPINAL CORD STIMULATOR REMOVAL      before [place]issa    SPINE SURGERY  5-13-13    CERVICAL FUSION    TONSILLECTOMY         Pre-op Assessment    I have reviewed the Patient Summary Reports.     I have reviewed the Nursing Notes.    I have reviewed the Medications.     Review of Systems  Anesthesia Hx:  Hx of Anesthetic complications  History of prior surgery of interest to airway management or planning: cervical fusion. Personal Hx of Anesthesia complications, Post-Operative Nausea/Vomiting, with every anesthetic, treatment not known   Social:  Non-Smoker, Social Alcohol Use    Cardiovascular:   Exercise tolerance: poor  CAD   CHF    Pulmonary:   Denies Shortness of breath. Sleep Apnea    Education provided regarding risk of obstructive sleep apnea     Renal/:   Chronic Renal Disease    Hepatic/GI:   GERD, poorly controlled    Musculoskeletal:   Arthritis   Spine Disorders: lumbar Disc disease and Degenerative disease    Neurological:   Denies TIA. Denies CVA. Neuromuscular Disease,  Headaches Denies Seizures.    Endocrine:   Hypothyroidism  Obesity / BMI > 30  Psych:   anxiety depression          Physical Exam  General: Well nourished and Cooperative    Airway:  Mallampati: II   Mouth Opening: Normal  TM Distance: Normal  Tongue: Normal  Neck ROM: Normal ROM    Dental:  Intact, Dentures  Dentures upper and lower  Chest/Lungs:  Clear to auscultation, Normal Respiratory Rate    Heart:  Rate: Normal  Rhythm: Regular Rhythm  Sounds: Normal      Lab Results   Component Value Date    WBC 7.39 08/22/2022    HGB 9.6 (L) 08/22/2022    HCT 31.7 (L) 08/22/2022     08/22/2022    CHOL 141 04/05/2022    TRIG 66 04/05/2022    HDL 52 04/05/2022    ALT 9 (L) 08/22/2022    AST 12 08/22/2022     08/22/2022    K 4.0 08/22/2022     08/22/2022    CREATININE 0.8 08/22/2022    BUN 11 08/22/2022    CO2 28 08/22/2022    TSH 11.054 (H) 07/22/2022    INR 1.0 01/14/2021    HGBA1C 5.9 (H) 07/28/2022     Results for orders placed or performed in visit on 08/22/22   EKG 12-lead    Collection Time: 08/22/22  9:02 AM    Narrative    Test Reason : I89.0,    Vent. Rate : 075 BPM     Atrial Rate : 054 BPM     P-R Int : 120 ms          QRS Dur : 110 ms      QT Int : 522 ms       P-R-T Axes : 034 -17 079 degrees     QTc Int : 582 ms    Sinus bradycardia with frequent and consecutive Premature ventricular  complexes with ventricular escape complexes  Possible Left atrial enlargement  Anterolateral infarct (cited on or before 25-JUL-2022)  Prolonged QT  Abnormal ECG  When compared with ECG of 27-JUL-2022 21:48,  Significant changes have  occurred  Confirmed by Danny Hope III, MD ([id number]) on 8/22/2022 1:35:39 PM    Referred By: KATHERIN MOTA           Confirmed By:Danny Hope III, MD         Anesthesia Plan  Type of Anesthesia, risks & benefits discussed:    Anesthesia Type: MAC, Gen ETT  Intra-op Monitoring Plan: Standard ASA Monitors  Post Op Pain Control Plan: multimodal analgesia  Induction:  IV  ASA Score: 3  Anesthesia Plan Notes: Anesthesia consent to be signed prior to surgery 9/16/22      Ready For Surgery From Anesthesia Perspective.     .

## 2022-09-15 ENCOUNTER — TELEPHONE (OUTPATIENT)
Dept: GASTROENTEROLOGY | Facility: CLINIC | Age: 67
End: 2022-09-15
Payer: MEDICARE

## 2022-09-15 NOTE — TELEPHONE ENCOUNTER
Return call and spoke with patient. Patient request for Dr. Vance to give her a call today. Patient was inform that Dr. Vance was not available and if she wanted to leave a message for him.     Patient stated that she has been having really bad esophagus problems with difficulty swallowing and choking a lot. Patient ask for Dr. Vance recommendation and if he can call her back.

## 2022-09-15 NOTE — TELEPHONE ENCOUNTER
----- Message from Bryanna Crowder sent at 9/15/2022  4:03 PM CDT -----  Regarding: Plan of care  Contact: [phone]  Calling to speak with provider regarding esophagus and plan of care going forward. Please call

## 2022-09-15 NOTE — TELEPHONE ENCOUNTER
Return call and spoke with patient. Inform patient of Dr. Vance schedule. Patient will call the office back.

## 2022-09-15 NOTE — TELEPHONE ENCOUNTER
----- Message from Bryanna Crowder sent at 9/15/2022  4:03 PM CDT -----  Regarding: Plan of care  Contact: [phone]  Calling to speak with provider regarding esophagus and plan of care going forward. Please call

## 2022-09-15 NOTE — TELEPHONE ENCOUNTER
----- Message from Philly Ross sent at 9/15/2022  8:48 AM CDT -----  Contact: @[phone]  Pt is calling in to get an appt, please call to discuss further.

## 2022-09-16 ENCOUNTER — ANESTHESIA (OUTPATIENT)
Dept: SURGERY | Facility: HOSPITAL | Age: 67
End: 2022-09-16
Payer: MEDICARE

## 2022-09-16 ENCOUNTER — HOSPITAL ENCOUNTER (OUTPATIENT)
Facility: HOSPITAL | Age: 67
Discharge: HOME | End: 2022-09-19
Attending: PODIATRIST | Admitting: PODIATRIST
Payer: MEDICARE

## 2022-09-16 DIAGNOSIS — M14.679 CHARCOT ANKLE, UNSPECIFIED LATERALITY: ICD-10-CM

## 2022-09-16 DIAGNOSIS — M79.662 PAIN IN BOTH LOWER LEGS: Primary | ICD-10-CM

## 2022-09-16 DIAGNOSIS — M79.661 PAIN IN BOTH LOWER LEGS: Primary | ICD-10-CM

## 2022-09-16 DIAGNOSIS — Z74.09 IMPAIRED FUNCTIONAL MOBILITY AND ACTIVITY TOLERANCE: ICD-10-CM

## 2022-09-16 PROCEDURE — 63600175 PHARM REV CODE 636 W HCPCS: Performed by: NURSE ANESTHETIST, CERTIFIED REGISTERED

## 2022-09-16 PROCEDURE — 36000706: Performed by: PODIATRIST

## 2022-09-16 PROCEDURE — 25000003 PHARM REV CODE 250: Performed by: PODIATRIST

## 2022-09-16 PROCEDURE — 25000003 PHARM REV CODE 250: Performed by: NURSE ANESTHETIST, CERTIFIED REGISTERED

## 2022-09-16 PROCEDURE — 88311 DECALCIFY TISSUE: CPT | Mod: 59 | Performed by: PATHOLOGY

## 2022-09-16 PROCEDURE — P9045 ALBUMIN (HUMAN), 5%, 250 ML: HCPCS | Mod: JG | Performed by: NURSE ANESTHETIST, CERTIFIED REGISTERED

## 2022-09-16 PROCEDURE — 88311 PR  DECALCIFY TISSUE: ICD-10-PCS | Mod: 26,,, | Performed by: PATHOLOGY

## 2022-09-16 PROCEDURE — 27800903 OPTIME MED/SURG SUP & DEVICES OTHER IMPLANTS: Performed by: PODIATRIST

## 2022-09-16 PROCEDURE — 88305 TISSUE EXAM BY PATHOLOGIST: ICD-10-PCS | Mod: 26,,, | Performed by: PATHOLOGY

## 2022-09-16 PROCEDURE — 37000008 HC ANESTHESIA 1ST 15 MINUTES: Performed by: PODIATRIST

## 2022-09-16 PROCEDURE — 88305 TISSUE EXAM BY PATHOLOGIST: CPT | Mod: 26,,, | Performed by: PATHOLOGY

## 2022-09-16 PROCEDURE — 63600175 PHARM REV CODE 636 W HCPCS: Performed by: PODIATRIST

## 2022-09-16 PROCEDURE — 88305 TISSUE EXAM BY PATHOLOGIST: CPT | Mod: 59 | Performed by: PATHOLOGY

## 2022-09-16 PROCEDURE — 36000707: Performed by: PODIATRIST

## 2022-09-16 PROCEDURE — 27201423 OPTIME MED/SURG SUP & DEVICES STERILE SUPPLY: Performed by: PODIATRIST

## 2022-09-16 PROCEDURE — 71000033 HC RECOVERY, INTIAL HOUR: Performed by: PODIATRIST

## 2022-09-16 PROCEDURE — 37000009 HC ANESTHESIA EA ADD 15 MINS: Performed by: PODIATRIST

## 2022-09-16 PROCEDURE — 71000039 HC RECOVERY, EACH ADD'L HOUR: Performed by: PODIATRIST

## 2022-09-16 PROCEDURE — 88311 DECALCIFY TISSUE: CPT | Mod: 26,,, | Performed by: PATHOLOGY

## 2022-09-16 PROCEDURE — 63600175 PHARM REV CODE 636 W HCPCS: Performed by: ANESTHESIOLOGY

## 2022-09-16 DEVICE — IMPLANTABLE DEVICE: Type: IMPLANTABLE DEVICE | Site: FOOT | Status: FUNCTIONAL

## 2022-09-16 RX ORDER — ATORVASTATIN CALCIUM 40 MG/1
80 TABLET, FILM COATED ORAL NIGHTLY
Status: DISCONTINUED | OUTPATIENT
Start: 2022-09-16 | End: 2022-09-19 | Stop reason: HOSPADM

## 2022-09-16 RX ORDER — ONDANSETRON 8 MG/1
8 TABLET, ORALLY DISINTEGRATING ORAL EVERY 6 HOURS PRN
Status: DISCONTINUED | OUTPATIENT
Start: 2022-09-16 | End: 2022-09-19 | Stop reason: HOSPADM

## 2022-09-16 RX ORDER — KETOROLAC TROMETHAMINE 30 MG/ML
30 INJECTION, SOLUTION INTRAMUSCULAR; INTRAVENOUS EVERY 6 HOURS PRN
Status: DISCONTINUED | OUTPATIENT
Start: 2022-09-16 | End: 2022-09-18

## 2022-09-16 RX ORDER — PHENYLEPHRINE HYDROCHLORIDE 10 MG/ML
INJECTION INTRAVENOUS
Status: DISCONTINUED | OUTPATIENT
Start: 2022-09-16 | End: 2022-09-16

## 2022-09-16 RX ORDER — SCOLOPAMINE TRANSDERMAL SYSTEM 1 MG/1
1 PATCH, EXTENDED RELEASE TRANSDERMAL
Status: DISCONTINUED | OUTPATIENT
Start: 2022-09-16 | End: 2022-09-16 | Stop reason: HOSPADM

## 2022-09-16 RX ORDER — ONDANSETRON 8 MG/1
8 TABLET, ORALLY DISINTEGRATING ORAL ONCE
Status: DISCONTINUED | OUTPATIENT
Start: 2022-09-16 | End: 2022-09-16

## 2022-09-16 RX ORDER — EPHEDRINE SULFATE 50 MG/ML
INJECTION, SOLUTION INTRAVENOUS
Status: DISCONTINUED | OUTPATIENT
Start: 2022-09-16 | End: 2022-09-16

## 2022-09-16 RX ORDER — PROPOFOL 10 MG/ML
VIAL (ML) INTRAVENOUS CONTINUOUS PRN
Status: DISCONTINUED | OUTPATIENT
Start: 2022-09-16 | End: 2022-09-16

## 2022-09-16 RX ORDER — LIDOCAINE 50 MG/G
1 PATCH TOPICAL DAILY PRN
Status: DISCONTINUED | OUTPATIENT
Start: 2022-09-16 | End: 2022-09-19 | Stop reason: HOSPADM

## 2022-09-16 RX ORDER — PROPOFOL 10 MG/ML
VIAL (ML) INTRAVENOUS
Status: DISCONTINUED | OUTPATIENT
Start: 2022-09-16 | End: 2022-09-16

## 2022-09-16 RX ORDER — KETAMINE HCL IN 0.9 % NACL 50 MG/5 ML
SYRINGE (ML) INTRAVENOUS
Status: DISCONTINUED | OUTPATIENT
Start: 2022-09-16 | End: 2022-09-16

## 2022-09-16 RX ORDER — LIDOCAINE HYDROCHLORIDE 20 MG/ML
INJECTION, SOLUTION EPIDURAL; INFILTRATION; INTRACAUDAL; PERINEURAL
Status: DISCONTINUED | OUTPATIENT
Start: 2022-09-16 | End: 2022-09-16

## 2022-09-16 RX ORDER — SODIUM CHLORIDE, SODIUM LACTATE, POTASSIUM CHLORIDE, CALCIUM CHLORIDE 600; 310; 30; 20 MG/100ML; MG/100ML; MG/100ML; MG/100ML
INJECTION, SOLUTION INTRAVENOUS CONTINUOUS
Status: DISCONTINUED | OUTPATIENT
Start: 2022-09-16 | End: 2022-09-18

## 2022-09-16 RX ORDER — OXYCODONE HYDROCHLORIDE 5 MG/1
5 TABLET ORAL
Status: DISCONTINUED | OUTPATIENT
Start: 2022-09-16 | End: 2022-09-16 | Stop reason: HOSPADM

## 2022-09-16 RX ORDER — QUETIAPINE FUMARATE 100 MG/1
200 TABLET, FILM COATED ORAL NIGHTLY PRN
Status: DISCONTINUED | OUTPATIENT
Start: 2022-09-16 | End: 2022-09-19 | Stop reason: HOSPADM

## 2022-09-16 RX ORDER — MAG HYDROX/ALUMINUM HYD/SIMETH 200-200-20
30 SUSPENSION, ORAL (FINAL DOSE FORM) ORAL
Status: DISCONTINUED | OUTPATIENT
Start: 2022-09-17 | End: 2022-09-18

## 2022-09-16 RX ORDER — CEFAZOLIN SODIUM 2 G/50ML
2 SOLUTION INTRAVENOUS
Status: DISCONTINUED | OUTPATIENT
Start: 2022-09-16 | End: 2022-09-18

## 2022-09-16 RX ORDER — ONDANSETRON 2 MG/ML
4 INJECTION INTRAMUSCULAR; INTRAVENOUS DAILY PRN
Status: DISCONTINUED | OUTPATIENT
Start: 2022-09-16 | End: 2022-09-16 | Stop reason: HOSPADM

## 2022-09-16 RX ORDER — CEFAZOLIN SODIUM 1 G/3ML
INJECTION, POWDER, FOR SOLUTION INTRAMUSCULAR; INTRAVENOUS
Status: DISCONTINUED | OUTPATIENT
Start: 2022-09-16 | End: 2022-09-16

## 2022-09-16 RX ORDER — SENNOSIDES 8.6 MG/1
17.2 TABLET ORAL 2 TIMES DAILY PRN
Status: DISCONTINUED | OUTPATIENT
Start: 2022-09-16 | End: 2022-09-19 | Stop reason: HOSPADM

## 2022-09-16 RX ORDER — LIDOCAINE HYDROCHLORIDE 10 MG/ML
1 INJECTION, SOLUTION EPIDURAL; INFILTRATION; INTRACAUDAL; PERINEURAL ONCE
Status: DISCONTINUED | OUTPATIENT
Start: 2022-09-16 | End: 2022-09-16 | Stop reason: HOSPADM

## 2022-09-16 RX ORDER — TRAZODONE HYDROCHLORIDE 100 MG/1
300 TABLET ORAL NIGHTLY PRN
Status: DISCONTINUED | OUTPATIENT
Start: 2022-09-16 | End: 2022-09-19 | Stop reason: HOSPADM

## 2022-09-16 RX ORDER — MIDAZOLAM HYDROCHLORIDE 1 MG/ML
INJECTION, SOLUTION INTRAMUSCULAR; INTRAVENOUS
Status: DISCONTINUED | OUTPATIENT
Start: 2022-09-16 | End: 2022-09-16

## 2022-09-16 RX ORDER — ALBUMIN HUMAN 50 G/1000ML
SOLUTION INTRAVENOUS CONTINUOUS PRN
Status: DISCONTINUED | OUTPATIENT
Start: 2022-09-16 | End: 2022-09-16

## 2022-09-16 RX ORDER — HYDROMORPHONE HYDROCHLORIDE 2 MG/ML
0.5 INJECTION, SOLUTION INTRAMUSCULAR; INTRAVENOUS; SUBCUTANEOUS EVERY 5 MIN PRN
Status: DISCONTINUED | OUTPATIENT
Start: 2022-09-16 | End: 2022-09-16 | Stop reason: HOSPADM

## 2022-09-16 RX ORDER — SUCRALFATE 1 G/10ML
1 SUSPENSION ORAL EVERY 6 HOURS
Status: DISCONTINUED | OUTPATIENT
Start: 2022-09-17 | End: 2022-09-18

## 2022-09-16 RX ORDER — HYDROCODONE BITARTRATE AND ACETAMINOPHEN 7.5; 325 MG/1; MG/1
1 TABLET ORAL EVERY 6 HOURS PRN
Status: DISCONTINUED | OUTPATIENT
Start: 2022-09-16 | End: 2022-09-19 | Stop reason: HOSPADM

## 2022-09-16 RX ORDER — FLUOXETINE HYDROCHLORIDE 20 MG/1
60 CAPSULE ORAL DAILY
Status: DISCONTINUED | OUTPATIENT
Start: 2022-09-17 | End: 2022-09-19 | Stop reason: HOSPADM

## 2022-09-16 RX ORDER — DEXMEDETOMIDINE HYDROCHLORIDE 100 UG/ML
INJECTION, SOLUTION INTRAVENOUS
Status: DISCONTINUED | OUTPATIENT
Start: 2022-09-16 | End: 2022-09-16

## 2022-09-16 RX ORDER — BUPIVACAINE HYDROCHLORIDE AND EPINEPHRINE 2.5; 5 MG/ML; UG/ML
INJECTION, SOLUTION EPIDURAL; INFILTRATION; INTRACAUDAL; PERINEURAL
Status: DISCONTINUED | OUTPATIENT
Start: 2022-09-16 | End: 2022-09-16 | Stop reason: HOSPADM

## 2022-09-16 RX ORDER — TIZANIDINE 4 MG/1
4 TABLET ORAL 2 TIMES DAILY PRN
Status: DISCONTINUED | OUTPATIENT
Start: 2022-09-16 | End: 2022-09-19 | Stop reason: HOSPADM

## 2022-09-16 RX ADMIN — SODIUM CHLORIDE, SODIUM LACTATE, POTASSIUM CHLORIDE, AND CALCIUM CHLORIDE: .6; .31; .03; .02 INJECTION, SOLUTION INTRAVENOUS at 01:09

## 2022-09-16 RX ADMIN — EPHEDRINE SULFATE 10 MG: 50 INJECTION, SOLUTION INTRAMUSCULAR; INTRAVENOUS; SUBCUTANEOUS at 02:09

## 2022-09-16 RX ADMIN — HYDROMORPHONE HYDROCHLORIDE 0.5 MG: 2 INJECTION, SOLUTION INTRAMUSCULAR; INTRAVENOUS; SUBCUTANEOUS at 04:09

## 2022-09-16 RX ADMIN — CEFAZOLIN 2 G: 330 INJECTION, POWDER, FOR SOLUTION INTRAMUSCULAR; INTRAVENOUS at 01:09

## 2022-09-16 RX ADMIN — PROPOFOL 50 MCG/KG/MIN: 10 INJECTION, EMULSION INTRAVENOUS at 01:09

## 2022-09-16 RX ADMIN — SODIUM CHLORIDE, SODIUM LACTATE, POTASSIUM CHLORIDE, AND CALCIUM CHLORIDE: .6; .31; .03; .02 INJECTION, SOLUTION INTRAVENOUS at 03:09

## 2022-09-16 RX ADMIN — DEXMEDETOMIDINE HYDROCHLORIDE 8 MCG: 100 INJECTION, SOLUTION, CONCENTRATE INTRAVENOUS at 01:09

## 2022-09-16 RX ADMIN — LIDOCAINE HYDROCHLORIDE 60 MG: 20 INJECTION, SOLUTION EPIDURAL; INFILTRATION; INTRACAUDAL; PERINEURAL at 01:09

## 2022-09-16 RX ADMIN — PROPOFOL 20 MG: 10 INJECTION, EMULSION INTRAVENOUS at 01:09

## 2022-09-16 RX ADMIN — PROPOFOL 30 MG: 10 INJECTION, EMULSION INTRAVENOUS at 01:09

## 2022-09-16 RX ADMIN — Medication 10 MG: at 01:09

## 2022-09-16 RX ADMIN — GLYCOPYRROLATE 0.2 MG: 0.2 INJECTION, SOLUTION INTRAMUSCULAR; INTRAVITREAL at 01:09

## 2022-09-16 RX ADMIN — PHENYLEPHRINE HYDROCHLORIDE 100 MCG: 10 INJECTION INTRAVENOUS at 02:09

## 2022-09-16 RX ADMIN — GLYCOPYRROLATE 0.2 MG: 0.2 INJECTION, SOLUTION INTRAMUSCULAR; INTRAVITREAL at 02:09

## 2022-09-16 RX ADMIN — ALBUMIN (HUMAN): 12.5 SOLUTION INTRAVENOUS at 03:09

## 2022-09-16 RX ADMIN — EPHEDRINE SULFATE 5 MG: 50 INJECTION, SOLUTION INTRAMUSCULAR; INTRAVENOUS; SUBCUTANEOUS at 02:09

## 2022-09-16 RX ADMIN — MIDAZOLAM 2 MG: 1 INJECTION INTRAMUSCULAR; INTRAVENOUS at 01:09

## 2022-09-16 RX ADMIN — CEFAZOLIN SODIUM 2 G: 2 SOLUTION INTRAVENOUS at 08:09

## 2022-09-16 RX ADMIN — PHENYLEPHRINE HYDROCHLORIDE 100 MCG: 10 INJECTION INTRAVENOUS at 01:09

## 2022-09-16 RX ADMIN — HYDROCODONE BITARTRATE AND ACETAMINOPHEN 1 TABLET: 7.5; 325 TABLET ORAL at 08:09

## 2022-09-16 RX ADMIN — ATORVASTATIN CALCIUM 80 MG: 40 TABLET, FILM COATED ORAL at 10:09

## 2022-09-16 RX ADMIN — SUCRALFATE 1 G: 1 SUSPENSION ORAL at 11:09

## 2022-09-16 NOTE — OP NOTE
Preoperative diagnosis:  Charcot foot type deformity of mid tarsal joint with chronic ulcerations medial aspect of the midfoot bilateral    Postoperative diagnosis: Same    Surgeon:  Adam Mcguire DPM    Procedure:  Excision bony prominence of the tarsometatarsal joints plantar aspect of both feet to help alleviate abnormal pressure and prevent further formation of ulcerations.    Anesthesia:  Local infiltration using a mixture of 0.25% Marcaine with epinephrine 1 100,000 mixture total of 20 cc utilized with a partial tibial nerve block and regional block around the midtarsal joint and tarsometatarsal joint.  This was done bilateral and and MAC anesthesia    Hemostasis:  None    Complications: None    Operative narrative:  After anesthesia was administered patient was then prepped and draped in usual sterile manner.    Attention was then paid to the medial aspect of the left foot where a curvilinear incision was made over the tarsal metatarsal joints over the bony prominence on the medial aspect of the left foot.  Incision with made with 15. Blade.  Via sharp dissection dissection was carried down into the superficial deep fascia to expose the bony prominence of the tarsometatarsal joint specifically the 1st metatarsal cuneiform and navicular joint.    Care was ensured to preserve the muscular and tendinous structures flow plantarly as well as the tibialis posterior tendon  After adequate satisfactory exposure to the bony prominence using a sagittal saw and a combination bone rongeur hours and then osteotome and mallet all arthritic and deformed bone was removed and passed for pathological study..    After adequate inspection all rough edges were rasped smooth.    The area was flushed with copious amounts of sterile saline solution.     Prior to closure Graphix amniotic tissue was inserted into the surgical area.  This was to prevent infection, wound dehiscence and scarring.    After adequate placement of the  amniotic tissue closure was then accomplished using 3-0 Vicryl for deep and subcuticular tissue.  Skin edges were approximated using a combination of 3-0 and 2-0 nylon done in single layer fashion.  Dressings of sterile 4x4s and Coban dressing over the surgical area reinforced with 4 in cast padding and anchored with Ace wrap was applied.    Attention was then paid to the right foot with the exact same type of incision and dissection and bone removal was performed.    Patient was then taken from the OR to the recovery room awake and alert and oriented with vital signs stable pain management is adequate, airway was patent.  No anesthetic complications.    Cardiovascular status with blood pressure returned to baseline in hemodynamically was stable respiratory status was unassisted with noted spontaneous ventilation and room air.    Patient will be admitted to extended observation for physical therapy and pain control.

## 2022-09-16 NOTE — ANESTHESIA POSTPROCEDURE EVALUATION
Anesthesia Post Evaluation    Patient: Tasha Hawley    Procedure(s) Performed: Procedure(s) (LRB):  EXCISION, BONE SPUR, FOOT (Bilateral)    Final Anesthesia Type: MAC      Patient location during evaluation: PACU  Patient participation: Yes- Able to Participate  Level of consciousness: awake and alert and oriented  Post-procedure vital signs: reviewed and stable  Pain management: adequate  Airway patency: patent    PONV status at discharge: No PONV  Anesthetic complications: no      Cardiovascular status: blood pressure returned to baseline and hemodynamically stable  Respiratory status: unassisted, spontaneous ventilation and room air  Hydration status: euvolemic  Follow-up not needed.          Vitals Value Taken Time   /50 09/16/22 1600   Temp 98.0 09/16/22 1604   Pulse 70 09/16/22 1603   Resp 17 09/16/22 1603   SpO2 97 % 09/16/22 1603   Vitals shown include unvalidated device data.      No case tracking events are documented in the log.      Pain/Low Score: No data recorded

## 2022-09-16 NOTE — TRANSFER OF CARE
"Anesthesia Transfer of Care Note    Patient: Tasha Hawley    Procedure(s) Performed: Procedure(s) (LRB):  EXCISION, BONE SPUR, FOOT (Bilateral)    Patient location: PACU    Anesthesia Type: MAC    Transport from OR: Transported from OR on room air with adequate spontaneous ventilation    Post pain: adequate analgesia    Post assessment: no apparent anesthetic complications and tolerated procedure well    Post vital signs: stable    Level of consciousness: awake, alert and oriented    Nausea/Vomiting: no nausea/vomiting    Complications: none    Transfer of care protocol was followed      Last vitals:   Visit Vitals  BP (!) 114/56 (BP Location: Right arm, Patient Position: Sitting)   Pulse (!) 57   Temp 36.6 °C (97.9 °F) (Temporal)   Resp 16   Ht 5' 4" (1.626 m)   Wt 87.1 kg (192 lb)   LMP  (LMP Unknown)   SpO2 95%   Breastfeeding No   BMI 32.96 kg/m²     "

## 2022-09-16 NOTE — PROGRESS NOTES
Pt arrived to unit. Introduced self as VN for this shift. Admission questions completed by VN. Educated pt on safety precautions, and VN's role in pt care. Opportunity given for pt's questions. All questions answered.      09/16/22 2321   Nurse Notification   Bedside Nurse Notified? Yes   Name of Bedside Nurse Alan Mccain RN   Nurse Notfication Method Secure Chat   Nurse Notified Of Patient Request;Medications   Admission   Initial VN Admission Questions Complete   Communication Issues? None   Shift   Virtual Nurse - Patient Verbalized Approval Of Camera Use   Type of Frequent Check   Type Other (see comments)  (Admission)   Safety/Activity   Patient Rounds bed in low position;placement of personal items at bedside;call light in patient/parent reach;visualized patient   Safety Promotion/Fall Prevention assistive device/personal item within reach;family to remain at bedside;Fall Risk reviewed with patient/family;side rails raised x 3;instructed to call staff for mobility   Positioning   Body Position sitting up in bed   Head of Bed (HOB) Positioning HOB at 60-90 degrees   Pain/Comfort/Sleep   Preferred Pain Scale number (Numeric Rating Pain Scale)   Pain Rating (0-10): Rest 9

## 2022-09-16 NOTE — H&P
"H&P                                                                                                    09/12/2022     Tasha Hawley is a 67 y.o., female scheduled for EXCISION, BONE SPUR, FOOT (Left) 9/16/22.    Per cardiology Dr. Ceballos, "I am clearing the patient for her upcoming foot surgery."    Past Medical History:   Diagnosis Date    Anticoagulant long-term use     Anxiety     Arthritis     Bilateral lower extremity edema     severe chronic    Carotid artery occlusion     Cataract     CHF (congestive heart failure)     Coronary artery disease     subtotalled LAD with collateral    Depression     Fever blister     Hypothyroid     Iron deficiency anemia     Lumbar radiculopathy     with chronic pain    Ocular migraine     Renal disorder     Sleep apnea     cpap     Past Surgical History:   Procedure Laterality Date    ADENOIDECTOMY      BACK SURGERY      x 12    CARDIAC CATHETERIZATION  2016    subtotalled LAD with right to left collaterals    CATARACT EXTRACTION W/  INTRAOCULAR LENS IMPLANT Left     Dr Coleman     CYSTOSCOPIC LITHOLAPAXY N/A 6/27/2019    Procedure: CYSTOLITHOLAPAXY;  Surgeon: Shireen Mayo MD;  Location: [facility] OR 2ND FLR;  Service: Urology;  Laterality: N/A;    CYSTOSCOPIC LITHOLAPAXY N/A 9/3/2019    Procedure: CYSTOLITHOLAPAXY;  Surgeon: Shireen Mayo MD;  Location: [facility] OR 2ND FLR;  Service: Urology;  Laterality: N/A;    CYSTOSCOPY N/A 7/13/2021    Procedure: CYSTOSCOPY;  Surgeon: Shireen Mayo MD;  Location: [facility] OR 1ST FLR;  Service: Urology;  Laterality: N/A;    CYSTOSCOPY  11/16/2021    Procedure: CYSTOSCOPY;  Surgeon: Shireen Mayo MD;  Location: [facility] OR 1ST FLR;  Service: Urology;;    CYSTOSCOPY  7/19/2022    Procedure: CYSTOSCOPY;  Surgeon: Shireen Mayo MD;  Location: [facility] OR 1ST FLR;  Service: Urology;;    CYSTOSCOPY WITH INJECTION OF PERIURETHRAL BULKING AGENT  7/19/2022    Procedure: CYSTOSCOPY, WITH PERIURETHRAL BULKING AGENT INJECTION-MACROPLASTIQUE;  " Surgeon: Shireen Mayo MD;  [facility];  Service: Urology;;    ESOPHAGOGASTRODUODENOSCOPY N/A 5/23/2018    Procedure: ESOPHAGOGASTRODUODENOSCOPY (EGD);  Surgeon: Prince Vance MD;  Location: [facility] (4TH FLR);  Service: Endoscopy;  Laterality: N/A;  r/s 'd per Dr. Vance due to family emergency- ER    HYSTERECTOMY  1975    endometriosis    INJECTION OF BOTULINUM TOXIN TYPE A  7/13/2021    Procedure: INJECTION, BOTULINUM TOXIN, 200units;  Surgeon: Shireen Mayo MD;  [facility];  Service: Urology;;    INJECTION OF BOTULINUM TOXIN TYPE A  11/16/2021    Procedure: INJECTION, BOTULINUM TOXIN, 200units;  Surgeon: Shireen Mayo MD;  [facility];  Service: Urology;;    INJECTION OF BOTULINUM TOXIN TYPE A  7/19/2022    Procedure: INJECTION, BOTULINUM TOXIN, 300 units ;  Surgeon: Shireen Mayo MD;  [facility];  Service: Urology;;    pain pump placement      SQ Dilaudid Pump managed by Dr. Hillman, Pain Management    REPLACEMENT OF CATHETER N/A 10/31/2019    Procedure: REPLACEMENT, CATHETER-SUPRAPUBIC;  Surgeon: Shireen Mayo MD;  Location: [facility] OR [address] [place] [address];  Service: Urology;  Laterality: N/A;    SPINAL CORD STIMULATOR REMOVAL      before Larissa    SPINE SURGERY  5-13-13    CERVICAL FUSION    TONSILLECTOMY         Pre-op Assessment    I have reviewed the Patient Summary Reports.     I have reviewed the Nursing Notes.    I have reviewed the Medications.     Review of Systems  Anesthesia Hx:  Hx of Anesthetic complications  History of prior surgery of interest to airway management or planning: cervical fusion. Personal Hx of Anesthesia complications, Post-Operative Nausea/Vomiting, with every anesthetic, treatment not known   Social:  Non-Smoker, Social Alcohol Use    Cardiovascular:   Exercise tolerance: poor CAD   CHF    Pulmonary:   Denies Shortness of breath. Sleep Apnea    Education provided regarding risk of obstructive sleep apnea      Renal/:   Chronic Renal Disease    Hepatic/GI:   GERD, poorly controlled    Musculoskeletal:   Arthritis   Spine Disorders: lumbar Disc disease and Degenerative disease    Neurological:   Denies TIA. Denies CVA. Neuromuscular Disease,  Headaches Denies Seizures.    Endocrine:   Hypothyroidism  Obesity / BMI > 30  Psych:   anxiety depression          Physical Exam  General: Well nourished and Cooperative    Airway:  Mallampati: II   Mouth Opening: Normal  TM Distance: Normal  Tongue: Normal  Neck ROM: Normal ROM    Dental:  Intact, Dentures  Dentures upper and lower  Chest/Lungs:  Clear to auscultation, Normal Respiratory Rate    Heart:  Rate: Normal  Rhythm: Regular Rhythm  Sounds: Normal      Lab Results   Component Value Date    WBC 7.39 08/22/2022    HGB 9.6 (L) 08/22/2022    HCT 31.7 (L) 08/22/2022     08/22/2022    CHOL 141 04/05/2022    TRIG 66 04/05/2022    HDL 52 04/05/2022    ALT 9 (L) 08/22/2022    AST 12 08/22/2022     08/22/2022    K 4.0 08/22/2022     08/22/2022    CREATININE 0.8 08/22/2022    BUN 11 08/22/2022    CO2 28 08/22/2022    TSH 11.054 (H) 07/22/2022    INR 1.0 01/14/2021    HGBA1C 5.9 (H) 07/28/2022     Results for orders placed or performed in visit on 08/22/22   EKG 12-lead    Collection Time: 08/22/22  9:02 AM    Narrative    Test Reason : I89.0,    Vent. Rate : 075 BPM     Atrial Rate : 054 BPM     P-R Int : 120 ms          QRS Dur : 110 ms      QT Int : 522 ms       P-R-T Axes : 034 -17 079 degrees     QTc Int : 582 ms    Sinus bradycardia with frequent and consecutive Premature ventricular  complexes with ventricular escape complexes  Possible Left atrial enlargement  Anterolateral infarct (cited on or before 25-JUL-2022)  Prolonged QT  Abnormal ECG  When compared with ECG of 27-JUL-2022 21:48,  Significant changes have occurred  Confirmed by Danny Hope III, MD ([id number]) on 8/22/2022 1:35:39 PM    Referred By: KATHERIN MOTA           Confirmed By:Danny DANIELS  Jayy VAZQUEZ MD         Anesthesia Plan  Type of Anesthesia, risks & benefits discussed:    Anesthesia Type: MAC, Gen ETT  Intra-op Monitoring Plan: Standard ASA Monitors  Post Op Pain Control Plan: multimodal analgesia  Induction:  IV  ASA Score: 3  Anesthesia Plan Notes: Anesthesia consent to be signed prior to surgery 9/16/22      Ready For Surgery From Anesthesia Perspective.     .

## 2022-09-16 NOTE — ANESTHESIA POSTPROCEDURE EVALUATION
Anesthesia Post Evaluation    Patient: Tasha Hawley    Procedure(s) Performed: Procedure(s) (LRB):  EXCISION ARTHRITIC BONE, BILATERAL FOOT (Bilateral)    Final Anesthesia Type: MAC      Patient location during evaluation: PACU  Patient participation: Yes- Able to Participate  Level of consciousness: awake and alert  Post-procedure vital signs: reviewed and stable  Pain management: adequate  Airway patency: patent    PONV status at discharge: No PONV  Anesthetic complications: no      Cardiovascular status: blood pressure returned to baseline  Respiratory status: unassisted  Hydration status: euvolemic            Vitals Value Taken Time   /56 09/16/22 1745   Temp 36.4 °C (97.6 °F) 09/16/22 1745   Pulse 61 09/16/22 1745   Resp 16 09/16/22 1745   SpO2 100 % 09/16/22 1745         Event Time   Out of Recovery 17:26:04         Pain/Low Score: Pain Rating Prior to Med Admin: 9 (9/16/2022  4:39 PM)

## 2022-09-16 NOTE — PLAN OF CARE
Patient has met PACU discharge criteria, VSS, pain well controlled. Family updated by phone. Released from PACU by Dr. Zimmer

## 2022-09-17 PROBLEM — M77.32 CALCANEAL SPUR OF LEFT FOOT: Status: ACTIVE | Noted: 2022-09-17

## 2022-09-17 PROBLEM — M14.679: Status: ACTIVE | Noted: 2022-09-17

## 2022-09-17 PROBLEM — M79.672 LEFT FOOT PAIN: Status: ACTIVE | Noted: 2022-09-17

## 2022-09-17 PROCEDURE — 25000003 PHARM REV CODE 250: Performed by: PODIATRIST

## 2022-09-17 PROCEDURE — 97161 PT EVAL LOW COMPLEX 20 MIN: CPT

## 2022-09-17 PROCEDURE — 63600175 PHARM REV CODE 636 W HCPCS: Performed by: PODIATRIST

## 2022-09-17 RX ORDER — SODIUM CHLORIDE 9 MG/ML
INJECTION, SOLUTION INTRAVENOUS CONTINUOUS
Status: DISCONTINUED | OUTPATIENT
Start: 2022-09-17 | End: 2022-09-18

## 2022-09-17 RX ORDER — HYDROCODONE BITARTRATE AND ACETAMINOPHEN 10; 325 MG/1; MG/1
1 TABLET ORAL EVERY 6 HOURS PRN
Qty: 20 TABLET | Refills: 0 | Status: ON HOLD | OUTPATIENT
Start: 2022-09-17 | End: 2023-01-19 | Stop reason: HOSPADM

## 2022-09-17 RX ORDER — SODIUM CHLORIDE 9 MG/ML
INJECTION, SOLUTION INTRAVENOUS ONCE
Status: COMPLETED | OUTPATIENT
Start: 2022-09-17 | End: 2022-09-17

## 2022-09-17 RX ADMIN — KETOROLAC TROMETHAMINE 30 MG: 30 INJECTION, SOLUTION INTRAMUSCULAR; INTRAVENOUS at 12:09

## 2022-09-17 RX ADMIN — QUETIAPINE FUMARATE 200 MG: 100 TABLET ORAL at 09:09

## 2022-09-17 RX ADMIN — FLUOXETINE 60 MG: 20 CAPSULE ORAL at 09:09

## 2022-09-17 RX ADMIN — CEFAZOLIN SODIUM 2 G: 2 SOLUTION INTRAVENOUS at 05:09

## 2022-09-17 RX ADMIN — TIZANIDINE 4 MG: 4 TABLET ORAL at 09:09

## 2022-09-17 RX ADMIN — SUCRALFATE 1 G: 1 SUSPENSION ORAL at 05:09

## 2022-09-17 RX ADMIN — SODIUM CHLORIDE: 0.9 INJECTION, SOLUTION INTRAVENOUS at 04:09

## 2022-09-17 RX ADMIN — SUCRALFATE 1 G: 1 SUSPENSION ORAL at 11:09

## 2022-09-17 RX ADMIN — KETOROLAC TROMETHAMINE 30 MG: 30 INJECTION, SOLUTION INTRAMUSCULAR; INTRAVENOUS at 11:09

## 2022-09-17 RX ADMIN — HYDROCODONE BITARTRATE AND ACETAMINOPHEN 1 TABLET: 7.5; 325 TABLET ORAL at 03:09

## 2022-09-17 RX ADMIN — CEFAZOLIN SODIUM 2 G: 2 SOLUTION INTRAVENOUS at 09:09

## 2022-09-17 RX ADMIN — SODIUM CHLORIDE: 0.9 INJECTION, SOLUTION INTRAVENOUS at 06:09

## 2022-09-17 RX ADMIN — ALUMINUM HYDROXIDE, MAGNESIUM HYDROXIDE, AND DIMETHICONE 30 ML: 200; 20; 200 SUSPENSION ORAL at 11:09

## 2022-09-17 RX ADMIN — LIDOCAINE 1 PATCH: 50 PATCH CUTANEOUS at 09:09

## 2022-09-17 RX ADMIN — ATORVASTATIN CALCIUM 80 MG: 40 TABLET, FILM COATED ORAL at 09:09

## 2022-09-17 RX ADMIN — HYDROCODONE BITARTRATE AND ACETAMINOPHEN 1 TABLET: 7.5; 325 TABLET ORAL at 05:09

## 2022-09-17 RX ADMIN — CEFAZOLIN SODIUM 2 G: 2 SOLUTION INTRAVENOUS at 12:09

## 2022-09-17 RX ADMIN — KETOROLAC TROMETHAMINE 30 MG: 30 INJECTION, SOLUTION INTRAMUSCULAR; INTRAVENOUS at 05:09

## 2022-09-17 RX ADMIN — LEVOTHYROXINE SODIUM 137 MCG: 25 TABLET ORAL at 05:09

## 2022-09-17 RX ADMIN — HYDROCODONE BITARTRATE AND ACETAMINOPHEN 1 TABLET: 7.5; 325 TABLET ORAL at 11:09

## 2022-09-17 RX ADMIN — TRAZODONE HYDROCHLORIDE 300 MG: 100 TABLET ORAL at 09:09

## 2022-09-17 NOTE — PT/OT/SLP EVAL
Physical Therapy Evaluation    Patient Name:  Tasha Hawley   MRN:  [id number]    Recommendations:     Discharge Recommendations:  home health PT   Discharge Equipment Recommendations: walker, rolling   Barriers to discharge: None    Assessment:     Tasha Hawley is a 67 y.o. female admitted with a medical diagnosis of <principal problem not specified>.  She presents with the following impairments/functional limitations:  weakness, impaired endurance, impaired functional mobility, impaired balance, gait instability, decreased lower extremity function, decreased safety awareness, pain, edema, impaired skin. Pt was agreeable to PT session today. Pt was max A for bed mobility. Pt attempted sit to stand transfer, but was unable to complete due to pain.     Rehab Prognosis: Fair; patient would benefit from acute skilled PT services to address these deficits and reach maximum level of function.    Recent Surgery: Procedure(s) (LRB):  EXCISION ARTHRITIC BONE, BILATERAL FOOT (Bilateral) 1 Day Post-Op    Plan:     During this hospitalization, patient to be seen 5 x/week to address the identified rehab impairments via gait training, therapeutic activities, therapeutic exercises and progress toward the following goals:    Plan of Care Expires:  10/11/22    Subjective     Chief Complaint: My legs  Patient/Family Comments/goals: To decrease my pain  Pain/Comfort:  Pain Rating 1: 10/10  Location - Side 1: Bilateral  Location 1: foot  Pain Addressed 1: Nurse notified    Patients cultural, spiritual, Zoroastrian conflicts given the current situation: no    Living Environment:  Lives with  in single story home .  Prior to admission, patients level of function was INDP.  Equipment used at home: rollator, walker, rolling.  DME owned (not currently used): none.  Upon discharge, patient will have assistance from rolling walker.    Objective:     Communicated with nurse Saenz prior to session.  Patient found HOB elevated  with bed alarm, peripheral IV  upon PT entry to room.    General Precautions: Standard, fall   Orthopedic Precautions: (BLE WBAT on HEELS)   Braces: N/A  Respiratory Status: Room air    Exams:  RLE ROM: WFL except decreased WB  RLE Strength: WFL except decreased WB  LLE ROM: WFL except decreased WB  LLE Strength: WFL except decreased WB    Functional Mobility:  Bed Mobility:     Supine to Sit: maximal assistance  Sit to Supine: maximal assistance  Transfers:     Sit to Stand:  maximal assistance with rolling walker    Therapeutic Activities and Exercises:   None     AM-PAC 6 CLICK MOBILITY  Total Score:14     Patient left HOB elevated with all lines intact, call button in reach, and nurse present.    GOALS:   Multidisciplinary Problems       Physical Therapy Goals          Problem: Physical Therapy    Goal Priority Disciplines Outcome Goal Variances Interventions   Physical Therapy Goal     PT, PT/OT Ongoing, Progressing     Description: Goals to be met by: 10/11/2022    Patient will increase functional independence with mobility by performing:  Supine to sit with MInimal Assistance  Sit to supine with MInimal Assistance  Sit to stand transfer with Moderate Assistance  Gait  x 50 feet with Maximum Assistance using Rolling Walker.                          History:     Past Medical History:   Diagnosis Date    Anticoagulant long-term use     Anxiety     Arthritis     Bilateral lower extremity edema     severe chronic    Carotid artery occlusion     Cataract     CHF (congestive heart failure)     Coronary artery disease     subtotalled LAD with collateral    Depression     Fever blister     Hypothyroid     Iron deficiency anemia     Lumbar radiculopathy     with chronic pain    Ocular migraine     Renal disorder     Sleep apnea     cpap       Past Surgical History:   Procedure Laterality Date    ADENOIDECTOMY      BACK SURGERY      x 12    CARDIAC CATHETERIZATION  2016    subtotalled LAD with right to left collaterals     CATARACT EXTRACTION W/  INTRAOCULAR LENS IMPLANT Left     Dr Coleman     CYSTOSCOPIC LITHOLAPAXY N/A 6/27/2019    Procedure: CYSTOLITHOLAPAXY;  Surgeon: Shireen Mayo MD;  Location: [facility] OR 2ND FLR;  Service: Urology;  Laterality: N/A;    CYSTOSCOPIC LITHOLAPAXY N/A 9/3/2019    Procedure: CYSTOLITHOLAPAXY;  Surgeon: Shireen Mayo MD;  Location: [facility] OR 2ND FLR;  Service: Urology;  Laterality: N/A;    CYSTOSCOPY N/A 7/13/2021    Procedure: CYSTOSCOPY;  Surgeon: Shireen Myao MD;  Location: [facility] OR 1ST FLR;  Service: Urology;  Laterality: N/A;    CYSTOSCOPY  11/16/2021    Procedure: CYSTOSCOPY;  Surgeon: Shireen Mayo MD;  Location: [facility] OR 1ST FLR;  Service: Urology;;    CYSTOSCOPY  7/19/2022    Procedure: CYSTOSCOPY;  Surgeon: Shireen Mayo MD;  Location: [facility] OR 1ST FLR;  Service: Urology;;    CYSTOSCOPY WITH INJECTION OF PERIURETHRAL BULKING AGENT  7/19/2022    Procedure: CYSTOSCOPY, WITH PERIURETHRAL BULKING AGENT INJECTION-MACROPLASTIQUE;  Surgeon: Shireen Mayo MD;  Location: [facility] OR 1ST FLR;  Service: Urology;;    ESOPHAGOGASTRODUODENOSCOPY N/A 5/23/2018    Procedure: ESOPHAGOGASTRODUODENOSCOPY (EGD);  Surgeon: Prince Vance MD;  Location: [facility] (4TH FLR);  Service: Endoscopy;  Laterality: N/A;  r/s 'd per Dr. Vance due to family emergency- ER    HYSTERECTOMY  1975    endometriosis    INJECTION OF BOTULINUM TOXIN TYPE A  7/13/2021    Procedure: INJECTION, BOTULINUM TOXIN, 200units;  Surgeon: Shireen Mayo MD;  Location: [facility] OR 1ST FLR;  Service: Urology;;    INJECTION OF BOTULINUM TOXIN TYPE A  11/16/2021    Procedure: INJECTION, BOTULINUM TOXIN, 200units;  Surgeon: Shireen Mayo MD;  Location: [facility] OR 1ST FLR;  Service: Urology;;    INJECTION OF BOTULINUM TOXIN TYPE A  7/19/2022    Procedure: INJECTION, BOTULINUM TOXIN, 300 units ;  Surgeon: Shireen Mayo MD;  Location: [facility] OR [address] [place] [address];  Service: Urology;;    pain pump placement      SQ Dilaudid Pump managed  by Dr. Hillman, Pain Management    REPLACEMENT OF CATHETER N/A 10/31/2019    Procedure: REPLACEMENT, CATHETER-SUPRAPUBIC;  Surgeon: Shireen Mayo MD;  Location: [facility] OR [address] [place] [address];  Service: Urology;  Laterality: N/A;    SPINAL CORD STIMULATOR REMOVAL      before Larissa    SPINE SURGERY  5-13-13    CERVICAL FUSION    TONSILLECTOMY         Time Tracking:     PT Received On: 09/17/22  PT Start Time: 1233     PT Stop Time: 1250  PT Total Time (min): 17 min     Billable Minutes: Evaluation 17 09/17/2022

## 2022-09-17 NOTE — PLAN OF CARE
The sw spoke to the pt and her spouse Dangelo Hawley [phone] via phone in her room b/c she was about to d/c but it was canceled until tomorrow. The pt's spouse informed the sw he wanted the pt to go to snf or IPR at d/c. The sw informed him what therapy recs were and inquired about who assist the pt at home. He states he assists the pt with her adl's as needed and she has a rw,ttb and w/c at home. The pt doesn't drive anymore so he transports the pt to her dr glory's and errands. He states it will be difficult for him to care for the pt with both of her feet being non weigh bearing. The sw inquired about other family,friends or neighbors that may be able to assist after d/c. He states they do have family and friends they can ask but he would rather not ask them. The sw reached out to Dr. Mcguire(answering service) and the pt's nurse to inform them of this info. The sw was later called by the pt's nurse who also reached out to the dr that the pt and his wife changed their minds and are now agreeable to hh. The pt's spouse will transport her home at d/c. The coupe didn't have a preference for hh so the sw spoke to Bryant [phone] at [facility] and he states they can accept the pt. The sw informed them the pt is not ready for d/c today,he states they will be able to admit when she's ready. The sw left her name and contact info with the pt and her spouse and encouraged them to call if they have nay further questions or concerns.        09/17/22 6633   Discharge Planning   Assessment Type Discharge Planning Assessment   Resource/Environmental Concerns none   Support Systems Spouse/significant other   Equipment Currently Used at Home walker, rolling;bath bench;wheelchair   Current Living Arrangements home/apartment/condo   Patient/Family Anticipates Transition to home with family   Patient/Family Anticipated Services at Transition home health care   DME Needed Upon Discharge  none  (recs are a rw  but the pt already has one)   Discharge Plan A Home Health   Discharge Plan B Other  (TBD)

## 2022-09-17 NOTE — PLAN OF CARE
A&Ox4. C/o pain due to surgery, 9/10. PRN pain medications administered as per order, pain does decrease to 7/10. Chencho feet have dressing, clean dry and intact. Pt denies numbness and/or tingling to chencho feet and toes. When asked to move toes, pt is able to slightly. Suprapubic cath is chronic, cath care preformed. Safety maintained.           Problem: Adult Inpatient Plan of Care  Goal: Plan of Care Review  Outcome: Ongoing, Progressing  Goal: Patient-Specific Goal (Individualized)  Outcome: Ongoing, Progressing  Goal: Absence of Hospital-Acquired Illness or Injury  Outcome: Ongoing, Progressing

## 2022-09-17 NOTE — PT/OT/SLP PROGRESS
Physical Therapy      Patient Name:  Tasha Hawley   MRN:  [id number]    Patient not seen today secondary to Patient unwilling to participate, Patient fatigue, Pain. Will follow-up 9/18/2022.

## 2022-09-17 NOTE — PROGRESS NOTES
The sw faxed the pt's info to [facility] via [employer] and informed Bryant the pt will probably d/c tomorrow. He states they can accept the pt and will admit her at d/c.

## 2022-09-17 NOTE — CONSULTS
"  [facility] Surg  Adult Nutrition  Consult Note    SUMMARY     Recommendations    Recommendation:  1. Consult SLP to eval swallowing.   2. Advance diet as tolerated.   3. Monitor weight/labs.   4. RD to follow to monitor po intake    Goals:   Diet will be advanced by RD follow up  Nutrition Goal Status: new  Communication of RD Recs: other (comment) (POC)    Assessment and Plan  Nutrition Problem  Inadequate energy intake    Related to (etiology):   Dysphagia, s/p surgery    Signs and Symptoms (as evidenced by):   Clear Liquid diet    Interventions:  Collaboration with other providers    Nutrition Diagnosis Status:   New      Malnutrition Assessment                                       Reason for Assessment    Reason For Assessment: consult (dysphagia)  Diagnosis:  (foot surgery)  Relevant Medical History: axiety, depression, sleep apnea, arthritis, CAD, CHF, renal disorder, hysterectomy, back surgery  General Information Comments: Pt admitted for surgery. s/p bilateral foot bone spur excision. Bennett 15- B foot incisions. Consulted for dysphagia. Pt currently on Clear Liquid diet. Intake not recorded. Weight stable. Unable to assess NFPE 2/2 remote coverage.  Nutrition Discharge Planning: d/c diet to be determined    Nutrition Risk Screen    Nutrition Risk Screen: dysphagia or difficulty swallowing    Nutrition/Diet History    Food Preferences: no Spiritism or cultural food prefs identified  Spiritual, Cultural Beliefs, Cheondoism Practices, Values that Affect Care: no  Factors Affecting Nutritional Intake: difficulty/impaired swallowing    Anthropometrics    Temp: 98.4 °F (36.9 °C)  Height Method: Stated  Height: 5' 4" (162.6 cm)  Height (inches): 64 in  Weight Method: Bed Scale  Weight: 87.5 kg (192 lb 14.4 oz)  Weight (lb): 192.9 lb  Ideal Body Weight (IBW), Female: 120 lb  % Ideal Body Weight, Female (lb): 160.75 %  BMI (Calculated): 33.1  BMI Grade: 30 - 34.9- obesity - grade I  Usual Body Weight (UBW), kg: " 87 kg (3/31/22)  % Usual Body Weight: 100.79  % Weight Change From Usual Weight: 0.57 %       Lab/Procedures/Meds    Pertinent Labs Reviewed: reviewed  Pertinent Labs Comments: Alb 3.2L  Pertinent Medications Reviewed: reviewed  Pertinent Medications Comments: lactated ringers infusion    Physical Findings/Assessment         Estimated/Assessed Needs    Weight Used For Calorie Calculations: 87.5 kg (192 lb 14.4 oz)  Energy Calorie Requirements (kcal): 1820  Energy Need Method: Dixon-St Jeor  Protein Requirements: 70g (0.8g/kg)  Weight Used For Protein Calculations: 87.5 kg (192 lb 14.4 oz)     Estimated Fluid Requirement Method: RDA Method  RDA Method (mL): 1820         Nutrition Prescription Ordered    Current Diet Order: Clear Liquid    Evaluation of Received Nutrient/Fluid Intake    I/O: 240/0  Energy Calories Required: not meeting needs  Protein Required: not meeting needs  Fluid Required: not meeting needs  Comments: LBM 9/15  % Intake of Estimated Energy Needs: Other: intake not recorded  % Meal Intake: Other: intake not recorded    Nutrition Risk  Level of Risk/Frequency of Follow-up:  (2xweekly)     Monitor and Evaluation  Food and Nutrient Intake: food and beverage intake  Food and Nutrient Adminstration: diet order  Physical Activity and Function: nutrition-related ADLs and IADLs  Anthropometric Measurements: weight  Biochemical Data, Medical Tests and Procedures: electrolyte and renal panel  Nutrition-Focused Physical Findings: overall appearance     Nutrition Follow-Up  RD Follow-up?: Yes

## 2022-09-17 NOTE — PLAN OF CARE
Recommendation:  1. Consult SLP to eval swallowing.   2. Advance diet as tolerated.   3. Monitor weight/labs.   4. RD to follow to monitor po intake    Goals:   Diet will be advanced by RD follow up  Nutrition Goal Status: new

## 2022-09-18 PROBLEM — Z78.9 KNOWN MEDICAL PROBLEMS: Status: ACTIVE | Noted: 2022-04-19

## 2022-09-18 PROCEDURE — 97530 THERAPEUTIC ACTIVITIES: CPT

## 2022-09-18 PROCEDURE — 25000003 PHARM REV CODE 250: Performed by: STUDENT IN AN ORGANIZED HEALTH CARE EDUCATION/TRAINING PROGRAM

## 2022-09-18 PROCEDURE — 25000003 PHARM REV CODE 250: Performed by: PODIATRIST

## 2022-09-18 RX ORDER — FUROSEMIDE 40 MG/1
40 TABLET ORAL DAILY
Status: DISCONTINUED | OUTPATIENT
Start: 2022-09-18 | End: 2022-09-19 | Stop reason: HOSPADM

## 2022-09-18 RX ORDER — SENNOSIDES 8.6 MG/1
2 TABLET ORAL 2 TIMES DAILY
Status: DISCONTINUED | OUTPATIENT
Start: 2022-09-18 | End: 2022-09-19 | Stop reason: HOSPADM

## 2022-09-18 RX ORDER — MAG HYDROX/ALUMINUM HYD/SIMETH 200-200-20
30 SUSPENSION, ORAL (FINAL DOSE FORM) ORAL
Status: DISCONTINUED | OUTPATIENT
Start: 2022-09-18 | End: 2022-09-19 | Stop reason: HOSPADM

## 2022-09-18 RX ORDER — TIZANIDINE 4 MG/1
4 TABLET ORAL 2 TIMES DAILY
Status: DISCONTINUED | OUTPATIENT
Start: 2022-09-18 | End: 2022-09-19 | Stop reason: HOSPADM

## 2022-09-18 RX ORDER — ASPIRIN 81 MG/1
81 TABLET ORAL DAILY
Status: DISCONTINUED | OUTPATIENT
Start: 2022-09-18 | End: 2022-09-19 | Stop reason: HOSPADM

## 2022-09-18 RX ORDER — SUCRALFATE 1 G/10ML
1 SUSPENSION ORAL EVERY 6 HOURS
Status: DISCONTINUED | OUTPATIENT
Start: 2022-09-18 | End: 2022-09-19 | Stop reason: HOSPADM

## 2022-09-18 RX ADMIN — TIZANIDINE 4 MG: 4 TABLET ORAL at 08:09

## 2022-09-18 RX ADMIN — HYDROCODONE BITARTRATE AND ACETAMINOPHEN 1 TABLET: 7.5; 325 TABLET ORAL at 06:09

## 2022-09-18 RX ADMIN — FUROSEMIDE 40 MG: 40 TABLET ORAL at 08:09

## 2022-09-18 RX ADMIN — SENNOSIDES 2 TABLET: 8.6 TABLET, FILM COATED ORAL at 08:09

## 2022-09-18 RX ADMIN — LEVOTHYROXINE SODIUM 137 MCG: 25 TABLET ORAL at 05:09

## 2022-09-18 RX ADMIN — SODIUM CHLORIDE: 0.9 INJECTION, SOLUTION INTRAVENOUS at 05:09

## 2022-09-18 RX ADMIN — HYDROCODONE BITARTRATE AND ACETAMINOPHEN 1 TABLET: 7.5; 325 TABLET ORAL at 09:09

## 2022-09-18 RX ADMIN — ATORVASTATIN CALCIUM 80 MG: 40 TABLET, FILM COATED ORAL at 08:09

## 2022-09-18 RX ADMIN — HYDROCODONE BITARTRATE AND ACETAMINOPHEN 1 TABLET: 7.5; 325 TABLET ORAL at 03:09

## 2022-09-18 RX ADMIN — FLUOXETINE 60 MG: 20 CAPSULE ORAL at 08:09

## 2022-09-18 RX ADMIN — QUETIAPINE FUMARATE 200 MG: 100 TABLET ORAL at 08:09

## 2022-09-18 RX ADMIN — ASPIRIN 81 MG: 81 TABLET, COATED ORAL at 08:09

## 2022-09-18 NOTE — PLAN OF CARE
Problem: Physical Therapy  Goal: Physical Therapy Goal  Description: Goals to be met by: 10/11/2022    Patient will increase functional independence with mobility by performing:  Supine to sit with MInimal Assistance  Sit to supine with MInimal Assistance  Sit to stand transfer with Moderate Assistance  Gait  x 50 feet with Maximum Assistance using Rolling Walker.       REVISED GOALS (9/18/22):   To Be Met By: 10/18/22  1.  Txfs sup>sit CGA/min assist  2.  Txfs sit>sup CGA/min assist  3.  Seated scoot SBA clearing bed surface w B heel wt bearing B directions CGA  4.  Sit EOB or OOB chair/wheelchair for > 15 min w SBA  5.  /caregiver and pt to return demo understanding of safety education for txfs bed<>w/c or BSC using B heel wt bearing with or without sliding board use and pt to perf w min/mod assist.      9/18/2022 1517 by Rebecca Zarco PT  Outcome: Ongoing, Progressing    Pt cooperative with therapy participation though c/o significant pain in BLE and B feet throughout.  Pt demo decreased safety awareness and understanding of parts of education provided.  /caregiver and pt both provided with extensive safety education for mobility performance and DME use.  Prior to surgery pt was ambulating to restroom and mod ind with AD in the home but now requires max assist with mobility for sit<>stand and  bed<>w/c txfs, and demo unsuccessful Dep sit<>stand w B heel wt bearing  mult trials. Caregiver reporting w/c does not fit throughout home doors/lennon along with multiple other safety concerns that are reported during this session.  Rec SNF for continued PT and OT services to improve pt's endurance, strength, and independence levels with functional mobility and ADL to achieve maximum potential for increased safety upon return to home setting.  If pt does go home instead, at this time rec ordering sliding board and BSC w removable arm rest along with HH PT/OT.

## 2022-09-18 NOTE — NURSING
On yesterday the patient verbalized that she wanted to be discharged to a rehab facility; later that same day, the patient changed her mind and stated that she wanted to be discharged home; today the patient stated that she wanted to be discharged home; Dr. Mcguire went to bedside today and discussed discharge with the patient.

## 2022-09-18 NOTE — DISCHARGE SUMMARY
Subjective: Patient was admitted to extended stay following bilateral Charcot joint plantar exostosis excision secondary to chronic pain and recurrent ulcerations.    Patient's procedure was relatively uneventful.  Her postoperative course was eventful due to pain management and inability to transfer catheter physical therapy was consulted.      The patient also upon discharge had of hypertension crisis.  She was therefore admitted for further evaluation treatment and internal Medicine was consulted.      Objective:  Patient was assessed on 09/18 this a.m. for discharge.  Patient stated that she wanted to go home.    Patient's vitals were stable, dressings were intact in patient's pain was controlled at this time.    Assessment:  Patient will be discharged today following bilateral Charcot joint arthropathy with recurrent ulcerations and excision of bony exostosis of both feet.    Plan:  Patient will be discharged today and will be followed up in my office postoperatively.      Patient will have home health has already been established for continue evaluation and treatment of other comorbidities    In addition she will have physical therapy to help with the range of motion exercises and strengthening of both lower and upper extremities.

## 2022-09-18 NOTE — PLAN OF CARE
SW met with pt and pts  via [employer] to discuss dc planning. Pt and pts  expressed that they will like SNF placement at this time. SW sent SNF referrals via [facility]. SW expressed that assigned cm tomorrow will follow up to discuss in further detail.     Pts bedside nurse expressed he will communicate with MD of pts request/concerns.     Future Appointments   Date Time Provider Department Center   9/20/2022 10:40 AM Mesfin Hodges II, MD [facility]          09/18/22 1704   Post-Acute Status   Post-Acute Authorization Placement   Post-Acute Placement Status Referrals Sent

## 2022-09-18 NOTE — ASSESSMENT & PLAN NOTE
-c/w home fluoxetine    
-c/w with pain management  -defer to PT and podiatry assessment  
-may give additional breakthrough pain,   -defer to pain specialist longterm, who she sees outpatient  
-may give additional doses of IV dilaudid, in addition to her current pump, to help with her current episodes. If it is possible to also increased her dilaudid pump dose for the next day or so, this can be done for severe pain  -c/w hydrocodone 7.5mg q6 prn.     
-patient may be restarted on all her home meds following her recent surgery.   -additional pain management can be supplemented with oral narcotics,       
C/w home trazodone and quetiapine,     
Patient likely needs an increase in thyroid medication, although this can be done outpatient. Her most recent TSH was 11.054. Due to her current surgery and likely alteration of TSH hormones, she should get a repeat outpatient in the next month.  
[email or web address]

## 2022-09-18 NOTE — HOSPITAL COURSE
Patient presented for surgery without any complication. Patient is planned to discharge to facility to continue rehab.

## 2022-09-18 NOTE — CONSULTS
Subjective:  Patient is seen per day for postoperative day 2 from excision of bony exostosis of the plantar aspect of both feet due to recurrent ulcerations and chronic pain.  Patient has had excision of the prominent mid tarsal area of the tarsal metatarsal joints on the lateral aspect of the.  Surgery went quite well.  However there was some complications regarding her discharge from  09/17/2018.    Patient's blood pressure was in hypertensive dangers of areas so it was decided to extend patient's stay.  Hospital was was consulted for overall medical management.    Patient was seen in room and stated that she had slept all night and is slated to go home today.    Objective:  Patient is afebrile and vitals was stable.  Dressings are intact.  Patient has reduction of cellulitis and edema bilateral legs.  Patient's capillary filling time is within normal limits in all digits bilaterally.     Assessment:  Postoperative day 2 from excision of Charcot joint arthropathy and chronic bony exostosis bilateral feet due to recurrent ulcerations and chronic pain.    Plan:  1. We will discharge patient today.  2. .  Patient will have home health that has already been established through the [facility]..    3. Patient will have outpatient home physical therapy through home health that is already been established.  This is to aid and muscle strengthening, range of motion and transfer.    4. Patient to be follow up in my office postoperatively.

## 2022-09-18 NOTE — SUBJECTIVE & OBJECTIVE
Past Medical History:   Diagnosis Date    Anticoagulant long-term use     Anxiety     Arthritis     Bilateral lower extremity edema     severe chronic    Carotid artery occlusion     Cataract     CHF (congestive heart failure)     Coronary artery disease     subtotalled LAD with collateral    Depression     Fever blister     Hypothyroid     Iron deficiency anemia     Lumbar radiculopathy     with chronic pain    Ocular migraine     Renal disorder     Sleep apnea     cpap       Past Surgical History:   Procedure Laterality Date    ADENOIDECTOMY      BACK SURGERY      x 12    CARDIAC CATHETERIZATION  2016    subtotalled LAD with right to left collaterals    CATARACT EXTRACTION W/  INTRAOCULAR LENS IMPLANT Left     Dr Coleman     CYSTOSCOPIC LITHOLAPAXY N/A 6/27/2019    Procedure: CYSTOLITHOLAPAXY;  Surgeon: Shireen Mayo MD;  Location: [facility] OR 2ND FLR;  Service: Urology;  Laterality: N/A;    CYSTOSCOPIC LITHOLAPAXY N/A 9/3/2019    Procedure: CYSTOLITHOLAPAXY;  Surgeon: Shireen Mayo MD;  Location: [facility] OR 2ND FLR;  Service: Urology;  Laterality: N/A;    CYSTOSCOPY N/A 7/13/2021    Procedure: CYSTOSCOPY;  Surgeon: Shireen Mayo MD;  Location: [facility] OR 1ST FLR;  Service: Urology;  Laterality: N/A;    CYSTOSCOPY  11/16/2021    Procedure: CYSTOSCOPY;  Surgeon: Shireen Mayo MD;  Location: [facility] OR 1ST FLR;  Service: Urology;;    CYSTOSCOPY  7/19/2022    Procedure: CYSTOSCOPY;  Surgeon: Shireen Mayo MD;  Location: [facility] OR 1ST FLR;  Service: Urology;;    CYSTOSCOPY WITH INJECTION OF PERIURETHRAL BULKING AGENT  7/19/2022    Procedure: CYSTOSCOPY, WITH PERIURETHRAL BULKING AGENT INJECTION-MACROPLASTIQUE;  Surgeon: Shireen Mayo MD;  Location: [facility] OR 1ST FLR;  Service: Urology;;    ESOPHAGOGASTRODUODENOSCOPY N/A 5/23/2018    Procedure: ESOPHAGOGASTRODUODENOSCOPY (EGD);  Surgeon: Prince Vance MD;  Location: [facility] (4TH FLR);  Service: Endoscopy;  Laterality: N/A;  r/s 'd per Dr. Vance due to  family emergency- ER    HYSTERECTOMY  1975    endometriosis    INJECTION OF BOTULINUM TOXIN TYPE A  7/13/2021    Procedure: INJECTION, BOTULINUM TOXIN, 200units;  Surgeon: Shireen Mayo MD;  Location: [facility] OR [address] [place] [address];  Service: Urology;;    INJECTION OF BOTULINUM TOXIN TYPE A  11/16/2021    Procedure: INJECTION, BOTULINUM TOXIN, 200units;  Surgeon: Shireen Mayo MD;  Location: [facility] OR [address] [place] [address];  Service: Urology;;    INJECTION OF BOTULINUM TOXIN TYPE A  7/19/2022    Procedure: INJECTION, BOTULINUM TOXIN, 300 units ;  Surgeon: Shireen Mayo MD;  Location: [facility] OR [address] [place] [address];  Service: Urology;;    pain pump placement      SQ Dilaudid Pump managed by Dr. Hillman, Pain Management    REPLACEMENT OF CATHETER N/A 10/31/2019    Procedure: REPLACEMENT, CATHETER-SUPRAPUBIC;  Surgeon: Shireen Mayo MD;  Location: [facility] OR [address] [place] [address];  Service: Urology;  Laterality: N/A;    SPINAL CORD STIMULATOR REMOVAL      before [place]    SPINE SURGERY  5-13-13    CERVICAL FUSION    TONSILLECTOMY         Review of patient's allergies indicates:   Allergen Reactions    (d)-limonene flavor      Other reaction(s): difficult intubation  Other reaction(s): Difficulty breathing    Bactrim [sulfamethoxazole-trimethoprim] Anaphylaxis    Benadryl [diphenhydramine hcl] Shortness Of Breath    Fentanyl Itching, Nausea And Vomiting and Swelling             Imitrex [sumatriptan succinate] Shortness Of Breath    Percocet [oxycodone-acetaminophen] Shortness Of Breath    Topamax [topiramate] Shortness Of Breath    Vancomycin Shortness Of Breath     Rash    Butorphanol tartrate     Darvocet a500 [propoxyphene n-acetaminophen]      Other reaction(s): Difficulty breathing    White petrolatum-zinc     Zinc oxide-white petrolatum      Other reaction(s): Difficulty breathing    Latex, natural rubber Itching and Rash    Phenytoin Rash and Other (See Comments)     Trouble breathing       No current facility-administered medications on file prior to  encounter.     Current Outpatient Medications on File Prior to Encounter   Medication Sig    aspirin (ECOTRIN) 81 MG EC tablet Take 1 tablet (81 mg total) by mouth once daily.    atorvastatin (LIPITOR) 80 MG tablet TAKE ONE TABLET BY MOUTH EVERY DAY (Patient taking differently: Take by mouth every evening.)    butalbital-acetaminophen-caffeine -40 mg (FIORICET, ESGIC) -40 mg per tablet Take 1 tablet by mouth daily as needed.    furosemide (LASIX) 40 MG tablet Take 1 tablet (40 mg total) by mouth once daily.    HYDROcodone-acetaminophen (NORCO)  mg per tablet Take by mouth every 24 hours as needed.    intrathecal pain pump compound Hydromorphone (7.5 mg/mL) infusion at 3.6799 mg/day (0.1533 mg/hr) out of a total reservoir volume of 37.3 mL  Pump filled every 2 months    levothyroxine (SYNTHROID) 137 MCG Tab tablet Take 1 tablet (137 mcg total) by mouth before breakfast.    pantoprazole (PROTONIX) 40 MG tablet TAKE ONE TABLET BY MOUTH EVERY DAY (Patient taking differently: Take by mouth every morning.)    potassium chloride SA (K-DUR,KLOR-CON) 20 MEQ tablet Take 1 tablet (20 mEq total) by mouth 2 (two) times daily.    senna (SENNA LAX) 8.6 mg tablet Take 2 tablets by mouth 2 (two) times daily.    tiZANidine (ZANAFLEX) 4 MG tablet Take 4 mg by mouth 2 (two) times daily.    [DISCONTINUED] lidocaine (LIDODERM) 5 % 2 patches daily as needed. Lower back    acyclovir 5% (ZOVIRAX) 5 % ointment Apply topically 5 (five) times daily.    nitroGLYCERIN (NITROSTAT) 0.4 MG SL tablet Place 1 tablet (0.4 mg total) under the tongue every 5 (five) minutes as needed for Chest pain.    promethazine (PHENERGAN) 25 MG tablet Take 25 mg by mouth every 6 (six) hours as needed for Nausea.    [DISCONTINUED] ondansetron (ZOFRAN-ODT) 4 MG TbDL Take by mouth.    [DISCONTINUED] torsemide (DEMADEX) 100 MG Tab TAKE ONE TABLET BY MOUTH EVERY DAY     Family History       Problem Relation (Age of Onset)    Cancer Mother (55), Father     Cirrhosis Paternal Aunt, Paternal Uncle    Colon cancer Maternal Uncle    Esophageal cancer Father    Heart disease Maternal Uncle    Liver disease Paternal Aunt, Paternal Uncle    No Known Problems Brother, Brother, Sister, Maternal Aunt, Maternal Grandfather, Paternal Grandmother, Paternal Grandfather    Parkinsonism Maternal Grandmother    Tremor Maternal Grandmother          Tobacco Use    Smoking status: Never    Smokeless tobacco: Never   Substance and Sexual Activity    Alcohol use: Never    Drug use: No    Sexual activity: Never     Partners: Male     Review of Systems   Constitutional:  Negative for activity change, appetite change and chills.   HENT:  Negative for dental problem, drooling and ear pain.    Eyes:  Negative for pain and itching.   Respiratory:  Negative for cough and choking.    Cardiovascular:  Negative for palpitations and leg swelling.   Gastrointestinal:  Negative for blood in stool and constipation.   Genitourinary:  Negative for decreased urine volume, genital sores and pelvic pain.   Musculoskeletal:  Negative for back pain and gait problem.   Skin:  Negative for pallor and rash.   Neurological:  Negative for seizures and numbness.   Psychiatric/Behavioral:  Negative for decreased concentration. The patient is nervous/anxious.    Objective:     Vital Signs (Most Recent):  Temp: 97.7 °F (36.5 °C) (09/17/22 2258)  Pulse: (!) 57 (09/17/22 2258)  Resp: 18 (09/17/22 2258)  BP: (!) 94/53 (09/17/22 2258)  SpO2: (!) 91 % (09/17/22 2258)   Vital Signs (24h Range):  Temp:  [97.7 °F (36.5 °C)-98.4 °F (36.9 °C)] 97.7 °F (36.5 °C)  Pulse:  [56-73] 57  Resp:  [16-18] 18  SpO2:  [91 %-95 %] 91 %  BP: ()/(42-55) 94/53     Weight: 91.5 kg (201 lb 11.5 oz)  Body mass index is 34.63 kg/m².    Physical Exam  Constitutional:       General: She is not in acute distress.     Appearance: She is ill-appearing.   HENT:      Right Ear: There is no impacted cerumen.      Left Ear: There is no impacted  cerumen.      Nose: No congestion or rhinorrhea.      Mouth/Throat:      Pharynx: No oropharyngeal exudate.   Eyes:      General:         Left eye: No discharge.   Neck:      Vascular: No carotid bruit.   Cardiovascular:      Heart sounds: No murmur heard.    No friction rub.   Pulmonary:      Breath sounds: No wheezing, rhonchi or rales.   Abdominal:      Tenderness: There is no abdominal tenderness.      Hernia: No hernia is present.   Musculoskeletal:         General: Tenderness present. No signs of injury.      Cervical back: No tenderness.   Skin:     Coloration: Skin is not pale.      Findings: No bruising, erythema or lesion.   Psychiatric:         Behavior: Behavior normal.       Significant Labs: All pertinent labs within the past 24 hours have been reviewed.  CBC: No results for input(s): WBC, HGB, HCT, PLT in the last 48 hours.  CMP: No results for input(s): NA, K, CL, CO2, GLU, BUN, CREATININE, CALCIUM, PROT, ALBUMIN, BILITOT, ALKPHOS, AST, ALT, ANIONGAP, EGFRNONAA in the last 48 hours.    Invalid input(s): ESTGFAFRICA  Cardiac Markers: No results for input(s): CKMB, MYOGLOBIN, BNP, TROPISTAT in the last 48 hours.  Lactic Acid: No results for input(s): LACTATE in the last 48 hours.  Lipase: No results for input(s): LIPASE in the last 48 hours.    Significant Imaging: I have reviewed all pertinent imaging results/findings within the past 24 hours.

## 2022-09-18 NOTE — PLAN OF CARE
09/18/22 1113   Final Note   Assessment Type Discharge Planning Assessment   Anticipated Discharge Disposition Home-Health   What phone number can be called within the next 1-3 days to see how you are doing after discharge? [phone]   Hospital Resources/Appts/Education Provided Appointments scheduled by Navigator/Coordinator   Post-Acute Status   Post-Acute Authorization Home Health   Home Health Status   (waiting on physician orders)   CM contacted and spoke w/ Ms. Hawley regarding discharge orders.  Pt is aware of Home Health w/ [facility].  Physician order placed.  Instructed the HH would be in contact with her tomorrow.   at bedside and will transport home.

## 2022-09-18 NOTE — HPI
Tasha Hawley is a 67yr with a PMH of anxiety, CHF, depression, lumbar radiculopathy with chronic pain, who presents for planned excision of bony prominence of the tarsometatarsal joints plantar aspect of both feet by podiatry, in an effort to prevent further formation of ulcerations.     Medicine was consulted for general medical management of patient's chronic medical problems, including her chronic pain.

## 2022-09-18 NOTE — PT/OT/SLP PROGRESS
Physical Therapy Treatment    Patient Name:  Tasha Hawley   MRN:  [id number]    Recommendations:     Discharge Recommendations:  nursing facility, skilled   Discharge Equipment Recommendations: bedside commode, slide board, other (see comments) ([facility] [place] [facility] with removable arm rests)   Barriers to discharge: Pt requires significantly more assistance than prior to admit and /caregiver is supportive but w limited physical capacity for mod/max assist levels of care    Assessment:     Tasha Hawley is a 67 y.o. female admitted with a medical diagnosis of <principal problem not specified>.  She presents with the following impairments/functional limitations:  weakness, impaired endurance, impaired sensation, impaired self care skills, impaired functional mobility, impaired cognition, impaired balance, gait instability, decreased upper extremity function, decreased lower extremity function, decreased safety awareness, pain, edema, impaired skin, decreased ROM, impaired cardiopulmonary response to activity, orthopedic precautions . Pt cooperative with therapy participation though c/o significant pain in BLE and B feet throughout.  Pt demo decreased safety awareness and understanding of parts of education provided.  /caregiver and pt both provided with extensive safety education for mobility performance and DME use.  Prior to surgery pt was ambulating to restroom and mod ind with AD in the home but now requires max assist with mobility for sit<>stand and  bed<>w/c txfs, and demo unsuccessful Dep sit<>stand w B heel wt bearing  mult trials. Caregiver reporting w/c does not fit throughout home doors/lennon along with multiple other safety concerns that are reported during this session.  Rec SNF for continued PT and OT services to improve pt's endurance, strength, and independence levels with functional mobility and ADL to achieve maximum potential for increased safety upon return to home setting.  If pt does go home  instead, at this time rec ordering sliding board and BSC w removable arm rest along with HH PT/OT.     Rehab Prognosis: Fair; patient would benefit from acute skilled PT services to address these deficits and reach maximum level of function.    Recent Surgery: Procedure(s) (LRB):  EXCISION ARTHRITIC BONE, BILATERAL FOOT (Bilateral) 2 Days Post-Op    Plan:     During this hospitalization, patient to be seen 5 x/week to address the identified rehab impairments via gait training, therapeutic activities, therapeutic exercises, neuromuscular re-education, wheelchair management/training and progress toward the following goals:    Plan of Care Expires:  10/18/22    Subjective     Chief Complaint: c/o significant pain in BLE - legs and B feet - B sx mid plantar surface feet  Patient/Family Comments/goals: PLOF - want to go home but  reporting unable to care for pt at her current level of needs and home safety and DME issues as well - pt and family requesting SNF near their home to improve ind prior to return home  Pain/Comfort:  Pain Rating 1: 1/10  Location - Side 1: Bilateral  Location - Orientation 1: generalized  Location 1: other (see comments) (BLE legs and feet - primary surgical site B feet)  Pain Addressed 1: Reposition, Distraction, Cessation of Activity, Nurse notified  Pain Rating Post-Intervention 1: other (see comments) (no numeric value provided - but decreased c/o once returned to resting postion sup after activity completed)      Objective:       Pt seen for 2 sessions this day  [id number] and [id number]  Communicated with nsg prior to session.  Patient found supine with bed alarm, peripheral IV, meadows catheter upon PT entry to room.     General Precautions: Standard, fall, Contact   Orthopedic Precautions: (* BLE wt bearing to heels only for txfs - do not ambulate any distances)   Braces: N/A  Respiratory Status: Room air     Functional Mobility:  Bed mobility rolling max assist;  sheet and clothing  "management max assist, sup scoot dep  Txf sup>sit with significant increased time to allow pt to achieve increased independence as reportedly ind prior levels/  use of bed rails and min assist for LE at very end of transition;  txfs sit>sup with min/mod assist for LE lift to bed and sig VC and increased time encouraging pt to perf w increased independence;  attempted txfs sit<>stand with stabilized RW for support using B heel wt bearing mult trials dependent but unsuccessful with VC hand placement and LE/foot positioning ed throughout  Perf seated EOB scoot B direction reps ~1 ft each w ed for heel WB only and use of UE and wt shifting to clear bed surface min assist to SBA;  % for LE positioning and transition position to begin angle to move to chair/w/c examples and ed for txfs bed<>BSC (pt stating their current BSC has fixed arm rests and bed height is significantly higher than w/c height and cannot be adjusted) - provided extensive education verbal and demo with pt and her  w min/mod return demo of understanding at this time.      AM-PAC 6 CLICK MOBILITY  Turning over in bed (including adjusting bedclothes, sheets and blankets)?: 2  Sitting down on and standing up from a chair with arms (e.g., wheelchair, bedside commode, etc.): 1  Moving from lying on back to sitting on the side of the bed?: 3  Moving to and from a bed to a chair (including a wheelchair)?: 1  Need to walk in hospital room?: 1  Climbing 3-5 steps with a railing?: 1  Basic Mobility Total Score: 9       Therapeutic Activities and Exercises:  Pt seen for initial visit with education provided to pt and her  via phone regarding their plans, if any possible support from family/friends (not available) and more detailed information about their current DME and home set up and plans for possible discharge today to home setting.   expressing significant concern of being able to care for pt and requesting "any help I can get". " Spoke w MD regarding wt bearing status as pt stating NON WB BLE but MD clarified pt continues with heel wt bearing for txfs but no ambulation. Returned after pt completed her lunch and provided extensive education and training verbal and demo for mobility  w bed mobility and txfs training in a variety of set ups with pt and her  w min/mod return demo of understanding at this time.  Rec OT evaluation and treatment and SNF to improve overall safety w mobility at this time.      Patient left supine with all lines intact, call button in reach, nsg notified, and  present..    GOALS:   Multidisciplinary Problems       Physical Therapy Goals          Problem: Physical Therapy    Goal Priority Disciplines Outcome Goal Variances Interventions   Physical Therapy Goal     PT, PT/OT Ongoing, Progressing     Description: Goals to be met by: 10/11/2022    Patient will increase functional independence with mobility by performing:  Supine to sit with MInimal Assistance  Sit to supine with MInimal Assistance  Sit to stand transfer with Moderate Assistance  Gait  x 50 feet with Maximum Assistance using Rolling Walker.       REVISED GOALS (9/18/22):   To Be Met By: 10/18/22  1.  Txfs sup>sit CGA/min assist  2.  Txfs sit>sup CGA/min assist  3.  Seated scoot SBA clearing bed surface w B heel wt bearing B directions CGA  4.  Sit EOB or OOB chair/wheelchair for > 15 min w SBA  5.  /caregiver and pt to return demo understanding of safety education for txfs bed<>w/c or BSC using B heel wt bearing with or without sliding board use and pt to perf w min/mod assist.                           Time Tracking:     PT Received On: 09/18/22  PT Start Time: 1334     PT Stop Time: 1418  PT Total Time (min): 44 min + 20 min = 64 min         Billable Minutes: Therapeutic Activity 64    Treatment Type: Treatment, Family Training  PT/PTA: PT           09/18/2022

## 2022-09-18 NOTE — CONSULTS
[facility] Medicine  Consult Note    Patient Name: Tasha Hawley  MRN: [id number]  Admission Date: 9/16/2022  Hospital Length of Stay: 0 days  Attending Physician: Barber Allen MD  Primary Care Provider: Mesfin Hodges Ii, MD           Patient information was obtained from patient and ER records.     Consults  Subjective:     Principal Problem: <principal problem not specified>    Chief Complaint: No chief complaint on file.       HPI: Tasha Hawley is a 67yr with a PMH of anxiety, CHF, depression, lumbar radiculopathy with chronic pain, who presents for planned excision of bony prominence of the tarsometatarsal joints plantar aspect of both feet by podiatry, in an effort to prevent further formation of ulcerations.     Medicine was consulted for general medical management of patient's chronic medical problems, including her chronic pain.      Past Medical History:   Diagnosis Date    Anticoagulant long-term use     Anxiety     Arthritis     Bilateral lower extremity edema     severe chronic    Carotid artery occlusion     Cataract     CHF (congestive heart failure)     Coronary artery disease     subtotalled LAD with collateral    Depression     Fever blister     Hypothyroid     Iron deficiency anemia     Lumbar radiculopathy     with chronic pain    Ocular migraine     Renal disorder     Sleep apnea     cpap       Past Surgical History:   Procedure Laterality Date    ADENOIDECTOMY      BACK SURGERY      x 12    CARDIAC CATHETERIZATION  2016    subtotalled LAD with right to left collaterals    CATARACT EXTRACTION W/  INTRAOCULAR LENS IMPLANT Left     Dr Coleman     CYSTOSCOPIC LITHOLAPAXY N/A 6/27/2019    Procedure: CYSTOLITHOLAPAXY;  Surgeon: Shireen Mayo MD;  Location: [facility] OR [address] [place] [address];  Service: Urology;  Laterality: N/A;    CYSTOSCOPIC LITHOLAPAXY N/A 9/3/2019    Procedure: CYSTOLITHOLAPAXY;  Surgeon: Shireen Mayo MD;  Location: [facility] OR [address] [place] [address];   Service: Urology;  Laterality: N/A;    CYSTOSCOPY N/A 7/13/2021    Procedure: CYSTOSCOPY;  Surgeon: Shireen Mayo MD;  [facility];  Service: Urology;  Laterality: N/A;    CYSTOSCOPY  11/16/2021    Procedure: CYSTOSCOPY;  Surgeon: Shireen Mayo MD;  [facility];  Service: Urology;;    CYSTOSCOPY  7/19/2022    Procedure: CYSTOSCOPY;  Surgeon: Shireen Mayo MD;  [facility];  Service: Urology;;    CYSTOSCOPY WITH INJECTION OF PERIURETHRAL BULKING AGENT  7/19/2022    Procedure: CYSTOSCOPY, WITH PERIURETHRAL BULKING AGENT INJECTION-MACROPLASTIQUE;  Surgeon: Shireen Mayo MD;  [facility];  Service: Urology;;    ESOPHAGOGASTRODUODENOSCOPY N/A 5/23/2018    Procedure: ESOPHAGOGASTRODUODENOSCOPY (EGD);  Surgeon: Prince Vance MD;  Location: [facility] (4TH FLR);  Service: Endoscopy;  Laterality: N/A;  r/s 'd per Dr. Vance due to family emergency- ER    HYSTERECTOMY  1975    endometriosis    INJECTION OF BOTULINUM TOXIN TYPE A  7/13/2021    Procedure: INJECTION, BOTULINUM TOXIN, 200units;  Surgeon: Shireen Mayo MD;  [facility];  Service: Urology;;    INJECTION OF BOTULINUM TOXIN TYPE A  11/16/2021    Procedure: INJECTION, BOTULINUM TOXIN, 200units;  Surgeon: Shireen Mayo MD;  [facility];  Service: Urology;;    INJECTION OF BOTULINUM TOXIN TYPE A  7/19/2022    Procedure: INJECTION, BOTULINUM TOXIN, 300 units ;  Surgeon: Shireen Mayo MD;  [facility];  Service: Urology;;    pain pump placement      SQ Dilaudid Pump managed by Dr. Hillman, Pain Management    REPLACEMENT OF CATHETER N/A 10/31/2019    Procedure: REPLACEMENT, CATHETER-SUPRAPUBIC;  Surgeon: Shireen Mayo MD;  [facility];  Service: Urology;  Laterality: N/A;    SPINAL CORD STIMULATOR REMOVAL      before [place]    SPINE SURGERY  5-13-13    CERVICAL FUSION    TONSILLECTOMY         Review of patient's  allergies indicates:   Allergen Reactions    (d)-limonene flavor      Other reaction(s): difficult intubation  Other reaction(s): Difficulty breathing    Bactrim [sulfamethoxazole-trimethoprim] Anaphylaxis    Benadryl [diphenhydramine hcl] Shortness Of Breath    Fentanyl Itching, Nausea And Vomiting and Swelling             Imitrex [sumatriptan succinate] Shortness Of Breath    Percocet [oxycodone-acetaminophen] Shortness Of Breath    Topamax [topiramate] Shortness Of Breath    Vancomycin Shortness Of Breath     Rash    Butorphanol tartrate     Darvocet a500 [propoxyphene n-acetaminophen]      Other reaction(s): Difficulty breathing    White petrolatum-zinc     Zinc oxide-white petrolatum      Other reaction(s): Difficulty breathing    Latex, natural rubber Itching and Rash    Phenytoin Rash and Other (See Comments)     Trouble breathing       No current facility-administered medications on file prior to encounter.     Current Outpatient Medications on File Prior to Encounter   Medication Sig    aspirin (ECOTRIN) 81 MG EC tablet Take 1 tablet (81 mg total) by mouth once daily.    atorvastatin (LIPITOR) 80 MG tablet TAKE ONE TABLET BY MOUTH EVERY DAY (Patient taking differently: Take by mouth every evening.)    butalbital-acetaminophen-caffeine -40 mg (FIORICET, ESGIC) -40 mg per tablet Take 1 tablet by mouth daily as needed.    furosemide (LASIX) 40 MG tablet Take 1 tablet (40 mg total) by mouth once daily.    HYDROcodone-acetaminophen (NORCO)  mg per tablet Take by mouth every 24 hours as needed.    intrathecal pain pump compound Hydromorphone (7.5 mg/mL) infusion at 3.6799 mg/day (0.1533 mg/hr) out of a total reservoir volume of 37.3 mL  Pump filled every 2 months    levothyroxine (SYNTHROID) 137 MCG Tab tablet Take 1 tablet (137 mcg total) by mouth before breakfast.    pantoprazole (PROTONIX) 40 MG tablet TAKE ONE TABLET BY MOUTH EVERY DAY (Patient taking differently: Take  by mouth every morning.)    potassium chloride SA (K-DUR,KLOR-CON) 20 MEQ tablet Take 1 tablet (20 mEq total) by mouth 2 (two) times daily.    senna (SENNA LAX) 8.6 mg tablet Take 2 tablets by mouth 2 (two) times daily.    tiZANidine (ZANAFLEX) 4 MG tablet Take 4 mg by mouth 2 (two) times daily.    [DISCONTINUED] lidocaine (LIDODERM) 5 % 2 patches daily as needed. Lower back    acyclovir 5% (ZOVIRAX) 5 % ointment Apply topically 5 (five) times daily.    nitroGLYCERIN (NITROSTAT) 0.4 MG SL tablet Place 1 tablet (0.4 mg total) under the tongue every 5 (five) minutes as needed for Chest pain.    promethazine (PHENERGAN) 25 MG tablet Take 25 mg by mouth every 6 (six) hours as needed for Nausea.    [DISCONTINUED] ondansetron (ZOFRAN-ODT) 4 MG TbDL Take by mouth.    [DISCONTINUED] torsemide (DEMADEX) 100 MG Tab TAKE ONE TABLET BY MOUTH EVERY DAY     Family History       Problem Relation (Age of Onset)    Cancer Mother (55), Father    Cirrhosis Paternal Aunt, Paternal Uncle    Colon cancer Maternal Uncle    Esophageal cancer Father    Heart disease Maternal Uncle    Liver disease Paternal Aunt, Paternal Uncle    No Known Problems Brother, Brother, Sister, Maternal Aunt, Maternal Grandfather, Paternal Grandmother, Paternal Grandfather    Parkinsonism Maternal Grandmother    Tremor Maternal Grandmother          Tobacco Use    Smoking status: Never    Smokeless tobacco: Never   Substance and Sexual Activity    Alcohol use: Never    Drug use: No    Sexual activity: Never     Partners: Male     Review of Systems   Constitutional:  Negative for activity change, appetite change and chills.   HENT:  Negative for dental problem, drooling and ear pain.    Eyes:  Negative for pain and itching.   Respiratory:  Negative for cough and choking.    Cardiovascular:  Negative for palpitations and leg swelling.   Gastrointestinal:  Negative for blood in stool and constipation.   Genitourinary:  Negative for decreased urine  volume, genital sores and pelvic pain.   Musculoskeletal:  Negative for back pain and gait problem.   Skin:  Negative for pallor and rash.   Neurological:  Negative for seizures and numbness.   Psychiatric/Behavioral:  Negative for decreased concentration. The patient is nervous/anxious.    Objective:     Vital Signs (Most Recent):  Temp: 97.7 °F (36.5 °C) (09/17/22 2258)  Pulse: (!) 57 (09/17/22 2258)  Resp: 18 (09/17/22 2258)  BP: (!) 94/53 (09/17/22 2258)  SpO2: (!) 91 % (09/17/22 2258)   Vital Signs (24h Range):  Temp:  [97.7 °F (36.5 °C)-98.4 °F (36.9 °C)] 97.7 °F (36.5 °C)  Pulse:  [56-73] 57  Resp:  [16-18] 18  SpO2:  [91 %-95 %] 91 %  BP: ()/(42-55) 94/53     Weight: 91.5 kg (201 lb 11.5 oz)  Body mass index is 34.63 kg/m².    Physical Exam  Constitutional:       General: She is not in acute distress.     Appearance: She is ill-appearing.   HENT:      Right Ear: There is no impacted cerumen.      Left Ear: There is no impacted cerumen.      Nose: No congestion or rhinorrhea.      Mouth/Throat:      Pharynx: No oropharyngeal exudate.   Eyes:      General:         Left eye: No discharge.   Neck:      Vascular: No carotid bruit.   Cardiovascular:      Heart sounds: No murmur heard.    No friction rub.   Pulmonary:      Breath sounds: No wheezing, rhonchi or rales.   Abdominal:      Tenderness: There is no abdominal tenderness.      Hernia: No hernia is present.   Musculoskeletal:         General: Tenderness present. No signs of injury.      Cervical back: No tenderness.   Skin:     Coloration: Skin is not pale.      Findings: No bruising, erythema or lesion.   Psychiatric:         Behavior: Behavior normal.       Significant Labs: All pertinent labs within the past 24 hours have been reviewed.  CBC: No results for input(s): WBC, HGB, HCT, PLT in the last 48 hours.  CMP: No results for input(s): NA, K, CL, CO2, GLU, BUN, CREATININE, CALCIUM, PROT, ALBUMIN, BILITOT, ALKPHOS, AST, ALT, ANIONGAP, EGFRNONAA in  the last 48 hours.    Invalid input(s): ESTGFAFRICA  Cardiac Markers: No results for input(s): CKMB, MYOGLOBIN, BNP, TROPISTAT in the last 48 hours.  Lactic Acid: No results for input(s): LACTATE in the last 48 hours.  Lipase: No results for input(s): LIPASE in the last 48 hours.    Significant Imaging: I have reviewed all pertinent imaging results/findings within the past 24 hours.    Assessment/Plan:     Left foot pain  -may give additional doses of IV dilaudid, in addition to her current pump, to help with her current episodes. If it is possible to also increased her dilaudid pump dose for the next day or so, this can be done for severe pain  -c/w hydrocodone 7.5mg q6 prn.       Calcaneal spur of left foot  -c/w with pain management  -defer to PT and podiatry assessment    Known medical problems  -patient may be restarted on all her home meds following her recent surgery.   -additional pain management can be supplemented with oral narcotics,         Insomnia due to medical condition  C/w home trazodone and quetiapine,       Primary hypothyroidism  Patient likely needs an increase in thyroid medication, although this can be done outpatient. Her most recent TSH was 11.054. Due to her current surgery and likely alteration of TSH hormones, she should get a repeat outpatient in the next month.    Neurogenic bladder        Narcotic dependency, continuous  -may give additional breakthrough pain,   -defer to pain specialist longterm, who she sees outpatient    Cervical myelopathy        Generalized anxiety disorder  -c/w home fluoxetine        VTE Risk Mitigation (From admission, onward)    None              Thank you for your consult. I will sign off. Please contact us if you have any additional questions.    Barber Allen MD  Department of Hospital Medicine   [facility]

## 2022-09-19 ENCOUNTER — TELEPHONE (OUTPATIENT)
Dept: INTERNAL MEDICINE | Facility: CLINIC | Age: 67
End: 2022-09-19
Payer: MEDICARE

## 2022-09-19 VITALS
RESPIRATION RATE: 18 BRPM | HEART RATE: 48 BPM | BODY MASS INDEX: 33.38 KG/M2 | SYSTOLIC BLOOD PRESSURE: 96 MMHG | TEMPERATURE: 99 F | HEIGHT: 64 IN | WEIGHT: 195.56 LBS | OXYGEN SATURATION: 94 % | DIASTOLIC BLOOD PRESSURE: 49 MMHG

## 2022-09-19 PROCEDURE — 97165 OT EVAL LOW COMPLEX 30 MIN: CPT

## 2022-09-19 PROCEDURE — 97530 THERAPEUTIC ACTIVITIES: CPT

## 2022-09-19 PROCEDURE — 25000003 PHARM REV CODE 250: Performed by: PODIATRIST

## 2022-09-19 PROCEDURE — 25000003 PHARM REV CODE 250: Performed by: STUDENT IN AN ORGANIZED HEALTH CARE EDUCATION/TRAINING PROGRAM

## 2022-09-19 PROCEDURE — 97110 THERAPEUTIC EXERCISES: CPT

## 2022-09-19 RX ADMIN — LEVOTHYROXINE SODIUM 137 MCG: 25 TABLET ORAL at 05:09

## 2022-09-19 RX ADMIN — FUROSEMIDE 40 MG: 40 TABLET ORAL at 08:09

## 2022-09-19 RX ADMIN — TIZANIDINE 4 MG: 4 TABLET ORAL at 08:09

## 2022-09-19 RX ADMIN — FLUOXETINE 60 MG: 20 CAPSULE ORAL at 08:09

## 2022-09-19 RX ADMIN — HYDROCODONE BITARTRATE AND ACETAMINOPHEN 1 TABLET: 7.5; 325 TABLET ORAL at 11:09

## 2022-09-19 RX ADMIN — SENNOSIDES 2 TABLET: 8.6 TABLET, FILM COATED ORAL at 08:09

## 2022-09-19 RX ADMIN — ASPIRIN 81 MG: 81 TABLET, COATED ORAL at 08:09

## 2022-09-19 NOTE — TELEPHONE ENCOUNTER
Spoke to pt  and he would like raya or dr. Hodges to call him back. I tried to help him but he just said his wife deborahkd not be able to make her upcoming appt because she is a [facility] having sx. However he is requesting a referral to a rehab facility and a direct phone call from raya or Dr. Hodges.

## 2022-09-19 NOTE — PLAN OF CARE
Patient seen for physical therapy treatment, co-treatment with occupational therapy.  Patient with max complaints of pain, limiting treatment today.  Full report to follow.  Continue to recommend patient to discharge to skilled nursing facility for continued rehabilitation.  Physical therapy will continue to follow.      Problem: Physical Therapy  Goal: Physical Therapy Goal  Description:   REVISED GOALS (9/18/22):   To Be Met By: 10/18/22  1.  Txfs sup>sit CGA/min assist  2.  Txfs sit>sup CGA/min assist  3.  Seated scoot SBA clearing bed surface w B heel wt bearing B directions CGA  4.  Sit EOB or OOB chair/wheelchair for > 15 min w SBA  5.  /caregiver and pt to return demo understanding of safety education for txfs bed<>w/c or BSC using B heel wt bearing with or without sliding board use and pt to perf w min/mod assist.      9/19/2022 1250 by Sunni Sanchez, PT  Outcome: Ongoing, Progressing

## 2022-09-19 NOTE — TELEPHONE ENCOUNTER
----- Message from Zonia Flores sent at 9/19/2022 10:21 AM CDT -----  Contact: [phone] @ [facility]  Good Morning  Patient  is in the hospital and cannot come in to office tomorrow. Spouse would like a call back    Please call and advise

## 2022-09-19 NOTE — PLAN OF CARE
1010  CM received a call from the patient's spouse, Dangelo Hawley ([phone]), stating that he would like the patient to be admitted to [facility]. CM informed Dangelo that a referral was sent to [facility] but that the patient has been accepted to [facility]. Dangelo stated that he would rather the pt dc to [facility]. Voicemail message left for Alisson ([phone]) w/[facility] questioning acceptance. Awaiting response.     CM was informed that the patient has 2 open claims & that a letter of closure is required prior to admit.     1310  Patient awake & alert in bed when CM rounded. No family present. CM informed the patient that she will not be able to discharge to a SNF until the open claims are closed & that she will discharge home with [facility] today. Patient verbalized frustration. CM provided support.     1410  DC order noted. CM informed nurse Dany of the patient's discharge status.     1635  CM was informed by Prerna ([phone]) with [facility] that the patient has been accepted & that services will start tomorrow. Information added to the patient's discharge paperwork.

## 2022-09-19 NOTE — PLAN OF CARE
VN note: Progress notes, plan of care, labs, vital signs, and orders reviewed.    Problem: Adult Inpatient Plan of Care  Goal: Plan of Care Review  Outcome: Ongoing, Progressing

## 2022-09-19 NOTE — PROGRESS NOTES
Discharge orders noted. Additional clinical references attached. Patient's discharge instructions given by bedside RN and reviewed via this VN.  Education provided on new medication, diagnosis, and follow-up appointments.  Teach back method used. Patient verbalized understanding. All questions answered. Transport to [facility] requested. Floor nurse notified.      09/19/22 4955   AVS Confirmation   Discharge instructions and AVS given to and reviewed with patient and/or significant other. Yes

## 2022-09-19 NOTE — PLAN OF CARE
Telephone order taken by Dr. Mcguire okay to discharge pt.   He is unable to log into Epic at this time.      Ameya Porter RN   Supervisor Case Management-[place]  [phone]

## 2022-09-19 NOTE — PT/OT/SLP PROGRESS
"Physical Therapy Treatment    Patient Name:  Tasha Hawley   MRN:  [id number]    Recommendations:     Discharge Recommendations:  nursing facility, skilled   Discharge Equipment Recommendations: bedside commode, slide board (BSC with removeable arm rests)   Barriers to discharge:  pain, current functional mobility status    Assessment:     Tasha Hawley is a 67 y.o. female admitted with a medical diagnosis of <principal problem not specified>.  She presents with the following impairments/functional limitations:  weakness, impaired functional mobility, impaired endurance, pain, impaired balance, impaired sensation, impaired skin, impaired self care skills, decreased lower extremity function, decreased ROM, edema, orthopedic precautions Patient seen for physical therapy treatment, co-treatment with occupational therapy.  Patient with max complaints of pain, limiting treatment today. Continue to recommend patient to discharge to skilled nursing facility for continued rehabilitation.  Physical therapy will continue to follow.    Rehab Prognosis: Fair; patient would benefit from acute skilled PT services to address these deficits and reach maximum level of function.    Recent Surgery: Procedure(s) (LRB):  EXCISION ARTHRITIC BONE, BILATERAL FOOT (Bilateral) 3 Days Post-Op    Plan:     During this hospitalization, patient to be seen 5 x/week to address the identified rehab impairments via therapeutic activities, therapeutic exercises, neuromuscular re-education, wheelchair management/training and progress toward the following goals:    Plan of Care Expires:  10/18/22    Subjective     Chief Complaint: "I am hurting so much"  Patient/Family Comments/goals: To decrease pain and heal  Pain/Comfort:  Pain Rating 1: 9/10  Location - Side 1: Bilateral  Location - Orientation 1: generalized  Location 1: foot  Pain Addressed 1: Reposition, Distraction, Cessation of Activity, Nurse notified  Pain Rating Post-Intervention 1:  " (Does not rate)      Objective:     Communicated with nurse Saenz prior to session.  Patient found supine with bed alarm, peripheral IV (suprapubic catheter) upon PT entry to room.     General Precautions: Standard, fall   Orthopedic Precautions: (B LE WB to heels only - do not ambulate)   Braces: N/A  Respiratory Status: Room air     Functional Mobility:  Bed Mobility:     Rolling Left:  minimum assistance  Scooting: moderate assistance to scoot towards EOB.  At end of session, patient scoots up towards HOB with SBA  Supine to Sit: minimum assistance  Sit to Supine: maximal assistance  Transfers: Attempted to perform, however patient with max complaints of pain and feeling like she was going to put weight on B forefeet      AM-PAC 6 CLICK MOBILITY  Turning over in bed (including adjusting bedclothes, sheets and blankets)?: 3  Sitting down on and standing up from a chair with arms (e.g., wheelchair, bedside commode, etc.): 1  Moving from lying on back to sitting on the side of the bed?: 3  Moving to and from a bed to a chair (including a wheelchair)?: 1  Need to walk in hospital room?: 1  Climbing 3-5 steps with a railing?: 1  Basic Mobility Total Score: 10       Therapeutic Activities and Exercises:   Patient seen for PT and OT session together.  Pain is limiting factor for today's treatment. Patient educated on importance of asking for pain meds to stay on top of post-operative pain.  Patient sat at EOB x 10 minutes with SBA, performed UE and LE exercises.  Attempted a transfer, however patient upset stating that she was in too much pain and she felt like she was going to put weight on feet.  Nursing notified regarding pain.  Patient returned back to supine with max assist to B LEs.  Patient then performs 8-10 reps of Heel Slides, Hip Abduction, bridging, quad sets, and AROM for Ankles/feet/toes.  Discussed taking pain meds prior to tomorrow's treatment so that therapists can work on slide board transfer with  patient.  Patient in agreement.      Patient left supine with all lines intact, call button in reach, bed alarm on, and nurse Dany notified..    GOALS:   Multidisciplinary Problems       Physical Therapy Goals          Problem: Physical Therapy    Goal Priority Disciplines Outcome Goal Variances Interventions   Physical Therapy Goal     PT, PT/OT Ongoing, Progressing     Description:   REVISED GOALS (9/18/22):   To Be Met By: 10/18/22  1.  Txfs sup>sit CGA/min assist  2.  Txfs sit>sup CGA/min assist  3.  Seated scoot SBA clearing bed surface w B heel wt bearing B directions CGA  4.  Sit EOB or OOB chair/wheelchair for > 15 min w SBA  5.  /caregiver and pt to return demo understanding of safety education for txfs bed<>w/c or BSC using B heel wt bearing with or without sliding board use and pt to perf w min/mod assist.                           Time Tracking:     PT Received On: 09/19/22  PT Start Time: 1039     PT Stop Time: 1112  PT Total Time (min): 33 min     Billable Minutes: Therapeutic Activity 15 and Therapeutic Exercise 18    Treatment Type: Treatment  PT/PTA: PT     PTA Visit Number: 0     09/19/2022

## 2022-09-19 NOTE — PLAN OF CARE
Pt would benefit from cont OT services in order to maximize functional independence. Recommending post acute placement upon d/c. Pt requires increased assistance & is an increased fall risk at this time. Pt limited this date by increased pain in B feet; nsg notified. Per chart pt to be B heel wt bearing only for transfers - no long ambulation. Secure chat sent to MD regarding B DARCO shoes. Will progress as able.     Problem: Occupational Therapy  Goal: Occupational Therapy Goal  Description: Goals to be met by: 10/19/2022     Patient will increase functional independence with ADLs by performing:    Toileting from bedside commode with Stand-by Assistance for hygiene and clothing management.   Supine to sit with Modified [place].  Stand pivot transfers with Stand-by Assistance with appropriate AD while maintaining WB precautions.  Toilet transfer to bedside commode with Stand-by Assistance & appropriate AD while maintaining WB precautions.     Outcome: Ongoing, Progressing

## 2022-09-19 NOTE — PLAN OF CARE
"Pt is being denied by SNF's due to "the patient has two claims on her Medicare Common Working file. One is a WC dated 08/08/20, and the other is an auto claim dated 08/01/20. Each require a letter of closure prior to admit.'  I spoke with Dangelo Hawley (Spouse) [phone] regarding discharge plans.  Pt will not be able to discharge to SNF due to claims.  He does not have the letter of closure.  One remains open.  We discussed [facility] nursing home placement and he declined.  Pt is outpt recovery status.  Dangelo is agreeable for pt to discharge with home health.  He will be here at 14:30 to transport pt home.  Pt is wt bearing for transfers only.  She has a RW, BSC and WC at home.       09/19/22 1226   Post-Acute Status   Post-Acute Authorization Placement   Discharge Plan   Discharge Plan A Home with family   Discharge Plan B Home Health     Ameya Porter RN,   [phone]    "

## 2022-09-19 NOTE — PLAN OF CARE
A&Ox4. C/o pain, 10/10. Administered PRN pain medication as per order, pain decreased to 5/10. Bp is consistently below 100s systolic, doctors are aware. Dressings to alyson feet are clean, dry, and intact. Safety maintained.         Problem: Adult Inpatient Plan of Care  Goal: Plan of Care Review  Outcome: Ongoing, Progressing  Goal: Patient-Specific Goal (Individualized)  Outcome: Ongoing, Progressing  Goal: Absence of Hospital-Acquired Illness or Injury  Outcome: Ongoing, Progressing  Goal: Optimal Comfort and Wellbeing  Outcome: Ongoing, Progressing

## 2022-09-19 NOTE — PT/OT/SLP EVAL
Occupational Therapy   Evaluation    Name: Tasha Hawley  MRN: [id number]  Admitting Diagnosis:  <principal problem not specified>  Recent Surgery: Procedure(s) (LRB):  EXCISION ARTHRITIC BONE, BILATERAL FOOT (Bilateral) 3 Days Post-Op    Recommendations:     Discharge Recommendations: other (see comments) (post acute placement)  Discharge Equipment Recommendations:  bedside commode, slide board (BSC with removeable arm rests)  Barriers to discharge:  Other (Comment) (Pt requires increased level of assistance; pt is an increased fall risk at this time)    Assessment:     Tasha Hawley is a 67 y.o. female with a medical diagnosis of <principal problem not specified>.  She presents with The primary encounter diagnosis was Pain in both lower legs. Diagnoses of Charcot ankle, unspecified laterality and Impaired functional mobility and activity tolerance were also pertinent to this visit. Performance deficits affecting function: weakness, impaired endurance, gait instability, impaired functional mobility, impaired skin, decreased ROM, decreased lower extremity function, decreased upper extremity function, impaired balance, impaired self care skills, pain, impaired joint extensibility, decreased coordination, decreased safety awareness.      Pt would benefit from cont OT services in order to maximize functional independence. Recommending post acute placement upon d/c. Pt requires increased assistance & is an increased fall risk at this time. Pt limited this date by increased pain in B feet; nsg notified. Per chart pt to be B heel wt bearing only for transfers - no long ambulation. Secure chat sent to MD regarding B DARCO shoes. Will progress as able.     Rehab Prognosis: Good; patient would benefit from acute skilled OT services to address these deficits and reach maximum level of function.       Plan:     Patient to be seen 5 x/week to address the above listed problems via self-care/home management, therapeutic  activities, therapeutic exercises  Plan of Care Expires: 10/19/22  Plan of Care Reviewed with: patient    Subjective     Chief Complaint: Pain in B feet  Patient/Family Comments/goals: Pt would like to d/c to SNF    Occupational Profile:  Living Environment: Pt lives w/spouse, SSH, ramp, tub/shower combo w/SC & GB  Previous level of function: Pt reports she needs assistance with dressing, ambulating, in/out of tub  Equipment Used at Home:  walker, rolling, wheelchair, rollator, bedside commode, shower chair, oxygen  Assistance upon Discharge: Spouse    Pain/Comfort:  Pain Rating 1: 9/10  Location - Side 1: Bilateral  Location 1: foot  Pain Addressed 1: Reposition, Distraction, Cessation of Activity, Nurse notified  Pain Rating Post-Intervention 1: other (see comments) (pt crying out with increased pain)    Patients cultural, spiritual, Islam conflicts given the current situation: no    Objective:     Communicated with: nsg prior to session.  Patient found HOB elevated with bed alarm, peripheral IV, Other (comments) (suprapubic catheter) upon OT entry to room.    General Precautions: Standard, fall   Orthopedic Precautions: (B heel wt bearing only for transfers - no long ambulation.)   Braces:  (secure chat sent to MD regarding B DARCO shoes)  Respiratory Status: Room air    Occupational Performance:    Bed Mobility:    Patient completed Scooting/Bridging with stand by assistance to scoot to HOB in supine and moderate assistance to scoot to EOB  Patient completed Supine to Sit with minimum assistance  Patient completed Sit to Supine with maximal assistance    Functional Mobility/Transfers:  Patient attempting Sit <> Stand Transfer with maximal assistance and of 2 persons  with  rolling walker however patient with max complaints of pain and feeling like she was going to put weight on B forefeet. Pt unable to attempt stand at this time & requesting to return to supine 2/2 increased pain.     Cognitive/Visual  Perceptual:  Cognitive/Psychosocial Skills:     -       Oriented to: Person, Place, and Situation   -       Follows Commands/attention:Follows two-step commands and Follows multistep  commands  -       Safety awareness/insight to disability: impaired   -       Mood/Affect/Coping skills/emotional control: Tearful and Cooperative    Physical Exam:  Upper Extremity Range of Motion:     -       Right Upper Extremity: WFL except slight decreased shoulder flex  -       Left Upper Extremity: WFL except slight decreased shoulder flex  Upper Extremity Strength:    -       Right Upper Extremity: 3/5 prox, 3+/5 dist  -       Left Upper Extremity: 3/5 prox, 3+/5 dist   Strength:    -       Right Upper Extremity:  Fair  -       Left Upper Extremity:  Fair    AMPAC 6 Click ADL:  AMPAC Total Score: 13    Treatment & Education:  Pt would benefit from cont OT services in order to maximize functional independence.   Pt requires increased assistance & is an increased fall risk at this time.   Pt limited this date by increased pain in B feet; nsg notified.   Per chart pt to be B heel wt bearing only for transfers - no long ambulation.   Secure chat sent to MD regarding B DARCO shoes.   Pt performing bed mobility as above.   Will progress as able.     Patient left HOB elevated with all lines intact, call button in reach, bed alarm on, and nsg notified    GOALS:   Multidisciplinary Problems       Occupational Therapy Goals          Problem: Occupational Therapy    Goal Priority Disciplines Outcome Interventions   Occupational Therapy Goal     OT, PT/OT Ongoing, Progressing    Description: Goals to be met by: 10/19/2022     Patient will increase functional independence with ADLs by performing:    Toileting from bedside commode with Stand-by Assistance for hygiene and clothing management.   Supine to sit with Modified [place].  Stand pivot transfers with Stand-by Assistance with appropriate AD while maintaining WB  precautions.  Toilet transfer to bedside commode with Stand-by Assistance & appropriate AD while maintaining WB precautions.                          History:     Past Medical History:   Diagnosis Date    Anticoagulant long-term use     Anxiety     Arthritis     Bilateral lower extremity edema     severe chronic    Carotid artery occlusion     Cataract     CHF (congestive heart failure)     Coronary artery disease     subtotalled LAD with collateral    Depression     Fever blister     Hypothyroid     Iron deficiency anemia     Lumbar radiculopathy     with chronic pain    Ocular migraine     Renal disorder     Sleep apnea     cpap         Past Surgical History:   Procedure Laterality Date    ADENOIDECTOMY      BACK SURGERY      x 12    CARDIAC CATHETERIZATION  2016    subtotalled LAD with right to left collaterals    CATARACT EXTRACTION W/  INTRAOCULAR LENS IMPLANT Left     Dr Coleman     CYSTOSCOPIC LITHOLAPAXY N/A 6/27/2019    Procedure: CYSTOLITHOLAPAXY;  Surgeon: Shireen Mayo MD;  Location: [facility] OR 2ND FLR;  Service: Urology;  Laterality: N/A;    CYSTOSCOPIC LITHOLAPAXY N/A 9/3/2019    Procedure: CYSTOLITHOLAPAXY;  Surgeon: Shireen Mayo MD;  Location: [facility] OR 2ND FLR;  Service: Urology;  Laterality: N/A;    CYSTOSCOPY N/A 7/13/2021    Procedure: CYSTOSCOPY;  Surgeon: Shireen Mayo MD;  Location: [facility] OR 1ST FLR;  Service: Urology;  Laterality: N/A;    CYSTOSCOPY  11/16/2021    Procedure: CYSTOSCOPY;  Surgeon: Shireen Mayo MD;  [facility];  Service: Urology;;    CYSTOSCOPY  7/19/2022    Procedure: CYSTOSCOPY;  Surgeon: Shireen Mayo MD;  [facility];  Service: Urology;;    CYSTOSCOPY WITH INJECTION OF PERIURETHRAL BULKING AGENT  7/19/2022    Procedure: CYSTOSCOPY, WITH PERIURETHRAL BULKING AGENT INJECTION-MACROPLASTIQUE;  Surgeon: Shireen Mayo MD;  [facility];  Service: Urology;;    ESOPHAGOGASTRODUODENOSCOPY N/A 5/23/2018    Procedure:  ESOPHAGOGASTRODUODENOSCOPY (EGD);  Surgeon: Prince Vance MD;  Location: [facility] (4TH FLR);  Service: Endoscopy;  Laterality: N/A;  r/s 'd per Dr. Vance due to family emergency- ER    HYSTERECTOMY  1975    endometriosis    INJECTION OF BOTULINUM TOXIN TYPE A  7/13/2021    Procedure: INJECTION, BOTULINUM TOXIN, 200units;  Surgeon: Shireen Mayo MD;  [facility];  Service: Urology;;    INJECTION OF BOTULINUM TOXIN TYPE A  11/16/2021    Procedure: INJECTION, BOTULINUM TOXIN, 200units;  Surgeon: Shireen Mayo MD;  [facility];  Service: Urology;;    INJECTION OF BOTULINUM TOXIN TYPE A  7/19/2022    Procedure: INJECTION, BOTULINUM TOXIN, 300 units ;  Surgeon: Shireen Mayo MD;  [facility];  Service: Urology;;    pain pump placement      SQ Dilaudid Pump managed by Dr. Hillman, Pain Management    REMOVAL OF BONE SPUR OF FOOT Bilateral 9/16/2022    Procedure: EXCISION ARTHRITIC BONE, BILATERAL FOOT;  Surgeon: Adam Mcguire DPM;  Location: [facility] OR;  Service: Podiatry;  Laterality: Bilateral;    REPLACEMENT OF CATHETER N/A 10/31/2019    Procedure: REPLACEMENT, CATHETER-SUPRAPUBIC;  Surgeon: Shireen Mayo MD;  Location: [facility] OR [address] [place] [address];  Service: Urology;  Laterality: N/A;    SPINAL CORD STIMULATOR REMOVAL      before [facility]    SPINE SURGERY  5-13-13    CERVICAL FUSION    TONSILLECTOMY         Time Tracking:     OT Date of Treatment: 09/19/22  OT Start Time: 1039  OT Stop Time: 1102  OT Total Time (min): 23 min    Billable Minutes:Evaluation 10 with PT    9/19/2022

## 2022-09-20 NOTE — PLAN OF CARE
[facility] - Med Surg  Discharge Final Note    Primary Care Provider: Mesfin Hodges Ii, MD    Expected Discharge Date: 9/19/2022    Final Discharge Note (most recent)       Final Note - 09/20/22 0732          Final Note    Assessment Type Final Discharge Note (P)      Anticipated Discharge Disposition Home-Health Care Svc (P)    [place]       Post-Acute Status    Post-Acute Authorization Home Health (P)      Home Health Status Set-up Complete/Auth obtained (P)                      Important Message from Medicare             Contact Info       Adam Mcguire DPM   Specialty: Podiatry    2ND & 3RD CONTACT  [facility]   Phone: [phone]       Next Steps: Follow up in 1 week(s)    Instructions: For wound re-check    [facility] [address] [place] [address]   Phone: [phone]       Next Steps: Follow up on 9/20/2022    Instructions: will provide home health services

## 2022-09-21 ENCOUNTER — TELEPHONE (OUTPATIENT)
Dept: INTERNAL MEDICINE | Facility: CLINIC | Age: 67
End: 2022-09-21
Payer: MEDICARE

## 2022-09-22 ENCOUNTER — OUTPATIENT CASE MANAGEMENT (OUTPATIENT)
Dept: ADMINISTRATIVE | Facility: OTHER | Age: 67
End: 2022-09-22
Payer: MEDICARE

## 2022-09-22 NOTE — PROGRESS NOTES
"Outpatient Care Management  Initial Patient Assessment    Patient: Tasha Hawley  MRN: [id number]  Date of Service: 09/22/2022  Completed by: Caitlin Ordaz RN  Referral Date: 09/18/2022  Program: High Risk  Status: Ongoing  Effective Dates: 9/22/2022 - present  Responsible Staff: Caitlin Ordaz RN      Reason for Visit   Patient presents with    OPCM Chart Review     9/22/2022    Initial Assessment     9/22/2022    Plan Of Care     9/22/2022       Brief Summary:  Tasha Hawley was referred by POD Dr. Adam Mcguire 9/18/2022 at time of [facility] admission 9/16 for excision of arthritic bone alyson feet. Dx with Charcot ankle alyson, laterally. Per Hosp OT eval 9/19-- "Recommending post acute placement upon d/c. Pt requires increased assistance & is an increased fall risk at this time. Pt limited this date by increased pain in B feet; nsg notified. Per chart pt to be B heel wt bearing only for transfers - no long ambulation. Secure chat sent to MD regarding B DARCO shoes"...   Pt was discharged home with [facility]. According to spouse, pt was denied IP Rehab at at least [facility] because of "an open account"? And never received any special shoes/boots to be worn. Spouse, Dangelo refers to a Trust Fund settlement from pt's work related back injury years ago that covers expenses for treating/managing back injury/pain.etc. These injuries required several surgeries & rendered pt disabled to work. Costs cover her [facility] support. He says he called [facility] and was told pt would be approved for SNF.   Patient qualifies for program based on risk score = 95.2%.   Active problem list, medical, surgical and social history reviewed. Tasha is AAOx3, confined to bed or motorized w/c, transfers are difficult to impossible, requires assistance from spouse (80 yo) with all ADLs/IADLS . Spouse states he usually speaks for pt. He states pt's own son & daughter are not helping. Pain " to feet is rated 9-10/10,throbbing, dressings each foot are intact, dry, toes exposed without discoloration, swelling, or coolness. .Dangelo says he needs a break/burned out, physically unable to manage pt.   [facility] for dsg checks. Only POD to change dsgs first time.  Active comorbidities include chronic back pain from past injury, chronic pain syndrome, intrathecal pain pump, DDD lumbar, OA lumbar, scoliosis deformity of spine, narcotic dependency, chronic constipation, lymphedema alyson LEs, neurogenic bladder with suprapubic catheter, recurrent UTIs, Depression, anxiety, CECI, CHF- spouse denies pt having HF. Low BP at hosp- Lasix and K+ on hold Dangelo states. He checks pt's BP- but can't state a reading. PO2  =90-91%.   Areas of need identified by patient/spouse include facilitating pt to an SNF setting. Dangelo contacted [facility], Admissions, received & delivered papers for PCP to sign. And now waiting on the next step. He repeats how they would prefer to go to the [facility]. Will refer to OPCM SW to facilitate to IP Rehab as much as possible.   Complex care plan created with patient/caregiver input. By next encounter, patient/caregiver agree t to receive assistance as able to an SNF.     Assessment Documentation     OPCM Initial Assessment    Involvement of Care  Do I have permission to speak with other family members about your care?: Yes (Comment: Dangelo Chandan  -Spouse)  Assessment completed by: Patient, Spouse  Identified Areas of Need  Advanced Care Planning: No  Housing: yes (Comment: Talks about the need for a ramp. Still has damages to patio roof from [employer].)  Medication Adherence: No  *Active medication list was reviewed and reconciled with patient and/or caregiver:   Nutrition: yes (Comment: Potentially)  Lab Adherence: no  Depression: Yes (Comment: Score 11)  Cognitive/Behavioral Health: no  Communication: no  Health Literacy: no  Fall risk?:  Yes  Equipment/Supplies/Services: no (Comment: Not interested in the Life Alert. Too much a bother.)          Problem List and History     Problems Addressed This Visit    Heart Failure: Not identified by patient as current problem (Comment: Spouse says the heart is not pt's problem. It is lymphedema. Has to sleep with legs elevated.)  Anemia: Not identified by patient as current problem  Chronic Kidney Disease: Not identified by patient as current problem  Hyperlipidemia: Not identified by patient as current problem  UTI: Not identified by patient as current problem  Incontinence: Not identified by patient as current problem  Heart Disease: Not identified by patient as current problem         Reviewed medical and social history with patient and/or caregiver. A complex care plan was discussed and completed today, with input from patient and/or caregiver.    Patient Instructions     Instructions were provided via the [facility] patient resources and are available for the patient to view on the patient portal, if active.        Follow up in about 6 days (around 9/28/2022) for OPCM RN Follow Up to update care plan.    [facility] OPCM Self-Management Care Plan was developed with the patients/caregivers input and was based on identified barriers from todays assessment.  Goals were written today with the patient/caregiver and the patient has agreed to work towards these goals to improve his/her overall well-being. Patient verbalized understanding of the care plan, goals, and all of today's instructions. Encouraged patient/caregiver to communicate with his/her physician and health care team about health conditions and the treatment plan.  Provided my contact information today and encouraged patient/caregiver to call me with any questions as needed.

## 2022-09-22 NOTE — LETTER
"  [place]:  Caitlin Ordaz RN, [facility] Nurse   Tel:[phone] Mon-Fri, 8 am- 4:30 pm    Rebecca Payan, , [facility] ,   TEL: [phone] Mon-Fri, 8 am- 4:30 pm    [facility] On Call, 24/7 Nurse Help Line (non emergent)- TEL:  [phone]    [facility] Technical Issue Support Team--TEL:  [phone], Mon-Fri 9 am- 5 pm.    [email or web address] online patient portal    [facility] Financial Assistance TEL:  [phone]        [employer] Managed Care:    [employer] First 24 Hour Nurse Line--TEL:  [phone]    [employer] Managed Care,  Over-the-Counter Benefit-- TEL:  [phone]  On line:  [email or web address], select "Over-the-Counter (OTC) items from the "Shop OTC & Supplies drop down at the top of the page    [employer] Managed Care, [employer] Exercise- [facility]  On line Merchant Exchange Exercise and Health Education Resources        [email or web address]    [employer] Managed Care, "Mom's Meal", TEL: [phone]    [employer] Managed Care - Transportation [facility]-- TEL:  [phone]  Mon-Fri, 8 am-5 pm, 3 business days notice required.                   "

## 2022-09-22 NOTE — LETTER
September 22, 2022           Dear Mrs. Tasha Hawley and Mr. Dangelo Bronsonngelo,     Welcome to [facility] Complex Care Management Program.  It was a pleasure talking with you today.  My name is Caitlin Ordaz. I look forward to working with you as your .  My goal is to help you function at the healthiest and highest level possible.  You can contact me directly at [phone].    As an [facility] patient with [employer], some of the services we may be able to provide include:      Development of an individualized care plan with a Registered Nurse    Connection with a    Connection with available resources and services     Coordinate communication among your care team members    Provide coaching and education    Help you understand your doctors treatment plan   Help you obtain information about your insurance coverage.     All services provided by [facility] Complex Care Managers and other care team members are coordinated with and communicated to your primary care team.    As part of your enrollment, you will be receiving education materials and more information about these services in your [facility] account, by phone or through the mail.  If you do not wish to participate or receive information, please contact our office at [phone].      Sincerely,        Caitlin Ordaz, RN  [facility]   Out-patient RN Complex Care Manager

## 2022-09-23 ENCOUNTER — TELEPHONE (OUTPATIENT)
Dept: INTERNAL MEDICINE | Facility: CLINIC | Age: 67
End: 2022-09-23
Payer: MEDICARE

## 2022-09-23 LAB
FINAL PATHOLOGIC DIAGNOSIS: NORMAL
GROSS: NORMAL
Lab: NORMAL

## 2022-09-23 NOTE — TELEPHONE ENCOUNTER
----- Message from Nancy Lama sent at 9/23/2022 11:56 AM CDT -----  Contact: [phone] or [phone]  Pt said that she is calling in regards to needing to check to see if the doctor received paperwork for her to be admitted into a Rehab facility due to having surgery on her feet. Please advise      no

## 2022-09-26 ENCOUNTER — OUTPATIENT CASE MANAGEMENT (OUTPATIENT)
Dept: ADMINISTRATIVE | Facility: OTHER | Age: 67
End: 2022-09-26
Payer: MEDICARE

## 2022-09-26 ENCOUNTER — TELEPHONE (OUTPATIENT)
Dept: INTERNAL MEDICINE | Facility: CLINIC | Age: 67
End: 2022-09-26
Payer: MEDICARE

## 2022-09-26 DIAGNOSIS — Z74.09 IMPAIRED FUNCTIONAL MOBILITY AND ACTIVITY TOLERANCE: Primary | ICD-10-CM

## 2022-09-26 NOTE — TELEPHONE ENCOUNTER
----- Message from Reema Hutchinson sent at 9/26/2022  1:20 PM CDT -----  Contact: [facility]/states they have no phone #  [facility] calling requesting orders by put in for pt to get a potty chair. She just had sx on both of her feet and she cannot make it to the restroom and  is elderly and cannot help her. [facility] is asking this be done urgently for the patient. Can you please sent orders to [facility] and let pt know when completed because she is in a rough spot right now? Thanks

## 2022-09-26 NOTE — TELEPHONE ENCOUNTER
I put in the order for the beside commode, however I didn't know what to fill in before pending it.

## 2022-09-26 NOTE — TELEPHONE ENCOUNTER
----- Message from Caitlin Ordaz RN sent at 9/25/2022  4:04 PM CDT -----  Regarding: Outpatient Care Management Enrollment and Findings  Dr. Hodges/Staff:    Your patient [id number] Tasha Hawley  was  referred to [facility] 9/18/2022 at time of Excision of Arthritic Bone alyson foot for Pain Alyson LEs.     My name is Caitlin Ordaz RN, Care Manager.    Rebecca Payan LCSW with Outpatient Care Management is added to Care Team to assist in admission efforts to SNF,address PHQ =11.     In our enrollment encounter, the following needs were identified:  1) PHQ =11, denies SI. Per protocol, If score is > or=3: Physician should be notified and use their clinical judgement about treatment based on patient's duration of symptoms and functional impairment. The score also warrants a referral to the Outpatient Care Management .      2) Inquiry to status of [facility] admission papers to be filled/signed by Dr. Hodges that was brought to clinic by spouse/family.   Spoke with Cat at [facility]- TEL:  [phone] (FAX:  [phone]). Cat is generally aware of admission efforts by pt/family.   Within 30 days of MD paperwork submission--the following is needed: CXR, PPD, H&P, Recent PT/OT clinicals, Med List, MD orders for SNF admission, COVID testing, PNA, FLU vaccine UTD.       All needs and services provided by [facility] Man[facility] and other care team members will be communicated to you via your Resource Pool.      Our documentation can be readily accessed in the EMR using the following tabs: Chart review -> Episodes: High Risk.     It is our goal to provide holistic care, if at any time you feel other areas of concern need to be addressed, please communicate with me by Inbasket messaging.      Thank you,  ADOLPH Pruett, RN, CCM [facility]  [email or web address]  TEL:  [phone]

## 2022-09-27 ENCOUNTER — TELEPHONE (OUTPATIENT)
Dept: INTERNAL MEDICINE | Facility: CLINIC | Age: 67
End: 2022-09-27
Payer: MEDICARE

## 2022-09-27 ENCOUNTER — OUTPATIENT CASE MANAGEMENT (OUTPATIENT)
Dept: ADMINISTRATIVE | Facility: OTHER | Age: 67
End: 2022-09-27
Payer: MEDICARE

## 2022-09-27 NOTE — TELEPHONE ENCOUNTER
----- Message from Christina Pillai sent at 9/27/2022  2:10 PM CDT -----  Regarding: advice  Contact: patient [phone]  Need prior authorization for medical equipment that patient needs.  Please call and advise.

## 2022-09-27 NOTE — PROGRESS NOTES
Outpatient Care Management   - High Risk Patient Assessment    Patient: Tasha Hawley  MRN:  [id number]  Date of Service:  9/27/2022  Completed by:  Jaycee Caro LMSW  Referral Date: 09/18/2022    Reason for Visit   Patient presents with    Social Work Assessment - High Risk     9/27/2022    [facility] Chart Review    Plan Of Care     9/27/2022       Brief Summary:  [facility] for the following patient identified psycho-social needs: SNF Placement. GERSON completed social assessment with pt's spouse, Dangelo due to patient sleeping (involvement of care form in chart). Pt lives with Dangelo and is completely dependent on him for ADLs. Their adult children are not involved. She recently had bilateral surgery on her feet and is NWB, except on her heels. Pt was recently hospitalized and therapy recommended SNF placement. Pt ideally wanted to go to [facility], but they had no beds. Pt was accepted by [facility] clinically, but facility stated there was an open claim. Pt discharged home with home health. Dangelo and pt still want to pursue SNF placement and have started the process with [facility]. Dangelo stated he spoke with his injury  yesterday and was told there are no open claims. Money was placed into a trust fund to help pay for her back injury from work in 1997. GERSON encouraged Dangelo to get a letter stating the claim is closed. Dangelo expressed again that he would like pt to go to [facility]. GERSON told him there is a possibility there is a waitlist. GERSON checked with [facility] and there are 54 people on the wait list. Dangelo agreed with [facility] if [facility] could not take patient. GERSON verified with Cat in admissions at [facility] ([phone]). She stated she faxed paperwork to the clinic to be filled out by MD. She will need CXR, PPD, H&P, therapy notes, med list, COVID test, PNA/flu vaccine hx. She will need the paperwork prior to submitting  insurance auth for  SNF placement.   SW will send message to PCP to check on status of paperwork. Care plan was created in collaboration with patient/caregiver input.     Patient Summary     OPCM Social Work Assessment (High Risk)    Involvement of Care  Do I have permission to speak with other family members about your care?: Yes (Comment: , Dangelo)  Assessment completed by: Spouse  Cognitive  Which of the following can you state?: Name, Date of birth, Address, Year, President  Cognitive barriers?: No  General  Marital status:   Current employment status: Disabled  Support  Level of Caregiver support: Caregiver currently provides assistance  Support system: , Spouse or partner  Is the caregiver reporting burnout?: Yes  Support Barriers?: No  Advanced Care Planning  Do you have any of the following?: Medical power of   If yes, do we have a copy?: No  If no, would you like Advance Directive resources?: Yes  Advance Care Planning resources provided?: Yes  Is Advance Care Planning an area of need?: Yes  Financial  Current medical coverage: SNP  Have you applied for government assistance programs?: No  Are you unable to pay any of the following?: None  Gross monthly income: 780  Financial Support Barriers?: No  Safety  Safety barriers?: No   History  Do you or your spouse currently or formerly serve in the ?: No  Disaster Plan  Established evacuation plan?: No  Ability to evacuate: Stay in home  Mental Health Status  Emotional status: Stable  Have you recenetly lost a loved one?: No  Psychiatric diagnosis: Insomnia, anxiety  Current mental health treatment: Yes  Would you like mental health resources?: No  Current symptoms: Sleep disturbances (Comment: suffers from insomnia)  Mental/Behavioral/Environmental risk: None  Mental Health Barriers?: No  Addictive Behaviors  Current alcohol consumption?: No  Current substance abuse?: No  Gambling habits?: No  Was the PHQ depression screening  completed?: No  Was the GONZALO-7 completed?: No         Complex Care Plan     Care plan was discussed and completed today with input from patient and/or caregiver.    Patient Instructions     Follow up in about 1 day (around 9/28/2022) for NH Placement.    Todays OPCM Self-Management Care Plan was developed with the patients/caregivers input and was based on identified barriers from todays assessment.  Goals were written today with the patient/caregiver and the patient has agreed to work towards these goals to improve his/her overall well-being. Patient verbalized understanding of the care plan, goals, and all of today's instructions. Encouraged patient/caregiver to communicate with his/her physician and health care team about health conditions and the treatment plan.  Provided my contact information today and encouraged patient/caregiver to call me with any questions as needed.

## 2022-09-27 NOTE — TELEPHONE ENCOUNTER
----- Message from Jaycee Caro LMSW sent at 9/27/2022 10:52 AM CDT -----  Regarding: NH Placement  Good morning!  I am the  working with this patient and her  to hopefully get her into [facility]. This facility faxed over the admissions packet to the clinic. I am verifying that it has been received? Can this be completed and submitted back to [phone] as soon as possible?    Thank you so much!

## 2022-09-27 NOTE — TELEPHONE ENCOUNTER
Spoke to pt  and informed him I spoke to [facility] last week and they assured me that they would fax over admission forms however its usually the responsibility of the pt to  forms and bring them to pcp office. I informed mr crockett that I have yet to receive the forms so he is reaching out to social work rosa to see if she can possibly obtain the forms.

## 2022-09-27 NOTE — TELEPHONE ENCOUNTER
----- Message from Christina Pillai sent at 9/27/2022  2:10 PM CDT -----  Regarding: advice  Contact: patient [phone]  Need prior authorization for medical equipment that patient needs.  Please call and advise.

## 2022-09-28 ENCOUNTER — OUTPATIENT CASE MANAGEMENT (OUTPATIENT)
Dept: ADMINISTRATIVE | Facility: OTHER | Age: 67
End: 2022-09-28
Payer: MEDICARE

## 2022-09-28 NOTE — PROGRESS NOTES
Outpatient Care Management   - Care Plan Follow Up    Patient: Tasha Hawley  MRN:  [id number]  Date of Service:  9/28/2022  Completed by:  Jaycee Caro LMSW  Referral Date: 09/18/2022    Reason for Visit   Patient presents with    Update Plan Of Care     9/28/2022       Brief Summary: SW followed up with pt's , Dangelo via telephone today. Pt has been sleeping a lot today, per Dangelo. GERSON informed him that she been in contact with Cat, admission coordinator at [facility]. The PCP clinic had not received the admissions packet via fax. Cat emailed the packet to this SW and GERSON emailed it to Alberto Costa, clinic nurse, to assist in getting this completed by PCP. SW will continue to help coordinate between the clinic and [facility] and keep Dangelo updated. SW will follow up tomorrow.     Complex Care Plan     Care plan was discussed and completed today with input from patient and/or caregiver.    Patient Instructions     No follow-ups on file.    Todays OPCM Self-Management Care Plan was developed with the patients/caregivers input and was based on identified barriers from todays assessment.  Goals were written today with the patient/caregiver and the patient has agreed to work towards these goals to improve his/her overall well-being. Patient verbalized understanding of the care plan, goals, and all of today's instructions. Encouraged patient/caregiver to communicate with his/her physician and health care team about health conditions and the treatment plan.  Provided my contact information today and encouraged patient/caregiver to call me with any questions as needed.

## 2022-10-03 ENCOUNTER — OUTPATIENT CASE MANAGEMENT (OUTPATIENT)
Dept: ADMINISTRATIVE | Facility: OTHER | Age: 67
End: 2022-10-03
Payer: MEDICARE

## 2022-10-03 NOTE — PROGRESS NOTES
SW received completed nursing home paperwork from pt's PCP clinic. SW forwarded documents to Cat, [facility]. SW will continue to communicate with [facility] and PCP clinic for any other needs.

## 2022-10-05 ENCOUNTER — OUTPATIENT CASE MANAGEMENT (OUTPATIENT)
Dept: ADMINISTRATIVE | Facility: OTHER | Age: 67
End: 2022-10-05
Payer: MEDICARE

## 2022-10-05 NOTE — PROGRESS NOTES
King left message for Dangelo, pt's SO, regarding update on NH placement. KING informed him that [facility] has received the paperwork and KING is waiting to hear back from [facility] with admissions if anything else is needed. KING encouraged Dangelo in the message to reach out to [facility] to coordinate a home visit/paperwork. KING will attempt to call him back at a later time.

## 2022-10-05 NOTE — PROGRESS NOTES
Outpatient Care Management  Plan of Care Follow Up Visit    Patient: Tasha Hawley  MRN: [id number]  Date of Service: 10/05/2022  Completed by: Caitlin Ordaz RN  Referral Date: 09/18/2022    Reason for Visit   Patient presents with    OPCM Chart Review     10/5/2022    Update Plan Of Care     10/5/2022       Brief Summary:     Tasha remains in significant foot pain, seemingly bed bound d/t to heel  wt bearing alyson feet only. Unable to make it to post op POD appt 9/26 for this reason.   [facility] confirm their services continue for SN/PT. Updated Trina RN [facility]  that OPCM GERSON is working with [facility] for SNF.    F/u in 2 wks on pt status and care coordination with OPCM GERSON/HH  Patient Summary     Involvement of Care:  Do I have permission to speak with other family members about your care?       Patient Reported Labs & Vitals:  1.  Any Patient Reported Labs & Vitals?     2.  Patient Reported Blood Pressure:     3.  Patient Reported Pulse:     4.  Patient Reported Weight (Kg):     5.  Patient Reported Blood Glucose (mg/dl):       Medical and social history was reviewed with patient and/or caregiver.     Clinical Assessment     Reviewed and provided basic information on available community resources for mental health, transportation, wellness resources, and palliative care programs with patient and/or caregiver.     Complex Care Plan     Care plan was discussed and completed today with input from patient and/or caregiver.    Patient Instructions     Instructions were provided via the [employer] patient resources and are available for the patient to view on the patient portal.        Follow up in about 16 days (around 10/21/2022) for OPCM RN Follow Up to update care plan.    Todays OPCM Self-Management Care Plan was developed with the patients/caregivers input and was based on identified barriers from todays assessment.  Goals were written today with the patient/caregiver  and the patient has agreed to work towards these goals to improve his/her overall well-being. Patient verbalized understanding of the care plan, goals, and all of today's instructions. Encouraged patient/caregiver to communicate with his/her physician and health care team about health conditions and the treatment plan.  Provided my contact information today and encouraged patient/caregiver to call me with any questions as needed.

## 2022-10-06 ENCOUNTER — OUTPATIENT CASE MANAGEMENT (OUTPATIENT)
Dept: ADMINISTRATIVE | Facility: OTHER | Age: 67
End: 2022-10-06
Payer: MEDICARE

## 2022-10-06 NOTE — PROGRESS NOTES
GERSON spoke with Cat at [facility]. She has received the paperwork sent via email. She is checking to see if her facility can accommodate the pain pump that pt currently has. She also requested additional information regarding pt's NWB status and the need for SNF orders. GERSON collaborating with PCP, OPCM RN and podiatry clinic to get the requested information.

## 2022-10-07 ENCOUNTER — OUTPATIENT CASE MANAGEMENT (OUTPATIENT)
Dept: ADMINISTRATIVE | Facility: OTHER | Age: 67
End: 2022-10-07
Payer: MEDICARE

## 2022-10-07 NOTE — PROGRESS NOTES
Cat with [facility] reported that she is presenting pt's case to her Care Meeting this morning and will get back with GERSON. SW still waiting on SNF orders from PCP and progress notes from Podiatrist to send to her.

## 2022-10-10 ENCOUNTER — PATIENT OUTREACH (OUTPATIENT)
Dept: ADMINISTRATIVE | Facility: HOSPITAL | Age: 67
End: 2022-10-10
Payer: MEDICARE

## 2022-10-10 NOTE — PROGRESS NOTES
Health Maintenance Due   Topic Date Due    DEXA Scan  Never done    Colorectal Cancer Screening  06/02/2021    COVID-19 Vaccine (4 - Booster for Pfizer series) 05/31/2022    Influenza Vaccine (1) 09/01/2022     Triggered LINKS. Updated Care Everywhere. Scanned signed [facility] admission paperwork into chart. FYI flag for nursing home placed on chart. Chart review completed.

## 2022-10-11 ENCOUNTER — TELEPHONE (OUTPATIENT)
Dept: INTERNAL MEDICINE | Facility: CLINIC | Age: 67
End: 2022-10-11
Payer: MEDICARE

## 2022-10-11 ENCOUNTER — OUTPATIENT CASE MANAGEMENT (OUTPATIENT)
Dept: ADMINISTRATIVE | Facility: OTHER | Age: 67
End: 2022-10-11
Payer: MEDICARE

## 2022-10-11 DIAGNOSIS — Z74.09 IMPAIRED FUNCTIONAL MOBILITY AND ACTIVITY TOLERANCE: Primary | ICD-10-CM

## 2022-10-11 NOTE — TELEPHONE ENCOUNTER
----- Message from Carrie Myers MA sent at 10/11/2022  1:38 PM CDT -----  Regarding: FW: NH placement    ----- Message -----  From: Jaycee Caro LMSW  Sent: 10/11/2022  10:07 AM CDT  To: Caitlin Ordaz RN, [facility] Staff  Subject: NH placement                                     Good morning!  [facility] denied patient this morning. I spoke with patient's  regarding this and he and the patient will discuss if they want to move forward with another facility. Until then, I am working on getting more resources into the home. I confirmed with [facility] that there is not a  ordered to see them in the home. Can we get a social work consult placed in home health orders and faxed to [phone]?  I think this could be beneficial for them.  Thanks!

## 2022-10-11 NOTE — PROGRESS NOTES
Outpatient Care Management   - Care Plan Follow Up    Patient: Tasha Hawley  MRN:  [id number]  Date of Service:  10/11/2022  Completed by:  Jaycee Caro LMSW  Referral Date: 09/18/2022    Reason for Visit   Patient presents with    Update Plan Of Care     10/11/22       Brief Summary: GERSON followed up with pt's SO/caregiver, Dangelo, via telephone. GERSON informed him that Cat at [facility] stated they cannot take pt due to her pain pump and that they cannot take anymore patients on antipsychotics. Dangelo, understandably upset and frustrated with the process. SW encouraged him and pt to discuss additional facilities if they would like. Dangelo also open to adding more resources in the home. He stated they do have the funds for private sitter. SW will mail him list of sitters and information on Community Choices Waiver. GERSON will also check to see if a  has been ordered for home health to help with additional resources. GERSON will follow up later this week.     Complex Care Plan     Care plan was discussed and completed today with input from patient and/or caregiver.    Patient Instructions     Follow up in about 3 days (around 10/14/2022).    Todays OPCM Self-Management Care Plan was developed with the patients/caregivers input and was based on identified barriers from todays assessment.  Goals were written today with the patient/caregiver and the patient has agreed to work towards these goals to improve his/her overall well-being. Patient verbalized understanding of the care plan, goals, and all of today's instructions. Encouraged patient/caregiver to communicate with his/her physician and health care team about health conditions and the treatment plan.  Provided my contact information today and encouraged patient/caregiver to call me with any questions as needed.

## 2022-10-14 ENCOUNTER — OUTPATIENT CASE MANAGEMENT (OUTPATIENT)
Dept: ADMINISTRATIVE | Facility: OTHER | Age: 67
End: 2022-10-14
Payer: MEDICARE

## 2022-10-14 NOTE — PROGRESS NOTES
Outpatient Care Management   - Care Plan Follow Up    Patient: Tasha Hawley  MRN:  [id number]  Date of Service:  10/14/2022  Completed by:  Jaycee Caro LMSW  Referral Date: 09/18/2022    Reason for Visit   Patient presents with    Update Plan Of Care     10/14/2022       Brief Summary: SW followed up with pt's , Dangelo, today. He stated pt went to Podiatrist yesterday and her incision is infected. Pt is now on oral antibiotics. She will go back to Podiatrist next week. Pt has been moving around the house via wheelchair and home health is coming in. PCP ordered SW but one has not come out to the house yet. SW will check with HH agency to see when one will go out. Dangelo did receive mail SW sent but has not called any of the [facility]. GERSON will follow up in 2 weeks.     Complex Care Plan     Care plan was discussed and completed today with input from patient and/or caregiver.    Patient Instructions     No follow-ups on file.    [facility] OPCM Self-Management Care Plan was developed with the patients/caregivers input and was based on identified barriers from todays assessment.  Goals were written today with the patient/caregiver and the patient has agreed to work towards these goals to improve his/her overall well-being. Patient verbalized understanding of the care plan, goals, and all of today's instructions. Encouraged patient/caregiver to communicate with his/her physician and health care team about health conditions and the treatment plan.  Provided my contact information today and encouraged patient/caregiver to call me with any questions as needed.

## 2022-10-17 ENCOUNTER — OUTPATIENT CASE MANAGEMENT (OUTPATIENT)
Dept: ADMINISTRATIVE | Facility: OTHER | Age: 67
End: 2022-10-17
Payer: MEDICARE

## 2022-10-17 NOTE — PROGRESS NOTES
"Outpatient Care Management   - Care Plan Follow Up    Patient: Tasha Hawley  MRN:  [id number]  Date of Service:  10/17/2022  Completed by:  Jaycee Caro LMSW  Referral Date: 09/18/2022    Reason for Visit   Patient presents with    Update Plan Of Care     10/17/2022       Brief Summary: GERSON had reached out to Maryam on Friday 10/14/2022 with [facility] [place] regarding admitting pt's from home and they can ([facility] cannot). GERSON forwarded over clinicals for review. GERSON followed up with pt's  via telephone today. Mr. Pierson stated he would like to see how pt's podiatrist appointment goes later this week before saying yes to LTAC. He stated "we've been making it at home" and "she may not want to go that far".  He reported pt was currently sitting in her wheelchair and she did not sleep last night. GERSON will notify Maryam with [facility] of this and follow up with pt and spouse next week.     Complex Care Plan     Care plan was discussed and completed today with input from patient and/or caregiver.    Patient Instructions     Follow up in about 9 days (around 10/26/2022).    Todays OPCM Self-Management Care Plan was developed with the patients/caregivers input and was based on identified barriers from todays assessment.  Goals were written today with the patient/caregiver and the patient has agreed to work towards these goals to improve his/her overall well-being. Patient verbalized understanding of the care plan, goals, and all of today's instructions. Encouraged patient/caregiver to communicate with his/her physician and health care team about health conditions and the treatment plan.  Provided my contact information today and encouraged patient/caregiver to call me with any questions as needed.    "

## 2022-10-18 ENCOUNTER — OUTPATIENT CASE MANAGEMENT (OUTPATIENT)
Dept: ADMINISTRATIVE | Facility: OTHER | Age: 67
End: 2022-10-18
Payer: MEDICARE

## 2022-10-18 NOTE — PROGRESS NOTES
Outpatient Care Management  Plan of Care Follow Up Visit    Patient: Tasha Hawley  MRN: [id number]  Date of Service: 10/18/2022  Completed by: Caitlin Ordaz RN  Referral Date: 09/18/2022    Reason for Visit   Patient presents with    OPCM Chart Review     10/18/2022    Update Plan Of Care     10/18/2022       Brief Summary:      F/u call to patient/spouse to check on pt status. OPCM SW informed that pt was started on Cipro for possible foot infection. Dangelo updates how they would like pt to go to a facility but was denied by [facility] because of having pain pump. Dangelo really only wants pt to go to [facility] where all of pt's records would be and would be close to pt's home. Next POD appt 10/20/2022 in PM.    Plan to check with POD on Fri 10/21 for clinicals and fax request to [facility]  for their clinicals. Continue to collaborate with OPC SW.      Patient Summary     Involvement of Care:  Do I have permission to speak with other family members about your care?       Patient Reported Labs & Vitals:  1.  Any Patient Reported Labs & Vitals?     2.  Patient Reported Blood Pressure:     3.  Patient Reported Pulse:     4.  Patient Reported Weight (Kg):     5.  Patient Reported Blood Glucose (mg/dl):       Medical and social history was reviewed with patient and/or caregiver.     Clinical Assessment     Reviewed and provided basic information on available community resources for mental health, transportation, wellness resources, and palliative care programs with patient and/or caregiver.     Complex Care Plan     Care plan was discussed and completed today with input from patient and/or caregiver.    Patient Instructions     Instructions were provided via the [employer] [facility] patient resources and are available for the patient to view on the patient portal.        Follow up in about 3 days (around 10/21/2022).    Todays [facility] Self-Management Care Plan was developed with the patients/caregivers input and was based  on identified barriers from todays assessment.  Goals were written today with the patient/caregiver and the patient has agreed to work towards these goals to improve his/her overall well-being. Patient verbalized understanding of the care plan, goals, and all of today's instructions. Encouraged patient/caregiver to communicate with his/her physician and health care team about health conditions and the treatment plan.  Provided my contact information today and encouraged patient/caregiver to call me with any questions as needed.

## 2022-10-21 ENCOUNTER — OUTPATIENT CASE MANAGEMENT (OUTPATIENT)
Dept: ADMINISTRATIVE | Facility: OTHER | Age: 67
End: 2022-10-21
Payer: MEDICARE

## 2022-10-21 NOTE — PROGRESS NOTES
Outpatient Care Management  Plan of Care Follow Up Visit    Patient: Tasha Hawley  MRN: [id number]  Date of Service: 10/21/2022  Completed by: Caitlin Ordaz RN  Referral Date: 09/18/2022    Reason for Visit   Patient presents with    Update Plan Of Care     10/21/2022       Brief Summary:      Spoke with spouse, Dangelo. Due to pt c/o severe back pain Tasha could not attend 10/20 POD appt. Pain in feet continues but has not worsened--spouse states, in fact, it is getting better. Dangelo c/o wife not following through with the plan to take photos of feet for POD. Dangelo had a long night caring for pt and was in bed this AM when HH SN made visit for wd care. Photos were not captured. Dangelo confirms with Tasha that all Cipro pills were completed. Dangelo states pt has stitches to feet that need to be removed.  Plan is to take pictures for POD with next  SN wd care visits Mon 10/24.      F/u next week on receipt of clinicals from [facility] . Check on pt status, f/u with POD.    Patient Summary     Involvement of Care:  Do I have permission to speak with other family members about your care?       Patient Reported Labs & Vitals:  1.  Any Patient Reported Labs & Vitals?     2.  Patient Reported Blood Pressure:     3.  Patient Reported Pulse:     4.  Patient Reported Weight (Kg):     5.  Patient Reported Blood Glucose (mg/dl):       Medical and social history was reviewed with patient and/or caregiver.     Clinical Assessment     Reviewed and provided basic information on available community resources for mental health, transportation, wellness resources, and palliative care programs with patient and/or caregiver.     Complex Care Plan     Care plan was discussed and completed today with input from patient and/or caregiver.    Patient Instructions     Instructions were provided via the [facility] patient resources and are available for the patient to view on the patient portal.      Follow up in about 4 days (around  10/25/2022) for OPCM RN Follow Up to update care plan.    [facility] OPCM Self-Management Care Plan was developed with the patients/caregivers input and was based on identified barriers from todays assessment.  Goals were written today with the patient/caregiver and the patient has agreed to work towards these goals to improve his/her overall well-being. Patient verbalized understanding of the care plan, goals, and all of today's instructions. Encouraged patient/caregiver to communicate with his/her physician and health care team about health conditions and the treatment plan.  Provided my contact information today and encouraged patient/caregiver to call me with any questions as needed.

## 2022-10-21 NOTE — PROGRESS NOTES
Outpatient Care Management  Plan of Care Follow Up Visit    Patient: Tasha Hawley  MRN: [id number]  Date of Service: 10/18/2022  Completed by: Caitlin Ordaz RN  Referral Date: 09/18/2022    Reason for Visit   Patient presents with    OPCM Chart Review     10/18/2022    Update Plan Of Care     10/18/2022       Brief Summary:   F/u call to patient/spouse to check on pt status. OPCM SW informed that pt was started on Cipro for possible foot infection. Dangelo says HH SN changed foot dsgs and said they were looking good. Dangelo updates how they would like pt to go to a facility but was denied by [facility] because of having pain pump. Dangelo really only wants pt to go to [facility] where all of pt's records would be and would be close to pt's home. Next POD appt 10/20. Pt remains mostly bedbound, unable to amb due to restricted to heel bearing only.   --[facility] for efforts to get pt into LTAC per Jaycee Linares LCSW with [facility]. Sammi prefers to have this RN CM fax the request to Sammi rather for her to send clinicals based on this phone call.     [facility] TEL: [phone], FAX: [phone].     Dangelo agrees to bring pt to POD this Thurs 10/20.     F/u with Dr Mcguire, POD on 10/21 ---  for clinicals, collaborate with [facility] SW in efforts to facilitate to LT[facility] & provide needed documentation. Include [facility].    Patient Summary     Involvement of Care:  Do I have permission to speak with other family members about your care?       Patient Reported Labs & Vitals:  1.  Any Patient Reported Labs & Vitals?     2.  Patient Reported Blood Pressure:     3.  Patient Reported Pulse:     4.  Patient Reported Weight (Kg):     5.  Patient Reported Blood Glucose (mg/dl):       Medical and social history was reviewed with patient and/or caregiver.     Clinical Assessment     Reviewed and provided basic information on available community resources  for mental health, transportation, wellness resources, and palliative care programs with patient and/or caregiver.     Complex Care Plan     Care plan was discussed and completed today with input from patient and/or caregiver.    Patient Instructions     Instructions were provided via the [facility] patient resources and are available for the patient to view on the patient portal.        Follow up in about 3 days (around 10/21/2022).    Todays OPCM Self-Management Care Plan was developed with the patients/caregivers input and was based on identified barriers from todays assessment.  Goals were written today with the patient/caregiver and the patient has agreed to work towards these goals to improve his/her overall well-being. Patient verbalized understanding of the care plan, goals, and all of today's instructions. Encouraged patient/caregiver to communicate with his/her physician and health care team about health conditions and the treatment plan.  Provided my contact information today and encouraged patient/caregiver to call me with any questions as needed.

## 2022-10-25 ENCOUNTER — OUTPATIENT CASE MANAGEMENT (OUTPATIENT)
Dept: ADMINISTRATIVE | Facility: OTHER | Age: 67
End: 2022-10-25
Payer: MEDICARE

## 2022-10-26 ENCOUNTER — OUTPATIENT CASE MANAGEMENT (OUTPATIENT)
Dept: ADMINISTRATIVE | Facility: OTHER | Age: 67
End: 2022-10-26
Payer: MEDICARE

## 2022-10-26 NOTE — PROGRESS NOTES
1st Attempt to complete SW follow-up for Outpatient Care Management; left message requesting return call.  Sw also left reminder of PCP and POD appointment date and times on message. SW will reattempt at a later date.

## 2022-10-26 NOTE — LETTER
November 2, 2022    Tasha Bronsonngelo  [address]  [place] [address]             [facility] [address] [place] [address] Dear Mr and Mrs Hawley:     I am writing from the Outpatient Complex Care Management Department at [facility].  I have been unsuccessful at reaching you to follow up with you to see how you have been doing. I hope you find the resources previously provided to you helpful. Please contact me for further assistance.    I can be reached at [phone] Monday thru Friday from 8:00am to 4:30pm.  [facility] also has a program with a nurse available 24/7 to answer questions or provide medical advice.  [facility] can be reached at [phone].    Sincerely,     Jaycee Caro LMSW

## 2022-10-28 ENCOUNTER — OUTPATIENT CASE MANAGEMENT (OUTPATIENT)
Dept: ADMINISTRATIVE | Facility: OTHER | Age: 67
End: 2022-10-28
Payer: MEDICARE

## 2022-11-02 NOTE — PROGRESS NOTES
2nd Attempt to complete SW follow-up for Outpatient Care Management; left message requesting a return call on cell and home phone numbers and SW mailed a letter with contact information requesting a return call.  OPCM RN notified.

## 2022-11-03 ENCOUNTER — DOCUMENT SCAN (OUTPATIENT)
Dept: HOME HEALTH SERVICES | Facility: HOSPITAL | Age: 67
End: 2022-11-03
Payer: MEDICARE

## 2022-11-09 NOTE — PROGRESS NOTES
11-8-2022 3rd Attempt to complete SW follow-up for Outpatient Care Management;  left message requesting a return call.  SW will close case and notified OPCM RN.

## 2022-11-14 ENCOUNTER — TELEPHONE (OUTPATIENT)
Dept: UROLOGY | Facility: CLINIC | Age: 67
End: 2022-11-14
Payer: MEDICARE

## 2022-11-14 NOTE — TELEPHONE ENCOUNTER
----- Message from Ambar Magdaleno sent at 11/14/2022 10:54 AM CST -----  Regarding: Botox  Name of Who is Calling: JUDIE YO [[id number]]      What is the request in detail: Patient is requesting a call back concerning scheduling a Botox injection       Can the clinic reply by [facility]: no      [facility]: [phone]

## 2022-11-15 DIAGNOSIS — N31.9 NEUROGENIC BLADDER: Chronic | ICD-10-CM

## 2022-11-15 RX ORDER — OXYBUTYNIN CHLORIDE 15 MG/1
15 TABLET, EXTENDED RELEASE ORAL DAILY
Qty: 90 TABLET | Refills: 3 | OUTPATIENT
Start: 2022-11-15

## 2022-11-15 NOTE — TELEPHONE ENCOUNTER
No new care gaps identified.  [facility] Embedded Care Gaps. Reference number: [id number]. 11/15/2022   4:42:05 PM CST

## 2022-11-16 ENCOUNTER — TELEPHONE (OUTPATIENT)
Dept: UROLOGY | Facility: CLINIC | Age: 67
End: 2022-11-16
Payer: MEDICARE

## 2022-11-16 DIAGNOSIS — N39.46 MIXED STRESS AND URGE URINARY INCONTINENCE: Primary | ICD-10-CM

## 2022-11-16 NOTE — TELEPHONE ENCOUNTER
----- Message from Sandy Magdaleno sent at 11/16/2022  3:10 PM CST -----  Regarding: Pt Advice  Pt Is requesting a Call back regarding  Patient is requesting a call back concerning scheduling a Botox injection please Call to discuss further .  Pt states she is leaking Very Badly       Pt@[phone]

## 2022-11-17 ENCOUNTER — OUTPATIENT CASE MANAGEMENT (OUTPATIENT)
Dept: ADMINISTRATIVE | Facility: OTHER | Age: 67
End: 2022-11-17
Payer: MEDICARE

## 2022-11-18 NOTE — TELEPHONE ENCOUNTER
Called [facility], spoke w/ Rigoberto. Gave v/o for pt's suprapubic catheter to be changed no later than 12/2/22 and urine collected for culture from new catheter in preparation of procedure on 12/13/2022. Results to be faxed to clinic.

## 2022-11-22 ENCOUNTER — DOCUMENT SCAN (OUTPATIENT)
Dept: HOME HEALTH SERVICES | Facility: HOSPITAL | Age: 67
End: 2022-11-22
Payer: MEDICARE

## 2022-11-22 NOTE — PROGRESS NOTES
Outpatient Care Management  Plan of Care Follow Up Visit    Patient: Tasha Hawley  MRN: [id number]  Date of Service: 11/17/2022  Completed by: Caitlin Ordaz RN  Referral Date: 09/18/2022    Reason for Visit   Patient presents with    [facility] Chart Review     11/17/2022    Update Plan Of Care     11/17/2022    Case Closure     11/17/2022       Brief Summary:     Reached spouse, Dangelo who reports they are keeping up with all f/u appts to Dr. Mcguire- last visit 11/10. Still can not bear weight on feet. Another 2 wks at least before she can start a little walking. Pt is having trouble with one foot in particular. [facility] continue PT 1 x wk. Tasha is to return to POD 11/21. Dangelo reports managing the best he can. He says he and pt don't travel or enjoy their custodial d/t to pt's back and med appts. Dangelo is aware of the option to hire sitters to assist in pt's care- resources provided by [facility]. Pt/spouse are said to be able to afford the sitters. [facility] closed case 11/8/22 after 3 unsuccessful attempts to reach pt/spouse.   Spouse states knowledge of s/s infection to report.   Updated Care Plan- Wounds  Goal met--case closed.    Patient Summary     Involvement of Care:  Do I have permission to speak with other family members about your care?       Patient Reported Labs & Vitals:  1.  Any Patient Reported Labs & Vitals?     2.  Patient Reported Blood Pressure:     3.  Patient Reported Pulse:     4.  Patient Reported Weight (Kg):     5.  Patient Reported Blood Glucose (mg/dl):       Medical and social history was reviewed with patient and/or caregiver.     Clinical Assessment     Reviewed and provided basic information on available community resources for mental health, transportation, wellness resources, and palliative care programs with patient and/or caregiver.     Complex Care Plan     Care plan was discussed and completed today with input from patient and/or caregiver.    Patient  Instructions     Instructions were provided via the [employer] patient resources and are available for the patient to view on the patient portal.        Todays OPCM Self-Management Care Plan was developed with the patients/caregivers input and was based on identified barriers from todays assessment.  Goals were written today with the patient/caregiver and the patient has agreed to work towards these goals to improve his/her overall well-being. Patient verbalized understanding of the care plan, goals, and all of today's instructions. Encouraged patient/caregiver to communicate with his/her physician and health care team about health conditions and the treatment plan.  Provided my contact information today and encouraged patient/caregiver to call me with any questions as needed.

## 2022-11-23 ENCOUNTER — TELEPHONE (OUTPATIENT)
Dept: UROLOGY | Facility: CLINIC | Age: 67
End: 2022-11-23
Payer: MEDICARE

## 2022-11-23 ENCOUNTER — OFFICE VISIT (OUTPATIENT)
Dept: UROLOGY | Facility: CLINIC | Age: 67
End: 2022-11-23
Payer: MEDICARE

## 2022-11-23 DIAGNOSIS — R39.15 URINARY URGENCY: Primary | ICD-10-CM

## 2022-11-23 DIAGNOSIS — N39.41 URGE INCONTINENCE: ICD-10-CM

## 2022-11-23 PROCEDURE — 99442 PR PHYSICIAN TELEPHONE EVALUATION 11-20 MIN: CPT | Mod: 95,,, | Performed by: UROLOGY

## 2022-11-23 PROCEDURE — 99442 PR PHYSICIAN TELEPHONE EVALUATION 11-20 MIN: ICD-10-PCS | Mod: 95,,, | Performed by: UROLOGY

## 2022-11-23 RX ORDER — DARIFENACIN 7.5 MG/1
7.5 TABLET, EXTENDED RELEASE ORAL DAILY
Qty: 30 TABLET | Refills: 11 | Status: SHIPPED | OUTPATIENT
Start: 2022-11-23 | End: 2023-01-06

## 2022-11-23 RX ORDER — DARIFENACIN 7.5 MG/1
7.5 TABLET, EXTENDED RELEASE ORAL DAILY
Qty: 30 TABLET | Refills: 11 | Status: SHIPPED | OUTPATIENT
Start: 2022-11-23 | End: 2022-11-23 | Stop reason: CLARIF

## 2022-11-23 NOTE — TELEPHONE ENCOUNTER
----- Message from Bryanna Crowder sent at 11/22/2022  4:05 PM CST -----  Regarding: Plan of care  Contact: [phone]  Calling to speak with nurse Barraza regarding severe bladder leakage. Please call and discuss.

## 2022-11-23 NOTE — PROGRESS NOTES
The patient location is: home  The chief complaint leading to consultation is: frequency    Visit type: audio only    Time with patient:  20 minutes of total time spent on the encounter, which includes face to face time and non-face to face time preparing to see the patient (eg, review of tests), obtaining and/or reviewing separately obtained history, documenting clinical information in the electronic or other health record, independently interpreting results (not separately reported) and communicating results to the patient/family/caregiver, or care coordination (not separately reported).     Each patient to whom he or she provides medical services by telemedicine is:  (1) informed of the relationship between the physician and patient and the respective role of any other health care provider with respect to management of the patient; and (2) notified that he or she may decline to receive medical services by telemedicine and may withdraw from such care at any time.      CHIEF COMPLAINT:    Mrs. Hawley is a 67 y.o. female presenting for an urgent visit. Patient presents with mixed incontinence, has a suprapubic tube in place.  She is scheduled to have Botox injection of the bladder on 12/13/2022.     PRESENTING ILLNESS:    Tasha Hawley is a 67 y.o. female who is presents with a history of mixed incontinence.  She is scheduled to have Botox injection of the bladder.  She had botox and Macrosplastique injected in July 2022 and she states the stress component got better in addition to the urge.  Now she has both, she wanted a medication that would help with the urge component.      She states she does not feel like she is infected. The urine is clear and there is no cloudy urine, nor is it malodorous.     She had foot surgery with podiatry and she states it is quite painful.      REVIEW OF SYSTEMS:    Review of Systems   Constitutional: Negative.    HENT: Negative.     Eyes: Negative.    Respiratory: Negative.      Cardiovascular: Negative.    Gastrointestinal: Negative.    Genitourinary:  Positive for urgency.   Musculoskeletal:  Positive for back pain and joint pain.   Skin: Negative.    Neurological: Negative.    Endo/Heme/Allergies: Negative.    Psychiatric/Behavioral:  The patient is nervous/anxious.      PATIENT HISTORY:    Past Medical History:   Diagnosis Date    Anticoagulant long-term use     Anxiety     Arthritis     Bilateral lower extremity edema     severe chronic    Carotid artery occlusion     Cataract     CHF (congestive heart failure)     Coronary artery disease     subtotalled LAD with collateral    Depression     Fever blister     Hypothyroid     Iron deficiency anemia     Lumbar radiculopathy     with chronic pain    Ocular migraine     Renal disorder     Sleep apnea     cpap       Past Surgical History:   Procedure Laterality Date    ADENOIDECTOMY      BACK SURGERY      x 12    CARDIAC CATHETERIZATION  2016    subtotalled LAD with right to left collaterals    CATARACT EXTRACTION W/  INTRAOCULAR LENS IMPLANT Left     Dr Coleman     CYSTOSCOPIC LITHOLAPAXY N/A 6/27/2019    Procedure: CYSTOLITHOLAPAXY;  Surgeon: Shieren Mayo MD;  Location: [facility] OR [address] [place] [address];  Service: Urology;  Laterality: N/A;    CYSTOSCOPIC LITHOLAPAXY N/A 9/3/2019    Procedure: CYSTOLITHOLAPAXY;  Surgeon: Shireen Mayo MD;  [facility];  Service: Urology;  Laterality: N/A;    CYSTOSCOPY N/A 7/13/2021    Procedure: CYSTOSCOPY;  Surgeon: Shireen Mayo MD;  [facility];  Service: Urology;  Laterality: N/A;    CYSTOSCOPY  11/16/2021    Procedure: CYSTOSCOPY;  Surgeon: Shireen Mayo MD;  Location: [facility] OR [address] [place] [address];  Service: Urology;;    CYSTOSCOPY  7/19/2022    Procedure: CYSTOSCOPY;  Surgeon: Shireen Mayo MD;  [facility];  Service: Urology;;    CYSTOSCOPY WITH INJECTION OF PERIURETHRAL BULKING AGENT  7/19/2022    Procedure: CYSTOSCOPY, WITH PERIURETHRAL BULKING AGENT  INJECTION-MACROPLASTIQUE;  Surgeon: Shireen Mayo MD;  [facility];  Service: Urology;;    ESOPHAGOGASTRODUODENOSCOPY N/A 5/23/2018    Procedure: ESOPHAGOGASTRODUODENOSCOPY (EGD);  Surgeon: Prince Vance MD;  Location: [facility] (4TH FLR);  Service: Endoscopy;  Laterality: N/A;  r/s 'd per Dr. Vance due to family emergency- ER    HYSTERECTOMY  1975    endometriosis    INJECTION OF BOTULINUM TOXIN TYPE A  7/13/2021    Procedure: INJECTION, BOTULINUM TOXIN, 200units;  Surgeon: Shireen Mayo MD;  [facility];  Service: Urology;;    INJECTION OF BOTULINUM TOXIN TYPE A  11/16/2021    Procedure: INJECTION, BOTULINUM TOXIN, 200units;  Surgeon: Shireen Mayo MD;  [facility];  Service: Urology;;    INJECTION OF BOTULINUM TOXIN TYPE A  7/19/2022    Procedure: INJECTION, BOTULINUM TOXIN, 300 units ;  Surgeon: Shireen Mayo MD;  [facility];  Service: Urology;;    pain pump placement      SQ Dilaudid Pump managed by Dr. Hillman, Pain Management    REMOVAL OF BONE SPUR OF FOOT Bilateral 9/16/2022    Procedure: EXCISION ARTHRITIC BONE, BILATERAL FOOT;  Surgeon: NICOLA Mcgrath;  Location: [facility] OR;  Service: Podiatry;  Laterality: Bilateral;    REPLACEMENT OF CATHETER N/A 10/31/2019    Procedure: REPLACEMENT, CATHETER-SUPRAPUBIC;  Surgeon: Shireen Mayo MD;  [facility];  Service: Urology;  Laterality: N/A;    SPINAL CORD STIMULATOR REMOVAL      before Larissa    SPINE SURGERY  5-13-13    CERVICAL FUSION    TONSILLECTOMY         Family History   Problem Relation Age of Onset    Cancer Mother 55        breast    Cancer Father         esophagus,had laryngectomy    Esophageal cancer Father     Parkinsonism Maternal Grandmother     Tremor Maternal Grandmother     No Known Problems Brother     No Known Problems Brother     Heart disease Maternal Uncle     Colon cancer Maternal Uncle         Less than 60    No Known Problems  Sister     No Known Problems Maternal Aunt     Cirrhosis Paternal Aunt         ETOH    Liver disease Paternal Aunt         ETOH    Liver disease Paternal Uncle         ETOH    Cirrhosis Paternal Uncle         ETOH     Social History     Socioeconomic History    Marital status:    Tobacco Use    Smoking status: Never    Smokeless tobacco: Never   Substance and Sexual Activity    Alcohol use: Never    Drug use: No    Sexual activity: Never     Partners: Male     Social Determinants of Health     Financial Resource Strain: Low Risk     Difficulty of Paying Living Expenses: Not very hard   Food Insecurity: No Food Insecurity    Worried About Running Out of Food in the Last Year: Never true    Ran Out of Food in the Last Year: Never true   Transportation Needs: No Transportation Needs    Lack of Transportation (Medical): No    Lack of Transportation (Non-Medical): No   Physical Activity: Inactive    Days of Exercise per Week: 0 days    Minutes of Exercise per Session: 0 min   Housing Stability: Low Risk     Unable to Pay for Housing in the Last Year: No    Number of Places Lived in the Last Year: 1    Unstable Housing in the Last Year: No       Allergies:  (d)-limonene flavor; Bactrim [sulfamethoxazole-trimethoprim]; Benadryl [diphenhydramine hcl]; Fentanyl; Imitrex [sumatriptan succinate]; Percocet [oxycodone-acetaminophen]; Topamax [topiramate]; Vancomycin; Butorphanol tartrate; Darvocet a500 [propoxyphene n-acetaminophen]; Evening primrose (oenothera biennis); White petrolatum-zinc; Zinc oxide-white petrolatum; Latex, natural rubber; and Phenytoin    Medications:  Outpatient Encounter Medications as of 11/23/2022   Medication Sig Dispense Refill    acyclovir 5% (ZOVIRAX) 5 % ointment Apply topically 5 (five) times daily. (Patient not taking: Reported on 9/22/2022) 15 g 0    aspirin (ECOTRIN) 81 MG EC tablet Take 1 tablet (81 mg total) by mouth once daily. 30 tablet 0    atorvastatin (LIPITOR) 80 MG tablet TAKE  ONE TABLET BY MOUTH EVERY DAY 90 tablet 3    butalbital-acetaminophen-caffeine -40 mg (FIORICET, ESGIC) -40 mg per tablet Take 1 tablet by mouth daily as needed. 15 tablet 0    FLUoxetine 20 MG capsule Take 3 capsules (60 mg total) by mouth once daily. 270 capsule 0    furosemide (LASIX) 40 MG tablet Take 1 tablet (40 mg total) by mouth once daily. (Patient not taking: Reported on 9/22/2022) 30 tablet 0    HYDROcodone-acetaminophen (NORCO)  mg per tablet Take by mouth every 24 hours as needed.      HYDROcodone-acetaminophen (NORCO)  mg per tablet Take 1 tablet by mouth every 6 (six) hours as needed for Pain. 20 tablet 0    intrathecal pain pump compound Hydromorphone (7.5 mg/mL) infusion at 3.6799 mg/day (0.1533 mg/hr) out of a total reservoir volume of 37.3 mL  Pump filled every 2 months      levothyroxine (SYNTHROID) 137 MCG Tab tablet Take 1 tablet (137 mcg total) by mouth before breakfast. 30 tablet 0    nitroGLYCERIN (NITROSTAT) 0.4 MG SL tablet Place 1 tablet (0.4 mg total) under the tongue every 5 (five) minutes as needed for Chest pain. 25 tablet 3    pantoprazole (PROTONIX) 40 MG tablet TAKE ONE TABLET BY MOUTH EVERY DAY (Patient taking differently: Take by mouth every morning.) 90 tablet 3    potassium chloride SA (K-DUR,KLOR-CON) 20 MEQ tablet Take 1 tablet (20 mEq total) by mouth 2 (two) times daily. 180 tablet 3    promethazine (PHENERGAN) 25 MG tablet Take 25 mg by mouth every 6 (six) hours as needed for Nausea.      QUEtiapine (SEROQUEL) 100 MG Tab TAKE 2 TABLETS (200 MG) BY MOUTH NIGHTLY 180 tablet 0    QUEtiapine (SEROQUEL) 25 MG Tab Take 12.5-25 mg twice daily as needed for anxiety 90 tablet 0    senna (SENNA LAX) 8.6 mg tablet Take 2 tablets by mouth 2 (two) times daily.      tiZANidine (ZANAFLEX) 4 MG tablet Take 4 mg by mouth 2 (two) times daily.      traZODone (DESYREL) 300 MG tablet Take 1 tablet (300 mg total) by mouth every evening. 90 tablet 0       PHYSICAL  EXAMINATION:    The patient generally sounds in good health, is appropriately interactive, and is in no apparent distress.    Mental: Cooperative with normal affect.    Chest:  normal inspiratory effort.    LABS:    Lab Results   Component Value Date    BUN 11 08/22/2022    CREATININE 0.8 08/22/2022     IMPRESSION:    Mixed incontinence  Suprapubic tube in place for incomplete bladder emptying     PLAN:    1. Enablex 7.5 mg sent  2.  Follow up for cysto botox     I spent 20 minutes with the patient of which more than half was spent in direct consultation with the patient in regards to our treatment and plan.

## 2022-11-29 ENCOUNTER — TELEPHONE (OUTPATIENT)
Dept: UROLOGY | Facility: CLINIC | Age: 67
End: 2022-11-29
Payer: MEDICARE

## 2022-11-29 ENCOUNTER — TELEPHONE (OUTPATIENT)
Dept: DERMATOLOGY | Facility: CLINIC | Age: 67
End: 2022-11-29
Payer: MEDICARE

## 2022-11-29 NOTE — TELEPHONE ENCOUNTER
----- Message from Fatmata Kahn LPN sent at 11/28/2022  4:38 PM CST -----  Regarding: FW: Cesario Pt  Contact: pt@[phone]    ----- Message -----  From: Sandy Magdaleno  Sent: 11/28/2022  10:14 AM CST  To: [facility] Staff  Subject: Cesario Pt                                           Pt is requesting a call back Regarding Scheduling appt for   Painful Cyst Located On back   Attempt to schedule pt Earliest in epic today   Pt states she Cannot Make date due to Dental appt Please call to discuss further.

## 2022-11-29 NOTE — TELEPHONE ENCOUNTER
----- Message from Jazmin Barrera CMA sent at 11/29/2022  1:45 PM CST -----  Regarding: Please call  Contact: Pt @ [phone]  Patient states she is continuing to leak on herself and would like a call from the nurse please.     Thank you

## 2022-11-30 ENCOUNTER — TELEPHONE (OUTPATIENT)
Dept: UROLOGY | Facility: CLINIC | Age: 67
End: 2022-11-30
Payer: MEDICARE

## 2022-11-30 ENCOUNTER — HOSPITAL ENCOUNTER (EMERGENCY)
Facility: HOSPITAL | Age: 67
Discharge: HOME | End: 2022-11-30
Attending: EMERGENCY MEDICINE
Payer: MEDICARE

## 2022-11-30 VITALS
BODY MASS INDEX: 30.61 KG/M2 | SYSTOLIC BLOOD PRESSURE: 110 MMHG | HEIGHT: 67 IN | RESPIRATION RATE: 20 BRPM | WEIGHT: 195 LBS | DIASTOLIC BLOOD PRESSURE: 55 MMHG | TEMPERATURE: 98 F | OXYGEN SATURATION: 98 % | HEART RATE: 60 BPM

## 2022-11-30 DIAGNOSIS — E87.6 HYPOKALEMIA: ICD-10-CM

## 2022-11-30 DIAGNOSIS — Z43.5 ENCOUNTER FOR SUPRAPUBIC CATHETER CARE: Primary | ICD-10-CM

## 2022-11-30 LAB
BACTERIA #/AREA URNS AUTO: ABNORMAL /HPF
BILIRUB UR QL STRIP: NEGATIVE
BUN SERPL-MCNC: 7 MG/DL (ref 6–30)
CHLORIDE SERPL-SCNC: 95 MMOL/L (ref 95–110)
CLARITY UR REFRACT.AUTO: CLEAR
COLOR UR AUTO: YELLOW
CREAT SERPL-MCNC: 1.1 MG/DL (ref 0.5–1.4)
GLUCOSE SERPL-MCNC: 121 MG/DL (ref 70–110)
GLUCOSE UR QL STRIP: NEGATIVE
HCT VFR BLD CALC: 28 %PCV (ref 36–54)
HGB UR QL STRIP: ABNORMAL
KETONES UR QL STRIP: NEGATIVE
LEUKOCYTE ESTERASE UR QL STRIP: ABNORMAL
MICROSCOPIC COMMENT: ABNORMAL
NITRITE UR QL STRIP: NEGATIVE
PH UR STRIP: 6 [PH] (ref 5–8)
POC IONIZED CALCIUM: 1.02 MMOL/L (ref 1.06–1.42)
POC TCO2 (MEASURED): 34 MMOL/L (ref 23–29)
POTASSIUM BLD-SCNC: 3.1 MMOL/L (ref 3.5–5.1)
PROT UR QL STRIP: ABNORMAL
RBC #/AREA URNS AUTO: 40 /HPF (ref 0–4)
SAMPLE: ABNORMAL
SODIUM BLD-SCNC: 136 MMOL/L (ref 136–145)
SP GR UR STRIP: 1.01 (ref 1–1.03)
SQUAMOUS #/AREA URNS AUTO: 3 /HPF
URN SPEC COLLECT METH UR: ABNORMAL
WBC #/AREA URNS AUTO: 49 /HPF (ref 0–5)
WBC CLUMPS UR QL AUTO: ABNORMAL

## 2022-11-30 PROCEDURE — 99283 EMERGENCY DEPT VISIT LOW MDM: CPT | Mod: 25

## 2022-11-30 PROCEDURE — 99284 PR EMERGENCY DEPT VISIT,LEVEL IV: ICD-10-PCS | Mod: ,,, | Performed by: PHYSICIAN ASSISTANT

## 2022-11-30 PROCEDURE — 81001 URINALYSIS AUTO W/SCOPE: CPT | Performed by: PHYSICIAN ASSISTANT

## 2022-11-30 PROCEDURE — 99284 EMERGENCY DEPT VISIT MOD MDM: CPT | Mod: ,,, | Performed by: PHYSICIAN ASSISTANT

## 2022-11-30 PROCEDURE — 80047 BASIC METABLC PNL IONIZED CA: CPT

## 2022-11-30 PROCEDURE — 25000003 PHARM REV CODE 250: Performed by: PHYSICIAN ASSISTANT

## 2022-11-30 PROCEDURE — 87086 URINE CULTURE/COLONY COUNT: CPT | Performed by: PHYSICIAN ASSISTANT

## 2022-11-30 RX ADMIN — POTASSIUM BICARBONATE 50 MEQ: 978 TABLET, EFFERVESCENT ORAL at 04:11

## 2022-11-30 NOTE — TELEPHONE ENCOUNTER
Returned call to Sandee, who states she had trouble removing the suprapubic catheter and is on the second attempt at replacing it but she cannot get into the bladder. She is meeting complete resistance and cannot get past the subcutaneous layer of tissue. She's been changing her for over a year and has never had this issue. She is tried various angles to re-insert with no success. Advised for pt to report to clinic within the hour for reinsertion. SN relayed message to pt.

## 2022-11-30 NOTE — SUBJECTIVE & OBJECTIVE
Past Medical History:   Diagnosis Date    Anticoagulant long-term use     Anxiety     Arthritis     Bilateral lower extremity edema     severe chronic    Carotid artery occlusion     Cataract     CHF (congestive heart failure)     Coronary artery disease     subtotalled LAD with collateral    Depression     Fever blister     Hypothyroid     Iron deficiency anemia     Lumbar radiculopathy     with chronic pain    Ocular migraine     Renal disorder     Sleep apnea     cpap       Past Surgical History:   Procedure Laterality Date    ADENOIDECTOMY      BACK SURGERY      x 12    CARDIAC CATHETERIZATION  2016    subtotalled LAD with right to left collaterals    CATARACT EXTRACTION W/  INTRAOCULAR LENS IMPLANT Left     Dr Coleman     CYSTOSCOPIC LITHOLAPAXY N/A 6/27/2019    Procedure: CYSTOLITHOLAPAXY;  Surgeon: Shireen Mayo MD;  Location: [facility] OR 2ND FLR;  Service: Urology;  Laterality: N/A;    CYSTOSCOPIC LITHOLAPAXY N/A 9/3/2019    Procedure: CYSTOLITHOLAPAXY;  Surgeon: Shireen Mayo MD;  Location: [facility] OR 2ND FLR;  Service: Urology;  Laterality: N/A;    CYSTOSCOPY N/A 7/13/2021    Procedure: CYSTOSCOPY;  Surgeon: Shireen Mayo MD;  Location: [facility] OR 1ST FLR;  Service: Urology;  Laterality: N/A;    CYSTOSCOPY  11/16/2021    Procedure: CYSTOSCOPY;  Surgeon: Shireen Mayo MD;  Location: [facility] OR 1ST FLR;  Service: Urology;;    CYSTOSCOPY  7/19/2022    Procedure: CYSTOSCOPY;  Surgeon: Shireen Mayo MD;  Location: [facility] OR 1ST FLR;  Service: Urology;;    CYSTOSCOPY WITH INJECTION OF PERIURETHRAL BULKING AGENT  7/19/2022    Procedure: CYSTOSCOPY, WITH PERIURETHRAL BULKING AGENT INJECTION-MACROPLASTIQUE;  Surgeon: Shireen Mayo MD;  Location: [facility] OR 1ST FLR;  Service: Urology;;    ESOPHAGOGASTRODUODENOSCOPY N/A 5/23/2018    Procedure: ESOPHAGOGASTRODUODENOSCOPY (EGD);  Surgeon: Prince Vance MD;  Location: [facility] (4TH FLR);  Service: Endoscopy;  Laterality: N/A;  r/s 'd per Dr. Vance due to  family emergency- ER    HYSTERECTOMY  1975    endometriosis    INJECTION OF BOTULINUM TOXIN TYPE A  7/13/2021    Procedure: INJECTION, BOTULINUM TOXIN, 200units;  Surgeon: Shireen Mayo MD;  Location: [facility] OR [address] [place] [address];  Service: Urology;;    INJECTION OF BOTULINUM TOXIN TYPE A  11/16/2021    Procedure: INJECTION, BOTULINUM TOXIN, 200units;  Surgeon: Shireen Mayo MD;  [facility];  Service: Urology;;    INJECTION OF BOTULINUM TOXIN TYPE A  7/19/2022    Procedure: INJECTION, BOTULINUM TOXIN, 300 units ;  Surgeon: Shireen Mayo MD;  [facility];  Service: Urology;;    pain pump placement      SQ Dilaudid Pump managed by Dr. Hillman, Pain Management    REMOVAL OF BONE SPUR OF FOOT Bilateral 9/16/2022    Procedure: EXCISION ARTHRITIC BONE, BILATERAL FOOT;  Surgeon: NICOLA Mcgrath;  Location: [facility] OR;  Service: Podiatry;  Laterality: Bilateral;    REPLACEMENT OF CATHETER N/A 10/31/2019    Procedure: REPLACEMENT, CATHETER-SUPRAPUBIC;  Surgeon: Shireen Mayo MD;  Location: [facility] OR [address] [place] [address];  Service: Urology;  Laterality: N/A;    SPINAL CORD STIMULATOR REMOVAL      before Larissa    SPINE SURGERY  5-13-13    CERVICAL FUSION    TONSILLECTOMY         Review of patient's allergies indicates:   Allergen Reactions    (d)-limonene flavor      Other reaction(s): difficult intubation  Other reaction(s): Difficulty breathing    Bactrim [sulfamethoxazole-trimethoprim] Anaphylaxis    Benadryl [diphenhydramine hcl] Shortness Of Breath    Fentanyl Itching, Nausea And Vomiting and Swelling             Imitrex [sumatriptan succinate] Shortness Of Breath    Percocet [oxycodone-acetaminophen] Shortness Of Breath    Topamax [topiramate] Shortness Of Breath    Vancomycin Shortness Of Breath     Rash    Butorphanol tartrate     Darvocet a500 [propoxyphene n-acetaminophen]      Other reaction(s): Difficulty breathing    Evening primrose (oenothera biennis)     White petrolatum-zinc     Zinc  oxide-white petrolatum      Other reaction(s): Difficulty breathing    Latex, natural rubber Itching and Rash    Phenytoin Rash and Other (See Comments)     Trouble breathing       Family History       Problem Relation (Age of Onset)    Cancer Mother (55), Father    Cirrhosis Paternal Aunt, Paternal Uncle    Colon cancer Maternal Uncle    Esophageal cancer Father    Heart disease Maternal Uncle    Liver disease Paternal Aunt, Paternal Uncle    No Known Problems Brother, Brother, Sister, Maternal Aunt, Maternal Grandfather, Paternal Grandmother, Paternal Grandfather    Parkinsonism Maternal Grandmother    Tremor Maternal Grandmother            Tobacco Use    Smoking status: Never    Smokeless tobacco: Never   Substance and Sexual Activity    Alcohol use: Never    Drug use: No    Sexual activity: Never     Partners: Male       Review of Systems   Constitutional:  Negative for activity change, appetite change, chills, fever and unexpected weight change.   Respiratory:  Negative for shortness of breath.    Cardiovascular:  Negative for chest pain.   Gastrointestinal:  Negative for abdominal pain, constipation, diarrhea, nausea and vomiting.   Genitourinary:  Negative for dysuria, flank pain and hematuria.   Musculoskeletal:  Positive for back pain.   Skin:  Negative for wound.   Neurological:  Negative for headaches.   Psychiatric/Behavioral:  Negative for confusion.      Objective:     Temp:  [98.1 °F (36.7 °C)-98.2 °F (36.8 °C)] 98.1 °F (36.7 °C)  Pulse:  [55-56] 55  Resp:  [18-20] 20  SpO2:  [97 %-98 %] 98 %  BP: (100-105)/(50-60) 100/60     Body mass index is 30.54 kg/m².           Drains       Drain  Duration                  Suprapubic Catheter 07/19/22 1015 100% silicone 20 Fr. 134 days                    Physical Exam  Vitals reviewed.   Constitutional:       General: She is not in acute distress.     Appearance: She is not diaphoretic.   HENT:      Head: Normocephalic and atraumatic.      Nose: Nose normal.    Eyes:      Conjunctiva/sclera: Conjunctivae normal.   Cardiovascular:      Rate and Rhythm: Normal rate.   Pulmonary:      Effort: Pulmonary effort is normal. No respiratory distress.   Abdominal:      General: There is no distension.      Palpations: Abdomen is soft.      Tenderness: There is no right CVA tenderness, left CVA tenderness, guarding or rebound.      Comments: SPT tract site erythematous and swollen. No signs of infection.   Musculoskeletal:         General: Normal range of motion.      Cervical back: Normal range of motion.   Skin:     General: Skin is dry.   Neurological:      Mental Status: She is alert.   Psychiatric:         Behavior: Behavior normal.         Thought Content: Thought content normal.       Significant Labs:    BMP:  No results for input(s): NA, K, CL, CO2, BUN, CREATININE, LABGLOM, GLUCOSE, CALCIUM in the last 168 hours.    CBC:  Recent Labs   Lab 11/30/22  1416   HCT 28*       All pertinent labs results from the past 24 hours have been reviewed.    Significant Imaging:  All pertinent imaging results/findings from the past 24 hours have been reviewed.

## 2022-11-30 NOTE — ED PROVIDER NOTES
Encounter Date: 11/30/2022       History     Chief Complaint   Patient presents with    Wound Check     Suprapubic cath removed today to be changed, sent to ed for swelling at site, home health nurse unable to get back in     This is a 67 year old female with a PMH of CAD, lumbar radiculopathy, neurogenic bladder s/p suprapubic catheter presenting to the ED with catheter dislodgement. Home health came for a routine suprapubic catheter exchange this morning. The catheter was removed around 9am this morning and the nurse was unable to replace it due to swelling at the stoma site. The patient does reports increased urine output over the last few days, also notes chills and nausea. Denies fever, vomiting, abdominal pain, diarrhea or additional complaints.    Review of patient's allergies indicates:   Allergen Reactions    (d)-limonene flavor      Other reaction(s): difficult intubation  Other reaction(s): Difficulty breathing    Bactrim [sulfamethoxazole-trimethoprim] Anaphylaxis    Benadryl [diphenhydramine hcl] Shortness Of Breath    Fentanyl Itching, Nausea And Vomiting and Swelling             Imitrex [sumatriptan succinate] Shortness Of Breath    Percocet [oxycodone-acetaminophen] Shortness Of Breath    Topamax [topiramate] Shortness Of Breath    Vancomycin Shortness Of Breath     Rash    Butorphanol tartrate     Darvocet a500 [propoxyphene n-acetaminophen]      Other reaction(s): Difficulty breathing    Evening primrose (oenothera biennis)     White petrolatum-zinc     Zinc oxide-white petrolatum      Other reaction(s): Difficulty breathing    Latex, natural rubber Itching and Rash    Phenytoin Rash and Other (See Comments)     Trouble breathing     Past Medical History:   Diagnosis Date    Anticoagulant long-term use     Anxiety     Arthritis     Bilateral lower extremity edema     severe chronic    Carotid artery occlusion     Cataract     CHF (congestive heart failure)     Coronary artery disease      subtotalled LAD with collateral    Depression     Fever blister     Hypothyroid     Iron deficiency anemia     Lumbar radiculopathy     with chronic pain    Ocular migraine     Renal disorder     Sleep apnea     cpap     Past Surgical History:   Procedure Laterality Date    ADENOIDECTOMY      BACK SURGERY      x 12    CARDIAC CATHETERIZATION  2016    subtotalled LAD with right to left collaterals    CATARACT EXTRACTION W/  INTRAOCULAR LENS IMPLANT Left     Dr Coleman     CYSTOSCOPIC LITHOLAPAXY N/A 6/27/2019    Procedure: CYSTOLITHOLAPAXY;  Surgeon: Shireen Mayo MD;  Location: [facility] OR 2ND FLR;  Service: Urology;  Laterality: N/A;    CYSTOSCOPIC LITHOLAPAXY N/A 9/3/2019    Procedure: CYSTOLITHOLAPAXY;  Surgeon: Shireen Mayo MD;  Location: [facility] OR 2ND FLR;  Service: Urology;  Laterality: N/A;    CYSTOSCOPY N/A 7/13/2021    Procedure: CYSTOSCOPY;  Surgeon: Shireen Mayo MD;  Location: [facility] OR 1ST FLR;  Service: Urology;  Laterality: N/A;    CYSTOSCOPY  11/16/2021    Procedure: CYSTOSCOPY;  Surgeon: Shireen Mayo MD;  Location: [facility] OR 1ST FLR;  Service: Urology;;    CYSTOSCOPY  7/19/2022    Procedure: CYSTOSCOPY;  Surgeon: Shireen Mayo MD;  Location: [facility] OR 1ST FLR;  Service: Urology;;    CYSTOSCOPY WITH INJECTION OF PERIURETHRAL BULKING AGENT  7/19/2022    Procedure: CYSTOSCOPY, WITH PERIURETHRAL BULKING AGENT INJECTION-MACROPLASTIQUE;  Surgeon: Shireen Mayo MD;  Location: [facility] OR 1ST FLR;  Service: Urology;;    ESOPHAGOGASTRODUODENOSCOPY N/A 5/23/2018    Procedure: ESOPHAGOGASTRODUODENOSCOPY (EGD);  Surgeon: Prince Vance MD;  Location: [facility] ENDO (4TH FLR);  Service: Endoscopy;  Laterality: N/A;  r/s 'd per Dr. Vance due to family emergency- ER    HYSTERECTOMY  1975    endometriosis    INJECTION OF BOTULINUM TOXIN TYPE A  7/13/2021    Procedure: INJECTION, BOTULINUM TOXIN, 200units;  Surgeon: Shireen Mayo MD;  Location: [facility] OR 1ST FLR;  Service: Urology;;    INJECTION OF  BOTULINUM TOXIN TYPE A  11/16/2021    Procedure: INJECTION, BOTULINUM TOXIN, 200units;  Surgeon: Shireen Mayo MD;  Location: [facility] OR [address] [place] [address];  Service: Urology;;    INJECTION OF BOTULINUM TOXIN TYPE A  7/19/2022    Procedure: INJECTION, BOTULINUM TOXIN, 300 units ;  Surgeon: Shireen Mayo MD;  Location: [facility] OR [address] [place] [address];  Service: Urology;;    pain pump placement      SQ Dilaudid Pump managed by Dr. Hillman, Pain Management    REMOVAL OF BONE SPUR OF FOOT Bilateral 9/16/2022    Procedure: EXCISION ARTHRITIC BONE, BILATERAL FOOT;  Surgeon: Adam Mcguire DPM;  Location: [facility] OR;  Service: Podiatry;  Laterality: Bilateral;    REPLACEMENT OF CATHETER N/A 10/31/2019    Procedure: REPLACEMENT, CATHETER-SUPRAPUBIC;  Surgeon: Shireen Mayo MD;  Location: [facility] OR [address] [place] [address];  Service: Urology;  Laterality: N/A;    SPINAL CORD STIMULATOR REMOVAL      before Larissa    SPINE SURGERY  5-13-13    CERVICAL FUSION    TONSILLECTOMY       Family History   Problem Relation Age of Onset    Cancer Mother 55        breast    Cancer Father         esophagus,had laryngectomy    Esophageal cancer Father     Parkinsonism Maternal Grandmother     Tremor Maternal Grandmother     No Known Problems Brother     No Known Problems Brother     Heart disease Maternal Uncle     Colon cancer Maternal Uncle         Less than 60    No Known Problems Sister     No Known Problems Maternal Aunt     Cirrhosis Paternal Aunt         ETOH    Liver disease Paternal Aunt         ETOH    Liver disease Paternal Uncle         ETOH    Cirrhosis Paternal Uncle         ETOH    No Known Problems Maternal Grandfather     No Known Problems Paternal Grandmother     No Known Problems Paternal Grandfather     Melanoma Neg Hx     Amblyopia Neg Hx     Blindness Neg Hx     Cataracts Neg Hx     Diabetes Neg Hx     Glaucoma Neg Hx     Hypertension Neg Hx     Macular degeneration Neg Hx     Retinal detachment Neg Hx     Strabismus Neg Hx     Stroke Neg Hx      Thyroid disease Neg Hx     Celiac disease Neg Hx     Colon polyps Neg Hx     Cystic fibrosis Neg Hx     Crohn's disease Neg Hx     Inflammatory bowel disease Neg Hx     Liver cancer Neg Hx     Rectal cancer Neg Hx     Stomach cancer Neg Hx     Ulcerative colitis Neg Hx     Lymphoma Neg Hx      Social History     Tobacco Use    Smoking status: Never    Smokeless tobacco: Never   Substance Use Topics    Alcohol use: Never    Drug use: No     Review of Systems   Constitutional:  Negative for chills and fever.   Respiratory:  Negative for shortness of breath.    Cardiovascular:  Negative for chest pain.   Gastrointestinal:  Negative for nausea and vomiting.   Genitourinary:  Positive for frequency. Negative for dysuria.        +suprapubic cath problem   Musculoskeletal:  Negative for back pain.   Skin:  Positive for rash.   Neurological:  Negative for weakness.   Hematological:  Does not bruise/bleed easily.     Physical Exam     Initial Vitals [11/30/22 1231]   BP Pulse Resp Temp SpO2   (!) 105/50 (!) 56 18 98.2 °F (36.8 °C) 97 %      MAP       --         Physical Exam    Constitutional: She appears well-developed and well-nourished. No distress.   HENT:   Head: Atraumatic.   Eyes: Conjunctivae and EOM are normal. Pupils are equal, round, and reactive to light.   Cardiovascular:  Normal rate, regular rhythm and normal heart sounds.           Pulmonary/Chest: Breath sounds normal. No respiratory distress. She has no wheezes. She has no rhonchi. She has no rales.   Abdominal: Abdomen is soft. Bowel sounds are normal. There is abdominal tenderness in the suprapubic area.         Neurological: She is alert and oriented to person, place, and time.   Skin: Skin is warm and dry. There is erythema (stoma site).     ED Course   Procedures  Labs Reviewed   URINALYSIS, REFLEX TO URINE CULTURE - Abnormal; Notable for the following components:       Result Value    Protein, UA Trace (*)     Occult Blood UA 3+ (*)     Leukocytes, UA  3+ (*)     All other components within normal limits    Narrative:     Specimen Source->Urine   URINALYSIS MICROSCOPIC - Abnormal; Notable for the following components:    RBC, UA 40 (*)     WBC, UA 49 (*)     WBC Clumps, UA Many (*)     Bacteria Few (*)     All other components within normal limits    Narrative:     Specimen Source->Urine   ISTAT PROCEDURE - Abnormal; Notable for the following components:    POC Glucose 121 (*)     POC Potassium 3.1 (*)     POC TCO2 (MEASURED) 34 (*)     POC Ionized Calcium 1.02 (*)     POC Hematocrit 28 (*)     All other components within normal limits   CULTURE, URINE          Imaging Results    None          Medications   potassium bicarbonate disintegrating tablet 50 mEq (50 mEq Oral Given 11/30/22 1617)     Medical Decision Making:   Clinical Tests:   Lab Tests: Ordered and Reviewed  Other:   I have discussed this case with another health care provider.       <> Summary of the Discussion: Urology     APC / Resident Notes:   67 year old female presenting for suprapubic catheter replacement. Urology was consulted and successfully replaced the catheter in the ED. Istat chem 8 shows normal renal function and mildhypokalemia which was replaced in the ED. Urinalysis with 3+ leukocytes and blood, which is likely due to colonization. Will hold antibiotics pending culture results, urology agrees with plan. Discussed findings and plan with patient who verbalized understanding and agrees with plan and course of treatment.     Attending Attestation:     Physician Attestation Statement for NP/PA:   I discussed this assessment and plan of this patient with the NP/PA, but I did not personally examine the patient. The face to face encounter was performed by the NP/PA.                         Clinical Impression:   Final diagnoses:  [Z43.5] Encounter for suprapubic catheter care (Primary)  [E87.6] Hypokalemia      ED Disposition Condition    Discharge Stable          ED Prescriptions    None        Follow-up Information       Follow up With Specialties Details Why Contact Info    Shireen Mayo MD Urology Schedule an appointment as soon as possible for a visit   [address]  [place] [address]  [phone]               Nathalie Clarke PA-C  11/30/22 2044       Raj Villeda MD  12/03/22 2896

## 2022-11-30 NOTE — H&P (VIEW-ONLY)
[facility]  Progress Note    Arvind Cowan Patient Status:  Inpatient    1935 MRN [id number]   [facility] 6NE-A Attending Annalisa Garcia MD   [facility] Day # 15 PCP Jerome Donovan MD     Subjective:  Arvind Cowan is a(n) 81year old [facility] - Emergency Dept  Urology  Consult Note    Patient Name: Tasha Hawley  MRN: [id number]  Admission Date: 11/30/2022  Hospital Length of Stay: 0   Code Status: Prior   Attending Provider: No att. providers found   Consulting Provider: Iliana Llanos MD  Primary Care Physician: Mesfin Hodges Ii, MD  Principal Problem:<principal problem not specified>    Inpatient consult to Urology  Consult performed by: Iliana Llanos MD  Consult ordered by: Nathalie Clarke PA-C          Subjective:     HPI:  68 yo F with a PMH of CAD, lumbar radiculopathy, LO, and Neurogenic bladder managed by suprapubic catheter presenting to the ED with difficult SPT exchange. Home health came for a routine suprapubic catheter exchange this morning. The catheter was removed around 9am this morning and the nurse was unable to replace it due to swelling of the SPT tract site. Since the catheter has been removed the patient reports leakage of urine per urethra. The patient reports increased urinary leakage over the past couple weeks. She is scheduled for cystoscopy and bladder Botox injection on 12/13/22 with Dr. Mayo. Patient denies fevers, chills, nausea, vomiting, flank pain, gross hematuria, dysuria.     In the ED, patient is resting comfortably in bed. She is afebrile with normal vitals. Cr 1.1 (baseline ~0.9-1.1). 14Fr coude tipped Motley catheter placed without issue with return of 150cc of clear yellow urine.       Past Medical History:   Diagnosis Date    Anticoagulant long-term use     Anxiety     Arthritis     Bilateral lower extremity edema     severe chronic    Carotid artery occlusion     Cataract     CHF (congestive heart failure)     Coronary artery disease     subtotalled LAD with collateral    Depression     Fever blister     Hypothyroid     Iron deficiency anemia     Lumbar radiculopathy     with chronic pain    Ocular migraine     Renal disorder     Sleep apnea     cpap       Past Surgical History:    Procedure Laterality Date    ADENOIDECTOMY      BACK SURGERY      x 12    CARDIAC CATHETERIZATION  2016    subtotalled LAD with right to left collaterals    CATARACT EXTRACTION W/  INTRAOCULAR LENS IMPLANT Left     Dr Coleman     CYSTOSCOPIC LITHOLAPAXY N/A 6/27/2019    Procedure: CYSTOLITHOLAPAXY;  Surgeon: Shireen Mayo MD;  Location: [facility] OR 2ND FLR;  Service: Urology;  Laterality: N/A;    CYSTOSCOPIC LITHOLAPAXY N/A 9/3/2019    Procedure: CYSTOLITHOLAPAXY;  Surgeon: Shireen Mayo MD;  Location: [facility] OR 2ND FLR;  Service: Urology;  Laterality: N/A;    CYSTOSCOPY N/A 7/13/2021    Procedure: CYSTOSCOPY;  Surgeon: Shireen Mayo MD;  Location: [facility] OR 1ST FLR;  Service: Urology;  Laterality: N/A;    CYSTOSCOPY  11/16/2021    Procedure: CYSTOSCOPY;  Surgeon: Shireen Mayo MD;  [facility];  Service: Urology;;    CYSTOSCOPY  7/19/2022    Procedure: CYSTOSCOPY;  Surgeon: Shireen Mayo MD;  [facility];  Service: Urology;;    CYSTOSCOPY WITH INJECTION OF PERIURETHRAL BULKING AGENT  7/19/2022    Procedure: CYSTOSCOPY, WITH PERIURETHRAL BULKING AGENT INJECTION-MACROPLASTIQUE;  Surgeon: Shireen Mayo MD;  [facility];  Service: Urology;;    ESOPHAGOGASTRODUODENOSCOPY N/A 5/23/2018    Procedure: ESOPHAGOGASTRODUODENOSCOPY (EGD);  Surgeon: Prince Vance MD;  Location: [facility] (4TH FLR);  Service: Endoscopy;  Laterality: N/A;  r/s 'd per Dr. Vance due to family emergency- ER    HYSTERECTOMY  1975    endometriosis    INJECTION OF BOTULINUM TOXIN TYPE A  7/13/2021    Procedure: INJECTION, BOTULINUM TOXIN, 200units;  Surgeon: hSireen Mayo MD;  Location: [facility] OR 1ST FLR;  Service: Urology;;    INJECTION OF BOTULINUM TOXIN TYPE A  11/16/2021    Procedure: INJECTION, BOTULINUM TOXIN, 200units;  Surgeon: Shireen Mayo MD;  Location: [facility] OR 1ST FLR;  Service: Urology;;    INJECTION OF BOTULINUM TOXIN TYPE A  7/19/2022    Procedure: INJECTION,  3.10*   < > 3.56* 2.85* 3.28*   HGB 9.1*   < > 9.0*  9.0*   < > 10.4* 8.3* 9.5*   HCT 28.4*   < > 28.3*  28.3*   < > 32.3* 25.9* 29.8*   MCV 91.0   < > 91.3  91.3   < > 90.7 90.9 90.9   MCH 29.2   < > 29.0  29.0   < > 29.2 29.1 29.0   MCHC 32.0   < > 31.8 BOTULINUM TOXIN, 300 units ;  Surgeon: Shireen Mayo MD;  Location: [facility] OR [address] [place] [address];  Service: Urology;;    pain pump placement      SQ Dilaudid Pump managed by Dr. Hillman, Pain Management    REMOVAL OF BONE SPUR OF FOOT Bilateral 9/16/2022    Procedure: EXCISION ARTHRITIC BONE, BILATERAL FOOT;  Surgeon: Adam Mcguire [facility];  Location: [facility] OR;  Service: Podiatry;  Laterality: Bilateral;    REPLACEMENT OF CATHETER N/A 10/31/2019    Procedure: REPLACEMENT, CATHETER-SUPRAPUBIC;  Surgeon: Shireen Mayo MD;  Location: [facility] OR [address] [place] [address];  Service: Urology;  Laterality: N/A;    SPINAL CORD STIMULATOR REMOVAL      before Larissa    SPINE SURGERY  5-13-13    CERVICAL FUSION    TONSILLECTOMY         Review of patient's allergies indicates:   Allergen Reactions    (d)-limonene flavor      Other reaction(s): difficult intubation  Other reaction(s): Difficulty breathing    Bactrim [sulfamethoxazole-trimethoprim] Anaphylaxis    Benadryl [diphenhydramine hcl] Shortness Of Breath    Fentanyl Itching, Nausea And Vomiting and Swelling             Imitrex [sumatriptan succinate] Shortness Of Breath    Percocet [oxycodone-acetaminophen] Shortness Of Breath    Topamax [topiramate] Shortness Of Breath    Vancomycin Shortness Of Breath     Rash    Butorphanol tartrate     Darvocet a500 [propoxyphene n-acetaminophen]      Other reaction(s): Difficulty breathing    Evening primrose (oenothera biennis)     White petrolatum-zinc     Zinc oxide-white petrolatum      Other reaction(s): Difficulty breathing    Latex, natural rubber Itching and Rash    Phenytoin Rash and Other (See Comments)     Trouble breathing       Family History       Problem Relation (Age of Onset)    Cancer Mother (55), Father    Cirrhosis Paternal Aunt, Paternal Uncle    Colon cancer Maternal Uncle    Esophageal cancer Father    Heart disease Maternal Uncle    Liver disease Paternal Aunt, Paternal Uncle    No Known Problems Brother, Brother, Sister,  above  · Mechanical fall with left hip fracture s/p left hip hemiarthroplasty 5/29  · Anxiety/depression/delirium/encephalopathy-remains in need of intermittent Precedex     Plan:  · Continue diuretics as tolerated/add albumin  · Every effort will be made Maternal Aunt, Maternal Grandfather, Paternal Grandmother, Paternal Grandfather    Parkinsonism Maternal Grandmother    Tremor Maternal Grandmother            Tobacco Use    Smoking status: Never    Smokeless tobacco: Never   Substance and Sexual Activity    Alcohol use: Never    Drug use: No    Sexual activity: Never     Partners: Male       Review of Systems   Constitutional:  Negative for activity change, appetite change, chills, fever and unexpected weight change.   Respiratory:  Negative for shortness of breath.    Cardiovascular:  Negative for chest pain.   Gastrointestinal:  Negative for abdominal pain, constipation, diarrhea, nausea and vomiting.   Genitourinary:  Negative for dysuria, flank pain and hematuria.   Musculoskeletal:  Positive for back pain.   Skin:  Negative for wound.   Neurological:  Negative for headaches.   Psychiatric/Behavioral:  Negative for confusion.      Objective:     Temp:  [98.1 °F (36.7 °C)-98.2 °F (36.8 °C)] 98.1 °F (36.7 °C)  Pulse:  [55-56] 55  Resp:  [18-20] 20  SpO2:  [97 %-98 %] 98 %  BP: (100-105)/(50-60) 100/60     Body mass index is 30.54 kg/m².           Drains       Drain  Duration                  Suprapubic Catheter 07/19/22 1015 100% silicone 20 Fr. 134 days                    Physical Exam  Vitals reviewed.   Constitutional:       General: She is not in acute distress.     Appearance: She is not diaphoretic.   HENT:      Head: Normocephalic and atraumatic.      Nose: Nose normal.   Eyes:      Conjunctiva/sclera: Conjunctivae normal.   Cardiovascular:      Rate and Rhythm: Normal rate.   Pulmonary:      Effort: Pulmonary effort is normal. No respiratory distress.   Abdominal:      General: There is no distension.      Palpations: Abdomen is soft.      Tenderness: There is no right CVA tenderness, left CVA tenderness, guarding or rebound.      Comments: SPT tract site erythematous and swollen. No signs of infection.   Musculoskeletal:         General: Normal range of  motion.      Cervical back: Normal range of motion.   Skin:     General: Skin is dry.   Neurological:      Mental Status: She is alert.   Psychiatric:         Behavior: Behavior normal.         Thought Content: Thought content normal.       Significant Labs:    BMP:  No results for input(s): NA, K, CL, CO2, BUN, CREATININE, LABGLOM, GLUCOSE, CALCIUM in the last 168 hours.    CBC:  Recent Labs   Lab 11/30/22  1416   HCT 28*       All pertinent labs results from the past 24 hours have been reviewed.    Significant Imaging:  All pertinent imaging results/findings from the past 24 hours have been reviewed.                      Assessment and Plan:     Suprapubic catheter  66 yo F with Neurogenic bladder managed by SPT. Patient presents to ED due to inability to replace SPT after removal this morning.     - 14Fr coude Motley catheter placed without issue with return of 150cc of clear yellow urine.   - Send urine for culture.   - Ok for discharge from Urology perspective.   - Follow up as planned for cystoscopy with bladder Botox injections with Dr. Mayo on 12/13/22.         VTE Risk Mitigation (From admission, onward)      None            Thank you for your consult. I will sign off. Please contact us if you have any additional questions.    Iliana Llanos MD  Urology  [facility] Dept    As above.

## 2022-11-30 NOTE — CONSULTS
[facility] - Emergency Dept  Urology  Consult Note    Patient Name: Tasha Hawley  MRN: [id number]  Admission Date: 11/30/2022  Hospital Length of Stay: 0   Code Status: Prior   Attending Provider: No att. providers found   Consulting Provider: Iliana Llanos MD  Primary Care Physician: Mesfin Hodges Ii, MD  Principal Problem:<principal problem not specified>    Inpatient consult to Urology  Consult performed by: Iliana Llanos MD  Consult ordered by: Nathalie Clarke PA-C          Subjective:     HPI:  68 yo F with a PMH of CAD, lumbar radiculopathy, LO, and Neurogenic bladder managed by suprapubic catheter presenting to the ED with difficult SPT exchange. Home health came for a routine suprapubic catheter exchange this morning. The catheter was removed around 9am this morning and the nurse was unable to replace it due to swelling of the SPT tract site. Since the catheter has been removed the patient reports leakage of urine per urethra. The patient reports increased urinary leakage over the past couple weeks. She is scheduled for cystoscopy and bladder Botox injection on 12/13/22 with Dr. Mayo. Patient denies fevers, chills, nausea, vomiting, flank pain, gross hematuria, dysuria.     In the ED, patient is resting comfortably in bed. She is afebrile with normal vitals. Cr 1.1 (baseline ~0.9-1.1). 14Fr coude tipped Motley catheter placed without issue with return of 150cc of clear yellow urine.       Past Medical History:   Diagnosis Date    Anticoagulant long-term use     Anxiety     Arthritis     Bilateral lower extremity edema     severe chronic    Carotid artery occlusion     Cataract     CHF (congestive heart failure)     Coronary artery disease     subtotalled LAD with collateral    Depression     Fever blister     Hypothyroid     Iron deficiency anemia     Lumbar radiculopathy     with chronic pain    Ocular migraine     Renal disorder     Sleep apnea     cpap       Past Surgical History:    Procedure Laterality Date    ADENOIDECTOMY      BACK SURGERY      x 12    CARDIAC CATHETERIZATION  2016    subtotalled LAD with right to left collaterals    CATARACT EXTRACTION W/  INTRAOCULAR LENS IMPLANT Left     Dr Coleman     CYSTOSCOPIC LITHOLAPAXY N/A 6/27/2019    Procedure: CYSTOLITHOLAPAXY;  Surgeon: Shireen Mayo MD;  Location: [facility] OR 2ND FLR;  Service: Urology;  Laterality: N/A;    CYSTOSCOPIC LITHOLAPAXY N/A 9/3/2019    Procedure: CYSTOLITHOLAPAXY;  Surgeon: Shireen Mayo MD;  Location: [facility] OR 2ND FLR;  Service: Urology;  Laterality: N/A;    CYSTOSCOPY N/A 7/13/2021    Procedure: CYSTOSCOPY;  Surgeon: Shireen Mayo MD;  Location: [facility] OR 1ST FLR;  Service: Urology;  Laterality: N/A;    CYSTOSCOPY  11/16/2021    Procedure: CYSTOSCOPY;  Surgeon: Shireen Mayo MD;  [facility];  Service: Urology;;    CYSTOSCOPY  7/19/2022    Procedure: CYSTOSCOPY;  Surgeon: Shireen Mayo MD;  [facility];  Service: Urology;;    CYSTOSCOPY WITH INJECTION OF PERIURETHRAL BULKING AGENT  7/19/2022    Procedure: CYSTOSCOPY, WITH PERIURETHRAL BULKING AGENT INJECTION-MACROPLASTIQUE;  Surgeon: Shireen Mayo MD;  [facility];  Service: Urology;;    ESOPHAGOGASTRODUODENOSCOPY N/A 5/23/2018    Procedure: ESOPHAGOGASTRODUODENOSCOPY (EGD);  Surgeon: Prince Vance MD;  Location: [facility] (4TH FLR);  Service: Endoscopy;  Laterality: N/A;  r/s 'd per Dr. Vance due to family emergency- ER    HYSTERECTOMY  1975    endometriosis    INJECTION OF BOTULINUM TOXIN TYPE A  7/13/2021    Procedure: INJECTION, BOTULINUM TOXIN, 200units;  Surgeon: Shireen Mayo MD;  Location: [facility] OR 1ST FLR;  Service: Urology;;    INJECTION OF BOTULINUM TOXIN TYPE A  11/16/2021    Procedure: INJECTION, BOTULINUM TOXIN, 200units;  Surgeon: Shireen Mayo MD;  Location: [facility] OR 1ST FLR;  Service: Urology;;    INJECTION OF BOTULINUM TOXIN TYPE A  7/19/2022    Procedure: INJECTION,  BOTULINUM TOXIN, 300 units ;  Surgeon: Shireen Mayo MD;  Location: [facility] OR [address] [place] [address];  Service: Urology;;    pain pump placement      SQ Dilaudid Pump managed by Dr. Hillman, Pain Management    REMOVAL OF BONE SPUR OF FOOT Bilateral 9/16/2022    Procedure: EXCISION ARTHRITIC BONE, BILATERAL FOOT;  Surgeon: Adam Mcguire [facility];  Location: [facility] OR;  Service: Podiatry;  Laterality: Bilateral;    REPLACEMENT OF CATHETER N/A 10/31/2019    Procedure: REPLACEMENT, CATHETER-SUPRAPUBIC;  Surgeon: Shireen Mayo MD;  Location: [facility] OR [address] [place] [address];  Service: Urology;  Laterality: N/A;    SPINAL CORD STIMULATOR REMOVAL      before Larissa    SPINE SURGERY  5-13-13    CERVICAL FUSION    TONSILLECTOMY         Review of patient's allergies indicates:   Allergen Reactions    (d)-limonene flavor      Other reaction(s): difficult intubation  Other reaction(s): Difficulty breathing    Bactrim [sulfamethoxazole-trimethoprim] Anaphylaxis    Benadryl [diphenhydramine hcl] Shortness Of Breath    Fentanyl Itching, Nausea And Vomiting and Swelling             Imitrex [sumatriptan succinate] Shortness Of Breath    Percocet [oxycodone-acetaminophen] Shortness Of Breath    Topamax [topiramate] Shortness Of Breath    Vancomycin Shortness Of Breath     Rash    Butorphanol tartrate     Darvocet a500 [propoxyphene n-acetaminophen]      Other reaction(s): Difficulty breathing    Evening primrose (oenothera biennis)     White petrolatum-zinc     Zinc oxide-white petrolatum      Other reaction(s): Difficulty breathing    Latex, natural rubber Itching and Rash    Phenytoin Rash and Other (See Comments)     Trouble breathing       Family History       Problem Relation (Age of Onset)    Cancer Mother (55), Father    Cirrhosis Paternal Aunt, Paternal Uncle    Colon cancer Maternal Uncle    Esophageal cancer Father    Heart disease Maternal Uncle    Liver disease Paternal Aunt, Paternal Uncle    No Known Problems Brother, Brother, Sister,  Maternal Aunt, Maternal Grandfather, Paternal Grandmother, Paternal Grandfather    Parkinsonism Maternal Grandmother    Tremor Maternal Grandmother            Tobacco Use    Smoking status: Never    Smokeless tobacco: Never   Substance and Sexual Activity    Alcohol use: Never    Drug use: No    Sexual activity: Never     Partners: Male       Review of Systems   Constitutional:  Negative for activity change, appetite change, chills, fever and unexpected weight change.   Respiratory:  Negative for shortness of breath.    Cardiovascular:  Negative for chest pain.   Gastrointestinal:  Negative for abdominal pain, constipation, diarrhea, nausea and vomiting.   Genitourinary:  Negative for dysuria, flank pain and hematuria.   Musculoskeletal:  Positive for back pain.   Skin:  Negative for wound.   Neurological:  Negative for headaches.   Psychiatric/Behavioral:  Negative for confusion.      Objective:     Temp:  [98.1 °F (36.7 °C)-98.2 °F (36.8 °C)] 98.1 °F (36.7 °C)  Pulse:  [55-56] 55  Resp:  [18-20] 20  SpO2:  [97 %-98 %] 98 %  BP: (100-105)/(50-60) 100/60     Body mass index is 30.54 kg/m².           Drains       Drain  Duration                  Suprapubic Catheter 07/19/22 1015 100% silicone 20 Fr. 134 days                    Physical Exam  Vitals reviewed.   Constitutional:       General: She is not in acute distress.     Appearance: She is not diaphoretic.   HENT:      Head: Normocephalic and atraumatic.      Nose: Nose normal.   Eyes:      Conjunctiva/sclera: Conjunctivae normal.   Cardiovascular:      Rate and Rhythm: Normal rate.   Pulmonary:      Effort: Pulmonary effort is normal. No respiratory distress.   Abdominal:      General: There is no distension.      Palpations: Abdomen is soft.      Tenderness: There is no right CVA tenderness, left CVA tenderness, guarding or rebound.      Comments: SPT tract site erythematous and swollen. No signs of infection.   Musculoskeletal:         General: Normal range of  motion.      Cervical back: Normal range of motion.   Skin:     General: Skin is dry.   Neurological:      Mental Status: She is alert.   Psychiatric:         Behavior: Behavior normal.         Thought Content: Thought content normal.       Significant Labs:    BMP:  No results for input(s): NA, K, CL, CO2, BUN, CREATININE, LABGLOM, GLUCOSE, CALCIUM in the last 168 hours.    CBC:  Recent Labs   Lab 11/30/22  1416   HCT 28*       All pertinent labs results from the past 24 hours have been reviewed.    Significant Imaging:  All pertinent imaging results/findings from the past 24 hours have been reviewed.                      Assessment and Plan:     Suprapubic catheter  66 yo F with Neurogenic bladder managed by SPT. Patient presents to ED due to inability to replace SPT after removal this morning.     - 14Fr coude Motley catheter placed without issue with return of 150cc of clear yellow urine.   - Send urine for culture.   - Ok for discharge from Urology perspective.   - Follow up as planned for cystoscopy with bladder Botox injections with Dr. Mayo on 12/13/22.         VTE Risk Mitigation (From admission, onward)      None            Thank you for your consult. I will sign off. Please contact us if you have any additional questions.    Iliana Llanos MD  Urology  [facility] - Emergency Dept    As above.

## 2022-11-30 NOTE — TELEPHONE ENCOUNTER
----- Message from Cora Garcia sent at 11/30/2022 10:01 AM CST -----  Regarding: Pt Advice  Contact: Sandee [phone]  Sandee Rn with [facility] is calling to see if her catether can be reinserted today.  They took it out but is unable to put it back in.  Please call, very urgent.

## 2022-11-30 NOTE — ASSESSMENT & PLAN NOTE
68 yo F with Neurogenic bladder managed by SPT. Patient presents to ED due to inability to replace SPT after removal this morning.     - 14Fr coude Motley catheter placed without issue with return of 150cc of clear yellow urine.   - Send urine for culture.   - Ok for discharge from Urology perspective.   - Follow up as planned for cystoscopy with bladder Botox injections with Dr. Mayo on 12/13/22.

## 2022-11-30 NOTE — ED NOTES
Tasha Hawley, a 67 y.o. female presents to the ED w/ complaint of unable to get suprapubic cath replaced by home health nurse. No complaints. Halley akbaro x 4 resp even and unlabored.     Triage note:  Chief Complaint   Patient presents with    Wound Check     Suprapubic cath removed today to be changed, sent to ed for swelling at site, home health nurse unable to get back in     Review of patient's allergies indicates:   Allergen Reactions    (d)-limonene flavor      Other reaction(s): difficult intubation  Other reaction(s): Difficulty breathing    Bactrim [sulfamethoxazole-trimethoprim] Anaphylaxis    Benadryl [diphenhydramine hcl] Shortness Of Breath    Fentanyl Itching, Nausea And Vomiting and Swelling             Imitrex [sumatriptan succinate] Shortness Of Breath    Percocet [oxycodone-acetaminophen] Shortness Of Breath    Topamax [topiramate] Shortness Of Breath    Vancomycin Shortness Of Breath     Rash    Butorphanol tartrate     Darvocet a500 [propoxyphene n-acetaminophen]      Other reaction(s): Difficulty breathing    Evening primrose (oenothera biennis)     White petrolatum-zinc     Zinc oxide-white petrolatum      Other reaction(s): Difficulty breathing    Latex, natural rubber Itching and Rash    Phenytoin Rash and Other (See Comments)     Trouble breathing     Past Medical History:   Diagnosis Date    Anticoagulant long-term use     Anxiety     Arthritis     Bilateral lower extremity edema     severe chronic    Carotid artery occlusion     Cataract     CHF (congestive heart failure)     Coronary artery disease     subtotalled LAD with collateral    Depression     Fever blister     Hypothyroid     Iron deficiency anemia     Lumbar radiculopathy     with chronic pain    Ocular migraine     Renal disorder     Sleep apnea     cpap

## 2022-11-30 NOTE — HPI
68 yo F with a PMH of CAD, lumbar radiculopathy, LO, and Neurogenic bladder managed by suprapubic catheter presenting to the ED with difficult SPT exchange. Home health came for a routine suprapubic catheter exchange this morning. The catheter was removed around 9am this morning and the nurse was unable to replace it due to swelling of the SPT tract site. Since the catheter has been removed the patient reports leakage of urine per urethra. The patient reports increased urinary leakage over the past couple weeks. She is scheduled for cystoscopy and bladder Botox injection on 12/13/22 with Dr. Mayo. Patient denies fevers, chills, nausea, vomiting, flank pain, gross hematuria, dysuria.     In the ED, patient is resting comfortably in bed. She is afebrile with normal vitals. Cr 1.1 (baseline ~0.9-1.1). 14Fr coude tipped Motley catheter placed without issue with return of 150cc of clear yellow urine.

## 2022-12-01 LAB
BACTERIA UR CULT: NORMAL
BACTERIA UR CULT: NORMAL

## 2022-12-02 ENCOUNTER — TELEPHONE (OUTPATIENT)
Dept: UROLOGY | Facility: CLINIC | Age: 67
End: 2022-12-02
Payer: MEDICARE

## 2022-12-02 ENCOUNTER — APPOINTMENT (OUTPATIENT)
Dept: LAB | Facility: HOSPITAL | Age: 67
End: 2022-12-02
Attending: UROLOGY
Payer: MEDICARE

## 2022-12-02 DIAGNOSIS — N39.0 UTI (URINARY TRACT INFECTION): Primary | ICD-10-CM

## 2022-12-02 LAB
BILIRUB UR QL STRIP: NEGATIVE
CLARITY UR REFRACT.AUTO: CLEAR
COLOR UR AUTO: YELLOW
GLUCOSE UR QL STRIP: NEGATIVE
HGB UR QL STRIP: ABNORMAL
KETONES UR QL STRIP: NEGATIVE
LEUKOCYTE ESTERASE UR QL STRIP: ABNORMAL
MICROSCOPIC COMMENT: ABNORMAL
NITRITE UR QL STRIP: NEGATIVE
PH UR STRIP: 7 [PH] (ref 5–8)
PROT UR QL STRIP: NEGATIVE
RBC #/AREA URNS AUTO: 25 /HPF (ref 0–4)
SP GR UR STRIP: 1.01 (ref 1–1.03)
URN SPEC COLLECT METH UR: ABNORMAL
UROBILINOGEN UR STRIP-ACNC: NEGATIVE EU/DL
WBC #/AREA URNS AUTO: 5 /HPF (ref 0–5)

## 2022-12-02 PROCEDURE — 87186 SC STD MICRODIL/AGAR DIL: CPT | Mod: PO | Performed by: UROLOGY

## 2022-12-02 PROCEDURE — 87088 URINE BACTERIA CULTURE: CPT | Mod: PO | Performed by: UROLOGY

## 2022-12-02 PROCEDURE — 87086 URINE CULTURE/COLONY COUNT: CPT | Mod: PO | Performed by: UROLOGY

## 2022-12-02 PROCEDURE — 81000 URINALYSIS NONAUTO W/SCOPE: CPT | Mod: PO | Performed by: UROLOGY

## 2022-12-02 PROCEDURE — 87077 CULTURE AEROBIC IDENTIFY: CPT | Mod: PO | Performed by: UROLOGY

## 2022-12-02 NOTE — TELEPHONE ENCOUNTER
----- Message from Valentino Esquivel sent at 12/2/2022  8:15 AM CST -----  Contact: pt  Pt requesting call back RE: Pt states that she needs to get in today. Please call         Confirmed contact below:  Contact Name:Tasha Hawley  Phone Number: [phone]

## 2022-12-02 NOTE — TELEPHONE ENCOUNTER
Spoke w/ Rigoberto Esquivel at [facility]. Gave v/o for SN to go to collect urine directly from catheter, that was changed in ER on 11/30/22, for urine culture. No need to change catheter.

## 2022-12-05 LAB
BACTERIA UR CULT: ABNORMAL
BACTERIA UR CULT: ABNORMAL

## 2022-12-07 ENCOUNTER — TELEPHONE (OUTPATIENT)
Dept: UROLOGY | Facility: CLINIC | Age: 67
End: 2022-12-07
Payer: MEDICARE

## 2022-12-07 NOTE — TELEPHONE ENCOUNTER
----- Message from Jane Glover sent at 12/7/2022 12:27 PM CST -----  Regarding: Pt adv  Contact: [phone]  Pt calling requesting to speak with Dr. Mayo in regards to her having leakage and inflammation in urine. Please call and adv @ [phone]

## 2022-12-09 ENCOUNTER — PATIENT MESSAGE (OUTPATIENT)
Dept: UROLOGY | Facility: CLINIC | Age: 67
End: 2022-12-09
Payer: MEDICARE

## 2022-12-09 ENCOUNTER — TELEPHONE (OUTPATIENT)
Dept: UROLOGY | Facility: CLINIC | Age: 67
End: 2022-12-09
Payer: MEDICARE

## 2022-12-09 DIAGNOSIS — N30.90 BLADDER INFECTION: Primary | ICD-10-CM

## 2022-12-09 RX ORDER — DICLOXACILLIN SODIUM 500 MG/1
500 CAPSULE ORAL 4 TIMES DAILY
Qty: 20 CAPSULE | Refills: 0 | Status: ON HOLD | OUTPATIENT
Start: 2022-12-09 | End: 2022-12-13 | Stop reason: SDUPTHER

## 2022-12-09 RX ORDER — NITROFURANTOIN 25; 75 MG/1; MG/1
100 CAPSULE ORAL 2 TIMES DAILY
Qty: 14 CAPSULE | Refills: 0 | Status: ON HOLD | OUTPATIENT
Start: 2022-12-09 | End: 2022-12-13 | Stop reason: SDUPTHER

## 2022-12-12 ENCOUNTER — ANESTHESIA EVENT (OUTPATIENT)
Dept: SURGERY | Facility: HOSPITAL | Age: 67
End: 2022-12-12
Payer: MEDICARE

## 2022-12-12 ENCOUNTER — DOCUMENT SCAN (OUTPATIENT)
Dept: HOME HEALTH SERVICES | Facility: HOSPITAL | Age: 67
End: 2022-12-12
Payer: MEDICARE

## 2022-12-12 ENCOUNTER — TELEPHONE (OUTPATIENT)
Dept: UROLOGY | Facility: CLINIC | Age: 67
End: 2022-12-12
Payer: MEDICARE

## 2022-12-12 NOTE — TELEPHONE ENCOUNTER
Called pt to confirm arrival time of 6am for procedure on 12/13/2022. Gave pt NPO instructions and gave pt opportunity to ask questions. Pt verbalized understanding.

## 2022-12-13 ENCOUNTER — HOSPITAL ENCOUNTER (OUTPATIENT)
Facility: HOSPITAL | Age: 67
Discharge: HOME | End: 2022-12-13
Attending: UROLOGY | Admitting: UROLOGY
Payer: MEDICARE

## 2022-12-13 ENCOUNTER — ANESTHESIA (OUTPATIENT)
Dept: SURGERY | Facility: HOSPITAL | Age: 67
End: 2022-12-13
Payer: MEDICARE

## 2022-12-13 VITALS
DIASTOLIC BLOOD PRESSURE: 54 MMHG | SYSTOLIC BLOOD PRESSURE: 94 MMHG | OXYGEN SATURATION: 93 % | HEIGHT: 67 IN | RESPIRATION RATE: 20 BRPM | BODY MASS INDEX: 30.54 KG/M2 | TEMPERATURE: 98 F | HEART RATE: 58 BPM

## 2022-12-13 DIAGNOSIS — N31.9 NEUROGENIC BLADDER: Primary | Chronic | ICD-10-CM

## 2022-12-13 DIAGNOSIS — N32.81 OAB (OVERACTIVE BLADDER): ICD-10-CM

## 2022-12-13 DIAGNOSIS — N30.90 BLADDER INFECTION: ICD-10-CM

## 2022-12-13 PROCEDURE — 51705 PR CHANGE OF BLADDER TUBE,SIMPLE: ICD-10-PCS | Mod: 51,,, | Performed by: UROLOGY

## 2022-12-13 PROCEDURE — 37000009 HC ANESTHESIA EA ADD 15 MINS: Performed by: UROLOGY

## 2022-12-13 PROCEDURE — D9220A PRA ANESTHESIA: Mod: CRNA,,, | Performed by: REGISTERED NURSE

## 2022-12-13 PROCEDURE — 51705 CHANGE OF BLADDER TUBE: CPT | Mod: 51,,, | Performed by: UROLOGY

## 2022-12-13 PROCEDURE — D9220A PRA ANESTHESIA: Mod: ANES,,, | Performed by: ANESTHESIOLOGY

## 2022-12-13 PROCEDURE — D9220A PRA ANESTHESIA: ICD-10-PCS | Mod: CRNA,,, | Performed by: REGISTERED NURSE

## 2022-12-13 PROCEDURE — 36000706: Performed by: UROLOGY

## 2022-12-13 PROCEDURE — 63600175 PHARM REV CODE 636 W HCPCS

## 2022-12-13 PROCEDURE — 25000003 PHARM REV CODE 250: Performed by: UROLOGY

## 2022-12-13 PROCEDURE — 25000003 PHARM REV CODE 250

## 2022-12-13 PROCEDURE — 63600175 PHARM REV CODE 636 W HCPCS: Performed by: REGISTERED NURSE

## 2022-12-13 PROCEDURE — 71000015 HC POSTOP RECOV 1ST HR: Performed by: UROLOGY

## 2022-12-13 PROCEDURE — 37000008 HC ANESTHESIA 1ST 15 MINUTES: Performed by: UROLOGY

## 2022-12-13 PROCEDURE — 52287 CYSTOSCOPY CHEMODENERVATION: CPT | Mod: ,,, | Performed by: UROLOGY

## 2022-12-13 PROCEDURE — 71000044 HC DOSC ROUTINE RECOVERY FIRST HOUR: Performed by: UROLOGY

## 2022-12-13 PROCEDURE — 36000707: Performed by: UROLOGY

## 2022-12-13 PROCEDURE — 63600175 PHARM REV CODE 636 W HCPCS: Mod: JG | Performed by: UROLOGY

## 2022-12-13 PROCEDURE — 63600175 PHARM REV CODE 636 W HCPCS: Performed by: ANESTHESIOLOGY

## 2022-12-13 PROCEDURE — D9220A PRA ANESTHESIA: ICD-10-PCS | Mod: ANES,,, | Performed by: ANESTHESIOLOGY

## 2022-12-13 PROCEDURE — 52287 PR CYSTOURETHROSCOPY WITH INJ FOR CHEMODENERVATION: ICD-10-PCS | Mod: ,,, | Performed by: UROLOGY

## 2022-12-13 PROCEDURE — 25000003 PHARM REV CODE 250: Performed by: REGISTERED NURSE

## 2022-12-13 RX ORDER — ONDANSETRON 2 MG/ML
4 INJECTION INTRAMUSCULAR; INTRAVENOUS ONCE
Status: COMPLETED | OUTPATIENT
Start: 2022-12-13 | End: 2022-12-13

## 2022-12-13 RX ORDER — DICLOXACILLIN SODIUM 500 MG/1
500 CAPSULE ORAL 4 TIMES DAILY
Qty: 12 CAPSULE | Refills: 0 | Status: SHIPPED | OUTPATIENT
Start: 2022-12-13 | End: 2022-12-13 | Stop reason: SDUPTHER

## 2022-12-13 RX ORDER — PROPOFOL 10 MG/ML
VIAL (ML) INTRAVENOUS
Status: DISCONTINUED | OUTPATIENT
Start: 2022-12-13 | End: 2022-12-13

## 2022-12-13 RX ORDER — PROPOFOL 10 MG/ML
INJECTION, EMULSION INTRAVENOUS CONTINUOUS PRN
Status: DISCONTINUED | OUTPATIENT
Start: 2022-12-13 | End: 2022-12-13

## 2022-12-13 RX ORDER — GENTAMICIN SULFATE 100 MG/100ML
240 INJECTION, SOLUTION INTRAVENOUS
Status: COMPLETED | OUTPATIENT
Start: 2022-12-13 | End: 2022-12-13

## 2022-12-13 RX ORDER — ONDANSETRON 2 MG/ML
INJECTION INTRAMUSCULAR; INTRAVENOUS
Status: DISCONTINUED | OUTPATIENT
Start: 2022-12-13 | End: 2022-12-13

## 2022-12-13 RX ORDER — ONDANSETRON 2 MG/ML
INJECTION INTRAMUSCULAR; INTRAVENOUS
Status: DISCONTINUED
Start: 2022-12-13 | End: 2022-12-13 | Stop reason: HOSPADM

## 2022-12-13 RX ORDER — ONDANSETRON 2 MG/ML
4 INJECTION INTRAMUSCULAR; INTRAVENOUS DAILY PRN
Status: DISCONTINUED | OUTPATIENT
Start: 2022-12-13 | End: 2022-12-13 | Stop reason: HOSPADM

## 2022-12-13 RX ORDER — MIDAZOLAM HYDROCHLORIDE 1 MG/ML
INJECTION, SOLUTION INTRAMUSCULAR; INTRAVENOUS
Status: DISCONTINUED | OUTPATIENT
Start: 2022-12-13 | End: 2022-12-13

## 2022-12-13 RX ORDER — NITROFURANTOIN 25; 75 MG/1; MG/1
100 CAPSULE ORAL 2 TIMES DAILY
Qty: 14 CAPSULE | Refills: 0 | Status: SHIPPED | OUTPATIENT
Start: 2022-12-13 | End: 2022-12-16

## 2022-12-13 RX ORDER — SODIUM CHLORIDE 9 MG/ML
INJECTION, SOLUTION INTRAVENOUS CONTINUOUS
Status: DISCONTINUED | OUTPATIENT
Start: 2022-12-13 | End: 2022-12-13 | Stop reason: HOSPADM

## 2022-12-13 RX ORDER — LIDOCAINE HYDROCHLORIDE 20 MG/ML
JELLY TOPICAL
Status: DISCONTINUED | OUTPATIENT
Start: 2022-12-13 | End: 2022-12-13 | Stop reason: HOSPADM

## 2022-12-13 RX ORDER — NITROFURANTOIN 25; 75 MG/1; MG/1
100 CAPSULE ORAL 2 TIMES DAILY
Qty: 14 CAPSULE | Refills: 0 | Status: SHIPPED | OUTPATIENT
Start: 2022-12-13 | End: 2022-12-13 | Stop reason: SDUPTHER

## 2022-12-13 RX ORDER — DICLOXACILLIN SODIUM 500 MG/1
500 CAPSULE ORAL 4 TIMES DAILY
Qty: 12 CAPSULE | Refills: 0 | Status: SHIPPED | OUTPATIENT
Start: 2022-12-13 | End: 2022-12-16

## 2022-12-13 RX ORDER — KETAMINE HCL IN 0.9 % NACL 50 MG/5 ML
SYRINGE (ML) INTRAVENOUS
Status: DISCONTINUED | OUTPATIENT
Start: 2022-12-13 | End: 2022-12-13

## 2022-12-13 RX ORDER — LIDOCAINE HYDROCHLORIDE 20 MG/ML
INJECTION INTRAVENOUS
Status: DISCONTINUED | OUTPATIENT
Start: 2022-12-13 | End: 2022-12-13

## 2022-12-13 RX ADMIN — Medication 10 MG: at 08:12

## 2022-12-13 RX ADMIN — AMPICILLIN 1 G: 1 INJECTION, POWDER, FOR SOLUTION INTRAMUSCULAR; INTRAVENOUS at 07:12

## 2022-12-13 RX ADMIN — SODIUM CHLORIDE: 0.9 INJECTION, SOLUTION INTRAVENOUS at 07:12

## 2022-12-13 RX ADMIN — GLYCOPYRROLATE 0.2 MG: 0.2 INJECTION, SOLUTION INTRAMUSCULAR; INTRAVENOUS at 07:12

## 2022-12-13 RX ADMIN — PROPOFOL 50 MG: 10 INJECTION, EMULSION INTRAVENOUS at 07:12

## 2022-12-13 RX ADMIN — GENTAMICIN SULFATE 240 MG: 40 INJECTION, SOLUTION INTRAMUSCULAR; INTRAVENOUS at 07:12

## 2022-12-13 RX ADMIN — PROPOFOL 125 MCG/KG/MIN: 10 INJECTION, EMULSION INTRAVENOUS at 07:12

## 2022-12-13 RX ADMIN — MIDAZOLAM HYDROCHLORIDE 2 MG: 1 INJECTION, SOLUTION INTRAMUSCULAR; INTRAVENOUS at 07:12

## 2022-12-13 RX ADMIN — ONDANSETRON 4 MG: 2 INJECTION INTRAMUSCULAR; INTRAVENOUS at 07:12

## 2022-12-13 RX ADMIN — LIDOCAINE HYDROCHLORIDE 40 MG: 20 INJECTION INTRAVENOUS at 07:12

## 2022-12-13 RX ADMIN — ONDANSETRON 4 MG: 2 INJECTION INTRAMUSCULAR; INTRAVENOUS at 08:12

## 2022-12-13 RX ADMIN — Medication 20 MG: at 07:12

## 2022-12-13 NOTE — ANESTHESIA PREPROCEDURE EVALUATION
12/12/2022  Tasha Hawley is a 67 y.o., female with urinary incontinence here for botox injection.    Pre-operative evaluation for Procedure(s) (LRB):  CYSTOSCOPY,WITH BOTULINUM TOXIN INJECTION (N/A)    Tasha Hawley is a 67 y.o. female     Patient Active Problem List   Diagnosis    Generalized anxiety disorder    Sleep apnea    Iron deficiency anemia    Major depressive disorder, recurrent, mild    Chronic pain syndrome    Cervical myelopathy    Narcotic dependency, continuous    CHF (congestive heart failure)    Thoracic aorta atherosclerosis    Drug-induced constipation    GERD (gastroesophageal reflux disease)    Coronary artery disease of native artery of native heart with stable angina pectoris    Neurogenic bladder    Urinary retention    Frequent falls    Primary hypothyroidism    Lymphedema of both lower extremities    Scoliosis deformity of spine    S/P insertion of spinal cord stimulator    S/P insertion of intrathecal pump    Paresthesia of both lower extremities    Degenerative disc disease, lumbar    Osteoarthritis of spine with radiculopathy, lumbar region    Facet arthropathy, lumbar    Bradycardia    Bilateral carotid artery disease    Insomnia due to medical condition    Suprapubic catheter    Esophageal dysphagia    Recurrent UTI    Mixed stress and urge urinary incontinence    Encounter for suprapubic catheter care    Inflammatory spondylopathy of lumbar region    Pure hypercholesterolemia    Chronic diastolic heart failure    Partial small bowel obstruction    Constipation due to opioid therapy    Pain in both lower legs    Urinary tract infection associated with cystostomy catheter    Impaired functional mobility and activity tolerance    Weakness    History of stroke with residual deficit    Tremor    Cellulitis    Hyponatremia     Anxiety    Abdominal pain    Staph aureus infection    Known medical problems    Preoperative clearance    COVID    Calcaneal spur of left foot    Left foot pain    Charcot ankle, unspecified laterality       Review of patient's allergies indicates:   Allergen Reactions    (d)-limonene flavor      Other reaction(s): difficult intubation  Other reaction(s): Difficulty breathing    Bactrim [sulfamethoxazole-trimethoprim] Anaphylaxis    Benadryl [diphenhydramine hcl] Shortness Of Breath    Fentanyl Itching, Nausea And Vomiting and Swelling             Imitrex [sumatriptan succinate] Shortness Of Breath    Percocet [oxycodone-acetaminophen] Shortness Of Breath    Topamax [topiramate] Shortness Of Breath    Vancomycin Shortness Of Breath     Rash    Butorphanol tartrate     Darvocet a500 [propoxyphene n-acetaminophen]      Other reaction(s): Difficulty breathing    Evening primrose (oenothera biennis)     White petrolatum-zinc     Zinc oxide-white petrolatum      Other reaction(s): Difficulty breathing    Latex, natural rubber Itching and Rash    Phenytoin Rash and Other (See Comments)     Trouble breathing       No current facility-administered medications on file prior to encounter.     Current Outpatient Medications on File Prior to Encounter   Medication Sig Dispense Refill    acyclovir 5% (ZOVIRAX) 5 % ointment Apply topically 5 (five) times daily. (Patient not taking: Reported on 9/22/2022) 15 g 0    aspirin (ECOTRIN) 81 MG EC tablet Take 1 tablet (81 mg total) by mouth once daily. 30 tablet 0    atorvastatin (LIPITOR) 80 MG tablet TAKE ONE TABLET BY MOUTH EVERY DAY 90 tablet 3    butalbital-acetaminophen-caffeine -40 mg (FIORICET, ESGIC) -40 mg per tablet Take 1 tablet by mouth daily as needed. 15 tablet 0    FLUoxetine 20 MG capsule Take 3 capsules (60 mg total) by mouth once daily. 270 capsule 0    furosemide (LASIX) 40 MG tablet Take 1 tablet (40 mg total) by mouth once  daily. (Patient not taking: Reported on 9/22/2022) 30 tablet 0    HYDROcodone-acetaminophen (NORCO)  mg per tablet Take by mouth every 24 hours as needed.      HYDROcodone-acetaminophen (NORCO)  mg per tablet Take 1 tablet by mouth every 6 (six) hours as needed for Pain. 20 tablet 0    intrathecal pain pump compound Hydromorphone (7.5 mg/mL) infusion at 3.6799 mg/day (0.1533 mg/hr) out of a total reservoir volume of 37.3 mL  Pump filled every 2 months      levothyroxine (SYNTHROID) 137 MCG Tab tablet Take 1 tablet (137 mcg total) by mouth before breakfast. 30 tablet 0    nitroGLYCERIN (NITROSTAT) 0.4 MG SL tablet Place 1 tablet (0.4 mg total) under the tongue every 5 (five) minutes as needed for Chest pain. 25 tablet 3    pantoprazole (PROTONIX) 40 MG tablet TAKE ONE TABLET BY MOUTH EVERY DAY (Patient taking differently: Take by mouth every morning.) 90 tablet 3    potassium chloride SA (K-DUR,KLOR-CON) 20 MEQ tablet Take 1 tablet (20 mEq total) by mouth 2 (two) times daily. 180 tablet 3    promethazine (PHENERGAN) 25 MG tablet Take 25 mg by mouth every 6 (six) hours as needed for Nausea.      QUEtiapine (SEROQUEL) 100 MG Tab TAKE 2 TABLETS (200 MG) BY MOUTH NIGHTLY 180 tablet 0    QUEtiapine (SEROQUEL) 25 MG Tab Take 12.5-25 mg twice daily as needed for anxiety 90 tablet 0    senna (SENNA LAX) 8.6 mg tablet Take 2 tablets by mouth 2 (two) times daily.      tiZANidine (ZANAFLEX) 4 MG tablet Take 4 mg by mouth 2 (two) times daily.      traZODone (DESYREL) 300 MG tablet Take 1 tablet (300 mg total) by mouth every evening. 90 tablet 0    [DISCONTINUED] torsemide (DEMADEX) 100 MG Tab TAKE ONE TABLET BY MOUTH EVERY DAY 90 tablet 0       Past Surgical History:   Procedure Laterality Date    ADENOIDECTOMY      BACK SURGERY      x 12    CARDIAC CATHETERIZATION  2016    subtotalled LAD with right to left collaterals    CATARACT EXTRACTION W/  INTRAOCULAR LENS IMPLANT Left     Dr Coleman      CYSTOSCOPIC LITHOLAPAXY N/A 6/27/2019    Procedure: CYSTOLITHOLAPAXY;  Surgeon: Shireen Mayo MD;  Location: [facility] OR 2ND FLR;  Service: Urology;  Laterality: N/A;    CYSTOSCOPIC LITHOLAPAXY N/A 9/3/2019    Procedure: CYSTOLITHOLAPAXY;  Surgeon: Shireen Mayo MD;  Location: [facility] OR 2ND FLR;  Service: Urology;  Laterality: N/A;    CYSTOSCOPY N/A 7/13/2021    Procedure: CYSTOSCOPY;  Surgeon: Shireen Mayo MD;  Location: [facility] OR 1ST FLR;  Service: Urology;  Laterality: N/A;    CYSTOSCOPY  11/16/2021    Procedure: CYSTOSCOPY;  Surgeon: Shireen Mayo MD;  Location: [facility] OR 1ST FLR;  Service: Urology;;    CYSTOSCOPY  7/19/2022    Procedure: CYSTOSCOPY;  Surgeon: Shireen Mayo MD;  Location: [facility] OR 1ST FLR;  Service: Urology;;    CYSTOSCOPY WITH INJECTION OF PERIURETHRAL BULKING AGENT  7/19/2022    Procedure: CYSTOSCOPY, WITH PERIURETHRAL BULKING AGENT INJECTION-MACROPLASTIQUE;  Surgeon: Shireen Mayo MD;  Location: [facility] OR 1ST FLR;  Service: Urology;;    ESOPHAGOGASTRODUODENOSCOPY N/A 5/23/2018    Procedure: ESOPHAGOGASTRODUODENOSCOPY (EGD);  Surgeon: Prince Vance MD;  Location: [facility] (4TH FLR);  Service: Endoscopy;  Laterality: N/A;  r/s 'd per Dr. Vance due to family emergency- ER    HYSTERECTOMY  1975    endometriosis    INJECTION OF BOTULINUM TOXIN TYPE A  7/13/2021    Procedure: INJECTION, BOTULINUM TOXIN, 200units;  Surgeon: Shireen Mayo MD;  Location: [facility] OR 1ST FLR;  Service: Urology;;    INJECTION OF BOTULINUM TOXIN TYPE A  11/16/2021    Procedure: INJECTION, BOTULINUM TOXIN, 200units;  Surgeon: Shireen Mayo MD;  Location: [facility] OR 1ST FLR;  Service: Urology;;    INJECTION OF BOTULINUM TOXIN TYPE A  7/19/2022    Procedure: INJECTION, BOTULINUM TOXIN, 300 units ;  Surgeon: Shireen Mayo MD;  Location: [facility] OR [address] [place] [address];  Service: Urology;;    pain pump placement      SQ Dilaudid Pump managed by Dr. Hillman, Pain Management    REMOVAL OF BONE SPUR OF FOOT  Bilateral 9/16/2022    Procedure: EXCISION ARTHRITIC BONE, BILATERAL FOOT;  Surgeon: Adam Mcguire DPM;  Location: [facility] OR;  Service: Podiatry;  Laterality: Bilateral;    REPLACEMENT OF CATHETER N/A 10/31/2019    Procedure: REPLACEMENT, CATHETER-SUPRAPUBIC;  Surgeon: Shireen Mayo MD;  Location: [facility] OR [address] [place] [address];  Service: Urology;  Laterality: N/A;    SPINAL CORD STIMULATOR REMOVAL      before [place]    SPINE SURGERY  5-13-13    CERVICAL FUSION    TONSILLECTOMY         Social History     Socioeconomic History    Marital status:    Tobacco Use    Smoking status: Never    Smokeless tobacco: Never   Substance and Sexual Activity    Alcohol use: Never    Drug use: No    Sexual activity: Never     Partners: Male     Social Determinants of Health     Financial Resource Strain: Low Risk     Difficulty of Paying Living Expenses: Not very hard   Food Insecurity: No Food Insecurity    Worried About Running Out of Food in the Last Year: Never true    Ran Out of Food in the Last Year: Never true   Transportation Needs: No Transportation Needs    Lack of Transportation (Medical): No    Lack of Transportation (Non-Medical): No   Physical Activity: Inactive    Days of Exercise per Week: 0 days    Minutes of Exercise per Session: 0 min   Housing Stability: Low Risk     Unable to Pay for Housing in the Last Year: No    Number of Places Lived in the Last Year: 1    Unstable Housing in the Last Year: No         CBC: No results for input(s): WBC, RBC, HGB, HCT, PLT, MCV, MCH, MCHC in the last 72 hours.    CMP: No results for input(s): NA, K, CL, CO2, BUN, CREATININE, GLU, MG, PHOS, CALCIUM, ALBUMIN, PROT, ALKPHOS, ALT, AST, BILITOT in the last 72 hours.    INR  No results for input(s): PT, INR, PROTIME, APTT in the last 72 hours.            2D Echo:  No results found. However, due to the size of the patient record, not all encounters were searched. Please check Results Review for a complete set  of results.        Pre-op Assessment    I have reviewed the Patient Summary Reports.     I have reviewed the Nursing Notes.    I have reviewed the Medications.     Review of Systems  Anesthesia Hx:  No problems with previous Anesthesia    Hematology/Oncology:  Hematology Normal   Oncology Normal     EENT/Dental:EENT/Dental Normal   Cardiovascular:  Cardiovascular Normal CAD       Pulmonary:  Pulmonary Normal    Renal/:  Renal/ Normal     Hepatic/GI:  Hepatic/GI Normal    Musculoskeletal:  Musculoskeletal Normal    Neurological:   Neuromuscular Disease, Headaches    Endocrine:  Endocrine Normal    Dermatological:  Skin Normal    Psych:  Psychiatric Normal           Physical Exam  General: Well nourished    Airway:  Mallampati: II   Mouth Opening: Normal  TM Distance: Normal  Tongue: Normal  Neck ROM: Normal ROM    Dental:  Intact    Chest/Lungs:  Clear to auscultation, Normal Respiratory Rate    Heart:  Rate: Normal  Rhythm: Regular Rhythm  Sounds: Normal        Anesthesia Plan  Type of Anesthesia, risks & benefits discussed:    Anesthesia Type: Gen Natural Airway  Intra-op Monitoring Plan: Standard ASA Monitors  Post Op Pain Control Plan: multimodal analgesia  Induction:  IV  Informed Consent: Informed consent signed with the Patient and all parties understand the risks and agree with anesthesia plan.  All questions answered.   ASA Score: 3    Ready For Surgery From Anesthesia Perspective.     .

## 2022-12-13 NOTE — CARE UPDATE
"Spoke with pharmacist about prescription transfer, stated will call pt's home pharmacy "when I get a chance" to get prescription transferred.   "

## 2022-12-13 NOTE — ANESTHESIA POSTPROCEDURE EVALUATION
Anesthesia Post Evaluation    Patient: Tasha Hawley    Procedure(s) Performed: Procedure(s) (LRB):  CYSTOSCOPY,WITH BOTULINUM TOXIN INJECTION (N/A)  INSERTION, SUPRAPUBIC CATHETER (N/A)    Final Anesthesia Type: general      Patient location during evaluation: PACU  Patient participation: Yes- Able to Participate  Level of consciousness: awake and alert  Post-procedure vital signs: reviewed and stable  Pain management: adequate  Airway patency: patent    PONV status at discharge: No PONV  Anesthetic complications: no      Cardiovascular status: blood pressure returned to baseline  Respiratory status: unassisted  Hydration status: euvolemic  Follow-up not needed.          Vitals Value Taken Time   BP 94/54 12/13/22 0916   Temp 36.5 °C (97.7 °F) 12/13/22 0823   Pulse 70 12/13/22 0929   Resp 20 12/13/22 0915   SpO2 87 % 12/13/22 0929   Vitals shown include unvalidated device data.      No case tracking events are documented in the log.      Pain/Lwo Score: Low Score: 10 (12/13/2022  8:45 AM)

## 2022-12-13 NOTE — INTERVAL H&P NOTE
The patient has been examined and the H&P has been reviewed:    I concur with the findings and no changes have occurred since H&P was written.    Procedure risks, benefits and alternative options discussed and understood by patient/family.    Denies recent fevers, illnesses and n/v/d.    Urine dipstick - specific gravity 1.000, pH 9. Negative for all remaining components.      There are no hospital problems to display for this patient.    Patient seen in holding.  No changes in clinical condition.  Proceed with planned procedure.

## 2022-12-13 NOTE — PATIENT INSTRUCTIONS
What to Expect After a Cystoscopy  For the next 24-48 hours, you may feel a mild burning when you urinate. This burning is normal and expected. Drink plenty of water to dilute the urine to help relieve the burning sensation. You may also see a small amount of blood in your urine after the procedure. Do not strain to have a bowel movement. No strenuous exercise x 7 days. No driving while you are on narcotic pain medications or if your meadows catheter is in place.    You can expect:  To pass stone fragments if you had a stone procedure  Have pain when you void from your stent if you have a stent in place  See blood in your urine if you have a stent in place    Unless you are already taking antibiotics, you may be given an antibiotic after the test to prevent infection.    Signs and Symptoms to Report  Call the [facility] at [phone] if you develop any of the following:  Fever of 101 degrees or higher  Chills or persistent bleeding  Inability to urinate      If you have a catheter, please return to the ER if your catheter stops draining or you are having abdominal pain.

## 2022-12-13 NOTE — OP NOTE
[facility]  Operative Note    Date: 12/13/2022    Pre-Op Diagnosis: Neurogenic bladder    Patient Active Problem List    Diagnosis Date Noted    Calcaneal spur of left foot 09/17/2022    Left foot pain 09/17/2022    Charcot ankle, unspecified laterality 09/17/2022    COVID 08/16/2022    Preoperative clearance 08/11/2022    Known medical problems 04/19/2022    Staph aureus infection 04/17/2022    Abdominal pain 02/06/2022    Anxiety 02/05/2022    Hyponatremia 02/04/2022    Cellulitis 07/07/2021    Tremor     History of stroke with residual deficit 01/14/2021    Weakness 01/13/2021    Impaired functional mobility and activity tolerance 06/22/2020    Urinary tract infection associated with cystostomy catheter 05/12/2020    Pain in both lower legs 02/06/2020    Constipation due to opioid therapy 01/30/2020    Partial small bowel obstruction 01/27/2020    Pure hypercholesterolemia 01/13/2020    Chronic diastolic heart failure 01/13/2020    Inflammatory spondylopathy of lumbar region 08/29/2019    Encounter for suprapubic catheter care 05/23/2019    Mixed stress and urge urinary incontinence 05/13/2019    Recurrent UTI 06/08/2018    Esophageal dysphagia 05/23/2018    Suprapubic catheter 02/01/2018    Insomnia due to medical condition 12/08/2017    Bradycardia 11/14/2017    Bilateral carotid artery disease 11/14/2017    Scoliosis deformity of spine 03/31/2017    S/P insertion of spinal cord stimulator 03/31/2017    S/P insertion of intrathecal pump 03/31/2017    Paresthesia of both lower extremities 03/31/2017    Degenerative disc disease, lumbar 03/31/2017    Osteoarthritis of spine with radiculopathy, lumbar region 03/31/2017    Facet arthropathy, lumbar 03/31/2017    Lymphedema of both lower extremities 03/02/2017    Primary hypothyroidism 02/02/2017    Frequent falls 02/01/2017    Urinary retention 12/21/2016    Neurogenic bladder 09/27/2016    Drug-induced constipation 08/16/2016    GERD  (gastroesophageal reflux disease) 08/16/2016    Coronary artery disease of native artery of native heart with stable angina pectoris 08/16/2016    Narcotic dependency, continuous 07/18/2014    CHF (congestive heart failure) 07/18/2014    Thoracic aorta atherosclerosis 07/18/2014    Cervical myelopathy 05/13/2013    Chronic pain syndrome 05/01/2013    Major depressive disorder, recurrent, mild 02/21/2013    Generalized anxiety disorder     Sleep apnea     Iron deficiency anemia          Post-Op Diagnosis: same    Procedure(s) Performed:   1.  Cystoscopy with bladder botox injection  2.  Suprapubic tube exchange    Specimen(s): none    Staff Surgeon:  Shireen Mayo MD    Assistant Surgeon: Betito Harris MD    Anesthesia: General mask inhalational anesthesia    Indications: Tasha Hawley is a 67 y.o. female with neurogenic bladder who presents for botox injections into the bladder and a SPT exchange.     Findings: 300u of botox injected in 16cc of NS    Estimated Blood Loss: min    Drains: 20Fr suprapubic tube with 30cc in the balloon    Procedure in Detail:  After informed consent was obtained the patient was brought to the cystoscopy suite and placed in the supine position.  SCDs were applied and working.  Anesthesia was administered.  When the patient was adequately sedated she was placed in the dorsal lithotomy position and prepped and draped in the usual sterile fashion.      A rigid cystoscope in a 22 Fr sheath was introduced into the patients's bladder via the urethra.  This passed easily.  Formal cystoscopy was performed which revealed the ureteral orifices in their normal anatomic position bilaterally.  No bladder masses, trabeculations, stones or diverticuli were seen.      300 units of botox was injected into the detrusor muscle throughout the bladder.  Good wheals were raised.      We then turned our attention the to the patient's existing SPT which was removed under direct vision. We then  immediately replaced the suprapubic tube with a 20Fr silicone meadows under direct vision to confirm its proper position. 30cc of NS was injected into the balloon and also seen under direct vision.    The patient's bladder was drained and the cystoscope was removed.     The patient tolerated the procedure well and was transferred to the recovery room in stable condition.      Disposition:  The patient will follow up with Dr. Mayo as needed (virtually). She knows how to self-cath should she have any issues with retention.      Betito Harris MD    Patient seen in holding.  No changes in clinical condition.  Proceed with planned procedure.

## 2022-12-13 NOTE — DISCHARGE SUMMARY
SUBJECTIVE  Sancho Linn is a 78 y.o. female. HPI/Chief C/O:  Chief Complaint   Patient presents with    Back Pain     Pt presents to the office for lower back pain. Pt states last week she went to the grocery store to get a turkey for thanksgiving and she thinks she hurt her back from lifting it so many times putting it in the chart and onto the check out carolyn and back into the cart then into her car and into the house. Pt states after lifting it that much she felt the strain in her lower back. No Known Allergies    This 78year old female presents for physical exam. Pt has atrial fib. ([address] hypertension, varicose veins, left knee pain, hyperlipidemia, and joint pain. Pt c/o of lumbar pain after lifting a turkey. Pt denies bladder and bowel incontenence and denies symptoms of cauda equia,. ROS:  Review of Systems   Constitutional:  Positive for fatigue. Negative for activity change, appetite change, chills, diaphoresis, fever and unexpected weight change. HENT:  Negative for congestion, dental problem, drooling, ear discharge, ear pain, facial swelling, hearing loss, mouth sores, nosebleeds, postnasal drip, rhinorrhea, sinus pressure, sinus pain, sneezing, sore throat, tinnitus, trouble swallowing and voice change. Eyes: Negative. Negative for photophobia, pain, discharge, redness, itching and visual disturbance. Respiratory: Negative. Negative for apnea, cough, choking, chest tightness, shortness of breath, wheezing and stridor. Cardiovascular: Negative. Negative for chest pain, palpitations and leg swelling. Gastrointestinal: Negative. Negative for abdominal distention, abdominal pain, anal bleeding, blood in stool, constipation, diarrhea, nausea, rectal pain and vomiting. Endocrine: Negative. Negative for cold intolerance, heat intolerance, polydipsia, polyphagia and polyuria. Genitourinary: Negative.   Negative for decreased urine volume, difficulty urinating, dysuria, [facility] - Surgery (1st Fl)  Discharge Note  Short Stay    Procedure(s) (LRB):  CYSTOSCOPY,WITH BOTULINUM TOXIN INJECTION (N/A)  INSERTION, SUPRAPUBIC CATHETER (N/A)      OUTCOME: Patient tolerated treatment/procedure well without complication and is now ready for discharge.    DISPOSITION: Home or Self Care    FINAL DIAGNOSIS:  Neurogenic bladder    FOLLOWUP: In clinic    DISCHARGE INSTRUCTIONS:    Discharge Procedure Orders   Lifting restrictions   Order Comments: Do not drive while taking pain medications. No strenuous activity or lifting greater than 10 pounds for 4 weeks.     No dressing needed   Order Comments: Do not soak incisions in tub or bath and do not scrub incisions. You may shower over incisions. If you have surgical glue in place, the glue will come off over the next few weeks.     Notify your health care provider if you experience any of the following:  temperature >100.4     Notify your health care provider if you experience any of the following:  persistent nausea and vomiting or diarrhea     Notify your health care provider if you experience any of the following:  severe uncontrolled pain     Notify your health care provider if you experience any of the following:  redness, tenderness, or signs of infection (pain, swelling, redness, odor or green/yellow discharge around incision site)     Notify your health care provider if you experience any of the following:  difficulty breathing or increased cough     Notify your health care provider if you experience any of the following:  persistent dizziness, light-headedness, or visual disturbances        TIME SPENT ON DISCHARGE: 15 minutes   enuresis, flank pain, frequency, genital sores, hematuria, menstrual problem, pelvic pain and urgency. Musculoskeletal:  Positive for arthralgias, back pain and myalgias. Negative for gait problem, joint swelling, neck pain and neck stiffness. Skin: Negative. Negative for color change, pallor, rash and wound. Allergic/Immunologic: Negative. Negative for environmental allergies, food allergies and immunocompromised state. Neurological: Negative. Negative for dizziness, tremors, seizures, syncope, facial asymmetry, speech difficulty, weakness, light-headedness, numbness and headaches. Hematological:  Negative for adenopathy. Bruises/bleeds easily. Psychiatric/Behavioral:  Negative for agitation, behavioral problems, confusion, decreased concentration, dysphoric mood, hallucinations, self-injury, sleep disturbance and suicidal ideas. The patient is nervous/anxious. The patient is not hyperactive.        Past Medical/Surgical Hx;  Reviewed with patient      Diagnosis Date    Atrial fibrillation ([facility] [address]     Coronary heart disease 2013    Endometrial hyperplasia     Mixed hyperlipidemia 2022    Pelvic mass     Primary osteoarthritis of right shoulder 2019    Sciatica of right side 2022     Past Surgical History:   Procedure Laterality Date     SECTION      DILATION AND CURETTAGE OF UTERUS      ECHO COMPL W DOP COLOR FLOW  2013         HYSTERECTOMY (CERVIX STATUS UNKNOWN)  10/12/2021    TONSILLECTOMY         Past Family Hx:  Reviewed with patient      Problem Relation Age of Onset    Breast Cancer Maternal Grandmother     Breast Cancer Maternal Aunt     Ovarian Cancer Neg Hx     Colon Cancer Neg Hx        Social Hx:  Reviewed with patient  Social History     Tobacco Use    Smoking status: Former     Packs/day: 2.00     Years: 15.00     Pack years: 30.00     Types: Cigarettes     Start date: 1972     Quit date: 1987     Years since quittin.9    Smokeless tobacco: Never   Substance Use Topics    Alcohol use: No       OBJECTIVE  /88   Pulse 88   Temp 97.3 °F (36.3 °C) (Temporal)   Resp 17   Ht 5' 8\" (1.727 m)   Wt 275 lb (124.7 kg)   LMP  (LMP Unknown)   SpO2 95%   Breastfeeding No   BMI 41.81 kg/m²     Problem List:  Vin Noel does not have any pertinent problems on file. PHYS EX:  Physical Exam  Vitals and nursing note reviewed. Constitutional:       General: She is not in acute distress. Appearance: Normal appearance. She is well-developed. She is obese. She is not ill-appearing, toxic-appearing or diaphoretic. Comments: Patient has morbid obesity. Patient instructed on low calorie, healthy diet. HENT:      Head: Normocephalic and atraumatic. Nose: Nose normal. No congestion or rhinorrhea. Eyes:      General: No scleral icterus. Right eye: No discharge. Left eye: No discharge. Conjunctiva/sclera: Conjunctivae normal.      Pupils: Pupils are equal, round, and reactive to light. Neck:      Thyroid: No thyromegaly. Vascular: No carotid bruit or JVD. Trachea: No tracheal deviation. Cardiovascular:      Rate and Rhythm: Normal rate and regular rhythm. No extrasystoles are present. Chest Wall: PMI is not displaced. Pulses: Normal pulses. Heart sounds: Normal heart sounds. Heart sounds not distant. No murmur heard. No systolic murmur is present. No diastolic murmur is present. No friction rub. No gallop. Comments: Pt has chronic atrial fib. ([address] Pt has varicose veins maggie. Lower extremities. Pulmonary:      Effort: Pulmonary effort is normal. No respiratory distress. Breath sounds: Normal breath sounds. No stridor. No wheezing, rhonchi or rales. Chest:      Chest wall: No tenderness. Abdominal:      General: Bowel sounds are normal. There is no distension. Palpations: Abdomen is soft. There is no mass. Tenderness: There is no abdominal tenderness.  There is no right CVA tenderness, left CVA tenderness, guarding or rebound. Hernia: No hernia is present. Musculoskeletal:         General: Tenderness present. No swelling, deformity or signs of injury. Right shoulder: No swelling, deformity, effusion, laceration, bony tenderness or crepitus. Decreased strength. Normal pulse. Arms:       Cervical back: Normal range of motion and neck supple. No rigidity or tenderness. No muscular tenderness. Right lower leg: No edema. Left lower leg: No edema. Comments: Pain and decreased ROM lumbar. Lymphadenopathy:      Cervical: No cervical adenopathy. Skin:     General: Skin is warm. Coloration: Skin is not jaundiced or pale. Findings: No bruising, erythema, lesion or rash. Neurological:      General: No focal deficit present. Mental Status: She is alert and oriented to person, place, and time. Cranial Nerves: No cranial nerve deficit. Sensory: No sensory deficit. Motor: No weakness or abnormal muscle tone. Coordination: Coordination normal.      Gait: Gait normal.      Deep Tendon Reflexes: Reflexes are normal and symmetric. Reflexes normal.   Psychiatric:         Mood and Affect: Mood normal.         Behavior: Behavior normal.         Thought Content: Thought content normal.         Judgment: Judgment normal.       ASSESSMENT/PLAN  Kandy Corcoran was seen today for back pain. Diagnoses and all orders for this visit:    Primary hypertension  -     CBC with Auto Differential; Future  Controlled. BB, low salt diet. Atrial fibrillation, unspecified type (HCC)  -     warfarin (COUMADIN) 2.5 MG tablet; Take one tablet daily or as directed  -     POCT INR  -     CBC with Auto Differential; Future  -     Comprehensive Metabolic Panel; Future    Chronic atrial fibrillation (HCC)  -     warfarin (COUMADIN) 2.5 MG tablet;  Take one tablet daily or as directed  -     POCT INR  -     CBC with Auto Differential; Future    Varicose veins of both lower extremities, unspecified whether complicated  -     CBC with Auto Differential; Future  -     Halle Ng MD, Vascular Surgery, [place]    Osteoarthritis, unspecified osteoarthritis type, unspecified site  -     CBC with Auto Differential; Future  -     dexamethasone (DECADRON) 4 MG/ML injection; Inject 1 mL into the muscle once for 1 dose    Chronic pain of left knee  -     CBC with Auto Differential; Future    Mixed hyperlipidemia  -     CBC with Auto Differential; Future  -     Lipid Panel; Future  Pt instructed on low chol. Diet. IFG (impaired fasting glucose)  -     CBC with Auto Differential; Future  -     Hemoglobin A1C; Future    Fatigue, unspecified type  -     CBC with Auto Differential; Future  -     TSH; Future  -     SHANT; Future  -     Miscellaneous Sendout; Future  -     Rheumatoid Factor; Future  -     Sedimentation Rate; Future  -     C-Reactive Protein; Future    Arthralgia, unspecified joint  -     CBC with Auto Differential; Future  -     SHANT; Future  -     Miscellaneous Sendout; Future  -     Rheumatoid Factor; Future  -     Sedimentation Rate; Future  -     C-Reactive Protein; Future    Sciatica of right side  -     dexamethasone (DECADRON) 4 MG/ML injection; Inject 1 mL into the muscle once for 1 dose    Pt instructed if any worse go ED ASAP.     Outpatient Encounter Medications as of 2022   Medication Sig Dispense Refill    warfarin (COUMADIN) 2.5 MG tablet Take one tablet daily or as directed 90 tablet 0    [] dexamethasone (DECADRON) 4 MG/ML injection Inject 1 mL into the muscle once for 1 dose 1 mL 0    sotalol (BETAPACE) 120 MG tablet TAKE 1 TABLET BY MOUTH TWICE DAILY 180 tablet 1    torsemide (DEMADEX) 10 MG tablet TAKE 1 TABLET BY MOUTH DAILY 90 tablet 1    vitamin D (CHOLECALCIFEROL) 50 MCG ( UT) TABS tablet Take 2,000 Units by mouth daily Takes 5 times a week      Potassium Gluconate 595 MG CAPS Take by mouth three times a week MAGNESIUM-ZINC PO Take by mouth four times a week      Ascorbic Acid (VITAMIN C PO) Take by mouth daily Chewable      baclofen (LIORESAL) 10 MG tablet Take 1 tablet by mouth every evening 90 tablet 0    [DISCONTINUED] warfarin (COUMADIN) 2.5 MG tablet Take one tablet daily or as directed 90 tablet 0    [DISCONTINUED] fluticasone (FLONASE) 50 MCG/ACT nasal spray 1 spray by Nasal route daily 16 g 3    [DISCONTINUED] loratadine (CLARITIN) 10 MG tablet Take 1 tablet by mouth daily 30 tablet 3    [DISCONTINUED] warfarin (COUMADIN) 2.5 MG tablet TAKE 1 TABLET BY MOUTH DAILY OR AS DIRECTED 90 tablet 1    [DISCONTINUED] ibuprofen (ADVIL;MOTRIN) 600 MG tablet Take 1 tablet by mouth 4 times daily as needed for Pain 40 tablet 0    [DISCONTINUED] COD LIVER OIL PO Take by mouth Takes 5 times a week       No facility-administered encounter medications on file as of 11/21/2022. Return in about 3 months (around 2/21/2023).         Reviewed recent labs related to Nelida Solano current problems      Discussed importance of regular Health Maintenance follow up  Health Maintenance   Topic    Hepatitis C screen     DTaP/Tdap/Td vaccine (1 - Tdap)    Colorectal Cancer Screen     Shingles vaccine (1 of 2)    DEXA (modify frequency per FRAX score)     Pneumococcal 65+ years Vaccine (1 - PCV)    Flu vaccine (1)    Breast cancer screen     A1C test (Diabetic or Prediabetic)     Depression Screen     Lipids     COVID-19 Vaccine     Hepatitis A vaccine     Hib vaccine     Meningococcal (ACWY) vaccine

## 2022-12-13 NOTE — PLAN OF CARE
Discharge instructions reviewed with pt and pt's  at bedside. Verbalized understanding. Packet given.     MD notified of unavailability of medications at [facility] and need to send prescriptions elsewhere.

## 2023-01-05 ENCOUNTER — DOCUMENTATION ONLY (OUTPATIENT)
Dept: UROLOGY | Facility: CLINIC | Age: 68
End: 2023-01-05
Payer: MEDICAID

## 2023-01-05 ENCOUNTER — OFFICE VISIT (OUTPATIENT)
Dept: UROLOGY | Facility: CLINIC | Age: 68
End: 2023-01-05
Payer: MEDICAID

## 2023-01-05 DIAGNOSIS — Z98.890 POST-OPERATIVE STATE: Primary | ICD-10-CM

## 2023-01-05 PROCEDURE — 99024 POSTOP FOLLOW-UP VISIT: CPT | Mod: 95,,, | Performed by: UROLOGY

## 2023-01-05 PROCEDURE — 99024 PR POST-OP FOLLOW-UP VISIT: ICD-10-PCS | Mod: 95,,, | Performed by: UROLOGY

## 2023-01-05 NOTE — PROGRESS NOTES
The patient location is: [place]  The chief complaint leading to consultation is: follow up after Botox injection of the bladder    Visit type: audio only    Time with patient:  15 minutes of total time spent on the encounter, which includes face to face time and non-face to face time preparing to see the patient (eg, review of tests), obtaining and/or reviewing separately obtained history, documenting clinical information in the electronic or other health record, independently interpreting results (not separately reported) and communicating results to the patient/family/caregiver, or care coordination (not separately reported).     Each patient to whom he or she provides medical services by telemedicine is:  (1) informed of the relationship between the physician and patient and the respective role of any other health care provider with respect to management of the patient; and (2) notified that he or she may decline to receive medical services by telemedicine and may withdraw from such care at any time.        CHIEF COMPLAINT:    Mrs. Hawley is a 67 y.o. female presenting for a follow up after Botox injection of the bladder on 12/13/2022    PRESENTING ILLNESS:    Tasha Hawley is a 67 y.o. female who is presents with a history of neurogenic bladder. She states that after her injection she did very well.  She was on nitrofurantoin for a bladder infection which covered her through the surgery.  However, a few days after she stopped the nitrofuantoin, she started having urge incontinence and she has soaked through 2 pull ups.  She denies any fevers, chills or flank tenderness.  She states that the urine looks clear in the bag but she is concerned that she might be infected.     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 (Patient not taking: Reported on 2022) 15 g 0    aspirin (ECOTRIN) 81 MG EC tablet Take 1 tablet (81 mg total) by mouth once daily. 30 tablet 0    atorvastatin (LIPITOR) 80 MG tablet TAKE ONE TABLET BY MOUTH EVERY DAY 90 tablet 3    butalbital-acetaminophen-caffeine -40 mg (FIORICET, ESGIC) -40 mg per tablet Take 1 tablet by mouth daily as needed. 15 tablet 0    darifenacin (ENABLEX) 7.5 MG Tb24 Take 1 tablet (7.5 mg total) by mouth once daily. 30 tablet 11    [] dicloxacillin (DYNAPEN) 500 MG capsule Take 1 capsule (500 mg total) by mouth 4 (four) times daily. for 3 days 12 capsule 0    FLUoxetine 20 MG capsule Take 3 capsules (60 mg total) by mouth once daily. 270 capsule 0    furosemide (LASIX) 40 MG tablet Take 1 tablet (40 mg total) by mouth once daily. 30 tablet 0    HYDROcodone-acetaminophen (NORCO)  mg per tablet Take by mouth every 24 hours as needed.      HYDROcodone-acetaminophen (NORCO)  mg per tablet Take 1 tablet by mouth every 6 (six) hours as needed for Pain. 20 tablet 0    intrathecal pain pump compound Hydromorphone (7.5 mg/mL) infusion at 3.6799 mg/day (0.1533 mg/hr) out of a total reservoir volume of 37.3 mL  Pump filled every 2 months      levothyroxine (SYNTHROID) 137 MCG Tab tablet Take 1 tablet (137 mcg total) by mouth before breakfast. 30 tablet 0    [] nitrofurantoin, macrocrystal-monohydrate, (MACROBID) 100 MG capsule Take 1 capsule (100 mg total) by mouth 2 (two) times daily. for 3 days 14 capsule 0    nitroGLYCERIN  (NITROSTAT) 0.4 MG SL tablet Place 1 tablet (0.4 mg total) under the tongue every 5 (five) minutes as needed for Chest pain. 25 tablet 3    pantoprazole (PROTONIX) 40 MG tablet TAKE ONE TABLET BY MOUTH EVERY DAY 90 tablet 3    potassium chloride SA (K-DUR,KLOR-CON) 20 MEQ tablet Take 1 tablet (20 mEq total) by mouth 2 (two) times daily. 180 tablet 3    promethazine (PHENERGAN) 25 MG tablet Take 25 mg by mouth every 6 (six) hours as needed for Nausea.      QUEtiapine (SEROQUEL) 100 MG Tab TAKE 2 TABLETS (200 MG) BY MOUTH NIGHTLY 180 tablet 0    QUEtiapine (SEROQUEL) 25 MG Tab Take 12.5-25 mg twice daily as needed for anxiety 90 tablet 0    senna (SENNA LAX) 8.6 mg tablet Take 2 tablets by mouth 2 (two) times daily.      tiZANidine (ZANAFLEX) 4 MG tablet Take 4 mg by mouth 2 (two) times daily.      torsemide (DEMADEX) 100 MG Tab TAKE ONE TABLET BY MOUTH EVERY DAY 90 tablet 3    traZODone (DESYREL) 300 MG tablet Take 1 tablet (300 mg total) by mouth every evening. 90 tablet 0     No facility-administered encounter medications on file as of 1/5/2023.         PHYSICAL EXAMINATION:    The patient generally sounds in good health, is appropriately interactive, and is in no apparent distress.    Mental: Cooperative with normal affect.    Chest:  normal inspiratory effort.        LABS:    Lab Results   Component Value Date    BUN 11 08/22/2022    CREATININE 0.8 08/22/2022       IMPRESSION:    Urge incontinence, status post Botox injection   Suprapubic tube in place    PLAN:    1.  Orders placed for [facility] to go out to change the suprapubic tube and send a urine culture from the new tube.  She is due to be changed  2.  Will treat based on culture results.

## 2023-01-05 NOTE — PROGRESS NOTES
Per Dr. Mayo, verbal order for sptube change and urine culture given to Sammi mike/ [facility]. Notified that urine needs to be collected from the new catheter for ucx.

## 2023-01-06 ENCOUNTER — IMMUNIZATION (OUTPATIENT)
Dept: INTERNAL MEDICINE | Facility: CLINIC | Age: 68
End: 2023-01-06
Payer: MEDICARE

## 2023-01-06 ENCOUNTER — OFFICE VISIT (OUTPATIENT)
Dept: INTERNAL MEDICINE | Facility: CLINIC | Age: 68
End: 2023-01-06
Payer: MEDICAID

## 2023-01-06 VITALS
WEIGHT: 197.31 LBS | BODY MASS INDEX: 30.97 KG/M2 | DIASTOLIC BLOOD PRESSURE: 66 MMHG | HEART RATE: 52 BPM | SYSTOLIC BLOOD PRESSURE: 100 MMHG | HEIGHT: 67 IN | OXYGEN SATURATION: 96 %

## 2023-01-06 DIAGNOSIS — G47.01 INSOMNIA DUE TO MEDICAL CONDITION: Chronic | ICD-10-CM

## 2023-01-06 DIAGNOSIS — K21.9 GASTROESOPHAGEAL REFLUX DISEASE, UNSPECIFIED WHETHER ESOPHAGITIS PRESENT: ICD-10-CM

## 2023-01-06 DIAGNOSIS — E03.9 PRIMARY HYPOTHYROIDISM: Chronic | ICD-10-CM

## 2023-01-06 DIAGNOSIS — Z93.59 SUPRAPUBIC CATHETER: ICD-10-CM

## 2023-01-06 DIAGNOSIS — Z12.11 SCREENING FOR MALIGNANT NEOPLASM OF COLON: ICD-10-CM

## 2023-01-06 DIAGNOSIS — F33.0 MAJOR DEPRESSIVE DISORDER, RECURRENT, MILD: ICD-10-CM

## 2023-01-06 DIAGNOSIS — I50.9 CONGESTIVE HEART FAILURE, UNSPECIFIED HF CHRONICITY, UNSPECIFIED HEART FAILURE TYPE: ICD-10-CM

## 2023-01-06 DIAGNOSIS — Z23 NEED FOR VACCINATION: Primary | ICD-10-CM

## 2023-01-06 DIAGNOSIS — F41.9 ANXIETY: Primary | ICD-10-CM

## 2023-01-06 PROBLEM — Z78.9 KNOWN MEDICAL PROBLEMS: Status: RESOLVED | Noted: 2022-04-19 | Resolved: 2023-01-06

## 2023-01-06 PROBLEM — Z74.09 IMPAIRED FUNCTIONAL MOBILITY AND ACTIVITY TOLERANCE: Status: RESOLVED | Noted: 2020-06-22 | Resolved: 2023-01-06

## 2023-01-06 PROBLEM — Z01.818 PREOPERATIVE CLEARANCE: Status: RESOLVED | Noted: 2022-08-11 | Resolved: 2023-01-06

## 2023-01-06 PROBLEM — E87.1 HYPONATREMIA: Status: RESOLVED | Noted: 2022-02-04 | Resolved: 2023-01-06

## 2023-01-06 PROBLEM — L03.90 CELLULITIS: Status: RESOLVED | Noted: 2021-07-07 | Resolved: 2023-01-06

## 2023-01-06 PROBLEM — N39.0 URINARY TRACT INFECTION ASSOCIATED WITH CYSTOSTOMY CATHETER: Status: RESOLVED | Noted: 2020-05-12 | Resolved: 2023-01-06

## 2023-01-06 PROBLEM — A49.01 STAPH AUREUS INFECTION: Status: RESOLVED | Noted: 2022-04-17 | Resolved: 2023-01-06

## 2023-01-06 PROBLEM — R00.1 BRADYCARDIA: Status: RESOLVED | Noted: 2017-11-14 | Resolved: 2023-01-06

## 2023-01-06 PROBLEM — R10.9 ABDOMINAL PAIN: Status: RESOLVED | Noted: 2022-02-06 | Resolved: 2023-01-06

## 2023-01-06 PROBLEM — U07.1 COVID: Status: RESOLVED | Noted: 2022-08-16 | Resolved: 2023-01-06

## 2023-01-06 PROBLEM — M47.816 FACET ARTHROPATHY, LUMBAR: Status: RESOLVED | Noted: 2017-03-31 | Resolved: 2023-01-06

## 2023-01-06 PROBLEM — Z43.5 ENCOUNTER FOR SUPRAPUBIC CATHETER CARE: Status: RESOLVED | Noted: 2019-05-23 | Resolved: 2023-01-06

## 2023-01-06 PROBLEM — M79.672 LEFT FOOT PAIN: Status: RESOLVED | Noted: 2022-09-17 | Resolved: 2023-01-06

## 2023-01-06 PROBLEM — R53.1 WEAKNESS: Status: RESOLVED | Noted: 2021-01-13 | Resolved: 2023-01-06

## 2023-01-06 PROBLEM — T83.510A URINARY TRACT INFECTION ASSOCIATED WITH CYSTOSTOMY CATHETER: Status: RESOLVED | Noted: 2020-05-12 | Resolved: 2023-01-06

## 2023-01-06 PROCEDURE — 3288F PR FALLS RISK ASSESSMENT DOCUMENTED: ICD-10-PCS | Mod: CPTII,S$GLB,, | Performed by: INTERNAL MEDICINE

## 2023-01-06 PROCEDURE — 3008F BODY MASS INDEX DOCD: CPT | Mod: CPTII,S$GLB,, | Performed by: INTERNAL MEDICINE

## 2023-01-06 PROCEDURE — 91312 COVID-19, MRNA, LNP-S, BIVALENT BOOSTER, PF, 30 MCG/0.3 ML DOSE: CPT | Mod: S$GLB,,, | Performed by: INTERNAL MEDICINE

## 2023-01-06 PROCEDURE — G0008 ADMIN INFLUENZA VIRUS VAC: HCPCS | Mod: S$GLB,,, | Performed by: INTERNAL MEDICINE

## 2023-01-06 PROCEDURE — 3074F SYST BP LT 130 MM HG: CPT | Mod: CPTII,S$GLB,, | Performed by: INTERNAL MEDICINE

## 2023-01-06 PROCEDURE — 90694 VACC AIIV4 NO PRSRV 0.5ML IM: CPT | Mod: S$GLB,,, | Performed by: INTERNAL MEDICINE

## 2023-01-06 PROCEDURE — 99999 PR PBB SHADOW E&M-EST. PATIENT-LVL IV: ICD-10-PCS | Mod: PBBFAC,,, | Performed by: INTERNAL MEDICINE

## 2023-01-06 PROCEDURE — 3288F FALL RISK ASSESSMENT DOCD: CPT | Mod: CPTII,S$GLB,, | Performed by: INTERNAL MEDICINE

## 2023-01-06 PROCEDURE — 99999 PR PBB SHADOW E&M-EST. PATIENT-LVL IV: CPT | Mod: PBBFAC,,, | Performed by: INTERNAL MEDICINE

## 2023-01-06 PROCEDURE — 1159F PR MEDICATION LIST DOCUMENTED IN MEDICAL RECORD: ICD-10-PCS | Mod: CPTII,S$GLB,, | Performed by: INTERNAL MEDICINE

## 2023-01-06 PROCEDURE — 1160F RVW MEDS BY RX/DR IN RCRD: CPT | Mod: CPTII,S$GLB,, | Performed by: INTERNAL MEDICINE

## 2023-01-06 PROCEDURE — 3078F PR MOST RECENT DIASTOLIC BLOOD PRESSURE < 80 MM HG: ICD-10-PCS | Mod: CPTII,S$GLB,, | Performed by: INTERNAL MEDICINE

## 2023-01-06 PROCEDURE — 99215 OFFICE O/P EST HI 40 MIN: CPT | Mod: S$GLB,,, | Performed by: INTERNAL MEDICINE

## 2023-01-06 PROCEDURE — 1125F PR PAIN SEVERITY QUANTIFIED, PAIN PRESENT: ICD-10-PCS | Mod: CPTII,S$GLB,, | Performed by: INTERNAL MEDICINE

## 2023-01-06 PROCEDURE — 99215 PR OFFICE/OUTPT VISIT, EST, LEVL V, 40-54 MIN: ICD-10-PCS | Mod: S$GLB,,, | Performed by: INTERNAL MEDICINE

## 2023-01-06 PROCEDURE — 1100F PR PT FALLS ASSESS DOC 2+ FALLS/FALL W/INJURY/YR: ICD-10-PCS | Mod: CPTII,S$GLB,, | Performed by: INTERNAL MEDICINE

## 2023-01-06 PROCEDURE — 91312 COVID-19, MRNA, LNP-S, BIVALENT BOOSTER, PF, 30 MCG/0.3 ML DOSE: ICD-10-PCS | Mod: S$GLB,,, | Performed by: INTERNAL MEDICINE

## 2023-01-06 PROCEDURE — 1125F AMNT PAIN NOTED PAIN PRSNT: CPT | Mod: CPTII,S$GLB,, | Performed by: INTERNAL MEDICINE

## 2023-01-06 PROCEDURE — 3008F PR BODY MASS INDEX (BMI) DOCUMENTED: ICD-10-PCS | Mod: CPTII,S$GLB,, | Performed by: INTERNAL MEDICINE

## 2023-01-06 PROCEDURE — G0008 FLU VACCINE - QUADRIVALENT - ADJUVANTED: ICD-10-PCS | Mod: S$GLB,,, | Performed by: INTERNAL MEDICINE

## 2023-01-06 PROCEDURE — 1160F PR REVIEW ALL MEDS BY PRESCRIBER/CLIN PHARMACIST DOCUMENTED: ICD-10-PCS | Mod: CPTII,S$GLB,, | Performed by: INTERNAL MEDICINE

## 2023-01-06 PROCEDURE — 0124A COVID-19, MRNA, LNP-S, BIVALENT BOOSTER, PF, 30 MCG/0.3 ML DOSE: CPT | Mod: CV19,PBBFAC | Performed by: INTERNAL MEDICINE

## 2023-01-06 PROCEDURE — 3078F DIAST BP <80 MM HG: CPT | Mod: CPTII,S$GLB,, | Performed by: INTERNAL MEDICINE

## 2023-01-06 PROCEDURE — 90694 FLU VACCINE - QUADRIVALENT - ADJUVANTED: ICD-10-PCS | Mod: S$GLB,,, | Performed by: INTERNAL MEDICINE

## 2023-01-06 PROCEDURE — 1100F PTFALLS ASSESS-DOCD GE2>/YR: CPT | Mod: CPTII,S$GLB,, | Performed by: INTERNAL MEDICINE

## 2023-01-06 PROCEDURE — 99499 UNLISTED E&M SERVICE: CPT | Mod: S$PBB,,, | Performed by: INTERNAL MEDICINE

## 2023-01-06 PROCEDURE — 1159F MED LIST DOCD IN RCRD: CPT | Mod: CPTII,S$GLB,, | Performed by: INTERNAL MEDICINE

## 2023-01-06 PROCEDURE — 99499 RISK ADDL DX/OHS AUDIT: ICD-10-PCS | Mod: S$PBB,,, | Performed by: INTERNAL MEDICINE

## 2023-01-06 PROCEDURE — 3074F PR MOST RECENT SYSTOLIC BLOOD PRESSURE < 130 MM HG: ICD-10-PCS | Mod: CPTII,S$GLB,, | Performed by: INTERNAL MEDICINE

## 2023-01-06 RX ORDER — FLUOXETINE HYDROCHLORIDE 20 MG/1
60 CAPSULE ORAL DAILY
Qty: 270 CAPSULE | Refills: 3 | Status: SHIPPED | OUTPATIENT
Start: 2023-01-06 | End: 2023-05-22 | Stop reason: SDUPTHER

## 2023-01-06 RX ORDER — QUETIAPINE FUMARATE 100 MG/1
TABLET, FILM COATED ORAL
Qty: 180 TABLET | Refills: 3 | Status: SHIPPED | OUTPATIENT
Start: 2023-01-06 | End: 2023-05-22 | Stop reason: SDUPTHER

## 2023-01-06 RX ORDER — FUROSEMIDE 40 MG/1
40 TABLET ORAL DAILY
Qty: 90 TABLET | Refills: 3 | Status: ON HOLD | OUTPATIENT
Start: 2023-01-06 | End: 2023-03-31 | Stop reason: SDUPTHER

## 2023-01-06 RX ORDER — LEVOTHYROXINE SODIUM 137 UG/1
137 TABLET ORAL
Qty: 90 TABLET | Refills: 3 | Status: ON HOLD | OUTPATIENT
Start: 2023-01-06 | End: 2023-01-19 | Stop reason: SDUPTHER

## 2023-01-06 RX ORDER — TRAZODONE HYDROCHLORIDE 300 MG/1
300 TABLET ORAL NIGHTLY
Qty: 90 TABLET | Refills: 0 | Status: SHIPPED | OUTPATIENT
Start: 2023-01-06 | End: 2023-05-22 | Stop reason: SDUPTHER

## 2023-01-06 RX ORDER — PANTOPRAZOLE SODIUM 40 MG/1
40 TABLET, DELAYED RELEASE ORAL DAILY
Qty: 90 TABLET | Refills: 3 | Status: ON HOLD | OUTPATIENT
Start: 2023-01-06 | End: 2023-05-30 | Stop reason: SDUPTHER

## 2023-01-06 RX ORDER — POTASSIUM CHLORIDE 20 MEQ/1
20 TABLET, EXTENDED RELEASE ORAL 2 TIMES DAILY
Qty: 180 TABLET | Refills: 3 | Status: ON HOLD | OUTPATIENT
Start: 2023-01-06 | End: 2023-03-31 | Stop reason: SDUPTHER

## 2023-01-06 NOTE — PROGRESS NOTES
Subjective:       Patient ID: Tasha Hawley is a 67 y.o. female.    Chief Complaint:   Follow-up and Back Pain    HPI - She hasn't been to see me in several months.  Cystoscopy with botox injection on 12/13/2022 through Dr. Mayo; recovering well but still has some leakage around suprapubic catheter.  She needs refills on numerous meds, colon cancer screening and the flu shot.  She is not a smoker.  Leg pain continues; swelling is less today.  Constipation is always a problem; she has a BM about 1-2 times per week.  Insomnia not as big an issue this visit as in previous ones.    PMH:  Neurogenic bladder, with recurrent UTI's. Followed by urogyn (Milena).  Suprapubic catheter  CAD, with ACS in Jan 2016 - subtot mid LAD lesion without PCI; ischemic CM with EF 40% and chronic LE edema. Followed by [facility]  Chronic insomnia  Lymphedema bilateral LE's  Intermittent nausea  Chronic low back pain with narcotic use.  Indwelling narcotic pump  History CVA with dysarthria  Hypothyroidism, stable  CECI, not on CPAP  Anxiety and Depression  Chronic constipation, d/t ongoing narcotic use.     Meds: Reviewed and reconciled in EPIC with patient during visit today.    Review of Systems   Constitutional:  Negative for fever.   HENT:  Negative for congestion.    Respiratory:  Negative for shortness of breath.    Cardiovascular:  Negative for chest pain.   Gastrointestinal:  Positive for constipation.   Genitourinary:  Positive for difficulty urinating.   Musculoskeletal:  Positive for arthralgias, back pain and gait problem.   Skin:  Negative for rash.   Neurological:  Negative for headaches.   Psychiatric/Behavioral:  Positive for sleep disturbance.      Objective:      Physical Exam  Vitals reviewed.   Constitutional:       Appearance: She is well-developed.      Comments: Frail woman who appears older than stated age.   HENT:      Head: Normocephalic and atraumatic.   Cardiovascular:      Rate and Rhythm: Normal  rate and regular rhythm.      Heart sounds: Normal heart sounds. No murmur heard.    No friction rub. No gallop.   Pulmonary:      Effort: Pulmonary effort is normal. No respiratory distress.      Breath sounds: Normal breath sounds. No wheezing or rales.   Chest:      Chest wall: No tenderness.   Skin:     General: Skin is warm and dry.      Findings: No erythema.   Neurological:      Mental Status: She is alert. Mental status is at baseline.      Gait: Gait abnormal.   Psychiatric:         Mood and Affect: Mood normal.       Assessment:       1. Anxiety    2. Insomnia due to medical condition    3. Primary hypothyroidism    4. Gastroesophageal reflux disease, unspecified whether esophagitis present    5. Congestive heart failure, unspecified HF chronicity, unspecified heart failure type    6. Screening for malignant neoplasm of colon    7. Major depressive disorder, recurrent, mild    8. Suprapubic catheter          Plan:       Tasha was seen today for follow-up and back pain.    Diagnoses and all orders for this visit:    Anxiety - stable.  Refilling meds  -     FLUoxetine 20 MG capsule; Take 3 capsules (60 mg total) by mouth once daily.    Insomnia due to medical condition - stable on heavy medication regimen.  refills  -     traZODone (DESYREL) 300 MG tablet; Take 1 tablet (300 mg total) by mouth every evening.  -     QUEtiapine (SEROQUEL) 100 MG Tab; TAKE 2 TABLETS (200 MG) BY MOUTH NIGHTLY    Primary hypothyroidism - stable, with tsh at goal.  refills  -     levothyroxine (SYNTHROID) 137 MCG Tab tablet; Take 1 tablet (137 mcg total) by mouth before breakfast.    Gastroesophageal reflux disease, unspecified whether esophagitis present  - stable.  Needs refills  -     pantoprazole (PROTONIX) 40 MG tablet; Take 1 tablet (40 mg total) by mouth once daily.    Congestive heart failure, unspecified HF chronicity, unspecified heart failure type  -     potassium chloride SA (K-DUR,KLOR-CON) 20 MEQ tablet; Take 1  tablet (20 mEq total) by mouth 2 (two) times daily.    Screening for malignant neoplasm of colon  -     Fecal Immunochemical Test (iFOBT); Future    Major depressive disorder, recurrent, mild    Suprapubic catheter    Other orders  -     furosemide (LASIX) 40 MG tablet; Take 1 tablet (40 mg total) by mouth once daily.    Rtc prn, or in 3 months    PAPO Hodges MD MPH  Staff Internist

## 2023-01-08 ENCOUNTER — HOSPITAL ENCOUNTER (INPATIENT)
Facility: HOSPITAL | Age: 68
LOS: 9 days | Discharge: NURSING, REHAB OR LONG-TERM CARE | DRG: 698 | End: 2023-01-19
Attending: STUDENT IN AN ORGANIZED HEALTH CARE EDUCATION/TRAINING PROGRAM | Admitting: STUDENT IN AN ORGANIZED HEALTH CARE EDUCATION/TRAINING PROGRAM
Payer: MEDICARE

## 2023-01-08 DIAGNOSIS — N39.0 UTI (URINARY TRACT INFECTION): ICD-10-CM

## 2023-01-08 DIAGNOSIS — R00.1 BRADYCARDIA: ICD-10-CM

## 2023-01-08 DIAGNOSIS — G47.30 SLEEP APNEA, UNSPECIFIED TYPE: ICD-10-CM

## 2023-01-08 DIAGNOSIS — R00.1 SINUS BRADYCARDIA: ICD-10-CM

## 2023-01-08 DIAGNOSIS — E03.9 PRIMARY HYPOTHYROIDISM: Chronic | ICD-10-CM

## 2023-01-08 DIAGNOSIS — R07.9 CHEST PAIN: ICD-10-CM

## 2023-01-08 DIAGNOSIS — G89.4 CHRONIC PAIN SYNDROME: Primary | Chronic | ICD-10-CM

## 2023-01-08 DIAGNOSIS — F11.20 NARCOTIC DEPENDENCY, CONTINUOUS: Chronic | ICD-10-CM

## 2023-01-08 DIAGNOSIS — E27.40 ADRENAL INSUFFICIENCY: ICD-10-CM

## 2023-01-08 LAB
BACTERIA #/AREA URNS HPF: ABNORMAL /HPF
BILIRUB UR QL STRIP: NEGATIVE
CLARITY UR: ABNORMAL
COLOR UR: ABNORMAL
GLUCOSE UR QL STRIP: NEGATIVE
HGB UR QL STRIP: ABNORMAL
HYALINE CASTS #/AREA URNS LPF: 0 /LPF
KETONES UR QL STRIP: NEGATIVE
LEUKOCYTE ESTERASE UR QL STRIP: ABNORMAL
MICROSCOPIC COMMENT: ABNORMAL
NITRITE UR QL STRIP: NEGATIVE
PH UR STRIP: 6 [PH] (ref 5–8)
PROT UR QL STRIP: ABNORMAL
RBC #/AREA URNS HPF: >100 /HPF (ref 0–4)
SP GR UR STRIP: 1.01 (ref 1–1.03)
URN SPEC COLLECT METH UR: ABNORMAL
UROBILINOGEN UR STRIP-ACNC: NEGATIVE EU/DL
WBC #/AREA URNS HPF: 20 /HPF (ref 0–5)

## 2023-01-08 PROCEDURE — 87086 URINE CULTURE/COLONY COUNT: CPT | Mod: HCNC | Performed by: STUDENT IN AN ORGANIZED HEALTH CARE EDUCATION/TRAINING PROGRAM

## 2023-01-08 PROCEDURE — 83605 ASSAY OF LACTIC ACID: CPT | Mod: HCNC | Performed by: STUDENT IN AN ORGANIZED HEALTH CARE EDUCATION/TRAINING PROGRAM

## 2023-01-08 PROCEDURE — 85025 COMPLETE CBC W/AUTO DIFF WBC: CPT | Mod: HCNC | Performed by: STUDENT IN AN ORGANIZED HEALTH CARE EDUCATION/TRAINING PROGRAM

## 2023-01-08 PROCEDURE — 87088 URINE BACTERIA CULTURE: CPT | Mod: HCNC | Performed by: STUDENT IN AN ORGANIZED HEALTH CARE EDUCATION/TRAINING PROGRAM

## 2023-01-08 PROCEDURE — 80053 COMPREHEN METABOLIC PANEL: CPT | Mod: HCNC | Performed by: STUDENT IN AN ORGANIZED HEALTH CARE EDUCATION/TRAINING PROGRAM

## 2023-01-08 PROCEDURE — 87186 SC STD MICRODIL/AGAR DIL: CPT | Mod: HCNC | Performed by: STUDENT IN AN ORGANIZED HEALTH CARE EDUCATION/TRAINING PROGRAM

## 2023-01-08 PROCEDURE — 99285 EMERGENCY DEPT VISIT HI MDM: CPT | Mod: 25,HCNC

## 2023-01-08 PROCEDURE — 96375 TX/PRO/DX INJ NEW DRUG ADDON: CPT | Mod: HCNC

## 2023-01-08 PROCEDURE — 87077 CULTURE AEROBIC IDENTIFY: CPT | Mod: HCNC | Performed by: STUDENT IN AN ORGANIZED HEALTH CARE EDUCATION/TRAINING PROGRAM

## 2023-01-08 PROCEDURE — 81000 URINALYSIS NONAUTO W/SCOPE: CPT | Mod: HCNC | Performed by: STUDENT IN AN ORGANIZED HEALTH CARE EDUCATION/TRAINING PROGRAM

## 2023-01-08 PROCEDURE — 87040 BLOOD CULTURE FOR BACTERIA: CPT | Mod: HCNC | Performed by: STUDENT IN AN ORGANIZED HEALTH CARE EDUCATION/TRAINING PROGRAM

## 2023-01-08 PROCEDURE — 96365 THER/PROPH/DIAG IV INF INIT: CPT | Mod: HCNC

## 2023-01-08 RX ORDER — CLINDAMYCIN PHOSPHATE 900 MG/50ML
900 INJECTION, SOLUTION INTRAVENOUS
Status: COMPLETED | OUTPATIENT
Start: 2023-01-08 | End: 2023-01-09

## 2023-01-08 RX ORDER — KETOROLAC TROMETHAMINE 30 MG/ML
15 INJECTION, SOLUTION INTRAMUSCULAR; INTRAVENOUS
Status: COMPLETED | OUTPATIENT
Start: 2023-01-08 | End: 2023-01-09

## 2023-01-09 ENCOUNTER — TELEPHONE (OUTPATIENT)
Dept: UROLOGY | Facility: CLINIC | Age: 68
End: 2023-01-09
Payer: MEDICAID

## 2023-01-09 PROBLEM — E87.6 HYPOKALEMIA: Status: ACTIVE | Noted: 2023-01-09

## 2023-01-09 PROBLEM — N39.0 UTI (URINARY TRACT INFECTION): Chronic | Status: ACTIVE | Noted: 2021-06-03

## 2023-01-09 LAB
ALBUMIN SERPL BCP-MCNC: 3.4 G/DL (ref 3.5–5.2)
ALP SERPL-CCNC: 105 U/L (ref 55–135)
ALT SERPL W/O P-5'-P-CCNC: 5 U/L (ref 10–44)
ANION GAP SERPL CALC-SCNC: 11 MMOL/L (ref 8–16)
ANION GAP SERPL CALC-SCNC: 8 MMOL/L (ref 8–16)
AST SERPL-CCNC: 10 U/L (ref 10–40)
BASOPHILS # BLD AUTO: 0.01 K/UL (ref 0–0.2)
BASOPHILS # BLD AUTO: 0.01 K/UL (ref 0–0.2)
BASOPHILS # BLD AUTO: 0.02 K/UL (ref 0–0.2)
BASOPHILS NFR BLD: 0.2 % (ref 0–1.9)
BASOPHILS NFR BLD: 0.2 % (ref 0–1.9)
BASOPHILS NFR BLD: 0.3 % (ref 0–1.9)
BILIRUB SERPL-MCNC: 0.6 MG/DL (ref 0.1–1)
BUN SERPL-MCNC: 12 MG/DL (ref 8–23)
BUN SERPL-MCNC: 13 MG/DL (ref 8–23)
CALCIUM SERPL-MCNC: 8.7 MG/DL (ref 8.7–10.5)
CALCIUM SERPL-MCNC: 9.3 MG/DL (ref 8.7–10.5)
CHLORIDE SERPL-SCNC: 95 MMOL/L (ref 95–110)
CHLORIDE SERPL-SCNC: 98 MMOL/L (ref 95–110)
CO2 SERPL-SCNC: 29 MMOL/L (ref 23–29)
CO2 SERPL-SCNC: 31 MMOL/L (ref 23–29)
CREAT SERPL-MCNC: 0.9 MG/DL (ref 0.5–1.4)
CREAT SERPL-MCNC: 1.1 MG/DL (ref 0.5–1.4)
DIFFERENTIAL METHOD: ABNORMAL
EOSINOPHIL # BLD AUTO: 0.3 K/UL (ref 0–0.5)
EOSINOPHIL # BLD AUTO: 0.3 K/UL (ref 0–0.5)
EOSINOPHIL # BLD AUTO: 0.4 K/UL (ref 0–0.5)
EOSINOPHIL NFR BLD: 4 % (ref 0–8)
EOSINOPHIL NFR BLD: 5.3 % (ref 0–8)
EOSINOPHIL NFR BLD: 6.4 % (ref 0–8)
ERYTHROCYTE [DISTWIDTH] IN BLOOD BY AUTOMATED COUNT: 15.4 % (ref 11.5–14.5)
ERYTHROCYTE [DISTWIDTH] IN BLOOD BY AUTOMATED COUNT: 15.5 % (ref 11.5–14.5)
ERYTHROCYTE [DISTWIDTH] IN BLOOD BY AUTOMATED COUNT: 15.5 % (ref 11.5–14.5)
EST. GFR  (NO RACE VARIABLE): 55 ML/MIN/1.73 M^2
EST. GFR  (NO RACE VARIABLE): >60 ML/MIN/1.73 M^2
GLUCOSE SERPL-MCNC: 90 MG/DL (ref 70–110)
GLUCOSE SERPL-MCNC: 96 MG/DL (ref 70–110)
HCT VFR BLD AUTO: 27.5 % (ref 37–48.5)
HCT VFR BLD AUTO: 29.7 % (ref 37–48.5)
HCT VFR BLD AUTO: 30.2 % (ref 37–48.5)
HGB BLD-MCNC: 8.4 G/DL (ref 12–16)
HGB BLD-MCNC: 9 G/DL (ref 12–16)
HGB BLD-MCNC: 9.2 G/DL (ref 12–16)
IMM GRANULOCYTES # BLD AUTO: 0.01 K/UL (ref 0–0.04)
IMM GRANULOCYTES # BLD AUTO: 0.01 K/UL (ref 0–0.04)
IMM GRANULOCYTES # BLD AUTO: 0.02 K/UL (ref 0–0.04)
IMM GRANULOCYTES NFR BLD AUTO: 0.2 % (ref 0–0.5)
IMM GRANULOCYTES NFR BLD AUTO: 0.2 % (ref 0–0.5)
IMM GRANULOCYTES NFR BLD AUTO: 0.3 % (ref 0–0.5)
LACTATE SERPL-SCNC: 0.8 MMOL/L (ref 0.5–2.2)
LACTATE SERPL-SCNC: 0.8 MMOL/L (ref 0.5–2.2)
LYMPHOCYTES # BLD AUTO: 1 K/UL (ref 1–4.8)
LYMPHOCYTES # BLD AUTO: 1.3 K/UL (ref 1–4.8)
LYMPHOCYTES # BLD AUTO: 1.3 K/UL (ref 1–4.8)
LYMPHOCYTES NFR BLD: 16.4 % (ref 18–48)
LYMPHOCYTES NFR BLD: 18.5 % (ref 18–48)
LYMPHOCYTES NFR BLD: 21.7 % (ref 18–48)
MCH RBC QN AUTO: 24.7 PG (ref 27–31)
MCH RBC QN AUTO: 24.9 PG (ref 27–31)
MCH RBC QN AUTO: 24.9 PG (ref 27–31)
MCHC RBC AUTO-ENTMCNC: 30.3 G/DL (ref 32–36)
MCHC RBC AUTO-ENTMCNC: 30.5 G/DL (ref 32–36)
MCHC RBC AUTO-ENTMCNC: 30.5 G/DL (ref 32–36)
MCV RBC AUTO: 81 FL (ref 82–98)
MCV RBC AUTO: 81 FL (ref 82–98)
MCV RBC AUTO: 82 FL (ref 82–98)
MONOCYTES # BLD AUTO: 0.7 K/UL (ref 0.3–1)
MONOCYTES NFR BLD: 11 % (ref 4–15)
MONOCYTES NFR BLD: 11.5 % (ref 4–15)
MONOCYTES NFR BLD: 9.2 % (ref 4–15)
NEUTROPHILS # BLD AUTO: 3.6 K/UL (ref 1.8–7.7)
NEUTROPHILS # BLD AUTO: 4.1 K/UL (ref 1.8–7.7)
NEUTROPHILS # BLD AUTO: 4.8 K/UL (ref 1.8–7.7)
NEUTROPHILS NFR BLD: 60 % (ref 38–73)
NEUTROPHILS NFR BLD: 66.9 % (ref 38–73)
NEUTROPHILS NFR BLD: 67.7 % (ref 38–73)
NRBC BLD-RTO: 0 /100 WBC
PLATELET # BLD AUTO: 167 K/UL (ref 150–450)
PLATELET # BLD AUTO: 183 K/UL (ref 150–450)
PLATELET # BLD AUTO: 211 K/UL (ref 150–450)
PMV BLD AUTO: 10.1 FL (ref 9.2–12.9)
PMV BLD AUTO: 10.1 FL (ref 9.2–12.9)
PMV BLD AUTO: 10.6 FL (ref 9.2–12.9)
POCT GLUCOSE: 102 MG/DL (ref 70–110)
POTASSIUM SERPL-SCNC: 3.2 MMOL/L (ref 3.5–5.1)
POTASSIUM SERPL-SCNC: 3.2 MMOL/L (ref 3.5–5.1)
PROT SERPL-MCNC: 7.6 G/DL (ref 6–8.4)
RBC # BLD AUTO: 3.38 M/UL (ref 4–5.4)
RBC # BLD AUTO: 3.62 M/UL (ref 4–5.4)
RBC # BLD AUTO: 3.73 M/UL (ref 4–5.4)
SODIUM SERPL-SCNC: 135 MMOL/L (ref 136–145)
SODIUM SERPL-SCNC: 137 MMOL/L (ref 136–145)
WBC # BLD AUTO: 5.91 K/UL (ref 3.9–12.7)
WBC # BLD AUTO: 6.17 K/UL (ref 3.9–12.7)
WBC # BLD AUTO: 7.03 K/UL (ref 3.9–12.7)

## 2023-01-09 PROCEDURE — 25000003 PHARM REV CODE 250: Mod: HCNC | Performed by: NURSE PRACTITIONER

## 2023-01-09 PROCEDURE — 97530 THERAPEUTIC ACTIVITIES: CPT | Mod: HCNC

## 2023-01-09 PROCEDURE — 80048 BASIC METABOLIC PNL TOTAL CA: CPT | Mod: HCNC | Performed by: STUDENT IN AN ORGANIZED HEALTH CARE EDUCATION/TRAINING PROGRAM

## 2023-01-09 PROCEDURE — 36415 COLL VENOUS BLD VENIPUNCTURE: CPT | Mod: HCNC | Performed by: NURSE PRACTITIONER

## 2023-01-09 PROCEDURE — 97161 PT EVAL LOW COMPLEX 20 MIN: CPT | Mod: HCNC

## 2023-01-09 PROCEDURE — 96372 THER/PROPH/DIAG INJ SC/IM: CPT | Performed by: STUDENT IN AN ORGANIZED HEALTH CARE EDUCATION/TRAINING PROGRAM

## 2023-01-09 PROCEDURE — G0378 HOSPITAL OBSERVATION PER HR: HCPCS | Mod: HCNC

## 2023-01-09 PROCEDURE — 63600175 PHARM REV CODE 636 W HCPCS: Performed by: STUDENT IN AN ORGANIZED HEALTH CARE EDUCATION/TRAINING PROGRAM

## 2023-01-09 PROCEDURE — 51705 CHANGE OF BLADDER TUBE: CPT | Mod: HCNC,,, | Performed by: UROLOGY

## 2023-01-09 PROCEDURE — 63600175 PHARM REV CODE 636 W HCPCS: Mod: HCNC | Performed by: NURSE PRACTITIONER

## 2023-01-09 PROCEDURE — 97166 OT EVAL MOD COMPLEX 45 MIN: CPT | Mod: HCNC

## 2023-01-09 PROCEDURE — 85025 COMPLETE CBC W/AUTO DIFF WBC: CPT | Mod: HCNC | Performed by: STUDENT IN AN ORGANIZED HEALTH CARE EDUCATION/TRAINING PROGRAM

## 2023-01-09 PROCEDURE — 94761 N-INVAS EAR/PLS OXIMETRY MLT: CPT | Mod: HCNC

## 2023-01-09 PROCEDURE — 99222 PR INITIAL HOSPITAL CARE,LEVL II: ICD-10-PCS | Mod: HCNC,25,, | Performed by: UROLOGY

## 2023-01-09 PROCEDURE — 25000003 PHARM REV CODE 250: Performed by: STUDENT IN AN ORGANIZED HEALTH CARE EDUCATION/TRAINING PROGRAM

## 2023-01-09 PROCEDURE — 85025 COMPLETE CBC W/AUTO DIFF WBC: CPT | Mod: 91,HCNC | Performed by: NURSE PRACTITIONER

## 2023-01-09 PROCEDURE — 97116 GAIT TRAINING THERAPY: CPT | Mod: HCNC

## 2023-01-09 PROCEDURE — 83605 ASSAY OF LACTIC ACID: CPT | Mod: HCNC | Performed by: NURSE PRACTITIONER

## 2023-01-09 PROCEDURE — 99900035 HC TECH TIME PER 15 MIN (STAT): Mod: HCNC

## 2023-01-09 PROCEDURE — 63600175 PHARM REV CODE 636 W HCPCS: Mod: HCNC | Performed by: STUDENT IN AN ORGANIZED HEALTH CARE EDUCATION/TRAINING PROGRAM

## 2023-01-09 PROCEDURE — 51705 PR CHANGE OF BLADDER TUBE,SIMPLE: ICD-10-PCS | Mod: HCNC,,, | Performed by: UROLOGY

## 2023-01-09 PROCEDURE — 25000003 PHARM REV CODE 250: Mod: HCNC | Performed by: STUDENT IN AN ORGANIZED HEALTH CARE EDUCATION/TRAINING PROGRAM

## 2023-01-09 PROCEDURE — 87040 BLOOD CULTURE FOR BACTERIA: CPT | Mod: HCNC | Performed by: STUDENT IN AN ORGANIZED HEALTH CARE EDUCATION/TRAINING PROGRAM

## 2023-01-09 PROCEDURE — 99222 1ST HOSP IP/OBS MODERATE 55: CPT | Mod: HCNC,25,, | Performed by: UROLOGY

## 2023-01-09 PROCEDURE — 27000221 HC OXYGEN, UP TO 24 HOURS: Mod: HCNC

## 2023-01-09 RX ORDER — HYDROCODONE BITARTRATE AND ACETAMINOPHEN 10; 325 MG/1; MG/1
1 TABLET ORAL EVERY 6 HOURS PRN
Status: DISCONTINUED | OUTPATIENT
Start: 2023-01-09 | End: 2023-01-10

## 2023-01-09 RX ORDER — QUETIAPINE FUMARATE 100 MG/1
200 TABLET, FILM COATED ORAL NIGHTLY
Status: DISCONTINUED | OUTPATIENT
Start: 2023-01-09 | End: 2023-01-10

## 2023-01-09 RX ORDER — ACETAMINOPHEN 325 MG/1
650 TABLET ORAL EVERY 4 HOURS PRN
Status: DISCONTINUED | OUTPATIENT
Start: 2023-01-09 | End: 2023-01-19 | Stop reason: HOSPADM

## 2023-01-09 RX ORDER — ONDANSETRON 2 MG/ML
4 INJECTION INTRAMUSCULAR; INTRAVENOUS EVERY 8 HOURS PRN
Status: DISCONTINUED | OUTPATIENT
Start: 2023-01-09 | End: 2023-01-19 | Stop reason: HOSPADM

## 2023-01-09 RX ORDER — FLUOXETINE HYDROCHLORIDE 20 MG/1
60 CAPSULE ORAL DAILY
Status: DISCONTINUED | OUTPATIENT
Start: 2023-01-09 | End: 2023-01-19 | Stop reason: HOSPADM

## 2023-01-09 RX ORDER — NALOXONE HCL 0.4 MG/ML
0.02 VIAL (ML) INJECTION
Status: DISCONTINUED | OUTPATIENT
Start: 2023-01-09 | End: 2023-01-19 | Stop reason: HOSPADM

## 2023-01-09 RX ORDER — SENNOSIDES 8.6 MG/1
2 TABLET ORAL 2 TIMES DAILY
Status: DISCONTINUED | OUTPATIENT
Start: 2023-01-09 | End: 2023-01-09 | Stop reason: SDUPTHER

## 2023-01-09 RX ORDER — FUROSEMIDE 40 MG/1
40 TABLET ORAL DAILY
Status: DISCONTINUED | OUTPATIENT
Start: 2023-01-09 | End: 2023-01-09

## 2023-01-09 RX ORDER — TALC
6 POWDER (GRAM) TOPICAL NIGHTLY PRN
Status: DISCONTINUED | OUTPATIENT
Start: 2023-01-09 | End: 2023-01-19 | Stop reason: HOSPADM

## 2023-01-09 RX ORDER — CEFAZOLIN SODIUM 1 G/50ML
1 SOLUTION INTRAVENOUS
Status: DISCONTINUED | OUTPATIENT
Start: 2023-01-09 | End: 2023-01-10

## 2023-01-09 RX ORDER — TRAZODONE HYDROCHLORIDE 100 MG/1
300 TABLET ORAL NIGHTLY
Status: DISCONTINUED | OUTPATIENT
Start: 2023-01-09 | End: 2023-01-19 | Stop reason: HOSPADM

## 2023-01-09 RX ORDER — QUETIAPINE FUMARATE 25 MG/1
25 TABLET, FILM COATED ORAL 2 TIMES DAILY PRN
Status: DISCONTINUED | OUTPATIENT
Start: 2023-01-09 | End: 2023-01-16

## 2023-01-09 RX ORDER — POTASSIUM CHLORIDE 20 MEQ/1
40 TABLET, EXTENDED RELEASE ORAL ONCE
Status: COMPLETED | OUTPATIENT
Start: 2023-01-09 | End: 2023-01-09

## 2023-01-09 RX ORDER — IBUPROFEN 200 MG
16 TABLET ORAL
Status: DISCONTINUED | OUTPATIENT
Start: 2023-01-09 | End: 2023-01-19 | Stop reason: HOSPADM

## 2023-01-09 RX ORDER — AMOXICILLIN 250 MG
1 CAPSULE ORAL 2 TIMES DAILY
Status: DISCONTINUED | OUTPATIENT
Start: 2023-01-09 | End: 2023-01-19 | Stop reason: HOSPADM

## 2023-01-09 RX ORDER — ADHESIVE BANDAGE
30 BANDAGE TOPICAL DAILY PRN
Status: DISCONTINUED | OUTPATIENT
Start: 2023-01-09 | End: 2023-01-19 | Stop reason: HOSPADM

## 2023-01-09 RX ORDER — PROMETHAZINE HYDROCHLORIDE 25 MG/1
25 TABLET ORAL EVERY 6 HOURS PRN
Status: DISCONTINUED | OUTPATIENT
Start: 2023-01-09 | End: 2023-01-19 | Stop reason: HOSPADM

## 2023-01-09 RX ORDER — ATORVASTATIN CALCIUM 40 MG/1
80 TABLET, FILM COATED ORAL DAILY
Status: DISCONTINUED | OUTPATIENT
Start: 2023-01-09 | End: 2023-01-19 | Stop reason: HOSPADM

## 2023-01-09 RX ORDER — POLYETHYLENE GLYCOL 3350 17 G/17G
17 POWDER, FOR SOLUTION ORAL DAILY
Status: DISCONTINUED | OUTPATIENT
Start: 2023-01-09 | End: 2023-01-19 | Stop reason: HOSPADM

## 2023-01-09 RX ORDER — ENOXAPARIN SODIUM 100 MG/ML
40 INJECTION SUBCUTANEOUS EVERY 24 HOURS
Status: DISCONTINUED | OUTPATIENT
Start: 2023-01-09 | End: 2023-01-19 | Stop reason: HOSPADM

## 2023-01-09 RX ORDER — IBUPROFEN 200 MG
24 TABLET ORAL
Status: DISCONTINUED | OUTPATIENT
Start: 2023-01-09 | End: 2023-01-19 | Stop reason: HOSPADM

## 2023-01-09 RX ORDER — BUTALBITAL, ACETAMINOPHEN AND CAFFEINE 50; 325; 40 MG/1; MG/1; MG/1
1 TABLET ORAL DAILY PRN
Status: DISCONTINUED | OUTPATIENT
Start: 2023-01-09 | End: 2023-01-19 | Stop reason: HOSPADM

## 2023-01-09 RX ORDER — AMOXICILLIN AND CLAVULANATE POTASSIUM 875; 125 MG/1; MG/1
1 TABLET, FILM COATED ORAL EVERY 12 HOURS
Status: DISCONTINUED | OUTPATIENT
Start: 2023-01-09 | End: 2023-01-09

## 2023-01-09 RX ORDER — PANTOPRAZOLE SODIUM 40 MG/1
40 TABLET, DELAYED RELEASE ORAL DAILY
Status: DISCONTINUED | OUTPATIENT
Start: 2023-01-09 | End: 2023-01-19 | Stop reason: HOSPADM

## 2023-01-09 RX ORDER — SODIUM CHLORIDE 0.9 % (FLUSH) 0.9 %
10 SYRINGE (ML) INJECTION EVERY 12 HOURS PRN
Status: DISCONTINUED | OUTPATIENT
Start: 2023-01-09 | End: 2023-01-19 | Stop reason: HOSPADM

## 2023-01-09 RX ORDER — ASPIRIN 81 MG/1
81 TABLET ORAL DAILY
Status: DISCONTINUED | OUTPATIENT
Start: 2023-01-09 | End: 2023-01-19 | Stop reason: HOSPADM

## 2023-01-09 RX ORDER — GLUCAGON 1 MG
1 KIT INJECTION
Status: DISCONTINUED | OUTPATIENT
Start: 2023-01-09 | End: 2023-01-19 | Stop reason: HOSPADM

## 2023-01-09 RX ORDER — POTASSIUM CHLORIDE 20 MEQ/1
20 TABLET, EXTENDED RELEASE ORAL 2 TIMES DAILY
Status: DISCONTINUED | OUTPATIENT
Start: 2023-01-09 | End: 2023-01-12

## 2023-01-09 RX ORDER — TIZANIDINE 4 MG/1
4 TABLET ORAL 2 TIMES DAILY
Status: DISCONTINUED | OUTPATIENT
Start: 2023-01-09 | End: 2023-01-10

## 2023-01-09 RX ADMIN — ONDANSETRON HYDROCHLORIDE 4 MG: 2 SOLUTION INTRAMUSCULAR; INTRAVENOUS at 04:01

## 2023-01-09 RX ADMIN — CLINDAMYCIN PHOSPHATE 900 MG: 900 INJECTION, SOLUTION INTRAVENOUS at 12:01

## 2023-01-09 RX ADMIN — SODIUM CHLORIDE 1000 ML: 0.9 INJECTION, SOLUTION INTRAVENOUS at 08:01

## 2023-01-09 RX ADMIN — QUETIAPINE FUMARATE 200 MG: 100 TABLET ORAL at 03:01

## 2023-01-09 RX ADMIN — POTASSIUM CHLORIDE 40 MEQ: 1500 TABLET, EXTENDED RELEASE ORAL at 03:01

## 2023-01-09 RX ADMIN — DOCUSATE SODIUM - SENNOSIDES 1 TABLET: 50; 8.6 TABLET, FILM COATED ORAL at 10:01

## 2023-01-09 RX ADMIN — POLYETHYLENE GLYCOL 3350 17 G: 17 POWDER, FOR SOLUTION ORAL at 09:01

## 2023-01-09 RX ADMIN — TIZANIDINE 4 MG: 4 TABLET ORAL at 10:01

## 2023-01-09 RX ADMIN — TIZANIDINE 4 MG: 4 TABLET ORAL at 09:01

## 2023-01-09 RX ADMIN — POTASSIUM CHLORIDE 20 MEQ: 1500 TABLET, EXTENDED RELEASE ORAL at 10:01

## 2023-01-09 RX ADMIN — KETOROLAC TROMETHAMINE 15 MG: 30 INJECTION, SOLUTION INTRAMUSCULAR; INTRAVENOUS at 12:01

## 2023-01-09 RX ADMIN — ENOXAPARIN SODIUM 40 MG: 40 INJECTION SUBCUTANEOUS at 04:01

## 2023-01-09 RX ADMIN — PANTOPRAZOLE SODIUM 40 MG: 40 TABLET, DELAYED RELEASE ORAL at 09:01

## 2023-01-09 RX ADMIN — LEVOTHYROXINE SODIUM 137 MCG: 25 TABLET ORAL at 07:01

## 2023-01-09 RX ADMIN — SODIUM CHLORIDE 500 ML: 0.9 INJECTION, SOLUTION INTRAVENOUS at 04:01

## 2023-01-09 RX ADMIN — TRAZODONE HYDROCHLORIDE 300 MG: 100 TABLET ORAL at 10:01

## 2023-01-09 RX ADMIN — POTASSIUM CHLORIDE 20 MEQ: 1500 TABLET, EXTENDED RELEASE ORAL at 09:01

## 2023-01-09 RX ADMIN — FUROSEMIDE 40 MG: 40 TABLET ORAL at 09:01

## 2023-01-09 RX ADMIN — ASPIRIN 81 MG: 81 TABLET, COATED ORAL at 09:01

## 2023-01-09 RX ADMIN — HYDROCODONE BITARTRATE AND ACETAMINOPHEN 1 TABLET: 10; 325 TABLET ORAL at 12:01

## 2023-01-09 RX ADMIN — CEFAZOLIN SODIUM 1 G: 1 SOLUTION INTRAVENOUS at 03:01

## 2023-01-09 RX ADMIN — FLUOXETINE 60 MG: 20 CAPSULE ORAL at 09:01

## 2023-01-09 RX ADMIN — AMOXICILLIN AND CLAVULANATE POTASSIUM 1 TABLET: 875; 125 TABLET ORAL at 07:01

## 2023-01-09 RX ADMIN — QUETIAPINE FUMARATE 200 MG: 100 TABLET ORAL at 10:01

## 2023-01-09 RX ADMIN — CEFAZOLIN SODIUM 1 G: 1 SOLUTION INTRAVENOUS at 10:01

## 2023-01-09 RX ADMIN — DOCUSATE SODIUM - SENNOSIDES 1 TABLET: 50; 8.6 TABLET, FILM COATED ORAL at 09:01

## 2023-01-09 RX ADMIN — ATORVASTATIN CALCIUM 80 MG: 40 TABLET, FILM COATED ORAL at 09:01

## 2023-01-09 RX ADMIN — TRAZODONE HYDROCHLORIDE 300 MG: 100 TABLET ORAL at 03:01

## 2023-01-09 NOTE — ED NOTES
Patient identifiers for Tasha Hawley checked and correct.  LOC: The patient is awake, alert and aware of environment with an appropriate affect, the patient is oriented x 3 and speaking appropriately.  APPEARANCE: Patient resting quietly and in no acute distress, patient is clean and well groomed, patient's clothing are properly fastened.  SKIN: The skin is warm and dry.  MUSKULOSKELETAL: Patient moving all extremities well, no obvious swelling or deformities noted.  RESPIRATORY: Airway is open and patent, respirations are spontaneous, patient has a normal effort and rate.  CARDIAC: Patient has a normal rate,  periphreal edema noted.  ABDOMEN: Soft, suprapubic catheter in place., Drainage around cath, no distention noted.

## 2023-01-09 NOTE — PLAN OF CARE
Problem: Occupational Therapy  Goal: Occupational Therapy Goal  Description: Goals to be met by: 02/09/2023      Patient will increase functional independence with ADLs by performing:    UE Dressing with Supervision.  LE Dressing with Minimal Assistance.  Grooming while standing with Stand-by Assistance.  Toileting from bedside commode or toilet with Contact Guard Assistance for hygiene and clothing management.   Toilet transfer to bedside commode or toilet with Contact Guard Assistance.  Increased functional strength to WFL for self care.  Upper extremity exercise program x10 reps per handout, with independence.     Outcome: Ongoing, Progressing   Pt would benefit from continued OT to address deficits in self care and functional mobility. Recommendations TBD pending progress with therapies. Bed Level only evaluation completed 2/2 low BP in supine HOB elevated (88/43, HR 44) and concerns for orthostatic hypotension

## 2023-01-09 NOTE — PLAN OF CARE
SOCIAL WORK DISCHARGE PLANNING ASSESSMENT    Sw completed discharge planning assessment with pt via [employer]. Pt was easily engaged and education on the role of  was provided. Pt reported she lives with her  Dangelo [phone], and Dangelo and her daughter Lisa [phone] are a big support for pt. Pt stated she has the following DME: rolling walker and shower chair. Pt reported she is [facility] with [facility] and wishes to continue with [facility] upon discharge. Sw sent referral to [facility] via [employer], so therefore pt can resume services upon discharge. Dangelo drives pt to doctor appointments and will provide transportation to family home following discharge. Pt was encouraged to call with any questions or concerns. Pt verbalized understanding.     Future Appointments   Date Time Provider Department Center   2/17/2023  8:00 AM Juan A Madera MD [facility] PSYCH [facility]   3/3/2023  2:00 PM Sunni Starks MD [facility] DERM [facility]   7/7/2023 11:00 AM Mesfin Hodges II, MD [facility] IM [facility] PCW        Patient Active Problem List   Diagnosis    Generalized anxiety disorder    Sleep apnea    Iron deficiency anemia    Major depressive disorder, recurrent, mild    Chronic pain syndrome    Cervical myelopathy    Narcotic dependency, continuous    Thoracic aorta atherosclerosis    Drug-induced constipation    GERD (gastroesophageal reflux disease)    Coronary artery disease of native artery of native heart with stable angina pectoris    Neurogenic bladder    Urinary retention    Frequent falls    Primary hypothyroidism    Lymphedema of both lower extremities    Scoliosis deformity of spine    S/P insertion of spinal cord stimulator    S/P insertion of intrathecal pump    Paresthesia of both lower extremities    Degenerative disc disease, lumbar    Osteoarthritis of spine with radiculopathy, lumbar region    Bilateral carotid artery disease    Insomnia due to medical condition     Suprapubic catheter    Esophageal dysphagia    Recurrent UTI    Mixed stress and urge urinary incontinence    Inflammatory spondylopathy of lumbar region    Pure hypercholesterolemia    Chronic diastolic heart failure    Partial small bowel obstruction    Constipation due to opioid therapy    Pain in both lower legs    History of stroke with residual deficit    Tremor    UTI (urinary tract infection)    Hyponatremia    Anxiety    Calcaneal spur of left foot    Charcot ankle, unspecified laterality    Hypokalemia        01/09/23 1036   Discharge Assessment   Assessment Type Discharge Planning Assessment   Confirmed/corrected address, phone number and insurance Yes   Confirmed Demographics Correct on Facesheet   Source of Information patient   Communicated JACKIE with patient/caregiver Date not available/Unable to determine   People in Home spouse   Facility Arrived From: home   Do you expect to return to your current living situation? Yes   Do you have help at home or someone to help you manage your care at home? Yes   Who are your caregiver(s) and their phone number(s)? pt's  Dangelo Hawley [phone]   Prior to hospitilization cognitive status: Alert/Oriented   Current cognitive status: Alert/Oriented   Walking or Climbing Stairs ambulation difficulty, requires equipment   Dressing/Bathing bathing difficulty, requires equipment   Home Accessibility wheelchair accessible   Home Layout Able to live on 1st floor   Equipment Currently Used at Home walker, rolling;shower chair   Readmission within 30 days? No   Patient currently being followed by outpatient case management? No   Do you currently have service(s) that help you manage your care at home? Yes   Name and Contact number of agency [facility]   Is the pt/caregiver preference to resume services with current agency Yes   Do you take prescription medications? Yes   Do you have prescription coverage? Yes   Coverage [employer]   Do you have  any problems affording any of your prescribed medications? No   Is the patient taking medications as prescribed? yes   Who is going to help you get home at discharge? pt's  Dangelo Hawley [phone]   How do you get to doctors appointments? family or friend will provide   Are you on dialysis? No   Do you take coumadin? No   Discharge Plan A Home Health   DME Needed Upon Discharge    (TBD)   Discharge Plan discussed with: Patient   Discharge Barriers Identified None   Physical Activity   On average, how many days per week do you engage in moderate to strenuous exercise (like a brisk walk)? 3 days   On average, how many minutes do you engage in exercise at this level? 20 min   Financial Resource Strain   How hard is it for you to pay for the very basics like food, housing, medical care, and heating? Not hard   Housing Stability   In the last 12 months, was there a time when you were not able to pay the mortgage or rent on time? N   In the last 12 months, how many places have you lived? 1   In the last 12 months, was there a time when you did not have a steady place to sleep or slept in a shelter (including now)? N   Transportation Needs   In the past 12 months, has lack of transportation kept you from medical appointments or from getting medications? no   In the past 12 months, has lack of transportation kept you from meetings, work, or from getting things needed for daily living? No   Food Insecurity   Within the past 12 months, you worried that your food would run out before you got the money to buy more. Never true   Within the past 12 months, the food you bought just didn't last and you didn't have money to get more. Never true   Stress   Do you feel stress - tense, restless, nervous, or anxious, or unable to sleep at night because your mind is troubled all the time - these days? To some exte   Social Connections   In a typical week, how many times do you talk on the phone with family, friends, or  neighbors? More than 3   How often do you get together with friends or relatives? More than 3   How often do you attend [employer] or Latter-day services? More than 4   Do you belong to any clubs or organizations such as [employer] groups, unions, fraternal or athletic groups, or school groups? Yes   How often do you attend meetings of the clubs or organizations you belong to? More than 4   Are you , , , , never , or living with a partner?    Alcohol Use   Q1: How often do you have a drink containing alcohol? Never   Q2: How many drinks containing alcohol do you have on a typical day when you are drinking? None   Q3: How often do you have six or more drinks on one occasion? Never   OTHER   Name(s) of People in Home pt's  Dangelo Hawley [phone]

## 2023-01-09 NOTE — PLAN OF CARE
Problem: Physical Therapy  Goal: Physical Therapy Goal  Description: Goals to be met by: 23     Patient will increase functional independence with mobility by performin. Supine to sit with Stand-by Assistance  2. Sit to supine with CGA  3. Sit to stand transfer with Stand-by Assistance  4. Bed to chair transfer with Stand-by Assistance using Rolling Walker  5. Gait  x 50 feet with Stand-by Assistance using Rolling Walker.     Outcome: Ongoing, Progressing     PT Eval completed, note to follow. Pt agreeable to perform sit>stand and side steps with RW and Maureen. Pt declined further ambulation and also reporting dizziness. BP taken seated EOB, see below. Pt returned supine / low BP. HR 41 bpm, nsg notified and in room to assess pt. Post acute therapy recs TBD pending progress - pt with hx of multiple falls.    23 0900 23 1239 23 1249   Vital Signs   BP (!) 96/54 (!) 88/50 (!) 101/54   MAP (mmHg)  --  64  --    BP Location  --   --  Left arm   BP Method Automatic  --   --    Patient Position Lying  seated Lying

## 2023-01-09 NOTE — ED PROVIDER NOTES
Encounter Date: 1/8/2023       History     Chief Complaint   Patient presents with    Female  Problem     Patient presents to the ED with complaints of having decreased urine output from meadows catheter and having blood in catheter bag. Urine catheter is permanent indwelling but states it is changed every three days.      67-year-old female with neurogenic bladder status post suprapubic catheter placement, CHF, hypothyroid, lymphedema.  Presenting with complaints of decreased urine output per catheter and hematuria.  Has also had pain on urination.  Outpatient urine cultures from January 5th came back positive for staph aureus pending susceptibilities.  No antibiotics as of yet.    Evaluated records from urogynecology, primary care    The history is provided by the patient, the spouse and medical records.   Review of patient's allergies indicates:   Allergen Reactions    (d)-limonene flavor      Other reaction(s): difficult intubation  Other reaction(s): Difficulty breathing    Bactrim [sulfamethoxazole-trimethoprim] Anaphylaxis    Benadryl [diphenhydramine hcl] Shortness Of Breath    Fentanyl Itching, Nausea And Vomiting and Swelling             Imitrex [sumatriptan succinate] Shortness Of Breath    Percocet [oxycodone-acetaminophen] Shortness Of Breath    Topamax [topiramate] Shortness Of Breath    Vancomycin Shortness Of Breath     Rash    Butorphanol tartrate     Darvocet a500 [propoxyphene n-acetaminophen]      Other reaction(s): Difficulty breathing    Evening primrose (oenothera biennis)     White petrolatum-zinc     Zinc oxide-white petrolatum      Other reaction(s): Difficulty breathing    Latex, natural rubber Itching and Rash    Phenytoin Rash and Other (See Comments)     Trouble breathing     Past Medical History:   Diagnosis Date    Anticoagulant long-term use     Anxiety     Arthritis     Bilateral lower extremity edema     severe chronic    Carotid artery occlusion     Cataract     CHF (congestive  heart failure)     Coronary artery disease     subtotalled LAD with collateral    Depression     Fever blister     Hard of hearing     Hypothyroid     Iron deficiency anemia     Lumbar radiculopathy     with chronic pain    Ocular migraine     Renal disorder     Sleep apnea     cpap     Past Surgical History:   Procedure Laterality Date    ADENOIDECTOMY      BACK SURGERY      x 12    CARDIAC CATHETERIZATION  2016    subtotalled LAD with right to left collaterals    CATARACT EXTRACTION W/  INTRAOCULAR LENS IMPLANT Left     Dr Coleman     CYSTOSCOPIC LITHOLAPAXY N/A 6/27/2019    Procedure: CYSTOLITHOLAPAXY;  Surgeon: Shireen Mayo MD;  Location: [facility] OR 2ND FLR;  Service: Urology;  Laterality: N/A;    CYSTOSCOPIC LITHOLAPAXY N/A 9/3/2019    Procedure: CYSTOLITHOLAPAXY;  Surgeon: Shireen Mayo MD;  Location: [facility] OR 2ND FLR;  Service: Urology;  Laterality: N/A;    CYSTOSCOPY N/A 7/13/2021    Procedure: CYSTOSCOPY;  Surgeon: Shireen Mayo MD;  Location: [facility] OR 1ST FLR;  Service: Urology;  Laterality: N/A;    CYSTOSCOPY  11/16/2021    Procedure: CYSTOSCOPY;  Surgeon: Shireen Mayo MD;  Location: [facility] OR 1ST FLR;  Service: Urology;;    CYSTOSCOPY  7/19/2022    Procedure: CYSTOSCOPY;  Surgeon: Shireen Mayo MD;  Location: [facility] OR 1ST FLR;  Service: Urology;;    CYSTOSCOPY WITH INJECTION OF PERIURETHRAL BULKING AGENT  7/19/2022    Procedure: CYSTOSCOPY, WITH PERIURETHRAL BULKING AGENT INJECTION-MACROPLASTIQUE;  Surgeon: Shireen Mayo MD;  Location: [facility] OR 1ST FLR;  Service: Urology;;    CYSTOSCOPY,WITH BOTULINUM TOXIN INJECTION N/A 12/13/2022    Procedure: CYSTOSCOPY,WITH BOTULINUM TOXIN INJECTION;  Surgeon: Shireen Mayo MD;  Location: [facility] OR 1ST FLR;  Service: Urology;  Laterality: N/A;  300 U    ESOPHAGOGASTRODUODENOSCOPY N/A 5/23/2018    Procedure: ESOPHAGOGASTRODUODENOSCOPY (EGD);  Surgeon: Prince Vance MD;  Location: [facility] ENDO (4TH FLR);  Service: Endoscopy;  Laterality: N/A;  r/s  'd per Dr. Vance due to family emergency- ER    HYSTERECTOMY  1975    endometriosis    INJECTION OF BOTULINUM TOXIN TYPE A  7/13/2021    Procedure: INJECTION, BOTULINUM TOXIN, 200units;  Surgeon: Shireen Mayo MD;  Location: [facility] OR [address] [place] [address];  Service: Urology;;    INJECTION OF BOTULINUM TOXIN TYPE A  11/16/2021    Procedure: INJECTION, BOTULINUM TOXIN, 200units;  Surgeon: Shireen Mayo MD;  Location: [facility] OR [address] [place] [address];  Service: Urology;;    INJECTION OF BOTULINUM TOXIN TYPE A  7/19/2022    Procedure: INJECTION, BOTULINUM TOXIN, 300 units ;  Surgeon: Shireen Mayo MD;  Location: [facility] OR [address] [place] [address];  Service: Urology;;    INSERTION, SUPRAPUBIC CATHETER N/A 12/13/2022    Procedure: INSERTION, SUPRAPUBIC CATHETER;  Surgeon: Shireen Mayo MD;  Location: [facility] OR [address] [place] [address];  Service: Urology;  Laterality: N/A;  exchange    pain pump placement      SQ Dilaudid Pump managed by Dr. Hillman, Pain Management    REMOVAL OF BONE SPUR OF FOOT Bilateral 9/16/2022    Procedure: EXCISION ARTHRITIC BONE, BILATERAL FOOT;  Surgeon: Adam Mcguire DPM;  Location: [facility] OR;  Service: Podiatry;  Laterality: Bilateral;    REPLACEMENT OF CATHETER N/A 10/31/2019    Procedure: REPLACEMENT, CATHETER-SUPRAPUBIC;  Surgeon: Shireen Mayo MD;  Location: [facility] OR [address] [place] [address];  Service: Urology;  Laterality: N/A;    SPINAL CORD STIMULATOR REMOVAL      before Larissa    SPINE SURGERY  5-13-13    CERVICAL FUSION    TONSILLECTOMY       Family History   Problem Relation Age of Onset    Cancer Mother 55        breast    Cancer Father         esophagus,had laryngectomy    Esophageal cancer Father     Parkinsonism Maternal Grandmother     Tremor Maternal Grandmother     No Known Problems Brother     No Known Problems Brother     Heart disease Maternal Uncle     Colon cancer Maternal Uncle         Less than 60    No Known Problems Sister     No Known Problems Maternal Aunt     Cirrhosis Paternal Aunt         ETOH    Liver disease Paternal  Aunt         ETOH    Liver disease Paternal Uncle         ETOH    Cirrhosis Paternal Uncle         ETOH    No Known Problems Maternal Grandfather     No Known Problems Paternal Grandmother     No Known Problems Paternal Grandfather     Melanoma Neg Hx     Amblyopia Neg Hx     Blindness Neg Hx     Cataracts Neg Hx     Diabetes Neg Hx     Glaucoma Neg Hx     Hypertension Neg Hx     Macular degeneration Neg Hx     Retinal detachment Neg Hx     Strabismus Neg Hx     Stroke Neg Hx     Thyroid disease Neg Hx     Celiac disease Neg Hx     Colon polyps Neg Hx     Cystic fibrosis Neg Hx     Crohn's disease Neg Hx     Inflammatory bowel disease Neg Hx     Liver cancer Neg Hx     Rectal cancer Neg Hx     Stomach cancer Neg Hx     Ulcerative colitis Neg Hx     Lymphoma Neg Hx      Social History     Tobacco Use    Smoking status: Never    Smokeless tobacco: Never   Substance Use Topics    Alcohol use: Never    Drug use: No     Review of Systems   All other systems reviewed and are negative.    Physical Exam     Initial Vitals [01/08/23 2018]   BP Pulse Resp Temp SpO2   (!) 106/54 68 16 99 °F (37.2 °C) 97 %      MAP       --         Physical Exam    Nursing note and vitals reviewed.  Constitutional: She appears well-developed. She is Obese . She has a sickly appearance. No distress.   Appears older than stated age   HENT:   Head: Normocephalic and atraumatic.   Right Ear: External ear normal.   Left Ear: External ear normal.   Nose: Nose normal.   Mouth/Throat: Oropharynx is clear and moist.   Eyes: Conjunctivae and EOM are normal. Pupils are equal, round, and reactive to light.   Neck: Neck supple.   Normal range of motion.  Cardiovascular:  Normal rate, regular rhythm, normal heart sounds and intact distal pulses.           Pulmonary/Chest: Breath sounds normal. No stridor. No respiratory distress. She has no wheezes. She has no rhonchi. She has no rales.   Abdominal: Abdomen is soft. Bowel sounds are normal. There is no  abdominal tenderness.   Genitourinary:    Genitourinary Comments: Normal external genitalia with suprapubic catheter in place with yellow urine in tubing.  Scant Bloody urine seen in bag.  Nurse chaperone in room during exam.     Musculoskeletal:         General: No tenderness or edema. Normal range of motion.      Cervical back: Normal range of motion and neck supple.     Neurological: She is alert and oriented to person, place, and time. She has normal strength. No cranial nerve deficit or sensory deficit.   Skin: Skin is warm and dry. No rash noted.   Psychiatric: She has a normal mood and affect. Thought content normal.       ED Course   ED US Guided Misc Procedure    Date/Time: 1/9/2023 12:09 AM  Performed by: Jose Rand MD  Authorized by: Jose Rand MD     Procedure:  Ultrasound-guided peripheral venous cannulation  Indication:  Failed or difficult IV access (attempts failed by multiple nurses)   Right   Antecubital  Procedure:  Dynamic ultrasound guidance used, Candidate vein examined with linear probe - confirmed collapsibility, lack of pulsatility, and proper anatomic location. and Using aseptic technique, IV catheter inserted with flash of blood noted, flow of venous blood confirmed.  Catheter gauge:  20   Flushes easily and without pain. Patient tolerated procedure well.  Complications:  None  Charge?:  Yes  Labs Reviewed   URINALYSIS, REFLEX TO URINE CULTURE - Abnormal; Notable for the following components:       Result Value    Color, UA Orange (*)     Appearance, UA Cloudy (*)     Protein, UA 3+ (*)     Occult Blood UA 3+ (*)     Leukocytes, UA 3+ (*)     All other components within normal limits    Narrative:     Specimen Source->Urine   CBC W/ AUTO DIFFERENTIAL - Abnormal; Notable for the following components:    RBC 3.73 (*)     Hemoglobin 9.2 (*)     Hematocrit 30.2 (*)     MCV 81 (*)     MCH 24.7 (*)     MCHC 30.5 (*)     RDW 15.4 (*)     All other components within normal limits    COMPREHENSIVE METABOLIC PANEL - Abnormal; Notable for the following components:    Potassium 3.2 (*)     CO2 31 (*)     Albumin 3.4 (*)     ALT 5 (*)     eGFR 55 (*)     All other components within normal limits   URINALYSIS MICROSCOPIC - Abnormal; Notable for the following components:    RBC, UA >100 (*)     WBC, UA 20 (*)     Bacteria Moderate (*)     All other components within normal limits    Narrative:     Specimen Source->Urine   CULTURE, BLOOD    Narrative:     Collection has been rescheduled by DB2 at 01/08/2023 23:18 Reason:   Patient unavailable  Collection has been rescheduled by DB2 at 01/08/2023 23:18 Reason:   Patient unavailable   CULTURE, BLOOD   CULTURE, URINE   LACTIC ACID, PLASMA   BASIC METABOLIC PANEL   CBC W/ AUTO DIFFERENTIAL          Imaging Results    None          Medications   sodium chloride 0.9% flush 10 mL (has no administration in time range)   naloxone 0.4 mg/mL injection 0.02 mg (has no administration in time range)   glucose chewable tablet 16 g (has no administration in time range)   glucose chewable tablet 24 g (has no administration in time range)   glucagon (human recombinant) injection 1 mg (has no administration in time range)   dextrose 10% bolus 125 mL 125 mL (has no administration in time range)   dextrose 10% bolus 250 mL 250 mL (has no administration in time range)   enoxaparin injection 40 mg (has no administration in time range)   ondansetron injection 4 mg (has no administration in time range)   senna-docusate 8.6-50 mg per tablet 1 tablet (has no administration in time range)   polyethylene glycol packet 17 g (has no administration in time range)   acetaminophen tablet 650 mg (has no administration in time range)   melatonin tablet 6 mg (has no administration in time range)   aspirin EC tablet 81 mg (has no administration in time range)   atorvastatin tablet 80 mg (has no administration in time range)   butalbital-acetaminophen-caffeine -40 mg per tablet 1  tablet (has no administration in time range)   FLUoxetine capsule 60 mg (has no administration in time range)   furosemide tablet 40 mg (has no administration in time range)   HYDROcodone-acetaminophen  mg per tablet 1 tablet (has no administration in time range)   levothyroxine tablet 137 mcg (has no administration in time range)   pantoprazole EC tablet 40 mg (has no administration in time range)   potassium chloride SA CR tablet 20 mEq (has no administration in time range)   promethazine tablet 25 mg (has no administration in time range)   QUEtiapine tablet 200 mg (200 mg Oral Given 1/9/23 0301)   QUEtiapine tablet 25 mg (has no administration in time range)   tiZANidine tablet 4 mg (has no administration in time range)   traZODone tablet 300 mg (300 mg Oral Given 1/9/23 0301)   ketorolac injection 15 mg (15 mg Intravenous Given 1/9/23 0015)   clindamycin in D5W 900 mg/50 mL IVPB 900 mg (0 mg Intravenous Stopped 1/9/23 0120)     Medical Decision Making:   Clinical Tests:   Lab Tests: Ordered and Reviewed       <> Summary of Lab: Urinalysis with evidence of UTI that is consistent with recent urine culture.  Current culture pending.  Stable anemia.  Mild hypokalemia.  Normal lactic acid.  Other:   I have discussed this case with another health care provider.       <> Summary of the Discussion: Discussed with [facility] to continue evaluation and management of patient with indwelling catheter and staph aureus infection.  Initiate therapy with clindamycin while in department.           ED Course as of 01/09/23 0439   Mon Jan 09, 2023   0137 Apparent clot when nursing trying to flush suprapubic catheter.  Very little output as of that point.  Adjusted suprapubic catheter and obtained approximately 300 mL output.  Resolution of some of patient's lower abdominal discomfort with this adjustment and output. [KB]      ED Course User Index  [KB] Jose Rand MD                 Clinical Impression:    Final diagnoses:  [N39.0] UTI (urinary tract infection)        ED Disposition Condition    Observation                 Jose Rand MD  01/09/23 8799

## 2023-01-09 NOTE — PT/OT/SLP EVAL
Physical Therapy Evaluation and Treatment     Patient Name:  Tasha Hawley   MRN:  [id number]    Recommendations:     Discharge Recommendations:  (TBD)   Discharge Equipment Recommendations: none   Barriers to discharge:  increased assist needed, fall risk, [place]    Assessment:     Tasha Hawley is a 67 y.o. female admitted with a medical diagnosis of UTI (urinary tract infection).  She presents with the following impairments/functional limitations: weakness, impaired endurance, impaired sensation, impaired self care skills, impaired functional mobility, decreased coordination, decreased lower extremity function, impaired balance, gait instability, decreased upper extremity function, decreased ROM, impaired skin, edema, pain, impaired joint extensibility, impaired cardiopulmonary response to activity .Pt agreeable to perform sit>stand and side steps with RW and Maureen. Pt declined further ambulation and also reporting dizziness. BP taken seated EOB, see below. Pt returned supine 2/2 low BP. HR 41 bpm, nsg notified and in room to assess pt. Post acute therapy recs TBD pending progress - pt with hx of multiple falls.     Rehab Prognosis: Good; patient would benefit from acute skilled PT services to address these deficits and reach maximum level of function.    Recent Surgery: * No surgery found *      Plan:     During this hospitalization, patient to be seen 5 x/week to address the identified rehab impairments via gait training, therapeutic exercises, therapeutic activities, neuromuscular re-education and progress toward the following goals:    Plan of Care Expires:  02/09/23    Subjective     Chief Complaint: Pain and dizziness  Patient/Family Comments/goals: pt wishes to return home with HH  Pain/Comfort:  Pain Rating 1: 9/10  Location - Side 1: Bilateral  Location 1:  (pelvis)  Pain Addressed 1: Reposition, Distraction, Cessation of Activity, Nurse notified  Pain Rating Post-Intervention 1:  (8.5/10)    Patients  cultural, spiritual, Denominational conflicts given the current situation: no    Living Environment:  Pt lives with her  in a SSH, ramp to enter, and T/S with TTB  Prior to admission, patients level of function was needing assist from spouse for ADL's and pt reports spouse stands next to her when ambulating with a rollator; pt endorses multiple falls with her last fall being last week.  Equipment used at home: walker, rolling, wheelchair, rollator, bath bench, bedside commode.  Upon discharge, patient will have assistance from spouse.    Objective:     Communicated with nsg prior to session.  Patient found HOB elevated with telemetry (suprapubic catheter)  upon PT entry to room.    General Precautions: Standard, fall (hard of hearing)  Orthopedic Precautions:N/A   Braces: N/A  Respiratory Status: Room air    Exams:  Cognitive Exam:  Patient is oriented to Person, Place, and Situation  Postural Exam:  Patient presented with the following abnormalities:    -       Rounded shoulders  -       Forward head  -       Kyphosis  Sensation:    -       Impaired  pt reports numbness/tingling to BLE and decreased sensation in B feet  Skin Integrity/Edema:      -       Skin integrity: redness B LE, healed scar medial L foot  -       Edema: Pitting 1+ BLE  RLE ROM: Limited ankle mobility 2/2 edema; hip flexion AROM limited by pain  RLE Strength: grossly 2+ to 3-/5  LLE ROM: Limited ankle mobility 2/2 edema; hip flexion AROM limited by pain  LLE Strength: grossly 2+ to 3-/5    Functional Mobility:  Bed Mobility:     Rolling Right: minimum assistance  Scooting: contact guard assistance  Supine to Sit: minimum assistance  Sit to Supine: moderate assistance for BLE  Transfers:     Sit to Stand:  minimum assistance with rolling walker  Gait: pt performed ~3-4 side steps R toward HOB with RW and Maureen. Pt demonstrating significant forward flexed posture and needing assist for RW management       AM-PAC 6 CLICK MOBILITY  Total  Score:15       Treatment & Education:  Pt educated on role of PT/POC and pt agreeable to participate in therapy session  Pt agreeable to perform sit>stand and side steps with RW and Maureen.   Pt requesting to eat lunch sitting EOB; Lunch tray positioned in front pt  Pt declined further ambulation and also reporting worsening dizziness and sensation of room spinning while sitting EOB.   BP taken seated EOB, see below.   Pt returned supine  low BP. Pt instructed to perform ankle pumps B  HR 41 bpm, nsg notified and in room to assess pt.  23 0900 23 1239 23 1249    Vital Signs   BP (!) 96/54 (!) 88/50 (!) 101/54   MAP (mmHg)  --  64  --    BP Location  --   --  Left arm   BP Method Automatic  --   --    Patient Position Lying  seated Lying       Patient left supine with all lines intact, call button in reach, bed alarm on, nsg notified, and nsg present.    GOALS:   Multidisciplinary Problems       Physical Therapy Goals          Problem: Physical Therapy    Goal Priority Disciplines Outcome Goal Variances Interventions   Physical Therapy Goal     PT, PT/OT Ongoing, Progressing     Description: Goals to be met by: 23     Patient will increase functional independence with mobility by performin. Supine to sit with Stand-by Assistance  2. Sit to supine with CGA  3. Sit to stand transfer with Stand-by Assistance  4. Bed to chair transfer with Stand-by Assistance using Rolling Walker  5. Gait  x 50 feet with Stand-by Assistance using Rolling Walker.                          History:     Past Medical History:   Diagnosis Date    Anticoagulant long-term use     Anxiety     Arthritis     Bilateral lower extremity edema     severe chronic    Carotid artery occlusion     Cataract     CHF (congestive heart failure)     Coronary artery disease     subtotalled LAD with collateral    Depression     Fever blister     Hard of hearing     Hypothyroid     Iron deficiency anemia     Lumbar radiculopathy      with chronic pain    Ocular migraine     Renal disorder     Sleep apnea     cpap       Past Surgical History:   Procedure Laterality Date    ADENOIDECTOMY      BACK SURGERY      x 12    CARDIAC CATHETERIZATION  2016    subtotalled LAD with right to left collaterals    CATARACT EXTRACTION W/  INTRAOCULAR LENS IMPLANT Left     Dr Coleman     CYSTOSCOPIC LITHOLAPAXY N/A 6/27/2019    Procedure: CYSTOLITHOLAPAXY;  Surgeon: Shireen Mayo MD;  Location: [facility] OR 2ND FLR;  Service: Urology;  Laterality: N/A;    CYSTOSCOPIC LITHOLAPAXY N/A 9/3/2019    Procedure: CYSTOLITHOLAPAXY;  Surgeon: Shireen Mayo MD;  Location: [facility] OR 2ND FLR;  Service: Urology;  Laterality: N/A;    CYSTOSCOPY N/A 7/13/2021    Procedure: CYSTOSCOPY;  Surgeon: Shireen Mayo MD;  Location: [facility] OR 1ST FLR;  Service: Urology;  Laterality: N/A;    CYSTOSCOPY  11/16/2021    Procedure: CYSTOSCOPY;  Surgeon: Shireen Mayo MD;  [facility];  Service: Urology;;    CYSTOSCOPY  7/19/2022    Procedure: CYSTOSCOPY;  Surgeon: Shireen Mayo MD;  Location: [facility] OR 1ST FLR;  Service: Urology;;    CYSTOSCOPY WITH INJECTION OF PERIURETHRAL BULKING AGENT  7/19/2022    Procedure: CYSTOSCOPY, WITH PERIURETHRAL BULKING AGENT INJECTION-MACROPLASTIQUE;  Surgeon: Shireen Mayo MD;  [facility];  Service: Urology;;    CYSTOSCOPY,WITH BOTULINUM TOXIN INJECTION N/A 12/13/2022    Procedure: CYSTOSCOPY,WITH BOTULINUM TOXIN INJECTION;  Surgeon: Shireen Mayo MD;  Location: [facility] OR 1ST FLR;  Service: Urology;  Laterality: N/A;  300 U    ESOPHAGOGASTRODUODENOSCOPY N/A 5/23/2018    Procedure: ESOPHAGOGASTRODUODENOSCOPY (EGD);  Surgeon: Prince Vance MD;  Location: [facility] (4TH FLR);  Service: Endoscopy;  Laterality: N/A;  r/s 'd per Dr. Vance due to family emergency- ER    HYSTERECTOMY  1975    endometriosis    INJECTION OF BOTULINUM TOXIN TYPE A  7/13/2021    Procedure: INJECTION, BOTULINUM TOXIN, 200units;  Surgeon:  Shireen Mayo MD;  Location: [facility] OR [address] [place] [address];  Service: Urology;;    INJECTION OF BOTULINUM TOXIN TYPE A  11/16/2021    Procedure: INJECTION, BOTULINUM TOXIN, 200units;  Surgeon: Shireen Mayo MD;  Location: [facility] OR [address] [place] [address];  Service: Urology;;    INJECTION OF BOTULINUM TOXIN TYPE A  7/19/2022    Procedure: INJECTION, BOTULINUM TOXIN, 300 units ;  Surgeon: Shireen Mayo MD;  Location: [facility] OR [address] [place] [address];  Service: Urology;;    INSERTION, SUPRAPUBIC CATHETER N/A 12/13/2022    Procedure: INSERTION, SUPRAPUBIC CATHETER;  Surgeon: Shireen Mayo MD;  Location: [facility] OR [address] [place] [address];  Service: Urology;  Laterality: N/A;  exchange    pain pump placement      SQ Dilaudid Pump managed by Dr. Hillman, Pain Management    REMOVAL OF BONE SPUR OF FOOT Bilateral 9/16/2022    Procedure: EXCISION ARTHRITIC BONE, BILATERAL FOOT;  Surgeon: NICOLA Mcgrath;  Location: [facility] OR;  Service: Podiatry;  Laterality: Bilateral;    REPLACEMENT OF CATHETER N/A 10/31/2019    Procedure: REPLACEMENT, CATHETER-SUPRAPUBIC;  Surgeon: Shireen Mayo MD;  Location: [facility] OR [address] [place] [address];  Service: Urology;  Laterality: N/A;    SPINAL CORD STIMULATOR REMOVAL      before [place]    SPINE SURGERY  5-13-13    CERVICAL FUSION    TONSILLECTOMY         Time Tracking:     PT Received On: 01/09/23  PT Start Time: 1155     PT Stop Time: 1252  PT Total Time (min): 57 min     Billable Minutes: Evaluation 17, Gait Training 10, and Therapeutic Activity 30      01/09/2023

## 2023-01-09 NOTE — NURSING
Pt arrived to room 403. Pt ambulated  from stretcher to bed w/o difficulty. Contact board updated with plan of care and nurse direct extension, pt oriented to room and use off call light. Belongings and call light left within reach vitals taken bed locked in lowest position alarm set w 3/4 side rails up . Vn notified of pt arrival

## 2023-01-09 NOTE — CARE UPDATE
Patient seen at bedside just after arriving from ED. She is ill-appearing and c/o significant back/leg/abd pain. She states this is chronic pain and that she has a dilaudid pump that was recently refilled. She is asking for Vicodin ES 10 mg q4h specifically, however, upon chart review and LA  it does not appear that this has been prescribed to her. She has norco 10/325 mg q6h on her home medication list so will reconcile that if not already done. Will also consult PT/OT.     Otherwise, patient denies other complaints. Motley is cloudy but draining well w/o hematuria noted. She denies CP, SOB, leg swelling, N/V, or palpitations. Will continue abx at this time for UTI.     Potassium 3.2 so KCl 20 meq BID started. Maintain telemetry.    Physical Exam  Vitals and nursing note reviewed.   Constitutional:       General: She is not in acute distress.     Appearance: She is obese. She is ill-appearing.   HENT:      Mouth/Throat:      Mouth: Mucous membranes are dry.   Cardiovascular:      Rate and Rhythm: Normal rate and regular rhythm.      Pulses: Normal pulses.      Heart sounds: Normal heart sounds.   Pulmonary:      Effort: Pulmonary effort is normal. No respiratory distress.      Breath sounds: Decreased breath sounds present.   Abdominal:      General: Bowel sounds are normal. There is no distension.      Palpations: Abdomen is soft.      Tenderness: There is no abdominal tenderness.   Genitourinary:     Comments: Suprapubic catheter present  Musculoskeletal:         General: No tenderness.      Right lower le+ Edema present.      Left lower le+ Edema present.   Neurological:      Mental Status: She is alert and oriented to person, place, and time.      Motor: Weakness present.   Psychiatric:         Mood and Affect: Mood normal.         Behavior: Behavior normal.         Thought Content: Thought content normal.         Judgment: Judgment normal.

## 2023-01-09 NOTE — ASSESSMENT & PLAN NOTE
Decreased urine output.   Recently exchanged. Exchanged every 3 days  ED provider repositioned it this admission  Continue to monitor urine output. If decreased, consider urology consult

## 2023-01-09 NOTE — PHARMACY MED REC
"[facility]           Pharmacy  Admission Medication Reconciliation     Based on information gathered for medication list, you may go to "Admission" then "Reconcile Home Medications" tabs to review and/or act upon those items.     The home medication list has been updated by the Pharmacy department.   Please read ALL comments highlighted in red.   Please address this information as you see fit.    Feel free to contact us if you have any questions or require assistance.    Feel free to contact us if you have any questions or require assistance.    Genny Eugene, PharmD  [phone]                .        "

## 2023-01-09 NOTE — H&P
[facility] Emergency [facility] Medicine  History & Physical    Patient Name: Tasha Hawley  MRN: [id number]  Patient Class: OP- Observation  Admission Date: 1/8/2023  Attending Physician: Barber Allen MD  Primary Care Provider: Mesfin Hodges Ii, MD         Patient information was obtained from patient and ER records.     Subjective:     Principal Problem:UTI (urinary tract infection)    Chief Complaint:   Chief Complaint   Patient presents with    Female  Problem     Patient presents to the ED with complaints of having decreased urine output from meadows catheter and having blood in catheter bag. Urine catheter is permanent indwelling but states it is changed every three days.         HPI: Tasha Villalpando is a 67yr female with PMH Neurogenic bladder s/p suprapubic placement, CHF, hypothyroidism who presents with decreased urine output from catheter and hematuria.    Pt states that she has had pain from suprapubic catheter, with decreased urine output and hematuria. Catheter is changed every three days, per patient. She states she recently had an oupatient urine cultures positive for staph, but pending susceptibilities. She reports allergies to vancomycin and bactrium.    In the ED, initial triage vitals were significant for slightly low Bps. CBC showed microcytic anemia. BMP showed hyponatremia and hypokalemia. UA showed several WBC and RBC with moderate bacteria. UC from 01/06 resulted showing oxacillin and bactrium sensitive staph aureus. BC and UC from 01/08 are pending.    While in the ED, pt complained of lower abdominal pain and decreased output, with apparent clot noted on flushing. With manipulation, provider repositioned catheter, quickly obtaining 300cc of output shortly afterwards. Pt  was started on clindamycin for UTI.    Medicine accepted pt for UTI.      Past Medical History:   Diagnosis Date    Anticoagulant long-term use     Anxiety     Arthritis     Bilateral lower extremity  edema     severe chronic    Carotid artery occlusion     Cataract     CHF (congestive heart failure)     Coronary artery disease     subtotalled LAD with collateral    Depression     Fever blister     Hard of hearing     Hypothyroid     Iron deficiency anemia     Lumbar radiculopathy     with chronic pain    Ocular migraine     Renal disorder     Sleep apnea     cpap       Past Surgical History:   Procedure Laterality Date    ADENOIDECTOMY      BACK SURGERY      x 12    CARDIAC CATHETERIZATION  2016    subtotalled LAD with right to left collaterals    CATARACT EXTRACTION W/  INTRAOCULAR LENS IMPLANT Left     Dr Coleman     CYSTOSCOPIC LITHOLAPAXY N/A 6/27/2019    Procedure: CYSTOLITHOLAPAXY;  Surgeon: Shireen Mayo MD;  Location: [facility] OR 2ND FLR;  Service: Urology;  Laterality: N/A;    CYSTOSCOPIC LITHOLAPAXY N/A 9/3/2019    Procedure: CYSTOLITHOLAPAXY;  Surgeon: Shireen Mayo MD;  Location: [facility] OR 2ND FLR;  Service: Urology;  Laterality: N/A;    CYSTOSCOPY N/A 7/13/2021    Procedure: CYSTOSCOPY;  Surgeon: Shireen Mayo MD;  Location: [facility] OR 1ST FLR;  Service: Urology;  Laterality: N/A;    CYSTOSCOPY  11/16/2021    Procedure: CYSTOSCOPY;  Surgeon: Shireen Mayo MD;  Location: [facility] OR 1ST FLR;  Service: Urology;;    CYSTOSCOPY  7/19/2022    Procedure: CYSTOSCOPY;  Surgeon: Shireen Mayo MD;  [facility];  Service: Urology;;    CYSTOSCOPY WITH INJECTION OF PERIURETHRAL BULKING AGENT  7/19/2022    Procedure: CYSTOSCOPY, WITH PERIURETHRAL BULKING AGENT INJECTION-MACROPLASTIQUE;  Surgeon: Shireen Mayo MD;  Location: [facility] OR 1ST FLR;  Service: Urology;;    CYSTOSCOPY,WITH BOTULINUM TOXIN INJECTION N/A 12/13/2022    Procedure: CYSTOSCOPY,WITH BOTULINUM TOXIN INJECTION;  Surgeon: Shireen Mayo MD;  [facility];  Service: Urology;  Laterality: N/A;  300 U    ESOPHAGOGASTRODUODENOSCOPY N/A 5/23/2018    Procedure: ESOPHAGOGASTRODUODENOSCOPY  (EGD);  Surgeon: Prince Vance MD;  Location: [facility] (4TH FLR);  Service: Endoscopy;  Laterality: N/A;  r/s 'd per Dr. Vance due to family emergency- ER    HYSTERECTOMY  1975    endometriosis    INJECTION OF BOTULINUM TOXIN TYPE A  7/13/2021    Procedure: INJECTION, BOTULINUM TOXIN, 200units;  Surgeon: Shireen Mayo MD;  [facility];  Service: Urology;;    INJECTION OF BOTULINUM TOXIN TYPE A  11/16/2021    Procedure: INJECTION, BOTULINUM TOXIN, 200units;  Surgeon: Shireen Mayo MD;  [facility];  Service: Urology;;    INJECTION OF BOTULINUM TOXIN TYPE A  7/19/2022    Procedure: INJECTION, BOTULINUM TOXIN, 300 units ;  Surgeon: Shireen Mayo MD;  [facility];  Service: Urology;;    INSERTION, SUPRAPUBIC CATHETER N/A 12/13/2022    Procedure: INSERTION, SUPRAPUBIC CATHETER;  Surgeon: Shireen Mayo MD;  [facility];  Service: Urology;  Laterality: N/A;  exchange    pain pump placement      SQ Dilaudid Pump managed by Dr. Hillman, Pain Management    REMOVAL OF BONE SPUR OF FOOT Bilateral 9/16/2022    Procedure: EXCISION ARTHRITIC BONE, BILATERAL FOOT;  Surgeon: Adam Mcguire [facility];  Location: [facility];  Service: Podiatry;  Laterality: Bilateral;    REPLACEMENT OF CATHETER N/A 10/31/2019    Procedure: REPLACEMENT, CATHETER-SUPRAPUBIC;  Surgeon: Shireen Mayo MD;  Location: [facility] OR [address] [place] [address];  Service: Urology;  Laterality: N/A;    SPINAL CORD STIMULATOR REMOVAL      before [place]    SPINE SURGERY  5-13-13    CERVICAL FUSION    TONSILLECTOMY         Review of patient's allergies indicates:   Allergen Reactions    (d)-limonene flavor      Other reaction(s): difficult intubation  Other reaction(s): Difficulty breathing    Bactrim [sulfamethoxazole-trimethoprim] Anaphylaxis    Benadryl [diphenhydramine hcl] Shortness Of Breath    Fentanyl Itching, Nausea And Vomiting and Swelling             Imitrex [sumatriptan  succinate] Shortness Of Breath    Percocet [oxycodone-acetaminophen] Shortness Of Breath    Topamax [topiramate] Shortness Of Breath    Vancomycin Shortness Of Breath     Rash    Butorphanol tartrate     Darvocet a500 [propoxyphene n-acetaminophen]      Other reaction(s): Difficulty breathing    Evening primrose (oenothera biennis)     White petrolatum-zinc     Zinc oxide-white petrolatum      Other reaction(s): Difficulty breathing    Latex, natural rubber Itching and Rash    Phenytoin Rash and Other (See Comments)     Trouble breathing       No current facility-administered medications on file prior to encounter.     Current Outpatient Medications on File Prior to Encounter   Medication Sig    aspirin (ECOTRIN) 81 MG EC tablet Take 1 tablet (81 mg total) by mouth once daily.    atorvastatin (LIPITOR) 80 MG tablet TAKE ONE TABLET BY MOUTH EVERY DAY    butalbital-acetaminophen-caffeine -40 mg (FIORICET, ESGIC) -40 mg per tablet Take 1 tablet by mouth daily as needed.    FLUoxetine 20 MG capsule Take 3 capsules (60 mg total) by mouth once daily.    furosemide (LASIX) 40 MG tablet Take 1 tablet (40 mg total) by mouth once daily.    HYDROcodone-acetaminophen (NORCO)  mg per tablet Take 1 tablet by mouth every 6 (six) hours as needed for Pain.    intrathecal pain pump compound Hydromorphone (7.5 mg/mL) infusion at 3.6799 mg/day (0.1533 mg/hr) out of a total reservoir volume of 37.3 mL  Pump filled every 2 months    levothyroxine (SYNTHROID) 137 MCG Tab tablet Take 1 tablet (137 mcg total) by mouth before breakfast.    nitroGLYCERIN (NITROSTAT) 0.4 MG SL tablet Place 1 tablet (0.4 mg total) under the tongue every 5 (five) minutes as needed for Chest pain.    pantoprazole (PROTONIX) 40 MG tablet Take 1 tablet (40 mg total) by mouth once daily.    potassium chloride SA (K-DUR,KLOR-CON) 20 MEQ tablet Take 1 tablet (20 mEq total) by mouth 2 (two) times daily.    promethazine  (PHENERGAN) 25 MG tablet Take 25 mg by mouth every 6 (six) hours as needed for Nausea.    QUEtiapine (SEROQUEL) 100 MG Tab TAKE 2 TABLETS (200 MG) BY MOUTH NIGHTLY    QUEtiapine (SEROQUEL) 25 MG Tab Take 12.5-25 mg twice daily as needed for anxiety    senna (SENNA LAX) 8.6 mg tablet Take 2 tablets by mouth 2 (two) times daily.    tiZANidine (ZANAFLEX) 4 MG tablet Take 4 mg by mouth 2 (two) times daily.    traZODone (DESYREL) 300 MG tablet Take 1 tablet (300 mg total) by mouth every evening.     Family History       Problem Relation (Age of Onset)    Cancer Mother (55), Father    Cirrhosis Paternal Aunt, Paternal Uncle    Colon cancer Maternal Uncle    Esophageal cancer Father    Heart disease Maternal Uncle    Liver disease Paternal Aunt, Paternal Uncle    No Known Problems Brother, Brother, Sister, Maternal Aunt, Maternal Grandfather, Paternal Grandmother, Paternal Grandfather    Parkinsonism Maternal Grandmother    Tremor Maternal Grandmother          Tobacco Use    Smoking status: Never    Smokeless tobacco: Never   Substance and Sexual Activity    Alcohol use: Never    Drug use: No    Sexual activity: Never     Partners: Male     Review of Systems   Constitutional:  Negative for appetite change and chills.        Looks chronically ill   HENT:  Negative for drooling and ear discharge.    Eyes:  Negative for pain and redness.   Respiratory:  Negative for cough and shortness of breath.    Cardiovascular:  Negative for palpitations and leg swelling.   Gastrointestinal:  Positive for abdominal pain.   Endocrine: Negative for polydipsia and polyphagia.   Genitourinary:  Positive for dysuria and hematuria.   Musculoskeletal:  Negative for joint swelling and myalgias.   Skin:  Negative for pallor and rash.   Allergic/Immunologic: Negative for food allergies and immunocompromised state.   Neurological:  Negative for seizures and numbness.   Hematological:  Negative for adenopathy. Does not bruise/bleed easily.    Psychiatric/Behavioral:  The patient is not hyperactive.    Objective:     Vital Signs (Most Recent):  Temp: 98.6 °F (37 °C) (01/09/23 0025)  Pulse: (!) 51 (01/09/23 0417)  Resp: 18 (01/09/23 0417)  BP: (!) 91/46 (01/09/23 0417)  SpO2: (!) 92 % (01/09/23 0417)   Vital Signs (24h Range):  Temp:  [98.6 °F (37 °C)-99 °F (37.2 °C)] 98.6 °F (37 °C)  Pulse:  [51-68] 51  Resp:  [16-18] 18  SpO2:  [92 %-97 %] 92 %  BP: ()/(46-54) 91/46     Weight: 86.6 kg (191 lb)  Body mass index is 29.91 kg/m².    Physical Exam  Constitutional:       General: She is not in acute distress.     Appearance: She is obese.   HENT:      Head: Atraumatic.      Right Ear: There is no impacted cerumen.      Left Ear: There is no impacted cerumen.      Nose: No congestion or rhinorrhea.      Mouth/Throat:      Pharynx: No oropharyngeal exudate or posterior oropharyngeal erythema.   Eyes:      General:         Right eye: No discharge.         Left eye: No discharge.   Cardiovascular:      Rate and Rhythm: Bradycardia present.      Heart sounds: No murmur heard.    No gallop.   Pulmonary:      Breath sounds: No wheezing or rales.   Abdominal:      Tenderness: There is no abdominal tenderness. There is no guarding.   Genitourinary:     Comments: Suprapubic catether in place, with some dark-casa red urine noted  Musculoskeletal:         General: No swelling or deformity.   Skin:     Coloration: Skin is not jaundiced.      Findings: No bruising.   Neurological:      Coordination: Coordination normal.      Deep Tendon Reflexes: Reflexes normal.   Psychiatric:         Mood and Affect: Mood normal.         Behavior: Behavior normal.           Significant Labs: All pertinent labs within the past 24 hours have been reviewed.  A1C:   Recent Labs   Lab 07/28/22  0326   HGBA1C 5.9*     ABGs: No results for input(s): PH, PCO2, HCO3, POCSATURATED, BE, TOTALHB, COHB, METHB, O2HB, POCFIO2, PO2 in the last 48 hours.  Bilirubin:   Recent Labs   Lab  01/08/23  2347   BILITOT 0.6     Blood Culture: No results for input(s): LABBLOO in the last 48 hours.  BMP:   Recent Labs   Lab 01/09/23  0443   GLU 96   *   K 3.2*   CL 98   CO2 29   BUN 13   CREATININE 0.9   CALCIUM 8.7     CBC:   Recent Labs   Lab 01/08/23  2348 01/09/23  0443   WBC 7.03 5.91   HGB 9.2* 8.4*   HCT 30.2* 27.5*    167     CMP:   Recent Labs   Lab 01/08/23  2347 01/09/23  0443    135*   K 3.2* 3.2*   CL 95 98   CO2 31* 29   GLU 90 96   BUN 12 13   CREATININE 1.1 0.9   CALCIUM 9.3 8.7   PROT 7.6  --    ALBUMIN 3.4*  --    BILITOT 0.6  --    ALKPHOS 105  --    AST 10  --    ALT 5*  --    ANIONGAP 11 8     Lipid Panel: No results for input(s): CHOL, HDL, LDLCALC, TRIG, CHOLHDL in the last 48 hours.  Troponin: No results for input(s): TROPONINI, TROPONINIHS in the last 48 hours.  TSH:   Recent Labs   Lab 07/22/22  1837   TSH 11.054*     Urine Studies:   Recent Labs   Lab 01/08/23  2212   COLORU Orange*   APPEARANCEUA Cloudy*   PHUR 6.0   SPECGRAV 1.015   PROTEINUA 3+*   GLUCUA Negative   KETONESU Negative   BILIRUBINUA Negative   OCCULTUA 3+*   NITRITE Negative   UROBILINOGEN Negative   LEUKOCYTESUR 3+*   RBCUA >100*   WBCUA 20*   BACTERIA Moderate*   HYALINECASTS 0       Significant Imaging: I have reviewed all pertinent imaging results/findings within the past 24 hours.  I have reviewed and interpreted all pertinent imaging results/findings within the past 24 hours.    Assessment/Plan:     * UTI (urinary tract infection)  ->UA/UC from 01/06 showed staph aureus, sensitive to oxacillin and bacterium  ->repeat UA and UC from 01/08 pending,   ->s/p clindamycin in ED.   ->start oxacillin or augmentin for a total 5-7 day course        Hypokalemia  Potassium of 3.2.  ->give Kcl 40meq x1      Anxiety  C/w fluoxetine dose      Hyponatremia  ->Likely hypovolemic hyponatremia  ->continue to monitor  -> encourage fluid intake      Constipation due to opioid therapy  C/w aggressive bowel  regimen       Chronic diastolic heart failure  C/s lasix daily      Suprapubic catheter  Decreased urine output.   Recently exchanged. Exchanged every 3 days  ED provider repositioned it this admission  Continue to monitor urine output. If decreased, consider urology consult      Primary hypothyroidism  C/w home thyroid medication      Narcotic dependency, continuous  Currently on home pump infusion  Has not needed breathrough pain medications as of yet      Iron deficiency anemia  May continue iron supplementation  May consider iron/ferritin labs      Sleep apnea  C/w CPAP at night        VTE Risk Mitigation (From admission, onward)         Ordered     enoxaparin injection 40 mg  Daily         01/09/23 0107     IP VTE HIGH RISK PATIENT  Once         01/09/23 0107     Place sequential compression device  Until discontinued         01/09/23 0107                   Barber Allen MD  Department of Hospital Medicine   [place] - Emergency Dept

## 2023-01-09 NOTE — PROGRESS NOTES
VN cued into room.  Pt resting comfortably in bed with call light and personal belongings within reach.  NADN.  No family at bedside.  Pt reports no pain.  No other needs or complaints at this time.  Reminded pt to use call light as needed.  VN will continue to follow and be available as needed.       01/09/23 1011   Admission   Initial VN Admission Questions Complete   Communication Issues? Patient Hearing   Shift   Virtual Nurse - Rounding Complete   Virtual Nurse - Patient Verbalized Approval Of Camera Use;VN Rounding   Type of Frequent Check   Type Other (see comments)  (admission)   Safety/Activity   Patient Rounds bed in low position;placement of personal items at bedside;bed wheels locked;call light in patient/parent reach;visualized patient;clutter free environment maintained   Safety Promotion/Fall Prevention assistive device/personal item within reach

## 2023-01-09 NOTE — HPI
Tasha Villalpando is a 67yr female with PMH Neurogenic bladder s/p suprapubic placement, CHF, hypothyroidism who presents with decreased urine output from catheter and hematuria.    Pt states that she has had pain from suprapubic catheter, with decreased urine output and hematuria. Catheter is changed every three days, per patient. She states she recently had an oupatient urine cultures positive for staph, but pending susceptibilities. She reports allergies to vancomycin and bactrium.    In the ED, initial triage vitals were significant for slightly low Bps. CBC showed microcytic anemia. BMP showed hyponatremia and hypokalemia. UA showed several WBC and RBC with moderate bacteria. UC from 01/06 resulted showing oxacillin and bactrium sensitive staph aureus. BC and UC from 01/08 are pending.    While in the ED, pt complained of lower abdominal pain and decreased output, with apparent clot noted on flushing. With manipulation, provider repositioned catheter, quickly obtaining 300cc of output shortly afterwards. Pt  was started on clindamycin for UTI.    Medicine accepted pt for UTI.

## 2023-01-09 NOTE — CONSULTS
[place] [facility]  [facility]rology  Consult Note    Patient Name: Tasha Hawley  MRN: [id number]  Admission Date: 1/8/2023  Attending Provider: Nic Mistry, *   Consulting Provider: Myles Meneses MD  Principal Problem:UTI (urinary tract infection)    Inpatient consult to Urology  Consult performed by: Myles Meneses MD  Consult ordered by: Lisa Carrasco NP        Subjective:     HPI:   Mrs. Tasha Hawley is a 67 y.o. female who presents with UTI and decreased UOP from suprapubic tube, hematuria.  Recently had botox injection with Dr. Mayo 12/15/22.  She has chronic SPT, had recent culture with staph, sens pending.  Noted decreased UOP fro SPT and repositioned in ED with 300 cc output per report. Normally has changed by home health frequently.  Urology consulted for SPT change, UTI.      Notes some nausea at current, low BP getting bolus.        Past Medical History:   Diagnosis Date    Anticoagulant long-term use     Anxiety     Arthritis     Bilateral lower extremity edema     severe chronic    Carotid artery occlusion     Cataract     CHF (congestive heart failure)     Coronary artery disease     subtotalled LAD with collateral    Depression     Fever blister     Hard of hearing     Hypothyroid     Iron deficiency anemia     Lumbar radiculopathy     with chronic pain    Ocular migraine     Renal disorder     Sleep apnea     cpap       Past Surgical History:   Procedure Laterality Date    ADENOIDECTOMY      BACK SURGERY      x 12    CARDIAC CATHETERIZATION  2016    subtotalled LAD with right to left collaterals    CATARACT EXTRACTION W/  INTRAOCULAR LENS IMPLANT Left     Dr Coleman     CYSTOSCOPIC LITHOLAPAXY N/A 6/27/2019    Procedure: CYSTOLITHOLAPAXY;  Surgeon: Shireen Mayo MD;  Location: [facility] OR [address] [place] [address];  Service: Urology;  Laterality: N/A;    CYSTOSCOPIC LITHOLAPAXY N/A 9/3/2019    Procedure: CYSTOLITHOLAPAXY;  Surgeon: Shireen Mayo MD;  Location: [facility] OR [address] [place] [address];  Service:  Urology;  Laterality: N/A;    CYSTOSCOPY N/A 7/13/2021    Procedure: CYSTOSCOPY;  Surgeon: Shireen Mayo MD;  [facility];  Service: Urology;  Laterality: N/A;    CYSTOSCOPY  11/16/2021    Procedure: CYSTOSCOPY;  Surgeon: Shireen Mayo MD;  [facility];  Service: Urology;;    CYSTOSCOPY  7/19/2022    Procedure: CYSTOSCOPY;  Surgeon: Shireen Mayo MD;  [facility];  Service: Urology;;    CYSTOSCOPY WITH INJECTION OF PERIURETHRAL BULKING AGENT  7/19/2022    Procedure: CYSTOSCOPY, WITH PERIURETHRAL BULKING AGENT INJECTION-MACROPLASTIQUE;  Surgeon: Shireen Mayo MD;  [facility];  Service: Urology;;    CYSTOSCOPY,WITH BOTULINUM TOXIN INJECTION N/A 12/13/2022    Procedure: CYSTOSCOPY,WITH BOTULINUM TOXIN INJECTION;  Surgeon: Shireen Mayo MD;  [facility];  Service: Urology;  Laterality: N/A;  300 U    ESOPHAGOGASTRODUODENOSCOPY N/A 5/23/2018    Procedure: ESOPHAGOGASTRODUODENOSCOPY (EGD);  Surgeon: Prince Vance MD;  Location: [facility] (4TH FLR);  Service: Endoscopy;  Laterality: N/A;  r/s 'd per Dr. Vance due to family emergency- ER    HYSTERECTOMY  1975    endometriosis    INJECTION OF BOTULINUM TOXIN TYPE A  7/13/2021    Procedure: INJECTION, BOTULINUM TOXIN, 200units;  Surgeon: Shireen Mayo MD;  [facility];  Service: Urology;;    INJECTION OF BOTULINUM TOXIN TYPE A  11/16/2021    Procedure: INJECTION, BOTULINUM TOXIN, 200units;  Surgeon: Shireen Mayo MD;  [facility];  Service: Urology;;    INJECTION OF BOTULINUM TOXIN TYPE A  7/19/2022    Procedure: INJECTION, BOTULINUM TOXIN, 300 units ;  Surgeon: Shireen Mayo MD;  [facility];  Service: Urology;;    INSERTION, SUPRAPUBIC CATHETER N/A 12/13/2022    Procedure: INSERTION, SUPRAPUBIC CATHETER;  Surgeon: Shireen Mayo MD;  Location: [facility] OR [address] [place] [address];  Service: Urology;  Laterality: N/A;  exchange    pain pump  placement      SQ Dilaudid Pump managed by Dr. Hillman, Pain Management    REMOVAL OF BONE SPUR OF FOOT Bilateral 9/16/2022    Procedure: EXCISION ARTHRITIC BONE, BILATERAL FOOT;  Surgeon: Adam Mcguire DPM;  Location: [facility];  Service: Podiatry;  Laterality: Bilateral;    REPLACEMENT OF CATHETER N/A 10/31/2019    Procedure: REPLACEMENT, CATHETER-SUPRAPUBIC;  Surgeon: Shireen Mayo MD;  Location: [address];  Service: Urology;  Laterality: N/A;    SPINAL CORD STIMULATOR REMOVAL      before Larissa    SPINE SURGERY  5-13-13    CERVICAL FUSION    TONSILLECTOMY         Review of patient's allergies indicates:   Allergen Reactions    (d)-limonene flavor      Other reaction(s): difficult intubation  Other reaction(s): Difficulty breathing    Bactrim [sulfamethoxazole-trimethoprim] Anaphylaxis    Benadryl [diphenhydramine hcl] Shortness Of Breath    Fentanyl Itching, Nausea And Vomiting and Swelling             Imitrex [sumatriptan succinate] Shortness Of Breath    Percocet [oxycodone-acetaminophen] Shortness Of Breath    Topamax [topiramate] Shortness Of Breath    Vancomycin Shortness Of Breath     Rash    Butorphanol tartrate     Darvocet a500 [propoxyphene n-acetaminophen]      Other reaction(s): Difficulty breathing    Evening primrose (oenothera biennis)     White petrolatum-zinc     Zinc oxide-white petrolatum      Other reaction(s): Difficulty breathing    Latex, natural rubber Itching and Rash    Phenytoin Rash and Other (See Comments)     Trouble breathing       Family History       Problem Relation (Age of Onset)    Cancer Mother (55), Father    Cirrhosis Paternal Aunt, Paternal Uncle    Colon cancer Maternal Uncle    Esophageal cancer Father    Heart disease Maternal Uncle    Liver disease Paternal Aunt, Paternal Uncle    No Known Problems Brother, Brother, Sister, Maternal Aunt, Maternal Grandfather, Paternal Grandmother, Paternal Grandfather    Parkinsonism Maternal Grandmother    Tremor  Maternal Grandmother            Tobacco Use    Smoking status: Never    Smokeless tobacco: Never   Substance and Sexual Activity    Alcohol use: Never    Drug use: No    Sexual activity: Never     Partners: Male       Review of Systems   Constitutional:  Positive for fatigue.   Respiratory:  Negative for cough and shortness of breath.    Cardiovascular:  Negative for chest pain.   Gastrointestinal:  Positive for abdominal pain and nausea. Negative for abdominal distention.   Genitourinary:  Positive for difficulty urinating and hematuria.     Objective:     Temp:  [97.8 °F (36.6 °C)-99 °F (37.2 °C)] 97.8 °F (36.6 °C)  Pulse:  [41-68] 48  Resp:  [16-18] 18  SpO2:  [92 %-97 %] 95 %  BP: ()/(46-54) 83/48       NAD  RRR  CTAB  ABD S/ND/NTTP       SPT in place with clear urine.  Removed existing SPT.     Under sterile conditions, 20F SPT catheter placed into bladder with return of clear urine.  Balloon inflated with 25cc sterile water.  Placed to gravity drainage.  Irrigated easily, 1 tiny clot returned.             Ext: no edema      Significant Labs:  BMP:  Recent Labs   Lab 01/08/23  2347 01/09/23  0443    135*   K 3.2* 3.2*   CL 95 98   CO2 31* 29   BUN 12 13   CREATININE 1.1 0.9   CALCIUM 9.3 8.7       CBC:  Recent Labs   Lab 01/08/23  2348 01/09/23  0443   WBC 7.03 5.91   HGB 9.2* 8.4*   HCT 30.2* 27.5*    167       Urinalysis  Recent Labs   Lab 01/08/23  2212   COLORU Orange*   SPECGRAV 1.015   PHUR 6.0   PROTEINUA 3+*   BACTERIA Moderate*   NITRITE Negative   LEUKOCYTESUR 3+*   UROBILINOGEN Negative   HYALINECASTS 0       No results found. However, due to the size of the patient record, not all encounters were searched. Please check Results Review for a complete set of results.        Assessment:     Problem Noted   Uti (Urinary Tract Infection) 6/3/2021   Neurogenic Bladder 9/27/2016   Urinary Retention 12/21/2016   Suprapubic Catheter 2/1/2018   Recurrent Uti 6/8/2018               Plan:      SPT changed, irrigates easily  Follow up cultures, treat UTI  Follow up with Dr. Mayo as outpatient, established patient of Dr. Mayo and recent procedure  Will sign off please call with questions.     Thank you for your consult.     Myles Meneses MD  [facility]

## 2023-01-09 NOTE — ASSESSMENT & PLAN NOTE
->UA/UC from 01/06 showed staph aureus, sensitive to oxacillin and bacterium  ->repeat UA and UC from 01/08 pending,   ->s/p clindamycin in ED.   ->start oxacillin or augmentin for a total 5-7 day course

## 2023-01-09 NOTE — ED NOTES
Clot noted in catheter tubing.  Attempted to flush catheter with 40 ml NS.  Cannot get anything back. Dr. Rand notified.

## 2023-01-09 NOTE — SUBJECTIVE & OBJECTIVE
Past Medical History:   Diagnosis Date    Anticoagulant long-term use     Anxiety     Arthritis     Bilateral lower extremity edema     severe chronic    Carotid artery occlusion     Cataract     CHF (congestive heart failure)     Coronary artery disease     subtotalled LAD with collateral    Depression     Fever blister     Hard of hearing     Hypothyroid     Iron deficiency anemia     Lumbar radiculopathy     with chronic pain    Ocular migraine     Renal disorder     Sleep apnea     cpap       Past Surgical History:   Procedure Laterality Date    ADENOIDECTOMY      BACK SURGERY      x 12    CARDIAC CATHETERIZATION  2016    subtotalled LAD with right to left collaterals    CATARACT EXTRACTION W/  INTRAOCULAR LENS IMPLANT Left     Dr Coleman     CYSTOSCOPIC LITHOLAPAXY N/A 6/27/2019    Procedure: CYSTOLITHOLAPAXY;  Surgeon: Shireen Mayo MD;  Location: [facility] OR 2ND FLR;  Service: Urology;  Laterality: N/A;    CYSTOSCOPIC LITHOLAPAXY N/A 9/3/2019    Procedure: CYSTOLITHOLAPAXY;  Surgeon: Shireen Mayo MD;  Location: [facility] OR 2ND FLR;  Service: Urology;  Laterality: N/A;    CYSTOSCOPY N/A 7/13/2021    Procedure: CYSTOSCOPY;  Surgeon: Shireen Mayo MD;  Location: [facility] OR [address] [place] [address];  Service: Urology;  Laterality: N/A;    CYSTOSCOPY  11/16/2021    Procedure: CYSTOSCOPY;  Surgeon: Shireen Mayo MD;  [facility];  Service: Urology;;    CYSTOSCOPY  7/19/2022    Procedure: CYSTOSCOPY;  Surgeon: Shireen Mayo MD;  Location: [facility] OR [address] [place] [address];  Service: Urology;;    CYSTOSCOPY WITH INJECTION OF PERIURETHRAL BULKING AGENT  7/19/2022    Procedure: CYSTOSCOPY, WITH PERIURETHRAL BULKING AGENT INJECTION-MACROPLASTIQUE;  Surgeon: Shireen Mayo MD;  [facility];  Service: Urology;;    CYSTOSCOPY,WITH BOTULINUM TOXIN INJECTION N/A 12/13/2022    Procedure: CYSTOSCOPY,WITH BOTULINUM TOXIN INJECTION;  Surgeon: Shireen Mayo MD;  Location: [facility] OR [address] [place] [address];  Service: Urology;  Laterality:  N/A;  300 U    ESOPHAGOGASTRODUODENOSCOPY N/A 5/23/2018    Procedure: ESOPHAGOGASTRODUODENOSCOPY (EGD);  Surgeon: Prince Vance MD;  Location: [facility] (4TH FLR);  Service: Endoscopy;  Laterality: N/A;  r/s 'd per Dr. Vance due to family emergency- ER    HYSTERECTOMY  1975    endometriosis    INJECTION OF BOTULINUM TOXIN TYPE A  7/13/2021    Procedure: INJECTION, BOTULINUM TOXIN, 200units;  Surgeon: Shireen Mayo MD;  Location: [facility] OR 1ST FLR;  Service: Urology;;    INJECTION OF BOTULINUM TOXIN TYPE A  11/16/2021    Procedure: INJECTION, BOTULINUM TOXIN, 200units;  Surgeon: Shireen Mayo MD;  [facility];  Service: Urology;;    INJECTION OF BOTULINUM TOXIN TYPE A  7/19/2022    Procedure: INJECTION, BOTULINUM TOXIN, 300 units ;  Surgeon: Shireen Mayo MD;  [facility];  Service: Urology;;    INSERTION, SUPRAPUBIC CATHETER N/A 12/13/2022    Procedure: INSERTION, SUPRAPUBIC CATHETER;  Surgeon: Shireen Mayo MD;  [facility];  Service: Urology;  Laterality: N/A;  exchange    pain pump placement      SQ Dilaudid Pump managed by Dr. Hillman, Pain Management    REMOVAL OF BONE SPUR OF FOOT Bilateral 9/16/2022    Procedure: EXCISION ARTHRITIC BONE, BILATERAL FOOT;  Surgeon: NICOLA Mcgrath;  Location: [facility] OR;  Service: Podiatry;  Laterality: Bilateral;    REPLACEMENT OF CATHETER N/A 10/31/2019    Procedure: REPLACEMENT, CATHETER-SUPRAPUBIC;  Surgeon: Shireen Mayo MD;  [facility];  Service: Urology;  Laterality: N/A;    SPINAL CORD STIMULATOR REMOVAL      before Larissa    SPINE SURGERY  5-13-13    CERVICAL FUSION    TONSILLECTOMY         Review of patient's allergies indicates:   Allergen Reactions    (d)-limonene flavor      Other reaction(s): difficult intubation  Other reaction(s): Difficulty breathing    Bactrim [sulfamethoxazole-trimethoprim] Anaphylaxis    Benadryl [diphenhydramine hcl] Shortness Of Breath    Fentanyl  Itching, Nausea And Vomiting and Swelling             Imitrex [sumatriptan succinate] Shortness Of Breath    Percocet [oxycodone-acetaminophen] Shortness Of Breath    Topamax [topiramate] Shortness Of Breath    Vancomycin Shortness Of Breath     Rash    Butorphanol tartrate     Darvocet a500 [propoxyphene n-acetaminophen]      Other reaction(s): Difficulty breathing    Evening primrose (oenothera biennis)     White petrolatum-zinc     Zinc oxide-white petrolatum      Other reaction(s): Difficulty breathing    Latex, natural rubber Itching and Rash    Phenytoin Rash and Other (See Comments)     Trouble breathing       No current facility-administered medications on file prior to encounter.     Current Outpatient Medications on File Prior to Encounter   Medication Sig    aspirin (ECOTRIN) 81 MG EC tablet Take 1 tablet (81 mg total) by mouth once daily.    atorvastatin (LIPITOR) 80 MG tablet TAKE ONE TABLET BY MOUTH EVERY DAY    butalbital-acetaminophen-caffeine -40 mg (FIORICET, ESGIC) -40 mg per tablet Take 1 tablet by mouth daily as needed.    FLUoxetine 20 MG capsule Take 3 capsules (60 mg total) by mouth once daily.    furosemide (LASIX) 40 MG tablet Take 1 tablet (40 mg total) by mouth once daily.    HYDROcodone-acetaminophen (NORCO)  mg per tablet Take 1 tablet by mouth every 6 (six) hours as needed for Pain.    intrathecal pain pump compound Hydromorphone (7.5 mg/mL) infusion at 3.6799 mg/day (0.1533 mg/hr) out of a total reservoir volume of 37.3 mL  Pump filled every 2 months    levothyroxine (SYNTHROID) 137 MCG Tab tablet Take 1 tablet (137 mcg total) by mouth before breakfast.    nitroGLYCERIN (NITROSTAT) 0.4 MG SL tablet Place 1 tablet (0.4 mg total) under the tongue every 5 (five) minutes as needed for Chest pain.    pantoprazole (PROTONIX) 40 MG tablet Take 1 tablet (40 mg total) by mouth once daily.    potassium chloride SA (K-DUR,KLOR-CON) 20 MEQ tablet Take 1 tablet (20 mEq total) by  mouth 2 (two) times daily.    promethazine (PHENERGAN) 25 MG tablet Take 25 mg by mouth every 6 (six) hours as needed for Nausea.    QUEtiapine (SEROQUEL) 100 MG Tab TAKE 2 TABLETS (200 MG) BY MOUTH NIGHTLY    QUEtiapine (SEROQUEL) 25 MG Tab Take 12.5-25 mg twice daily as needed for anxiety    senna (SENNA LAX) 8.6 mg tablet Take 2 tablets by mouth 2 (two) times daily.    tiZANidine (ZANAFLEX) 4 MG tablet Take 4 mg by mouth 2 (two) times daily.    traZODone (DESYREL) 300 MG tablet Take 1 tablet (300 mg total) by mouth every evening.     Family History       Problem Relation (Age of Onset)    Cancer Mother (55), Father    Cirrhosis Paternal Aunt, Paternal Uncle    Colon cancer Maternal Uncle    Esophageal cancer Father    Heart disease Maternal Uncle    Liver disease Paternal Aunt, Paternal Uncle    No Known Problems Brother, Brother, Sister, Maternal Aunt, Maternal Grandfather, Paternal Grandmother, Paternal Grandfather    Parkinsonism Maternal Grandmother    Tremor Maternal Grandmother          Tobacco Use    Smoking status: Never    Smokeless tobacco: Never   Substance and Sexual Activity    Alcohol use: Never    Drug use: No    Sexual activity: Never     Partners: Male     Review of Systems   Constitutional:  Negative for appetite change and chills.        Looks chronically ill   HENT:  Negative for drooling and ear discharge.    Eyes:  Negative for pain and redness.   Respiratory:  Negative for cough and shortness of breath.    Cardiovascular:  Negative for palpitations and leg swelling.   Gastrointestinal:  Positive for abdominal pain.   Endocrine: Negative for polydipsia and polyphagia.   Genitourinary:  Positive for dysuria and hematuria.   Musculoskeletal:  Negative for joint swelling and myalgias.   Skin:  Negative for pallor and rash.   Allergic/Immunologic: Negative for food allergies and immunocompromised state.   Neurological:  Negative for seizures and numbness.   Hematological:  Negative for  adenopathy. Does not bruise/bleed easily.   Psychiatric/Behavioral:  The patient is not hyperactive.    Objective:     Vital Signs (Most Recent):  Temp: 98.6 °F (37 °C) (01/09/23 0025)  Pulse: (!) 51 (01/09/23 0417)  Resp: 18 (01/09/23 0417)  BP: (!) 91/46 (01/09/23 0417)  SpO2: (!) 92 % (01/09/23 0417)   Vital Signs (24h Range):  Temp:  [98.6 °F (37 °C)-99 °F (37.2 °C)] 98.6 °F (37 °C)  Pulse:  [51-68] 51  Resp:  [16-18] 18  SpO2:  [92 %-97 %] 92 %  BP: ()/(46-54) 91/46     Weight: 86.6 kg (191 lb)  Body mass index is 29.91 kg/m².    Physical Exam  Constitutional:       General: She is not in acute distress.     Appearance: She is obese.   HENT:      Head: Atraumatic.      Right Ear: There is no impacted cerumen.      Left Ear: There is no impacted cerumen.      Nose: No congestion or rhinorrhea.      Mouth/Throat:      Pharynx: No oropharyngeal exudate or posterior oropharyngeal erythema.   Eyes:      General:         Right eye: No discharge.         Left eye: No discharge.   Cardiovascular:      Rate and Rhythm: Bradycardia present.      Heart sounds: No murmur heard.    No gallop.   Pulmonary:      Breath sounds: No wheezing or rales.   Abdominal:      Tenderness: There is no abdominal tenderness. There is no guarding.   Genitourinary:     Comments: Suprapubic catether in place, with some dark-casa red urine noted  Musculoskeletal:         General: No swelling or deformity.   Skin:     Coloration: Skin is not jaundiced.      Findings: No bruising.   Neurological:      Coordination: Coordination normal.      Deep Tendon Reflexes: Reflexes normal.   Psychiatric:         Mood and Affect: Mood normal.         Behavior: Behavior normal.           Significant Labs: All pertinent labs within the past 24 hours have been reviewed.  A1C:   Recent Labs   Lab 07/28/22  0326   HGBA1C 5.9*     ABGs: No results for input(s): PH, PCO2, HCO3, POCSATURATED, BE, TOTALHB, COHB, METHB, O2HB, POCFIO2, PO2 in the last 48  hours.  Bilirubin:   Recent Labs   Lab 01/08/23  2347   BILITOT 0.6     Blood Culture: No results for input(s): LABBLOO in the last 48 hours.  BMP:   Recent Labs   Lab 01/09/23  0443   GLU 96   *   K 3.2*   CL 98   CO2 29   BUN 13   CREATININE 0.9   CALCIUM 8.7     CBC:   Recent Labs   Lab 01/08/23  2348 01/09/23  0443   WBC 7.03 5.91   HGB 9.2* 8.4*   HCT 30.2* 27.5*    167     CMP:   Recent Labs   Lab 01/08/23  2347 01/09/23  0443    135*   K 3.2* 3.2*   CL 95 98   CO2 31* 29   GLU 90 96   BUN 12 13   CREATININE 1.1 0.9   CALCIUM 9.3 8.7   PROT 7.6  --    ALBUMIN 3.4*  --    BILITOT 0.6  --    ALKPHOS 105  --    AST 10  --    ALT 5*  --    ANIONGAP 11 8     Lipid Panel: No results for input(s): CHOL, HDL, LDLCALC, TRIG, CHOLHDL in the last 48 hours.  Troponin: No results for input(s): TROPONINI, TROPONINIHS in the last 48 hours.  TSH:   Recent Labs   Lab 07/22/22  1837   TSH 11.054*     Urine Studies:   Recent Labs   Lab 01/08/23  2212   COLORU Orange*   APPEARANCEUA Cloudy*   PHUR 6.0   SPECGRAV 1.015   PROTEINUA 3+*   GLUCUA Negative   KETONESU Negative   BILIRUBINUA Negative   OCCULTUA 3+*   NITRITE Negative   UROBILINOGEN Negative   LEUKOCYTESUR 3+*   RBCUA >100*   WBCUA 20*   BACTERIA Moderate*   HYALINECASTS 0       Significant Imaging: I have reviewed all pertinent imaging results/findings within the past 24 hours.  I have reviewed and interpreted all pertinent imaging results/findings within the past 24 hours.

## 2023-01-10 PROBLEM — E87.6 HYPOKALEMIA: Status: RESOLVED | Noted: 2023-01-09 | Resolved: 2023-01-10

## 2023-01-10 PROBLEM — E87.1 HYPONATREMIA: Status: RESOLVED | Noted: 2022-02-04 | Resolved: 2023-01-10

## 2023-01-10 LAB
ANION GAP SERPL CALC-SCNC: 6 MMOL/L (ref 8–16)
AV INDEX (PROSTH): 0.59
AV MEAN GRADIENT: 5 MMHG
AV PEAK GRADIENT: 8 MMHG
AV VALVE AREA: 1.86 CM2
AV VELOCITY RATIO: 0.58
BACTERIA #/AREA URNS HPF: ABNORMAL /HPF
BASOPHILS # BLD AUTO: 0.01 K/UL (ref 0–0.2)
BASOPHILS NFR BLD: 0.2 % (ref 0–1.9)
BILIRUB UR QL STRIP: NEGATIVE
BSA FOR ECHO PROCEDURE: 2.01 M2
BUN SERPL-MCNC: 10 MG/DL (ref 8–23)
CALCIUM SERPL-MCNC: 8.5 MG/DL (ref 8.7–10.5)
CHLORIDE SERPL-SCNC: 104 MMOL/L (ref 95–110)
CLARITY UR: ABNORMAL
CO2 SERPL-SCNC: 29 MMOL/L (ref 23–29)
COLOR UR: YELLOW
CREAT SERPL-MCNC: 0.9 MG/DL (ref 0.5–1.4)
CV ECHO LV RWT: 0.53 CM
DIFFERENTIAL METHOD: ABNORMAL
DOP CALC AO PEAK VEL: 1.45 M/S
DOP CALC AO VTI: 37.6 CM
DOP CALC LVOT AREA: 3.2 CM2
DOP CALC LVOT DIAMETER: 2.01 CM
DOP CALC LVOT PEAK VEL: 0.84 M/S
DOP CALC LVOT STROKE VOLUME: 70.09 CM3
DOP CALC MV VTI: 34.2 CM
DOP CALCLVOT PEAK VEL VTI: 22.1 CM
E WAVE DECELERATION TIME: 212.32 MSEC
E/A RATIO: 1.48
E/E' RATIO: 10 M/S
ECHO LV POSTERIOR WALL: 0.97 CM (ref 0.6–1.1)
EJECTION FRACTION: 55 %
EOSINOPHIL # BLD AUTO: 0.3 K/UL (ref 0–0.5)
EOSINOPHIL NFR BLD: 5.3 % (ref 0–8)
ERYTHROCYTE [DISTWIDTH] IN BLOOD BY AUTOMATED COUNT: 15.7 % (ref 11.5–14.5)
EST. GFR  (NO RACE VARIABLE): >60 ML/MIN/1.73 M^2
FRACTIONAL SHORTENING: 26 % (ref 28–44)
GLUCOSE SERPL-MCNC: 93 MG/DL (ref 70–110)
GLUCOSE UR QL STRIP: NEGATIVE
HCT VFR BLD AUTO: 28.5 % (ref 37–48.5)
HGB BLD-MCNC: 8.5 G/DL (ref 12–16)
HGB UR QL STRIP: ABNORMAL
HYALINE CASTS #/AREA URNS LPF: 0 /LPF
IMM GRANULOCYTES # BLD AUTO: 0.01 K/UL (ref 0–0.04)
IMM GRANULOCYTES NFR BLD AUTO: 0.2 % (ref 0–0.5)
INTERVENTRICULAR SEPTUM: 1.25 CM (ref 0.6–1.1)
IVC DIAMETER: 2.36 CM
KETONES UR QL STRIP: NEGATIVE
LA MAJOR: 6.08 CM
LA MINOR: 4.94 CM
LA WIDTH: 5 CM
LACTATE SERPL-SCNC: 0.6 MMOL/L (ref 0.5–2.2)
LEFT ATRIUM SIZE: 3.17 CM
LEFT ATRIUM VOLUME INDEX MOD: 35.3 ML/M2
LEFT ATRIUM VOLUME INDEX: 37.3 ML/M2
LEFT ATRIUM VOLUME MOD: 69.49 CM3
LEFT ATRIUM VOLUME: 73.44 CM3
LEFT INTERNAL DIMENSION IN SYSTOLE: 2.69 CM (ref 2.1–4)
LEFT VENTRICLE DIASTOLIC VOLUME INDEX: 28.11 ML/M2
LEFT VENTRICLE DIASTOLIC VOLUME: 55.37 ML
LEFT VENTRICLE MASS INDEX: 65 G/M2
LEFT VENTRICLE SYSTOLIC VOLUME INDEX: 13.6 ML/M2
LEFT VENTRICLE SYSTOLIC VOLUME: 26.8 ML
LEFT VENTRICULAR INTERNAL DIMENSION IN DIASTOLE: 3.63 CM (ref 3.5–6)
LEFT VENTRICULAR MASS: 127.37 G
LEUKOCYTE ESTERASE UR QL STRIP: ABNORMAL
LV LATERAL E/E' RATIO: 9.55 M/S
LV SEPTAL E/E' RATIO: 10.5 M/S
LVOT MG: 1.24 MMHG
LVOT MV: 0.5 CM/S
LYMPHOCYTES # BLD AUTO: 0.9 K/UL (ref 1–4.8)
LYMPHOCYTES NFR BLD: 16.8 % (ref 18–48)
MCH RBC QN AUTO: 24.4 PG (ref 27–31)
MCHC RBC AUTO-ENTMCNC: 29.8 G/DL (ref 32–36)
MCV RBC AUTO: 82 FL (ref 82–98)
MICROSCOPIC COMMENT: ABNORMAL
MONOCYTES # BLD AUTO: 0.5 K/UL (ref 0.3–1)
MONOCYTES NFR BLD: 9.8 % (ref 4–15)
MV A" WAVE DURATION": 148.43 MSEC
MV MEAN GRADIENT: 1 MMHG
MV PEAK A VEL: 0.71 M/S
MV PEAK E VEL: 1.05 M/S
MV PEAK GRADIENT: 4 MMHG
MV STENOSIS PRESSURE HALF TIME: 61.57 MS
MV VALVE AREA BY CONTINUITY EQUATION: 2.05 CM2
MV VALVE AREA P 1/2 METHOD: 3.57 CM2
NEUTROPHILS # BLD AUTO: 3.6 K/UL (ref 1.8–7.7)
NEUTROPHILS NFR BLD: 67.7 % (ref 38–73)
NITRITE UR QL STRIP: NEGATIVE
NRBC BLD-RTO: 0 /100 WBC
PH UR STRIP: 7 [PH] (ref 5–8)
PISA TR MAX VEL: 2.58 M/S
PLATELET # BLD AUTO: 174 K/UL (ref 150–450)
PMV BLD AUTO: 10 FL (ref 9.2–12.9)
POTASSIUM SERPL-SCNC: 4.4 MMOL/L (ref 3.5–5.1)
PROT UR QL STRIP: ABNORMAL
PV MV: 0.74 M/S
PV PEAK VELOCITY: 0.92 CM/S
RA MAJOR: 5.18 CM
RA PRESSURE: 8 MMHG
RA WIDTH: 4.61 CM
RBC # BLD AUTO: 3.48 M/UL (ref 4–5.4)
RBC #/AREA URNS HPF: 41 /HPF (ref 0–4)
RIGHT VENTRICULAR END-DIASTOLIC DIMENSION: 2.64 CM
SODIUM SERPL-SCNC: 139 MMOL/L (ref 136–145)
SP GR UR STRIP: 1.02 (ref 1–1.03)
T4 FREE SERPL-MCNC: 0.75 NG/DL (ref 0.71–1.51)
TDI LATERAL: 0.11 M/S
TDI SEPTAL: 0.1 M/S
TDI: 0.11 M/S
TR MAX PG: 27 MMHG
TROPONIN I SERPL DL<=0.01 NG/ML-MCNC: 0.01 NG/ML (ref 0–0.03)
TSH SERPL DL<=0.005 MIU/L-ACNC: 51.87 UIU/ML (ref 0.4–4)
TV REST PULMONARY ARTERY PRESSURE: 35 MMHG
URN SPEC COLLECT METH UR: ABNORMAL
UROBILINOGEN UR STRIP-ACNC: NEGATIVE EU/DL
WBC # BLD AUTO: 5.29 K/UL (ref 3.9–12.7)
WBC #/AREA URNS HPF: >100 /HPF (ref 0–5)
WBC CLUMPS URNS QL MICRO: ABNORMAL

## 2023-01-10 PROCEDURE — 99222 1ST HOSP IP/OBS MODERATE 55: CPT | Mod: HCNC,,, | Performed by: NURSE PRACTITIONER

## 2023-01-10 PROCEDURE — 83605 ASSAY OF LACTIC ACID: CPT | Mod: HCNC | Performed by: NURSE PRACTITIONER

## 2023-01-10 PROCEDURE — 25000003 PHARM REV CODE 250: Mod: HCNC | Performed by: FAMILY MEDICINE

## 2023-01-10 PROCEDURE — 25000003 PHARM REV CODE 250: Mod: HCNC | Performed by: NURSE PRACTITIONER

## 2023-01-10 PROCEDURE — 20000000 HC ICU ROOM: Mod: HCNC

## 2023-01-10 PROCEDURE — 87077 CULTURE AEROBIC IDENTIFY: CPT | Mod: HCNC | Performed by: NURSE PRACTITIONER

## 2023-01-10 PROCEDURE — 63600175 PHARM REV CODE 636 W HCPCS: Mod: HCNC | Performed by: STUDENT IN AN ORGANIZED HEALTH CARE EDUCATION/TRAINING PROGRAM

## 2023-01-10 PROCEDURE — 85025 COMPLETE CBC W/AUTO DIFF WBC: CPT | Mod: HCNC | Performed by: STUDENT IN AN ORGANIZED HEALTH CARE EDUCATION/TRAINING PROGRAM

## 2023-01-10 PROCEDURE — 99223 PR INITIAL HOSPITAL CARE,LEVL III: ICD-10-PCS | Mod: HCNC,,, | Performed by: STUDENT IN AN ORGANIZED HEALTH CARE EDUCATION/TRAINING PROGRAM

## 2023-01-10 PROCEDURE — 87186 SC STD MICRODIL/AGAR DIL: CPT | Mod: HCNC | Performed by: NURSE PRACTITIONER

## 2023-01-10 PROCEDURE — 97530 THERAPEUTIC ACTIVITIES: CPT | Mod: HCNC,CQ

## 2023-01-10 PROCEDURE — 25000003 PHARM REV CODE 250: Mod: HCNC | Performed by: STUDENT IN AN ORGANIZED HEALTH CARE EDUCATION/TRAINING PROGRAM

## 2023-01-10 PROCEDURE — 99222 PR INITIAL HOSPITAL CARE,LEVL II: ICD-10-PCS | Mod: HCNC,,, | Performed by: NURSE PRACTITIONER

## 2023-01-10 PROCEDURE — 93010 ELECTROCARDIOGRAM REPORT: CPT | Mod: HCNC,,, | Performed by: INTERNAL MEDICINE

## 2023-01-10 PROCEDURE — 94761 N-INVAS EAR/PLS OXIMETRY MLT: CPT | Mod: HCNC

## 2023-01-10 PROCEDURE — 80048 BASIC METABOLIC PNL TOTAL CA: CPT | Mod: HCNC | Performed by: STUDENT IN AN ORGANIZED HEALTH CARE EDUCATION/TRAINING PROGRAM

## 2023-01-10 PROCEDURE — 84484 ASSAY OF TROPONIN QUANT: CPT | Mod: HCNC | Performed by: NURSE PRACTITIONER

## 2023-01-10 PROCEDURE — 93010 EKG 12-LEAD: ICD-10-PCS | Mod: HCNC,76,, | Performed by: INTERNAL MEDICINE

## 2023-01-10 PROCEDURE — 93010 ELECTROCARDIOGRAM REPORT: CPT | Mod: HCNC,76,, | Performed by: INTERNAL MEDICINE

## 2023-01-10 PROCEDURE — 84439 ASSAY OF FREE THYROXINE: CPT | Mod: HCNC | Performed by: NURSE PRACTITIONER

## 2023-01-10 PROCEDURE — 99900035 HC TECH TIME PER 15 MIN (STAT): Mod: HCNC

## 2023-01-10 PROCEDURE — 36415 COLL VENOUS BLD VENIPUNCTURE: CPT | Mod: HCNC | Performed by: NURSE PRACTITIONER

## 2023-01-10 PROCEDURE — 87086 URINE CULTURE/COLONY COUNT: CPT | Mod: HCNC | Performed by: NURSE PRACTITIONER

## 2023-01-10 PROCEDURE — 87040 BLOOD CULTURE FOR BACTERIA: CPT | Mod: 59,HCNC | Performed by: NURSE PRACTITIONER

## 2023-01-10 PROCEDURE — 84443 ASSAY THYROID STIM HORMONE: CPT | Mod: HCNC | Performed by: NURSE PRACTITIONER

## 2023-01-10 PROCEDURE — 81000 URINALYSIS NONAUTO W/SCOPE: CPT | Mod: HCNC | Performed by: NURSE PRACTITIONER

## 2023-01-10 PROCEDURE — 63600175 PHARM REV CODE 636 W HCPCS: Mod: HCNC | Performed by: NURSE PRACTITIONER

## 2023-01-10 PROCEDURE — 99223 1ST HOSP IP/OBS HIGH 75: CPT | Mod: HCNC,,, | Performed by: STUDENT IN AN ORGANIZED HEALTH CARE EDUCATION/TRAINING PROGRAM

## 2023-01-10 PROCEDURE — 97110 THERAPEUTIC EXERCISES: CPT | Mod: HCNC,CQ

## 2023-01-10 PROCEDURE — 27000221 HC OXYGEN, UP TO 24 HOURS: Mod: HCNC

## 2023-01-10 PROCEDURE — 93005 ELECTROCARDIOGRAM TRACING: CPT | Mod: HCNC

## 2023-01-10 PROCEDURE — 87088 URINE BACTERIA CULTURE: CPT | Mod: HCNC | Performed by: NURSE PRACTITIONER

## 2023-01-10 PROCEDURE — 36415 COLL VENOUS BLD VENIPUNCTURE: CPT | Mod: HCNC | Performed by: STUDENT IN AN ORGANIZED HEALTH CARE EDUCATION/TRAINING PROGRAM

## 2023-01-10 RX ORDER — FUROSEMIDE 10 MG/ML
40 INJECTION INTRAMUSCULAR; INTRAVENOUS ONCE
Status: COMPLETED | OUTPATIENT
Start: 2023-01-10 | End: 2023-01-10

## 2023-01-10 RX ORDER — MUPIROCIN 20 MG/G
OINTMENT TOPICAL 2 TIMES DAILY
Status: COMPLETED | OUTPATIENT
Start: 2023-01-10 | End: 2023-01-15

## 2023-01-10 RX ORDER — SODIUM CHLORIDE 9 MG/ML
INJECTION, SOLUTION INTRAVENOUS
Status: DISCONTINUED | OUTPATIENT
Start: 2023-01-10 | End: 2023-01-19 | Stop reason: HOSPADM

## 2023-01-10 RX ORDER — LINEZOLID 2 MG/ML
600 INJECTION, SOLUTION INTRAVENOUS
Status: DISCONTINUED | OUTPATIENT
Start: 2023-01-10 | End: 2023-01-11

## 2023-01-10 RX ORDER — NOREPINEPHRINE BITARTRATE/D5W 4MG/250ML
0-3 PLASTIC BAG, INJECTION (ML) INTRAVENOUS CONTINUOUS
Status: DISCONTINUED | OUTPATIENT
Start: 2023-01-10 | End: 2023-01-11

## 2023-01-10 RX ORDER — HYDROMORPHONE HCL IN 0.9% NACL 6 MG/30 ML
PATIENT CONTROLLED ANALGESIA SYRINGE INTRAVENOUS CONTINUOUS
Status: DISCONTINUED | OUTPATIENT
Start: 2023-01-10 | End: 2023-01-10

## 2023-01-10 RX ADMIN — HYDROCODONE BITARTRATE AND ACETAMINOPHEN 1 TABLET: 10; 325 TABLET ORAL at 04:01

## 2023-01-10 RX ADMIN — SODIUM CHLORIDE: 0.9 INJECTION, SOLUTION INTRAVENOUS at 04:01

## 2023-01-10 RX ADMIN — DOCUSATE SODIUM - SENNOSIDES 1 TABLET: 50; 8.6 TABLET, FILM COATED ORAL at 09:01

## 2023-01-10 RX ADMIN — ASPIRIN 81 MG: 81 TABLET, COATED ORAL at 10:01

## 2023-01-10 RX ADMIN — QUETIAPINE FUMARATE 25 MG: 25 TABLET ORAL at 09:01

## 2023-01-10 RX ADMIN — FLUOXETINE 60 MG: 20 CAPSULE ORAL at 10:01

## 2023-01-10 RX ADMIN — DOCUSATE SODIUM - SENNOSIDES 1 TABLET: 50; 8.6 TABLET, FILM COATED ORAL at 10:01

## 2023-01-10 RX ADMIN — Medication 0.02 MCG/KG/MIN: at 04:01

## 2023-01-10 RX ADMIN — MUPIROCIN: 20 OINTMENT TOPICAL at 09:01

## 2023-01-10 RX ADMIN — PANTOPRAZOLE SODIUM 40 MG: 40 TABLET, DELAYED RELEASE ORAL at 10:01

## 2023-01-10 RX ADMIN — Medication: at 05:01

## 2023-01-10 RX ADMIN — LEVOTHYROXINE SODIUM 137 MCG: 25 TABLET ORAL at 06:01

## 2023-01-10 RX ADMIN — FUROSEMIDE 40 MG: 10 INJECTION, SOLUTION INTRAMUSCULAR; INTRAVENOUS at 04:01

## 2023-01-10 RX ADMIN — TIZANIDINE 4 MG: 4 TABLET ORAL at 10:01

## 2023-01-10 RX ADMIN — CEFAZOLIN SODIUM 1 G: 1 SOLUTION INTRAVENOUS at 06:01

## 2023-01-10 RX ADMIN — POTASSIUM CHLORIDE 20 MEQ: 1500 TABLET, EXTENDED RELEASE ORAL at 09:01

## 2023-01-10 RX ADMIN — POTASSIUM CHLORIDE 20 MEQ: 1500 TABLET, EXTENDED RELEASE ORAL at 10:01

## 2023-01-10 RX ADMIN — SODIUM CHLORIDE: 9 INJECTION, SOLUTION INTRAVENOUS at 05:01

## 2023-01-10 RX ADMIN — LINEZOLID 600 MG: 600 INJECTION, SOLUTION INTRAVENOUS at 04:01

## 2023-01-10 RX ADMIN — ATORVASTATIN CALCIUM 80 MG: 40 TABLET, FILM COATED ORAL at 10:01

## 2023-01-10 RX ADMIN — ENOXAPARIN SODIUM 40 MG: 40 INJECTION SUBCUTANEOUS at 04:01

## 2023-01-10 RX ADMIN — PIPERACILLIN AND TAZOBACTAM 4.5 G: 4; .5 INJECTION, POWDER, LYOPHILIZED, FOR SOLUTION INTRAVENOUS; PARENTERAL at 06:01

## 2023-01-10 RX ADMIN — TRAZODONE HYDROCHLORIDE 300 MG: 100 TABLET ORAL at 09:01

## 2023-01-10 NOTE — TELEPHONE ENCOUNTER
Patient was admitted to [facility] and they are aware that there was a pending culture.  She has Staph that is resistant to PCN and sensitive to oxacillin and Bactrim but the patient is reportedly allergic to Bactrim having anaphylaxis. Will defer to her team at [facility].

## 2023-01-10 NOTE — PLAN OF CARE
Problem: Physical Therapy  Goal: Physical Therapy Goal  Description: Goals to be met by: 23     Patient will increase functional independence with mobility by performin. Supine to sit with Stand-by Assistance  2. Sit to supine with CGA  3. Sit to stand transfer with Stand-by Assistance  4. Bed to chair transfer with Stand-by Assistance using Rolling Walker  5. Gait  x 50 feet with Stand-by Assistance using Rolling Walker.     Outcome: Ongoing, Progressing

## 2023-01-10 NOTE — NURSING TRANSFER
Nursing Transfer Note      1/10/2023     Reason patient is being transferred: Bradycardia, Hypotension, UTI; starting PCA Dilaudid infusion and norepinephrine infusion     Transfer To: ICU     Transfer via bed    Transfer with cardiac monitoring, O2, and patient belongings     Transported by 2 RNs     Medicines sent: [employer]     Any special needs or follow-up needed: N/A    Chart send with patient: Yes    Notified: spouse, daughter    Patient reassessed at: To be assessed by ICU nurse (date, time)    Upon arrival to floor: cardiac monitor applied, patient oriented to room, call bell in reach, and bed in lowest position

## 2023-01-10 NOTE — ASSESSMENT & PLAN NOTE
· BP 81-93/42-55 over last 24 hours  · S/p 1.5L total NS bolus   · Lasix o/h   · Given this and setting of CHF, do not want to overload w/ IVF  · Breakthrough pain meds stopped  · Likely etiology intrathecal dilaudid pump vs UTI sepsis

## 2023-01-10 NOTE — HPI
66yo female with chronic meadows- suprapubic catheter, chronic diastolic heart failure, UTI, chest pain, CAD known LAD occlusion with R-L collaterals, sleep apnea, bradycardia, hypokalemic and hyponatremia who presented to the ER with blood in urine. She complains of decreased urine output to catheter bag with notation of rocío blood therefore she presented to the ER for evaluation. She reported feeling poorly last week ie fatigue but was uncertain why. She was seen by Dr. Ceballos in August 2022 for pre operative clearance. She reports chest pain with exertion for the past few months but does not feel it is worsening. She lives at home and uses a walker and wheelchair to get around. She reports it can ease with rest. She denies any dizziness, palpitations or syncope. EKG SB with no acute STTWC H&H 8.5&28.5 BMP WNL. Cardiology consulted due to concern for bradycardia

## 2023-01-10 NOTE — SUBJECTIVE & OBJECTIVE
Past Medical History:   Diagnosis Date    Anticoagulant long-term use     Anxiety     Arthritis     Bilateral lower extremity edema     severe chronic    Carotid artery occlusion     Cataract     CHF (congestive heart failure)     Coronary artery disease     subtotalled LAD with collateral    Depression     Fever blister     Hard of hearing     Hypothyroid     Iron deficiency anemia     Lumbar radiculopathy     with chronic pain    Ocular migraine     Renal disorder     Sleep apnea     cpap       Past Surgical History:   Procedure Laterality Date    ADENOIDECTOMY      BACK SURGERY      x 12    CARDIAC CATHETERIZATION  2016    subtotalled LAD with right to left collaterals    CATARACT EXTRACTION W/  INTRAOCULAR LENS IMPLANT Left     Dr Coleman     CYSTOSCOPIC LITHOLAPAXY N/A 6/27/2019    Procedure: CYSTOLITHOLAPAXY;  Surgeon: Shireen Mayo MD;  Location: [facility] OR 2ND FLR;  Service: Urology;  Laterality: N/A;    CYSTOSCOPIC LITHOLAPAXY N/A 9/3/2019    Procedure: CYSTOLITHOLAPAXY;  Surgeon: Shireen Mayo MD;  Location: [facility] OR 2ND FLR;  Service: Urology;  Laterality: N/A;    CYSTOSCOPY N/A 7/13/2021    Procedure: CYSTOSCOPY;  Surgeon: Shireen Mayo MD;  Location: [facility] OR [address] [place] [address];  Service: Urology;  Laterality: N/A;    CYSTOSCOPY  11/16/2021    Procedure: CYSTOSCOPY;  Surgeon: Shireen Mayo MD;  [facility];  Service: Urology;;    CYSTOSCOPY  7/19/2022    Procedure: CYSTOSCOPY;  Surgeon: Shireen Mayo MD;  Location: [facility] OR [address] [place] [address];  Service: Urology;;    CYSTOSCOPY WITH INJECTION OF PERIURETHRAL BULKING AGENT  7/19/2022    Procedure: CYSTOSCOPY, WITH PERIURETHRAL BULKING AGENT INJECTION-MACROPLASTIQUE;  Surgeon: Shireen Mayo MD;  [facility];  Service: Urology;;    CYSTOSCOPY,WITH BOTULINUM TOXIN INJECTION N/A 12/13/2022    Procedure: CYSTOSCOPY,WITH BOTULINUM TOXIN INJECTION;  Surgeon: Shireen Mayo MD;  Location: [facility] OR [address] [place] [address];  Service: Urology;  Laterality:  N/A;  300 U    ESOPHAGOGASTRODUODENOSCOPY N/A 5/23/2018    Procedure: ESOPHAGOGASTRODUODENOSCOPY (EGD);  Surgeon: Prince Vance MD;  Location: [facility] (4TH FLR);  Service: Endoscopy;  Laterality: N/A;  r/s 'd per Dr. Vance due to family emergency- ER    HYSTERECTOMY  1975    endometriosis    INJECTION OF BOTULINUM TOXIN TYPE A  7/13/2021    Procedure: INJECTION, BOTULINUM TOXIN, 200units;  Surgeon: Shireen Mayo MD;  Location: [facility] OR 1ST FLR;  Service: Urology;;    INJECTION OF BOTULINUM TOXIN TYPE A  11/16/2021    Procedure: INJECTION, BOTULINUM TOXIN, 200units;  Surgeon: Shireen Mayo MD;  [facility];  Service: Urology;;    INJECTION OF BOTULINUM TOXIN TYPE A  7/19/2022    Procedure: INJECTION, BOTULINUM TOXIN, 300 units ;  Surgeon: Shireen Mayo MD;  [facility];  Service: Urology;;    INSERTION, SUPRAPUBIC CATHETER N/A 12/13/2022    Procedure: INSERTION, SUPRAPUBIC CATHETER;  Surgeon: Shireen Mayo MD;  [facility];  Service: Urology;  Laterality: N/A;  exchange    pain pump placement      SQ Dilaudid Pump managed by Dr. Hillman, Pain Management    REMOVAL OF BONE SPUR OF FOOT Bilateral 9/16/2022    Procedure: EXCISION ARTHRITIC BONE, BILATERAL FOOT;  Surgeon: NICOLA Mcgrath;  Location: [facility] OR;  Service: Podiatry;  Laterality: Bilateral;    REPLACEMENT OF CATHETER N/A 10/31/2019    Procedure: REPLACEMENT, CATHETER-SUPRAPUBIC;  Surgeon: Shireen Mayo MD;  [facility];  Service: Urology;  Laterality: N/A;    SPINAL CORD STIMULATOR REMOVAL      before [place]    SPINE SURGERY  5-13-13    CERVICAL FUSION    TONSILLECTOMY         Review of patient's allergies indicates:   Allergen Reactions    (d)-limonene flavor      Other reaction(s): difficult intubation  Other reaction(s): Difficulty breathing    Bactrim [sulfamethoxazole-trimethoprim] Anaphylaxis    Benadryl [diphenhydramine hcl] Shortness Of Breath    Fentanyl  Itching, Nausea And Vomiting and Swelling             Imitrex [sumatriptan succinate] Shortness Of Breath    Percocet [oxycodone-acetaminophen] Shortness Of Breath    Topamax [topiramate] Shortness Of Breath    Vancomycin Shortness Of Breath     Rash    Butorphanol tartrate     Darvocet a500 [propoxyphene n-acetaminophen]      Other reaction(s): Difficulty breathing    Evening primrose (oenothera biennis)     White petrolatum-zinc     Zinc oxide-white petrolatum      Other reaction(s): Difficulty breathing    Latex, natural rubber Itching and Rash    Phenytoin Rash and Other (See Comments)     Trouble breathing       No current facility-administered medications on file prior to encounter.     Current Outpatient Medications on File Prior to Encounter   Medication Sig    aspirin (ECOTRIN) 81 MG EC tablet Take 1 tablet (81 mg total) by mouth once daily.    atorvastatin (LIPITOR) 80 MG tablet TAKE ONE TABLET BY MOUTH EVERY DAY    butalbital-acetaminophen-caffeine -40 mg (FIORICET, ESGIC) -40 mg per tablet Take 1 tablet by mouth daily as needed.    FLUoxetine 20 MG capsule Take 3 capsules (60 mg total) by mouth once daily.    furosemide (LASIX) 40 MG tablet Take 1 tablet (40 mg total) by mouth once daily.    HYDROcodone-acetaminophen (NORCO)  mg per tablet Take 1 tablet by mouth every 6 (six) hours as needed for Pain.    intrathecal pain pump compound Hydromorphone (7.5 mg/mL) infusion at 3.6799 mg/day (0.1533 mg/hr) out of a total reservoir volume of 37.3 mL  Pump filled every 2 months    levothyroxine (SYNTHROID) 137 MCG Tab tablet Take 1 tablet (137 mcg total) by mouth before breakfast.    pantoprazole (PROTONIX) 40 MG tablet Take 1 tablet (40 mg total) by mouth once daily.    potassium chloride SA (K-DUR,KLOR-CON) 20 MEQ tablet Take 1 tablet (20 mEq total) by mouth 2 (two) times daily.    QUEtiapine (SEROQUEL) 100 MG Tab TAKE 2 TABLETS (200 MG) BY MOUTH NIGHTLY    QUEtiapine (SEROQUEL) 25 MG Tab  Take 12.5-25 mg twice daily as needed for anxiety    senna (SENNA LAX) 8.6 mg tablet Take 2 tablets by mouth 2 (two) times daily.    tiZANidine (ZANAFLEX) 4 MG tablet Take 4 mg by mouth 2 (two) times daily.    traZODone (DESYREL) 300 MG tablet Take 1 tablet (300 mg total) by mouth every evening.    nitroGLYCERIN (NITROSTAT) 0.4 MG SL tablet Place 1 tablet (0.4 mg total) under the tongue every 5 (five) minutes as needed for Chest pain.    promethazine (PHENERGAN) 25 MG tablet Take 25 mg by mouth every 6 (six) hours as needed for Nausea.     Family History       Problem Relation (Age of Onset)    Cancer Mother (55), Father    Cirrhosis Paternal Aunt, Paternal Uncle    Colon cancer Maternal Uncle    Esophageal cancer Father    Heart disease Maternal Uncle    Liver disease Paternal Aunt, Paternal Uncle    No Known Problems Brother, Brother, Sister, Maternal Aunt, Maternal Grandfather, Paternal Grandmother, Paternal Grandfather    Parkinsonism Maternal Grandmother    Tremor Maternal Grandmother          Tobacco Use    Smoking status: Never    Smokeless tobacco: Never   Substance and Sexual Activity    Alcohol use: Never    Drug use: No    Sexual activity: Never     Partners: Male     Review of Systems   Constitutional: Negative for chills, decreased appetite, diaphoresis and fever.   Cardiovascular:  Positive for chest pain. Negative for claudication, cyanosis, dyspnea on exertion, irregular heartbeat, leg swelling, near-syncope, orthopnea, palpitations, paroxysmal nocturnal dyspnea and syncope.   Respiratory:  Negative for cough, hemoptysis, shortness of breath and wheezing.    Gastrointestinal:  Negative for bloating, abdominal pain, constipation, diarrhea, melena, nausea and vomiting.   Genitourinary:  Positive for hematuria.   Neurological:  Negative for dizziness and weakness.   Objective:     Vital Signs (Most Recent):  Temp: 96.7 °F (35.9 °C) (01/10/23 1145)  Pulse: (!) 43 (01/10/23 1159)  Resp: 18 (01/10/23  1145)  BP: (!) 91/51 (01/10/23 1145)  SpO2: (!) 92 % (01/10/23 1145)   Vital Signs (24h Range):  Temp:  [96.4 °F (35.8 °C)-98.3 °F (36.8 °C)] 96.7 °F (35.9 °C)  Pulse:  [41-66] 43  Resp:  [16-18] 18  SpO2:  [91 %-99 %] 92 %  BP: ()/(42-55) 91/51     Weight: 86.1 kg (189 lb 13.1 oz)  Body mass index is 29.73 kg/m².    SpO2: (!) 92 %         Intake/Output Summary (Last 24 hours) at 1/10/2023 1218  Last data filed at 1/10/2023 0643  Gross per 24 hour   Intake 1388.31 ml   Output 550 ml   Net 838.31 ml       Lines/Drains/Airways       Drain  Duration                  Suprapubic Catheter 12/13/22 100% silicone 20 Fr. 28 days              Line  Duration                  Subcutaneous Infusion Pump Abdomen (Comment) -- days              Peripheral Intravenous Line  Duration                  Peripheral IV - Single Lumen 01/09/23 0020 Right Antecubital 1 day                    Physical Exam  Constitutional:       General: She is not in acute distress.     Appearance: She is well-developed.   Cardiovascular:      Rate and Rhythm: Normal rate and regular rhythm.      Heart sounds: No murmur heard.    No gallop.   Pulmonary:      Effort: Pulmonary effort is normal. No respiratory distress.      Breath sounds: Normal breath sounds. No wheezing.   Abdominal:      General: Bowel sounds are normal. There is no distension.      Palpations: Abdomen is soft.      Tenderness: There is no abdominal tenderness.   Skin:     General: Skin is warm and dry.   Neurological:      Mental Status: She is alert and oriented to person, place, and time.       Significant Labs: BMP:   Recent Labs   Lab 01/08/23  2347 01/09/23 0443 01/10/23  0334   GLU 90 96 93    135* 139   K 3.2* 3.2* 4.4   CL 95 98 104   CO2 31* 29 29   BUN 12 13 10   CREATININE 1.1 0.9 0.9   CALCIUM 9.3 8.7 8.5*    and CBC   Recent Labs   Lab 01/09/23  0443 01/09/23  2030 01/10/23  0334   WBC 5.91 6.17 5.29   HGB 8.4* 9.0* 8.5*   HCT 27.5* 29.7* 28.5*    183 174        Significant Imaging: Echocardiogram: Transthoracic echo (TTE) complete (Cupid Only):   Results for orders placed or performed during the hospital encounter of 07/27/22   Echo   Result Value Ref Range    BSA 1.99 m2    TDI SEPTAL 0.08 m/s    LV LATERAL E/E' RATIO 7.00 m/s    LV SEPTAL E/E' RATIO 10.50 m/s    LA WIDTH 3.93 cm    AORTIC VALVE CUSP SEPERATION 1.89 cm    TDI LATERAL 0.12 m/s    PV PEAK VELOCITY 1.00 cm/s    LVIDd 4.81 3.5 - 6.0 cm    IVS 0.89 0.6 - 1.1 cm    Posterior Wall 1.08 0.6 - 1.1 cm    Ao root annulus 2.65 cm    LVIDs 2.62 2.1 - 4.0 cm    FS 46 28 - 44 %    LA volume 61.93 cm3    LV mass 167.32 g    LA size 3.48 cm    RVDD 2.24 cm    Left Ventricle Relative Wall Thickness 0.45 cm    AV mean gradient 6 mmHg    AV valve area 2.22 cm2    AV Velocity Ratio 0.78     AV index (prosthetic) 0.69     MV mean gradient 1 mmHg    MV valve area p 1/2 method 3.09 cm2    MV valve area by continuity eq 2.19 cm2    E/A ratio 1.04     Mean e' 0.10 m/s    E wave deceleration time 245.50 msec    IVRT 65.65 msec    LVOT diameter 2.03 cm    LVOT area 3.2 cm2    LVOT peak jorge 1.27 m/s    LVOT peak VTI 26.40 cm    Ao peak jorge 1.63 m/s    Ao VTI 38.44 cm    LVOT stroke volume 85.40 cm3    AV peak gradient 11 mmHg    MV peak gradient 4 mmHg    E/E' ratio 8.40 m/s    MV Peak E Jorge 0.84 m/s    TR Max Jorge 2.63 m/s    MV VTI 38.95 cm    MV stenosis pressure 1/2 time 71.20 ms    MV Peak A Jorge 0.81 m/s    LV Systolic Volume 25.10 mL    LV Systolic Volume Index 13.0 mL/m2    LV Diastolic Volume 108.03 mL    LV Diastolic Volume Index 55.97 mL/m2    LA Volume Index 32.1 mL/m2    LV Mass Index 87 g/m2    RA Major Axis 5.46 cm    Left Atrium Minor Axis 4.89 cm    Left Atrium Major Axis 5.85 cm    Triscuspid Valve Regurgitation Peak Gradient 28 mmHg    LA Volume Index (Mod) 30.9 mL/m2    LA volume (mod) 59.73 cm3    RA Width 3.21 cm    EF 60 %    Right Atrial Pressure (from IVC) 3 mmHg    TV rest pulmonary artery pressure 31  mmHg    Narrative    · Concentric remodeling and normal systolic function.  · The estimated ejection fraction is 60%.  · Normal left ventricular diastolic function.  · Normal right ventricular size with normal right ventricular systolic   function.  · Mild tricuspid regurgitation.  · Mild to moderate pulmonic regurgitation.  · Normal central venous pressure (3 mmHg).  · The estimated PA systolic pressure is 31 mmHg.

## 2023-01-10 NOTE — ASSESSMENT & PLAN NOTE
- HR 43-60 per Epic overnight; telemetry reviewed with lowest HR noted to 40 SB with no pauses or AVB noted  - not on BB, CCB or other AV mir blockers as an outpatient  - baseline HR per Epic review 42-63; EKG with no acute issues  - asymptomatic at this time; no intervention needed; avoid AV mir blockers and monitor on telemetry while admitted

## 2023-01-10 NOTE — ASSESSMENT & PLAN NOTE
Recently exchanged. Exchanged every 3 days  ED provider repositioned it this admission  Continue to monitor urine output  Urology consulted to replace 1/9 - now clear yellow UOP

## 2023-01-10 NOTE — ASSESSMENT & PLAN NOTE
Resolved - Na 139  ->Likely hypovolemic hyponatremia  ->continue to monitor  -> encourage fluid intake

## 2023-01-10 NOTE — ASSESSMENT & PLAN NOTE
· ->UA/UC from 01/06 showed staph aureus, sensitive to oxacillin and bacterium  · ->repeat UA and UC from 01/08 - staph aureus  · ->s/p clindamycin in ED.   · ->s/p augmentin for a total 5-7 day course  · Changed to ancef 1/9

## 2023-01-10 NOTE — PT/OT/SLP PROGRESS
Physical Therapy Treatment    Patient Name:  Tasha Hawley   MRN:  [id number]    Recommendations:     Discharge Recommendations:  (TBD)  Discharge Equipment Recommendations: none  Barriers to discharge:  decreased mobility,strength and endur ance    Assessment:     Tasha Hawley is a 67 y.o. female admitted with a medical diagnosis of UTI (urinary tract infection).  She presents with the following impairments/functional limitations: weakness, impaired endurance, impaired functional mobility, gait instability, impaired balance, decreased upper extremity function, decreased lower extremity function, decreased ROM, impaired coordination, impaired cardiopulmonary response to activity, impaired joint extensibility,pt with good participation and limited by low BP's today as per nsg,pt will benefit from continuing PT services upon discharge.    Rehab Prognosis: Fair; patient would benefit from acute skilled PT services to address these deficits and reach maximum level of function.    Recent Surgery: * No surgery found *      Plan:     During this hospitalization, patient to be seen 5 x/week to address the identified rehab impairments via gait training, therapeutic activities, therapeutic exercises, neuromuscular re-education and progress toward the following goals:    Plan of Care Expires:  02/09/23    Subjective     Chief Complaint: n/a  Patient/Family Comments/goals: pt did not sleep well.  Pain/Comfort:  Pain Rating 1: 0/10      Objective:     Communicated with nsg prior to session.  Patient found supine with oxygen, peripheral IV, SCD upon PT entry to room.     General Precautions: Standard, fall, hearing impaired  Orthopedic Precautions: N/A  Braces: N/A  Respiratory Status: Nasal cannula, flow 2 L/min     Functional Mobility:  Gait: n/a      AM-PAC 6 CLICK MOBILITY  Turning over in bed (including adjusting bedclothes, sheets and blankets)?: 3  Sitting down on and standing up from a chair with arms (e.g.,  wheelchair, bedside commode, etc.): 3  Moving from lying on back to sitting on the side of the bed?: 2  Moving to and from a bed to a chair (including a wheelchair)?: 3  Need to walk in hospital room?: 3  Climbing 3-5 steps with a railing?: 1  Basic Mobility Total Score: 15       Treatment & Education: gentle le AAROM in available planes with rest periods,no further rx as per nsg secondary to low BP's.      Patient left supine with all lines intact, call button in reach, bed alarm on, and nsg notified..    GOALS: see general POC  Multidisciplinary Problems       Physical Therapy Goals          Problem: Physical Therapy    Goal Priority Disciplines Outcome Goal Variances Interventions   Physical Therapy Goal     PT, PT/OT Ongoing, Progressing     Description: Goals to be met by: 23     Patient will increase functional independence with mobility by performin. Supine to sit with Stand-by Assistance  2. Sit to supine with CGA  3. Sit to stand transfer with Stand-by Assistance  4. Bed to chair transfer with Stand-by Assistance using Rolling Walker  5. Gait  x 50 feet with Stand-by Assistance using Rolling Walker.                          Time Tracking:     PT Received On: 01/10/23  PT Start Time: 1302     PT Stop Time:    PT Total Time (min):       Billable Minutes: Therapeutic Activity 11 and Therapeutic Exercise 12    Treatment Type: Treatment  PT/PTA: PTA     PTA Visit Number: 1     01/10/2023

## 2023-01-10 NOTE — CONSULTS
[facility] - Telemetry  Cardiology  Consult Note    Patient Name: Tasha Hawley  MRN: [id number]  Admission Date: 1/8/2023  Hospital Length of Stay: 0 days  Code Status: Full Code   Attending Provider: Justyna Hinton*   Consulting Provider: DIAN Charles, ANP  Primary Care Physician: Mesfin Hodges Ii, MD  Principal Problem:UTI (urinary tract infection)    Patient information was obtained from patient, past medical records and ER records.     Inpatient consult to [facility]  Consult performed by: DIAN Vivas, ANP  Consult ordered by: Lisa Carrasco NP  Reason for consult: bradycardia         Subjective:     Chief Complaint:  Blood in urine      HPI:   68yo female with chronic meadows- suprapubic catheter, chronic diastolic heart failure, UTI, chest pain, CAD known LAD occlusion with R-L collaterals, sleep apnea, bradycardia, hypokalemic and hyponatremia who presented to the ER with blood in urine. She complains of decreased urine output to catheter bag with notation of rocío blood therefore she presented to the ER for evaluation. She reported feeling poorly last week ie fatigue but was uncertain why. She was seen by Dr. Ceballos in August 2022 for pre operative clearance. She reports chest pain with exertion for the past few months but does not feel it is worsening. She lives at home and uses a walker and wheelchair to get around. She reports it can ease with rest. She denies any dizziness, palpitations or syncope. EKG SB with no acute STTWC H&H 8.5&28.5 BMP WNL. Cardiology consulted due to concern for bradycardia        Past Medical History:   Diagnosis Date    Anticoagulant long-term use     Anxiety     Arthritis     Bilateral lower extremity edema     severe chronic    Carotid artery occlusion     Cataract     CHF (congestive heart failure)     Coronary artery disease     subtotalled LAD with collateral    Depression     Fever blister     Hard of hearing      Hypothyroid     Iron deficiency anemia     Lumbar radiculopathy     with chronic pain    Ocular migraine     Renal disorder     Sleep apnea     cpap       Past Surgical History:   Procedure Laterality Date    ADENOIDECTOMY      BACK SURGERY      x 12    CARDIAC CATHETERIZATION  2016    subtotalled LAD with right to left collaterals    CATARACT EXTRACTION W/  INTRAOCULAR LENS IMPLANT Left     Dr Coleman     CYSTOSCOPIC LITHOLAPAXY N/A 6/27/2019    Procedure: CYSTOLITHOLAPAXY;  Surgeon: Shireen Mayo MD;  Location: [facility] OR 2ND FLR;  Service: Urology;  Laterality: N/A;    CYSTOSCOPIC LITHOLAPAXY N/A 9/3/2019    Procedure: CYSTOLITHOLAPAXY;  Surgeon: Shireen Mayo MD;  Location: [facility] OR 2ND FLR;  Service: Urology;  Laterality: N/A;    CYSTOSCOPY N/A 7/13/2021    Procedure: CYSTOSCOPY;  Surgeon: Shireen Mayo MD;  Location: [facility] OR 1ST FLR;  Service: Urology;  Laterality: N/A;    CYSTOSCOPY  11/16/2021    Procedure: CYSTOSCOPY;  Surgeon: Shireen Mayo MD;  Location: [facility] OR 1ST FLR;  Service: Urology;;    CYSTOSCOPY  7/19/2022    Procedure: CYSTOSCOPY;  Surgeon: Shireen Mayo MD;  [facility] OR 1ST FLR;  Service: Urology;;    CYSTOSCOPY WITH INJECTION OF PERIURETHRAL BULKING AGENT  7/19/2022    Procedure: CYSTOSCOPY, WITH PERIURETHRAL BULKING AGENT INJECTION-MACROPLASTIQUE;  Surgeon: Shireen Mayo MD;  Location: [facility] OR 1ST FLR;  Service: Urology;;    CYSTOSCOPY,WITH BOTULINUM TOXIN INJECTION N/A 12/13/2022    Procedure: CYSTOSCOPY,WITH BOTULINUM TOXIN INJECTION;  Surgeon: Shireen Mayo MD;  Location: [facility] OR 1ST FLR;  Service: Urology;  Laterality: N/A;  300 U    ESOPHAGOGASTRODUODENOSCOPY N/A 5/23/2018    Procedure: ESOPHAGOGASTRODUODENOSCOPY (EGD);  Surgeon: Prince Vance MD;  Location: [facility] ENDO (4TH FLR);  Service: Endoscopy;  Laterality: N/A;  r/s 'd per Dr. Vance due to family emergency- ER    HYSTERECTOMY  1975    endometriosis    INJECTION OF BOTULINUM  TOXIN TYPE A  7/13/2021    Procedure: INJECTION, BOTULINUM TOXIN, 200units;  Surgeon: Shrieen Mayo MD;  Location: [facility] OR [address] [place] [address];  Service: Urology;;    INJECTION OF BOTULINUM TOXIN TYPE A  11/16/2021    Procedure: INJECTION, BOTULINUM TOXIN, 200units;  Surgeon: Shireen Mayo MD;  Location: [facility] OR [address] [place] [address];  Service: Urology;;    INJECTION OF BOTULINUM TOXIN TYPE A  7/19/2022    Procedure: INJECTION, BOTULINUM TOXIN, 300 units ;  Surgeon: Shireen Mayo MD;  Location: [facility] OR [address] [place] [address];  Service: Urology;;    INSERTION, SUPRAPUBIC CATHETER N/A 12/13/2022    Procedure: INSERTION, SUPRAPUBIC CATHETER;  Surgeon: Shireen Mayo MD;  Location: [facility] OR [address] [place] [address];  Service: Urology;  Laterality: N/A;  exchange    pain pump placement      SQ Dilaudid Pump managed by Dr. Hillman, Pain Management    REMOVAL OF BONE SPUR OF FOOT Bilateral 9/16/2022    Procedure: EXCISION ARTHRITIC BONE, BILATERAL FOOT;  Surgeon: Adam Mcguire DPM;  Location: [facility];  Service: Podiatry;  Laterality: Bilateral;    REPLACEMENT OF CATHETER N/A 10/31/2019    Procedure: REPLACEMENT, CATHETER-SUPRAPUBIC;  Surgeon: Shireen Mayo MD;  Location: [facility] OR [address] [place] [address];  Service: Urology;  Laterality: N/A;    SPINAL CORD STIMULATOR REMOVAL      before [place]    SPINE SURGERY  5-13-13    CERVICAL FUSION    TONSILLECTOMY         Review of patient's allergies indicates:   Allergen Reactions    (d)-limonene flavor      Other reaction(s): difficult intubation  Other reaction(s): Difficulty breathing    Bactrim [sulfamethoxazole-trimethoprim] Anaphylaxis    Benadryl [diphenhydramine hcl] Shortness Of Breath    Fentanyl Itching, Nausea And Vomiting and Swelling             Imitrex [sumatriptan succinate] Shortness Of Breath    Percocet [oxycodone-acetaminophen] Shortness Of Breath    Topamax [topiramate] Shortness Of Breath    Vancomycin Shortness Of Breath     Rash    Butorphanol tartrate     Darvocet a500 [propoxyphene  n-acetaminophen]      Other reaction(s): Difficulty breathing    Evening primrose (oenothera biennis)     White petrolatum-zinc     Zinc oxide-white petrolatum      Other reaction(s): Difficulty breathing    Latex, natural rubber Itching and Rash    Phenytoin Rash and Other (See Comments)     Trouble breathing       No current facility-administered medications on file prior to encounter.     Current Outpatient Medications on File Prior to Encounter   Medication Sig    aspirin (ECOTRIN) 81 MG EC tablet Take 1 tablet (81 mg total) by mouth once daily.    atorvastatin (LIPITOR) 80 MG tablet TAKE ONE TABLET BY MOUTH EVERY DAY    butalbital-acetaminophen-caffeine -40 mg (FIORICET, ESGIC) -40 mg per tablet Take 1 tablet by mouth daily as needed.    FLUoxetine 20 MG capsule Take 3 capsules (60 mg total) by mouth once daily.    furosemide (LASIX) 40 MG tablet Take 1 tablet (40 mg total) by mouth once daily.    HYDROcodone-acetaminophen (NORCO)  mg per tablet Take 1 tablet by mouth every 6 (six) hours as needed for Pain.    intrathecal pain pump compound Hydromorphone (7.5 mg/mL) infusion at 3.6799 mg/day (0.1533 mg/hr) out of a total reservoir volume of 37.3 mL  Pump filled every 2 months    levothyroxine (SYNTHROID) 137 MCG Tab tablet Take 1 tablet (137 mcg total) by mouth before breakfast.    pantoprazole (PROTONIX) 40 MG tablet Take 1 tablet (40 mg total) by mouth once daily.    potassium chloride SA (K-DUR,KLOR-CON) 20 MEQ tablet Take 1 tablet (20 mEq total) by mouth 2 (two) times daily.    QUEtiapine (SEROQUEL) 100 MG Tab TAKE 2 TABLETS (200 MG) BY MOUTH NIGHTLY    QUEtiapine (SEROQUEL) 25 MG Tab Take 12.5-25 mg twice daily as needed for anxiety    senna (SENNA LAX) 8.6 mg tablet Take 2 tablets by mouth 2 (two) times daily.    tiZANidine (ZANAFLEX) 4 MG tablet Take 4 mg by mouth 2 (two) times daily.    traZODone (DESYREL) 300 MG tablet Take 1 tablet (300 mg total) by mouth every  evening.    nitroGLYCERIN (NITROSTAT) 0.4 MG SL tablet Place 1 tablet (0.4 mg total) under the tongue every 5 (five) minutes as needed for Chest pain.    promethazine (PHENERGAN) 25 MG tablet Take 25 mg by mouth every 6 (six) hours as needed for Nausea.     Family History       Problem Relation (Age of Onset)    Cancer Mother (55), Father    Cirrhosis Paternal Aunt, Paternal Uncle    Colon cancer Maternal Uncle    Esophageal cancer Father    Heart disease Maternal Uncle    Liver disease Paternal Aunt, Paternal Uncle    No Known Problems Brother, Brother, Sister, Maternal Aunt, Maternal Grandfather, Paternal Grandmother, Paternal Grandfather    Parkinsonism Maternal Grandmother    Tremor Maternal Grandmother          Tobacco Use    Smoking status: Never    Smokeless tobacco: Never   Substance and Sexual Activity    Alcohol use: Never    Drug use: No    Sexual activity: Never     Partners: Male     Review of Systems   Constitutional: Negative for chills, decreased appetite, diaphoresis and fever.   Cardiovascular:  Positive for chest pain. Negative for claudication, cyanosis, dyspnea on exertion, irregular heartbeat, leg swelling, near-syncope, orthopnea, palpitations, paroxysmal nocturnal dyspnea and syncope.   Respiratory:  Negative for cough, hemoptysis, shortness of breath and wheezing.    Gastrointestinal:  Negative for bloating, abdominal pain, constipation, diarrhea, melena, nausea and vomiting.   Genitourinary:  Positive for hematuria.   Neurological:  Negative for dizziness and weakness.   Objective:     Vital Signs (Most Recent):  Temp: 96.7 °F (35.9 °C) (01/10/23 1145)  Pulse: (!) 43 (01/10/23 1159)  Resp: 18 (01/10/23 1145)  BP: (!) 91/51 (01/10/23 1145)  SpO2: (!) 92 % (01/10/23 1145)   Vital Signs (24h Range):  Temp:  [96.4 °F (35.8 °C)-98.3 °F (36.8 °C)] 96.7 °F (35.9 °C)  Pulse:  [41-66] 43  Resp:  [16-18] 18  SpO2:  [91 %-99 %] 92 %  BP: ()/(42-55) 91/51     Weight: 86.1 kg (189 lb 13.1  oz)  Body mass index is 29.73 kg/m².    SpO2: (!) 92 %         Intake/Output Summary (Last 24 hours) at 1/10/2023 1218  Last data filed at 1/10/2023 0643  Gross per 24 hour   Intake 1388.31 ml   Output 550 ml   Net 838.31 ml       Lines/Drains/Airways       Drain  Duration                  Suprapubic Catheter 12/13/22 100% silicone 20 Fr. 28 days              Line  Duration                  Subcutaneous Infusion Pump Abdomen (Comment) -- days              Peripheral Intravenous Line  Duration                  Peripheral IV - Single Lumen 01/09/23 0020 Right Antecubital 1 day                    Physical Exam  Constitutional:       General: She is not in acute distress.     Appearance: She is well-developed.   Cardiovascular:      Rate and Rhythm: Normal rate and regular rhythm.      Heart sounds: No murmur heard.    No gallop.   Pulmonary:      Effort: Pulmonary effort is normal. No respiratory distress.      Breath sounds: Normal breath sounds. No wheezing.   Abdominal:      General: Bowel sounds are normal. There is no distension.      Palpations: Abdomen is soft.      Tenderness: There is no abdominal tenderness.   Skin:     General: Skin is warm and dry.   Neurological:      Mental Status: She is alert and oriented to person, place, and time.       Significant Labs: BMP:   Recent Labs   Lab 01/08/23  2347 01/09/23 0443 01/10/23  0334   GLU 90 96 93    135* 139   K 3.2* 3.2* 4.4   CL 95 98 104   CO2 31* 29 29   BUN 12 13 10   CREATININE 1.1 0.9 0.9   CALCIUM 9.3 8.7 8.5*    and CBC   Recent Labs   Lab 01/09/23  0443 01/09/23  2030 01/10/23  0334   WBC 5.91 6.17 5.29   HGB 8.4* 9.0* 8.5*   HCT 27.5* 29.7* 28.5*    183 174       Significant Imaging: Echocardiogram: Transthoracic echo (TTE) complete ([facility] Only):   Results for orders placed or performed during the hospital encounter of 07/27/22   Echo   Result Value Ref Range    BSA 1.99 m2    TDI SEPTAL 0.08 m/s    LV LATERAL E/E' RATIO 7.00 m/s    LV  SEPTAL E/E' RATIO 10.50 m/s    LA WIDTH 3.93 cm    AORTIC VALVE CUSP SEPERATION 1.89 cm    TDI LATERAL 0.12 m/s    PV PEAK VELOCITY 1.00 cm/s    LVIDd 4.81 3.5 - 6.0 cm    IVS 0.89 0.6 - 1.1 cm    Posterior Wall 1.08 0.6 - 1.1 cm    Ao root annulus 2.65 cm    LVIDs 2.62 2.1 - 4.0 cm    FS 46 28 - 44 %    LA volume 61.93 cm3    LV mass 167.32 g    LA size 3.48 cm    RVDD 2.24 cm    Left Ventricle Relative Wall Thickness 0.45 cm    AV mean gradient 6 mmHg    AV valve area 2.22 cm2    AV Velocity Ratio 0.78     AV index (prosthetic) 0.69     MV mean gradient 1 mmHg    MV valve area p 1/2 method 3.09 cm2    MV valve area by continuity eq 2.19 cm2    E/A ratio 1.04     Mean e' 0.10 m/s    E wave deceleration time 245.50 msec    IVRT 65.65 msec    LVOT diameter 2.03 cm    LVOT area 3.2 cm2    LVOT peak jorge 1.27 m/s    LVOT peak VTI 26.40 cm    Ao peak jorge 1.63 m/s    Ao VTI 38.44 cm    LVOT stroke volume 85.40 cm3    AV peak gradient 11 mmHg    MV peak gradient 4 mmHg    E/E' ratio 8.40 m/s    MV Peak E Jorge 0.84 m/s    TR Max Jorge 2.63 m/s    MV VTI 38.95 cm    MV stenosis pressure 1/2 time 71.20 ms    MV Peak A Jorge 0.81 m/s    LV Systolic Volume 25.10 mL    LV Systolic Volume Index 13.0 mL/m2    LV Diastolic Volume 108.03 mL    LV Diastolic Volume Index 55.97 mL/m2    LA Volume Index 32.1 mL/m2    LV Mass Index 87 g/m2    RA Major Axis 5.46 cm    Left Atrium Minor Axis 4.89 cm    Left Atrium Major Axis 5.85 cm    Triscuspid Valve Regurgitation Peak Gradient 28 mmHg    LA Volume Index (Mod) 30.9 mL/m2    LA volume (mod) 59.73 cm3    RA Width 3.21 cm    EF 60 %    Right Atrial Pressure (from IVC) 3 mmHg    TV rest pulmonary artery pressure 31 mmHg    Narrative    · Concentric remodeling and normal systolic function.  · The estimated ejection fraction is 60%.  · Normal left ventricular diastolic function.  · Normal right ventricular size with normal right ventricular systolic   function.  · Mild tricuspid regurgitation.  ·  Mild to moderate pulmonic regurgitation.  · Normal central venous pressure (3 mmHg).  · The estimated PA systolic pressure is 31 mmHg.        Assessment and Plan:     Chronic diastolic heart failure  - echo in July with normal LVEF  - previous admission with ADHF treated with IV diuresis; on Lasix daily as an outpatient  - euvolemic on exam; no acute decompensation present  - repeat echo today     Bradycardia  - HR 43-60 per Epic overnight; telemetry reviewed with lowest HR noted to 40 SB with no pauses or AVB noted  - not on BB, CCB or other AV mir blockers as an outpatient  - baseline HR per Epic review 42-63; EKG with no acute issues  - asymptomatic at this time; no intervention needed; avoid AV mir blockers and monitor on telemetry while admitted     Coronary artery disease of native artery with stable angina pectoris  - history of CAD with LAD with R-L collaterals  - reports chest pain which appears to be related to chronic stable angina; no worsening complaints per patient  - physical limitations present  - recommend medical management although limited given hematuria; ASA when cleared by Urology; recommend statin but no BB given baseline bradycardia; could use Imdur starting at 30 mg if BP tolerates in attempts to treat chronic stable angina  - echo today; no plans for invasive treatment at this time         VTE Risk Mitigation (From admission, onward)         Ordered     enoxaparin injection 40 mg  Daily         01/09/23 0107     IP VTE HIGH RISK PATIENT  Once         01/09/23 0107     Place sequential compression device  Until discontinued         01/09/23 0107                Thank you for your consult. I will follow-up with patient. Please contact us if you have any additional questions.    DIAN Charles, ANP  Cardiology   [facility] - Telemetry

## 2023-01-10 NOTE — PLAN OF CARE
Dr. Hodges's MA has informed medical staff to contact at time of discharge to coordinate pt appointment to ensure scheduling accuracy as pt has had rapid response and they are unsure of pt discharge date.     Please schedule PCP at time of discharge   IB sent to urology Dr. Mayo for scheduling      Future Appointments   Date Time Provider Department Center   2/17/2023  8:00 AM Juan A Madera MD [facility] PSYCH [facility]   3/3/2023  2:00 PM Sunni Starks MD [facility] DERM [facility]   7/7/2023 11:00 AM Mesfin Hodges II, MD [facility] PCW     [facility] Case Management  [phone]

## 2023-01-10 NOTE — ASSESSMENT & PLAN NOTE
- echo in July with normal LVEF  - previous admission with ADHF treated with IV diuresis; on Lasix daily as an outpatient  - euvolemic on exam; no acute decompensation present  - repeat echo today

## 2023-01-10 NOTE — PT/OT/SLP PROGRESS
Occupational Therapy      Patient Name:  Tasha Hawley   MRN:  [id number]    4:24 PM Patient not seen today secondary to Transfer to ICU, await clearance. Will follow-up as able.    1/10/2023

## 2023-01-10 NOTE — NURSING
Patient HR sustaining 38-39 bpm. BP 90/50. Patient states having CP 8/10 and nausea. Cardiology NP notified. Orders for STAT troponin and EKG placed. Will closely monitor.

## 2023-01-10 NOTE — NURSING
RAPID RESPONSE NURSE PROACTIVE ROUNDING NOTE       Time of Visit: 1400    Admit Date: 2023  LOS: 0  Code Status: Full Code   Date of Visit: 01/10/2023  : 1955  Age: 67 y.o.  Sex: female  Race: White  Bed: K403/K403 A:   MRN: [id number]  Was the patient discharged from an ICU this admission? No   Was the patient discharged from a PACU within last 24 hours? No   Did the patient receive conscious sedation/general anesthesia in last 24 hours? No   Was the patient in the ED within the past 24 hours? No   Was the patient on NIPPV within the past 24 hours? No   Attending Physician: Justyna Hinton*  Primary Service: Hospitalist,Hospice and Palliative Medicine   Time spent at the bedside: < 15 min    SITUATION    Notified by bedside RN via phone call  Reason for alert: Bradycardia, hypoTN, decreased mental status.    Diagnosis: UTI (urinary tract infection)   has a past medical history of Anticoagulant long-term use, Anxiety, Arthritis, Bilateral lower extremity edema, Carotid artery occlusion, Cataract, CHF (congestive heart failure), Coronary artery disease, Depression, Fever blister, Hard of hearing, Hypokalemia, Hyponatremia, Hypothyroid, Iron deficiency anemia, Lumbar radiculopathy, Ocular migraine, Renal disorder, and Sleep apnea.    Last Vitals:  Temp: 97 °F (36.1 °C) (01/10 1355)  Pulse: 39 (01/10 1355)  Resp: 16 (01/10 1355)  BP: 88/50 (01/10 1355)  SpO2: 95 % (01/10 1240)    24 Hour Vitals Range:  Temp:  [96.4 °F (35.8 °C)-98.3 °F (36.8 °C)]   Pulse:  [39-66]   Resp:  [16-18]   BP: (81-93)/(42-55)   SpO2:  [91 %-99 %]     Clinical Issues: Circulatory    ASSESSMENT/INTERVENTIONS    Pt bradycardic in the 30's, soft BP of 88/50, and drowsy but oriented. Pt reports chest soreness 8/10. Denies SOB.     RECOMMENDATIONS  Transfer to ICU for closer monitoring .    Discussed plan of care with bedside RN, Ilya    PROVIDER ESCALATION    Physician escalation: Yes    Orders received and case discussed with  Dr. Garcia .    Disposition:Tx in ICU bed 568    FOLLOW UP    Call back the Rapid Response NurseKarla at [phone] for additional questions or concerns.

## 2023-01-10 NOTE — PLAN OF CARE
SW met with pt via [employer] to confirm demographic information and DCA completed 1/9/23. SW confirmed pt lives at home with spouse and have support of [facility]. SW sent update in careport. Pt has friend at bedside who was able to answer some questions and awaken pt when she feel asleep while answering. Pt confirmed at time of discharge pt to be transported back home by spouse. SW updated whiteboard with SW name and contact information. SW confirmed pt understanding of Observation unit and expected discharge plan. SW will continue to follow pt throughout care and assist with any discharge needs.      SW requested Urology and PCP follow up.    01/10/23 1434   Post-Acute Status   Post-Acute Authorization Home Health   Home Health Status Set-up Complete/Auth obtained   Hospital Resources/Appts/Education Provided Appointments scheduled and added to AVS   Discharge Delays None known at this time   Discharge Plan   Discharge Plan A Home with family;Home Health       Future Appointments   Date Time Provider Department Center   2/17/2023  8:00 AM Juan A Madera MD [facility] PSYCH [facility]   3/3/2023  2:00 PM Sunni Starks MD [facility] DERM [facility]   7/7/2023 11:00 AM Mesfin Hodges II, MD [facility]     [facility] Case Management  [phone]

## 2023-01-10 NOTE — ASSESSMENT & PLAN NOTE
· HR as low as 39 bpm - patient asymptomatic except for fatigue  · Cardiology consulted  · Echo pending  · Maintain telemetry

## 2023-01-10 NOTE — CARE UPDATE
Patient transferred to the ICU for hypotension and sinus bradycardia (asymptomatic). Patient is very well-appearing, awake and alert. Given normal WBC, normal lactic, and appropriate abx I find it unlikely that this is just attributable to uncontrolled sepsis.     - POCUS performed with normal appearing EF, no WM abnormalities. Formal TTE pending  - Upon talking with patient, she takes trazodone once a day at home (has received 2 doses here, is associated with bradycardia), does not take tizanidine (has received x3  here, some reports of associated bradycardia), and takes only 100mg of seroquel. Perhaps polypharmacy is contributing?  - cautious with sedating meds  - Check TSH (ordered)      Joanne Galvin MD  Pulmonary and Critical Care Fellow  01/10/2023  4:52 PM

## 2023-01-10 NOTE — SUBJECTIVE & OBJECTIVE
Interval History: Patient in no acute distress but states her back pain is unrelieved. Explained to her that w/ her BP and HR being as low as they are, I cannot give her any breakthrough pain meds at this time. Spoke with patient about palliative care to see if they could possibly assist at this time. Patient agreeable and requested the consult be placed.    Review of Systems   Constitutional:  Positive for fatigue. Negative for appetite change and fever.   HENT:  Negative for trouble swallowing.    Respiratory:  Positive for shortness of breath. Negative for cough and wheezing.    Cardiovascular:  Positive for leg swelling. Negative for chest pain and palpitations.   Gastrointestinal:  Negative for abdominal pain, nausea and vomiting.   Genitourinary:         Suprapubic catheter   Musculoskeletal:  Positive for back pain, gait problem and myalgias.   Neurological:  Positive for weakness. Negative for dizziness, light-headedness, numbness and headaches.   Psychiatric/Behavioral:  Negative for agitation and confusion. The patient is not nervous/anxious.    Objective:     Vital Signs (Most Recent):  Temp: 96.7 °F (35.9 °C) (01/10/23 1145)  Pulse: (!) 39 (01/10/23 1240)  Resp: 18 (01/10/23 1240)  BP: (!) 91/51 (01/10/23 1145)  SpO2: 95 % (01/10/23 1240)   Vital Signs (24h Range):  Temp:  [96.4 °F (35.8 °C)-98.3 °F (36.8 °C)] 96.7 °F (35.9 °C)  Pulse:  [39-66] 39  Resp:  [16-18] 18  SpO2:  [91 %-99 %] 95 %  BP: (81-93)/(42-55) 91/51     Weight: 86.1 kg (189 lb 13.1 oz)  Body mass index is 29.73 kg/m².    Intake/Output Summary (Last 24 hours) at 1/10/2023 1300  Last data filed at 1/10/2023 0643  Gross per 24 hour   Intake 1388.31 ml   Output 550 ml   Net 838.31 ml      Physical Exam  Vitals and nursing note reviewed.   Constitutional:       General: She is not in acute distress.     Appearance: She is ill-appearing.   HENT:      Head: Normocephalic and atraumatic.   Cardiovascular:      Rate and Rhythm: Regular rhythm.  Bradycardia present.      Heart sounds: Normal heart sounds.   Pulmonary:      Effort: Pulmonary effort is normal.      Breath sounds: Decreased breath sounds present.   Abdominal:      General: Bowel sounds are normal. There is no distension.      Palpations: Abdomen is soft.      Tenderness: There is no abdominal tenderness.   Genitourinary:     Comments: Suprapubic catheter present - draining clear yellow urine w/o blood clots  Musculoskeletal:      Right lower le+ Edema present.      Left lower le+ Edema present.   Skin:     General: Skin is warm.   Neurological:      Mental Status: She is alert and oriented to person, place, and time.      Motor: Weakness present.      Gait: Gait abnormal.   Psychiatric:         Mood and Affect: Mood normal.         Behavior: Behavior normal.         Thought Content: Thought content normal.         Judgment: Judgment normal.       Significant Labs: All pertinent labs within the past 24 hours have been reviewed.    Significant Imaging: I have reviewed all pertinent imaging results/findings within the past 24 hours.

## 2023-01-10 NOTE — CONSULTS
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 Pt has hx neurogenic bladder and extensive prior admissions and ED visits for urinary retention and/or cystitis. 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    Chief Complaint - 1/8    "  Patient presents to the ED with complaints of having decreased urine output from meadows catheter and having blood in catheter bag. Urine catheter is permanent indwelling but states it is changed every three days.     "    Hematuria and clots were noted in ED and cath urine was grossly positive for macroscopic hematuria and pyuria, clots reported. Catheter exchanged. Started on clindamycin for complicated cystitis and admitted to .    Interval Hospital Course    1/9: Abx narrowed per recent culture results PTA. PRN [place] started per home med list, along with scheduled tizanidinine, seroquel, trazodone. Orthostatic hypotension noted in afternoon with bradycardia. Refused CPAP.    1/10: Persistent hypotension despite 1.5L IVF. Lasix held. Stepped up to ICU for progressive low BP and HR with symptoms of fatigue.    Palliative has been consulted for symptom management.    At this time pt denies dysuria and complains primarily of intractable shooting/burning pains radiating from her low back to her feet, present x years and driving her high basal intrathecal dose (equivalent to 4mg/hr IV hydromorphone, or 26mg/hr IV morphine). Pt reports IT hydromorphone rate was recently increased to maximum by " her pain specialist for worsening of this chronic pain.    Pt reports her legs are nontender at rest at this time, denies allodynia to light touch but has moderate to severe tenderness to palpation. Tenderness is not generalized to the upper extremities. Abdomen is nontender.    Writer discussed hydromorphone PCA with pt and primary team however now on hold due to worsening hypotension now on levophed, despite broadly improving indicators of systemic infection, normal lactate not c/w shock.    Usual home rate of intrathecal hydromorphone is infusing.    Past Medical History:   Diagnosis Date    Anticoagulant long-term use     Anxiety     Arthritis     Bilateral lower extremity edema     severe chronic    Carotid artery occlusion     Cataract     CHF (congestive heart failure)     Coronary artery disease     subtotalled LAD with collateral    Depression     Fever blister     Hard of hearing     Hypokalemia 1/9/2023    Hyponatremia 2/4/2022    Hypothyroid     Iron deficiency anemia     Lumbar radiculopathy     with chronic pain    Ocular migraine     Renal disorder     Sleep apnea     cpap     Past Surgical History:   Procedure Laterality Date    ADENOIDECTOMY      BACK SURGERY      x 12    CARDIAC CATHETERIZATION  2016    subtotalled LAD with right to left collaterals    CATARACT EXTRACTION W/  INTRAOCULAR LENS IMPLANT Left     Dr Coleman     CYSTOSCOPIC LITHOLAPAXY N/A 6/27/2019    Procedure: CYSTOLITHOLAPAXY;  Surgeon: Shireen Mayo MD;  Location: [facility] OR [address] [place] [address];  Service: Urology;  Laterality: N/A;    CYSTOSCOPIC LITHOLAPAXY N/A 9/3/2019    Procedure: CYSTOLITHOLAPAXY;  Surgeon: Shireen Mayo MD;  Location: [facility] OR [address] [place] [address];  Service: Urology;  Laterality: N/A;    CYSTOSCOPY N/A 7/13/2021    Procedure: CYSTOSCOPY;  Surgeon: Shireen Mayo MD;  Location: [facility] OR [address] [place] [address];  Service: Urology;  Laterality: N/A;    CYSTOSCOPY  11/16/2021    Procedure: CYSTOSCOPY;  Surgeon: Shireen Mayo MD;  Location:  [facility] OR 1ST FLR;  Service: Urology;;    CYSTOSCOPY  7/19/2022    Procedure: CYSTOSCOPY;  Surgeon: Shireen Mayo MD;  [facility];  Service: Urology;;    CYSTOSCOPY WITH INJECTION OF PERIURETHRAL BULKING AGENT  7/19/2022    Procedure: CYSTOSCOPY, WITH PERIURETHRAL BULKING AGENT INJECTION-MACROPLASTIQUE;  Surgeon: Shireen Mayo MD;  [facility];  Service: Urology;;    CYSTOSCOPY,WITH BOTULINUM TOXIN INJECTION N/A 12/13/2022    Procedure: CYSTOSCOPY,WITH BOTULINUM TOXIN INJECTION;  Surgeon: Shireen Mayo MD;  [facility];  Service: Urology;  Laterality: N/A;  300 U    ESOPHAGOGASTRODUODENOSCOPY N/A 5/23/2018    Procedure: ESOPHAGOGASTRODUODENOSCOPY (EGD);  Surgeon: Prince Vance MD;  Location: [facility] (4TH FLR);  Service: Endoscopy;  Laterality: N/A;  r/s 'd per Dr. Vance due to family emergency- ER    HYSTERECTOMY  1975    endometriosis    INJECTION OF BOTULINUM TOXIN TYPE A  7/13/2021    Procedure: INJECTION, BOTULINUM TOXIN, 200units;  Surgeon: Shireen Mayo MD;  [facility]: [facility];  Service: Urology;;    INJECTION OF BOTULINUM TOXIN TYPE A  11/16/2021    Procedure: INJECTION, BOTULINUM TOXIN, 200units;  Surgeon: Shireen Mayo MD;  [facility]: [facility];  Service: Urology;;    INJECTION OF BOTULINUM TOXIN TYPE A  7/19/2022    Procedure: INJECTION, BOTULINUM TOXIN, 300 units ;  Surgeon: Shireen Mayo MD;  [facility]: [facility];  Service: Urology;;    INSERTION, SUPRAPUBIC CATHETER N/A 12/13/2022    Procedure: INSERTION, SUPRAPUBIC CATHETER;  Surgeon: Shireen Mayo MD;  [facility];  Service: Urology;  Laterality: N/A;  exchange    pain pump placement      SQ Dilaudid Pump managed by Dr. Hillman, Pain Management    REMOVAL OF BONE SPUR OF FOOT Bilateral 9/16/2022    Procedure: EXCISION ARTHRITIC BONE, BILATERAL FOOT;  Surgeon: Adam Mcguire DPM;  Location: [facility];  Service: Podiatry;  Laterality:  Bilateral;    REPLACEMENT OF CATHETER N/A 10/31/2019    Procedure: REPLACEMENT, CATHETER-SUPRAPUBIC;  Surgeon: Shireen Mayo MD;  Location: [facility] OR [address] [place] [address];  Service: Urology;  Laterality: N/A;    SPINAL CORD STIMULATOR REMOVAL      before [place]    SPINE SURGERY  5-13-13    CERVICAL FUSION    TONSILLECTOMY       Family History   Problem Relation Age of Onset    Cancer Mother 55        breast    Cancer Father         esophagus,had laryngectomy    Esophageal cancer Father     Parkinsonism Maternal Grandmother     Tremor Maternal Grandmother     No Known Problems Brother     No Known Problems Brother     Heart disease Maternal Uncle     Colon cancer Maternal Uncle         Less than 60    No Known Problems Sister     No Known Problems Maternal Aunt     Cirrhosis Paternal Aunt         ETOH    Liver disease Paternal Aunt         ETOH    Liver disease Paternal Uncle         ETOH    Cirrhosis Paternal Uncle         ETOH    No Known Problems Maternal Grandfather     No Known Problems Paternal Grandmother     No Known Problems Paternal Grandfather     Melanoma Neg Hx     Amblyopia Neg Hx     Blindness Neg Hx     Cataracts Neg Hx     Diabetes Neg Hx     Glaucoma Neg Hx     Hypertension Neg Hx     Macular degeneration Neg Hx     Retinal detachment Neg Hx     Strabismus Neg Hx     Stroke Neg Hx     Thyroid disease Neg Hx     Celiac disease Neg Hx     Colon polyps Neg Hx     Cystic fibrosis Neg Hx     Crohn's disease Neg Hx     Inflammatory bowel disease Neg Hx     Liver cancer Neg Hx     Rectal cancer Neg Hx     Stomach cancer Neg Hx     Ulcerative colitis Neg Hx     Lymphoma Neg Hx      Social History     Tobacco Use    Smoking status: Never    Smokeless tobacco: Never   Substance Use Topics    Alcohol use: Never    Drug use: No       Mental Status: Oriented x3, Engaged    ECOG Performance Status Grade: 4 - Completely disabled    Review of Systems:  Positive for fatigue, back pain, leg pain.    Review of  Symptoms      Symptom Assessment (ESAS 0-10 Scale)  Pain:  0  Dyspnea:  0  Anxiety:  0  Nausea:  0  Depression:  0  Anorexia:  0  Fatigue:  0  Insomnia:  0  Restlessness:  0  Agitation:  0     CAM / Delirium:  Negative  Constipation:  Positive  Diarrhea:  Negative      ECOG Performance Status thGthrthathdtheth:th th5th Living Arrangements:  Lives with family and Lives in home    Psychosocial/Cultural:   See Palliative Psychosocial Note: No  Social Issues Identified: no  Bereavement Risk: non  Caregiver Needs Discussed. Caregiver Distress: no  Cultural: none  **Primary  to Follow**  Palliative Care  Consult: No    Spiritual:  F - Mindi and Belief:  Alevism  I - Importance:  Low  C - Community:  None active  A - Address in Care:  [facility]     Time-Based Charting:  Yes  Chart Review: 34 minutes  Face to Face: 12 minutes  Symptom Assessment: 9 minutes  Coordination of Care: 11 minutes  Discharge Plannin minutes    Total Time Spent: 75 minutes    Advance Care Planning   Advance Directives:   Living Will: No    LaPOST: No    Do Not Resuscitate Status: No    Medical Power of : No    Agent's Name:  Dangelo Hawley (spouse)   Agent's Contact Number:  [phone]    Decision Making:  Patient answered questions  Goals of Care: The patient endorses that what is most important right now is to focus on symptom/pain control and extending life as long as possible, even it it means sacrificing quality    Accordingly, we have decided that the best plan to meet the patient's goals includes continuing with treatment       OBJECTIVE:     Physical Exam  Constitutional:       Appearance: She is ill-appearing.   HENT:      Head: Normocephalic and atraumatic.      Mouth/Throat:      Mouth: Mucous membranes are moist.      Pharynx: Oropharynx is clear.   Eyes:      Extraocular Movements: Extraocular movements intact.      Comments: Pupils pinpoint   Cardiovascular:      Rate and Rhythm: Regular rhythm.  "Bradycardia present.      Pulses: Normal pulses.      Heart sounds: Normal heart sounds. No murmur heard.    No friction rub. No gallop.   Pulmonary:      Effort: Pulmonary effort is normal. No respiratory distress.      Breath sounds: Examination of the right-lower field reveals decreased breath sounds. Examination of the left-lower field reveals decreased breath sounds. Decreased breath sounds present. No wheezing.   Abdominal:      General: Abdomen is flat. Bowel sounds are decreased.      Palpations: Abdomen is soft.   Musculoskeletal:      Right lower leg: Edema present.      Left lower leg: Edema present.   Skin:     Coloration: Skin is pale and sallow.   Neurological:      Mental Status: She is alert and oriented to person, place, and time. Mental status is at baseline.      Sensory: Sensory deficit (diminished fine touch of legs) present.      Motor: Weakness (flaccid paraplegia) present.     ASSESSMENT/PLAN:     67F with pmh of spinal injury c/b flaccid paraplegia, neurogenic bladder, neurogenic bowel exacerbated by chronic high dose intrathecal hydromorphone. Admitted to  service for recurrent compllicated UTI responsive to abx but with worsening bradycardia and hypotension without systemic hypoperfusion, not c/w shock state, suspicious for iatrogenic I/s/o recent polypharmacy. Palliative consulted for symptom mgmt.    Unclear basis for pt's recent worsening baseline pain, not opioid hyperalgesia as symptoms are localized. Recent increase in hydromorphone noted though timing and degree are unclear. Pt's abnormal vitals started after resuming "home" meds pt denies regularly taking.     Tizanidine d/cd by writer, Seroquel since d/c'd by primary. Trazodone remains active high dose at bedtime but not a new med.     Recommendations:  Medical: supportive care per ICU - abx, pressors  Symptom Management:   - DO NOT discontinue pt's hydromorphone pump, if other factors are ruled out a slow wean of the dose may " be necessary.  - No other opioids until vitals normalize  Psychosocial: isolated, very dependent due to severe deficits at baseline  Legal: spouse has HCPOA  Prognosis: unlimited, no terminal dx    Robert Stein MD  Hospice and Palliative Medicine  Palliative Care Pager: [phone]

## 2023-01-10 NOTE — HOSPITAL COURSE
"Ms. Hawley presented with septic shock due to catheter associated UTI from indwelling suprapubic catheter present on arrival.  Initially treated with IV Ancef while in house and transition to p.o. Keflex by time of discharge.  Course complicated by significant hypotension while in house, although able to be weaned off pressors.  Suspect partially related to chronic opioid use from pain pump.  Did do testing for adrenal insufficiency with inadequate cortisol response to cosyntropin stimulation.  Initiated on fludrocortisone and hydrocortisone at low doses.  Will refer to endocrinology as outpatient.  Of note, she did have hypercapnia during her stay in the ICU and used CPAP at night.  Possibly with undiagnosed sleep apnea.  Will need outpatient NIF PFT, MIP/MEP, and sleep study. She was evaluated by PT/ OT while in house, who recommend SNF placement.    BP (!) 107/52   Pulse (!) 49   Temp 98.1 °F (36.7 °C)   Resp 18   Ht 5' 7" (1.702 m)   Wt 103.5 kg (228 lb 2.8 oz)   LMP  (LMP Unknown)   SpO2 (!) 94%   BMI 35.74 kg/m²   Physical Exam  Vitals and nursing note reviewed.   Constitutional:       General: She is not in acute distress.     Appearance: She is obese.   HENT:      Head: Normocephalic and atraumatic.   Eyes:      Pupils: Pupils are equal, round, and reactive to light.   Cardiovascular:      Rate and Rhythm: Regular rhythm. Bradycardia present.      Pulses:           Dorsalis pedis pulses are 1+ on the right side and 1+ on the left side.      Heart sounds: Normal heart sounds.   Pulmonary:      Effort: Pulmonary effort is normal. No respiratory distress.      Breath sounds: Decreased breath sounds present.   Abdominal:      General: Bowel sounds are normal. There is no distension.      Palpations: Abdomen is soft.      Tenderness: There is no abdominal tenderness.   Genitourinary:     Comments: Suprapubic catheter  Musculoskeletal:      Right lower le+ Edema present.      Left lower le+ " Edema present.   Skin:     General: Skin is warm.   Neurological:      Mental Status: She is alert and oriented to person, place, and time.      Motor: Weakness present.   Psychiatric:         Mood and Affect: Mood normal.         Behavior: Behavior normal.         Thought Content: Thought content normal.         Judgment: Judgment normal.

## 2023-01-10 NOTE — RESPIRATORY THERAPY
Educated patient on benefits of wearing CPAP ordered QHS. Patient refusing to wear device, no respiratory distress noted. RN notified of refusal.

## 2023-01-10 NOTE — ASSESSMENT & PLAN NOTE
- history of CAD with LAD with R-L collaterals  - reports chest pain which appears to be related to chronic stable angina; no worsening complaints per patient  - physical limitations present  - recommend medical management although limited given hematuria; ASA when cleared by Urology; recommend statin but no BB given baseline bradycardia; could use Imdur starting at 30 mg if BP tolerates in attempts to treat chronic stable angina  - echo today; no plans for invasive treatment at this time

## 2023-01-10 NOTE — PLAN OF CARE
Problem: Adult Inpatient Plan of Care  Goal: Plan of Care Review  Outcome: Ongoing, Progressing  Goal: Patient-Specific Goal (Individualized)  Outcome: Ongoing, Progressing  Goal: Absence of Hospital-Acquired Illness or Injury  Outcome: Ongoing, Progressing  Goal: Optimal Comfort and Wellbeing  Outcome: Ongoing, Progressing     Problem: Fall Injury Risk  Goal: Absence of Fall and Fall-Related Injury  Outcome: Ongoing, Progressing     Problem: UTI (Urinary Tract Infection)  Goal: Improved Infection Symptoms  Outcome: Ongoing, Progressing     Problem: Electrolyte Imbalance  Goal: Electrolyte Balance  Outcome: Ongoing, Progressing     Problem: Heart Failure Comorbidity  Goal: Maintenance of Heart Failure Symptom Control  Outcome: Ongoing, Progressing     Problem: Obstructive Sleep Apnea Risk or Actual Comorbidity Management  Goal: Unobstructed Breathing During Sleep  Outcome: Ongoing, Progressing     Problem: Pain Chronic (Persistent) (Comorbidity Management)  Goal: Acceptable Pain Control and Functional Ability  Outcome: Ongoing, Progressing

## 2023-01-10 NOTE — CARE UPDATE
Palliative Medicine Quick Note    Full consult to follow - briefly, called for pain control with recent episodes of bradycardia and hypotension precluding use of pt's home PRN [place] for breakthrough pains.    Noted pt was ordered for standing tizanidine, pt does not believe she takes at home (though appears in home meds) and this med has clonidine-like effects potentially driving recent low BP and HR. Discontinued by writer.    Pt is being stepped up to ICU for closer monitoring, has complex needs and capacity supporting PCA use. Per discussion with primary NP Peewee:    Hydromorphone PCA ordered by writer with params:  - NO loading dose or basal rate  - 0.2mg IV / push with 12 minute lockout time = max 1mg IV dilaudid per hour    This approximates 25% of pt's usual hourly dose and is appropriate for an acute pain episode with close monitoring. Maintain PRN narcan.    Robert Stein MD  Hospice and Palliative Medicine  Palliative Care Pager: [phone]

## 2023-01-10 NOTE — ASSESSMENT & PLAN NOTE
· History of CAD with LAD with R-L collaterals  · Continue statin and ASA  · No hematuria present so will continue ASA  · Patient having off and on CP  · Trops negative and EKG shows no ischemic changes  · Cardiology following - see recs  · recommend medical management although limited given hematuria; ASA when cleared by Urology; recommend statin but no BB given baseline bradycardia; could use Imdur starting at 30 mg but will hold off given marginal BP and pressor use; currently chest pain free   · echo yesterday with EF 50-55%; no need for further cardiac workup at this time; recommend cardiology follow up as an outpatient

## 2023-01-10 NOTE — ASSESSMENT & PLAN NOTE
· Lasix currently o/h in the setting of hypotension  · Echo pending  · Monitor  · 1.8L fluid restriction

## 2023-01-10 NOTE — HOSPITAL COURSE
1/10/2023 per HPI   1/11/2023 Transferred to ICU yesterday due to SBP 88 and HR down to 38. On IV Levophed currently with stable BP. HR 39-50 overnight. SB noted on telemetry with 2 second pause not overly concerning. Blood culture pending. Urine culture with prelim results with staph

## 2023-01-11 ENCOUNTER — TELEPHONE (OUTPATIENT)
Dept: UROLOGY | Facility: CLINIC | Age: 68
End: 2023-01-11
Payer: MEDICAID

## 2023-01-11 LAB
ALLENS TEST: ABNORMAL
ANION GAP SERPL CALC-SCNC: 8 MMOL/L (ref 8–16)
BACTERIA UR CULT: ABNORMAL
BASOPHILS # BLD AUTO: 0.02 K/UL (ref 0–0.2)
BASOPHILS NFR BLD: 0.5 % (ref 0–1.9)
BUN SERPL-MCNC: 7 MG/DL (ref 8–23)
CALCIUM SERPL-MCNC: 8.9 MG/DL (ref 8.7–10.5)
CHLORIDE SERPL-SCNC: 100 MMOL/L (ref 95–110)
CO2 SERPL-SCNC: 31 MMOL/L (ref 23–29)
CREAT SERPL-MCNC: 1 MG/DL (ref 0.5–1.4)
DIFFERENTIAL METHOD: ABNORMAL
EOSINOPHIL # BLD AUTO: 0.3 K/UL (ref 0–0.5)
EOSINOPHIL NFR BLD: 7 % (ref 0–8)
ERYTHROCYTE [DISTWIDTH] IN BLOOD BY AUTOMATED COUNT: 15.6 % (ref 11.5–14.5)
EST. GFR  (NO RACE VARIABLE): >60 ML/MIN/1.73 M^2
GLUCOSE SERPL-MCNC: 100 MG/DL (ref 70–110)
HCO3 UR-SCNC: 33.2 MMOL/L (ref 24–28)
HCT VFR BLD AUTO: 33 % (ref 37–48.5)
HGB BLD-MCNC: 9.7 G/DL (ref 12–16)
IMM GRANULOCYTES # BLD AUTO: 0.01 K/UL (ref 0–0.04)
IMM GRANULOCYTES NFR BLD AUTO: 0.2 % (ref 0–0.5)
LYMPHOCYTES # BLD AUTO: 1.2 K/UL (ref 1–4.8)
LYMPHOCYTES NFR BLD: 29.2 % (ref 18–48)
MAGNESIUM SERPL-MCNC: 2 MG/DL (ref 1.6–2.6)
MCH RBC QN AUTO: 24.7 PG (ref 27–31)
MCHC RBC AUTO-ENTMCNC: 29.4 G/DL (ref 32–36)
MCV RBC AUTO: 84 FL (ref 82–98)
MONOCYTES # BLD AUTO: 0.4 K/UL (ref 0.3–1)
MONOCYTES NFR BLD: 9.4 % (ref 4–15)
NEUTROPHILS # BLD AUTO: 2.2 K/UL (ref 1.8–7.7)
NEUTROPHILS NFR BLD: 53.7 % (ref 38–73)
NRBC BLD-RTO: 0 /100 WBC
PCO2 BLDA: 53.9 MMHG (ref 35–45)
PH SMN: 7.4 [PH] (ref 7.35–7.45)
PLATELET # BLD AUTO: 201 K/UL (ref 150–450)
PMV BLD AUTO: 9.8 FL (ref 9.2–12.9)
PO2 BLDA: 57 MMHG (ref 80–100)
POC BE: 8 MMOL/L
POC SATURATED O2: 88 % (ref 95–100)
POC TCO2: 35 MMOL/L (ref 23–27)
POCT GLUCOSE: 170 MG/DL (ref 70–110)
POTASSIUM SERPL-SCNC: 4.3 MMOL/L (ref 3.5–5.1)
RBC # BLD AUTO: 3.93 M/UL (ref 4–5.4)
SAMPLE: ABNORMAL
SITE: ABNORMAL
SODIUM SERPL-SCNC: 139 MMOL/L (ref 136–145)
T3FREE SERPL-MCNC: <1.5 PG/ML (ref 2.3–4.2)
WBC # BLD AUTO: 4.14 K/UL (ref 3.9–12.7)

## 2023-01-11 PROCEDURE — 25000003 PHARM REV CODE 250: Mod: HCNC | Performed by: STUDENT IN AN ORGANIZED HEALTH CARE EDUCATION/TRAINING PROGRAM

## 2023-01-11 PROCEDURE — 97530 THERAPEUTIC ACTIVITIES: CPT | Mod: HCNC

## 2023-01-11 PROCEDURE — 25000003 PHARM REV CODE 250: Mod: HCNC | Performed by: NURSE PRACTITIONER

## 2023-01-11 PROCEDURE — 36415 COLL VENOUS BLD VENIPUNCTURE: CPT | Mod: HCNC | Performed by: FAMILY MEDICINE

## 2023-01-11 PROCEDURE — 20000000 HC ICU ROOM: Mod: HCNC

## 2023-01-11 PROCEDURE — 63600175 PHARM REV CODE 636 W HCPCS: Mod: HCNC | Performed by: INTERNAL MEDICINE

## 2023-01-11 PROCEDURE — 99233 PR SUBSEQUENT HOSPITAL CARE,LEVL III: ICD-10-PCS | Mod: HCNC,25,, | Performed by: NURSE PRACTITIONER

## 2023-01-11 PROCEDURE — 63600175 PHARM REV CODE 636 W HCPCS: Mod: HCNC | Performed by: STUDENT IN AN ORGANIZED HEALTH CARE EDUCATION/TRAINING PROGRAM

## 2023-01-11 PROCEDURE — 99232 PR SUBSEQUENT HOSPITAL CARE,LEVL II: ICD-10-PCS | Mod: HCNC,GC,, | Performed by: INTERNAL MEDICINE

## 2023-01-11 PROCEDURE — 36415 COLL VENOUS BLD VENIPUNCTURE: CPT | Mod: HCNC | Performed by: STUDENT IN AN ORGANIZED HEALTH CARE EDUCATION/TRAINING PROGRAM

## 2023-01-11 PROCEDURE — 93010 EKG 12-LEAD: ICD-10-PCS | Mod: HCNC,,, | Performed by: INTERNAL MEDICINE

## 2023-01-11 PROCEDURE — 63600175 PHARM REV CODE 636 W HCPCS: Mod: HCNC | Performed by: NURSE PRACTITIONER

## 2023-01-11 PROCEDURE — 80048 BASIC METABOLIC PNL TOTAL CA: CPT | Mod: HCNC | Performed by: STUDENT IN AN ORGANIZED HEALTH CARE EDUCATION/TRAINING PROGRAM

## 2023-01-11 PROCEDURE — 93005 ELECTROCARDIOGRAM TRACING: CPT | Mod: HCNC

## 2023-01-11 PROCEDURE — 25000003 PHARM REV CODE 250: Mod: HCNC | Performed by: FAMILY MEDICINE

## 2023-01-11 PROCEDURE — 84481 FREE ASSAY (FT-3): CPT | Mod: HCNC | Performed by: FAMILY MEDICINE

## 2023-01-11 PROCEDURE — 36600 WITHDRAWAL OF ARTERIAL BLOOD: CPT | Mod: HCNC

## 2023-01-11 PROCEDURE — 85025 COMPLETE CBC W/AUTO DIFF WBC: CPT | Mod: HCNC | Performed by: STUDENT IN AN ORGANIZED HEALTH CARE EDUCATION/TRAINING PROGRAM

## 2023-01-11 PROCEDURE — 97168 OT RE-EVAL EST PLAN CARE: CPT | Mod: HCNC

## 2023-01-11 PROCEDURE — 93010 ELECTROCARDIOGRAM REPORT: CPT | Mod: HCNC,,, | Performed by: INTERNAL MEDICINE

## 2023-01-11 PROCEDURE — 82803 BLOOD GASES ANY COMBINATION: CPT | Mod: HCNC

## 2023-01-11 PROCEDURE — 99232 SBSQ HOSP IP/OBS MODERATE 35: CPT | Mod: HCNC,GC,, | Performed by: INTERNAL MEDICINE

## 2023-01-11 PROCEDURE — 83735 ASSAY OF MAGNESIUM: CPT | Mod: HCNC | Performed by: STUDENT IN AN ORGANIZED HEALTH CARE EDUCATION/TRAINING PROGRAM

## 2023-01-11 PROCEDURE — 97164 PT RE-EVAL EST PLAN CARE: CPT | Mod: HCNC

## 2023-01-11 PROCEDURE — 99233 SBSQ HOSP IP/OBS HIGH 50: CPT | Mod: HCNC,25,, | Performed by: INTERNAL MEDICINE

## 2023-01-11 PROCEDURE — 99900035 HC TECH TIME PER 15 MIN (STAT): Mod: HCNC

## 2023-01-11 PROCEDURE — 99233 PR SUBSEQUENT HOSPITAL CARE,LEVL III: ICD-10-PCS | Mod: HCNC,25,, | Performed by: INTERNAL MEDICINE

## 2023-01-11 PROCEDURE — 25000003 PHARM REV CODE 250: Mod: HCNC | Performed by: INTERNAL MEDICINE

## 2023-01-11 PROCEDURE — 99233 SBSQ HOSP IP/OBS HIGH 50: CPT | Mod: HCNC,25,, | Performed by: NURSE PRACTITIONER

## 2023-01-11 RX ORDER — CEFAZOLIN SODIUM 2 G/50ML
2 SOLUTION INTRAVENOUS
Status: COMPLETED | OUTPATIENT
Start: 2023-01-11 | End: 2023-01-16

## 2023-01-11 RX ORDER — LEVOTHYROXINE SODIUM 75 UG/1
150 TABLET ORAL
Status: DISCONTINUED | OUTPATIENT
Start: 2023-01-12 | End: 2023-01-11

## 2023-01-11 RX ORDER — NOREPINEPHRINE BITARTRATE/D5W 4MG/250ML
0-3 PLASTIC BAG, INJECTION (ML) INTRAVENOUS CONTINUOUS
Status: DISCONTINUED | OUTPATIENT
Start: 2023-01-11 | End: 2023-01-13

## 2023-01-11 RX ADMIN — QUETIAPINE FUMARATE 25 MG: 25 TABLET ORAL at 08:01

## 2023-01-11 RX ADMIN — ASPIRIN 81 MG: 81 TABLET, COATED ORAL at 08:01

## 2023-01-11 RX ADMIN — DOCUSATE SODIUM - SENNOSIDES 1 TABLET: 50; 8.6 TABLET, FILM COATED ORAL at 08:01

## 2023-01-11 RX ADMIN — MUPIROCIN: 20 OINTMENT TOPICAL at 08:01

## 2023-01-11 RX ADMIN — SODIUM CHLORIDE 250 ML: 0.9 INJECTION, SOLUTION INTRAVENOUS at 01:01

## 2023-01-11 RX ADMIN — ENOXAPARIN SODIUM 40 MG: 40 INJECTION SUBCUTANEOUS at 05:01

## 2023-01-11 RX ADMIN — PANTOPRAZOLE SODIUM 40 MG: 40 TABLET, DELAYED RELEASE ORAL at 08:01

## 2023-01-11 RX ADMIN — CEFAZOLIN SODIUM 2 G: 2 SOLUTION INTRAVENOUS at 10:01

## 2023-01-11 RX ADMIN — TRAZODONE HYDROCHLORIDE 300 MG: 100 TABLET ORAL at 08:01

## 2023-01-11 RX ADMIN — LEVOTHYROXINE SODIUM 137 MCG: 25 TABLET ORAL at 05:01

## 2023-01-11 RX ADMIN — ATORVASTATIN CALCIUM 80 MG: 40 TABLET, FILM COATED ORAL at 08:01

## 2023-01-11 RX ADMIN — PHENYLEPHRINE HYDROCHLORIDE 0.5 MCG/KG/MIN: 10 INJECTION INTRAVENOUS at 01:01

## 2023-01-11 RX ADMIN — FLUOXETINE 60 MG: 20 CAPSULE ORAL at 08:01

## 2023-01-11 RX ADMIN — Medication 0.02 MCG/KG/MIN: at 08:01

## 2023-01-11 RX ADMIN — LINEZOLID 600 MG: 600 INJECTION, SOLUTION INTRAVENOUS at 04:01

## 2023-01-11 RX ADMIN — POTASSIUM CHLORIDE 20 MEQ: 1500 TABLET, EXTENDED RELEASE ORAL at 08:01

## 2023-01-11 RX ADMIN — PIPERACILLIN AND TAZOBACTAM 4.5 G: 4; .5 INJECTION, POWDER, LYOPHILIZED, FOR SOLUTION INTRAVENOUS; PARENTERAL at 03:01

## 2023-01-11 RX ADMIN — POLYETHYLENE GLYCOL 3350 17 G: 17 POWDER, FOR SOLUTION ORAL at 08:01

## 2023-01-11 RX ADMIN — SODIUM CHLORIDE: 9 INJECTION, SOLUTION INTRAVENOUS at 04:01

## 2023-01-11 RX ADMIN — CEFAZOLIN SODIUM 2 G: 2 SOLUTION INTRAVENOUS at 05:01

## 2023-01-11 NOTE — PT/OT/SLP RE-EVAL
Physical Therapy Re-evaluation and Treatment    Patient Name:  Tasha Hawley   MRN:  [id number]    Recommendations:     Discharge Recommendations:  (TBD)  Discharge Equipment Recommendations:  (TBD)   Barriers to discharge:  OH, increased assist needed, medically unstable, fall risk    Assessment:     Tasha Hawley is a 67 y.o. female admitted with a medical diagnosis of UTI (urinary tract infection).  She presents with the following impairments/functional limitations: weakness, gait instability, impaired endurance, impaired balance, impaired self care skills, impaired functional mobility, impaired sensation, impaired cognition, decreased coordination, pain, decreased lower extremity function, decreased upper extremity function, decreased safety awareness, edema, decreased ROM, impaired joint extensibility, impaired cardiopulmonary response to activity .Pt orthostatic and symptomatic during session. BP supine: 110/50, seated EOB: 94/52, seated after ~1-2 mins: 91/54, supine: 140/61, and supine after several minutes: 112/58. Upon returning supine, pt c/o of chest pain, decreased sensation to L UE/LE and L side of face initially, and B LE spasms/cramps. Nsg notified immediately and critical care MD in room to assess pt at end of session. HR in 40s at rest and increased to 60s with activity. D/c recs TBD pending progress and medical stability.     Rehab Prognosis:  Fair +; patient would benefit from acute skilled PT services to address these deficits and reach maximum level of function.      Recent Surgery: * No surgery found *      Plan:     During this hospitalization, patient to be seen 5 x/week to address the above listed problems via gait training, therapeutic activities, therapeutic exercises, neuromuscular re-education  Plan of Care Expires:  02/09/23  Plan of Care Reviewed with: patient    Subjective     Communicated with nsg prior to session.  Patient found HOB elevated with telemetry, oxygen, pulse ox  (continuous), blood pressure cuff, SCD, peripheral IV (suprapubic catheter) upon PT entry to room, agreeable to evaluation.      Chief Complaint: B pelvis and LE pain  Patient comments/goals: Pt agreeable to participate in therapy  Pain/Comfort:  Pain Rating 1: 8/10  Location - Side 1: Bilateral  Location 1: leg (and pelvis)  Pain Addressed 1: Reposition, Distraction, Cessation of Activity, Nurse notified  Pain Rating Post-Intervention 1:  (not rated, but pain still present)  Pain Rating 2: 0/10  Location 2: chest (L shld)  Pain Addressed 2: Reposition, Distraction, Cessation of Activity, Nurse notified  Pain Rating Post-Intervention 2:  (8.5 - 9/10)    Patients cultural, spiritual, Orthodoxy conflicts given the current situation: no      Objective:     Patient found with: telemetry, oxygen, pulse ox (continuous), blood pressure cuff, SCD, peripheral IV (suprapubic catheter)     General Precautions: Standard, fall, hearing impaired  Orthopedic Precautions: N/A  Braces: N/A  Respiratory Status: Nasal cannula, flow 2 L/min    Cognitive Exam:  Patient is oriented WF  Postural Exam:  Patient presented with the following abnormalities:    -       Rounded shoulders  -       Forward head  -       Kyphosis  Sensation:    -       Impaired  pt reports numbness/tingling to BLE and decreased sensation in B feet  Skin Integrity/Edema:      -       Skin integrity: redness B LE, healed scar medial L foot  -       Edema: Pitting BLE  RLE ROM: Limited ankle mobility DF to ~neutral; knee PROM WFL, AROM limited by weakness by ~50%  RLE Strength: grossly 2+ to 3-/5; hip NT but pt able flex hip to perform heel slide  LLE ROM: Limited ankle mobility DF to ~neutral;  knee PROM WFL, AROM limited by weakness by ~50%, hip NT but pt able to flex hip  to perform heel slide  LLE Strength: grossly 2+ to 3-/5    Functional Mobility:  Bed Mobility:     Rolling Left:  stand by assistance and contact guard assistance  Rolling Right: stand by  "assistance and contact guard assistance  Scooting: stand by assistance and contact guard assistance  Bridging: stand by assistance and contact guard assistance  Supine to Sit: moderate assistance and of 2 persons  Sit to Supine: maximal assistance and of 2 persons    AM-PAC 6 CLICK MOBILITY  Total Score:9       Treatment and Education:   Pt cleared by Dr. Garcia and nurse for therapy.  Pt orthostatic and symptomatic during session.   Pt sat EOB with CGA-Maureen and pt reporting light-headedness which did not resolve with time and LE exercises (~ 10 reps ea AP and LAQs BLE)  While sitting EOB, pt presenting with light-headedness and change in mentation: slurring speech, confusion, decreased command following, and somewhat restless.   Pt also with possible hallucinations and reporting seeing her mother and looking towards R side and up towards ceiling. Pt reporting to daughter in room: "that's Mawmaw"  BP supine: 110/50, immediately seated EOB: 94/52, seated after ~1-2 mins: 91/54, supine initially elevated possibly due to increased pain and LE muscle cramps/spasms: 140/61, and supine after several minutes: 112/58.   Upon returning supine, pt c/o of chest pain and L shld pain, decreased sensation to L UE/LE and L side of face initially, and B LE spasms/cramps.   Facial numbness resolved quickly and B LE spasms slowly resolved.  Pt reporting "I don't want to die"  Instructed pt on PLB  Nsg notified immediately of symptoms and critical care MD in room to assess pt at end of session.   HR in 40s at rest and increased to 60s with activity.  SCD's replaced.     Patient left HOB elevated with all lines intact, call button in reach, nsg and MD notified, and nsg and MD present.    GOALS:   Multidisciplinary Problems       Physical Therapy Goals          Problem: Physical Therapy    Goal Priority Disciplines Outcome Goal Variances Interventions   Physical Therapy Goal     PT, PT/OT Ongoing, Progressing     Description: Goals to " be met by: 23     Patient will increase functional independence with mobility by performin. Supine to sit with Stand-by Assistance  2. Sit to supine with Stand-by Assistance  3. Sit to stand transfer with Stand-by Assistance  4. Bed to chair transfer with Stand-by Assistance using Rolling Walker  5. Gait  x 50 feet with Stand-by Assistance using Rolling Walker.                          History:     Past Medical History:   Diagnosis Date    Anticoagulant long-term use     Anxiety     Arthritis     Bilateral lower extremity edema     severe chronic    Carotid artery occlusion     Cataract     CHF (congestive heart failure)     Coronary artery disease     subtotalled LAD with collateral    Depression     Fever blister     Hard of hearing     Hypokalemia 2023    Hyponatremia 2022    Hypothyroid     Iron deficiency anemia     Lumbar radiculopathy     with chronic pain    Ocular migraine     Renal disorder     Sleep apnea     cpap       Past Surgical History:   Procedure Laterality Date    ADENOIDECTOMY      BACK SURGERY      x 12    CARDIAC CATHETERIZATION  2016    subtotalled LAD with right to left collaterals    CATARACT EXTRACTION W/  INTRAOCULAR LENS IMPLANT Left     Dr Coleman     CYSTOSCOPIC LITHOLAPAXY N/A 2019    Procedure: CYSTOLITHOLAPAXY;  Surgeon: Shireen Mayo MD;  Location: [facility] OR [address] [place] [address];  Service: Urology;  Laterality: N/A;    CYSTOSCOPIC LITHOLAPAXY N/A 9/3/2019    Procedure: CYSTOLITHOLAPAXY;  Surgeon: Shireen Mayo MD;  Location: [facility] OR 2ND FLR;  Service: Urology;  Laterality: N/A;    CYSTOSCOPY N/A 2021    Procedure: CYSTOSCOPY;  Surgeon: Shireen Mayo MD;  Location: [facility] OR 1ST FLR;  Service: Urology;  Laterality: N/A;    CYSTOSCOPY  2021    Procedure: CYSTOSCOPY;  Surgeon: Shireen Mayo MD;  Location: [facility] OR 1ST FLR;  Service: Urology;;    CYSTOSCOPY  2022    Procedure: CYSTOSCOPY;  Surgeon: Shireen Mayo MD;  Location: [facility] OR [address] [place] [address];   Service: Urology;;    CYSTOSCOPY WITH INJECTION OF PERIURETHRAL BULKING AGENT  7/19/2022    Procedure: CYSTOSCOPY, WITH PERIURETHRAL BULKING AGENT INJECTION-MACROPLASTIQUE;  Surgeon: Shireen Mayo MD;  [facility];  Service: Urology;;    CYSTOSCOPY,WITH BOTULINUM TOXIN INJECTION N/A 12/13/2022    Procedure: CYSTOSCOPY,WITH BOTULINUM TOXIN INJECTION;  Surgeon: Shireen Mayo MD;  [facility];  Service: Urology;  Laterality: N/A;  300 U    ESOPHAGOGASTRODUODENOSCOPY N/A 5/23/2018    Procedure: ESOPHAGOGASTRODUODENOSCOPY (EGD);  Surgeon: Prince Vance MD;  [facility]  Service: Endoscopy;  Laterality: N/A;  r/s 'd per Dr. Vance due to family emergency- ER    HYSTERECTOMY  1975    endometriosis    INJECTION OF BOTULINUM TOXIN TYPE A  7/13/2021    Procedure: INJECTION, BOTULINUM TOXIN, 200units;  Surgeon: Shireen Mayo MD;  [facility];  Service: Urology;;    INJECTION OF BOTULINUM TOXIN TYPE A  11/16/2021    Procedure: INJECTION, BOTULINUM TOXIN, 200units;  Surgeon: Shireen Mayo MD;  [facility];  Service: Urology;;    INJECTION OF BOTULINUM TOXIN TYPE A  7/19/2022    Procedure: INJECTION, BOTULINUM TOXIN, 300 units ;  Surgeon: Shireen Mayo MD;  [facility];  Service: Urology;;    INSERTION, SUPRAPUBIC CATHETER N/A 12/13/2022    Procedure: INSERTION, SUPRAPUBIC CATHETER;  Surgeon: Shireen Mayo MD;  [facility];  Service: Urology;  Laterality: N/A;  exchange    pain pump placement      SQ Dilaudid Pump managed by Dr. Hillman, Pain Management    REMOVAL OF BONE SPUR OF FOOT Bilateral 9/16/2022    Procedure: EXCISION ARTHRITIC BONE, BILATERAL FOOT;  Surgeon: Adam Mcguire DPM;  Location: [facility];  Service: Podiatry;  Laterality: Bilateral;    REPLACEMENT OF CATHETER N/A 10/31/2019    Procedure: REPLACEMENT, CATHETER-SUPRAPUBIC;  Surgeon: Shireen Mayo MD;  Location: [facility] OR  1ST FLR;  Service: Urology;  Laterality: N/A;    SPINAL CORD STIMULATOR REMOVAL      before Larissa    SPINE SURGERY  5-13-13    CERVICAL FUSION    TONSILLECTOMY         Time Tracking:     PT Received On: 01/11/23  PT Start Time: 1132     PT Stop Time: 1204  PT Total Time (min): 32 min     Billable Minutes: Re-eval 9 and Therapeutic Activity 23 (cotx with OT)      01/11/2023

## 2023-01-11 NOTE — ASSESSMENT & PLAN NOTE
- UA positive; urine culture with prelim results with staff; blood cultures pending  - on abx; further management per primary team

## 2023-01-11 NOTE — ASSESSMENT & PLAN NOTE
- history of CAD with LAD with R-L collaterals  - reported chest pain yesterday consistent with chronic stable angina; troponin negative   - unable to assess exertional symptoms due to physical limitations   - recommend medical management although limited given hematuria; ASA when cleared by Urology; recommend statin but no BB given baseline bradycardia; could use Imdur starting at 30 mg but will hold off given marginal BP and pressor use; currently chest pain free   - echo yesterday with EF 50-55%; no need for further cardiac workup at this time; recommend cardiology follow up as an outpatient

## 2023-01-11 NOTE — PLAN OF CARE
The pt's current with [facility] so the sw faxed the pt's faceheet to them and informed them of the hospital admit. The previous sw faxed them info as well.        01/11/23 1634   Post-Acute Status   Post-Acute Authorization Home Health   Home Health Status Referrals Sent

## 2023-01-11 NOTE — PT/OT/SLP RE-EVAL
Occupational Therapy   Re-evaluation    Name: Tasha Hawley  MRN: [id number]  Admitting Diagnosis:  UTI (urinary tract infection)  Recent Surgery: * No surgery found *      Recommendations:     Discharge Recommendations:  (TBD)  Discharge Equipment Recommendations:  (TBD)  Barriers to discharge:   (Pt requires increased assistance, medical status)    Assessment:     Tasha Hawley is a 67 y.o. female with a medical diagnosis of UTI (urinary tract infection).  She presents with deconditioning, orthostatic hypotension but fairly stable HR (40's-60's in session).  Performance deficits affecting function are weakness, impaired endurance, impaired sensation, impaired self care skills, impaired functional mobility, gait instability, decreased lower extremity function, decreased upper extremity function, impaired cognition, impaired balance, decreased safety awareness, pain, decreased ROM, impaired coordination, impaired fine motor, impaired muscle length, impaired joint extensibility, impaired cardiopulmonary response to activity, edema.      Rehab Prognosis:  Fair+/Good; patient would benefit from acute skilled OT services to address these deficits and reach maximum level of function.       Plan:     Patient to be seen 3 x/week to address the above listed problems via self-care/home management, therapeutic activities, therapeutic exercises  Plan of Care Expires: 02/09/23  Plan of Care Reviewed with: patient, spouse    Subjective     Chief Complaint: Pt reports dizziness in seated with increased dizziness, lightheadedness and nausea reported with continued static sitting. Mod/max v/c to maintain attention to task while seated required   Patient/Family stated goals: To return to PLOF  Communicated with: aminah prior to session.  Pain/Comfort:  Pain Rating 1: 8/10  Location - Side 1: Bilateral  Location - Orientation 1: generalized  Location 1: leg  Pain Addressed 1: Reposition, Distraction, Cessation of Activity, Nurse  notified  Pain Rating Post-Intervention 1:  (not rated, but pain still present. c/o cramping at end of session)  Pain Rating 2: 0/10  Location 2: chest  Pain Addressed 2: Reposition, Distraction, Cessation of Activity, Nurse notified, Other (see comments) (MD presented to assess pt)  Pain Rating Post-Intervention 2:  (8.5 - 9/10)    Objective:     Communicated with: nsg prior to session.  Patient found HOB elevated with: blood pressure cuff, telemetry, peripheral IV, oxygen, pulse ox (continuous), SCD (suprapubic catheter) upon OT entry to room.    General Precautions: Standard, hearing impaired, fall  Orthopedic Precautions: N/A  Braces: N/A  Respiratory Status: Nasal cannula, flow 2 L/min    Occupational Performance:    Bed Mobility:    Patient completed Rolling/Turning to Right with stand by assistance and contact guard assistance  Patient completed Scooting/Bridging with stand by assistance and contact guard assistance  Patient completed Supine to Sit with moderate assistance and 2 persons  Patient completed Sit to Supine with maximal assistance and 2 persons    Functional Mobility/Transfers:  OOB not attempted 2/2 decreased SHAVON, reports of lightheadedness/nausea/dizziness while seated EOB  Functional Mobility: Pt with fair static seated balance. CGA to min A for static and dynamic seated balance, though limited tolerance for seated EOB and pt assisted to return to supine     Activities of Daily Living:  Upper Body Dressing: moderate assistance and maximal assistance to don gown as robe seated EOB 2/2 multiple lines  Lower Body Dressing: total assistance to don/doff B socks supine in bed    Cognitive/Visual Perceptual:  Cognitive/Psychosocial Skills:     -       Oriented to: Person, Place, Time and Situation   -       Follows Commands/attention:Follows multistep  commands  -       Communication: clear/fluent  -       Memory: No Deficits noted  -       Safety awareness/insight to disability: intact   -        "Mood/Affect/Coping skills/emotional control: Appropriate to situation    Physical Exam:  Postural examination/scapula alignment:    -      Rounded shoulders, forward head, kyphosis, slight L shoulder depressed  - Skin integrity: redness BLE, healed scar medial L foot     Edema: Pitting BLE  Sensation:    -       Initially reported h/o BLE neuropathy but at end of session reported L facial and LUE/LLE decreased sensation; once nsg and MD arrived to assess, pt reporting L facial sensation no longer decreased but continued LUE/LE sensation deficits  Motor Planning:    -       WFL  Dominant hand:    -       R handed  Upper Extremity Range of Motion: BUE WFL     Upper Extremity Strength:  BUE grossly 4-/5   Strength:  B hands WFL  Fine Motor Coordination:    -       Intact  Gross motor coordination:   WFL     AMPAC 6 Click:  AMPA Total Score: 15    Treatment & Education:  Pt educated on role of OT and POC.   Pt performing skills as listed above.   Pt reported chest pain 8.5-9/10 and L sided decreased sensation at end of PT/OT session, nsg and MD presented promptly to attend to pt. No strength deficits noted.   Pt  looking up towards right and stating, "That's Mawmaw." After return to supine at end of session.  Pt with dizziness in seated, BP decreased and standing not attempted this session 2/2 concerns for continued orthostatic hypotension. HR 40-60 in session.  BP taken as follows:    BP HR MAP   Supine 110/50 44 72   Seated 94/52 49 71   Seated ~1-2 minutes 91/54 50 70   Return to Supine 140/61 46 88   Supine ~5 minutes 112/58 42 79        Patient left HOB elevated with all lines intact, call button in reach, nsg notified, and MD and family present    GOALS:   Multidisciplinary Problems       Occupational Therapy Goals          Problem: Occupational Therapy    Goal Priority Disciplines Outcome Interventions   Occupational Therapy Goal     OT, PT/OT Ongoing, Progressing    Description: Goals to be met by: " 02/09/2023      Patient will increase functional independence with ADLs by performing:    UE Dressing with Supervision.  LE Dressing with Minimal Assistance.  Grooming while standing with Stand-by Assistance.  Toileting from bedside commode or toilet with Contact Guard Assistance for hygiene and clothing management.   Toilet transfer to bedside commode or toilet with Contact Guard Assistance.  Increased functional strength to WFL for self care.  Upper extremity exercise program x10 reps per handout, with independence.                          History:     Past Medical History:   Diagnosis Date    Anticoagulant long-term use     Anxiety     Arthritis     Bilateral lower extremity edema     severe chronic    Carotid artery occlusion     Cataract     CHF (congestive heart failure)     Coronary artery disease     subtotalled LAD with collateral    Depression     Fever blister     Hard of hearing     Hypokalemia 1/9/2023    Hyponatremia 2/4/2022    Hypothyroid     Iron deficiency anemia     Lumbar radiculopathy     with chronic pain    Ocular migraine     Renal disorder     Sleep apnea     cpap         Past Surgical History:   Procedure Laterality Date    ADENOIDECTOMY      BACK SURGERY      x 12    CARDIAC CATHETERIZATION  2016    subtotalled LAD with right to left collaterals    CATARACT EXTRACTION W/  INTRAOCULAR LENS IMPLANT Left     Dr Coleman     CYSTOSCOPIC LITHOLAPAXY N/A 6/27/2019    Procedure: CYSTOLITHOLAPAXY;  Surgeon: Shireen Mayo MD;  Location: [facility] OR [address] [place] [address];  Service: Urology;  Laterality: N/A;    CYSTOSCOPIC LITHOLAPAXY N/A 9/3/2019    Procedure: CYSTOLITHOLAPAXY;  Surgeon: Shireen Mayo MD;  Location: [facility] OR [address] [place] [address];  Service: Urology;  Laterality: N/A;    CYSTOSCOPY N/A 7/13/2021    Procedure: CYSTOSCOPY;  Surgeon: Shireen Mayo MD;  Location: [facility] OR [address] [place] [address];  Service: Urology;  Laterality: N/A;    CYSTOSCOPY  11/16/2021    Procedure: CYSTOSCOPY;  Surgeon: Shireen Mayo MD;   Location: [facility] OR 1ST FLR;  Service: Urology;;    CYSTOSCOPY  7/19/2022    Procedure: CYSTOSCOPY;  Surgeon: Shireen Mayo MD;  [facility];  Service: Urology;;    CYSTOSCOPY WITH INJECTION OF PERIURETHRAL BULKING AGENT  7/19/2022    Procedure: CYSTOSCOPY, WITH PERIURETHRAL BULKING AGENT INJECTION-MACROPLASTIQUE;  Surgeon: Shireen Mayo MD;  [facility];  Service: Urology;;    CYSTOSCOPY,WITH BOTULINUM TOXIN INJECTION N/A 12/13/2022    Procedure: CYSTOSCOPY,WITH BOTULINUM TOXIN INJECTION;  Surgeon: Shireen Mayo MD;  [facility];  Service: Urology;  Laterality: N/A;  300 U    ESOPHAGOGASTRODUODENOSCOPY N/A 5/23/2018    Procedure: ESOPHAGOGASTRODUODENOSCOPY (EGD);  Surgeon: Prince Vance MD;  Location: [facility] (4TH FLR);  Service: Endoscopy;  Laterality: N/A;  r/s 'd per Dr. Vance due to family emergency- ER    HYSTERECTOMY  1975    endometriosis    INJECTION OF BOTULINUM TOXIN TYPE A  7/13/2021    Procedure: INJECTION, BOTULINUM TOXIN, 200units;  Surgeon: Shireen Mayo MD;  [facility];  Service: Urology;;    INJECTION OF BOTULINUM TOXIN TYPE A  11/16/2021    Procedure: INJECTION, BOTULINUM TOXIN, 200units;  Surgeon: Shireen Mayo MD;  [facility];  Service: Urology;;    INJECTION OF BOTULINUM TOXIN TYPE A  7/19/2022    Procedure: INJECTION, BOTULINUM TOXIN, 300 units ;  Surgeon: Shireen Mayo MD;  [facility];  Service: Urology;;    INSERTION, SUPRAPUBIC CATHETER N/A 12/13/2022    Procedure: INSERTION, SUPRAPUBIC CATHETER;  Surgeon: Shireen Mayo MD;  Location: [facility] OR [facility] FLR;  Service: Urology;  Laterality: N/A;  exchange    pain pump placement      SQ Dilaudid Pump managed by Dr. Hillman, Pain Management    REMOVAL OF BONE SPUR OF FOOT Bilateral 9/16/2022    Procedure: EXCISION ARTHRITIC BONE, BILATERAL FOOT;  Surgeon: Adam Mcguire DPM;  Location: [facility];  Service: Podiatry;   Laterality: Bilateral;    REPLACEMENT OF CATHETER N/A 10/31/2019    Procedure: REPLACEMENT, CATHETER-SUPRAPUBIC;  Surgeon: Shireen Mayo MD;  Location: [facility] OR [address] [place] [address];  Service: Urology;  Laterality: N/A;    SPINAL CORD STIMULATOR REMOVAL      before Larissa    SPINE SURGERY  5-13-13    CERVICAL FUSION    TONSILLECTOMY         Time Tracking:     OT Date of Treatment: 01/11/23  OT Start Time: 1132  OT Stop Time: 1205  OT Total Time (min): 33 min    Billable Minutes:Re-eval 16  Therapeutic Activity 17    1/11/2023

## 2023-01-11 NOTE — PROGRESS NOTES
[facility] - Intensive Care  Cardiology  Progress Note    Patient Name: Tasha Hawley  MRN: [id number]  Admission Date: 1/8/2023  Hospital Length of Stay: 1 days  Code Status: Full Code   Attending Physician: Justyna Hinton*   Primary Care Physician: Mesfin Hodges Ii, MD  Expected Discharge Date:   Principal Problem:UTI (urinary tract infection)    Subjective:     Hospital Course:   1/10/2023 per HPI   1/11/2023 Transferred to ICU yesterday due to SBP 88 and HR down to 38. On IV Levophed currently with stable BP. HR 39-50 overnight. SB noted on telemetry with 2 second pause not overly concerning. Blood culture pending. Urine culture with prelim results with staph           Review of Systems   Constitutional: Negative for chills, decreased appetite, diaphoresis and fever.   Cardiovascular:  Negative for chest pain, claudication, cyanosis, dyspnea on exertion, irregular heartbeat, leg swelling, near-syncope, orthopnea, palpitations, paroxysmal nocturnal dyspnea and syncope.   Respiratory:  Negative for cough, hemoptysis, shortness of breath and wheezing.    Gastrointestinal:  Negative for bloating, abdominal pain, constipation, diarrhea, melena, nausea and vomiting.   Neurological:  Negative for dizziness and weakness.   Objective:     Vital Signs (Most Recent):  Temp: 97.7 °F (36.5 °C) (01/11/23 1130)  Pulse: (!) 50 (01/11/23 1000)  Resp: 15 (01/11/23 1000)  BP: (!) 77/39 (01/11/23 1000)  SpO2: 99 % (01/11/23 1000)   Vital Signs (24h Range):  Temp:  [97 °F (36.1 °C)-98.2 °F (36.8 °C)] 97.7 °F (36.5 °C)  Pulse:  [37-62] 50  Resp:  [13-36] 15  SpO2:  [87 %-100 %] 99 %  BP: ()/(33-66) 77/39     Weight: 85.7 kg (189 lb)  Body mass index is 29.6 kg/m².     SpO2: 99 %         Intake/Output Summary (Last 24 hours) at 1/11/2023 1215  Last data filed at 1/11/2023 0639  Gross per 24 hour   Intake 1940.77 ml   Output 4085 ml   Net -2144.23 ml       Lines/Drains/Airways       Drain  Duration                   Suprapubic Catheter 12/13/22 100% silicone 20 Fr. 29 days              Line  Duration                  Subcutaneous Infusion Pump Abdomen (Comment) -- days              Peripheral Intravenous Line  Duration                  Peripheral IV - Single Lumen 01/09/23 0020 Right Antecubital 2 days         Peripheral IV - Single Lumen 01/10/23 1710 20 G Anterior;Right Upper Arm <1 day                    Physical Exam  Constitutional:       General: She is not in acute distress.     Appearance: She is well-developed.   Cardiovascular:      Rate and Rhythm: Normal rate and regular rhythm.      Heart sounds: No murmur heard.    No gallop.   Pulmonary:      Effort: Pulmonary effort is normal. No respiratory distress.      Breath sounds: Normal breath sounds. No wheezing.   Abdominal:      General: Bowel sounds are normal. There is no distension.      Palpations: Abdomen is soft.      Tenderness: There is no abdominal tenderness.   Skin:     General: Skin is warm and dry.   Neurological:      Mental Status: She is alert and oriented to person, place, and time.       Significant Labs: BMP:   Recent Labs   Lab 01/10/23  0334 01/11/23  0307   GLU 93 100    139   K 4.4 4.3    100   CO2 29 31*   BUN 10 7*   CREATININE 0.9 1.0   CALCIUM 8.5* 8.9   MG  --  2.0    and CBC   Recent Labs   Lab 01/09/23  2030 01/10/23  0334 01/11/23  0307   WBC 6.17 5.29 4.14   HGB 9.0* 8.5* 9.7*   HCT 29.7* 28.5* 33.0*    174 201       Significant Imaging: Echocardiogram: Transthoracic echo (TTE) complete ([facility] Only):   Results for orders placed or performed during the hospital encounter of 01/08/23   Echo   Result Value Ref Range    BSA 2.01 m2    TDI SEPTAL 0.10 m/s    LV LATERAL E/E' RATIO 9.55 m/s    LV SEPTAL E/E' RATIO 10.50 m/s    LA WIDTH 5.00 cm    IVC diameter 2.36 cm    Left Ventricular Outflow Tract Mean Velocity 0.50 cm/s    Left Ventricular Outflow Tract Mean Gradient 1.24 mmHg    Pulmonary Valve Mean Velocity 0.74 m/s     "TDI LATERAL 0.11 m/s    PV PEAK VELOCITY 0.92 cm/s    LVIDd 3.63 3.5 - 6.0 cm    IVS 1.25 (A) 0.6 - 1.1 cm    Posterior Wall 0.97 0.6 - 1.1 cm    LVIDs 2.69 2.1 - 4.0 cm    FS 26 28 - 44 %    LA volume 73.44 cm3    LV mass 127.37 g    LA size 3.17 cm    RVDD 2.64 cm    Left Ventricle Relative Wall Thickness 0.53 cm    AV mean gradient 5 mmHg    AV valve area 1.86 cm2    AV Velocity Ratio 0.58     AV index (prosthetic) 0.59     MV mean gradient 1 mmHg    MV valve area p 1/2 method 3.57 cm2    MV valve area by continuity eq 2.05 cm2    E/A ratio 1.48     Mean e' 0.11 m/s    E wave deceleration time 212.32 msec    MV "A" wave duration [id number] msec    LVOT diameter 2.01 cm    LVOT area 3.2 cm2    LVOT peak jorge 0.84 m/s    LVOT peak VTI 22.10 cm    Ao peak jorge 1.45 m/s    Ao VTI 37.6 cm    LVOT stroke volume 70.09 cm3    AV peak gradient 8 mmHg    MV peak gradient 4 mmHg    E/E' ratio 10.00 m/s    MV Peak E Jorge 1.05 m/s    TR Max Jorge 2.58 m/s    MV VTI 34.2 cm    MV stenosis pressure 1/2 time 61.57 ms    MV Peak A Jorge 0.71 m/s    LV Systolic Volume 26.80 mL    LV Systolic Volume Index 13.6 mL/m2    LV Diastolic Volume 55.37 mL    LV Diastolic Volume Index 28.11 mL/m2    LA Volume Index 37.3 mL/m2    LV Mass Index 65 g/m2    RA Major Axis 5.18 cm    Left Atrium Minor Axis 4.94 cm    Left Atrium Major Axis 6.08 cm    Triscuspid Valve Regurgitation Peak Gradient 27 mmHg    LA Volume Index (Mod) 35.3 mL/m2    LA volume (mod) 69.49 cm3    RA Width 4.61 cm    Right Atrial Pressure (from IVC) 8 mmHg    EF 55 %    TV rest pulmonary artery pressure 35 mmHg    Narrative    · Concentric remodeling and normal systolic function.  · The estimated ejection fraction is 50-55%.  · There is abnormal septal wall motion.  · Normal left ventricular diastolic function.  · Normal right ventricular size with normal right ventricular systolic   function.  · Mild left atrial enlargement.  · Mild tricuspid regurgitation.  · Mild " pulmonic regurgitation.  · Intermediate central venous pressure (8 mmHg).  · The estimated PA systolic pressure is 35 mmHg.        Assessment and Plan:     Brief HPI: Seen this morning on AM rounds with Dr. Lozano while resting in bed with daughter at the bedside. Denies any complaints. Discussed POC as detailed below-verbalized understanding and agrees with POC     * UTI (urinary tract infection)  - UA positive; urine culture with prelim results with staff; blood cultures pending  - on abx; further management per primary team     Hypotension  - possibly infectious related  - does not appear to be related to bradycardia  - on IV Levophed; blood cultures pending; urine culture with staph  - monitor; IVF per primary team recs     Chronic diastolic heart failure  - echo in July with normal LVEF  - previous admission with ADHF treated with IV diuresis; on Lasix daily as an outpatient  - appears euvolemic on exam; no acute decompensation present  - repeat echo yesterday with normal LVEF and estimated CVP 8    Bradycardia  - HR 39-50 per Epic overnight; telemetry reviewed with lowest HR noted to 39 SB with 2 second pause not overly concerning; patient asymptomatic from bradycardia  - low HR chronic and multifactoral at this point   - not on BB, CCB or other AV mir blockers as an outpatient and will continue to avoid for now   - baseline outpatient HR per Epic review 42-63; EKG with no acute issues  -  no indication for pacemaker at this time; avoid AV mir blockers and monitor on telemetry while admitted     Coronary artery disease of native artery with stable angina pectoris  - history of CAD with LAD with R-L collaterals  - reported chest pain yesterday consistent with chronic stable angina; troponin negative   - unable to assess exertional symptoms due to physical limitations   - recommend medical management although limited given hematuria; ASA when cleared by Urology; recommend statin but no BB given baseline  bradycardia; could use Imdur starting at 30 mg but will hold off given marginal BP and pressor use; currently chest pain free   - echo yesterday with EF 50-55%; no need for further cardiac workup at this time; recommend cardiology follow up as an outpatient         VTE Risk Mitigation (From admission, onward)         Ordered     Place MALLORIE hose  Until discontinued         01/10/23 1309     enoxaparin injection 40 mg  Daily         01/09/23 0107     IP VTE HIGH RISK PATIENT  Once         01/09/23 0107     Place sequential compression device  Until discontinued         01/09/23 0107                DIAN Charles, ANP  Cardiology  [facility] - Intensive Care

## 2023-01-11 NOTE — PLAN OF CARE
Care Plan    POC reviewed with Tasha Hawley and family. Questions and concerns addressed. Levo titrated overnight due to MAP being under 65; however her HR started fluctuating between 20's and 40's. EICU MD notified, switched from Levo to Neosynephrine at 0.2. Tolerated well. Able to turn off at 0630. Turned off at 0630. Pt progressing toward goals. Will continue to monitor. See below and flowsheets for full assessment and VS info.    67 y.o.         Neuro:  Monon Coma Scale  Best Eye Response: 4-->(E4) spontaneous  Best Motor Response: 6-->(M6) obeys commands  Best Verbal Response: 5-->(V5) oriented  Monon Coma Scale Score: 15  Pupil PERRLA: yes  24 hr Temp:  [96.7 °F (35.9 °C)-98.8 °F (37.1 °C)]      CV:  Rhythm: sinus bradycardia  DVT prophylaxis: VTE Required Core Measure: (SCDs) Sequential compression device initiated/maintained, Pharmacological prophylaxis initiated/maintained    Resp:   2 L NC       GI/:  GI prophylaxis: no  Diet/Nutrition Received: 2 gram sodium, low saturated fat/low cholesterol, restrict fluids  Last Bowel Movement: 01/08/23  Voiding Characteristics: suprapubic catheter   Intake/Output Summary (Last 24 hours) at 1/11/2023 0523  Last data filed at 1/11/2023 0430  Gross per 24 hour   Intake 1465.54 ml   Output 3635 ml   Net -2169.46 ml       Labs/Accuchecks:  Recent Labs   Lab 01/11/23  0307   WBC 4.14   RBC 3.93*   HGB 9.7*   HCT 33.0*         Recent Labs   Lab 01/08/23  2347 01/09/23  0443 01/11/23  0307      < > 139   K 3.2*   < > 4.3   CO2 31*   < > 31*   CL 95   < > 100   BUN 12   < > 7*   CREATININE 1.1   < > 1.0   ALKPHOS 105  --   --    ALT 5*  --   --    AST 10  --   --    BILITOT 0.6  --   --     < > = values in this interval not displayed.    No results for input(s): PROTIME, INR, APTT, HEPANTIXA in the last 168 hours.   Recent Labs   Lab 01/10/23  1258   TROPONINI 0.013       Electrolytes: No replacement orders  Accuchecks: none    Gtts/LDAs:    NORepinephrine bitartrate-D5W Stopped (01/11/23 0151)    phenylephrine 0.2 mcg/kg/min (01/11/23 0402)       Lines/Drains/Airways       Drain  Duration                  Suprapubic Catheter 12/13/22 100% silicone 20 Fr. 29 days              Line  Duration                  Subcutaneous Infusion Pump Abdomen (Comment) -- days              Peripheral Intravenous Line  Duration                  Peripheral IV - Single Lumen 01/09/23 0020 Right Antecubital 2 days         Peripheral IV - Single Lumen 01/10/23 1710 20 G Anterior;Right Upper Arm <1 day                    Skin/Wounds  Bathing/Skin Care: other (see comments) (hands and face wiped) (01/10/23 1625)  Wounds: No  Wound care consulted: No    Consults  Consults (From admission, onward)          Status Ordering Provider     Inpatient consult to Infectious Diseases  Once        Provider:  (Not yet assigned)    Acknowledged CHIDI HENRIQUEZ     Inpatient consult to Cardiology-[facility]  Once        Provider:  Nick Lozano MD    Completed CHIDI HENRIQUEZ     Inpatient consult to Palliative Care  Once        Provider:  Robert Stein Jr., MD    Completed CHIDI HENRIQUEZ     Inpatient consult to Urology  Once        Provider:  (Not yet assigned)    Completed CHIDI HENRIQUEZ     IP consult to case management  Once        Provider:  (Not yet assigned)    Completed CHRISTINE SWANSON

## 2023-01-11 NOTE — PLAN OF CARE
Problem: Physical Therapy  Goal: Physical Therapy Goal  Description: Goals to be met by: 23     Patient will increase functional independence with mobility by performin. Supine to sit with Stand-by Assistance  2. Sit to supine with Stand-by Assistance  3. Sit to stand transfer with Stand-by Assistance  4. Bed to chair transfer with Stand-by Assistance using Rolling Walker  5. Gait  x 50 feet with Stand-by Assistance using Rolling Walker.     Outcome: Ongoing, Progressing     PT Re-eval completed, note to follow. Pt orthostatic and symptomatic during session. BP supine: 110/50, seated EOB: 94/52, seated after ~1-2 mins: 91/54, supine: 140/61, and supine after several minutes: 112/58. Upon returning supine, pt c/o of chest pain, decreased sensation to L UE/LE and L side of face initially, and B LE spasms/cramps. Nsg notified immediately and critical care MD in room to assess pt at end of session. HR in 40s at rest and increased to 60s with activity. D/c recs TBD pending progress and medical stability.

## 2023-01-11 NOTE — PLAN OF CARE
"  Problem: Occupational Therapy  Goal: Occupational Therapy Goal  Description: Goals to be met by: 02/09/2023      Patient will increase functional independence with ADLs by performing:    UE Dressing with Supervision.  LE Dressing with Minimal Assistance.  Grooming while standing with Stand-by Assistance.  Toileting from bedside commode or toilet with Contact Guard Assistance for hygiene and clothing management.   Toilet transfer to bedside commode or toilet with Contact Guard Assistance.  Increased functional strength to WFL for self care.  Upper extremity exercise program x10 reps per handout, with independence.     Outcome: Ongoing, Progressing   OT re-evaluation completed after transfer to ICU. POC remains appropriate at this time. Recommendations continue TBD 2/2 pt medical status.   Pt progressing towards OT goals. Pt reported chest pain 8.5-9/10 and L sided decreased sensation at end of PT/OT session, nsg and MD presented promptly to attend to pt. No strength deficits noted.   Pt  looking up towards right and stating, "That's [place]." After return to supine at end of session.  Pt with dizziness in seated, BP decreased and standing not attempted this session 2/2 concerns for continued orthostatic hypotension. HR 40-60 in session.  BP taken as follows:   BP HR MAP   Supine 110/50 44 72   Seated 94/52 49 71   Seated ~1-2 minutes 91/54 50 70   Return to Supine 140/61 46 88   Supine ~5 minutes 112/58 42 79     Cont OT POC  "

## 2023-01-11 NOTE — PLAN OF CARE
Problem: Adult Inpatient Plan of Care  Goal: Plan of Care Review  Outcome: Ongoing, Progressing  Goal: Patient-Specific Goal (Individualized)  Outcome: Ongoing, Progressing  Goal: Absence of Hospital-Acquired Illness or Injury  Outcome: Ongoing, Progressing  Goal: Optimal Comfort and Wellbeing  Outcome: Ongoing, Progressing  Goal: Readiness for Transition of Care  Outcome: Ongoing, Progressing     Problem: Fall Injury Risk  Goal: Absence of Fall and Fall-Related Injury  Outcome: Ongoing, Progressing     Problem: UTI (Urinary Tract Infection)  Goal: Improved Infection Symptoms  Outcome: Ongoing, Progressing     Problem: Electrolyte Imbalance  Goal: Electrolyte Balance  Outcome: Ongoing, Progressing     Problem: Heart Failure Comorbidity  Goal: Maintenance of Heart Failure Symptom Control  Outcome: Ongoing, Progressing     Problem: Obstructive Sleep Apnea Risk or Actual Comorbidity Management  Goal: Unobstructed Breathing During Sleep  Outcome: Ongoing, Progressing     Problem: Pain Chronic (Persistent) (Comorbidity Management)  Goal: Acceptable Pain Control and Functional Ability  Outcome: Ongoing, Progressing     Problem: Coping Ineffective  Goal: Effective Coping  Outcome: Ongoing, Progressing     Problem: Skin Injury Risk Increased  Goal: Skin Health and Integrity  Outcome: Ongoing, Progressing

## 2023-01-11 NOTE — ASSESSMENT & PLAN NOTE
· BP 81-93/42-55 over last 24 hours  · S/p 1.5L total NS bolus   · Lasix o/h   · Given this and setting of CHF, do not want to overload w/ IVF  · Patient moved to ICU 1/10 for Levophed gtt  · BP stable and weaning off  · Breakthrough pain meds stopped  · Likely etiology intrathecal dilaudid pump vs UTI sepsis (unlikely) vs hypothyroid vs polypharmacy  · Attempted to call Dr. Hillman (patient's pain medicine provider) regarding possibly decreasing dilaudid pump dose, no answer so voicemail left

## 2023-01-11 NOTE — ASSESSMENT & PLAN NOTE
· Recently exchanged. Exchanged every 3 days  · ED provider repositioned it this admission  · Continue to monitor urine output  · Urology consulted to replace 1/9 - now clear yellow UOP

## 2023-01-11 NOTE — SUBJECTIVE & OBJECTIVE
Interval History: Patient seen at bedside during ICU rounds. BP is much improved while weaning off Levophed gtt. Patient's HR still low but patient asymptomatic. She has been c/o CP off and on but trops NL and no ischemic changes on EKG.    Review of Systems   Constitutional:  Negative for fatigue and fever.   HENT:  Negative for trouble swallowing.    Respiratory:  Negative for shortness of breath.    Cardiovascular:  Positive for chest pain and leg swelling. Negative for palpitations.   Gastrointestinal:  Negative for abdominal pain.   Genitourinary:         Suprapubic catheter   Musculoskeletal:  Positive for back pain, gait problem and myalgias.   Neurological:  Positive for weakness. Negative for dizziness, light-headedness, numbness and headaches.   Psychiatric/Behavioral:  Negative for agitation and confusion. The patient is not nervous/anxious.    Objective:     Vital Signs (Most Recent):  Temp: 97.7 °F (36.5 °C) (01/11/23 1130)  Pulse: (!) 43 (01/11/23 1230)  Resp: 15 (01/11/23 1230)  BP: (!) 115/56 (01/11/23 1230)  SpO2: 99 % (01/11/23 1230)   Vital Signs (24h Range):  Temp:  [97 °F (36.1 °C)-98.2 °F (36.8 °C)] 97.7 °F (36.5 °C)  Pulse:  [37-62] 43  Resp:  [13-36] 15  SpO2:  [87 %-100 %] 99 %  BP: ()/(33-66) 115/56     Weight: 85.7 kg (189 lb)  Body mass index is 29.6 kg/m².    Intake/Output Summary (Last 24 hours) at 1/11/2023 1353  Last data filed at 1/11/2023 0639  Gross per 24 hour   Intake 1940.77 ml   Output 4085 ml   Net -2144.23 ml      Physical Exam  Vitals and nursing note reviewed.   Constitutional:       General: She is not in acute distress.     Appearance: She is obese.   HENT:      Head: Normocephalic and atraumatic.   Eyes:      Pupils: Pupils are equal, round, and reactive to light.   Cardiovascular:      Rate and Rhythm: Regular rhythm. Bradycardia present.      Pulses:           Dorsalis pedis pulses are 1+ on the right side and 1+ on the left side.      Heart sounds: Normal heart  sounds.   Pulmonary:      Effort: Pulmonary effort is normal. No respiratory distress.      Breath sounds: Decreased breath sounds present.   Abdominal:      General: Bowel sounds are normal. There is no distension.      Palpations: Abdomen is soft.      Tenderness: There is no abdominal tenderness.   Genitourinary:     Comments: Suprapubic catheter  Musculoskeletal:      Right lower le+ Edema present.      Left lower le+ Edema present.   Skin:     General: Skin is warm.   Neurological:      Mental Status: She is alert and oriented to person, place, and time.      Motor: Weakness present.   Psychiatric:         Mood and Affect: Mood normal.         Behavior: Behavior normal.         Thought Content: Thought content normal.         Judgment: Judgment normal.       Significant Labs: All pertinent labs within the past 24 hours have been reviewed.    Significant Imaging: I have reviewed all pertinent imaging results/findings within the past 24 hours.

## 2023-01-11 NOTE — NURSING
Pt requiring Levo due to MAP of less than 65, but noticed her HR was briefly dropping into 20's when turned on. Called EICU Dr. Moore, discussed switching pressors to Neosynephrine as pt seems to be extremely sensitive to the Levo. Starting Antonio at 0.5, and giving 250 ml bolus of normal saline. Additional lab (Free T3) ordered as well.

## 2023-01-11 NOTE — PLAN OF CARE
The sw met with the pt to complete the assessment. The pt lives with Dangelo Hawley(spouse)[phone] in [place]. The pt's spouse assists her as needed with her adl's and she has a w/c,rollator,bsc and tubbench. The pt doesn't drive,so her spouse transports her to her dr glory's and errands. He will transport the pt home at d/c. The pt's current with [facility]. The sw completed the white board in the pt's room and gave her a pamphlet with her name and contact info on it. The sw encouraged the pt to call if she has any further questions or concerns. The sw will continue to follow the pt throughout her transitions of care and will assist with any d/c needs. The pt follows with Dr. Mayo for Urology services for her suprapubic catheter.        01/11/23 1348   Discharge Assessment   Assessment Type Discharge Planning Assessment   Confirmed/corrected address, phone number and insurance Yes   Confirmed Demographics Correct on Facesheet   Source of Information patient   When was your last doctors appointment? 01/06/23   Communicated JACKIE with patient/caregiver Date not available/Unable to determine   Reason For Admission UTI   People in Home spouse   Do you expect to return to your current living situation? Yes   Do you have help at home or someone to help you manage your care at home? Yes   Who are your caregiver(s) and their phone number(s)? Dangelo Hawley(spouse)[phone]   Prior to hospitilization cognitive status: Alert/Oriented   Current cognitive status: Alert/Oriented   Walking or Climbing Stairs ambulation difficulty, requires equipment;stair climbing difficulty, requires equipment;transferring difficulty, requires equipment   Dressing/Bathing bathing difficulty, assistance 1 person   Home Accessibility wheelchair accessible  (pt has a ramp built)   Home Layout Able to live on 1st floor   Equipment Currently Used at Home wheelchair;rollator;bedside commode;bath bench   Readmission within 30  days? No   Patient currently being followed by outpatient case management? Yes   If yes, name of outpatient case management following: [facility] outpatient case management   Do you currently have service(s) that help you manage your care at home?   (the pt's current with [facility])   Do you take prescription medications? Yes   Do you have prescription coverage? Yes   Coverage [employer]/Medicaid of [place].   Do you have any problems affording any of your prescribed medications? No  (the pt receives her meds affordably at [facility] in [place])   Is the patient taking medications as prescribed? yes   Who is going to help you get home at discharge? Dangelo Hawley(spouse)[phone]   How do you get to doctors appointments? family or friend will provide   Are you on dialysis? No   Do you take coumadin? No   Discharge Plan A Home Health  (the pt's current with [facility])   Discharge Plan B Other  (TBD)   DME Needed Upon Discharge  other (see comments)  (TBD)   Discharge Plan discussed with: Patient   Discharge Barriers Identified None   Physical Activity   On average, how many days per week do you engage in moderate to strenuous exercise (like a brisk walk)? 0 days   On average, how many minutes do you engage in exercise at this level? 0 min   Financial Resource Strain   How hard is it for you to pay for the very basics like food, housing, medical care, and heating? Not hard   Housing Stability   In the last 12 months, was there a time when you were not able to pay the mortgage or rent on time? N   In the last 12 months, how many places have you lived? 1   In the last 12 months, was there a time when you did not have a steady place to sleep or slept in a shelter (including now)? N   Transportation Needs   In the past 12 months, has lack of transportation kept you from medical appointments or from getting medications? yes   In the past 12 months, has lack of  transportation kept you from meetings, work, or from getting things needed for daily living? Yes   Food Insecurity   Within the past 12 months, you worried that your food would run out before you got the money to buy more. Never true   Within the past 12 months, the food you bought just didn't last and you didn't have money to get more. Never true   Stress   Do you feel stress - tense, restless, nervous, or anxious, or unable to sleep at night because your mind is troubled all the time - these days? Rather much   Social Connections   In a typical week, how many times do you talk on the phone with family, friends, or neighbors? More than 3   How often do you get together with friends or relatives? More than 3   How often do you attend Yazdanism or Confucianist services? Never   Do you belong to any clubs or organizations such as Yazdanism groups, unions, fraternal or athletic groups, or school groups? Yes   How often do you attend meetings of the clubs or organizations you belong to? Never   Are you , , , , never , or living with a partner?    Alcohol Use   Q1: How often do you have a drink containing alcohol? Never   Q2: How many drinks containing alcohol do you have on a typical day when you are drinking? None   Q3: How often do you have six or more drinks on one occasion? Never   OTHER   Name(s) of People in Home Dangelo Hawley(spouse)[phone]

## 2023-01-11 NOTE — EICU
Low HR with levo and drop in MAP   Try phenylephrine  May need dopamine  Seems to be sinus riley- get EKG if persistent  Also check labs, check free T3 as TSH high - already on thyroid replecement  Intake/Output - Last 3 Shifts         01/09 0700  01/10 0659 01/10 0700  01/11 0659    P.O. 875 561    I.V. (mL/kg)  28.9 (0.3)    IV Piggyback 513.3 400    Total Intake(mL/kg) 1388.3 (16.1) 989.8 (11.5)    Urine (mL/kg/hr) 550 (0.3) 3375 (1.6)    Stool 0 0    Total Output 550 3375    Net +838.3 -2385.2          Stool Occurrence 0 x 0 x          Give small fluid bolus also- not sounding wet per RN     D/w RN

## 2023-01-11 NOTE — SUBJECTIVE & OBJECTIVE
Review of Systems   Constitutional: Negative for chills, decreased appetite, diaphoresis and fever.   Cardiovascular:  Negative for chest pain, claudication, cyanosis, dyspnea on exertion, irregular heartbeat, leg swelling, near-syncope, orthopnea, palpitations, paroxysmal nocturnal dyspnea and syncope.   Respiratory:  Negative for cough, hemoptysis, shortness of breath and wheezing.    Gastrointestinal:  Negative for bloating, abdominal pain, constipation, diarrhea, melena, nausea and vomiting.   Neurological:  Negative for dizziness and weakness.   Objective:     Vital Signs (Most Recent):  Temp: 97.7 °F (36.5 °C) (01/11/23 1130)  Pulse: (!) 50 (01/11/23 1000)  Resp: 15 (01/11/23 1000)  BP: (!) 77/39 (01/11/23 1000)  SpO2: 99 % (01/11/23 1000)   Vital Signs (24h Range):  Temp:  [97 °F (36.1 °C)-98.2 °F (36.8 °C)] 97.7 °F (36.5 °C)  Pulse:  [37-62] 50  Resp:  [13-36] 15  SpO2:  [87 %-100 %] 99 %  BP: ()/(33-66) 77/39     Weight: 85.7 kg (189 lb)  Body mass index is 29.6 kg/m².     SpO2: 99 %         Intake/Output Summary (Last 24 hours) at 1/11/2023 1215  Last data filed at 1/11/2023 0639  Gross per 24 hour   Intake 1940.77 ml   Output 4085 ml   Net -2144.23 ml       Lines/Drains/Airways       Drain  Duration                  Suprapubic Catheter 12/13/22 100% silicone 20 Fr. 29 days              Line  Duration                  Subcutaneous Infusion Pump Abdomen (Comment) -- days              Peripheral Intravenous Line  Duration                  Peripheral IV - Single Lumen 01/09/23 0020 Right Antecubital 2 days         Peripheral IV - Single Lumen 01/10/23 1710 20 G Anterior;Right Upper Arm <1 day                    Physical Exam  Constitutional:       General: She is not in acute distress.     Appearance: She is well-developed.   Cardiovascular:      Rate and Rhythm: Normal rate and regular rhythm.      Heart sounds: No murmur heard.    No gallop.   Pulmonary:      Effort: Pulmonary effort is normal. No  "respiratory distress.      Breath sounds: Normal breath sounds. No wheezing.   Abdominal:      General: Bowel sounds are normal. There is no distension.      Palpations: Abdomen is soft.      Tenderness: There is no abdominal tenderness.   Skin:     General: Skin is warm and dry.   Neurological:      Mental Status: She is alert and oriented to person, place, and time.       Significant Labs: BMP:   Recent Labs   Lab 01/10/23  0334 01/11/23  0307   GLU 93 100    139   K 4.4 4.3    100   CO2 29 31*   BUN 10 7*   CREATININE 0.9 1.0   CALCIUM 8.5* 8.9   MG  --  2.0    and CBC   Recent Labs   Lab 01/09/23  2030 01/10/23  0334 01/11/23  0307   WBC 6.17 5.29 4.14   HGB 9.0* 8.5* 9.7*   HCT 29.7* 28.5* 33.0*    174 201       Significant Imaging: Echocardiogram: Transthoracic echo (TTE) complete (Cupid Only):   Results for orders placed or performed during the hospital encounter of 01/08/23   Echo   Result Value Ref Range    BSA 2.01 m2    TDI SEPTAL 0.10 m/s    LV LATERAL E/E' RATIO 9.55 m/s    LV SEPTAL E/E' RATIO 10.50 m/s    LA WIDTH 5.00 cm    IVC diameter 2.36 cm    Left Ventricular Outflow Tract Mean Velocity 0.50 cm/s    Left Ventricular Outflow Tract Mean Gradient 1.24 mmHg    Pulmonary Valve Mean Velocity 0.74 m/s    TDI LATERAL 0.11 m/s    PV PEAK VELOCITY 0.92 cm/s    LVIDd 3.63 3.5 - 6.0 cm    IVS 1.25 (A) 0.6 - 1.1 cm    Posterior Wall 0.97 0.6 - 1.1 cm    LVIDs 2.69 2.1 - 4.0 cm    FS 26 28 - 44 %    LA volume 73.44 cm3    LV mass 127.37 g    LA size 3.17 cm    RVDD 2.64 cm    Left Ventricle Relative Wall Thickness 0.53 cm    AV mean gradient 5 mmHg    AV valve area 1.86 cm2    AV Velocity Ratio 0.58     AV index (prosthetic) 0.59     MV mean gradient 1 mmHg    MV valve area p 1/2 method 3.57 cm2    MV valve area by continuity eq 2.05 cm2    E/A ratio 1.48     Mean e' 0.11 m/s    E wave deceleration time 212.32 msec    MV "A" wave duration 148.148347733733910 msec    LVOT diameter 2.01 cm    " LVOT area 3.2 cm2    LVOT peak jorge 0.84 m/s    LVOT peak VTI 22.10 cm    Ao peak jorge 1.45 m/s    Ao VTI 37.6 cm    LVOT stroke volume 70.09 cm3    AV peak gradient 8 mmHg    MV peak gradient 4 mmHg    E/E' ratio 10.00 m/s    MV Peak E Jorge 1.05 m/s    TR Max Jorge 2.58 m/s    MV VTI 34.2 cm    MV stenosis pressure 1/2 time 61.57 ms    MV Peak A Jorge 0.71 m/s    LV Systolic Volume 26.80 mL    LV Systolic Volume Index 13.6 mL/m2    LV Diastolic Volume 55.37 mL    LV Diastolic Volume Index 28.11 mL/m2    LA Volume Index 37.3 mL/m2    LV Mass Index 65 g/m2    RA Major Axis 5.18 cm    Left Atrium Minor Axis 4.94 cm    Left Atrium Major Axis 6.08 cm    Triscuspid Valve Regurgitation Peak Gradient 27 mmHg    LA Volume Index (Mod) 35.3 mL/m2    LA volume (mod) 69.49 cm3    RA Width 4.61 cm    Right Atrial Pressure (from IVC) 8 mmHg    EF 55 %    TV rest pulmonary artery pressure 35 mmHg    Narrative    · Concentric remodeling and normal systolic function.  · The estimated ejection fraction is 50-55%.  · There is abnormal septal wall motion.  · Normal left ventricular diastolic function.  · Normal right ventricular size with normal right ventricular systolic   function.  · Mild left atrial enlargement.  · Mild tricuspid regurgitation.  · Mild pulmonic regurgitation.  · Intermediate central venous pressure (8 mmHg).  · The estimated PA systolic pressure is 35 mmHg.

## 2023-01-11 NOTE — ASSESSMENT & PLAN NOTE
- echo in July with normal LVEF  - previous admission with ADHF treated with IV diuresis; on Lasix daily as an outpatient  - appears euvolemic on exam; no acute decompensation present  - repeat echo yesterday with normal LVEF and estimated CVP 8

## 2023-01-11 NOTE — ASSESSMENT & PLAN NOTE
· ->UA/UC from 01/06 showed staph aureus, sensitive to oxacillin and bacterium  · ->repeat UA and UC from 01/08 - staph aureus  · ->s/p clindamycin in ED.   · ->s/p augmentin for a total 5-7 day course  · Changed to ancef 1/9  · UC 1/8 - staph schleiferi  · Patient had been changed to broad spectrum abx in the setting of possible sepsis 1/10  · Unlikely cause of bradycardia and hypotension  · Lactate NL, afebrile, WBC NL  · Changed back to ancef 1/10  · ID consult placed for chronic UTIs

## 2023-01-11 NOTE — ASSESSMENT & PLAN NOTE
- possibly infectious related  - does not appear to be related to bradycardia  - on IV Levophed; blood cultures pending; urine culture with staph  - monitor; IVF per primary team recs

## 2023-01-11 NOTE — CONSULTS
[facility] Infectious Diseases Consult Note    Primary Attending Physician: Dr. Garcia  Consultant Attending: Dr. Rodriguez   Consultant Fellow: Patrizia    Reason for Consult:     Septic shock 2/2 UTI, h/o MDR UTIs     Assessment and Recommendations:      Tasha Hawley is a 67 y.o. female with history of neurogenic bladder s/p suprapubic placement (c/b recurrent UTIs incl ESBL), chronic back pain, CHF, and hypothyroidism admitted with septic shock 2/2 UTI. On admission, afebrile though hypotensive. WBC 7 with normal lactic acid. Started on clindamycin for Staph UTI per primary team. 1/10 Transferred to MICU for hypotension, started on pressor support. 1/6 and 1/8 Ucx with Staph schleiferi/MSSA, sensitive to oxacillin and bactrim (though patient reports allergy to bactrim and vanc). Currently on cefazolin, weaning off of levo now.     RECOMMENDATIONS:   - continue cefazolin for now, will de-escalate to oral as clinically improves.   - recommend urology evaluation as patient reporting pain and leakage about catheter     Thank you for allowing us to participate in the care of this patient. Please contact me if you have any questions regarding this consult.    Maria Isabel Vu MD  [facility], PGY-4  Cell: [phone]       Subjective:      History of Present Illness:  Tasha Hawley is a 67 y.o. female with history of neurogenic bladder s/p suprapubic placement (c/b recurrent MDR UTIs), chronic back pain, CHF, and hypothyroidism who presented with decreased urine output and hematuria. Associated with pain from suprapubic catheter. recently had HH SPC exchange and Ucx, results from 1/6 with MSSA (as previously). She reports allergies to vancomycin and Bactrim.     On admission, afebrile though hypotensive. WBC 7 with normal lactic acid. Started on clindamycin for Staph UTI per primary team. 1/10 Transferred to MICU for hypotension and sinus bradycardia (asymptomatic) with HR in the 30s. Received 2 boluses  of NS (500 mL and 1L), started on pressor support.     1/2022 Ucx: Pseudomonas putida/fluorescens   2/2022 Ucx: Candida parapsilosis   4/2022 Ucx: MSSA  5/2022 Ucx: ESBL E coli   7/2022 Ucx: ESBL E coli  8/2022 Ucx: MSSA   12/2022 Ucx: Enterococcus faecalis, MSSA   1/2023 Ucx: MSSA  1/8 Ucx: Staph schleiferi     Past Medical History:  Past Medical History:   Diagnosis Date    Anticoagulant long-term use     Anxiety     Arthritis     Bilateral lower extremity edema     severe chronic    Carotid artery occlusion     Cataract     CHF (congestive heart failure)     Coronary artery disease     subtotalled LAD with collateral    Depression     Fever blister     Hard of hearing     Hypokalemia 1/9/2023    Hyponatremia 2/4/2022    Hypothyroid     Iron deficiency anemia     Lumbar radiculopathy     with chronic pain    Ocular migraine     Renal disorder     Sleep apnea     cpap       Past Surgical History:  Past Surgical History:   Procedure Laterality Date    ADENOIDECTOMY      BACK SURGERY      x 12    CARDIAC CATHETERIZATION  2016    subtotalled LAD with right to left collaterals    CATARACT EXTRACTION W/  INTRAOCULAR LENS IMPLANT Left     Dr Coleman     CYSTOSCOPIC LITHOLAPAXY N/A 6/27/2019    Procedure: CYSTOLITHOLAPAXY;  Surgeon: Shireen Mayo MD;  Location: [facility] OR [address] [place] [address];  Service: Urology;  Laterality: N/A;    CYSTOSCOPIC LITHOLAPAXY N/A 9/3/2019    Procedure: CYSTOLITHOLAPAXY;  Surgeon: Shireen Mayo MD;  Location: [facility] OR 2ND FLR;  Service: Urology;  Laterality: N/A;    CYSTOSCOPY N/A 7/13/2021    Procedure: CYSTOSCOPY;  Surgeon: Shireen Mayo MD;  [facility];  Service: Urology;  Laterality: N/A;    CYSTOSCOPY  11/16/2021    Procedure: CYSTOSCOPY;  Surgeon: Shireen Mayo MD;  [facility];  Service: Urology;;    CYSTOSCOPY  7/19/2022    Procedure: CYSTOSCOPY;  Surgeon: Shireen Mayo MD;  [facility];  Service: Urology;;    CYSTOSCOPY WITH INJECTION OF  PERIURETHRAL BULKING AGENT  7/19/2022    Procedure: CYSTOSCOPY, WITH PERIURETHRAL BULKING AGENT INJECTION-MACROPLASTIQUE;  Surgeon: Shireen Mayo MD;  [facility];  Service: Urology;;    CYSTOSCOPY,WITH BOTULINUM TOXIN INJECTION N/A 12/13/2022    Procedure: CYSTOSCOPY,WITH BOTULINUM TOXIN INJECTION;  Surgeon: Shireen Mayo MD;  [facility];  Service: Urology;  Laterality: N/A;  300 U    ESOPHAGOGASTRODUODENOSCOPY N/A 5/23/2018    Procedure: ESOPHAGOGASTRODUODENOSCOPY (EGD);  Surgeon: Prince Vance MD;  Location: [facility] (4TH FLR);  Service: Endoscopy;  Laterality: N/A;  r/s 'd per Dr. Vance due to family emergency- ER    HYSTERECTOMY  1975    endometriosis    INJECTION OF BOTULINUM TOXIN TYPE A  7/13/2021    Procedure: INJECTION, BOTULINUM TOXIN, 200units;  Surgeon: Shireen Mayo MD;  [facility];  Service: Urology;;    INJECTION OF BOTULINUM TOXIN TYPE A  11/16/2021    Procedure: INJECTION, BOTULINUM TOXIN, 200units;  Surgeon: Shireen Mayo MD;  [facility];  Service: Urology;;    INJECTION OF BOTULINUM TOXIN TYPE A  7/19/2022    Procedure: INJECTION, BOTULINUM TOXIN, 300 units ;  Surgeon: Shireen Mayo MD;  [facility];  Service: Urology;;    INSERTION, SUPRAPUBIC CATHETER N/A 12/13/2022    Procedure: INSERTION, SUPRAPUBIC CATHETER;  Surgeon: Shieren Mayo MD;  [facility];  Service: Urology;  Laterality: N/A;  exchange    pain pump placement      SQ Dilaudid Pump managed by Dr. Hillman, Pain Management    REMOVAL OF BONE SPUR OF FOOT Bilateral 9/16/2022    Procedure: EXCISION ARTHRITIC BONE, BILATERAL FOOT;  Surgeon: Adam Mcguire DPM;  Location: [facility];  Service: Podiatry;  Laterality: Bilateral;    REPLACEMENT OF CATHETER N/A 10/31/2019    Procedure: REPLACEMENT, CATHETER-SUPRAPUBIC;  Surgeon: Shireen Mayo MD;  Location: [facility] [address] [place] [address];  Service: Urology;  Laterality: N/A;     SPINAL CORD STIMULATOR REMOVAL      before [place]    SPINE SURGERY  5-13-13    CERVICAL FUSION    TONSILLECTOMY         Allergies:  Review of patient's allergies indicates:   Allergen Reactions    (d)-limonene flavor      Other reaction(s): difficult intubation  Other reaction(s): Difficulty breathing    Bactrim [sulfamethoxazole-trimethoprim] Anaphylaxis    Benadryl [diphenhydramine hcl] Shortness Of Breath    Fentanyl Itching, Nausea And Vomiting and Swelling             Imitrex [sumatriptan succinate] Shortness Of Breath    Percocet [oxycodone-acetaminophen] Shortness Of Breath    Topamax [topiramate] Shortness Of Breath    Vancomycin Anaphylaxis     Rash    Butorphanol tartrate     Darvocet a500 [propoxyphene n-acetaminophen]      Other reaction(s): Difficulty breathing    Evening primrose (oenothera biennis)     White petrolatum-zinc     Zinc oxide-white petrolatum      Other reaction(s): Difficulty breathing    Latex, natural rubber Itching and Rash    Phenytoin Rash and Other (See Comments)     Trouble breathing       Medications:   In-Hospital Scheduled Medications:   aspirin  81 mg Oral Daily    atorvastatin  80 mg Oral Daily    ceFAZolin (ANCEF) IVPB  2 g Intravenous Q8H    enoxaparin  40 mg Subcutaneous Daily    FLUoxetine  60 mg Oral Daily    mupirocin   Nasal BID    pantoprazole  40 mg Oral Daily    polyethylene glycol  17 g Oral Daily    potassium chloride SA  20 mEq Oral BID    senna-docusate 8.6-50 mg  1 tablet Oral BID    traZODone  300 mg Oral QHS      In-Hospital PRN Medications:  sodium chloride 0.9%, sodium chloride 0.9%, sodium chloride 0.9%, acetaminophen, butalbital-acetaminophen-caffeine -40 mg, dextrose 10%, dextrose 10%, glucagon (human recombinant), glucose, glucose, magnesium hydroxide 400 mg/5 ml, melatonin, naloxone, ondansetron, promethazine, QUEtiapine, sodium chloride 0.9%   In-Hospital IV Infusion Medications:   NORepinephrine bitartrate-D5W        Home Medications:  Prior  to Admission medications    Medication Sig Start Date End Date Taking? Authorizing Provider   aspirin (ECOTRIN) 81 MG EC tablet Take 1 tablet (81 mg total) by mouth once daily. 7/31/22 7/31/23 Yes Braydon Martel MD   atorvastatin (LIPITOR) 80 MG tablet TAKE ONE TABLET BY MOUTH EVERY DAY 9/30/22  Yes Mesfin Hodges II, MD   butalbital-acetaminophen-caffeine -40 mg (FIORICET, ESGIC) -40 mg per tablet Take 1 tablet by mouth daily as needed. 9/9/21  Yes    FLUoxetine 20 MG capsule Take 3 capsules (60 mg total) by mouth once daily. 1/6/23  Yes Mesfin Hodges II, MD   furosemide (LASIX) 40 MG tablet Take 1 tablet (40 mg total) by mouth once daily. 1/6/23 1/6/24 Yes Mesfin Hodges II, MD   HYDROcodone-acetaminophen (NORCO)  mg per tablet Take 1 tablet by mouth every 6 (six) hours as needed for Pain. 9/17/22  Yes Adam Mcguire DPM   intrathecal pain pump compound Hydromorphone (7.5 mg/mL) infusion at 3.6799 mg/day (0.1533 mg/hr) out of a total reservoir volume of 37.3 mL  Pump filled every 2 months   Yes Nigel Hillman MD   levothyroxine (SYNTHROID) 137 MCG Tab tablet Take 1 tablet (137 mcg total) by mouth before breakfast. 1/6/23 2/5/23 Yes Mesfin Hodges II, MD   pantoprazole (PROTONIX) 40 MG tablet Take 1 tablet (40 mg total) by mouth once daily. 1/6/23  Yes Mesfin Hodges II, MD   potassium chloride SA (K-DUR,KLOR-CON) 20 MEQ tablet Take 1 tablet (20 mEq total) by mouth 2 (two) times daily. 1/6/23  Yes Mesfin Hodges II, MD   QUEtiapine (SEROQUEL) 100 MG Tab TAKE 2 TABLETS (200 MG) BY MOUTH NIGHTLY 1/6/23  Yes Mesfin Hodges II, MD   QUEtiapine (SEROQUEL) 25 MG Tab Take 12.5-25 mg twice daily as needed for anxiety 9/12/22  Yes Juan A Madera MD   senna (SENNA LAX) 8.6 mg tablet Take 2 tablets by mouth 2 (two) times daily. 1/31/20  Yes Nic Peres MD   tiZANidine (ZANAFLEX) 4 MG tablet Take 4 mg by mouth 2 (two) times daily. 7/26/22  Yes Historical Provider   traZODone  (DESYREL) 300 MG tablet Take 1 tablet (300 mg total) by mouth every evening. 1/6/23  Yes Mesfin Hodges II, MD   nitroGLYCERIN (NITROSTAT) 0.4 MG SL tablet Place 1 tablet (0.4 mg total) under the tongue every 5 (five) minutes as needed for Chest pain. 8/31/20 8/10/22  Mesfin Hodges II, MD   promethazine (PHENERGAN) 25 MG tablet Take 25 mg by mouth every 6 (six) hours as needed for Nausea.    Historical Provider       Family History:  Family History   Problem Relation Age of Onset    Cancer Mother 55        breast    Cancer Father         esophagus,had laryngectomy    Esophageal cancer Father     Parkinsonism Maternal Grandmother     Tremor Maternal Grandmother     No Known Problems Brother     No Known Problems Brother     Heart disease Maternal Uncle     Colon cancer Maternal Uncle         Less than 60    No Known Problems Sister     No Known Problems Maternal Aunt     Cirrhosis Paternal Aunt         ETOH    Liver disease Paternal Aunt         ETOH    Liver disease Paternal Uncle         ETOH    Cirrhosis Paternal Uncle         ETOH    No Known Problems Maternal Grandfather     No Known Problems Paternal Grandmother     No Known Problems Paternal Grandfather     Melanoma Neg Hx     Amblyopia Neg Hx     Blindness Neg Hx     Cataracts Neg Hx     Diabetes Neg Hx     Glaucoma Neg Hx     Hypertension Neg Hx     Macular degeneration Neg Hx     Retinal detachment Neg Hx     Strabismus Neg Hx     Stroke Neg Hx     Thyroid disease Neg Hx     Celiac disease Neg Hx     Colon polyps Neg Hx     Cystic fibrosis Neg Hx     Crohn's disease Neg Hx     Inflammatory bowel disease Neg Hx     Liver cancer Neg Hx     Rectal cancer Neg Hx     Stomach cancer Neg Hx     Ulcerative colitis Neg Hx     Lymphoma Neg Hx        Social History:  Social History     Tobacco Use    Smoking status: Never    Smokeless tobacco: Never   Substance Use Topics    Alcohol use: Never    Drug use: No       ROS       Objective:   Last 24 Hour Vital  "Signs:  BP  Min: 73/36  Max: 155/63  Temp  Av.6 °F (36.4 °C)  Min: 97 °F (36.1 °C)  Max: 98.2 °F (36.8 °C)  Pulse  Av.5  Min: 37  Max: 62  Resp  Av  Min: 13  Max: 36  SpO2  Av.4 %  Min: 87 %  Max: 100 %  Height  Av' 7" (170.2 cm)  Min: 5' 7" (170.2 cm)  Max: 5' 7" (170.2 cm)  Weight  Av.7 kg (189 lb)  Min: 85.7 kg (189 lb)  Max: 85.7 kg (189 lb)  I/O last 3 completed shifts:  In: 3329.1 [P.O.:1672; I.V.:103.7; IV Piggyback:1553.4]  Out: 4635 [Urine:4635]    Physical Exam  Vitals reviewed.   Constitutional:       General: She is not in acute distress.     Appearance: She is ill-appearing. She is not toxic-appearing or diaphoretic.   HENT:      Head: Normocephalic and atraumatic.      Nose: Nose normal.      Mouth/Throat:      Mouth: Mucous membranes are moist.      Pharynx: Oropharynx is clear. No oropharyngeal exudate.   Eyes:      Extraocular Movements: Extraocular movements intact.      Conjunctiva/sclera: Conjunctivae normal.      Pupils: Pupils are equal, round, and reactive to light.   Cardiovascular:      Rate and Rhythm: Regular rhythm. Bradycardia present.      Pulses: Normal pulses.      Heart sounds: No murmur heard.    No gallop.   Pulmonary:      Effort: Pulmonary effort is normal. No respiratory distress.      Breath sounds: Normal breath sounds. No wheezing or rales.   Abdominal:      General: Abdomen is flat. Bowel sounds are normal. There is no distension.      Palpations: Abdomen is soft.      Tenderness: There is abdominal tenderness. There is no guarding.      Comments: SPC in place   Musculoskeletal:         General: Swelling present. Normal range of motion.      Cervical back: Normal range of motion and neck supple.   Skin:     General: Skin is warm.      Capillary Refill: Capillary refill takes less than 2 seconds.      Coloration: Skin is not jaundiced.      Findings: No bruising or lesion.   Neurological:      General: No focal deficit present.      Mental Status: " She is alert.       Laboratory Results:  Most Recent Data:  CBC:   Lab Results   Component Value Date    WBC 4.14 01/11/2023    HGB 9.7 (L) 01/11/2023    HCT 33.0 (L) 01/11/2023     01/11/2023    MCV 84 01/11/2023    RDW 15.6 (H) 01/11/2023     BMP:   Lab Results   Component Value Date     01/11/2023    K 4.3 01/11/2023     01/11/2023    CO2 31 (H) 01/11/2023    BUN 7 (L) 01/11/2023     01/11/2023    CALCIUM 8.9 01/11/2023    MG 2.0 01/11/2023    PHOS 2.5 (L) 04/21/2022     LFTs:   Lab Results   Component Value Date    PROT 7.6 01/08/2023    ALBUMIN 3.4 (L) 01/08/2023    BILITOT 0.6 01/08/2023    AST 10 01/08/2023    ALKPHOS 105 01/08/2023    ALT 5 (L) 01/08/2023    GGT 51 04/30/2015     Coags:   Lab Results   Component Value Date    INR 1.0 01/14/2021     FLP:   Lab Results   Component Value Date    CHOL 141 04/05/2022    HDL 52 04/05/2022    LDLCALC 75.8 04/05/2022    TRIG 66 04/05/2022    CHOLHDL 36.9 04/05/2022     DM:   Lab Results   Component Value Date    HGBA1C 5.9 (H) 07/28/2022    HGBA1C 5.0 07/07/2021    HGBA1C 5.0 01/14/2021    LDLCALC 75.8 04/05/2022    CREATININE 1.0 01/11/2023     Thyroid:   Lab Results   Component Value Date    TSH 51.871 (H) 01/10/2023    FREET4 0.75 01/10/2023    B0UGXYV 5.2 05/17/2020     Anemia:   Lab Results   Component Value Date    IRON 92 06/10/2021    TIBC 330 06/10/2021    FERRITIN 121 06/10/2021    OVVGUGQA89 175 (L) 06/10/2021    FOLATE 4.9 06/12/2021     Cardiac:   Lab Results   Component Value Date    TROPONINI 0.013 01/10/2023    BNP 17 07/27/2022     Urinalysis:   Lab Results   Component Value Date    LABURIN (A) 01/08/2023     STAPHYLOCOCCUS SCHLEIFERI  >100,000cfu/ml  No other significant isolate      COLORU Yellow 01/10/2023    SPECGRAV 1.020 01/10/2023    NITRITE Negative 01/10/2023    KETONESU Negative 01/10/2023    UROBILINOGEN Negative 01/10/2023       Trended Lab Data:  Recent Labs   Lab 01/08/23  2347 01/08/23  2348 01/09/23  0443  01/09/23  2030 01/10/23  0334 01/11/23  0307   WBC  --    < > 5.91 6.17 5.29 4.14   HGB  --    < > 8.4* 9.0* 8.5* 9.7*   HCT  --    < > 27.5* 29.7* 28.5* 33.0*   PLT  --    < > 167 183 174 201   MCV  --    < > 81* 82 82 84   RDW  --    < > 15.5* 15.5* 15.7* 15.6*     --  135*  --  139 139   K 3.2*  --  3.2*  --  4.4 4.3   CL 95  --  98  --  104 100   CO2 31*  --  29  --  29 31*   BUN 12  --  13  --  10 7*   GLU 90  --  96  --  93 100   PROT 7.6  --   --   --   --   --    ALBUMIN 3.4*  --   --   --   --   --    BILITOT 0.6  --   --   --   --   --    AST 10  --   --   --   --   --    ALKPHOS 105  --   --   --   --   --    ALT 5*  --   --   --   --   --     < > = values in this interval not displayed.         Microbiology Data:  Microbiology Results (last 7 days)       Procedure Component Value Units Date/Time    Urine culture [[id number]]  (Abnormal)  (Susceptibility) Collected: 01/08/23 2212    Order Status: Completed Specimen: Urine Updated: 01/11/23 0853     Urine Culture, Routine STAPHYLOCOCCUS SCHLEIFERI  >100,000cfu/ml  No other significant isolate      Narrative:      Specimen Source->Urine    Blood culture [[id number]] Collected: 01/10/23 1440    Order Status: Completed Specimen: Blood Updated: 01/11/23 0745     Blood Culture, Routine No Growth to date      No growth to date    Blood culture [[id number]] Collected: 01/10/23 1440    Order Status: Completed Specimen: Blood Updated: 01/11/23 0745     Blood Culture, Routine No Growth to date      No growth to date    Blood culture #1 **CANNOT BE ORDERED STAT** [[id number]] Collected: 01/08/23 1577    Order Status: Completed Specimen: Blood from Antecubital, Left Arm Updated: 01/11/23 0612     Blood Culture, Routine No Growth to date      No Growth to date      No Growth to date    Narrative:      Collection has been rescheduled by [email or web address] at 01/08/2023 23:18 Reason:   Patient unavailable  Collection has been rescheduled by [email or web address] at 01/08/2023 23:18 Reason:   Patient  unavailable    Blood culture #2 **CANNOT BE ORDERED STAT** [[id number]] Collected: 01/09/23 0443    Order Status: Completed Specimen: Blood Updated: 01/11/23 0612     Blood Culture, Routine No Growth to date      No Growth to date    Narrative:      Collection has been rescheduled by [email or web address] at 01/08/2023 23:18 Reason:   Patient unavailable  Collection has been rescheduled by [email or web address] at 01/08/2023 23:55 Reason:   Unable to collect  Collection has been rescheduled by [email or web address] at 01/08/2023 23:18 Reason:   Patient unavailable  Collection has been rescheduled by [email or web address] at 01/08/2023 23:55 Reason:   Unable to collect    Urine culture [[id number]] Collected: 01/10/23 1526    Order Status: No result Specimen: Urine Updated: 01/10/23 1544              Antimicrobials:  Clindamycin, 1/8  Augmentin, 1/9  Cefazolin, 1/9 - 1/10  Linezolid, 1/10  Pip-tazo, 1/10     Other Results:  Radiology:  X-Ray Chest AP Portable    Result Date: 1/10/2023  EXAMINATION: XR CHEST AP PORTABLE CLINICAL HISTORY: chest pain; TECHNIQUE: Single frontal portable view of the chest was performed.  Patient is imaged in severe dextro rotation, significantly limiting evaluation for COMPARISON: Chest radiograph 07/27/2022 FINDINGS: Moderate enlargement of the cardiac silhouette which may reflect cardiomegaly and or pericardial effusion does not appear to be significantly changed on this limited exam. Widening of the mediastinal silhouette is likely projectional related to suboptimal patient positioning however, true mediastinal pathology cannot be excluded. Symmetric low lung volumes with associated bilateral perihilar and bibasilar airspace opacities.  No overt interstitial edema, sizable pleural effusion or pneumothorax. Multilevel degenerative changes of the imaged spine.  Spinal stimulator device projects over the lower thoracic/upper lumbar spine, not significantly changed.     1. Moderate enlargement of the cardiac silhouette which may reflect cardiomegaly and or  pericardial effusion does not appear to be significantly changed on this limited exam. 2. Widening of the mediastinal silhouette is likely projectional related to suboptimal patient positioning however, true mediastinal pathology cannot be excluded. 3. Symmetric low lung volumes with associated bilateral perihilar and bibasilar airspace opacities which may reflect compressive atelectasis however, underlying pulmonary infection cannot be excluded. Electronically signed by: Nino Painter Date:    01/10/2023 Time:    14:50    Echo    Result Date: 1/10/2023  · Concentric remodeling and normal systolic function. · The estimated ejection fraction is 50-55%. · There is abnormal septal wall motion. · Normal left ventricular diastolic function. · Normal right ventricular size with normal right ventricular systolic function. · Mild left atrial enlargement. · Mild tricuspid regurgitation. · Mild pulmonic regurgitation. · Intermediate central venous pressure (8 mmHg). · The estimated PA systolic pressure is 35 mmHg.      ED US Guided Misc Procedure    Result Date: 1/9/2023  Jose Rand MD     1/9/2023  4:41 AM ED US Guided Misc Procedure Date/Time: 1/9/2023 12:09 AM Performed by: Jose Rand MD Authorized by: Jose Rand MD Procedure:  Ultrasound-guided peripheral venous cannulation Indication:  Failed or difficult IV access (attempts failed by multiple nurses)  Right  Antecubital Procedure:  Dynamic ultrasound guidance used, Candidate vein examined with linear probe - confirmed collapsibility, lack of pulsatility, and proper anatomic location. and Using aseptic technique, IV catheter inserted with flash of blood noted, flow of venous blood confirmed. Catheter gauge:  20  Flushes easily and without pain. Patient tolerated procedure well. Complications:  None Charge?:  Yes

## 2023-01-11 NOTE — ASSESSMENT & PLAN NOTE
· HR as low as 39 bpm 1/10 - patient asymptomatic except for fatigue but became more lethargic as the day progressed   · Patient moved to ICU for pressers and HR dropped as low as 20s  · Patient back up into 30s-40s this AM and patient asymptomatic  · Cardiology following - see recs  · Echo performed- see report  · Maintain telemetry

## 2023-01-11 NOTE — PLAN OF CARE
Patient on oxygen with documented flow.  Will attempt to wean per O2 order protocol. Patient said she will attempt to wear CPAP. Will continue to monitor.

## 2023-01-11 NOTE — TELEPHONE ENCOUNTER
----- Message from Veronica Reyes sent at 1/11/2023  3:21 PM CST -----  Type:  Needs Medical Advice    Who Called:  - NURSE MURILLO  Symptoms (please be specific):    How long has patient had these symptoms:    Pharmacy name and phone #:    Would the patient rather a call back or a response via [facility]?   Best Call Back Number: [phone]  Additional Information: PT IS ADMITTED AT [facility]

## 2023-01-11 NOTE — CONSULTS
Consult Note  [facility]    Pulm/Critical Care Attending: Dr. Weaver  Resident: Paola Ballard  Admit Date: 1/8/2023  Today's Date: 01/11/2023  Reason for Consult:  Bradycardia and hypotension, on vasopressor    SUBJECTIVE:     HPI:    Ms. Hawley is a 67 y.o. F with PMHx of neurogenic bladder s/p suprapubic placement, chronic back pain, CHF, and hypothyroidism who presented with decreased urine output from catheter and hematuria. Endorsed pain from suprapubic catheter. She states she recently had an oupatient urine culture positive for Staph. She reports allergies to vancomycin and Bactrim. UC from 01/06 resulted showing oxacillin and Bactrim sensitive staph aureus. Pt  was started on clindamycin for UTI. Continued tx for UTI by primary hospital team.    1/10/23: Patient transferred to the ICU for hypotension and sinus bradycardia (asymptomatic) with HR in the 30s. Received 2 boluses of NS (500 mL and 1L). At baseline, pt has low BP but not this low. Recently increased intrathecal Dilaudid by pain specialist around 3 weeks ago. Pt was started on Levophed.    Interval hx:  Pt reports CP, SOB, suprapubic pain and bilateral lower extremity pain this morning on exam. She states that the pain has been ongoing for the last few weeks. Breathing comfortably on 1L NC, SpO2 98%. HR 41, sinus bradycardia. Currently on Levophed.       Review of patient's allergies indicates:   Allergen Reactions    (d)-limonene flavor      Other reaction(s): difficult intubation  Other reaction(s): Difficulty breathing    Bactrim [sulfamethoxazole-trimethoprim] Anaphylaxis    Benadryl [diphenhydramine hcl] Shortness Of Breath    Fentanyl Itching, Nausea And Vomiting and Swelling             Imitrex [sumatriptan succinate] Shortness Of Breath    Percocet [oxycodone-acetaminophen] Shortness Of Breath    Topamax [topiramate] Shortness Of Breath    Vancomycin Anaphylaxis     Rash    Butorphanol tartrate     Darvocet a500  [propoxyphene n-acetaminophen]      Other reaction(s): Difficulty breathing    Evening primrose (oenothera biennis)     White petrolatum-zinc     Zinc oxide-white petrolatum      Other reaction(s): Difficulty breathing    Latex, natural rubber Itching and Rash    Phenytoin Rash and Other (See Comments)     Trouble breathing       Past Medical History:   Diagnosis Date    Anticoagulant long-term use     Anxiety     Arthritis     Bilateral lower extremity edema     severe chronic    Carotid artery occlusion     Cataract     CHF (congestive heart failure)     Coronary artery disease     subtotalled LAD with collateral    Depression     Fever blister     Hard of hearing     Hypokalemia 1/9/2023    Hyponatremia 2/4/2022    Hypothyroid     Iron deficiency anemia     Lumbar radiculopathy     with chronic pain    Ocular migraine     Renal disorder     Sleep apnea     cpap     Past Surgical History:   Procedure Laterality Date    ADENOIDECTOMY      BACK SURGERY      x 12    CARDIAC CATHETERIZATION  2016    subtotalled LAD with right to left collaterals    CATARACT EXTRACTION W/  INTRAOCULAR LENS IMPLANT Left     Dr Coleman     CYSTOSCOPIC LITHOLAPAXY N/A 6/27/2019    Procedure: CYSTOLITHOLAPAXY;  Surgeon: Shireen Mayo MD;  Location: [facility] OR [address] [place] [address];  Service: Urology;  Laterality: N/A;    CYSTOSCOPIC LITHOLAPAXY N/A 9/3/2019    Procedure: CYSTOLITHOLAPAXY;  Surgeon: Shireen Mayo MD;  Location: [facility] OR 2ND FLR;  Service: Urology;  Laterality: N/A;    CYSTOSCOPY N/A 7/13/2021    Procedure: CYSTOSCOPY;  Surgeon: Shireen Mayo MD;  [facility];  Service: Urology;  Laterality: N/A;    CYSTOSCOPY  11/16/2021    Procedure: CYSTOSCOPY;  Surgeon: Shireen Mayo MD;  [facility];  Service: Urology;;    CYSTOSCOPY  7/19/2022    Procedure: CYSTOSCOPY;  Surgeon: Shireen Mayo MD;  Location: [facility] OR [address] [place] [address];  Service: Urology;;    CYSTOSCOPY WITH INJECTION OF PERIURETHRAL BULKING AGENT   7/19/2022    Procedure: CYSTOSCOPY, WITH PERIURETHRAL BULKING AGENT INJECTION-MACROPLASTIQUE;  Surgeon: Shireen Mayo MD;  [facility];  Service: Urology;;    CYSTOSCOPY,WITH BOTULINUM TOXIN INJECTION N/A 12/13/2022    Procedure: CYSTOSCOPY,WITH BOTULINUM TOXIN INJECTION;  Surgeon: Shireen Mayo MD;  [facility];  Service: Urology;  Laterality: N/A;  300 U    ESOPHAGOGASTRODUODENOSCOPY N/A 5/23/2018    Procedure: ESOPHAGOGASTRODUODENOSCOPY (EGD);  Surgeon: Prince Vance MD;  Location: [facility] (4TH FLR);  Service: Endoscopy;  Laterality: N/A;  r/s 'd per Dr. Vance due to family emergency- ER    HYSTERECTOMY  1975    endometriosis    INJECTION OF BOTULINUM TOXIN TYPE A  7/13/2021    Procedure: INJECTION, BOTULINUM TOXIN, 200units;  Surgeon: Shireen Mayo MD;  [facility];  Service: Urology;;    INJECTION OF BOTULINUM TOXIN TYPE A  11/16/2021    Procedure: INJECTION, BOTULINUM TOXIN, 200units;  Surgeon: Shireen Mayo MD;  [facility];  Service: Urology;;    INJECTION OF BOTULINUM TOXIN TYPE A  7/19/2022    Procedure: INJECTION, BOTULINUM TOXIN, 300 units ;  Surgeon: Shireen Mayo MD;  [facility];  Service: Urology;;    INSERTION, SUPRAPUBIC CATHETER N/A 12/13/2022    Procedure: INSERTION, SUPRAPUBIC CATHETER;  Surgeon: Shireen Mayo MD;  [facility];  Service: Urology;  Laterality: N/A;  exchange    pain pump placement      SQ Dilaudid Pump managed by Dr. Hillman, Pain Management    REMOVAL OF BONE SPUR OF FOOT Bilateral 9/16/2022    Procedure: EXCISION ARTHRITIC BONE, BILATERAL FOOT;  Surgeon: Adam Mcguire DPM;  Location: [facility];  Service: Podiatry;  Laterality: Bilateral;    REPLACEMENT OF CATHETER N/A 10/31/2019    Procedure: REPLACEMENT, CATHETER-SUPRAPUBIC;  Surgeon: Shireen Mayo MD;  Location: [facility] OR 1ST FLR;  Service: Urology;  Laterality: N/A;    SPINAL CORD STIMULATOR REMOVAL       before [facility]    SPINE SURGERY  5-13-13    CERVICAL FUSION    TONSILLECTOMY       Family History   Problem Relation Age of Onset    Cancer Mother 55        breast    Cancer Father         esophagus,had laryngectomy    Esophageal cancer Father     Parkinsonism Maternal Grandmother     Tremor Maternal Grandmother     No Known Problems Brother     No Known Problems Brother     Heart disease Maternal Uncle     Colon cancer Maternal Uncle         Less than 60    No Known Problems Sister     No Known Problems Maternal Aunt     Cirrhosis Paternal Aunt         ETOH    Liver disease Paternal Aunt         ETOH    Liver disease Paternal Uncle         ETOH    Cirrhosis Paternal Uncle         ETOH    No Known Problems Maternal Grandfather     No Known Problems Paternal Grandmother     No Known Problems Paternal Grandfather     Melanoma Neg Hx     Amblyopia Neg Hx     Blindness Neg Hx     Cataracts Neg Hx     Diabetes Neg Hx     Glaucoma Neg Hx     Hypertension Neg Hx     Macular degeneration Neg Hx     Retinal detachment Neg Hx     Strabismus Neg Hx     Stroke Neg Hx     Thyroid disease Neg Hx     Celiac disease Neg Hx     Colon polyps Neg Hx     Cystic fibrosis Neg Hx     Crohn's disease Neg Hx     Inflammatory bowel disease Neg Hx     Liver cancer Neg Hx     Rectal cancer Neg Hx     Stomach cancer Neg Hx     Ulcerative colitis Neg Hx     Lymphoma Neg Hx      Social History     Tobacco Use    Smoking status: Never    Smokeless tobacco: Never   Substance Use Topics    Alcohol use: Never    Drug use: No       All medications reviewed.    Review of Systems   Constitutional:  Negative for chills and fever.   Respiratory:  Positive for shortness of breath. Negative for hemoptysis.    Cardiovascular:  Positive for chest pain and leg swelling.   Gastrointestinal:  Positive for abdominal pain.   Genitourinary:  Negative for hematuria.   Musculoskeletal:  Positive for back pain.   Neurological:  Positive for weakness. Negative for  focal weakness.   Psychiatric/Behavioral:  Negative for hallucinations. The patient is not nervous/anxious.        OBJECTIVE:     Vital Signs Trends/Hx Reviewed  Vitals:    01/11/23 0600 01/11/23 0615 01/11/23 0630 01/11/23 0645   BP: (!) 119/57 (!) 127/46 (!) 130/57 (!) 89/52   BP Location: Left arm      Patient Position: Lying      Pulse: (!) 46 62 (!) 48 (!) 47   Resp: 14 (!) 25 19 19   Temp:       TempSrc:       SpO2: 100% 97% 97% 98%   Weight:       Height:           Physical Exam:  General: cooperative & interactive, hearing impaired  HEENT: AT/NC, PERRL, EOMI, oral and nasal mucosa moist.   Neck: Supple without JVD or palpable LAD.   Cardiac: normal rate, regular rhythm, with no MRG with brisk cap refill and symmetric pulses in distal extremities.  Respiratory: Normal inspection. Symmetric chest rise. Auscultation clear bilaterally. No increased work of breathing noted.   Abdomen: Soft, tender to palpation greatest in suprapubic region. Non-distended abdomen. +BS. No hepatosplenomegaly.   Extremities: Bilateral pitting edema lower extremities  Neuro: Grossly intact to brief exam. Oriented x3 with appropriate mood/affect to situation.       Laboratory:  No results for input(s): PH, PCO2, PO2, HCO3, POCSATURATED, BE in the last 24 hours.  Recent Labs   Lab 01/11/23  0307   WBC 4.14   RBC 3.93*   HGB 9.7*   HCT 33.0*      MCV 84   MCH 24.7*   MCHC 29.4*     Recent Labs   Lab 01/11/23  0307      K 4.3      CO2 31*   BUN 7*   CREATININE 1.0       Microbiology Data:   Microbiology Results (last 7 days)       Procedure Component Value Units Date/Time    Blood culture #1 **CANNOT BE ORDERED STAT** [[id number]] Collected: 01/08/23 2347    Order Status: Completed Specimen: Blood from Antecubital, Left Arm Updated: 01/11/23 0612     Blood Culture, Routine No Growth to date      No Growth to date      No Growth to date    Narrative:      Collection has been rescheduled by [email or web address] at 01/08/2023 23:18 Reason:    Patient unavailable  Collection has been rescheduled by [email or web address] at 01/08/2023 23:18 Reason:   Patient unavailable    Blood culture #2 **CANNOT BE ORDERED STAT** [[id number]] Collected: 01/09/23 0443    Order Status: Completed Specimen: Blood Updated: 01/11/23 0612     Blood Culture, Routine No Growth to date      No Growth to date    Narrative:      Collection has been rescheduled by [email or web address] at 01/08/2023 23:18 Reason:   Patient unavailable  Collection has been rescheduled by [email or web address] at 01/08/2023 23:55 Reason:   Unable to collect  Collection has been rescheduled by [email or web address] at 01/08/2023 23:18 Reason:   Patient unavailable  Collection has been rescheduled by [email or web address] at 01/08/2023 23:55 Reason:   Unable to collect    Blood culture [[id number]] Collected: 01/10/23 1440    Order Status: Completed Specimen: Blood Updated: 01/11/23 0345     Blood Culture, Routine No Growth to date    Blood culture [[id number]] Collected: 01/10/23 1440    Order Status: Completed Specimen: Blood Updated: 01/11/23 0345     Blood Culture, Routine No Growth to date    Urine culture [[id number]]  (Abnormal) Collected: 01/08/23 2212    Order Status: Completed Specimen: Urine Updated: 01/11/23 0022     Urine Culture, Routine STAPHYLOCOCCUS AUREUS  >100,000cfu/ml  Susceptibility pending  No other significant isolate      Narrative:      Specimen Source->Urine    Urine culture [[id number]] Collected: 01/10/23 1526    Order Status: No result Specimen: Urine Updated: 01/10/23 1544             Chest Imaging:   No new imaging.     Infusions:     NORepinephrine bitartrate-D5W Stopped (01/11/23 0151)    phenylephrine Stopped (01/11/23 0633)       Scheduled Medications:    aspirin  81 mg Oral Daily    atorvastatin  80 mg Oral Daily    enoxaparin  40 mg Subcutaneous Daily    FLUoxetine  60 mg Oral Daily    levothyroxine  137 mcg Oral Before breakfast    linezolid  600 mg Intravenous Q12H    mupirocin   Nasal BID    pantoprazole  40 mg Oral Daily    piperacillin-tazobactam  (ZOSYN) IVPB  4.5 g Intravenous Q8H    polyethylene glycol  17 g Oral Daily    potassium chloride SA  20 mEq Oral BID    senna-docusate 8.6-50 mg  1 tablet Oral BID    traZODone  300 mg Oral QHS       PRN Medications:   sodium chloride 0.9%, sodium chloride 0.9%, sodium chloride 0.9%, acetaminophen, butalbital-acetaminophen-caffeine -40 mg, dextrose 10%, dextrose 10%, glucagon (human recombinant), glucose, glucose, magnesium hydroxide 400 mg/5 ml, melatonin, naloxone, ondansetron, promethazine, QUEtiapine, sodium chloride 0.9%    Assessment & Plan:   Patient Active Problem List   Diagnosis    Generalized anxiety disorder    Sleep apnea    Iron deficiency anemia    Major depressive disorder, recurrent, mild    Chronic pain syndrome    Cervical myelopathy    Narcotic dependency, continuous    Thoracic aorta atherosclerosis    Drug-induced constipation    GERD (gastroesophageal reflux disease)    Coronary artery disease of native artery with stable angina pectoris    Neurogenic bladder    Urinary retention    Frequent falls    Primary hypothyroidism    Lymphedema of both lower extremities    Scoliosis deformity of spine    S/P insertion of spinal cord stimulator    S/P insertion of intrathecal pump    Paresthesia of both lower extremities    Degenerative disc disease, lumbar    Osteoarthritis of spine with radiculopathy, lumbar region    Bradycardia    Bilateral carotid artery disease    Insomnia due to medical condition    Suprapubic catheter    Esophageal dysphagia    Recurrent UTI    Mixed stress and urge urinary incontinence    Inflammatory spondylopathy of lumbar region    Pure hypercholesterolemia    Chronic diastolic heart failure    Partial small bowel obstruction    Constipation due to opioid therapy    Pain in both lower legs    Hypotension    History of stroke with residual deficit    Tremor    UTI (urinary tract infection)    Anxiety    Calcaneal spur of left foot    Charcot ankle, unspecified  laterality       ASSESSMENT & RECOMMENDATIONS     Ms. Hawley is a 67 y.o. F with PMHx of neurogenic bladder s/p suprapubic placement, CHF, and hypothyroidism here in ICU requiring Levophed due to MAP <65. HR dropped to 20s and started on phenylephrine overnight as per eICU. Now back on Levophed and off phenylephrine.    Neuro/Psych  #Anxiety  Continue Fluoxetine  Continue to monitor     #Narcotic dependency, continuous  Chronic back pain s/p multiple surgeries, degenerative disc disease  Currently on home pump infusion, recently increased dose of intrathecal Dilaudid by pain specialist around 3 weeks ago  Continue to monitor  On multiple pain meds at home  Seen by palliative care team     CV   #Bradycardia  Multiple etiologies considered including mediations, hypothyroidism, CECI, uncontrolled UTI sepsis  TSH elevated at 52, Free T4 0.75 (wnl); plan to increase levothyroxine  Medication related: Seroquel 100 mg and trazodone, pt reports she received 2 doses of trazodone (normally one at home) and received 3x tizanidine here but does not normally take it at home  [facility] not on CPAP  Ongoing UTI infection, although less likely septic shock  HR in the 30s, sinus bradycardia, asymptomatic besides fatigue  Cardiology consulted    #Hypotension  BP 81-93/42-55 over last 24 hours, s/p 1.5L total NS bolus  Lasix o/h   Breakthrough pain meds stopped  Likely etiology intrathecal Dilaudid pump v/s UTI sepsis (less likely)  Levophed --> phenylephrine as per eICU, does not help with HR --> currently back on Levo    #Chronic diastolic heart failure  Lasix currently o/h in the setting of hypotension  Echo result from 1/10/23 - EF 50-55%, abnormal septal wall motion, mild LA enlargement, mild TR, mild HI, estimated PA systolic pressure 35 mmHg  1.8L fluid restriction   BNP was 17 five months ago    #Coronary artery disease of native artery with stable angina pectoris  History of CAD with LAD with R-L collaterals  Continue statin and  ASA  Cardiology following    Pulm  #Obstructive sleep apnea  As per family, pt does not use CPAP at home  Pt has not used CPAP while in hospital; refused overnight  ABG ordereed  Never smoker    FEN/GI  Diet: Cardiac diet  Electrolytes are wnl  Fluid restriction  Keep Mg 2, K 4    RENAL  I/O last 3 completed shifts:  In: 3329.1 [P.O.:1672; I.V.:103.7; IV Piggyback:1553.4]  Out: 4635 [Urine:4635]  No intake/output data recorded.     Intake/Output Summary (Last 24 hours) at 1/11/2023 0807  Last data filed at 1/11/2023 0639  Gross per 24 hour   Intake 1940.77 ml   Output 4085 ml   Net -2144.23 ml      Strict I&Os  Avoid renal toxic meds  BUN/Cr: 7/1, at baseline  Continue to monitor renal status and urine output    #Suprapubic catheter  Recently exchanged, exchanged every 3 days  Continue to monitor urine output  Urology consulted to replace on 1/9/23 - now with clear yellow UOP, hematuria resolved     Heme  H/H 9.7/33; stable  WBC 4  DVT prophylaxis: Lovenox     #Iron deficiency anemia  Continue iron supplementation     Endo  Maintain glucose 140-180  SSI  HgbA1C: 5.9  TSH: 52    #Primary hypothyroidism  Continue home thyroid medication; increase dose as TSH high  TSH elevated at 52, Free T4 0.75 (wnl)    ID  Temp:  [96.7 °F (35.9 °C)-98.2 °F (36.8 °C)]    Continue ABX - on Zosyn and Linezolid since 1/10, switch back to Ancef today  Urinalysis: notable pyuria, bacteria   Blood cultures NGTD x3  Urine culture ordered 1/10     #UTI (urinary tract infection)  UA/UC from 01/06 showed staph aureus, sensitive to oxacillin and Bactrim  Repeat UA and UC from 01/08 - staph aureus  S/p clindamycin in ED  S/p Augmentin for a total 5-7 day course  Changed to Ancef on 1/9  On Zosyn and Linezolid since 1/10  Urine culture from 1/8/23 resulted today with Staphylococcus schleiferi  Switch to Ancef today  ID consulted      DVT PPx: Lovenox  Diet: Cardiac diet  GI PPX: Pantoprazole  Deconditioning: PT/OT  Code Status:  Full      Dispo  Pt to remain in ICU while weaning Levophed, can step down when medically stable.    Thank you for allowing us to participate in the care of this patient. Please call with questions.      Paola Ballard MD, MPH  \[facility]\"" Family Medicine PGY-1  \[facility]\"" Pulmonary/Critical Care   01/11/2023

## 2023-01-11 NOTE — CARE UPDATE
Evaluated pt due to complaints of CP, Left-sided numbness, and Left shoulder pain. Full neuro exam completed and was unremarkable. Reassured pt. EKG showing sinus bradycardia. HR 43. Pt agrees to trying heat pack for Left shoulder.      Paola Ballard MD, MPH  [facility] PGY-1  [facility]   01/11/2023

## 2023-01-11 NOTE — ASSESSMENT & PLAN NOTE
- HR 39-50 per Epic overnight; telemetry reviewed with lowest HR noted to 39 SB with 2 second pause not overly concerning; patient asymptomatic from bradycardia  - low HR chronic and multifactoral at this point   - not on BB, CCB or other AV mir blockers as an outpatient and will continue to avoid for now   - baseline outpatient HR per Epic review 42-63; EKG with no acute issues  -  no indication for pacemaker at this time; avoid AV mir blockers and monitor on telemetry while admitted

## 2023-01-11 NOTE — ASSESSMENT & PLAN NOTE
· Lasix currently o/h in the setting of hypotension  · Dose given in ICU - good UOP  · Echo pending  · Monitor  · 1.8L fluid restriction

## 2023-01-11 NOTE — PROGRESS NOTES
[facility]  Progress Note    Patient Name: Tasha Hawley  MRN: [id number]  Patient Class: IP- Inpatient   Admission Date: 1/8/2023  Length of Stay: 1 days  Attending Physician: Justyna Hinton*  Primary Care Provider: Mesfin Hodges Ii, MD        Subjective:     Principal Problem:UTI (urinary tract infection)        HPI:  Tasha Villalpando is a 67yr female with PMH Neurogenic bladder s/p suprapubic placement, CHF, hypothyroidism who presents with decreased urine output from catheter and hematuria.    Pt states that she has had pain from suprapubic catheter, with decreased urine output and hematuria. Catheter is changed every three days, per patient. She states she recently had an oupatient urine cultures positive for staph, but pending susceptibilities. She reports allergies to vancomycin and bactrium.    In the ED, initial triage vitals were significant for slightly low Bps. CBC showed microcytic anemia. BMP showed hyponatremia and hypokalemia. UA showed several WBC and RBC with moderate bacteria. UC from 01/06 resulted showing oxacillin and bactrium sensitive staph aureus. BC and UC from 01/08 are pending.    While in the ED, pt complained of lower abdominal pain and decreased output, with apparent clot noted on flushing. With manipulation, provider repositioned catheter, quickly obtaining 300cc of output shortly afterwards. Pt  was started on clindamycin for UTI.    Medicine accepted pt for UTI.      Overview/Hospital Course:  1/10: Patient has been running a consistently low BP and HR over the last 24 hours. She is fatigued but otherwise asymptomatic. She received a 1x bolus of 500 ml NS and a 1x bolus of 1000 ml NS. Per patient and her , BP is regularly low but not quite this low. However, she did just have her intrathecal dilaudid pump increased so that may be the culprit. Lasix is currently o/h and in the setting of CHF do not want to give anymore IVF at this  time. Also do not want to give midodrine at this time given bradycardia. Cardiology consulted regarding bradycardia as low as 40 bpm. Appreciate their recommendations.    1/11: Patient moved to 1/10 to start pressors and for increased monitoring d/t increasingly decreased HR and bradycardia partnered with increased lethargy (falling asleep while eating and talking). Patient seen during ICU rounding and BP significantly improved while weaning off Levophed gtt. HR still low but patient awake, alert, and participating in conversation appropriately. Attempted to contact patient's outpatient provider who manages her intrathecal dilaudid pump about possibly decreasing dose, as this is thought to possibly be a cause of patient's hypotension and bradycardia. Left voicemail. Will continue weaning off levophed gtt.      Interval History: Patient seen at bedside during ICU rounds. BP is much improved while weaning off Levophed gtt. Patient's HR still low but patient asymptomatic. She has been c/o CP off and on but trops NL and no ischemic changes on EKG.    Review of Systems   Constitutional:  Negative for fatigue and fever.   HENT:  Negative for trouble swallowing.    Respiratory:  Negative for shortness of breath.    Cardiovascular:  Positive for chest pain and leg swelling. Negative for palpitations.   Gastrointestinal:  Negative for abdominal pain.   Genitourinary:         Suprapubic catheter   Musculoskeletal:  Positive for back pain, gait problem and myalgias.   Neurological:  Positive for weakness. Negative for dizziness, light-headedness, numbness and headaches.   Psychiatric/Behavioral:  Negative for agitation and confusion. The patient is not nervous/anxious.    Objective:     Vital Signs (Most Recent):  Temp: 97.7 °F (36.5 °C) (01/11/23 1130)  Pulse: (!) 43 (01/11/23 1230)  Resp: 15 (01/11/23 1230)  BP: (!) 115/56 (01/11/23 1230)  SpO2: 99 % (01/11/23 1230)   Vital Signs (24h Range):  Temp:  [97 °F (36.1 °C)-98.2 °F  (36.8 °C)] 97.7 °F (36.5 °C)  Pulse:  [37-62] 43  Resp:  [13-36] 15  SpO2:  [87 %-100 %] 99 %  BP: ()/(33-66) 115/56     Weight: 85.7 kg (189 lb)  Body mass index is 29.6 kg/m².    Intake/Output Summary (Last 24 hours) at 2023 1353  Last data filed at 2023 0639  Gross per 24 hour   Intake 1940.77 ml   Output 4085 ml   Net -2144.23 ml      Physical Exam  Vitals and nursing note reviewed.   Constitutional:       General: She is not in acute distress.     Appearance: She is obese.   HENT:      Head: Normocephalic and atraumatic.   Eyes:      Pupils: Pupils are equal, round, and reactive to light.   Cardiovascular:      Rate and Rhythm: Regular rhythm. Bradycardia present.      Pulses:           Dorsalis pedis pulses are 1+ on the right side and 1+ on the left side.      Heart sounds: Normal heart sounds.   Pulmonary:      Effort: Pulmonary effort is normal. No respiratory distress.      Breath sounds: Decreased breath sounds present.   Abdominal:      General: Bowel sounds are normal. There is no distension.      Palpations: Abdomen is soft.      Tenderness: There is no abdominal tenderness.   Genitourinary:     Comments: Suprapubic catheter  Musculoskeletal:      Right lower le+ Edema present.      Left lower le+ Edema present.   Skin:     General: Skin is warm.   Neurological:      Mental Status: She is alert and oriented to person, place, and time.      Motor: Weakness present.   Psychiatric:         Mood and Affect: Mood normal.         Behavior: Behavior normal.         Thought Content: Thought content normal.         Judgment: Judgment normal.       Significant Labs: All pertinent labs within the past 24 hours have been reviewed.    Significant Imaging: I have reviewed all pertinent imaging results/findings within the past 24 hours.      Assessment/Plan:      * UTI (urinary tract infection)  · ->UA/UC from  showed staph aureus, sensitive to oxacillin and bacterium  · ->repeat UA and  UC from 01/08 - staph aureus  · ->s/p clindamycin in ED.   · ->s/p augmentin for a total 5-7 day course  · Changed to ancef 1/9  · UC 1/8 - staph schleiferi  · Patient had been changed to broad spectrum abx in the setting of possible sepsis 1/10  · Unlikely cause of bradycardia and hypotension  · Lactate NL, afebrile, WBC NL  · Changed back to ancef 1/10  · ID consult placed for chronic UTIs      Anxiety  · C/w fluoxetine dose      Hypotension  · BP 81-93/42-55 over last 24 hours  · S/p 1.5L total NS bolus   · Lasix o/h   · Given this and setting of CHF, do not want to overload w/ IVF  · Patient moved to ICU 1/10 for Levophed gtt  · BP stable and weaning off  · Breakthrough pain meds stopped  · Likely etiology intrathecal dilaudid pump vs UTI sepsis (unlikely) vs hypothyroid vs polypharmacy  · Attempted to call Dr. Hillman (patient's pain medicine provider) regarding possibly decreasing dilaudid pump dose, no answer so voicemail left      Constipation due to opioid therapy  · C/w aggressive bowel regimen       Chronic diastolic heart failure  · Lasix currently o/h in the setting of hypotension  · Dose given in ICU - good UOP  · Echo pending  · Monitor  · 1.8L fluid restriction      Suprapubic catheter  · Recently exchanged. Exchanged every 3 days  · ED provider repositioned it this admission  · Continue to monitor urine output  · Urology consulted to replace 1/9 - now clear yellow UOP        Bradycardia  · HR as low as 39 bpm 1/10 - patient asymptomatic except for fatigue but became more lethargic as the day progressed   · Patient moved to ICU for pressers and HR dropped as low as 20s  · Patient back up into 30s-40s this AM and patient asymptomatic  · Cardiology following - see recs  · Echo performed- see report  · Maintain telemetry      Primary hypothyroidism  · TSH 51.871  · T4 0.75  · Synthroid increased to 150 mg/d      Coronary artery disease of native artery with stable angina pectoris  · History of CAD with  "LAD with R-L collaterals  · Continue statin and ASA  · No hematuria present so will continue ASA  · Patient having off and on CP  · Trops negative and EKG shows no ischemic changes  · Cardiology following - see recs  · recommend medical management although limited given hematuria; ASA when cleared by Urology; recommend statin but no BB given baseline bradycardia; could use Imdur starting at 30 mg but will hold off given marginal BP and pressor use; currently chest pain free   · echo yesterday with EF 50-55%; no need for further cardiac workup at this time; recommend cardiology follow up as an outpatient         Narcotic dependency, continuous  Currently on home pump infusion        Iron deficiency anemia  May continue iron supplementation  May consider iron/ferritin labs      Sleep apnea  · Patient and daughter state she was never "officially diagnosed" w/ CECI and she doesn't wear CPAP  · ABG on RA: pO2 57; pCO2 53.9; pH 7.397;  HCO3 33.2, %O2 Sat 88          VTE Risk Mitigation (From admission, onward)         Ordered     Place MALLORIE hose  Until discontinued         01/10/23 1309     enoxaparin injection 40 mg  Daily         01/09/23 0107     IP VTE HIGH RISK PATIENT  Once         01/09/23 0107     Place sequential compression device  Until discontinued         01/09/23 0107                Discharge Planning   JACKIE:      Code Status: Full Code   Is the patient medically ready for discharge?:     Reason for patient still in hospital (select all that apply): Patient trending condition, Treatment and Consult recommendations  Discharge Plan A: Home Health (the pt's current with [facility])   Discharge Delays: None known at this time        Critical care time spent on the evaluation and treatment of severe organ dysfunction, review of pertinent labs and imaging studies, discussions with consulting providers and discussions with patient/family: 35 minutes.      Lisa Carrasco NP  Department of Hospital " [facility] - Intensive Care

## 2023-01-11 NOTE — ASSESSMENT & PLAN NOTE
"· Patient and daughter state she was never "officially diagnosed" w/ CECI and she doesn't wear CPAP  · ABG on RA: pO2 57; pCO2 53.9; pH 7.397;  HCO3 33.2, %O2 Sat 88      "

## 2023-01-12 ENCOUNTER — TELEPHONE (OUTPATIENT)
Dept: UROLOGY | Facility: CLINIC | Age: 68
End: 2023-01-12
Payer: MEDICAID

## 2023-01-12 LAB
ANION GAP SERPL CALC-SCNC: 6 MMOL/L (ref 8–16)
BASOPHILS # BLD AUTO: 0.01 K/UL (ref 0–0.2)
BASOPHILS NFR BLD: 0.2 % (ref 0–1.9)
BUN SERPL-MCNC: 7 MG/DL (ref 8–23)
CALCIUM SERPL-MCNC: 8.7 MG/DL (ref 8.7–10.5)
CHLORIDE SERPL-SCNC: 103 MMOL/L (ref 95–110)
CO2 SERPL-SCNC: 33 MMOL/L (ref 23–29)
CREAT SERPL-MCNC: 0.9 MG/DL (ref 0.5–1.4)
DIFFERENTIAL METHOD: ABNORMAL
EOSINOPHIL # BLD AUTO: 0.3 K/UL (ref 0–0.5)
EOSINOPHIL NFR BLD: 7.5 % (ref 0–8)
ERYTHROCYTE [DISTWIDTH] IN BLOOD BY AUTOMATED COUNT: 15.5 % (ref 11.5–14.5)
EST. GFR  (NO RACE VARIABLE): >60 ML/MIN/1.73 M^2
FERRITIN SERPL-MCNC: 57 NG/ML (ref 20–300)
GLUCOSE SERPL-MCNC: 95 MG/DL (ref 70–110)
HCT VFR BLD AUTO: 31.5 % (ref 37–48.5)
HGB BLD-MCNC: 9.3 G/DL (ref 12–16)
IMM GRANULOCYTES # BLD AUTO: 0.01 K/UL (ref 0–0.04)
IMM GRANULOCYTES NFR BLD AUTO: 0.2 % (ref 0–0.5)
IRON SERPL-MCNC: 23 UG/DL (ref 30–160)
LYMPHOCYTES # BLD AUTO: 1.4 K/UL (ref 1–4.8)
LYMPHOCYTES NFR BLD: 32.2 % (ref 18–48)
MAGNESIUM SERPL-MCNC: 2.1 MG/DL (ref 1.6–2.6)
MCH RBC QN AUTO: 24.9 PG (ref 27–31)
MCHC RBC AUTO-ENTMCNC: 29.5 G/DL (ref 32–36)
MCV RBC AUTO: 85 FL (ref 82–98)
MONOCYTES # BLD AUTO: 0.5 K/UL (ref 0.3–1)
MONOCYTES NFR BLD: 10.7 % (ref 4–15)
NEUTROPHILS # BLD AUTO: 2.2 K/UL (ref 1.8–7.7)
NEUTROPHILS NFR BLD: 49.2 % (ref 38–73)
NRBC BLD-RTO: 0 /100 WBC
PLATELET # BLD AUTO: 217 K/UL (ref 150–450)
PMV BLD AUTO: 10.2 FL (ref 9.2–12.9)
POTASSIUM SERPL-SCNC: 4.9 MMOL/L (ref 3.5–5.1)
RBC # BLD AUTO: 3.73 M/UL (ref 4–5.4)
SATURATED IRON: 8 % (ref 20–50)
SODIUM SERPL-SCNC: 142 MMOL/L (ref 136–145)
TOTAL IRON BINDING CAPACITY: 306 UG/DL (ref 250–450)
TRANSFERRIN SERPL-MCNC: 207 MG/DL (ref 200–375)
WBC # BLD AUTO: 4.41 K/UL (ref 3.9–12.7)

## 2023-01-12 PROCEDURE — 27000221 HC OXYGEN, UP TO 24 HOURS: Mod: HCNC

## 2023-01-12 PROCEDURE — 20000000 HC ICU ROOM: Mod: HCNC

## 2023-01-12 PROCEDURE — 82728 ASSAY OF FERRITIN: CPT | Mod: HCNC | Performed by: NURSE PRACTITIONER

## 2023-01-12 PROCEDURE — 94761 N-INVAS EAR/PLS OXIMETRY MLT: CPT | Mod: HCNC

## 2023-01-12 PROCEDURE — 27000190 HC CPAP FULL FACE MASK W/VALVE: Mod: HCNC

## 2023-01-12 PROCEDURE — 94660 CPAP INITIATION&MGMT: CPT | Mod: HCNC

## 2023-01-12 PROCEDURE — 36415 COLL VENOUS BLD VENIPUNCTURE: CPT | Mod: HCNC | Performed by: STUDENT IN AN ORGANIZED HEALTH CARE EDUCATION/TRAINING PROGRAM

## 2023-01-12 PROCEDURE — 99900035 HC TECH TIME PER 15 MIN (STAT): Mod: HCNC

## 2023-01-12 PROCEDURE — 83735 ASSAY OF MAGNESIUM: CPT | Mod: HCNC | Performed by: STUDENT IN AN ORGANIZED HEALTH CARE EDUCATION/TRAINING PROGRAM

## 2023-01-12 PROCEDURE — 25000003 PHARM REV CODE 250: Mod: HCNC | Performed by: STUDENT IN AN ORGANIZED HEALTH CARE EDUCATION/TRAINING PROGRAM

## 2023-01-12 PROCEDURE — 99232 SBSQ HOSP IP/OBS MODERATE 35: CPT | Mod: HCNC,GC,, | Performed by: INTERNAL MEDICINE

## 2023-01-12 PROCEDURE — 97535 SELF CARE MNGMENT TRAINING: CPT | Mod: HCNC

## 2023-01-12 PROCEDURE — 80048 BASIC METABOLIC PNL TOTAL CA: CPT | Mod: HCNC | Performed by: NURSE PRACTITIONER

## 2023-01-12 PROCEDURE — 97110 THERAPEUTIC EXERCISES: CPT | Mod: HCNC

## 2023-01-12 PROCEDURE — 97530 THERAPEUTIC ACTIVITIES: CPT | Mod: HCNC

## 2023-01-12 PROCEDURE — 99232 PR SUBSEQUENT HOSPITAL CARE,LEVL II: ICD-10-PCS | Mod: HCNC,GC,, | Performed by: INTERNAL MEDICINE

## 2023-01-12 PROCEDURE — 63600175 PHARM REV CODE 636 W HCPCS: Mod: HCNC | Performed by: STUDENT IN AN ORGANIZED HEALTH CARE EDUCATION/TRAINING PROGRAM

## 2023-01-12 PROCEDURE — 84466 ASSAY OF TRANSFERRIN: CPT | Mod: HCNC | Performed by: NURSE PRACTITIONER

## 2023-01-12 PROCEDURE — 85025 COMPLETE CBC W/AUTO DIFF WBC: CPT | Mod: HCNC | Performed by: NURSE PRACTITIONER

## 2023-01-12 RX ADMIN — MUPIROCIN: 20 OINTMENT TOPICAL at 09:01

## 2023-01-12 RX ADMIN — CEFAZOLIN SODIUM 2 G: 2 SOLUTION INTRAVENOUS at 05:01

## 2023-01-12 RX ADMIN — CEFAZOLIN SODIUM 2 G: 2 SOLUTION INTRAVENOUS at 02:01

## 2023-01-12 RX ADMIN — ATORVASTATIN CALCIUM 80 MG: 40 TABLET, FILM COATED ORAL at 09:01

## 2023-01-12 RX ADMIN — TRAZODONE HYDROCHLORIDE 300 MG: 100 TABLET ORAL at 09:01

## 2023-01-12 RX ADMIN — FLUOXETINE 60 MG: 20 CAPSULE ORAL at 09:01

## 2023-01-12 RX ADMIN — ENOXAPARIN SODIUM 40 MG: 40 INJECTION SUBCUTANEOUS at 05:01

## 2023-01-12 RX ADMIN — QUETIAPINE FUMARATE 25 MG: 25 TABLET ORAL at 09:01

## 2023-01-12 RX ADMIN — PANTOPRAZOLE SODIUM 40 MG: 40 TABLET, DELAYED RELEASE ORAL at 09:01

## 2023-01-12 RX ADMIN — POTASSIUM CHLORIDE 20 MEQ: 1500 TABLET, EXTENDED RELEASE ORAL at 09:01

## 2023-01-12 RX ADMIN — ASPIRIN 81 MG: 81 TABLET, COATED ORAL at 09:01

## 2023-01-12 RX ADMIN — Medication 0.02 MCG/KG/MIN: at 01:01

## 2023-01-12 RX ADMIN — POLYETHYLENE GLYCOL 3350 17 G: 17 POWDER, FOR SOLUTION ORAL at 09:01

## 2023-01-12 RX ADMIN — CEFAZOLIN SODIUM 2 G: 2 SOLUTION INTRAVENOUS at 09:01

## 2023-01-12 RX ADMIN — DOCUSATE SODIUM - SENNOSIDES 1 TABLET: 50; 8.6 TABLET, FILM COATED ORAL at 09:01

## 2023-01-12 NOTE — PLAN OF CARE
Care Plan    POC reviewed with Tasha Hawley and family. Questions and concerns addressed. No acute events today. Turned on Levo from [id number] to sustain map of atleast 60. Pt maintained MAP after turning  Levo off. Slept with CPAP mask on tonight and tolerated very well. Pt progressing toward goals. Will continue to monitor. See below and flowsheets for full assessment and VS info.       Neuro:  Erhard Coma Scale  Best Eye Response: 4-->(E4) spontaneous  Best Motor Response: 6-->(M6) obeys commands  Best Verbal Response: 5-->(V5) oriented  Jessica Coma Scale Score: 15  Pupil PERRLA: yes  24 hr Temp:  [97.3 °F (36.3 °C)-98.1 °F (36.7 °C)]      CV:  Rhythm: sinus bradycardia  DVT prophylaxis: VTE Required Core Measure: (SCDs) Sequential compression device initiated/maintained    Resp:     Oxygen Concentration (%): 40    GI/:  GI prophylaxis: no  Diet/Nutrition Received: 2 gram sodium, low saturated fat/low cholesterol, restrict fluids  Last Bowel Movement: 01/08/23  Voiding Characteristics: suprapubic catheter   Intake/Output Summary (Last 24 hours) at 1/12/2023 0438  Last data filed at 1/12/2023 0436  Gross per 24 hour   Intake 2037.95 ml   Output 2320 ml   Net -282.05 ml       Labs/Accuchecks:  Recent Labs   Lab 01/12/23  0418   WBC 4.41   RBC 3.73*   HGB 9.3*   HCT 31.5*         Recent Labs   Lab 01/08/23  2347 01/09/23  0443 01/11/23  0307      < > 139   K 3.2*   < > 4.3   CO2 31*   < > 31*   CL 95   < > 100   BUN 12   < > 7*   CREATININE 1.1   < > 1.0   ALKPHOS 105  --   --    ALT 5*  --   --    AST 10  --   --    BILITOT 0.6  --   --     < > = values in this interval not displayed.    No results for input(s): PROTIME, INR, APTT, HEPANTIXA in the last 168 hours.   Recent Labs   Lab 01/10/23  1258   TROPONINI 0.013       Electrolytes: N/A - electrolytes WDL  Accuchecks: none    Gtts/LDAs:   NORepinephrine bitartrate-D5W Stopped (01/12/23 0227)       Lines/Drains/Airways       Drain   Duration                  Suprapubic Catheter 12/13/22 100% silicone 20 Fr. 30 days              Line  Duration                  Subcutaneous Infusion Pump Abdomen (Comment) -- days              Peripheral Intravenous Line  Duration                  Peripheral IV - Single Lumen 01/09/23 0020 Right Antecubital 3 days         Peripheral IV - Single Lumen 01/10/23 1710 20 G Anterior;Right Upper Arm 1 day                    Skin/Wounds  Bathing/Skin Care: other (see comments) (hands and face wiped) (01/10/23 1625)  Wounds: No  Wound care consulted: No    Consults  Consults (From admission, onward)          Status Ordering Provider     Inpatient consult to Infectious Diseases  Once        Provider:  Alberto Rodriguez MD    Completed CHIDI HENRIQUEZ     [facility]  Once        Provider:  Nick Lozano MD    Completed CHIDI HENRIQUEZ     Inpatient consult to Palliative Care  Once        Provider:  Robert Stein Jr., MD    Completed CHIDI HENRIQUEZ     Inpatient consult to Urology  Once        Provider:  (Not yet assigned)    Completed CHIDI HENRIQUEZ     IP consult to case management  Once        Provider:  (Not yet assigned)    Completed CHRISTINE SWANSON

## 2023-01-12 NOTE — PROGRESS NOTES
[facility] Infectious Diseases Progress Note    Assessment and Recommendations:      Tasha Hawley is a 67 y.o. female with history of neurogenic bladder s/p suprapubic placement (c/b recurrent UTIs incl ESBL), chronic back pain, CHF, and hypothyroidism admitted with septic shock 2/2 UTI. On admission, afebrile though hypotensive. WBC 7 with normal lactic acid. Started on clindamycin for Staph UTI per primary team. 1/10 Transferred to MICU for hypotension, started on pressor support.  and  Ucx with Staph schleiferi/MSSA, sensitive to oxacillin and bactrim (though patient reports allergy to bactrim and vanc). Currently on cefazolin, off of pressors 1/12 AM.    RECOMMENDATIONS:   - continue cefazolin for now, can de-escalate on discharge to keflex 500 QID for total 14 day course (end date: 2023).  - recommend urology evaluation as patient reporting pain and leakage about catheter     We will sign off at this time. Thank you for allowing us to participate in the care of this patient. Please contact me if you have any questions regarding this consult.    Maria Isabel Vu MD  [facility] Infectious Diseases, PGY-4  Cell: [phone]       Subjective:      Patient clinically improved and afebrile overnight. Off of pressors as of 1am.      Objective:   Last 24 Hour Vital Signs:  BP  Min: 73/39  Max: 124/59  Temp  Av.8 °F (36.6 °C)  Min: 97.3 °F (36.3 °C)  Max: 98.2 °F (36.8 °C)  Pulse  Av.7  Min: 42  Max: 57  Resp  Av.6  Min: 10  Max: 35  SpO2  Av.9 %  Min: 90 %  Max: 100 %  I/O last 3 completed shifts:  In: 3061.8 [P.O.:1891; I.V.:283.4; IV Piggyback:887.3]  Out: 3805 [Urine:3805]    Physical Exam  Vitals reviewed.   Constitutional:       General: She is not in acute distress.     Appearance: She is ill-appearing. She is not toxic-appearing or diaphoretic.   HENT:      Head: Normocephalic and atraumatic.      Nose: Nose normal.      Mouth/Throat:      Mouth: Mucous membranes are moist.       Pharynx: Oropharynx is clear. No oropharyngeal exudate.   Eyes:      Extraocular Movements: Extraocular movements intact.      Conjunctiva/sclera: Conjunctivae normal.      Pupils: Pupils are equal, round, and reactive to light.   Cardiovascular:      Rate and Rhythm: Regular rhythm. Bradycardia present.      Pulses: Normal pulses.      Heart sounds: No murmur heard.    No gallop.   Pulmonary:      Effort: Pulmonary effort is normal. No respiratory distress.      Breath sounds: Normal breath sounds. No wheezing or rales.   Abdominal:      General: Abdomen is flat. Bowel sounds are normal. There is no distension.      Palpations: Abdomen is soft.      Tenderness: There is abdominal tenderness. There is no guarding.      Comments: SPC in place   Musculoskeletal:         General: Swelling present. Normal range of motion.      Cervical back: Normal range of motion and neck supple.   Skin:     General: Skin is warm.      Capillary Refill: Capillary refill takes less than 2 seconds.      Coloration: Skin is not jaundiced.      Findings: No bruising or lesion.   Neurological:      General: No focal deficit present.      Mental Status: She is alert.       Laboratory Results:  Most Recent Data:  CBC:   Lab Results   Component Value Date    WBC 4.41 01/12/2023    HGB 9.3 (L) 01/12/2023    HCT 31.5 (L) 01/12/2023     01/12/2023    MCV 85 01/12/2023    RDW 15.5 (H) 01/12/2023     BMP:   Lab Results   Component Value Date     01/12/2023    K 4.9 01/12/2023     01/12/2023    CO2 33 (H) 01/12/2023    BUN 7 (L) 01/12/2023    GLU 95 01/12/2023    CALCIUM 8.7 01/12/2023    MG 2.1 01/12/2023    PHOS 2.5 (L) 04/21/2022     LFTs:   Lab Results   Component Value Date    PROT 7.6 01/08/2023    ALBUMIN 3.4 (L) 01/08/2023    BILITOT 0.6 01/08/2023    AST 10 01/08/2023    ALKPHOS 105 01/08/2023    ALT 5 (L) 01/08/2023    GGT 51 04/30/2015     Coags:   Lab Results   Component Value Date    INR 1.0 01/14/2021     FLP:   Lab  Results   Component Value Date    CHOL 141 04/05/2022    HDL 52 04/05/2022    LDLCALC 75.8 04/05/2022    TRIG 66 04/05/2022    CHOLHDL 36.9 04/05/2022     DM:   Lab Results   Component Value Date    HGBA1C 5.9 (H) 07/28/2022    HGBA1C 5.0 07/07/2021    HGBA1C 5.0 01/14/2021    LDLCALC 75.8 04/05/2022    CREATININE 0.9 01/12/2023     Thyroid:   Lab Results   Component Value Date    TSH 51.871 (H) 01/10/2023    FREET4 0.75 01/10/2023    X3IFYJI 5.2 05/17/2020     Anemia:   Lab Results   Component Value Date    IRON 92 06/10/2021    TIBC 330 06/10/2021    FERRITIN 121 06/10/2021    QDXKOVXX19 175 (L) 06/10/2021    FOLATE 4.9 06/12/2021     Cardiac:   Lab Results   Component Value Date    TROPONINI 0.013 01/10/2023    BNP 17 07/27/2022     Urinalysis:   Lab Results   Component Value Date    LABURIN (A) 01/08/2023     STAPHYLOCOCCUS SCHLEIFERI  >100,000cfu/ml  No other significant isolate      COLORU Yellow 01/10/2023    SPECGRAV 1.020 01/10/2023    NITRITE Negative 01/10/2023    KETONESU Negative 01/10/2023    UROBILINOGEN Negative 01/10/2023       Trended Lab Data:  Recent Labs   Lab 01/08/23  2347 01/08/23  2348 01/10/23  0334 01/11/23  0307 01/12/23  0418   WBC  --    < > 5.29 4.14 4.41   HGB  --    < > 8.5* 9.7* 9.3*   HCT  --    < > 28.5* 33.0* 31.5*   PLT  --    < > 174 201 217   MCV  --    < > 82 84 85   RDW  --    < > 15.7* 15.6* 15.5*      < > 139 139 142   K 3.2*   < > 4.4 4.3 4.9   CL 95   < > 104 100 103   CO2 31*   < > 29 31* 33*   BUN 12   < > 10 7* 7*   GLU 90   < > 93 100 95   PROT 7.6  --   --   --   --    ALBUMIN 3.4*  --   --   --   --    BILITOT 0.6  --   --   --   --    AST 10  --   --   --   --    ALKPHOS 105  --   --   --   --    ALT 5*  --   --   --   --     < > = values in this interval not displayed.           Microbiology Data:  Microbiology Results (last 7 days)       Procedure Component Value Units Date/Time    Blood culture #1 **CANNOT BE ORDERED STAT** [[id number]] Collected: 01/08/23  2347    Order Status: Completed Specimen: Blood from Antecubital, Left Arm Updated: 01/12/23 0612     Blood Culture, Routine No Growth to date      No Growth to date      No Growth to date      No Growth to date    Narrative:      Collection has been rescheduled by [email or web address] at 01/08/2023 23:18 Reason:   Patient unavailable  Collection has been rescheduled by [email or web address] at 01/08/2023 23:18 Reason:   Patient unavailable    Blood culture #2 **CANNOT BE ORDERED STAT** [[id number]] Collected: 01/09/23 0443    Order Status: Completed Specimen: Blood Updated: 01/12/23 0612     Blood Culture, Routine No Growth to date      No Growth to date      No Growth to date    Narrative:      Collection has been rescheduled by [email or web address] at 01/08/2023 23:18 Reason:   Patient unavailable  Collection has been rescheduled by [email or web address] at 01/08/2023 23:55 Reason:   Unable to collect  Collection has been rescheduled by [email or web address] at 01/08/2023 23:18 Reason:   Patient unavailable  Collection has been rescheduled by [email or web address] at 01/08/2023 23:55 Reason:   Unable to collect    Blood culture [[id number]] Collected: 01/10/23 1440    Order Status: Completed Specimen: Blood Updated: 01/11/23 2212     Blood Culture, Routine No Growth to date      No growth to date      No Growth to date    Blood culture [[id number]] Collected: 01/10/23 1440    Order Status: Completed Specimen: Blood Updated: 01/11/23 2212     Blood Culture, Routine No Growth to date      No growth to date      No Growth to date    Urine culture [[id number]]  (Abnormal)  (Susceptibility) Collected: 01/08/23 2212    Order Status: Completed Specimen: Urine Updated: 01/11/23 0853     Urine Culture, Routine STAPHYLOCOCCUS SCHLEIFERI  >100,000cfu/ml  No other significant isolate      Narrative:      Specimen Source->Urine    Urine culture [[id number]] Collected: 01/10/23 1526    Order Status: No result Specimen: Urine Updated: 01/10/23 1544              Antimicrobials:  Clindamycin, 1/8  Augmentin, 1/9  Cefazolin, 1/9 -  1/10  Linezolid, 1/10  Pip-tazo, 1/10     Other Results:  Radiology:  X-Ray Chest AP Portable    Result Date: 1/10/2023  EXAMINATION: XR CHEST AP PORTABLE CLINICAL HISTORY: chest pain; TECHNIQUE: Single frontal portable view of the chest was performed.  Patient is imaged in severe dextro rotation, significantly limiting evaluation for COMPARISON: Chest radiograph 07/27/2022 FINDINGS: Moderate enlargement of the cardiac silhouette which may reflect cardiomegaly and or pericardial effusion does not appear to be significantly changed on this limited exam. Widening of the mediastinal silhouette is likely projectional related to suboptimal patient positioning however, true mediastinal pathology cannot be excluded. Symmetric low lung volumes with associated bilateral perihilar and bibasilar airspace opacities.  No overt interstitial edema, sizable pleural effusion or pneumothorax. Multilevel degenerative changes of the imaged spine.  Spinal stimulator device projects over the lower thoracic/upper lumbar spine, not significantly changed.     1. Moderate enlargement of the cardiac silhouette which may reflect cardiomegaly and or pericardial effusion does not appear to be significantly changed on this limited exam. 2. Widening of the mediastinal silhouette is likely projectional related to suboptimal patient positioning however, true mediastinal pathology cannot be excluded. 3. Symmetric low lung volumes with associated bilateral perihilar and bibasilar airspace opacities which may reflect compressive atelectasis however, underlying pulmonary infection cannot be excluded. Electronically signed by: Nino Painter Date:    01/10/2023 Time:    14:50    Echo    Result Date: 1/10/2023  · Concentric remodeling and normal systolic function. · The estimated ejection fraction is 50-55%. · There is abnormal septal wall motion. · Normal left ventricular diastolic function. · Normal right ventricular size with normal right ventricular  systolic function. · Mild left atrial enlargement. · Mild tricuspid regurgitation. · Mild pulmonic regurgitation. · Intermediate central venous pressure (8 mmHg). · The estimated PA systolic pressure is 35 mmHg.      ED US Guided Misc Procedure    Result Date: 1/9/2023  Jose Rand MD     1/9/2023  4:41 AM ED US Guided Misc Procedure Date/Time: 1/9/2023 12:09 AM Performed by: Jose Rand MD Authorized by: Joes Rand MD Procedure:  Ultrasound-guided peripheral venous cannulation Indication:  Failed or difficult IV access (attempts failed by multiple nurses)  Right  Antecubital Procedure:  Dynamic ultrasound guidance used, Candidate vein examined with linear probe - confirmed collapsibility, lack of pulsatility, and proper anatomic location. and Using aseptic technique, IV catheter inserted with flash of blood noted, flow of venous blood confirmed. Catheter gauge:  20  Flushes easily and without pain. Patient tolerated procedure well. Complications:  None Charge?:  Yes

## 2023-01-12 NOTE — PLAN OF CARE
Problem: Physical Therapy  Goal: Physical Therapy Goal  Description: Goals to be met by: 23     Patient will increase functional independence with mobility by performin. Supine to sit with Stand-by Assistance  2. Sit to supine with Stand-by Assistance  3. Sit to stand transfer with Stand-by Assistance  4. Bed to chair transfer with Stand-by Assistance using Rolling Walker  5. Gait  x 50 feet with Stand-by Assistance using Rolling Walker.     Outcome: Ongoing, Progressing     Pt progressing well towards goals this date. Pt able to perform 3 sit<>stands with RW and ModA. Pt was able to take ~3-4 side steps R toward HOB with RW and Maureen. Pt with mild dizziness and BP stable this date. See note for details. Pt reports feeling unable to tolerate sitting up in chair this date. Recommending SNF placement.

## 2023-01-12 NOTE — PROGRESS NOTES
Progress Note  [facility]    Attending: Dr. Weaver  Admit Date: 1/8/2023  Today's Date: 01/12/2023    SUBJECTIVE:     HPI:  Ms. Hawley is a 67 y.o. F with PMHx of neurogenic bladder s/p suprapubic placement, chronic back pain on intrathecal Dilaudid pump, CHF, and hypothyroidism who presented with decreased urine output from catheter and hematuria. She states she recently had an oupatient urine culture positive for Staph. She reports allergies to vancomycin and Bactrim. UC from 01/06 resulted showing oxacillin and Bactrim sensitive staph aureus. Pt  was started on clindamycin for UTI. Continued tx for UTI by primary hospital team.    1/10/23: Patient transferred to the ICU for hypotension and sinus bradycardia (asymptomatic) with HR in the 30s. Received 2 boluses of NS (500 mL and 1L). At baseline, pt has low BP but not this low. Recently increased intrathecal Dilaudid by pain specialist around 3 weeks ago. Pt was started on Levophed.    Interval hx:  Patient seen and examined this morning. Tolerated CPAP overnight. Pt has been off Levophed and sustaining MAP >60. Denies CP or SOB. Left shoulder pain improved with heat pack yesterday. Currently being tx with cefazolin for UTI sensitive S aureus and S schleiferi, ongoing suprapubic pain. Pt would like to try heat pack for suprapubic pain.    Review of Systems   Constitutional:  Negative for chills and fever.   Respiratory:  Negative for hemoptysis, shortness of breath and wheezing.    Cardiovascular:  Positive for leg swelling. Negative for chest pain.   Gastrointestinal:  Positive for abdominal pain.   Genitourinary:  Negative for hematuria.   Musculoskeletal:  Positive for back pain.   Neurological:  Positive for weakness. Negative for focal weakness.   Psychiatric/Behavioral:  Negative for hallucinations. The patient is not nervous/anxious.        OBJECTIVE:     Vital Signs Trends/Hx Reviewed  Vitals:    01/12/23 0600 01/12/23 0615  01/12/23 0630 01/12/23 0645   BP: (!) 93/49 (!) 92/53 (!) 96/51 (!) 87/52   BP Location: Left arm      Patient Position: Lying      Pulse: (!) 48 (!) 49 (!) 47 (!) 42   Resp: 20 20 (!) 25 (!) 21   Temp:       TempSrc:       SpO2: 100% 100% 100% 99%   Weight:       Height:           Oxygen Concentration (%):  [40] 40    Physical Exam  Vitals reviewed.   Constitutional:       General: She is not in acute distress.     Appearance: She is ill-appearing. She is not toxic-appearing or diaphoretic.   HENT:      Head: Normocephalic and atraumatic.      Nose: Nose normal.      Mouth/Throat:      Mouth: Mucous membranes are moist.      Pharynx: Oropharynx is clear. No oropharyngeal exudate.   Eyes:      Extraocular Movements: Extraocular movements intact.      Conjunctiva/sclera: Conjunctivae normal.      Pupils: Pupils are equal, round, and reactive to light.   Cardiovascular:      Rate and Rhythm: Regular rhythm. Bradycardia present.      Pulses: Normal pulses.      Heart sounds: No murmur heard.    No gallop.   Pulmonary:      Effort: Pulmonary effort is normal. No respiratory distress.      Breath sounds: Normal breath sounds. No wheezing or rales.   Abdominal:      General: Abdomen is flat. Bowel sounds are normal. There is no distension.      Palpations: Abdomen is soft.      Tenderness: There is abdominal tenderness. There is no guarding.      Comments: SPC in place   Musculoskeletal:         General: Swelling present. Normal range of motion.      Cervical back: Normal range of motion and neck supple.   Skin:     General: Skin is warm.      Capillary Refill: Capillary refill takes less than 2 seconds.      Coloration: Skin is not jaundiced.      Findings: No bruising or lesion.   Neurological:      General: No focal deficit present.      Mental Status: She is alert.       Laboratory:  Recent Labs   Lab 01/12/23  0418   WBC 4.41   RBC 3.73*   HGB 9.3*   HCT 31.5*      MCV 85   MCH 24.9*   MCHC 29.5*     Recent  Labs   Lab 01/12/23  0418      K 4.9      CO2 33*   BUN 7*   CREATININE 0.9   MG 2.1     Recent Labs   Lab 01/11/23  0925   PH 7.397   PCO2 53.9*   PO2 57*   HCO3 33.2*   POCSATURATED 88*   BE 8         Chest Imaging:   No new chest imaging.     ASSESSMENT & RECOMMENDATIONS     Ms. Hawely is a 67 y.o. F with PMHx of neurogenic bladder s/p suprapubic placement, chronic pain on intrathecal Dilaudid pump, CHF, and hypothyroidism here in ICU due to requiring Levophed due to MAP <65. Now off Levophed.    Neuro/Psych  #Narcotic dependency, continuous  #Chronic pain   Chronic back pain s/p multiple surgeries, degenerative disc disease  Currently on home pump infusion, recently increased dose of intrathecal Dilaudid by pain specialist around 3 weeks ago  On multiple pain meds at home including Norco 10/325 mg q6h  Seen by palliative care team for symptom management  Tizanidine discontinued, pt does not take at home  Seroquel discontinued  Trazodone 300 mg at bedtime, pt has been taking at home  Continue to monitor  Outpatient pain management physician Dr. Hillman - primary team plans to discuss deescalating Dilaudid dose  Pt likely needs slow wean of Dilaudid pump    #Anxiety  Continue Fluoxetine     CV   #Bradycardia  Multiple etiologies considered including mediations, hypothyroidism, CECI, uncontrolled UTI sepsis  TSH elevated at 52, Free T4 0.75 (wnl); plan to increase levothyroxine. Unlikely contributing factor as T4 normal.  Medication related: Seroquel 100 mg and trazodone, pt reports she received 2 doses of trazodone in the hospital (normally one at home) and received 3x tizanidine here but does not normally take it at home  Most likely due to intrathecal Dilaudid pump being recently increased; as per daughter pt experiences bradycardia and hypotension after dose increases   CECI not on CPAP  Ongoing UTI infection, although less likely septic shock given normal lactate and afebrile  HR in the 30s,  sinus bradycardia, asymptomatic besides fatigue  Cardiology consulted  Now off Levo     #Hypotension  BP 81-93/42-55 over last 24 hours, s/p 1.5L total NS bolus  Lasix o/h   Breakthrough pain meds stopped  Likely etiology intrathecal Dilaudid pump v/s UTI sepsis (less likely)  Levophed --> phenylephrine as per eICU, does not help with HR --> back to Levo --> now off Levo    #Chronic diastolic heart failure  Lasix currently o/h in the setting of hypotension  Echo result from 1/10/23 - EF 50-55%, abnormal septal wall motion, mild LA enlargement, mild TR, mild FL, estimated PA systolic pressure 35 mmHg  1.8L fluid restriction   BNP was 17 five months ago     #Coronary artery disease of native artery with stable angina pectoris  History of CAD with LAD with R-L collaterals  Continue statin and ASA  Cardiology following     Pulm    Recent Labs     01/11/23  0925   PH 7.397   PCO2 53.9*   PO2 57*   HCO3 33.2*   POCSATURATED 88*   BE 8      #Chronic hypercapnic respiratory failure  #Obstructive sleep apnea  As per family, pt does not use CPAP at home  On CPAP overnight: CPAP 5 cmH20, FiO2 40%, MAP 5 cmH20, PIF 17 L/min, I:E ratio 1:2.7, 100% spont trig, leak 21 L/min, 5.6 mL/kg, PIP 5.1 cmH2O, Vte 407 mL, RR 11 BPM, MinVent 4.6 L/min  Never smoker  Will need outpatient NIF PFT, MIP/MEP, and sleep study  Chronic hypercapnic respiratory failure - DDx include CECI/OHS, chronic opioid use, COPD, neuromuscular disease  Pt will need outpatient autonomic studies to rule out neuromuscular disorder      FEN/GI  Diet: Cardiac diet  Electrolytes are wnl except bicarb 33 (31); likely due to hypercapnia  Keep Mg 2, K 4     RENAL  I/O last 3 completed shifts:  In: 3061.8 [P.O.:1891; I.V.:283.4; IV Piggyback:887.3]  Out: 3805 [Urine:3805]  No intake/output data recorded.    Intake/Output Summary (Last 24 hours) at 1/12/2023 0749  Last data filed at 1/12/2023 0644  Gross per 24 hour   Intake 1535.56 ml   Output 1870 ml   Net -334.44 ml       Strict I&Os  Avoid renal toxic meds  BUN/Cr: 7/0.9, at baseline  Continue to monitor renal status and urine output     #Suprapubic catheter  Chronic suprapubic pain and leakage  Recently exchanged, exchanged every 3 days  Urology consulted to replace on 1/9/23; due for change today  Continues to have clear yellow UOP, hematuria resolved    Heme  H/H 9.3/31.5; stable  WBC 4.4  DVT prophylaxis: Lovenox     #Iron deficiency anemia  Continue iron supplementation      Endo  Maintain glucose 140-180  SSI  HgbA1C: 5.9  TSH: 52     #Primary hypothyroidism  Continue home thyroid medication; increased from 137 --> 150 mcg on 1/11/23  TSH elevated at 52, Free T4 0.75 (wnl)     ID  Temp:  [96.4 °F (35.8 °C)-99 °F (37.2 °C)]    Urinalysis: notable pyuria, bacteria   Blood cultures NGTD x3  Urine culture ordered 1/10   Lactate 0.6 (wnl)     #UTI (urinary tract infection)  UA/UC from 01/06/23 showed staph aureus, sensitive to oxacillin and Bactrim  Repeat UA and UC from 01/08/23 - staph aureus  S/p clindamycin in ED  S/p Augmentin for a total 5-7 day course  Changed to Ancef on 01/09/23  Zosyn and Linezolid on 01/10/23; discontinued  Urine culture from 01/08/23 resulted 01/11/23 with Staphylococcus schleiferi  Currently on Ancef since 01/11/23; continue   ID consulted      DVT PPx: Lovenox, SCDs  Diet: Cardiac diet  GI PPX: Pantoprazole  Deconditioning: PT/OT  Code Status: Full        Dispo  Pt off Levophed, can step down to floor as long as she remains medically stable.      Thank you for allowing us to participate in the care of this patient. Please call with questions.      Paola Ballard MD, MPH  [facility] PGY-1  [facility]   01/12/2023

## 2023-01-12 NOTE — CARE UPDATE
HR overnight per Epic 43-54. HR 39 SB on telemetry this AM. Telemetry reviewed with notation of HR upper 30s to 50s overnight SB with no prolonged pauses or AVB. Bradycardia chronic issue and patient remains asymptomatic from HR standpoint. Management of other non cardiac problems per primary team. Avoid AV mir blockers. Cardiology to follow from afar for now. Please call with any questions or concerns

## 2023-01-12 NOTE — ASSESSMENT & PLAN NOTE
· BP /39-60 over last 24 hours  · S/p 1.5L total NS bolus and pressors  · Goal MAP >60 - maintaining off pressors  · BP reading appears to be highly differential depending on arm positioning  · Lasix o/h   · Given this and setting of CHF, do not want to overload w/ IVF  · May resume if BP can tolerate  · Patient moved to ICU 1/10 for Levophed gtt  · BP stable and weaned off 1/11  · Breakthrough pain meds stopped  · Likely etiology intrathecal dilaudid pump vs UTI sepsis (unlikely) vs hypothyroid vs polypharmacy  · Attempted to call Dr. Hillman (patient's pain medicine provider) regarding possibly decreasing dilaudid pump dose, no answer so voicemail left 1/11

## 2023-01-12 NOTE — ASSESSMENT & PLAN NOTE
"· Patient and daughter state she was never "officially diagnosed" w/ CECI and she doesn't wear CPAP  · ABG on RA: pO2 57; pCO2 53.9; pH 7.397;  HCO3 33.2, %O2 Sat 88  · Needs outpt PFTs, PSG, etc. Empiric BiPAP with sleep.    "

## 2023-01-12 NOTE — PLAN OF CARE
Problem: Occupational Therapy  Goal: Occupational Therapy Goal  Description: Goals to be met by: 02/09/2023      Patient will increase functional independence with ADLs by performing:    UE Dressing with Supervision.  LE Dressing with Minimal Assistance.  Grooming while standing with Stand-by Assistance.  Toileting from bedside commode or toilet with Contact Guard Assistance for hygiene and clothing management.   Toilet transfer to bedside commode or toilet with Contact Guard Assistance.  Increased functional strength to WFL for self care.  Upper extremity exercise program x10 reps per handout, with independence.     Outcome: Ongoing, Progressing   Pt progressing well towards OT goals. Pt able to tolerate sitting EOB and side steps to HOB with RW with minimal dizziness and minimal vital sign change.   BP taken as follows:   BP HI MAP   Supine 93/55 47 72   Seated 101/51 51 72   Standing 94/51 53 68   Return to Supine 111/54 49 78    Cont OT POC

## 2023-01-12 NOTE — ASSESSMENT & PLAN NOTE
· Lasix currently o/h in the setting of hypotension  · Dose given 1/10 - good UOP  · May resume if BP tolerates  · Echo pending  · Monitor  · 1.8L fluid restriction

## 2023-01-12 NOTE — ASSESSMENT & PLAN NOTE
· History of CAD with LAD with R-L collaterals  · Continue statin and ASA  · No hematuria present so will continue ASA  · Patient having off and on CP  · Trops negative and EKG shows no ischemic changes  · Cardiology following - see recs  · recommend medical management although limited given hematuria; ASA when cleared by Urology; recommend statin but no BB given baseline bradycardia; could use Imdur starting at 30 mg but will hold off given marginal BP and pressor use; currently chest pain free   · echo 1/10 with EF 50-55%; no need for further cardiac workup at this time; recommend cardiology follow up as an outpatient

## 2023-01-12 NOTE — TELEPHONE ENCOUNTER
----- Message from Shireen Mayo MD sent at 1/11/2023 11:31 AM CST -----  Regarding: RE: HFU  NP Is fine.   ----- Message -----  From: Christi Wang LPN  Sent: 1/10/2023   4:36 PM CST  To: Shireen Mayo MD, Angelica Mccauley  Subject: FW: HFU                                          Please advise. Pt discharged from [facility] with pending cultures. When would you like her to follow up? Should it be with you or NP okay for follow up?  ----- Message -----  From: Kristen Bray LPN  Sent: 1/10/2023   4:11 PM CST  To: Christi Wang LPN  Subject: FW: HFU                                            ----- Message -----  From: Angelica Mccauley  Sent: 1/10/2023   3:29 PM CST  To: [facility] Staff  Subject: HFU                                              Hello, patient is preparing for discharge from [facility] and is requiring a hospital follow up appointment with Dr. Mayo within 14 days. I'm unable to schedule an appointment within that time frame. If possible, can you please assist with scheduling this patient and message me back?      DX: UTI (urinary tract infection)      Additional Info: There are no available appointments that populated on Dr. Mayo's schedule.      Thank you,     Angelica Mccauley  Access Navigator/ [facility]

## 2023-01-12 NOTE — PT/OT/SLP PROGRESS
Occupational Therapy   Treatment    Name: Tasha Hawley  MRN: [id number]  Admitting Diagnosis:  UTI (urinary tract infection)       Recommendations:     Discharge Recommendations: nursing facility, skilled  Discharge Equipment Recommendations:   (TBD)  Barriers to discharge:   (Pt requires increased assistance, medical status)    Assessment:     Tasha Hawley is a 67 y.o. female with a medical diagnosis of UTI (urinary tract infection).  She presents with deconditioning but improving tolerance to EOB/OOB activity. Pt c/o some dizziness in stance but tolerated side steps to HOB with use of RW. Improved bed mobility for BLE management sit>sup this session. Performance deficits affecting function are weakness, impaired endurance, impaired sensation, impaired self care skills, impaired functional mobility, impaired cognition, visual deficits, impaired balance, gait instability, decreased upper extremity function, decreased lower extremity function, decreased safety awareness, pain, edema, impaired coordination, impaired fine motor, decreased ROM, impaired cardiopulmonary response to activity.     Rehab Prognosis:  Good; patient would benefit from acute skilled OT services to address these deficits and reach maximum level of function.       Plan:     Patient to be seen 3 x/week to address the above listed problems via self-care/home management, therapeutic activities, therapeutic exercises  Plan of Care Expires: 02/09/23  Plan of Care Reviewed with: patient    Subjective     Pain/Comfort:  Pain Rating 1:  (did not rate)  Location - Side 1: Bilateral  Location - Orientation 1: generalized  Location 1: leg (with touch to BLE)  Pain Addressed 1: Reposition, Distraction, Cessation of Activity, Nurse notified  Pain Rating Post-Intervention 1:  (did not rate, reported that she was comfortable at end of session)    Objective:     Communicated with: nsg prior to session.  Patient found HOB elevated with peripheral IV,  pulse ox (continuous), telemetry, blood pressure cuff, SCD, oxygen (suprapubic catheter) upon OT entry to room.    General Precautions: Standard, hearing impaired, fall    Orthopedic Precautions:N/A  Braces: N/A  Respiratory Status: Nasal cannula, flow 1.5 L/min     Occupational Performance:     Bed Mobility:    Patient completed Rolling/Turning to Right with contact guard assistance  Patient completed Scooting/Bridging with contact guard assistance  Patient completed Supine to Sit with contact guard assistance  Patient completed Sit to Supine with contact guard assistance and minimum assistance with 1-2 persons for guidance of trunk and BLE management    Functional Mobility/Transfers:  Sit to Stand: X 3 trials with ModA for 1st and 2nd trial and Min-ModA for 3rd trial with RW and VC's for hand placement  Functional Mobility: Pt with fair  dynamic seated and poor+ standing balance.     Activities of Daily Living:  Lower Body Dressing: total assistance to don/doff B socks and don B MALLORIE hose, ok'd per nsg for EOB/OOB trials and continued wear after session Pain noted with donning MALLORIE hose but none reported once placed      AMPAC 6 Click ADL: 15    Treatment & Education:  Pt educated on role of OT and POC.   Pt performing skills as listed above.   Pt able to tolerate sitting EOB and to take 3-4 side steps to HOB with RW with minimal dizziness and minimal vital sign change.    BP taken as follows:    BP FL MAP   Supine 93/55 47 72   Seated 101/51 51 72   Standing 94/51 53 68   Return to Supine 111/54 49 78     Pt reports feeling unable to tolerate sitting up in chair this date.    Patient left HOB elevated with all lines intact, call button in reach, nsg notified, and nsg present    GOALS:   Multidisciplinary Problems       Occupational Therapy Goals          Problem: Occupational Therapy    Goal Priority Disciplines Outcome Interventions   Occupational Therapy Goal     OT, PT/OT Ongoing, Progressing    Description:  Goals to be met by: 02/09/2023      Patient will increase functional independence with ADLs by performing:    UE Dressing with Supervision.  LE Dressing with Minimal Assistance.  Grooming while standing with Stand-by Assistance.  Toileting from bedside commode or toilet with Contact Guard Assistance for hygiene and clothing management.   Toilet transfer to bedside commode or toilet with Contact Guard Assistance.  Increased functional strength to WFL for self care.  Upper extremity exercise program x10 reps per handout, with independence.                          Time Tracking:     OT Date of Treatment: 01/12/23  OT Start Time: 1022  OT Stop Time: 1056  OT Total Time (min): 34 min    Billable Minutes:Self Care/Home Management 17  Therapeutic Activity 17    OT/SAMANTHA: OT     SAMANTHA Visit Number: 0    1/12/2023

## 2023-01-12 NOTE — PROGRESS NOTES
[facility] Medicine  Progress Note    Patient Name: Tasha Hawley  MRN: [id number]  Patient Class: IP- Inpatient   Admission Date: 1/8/2023  Length of Stay: 2 days  Attending Physician: Nic Mistry, *  Primary Care Provider: Mesfin Hodges Ii, MD        Subjective:     Principal Problem:UTI (urinary tract infection)        HPI:  Tasha Villalpando is a 67yr female with PMH Neurogenic bladder s/p suprapubic placement, CHF, hypothyroidism who presents with decreased urine output from catheter and hematuria.    Pt states that she has had pain from suprapubic catheter, with decreased urine output and hematuria. Catheter is changed every three days, per patient. She states she recently had an oupatient urine cultures positive for staph, but pending susceptibilities. She reports allergies to vancomycin and bactrium.    In the ED, initial triage vitals were significant for slightly low Bps. CBC showed microcytic anemia. BMP showed hyponatremia and hypokalemia. UA showed several WBC and RBC with moderate bacteria. UC from 01/06 resulted showing oxacillin and bactrium sensitive staph aureus. BC and UC from 01/08 are pending.    While in the ED, pt complained of lower abdominal pain and decreased output, with apparent clot noted on flushing. With manipulation, provider repositioned catheter, quickly obtaining 300cc of output shortly afterwards. Pt  was started on clindamycin for UTI.    Medicine accepted pt for UTI.      Overview/Hospital Course:  1/10: Patient has been running a consistently low BP and HR over the last 24 hours. She is fatigued but otherwise asymptomatic. She received a 1x bolus of 500 ml NS and a 1x bolus of 1000 ml NS. Per patient and her , BP is regularly low but not quite this low. However, she did just have her intrathecal dilaudid pump increased so that may be the culprit. Lasix is currently o/h and in the setting of CHF do not want to give anymore IVF at this  time. Also do not want to give midodrine at this time given bradycardia. Cardiology consulted regarding bradycardia as low as 40 bpm. Appreciate their recommendations.    1/11: Patient moved to 1/10 to start pressors and for increased monitoring d/t increasingly decreased HR and bradycardia partnered with increased lethargy (falling asleep while eating and talking). Patient seen during ICU rounding and BP significantly improved while weaning off Levophed gtt. HR still low but patient awake, alert, and participating in conversation appropriately. Attempted to contact patient's outpatient provider who manages her intrathecal dilaudid pump about possibly decreasing dose, as this is thought to possibly be a cause of patient's hypotension and bradycardia. Left voicemail. Will continue weaning off levophed gtt.    1/12: Patient's BP low but stable off Levophed gtt w/ MAP maintaining >60 per ICU recs. HR low but stable. Patient asymptomatic and without acute distress noted. Patient was c/o suprapubic catheter leakage and increased pelvic pain yesterday so urology re-consulted. Per Dr. Villalba, who is familiar w/ this patient, this is a chronic problem. Will likely step down patient out of ICU today.       Interval History:No acute distress noted. Patient sitting up in bed eating breakfast. States continued pain but she is in good spirits and is well-appearing. Likely step-down out of ICU today.    Review of Systems   Constitutional:  Negative for fatigue and fever.   HENT:  Negative for trouble swallowing.    Respiratory:  Negative for shortness of breath.    Cardiovascular:  Positive for leg swelling. Negative for chest pain and palpitations.   Gastrointestinal:  Negative for abdominal pain.   Genitourinary:  Positive for pelvic pain.        Suprapubic catheter   Musculoskeletal:  Positive for back pain, gait problem and myalgias.   Neurological:  Positive for weakness. Negative for dizziness, light-headedness, numbness and  headaches.   Psychiatric/Behavioral:  Negative for agitation and confusion. The patient is not nervous/anxious.    Objective:     Vital Signs (Most Recent):  Temp: 98.2 °F (36.8 °C) (23)  Pulse: (!) 43 (23)  Resp: 15 (23)  BP: (!) 88/48 (23)  SpO2: 98 % (23 0815)   Vital Signs (24h Range):  Temp:  [97.3 °F (36.3 °C)-98.2 °F (36.8 °C)] 98.2 °F (36.8 °C)  Pulse:  [42-57] 43  Resp:  [10-35] 15  SpO2:  [90 %-100 %] 98 %  BP: ()/(39-60) 88/48     Weight: 85.7 kg (189 lb)  Body mass index is 29.6 kg/m².    Intake/Output Summary (Last 24 hours) at 2023 0933  Last data filed at 2023 0644  Gross per 24 hour   Intake 1535.56 ml   Output 1870 ml   Net -334.44 ml        Physical Exam  Vitals and nursing note reviewed.   Constitutional:       General: She is not in acute distress.     Appearance: She is obese.   HENT:      Head: Normocephalic and atraumatic.   Eyes:      Pupils: Pupils are equal, round, and reactive to light.   Cardiovascular:      Rate and Rhythm: Regular rhythm. Bradycardia present.      Pulses:           Dorsalis pedis pulses are 1+ on the right side and 1+ on the left side.      Heart sounds: Normal heart sounds.   Pulmonary:      Effort: Pulmonary effort is normal. No respiratory distress.      Breath sounds: Decreased breath sounds present.   Abdominal:      General: Bowel sounds are normal. There is no distension.      Palpations: Abdomen is soft.      Tenderness: There is no abdominal tenderness.   Genitourinary:     Comments: Suprapubic catheter  Musculoskeletal:      Right lower le+ Edema present.      Left lower le+ Edema present.   Skin:     General: Skin is warm.   Neurological:      Mental Status: She is alert and oriented to person, place, and time.      Motor: Weakness present.   Psychiatric:         Mood and Affect: Mood normal.         Behavior: Behavior normal.         Thought Content: Thought content normal.          Judgment: Judgment normal.       Significant Labs: All pertinent labs within the past 24 hours have been reviewed.    Significant Imaging: I have reviewed all pertinent imaging results/findings within the past 24 hours.      Assessment/Plan:      * UTI (urinary tract infection)  · ->UA/UC from 01/06 showed staph aureus, sensitive to oxacillin and bacterium  · ->repeat UA and UC from 01/08 - staph aureus  · ->s/p clindamycin in ED.   · ->s/p augmentin for a total 5-7 day course  · Changed to ancef 1/9  · UC 1/8 - staph schleiferi  · Patient had been changed to broad spectrum abx in the setting of possible sepsis 1/10  · Unlikely cause of bradycardia and hypotension  · Lactate NL, afebrile, WBC NL  · Changed back to ancef 1/10  · ID consult placed for recurrent UTIs - continue ancef and f/u w/ urology      Anxiety  · C/w fluoxetine dose      Hypotension  · BP /39-60 over last 24 hours  · S/p 1.5L total NS bolus and pressors  · Goal MAP >60 - maintaining off pressors  · BP reading appears to be highly differential depending on arm positioning  · Lasix o/h   · Given this and setting of CHF, do not want to overload w/ IVF  · May resume if BP can tolerate  · Patient moved to ICU 1/10 for Levophed gtt  · BP stable and weaned off 1/11  · Breakthrough pain meds stopped  · Likely etiology intrathecal dilaudid pump vs UTI sepsis (unlikely) vs hypothyroid vs polypharmacy  · Attempted to call Dr. Hillman (patient's pain medicine provider) regarding possibly decreasing dilaudid pump dose, no answer so voicemail left 1/11      Constipation due to opioid therapy  · C/w aggressive bowel regimen       Chronic diastolic heart failure  · Lasix currently o/h in the setting of hypotension  · Dose given 1/10 - good UOP  · May resume if BP tolerates  · Echo pending  · Monitor  · 1.8L fluid restriction      Suprapubic catheter  · Recently exchanged. Exchanged every 3 days  · ED provider repositioned it this admission  · Continue to  "monitor urine output  · Urology consulted to replace 1/9 - now clear yellow UOP  · 1/11 c/o increased pelvic pain and leakage around SPC  · Urology re-consulted but patient known to Dr. Villalba - states this is a chronic problem and patient can f/u outpatient        Bradycardia  · HR as low as 39 bpm 1/10 - patient asymptomatic except for fatigue but became more lethargic as the day progressed   · Patient moved to ICU for pressors and HR dropped as low as 20s  · Patient back up into 30s-40s the morning of 1/11 and patient asymptomatic  · Patient maintaining in 40s  · Cardiology following - see recs  · Echo performed- see report  · Maintain telemetry      Primary hypothyroidism  · TSH 51.871  · T4 0.75  · Synthroid increased to 150 mg/d      Coronary artery disease of native artery with stable angina pectoris  · History of CAD with LAD with R-L collaterals  · Continue statin and ASA  · No hematuria present so will continue ASA  · Patient having off and on CP  · Trops negative and EKG shows no ischemic changes  · Cardiology following - see recs  · recommend medical management although limited given hematuria; ASA when cleared by Urology; recommend statin but no BB given baseline bradycardia; could use Imdur starting at 30 mg but will hold off given marginal BP and pressor use; currently chest pain free   · echo 1/10 with EF 50-55%; no need for further cardiac workup at this time; recommend cardiology follow up as an outpatient         Narcotic dependency, continuous  · Currently on home pump infusion          Iron deficiency anemia  · H/H stable  · May continue iron supplementation  May consider iron/ferritin labs      Sleep apnea  · Patient and daughter state she was never "officially diagnosed" w/ CECI and she doesn't wear CPAP  · ABG on RA: pO2 57; pCO2 53.9; pH 7.397;  HCO3 33.2, %O2 Sat 88  · Needs outpt PFTs, PSG, etc. Empiric BiPAP with sleep.        VTE Risk Mitigation (From admission, onward)         Ordered     " Place MALLORIE hose  Until discontinued         01/10/23 1309     enoxaparin injection 40 mg  Daily         01/09/23 0107     IP VTE HIGH RISK PATIENT  Once         01/09/23 0107     Place sequential compression device  Until discontinued         01/09/23 0107                Discharge Planning   JACKIE:      Code Status: Full Code   Is the patient medically ready for discharge?:     Reason for patient still in hospital (select all that apply): Patient trending condition, Treatment and Consult recommendations  Discharge Plan A: Home Health (the pt's current with [facility])   Discharge Delays: None known at this time        Critical care time spent on the evaluation and treatment of severe organ dysfunction, review of pertinent labs and imaging studies, discussions with consulting providers and discussions with patient/family: 35 minutes.      Lisa Carrasco NP  Department of Hospital Medicine   [facility] - Intensive Care

## 2023-01-12 NOTE — ASSESSMENT & PLAN NOTE
· Recently exchanged. Exchanged every 3 days  · ED provider repositioned it this admission  · Continue to monitor urine output  · Urology consulted to replace 1/9 - now clear yellow UOP  · 1/11 c/o increased pelvic pain and leakage around SPC  · Urology re-consulted but patient known to Dr. Villalba - states this is a chronic problem and patient can f/u outpatient

## 2023-01-12 NOTE — ASSESSMENT & PLAN NOTE
· ->UA/UC from 01/06 showed staph aureus, sensitive to oxacillin and bacterium  · ->repeat UA and UC from 01/08 - staph aureus  · ->s/p clindamycin in ED.   · ->s/p augmentin for a total 5-7 day course  · Changed to ancef 1/9  · UC 1/8 - staph schleiferi  · Patient had been changed to broad spectrum abx in the setting of possible sepsis 1/10  · Unlikely cause of bradycardia and hypotension  · Lactate NL, afebrile, WBC NL  · Changed back to ancef 1/10  · ID consult placed for recurrent UTIs - continue ancef and f/u w/ urology

## 2023-01-12 NOTE — TELEPHONE ENCOUNTER
Pt scheduled for 2 week hospital follow up. She is still in-patient in ICU as of today (1/12/2023).

## 2023-01-12 NOTE — TELEPHONE ENCOUNTER
----- Message from Rebecca Hays LPN sent at 1/11/2023  5:15 PM CST -----  Regarding: FW: Pt Advice  Contact: [phone]    ----- Message -----  From: Cora Garcia  Sent: 1/11/2023   4:51 PM CST  To: [facility] Staff  Subject: Pt Advice                                        Pt is calling stating she is in ICU with a bacterial infection and is requesting a call back immediately.  Please call. States she's in a lot of pain.

## 2023-01-12 NOTE — PT/OT/SLP PROGRESS
Physical Therapy Treatment    Patient Name:  Tasha Hawley   MRN:  [id number]    Recommendations:     Discharge Recommendations: nursing facility, skilled  Discharge Equipment Recommendations:  (defer to next level of care)  Barriers to discharge:  increased assist needed, fall risk    Assessment:     Tasha Hawley is a 67 y.o. female admitted with a medical diagnosis of UTI (urinary tract infection).  She presents with the following impairments/functional limitations: gait instability, weakness, impaired balance, impaired endurance, impaired self care skills, impaired functional mobility, decreased safety awareness, decreased lower extremity function, decreased upper extremity function, impaired cardiopulmonary response to activity, impaired cognition, decreased ROM, edema, pain, impaired sensation .Pt progressing well towards goals this date. Pt able to perform 3 sit<>stands with RW and ModA. Pt was able to take ~3-4 side steps R toward HOB with RW and Maureen. Pt with mild dizziness and BP stable this date. See note for details. Pt reports feeling unable to tolerate sitting up in chair this date. Recommending SNF placement.     Rehab Prognosis: Good and Fair; patient would benefit from acute skilled PT services to address these deficits and reach maximum level of function.    Recent Surgery: * No surgery found *      Plan:     During this hospitalization, patient to be seen 5 x/week to address the identified rehab impairments via gait training, therapeutic activities, therapeutic exercises, neuromuscular re-education and progress toward the following goals:    Plan of Care Expires:  02/09/23    Subjective     Chief Complaint: B LE, back, and lower abdominal pain  Patient/Family Comments/goals: pt motivated to participate in therapy  Pain/Comfort:  Pain Rating 1:  (8.5/10)  Location - Side 1: Bilateral  Location - Orientation 1: generalized  Location 1: leg (back and lower abdomen)  Pain Addressed 1:  Reposition, Distraction, Cessation of Activity, Nurse notified  Pain Rating Post-Intervention 1:  (did not report/rate)      Objective:     Communicated with nsg prior to session.  Patient found HOB elevated with pulse ox (continuous), peripheral IV, telemetry, blood pressure cuff, SCD, oxygen (suprapubic catheter) upon PT entry to room.     General Precautions: Standard, hearing impaired, fall  Orthopedic Precautions: N/A  Braces: N/A  Respiratory Status: Nasal cannula, flow 1.5 L/min     Functional Mobility:  Bed Mobility:     Rolling Right: contact guard assistance  Scooting: contact guard assistance  Supine to Sit: contact guard assistance  Sit to Supine: contact guard assistance, minimum assistance, and of 1-2 persons  Transfers:     Sit to Stand: X 3 trials with ModA for 1st and 2nd trial and Min-ModA for 3rd trial with RW and VC's for hand placement  Gait: Pt was able to take ~3-4 side steps R toward HOB with RW and Maureen.      AM-PAC 6 CLICK MOBILITY  Turning over in bed (including adjusting bedclothes, sheets and blankets)?: 3  Sitting down on and standing up from a chair with arms (e.g., wheelchair, bedside commode, etc.): 2  Moving from lying on back to sitting on the side of the bed?: 3  Moving to and from a bed to a chair (including a wheelchair)?: 2  Need to walk in hospital room?: 2  Climbing 3-5 steps with a railing?: 1  Basic Mobility Total Score: 13       Treatment & Education:  Donned MALLORIE hose for BLE compression  Therex: 8 reps ankle pumps and 10 reps LAQs BLE  Pt able to perform 3 sit<>stands with RW and ModA.  Seated rest breaks taken in between   Pt was able to take ~3-4 side steps R toward HOB with RW and Maureen.   Pt with mild dizziness and BP stable this date.   Pt reports feeling unable to tolerate sitting up in chair this date.  Pt returned supine.  BP taken as follows:    BP [place] MAP   Supine 93/55 47 72   Seated 101/51 51 72   Standing 94/51 53 68   Return to Supine 111/54 49 78        Patient left HOB elevated with all lines intact, call button in reach, and nsg notified..    GOALS:   Multidisciplinary Problems       Physical Therapy Goals          Problem: Physical Therapy    Goal Priority Disciplines Outcome Goal Variances Interventions   Physical Therapy Goal     PT, PT/OT Ongoing, Progressing     Description: Goals to be met by: 23     Patient will increase functional independence with mobility by performin. Supine to sit with Stand-by Assistance  2. Sit to supine with Stand-by Assistance  3. Sit to stand transfer with Stand-by Assistance  4. Bed to chair transfer with Stand-by Assistance using Rolling Walker  5. Gait  x 50 feet with Stand-by Assistance using Rolling Walker.                          Time Tracking:     PT Received On: 23  PT Start Time: 1023     PT Stop Time: 1056  PT Total Time (min): 33 min     Billable Minutes: Therapeutic Activity 18 and Therapeutic Exercise 15 (cotx with OT)    Treatment Type: Treatment  PT/PTA: PT     PTA Visit Number: 0     2023

## 2023-01-12 NOTE — CARE UPDATE
Attempted to call patient's pain clinic again regarding dilaudid pump. Once again went straight to voicemail. Voicemail left again with number for them to call me back.

## 2023-01-12 NOTE — ASSESSMENT & PLAN NOTE
· HR as low as 39 bpm 1/10 - patient asymptomatic except for fatigue but became more lethargic as the day progressed   · Patient moved to ICU for pressors and HR dropped as low as 20s  · Patient back up into 30s-40s the morning of 1/11 and patient asymptomatic  · Patient maintaining in 40s  · Cardiology following - see recs  · Echo performed- see report  · Maintain telemetry

## 2023-01-13 LAB
ANION GAP SERPL CALC-SCNC: 6 MMOL/L (ref 8–16)
BACTERIA UR CULT: ABNORMAL
BASOPHILS # BLD AUTO: 0.01 K/UL (ref 0–0.2)
BASOPHILS NFR BLD: 0.2 % (ref 0–1.9)
BUN SERPL-MCNC: 7 MG/DL (ref 8–23)
CALCIUM SERPL-MCNC: 8.8 MG/DL (ref 8.7–10.5)
CHLORIDE SERPL-SCNC: 101 MMOL/L (ref 95–110)
CO2 SERPL-SCNC: 33 MMOL/L (ref 23–29)
CREAT SERPL-MCNC: 0.8 MG/DL (ref 0.5–1.4)
DIFFERENTIAL METHOD: ABNORMAL
EOSINOPHIL # BLD AUTO: 0.3 K/UL (ref 0–0.5)
EOSINOPHIL NFR BLD: 6.8 % (ref 0–8)
ERYTHROCYTE [DISTWIDTH] IN BLOOD BY AUTOMATED COUNT: 15.4 % (ref 11.5–14.5)
EST. GFR  (NO RACE VARIABLE): >60 ML/MIN/1.73 M^2
GLUCOSE SERPL-MCNC: 90 MG/DL (ref 70–110)
HCT VFR BLD AUTO: 31.3 % (ref 37–48.5)
HGB BLD-MCNC: 9.2 G/DL (ref 12–16)
IMM GRANULOCYTES # BLD AUTO: 0.02 K/UL (ref 0–0.04)
IMM GRANULOCYTES NFR BLD AUTO: 0.5 % (ref 0–0.5)
LYMPHOCYTES # BLD AUTO: 1.8 K/UL (ref 1–4.8)
LYMPHOCYTES NFR BLD: 40.2 % (ref 18–48)
MAGNESIUM SERPL-MCNC: 2 MG/DL (ref 1.6–2.6)
MCH RBC QN AUTO: 24.7 PG (ref 27–31)
MCHC RBC AUTO-ENTMCNC: 29.4 G/DL (ref 32–36)
MCV RBC AUTO: 84 FL (ref 82–98)
MONOCYTES # BLD AUTO: 0.4 K/UL (ref 0.3–1)
MONOCYTES NFR BLD: 10 % (ref 4–15)
NEUTROPHILS # BLD AUTO: 1.9 K/UL (ref 1.8–7.7)
NEUTROPHILS NFR BLD: 42.3 % (ref 38–73)
NRBC BLD-RTO: 0 /100 WBC
PLATELET # BLD AUTO: 225 K/UL (ref 150–450)
PMV BLD AUTO: 10 FL (ref 9.2–12.9)
POTASSIUM SERPL-SCNC: 4.8 MMOL/L (ref 3.5–5.1)
RBC # BLD AUTO: 3.72 M/UL (ref 4–5.4)
SODIUM SERPL-SCNC: 140 MMOL/L (ref 136–145)
WBC # BLD AUTO: 4.38 K/UL (ref 3.9–12.7)

## 2023-01-13 PROCEDURE — 25000003 PHARM REV CODE 250: Mod: HCNC | Performed by: STUDENT IN AN ORGANIZED HEALTH CARE EDUCATION/TRAINING PROGRAM

## 2023-01-13 PROCEDURE — 63600175 PHARM REV CODE 636 W HCPCS: Mod: HCNC | Performed by: STUDENT IN AN ORGANIZED HEALTH CARE EDUCATION/TRAINING PROGRAM

## 2023-01-13 PROCEDURE — 83735 ASSAY OF MAGNESIUM: CPT | Mod: HCNC | Performed by: STUDENT IN AN ORGANIZED HEALTH CARE EDUCATION/TRAINING PROGRAM

## 2023-01-13 PROCEDURE — 85025 COMPLETE CBC W/AUTO DIFF WBC: CPT | Mod: HCNC | Performed by: NURSE PRACTITIONER

## 2023-01-13 PROCEDURE — 63600175 PHARM REV CODE 636 W HCPCS: Mod: HCNC | Performed by: NURSE PRACTITIONER

## 2023-01-13 PROCEDURE — 80048 BASIC METABOLIC PNL TOTAL CA: CPT | Mod: HCNC | Performed by: NURSE PRACTITIONER

## 2023-01-13 PROCEDURE — 25000003 PHARM REV CODE 250: Mod: HCNC | Performed by: NURSE PRACTITIONER

## 2023-01-13 PROCEDURE — 36415 COLL VENOUS BLD VENIPUNCTURE: CPT | Mod: HCNC | Performed by: STUDENT IN AN ORGANIZED HEALTH CARE EDUCATION/TRAINING PROGRAM

## 2023-01-13 PROCEDURE — 11000001 HC ACUTE MED/SURG PRIVATE ROOM: Mod: HCNC

## 2023-01-13 PROCEDURE — 99900035 HC TECH TIME PER 15 MIN (STAT): Mod: HCNC

## 2023-01-13 PROCEDURE — 94761 N-INVAS EAR/PLS OXIMETRY MLT: CPT | Mod: HCNC

## 2023-01-13 PROCEDURE — 25000003 PHARM REV CODE 250: Mod: HCNC | Performed by: FAMILY MEDICINE

## 2023-01-13 PROCEDURE — 94660 CPAP INITIATION&MGMT: CPT | Mod: HCNC

## 2023-01-13 PROCEDURE — 94760 N-INVAS EAR/PLS OXIMETRY 1: CPT | Mod: HCNC

## 2023-01-13 RX ORDER — LANOLIN ALCOHOL/MO/W.PET/CERES
1 CREAM (GRAM) TOPICAL DAILY
Status: DISCONTINUED | OUTPATIENT
Start: 2023-01-13 | End: 2023-01-19 | Stop reason: HOSPADM

## 2023-01-13 RX ADMIN — ASPIRIN 81 MG: 81 TABLET, COATED ORAL at 08:01

## 2023-01-13 RX ADMIN — FERROUS SULFATE TAB 325 MG (65 MG ELEMENTAL FE) 1 EACH: 325 (65 FE) TAB at 08:01

## 2023-01-13 RX ADMIN — QUETIAPINE FUMARATE 25 MG: 25 TABLET ORAL at 08:01

## 2023-01-13 RX ADMIN — ATORVASTATIN CALCIUM 80 MG: 40 TABLET, FILM COATED ORAL at 08:01

## 2023-01-13 RX ADMIN — CEFAZOLIN SODIUM 2 G: 2 SOLUTION INTRAVENOUS at 05:01

## 2023-01-13 RX ADMIN — ENOXAPARIN SODIUM 40 MG: 40 INJECTION SUBCUTANEOUS at 04:01

## 2023-01-13 RX ADMIN — MUPIROCIN: 20 OINTMENT TOPICAL at 08:01

## 2023-01-13 RX ADMIN — POLYETHYLENE GLYCOL 3350 17 G: 17 POWDER, FOR SOLUTION ORAL at 08:01

## 2023-01-13 RX ADMIN — PANTOPRAZOLE SODIUM 40 MG: 40 TABLET, DELAYED RELEASE ORAL at 08:01

## 2023-01-13 RX ADMIN — DOCUSATE SODIUM - SENNOSIDES 1 TABLET: 50; 8.6 TABLET, FILM COATED ORAL at 08:01

## 2023-01-13 RX ADMIN — CEFAZOLIN SODIUM 2 G: 2 SOLUTION INTRAVENOUS at 10:01

## 2023-01-13 RX ADMIN — FLUOXETINE 60 MG: 20 CAPSULE ORAL at 08:01

## 2023-01-13 RX ADMIN — CEFAZOLIN SODIUM 2 G: 2 SOLUTION INTRAVENOUS at 02:01

## 2023-01-13 RX ADMIN — IRON SUCROSE 200 MG: 20 INJECTION, SOLUTION INTRAVENOUS at 04:01

## 2023-01-13 RX ADMIN — TRAZODONE HYDROCHLORIDE 300 MG: 100 TABLET ORAL at 08:01

## 2023-01-13 NOTE — CARE UPDATE
Staff member at patient's pain medicine clinic called me back today and they stated that they were going to send a nurse out to the hospital to see the patient on 1/11. However, they stated that patient called the clinic on 1/11 and told them that she did not need them to come out and adjust her pump. Therefore no one was sent out and there was no follow-up until today. Patient is now off of pressors and BP and HR are more stabilized. Nurse who manages patient's pump is supposed to call me back, however, at this time it may be best to have her f/u outpatient.

## 2023-01-13 NOTE — ASSESSMENT & PLAN NOTE
· Lasix currently o/h in the setting of hypotension  · Dose given 1/10 - good UOP  · May resume if BP tolerates  · Echo performed - see report  · Monitor  · 1.8L fluid restriction

## 2023-01-13 NOTE — SUBJECTIVE & OBJECTIVE
"Interval History: Patient's BP remaining stable. Step-down orders placed yesterday but awaiting a bed. Patient states SPC "gushing" when she sat up to eat breakfast. I assessed it and noted some urine leakage but not a significant amount. Catheter balloon deflated and reinflated, patient tolerated well. Clear UOP in [facility] catheter bag.    Review of Systems   Constitutional:  Negative for fatigue and fever.   HENT:  Negative for trouble swallowing.    Respiratory:  Negative for shortness of breath.    Cardiovascular:  Positive for leg swelling. Negative for chest pain and palpitations.   Gastrointestinal:  Negative for abdominal pain.   Genitourinary:  Positive for pelvic pain.        Suprapubic catheter   Musculoskeletal:  Positive for back pain, gait problem and myalgias.   Neurological:  Positive for weakness. Negative for dizziness, light-headedness, numbness and headaches.   Psychiatric/Behavioral:  Negative for agitation and confusion. The patient is not nervous/anxious.    Objective:     Vital Signs (Most Recent):  Temp: 98.9 °F (37.2 °C) (01/13/23 0815)  Pulse: (!) 46 (01/13/23 0900)  Resp: (!) 26 (01/13/23 0900)  BP: (!) 102/57 (01/13/23 0900)  SpO2: (!) 94 % (01/13/23 0900)   Vital Signs (24h Range):  Temp:  [98.1 °F (36.7 °C)-98.9 °F (37.2 °C)] 98.9 °F (37.2 °C)  Pulse:  [38-55] 46  Resp:  [9-32] 26  SpO2:  [71 %-100 %] 94 %  BP: ()/(40-59) 102/57     Weight: 85.7 kg (189 lb)  Body mass index is 29.6 kg/m².    Intake/Output Summary (Last 24 hours) at 1/13/2023 1144  Last data filed at 1/13/2023 1001  Gross per 24 hour   Intake 1777.16 ml   Output 2650 ml   Net -872.84 ml        Physical Exam  Vitals and nursing note reviewed.   Constitutional:       General: She is not in acute distress.     Appearance: She is obese.   HENT:      Head: Normocephalic and atraumatic.   Eyes:      Pupils: Pupils are equal, round, and reactive to light.   Cardiovascular:      Rate and Rhythm: Regular rhythm. Bradycardia " present.      Pulses:           Dorsalis pedis pulses are 1+ on the right side and 1+ on the left side.      Heart sounds: Normal heart sounds.   Pulmonary:      Effort: Pulmonary effort is normal. No respiratory distress.      Breath sounds: Decreased breath sounds present.   Abdominal:      General: Bowel sounds are normal. There is no distension.      Palpations: Abdomen is soft.      Tenderness: There is no abdominal tenderness.   Genitourinary:     Comments: Suprapubic catheter  Musculoskeletal:      Right lower le+ Edema present.      Left lower le+ Edema present.   Skin:     General: Skin is warm.   Neurological:      Mental Status: She is alert and oriented to person, place, and time.      Motor: Weakness present.   Psychiatric:         Mood and Affect: Mood normal.         Behavior: Behavior normal.         Thought Content: Thought content normal.         Judgment: Judgment normal.       Significant Labs: All pertinent labs within the past 24 hours have been reviewed.    Significant Imaging: I have reviewed all pertinent imaging results/findings within the past 24 hours.

## 2023-01-13 NOTE — NURSING
Patient transferred to room from ICU.  Patient is awake and alert.  Denies pain.  Resting quietly in bed. SCDs applied to lower bilateral extremities.  Iron venofer started.  Suprapubic catheter is intact and draining clear yellow urine.  Safety is maintained with bed low, wheels locked and side rails up.  Call light within reach.  Bed alarm on.  Will continue to monitor. Pain pump intact right abdominal area.

## 2023-01-13 NOTE — ASSESSMENT & PLAN NOTE
· ->UA/UC from 01/06 showed staph aureus, sensitive to oxacillin and bacterium  · ->repeat UA and UC from 01/08 - staph aureus  · ->s/p clindamycin in ED.   · ->s/p augmentin for a total 5-7 day course  · Changed to ancef 1/9  · UC 1/8 - staph schleiferi  · Patient had been changed to broad spectrum abx in the setting of possible sepsis 1/10  · Unlikely cause of bradycardia and hypotension  · Lactate NL, afebrile, WBC NL  · Changed back to ancef 1/10  · ID consult placed for recurrent UTIs - continue ancef and f/u w/ urology  · Transition to keflex 500 mg QID at discharge for total 14 day course (end date: 1/25/2023)

## 2023-01-13 NOTE — ASSESSMENT & PLAN NOTE
"· Patient and daughter state she was never "officially diagnosed" w/ CECI and she doesn't wear CPAP  · ABG on RA: pO2 57; pCO2 53.9; pH 7.397;  HCO3 33.2, %O2 Sat 88  · Needs outpt PFTs, PSG, etc. Continue w/ CPAP w/ sleep.    "

## 2023-01-13 NOTE — PT/OT/SLP PROGRESS
Physical Therapy      Patient Name:  Tasha Hawley   MRN:  [id number]    Nsg with pt at this time.  Will follow up later if time permits.  .

## 2023-01-13 NOTE — PLAN OF CARE
Care Plan    POC reviewed with Tasha Hawley. Questions and concerns addressed. No acute events today. Pt progressing toward goals. Will continue to monitor. See below and flowsheets for full assessment and VS info.       Neuro:  Jessica Coma Scale  Best Eye Response: 4-->(E4) spontaneous  Best Motor Response: 6-->(M6) obeys commands  Best Verbal Response: 5-->(V5) oriented  [place] Coma Scale Score: 15  Pupil PERRLA: yes  24 hr Temp:  [98.1 °F (36.7 °C)-98.4 °F (36.9 °C)]      CV:  Rhythm: sinus bradycardia  DVT prophylaxis: VTE Required Core Measure: (TEDs) Compression stocking therapy initiated/maintained, (SCDs) Sequential compression device initiated/maintained    Resp:     Oxygen Concentration (%): 40    GI/:  GI prophylaxis: no  Diet/Nutrition Received: 2 gram sodium, low saturated fat/low cholesterol, restrict fluids  Last Bowel Movement: 01/08/23  Voiding Characteristics: suprapubic catheter   Intake/Output Summary (Last 24 hours) at 1/13/2023 0603  Last data filed at 1/13/2023 0557  Gross per 24 hour   Intake 1793.91 ml   Output 2250 ml   Net -456.09 ml       Labs/Accuchecks:  Recent Labs   Lab 01/13/23  0352   WBC 4.38   RBC 3.72*   HGB 9.2*   HCT 31.3*         Recent Labs   Lab 01/08/23  2347 01/09/23  0443 01/13/23  0352      < > 140   K 3.2*   < > 4.8   CO2 31*   < > 33*   CL 95   < > 101   BUN 12   < > 7*   CREATININE 1.1   < > 0.8   ALKPHOS 105  --   --    ALT 5*  --   --    AST 10  --   --    BILITOT 0.6  --   --     < > = values in this interval not displayed.    No results for input(s): PROTIME, INR, APTT, HEPANTIXA in the last 168 hours.   Recent Labs   Lab 01/10/23  1258   TROPONINI 0.013       Electrolytes: N/A - electrolytes WDL  Accuchecks: none    Gtts/LDAs:   NORepinephrine bitartrate-D5W Stopped (01/12/23 0221)       Lines/Drains/Airways       Drain  Duration                  Suprapubic Catheter 12/13/22 100% silicone 20 Fr. 31 days              Line  Duration                   Subcutaneous Infusion Pump Abdomen (Comment) -- days              Peripheral Intravenous Line  Duration                  Peripheral IV - Single Lumen 01/09/23 0020 Right Antecubital 4 days         Peripheral IV - Single Lumen 01/10/23 1710 20 G Anterior;Right Upper Arm 2 days                    Skin/Wounds  Bathing/Skin Care: other (see comments) (hands and face wiped) (01/10/23 1625)  Wounds: No  Wound care consulted: No    Consults  Consults (From admission, onward)          Status Ordering Provider     Inpatient consult to Infectious Diseases  Once        Provider:  Alberto Rodriguez MD    Completed CHIDI HENRIQUEZ     [facility]patient [facility]  Once        Provider:  Nick Lozano MD    Completed CHIDI HENRIQUEZ     Inpatient consult to Palliative Care  Once        Provider:  Robert Stein Jr., MD    Completed CHIDI HENRIQUEZ     Inpatient consult to Urology  Once        Provider:  (Not yet assigned)    Completed CHIDI HENRIQUEZ     IP consult to case management  Once        Provider:  (Not yet assigned)    Completed CHRISTINE SWANSON            Problem: Adult Inpatient Plan of Care  Goal: Plan of Care Review  Outcome: Ongoing, Progressing  Goal: Patient-Specific Goal (Individualized)  Outcome: Ongoing, Progressing  Goal: Absence of Hospital-Acquired Illness or Injury  Outcome: Ongoing, Progressing  Goal: Optimal Comfort and Wellbeing  Outcome: Ongoing, Progressing     Problem: Fall Injury Risk  Goal: Absence of Fall and Fall-Related Injury  Outcome: Ongoing, Progressing

## 2023-01-13 NOTE — ASSESSMENT & PLAN NOTE
· Low but stable, patient asymptomatic    · HR as low as 39 bpm 1/10 - patient asymptomatic except for fatigue but became more lethargic as the day progressed   · Patient moved to ICU for pressors and HR dropped as low as 20s  · Patient back up into 30s-40s the morning of 1/11 and patient asymptomatic  · Patient maintaining in 40s  · Cardiology following - see recs  · Echo performed- see report  · Maintain telemetry

## 2023-01-13 NOTE — ASSESSMENT & PLAN NOTE
· BP /40-59 over last 24 hours  · S/p 1.5L total NS bolus and pressors  · Goal MAP >60 - maintaining off pressors  · BP reading appears to be highly differential depending on arm positioning  · Lasix o/h   · Given this and setting of CHF, do not want to overload w/ IVF  · May resume if BP can tolerate  · Patient moved to ICU 1/10 for Levophed gtt  · BP stable and weaned off 1/11  · Breakthrough pain meds stopped  · Likely etiology intrathecal dilaudid pump vs UTI sepsis (unlikely) vs hypothyroid (also unlikely) vs polypharmacy  · Attempted to call Dr. Hillman (patient's pain medicine provider) regarding possibly decreasing dilaudid pump dose, no answer so voicemail left 1/11 and 1/12  · Patient will likely have to f/u outpatient

## 2023-01-13 NOTE — PLAN OF CARE
The sw met with the pt and she's agreeable to snf now. The pt chose [facility] and [facility] in order of preference. The sw faxed the pt's info to the snf's listed above via [employer].       01/13/23 5564   Post-Acute Status   Post-Acute Authorization Placement   Post-Acute Placement Status Referrals Sent   Discharge Plan   Discharge Plan A Skilled Nursing Facility   Discharge Plan B [facility]

## 2023-01-13 NOTE — PROGRESS NOTES
[facility] Medicine  Progress Note    Patient Name: Tasha Hawley  MRN: [id number]  Patient Class: IP- Inpatient   Admission Date: 1/8/2023  Length of Stay: 3 days  Attending Physician: Nic Mistry, *  Primary Care Provider: Mesfin Hodges Ii, MD        Subjective:     Principal Problem:UTI (urinary tract infection)        HPI:  Tasha Villalpando is a 67yr female with PMH Neurogenic bladder s/p suprapubic placement, CHF, hypothyroidism who presents with decreased urine output from catheter and hematuria.    Pt states that she has had pain from suprapubic catheter, with decreased urine output and hematuria. Catheter is changed every three days, per patient. She states she recently had an oupatient urine cultures positive for staph, but pending susceptibilities. She reports allergies to vancomycin and bactrium.    In the ED, initial triage vitals were significant for slightly low Bps. CBC showed microcytic anemia. BMP showed hyponatremia and hypokalemia. UA showed several WBC and RBC with moderate bacteria. UC from 01/06 resulted showing oxacillin and bactrium sensitive staph aureus. BC and UC from 01/08 are pending.    While in the ED, pt complained of lower abdominal pain and decreased output, with apparent clot noted on flushing. With manipulation, provider repositioned catheter, quickly obtaining 300cc of output shortly afterwards. Pt  was started on clindamycin for UTI.    Medicine accepted pt for UTI.      Overview/Hospital Course:  1/10: Patient has been running a consistently low BP and HR over the last 24 hours. She is fatigued but otherwise asymptomatic. She received a 1x bolus of 500 ml NS and a 1x bolus of 1000 ml NS. Per patient and her , BP is regularly low but not quite this low. However, she did just have her intrathecal dilaudid pump increased so that may be the culprit. Lasix is currently o/h and in the setting of CHF do not want to give anymore IVF at this  "time. Also do not want to give midodrine at this time given bradycardia. Cardiology consulted regarding bradycardia as low as 40 bpm. Appreciate their recommendations.    1/11: Patient moved to 1/10 to start pressors and for increased monitoring d/t increasingly decreased HR and bradycardia partnered with increased lethargy (falling asleep while eating and talking). Patient seen during ICU rounding and BP significantly improved while weaning off Levophed gtt. HR still low but patient awake, alert, and participating in conversation appropriately. Attempted to contact patient's outpatient provider who manages her intrathecal dilaudid pump about possibly decreasing dose, as this is thought to possibly be a cause of patient's hypotension and bradycardia. Left voicemail. Will continue weaning off levophed gtt.    1/12: Patient's BP low but stable off Levophed gtt w/ MAP maintaining >60 per ICU recs. HR low but stable. Patient asymptomatic and without acute distress noted. Patient was c/o suprapubic catheter leakage and increased pelvic pain yesterday so urology re-consulted. Per Dr. Villalba, who is familiar w/ this patient, this is a chronic problem. Will likely step down patient out of ICU today.           Interval History: Patient's BP remaining stable. Step-down orders placed yesterday but awaiting a bed. Patient states SPC "gushing" when she sat up to eat breakfast. I assessed it and noted some urine leakage but not a significant amount. Catheter balloon deflated and reinflated, patient tolerated well. Clear UOP in [place] catheter bag.    Review of Systems   Constitutional:  Negative for fatigue and fever.   HENT:  Negative for trouble swallowing.    Respiratory:  Negative for shortness of breath.    Cardiovascular:  Positive for leg swelling. Negative for chest pain and palpitations.   Gastrointestinal:  Negative for abdominal pain.   Genitourinary:  Positive for pelvic pain.        Suprapubic catheter "   Musculoskeletal:  Positive for back pain, gait problem and myalgias.   Neurological:  Positive for weakness. Negative for dizziness, light-headedness, numbness and headaches.   Psychiatric/Behavioral:  Negative for agitation and confusion. The patient is not nervous/anxious.    Objective:     Vital Signs (Most Recent):  Temp: 98.9 °F (37.2 °C) (23 0815)  Pulse: (!) 46 (23 0900)  Resp: (!) 26 (23 0900)  BP: (!) 102/57 (23 0900)  SpO2: (!) 94 % (23 0900)   Vital Signs (24h Range):  Temp:  [98.1 °F (36.7 °C)-98.9 °F (37.2 °C)] 98.9 °F (37.2 °C)  Pulse:  [38-55] 46  Resp:  [9-32] 26  SpO2:  [71 %-100 %] 94 %  BP: ()/(40-59) 102/57     Weight: 85.7 kg (189 lb)  Body mass index is 29.6 kg/m².    Intake/Output Summary (Last 24 hours) at 2023 1144  Last data filed at 2023 1001  Gross per 24 hour   Intake 1777.16 ml   Output 2650 ml   Net -872.84 ml        Physical Exam  Vitals and nursing note reviewed.   Constitutional:       General: She is not in acute distress.     Appearance: She is obese.   HENT:      Head: Normocephalic and atraumatic.   Eyes:      Pupils: Pupils are equal, round, and reactive to light.   Cardiovascular:      Rate and Rhythm: Regular rhythm. Bradycardia present.      Pulses:           Dorsalis pedis pulses are 1+ on the right side and 1+ on the left side.      Heart sounds: Normal heart sounds.   Pulmonary:      Effort: Pulmonary effort is normal. No respiratory distress.      Breath sounds: Decreased breath sounds present.   Abdominal:      General: Bowel sounds are normal. There is no distension.      Palpations: Abdomen is soft.      Tenderness: There is no abdominal tenderness.   Genitourinary:     Comments: Suprapubic catheter  Musculoskeletal:      Right lower le+ Edema present.      Left lower le+ Edema present.   Skin:     General: Skin is warm.   Neurological:      Mental Status: She is alert and oriented to person, place, and time.       Motor: Weakness present.   Psychiatric:         Mood and Affect: Mood normal.         Behavior: Behavior normal.         Thought Content: Thought content normal.         Judgment: Judgment normal.       Significant Labs: All pertinent labs within the past 24 hours have been reviewed.    Significant Imaging: I have reviewed all pertinent imaging results/findings within the past 24 hours.      Assessment/Plan:      * UTI (urinary tract infection)  · ->UA/UC from 01/06 showed staph aureus, sensitive to oxacillin and bacterium  · ->repeat UA and UC from 01/08 - staph aureus  · ->s/p clindamycin in ED.   · ->s/p augmentin for a total 5-7 day course  · Changed to ancef 1/9  · UC 1/8 - staph schleiferi  · Patient had been changed to broad spectrum abx in the setting of possible sepsis 1/10  · Unlikely cause of bradycardia and hypotension  · Lactate NL, afebrile, WBC NL  · Changed back to ancef 1/10  · ID consult placed for recurrent UTIs - continue ancef and f/u w/ urology  · Transition to keflex 500 mg QID at discharge for total 14 day course (end date: 1/25/2023)      Anxiety  · C/w fluoxetine dose      Hypotension  · BP /40-59 over last 24 hours  · S/p 1.5L total NS bolus and pressors  · Goal MAP >60 - maintaining off pressors  · BP reading appears to be highly differential depending on arm positioning  · Lasix o/h   · Given this and setting of CHF, do not want to overload w/ IVF  · May resume if BP can tolerate  · Patient moved to ICU 1/10 for Levophed gtt  · BP stable and weaned off 1/11  · Breakthrough pain meds stopped  · Likely etiology intrathecal dilaudid pump vs UTI sepsis (unlikely) vs hypothyroid (also unlikely) vs polypharmacy  · Attempted to call Dr. Hillman (patient's pain medicine provider) regarding possibly decreasing dilaudid pump dose, no answer so voicemail left 1/11 and 1/12  · Patient will likely have to f/u outpatient      Constipation due to opioid therapy  · C/w aggressive bowel regimen  "      Chronic diastolic heart failure  · Lasix currently o/h in the setting of hypotension  · Dose given 1/10 - good UOP  · May resume if BP tolerates  · Echo performed - see report  · Monitor  · 1.8L fluid restriction      Suprapubic catheter  · Recently exchanged. Exchanged every 3 days  · ED provider repositioned it this admission  · Continue to monitor urine output  · Urology consulted to replace 1/9 - now clear yellow UOP  · 1/11 c/o increased pelvic pain and leakage around SPC  · Urology re-consulted but patient known to Dr. Villalba - states this is a chronic problem and patient can f/u outpatient  · 1/13 patient c/o "gushing" around SPC - upon assessment some leakage noted but not a significant amount  · Deflated and reinflated balloon with 30cc sterile water while maintaining aseptic technique. Catheter not readvanced or repositioned. Patient tolerated well. Clear urine draining well.         Bradycardia  · Low but stable, patient asymptomatic    · HR as low as 39 bpm 1/10 - patient asymptomatic except for fatigue but became more lethargic as the day progressed   · Patient moved to ICU for pressors and HR dropped as low as 20s  · Patient back up into 30s-40s the morning of 1/11 and patient asymptomatic  · Patient maintaining in 40s  · Cardiology following - see recs  · Echo performed- see report  · Maintain telemetry      Primary hypothyroidism  · TSH 51.871  · T4 0.75  · Synthroid increased to 150 mg/d      Coronary artery disease of native artery with stable angina pectoris  · History of CAD with LAD with R-L collaterals  · Continue statin and ASA  · No hematuria present so will continue ASA  · Patient having off and on CP  · Trops negative and EKG shows no ischemic changes  · Cardiology following - see recs  · recommend medical management although limited given hematuria; ASA when cleared by Urology; recommend statin but no BB given baseline bradycardia; could use Imdur starting at 30 mg but will hold off " "given marginal BP and pressor use; currently chest pain free   · echo 1/10 with EF 50-55%; no need for further cardiac workup at this time; recommend cardiology follow up as an outpatient         Narcotic dependency, continuous  · Currently on home pump infusion          Iron deficiency anemia  · H/H stable  · Start daily iron      Sleep apnea  · Patient and daughter state she was never "officially diagnosed" w/ CECI and she doesn't wear CPAP  · ABG on RA: pO2 57; pCO2 53.9; pH 7.397;  HCO3 33.2, %O2 Sat 88  · Needs outpt PFTs, PSG, etc. Continue w/ CPAP w/ sleep.        VTE Risk Mitigation (From admission, onward)         Ordered     Place MALLORIE hose  Until discontinued         01/10/23 1309     enoxaparin injection 40 mg  Daily         01/09/23 0107     IP VTE HIGH RISK PATIENT  Once         01/09/23 0107     Place sequential compression device  Until discontinued         01/09/23 0107                Discharge Planning   JACKIE:      Code Status: Full Code   Is the patient medically ready for discharge?:     Reason for patient still in hospital (select all that apply): Patient trending condition and Treatment  Discharge Plan A: Home Health (the pt's current with [facility])   Discharge Delays: None known at this time        Critical care time spent on the evaluation and treatment of severe organ dysfunction, review of pertinent labs and imaging studies, discussions with consulting providers and discussions with patient/family: 40 minutes.      Lisa Carrasco NP  Department of Hospital Medicine   [place] - Intensive Care  "

## 2023-01-13 NOTE — ASSESSMENT & PLAN NOTE
"· Recently exchanged. Exchanged every 3 days  · ED provider repositioned it this admission  · Continue to monitor urine output  · Urology consulted to replace 1/9 - now clear yellow UOP  · 1/11 c/o increased pelvic pain and leakage around SPC  · Urology re-consulted but patient known to Dr. Villalba - states this is a chronic problem and patient can f/u outpatient  · 1/13 patient c/o "gushing" around SPC - upon assessment some leakage noted but not a significant amount  · Deflated and reinflated balloon with 30cc sterile water while maintaining aseptic technique. Catheter not readvanced or repositioned. Patient tolerated well. Clear urine draining well.       "

## 2023-01-14 PROBLEM — R53.81 DEBILITY: Status: ACTIVE | Noted: 2021-01-13

## 2023-01-14 LAB
ANION GAP SERPL CALC-SCNC: 8 MMOL/L (ref 8–16)
BACTERIA BLD CULT: NORMAL
BASOPHILS # BLD AUTO: 0.02 K/UL (ref 0–0.2)
BASOPHILS NFR BLD: 0.5 % (ref 0–1.9)
BUN SERPL-MCNC: 7 MG/DL (ref 8–23)
CALCIUM SERPL-MCNC: 9 MG/DL (ref 8.7–10.5)
CHLORIDE SERPL-SCNC: 102 MMOL/L (ref 95–110)
CO2 SERPL-SCNC: 30 MMOL/L (ref 23–29)
CREAT SERPL-MCNC: 0.8 MG/DL (ref 0.5–1.4)
DIFFERENTIAL METHOD: ABNORMAL
EOSINOPHIL # BLD AUTO: 0.2 K/UL (ref 0–0.5)
EOSINOPHIL NFR BLD: 5.1 % (ref 0–8)
ERYTHROCYTE [DISTWIDTH] IN BLOOD BY AUTOMATED COUNT: 15.6 % (ref 11.5–14.5)
EST. GFR  (NO RACE VARIABLE): >60 ML/MIN/1.73 M^2
GLUCOSE SERPL-MCNC: 100 MG/DL (ref 70–110)
HCT VFR BLD AUTO: 31.6 % (ref 37–48.5)
HGB BLD-MCNC: 9.6 G/DL (ref 12–16)
IMM GRANULOCYTES # BLD AUTO: 0.02 K/UL (ref 0–0.04)
IMM GRANULOCYTES NFR BLD AUTO: 0.5 % (ref 0–0.5)
LYMPHOCYTES # BLD AUTO: 1.4 K/UL (ref 1–4.8)
LYMPHOCYTES NFR BLD: 31.9 % (ref 18–48)
MAGNESIUM SERPL-MCNC: 2 MG/DL (ref 1.6–2.6)
MCH RBC QN AUTO: 24.8 PG (ref 27–31)
MCHC RBC AUTO-ENTMCNC: 30.4 G/DL (ref 32–36)
MCV RBC AUTO: 82 FL (ref 82–98)
MONOCYTES # BLD AUTO: 0.4 K/UL (ref 0.3–1)
MONOCYTES NFR BLD: 9.7 % (ref 4–15)
NEUTROPHILS # BLD AUTO: 2.3 K/UL (ref 1.8–7.7)
NEUTROPHILS NFR BLD: 52.3 % (ref 38–73)
NRBC BLD-RTO: 0 /100 WBC
PLATELET # BLD AUTO: 235 K/UL (ref 150–450)
PMV BLD AUTO: 9.6 FL (ref 9.2–12.9)
POTASSIUM SERPL-SCNC: 4.5 MMOL/L (ref 3.5–5.1)
RBC # BLD AUTO: 3.87 M/UL (ref 4–5.4)
SODIUM SERPL-SCNC: 140 MMOL/L (ref 136–145)
WBC # BLD AUTO: 4.32 K/UL (ref 3.9–12.7)

## 2023-01-14 PROCEDURE — 85025 COMPLETE CBC W/AUTO DIFF WBC: CPT | Mod: HCNC | Performed by: NURSE PRACTITIONER

## 2023-01-14 PROCEDURE — 25000003 PHARM REV CODE 250: Mod: HCNC | Performed by: NURSE PRACTITIONER

## 2023-01-14 PROCEDURE — 63600175 PHARM REV CODE 636 W HCPCS: Mod: HCNC | Performed by: STUDENT IN AN ORGANIZED HEALTH CARE EDUCATION/TRAINING PROGRAM

## 2023-01-14 PROCEDURE — 80048 BASIC METABOLIC PNL TOTAL CA: CPT | Mod: HCNC | Performed by: NURSE PRACTITIONER

## 2023-01-14 PROCEDURE — 36415 COLL VENOUS BLD VENIPUNCTURE: CPT | Mod: HCNC | Performed by: STUDENT IN AN ORGANIZED HEALTH CARE EDUCATION/TRAINING PROGRAM

## 2023-01-14 PROCEDURE — 99900035 HC TECH TIME PER 15 MIN (STAT): Mod: HCNC

## 2023-01-14 PROCEDURE — 83735 ASSAY OF MAGNESIUM: CPT | Mod: HCNC | Performed by: STUDENT IN AN ORGANIZED HEALTH CARE EDUCATION/TRAINING PROGRAM

## 2023-01-14 PROCEDURE — 11000001 HC ACUTE MED/SURG PRIVATE ROOM: Mod: HCNC

## 2023-01-14 PROCEDURE — 25000003 PHARM REV CODE 250: Mod: HCNC | Performed by: STUDENT IN AN ORGANIZED HEALTH CARE EDUCATION/TRAINING PROGRAM

## 2023-01-14 RX ADMIN — DOCUSATE SODIUM - SENNOSIDES 1 TABLET: 50; 8.6 TABLET, FILM COATED ORAL at 09:01

## 2023-01-14 RX ADMIN — ENOXAPARIN SODIUM 40 MG: 40 INJECTION SUBCUTANEOUS at 04:01

## 2023-01-14 RX ADMIN — CEFAZOLIN SODIUM 2 G: 2 SOLUTION INTRAVENOUS at 11:01

## 2023-01-14 RX ADMIN — FLUOXETINE 60 MG: 20 CAPSULE ORAL at 08:01

## 2023-01-14 RX ADMIN — ACETAMINOPHEN 650 MG: 325 TABLET ORAL at 10:01

## 2023-01-14 RX ADMIN — Medication 6 MG: at 10:01

## 2023-01-14 RX ADMIN — ATORVASTATIN CALCIUM 80 MG: 40 TABLET, FILM COATED ORAL at 08:01

## 2023-01-14 RX ADMIN — FERROUS SULFATE TAB 325 MG (65 MG ELEMENTAL FE) 1 EACH: 325 (65 FE) TAB at 08:01

## 2023-01-14 RX ADMIN — CEFAZOLIN SODIUM 2 G: 2 SOLUTION INTRAVENOUS at 05:01

## 2023-01-14 RX ADMIN — CEFAZOLIN SODIUM 2 G: 2 SOLUTION INTRAVENOUS at 02:01

## 2023-01-14 RX ADMIN — MUPIROCIN: 20 OINTMENT TOPICAL at 09:01

## 2023-01-14 RX ADMIN — ASPIRIN 81 MG: 81 TABLET, COATED ORAL at 08:01

## 2023-01-14 RX ADMIN — ACETAMINOPHEN 650 MG: 325 TABLET ORAL at 03:01

## 2023-01-14 RX ADMIN — DOCUSATE SODIUM - SENNOSIDES 1 TABLET: 50; 8.6 TABLET, FILM COATED ORAL at 08:01

## 2023-01-14 RX ADMIN — PANTOPRAZOLE SODIUM 40 MG: 40 TABLET, DELAYED RELEASE ORAL at 08:01

## 2023-01-14 RX ADMIN — MUPIROCIN: 20 OINTMENT TOPICAL at 08:01

## 2023-01-14 NOTE — PROGRESS NOTES
[facility]  Progress Note    Patient Name: Tasha Hawley  MRN: [id number]  Patient Class: IP- Inpatient   Admission Date: 1/8/2023  Length of Stay: 4 days  Attending Physician: Nic Mistry, *  Primary Care Provider: Mesfin Hodges Ii, MD        Subjective:     Principal Problem:UTI (urinary tract infection)        HPI:  Tasha Villalpando is a 67yr female with PMH Neurogenic bladder s/p suprapubic placement, CHF, hypothyroidism who presents with decreased urine output from catheter and hematuria.    Pt states that she has had pain from suprapubic catheter, with decreased urine output and hematuria. Catheter is changed every three days, per patient. She states she recently had an oupatient urine cultures positive for staph, but pending susceptibilities. She reports allergies to vancomycin and bactrium.    In the ED, initial triage vitals were significant for slightly low Bps. CBC showed microcytic anemia. BMP showed hyponatremia and hypokalemia. UA showed several WBC and RBC with moderate bacteria. UC from 01/06 resulted showing oxacillin and bactrium sensitive staph aureus. BC and UC from 01/08 are pending.    While in the ED, pt complained of lower abdominal pain and decreased output, with apparent clot noted on flushing. With manipulation, provider repositioned catheter, quickly obtaining 300cc of output shortly afterwards. Pt  was started on clindamycin for UTI.    Medicine accepted pt for UTI.      Overview/Hospital Course:  1/10: Patient has been running a consistently low BP and HR over the last 24 hours. She is fatigued but otherwise asymptomatic. She received a 1x bolus of 500 ml NS and a 1x bolus of 1000 ml NS. Per patient and her , BP is regularly low but not quite this low. However, she did just have her intrathecal dilaudid pump increased so that may be the culprit. Lasix is currently o/h and in the setting of CHF do not want to give anymore IVF at this time.  "Also do not want to give midodrine at this time given bradycardia. Cardiology consulted regarding bradycardia as low as 40 bpm. Appreciate their recommendations.    1/11: Patient moved to 1/10 to start pressors and for increased monitoring d/t increasingly decreased HR and bradycardia partnered with increased lethargy (falling asleep while eating and talking). Patient seen during ICU rounding and BP significantly improved while weaning off Levophed gtt. HR still low but patient awake, alert, and participating in conversation appropriately. Attempted to contact patient's outpatient provider who manages her intrathecal dilaudid pump about possibly decreasing dose, as this is thought to possibly be a cause of patient's hypotension and bradycardia. Left voicemail. Will continue weaning off levophed gtt.    1/12: Patient's BP low but stable off Levophed gtt w/ MAP maintaining >60 per ICU recs. HR low but stable. Patient asymptomatic and without acute distress noted. Patient was c/o suprapubic catheter leakage and increased pelvic pain yesterday so urology re-consulted. Per Dr. Villalba, who is familiar w/ this patient, this is a chronic problem. Will likely step down patient out of ICU today.     1/13: Patient's BP remaining stable. Step-down orders placed yesterday but awaiting a bed. Patient states SPC "gushing" when she sat up to eat breakfast. I assessed it and noted some urine leakage but not a significant amount. Catheter balloon deflated and reinflated, patient tolerated well. Clear UOP in [place] catheter bag.      Interval History:  Blood pressure mostly stable today with some intermittent low readings.  Patient is asymptomatic.  Remains bradycardic.  States that she is having some pelvic pain today; we had discussion about increasing dose of opioids, but holding off for now given her low pressures.  Patient in agreement.  No longer having substantial leakage around suprapubic catheter.    Review of Systems "   Constitutional:  Negative for fatigue and fever.   HENT:  Negative for trouble swallowing.    Respiratory:  Negative for shortness of breath.    Cardiovascular:  Positive for leg swelling. Negative for chest pain and palpitations.   Gastrointestinal:  Negative for abdominal pain.   Genitourinary:  Positive for pelvic pain.        Suprapubic catheter   Musculoskeletal:  Positive for back pain, gait problem and myalgias.   Neurological:  Positive for weakness. Negative for dizziness, light-headedness, numbness and headaches.   Psychiatric/Behavioral:  Negative for agitation and confusion. The patient is not nervous/anxious.    Objective:     Vital Signs (Most Recent):  Temp: 97.8 °F (36.6 °C) (01/14/23 0718)  Pulse: (!) 50 (01/14/23 0718)  Resp: 20 (01/14/23 0718)  BP: (!) 89/54 (01/14/23 0718)  SpO2: (!) 93 % (01/14/23 0718)   Vital Signs (24h Range):  Temp:  [97.6 °F (36.4 °C)-98.6 °F (37 °C)] 97.8 °F (36.6 °C)  Pulse:  [41-68] 50  Resp:  [18-40] 20  SpO2:  [93 %-100 %] 93 %  BP: ()/(50-60) 89/54     Weight: 85.7 kg (189 lb)  Body mass index is 29.6 kg/m².    Intake/Output Summary (Last 24 hours) at 1/14/2023 1015  Last data filed at 1/14/2023 0522  Gross per 24 hour   Intake 1318.09 ml   Output 2150 ml   Net -831.91 ml        Physical Exam  Vitals and nursing note reviewed.   Constitutional:       General: She is not in acute distress.     Appearance: She is obese.   HENT:      Head: Normocephalic and atraumatic.   Eyes:      Pupils: Pupils are equal, round, and reactive to light.   Cardiovascular:      Rate and Rhythm: Regular rhythm. Bradycardia present.      Pulses:           Dorsalis pedis pulses are 1+ on the right side and 1+ on the left side.      Heart sounds: Normal heart sounds.   Pulmonary:      Effort: Pulmonary effort is normal. No respiratory distress.      Breath sounds: Decreased breath sounds present.   Abdominal:      General: Bowel sounds are normal. There is no distension.       Palpations: Abdomen is soft.      Tenderness: There is no abdominal tenderness.   Genitourinary:     Comments: Suprapubic catheter  Musculoskeletal:      Right lower le+ Edema present.      Left lower le+ Edema present.   Skin:     General: Skin is warm.   Neurological:      Mental Status: She is alert and oriented to person, place, and time.      Motor: Weakness present.   Psychiatric:         Mood and Affect: Mood normal.         Behavior: Behavior normal.         Thought Content: Thought content normal.         Judgment: Judgment normal.       Significant Labs: All pertinent labs within the past 24 hours have been reviewed.    Significant Imaging: I have reviewed all pertinent imaging results/findings within the past 24 hours.      Assessment/Plan:      * UTI (urinary tract infection)  · ->UA/UC from  showed staph aureus, sensitive to oxacillin and bacterium  · ->repeat UA and UC from  - staph aureus  · ->s/p clindamycin in ED.   · ->s/p augmentin for a total 5-7 day course  · Changed to ancef   · UC  - staph schleiferi  · Patient had been changed to broad spectrum abx in the setting of possible sepsis 1/10  · Unlikely cause of bradycardia and hypotension  · Lactate NL, afebrile, WBC NL  · Changed back to ancef 1/10  · ID consult placed for recurrent UTIs - continue ancef and f/u w/ urology  · Transition to keflex 500 mg QID at discharge for total 14 day course (end date: 2023)      Anxiety  · C/w fluoxetine dose      Debility  · PT/OT recommend SNF; awaiting placement    Hypotension  · BP /40-59 over last 24 hours  · S/p 1.5L total NS bolus and pressors  · Goal MAP >60 - maintaining off pressors  · BP reading appears to be highly differential depending on arm positioning  · Lasix o/h   · Given this and setting of CHF, do not want to overload w/ IVF  · May resume if BP can tolerate  · Patient moved to ICU 1/10 for Levophed gtt  · BP stable and weaned off   · Breakthrough  "pain meds stopped  · Likely etiology intrathecal dilaudid pump vs UTI sepsis (unlikely) vs hypothyroid (also unlikely) vs polypharmacy  · Attempted to call Dr. Hillman (patient's pain medicine provider) regarding possibly decreasing dilaudid pump dose, no answer so voicemail left 1/11 and 1/12  · Patient will likely have to f/u outpatient      Constipation due to opioid therapy  · C/w aggressive bowel regimen       Chronic diastolic heart failure  · Lasix currently o/h in the setting of hypotension  · Dose given 1/10 - good UOP  · May resume if BP tolerates  · Echo performed - see report  · Monitor  · 1.8L fluid restriction      Suprapubic catheter  · Recently exchanged. Exchanged every 3 days  · ED provider repositioned it this admission  · Continue to monitor urine output  · Urology consulted to replace 1/9 - now clear yellow UOP  · 1/11 c/o increased pelvic pain and leakage around SPC  · Urology re-consulted but patient known to Dr. Villalba - states this is a chronic problem and patient can f/u outpatient  · 1/13 patient c/o "gushing" around SPC - upon assessment some leakage noted but not a significant amount  · Deflated and reinflated balloon with 30cc sterile water while maintaining aseptic technique. Catheter not readvanced or repositioned. Patient tolerated well. Clear urine draining well.         Bradycardia  · Low but stable, patient asymptomatic    · HR as low as 39 bpm 1/10 - patient asymptomatic except for fatigue but became more lethargic as the day progressed   · Patient moved to ICU for pressors and HR dropped as low as 20s  · Patient back up into 30s-40s the morning of 1/11 and patient asymptomatic  · Patient maintaining in 40s  · Cardiology following - see recs  · Echo performed- see report  · Maintain telemetry      Primary hypothyroidism  · TSH 51.871  · T4 0.75  · Synthroid increased to 150 mg/d      Coronary artery disease of native artery with stable angina pectoris  · History of CAD with LAD " "with R-L collaterals  · Continue statin and ASA  · No hematuria present so will continue ASA  · Patient having off and on CP  · Trops negative and EKG shows no ischemic changes  · Cardiology following - see recs  · recommend medical management although limited given hematuria; ASA when cleared by Urology; recommend statin but no BB given baseline bradycardia; could use Imdur starting at 30 mg but will hold off given marginal BP and pressor use; currently chest pain free   · echo 1/10 with EF 50-55%; no need for further cardiac workup at this time; recommend cardiology follow up as an outpatient         Narcotic dependency, continuous  · Currently on home pump infusion          Iron deficiency anemia  · H/H stable  · Start daily iron      Sleep apnea  · Patient and daughter state she was never "officially diagnosed" w/ CECI and she doesn't wear CPAP  · ABG on RA: pO2 57; pCO2 53.9; pH 7.397;  HCO3 33.2, %O2 Sat 88  · Needs outpt PFTs, PSG, etc. Continue w/ CPAP w/ sleep.        VTE Risk Mitigation (From admission, onward)         Ordered     Place MALLORIE hose  Until discontinued         01/10/23 1309     enoxaparin injection 40 mg  Daily         01/09/23 0107     IP VTE HIGH RISK PATIENT  Once         01/09/23 0107     Place sequential compression device  Until discontinued         01/09/23 0107                Discharge Planning   JACKIE:      Code Status: Full Code   Is the patient medically ready for discharge?:     Reason for patient still in hospital (select all that apply): Pending disposition  Discharge Plan A: Skilled Nursing Facility   Discharge Delays: None known at this time              Nic Mistry MD  Department of Hospital Medicine   [facility]  "

## 2023-01-14 NOTE — PLAN OF CARE
Patient is awake and alert.  Has complaints of pain and Tylenol given.  Suprapubic catheter intact and draining clear yellow urine.  Up with assistance to bedside commode chair.  Continues to receive antibiotics.  Safety maintained. Bed alarm on.  Will continue to monitor.

## 2023-01-14 NOTE — SUBJECTIVE & OBJECTIVE
Interval History:  Blood pressure mostly stable today with some intermittent low readings.  Patient is asymptomatic.  Remains bradycardic.  States that she is having some pelvic pain today; we had discussion about increasing dose of opioids, but holding off for now given her low pressures.  Patient in agreement.  No longer having substantial leakage around suprapubic catheter.    Review of Systems   Constitutional:  Negative for fatigue and fever.   HENT:  Negative for trouble swallowing.    Respiratory:  Negative for shortness of breath.    Cardiovascular:  Positive for leg swelling. Negative for chest pain and palpitations.   Gastrointestinal:  Negative for abdominal pain.   Genitourinary:  Positive for pelvic pain.        Suprapubic catheter   Musculoskeletal:  Positive for back pain, gait problem and myalgias.   Neurological:  Positive for weakness. Negative for dizziness, light-headedness, numbness and headaches.   Psychiatric/Behavioral:  Negative for agitation and confusion. The patient is not nervous/anxious.    Objective:     Vital Signs (Most Recent):  Temp: 97.8 °F (36.6 °C) (01/14/23 0718)  Pulse: (!) 50 (01/14/23 0718)  Resp: 20 (01/14/23 0718)  BP: (!) 89/54 (01/14/23 0718)  SpO2: (!) 93 % (01/14/23 0718)   Vital Signs (24h Range):  Temp:  [97.6 °F (36.4 °C)-98.6 °F (37 °C)] 97.8 °F (36.6 °C)  Pulse:  [41-68] 50  Resp:  [18-40] 20  SpO2:  [93 %-100 %] 93 %  BP: ()/(50-60) 89/54     Weight: 85.7 kg (189 lb)  Body mass index is 29.6 kg/m².    Intake/Output Summary (Last 24 hours) at 1/14/2023 1015  Last data filed at 1/14/2023 0522  Gross per 24 hour   Intake 1318.09 ml   Output 2150 ml   Net -831.91 ml        Physical Exam  Vitals and nursing note reviewed.   Constitutional:       General: She is not in acute distress.     Appearance: She is obese.   HENT:      Head: Normocephalic and atraumatic.   Eyes:      Pupils: Pupils are equal, round, and reactive to light.   Cardiovascular:      Rate and  Rhythm: Regular rhythm. Bradycardia present.      Pulses:           Dorsalis pedis pulses are 1+ on the right side and 1+ on the left side.      Heart sounds: Normal heart sounds.   Pulmonary:      Effort: Pulmonary effort is normal. No respiratory distress.      Breath sounds: Decreased breath sounds present.   Abdominal:      General: Bowel sounds are normal. There is no distension.      Palpations: Abdomen is soft.      Tenderness: There is no abdominal tenderness.   Genitourinary:     Comments: Suprapubic catheter  Musculoskeletal:      Right lower le+ Edema present.      Left lower le+ Edema present.   Skin:     General: Skin is warm.   Neurological:      Mental Status: She is alert and oriented to person, place, and time.      Motor: Weakness present.   Psychiatric:         Mood and Affect: Mood normal.         Behavior: Behavior normal.         Thought Content: Thought content normal.         Judgment: Judgment normal.       Significant Labs: All pertinent labs within the past 24 hours have been reviewed.    Significant Imaging: I have reviewed all pertinent imaging results/findings within the past 24 hours.

## 2023-01-15 LAB
BACTERIA BLD CULT: NORMAL

## 2023-01-15 PROCEDURE — 63600175 PHARM REV CODE 636 W HCPCS: Mod: HCNC | Performed by: STUDENT IN AN ORGANIZED HEALTH CARE EDUCATION/TRAINING PROGRAM

## 2023-01-15 PROCEDURE — 94761 N-INVAS EAR/PLS OXIMETRY MLT: CPT | Mod: HCNC

## 2023-01-15 PROCEDURE — 25000003 PHARM REV CODE 250: Mod: HCNC | Performed by: STUDENT IN AN ORGANIZED HEALTH CARE EDUCATION/TRAINING PROGRAM

## 2023-01-15 PROCEDURE — 25000003 PHARM REV CODE 250: Mod: HCNC | Performed by: NURSE PRACTITIONER

## 2023-01-15 PROCEDURE — 97530 THERAPEUTIC ACTIVITIES: CPT | Mod: HCNC,CQ

## 2023-01-15 PROCEDURE — 97116 GAIT TRAINING THERAPY: CPT | Mod: HCNC,CQ

## 2023-01-15 PROCEDURE — 21400001 HC TELEMETRY ROOM: Mod: HCNC

## 2023-01-15 PROCEDURE — 99900035 HC TECH TIME PER 15 MIN (STAT): Mod: HCNC

## 2023-01-15 RX ORDER — COSYNTROPIN 0.25 MG/ML
0.25 INJECTION, POWDER, FOR SOLUTION INTRAMUSCULAR; INTRAVENOUS ONCE
Status: COMPLETED | OUTPATIENT
Start: 2023-01-16 | End: 2023-01-16

## 2023-01-15 RX ADMIN — MUPIROCIN: 20 OINTMENT TOPICAL at 08:01

## 2023-01-15 RX ADMIN — ACETAMINOPHEN 650 MG: 325 TABLET ORAL at 08:01

## 2023-01-15 RX ADMIN — ATORVASTATIN CALCIUM 80 MG: 40 TABLET, FILM COATED ORAL at 08:01

## 2023-01-15 RX ADMIN — FERROUS SULFATE TAB 325 MG (65 MG ELEMENTAL FE) 1 EACH: 325 (65 FE) TAB at 08:01

## 2023-01-15 RX ADMIN — CEFAZOLIN SODIUM 2 G: 2 SOLUTION INTRAVENOUS at 05:01

## 2023-01-15 RX ADMIN — ASPIRIN 81 MG: 81 TABLET, COATED ORAL at 08:01

## 2023-01-15 RX ADMIN — FLUOXETINE 60 MG: 20 CAPSULE ORAL at 08:01

## 2023-01-15 RX ADMIN — ACETAMINOPHEN 650 MG: 325 TABLET ORAL at 02:01

## 2023-01-15 RX ADMIN — PANTOPRAZOLE SODIUM 40 MG: 40 TABLET, DELAYED RELEASE ORAL at 08:01

## 2023-01-15 RX ADMIN — DOCUSATE SODIUM - SENNOSIDES 1 TABLET: 50; 8.6 TABLET, FILM COATED ORAL at 08:01

## 2023-01-15 RX ADMIN — ONDANSETRON HYDROCHLORIDE 4 MG: 2 SOLUTION INTRAMUSCULAR; INTRAVENOUS at 09:01

## 2023-01-15 RX ADMIN — CEFAZOLIN SODIUM 2 G: 2 SOLUTION INTRAVENOUS at 09:01

## 2023-01-15 RX ADMIN — ENOXAPARIN SODIUM 40 MG: 40 INJECTION SUBCUTANEOUS at 04:01

## 2023-01-15 RX ADMIN — CEFAZOLIN SODIUM 2 G: 2 SOLUTION INTRAVENOUS at 01:01

## 2023-01-15 RX ADMIN — TRAZODONE HYDROCHLORIDE 300 MG: 100 TABLET ORAL at 08:01

## 2023-01-15 NOTE — ASSESSMENT & PLAN NOTE
· BP /40-59 over last 24 hours  · S/p 1.5L total NS bolus and pressors  · Goal MAP >60 - maintaining off pressors  · BP reading appears to be highly differential depending on arm positioning  · Lasix o/h   · Given this and setting of CHF, do not want to overload w/ IVF  · May resume if BP can tolerate  · Patient moved to ICU 1/10 for Levophed gtt  · BP stable and weaned off 1/11  · Breakthrough pain meds stopped  · Likely etiology intrathecal dilaudid pump vs UTI sepsis (unlikely) vs hypothyroid (also unlikely) vs polypharmacy  · Attempted to call Dr. Hillman (patient's pain medicine provider) regarding possibly decreasing dilaudid pump dose, no answer so voicemail left 1/11 and 1/12  · Patient will likely have to f/u outpatient  · Concern that patient has relative adrenal insufficiency due to opioid use; will check a.m. cortisol and performed stim test

## 2023-01-15 NOTE — PLAN OF CARE
Patient AAOX3 with complaints of pain near the suprapubic catheter. Pain managed with prn medications. Safety maintained. Will continue to monitor.

## 2023-01-15 NOTE — SUBJECTIVE & OBJECTIVE
Interval History:  Stable; blood pressure doing a little better today.  Still reports some lightheadedness and dizziness and pelvic pain.  Continuing Dilaudid pain pump.  She is eating her breakfast this morning and overall not in distress.    Review of Systems   Constitutional:  Negative for fatigue and fever.   HENT:  Negative for trouble swallowing.    Respiratory:  Negative for shortness of breath.    Cardiovascular:  Positive for leg swelling. Negative for chest pain and palpitations.   Gastrointestinal:  Negative for abdominal pain.   Genitourinary:  Positive for pelvic pain.        Suprapubic catheter   Musculoskeletal:  Positive for back pain, gait problem and myalgias.   Neurological:  Positive for weakness. Negative for dizziness, light-headedness, numbness and headaches.   Psychiatric/Behavioral:  Negative for agitation and confusion. The patient is not nervous/anxious.    Objective:     Vital Signs (Most Recent):  Temp: 97.8 °F (36.6 °C) (01/15/23 0715)  Pulse: (!) 47 (01/15/23 0715)  Resp: 20 (01/15/23 0715)  BP: (!) 111/53 (01/15/23 0715)  SpO2: 95 % (01/15/23 0724)   Vital Signs (24h Range):  Temp:  [97.8 °F (36.6 °C)-98.5 °F (36.9 °C)] 97.8 °F (36.6 °C)  Pulse:  [46-68] 47  Resp:  [15-20] 20  SpO2:  [88 %-98 %] 95 %  BP: ()/(47-57) 111/53     Weight: 85.7 kg (189 lb)  Body mass index is 29.6 kg/m².    Intake/Output Summary (Last 24 hours) at 1/15/2023 1008  Last data filed at 1/14/2023 2226  Gross per 24 hour   Intake 121.01 ml   Output 2200 ml   Net -2078.99 ml        Physical Exam  Vitals and nursing note reviewed.   Constitutional:       General: She is not in acute distress.     Appearance: She is obese.   HENT:      Head: Normocephalic and atraumatic.   Eyes:      Pupils: Pupils are equal, round, and reactive to light.   Cardiovascular:      Rate and Rhythm: Regular rhythm. Bradycardia present.      Pulses:           Dorsalis pedis pulses are 1+ on the right side and 1+ on the left side.       Heart sounds: Normal heart sounds.   Pulmonary:      Effort: Pulmonary effort is normal. No respiratory distress.      Breath sounds: Decreased breath sounds present.   Abdominal:      General: Bowel sounds are normal. There is no distension.      Palpations: Abdomen is soft.      Tenderness: There is no abdominal tenderness.   Genitourinary:     Comments: Suprapubic catheter  Musculoskeletal:      Right lower le+ Edema present.      Left lower le+ Edema present.   Skin:     General: Skin is warm.   Neurological:      Mental Status: She is alert and oriented to person, place, and time.      Motor: Weakness present.   Psychiatric:         Mood and Affect: Mood normal.         Behavior: Behavior normal.         Thought Content: Thought content normal.         Judgment: Judgment normal.       Significant Labs: All pertinent labs within the past 24 hours have been reviewed.    Significant Imaging: I have reviewed all pertinent imaging results/findings within the past 24 hours.

## 2023-01-15 NOTE — PLAN OF CARE
Patient on room air with documented SpO2 and in no apparent distress. Will continue to monitor. Pt on CPAP with documented settings. Alarms are set and functioning. Will continue to monitor.

## 2023-01-15 NOTE — PROGRESS NOTES
[facility] Medicine  Progress Note    Patient Name: Tasha Hawley  MRN: [id number]  Patient Class: IP- Inpatient   Admission Date: 1/8/2023  Length of Stay: 5 days  Attending Physician: Nic Mistry, *  Primary Care Provider: Mesfin Hodges Ii, MD        Subjective:     Principal Problem:UTI (urinary tract infection)        HPI:  Tasha Villalpando is a 67yr female with PMH Neurogenic bladder s/p suprapubic placement, CHF, hypothyroidism who presents with decreased urine output from catheter and hematuria.    Pt states that she has had pain from suprapubic catheter, with decreased urine output and hematuria. Catheter is changed every three days, per patient. She states she recently had an oupatient urine cultures positive for staph, but pending susceptibilities. She reports allergies to vancomycin and bactrium.    In the ED, initial triage vitals were significant for slightly low Bps. CBC showed microcytic anemia. BMP showed hyponatremia and hypokalemia. UA showed several WBC and RBC with moderate bacteria. UC from 01/06 resulted showing oxacillin and bactrium sensitive staph aureus. BC and UC from 01/08 are pending.    While in the ED, pt complained of lower abdominal pain and decreased output, with apparent clot noted on flushing. With manipulation, provider repositioned catheter, quickly obtaining 300cc of output shortly afterwards. Pt  was started on clindamycin for UTI.    Medicine accepted pt for UTI.      Overview/Hospital Course:  1/10: Patient has been running a consistently low BP and HR over the last 24 hours. She is fatigued but otherwise asymptomatic. She received a 1x bolus of 500 ml NS and a 1x bolus of 1000 ml NS. Per patient and her , BP is regularly low but not quite this low. However, she did just have her intrathecal dilaudid pump increased so that may be the culprit. Lasix is currently o/h and in the setting of CHF do not want to give anymore IVF at this time.  "Also do not want to give midodrine at this time given bradycardia. Cardiology consulted regarding bradycardia as low as 40 bpm. Appreciate their recommendations.    1/11: Patient moved to 1/10 to start pressors and for increased monitoring d/t increasingly decreased HR and bradycardia partnered with increased lethargy (falling asleep while eating and talking). Patient seen during ICU rounding and BP significantly improved while weaning off Levophed gtt. HR still low but patient awake, alert, and participating in conversation appropriately. Attempted to contact patient's outpatient provider who manages her intrathecal dilaudid pump about possibly decreasing dose, as this is thought to possibly be a cause of patient's hypotension and bradycardia. Left voicemail. Will continue weaning off levophed gtt.    1/12: Patient's BP low but stable off Levophed gtt w/ MAP maintaining >60 per ICU recs. HR low but stable. Patient asymptomatic and without acute distress noted. Patient was c/o suprapubic catheter leakage and increased pelvic pain yesterday so urology re-consulted. Per Dr. Villalba, who is familiar w/ this patient, this is a chronic problem. Will likely step down patient out of ICU today.     1/13: Patient's BP remaining stable. Step-down orders placed yesterday but awaiting a bed. Patient states SPC "gushing" when she sat up to eat breakfast. I assessed it and noted some urine leakage but not a significant amount. Catheter balloon deflated and reinflated, patient tolerated well. Clear UOP in meadows catheter bag.      Interval History:  Stable; blood pressure doing a little better today.  Still reports some lightheadedness and dizziness and pelvic pain.  Continuing Dilaudid pain pump.  She is eating her breakfast this morning and overall not in distress.    Review of Systems   Constitutional:  Negative for fatigue and fever.   HENT:  Negative for trouble swallowing.    Respiratory:  Negative for shortness of breath.  "   Cardiovascular:  Positive for leg swelling. Negative for chest pain and palpitations.   Gastrointestinal:  Negative for abdominal pain.   Genitourinary:  Positive for pelvic pain.        Suprapubic catheter   Musculoskeletal:  Positive for back pain, gait problem and myalgias.   Neurological:  Positive for weakness. Negative for dizziness, light-headedness, numbness and headaches.   Psychiatric/Behavioral:  Negative for agitation and confusion. The patient is not nervous/anxious.    Objective:     Vital Signs (Most Recent):  Temp: 97.8 °F (36.6 °C) (01/15/23 0715)  Pulse: (!) 47 (01/15/23 0715)  Resp: 20 (01/15/23 0715)  BP: (!) 111/53 (01/15/23 0715)  SpO2: 95 % (01/15/23 0724)   Vital Signs (24h Range):  Temp:  [97.8 °F (36.6 °C)-98.5 °F (36.9 °C)] 97.8 °F (36.6 °C)  Pulse:  [46-68] 47  Resp:  [15-20] 20  SpO2:  [88 %-98 %] 95 %  BP: ()/(47-57) 111/53     Weight: 85.7 kg (189 lb)  Body mass index is 29.6 kg/m².    Intake/Output Summary (Last 24 hours) at 1/15/2023 1008  Last data filed at 1/14/2023 2226  Gross per 24 hour   Intake 121.01 ml   Output 2200 ml   Net -2078.99 ml        Physical Exam  Vitals and nursing note reviewed.   Constitutional:       General: She is not in acute distress.     Appearance: She is obese.   HENT:      Head: Normocephalic and atraumatic.   Eyes:      Pupils: Pupils are equal, round, and reactive to light.   Cardiovascular:      Rate and Rhythm: Regular rhythm. Bradycardia present.      Pulses:           Dorsalis pedis pulses are 1+ on the right side and 1+ on the left side.      Heart sounds: Normal heart sounds.   Pulmonary:      Effort: Pulmonary effort is normal. No respiratory distress.      Breath sounds: Decreased breath sounds present.   Abdominal:      General: Bowel sounds are normal. There is no distension.      Palpations: Abdomen is soft.      Tenderness: There is no abdominal tenderness.   Genitourinary:     Comments: Suprapubic catheter  Musculoskeletal:       Right lower le+ Edema present.      Left lower le+ Edema present.   Skin:     General: Skin is warm.   Neurological:      Mental Status: She is alert and oriented to person, place, and time.      Motor: Weakness present.   Psychiatric:         Mood and Affect: Mood normal.         Behavior: Behavior normal.         Thought Content: Thought content normal.         Judgment: Judgment normal.       Significant Labs: All pertinent labs within the past 24 hours have been reviewed.    Significant Imaging: I have reviewed all pertinent imaging results/findings within the past 24 hours.      Assessment/Plan:      * UTI (urinary tract infection)  · ->UA/UC from  showed staph aureus, sensitive to oxacillin and bacterium  · ->repeat UA and UC from  - staph aureus  · ->s/p clindamycin in ED.   · ->s/p augmentin for a total 5-7 day course  · Changed to ancef   · UC  - staph schleiferi  · Patient had been changed to broad spectrum abx in the setting of possible sepsis 1/10  · Unlikely cause of bradycardia and hypotension  · Lactate NL, afebrile, WBC NL  · Changed back to ancef 1/10  · ID consult placed for recurrent UTIs - continue ancef and f/u w/ urology  · Transition to keflex 500 mg QID at discharge for total 14 day course (end date: 2023)      Anxiety  · C/w fluoxetine dose      Debility  · PT/OT recommend SNF; awaiting placement    Hypotension  · BP /40-59 over last 24 hours  · S/p 1.5L total NS bolus and pressors  · Goal MAP >60 - maintaining off pressors  · BP reading appears to be highly differential depending on arm positioning  · Lasix o/h   · Given this and setting of CHF, do not want to overload w/ IVF  · May resume if BP can tolerate  · Patient moved to ICU 1/10 for Levophed gtt  · BP stable and weaned off   · Breakthrough pain meds stopped  · Likely etiology intrathecal dilaudid pump vs UTI sepsis (unlikely) vs hypothyroid (also unlikely) vs polypharmacy  · Attempted to call  "Dr. Hillman (patient's pain medicine provider) regarding possibly decreasing dilaudid pump dose, no answer so voicemail left 1/11 and 1/12  · Patient will likely have to f/u outpatient  · Concern that patient has relative adrenal insufficiency due to opioid use; will check a.m. cortisol and performed stim test        Constipation due to opioid therapy  · C/w aggressive bowel regimen       Chronic diastolic heart failure  · Lasix currently o/h in the setting of hypotension  · Dose given 1/10 - good UOP  · May resume if BP tolerates  · Echo performed - see report  · Monitor  · 1.8L fluid restriction      Suprapubic catheter  · Recently exchanged. Exchanged every 3 days  · ED provider repositioned it this admission  · Continue to monitor urine output  · Urology consulted to replace 1/9 - now clear yellow UOP  · 1/11 c/o increased pelvic pain and leakage around SPC  · Urology re-consulted but patient known to Dr. Villalba - states this is a chronic problem and patient can f/u outpatient  · 1/13 patient c/o "gushing" around SPC - upon assessment some leakage noted but not a significant amount  · Deflated and reinflated balloon with 30cc sterile water while maintaining aseptic technique. Catheter not readvanced or repositioned. Patient tolerated well. Clear urine draining well.         Bradycardia  · Low but stable, patient asymptomatic    · HR as low as 39 bpm 1/10 - patient asymptomatic except for fatigue but became more lethargic as the day progressed   · Patient moved to ICU for pressors and HR dropped as low as 20s  · Patient back up into 30s-40s the morning of 1/11 and patient asymptomatic  · Patient maintaining in 40s  · Cardiology following - see recs  · Echo performed- see report  · Maintain telemetry      Primary hypothyroidism  · TSH 51.871  · T4 0.75  · Synthroid increased to 150 mg/d      Coronary artery disease of native artery with stable angina pectoris  · History of CAD with LAD with R-L " "collaterals  · Continue statin and ASA  · No hematuria present so will continue ASA  · Patient having off and on CP  · Trops negative and EKG shows no ischemic changes  · Cardiology following - see recs  · recommend medical management although limited given hematuria; ASA when cleared by Urology; recommend statin but no BB given baseline bradycardia; could use Imdur starting at 30 mg but will hold off given marginal BP and pressor use; currently chest pain free   · echo 1/10 with EF 50-55%; no need for further cardiac workup at this time; recommend cardiology follow up as an outpatient         Narcotic dependency, continuous  · Currently on home pump infusion          Iron deficiency anemia  · H/H stable  · Start daily iron      Sleep apnea  · Patient and daughter state she was never "officially diagnosed" w/ CECI and she doesn't wear CPAP  · ABG on RA: pO2 57; pCO2 53.9; pH 7.397;  HCO3 33.2, %O2 Sat 88  · Needs outpt PFTs, PSG, etc. Continue w/ CPAP w/ sleep.        VTE Risk Mitigation (From admission, onward)         Ordered     Place MALLORIE hose  Until discontinued         01/10/23 1309     enoxaparin injection 40 mg  Daily         01/09/23 0107     IP VTE HIGH RISK PATIENT  Once         01/09/23 0107     Place sequential compression device  Until discontinued         01/09/23 0107                Discharge Planning   JACKIE:      Code Status: Full Code   Is the patient medically ready for discharge?:     Reason for patient still in hospital (select all that apply): Patient trending condition  Discharge Plan A: Skilled Nursing Facility   Discharge Delays: None known at this time              Nic Mistry MD  Department of Hospital Medicine   [facility]  "

## 2023-01-15 NOTE — PT/OT/SLP PROGRESS
Physical Therapy Treatment    Patient Name:  Tasha Hawley   MRN:  [id number]    Recommendations:     Discharge Recommendations: nursing facility, skilled  Discharge Equipment Recommendations: other (see comments) (Defer to next level of care.)  Barriers to discharge:  Increased assistance needed to perform functional mobility tasks.     Assessment:     Tasha Hawley is a 67 y.o. female admitted with a medical diagnosis of UTI (urinary tract infection).  She presents with the following impairments/functional limitations: impaired endurance, weakness, decreased coordination, decreased lower extremity function, impaired functional mobility, gait instability, decreased safety awareness.    Rehab Prognosis: Good; patient would benefit from acute skilled PT services to address these deficits and reach maximum level of function.    Recent Surgery: * No surgery found *      Plan:     During this hospitalization, patient to be seen 5 x/week to address the identified rehab impairments via gait training, therapeutic activities, therapeutic exercises, neuromuscular re-education and progress toward the following goals:    Plan of Care Expires:  02/09/23    Subjective     Chief Complaint: Pt with no complaints or concerns at this time.   Patient/Family Comments/goals: Pt agreeable to PT treatment.   Pain/Comfort:  Pain Rating 1:  (not rated)      Objective:     Patient found HOB elevated with peripheral IV, telemetry, SCD, meadows catheter upon PT entry to room.     General Precautions: Standard, fall, hearing impaired  Orthopedic Precautions: N/A  Braces: N/A  Respiratory Status: Room air     Functional Mobility:  Bed Mobility:     Supine to Sit: contact guard assistance  Sit to Supine: minimum assistance  Transfers:     Sit to Stand:  contact guard assistance with rolling walker  Gait: Pt ambulated 35 ft with RW, CGA requiring verbal cueing on proper AD placement to maintain base of support as well as center of gravity due  to pt with moderate trunk flexion when ambulation.  Balance: Pt with good sitting and fair standing balance.       AM-PAC 6 CLICK MOBILITY  Turning over in bed (including adjusting bedclothes, sheets and blankets)?: 3  Sitting down on and standing up from a chair with arms (e.g., wheelchair, bedside commode, etc.): 3  Moving from lying on back to sitting on the side of the bed?: 3  Moving to and from a bed to a chair (including a wheelchair)?: 3  Need to walk in hospital room?: 2  Climbing 3-5 steps with a railing?: 1  Basic Mobility Total Score: 15       Treatment & Education:      Patient left HOB elevated with all lines intact and call button in reach..    GOALS:   Multidisciplinary Problems       Physical Therapy Goals          Problem: Physical Therapy    Goal Priority Disciplines Outcome Goal Variances Interventions   Physical Therapy Goal     PT, PT/OT Ongoing, Progressing     Description: Goals to be met by: 23     Patient will increase functional independence with mobility by performin. Supine to sit with Stand-by Assistance  2. Sit to supine with Stand-by Assistance  3. Sit to stand transfer with Stand-by Assistance  4. Bed to chair transfer with Stand-by Assistance using Rolling Walker  5. Gait  x 50 feet with Stand-by Assistance using Rolling Walker.                          Time Tracking:     PT Received On: 01/15/23  PT Start Time: 1334     PT Stop Time: 1408  PT Total Time (min): 34 min     Billable Minutes: Gait Training 15 and Therapeutic Activity 19    Treatment Type: Treatment  PT/PTA: PTA     PTA Visit Number: 1     01/15/2023

## 2023-01-16 LAB
CORTIS SERPL-MCNC: 16.4 UG/DL
CORTIS SERPL-MCNC: 17.2 UG/DL
CORTIS SERPL-MCNC: 8.6 UG/DL

## 2023-01-16 PROCEDURE — 21400001 HC TELEMETRY ROOM: Mod: HCNC

## 2023-01-16 PROCEDURE — 25000003 PHARM REV CODE 250: Mod: HCNC | Performed by: NURSE PRACTITIONER

## 2023-01-16 PROCEDURE — 63600175 PHARM REV CODE 636 W HCPCS: Mod: HCNC | Performed by: HOSPITALIST

## 2023-01-16 PROCEDURE — 25000003 PHARM REV CODE 250: Mod: HCNC | Performed by: STUDENT IN AN ORGANIZED HEALTH CARE EDUCATION/TRAINING PROGRAM

## 2023-01-16 PROCEDURE — 94761 N-INVAS EAR/PLS OXIMETRY MLT: CPT | Mod: HCNC

## 2023-01-16 PROCEDURE — 82533 TOTAL CORTISOL: CPT | Mod: 91,HCNC | Performed by: HOSPITALIST

## 2023-01-16 PROCEDURE — 63600175 PHARM REV CODE 636 W HCPCS: Mod: HCNC | Performed by: STUDENT IN AN ORGANIZED HEALTH CARE EDUCATION/TRAINING PROGRAM

## 2023-01-16 PROCEDURE — 99900035 HC TECH TIME PER 15 MIN (STAT): Mod: HCNC

## 2023-01-16 PROCEDURE — 97116 GAIT TRAINING THERAPY: CPT | Mod: HCNC,CQ

## 2023-01-16 PROCEDURE — 97110 THERAPEUTIC EXERCISES: CPT | Mod: HCNC,CQ

## 2023-01-16 PROCEDURE — 97110 THERAPEUTIC EXERCISES: CPT | Mod: HCNC,CO

## 2023-01-16 PROCEDURE — 36415 COLL VENOUS BLD VENIPUNCTURE: CPT | Mod: HCNC | Performed by: HOSPITALIST

## 2023-01-16 RX ORDER — QUETIAPINE FUMARATE 100 MG/1
100 TABLET, FILM COATED ORAL NIGHTLY PRN
Status: DISCONTINUED | OUTPATIENT
Start: 2023-01-17 | End: 2023-01-19 | Stop reason: HOSPADM

## 2023-01-16 RX ORDER — CEFAZOLIN SODIUM 2 G/50ML
2 SOLUTION INTRAVENOUS
Status: DISCONTINUED | OUTPATIENT
Start: 2023-01-16 | End: 2023-01-19 | Stop reason: HOSPADM

## 2023-01-16 RX ORDER — HYDROCORTISONE 5 MG/1
5 TABLET ORAL NIGHTLY
Status: DISCONTINUED | OUTPATIENT
Start: 2023-01-17 | End: 2023-01-19 | Stop reason: HOSPADM

## 2023-01-16 RX ORDER — HYDROCORTISONE 5 MG/1
10 TABLET ORAL DAILY
Status: DISCONTINUED | OUTPATIENT
Start: 2023-01-17 | End: 2023-01-19 | Stop reason: HOSPADM

## 2023-01-16 RX ORDER — LIDOCAINE 50 MG/G
1 PATCH TOPICAL
Status: DISCONTINUED | OUTPATIENT
Start: 2023-01-16 | End: 2023-01-19 | Stop reason: HOSPADM

## 2023-01-16 RX ORDER — HYDROCORTISONE 5 MG/1
5 TABLET ORAL NIGHTLY
Status: DISCONTINUED | OUTPATIENT
Start: 2023-01-16 | End: 2023-01-16

## 2023-01-16 RX ORDER — QUETIAPINE FUMARATE 100 MG/1
100 TABLET, FILM COATED ORAL 2 TIMES DAILY PRN
Status: DISCONTINUED | OUTPATIENT
Start: 2023-01-16 | End: 2023-01-16

## 2023-01-16 RX ORDER — OXYBUTYNIN CHLORIDE 5 MG/1
5 TABLET, EXTENDED RELEASE ORAL DAILY
Status: DISCONTINUED | OUTPATIENT
Start: 2023-01-16 | End: 2023-01-19 | Stop reason: HOSPADM

## 2023-01-16 RX ORDER — FLUDROCORTISONE ACETATE 0.1 MG/1
100 TABLET ORAL DAILY
Status: DISCONTINUED | OUTPATIENT
Start: 2023-01-17 | End: 2023-01-19 | Stop reason: HOSPADM

## 2023-01-16 RX ADMIN — ENOXAPARIN SODIUM 40 MG: 40 INJECTION SUBCUTANEOUS at 04:01

## 2023-01-16 RX ADMIN — ACETAMINOPHEN 650 MG: 325 TABLET ORAL at 02:01

## 2023-01-16 RX ADMIN — CEFAZOLIN SODIUM 2 G: 2 SOLUTION INTRAVENOUS at 01:01

## 2023-01-16 RX ADMIN — DOCUSATE SODIUM - SENNOSIDES 1 TABLET: 50; 8.6 TABLET, FILM COATED ORAL at 08:01

## 2023-01-16 RX ADMIN — ASPIRIN 81 MG: 81 TABLET, COATED ORAL at 08:01

## 2023-01-16 RX ADMIN — COSYNTROPIN 0.25 MG: 0.25 INJECTION, POWDER, LYOPHILIZED, FOR SOLUTION INTRAVENOUS at 07:01

## 2023-01-16 RX ADMIN — ATORVASTATIN CALCIUM 80 MG: 40 TABLET, FILM COATED ORAL at 08:01

## 2023-01-16 RX ADMIN — PANTOPRAZOLE SODIUM 40 MG: 40 TABLET, DELAYED RELEASE ORAL at 08:01

## 2023-01-16 RX ADMIN — QUETIAPINE FUMARATE 100 MG: 100 TABLET ORAL at 10:01

## 2023-01-16 RX ADMIN — FLUOXETINE 60 MG: 20 CAPSULE ORAL at 08:01

## 2023-01-16 RX ADMIN — DOCUSATE SODIUM - SENNOSIDES 1 TABLET: 50; 8.6 TABLET, FILM COATED ORAL at 10:01

## 2023-01-16 RX ADMIN — OXYBUTYNIN CHLORIDE 5 MG: 5 TABLET, EXTENDED RELEASE ORAL at 10:01

## 2023-01-16 RX ADMIN — TRAZODONE HYDROCHLORIDE 300 MG: 100 TABLET ORAL at 10:01

## 2023-01-16 RX ADMIN — FERROUS SULFATE TAB 325 MG (65 MG ELEMENTAL FE) 1 EACH: 325 (65 FE) TAB at 08:01

## 2023-01-16 NOTE — ASSESSMENT & PLAN NOTE
· BP /40-59 over last 24 hours  · S/p 1.5L total NS bolus and pressors  · Goal MAP >60 - maintaining off pressors  · BP reading appears to be highly differential depending on arm positioning  · Lasix o/h   · Given this and setting of CHF, do not want to overload w/ IVF  · May resume if BP can tolerate  · Patient moved to ICU 1/10 for Levophed gtt  · BP stable and weaned off 1/11  · Breakthrough pain meds stopped  · Likely etiology intrathecal dilaudid pump vs UTI sepsis (unlikely) vs hypothyroid (also unlikely) vs polypharmacy  · Attempted to call Dr. Hillman (patient's pain medicine provider) regarding possibly decreasing dilaudid pump dose, no answer so voicemail left 1/11 and 1/12  · Patient will likely have to f/u outpatient  · Concern that patient has relative adrenal insufficiency due to opioid use; will check a.m. cortisol and performed stim test, pending

## 2023-01-16 NOTE — LETTER
June 17, 2017      Robert Angeles MD  [address]  [facility] [address]           [facility]  [address]  [facility] [address]  Phone: [phone]  Fax: [phone]          Patient: Tasha Hawley   MR Number: [id number]   YOB: 1955   Date of Visit: 6/15/2017       Dear Dr. Robert Angeles:    Thank you for referring Tasha Hawley to me for evaluation. Attached you will find relevant portions of my assessment and plan of care.    If you have questions, please do not hesitate to call me. I look forward to following Tasha Hawley along with you.    Sincerely,    Taurus Govea MD    Enclosure  CC:  No Recipients    If you would like to receive this communication electronically, please contact [email or web address] or [phone] to request more information on [facility] Link access.    For providers and/or their staff who would like to refer a patient to [facility], please contact us through our one-stop-shop provider referral line, [facility], at [phone].    If you feel you have received this communication in error or would no longer like to receive these types of communications, please e-mail [email or web address]          Problem: ALTERED NUTRIENT INTAKE - ADULT  Goal: Nutrient intake appropriate for improving, restoring or maintaining nutritional needs  Description: INTERVENTIONS:  - Assess nutritional status and recommend course of action  - Monitor oral intake, labs, and treatment plans  - Recommend appropriate diets, oral nutritional supplements, and vitamin/mineral supplements  - Provide specific nutrition education as appropriate  Outcome: Progressing

## 2023-01-16 NOTE — PT/OT/SLP PROGRESS
Physical Therapy Treatment    Patient Name:  Tasha Hawley   MRN:  [id number]    Recommendations:     Discharge Recommendations: nursing facility, skilled  Discharge Equipment Recommendations:  (TBD)  Barriers to discharge:  decreased mobility,strength and endurance    Assessment:     Tasha Hawley is a 67 y.o. female admitted with a medical diagnosis of UTI (urinary tract infection).  She presents with the following impairments/functional limitations: impaired endurance, weakness, decreased coordination, decreased lower extremity function, impaired functional mobility, gait instability, decreased safety awareness,pt with good participation and requires assistance with all mobility at this time,pt will benefit from SNF upon discharge.    Rehab Prognosis: Fair; patient would benefit from acute skilled PT services to address these deficits and reach maximum level of function.    Recent Surgery: * No surgery found *      Plan:     During this hospitalization, patient to be seen 5 x/week to address the identified rehab impairments via gait training, therapeutic activities, neuromuscular re-education, therapeutic exercises and progress toward the following goals:    Plan of Care Expires:  02/09/23    Subjective     Chief Complaint: n/a  Patient/Family Comments/goals: pt agreeable to rx.  Pain/Comfort:  Pain Rating 1: 0/10      Objective:     Communicated with nsg prior to session.  Patient found up in chair with peripheral IV, SCD, telemetry upon PT entry to room.     General Precautions: Standard, fall, hearing impaired  Orthopedic Precautions: N/A  Braces: N/A  Respiratory Status: Room air     Functional Mobility:  Bed Mobility:     Supine to Sit: stand by assistance  Transfers:     Sit to Stand:  contact guard assistance with rolling walker  Bed to Chair: contact guard assistance and minimum assistance with  rolling walker  using  ambulation  Gait: amb ~30' with RW and CGA/Min A  Balance: fair standing balance  with RW      AM-PAC 6 CLICK MOBILITY  Turning over in bed (including adjusting bedclothes, sheets and blankets)?: 3  Sitting down on and standing up from a chair with arms (e.g., wheelchair, bedside commode, etc.): 3  Moving from lying on back to sitting on the side of the bed?: 3  Moving to and from a bed to a chair (including a wheelchair)?: 3  Need to walk in hospital room?: 3  Climbing 3-5 steps with a railing?: 1  Basic Mobility Total Score: 16       Treatment & Education: le seated ex's X 10 reps inc: ap,laq,hip flex      Patient left up in chair with all lines intact, call button in reach, and nsg notified..    GOALS: see general POC  Multidisciplinary Problems       Physical Therapy Goals          Problem: Physical Therapy    Goal Priority Disciplines Outcome Goal Variances Interventions   Physical Therapy Goal     PT, PT/OT Ongoing, Progressing     Description: Goals to be met by: 23     Patient will increase functional independence with mobility by performin. Supine to sit with Stand-by Assistance  2. Sit to supine with Stand-by Assistance  3. Sit to stand transfer with Stand-by Assistance  4. Bed to chair transfer with Stand-by Assistance using Rolling Walker  5. Gait  x 50 feet with Stand-by Assistance using Rolling Walker.                          Time Tracking:     PT Received On: 23  PT Start Time: 1032     PT Stop Time: 1057  PT Total Time (min): 25 min     Billable Minutes:  1 0    Treatment Type: Treatment  PT/PTA: PTA     PTA Visit Number: 2     2023

## 2023-01-16 NOTE — PLAN OF CARE
Problem: Occupational Therapy  Goal: Occupational Therapy Goal  Description: Goals to be met by: 02/09/2023      Patient will increase functional independence with ADLs by performing:    UE Dressing with Supervision.  LE Dressing with Minimal Assistance.  Grooming while standing with Stand-by Assistance.  Toileting from bedside commode or toilet with Contact Guard Assistance for hygiene and clothing management.   Toilet transfer to bedside commode or toilet with Contact Guard Assistance.  Increased functional strength to [facility] for self care.  Upper extremity exercise program x10 reps per handout, with independence.     Outcome: Ongoing, Progressing   Tasha Hawley is a 67 y.o. female with a medical diagnosis of UTI (urinary tract infection).  Performance deficits affecting function are weakness, impaired endurance, impaired self care skills, impaired functional mobility, gait instability, impaired balance, decreased upper extremity function, decreased lower extremity function, decreased ROM, impaired cardiopulmonary response to activity, impaired coordination. Pt found in chair, agreeable to chair level activity only 2/2 cold and already walked with PT.  Pt tolerated BUE AROM well. Continue OT services to address functional goals, progressing as able

## 2023-01-16 NOTE — PLAN OF CARE
Pt is AAOX4. Scheduled meds were given per MAR. No C/O N/V, tylenol was given to alleviate pain. Family is not present at the bedside. Bed in lowest position, bed alarm is set. Call light within reach. Nurse will continue to monitor.

## 2023-01-16 NOTE — PLAN OF CARE
Problem: Physical Therapy  Goal: Physical Therapy Goal  Description: Goals to be met by: 23     Patient will increase functional independence with mobility by performin. Supine to sit with Stand-by Assistance  2. Sit to supine with Stand-by Assistance  3. Sit to stand transfer with Stand-by Assistance  4. Bed to chair transfer with Stand-by Assistance using Rolling Walker  5. Gait  x 50 feet with Stand-by Assistance using Rolling Walker.     Outcome: Ongoing, Progressing

## 2023-01-16 NOTE — ASSESSMENT & PLAN NOTE
"· Patient and daughter state she was never "officially diagnosed" w/ CECI and she doesn't wear CPAP  · ABG on RA: pO2 57; pCO2 53.9; pH 7.397;  HCO3 33.2, %O2 Sat 88  · Needs outpatient NIF PFT, MIP/MEP, and sleep study  · Continue w/ CPAP w/ sleep.    "

## 2023-01-16 NOTE — SUBJECTIVE & OBJECTIVE
Interval History:  Stable.  Able to sleep some last night.  Still having some pelvic discomfort, but is improving a little this morning.  Discussed testing for possible adrenal insufficiency today.    Review of Systems   Constitutional:  Negative for fatigue and fever.   HENT:  Negative for trouble swallowing.    Respiratory:  Negative for shortness of breath.    Cardiovascular:  Positive for leg swelling. Negative for chest pain and palpitations.   Gastrointestinal:  Negative for abdominal pain.   Genitourinary:  Positive for pelvic pain.        Suprapubic catheter   Musculoskeletal:  Positive for back pain, gait problem and myalgias.   Neurological:  Positive for weakness. Negative for dizziness, light-headedness, numbness and headaches.   Psychiatric/Behavioral:  Negative for agitation and confusion. The patient is not nervous/anxious.    Objective:     Vital Signs (Most Recent):  Temp: 97.7 °F (36.5 °C) (01/16/23 0743)  Pulse: 66 (01/16/23 0743)  Resp: 17 (01/16/23 0743)  BP: (!) 107/51 (01/16/23 0743)  SpO2: (!) 93 % (01/16/23 0823)   Vital Signs (24h Range):  Temp:  [96.6 °F (35.9 °C)-97.8 °F (36.6 °C)] 97.7 °F (36.5 °C)  Pulse:  [37-66] 66  Resp:  [16-24] 17  SpO2:  [92 %-100 %] 93 %  BP: (101-120)/(51-58) 107/51     Weight: 100.4 kg (221 lb 5.5 oz)  Body mass index is 34.67 kg/m².    Intake/Output Summary (Last 24 hours) at 1/16/2023 0942  Last data filed at 1/16/2023 0631  Gross per 24 hour   Intake 208.23 ml   Output 1050 ml   Net -841.77 ml        Physical Exam  Vitals and nursing note reviewed.   Constitutional:       General: She is not in acute distress.     Appearance: She is obese.   HENT:      Head: Normocephalic and atraumatic.   Eyes:      Pupils: Pupils are equal, round, and reactive to light.   Cardiovascular:      Rate and Rhythm: Regular rhythm. Bradycardia present.      Pulses:           Dorsalis pedis pulses are 1+ on the right side and 1+ on the left side.      Heart sounds: Normal heart  sounds.   Pulmonary:      Effort: Pulmonary effort is normal. No respiratory distress.      Breath sounds: Decreased breath sounds present.   Abdominal:      General: Bowel sounds are normal. There is no distension.      Palpations: Abdomen is soft.      Tenderness: There is no abdominal tenderness.   Genitourinary:     Comments: Suprapubic catheter  Musculoskeletal:      Right lower le+ Edema present.      Left lower le+ Edema present.   Skin:     General: Skin is warm.   Neurological:      Mental Status: She is alert and oriented to person, place, and time.      Motor: Weakness present.   Psychiatric:         Mood and Affect: Mood normal.         Behavior: Behavior normal.         Thought Content: Thought content normal.         Judgment: Judgment normal.       Significant Labs: All pertinent labs within the past 24 hours have been reviewed.    Significant Imaging: I have reviewed all pertinent imaging results/findings within the past 24 hours.

## 2023-01-16 NOTE — PROGRESS NOTES
[facility] Medicine  Progress Note    Patient Name: Tasha Hawley  MRN: [id number]  Patient Class: IP- Inpatient   Admission Date: 1/8/2023  Length of Stay: 6 days  Attending Physician: Nic Mistry, *  Primary Care Provider: Mesfin Hodges Ii, MD        Subjective:     Principal Problem:UTI (urinary tract infection)        HPI:  Tasha Villalpando is a 67yr female with PMH Neurogenic bladder s/p suprapubic placement, CHF, hypothyroidism who presents with decreased urine output from catheter and hematuria.    Pt states that she has had pain from suprapubic catheter, with decreased urine output and hematuria. Catheter is changed every three days, per patient. She states she recently had an oupatient urine cultures positive for staph, but pending susceptibilities. She reports allergies to vancomycin and bactrium.    In the ED, initial triage vitals were significant for slightly low Bps. CBC showed microcytic anemia. BMP showed hyponatremia and hypokalemia. UA showed several WBC and RBC with moderate bacteria. UC from 01/06 resulted showing oxacillin and bactrium sensitive staph aureus. BC and UC from 01/08 are pending.    While in the ED, pt complained of lower abdominal pain and decreased output, with apparent clot noted on flushing. With manipulation, provider repositioned catheter, quickly obtaining 300cc of output shortly afterwards. Pt  was started on clindamycin for UTI.    Medicine accepted pt for UTI.      Overview/Hospital Course:  1/10: Patient has been running a consistently low BP and HR over the last 24 hours. She is fatigued but otherwise asymptomatic. She received a 1x bolus of 500 ml NS and a 1x bolus of 1000 ml NS. Per patient and her , BP is regularly low but not quite this low. However, she did just have her intrathecal dilaudid pump increased so that may be the culprit. Lasix is currently o/h and in the setting of CHF do not want to give anymore IVF at this time.  "Also do not want to give midodrine at this time given bradycardia. Cardiology consulted regarding bradycardia as low as 40 bpm. Appreciate their recommendations.    1/11: Patient moved to 1/10 to start pressors and for increased monitoring d/t increasingly decreased HR and bradycardia partnered with increased lethargy (falling asleep while eating and talking). Patient seen during ICU rounding and BP significantly improved while weaning off Levophed gtt. HR still low but patient awake, alert, and participating in conversation appropriately. Attempted to contact patient's outpatient provider who manages her intrathecal dilaudid pump about possibly decreasing dose, as this is thought to possibly be a cause of patient's hypotension and bradycardia. Left voicemail. Will continue weaning off levophed gtt.    1/12: Patient's BP low but stable off Levophed gtt w/ MAP maintaining >60 per ICU recs. HR low but stable. Patient asymptomatic and without acute distress noted. Patient was c/o suprapubic catheter leakage and increased pelvic pain yesterday so urology re-consulted. Per Dr. Villalba, who is familiar w/ this patient, this is a chronic problem. Will likely step down patient out of ICU today.     1/13: Patient's BP remaining stable. Step-down orders placed yesterday but awaiting a bed. Patient states SPC "gushing" when she sat up to eat breakfast. I assessed it and noted some urine leakage but not a significant amount. Catheter balloon deflated and reinflated, patient tolerated well. Clear UOP in [facility] catheter bag.      Interval History:  Stable.  Able to sleep some last night.  Still having some pelvic discomfort, but is improving a little this morning.  Discussed testing for possible adrenal insufficiency today.    Review of Systems   Constitutional:  Negative for fatigue and fever.   HENT:  Negative for trouble swallowing.    Respiratory:  Negative for shortness of breath.    Cardiovascular:  Positive for leg swelling. " Negative for chest pain and palpitations.   Gastrointestinal:  Negative for abdominal pain.   Genitourinary:  Positive for pelvic pain.        Suprapubic catheter   Musculoskeletal:  Positive for back pain, gait problem and myalgias.   Neurological:  Positive for weakness. Negative for dizziness, light-headedness, numbness and headaches.   Psychiatric/Behavioral:  Negative for agitation and confusion. The patient is not nervous/anxious.    Objective:     Vital Signs (Most Recent):  Temp: 97.7 °F (36.5 °C) (23)  Pulse: 66 (23)  Resp: 17 (23)  BP: (!) 107/51 (23)  SpO2: (!) 93 % (23)   Vital Signs (24h Range):  Temp:  [96.6 °F (35.9 °C)-97.8 °F (36.6 °C)] 97.7 °F (36.5 °C)  Pulse:  [37-66] 66  Resp:  [16-24] 17  SpO2:  [92 %-100 %] 93 %  BP: (101-120)/(51-58) 107/51     Weight: 100.4 kg (221 lb 5.5 oz)  Body mass index is 34.67 kg/m².    Intake/Output Summary (Last 24 hours) at 2023 0942  Last data filed at 2023 0631  Gross per 24 hour   Intake 208.23 ml   Output 1050 ml   Net -841.77 ml        Physical Exam  Vitals and nursing note reviewed.   Constitutional:       General: She is not in acute distress.     Appearance: She is obese.   HENT:      Head: Normocephalic and atraumatic.   Eyes:      Pupils: Pupils are equal, round, and reactive to light.   Cardiovascular:      Rate and Rhythm: Regular rhythm. Bradycardia present.      Pulses:           Dorsalis pedis pulses are 1+ on the right side and 1+ on the left side.      Heart sounds: Normal heart sounds.   Pulmonary:      Effort: Pulmonary effort is normal. No respiratory distress.      Breath sounds: Decreased breath sounds present.   Abdominal:      General: Bowel sounds are normal. There is no distension.      Palpations: Abdomen is soft.      Tenderness: There is no abdominal tenderness.   Genitourinary:     Comments: Suprapubic catheter  Musculoskeletal:      Right lower le+ Edema present.       Left lower le+ Edema present.   Skin:     General: Skin is warm.   Neurological:      Mental Status: She is alert and oriented to person, place, and time.      Motor: Weakness present.   Psychiatric:         Mood and Affect: Mood normal.         Behavior: Behavior normal.         Thought Content: Thought content normal.         Judgment: Judgment normal.       Significant Labs: All pertinent labs within the past 24 hours have been reviewed.    Significant Imaging: I have reviewed all pertinent imaging results/findings within the past 24 hours.      Assessment/Plan:      * UTI (urinary tract infection)  · ->UA/UC from  showed staph aureus, sensitive to oxacillin and bacterium  · ->repeat UA and UC from  - staph aureus  · ->s/p clindamycin in ED.   · ->s/p augmentin for a total 5-7 day course  · Changed to ancef   · UC  - staph schleiferi  · Patient had been changed to broad spectrum abx in the setting of possible sepsis 1/10  · Unlikely cause of bradycardia and hypotension  · Lactate NL, afebrile, WBC NL  · Changed back to ancef 1/10  · ID consult placed for recurrent UTIs - continue ancef and f/u w/ urology  · Transition to keflex 500 mg QID at discharge for total 14 day course (end date: 2023)      Anxiety  · C/w fluoxetine dose      Debility  · PT/OT recommend SNF; awaiting placement    Hypotension  · BP /40-59 over last 24 hours  · S/p 1.5L total NS bolus and pressors  · Goal MAP >60 - maintaining off pressors  · BP reading appears to be highly differential depending on arm positioning  · Lasix o/h   · Given this and setting of CHF, do not want to overload w/ IVF  · May resume if BP can tolerate  · Patient moved to ICU 1/10 for Levophed gtt  · BP stable and weaned off   · Breakthrough pain meds stopped  · Likely etiology intrathecal dilaudid pump vs UTI sepsis (unlikely) vs hypothyroid (also unlikely) vs polypharmacy  · Attempted to call Dr. Hillman (patient's pain medicine  "provider) regarding possibly decreasing dilaudid pump dose, no answer so voicemail left 1/11 and 1/12  · Patient will likely have to f/u outpatient  · Concern that patient has relative adrenal insufficiency due to opioid use; will check a.m. cortisol and performed stim test, pending        Constipation due to opioid therapy  · C/w aggressive bowel regimen       Chronic diastolic heart failure  · Lasix currently o/h in the setting of hypotension  · Dose given 1/10 - good UOP  · May resume if BP tolerates  · Echo performed - see report  · Monitor  · 1.8L fluid restriction      Suprapubic catheter  · Recently exchanged. Exchanged every 3 days  · ED provider repositioned it this admission  · Continue to monitor urine output  · Urology consulted to replace 1/9 - now clear yellow UOP  · 1/11 c/o increased pelvic pain and leakage around SPC  · Urology re-consulted but patient known to Dr. Villalba - states this is a chronic problem and patient can f/u outpatient  · 1/13 patient c/o "gushing" around SPC - upon assessment some leakage noted but not a significant amount  · Deflated and reinflated balloon with 30cc sterile water while maintaining aseptic technique. Catheter not readvanced or repositioned. Patient tolerated well. Clear urine draining well.         Bradycardia  · Low but stable, patient asymptomatic    · HR as low as 39 bpm 1/10 - patient asymptomatic except for fatigue but became more lethargic as the day progressed   · Patient moved to ICU for pressors and HR dropped as low as 20s  · Patient back up into 30s-40s the morning of 1/11 and patient asymptomatic  · Patient maintaining in 40s  · Cardiology following - see recs  · Echo performed- see report  · Maintain telemetry      Primary hypothyroidism  · TSH 51.871  · T4 0.75  · Synthroid increased to 150 mg/d      Coronary artery disease of native artery with stable angina pectoris  · History of CAD with LAD with R-L collaterals  · Continue statin and ASA  · No " "hematuria present so will continue ASA  · Patient having off and on CP  · Trops negative and EKG shows no ischemic changes  · Cardiology following - see recs  · recommend medical management although limited given hematuria; ASA when cleared by Urology; recommend statin but no BB given baseline bradycardia; could use Imdur starting at 30 mg but will hold off given marginal BP and pressor use; currently chest pain free   · echo 1/10 with EF 50-55%; no need for further cardiac workup at this time; recommend cardiology follow up as an outpatient         Narcotic dependency, continuous  · Currently on home pump infusion          Iron deficiency anemia  · H/H stable  · Start daily iron      Sleep apnea  · Patient and daughter state she was never "officially diagnosed" w/ CECI and she doesn't wear CPAP  · ABG on RA: pO2 57; pCO2 53.9; pH 7.397;  HCO3 33.2, %O2 Sat 88  · Needs outpatient NIF PFT, MIP/MEP, and sleep study  · Continue w/ CPAP w/ sleep.        VTE Risk Mitigation (From admission, onward)         Ordered     Place MALLORIE hose  Until discontinued         01/10/23 1309     enoxaparin injection 40 mg  Daily         01/09/23 0107     IP VTE HIGH RISK PATIENT  Once         01/09/23 0107     Place sequential compression device  Until discontinued         01/09/23 0107                Discharge Planning   JACKIE:      Code Status: Full Code   Is the patient medically ready for discharge?:     Reason for patient still in hospital (select all that apply): Patient trending condition and Pending disposition  Discharge Plan A: Skilled Nursing Facility   Discharge Delays: None known at this time              Nic Mistry MD  Department of Hospital Medicine   [facility]  "

## 2023-01-16 NOTE — PLAN OF CARE
Patient is awake and alert.  Hard of hearing.  Has complaints of pain and Tylenol given with relief. Suprapubic catheter is intact and draining clear yellow urine.  Continues to receive antibiotics.  Tolerates all meals without any nausea or vomiting.  Safety maintained.  Will continue to monitor.

## 2023-01-16 NOTE — PT/OT/SLP PROGRESS
Occupational Therapy   Treatment    Name: Tasha Hawley  MRN: [id number]  Admitting Diagnosis:  UTI (urinary tract infection)       Recommendations:     Discharge Recommendations: nursing facility, skilled  Discharge Equipment Recommendations:  other (see comments) (TBD)  Barriers to discharge:  Other (Comment) (Increased level of assistance)    Assessment:     Tasha Hawley is a 67 y.o. female with a medical diagnosis of UTI (urinary tract infection).  Performance deficits affecting function are weakness, impaired endurance, impaired self care skills, impaired functional mobility, gait instability, impaired balance, decreased upper extremity function, decreased lower extremity function, decreased ROM, impaired cardiopulmonary response to activity, impaired coordination. Pt found in chair, agreeable to chair level activity only 2/2 cold and already walked with PT.  Pt tolerated BUE AROM well. Continue OT services to address functional goals, progressing as able.      Rehab Prognosis:  Good; patient would benefit from acute skilled OT services to address these deficits and reach maximum level of function.       Plan:     Patient to be seen 3 x/week to address the above listed problems via self-care/home management, therapeutic activities, therapeutic exercises  Plan of Care Expires: 02/09/23  Plan of Care Reviewed with: patient    Subjective     Pain/Comfort:  Pain Rating 1: 0/10  Pain Rating Post-Intervention 1: 0/10    Objective:     Communicated with: RN prior to session.  Patient found up in chair with peripheral IV, telemetry upon OT entry to room.    General Precautions: Standard, fall, hearing impaired    Orthopedic Precautions:N/A  Braces: N/A     Occupational Performance:     Functional Mobility/Transfers:  Declined    Activities of Daily Living:  Declined      [facility] 6 Click ADL: 15    Treatment & Education:  Pt performed BUE AROM ex 2 x 10 reps all jts/planes.  Pt tolerated well with rest breaks.     Pt politely declined any further therapy 2/2 cold and unwilling to remove covers.      Patient left up in chair with all lines intact and call button in reach    GOALS:   Multidisciplinary Problems       Occupational Therapy Goals          Problem: Occupational Therapy    Goal Priority Disciplines Outcome Interventions   Occupational Therapy Goal     OT, PT/OT Ongoing, Progressing    Description: Goals to be met by: 02/09/2023      Patient will increase functional independence with ADLs by performing:    UE Dressing with Supervision.  LE Dressing with Minimal Assistance.  Grooming while standing with Stand-by Assistance.  Toileting from bedside commode or toilet with Contact Guard Assistance for hygiene and clothing management.   Toilet transfer to bedside commode or toilet with Contact Guard Assistance.  Increased functional strength to WFL for self care.  Upper extremity exercise program x10 reps per handout, with independence.                          Time Tracking:     OT Date of Treatment: 01/16/23  OT Start Time: 1154  OT Stop Time: 1211  OT Total Time (min): 17 min    Billable Minutes:Therapeutic Exercise 17            1/16/2023

## 2023-01-16 NOTE — PLAN OF CARE
Patient is awake and alert.  Sitting up in chair at this time.   at bedside.  Denies pain.  Suprapubic catheter is intact and draining yellow color urine.  Safety maintained.  Will continue to monitor.

## 2023-01-17 PROBLEM — E27.40 ADRENAL INSUFFICIENCY: Status: ACTIVE | Noted: 2023-01-17

## 2023-01-17 LAB
ALBUMIN SERPL BCP-MCNC: 2.5 G/DL (ref 3.5–5.2)
ALP SERPL-CCNC: 89 U/L (ref 55–135)
ALT SERPL W/O P-5'-P-CCNC: <5 U/L (ref 10–44)
ANION GAP SERPL CALC-SCNC: 7 MMOL/L (ref 8–16)
AST SERPL-CCNC: 12 U/L (ref 10–40)
BASOPHILS # BLD AUTO: 0.02 K/UL (ref 0–0.2)
BASOPHILS NFR BLD: 0.4 % (ref 0–1.9)
BILIRUB SERPL-MCNC: 0.2 MG/DL (ref 0.1–1)
BUN SERPL-MCNC: 9 MG/DL (ref 8–23)
CALCIUM SERPL-MCNC: 8.7 MG/DL (ref 8.7–10.5)
CHLORIDE SERPL-SCNC: 104 MMOL/L (ref 95–110)
CO2 SERPL-SCNC: 29 MMOL/L (ref 23–29)
CREAT SERPL-MCNC: 0.8 MG/DL (ref 0.5–1.4)
DIFFERENTIAL METHOD: ABNORMAL
EOSINOPHIL # BLD AUTO: 0.1 K/UL (ref 0–0.5)
EOSINOPHIL NFR BLD: 3.1 % (ref 0–8)
ERYTHROCYTE [DISTWIDTH] IN BLOOD BY AUTOMATED COUNT: 15.4 % (ref 11.5–14.5)
EST. GFR  (NO RACE VARIABLE): >60 ML/MIN/1.73 M^2
GLUCOSE SERPL-MCNC: 90 MG/DL (ref 70–110)
HCT VFR BLD AUTO: 30.6 % (ref 37–48.5)
HGB BLD-MCNC: 9.1 G/DL (ref 12–16)
IMM GRANULOCYTES # BLD AUTO: 0.03 K/UL (ref 0–0.04)
IMM GRANULOCYTES NFR BLD AUTO: 0.7 % (ref 0–0.5)
LYMPHOCYTES # BLD AUTO: 1.5 K/UL (ref 1–4.8)
LYMPHOCYTES NFR BLD: 32.3 % (ref 18–48)
MAGNESIUM SERPL-MCNC: 1.9 MG/DL (ref 1.6–2.6)
MCH RBC QN AUTO: 25.1 PG (ref 27–31)
MCHC RBC AUTO-ENTMCNC: 29.7 G/DL (ref 32–36)
MCV RBC AUTO: 84 FL (ref 82–98)
MONOCYTES # BLD AUTO: 0.4 K/UL (ref 0.3–1)
MONOCYTES NFR BLD: 8.5 % (ref 4–15)
NEUTROPHILS # BLD AUTO: 2.5 K/UL (ref 1.8–7.7)
NEUTROPHILS NFR BLD: 55 % (ref 38–73)
NRBC BLD-RTO: 0 /100 WBC
PHOSPHATE SERPL-MCNC: 3.2 MG/DL (ref 2.7–4.5)
PLATELET # BLD AUTO: 217 K/UL (ref 150–450)
PMV BLD AUTO: 9.9 FL (ref 9.2–12.9)
POTASSIUM SERPL-SCNC: 4.4 MMOL/L (ref 3.5–5.1)
PROT SERPL-MCNC: 6 G/DL (ref 6–8.4)
RBC # BLD AUTO: 3.63 M/UL (ref 4–5.4)
SARS-COV-2 RDRP RESP QL NAA+PROBE: NEGATIVE
SODIUM SERPL-SCNC: 140 MMOL/L (ref 136–145)
TROPONIN I SERPL DL<=0.01 NG/ML-MCNC: <0.006 NG/ML (ref 0–0.03)
WBC # BLD AUTO: 4.58 K/UL (ref 3.9–12.7)

## 2023-01-17 PROCEDURE — 97110 THERAPEUTIC EXERCISES: CPT | Mod: HCNC,CQ

## 2023-01-17 PROCEDURE — 63600175 PHARM REV CODE 636 W HCPCS: Mod: HCNC | Performed by: STUDENT IN AN ORGANIZED HEALTH CARE EDUCATION/TRAINING PROGRAM

## 2023-01-17 PROCEDURE — 21400001 HC TELEMETRY ROOM: Mod: HCNC

## 2023-01-17 PROCEDURE — 25000003 PHARM REV CODE 250: Mod: HCNC | Performed by: STUDENT IN AN ORGANIZED HEALTH CARE EDUCATION/TRAINING PROGRAM

## 2023-01-17 PROCEDURE — 85025 COMPLETE CBC W/AUTO DIFF WBC: CPT | Mod: HCNC | Performed by: HOSPITALIST

## 2023-01-17 PROCEDURE — 25000003 PHARM REV CODE 250: Mod: HCNC | Performed by: NURSE PRACTITIONER

## 2023-01-17 PROCEDURE — 63600175 PHARM REV CODE 636 W HCPCS: Mod: HCNC | Performed by: NURSE PRACTITIONER

## 2023-01-17 PROCEDURE — 99900035 HC TECH TIME PER 15 MIN (STAT): Mod: HCNC

## 2023-01-17 PROCEDURE — 84100 ASSAY OF PHOSPHORUS: CPT | Mod: HCNC | Performed by: HOSPITALIST

## 2023-01-17 PROCEDURE — 93005 ELECTROCARDIOGRAM TRACING: CPT | Mod: HCNC

## 2023-01-17 PROCEDURE — 80053 COMPREHEN METABOLIC PANEL: CPT | Mod: HCNC | Performed by: HOSPITALIST

## 2023-01-17 PROCEDURE — 36415 COLL VENOUS BLD VENIPUNCTURE: CPT | Mod: HCNC | Performed by: HOSPITALIST

## 2023-01-17 PROCEDURE — 82088 ASSAY OF ALDOSTERONE: CPT | Mod: HCNC | Performed by: HOSPITALIST

## 2023-01-17 PROCEDURE — 30200315 PPD INTRADERMAL TEST REV CODE 302: Mod: HCNC | Performed by: HOSPITALIST

## 2023-01-17 PROCEDURE — 25000003 PHARM REV CODE 250: Mod: HCNC | Performed by: HOSPITALIST

## 2023-01-17 PROCEDURE — 25000003 PHARM REV CODE 250: Mod: HCNC | Performed by: INTERNAL MEDICINE

## 2023-01-17 PROCEDURE — 63600175 PHARM REV CODE 636 W HCPCS: Mod: HCNC | Performed by: HOSPITALIST

## 2023-01-17 PROCEDURE — U0002 COVID-19 LAB TEST NON-CDC: HCPCS | Mod: HCNC | Performed by: HOSPITALIST

## 2023-01-17 PROCEDURE — 86580 TB INTRADERMAL TEST: CPT | Mod: HCNC | Performed by: HOSPITALIST

## 2023-01-17 PROCEDURE — 93010 EKG 12-LEAD: ICD-10-PCS | Mod: 76,HCNC,, | Performed by: INTERNAL MEDICINE

## 2023-01-17 PROCEDURE — 94660 CPAP INITIATION&MGMT: CPT | Mod: HCNC

## 2023-01-17 PROCEDURE — 94761 N-INVAS EAR/PLS OXIMETRY MLT: CPT | Mod: HCNC

## 2023-01-17 PROCEDURE — 82024 ASSAY OF ACTH: CPT | Mod: HCNC | Performed by: HOSPITALIST

## 2023-01-17 PROCEDURE — 83735 ASSAY OF MAGNESIUM: CPT | Mod: HCNC | Performed by: HOSPITALIST

## 2023-01-17 PROCEDURE — 84484 ASSAY OF TROPONIN QUANT: CPT | Mod: HCNC | Performed by: HOSPITALIST

## 2023-01-17 PROCEDURE — 27000221 HC OXYGEN, UP TO 24 HOURS: Mod: HCNC

## 2023-01-17 PROCEDURE — 93010 ELECTROCARDIOGRAM REPORT: CPT | Mod: 76,HCNC,, | Performed by: INTERNAL MEDICINE

## 2023-01-17 PROCEDURE — 97530 THERAPEUTIC ACTIVITIES: CPT | Mod: HCNC,CQ

## 2023-01-17 RX ORDER — HYDROCODONE BITARTRATE AND ACETAMINOPHEN 5; 325 MG/1; MG/1
1 TABLET ORAL EVERY 8 HOURS PRN
Status: DISCONTINUED | OUTPATIENT
Start: 2023-01-17 | End: 2023-01-19 | Stop reason: HOSPADM

## 2023-01-17 RX ORDER — FUROSEMIDE 10 MG/ML
40 INJECTION INTRAMUSCULAR; INTRAVENOUS ONCE
Status: COMPLETED | OUTPATIENT
Start: 2023-01-17 | End: 2023-01-17

## 2023-01-17 RX ADMIN — LIDOCAINE 1 PATCH: 50 PATCH CUTANEOUS at 05:01

## 2023-01-17 RX ADMIN — FERROUS SULFATE TAB 325 MG (65 MG ELEMENTAL FE) 1 EACH: 325 (65 FE) TAB at 09:01

## 2023-01-17 RX ADMIN — PANTOPRAZOLE SODIUM 40 MG: 40 TABLET, DELAYED RELEASE ORAL at 09:01

## 2023-01-17 RX ADMIN — HYDROCORTISONE 5 MG: 5 TABLET ORAL at 09:01

## 2023-01-17 RX ADMIN — CEFAZOLIN SODIUM 2 G: 2 SOLUTION INTRAVENOUS at 04:01

## 2023-01-17 RX ADMIN — ASPIRIN 81 MG: 81 TABLET, COATED ORAL at 09:01

## 2023-01-17 RX ADMIN — ENOXAPARIN SODIUM 40 MG: 40 INJECTION SUBCUTANEOUS at 05:01

## 2023-01-17 RX ADMIN — ATORVASTATIN CALCIUM 80 MG: 40 TABLET, FILM COATED ORAL at 09:01

## 2023-01-17 RX ADMIN — HYDROCODONE BITARTRATE AND ACETAMINOPHEN 1 TABLET: 5; 325 TABLET ORAL at 03:01

## 2023-01-17 RX ADMIN — TRAZODONE HYDROCHLORIDE 300 MG: 100 TABLET ORAL at 09:01

## 2023-01-17 RX ADMIN — ACETAMINOPHEN 650 MG: 325 TABLET ORAL at 11:01

## 2023-01-17 RX ADMIN — FUROSEMIDE 40 MG: 10 INJECTION, SOLUTION INTRAMUSCULAR; INTRAVENOUS at 01:01

## 2023-01-17 RX ADMIN — CEFAZOLIN SODIUM 2 G: 2 SOLUTION INTRAVENOUS at 09:01

## 2023-01-17 RX ADMIN — HYDROCORTISONE 10 MG: 5 TABLET ORAL at 09:01

## 2023-01-17 RX ADMIN — OXYBUTYNIN CHLORIDE 5 MG: 5 TABLET, EXTENDED RELEASE ORAL at 09:01

## 2023-01-17 RX ADMIN — ONDANSETRON HYDROCHLORIDE 4 MG: 2 SOLUTION INTRAMUSCULAR; INTRAVENOUS at 11:01

## 2023-01-17 RX ADMIN — QUETIAPINE FUMARATE 100 MG: 100 TABLET ORAL at 09:01

## 2023-01-17 RX ADMIN — CEFAZOLIN SODIUM 2 G: 2 SOLUTION INTRAVENOUS at 02:01

## 2023-01-17 RX ADMIN — DOCUSATE SODIUM - SENNOSIDES 1 TABLET: 50; 8.6 TABLET, FILM COATED ORAL at 09:01

## 2023-01-17 RX ADMIN — SODIUM CHLORIDE, POTASSIUM CHLORIDE, SODIUM LACTATE AND CALCIUM CHLORIDE 250 ML: 600; 310; 30; 20 INJECTION, SOLUTION INTRAVENOUS at 10:01

## 2023-01-17 RX ADMIN — TUBERCULIN PURIFIED PROTEIN DERIVATIVE 5 UNITS: 5 INJECTION, SOLUTION INTRADERMAL at 01:01

## 2023-01-17 RX ADMIN — FLUDROCORTISONE ACETATE 100 MCG: 0.1 TABLET ORAL at 09:01

## 2023-01-17 RX ADMIN — FLUOXETINE 60 MG: 20 CAPSULE ORAL at 09:01

## 2023-01-17 RX ADMIN — PROMETHAZINE HYDROCHLORIDE 25 MG: 25 TABLET ORAL at 02:01

## 2023-01-17 NOTE — PT/OT/SLP PROGRESS
Occupational Therapy      Patient Name:  Tasha Hawley   MRN:  [id number]    Patient not seen today secondary to Patient unwilling to participate (AM 1133: Pt declined therapy 2/2 reports of nausea, chest pain and SOB.  HR 44.  RN notfied.). Will follow-up at later time.    1/17/2023

## 2023-01-17 NOTE — PT/OT/SLP PROGRESS
Physical Therapy Treatment    Patient Name:  Tasha Hawley   MRN:  [id number]    Recommendations:     Discharge Recommendations: nursing facility, skilled  Discharge Equipment Recommendations:  (TBD)  Barriers to discharge:  decreased mobility,strength and endurance    Assessment:     Tasha Hawley is a 67 y.o. female admitted with a medical diagnosis of UTI (urinary tract infection).  She presents with the following impairments/functional limitations: weakness, impaired endurance, impaired functional mobility, gait instability, impaired balance, decreased upper extremity function, decreased lower extremity function, decreased ROM, impaired coordination, impaired skin,pt with good participation and limited today by increased fatigue and having a bad night,pt with some chest discomfort,nsg notified,pt will benefit from SNF upon discharge.    Rehab Prognosis: Fair; patient would benefit from acute skilled PT services to address these deficits and reach maximum level of function.    Recent Surgery: * No surgery found *      Plan:     During this hospitalization, patient to be seen 5 x/week to address the identified rehab impairments via gait training, therapeutic activities, therapeutic exercises, neuromuscular re-education and progress toward the following goals:    Plan of Care Expires:  02/09/23    Subjective     Chief Complaint: n/a  Patient/Family Comments/goals: pt had a very bad night.  Pain/Comfort:  Pain Rating 1: 0/10      Objective:     Communicated with nsg prior to session.  Patient found supine with bed alarm, peripheral IV, SCD, telemetry upon PT entry to room.     General Precautions: Standard, fall, hearing impaired  Orthopedic Precautions: N/A  Braces: N/A  Respiratory Status: Room air     Functional Mobility:  Gait: n/a      AM-PAC 6 CLICK MOBILITY  Turning over in bed (including adjusting bedclothes, sheets and blankets)?: 3  Sitting down on and standing up from a chair with arms (e.g.,  wheelchair, bedside commode, etc.): 3  Moving from lying on back to sitting on the side of the bed?: 3  Moving to and from a bed to a chair (including a wheelchair)?: 3  Need to walk in hospital room?: 3       Treatment & Education: gentle le SKYLER within tolerance only and rest periods,set up pt to brush her teeth and wiped face with Mod I.      Patient left supine with all lines intact, call button in reach, bed alarm on, and nsg notified..    GOALS: see general POC  Multidisciplinary Problems       Physical Therapy Goals          Problem: Physical Therapy    Goal Priority Disciplines Outcome Goal Variances Interventions   Physical Therapy Goal     PT, PT/OT Ongoing, Progressing     Description: Goals to be met by: 23     Patient will increase functional independence with mobility by performin. Supine to sit with Stand-by Assistance  2. Sit to supine with Stand-by Assistance  3. Sit to stand transfer with Stand-by Assistance  4. Bed to chair transfer with Stand-by Assistance using Rolling Walker  5. Gait  x 50 feet with Stand-by Assistance using Rolling Walker.                          Time Tracking:     PT Received On: 23  PT Start Time: 924     PT Stop Time: 950  PT Total Time (min): 26 min     Billable Minutes: Therapeutic Activity 18 and Therapeutic Exercise 8    Treatment Type: Treatment  PT/PTA: PTA     PTA Visit Number: 3     2023

## 2023-01-17 NOTE — PLAN OF CARE
AAOx4. PRN pain and nausea meds given. Tolerating cardiac diet. IV ABX given per MAR. Suprapubic cath intact. EKG and chest xray performed today. Cardiac monitoring maintained, remains sinus riley. Bed locked in lowest position, bed alarm set and call bell within reach.

## 2023-01-17 NOTE — ASSESSMENT & PLAN NOTE
· BP /40-59 over last 24 hours  · S/p 1.5L total NS bolus and pressors  · Goal MAP >60 - maintaining off pressors  · BP reading appears to be highly differential depending on arm positioning  · Lasix o/h   · Given this and setting of CHF, do not want to overload w/ IVF  · May resume if BP can tolerate  · Patient moved to ICU 1/10 for Levophed gtt  · BP stable and weaned off 1/11  · Breakthrough pain meds stopped  · Likely etiology intrathecal dilaudid pump vs UTI sepsis (unlikely) vs hypothyroid (also unlikely) vs polypharmacy  · Attempted to call Dr. Hillman (patient's pain medicine provider) regarding possibly decreasing dilaudid pump dose, no answer so voicemail left 1/11 and 1/12  · Patient will likely have to f/u outpatient  · Concern that patient has relative adrenal insufficiency due to opioid use; am cortisol and stim test c/w adrenal insufficiency (see below)

## 2023-01-17 NOTE — ASSESSMENT & PLAN NOTE
· Possibly secondary due to opioid use   · Cosyntropin stimulation test performed yesterday without levels above 18  · Initiate on fludrocortisone and hydrocortisone   · Will refer to endocrinology as outpatient  · Obtain ACTH and Aldactone level today

## 2023-01-17 NOTE — PLAN OF CARE
Patient to continue ancef per Dr Mistry and Dr. Acharya's notes.  Medication completed on MAR, no administrations remaining.  Per Elisabet Fishman NP, ok to resume ancef.

## 2023-01-17 NOTE — PLAN OF CARE
Problem: Adult Inpatient Plan of Care  Goal: Plan of Care Review  Outcome: Ongoing, Progressing    Chart check complete. Vitals, orders, labs, and progress notes reviewed. Care plan updated. Will monitor.         Scalpel Size: 15 blade

## 2023-01-17 NOTE — PLAN OF CARE
went to meet with patient. Patient confirmed plan is SNF at discharge. I [email or web address] cannot accept patient. No responses from [facility] or [facility] yet. [facility] has accepted patient for SNF. Patient is agreeable to discharge to [facility] if approved. I discussed SNF process and insurance authorization needed.  attempted to call patient's , unable to reach. I called [facility] to confirm they can accept patient and ok to request authorization. Allen was in meeting, will follow-up. MD team confirms patient is medically ready to discharge today.    1150-- spoke with Allen with [facility]. She requested a TB skin test and Chest-Xray. Rapid COVID test needs to be completed 24-48 hours prior to admit. She is also reaching out to her financial department to make sure they can accept. I spoke with ICU SW Fallon. PASRR completed but LOCET needs to be called in. CM will call in LOCET.    1212--I attempted to all in LOCET x2 and stayed on phone >10 minutes each. Will attempt again this afternoon.    1320--I tried calling LOCET in again, stayed in phone >10 minutes, unable to reach anyone again.    1324--Spoke with representative at [facility]. I provided them with my contact information for call back. Dr. Mistry stated ok to work on insurance auth for anticipated discharge tomorrow.    1326--Attempted to call patient's  again, no response.    1418--Spoke with patient's  and he is aware and agreeable to discharge plan to SNF. LOCET called in, will fax PASRR. I SPOKE WITH ALLEN AT [facility]. SHE WAS NOTIFIED UPDATED CLINICALS SENT. SHE WILL GET WITH THEIR  TO MAKE SURE EVERYTHING CHECKS OUT ON THEIR END. SHE WILL THEN SUBMIT TO [employer]. SHE WILL REACH OUT TO FAMILY AS WELL. I TOLD HER I JUST SPOKE WITH  TOO.    Your fax has been successfully sent to [phone]  at [phone]. --I requested 142 be sent to Torres's email. I did not receive it yet.    [facility] Phone: [phone]    Patient Contacts    Name Relation Home Work Mobile   Dangelo Hawley Spouse [phone]   Lisa Thompson   [phone]   Saray Thompson   [phone]       Future Appointments   Date Time Provider Department Center   1/26/2023 11:00 AM Cat Covington NP [facility] UROLOGY [facility]   2/17/2023  8:00 AM Juan A Madera MD [facility] PSYCH [facility]   3/3/2023  2:00 PM Sunni Starks MD [facility] DERM [facility]   7/7/2023 11:00 AM Mesfin Hodges II, MD [facility] IM [facility] PCW       01/17/23 1054   Discharge Reassessment   Assessment Type Discharge Planning Reassessment   Did the patient's condition or plan change since previous assessment? No   Discharge Plan discussed with: Patient   Discharge Plan A Skilled Nursing Facility   Discharge Plan B Home with family;Home Health   DME Needed Upon Discharge  other (see comments)  (Plan is SNF at discharge.)   Discharge Barriers Identified None   Post-Acute Status   Post-Acute Authorization Placement   Post-Acute Placement Status Referrals Sent   Hospital Resources/Appts/Education Provided Appointments scheduled by Navigator/Coordinator   Discharge Delays None known at this time     Effie Mcgill RN    [phone]

## 2023-01-17 NOTE — NURSING
Pt has been having pauses in HR on cardiac monitor. Longest one is 2.27 seconds with HR at 20. Pt appears to be pausing every 15-30 seconds with HR sustaining in 30's. Jayleen Naik NP notified. Orders placed for stat EKG and NP states she will come to bedside to assess patient.

## 2023-01-17 NOTE — PLAN OF CARE
AAO X4. Regular diet continued. Pt denies N/V, SOB, distress, and pain. Sinus Ilya on tele. See previous notes. Medications given per MAR. Vital signs stable throughout the night. Safety precautions maintained. CPAP on through the night. Bed alarm set. Call bell within reach. Patient encouraged to call for assistance.

## 2023-01-17 NOTE — PROGRESS NOTES
[facility] Medicine  Progress Note    Patient Name: Tasha Hawley  MRN: [id number]  Patient Class: IP- Inpatient   Admission Date: 1/8/2023  Length of Stay: 7 days  Attending Physician: Nic Mistry, *  Primary Care Provider: Mesfin Hodges Ii, MD        Subjective:     Principal Problem:UTI (urinary tract infection)        HPI:  Tasha Villalpando is a 67yr female with PMH Neurogenic bladder s/p suprapubic placement, CHF, hypothyroidism who presents with decreased urine output from catheter and hematuria.    Pt states that she has had pain from suprapubic catheter, with decreased urine output and hematuria. Catheter is changed every three days, per patient. She states she recently had an oupatient urine cultures positive for staph, but pending susceptibilities. She reports allergies to vancomycin and bactrium.    In the ED, initial triage vitals were significant for slightly low Bps. CBC showed microcytic anemia. BMP showed hyponatremia and hypokalemia. UA showed several WBC and RBC with moderate bacteria. UC from 01/06 resulted showing oxacillin and bactrium sensitive staph aureus. BC and UC from 01/08 are pending.    While in the ED, pt complained of lower abdominal pain and decreased output, with apparent clot noted on flushing. With manipulation, provider repositioned catheter, quickly obtaining 300cc of output shortly afterwards. Pt  was started on clindamycin for UTI.    Medicine accepted pt for UTI.      Overview/Hospital Course:  1/10: Patient has been running a consistently low BP and HR over the last 24 hours. She is fatigued but otherwise asymptomatic. She received a 1x bolus of 500 ml NS and a 1x bolus of 1000 ml NS. Per patient and her , BP is regularly low but not quite this low. However, she did just have her intrathecal dilaudid pump increased so that may be the culprit. Lasix is currently o/h and in the setting of CHF do not want to give anymore IVF at this time.  "Also do not want to give midodrine at this time given bradycardia. Cardiology consulted regarding bradycardia as low as 40 bpm. Appreciate their recommendations.    1/11: Patient moved to 1/10 to start pressors and for increased monitoring d/t increasingly decreased HR and bradycardia partnered with increased lethargy (falling asleep while eating and talking). Patient seen during ICU rounding and BP significantly improved while weaning off Levophed gtt. HR still low but patient awake, alert, and participating in conversation appropriately. Attempted to contact patient's outpatient provider who manages her intrathecal dilaudid pump about possibly decreasing dose, as this is thought to possibly be a cause of patient's hypotension and bradycardia. Left voicemail. Will continue weaning off levophed gtt.    1/12: Patient's BP low but stable off Levophed gtt w/ MAP maintaining >60 per ICU recs. HR low but stable. Patient asymptomatic and without acute distress noted. Patient was c/o suprapubic catheter leakage and increased pelvic pain yesterday so urology re-consulted. Per Dr. Villalba, who is familiar w/ this patient, this is a chronic problem. Will likely step down patient out of ICU today.     1/13: Patient's BP remaining stable. Step-down orders placed yesterday but awaiting a bed. Patient states SPC "gushing" when she sat up to eat breakfast. I assessed it and noted some urine leakage but not a significant amount. Catheter balloon deflated and reinflated, patient tolerated well. Clear UOP in maedows catheter bag.      Interval History:  Reports some ongoing pelvic discomfort, but otherwise stable.  Testing for relative adrenal insufficiency yesterday was positive with cortisol levels less than 18 stim testing.  Discussed with patient    Review of Systems   Constitutional:  Negative for fatigue and fever.   HENT:  Negative for trouble swallowing.    Respiratory:  Negative for shortness of breath.    Cardiovascular:  " Positive for leg swelling. Negative for chest pain and palpitations.   Gastrointestinal:  Negative for abdominal pain.   Genitourinary:  Positive for pelvic pain.        Suprapubic catheter   Musculoskeletal:  Positive for back pain, gait problem and myalgias.   Neurological:  Positive for weakness. Negative for dizziness, light-headedness, numbness and headaches.   Psychiatric/Behavioral:  Negative for agitation and confusion. The patient is not nervous/anxious.    Objective:     Vital Signs (Most Recent):  Temp: 97.5 °F (36.4 °C) (01/17/23 0730)  Pulse: (!) 48 (01/17/23 0730)  Resp: 18 (01/17/23 0730)  BP: (!) 103/51 (01/17/23 0730)  SpO2: (!) 90 % (01/17/23 0730)   Vital Signs (24h Range):  Temp:  [97.4 °F (36.3 °C)-97.9 °F (36.6 °C)] 97.5 °F (36.4 °C)  Pulse:  [20-48] 48  Resp:  [14-20] 18  SpO2:  [90 %-96 %] 90 %  BP: ()/(47-56) 103/51     Weight: 101.4 kg (223 lb 8.7 oz)  Body mass index is 35.01 kg/m².    Intake/Output Summary (Last 24 hours) at 1/17/2023 0956  Last data filed at 1/17/2023 0800  Gross per 24 hour   Intake 120 ml   Output 1850 ml   Net -1730 ml        Physical Exam  Vitals and nursing note reviewed.   Constitutional:       General: She is not in acute distress.     Appearance: She is obese.   HENT:      Head: Normocephalic and atraumatic.   Eyes:      Pupils: Pupils are equal, round, and reactive to light.   Cardiovascular:      Rate and Rhythm: Regular rhythm. Bradycardia present.      Pulses:           Dorsalis pedis pulses are 1+ on the right side and 1+ on the left side.      Heart sounds: Normal heart sounds.   Pulmonary:      Effort: Pulmonary effort is normal. No respiratory distress.      Breath sounds: Decreased breath sounds present.   Abdominal:      General: Bowel sounds are normal. There is no distension.      Palpations: Abdomen is soft.      Tenderness: There is no abdominal tenderness.   Genitourinary:     Comments: Suprapubic catheter  Musculoskeletal:      Right lower  le+ Edema present.      Left lower le+ Edema present.   Skin:     General: Skin is warm.   Neurological:      Mental Status: She is alert and oriented to person, place, and time.      Motor: Weakness present.   Psychiatric:         Mood and Affect: Mood normal.         Behavior: Behavior normal.         Thought Content: Thought content normal.         Judgment: Judgment normal.       Significant Labs: All pertinent labs within the past 24 hours have been reviewed.    Significant Imaging: I have reviewed all pertinent imaging results/findings within the past 24 hours.      Assessment/Plan:      * UTI (urinary tract infection)  · ->UA/UC from  showed staph aureus, sensitive to oxacillin and bacterium  · ->repeat UA and UC from  - staph aureus  · ->s/p clindamycin in ED.   · ->s/p augmentin for a total 5-7 day course  · Changed to ancef   · UC  - staph schleiferi  · Patient had been changed to broad spectrum abx in the setting of possible sepsis 1/10  · Unlikely cause of bradycardia and hypotension  · Lactate NL, afebrile, WBC NL  · Changed back to ancef 1/10  · ID consult placed for recurrent UTIs - continue ancef and f/u w/ urology  · Transition to keflex 500 mg QID at discharge for total 14 day course (end date: 2023)      Adrenal insufficiency  · Possibly secondary due to opioid use   · Cosyntropin stimulation test performed yesterday without levels above 18  · Initiate on fludrocortisone and hydrocortisone   · Will refer to endocrinology as outpatient  · Obtain ACTH and Aldactone level today      Anxiety  · C/w fluoxetine dose      Debility  · PT/OT recommend SNF; awaiting placement    Hypotension  · BP /40-59 over last 24 hours  · S/p 1.5L total NS bolus and pressors  · Goal MAP >60 - maintaining off pressors  · BP reading appears to be highly differential depending on arm positioning  · Lasix o/h   · Given this and setting of CHF, do not want to overload w/ IVF  · May resume if  "BP can tolerate  · Patient moved to ICU 1/10 for Levophed gtt  · BP stable and weaned off 1/11  · Breakthrough pain meds stopped  · Likely etiology intrathecal dilaudid pump vs UTI sepsis (unlikely) vs hypothyroid (also unlikely) vs polypharmacy  · Attempted to call Dr. Hillman (patient's pain medicine provider) regarding possibly decreasing dilaudid pump dose, no answer so voicemail left 1/11 and 1/12  · Patient will likely have to f/u outpatient  · Concern that patient has relative adrenal insufficiency due to opioid use; am cortisol and stim test c/w adrenal insufficiency (see below)        Constipation due to opioid therapy  · C/w aggressive bowel regimen       Chronic diastolic heart failure  · Lasix currently o/h in the setting of hypotension  · Dose given 1/10 - good UOP  · May resume if BP tolerates  · Echo performed - see report  · Monitor  · 1.8L fluid restriction      Suprapubic catheter  · Recently exchanged. Exchanged every 3 days  · ED provider repositioned it this admission  · Continue to monitor urine output  · Urology consulted to replace 1/9 - now clear yellow UOP  · 1/11 c/o increased pelvic pain and leakage around SPC  · Urology re-consulted but patient known to Dr. Villalba - states this is a chronic problem and patient can f/u outpatient  · 1/13 patient c/o "gushing" around SPC - upon assessment some leakage noted but not a significant amount  · Deflated and reinflated balloon with 30cc sterile water while maintaining aseptic technique. Catheter not readvanced or repositioned. Patient tolerated well. Clear urine draining well.         Bradycardia  · Low but stable, patient asymptomatic    · HR as low as 39 bpm 1/10 - patient asymptomatic except for fatigue but became more lethargic as the day progressed   · Patient moved to ICU for pressors and HR dropped as low as 20s  · Patient back up into 30s-40s the morning of 1/11 and patient asymptomatic  · Patient maintaining in 40s  · Cardiology following " "- see recs  · Echo performed- see report  · Maintain telemetry      Primary hypothyroidism  · TSH 51.871  · T4 0.75  · Synthroid increased to 150 mg/d      Coronary artery disease of native artery with stable angina pectoris  · History of CAD with LAD with R-L collaterals  · Continue statin and ASA  · No hematuria present so will continue ASA  · Patient having off and on CP  · Trops negative and EKG shows no ischemic changes  · Cardiology following - see recs  · recommend medical management although limited given hematuria; ASA when cleared by Urology; recommend statin but no BB given baseline bradycardia; could use Imdur starting at 30 mg but will hold off given marginal BP and pressor use; currently chest pain free   · echo 1/10 with EF 50-55%; no need for further cardiac workup at this time; recommend cardiology follow up as an outpatient         Narcotic dependency, continuous  · Currently on home pump infusion          Iron deficiency anemia  · H/H stable  · Start daily iron      Sleep apnea  · Patient and daughter state she was never "officially diagnosed" w/ CECI and she doesn't wear CPAP  · ABG on RA: pO2 57; pCO2 53.9; pH 7.397;  HCO3 33.2, %O2 Sat 88  · Needs outpatient NIF PFT, MIP/MEP, and sleep study  · Continue w/ CPAP w/ sleep.        VTE Risk Mitigation (From admission, onward)         Ordered     Place MALLORIE hose  Until discontinued         01/10/23 1309     enoxaparin injection 40 mg  Daily         01/09/23 0107     IP VTE HIGH RISK PATIENT  Once         01/09/23 0107     Place sequential compression device  Until discontinued         01/09/23 0107                Discharge Planning   JACKIE:      Code Status: Full Code   Is the patient medically ready for discharge?:     Reason for patient still in hospital (select all that apply): Pending disposition  Discharge Plan A: Skilled Nursing Facility   Discharge Delays: None known at this time              Nic Mistry MD  Department of Hospital " [facility]

## 2023-01-17 NOTE — SUBJECTIVE & OBJECTIVE
Interval History:  Reports some ongoing pelvic discomfort, but otherwise stable.  Testing for relative adrenal insufficiency yesterday was positive with cortisol levels less than 18 stim testing.  Discussed with patient    Review of Systems   Constitutional:  Negative for fatigue and fever.   HENT:  Negative for trouble swallowing.    Respiratory:  Negative for shortness of breath.    Cardiovascular:  Positive for leg swelling. Negative for chest pain and palpitations.   Gastrointestinal:  Negative for abdominal pain.   Genitourinary:  Positive for pelvic pain.        Suprapubic catheter   Musculoskeletal:  Positive for back pain, gait problem and myalgias.   Neurological:  Positive for weakness. Negative for dizziness, light-headedness, numbness and headaches.   Psychiatric/Behavioral:  Negative for agitation and confusion. The patient is not nervous/anxious.    Objective:     Vital Signs (Most Recent):  Temp: 97.5 °F (36.4 °C) (01/17/23 0730)  Pulse: (!) 48 (01/17/23 0730)  Resp: 18 (01/17/23 0730)  BP: (!) 103/51 (01/17/23 0730)  SpO2: (!) 90 % (01/17/23 0730)   Vital Signs (24h Range):  Temp:  [97.4 °F (36.3 °C)-97.9 °F (36.6 °C)] 97.5 °F (36.4 °C)  Pulse:  [20-48] 48  Resp:  [14-20] 18  SpO2:  [90 %-96 %] 90 %  BP: ()/(47-56) 103/51     Weight: 101.4 kg (223 lb 8.7 oz)  Body mass index is 35.01 kg/m².    Intake/Output Summary (Last 24 hours) at 1/17/2023 0956  Last data filed at 1/17/2023 0800  Gross per 24 hour   Intake 120 ml   Output 1850 ml   Net -1730 ml        Physical Exam  Vitals and nursing note reviewed.   Constitutional:       General: She is not in acute distress.     Appearance: She is obese.   HENT:      Head: Normocephalic and atraumatic.   Eyes:      Pupils: Pupils are equal, round, and reactive to light.   Cardiovascular:      Rate and Rhythm: Regular rhythm. Bradycardia present.      Pulses:           Dorsalis pedis pulses are 1+ on the right side and 1+ on the left side.      Heart  sounds: Normal heart sounds.   Pulmonary:      Effort: Pulmonary effort is normal. No respiratory distress.      Breath sounds: Decreased breath sounds present.   Abdominal:      General: Bowel sounds are normal. There is no distension.      Palpations: Abdomen is soft.      Tenderness: There is no abdominal tenderness.   Genitourinary:     Comments: Suprapubic catheter  Musculoskeletal:      Right lower le+ Edema present.      Left lower le+ Edema present.   Skin:     General: Skin is warm.   Neurological:      Mental Status: She is alert and oriented to person, place, and time.      Motor: Weakness present.   Psychiatric:         Mood and Affect: Mood normal.         Behavior: Behavior normal.         Thought Content: Thought content normal.         Judgment: Judgment normal.       Significant Labs: All pertinent labs within the past 24 hours have been reviewed.    Significant Imaging: I have reviewed all pertinent imaging results/findings within the past 24 hours.

## 2023-01-18 ENCOUNTER — PATIENT OUTREACH (OUTPATIENT)
Dept: ADMINISTRATIVE | Facility: OTHER | Age: 68
End: 2023-01-18
Payer: MEDICARE

## 2023-01-18 LAB
ANION GAP SERPL CALC-SCNC: 8 MMOL/L (ref 8–16)
BUN SERPL-MCNC: 9 MG/DL (ref 8–23)
CALCIUM SERPL-MCNC: 8.9 MG/DL (ref 8.7–10.5)
CHLORIDE SERPL-SCNC: 101 MMOL/L (ref 95–110)
CO2 SERPL-SCNC: 27 MMOL/L (ref 23–29)
CREAT SERPL-MCNC: 0.9 MG/DL (ref 0.5–1.4)
EST. GFR  (NO RACE VARIABLE): >60 ML/MIN/1.73 M^2
GLUCOSE SERPL-MCNC: 102 MG/DL (ref 70–110)
POTASSIUM SERPL-SCNC: 4.2 MMOL/L (ref 3.5–5.1)
SODIUM SERPL-SCNC: 136 MMOL/L (ref 136–145)

## 2023-01-18 PROCEDURE — 63600175 PHARM REV CODE 636 W HCPCS: Mod: HCNC | Performed by: STUDENT IN AN ORGANIZED HEALTH CARE EDUCATION/TRAINING PROGRAM

## 2023-01-18 PROCEDURE — 94660 CPAP INITIATION&MGMT: CPT | Mod: HCNC

## 2023-01-18 PROCEDURE — 94761 N-INVAS EAR/PLS OXIMETRY MLT: CPT | Mod: HCNC

## 2023-01-18 PROCEDURE — 63600175 PHARM REV CODE 636 W HCPCS: Mod: HCNC | Performed by: NURSE PRACTITIONER

## 2023-01-18 PROCEDURE — 99900035 HC TECH TIME PER 15 MIN (STAT): Mod: HCNC

## 2023-01-18 PROCEDURE — 36415 COLL VENOUS BLD VENIPUNCTURE: CPT | Mod: HCNC | Performed by: HOSPITALIST

## 2023-01-18 PROCEDURE — 25000003 PHARM REV CODE 250: Mod: HCNC | Performed by: HOSPITALIST

## 2023-01-18 PROCEDURE — 80048 BASIC METABOLIC PNL TOTAL CA: CPT | Mod: HCNC | Performed by: HOSPITALIST

## 2023-01-18 PROCEDURE — 21400001 HC TELEMETRY ROOM: Mod: HCNC

## 2023-01-18 PROCEDURE — 97110 THERAPEUTIC EXERCISES: CPT | Mod: HCNC,CQ

## 2023-01-18 PROCEDURE — 97110 THERAPEUTIC EXERCISES: CPT | Mod: HCNC,CO

## 2023-01-18 PROCEDURE — 25000003 PHARM REV CODE 250: Mod: HCNC | Performed by: STUDENT IN AN ORGANIZED HEALTH CARE EDUCATION/TRAINING PROGRAM

## 2023-01-18 PROCEDURE — 97535 SELF CARE MNGMENT TRAINING: CPT | Mod: HCNC,CO

## 2023-01-18 PROCEDURE — 25000003 PHARM REV CODE 250: Mod: HCNC | Performed by: NURSE PRACTITIONER

## 2023-01-18 PROCEDURE — 97116 GAIT TRAINING THERAPY: CPT | Mod: HCNC,CQ

## 2023-01-18 PROCEDURE — 27000221 HC OXYGEN, UP TO 24 HOURS: Mod: HCNC

## 2023-01-18 PROCEDURE — 25000003 PHARM REV CODE 250: Mod: HCNC | Performed by: INTERNAL MEDICINE

## 2023-01-18 RX ADMIN — CEFAZOLIN SODIUM 2 G: 2 SOLUTION INTRAVENOUS at 06:01

## 2023-01-18 RX ADMIN — CEFAZOLIN SODIUM 2 G: 2 SOLUTION INTRAVENOUS at 09:01

## 2023-01-18 RX ADMIN — DOCUSATE SODIUM - SENNOSIDES 1 TABLET: 50; 8.6 TABLET, FILM COATED ORAL at 09:01

## 2023-01-18 RX ADMIN — FERROUS SULFATE TAB 325 MG (65 MG ELEMENTAL FE) 1 EACH: 325 (65 FE) TAB at 08:01

## 2023-01-18 RX ADMIN — DOCUSATE SODIUM - SENNOSIDES 1 TABLET: 50; 8.6 TABLET, FILM COATED ORAL at 08:01

## 2023-01-18 RX ADMIN — LIDOCAINE 1 PATCH: 50 PATCH CUTANEOUS at 03:01

## 2023-01-18 RX ADMIN — TRAZODONE HYDROCHLORIDE 300 MG: 100 TABLET ORAL at 09:01

## 2023-01-18 RX ADMIN — PANTOPRAZOLE SODIUM 40 MG: 40 TABLET, DELAYED RELEASE ORAL at 08:01

## 2023-01-18 RX ADMIN — ATORVASTATIN CALCIUM 80 MG: 40 TABLET, FILM COATED ORAL at 08:01

## 2023-01-18 RX ADMIN — FLUDROCORTISONE ACETATE 100 MCG: 0.1 TABLET ORAL at 08:01

## 2023-01-18 RX ADMIN — OXYBUTYNIN CHLORIDE 5 MG: 5 TABLET, EXTENDED RELEASE ORAL at 08:01

## 2023-01-18 RX ADMIN — HYDROCORTISONE 10 MG: 5 TABLET ORAL at 08:01

## 2023-01-18 RX ADMIN — HYDROCODONE BITARTRATE AND ACETAMINOPHEN 1 TABLET: 5; 325 TABLET ORAL at 09:01

## 2023-01-18 RX ADMIN — ENOXAPARIN SODIUM 40 MG: 40 INJECTION SUBCUTANEOUS at 05:01

## 2023-01-18 RX ADMIN — ASPIRIN 81 MG: 81 TABLET, COATED ORAL at 08:01

## 2023-01-18 RX ADMIN — FLUOXETINE 60 MG: 20 CAPSULE ORAL at 08:01

## 2023-01-18 RX ADMIN — CEFAZOLIN SODIUM 2 G: 2 SOLUTION INTRAVENOUS at 01:01

## 2023-01-18 RX ADMIN — HYDROCORTISONE 5 MG: 5 TABLET ORAL at 09:01

## 2023-01-18 NOTE — PT/OT/SLP PROGRESS
Physical Therapy Treatment    Patient Name:  Tasha Hawley   MRN:  [id number]    Recommendations:     Discharge Recommendations: nursing facility, skilled  Discharge Equipment Recommendations:  (TBD)  Barriers to discharge:  decreased mobility,strength and balance    Assessment:     Tasha Hawley is a 67 y.o. female admitted with a medical diagnosis of UTI (urinary tract infection).  She presents with the following impairments/functional limitations: weakness, impaired endurance, impaired functional mobility, gait instability, impaired balance, decreased lower extremity function, decreased ROM, impaired coordination, impaired skin,pt with good participation and ability to participate today,pt requires assistance with all mobility and will benefit from SNF upon discharge.    Rehab Prognosis: Good; patient would benefit from acute skilled PT services to address these deficits and reach maximum level of function.    Recent Surgery: * No surgery found *      Plan:     During this hospitalization, patient to be seen 5 x/week to address the identified rehab impairments via gait training, therapeutic activities, therapeutic exercises, neuromuscular re-education and progress toward the following goals:    Plan of Care Expires:  02/09/23    Subjective     Chief Complaint: n/a  Patient/Family Comments/goals: pt is ready to walk some today.  Pain/Comfort:  Pain Rating 1: 0/10      Objective:     Communicated with nsg prior to session.  Patient found supine with meadows catheter, peripheral IV upon PT entry to room.     General Precautions: Standard, fall, hearing impaired  Orthopedic Precautions: N/A  Braces: N/A  Respiratory Status: Room air     Functional Mobility:  Bed Mobility:     Supine to Sit: stand by assistance  Transfers:     Sit to Stand:  stand by assistance and contact guard assistance with rolling walker  Bed to Chair: contact guard assistance with  rolling walker  using  ambulation  Gait: amb ~40' with RW  and SBA/CGA and kyphotic posture  Balance: fair standing balance with RW      AM-PAC 6 CLICK MOBILITY  Turning over in bed (including adjusting bedclothes, sheets and blankets)?: 3  Sitting down on and standing up from a chair with arms (e.g., wheelchair, bedside commode, etc.): 3  Moving from lying on back to sitting on the side of the bed?: 3  Moving to and from a bed to a chair (including a wheelchair)?: 3  Need to walk in hospital room?: 3  Climbing 3-5 steps with a railing?: 2  Basic Mobility Total Score: 17       Treatment & Education: le seated ex's X 10-12 reps inc: ap,laq,hip flex,set up for pt to brush her teeth      Patient left up in chair with all lines intact, call button in reach, nsg notified, and MD present..    GOALS: see general POC  Multidisciplinary Problems       Physical Therapy Goals          Problem: Physical Therapy    Goal Priority Disciplines Outcome Goal Variances Interventions   Physical Therapy Goal     PT, PT/OT Ongoing, Progressing     Description: Goals to be met by: 23     Patient will increase functional independence with mobility by performin. Supine to sit with Stand-by Assistance  2. Sit to supine with Stand-by Assistance  3. Sit to stand transfer with Stand-by Assistance  4. Bed to chair transfer with Stand-by Assistance using Rolling Walker  5. Gait  x 50 feet with Stand-by Assistance using Rolling Walker.                          Time Tracking:     PT Received On: 23  PT Start Time: 902     PT Stop Time: 927  PT Total Time (min): 25 min     Billable Minutes: Gait Training 15 and Therapeutic Exercise 10    Treatment Type: Treatment  PT/PTA: PTA     PTA Visit Number: 4     2023

## 2023-01-18 NOTE — PLAN OF CARE
GERSON sent updated information via [employer] to [facility] Phone: [phone]. GERSON will follow.     GIO Mckinley  [phone]     01/18/23 0953   Post-Acute Status   Post-Acute Authorization Placement   Discharge Plan   Discharge Plan A Skilled Nursing Facility

## 2023-01-18 NOTE — PLAN OF CARE
Problem: Adult Inpatient Plan of Care  Goal: Plan of Care Review  Outcome: Ongoing, Progressing  Chart check complete. Vitals, orders, labs, and progress notes reviewed. Care plan updated. Will monitor.

## 2023-01-18 NOTE — PROGRESS NOTES
[facility] Medicine  Progress Note    Patient Name: Tasha Hawley  MRN: [id number]  Patient Class: IP- Inpatient   Admission Date: 1/8/2023  Length of Stay: 8 days  Attending Physician: Nic Mistry, *  Primary Care Provider: Mesfin Hodges Ii, MD        Subjective:     Principal Problem:UTI (urinary tract infection)        HPI:  Tasha Villalpando is a 67yr female with PMH Neurogenic bladder s/p suprapubic placement, CHF, hypothyroidism who presents with decreased urine output from catheter and hematuria.    Pt states that she has had pain from suprapubic catheter, with decreased urine output and hematuria. Catheter is changed every three days, per patient. She states she recently had an oupatient urine cultures positive for staph, but pending susceptibilities. She reports allergies to vancomycin and bactrium.    In the ED, initial triage vitals were significant for slightly low Bps. CBC showed microcytic anemia. BMP showed hyponatremia and hypokalemia. UA showed several WBC and RBC with moderate bacteria. UC from 01/06 resulted showing oxacillin and bactrium sensitive staph aureus. BC and UC from 01/08 are pending.    While in the ED, pt complained of lower abdominal pain and decreased output, with apparent clot noted on flushing. With manipulation, provider repositioned catheter, quickly obtaining 300cc of output shortly afterwards. Pt  was started on clindamycin for UTI.    Medicine accepted pt for UTI.      Overview/Hospital Course:  1/10: Patient has been running a consistently low BP and HR over the last 24 hours. She is fatigued but otherwise asymptomatic. She received a 1x bolus of 500 ml NS and a 1x bolus of 1000 ml NS. Per patient and her , BP is regularly low but not quite this low. However, she did just have her intrathecal dilaudid pump increased so that may be the culprit. Lasix is currently o/h and in the setting of CHF do not want to give anymore IVF at this time.  "Also do not want to give midodrine at this time given bradycardia. Cardiology consulted regarding bradycardia as low as 40 bpm. Appreciate their recommendations.    1/11: Patient moved to 1/10 to start pressors and for increased monitoring d/t increasingly decreased HR and bradycardia partnered with increased lethargy (falling asleep while eating and talking). Patient seen during ICU rounding and BP significantly improved while weaning off Levophed gtt. HR still low but patient awake, alert, and participating in conversation appropriately. Attempted to contact patient's outpatient provider who manages her intrathecal dilaudid pump about possibly decreasing dose, as this is thought to possibly be a cause of patient's hypotension and bradycardia. Left voicemail. Will continue weaning off levophed gtt.    1/12: Patient's BP low but stable off Levophed gtt w/ MAP maintaining >60 per ICU recs. HR low but stable. Patient asymptomatic and without acute distress noted. Patient was c/o suprapubic catheter leakage and increased pelvic pain yesterday so urology re-consulted. Per Dr. Villalba, who is familiar w/ this patient, this is a chronic problem. Will likely step down patient out of ICU today.     1/13: Patient's BP remaining stable. Step-down orders placed yesterday but awaiting a bed. Patient states SPC "gushing" when she sat up to eat breakfast. I assessed it and noted some urine leakage but not a significant amount. Catheter balloon deflated and reinflated, patient tolerated well. Clear UOP in [facility] catheter bag.      Interval History:  Doing okay today, still with borderline low blood pressures and some dizziness.  Does state that she was able to ambulate with a walker with PT/OT today.  Back feeling a little better as she is sitting up in a chair this afternoon.    Review of Systems   Constitutional:  Negative for fatigue and fever.   HENT:  Negative for trouble swallowing.    Respiratory:  Negative for shortness of " breath.    Cardiovascular:  Positive for leg swelling. Negative for chest pain and palpitations.   Gastrointestinal:  Negative for abdominal pain.   Genitourinary:  Positive for pelvic pain.        Suprapubic catheter   Musculoskeletal:  Positive for back pain, gait problem and myalgias.   Neurological:  Positive for weakness. Negative for dizziness, light-headedness, numbness and headaches.   Psychiatric/Behavioral:  Negative for agitation and confusion. The patient is not nervous/anxious.    Objective:     Vital Signs (Most Recent):  Temp: 96.1 °F (35.6 °C) (01/18/23 1528)  Pulse: (!) 43 (01/18/23 1528)  Resp: 18 (01/18/23 1528)  BP: (!) 98/53 (01/18/23 1528)  SpO2: (!) 94 % (01/18/23 1528)   Vital Signs (24h Range):  Temp:  [96.1 °F (35.6 °C)-98.2 °F (36.8 °C)] 96.1 °F (35.6 °C)  Pulse:  [41-55] 43  Resp:  [17-19] 18  SpO2:  [93 %-99 %] 94 %  BP: ()/(46-53) 98/53     Weight: 103.2 kg (227 lb 8.2 oz)  Body mass index is 35.63 kg/m².    Intake/Output Summary (Last 24 hours) at 1/18/2023 1712  Last data filed at 1/18/2023 1200  Gross per 24 hour   Intake 361 ml   Output 2350 ml   Net -1989 ml        Physical Exam  Vitals and nursing note reviewed.   Constitutional:       General: She is not in acute distress.     Appearance: She is obese.   HENT:      Head: Normocephalic and atraumatic.   Eyes:      Pupils: Pupils are equal, round, and reactive to light.   Cardiovascular:      Rate and Rhythm: Regular rhythm. Bradycardia present.      Pulses:           Dorsalis pedis pulses are 1+ on the right side and 1+ on the left side.      Heart sounds: Normal heart sounds.   Pulmonary:      Effort: Pulmonary effort is normal. No respiratory distress.      Breath sounds: Decreased breath sounds present.   Abdominal:      General: Bowel sounds are normal. There is no distension.      Palpations: Abdomen is soft.      Tenderness: There is no abdominal tenderness.   Genitourinary:     Comments: Suprapubic  catheter  Musculoskeletal:      Right lower le+ Edema present.      Left lower le+ Edema present.   Skin:     General: Skin is warm.   Neurological:      Mental Status: She is alert and oriented to person, place, and time.      Motor: Weakness present.   Psychiatric:         Mood and Affect: Mood normal.         Behavior: Behavior normal.         Thought Content: Thought content normal.         Judgment: Judgment normal.       Significant Labs: All pertinent labs within the past 24 hours have been reviewed.    Significant Imaging: I have reviewed all pertinent imaging results/findings within the past 24 hours.      Assessment/Plan:      * UTI (urinary tract infection)  · ->UA/UC from  showed staph aureus, sensitive to oxacillin and bacterium  · ->repeat UA and UC from  - staph aureus  · ->s/p clindamycin in ED.   · ->s/p augmentin for a total 5-7 day course  · Changed to ancef   · UC  - staph schleiferi  · Patient had been changed to broad spectrum abx in the setting of possible sepsis 1/10  · Unlikely cause of bradycardia and hypotension  · Lactate NL, afebrile, WBC NL  · Changed back to ancef 1/10  · ID consult placed for recurrent UTIs - continue ancef and f/u w/ urology  · Transition to keflex 500 mg QID at discharge for total 14 day course (end date: 2023)      Adrenal insufficiency  · Possibly secondary due to opioid use   · Cosyntropin stimulation test performed yesterday without levels above 18  · Initiate on fludrocortisone and hydrocortisone   · Will refer to endocrinology as outpatient  · Obtain ACTH and Aldactone level, pending      Anxiety  · C/w fluoxetine dose      Debility  · PT/OT recommend SNF; awaiting placement    Hypotension  · BP /40-59 over last 24 hours  · S/p 1.5L total NS bolus and pressors  · Goal MAP >60 - maintaining off pressors  · BP reading appears to be highly differential depending on arm positioning  · Lasix o/h   · Given this and setting of CHF,  "do not want to overload w/ IVF  · May resume if BP can tolerate  · Patient moved to ICU 1/10 for Levophed gtt  · BP stable and weaned off 1/11  · Breakthrough pain meds stopped  · Likely etiology intrathecal dilaudid pump vs UTI sepsis (unlikely) vs hypothyroid (also unlikely) vs polypharmacy  · Attempted to call Dr. Hillman (patient's pain medicine provider) regarding possibly decreasing dilaudid pump dose, no answer so voicemail left 1/11 and 1/12  · Patient will likely have to f/u outpatient  · Concern that patient has relative adrenal insufficiency due to opioid use; am cortisol and stim test c/w adrenal insufficiency (see below)        Constipation due to opioid therapy  · C/w aggressive bowel regimen       Chronic diastolic heart failure  · Lasix currently o/h in the setting of hypotension  · Dose given 1/10 - good UOP  · May resume if BP tolerates  · Echo performed - see report  · Monitor  · 1.8L fluid restriction      Suprapubic catheter  · Recently exchanged. Exchanged every 3 days  · ED provider repositioned it this admission  · Continue to monitor urine output  · Urology consulted to replace 1/9 - now clear yellow UOP  · 1/11 c/o increased pelvic pain and leakage around SPC  · Urology re-consulted but patient known to Dr. Villalba - states this is a chronic problem and patient can f/u outpatient  · 1/13 patient c/o "gushing" around SPC - upon assessment some leakage noted but not a significant amount  · Deflated and reinflated balloon with 30cc sterile water while maintaining aseptic technique. Catheter not readvanced or repositioned. Patient tolerated well. Clear urine draining well.         Bradycardia  · Low but stable, patient asymptomatic    · HR as low as 39 bpm 1/10 - patient asymptomatic except for fatigue but became more lethargic as the day progressed   · Patient moved to ICU for pressors and HR dropped as low as 20s  · Patient back up into 30s-40s the morning of 1/11 and patient " "asymptomatic  · Patient maintaining in 40s  · Cardiology following - see recs  · Echo performed- see report  · Maintain telemetry      Primary hypothyroidism  · TSH 51.871  · T4 0.75  · Synthroid increased to 150 mg/d      Coronary artery disease of native artery with stable angina pectoris  · History of CAD with LAD with R-L collaterals  · Continue statin and ASA  · No hematuria present so will continue ASA  · Patient having off and on CP  · Trops negative and EKG shows no ischemic changes  · Cardiology following - see recs  · recommend medical management although limited given hematuria; ASA when cleared by Urology; recommend statin but no BB given baseline bradycardia; could use Imdur starting at 30 mg but will hold off given marginal BP and pressor use; currently chest pain free   · echo 1/10 with EF 50-55%; no need for further cardiac workup at this time; recommend cardiology follow up as an outpatient         Narcotic dependency, continuous  · Currently on home pump infusion          Iron deficiency anemia  · H/H stable  · Start daily iron      Sleep apnea  · Patient and daughter state she was never "officially diagnosed" w/ CECI and she doesn't wear CPAP  · ABG on RA: pO2 57; pCO2 53.9; pH 7.397;  HCO3 33.2, %O2 Sat 88  · Needs outpatient NIF PFT, MIP/MEP, and sleep study  · Continue w/ CPAP w/ sleep.        VTE Risk Mitigation (From admission, onward)         Ordered     Place MALLORIE hose  Until discontinued         01/10/23 1309     enoxaparin injection 40 mg  Daily         01/09/23 0107     IP VTE HIGH RISK PATIENT  Once         01/09/23 0107     Place sequential compression device  Until discontinued         01/09/23 0107                Discharge Planning   JACKIE: 1/18/2023     Code Status: Full Code   Is the patient medically ready for discharge?:     Reason for patient still in hospital (select all that apply): Pending disposition  Discharge Plan A: Skilled Nursing Facility   Discharge Delays: None known at " this time              Nic Mistry MD  Department of Hospital Medicine   [facility]

## 2023-01-18 NOTE — PLAN OF CARE
Problem: Occupational Therapy  Goal: Occupational Therapy Goal  Description: Goals to be met by: 02/09/2023      Patient will increase functional independence with ADLs by performing:    UE Dressing with Supervision.  LE Dressing with Minimal Assistance.  Grooming while standing with Stand-by Assistance.  Toileting from bedside commode or toilet with Contact Guard Assistance for hygiene and clothing management.   Toilet transfer to bedside commode or toilet with Contact Guard Assistance.  Increased functional strength to [facility] for self care.  Upper extremity exercise program x10 reps per handout, with independence.     Outcome: Ongoing, Progressing   Tasha Hawley is a 67 y.o. female with a medical diagnosis of UTI (urinary tract infection).  Performance deficits affecting function are weakness, impaired endurance, impaired self care skills, impaired functional mobility, gait instability, impaired balance, decreased upper extremity function, decreased lower extremity function, decreased coordination, pain, decreased ROM, impaired skin. Pt found in chair, agreeable to therapy.  Pt with fair tolerance of therapy. She continues with decreased overall strength and endurance limiting tolerance of therapy.  Continue OT services to address functional goals, progressing as able.

## 2023-01-18 NOTE — PLAN OF CARE
HARJEET spoke with Cat at [facility] Phone: [phone], she stated she needed updated number for pt's family. Per Cat she thinks she has most of the things she will need to admit pt. Cat will call harjeet back in about 30 minutes.     GIO Mckinley  [phone]     01/18/23 1331   Post-Acute Status   Post-Acute Authorization Placement   Discharge Plan   Discharge Plan A Skilled Nursing Facility

## 2023-01-18 NOTE — PLAN OF CARE
AAO X4. Regular diet continued. Pt denies N/V, SOB, distress, and pain. Sinus Ilya on tele. Medications given per MAR. Vital signs stable throughout the night. Safety precautions maintained. CPAP on through the night. Bed alarm set. Call bell within reach. Patient encouraged to call for assistance.

## 2023-01-18 NOTE — SUBJECTIVE & OBJECTIVE
Interval History:  Doing okay today, still with borderline low blood pressures and some dizziness.  Does state that she was able to ambulate with a walker with PT/OT today.  Back feeling a little better as she is sitting up in a chair this afternoon.    Review of Systems   Constitutional:  Negative for fatigue and fever.   HENT:  Negative for trouble swallowing.    Respiratory:  Negative for shortness of breath.    Cardiovascular:  Positive for leg swelling. Negative for chest pain and palpitations.   Gastrointestinal:  Negative for abdominal pain.   Genitourinary:  Positive for pelvic pain.        Suprapubic catheter   Musculoskeletal:  Positive for back pain, gait problem and myalgias.   Neurological:  Positive for weakness. Negative for dizziness, light-headedness, numbness and headaches.   Psychiatric/Behavioral:  Negative for agitation and confusion. The patient is not nervous/anxious.    Objective:     Vital Signs (Most Recent):  Temp: 96.1 °F (35.6 °C) (01/18/23 1528)  Pulse: (!) 43 (01/18/23 1528)  Resp: 18 (01/18/23 1528)  BP: (!) 98/53 (01/18/23 1528)  SpO2: (!) 94 % (01/18/23 1528)   Vital Signs (24h Range):  Temp:  [96.1 °F (35.6 °C)-98.2 °F (36.8 °C)] 96.1 °F (35.6 °C)  Pulse:  [41-55] 43  Resp:  [17-19] 18  SpO2:  [93 %-99 %] 94 %  BP: ()/(46-53) 98/53     Weight: 103.2 kg (227 lb 8.2 oz)  Body mass index is 35.63 kg/m².    Intake/Output Summary (Last 24 hours) at 1/18/2023 1712  Last data filed at 1/18/2023 1200  Gross per 24 hour   Intake 361 ml   Output 2350 ml   Net -1989 ml        Physical Exam  Vitals and nursing note reviewed.   Constitutional:       General: She is not in acute distress.     Appearance: She is obese.   HENT:      Head: Normocephalic and atraumatic.   Eyes:      Pupils: Pupils are equal, round, and reactive to light.   Cardiovascular:      Rate and Rhythm: Regular rhythm. Bradycardia present.      Pulses:           Dorsalis pedis pulses are 1+ on the right side and 1+ on  the left side.      Heart sounds: Normal heart sounds.   Pulmonary:      Effort: Pulmonary effort is normal. No respiratory distress.      Breath sounds: Decreased breath sounds present.   Abdominal:      General: Bowel sounds are normal. There is no distension.      Palpations: Abdomen is soft.      Tenderness: There is no abdominal tenderness.   Genitourinary:     Comments: Suprapubic catheter  Musculoskeletal:      Right lower le+ Edema present.      Left lower le+ Edema present.   Skin:     General: Skin is warm.   Neurological:      Mental Status: She is alert and oriented to person, place, and time.      Motor: Weakness present.   Psychiatric:         Mood and Affect: Mood normal.         Behavior: Behavior normal.         Thought Content: Thought content normal.         Judgment: Judgment normal.       Significant Labs: All pertinent labs within the past 24 hours have been reviewed.    Significant Imaging: I have reviewed all pertinent imaging results/findings within the past 24 hours.

## 2023-01-18 NOTE — PLAN OF CARE
GERSON met with pt at bedside to discuss d/c planning. GERSON informed pt that [facility] has been trying to contact her family to complete admission paperwork. GERSON spoke with Cat with [facility] to update the call list of family members. GERSON will follow.     GIO Mckinley  [phone]     01/18/23 1518   Post-Acute Status   Post-Acute Authorization Placement   Discharge Plan   Discharge Plan A Skilled Nursing Facility

## 2023-01-18 NOTE — PROGRESS NOTES
IP Liaison - Initial Visit Note    Patient: Tasha Hawley  MRN:  [id number]  Date of Service:  1/18/2023  Completed by:  ML Weir    Reason for Visit   Patient presents with    IP Liaison Initial Visit       RSW met with patient at bedside in order to complete SDOH questionnaire and liaison assessment.  Pt has identified no immediate social barriers to care.  Pt spouse is pt main support system and assists pt with social needs such as transportation. Pt also has supportive friends who sporadically check in on pt. Pt declined the need for resources at this time.    The following were addressed during this visit:  - Review SDOH Questions   - Complete patient assessment   - Review and discuss options to address the patient's transportation needs   - Review and discuss options for reducing daily stress   - Complete initial visit with patient        Patient Summary     IP Liaison Patient Assessment    General  Level of Caregiver support: Member independent and does not need caregiver assistance  Have you had to make a decision between paying for any of the following in the last 2 months?: None  Transportation means: Family  Employment status: Disabled  Assessments  Was the PHQ Depression Screening completed this visit?: No  Was the GONZALO-7 Screening completed this visit?: No       ML Weir

## 2023-01-18 NOTE — ASSESSMENT & PLAN NOTE
· Possibly secondary due to opioid use   · Cosyntropin stimulation test performed yesterday without levels above 18  · Initiate on fludrocortisone and hydrocortisone   · Will refer to endocrinology as outpatient  · Obtain ACTH and Aldactone level, pending

## 2023-01-18 NOTE — PT/OT/SLP PROGRESS
Occupational Therapy   Treatment    Name: Tasha Hawley  MRN: [id number]  Admitting Diagnosis:  UTI (urinary tract infection)       Recommendations:     Discharge Recommendations: nursing facility, skilled  Discharge Equipment Recommendations:  other (see comments) (defer to SNF)  Barriers to discharge:  Other (Comment) (Increased level of assistance)    Assessment:     Tasha Hawley is a 67 y.o. female with a medical diagnosis of UTI (urinary tract infection).  Performance deficits affecting function are weakness, impaired endurance, impaired self care skills, impaired functional mobility, gait instability, impaired balance, decreased upper extremity function, decreased lower extremity function, decreased coordination, pain, decreased ROM, impaired skin. Pt found in chair, agreeable to therapy.  Pt with fair tolerance of therapy. She continues with decreased overall strength and endurance limiting tolerance of therapy.  Continue OT services to address functional goals, progressing as able.      Rehab Prognosis:  Good; patient would benefit from acute skilled OT services to address these deficits and reach maximum level of function.       Plan:     Patient to be seen 3 x/week to address the above listed problems via self-care/home management, therapeutic activities, therapeutic exercises  Plan of Care Expires: 02/09/23  Plan of Care Reviewed with: patient    Subjective     Pain/Comfort:  Pain Rating 1:  (low back-did not rate)    Objective:     Communicated with: RN prior to session.  Patient found up in chair with peripheral IV, telemetry upon OT entry to room.    General Precautions: Standard, fall, hearing impaired    Orthopedic Precautions:N/A  Braces: N/A  Respiratory Status: Room air     Occupational Performance:      Functional Mobility/Transfers:  Declined    Activities of Daily Living:  Grooming: modified independence chair level  Lower Body Dressing: maximal assistance Pt able to cross only her RLE  into figure 4 position  however unable to reach forward to feet 2/2 low back pain.  Educated pt on AE(reacher, sock aid) for LB drsg.  Pt reports owning a sock aid.  She also reports her  assists with dressing as needed.        [facility] 6 Click ADL: 15    Treatment & Education:  Pt performed BUE 2# dowel exercises 2 x 10 reps for shld flex, chest press and bicep curls and AROM for finger pumps.      Patient left up in chair with all lines intact, call button in reach, chair alarm on, and RN notified    GOALS:   Multidisciplinary Problems       Occupational Therapy Goals          Problem: Occupational Therapy    Goal Priority Disciplines Outcome Interventions   Occupational Therapy Goal     OT, PT/OT Ongoing, Progressing    Description: Goals to be met by: 02/09/2023      Patient will increase functional independence with ADLs by performing:    UE Dressing with Supervision.  LE Dressing with Minimal Assistance.  Grooming while standing with Stand-by Assistance.  Toileting from bedside commode or toilet with Contact Guard Assistance for hygiene and clothing management.   Toilet transfer to bedside commode or toilet with Contact Guard Assistance.  Increased functional strength to WFL for self care.  Upper extremity exercise program x10 reps per handout, with independence.                          Time Tracking:     OT Date of Treatment: 01/18/23  OT Start Time: 0957  OT Stop Time: 1020  OT Total Time (min): 23 min    Billable Minutes:Self Care/Home Management 8  Therapeutic Exercise 15            1/18/2023

## 2023-01-18 NOTE — PLAN OF CARE
AAOx4. Minimal c/o pain. Tolerating cardiac diet. IV ABX given per MAR. Suprapubic cath intact. Cardiac monitoring maintained, remains sinus riley. Bed locked in lowest position, bed alarm set and call bell within reach.

## 2023-01-19 ENCOUNTER — PATIENT OUTREACH (OUTPATIENT)
Dept: ADMINISTRATIVE | Facility: OTHER | Age: 68
End: 2023-01-19
Payer: MEDICARE

## 2023-01-19 ENCOUNTER — TELEPHONE (OUTPATIENT)
Dept: UROLOGY | Facility: CLINIC | Age: 68
End: 2023-01-19
Payer: MEDICARE

## 2023-01-19 VITALS
OXYGEN SATURATION: 94 % | SYSTOLIC BLOOD PRESSURE: 107 MMHG | RESPIRATION RATE: 18 BRPM | WEIGHT: 228.19 LBS | HEART RATE: 49 BPM | DIASTOLIC BLOOD PRESSURE: 52 MMHG | BODY MASS INDEX: 35.82 KG/M2 | TEMPERATURE: 98 F | HEIGHT: 67 IN

## 2023-01-19 PROCEDURE — 94761 N-INVAS EAR/PLS OXIMETRY MLT: CPT | Mod: HCNC

## 2023-01-19 PROCEDURE — 25000003 PHARM REV CODE 250: Mod: HCNC | Performed by: NURSE PRACTITIONER

## 2023-01-19 PROCEDURE — 94660 CPAP INITIATION&MGMT: CPT | Mod: HCNC

## 2023-01-19 PROCEDURE — 99900035 HC TECH TIME PER 15 MIN (STAT): Mod: HCNC

## 2023-01-19 PROCEDURE — 25000003 PHARM REV CODE 250: Mod: HCNC | Performed by: STUDENT IN AN ORGANIZED HEALTH CARE EDUCATION/TRAINING PROGRAM

## 2023-01-19 PROCEDURE — 25000003 PHARM REV CODE 250: Mod: HCNC | Performed by: HOSPITALIST

## 2023-01-19 PROCEDURE — 63600175 PHARM REV CODE 636 W HCPCS: Mod: HCNC | Performed by: NURSE PRACTITIONER

## 2023-01-19 PROCEDURE — 25000003 PHARM REV CODE 250: Mod: HCNC | Performed by: INTERNAL MEDICINE

## 2023-01-19 PROCEDURE — 63600175 PHARM REV CODE 636 W HCPCS: Mod: HCNC | Performed by: STUDENT IN AN ORGANIZED HEALTH CARE EDUCATION/TRAINING PROGRAM

## 2023-01-19 PROCEDURE — 63600175 PHARM REV CODE 636 W HCPCS: Mod: HCNC | Performed by: HOSPITALIST

## 2023-01-19 RX ORDER — FLUDROCORTISONE ACETATE 0.1 MG/1
100 TABLET ORAL DAILY
Qty: 30 TABLET | Refills: 0
Start: 2023-01-19 | End: 2023-02-18

## 2023-01-19 RX ORDER — CEPHALEXIN 500 MG/1
500 CAPSULE ORAL 4 TIMES DAILY
Start: 2023-01-19 | End: 2023-02-10 | Stop reason: ALTCHOICE

## 2023-01-19 RX ORDER — LEVOTHYROXINE SODIUM 137 UG/1
150 TABLET ORAL
Qty: 90 TABLET | Refills: 3 | Status: SHIPPED | OUTPATIENT
Start: 2023-01-19 | End: 2023-03-23 | Stop reason: SDUPTHER

## 2023-01-19 RX ORDER — HYDROCORTISONE 5 MG/1
5 TABLET ORAL NIGHTLY
Qty: 10 TABLET | Refills: 0
Start: 2023-01-19 | End: 2023-03-23 | Stop reason: DRUGHIGH

## 2023-01-19 RX ORDER — HYDROCODONE BITARTRATE AND ACETAMINOPHEN 5; 325 MG/1; MG/1
1 TABLET ORAL EVERY 8 HOURS PRN
Qty: 10 TABLET | Refills: 0 | Status: SHIPPED | OUTPATIENT
Start: 2023-01-19 | End: 2023-03-23 | Stop reason: DRUGHIGH

## 2023-01-19 RX ORDER — FERROUS SULFATE 325(65) MG
325 TABLET, DELAYED RELEASE (ENTERIC COATED) ORAL EVERY OTHER DAY
Start: 2023-01-19 | End: 2023-03-23

## 2023-01-19 RX ORDER — FUROSEMIDE 10 MG/ML
40 INJECTION INTRAMUSCULAR; INTRAVENOUS ONCE
Status: COMPLETED | OUTPATIENT
Start: 2023-01-19 | End: 2023-01-19

## 2023-01-19 RX ORDER — HYDROCORTISONE 10 MG/1
10 TABLET ORAL DAILY
Qty: 10 TABLET | Refills: 0
Start: 2023-01-19 | End: 2023-01-25 | Stop reason: SDUPTHER

## 2023-01-19 RX ORDER — LIDOCAINE 50 MG/G
1 PATCH TOPICAL DAILY
Refills: 0
Start: 2023-01-19

## 2023-01-19 RX ADMIN — BUTALBITAL, ACETAMINOPHEN, AND CAFFEINE 1 TABLET: 50; 325; 40 TABLET ORAL at 01:01

## 2023-01-19 RX ADMIN — ONDANSETRON HYDROCHLORIDE 4 MG: 2 SOLUTION INTRAMUSCULAR; INTRAVENOUS at 08:01

## 2023-01-19 RX ADMIN — LIDOCAINE 1 PATCH: 50 PATCH CUTANEOUS at 02:01

## 2023-01-19 RX ADMIN — HYDROCODONE BITARTRATE AND ACETAMINOPHEN 1 TABLET: 5; 325 TABLET ORAL at 08:01

## 2023-01-19 RX ADMIN — CEFAZOLIN SODIUM 2 G: 2 SOLUTION INTRAVENOUS at 02:01

## 2023-01-19 RX ADMIN — FUROSEMIDE 40 MG: 10 INJECTION, SOLUTION INTRAMUSCULAR; INTRAVENOUS at 08:01

## 2023-01-19 RX ADMIN — ENOXAPARIN SODIUM 40 MG: 40 INJECTION SUBCUTANEOUS at 05:01

## 2023-01-19 RX ADMIN — FLUDROCORTISONE ACETATE 100 MCG: 0.1 TABLET ORAL at 08:01

## 2023-01-19 RX ADMIN — HYDROCORTISONE 10 MG: 5 TABLET ORAL at 08:01

## 2023-01-19 RX ADMIN — DOCUSATE SODIUM - SENNOSIDES 1 TABLET: 50; 8.6 TABLET, FILM COATED ORAL at 08:01

## 2023-01-19 RX ADMIN — POLYETHYLENE GLYCOL 3350 17 G: 17 POWDER, FOR SOLUTION ORAL at 10:01

## 2023-01-19 RX ADMIN — FERROUS SULFATE TAB 325 MG (65 MG ELEMENTAL FE) 1 EACH: 325 (65 FE) TAB at 08:01

## 2023-01-19 RX ADMIN — CEFAZOLIN SODIUM 2 G: 2 SOLUTION INTRAVENOUS at 05:01

## 2023-01-19 RX ADMIN — FLUOXETINE 60 MG: 20 CAPSULE ORAL at 08:01

## 2023-01-19 RX ADMIN — PANTOPRAZOLE SODIUM 40 MG: 40 TABLET, DELAYED RELEASE ORAL at 08:01

## 2023-01-19 RX ADMIN — ASPIRIN 81 MG: 81 TABLET, COATED ORAL at 08:01

## 2023-01-19 RX ADMIN — ATORVASTATIN CALCIUM 80 MG: 40 TABLET, FILM COATED ORAL at 08:01

## 2023-01-19 RX ADMIN — OXYBUTYNIN CHLORIDE 5 MG: 5 TABLET, EXTENDED RELEASE ORAL at 08:01

## 2023-01-19 NOTE — PLAN OF CARE
GERSON attempted to call [facility], Melissa with admissions is she is still meeting with the family. GERSON still pending number for report. GERSON will follow.     GIO Mckinley  [phone]     01/19/23 1158   Post-Acute Status   Post-Acute Authorization Placement   Coverage [employer]   Discharge Plan   Discharge Plan A Skilled Nursing Facility

## 2023-01-19 NOTE — CARE UPDATE
Requesting juices and food entire shift,slept very little,reinstructed pt on 1800 fluid rest,states understanding,still calling for above.

## 2023-01-19 NOTE — NURSING
Home Oxygen Evaluation    Date Performed: 2023    1) Patient's Home O2 Sat on room air, while at rest: 94        If O2 sats on room air at rest are 88% or below, patient qualifies. No additional testing needed. Document N/A in steps 2 and 3. If 89% or above, complete steps 2.      2) Patient's O2 Sat on room air while exercisin        If O2 sats on room air while exercising remain 89% or above patient does not qualify, no further testing needed Document N/A in step 3. If O2 sats on room air while exercising are 88% or below, continue to step 3.      3) Patient's O2 Sat while exercising on O2: n/a at n/a LPM         (Must show improvement from #2 for patients to qualify)    If O2 sats improve on oxygen, patient qualifies for portable oxygen. If not, the patient does not qualify.

## 2023-01-19 NOTE — PLAN OF CARE
HARJEET sent facility transfer orders along with updated info to [facility] Phone: [phone]  via [place] this AM. HARJEET also attempted to call [facility] there was no answer and harjeet was unable to leave a VM. HARJEET will follow.     GIO Mckinley  [phone]     01/19/23 0830   Post-Acute Status   Post-Acute Authorization Placement   Coverage [facility] Resources/Appts/Education Provided Appointments scheduled and added to AVS   Discharge Delays   (insurance auth)   Discharge Plan   Discharge Plan A Skilled Nursing Facility

## 2023-01-19 NOTE — PLAN OF CARE
SW met with pt at bedside to complete final assessment. Pt was approved by [facility] Phone: [phone], Please call report to Nurse Garcia/SHREE Reeder nurse. Pt transport will arrive at 5pm. Pt signed pt choice form. SW requested f/u appts. Rounds completed on pt.  All questions addressed.  Bedside nurse to discuss d/c medications.  Discussed importance to attend all f/u appts and take medications as prescribed.  Verbalized understanding.    Torres Jostin, [facility]  [phone]    Future Appointments   Date Time Provider Department Center   1/26/2023 11:00 AM Cat Covington NP [facility] UROLOGY [facility]   2/17/2023  8:00 AM Juan A Madera MD [facility] PSYCH [facility]   3/3/2023  2:00 PM Sunni Starks MD [facility]M [facility]   7/7/2023 11:00 AM Mesfin Hodges II, MD [facility] IM [facility]        01/19/23 1613   Final Note   Assessment Type Final Discharge Note   Anticipated Discharge Disposition SNF   What phone number can be called within the next 1-3 days to see how you are doing after discharge? [phone]   Hospital Resources/Appts/Education Provided Appointments scheduled and added to AVS   Post-Acute Status   Post-Acute Authorization Placement   Post-Acute Placement Status Set-up Complete/Auth obtained   Coverage [facility]   Discharge Delays None known at this time       Initial Assessment (most recent)       Adult Discharge Assessment - 01/11/23 1348          Discharge Assessment    Assessment Type Discharge Planning Assessment     Confirmed/corrected address, phone number and insurance Yes     Confirmed Demographics Correct on Facesheet     Source of Information patient     When was your last doctors appointment? 01/06/23     Communicated JACKIE with patient/caregiver Date not available/Unable to determine     Reason For Admission UTI     People in Home spouse     Do you expect to return to your current living situation? Yes     Do you have help at home or someone to help you manage your care at home? Yes      Who are your caregiver(s) and their phone number(s)? Dangelo Hawley(spouse)[phone]     Prior to hospitilization cognitive status: Alert/Oriented     Current cognitive status: Alert/Oriented     Walking or Climbing Stairs ambulation difficulty, requires equipment;stair climbing difficulty, requires equipment;transferring difficulty, requires equipment     Dressing/Bathing bathing difficulty, assistance 1 person     Home Accessibility wheelchair accessible   pt has a ramp built    Home Layout Able to live on 1st floor     Equipment Currently Used at Home wheelchair;rollator;bedside commode;bath bench     Readmission within 30 days? No     Patient currently being followed by outpatient case management? Yes     If yes, name of outpatient case management following: [facility] outpatient case management     Do you currently have service(s) that help you manage your care at home? --   the pt's current with [facility]    Do you take prescription medications? Yes     Do you have prescription coverage? Yes     Coverage [employer] Managed Medicare/Medicaid [facility].     Do you have any problems affording any of your prescribed medications? No   the pt receives her meds affordably at [facility]    Is the patient taking medications as prescribed? yes     Who is going to help you get home at discharge? Dangelo Hawley(spouse)[phone]     How do you get to doctors appointments? family or friend will provide     Are you on dialysis? No     Do you take coumadin? No     Discharge Plan A Home Health   the pt's current with [facility]    Discharge Plan B Other   TBD    DME Needed Upon Discharge  other (see comments)   TBD    Discharge Plan discussed with: Patient     Discharge Barriers Identified None        Physical Activity    On average, how many days per week do you engage in moderate to strenuous exercise (like a brisk walk)? 0 days     On average, how many minutes do you  engage in exercise at this level? 0 min        Financial Resource Strain    How hard is it for you to pay for the very basics like food, housing, medical care, and heating? Not hard at all        Housing Stability    In the last 12 months, was there a time when you were not able to pay the mortgage or rent on time? No     In the last 12 months, how many places have you lived? 1     In the last 12 months, was there a time when you did not have a steady place to sleep or slept in a shelter (including now)? No        Transportation Needs    In the past 12 months, has lack of transportation kept you from medical appointments or from getting medications? Yes     In the past 12 months, has lack of transportation kept you from meetings, work, or from getting things needed for daily living? Yes        Food Insecurity    Within the past 12 months, you worried that your food would run out before you got the money to buy more. Never true     Within the past 12 months, the food you bought just didn't last and you didn't have money to get more. Never true        Stress    Do you feel stress - tense, restless, nervous, or anxious, or unable to sleep at night because your mind is troubled all the time - these days? Rather much        Social Connections    In a typical week, how many times do you talk on the phone with family, friends, or neighbors? More than three times a week     How often do you get together with friends or relatives? More than three times a week     How often do you attend Sikh or Congregational services? Never     Do you belong to any clubs or organizations such as Sikh groups, unions, fraternal or athletic groups, or school groups? Yes     How often do you attend meetings of the clubs or organizations you belong to? Never     Are you , , , , never , or living with a partner?         Alcohol Use    Q1: How often do you have a drink containing alcohol? Never     Q2:  How many drinks containing alcohol do you have on a typical day when you are drinking? Patient does not drink     Q3: How often do you have six or more drinks on one occasion? Never        OTHER    Name(s) of People in Home Dangelo Hawley(spouse)[phone]

## 2023-01-19 NOTE — PROGRESS NOTES
RSW met with patient to discuss discharge and additional patient barriers to care. Pt identified no additional social barriers to care. Per pt, pt is not in need of resources at this time.    The following were addressed during this visit:  -Complete follow-up with patient    ML Weir

## 2023-01-19 NOTE — PROGRESS NOTES
"[facility] - Med Surg  Adult Nutrition  Progress Note    SUMMARY     Recommendations  1) Add Boost Plus daily for additional kcal/protein   2) Continue cardiac diet with fluid restriction   3) RD to follow up    Goals: Pt to meet > 75% EEN by RD follow up  Nutrition Goal Status: new  Communication of RD Recs: other (comment) (POC, sticky note)    Assessment and Plan    Nutrition Problem  Inadequate oral intake of meals    Related to (etiology):   Decreased appetite    Signs and Symptoms (as evidenced by):   50% intake at meals per chart   Pt requesting food and drinks throughout night    Interventions(treatment strategy):  Collaboration with other providers  Fluid Restricted Diet   Fat and Mineral Modified diet    Nutrition Diagnosis Status:   New      Malnutrition Assessment   Fluid Accumulation (Malnutrition): mild     Edema (Fluid Accumulation): 2-->mild      Reason for Assessment  Reason For Assessment: length of stay  Diagnosis: other (see comments) (UTI)  Relevant Medical History: Neurogenic bladder s/p suprapubic placement, CHF, hypothyroidism  Interdisciplinary Rounds: did not attend  General Information Comments: RD remote for coverage, pt admitted for UTI. Called pt on room phone - no answer. With CHF, 1800 mL FR, 2+ LE edema. Per RN, requested food and juice frequently throughout night shift. Per chart, 50% intake meals but skipped dinner last night. LBM 1/15 per chart - with opioid induced constipation, on aggressive bowel regimen.  Nutrition Discharge Planning: Discharge on salt restricted/fluid restricted diet    Nutrition Risk Screen  Nutrition Risk Screen: no indicators present    Nutrition/Diet History  Spiritual, Cultural Beliefs, Druze Practices, Values that Affect Care: no  Food Allergies: NKFA    Anthropometrics  Temp: 98.1 °F (36.7 °C)  Height Method: Stated  Height: 5' 7" (170.2 cm)  Height (inches): 67 in  Weight Method: Bed Scale  Weight: 103.5 kg (228 lb 2.8 oz)  Weight (lb): 228.18 " lb  Ideal Body Weight (IBW), Female: 135 lb  % Ideal Body Weight, Female (lb): 140 %  BMI (Calculated): 35.7       Lab/Procedures/Meds  Pertinent Labs Reviewed: reviewed  Pertinent Labs Comments: Hgb A1c 5.0 on 7/28/22  Pertinent Medications Reviewed: reviewed  Pertinent Medications Comments: atorvastatin, ferrous sulfate, pantoprazole, polyethylene glycol, senna-docusate    Estimated/Assessed Needs  Weight Used For Calorie Calculations: 103.4 kg (228 lb)  Energy Calorie Requirements (kcal): 1606 kcal/day based on MSJ x no AF for BMI > 30  Energy Need Method: [place]-St Jeor  Protein Requirements: 83 g/day based on 0.8 g/kg  Weight Used For Protein Calculations: 103.4 kg (228 lb)  Fluid Requirements (mL): 1800 mL FR per MD for CHF  Estimated Fluid Requirement Method: other (see comments)  RDA Method (mL): 1606  CHO Requirement: 201 g CHO/day based on 50% kcal from CHO      Nutrition Prescription Ordered  Current Diet Order: Cardiac, 1800 mL fluid restriction    Evaluation of Received Nutrient/Fluid Intake    Intake/Output Summary (Last 24 hours) at 1/19/2023 0806  Last data filed at 1/19/2023 0557  Gross per 24 hour   Intake 1177.35 ml   Output 1950 ml   Net -772.65 ml     I/O: -10.2 L since admit  Energy Calories Required: not meeting needs  Protein Required: not meeting needs  Fluid Required: meeting needs  Comments: LBM 1/15  Tolerance: tolerating  % Intake of Estimated Energy Needs: 50 - 75 %  % Meal Intake: Other: 50%    Nutrition Risk  Level of Risk/Frequency of Follow-up:  (1-2x weekly)     Monitor and Evaluation  Food and Nutrient Intake: energy intake, food and beverage intake  Food and Nutrient Adminstration: diet order  Knowledge/Beliefs/Attitudes: beliefs and attitudes  Physical Activity and Function: nutrition-related ADLs and IADLs  Anthropometric Measurements: weight change, body mass index, weight  Biochemical Data, Medical Tests and Procedures: electrolyte and renal panel, lipid profile,  gastrointestinal profile, glucose/endocrine profile, inflammatory profile  Nutrition-Focused Physical Findings: overall appearance, extremities, muscles and bones, skin     Nutrition Follow-Up  RD Follow-up?: Yes

## 2023-01-19 NOTE — TELEPHONE ENCOUNTER
----- Message from Valentino Esquivel sent at 1/19/2023  9:26 AM CST -----  Contact: pt  Pt requesting call back RE: Pt calling to advise that she is currently in hospital        Confirmed contact below:  Contact Name:Tasha Hawley  Phone Number: [phone]

## 2023-01-19 NOTE — PLAN OF CARE
[facility]    FACILITY TRANSFER ORDERS      Patient Name: Tasha Hawley  YOB: 1955    PCP: Mesfin Hodges Ii, MD   PCP Address: [facility] / [place] [address]  PCP Phone Number: [phone]  PCP Fax: [phone]    Encounter Date: 01/19/2023    Admit to: SNF    Vital Signs:  Routine    Diagnoses:   Active Hospital Problems    Diagnosis  POA    *UTI (urinary tract infection) [N39.0]  Yes     Chronic    Adrenal insufficiency [E27.40]  Yes    Anxiety [F41.9]  Yes    Debility [R53.81]  Yes    Hypotension [I95.9]  No    Constipation due to opioid therapy [K59.03, T40.2X5A]  Yes    Chronic diastolic heart failure [I50.32]  Yes    Suprapubic catheter [Z93.59]  Not Applicable     Chronic    Bradycardia [R00.1]  Yes    Primary hypothyroidism [E03.9]  Yes     Chronic    Coronary artery disease of native artery with stable angina pectoris [I25.118]  Yes    Narcotic dependency, continuous [F11.20]  Yes     Chronic    Sleep apnea [G47.30]  Yes    Iron deficiency anemia [D50.9]  Yes      Resolved Hospital Problems    Diagnosis Date Resolved POA    Hypokalemia [E87.6] 01/10/2023 Yes    Hyponatremia [E87.1] 01/10/2023 Yes       Allergies:  Review of patient's allergies indicates:   Allergen Reactions    (d)-limonene flavor      Other reaction(s): difficult intubation  Other reaction(s): Difficulty breathing    Bactrim [sulfamethoxazole-trimethoprim] Anaphylaxis    Benadryl [diphenhydramine hcl] Shortness Of Breath    Fentanyl Itching, Nausea And Vomiting and Swelling             Imitrex [sumatriptan succinate] Shortness Of Breath    Percocet [oxycodone-acetaminophen] Shortness Of Breath    Topamax [topiramate] Shortness Of Breath    Vancomycin Anaphylaxis     Rash    Butorphanol tartrate     Darvocet a500 [propoxyphene n-acetaminophen]      Other reaction(s): Difficulty breathing    Evening primrose (oenothera biennis)     White petrolatum-zinc     Zinc oxide-white petrolatum       Other reaction(s): Difficulty breathing    Latex, natural rubber Itching and Rash    Phenytoin Rash and Other (See Comments)     Trouble breathing       Diet: cardiac diet    Activities: Activity as tolerated    Goals of Care Treatment Preferences:  Code Status: Full Code    Health care agent: Dangelo Hawley (spouse)  Health care agent number: [phone]          What is most important right now is to focus on symptom/pain control, extending life as long as possible, even it it means sacrificing quality.  Accordingly, we have decided that the best plan to meet the patient's goals includes continuing with treatment.      Nursing: per facility protocol     Labs: per facility protocol    CONSULTS:    Physical Therapy to evaluate and treat. , Occupational Therapy to evaluate and treat., and  to evaluate for community resources/long-range planning.    MISCELLANEOUS CARE:  Motley Care: Empty Motley bag every shift. Change Motley every month. and Routine Skin for Bedridden Patients: Apply moisture barrier cream to all skin folds and wet areas in perineal area daily and after baths and all bowel movements.  O2 2L prn sat <92    WOUND CARE ORDERS  None    Medications: Review discharge medications with patient and family and provide education.      Current Discharge Medication List        START taking these medications    Details   cephALEXin (KEFLEX) 500 MG capsule Take 1 capsule (500 mg total) by mouth 4 (four) times daily. End 1/25/23      ferrous sulfate 325 (65 FE) MG EC tablet Take 1 tablet (325 mg total) by mouth every other day.      fludrocortisone (FLORINEF) 0.1 mg Tab Take 1 tablet (100 mcg total) by mouth once daily.  Qty: 30 tablet, Refills: 0      HYDROcodone-acetaminophen (NORCO) 5-325 mg per tablet Take 1 tablet by mouth every 8 (eight) hours as needed (breakthrough).  Qty: 10 tablet, Refills: 0    Comments: Quantity prescribed more than 7 day supply? No      !! hydrocortisone (CORTEF) 10 MG Tab  Take 1 tablet (10 mg total) by mouth once daily.  Qty: 10 tablet, Refills: 0      !! hydrocortisone (CORTEF) 5 MG Tab Take 1 tablet (5 mg total) by mouth every evening.  Qty: 10 tablet, Refills: 0      LIDOcaine (LIDODERM) 5 % Place 1 patch onto the skin once daily. Remove & Discard patch within 12 hours or as directed by MD  Refills: 0       !! - Potential duplicate medications found. Please discuss with provider.        CONTINUE these medications which have CHANGED    Details   levothyroxine (SYNTHROID) 137 MCG Tab tablet Take 1 tablet (137 mcg total) by mouth before breakfast.  Qty: 90 tablet, Refills: 3    Associated Diagnoses: Primary hypothyroidism           CONTINUE these medications which have NOT CHANGED    Details   aspirin (ECOTRIN) 81 MG EC tablet Take 1 tablet (81 mg total) by mouth once daily.  Qty: 30 tablet, Refills: 0      atorvastatin (LIPITOR) 80 MG tablet TAKE ONE TABLET BY MOUTH EVERY DAY  Qty: 90 tablet, Refills: 3    Associated Diagnoses: Mixed hyperlipidemia      butalbital-acetaminophen-caffeine -40 mg (FIORICET, ESGIC) -40 mg per tablet Take 1 tablet by mouth daily as needed.  Qty: 15 tablet, Refills: 0      FLUoxetine 20 MG capsule Take 3 capsules (60 mg total) by mouth once daily.  Qty: 270 capsule, Refills: 3    Associated Diagnoses: Anxiety      furosemide (LASIX) 40 MG tablet Take 1 tablet (40 mg total) by mouth once daily.  Qty: 90 tablet, Refills: 3      intrathecal pain pump compound Hydromorphone (7.5 mg/mL) infusion at 3.6799 mg/day (0.1533 mg/hr) out of a total reservoir volume of 37.3 mL  Pump filled every 2 months      pantoprazole (PROTONIX) 40 MG tablet Take 1 tablet (40 mg total) by mouth once daily.  Qty: 90 tablet, Refills: 3    Associated Diagnoses: Gastroesophageal reflux disease, unspecified whether esophagitis present      potassium chloride SA (K-DUR,KLOR-CON) 20 MEQ tablet Take 1 tablet (20 mEq total) by mouth 2 (two) times daily.  Qty: 180 tablet,  Refills: 3    Associated Diagnoses: Congestive heart failure, unspecified HF chronicity, unspecified heart failure type      QUEtiapine (SEROQUEL) 100 MG Tab TAKE 2 TABLETS (200 MG) BY MOUTH NIGHTLY  Qty: 180 tablet, Refills: 3    Associated Diagnoses: Insomnia due to medical condition      senna (SENNA LAX) 8.6 mg tablet Take 2 tablets by mouth 2 (two) times daily.      traZODone (DESYREL) 300 MG tablet Take 1 tablet (300 mg total) by mouth every evening.  Qty: 90 tablet, Refills: 0    Associated Diagnoses: Insomnia due to medical condition      nitroGLYCERIN (NITROSTAT) 0.4 MG SL tablet Place 1 tablet (0.4 mg total) under the tongue every 5 (five) minutes as needed for Chest pain.  Qty: 25 tablet, Refills: 3    Associated Diagnoses: Coronary artery disease involving native coronary artery of native heart with angina pectoris      promethazine (PHENERGAN) 25 MG tablet Take 25 mg by mouth every 6 (six) hours as needed for Nausea.           STOP taking these medications       HYDROcodone-acetaminophen (NORCO)  mg per tablet Comments:   Reason for Stopping:         tiZANidine (ZANAFLEX) 4 MG tablet Comments:   Reason for Stopping:                  Immunizations Administered as of 1/19/2023       Name Date Dose VIS Date Route Exp Date    COVID-19, MRNA, LN-S, PF (Pfizer) (Purple Cap) 4/23/2021 10:56 AM 0.3 mL 12/12/2020 Intramuscular 7/31/2021    Site: Right deltoid     Given By: Mirna Milner     : Pf[employer] Inc     Lot: [id number]     COVID-19, MRNA, LN-S, PF (Pfizer) (Purple Cap) 4/2/2021 12:39 PM 0.3 mL 12/12/2020 Intramuscular 7/31/2021    Site: Right deltoid     Given By: Juan Day MA     : Pf[employer] Inc     Lot: [id number]             This patient has had both covid vaccinations    Some patients may experience side effects after vaccination.  These may include fever, headache, muscle or joint aches.  Most symptoms resolve with 24-48 hours and do not require urgent medical evaluation  unless they persist for more than 72 hours or symptoms are concerning for an unrelated medical condition.          _________________________________  Nic Mistry MD  01/19/2023

## 2023-01-19 NOTE — PLAN OF CARE
RN Case  Order Review      Date:  01/19/2023  Discharging Facility noted:   [facility] (Torres NIETO assigned)  Med List Reconciled?:     Sent to facility changes requested communicated to MD. Narcotic paper RX on unit. GERSON aware.    Lines noted: [facility] orders and care noted on orders    LBM:   currently last documented for 1/14. Discussed with nursing. Pt has been refusing daily Miralax and on narcotic pain meds.   Woundcare/Wound vac:  N/A    ABX with end date:    cephALEXin (KEFLEX) 500 MG capsule Take 1 capsule (500 mg total) by mouth 4 (four) times daily. End 1/25/23     Discharging with Line:  [facility]    Ins auth:   Pending auth    Intake forms completed:    Intake forms DONE

## 2023-01-19 NOTE — PLAN OF CARE
I spoke with Cat with [facility] Phone: [phone].  She is calling [employer] now to check auth.  All other info is in Care Port.       01/19/23 0910   Post-Acute Status   Post-Acute Authorization Placement       Ameya Porter, RN   Supervisor Case Management[facility]  [phone]

## 2023-01-19 NOTE — PLAN OF CARE
Recommendations  1) Add Boost Plus daily for additional kcal/protein   2) Continue cardiac diet with fluid restriction   3) RD to follow up    Goals: Pt to meet > 75% EEN by RD follow up  Nutrition Goal Status: new  Communication of RD Recs: other (comment) (POC, sticky note)    Assessment and Plan    Nutrition Problem  Inadequate oral intake of meals    Related to (etiology):   Decreased appetite    Signs and Symptoms (as evidenced by):   50% intake at meals per chart   Pt requesting food and drinks throughout night    Interventions(treatment strategy):  Collaboration with other providers  Fluid Restricted Diet   Fat and Mineral Modified diet    Nutrition Diagnosis Status:   New

## 2023-01-19 NOTE — PLAN OF CARE
AAO , tele sb,no current c/o of cp or sob,,prns given for c/o back pain and headache,no leakage currently noted from sp cath,poc reviewed,bed alarm set.

## 2023-01-20 ENCOUNTER — PATIENT OUTREACH (OUTPATIENT)
Dept: ADMINISTRATIVE | Facility: CLINIC | Age: 68
End: 2023-01-20
Payer: MEDICARE

## 2023-01-20 ENCOUNTER — PATIENT OUTREACH (OUTPATIENT)
Dept: ADMINISTRATIVE | Facility: OTHER | Age: 68
End: 2023-01-20
Payer: MEDICARE

## 2023-01-20 LAB
ACTH PLAS-MCNC: 10 PG/ML (ref 0–46)
ALDOST SERPL-MCNC: 6.9 NG/DL

## 2023-01-20 NOTE — SUBJECTIVE & OBJECTIVE
Called, spoke to pt. Wife   Two pt identifiers confirmed. Writer informed patient to call early on Monday morning of mid week because we do not do Cancellation list. She verbalized understanding of information discussed w/ no further questions at this time.    Mona Bailon LPN Interval History:No acute distress noted. Patient sitting up in bed eating breakfast. States continued pain but she is in good spirits and is well-appearing. Likely step-down out of ICU today.    Review of Systems   Constitutional:  Negative for fatigue and fever.   HENT:  Negative for trouble swallowing.    Respiratory:  Negative for shortness of breath.    Cardiovascular:  Positive for leg swelling. Negative for chest pain and palpitations.   Gastrointestinal:  Negative for abdominal pain.   Genitourinary:  Positive for pelvic pain.        Suprapubic catheter   Musculoskeletal:  Positive for back pain, gait problem and myalgias.   Neurological:  Positive for weakness. Negative for dizziness, light-headedness, numbness and headaches.   Psychiatric/Behavioral:  Negative for agitation and confusion. The patient is not nervous/anxious.    Objective:     Vital Signs (Most Recent):  Temp: 98.2 °F (36.8 °C) (01/12/23 0745)  Pulse: (!) 43 (01/12/23 0745)  Resp: 15 (01/12/23 0745)  BP: (!) 88/48 (01/12/23 0745)  SpO2: 98 % (01/12/23 0815)   Vital Signs (24h Range):  Temp:  [97.3 °F (36.3 °C)-98.2 °F (36.8 °C)] 98.2 °F (36.8 °C)  Pulse:  [42-57] 43  Resp:  [10-35] 15  SpO2:  [90 %-100 %] 98 %  BP: ()/(39-60) 88/48     Weight: 85.7 kg (189 lb)  Body mass index is 29.6 kg/m².    Intake/Output Summary (Last 24 hours) at 1/12/2023 0933  Last data filed at 1/12/2023 0644  Gross per 24 hour   Intake 1535.56 ml   Output 1870 ml   Net -334.44 ml        Physical Exam  Vitals and nursing note reviewed.   Constitutional:       General: She is not in acute distress.     Appearance: She is obese.   HENT:      Head: Normocephalic and atraumatic.   Eyes:      Pupils: Pupils are equal, round, and reactive to light.   Cardiovascular:      Rate and Rhythm: Regular rhythm. Bradycardia present.      Pulses:           Dorsalis pedis pulses are 1+ on the right side and 1+ on the left side.      Heart sounds: Normal heart sounds.    Pulmonary:      Effort: Pulmonary effort is normal. No respiratory distress.      Breath sounds: Decreased breath sounds present.   Abdominal:      General: Bowel sounds are normal. There is no distension.      Palpations: Abdomen is soft.      Tenderness: There is no abdominal tenderness.   Genitourinary:     Comments: Suprapubic catheter  Musculoskeletal:      Right lower le+ Edema present.      Left lower le+ Edema present.   Skin:     General: Skin is warm.   Neurological:      Mental Status: She is alert and oriented to person, place, and time.      Motor: Weakness present.   Psychiatric:         Mood and Affect: Mood normal.         Behavior: Behavior normal.         Thought Content: Thought content normal.         Judgment: Judgment normal.       Significant Labs: All pertinent labs within the past 24 hours have been reviewed.    Significant Imaging: I have reviewed all pertinent imaging results/findings within the past 24 hours.

## 2023-01-20 NOTE — PROGRESS NOTES
IP Liaison - Final Visit Note    Patient: Tasha Hawley  MRN:  [id number]  Date of Service:  1/20/2023  Completed by:  ML Weir    Reason for Visit   Patient presents with    IP Liaison Chart Review        Patient Summary     Discharge Date: 1/19/2023  Discharge telephone number/address: [phone] / [facility]  Follow up provider: Cat Covington NP / Omaira Henriquez NP / Norma Pearson MD  Follow up appointments: 1/26/2023 @ 11:00am / 4/28/2023 @ 1:40pm / 5/4/2023 @ 11:00am  Home Health agency & telephone number: n/a  DME ordered &  name: n/a  Assigned OPCM RN/SW: n/a  Report sent to follow up team (PCP/OPCM) via in basket message: n/a  Community Resources arranged including agency name & contact info: n/a      ML Weir

## 2023-01-20 NOTE — PLAN OF CARE
Patient A&Ox4. Suprapubic cath intact and draining well. Home O2 eval done. Report given to nurse Garcia at [facility]. AVS printed and included in dc packet. Tele removed. Awaiting transport.

## 2023-01-21 NOTE — DISCHARGE SUMMARY
[facility] Medicine  Discharge Summary      Patient Name: Tasha Hawley  MRN: [id number]  LUH: [id number]  Patient Class: IP- Inpatient  Admission Date: 1/8/2023  Hospital Length of Stay: 9 days  Discharge Date and Time: 1/19/2023  7:32 PM  Attending Physician: No att. providers found   Discharging Provider: Nic Mistry MD  Primary Care Provider: Mesfin Hodges Ii, MD    Primary Care Team: Networked reference to record PCT     HPI:   Tasha Villalpando is a 67yr female with PMH Neurogenic bladder s/p suprapubic placement, CHF, hypothyroidism who presents with decreased urine output from catheter and hematuria.    Pt states that she has had pain from suprapubic catheter, with decreased urine output and hematuria. Catheter is changed every three days, per patient. She states she recently had an oupatient urine cultures positive for staph, but pending susceptibilities. She reports allergies to vancomycin and bactrium.    In the ED, initial triage vitals were significant for slightly low Bps. CBC showed microcytic anemia. BMP showed hyponatremia and hypokalemia. UA showed several WBC and RBC with moderate bacteria. UC from 01/06 resulted showing oxacillin and bactrium sensitive staph aureus. BC and UC from 01/08 are pending.    While in the ED, pt complained of lower abdominal pain and decreased output, with apparent clot noted on flushing. With manipulation, provider repositioned catheter, quickly obtaining 300cc of output shortly afterwards. Pt  was started on clindamycin for UTI.    Medicine accepted pt for UTI.      * No surgery found *      Hospital Course:   Ms. Hawley presented with septic shock due to catheter associated UTI from indwelling suprapubic catheter present on arrival.  Initially treated with IV Ancef while in house and transition to p.o. Keflex by time of discharge.  Course complicated by significant hypotension while in house, although able to be weaned off pressors.   "Suspect partially related to chronic opioid use from pain pump.  Did do testing for adrenal insufficiency with inadequate cortisol response to cosyntropin stimulation.  Initiated on fludrocortisone and hydrocortisone at low doses.  Will refer to endocrinology as outpatient.  Of note, she did have hypercapnia during her stay in the ICU and used CPAP at night.  Possibly with undiagnosed sleep apnea.  Will need outpatient NIF PFT, MIP/MEP, and sleep study. She was evaluated by PT/ OT while in house, who recommend SNF placement.    BP (!) 107/52   Pulse (!) 49   Temp 98.1 °F (36.7 °C)   Resp 18   Ht 5' 7" (1.702 m)   Wt 103.5 kg (228 lb 2.8 oz)   LMP  (LMP Unknown)   SpO2 (!) 94%   BMI 35.74 kg/m²   Physical Exam  Vitals and nursing note reviewed.   Constitutional:       General: She is not in acute distress.     Appearance: She is obese.   HENT:      Head: Normocephalic and atraumatic.   Eyes:      Pupils: Pupils are equal, round, and reactive to light.   Cardiovascular:      Rate and Rhythm: Regular rhythm. Bradycardia present.      Pulses:           Dorsalis pedis pulses are 1+ on the right side and 1+ on the left side.      Heart sounds: Normal heart sounds.   Pulmonary:      Effort: Pulmonary effort is normal. No respiratory distress.      Breath sounds: Decreased breath sounds present.   Abdominal:      General: Bowel sounds are normal. There is no distension.      Palpations: Abdomen is soft.      Tenderness: There is no abdominal tenderness.   Genitourinary:     Comments: Suprapubic catheter  Musculoskeletal:      Right lower le+ Edema present.      Left lower le+ Edema present.   Skin:     General: Skin is warm.   Neurological:      Mental Status: She is alert and oriented to person, place, and time.      Motor: Weakness present.   Psychiatric:         Mood and Affect: Mood normal.         Behavior: Behavior normal.         Thought Content: Thought content normal.         Judgment: Judgment " normal.        Goals of Care Treatment Preferences:  Code Status: Full Code    Health care agent: Dangelo Hawley (spouse)  Health care agent number: [phone]          What is most important right now is to focus on symptom/pain control, extending life as long as possible, even it it means sacrificing quality.  Accordingly, we have decided that the best plan to meet the patient's goals includes continuing with treatment.      Consults:   Consults (From admission, onward)        Status Ordering Provider     Inpatient consult to Infectious Diseases  Once        Provider:  Alberto Rodriguez MD    Completed CHIDI HENRIQUEZ     Inpatient consult to Cardiology-[facility]  Once        Provider:  Nick Lozano MD    Completed CHIDI HENRIQUEZ     Inpatient consult to Palliative Care  Once        Provider:  Robert Stein Jr., MD    Completed CHIDI HENRIQUEZ     Inpatient consult to Urology  Once        Provider:  (Not yet assigned)    Completed CHIDI HENRIQUEZ     IP consult to case management  Once        Provider:  (Not yet assigned)    Completed CHRISTINE SWANSON          No new Assessment & Plan notes have been filed under this hospital service since the last note was generated.  Service: Hospital Medicine    Final Active Diagnoses:    Diagnosis Date Noted POA    PRINCIPAL PROBLEM:  UTI (urinary tract infection) [N39.0] 06/03/2021 Yes     Chronic    Adrenal insufficiency [E27.40] 01/17/2023 Yes    Anxiety [F41.9] 02/05/2022 Yes    Debility [R53.81] 01/13/2021 Yes    Hypotension [I95.9] 05/16/2020 No    Constipation due to opioid therapy [K59.03, T40.2X5A] 01/30/2020 Yes    Chronic diastolic heart failure [I50.32] 01/13/2020 Yes    Suprapubic catheter [Z93.59] 02/01/2018 Not Applicable     Chronic    Bradycardia [R00.1] 11/14/2017 Yes    Primary hypothyroidism [E03.9] 02/02/2017 Yes     Chronic    Coronary artery disease of native artery with stable angina pectoris [I25.118] 08/16/2016 Yes    Narcotic  dependency, continuous [F11.20] 07/18/2014 Yes     Chronic    Sleep apnea [G47.30]  Yes    Iron deficiency anemia [D50.9]  Yes      Problems Resolved During this Admission:    Diagnosis Date Noted Date Resolved POA    Hypokalemia [E87.6] 01/09/2023 01/10/2023 Yes    Hyponatremia [E87.1] 02/04/2022 01/10/2023 Yes       Discharged Condition: fair    Disposition: Home or Self Care    Follow Up:    Patient Instructions:      Ambulatory referral/consult to Sleep Disorders   Standing Status: Future   Referral Priority: Routine Referral Type: Consultation   Requested Specialty: Sleep Medicine   Number of Visits Requested: 1     Ambulatory referral/consult to Endocrinology   Standing Status: Future   Referral Priority: Routine Referral Type: Consultation   Requested Specialty: Endocrinology   Number of Visits Requested: 1     Diet Adult Regular     Notify your health care provider if you experience any of the following:  temperature >100.4     Notify your health care provider if you experience any of the following:  persistent nausea and vomiting or diarrhea     Notify your health care provider if you experience any of the following:  severe uncontrolled pain     Notify your health care provider if you experience any of the following:  difficulty breathing or increased cough     Notify your health care provider if you experience any of the following:  severe persistent headache     Notify your health care provider if you experience any of the following:  persistent dizziness, light-headedness, or visual disturbances     Notify your health care provider if you experience any of the following:  increased confusion or weakness     Respiratory mechanics   Standing Status: Future Standing Exp. Date: 03/19/24     MAXIMAL INSPIRATORY/EXPIRATORY PRESSURE   Standing Status: Future Standing Exp. Date: 01/19/24     Order Specific Question Answer Comments   Release to patient Immediate      Activity as tolerated       Significant  Diagnostic Studies: see above    Pending Diagnostic Studies:     None         Medications:  Reconciled Home Medications:      Medication List      START taking these medications    cephALEXin 500 MG capsule  Commonly known as: KEFLEX  Take 1 capsule (500 mg total) by mouth 4 (four) times daily. End 1/25/23     ferrous sulfate 325 (65 FE) MG EC tablet  Take 1 tablet (325 mg total) by mouth every other day.     fludrocortisone 0.1 mg Tab  Commonly known as: FLORINEF  Take 1 tablet (100 mcg total) by mouth once daily.     HYDROcodone-acetaminophen 5-325 mg per tablet  Commonly known as: NORCO  Take 1 tablet by mouth every 8 (eight) hours as needed (breakthrough).  Replaces: HYDROcodone-acetaminophen  mg per tablet     * hydrocortisone 10 MG Tab  Commonly known as: CORTEF  Take 1 tablet (10 mg total) by mouth once daily.     * hydrocortisone 5 MG Tab  Commonly known as: CORTEF  Take 1 tablet (5 mg total) by mouth every evening.     LIDOcaine 5 %  Commonly known as: LIDODERM  Place 1 patch onto the skin once daily. Remove & Discard patch within 12 hours or as directed by MD         * This list has 2 medication(s) that are the same as other medications prescribed for you. Read the directions carefully, and ask your doctor or other care provider to review them with you.            CHANGE how you take these medications    QUEtiapine 100 MG Tab  Commonly known as: SEROQUEL  TAKE 2 TABLETS (200 MG) BY MOUTH NIGHTLY  What changed: Another medication with the same name was removed. Continue taking this medication, and follow the directions you see here.        CONTINUE taking these medications    aspirin 81 MG EC tablet  Commonly known as: ECOTRIN  Take 1 tablet (81 mg total) by mouth once daily.     atorvastatin 80 MG tablet  Commonly known as: LIPITOR  TAKE ONE TABLET BY MOUTH EVERY DAY     butalbital-acetaminophen-caffeine -40 mg -40 mg per tablet  Commonly known as: FIORICET, ESGIC  Take 1 tablet by mouth  daily as needed.     FLUoxetine 20 MG capsule  Take 3 capsules (60 mg total) by mouth once daily.     furosemide 40 MG tablet  Commonly known as: LASIX  Take 1 tablet (40 mg total) by mouth once daily.     INTRATHECAL PAIN PUMP COMPOUND  Hydromorphone (7.5 mg/mL) infusion at 3.6799 mg/day (0.1533 mg/hr) out of a total reservoir volume of 37.3 mL  Pump filled every 2 months     levothyroxine 137 MCG Tab tablet  Commonly known as: SYNTHROID  Take 1 tablet (137 mcg total) by mouth before breakfast.     nitroGLYCERIN 0.4 MG SL tablet  Commonly known as: NITROSTAT  Place 1 tablet (0.4 mg total) under the tongue every 5 (five) minutes as needed for Chest pain.     pantoprazole 40 MG tablet  Commonly known as: PROTONIX  Take 1 tablet (40 mg total) by mouth once daily.     potassium chloride SA 20 MEQ tablet  Commonly known as: K-DUR,KLOR-CON  Take 1 tablet (20 mEq total) by mouth 2 (two) times daily.     promethazine 25 MG tablet  Commonly known as: PHENERGAN  Take 25 mg by mouth every 6 (six) hours as needed for Nausea.     senna 8.6 mg tablet  Commonly known as: SENNA LAX  Take 2 tablets by mouth 2 (two) times daily.     traZODone 300 MG tablet  Commonly known as: DESYREL  Take 1 tablet (300 mg total) by mouth every evening.        STOP taking these medications    HYDROcodone-acetaminophen  mg per tablet  Commonly known as: NORCO  Replaced by: HYDROcodone-acetaminophen 5-325 mg per tablet     tiZANidine 4 MG tablet  Commonly known as: ZANAFLEX            Indwelling Lines/Drains at time of discharge:   Lines/Drains/Airways     Drain  Duration                Suprapubic Catheter 12/13/22 100% silicone 20 Fr. 39 days          Line  Duration                Subcutaneous Infusion Pump Abdomen (Comment) -- days                Time spent on the discharge of patient: 36 minutes         Nic Mistry MD  Department of Hospital Medicine  [facility]

## 2023-01-23 ENCOUNTER — TELEPHONE (OUTPATIENT)
Dept: INTERNAL MEDICINE | Facility: CLINIC | Age: 68
End: 2023-01-23
Payer: MEDICARE

## 2023-01-23 DIAGNOSIS — G47.30 SLEEP APNEA, UNSPECIFIED TYPE: Primary | ICD-10-CM

## 2023-01-23 DIAGNOSIS — G47.33 OBSTRUCTIVE SLEEP APNEA SYNDROME: ICD-10-CM

## 2023-01-25 NOTE — TELEPHONE ENCOUNTER
----- Message from Kelly Bryson sent at 1/25/2023  8:58 AM CST -----  Contact: pt [phone]  Pt is requesting  refills on her hydrocortisone (CORTEF) 5 MG Tab 10 tablet, hydrocortisone (CORTEF) 10 MG Tab 10 tablet, and corticosteroids 1mg. Pt is using   [facility] [place] - [address] Arvizu - [address] [address]  [address] [facility]  [place] [address]  Phone: [phone] Fax: [phone]. Pt would like to be notified when it has been sent.               Thank you

## 2023-01-25 NOTE — TELEPHONE ENCOUNTER
Care Due:                  Date            Visit Type   Department     Provider  --------------------------------------------------------------------------------                                EP -                              PRIMARY      [facility] INTERNAL  Last Visit: 01-      CARE ([facility])   GAY Hodges                              EP -                              PRIMARY      [facility] INTERNAL  Next Visit: 07-      CARE ([facility])   GAY Hodges                                                            Last  Test          Frequency    Reason                     Performed    Due Date  --------------------------------------------------------------------------------    Lipid Panel.  12 months..  atorvastatin.............  04- 03-    [facility] Embedded Care Gaps. Reference number: [id number]. 1/25/2023   4:35:00 PM CST

## 2023-01-26 ENCOUNTER — DOCUMENT SCAN (OUTPATIENT)
Dept: HOME HEALTH SERVICES | Facility: HOSPITAL | Age: 68
End: 2023-01-26
Payer: MEDICARE

## 2023-01-26 RX ORDER — HYDROCORTISONE 10 MG/1
10 TABLET ORAL DAILY
Qty: 60 TABLET | Refills: 3 | Status: ON HOLD | OUTPATIENT
Start: 2023-01-26 | End: 2023-05-05 | Stop reason: SDUPTHER

## 2023-01-31 ENCOUNTER — TELEPHONE (OUTPATIENT)
Dept: INTERNAL MEDICINE | Facility: CLINIC | Age: 68
End: 2023-01-31
Payer: MEDICARE

## 2023-01-31 NOTE — TELEPHONE ENCOUNTER
----- Message from Talib Archuleta sent at 1/31/2023 12:21 PM CST -----  Regarding: AutoPap  Contact: [phone]  Good afternoon! Please update Rx/order for a valid Dx to [facility], G47.33 per insurance guidelines. Thanks! Jose Carlos

## 2023-02-07 DIAGNOSIS — Z00.00 ENCOUNTER FOR MEDICARE ANNUAL WELLNESS EXAM: ICD-10-CM

## 2023-02-09 DIAGNOSIS — Z00.00 ENCOUNTER FOR MEDICARE ANNUAL WELLNESS EXAM: ICD-10-CM

## 2023-02-10 ENCOUNTER — OFFICE VISIT (OUTPATIENT)
Dept: UROLOGY | Facility: CLINIC | Age: 68
End: 2023-02-10
Payer: MEDICARE

## 2023-02-10 VITALS
WEIGHT: 228 LBS | DIASTOLIC BLOOD PRESSURE: 53 MMHG | HEIGHT: 67 IN | SYSTOLIC BLOOD PRESSURE: 88 MMHG | BODY MASS INDEX: 35.79 KG/M2 | HEART RATE: 66 BPM

## 2023-02-10 DIAGNOSIS — R10.2 SUPRAPUBIC PRESSURE: Primary | ICD-10-CM

## 2023-02-10 DIAGNOSIS — N31.9 NEUROGENIC BLADDER: ICD-10-CM

## 2023-02-10 DIAGNOSIS — Z43.5 ENCOUNTER FOR SUPRAPUBIC CATHETER CARE: ICD-10-CM

## 2023-02-10 PROCEDURE — 87077 CULTURE AEROBIC IDENTIFY: CPT | Mod: HCNC | Performed by: UROLOGY

## 2023-02-10 PROCEDURE — 1111F PR DISCHARGE MEDS RECONCILED W/ CURRENT OUTPATIENT MED LIST: ICD-10-PCS | Mod: HCNC,CPTII,S$GLB, | Performed by: UROLOGY

## 2023-02-10 PROCEDURE — 3008F BODY MASS INDEX DOCD: CPT | Mod: HCNC,CPTII,S$GLB, | Performed by: UROLOGY

## 2023-02-10 PROCEDURE — 1101F PR PT FALLS ASSESS DOC 0-1 FALLS W/OUT INJ PAST YR: ICD-10-PCS | Mod: HCNC,CPTII,S$GLB, | Performed by: UROLOGY

## 2023-02-10 PROCEDURE — 99499 UNLISTED E&M SERVICE: CPT | Mod: HCNC,S$GLB,, | Performed by: UROLOGY

## 2023-02-10 PROCEDURE — 3078F DIAST BP <80 MM HG: CPT | Mod: HCNC,CPTII,S$GLB, | Performed by: UROLOGY

## 2023-02-10 PROCEDURE — 1126F PR PAIN SEVERITY QUANTIFIED, NO PAIN PRESENT: ICD-10-PCS | Mod: HCNC,CPTII,S$GLB, | Performed by: UROLOGY

## 2023-02-10 PROCEDURE — 87186 SC STD MICRODIL/AGAR DIL: CPT | Mod: HCNC | Performed by: UROLOGY

## 2023-02-10 PROCEDURE — 87086 URINE CULTURE/COLONY COUNT: CPT | Mod: HCNC | Performed by: UROLOGY

## 2023-02-10 PROCEDURE — 51705 CHANGE OF BLADDER TUBE: CPT | Mod: HCNC,S$GLB,, | Performed by: UROLOGY

## 2023-02-10 PROCEDURE — 99999 PR PBB SHADOW E&M-EST. PATIENT-LVL III: CPT | Mod: PBBFAC,HCNC,, | Performed by: UROLOGY

## 2023-02-10 PROCEDURE — 51705 PR CHANGE OF BLADDER TUBE,SIMPLE: ICD-10-PCS | Mod: HCNC,S$GLB,, | Performed by: UROLOGY

## 2023-02-10 PROCEDURE — 3288F PR FALLS RISK ASSESSMENT DOCUMENTED: ICD-10-PCS | Mod: HCNC,CPTII,S$GLB, | Performed by: UROLOGY

## 2023-02-10 PROCEDURE — 3074F SYST BP LT 130 MM HG: CPT | Mod: HCNC,CPTII,S$GLB, | Performed by: UROLOGY

## 2023-02-10 PROCEDURE — 1159F PR MEDICATION LIST DOCUMENTED IN MEDICAL RECORD: ICD-10-PCS | Mod: HCNC,CPTII,S$GLB, | Performed by: UROLOGY

## 2023-02-10 PROCEDURE — 1101F PT FALLS ASSESS-DOCD LE1/YR: CPT | Mod: HCNC,CPTII,S$GLB, | Performed by: UROLOGY

## 2023-02-10 PROCEDURE — 3074F PR MOST RECENT SYSTOLIC BLOOD PRESSURE < 130 MM HG: ICD-10-PCS | Mod: HCNC,CPTII,S$GLB, | Performed by: UROLOGY

## 2023-02-10 PROCEDURE — 87088 URINE BACTERIA CULTURE: CPT | Mod: HCNC | Performed by: UROLOGY

## 2023-02-10 PROCEDURE — 3008F PR BODY MASS INDEX (BMI) DOCUMENTED: ICD-10-PCS | Mod: HCNC,CPTII,S$GLB, | Performed by: UROLOGY

## 2023-02-10 PROCEDURE — 3288F FALL RISK ASSESSMENT DOCD: CPT | Mod: HCNC,CPTII,S$GLB, | Performed by: UROLOGY

## 2023-02-10 PROCEDURE — 1126F AMNT PAIN NOTED NONE PRSNT: CPT | Mod: HCNC,CPTII,S$GLB, | Performed by: UROLOGY

## 2023-02-10 PROCEDURE — 1111F DSCHRG MED/CURRENT MED MERGE: CPT | Mod: HCNC,CPTII,S$GLB, | Performed by: UROLOGY

## 2023-02-10 PROCEDURE — 99999 PR PBB SHADOW E&M-EST. PATIENT-LVL III: ICD-10-PCS | Mod: PBBFAC,HCNC,, | Performed by: UROLOGY

## 2023-02-10 PROCEDURE — 3078F PR MOST RECENT DIASTOLIC BLOOD PRESSURE < 80 MM HG: ICD-10-PCS | Mod: HCNC,CPTII,S$GLB, | Performed by: UROLOGY

## 2023-02-10 PROCEDURE — 1159F MED LIST DOCD IN RCRD: CPT | Mod: HCNC,CPTII,S$GLB, | Performed by: UROLOGY

## 2023-02-10 PROCEDURE — 99499 NO LOS: ICD-10-PCS | Mod: HCNC,S$GLB,, | Performed by: UROLOGY

## 2023-02-10 NOTE — PROGRESS NOTES
CHIEF COMPLAINT:    Mrs. Hawley is a 67 y.o. female presenting for an urgent appointment for suprapubic pressure.     PRESENTING ILLNESS:    Tasha Hawley is a 67 y.o. female who is presents with a history of neurogenic bladder with incomplete emptying managed with a suprapubic tube.  She was supposed to have follow up in the department but she cancelled the appointment.  She states she has suprapubic pressure.  She is due to have the suprapubic tube changed next week.  No fevers, chills or malaise.  She does spend a good bit of time in bed per her .  When this happens she diureses as she has chronic lower extremity edema. She also has some incontinence per urethra.  Some days are better than others.     Suprapubic tube was placed in 2016  Cystoscopy Botox injection of the bladder and bladder biopsy 1/26/2017  Cystoscopy Botox injection and injection of Macroplastique 3/20/2018  Autologous fascial sling and cystoscopy, cystolitholapaxy 9/3/2019  Cystoscopy Botox injection of the bladder 10/31/2019  Cystoscopy Botox injection of the bladder 6/23/2020  Cystoscopy Botox injection of the bladder 12/15/2020  Cystoscopy Botox injection of the bladder 7/13/2021  Cystoscopy Botox injection of the bladder 11/16/2021  Cystoscopy Botox injection of the bladder and injection of Macroplastique 7/19/2022  Cystoscopy Botox injection of the bladder 12/13/2022      REVIEW OF SYSTEMS:    Review of Systems   Constitutional: Negative.    HENT: Negative.     Eyes: Negative.    Respiratory: Negative.     Cardiovascular:  Positive for leg swelling.   Gastrointestinal: Negative.    Genitourinary:         Periodic suprapubic tenderness   Musculoskeletal: Negative.    Skin: Negative.    Neurological: Negative.    Endo/Heme/Allergies: Negative.    Psychiatric/Behavioral: Negative.       PATIENT HISTORY:    Past Medical History:   Diagnosis Date    Anticoagulant long-term use     Anxiety     Arthritis     Bilateral lower  extremity edema     severe chronic    Carotid artery occlusion     Cataract     CHF (congestive heart failure)     Coronary artery disease     subtotalled LAD with collateral    Depression     Fever blister     Hard of hearing     Hypokalemia 1/9/2023    Hyponatremia 2/4/2022    Hypothyroid     Iron deficiency anemia     Lumbar radiculopathy     with chronic pain    Ocular migraine     Renal disorder     Sleep apnea     cpap       Past Surgical History:   Procedure Laterality Date    ADENOIDECTOMY      BACK SURGERY      x 12    CARDIAC CATHETERIZATION  2016    subtotalled LAD with right to left collaterals    CATARACT EXTRACTION W/  INTRAOCULAR LENS IMPLANT Left     Dr Coleman     CYSTOSCOPIC LITHOLAPAXY N/A 6/27/2019    Procedure: CYSTOLITHOLAPAXY;  Surgeon: Shireen Mayo MD;  Location: [facility] OR 2ND FLR;  Service: Urology;  Laterality: N/A;    CYSTOSCOPIC LITHOLAPAXY N/A 9/3/2019    Procedure: CYSTOLITHOLAPAXY;  Surgeon: Shireen Mayo MD;  Location: [facility] OR 2ND FLR;  Service: Urology;  Laterality: N/A;    CYSTOSCOPY N/A 7/13/2021    Procedure: CYSTOSCOPY;  Surgeon: Shireen Mayo MD;  [facility];  Service: Urology;  Laterality: N/A;    CYSTOSCOPY  11/16/2021    Procedure: CYSTOSCOPY;  Surgeon: Shireen Mayo MD;  [facility];  Service: Urology;;    CYSTOSCOPY  7/19/2022    Procedure: CYSTOSCOPY;  Surgeon: Shireen Mayo MD;  [facility];  Service: Urology;;    CYSTOSCOPY WITH INJECTION OF PERIURETHRAL BULKING AGENT  7/19/2022    Procedure: CYSTOSCOPY, WITH PERIURETHRAL BULKING AGENT INJECTION-MACROPLASTIQUE;  Surgeon: Shireen Mayo MD;  [facility];  Service: Urology;;    CYSTOSCOPY,WITH BOTULINUM TOXIN INJECTION N/A 12/13/2022    Procedure: CYSTOSCOPY,WITH BOTULINUM TOXIN INJECTION;  Surgeon: Shireen Mayo MD;  [facility];  Service: Urology;  Laterality: N/A;  300 U    ESOPHAGOGASTRODUODENOSCOPY N/A 5/23/2018     Procedure: ESOPHAGOGASTRODUODENOSCOPY (EGD);  Surgeon: Prince Vance MD;  Location: [facility] (4TH FLR);  Service: Endoscopy;  Laterality: N/A;  r/s 'd per Dr. Vance due to family emergency- ER    HYSTERECTOMY  1975    endometriosis    INJECTION OF BOTULINUM TOXIN TYPE A  7/13/2021    Procedure: INJECTION, BOTULINUM TOXIN, 200units;  Surgeon: Shireen Mayo MD;  [facility];  Service: Urology;;    INJECTION OF BOTULINUM TOXIN TYPE A  11/16/2021    Procedure: INJECTION, BOTULINUM TOXIN, 200units;  Surgeon: Shireen Mayo MD;  [facility];  Service: Urology;;    INJECTION OF BOTULINUM TOXIN TYPE A  7/19/2022    Procedure: INJECTION, BOTULINUM TOXIN, 300 units ;  Surgeon: Shireen Mayo MD;  [facility];  Service: Urology;;    INSERTION, SUPRAPUBIC CATHETER N/A 12/13/2022    Procedure: INSERTION, SUPRAPUBIC CATHETER;  Surgeon: Shireen Mayo MD;  [facility];  Service: Urology;  Laterality: N/A;  exchange    pain pump placement      SQ Dilaudid Pump managed by Dr. Hillman, Pain Management    REMOVAL OF BONE SPUR OF FOOT Bilateral 9/16/2022    Procedure: EXCISION ARTHRITIC BONE, BILATERAL FOOT;  Surgeon: Adam Mcguire [facility];  Location: [facility];  Service: Podiatry;  Laterality: Bilateral;    REPLACEMENT OF CATHETER N/A 10/31/2019    Procedure: REPLACEMENT, CATHETER-SUPRAPUBIC;  Surgeon: Shireen Mayo MD;  [facility];  Service: Urology;  Laterality: N/A;    SPINAL CORD STIMULATOR REMOVAL      before [facility]    SPINE SURGERY  5-13-13    CERVICAL FUSION    TONSILLECTOMY         Family History   Problem Relation Age of Onset    Cancer Mother 55        breast    Cancer Father         esophagus,had laryngectomy    Esophageal cancer Father     Parkinsonism Maternal Grandmother     Tremor Maternal Grandmother     No Known Problems Brother     No Known Problems Brother     Heart disease Maternal Uncle     Colon cancer Maternal  Uncle         Less than 60    No Known Problems Sister     No Known Problems Maternal Aunt     Cirrhosis Paternal Aunt         ETOH    Liver disease Paternal Aunt         ETOH    Liver disease Paternal Uncle         ETOH    Cirrhosis Paternal Uncle         ETOH     Social History     Socioeconomic History    Marital status:    Tobacco Use    Smoking status: Never    Smokeless tobacco: Never   Substance and Sexual Activity    Alcohol use: Never    Drug use: No    Sexual activity: Never     Partners: Male     Social Determinants of Health     Financial Resource Strain: Low Risk     Difficulty of Paying Living Expenses: Not hard at all   Food Insecurity: No Food Insecurity    Worried About Running Out of Food in the Last Year: Never true    Ran Out of Food in the Last Year: Never true   Transportation Needs: Unmet Transportation Needs    Lack of Transportation (Medical): Yes    Lack of Transportation (Non-Medical): Yes   Physical Activity: Inactive    Days of Exercise per Week: 0 days    Minutes of Exercise per Session: 0 min   Stress: Stress Concern Present    Feeling of Stress : Rather much   Social Connections: Moderately Integrated    Frequency of Communication with Friends and Family: More than three times a week    Frequency of Social Gatherings with Friends and Family: More than three times a week    Attends Christianity Services: Never    Active Member of Clubs or Organizations: Yes    Attends Club or Organization Meetings: Never    Marital Status:    Housing Stability: Low Risk     Unable to Pay for Housing in the Last Year: No    Number of Places Lived in the Last Year: 1    Unstable Housing in the Last Year: No       Allergies:  (d)-limonene flavor; Bactrim [sulfamethoxazole-trimethoprim]; Benadryl [diphenhydramine hcl]; Fentanyl; Imitrex [sumatriptan succinate]; Percocet [oxycodone-acetaminophen]; Topamax [topiramate]; Vancomycin; Butorphanol tartrate; Darvocet a500 [propoxyphene  n-acetaminophen]; Evening primrose (oenothera biennis); White petrolatum-zinc; Zinc oxide-white petrolatum; Latex, natural rubber; and Phenytoin    Medications:  Outpatient Encounter Medications as of 2/10/2023   Medication Sig Dispense Refill    aspirin (ECOTRIN) 81 MG EC tablet Take 1 tablet (81 mg total) by mouth once daily. 30 tablet 0    atorvastatin (LIPITOR) 80 MG tablet TAKE ONE TABLET BY MOUTH EVERY DAY 90 tablet 3    butalbital-acetaminophen-caffeine -40 mg (FIORICET, ESGIC) -40 mg per tablet Take 1 tablet by mouth daily as needed. 15 tablet 0    cephALEXin (KEFLEX) 500 MG capsule Take 1 capsule (500 mg total) by mouth 4 (four) times daily. End 1/25/23      ferrous sulfate 325 (65 FE) MG EC tablet Take 1 tablet (325 mg total) by mouth every other day.      fludrocortisone (FLORINEF) 0.1 mg Tab Take 1 tablet (100 mcg total) by mouth once daily. 30 tablet 0    FLUoxetine 20 MG capsule Take 3 capsules (60 mg total) by mouth once daily. 270 capsule 3    furosemide (LASIX) 40 MG tablet Take 1 tablet (40 mg total) by mouth once daily. 90 tablet 3    HYDROcodone-acetaminophen (NORCO) 5-325 mg per tablet Take 1 tablet by mouth every 8 (eight) hours as needed (breakthrough). 10 tablet 0    hydrocortisone (CORTEF) 10 MG Tab Take 1 tablet (10 mg total) by mouth once daily. 60 tablet 3    hydrocortisone (CORTEF) 5 MG Tab Take 1 tablet (5 mg total) by mouth every evening. 10 tablet 0    intrathecal pain pump compound Hydromorphone (7.5 mg/mL) infusion at 3.6799 mg/day (0.1533 mg/hr) out of a total reservoir volume of 37.3 mL  Pump filled every 2 months      levothyroxine (SYNTHROID) 137 MCG Tab tablet Take 1 tablet (137 mcg total) by mouth before breakfast. 90 tablet 3    LIDOcaine (LIDODERM) 5 % Place 1 patch onto the skin once daily. Remove & Discard patch within 12 hours or as directed by MD  0    pantoprazole (PROTONIX) 40 MG tablet Take 1 tablet (40 mg total) by mouth once daily. 90 tablet 3     potassium chloride SA (K-DUR,KLOR-CON) 20 MEQ tablet Take 1 tablet (20 mEq total) by mouth 2 (two) times daily. 180 tablet 3    promethazine (PHENERGAN) 25 MG tablet Take 25 mg by mouth every 6 (six) hours as needed for Nausea.      QUEtiapine (SEROQUEL) 100 MG Tab TAKE 2 TABLETS (200 MG) BY MOUTH NIGHTLY 180 tablet 3    senna (SENNA LAX) 8.6 mg tablet Take 2 tablets by mouth 2 (two) times daily.      traZODone (DESYREL) 300 MG tablet Take 1 tablet (300 mg total) by mouth every evening. 90 tablet 0    nitroGLYCERIN (NITROSTAT) 0.4 MG SL tablet Place 1 tablet (0.4 mg total) under the tongue every 5 (five) minutes as needed for Chest pain. 25 tablet 3     No facility-administered encounter medications on file as of 2/10/2023.         PHYSICAL EXAMINATION:    The patient generally appears in good health, is appropriately interactive, and is in no apparent distress.    Skin: No lesions.    Mental: Cooperative with normal affect.    Neuro: Grossly intact.    HEENT: Normal. No evidence of lymphadenopathy.    Chest:  normal inspiratory effort.    Abdomen: Soft, non-tender. No masses or organomegaly. Bladder is not palpable. No evidence of flank discomfort. No evidence of inguinal hernia.  The suprapubic tube site is CDI.      Extremities: No clubbing, cyanosis, or edema      LABS:    Lab Results   Component Value Date    BUN 9 01/18/2023    CREATININE 0.9 01/18/2023     Renal ultrasound on 4/16/2022 showed no stones, masses or hydronephrosis    IMPRESSION:    Incomplete bladder emptying from neurogenic bladder  Suprapubic tube    PLAN:    1. Removed the 20 Fr suprpaubic tube with 30 ml and prepped the patient with Povidone iodine  2.  A new siilicone 20 Fr suprapubic tube was placed, 30 ml sterile water in the bladder.  Stat lock from medline was placed.  Regular meadows bag provided  3.  Urine sent for culture    I spent 40 minutes with the patient of which more than half was spent in direct consultation with the patient in  regards to our treatment and plan.

## 2023-02-12 LAB — BACTERIA UR CULT: ABNORMAL

## 2023-02-13 ENCOUNTER — TELEPHONE (OUTPATIENT)
Dept: UROLOGY | Facility: CLINIC | Age: 68
End: 2023-02-13
Payer: MEDICARE

## 2023-02-13 DIAGNOSIS — Z88.2: Primary | ICD-10-CM

## 2023-02-13 DIAGNOSIS — N39.41 URGE INCONTINENCE: ICD-10-CM

## 2023-02-13 RX ORDER — MIRABEGRON 50 MG/1
50 TABLET, FILM COATED, EXTENDED RELEASE ORAL DAILY
Qty: 30 TABLET | Refills: 11 | Status: ON HOLD | OUTPATIENT
Start: 2023-02-13 | End: 2023-03-28 | Stop reason: SDUPTHER

## 2023-02-13 NOTE — TELEPHONE ENCOUNTER
----- Message from Sharona Batista MA sent at 2/13/2023  2:55 PM CST -----  Regarding: C/o leakage  Contact: [phone]  Patient complain of leakage severely. Please call and advise the patient.

## 2023-02-15 NOTE — TELEPHONE ENCOUNTER
----- Message from Marycruz Glover sent at 2/15/2023 12:25 PM CST -----  Regarding: Leaking and Abdominal Pressure  Contact: pt @ [phone]  Message is for Christi, pt is leaking really bad and having abdominal pressure. Asking for an urgent call back

## 2023-02-15 NOTE — TELEPHONE ENCOUNTER
----- Message from Shannon Carrasquillo RN sent at 2/15/2023  2:55 PM CST -----  Contact: @[phone]    ----- Message -----  From: Philly Ross  Sent: 2/15/2023   1:55 PM CST  To: [facility] Staff    Pt is calling in stating that she is having leakage and severe pelvic pain and would like to discuss Botox, please call to discuss further.

## 2023-02-16 ENCOUNTER — TELEPHONE (OUTPATIENT)
Dept: GASTROENTEROLOGY | Facility: CLINIC | Age: 68
End: 2023-02-16
Payer: MEDICARE

## 2023-02-16 NOTE — TELEPHONE ENCOUNTER
Return call and spoke with the patient. Inform patient of Dr. Vance schedule. Patient declined to schedule with the Fellow Doctor and with the NP. Patient stated that she will wait for Dr. Vance.     Patient verbalized understanding.

## 2023-02-16 NOTE — TELEPHONE ENCOUNTER
----- Message from Philly Ross sent at 2/16/2023  2:57 PM CST -----  Contact: @[phone]  Pt is calling in to get an appt due to having trouble swallowing, please call to discuss further.

## 2023-02-20 PROCEDURE — G0179 PR HOME HEALTH MD RECERTIFICATION: ICD-10-PCS | Mod: ,,, | Performed by: UROLOGY

## 2023-02-20 PROCEDURE — G0179 MD RECERTIFICATION HHA PT: HCPCS | Mod: ,,, | Performed by: UROLOGY

## 2023-02-23 ENCOUNTER — TELEPHONE (OUTPATIENT)
Dept: UROLOGY | Facility: CLINIC | Age: 68
End: 2023-02-23
Payer: MEDICARE

## 2023-02-23 NOTE — TELEPHONE ENCOUNTER
"Pt c/o bladder pressure and leaking. Spoke w/ pt and reiterated what was told her last week on 2/15/2023 about starting Myrbetriq (states she remembers). She did not get an appt for allergy testing for Bactrim so I gave her the phone number to call. Does not think medication (Myrbetriq) is helping. Notified that it takes 3-4 weeks for Myrbetriq to work and she is not due botox until after 3/13/2023. Pt verbalized understanding, stating, "I guess I just have to give it time."  "
----- Message from Margo Parmar sent at 2/23/2023 12:37 PM CST -----  Contact: pt  Pt states she has been trying to contact provider for weeks and has not had a call back. Pt states that she did not speak to someone in the office on 2/15 as the notes in the chart indicate and is requesting to speak to someone as soon as possible.     Confirmed contact below:  Contact Name:Tasha Hawley  Phone Number: [phone]          
----- Message from Segundotherese Mal sent at 2/23/2023 11:17 AM CST -----  Contact: Patient  Type:  Patient Call          Who Called: patient         Does the patient know what this is regarding?: Requesting a callback to ; pt said she has been calling for two weeks and she never received a call back ; Pt said that she have a infection that's not getting better ; please advise          Would the patient rather a call back or a response via [facility]? Call           Best Call Back Number: [phone] or [phone]             Additional Information:        
23

## 2023-02-24 ENCOUNTER — EXTERNAL HOME HEALTH (OUTPATIENT)
Dept: HOME HEALTH SERVICES | Facility: HOSPITAL | Age: 68
End: 2023-02-24
Payer: MEDICARE

## 2023-03-01 ENCOUNTER — TELEPHONE (OUTPATIENT)
Dept: UROLOGY | Facility: CLINIC | Age: 68
End: 2023-03-01
Payer: MEDICARE

## 2023-03-01 NOTE — TELEPHONE ENCOUNTER
----- Message from Morenita Whitlock sent at 3/1/2023  1:12 PM CST -----  Regarding: pt concern  Name of Who is Calling:JUDIE YO [[id number]]          What is the request in detail: pt is in pain and cant keep her diaper dry          Can the clinic reply by [facility]: no           [facility] [phone] (home)

## 2023-03-03 ENCOUNTER — OFFICE VISIT (OUTPATIENT)
Dept: DERMATOLOGY | Facility: CLINIC | Age: 68
End: 2023-03-03
Payer: MEDICARE

## 2023-03-03 DIAGNOSIS — L72.0 EIC (EPIDERMAL INCLUSION CYST): Primary | ICD-10-CM

## 2023-03-03 PROCEDURE — 1160F PR REVIEW ALL MEDS BY PRESCRIBER/CLIN PHARMACIST DOCUMENTED: ICD-10-PCS | Mod: HCNC,CPTII,S$GLB, | Performed by: DERMATOLOGY

## 2023-03-03 PROCEDURE — 99202 PR OFFICE/OUTPT VISIT, NEW, LEVL II, 15-29 MIN: ICD-10-PCS | Mod: HCNC,S$GLB,, | Performed by: DERMATOLOGY

## 2023-03-03 PROCEDURE — 99202 OFFICE O/P NEW SF 15 MIN: CPT | Mod: HCNC,S$GLB,, | Performed by: DERMATOLOGY

## 2023-03-03 PROCEDURE — 1125F AMNT PAIN NOTED PAIN PRSNT: CPT | Mod: HCNC,CPTII,S$GLB, | Performed by: DERMATOLOGY

## 2023-03-03 PROCEDURE — 99999 PR PBB SHADOW E&M-EST. PATIENT-LVL III: ICD-10-PCS | Mod: PBBFAC,HCNC,, | Performed by: DERMATOLOGY

## 2023-03-03 PROCEDURE — 1159F MED LIST DOCD IN RCRD: CPT | Mod: HCNC,CPTII,S$GLB, | Performed by: DERMATOLOGY

## 2023-03-03 PROCEDURE — 1100F PTFALLS ASSESS-DOCD GE2>/YR: CPT | Mod: HCNC,CPTII,S$GLB, | Performed by: DERMATOLOGY

## 2023-03-03 PROCEDURE — 1125F PR PAIN SEVERITY QUANTIFIED, PAIN PRESENT: ICD-10-PCS | Mod: HCNC,CPTII,S$GLB, | Performed by: DERMATOLOGY

## 2023-03-03 PROCEDURE — 1160F RVW MEDS BY RX/DR IN RCRD: CPT | Mod: HCNC,CPTII,S$GLB, | Performed by: DERMATOLOGY

## 2023-03-03 PROCEDURE — 99999 PR PBB SHADOW E&M-EST. PATIENT-LVL III: CPT | Mod: PBBFAC,HCNC,, | Performed by: DERMATOLOGY

## 2023-03-03 PROCEDURE — 3288F PR FALLS RISK ASSESSMENT DOCUMENTED: ICD-10-PCS | Mod: HCNC,CPTII,S$GLB, | Performed by: DERMATOLOGY

## 2023-03-03 PROCEDURE — 3288F FALL RISK ASSESSMENT DOCD: CPT | Mod: HCNC,CPTII,S$GLB, | Performed by: DERMATOLOGY

## 2023-03-03 PROCEDURE — 1100F PR PT FALLS ASSESS DOC 2+ FALLS/FALL W/INJURY/YR: ICD-10-PCS | Mod: HCNC,CPTII,S$GLB, | Performed by: DERMATOLOGY

## 2023-03-03 PROCEDURE — 1159F PR MEDICATION LIST DOCUMENTED IN MEDICAL RECORD: ICD-10-PCS | Mod: HCNC,CPTII,S$GLB, | Performed by: DERMATOLOGY

## 2023-03-03 NOTE — TELEPHONE ENCOUNTER
----- Message from Marycruz Glover sent at 3/3/2023 11:31 AM CST -----  Regarding: Leakage  Contact: pt @ [phone]  Pt is calling to speak with nurse, she is having really bad leakage. She went thru 17 diapers in 2 days. Asking for an urgent call back
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
## 2023-03-06 NOTE — TELEPHONE ENCOUNTER
----- Message from Cora Garcia sent at 3/6/2023 10:46 AM CST -----  Regarding: Pt Advice  Contact: [phone]  Pt is requesting a call back from Dr Mayo or nurse because she is still urinating a lot and would like a call back.

## 2023-03-07 ENCOUNTER — TELEPHONE (OUTPATIENT)
Dept: UROLOGY | Facility: CLINIC | Age: 68
End: 2023-03-07
Payer: MEDICARE

## 2023-03-07 DIAGNOSIS — N31.9 NEUROGENIC BLADDER: Primary | ICD-10-CM

## 2023-03-07 DIAGNOSIS — N39.3 STRESS INCONTINENCE: ICD-10-CM

## 2023-03-07 NOTE — TELEPHONE ENCOUNTER
----- Message from Jane Glover sent at 3/7/2023  9:17 AM CST -----  Regarding: Call Back  Contact: [phone]  Pt requesting call back in regards to a infection.  Please call and adv @ [phone]

## 2023-03-08 ENCOUNTER — TELEPHONE (OUTPATIENT)
Dept: UROLOGY | Facility: CLINIC | Age: 68
End: 2023-03-08
Payer: MEDICARE

## 2023-03-08 NOTE — TELEPHONE ENCOUNTER
Called Ose[facility]  at [phone], spoke w/ Rigoberto. Gave v/o for SN to assess pt for c/o hematuria and bladder infection today or tomorrow(3/9/2023), urine to be collected from new catheter.     Gave v/o to change sptube and collect urine for c&s no later than 3/20/2023 prior to procedure on 3/28/2023

## 2023-03-08 NOTE — TELEPHONE ENCOUNTER
----- Message from Jeaneth Sen sent at 3/8/2023  2:56 PM CST -----  Regarding: Pt Advice  Contact: Brenna [phone]  Brenna with [facility] is calling would like to inform provider that Pt is out of town and is requesting to come in on 3/10. Please Call [phone]

## 2023-03-16 ENCOUNTER — TELEPHONE (OUTPATIENT)
Dept: UROLOGY | Facility: CLINIC | Age: 68
End: 2023-03-16
Payer: MEDICARE

## 2023-03-16 NOTE — TELEPHONE ENCOUNTER
----- Message from Bibi Mueller sent at 3/16/2023 10:18 AM CDT -----  López / home health calling regarding Patient Advice for wanting to know if the PT is supposed to be on an antibiotic for the positive culture on the urine sample, call back to inform [phone]

## 2023-03-17 NOTE — TELEPHONE ENCOUNTER
----- Message from Jazmin Vazquez CMA sent at 3/17/2023  3:44 PM CDT -----  Regarding: Urgent please call  Contact: [phone]  Pt and Tasha (sister) called on three-way regarding antibiotics. Please call pt    Pt request prescriptions be sent to [facility], [phone]. She is out of town .    Thank you

## 2023-03-17 NOTE — TELEPHONE ENCOUNTER
----- Message from Jane Sanchez sent at 3/17/2023 12:52 PM CDT -----  Regarding: pt advice  Contact: [phone]  López with home health calling requesting to advised on  PT care plan for infection. Caller asking if PT needing any antibiotic for the positive culture on the urine sample.

## 2023-03-20 ENCOUNTER — HOSPITAL ENCOUNTER (EMERGENCY)
Facility: HOSPITAL | Age: 68
Discharge: HOME | End: 2023-03-20
Attending: EMERGENCY MEDICINE
Payer: MEDICARE

## 2023-03-20 VITALS
SYSTOLIC BLOOD PRESSURE: 110 MMHG | TEMPERATURE: 98 F | DIASTOLIC BLOOD PRESSURE: 62 MMHG | RESPIRATION RATE: 16 BRPM | OXYGEN SATURATION: 94 % | HEART RATE: 72 BPM

## 2023-03-20 DIAGNOSIS — N30.01 ACUTE CYSTITIS WITH HEMATURIA: ICD-10-CM

## 2023-03-20 DIAGNOSIS — R00.1 BRADYCARDIA: ICD-10-CM

## 2023-03-20 DIAGNOSIS — R10.9 FLANK PAIN: Primary | ICD-10-CM

## 2023-03-20 LAB
ALBUMIN SERPL BCP-MCNC: 3.2 G/DL (ref 3.5–5.2)
ALP SERPL-CCNC: 113 U/L (ref 55–135)
ALT SERPL W/O P-5'-P-CCNC: 8 U/L (ref 10–44)
AMORPH CRY UR QL COMP ASSIST: ABNORMAL
ANION GAP SERPL CALC-SCNC: 11 MMOL/L (ref 8–16)
AST SERPL-CCNC: 17 U/L (ref 10–40)
BACTERIA #/AREA URNS AUTO: ABNORMAL /HPF
BASOPHILS # BLD AUTO: 0.02 K/UL (ref 0–0.2)
BASOPHILS NFR BLD: 0.3 % (ref 0–1.9)
BILIRUB SERPL-MCNC: 0.3 MG/DL (ref 0.1–1)
BILIRUB UR QL STRIP: NEGATIVE
BNP SERPL-MCNC: 44 PG/ML (ref 0–99)
BUN SERPL-MCNC: 6 MG/DL (ref 8–23)
CALCIUM SERPL-MCNC: 9 MG/DL (ref 8.7–10.5)
CHLORIDE SERPL-SCNC: 97 MMOL/L (ref 95–110)
CLARITY UR REFRACT.AUTO: ABNORMAL
CO2 SERPL-SCNC: 30 MMOL/L (ref 23–29)
COLOR UR AUTO: YELLOW
CREAT SERPL-MCNC: 1 MG/DL (ref 0.5–1.4)
DIFFERENTIAL METHOD: ABNORMAL
EOSINOPHIL # BLD AUTO: 0.2 K/UL (ref 0–0.5)
EOSINOPHIL NFR BLD: 2.9 % (ref 0–8)
ERYTHROCYTE [DISTWIDTH] IN BLOOD BY AUTOMATED COUNT: 16 % (ref 11.5–14.5)
EST. GFR  (NO RACE VARIABLE): >60 ML/MIN/1.73 M^2
GLUCOSE SERPL-MCNC: 101 MG/DL (ref 70–110)
GLUCOSE UR QL STRIP: NEGATIVE
HCT VFR BLD AUTO: 30.8 % (ref 37–48.5)
HCV AB SERPL QL IA: NORMAL
HGB BLD-MCNC: 9.3 G/DL (ref 12–16)
HGB UR QL STRIP: ABNORMAL
HIV 1+2 AB+HIV1 P24 AG SERPL QL IA: NORMAL
HYALINE CASTS UR QL AUTO: 3 /LPF
IMM GRANULOCYTES # BLD AUTO: 0.01 K/UL (ref 0–0.04)
IMM GRANULOCYTES NFR BLD AUTO: 0.1 % (ref 0–0.5)
KETONES UR QL STRIP: NEGATIVE
LACTATE SERPL-SCNC: 1.1 MMOL/L (ref 0.5–2.2)
LEUKOCYTE ESTERASE UR QL STRIP: ABNORMAL
LYMPHOCYTES # BLD AUTO: 1 K/UL (ref 1–4.8)
LYMPHOCYTES NFR BLD: 13.6 % (ref 18–48)
MAGNESIUM SERPL-MCNC: 1.8 MG/DL (ref 1.6–2.6)
MCH RBC QN AUTO: 25.5 PG (ref 27–31)
MCHC RBC AUTO-ENTMCNC: 30.2 G/DL (ref 32–36)
MCV RBC AUTO: 85 FL (ref 82–98)
MICROSCOPIC COMMENT: ABNORMAL
MONOCYTES # BLD AUTO: 0.6 K/UL (ref 0.3–1)
MONOCYTES NFR BLD: 7.8 % (ref 4–15)
NEUTROPHILS # BLD AUTO: 5.4 K/UL (ref 1.8–7.7)
NEUTROPHILS NFR BLD: 75.3 % (ref 38–73)
NITRITE UR QL STRIP: POSITIVE
NRBC BLD-RTO: 0 /100 WBC
PH UR STRIP: 7 [PH] (ref 5–8)
PLATELET # BLD AUTO: 247 K/UL (ref 150–450)
PMV BLD AUTO: 9.5 FL (ref 9.2–12.9)
POTASSIUM SERPL-SCNC: 3.7 MMOL/L (ref 3.5–5.1)
PROT SERPL-MCNC: 7 G/DL (ref 6–8.4)
PROT UR QL STRIP: ABNORMAL
RBC # BLD AUTO: 3.64 M/UL (ref 4–5.4)
RBC #/AREA URNS AUTO: 30 /HPF (ref 0–4)
SODIUM SERPL-SCNC: 138 MMOL/L (ref 136–145)
SP GR UR STRIP: 1.01 (ref 1–1.03)
TROPONIN I SERPL DL<=0.01 NG/ML-MCNC: <0.006 NG/ML (ref 0–0.03)
URN SPEC COLLECT METH UR: ABNORMAL
WBC # BLD AUTO: 7.21 K/UL (ref 3.9–12.7)
WBC #/AREA URNS AUTO: >100 /HPF (ref 0–5)
WBC CLUMPS UR QL AUTO: ABNORMAL

## 2023-03-20 PROCEDURE — 84484 ASSAY OF TROPONIN QUANT: CPT

## 2023-03-20 PROCEDURE — 86803 HEPATITIS C AB TEST: CPT | Performed by: PHYSICIAN ASSISTANT

## 2023-03-20 PROCEDURE — 27000221 HC OXYGEN, UP TO 24 HOURS

## 2023-03-20 PROCEDURE — 81001 URINALYSIS AUTO W/SCOPE: CPT

## 2023-03-20 PROCEDURE — 99285 EMERGENCY DEPT VISIT HI MDM: CPT | Mod: 25

## 2023-03-20 PROCEDURE — 93010 EKG 12-LEAD: ICD-10-PCS | Mod: ,,, | Performed by: INTERNAL MEDICINE

## 2023-03-20 PROCEDURE — 99285 PR EMERGENCY DEPT VISIT,LEVEL V: ICD-10-PCS | Mod: ,,, | Performed by: EMERGENCY MEDICINE

## 2023-03-20 PROCEDURE — 87389 HIV-1 AG W/HIV-1&-2 AB AG IA: CPT | Performed by: PHYSICIAN ASSISTANT

## 2023-03-20 PROCEDURE — 93010 ELECTROCARDIOGRAM REPORT: CPT | Mod: ,,, | Performed by: INTERNAL MEDICINE

## 2023-03-20 PROCEDURE — 80053 COMPREHEN METABOLIC PANEL: CPT

## 2023-03-20 PROCEDURE — 93005 ELECTROCARDIOGRAM TRACING: CPT

## 2023-03-20 PROCEDURE — 87040 BLOOD CULTURE FOR BACTERIA: CPT

## 2023-03-20 PROCEDURE — 25000003 PHARM REV CODE 250

## 2023-03-20 PROCEDURE — 85025 COMPLETE CBC W/AUTO DIFF WBC: CPT

## 2023-03-20 PROCEDURE — 25000003 PHARM REV CODE 250: Performed by: EMERGENCY MEDICINE

## 2023-03-20 PROCEDURE — 99285 EMERGENCY DEPT VISIT HI MDM: CPT | Mod: ,,, | Performed by: EMERGENCY MEDICINE

## 2023-03-20 PROCEDURE — 87086 URINE CULTURE/COLONY COUNT: CPT

## 2023-03-20 PROCEDURE — 83605 ASSAY OF LACTIC ACID: CPT

## 2023-03-20 PROCEDURE — 96360 HYDRATION IV INFUSION INIT: CPT

## 2023-03-20 PROCEDURE — 83880 ASSAY OF NATRIURETIC PEPTIDE: CPT | Performed by: EMERGENCY MEDICINE

## 2023-03-20 PROCEDURE — 83735 ASSAY OF MAGNESIUM: CPT

## 2023-03-20 PROCEDURE — 94761 N-INVAS EAR/PLS OXIMETRY MLT: CPT

## 2023-03-20 RX ORDER — HYDROCODONE BITARTRATE AND ACETAMINOPHEN 5; 325 MG/1; MG/1
1 TABLET ORAL
Status: COMPLETED | OUTPATIENT
Start: 2023-03-20 | End: 2023-03-20

## 2023-03-20 RX ORDER — DICLOXACILLIN SODIUM 500 MG/1
500 CAPSULE ORAL 4 TIMES DAILY
Qty: 32 CAPSULE | Refills: 0 | Status: ON HOLD | OUTPATIENT
Start: 2023-03-20 | End: 2023-04-03 | Stop reason: HOSPADM

## 2023-03-20 RX ADMIN — HYDROCODONE BITARTRATE AND ACETAMINOPHEN 1 TABLET: 5; 325 TABLET ORAL at 06:03

## 2023-03-20 RX ADMIN — SODIUM CHLORIDE 250 ML: 9 INJECTION, SOLUTION INTRAVENOUS at 04:03

## 2023-03-20 NOTE — ED NOTES
Attempt to contact patient family for ride home, RN unsuccessful. Charge made aware. Will set up with wheelchair van. Pt needs assistance to walk, not LYFT appropriate.

## 2023-03-20 NOTE — CONSULTS
[facility] Emergency Dept  Urology  Consult Note    Patient Name: Tasha Hawley  MRN: [id number]  Admission Date: 3/20/2023  Hospital Length of Stay: 0   Code Status: Prior   Attending Provider: Maryam Alexander MD   Consulting Provider: Violeta Osorio MD  Primary Care Physician: Mesfin Hodges Ii, MD  Principal Problem:<principal problem not specified>    Consults    Subjective:     HPI:  Tasha Hawley is a 67 y.o. female with a history of neurogenic bladder managed with a suprapubic tube and regular botox injections with Dr. Mayo.  She presented to the ED with subjective fevers/chills, suprapubic pain, and bilateral flank pain L>R for the past three days.  She reports being told she needs IV abx for a recent + urine cx and upcoming botox treatment scheduled 3/28/23.  She has a history of recurrent MDR staph UTIs.  Her SP tube was last exchanged by Dr. Mayo on 2/10/23.       On assessment, she is AFVSS.  UA + nitrites, 30 RBCs, > 100 WBCs, occasional bacteria.  Cr 1.0, WBC 7.2, lactate 1.1.    SP tube in appropriate position and draining c/y/u.  Urine cx from 3/10 growing staph sensitive to oxacillin and bactrim.  No new  imaging available for review.    Of note, patient is allergic to bactrim (anaphylaxis) and reports a hx of allergy to penicillin but says she was recently told she no longer had an allergy.        Past Medical History:   Diagnosis Date    Anticoagulant long-term use     Anxiety     Arthritis     Bilateral lower extremity edema     severe chronic    Carotid artery occlusion     Cataract     CHF (congestive heart failure)     Coronary artery disease     subtotalled LAD with collateral    Depression     Fever blister     Hard of hearing     Hypokalemia 1/9/2023    Hyponatremia 2/4/2022    Hypothyroid     Iron deficiency anemia     Lumbar radiculopathy     with chronic pain    Ocular migraine     Renal disorder     Sleep apnea     cpap       Past Surgical History:   Procedure Laterality Date     ADENOIDECTOMY      BACK SURGERY      x 12    CARDIAC CATHETERIZATION  2016    subtotalled LAD with right to left collaterals    CATARACT EXTRACTION W/  INTRAOCULAR LENS IMPLANT Left     Dr Coleman     CYSTOSCOPIC LITHOLAPAXY N/A 6/27/2019    Procedure: CYSTOLITHOLAPAXY;  Surgeon: Shireen Mayo MD;  Location: [facility] OR 2ND FLR;  Service: Urology;  Laterality: N/A;    CYSTOSCOPIC LITHOLAPAXY N/A 9/3/2019    Procedure: CYSTOLITHOLAPAXY;  Surgeon: Shireen Mayo MD;  Location: [facility] OR 2ND FLR;  Service: Urology;  Laterality: N/A;    CYSTOSCOPY N/A 7/13/2021    Procedure: CYSTOSCOPY;  Surgeon: Shireen Mayo MD;  Location: [facility] OR 1ST FLR;  Service: Urology;  Laterality: N/A;    CYSTOSCOPY  11/16/2021    Procedure: CYSTOSCOPY;  Surgeon: Shireen Mayo MD;  Location: [facility] OR 1ST FLR;  Service: Urology;;    CYSTOSCOPY  7/19/2022    Procedure: CYSTOSCOPY;  Surgeon: Shireen Mayo MD;  Location: [facility] OR 1ST FLR;  Service: Urology;;    CYSTOSCOPY WITH INJECTION OF PERIURETHRAL BULKING AGENT  7/19/2022    Procedure: CYSTOSCOPY, WITH PERIURETHRAL BULKING AGENT INJECTION-MACROPLASTIQUE;  Surgeon: Shireen Mayo MD;  [facility];  Service: Urology;;    CYSTOSCOPY,WITH BOTULINUM TOXIN INJECTION N/A 12/13/2022    Procedure: CYSTOSCOPY,WITH BOTULINUM TOXIN INJECTION;  Surgeon: Shireen Mayo MD;  Location: [facility] OR 1ST FLR;  Service: Urology;  Laterality: N/A;  300 U    ESOPHAGOGASTRODUODENOSCOPY N/A 5/23/2018    Procedure: ESOPHAGOGASTRODUODENOSCOPY (EGD);  Surgeon: Prince Vance MD;  Location: [facility] (4TH FLR);  Service: Endoscopy;  Laterality: N/A;  r/s 'd per Dr. Vance due to family emergency- ER    HYSTERECTOMY  1975    endometriosis    INJECTION OF BOTULINUM TOXIN TYPE A  7/13/2021    Procedure: INJECTION, BOTULINUM TOXIN, 200units;  Surgeon: Shireen Mayo MD;  Location: [facility] OR 1ST FLR;  Service: Urology;;    INJECTION OF BOTULINUM TOXIN TYPE A  11/16/2021    Procedure:  INJECTION, BOTULINUM TOXIN, 200units;  Surgeon: Shireen Mayo MD;  Location: [facility] OR [address] [place] [address];  Service: Urology;;    INJECTION OF BOTULINUM TOXIN TYPE A  7/19/2022    Procedure: INJECTION, BOTULINUM TOXIN, 300 units ;  Surgeon: Shireen Mayo MD;  [facility];  Service: Urology;;    INSERTION, SUPRAPUBIC CATHETER N/A 12/13/2022    Procedure: INSERTION, SUPRAPUBIC CATHETER;  Surgeon: Shireen Mayo MD;  Location: [facility] OR [address] [place] [address];  Service: Urology;  Laterality: N/A;  exchange    pain pump placement      SQ Dilaudid Pump managed by Dr. Hillman, Pain Management    REMOVAL OF BONE SPUR OF FOOT Bilateral 9/16/2022    Procedure: EXCISION ARTHRITIC BONE, BILATERAL FOOT;  Surgeon: Adam Mcguire [facility];  Location: [facility];  Service: Podiatry;  Laterality: Bilateral;    REPLACEMENT OF CATHETER N/A 10/31/2019    Procedure: REPLACEMENT, CATHETER-SUPRAPUBIC;  Surgeon: Shireen Mayo MD;  Location: [facility] OR [address] [place] [address];  Service: Urology;  Laterality: N/A;    SPINAL CORD STIMULATOR REMOVAL      before [facility]    SPINE SURGERY  5-13-13    CERVICAL FUSION    TONSILLECTOMY         Review of patient's allergies indicates:   Allergen Reactions    (d)-limonene flavor      Other reaction(s): difficult intubation  Other reaction(s): Difficulty breathing    Bactrim [sulfamethoxazole-trimethoprim] Anaphylaxis    Benadryl [diphenhydramine hcl] Shortness Of Breath    Fentanyl Itching, Nausea And Vomiting and Swelling             Imitrex [sumatriptan succinate] Shortness Of Breath    Percocet [oxycodone-acetaminophen] Shortness Of Breath    Topamax [topiramate] Shortness Of Breath    Vancomycin Anaphylaxis     Rash    Butorphanol tartrate     Darvocet a500 [propoxyphene n-acetaminophen]      Other reaction(s): Difficulty breathing    Evening primrose (oenothera biennis)     White petrolatum-zinc     Zinc oxide-white petrolatum      Other reaction(s): Difficulty breathing    Latex, natural rubber Itching and Rash     Phenytoin Rash and Other (See Comments)     Trouble breathing       Family History       Problem Relation (Age of Onset)    Cancer Mother (55), Father    Cirrhosis Paternal Aunt, Paternal Uncle    Colon cancer Maternal Uncle    Esophageal cancer Father    Heart disease Maternal Uncle    Liver disease Paternal Aunt, Paternal Uncle    No Known Problems Brother, Brother, Sister, Maternal Aunt, Maternal Grandfather, Paternal Grandmother, Paternal Grandfather    Parkinsonism Maternal Grandmother    Tremor Maternal Grandmother            Tobacco Use    Smoking status: Never    Smokeless tobacco: Never   Substance and Sexual Activity    Alcohol use: Never    Drug use: No    Sexual activity: Never     Partners: Male       Review of Systems   Constitutional:  Positive for chills and fever.   Genitourinary:  Positive for flank pain and pelvic pain. Negative for decreased urine volume, difficulty urinating and hematuria.     Objective:     Temp:  [97.6 °F (36.4 °C)-97.8 °F (36.6 °C)] 97.6 °F (36.4 °C)  Pulse:  [58-62] 58  Resp:  [14-20] 14  SpO2:  [95 %-99 %] 99 %  BP: ()/(54-55) 106/55     There is no height or weight on file to calculate BMI.           Drains       Drain  Duration                  Suprapubic Catheter 12/13/22 100% silicone 20 Fr. 97 days                    Physical Exam  Constitutional:       Appearance: She is not toxic-appearing.   HENT:      Head: Normocephalic.   Cardiovascular:      Rate and Rhythm: Normal rate.   Pulmonary:      Effort: Pulmonary effort is normal. No respiratory distress.   Abdominal:      General: Abdomen is flat. There is no distension.      Palpations: Abdomen is soft.      Tenderness: There is right CVA tenderness and left CVA tenderness.      Comments: CVA tenderness L> R.  Suprapubic tenderness.  SP tube in place draining c/y/u.     Musculoskeletal:         General: No swelling or deformity. Normal range of motion.      Cervical back: Normal range of motion.   Skin:      General: Skin is warm and dry.      Findings: No erythema.   Neurological:      General: No focal deficit present.      Mental Status: She is alert.      Motor: No weakness.   Psychiatric:         Mood and Affect: Mood normal.         Behavior: Behavior normal.       Significant Labs:    BMP:  Recent Labs   Lab 03/20/23  0355      K 3.7   CL 97   CO2 30*   BUN 6*   CREATININE 1.0   CALCIUM 9.0       CBC:  Recent Labs   Lab 03/20/23 0355   WBC 7.21   HGB 9.3*   HCT 30.8*          Urine Culture: No results for input(s): LABURIN in the last 168 hours.  Urine Studies:   Recent Labs   Lab 03/20/23 0426   COLORU Yellow   APPEARANCEUA Hazy*   PHUR 7.0   SPECGRAV 1.010   PROTEINUA 1+*   GLUCUA Negative   KETONESU Negative   BILIRUBINUA Negative   OCCULTUA 2+*   NITRITE Positive*   LEUKOCYTESUR 3+*   RBCUA 30*   WBCUA >100*   BACTERIA Occasional   HYALINECASTS 3*     All pertinent labs results from the past 24 hours have been reviewed.  Recent Lab Results         03/20/23  0426 03/20/23 0358   03/20/23 0355        Albumin     3.2       Alkaline Phosphatase     113       ALT     8       Amorphous, UA Rare           Anion Gap     11       Appearance, UA Hazy           AST     17  Comment: *Result may be interfered by visible hemolysis       Bacteria, UA Occasional           Baso #     0.02       Basophil %     0.3       Bilirubin (UA) Negative           BILIRUBIN TOTAL     0.3  Comment: For infants and newborns, interpretation of results should be based  on gestational age, weight and in agreement with clinical  observations.    Premature Infant recommended reference ranges:  Up to 24 hours.............<8.0 mg/dL  Up to 48 hours............<12.0 mg/dL  3-5 days..................<15.0 mg/dL  6-29 days.................<15.0 mg/dL         BNP   44  Comment: Values of less than 100 pg/ml are consistent with non-CHF populations.         BUN     6       Calcium     9.0       Chloride     97       CO2     30        Color, UA Yellow           Creatinine     1.0       Differential Method     Automated       eGFR     >60.0       Eos #     0.2       Eosinophil %     2.9       Glucose     101       Glucose, UA Negative           Gran # (ANC)     5.4       Gran %     75.3       Hematocrit     30.8       Hemoglobin     9.3       Hepatitis C Ab     Non-reactive       HIV 1/2 Ag/Ab     Non-reactive       Hyaline Casts, UA 3           Immature Grans (Abs)     0.01  Comment: Mild elevation in immature granulocytes is non specific and   can be seen in a variety of conditions including stress response,   acute inflammation, trauma and pregnancy. Correlation with other   laboratory and clinical findings is essential.         Immature Granulocytes     0.1       Ketones, UA Negative           Lactate, Tho     1.1  Comment: Falsely low lactic acid results can be found in samples   containing >=13.0 mg/dL total bilirubin and/or >=3.5 mg/dL   direct bilirubin.         Leukocytes, UA 3+           Lymph #     1.0       Lymph %     13.6       Magnesium   1.8         MCH     25.5       MCHC     30.2       MCV     85       Microscopic Comment SEE COMMENT  Comment: Other formed elements not mentioned in the report are not   present in the microscopic examination.              Mono #     0.6       Mono %     7.8       MPV     9.5       NITRITE UA Positive           nRBC     0       Occult Blood UA 2+           pH, UA 7.0           Platelets     247       Potassium     3.7       PROTEIN TOTAL     7.0       Protein, UA 1+  Comment: Recommend a 24 hour urine protein or a urine   protein/creatinine ratio if globulin induced proteinuria is  clinically suspected.             RBC     3.64       RBC, UA 30           RDW     16.0       Sodium     138       Specific [place], UA 1.010           Specimen UA Urine, Clean Catch           Troponin I     <0.006  Comment: The reference interval for Troponin I represents the 99th percentile   cutoff   for our facility  and is consistent with 3rd generation assay   performance.         WBC Clumps, UA Few           WBC, UA >100           WBC     7.21               Significant Imaging:  All pertinent imaging results/findings from the past 24 hours have been reviewed.                      Assessment and Plan:     Neurogenic bladder  67F with neurogenic bladder and UTI   - urine cx 3/10 sensitive to oxacillin and bactrim (allergic to bactrim)  - will treat with dicloxacillin through upcoming procedure (cysto + botox) scheduled for 3/28  - exchange SP tube today   - f/u urine/blood cx taken in the ED           VTE Risk Mitigation (From admission, onward)      None            Thank you for your consult. I will sign off. Please contact us if you have any additional questions.    Violeta Osorio MD  Urology  [facility] Dept    As above.

## 2023-03-20 NOTE — ED PROVIDER NOTES
"Encounter Date: 3/20/2023       History     Chief Complaint   Patient presents with    MD referral     Pt states her urologist advised her to come to ED due to staph being found in recent urine culture. He told her she would need to be admitted for IV Abx.     Tasha Villalpando is a 67yr female with PMH Neurogenic bladder s/p suprapubic placement, CHF, hypothyroidism who presents to the emergency department due to concerns of positive cultures and instructed by urology to come in for further evaluation and possible IV antibiotics.  Upon assessment patient and  provided very limited history and unclear reasoning for arriving to the emergency department other than "a serious infection, she will die without antibiotics".  Patient is currently reporting bilateral flank pain, generalized weakness.  She reports chills but denies any fevers.  She also notes nausea but denies any vomiting, diarrhea at this time.  She denies any shortness of breath or chest pain.  After reviewing urology notes patient was sent due to concerns of persistent staph aureus in her urine cultures that has resistance and sensitive to Bactrim however patient has anaphylaxis allergy to Bactrim and has previously required hospitalization for Ancef followed by oral clindamycin.  Patient also reports pain but states that she has a intrathecal Dilaudid pump that is managed by anesthesia at another hospital system and recently went for a "refill".    The history is provided by the patient and the spouse.   Review of patient's allergies indicates:   Allergen Reactions    (d)-limonene flavor      Other reaction(s): difficult intubation  Other reaction(s): Difficulty breathing    Bactrim [sulfamethoxazole-trimethoprim] Anaphylaxis    Benadryl [diphenhydramine hcl] Shortness Of Breath    Fentanyl Itching, Nausea And Vomiting and Swelling             Imitrex [sumatriptan succinate] Shortness Of Breath    Percocet [oxycodone-acetaminophen] Shortness Of " Breath    Topamax [topiramate] Shortness Of Breath    Vancomycin Anaphylaxis     Rash    Butorphanol tartrate     Darvocet a500 [propoxyphene n-acetaminophen]      Other reaction(s): Difficulty breathing    Evening primrose (oenothera biennis)     White petrolatum-zinc     Zinc oxide-white petrolatum      Other reaction(s): Difficulty breathing    Latex, natural rubber Itching and Rash    Phenytoin Rash and Other (See Comments)     Trouble breathing     Past Medical History:   Diagnosis Date    Anticoagulant long-term use     Anxiety     Arthritis     Bilateral lower extremity edema     severe chronic    Carotid artery occlusion     Cataract     CHF (congestive heart failure)     Coronary artery disease     subtotalled LAD with collateral    Depression     Fever blister     Hard of hearing     Hypokalemia 1/9/2023    Hyponatremia 2/4/2022    Hypothyroid     Iron deficiency anemia     Lumbar radiculopathy     with chronic pain    Ocular migraine     Renal disorder     Sleep apnea     cpap     Past Surgical History:   Procedure Laterality Date    ADENOIDECTOMY      BACK SURGERY      x 12    CARDIAC CATHETERIZATION  2016    subtotalled LAD with right to left collaterals    CATARACT EXTRACTION W/  INTRAOCULAR LENS IMPLANT Left     Dr Coleman     CYSTOSCOPIC LITHOLAPAXY N/A 6/27/2019    Procedure: CYSTOLITHOLAPAXY;  Surgeon: Shireen Mayo MD;  Location: [facility] OR 2ND FLR;  Service: Urology;  Laterality: N/A;    CYSTOSCOPIC LITHOLAPAXY N/A 9/3/2019    Procedure: CYSTOLITHOLAPAXY;  Surgeon: Shireen Mayo MD;  Location: [facility] OR 2ND FLR;  Service: Urology;  Laterality: N/A;    CYSTOSCOPY N/A 7/13/2021    Procedure: CYSTOSCOPY;  Surgeon: Shireen Mayo MD;  Location: [facility] OR 1ST FLR;  Service: Urology;  Laterality: N/A;    CYSTOSCOPY  11/16/2021    Procedure: CYSTOSCOPY;  Surgeon: Shireen Mayo MD;  Location: [facility] OR 1ST FLR;  Service: Urology;;    CYSTOSCOPY  7/19/2022    Procedure: CYSTOSCOPY;  Surgeon: Shireen  NIEVES Mayo MD;  Location: [address];  Service: Urology;;    CYSTOSCOPY WITH INJECTION OF PERIURETHRAL BULKING AGENT  7/19/2022    Procedure: CYSTOSCOPY, WITH PERIURETHRAL BULKING AGENT INJECTION-MACROPLASTIQUE;  Surgeon: Shireen Mayo MD;  [facility];  Service: Urology;;    CYSTOSCOPY,WITH BOTULINUM TOXIN INJECTION N/A 12/13/2022    Procedure: CYSTOSCOPY,WITH BOTULINUM TOXIN INJECTION;  Surgeon: Shireen Mayo MD;  [facility];  Service: Urology;  Laterality: N/A;  300 U    ESOPHAGOGASTRODUODENOSCOPY N/A 5/23/2018    Procedure: ESOPHAGOGASTRODUODENOSCOPY (EGD);  Surgeon: Prince Vance MD;  [facility]  Service: Endoscopy;  Laterality: N/A;  r/s 'd per Dr. Vance due to family emergency- ER    HYSTERECTOMY  1975    endometriosis    INJECTION OF BOTULINUM TOXIN TYPE A  7/13/2021    Procedure: INJECTION, BOTULINUM TOXIN, 200units;  Surgeon: Shireen Mayo MD;  [facility];  Service: Urology;;    INJECTION OF BOTULINUM TOXIN TYPE A  11/16/2021    Procedure: INJECTION, BOTULINUM TOXIN, 200units;  Surgeon: Shireen Mayo MD;  Location: [facility] OR [address] [place] [address];  Service: Urology;;    INJECTION OF BOTULINUM TOXIN TYPE A  7/19/2022    Procedure: INJECTION, BOTULINUM TOXIN, 300 units ;  Surgeon: Shireen Mayo MD;  [address];  Service: Urology;;    INSERTION, SUPRAPUBIC CATHETER N/A 12/13/2022    Procedure: INSERTION, SUPRAPUBIC CATHETER;  Surgeon: Shireen Mayo MD;  [facility];  Service: Urology;  Laterality: N/A;  exchange    pain pump placement      SQ Dilaudid Pump managed by Dr. Hillman, Pain Management    REMOVAL OF BONE SPUR OF FOOT Bilateral 9/16/2022    Procedure: EXCISION ARTHRITIC BONE, BILATERAL FOOT;  Surgeon: Adam Mcguire DPM;  Location: [facility];  Service: Podiatry;  Laterality: Bilateral;    REPLACEMENT OF CATHETER N/A 10/31/2019    Procedure: REPLACEMENT, CATHETER-SUPRAPUBIC;  Surgeon:  Shireen Mayo MD;  Location: [facility] OR [address] [place] [address];  Service: Urology;  Laterality: N/A;    SPINAL CORD STIMULATOR REMOVAL      before [place]    SPINE SURGERY  5-13-13    CERVICAL FUSION    TONSILLECTOMY       Family History   Problem Relation Age of Onset    Cancer Mother 55        breast    Cancer Father         esophagus,had laryngectomy    Esophageal cancer Father     Parkinsonism Maternal Grandmother     Tremor Maternal Grandmother     No Known Problems Brother     No Known Problems Brother     Heart disease Maternal Uncle     Colon cancer Maternal Uncle         Less than 60    No Known Problems Sister     No Known Problems Maternal Aunt     Cirrhosis Paternal Aunt         ETOH    Liver disease Paternal Aunt         ETOH    Liver disease Paternal Uncle         ETOH    Cirrhosis Paternal Uncle         ETOH    No Known Problems Maternal Grandfather     No Known Problems Paternal Grandmother     No Known Problems Paternal Grandfather     Melanoma Neg Hx     Amblyopia Neg Hx     Blindness Neg Hx     Cataracts Neg Hx     Diabetes Neg Hx     Glaucoma Neg Hx     Hypertension Neg Hx     Macular degeneration Neg Hx     Retinal detachment Neg Hx     Strabismus Neg Hx     Stroke Neg Hx     Thyroid disease Neg Hx     Celiac disease Neg Hx     Colon polyps Neg Hx     Cystic fibrosis Neg Hx     Crohn's disease Neg Hx     Inflammatory bowel disease Neg Hx     Liver cancer Neg Hx     Rectal cancer Neg Hx     Stomach cancer Neg Hx     Ulcerative colitis Neg Hx     Lymphoma Neg Hx      Social History     Tobacco Use    Smoking status: Never    Smokeless tobacco: Never   Substance Use Topics    Alcohol use: Never    Drug use: No     Review of Systems   Constitutional:  Positive for fatigue.   HENT:  Negative for congestion.    Respiratory:  Negative for chest tightness and shortness of breath.    Cardiovascular:  Negative for chest pain and palpitations.   Gastrointestinal:  Positive for nausea. Negative for abdominal pain and  vomiting.   Genitourinary:  Positive for flank pain.   Neurological:  Positive for weakness.     Physical Exam     Initial Vitals [03/20/23 0119]   BP Pulse Resp Temp SpO2   (!) 110/55 62 20 97.8 °F (36.6 °C) 95 %      MAP       --         Physical Exam    Nursing note and vitals reviewed.  Constitutional: She appears well-developed and well-nourished.   HENT:   Head: Normocephalic and atraumatic.   Eyes: EOM are normal. Pupils are equal, round, and reactive to light.   Neck: Neck supple.   Normal range of motion.  Cardiovascular:  Normal rate and regular rhythm.           Pulmonary/Chest: Breath sounds normal. No respiratory distress.   Abdominal:   There is right CVA tenderness.  There is left CVA tenderness.   Musculoskeletal:      Cervical back: Normal range of motion and neck supple.         ED Course   Procedures  Labs Reviewed   CBC W/ AUTO DIFFERENTIAL - Abnormal; Notable for the following components:       Result Value    RBC 3.64 (*)     Hemoglobin 9.3 (*)     Hematocrit 30.8 (*)     MCH 25.5 (*)     MCHC 30.2 (*)     RDW 16.0 (*)     Gran % 75.3 (*)     Lymph % 13.6 (*)     All other components within normal limits   COMPREHENSIVE METABOLIC PANEL - Abnormal; Notable for the following components:    CO2 30 (*)     BUN 6 (*)     Albumin 3.2 (*)     ALT 8 (*)     All other components within normal limits   URINALYSIS, REFLEX TO URINE CULTURE - Abnormal; Notable for the following components:    Appearance, UA Hazy (*)     Protein, UA 1+ (*)     Occult Blood UA 2+ (*)     Nitrite, UA Positive (*)     Leukocytes, UA 3+ (*)     All other components within normal limits    Narrative:     Specimen Source->Urine   URINALYSIS MICROSCOPIC - Abnormal; Notable for the following components:    RBC, UA 30 (*)     WBC, UA >100 (*)     WBC Clumps, UA Few (*)     Hyaline Casts, UA 3 (*)     All other components within normal limits    Narrative:     Specimen Source->Urine   CULTURE, BLOOD   CULTURE, BLOOD   CULTURE, URINE    HIV 1 / 2 ANTIBODY    Narrative:     Release to patient->Immediate   HEPATITIS C ANTIBODY    Narrative:     Release to patient->Immediate   TROPONIN I   LACTIC ACID, PLASMA   MAGNESIUM   B-TYPE NATRIURETIC PEPTIDE     EKG Readings: (Independently Interpreted)   Initial Reading: No STEMI. Previous EKG: Compared with most recent EKG Rhythm: Normal Sinus Rhythm. Ectopy: No Ectopy.   QT prolongation      Imaging Results              X-Ray Chest AP Portable (Final result)  Result time 03/20/23 04:24:39      Final result by Alyce Palumbo MD (03/20/23 04:24:39)                   Impression:      Please see above.      Electronically signed by: Alyce Palumbo MD  Date:    03/20/2023  Time:    04:24               Narrative:    EXAMINATION:  XR CHEST AP PORTABLE    CLINICAL HISTORY:  Sepsis;    TECHNIQUE:  Single frontal view of the chest was performed.    COMPARISON:  01/17/2023    FINDINGS:  Cardiac monitoring leads overlie the chest.  There is unchanged enlargement of the cardiomediastinal silhouette.  There is atherosclerosis of the thoracic aorta.  There are low lung volumes with increased interstitial and parenchymal attenuation and prominence of central pulmonary vasculature.  Findings suggestive of pulmonary edema/CHF although correlation for infectious process advised.  Probable small bilateral pleural effusions.  No evidence of pneumothorax.  Osseous structures intact noting postoperative change of the cervical spine.                                       Medications   sodium chloride 0.9% bolus 250 mL 250 mL (0 mLs Intravenous Stopped 3/20/23 0531)     Medical Decision Making:   History:   Old Records Summarized: records from clinic visits and records from previous admission(s).       <> Summary of Records: Reviewed recent hospitalization for IV antibiotics for similar colonization of urine  Reviewed communication and documentation with urologist  Initial Assessment:   67-year-old female who presented to the  emergency department due to concerns of positive urine cultures and instructed to come in for IV antibiotics.  At the time assessment patient is alert oriented in no acute distress.  Patient is afebrile vitals within normal limits.  Physical exam findings noted above.  Differential Diagnosis:   Urinary tract infection  Sepsis    Clinical Tests:   Lab Tests: Ordered and Reviewed  Radiological Study: Ordered and Reviewed  Medical Tests: Ordered and Reviewed  ED Management:  67-year-old female who presented emergency department for evaluation of positive cultures.  At the time assessment patient is afebrile but reports chills.  Blood pressure initially normal however patient had a drop in blood pressure which patient's  reports is normal.  Patient is alert but somewhat ill-appearing concern for sepsis.  CBC, CMP obtained to evaluate for leukocytosis, anemia, metabolic derangements.  No leukocytosis noted.  Lactic acid was negative.  Blood cultures ordered and pending at this time.  Repeat urinalysis ordered and was positive for nitrites, bacteria, wbc's.  Positive nitrites was not seen on UA from 1 week ago.  Chest x-ray obtained which displayed mild interstitial edema however patient is 95% on room air, with normal trope and BNP.  Previous echo shows normal systolic and diastolic function with an estimated EF of 55%.  Given soft blood pressures patient given 250 cc of IV fluids and systolic blood pressure improved to 106.  At this time patient is resting comfortably with good chest rise with low suspicion of sepsis.  Given patient was instructed to come by urology.  Consulted Urology and discussed plan.  At this time patient is pending Urology evaluation and recommendations.  Patient care handed over to oncoming care team.  See progress note for final plan and disposition.                        Clinical Impression:   Final diagnoses:  [R00.1] Bradycardia  [R10.9] Flank pain (Primary)  [N30.01] Acute cystitis  with hematuria               Tejal Thakur MD  Resident  03/20/23 6633

## 2023-03-20 NOTE — HPI
Tasha Hawley is a 67 y.o. female with a history of neurogenic bladder managed with a suprapubic tube and regular botox injections with Dr. Mayo.  She presented to the ED with subjective fevers/chills, suprapubic pain, and bilateral flank pain L>R for the past three days.  She reports being told she needs IV abx for a recent + urine cx and upcoming botox treatment scheduled 3/28/23.  She has a history of recurrent MDR staph UTIs.  Her SP tube was last exchanged by Dr. Mayo on 2/10/23.       On assessment, she is AFVSS.  UA + nitrites, 30 RBCs, > 100 WBCs, occasional bacteria.  Cr 1.0, WBC 7.2, lactate 1.1.    SP tube in appropriate position and draining c/y/u.  Urine cx from 3/10 growing staph sensitive to oxacillin and bactrim.  No new  imaging available for review.    Of note, patient is allergic to bactrim (anaphylaxis) and reports a hx of allergy to penicillin but says she was recently told she no longer had an allergy.

## 2023-03-20 NOTE — SUBJECTIVE & OBJECTIVE
Past Medical History:   Diagnosis Date    Anticoagulant long-term use     Anxiety     Arthritis     Bilateral lower extremity edema     severe chronic    Carotid artery occlusion     Cataract     CHF (congestive heart failure)     Coronary artery disease     subtotalled LAD with collateral    Depression     Fever blister     Hard of hearing     Hypokalemia 1/9/2023    Hyponatremia 2/4/2022    Hypothyroid     Iron deficiency anemia     Lumbar radiculopathy     with chronic pain    Ocular migraine     Renal disorder     Sleep apnea     cpap       Past Surgical History:   Procedure Laterality Date    ADENOIDECTOMY      BACK SURGERY      x 12    CARDIAC CATHETERIZATION  2016    subtotalled LAD with right to left collaterals    CATARACT EXTRACTION W/  INTRAOCULAR LENS IMPLANT Left     Dr Coleman     CYSTOSCOPIC LITHOLAPAXY N/A 6/27/2019    Procedure: CYSTOLITHOLAPAXY;  Surgeon: Shireen Mayo MD;  Location: [facility] OR 2ND FLR;  Service: Urology;  Laterality: N/A;    CYSTOSCOPIC LITHOLAPAXY N/A 9/3/2019    Procedure: CYSTOLITHOLAPAXY;  Surgeon: Shireen Mayo MD;  Location: [facility] OR 2ND FLR;  Service: Urology;  Laterality: N/A;    CYSTOSCOPY N/A 7/13/2021    Procedure: CYSTOSCOPY;  Surgeon: Shireen Mayo MD;  Location: [facility] OR 1ST FLR;  Service: Urology;  Laterality: N/A;    CYSTOSCOPY  11/16/2021    Procedure: CYSTOSCOPY;  Surgeon: Shireen Mayo MD;  Location: [facility] OR 1ST FLR;  Service: Urology;;    CYSTOSCOPY  7/19/2022    Procedure: CYSTOSCOPY;  Surgeon: Shireen Mayo MD;  Location: [facility] OR 1ST FLR;  Service: Urology;;    CYSTOSCOPY WITH INJECTION OF PERIURETHRAL BULKING AGENT  7/19/2022    Procedure: CYSTOSCOPY, WITH PERIURETHRAL BULKING AGENT INJECTION-MACROPLASTIQUE;  Surgeon: Shireen Mayo MD;  Location: [facility] OR 1ST FLR;  Service: Urology;;    CYSTOSCOPY,WITH BOTULINUM TOXIN INJECTION N/A 12/13/2022    Procedure: CYSTOSCOPY,WITH BOTULINUM TOXIN INJECTION;  Surgeon: Shireen Mayo MD;  Location:  [facility] OR 1ST FLR;  Service: Urology;  Laterality: N/A;  300 U    ESOPHAGOGASTRODUODENOSCOPY N/A 5/23/2018    Procedure: ESOPHAGOGASTRODUODENOSCOPY (EGD);  Surgeon: Prince Vance MD;  Location: [facility] (4TH FLR);  Service: Endoscopy;  Laterality: N/A;  r/s 'd per Dr. Vance due to family emergency- ER    HYSTERECTOMY  1975    endometriosis    INJECTION OF BOTULINUM TOXIN TYPE A  7/13/2021    Procedure: INJECTION, BOTULINUM TOXIN, 200units;  Surgeon: Shireen Mayo MD;  [facility];  Service: Urology;;    INJECTION OF BOTULINUM TOXIN TYPE A  11/16/2021    Procedure: INJECTION, BOTULINUM TOXIN, 200units;  Surgeon: Shireen Mayo MD;  [facility];  Service: Urology;;    INJECTION OF BOTULINUM TOXIN TYPE A  7/19/2022    Procedure: INJECTION, BOTULINUM TOXIN, 300 units ;  Surgeon: Shireen Mayo MD;  [address];  Service: Urology;;    INSERTION, SUPRAPUBIC CATHETER N/A 12/13/2022    Procedure: INSERTION, SUPRAPUBIC CATHETER;  Surgeon: Shireen Mayo MD;  Location: [facility] OR [address] [place] [address];  Service: Urology;  Laterality: N/A;  exchange    pain pump placement      SQ Dilaudid Pump managed by Dr. Hillman, Pain Management    REMOVAL OF BONE SPUR OF FOOT Bilateral 9/16/2022    Procedure: EXCISION ARTHRITIC BONE, BILATERAL FOOT;  Surgeon: NICOLA Mcgrath;  Location: [facility] OR;  Service: Podiatry;  Laterality: Bilateral;    REPLACEMENT OF CATHETER N/A 10/31/2019    Procedure: REPLACEMENT, CATHETER-SUPRAPUBIC;  Surgeon: Shireen Mayo MD;  Location: [facility] OR [address] [place] [address];  Service: Urology;  Laterality: N/A;    SPINAL CORD STIMULATOR REMOVAL      before Larissa    SPINE SURGERY  5-13-13    CERVICAL FUSION    TONSILLECTOMY         Review of patient's allergies indicates:   Allergen Reactions    (d)-limonene flavor      Other reaction(s): difficult intubation  Other reaction(s): Difficulty breathing    Bactrim [sulfamethoxazole-trimethoprim] Anaphylaxis    Benadryl  [diphenhydramine hcl] Shortness Of Breath    Fentanyl Itching, Nausea And Vomiting and Swelling             Imitrex [sumatriptan succinate] Shortness Of Breath    Percocet [oxycodone-acetaminophen] Shortness Of Breath    Topamax [topiramate] Shortness Of Breath    Vancomycin Anaphylaxis     Rash    Butorphanol tartrate     Darvocet a500 [propoxyphene n-acetaminophen]      Other reaction(s): Difficulty breathing    Evening primrose (oenothera biennis)     White petrolatum-zinc     Zinc oxide-white petrolatum      Other reaction(s): Difficulty breathing    Latex, natural rubber Itching and Rash    Phenytoin Rash and Other (See Comments)     Trouble breathing       Family History       Problem Relation (Age of Onset)    Cancer Mother (55), Father    Cirrhosis Paternal Aunt, Paternal Uncle    Colon cancer Maternal Uncle    Esophageal cancer Father    Heart disease Maternal Uncle    Liver disease Paternal Aunt, Paternal Uncle    No Known Problems Brother, Brother, Sister, Maternal Aunt, Maternal Grandfather, Paternal Grandmother, Paternal Grandfather    Parkinsonism Maternal Grandmother    Tremor Maternal Grandmother            Tobacco Use    Smoking status: Never    Smokeless tobacco: Never   Substance and Sexual Activity    Alcohol use: Never    Drug use: No    Sexual activity: Never     Partners: Male       Review of Systems   Constitutional:  Positive for chills and fever.   Genitourinary:  Positive for flank pain and pelvic pain. Negative for decreased urine volume, difficulty urinating and hematuria.     Objective:     Temp:  [97.6 °F (36.4 °C)-97.8 °F (36.6 °C)] 97.6 °F (36.4 °C)  Pulse:  [58-62] 58  Resp:  [14-20] 14  SpO2:  [95 %-99 %] 99 %  BP: ()/(54-55) 106/55     There is no height or weight on file to calculate BMI.           Drains       Drain  Duration                  Suprapubic Catheter 12/13/22 100% silicone 20 Fr. 97 days                    Physical Exam  Constitutional:       Appearance: She is  not toxic-appearing.   HENT:      Head: Normocephalic.   Cardiovascular:      Rate and Rhythm: Normal rate.   Pulmonary:      Effort: Pulmonary effort is normal. No respiratory distress.   Abdominal:      General: Abdomen is flat. There is no distension.      Palpations: Abdomen is soft.      Tenderness: There is right CVA tenderness and left CVA tenderness.      Comments: CVA tenderness L> R.  Suprapubic tenderness.  SP tube in place draining c/y/u.     Musculoskeletal:         General: No swelling or deformity. Normal range of motion.      Cervical back: Normal range of motion.   Skin:     General: Skin is warm and dry.      Findings: No erythema.   Neurological:      General: No focal deficit present.      Mental Status: She is alert.      Motor: No weakness.   Psychiatric:         Mood and Affect: Mood normal.         Behavior: Behavior normal.       Significant Labs:    BMP:  Recent Labs   Lab 03/20/23  0355      K 3.7   CL 97   CO2 30*   BUN 6*   CREATININE 1.0   CALCIUM 9.0       CBC:  Recent Labs   Lab 03/20/23  0355   WBC 7.21   HGB 9.3*   HCT 30.8*          Urine Culture: No results for input(s): LABURIN in the last 168 hours.  Urine Studies:   Recent Labs   Lab 03/20/23  0426   COLORU Yellow   APPEARANCEUA Hazy*   PHUR 7.0   SPECGRAV 1.010   PROTEINUA 1+*   GLUCUA Negative   KETONESU Negative   BILIRUBINUA Negative   OCCULTUA 2+*   NITRITE Positive*   LEUKOCYTESUR 3+*   RBCUA 30*   WBCUA >100*   BACTERIA Occasional   HYALINECASTS 3*     All pertinent labs results from the past 24 hours have been reviewed.  Recent Lab Results         03/20/23  0426   03/20/23  0358   03/20/23  0355        Albumin     3.2       Alkaline Phosphatase     113       ALT     8       Amorphous, UA Rare           Anion Gap     11       Appearance, UA Hazy           AST     17  Comment: *Result may be interfered by visible hemolysis       Bacteria, UA Occasional           Baso #     0.02       Basophil %     0.3        Bilirubin (UA) Negative           BILIRUBIN TOTAL     0.3  Comment: For infants and newborns, interpretation of results should be based  on gestational age, weight and in agreement with clinical  observations.    Premature Infant recommended reference ranges:  Up to 24 hours.............<8.0 mg/dL  Up to 48 hours............<12.0 mg/dL  3-5 days..................<15.0 mg/dL  6-29 days.................<15.0 mg/dL         BNP   44  Comment: Values of less than 100 pg/ml are consistent with non-CHF populations.         BUN     6       Calcium     9.0       Chloride     97       CO2     30       Color, UA Yellow           Creatinine     1.0       Differential Method     Automated       eGFR     >60.0       Eos #     0.2       Eosinophil %     2.9       Glucose     101       Glucose, UA Negative           Gran # (ANC)     5.4       Gran %     75.3       Hematocrit     30.8       Hemoglobin     9.3       Hepatitis C Ab     Non-reactive       HIV 1/2 Ag/Ab     Non-reactive       Hyaline Casts, UA 3           Immature Grans (Abs)     0.01  Comment: Mild elevation in immature granulocytes is non specific and   can be seen in a variety of conditions including stress response,   acute inflammation, trauma and pregnancy. Correlation with other   laboratory and clinical findings is essential.         Immature Granulocytes     0.1       Ketones, UA Negative           Lactate, Tho     1.1  Comment: Falsely low lactic acid results can be found in samples   containing >=13.0 mg/dL total bilirubin and/or >=3.5 mg/dL   direct bilirubin.         Leukocytes, UA 3+           Lymph #     1.0       Lymph %     13.6       Magnesium   1.8         MCH     25.5       MCHC     30.2       MCV     85       Microscopic Comment SEE COMMENT  Comment: Other formed elements not mentioned in the report are not   present in the microscopic examination.              Mono #     0.6       Mono %     7.8       MPV     9.5       NITRITE UA Positive            nRBC     0       Occult Blood UA 2+           pH, UA 7.0           Platelets     247       Potassium     3.7       PROTEIN TOTAL     7.0       Protein, UA 1+  Comment: Recommend a 24 hour urine protein or a urine   protein/creatinine ratio if globulin induced proteinuria is  clinically suspected.             RBC     3.64       RBC, UA 30           RDW     16.0       Sodium     138       Specific [place], UA 1.010           Specimen UA Urine, Clean Catch           Troponin I     <0.006  Comment: The reference interval for Troponin I represents the 99th percentile   cutoff   for our facility and is consistent with 3rd generation assay   performance.         WBC Clumps, UA Few           WBC, UA >100           WBC     7.21               Significant Imaging:  All pertinent imaging results/findings from the past 24 hours have been reviewed.

## 2023-03-20 NOTE — ED TRIAGE NOTES
Tasha Bronsonngelo, a 67 y.o. female presents to the ED w/ complaint of staph infection in urine culture per home MD.     Triage note:  Chief Complaint   Patient presents with    MD referral     Pt states her urologist advised her to come to ED due to staph being found in recent urine culture. He told her she would need to be admitted for IV Abx.     Review of patient's allergies indicates:   Allergen Reactions    (d)-limonene flavor      Other reaction(s): difficult intubation  Other reaction(s): Difficulty breathing    Bactrim [sulfamethoxazole-trimethoprim] Anaphylaxis    Benadryl [diphenhydramine hcl] Shortness Of Breath    Fentanyl Itching, Nausea And Vomiting and Swelling             Imitrex [sumatriptan succinate] Shortness Of Breath    Percocet [oxycodone-acetaminophen] Shortness Of Breath    Topamax [topiramate] Shortness Of Breath    Vancomycin Anaphylaxis     Rash    Butorphanol tartrate     Darvocet a500 [propoxyphene n-acetaminophen]      Other reaction(s): Difficulty breathing    Evening primrose (oenothera biennis)     White petrolatum-zinc     Zinc oxide-white petrolatum      Other reaction(s): Difficulty breathing    Latex, natural rubber Itching and Rash    Phenytoin Rash and Other (See Comments)     Trouble breathing     Past Medical History:   Diagnosis Date    Anticoagulant long-term use     Anxiety     Arthritis     Bilateral lower extremity edema     severe chronic    Carotid artery occlusion     Cataract     CHF (congestive heart failure)     Coronary artery disease     subtotalled LAD with collateral    Depression     Fever blister     Hard of hearing     Hypokalemia 1/9/2023    Hyponatremia 2/4/2022    Hypothyroid     Iron deficiency anemia     Lumbar radiculopathy     with chronic pain    Ocular migraine     Renal disorder     Sleep apnea     cpap

## 2023-03-20 NOTE — ED NOTES
Attempted to change out suprapubic cath per MD orders. Unable to remove due to resistance and pain per pt. Procedure stopped. MD made aware.

## 2023-03-20 NOTE — ASSESSMENT & PLAN NOTE
67F with neurogenic bladder and UTI   - urine cx 3/10 sensitive to oxacillin and bactrim (allergic to bactrim)  - will treat with dicloxacillin through upcoming procedure (cysto + botox) scheduled for 3/28  - will plan for SP tube exchange at time of botox   - f/u urine/blood cx taken in the ED

## 2023-03-21 ENCOUNTER — TELEPHONE (OUTPATIENT)
Dept: NEPHROLOGY | Facility: CLINIC | Age: 68
End: 2023-03-21
Payer: MEDICARE

## 2023-03-21 LAB — BACTERIA UR CULT: NO GROWTH

## 2023-03-22 ENCOUNTER — HOSPITAL ENCOUNTER (INPATIENT)
Facility: HOSPITAL | Age: 68
LOS: 10 days | Discharge: HOME HEALTH | DRG: 292 | End: 2023-04-03
Attending: STUDENT IN AN ORGANIZED HEALTH CARE EDUCATION/TRAINING PROGRAM | Admitting: STUDENT IN AN ORGANIZED HEALTH CARE EDUCATION/TRAINING PROGRAM
Payer: MEDICARE

## 2023-03-22 ENCOUNTER — NURSE TRIAGE (OUTPATIENT)
Dept: ADMINISTRATIVE | Facility: CLINIC | Age: 68
End: 2023-03-22
Payer: MEDICARE

## 2023-03-22 DIAGNOSIS — R07.9 CHEST PAIN: ICD-10-CM

## 2023-03-22 DIAGNOSIS — I50.9 HEART FAILURE, UNSPECIFIED HF CHRONICITY, UNSPECIFIED HEART FAILURE TYPE: Primary | ICD-10-CM

## 2023-03-22 DIAGNOSIS — E27.40 ADRENAL INSUFFICIENCY: ICD-10-CM

## 2023-03-22 DIAGNOSIS — J96.01 ACUTE HYPOXEMIC RESPIRATORY FAILURE: ICD-10-CM

## 2023-03-22 DIAGNOSIS — R06.02 SHORTNESS OF BREATH: ICD-10-CM

## 2023-03-22 DIAGNOSIS — R06.02 SOB (SHORTNESS OF BREATH): ICD-10-CM

## 2023-03-22 DIAGNOSIS — R12 HEART BURN: ICD-10-CM

## 2023-03-22 DIAGNOSIS — R60.9 SWELLING: ICD-10-CM

## 2023-03-22 DIAGNOSIS — J96.02 ACUTE RESPIRATORY FAILURE WITH HYPOXIA AND HYPERCARBIA: ICD-10-CM

## 2023-03-22 DIAGNOSIS — I50.32 CHRONIC DIASTOLIC HEART FAILURE: ICD-10-CM

## 2023-03-22 DIAGNOSIS — J96.01 ACUTE RESPIRATORY FAILURE WITH HYPOXIA AND HYPERCARBIA: ICD-10-CM

## 2023-03-22 DIAGNOSIS — I50.9 CONGESTIVE HEART FAILURE, UNSPECIFIED HF CHRONICITY, UNSPECIFIED HEART FAILURE TYPE: ICD-10-CM

## 2023-03-22 LAB
ALBUMIN SERPL BCP-MCNC: 3.4 G/DL (ref 3.5–5.2)
ALP SERPL-CCNC: 107 U/L (ref 55–135)
ALT SERPL W/O P-5'-P-CCNC: 9 U/L (ref 10–44)
ANION GAP SERPL CALC-SCNC: 12 MMOL/L (ref 8–16)
AST SERPL-CCNC: 13 U/L (ref 10–40)
BASOPHILS # BLD AUTO: 0 K/UL (ref 0–0.2)
BASOPHILS NFR BLD: 0 % (ref 0–1.9)
BILIRUB SERPL-MCNC: 0.5 MG/DL (ref 0.1–1)
BNP SERPL-MCNC: 44 PG/ML (ref 0–99)
BUN SERPL-MCNC: 6 MG/DL (ref 8–23)
CALCIUM SERPL-MCNC: 9 MG/DL (ref 8.7–10.5)
CHLORIDE SERPL-SCNC: 95 MMOL/L (ref 95–110)
CO2 SERPL-SCNC: 33 MMOL/L (ref 23–29)
CREAT SERPL-MCNC: 1 MG/DL (ref 0.5–1.4)
DIFFERENTIAL METHOD: ABNORMAL
EOSINOPHIL # BLD AUTO: 0.2 K/UL (ref 0–0.5)
EOSINOPHIL NFR BLD: 3.8 % (ref 0–8)
ERYTHROCYTE [DISTWIDTH] IN BLOOD BY AUTOMATED COUNT: 15.7 % (ref 11.5–14.5)
EST. GFR  (NO RACE VARIABLE): >60 ML/MIN/1.73 M^2
GLUCOSE SERPL-MCNC: 92 MG/DL (ref 70–110)
HCT VFR BLD AUTO: 33.1 % (ref 37–48.5)
HGB BLD-MCNC: 10.3 G/DL (ref 12–16)
IMM GRANULOCYTES # BLD AUTO: 0.02 K/UL (ref 0–0.04)
IMM GRANULOCYTES NFR BLD AUTO: 0.4 % (ref 0–0.5)
LYMPHOCYTES # BLD AUTO: 0.4 K/UL (ref 1–4.8)
LYMPHOCYTES NFR BLD: 6.8 % (ref 18–48)
MCH RBC QN AUTO: 26.3 PG (ref 27–31)
MCHC RBC AUTO-ENTMCNC: 31.1 G/DL (ref 32–36)
MCV RBC AUTO: 84 FL (ref 82–98)
MONOCYTES # BLD AUTO: 0.3 K/UL (ref 0.3–1)
MONOCYTES NFR BLD: 5.8 % (ref 4–15)
NEUTROPHILS # BLD AUTO: 4.4 K/UL (ref 1.8–7.7)
NEUTROPHILS NFR BLD: 83.2 % (ref 38–73)
NRBC BLD-RTO: 0 /100 WBC
PLATELET # BLD AUTO: 266 K/UL (ref 150–450)
PMV BLD AUTO: 9 FL (ref 9.2–12.9)
POTASSIUM SERPL-SCNC: 3.2 MMOL/L (ref 3.5–5.1)
PROT SERPL-MCNC: 7.4 G/DL (ref 6–8.4)
RBC # BLD AUTO: 3.92 M/UL (ref 4–5.4)
SODIUM SERPL-SCNC: 140 MMOL/L (ref 136–145)
TROPONIN I SERPL DL<=0.01 NG/ML-MCNC: <0.006 NG/ML (ref 0–0.03)
WBC # BLD AUTO: 5.32 K/UL (ref 3.9–12.7)

## 2023-03-22 PROCEDURE — G0378 HOSPITAL OBSERVATION PER HR: HCPCS

## 2023-03-22 PROCEDURE — 94761 N-INVAS EAR/PLS OXIMETRY MLT: CPT

## 2023-03-22 PROCEDURE — 63600175 PHARM REV CODE 636 W HCPCS: Performed by: STUDENT IN AN ORGANIZED HEALTH CARE EDUCATION/TRAINING PROGRAM

## 2023-03-22 PROCEDURE — 80053 COMPREHEN METABOLIC PANEL: CPT | Performed by: STUDENT IN AN ORGANIZED HEALTH CARE EDUCATION/TRAINING PROGRAM

## 2023-03-22 PROCEDURE — 85025 COMPLETE CBC W/AUTO DIFF WBC: CPT | Performed by: STUDENT IN AN ORGANIZED HEALTH CARE EDUCATION/TRAINING PROGRAM

## 2023-03-22 PROCEDURE — 63600175 PHARM REV CODE 636 W HCPCS: Performed by: REGISTERED NURSE

## 2023-03-22 PROCEDURE — 83880 ASSAY OF NATRIURETIC PEPTIDE: CPT | Performed by: STUDENT IN AN ORGANIZED HEALTH CARE EDUCATION/TRAINING PROGRAM

## 2023-03-22 PROCEDURE — 84484 ASSAY OF TROPONIN QUANT: CPT | Performed by: STUDENT IN AN ORGANIZED HEALTH CARE EDUCATION/TRAINING PROGRAM

## 2023-03-22 PROCEDURE — 93010 ELECTROCARDIOGRAM REPORT: CPT | Mod: HCNC,,, | Performed by: INTERNAL MEDICINE

## 2023-03-22 PROCEDURE — 93010 EKG 12-LEAD: ICD-10-PCS | Mod: HCNC,,, | Performed by: INTERNAL MEDICINE

## 2023-03-22 PROCEDURE — 99285 EMERGENCY DEPT VISIT HI MDM: CPT | Mod: 25,HCNC

## 2023-03-22 PROCEDURE — 93005 ELECTROCARDIOGRAM TRACING: CPT

## 2023-03-22 RX ORDER — HYDROMORPHONE HYDROCHLORIDE 1 MG/ML
0.5 INJECTION, SOLUTION INTRAMUSCULAR; INTRAVENOUS; SUBCUTANEOUS ONCE
Status: COMPLETED | OUTPATIENT
Start: 2023-03-22 | End: 2023-03-22

## 2023-03-22 RX ORDER — FUROSEMIDE 10 MG/ML
80 INJECTION INTRAMUSCULAR; INTRAVENOUS
Status: COMPLETED | OUTPATIENT
Start: 2023-03-22 | End: 2023-03-22

## 2023-03-22 RX ORDER — KETOROLAC TROMETHAMINE 30 MG/ML
15 INJECTION, SOLUTION INTRAMUSCULAR; INTRAVENOUS
Status: COMPLETED | OUTPATIENT
Start: 2023-03-22 | End: 2023-03-22

## 2023-03-22 RX ADMIN — HYDROMORPHONE HYDROCHLORIDE 0.5 MG: 1 INJECTION, SOLUTION INTRAMUSCULAR; INTRAVENOUS; SUBCUTANEOUS at 11:03

## 2023-03-22 RX ADMIN — KETOROLAC TROMETHAMINE 15 MG: 30 INJECTION, SOLUTION INTRAMUSCULAR at 11:03

## 2023-03-22 RX ADMIN — FUROSEMIDE 80 MG: 10 INJECTION, SOLUTION INTRAMUSCULAR; INTRAVENOUS at 11:03

## 2023-03-22 NOTE — TELEPHONE ENCOUNTER
Pt states that she was dx with a staph infection on Thursday and told she needed IV ABT. Pt states that she went to ED and was told that infection was not in blood stream and given oral ABT. Pt states that she took 2 dicloxacillin to catch up on ABT. Now c/o vomiting, diarrhea, headache, cool/pale/clammy skin and SOB. States that she feels like she will pass out of she stands and can't catch a breath. Advised to call 911 per protocol.States that  will call. Encounter routed to provider.       Reason for Disposition   Shock suspected (e.g., cold/pale/clammy skin, too weak to stand, low BP, rapid pulse)    Protocols used: Diarrhea on Antibiotics-A-AH

## 2023-03-23 DIAGNOSIS — N39.41 URGE INCONTINENCE: ICD-10-CM

## 2023-03-23 PROBLEM — J96.01 ACUTE HYPOXEMIC RESPIRATORY FAILURE: Status: ACTIVE | Noted: 2023-03-23

## 2023-03-23 LAB
BACTERIA #/AREA URNS HPF: ABNORMAL /HPF
BILIRUB UR QL STRIP: NEGATIVE
CLARITY UR: CLEAR
COLOR UR: YELLOW
GLUCOSE UR QL STRIP: NEGATIVE
HGB UR QL STRIP: ABNORMAL
KETONES UR QL STRIP: NEGATIVE
LEUKOCYTE ESTERASE UR QL STRIP: ABNORMAL
MICROSCOPIC COMMENT: ABNORMAL
NITRITE UR QL STRIP: NEGATIVE
PH UR STRIP: 6 [PH] (ref 5–8)
PROCALCITONIN SERPL IA-MCNC: 0.1 NG/ML
PROT UR QL STRIP: NEGATIVE
RBC #/AREA URNS HPF: 2 /HPF (ref 0–4)
SARS-COV-2 RDRP RESP QL NAA+PROBE: NEGATIVE
SP GR UR STRIP: 1.01 (ref 1–1.03)
SQUAMOUS #/AREA URNS HPF: 0 /HPF
URN SPEC COLLECT METH UR: ABNORMAL
UROBILINOGEN UR STRIP-ACNC: NEGATIVE EU/DL
WBC #/AREA URNS HPF: 8 /HPF (ref 0–5)

## 2023-03-23 PROCEDURE — 25000003 PHARM REV CODE 250: Performed by: STUDENT IN AN ORGANIZED HEALTH CARE EDUCATION/TRAINING PROGRAM

## 2023-03-23 PROCEDURE — 27000190 HC CPAP FULL FACE MASK W/VALVE

## 2023-03-23 PROCEDURE — 25000003 PHARM REV CODE 250: Performed by: REGISTERED NURSE

## 2023-03-23 PROCEDURE — 94761 N-INVAS EAR/PLS OXIMETRY MLT: CPT

## 2023-03-23 PROCEDURE — 81000 URINALYSIS NONAUTO W/SCOPE: CPT | Performed by: STUDENT IN AN ORGANIZED HEALTH CARE EDUCATION/TRAINING PROGRAM

## 2023-03-23 PROCEDURE — 99900035 HC TECH TIME PER 15 MIN (STAT)

## 2023-03-23 PROCEDURE — U0002 COVID-19 LAB TEST NON-CDC: HCPCS | Performed by: STUDENT IN AN ORGANIZED HEALTH CARE EDUCATION/TRAINING PROGRAM

## 2023-03-23 PROCEDURE — 96372 THER/PROPH/DIAG INJ SC/IM: CPT | Performed by: REGISTERED NURSE

## 2023-03-23 PROCEDURE — G0378 HOSPITAL OBSERVATION PER HR: HCPCS

## 2023-03-23 PROCEDURE — 84145 PROCALCITONIN (PCT): CPT | Performed by: STUDENT IN AN ORGANIZED HEALTH CARE EDUCATION/TRAINING PROGRAM

## 2023-03-23 PROCEDURE — 94660 CPAP INITIATION&MGMT: CPT

## 2023-03-23 PROCEDURE — 63600175 PHARM REV CODE 636 W HCPCS: Performed by: REGISTERED NURSE

## 2023-03-23 PROCEDURE — 25000003 PHARM REV CODE 250: Performed by: NURSE PRACTITIONER

## 2023-03-23 PROCEDURE — 87040 BLOOD CULTURE FOR BACTERIA: CPT | Performed by: STUDENT IN AN ORGANIZED HEALTH CARE EDUCATION/TRAINING PROGRAM

## 2023-03-23 PROCEDURE — 94799 UNLISTED PULMONARY SVC/PX: CPT

## 2023-03-23 PROCEDURE — 63600175 PHARM REV CODE 636 W HCPCS: Performed by: STUDENT IN AN ORGANIZED HEALTH CARE EDUCATION/TRAINING PROGRAM

## 2023-03-23 RX ORDER — PROPOFOL 10 MG/ML
INJECTION, EMULSION INTRAVENOUS
Status: ON HOLD | COMMUNITY
Start: 2022-12-13 | End: 2023-03-31

## 2023-03-23 RX ORDER — POTASSIUM CHLORIDE 20 MEQ/1
20 TABLET, EXTENDED RELEASE ORAL 2 TIMES DAILY
Status: DISCONTINUED | OUTPATIENT
Start: 2023-03-23 | End: 2023-03-24

## 2023-03-23 RX ORDER — ENOXAPARIN SODIUM 100 MG/ML
40 INJECTION SUBCUTANEOUS EVERY 24 HOURS
Status: DISCONTINUED | OUTPATIENT
Start: 2023-03-23 | End: 2023-04-03 | Stop reason: HOSPADM

## 2023-03-23 RX ORDER — ASPIRIN 81 MG/1
81 TABLET ORAL DAILY
Status: DISCONTINUED | OUTPATIENT
Start: 2023-03-23 | End: 2023-04-03 | Stop reason: HOSPADM

## 2023-03-23 RX ORDER — PANTOPRAZOLE SODIUM 40 MG/1
40 TABLET, DELAYED RELEASE ORAL DAILY
Status: DISCONTINUED | OUTPATIENT
Start: 2023-03-23 | End: 2023-03-25

## 2023-03-23 RX ORDER — MIDAZOLAM HYDROCHLORIDE 1 MG/ML
INJECTION, SOLUTION INTRAMUSCULAR; INTRAVENOUS
Status: ON HOLD | COMMUNITY
Start: 2022-12-13 | End: 2023-03-31

## 2023-03-23 RX ORDER — AMPICILLIN 1 G/1
INJECTION, POWDER, FOR SOLUTION INTRAMUSCULAR; INTRAVENOUS
Status: ON HOLD | COMMUNITY
Start: 2022-12-13 | End: 2023-03-31

## 2023-03-23 RX ORDER — DICLOXACILLIN SODIUM 250 MG/1
500 CAPSULE ORAL 4 TIMES DAILY
Status: DISCONTINUED | OUTPATIENT
Start: 2023-03-23 | End: 2023-03-23

## 2023-03-23 RX ORDER — QUETIAPINE FUMARATE 100 MG/1
200 TABLET, FILM COATED ORAL NIGHTLY
Status: DISCONTINUED | OUTPATIENT
Start: 2023-03-23 | End: 2023-04-03 | Stop reason: HOSPADM

## 2023-03-23 RX ORDER — TRAZODONE HYDROCHLORIDE 50 MG/1
50 TABLET ORAL NIGHTLY
Status: DISCONTINUED | OUTPATIENT
Start: 2023-03-23 | End: 2023-03-31

## 2023-03-23 RX ORDER — GENTAMICIN SULFATE 40 MG/ML
INJECTION, SOLUTION INTRAMUSCULAR; INTRAVENOUS
Status: ON HOLD | COMMUNITY
Start: 2022-12-13 | End: 2023-03-31

## 2023-03-23 RX ORDER — MAG HYDROX/ALUMINUM HYD/SIMETH 200-200-20
30 SUSPENSION, ORAL (FINAL DOSE FORM) ORAL EVERY 6 HOURS PRN
Status: DISCONTINUED | OUTPATIENT
Start: 2023-03-23 | End: 2023-04-03 | Stop reason: HOSPADM

## 2023-03-23 RX ORDER — HYDROCODONE BITARTRATE AND ACETAMINOPHEN 5; 325 MG/1; MG/1
1 TABLET ORAL EVERY 8 HOURS PRN
Status: DISCONTINUED | OUTPATIENT
Start: 2023-03-23 | End: 2023-03-28

## 2023-03-23 RX ORDER — CEPHALEXIN 500 MG/1
500 CAPSULE ORAL EVERY 6 HOURS
Status: DISCONTINUED | OUTPATIENT
Start: 2023-03-23 | End: 2023-03-24

## 2023-03-23 RX ORDER — LEVOTHYROXINE SODIUM 137 UG/1
137 TABLET ORAL
Status: DISCONTINUED | OUTPATIENT
Start: 2023-03-23 | End: 2023-03-27

## 2023-03-23 RX ORDER — MIRABEGRON 50 MG/1
TABLET, FILM COATED, EXTENDED RELEASE ORAL
Refills: 0 | OUTPATIENT
Start: 2023-03-23

## 2023-03-23 RX ORDER — FLUOXETINE HYDROCHLORIDE 20 MG/1
60 CAPSULE ORAL DAILY
Status: DISCONTINUED | OUTPATIENT
Start: 2023-03-23 | End: 2023-04-03 | Stop reason: HOSPADM

## 2023-03-23 RX ORDER — ONDANSETRON 2 MG/ML
INJECTION INTRAMUSCULAR; INTRAVENOUS
Status: ON HOLD | COMMUNITY
Start: 2022-12-13 | End: 2023-03-31

## 2023-03-23 RX ORDER — FUROSEMIDE 10 MG/ML
20 INJECTION INTRAMUSCULAR; INTRAVENOUS ONCE
Status: COMPLETED | OUTPATIENT
Start: 2023-03-23 | End: 2023-03-23

## 2023-03-23 RX ORDER — LEVOTHYROXINE SODIUM 137 UG/1
137 TABLET ORAL
Status: ON HOLD | COMMUNITY
End: 2023-04-03 | Stop reason: HOSPADM

## 2023-03-23 RX ORDER — BUTALBITAL, ACETAMINOPHEN AND CAFFEINE 50; 325; 40 MG/1; MG/1; MG/1
1 TABLET ORAL DAILY PRN
Status: DISCONTINUED | OUTPATIENT
Start: 2023-03-23 | End: 2023-04-03 | Stop reason: HOSPADM

## 2023-03-23 RX ORDER — POTASSIUM CHLORIDE 20 MEQ/1
40 TABLET, EXTENDED RELEASE ORAL ONCE
Status: COMPLETED | OUTPATIENT
Start: 2023-03-23 | End: 2023-03-23

## 2023-03-23 RX ORDER — LEVOTHYROXINE SODIUM 125 UG/1
TABLET ORAL
COMMUNITY
Start: 2022-12-14 | End: 2023-03-23 | Stop reason: DRUGHIGH

## 2023-03-23 RX ORDER — ATORVASTATIN CALCIUM 40 MG/1
80 TABLET, FILM COATED ORAL DAILY
Status: DISCONTINUED | OUTPATIENT
Start: 2023-03-23 | End: 2023-04-03 | Stop reason: HOSPADM

## 2023-03-23 RX ORDER — HYDROCODONE BITARTRATE AND ACETAMINOPHEN 10; 325 MG/1; MG/1
1 TABLET ORAL EVERY 6 HOURS PRN
COMMUNITY
Start: 2023-03-10 | End: 2023-09-28 | Stop reason: SDUPTHER

## 2023-03-23 RX ORDER — HYDROCORTISONE 5 MG/1
10 TABLET ORAL DAILY
Status: DISCONTINUED | OUTPATIENT
Start: 2023-03-23 | End: 2023-04-03 | Stop reason: HOSPADM

## 2023-03-23 RX ORDER — ONABOTULINUMTOXINA 100 [USP'U]/1
INJECTION, POWDER, LYOPHILIZED, FOR SOLUTION INTRADERMAL; INTRAMUSCULAR
Status: ON HOLD | COMMUNITY
Start: 2022-12-13 | End: 2023-03-31

## 2023-03-23 RX ORDER — INFLUENZA A VIRUS A/VICTORIA/2570/2019 IVR-215 (H1N1) ANTIGEN (FORMALDEHYDE INACTIVATED), INFLUENZA A VIRUS A/DARWIN/6/2021 IVR-227 (H3N2) ANTIGEN (FORMALDEHYDE INACTIVATED), INFLUENZA B VIRUS B/AUSTRIA/1359417/2021 BVR-26 ANTIGEN (FORMALDEHYDE INACTIVATED), INFLUENZA B VIRUS B/PHUKET/3073/2013 BVR-1B ANTIGEN (FORMALDEHYDE INACTIVATED) 15; 15; 15; 15 UG/.5ML; UG/.5ML; UG/.5ML; UG/.5ML
INJECTION, SUSPENSION INTRAMUSCULAR
Status: ON HOLD | COMMUNITY
Start: 2023-01-06 | End: 2023-03-31

## 2023-03-23 RX ORDER — NITROGLYCERIN 0.4 MG/1
0.4 TABLET SUBLINGUAL EVERY 5 MIN PRN
COMMUNITY

## 2023-03-23 RX ADMIN — FUROSEMIDE 20 MG: 10 INJECTION, SOLUTION INTRAMUSCULAR; INTRAVENOUS at 03:03

## 2023-03-23 RX ADMIN — ASPIRIN 81 MG: 81 TABLET, COATED ORAL at 08:03

## 2023-03-23 RX ADMIN — TRAZODONE HYDROCHLORIDE 50 MG: 50 TABLET ORAL at 08:03

## 2023-03-23 RX ADMIN — HYDROCORTISONE 10 MG: 5 TABLET ORAL at 09:03

## 2023-03-23 RX ADMIN — ALUMINUM HYDROXIDE, MAGNESIUM HYDROXIDE, AND SIMETHICONE 30 ML: 200; 200; 20 SUSPENSION ORAL at 11:03

## 2023-03-23 RX ADMIN — QUETIAPINE FUMARATE 200 MG: 100 TABLET ORAL at 08:03

## 2023-03-23 RX ADMIN — HYDROCODONE BITARTRATE AND ACETAMINOPHEN 1 TABLET: 5; 325 TABLET ORAL at 04:03

## 2023-03-23 RX ADMIN — CEPHALEXIN 500 MG: 500 CAPSULE ORAL at 05:03

## 2023-03-23 RX ADMIN — DICLOXACILLIN SODIUM 500 MG: 250 CAPSULE ORAL at 09:03

## 2023-03-23 RX ADMIN — POTASSIUM CHLORIDE 20 MEQ: 1500 TABLET, EXTENDED RELEASE ORAL at 08:03

## 2023-03-23 RX ADMIN — POTASSIUM CHLORIDE 40 MEQ: 1500 TABLET, EXTENDED RELEASE ORAL at 02:03

## 2023-03-23 RX ADMIN — HYDROCODONE BITARTRATE AND ACETAMINOPHEN 1 TABLET: 5; 325 TABLET ORAL at 02:03

## 2023-03-23 RX ADMIN — DICLOXACILLIN SODIUM 500 MG: 250 CAPSULE ORAL at 03:03

## 2023-03-23 RX ADMIN — ATORVASTATIN CALCIUM 80 MG: 40 TABLET, FILM COATED ORAL at 08:03

## 2023-03-23 RX ADMIN — PANTOPRAZOLE SODIUM 40 MG: 40 TABLET, DELAYED RELEASE ORAL at 02:03

## 2023-03-23 RX ADMIN — FLUOXETINE HYDROCHLORIDE 60 MG: 20 CAPSULE ORAL at 09:03

## 2023-03-23 RX ADMIN — ENOXAPARIN SODIUM 40 MG: 40 INJECTION SUBCUTANEOUS at 04:03

## 2023-03-23 RX ADMIN — LEVOTHYROXINE SODIUM 137 MCG: 137 TABLET ORAL at 06:03

## 2023-03-23 NOTE — PLAN OF CARE
VN cued into pt's room for introduction with pt's permission.  VN role explained and informed pt that VN would be working with bedside nurse and the rest of the care team.  Fall risk and bed alarm protocol education provided.  Instructed pt to call for assistance and agreeable.  Allowed time for questions.   Will cont to be available as needed.      03/23/23 1528   Patient Request   Patient Requested pain medication   Nurse Notification   Bedside Nurse Notified? Yes   Name of Bedside Nurse fermin   Nurse Notfication Method Secure Chat   Nurse Notified Of Patient Request   Admission   Initial VN Admission Questions Complete   Communication Issues? Patient Hearing   Shift   Virtual Nurse - Patient Verbalized Approval Of Camera Use;VN Rounding   Safety/Activity   Patient Rounds call light in patient/parent reach;visualized patient   Safety Promotion/Fall Prevention Fall Risk reviewed with patient/family;instructed to call staff for mobility   Pain/Comfort/Sleep   Preferred Pain Scale number (Numeric Rating Pain Scale)   Pain Body Location - Orientation generalized   Pain Rating (0-10): Rest 10

## 2023-03-23 NOTE — NURSING
Patient transferred to room from ED.  Patient is awake and alert.  [place].  Suprapubic catheter is intact and draining yellow urine.  Has complaints of pain and pain medications given per MAR.  On O2 at 2L per nasal cannula.  Safety is maintained with bed low, wheels locked and side rails up.  Call light within reach.  Will continue to monitor.

## 2023-03-23 NOTE — HPI
67 year old female with a history of CHF, cirrhosis, [place] dependence presents with shortness of breath, myalgias, and lower extremity edema. The swelling has worsened and now there is redness. She says she does not wear oxygen at home. No chest pain. Reports recently seen at [facility] for UTI and was started on dicloxacillin. She says she had diarrhea as well but this was before the abx initiation.  UA appeared grossly infected 3 days ago however no growth to date on urine culture.  Patient with lower extremity edema.  Given 80 mg Lasix in ED. chest x-ray with interstitial edema.  Patient newly on O2 requirements.  Troponin negative.  BNP negative.  Patient will be admitted to Hospital Medicine

## 2023-03-23 NOTE — NURSING
BP 93/49.  Patient is awake and alert.  Non-symptomatic.  Dr. Sullivan notified.  Eating dinner.  Friend at bedside.  Suprapubic catheter is draining yellow color urine. Safety maintained.  Bilateral lower extremities are edematous.  Will continue to monitor.

## 2023-03-23 NOTE — ASSESSMENT & PLAN NOTE
Chest x-ray without pneumonia  Patient has history of COPD  New O2 requirements  Supplemental O2 as needed, may need O2 eval and discharge

## 2023-03-23 NOTE — ED PROVIDER NOTES
Encounter Date: 3/22/2023       History     Chief Complaint   Patient presents with    Shortness of Breath     Pt c/o SOB x 3 days. Speaks in complete sentences. Recently Dx w/ staph infection and started taking ABX yesterday. No obvious signs of allergic reaction. +A/O x 4 w/ ABCs intact, NAD. Pt on O2 @ 2 LPM. ETCO2 50. VSS. EMS pulse ox not working/ unknown room air PTA.     67 year old female with a history of CHF, cirrhosis, meadows dependence presents with shortness of breath, myalgias, and lower extremity edema. The swelling has worsened and now there is redness. She says she does not wear oxygen at home. No chest pain. Reports recently seen at [facility] for UTI and was started on dicloxacillin. She says she had diarrhea as well but this was before the abx initiation.    Review of patient's allergies indicates:   Allergen Reactions    (d)-limonene flavor      Other reaction(s): difficult intubation  Other reaction(s): Difficulty breathing    Bactrim [sulfamethoxazole-trimethoprim] Anaphylaxis    Benadryl [diphenhydramine hcl] Shortness Of Breath    Fentanyl Itching, Nausea And Vomiting and Swelling             Imitrex [sumatriptan succinate] Shortness Of Breath    Percocet [oxycodone-acetaminophen] Shortness Of Breath    Topamax [topiramate] Shortness Of Breath    Vancomycin Anaphylaxis     Rash    Butorphanol tartrate     Darvocet a500 [propoxyphene n-acetaminophen]      Other reaction(s): Difficulty breathing    Evening primrose (oenothera biennis)     White petrolatum-zinc     Zinc oxide-white petrolatum      Other reaction(s): Difficulty breathing    Latex, natural rubber Itching and Rash    Phenytoin Rash and Other (See Comments)     Trouble breathing     Past Medical History:   Diagnosis Date    Anticoagulant long-term use     Anxiety     Arthritis     Bilateral lower extremity edema     severe chronic    Carotid artery occlusion     Cataract     CHF (congestive heart failure)     Coronary artery  disease     subtotalled LAD with collateral    Depression     Fever blister     Hard of hearing     Hypokalemia 1/9/2023    Hyponatremia 2/4/2022    Hypothyroid     Iron deficiency anemia     Lumbar radiculopathy     with chronic pain    Ocular migraine     Renal disorder     Sleep apnea     cpap     Past Surgical History:   Procedure Laterality Date    ADENOIDECTOMY      BACK SURGERY      x 12    CARDIAC CATHETERIZATION  2016    subtotalled LAD with right to left collaterals    CATARACT EXTRACTION W/  INTRAOCULAR LENS IMPLANT Left     Dr Coleman     CYSTOSCOPIC LITHOLAPAXY N/A 6/27/2019    Procedure: CYSTOLITHOLAPAXY;  Surgeon: Shireen Mayo MD;  Location: [facility] OR 2ND FLR;  Service: Urology;  Laterality: N/A;    CYSTOSCOPIC LITHOLAPAXY N/A 9/3/2019    Procedure: CYSTOLITHOLAPAXY;  Surgeon: hSireen Mayo MD;  Location: [facility] OR 2ND FLR;  Service: Urology;  Laterality: N/A;    CYSTOSCOPY N/A 7/13/2021    Procedure: CYSTOSCOPY;  Surgeon: Shireen Mayo MD;  Location: [facility] OR 1ST FLR;  Service: Urology;  Laterality: N/A;    CYSTOSCOPY  11/16/2021    Procedure: CYSTOSCOPY;  Surgeon: Shireen Mayo MD;  Location: [facility] OR 1ST FLR;  Service: Urology;;    CYSTOSCOPY  7/19/2022    Procedure: CYSTOSCOPY;  Surgeon: Shireen Mayo MD;  Location: [facility] OR 1ST FLR;  Service: Urology;;    CYSTOSCOPY WITH INJECTION OF PERIURETHRAL BULKING AGENT  7/19/2022    Procedure: CYSTOSCOPY, WITH PERIURETHRAL BULKING AGENT INJECTION-MACROPLASTIQUE;  Surgeon: Shireen Mayo MD;  Location: [facility] OR 1ST FLR;  Service: Urology;;    CYSTOSCOPY,WITH BOTULINUM TOXIN INJECTION N/A 12/13/2022    Procedure: CYSTOSCOPY,WITH BOTULINUM TOXIN INJECTION;  Surgeon: Shireen Mayo MD;  Location: [facility] OR 1ST FLR;  Service: Urology;  Laterality: N/A;  300 U    ESOPHAGOGASTRODUODENOSCOPY N/A 5/23/2018    Procedure: ESOPHAGOGASTRODUODENOSCOPY (EGD);  Surgeon: Prince Vance MD;  Location: [facility] (4TH FLR);  Service: Endoscopy;   Laterality: N/A;  r/s 'd per Dr. Vance due to family emergency- ER    HYSTERECTOMY  1975    endometriosis    INJECTION OF BOTULINUM TOXIN TYPE A  7/13/2021    Procedure: INJECTION, BOTULINUM TOXIN, 200units;  Surgeon: Shireen Mayo MD;  Location: [facility] OR [address] [place] [address];  Service: Urology;;    INJECTION OF BOTULINUM TOXIN TYPE A  11/16/2021    Procedure: INJECTION, BOTULINUM TOXIN, 200units;  Surgeon: Shireen Mayo MD;  [facility];  Service: Urology;;    INJECTION OF BOTULINUM TOXIN TYPE A  7/19/2022    Procedure: INJECTION, BOTULINUM TOXIN, 300 units ;  Surgeon: Shireen Mayo MD;  Location: [facility] OR [address] [place] [address];  Service: Urology;;    INSERTION, SUPRAPUBIC CATHETER N/A 12/13/2022    Procedure: INSERTION, SUPRAPUBIC CATHETER;  Surgeon: Shireen Mayo MD;  Location: [facility] OR [address] [place] [address];  Service: Urology;  Laterality: N/A;  exchange    pain pump placement      SQ Dilaudid Pump managed by Dr. Hillman, Pain Management    REMOVAL OF BONE SPUR OF FOOT Bilateral 9/16/2022    Procedure: EXCISION ARTHRITIC BONE, BILATERAL FOOT;  Surgeon: NICOLA Mcgrath;  Location: [facility] OR;  Service: Podiatry;  Laterality: Bilateral;    REPLACEMENT OF CATHETER N/A 10/31/2019    Procedure: REPLACEMENT, CATHETER-SUPRAPUBIC;  Surgeon: Shireen Mayo MD;  Location: [facility] OR [address] [place] [address];  Service: Urology;  Laterality: N/A;    SPINAL CORD STIMULATOR REMOVAL      before Lar[facility]    SPINE SURGERY  5-13-13    CERVICAL FUSION    TONSILLECTOMY       Family History   Problem Relation Age of Onset    Cancer Mother 55        breast    Cancer Father         esophagus,had laryngectomy    Esophageal cancer Father     Parkinsonism Maternal Grandmother     Tremor Maternal Grandmother     No Known Problems Brother     No Known Problems Brother     Heart disease Maternal Uncle     Colon cancer Maternal Uncle         Less than 60    No Known Problems Sister     No Known Problems Maternal Aunt     Cirrhosis Paternal Aunt         ETOH     Liver disease Paternal Aunt         ETOH    Liver disease Paternal Uncle         ETOH    Cirrhosis Paternal Uncle         ETOH    No Known Problems Maternal Grandfather     No Known Problems Paternal Grandmother     No Known Problems Paternal Grandfather     Melanoma Neg Hx     Amblyopia Neg Hx     Blindness Neg Hx     Cataracts Neg Hx     Diabetes Neg Hx     Glaucoma Neg Hx     Hypertension Neg Hx     Macular degeneration Neg Hx     Retinal detachment Neg Hx     Strabismus Neg Hx     Stroke Neg Hx     Thyroid disease Neg Hx     Celiac disease Neg Hx     Colon polyps Neg Hx     Cystic fibrosis Neg Hx     Crohn's disease Neg Hx     Inflammatory bowel disease Neg Hx     Liver cancer Neg Hx     Rectal cancer Neg Hx     Stomach cancer Neg Hx     Ulcerative colitis Neg Hx     Lymphoma Neg Hx      Social History     Tobacco Use    Smoking status: Never    Smokeless tobacco: Never   Substance Use Topics    Alcohol use: Never    Drug use: No     Review of Systems   All other systems reviewed and are negative.    Physical Exam     Initial Vitals [03/22/23 1910]   BP Pulse Resp Temp SpO2   110/71 76 (!) 24 98.1 °F (36.7 °C) (!) 94 %      MAP       --         Physical Exam    Nursing note and vitals reviewed.  Constitutional: She appears well-developed and well-nourished. She is not diaphoretic. No distress.   HENT:   Head: Normocephalic and atraumatic.   Eyes: EOM are normal. Pupils are equal, round, and reactive to light.   Neck: Trachea normal and phonation normal. Neck supple. Carotid bruit is not present. No JVD present.   Normal range of motion.  Cardiovascular:  Normal rate, regular rhythm and intact distal pulses.     Exam reveals no gallop and no friction rub.       Murmur heard.  Pulmonary/Chest: Breath sounds normal. No stridor. No respiratory distress. She has no wheezes. She has no rhonchi. She has no rales. She exhibits no tenderness.   Abdominal: Abdomen is soft. Bowel sounds are normal. She exhibits no  distension and no mass. There is no abdominal tenderness. There is no rebound and no guarding.   Musculoskeletal:         General: Edema present.      Cervical back: Normal range of motion and neck supple.      Right lower leg: No swelling. No edema.      Left lower leg: No swelling. No edema.      Comments: 2-3+ pitting edema bilaterally to the lower extremities with overlying redness, warmth and tenderness. Neurovascularly intact distally.      Neurological: She is alert and oriented to person, place, and time. GCS score is 15. GCS eye subscore is 4. GCS verbal subscore is 5. GCS motor subscore is 6.   Skin: Skin is warm and dry. Capillary refill takes less than 2 seconds.   Psychiatric: She has a normal mood and affect. Thought content normal.       ED Course   Procedures  Labs Reviewed   CBC W/ AUTO DIFFERENTIAL - Abnormal; Notable for the following components:       Result Value    RBC 3.92 (*)     Hemoglobin 10.3 (*)     Hematocrit 33.1 (*)     MCH 26.3 (*)     MCHC 31.1 (*)     RDW 15.7 (*)     MPV 9.0 (*)     Lymph # 0.4 (*)     Gran % 83.2 (*)     Lymph % 6.8 (*)     All other components within normal limits   COMPREHENSIVE METABOLIC PANEL - Abnormal; Notable for the following components:    Potassium 3.2 (*)     CO2 33 (*)     BUN 6 (*)     Albumin 3.4 (*)     ALT 9 (*)     All other components within normal limits   TROPONIN I   B-TYPE NATRIURETIC PEPTIDE          Imaging Results              US Lower Extremity Veins Bilateral (Final result)  Result time 03/22/23 21:19:57      Final result by Antonio Anthony DO (03/22/23 21:19:57)                   Impression:      No evidence of deep venous thrombosis in either lower extremity.      Electronically signed by: Antonio Anthony  Date:    03/22/2023  Time:    21:19               Narrative:    EXAMINATION:  US LOWER EXTREMITY VEINS BILATERAL    CLINICAL HISTORY:  Edema, unspecified    TECHNIQUE:  Duplex and color flow Doppler and dynamic compression was  performed of the bilateral lower extremity veins was performed.    COMPARISON:  Lower extremity ultrasound from 04/22/2022.    FINDINGS:  Right thigh veins: The common femoral, femoral, popliteal, upper greater saphenous, and deep femoral veins are patent and free of thrombus. The veins are normally compressible and have normal phasic flow and augmentation response.    Right calf veins: The visualized calf veins are patent.    Left thigh veins: The common femoral, femoral, popliteal, upper greater saphenous, and deep femoral veins are patent and free of thrombus. The veins are normally compressible and have normal phasic flow and augmentation response.    Left calf veins: The visualized calf veins are patent.    Miscellaneous: None                                       X-Ray Chest AP Portable (Final result)  Result time 03/22/23 20:12:40      Final result by Antonio Anthony DO (03/22/23 20:12:40)                   Impression:      Mild interstitial pulmonary edema.      Electronically signed by: Antonio Anthony  Date:    03/22/2023  Time:    20:12               Narrative:    EXAMINATION:  XR CHEST AP PORTABLE    CLINICAL HISTORY:  CHF;    TECHNIQUE:  Single frontal view of the chest was performed.    COMPARISON:  03/20/2023.    FINDINGS:  The lungs are well expanded. There are mild predominantly perihilar interstitial opacities. There is no large focal consolidation. The pleural spaces are clear. The cardiac silhouette is enlarged. There are calcifications of the aortic arch. The visualized osseous structures demonstrate degenerative changes. There is cervical spine hardware. There are thoracic spinal stimulator leads.                                       Medications   ketorolac injection 15 mg (has no administration in time range)   furosemide injection 80 mg (has no administration in time range)     Medical Decision Making:   Initial Assessment:   Hemodynamically stable. Afebrile. Phonating and protecting the  airway spontaneously. No clinical evidence for cardiovascular instability or impending airway compromise. Examination as above. Concerned for fluid overload, possible bilateral DVTs. Will obtain cardiac workup and plan for admission.            ED Course as of 03/22/23 2130   Wed Mar 22, 2023   2105 X-Ray Chest AP Portable [BG]   2127 Labs reviewed. The patient was reassessed frequently and managed aggressively while in the emergency department. Due to the clinical workup and presentation, the patient's condition is concerning and will require additional evaluation. I discussed the objective findings with her including laboratory studies, diagnostic imaging, and any consultant involvement. She was educated on their clinical presentation and all questions were answered. She acknowledged and verbally agreed to the treatment plan. The patient will be admitted to the hospital for further management.     DISCLAIMER: This note was prepared with Stem voice recognition transcription software. Garbled syntax, mangled pronouns, and other bizarre constructions may be attributed to that software system.     [BG]   2129 Twelve lead EKG per my independent interpretation reveals NSR at 72. Normal axis. Poor R Wave progression. No regional ST segment elevation.  [BG]      ED Course User Index  [BG] Tyler Fernández MD                 Clinical Impression:   Final diagnoses:  [R06.02] Shortness of breath  [R60.9] Swelling  [I50.9] Heart failure, unspecified HF chronicity, unspecified heart failure type (Primary)        ED Disposition Condition    Observation                 Tyler Fernández MD  03/22/23 2130

## 2023-03-23 NOTE — ASSESSMENT & PLAN NOTE
Ultrasound negative for DVT  Given 80 mg Lasix in ED  Will start 40 mg daily  BNP normal checks x-ray with interstitial edema

## 2023-03-23 NOTE — ED NOTES
Received report from REJI Chapa . Pt AOX4. Pt up asking for breakfast. Pt appears to be comfortable and ion no distress.

## 2023-03-23 NOTE — ASSESSMENT & PLAN NOTE
UTI diagnosed at outside facility  Started on dicloxacillin  Patient has chronic Motley catheter  No growth to date on urine culture  Will continue antibiotics as patient is immunosuppressed with chronic steroid therapy

## 2023-03-23 NOTE — TELEPHONE ENCOUNTER
----- Message from Francisco ALARCON Route sent at 3/22/2023 10:27 AM CDT -----  Regarding: Emergency Room Visit  Contact: pt. [phone]  Pt. Went to emergency room on 3/20/23. Pt. States medication she was given has her with bad headache,short winded and trouble breathing. Best Call Back Number: pt. [phone]      Pt was advised to go to ER for reaction to medication.

## 2023-03-23 NOTE — SUBJECTIVE & OBJECTIVE
Past Medical History:   Diagnosis Date    Anticoagulant long-term use     Anxiety     Arthritis     Bilateral lower extremity edema     severe chronic    Carotid artery occlusion     Cataract     CHF (congestive heart failure)     Coronary artery disease     subtotalled LAD with collateral    Depression     Fever blister     Hard of hearing     Hypokalemia 1/9/2023    Hyponatremia 2/4/2022    Hypothyroid     Iron deficiency anemia     Lumbar radiculopathy     with chronic pain    Ocular migraine     Renal disorder     Sleep apnea     cpap       Past Surgical History:   Procedure Laterality Date    ADENOIDECTOMY      BACK SURGERY      x 12    CARDIAC CATHETERIZATION  2016    subtotalled LAD with right to left collaterals    CATARACT EXTRACTION W/  INTRAOCULAR LENS IMPLANT Left     Dr Coleman     CYSTOSCOPIC LITHOLAPAXY N/A 6/27/2019    Procedure: CYSTOLITHOLAPAXY;  Surgeon: Shireen Mayo MD;  Location: [facility] OR 2ND FLR;  Service: Urology;  Laterality: N/A;    CYSTOSCOPIC LITHOLAPAXY N/A 9/3/2019    Procedure: CYSTOLITHOLAPAXY;  Surgeon: Shireen Mayo MD;  Location: [facility] OR 2ND FLR;  Service: Urology;  Laterality: N/A;    CYSTOSCOPY N/A 7/13/2021    Procedure: CYSTOSCOPY;  Surgeon: Shireen Mayo MD;  Location: [facility] OR 1ST FLR;  Service: Urology;  Laterality: N/A;    CYSTOSCOPY  11/16/2021    Procedure: CYSTOSCOPY;  Surgeon: Shireen Mayo MD;  Location: [facility] OR 1ST FLR;  Service: Urology;;    CYSTOSCOPY  7/19/2022    Procedure: CYSTOSCOPY;  Surgeon: Shireen Mayo MD;  Location: [facility] OR 1ST FLR;  Service: Urology;;    CYSTOSCOPY WITH INJECTION OF PERIURETHRAL BULKING AGENT  7/19/2022    Procedure: CYSTOSCOPY, WITH PERIURETHRAL BULKING AGENT INJECTION-MACROPLASTIQUE;  Surgeon: Shireen Mayo MD;  Location: [facility] OR 1ST FLR;  Service: Urology;;    CYSTOSCOPY,WITH BOTULINUM TOXIN INJECTION N/A 12/13/2022    Procedure: CYSTOSCOPY,WITH BOTULINUM TOXIN INJECTION;  Surgeon: Shireen Mayo MD;  Location:  [facility] OR 1ST FLR;  Service: Urology;  Laterality: N/A;  300 U    ESOPHAGOGASTRODUODENOSCOPY N/A 5/23/2018    Procedure: ESOPHAGOGASTRODUODENOSCOPY (EGD);  Surgeon: Prince Vance MD;  Location: [facility] (4TH FLR);  Service: Endoscopy;  Laterality: N/A;  r/s 'd per Dr. Vance due to family emergency- ER    HYSTERECTOMY  1975    endometriosis    INJECTION OF BOTULINUM TOXIN TYPE A  7/13/2021    Procedure: INJECTION, BOTULINUM TOXIN, 200units;  Surgeon: Shireen Mayo MD;  [facility];  Service: Urology;;    INJECTION OF BOTULINUM TOXIN TYPE A  11/16/2021    Procedure: INJECTION, BOTULINUM TOXIN, 200units;  Surgeon: Shireen Mayo MD;  [facility];  Service: Urology;;    INJECTION OF BOTULINUM TOXIN TYPE A  7/19/2022    Procedure: INJECTION, BOTULINUM TOXIN, 300 units ;  Surgeon: Shireen Mayo MD;  [address]  Service: Urology;;    INSERTION, SUPRAPUBIC CATHETER N/A 12/13/2022    Procedure: INSERTION, SUPRAPUBIC CATHETER;  Surgeon: Shireen Mayo MD;  Location: [facility] OR [address] [place] [address];  Service: Urology;  Laterality: N/A;  exchange    pain pump placement      SQ Dilaudid Pump managed by Dr. Hillman, Pain Management    REMOVAL OF BONE SPUR OF FOOT Bilateral 9/16/2022    Procedure: EXCISION ARTHRITIC BONE, BILATERAL FOOT;  Surgeon: NICOLA Mcgrath;  Location: [facility] OR;  Service: Podiatry;  Laterality: Bilateral;    REPLACEMENT OF CATHETER N/A 10/31/2019    Procedure: REPLACEMENT, CATHETER-SUPRAPUBIC;  Surgeon: Shireen Mayo MD;  Location: [facility] OR [address] [place] [address];  Service: Urology;  Laterality: N/A;    SPINAL CORD STIMULATOR REMOVAL      before [facility]    SPINE SURGERY  5-13-13    CERVICAL FUSION    TONSILLECTOMY         Review of patient's allergies indicates:   Allergen Reactions    (d)-limonene flavor      Other reaction(s): difficult intubation  Other reaction(s): Difficulty breathing    Bactrim [sulfamethoxazole-trimethoprim] Anaphylaxis    Benadryl  [diphenhydramine hcl] Shortness Of Breath    Fentanyl Itching, Nausea And Vomiting and Swelling             Imitrex [sumatriptan succinate] Shortness Of Breath    Percocet [oxycodone-acetaminophen] Shortness Of Breath    Topamax [topiramate] Shortness Of Breath    Vancomycin Anaphylaxis     Rash    Butorphanol tartrate     Darvocet a500 [propoxyphene n-acetaminophen]      Other reaction(s): Difficulty breathing    Evening primrose (oenothera biennis)     White petrolatum-zinc     Zinc oxide-white petrolatum      Other reaction(s): Difficulty breathing    Latex, natural rubber Itching and Rash    Phenytoin Rash and Other (See Comments)     Trouble breathing       No current facility-administered medications on file prior to encounter.     Current Outpatient Medications on File Prior to Encounter   Medication Sig    aspirin (ECOTRIN) 81 MG EC tablet Take 1 tablet (81 mg total) by mouth once daily.    atorvastatin (LIPITOR) 80 MG tablet TAKE ONE TABLET BY MOUTH EVERY DAY    butalbital-acetaminophen-caffeine -40 mg (FIORICET, ESGIC) -40 mg per tablet Take 1 tablet by mouth daily as needed.    dicloxacillin (DYNAPEN) 500 MG capsule Take 1 capsule (500 mg total) by mouth 4 (four) times daily. for 8 days    ferrous sulfate 325 (65 FE) MG EC tablet Take 1 tablet (325 mg total) by mouth every other day.    FLUoxetine 20 MG capsule Take 3 capsules (60 mg total) by mouth once daily.    furosemide (LASIX) 40 MG tablet Take 1 tablet (40 mg total) by mouth once daily.    HYDROcodone-acetaminophen (NORCO) 5-325 mg per tablet Take 1 tablet by mouth every 8 (eight) hours as needed (breakthrough).    hydrocortisone (CORTEF) 10 MG Tab Take 1 tablet (10 mg total) by mouth once daily.    hydrocortisone (CORTEF) 5 MG Tab Take 1 tablet (5 mg total) by mouth every evening.    intrathecal pain pump compound Hydromorphone (7.5 mg/mL) infusion at 3.6799 mg/day (0.1533 mg/hr) out of a total reservoir volume of 37.3 mL  Pump filled  every 2 months    levothyroxine (SYNTHROID) 137 MCG Tab tablet Take 1 tablet (137 mcg total) by mouth before breakfast.    LIDOcaine (LIDODERM) 5 % Place 1 patch onto the skin once daily. Remove & Discard patch within 12 hours or as directed by MD bailey (MYRBETRIQ) 50 mg Tb24 Take 1 tablet (50 mg total) by mouth once daily.    nitroGLYCERIN (NITROSTAT) 0.4 MG SL tablet Place 1 tablet (0.4 mg total) under the tongue every 5 (five) minutes as needed for Chest pain.    pantoprazole (PROTONIX) 40 MG tablet Take 1 tablet (40 mg total) by mouth once daily.    potassium chloride SA (K-DUR,KLOR-CON) 20 MEQ tablet Take 1 tablet (20 mEq total) by mouth 2 (two) times daily.    promethazine (PHENERGAN) 25 MG tablet Take 25 mg by mouth every 6 (six) hours as needed for Nausea.    QUEtiapine (SEROQUEL) 100 MG Tab TAKE 2 TABLETS (200 MG) BY MOUTH NIGHTLY    senna (SENNA LAX) 8.6 mg tablet Take 2 tablets by mouth 2 (two) times daily.    traZODone (DESYREL) 300 MG tablet Take 1 tablet (300 mg total) by mouth every evening.     Family History       Problem Relation (Age of Onset)    Cancer Mother (55), Father    Cirrhosis Paternal Aunt, Paternal Uncle    Colon cancer Maternal Uncle    Esophageal cancer Father    Heart disease Maternal Uncle    Liver disease Paternal Aunt, Paternal Uncle    No Known Problems Brother, Brother, Sister, Maternal Aunt, Maternal Grandfather, Paternal Grandmother, Paternal Grandfather    Parkinsonism Maternal Grandmother    Tremor Maternal Grandmother          Tobacco Use    Smoking status: Never    Smokeless tobacco: Never   Substance and Sexual Activity    Alcohol use: Never    Drug use: No    Sexual activity: Never     Partners: Male     Review of Systems   Respiratory:  Positive for cough and shortness of breath.    Musculoskeletal:  Positive for back pain.   All other systems reviewed and are negative.  Objective:     Vital Signs (Most Recent):  Temp: 98.4 °F (36.9 °C) (03/22/23 1942)  Pulse:  68 (03/22/23 2335)  Resp: 18 (03/22/23 2335)  BP: (!) 112/56 (03/22/23 2335)  SpO2: 96 % (03/22/23 2335)   Vital Signs (24h Range):  Temp:  [98.1 °F (36.7 °C)-98.4 °F (36.9 °C)] 98.4 °F (36.9 °C)  Pulse:  [68-76] 68  Resp:  [18-26] 18  SpO2:  [94 %-96 %] 96 %  BP: (110-129)/(56-71) 112/56        There is no height or weight on file to calculate BMI.    Physical Exam  Constitutional:       Appearance: She is ill-appearing.   HENT:      Head: Normocephalic.      Mouth/Throat:      Mouth: Mucous membranes are dry.   Eyes:      Pupils: Pupils are equal, round, and reactive to light.   Cardiovascular:      Rate and Rhythm: Normal rate and regular rhythm.      Pulses: Normal pulses.      Heart sounds: Normal heart sounds.   Pulmonary:      Breath sounds: Wheezing and rhonchi present.   Abdominal:      General: Bowel sounds are normal.   Musculoskeletal:         General: Normal range of motion.      Cervical back: Normal range of motion.      Right lower leg: Edema (3+ edema) present.      Left lower leg: Edema (3+ edema) present.   Skin:     General: Skin is dry.      Capillary Refill: Capillary refill takes less than 2 seconds.      Findings: Erythema present.   Neurological:      General: No focal deficit present.      Mental Status: She is alert and oriented to person, place, and time. Mental status is at baseline.   Psychiatric:         Mood and Affect: Mood normal.         Behavior: Behavior normal.         CRANIAL NERVES     CN III, IV, VI   Pupils are equal, round, and reactive to light.     Significant Labs: All pertinent labs within the past 24 hours have been reviewed.  Recent Lab Results         03/22/23 1946        Albumin 3.4       Alkaline Phosphatase 107       ALT 9       Anion Gap 12       AST 13       Baso # 0.00       Basophil % 0.0       BILIRUBIN TOTAL 0.5  Comment: For infants and newborns, interpretation of results should be based  on gestational age, weight and in agreement with  clinical  observations.    Premature Infant recommended reference ranges:  Up to 24 hours.............<8.0 mg/dL  Up to 48 hours............<12.0 mg/dL  3-5 days..................<15.0 mg/dL  6-29 days.................<15.0 mg/dL         BNP 44  Comment: Values of less than 100 pg/ml are consistent with non-CHF populations.       BUN 6       Calcium 9.0       Chloride 95       CO2 33       Creatinine 1.0       Differential Method Automated       eGFR >60       Eos # 0.2       Eosinophil % 3.8       Glucose 92       Gran # (ANC) 4.4       Gran % 83.2       Hematocrit 33.1       Hemoglobin 10.3       Immature Grans (Abs) 0.02  Comment: Mild elevation in immature granulocytes is non specific and   can be seen in a variety of conditions including stress response,   acute inflammation, trauma and pregnancy. Correlation with other   laboratory and clinical findings is essential.         Immature Granulocytes 0.4       Lymph # 0.4       Lymph % 6.8       MCH 26.3       MCHC 31.1       MCV 84       Mono # 0.3       Mono % 5.8       MPV 9.0       nRBC 0       Platelets 266       Potassium 3.2       PROTEIN TOTAL 7.4       RBC 3.92       RDW 15.7       Sodium 140       Troponin I <0.006  Comment: The reference interval for Troponin I represents the 99th percentile   cutoff   for our facility and is consistent with 3rd generation assay   performance.         WBC 5.32               Significant Imaging: I have reviewed all pertinent imaging results/findings within the past 24 hours.  I have reviewed and interpreted all pertinent imaging results/findings within the past 24 hours.

## 2023-03-23 NOTE — PHARMACY MED REC
"    [facility]           Pharmacy  Admission Medication History     The home medication history was taken by Marisel Cruz.      Medication history obtained from Medications listed below were obtained from: Patient/family    Based on information gathered for medication list, you may go to "Admission" then "Reconcile Home Medications" tabs to review and/or act upon those items.     The home medication list has been updated by the Pharmacy department.   Please read ALL comments highlighted in yellow.   Please address this information as you see fit.    Feel free to contact us if you have any questions or require assistance.    The medications listed below were removed from the home medication list.  Please reorder if appropriate:    Patient reports NOT TAKING the following medication(s):  Ferrous sulfate 325mg          No current facility-administered medications on file prior to encounter.     Current Outpatient Medications on File Prior to Encounter   Medication Sig Dispense Refill    aspirin (ECOTRIN) 81 MG EC tablet Take 1 tablet (81 mg total) by mouth once daily. 30 tablet 0    atorvastatin (LIPITOR) 80 MG tablet TAKE ONE TABLET BY MOUTH EVERY DAY (Patient taking differently: Take 80 mg by mouth once daily.) 90 tablet 3    butalbital-acetaminophen-caffeine -40 mg (FIORICET, ESGIC) -40 mg per tablet Take 1 tablet by mouth daily as needed. 15 tablet 0    FLUoxetine 20 MG capsule Take 3 capsules (60 mg total) by mouth once daily. 270 capsule 3    furosemide (LASIX) 40 MG tablet Take 1 tablet (40 mg total) by mouth once daily. 90 tablet 3    HYDROcodone-acetaminophen (NORCO)  mg per tablet Take 1 tablet by mouth every 6 (six) hours as needed for breakthrough pain.      hydrocortisone (CORTEF) 10 MG Tab Take 1 tablet (10 mg total) by mouth once daily. 60 tablet 3    intrathecal pain pump compound Hydromorphone (7.5 mg/mL) infusion at 3.6799 mg/day (0.1533 mg/hr) out of a total " reservoir volume of 37.3 mL  Pump filled every 2 months      levothyroxine (SYNTHROID) 137 MCG Tab tablet Take 137 mcg by mouth before breakfast.      LIDOcaine (LIDODERM) 5 % Place 1 patch onto the skin once daily. Remove & Discard patch within 12 hours or as directed by MD  0    nitroGLYCERIN (NITROSTAT) 0.4 MG SL tablet Place 0.4 mg under the tongue every 5 (five) minutes as needed for Chest pain.      pantoprazole (PROTONIX) 40 MG tablet Take 1 tablet (40 mg total) by mouth once daily. 90 tablet 3    promethazine (PHENERGAN) 25 MG tablet Take 25 mg by mouth every 6 (six) hours as needed for Nausea.      QUEtiapine (SEROQUEL) 100 MG Tab TAKE 2 TABLETS (200 MG) BY MOUTH NIGHTLY (Patient taking differently: Take 200 mg by mouth nightly.) 180 tablet 3    senna (SENNA LAX) 8.6 mg tablet Take 2 tablets by mouth 2 (two) times daily.      traZODone (DESYREL) 300 MG tablet Take 1 tablet (300 mg total) by mouth every evening. 90 tablet 0    ampicillin (OMNIPEN) 1 gram injection       BOTOX 100 unit SolR       dicloxacillin (DYNAPEN) 500 MG capsule Take 1 capsule (500 mg total) by mouth 4 (four) times daily. for 8 days (Patient not taking: Reported on 3/23/2023) 32 capsule 0    FLUAD QUAD 2022-23,65Y UP,,PF, 60 mcg (15 mcg x 4)/0.5 mL Syrg       gentamicin (GARAMYCIN) 40 mg/mL injection       midazolam (VERSED) 1 mg/mL injection       mirabegron (MYRBETRIQ) 50 mg Tb24 Take 1 tablet (50 mg total) by mouth once daily. (Patient not taking: Reported on 3/23/2023) 30 tablet 11    ondansetron 4 mg/2 mL Soln       potassium chloride SA (K-DUR,KLOR-CON) 20 MEQ tablet Take 1 tablet (20 mEq total) by mouth 2 (two) times daily. (Patient not taking: Reported on 3/23/2023) 180 tablet 3    propofoL (DIPRIVAN) 10 mg/mL infusion          Please address this information as you see fit.  Feel free to contact us if you have any questions or require assistance.    Marisel Cruz  [phone]              .

## 2023-03-23 NOTE — H&P
[place] - Emergency Dept  Hospital Medicine  History & Physical    Patient Name: Tasha Hawley  MRN: [id number]  Patient Class: OP- Observation  Admission Date: 3/22/2023  Attending Physician: Bharti Sullivan MD   Primary Care Provider: Mesfin Hodges Ii, MD         Patient information was obtained from patient and ER records.     Subjective:     Principal Problem:<principal problem not specified>    Chief Complaint:   Chief Complaint   Patient presents with    Shortness of Breath     Pt c/o SOB x 3 days. Speaks in complete sentences. Recently Dx w/ staph infection and started taking ABX yesterday. No obvious signs of allergic reaction. +A/O x 4 w/ ABCs intact, NAD. Pt on O2 @ 2 LPM. ETCO2 50. VSS. EMS pulse ox not working/ unknown room air PTA.        HPI: 67 year old female with a history of CHF, cirrhosis, meadows dependence presents with shortness of breath, myalgias, and lower extremity edema. The swelling has worsened and now there is redness. She says she does not wear oxygen at home. No chest pain. Reports recently seen at [facility] for UTI and was started on dicloxacillin. She says she had diarrhea as well but this was before the abx initiation.  UA appeared grossly infected 3 days ago however no growth to date on urine culture.  Patient with lower extremity edema.  Given 80 mg Lasix in ED. chest x-ray with interstitial edema.  Patient newly on O2 requirements.  Troponin negative.  BNP negative.  Patient will be admitted to Hospital Medicine      Past Medical History:   Diagnosis Date    Anticoagulant long-term use     Anxiety     Arthritis     Bilateral lower extremity edema     severe chronic    Carotid artery occlusion     Cataract     CHF (congestive heart failure)     Coronary artery disease     subtotalled LAD with collateral    Depression     Fever blister     Hard of hearing     Hypokalemia 1/9/2023    Hyponatremia 2/4/2022    Hypothyroid     Iron deficiency anemia     Lumbar  radiculopathy     with chronic pain    Ocular migraine     Renal disorder     Sleep apnea     cpap       Past Surgical History:   Procedure Laterality Date    ADENOIDECTOMY      BACK SURGERY      x 12    CARDIAC CATHETERIZATION  2016    subtotalled LAD with right to left collaterals    CATARACT EXTRACTION W/  INTRAOCULAR LENS IMPLANT Left     Dr Coleman     CYSTOSCOPIC LITHOLAPAXY N/A 6/27/2019    Procedure: CYSTOLITHOLAPAXY;  Surgeon: Shireen Mayo MD;  Location: [facility] OR 2ND FLR;  Service: Urology;  Laterality: N/A;    CYSTOSCOPIC LITHOLAPAXY N/A 9/3/2019    Procedure: CYSTOLITHOLAPAXY;  Surgeon: Shireen Mayo MD;  Location: [facility] OR 2ND FLR;  Service: Urology;  Laterality: N/A;    CYSTOSCOPY N/A 7/13/2021    Procedure: CYSTOSCOPY;  Surgeon: Shireen Mayo MD;  Location: [facility] OR 1ST FLR;  Service: Urology;  Laterality: N/A;    CYSTOSCOPY  11/16/2021    Procedure: CYSTOSCOPY;  Surgeon: Shireen Mayo MD;  Location: [facility] OR 1ST FLR;  Service: Urology;;    CYSTOSCOPY  7/19/2022    Procedure: CYSTOSCOPY;  Surgeon: Shireen Mayo MD;  Location: [facility] OR 1ST FLR;  Service: Urology;;    CYSTOSCOPY WITH INJECTION OF PERIURETHRAL BULKING AGENT  7/19/2022    Procedure: CYSTOSCOPY, WITH PERIURETHRAL BULKING AGENT INJECTION-MACROPLASTIQUE;  Surgeon: Shireen Mayo MD;  Location: [facility] OR 1ST FLR;  Service: Urology;;    CYSTOSCOPY,WITH BOTULINUM TOXIN INJECTION N/A 12/13/2022    Procedure: CYSTOSCOPY,WITH BOTULINUM TOXIN INJECTION;  Surgeon: Shireen Mayo MD;  Location: [facility] OR 1ST FLR;  Service: Urology;  Laterality: N/A;  300 U    ESOPHAGOGASTRODUODENOSCOPY N/A 5/23/2018    Procedure: ESOPHAGOGASTRODUODENOSCOPY (EGD);  Surgeon: Prince Vance MD;  Location: [facility] (4TH FLR);  Service: Endoscopy;  Laterality: N/A;  r/s 'd per Dr. Vance due to family emergency- ER    HYSTERECTOMY  1975    endometriosis    INJECTION OF BOTULINUM TOXIN TYPE A  7/13/2021    Procedure: INJECTION,  BOTULINUM TOXIN, 200units;  Surgeon: Shireen Mayo MD;  Location: [facility] OR [address] [place] [address];  Service: Urology;;    INJECTION OF BOTULINUM TOXIN TYPE A  11/16/2021    Procedure: INJECTION, BOTULINUM TOXIN, 200units;  Surgeon: Shireen Mayo MD;  [facility];  Service: Urology;;    INJECTION OF BOTULINUM TOXIN TYPE A  7/19/2022    Procedure: INJECTION, BOTULINUM TOXIN, 300 units ;  Surgeon: Shireen Mayo MD;  [facility];  Service: Urology;;    INSERTION, SUPRAPUBIC CATHETER N/A 12/13/2022    Procedure: INSERTION, SUPRAPUBIC CATHETER;  Surgeon: Shireen Mayo MD;  Location: [facility] OR [address] [place] [address];  Service: Urology;  Laterality: N/A;  exchange    pain pump placement      SQ Dilaudid Pump managed by Dr. Hillman, Pain Management    REMOVAL OF BONE SPUR OF FOOT Bilateral 9/16/2022    Procedure: EXCISION ARTHRITIC BONE, BILATERAL FOOT;  Surgeon: NICOLA Mcgrath;  Location: [facility] OR;  Service: Podiatry;  Laterality: Bilateral;    REPLACEMENT OF CATHETER N/A 10/31/2019    Procedure: REPLACEMENT, CATHETER-SUPRAPUBIC;  Surgeon: Shireen Mayo MD;  Location: [facility] OR [address] [place] [address];  Service: Urology;  Laterality: N/A;    SPINAL CORD STIMULATOR REMOVAL      before [place]    SPINE SURGERY  5-13-13    CERVICAL FUSION    TONSILLECTOMY         Review of patient's allergies indicates:   Allergen Reactions    (d)-limonene flavor      Other reaction(s): difficult intubation  Other reaction(s): Difficulty breathing    Bactrim [sulfamethoxazole-trimethoprim] Anaphylaxis    Benadryl [diphenhydramine hcl] Shortness Of Breath    Fentanyl Itching, Nausea And Vomiting and Swelling             Imitrex [sumatriptan succinate] Shortness Of Breath    Percocet [oxycodone-acetaminophen] Shortness Of Breath    Topamax [topiramate] Shortness Of Breath    Vancomycin Anaphylaxis     Rash    Butorphanol tartrate     Darvocet a500 [propoxyphene n-acetaminophen]      Other reaction(s): Difficulty  breathing    Evening primrose (oenothera biennis)     White petrolatum-zinc     Zinc oxide-white petrolatum      Other reaction(s): Difficulty breathing    Latex, natural rubber Itching and Rash    Phenytoin Rash and Other (See Comments)     Trouble breathing       No current facility-administered medications on file prior to encounter.     Current Outpatient Medications on File Prior to Encounter   Medication Sig    aspirin (ECOTRIN) 81 MG EC tablet Take 1 tablet (81 mg total) by mouth once daily.    atorvastatin (LIPITOR) 80 MG tablet TAKE ONE TABLET BY MOUTH EVERY DAY    butalbital-acetaminophen-caffeine -40 mg (FIORICET, ESGIC) -40 mg per tablet Take 1 tablet by mouth daily as needed.    dicloxacillin (DYNAPEN) 500 MG capsule Take 1 capsule (500 mg total) by mouth 4 (four) times daily. for 8 days    ferrous sulfate 325 (65 FE) MG EC tablet Take 1 tablet (325 mg total) by mouth every other day.    FLUoxetine 20 MG capsule Take 3 capsules (60 mg total) by mouth once daily.    furosemide (LASIX) 40 MG tablet Take 1 tablet (40 mg total) by mouth once daily.    HYDROcodone-acetaminophen (NORCO) 5-325 mg per tablet Take 1 tablet by mouth every 8 (eight) hours as needed (breakthrough).    hydrocortisone (CORTEF) 10 MG Tab Take 1 tablet (10 mg total) by mouth once daily.    hydrocortisone (CORTEF) 5 MG Tab Take 1 tablet (5 mg total) by mouth every evening.    intrathecal pain pump compound Hydromorphone (7.5 mg/mL) infusion at 3.6799 mg/day (0.1533 mg/hr) out of a total reservoir volume of 37.3 mL  Pump filled every 2 months    levothyroxine (SYNTHROID) 137 MCG Tab tablet Take 1 tablet (137 mcg total) by mouth before breakfast.    LIDOcaine (LIDODERM) 5 % Place 1 patch onto the skin once daily. Remove & Discard patch within 12 hours or as directed by MD Althea bailey (MYRBETRIQ) 50 mg Tb24 Take 1 tablet (50 mg total) by mouth once daily.    nitroGLYCERIN (NITROSTAT) 0.4 MG SL tablet Place  1 tablet (0.4 mg total) under the tongue every 5 (five) minutes as needed for Chest pain.    pantoprazole (PROTONIX) 40 MG tablet Take 1 tablet (40 mg total) by mouth once daily.    potassium chloride SA (K-DUR,KLOR-CON) 20 MEQ tablet Take 1 tablet (20 mEq total) by mouth 2 (two) times daily.    promethazine (PHENERGAN) 25 MG tablet Take 25 mg by mouth every 6 (six) hours as needed for Nausea.    QUEtiapine (SEROQUEL) 100 MG Tab TAKE 2 TABLETS (200 MG) BY MOUTH NIGHTLY    senna (SENNA LAX) 8.6 mg tablet Take 2 tablets by mouth 2 (two) times daily.    traZODone (DESYREL) 300 MG tablet Take 1 tablet (300 mg total) by mouth every evening.     Family History       Problem Relation (Age of Onset)    Cancer Mother (55), Father    Cirrhosis Paternal Aunt, Paternal Uncle    Colon cancer Maternal Uncle    Esophageal cancer Father    Heart disease Maternal Uncle    Liver disease Paternal Aunt, Paternal Uncle    No Known Problems Brother, Brother, Sister, Maternal Aunt, Maternal Grandfather, Paternal Grandmother, Paternal Grandfather    Parkinsonism Maternal Grandmother    Tremor Maternal Grandmother          Tobacco Use    Smoking status: Never    Smokeless tobacco: Never   Substance and Sexual Activity    Alcohol use: Never    Drug use: No    Sexual activity: Never     Partners: Male     Review of Systems   Respiratory:  Positive for cough and shortness of breath.    Musculoskeletal:  Positive for back pain.   All other systems reviewed and are negative.  Objective:     Vital Signs (Most Recent):  Temp: 98.4 °F (36.9 °C) (03/22/23 1942)  Pulse: 68 (03/22/23 2335)  Resp: 18 (03/22/23 2335)  BP: (!) 112/56 (03/22/23 2335)  SpO2: 96 % (03/22/23 2335)   Vital Signs (24h Range):  Temp:  [98.1 °F (36.7 °C)-98.4 °F (36.9 °C)] 98.4 °F (36.9 °C)  Pulse:  [68-76] 68  Resp:  [18-26] 18  SpO2:  [94 %-96 %] 96 %  BP: (110-129)/(56-71) 112/56        There is no height or weight on file to calculate BMI.    Physical  Exam  Constitutional:       Appearance: She is ill-appearing.   HENT:      Head: Normocephalic.      Mouth/Throat:      Mouth: Mucous membranes are dry.   Eyes:      Pupils: Pupils are equal, round, and reactive to light.   Cardiovascular:      Rate and Rhythm: Normal rate and regular rhythm.      Pulses: Normal pulses.      Heart sounds: Normal heart sounds.   Pulmonary:      Breath sounds: Wheezing and rhonchi present.   Abdominal:      General: Bowel sounds are normal.   Musculoskeletal:         General: Normal range of motion.      Cervical back: Normal range of motion.      Right lower leg: Edema (3+ edema) present.      Left lower leg: Edema (3+ edema) present.   Skin:     General: Skin is dry.      Capillary Refill: Capillary refill takes less than 2 seconds.      Findings: Erythema present.   Neurological:      General: No focal deficit present.      Mental Status: She is alert and oriented to person, place, and time. Mental status is at baseline.   Psychiatric:         Mood and Affect: Mood normal.         Behavior: Behavior normal.         CRANIAL NERVES     CN III, IV, VI   Pupils are equal, round, and reactive to light.     Significant Labs: All pertinent labs within the past 24 hours have been reviewed.  Recent Lab Results         03/22/23 1946        Albumin 3.4       Alkaline Phosphatase 107       ALT 9       Anion Gap 12       AST 13       Baso # 0.00       Basophil % 0.0       BILIRUBIN TOTAL 0.5  Comment: For infants and newborns, interpretation of results should be based  on gestational age, weight and in agreement with clinical  observations.    Premature Infant recommended reference ranges:  Up to 24 hours.............<8.0 mg/dL  Up to 48 hours............<12.0 mg/dL  3-5 days..................<15.0 mg/dL  6-29 days.................<15.0 mg/dL         BNP 44  Comment: Values of less than 100 pg/ml are consistent with non-CHF populations.       BUN 6       Calcium 9.0       Chloride 95       CO2  33       Creatinine 1.0       Differential Method Automated       eGFR >60       Eos # 0.2       Eosinophil % 3.8       Glucose 92       Gran # (ANC) 4.4       Gran % 83.2       Hematocrit 33.1       Hemoglobin 10.3       Immature Grans (Abs) 0.02  Comment: Mild elevation in immature granulocytes is non specific and   can be seen in a variety of conditions including stress response,   acute inflammation, trauma and pregnancy. Correlation with other   laboratory and clinical findings is essential.         Immature Granulocytes 0.4       Lymph # 0.4       Lymph % 6.8       MCH 26.3       MCHC 31.1       MCV 84       Mono # 0.3       Mono % 5.8       MPV 9.0       nRBC 0       Platelets 266       Potassium 3.2       PROTEIN TOTAL 7.4       RBC 3.92       RDW 15.7       Sodium 140       Troponin I <0.006  Comment: The reference interval for Troponin I represents the 99th percentile   cutoff   for our facility and is consistent with 3rd generation assay   performance.         WBC 5.32               Significant Imaging: I have reviewed all pertinent imaging results/findings within the past 24 hours.  I have reviewed and interpreted all pertinent imaging results/findings within the past 24 hours.    Assessment/Plan:     Adrenal insufficiency  Resume cortef      UTI (urinary tract infection)  UTI diagnosed at outside facility  Started on dicloxacillin  Patient has chronic Motley catheter  No growth to date on urine culture  Will continue antibiotics as patient is immunosuppressed with chronic steroid therapy    Lymphedema of both lower extremities    Ultrasound negative for DVT  Given 80 mg Lasix in ED  Will start 40 mg daily  BNP normal checks x-ray with interstitial edema    Neurogenic bladder  Chronic Motley catheter      SOB (shortness of breath)  Chest x-ray without pneumonia  Patient has history of COPD  New O2 requirements  Supplemental O2 as needed, may need O2 eval and discharge        Chronic pain syndrome  hAs  intrathecal pain pump  Given 1 Dose of Dilaudid in ED      VTE Risk Mitigation (From admission, onward)         Ordered     enoxaparin injection 40 mg  Daily         03/23/23 0156     IP VTE HIGH RISK PATIENT  Once         03/23/23 0156                     On 03/23/2023, patient should be placed in hospital observation services under my care in collaboration with Dr. Sullivan.      Betito Patel, NP  Department of Hospital Medicine  [place] - Emergency Dept

## 2023-03-24 ENCOUNTER — TELEPHONE (OUTPATIENT)
Dept: UROLOGY | Facility: CLINIC | Age: 68
End: 2023-03-24
Payer: MEDICARE

## 2023-03-24 LAB
ALLENS TEST: ABNORMAL
ANION GAP SERPL CALC-SCNC: 6 MMOL/L (ref 8–16)
BUN SERPL-MCNC: 7 MG/DL (ref 8–23)
CALCIUM SERPL-MCNC: 8.5 MG/DL (ref 8.7–10.5)
CHLORIDE SERPL-SCNC: 96 MMOL/L (ref 95–110)
CO2 SERPL-SCNC: 37 MMOL/L (ref 23–29)
CREAT SERPL-MCNC: 0.9 MG/DL (ref 0.5–1.4)
DELSYS: ABNORMAL
EST. GFR  (NO RACE VARIABLE): >60 ML/MIN/1.73 M^2
GLUCOSE SERPL-MCNC: 107 MG/DL (ref 70–110)
HCO3 UR-SCNC: 38.1 MMOL/L (ref 24–28)
HCT VFR BLD CALC: 32 %PCV (ref 36–54)
HGB BLD-MCNC: 11 G/DL
MAGNESIUM SERPL-MCNC: 2 MG/DL (ref 1.6–2.6)
PCO2 BLDA: 64.8 MMHG (ref 35–45)
PH SMN: 7.38 [PH] (ref 7.35–7.45)
PO2 BLDA: 73 MMHG (ref 80–100)
POC BE: 13 MMOL/L
POC SATURATED O2: 93 % (ref 95–100)
POC TCO2: 40 MMOL/L (ref 23–27)
POTASSIUM BLD-SCNC: 3.8 MMOL/L (ref 3.5–5.1)
POTASSIUM SERPL-SCNC: 3.4 MMOL/L (ref 3.5–5.1)
SAMPLE: ABNORMAL
SITE: ABNORMAL
SODIUM BLD-SCNC: 140 MMOL/L (ref 136–145)
SODIUM SERPL-SCNC: 139 MMOL/L (ref 136–145)
T4 FREE SERPL-MCNC: 0.72 NG/DL (ref 0.71–1.51)
TSH SERPL DL<=0.005 MIU/L-ACNC: 40.19 UIU/ML (ref 0.4–4)

## 2023-03-24 PROCEDURE — 94761 N-INVAS EAR/PLS OXIMETRY MLT: CPT

## 2023-03-24 PROCEDURE — 84443 ASSAY THYROID STIM HORMONE: CPT | Mod: HCNC | Performed by: STUDENT IN AN ORGANIZED HEALTH CARE EDUCATION/TRAINING PROGRAM

## 2023-03-24 PROCEDURE — 84439 ASSAY OF FREE THYROXINE: CPT | Mod: HCNC | Performed by: STUDENT IN AN ORGANIZED HEALTH CARE EDUCATION/TRAINING PROGRAM

## 2023-03-24 PROCEDURE — 94660 CPAP INITIATION&MGMT: CPT

## 2023-03-24 PROCEDURE — 63600175 PHARM REV CODE 636 W HCPCS: Mod: HCNC | Performed by: REGISTERED NURSE

## 2023-03-24 PROCEDURE — 63600175 PHARM REV CODE 636 W HCPCS: Mod: HCNC | Performed by: NURSE PRACTITIONER

## 2023-03-24 PROCEDURE — 80048 BASIC METABOLIC PNL TOTAL CA: CPT | Mod: HCNC | Performed by: STUDENT IN AN ORGANIZED HEALTH CARE EDUCATION/TRAINING PROGRAM

## 2023-03-24 PROCEDURE — 25500020 PHARM REV CODE 255: Performed by: STUDENT IN AN ORGANIZED HEALTH CARE EDUCATION/TRAINING PROGRAM

## 2023-03-24 PROCEDURE — 25000242 PHARM REV CODE 250 ALT 637 W/ HCPCS: Performed by: STUDENT IN AN ORGANIZED HEALTH CARE EDUCATION/TRAINING PROGRAM

## 2023-03-24 PROCEDURE — 25000003 PHARM REV CODE 250: Performed by: REGISTERED NURSE

## 2023-03-24 PROCEDURE — 25000003 PHARM REV CODE 250: Mod: HCNC | Performed by: STUDENT IN AN ORGANIZED HEALTH CARE EDUCATION/TRAINING PROGRAM

## 2023-03-24 PROCEDURE — 82803 BLOOD GASES ANY COMBINATION: CPT

## 2023-03-24 PROCEDURE — 97530 THERAPEUTIC ACTIVITIES: CPT

## 2023-03-24 PROCEDURE — 97165 OT EVAL LOW COMPLEX 30 MIN: CPT

## 2023-03-24 PROCEDURE — 83735 ASSAY OF MAGNESIUM: CPT | Mod: HCNC | Performed by: STUDENT IN AN ORGANIZED HEALTH CARE EDUCATION/TRAINING PROGRAM

## 2023-03-24 PROCEDURE — 94799 UNLISTED PULMONARY SVC/PX: CPT

## 2023-03-24 PROCEDURE — 97535 SELF CARE MNGMENT TRAINING: CPT

## 2023-03-24 PROCEDURE — 99900035 HC TECH TIME PER 15 MIN (STAT)

## 2023-03-24 PROCEDURE — 63600175 PHARM REV CODE 636 W HCPCS: Performed by: STUDENT IN AN ORGANIZED HEALTH CARE EDUCATION/TRAINING PROGRAM

## 2023-03-24 PROCEDURE — 27000221 HC OXYGEN, UP TO 24 HOURS

## 2023-03-24 PROCEDURE — 36600 WITHDRAWAL OF ARTERIAL BLOOD: CPT

## 2023-03-24 PROCEDURE — 25000003 PHARM REV CODE 250: Mod: HCNC | Performed by: NURSE PRACTITIONER

## 2023-03-24 PROCEDURE — 94640 AIRWAY INHALATION TREATMENT: CPT

## 2023-03-24 PROCEDURE — 11000001 HC ACUTE MED/SURG PRIVATE ROOM: Mod: HCNC

## 2023-03-24 PROCEDURE — 36415 COLL VENOUS BLD VENIPUNCTURE: CPT | Mod: HCNC | Performed by: STUDENT IN AN ORGANIZED HEALTH CARE EDUCATION/TRAINING PROGRAM

## 2023-03-24 RX ORDER — POTASSIUM CHLORIDE 7.45 MG/ML
10 INJECTION INTRAVENOUS ONCE
Status: COMPLETED | OUTPATIENT
Start: 2023-03-24 | End: 2023-03-24

## 2023-03-24 RX ORDER — MUPIROCIN 20 MG/G
OINTMENT TOPICAL 2 TIMES DAILY
Status: COMPLETED | OUTPATIENT
Start: 2023-03-24 | End: 2023-03-29

## 2023-03-24 RX ORDER — FUROSEMIDE 10 MG/ML
20 INJECTION INTRAMUSCULAR; INTRAVENOUS 2 TIMES DAILY
Status: DISCONTINUED | OUTPATIENT
Start: 2023-03-24 | End: 2023-03-24

## 2023-03-24 RX ORDER — HYDROCORTISONE 5 MG/1
5 TABLET ORAL EVERY EVENING
Status: DISCONTINUED | OUTPATIENT
Start: 2023-03-24 | End: 2023-04-03 | Stop reason: HOSPADM

## 2023-03-24 RX ORDER — FLUDROCORTISONE ACETATE 0.1 MG/1
100 TABLET ORAL DAILY
Status: DISCONTINUED | OUTPATIENT
Start: 2023-03-24 | End: 2023-04-03 | Stop reason: HOSPADM

## 2023-03-24 RX ADMIN — FUROSEMIDE 20 MG: 10 INJECTION, SOLUTION INTRAMUSCULAR; INTRAVENOUS at 10:03

## 2023-03-24 RX ADMIN — HYDROCODONE BITARTRATE AND ACETAMINOPHEN 1 TABLET: 5; 325 TABLET ORAL at 03:03

## 2023-03-24 RX ADMIN — ENOXAPARIN SODIUM 40 MG: 40 INJECTION SUBCUTANEOUS at 05:03

## 2023-03-24 RX ADMIN — BUTALBITAL, ACETAMINOPHEN, AND CAFFEINE 1 TABLET: 50; 325; 40 TABLET ORAL at 09:03

## 2023-03-24 RX ADMIN — MUPIROCIN: 20 OINTMENT TOPICAL at 08:03

## 2023-03-24 RX ADMIN — HYDROCODONE BITARTRATE AND ACETAMINOPHEN 1 TABLET: 5; 325 TABLET ORAL at 12:03

## 2023-03-24 RX ADMIN — ATORVASTATIN CALCIUM 80 MG: 40 TABLET, FILM COATED ORAL at 09:03

## 2023-03-24 RX ADMIN — PANTOPRAZOLE SODIUM 40 MG: 40 TABLET, DELAYED RELEASE ORAL at 09:03

## 2023-03-24 RX ADMIN — QUETIAPINE FUMARATE 200 MG: 100 TABLET ORAL at 08:03

## 2023-03-24 RX ADMIN — CEPHALEXIN 500 MG: 500 CAPSULE ORAL at 12:03

## 2023-03-24 RX ADMIN — CEFTRIAXONE 1 G: 1 INJECTION, POWDER, FOR SOLUTION INTRAMUSCULAR; INTRAVENOUS at 12:03

## 2023-03-24 RX ADMIN — ASPIRIN 81 MG: 81 TABLET, COATED ORAL at 09:03

## 2023-03-24 RX ADMIN — HYDROCORTISONE 10 MG: 5 TABLET ORAL at 09:03

## 2023-03-24 RX ADMIN — IOHEXOL 100 ML: 350 INJECTION, SOLUTION INTRAVENOUS at 11:03

## 2023-03-24 RX ADMIN — HYDROCORTISONE 5 MG: 5 TABLET ORAL at 05:03

## 2023-03-24 RX ADMIN — TRAZODONE HYDROCHLORIDE 50 MG: 50 TABLET ORAL at 08:03

## 2023-03-24 RX ADMIN — LEVOTHYROXINE SODIUM 137 MCG: 137 TABLET ORAL at 05:03

## 2023-03-24 RX ADMIN — FLUDROCORTISONE ACETATE 100 MCG: 0.1 TABLET ORAL at 02:03

## 2023-03-24 RX ADMIN — TIOTROPIUM BROMIDE INHALATION SPRAY 2 PUFF: 3.12 SPRAY, METERED RESPIRATORY (INHALATION) at 01:03

## 2023-03-24 RX ADMIN — CEPHALEXIN 500 MG: 500 CAPSULE ORAL at 05:03

## 2023-03-24 RX ADMIN — FLUOXETINE HYDROCHLORIDE 60 MG: 20 CAPSULE ORAL at 09:03

## 2023-03-24 RX ADMIN — POTASSIUM CHLORIDE 10 MEQ: 7.46 INJECTION, SOLUTION INTRAVENOUS at 09:03

## 2023-03-24 NOTE — TELEPHONE ENCOUNTER
Rigoberto called in to notify the office that suprapubic catheter change and ucx could not be done because pt is currently admitted.

## 2023-03-24 NOTE — ASSESSMENT & PLAN NOTE
Ultrasound negative for DVT  Given 80 mg Lasix in ED  Continue gentle diuresis    Redness, tenderness and swelling suggestive of cellulitis the patient does not have systemic signs of infection.  She is chronically on steroids and therefore may not mount a same immune response.  Procalcitonin low is reassuring.  For now, we will continue ceftriaxone for cellulitis

## 2023-03-24 NOTE — PLAN OF CARE
Occupational therapy evaluation completed, skilled acute OT services to follow. Patient with history of chronic pain and chronic neurogenic bladder at baseline with poor/limited activity tolerance on evaluation, limited to side stepping eob with increased low back pain/discomfort, max assist for lower body dressing, and inability to tolerate standing for  greater than 30 seconds with BUE support. Patient indicating dizziness with transition movements but negative for orthostatics; she reports signficant pain to touch on midlow back post mobility efforts. Patient also with reports of prior recent hx of at least 5 recalled falls within past 3 months. At this time recommend post acute therapy as patient is at risk for recurrent falls and states that her support /spouse whom she lives with  has compromised health himself. Updated discharge disposition and DME recommendations to be provided pending progression. Secure chat sent to Dr. Sullivan regarding patient's recent hx of falls, report of significant back pain, in consideration for if imaging is potentially warranted.    Problem: Occupational Therapy  Goal: Occupational Therapy Goal  Description: Goals to be met by: 4/21/2023     Patient will increase functional independence with ADLs by performing:    UE Dressing with Set-up Assistance with overhead clothing without complaints of increased lumbar pain  LE Dressing with Minimal Assistance to return to PLOF with incorporation of improved body mechanics (ie. Avoid trunk twisting 2/2 hx of back pain)  Toileting from toilet with Stand-by Assistance for hygiene and clothing management after bowel movement.   Toilet transfer to toilet with Stand-by Assistance with incorporation of body alignment principles.  Standing x 3 minutes with BUE support as needed, SBA for balance, and without adverse change in vital signs or reports of dizziness  Functional mobility in room to access bathroom / closet area with SBA, use of least  restrictive assistive device, and incorporated energy conservation techniques.    Outcome: Ongoing, Progressing

## 2023-03-24 NOTE — TELEPHONE ENCOUNTER
Rigoberto Iyer Staff; Shireen Mayo MD    Good afternoon,     Tasha Hawley is in [facility] hospital. She was placed in observation on 3/22/2023. We were unable to change patient's catheter and obtain urine cx due to her hospitalization.     Rigoberto Esquivel LPN   Patient Care Manager Assistant       [facility] [address], [facility] [place] [address]     Phone: [phone]  Fax: [phone]

## 2023-03-24 NOTE — PT/OT/SLP EVAL
Occupational Therapy   Evaluation and Treatment    Name: Tasha Hawley  MRN: [id number]  Admitting Diagnosis: Acute hypoxemic respiratory failure  Recent Surgery: * No surgery found *      Recommendations:     Discharge Recommendations: other (see comments) (post acute therapy; skilled nursing facility)  Discharge Equipment Recommendations:  other (see comments) (tbd)  Barriers to discharge:  Decreased caregiver support, Other (Comment) (pain; limited performance)    Assessment:     Tasha Hawley is a 67 y.o. female with a medical diagnosis of Acute hypoxemic respiratory failure.  She presents with ..The primary encounter diagnosis was Heart failure, unspecified HF chronicity, unspecified heart failure type. Diagnoses of Acute hypoxemic respiratory failure, Shortness of breath, and Swelling were also pertinent to this visit.  . Performance deficits affecting function: weakness, impaired endurance, impaired sensation, impaired self care skills, impaired functional mobility, gait instability, impaired balance, pain, decreased safety awareness, decreased lower extremity function, impaired skin, edema, decreased upper extremity function, impaired cognition, decreased ROM.      Occupational therapy evaluation completed. Patient with history of chronic pain and chronic neurogenic bladder at baseline with poor/limited activity tolerance on evaluation, limited to side stepping eob with increased low back pain/discomfort, max assist for lower body dressing, and inability to tolerate standing for  greater than 30 seconds with BUE support. Patient indicating dizziness with transition movements but negative for orthostatics; she reports signficant pain to touch on midlow back post mobility efforts. Patient also with reports of prior recent hx of at least 5 recalled falls within past 3 months. At this time recommend post acute therapy as patient is at risk for recurrent falls and states that her support /spouse whom she  "lives with  has compromised health himself. Updated discharge disposition and DME recommendations to be provided pending progression. Secure chat sent to Dr. Sullivan regarding patient's recent hx of falls, report of significant back pain, in consideration for if imaging is potentially warranted.    Rehab Prognosis: Fair; patient would benefit from acute skilled OT services to address these deficits and reach maximum level of function.       Plan:     Patient to be seen 4 x/week to address the above listed problems via self-care/home management, therapeutic activities, therapeutic exercises, neuromuscular re-education  Plan of Care Expires: 04/21/23  Plan of Care Reviewed with: patient    Subjective     Chief Complaint: Patient complaining of dizziness with transitions. Increased back pain with movement  Patient/Family Comments/goals: Patient reports feeling not well, requesting to return to bed after brief activity    Occupational Profile: increased time for patient to answer profile answers, needing clarification throughout  Living Environment: Patient resides [place]  single story home, ramp access, tub shower, with tub transfer bench  Previous level of function: Prior to admission patient with recent prior hospitalization in which she reports decreased sustained conditioning since that hospitalization. Patient was "managing" at home, but required some  assist from spouse for lower body dressing, bathing, and assist for shower transfers. Patient completing household mobility with rolling walker or rollator with modified independence (describes occasional furniture surfing), toileting self including emptying own catheter. Prior to January 2023 she was still going out in the community using motorized cart to shop with spouse; patient relies on spouse for driving. Patient has inconsistent reports on how she was managing her medication at home, stating that her spouse has "memory problems" and reports her decreased comfort " "in management of meds. Reports that she receives "treatment" for her BLE lymphema but is unable to clarify further.  Equipment Used at Home: bedside commode, walker, rolling, wheelchair, rollator, other (see comments) (tub transfer bench)  Assistance upon Discharge: unknown level of assist able to  be provided; thus recommend post acute therapy at this time.    Pain/Comfort:  Pain Rating 1: other (see comments) (moderate pain in BLE and low back)  Pain Addressed 1: Pre-medicate for activity, Cessation of Activity, Nurse notified, Reposition  Pain Rating Post-Intervention 1: other (see comments) (increased low back pain with movement; decreases upon cessation of activity and after repositioned)  Pain Addressed 2: Pre-medicate for activity, Reposition, Distraction, Cessation of Activity, Nurse notified      Objective:     Communicated with: nursing prior to session.  Patient found HOB elevated with bed alarm, telemetry, meadows catheter, oxygen upon OT entry to room.    General Precautions: Standard, fall, other (see comments) (incorporated spinal precautions in consideration of patient's prior hx of pain in back)  Orthopedic Precautions: N/A  Braces: N/A  Respiratory Status: Nasal cannula, flow 2  L/min    Occupational Performance:    Bed Mobility:    Patient completed Rolling/Turning to Right with incorporated spinal precautions, min assist.  Patient completed Supine to Sit with increased time, moderate assist.  Patient completed Sit to Supine with maximal assist, with increased pain in bed, difficulty with BLE management    Functional Mobility/Transfers:  Patient completed Sit <> Stand Transfer with minimum assistance  with  rolling walker   Functional Mobility: sitting eob with SBA for sitting balance with forward shoulders. Unable to stand without UE support and min assist for balance. Guided for side steps to eob with rolling walker and moderate assist for walker management. Poor step clearance noted despite cues " "for pushing through BUE on walker.    Activities of Daily Living:  Grooming: stand by assistance seated  Upper Body Dressing: contact guard assistance ; discomfort in low back as barrier; initiated instruction for bringing clothing over elbows  Lower Body Dressing: maximal assistance ; able to assist in managing at hips from lying supine  Toileting: dependence ; indwelling catheter at baseline per chart review (however, patient states inability due to low energy)    Cognitive/Visual Perceptual:  Cognitive/Psychosocial Skills:     -       Oriented to: Person, Place, Time, and does not clarify situation   -       Follows Commands/attention:requires repeated direction, follows 1-2 step directions  -       Memory: sleepy thus difficult to assess formally; presents with decreased short term recall  -       Safety awareness/insight to disability: impaired   -       Mood/Affect/Coping skills/emotional control: tired and self reports "feel a little agitated today"    Physical Exam:  Postural examination/scapula alignment:    -       Rounded shoulders  -       Forward head  Skin integrity: reddened BLE, dry skin  Edema:  Moderate BLE  Motor Planning:    -       appears WFL  Upper Extremity Range of Motion:     -       Right Upper Extremity: WFL for ADL; from chart review appears proximal deficits at baseline  -       Left Upper Extremity: WFL for ADL; from chair review appears proximal deficits at baseline  Upper Extremity Strength:    -       Right Upper Extremity: MMT not performed 2/2 low back pain; observed to maintain antigravity position within her limited range  -       Left Upper Extremity: MMT not performed 2/2 low back pain; observed to maintain antigravity position within her limited range    AMPAC 6 Click ADL:  AMPAC Total Score: 14    Treatment & Education:  Patient educated on role of OT. Increased time required to gently awaken patient. Patient with need for repeat inquiries/ directions at time. Patient " indicating baseline pain in BLE and low back. Throughout evaluation/treatment SpO2 WFL on 2L and heartrate maintained in 50s-60s when checked with rest and activity. Patient guided for transition to eob via log roll technique 2/2 prior hx of chronic pain. Patient sat eob with SBA with increased alertness after position change. After sitting eob for ~2 minutes patient stating mild dizziness. BP checked in seated eob and noted at 105/62. Guided for pursed lip breathing with visual demonstration; redirective cues required. RN entered room to provide medications. Patient indicating decreased self management of medications at home. Patient guided for standing with min assist, increased pain in  low back. Standing BP checked and noted at 118/63. Moderate assist for walker management for minimal steps to eob. Patient with reports of having increased recent falls at home. Requesting return to lying due to feeling not well. Max assist for return assist to bed using log roll method.     Patient left HOB elevated with all lines intact, call button in reach, bed alarm on, nursing notified, and RN present  Secure chat sent to Dr. Sullivan regarding concern for patient due to recent falls (patient recalling at least 5 recently) and increased back pain with movement.  GOALS:   Multidisciplinary Problems       Occupational Therapy Goals          Problem: Occupational Therapy    Goal Priority Disciplines Outcome Interventions   Occupational Therapy Goal     OT, PT/OT Ongoing, Progressing    Description: Goals to be met by: 4/21/2023     Patient will increase functional independence with ADLs by performing:    UE Dressing with Set-up Assistance with overhead clothing without complaints of increased lumbar pain  LE Dressing with Minimal Assistance to return to PLOF with incorporation of improved body mechanics (ie. Avoid trunk twisting 2/2 hx of back pain)  Toileting from toilet with Stand-by Assistance for hygiene and clothing  management after bowel movement.   Toilet transfer to toilet with Stand-by Assistance with incorporation of body alignment principles.  Standing x 3 minutes with BUE support as needed, SBA for balance, and without adverse change in vital signs or reports of dizziness  Functional mobility in room to access bathroom / closet area with SBA, use of least restrictive assistive device, and incorporated energy conservation techniques.                         History:     Past Medical History:   Diagnosis Date    Anticoagulant long-term use     Anxiety     Arthritis     Bilateral lower extremity edema     severe chronic    Carotid artery occlusion     Cataract     CHF (congestive heart failure)     Coronary artery disease     subtotalled LAD with collateral    Depression     Fever blister     Hard of hearing     Hypokalemia 1/9/2023    Hyponatremia 2/4/2022    Hypothyroid     Iron deficiency anemia     Lumbar radiculopathy     with chronic pain    Ocular migraine     Renal disorder     Sleep apnea     cpap         Past Surgical History:   Procedure Laterality Date    ADENOIDECTOMY      BACK SURGERY      x 12    CARDIAC CATHETERIZATION  2016    subtotalled LAD with right to left collaterals    CATARACT EXTRACTION W/  INTRAOCULAR LENS IMPLANT Left     Dr Coleman     CYSTOSCOPIC LITHOLAPAXY N/A 6/27/2019    Procedure: CYSTOLITHOLAPAXY;  Surgeon: Shireen Mayo MD;  Location: [facility] OR [address] [place] [address];  Service: Urology;  Laterality: N/A;    CYSTOSCOPIC LITHOLAPAXY N/A 9/3/2019    Procedure: CYSTOLITHOLAPAXY;  Surgeon: Shireen Mayo MD;  [facility];  Service: Urology;  Laterality: N/A;    CYSTOSCOPY N/A 7/13/2021    Procedure: CYSTOSCOPY;  Surgeon: Shireen Mayo MD;  [facility];  Service: Urology;  Laterality: N/A;    CYSTOSCOPY  11/16/2021    Procedure: CYSTOSCOPY;  Surgeon: Shireen Maoy MD;  [facility];  Service: Urology;;    CYSTOSCOPY  7/19/2022    Procedure: CYSTOSCOPY;   Surgeon: Shireen Mayo MD;  [facility];  Service: Urology;;    CYSTOSCOPY WITH INJECTION OF PERIURETHRAL BULKING AGENT  7/19/2022    Procedure: CYSTOSCOPY, WITH PERIURETHRAL BULKING AGENT INJECTION-MACROPLASTIQUE;  Surgeon: Shireen Mayo MD;  [facility];  Service: Urology;;    CYSTOSCOPY,WITH BOTULINUM TOXIN INJECTION N/A 12/13/2022    Procedure: CYSTOSCOPY,WITH BOTULINUM TOXIN INJECTION;  Surgeon: Shireen Mayo MD;  [facility];  Service: Urology;  Laterality: N/A;  300 U    ESOPHAGOGASTRODUODENOSCOPY N/A 5/23/2018    Procedure: ESOPHAGOGASTRODUODENOSCOPY (EGD);  Surgeon: Prince Vance MD;  Location: [facility] (4TH FLR);  Service: Endoscopy;  Laterality: N/A;  r/s 'd per Dr. Vance due to family emergency- ER    HYSTERECTOMY  1975    endometriosis    INJECTION OF BOTULINUM TOXIN TYPE A  7/13/2021    Procedure: INJECTION, BOTULINUM TOXIN, 200units;  Surgeon: Shireen Mayo MD;  [facility];  Service: Urology;;    INJECTION OF BOTULINUM TOXIN TYPE A  11/16/2021    Procedure: INJECTION, BOTULINUM TOXIN, 200units;  Surgeon: Shireen Mayo MD;  [facility];  Service: Urology;;    INJECTION OF BOTULINUM TOXIN TYPE A  7/19/2022    Procedure: INJECTION, BOTULINUM TOXIN, 300 units ;  Surgeon: Shireen Mayo MD;  [address]  Service: Urology;;    INSERTION, SUPRAPUBIC CATHETER N/A 12/13/2022    Procedure: INSERTION, SUPRAPUBIC CATHETER;  Surgeon: Shireen Mayo MD;  [facility];  Service: Urology;  Laterality: N/A;  exchange    pain pump placement      SQ Dilaudid Pump managed by Dr. Hillman, Pain Management    REMOVAL OF BONE SPUR OF FOOT Bilateral 9/16/2022    Procedure: EXCISION ARTHRITIC BONE, BILATERAL FOOT;  Surgeon: Adam Mcguire DPM;  Location: [facility];  Service: Podiatry;  Laterality: Bilateral;    REPLACEMENT OF CATHETER N/A 10/31/2019    Procedure: REPLACEMENT,  CATHETER-SUPRAPUBIC;  Surgeon: Shireen Mayo MD;  Location: [facility] OR [address] [place] [address];  Service: Urology;  Laterality: N/A;    SPINAL CORD STIMULATOR REMOVAL      before [place]    SPINE SURGERY  5-13-13    CERVICAL FUSION    TONSILLECTOMY         Time Tracking:     OT Date of Treatment: 03/24/23  OT Start Time: 0916  OT Stop Time: 0947  OT Total Time (min): 31 min    Billable Minutes:Evaluation 8 min  Self Care/Home Management 8 min  Therapeutic Activity 15 min    3/24/2023

## 2023-03-24 NOTE — PLAN OF CARE
Pt AAOx4. PRN Norco given for pain. No N/V. Medications administered per MAR. On 2L NC. Cardiac monitoring maintained. Suprapubic cath in place, draining clear yellow urine. Bed alarm set, side rails raised, and call light in reach.

## 2023-03-24 NOTE — ASSESSMENT & PLAN NOTE
With intrathecal Dilaudid pump in place   Continue home dose p.r.n. Norco, Tylenol    Per patient back pain is chronic due to multiple past surgeries but patient reported worsening pain while working with PT and history of falls    Will get thoracolumbar spine x-ray to rule out fracture

## 2023-03-24 NOTE — ASSESSMENT & PLAN NOTE
Patient with Hypoxic Respiratory failure which is Acute.  she is not on home oxygen. Supplemental oxygen was provided and noted- Oxygen Concentration (%):  [28] 28.   Signs/symptoms of respiratory failure include- shortness of breath.     On 2 L oxygen via NC saturating 96%.  Suspect dyspnea and hypoxia multifactorial in setting of atelectasis, ?Mild volume overload (BNP maybe falsely low in obesity)    CTA chest ruled out PE, no large focal consolidation noted.  Procalcitonin negative less suggestive of pneumonia.  CT chest showed emphysema.  Not on home inhalers.  Suspect obstructive airway disease.  DuoNebs on back order.  Add Spiriva for now.  P.r.n. albuterol. Add ABG    Incentive spirometer.  Increase mobility.  Gentle IV diuresis for now, this can likely be switched to p.o. later today or tomorrow.  Suspect subjective dyspnea may be in setting of deconditioning or a systemic problem.  Patient reported lightheadedness while working with physical therapy which could be in setting of orthostasis as she was on suboptimal steroid dosing for adrenal insufficiency.  On chart review, discharged 01/2023 on total hydrocortisone 15 mg q.d..  Currently on 10 mg q.d..  Evening dose of hydrocortisone 5 mg added.  Fludrocortisone added.  Also noted on chart review, last TSH in 01/2023 was 51.  Repeat TSH pending.

## 2023-03-24 NOTE — ASSESSMENT & PLAN NOTE
Last TSH 51.8 in 01/2023    Continue levothyroxine at 137 mcg q.d. for now     TSH this hospital stay pending

## 2023-03-24 NOTE — SUBJECTIVE & OBJECTIVE
Interval History:  Documented urine output of 1600 cc.  This morning when she stood up to work with physical therapy, felt lightheaded and short of breath for which she was placed on 2 L oxygen via NC.  Unclear if she became hypoxic.  No orthostatics vitals noted.  Patient reports feeling fatigued and unwell.  Complains of subjective dyspnea and chest discomfort.    Patient reports being mostly bed-bound due to chronic back pain and fatigue.  Bilateral lower extremity ultrasound were negative for DVT.  Not tachycardic but given dizziness and dyspnea with decreased mobility, will get CTA chest to rule out PE.  Continue gentle diuresis, incentive spirometer.  On review of MAR, patient is currently on hydrocortisone 10 mg daily only for recent diagnosis of adrenal insufficiency for which she has not followed up with endocrinology as outpatient.  On review of last discharge summary 01/2023, patient was supposed to be on total 15 mg hydrocortisone with 5 mg in the evening. Evening dose added.     Review of Systems   Constitutional:  Positive for fatigue.   Respiratory:  Positive for shortness of breath.    Cardiovascular:  Positive for leg swelling.   Neurological:  Positive for light-headedness.   Objective:     Vital Signs (Most Recent):  Temp: 98.3 °F (36.8 °C) (03/24/23 0434)  Pulse: (!) 59 (03/24/23 0849)  Resp: 17 (03/24/23 0849)  BP: (!) 119/57 (03/24/23 0750)  SpO2: 97 % (03/24/23 0849)   Vital Signs (24h Range):  Temp:  [96.2 °F (35.7 °C)-98.5 °F (36.9 °C)] 98.3 °F (36.8 °C)  Pulse:  [51-62] 59  Resp:  [12-20] 17  SpO2:  [91 %-98 %] 97 %  BP: ()/(49-57) 119/57     Weight: 98.2 kg (216 lb 7.9 oz)  Body mass index is 33.91 kg/m².    Intake/Output Summary (Last 24 hours) at 3/24/2023 1239  Last data filed at 3/24/2023 1209  Gross per 24 hour   Intake --   Output 1600 ml   Net -1600 ml      Physical Exam  Vitals and nursing note reviewed.   Constitutional:       General: She is not in acute distress.      Appearance: She is ill-appearing.   Eyes:      General: No scleral icterus.  Cardiovascular:      Rate and Rhythm: Regular rhythm. Bradycardia present.      Pulses: Normal pulses.      Heart sounds: Normal heart sounds. No murmur heard.  Pulmonary:      Effort: Pulmonary effort is normal. No respiratory distress.      Breath sounds: Rales present. No wheezing.   Abdominal:      Palpations: Abdomen is soft.      Tenderness: There is no abdominal tenderness.   Musculoskeletal:      Right lower leg: Edema present.      Left lower leg: Edema present.      Comments: Bilateral lower extremity erythema and tenderness on palpation, no palpable abscess or open wound   Skin:     General: Skin is warm and dry.      Findings: No bruising.   Neurological:      Mental Status: She is alert and oriented to person, place, and time.       Significant Labs: All pertinent labs within the past 24 hours have been reviewed.    Significant Imaging: I have reviewed all pertinent imaging results/findings within the past 24 hours.

## 2023-03-24 NOTE — TELEPHONE ENCOUNTER
----- Message from Jane Sanchez sent at 3/24/2023  4:03 PM CDT -----  Regarding: pt advice/call back  Contact: Mercedes [phone]  CallerMercedes from [facility] Requesting to speak with the nurse regarding Pt. Caller states Pt is currently in hospital. Please call to discuss further.

## 2023-03-24 NOTE — ASSESSMENT & PLAN NOTE
Last urine culture 03/20/2023 was negative.  UA this hospital stay did not show significant pyuria.  Patient has had several previous urine cultures with staph but also has a suprapubic catheter which may be colonized    Per patient she is supposed to be on oral dicloxacillin at home.  This drug is not available on formulary in this hospital and therefore was switched to Keflex.  Given concern for possible cellulitis of lower extremity, will do ceftriaxone for now but do not think UTI needs to be treated at this time.  Blood cultures 3/23 show no growth till date

## 2023-03-24 NOTE — PLAN OF CARE
1000  Patient resting quietly in bed when CM rounded with Dr Sullivan, pharmacist Franco, & nurse Elieser. No family at the bedside.    1145  Patient off the unit having a CT (chest) done when CM rounded to completed the discharge planning assessment.     [facility] - Med Surg  Initial Discharge Assessment       Primary Care Provider: Mesfin Hodges Ii, MD    Admission Diagnosis: Shortness of breath [R06.02]  Swelling [R60.9]  Heart failure, unspecified HF chronicity, unspecified heart failure type [I50.9]    Admission Date: 3/22/2023  Expected Discharge Date:          Payor: HUMANA MANAGED MEDICARE / Plan: HUMANA SNP HMO PPO SPECIAL NEEDS / Product Type: Medicare Advantage /     Extended Emergency Contact Information  Primary Emergency Contact: Dangelo Hawley  Address: [address] [place] [address]           [place] [address] [facility]  Home Phone: [phone]  Mobile Phone: [phone]  Relation: Spouse  Secondary Emergency Contact: Lisa Thompson   [place]  Mobile Phone: [phone]  Relation: Daughter    Discharge Plan A: (P) Home Health  Discharge Plan B: (P) SNF      [facility]  [address]  [place] [address]  Phone: [phone] Fax: [phone]    [facility] [place] - [facility] - [address]  [address]  [place] [address]  Phone: [phone] Fax: [phone]    [facility] - [facility] [address] [facility]  [address] [facility] [address]  Phone: [phone] Fax: [phone]      Initial Assessment (most recent)       Adult Discharge Assessment - 03/24/23 1600          Discharge Assessment    Assessment Type Discharge Planning Assessment (P)      Confirmed/corrected address, phone number and insurance Yes (P)      Confirmed Demographics Correct on Facesheet (P)      Source of Information patient (P)      Communicated JACKIE with patient/caregiver Date not available/Unable to  determine (P)      People in Home spouse (P)    spouse, Dangelo Hawley ([phone])    Do you expect to return to your current living situation? Yes (P)      Do you have help at home or someone to help you manage your care at home? Yes (P)      Prior to hospitilization cognitive status: Alert/Oriented (P)      Current cognitive status: Alert/Oriented (P)      Walking or Climbing Stairs ambulation difficulty, requires equipment (P)      Dressing/Bathing bathing difficulty, requires equipment (P)      Equipment Currently Used at Home bedside commode;wheelchair;walker, rolling;rollator;bath bench (P)      Readmission within 30 days? No (P)      Patient currently being followed by outpatient case management? No (P)      Do you currently have service(s) that help you manage your care at home? No (P)      Do you take prescription medications? Yes (P)      Do you have prescription coverage? Yes (P)      Do you have any problems affording any of your prescribed medications? No (P)      Is the patient taking medications as prescribed? yes (P)      How do you get to doctors appointments? family or friend will provide (P)      Are you on dialysis? No (P)      Do you take coumadin? No (P)      Discharge Plan A Home Health (P)      DME Needed Upon Discharge  other (see comments) (P)    tbd    Discharge Plan discussed with: Patient (P)         Physical Activity    On average, how many days per week do you engage in moderate to strenuous exercise (like a brisk walk)? 0 days (P)      On average, how many minutes do you engage in exercise at this level? 0 min (P)         Financial Resource Strain    How hard is it for you to pay for the very basics like food, housing, medical care, and heating? Somewhat hard (P)         Housing Stability    In the last 12 months, was there a time when you were not able to pay the mortgage or rent on time? No (P)      In the last 12 months, was there a time when you did not have a steady place  to sleep or slept in a shelter (including now)? No (P)         Transportation Needs    In the past 12 months, has lack of transportation kept you from medical appointments or from getting medications? Yes (P)      In the past 12 months, has lack of transportation kept you from meetings, work, or from getting things needed for daily living? Yes (P)         Food Insecurity    Within the past 12 months, you worried that your food would run out before you got the money to buy more. Never true (P)      Within the past 12 months, the food you bought just didn't last and you didn't have money to get more. Never true (P)         Stress    Do you feel stress - tense, restless, nervous, or anxious, or unable to sleep at night because your mind is troubled all the time - these days? Rather much (P)         Social Connections    In a typical week, how many times do you talk on the phone with family, friends, or neighbors? More than three times a week (P)      How often do you get together with friends or relatives? More than three times a week (P)      How often do you attend Holiness or Sabianism services? Never (P)      Do you belong to any clubs or organizations such as Holiness groups, unions, fraternal or athletic groups, or school groups? No (P)      How often do you attend meetings of the clubs or organizations you belong to? Never (P)      Are you , , , , never , or living with a partner?  (P)         Alcohol Use    Q1: How often do you have a drink containing alcohol? Never (P)      Q2: How many drinks containing alcohol do you have on a typical day when you are drinking? Patient does not drink (P)      Q3: How often do you have six or more drinks on one occasion? Never (P)                    1600  Patient resting quietly in bed when CM rounded. No family present. Patient was admitted with acute respiratory failure & is being followed by PT/OT. Pox 99% on 2L O2 via NC this AM.      Patient lives with her spouse, Dangelo Hawley ([phone]), has equipment to assist with ADLs, is currently receiving  services from [facility] & denied the need for assistance with transportation at time of discharge.     CM updated patient's whiteboard with CM name & contact information.       Will continue to follow.

## 2023-03-24 NOTE — PT/OT/SLP PROGRESS
Physical Therapy Evaluation Attempt      Patient Name:  Tasha Hawley   MRN:  [id number]    Patient not seen today secondary to Other (Comment), MD hold (Comment) (hold PT pending x-ray results for possible spine fx). Will follow-up as able.    3/24/2023

## 2023-03-24 NOTE — PLAN OF CARE
Pt alert. C/o pain. PRN pain meds given per MAR. Tolerating cardiac diet. IV ABX. Bed locked in lowest position, bed alarm set and call bell within reach.

## 2023-03-24 NOTE — PROGRESS NOTES
[facility] Medicine  Progress Note    Patient Name: Tasha Hawley  MRN: [id number]  Patient Class: OP- Observation   Admission Date: 3/22/2023  Length of Stay: 0 days  Attending Physician: Bharti Sullivan MD  Primary Care Provider: Mesfin Hodges Ii, MD        Subjective:     Principal Problem:Acute hypoxemic respiratory failure        HPI:  67 year old female with a history of CHF, cirrhosis, meadows dependence presents with shortness of breath, myalgias, and lower extremity edema. The swelling has worsened and now there is redness. She says she does not wear oxygen at home. No chest pain. Reports recently seen at [facility] for UTI and was started on dicloxacillin. She says she had diarrhea as well but this was before the abx initiation.  UA appeared grossly infected 3 days ago however no growth to date on urine culture.  Patient with lower extremity edema.  Given 80 mg Lasix in ED. chest x-ray with interstitial edema.  Patient newly on O2 requirements.  Troponin negative.  BNP negative.  Patient will be admitted to Hospital Medicine      Overview/Hospital Course:  No notes on file    Interval History:  Documented urine output of 1600 cc.  This morning when she stood up to work with physical therapy, felt lightheaded and short of breath for which she was placed on 2 L oxygen via NC.  Unclear if she became hypoxic.  No orthostatics vitals noted.  Patient reports feeling fatigued and unwell.  Complains of subjective dyspnea and chest discomfort.    Patient reports being mostly bed-bound due to chronic back pain and fatigue.  Bilateral lower extremity ultrasound were negative for DVT.  Not tachycardic but given dizziness and dyspnea with decreased mobility, will get CTA chest to rule out PE.  Continue gentle diuresis, incentive spirometer.  On review of MAR, patient is currently on hydrocortisone 10 mg daily only for recent diagnosis of adrenal insufficiency for which she has not followed up with  endocrinology as outpatient.  On review of last discharge summary 01/2023, patient was supposed to be on total 15 mg hydrocortisone with 5 mg in the evening. Evening dose added.     Review of Systems   Constitutional:  Positive for fatigue.   Respiratory:  Positive for shortness of breath.    Cardiovascular:  Positive for leg swelling.   Neurological:  Positive for light-headedness.   Objective:     Vital Signs (Most Recent):  Temp: 98.3 °F (36.8 °C) (03/24/23 0434)  Pulse: (!) 59 (03/24/23 0849)  Resp: 17 (03/24/23 0849)  BP: (!) 119/57 (03/24/23 0750)  SpO2: 97 % (03/24/23 0849)   Vital Signs (24h Range):  Temp:  [96.2 °F (35.7 °C)-98.5 °F (36.9 °C)] 98.3 °F (36.8 °C)  Pulse:  [51-62] 59  Resp:  [12-20] 17  SpO2:  [91 %-98 %] 97 %  BP: ()/(49-57) 119/57     Weight: 98.2 kg (216 lb 7.9 oz)  Body mass index is 33.91 kg/m².    Intake/Output Summary (Last 24 hours) at 3/24/2023 1239  Last data filed at 3/24/2023 1209  Gross per 24 hour   Intake --   Output 1600 ml   Net -1600 ml      Physical Exam  Vitals and nursing note reviewed.   Constitutional:       General: She is not in acute distress.     Appearance: She is ill-appearing.   Eyes:      General: No scleral icterus.  Cardiovascular:      Rate and Rhythm: Regular rhythm. Bradycardia present.      Pulses: Normal pulses.      Heart sounds: Normal heart sounds. No murmur heard.  Pulmonary:      Effort: Pulmonary effort is normal. No respiratory distress.      Breath sounds: Rales present. No wheezing.   Abdominal:      Palpations: Abdomen is soft.      Tenderness: There is no abdominal tenderness.   Musculoskeletal:      Right lower leg: Edema present.      Left lower leg: Edema present.      Comments: Bilateral lower extremity erythema and tenderness on palpation, no palpable abscess or open wound   Skin:     General: Skin is warm and dry.      Findings: No bruising.   Neurological:      Mental Status: She is alert and oriented to person, place, and time.        Significant Labs: All pertinent labs within the past 24 hours have been reviewed.    Significant Imaging: I have reviewed all pertinent imaging results/findings within the past 24 hours.      Assessment/Plan:      * Acute hypoxemic respiratory failure  Patient with Hypoxic Respiratory failure which is Acute.  she is not on home oxygen. Supplemental oxygen was provided and noted- Oxygen Concentration (%):  [28] 28.   Signs/symptoms of respiratory failure include- shortness of breath.     On 2 L oxygen via NC saturating 96%.  Suspect dyspnea and hypoxia multifactorial in setting of atelectasis, ?Mild volume overload (BNP maybe falsely low in obesity)    CTA chest ruled out PE, no large focal consolidation noted.  Procalcitonin negative less suggestive of pneumonia.  CT chest showed emphysema.  Not on home inhalers.  Suspect obstructive airway disease.  DuoNebs on back order.  Add Spiriva for now.  P.r.n. albuterol. Add ABG    Incentive spirometer.  Increase mobility.  Gentle IV diuresis for now, this can likely be switched to p.o. later today or tomorrow.  Suspect subjective dyspnea may be in setting of deconditioning or a systemic problem.  Patient reported lightheadedness while working with physical therapy which could be in setting of orthostasis as she was on suboptimal steroid dosing for adrenal insufficiency.  On chart review, discharged 01/2023 on total hydrocortisone 15 mg q.d..  Currently on 10 mg q.d..  Evening dose of hydrocortisone 5 mg added.  Fludrocortisone added.  Also noted on chart review, last TSH in 01/2023 was 51.  Repeat TSH pending.      Adrenal insufficiency  Continue hydrocortisone  Will need outpatient Endocrinology follow-up      UTI (urinary tract infection)  Last urine culture 03/20/2023 was negative.  UA this hospital stay did not show significant pyuria.  Patient has had several previous urine cultures with staph but also has a suprapubic catheter which may be colonized    Per  patient she is supposed to be on oral dicloxacillin at home.  This drug is not available on formulary in this hospital and therefore was switched to Keflex.  Given concern for possible cellulitis of lower extremity, will do ceftriaxone for now but do not think UTI needs to be treated at this time.  Blood cultures 3/23 show no growth till date    Debility  Deconditioned with almost bed-bound status  PT/OT      S/P insertion of intrathecal pump  Follows with pain clinic outpatient   Per patient change of pump due on 03/30/2023      Lymphedema of both lower extremities  Ultrasound negative for DVT  Given 80 mg Lasix in ED  Continue gentle diuresis    Redness, tenderness and swelling suggestive of cellulitis the patient does not have systemic signs of infection.  She is chronically on steroids and therefore may not mount a same immune response.  Procalcitonin low is reassuring.  For now, we will continue ceftriaxone for cellulitis    Primary hypothyroidism  Last TSH 51.8 in 01/2023    Continue levothyroxine at 137 mcg q.d. for now     TSH this hospital stay pending           Neurogenic bladder  S/p suprapubic catheter      SOB (shortness of breath)  As above      Chronic pain syndrome  With intrathecal Dilaudid pump in place   Continue home dose p.r.n. Norco, Tylenol    Per patient back pain is chronic due to multiple past surgeries but patient reported worsening pain while working with PT and history of falls    Will get thoracolumbar spine x-ray to rule out fracture      VTE Risk Mitigation (From admission, onward)         Ordered     enoxaparin injection 40 mg  Daily         03/23/23 0156     IP VTE HIGH RISK PATIENT  Once         03/23/23 0156                Discharge Planning   JACKIE:      Code Status: Full Code   Is the patient medically ready for discharge?:     Reason for patient still in hospital (select all that apply): Patient trending condition and Laboratory test                     Bharti Sullivan,  MD  Department of [facility] Medicine   [facility] Surg

## 2023-03-25 LAB
ANION GAP SERPL CALC-SCNC: 9 MMOL/L (ref 8–16)
BACTERIA BLD CULT: NORMAL
BACTERIA BLD CULT: NORMAL
BUN SERPL-MCNC: 6 MG/DL (ref 8–23)
CALCIUM SERPL-MCNC: 8.6 MG/DL (ref 8.7–10.5)
CHLORIDE SERPL-SCNC: 97 MMOL/L (ref 95–110)
CO2 SERPL-SCNC: 35 MMOL/L (ref 23–29)
CREAT SERPL-MCNC: 0.8 MG/DL (ref 0.5–1.4)
EST. GFR  (NO RACE VARIABLE): >60 ML/MIN/1.73 M^2
GLUCOSE SERPL-MCNC: 83 MG/DL (ref 70–110)
POTASSIUM SERPL-SCNC: 3.6 MMOL/L (ref 3.5–5.1)
SODIUM SERPL-SCNC: 141 MMOL/L (ref 136–145)

## 2023-03-25 PROCEDURE — 27000221 HC OXYGEN, UP TO 24 HOURS: Mod: HCNC

## 2023-03-25 PROCEDURE — 94660 CPAP INITIATION&MGMT: CPT | Mod: HCNC

## 2023-03-25 PROCEDURE — 94799 UNLISTED PULMONARY SVC/PX: CPT | Mod: HCNC

## 2023-03-25 PROCEDURE — 93010 EKG 12-LEAD: ICD-10-PCS | Mod: HCNC,,, | Performed by: INTERNAL MEDICINE

## 2023-03-25 PROCEDURE — 27000190 HC CPAP FULL FACE MASK W/VALVE: Mod: HCNC

## 2023-03-25 PROCEDURE — 25000003 PHARM REV CODE 250: Mod: HCNC | Performed by: REGISTERED NURSE

## 2023-03-25 PROCEDURE — 94640 AIRWAY INHALATION TREATMENT: CPT | Mod: HCNC

## 2023-03-25 PROCEDURE — 25000003 PHARM REV CODE 250: Mod: HCNC | Performed by: STUDENT IN AN ORGANIZED HEALTH CARE EDUCATION/TRAINING PROGRAM

## 2023-03-25 PROCEDURE — 93010 ELECTROCARDIOGRAM REPORT: CPT | Mod: HCNC,,, | Performed by: INTERNAL MEDICINE

## 2023-03-25 PROCEDURE — 99900035 HC TECH TIME PER 15 MIN (STAT): Mod: HCNC

## 2023-03-25 PROCEDURE — 25000003 PHARM REV CODE 250: Mod: HCNC | Performed by: NURSE PRACTITIONER

## 2023-03-25 PROCEDURE — 80048 BASIC METABOLIC PNL TOTAL CA: CPT | Mod: HCNC | Performed by: STUDENT IN AN ORGANIZED HEALTH CARE EDUCATION/TRAINING PROGRAM

## 2023-03-25 PROCEDURE — 36415 COLL VENOUS BLD VENIPUNCTURE: CPT | Mod: HCNC | Performed by: STUDENT IN AN ORGANIZED HEALTH CARE EDUCATION/TRAINING PROGRAM

## 2023-03-25 PROCEDURE — 94761 N-INVAS EAR/PLS OXIMETRY MLT: CPT | Mod: HCNC

## 2023-03-25 PROCEDURE — 63600175 PHARM REV CODE 636 W HCPCS: Mod: HCNC | Performed by: STUDENT IN AN ORGANIZED HEALTH CARE EDUCATION/TRAINING PROGRAM

## 2023-03-25 PROCEDURE — 11000001 HC ACUTE MED/SURG PRIVATE ROOM: Mod: HCNC

## 2023-03-25 PROCEDURE — 63600175 PHARM REV CODE 636 W HCPCS: Mod: HCNC | Performed by: REGISTERED NURSE

## 2023-03-25 PROCEDURE — 93005 ELECTROCARDIOGRAM TRACING: CPT | Mod: HCNC

## 2023-03-25 RX ORDER — HYDROMORPHONE HYDROCHLORIDE 1 MG/ML
0.2 INJECTION, SOLUTION INTRAMUSCULAR; INTRAVENOUS; SUBCUTANEOUS ONCE
Status: COMPLETED | OUTPATIENT
Start: 2023-03-25 | End: 2023-03-25

## 2023-03-25 RX ORDER — FUROSEMIDE 10 MG/ML
20 INJECTION INTRAMUSCULAR; INTRAVENOUS
Status: DISCONTINUED | OUTPATIENT
Start: 2023-03-25 | End: 2023-03-26

## 2023-03-25 RX ORDER — PANTOPRAZOLE SODIUM 40 MG/1
40 TABLET, DELAYED RELEASE ORAL 2 TIMES DAILY
Status: DISCONTINUED | OUTPATIENT
Start: 2023-03-25 | End: 2023-03-28

## 2023-03-25 RX ADMIN — HYDROCORTISONE 5 MG: 5 TABLET ORAL at 05:03

## 2023-03-25 RX ADMIN — ALUMINUM HYDROXIDE, MAGNESIUM HYDROXIDE, AND SIMETHICONE 30 ML: 200; 200; 20 SUSPENSION ORAL at 09:03

## 2023-03-25 RX ADMIN — MUPIROCIN: 20 OINTMENT TOPICAL at 09:03

## 2023-03-25 RX ADMIN — CEFTRIAXONE 1 G: 1 INJECTION, POWDER, FOR SOLUTION INTRAMUSCULAR; INTRAVENOUS at 12:03

## 2023-03-25 RX ADMIN — TRAZODONE HYDROCHLORIDE 50 MG: 50 TABLET ORAL at 09:03

## 2023-03-25 RX ADMIN — HYDROCODONE BITARTRATE AND ACETAMINOPHEN 1 TABLET: 5; 325 TABLET ORAL at 03:03

## 2023-03-25 RX ADMIN — POTASSIUM BICARBONATE 20 MEQ: 391 TABLET, EFFERVESCENT ORAL at 08:03

## 2023-03-25 RX ADMIN — HYDROCODONE BITARTRATE AND ACETAMINOPHEN 1 TABLET: 5; 325 TABLET ORAL at 09:03

## 2023-03-25 RX ADMIN — ASPIRIN 81 MG: 81 TABLET, COATED ORAL at 08:03

## 2023-03-25 RX ADMIN — MUPIROCIN: 20 OINTMENT TOPICAL at 08:03

## 2023-03-25 RX ADMIN — BUTALBITAL, ACETAMINOPHEN, AND CAFFEINE 1 TABLET: 50; 325; 40 TABLET ORAL at 09:03

## 2023-03-25 RX ADMIN — POTASSIUM BICARBONATE 20 MEQ: 391 TABLET, EFFERVESCENT ORAL at 09:03

## 2023-03-25 RX ADMIN — FLUDROCORTISONE ACETATE 100 MCG: 0.1 TABLET ORAL at 08:03

## 2023-03-25 RX ADMIN — FLUOXETINE HYDROCHLORIDE 60 MG: 20 CAPSULE ORAL at 08:03

## 2023-03-25 RX ADMIN — HYDROCORTISONE 10 MG: 5 TABLET ORAL at 08:03

## 2023-03-25 RX ADMIN — PANTOPRAZOLE SODIUM 40 MG: 40 TABLET, DELAYED RELEASE ORAL at 09:03

## 2023-03-25 RX ADMIN — HYDROMORPHONE HYDROCHLORIDE 0.2 MG: 1 INJECTION, SOLUTION INTRAMUSCULAR; INTRAVENOUS; SUBCUTANEOUS at 10:03

## 2023-03-25 RX ADMIN — FUROSEMIDE 20 MG: 10 INJECTION, SOLUTION INTRAMUSCULAR; INTRAVENOUS at 09:03

## 2023-03-25 RX ADMIN — PANTOPRAZOLE SODIUM 40 MG: 40 TABLET, DELAYED RELEASE ORAL at 08:03

## 2023-03-25 RX ADMIN — FUROSEMIDE 20 MG: 10 INJECTION, SOLUTION INTRAMUSCULAR; INTRAVENOUS at 10:03

## 2023-03-25 RX ADMIN — LEVOTHYROXINE SODIUM 137 MCG: 137 TABLET ORAL at 05:03

## 2023-03-25 RX ADMIN — ENOXAPARIN SODIUM 40 MG: 40 INJECTION SUBCUTANEOUS at 05:03

## 2023-03-25 RX ADMIN — ATORVASTATIN CALCIUM 80 MG: 40 TABLET, FILM COATED ORAL at 08:03

## 2023-03-25 RX ADMIN — QUETIAPINE FUMARATE 200 MG: 100 TABLET ORAL at 09:03

## 2023-03-25 RX ADMIN — TIOTROPIUM BROMIDE INHALATION SPRAY 2 PUFF: 3.12 SPRAY, METERED RESPIRATORY (INHALATION) at 08:03

## 2023-03-25 NOTE — ASSESSMENT & PLAN NOTE
Patient with Hypoxic Respiratory failure which is Acute.  she is not on home oxygen. Supplemental oxygen was provided and noted- Oxygen Concentration (%):  [28] 28.   Signs/symptoms of respiratory failure include- shortness of breath.     On 2 L oxygen via NC saturating 96%.  Suspect dyspnea and hypoxia multifactorial in setting of atelectasis, ?Mild volume overload (BNP maybe falsely low in obesity)    CTA chest ruled out PE, no large focal consolidation noted.  Procalcitonin negative less suggestive of pneumonia.  CT chest showed emphysema.  Not on home inhalers.  Suspect obstructive airway disease.  DuoNebs on back order.  Add Spiriva for now.  P.r.n. albuterol.  ABG showed pCO2 64, based on pH this appears to be chronic.  Per discussion with Pulmonary fellow, she would meet criteria for outpatient BiPAP use.  BiPAP q.h.s. ordered for while in the hospital.    Incentive spirometer.  Increase mobility. Suspect subjective dyspnea may be in setting of volume overload +/- deconditioning or a systemic problem.  Patient reported lightheadedness while working with physical therapy which could be in setting of orthostasis as she was on suboptimal steroid dosing for adrenal insufficiency.  On chart review, discharged 01/2023 on total hydrocortisone 15 mg q.d..  Currently on 10 mg q.d..  Evening dose of hydrocortisone 5 mg added.  Fludrocortisone added.  Also noted on chart review, last TSH in 01/2023 was 51.  Repeat TSH elevated to 41 but free T4 within normal limits.    Continue gentle diuresis with IV Lasix

## 2023-03-25 NOTE — PROGRESS NOTES
[facility] Medicine  Progress Note    Patient Name: Tasha Hawley  MRN: [id number]  Patient Class: IP- Inpatient   Admission Date: 3/22/2023  Length of Stay: 1 days  Attending Physician: Bharti Sullivan MD  Primary Care Provider: Mesfin Hodges Ii, MD        Subjective:     Principal Problem:Acute hypoxemic respiratory failure    HPI:  67 year old female with a history of CHF, cirrhosis, meadows dependence presents with shortness of breath, myalgias, and lower extremity edema. The swelling has worsened and now there is redness. She says she does not wear oxygen at home. No chest pain. Reports recently seen at [facility] for UTI and was started on dicloxacillin. She says she had diarrhea as well but this was before the abx initiation.  UA appeared grossly infected 3 days ago however no growth to date on urine culture.  Patient with lower extremity edema.  Given 80 mg Lasix in ED. chest x-ray with interstitial edema.  Patient newly on O2 requirements.  Troponin negative.  BNP negative.  Patient will be admitted to Hospital Medicine      Overview/Hospital Course:  No notes on file    Interval History:  Patient reports breathing has somewhat improved but has severe lower extremity pain for which he requests for 1 time dose of breakthrough Dilaudid.  Dilaudid 0.2 mg x 1 dose was ordered.  Good urine output overnight, will continue IV Lasix for today.    Review of Systems   Constitutional:  Positive for fatigue.   Respiratory:  Positive for shortness of breath.    Cardiovascular:  Positive for leg swelling.   Neurological:  Negative for light-headedness.   Objective:     Vital Signs (Most Recent):  Temp: 98 °F (36.7 °C) (03/25/23 1535)  Pulse: (!) 59 (03/25/23 1558)  Resp: 20 (03/25/23 1535)  BP: (!) 104/52 (03/25/23 1535)  SpO2: 98 % (03/25/23 1535)   Vital Signs (24h Range):  Temp:  [97.4 °F (36.3 °C)-98.4 °F (36.9 °C)] 98 °F (36.7 °C)  Pulse:  [47-62] 59  Resp:  [16-20] 20  SpO2:  [92 %-100 %] 98  %  BP: ()/(50-57) 104/52     Weight: 98.2 kg (216 lb 7.9 oz)  Body mass index is 33.91 kg/m².    Intake/Output Summary (Last 24 hours) at 3/25/2023 1741  Last data filed at 3/25/2023 0955  Gross per 24 hour   Intake 98.36 ml   Output 1250 ml   Net -1151.64 ml        Physical Exam  Vitals and nursing note reviewed.   Constitutional:       General: She is not in acute distress.     Appearance: She is ill-appearing.   Eyes:      General: No scleral icterus.  Cardiovascular:      Rate and Rhythm: Regular rhythm. Bradycardia present.      Pulses: Normal pulses.      Heart sounds: Normal heart sounds. No murmur heard.  Pulmonary:      Effort: Pulmonary effort is normal. No respiratory distress.      Breath sounds: Rales present. No wheezing.   Abdominal:      Palpations: Abdomen is soft.      Tenderness: There is no abdominal tenderness.   Musculoskeletal:      Right lower leg: Edema present.      Left lower leg: Edema present.      Comments: Bilateral lower extremity erythema and tenderness on palpation, no palpable abscess or open wound  Lower extremity edema improving   Skin:     General: Skin is warm and dry.      Findings: No bruising.   Neurological:      Mental Status: She is alert and oriented to person, place, and time.       Significant Labs: All pertinent labs within the past 24 hours have been reviewed.    Significant Imaging: I have reviewed all pertinent imaging results/findings within the past 24 hours.      Assessment/Plan:      * Acute hypoxemic respiratory failure  Patient with Hypoxic Respiratory failure which is Acute.  she is not on home oxygen. Supplemental oxygen was provided and noted- Oxygen Concentration (%):  [28] 28.   Signs/symptoms of respiratory failure include- shortness of breath.     On 2 L oxygen via NC saturating 96%.  Suspect dyspnea and hypoxia multifactorial in setting of atelectasis, ?Mild volume overload (BNP maybe falsely low in obesity)    CTA chest ruled out PE, no large  focal consolidation noted.  Procalcitonin negative less suggestive of pneumonia.  CT chest showed emphysema.  Not on home inhalers.  Suspect obstructive airway disease.  DuoNebs on back order.  Add Spiriva for now.  P.r.n. albuterol.  ABG showed pCO2 64, based on pH this appears to be chronic.  Per discussion with Pulmonary fellow, she would meet criteria for outpatient BiPAP use.  BiPAP q.h.s. ordered for while in the hospital.    Incentive spirometer.  Increase mobility. Suspect subjective dyspnea may be in setting of volume overload +/- deconditioning or a systemic problem.  Patient reported lightheadedness while working with physical therapy which could be in setting of orthostasis as she was on suboptimal steroid dosing for adrenal insufficiency.  On chart review, discharged 01/2023 on total hydrocortisone 15 mg q.d..  Currently on 10 mg q.d..  Evening dose of hydrocortisone 5 mg added.  Fludrocortisone added.  Also noted on chart review, last TSH in 01/2023 was 51.  Repeat TSH elevated to 41 but free T4 within normal limits.    Continue gentle diuresis with IV Lasix      Adrenal insufficiency  Continue hydrocortisone  Will need outpatient Endocrinology follow-up      UTI (urinary tract infection)  Last urine culture 03/20/2023 was negative.  UA this hospital stay did not show significant pyuria.  Patient has had several previous urine cultures with staph but also has a suprapubic catheter which may be colonized    Per patient she is supposed to be on oral dicloxacillin at home.  This drug is not available on formulary in this hospital and therefore was switched to Keflex.  Given concern for possible cellulitis of lower extremity, will do ceftriaxone for now but do not think UTI needs to be treated at this time.  Blood cultures 3/23 show no growth till date    Debility  Deconditioned with almost bed-bound status  PT/OT      S/P insertion of intrathecal pump  Follows with pain clinic outpatient   Per patient  change of pump due on 03/30/2023      Lymphedema of both lower extremities  Ultrasound negative for DVT  Given 80 mg Lasix in ED  Continue gentle diuresis    Redness, tenderness and swelling suggestive of cellulitis the patient does not have systemic signs of infection.  She is chronically on steroids and therefore may not mount a same immune response.  Procalcitonin low is reassuring.  For now, we will continue ceftriaxone for cellulitis    Primary hypothyroidism  Last TSH 51.8 in 01/2023, TSH remains elevated at 41 but free T4 normal    Continue levothyroxine at 137 mcg q.d. for now               Neurogenic bladder  S/p suprapubic catheter      SOB (shortness of breath)  As above      Chronic pain syndrome  With intrathecal Dilaudid pump in place   Continue home dose p.r.n. Norco, Tylenol    Per patient back pain is chronic due to multiple past surgeries but patient reported worsening pain while working with PT and history of falls    Thoracolumbar spine x-ray shows no acute fracture or displacement. Okay to work with PT/OT      VTE Risk Mitigation (From admission, onward)         Ordered     enoxaparin injection 40 mg  Daily         03/23/23 0156     IP VTE HIGH RISK PATIENT  Once         03/23/23 0156                Discharge Planning   JACKIE: 3/27/2023     Code Status: Full Code   Is the patient medically ready for discharge?:     Reason for patient still in hospital (select all that apply): Patient trending condition and Treatment  Discharge Plan A: Home Health          Bharti Sullivan MD  Department of Hospital Medicine   [facility]

## 2023-03-25 NOTE — NURSING
Notified SHONDA Carter that pt is refusing PM dose of oral potassium. Pt has had low potassium x2 days but refusing oral potassium replacement. Per NP, will give 10mEq of IV potassium and recheck K levels in the AM.

## 2023-03-25 NOTE — PT/OT/SLP PROGRESS
Physical Therapy  Missed Evaluation    Patient Name:  Tasha Hawley   MRN:  [id number]      Patient not seen today secondary to Other  MD hold  (hold PT pending x-ray results for possible spine fx).  Per RN, pt hasn't had Xray performed yet.  PT will see as able once x-ray results are known.  Avis Hou, PT  3/25/2023

## 2023-03-25 NOTE — SUBJECTIVE & OBJECTIVE
Interval History:  Patient reports breathing has somewhat improved but has severe lower extremity pain for which he requests for 1 time dose of breakthrough Dilaudid.  Dilaudid 0.2 mg x 1 dose was ordered.  Good urine output overnight, will continue IV Lasix for today.    Review of Systems   Constitutional:  Positive for fatigue.   Respiratory:  Positive for shortness of breath.    Cardiovascular:  Positive for leg swelling.   Neurological:  Negative for light-headedness.   Objective:     Vital Signs (Most Recent):  Temp: 98 °F (36.7 °C) (03/25/23 1535)  Pulse: (!) 59 (03/25/23 1558)  Resp: 20 (03/25/23 1535)  BP: (!) 104/52 (03/25/23 1535)  SpO2: 98 % (03/25/23 1535)   Vital Signs (24h Range):  Temp:  [97.4 °F (36.3 °C)-98.4 °F (36.9 °C)] 98 °F (36.7 °C)  Pulse:  [47-62] 59  Resp:  [16-20] 20  SpO2:  [92 %-100 %] 98 %  BP: ()/(50-57) 104/52     Weight: 98.2 kg (216 lb 7.9 oz)  Body mass index is 33.91 kg/m².    Intake/Output Summary (Last 24 hours) at 3/25/2023 1741  Last data filed at 3/25/2023 0955  Gross per 24 hour   Intake 98.36 ml   Output 1250 ml   Net -1151.64 ml        Physical Exam  Vitals and nursing note reviewed.   Constitutional:       General: She is not in acute distress.     Appearance: She is ill-appearing.   Eyes:      General: No scleral icterus.  Cardiovascular:      Rate and Rhythm: Regular rhythm. Bradycardia present.      Pulses: Normal pulses.      Heart sounds: Normal heart sounds. No murmur heard.  Pulmonary:      Effort: Pulmonary effort is normal. No respiratory distress.      Breath sounds: Rales present. No wheezing.   Abdominal:      Palpations: Abdomen is soft.      Tenderness: There is no abdominal tenderness.   Musculoskeletal:      Right lower leg: Edema present.      Left lower leg: Edema present.      Comments: Bilateral lower extremity erythema and tenderness on palpation, no palpable abscess or open wound  Lower extremity edema improving   Skin:     General: Skin is  warm and dry.      Findings: No bruising.   Neurological:      Mental Status: She is alert and oriented to person, place, and time.       Significant Labs: All pertinent labs within the past 24 hours have been reviewed.    Significant Imaging: I have reviewed all pertinent imaging results/findings within the past 24 hours.

## 2023-03-25 NOTE — ASSESSMENT & PLAN NOTE
Last TSH 51.8 in 01/2023, TSH remains elevated at 41 but free T4 normal    Continue levothyroxine at 137 mcg q.d. for now

## 2023-03-25 NOTE — ASSESSMENT & PLAN NOTE
With intrathecal Dilaudid pump in place   Continue home dose p.r.n. Norco, Tylenol    Per patient back pain is chronic due to multiple past surgeries but patient reported worsening pain while working with PT and history of falls    Thoracolumbar spine x-ray shows no acute fracture or displacement. Okay to work with PT/OT

## 2023-03-25 NOTE — PLAN OF CARE
Pt AAOx4. PRN Norco given for pain. No N/V. Medications administered per MAR. On 3L NC. Cardiac monitoring maintained. Suprapubic cath in place, draining clear yellow urine. Bed alarm set, side rails raised, and call light in reach.

## 2023-03-26 LAB
ANION GAP SERPL CALC-SCNC: 6 MMOL/L (ref 8–16)
BUN SERPL-MCNC: 8 MG/DL (ref 8–23)
CALCIUM SERPL-MCNC: 8.4 MG/DL (ref 8.7–10.5)
CHLORIDE SERPL-SCNC: 97 MMOL/L (ref 95–110)
CO2 SERPL-SCNC: 37 MMOL/L (ref 23–29)
CREAT SERPL-MCNC: 0.8 MG/DL (ref 0.5–1.4)
EST. GFR  (NO RACE VARIABLE): >60 ML/MIN/1.73 M^2
GLUCOSE SERPL-MCNC: 102 MG/DL (ref 70–110)
MAGNESIUM SERPL-MCNC: 2.2 MG/DL (ref 1.6–2.6)
POCT GLUCOSE: 130 MG/DL (ref 70–110)
POTASSIUM SERPL-SCNC: 3.4 MMOL/L (ref 3.5–5.1)
SODIUM SERPL-SCNC: 140 MMOL/L (ref 136–145)

## 2023-03-26 PROCEDURE — 99900035 HC TECH TIME PER 15 MIN (STAT): Mod: HCNC

## 2023-03-26 PROCEDURE — 94761 N-INVAS EAR/PLS OXIMETRY MLT: CPT | Mod: HCNC

## 2023-03-26 PROCEDURE — 25000003 PHARM REV CODE 250: Mod: HCNC | Performed by: STUDENT IN AN ORGANIZED HEALTH CARE EDUCATION/TRAINING PROGRAM

## 2023-03-26 PROCEDURE — 63600175 PHARM REV CODE 636 W HCPCS: Mod: HCNC | Performed by: STUDENT IN AN ORGANIZED HEALTH CARE EDUCATION/TRAINING PROGRAM

## 2023-03-26 PROCEDURE — 25000003 PHARM REV CODE 250: Mod: HCNC | Performed by: REGISTERED NURSE

## 2023-03-26 PROCEDURE — 94640 AIRWAY INHALATION TREATMENT: CPT | Mod: HCNC

## 2023-03-26 PROCEDURE — 80048 BASIC METABOLIC PNL TOTAL CA: CPT | Mod: HCNC | Performed by: STUDENT IN AN ORGANIZED HEALTH CARE EDUCATION/TRAINING PROGRAM

## 2023-03-26 PROCEDURE — 63600175 PHARM REV CODE 636 W HCPCS: Mod: HCNC | Performed by: REGISTERED NURSE

## 2023-03-26 PROCEDURE — 83735 ASSAY OF MAGNESIUM: CPT | Mod: HCNC | Performed by: STUDENT IN AN ORGANIZED HEALTH CARE EDUCATION/TRAINING PROGRAM

## 2023-03-26 PROCEDURE — 36415 COLL VENOUS BLD VENIPUNCTURE: CPT | Mod: HCNC | Performed by: STUDENT IN AN ORGANIZED HEALTH CARE EDUCATION/TRAINING PROGRAM

## 2023-03-26 PROCEDURE — 27000221 HC OXYGEN, UP TO 24 HOURS: Mod: HCNC

## 2023-03-26 PROCEDURE — 11000001 HC ACUTE MED/SURG PRIVATE ROOM: Mod: HCNC

## 2023-03-26 PROCEDURE — 25000003 PHARM REV CODE 250: Mod: HCNC | Performed by: NURSE PRACTITIONER

## 2023-03-26 PROCEDURE — 94799 UNLISTED PULMONARY SVC/PX: CPT | Mod: HCNC

## 2023-03-26 RX ORDER — FUROSEMIDE 40 MG/1
40 TABLET ORAL DAILY
Status: DISCONTINUED | OUTPATIENT
Start: 2023-03-26 | End: 2023-03-27

## 2023-03-26 RX ADMIN — PANTOPRAZOLE SODIUM 40 MG: 40 TABLET, DELAYED RELEASE ORAL at 08:03

## 2023-03-26 RX ADMIN — SODIUM CHLORIDE 250 ML: 0.9 INJECTION, SOLUTION INTRAVENOUS at 05:03

## 2023-03-26 RX ADMIN — MUPIROCIN: 20 OINTMENT TOPICAL at 08:03

## 2023-03-26 RX ADMIN — QUETIAPINE FUMARATE 200 MG: 100 TABLET ORAL at 09:03

## 2023-03-26 RX ADMIN — ENOXAPARIN SODIUM 40 MG: 40 INJECTION SUBCUTANEOUS at 05:03

## 2023-03-26 RX ADMIN — TRAZODONE HYDROCHLORIDE 50 MG: 50 TABLET ORAL at 09:03

## 2023-03-26 RX ADMIN — HYDROCORTISONE 10 MG: 5 TABLET ORAL at 08:03

## 2023-03-26 RX ADMIN — TIOTROPIUM BROMIDE INHALATION SPRAY 2 PUFF: 3.12 SPRAY, METERED RESPIRATORY (INHALATION) at 08:03

## 2023-03-26 RX ADMIN — FLUDROCORTISONE ACETATE 100 MCG: 0.1 TABLET ORAL at 08:03

## 2023-03-26 RX ADMIN — MUPIROCIN: 20 OINTMENT TOPICAL at 09:03

## 2023-03-26 RX ADMIN — LEVOTHYROXINE SODIUM 137 MCG: 137 TABLET ORAL at 05:03

## 2023-03-26 RX ADMIN — PANTOPRAZOLE SODIUM 40 MG: 40 TABLET, DELAYED RELEASE ORAL at 09:03

## 2023-03-26 RX ADMIN — CEFTRIAXONE 1 G: 1 INJECTION, POWDER, FOR SOLUTION INTRAMUSCULAR; INTRAVENOUS at 12:03

## 2023-03-26 RX ADMIN — HYDROCODONE BITARTRATE AND ACETAMINOPHEN 1 TABLET: 5; 325 TABLET ORAL at 11:03

## 2023-03-26 RX ADMIN — ASPIRIN 81 MG: 81 TABLET, COATED ORAL at 08:03

## 2023-03-26 RX ADMIN — ATORVASTATIN CALCIUM 80 MG: 40 TABLET, FILM COATED ORAL at 08:03

## 2023-03-26 RX ADMIN — HYDROCODONE BITARTRATE AND ACETAMINOPHEN 1 TABLET: 5; 325 TABLET ORAL at 09:03

## 2023-03-26 RX ADMIN — HYDROCORTISONE 5 MG: 5 TABLET ORAL at 05:03

## 2023-03-26 RX ADMIN — FLUOXETINE HYDROCHLORIDE 60 MG: 20 CAPSULE ORAL at 08:03

## 2023-03-26 RX ADMIN — BUTALBITAL, ACETAMINOPHEN, AND CAFFEINE 1 TABLET: 50; 325; 40 TABLET ORAL at 09:03

## 2023-03-26 NOTE — PT/OT/SLP PROGRESS
Physical Therapy      Patient Name:  Tasha Hawley   MRN:  [id number]    Patient not seen today secondary to Other (Comment) (PT attempted to complete PT evaluation on patient at 9:18 am and 9:48 am.). Patient requesting PT to return in a few minutes after 9:18 attempt. At 9:48 am attempt, patient reporting pain at a 9-10/10 in the right leg and low back as well as patient reporting extremely tired with patient having difficulty keeping eyes open. PT informed RN and will attempt to see patient tomorrow as able/appropriate.

## 2023-03-26 NOTE — SUBJECTIVE & OBJECTIVE
Interval History:  No acute events overnight.  Patient reports severe fatigue today and is talking with her eyes closed under the blanket.  She opens her eyes and answers questions appropriately but would not engage actively in conversation.  Requesting for pain medication for her feet.  Discussed her swelling and redness has improved.  Would defer IV pain medications at this time, encouraged to request for oral pain medications.  No family at bedside.    Review of Systems   Constitutional:  Positive for fatigue.   Respiratory:  Positive for shortness of breath.    Cardiovascular:  Positive for leg swelling.   Neurological:  Negative for light-headedness.   Objective:     Vital Signs (Most Recent):  Temp: 97.8 °F (36.6 °C) (03/26/23 1607)  Pulse: (!) 51 (03/26/23 1607)  Resp: 16 (03/26/23 1607)  BP: (!) 85/46 (03/26/23 1607)  SpO2: 96 % (03/26/23 1607)   Vital Signs (24h Range):  Temp:  [97.8 °F (36.6 °C)-98.2 °F (36.8 °C)] 97.8 °F (36.6 °C)  Pulse:  [48-58] 51  Resp:  [16-20] 16  SpO2:  [93 %-97 %] 96 %  BP: ()/(46-59) 85/46     Weight: 100.1 kg (220 lb 10.9 oz)  Body mass index is 34.56 kg/m².    Intake/Output Summary (Last 24 hours) at 3/26/2023 1707  Last data filed at 3/26/2023 1657  Gross per 24 hour   Intake --   Output 2425 ml   Net -2425 ml        Physical Exam  Vitals and nursing note reviewed.   Constitutional:       General: She is not in acute distress.     Appearance: She is ill-appearing.   Eyes:      General: No scleral icterus.  Cardiovascular:      Rate and Rhythm: Regular rhythm. Bradycardia present.      Pulses: Normal pulses.      Heart sounds: Normal heart sounds. No murmur heard.  Pulmonary:      Effort: Pulmonary effort is normal. No respiratory distress.      Breath sounds: No wheezing.   Abdominal:      Palpations: Abdomen is soft.      Tenderness: There is no abdominal tenderness.   Musculoskeletal:      Right lower leg: Edema present.      Left lower leg: Edema present.       Comments: Bilateral lower extremity erythema and tenderness on palpation, no palpable abscess or open wound  Lower extremity edema, redness improving   Skin:     General: Skin is warm and dry.      Findings: No bruising.   Neurological:      Mental Status: She is alert and oriented to person, place, and time.       Significant Labs: All pertinent labs within the past 24 hours have been reviewed.    Significant Imaging: I have reviewed all pertinent imaging results/findings within the past 24 hours.

## 2023-03-26 NOTE — PROGRESS NOTES
[facility]  Progress Note    Patient Name: Tasha Hawley  MRN: [id number]  Patient Class: IP- Inpatient   Admission Date: 3/22/2023  Length of Stay: 2 days  Attending Physician: Bharti Sullivan MD  Primary Care Provider: Mesfin Hodges Ii, MD        Subjective:     Principal Problem:Acute hypoxemic respiratory failure      HPI:  67 year old female with a history of CHF, cirrhosis, meadows dependence presents with shortness of breath, myalgias, and lower extremity edema. The swelling has worsened and now there is redness. She says she does not wear oxygen at home. No chest pain. Reports recently seen at [facility] for UTI and was started on dicloxacillin. She says she had diarrhea as well but this was before the abx initiation.  UA appeared grossly infected 3 days ago however no growth to date on urine culture.  Patient with lower extremity edema.  Given 80 mg Lasix in ED. chest x-ray with interstitial edema.  Patient newly on O2 requirements.  Troponin negative.  BNP negative.  Patient will be admitted to Hospital Medicine      Overview/Hospital Course:  No notes on file    Interval History:  No acute events overnight.  Patient reports severe fatigue today and is talking with her eyes closed under the blanket.  She opens her eyes and answers questions appropriately but would not engage actively in conversation.  Requesting for pain medication for her feet.  Discussed her swelling and redness has improved.  Would defer IV pain medications at this time, encouraged to request for oral pain medications.  Refusing po potassium. Educated on importance of taking potassium.   No family at bedside.    Review of Systems   Constitutional:  Positive for fatigue.   Respiratory:  Positive for shortness of breath.    Cardiovascular:  Positive for leg swelling.   Neurological:  Negative for light-headedness.   Objective:     Vital Signs (Most Recent):  Temp: 97.8 °F (36.6 °C) (03/26/23 1607)  Pulse: (!)  51 (03/26/23 1607)  Resp: 16 (03/26/23 1607)  BP: (!) 85/46 (03/26/23 1607)  SpO2: 96 % (03/26/23 1607)   Vital Signs (24h Range):  Temp:  [97.8 °F (36.6 °C)-98.2 °F (36.8 °C)] 97.8 °F (36.6 °C)  Pulse:  [48-58] 51  Resp:  [16-20] 16  SpO2:  [93 %-97 %] 96 %  BP: ()/(46-59) 85/46     Weight: 100.1 kg (220 lb 10.9 oz)  Body mass index is 34.56 kg/m².    Intake/Output Summary (Last 24 hours) at 3/26/2023 1707  Last data filed at 3/26/2023 1657  Gross per 24 hour   Intake --   Output 2425 ml   Net -2425 ml        Physical Exam  Vitals and nursing note reviewed.   Constitutional:       General: She is not in acute distress.     Appearance: She is ill-appearing.   Eyes:      General: No scleral icterus.  Cardiovascular:      Rate and Rhythm: Regular rhythm. Bradycardia present.      Pulses: Normal pulses.      Heart sounds: Normal heart sounds. No murmur heard.  Pulmonary:      Effort: Pulmonary effort is normal. No respiratory distress.      Breath sounds: No wheezing.   Abdominal:      Palpations: Abdomen is soft.      Tenderness: There is no abdominal tenderness.   Musculoskeletal:      Right lower leg: Edema present.      Left lower leg: Edema present.      Comments: Bilateral lower extremity erythema and tenderness on palpation, no palpable abscess or open wound  Lower extremity edema, redness improving   Skin:     General: Skin is warm and dry.      Findings: No bruising.   Neurological:      Mental Status: She is alert and oriented to person, place, and time.       Significant Labs: All pertinent labs within the past 24 hours have been reviewed.    Significant Imaging: I have reviewed all pertinent imaging results/findings within the past 24 hours.      Assessment/Plan:      * Acute hypoxemic respiratory failure  Patient with Hypoxic Respiratory failure which is Acute.  she is not on home oxygen. Supplemental oxygen was provided and noted- Oxygen Concentration (%):  [28] 28.   Signs/symptoms of respiratory  failure include- shortness of breath.     On 2 L oxygen via NC saturating 96%.  Suspect dyspnea and hypoxia multifactorial in setting of atelectasis, ?Mild volume overload (BNP maybe falsely low in obesity)    CTA chest ruled out PE, no large focal consolidation noted.  Procalcitonin negative less suggestive of pneumonia.  CT chest showed emphysema.  Not on home inhalers.  Suspect obstructive airway disease.  DuoNebs on back order.  Add Spiriva for now.  P.r.n. albuterol.  ABG showed pCO2 64, based on pH this appears to be chronic.  Per discussion with Pulmonary fellow, she would meet criteria for outpatient BiPAP use.  BiPAP q.h.s. ordered for while in the hospital.    Incentive spirometer.  Increase mobility. Suspect subjective dyspnea may be in setting of volume overload +/- deconditioning or a systemic problem.  Patient reported lightheadedness while working with physical therapy which could be in setting of orthostasis as she was on suboptimal steroid dosing for adrenal insufficiency.  On chart review, discharged 01/2023 on total hydrocortisone 15 mg q.d..  Currently on 10 mg q.d..  Evening dose of hydrocortisone 5 mg added.  Fludrocortisone added.  Also noted on chart review, last TSH in 01/2023 was 51.  Repeat TSH elevated to 41 but free T4 within normal limits.    Hold IV diuresis.  Transitioned to oral Lasix.  Patient continues to complain of subjective dyspnea though is on 1 L via NC saturating 96% and looks comfortable on exam.  Encouraged working with physical therapy, incentive spirometer use to help with atelectasis but has been declining both.      Adrenal insufficiency    Given extreme fatigue, hypotension-will consult endocrinology for adrenal insufficiency as patient has not had a formal evaluation by endocrinology as for now, will continue hydrocortisone as recommended on last discharge summary      UTI (urinary tract infection)  Last urine culture 03/20/2023 was negative.  UA this hospital stay  did not show significant pyuria.  Patient has had several previous urine cultures with staph but also has a suprapubic catheter which may be colonized    Per patient she is supposed to be on oral dicloxacillin at home.  This drug is not available on formulary in this hospital and therefore was switched to Keflex.  Given concern for possible cellulitis of lower extremity, will do ceftriaxone for now but do not think UTI needs to be treated at this time.  Blood cultures 3/23 show no growth till date    Debility  Deconditioned with almost bed-bound status  PT/OT      S/P insertion of intrathecal pump  Follows with pain clinic outpatient   Per patient change of pump due on 03/30/2023      Lymphedema of both lower extremities  Ultrasound negative for DVT  Given 80 mg Lasix in ED  Continue gentle diuresis    Redness, tenderness and swelling suggestive of cellulitis the patient does not have systemic signs of infection.  She is chronically on steroids and therefore may not mount a same immune response.  Procalcitonin low is reassuring.  For now, we will continue ceftriaxone for cellulitis    Primary hypothyroidism  Last TSH 51.8 in 01/2023, TSH remains elevated at 41 but free T4 normal    Continue levothyroxine at 137 mcg q.d. for now               Neurogenic bladder  S/p suprapubic catheter      SOB (shortness of breath)  As above      Chronic pain syndrome  With intrathecal Dilaudid pump in place   Continue home dose p.r.n. Norco, Tylenol    Per patient back pain is chronic due to multiple past surgeries but patient reported worsening pain while working with PT and history of falls    Thoracolumbar spine x-ray shows no acute fracture or displacement. Okay to work with PT/OT        VTE Risk Mitigation (From admission, onward)           Ordered     enoxaparin injection 40 mg  Daily         03/23/23 0156     IP VTE HIGH RISK PATIENT  Once         03/23/23 0156                    Discharge Planning   JACKIE: 3/27/2023     Code  Status: Full Code   Is the patient medically ready for discharge?:     Reason for patient still in hospital (select all that apply): Patient trending condition and Consult recommendations  Discharge Plan A: Home Health            Bharti Sullivan MD  Department of Hospital Medicine   [facility]

## 2023-03-26 NOTE — ASSESSMENT & PLAN NOTE
Patient with Hypoxic Respiratory failure which is Acute.  she is not on home oxygen. Supplemental oxygen was provided and noted- Oxygen Concentration (%):  [28] 28.   Signs/symptoms of respiratory failure include- shortness of breath.     On 2 L oxygen via NC saturating 96%.  Suspect dyspnea and hypoxia multifactorial in setting of atelectasis, ?Mild volume overload (BNP maybe falsely low in obesity)    CTA chest ruled out PE, no large focal consolidation noted.  Procalcitonin negative less suggestive of pneumonia.  CT chest showed emphysema.  Not on home inhalers.  Suspect obstructive airway disease.  DuoNebs on back order.  Add Spiriva for now.  P.r.n. albuterol.  ABG showed pCO2 64, based on pH this appears to be chronic.  Per discussion with Pulmonary fellow, she would meet criteria for outpatient BiPAP use.  BiPAP q.h.s. ordered for while in the hospital.    Incentive spirometer.  Increase mobility. Suspect subjective dyspnea may be in setting of volume overload +/- deconditioning or a systemic problem.  Patient reported lightheadedness while working with physical therapy which could be in setting of orthostasis as she was on suboptimal steroid dosing for adrenal insufficiency.  On chart review, discharged 01/2023 on total hydrocortisone 15 mg q.d..  Currently on 10 mg q.d..  Evening dose of hydrocortisone 5 mg added.  Fludrocortisone added.  Also noted on chart review, last TSH in 01/2023 was 51.  Repeat TSH elevated to 41 but free T4 within normal limits.    Hold IV diuresis.  Transitioned to oral Lasix.  Patient continues to complain of subjective dyspnea though is on 1 L via NC saturating 96% and looks comfortable on exam.  Encouraged working with physical therapy, incentive spirometer use to help with atelectasis but has been declining both.

## 2023-03-26 NOTE — PLAN OF CARE
Patient on oxygen in no apparent distress, given MDI treatment, no adverse reactions will continue to monitor.

## 2023-03-26 NOTE — ASSESSMENT & PLAN NOTE
Given extreme fatigue, hypotension-will consult endocrinology for adrenal insufficiency as patient has not had a formal evaluation by endocrinology as for now, will continue hydrocortisone as recommended on last discharge summary

## 2023-03-26 NOTE — PLAN OF CARE
Patient AAOx3 without complaint of pain managed with prn medications. All alarms set and monitored. Medications administered as ordered. Safety maintained. Will continue to monitor.

## 2023-03-27 ENCOUNTER — TELEPHONE (OUTPATIENT)
Dept: UROLOGY | Facility: CLINIC | Age: 68
End: 2023-03-27
Payer: MEDICARE

## 2023-03-27 ENCOUNTER — PATIENT OUTREACH (OUTPATIENT)
Dept: ADMINISTRATIVE | Facility: OTHER | Age: 68
End: 2023-03-27
Payer: MEDICARE

## 2023-03-27 PROBLEM — J96.02 ACUTE RESPIRATORY FAILURE WITH HYPOXIA AND HYPERCARBIA: Status: ACTIVE | Noted: 2023-03-23

## 2023-03-27 LAB
ALLENS TEST: ABNORMAL
DELSYS: ABNORMAL
HCO3 UR-SCNC: 35.7 MMOL/L (ref 24–28)
PCO2 BLDA: 59.9 MMHG (ref 35–45)
PH SMN: 7.38 [PH] (ref 7.35–7.45)
PO2 BLDA: 79 MMHG (ref 80–100)
POC BE: 11 MMOL/L
POC SATURATED O2: 95 % (ref 95–100)
POC TCO2: 37 MMOL/L (ref 23–27)
POCT GLUCOSE: 115 MG/DL (ref 70–110)
SAMPLE: ABNORMAL
SITE: ABNORMAL

## 2023-03-27 PROCEDURE — 94799 UNLISTED PULMONARY SVC/PX: CPT | Mod: HCNC

## 2023-03-27 PROCEDURE — 63600175 PHARM REV CODE 636 W HCPCS: Mod: HCNC | Performed by: STUDENT IN AN ORGANIZED HEALTH CARE EDUCATION/TRAINING PROGRAM

## 2023-03-27 PROCEDURE — 27000221 HC OXYGEN, UP TO 24 HOURS: Mod: HCNC

## 2023-03-27 PROCEDURE — 63600175 PHARM REV CODE 636 W HCPCS: Mod: HCNC | Performed by: REGISTERED NURSE

## 2023-03-27 PROCEDURE — 25000003 PHARM REV CODE 250: Mod: HCNC | Performed by: REGISTERED NURSE

## 2023-03-27 PROCEDURE — 99900035 HC TECH TIME PER 15 MIN (STAT): Mod: HCNC

## 2023-03-27 PROCEDURE — 97530 THERAPEUTIC ACTIVITIES: CPT | Mod: HCNC

## 2023-03-27 PROCEDURE — 11000001 HC ACUTE MED/SURG PRIVATE ROOM: Mod: HCNC

## 2023-03-27 PROCEDURE — 97530 THERAPEUTIC ACTIVITIES: CPT | Mod: HCNC,CO

## 2023-03-27 PROCEDURE — 25000003 PHARM REV CODE 250: Mod: HCNC | Performed by: NURSE PRACTITIONER

## 2023-03-27 PROCEDURE — 97110 THERAPEUTIC EXERCISES: CPT | Mod: HCNC,CO

## 2023-03-27 PROCEDURE — 94640 AIRWAY INHALATION TREATMENT: CPT | Mod: HCNC

## 2023-03-27 PROCEDURE — 25000003 PHARM REV CODE 250: Mod: HCNC | Performed by: STUDENT IN AN ORGANIZED HEALTH CARE EDUCATION/TRAINING PROGRAM

## 2023-03-27 PROCEDURE — 94761 N-INVAS EAR/PLS OXIMETRY MLT: CPT | Mod: HCNC

## 2023-03-27 PROCEDURE — 97161 PT EVAL LOW COMPLEX 20 MIN: CPT | Mod: HCNC

## 2023-03-27 PROCEDURE — 82803 BLOOD GASES ANY COMBINATION: CPT | Mod: HCNC

## 2023-03-27 PROCEDURE — 36600 WITHDRAWAL OF ARTERIAL BLOOD: CPT | Mod: HCNC

## 2023-03-27 RX ORDER — FUROSEMIDE 10 MG/ML
20 INJECTION INTRAMUSCULAR; INTRAVENOUS DAILY
Status: DISCONTINUED | OUTPATIENT
Start: 2023-03-27 | End: 2023-03-28

## 2023-03-27 RX ORDER — CEFAZOLIN SODIUM 1 G/50ML
1 SOLUTION INTRAVENOUS
Status: DISCONTINUED | OUTPATIENT
Start: 2023-03-27 | End: 2023-03-29

## 2023-03-27 RX ORDER — SENNOSIDES 8.6 MG/1
8.6 TABLET ORAL DAILY PRN
Status: DISCONTINUED | OUTPATIENT
Start: 2023-03-27 | End: 2023-04-03 | Stop reason: HOSPADM

## 2023-03-27 RX ORDER — HYDROMORPHONE HYDROCHLORIDE 1 MG/ML
0.5 INJECTION, SOLUTION INTRAMUSCULAR; INTRAVENOUS; SUBCUTANEOUS ONCE
Status: COMPLETED | OUTPATIENT
Start: 2023-03-27 | End: 2023-03-27

## 2023-03-27 RX ORDER — POLYETHYLENE GLYCOL 3350 17 G/17G
17 POWDER, FOR SOLUTION ORAL 2 TIMES DAILY PRN
Status: DISCONTINUED | OUTPATIENT
Start: 2023-03-27 | End: 2023-04-03 | Stop reason: HOSPADM

## 2023-03-27 RX ORDER — LIOTHYRONINE SODIUM 25 UG/1
25 TABLET ORAL DAILY
Status: DISCONTINUED | OUTPATIENT
Start: 2023-03-28 | End: 2023-04-03 | Stop reason: HOSPADM

## 2023-03-27 RX ADMIN — POTASSIUM BICARBONATE 20 MEQ: 391 TABLET, EFFERVESCENT ORAL at 08:03

## 2023-03-27 RX ADMIN — CEFAZOLIN SODIUM 1 G: 1 SOLUTION INTRAVENOUS at 08:03

## 2023-03-27 RX ADMIN — MUPIROCIN: 20 OINTMENT TOPICAL at 08:03

## 2023-03-27 RX ADMIN — ENOXAPARIN SODIUM 40 MG: 40 INJECTION SUBCUTANEOUS at 05:03

## 2023-03-27 RX ADMIN — TIOTROPIUM BROMIDE INHALATION SPRAY 2 PUFF: 3.12 SPRAY, METERED RESPIRATORY (INHALATION) at 09:03

## 2023-03-27 RX ADMIN — PANTOPRAZOLE SODIUM 40 MG: 40 TABLET, DELAYED RELEASE ORAL at 09:03

## 2023-03-27 RX ADMIN — FUROSEMIDE 20 MG: 10 INJECTION, SOLUTION INTRAMUSCULAR; INTRAVENOUS at 05:03

## 2023-03-27 RX ADMIN — FLUOXETINE HYDROCHLORIDE 60 MG: 20 CAPSULE ORAL at 09:03

## 2023-03-27 RX ADMIN — CEFAZOLIN SODIUM 1 G: 1 SOLUTION INTRAVENOUS at 11:03

## 2023-03-27 RX ADMIN — STANDARDIZED SENNA CONCENTRATE 8.6 MG: 8.6 TABLET ORAL at 06:03

## 2023-03-27 RX ADMIN — FLUDROCORTISONE ACETATE 100 MCG: 0.1 TABLET ORAL at 09:03

## 2023-03-27 RX ADMIN — QUETIAPINE FUMARATE 200 MG: 100 TABLET ORAL at 08:03

## 2023-03-27 RX ADMIN — HYDROCORTISONE 5 MG: 5 TABLET ORAL at 05:03

## 2023-03-27 RX ADMIN — FUROSEMIDE 40 MG: 40 TABLET ORAL at 12:03

## 2023-03-27 RX ADMIN — TRAZODONE HYDROCHLORIDE 50 MG: 50 TABLET ORAL at 08:03

## 2023-03-27 RX ADMIN — PANTOPRAZOLE SODIUM 40 MG: 40 TABLET, DELAYED RELEASE ORAL at 08:03

## 2023-03-27 RX ADMIN — POTASSIUM BICARBONATE 20 MEQ: 391 TABLET, EFFERVESCENT ORAL at 09:03

## 2023-03-27 RX ADMIN — MUPIROCIN: 20 OINTMENT TOPICAL at 09:03

## 2023-03-27 RX ADMIN — LEVOTHYROXINE SODIUM 137 MCG: 137 TABLET ORAL at 05:03

## 2023-03-27 RX ADMIN — HYDROCODONE BITARTRATE AND ACETAMINOPHEN 1 TABLET: 5; 325 TABLET ORAL at 06:03

## 2023-03-27 RX ADMIN — ATORVASTATIN CALCIUM 80 MG: 40 TABLET, FILM COATED ORAL at 09:03

## 2023-03-27 RX ADMIN — HYDROMORPHONE HYDROCHLORIDE 0.5 MG: 1 INJECTION, SOLUTION INTRAMUSCULAR; INTRAVENOUS; SUBCUTANEOUS at 11:03

## 2023-03-27 RX ADMIN — ASPIRIN 81 MG: 81 TABLET, COATED ORAL at 09:03

## 2023-03-27 RX ADMIN — HYDROCORTISONE 10 MG: 5 TABLET ORAL at 09:03

## 2023-03-27 RX ADMIN — HYDROCODONE BITARTRATE AND ACETAMINOPHEN 1 TABLET: 5; 325 TABLET ORAL at 09:03

## 2023-03-27 NOTE — CONSULTS
[facility] Surg  Pulmonology  Consult Note    Patient Name: Tasha Hawley  MRN: [id number]  Admission Date: 3/22/2023  Hospital Length of Stay: 3 days  Code Status: Full Code  Attending Physician: Bharti Sullivan MD  Primary Care Provider: Mesfin Hodges Ii, MD   Principal Problem: Acute respiratory failure with hypoxia and hypercarbia    Inpatient consult to Pulmonology  Consult performed by: Franco Samuels MD  Consult ordered by: Bharti Sullivan MD        Subjective:     HPI:  Mrs. Tasha Hawley is a 68 yo woman with adrenal insufficiency, hypothyroidism, chronic lower back pain, CECI (not on CPAP), Chronic Hypercapnic Respiratory Failure, Chronic Diastolic Heart Failure, Morbid Obesity who initially presented due to worsening L Leg pain & dysuria. During her hospitalization, has felt better overall, however over the past year she has noted worsening dyspnea. This occurs at rest, with activity. It comes and goes, worse when she lays flat, significant orthopnea per patient, however it has only become more frequent. She states that in the past, when she had her CPAP it would make sleeping at night easier, however it was taken away (she reports being unsure why). She does endorse missing medications on occasion. Not as active as she used to be, gets around with a rolling walker however does not get out or do much. She does drink 6-8 cans of 7UP a day, doesn't eat much large meals due to her GERD and aspiration (takes prilosec). She is a never smoker, formerly a  selling seafood, no work exposure. No history of ILD or family history of ILD. Not sickly as a child, no PND nor asthma. We discussed with her the multiple comorbidities and she is aware. Endocrinology following to assist with her AI and hypothyroidism.      Past Medical History:   Diagnosis Date    Anticoagulant long-term use     Anxiety     Arthritis     Bilateral lower extremity edema     severe chronic    Carotid artery occlusion     Cataract      CHF (congestive heart failure)     Coronary artery disease     subtotalled LAD with collateral    Depression     Fever blister     Hard of hearing     Hypokalemia 1/9/2023    Hyponatremia 2/4/2022    Hypothyroid     Iron deficiency anemia     Lumbar radiculopathy     with chronic pain    Ocular migraine     Renal disorder     Sleep apnea     cpap       Past Surgical History:   Procedure Laterality Date    ADENOIDECTOMY      BACK SURGERY      x 12    CARDIAC CATHETERIZATION  2016    subtotalled LAD with right to left collaterals    CATARACT EXTRACTION W/  INTRAOCULAR LENS IMPLANT Left     Dr Coleman     CYSTOSCOPIC LITHOLAPAXY N/A 6/27/2019    Procedure: CYSTOLITHOLAPAXY;  Surgeon: Shireen Mayo MD;  Location: [facility] OR 2ND FLR;  Service: Urology;  Laterality: N/A;    CYSTOSCOPIC LITHOLAPAXY N/A 9/3/2019    Procedure: CYSTOLITHOLAPAXY;  Surgeon: Shireen Mayo MD;  Location: [facility] OR 2ND FLR;  Service: Urology;  Laterality: N/A;    CYSTOSCOPY N/A 7/13/2021    Procedure: CYSTOSCOPY;  Surgeon: Shireen Mayo MD;  Location: [facility] OR 1ST FLR;  Service: Urology;  Laterality: N/A;    CYSTOSCOPY  11/16/2021    Procedure: CYSTOSCOPY;  Surgeon: Shireen Mayo MD;  Location: [facility] OR 1ST FLR;  Service: Urology;;    CYSTOSCOPY  7/19/2022    Procedure: CYSTOSCOPY;  Surgeon: Shireen Mayo MD;  Location: [facility] OR 1ST FLR;  Service: Urology;;    CYSTOSCOPY WITH INJECTION OF PERIURETHRAL BULKING AGENT  7/19/2022    Procedure: CYSTOSCOPY, WITH PERIURETHRAL BULKING AGENT INJECTION-MACROPLASTIQUE;  Surgeon: Shireen Mayo MD;  Location: [facility] OR 1ST FLR;  Service: Urology;;    CYSTOSCOPY,WITH BOTULINUM TOXIN INJECTION N/A 12/13/2022    Procedure: CYSTOSCOPY,WITH BOTULINUM TOXIN INJECTION;  Surgeon: Shireen Mayo MD;  Location: [facility] OR 1ST FLR;  Service: Urology;  Laterality: N/A;  300 U    ESOPHAGOGASTRODUODENOSCOPY N/A 5/23/2018    Procedure: ESOPHAGOGASTRODUODENOSCOPY (EGD);  Surgeon: Prince Vance MD;   Location: [facility] ENDO (4TH FLR);  Service: Endoscopy;  Laterality: N/A;  r/s 'd per Dr. Vance due to family emergency- ER    HYSTERECTOMY  1975    endometriosis    INJECTION OF BOTULINUM TOXIN TYPE A  7/13/2021    Procedure: INJECTION, BOTULINUM TOXIN, 200units;  Surgeon: Shireen Mayo MD;  [facility];  Service: Urology;;    INJECTION OF BOTULINUM TOXIN TYPE A  11/16/2021    Procedure: INJECTION, BOTULINUM TOXIN, 200units;  Surgeon: Shireen Mayo MD;  [facility];  Service: Urology;;    INJECTION OF BOTULINUM TOXIN TYPE A  7/19/2022    Procedure: INJECTION, BOTULINUM TOXIN, 300 units ;  Surgeon: Shireen Mayo MD;  [facility];  Service: Urology;;    INSERTION, SUPRAPUBIC CATHETER N/A 12/13/2022    Procedure: INSERTION, SUPRAPUBIC CATHETER;  Surgeon: Shireen Mayo MD;  Location: [facility] OR [address] [place] [address];  Service: Urology;  Laterality: N/A;  exchange    pain pump placement      SQ Dilaudid Pump managed by Dr. Hillman, Pain Management    REMOVAL OF BONE SPUR OF FOOT Bilateral 9/16/2022    Procedure: EXCISION ARTHRITIC BONE, BILATERAL FOOT;  Surgeon: NICOLA Mcgrath;  Location: [facility];  Service: Podiatry;  Laterality: Bilateral;    REPLACEMENT OF CATHETER N/A 10/31/2019    Procedure: REPLACEMENT, CATHETER-SUPRAPUBIC;  Surgeon: Shireen Mayo MD;  Location: [facility] OR [address] [place] [address];  Service: Urology;  Laterality: N/A;    SPINAL CORD STIMULATOR REMOVAL      before [facility]    SPINE SURGERY  5-13-13    CERVICAL FUSION    TONSILLECTOMY         Review of patient's allergies indicates:   Allergen Reactions    (d)-limonene flavor      Other reaction(s): difficult intubation  Other reaction(s): Difficulty breathing    Bactrim [sulfamethoxazole-trimethoprim] Anaphylaxis    Benadryl [diphenhydramine hcl] Shortness Of Breath    Fentanyl Itching, Nausea And Vomiting and Swelling             Imitrex [sumatriptan succinate] Shortness Of Breath    Percocet  [oxycodone-acetaminophen] Shortness Of Breath    Topamax [topiramate] Shortness Of Breath    Vancomycin Anaphylaxis     Rash    Butorphanol tartrate     Darvocet a500 [propoxyphene n-acetaminophen]      Other reaction(s): Difficulty breathing    Evening primrose (oenothera biennis)     White petrolatum-zinc     Zinc oxide-white petrolatum      Other reaction(s): Difficulty breathing    Latex, natural rubber Itching and Rash    Phenytoin Rash and Other (See Comments)     Trouble breathing       Family History       Problem Relation (Age of Onset)    Cancer Mother (55), Father    Cirrhosis Paternal Aunt, Paternal Uncle    Colon cancer Maternal Uncle    Esophageal cancer Father    Heart disease Maternal Uncle    Liver disease Paternal Aunt, Paternal Uncle    No Known Problems Brother, Brother, Sister, Maternal Aunt, Maternal Grandfather, Paternal Grandmother, Paternal Grandfather    Parkinsonism Maternal Grandmother    Tremor Maternal Grandmother          Tobacco Use    Smoking status: Never    Smokeless tobacco: Never   Substance and Sexual Activity    Alcohol use: Never    Drug use: No    Sexual activity: Never     Partners: Male         Review of Systems   Constitutional:  Positive for fatigue. Negative for activity change and appetite change.   HENT:  Negative for congestion, rhinorrhea, sinus pressure and sinus pain.    Eyes:  Negative for discharge and itching.   Respiratory:  Positive for cough and shortness of breath.    Cardiovascular:  Positive for leg swelling. Negative for chest pain.   Gastrointestinal:  Negative for diarrhea, nausea and vomiting.   Endocrine: Negative for cold intolerance and heat intolerance.   Genitourinary:  Positive for frequency. Negative for dysuria.   Musculoskeletal:  Positive for arthralgias and back pain.   Skin:  Negative for color change and pallor.   Allergic/Immunologic: Negative for environmental allergies, food allergies and immunocompromised state.   Neurological:   Negative for dizziness and syncope.   Hematological:  Negative for adenopathy. Does not bruise/bleed easily.   Psychiatric/Behavioral:  Negative for agitation and self-injury.    Objective:     Vital Signs (Most Recent):  Temp: 98.6 °F (37 °C) (03/27/23 1202)  Pulse: (!) 54 (03/27/23 1202)  Resp: 16 (03/27/23 1202)  BP: 134/60 (03/27/23 1202)  SpO2: 98 % (03/27/23 1209)   Vital Signs (24h Range):  Temp:  [97.2 °F (36.2 °C)-98.6 °F (37 °C)] 98.6 °F (37 °C)  Pulse:  [48-75] 54  Resp:  [16-19] 16  SpO2:  [94 %-98 %] 98 %  BP: ()/(51-63) 134/60     Weight: 100.4 kg (221 lb 5.5 oz)  Body mass index is 34.67 kg/m².      Intake/Output Summary (Last 24 hours) at 3/27/2023 1615  Last data filed at 3/27/2023 1406  Gross per 24 hour   Intake 360 ml   Output 2575 ml   Net -2215 ml       Physical Exam  Constitutional:       Appearance: Normal appearance. She is obese.   HENT:      Head: Normocephalic and atraumatic.      Nose: Nose normal. No congestion.      Mouth/Throat:      Mouth: Mucous membranes are dry.      Pharynx: Oropharynx is clear.   Eyes:      Extraocular Movements: Extraocular movements intact.      Conjunctiva/sclera: Conjunctivae normal.      Pupils: Pupils are equal, round, and reactive to light.   Cardiovascular:      Rate and Rhythm: Bradycardia present.      Pulses: Normal pulses.      Heart sounds: Normal heart sounds. No murmur heard.  Pulmonary:      Effort: Pulmonary effort is normal. No respiratory distress.      Breath sounds: No wheezing.      Comments: Faint bibasilar crackles  Abdominal:      General: Abdomen is flat. Bowel sounds are normal. There is no distension.      Palpations: Abdomen is soft.      Tenderness: There is no abdominal tenderness.   Musculoskeletal:         General: Normal range of motion.      Cervical back: Normal range of motion and neck supple.      Right lower leg: Edema present.      Left lower leg: Edema present.   Skin:     General: Skin is warm and dry.       Capillary Refill: Capillary refill takes less than 2 seconds.   Neurological:      General: No focal deficit present.      Mental Status: She is alert and oriented to person, place, and time. Mental status is at baseline.   Psychiatric:         Mood and Affect: Mood normal.         Thought Content: Thought content normal.       Vents:  Oxygen Concentration (%): 28 (03/26/23 0351)    Lines/Drains/Airways       Drain  Duration                  Suprapubic Catheter 12/13/22 100% silicone 20 Fr. 104 days              Line  Duration                  Subcutaneous Infusion Pump Abdomen (Comment) -- days              Peripheral Intravenous Line  Duration                  Peripheral IV - Single Lumen 03/24/23 2236 22 G Posterior;Right Hand 2 days                    Significant Labs:    CBC/Anemia Profile:  No results for input(s): WBC, HGB, HCT, PLT, MCV, RDW, IRON, FERRITIN, RETIC, FOLATE, [id number], OCCULTBLOOD in the last 48 hours.     Chemistries:  Recent Labs   Lab 03/26/23  0828      K 3.4*   CL 97   CO2 37*   BUN 8   CREATININE 0.8   CALCIUM 8.4*   MG 2.2       All pertinent labs within the past 24 hours have been reviewed.    Significant Imaging:   I have reviewed all pertinent imaging results/findings within the past 24 hours.    Assessment/Plan:     Pulmonary  * Acute respiratory failure with hypoxia and hypercarbia  - Pt with >1 year of worsening dyspnea & SOB with rest and activity. Significant orthopnea.  - Clinically appears comfortable on exam, however the episodes are worse with laying down, activity.   - Never smoker, however no PFT in our system to evaluate lung function, volumes, or diffusing capacity  - A-a gradient normal based on ABG  - Normal EF on last ECHO, some mild TR/MR/LAE. Diastolic dysfunction  - Suspect her symptoms are multifactorial in nature: morbid obesity, edema, hypothyroidism, AI, underlying alveolar hypoventilation  - bedside ultrasound with some B lines, diaphragm moves normally  on both sides    Recommendations  - Agree with continued diuresis as tolerated; would anticipate discharge with a daily oral regimen with additional PRN  - Need to address her hypothyroidism as this likely is playing a role in her overall symptoms (bradycardia, hypotension, weight gain)  - Continue NIV at night while here and at discharge; Ordering NIV for nocturnal and daytime use to decrease the risk of exacerbation. Home BiPAP will be insufficient due to the severity of his illness.  - Outpatient sleep study & PFTs  - Would perform a walk test for home oxygen prior to discharge    Endocrine  Primary hypothyroidism  - TSH 40.194, T3 undetectable, normal range T4  - suspect this is playing a role in her overall clinical picture (bradycardia, hypotension, weight gain, & sleep disordered breathing)  - Endocrinology following; appreciate their assistance    GI  GERD (gastroesophageal reflux disease)  - Start PPI daily    Other  Debility  - PT/OT as able  - Encouraged OOB and into a chair as much as possible    Sleep apnea  - Continue NIV at night  - Needs outpatient sleep study      Thank you for your consult. I will follow-up with patient. Please contact us if you have any additional questions.     Franco Samuels MD  Pulmonology  [facility] Surg    Pt seen and examined with Pulmonary/Critical Care team and this note reviewed and validated with the following additional comments:    Hypoxemia likely multifactorial:  Chronic alveolar hypoventilation.  This could be due to severe hypothyroidism, CECI, or occult obstructive airways disease.  Chronic aspiration. Pt has severe GERD, chokes after meals, has an esophagus full of food and liquid on CT, has crackles in bases, basilar B-lines on POCUS, and basilar infiltrates on CXR.  She does not have intrinsic kidney disease. But she is volume overloaded and has orthopnea and left atrial enlargement, so HFpEF is the most likely explanation even with a normal BNP.     Harpal  MD Loly  Phone [phone]

## 2023-03-27 NOTE — PHYSICIAN QUERY
PT Name: Tasha Hawley  MR #: [id number]     DOCUMENTATION CLARIFICATION     CDS: Grant Moy RN CCDS               Contact information: [email or web address]    This form is a permanent document in the medical record.     Query Date: March 27, 2023    By submitting this query, we are merely seeking further clarification of documentation.  Please utilize your independent clinical judgment when addressing the question(s) below.  The Medical Record contains the following   Indicators   Supporting Clinical Findings Location in Medical Record     x Documentation of Respiratory Failure, ARDS Acute hypoxemic respiratory failure   3/24/2023 Hospital Medicine progress notes     x SOB, JONES, Wheezing, Productive Cough, Use of Accessory Muscles, etc. shortness of breath.     Wheezes and rhonchi 3/24/2023 Hospital Medicine progress notes  3/23/2023 H&P     x RR     ABGs     O2 sat O2 sat 94-99% on 2L O2, RR 18-26  On 2 L oxygen via NC saturating 96%     ABG showed pCO2 64, based on pH this appears to be chronic.       03/24/23 13:40 03/27/23 12:23   Sample ARTERIAL ARTERIAL   POC PH 7.377 7.383   POC PCO2 64.8 (HH) 59.9 (HH)   POC PO2 73 (L) 79 (L)   POC HCO3 38.1 (H) 35.7 (H)   POC TCO2 40 (H) 37 (H)   POC SATURATED O2 93 (L) 95   POC Sodium 140    POC Potassium 3.8    Allens Test Pass Pass   POC HEMOGLOBIN 11    POC Hematocrit 32 (L)    POC BE 13 11   DelSys Nasal Can Nasal Can   Site RR LR    3/22/2023  Vitals  3/24/2023 Hospital Medicine progress notes  3/25/2023 Hospital Medicine progress notes  ABGs     x Hypoxia/Hypercapnia Hypoxic 3/24/2023 Hospital Medicine progress notes     x BiPAP/Intubation/Mechanical Ventilation Per discussion with Pulmonary fellow, she would meet criteria for outpatient BiPAP use.  BiPAP q.h.s. ordered for while in the hospital. 3/25/2023 Hospital Medicine progress notes     x Supplemental O2  She was placed on 2 L oxygen via NC by EMS though initial oxygen saturation on room air is  unclear.   3/23/2023 H&P    Home O2, Oxygen Dependence      Respiratory distress        x Radiology findings CT chest showed emphysema.  3/24/2023 Hospital Medicine progress notes     x Acute/Chronic Illness chronic diastolic heart failure  Adrenal insufficiency  Lymphedema of both lower extremities  Emphysema 3/23/2023 H&P      3/24/2023 Hospital Medicine progress notes     x Treatment Incentive spirometer.  Increase mobility.  Gentle IV diuresis for now, this can likely be switched to p.o. later today or tomorrow.  3/24/2023 Hospital Medicine progress notes    x Other EMS pulse ox not working/ unknown room air PTA.   3/22/2023 ED provider notes       The noted clinical guidelines following a diagnosis are only system guidelines and do not replace the providers clinical judgment.    Due to the conflicting clinical picture, please clinically validate the diagnosis of Acute hypoxemic respiratory failure.    If validated, please provide additional clinical support for the diagnosis.     [    ] Acute hypoxemic respiratory failure is not confirmed and/or it has been ruled out, other diagnosis ruled in:       [ X  ] Hypoxia and Chronic Hypercapnic Respiratory Failure     [   ] Hypoxia only     [   ] Other, (please specify):_______________   [    ] Acute hypoxemic respiratory failure is not confirmed and/or it has been ruled out   [    ] Acute hypoxemic respiratory failure(ABG pO2 < 60 mmHg or O2 sat of <91% on room air and respiratory symptoms documented) diagnosis is confirmed and additional clinical support/decision-making indicators for the diagnosis include (please specify): _______________________________________________   [    ] Other clarification (please specify): ___________________       Please document in your progress notes daily for the duration of treatment until resolved and include in your discharge summary.     Form No. [id number]

## 2023-03-27 NOTE — PLAN OF CARE
Problem: Physical Therapy  Goal: Physical Therapy Goal  Description: Goals to be met by:  23    Patient will increase functional independence with mobility by performin. Supine to sit with Modified Spink  2. Sit to supine with Modified Spink  3. Sit to stand transfer with Supervision  4. Bed to chair transfer with Supervision using Rolling Walker  5. Gait  x 100 feet with Supervision using Rolling Walker.     Outcome: Ongoing, Progressing     PT Eval completed, note to follow. Pt performed multiple sit<>stands from EOB with use of RW and CGA. Pt ambulated ~16 ft with RW and CGA. Pt demonstrates forward flexed trunk posture. Recommending  PT/OT and  supervision/assist.

## 2023-03-27 NOTE — NURSING
03/27/23 0446   Nurse Notification   Bedside Nurse Notified? Yes   Name of Bedside Nurse REJI Lee   Nurse Notfication Method Secure Chat   Nurse Notified Of Other  (last BM 3/22)   Provider Notification   Provider Notified? Yes   Name of Provider SHONDA Carter   Provider Notification Method Secure Chat   Provider Notified Of Other (See Comment)  (last BM 3/22)

## 2023-03-27 NOTE — PT/OT/SLP EVAL
Physical Therapy Evaluation and Treatment    Patient Name:  Tasha Hawley   MRN:  [id number]    Recommendations:     Discharge Recommendations: home health PT, home health OT (24/7 supervision/assist)   Discharge Equipment Recommendations: none   Barriers to discharge: Decreased caregiver support and fall risk    Assessment:     Tasha Hawley is a 67 y.o. female admitted with a medical diagnosis of Acute hypoxemic respiratory failure.  She presents with the following impairments/functional limitations: weakness, gait instability, impaired balance, impaired endurance, impaired self care skills, impaired functional mobility, decreased lower extremity function, decreased upper extremity function, decreased safety awareness, pain, edema, impaired skin, decreased ROM, impaired sensation .Pt performed multiple sit<>stands from EOB with use of RW and CGA. Pt ambulated ~16 ft with RW and CGA. Pt demonstrates forward flexed trunk posture. Recommending  PT/OT and 24/7 supervision/assist.     Rehab Prognosis: Good; patient would benefit from acute skilled PT services to address these deficits and reach maximum level of function.    Recent Surgery: * No surgery found *      Plan:     During this hospitalization, patient to be seen 3 x/week to address the identified rehab impairments via gait training, therapeutic activities, therapeutic exercises, neuromuscular re-education and progress toward the following goals:    Plan of Care Expires:  04/27/23    Subjective     Chief Complaint: BLE pain  Patient/Family Comments/goals: pt is pleasant and agreeable to therapy  Pain/Comfort:  Pain Rating 1: 9/10  Location - Side 1: Bilateral  Location 1: leg (feet and back)  Pain Addressed 1: Reposition, Distraction, Nurse notified  Pain Rating Post-Intervention 1:  (not rated)    Patients cultural, spiritual, Sikhism conflicts given the current situation: no    Living Environment:  Pt lives with spouse in a [facility], ramp access, and  "T/S with TTB  Prior to admission, patients level of function was Mod I to requiring some assist for LB dressing, bathing, and shower transfers; pt reports using rollator for mobility; pt empties her own catheter; spouse drives; pt reports "managing" at home.  Equipment used at home: bedside commode, wheelchair, walker, rolling, rollator, bath bench.  .  Upon discharge, patient will have assistance from spouse but limited physical assist.    Objective:     Communicated with nsg prior to session.  Patient found HOB elevated with telemetry, peripheral IV, oxygen (suprapubic catheter)  upon PT entry to room.    General Precautions: Standard, fall  Orthopedic Precautions:N/A   Braces: N/A  Respiratory Status: Nasal cannula, flow 2 L/min    Exams:  Cognitive Exam:  Patient is oriented to Person, Place, and Situation  Postural Exam:  Patient presented with the following abnormalities:    -       Rounded shoulders  -       Forward head  -       Abnormal trunk flexion  -       Kyphosis  Sensation:    -       Impaired  light/touch BLE and feet; pt reports absent sensation to B feet and distal 3rd of LEs  Skin Integrity/Edema:      -       Skin integrity: Dry  -       Edema: Moderate BLE  B LE ROM: hip flexion limited by decreased strength; hip flexion grossly 2+/5, knee flex 4/5, and ankle DF 3+/5    Functional Mobility:  Bed Mobility:     Rolling Right: stand by assistance  Scooting: stand by assistance  Supine to Sit: stand by assistance  Sit to Supine: stand by assistance  Transfers:     Sit to Stand:  contact guard assistance with rolling walker  Gait: Pt ambulated ~16 ft with RW and CGA. Pt demonstrates forward flexed trunk posture and decreased safety awareness attempting to push RW too far forward and reach outside her ESTIVEN to reach for objects in room; VC's for remaining within ESTIVEN of RW and attempting to achieve a more upright posture      AM-PAC 6 CLICK MOBILITY  Total Score:17       Treatment & Education:  Pt " educated on role of PT/POC and pt agreeable to participate  Pt sat EOB and pt reminded to perform log roll technique   Pt ambulated as reported above; decreased safety awareness noted  Seated rest break EOB taken.  Performed 4 sit <>stands and 10 standing marches with CGA and use of RW  VC's for hand placement  Pt returned supine and BLE elevated on pillow    Patient left HOB elevated with all lines intact, call button in reach, bed alarm on, and nsg notified.    GOALS:   Multidisciplinary Problems       Physical Therapy Goals          Problem: Physical Therapy    Goal Priority Disciplines Outcome Goal Variances Interventions   Physical Therapy Goal     PT, PT/OT Ongoing, Progressing     Description: Goals to be met by:  23    Patient will increase functional independence with mobility by performin. Supine to sit with Modified [place]  2. Sit to supine with Modified [place]  3. Sit to stand transfer with Supervision  4. Bed to chair transfer with Supervision using Rolling Walker  5. Gait  x 100 feet with Supervision using Rolling Walker.                          History:     Past Medical History:   Diagnosis Date    Anticoagulant long-term use     Anxiety     Arthritis     Bilateral lower extremity edema     severe chronic    Carotid artery occlusion     Cataract     CHF (congestive heart failure)     Coronary artery disease     subtotalled LAD with collateral    Depression     Fever blister     Hard of hearing     Hypokalemia 2023    Hyponatremia 2022    Hypothyroid     Iron deficiency anemia     Lumbar radiculopathy     with chronic pain    Ocular migraine     Renal disorder     Sleep apnea     cpap       Past Surgical History:   Procedure Laterality Date    ADENOIDECTOMY      BACK SURGERY      x 12    CARDIAC CATHETERIZATION      subtotalled LAD with right to left collaterals    CATARACT EXTRACTION W/  INTRAOCULAR LENS IMPLANT Left     Dr Coleman     CYSTOSCOPIC LITHOLAPAXY N/A  6/27/2019    Procedure: CYSTOLITHOLAPAXY;  Surgeon: Shireen Mayo MD;  Location: [facility] OR 2ND FLR;  Service: Urology;  Laterality: N/A;    CYSTOSCOPIC LITHOLAPAXY N/A 9/3/2019    Procedure: CYSTOLITHOLAPAXY;  Surgeon: Shireen Mayo MD;  Location: [facility] OR 2ND FLR;  Service: Urology;  Laterality: N/A;    CYSTOSCOPY N/A 7/13/2021    Procedure: CYSTOSCOPY;  Surgeon: Shireen Mayo MD;  Location: [facility] OR 1ST FLR;  Service: Urology;  Laterality: N/A;    CYSTOSCOPY  11/16/2021    Procedure: CYSTOSCOPY;  Surgeon: Shireen Mayo MD;  Location: [facility] OR 1ST FLR;  Service: Urology;;    CYSTOSCOPY  7/19/2022    Procedure: CYSTOSCOPY;  Surgeon: Shireen Mayo MD;  Location: [facility] OR 1ST FLR;  Service: Urology;;    CYSTOSCOPY WITH INJECTION OF PERIURETHRAL BULKING AGENT  7/19/2022    Procedure: CYSTOSCOPY, WITH PERIURETHRAL BULKING AGENT INJECTION-MACROPLASTIQUE;  Surgeon: Shireen Mayo MD;  Location: [facility] OR 1ST FLR;  Service: Urology;;    CYSTOSCOPY,WITH BOTULINUM TOXIN INJECTION N/A 12/13/2022    Procedure: CYSTOSCOPY,WITH BOTULINUM TOXIN INJECTION;  Surgeon: Shireen Mayo MD;  Location: [facility] OR 1ST FLR;  Service: Urology;  Laterality: N/A;  300 U    ESOPHAGOGASTRODUODENOSCOPY N/A 5/23/2018    Procedure: ESOPHAGOGASTRODUODENOSCOPY (EGD);  Surgeon: Prince Vance MD;  Location: [facility] ENDO (4TH FLR);  Service: Endoscopy;  Laterality: N/A;  r/s 'd per Dr. Vance due to family emergency- ER    HYSTERECTOMY  1975    endometriosis    INJECTION OF BOTULINUM TOXIN TYPE A  7/13/2021    Procedure: INJECTION, BOTULINUM TOXIN, 200units;  Surgeon: Shireen Mayo MD;  Location: [facility] OR 1ST FLR;  Service: Urology;;    INJECTION OF BOTULINUM TOXIN TYPE A  11/16/2021    Procedure: INJECTION, BOTULINUM TOXIN, 200units;  Surgeon: Shireen Mayo MD;  Location: [facility] OR [address] [place] [address];  Service: Urology;;    INJECTION OF BOTULINUM TOXIN TYPE A  7/19/2022    Procedure: INJECTION, BOTULINUM TOXIN, 300 units ;  Surgeon: Shireen  NIEVES Mayo MD;  Location: [facility] OR [address] [place] [address];  Service: Urology;;    INSERTION, SUPRAPUBIC CATHETER N/A 12/13/2022    Procedure: INSERTION, SUPRAPUBIC CATHETER;  Surgeon: Shireen Mayo MD;  Location: [address];  Service: Urology;  Laterality: N/A;  exchange    pain pump placement      SQ Dilaudid Pump managed by Dr. Hillman, Pain Management    REMOVAL OF BONE SPUR OF FOOT Bilateral 9/16/2022    Procedure: EXCISION ARTHRITIC BONE, BILATERAL FOOT;  Surgeon: NICOLA Mcgrath;  Location: [facility];  Service: Podiatry;  Laterality: Bilateral;    REPLACEMENT OF CATHETER N/A 10/31/2019    Procedure: REPLACEMENT, CATHETER-SUPRAPUBIC;  Surgeon: Shireen Mayo MD;  Location: [address];  Service: Urology;  Laterality: N/A;    SPINAL CORD STIMULATOR REMOVAL      before [place]    SPINE SURGERY  5-13-13    CERVICAL FUSION    TONSILLECTOMY         Time Tracking:     PT Received On: 03/27/23  PT Start Time: 1445     PT Stop Time: 1510  PT Total Time (min): 25 min     Billable Minutes: Evaluation 10 and Therapeutic Activity 15       03/27/2023

## 2023-03-27 NOTE — PROGRESS NOTES
[facility] Medicine  Progress Note    Patient Name: Tasha Hawley  MRN: [id number]  Patient Class: IP- Inpatient   Admission Date: 3/22/2023  Length of Stay: 3 days  Attending Physician: Bharti Sullivan MD  Primary Care Provider: Mesfin Hodges Ii, MD        Subjective:     Principal Problem:Acute hypoxemic respiratory failure    HPI:  67 year old female with a history of CHF, cirrhosis, meadows dependence presents with shortness of breath, myalgias, and lower extremity edema. The swelling has worsened and now there is redness. She says she does not wear oxygen at home. No chest pain. Reports recently seen at [facility] for UTI and was started on dicloxacillin. She says she had diarrhea as well but this was before the abx initiation.  UA appeared grossly infected 3 days ago however no growth to date on urine culture.  Patient with lower extremity edema.  Given 80 mg Lasix in ED. chest x-ray with interstitial edema.  Patient newly on O2 requirements.  Troponin negative.  BNP negative.  Patient will be admitted to Hospital Medicine      Overview/Hospital Course:  No notes on file    Interval History:  No acute events overnight. Lasix was held for systolic BP in 90's. This morning patient is more awake and complaining of severe bilateral leg pain.  States redness in her legs had improved yesterday but has returned today.  She has been afebrile, with no other systemic signs of infection. Continues to complains of subjective dyspnea. Patient states she has been using the Bipap at night but unable to verify. Has not been using incentive spirometer. Encourage IS and mobilization with PT. Requesting for one dose of IV dilaudid for breakthrough pain in her legs. Ordered one time dose of dilaudid, consulted palliative care for pain management.     No family at bedside.    Review of Systems   Constitutional:  Positive for fatigue.   Respiratory:  Positive for shortness of breath.    Cardiovascular:  Positive  for leg swelling.   Neurological:  Negative for light-headedness.   Objective:     Vital Signs (Most Recent):  Temp: 98.6 °F (37 °C) (03/27/23 1202)  Pulse: (!) 54 (03/27/23 1202)  Resp: 16 (03/27/23 1202)  BP: 134/60 (03/27/23 1202)  SpO2: 98 % (03/27/23 1209)   Vital Signs (24h Range):  Temp:  [97.2 °F (36.2 °C)-98.6 °F (37 °C)] 98.6 °F (37 °C)  Pulse:  [48-75] 54  Resp:  [16-19] 16  SpO2:  [94 %-98 %] 98 %  BP: ()/(46-63) 134/60     Weight: 100.4 kg (221 lb 5.5 oz)  Body mass index is 34.67 kg/m².    Intake/Output Summary (Last 24 hours) at 3/27/2023 1240  Last data filed at 3/27/2023 0915  Gross per 24 hour   Intake 240 ml   Output 1775 ml   Net -1535 ml        Physical Exam  Vitals and nursing note reviewed.   Constitutional:       General: She is not in acute distress.     Appearance: She is ill-appearing.   Eyes:      General: No scleral icterus.  Cardiovascular:      Rate and Rhythm: Regular rhythm. Bradycardia present.      Pulses: Normal pulses.      Heart sounds: Normal heart sounds. No murmur heard.  Pulmonary:      Effort: Pulmonary effort is normal. No respiratory distress.      Breath sounds: No wheezing.   Abdominal:      Palpations: Abdomen is soft.      Tenderness: There is no abdominal tenderness.   Musculoskeletal:      Right lower leg: Edema present.      Left lower leg: Edema present.      Comments: Bilateral lower extremity erythema and tenderness on palpation, no palpable abscess or open wound  Lower extremity edema, redness was improving but looks worse today   Skin:     General: Skin is warm and dry.      Findings: No bruising.   Neurological:      Mental Status: She is alert and oriented to person, place, and time.       Significant Labs: All pertinent labs within the past 24 hours have been reviewed.    Significant Imaging: I have reviewed all pertinent imaging results/findings within the past 24 hours.      Assessment/Plan:      * Acute hypoxemic respiratory failure  Patient with  Hypoxic Respiratory failure which is Acute.  she is not on home oxygen. Supplemental oxygen was provided and noted-  .   Signs/symptoms of respiratory failure include- shortness of breath.     On 2 L oxygen via NC saturating 96%.  Suspect dyspnea and hypoxia multifactorial in setting of atelectasis, ?Mild volume overload (BNP maybe falsely low in obesity)    CTA chest ruled out PE, no large focal consolidation noted.  Procalcitonin negative less suggestive of pneumonia.  CT chest showed emphysema.  Not on home inhalers.  Suspect obstructive airway disease.  DuoNebs on back order.  Add Spiriva for now.  P.r.n. albuterol.  ABG showed pCO2 64, based on pH this appears to be chronic.  Per discussion with Pulmonary fellow, she would meet criteria for outpatient BiPAP use.      Incentive spirometer.  Increase mobility. Suspect subjective dyspnea may be in setting of volume overload +/- deconditioning or a systemic problem.  Patient reported lightheadedness while working with physical therapy which could be in setting of orthostasis as she was on suboptimal steroid dosing for adrenal insufficiency.  On chart review, discharged 01/2023 on total hydrocortisone 15 mg q.d..  Fludrocortisone added.  Also noted on chart review, last TSH in 01/2023 was 51.  Repeat TSH elevated to 41 but free T4 within normal limits.    s/p IV diuresis.  Transitioned to oral Lasix.  Incentive spirometer, PT, encourage mobilization.  BiPAP q.h.s.    Pulmonary consult given patient has persistent subjective dyspnea               Adrenal insufficiency    Given extreme fatigue, hypotension-will consult endocrinology for adrenal insufficiency as patient has not had a formal evaluation by endocrinology as for now, will continue hydrocortisone as recommended on last discharge summary      UTI (urinary tract infection)  Last urine culture 03/20/2023 was negative.  UA this hospital stay did not show significant pyuria.  Patient has had several previous  urine cultures with staph but also has a suprapubic catheter which may be colonized    Per patient she is supposed to be on oral dicloxacillin at home.  This drug is not available on formulary in this hospital and therefore was switched to Keflex.  Given concern for possible cellulitis of lower extremity, will do ceftriaxone for now but do not think UTI needs to be treated at this time.  Blood cultures 3/23 show no growth till date    Debility  Deconditioned with almost bed-bound status  PT/OT      S/P insertion of intrathecal pump  Follows with pain clinic outpatient   Per patient change of pump due on 03/30/2023      Lymphedema of both lower extremities  Ultrasound negative for DVT  Given 80 mg Lasix in ED  Continue gentle diuresis    Redness, tenderness and swelling suggestive of cellulitis the patient does not have systemic signs of infection.  She is chronically on steroids and therefore may not mount a same immune response.  Procalcitonin low is reassuring.  Given recurrence of erythema and severe leg pain (difficult to gauge pain given history of chronic pain syndrome) -will switch ceftriaxone to Unasyn.  No obvious indication to broaden antibiotic spectrum at this time given she is afebrile with no leukocytosis, negative blood cultures so far.  No prior history of MDRO, no obvious concern for abscess on exam.  Will get lower extremity ultrasound to rule out abscess.    Primary hypothyroidism  Last TSH 51.8 in 01/2023, TSH remains elevated at 41 but free T4 normal    Continue levothyroxine at 137 mcg q.d. for now               Neurogenic bladder  S/p suprapubic catheter      SOB (shortness of breath)  As above      Chronic pain syndrome  With intrathecal Dilaudid pump in place   Continue home dose p.r.n. Norco, Tylenol    Per patient back pain is chronic due to multiple past surgeries but patient reported worsening pain while working with PT and history of falls    Thoracolumbar spine x-ray shows no acute  fracture or displacement. Okay to work with PT/OT    Palliative care consult for chronic pain and overall goals of care        VTE Risk Mitigation (From admission, onward)         Ordered     enoxaparin injection 40 mg  Daily         03/23/23 0156     IP VTE HIGH RISK PATIENT  Once         03/23/23 0156                Discharge Planning   JACKIE: 3/27/2023     Code Status: Full Code   Is the patient medically ready for discharge?:     Reason for patient still in hospital (select all that apply): Patient trending condition and Consult recommendations  Discharge Plan A: Home Health          Bharti Sullivan MD  Department of Hospital Medicine   [facility] Surg

## 2023-03-27 NOTE — PLAN OF CARE
Patient ambulated to restroom with assist x2. Alarms set and monitored. Medications administered as ordered. Safety maintained.

## 2023-03-27 NOTE — SUBJECTIVE & OBJECTIVE
Interval History:  No acute events overnight. Lasix was held for systolic BP in 90's. This morning patient is more awake and complaining of severe bilateral leg pain.  States redness in her legs had improved yesterday but has returned today.  She has been afebrile, with no other systemic signs of infection. Continues to complains of subjective dyspnea. Patient states she has been using the Bipap at night but unable to verify. Has not been using incentive spirometer. Encourage IS and mobilization with PT. Requesting for one dose of IV dilaudid for breakthrough pain in her legs. Ordered one time dose of dilaudid, consulted palliative care for pain management.     No family at bedside.    Review of Systems   Constitutional:  Positive for fatigue.   Respiratory:  Positive for shortness of breath.    Cardiovascular:  Positive for leg swelling.   Neurological:  Negative for light-headedness.   Objective:     Vital Signs (Most Recent):  Temp: 98.6 °F (37 °C) (03/27/23 1202)  Pulse: (!) 54 (03/27/23 1202)  Resp: 16 (03/27/23 1202)  BP: 134/60 (03/27/23 1202)  SpO2: 98 % (03/27/23 1209)   Vital Signs (24h Range):  Temp:  [97.2 °F (36.2 °C)-98.6 °F (37 °C)] 98.6 °F (37 °C)  Pulse:  [48-75] 54  Resp:  [16-19] 16  SpO2:  [94 %-98 %] 98 %  BP: ()/(46-63) 134/60     Weight: 100.4 kg (221 lb 5.5 oz)  Body mass index is 34.67 kg/m².    Intake/Output Summary (Last 24 hours) at 3/27/2023 1240  Last data filed at 3/27/2023 0915  Gross per 24 hour   Intake 240 ml   Output 1775 ml   Net -1535 ml        Physical Exam  Vitals and nursing note reviewed.   Constitutional:       General: She is not in acute distress.     Appearance: She is ill-appearing.   Eyes:      General: No scleral icterus.  Cardiovascular:      Rate and Rhythm: Regular rhythm. Bradycardia present.      Pulses: Normal pulses.      Heart sounds: Normal heart sounds. No murmur heard.  Pulmonary:      Effort: Pulmonary effort is normal. No respiratory distress.       Breath sounds: No wheezing.   Abdominal:      Palpations: Abdomen is soft.      Tenderness: There is no abdominal tenderness.   Musculoskeletal:      Right lower leg: Edema present.      Left lower leg: Edema present.      Comments: Bilateral lower extremity erythema and tenderness on palpation, no palpable abscess or open wound  Lower extremity edema, redness was improving but looks worse today   Skin:     General: Skin is warm and dry.      Findings: No bruising.   Neurological:      Mental Status: She is alert and oriented to person, place, and time.       Significant Labs: All pertinent labs within the past 24 hours have been reviewed.    Significant Imaging: I have reviewed all pertinent imaging results/findings within the past 24 hours.

## 2023-03-27 NOTE — PT/OT/SLP PROGRESS
Occupational Therapy   Treatment    Name: Tasha Hawley  MRN: [id number]  Admitting Diagnosis:  Acute hypoxemic respiratory failure       Recommendations:     Discharge Recommendations: other (see comments) (post acute placement)  Discharge Equipment Recommendations:  other (see comments) (TBD)  Barriers to discharge:  Decreased caregiver support, Other (Comment) (Increased level of assistance)    Assessment:     Tasha Hawley is a 67 y.o. female with a medical diagnosis of Acute hypoxemic respiratory failure.  Performance deficits affecting function are weakness, impaired endurance, impaired self care skills, impaired functional mobility, gait instability, impaired balance, decreased upper extremity function, decreased lower extremity function, decreased safety awareness, pain, decreased ROM, impaired skin, edema. Pt found in bed, agreeable to therapy.  Pt continues with BLE pain however improving as pt was able to ambulate today.  Pt is progressing well towards goals. Continue OT services to address functional goals, progressing as able.      Rehab Prognosis:  Good; patient would benefit from acute skilled OT services to address these deficits and reach maximum level of function.       Plan:     Patient to be seen 4 x/week to address the above listed problems via self-care/home management, therapeutic activities, therapeutic exercises  Plan of Care Expires: 04/21/23  Plan of Care Reviewed with: patient    Subjective     Chief Complaint: BLE pain  Patient/Family Comments/goals: Increased [place]   Pain/Comfort:  Pain Rating 1: 8/10 (BLE and feet pain)  Pain Addressed 1: Nurse notified    Objective:     Communicated with: RN prior to session.  Patient found HOB elevated with bed alarm, peripheral IV, telemetry, meadows catheter, oxygen upon OT entry to room.    General Precautions: Standard, fall    Orthopedic Precautions:N/A  Braces: N/A  Respiratory Status: Nasal cannula      Occupational Performance:      Bed Mobility:    Patient completed Rolling/Turning to Left with  stand by assistance  Patient completed Scooting/Bridging with stand by assistance  Patient completed Supine to Sit with stand by assistance     Functional Mobility/Transfers:  Patient completed Sit <> Stand Transfer with stand by assistance  with  rolling walker   Patient completed Bed <> Chair Transfer using Stand Pivot technique with stand by assistance with rolling walker  Functional Mobility: Pt took 2 steps during transfer with CGA using RW.     Activities of Daily Living:  Grooming: modified independence chair level       AMPAC 6 Click ADL: 15    Treatment & Education:  Pt performed BUE 2 x 10 reps all jts/planes.  Pt tolerated well with rest breaks 2/2 mild SOB and muscle fatigue.     Patient left up in chair with all lines intact, call button in reach, and RN notified    GOALS:   Multidisciplinary Problems       Occupational Therapy Goals          Problem: Occupational Therapy    Goal Priority Disciplines Outcome Interventions   Occupational Therapy Goal     OT, PT/OT Ongoing, Progressing    Description: Goals to be met by: 4/21/2023     Patient will increase functional independence with ADLs by performing:    UE Dressing with Set-up Assistance with overhead clothing without complaints of increased lumbar pain  LE Dressing with Minimal Assistance to return to PLOF with incorporation of improved body mechanics (ie. Avoid trunk twisting 2/2 hx of back pain)  Toileting from toilet with Stand-by Assistance for hygiene and clothing management after bowel movement.   Toilet transfer to toilet with Stand-by Assistance with incorporation of body alignment principles.  Standing x 3 minutes with BUE support as needed, SBA for balance, and without adverse change in vital signs or reports of dizziness  Functional mobility in room to access bathroom / closet area with SBA, use of least restrictive assistive device, and incorporated energy conservation  techniques.                         Time Tracking:     OT Date of Treatment: 03/27/23  OT Start Time: 1116  OT Stop Time: 1142  OT Total Time (min): 26 min    Billable Minutes:Therapeutic Activity 15  Therapeutic Exercise 11               3/27/2023

## 2023-03-27 NOTE — PHYSICIAN QUERY
PT Name: Tasha Hawley  MR #: [id number]     DOCUMENTATION CLARIFICATION     CDS: Grant Moy RN CCDS               Contact information: [email or web address]    This form is a permanent document in the medical record.     Query Date: March 27, 2023    By submitting this query, we are merely seeking further clarification of documentation.  Please utilize your independent clinical judgment when addressing the question(s) below.    The Medical Record contains the following   Indicators Supporting Clinical Findings Location in Medical Record     x Heart Failure documented Heart failure, unspecified HF chronicity, unspecified heart failure type  history of CHF    67-year-old female with multiple medical comorbidities including but not limited to chronic diastolic heart failure 3/22/2023 ED provider notes      3/23/2023 H&P     x BNP  03/20/23 03:58 03/22/23 19:46   BNP 44 44      BNP negative but could be falsely low in setting of morbid obesity. Lab values        3/23/2023 H&P     x EF/Echo Concentric remodeling and normal systolic function.  The estimated ejection fraction is 50-55%.  There is abnormal septal wall motion.  Normal left ventricular diastolic function.  Normal right ventricular size with normal right ventricular systolic function.  Mild left atrial enlargement.  Mild tricuspid regurgitation.  Mild pulmonic regurgitation.  Intermediate central venous pressure (8 mmHg).  The estimated PA systolic pressure is 35 mmHg.    The left ventricle is normal in size with concentric remodeling and hyperdynamic systolic function.  The estimated ejection fraction is 70%.  Grade I left ventricular diastolic dysfunction.  Normal right ventricular size with normal right ventricular systolic function. 1/10/2023 Echo report                      6/7/2021 Echo report     x Radiology findings Mild interstitial pulmonary edema.    No pulmonary embolus or acute aortic syndrome seen.   Bibasal areas of scar versus discoid  atelectasis.   Hiatal hernia with organo-axial volvulus of the stomach and dilated esophagus.  And upper GI may be helpful 3/22/2023 Chest x-ray    3/24/2023 CTA chest     x Subjective/Objective Respiratory Conditions shortness of breath    Rales present. 3/22/2023 ED provider notes    3/24/2023 Hospital Medicine progress notes    Recent/Current MI      Heart Transplant, LVAD       x Edema, JVD 2-3+ pitting edema bilaterally to the lower extremities with overlying redness, warmth and tenderness.    On chart review, appears patient was suspected to have chronic venous insufficiency and chronic lower extremity edema.  On exam patient has nonpitting bilateral lower extremity edema with mild erythema, skin changes suggesting chronicity.   3/22/2023 ED provider notes      3/23/2023 H&P    Ascites       x Diuretics/Meds furosemide 80 mg IV x 1  furosemide 20 mg IV x 1  furosemide 20 mg IV x 1  furosemide 20 mg IV q12h  furosemide 40 mg PO daily 3/22/2023 Medication  3/23/2023 Medication  3/24/2023 Medication  3/25/2023 Medication  3/26/2023- Current Medication    Other Treatment       x Other Concerned for fluid overload    Lymphedema of both lower extremities  Ultrasound negative for DVT  Body mass index is 33.91 kg/m² 3/22/2023 ED provider notes    3/24/2023 Hospital Medicine progress notes     Heart failure is a clinical diagnosis which includes symptomatic fluid retention, elevated intracardiac pressures, and/or the inability of the heart to deliver adequate blood flow.    Heart Failure with reduced Ejection Fraction (HFrEF) or Systolic Heart Failure (loses ability to contract normally, EF is <40%)    Heart Failure with preserved Ejection Fraction (HFpEF) or Diastolic Heart Failure (stiff ventricles, does not relax properly, EF is >50%)     Heart Failure with Combined Systolic and Diastolic Failure (stiff ventricles, does not relax properly and EF is <50%)    Mid-range or mildly reduced ejection fraction (HFmrEF) is  classified as systolic heart failure.  Congestive heart failure with a recovered EF is classified as Diastolic Heart Failure.  Common clues to acute exacerbation:  Rapidly progressive symptoms (w/in 2 weeks of presentation), using IV diuretics, using supplemental O2, pulmonary edema on Xray, new or worsening pleural effusion, +JVD or other signs of volume overload, MI w/in 4 weeks, and/or BNP >500  The clinical guidelines noted are only system guidelines, and do not replace the providers clinical judgment.    Provider, please clarify the diagnosis of Diastolic heart failure associated with the above clinical findings.    [   ]  Acute on Chronic Diastolic Heart Failure (HFpEF) - worsening of CHF signs/symptoms in preexisting CHF   [   ]  Chronic Diastolic Heart Failure (HFpEF) - preexisting and stable   [ X  ]  Other (please specify): unclear etiology:multifactorial - related to hypothyroidism +/- diastolic heart failure ___________________________________   [  ]  Clinically Undetermined         Please document in your progress notes daily for the duration of treatment until resolved and include in your discharge summary.    References:  American Heart Association editorial staff. (2017, May). Ejection Fraction Heart Failure Measurement. American Heart Association. https://www.heart.org/en/health-topics/heart-failure/diagnosing-heart-failure/ejection-fraction-heart-failure-measurement#:~:text=Ejection%20fraction%20(EF)%20is%20a,pushed%20out%20with%20each%20heartbeat  HAO Leone (2020, December 15). Heart failure with preserved ejection fraction: Clinical manifestations and diagnosis. Believe.inToDate. https://www.[employer]cha[employer]/contents/heart-failure-with-preserved-ejection-fraction-clinical-manifestations-and-diagnosis.  ICD-10-CM/PCS Coding Clinic Third Quarter ICD-10, Effective with discharges: September 8, 2020 [facility] § Heart failure with mid-range or mildly reduced ejection fraction  (2020).  ICD-10-CM/PCS Coding Clinic Third Quarter ICD-10, Effective with discharges: September 8, 2020 [facility] § Heart failure with recovered ejection fraction (2020).  Form No. [id number]

## 2023-03-27 NOTE — ASSESSMENT & PLAN NOTE
- TSH 40.194, T3 undetectable, normal range T4  - suspect this is playing a role in her overall clinical picture (bradycardia, hypotension, weight gain, & sleep disordered breathing)  - Endocrinology following; appreciate their assistance

## 2023-03-27 NOTE — PROGRESS NOTES
IP Liaison - Initial Visit Note    Patient: Tasha Hawley  MRN:  [id number]  Date of Service:  3/27/2023  Completed by:  ML Weir    Reason for Visit   Patient presents with    IP Liaison Initial Visit       RSW met with patient at bedside in order to complete SDOH questionnaire and liaison assessment. Pt has identified no immediate social barriers to care. Pt interested in housekeeping services, pt stated she is on a waitlist for the [employer] housekeeping services. Pt expressed feeling stressed due to pt spouse sick at home, pt reports having a strong support system at home. Pt declined the need for resources at this time.    The following were addressed during this visit:  - Review SDOH Questions   - Complete patient assessment   - Complete initial visit with patient        Patient Summary     IP Liaison Patient Assessment    General  Level of Caregiver support: Member independent and does not need caregiver assistance  Have you had to make a decision between paying for any of the following in the last 2 months?: None  Transportation means: Family  Employment status: Disabled  Assessments  Was the PHQ Depression Screening completed this visit?: No  Was the GONZALO-7 Screening completed this visit?: No       ML Weir

## 2023-03-27 NOTE — SUBJECTIVE & OBJECTIVE
Past Medical History:   Diagnosis Date    Anticoagulant long-term use     Anxiety     Arthritis     Bilateral lower extremity edema     severe chronic    Carotid artery occlusion     Cataract     CHF (congestive heart failure)     Coronary artery disease     subtotalled LAD with collateral    Depression     Fever blister     Hard of hearing     Hypokalemia 1/9/2023    Hyponatremia 2/4/2022    Hypothyroid     Iron deficiency anemia     Lumbar radiculopathy     with chronic pain    Ocular migraine     Renal disorder     Sleep apnea     cpap       Past Surgical History:   Procedure Laterality Date    ADENOIDECTOMY      BACK SURGERY      x 12    CARDIAC CATHETERIZATION  2016    subtotalled LAD with right to left collaterals    CATARACT EXTRACTION W/  INTRAOCULAR LENS IMPLANT Left     Dr Coleman     CYSTOSCOPIC LITHOLAPAXY N/A 6/27/2019    Procedure: CYSTOLITHOLAPAXY;  Surgeon: Shireen Mayo MD;  Location: [facility] OR 2ND FLR;  Service: Urology;  Laterality: N/A;    CYSTOSCOPIC LITHOLAPAXY N/A 9/3/2019    Procedure: CYSTOLITHOLAPAXY;  Surgeon: Shireen Mayo MD;  Location: [facility] OR 2ND FLR;  Service: Urology;  Laterality: N/A;    CYSTOSCOPY N/A 7/13/2021    Procedure: CYSTOSCOPY;  Surgeon: Shireen Mayo MD;  Location: [facility] OR 1ST FLR;  Service: Urology;  Laterality: N/A;    CYSTOSCOPY  11/16/2021    Procedure: CYSTOSCOPY;  Surgeon: Shireen Mayo MD;  Location: [facility] OR 1ST FLR;  Service: Urology;;    CYSTOSCOPY  7/19/2022    Procedure: CYSTOSCOPY;  Surgeon: Shireen Mayo MD;  Location: [facility] OR 1ST FLR;  Service: Urology;;    CYSTOSCOPY WITH INJECTION OF PERIURETHRAL BULKING AGENT  7/19/2022    Procedure: CYSTOSCOPY, WITH PERIURETHRAL BULKING AGENT INJECTION-MACROPLASTIQUE;  Surgeon: Shireen Mayo MD;  Location: [facility] OR 1ST FLR;  Service: Urology;;    CYSTOSCOPY,WITH BOTULINUM TOXIN INJECTION N/A 12/13/2022    Procedure: CYSTOSCOPY,WITH BOTULINUM TOXIN INJECTION;  Surgeon: Shireen Mayo MD;  Location:  [facility] OR 1ST FLR;  Service: Urology;  Laterality: N/A;  300 U    ESOPHAGOGASTRODUODENOSCOPY N/A 5/23/2018    Procedure: ESOPHAGOGASTRODUODENOSCOPY (EGD);  Surgeon: Prince Vance MD;  Location: [facility] (4TH FLR);  Service: Endoscopy;  Laterality: N/A;  r/s 'd per Dr. Vance due to family emergency- ER    HYSTERECTOMY  1975    endometriosis    INJECTION OF BOTULINUM TOXIN TYPE A  7/13/2021    Procedure: INJECTION, BOTULINUM TOXIN, 200units;  Surgeon: Shireen Mayo MD;  [facility];  Service: Urology;;    INJECTION OF BOTULINUM TOXIN TYPE A  11/16/2021    Procedure: INJECTION, BOTULINUM TOXIN, 200units;  Surgeon: Shireen Mayo MD;  [facility];  Service: Urology;;    INJECTION OF BOTULINUM TOXIN TYPE A  7/19/2022    Procedure: INJECTION, BOTULINUM TOXIN, 300 units ;  Surgeon: Shireen Mayo MD;  [address]  Service: Urology;;    INSERTION, SUPRAPUBIC CATHETER N/A 12/13/2022    Procedure: INSERTION, SUPRAPUBIC CATHETER;  Surgeon: Shireen Mayo MD;  Location: [facility] OR [address] [place] [address];  Service: Urology;  Laterality: N/A;  exchange    pain pump placement      SQ Dilaudid Pump managed by Dr. Hillman, Pain Management    REMOVAL OF BONE SPUR OF FOOT Bilateral 9/16/2022    Procedure: EXCISION ARTHRITIC BONE, BILATERAL FOOT;  Surgeon: NICOLA Mcgrath;  Location: [facility] OR;  Service: Podiatry;  Laterality: Bilateral;    REPLACEMENT OF CATHETER N/A 10/31/2019    Procedure: REPLACEMENT, CATHETER-SUPRAPUBIC;  Surgeon: Shireen Mayo MD;  Location: [facility] OR [address] [place] [address];  Service: Urology;  Laterality: N/A;    SPINAL CORD STIMULATOR REMOVAL      before [place]    SPINE SURGERY  5-13-13    CERVICAL FUSION    TONSILLECTOMY         Review of patient's allergies indicates:   Allergen Reactions    (d)-limonene flavor      Other reaction(s): difficult intubation  Other reaction(s): Difficulty breathing    Bactrim [sulfamethoxazole-trimethoprim] Anaphylaxis    Benadryl  [diphenhydramine hcl] Shortness Of Breath    Fentanyl Itching, Nausea And Vomiting and Swelling             Imitrex [sumatriptan succinate] Shortness Of Breath    Percocet [oxycodone-acetaminophen] Shortness Of Breath    Topamax [topiramate] Shortness Of Breath    Vancomycin Anaphylaxis     Rash    Butorphanol tartrate     Darvocet a500 [propoxyphene n-acetaminophen]      Other reaction(s): Difficulty breathing    Evening primrose (oenothera biennis)     White petrolatum-zinc     Zinc oxide-white petrolatum      Other reaction(s): Difficulty breathing    Latex, natural rubber Itching and Rash    Phenytoin Rash and Other (See Comments)     Trouble breathing       Family History       Problem Relation (Age of Onset)    Cancer Mother (55), Father    Cirrhosis Paternal Aunt, Paternal Uncle    Colon cancer Maternal Uncle    Esophageal cancer Father    Heart disease Maternal Uncle    Liver disease Paternal Aunt, Paternal Uncle    No Known Problems Brother, Brother, Sister, Maternal Aunt, Maternal Grandfather, Paternal Grandmother, Paternal Grandfather    Parkinsonism Maternal Grandmother    Tremor Maternal Grandmother          Tobacco Use    Smoking status: Never    Smokeless tobacco: Never   Substance and Sexual Activity    Alcohol use: Never    Drug use: No    Sexual activity: Never     Partners: Male         Review of Systems   Constitutional:  Positive for fatigue. Negative for activity change and appetite change.   HENT:  Negative for congestion, rhinorrhea, sinus pressure and sinus pain.    Eyes:  Negative for discharge and itching.   Respiratory:  Positive for cough and shortness of breath.    Cardiovascular:  Positive for leg swelling. Negative for chest pain.   Gastrointestinal:  Negative for diarrhea, nausea and vomiting.   Endocrine: Negative for cold intolerance and heat intolerance.   Genitourinary:  Positive for frequency. Negative for dysuria.   Musculoskeletal:  Positive for arthralgias and back pain.    Skin:  Negative for color change and pallor.   Allergic/Immunologic: Negative for environmental allergies, food allergies and immunocompromised state.   Neurological:  Negative for dizziness and syncope.   Hematological:  Negative for adenopathy. Does not bruise/bleed easily.   Psychiatric/Behavioral:  Negative for agitation and self-injury.    Objective:     Vital Signs (Most Recent):  Temp: 98.6 °F (37 °C) (03/27/23 1202)  Pulse: (!) 54 (03/27/23 1202)  Resp: 16 (03/27/23 1202)  BP: 134/60 (03/27/23 1202)  SpO2: 98 % (03/27/23 1209)   Vital Signs (24h Range):  Temp:  [97.2 °F (36.2 °C)-98.6 °F (37 °C)] 98.6 °F (37 °C)  Pulse:  [48-75] 54  Resp:  [16-19] 16  SpO2:  [94 %-98 %] 98 %  BP: ()/(51-63) 134/60     Weight: 100.4 kg (221 lb 5.5 oz)  Body mass index is 34.67 kg/m².      Intake/Output Summary (Last 24 hours) at 3/27/2023 1615  Last data filed at 3/27/2023 1406  Gross per 24 hour   Intake 360 ml   Output 2575 ml   Net -2215 ml       Physical Exam  Constitutional:       Appearance: Normal appearance. She is obese.   HENT:      Head: Normocephalic and atraumatic.      Nose: Nose normal. No congestion.      Mouth/Throat:      Mouth: Mucous membranes are dry.      Pharynx: Oropharynx is clear.   Eyes:      Extraocular Movements: Extraocular movements intact.      Conjunctiva/sclera: Conjunctivae normal.      Pupils: Pupils are equal, round, and reactive to light.   Cardiovascular:      Rate and Rhythm: Bradycardia present.      Pulses: Normal pulses.      Heart sounds: Normal heart sounds. No murmur heard.  Pulmonary:      Effort: Pulmonary effort is normal. No respiratory distress.      Breath sounds: No wheezing.      Comments: Faint bibasilar crackles  Abdominal:      General: Abdomen is flat. Bowel sounds are normal. There is no distension.      Palpations: Abdomen is soft.      Tenderness: There is no abdominal tenderness.   Musculoskeletal:         General: Normal range of motion.      Cervical  back: Normal range of motion and neck supple.      Right lower leg: Edema present.      Left lower leg: Edema present.   Skin:     General: Skin is warm and dry.      Capillary Refill: Capillary refill takes less than 2 seconds.   Neurological:      General: No focal deficit present.      Mental Status: She is alert and oriented to person, place, and time. Mental status is at baseline.   Psychiatric:         Mood and Affect: Mood normal.         Thought Content: Thought content normal.       Vents:  Oxygen Concentration (%): 28 (03/26/23 0351)    Lines/Drains/Airways       Drain  Duration                  Suprapubic Catheter 12/13/22 100% silicone 20 Fr. 104 days              Line  Duration                  Subcutaneous Infusion Pump Abdomen (Comment) -- days              Peripheral Intravenous Line  Duration                  Peripheral IV - Single Lumen 03/24/23 2236 22 G Posterior;Right Hand 2 days                    Significant Labs:    CBC/Anemia Profile:  No results for input(s): WBC, HGB, HCT, PLT, MCV, RDW, IRON, FERRITIN, RETIC, FOLATE, [id number], OCCULTBLOOD in the last 48 hours.     Chemistries:  Recent Labs   Lab 03/26/23  0828      K 3.4*   CL 97   CO2 37*   BUN 8   CREATININE 0.8   CALCIUM 8.4*   MG 2.2       All pertinent labs within the past 24 hours have been reviewed.    Significant Imaging:   I have reviewed all pertinent imaging results/findings within the past 24 hours.

## 2023-03-27 NOTE — ASSESSMENT & PLAN NOTE
- Pt with >1 year of worsening dyspnea & SOB with rest and activity. Significant orthopnea.  - Clinically appears comfortable on exam, however the episodes are worse with laying down, activity.   - Never smoker, however no PFT in our system to evaluate lung function, volumes, or diffusing capacity  - A-a gradient normal based on ABG  - Normal EF on last ECHO, some mild TR/MR/LAE. Diastolic dysfunction  - Suspect her symptoms are multifactorial in nature: morbid obesity, edema, hypothyroidism, AI, underlying alveolar hypoventilation  - bedside ultrasound with some B lines, diaphragm moves normally on both sides    Recommendations  - Agree with continued diuresis as tolerated; would anticipate discharge with a daily oral regimen with additional PRN  - Need to address her hypothyroidism as this likely is playing a role in her overall symptoms (bradycardia, hypotension, weight gain)  - Continue NIV at night while here and at discharge; Ordering NIV for nocturnal and daytime use to decrease the risk of exacerbation. Home BiPAP will be insufficient due to the severity of his illness.  - Outpatient sleep study & PFTs  - Would perform a walk test for home oxygen prior to discharge

## 2023-03-27 NOTE — CONSULTS
ENDOCRINE CONSULT    Date: 03/27/2023    Adrenal ,thyroid evaluation.  REFERRING PROVIDER: Self, Aaareferral    REASON FOR VISIT: adrenal insufficiency,high TSH low T3 ,normal T4, fatigue and hypotension.    HPI: Tasha Hawley is a 67 y.o. female patient with a history notable for fatigue and low blood pressure. who presents in consultation for management of thyroid and adrenal disorder.     PAST MEDICAL HISTORY:  Patient Active Problem List   Diagnosis    Generalized anxiety disorder    Sleep apnea    Iron deficiency anemia    Major depressive disorder, recurrent, mild    Chronic pain syndrome    Cervical myelopathy    SOB (shortness of breath)    Narcotic dependency, continuous    Thoracic aorta atherosclerosis    Drug-induced constipation    GERD (gastroesophageal reflux disease)    Coronary artery disease of native artery with stable angina pectoris    Neurogenic bladder    Frequent falls    Primary hypothyroidism    Lymphedema of both lower extremities    Scoliosis deformity of spine    S/P insertion of spinal cord stimulator    S/P insertion of intrathecal pump    Paresthesia of both lower extremities    Degenerative disc disease, lumbar    Osteoarthritis of spine with radiculopathy, lumbar region    Bradycardia    Bilateral carotid artery disease    Insomnia due to medical condition    Suprapubic catheter    Esophageal dysphagia    Recurrent UTI    Mixed stress and urge urinary incontinence    Inflammatory spondylopathy of lumbar region    Pure hypercholesterolemia    Chronic diastolic heart failure    Partial small bowel obstruction    Constipation due to opioid therapy    Pain in both lower legs    Hypotension    Debility    History of stroke with residual deficit    Tremor    UTI (urinary tract infection)    Anxiety    Calcaneal spur of left foot    Charcot ankle, unspecified laterality    Adrenal insufficiency    Acute hypoxemic respiratory failure        PAST SURGICAL HISTORY:  Past Surgical  History:   Procedure Laterality Date    ADENOIDECTOMY      BACK SURGERY      x 12    CARDIAC CATHETERIZATION  2016    subtotalled LAD with right to left collaterals    CATARACT EXTRACTION W/  INTRAOCULAR LENS IMPLANT Left     Dr Coleman     CYSTOSCOPIC LITHOLAPAXY N/A 6/27/2019    Procedure: CYSTOLITHOLAPAXY;  Surgeon: Shireen Mayo MD;  Location: [facility] OR 2ND FLR;  Service: Urology;  Laterality: N/A;    CYSTOSCOPIC LITHOLAPAXY N/A 9/3/2019    Procedure: CYSTOLITHOLAPAXY;  Surgeon: Shireen Mayo MD;  Location: [facility] OR 2ND FLR;  Service: Urology;  Laterality: N/A;    CYSTOSCOPY N/A 7/13/2021    Procedure: CYSTOSCOPY;  Surgeon: Shireen Mayo MD;  Location: [facility] OR 1ST FLR;  Service: Urology;  Laterality: N/A;    CYSTOSCOPY  11/16/2021    Procedure: CYSTOSCOPY;  Surgeon: Shireen Mayo MD;  [facility];  Service: Urology;;    CYSTOSCOPY  7/19/2022    Procedure: CYSTOSCOPY;  Surgeon: Shireen Mayo MD;  Location: [facility] OR 1ST FLR;  Service: Urology;;    CYSTOSCOPY WITH INJECTION OF PERIURETHRAL BULKING AGENT  7/19/2022    Procedure: CYSTOSCOPY, WITH PERIURETHRAL BULKING AGENT INJECTION-MACROPLASTIQUE;  Surgeon: Shireen Mayo MD;  [facility];  Service: Urology;;    CYSTOSCOPY,WITH BOTULINUM TOXIN INJECTION N/A 12/13/2022    Procedure: CYSTOSCOPY,WITH BOTULINUM TOXIN INJECTION;  Surgeon: Shireen Mayo MD;  Location: [facility] OR 1ST FLR;  Service: Urology;  Laterality: N/A;  300 U    ESOPHAGOGASTRODUODENOSCOPY N/A 5/23/2018    Procedure: ESOPHAGOGASTRODUODENOSCOPY (EGD);  Surgeon: Prince Vance MD;  Location: [facility] (4TH FLR);  Service: Endoscopy;  Laterality: N/A;  r/s 'd per Dr. Vance due to family emergency- ER    HYSTERECTOMY  1975    endometriosis    INJECTION OF BOTULINUM TOXIN TYPE A  7/13/2021    Procedure: INJECTION, BOTULINUM TOXIN, 200units;  Surgeon: Shireen Mayo MD;  Location: [facility] OR [address] [place] [address];  Service: Urology;;    INJECTION OF BOTULINUM TOXIN  TYPE A  11/16/2021    Procedure: INJECTION, BOTULINUM TOXIN, 200units;  Surgeon: Shireen Mayo MD;  [facility];  Service: Urology;;    INJECTION OF BOTULINUM TOXIN TYPE A  7/19/2022    Procedure: INJECTION, BOTULINUM TOXIN, 300 units ;  Surgeon: Shireen Mayo MD;  [facility];  Service: Urology;;    INSERTION, SUPRAPUBIC CATHETER N/A 12/13/2022    Procedure: INSERTION, SUPRAPUBIC CATHETER;  Surgeon: Shireen Mayo MD;  [facility];  Service: Urology;  Laterality: N/A;  exchange    pain pump placement      SQ Dilaudid Pump managed by Dr. Hillman, Pain Management    REMOVAL OF BONE SPUR OF FOOT Bilateral 9/16/2022    Procedure: EXCISION ARTHRITIC BONE, BILATERAL FOOT;  Surgeon: Adam Mcguire [facility];  Location: [facility] OR;  Service: Podiatry;  Laterality: Bilateral;    REPLACEMENT OF CATHETER N/A 10/31/2019    Procedure: REPLACEMENT, CATHETER-SUPRAPUBIC;  Surgeon: Shireen Mayo MD;  Location: [facility] OR [address] [place] [address];  Service: Urology;  Laterality: N/A;    SPINAL CORD STIMULATOR REMOVAL      before Larissa    SPINE SURGERY  5-13-13    CERVICAL FUSION    TONSILLECTOMY          MEDICATIONS:  Current Facility-Administered Medications   Medication Dose Route Frequency Provider Last Rate Last Admin    aluminum-magnesium hydroxide-simethicone 200-200-20 mg/5 mL suspension 30 mL  30 mL Oral Q6H PRN Bharti Sullivan MD   30 mL at 03/25/23 0932    aspirin EC tablet 81 mg  81 mg Oral Daily Betito Patel NP   81 mg at 03/27/23 0913    atorvastatin tablet 80 mg  80 mg Oral Daily Betito Patel NP   80 mg at 03/27/23 0913    butalbital-acetaminophen-caffeine -40 mg per tablet 1 tablet  1 tablet Oral Daily PRN Betito Patel NP   1 tablet at 03/26/23 2133    ceFAZolin (ANCEF) 1 gram in dextrose 5 % 50 mL IVPB (premix)  1 g Intravenous Q8H Bharti Sullivan MD   Stopped at 03/27/23 1200    enoxaparin injection 40 mg  40 mg Subcutaneous Daily Betito KRAMER  SHODNA Patel   40 mg at 03/26/23 1748    fludrocortisone tablet 100 mcg  100 mcg Oral Daily Bharti Sullivan MD   100 mcg at 03/27/23 0913    FLUoxetine capsule 60 mg  60 mg Oral Daily Betito Patel NP   60 mg at 03/27/23 0912    furosemide tablet 40 mg  40 mg Oral Daily Bharti Sullivan MD   40 mg at 03/27/23 1210    HYDROcodone-acetaminophen 5-325 mg per tablet 1 tablet  1 tablet Oral Q8H PRN Betito Patel NP   1 tablet at 03/27/23 0914    hydrocortisone tablet 10 mg  10 mg Oral Daily Betito Patel NP   10 mg at 03/27/23 0912    hydrocortisone tablet 5 mg  5 mg Oral Daily PM Bharti Sullivan MD   5 mg at 03/26/23 1750    levothyroxine tablet 137 mcg  137 mcg Oral Before breakfast Betito Patel NP   137 mcg at 03/27/23 0543    mupirocin 2 % ointment   Nasal BID Bharti Sullivan MD   Given at 03/27/23 0914    pantoprazole EC tablet 40 mg  40 mg Oral BID Bharti Sullivan MD   40 mg at 03/27/23 0912    polyethylene glycol packet 17 g  17 g Oral BID PRN HEVER Kilpatrick        potassium bicarbonate disintegrating tablet 20 mEq  20 mEq Oral BID Bharti Sullivan MD   20 mEq at 03/27/23 0913    potassium bicarbonate disintegrating tablet 40 mEq  40 mEq Oral Once Bharti Sullivan MD        QUEtiapine tablet 200 mg  200 mg Oral QHS Betito Patel NP   200 mg at 03/26/23 2127    senna tablet 8.6 mg  8.6 mg Oral Daily PRN HEVER Kilpatrick   8.6 mg at 03/27/23 0608    tiotropium bromide 2.5 mcg/actuation inhaler 2 puff  2 puff Inhalation Daily Bharti Sullivan MD   2 puff at 03/27/23 0914    traZODone tablet 50 mg  50 mg Oral QHS Annie Ramirez NP   50 mg at 03/26/23 2126       ALLERGIES:  Review of patient's allergies indicates:   Allergen Reactions    (d)-limonene flavor      Other reaction(s): difficult intubation  Other reaction(s): Difficulty breathing    Bactrim [sulfamethoxazole-trimethoprim] Anaphylaxis    Benadryl [diphenhydramine hcl] Shortness Of Breath    Fentanyl  Itching, Nausea And Vomiting and Swelling             Imitrex [sumatriptan succinate] Shortness Of Breath    Percocet [oxycodone-acetaminophen] Shortness Of Breath    Topamax [topiramate] Shortness Of Breath    Vancomycin Anaphylaxis     Rash    Butorphanol tartrate     Darvocet a500 [propoxyphene n-acetaminophen]      Other reaction(s): Difficulty breathing    Evening primrose (oenothera biennis)     White petrolatum-zinc     Zinc oxide-white petrolatum      Other reaction(s): Difficulty breathing    Latex, natural rubber Itching and Rash    Phenytoin Rash and Other (See Comments)     Trouble breathing       SOCIAL HISTORY:  Social History     Socioeconomic History    Marital status:    Tobacco Use    Smoking status: Never    Smokeless tobacco: Never   Substance and Sexual Activity    Alcohol use: Never    Drug use: No    Sexual activity: Never     Partners: Male     Social Determinants of Health     Financial Resource Strain: Medium Risk    Difficulty of Paying Living Expenses: Somewhat hard   Food Insecurity: No Food Insecurity    Worried About Running Out of Food in the Last Year: Never true    Ran Out of Food in the Last Year: Never true   Transportation Needs: Unmet Transportation Needs    Lack of Transportation (Medical): Yes    Lack of Transportation (Non-Medical): Yes   Physical Activity: Inactive    Days of Exercise per Week: 0 days    Minutes of Exercise per Session: 0 min   Stress: Stress Concern Present    Feeling of Stress : Rather much   Social Connections: Moderately Isolated    Frequency of Communication with Friends and Family: More than three times a week    Frequency of Social Gatherings with Friends and Family: More than three times a week    Attends Alevism Services: Never    Active Member of Clubs or Organizations: No    Attends Club or Organization Meetings: Never    Marital Status:    Housing Stability: Low Risk     Unable to Pay for Housing in the Last Year: No    Number of  "Places Lived in the Last Year: 1    Unstable Housing in the Last Year: No         FAMILY HISTORY:  Family History   Problem Relation Age of Onset    Cancer Mother 55        breast    Cancer Father         esophagus,had laryngectomy    Esophageal cancer Father     Parkinsonism Maternal Grandmother     Tremor Maternal Grandmother     No Known Problems Brother     No Known Problems Brother     Heart disease Maternal Uncle     Colon cancer Maternal Uncle         Less than 60    No Known Problems Sister     No Known Problems Maternal Aunt     Cirrhosis Paternal Aunt         ETOH    Liver disease Paternal Aunt         ETOH    Liver disease Paternal Uncle         ETOH    Cirrhosis Paternal Uncle         ETOH    No Known Problems Maternal Grandfather     No Known Problems Paternal Grandmother     No Known Problems Paternal Grandfather     Melanoma Neg Hx     Amblyopia Neg Hx     Blindness Neg Hx     Cataracts Neg Hx     Diabetes Neg Hx     Glaucoma Neg Hx     Hypertension Neg Hx     Macular degeneration Neg Hx     Retinal detachment Neg Hx     Strabismus Neg Hx     Stroke Neg Hx     Thyroid disease Neg Hx     Celiac disease Neg Hx     Colon polyps Neg Hx     Cystic fibrosis Neg Hx     Crohn's disease Neg Hx     Inflammatory bowel disease Neg Hx     Liver cancer Neg Hx     Rectal cancer Neg Hx     Stomach cancer Neg Hx     Ulcerative colitis Neg Hx     Lymphoma Neg Hx          REVIEW OF SYSTEMS:  ROS   As her present illness, lost of hearing    PHYSICAL EXAMINATION:  Vital Signs: /60 (Patient Position: Lying)   Pulse (!) 54   Temp 98.6 °F (37 °C) (Oral)   Resp 16   Ht 5' 7" (1.702 m)   Wt 100.4 kg (221 lb 5.5 oz)   LMP  (LMP Unknown)   SpO2 98%   BMI 34.67 kg/m²     Physical Exam  Vitals and nursing note reviewed.   Constitutional:       Appearance: Normal appearance.   HENT:      Head: Normocephalic and atraumatic.      Nose: Nose normal.      Mouth/Throat:      Mouth: Mucous membranes are moist.      Pharynx: " Oropharynx is clear.   Eyes:      Extraocular Movements: Extraocular movements intact.      Conjunctiva/sclera: Conjunctivae normal.      Pupils: Pupils are equal, round, and reactive to light.   Cardiovascular:      Rate and Rhythm: Normal rate and regular rhythm.      Pulses: Normal pulses.      Heart sounds: Normal heart sounds.   Pulmonary:      Effort: Pulmonary effort is normal.      Breath sounds: Normal breath sounds.   Abdominal:      General: Abdomen is flat. Bowel sounds are normal.      Palpations: Abdomen is soft.   Musculoskeletal:      Comments: Both legs with pressure stockings tender to palpation edema plus 2.   Skin:     General: Skin is dry.      Capillary Refill: Capillary refill takes less than 2 seconds.   Neurological:      Mental Status: She is alert. Mental status is at baseline.   Psychiatric:         Behavior: Behavior normal.          LABS  Admission on 03/22/2023   Component Date Value Ref Range Status    WBC 03/22/2023 5.32  3.90 - 12.70 K/uL Final    RBC 03/22/2023 3.92 (L)  4.00 - 5.40 M/uL Final    Hemoglobin 03/22/2023 10.3 (L)  12.0 - 16.0 g/dL Final    Hematocrit 03/22/2023 33.1 (L)  37.0 - 48.5 % Final    MCV 03/22/2023 84  82 - 98 fL Final    MCH 03/22/2023 26.3 (L)  27.0 - 31.0 pg Final    MCHC 03/22/2023 31.1 (L)  32.0 - 36.0 g/dL Final    RDW 03/22/2023 15.7 (H)  11.5 - 14.5 % Final    Platelets 03/22/2023 266  150 - 450 K/uL Final    MPV 03/22/2023 9.0 (L)  9.2 - 12.9 fL Final    Immature Granulocytes 03/22/2023 0.4  0.0 - 0.5 % Final    Gran # (ANC) 03/22/2023 4.4  1.8 - 7.7 K/uL Final    Immature Grans (Abs) 03/22/2023 0.02  0.00 - 0.04 K/uL Final    Comment: Mild elevation in immature granulocytes is non specific and   can be seen in a variety of conditions including stress response,   acute inflammation, trauma and pregnancy. Correlation with other   laboratory and clinical findings is essential.      Lymph # 03/22/2023 0.4 (L)  1.0 - 4.8 K/uL Final    Mono # 03/22/2023  0.3  0.3 - 1.0 K/uL Final    Eos # 03/22/2023 0.2  0.0 - 0.5 K/uL Final    Baso # 03/22/2023 0.00  0.00 - 0.20 K/uL Final    nRBC 03/22/2023 0  0 /100 WBC Final    Gran % 03/22/2023 83.2 (H)  38.0 - 73.0 % Final    Lymph % 03/22/2023 6.8 (L)  18.0 - 48.0 % Final    Mono % 03/22/2023 5.8  4.0 - 15.0 % Final    Eosinophil % 03/22/2023 3.8  0.0 - 8.0 % Final    Basophil % 03/22/2023 0.0  0.0 - 1.9 % Final    Differential Method 03/22/2023 Automated   Final    Sodium 03/22/2023 140  136 - 145 mmol/L Final    Potassium 03/22/2023 3.2 (L)  3.5 - 5.1 mmol/L Final    Chloride 03/22/2023 95  95 - 110 mmol/L Final    CO2 03/22/2023 33 (H)  23 - 29 mmol/L Final    Glucose 03/22/2023 92  70 - 110 mg/dL Final    BUN 03/22/2023 6 (L)  8 - 23 mg/dL Final    Creatinine 03/22/2023 1.0  0.5 - 1.4 mg/dL Final    Calcium 03/22/2023 9.0  8.7 - 10.5 mg/dL Final    Total Protein 03/22/2023 7.4  6.0 - 8.4 g/dL Final    Albumin 03/22/2023 3.4 (L)  3.5 - 5.2 g/dL Final    Total Bilirubin 03/22/2023 0.5  0.1 - 1.0 mg/dL Final    Comment: For infants and newborns, interpretation of results should be based  on gestational age, weight and in agreement with clinical  observations.    Premature Infant recommended reference ranges:  Up to 24 hours.............<8.0 mg/dL  Up to 48 hours............<12.0 mg/dL  3-5 days..................<15.0 mg/dL  6-29 days.................<15.0 mg/dL      Alkaline Phosphatase 03/22/2023 107  55 - 135 U/L Final    AST 03/22/2023 13  10 - 40 U/L Final    ALT 03/22/2023 9 (L)  10 - 44 U/L Final    Anion Gap 03/22/2023 12  8 - 16 mmol/L Final    eGFR 03/22/2023 >60  >60 mL/min/1.73 m^2 Final    Troponin I 03/22/2023 <0.006  0.000 - 0.026 ng/mL Final    Comment: The reference interval for Troponin I represents the 99th percentile   cutoff   for our facility and is consistent with 3rd generation assay   performance.      BNP 03/22/2023 44  0 - 99 pg/mL Final    Values of less than 100 pg/ml are consistent with non-CHF  populations.    Specimen UA 03/23/2023 Urine, Supra Pubic   Final    Color, UA 03/23/2023 Yellow  Yellow, Straw, [place]ola Final    Appearance, UA 03/23/2023 Clear  Clear Final    pH, UA 03/23/2023 6.0  5.0 - 8.0 Final    Specific Gravity, UA 03/23/2023 1.010  1.005 - 1.030 Final    Protein, UA 03/23/2023 Negative  Negative Final    Comment: Recommend a 24 hour urine protein or a urine   protein/creatinine ratio if globulin induced proteinuria is  clinically suspected.      Glucose, UA 03/23/2023 Negative  Negative Final    Ketones, UA 03/23/2023 Negative  Negative Final    Bilirubin (UA) 03/23/2023 Negative  Negative Final    Occult Blood UA 03/23/2023 1+ (A)  Negative Final    Nitrite, UA 03/23/2023 Negative  Negative Final    Urobilinogen, UA 03/23/2023 Negative  <2.0 EU/dL Final    Leukocytes, UA 03/23/2023 2+ (A)  Negative Final    Blood Culture, Routine 03/23/2023 No Growth to date   Preliminary    Blood Culture, Routine 03/23/2023 No Growth to date   Preliminary    Blood Culture, Routine 03/23/2023 No Growth to date   Preliminary    Blood Culture, Routine 03/23/2023 No Growth to date   Preliminary    Procalcitonin 03/23/2023 0.10  <0.25 ng/mL Final    Comment: A concentration < 0.25 ng/mL represents a low risk of bacterial   infection.  Procalcitonin may not be accurate among patients with localized   infection, recent trauma or major surgery, immunosuppressed state,   invasive fungal infection, renal dysfunction. Decisions regarding   initiation or continuation of antibiotic therapy should not be based   solely on procalcitonin levels.      SARS-CoV-2 RNA, Amplification, Qual 03/23/2023 Negative  Negative Final    Comment: This test utilizes isothermal nucleic acid amplification technology   to   detect the SARS-CoV-2 RdRp nucleic acid segment. The analytical   sensitivity   (limit of detection) is 500 copies/swab.     A POSITIVE result is indicative of the presence of SARS-CoV-2 RNA;   clinical   correlation  with patient history and other diagnostic information is   necessary to determine patient infection status.    A NEGATIVE result means that SARS-CoV-2 nucleic acids are not present   above   the limit of detection. A NEGATIVE result should be treated as   presumptive.   It does not rule out the possibility of COVID-19 and should not be   the sole   basis for treatment decisions. If COVID-19 is strongly suspected   based on   clinical and exposure history, re-testing using an alternate   molecular assay   should be considered.     This test is only for use under the Food and Drug Administration s   Emergency   Use Authorization (EUA).     Commercial kits are provided by [employer]. [employer]                           e   characteristics of the EUA have been independently verified by   [facility] Department of Pathology and Laboratory Medicine.   _________________________________________________________________   The authorized Fact Sheet for Healthcare Providers and the authorized   Fact   Sheet for Patients of the ID NOW COVID-19 are available on the FDA   website:   https://www.fda.gov/media/837563/download   https://www.fda.gov/media/343695/download      RBC, UA 03/23/2023 2  0 - 4 /hpf Final    WBC, UA 03/23/2023 8 (H)  0 - 5 /hpf Final    Bacteria 03/23/2023 Rare  None-Occ /hpf Final    Squam Epithel, UA 03/23/2023 0  /hpf Final    Microscopic Comment 03/23/2023 SEE COMMENT   Final    Comment: Other formed elements not mentioned in the report are not   present in the microscopic examination.       Sodium 03/24/2023 139  136 - 145 mmol/L Final    Potassium 03/24/2023 3.4 (L)  3.5 - 5.1 mmol/L Final    Chloride 03/24/2023 96  95 - 110 mmol/L Final    CO2 03/24/2023 37 (H)  23 - 29 mmol/L Final    Glucose 03/24/2023 107  70 - 110 mg/dL Final    BUN 03/24/2023 7 (L)  8 - 23 mg/dL Final    Creatinine 03/24/2023 0.9  0.5 - 1.4 mg/dL Final    Calcium 03/24/2023 8.5 (L)  8.7 - 10.5 mg/dL Final     Anion Gap 03/24/2023 6 (L)  8 - 16 mmol/L Final    eGFR 03/24/2023 >60  >60 mL/min/1.73 m^2 Final    Magnesium 03/24/2023 2.0  1.6 - 2.6 mg/dL Final    POC PH 03/24/2023 7.377  7.35 - 7.45 Final    POC PCO2 03/24/2023 64.8 (HH)  35 - 45 mmHg Final    POC PO2 03/24/2023 73 (L)  80 - 100 mmHg Final    POC HCO3 03/24/2023 38.1 (H)  24 - 28 mmol/L Final    POC BE 03/24/2023 13  -2 to 2 mmol/L Final    POC SATURATED O2 03/24/2023 93 (L)  95 - 100 % Final    POC Sodium 03/24/2023 140  136 - 145 mmol/L Final    POC Potassium 03/24/2023 3.8  3.5 - 5.1 mmol/L Final    POC TCO2 03/24/2023 40 (H)  23 - 27 mmol/L Final    POC Hematocrit 03/24/2023 32 (L)  36 - 54 %PCV Final    POC HEMOGLOBIN 03/24/2023 11  g/dL Final    Sample 03/24/2023 ARTERIAL   Final    Site 03/24/2023 RR   Final    Allens Test 03/24/2023 Pass   Final    DelSys 03/24/2023 Nasal Can   Final    TSH 03/24/2023 40.194 (H)  0.400 - 4.000 uIU/mL Final    Free T4 03/24/2023 0.72  0.71 - 1.51 ng/dL Final    Sodium 03/25/2023 141  136 - 145 mmol/L Final    Potassium 03/25/2023 3.6  3.5 - 5.1 mmol/L Final    Chloride 03/25/2023 97  95 - 110 mmol/L Final    CO2 03/25/2023 35 (H)  23 - 29 mmol/L Final    Glucose 03/25/2023 83  70 - 110 mg/dL Final    BUN 03/25/2023 6 (L)  8 - 23 mg/dL Final    Creatinine 03/25/2023 0.8  0.5 - 1.4 mg/dL Final    Calcium 03/25/2023 8.6 (L)  8.7 - 10.5 mg/dL Final    Anion Gap 03/25/2023 9  8 - 16 mmol/L Final    eGFR 03/25/2023 >60  >60 mL/min/1.73 m^2 Final    Sodium 03/26/2023 140  136 - 145 mmol/L Final    Potassium 03/26/2023 3.4 (L)  3.5 - 5.1 mmol/L Final    Chloride 03/26/2023 97  95 - 110 mmol/L Final    CO2 03/26/2023 37 (H)  23 - 29 mmol/L Final    Glucose 03/26/2023 102  70 - 110 mg/dL Final    BUN 03/26/2023 8  8 - 23 mg/dL Final    Creatinine 03/26/2023 0.8  0.5 - 1.4 mg/dL Final    Calcium 03/26/2023 8.4 (L)  8.7 - 10.5 mg/dL Final    Anion Gap 03/26/2023 6 (L)  8 - 16 mmol/L Final    eGFR 03/26/2023 >60  >60 mL/min/1.73  m^2 Final    Magnesium 03/26/2023 2.2  1.6 - 2.6 mg/dL Final    POCT Glucose 03/26/2023 130 (H)  70 - 110 mg/dL Final    POCT Glucose 03/27/2023 115 (H)  70 - 110 mg/dL Final    POC PH 03/27/2023 7.383  7.35 - 7.45 Final    POC PCO2 03/27/2023 59.9 (HH)  35 - 45 mmHg Final    POC PO2 03/27/2023 79 (L)  80 - 100 mmHg Final    POC HCO3 03/27/2023 35.7 (H)  24 - 28 mmol/L Final    POC BE 03/27/2023 11  -2 to 2 mmol/L Final    POC SATURATED O2 03/27/2023 95  95 - 100 % Final    POC TCO2 03/27/2023 37 (H)  23 - 27 mmol/L Final    Sample 03/27/2023 ARTERIAL   Final    Site 03/27/2023 LR   Final    Allens Test 03/27/2023 Pass   Final    DelSys 03/27/2023 Nasal Can   Final              X-ray Thoracolumbar Spine AP Lateral  Narrative: EXAMINATION:  XR THORACOLUMBAR SPINE AP LATERAL    CLINICAL HISTORY:  h/o falls, back pain with multiple sx- rule out fracture;    TECHNIQUE:  AP and lateral views of the thoracolumbar spine were performed.    COMPARISON:  None.    FINDINGS:  There appear to be retained dorsal column stimulator electrodes within the lower thoracic spine.  There is a baclofen pump present with the tip of the catheter at the T11 level.  Patient is s/p spinal fusion with spinal rods and pedicle screws extending from L3 to L5 on the left and L3 dated S1 on the right.  There is sacralization of the transverse process of L5 on the left.  There are interbody spacers present at the L3-4 and L4-5 levels.  There is a retained screw fragment at the L5 level on the right.  Laminectomy defects are present within the lower lumbar spine.  There is dextro convexity of the lumbar spine with a rotatory component.  Posterior vertebral alignment appears satisfactory.  There is no evidence for acute fracture or bone destruction.  There are degenerative changes with disc space narrowing noted most prominently at the L1-L2 level with marginal osteophytes present at multiple levels.  No abnormal paraspinal masses are identified.   Sacroiliac joints appear grossly unremarkable.  Impression: Surgical changes as detailed above.    No evidence for acute fracture, bone destruction, or subluxation.    Dextro convexity of the lumbar spine with rotatory component.    Lumbar spondylosis.    Electronically signed by: Camilo Chavira MD  Date:    03/25/2023  Time:    12:28          Problem List Items Addressed This Visit          Pulmonary    * (Principal) Acute hypoxemic respiratory failure     Other Visit Diagnoses       Heart failure, unspecified HF chronicity, unspecified heart failure type    -  Primary    Shortness of breath        Relevant Orders    EKG 12-lead (Completed)    Swelling        Relevant Orders    US Lower Extremity Veins Bilateral (Completed)    Heart burn        Relevant Orders    EKG 12-lead (Completed)             RECOMMENDATION: 67 years old white female with multiple medical problems including hypokalemia,hypotension , hypothyroid, under florinef and hydrocortisone and thyroid replacement ,her T3 free is low and her TSH is elevated, recommendations add cytomel 25 mcg qd, and increase synthroid to 150 mcg qd, keep the same adrenal therapy. Thanks for the consult continue to follow.

## 2023-03-27 NOTE — ASSESSMENT & PLAN NOTE
With intrathecal Dilaudid pump in place   Continue home dose p.r.n. Norco, Tylenol    Per patient back pain is chronic due to multiple past surgeries but patient reported worsening pain while working with PT and history of falls    Thoracolumbar spine x-ray shows no acute fracture or displacement. Okay to work with PT/OT    Palliative care consult for chronic pain and overall goals of care

## 2023-03-27 NOTE — ASSESSMENT & PLAN NOTE
Patient with Hypoxic Respiratory failure which is Acute.  she is not on home oxygen. Supplemental oxygen was provided and noted-  .   Signs/symptoms of respiratory failure include- shortness of breath.     On 2 L oxygen via NC saturating 96%.  Suspect dyspnea and hypoxia multifactorial in setting of atelectasis, ?Mild volume overload (BNP maybe falsely low in obesity)    CTA chest ruled out PE, no large focal consolidation noted.  Procalcitonin negative less suggestive of pneumonia.  CT chest showed emphysema.  Not on home inhalers.  Suspect obstructive airway disease.  DuoNebs on back order.  Add Spiriva for now.  P.r.n. albuterol.  ABG showed pCO2 64, based on pH this appears to be chronic.  Per discussion with Pulmonary fellow, she would meet criteria for outpatient BiPAP use.      Incentive spirometer.  Increase mobility. Suspect subjective dyspnea may be in setting of volume overload +/- deconditioning or a systemic problem.  Patient reported lightheadedness while working with physical therapy which could be in setting of orthostasis as she was on suboptimal steroid dosing for adrenal insufficiency.  On chart review, discharged 01/2023 on total hydrocortisone 15 mg q.d..  Fludrocortisone added.  Also noted on chart review, last TSH in 01/2023 was 51.  Repeat TSH elevated to 41 but free T4 within normal limits.    s/p IV diuresis.  Transitioned to oral Lasix.  Incentive spirometer, PT, encourage mobilization.  BiPAP q.h.s.    Pulmonary consult given patient has persistent subjective dyspnea

## 2023-03-27 NOTE — NURSING
Patient c/o difficulty breathing. RR 20. SpO2 99% on 2 L via NC. MD Nate, notified. ABG and consult to pulmonology ordered.

## 2023-03-27 NOTE — PLAN OF CARE
Problem: Occupational Therapy  Goal: Occupational Therapy Goal  Description: Goals to be met by: 4/21/2023     Patient will increase functional independence with ADLs by performing:    UE Dressing with Set-up Assistance with overhead clothing without complaints of increased lumbar pain  LE Dressing with Minimal Assistance to return to PLOF with incorporation of improved body mechanics (ie. Avoid trunk twisting 2/2 hx of back pain)  Toileting from toilet with Stand-by Assistance for hygiene and clothing management after bowel movement.   Toilet transfer to toilet with Stand-by Assistance with incorporation of body alignment principles.  Standing x 3 minutes with BUE support as needed, SBA for balance, and without adverse change in vital signs or reports of dizziness  Functional mobility in room to access bathroom / closet area with SBA, use of least restrictive assistive device, and incorporated energy conservation techniques.    Outcome: Ongoing, Progressing   Tasha Hawley is a 67 y.o. female with a medical diagnosis of Acute hypoxemic respiratory failure.  Performance deficits affecting function are weakness, impaired endurance, impaired self care skills, impaired functional mobility, gait instability, impaired balance, decreased upper extremity function, decreased lower extremity function, decreased safety awareness, pain, decreased ROM, impaired skin, edema. Pt found in bed, agreeable to therapy.  Pt continues with BLE pain however improving as pt was able to ambulate today.  Pt is progressing well towards goals. Continue OT services to address functional goals, progressing as able.

## 2023-03-27 NOTE — PHYSICIAN QUERY
PT Name: Tasha Hawley  MR #: [id number]     DOCUMENTATION CLARIFICATION      CDS: Grant Moy RN CCDS               Contact information: [email or web address]    This form is a permanent document in the medical record.    Query Date: March 27, 2023    By submitting this query, we are merely seeking further clarification of documentation to reflect the severity of illness of your patient. Please utilize your independent clinical judgment when addressing the question(s) below.     The Medical Record contains the following:   Indicators   Supporting Clinical Findings Location in Medical Record     x Urinalysis UA appeared grossly infected 3 days ago however no growth to date on urine culture.       03/23/23 14:16   Specimen UA Urine, Supra Pubic   Color, UA Yellow   Appearance, UA Clear   Specific Gravity, UA 1.010   pH, UA 6.0   Protein, UA Negative   Glucose, UA Negative   Ketones, UA Negative   Occult Blood UA 1+    NITRITE UA Negative   UROBILINOGEN UA Negative   Bilirubin (UA) Negative   Leukocytes, UA 2+    RBC, UA 2   WBC, UA 8 (H)   Bacteria, UA Rare   Squam Epithel, UA 0    3/23/2023 H&P      Lab values     x WBC  03/22/23 19:46   WBC 5.32    Lab value      Subjective/Objective Genitourinary Assessment Findings      Radiology Findings       x Culture We do not have dicloxacillin in house, therefore switched to Keflex for MSSA UTI.  Will need to clarify duration of therapy since last urine culture on 03/20 was negative. 3/23/2023 H&P     x Medication/Treatment Started on dicloxacillin  Will continue antibiotics as patient is immunosuppressed with chronic steroid therapy    Per patient she is supposed to be on oral dicloxacillin at home.  This drug is not available on formulary in this hospital and therefore was switched to Keflex.  Given concern for possible cellulitis of lower extremity, will do ceftriaxone for now but do not think UTI needs to be treated at this time.  3/23/2023 H&P        3/24/2023  Hospital Medicine progress notes     x Other UTI (urinary tract infection)  UTI diagnosed at outside facility  Patient has chronic Motley catheter      neurogenic bladder s/p suprapubic catheter   recurrent UTIs    Looking back she has had multiple urine cultures positive for MSSA but in setting of a suprapubic catheter this may be colonized.   3/23/2023 H&P    She has been afebrile, with no other systemic signs of infection.   UA this hospital stay did not show significant pyuria. 3/23/2023 H&P                        3/27/2023 Hospital Medicine progress notes      Provider, please clarify the diagnosis related to the above documentation:    [   ] Urinary Tract Infection due to or associated with chronic suprapubic catheter    [   ] Urinary Tract Infection unrelated to chronic suprapubic catheter   [ X  ] Asymptomatic bacteriuria/Colonization only   [   ] Other diagnosis (please specify): ________________________   [   ] Clinically Undetermined         Please document in your progress notes daily for the duration of treatment until resolved, and include in your discharge summary.  Form No. [id number]

## 2023-03-27 NOTE — ASSESSMENT & PLAN NOTE
Ultrasound negative for DVT  Given 80 mg Lasix in ED  Continue gentle diuresis    Redness, tenderness and swelling suggestive of cellulitis the patient does not have systemic signs of infection.  She is chronically on steroids and therefore may not mount a same immune response.  Procalcitonin low is reassuring.  Given recurrence of erythema and severe leg pain (difficult to gauge pain given history of chronic pain syndrome) -will switch ceftriaxone to Unasyn.  No obvious indication to broaden antibiotic spectrum at this time given she is afebrile with no leukocytosis, negative blood cultures so far.  No prior history of MDRO, no obvious concern for abscess on exam.  Will get lower extremity ultrasound to rule out abscess.

## 2023-03-28 ENCOUNTER — TELEPHONE (OUTPATIENT)
Dept: UROLOGY | Facility: CLINIC | Age: 68
End: 2023-03-28
Payer: MEDICARE

## 2023-03-28 ENCOUNTER — PATIENT OUTREACH (OUTPATIENT)
Dept: ADMINISTRATIVE | Facility: OTHER | Age: 68
End: 2023-03-28
Payer: MEDICARE

## 2023-03-28 DIAGNOSIS — N39.41 URGE INCONTINENCE: ICD-10-CM

## 2023-03-28 LAB — BACTERIA BLD CULT: NORMAL

## 2023-03-28 PROCEDURE — 97530 THERAPEUTIC ACTIVITIES: CPT | Mod: HCNC,CO

## 2023-03-28 PROCEDURE — 99497 PR ADVNCD CARE PLAN 30 MIN: ICD-10-PCS | Mod: HCNC,25,, | Performed by: STUDENT IN AN ORGANIZED HEALTH CARE EDUCATION/TRAINING PROGRAM

## 2023-03-28 PROCEDURE — 97110 THERAPEUTIC EXERCISES: CPT | Mod: HCNC,CO

## 2023-03-28 PROCEDURE — 99223 PR INITIAL HOSPITAL CARE,LEVL III: ICD-10-PCS | Mod: HCNC,,, | Performed by: STUDENT IN AN ORGANIZED HEALTH CARE EDUCATION/TRAINING PROGRAM

## 2023-03-28 PROCEDURE — 63600175 PHARM REV CODE 636 W HCPCS: Mod: HCNC | Performed by: STUDENT IN AN ORGANIZED HEALTH CARE EDUCATION/TRAINING PROGRAM

## 2023-03-28 PROCEDURE — 1158F PR ADVANCE CARE PLANNING DISCUSS DOCUMENTED IN MEDICAL RECORD: ICD-10-PCS | Mod: HCNC,CPTII,, | Performed by: STUDENT IN AN ORGANIZED HEALTH CARE EDUCATION/TRAINING PROGRAM

## 2023-03-28 PROCEDURE — 99497 ADVNCD CARE PLAN 30 MIN: CPT | Mod: HCNC,25,, | Performed by: STUDENT IN AN ORGANIZED HEALTH CARE EDUCATION/TRAINING PROGRAM

## 2023-03-28 PROCEDURE — 94640 AIRWAY INHALATION TREATMENT: CPT | Mod: HCNC

## 2023-03-28 PROCEDURE — 99900035 HC TECH TIME PER 15 MIN (STAT): Mod: HCNC

## 2023-03-28 PROCEDURE — 63600175 PHARM REV CODE 636 W HCPCS: Mod: HCNC | Performed by: HOSPITALIST

## 2023-03-28 PROCEDURE — 94761 N-INVAS EAR/PLS OXIMETRY MLT: CPT | Mod: HCNC

## 2023-03-28 PROCEDURE — 1152F PR DOC ADVANCED DISEASE DX, GOAL OF CARE PRIORITIZE COMFORT: ICD-10-PCS | Mod: HCNC,CPTII,, | Performed by: STUDENT IN AN ORGANIZED HEALTH CARE EDUCATION/TRAINING PROGRAM

## 2023-03-28 PROCEDURE — 97110 THERAPEUTIC EXERCISES: CPT | Mod: HCNC,CQ

## 2023-03-28 PROCEDURE — 97530 THERAPEUTIC ACTIVITIES: CPT | Mod: HCNC,CQ

## 2023-03-28 PROCEDURE — 97116 GAIT TRAINING THERAPY: CPT | Mod: HCNC,CQ

## 2023-03-28 PROCEDURE — 25000003 PHARM REV CODE 250: Mod: HCNC | Performed by: STUDENT IN AN ORGANIZED HEALTH CARE EDUCATION/TRAINING PROGRAM

## 2023-03-28 PROCEDURE — 25000003 PHARM REV CODE 250: Mod: HCNC | Performed by: NURSE PRACTITIONER

## 2023-03-28 PROCEDURE — 25000003 PHARM REV CODE 250: Mod: HCNC | Performed by: REGISTERED NURSE

## 2023-03-28 PROCEDURE — 99223 1ST HOSP IP/OBS HIGH 75: CPT | Mod: HCNC,,, | Performed by: STUDENT IN AN ORGANIZED HEALTH CARE EDUCATION/TRAINING PROGRAM

## 2023-03-28 PROCEDURE — 11000001 HC ACUTE MED/SURG PRIVATE ROOM: Mod: HCNC

## 2023-03-28 PROCEDURE — 94799 UNLISTED PULMONARY SVC/PX: CPT | Mod: HCNC

## 2023-03-28 PROCEDURE — 94660 CPAP INITIATION&MGMT: CPT | Mod: HCNC

## 2023-03-28 PROCEDURE — 1158F ADVNC CARE PLAN TLK DOCD: CPT | Mod: HCNC,CPTII,, | Performed by: STUDENT IN AN ORGANIZED HEALTH CARE EDUCATION/TRAINING PROGRAM

## 2023-03-28 PROCEDURE — 1152F DOC ADVNCD DIS COMFORT 1ST: CPT | Mod: HCNC,CPTII,, | Performed by: STUDENT IN AN ORGANIZED HEALTH CARE EDUCATION/TRAINING PROGRAM

## 2023-03-28 PROCEDURE — 63600175 PHARM REV CODE 636 W HCPCS: Mod: HCNC | Performed by: REGISTERED NURSE

## 2023-03-28 PROCEDURE — 25000003 PHARM REV CODE 250: Mod: HCNC | Performed by: INTERNAL MEDICINE

## 2023-03-28 RX ORDER — HYDROCODONE BITARTRATE AND ACETAMINOPHEN 7.5; 325 MG/1; MG/1
1 TABLET ORAL EVERY 6 HOURS PRN
Status: DISCONTINUED | OUTPATIENT
Start: 2023-03-28 | End: 2023-03-29

## 2023-03-28 RX ORDER — MIRABEGRON 50 MG/1
50 TABLET, FILM COATED, EXTENDED RELEASE ORAL DAILY
Qty: 90 TABLET | Refills: 3 | Status: ON HOLD | OUTPATIENT
Start: 2023-03-28 | End: 2023-05-30 | Stop reason: HOSPADM

## 2023-03-28 RX ORDER — FUROSEMIDE 10 MG/ML
20 INJECTION INTRAMUSCULAR; INTRAVENOUS
Status: DISCONTINUED | OUTPATIENT
Start: 2023-03-28 | End: 2023-03-29

## 2023-03-28 RX ORDER — PREGABALIN 50 MG/1
50 CAPSULE ORAL 2 TIMES DAILY
Status: DISCONTINUED | OUTPATIENT
Start: 2023-03-28 | End: 2023-03-31

## 2023-03-28 RX ORDER — PANTOPRAZOLE SODIUM 40 MG/1
40 TABLET, DELAYED RELEASE ORAL DAILY
Status: DISCONTINUED | OUTPATIENT
Start: 2023-03-29 | End: 2023-04-03 | Stop reason: HOSPADM

## 2023-03-28 RX ADMIN — HYDROCORTISONE 10 MG: 5 TABLET ORAL at 08:03

## 2023-03-28 RX ADMIN — LEVOTHYROXINE SODIUM 150 MCG: 25 TABLET ORAL at 05:03

## 2023-03-28 RX ADMIN — ATORVASTATIN CALCIUM 80 MG: 40 TABLET, FILM COATED ORAL at 08:03

## 2023-03-28 RX ADMIN — CEFAZOLIN SODIUM 1 G: 1 SOLUTION INTRAVENOUS at 03:03

## 2023-03-28 RX ADMIN — ASPIRIN 81 MG: 81 TABLET, COATED ORAL at 08:03

## 2023-03-28 RX ADMIN — MUPIROCIN: 20 OINTMENT TOPICAL at 08:03

## 2023-03-28 RX ADMIN — HYDROCODONE BITARTRATE AND ACETAMINOPHEN 1 TABLET: 7.5; 325 TABLET ORAL at 08:03

## 2023-03-28 RX ADMIN — TRAZODONE HYDROCHLORIDE 50 MG: 50 TABLET ORAL at 08:03

## 2023-03-28 RX ADMIN — POTASSIUM BICARBONATE 20 MEQ: 391 TABLET, EFFERVESCENT ORAL at 08:03

## 2023-03-28 RX ADMIN — HYDROCORTISONE 5 MG: 5 TABLET ORAL at 05:03

## 2023-03-28 RX ADMIN — LIOTHYRONINE SODIUM 25 MCG: 25 TABLET ORAL at 08:03

## 2023-03-28 RX ADMIN — CEFAZOLIN SODIUM 1 G: 1 SOLUTION INTRAVENOUS at 11:03

## 2023-03-28 RX ADMIN — FLUOXETINE HYDROCHLORIDE 60 MG: 20 CAPSULE ORAL at 08:03

## 2023-03-28 RX ADMIN — QUETIAPINE FUMARATE 200 MG: 100 TABLET ORAL at 08:03

## 2023-03-28 RX ADMIN — CEFAZOLIN SODIUM 1 G: 1 SOLUTION INTRAVENOUS at 08:03

## 2023-03-28 RX ADMIN — FUROSEMIDE 20 MG: 10 INJECTION, SOLUTION INTRAMUSCULAR; INTRAVENOUS at 04:03

## 2023-03-28 RX ADMIN — TIOTROPIUM BROMIDE INHALATION SPRAY 2 PUFF: 3.12 SPRAY, METERED RESPIRATORY (INHALATION) at 08:03

## 2023-03-28 RX ADMIN — PANTOPRAZOLE SODIUM 40 MG: 40 TABLET, DELAYED RELEASE ORAL at 08:03

## 2023-03-28 RX ADMIN — PREGABALIN 50 MG: 50 CAPSULE ORAL at 08:03

## 2023-03-28 RX ADMIN — HYDROCODONE BITARTRATE AND ACETAMINOPHEN 1 TABLET: 5; 325 TABLET ORAL at 04:03

## 2023-03-28 RX ADMIN — FLUDROCORTISONE ACETATE 100 MCG: 0.1 TABLET ORAL at 08:03

## 2023-03-28 RX ADMIN — HYDROCODONE BITARTRATE AND ACETAMINOPHEN 1 TABLET: 5; 325 TABLET ORAL at 12:03

## 2023-03-28 RX ADMIN — FUROSEMIDE 20 MG: 10 INJECTION, SOLUTION INTRAMUSCULAR; INTRAVENOUS at 08:03

## 2023-03-28 RX ADMIN — ENOXAPARIN SODIUM 40 MG: 40 INJECTION SUBCUTANEOUS at 04:03

## 2023-03-28 NOTE — CONSULTS
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 Pt has hx neurogenic bladder and extensive prior admissions and ED visits for urinary retention and/or cystitis. 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 Pt was recently treated for a ?staph UTI at [facility] with dicloxacillin.    Chief Complaint Leading to Admission    Pt presented to ED on 3/23 reporting progressive dependent edema and SOB without CP. CXR showed fluid overload and pt required NC for mild hypoxia. Troponin negative, BNP not elevated. Received 80mg IV lasix in ED for CHF and admitted to  service.    Interval Hospital Course    3/24: Effectively diuresing. Became SOB and lightheaded during PT. Concern for adrenal crisis due to known chronic insufficiency on 10mg hydrocortisone daily; of note pt was recommended for 15mg qAM/5mg qHS during last hospital stay; reordered higher regimen.    3/25: Reports improved SOB but severe breakthrough leg pains treated with 0.2mg IV dilaudid x 1.    3/26: Reported severe fatigue; behavior withdrawn.     3/27: Persistent subjective dyspnea. Endo consulted and ordered ACTH and cortisol. Low dose Norco ordered PRN for breakthrough pains to avoid IV dependence.    3/28: ACTH and cortisol returned wnl; hypotension and hypokalemia suspected 2/2 lasix and steroids per endo.    Palliative has been consulted for pain control.    At this time pt denies  dysuria and complains primarily of intractable shooting/burning pains radiating from her low back to her feet, present x years and driving her high basal intrathecal dose (equivalent to 3.85mg/hr IV hydromorphone, or 26mg/hr IV morphine).    Pt reports allodynia to light touch and moderate to severe tenderness to palpation. Tenderness is not generalized to the upper extremities. Abdomen is likewise nontender. Usual home rate of intrathecal hydromorphone is infusing.    Pt reports she is minimally responsive to Norco and I advised her that given her high intrathecal rate I do not expect she will respond to these comparatively weak doses particularly as her pain is overwhelmingly neuropathic in origin and will need adjuvant treatments to improve. Pt states she felt very fatigued with borderline homeopathic gabapentin dosing in the past (100mg qhs) though pt apparently continued that regimen for years. Pregabalin has a better profile in terms of side effects vs benefits and per chart review pt had discussed this medication in the past but has never taken it. She is agreeable to start introductory dosing (50mg po bid) and monitor her tolerance.    Pt is resistant to ACP discussion stating she does not know the all the details of her care and defers most questions to her family but states she does have a living will which is not on file. She wishes for full code status for potentially reversible causes but would not accept long term life support. Writer will contact pt's  for collateral and if pt's living will cannot be located will recommend drafting a new one with 's involvement.    Past Medical History:   Diagnosis Date    Anticoagulant long-term use     Anxiety     Arthritis     Bilateral lower extremity edema     severe chronic    Carotid artery occlusion     Cataract     CHF (congestive heart failure)     Coronary artery disease     subtotalled LAD with collateral    Depression     Fever blister      Hard of hearing     Hypokalemia 1/9/2023    Hyponatremia 2/4/2022    Hypothyroid     Iron deficiency anemia     Lumbar radiculopathy     with chronic pain    Ocular migraine     Renal disorder     Sleep apnea     cpap     Past Surgical History:   Procedure Laterality Date    ADENOIDECTOMY      BACK SURGERY      x 12    CARDIAC CATHETERIZATION  2016    subtotalled LAD with right to left collaterals    CATARACT EXTRACTION W/  INTRAOCULAR LENS IMPLANT Left     Dr Coleman     CYSTOSCOPIC LITHOLAPAXY N/A 6/27/2019    Procedure: CYSTOLITHOLAPAXY;  Surgeon: Shireen Mayo MD;  Location: [facility] OR 2ND FLR;  Service: Urology;  Laterality: N/A;    CYSTOSCOPIC LITHOLAPAXY N/A 9/3/2019    Procedure: CYSTOLITHOLAPAXY;  Surgeon: Shireen Mayo MD;  Location: [facility] OR 2ND FLR;  Service: Urology;  Laterality: N/A;    CYSTOSCOPY N/A 7/13/2021    Procedure: CYSTOSCOPY;  Surgeon: Shireen Mayo MD;  Location: [facility] OR 1ST FLR;  Service: Urology;  Laterality: N/A;    CYSTOSCOPY  11/16/2021    Procedure: CYSTOSCOPY;  Surgeon: Shireen Mayo MD;  Location: [facility] OR 1ST FLR;  Service: Urology;;    CYSTOSCOPY  7/19/2022    Procedure: CYSTOSCOPY;  Surgeon: Shireen Mayo MD;  Location: [facility] OR 1ST FLR;  Service: Urology;;    CYSTOSCOPY WITH INJECTION OF PERIURETHRAL BULKING AGENT  7/19/2022    Procedure: CYSTOSCOPY, WITH PERIURETHRAL BULKING AGENT INJECTION-MACROPLASTIQUE;  Surgeon: Shireen Mayo MD;  Location: [facility] OR 1ST FLR;  Service: Urology;;    CYSTOSCOPY,WITH BOTULINUM TOXIN INJECTION N/A 12/13/2022    Procedure: CYSTOSCOPY,WITH BOTULINUM TOXIN INJECTION;  Surgeon: Shireen Mayo MD;  Location: [facility] OR 1ST FLR;  Service: Urology;  Laterality: N/A;  300 U    ESOPHAGOGASTRODUODENOSCOPY N/A 5/23/2018    Procedure: ESOPHAGOGASTRODUODENOSCOPY (EGD);  Surgeon: Prince Vance MD;  Location: [facility] (4TH FLR);  Service: Endoscopy;  Laterality: N/A;  r/s 'd per Dr. Vance due to family emergency- ER    HYSTERECTOMY   1975    endometriosis    INJECTION OF BOTULINUM TOXIN TYPE A  7/13/2021    Procedure: INJECTION, BOTULINUM TOXIN, 200units;  Surgeon: Shireen Mayo MD;  Location: [facility] OR [address] [place] [address];  Service: Urology;;    INJECTION OF BOTULINUM TOXIN TYPE A  11/16/2021    Procedure: INJECTION, BOTULINUM TOXIN, 200units;  Surgeon: Shireen Mayo MD;  Location: [facility] OR [address] [place] [address];  Service: Urology;;    INJECTION OF BOTULINUM TOXIN TYPE A  7/19/2022    Procedure: INJECTION, BOTULINUM TOXIN, 300 units ;  Surgeon: Shireen Mayo MD;  Location: [facility] OR [address] [place] [address];  Service: Urology;;    INSERTION, SUPRAPUBIC CATHETER N/A 12/13/2022    Procedure: INSERTION, SUPRAPUBIC CATHETER;  Surgeon: Shireen Mayo MD;  Location: [facility] OR [address] [place] [address];  Service: Urology;  Laterality: N/A;  exchange    pain pump placement      SQ Dilaudid Pump managed by Dr. Hillman, Pain Management    REMOVAL OF BONE SPUR OF FOOT Bilateral 9/16/2022    Procedure: EXCISION ARTHRITIC BONE, BILATERAL FOOT;  Surgeon: NICOLA Mcgrath;  Location: [facility];  Service: Podiatry;  Laterality: Bilateral;    REPLACEMENT OF CATHETER N/A 10/31/2019    Procedure: REPLACEMENT, CATHETER-SUPRAPUBIC;  Surgeon: Shireen Mayo MD;  Location: [facility] OR [address] [place] [address];  Service: Urology;  Laterality: N/A;    SPINAL CORD STIMULATOR REMOVAL      before [facility]    SPINE SURGERY  5-13-13    CERVICAL FUSION    TONSILLECTOMY       Family History   Problem Relation Age of Onset    Cancer Mother 55        breast    Cancer Father         esophagus,had laryngectomy    Esophageal cancer Father     Parkinsonism Maternal Grandmother     Tremor Maternal Grandmother     No Known Problems Brother     No Known Problems Brother     Heart disease Maternal Uncle     Colon cancer Maternal Uncle         Less than 60    No Known Problems Sister     No Known Problems Maternal Aunt     Cirrhosis Paternal Aunt         ETOH    Liver disease Paternal Aunt         ETOH    Liver disease Paternal Uncle         ETOH     Cirrhosis Paternal Uncle         ETOH    No Known Problems Maternal Grandfather     No Known Problems Paternal Grandmother     No Known Problems Paternal Grandfather     Melanoma Neg Hx     Amblyopia Neg Hx     Blindness Neg Hx     Cataracts Neg Hx     Diabetes Neg Hx     Glaucoma Neg Hx     Hypertension Neg Hx     Macular degeneration Neg Hx     Retinal detachment Neg Hx     Strabismus Neg Hx     Stroke Neg Hx     Thyroid disease Neg Hx     Celiac disease Neg Hx     Colon polyps Neg Hx     Cystic fibrosis Neg Hx     Crohn's disease Neg Hx     Inflammatory bowel disease Neg Hx     Liver cancer Neg Hx     Rectal cancer Neg Hx     Stomach cancer Neg Hx     Ulcerative colitis Neg Hx     Lymphoma Neg Hx      Social History     Tobacco Use    Smoking status: Never    Smokeless tobacco: Never   Substance Use Topics    Alcohol use: Never    Drug use: No       Mental Status: Oriented x3, Engaged    ECOG Performance Status Grade: 4 - Completely disabled    Review of Systems:  Positive for fatigue, back pain, leg pain.    Review of Symptoms      Symptom Assessment (ESAS 0-10 Scale)  Pain:  0  Dyspnea:  0  Anxiety:  0  Nausea:  0  Depression:  0  Anorexia:  0  Fatigue:  0  Insomnia:  0  Restlessness:  0  Agitation:  0     CAM / Delirium:  Negative  Constipation:  Positive  Diarrhea:  Negative      ECOG Performance Status rdGrdrrdarddrderd:rd rd3rd Living Arrangements:  Lives with family and Lives in home    Psychosocial/Cultural:   See Palliative Psychosocial Note: No  Social Issues Identified: no  Bereavement Risk: non  Caregiver Needs Discussed. Caregiver Distress: no  Cultural: none  **Primary  to Follow**  Palliative Care  Consult: No    Spiritual:  F - Mindi and Belief:  Presybeterian  I - Importance:  Low  C - Community:  None active  A - Address in Care:  Health system     Time-Based Charting:  Yes  Chart Review: 28 minutes  Face to Face: 11 minutes  Symptom Assessment: 23 minutes  Coordination of Care:   minutes  Discharge Plannin minutes  Advance Care Plannin minutes  Goals of Care: 11 minutes    Total Time Spent: 95 minutes    Advance Care Planning   Advance Directives:   Living Will: No    LaPOST: No    Do Not Resuscitate Status: No    Medical Power of : No    Agent's Name:  Dangelo Hawley (spouse)   Agent's Contact Number:  [phone]    Decision Making:  Patient answered questions  Goals of Care: What is most important right now is to focus on symptom/pain control, extending life as long as possible, even it it means sacrificing quality. Accordingly, we have decided that the best plan to meet the patient's goals includes continuing with treatment.       OBJECTIVE:     Physical Exam  Constitutional:       General: She is not in acute distress.     Appearance: She is obese. She is not toxic-appearing.   HENT:      Head: Normocephalic and atraumatic.      Mouth/Throat:      Mouth: Mucous membranes are moist.      Pharynx: Oropharynx is clear.      Comments: edentulous  Eyes:      Extraocular Movements: Extraocular movements intact.      Comments: Pupils pinpoint   Cardiovascular:      Rate and Rhythm: Regular rhythm. Bradycardia present.      Pulses: Normal pulses.      Heart sounds: Normal heart sounds. No murmur heard.    No friction rub. No gallop.   Pulmonary:      Effort: Pulmonary effort is normal. No respiratory distress.      Breath sounds: Examination of the right-lower field reveals decreased breath sounds. Examination of the left-lower field reveals decreased breath sounds. Decreased breath sounds present. No wheezing.   Abdominal:      General: Abdomen is flat. Bowel sounds are decreased. There is no distension.      Palpations: Abdomen is soft.      Tenderness: There is no abdominal tenderness.   Musculoskeletal:      Right lower leg: Edema present.      Left lower leg: Edema present.      Comments: nonpitting   Skin:     Coloration: Skin is pale and sallow.   Neurological:       Mental Status: She is alert and oriented to person, place, and time. Mental status is at baseline.      Motor: Weakness (flaccid paraplegia of LLE; RLE 3/5 strength) present.      Comments: Allodynia of bilateral legs R>>L     ASSESSMENT/PLAN:     67F with pmh of adrenal insufficiency, spinal injury c/b flaccid paraplegia, neurogenic bladder, neurogenic bowel exacerbated by chronic high dose intrathecal hydromorphone. Admitted to  service for CHF exacerbation of unclear provocation with concern for adrenal crisis (I/s/o insufficient chronic steroid dosing) effectively ruled out by endo workup. Palliative consulted for control of breakthrough leg pains.    Resolving CHF is most compelling as the etiology of pt's acute on chronic pain. Pt may derive psychological benefit from po hydrocodone now modestly liberalized by writer however pt has long warranted adjuvant neuropathy tx and has only trialed trivial doses of gabapentin citing 'fatigue' which is a constant complaint and while polypharmacy is clearly an issue I am hopeful that pregabalin will be tolerated and effective given few remaining options to control her pain.    Recommendations:  Medical: supportive care / CHF mgmt per primary  Symptom Management:   - Continue home hydromorphone pump  - Increased Norco dose modestly from 5mg q8h prn -> 7.5mg q6h prn; minimal increase in OMEs is assessed as overall low risk  - Started pregabalin 50mg po bid  - If 'new' fatigue develops this should be temporary and we will encourage pt to continue for at least 14 days; if pt refuses to comply, remaining fallback options include Marinol (very safe) or SNRI tx with duloxetine or venlafaxine (higher risks of polypharmacy and QTc/CYP issues)  Psychosocial: isolated, very dependent due to severe deficits at baseline  Legal: spouse has HCPOA, reportedly living will on file - will investigate  Prognosis: unlimited, no concrete terminal hospice dx at this time    Robert Stein,  MD  Hospice and Palliative Medicine  Palliative Care Pager: [phone]    Advance Care Planning     Date: 03/28/2023    Living Will  During this visit, I engaged the patient  in the advance care planning process.  The patient and I reviewed the role for advance directives and their purpose in directing future healthcare if the patient's unable to speak for him/herself.  At this point in time, the patient does have full decision-making capacity.  We discussed different extreme health states that she could experience, and reviewed what kind of medical care she would want in those situations.  The patient communicated that if she were comatose and had little chance of a meaningful recovery, she would not want machines/life-sustaining treatments used. In addition to the above preference, other important end-of-life issues for the patient include pain control. The patient has completed a living will to reflect these preferences which is not on file and will be obtained via pt's spouse or re-drafted if unable to be located. I spent a total of 19 minutes engaging the patient in this advance care planning discussion.

## 2023-03-28 NOTE — NURSING
PROACTIVE ROUNDING NOTE       Time of Visit: 0515    PIV , LFA 20G placed under US with dark slow blood return noted.

## 2023-03-28 NOTE — ASSESSMENT & PLAN NOTE
Ultrasound negative for DVT  Given 80 mg Lasix in ED  Continue gentle diuresis; on lasix 20mg bid    Redness, tenderness and swelling suggestive of cellulitis the patient does not have systemic signs of infection.  She is chronically on steroids and therefore may not mount a strong immune response.  Procalcitonin low is reassuring.  Given recurrence of erythema and severe leg pain (difficult to gauge pain given history of chronic pain syndrome) -had switch to cefazolin; appears to be improving for now  -anticipate transition to Keflex tomorrow

## 2023-03-28 NOTE — ASSESSMENT & PLAN NOTE
- Pt with >1 year of worsening dyspnea & SOB with rest and activity. Significant orthopnea with activity  - Clinically appears comfortable on exam, however the episodes are worse with laying down, activity.   - Never smoker, however no PFT in our system to evaluate lung function, volumes, or diffusing capacity  - A-a gradient normal based on ABG  - Normal EF on last ECHO, some mild TR/MR/LAE. Diastolic dysfunction  - Suspect her symptoms are multifactorial in nature: morbid obesity, edema, hypothyroidism, AI, underlying alveolar hypoventilation  - bedside ultrasound with some B lines, diaphragm moves normally on both sides    Recommendations  - Continued diuresis as tolerated; would anticipate discharge with a daily oral regimen with additional PRN  - Continue to work on her hypothyroidism regimen  - Continue PPI daily for GERD and reflux; has issues with aspiration. Needs follow-up with GI at [facility] (she has attempted to schedule it multiple times herself without avail)  - Ordering NIV for daytime & nighttime use to decrease the risk of exacerbation. Home Bipap will not be sufficient due to the severity of her illness. Providing volume ventilation will help decrease CO2 levels & readmission changes  - Outpatient sleep study & PFTs  - Would perform a walk test for home oxygen prior to discharge

## 2023-03-28 NOTE — PROGRESS NOTES
[facility] Medicine  Progress Note    Patient Name: Tasha Hawley  MRN: [id number]  Patient Class: IP- Inpatient   Admission Date: 3/22/2023  Length of Stay: 4 days  Attending Physician: Nic Mistry, *  Primary Care Provider: Mesfin Hodges Ii, MD        Subjective:     Principal Problem:Acute respiratory failure with hypoxia and hypercarbia        HPI:  67 year old female with a history of CHF, cirrhosis, meadows dependence presents with shortness of breath, myalgias, and lower extremity edema. The swelling has worsened and now there is redness. She says she does not wear oxygen at home. No chest pain. Reports recently seen at [facility] for UTI and was started on dicloxacillin. She says she had diarrhea as well but this was before the abx initiation.  UA appeared grossly infected 3 days ago however no growth to date on urine culture.  Patient with lower extremity edema.  Given 80 mg Lasix in ED. chest x-ray with interstitial edema.  Patient newly on O2 requirements.  Troponin negative.  BNP negative.  Patient will be admitted to Hospital Medicine      Overview/Hospital Course:  No notes on file    Interval History:  Doing reasonably well today.  Still having some shortness of breath and states that her legs are still swollen, although this is improving.  Cellulitis appears to be improving; anticipate switch to oral regimen tomorrow.  Discussed with pulmonology and would benefit from noninvasive ventilation at time of discharge.  Suspect that her symptoms are multifactorial.  Also discussed with palliative care to assist with pain control regimen as she has had significant difficulty in managing her chronic pain over multiple hospitalizations.    Review of Systems   Constitutional:  Positive for fatigue.   Respiratory:  Positive for shortness of breath.    Cardiovascular:  Positive for leg swelling.   Neurological:  Negative for light-headedness.   Objective:     Vital Signs (Most  Recent):  Temp: 97.3 °F (36.3 °C) (03/28/23 1544)  Pulse: (!) 49 (03/28/23 1609)  Resp: 19 (03/28/23 1544)  BP: (!) 103/52 (03/28/23 1544)  SpO2: 99 % (03/28/23 1544)   Vital Signs (24h Range):  Temp:  [97.3 °F (36.3 °C)-99.8 °F (37.7 °C)] 97.3 °F (36.3 °C)  Pulse:  [49-60] 49  Resp:  [16-20] 19  SpO2:  [90 %-99 %] 99 %  BP: ()/(50-65) 103/52     Weight: 100.4 kg (221 lb 5.5 oz)  Body mass index is 34.67 kg/m².    Intake/Output Summary (Last 24 hours) at 3/28/2023 1810  Last data filed at 3/28/2023 0945  Gross per 24 hour   Intake 180 ml   Output 1500 ml   Net -1320 ml        Physical Exam  Vitals and nursing note reviewed.   Constitutional:       General: She is not in acute distress.     Appearance: She is ill-appearing.   Eyes:      General: No scleral icterus.  Cardiovascular:      Rate and Rhythm: Regular rhythm. Bradycardia present.      Pulses: Normal pulses.      Heart sounds: Normal heart sounds. No murmur heard.  Pulmonary:      Effort: Pulmonary effort is normal. No respiratory distress.      Breath sounds: No wheezing.   Abdominal:      Palpations: Abdomen is soft.      Tenderness: There is no abdominal tenderness.   Musculoskeletal:      Right lower leg: Edema present.      Left lower leg: Edema present.      Comments: Bilateral lower extremity erythema and tenderness on palpation, no palpable abscess or open wound  Lower extremity edema, redness improved   Skin:     General: Skin is warm and dry.      Findings: No bruising.   Neurological:      Mental Status: She is alert and oriented to person, place, and time.       Significant Labs: All pertinent labs within the past 24 hours have been reviewed.    Significant Imaging: I have reviewed all pertinent imaging results/findings within the past 24 hours.      Assessment/Plan:      * Acute respiratory failure with hypoxia and hypercarbia  Patient with Hypoxic Respiratory failure which is Acute.  she is not on home oxygen. Supplemental oxygen was  provided and noted- Oxygen Concentration (%):  [40] 40.   Signs/symptoms of respiratory failure include- shortness of breath.     On 2 L oxygen via NC saturating 96%.  Suspect dyspnea and hypoxia multifactorial in setting of atelectasis, ?Mild volume overload (BNP maybe falsely low in obesity)    CTA chest ruled out PE, no large focal consolidation noted.  Procalcitonin negative less suggestive of pneumonia.  CT chest showed emphysema.  Not on home inhalers.  Suspect obstructive airway disease.  DuoNebs on back order.  Add Spiriva for now.  P.r.n. albuterol.  ABG showed pCO2 64, based on pH this appears to be chronic.  Per discussion with Pulmonary fellow, she would meet criteria for outpatient BiPAP use.      Incentive spirometer.  Increase mobility. Suspect subjective dyspnea may be in setting of volume overload +/- deconditioning or a systemic problem.  Patient reported lightheadedness while working with physical therapy which could be in setting of orthostasis as she was on suboptimal steroid dosing for adrenal insufficiency.  On chart review, discharged 01/2023 on total hydrocortisone 15 mg q.d..  Fludrocortisone added.  Also noted on chart review, last TSH in 01/2023 was 51.  Repeat TSH elevated to 41 but free T4 within normal limits.    s/p IV diuresis, continue. Incentive spirometer, PT, encourage mobilization.  BiPAP q.h.s.    Pulmonary consult given patient has persistent subjective dyspnea  - multifactorial due to chronic aspiration, volume overload, hypothyroidism    Adrenal insufficiency  Given extreme fatigue, hypotension-consulted endocrinology for adrenal insufficiency as patient has not had a formal evaluation by endocrinology as for now, will continue hydrocortisone as recommended on last discharge summary      UTI (urinary tract infection)  Last urine culture 03/20/2023 was negative.  UA this hospital stay did not show significant pyuria.  Patient has had several previous urine cultures with staph  but also has a suprapubic catheter which may be colonized    Per patient she is supposed to be on oral dicloxacillin at home.  This drug is not available on formulary in this hospital and therefore was switched to Keflex.  Given concern for possible cellulitis of lower extremity, will do ceftriaxone for now but do not think UTI needs to be treated at this time.  Blood cultures 3/23 show no growth till date    Debility  Deconditioned with almost bed-bound status  PT/OT      S/P insertion of intrathecal pump  Follows with pain clinic outpatient   Per patient change of pump due on 03/30/2023      Lymphedema of both lower extremities  Ultrasound negative for DVT  Given 80 mg Lasix in ED  Continue gentle diuresis; on lasix 20mg bid    Redness, tenderness and swelling suggestive of cellulitis the patient does not have systemic signs of infection.  She is chronically on steroids and therefore may not mount a strong immune response.  Procalcitonin low is reassuring.  Given recurrence of erythema and severe leg pain (difficult to gauge pain given history of chronic pain syndrome) -had switch to cefazolin; appears to be improving for now  -anticipate transition to Keflex tomorrow    Primary hypothyroidism  Last TSH 51.8 in 01/2023, TSH remains elevated at 41 but free T4 normal    Increased synthroid to 150mcg; added cytomel 25mg qd              Neurogenic bladder  S/p suprapubic catheter      GERD (gastroesophageal reflux disease)  -continue daily PPI      SOB (shortness of breath)  As above      Chronic pain syndrome  With intrathecal Dilaudid pump in place   Continue home dose p.r.n. Norco, Tylenol    Per patient back pain is chronic due to multiple past surgeries but patient reported worsening pain while working with PT and history of falls    Thoracolumbar spine x-ray shows no acute fracture or displacement. Okay to work with PT/OT    Palliative care consult for chronic pain and overall goals of care  - added pregabalin  and increased norco      Sleep apnea  -would benefit from noninvasive ventilation   -ordered      VTE Risk Mitigation (From admission, onward)         Ordered     enoxaparin injection 40 mg  Daily         03/23/23 0156     IP VTE HIGH RISK PATIENT  Once         03/23/23 0156                Discharge Planning   JACKIE: 3/27/2023     Code Status: Full Code   Is the patient medically ready for discharge?:     Reason for patient still in hospital (select all that apply): Patient trending condition, Treatment, Consult recommendations and PT / OT recommendations  Discharge Plan A: Home Health                  Nic Mistry MD  Department of Hospital Medicine   [facility]

## 2023-03-28 NOTE — ASSESSMENT & PLAN NOTE
- TSH 40.194, T3 undetectable, normal range T4  - suspect this is playing a role in her overall clinical picture (bradycardia, hypotension, weight gain, & sleep disordered breathing)  - Endocrinology following; appreciate their assistance  - Continue synthryoid

## 2023-03-28 NOTE — PLAN OF CARE
Pending auth for trilogy.  Pt will discharge home at time of d/c with [facility] home [facility].  Referral sent and f/u appts requested.    Future Appointments   Date Time Provider Department Center   4/4/2023  3:00 PM Kaitlynn Caro MD [facility] ALLIMM [facility]   4/28/2023  1:40 PM Omaira Henriquez NP [facility] SLEEP [facility]   5/4/2023 11:00 AM Norma Pearson MD [facility] ENDODIA [facility]   7/7/2023 11:00 AM Mesfin Hodges II, MD [facility] DANY [facility]        03/28/23 1438   Post-Acute Status   Post-Acute Authorization HME   HME Status Pending payor review       Ameya Porter, RN   Supervisor Case Management-[facility]  [phone]

## 2023-03-28 NOTE — PROGRESS NOTES
[facility]  Pulmonology  Progress Note    Patient Name: Tasha Hawley  MRN: [id number]  Admission Date: 3/22/2023  Hospital Length of Stay: 4 days  Code Status: Full Code  Attending Provider: Nic Mistry, *  Primary Care Provider: Mesfin Hodges Ii, MD   Principal Problem: Acute respiratory failure with hypoxia and hypercarbia    Subjective:     Interval History: No events overnight. Still dyspneic with activity with PT/OT today. Tolerating a diet, still having reflux symptoms (follows with GI @ [facility]).     Objective:     Vital Signs (Most Recent):  Temp: 97.8 °F (36.6 °C) (03/28/23 1132)  Pulse: (!) 53 (03/28/23 1132)  Resp: 16 (03/28/23 1221)  BP: (!) 98/52 (03/28/23 1132)  SpO2: 99 % (03/28/23 1132)   Vital Signs (24h Range):  Temp:  [97.5 °F (36.4 °C)-99.8 °F (37.7 °C)] 97.8 °F (36.6 °C)  Pulse:  [49-60] 53  Resp:  [16-20] 16  SpO2:  [90 %-99 %] 99 %  BP: ()/(50-65) 98/52     Weight: 100.4 kg (221 lb 5.5 oz)  Body mass index is 34.67 kg/m².      Intake/Output Summary (Last 24 hours) at 3/28/2023 1242  Last data filed at 3/28/2023 0945  Gross per 24 hour   Intake 540 ml   Output 2875 ml   Net -2335 ml       Physical Exam  Constitutional:       Appearance: She is obese.   HENT:      Head: Normocephalic and atraumatic.      Nose: Nose normal. No congestion.      Mouth/Throat:      Mouth: Mucous membranes are moist.      Pharynx: Oropharynx is clear.   Eyes:      Extraocular Movements: Extraocular movements intact.      Conjunctiva/sclera: Conjunctivae normal.      Pupils: Pupils are equal, round, and reactive to light.   Cardiovascular:      Rate and Rhythm: Regular rhythm. Bradycardia present.      Pulses: Normal pulses.      Heart sounds: Normal heart sounds. No murmur heard.  Pulmonary:      Effort: Pulmonary effort is normal.      Comments: Bibasilar crackles  Abdominal:      General: Abdomen is flat. Bowel sounds are normal. There is no distension.      Palpations: Abdomen is  soft.      Tenderness: There is no abdominal tenderness.   Musculoskeletal:         General: Normal range of motion.      Cervical back: Normal range of motion and neck supple.      Right lower leg: Edema present.      Left lower leg: Edema present.   Skin:     General: Skin is warm and dry.      Capillary Refill: Capillary refill takes less than 2 seconds.   Neurological:      General: No focal deficit present.      Mental Status: She is alert and oriented to person, place, and time. Mental status is at baseline.      Cranial Nerves: No cranial nerve deficit.      Sensory: No sensory deficit.      Motor: No weakness.   Psychiatric:         Mood and Affect: Mood normal.         Behavior: Behavior normal.         Thought Content: Thought content normal.         Judgment: Judgment normal.     Review of Systems    Vents:  Oxygen Concentration (%): 40 (03/28/23 0055)    Lines/Drains/Airways       Drain  Duration                  Suprapubic Catheter 12/13/22 100% silicone 20 Fr. 105 days              Line  Duration                  Subcutaneous Infusion Pump Abdomen (Comment) -- days              Peripheral Intravenous Line  Duration                  Peripheral IV - Single Lumen 03/28/23 0450 Distal;Posterior;Right Forearm <1 day                    Significant Labs:    CBC/Anemia Profile:  No results for input(s): WBC, HGB, HCT, PLT, MCV, RDW, IRON, FERRITIN, RETIC, FOLATE, [id number], OCCULTBLOOD in the last 48 hours.     Chemistries:  No results for input(s): NA, K, CL, CO2, BUN, CREATININE, CALCIUM, ALBUMIN, PROT, BILITOT, ALKPHOS, ALT, AST, GLUCOSE, MG, PHOS in the last 48 hours.    All pertinent labs within the past 24 hours have been reviewed.    Significant Imaging:  I have reviewed all pertinent imaging results/findings within the past 24 hours.    Assessment/Plan:     Pulmonary  * Acute respiratory failure with hypoxia and hypercarbia  - Pt with >1 year of worsening dyspnea & SOB with rest and activity.  Significant orthopnea with activity  - Clinically appears comfortable on exam, however the episodes are worse with laying down, activity.   - Never smoker, however no PFT in our system to evaluate lung function, volumes, or diffusing capacity  - A-a gradient normal based on ABG  - Normal EF on last ECHO, some mild TR/MR/LAE. Diastolic dysfunction  - Suspect her symptoms are multifactorial in nature: morbid obesity, edema, hypothyroidism, AI, underlying alveolar hypoventilation  - bedside ultrasound with some B lines, diaphragm moves normally on both sides    Recommendations  - Continued diuresis as tolerated; would anticipate discharge with a daily oral regimen with additional PRN  - Continue to work on her hypothyroidism regimen  - Continue PPI daily for GERD and reflux; has issues with aspiration. Needs follow-up with GI at [facility] (she has attempted to schedule it multiple times herself without avail)  - Ordering NIV for daytime & nighttime use to decrease the risk of exacerbation. Home Bipap will not be sufficient due to the severity of her illness. Providing volume ventilation will help decrease CO2 levels & readmission changes  - Outpatient sleep study & PFTs  - Would perform a walk test for home oxygen prior to discharge    Endocrine  Primary hypothyroidism  - TSH 40.194, T3 undetectable, normal range T4  - suspect this is playing a role in her overall clinical picture (bradycardia, hypotension, weight gain, & sleep disordered breathing)  - Endocrinology following; appreciate their assistance  - Continue synthryoid    GI  GERD (gastroesophageal reflux disease)  - Continue PPI daily    Other  Debility  - PT/OT as able  - Encouraged OOB and into a chair as much as possible    Sleep apnea  - Continue NIV at night  - Needs outpatient sleep study      Thank you for allowing us to participate in the care of this patient, we will sign off at this time. Please let us know if there are questions or concerns.    Franco PHELPS  MD Abr[place]  Pulmonology  [facility]    Pt seen and examined with Pulmonary/Critical Care team and this note reviewed and validated with the following additional comments:    Gold. Has received more synthroid. Not much more to add now.    Harpal Salcido MD  Phone [phone]

## 2023-03-28 NOTE — CONSULTS
Consult completed - see note from same writer on same date.    Robert Stein MD  Hospice and Palliative Medicine  Palliative Care Pager: [phone]

## 2023-03-28 NOTE — ASSESSMENT & PLAN NOTE
Patient with Hypoxic Respiratory failure which is Acute.  she is not on home oxygen. Supplemental oxygen was provided and noted- Oxygen Concentration (%):  [40] 40.   Signs/symptoms of respiratory failure include- shortness of breath.     On 2 L oxygen via NC saturating 96%.  Suspect dyspnea and hypoxia multifactorial in setting of atelectasis, ?Mild volume overload (BNP maybe falsely low in obesity)    CTA chest ruled out PE, no large focal consolidation noted.  Procalcitonin negative less suggestive of pneumonia.  CT chest showed emphysema.  Not on home inhalers.  Suspect obstructive airway disease.  DuoNebs on back order.  Add Spiriva for now.  P.r.n. albuterol.  ABG showed pCO2 64, based on pH this appears to be chronic.  Per discussion with Pulmonary fellow, she would meet criteria for outpatient BiPAP use.      Incentive spirometer.  Increase mobility. Suspect subjective dyspnea may be in setting of volume overload +/- deconditioning or a systemic problem.  Patient reported lightheadedness while working with physical therapy which could be in setting of orthostasis as she was on suboptimal steroid dosing for adrenal insufficiency.  On chart review, discharged 01/2023 on total hydrocortisone 15 mg q.d..  Fludrocortisone added.  Also noted on chart review, last TSH in 01/2023 was 51.  Repeat TSH elevated to 41 but free T4 within normal limits.    s/p IV diuresis, continue. Incentive spirometer, PT, encourage mobilization.  BiPAP q.h.s.    Pulmonary consult given patient has persistent subjective dyspnea  - multifactorial due to chronic aspiration, volume overload, hypothyroidism

## 2023-03-28 NOTE — PROGRESS NOTES
Endocrine review.  The adrenal tests :ACTH and cortisol are normal in spite of the fact she is on steroid replacement therapy,Her thyroid medication was adjusted , the hypokalemia could be due to lasix and steroid therapy, the patient remains hypotensive.

## 2023-03-28 NOTE — PT/OT/SLP PROGRESS
Physical Therapy Treatment    Patient Name:  Tasha Hawley   MRN:  [id number]    Recommendations:     Discharge Recommendations: home health PT (24/7 supervision)  Discharge Equipment Recommendations: none  Barriers to discharge:  decreased mobility,strength and endurance    Assessment:     Tasha Hawley is a 67 y.o. female admitted with a medical diagnosis of Acute respiratory failure with hypoxia and hypercarbia.  She presents with the following impairments/functional limitations: weakness, impaired endurance, impaired functional mobility, gait instability, impaired balance, decreased upper extremity function, decreased lower extremity function, pain, decreased ROM, impaired coordination, impaired skin, impaired cardiopulmonary response to activity,pt with increased gait and remains with flexed posture,pt will benefit from  services to address above functional limitations.    Rehab Prognosis: Fair; patient would benefit from acute skilled PT services to address these deficits and reach maximum level of function.    Recent Surgery: * No surgery found *      Plan:     During this hospitalization, patient to be seen 3 x/week to address the identified rehab impairments via gait training, therapeutic activities, therapeutic exercises, neuromuscular re-education and progress toward the following goals:    Plan of Care Expires:  04/27/23    Subjective     Chief Complaint: n/a  Patient/Family Comments/goals: pt agreeable to up[ in chair.  Pain/Comfort:  Pain Rating 1:  (no rating)  Location - Orientation 1: generalized  Location 1: back  Pain Addressed 1: Reposition, Distraction      Objective:     Communicated with nsg prior to session.  Patient found supine with meadows catheter, oxygen, peripheral IV, telemetry upon PT entry to room.     General Precautions: Standard, fall  Orthopedic Precautions: N/A  Braces: N/A  Respiratory Status: Nasal cannula, flow 2 L/min     Functional Mobility:  Bed Mobility:     Supine  to Sit: supervision  Transfers:     Sit to Stand:  stand by assistance with rolling walker  Bed to Chair: contact guard assistance with  rolling walker  using  ambulation  Gait: amb ~30' with RW and CGA with O2 donned and flexed posture  Balance: fair standing balance with RW      AM-PAC 6 CLICK MOBILITY  Turning over in bed (including adjusting bedclothes, sheets and blankets)?: 4  Sitting down on and standing up from a chair with arms (e.g., wheelchair, bedside commode, etc.): 3  Moving from lying on back to sitting on the side of the bed?: 3  Moving to and from a bed to a chair (including a wheelchair)?: 3  Need to walk in hospital room?: 3  Climbing 3-5 steps with a railing?: 1  Basic Mobility Total Score: 17       Treatment & Education: le seated ex's within tolerance,rest periods throughout rx and increased time required.      Patient left up in chair with all lines intact, call button in reach, and nsg notified..    GOALS: see general POC  Multidisciplinary Problems       Physical Therapy Goals          Problem: Physical Therapy    Goal Priority Disciplines Outcome Goal Variances Interventions   Physical Therapy Goal     PT, PT/OT Ongoing, Progressing     Description: Goals to be met by:  23    Patient will increase functional independence with mobility by performin. Supine to sit with Modified Rowan  2. Sit to supine with Modified Rowan  3. Sit to stand transfer with Supervision  4. Bed to chair transfer with Supervision using Rolling Walker  5. Gait  x 100 feet with Supervision using Rolling Walker.                          Time Tracking:     PT Received On: 23  PT Start Time: 08     PT Stop Time: 0900  PT Total Time (min): 38 min     Billable Minutes: Gait Training 16, Therapeutic Activity 12, and Therapeutic Exercise 10    Treatment Type: Treatment  PT/PTA: PTA     Number of PTA visits since last PT visit: 2023

## 2023-03-28 NOTE — PLAN OF CARE
Problem: Physical Therapy  Goal: Physical Therapy Goal  Description: Goals to be met by:  23    Patient will increase functional independence with mobility by performin. Supine to sit with Modified [place]  2. Sit to supine with Modified [place]  3. Sit to stand transfer with Supervision  4. Bed to chair transfer with Supervision using Rolling Walker  5. Gait  x 100 feet with Supervision using Rolling Walker.     Outcome: Ongoing, Progressing

## 2023-03-28 NOTE — SUBJECTIVE & OBJECTIVE
Interval History: No events overnight. Still dyspneic with activity with PT/OT today. Tolerating a diet, still having reflux symptoms ([place] [facility] [facility]     Objective:     Vital Signs (Most Recent):  Temp: 97.8 °F (36.6 °C) (03/28/23 1132)  Pulse: (!) 53 (03/28/23 1132)  Resp: 16 (03/28/23 1221)  BP: (!) 98/52 (03/28/23 1132)  SpO2: 99 % (03/28/23 1132)   Vital Signs (24h Range):  Temp:  [97.5 °F (36.4 °C)-99.8 °F (37.7 °C)] 97.8 °F (36.6 °C)  Pulse:  [49-60] 53  Resp:  [16-20] 16  SpO2:  [90 %-99 %] 99 %  BP: ()/(50-65) 98/52     Weight: 100.4 kg (221 lb 5.5 oz)  Body mass index is 34.67 kg/m².      Intake/Output Summary (Last 24 hours) at 3/28/2023 1242  Last data filed at 3/28/2023 0945  Gross per 24 hour   Intake 540 ml   Output 2875 ml   Net -2335 ml       Physical Exam  Constitutional:       Appearance: She is obese.   HENT:      Head: Normocephalic and atraumatic.      Nose: Nose normal. No congestion.      Mouth/Throat:      Mouth: Mucous membranes are moist.      Pharynx: Oropharynx is clear.   Eyes:      Extraocular Movements: Extraocular movements intact.      Conjunctiva/sclera: Conjunctivae normal.      Pupils: Pupils are equal, round, and reactive to light.   Cardiovascular:      Rate and Rhythm: Regular rhythm. Bradycardia present.      Pulses: Normal pulses.      Heart sounds: Normal heart sounds. No murmur heard.  Pulmonary:      Effort: Pulmonary effort is normal.      Comments: Bibasilar crackles  Abdominal:      General: Abdomen is flat. Bowel sounds are normal. There is no distension.      Palpations: Abdomen is soft.      Tenderness: There is no abdominal tenderness.   Musculoskeletal:         General: Normal range of motion.      Cervical back: Normal range of motion and neck supple.      Right lower leg: Edema present.      Left lower leg: Edema present.   Skin:     General: Skin is warm and dry.      Capillary Refill: Capillary refill takes less than 2 seconds.    Neurological:      General: No focal deficit present.      Mental Status: She is alert and oriented to person, place, and time. Mental status is at baseline.      Cranial Nerves: No cranial nerve deficit.      Sensory: No sensory deficit.      Motor: No weakness.   Psychiatric:         Mood and Affect: Mood normal.         Behavior: Behavior normal.         Thought Content: Thought content normal.         Judgment: Judgment normal.     Review of Systems    Vents:  Oxygen Concentration (%): 40 (03/28/23 0055)    Lines/Drains/Airways       Drain  Duration                  Suprapubic Catheter 12/13/22 100% silicone 20 Fr. 105 days              Line  Duration                  Subcutaneous Infusion Pump Abdomen (Comment) -- days              Peripheral Intravenous Line  Duration                  Peripheral IV - Single Lumen 03/28/23 0450 Distal;Posterior;Right Forearm <1 day                    Significant Labs:    CBC/Anemia Profile:  No results for input(s): WBC, HGB, HCT, PLT, MCV, RDW, IRON, FERRITIN, RETIC, FOLATE, [id number], OCCULTBLOOD in the last 48 hours.     Chemistries:  No results for input(s): NA, K, CL, CO2, BUN, CREATININE, CALCIUM, ALBUMIN, PROT, BILITOT, ALKPHOS, ALT, AST, GLUCOSE, MG, PHOS in the last 48 hours.    All pertinent labs within the past 24 hours have been reviewed.    Significant Imaging:  I have reviewed all pertinent imaging results/findings within the past 24 hours.

## 2023-03-28 NOTE — PLAN OF CARE
Patient is awake and alert.  Has complaints of pain and pain medications given per MAR.  Continues to receive antibiotics.  Suprapubic catheter is intact and is draining clear yellow urine.  Bilateral lower extremities are edematous.   On Lasix.  Safety is maintained with bed low, wheels locked and side rails up.  Call light within reach.  Will continue to monitor.

## 2023-03-28 NOTE — ASSESSMENT & PLAN NOTE
Given extreme fatigue, hypotension-consulted endocrinology for adrenal insufficiency as patient has not had a formal evaluation by endocrinology as for now, will continue hydrocortisone as recommended on last discharge summary

## 2023-03-28 NOTE — ASSESSMENT & PLAN NOTE
With intrathecal Dilaudid pump in place   Continue home dose p.r.n. Norco, Tylenol    Per patient back pain is chronic due to multiple past surgeries but patient reported worsening pain while working with PT and history of falls    Thoracolumbar spine x-ray shows no acute fracture or displacement. Okay to work with PT/OT    Palliative care consult for chronic pain and overall goals of care  - added pregabalin and increased norco

## 2023-03-28 NOTE — PT/OT/SLP PROGRESS
Occupational Therapy   Treatment    Name: Tasha Hawley  MRN: [id number]  Admitting Diagnosis:  Acute respiratory failure with hypoxia and hypercarbia       Recommendations:     Discharge Recommendations: home, home health PT, home health OT (assist from spouse as needed)  Discharge Equipment Recommendations:  none  Barriers to discharge:  None    Assessment:     Tasha Hawley is a 67 y.o. female with a medical diagnosis of Acute respiratory failure with hypoxia and hypercarbia.  Performance deficits affecting function are weakness, impaired endurance, impaired self care skills, impaired functional mobility, gait instability, impaired balance, decreased lower extremity function, pain, decreased ROM, impaired coordination, impaired skin, edema, impaired cardiopulmonary response to activity. Pt found in chair, agreeable to therapy.  Pt is progressing daily towards goals. Continue OT services to address functional goals, progressing as able.       Rehab Prognosis:  Good; patient would benefit from acute skilled OT services to address these deficits and reach maximum level of function.       Plan:     Patient to be seen 4 x/week to address the above listed problems via self-care/home management, therapeutic activities, therapeutic exercises  Plan of Care Expires: 04/21/23  Plan of Care Reviewed with: patient    Subjective     Pain/Comfort:  Pain Rating 1: 8/10  Location - Side 1: Bilateral  Location - Orientation 1: generalized  Location 1: leg  Pain Addressed 1: Reposition, Distraction, Cessation of Activity  Pain Rating Post-Intervention 1: 9/10    Objective:     Communicated with: RN prior to session.  Patient found up in chair with meadows catheter, oxygen, peripheral IV, telemetry upon OT entry to room.    General Precautions: Standard, fall    Orthopedic Precautions:N/A  Braces: N/A  Respiratory Status: Nasal cannula     Occupational Performance:     Functional Mobility/Transfers:  Patient completed Sit <>  Stand Transfer with supervision  with  rolling walker   Patient completed Bed <> Chair Transfer using Stand Pivot technique with supervision with rolling walker  Functional Mobility: Pt ambulated room distances with SBA using RW. Pt flexed at trunk unable stand fully upright 2/2 chronic back pain per pt report.     Activities of Daily Living:  Declined      AMPA 6 Click ADL: 17    Treatment & Education:  Pt performed BUE AROM ex 2 x 10 reps all jts/planes.  Pt tolerated fairly well with rest breaks 2/2 mild SOB and muscle fatigue.     Patient left up in chair with all lines intact, call button in reach, and RN notified    GOALS:   Multidisciplinary Problems       Occupational Therapy Goals          Problem: Occupational Therapy    Goal Priority Disciplines Outcome Interventions   Occupational Therapy Goal     OT, PT/OT Ongoing, Progressing    Description: Goals to be met by: 4/21/2023     Patient will increase functional independence with ADLs by performing:    UE Dressing with Set-up Assistance with overhead clothing without complaints of increased lumbar pain  LE Dressing with Minimal Assistance to return to PLOF with incorporation of improved body mechanics (ie. Avoid trunk twisting 2/2 hx of back pain)  Toileting from toilet with Stand-by Assistance for hygiene and clothing management after bowel movement.   Toilet transfer to toilet with Stand-by Assistance with incorporation of body alignment principles.  Standing x 3 minutes with BUE support as needed, SBA for balance, and without adverse change in vital signs or reports of dizziness  Functional mobility in room to access bathroom / closet area with SBA, use of least restrictive assistive device, and incorporated energy conservation techniques.                         Time Tracking:     OT Date of Treatment: 03/28/23  OT Start Time: 1132  OT Stop Time: 1155  OT Total Time (min): 23 min    Billable Minutes:Therapeutic Activity 13  Therapeutic Exercise 10                3/28/2023

## 2023-03-28 NOTE — TELEPHONE ENCOUNTER
----- Message from Kristen Bray LPN sent at 3/27/2023  4:19 PM CDT -----  Regarding: FW: Refill  Contact: [phone]    ----- Message -----  From: Cora Garcia  Sent: 3/27/2023  12:19 PM CDT  To: [facility] Staff  Subject: Refill                                           [facility] is calling to request a rx mirabegron (MYRBETRIQ) 50 mg Tb24, states it was sent on 03/21/23 and they haven't heard anything.  Please call.

## 2023-03-28 NOTE — ASSESSMENT & PLAN NOTE
Last TSH 51.8 in 01/2023, TSH remains elevated at 41 but free T4 normal    Increased synthroid to 150mcg; added cytomel 25mg qd

## 2023-03-28 NOTE — SUBJECTIVE & OBJECTIVE
Interval History:  Doing reasonably well today.  Still having some shortness of breath and states that her legs are still swollen, although this is improving.  Cellulitis appears to be improving; anticipate switch to oral regimen tomorrow.  Discussed with pulmonology and would benefit from noninvasive ventilation at time of discharge.  Suspect that her symptoms are multifactorial.  Also discussed with palliative care to assist with pain control regimen as she has had significant difficulty in managing her chronic pain over multiple hospitalizations.    Review of Systems   Constitutional:  Positive for fatigue.   Respiratory:  Positive for shortness of breath.    Cardiovascular:  Positive for leg swelling.   Neurological:  Negative for light-headedness.   Objective:     Vital Signs (Most Recent):  Temp: 97.3 °F (36.3 °C) (03/28/23 1544)  Pulse: (!) 49 (03/28/23 1609)  Resp: 19 (03/28/23 1544)  BP: (!) 103/52 (03/28/23 1544)  SpO2: 99 % (03/28/23 1544)   Vital Signs (24h Range):  Temp:  [97.3 °F (36.3 °C)-99.8 °F (37.7 °C)] 97.3 °F (36.3 °C)  Pulse:  [49-60] 49  Resp:  [16-20] 19  SpO2:  [90 %-99 %] 99 %  BP: ()/(50-65) 103/52     Weight: 100.4 kg (221 lb 5.5 oz)  Body mass index is 34.67 kg/m².    Intake/Output Summary (Last 24 hours) at 3/28/2023 1810  Last data filed at 3/28/2023 0945  Gross per 24 hour   Intake 180 ml   Output 1500 ml   Net -1320 ml        Physical Exam  Vitals and nursing note reviewed.   Constitutional:       General: She is not in acute distress.     Appearance: She is ill-appearing.   Eyes:      General: No scleral icterus.  Cardiovascular:      Rate and Rhythm: Regular rhythm. Bradycardia present.      Pulses: Normal pulses.      Heart sounds: Normal heart sounds. No murmur heard.  Pulmonary:      Effort: Pulmonary effort is normal. No respiratory distress.      Breath sounds: No wheezing.   Abdominal:      Palpations: Abdomen is soft.      Tenderness: There is no abdominal tenderness.    Musculoskeletal:      Right lower leg: Edema present.      Left lower leg: Edema present.      Comments: Bilateral lower extremity erythema and tenderness on palpation, no palpable abscess or open wound  Lower extremity edema, redness improved   Skin:     General: Skin is warm and dry.      Findings: No bruising.   Neurological:      Mental Status: She is alert and oriented to person, place, and time.       Significant Labs: All pertinent labs within the past 24 hours have been reviewed.    Significant Imaging: I have reviewed all pertinent imaging results/findings within the past 24 hours.

## 2023-03-28 NOTE — TELEPHONE ENCOUNTER
----- Message from Kristen Bray LPN sent at 3/27/2023  5:24 PM CDT -----  Regarding: FW: Appt  Contact: pt [phone]    ----- Message -----  From: Charlie Estes  Sent: 3/27/2023   4:25 PM CDT  To: [facility] Staff  Subject: Appt                                             Pt is calling to inform provider that she is IP at [facility] will not be able to have procedure. Please call

## 2023-03-28 NOTE — PLAN OF CARE
Problem: Occupational Therapy  Goal: Occupational Therapy Goal  Description: Goals to be met by: 4/21/2023     Patient will increase functional independence with ADLs by performing:    UE Dressing with Set-up Assistance with overhead clothing without complaints of increased lumbar pain  LE Dressing with Minimal Assistance to return to PLOF with incorporation of improved body mechanics (ie. Avoid trunk twisting 2/2 hx of back pain)  Toileting from toilet with Stand-by Assistance for hygiene and clothing management after bowel movement.   Toilet transfer to toilet with Stand-by Assistance with incorporation of body alignment principles.  Standing x 3 minutes with BUE support as needed, SBA for balance, and without adverse change in vital signs or reports of dizziness  Functional mobility in room to access bathroom / closet area with SBA, use of least restrictive assistive device, and incorporated energy conservation techniques.    Outcome: Ongoing, Progressing   Tasha Hawley is a 67 y.o. female with a medical diagnosis of Acute respiratory failure with hypoxia and hypercarbia.  Performance deficits affecting function are weakness, impaired endurance, impaired self care skills, impaired functional mobility, gait instability, impaired balance, decreased lower extremity function, pain, decreased ROM, impaired coordination, impaired skin, edema, impaired cardiopulmonary response to activity. Pt found in chair, agreeable to therapy.  Pt is progressing daily towards goals. Continue OT services to address functional goals, progressing as able.

## 2023-03-29 ENCOUNTER — TELEPHONE (OUTPATIENT)
Dept: PULMONOLOGY | Facility: CLINIC | Age: 68
End: 2023-03-29
Payer: MEDICARE

## 2023-03-29 PROBLEM — Z51.5 PALLIATIVE CARE ENCOUNTER: Status: ACTIVE | Noted: 2023-03-29

## 2023-03-29 LAB
ANION GAP SERPL CALC-SCNC: 11 MMOL/L (ref 8–16)
BUN SERPL-MCNC: 11 MG/DL (ref 8–23)
CALCIUM SERPL-MCNC: 8.8 MG/DL (ref 8.7–10.5)
CHLORIDE SERPL-SCNC: 99 MMOL/L (ref 95–110)
CO2 SERPL-SCNC: 31 MMOL/L (ref 23–29)
CREAT SERPL-MCNC: 1 MG/DL (ref 0.5–1.4)
EST. GFR  (NO RACE VARIABLE): >60 ML/MIN/1.73 M^2
GLUCOSE SERPL-MCNC: 117 MG/DL (ref 70–110)
POTASSIUM SERPL-SCNC: 4.2 MMOL/L (ref 3.5–5.1)
SODIUM SERPL-SCNC: 141 MMOL/L (ref 136–145)

## 2023-03-29 PROCEDURE — 11000001 HC ACUTE MED/SURG PRIVATE ROOM: Mod: HCNC

## 2023-03-29 PROCEDURE — 25000003 PHARM REV CODE 250: Mod: HCNC | Performed by: NURSE PRACTITIONER

## 2023-03-29 PROCEDURE — 63600175 PHARM REV CODE 636 W HCPCS: Mod: HCNC | Performed by: HOSPITALIST

## 2023-03-29 PROCEDURE — 99233 SBSQ HOSP IP/OBS HIGH 50: CPT | Mod: HCNC,,,

## 2023-03-29 PROCEDURE — 25000003 PHARM REV CODE 250: Mod: HCNC | Performed by: STUDENT IN AN ORGANIZED HEALTH CARE EDUCATION/TRAINING PROGRAM

## 2023-03-29 PROCEDURE — 63600175 PHARM REV CODE 636 W HCPCS: Mod: HCNC | Performed by: REGISTERED NURSE

## 2023-03-29 PROCEDURE — 97530 THERAPEUTIC ACTIVITIES: CPT | Mod: HCNC,CO

## 2023-03-29 PROCEDURE — 27000221 HC OXYGEN, UP TO 24 HOURS: Mod: HCNC

## 2023-03-29 PROCEDURE — 99233 PR SUBSEQUENT HOSPITAL CARE,LEVL III: ICD-10-PCS | Mod: HCNC,,,

## 2023-03-29 PROCEDURE — 94640 AIRWAY INHALATION TREATMENT: CPT | Mod: HCNC

## 2023-03-29 PROCEDURE — 25000003 PHARM REV CODE 250: Mod: HCNC | Performed by: HOSPITALIST

## 2023-03-29 PROCEDURE — 99900035 HC TECH TIME PER 15 MIN (STAT): Mod: HCNC

## 2023-03-29 PROCEDURE — 80048 BASIC METABOLIC PNL TOTAL CA: CPT | Mod: HCNC | Performed by: HOSPITALIST

## 2023-03-29 PROCEDURE — 94660 CPAP INITIATION&MGMT: CPT | Mod: HCNC

## 2023-03-29 PROCEDURE — 25000003 PHARM REV CODE 250: Mod: HCNC | Performed by: REGISTERED NURSE

## 2023-03-29 PROCEDURE — 94799 UNLISTED PULMONARY SVC/PX: CPT | Mod: HCNC

## 2023-03-29 PROCEDURE — 25000003 PHARM REV CODE 250: Mod: HCNC

## 2023-03-29 PROCEDURE — 36415 COLL VENOUS BLD VENIPUNCTURE: CPT | Mod: HCNC | Performed by: HOSPITALIST

## 2023-03-29 PROCEDURE — 25000003 PHARM REV CODE 250: Mod: HCNC | Performed by: INTERNAL MEDICINE

## 2023-03-29 PROCEDURE — 63600175 PHARM REV CODE 636 W HCPCS: Mod: HCNC | Performed by: STUDENT IN AN ORGANIZED HEALTH CARE EDUCATION/TRAINING PROGRAM

## 2023-03-29 RX ORDER — HYDROCODONE BITARTRATE AND ACETAMINOPHEN 10; 325 MG/1; MG/1
1 TABLET ORAL EVERY 6 HOURS PRN
Status: DISCONTINUED | OUTPATIENT
Start: 2023-03-29 | End: 2023-04-03 | Stop reason: HOSPADM

## 2023-03-29 RX ORDER — CEPHALEXIN 500 MG/1
500 CAPSULE ORAL EVERY 8 HOURS
Status: DISCONTINUED | OUTPATIENT
Start: 2023-03-29 | End: 2023-04-03 | Stop reason: HOSPADM

## 2023-03-29 RX ORDER — HYDROXYZINE HYDROCHLORIDE 25 MG/1
25 TABLET, FILM COATED ORAL 3 TIMES DAILY PRN
Status: DISCONTINUED | OUTPATIENT
Start: 2023-03-29 | End: 2023-04-03 | Stop reason: HOSPADM

## 2023-03-29 RX ORDER — FUROSEMIDE 10 MG/ML
20 INJECTION INTRAMUSCULAR; INTRAVENOUS ONCE
Status: COMPLETED | OUTPATIENT
Start: 2023-03-29 | End: 2023-03-29

## 2023-03-29 RX ORDER — FUROSEMIDE 10 MG/ML
40 INJECTION INTRAMUSCULAR; INTRAVENOUS 2 TIMES DAILY
Status: DISCONTINUED | OUTPATIENT
Start: 2023-03-29 | End: 2023-04-03 | Stop reason: HOSPADM

## 2023-03-29 RX ORDER — HYDROCODONE BITARTRATE AND ACETAMINOPHEN 10; 325 MG/1; MG/1
1 TABLET ORAL EVERY 6 HOURS PRN
Status: DISCONTINUED | OUTPATIENT
Start: 2023-03-29 | End: 2023-03-29

## 2023-03-29 RX ADMIN — CEFAZOLIN SODIUM 1 G: 1 SOLUTION INTRAVENOUS at 03:03

## 2023-03-29 RX ADMIN — CEPHALEXIN 500 MG: 500 CAPSULE ORAL at 02:03

## 2023-03-29 RX ADMIN — HYDROCODONE BITARTRATE AND ACETAMINOPHEN 1 TABLET: 10; 325 TABLET ORAL at 09:03

## 2023-03-29 RX ADMIN — ENOXAPARIN SODIUM 40 MG: 40 INJECTION SUBCUTANEOUS at 05:03

## 2023-03-29 RX ADMIN — ATORVASTATIN CALCIUM 80 MG: 40 TABLET, FILM COATED ORAL at 08:03

## 2023-03-29 RX ADMIN — QUETIAPINE FUMARATE 200 MG: 100 TABLET ORAL at 09:03

## 2023-03-29 RX ADMIN — FUROSEMIDE 40 MG: 10 INJECTION INTRAMUSCULAR; INTRAVENOUS at 05:03

## 2023-03-29 RX ADMIN — PREGABALIN 50 MG: 50 CAPSULE ORAL at 09:03

## 2023-03-29 RX ADMIN — MUPIROCIN: 20 OINTMENT TOPICAL at 08:03

## 2023-03-29 RX ADMIN — TRAZODONE HYDROCHLORIDE 50 MG: 50 TABLET ORAL at 09:03

## 2023-03-29 RX ADMIN — FUROSEMIDE 20 MG: 10 INJECTION, SOLUTION INTRAMUSCULAR; INTRAVENOUS at 12:03

## 2023-03-29 RX ADMIN — PREGABALIN 50 MG: 50 CAPSULE ORAL at 08:03

## 2023-03-29 RX ADMIN — HYDROCORTISONE 10 MG: 5 TABLET ORAL at 08:03

## 2023-03-29 RX ADMIN — LIOTHYRONINE SODIUM 25 MCG: 25 TABLET ORAL at 08:03

## 2023-03-29 RX ADMIN — TIOTROPIUM BROMIDE INHALATION SPRAY 2 PUFF: 3.12 SPRAY, METERED RESPIRATORY (INHALATION) at 08:03

## 2023-03-29 RX ADMIN — CEPHALEXIN 500 MG: 500 CAPSULE ORAL at 09:03

## 2023-03-29 RX ADMIN — FUROSEMIDE 20 MG: 10 INJECTION, SOLUTION INTRAMUSCULAR; INTRAVENOUS at 05:03

## 2023-03-29 RX ADMIN — PANTOPRAZOLE SODIUM 40 MG: 40 TABLET, DELAYED RELEASE ORAL at 08:03

## 2023-03-29 RX ADMIN — HYDROCODONE BITARTRATE AND ACETAMINOPHEN 1 TABLET: 10; 325 TABLET ORAL at 02:03

## 2023-03-29 RX ADMIN — HYDROCORTISONE 5 MG: 5 TABLET ORAL at 05:03

## 2023-03-29 RX ADMIN — POTASSIUM BICARBONATE 20 MEQ: 391 TABLET, EFFERVESCENT ORAL at 09:03

## 2023-03-29 RX ADMIN — FLUDROCORTISONE ACETATE 100 MCG: 0.1 TABLET ORAL at 08:03

## 2023-03-29 RX ADMIN — STANDARDIZED SENNA CONCENTRATE 8.6 MG: 8.6 TABLET ORAL at 09:03

## 2023-03-29 RX ADMIN — FLUOXETINE HYDROCHLORIDE 60 MG: 20 CAPSULE ORAL at 08:03

## 2023-03-29 RX ADMIN — ASPIRIN 81 MG: 81 TABLET, COATED ORAL at 08:03

## 2023-03-29 RX ADMIN — HYDROCODONE BITARTRATE AND ACETAMINOPHEN 1 TABLET: 7.5; 325 TABLET ORAL at 03:03

## 2023-03-29 RX ADMIN — LEVOTHYROXINE SODIUM 150 MCG: 25 TABLET ORAL at 05:03

## 2023-03-29 NOTE — SUBJECTIVE & OBJECTIVE
Interval History: no acute events reported overnight,  Pt worked with OT today,     Medications:  Continuous Infusions:  Scheduled Meds:   aspirin  81 mg Oral Daily    atorvastatin  80 mg Oral Daily    cephALEXin  500 mg Oral Q8H    enoxaparin  40 mg Subcutaneous Daily    fludrocortisone  100 mcg Oral Daily    FLUoxetine  60 mg Oral Daily    furosemide (LASIX) injection  40 mg Intravenous BID    hydrocortisone  10 mg Oral Daily    hydrocortisone  5 mg Oral Daily PM    levothyroxine  150 mcg Oral Before breakfast    liothyronine  25 mcg Oral Daily    pantoprazole  40 mg Oral Daily    potassium bicarbonate  20 mEq Oral BID    potassium bicarbonate  40 mEq Oral Once    pregabalin  50 mg Oral BID    QUEtiapine  200 mg Oral QHS    tiotropium bromide  2 puff Inhalation Daily    traZODone  50 mg Oral QHS     PRN Meds:aluminum-magnesium hydroxide-simethicone, butalbital-acetaminophen-caffeine -40 mg, HYDROcodone-acetaminophen, polyethylene glycol, senna    Objective:     Vital Signs (Most Recent):  Temp: 96.8 °F (36 °C) (03/29/23 1143)  Pulse: (!) 53 (03/29/23 1232)  Resp: 19 (03/29/23 1143)  BP: (!) 101/52 (03/29/23 1143)  SpO2: 98 % (03/29/23 1220)   Vital Signs (24h Range):  Temp:  [96.3 °F (35.7 °C)-98.7 °F (37.1 °C)] 96.8 °F (36 °C)  Pulse:  [49-62] 53  Resp:  [18-20] 19  SpO2:  [95 %-99 %] 98 %  BP: ()/(46-62) 101/52     Weight: 100.4 kg (221 lb 5.5 oz)  Body mass index is 34.67 kg/m².  Physical Exam  Constitutional:       General: She is not in acute distress.     Appearance: She is obese. She is not toxic-appearing. Chronically ill   HENT:      Head: Normocephalic and atraumatic.   Mouth: Mucous membranes are moist.       Comments: edentulous  Eyes:      Extraocular Movements: Extraocular movements intact.      Comments: Pupils pinpoint   Cardiovascular:      Rate and Rhythm: Regular rhythm. Bradycardia present.      Pulses: Normal pulses.      Heart sounds: Normal heart sounds. No murmur heard.     Pulmonary:      Effort: Pulmonary effort is normal. No respiratory distress.      Breath sounds: decreased breath sounds. Abdominal:      General: Abdomen is flat. Bowel sounds are decreased. There is no distension.      Palpations: Abdomen is soft.      Tenderness: There is no abdominal tenderness.   Musculoskeletal:      Right lower leg: Edema present.      Left lower leg: Edema present.      Comments: nonpitting   Skin:     Coloration: Skin is pale and sallow.   Neurological:      Mental Status: She is alert and oriented to person, place, and time. Mental status is at baseline.      Motor: Weakness (flaccid paraplegia of LLE; RLE 3/5 strength) present.      Comments: Allodynia of bilateral legs R>>L     Review of Symptoms        Symptom Assessment (ESAS 0-10 Scale)  Pain:  6  Dyspnea:  0  Anxiety:  0  Nausea:  0  Depression:  0  Anorexia:  0  Fatigue:  0  Insomnia:  0  Restlessness:  0  Agitation:  0      CAM / Delirium:  Negative  Constipation:  Positive  Diarrhea:  Negative        ECOG Performance Status rdGrdrrdarddrderd:rd rd3rd Living Arrangements:  Lives with family and Lives in home     Psychosocial/Cultural:   See Palliative Psychosocial Note: No  Social Issues Identified: no  Bereavement Risk: non  Caregiver Needs Discussed. Caregiver Distress: no  Cultural: none  **Primary  to Follow**  Palliative Care  Consult: No     Spiritual:  F - Mindi and Belief:  Baptism  I - Importance:  Low  C - Community:  None active  A - Address in Care:  [facility]     Time-Based Charting:  Yes  Chart Review: 20 minutes  Face to Face: 11 minutes  Symptom Assessment: 9 minutes  Coordination of Care: 5 minutes  Discharge Plannin minutes     Total Time Spent: 50 minutes    Advance Care Planning   Advance Directives:   Goals of Care: What is most important right now is to focus on symptom/pain control, extending life as long as possible, even it it means sacrificing quality. Accordingly, we have decided that  the best plan to meet the patient's goals includes continuing with treatment.       Significant Labs: All pertinent labs within the past 24 hours have been reviewed.  CBC:   Recent Labs   Lab 03/22/23  1946 03/24/23  1340   WBC 5.32  --    HGB 10.3*  --    HCT 33.1* 32*   MCV 84  --      --      BMP:  Recent Labs   Lab 03/29/23  0435   *      K 4.2   CL 99   CO2 31*   BUN 11   CREATININE 1.0   CALCIUM 8.8     LFT:  Lab Results   Component Value Date    AST 13 03/22/2023    GGT 51 04/30/2015    ALKPHOS 107 03/22/2023    BILITOT 0.5 03/22/2023     Albumin:   Albumin   Date Value Ref Range Status   03/22/2023 3.4 (L) 3.5 - 5.2 g/dL Final     Protein:   Total Protein   Date Value Ref Range Status   03/22/2023 7.4 6.0 - 8.4 g/dL Final     Lactic acid:   Lab Results   Component Value Date    LACTATE 1.1 03/20/2023    LACTATE 0.6 01/10/2023       Significant Imaging: I have reviewed all pertinent imaging results/findings within the past 24 hours.

## 2023-03-29 NOTE — ASSESSMENT & PLAN NOTE
67F with pmh of adrenal insufficiency, spinal injury c/b flaccid paraplegia, neurogenic bladder, neurogenic bowel exacerbated by chronic high dose intrathecal hydromorphone. Admitted to  service for CHF exacerbation of unclear provocation with concern for adrenal crisis (I/s/o insufficient chronic steroid dosing) effectively ruled out by endo workup. Palliative consulted for control of breakthrough leg pains.    3/29/2023  - Followed up with pt today.  She endorses no significant changes to her pain or fatigue. Pt is still agreeable and optimistic that initiation of Lyrica will offer additional pain relief.  -Pt sitting up in bedside chair and repots working with PT/OT today.   -Pt reports that her home dose of Norco is 10 mg Q 4-6hours PRN severe pain.  She was under the impression that Dr Stein was ordering her home dose of 10 mg and not the 7.5 mg dose currently ordered. Discussed with Dr Stein, will adjust pts does to home dose of Norco 10 mg Q6H but informed pt that will not escalate dose further.  Pt agreeable.  Order changed by writer.         -From initial consult note on 3/28/2023 by Dr Robert Stein    Resolving CHF is most compelling as the etiology of pt's acute on chronic pain. Pt may derive psychological benefit from po hydrocodone now modestly liberalized by writer however pt has long warranted adjuvant neuropathy tx and has only trialed trivial doses of gabapentin citing 'fatigue' which is a constant complaint and while polypharmacy is clearly an issue I am hopeful that pregabalin will be tolerated and effective given few remaining options to control her pain.     Recommendations:  Medical: supportive care / CHF mgmt per primary  Symptom Management:   - Continue home hydromorphone pump  - Increased Norco dose modestly from 5mg q8h prn -> 7.5mg q6h prn; minimal increase in OMEs is assessed as overall low risk  - Started pregabalin 50mg po bid  - If 'new' fatigue develops this should be temporary  "and we will encourage pt to continue for at least 14 days; if pt refuses to comply, remaining fallback options include Marinol (very safe) or SNRI tx with duloxetine or venlafaxine (higher risks of polypharmacy and QTc/CYP issues)  Psychosocial: isolated, very dependent due to severe deficits at baseline  Legal: spouse has HCPOA, reportedly living will on file - will investigate  Prognosis: unlimited, no concrete terminal hospice dx at this time   "  "

## 2023-03-29 NOTE — ASSESSMENT & PLAN NOTE
Ultrasound negative for DVT  Given 80 mg Lasix in ED  Continue gentle diuresis; on lasix 20mg bid    Redness, tenderness and swelling suggestive of cellulitis the patient does not have systemic signs of infection.  She is chronically on steroids and therefore may not mount a strong immune response.  Procalcitonin low is reassuring.  Given recurrence of erythema and severe leg pain (difficult to gauge pain given history of chronic pain syndrome) -had switch to cefazolin; appears to be improving for now  -transition to Keflex today

## 2023-03-29 NOTE — ASSESSMENT & PLAN NOTE
With intrathecal Dilaudid pump in place   Continue home dose p.r.n. Norco, Tylenol    Per patient back pain is chronic due to multiple past surgeries but patient reported worsening pain while working with PT and history of falls    Thoracolumbar spine x-ray shows no acute fracture or displacement. Okay to work with PT/OT    Palliative care consult for chronic pain and overall goals of care  - added pregabalin and increased norco 3/28

## 2023-03-29 NOTE — PLAN OF CARE
Problem: Adult Inpatient Plan of Care  Goal: Plan of Care Review  Outcome: Ongoing, Progressing  Goal: Patient-Specific Goal (Individualized)  Outcome: Ongoing, Progressing  Goal: Absence of Hospital-Acquired Illness or Injury  Outcome: Ongoing, Progressing  Goal: Optimal Comfort and Wellbeing  Outcome: Ongoing, Progressing  Goal: Readiness for Transition of Care  Outcome: Ongoing, Progressing     Problem: Skin Injury Risk Increased  Goal: Skin Health and Integrity  Outcome: Ongoing, Progressing     Problem: Infection  Goal: Absence of Infection Signs and Symptoms  Outcome: Ongoing, Progressing     Problem: UTI (Urinary Tract Infection)  Goal: Improved Infection Symptoms  Outcome: Ongoing, Progressing     Problem: Fall Injury Risk  Goal: Absence of Fall and Fall-Related Injury  Outcome: Ongoing, Progressing     Problem: COPD (Chronic Obstructive Pulmonary Disease) Comorbidity  Goal: Maintenance of COPD Symptom Control  Outcome: Ongoing, Progressing     Problem: Heart Failure Comorbidity  Goal: Maintenance of Heart Failure Symptom Control  Outcome: Ongoing, Progressing     Problem: Pain Chronic (Persistent) (Comorbidity Management)  Goal: Acceptable Pain Control and Functional Ability  Outcome: Ongoing, Progressing     Problem: Gas Exchange Impaired  Goal: Optimal Gas Exchange  Outcome: Ongoing, Progressing     Problem: Fatigue  Goal: Improved Activity Tolerance  Outcome: Ongoing, Progressing     Problem: Coping Ineffective  Goal: Effective Coping  Outcome: Ongoing, Progressing

## 2023-03-29 NOTE — PLAN OF CARE
Patient on oxygen with documented flow.  Will attempt to wean per O2 order protocol. Pt on BIPAP with documented settings. Alarms are set and working. Will continue to monitor.

## 2023-03-29 NOTE — TELEPHONE ENCOUNTER
----- Message from Paola Palma sent at 3/29/2023 11:32 AM CDT -----  Regarding: RE: HFU  Suyapa, nurse just dropped referral for scheduling.  ----- Message -----  From: Suyapa Toledo LPN  Sent: 3/29/2023  11:21 AM CDT  To: Paola Palma  Subject: RE: HFU                                          Yes, if you can request it that would be great.   ----- Message -----  From: Paola Palma  Sent: 3/28/2023   5:16 PM CDT  To: Suyapa Toledo LPN  Subject: RE: U                                          Hospital DC providers don't always put referrals in for patients. I usually can schedule without it, but if you need one, I can request it.  That's why I include DX.  ----- Message -----  From: Suyapa Toledo LPN  Sent: 3/28/2023   4:39 PM CDT  To: Paola Palma  Subject: RE: U                                          Mark Albarran,   I don't see a referral in the chart.  ----- Message -----  From: Paola Palma  Sent: 3/28/2023   4:32 PM CDT  [facility]  Subject: HFU                                              Patient will be discharging from [facility] and will need a HFU with Pulmonary.  Please schedule soonest and message me back so DC can relay appointment information to patient prior to DC.    DX: Acute respiratory failure with hypoxia and hypercarbia    Avis Agustin  Physician Referral Specialist/Discharge

## 2023-03-29 NOTE — PLAN OF CARE
Problem: Occupational Therapy  Goal: Occupational Therapy Goal  Description: Goals to be met by: 4/21/2023     Patient will increase functional independence with ADLs by performing:    UE Dressing with Set-up Assistance with overhead clothing without complaints of increased lumbar pain  LE Dressing with Minimal Assistance to return to PLOF with incorporation of improved body mechanics (ie. Avoid trunk twisting 2/2 hx of back pain)  Toileting from toilet with Stand-by Assistance for hygiene and clothing management after bowel movement.   Toilet transfer to toilet with Stand-by Assistance with incorporation of body alignment principles.  Standing x 3 minutes with BUE support as needed, SBA for balance, and without adverse change in vital signs or reports of dizziness  Functional mobility in room to access bathroom / closet area with SBA, use of least restrictive assistive device, and incorporated energy conservation techniques.    Outcome: Ongoing, Progressing   Tasha Hawley is a 67 y.o. female with a medical diagnosis of Acute respiratory failure with hypoxia and hypercarbia.  Performance deficits affecting function are weakness, impaired endurance, impaired self care skills, impaired functional mobility, gait instability, impaired balance, decreased upper extremity function, decreased lower extremity function, pain, impaired cardiopulmonary response to activity. Pt found in bed, agreeable to therapy.  Pt is progressing well towards baseline. Continue OT services to address functional goals, progressing as able.

## 2023-03-29 NOTE — PLAN OF CARE
Auth still pending for trilogy.  [facility] has accepted pt.  Pending pulm f/u appt.    Future Appointments   Date Time Provider Department Center   4/4/2023  9:20 AM Mesfin Hodges II, MD [facility] IM [address] PCW   4/4/2023  3:00 PM Kaitlynn Caro MD [facility] ALLIMM [address]   4/28/2023  1:40 PM Omaira Henriquez, SHONDA [facility] SLEEP [facility]   5/4/2023 11:00 AM Norma Pearson MD [facility] ENDODIA [address]   5/12/2023  1:00 PM Cmai Romero MD [facility] PULMSVC [facility]   7/7/2023 11:00 AM Mesfin Hodges II, MD [facility] IM [address] PCW          03/29/23 1206   Post-Acute Status   Post-Acute Authorization Home Health;HME   HME Status Pending payor review   Home Health Status Set-up Complete/Auth obtained   Hospital Resources/Appts/Education Provided Appointments scheduled by Navigator/Coordinator   Discharge Delays None known at this time   Discharge Plan   Discharge Plan A Home with family   Discharge Plan B Home Health       Ameya Porter RN   Supervisor Case Management-[facility]  [phone]

## 2023-03-29 NOTE — HPI
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 Pt has hx neurogenic bladder and extensive prior admissions and ED visits for urinary retention and/or cystitis. 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 Pt [place] [facility] [place].     Chief Complaint Leading to Admission     Pt presented to ED on 3/23 reporting progressive dependent edema and SOB without CP. CXR showed fluid overload and pt required NC for mild hypoxia. Troponin negative, BNP not elevated. Received 80mg IV lasix in ED for CHF and admitted to  service."  "

## 2023-03-29 NOTE — ASSESSMENT & PLAN NOTE
Given extreme fatigue, hypotension-consulted endocrinology for adrenal insufficiency    -continue hydrocortisone 10/5 and fludrocortisone 100

## 2023-03-29 NOTE — PT/OT/SLP PROGRESS
Occupational Therapy   Treatment    Name: Tasha Hawley  MRN: [id number]  Admitting Diagnosis:  Acute respiratory failure with hypoxia and hypercarbia       Recommendations:     Discharge Recommendations: home, home health PT, home health OT  Discharge Equipment Recommendations:  none  Barriers to discharge:  None    Assessment:     Tasha Hawley is a 67 y.o. female with a medical diagnosis of Acute respiratory failure with hypoxia and hypercarbia.  Performance deficits affecting function are weakness, impaired endurance, impaired self care skills, impaired functional mobility, gait instability, impaired balance, decreased upper extremity function, decreased lower extremity function, pain, impaired cardiopulmonary response to activity. Pt found in bed, agreeable to therapy.  Pt is progressing well towards baseline. Continue OT services to address functional goals, progressing as able.      Rehab Prognosis:  Good; patient would benefit from acute skilled OT services to address these deficits and reach maximum level of function.       Plan:     Patient to be seen 4 x/week to address the above listed problems via self-care/home management, therapeutic activities, therapeutic exercises  Plan of Care Expires: 04/21/23  Plan of Care Reviewed with: patient    Subjective     Chief Complaint: BLE pain  Patient/Family Comments/goals: go home  Pain/Comfort:  Pain Rating 1: 8/10  Location - Side 1: Bilateral  Location - Orientation 1: generalized  Location 1: leg  Pain Addressed 1: Distraction, Cessation of Activity  Pain Rating Post-Intervention 1: 9/10    Objective:     Communicated with: RN prior to session.  Patient found HOB elevated with meadows catheter, oxygen, peripheral IV, telemetry upon OT entry to room.    General Precautions: Standard, fall    Orthopedic Precautions:N/A  Braces: N/A  Respiratory Status: Nasal cannula     Occupational Performance:     Bed Mobility:    Patient completed Rolling/Turning to Right  with modified independence  Patient completed Scooting/Bridging with modified independence  Patient completed Supine to Sit with modified independence HOB elevated    Functional Mobility/Transfers:  Patient completed Sit <> Stand Transfer with supervision  with  rolling walker   Patient completed Bed <> Chair Transfer using Stand Pivot technique with supervision with rolling walker  Functional Mobility: Pt ambulated 2 x 20 feet with Supervision using RW.  Pt unable to stand upright and tends to push RW too far in front. Pt required seated rest break after 1st trial 2/2 BLE pain.     Activities of Daily Living:  Declined      AMPA 6 Click ADL: 19      Patient left up in chair with all lines intact, call button in reach, RN notified, and friend present    GOALS:   Multidisciplinary Problems       Occupational Therapy Goals          Problem: Occupational Therapy    Goal Priority Disciplines Outcome Interventions   Occupational Therapy Goal     OT, PT/OT Ongoing, Progressing    Description: Goals to be met by: 4/21/2023     Patient will increase functional independence with ADLs by performing:    UE Dressing with Set-up Assistance with overhead clothing without complaints of increased lumbar pain  LE Dressing with Minimal Assistance to return to PLOF with incorporation of improved body mechanics (ie. Avoid trunk twisting 2/2 hx of back pain)  Toileting from toilet with Stand-by Assistance for hygiene and clothing management after bowel movement.   Toilet transfer to toilet with Stand-by Assistance with incorporation of body alignment principles.  Standing x 3 minutes with BUE support as needed, SBA for balance, and without adverse change in vital signs or reports of dizziness  Functional mobility in room to access bathroom / closet area with SBA, use of least restrictive assistive device, and incorporated energy conservation techniques.                         Time Tracking:     OT Date of Treatment: 03/29/23  OT Start  Time: 1120  OT Stop Time: 1143  OT Total Time (min): 23 min    Billable Minutes:Therapeutic Activity 23               3/29/2023

## 2023-03-29 NOTE — SUBJECTIVE & OBJECTIVE
Interval History:  Still with bilateral lower extremity discomfort, though erythema improving.  Will transition to oral antibiotic today.  States that she did not sleep very well last night and is tired today.  Reports shortness of breath.  Discussed case with pulmonology; feel that large part of her issue is lack of mobility and obesity/aspiration/hypothyroidism/heart failure.  Encouraging her working with PT/OT as much as possible.    Review of Systems   Constitutional:  Positive for fatigue.   Respiratory:  Positive for shortness of breath.    Cardiovascular:  Positive for leg swelling.   Neurological:  Negative for light-headedness.   Objective:     Vital Signs (Most Recent):  Temp: 96.8 °F (36 °C) (03/29/23 1143)  Pulse: (!) 53 (03/29/23 1232)  Resp: 19 (03/29/23 1143)  BP: (!) 101/52 (03/29/23 1143)  SpO2: 98 % (03/29/23 1220)   Vital Signs (24h Range):  Temp:  [96.3 °F (35.7 °C)-98.7 °F (37.1 °C)] 96.8 °F (36 °C)  Pulse:  [49-62] 53  Resp:  [18-20] 19  SpO2:  [95 %-99 %] 98 %  BP: ()/(46-62) 101/52     Weight: 100.4 kg (221 lb 5.5 oz)  Body mass index is 34.67 kg/m².  No intake or output data in the 24 hours ending 03/29/23 1258     Physical Exam  Vitals and nursing note reviewed.   Constitutional:       General: She is not in acute distress.     Appearance: She is ill-appearing.   Eyes:      General: No scleral icterus.  Cardiovascular:      Rate and Rhythm: Regular rhythm. Bradycardia present.      Pulses: Normal pulses.      Heart sounds: Normal heart sounds. No murmur heard.  Pulmonary:      Effort: Pulmonary effort is normal. No respiratory distress.      Breath sounds: No wheezing.   Abdominal:      Palpations: Abdomen is soft.      Tenderness: There is no abdominal tenderness.   Musculoskeletal:      Right lower leg: Edema present.      Left lower leg: Edema present.      Comments: Bilateral lower extremity erythema and tenderness on palpation, no palpable abscess or open wound  Lower extremity  edema, redness improved   Skin:     General: Skin is warm and dry.      Findings: No bruising.   Neurological:      Mental Status: She is alert and oriented to person, place, and time.       Significant Labs: All pertinent labs within the past 24 hours have been reviewed.    Significant Imaging: I have reviewed all pertinent imaging results/findings within the past 24 hours.

## 2023-03-29 NOTE — PROGRESS NOTES
[facility] Medicine  Progress Note    Patient Name: Tasha Hawley  MRN: [id number]  Patient Class: IP- Inpatient   Admission Date: 3/22/2023  Length of Stay: 5 days  Attending Physician: Nic Mistry, *  Primary Care Provider: Mesfin Hodges Ii, MD        Subjective:     Principal Problem:Acute respiratory failure with hypoxia and hypercarbia        HPI:  67 year old female with a history of CHF, cirrhosis, meadows dependence presents with shortness of breath, myalgias, and lower extremity edema. The swelling has worsened and now there is redness. She says she does not wear oxygen at home. No chest pain. Reports recently seen at [facility] for UTI and was started on dicloxacillin. She says she had diarrhea as well but this was before the abx initiation.  UA appeared grossly infected 3 days ago however no growth to date on urine culture.  Patient with lower extremity edema.  Given 80 mg Lasix in ED. chest x-ray with interstitial edema.  Patient newly on O2 requirements.  Troponin negative.  BNP negative.  Patient will be admitted to Hospital Medicine      Overview/Hospital Course:  No notes on file    Interval History:  Still with bilateral lower extremity discomfort, though erythema improving.  Will transition to oral antibiotic today.  States that she did not sleep very well last night and is tired today.  Reports shortness of breath.  Discussed case with pulmonology; feel that large part of her issue is lack of mobility and obesity/aspiration/hypothyroidism/heart failure.  Encouraging her working with PT/OT as much as possible.    Review of Systems   Constitutional:  Positive for fatigue.   Respiratory:  Positive for shortness of breath.    Cardiovascular:  Positive for leg swelling.   Neurological:  Negative for light-headedness.   Objective:     Vital Signs (Most Recent):  Temp: 96.8 °F (36 °C) (03/29/23 1143)  Pulse: (!) 53 (03/29/23 1232)  Resp: 19 (03/29/23 1143)  BP: (!) 101/52  (03/29/23 1143)  SpO2: 98 % (03/29/23 1220)   Vital Signs (24h Range):  Temp:  [96.3 °F (35.7 °C)-98.7 °F (37.1 °C)] 96.8 °F (36 °C)  Pulse:  [49-62] 53  Resp:  [18-20] 19  SpO2:  [95 %-99 %] 98 %  BP: ()/(46-62) 101/52     Weight: 100.4 kg (221 lb 5.5 oz)  Body mass index is 34.67 kg/m².  No intake or output data in the 24 hours ending 03/29/23 1258     Physical Exam  Vitals and nursing note reviewed.   Constitutional:       General: She is not in acute distress.     Appearance: She is ill-appearing.   Eyes:      General: No scleral icterus.  Cardiovascular:      Rate and Rhythm: Regular rhythm. Bradycardia present.      Pulses: Normal pulses.      Heart sounds: Normal heart sounds. No murmur heard.  Pulmonary:      Effort: Pulmonary effort is normal. No respiratory distress.      Breath sounds: No wheezing.   Abdominal:      Palpations: Abdomen is soft.      Tenderness: There is no abdominal tenderness.   Musculoskeletal:      Right lower leg: Edema present.      Left lower leg: Edema present.      Comments: Bilateral lower extremity erythema and tenderness on palpation, no palpable abscess or open wound  Lower extremity edema, redness improved   Skin:     General: Skin is warm and dry.      Findings: No bruising.   Neurological:      Mental Status: She is alert and oriented to person, place, and time.       Significant Labs: All pertinent labs within the past 24 hours have been reviewed.    Significant Imaging: I have reviewed all pertinent imaging results/findings within the past 24 hours.      Assessment/Plan:      * Acute respiratory failure with hypoxia and hypercarbia  Patient with Hypoxic Respiratory failure which is Acute.  she is not on home oxygen. Supplemental oxygen was provided and noted- Oxygen Concentration (%):  [40] 40.   Signs/symptoms of respiratory failure include- shortness of breath.     On 2 L oxygen via NC saturating 96%.  Suspect dyspnea and hypoxia multifactorial in setting of  atelectasis, ?Mild volume overload (BNP maybe falsely low in obesity)    CTA chest ruled out PE, no large focal consolidation noted.  Procalcitonin negative less suggestive of pneumonia.  CT chest showed emphysema.  Not on home inhalers.  Suspect obstructive airway disease.  DuoNebs on back order.  Add Spiriva for now.  P.r.n. albuterol.  ABG showed pCO2 64, based on pH this appears to be chronic.  Per discussion with Pulmonary fellow, she would meet criteria for outpatient BiPAP use.      Incentive spirometer.  Increase mobility. Suspect subjective dyspnea may be in setting of volume overload +/- deconditioning or a systemic problem.  Patient reported lightheadedness while working with physical therapy which could be in setting of orthostasis as she was on suboptimal steroid dosing for adrenal insufficiency.  On chart review, discharged 01/2023 on total hydrocortisone 15 mg q.d..  Fludrocortisone added.  Also noted on chart review, last TSH in 01/2023 was 51.  Repeat TSH elevated to 41 but free T4 within normal limits.    s/p IV diuresis, continue. Incentive spirometer, PT, encourage mobilization.  BiPAP q.h.s.    Pulmonary consult given patient has persistent subjective dyspnea  - multifactorial due to chronic aspiration, volume overload, hypothyroidism    Adrenal insufficiency  Given extreme fatigue, hypotension-consulted endocrinology for adrenal insufficiency    -continue hydrocortisone 10/5 and fludrocortisone 100    UTI (urinary tract infection)  Last urine culture 03/20/2023 was negative.  UA this hospital stay did not show significant pyuria.  Patient has had several previous urine cultures with staph but also has a suprapubic catheter which may be colonized    Per patient she is supposed to be on oral dicloxacillin at home.  This drug is not available on formulary in this hospital and therefore was switched to Keflex.  Given concern for possible cellulitis of lower extremity, will do ceftriaxone for now  but do not think UTI needs to be treated at this time.  Blood cultures 3/23 show no growth till date    Debility  Deconditioned with almost bed-bound status  PT/OT      S/P insertion of intrathecal pump  Follows with pain clinic outpatient   Per patient change of pump due on 03/30/2023      Lymphedema of both lower extremities  Ultrasound negative for DVT  Given 80 mg Lasix in ED  Continue gentle diuresis; on lasix 20mg bid    Redness, tenderness and swelling suggestive of cellulitis the patient does not have systemic signs of infection.  She is chronically on steroids and therefore may not mount a strong immune response.  Procalcitonin low is reassuring.  Given recurrence of erythema and severe leg pain (difficult to gauge pain given history of chronic pain syndrome) -had switch to cefazolin; appears to be improving for now  -transition to Keflex today    Primary hypothyroidism  Last TSH 51.8 in 01/2023, TSH remains elevated at 41 but free T4 normal    Increased synthroid to 150mcg; added cytomel 25mg qd              Neurogenic bladder  S/p suprapubic catheter      GERD (gastroesophageal reflux disease)  -continue daily PPI      SOB (shortness of breath)  As above      Chronic pain syndrome  With intrathecal Dilaudid pump in place   Continue home dose p.r.n. Norco, Tylenol    Per patient back pain is chronic due to multiple past surgeries but patient reported worsening pain while working with PT and history of falls    Thoracolumbar spine x-ray shows no acute fracture or displacement. Okay to work with PT/OT    Palliative care consult for chronic pain and overall goals of care  - added pregabalin and increased norco 3/28      Sleep apnea  -would benefit from noninvasive ventilation   -ordered      VTE Risk Mitigation (From admission, onward)         Ordered     enoxaparin injection 40 mg  Daily         03/23/23 0156     IP VTE HIGH RISK PATIENT  Once         03/23/23 0156                Discharge Planning   JACKIE:  3/27/2023     Code Status: Full Code   Is the patient medically ready for discharge?:     Reason for patient still in hospital (select all that apply): Patient trending condition and Treatment  Discharge Plan A: Home with family   Discharge Delays: None known at this time              Nic Mistry MD  Department of Hospital Medicine   [facility]

## 2023-03-29 NOTE — PROGRESS NOTES
[facility]  Palliative Medicine  Progress Note    Patient Name: Tasha Hawley  MRN: [id number]  Admission Date: 3/22/2023  Hospital Length of Stay: 5 days  Code Status: Full Code   Attending Provider: Nic Mistry, *  Consulting Provider: Violeta Galindo NP  Primary Care Physician: Mesfin Hodges Ii, MD  Principal Problem:Acute respiratory failure with hypoxia and hypercarbia    Patient information was obtained from patient, past medical records and primary team.      Assessment/Plan:     Palliative Care  Palliative care encounter  67F with pmh of adrenal insufficiency, spinal injury c/b flaccid paraplegia, neurogenic bladder, neurogenic bowel exacerbated by chronic high dose intrathecal hydromorphone. Admitted to  service for CHF exacerbation of unclear provocation with concern for adrenal crisis (I/s/o insufficient chronic steroid dosing) effectively ruled out by endo workup. Palliative consulted for control of breakthrough leg pains.    3/29/2023  - Followed up with pt today.  She endorses no significant changes to her pain or fatigue. Pt is still agreeable and optimistic that initiation of Lyrica will offer additional pain relief.  -Pt sitting up in bedside chair and repots working with PT/OT today.   -Pt reports that her home dose of Norco is 10 mg Q 4-6hours PRN severe pain.  She was under the impression that Dr Stein was ordering her home dose of 10 mg and not the 7.5 mg dose currently ordered. Discussed with Dr Stein, will adjust pts Norco does to home dose of Norco 10 mg Q6H PRN severe pain, breakthrough but informed pt that will not escalate dose further.  Pt agreeable.  Order changed by writer.       -From initial consult note on 3/28/2023 by Dr Robert Stein    Resolving CHF is most compelling as the etiology of pt's acute on chronic pain. Pt may derive psychological benefit from po hydrocodone now modestly liberalized by writer however pt has long warranted adjuvant neuropathy tx  "and has only trialed trivial doses of gabapentin citing 'fatigue' which is a constant complaint and while polypharmacy is clearly an issue I am hopeful that pregabalin will be tolerated and effective given few remaining options to control her pain.  Recommendations:  Medical: supportive care / CHF mgmt per primary  Symptom Management:   - Continue home hydromorphone pump  - Increased Norco dose modestly from 5mg q8h prn -> 7.5mg q6h prn; minimal increase in OMEs is assessed as overall low risk  - Started pregabalin 50mg po bid  - If 'new' fatigue develops this should be temporary and we will encourage pt to continue for at least 14 days; if pt refuses to comply, remaining fallback options include Marinol (very safe) or SNRI tx with duloxetine or venlafaxine (higher risks of polypharmacy and QTc/CYP issues)  Psychosocial: isolated, very dependent due to severe deficits at baseline  Legal: spouse has HCPOA, reportedly living will on file - will investigate  Prognosis: unlimited, no concrete terminal hospice dx at this time   "      I will follow-up with patient. Please contact us if you have any additional questions.    Subjective:     Chief Complaint:   Chief Complaint   Patient presents with    Shortness of Breath     Pt c/o SOB x 3 days. Speaks in complete sentences. Recently Dx w/ staph infection and started taking ABX yesterday. No obvious signs of allergic reaction. +A/O x 4 w/ ABCs intact, NAD. Pt on O2 @ 2 LPM. ETCO2 50. VSS. EMS pulse ox not working/ unknown room air PTA.       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 Pt has hx neurogenic bladder and " "extensive prior admissions and ED visits for urinary retention and/or cystitis. 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 Pt was recently treated for a ?staph UTI at [facility] with dicloxacillin.     Chief Complaint Leading to Admission     Pt presented to ED on 3/23 reporting progressive dependent edema and SOB without CP. CXR showed fluid overload and pt required NC for mild hypoxia. Troponin negative, BNP not elevated. Received 80mg IV lasix in ED for CHF and admitted to  service."    Interval History: no acute events reported overnight,  Pt worked with OT today,     Medications:  Continuous Infusions:  Scheduled Meds:   aspirin  81 mg Oral Daily    atorvastatin  80 mg Oral Daily    cephALEXin  500 mg Oral Q8H    enoxaparin  40 mg Subcutaneous Daily    fludrocortisone  100 mcg Oral Daily    FLUoxetine  60 mg Oral Daily    furosemide (LASIX) injection  40 mg Intravenous BID    hydrocortisone  10 mg Oral Daily    hydrocortisone  5 mg Oral Daily PM    levothyroxine  150 mcg Oral Before breakfast    liothyronine  25 mcg Oral Daily    pantoprazole  40 mg Oral Daily    potassium bicarbonate  20 mEq Oral BID    potassium bicarbonate  40 mEq Oral Once    pregabalin  50 mg Oral BID    QUEtiapine  200 mg Oral QHS    tiotropium bromide  2 puff Inhalation Daily    traZODone  50 mg Oral QHS     PRN Meds:aluminum-magnesium hydroxide-simethicone, butalbital-acetaminophen-caffeine -40 mg, HYDROcodone-acetaminophen, polyethylene glycol, senna    Objective:     Vital Signs (Most Recent):  Temp: 96.8 °F (36 °C) (03/29/23 1143)  Pulse: (!) 53 (03/29/23 1232)  Resp: 19 (03/29/23 1143)  BP: (!) 101/52 (03/29/23 1143)  SpO2: 98 % (03/29/23 1220)   Vital Signs (24h Range):  Temp:  [96.3 °F (35.7 °C)-98.7 °F (37.1 °C)] 96.8 °F (36 °C)  Pulse:  [49-62] 53  Resp:  [18-20] 19  SpO2:  [95 %-99 %] 98 %  BP: ()/(46-62) 101/52     Weight: 100.4 kg (221 lb 5.5 oz)  Body " mass index is 34.67 kg/m².  Physical Exam  Constitutional:       General: She is not in acute distress.     Appearance: She is obese. She is not toxic-appearing. Chronically ill   HENT:      Head: Normocephalic and atraumatic.   Mouth: Mucous membranes are moist.       Comments: edentulous  Eyes:      Extraocular Movements: Extraocular movements intact.      Comments: Pupils pinpoint   Cardiovascular:      Rate and Rhythm: Regular rhythm. Bradycardia present.      Pulses: Normal pulses.      Heart sounds: Normal heart sounds. No murmur heard.    Pulmonary:      Effort: Pulmonary effort is normal. No respiratory distress.      Breath sounds: decreased breath sounds. Abdominal:      General: Abdomen is flat. Bowel sounds are decreased. There is no distension.      Palpations: Abdomen is soft.      Tenderness: There is no abdominal tenderness.   Musculoskeletal:      Right lower leg: Edema present.      Left lower leg: Edema present.      Comments: nonpitting   Skin:     Coloration: Skin is pale and sallow.   Neurological:      Mental Status: She is alert and oriented to person, place, and time. Mental status is at baseline.      Motor: Weakness (flaccid paraplegia of LLE; RLE 3/5 strength) present.      Comments: Allodynia of bilateral legs R>>L     Review of Symptoms        Symptom Assessment (ESAS 0-10 Scale)  Pain:  6  Dyspnea:  0  Anxiety:  0  Nausea:  0  Depression:  0  Anorexia:  0  Fatigue:  0  Insomnia:  0  Restlessness:  0  Agitation:  0      CAM / Delirium:  Negative  Constipation:  Positive  Diarrhea:  Negative        ECOG Performance Status thGthrthathdtheth:th th5th Living Arrangements:  Lives with family and Lives in home     Psychosocial/Cultural:   See Palliative Psychosocial Note: No  Social Issues Identified: no  Bereavement Risk: non  Caregiver Needs Discussed. Caregiver Distress: no  Cultural: none  **Primary  to Follow**  Palliative Care  Consult: No     Spiritual:  F - Mindi and  Belief:  Judaism  I - Importance:  Low  C - Community:  None active  A - Address in Care:  [facility]     Time-Based Charting:  Yes  Chart Review: 20 minutes  Face to Face: 11 minutes  Symptom Assessment: 9 minutes  Coordination of Care: 5 minutes  Discharge Plannin minutes     Total Time Spent: 50 minutes    Advance Care Planning   Advance Directives:   Goals of Care: What is most important right now is to focus on symptom/pain control, extending life as long as possible, even it it means sacrificing quality. Accordingly, we have decided that the best plan to meet the patient's goals includes continuing with treatment.       Significant Labs: All pertinent labs within the past 24 hours have been reviewed.  CBC:   Recent Labs   Lab 23  1946 23  1340   WBC 5.32  --    HGB 10.3*  --    HCT 33.1* 32*   MCV 84  --      --      BMP:  Recent Labs   Lab 23  0435   *      K 4.2   CL 99   CO2 31*   BUN 11   CREATININE 1.0   CALCIUM 8.8     LFT:  Lab Results   Component Value Date    AST 13 2023    GGT 51 2015    ALKPHOS 107 2023    BILITOT 0.5 2023     Albumin:   Albumin   Date Value Ref Range Status   2023 3.4 (L) 3.5 - 5.2 g/dL Final     Protein:   Total Protein   Date Value Ref Range Status   2023 7.4 6.0 - 8.4 g/dL Final     Lactic acid:   Lab Results   Component Value Date    LACTATE 1.1 2023    LACTATE 0.6 01/10/2023       Significant Imaging: I have reviewed all pertinent imaging results/findings within the past 24 hours.      Violeta Galindo NP  Palliative Medicine  [facility]

## 2023-03-30 LAB
ANION GAP SERPL CALC-SCNC: 10 MMOL/L (ref 8–16)
BUN SERPL-MCNC: 13 MG/DL (ref 8–23)
CALCIUM SERPL-MCNC: 8.7 MG/DL (ref 8.7–10.5)
CHLORIDE SERPL-SCNC: 96 MMOL/L (ref 95–110)
CO2 SERPL-SCNC: 33 MMOL/L (ref 23–29)
CREAT SERPL-MCNC: 0.8 MG/DL (ref 0.5–1.4)
EST. GFR  (NO RACE VARIABLE): >60 ML/MIN/1.73 M^2
GLUCOSE SERPL-MCNC: 85 MG/DL (ref 70–110)
POTASSIUM SERPL-SCNC: 4.5 MMOL/L (ref 3.5–5.1)
SODIUM SERPL-SCNC: 139 MMOL/L (ref 136–145)

## 2023-03-30 PROCEDURE — 93005 ELECTROCARDIOGRAM TRACING: CPT | Mod: HCNC

## 2023-03-30 PROCEDURE — 25000003 PHARM REV CODE 250: Mod: HCNC | Performed by: STUDENT IN AN ORGANIZED HEALTH CARE EDUCATION/TRAINING PROGRAM

## 2023-03-30 PROCEDURE — 97116 GAIT TRAINING THERAPY: CPT | Mod: HCNC,CQ

## 2023-03-30 PROCEDURE — 97535 SELF CARE MNGMENT TRAINING: CPT | Mod: HCNC

## 2023-03-30 PROCEDURE — 93010 ELECTROCARDIOGRAM REPORT: CPT | Mod: HCNC,,, | Performed by: INTERNAL MEDICINE

## 2023-03-30 PROCEDURE — 99900035 HC TECH TIME PER 15 MIN (STAT): Mod: HCNC

## 2023-03-30 PROCEDURE — 94799 UNLISTED PULMONARY SVC/PX: CPT | Mod: HCNC

## 2023-03-30 PROCEDURE — 11000001 HC ACUTE MED/SURG PRIVATE ROOM: Mod: HCNC

## 2023-03-30 PROCEDURE — 63600175 PHARM REV CODE 636 W HCPCS: Mod: HCNC | Performed by: STUDENT IN AN ORGANIZED HEALTH CARE EDUCATION/TRAINING PROGRAM

## 2023-03-30 PROCEDURE — 25000003 PHARM REV CODE 250: Mod: HCNC | Performed by: HOSPITALIST

## 2023-03-30 PROCEDURE — 99233 SBSQ HOSP IP/OBS HIGH 50: CPT | Mod: HCNC,,, | Performed by: STUDENT IN AN ORGANIZED HEALTH CARE EDUCATION/TRAINING PROGRAM

## 2023-03-30 PROCEDURE — 97110 THERAPEUTIC EXERCISES: CPT | Mod: HCNC,CQ

## 2023-03-30 PROCEDURE — 36415 COLL VENOUS BLD VENIPUNCTURE: CPT | Mod: HCNC | Performed by: HOSPITALIST

## 2023-03-30 PROCEDURE — 25000003 PHARM REV CODE 250: Mod: HCNC

## 2023-03-30 PROCEDURE — 63600175 PHARM REV CODE 636 W HCPCS: Mod: HCNC | Performed by: HOSPITALIST

## 2023-03-30 PROCEDURE — 25000003 PHARM REV CODE 250: Mod: HCNC | Performed by: REGISTERED NURSE

## 2023-03-30 PROCEDURE — 97530 THERAPEUTIC ACTIVITIES: CPT | Mod: HCNC

## 2023-03-30 PROCEDURE — 63600175 PHARM REV CODE 636 W HCPCS: Mod: HCNC | Performed by: REGISTERED NURSE

## 2023-03-30 PROCEDURE — 93010 EKG 12-LEAD: ICD-10-PCS | Mod: HCNC,,, | Performed by: INTERNAL MEDICINE

## 2023-03-30 PROCEDURE — 25000242 PHARM REV CODE 250 ALT 637 W/ HCPCS: Mod: HCNC | Performed by: STUDENT IN AN ORGANIZED HEALTH CARE EDUCATION/TRAINING PROGRAM

## 2023-03-30 PROCEDURE — 27000221 HC OXYGEN, UP TO 24 HOURS: Mod: HCNC

## 2023-03-30 PROCEDURE — 99233 PR SUBSEQUENT HOSPITAL CARE,LEVL III: ICD-10-PCS | Mod: HCNC,,, | Performed by: STUDENT IN AN ORGANIZED HEALTH CARE EDUCATION/TRAINING PROGRAM

## 2023-03-30 PROCEDURE — 25000003 PHARM REV CODE 250: Mod: HCNC | Performed by: NURSE PRACTITIONER

## 2023-03-30 PROCEDURE — 94761 N-INVAS EAR/PLS OXIMETRY MLT: CPT | Mod: HCNC

## 2023-03-30 PROCEDURE — 94660 CPAP INITIATION&MGMT: CPT | Mod: HCNC

## 2023-03-30 PROCEDURE — 97530 THERAPEUTIC ACTIVITIES: CPT | Mod: HCNC,CQ

## 2023-03-30 PROCEDURE — 80048 BASIC METABOLIC PNL TOTAL CA: CPT | Mod: HCNC | Performed by: HOSPITALIST

## 2023-03-30 PROCEDURE — 25000003 PHARM REV CODE 250: Mod: HCNC | Performed by: INTERNAL MEDICINE

## 2023-03-30 RX ORDER — KETOROLAC TROMETHAMINE 30 MG/ML
15 INJECTION, SOLUTION INTRAMUSCULAR; INTRAVENOUS ONCE
Status: COMPLETED | OUTPATIENT
Start: 2023-03-30 | End: 2023-03-30

## 2023-03-30 RX ADMIN — CEPHALEXIN 500 MG: 500 CAPSULE ORAL at 02:03

## 2023-03-30 RX ADMIN — FLUOXETINE HYDROCHLORIDE 60 MG: 20 CAPSULE ORAL at 08:03

## 2023-03-30 RX ADMIN — ENOXAPARIN SODIUM 40 MG: 40 INJECTION SUBCUTANEOUS at 04:03

## 2023-03-30 RX ADMIN — KETOROLAC TROMETHAMINE 15 MG: 30 INJECTION, SOLUTION INTRAMUSCULAR; INTRAVENOUS at 02:03

## 2023-03-30 RX ADMIN — TRAZODONE HYDROCHLORIDE 50 MG: 50 TABLET ORAL at 09:03

## 2023-03-30 RX ADMIN — FLUDROCORTISONE ACETATE 100 MCG: 0.1 TABLET ORAL at 08:03

## 2023-03-30 RX ADMIN — FUROSEMIDE 40 MG: 10 INJECTION INTRAMUSCULAR; INTRAVENOUS at 04:03

## 2023-03-30 RX ADMIN — CEPHALEXIN 500 MG: 500 CAPSULE ORAL at 09:03

## 2023-03-30 RX ADMIN — PANTOPRAZOLE SODIUM 40 MG: 40 TABLET, DELAYED RELEASE ORAL at 08:03

## 2023-03-30 RX ADMIN — HYDROCODONE BITARTRATE AND ACETAMINOPHEN 1 TABLET: 10; 325 TABLET ORAL at 10:03

## 2023-03-30 RX ADMIN — QUETIAPINE FUMARATE 200 MG: 100 TABLET ORAL at 09:03

## 2023-03-30 RX ADMIN — TIOTROPIUM BROMIDE INHALATION SPRAY 2 PUFF: 3.12 SPRAY, METERED RESPIRATORY (INHALATION) at 12:03

## 2023-03-30 RX ADMIN — CEPHALEXIN 500 MG: 500 CAPSULE ORAL at 06:03

## 2023-03-30 RX ADMIN — ATORVASTATIN CALCIUM 80 MG: 40 TABLET, FILM COATED ORAL at 08:03

## 2023-03-30 RX ADMIN — PREGABALIN 50 MG: 50 CAPSULE ORAL at 09:03

## 2023-03-30 RX ADMIN — PREGABALIN 50 MG: 50 CAPSULE ORAL at 08:03

## 2023-03-30 RX ADMIN — HYDROXYZINE HYDROCHLORIDE 25 MG: 25 TABLET ORAL at 02:03

## 2023-03-30 RX ADMIN — LIOTHYRONINE SODIUM 25 MCG: 25 TABLET ORAL at 08:03

## 2023-03-30 RX ADMIN — ASPIRIN 81 MG: 81 TABLET, COATED ORAL at 08:03

## 2023-03-30 RX ADMIN — HYDROCORTISONE 10 MG: 5 TABLET ORAL at 08:03

## 2023-03-30 RX ADMIN — LEVOTHYROXINE SODIUM 150 MCG: 25 TABLET ORAL at 06:03

## 2023-03-30 RX ADMIN — HYDROCODONE BITARTRATE AND ACETAMINOPHEN 1 TABLET: 10; 325 TABLET ORAL at 09:03

## 2023-03-30 RX ADMIN — POTASSIUM BICARBONATE 20 MEQ: 391 TABLET, EFFERVESCENT ORAL at 09:03

## 2023-03-30 RX ADMIN — BUTALBITAL, ACETAMINOPHEN, AND CAFFEINE 1 TABLET: 50; 325; 40 TABLET ORAL at 02:03

## 2023-03-30 RX ADMIN — HYDROCORTISONE 5 MG: 5 TABLET ORAL at 05:03

## 2023-03-30 NOTE — PROGRESS NOTES
Palliative Care Daily Progress Note     S: Medical record reviewed, followed up with patient regarding patient's condition. She reports better pain control with uptitration of hydrocodone (now on home regimen of 10mg q6h prn) and continued low dose pregabalin which is well tolerated.    Pt reported diffuse chest pain today, pleuritic and also reproducible on palpation. Responded very well to a one time test dose of IV ketorolac which is clinically most consistent with costochondritis. Witnessed pt ambulating to door and back using rolling walker, very slow and with severe difficulty, requiring heavy cuing from PT. Pt's legs are much less tender since starting Lyrica and ambulatory tolerance is improved for this reason though still profoundly deconditioned.    Per discussion with pharmacy we do NOT have a plan to refill pt's pain pump in-house as the RN from pt's pain clinic is not permitted to access the pump while pt is admitted. Pt's pump is due for refill today and we will not be able to determine how much medication the pump is actually providing from this point forward. Recommend prepping for discharge with immediate f/u at pain clinic before pt develops severe withdrawal; I am concerned that we will not be able to safely approximate pt's intrathecal dose with peripheral administration of hydromorphone (equates to a continuous drip at 3.85 mg/hr).    B: Code Status: Full Code   Advanced Directives:   Spouse has default legal MPOA; pt reports concern for dementia; will discuss naming son as MPOA  Family/Support: visitor at bedside, plans to return home with    Physician's Plan of Care Goal is prep for discharge and ensure prompt followup from pain clinic RN, can potentially refill pump at home  Labs: noncontributory   Diagnostics: noncontributory    Physical Exam  Constitutional:       Appearance: She is obese. She is ill-appearing (chronically).   HENT:      Head: Normocephalic and atraumatic.       Mouth/Throat:      Mouth: Mucous membranes are moist.      Comments: edentulous  Eyes:      Extraocular Movements: Extraocular movements intact.      Comments: pinpoint   Cardiovascular:      Rate and Rhythm: Normal rate and regular rhythm.      Pulses: Normal pulses.      Heart sounds: Normal heart sounds. No murmur heard.    No friction rub. No gallop.   Pulmonary:      Effort: Pulmonary effort is normal.      Breath sounds: Normal breath sounds. No wheezing, rhonchi or rales.   Abdominal:      General: Abdomen is flat. There is no distension.      Palpations: Abdomen is soft.      Tenderness: There is no abdominal tenderness.   Musculoskeletal:      Right lower leg: Edema present.      Left lower leg: Edema present.   Skin:     Coloration: Skin is pale and sallow.      Comments: BLE venous stasis dermatitis, erythematous, significantly less tender than on prior exam   Neurological:      Mental Status: She is alert and oriented to person, place, and time.      Motor: Weakness present.      Gait: Gait abnormal.   Psychiatric:         Mood and Affect: Mood normal.         Behavior: Behavior normal.         Thought Content: Thought content normal.         Judgment: Judgment normal.     A: Patient's current condition good.   Patient Symptom Assessment Flowsheet has been completed.   Family is involved in dispo planning.  Discharge Planning: home with HHS    # Costochondritis  - responded well to 1x IV toradol  - no standing order at this time, should self resolve  - can start aleve 220mg bid prn if recurrent    # Neuropathy  - due to chronic spinal injury  - continue Lyrica 50mg bid for now; recommend uptitrating to 100mg bid after one week of therapy (on 4/4/23)  - continue home Norco 10mg q6h prn on top of pt's hydromorphone pump; as above I do not believe we can safely replicate this dose peripherally and recommend prepping for discharge and ensuring prompt attention by pt's pain mgmt RN to refill the pump    R:  Support offered at this time.   Palliative Care Team will continue to follow patient.     Robert Stein MD  Hospice and Palliative Medicine  Palliative Care Pager: [phone]    Total time spent: 52 minutes  > 50% of 52 minute visit spent in chart review, face to face discussion of symptoms, coordination of care with other specialties, and discharge planning.

## 2023-03-30 NOTE — PLAN OF CARE
Continue OT POC. Patient with increased fatigue on this session (stating exhaustion from AM PT session), but less tender to touch on BLE which allows for increased engagement in LB ADLs. Patient with one significant LOB during functional mobility requiring moderate assist to correct. Note to follow. Recommend consideration of post acute therapy upon medically stable - concern for immediate transition to home due to limited assist available secondary due to patient's self reported concerns for her spouse whom she states has memory deficits.    Problem: Occupational Therapy  Goal: Occupational Therapy Goal  Description: Goals to be met by: 4/21/2023     Patient will increase functional independence with ADLs by performing:    UE Dressing with Set-up Assistance with overhead clothing without complaints of increased lumbar pain  LE Dressing with Minimal Assistance to return to PLOF with incorporation of improved body mechanics (ie. Avoid trunk twisting 2/2 hx of back pain)  Toileting from toilet with Stand-by Assistance for hygiene and clothing management after bowel movement.   Toilet transfer to toilet with Stand-by Assistance with incorporation of body alignment principles.  Standing x 3 minutes with BUE support as needed, SBA for balance, and without adverse change in vital signs or reports of dizziness  Functional mobility in room to access bathroom / closet area with SBA, use of least restrictive assistive device, and incorporated energy conservation techniques.    Outcome: Ongoing, Progressing

## 2023-03-30 NOTE — SUBJECTIVE & OBJECTIVE
Interval History:  Bilateral lower extremity , but cellulitis does appear to be improving.  Switched to oral antibiotic and will continue.  Still with significant hypoxia and feeling short of breath.  O2 sats are in normal range however; will attempt to wean off supplemental oxygen today and obtain fan for bedside.  Encouraging work with PT/OT.    Review of Systems   Constitutional:  Positive for fatigue.   Respiratory:  Positive for shortness of breath.    Cardiovascular:  Positive for leg swelling.   Neurological:  Negative for light-headedness.   Objective:     Vital Signs (Most Recent):  Temp: 98.2 °F (36.8 °C) (03/30/23 1137)  Pulse: (!) 56 (03/30/23 1215)  Resp: 18 (03/30/23 1206)  BP: (!) 101/51 (03/30/23 1137)  SpO2: 96 % (03/30/23 1206)   Vital Signs (24h Range):  Temp:  [96.6 °F (35.9 °C)-98.2 °F (36.8 °C)] 98.2 °F (36.8 °C)  Pulse:  [50-89] 56  Resp:  [16-20] 18  SpO2:  [95 %-99 %] 96 %  BP: ()/(50-60) 101/51     Weight: 100.4 kg (221 lb 5.5 oz)  Body mass index is 34.67 kg/m².    Intake/Output Summary (Last 24 hours) at 3/30/2023 1416  Last data filed at 3/30/2023 0917  Gross per 24 hour   Intake 120 ml   Output 4050 ml   Net -3930 ml        Physical Exam  Vitals and nursing note reviewed.   Constitutional:       General: She is not in acute distress.     Appearance: She is ill-appearing.   Eyes:      General: No scleral icterus.  Cardiovascular:      Rate and Rhythm: Regular rhythm. Bradycardia present.      Pulses: Normal pulses.      Heart sounds: Normal heart sounds. No murmur heard.  Pulmonary:      Effort: Pulmonary effort is normal. No respiratory distress.      Breath sounds: No wheezing.   Abdominal:      Palpations: Abdomen is soft.      Tenderness: There is no abdominal tenderness.   Musculoskeletal:      Right lower leg: Edema present.      Left lower leg: Edema present.      Comments: Bilateral lower extremity erythema and tenderness on palpation, no palpable abscess or  open wound  Lower extremity edema, redness improved   Skin:     General: Skin is warm and dry.      Findings: No bruising.   Neurological:      Mental Status: She is alert and oriented to person, place, and time.       Significant Labs: All pertinent labs within the past 24 hours have been reviewed.    Significant Imaging: I have reviewed all pertinent imaging results/findings within the past 24 hours.

## 2023-03-30 NOTE — PLAN OF CARE
met with patient on rounds this morning. She is not ready for discharge yet. PCP and Pulmonary follow-up appointments scheduled. She is current with [facility]. Will need to send Home Health orders at discharge. I contacted Sanjana with [facility]. She is aware possible discharge tomorrow. She will update me once insurance auth received. Patient encouraged to call with any questions or concerns.  will continue to follow patient through transitions of care and assist with any discharge needs.     Sanjana--[facility] Phone#[phone] Fax#[phone] Email: [email or web address]    1:46 pm--Sanjana with [employer] stated trilogy still pending.    Patient Contacts    Name Relation Home Work Mobile   Dangelo Hawley Spouse [phone]   Lisa Thompson Daughter   [phone]   Saray Thompson   [phone]     Future Appointments   Date Time Provider Department Center   4/4/2023  9:20 AM Mesfin Hodges II, MD [facility] DANY [facility]   4/4/2023  3:00 PM Kaitlynn Caro MD [facility] ALLIMAGNES [place] [facility]   4/28/2023  1:40 PM Omaira Henriquez, SHONDA [facility] SLEEP [facility]   5/4/2023 11:00 AM Norma Pearson MD [facility] ENDODIA [facility]   5/12/2023  1:00 PM Cami Romero MD [facility] PULMSVC [facility]   7/7/2023 11:00 AM Mesfin Hodges II, MD [facility]      [facility]   [phone] [address] [facility] [address]      Next Steps: Follow up  Appointment:   Instructions: [employer] [facility] [employer] [facility]  Home Health Services, Home Therapy Services [phone] [address] [place] [id number]     Next Steps: Follow up  Appointment:   Instructions: [employer]      03/30/23 1226   Discharge Reassessment   Assessment Type Discharge Planning Reassessment   Did the patient's condition or plan change since previous assessment? No   Discharge Plan  discussed with: Patient   Discharge Plan A Home;Home Health   Discharge Plan B Home with family;Home Health   DME Needed Upon Discharge  other (see comments)  ([facility])   Discharge Barriers Identified None   Why the patient remains in the hospital Requires continued medical care   Post-Acute Status   Post-Acute Authorization HME   HME Status Pending payor review   Home Health Status Pending medical clearance/testing   Hospital Resources/Appts/Education Provided Appointments scheduled by Navigator/Coordinator   Discharge Delays None known at this time     Effie Mcgill RN    [phone]

## 2023-03-30 NOTE — PLAN OF CARE
"Patient is awake and alert.  Has complaints of pain and pain medications given per MAR.  States, "they will come tomorrow to refill the Dilaudid pump"  intrathecally.  Suprapubic catheter intact, patent and draining clear yellow urine.  Bilateral lower extremities are edematous.  Safety maintained.  Will continue to monitor.  "

## 2023-03-30 NOTE — PT/OT/SLP PROGRESS
Physical Therapy Treatment    Patient Name:  Tasha Hawley   MRN:  [id number]    Recommendations:     Discharge Recommendations: home health PT  Discharge Equipment Recommendations: none  Barriers to discharge:  decreased mobility,strength and endurance    Assessment:     Tasha Hawley is a 67 y.o. female admitted with a medical diagnosis of Acute respiratory failure with hypoxia and hypercarbia.  She presents with the following impairments/functional limitations: weakness, impaired endurance, impaired functional mobility, gait instability, impaired balance, decreased lower extremity function, decreased safety awareness, pain, decreased ROM, impaired coordination, impaired skin, impaired cardiopulmonary response to activity,pt with good participation with pain limitations,pt will benefit from  services upon discharge to address above functional limitations.    Rehab Prognosis: Fair; patient would benefit from acute skilled PT services to address these deficits and reach maximum level of function.    Recent Surgery: * No surgery found *      Plan:     During this hospitalization, patient to be seen 3 x/week to address the identified rehab impairments via gait training, therapeutic activities, neuromuscular re-education, therapeutic exercises and progress toward the following goals:    Plan of Care Expires:  04/27/23    Subjective     Chief Complaint: back pain  Patient/Family Comments/goals: pt agreeable to up in chair.  Pain/Comfort:  Pain Rating 1: 6/10  Location - Orientation 1: lower  Location 1: back  Pain Addressed 1: Reposition, Distraction, Nurse notified  Pain Rating Post-Intervention 1: 9/10      Objective:     Communicated with nsg prior to session.  Patient found supine with meadows catheter, oxygen, peripheral IV, telemetry upon PT entry to room.     General Precautions: Standard, fall  Orthopedic Precautions: N/A  Braces: N/A  Respiratory Status: Nasal cannula, flow 2 L/min     Functional  Mobility:  Bed Mobility:     Supine to Sit: supervision  Transfers:     Sit to Stand:  supervision and X 3 trials with rolling walker  Bed to Chair: stand by assistance with  rolling walker  using  ambulation   Gait: amb ~35' X 2 with RW and O2 donned with flexed posture and v/c's X 1 standing rest  Balance: fair standing balance       AM-PAC 6 CLICK MOBILITY  Turning over in bed (including adjusting bedclothes, sheets and blankets)?: 4  Sitting down on and standing up from a chair with arms (e.g., wheelchair, bedside commode, etc.): 3  Moving from lying on back to sitting on the side of the bed?: 4  Moving to and from a bed to a chair (including a wheelchair)?: 3  Need to walk in hospital room?: 3  Climbing 3-5 steps with a railing?: 1  Basic Mobility Total Score: 18       Treatment & Education: le seated ex's X 5-7 reps,rest periods PRN,pt's needs and requests met.      Patient left up in chair with all lines intact, call button in reach, and nsg notified..    GOALS: see general POC  Multidisciplinary Problems       Physical Therapy Goals          Problem: Physical Therapy    Goal Priority Disciplines Outcome Goal Variances Interventions   Physical Therapy Goal     PT, PT/OT Ongoing, Progressing     Description: Goals to be met by:  23    Patient will increase functional independence with mobility by performin. Supine to sit with Modified [place]  2. Sit to supine with Modified [place]  3. Sit to stand transfer with Supervision  4. Bed to chair transfer with Supervision using Rolling Walker  5. Gait  x 100 feet with Supervision using Rolling Walker.                          Time Tracking:     PT Received On: 23  PT Start Time: 823     PT Stop Time: 903  PT Total Time (min): 40 min     Billable Minutes: Gait Training 17, Therapeutic Activity 12, and Therapeutic Exercise 11    Treatment Type: Treatment  PT/PTA: PTA     Number of PTA visits since last PT visit: 2     2023

## 2023-03-30 NOTE — ASSESSMENT & PLAN NOTE
Patient with Hypoxic Respiratory failure which is Acute.  she is not on home oxygen. Supplemental oxygen was provided and noted- Oxygen Concentration (%):  [40] 40.   Signs/symptoms of respiratory failure include- shortness of breath.     On 2 L oxygen via NC saturating 96%.  Suspect dyspnea and hypoxia multifactorial in setting of atelectasis, ?Mild volume overload (BNP maybe falsely low in obesity)    CTA chest ruled out PE, no large focal consolidation noted.  Procalcitonin negative less suggestive of pneumonia.  CT chest showed emphysema.  Not on home inhalers.  Suspect obstructive airway disease.  DuoNebs on back order.  Add Spiriva for now.  P.r.n. albuterol.  ABG showed pCO2 64, based on pH this appears to be chronic.  Per discussion with Pulmonary fellow, she would meet criteria for outpatient BiPAP use.      Incentive spirometer.  Increase mobility. Suspect subjective dyspnea may be in setting of volume overload +/- deconditioning or a systemic problem.  Patient reported lightheadedness while working with physical therapy which could be in setting of orthostasis as she was on suboptimal steroid dosing for adrenal insufficiency.  On chart review, discharged 01/2023 on total hydrocortisone 15 mg q.d..  Fludrocortisone added.  Also noted on chart review, last TSH in 01/2023 was 51.  Repeat TSH elevated to 41 but free T4 within normal limits.    s/p IV diuresis, continue. Incentive spirometer, PT, encourage mobilization.  BiPAP q.h.s.    Pulmonary consult given patient has persistent subjective dyspnea  - multifactorial due to chronic aspiration, volume overload, hypothyroidism  - CM obtaining NIV, should have at time of discharge

## 2023-03-30 NOTE — PHYSICIAN QUERY
PT Name: Tasha Hawley  MR #: [id number]    DOCUMENTATION CLARIFICATION     CDS: Grant Moy RN CCDS               Contact information: [email or web address]    This form is a permanent document in the medical record.    Query Date: March 30, 2023      By submitting this query, we are merely seeking further clarification of documentation.  Please utilize your independent clinical judgment when addressing the question(s) below.    The Medical Record reflects the following:    Clinical Information Location in Medical Record   Admitted to  service for CHF exacerbation of unclear provocation with concern for adrenal crisis (I/s/o insufficient chronic steroid dosing) effectively ruled out by endo workup. Palliative consulted for control of breakthrough leg pains.  Resolving CHF is most compelling as the etiology of pt's acute on chronic pain.    Endocrine review.  The adrenal tests :ACTH and cortisol are normal in spite of the fact she is on steroid replacement therapy,Her thyroid medication was adjusted , the hypokalemia could be due to lasix and steroid therapy, the patient remains hypotensive.    Pulmonary consult given patient has persistent subjective dyspnea  - multifactorial due to chronic aspiration, volume overload, hypothyroidism  Still having some shortness of breath and states that her legs are still swollen, although this is improving.    unclear etiology:multifactorial - related to hypothyroidism +/- diastolic heart failure      03/20/23 03:58 03/22/23 19:46   BNP 44 44      BNP negative but could be falsely low in setting of morbid obesity. On chart review, appears patient was suspected to have chronic venous insufficiency and chronic lower extremity edema.  On exam patient has nonpitting bilateral lower extremity edema with mild erythema, skin changes suggesting chronicity.      shortness of breath. 2-3+ pitting edema bilaterally to the lower extremities with overlying redness, warmth and  tenderness. Concerned for fluid overload     Rales present.   Lymphedema of both lower extremities  Ultrasound negative for DVT  Body mass index is 33.91 kg/m²    furosemide 80 mg IV x 1  furosemide 20 mg IV x 1  furosemide 20 mg IV x 1  furosemide 20 mg IV q12h  furosemide 40 mg PO daily  furosemide 20 mg IV daily  furosemide 20 mg IV BID  furosemide 20 mg IV x 1  furosemide 40 mg IV BID 3/28/2023 Palliative consult          3/28/2023 Endocrinology progress notes        3/28/2023 Hospital Medicine progress notes          3/27/2023 Physician Query    Lab values         3/23/2023 H&P          3/22/2023 ED provider notes     3/24/2023 Hospital Medicine progress notes          3/22/2023 Medication  3/23/2023 Medication  3/24/2023 Medication  3/25/2023 Medication  3/26-3/27/2023 Medication  3/27-3/28/2023 Medication  3/28-3/29/2023 Medication  3/29/2023 Medication  3/29/2023- Current Medication       Please clarify/confirm the Consultants diagnosis of CHF exacerbation:     [  x ] Diagnosis ruled in; acute on chronic diastolic heart failure   [   ] Diagnosis ruled out   [   ] Other diagnosis (please specify): _____________________________   [  ] Clinically undetermined

## 2023-03-30 NOTE — PT/OT/SLP PROGRESS
Occupational Therapy   Treatment    Name: Tasha Hawley  MRN: [id number]  Admitting Diagnosis:  Acute respiratory failure with hypoxia and hypercarbia       Recommendations:     Discharge Recommendations: other (see comments) (post acute therapy)  Discharge Equipment Recommendations:  none  Barriers to discharge:  Decreased caregiver support    Assessment:     Tasha Hawley is a 67 y.o. female with a medical diagnosis of Acute respiratory failure with hypoxia and hypercarbia.  She presents with ...The primary encounter diagnosis was Heart failure, unspecified HF chronicity, unspecified heart failure type. Diagnoses of Acute hypoxemic respiratory failure, Shortness of breath, Swelling, Heart burn, Chronic diastolic heart failure, SOB (shortness of breath), Acute respiratory failure with hypoxia and hypercarbia, and Chest pain were also pertinent to this visit.  . Performance deficits affecting function are weakness, impaired endurance, impaired sensation, impaired self care skills, impaired functional mobility, gait instability, decreased lower extremity function, decreased coordination, impaired cognition, impaired balance, decreased safety awareness, impaired skin, pain, edema, impaired cardiopulmonary response to activity.     Continue OT POC. Patient with increased fatigue on this session (stating exhaustion from AM PT session), but less tender to touch on BLE which allows for increased engagement in LB ADLs. Patient with one significant LOB during functional mobility requiring moderate assist to correct. Note to follow. Recommend consideration of post acute therapy upon medically stable - concern for immediate transition to home due to limited assist available secondary due to patient's self reported concerns for her spouse whom she states has memory deficits.    Rehab Prognosis:  Fair; patient would benefit from acute skilled OT services to address these deficits and reach maximum level of function.        Plan:     Patient to be seen 4 x/week to address the above listed problems via self-care/home management, therapeutic activities, therapeutic exercises  Plan of Care Expires: 04/21/23  Plan of Care Reviewed with: patient    Subjective     Chief Complaint: Patient complaining of fatigue from earlier physical therapy session  Patient/Family Comments/goals: Patient indicating missing her abilities in her earlier life such as fishing and more.  Pain/Comfort:  Pain Rating 1: other (see comments) (moderate pain in BLE and back at rest)  Pain Addressed 1: Reposition, Distraction, Cessation of Activity, Nurse notified  Pain Rating Post-Intervention 1: other (see comments) (increased pain in BLE with movement relieved by reposition)    Objective:     Communicated with: nursing prior to session.  Patient found HOB elevated with bed alarm, oxygen, meadows catheter, peripheral IV upon OT entry to room.    General Precautions: Standard, fall    Orthopedic Precautions:N/A  Braces: N/A  Respiratory Status: nasal cannula 1.5L     Occupational Performance:     Bed Mobility:    Patient completed Supine to Sit with supervision, instructions for gentle transitions  Patient completed Sit to Supine with minimum assistance with increased need for assist due to fatigue    Functional Mobility/Transfers:  Patient completed Sit <> Stand Transfer with supervision assist x2 trials, min assist x1 trial  with  rolling walker   Functional Mobility: 2 trials. Trial 1: brief functional mobility from bed to door and return to bed with rolling walker, slow steps, supervision assist initially followed by moderate assist to prevent lateral loss of balance when turning requiring therapist intervention. Seated rest break. Trial 2: addition in room functional mobility from bed to door and return to bed with rolling walker, CGA, and necessity for therapist to pull chair out and place it behind patient for unplanned seated rest break due to fatigue  followed by return to bed.    Activities of Daily Living:  Lower Body Dressing: moderate assistance ; able to tolerate touching BLE on this date and with guided leaning laterally method from bed level patient able to assist in clothing management   Education on bathing transfer tub bench: Patient self reporting preference for taking soaking baths in home. Patient owns a tub transfer bench but reports not liking it due to water on the floor. Educated on collaborative communication with patient (as well as patient's friend whom briefly in room; consent provided from patient) regarding strategy of cutting shower liner (cheap liner from [employer] as innermost layer) and folding under buttocks once seated on bench to prevent water on floor. Teachback understanding provided by patient and patient's family friend.      [facility] 6 Click ADL: 18    Treatment & Education:  Patient reporting fatigue but agreeable for participation. First third of session emphasis on self care training as above (LB dressing, education on TTB and modifications to support improved ease of use). SpO2 monitored throughout session and WFL on 1.5L.   Guided for functional mobility tasks as above. Reeducation on purpose of functional mobility training in effort to enable increased likelihood to meet eventual goal of returning to home and mobilizing safely. 3 stands performed. Increased assist on 3rd stand due to fatigue. Two trials of functional mobility completed as above. At end of session, elevated BLE, all needs in reach.    Patient left HOB elevated with all lines intact, call button in reach, bed alarm on, and nursing notified    GOALS:   Multidisciplinary Problems       Occupational Therapy Goals          Problem: Occupational Therapy    Goal Priority Disciplines Outcome Interventions   Occupational Therapy Goal     OT, PT/OT Ongoing, Progressing    Description: Goals to be met by: 4/21/2023     Patient will increase functional independence  with ADLs by performing:    UE Dressing with Set-up Assistance with overhead clothing without complaints of increased lumbar pain  LE Dressing with Minimal Assistance to return to PLOF with incorporation of improved body mechanics (ie. Avoid trunk twisting 2/2 hx of back pain)  Toileting from toilet with Stand-by Assistance for hygiene and clothing management after bowel movement.   Toilet transfer to toilet with Stand-by Assistance with incorporation of body alignment principles.  Standing x 3 minutes with BUE support as needed, SBA for balance, and without adverse change in vital signs or reports of dizziness  Functional mobility in room to access bathroom / closet area with SBA, use of least restrictive assistive device, and incorporated energy conservation techniques.                         Time Tracking:     OT Date of Treatment: 03/30/23  OT Start Time: 1455  OT Stop Time: 1538  OT Total Time (min): 43 min    Billable Minutes:Self Care/Home Management 13 min  Therapeutic Activity 30 min    OT/SAMANTHA: OT          3/30/2023

## 2023-03-30 NOTE — PROGRESS NOTES
[facility] Medicine  Progress Note    Patient Name: Tasha Hawley  MRN: [id number]  Patient Class: IP- Inpatient   Admission Date: 3/22/2023  Length of Stay: 6 days  Attending Physician: Nic Mistry, *  Primary Care Provider: Mesfin Hodges Ii, MD        Subjective:     Principal Problem:Acute respiratory failure with hypoxia and hypercarbia        HPI:  67 year old female with a history of CHF, cirrhosis, meadows dependence presents with shortness of breath, myalgias, and lower extremity edema. The swelling has worsened and now there is redness. She says she does not wear oxygen at home. No chest pain. Reports recently seen at [facility] for UTI and was started on dicloxacillin. She says she had diarrhea as well but this was before the abx initiation.  UA appeared grossly infected 3 days ago however no growth to date on urine culture.  Patient with lower extremity edema.  Given 80 mg Lasix in ED. chest x-ray with interstitial edema.  Patient newly on O2 requirements.  Troponin negative.  BNP negative.  Patient will be admitted to Hospital Medicine      Overview/Hospital Course:  No notes on file    Interval History:  Bilateral lower extremity , but cellulitis does appear to be improving.  Switched to oral antibiotic and will continue.  Still with significant hypoxia and feeling short of breath.  O2 sats are in normal range however; will attempt to wean off supplemental oxygen today and obtain fan for bedside.  Encouraging work with PT/OT.    Review of Systems   Constitutional:  Positive for fatigue.   Respiratory:  Positive for shortness of breath.    Cardiovascular:  Positive for leg swelling.   Neurological:  Negative for light-headedness.   Objective:     Vital Signs (Most Recent):  Temp: 98.2 °F (36.8 °C) (03/30/23 1137)  Pulse: (!) 56 (03/30/23 1215)  Resp: 18 (03/30/23 1206)  BP: (!) 101/51 (03/30/23 1137)  SpO2: 96 % (03/30/23 1206)   Vital Signs (24h Range):  Temp:   [96.6 °F (35.9 °C)-98.2 °F (36.8 °C)] 98.2 °F (36.8 °C)  Pulse:  [50-89] 56  Resp:  [16-20] 18  SpO2:  [95 %-99 %] 96 %  BP: ()/(50-60) 101/51     Weight: 100.4 kg (221 lb 5.5 oz)  Body mass index is 34.67 kg/m².    Intake/Output Summary (Last 24 hours) at 3/30/2023 1416  Last data filed at 3/30/2023 0917  Gross per 24 hour   Intake 120 ml   Output 4050 ml   Net -3930 ml        Physical Exam  Vitals and nursing note reviewed.   Constitutional:       General: She is not in acute distress.     Appearance: She is ill-appearing.   Eyes:      General: No scleral icterus.  Cardiovascular:      Rate and Rhythm: Regular rhythm. Bradycardia present.      Pulses: Normal pulses.      Heart sounds: Normal heart sounds. No murmur heard.  Pulmonary:      Effort: Pulmonary effort is normal. No respiratory distress.      Breath sounds: No wheezing.   Abdominal:      Palpations: Abdomen is soft.      Tenderness: There is no abdominal tenderness.   Musculoskeletal:      Right lower leg: Edema present.      Left lower leg: Edema present.      Comments: Bilateral lower extremity erythema and tenderness on palpation, no palpable abscess or open wound  Lower extremity edema, redness improved   Skin:     General: Skin is warm and dry.      Findings: No bruising.   Neurological:      Mental Status: She is alert and oriented to person, place, and time.       Significant Labs: All pertinent labs within the past 24 hours have been reviewed.    Significant Imaging: I have reviewed all pertinent imaging results/findings within the past 24 hours.      Assessment/Plan:      * Acute respiratory failure with hypoxia and hypercarbia  Patient with Hypoxic Respiratory failure which is Acute.  she is not on home oxygen. Supplemental oxygen was provided and noted- Oxygen Concentration (%):  [40] 40.   Signs/symptoms of respiratory failure include- shortness of breath.     On 2 L oxygen via NC saturating 96%.  Suspect dyspnea and hypoxia  multifactorial in setting of atelectasis, ?Mild volume overload (BNP maybe falsely low in obesity)    CTA chest ruled out PE, no large focal consolidation noted.  Procalcitonin negative less suggestive of pneumonia.  CT chest showed emphysema.  Not on home inhalers.  Suspect obstructive airway disease.  DuoNebs on back order.  Add Spiriva for now.  P.r.n. albuterol.  ABG showed pCO2 64, based on pH this appears to be chronic.  Per discussion with Pulmonary fellow, she would meet criteria for outpatient BiPAP use.      Incentive spirometer.  Increase mobility. Suspect subjective dyspnea may be in setting of volume overload +/- deconditioning or a systemic problem.  Patient reported lightheadedness while working with physical therapy which could be in setting of orthostasis as she was on suboptimal steroid dosing for adrenal insufficiency.  On chart review, discharged 01/2023 on total hydrocortisone 15 mg q.d..  Fludrocortisone added.  Also noted on chart review, last TSH in 01/2023 was 51.  Repeat TSH elevated to 41 but free T4 within normal limits.    s/p IV diuresis, continue. Incentive spirometer, PT, encourage mobilization.  BiPAP q.h.s.    Pulmonary consult given patient has persistent subjective dyspnea  - multifactorial due to chronic aspiration, volume overload, hypothyroidism  - CM obtaining NIV, should have at time of discharge    Adrenal insufficiency  Given extreme fatigue, hypotension-consulted endocrinology for adrenal insufficiency    -continue hydrocortisone 10/5 and fludrocortisone 100    UTI (urinary tract infection)  Last urine culture 03/20/2023 was negative.  UA this hospital stay did not show significant pyuria.  Patient has had several previous urine cultures with staph but also has a suprapubic catheter which may be colonized    Per patient she is supposed to be on oral dicloxacillin at home.  This drug is not available on formulary in this hospital and therefore was switched to Keflex.  Given  concern for possible cellulitis of lower extremity, will do ceftriaxone for now but do not think UTI needs to be treated at this time.  Blood cultures 3/23 show no growth till date    Debility  Deconditioned with almost bed-bound status  PT/OT      S/P insertion of intrathecal pump  Follows with pain clinic outpatient   Per patient change of pump due on 03/30/2023      Lymphedema of both lower extremities  Ultrasound negative for DVT  Given 80 mg Lasix in ED  Continue gentle diuresis; on lasix 20mg bid    Redness, tenderness and swelling suggestive of cellulitis the patient does not have systemic signs of infection.  She is chronically on steroids and therefore may not mount a strong immune response.  Procalcitonin low is reassuring.  Given recurrence of erythema and severe leg pain (difficult to gauge pain given history of chronic pain syndrome) -had switch to cefazolin; appears to be improving for now  -transition to Keflex today    Primary hypothyroidism  Last TSH 51.8 in 01/2023, TSH remains elevated at 41 but free T4 normal    Increased synthroid to 150mcg; added cytomel 25mg qd              Neurogenic bladder  S/p suprapubic catheter      GERD (gastroesophageal reflux disease)  -continue daily PPI      SOB (shortness of breath)  As above      Chronic pain syndrome  With intrathecal Dilaudid pump in place   Continue home dose p.r.n. Norco, Tylenol    Per patient back pain is chronic due to multiple past surgeries but patient reported worsening pain while working with PT and history of falls    Thoracolumbar spine x-ray shows no acute fracture or displacement. Okay to work with PT/OT    Palliative care consult for chronic pain and overall goals of care  - added pregabalin and increased norco 3/28      Sleep apnea  -would benefit from noninvasive ventilation   -ordered      VTE Risk Mitigation (From admission, onward)         Ordered     enoxaparin injection 40 mg  Daily         03/23/23 0156     IP VTE HIGH RISK  PATIENT  Once         03/23/23 0156                Discharge Planning   JACKIE: 3/27/2023     Code Status: Full Code   Is the patient medically ready for discharge?:     Reason for patient still in hospital (select all that apply): Patient trending condition and Treatment  Discharge Plan A: Home, Home Health   Discharge Delays: None known at this time              Nic Mistry MD  Department of Hospital Medicine   [facility]

## 2023-03-30 NOTE — PLAN OF CARE
Patient on oxygen in no apparent distress, given MDI and IS treatment, no adverse reactions will continue to monitor.

## 2023-03-31 ENCOUNTER — PATIENT OUTREACH (OUTPATIENT)
Dept: ADMINISTRATIVE | Facility: OTHER | Age: 68
End: 2023-03-31
Payer: MEDICARE

## 2023-03-31 LAB
ANION GAP SERPL CALC-SCNC: 9 MMOL/L (ref 8–16)
BUN SERPL-MCNC: 18 MG/DL (ref 8–23)
CALCIUM SERPL-MCNC: 8.6 MG/DL (ref 8.7–10.5)
CHLORIDE SERPL-SCNC: 94 MMOL/L (ref 95–110)
CO2 SERPL-SCNC: 35 MMOL/L (ref 23–29)
CREAT SERPL-MCNC: 1 MG/DL (ref 0.5–1.4)
EST. GFR  (NO RACE VARIABLE): >60 ML/MIN/1.73 M^2
GLUCOSE SERPL-MCNC: 87 MG/DL (ref 70–110)
POTASSIUM SERPL-SCNC: 4.3 MMOL/L (ref 3.5–5.1)
SODIUM SERPL-SCNC: 138 MMOL/L (ref 136–145)

## 2023-03-31 PROCEDURE — 25000003 PHARM REV CODE 250: Mod: HCNC | Performed by: HOSPITALIST

## 2023-03-31 PROCEDURE — 25000003 PHARM REV CODE 250: Mod: HCNC

## 2023-03-31 PROCEDURE — 63600175 PHARM REV CODE 636 W HCPCS: Mod: HCNC | Performed by: HOSPITALIST

## 2023-03-31 PROCEDURE — 94761 N-INVAS EAR/PLS OXIMETRY MLT: CPT | Mod: HCNC

## 2023-03-31 PROCEDURE — 80048 BASIC METABOLIC PNL TOTAL CA: CPT | Mod: HCNC | Performed by: HOSPITALIST

## 2023-03-31 PROCEDURE — 25000003 PHARM REV CODE 250: Mod: HCNC | Performed by: REGISTERED NURSE

## 2023-03-31 PROCEDURE — 94660 CPAP INITIATION&MGMT: CPT | Mod: HCNC

## 2023-03-31 PROCEDURE — 63600175 PHARM REV CODE 636 W HCPCS: Mod: HCNC | Performed by: REGISTERED NURSE

## 2023-03-31 PROCEDURE — 94799 UNLISTED PULMONARY SVC/PX: CPT | Mod: HCNC

## 2023-03-31 PROCEDURE — 94640 AIRWAY INHALATION TREATMENT: CPT | Mod: HCNC

## 2023-03-31 PROCEDURE — 25000003 PHARM REV CODE 250: Mod: HCNC | Performed by: NURSE PRACTITIONER

## 2023-03-31 PROCEDURE — 36415 COLL VENOUS BLD VENIPUNCTURE: CPT | Mod: HCNC | Performed by: HOSPITALIST

## 2023-03-31 PROCEDURE — 25000003 PHARM REV CODE 250: Mod: HCNC | Performed by: INTERNAL MEDICINE

## 2023-03-31 PROCEDURE — 25000003 PHARM REV CODE 250: Mod: HCNC | Performed by: STUDENT IN AN ORGANIZED HEALTH CARE EDUCATION/TRAINING PROGRAM

## 2023-03-31 PROCEDURE — 99900035 HC TECH TIME PER 15 MIN (STAT): Mod: HCNC

## 2023-03-31 PROCEDURE — 11000001 HC ACUTE MED/SURG PRIVATE ROOM: Mod: HCNC

## 2023-03-31 RX ORDER — PREGABALIN 50 MG/1
50 CAPSULE ORAL 2 TIMES DAILY
Qty: 60 CAPSULE | Refills: 6 | Status: CANCELLED | OUTPATIENT
Start: 2023-03-31 | End: 2023-09-29

## 2023-03-31 RX ORDER — LORAZEPAM 0.5 MG/1
0.5 TABLET ORAL ONCE
Status: COMPLETED | OUTPATIENT
Start: 2023-03-31 | End: 2023-03-31

## 2023-03-31 RX ORDER — TRAZODONE HYDROCHLORIDE 100 MG/1
100 TABLET ORAL NIGHTLY
Status: DISCONTINUED | OUTPATIENT
Start: 2023-03-31 | End: 2023-04-01

## 2023-03-31 RX ORDER — TRAZODONE HYDROCHLORIDE 50 MG/1
50 TABLET ORAL NIGHTLY
Qty: 30 TABLET | Refills: 11 | Status: CANCELLED | OUTPATIENT
Start: 2023-03-31 | End: 2024-03-30

## 2023-03-31 RX ADMIN — CEPHALEXIN 500 MG: 500 CAPSULE ORAL at 10:03

## 2023-03-31 RX ADMIN — POTASSIUM BICARBONATE 20 MEQ: 391 TABLET, EFFERVESCENT ORAL at 08:03

## 2023-03-31 RX ADMIN — HYDROCORTISONE 10 MG: 5 TABLET ORAL at 08:03

## 2023-03-31 RX ADMIN — FLUOXETINE HYDROCHLORIDE 60 MG: 20 CAPSULE ORAL at 08:03

## 2023-03-31 RX ADMIN — ASPIRIN 81 MG: 81 TABLET, COATED ORAL at 08:03

## 2023-03-31 RX ADMIN — LEVOTHYROXINE SODIUM 150 MCG: 25 TABLET ORAL at 06:03

## 2023-03-31 RX ADMIN — LORAZEPAM 0.5 MG: 0.5 TABLET ORAL at 01:03

## 2023-03-31 RX ADMIN — HYDROCORTISONE 5 MG: 5 TABLET ORAL at 05:03

## 2023-03-31 RX ADMIN — FLUDROCORTISONE ACETATE 100 MCG: 0.1 TABLET ORAL at 08:03

## 2023-03-31 RX ADMIN — QUETIAPINE FUMARATE 200 MG: 100 TABLET ORAL at 08:03

## 2023-03-31 RX ADMIN — CEPHALEXIN 500 MG: 500 CAPSULE ORAL at 01:03

## 2023-03-31 RX ADMIN — HYDROXYZINE HYDROCHLORIDE 25 MG: 25 TABLET ORAL at 08:03

## 2023-03-31 RX ADMIN — ENOXAPARIN SODIUM 40 MG: 40 INJECTION SUBCUTANEOUS at 05:03

## 2023-03-31 RX ADMIN — HYDROCODONE BITARTRATE AND ACETAMINOPHEN 1 TABLET: 10; 325 TABLET ORAL at 04:03

## 2023-03-31 RX ADMIN — HYDROCODONE BITARTRATE AND ACETAMINOPHEN 1 TABLET: 10; 325 TABLET ORAL at 05:03

## 2023-03-31 RX ADMIN — FUROSEMIDE 40 MG: 10 INJECTION INTRAMUSCULAR; INTRAVENOUS at 04:03

## 2023-03-31 RX ADMIN — ATORVASTATIN CALCIUM 80 MG: 40 TABLET, FILM COATED ORAL at 08:03

## 2023-03-31 RX ADMIN — TIOTROPIUM BROMIDE INHALATION SPRAY 2 PUFF: 3.12 SPRAY, METERED RESPIRATORY (INHALATION) at 09:03

## 2023-03-31 RX ADMIN — CEPHALEXIN 500 MG: 500 CAPSULE ORAL at 06:03

## 2023-03-31 RX ADMIN — FUROSEMIDE 40 MG: 10 INJECTION INTRAMUSCULAR; INTRAVENOUS at 05:03

## 2023-03-31 RX ADMIN — TRAZODONE HYDROCHLORIDE 100 MG: 100 TABLET ORAL at 08:03

## 2023-03-31 RX ADMIN — LIOTHYRONINE SODIUM 25 MCG: 25 TABLET ORAL at 08:03

## 2023-03-31 RX ADMIN — PANTOPRAZOLE SODIUM 40 MG: 40 TABLET, DELAYED RELEASE ORAL at 08:03

## 2023-03-31 NOTE — ASSESSMENT & PLAN NOTE
Ultrasound negative for DVT  Given 80 mg Lasix in ED  Continue gentle diuresis; on lasix 40mg bid    Redness, tenderness and swelling suggestive of cellulitis the patient does not have systemic signs of infection.  She is chronically on steroids and therefore may not mount a strong immune response.  Procalcitonin low is reassuring.  Given recurrence of erythema and severe leg pain (difficult to gauge pain given history of chronic pain syndrome) -had switch to cefazolin; appears to be improving for now  -transitioned to Keflex

## 2023-03-31 NOTE — SUBJECTIVE & OBJECTIVE
Interval History:  Reports that she is feeling horrible today with new onset tremors.  Reviewed medications and could potentially be secondary to pregabalin; will hold today.  She is been diuresing well.  Still describes significant lower extremity pain; difficult to gauge difference between this and her chronic pain.  Appears to be close to baseline terms of pain.  Increasing trazodone at night.  Will see if patient qualifies for home oxygen at time of discharge.    Review of Systems   Constitutional:  Positive for fatigue.   Respiratory:  Positive for shortness of breath.    Cardiovascular:  Positive for leg swelling.   Neurological:  Positive for tremors. Negative for light-headedness.   Objective:     Vital Signs (Most Recent):  Temp: 98.4 °F (36.9 °C) (03/31/23 0905)  Pulse: (!) 55 (03/31/23 1156)  Resp: 20 (03/31/23 0924)  BP: (!) 110/54 (03/31/23 0905)  SpO2: (!) 94 % (03/31/23 0924)   Vital Signs (24h Range):  Temp:  [97.5 °F (36.4 °C)-98.6 °F (37 °C)] 98.4 °F (36.9 °C)  Pulse:  [54-67] 55  Resp:  [16-20] 20  SpO2:  [92 %-97 %] 94 %  BP: ()/(52-70) 110/54     Weight: 100.4 kg (221 lb 5.5 oz)  Body mass index is 34.67 kg/m².    Intake/Output Summary (Last 24 hours) at 3/31/2023 1228  Last data filed at 3/31/2023 0856  Gross per 24 hour   Intake 390 ml   Output 1350 ml   Net -960 ml        Physical Exam  Vitals and nursing note reviewed.   Constitutional:       General: She is not in acute distress.     Appearance: She is ill-appearing.   Eyes:      General: No scleral icterus.  Cardiovascular:      Rate and Rhythm: Regular rhythm. Bradycardia present.      Pulses: Normal pulses.      Heart sounds: Normal heart sounds. No murmur heard.  Pulmonary:      Effort: Pulmonary effort is normal. No respiratory distress.      Breath sounds: No wheezing.   Abdominal:      Palpations: Abdomen is soft.      Tenderness: There is no abdominal tenderness.   Musculoskeletal:      Right lower leg: Edema present.       Left lower leg: Edema present.      Comments: Bilateral lower extremity erythema and tenderness on palpation, no palpable abscess or open wound  Lower extremity edema, redness improved   Skin:     General: Skin is warm and dry.      Findings: No bruising.   Neurological:      Mental Status: She is alert and oriented to person, place, and time.      Comments: tremors       Significant Labs: All pertinent labs within the past 24 hours have been reviewed.    Significant Imaging: I have reviewed all pertinent imaging results/findings within the past 24 hours.

## 2023-03-31 NOTE — PROGRESS NOTES
[facility] Medicine  Progress Note    Patient Name: Tasha Hawley  MRN: [id number]  Patient Class: IP- Inpatient   Admission Date: 3/22/2023  Length of Stay: 7 days  Attending Physician: Nic Mistry, *  Primary Care Provider: Mesfin Hodges Ii, MD        Subjective:     Principal Problem:Acute respiratory failure with hypoxia and hypercarbia        HPI:  67 year old female with a history of CHF, cirrhosis, meadows dependence presents with shortness of breath, myalgias, and lower extremity edema. The swelling has worsened and now there is redness. She says she does not wear oxygen at home. No chest pain. Reports recently seen at [facility] for UTI and was started on dicloxacillin. She says she had diarrhea as well but this was before the abx initiation.  UA appeared grossly infected 3 days ago however no growth to date on urine culture.  Patient with lower extremity edema.  Given 80 mg Lasix in ED. chest x-ray with interstitial edema.  Patient newly on O2 requirements.  Troponin negative.  BNP negative.  Patient will be admitted to Hospital Medicine      Overview/Hospital Course:  No notes on file    Interval History:  Reports that she is feeling horrible today with new onset tremors.  Reviewed medications and could potentially be secondary to pregabalin; will hold today.  She is been diuresing well.  Still describes significant lower extremity pain; difficult to gauge difference between this and her chronic pain.  Appears to be close to baseline terms of pain.  Increasing trazodone at night.  Will see if patient qualifies for home oxygen at time of discharge.    Review of Systems   Constitutional:  Positive for fatigue.   Respiratory:  Positive for shortness of breath.    Cardiovascular:  Positive for leg swelling.   Neurological:  Positive for tremors. Negative for light-headedness.   Objective:     Vital Signs (Most Recent):  Temp: 98.4 °F (36.9 °C) (03/31/23 0905)  Pulse: (!) 55  (03/31/23 1156)  Resp: 20 (03/31/23 0924)  BP: (!) 110/54 (03/31/23 0905)  SpO2: (!) 94 % (03/31/23 0924)   Vital Signs (24h Range):  Temp:  [97.5 °F (36.4 °C)-98.6 °F (37 °C)] 98.4 °F (36.9 °C)  Pulse:  [54-67] 55  Resp:  [16-20] 20  SpO2:  [92 %-97 %] 94 %  BP: ()/(52-70) 110/54     Weight: 100.4 kg (221 lb 5.5 oz)  Body mass index is 34.67 kg/m².    Intake/Output Summary (Last 24 hours) at 3/31/2023 1228  Last data filed at 3/31/2023 0856  Gross per 24 hour   Intake 390 ml   Output 1350 ml   Net -960 ml        Physical Exam  Vitals and nursing note reviewed.   Constitutional:       General: She is not in acute distress.     Appearance: She is ill-appearing.   Eyes:      General: No scleral icterus.  Cardiovascular:      Rate and Rhythm: Regular rhythm. Bradycardia present.      Pulses: Normal pulses.      Heart sounds: Normal heart sounds. No murmur heard.  Pulmonary:      Effort: Pulmonary effort is normal. No respiratory distress.      Breath sounds: No wheezing.   Abdominal:      Palpations: Abdomen is soft.      Tenderness: There is no abdominal tenderness.   Musculoskeletal:      Right lower leg: Edema present.      Left lower leg: Edema present.      Comments: Bilateral lower extremity erythema and tenderness on palpation, no palpable abscess or open wound  Lower extremity edema, redness improved   Skin:     General: Skin is warm and dry.      Findings: No bruising.   Neurological:      Mental Status: She is alert and oriented to person, place, and time.      Comments: tremors       Significant Labs: All pertinent labs within the past 24 hours have been reviewed.    Significant Imaging: I have reviewed all pertinent imaging results/findings within the past 24 hours.      Assessment/Plan:      * Acute respiratory failure with hypoxia and hypercarbia  Patient with Hypoxic Respiratory failure which is Acute.  she is not on home oxygen. Supplemental oxygen was provided and noted- Oxygen Concentration (%):   [40] 40.   Signs/symptoms of respiratory failure include- shortness of breath.     On 2 L oxygen via NC saturating 96%.  Suspect dyspnea and hypoxia multifactorial in setting of atelectasis, ?Mild volume overload (BNP maybe falsely low in obesity)    CTA chest ruled out PE, no large focal consolidation noted.  Procalcitonin negative less suggestive of pneumonia.  CT chest showed emphysema.  Not on home inhalers.  Suspect obstructive airway disease.  DuoNebs on back order.  Add Spiriva for now.  P.r.n. albuterol.  ABG showed pCO2 64, based on pH this appears to be chronic.  Per discussion with Pulmonary fellow, she would meet criteria for outpatient BiPAP use.      Incentive spirometer.  Increase mobility. Suspect subjective dyspnea may be in setting of volume overload +/- deconditioning or a systemic problem.  Patient reported lightheadedness while working with physical therapy which could be in setting of orthostasis as she was on suboptimal steroid dosing for adrenal insufficiency.  On chart review, discharged 01/2023 on total hydrocortisone 15 mg q.d..  Fludrocortisone added.  Also noted on chart review, last TSH in 01/2023 was 51.  Repeat TSH elevated to 41 but free T4 within normal limits.    s/p IV diuresis, continue. Incentive spirometer, PT, encourage mobilization.  BiPAP q.h.s.    Pulmonary consult given patient has persistent subjective dyspnea  - multifactorial due to chronic aspiration, volume overload, hypothyroidism  - CM obtaining NIV, should have at time of discharge    Adrenal insufficiency  Given extreme fatigue, hypotension-consulted endocrinology for adrenal insufficiency    -continue hydrocortisone 10/5 and fludrocortisone 100    UTI (urinary tract infection)  Last urine culture 03/20/2023 was negative.  UA this hospital stay did not show significant pyuria.  Patient has had several previous urine cultures with staph but also has a suprapubic catheter which may be colonized    Per patient she  is supposed to be on oral dicloxacillin at home.  This drug is not available on formulary in this hospital and therefore was switched to Keflex.  Given concern for possible cellulitis of lower extremity, will do ceftriaxone for now but do not think UTI needs to be treated at this time.  Blood cultures 3/23 show no growth till date    Tremor  -likely secondary to pregabalin  -will hold this medication for now; give 1 time dose 0.5 mg lorazepam      Debility  Deconditioned with almost bed-bound status  PT/OT      S/P insertion of intrathecal pump  Follows with pain clinic outpatient   Per patient change of pump due on 03/30/2023      Lymphedema of both lower extremities  Ultrasound negative for DVT  Given 80 mg Lasix in ED  Continue gentle diuresis; on lasix 40mg bid    Redness, tenderness and swelling suggestive of cellulitis the patient does not have systemic signs of infection.  She is chronically on steroids and therefore may not mount a strong immune response.  Procalcitonin low is reassuring.  Given recurrence of erythema and severe leg pain (difficult to gauge pain given history of chronic pain syndrome) -had switch to cefazolin; appears to be improving for now  -transitioned to Keflex     Primary hypothyroidism  Last TSH 51.8 in 01/2023, TSH remains elevated at 41 but free T4 normal    Increased synthroid to 150mcg; added cytomel 25mg qd              Neurogenic bladder  S/p suprapubic catheter      GERD (gastroesophageal reflux disease)  -continue daily PPI      SOB (shortness of breath)  As above      Chronic pain syndrome  With intrathecal Dilaudid pump in place   Continue home dose p.r.n. Norco, Tylenol    Per patient back pain is chronic due to multiple past surgeries but patient reported worsening pain while working with PT and history of falls    Thoracolumbar spine x-ray shows no acute fracture or displacement. Okay to work with PT/OT    Palliative care consult for chronic pain and overall goals of  care  - added pregabalin and increased norco 3/28  - stop lyrica today      Sleep apnea  -would benefit from noninvasive ventilation   -ordered      VTE Risk Mitigation (From admission, onward)         Ordered     enoxaparin injection 40 mg  Daily         03/23/23 0156     IP VTE HIGH RISK PATIENT  Once         03/23/23 0156                Discharge Planning   JACKIE: 3/27/2023     Code Status: Full Code   Is the patient medically ready for discharge?:     Reason for patient still in hospital (select all that apply): Patient new problem and Patient trending condition  Discharge Plan A: Home, Home Health   Discharge Delays: None known at this time              Nic Mistry MD  Department of Hospital Medicine   [facility] S[facility]

## 2023-03-31 NOTE — ASSESSMENT & PLAN NOTE
-likely secondary to pregabalin  -will hold this medication for now; give 1 time dose 0.5 mg lorazepam

## 2023-03-31 NOTE — PROGRESS NOTES
Home Oxygen Evaluation    Date Performed: 3/31/2023    1) Patient's Home O2 Sat on room air, while at rest: 90%        If O2 sats on room air at rest are 88% or below, patient qualifies. No additional testing needed. Document N/A in steps 2 and 3. If 89% or above, complete steps 2.      2) Patient's O2 Sat on room air while exercisin%        If O2 sats on room air while exercising remain 89% or above patient does not qualify, no further testing needed Document N/A in step 3. If O2 sats on room air while exercising are 88% or below, continue to step 3.      3) Patient's O2 Sat while exercising on O2:  93 at   2LPM         (Must show improvement from #2 for patients to qualify)    If O2 sats improve on oxygen, patient qualifies for portable oxygen. If not, the patient does not qualify.

## 2023-03-31 NOTE — NURSING
Patient did NOT take her Potassium last night. She put it on the side to drink but did not take it.

## 2023-03-31 NOTE — NURSING
Medications given per MAR, medicated for pain with prn meds. Telemetry in progress. Ambulated to the bathroom for a BM but did not do anything. SP cath to a  bag putting out good urine output. BIPAP in use for a few hours overnight. Safety maintained, call light in reach, bed alarm in use.

## 2023-03-31 NOTE — PLAN OF CARE
contacted Sanjana with [employer]. I told her patient would be ready to discharge over the weekend (if not today). She stated they are still waiting on insurance auth for Trilogy. However, they will provide patient with a loaner Trilogy at the hospital to take home while she waits for her approved equipment. Follow-Up appointments scheduled. Patient is current with [facility]. Will need orders prior to discharge home. No other DME needs noted. Patient encouraged to call with any questions or concerns.  will continue to follow patient through transitions of care and assist with any discharge needs.     02 walk test to be completed. Nursing staff updated. Patient qualifies for oxygen.    1317--Sanjana with [employer] ([phone]) stated Alphonse (Resp Therapist) has already reach out to patient and . He will coordinate delivery of loaner Trilogy to home with education tomorrow. He has provided patient/family with his contact information for anticipated discharge tomorrow. Please call Sanjana at discharge to notify her.    Ellen with [facility] notified of home oxygen orders to review.    Ellen stated ok to pull E[employer], [facility] will deliver to bedside. She stated have someone reach out to Avis to let her know to set up home delivery tomorrow. Patient notified.    Patient reports she does not need a pain clinic appointment (her dilaudid pump was refilled already).    Patient Contacts    Name Relation Home Work Mobile   Dangelo Hawley Spouse [phone]   Lisa Thompson Daughter   [phone]   Saray Thompson Other   [phone]     Future Appointments   Date Time Provider Department Center   4/4/2023  9:20 AM Mesfin Hodges II, MD [facility] DANY [facility]   4/4/2023  3:00 PM Kaitlynn Caro MD [facility] MELODIE [facility]   4/28/2023  1:40 PM Omaira Henriquez NP [facility] SLEEP [facility]   5/4/2023 11:00 AM Norma Pearson MD [facility] KAMILLA [facility]   5/12/2023  1:00 PM  Cami Romero MD [facility]MS[facility] Thierry [facility]   7/7/2023 11:00 AM Mesfin Hodges II, MD [facility] Thierry Z[facility]as PCW       Relationship Specialty Phone Fax Address Order                [facility]   [phone] [address] [facility] [address]         Next Steps: Follow up  Appointment:   Instructions: [facility] [employer] [facility]  Home Health Services, Home Therapy Services [phone] [address] [facility] [address]         Next Steps: Follow up  Appointment:   Instructions: [email or web address]          03/31/23 1104   Discharge Reassessment   Assessment Type Discharge Planning Reassessment   Did the patient's condition or plan change since previous assessment? No   Discharge Plan discussed with: Patient   Discharge Plan A Home;Home Health   Discharge Plan B Home with family;Home Health   DME Needed Upon Discharge  other (see comments)  (Trilogy)   Discharge Barriers Identified None   Why the patient remains in the hospital Requires continued medical care   Post-Acute Status   Post-Acute Authorization Home Health;HME   HME Status Pending payor review   Home Health Status Pending medical clearance/testing   Hospital Resources/Appts/Education Provided Appointments scheduled by Navigator/Coordinator   Discharge Delays None known at this time     Effie Mcgill RN    [phone]

## 2023-03-31 NOTE — PROGRESS NOTES
O2 delivered, nurse signed due to pt's medical condition. Provided pt with [facility] for any questions or concerns.

## 2023-03-31 NOTE — ASSESSMENT & PLAN NOTE
With intrathecal Dilaudid pump in place   Continue home dose p.r.n. Norco, Tylenol    Per patient back pain is chronic due to multiple past surgeries but patient reported worsening pain while working with PT and history of falls    Thoracolumbar spine x-ray shows no acute fracture or displacement. Okay to work with PT/OT    Palliative care consult for chronic pain and overall goals of care  - added pregabalin and increased norco 3/28  - stop lyrica today

## 2023-03-31 NOTE — PLAN OF CARE
Problem: Adult Inpatient Plan of Care  Goal: Plan of Care Review  Outcome: Ongoing, Progressing     Problem: Fall Injury Risk  Goal: Absence of Fall and Fall-Related Injury  Outcome: Ongoing, Progressing     Problem: Gas Exchange Impaired  Goal: Optimal Gas Exchange  Outcome: Ongoing, Progressing

## 2023-03-31 NOTE — PROGRESS NOTES
RSW met with patient to discuss discharge and additional patient barriers to care. Pt identified no additional social barriers to care. Per pt, pt is not in need of resources at this time.    Damaris Combs, ML

## 2023-04-01 LAB
ANION GAP SERPL CALC-SCNC: 14 MMOL/L (ref 8–16)
ANISOCYTOSIS BLD QL SMEAR: SLIGHT
BASOPHILS # BLD AUTO: 0.02 K/UL (ref 0–0.2)
BASOPHILS NFR BLD: 0.3 % (ref 0–1.9)
BUN SERPL-MCNC: 16 MG/DL (ref 8–23)
CALCIUM SERPL-MCNC: 8.6 MG/DL (ref 8.7–10.5)
CHLORIDE SERPL-SCNC: 96 MMOL/L (ref 95–110)
CO2 SERPL-SCNC: 28 MMOL/L (ref 23–29)
CREAT SERPL-MCNC: 0.8 MG/DL (ref 0.5–1.4)
DIFFERENTIAL METHOD: ABNORMAL
EOSINOPHIL # BLD AUTO: 0.4 K/UL (ref 0–0.5)
EOSINOPHIL NFR BLD: 6.3 % (ref 0–8)
ERYTHROCYTE [DISTWIDTH] IN BLOOD BY AUTOMATED COUNT: 15.4 % (ref 11.5–14.5)
EST. GFR  (NO RACE VARIABLE): >60 ML/MIN/1.73 M^2
GLUCOSE SERPL-MCNC: 88 MG/DL (ref 70–110)
HCT VFR BLD AUTO: 32.6 % (ref 37–48.5)
HGB BLD-MCNC: 9.9 G/DL (ref 12–16)
HYPOCHROMIA BLD QL SMEAR: ABNORMAL
IMM GRANULOCYTES # BLD AUTO: 0.03 K/UL (ref 0–0.04)
IMM GRANULOCYTES NFR BLD AUTO: 0.5 % (ref 0–0.5)
LYMPHOCYTES # BLD AUTO: 1.6 K/UL (ref 1–4.8)
LYMPHOCYTES NFR BLD: 28.2 % (ref 18–48)
MCH RBC QN AUTO: 26.5 PG (ref 27–31)
MCHC RBC AUTO-ENTMCNC: 30.4 G/DL (ref 32–36)
MCV RBC AUTO: 87 FL (ref 82–98)
MONOCYTES # BLD AUTO: 0.6 K/UL (ref 0.3–1)
MONOCYTES NFR BLD: 9.9 % (ref 4–15)
NEUTROPHILS # BLD AUTO: 3.1 K/UL (ref 1.8–7.7)
NEUTROPHILS NFR BLD: 54.8 % (ref 38–73)
NRBC BLD-RTO: 0 /100 WBC
PLATELET # BLD AUTO: 260 K/UL (ref 150–450)
PLATELET BLD QL SMEAR: ABNORMAL
PMV BLD AUTO: 10.3 FL (ref 9.2–12.9)
POTASSIUM SERPL-SCNC: 4.4 MMOL/L (ref 3.5–5.1)
RBC # BLD AUTO: 3.74 M/UL (ref 4–5.4)
SODIUM SERPL-SCNC: 138 MMOL/L (ref 136–145)
WBC # BLD AUTO: 5.74 K/UL (ref 3.9–12.7)

## 2023-04-01 PROCEDURE — 25000003 PHARM REV CODE 250: Mod: HCNC | Performed by: NURSE PRACTITIONER

## 2023-04-01 PROCEDURE — 94799 UNLISTED PULMONARY SVC/PX: CPT | Mod: HCNC

## 2023-04-01 PROCEDURE — 63600175 PHARM REV CODE 636 W HCPCS: Mod: HCNC | Performed by: REGISTERED NURSE

## 2023-04-01 PROCEDURE — 25000003 PHARM REV CODE 250: Mod: HCNC | Performed by: STUDENT IN AN ORGANIZED HEALTH CARE EDUCATION/TRAINING PROGRAM

## 2023-04-01 PROCEDURE — 36415 COLL VENOUS BLD VENIPUNCTURE: CPT | Mod: HCNC | Performed by: HOSPITALIST

## 2023-04-01 PROCEDURE — 25000003 PHARM REV CODE 250: Mod: HCNC | Performed by: INTERNAL MEDICINE

## 2023-04-01 PROCEDURE — 85025 COMPLETE CBC W/AUTO DIFF WBC: CPT | Mod: HCNC | Performed by: HOSPITALIST

## 2023-04-01 PROCEDURE — 25000003 PHARM REV CODE 250: Mod: HCNC | Performed by: HOSPITALIST

## 2023-04-01 PROCEDURE — 25000003 PHARM REV CODE 250: Mod: HCNC | Performed by: REGISTERED NURSE

## 2023-04-01 PROCEDURE — 99900035 HC TECH TIME PER 15 MIN (STAT): Mod: HCNC

## 2023-04-01 PROCEDURE — 25000003 PHARM REV CODE 250: Mod: HCNC

## 2023-04-01 PROCEDURE — 63600175 PHARM REV CODE 636 W HCPCS: Mod: HCNC | Performed by: HOSPITALIST

## 2023-04-01 PROCEDURE — 94640 AIRWAY INHALATION TREATMENT: CPT | Mod: HCNC

## 2023-04-01 PROCEDURE — 94761 N-INVAS EAR/PLS OXIMETRY MLT: CPT | Mod: HCNC

## 2023-04-01 PROCEDURE — 11000001 HC ACUTE MED/SURG PRIVATE ROOM: Mod: HCNC

## 2023-04-01 PROCEDURE — 80048 BASIC METABOLIC PNL TOTAL CA: CPT | Mod: HCNC | Performed by: HOSPITALIST

## 2023-04-01 RX ORDER — LORAZEPAM 0.5 MG/1
0.5 TABLET ORAL EVERY 8 HOURS PRN
Status: DISCONTINUED | OUTPATIENT
Start: 2023-04-01 | End: 2023-04-01

## 2023-04-01 RX ORDER — DRONABINOL 2.5 MG/1
2.5 CAPSULE ORAL 2 TIMES DAILY
Status: DISCONTINUED | OUTPATIENT
Start: 2023-04-01 | End: 2023-04-03 | Stop reason: HOSPADM

## 2023-04-01 RX ORDER — LORAZEPAM 0.5 MG/1
0.5 TABLET ORAL EVERY 6 HOURS PRN
Status: DISCONTINUED | OUTPATIENT
Start: 2023-04-01 | End: 2023-04-03 | Stop reason: HOSPADM

## 2023-04-01 RX ORDER — TRAZODONE HYDROCHLORIDE 100 MG/1
200 TABLET ORAL NIGHTLY
Status: DISCONTINUED | OUTPATIENT
Start: 2023-04-01 | End: 2023-04-02

## 2023-04-01 RX ADMIN — LIOTHYRONINE SODIUM 25 MCG: 25 TABLET ORAL at 09:04

## 2023-04-01 RX ADMIN — FLUOXETINE HYDROCHLORIDE 60 MG: 20 CAPSULE ORAL at 09:04

## 2023-04-01 RX ADMIN — HYDROCORTISONE 5 MG: 5 TABLET ORAL at 05:04

## 2023-04-01 RX ADMIN — FLUDROCORTISONE ACETATE 100 MCG: 0.1 TABLET ORAL at 09:04

## 2023-04-01 RX ADMIN — HYDROCODONE BITARTRATE AND ACETAMINOPHEN 1 TABLET: 10; 325 TABLET ORAL at 12:04

## 2023-04-01 RX ADMIN — QUETIAPINE FUMARATE 200 MG: 100 TABLET ORAL at 09:04

## 2023-04-01 RX ADMIN — TRAZODONE HYDROCHLORIDE 200 MG: 100 TABLET ORAL at 09:04

## 2023-04-01 RX ADMIN — CEPHALEXIN 500 MG: 500 CAPSULE ORAL at 01:04

## 2023-04-01 RX ADMIN — LEVOTHYROXINE SODIUM 150 MCG: 25 TABLET ORAL at 06:04

## 2023-04-01 RX ADMIN — HYDROCODONE BITARTRATE AND ACETAMINOPHEN 1 TABLET: 10; 325 TABLET ORAL at 02:04

## 2023-04-01 RX ADMIN — CEPHALEXIN 500 MG: 500 CAPSULE ORAL at 06:04

## 2023-04-01 RX ADMIN — DRONABINOL 2.5 MG: 2.5 CAPSULE ORAL at 09:04

## 2023-04-01 RX ADMIN — ASPIRIN 81 MG: 81 TABLET, COATED ORAL at 09:04

## 2023-04-01 RX ADMIN — PANTOPRAZOLE SODIUM 40 MG: 40 TABLET, DELAYED RELEASE ORAL at 09:04

## 2023-04-01 RX ADMIN — FUROSEMIDE 40 MG: 10 INJECTION INTRAMUSCULAR; INTRAVENOUS at 04:04

## 2023-04-01 RX ADMIN — TIOTROPIUM BROMIDE INHALATION SPRAY 2 PUFF: 3.12 SPRAY, METERED RESPIRATORY (INHALATION) at 09:04

## 2023-04-01 RX ADMIN — HYDROXYZINE HYDROCHLORIDE 25 MG: 25 TABLET ORAL at 03:04

## 2023-04-01 RX ADMIN — POTASSIUM BICARBONATE 20 MEQ: 391 TABLET, EFFERVESCENT ORAL at 09:04

## 2023-04-01 RX ADMIN — BUTALBITAL, ACETAMINOPHEN, AND CAFFEINE 1 TABLET: 50; 325; 40 TABLET ORAL at 09:04

## 2023-04-01 RX ADMIN — FUROSEMIDE 40 MG: 10 INJECTION INTRAMUSCULAR; INTRAVENOUS at 05:04

## 2023-04-01 RX ADMIN — ATORVASTATIN CALCIUM 80 MG: 40 TABLET, FILM COATED ORAL at 09:04

## 2023-04-01 RX ADMIN — LORAZEPAM 0.5 MG: 0.5 TABLET ORAL at 04:04

## 2023-04-01 RX ADMIN — LORAZEPAM 0.5 MG: 0.5 TABLET ORAL at 09:04

## 2023-04-01 RX ADMIN — HYDROCORTISONE 10 MG: 5 TABLET ORAL at 09:04

## 2023-04-01 RX ADMIN — CEPHALEXIN 500 MG: 500 CAPSULE ORAL at 09:04

## 2023-04-01 RX ADMIN — ENOXAPARIN SODIUM 40 MG: 40 INJECTION SUBCUTANEOUS at 04:04

## 2023-04-01 NOTE — PROGRESS NOTES
[facility] Medicine  Progress Note    Patient Name: Tasha Hawley  MRN: [id number]  Patient Class: IP- Inpatient   Admission Date: 3/22/2023  Length of Stay: 8 days  Attending Physician: iNc Mistry, *  Primary Care Provider: Mesfin Hodges Ii, MD        Subjective:     Principal Problem:Acute respiratory failure with hypoxia and hypercarbia        HPI:  67 year old female with a history of CHF, cirrhosis, meadows dependence presents with shortness of breath, myalgias, and lower extremity edema. The swelling has worsened and now there is redness. She says she does not wear oxygen at home. No chest pain. Reports recently seen at [facility] for UTI and was started on dicloxacillin. She says she had diarrhea as well but this was before the abx initiation.  UA appeared grossly infected 3 days ago however no growth to date on urine culture.  Patient with lower extremity edema.  Given 80 mg Lasix in ED. chest x-ray with interstitial edema.  Patient newly on O2 requirements.  Troponin negative.  BNP negative.  Patient will be admitted to Hospital Medicine      Overview/Hospital Course:  No notes on file    Interval History:  Did not sleep well overnight.  Reports that tremors are still significant at this time.  Still having pain in bilateral lower extremities.  Discussed how difficult it has been to live with her chronic pain how she feels that she will never get back to be able to do what she used to be able to before she had her injury.    Review of Systems   Constitutional:  Positive for fatigue.   Respiratory:  Positive for shortness of breath.    Cardiovascular:  Positive for leg swelling.   Neurological:  Positive for tremors. Negative for light-headedness.   Objective:     Vital Signs (Most Recent):  Temp: 97.3 °F (36.3 °C) (04/01/23 1156)  Pulse: 65 (04/01/23 1156)  Resp: 18 (04/01/23 1156)  BP: 116/60 (04/01/23 1156)  SpO2: (!) 94 % (04/01/23 1156)   Vital Signs (24h Range):  Temp:   [97.3 °F (36.3 °C)-98.8 °F (37.1 °C)] 97.3 °F (36.3 °C)  Pulse:  [] 65  Resp:  [16-18] 18  SpO2:  [92 %-98 %] 94 %  BP: ()/(50-60) 116/60     Weight: 100.4 kg (221 lb 5.5 oz)  Body mass index is 34.67 kg/m².    Intake/Output Summary (Last 24 hours) at 4/1/2023 1408  Last data filed at 4/1/2023 1300  Gross per 24 hour   Intake 1110 ml   Output 4825 ml   Net -3715 ml        Physical Exam  Vitals and nursing note reviewed.   Constitutional:       General: She is not in acute distress.     Appearance: She is ill-appearing.   Eyes:      General: No scleral icterus.  Cardiovascular:      Rate and Rhythm: Regular rhythm. Bradycardia present.      Pulses: Normal pulses.      Heart sounds: Normal heart sounds. No murmur heard.  Pulmonary:      Effort: Pulmonary effort is normal. No respiratory distress.      Breath sounds: No wheezing.   Abdominal:      Palpations: Abdomen is soft.      Tenderness: There is no abdominal tenderness.   Musculoskeletal:      Right lower leg: Edema present.      Left lower leg: Edema present.      Comments: Bilateral lower extremity erythema and tenderness on palpation, no palpable abscess or open wound  Lower extremity edema, redness improved   Skin:     General: Skin is warm and dry.      Findings: No bruising.   Neurological:      Mental Status: She is alert and oriented to person, place, and time.      Comments: tremors       Significant Labs: All pertinent labs within the past 24 hours have been reviewed.    Significant Imaging: I have reviewed all pertinent imaging results/findings within the past 24 hours.      Assessment/Plan:      * Acute respiratory failure with hypoxia and hypercarbia  Patient with Hypoxic Respiratory failure which is Acute.  she is not on home oxygen. Supplemental oxygen was provided and noted- Oxygen Concentration (%):  [40] 40.   Signs/symptoms of respiratory failure include- shortness of breath.     On 2 L oxygen via NC saturating 96%.  Suspect dyspnea  and hypoxia multifactorial in setting of atelectasis, ?Mild volume overload (BNP maybe falsely low in obesity)    CTA chest ruled out PE, no large focal consolidation noted.  Procalcitonin negative less suggestive of pneumonia.  CT chest showed emphysema.  Not on home inhalers.  Suspect obstructive airway disease.  DuoNebs on back order.  Add Spiriva for now.  P.r.n. albuterol.  ABG showed pCO2 64, based on pH this appears to be chronic.  Per discussion with Pulmonary fellow, she would meet criteria for outpatient BiPAP use.      Incentive spirometer.  Increase mobility. Suspect subjective dyspnea may be in setting of volume overload +/- deconditioning or a systemic problem.  Patient reported lightheadedness while working with physical therapy which could be in setting of orthostasis as she was on suboptimal steroid dosing for adrenal insufficiency.  On chart review, discharged 01/2023 on total hydrocortisone 15 mg q.d..  Fludrocortisone added.  Also noted on chart review, last TSH in 01/2023 was 51.  Repeat TSH elevated to 41 but free T4 within normal limits.    s/p IV diuresis, continue. Incentive spirometer, PT, encourage mobilization.  BiPAP q.h.s.    Pulmonary consult given patient has persistent subjective dyspnea  - multifactorial due to chronic aspiration, volume overload, hypothyroidism  - CM obtaining NIV, should have at time of discharge    Adrenal insufficiency  Given extreme fatigue, hypotension-consulted endocrinology for adrenal insufficiency    -continue hydrocortisone 10/5 and fludrocortisone 100    UTI (urinary tract infection)  Last urine culture 03/20/2023 was negative.  UA this hospital stay did not show significant pyuria.  Patient has had several previous urine cultures with staph but also has a suprapubic catheter which may be colonized    Per patient she is supposed to be on oral dicloxacillin at home.  This drug is not available on formulary in this hospital and therefore was switched to  Keflex.  Given concern for possible cellulitis of lower extremity, will do ceftriaxone for now but do not think UTI needs to be treated at this time.  Blood cultures 3/23 show no growth till date    Tremor  -likely secondary to pregabalin  -will hold this medication for now; give prn 0.5 mg lorazepam      Debility  Deconditioned with almost bed-bound status  PT/OT      S/P insertion of intrathecal pump  Follows with pain clinic outpatient   Per patient change of pump due on 03/30/2023      Lymphedema of both lower extremities  Ultrasound negative for DVT  Given 80 mg Lasix in ED  Continue gentle diuresis; on lasix 40mg bid    Redness, tenderness and swelling suggestive of cellulitis the patient does not have systemic signs of infection.  She is chronically on steroids and therefore may not mount a strong immune response.  Procalcitonin low is reassuring.  Given recurrence of erythema and severe leg pain (difficult to gauge pain given history of chronic pain syndrome) -had switch to cefazolin; appears to be improving for now  -transitioned to Keflex     Primary hypothyroidism  Last TSH 51.8 in 01/2023, TSH remains elevated at 41 but free T4 normal    Increased synthroid to 150mcg; added cytomel 25mg qd              Neurogenic bladder  S/p suprapubic catheter      GERD (gastroesophageal reflux disease)  -continue daily PPI      SOB (shortness of breath)  As above      Chronic pain syndrome  With intrathecal Dilaudid pump in place   Continue home dose p.r.n. Norco, Tylenol    Per patient back pain is chronic due to multiple past surgeries but patient reported worsening pain while working with PT and history of falls    Thoracolumbar spine x-ray shows no acute fracture or displacement. Okay to work with PT/OT    Palliative care consult for chronic pain and overall goals of care  - added pregabalin and increased norco 3/28  - stop lyrica 3/31      Sleep apnea  -would benefit from noninvasive ventilation    -ordered      VTE Risk Mitigation (From admission, onward)         Ordered     enoxaparin injection 40 mg  Daily         03/23/23 0156     IP VTE HIGH RISK PATIENT  Once         03/23/23 0156                Discharge Planning   JACKIE: 3/27/2023     Code Status: Full Code   Is the patient medically ready for discharge?:     Reason for patient still in hospital (select all that apply): Patient trending condition  Discharge Plan A: Home, Home Health   Discharge Delays: None known at this time              Nic Mistry MD  Department of Hospital Medicine   [facility]

## 2023-04-01 NOTE — SUBJECTIVE & OBJECTIVE
Interval History:  Did not sleep well overnight.  Reports that tremors are still significant at this time.  Still having pain in bilateral lower extremities.  Discussed how difficult it has been to live with her chronic pain how she feels that she will never get back to be able to do what she used to be able to before she had her injury.    Review of Systems   Constitutional:  Positive for fatigue.   Respiratory:  Positive for shortness of breath.    Cardiovascular:  Positive for leg swelling.   Neurological:  Positive for tremors. Negative for light-headedness.   Objective:     Vital Signs (Most Recent):  Temp: 97.3 °F (36.3 °C) (04/01/23 1156)  Pulse: 65 (04/01/23 1156)  Resp: 18 (04/01/23 1156)  BP: 116/60 (04/01/23 1156)  SpO2: (!) 94 % (04/01/23 1156)   Vital Signs (24h Range):  Temp:  [97.3 °F (36.3 °C)-98.8 °F (37.1 °C)] 97.3 °F (36.3 °C)  Pulse:  [] 65  Resp:  [16-18] 18  SpO2:  [92 %-98 %] 94 %  BP: ()/(50-60) 116/60     Weight: 100.4 kg (221 lb 5.5 oz)  Body mass index is 34.67 kg/m².    Intake/Output Summary (Last 24 hours) at 4/1/2023 1408  Last data filed at 4/1/2023 1300  Gross per 24 hour   Intake 1110 ml   Output 4825 ml   Net -3715 ml        Physical Exam  Vitals and nursing note reviewed.   Constitutional:       General: She is not in acute distress.     Appearance: She is ill-appearing.   Eyes:      General: No scleral icterus.  Cardiovascular:      Rate and Rhythm: Regular rhythm. Bradycardia present.      Pulses: Normal pulses.      Heart sounds: Normal heart sounds. No murmur heard.  Pulmonary:      Effort: Pulmonary effort is normal. No respiratory distress.      Breath sounds: No wheezing.   Abdominal:      Palpations: Abdomen is soft.      Tenderness: There is no abdominal tenderness.   Musculoskeletal:      Right lower leg: Edema present.      Left lower leg: Edema present.      Comments: Bilateral lower extremity erythema and tenderness on palpation, no palpable abscess or open  wound  Lower extremity edema, redness improved   Skin:     General: Skin is warm and dry.      Findings: No bruising.   Neurological:      Mental Status: She is alert and oriented to person, place, and time.      Comments: tremors       Significant Labs: All pertinent labs within the past 24 hours have been reviewed.    Significant Imaging: I have reviewed all pertinent imaging results/findings within the past 24 hours.

## 2023-04-01 NOTE — ASSESSMENT & PLAN NOTE
With intrathecal Dilaudid pump in place   Continue home dose p.r.n. Norco, Tylenol    Per patient back pain is chronic due to multiple past surgeries but patient reported worsening pain while working with PT and history of falls    Thoracolumbar spine x-ray shows no acute fracture or displacement. Okay to work with PT/OT    Palliative care consult for chronic pain and overall goals of care  - added pregabalin and increased norco 3/28  - stop lyrica 3/31

## 2023-04-01 NOTE — NURSING
Medications given per MAR. Radha medicated overnight twice for tremors with Hydroxyzine but not helping too much. Telemetry in progress. SP cath care provided. Safety maintained. Call light  =in reach, bed alarm in use.   [History reviewed] : History reviewed. [Medications and Allergies reviewed] : Medications and allergies reviewed.

## 2023-04-02 PROCEDURE — 25000003 PHARM REV CODE 250: Mod: HCNC | Performed by: REGISTERED NURSE

## 2023-04-02 PROCEDURE — 63600175 PHARM REV CODE 636 W HCPCS: Mod: HCNC | Performed by: HOSPITALIST

## 2023-04-02 PROCEDURE — 11000001 HC ACUTE MED/SURG PRIVATE ROOM: Mod: HCNC

## 2023-04-02 PROCEDURE — 25000003 PHARM REV CODE 250: Mod: HCNC | Performed by: STUDENT IN AN ORGANIZED HEALTH CARE EDUCATION/TRAINING PROGRAM

## 2023-04-02 PROCEDURE — 94761 N-INVAS EAR/PLS OXIMETRY MLT: CPT | Mod: HCNC

## 2023-04-02 PROCEDURE — 25000003 PHARM REV CODE 250: Mod: HCNC | Performed by: HOSPITALIST

## 2023-04-02 PROCEDURE — 25000003 PHARM REV CODE 250: Mod: HCNC

## 2023-04-02 PROCEDURE — 27000221 HC OXYGEN, UP TO 24 HOURS: Mod: HCNC

## 2023-04-02 PROCEDURE — 94660 CPAP INITIATION&MGMT: CPT | Mod: HCNC

## 2023-04-02 PROCEDURE — 94640 AIRWAY INHALATION TREATMENT: CPT | Mod: HCNC

## 2023-04-02 PROCEDURE — 63600175 PHARM REV CODE 636 W HCPCS: Mod: HCNC | Performed by: REGISTERED NURSE

## 2023-04-02 PROCEDURE — 25000003 PHARM REV CODE 250: Mod: HCNC | Performed by: INTERNAL MEDICINE

## 2023-04-02 PROCEDURE — 94799 UNLISTED PULMONARY SVC/PX: CPT | Mod: HCNC

## 2023-04-02 PROCEDURE — 25000242 PHARM REV CODE 250 ALT 637 W/ HCPCS: Mod: HCNC | Performed by: HOSPITALIST

## 2023-04-02 PROCEDURE — 99900035 HC TECH TIME PER 15 MIN (STAT): Mod: HCNC

## 2023-04-02 RX ORDER — TRAZODONE HYDROCHLORIDE 100 MG/1
300 TABLET ORAL NIGHTLY
Status: DISCONTINUED | OUTPATIENT
Start: 2023-04-02 | End: 2023-04-03 | Stop reason: HOSPADM

## 2023-04-02 RX ORDER — FLUTICASONE PROPIONATE 50 MCG
2 SPRAY, SUSPENSION (ML) NASAL DAILY
Status: DISCONTINUED | OUTPATIENT
Start: 2023-04-02 | End: 2023-04-03 | Stop reason: HOSPADM

## 2023-04-02 RX ADMIN — ATORVASTATIN CALCIUM 80 MG: 40 TABLET, FILM COATED ORAL at 09:04

## 2023-04-02 RX ADMIN — HYDROCODONE BITARTRATE AND ACETAMINOPHEN 1 TABLET: 10; 325 TABLET ORAL at 03:04

## 2023-04-02 RX ADMIN — BUTALBITAL, ACETAMINOPHEN, AND CAFFEINE 1 TABLET: 50; 325; 40 TABLET ORAL at 09:04

## 2023-04-02 RX ADMIN — FLUDROCORTISONE ACETATE 100 MCG: 0.1 TABLET ORAL at 09:04

## 2023-04-02 RX ADMIN — FUROSEMIDE 40 MG: 10 INJECTION INTRAMUSCULAR; INTRAVENOUS at 05:04

## 2023-04-02 RX ADMIN — PANTOPRAZOLE SODIUM 40 MG: 40 TABLET, DELAYED RELEASE ORAL at 09:04

## 2023-04-02 RX ADMIN — FUROSEMIDE 40 MG: 10 INJECTION INTRAMUSCULAR; INTRAVENOUS at 04:04

## 2023-04-02 RX ADMIN — CEPHALEXIN 500 MG: 500 CAPSULE ORAL at 01:04

## 2023-04-02 RX ADMIN — POTASSIUM BICARBONATE 20 MEQ: 391 TABLET, EFFERVESCENT ORAL at 09:04

## 2023-04-02 RX ADMIN — LEVOTHYROXINE SODIUM 150 MCG: 25 TABLET ORAL at 05:04

## 2023-04-02 RX ADMIN — CEPHALEXIN 500 MG: 500 CAPSULE ORAL at 05:04

## 2023-04-02 RX ADMIN — LIOTHYRONINE SODIUM 25 MCG: 25 TABLET ORAL at 09:04

## 2023-04-02 RX ADMIN — HYDROCORTISONE 10 MG: 5 TABLET ORAL at 09:04

## 2023-04-02 RX ADMIN — LORAZEPAM 0.5 MG: 0.5 TABLET ORAL at 01:04

## 2023-04-02 RX ADMIN — ASPIRIN 81 MG: 81 TABLET, COATED ORAL at 09:04

## 2023-04-02 RX ADMIN — HYDROCORTISONE 5 MG: 5 TABLET ORAL at 04:04

## 2023-04-02 RX ADMIN — QUETIAPINE FUMARATE 200 MG: 100 TABLET ORAL at 09:04

## 2023-04-02 RX ADMIN — DRONABINOL 2.5 MG: 2.5 CAPSULE ORAL at 09:04

## 2023-04-02 RX ADMIN — TRAZODONE HYDROCHLORIDE 300 MG: 100 TABLET ORAL at 09:04

## 2023-04-02 RX ADMIN — TIOTROPIUM BROMIDE INHALATION SPRAY 2 PUFF: 3.12 SPRAY, METERED RESPIRATORY (INHALATION) at 08:04

## 2023-04-02 RX ADMIN — ENOXAPARIN SODIUM 40 MG: 40 INJECTION SUBCUTANEOUS at 04:04

## 2023-04-02 RX ADMIN — FLUTICASONE PROPIONATE 100 MCG: 50 SPRAY, METERED NASAL at 09:04

## 2023-04-02 RX ADMIN — FLUOXETINE HYDROCHLORIDE 60 MG: 20 CAPSULE ORAL at 09:04

## 2023-04-02 RX ADMIN — HYDROCODONE BITARTRATE AND ACETAMINOPHEN 1 TABLET: 10; 325 TABLET ORAL at 09:04

## 2023-04-02 RX ADMIN — CEPHALEXIN 500 MG: 500 CAPSULE ORAL at 09:04

## 2023-04-02 NOTE — PROGRESS NOTES
[facility] Medicine  Progress Note    Patient Name: Tasha Hawley  MRN: [id number]  Patient Class: IP- Inpatient   Admission Date: 3/22/2023  Length of Stay: 9 days  Attending Physician: Nic Mistry, *  Primary Care Provider: Mesfin Hodges Ii, MD        Subjective:     Principal Problem:Acute respiratory failure with hypoxia and hypercarbia        HPI:  67 year old female with a history of CHF, cirrhosis, meadows dependence presents with shortness of breath, myalgias, and lower extremity edema. The swelling has worsened and now there is redness. She says she does not wear oxygen at home. No chest pain. Reports recently seen at [facility] for UTI and was started on dicloxacillin. She says she had diarrhea as well but this was before the abx initiation.  UA appeared grossly infected 3 days ago however no growth to date on urine culture.  Patient with lower extremity edema.  Given 80 mg Lasix in ED. chest x-ray with interstitial edema.  Patient newly on O2 requirements.  Troponin negative.  BNP negative.  Patient will be admitted to Hospital Medicine      Overview/Hospital Course:  No notes on file    Interval History:  Still not sleeping well but tremors have resolved. Breathing doing ok. Still some LE pain. Discussed extensively with her daughter at the bedside. Will obtain NIV for discharge and arrange for outpatient f/u with Sleep Medicine, Pulm.    Review of Systems   Constitutional:  Positive for fatigue.   Respiratory:  Positive for shortness of breath.    Cardiovascular:  Positive for leg swelling.   Neurological:  Negative for tremors and light-headedness.   Objective:     Vital Signs (Most Recent):  Temp: 98 °F (36.7 °C) (04/02/23 0720)  Pulse: 67 (04/02/23 1147)  Resp: 20 (04/02/23 0918)  BP: 122/60 (04/02/23 0720)  SpO2: (!) 93 % (04/02/23 1219)   Vital Signs (24h Range):  Temp:  [97.7 °F (36.5 °C)-98.1 °F (36.7 °C)] 98 °F (36.7 °C)  Pulse:  [61-70] 67  Resp:  [16-20]  20  SpO2:  [91 %-99 %] 93 %  BP: ()/(50-65) 122/60     Weight: 102 kg (224 lb 13.9 oz)  Body mass index is 35.22 kg/m².    Intake/Output Summary (Last 24 hours) at 4/2/2023 1231  Last data filed at 4/2/2023 0548  Gross per 24 hour   Intake 735 ml   Output 3000 ml   Net -2265 ml        Physical Exam  Vitals and nursing note reviewed.   Constitutional:       General: She is not in acute distress.     Appearance: She is ill-appearing.   Eyes:      General: No scleral icterus.  Cardiovascular:      Rate and Rhythm: Regular rhythm. Bradycardia present.      Pulses: Normal pulses.      Heart sounds: Normal heart sounds. No murmur heard.  Pulmonary:      Effort: Pulmonary effort is normal. No respiratory distress.      Breath sounds: No wheezing.   Abdominal:      Palpations: Abdomen is soft.      Tenderness: There is no abdominal tenderness.   Musculoskeletal:      Right lower leg: Edema present.      Left lower leg: Edema present.      Comments: Bilateral lower extremity erythema and tenderness on palpation, no palpable abscess or open wound  Lower extremity edema, redness improved   Skin:     General: Skin is warm and dry.      Findings: No bruising.   Neurological:      Mental Status: She is alert and oriented to person, place, and time.      Comments: tremors       Significant Labs: All pertinent labs within the past 24 hours have been reviewed.    Significant Imaging: I have reviewed all pertinent imaging results/findings within the past 24 hours.      Assessment/Plan:      * Acute respiratory failure with hypoxia and hypercarbia  Patient with Hypoxic Respiratory failure which is Acute.  she is not on home oxygen. Supplemental oxygen was provided and noted- Oxygen Concentration (%):  [40] 40.   Signs/symptoms of respiratory failure include- shortness of breath.     On 2 L oxygen via NC saturating 96%.  Suspect dyspnea and hypoxia multifactorial in setting of atelectasis, ?Mild volume overload (BNP maybe falsely  low in obesity)    CTA chest ruled out PE, no large focal consolidation noted.  Procalcitonin negative less suggestive of pneumonia.  CT chest showed emphysema.  Not on home inhalers.  Suspect obstructive airway disease.  DuoNebs on back order.  Add Spiriva for now.  P.r.n. albuterol.  ABG showed pCO2 64, based on pH this appears to be chronic.  Per discussion with Pulmonary fellow, she would meet criteria for outpatient BiPAP use.      Incentive spirometer.  Increase mobility. Suspect subjective dyspnea may be in setting of volume overload +/- deconditioning or a systemic problem.  Patient reported lightheadedness while working with physical therapy which could be in setting of orthostasis as she was on suboptimal steroid dosing for adrenal insufficiency.  On chart review, discharged 01/2023 on total hydrocortisone 15 mg q.d..  Fludrocortisone added.  Also noted on chart review, last TSH in 01/2023 was 51.  Repeat TSH elevated to 41 but free T4 within normal limits.    s/p IV diuresis, continue. Incentive spirometer, PT, encourage mobilization.  BiPAP q.h.s.    Pulmonary consult given patient has persistent subjective dyspnea  - multifactorial due to chronic aspiration, volume overload, hypothyroidism  - CM obtaining NIV, should have at time of discharge    Adrenal insufficiency  Given extreme fatigue, hypotension-consulted endocrinology for adrenal insufficiency    -continue hydrocortisone 10/5 and fludrocortisone 100    UTI (urinary tract infection)  Last urine culture 03/20/2023 was negative.  UA this hospital stay did not show significant pyuria.  Patient has had several previous urine cultures with staph but also has a suprapubic catheter which may be colonized    Per patient she is supposed to be on oral dicloxacillin at home.  This drug is not available on formulary in this hospital and therefore was switched to Keflex.  Given concern for possible cellulitis of lower extremity, will do ceftriaxone for now  but do not think UTI needs to be treated at this time.  Blood cultures 3/23 show no growth till date    Tremor  -likely secondary to pregabalin  -will hold this medication for now; give prn 0.5 mg lorazepam      Debility  Deconditioned with almost bed-bound status  PT/OT      Insomnia due to medical condition  - seroquel; uptitrate trazodone    S/P insertion of intrathecal pump  Follows with pain clinic outpatient       Lymphedema of both lower extremities  Ultrasound negative for DVT  Given 80 mg Lasix in ED  Continue gentle diuresis; on lasix 40mg bid    Redness, tenderness and swelling suggestive of cellulitis the patient does not have systemic signs of infection.  She is chronically on steroids and therefore may not mount a strong immune response.  Procalcitonin low is reassuring.  Given recurrence of erythema and severe leg pain (difficult to gauge pain given history of chronic pain syndrome) -had switch to cefazolin; appears to be improving for now  -transitioned to Keflex     Primary hypothyroidism  Last TSH 51.8 in 01/2023, TSH remains elevated at 41 but free T4 normal    Increased synthroid to 150mcg; added cytomel 25mg qd              Neurogenic bladder  S/p suprapubic catheter      GERD (gastroesophageal reflux disease)  -continue daily PPI      Narcotic dependency, continuous  - see above      SOB (shortness of breath)  As above      Chronic pain syndrome  With intrathecal Dilaudid pump in place   Continue home dose p.r.n. Norco, Tylenol    Per patient back pain is chronic due to multiple past surgeries but patient reported worsening pain while working with PT and history of falls    Thoracolumbar spine x-ray shows no acute fracture or displacement. Okay to work with PT/OT    Palliative care consult for chronic pain and overall goals of care  - added pregabalin and increased norco 3/28  - stop lyrica 3/31      Sleep apnea  -would benefit from noninvasive ventilation   -ordered      VTE Risk Mitigation  (From admission, onward)         Ordered     enoxaparin injection 40 mg  Daily         03/23/23 0156     IP VTE HIGH RISK PATIENT  Once         03/23/23 0156                Discharge Planning   JACKIE: 3/27/2023     Code Status: Full Code   Is the patient medically ready for discharge?:     Reason for patient still in hospital (select all that apply): Patient trending condition  Discharge Plan A: Home, Home Health   Discharge Delays: None known at this time              Nic Mistry MD  Department of Hospital Medicine   [facility]

## 2023-04-02 NOTE — SUBJECTIVE & OBJECTIVE
Interval History:  Still not sleeping well but tremors have resolved. Breathing doing ok. Still some LE pain. Discussed extensively with her daughter at the bedside. Will obtain NIV for discharge and arrange for outpatient f/u with Sleep Medicine, Pulm.    Review of Systems   Constitutional:  Positive for fatigue.   Respiratory:  Positive for shortness of breath.    Cardiovascular:  Positive for leg swelling.   Neurological:  Negative for tremors and light-headedness.   Objective:     Vital Signs (Most Recent):  Temp: 98 °F (36.7 °C) (04/02/23 0720)  Pulse: 67 (04/02/23 1147)  Resp: 20 (04/02/23 0918)  BP: 122/60 (04/02/23 0720)  SpO2: (!) 93 % (04/02/23 1219)   Vital Signs (24h Range):  Temp:  [97.7 °F (36.5 °C)-98.1 °F (36.7 °C)] 98 °F (36.7 °C)  Pulse:  [61-70] 67  Resp:  [16-20] 20  SpO2:  [91 %-99 %] 93 %  BP: ()/(50-65) 122/60     Weight: 102 kg (224 lb 13.9 oz)  Body mass index is 35.22 kg/m².    Intake/Output Summary (Last 24 hours) at 4/2/2023 1231  Last data filed at 4/2/2023 0548  Gross per 24 hour   Intake 735 ml   Output 3000 ml   Net -2265 ml        Physical Exam  Vitals and nursing note reviewed.   Constitutional:       General: She is not in acute distress.     Appearance: She is ill-appearing.   Eyes:      General: No scleral icterus.  Cardiovascular:      Rate and Rhythm: Regular rhythm. Bradycardia present.      Pulses: Normal pulses.      Heart sounds: Normal heart sounds. No murmur heard.  Pulmonary:      Effort: Pulmonary effort is normal. No respiratory distress.      Breath sounds: No wheezing.   Abdominal:      Palpations: Abdomen is soft.      Tenderness: There is no abdominal tenderness.   Musculoskeletal:      Right lower leg: Edema present.      Left lower leg: Edema present.      Comments: Bilateral lower extremity erythema and tenderness on palpation, no palpable abscess or open wound  Lower extremity edema, redness improved   Skin:     General: Skin is warm and dry.       Findings: No bruising.   Neurological:      Mental Status: She is alert and oriented to person, place, and time.      Comments: tremors       Significant Labs: All pertinent labs within the past 24 hours have been reviewed.    Significant Imaging: I have reviewed all pertinent imaging results/findings within the past 24 hours.

## 2023-04-03 VITALS
WEIGHT: 221.81 LBS | HEART RATE: 63 BPM | OXYGEN SATURATION: 93 % | TEMPERATURE: 98 F | SYSTOLIC BLOOD PRESSURE: 104 MMHG | BODY MASS INDEX: 34.81 KG/M2 | RESPIRATION RATE: 18 BRPM | HEIGHT: 67 IN | DIASTOLIC BLOOD PRESSURE: 54 MMHG

## 2023-04-03 LAB
ANION GAP SERPL CALC-SCNC: 8 MMOL/L (ref 8–16)
BASOPHILS # BLD AUTO: 0.03 K/UL (ref 0–0.2)
BASOPHILS NFR BLD: 0.4 % (ref 0–1.9)
BUN SERPL-MCNC: 18 MG/DL (ref 8–23)
CALCIUM SERPL-MCNC: 8.7 MG/DL (ref 8.7–10.5)
CHLORIDE SERPL-SCNC: 97 MMOL/L (ref 95–110)
CO2 SERPL-SCNC: 34 MMOL/L (ref 23–29)
CREAT SERPL-MCNC: 0.9 MG/DL (ref 0.5–1.4)
DIFFERENTIAL METHOD: ABNORMAL
EOSINOPHIL # BLD AUTO: 0.3 K/UL (ref 0–0.5)
EOSINOPHIL NFR BLD: 4.2 % (ref 0–8)
ERYTHROCYTE [DISTWIDTH] IN BLOOD BY AUTOMATED COUNT: 15.5 % (ref 11.5–14.5)
EST. GFR  (NO RACE VARIABLE): >60 ML/MIN/1.73 M^2
GLUCOSE SERPL-MCNC: 84 MG/DL (ref 70–110)
HCT VFR BLD AUTO: 30.5 % (ref 37–48.5)
HGB BLD-MCNC: 9.2 G/DL (ref 12–16)
IMM GRANULOCYTES # BLD AUTO: 0.02 K/UL (ref 0–0.04)
IMM GRANULOCYTES NFR BLD AUTO: 0.3 % (ref 0–0.5)
LYMPHOCYTES # BLD AUTO: 1.5 K/UL (ref 1–4.8)
LYMPHOCYTES NFR BLD: 21.9 % (ref 18–48)
MCH RBC QN AUTO: 25.8 PG (ref 27–31)
MCHC RBC AUTO-ENTMCNC: 30.2 G/DL (ref 32–36)
MCV RBC AUTO: 86 FL (ref 82–98)
MONOCYTES # BLD AUTO: 0.7 K/UL (ref 0.3–1)
MONOCYTES NFR BLD: 9.7 % (ref 4–15)
NEUTROPHILS # BLD AUTO: 4.4 K/UL (ref 1.8–7.7)
NEUTROPHILS NFR BLD: 63.5 % (ref 38–73)
NRBC BLD-RTO: 0 /100 WBC
PLATELET # BLD AUTO: 210 K/UL (ref 150–450)
PMV BLD AUTO: 10 FL (ref 9.2–12.9)
POTASSIUM SERPL-SCNC: 4 MMOL/L (ref 3.5–5.1)
RBC # BLD AUTO: 3.56 M/UL (ref 4–5.4)
SODIUM SERPL-SCNC: 139 MMOL/L (ref 136–145)
WBC # BLD AUTO: 6.91 K/UL (ref 3.9–12.7)

## 2023-04-03 PROCEDURE — 97530 THERAPEUTIC ACTIVITIES: CPT | Mod: HCNC,CQ

## 2023-04-03 PROCEDURE — 25000003 PHARM REV CODE 250: Mod: HCNC | Performed by: STUDENT IN AN ORGANIZED HEALTH CARE EDUCATION/TRAINING PROGRAM

## 2023-04-03 PROCEDURE — 25000003 PHARM REV CODE 250: Mod: HCNC | Performed by: NURSE PRACTITIONER

## 2023-04-03 PROCEDURE — 99900035 HC TECH TIME PER 15 MIN (STAT): Mod: HCNC

## 2023-04-03 PROCEDURE — 63600175 PHARM REV CODE 636 W HCPCS: Mod: HCNC | Performed by: HOSPITALIST

## 2023-04-03 PROCEDURE — 85025 COMPLETE CBC W/AUTO DIFF WBC: CPT | Mod: HCNC | Performed by: HOSPITALIST

## 2023-04-03 PROCEDURE — 25000003 PHARM REV CODE 250: Mod: HCNC | Performed by: INTERNAL MEDICINE

## 2023-04-03 PROCEDURE — 80048 BASIC METABOLIC PNL TOTAL CA: CPT | Mod: HCNC | Performed by: HOSPITALIST

## 2023-04-03 PROCEDURE — 97116 GAIT TRAINING THERAPY: CPT | Mod: HCNC,CQ

## 2023-04-03 PROCEDURE — 25000003 PHARM REV CODE 250: Mod: HCNC | Performed by: REGISTERED NURSE

## 2023-04-03 PROCEDURE — 36415 COLL VENOUS BLD VENIPUNCTURE: CPT | Mod: HCNC | Performed by: HOSPITALIST

## 2023-04-03 PROCEDURE — 25000003 PHARM REV CODE 250: Mod: HCNC | Performed by: HOSPITALIST

## 2023-04-03 PROCEDURE — 25000003 PHARM REV CODE 250: Mod: HCNC

## 2023-04-03 RX ORDER — FLUDROCORTISONE ACETATE 0.1 MG/1
100 TABLET ORAL DAILY
Qty: 30 TABLET | Refills: 11 | Status: ON HOLD | OUTPATIENT
Start: 2023-04-03 | End: 2023-05-05 | Stop reason: SDUPTHER

## 2023-04-03 RX ORDER — CEPHALEXIN 500 MG/1
500 CAPSULE ORAL EVERY 8 HOURS
Qty: 18 CAPSULE | Refills: 0 | Status: CANCELLED | OUTPATIENT
Start: 2023-04-03 | End: 2023-04-09

## 2023-04-03 RX ORDER — FUROSEMIDE 40 MG/1
40 TABLET ORAL DAILY
Qty: 90 TABLET | Refills: 3 | Status: ON HOLD | OUTPATIENT
Start: 2023-04-03 | End: 2023-05-05 | Stop reason: SDUPTHER

## 2023-04-03 RX ORDER — DRONABINOL 2.5 MG/1
2.5 CAPSULE ORAL 2 TIMES DAILY
Qty: 60 CAPSULE | Refills: 11 | Status: CANCELLED | OUTPATIENT
Start: 2023-04-03 | End: 2024-04-02

## 2023-04-03 RX ORDER — LEVOTHYROXINE SODIUM 150 UG/1
150 TABLET ORAL
Qty: 30 TABLET | Refills: 11 | Status: SHIPPED | OUTPATIENT
Start: 2023-04-03 | End: 2023-09-22 | Stop reason: SDUPTHER

## 2023-04-03 RX ORDER — LIOTHYRONINE SODIUM 25 UG/1
25 TABLET ORAL DAILY
Qty: 30 TABLET | Refills: 11 | Status: SHIPPED | OUTPATIENT
Start: 2023-04-03 | End: 2023-09-15

## 2023-04-03 RX ORDER — FLUTICASONE PROPIONATE 50 MCG
2 SPRAY, SUSPENSION (ML) NASAL DAILY
Qty: 16 G | Refills: 0 | Status: SHIPPED | OUTPATIENT
Start: 2023-04-03 | End: 2024-04-02

## 2023-04-03 RX ORDER — CEPHALEXIN 500 MG/1
500 CAPSULE ORAL EVERY 8 HOURS
Qty: 12 CAPSULE | Refills: 0 | Status: SHIPPED | OUTPATIENT
Start: 2023-04-03 | End: 2023-04-07

## 2023-04-03 RX ORDER — POTASSIUM CHLORIDE 20 MEQ/1
20 TABLET, EXTENDED RELEASE ORAL DAILY
Qty: 180 TABLET | Refills: 3 | Status: ON HOLD
Start: 2023-04-03 | End: 2023-05-30 | Stop reason: SDUPTHER

## 2023-04-03 RX ADMIN — LEVOTHYROXINE SODIUM 150 MCG: 25 TABLET ORAL at 05:04

## 2023-04-03 RX ADMIN — CEPHALEXIN 500 MG: 500 CAPSULE ORAL at 05:04

## 2023-04-03 RX ADMIN — TIOTROPIUM BROMIDE INHALATION SPRAY 2 PUFF: 3.12 SPRAY, METERED RESPIRATORY (INHALATION) at 09:04

## 2023-04-03 RX ADMIN — FLUTICASONE PROPIONATE 100 MCG: 50 SPRAY, METERED NASAL at 09:04

## 2023-04-03 RX ADMIN — ATORVASTATIN CALCIUM 80 MG: 40 TABLET, FILM COATED ORAL at 09:04

## 2023-04-03 RX ADMIN — FLUDROCORTISONE ACETATE 100 MCG: 0.1 TABLET ORAL at 09:04

## 2023-04-03 RX ADMIN — DRONABINOL 2.5 MG: 2.5 CAPSULE ORAL at 09:04

## 2023-04-03 RX ADMIN — HYDROCORTISONE 10 MG: 5 TABLET ORAL at 09:04

## 2023-04-03 RX ADMIN — FUROSEMIDE 40 MG: 10 INJECTION INTRAMUSCULAR; INTRAVENOUS at 05:04

## 2023-04-03 RX ADMIN — HYDROXYZINE HYDROCHLORIDE 25 MG: 25 TABLET ORAL at 10:04

## 2023-04-03 RX ADMIN — HYDROCODONE BITARTRATE AND ACETAMINOPHEN 1 TABLET: 10; 325 TABLET ORAL at 10:04

## 2023-04-03 RX ADMIN — ASPIRIN 81 MG: 81 TABLET, COATED ORAL at 09:04

## 2023-04-03 RX ADMIN — PANTOPRAZOLE SODIUM 40 MG: 40 TABLET, DELAYED RELEASE ORAL at 09:04

## 2023-04-03 RX ADMIN — CEPHALEXIN 500 MG: 500 CAPSULE ORAL at 03:04

## 2023-04-03 RX ADMIN — FLUOXETINE HYDROCHLORIDE 60 MG: 20 CAPSULE ORAL at 09:04

## 2023-04-03 RX ADMIN — LIOTHYRONINE SODIUM 25 MCG: 25 TABLET ORAL at 09:04

## 2023-04-03 NOTE — PLAN OF CARE
met with patient on mornings rounds with MD team. Patient expected to discharge today. I contacted her daughter Lisa to discuss appointments. PCP appointment to be cancelled. PCC follow-up requested from nurse. I messaged Dr. Caro's staff to reschedule appointment. Follow-up information reviewed with daughter. Patient will be set up again with [facility] (will send orders when available). Patient's home oxygen delivered to room. I spoke with Sanjana at [place] [facility] and they have been in contact with patient/ regarding Triology delivery. Family will transport home at discharge. Patient encouraged to call with any questions or concerns.  will continue to follow patient through transitions of care and assist with any discharge needs.     Sanjana with [facility] notified of discharge today. She stated Alphonse Respiratory Therapist has been in contact with patient/family regarding delivery/education to home.     met with patient and all questions answered. I reminded her to call Alphonse with [facility]. She also tells me she believes concentrator was delivered to home as well.    I confirmed with Sanjana they will delivery trilogy tonight at 6 pm. She told me patient was requesting delivery tomorrow, but I told her to deliver tonight. We will be discharging patient soon.    Patient Contacts    Name Relation Home Work Mobile   Dangelo Hawley Spouse [phone]   Lisa Thompson Daughter   [phone]   Saray Thompson   [phone]     Future Appointments   Date Time Provider Department Center   4/4/2023  3:00 PM Kaitlynn Caro MD [facility] ALLIMM [facility]   4/13/2023  2:30 PM Imani Juarez MD [facility] IMPRI [facility]   4/28/2023  1:40 PM Omaira Henriquez NP [facility] SLEEP [place] [facility]   5/4/2023 11:00 AM Norma Pearson MD [facility] ENDODIA [facility]   5/12/2023  1:00 PM Cami Romero MD [facility] PULMSVC [facility]   7/7/2023 11:00 AM Mesfin Hodges II, MD [facility]  IM [facility] PCW       [facility]   [phone] [address] [place] [address]      Next Steps: Follow up  Appointment:   Instructions: [email or web address]  Home Health Services, Home Therapy Services [phone] [address] [facility] [place] [address]     Next Steps: Follow up  Appointment:   Instructions: [facility]  DME Provider [phone] [address] [facility] [place] [address]     Next Steps: Follow up  Appointment:   Instructions: [facility]    MD Kaitlynn Melissa MD  Allergy and Immunology [phone] [address] [place] [address]     Next Steps: Follow up  Appointment:   Instructions:  sent message to office to reschedule appointment scheduled tomorrow 4/4/23.        04/03/23 1113   Final Note   Assessment Type Final Discharge Note   Anticipated Discharge Disposition Home-Health   Hospital Resources/Appts/Education Provided Appointments scheduled by Navigator/Coordinator   Post-Acute Status   Post-Acute Authorization Home Health;HME   HME Status (!) Pending Delivery   Home Health Status Set-up Complete/Auth obtained   Discharge Delays None known at this time     Effie Mcgill RN    [phone]

## 2023-04-03 NOTE — PT/OT/SLP PROGRESS
Physical Therapy Treatment    Patient Name:  Tasha Hawley   MRN:  [id number]    Recommendations:     Discharge Recommendations: home health PT, home health OT (and 24/7 supervision/assist)  Discharge Equipment Recommendations: none  Barriers to discharge: None    Assessment:     Tasha Hawley is a 67 y.o. female admitted with a medical diagnosis of Acute respiratory failure with hypoxia and hypercarbia.  She presents with the following impairments/functional limitations: weakness, impaired endurance, impaired self care skills, impaired functional mobility, gait instability, impaired balance, decreased coordination, decreased lower extremity function, impaired cardiopulmonary response to activity, decreased safety awareness, edema ;pt with fair/good mobility today, able to amb. Short distance w/RW and O2@3L, though needing CGA for safety, and cueing for posture.    Rehab Prognosis: Good; patient would benefit from acute skilled PT services to address these deficits and reach maximum level of function.    Recent Surgery: * No surgery found *      Plan:     During this hospitalization, patient to be seen 3 x/week to address the identified rehab impairments via gait training, therapeutic activities, therapeutic exercises, neuromuscular re-education and progress toward the following goals:    Plan of Care Expires:  04/27/23    Subjective     Chief Complaint: fatigue, no pain  Patient/Family Comments/goals: pt agreeable to session, pleasant.  Pain/Comfort:  Pain Rating 1: 0/10  Pain Rating Post-Intervention 1: 0/10      Objective:     Communicated with nurse prior to session.  Patient found HOB elevated with bed alarm, telemetry, peripheral IV, oxygen (suprapubic catheter) upon PT entry to room.     General Precautions: Standard, fall  Orthopedic Precautions: N/A  Braces: N/A  Respiratory Status: Nasal cannula, flow 3 L/min     Functional Mobility:  Bed Mobility:     Supine to Sit: stand by assistance  Sit to  Supine: minimum assistance and at LE's  Transfers:     Sit to Stand:  contact guard assistance with rolling walker  Gait: amb'd 35' w/ RW and O2@3L, CGA/SBA, cueing for upright posture (pt forward flexed).       AM-PAC 6 CLICK MOBILITY  Turning over in bed (including adjusting bedclothes, sheets and blankets)?: 4  Sitting down on and standing up from a chair with arms (e.g., wheelchair, bedside commode, etc.): 3  Moving from lying on back to sitting on the side of the bed?: 4  Moving to and from a bed to a chair (including a wheelchair)?: 3  Need to walk in hospital room?: 3  Climbing 3-5 steps with a railing?: 1  Basic Mobility Total Score: 18       Treatment & Education:  Pt sat EOB and perf'd seated LAQ's and AP's x 10 ea.   O2@3L:95% following amb    Patient left HOB elevated with all lines intact, call button in reach, bed alarm on, and nurse notified..    GOALS:   Multidisciplinary Problems       Physical Therapy Goals       Not on file              Multidisciplinary Problems (Resolved)          Problem: Physical Therapy    Goal Priority Disciplines Outcome Goal Variances Interventions   Physical Therapy Goal   (Resolved)     PT, PT/OT Met     Description: Goals to be met by:  23    Patient will increase functional independence with mobility by performin. Supine to sit with Modified [place]  2. Sit to supine with Modified [place]  3. Sit to stand transfer with Supervision  4. Bed to chair transfer with Supervision using Rolling Walker  5. Gait  x 100 feet with Supervision using Rolling Walker.                          Time Tracking:     PT Received On: 23  PT Start Time: 1339     PT Stop Time: 1410  PT Total Time (min): 31 min     Billable Minutes: Gait Training 16 and Therapeutic Activity 15    Treatment Type: Treatment  PT/PTA: PTA     Number of PTA visits since last PT visit: 3     2023

## 2023-04-03 NOTE — HOSPITAL COURSE
"Ms. Hawley presented with acute hypoxic and hypercapnic respiratory failure which appears to be multifocal in origin.  Evaluated by pulmonology do feel that her ongoing dyspnea is related to combination hypothyroidism, obesity, edema, alveolar hypoventilation with atelectasis, aspiration, and adrenal insufficiency.  She also has component sleep apnea and will require noninvasive ventilation.  She was diuresed with IV Lasix while in house.  Initially placed on IV antibiotics for lower extremity cellulitis, which is now improving.  Transition to short course oral antibiotic.  She will continue daily PPI; she has issues with aspiration and her admission CT showed fluid-filled dilated esophagus.  Additionally, on admission her TSH was very elevated with undetectable T3; endocrinology consulted and have uptitrated her Synthroid and added Cytomel.  Have encouraged mobilization and will obtain home oxygen.  No blood clot noted on CTA; no pneumonia or significant pulmonary edema.  Will arrange for discharge today with outpatient sleep study, pulmonology follow-up, endocrinology follow-up, and home health with home oxygen and noninvasive ventilation.    BP (!) 104/54   Pulse 68   Temp 98.2 °F (36.8 °C)   Resp 18   Ht 5' 7" (1.702 m)   Wt 100.6 kg (221 lb 12.5 oz)   LMP  (LMP Unknown)   SpO2 (!) 93%   BMI 34.74 kg/m²   Physical Exam  Vitals and nursing note reviewed.   Constitutional:       General: She is not in acute distress.     Appearance: She is ill-appearing.   Eyes:      General: No scleral icterus.  Cardiovascular:      Rate and Rhythm: Regular rhythm. Bradycardia present.      Pulses: Normal pulses.      Heart sounds: Normal heart sounds. No murmur heard.  Pulmonary:      Effort: Pulmonary effort is normal. No respiratory distress.      Breath sounds: No wheezing.   Abdominal:      Palpations: Abdomen is soft.      Tenderness: There is no abdominal tenderness.   Musculoskeletal:      Right lower leg: " Edema present.      Left lower leg: Edema present.      Comments: Bilateral lower extremity erythema and tenderness on palpation, no palpable abscess or open wound  Lower extremity edema, redness improved   Skin:     General: Skin is warm and dry.      Findings: No bruising.   Neurological:      Mental Status: She is alert and oriented to person, place, and time.

## 2023-04-03 NOTE — PLAN OF CARE
Discharge orders noted. Additional clinical references attached.    Patient's discharge instructions given by bedside RN and reviewed via this VN.  Education provided on new medication, diagnosis, and follow-up appointments.  Teach back method used. Patient verbalized understanding. Trilogy to be delivered this evening.  O2 delivered already. All questions answered. Floor nurse notified.     Problem: Adult Inpatient Plan of Care  Goal: Plan of Care Review  Outcome: Met  Goal: Patient-Specific Goal (Individualized)  Outcome: Met  Goal: Absence of Hospital-Acquired Illness or Injury  Outcome: Met  Goal: Optimal Comfort and Wellbeing  Outcome: Met  Goal: Readiness for Transition of Care  Outcome: Met

## 2023-04-03 NOTE — PLAN OF CARE
[place] [facility]      HOME HEALTH ORDERS  FACE TO FACE ENCOUNTER    Patient Name: Tasha Hawley  YOB: 1955    PCP: Mesfin Hodegs Ii, MD   PCP Address: [address] / [place] [address]  PCP Phone Number: [phone]  PCP Fax: [phone]    Encounter Date: 3/22/23    Admit to Home Health    Diagnoses:  Active Hospital Problems    Diagnosis  POA    *Acute respiratory failure with hypoxia and hypercarbia [J96.01, J96.02]  Yes    Palliative care encounter [Z51.5]  Not Applicable    Adrenal insufficiency [E27.40]  Yes    UTI (urinary tract infection) [N39.0]  Yes     Chronic    Tremor [R25.1]  Yes    Debility [R53.81]  Yes    Insomnia due to medical condition [G47.01]  Yes     Chronic    S/P insertion of intrathecal pump [Z98.890]  Not Applicable     Chronic    Lymphedema of both lower extremities [I89.0]  Yes     Chronic    Primary hypothyroidism [E03.9]  Yes     Chronic    Neurogenic bladder [N31.9]  Yes     Chronic    GERD (gastroesophageal reflux disease) [K21.9]  Yes    Narcotic dependency, continuous [F11.20]  Yes     Chronic    SOB (shortness of breath) [R06.02]  Yes    Chronic pain syndrome [G89.4]  Yes     Chronic    Sleep apnea [G47.30]  Yes      Resolved Hospital Problems    Diagnosis Date Resolved POA    Urinary retention [R33.9] 03/24/2023 Yes     Chronic       Follow Up Appointments:  Future Appointments   Date Time Provider Department Center   4/4/2023  9:20 AM Mesfin Hodges II, MD [facility] DANY KNIGHT   4/4/2023  3:00 PM Kaitlynn Caro MD [facility] ALLIMM [facility]meet   4/28/2023  1:40 PM Omaira Henriquez NP [facility] SLEEP [facility]   5/4/2023 11:00 AM Norma Pearson MD [facility] ENDODIA [facility]   5/12/2023  1:00 PM Cami Romero MD [facility] PULMSVC [facility]   7/7/2023 11:00 AM Mesfin Hodges II, MD [facility] Thierry KNIGHT       Allergies:  Review of patient's allergies indicates:   Allergen Reactions    (d)-limonene flavor      Other reaction(s): difficult  intubation  Other reaction(s): Difficulty breathing    Bactrim [sulfamethoxazole-trimethoprim] Anaphylaxis    Benadryl [diphenhydramine hcl] Shortness Of Breath    Fentanyl Itching, Nausea And Vomiting and Swelling             Imitrex [sumatriptan succinate] Shortness Of Breath    Percocet [oxycodone-acetaminophen] Shortness Of Breath    Topamax [topiramate] Shortness Of Breath    Vancomycin Anaphylaxis     Rash    Butorphanol tartrate     Darvocet a500 [propoxyphene n-acetaminophen]      Other reaction(s): Difficulty breathing    Evening primrose (oenothera biennis)     Lyrica [pregabalin] Other (See Comments)     tremors    White petrolatum-zinc     Zinc oxide-white petrolatum      Other reaction(s): Difficulty breathing    Latex, natural rubber Itching and Rash    Phenytoin Rash and Other (See Comments)     Trouble breathing       Medications: Review discharge medications with patient and family and provide education.    Current Facility-Administered Medications   Medication Dose Route Frequency Provider Last Rate Last Admin    aluminum-magnesium hydroxide-simethicone 200-200-20 mg/5 mL suspension 30 mL  30 mL Oral Q6H PRN Bharti Sullivan MD   30 mL at 03/25/23 0932    aspirin EC tablet 81 mg  81 mg Oral Daily Betito Patel NP   81 mg at 04/03/23 0911    atorvastatin tablet 80 mg  80 mg Oral Daily Betito Patel NP   80 mg at 04/03/23 0911    butalbital-acetaminophen-caffeine -40 mg per tablet 1 tablet  1 tablet Oral Daily PRN Betito Patel NP   1 tablet at 04/02/23 0909    cephALEXin capsule 500 mg  500 mg Oral Q8H Nic Mistry MD   500 mg at 04/03/23 0530    dronabinoL capsule 2.5 mg  2.5 mg Oral BID Nic Mistry MD   2.5 mg at 04/03/23 0911    enoxaparin injection 40 mg  40 mg Subcutaneous Daily Betito Patel NP   40 mg at 04/02/23 1600    fludrocortisone tablet 100 mcg  100 mcg Oral Daily Bharti Sullivan MD   100 mcg at 04/03/23 0911    FLUoxetine  capsule 60 mg  60 mg Oral Daily Betito Patel NP   60 mg at 04/03/23 0911    fluticasone propionate 50 mcg/actuation nasal spray 100 mcg  2 spray Each Nostril Daily Nic Mistry MD   100 mcg at 04/03/23 0911    furosemide injection 40 mg  40 mg Intravenous BID Nic Mistry MD   40 mg at 04/03/23 0530    HYDROcodone-acetaminophen  mg per tablet 1 tablet  1 tablet Oral Q6H PRN Violeta Galindo NP   1 tablet at 04/03/23 1014    hydrocortisone tablet 10 mg  10 mg Oral Daily Betito Patel NP   10 mg at 04/03/23 0911    hydrocortisone tablet 5 mg  5 mg Oral Daily PM Bharti Sullivan MD   5 mg at 04/02/23 1600    Hydromorphone 7.5mg/ml IT pain pump 40mL   Intrathecal Continuous Nic Mistry MD        hydrOXYzine HCL tablet 25 mg  25 mg Oral TID PRN Annie Ramirez NP   25 mg at 04/03/23 1014    levothyroxine tablet 150 mcg  150 mcg Oral Before breakfast Cristiane Queen MD   150 mcg at 04/03/23 0530    liothyronine tablet 25 mcg  25 mcg Oral Daily Cristiane Queen MD   25 mcg at 04/03/23 0911    LORazepam tablet 0.5 mg  0.5 mg Oral Q6H PRN Nic Mistry MD   0.5 mg at 04/02/23 1349    pantoprazole EC tablet 40 mg  40 mg Oral Daily Nic Mistry MD   40 mg at 04/03/23 0911    polyethylene glycol packet 17 g  17 g Oral BID PRN HEVER Kilpatrick        potassium bicarbonate disintegrating tablet 20 mEq  20 mEq Oral BID Bharti Sullivan MD   20 mEq at 04/02/23 2104    potassium bicarbonate disintegrating tablet 40 mEq  40 mEq Oral Once Bharti Sullivan MD        QUEtiapine tablet 200 mg  200 mg Oral QHS Betito Patel NP   200 mg at 04/02/23 2104    senna tablet 8.6 mg  8.6 mg Oral Daily PRN HEVER Kilpatrick   8.6 mg at 03/29/23 2109    tiotropium bromide 2.5 mcg/actuation inhaler 2 puff  2 puff Inhalation Daily Bharti Sullivan MD   2 puff at 04/03/23 0977    traZODone tablet 300 mg  300 mg Oral QHS Nic Mistry MD    300 mg at 04/02/23 2104     Current Discharge Medication List        START taking these medications    Details   cephALEXin (KEFLEX) 500 MG capsule Take 1 capsule (500 mg total) by mouth every 8 (eight) hours. for 4 days  Qty: 12 capsule, Refills: 0      fludrocortisone (FLORINEF) 0.1 mg Tab Take 1 tablet (100 mcg total) by mouth once daily.  Qty: 30 tablet, Refills: 11      fluticasone propionate (FLONASE) 50 mcg/actuation nasal spray 2 sprays (100 mcg total) by Each Nostril route once daily.  Qty: 9.9 mL, Refills: 0      liothyronine (CYTOMEL) 25 MCG Tab Take 1 tablet (25 mcg total) by mouth once daily.  Qty: 30 tablet, Refills: 11      tiotropium bromide (SPIRIVA RESPIMAT) 2.5 mcg/actuation inhaler Inhale 2 puffs into the lungs once daily. Controller  Qty: 0.1 g, Refills: 0           CONTINUE these medications which have CHANGED    Details   furosemide (LASIX) 40 MG tablet Take 1 tablet (40 mg total) by mouth once daily.  Qty: 90 tablet, Refills: 3      levothyroxine (SYNTHROID) 150 MCG tablet Take 1 tablet (150 mcg total) by mouth before breakfast.  Qty: 30 tablet, Refills: 11      potassium chloride SA (K-DUR,KLOR-CON) 20 MEQ tablet Take 1 tablet (20 mEq total) by mouth once daily.  Qty: 180 tablet, Refills: 3    Associated Diagnoses: Congestive heart failure, unspecified HF chronicity, unspecified heart failure type           CONTINUE these medications which have NOT CHANGED    Details   aspirin (ECOTRIN) 81 MG EC tablet Take 1 tablet (81 mg total) by mouth once daily.  Qty: 30 tablet, Refills: 0      atorvastatin (LIPITOR) 80 MG tablet TAKE ONE TABLET BY MOUTH EVERY DAY  Qty: 90 tablet, Refills: 3    Associated Diagnoses: Mixed hyperlipidemia      butalbital-acetaminophen-caffeine -40 mg (FIORICET, ESGIC) -40 mg per tablet Take 1 tablet by mouth daily as needed.  Qty: 15 tablet, Refills: 0      FLUoxetine 20 MG capsule Take 3 capsules (60 mg total) by mouth once daily.  Qty: 270 capsule, Refills:  3    Associated Diagnoses: Anxiety      HYDROcodone-acetaminophen (NORCO)  mg per tablet Take 1 tablet by mouth every 6 (six) hours as needed for breakthrough pain.      hydrocortisone (CORTEF) 10 MG Tab Take 1 tablet (10 mg total) by mouth once daily.  Qty: 60 tablet, Refills: 3      intrathecal pain pump compound Hydromorphone (7.5 mg/mL) infusion at 8.59 mg/day (0.3578 mg/hr) out of a total reservoir volume of 40 mL  Pump filled monthly      LIDOcaine (LIDODERM) 5 % Place 1 patch onto the skin once daily. Remove & Discard patch within 12 hours or as directed by MD  Refills: 0      nitroGLYCERIN (NITROSTAT) 0.4 MG SL tablet Place 0.4 mg under the tongue every 5 (five) minutes as needed for Chest pain.      pantoprazole (PROTONIX) 40 MG tablet Take 1 tablet (40 mg total) by mouth once daily.  Qty: 90 tablet, Refills: 3    Associated Diagnoses: Gastroesophageal reflux disease, unspecified whether esophagitis present      promethazine (PHENERGAN) 25 MG tablet Take 25 mg by mouth every 6 (six) hours as needed for Nausea.      QUEtiapine (SEROQUEL) 100 MG Tab TAKE 2 TABLETS (200 MG) BY MOUTH NIGHTLY  Qty: 180 tablet, Refills: 3    Associated Diagnoses: Insomnia due to medical condition      senna (SENNA LAX) 8.6 mg tablet Take 2 tablets by mouth 2 (two) times daily.      traZODone (DESYREL) 300 MG tablet Take 1 tablet (300 mg total) by mouth every evening.  Qty: 90 tablet, Refills: 0    Associated Diagnoses: Insomnia due to medical condition      mirabegron (MYRBETRIQ) 50 mg Tb24 Take 1 tablet (50 mg total) by mouth once daily.  Qty: 90 tablet, Refills: 3    Associated Diagnoses: Urge incontinence           STOP taking these medications       dicloxacillin (DYNAPEN) 500 MG capsule Comments:   Reason for Stopping:                 I have seen and examined this patient within the last 30 days. My clinical findings that support the need for the home health skilled services and home bound status are the  following:no   Weakness/numbness causing balance and gait disturbance due to Weakness/Debility making it taxing to leave home.     Diet:   cardiac diet    Labs:  N/a    Referrals/ Consults  Physical Therapy to evaluate and treat. Evaluate for home safety and equipment needs; Establish/upgrade home exercise program. Perform / instruct on therapeutic exercises, gait training, transfer training, and Range of Motion.  Occupational Therapy to evaluate and treat. Evaluate home environment for safety and equipment needs. Perform/Instruct on transfers, ADL training, ROM, and therapeutic exercises.   to evaluate for community resources/long-range planning.  Aide to provide assistance with personal care, ADLs, and vital signs.    Activities:   activity as tolerated    Nursing:   Agency to admit patient within 24 hours of hospital discharge unless specified on physician order or at patient request    SN to complete comprehensive assessment including routine vital signs. Instruct on disease process and s/s of complications to report to MD. Review/verify medication list sent home with the patient at time of discharge  and instruct patient/caregiver as needed. Frequency may be adjusted depending on start of care date.     Skilled nurse to perform up to 3 visits PRN for symptoms related to diagnosis    Notify MD if SBP > 160 or < 90; DBP > 90 or < 50; HR > 120 or < 50; Temp > 101; O2 < 88%; Other:       Ok to schedule additional visits based on staff availability and patient request on consecutive days within the home health episode.    When multiple disciplines ordered:    Start of Care occurs on Sunday - Wednesday schedule remaining discipline evaluations as ordered on separate consecutive days following the start of care.    Thursday SOC -schedule subsequent evaluations Friday and Monday the following week.     Friday - Saturday SOC - schedule subsequent discipline evaluations on consecutive days starting Monday of  the following week.    For all post-discharge communication and subsequent orders please contact patient's primary care physician. If unable to reach primary care physician or do not receive response within 30 minutes, please contact Imani Juarez for clinical staff order clarification    Miscellaneous   Motley Care: Instruct patient/caregiver to empty Motley bag.  Change Motley every month.  Routine Skin for Bedridden Patients: Instruct patient/caregiver to apply moisture barrier cream to all skin folds and wet areas in perineal area daily and after baths and all bowel movements.  Heart Failure:      SN to instruct on the following:    Instruct on the definition of CHF.   Instruct on the signs/sympoms of CHF to be reported.   Instruct on and monitor daily weights.   Instruct on factors that cause exacerbation.   Instruct on action, dose, schedule, and side effects of medications.   Instruct on diet as prescribed.   Instruct on activity allowed.   Instruct on life-style modifications for life long management of CHF   SN to assess compliance with daily weights, diet, medications, fluid retention,    safety precautions, activities permitted and life-style modifications.   Additional 1-2 SN visits per week as needed for signs and symptoms     of CHF exacerbation.      For Weight Gain > 2-3 lbs in 1 day or 4-6 lbs over 1 week notify PCP:  CHF DIURETIC SLIDING SCALE     Skilled nurse visits daily to instruct and monitor medication adherence until target weight. Then resume prior order frequency.     If weight gain exceeds 5 lbs over target weight, call MD  If weight gain of 3-4 lbs over target weight, then:     Increase Furosemide - current dose (mg/day) to 40mg double dose and frequency of twice daily until target weight achieved or maximum 3 days.     If already on max oral daily dose, see IV diuretic instructions.    After 3 days of increased oral diuretic dose, get BMP. If patient is on increased oral diuretic dose  greater than 5 days, repeat BMP at day 7 of increased dose.     Potassium supplementation   Scr > 1.5 mg/dl  Scr  1.5 mg/dl    K < 3.0 - NOTIFY MD and 40 mEq bid  40 mEq tid   K- 3.1-3.3  20 mEq bid  20 mEq tid    K 3.4-3.7  10 mEq bid  10 mEq tid      If target weight not reached after 5 days of increased oral diuretic, proceed to IV diuretic with daily patient contact to include face-to-face visit and telephone encounters.       Home Oxygen:  Oxygen at 2 L/min nasal canula to be used:  Continuously and As needed for SOB., Assess oxygen saturation via pulse oximeter as needed for increase in SOB., Notify physician if oxygen saturation less than 88%, and Consult respiratory therapy for instruction on home oxygen use    Home Health Aide:  Nursing Three times weekly, Physical Therapy Three times weekly, Occupational Therapy Three times weekly, Medical Social Work Twice weekly, Respiratory Therapy Three times weekly, and Home Health Aide Daily    Wound Care Orders  no    I certify that this patient is confined to her home and needs intermittent skilled nursing care, physical therapy, and occupational therapy.

## 2023-04-03 NOTE — DISCHARGE SUMMARY
[facility] Medicine  Discharge Summary      Patient Name: Tasha Hawley  MRN: [id number]  LUH: [id number]  Patient Class: IP- Inpatient  Admission Date: 3/22/2023  Hospital Length of Stay: 10 days  Discharge Date and Time:  04/03/2023 2:10 PM  Attending Physician: Nic Mistry, *   Discharging Provider: Nic Mistry MD  Primary Care Provider: Mesfin Hodges Ii, MD    Primary Care Team: Networked reference to record PCT     HPI:   67 year old female with a history of CHF, cirrhosis, meadows dependence presents with shortness of breath, myalgias, and lower extremity edema. The swelling has worsened and now there is redness. She says she does not wear oxygen at home. No chest pain. Reports recently seen at [facility] for UTI and was started on dicloxacillin. She says she had diarrhea as well but this was before the abx initiation.  UA appeared grossly infected 3 days ago however no growth to date on urine culture.  Patient with lower extremity edema.  Given 80 mg Lasix in ED. chest x-ray with interstitial edema.  Patient newly on O2 requirements.  Troponin negative.  BNP negative.  Patient will be admitted to Hospital Medicine      * No surgery found *      Hospital Course:   Ms. Hawley presented with acute hypoxic and hypercapnic respiratory failure which appears to be multifocal in origin.  Evaluated by pulmonology do feel that her ongoing dyspnea is related to combination hypothyroidism, obesity, edema, alveolar hypoventilation with atelectasis, aspiration, and adrenal insufficiency.  She also has component sleep apnea and will require noninvasive ventilation.  She was diuresed with IV Lasix while in house.  Initially placed on IV antibiotics for lower extremity cellulitis, which is now improving.  Transition to short course oral antibiotic.  She will continue daily PPI; she has issues with aspiration and her admission CT showed fluid-filled dilated esophagus.   "Additionally, on admission her TSH was very elevated with undetectable T3; endocrinology consulted and have uptitrated her Synthroid and added Cytomel.  Have encouraged mobilization and will obtain home oxygen.  No blood clot noted on CTA; no pneumonia or significant pulmonary edema.  Will arrange for discharge today with outpatient sleep study, pulmonology follow-up, endocrinology follow-up, and home health with home oxygen and noninvasive ventilation.    BP (!) 104/54   Pulse 68   Temp 98.2 °F (36.8 °C)   Resp 18   Ht 5' 7" (1.702 m)   Wt 100.6 kg (221 lb 12.5 oz)   LMP  (LMP Unknown)   SpO2 (!) 93%   BMI 34.74 kg/m²   Physical Exam  Vitals and nursing note reviewed.   Constitutional:       General: She is not in acute distress.     Appearance: She is ill-appearing.   Eyes:      General: No scleral icterus.  Cardiovascular:      Rate and Rhythm: Regular rhythm. Bradycardia present.      Pulses: Normal pulses.      Heart sounds: Normal heart sounds. No murmur heard.  Pulmonary:      Effort: Pulmonary effort is normal. No respiratory distress.      Breath sounds: No wheezing.   Abdominal:      Palpations: Abdomen is soft.      Tenderness: There is no abdominal tenderness.   Musculoskeletal:      Right lower leg: Edema present.      Left lower leg: Edema present.      Comments: Bilateral lower extremity erythema and tenderness on palpation, no palpable abscess or open wound  Lower extremity edema, redness improved   Skin:     General: Skin is warm and dry.      Findings: No bruising.   Neurological:      Mental Status: She is alert and oriented to person, place, and time.        Goals of Care Treatment Preferences:  Code Status: Full Code    Health care agent: Dangelo Hawley (spouse)  Health care agent number: [phone]    Living Will: Yes     What is most important right now is to focus on symptom/pain control, extending life as long as possible, even it it means sacrificing quality.  Accordingly, we " have decided that the best plan to meet the patient's goals includes continuing with treatment.      Consults:   Consults (From admission, onward)        Status Ordering Provider     Inpatient consult to Pulmonology  Once        Provider:  Franco Samuels MD    Completed [facility].     Inpatient consult to Palliative Care  Once        Provider:  Violeta Galindo NP    Completed [facility].     Inpatient consult to Endocrinology  Once        Provider:  Cristiane Queen MD    Acknowledged [facility].          No new Assessment & Plan notes have been filed under this hospital service since the last note was generated.  Service: Hospital Medicine    Final Active Diagnoses:    Diagnosis Date Noted POA    PRINCIPAL PROBLEM:  Acute respiratory failure with hypoxia and hypercarbia [J96.01, J96.02] 03/23/2023 Yes    Palliative care encounter [Z51.5] 03/29/2023 Not Applicable    Adrenal insufficiency [E27.40] 01/17/2023 Yes    UTI (urinary tract infection) [N39.0] 06/03/2021 Yes     Chronic    Tremor [R25.1]  Yes    Debility [R53.81] 01/13/2021 Yes    Insomnia due to medical condition [G47.01] 12/08/2017 Yes     Chronic    S/P insertion of intrathecal pump [Z98.890] 03/31/2017 Not Applicable     Chronic    Lymphedema of both lower extremities [I89.0] 03/02/2017 Yes     Chronic    Primary hypothyroidism [E03.9] 02/02/2017 Yes     Chronic    Neurogenic bladder [N31.9] 09/27/2016 Yes     Chronic    GERD (gastroesophageal reflux disease) [K21.9] 08/16/2016 Yes    Narcotic dependency, continuous [F11.20] 07/18/2014 Yes     Chronic    SOB (shortness of breath) [R06.02] 05/28/2014 Yes    Chronic pain syndrome [G89.4] 05/01/2013 Yes     Chronic    Sleep apnea [G47.30]  Yes      Problems Resolved During this Admission:    Diagnosis Date Noted Date Resolved POA    Urinary retention [R33.9] 12/21/2016 03/24/2023 Yes     Chronic       Discharged Condition: fair    Disposition: Home or Self Care    Follow Up:    "Follow-up Information     [facility] Follow up.    Why: [facility] [employer]  Contact information:  [address]  [place] [address]  [phone]             [facility] Follow up.    Specialties: Home Health Services, Home Therapy Services  Why: Home Health [employer]  Contact information:  [address], [address]  [place] [address]  [phone]           [facility] Follow up.    Specialty: DME Provider  Why: [employer]  Contact information:  [address]  [facility]  [facility] [address]  [phone]             Kaitlynn Caro MD Follow up.    Specialty: Allergy and Immunology  Why:  sent message to office to reschedule appointment scheduled tomorrow 4/4/23.  Contact information:  [address]  [facility] [address]  [phone]                       Patient Instructions:      OXYGEN FOR HOME USE     Order Specific Question Answer Comments   Liter Flow 2    Duration With activity    Qualifying Test Performed at: Activity    Oxygen saturation at rest 90    Oxygen saturation with activity 84    Oxygen saturation with activity on oxygen 93    Portable mode: continuous    Route nasal cannula    Device: home concentrator with portable tanks    Length of need (in months): 99 mos    Patient condition with qualifying saturation CHF    Height: 5' 7" (1.702 m)    Weight: 100.4 kg (221 lb 5.5 oz)    Alternative treatment measures have been tried or considered and deemed clinically ineffective. Yes      Ambulatory referral/consult to Pulmonology   Standing Status: Future   Referral Priority: Routine Referral Type: Consultation   Referral Reason: Specialty Services Required   Requested Specialty: Pulmonary Disease   Number of Visits Requested: 1     Ambulatory referral/consult to Endocrinology   Standing Status: Future   Referral Priority: Routine Referral Type: Consultation   Requested Specialty: Endocrinology   Number of " Visits Requested: 1     Ambulatory referral/consult to Sleep Disorders   Standing Status: Future   Referral Priority: Routine Referral Type: Consultation   Requested Specialty: Sleep Medicine   Number of Visits Requested: 1     Ambulatory referral/consult to Endocrinology   Standing Status: Future   Referral Priority: Routine Referral Type: Consultation   Requested Specialty: Endocrinology   Number of Visits Requested: 1     Diet Adult Regular     Notify your health care provider if you experience any of the following:  temperature >100.4     Notify your health care provider if you experience any of the following:  persistent nausea and vomiting or diarrhea     Notify your health care provider if you experience any of the following:  severe uncontrolled pain     Notify your health care provider if you experience any of the following:  difficulty breathing or increased cough     Notify your health care provider if you experience any of the following:  severe persistent headache     Notify your health care provider if you experience any of the following:  persistent dizziness, light-headedness, or visual disturbances     Notify your health care provider if you experience any of the following:  increased confusion or weakness     Activity as tolerated       Significant Diagnostic Studies: N/A    Pending Diagnostic Studies:     None         Medications:  Reconciled Home Medications:      Medication List      START taking these medications    cephALEXin 500 MG capsule  Commonly known as: KEFLEX  Take 1 capsule (500 mg total) by mouth every 8 (eight) hours. for 4 days     fludrocortisone 0.1 mg Tab  Commonly known as: FLORINEF  Take 1 tablet (100 mcg total) by mouth once daily.     fluticasone propionate 50 mcg/actuation nasal spray  Commonly known as: FLONASE  2 sprays (100 mcg total) by Each Nostril route once daily.     liothyronine 25 MCG Tab  Commonly known as: CYTOMEL  Take 1 tablet (25 mcg total) by mouth once  daily.     SPIRIVA RESPIMAT 2.5 mcg/actuation inhaler  Generic drug: tiotropium bromide  Inhale 2 puffs into the lungs once daily. Controller        CHANGE how you take these medications    atorvastatin 80 MG tablet  Commonly known as: LIPITOR  TAKE ONE TABLET BY MOUTH EVERY DAY  What changed: when to take this     levothyroxine 150 MCG tablet  Commonly known as: SYNTHROID  Take 1 tablet (150 mcg total) by mouth before breakfast.  What changed:   · medication strength  · how much to take  · Another medication with the same name was removed. Continue taking this medication, and follow the directions you see here.     potassium chloride SA 20 MEQ tablet  Commonly known as: K-DUR,KLOR-CON  Take 1 tablet (20 mEq total) by mouth once daily.  What changed: when to take this     QUEtiapine 100 MG Tab  Commonly known as: SEROQUEL  TAKE 2 TABLETS (200 MG) BY MOUTH NIGHTLY  What changed:   · how much to take  · how to take this  · when to take this  · additional instructions        CONTINUE taking these medications    aspirin 81 MG EC tablet  Commonly known as: ECOTRIN  Take 1 tablet (81 mg total) by mouth once daily.     butalbital-acetaminophen-caffeine -40 mg -40 mg per tablet  Commonly known as: FIORICET, ESGIC  Take 1 tablet by mouth daily as needed.     FLUoxetine 20 MG capsule  Take 3 capsules (60 mg total) by mouth once daily.     furosemide 40 MG tablet  Commonly known as: LASIX  Take 1 tablet (40 mg total) by mouth once daily.     HYDROcodone-acetaminophen  mg per tablet  Commonly known as: NORCO  Take 1 tablet by mouth every 6 (six) hours as needed for breakthrough pain.     hydrocortisone 10 MG Tab  Commonly known as: CORTEF  Take 1 tablet (10 mg total) by mouth once daily.     INTRATHECAL PAIN PUMP COMPOUND  Hydromorphone (7.5 mg/mL) infusion at 8.59 mg/day (0.3578 mg/hr) out of a total reservoir volume of 40 mL  Pump filled monthly     LIDOcaine 5 %  Commonly known as: LIDODERM  Place 1 patch  onto the skin once daily. Remove & Discard patch within 12 hours or as directed by MD     MYRDUNIATRIQ 50 mg Tb24  Generic drug: mirabegron  Take 1 tablet (50 mg total) by mouth once daily.     nitroGLYCERIN 0.4 MG SL tablet  Commonly known as: NITROSTAT  Place 0.4 mg under the tongue every 5 (five) minutes as needed for Chest pain.     pantoprazole 40 MG tablet  Commonly known as: PROTONIX  Take 1 tablet (40 mg total) by mouth once daily.     promethazine 25 MG tablet  Commonly known as: PHENERGAN  Take 25 mg by mouth every 6 (six) hours as needed for Nausea.     senna 8.6 mg tablet  Commonly known as: SENNA LAX  Take 2 tablets by mouth 2 (two) times daily.     traZODone 300 MG tablet  Commonly known as: DESYREL  Take 1 tablet (300 mg total) by mouth every evening.        STOP taking these medications    dicloxacillin 500 MG capsule  Commonly known as: DYNAPEN            Indwelling Lines/Drains at time of discharge:   Lines/Drains/Airways     Drain  Duration                Suprapubic Catheter 12/13/22 100% silicone 20 Fr. 111 days          Line  Duration                Subcutaneous Infusion Pump Abdomen (Comment) -- days                Time spent on the discharge of patient: 36 minutes         Nic Mistry MD  Department of Hospital Medicine  [facility]

## 2023-04-03 NOTE — PLAN OF CARE
Pt AAOx3. Medications administered per order. NC in use. Suprapubic catheter in place. Ambulates with walker and assist to restroom. Cardiac monitor maintained. Encouraged to call with questions/concerns/assistance. Safety.

## 2023-04-03 NOTE — PLAN OF CARE
Pt is AAOX4. Scheduled meds were given per MAR. No C/O pain or N/V. Family is not present at the bedside. Bed in lowest position, bed alarm is set. Call light within reach. Nurse will continue to monitor.

## 2023-04-04 ENCOUNTER — PATIENT MESSAGE (OUTPATIENT)
Dept: ADMINISTRATIVE | Facility: CLINIC | Age: 68
End: 2023-04-04
Payer: MEDICARE

## 2023-04-04 ENCOUNTER — PATIENT OUTREACH (OUTPATIENT)
Dept: ADMINISTRATIVE | Facility: CLINIC | Age: 68
End: 2023-04-04
Payer: MEDICARE

## 2023-04-04 NOTE — PROGRESS NOTES
2nd attempt to make TCC Call. Left voicemail. Please call [phone] leave your first and last name and date of birth for Emmanuelle. I will return your call.

## 2023-04-04 NOTE — PROGRESS NOTES
.C3 nurse attempted to contact Tasha Hawley for a TCC post hospital discharge follow up call. No answer. Left voicemail with callback information. The patient has a scheduled HOSFU appointment with Dr Imani Juarez on 4/13/23  @ 230pm.

## 2023-04-05 ENCOUNTER — PATIENT OUTREACH (OUTPATIENT)
Dept: ADMINISTRATIVE | Facility: OTHER | Age: 68
End: 2023-04-05
Payer: MEDICARE

## 2023-04-05 NOTE — PROGRESS NOTES
IP Liaison - Final Visit Note    Patient: Tasha Hawley  MRN:  [id number]  Date of Service:  4/5/2023  Completed by:  ML Weir    Reason for Visit   Patient presents with    IP Liaison Chart Review        Patient Summary     Discharge Date: 04/03/2023  Discharge telephone number/address: [phone] / [address] [place] [address]  Follow up provider: Imani Juarez MD / Kaitlynn Caro MD / Omaira Henriquez NP / Norma Pearson MD / Cami Romero MD  Follow up appointments: 4/13/2023 @ 2:30pm / 4/25/2023 @ 4:00pm / 4/28/2023 @ 1:40pm / 5/4/2023 @ 11:00am / 5/12/2023 @ 1:00pm  Home Health agency & telephone number: [facility]   DME ordered &  name: n/a  Assigned OPCM RN/SW: n/a  Report sent to follow up team (PCP/OPCM) via in basket message: n/a  Community Resources arranged including agency name & contact info: n/a      ML Weir

## 2023-04-05 NOTE — PROGRESS NOTES
C3 nurse spoke with Tasha Hawley for a TCC post hospital discharge follow up call. The patient has a scheduled HOSFU appointment with Imani Juarez MD on 4/13/23 @ 2:30pm.

## 2023-04-06 ENCOUNTER — TELEPHONE (OUTPATIENT)
Dept: UROLOGY | Facility: CLINIC | Age: 68
End: 2023-04-06
Payer: MEDICARE

## 2023-04-06 NOTE — TELEPHONE ENCOUNTER
----- Message from Shireen Mayo MD sent at 4/3/2023  2:51 PM CDT -----  Needs clearance from her primary or cardiologist.  Thanks.    ----- Message -----  From: Christi Wang LPN  Sent: 4/3/2023   1:57 PM CDT  To: Shireen Mayo MD      ----- Message -----  From: Torres Kaba MD  Sent: 4/3/2023   1:51 PM CDT  To: [facility] Staff    This patient was recently scheduled for botox injections but was cancelled because she was inpatient at [facility] for acute respiratory failure. Please ask Dr. Mayo if she would like this patient to follow up in clinic or be rescheduled for botox. Thanks!

## 2023-04-08 ENCOUNTER — TELEPHONE (OUTPATIENT)
Dept: ADMINISTRATIVE | Facility: CLINIC | Age: 68
End: 2023-04-08
Payer: MEDICARE

## 2023-04-10 ENCOUNTER — TELEPHONE (OUTPATIENT)
Dept: UROLOGY | Facility: CLINIC | Age: 68
End: 2023-04-10
Payer: MEDICARE

## 2023-04-10 NOTE — PROGRESS NOTES
Pt's  called into needing portable oxygen supplies. States afraid to leave house without enough Oxygen.  called last week for request. Pt has appt on 4/13/22 and will need portable oxygen. Called [facility] stated they will deliver tanks tomorrrow. Called back  and notified of scheduled delivery. Pt will call [employer] for possible portable O2 tank. Advised to let [facility] know if need new equipment

## 2023-04-13 ENCOUNTER — TELEPHONE (OUTPATIENT)
Dept: PRIMARY CARE CLINIC | Facility: CLINIC | Age: 68
End: 2023-04-13

## 2023-04-13 ENCOUNTER — TELEPHONE (OUTPATIENT)
Dept: UROLOGY | Facility: CLINIC | Age: 68
End: 2023-04-13
Payer: MEDICARE

## 2023-04-13 NOTE — TELEPHONE ENCOUNTER
"----- Message from Bryanna Crowder sent at 4/13/2023 11:21 AM CDT -----  Regarding: Botox  Contact: [phone]  Calling to speak with nurse regarding leaking "really bad" and needs Botox, and discuss plan of care. Please call.      "

## 2023-04-13 NOTE — TELEPHONE ENCOUNTER
Patient's spouse Dangelo called to inform clinic staff that pt not able to make appointment due to patient having symptoms of N/V and Shortness of Breath on 4L of NC. Pt's spouse also states that patient needs to be recertified for her home O2 equipment or the company will come pick it up. Informed pt's spouse that pt would have to be seen to get O2 equipment to be re certified.  also informed in which she advises patient to go to ER. Informed pt's spouse that  advise patient to go ER. Pt's spouse verbalized understanding. Pt's spouse to call office back to reschedule pt hfu appt.

## 2023-04-16 ENCOUNTER — DOCUMENT SCAN (OUTPATIENT)
Dept: HOME HEALTH SERVICES | Facility: HOSPITAL | Age: 68
End: 2023-04-16
Payer: MEDICARE

## 2023-04-17 ENCOUNTER — TELEPHONE (OUTPATIENT)
Dept: UROLOGY | Facility: CLINIC | Age: 68
End: 2023-04-17
Payer: MEDICARE

## 2023-04-17 ENCOUNTER — HOSPITAL ENCOUNTER (EMERGENCY)
Facility: HOSPITAL | Age: 68
Discharge: HOME | End: 2023-04-17
Attending: EMERGENCY MEDICINE
Payer: MEDICARE

## 2023-04-17 VITALS
SYSTOLIC BLOOD PRESSURE: 129 MMHG | DIASTOLIC BLOOD PRESSURE: 76 MMHG | HEART RATE: 65 BPM | BODY MASS INDEX: 35.08 KG/M2 | TEMPERATURE: 98 F | OXYGEN SATURATION: 100 % | WEIGHT: 224 LBS | RESPIRATION RATE: 18 BRPM

## 2023-04-17 DIAGNOSIS — R06.02 SHORTNESS OF BREATH: ICD-10-CM

## 2023-04-17 DIAGNOSIS — R60.0 BILATERAL LOWER EXTREMITY EDEMA: Primary | ICD-10-CM

## 2023-04-17 DIAGNOSIS — F41.1 GENERALIZED ANXIETY DISORDER: ICD-10-CM

## 2023-04-17 LAB
ALBUMIN SERPL BCP-MCNC: 3.6 G/DL (ref 3.5–5.2)
ALP SERPL-CCNC: 139 U/L (ref 55–135)
ALT SERPL W/O P-5'-P-CCNC: 9 U/L (ref 10–44)
ANION GAP SERPL CALC-SCNC: 14 MMOL/L (ref 8–16)
AST SERPL-CCNC: 14 U/L (ref 10–40)
BASOPHILS # BLD AUTO: 0.01 K/UL (ref 0–0.2)
BASOPHILS NFR BLD: 0.2 % (ref 0–1.9)
BILIRUB SERPL-MCNC: 0.3 MG/DL (ref 0.1–1)
BNP SERPL-MCNC: 30 PG/ML (ref 0–99)
BUN SERPL-MCNC: 6 MG/DL (ref 8–23)
CALCIUM SERPL-MCNC: 9.5 MG/DL (ref 8.7–10.5)
CHLORIDE SERPL-SCNC: 98 MMOL/L (ref 95–110)
CO2 SERPL-SCNC: 30 MMOL/L (ref 23–29)
CREAT SERPL-MCNC: 1 MG/DL (ref 0.5–1.4)
DIFFERENTIAL METHOD: ABNORMAL
EOSINOPHIL # BLD AUTO: 0.3 K/UL (ref 0–0.5)
EOSINOPHIL NFR BLD: 5.7 % (ref 0–8)
ERYTHROCYTE [DISTWIDTH] IN BLOOD BY AUTOMATED COUNT: 14.5 % (ref 11.5–14.5)
EST. GFR  (NO RACE VARIABLE): >60 ML/MIN/1.73 M^2
GLUCOSE SERPL-MCNC: 100 MG/DL (ref 70–110)
HCT VFR BLD AUTO: 33.3 % (ref 37–48.5)
HGB BLD-MCNC: 10.2 G/DL (ref 12–16)
IMM GRANULOCYTES # BLD AUTO: 0.01 K/UL (ref 0–0.04)
IMM GRANULOCYTES NFR BLD AUTO: 0.2 % (ref 0–0.5)
LYMPHOCYTES # BLD AUTO: 0.9 K/UL (ref 1–4.8)
LYMPHOCYTES NFR BLD: 16.7 % (ref 18–48)
MCH RBC QN AUTO: 26.7 PG (ref 27–31)
MCHC RBC AUTO-ENTMCNC: 30.6 G/DL (ref 32–36)
MCV RBC AUTO: 87 FL (ref 82–98)
MONOCYTES # BLD AUTO: 0.4 K/UL (ref 0.3–1)
MONOCYTES NFR BLD: 8.1 % (ref 4–15)
NEUTROPHILS # BLD AUTO: 3.7 K/UL (ref 1.8–7.7)
NEUTROPHILS NFR BLD: 69.1 % (ref 38–73)
NRBC BLD-RTO: 0 /100 WBC
PLATELET # BLD AUTO: 232 K/UL (ref 150–450)
PMV BLD AUTO: 9.8 FL (ref 9.2–12.9)
POTASSIUM SERPL-SCNC: 3.4 MMOL/L (ref 3.5–5.1)
PROT SERPL-MCNC: 8 G/DL (ref 6–8.4)
RBC # BLD AUTO: 3.82 M/UL (ref 4–5.4)
SODIUM SERPL-SCNC: 142 MMOL/L (ref 136–145)
TROPONIN I SERPL DL<=0.01 NG/ML-MCNC: <0.006 NG/ML (ref 0–0.03)
WBC # BLD AUTO: 5.4 K/UL (ref 3.9–12.7)

## 2023-04-17 PROCEDURE — 93010 ELECTROCARDIOGRAM REPORT: CPT | Mod: HCNC,,, | Performed by: INTERNAL MEDICINE

## 2023-04-17 PROCEDURE — 96375 TX/PRO/DX INJ NEW DRUG ADDON: CPT | Mod: HCNC

## 2023-04-17 PROCEDURE — 96374 THER/PROPH/DIAG INJ IV PUSH: CPT | Mod: HCNC

## 2023-04-17 PROCEDURE — 85025 COMPLETE CBC W/AUTO DIFF WBC: CPT | Mod: HCNC | Performed by: EMERGENCY MEDICINE

## 2023-04-17 PROCEDURE — 83880 ASSAY OF NATRIURETIC PEPTIDE: CPT | Mod: HCNC | Performed by: EMERGENCY MEDICINE

## 2023-04-17 PROCEDURE — 93005 ELECTROCARDIOGRAM TRACING: CPT | Mod: HCNC

## 2023-04-17 PROCEDURE — 80053 COMPREHEN METABOLIC PANEL: CPT | Mod: HCNC | Performed by: EMERGENCY MEDICINE

## 2023-04-17 PROCEDURE — 93010 EKG 12-LEAD: ICD-10-PCS | Mod: HCNC,,, | Performed by: INTERNAL MEDICINE

## 2023-04-17 PROCEDURE — 84484 ASSAY OF TROPONIN QUANT: CPT | Mod: HCNC | Performed by: EMERGENCY MEDICINE

## 2023-04-17 PROCEDURE — 99285 EMERGENCY DEPT VISIT HI MDM: CPT | Mod: 25,HCNC

## 2023-04-17 PROCEDURE — 63600175 PHARM REV CODE 636 W HCPCS: Mod: HCNC | Performed by: EMERGENCY MEDICINE

## 2023-04-17 RX ORDER — KETOROLAC TROMETHAMINE 30 MG/ML
15 INJECTION, SOLUTION INTRAMUSCULAR; INTRAVENOUS
Status: COMPLETED | OUTPATIENT
Start: 2023-04-17 | End: 2023-04-17

## 2023-04-17 RX ORDER — FUROSEMIDE 10 MG/ML
80 INJECTION INTRAMUSCULAR; INTRAVENOUS
Status: COMPLETED | OUTPATIENT
Start: 2023-04-17 | End: 2023-04-17

## 2023-04-17 RX ADMIN — KETOROLAC TROMETHAMINE 15 MG: 30 INJECTION, SOLUTION INTRAMUSCULAR; INTRAVENOUS at 04:04

## 2023-04-17 RX ADMIN — FUROSEMIDE 80 MG: 10 INJECTION, SOLUTION INTRAMUSCULAR; INTRAVENOUS at 02:04

## 2023-04-17 NOTE — ED NOTES
Patient identifiers verified and correct for Ms. Hawley  C/C: shortness of breath  APPEARANCE: awake and alert in NAD.  SKIN: warm, dry and intact. No breakdown or bruising.  MUSCULOSKELETAL: Patient moving all extremities spontaneously, no obvious swelling or deformities noted. Ambulates independently.  RESPIRATORY: shortness of breath noted.Respirations unlabored.   CARDIAC: Denies CP,  distal pulses; no peripheral edema  ABDOMEN: denies abdominal pain and n/v/d   : voids spontaneously, denies difficulty  Neurologic: AAO x 4; follows commands equal strength in all extremities; denies numbness/tingling. Denies dizziness

## 2023-04-17 NOTE — DISCHARGE INSTRUCTIONS
Thank you for coming in to see us at [facility]! It was nice to meet you, and I hope you feel better soon. Please feel free to return to the ER at any time should your symptoms get worse, or if you have different emergent concerns.    Our goal in the emergency department is to always give you outstanding care and exceptional service. You may receive a survey by mail or e-mail in the next week regarding your experience in our ED. We would greatly appreciate your completing and returning the survey. Your feedback provides us with a way to recognize our staff who give very good care and it helps us learn how to improve when your experience was below our aspiration of excellence.       Sincerely,    Maurice Fabian MD  Medical Director  Emergency Department  [facility]

## 2023-04-17 NOTE — TELEPHONE ENCOUNTER
----- Message from Marycruz Glover sent at 4/17/2023  2:41 PM CDT -----  Regarding: ER  Contact: Sandee @ [phone]  Sandee from [facility] is informing office that she sent pt to ER. Asking for a call back

## 2023-04-17 NOTE — ED PROVIDER NOTES
Encounter Date: 4/17/2023    SCRIBE #1 NOTE: I, Susi Layton, am scribing for, and in the presence of,  Maurice Fabian MD. I have scribed the entire note.     History     Chief Complaint   Patient presents with    Shortness of Breath     Pt presents with sob , and orange urin in her meadows bag      Tasha Hawley is a 67 y.o. female who has a past medical history of Anticoagulant long-term use, Anxiety, Arthritis, Bilateral lower extremity edema, Carotid artery occlusion, Cataract, CHF (congestive heart failure), Coronary artery disease, Depression, Fever blister, Hard of hearing, Hypokalemia (1/9/2023), Hyponatremia (2/4/2022), Hypothyroid, Iron deficiency anemia, Lumbar radiculopathy, Ocular migraine, Renal disorder, and Sleep apnea.    The patient presents to the ED for evaluation of edema onset of 2-3 days ago. She reports she has excess fluid in her chest, legs, and feet which is painful. Additionally, she complains of SOB and urinary retention prior to getting her meadows catheter changed today. Of note, she mentions she had 18 diapers over 3 days. She does ambulate wit her walker but has been unable to lately the pain and pressure on her feet. She endorses chronic back pain and is on a Dilaudid pain pump controlled by her pain management provider.         The history is provided by the patient.   Review of patient's allergies indicates:   Allergen Reactions    (d)-limonene flavor      Other reaction(s): difficult intubation  Other reaction(s): Difficulty breathing    Bactrim [sulfamethoxazole-trimethoprim] Anaphylaxis    Benadryl [diphenhydramine hcl] Shortness Of Breath    Fentanyl Itching, Nausea And Vomiting and Swelling             Imitrex [sumatriptan succinate] Shortness Of Breath    Percocet [oxycodone-acetaminophen] Shortness Of Breath    Topamax [topiramate] Shortness Of Breath    Vancomycin Anaphylaxis     Rash    Butorphanol tartrate     Darvocet a500 [propoxyphene n-acetaminophen]      Other  reaction(s): Difficulty breathing    Evening primrose (oenothera biennis)     Lyrica [pregabalin] Other (See Comments)     tremors    White petrolatum-zinc     Zinc oxide-white petrolatum      Other reaction(s): Difficulty breathing    Latex, natural rubber Itching and Rash    Phenytoin Rash and Other (See Comments)     Trouble breathing     Past Medical History:   Diagnosis Date    Anticoagulant long-term use     Anxiety     Arthritis     Bilateral lower extremity edema     severe chronic    Carotid artery occlusion     Cataract     CHF (congestive heart failure)     Coronary artery disease     subtotalled LAD with collateral    Depression     Fever blister     Hard of hearing     Hypokalemia 1/9/2023    Hyponatremia 2/4/2022    Hypothyroid     Iron deficiency anemia     Lumbar radiculopathy     with chronic pain    Ocular migraine     Renal disorder     Sleep apnea     cpap     Past Surgical History:   Procedure Laterality Date    ADENOIDECTOMY      BACK SURGERY      x 12    CARDIAC CATHETERIZATION  2016    subtotalled LAD with right to left collaterals    CATARACT EXTRACTION W/  INTRAOCULAR LENS IMPLANT Left     Dr Coleman     CYSTOSCOPIC LITHOLAPAXY N/A 6/27/2019    Procedure: CYSTOLITHOLAPAXY;  Surgeon: Shireen Mayo MD;  Location: [facility] OR [address] [place] [address];  Service: Urology;  Laterality: N/A;    CYSTOSCOPIC LITHOLAPAXY N/A 9/3/2019    Procedure: CYSTOLITHOLAPAXY;  Surgeon: Shireen Mayo MD;  Location: [facility] OR 2ND FLR;  Service: Urology;  Laterality: N/A;    CYSTOSCOPY N/A 7/13/2021    Procedure: CYSTOSCOPY;  Surgeon: Shireen Mayo MD;  [facility];  Service: Urology;  Laterality: N/A;    CYSTOSCOPY  11/16/2021    Procedure: CYSTOSCOPY;  Surgeon: Shireen Mayo MD;  [facility];  Service: Urology;;    CYSTOSCOPY  7/19/2022    Procedure: CYSTOSCOPY;  Surgeon: Shireen Mayo MD;  [facility];  Service: Urology;;    CYSTOSCOPY WITH INJECTION OF PERIURETHRAL  BULKING AGENT  7/19/2022    Procedure: CYSTOSCOPY, WITH PERIURETHRAL BULKING AGENT INJECTION-MACROPLASTIQUE;  Surgeon: Shireen Mayo MD;  [facility];  Service: Urology;;    CYSTOSCOPY WITH INJECTION OF PERIURETHRAL BULKING AGENT N/A 3/28/2023    Procedure: CYSTOSCOPY, WITH PERIURETHRAL BULKING AGENT INJECTION;  Surgeon: Shireen Mayo MD;  [facility];  Service: Urology;  Laterality: N/A;  [place]    CYSTOSCOPY,WITH BOTULINUM TOXIN INJECTION N/A 12/13/2022    Procedure: CYSTOSCOPY,WITH BOTULINUM TOXIN INJECTION;  Surgeon: Shireen Mayo MD;  [facility];  Service: Urology;  Laterality: N/A;  300 U    CYSTOSCOPY,WITH BOTULINUM TOXIN INJECTION N/A 3/28/2023    Procedure: CYSTOSCOPY,WITH BOTULINUM TOXIN INJECTION;  Surgeon: Shireen Mayo MD;  [facility];  Service: Urology;  Laterality: N/A;  45 MIN.    300 UNITS    ESOPHAGOGASTRODUODENOSCOPY N/A 5/23/2018    Procedure: ESOPHAGOGASTRODUODENOSCOPY (EGD);  Surgeon: Prince Vance MD;  [facility] ([facility]);  Service: Endoscopy;  Laterality: N/A;  r/s 'd per Dr. Vance due to family emergency- ER    HYSTERECTOMY  1975    endometriosis    INJECTION OF BOTULINUM TOXIN TYPE A  7/13/2021    Procedure: INJECTION, BOTULINUM TOXIN, 200units;  Surgeon: Shireen Mayo MD;  [facility];  Service: Urology;;    INJECTION OF BOTULINUM TOXIN TYPE A  11/16/2021    Procedure: INJECTION, BOTULINUM TOXIN, 200units;  Surgeon: Shireen Mayo MD;  [facility];  Service: Urology;;    INJECTION OF BOTULINUM TOXIN TYPE A  7/19/2022    Procedure: INJECTION, BOTULINUM TOXIN, 300 units ;  Surgeon: Shireen Mayo MD;  [facility];  Service: Urology;;    INSERTION, SUPRAPUBIC CATHETER N/A 12/13/2022    Procedure: INSERTION, SUPRAPUBIC CATHETER;  Surgeon: Shireen Mayo MD;  Location: [facility] OR [address] [place] [address];  Service: Urology;  Laterality: N/A;  exchange    pain pump  placement      SQ Dilaudid Pump managed by Dr. Hillman, Pain Management    REMOVAL OF BONE SPUR OF FOOT Bilateral 9/16/2022    Procedure: EXCISION ARTHRITIC BONE, BILATERAL FOOT;  Surgeon: Adam Mcguire DPM;  Location: [facility];  Service: Podiatry;  Laterality: Bilateral;    REPLACEMENT OF CATHETER N/A 10/31/2019    Procedure: REPLACEMENT, CATHETER-SUPRAPUBIC;  Surgeon: Shireen Mayo MD;  Location: [address];  Service: Urology;  Laterality: N/A;    SPINAL CORD STIMULATOR REMOVAL      before [place]    SPINE SURGERY  5-13-13    CERVICAL FUSION    TONSILLECTOMY       Family History   Problem Relation Age of Onset    Cancer Mother 55        breast    Cancer Father         esophagus,had laryngectomy    Esophageal cancer Father     Parkinsonism Maternal Grandmother     Tremor Maternal Grandmother     No Known Problems Brother     No Known Problems Brother     Heart disease Maternal Uncle     Colon cancer Maternal Uncle         Less than 60    No Known Problems Sister     No Known Problems Maternal Aunt     Cirrhosis Paternal Aunt         ETOH    Liver disease Paternal Aunt         ETOH    Liver disease Paternal Uncle         ETOH    Cirrhosis Paternal Uncle         ETOH    No Known Problems Maternal Grandfather     No Known Problems Paternal Grandmother     No Known Problems Paternal Grandfather     Melanoma Neg Hx     Amblyopia Neg Hx     Blindness Neg Hx     Cataracts Neg Hx     Diabetes Neg Hx     Glaucoma Neg Hx     Hypertension Neg Hx     Macular degeneration Neg Hx     Retinal detachment Neg Hx     Strabismus Neg Hx     Stroke Neg Hx     Thyroid disease Neg Hx     Celiac disease Neg Hx     Colon polyps Neg Hx     Cystic fibrosis Neg Hx     Crohn's disease Neg Hx     Inflammatory bowel disease Neg Hx     Liver cancer Neg Hx     Rectal cancer Neg Hx     Stomach cancer Neg Hx     Ulcerative colitis Neg Hx     Lymphoma Neg Hx      Social History     Tobacco Use    Smoking status: Never    Smokeless  tobacco: Never   Substance Use Topics    Alcohol use: Never    Drug use: No     Review of Systems   Respiratory:  Positive for shortness of breath.    Cardiovascular:  Positive for leg swelling.   Genitourinary:         Urinary retention   Musculoskeletal:  Positive for back pain.   All other systems reviewed and are negative.    Physical Exam     Initial Vitals [04/17/23 1302]   BP Pulse Resp Temp SpO2   (!) 126/90 90 18 98 °F (36.7 °C) (!) 94 %      MAP       --         Physical Exam    Nursing note and vitals reviewed.  Constitutional: She appears well-developed and well-nourished. She is not diaphoretic. No distress.   HENT:   Head: Normocephalic and atraumatic.   Mouth/Throat: Oropharynx is clear and moist. No oropharyngeal exudate.   Eyes: Conjunctivae are normal. Pupils are equal, round, and reactive to light. Right eye exhibits no discharge. Left eye exhibits no discharge. No scleral icterus.   Neck: Neck supple.   Normal range of motion.  Cardiovascular:  Normal rate, regular rhythm, normal heart sounds and intact distal pulses.     Exam reveals no gallop and no friction rub.       No murmur heard.  Pulmonary/Chest: Breath sounds normal. No respiratory distress. She has no wheezes. She has no rhonchi. She has no rales.   Abdominal: Abdomen is soft. Bowel sounds are normal. She exhibits no distension. There is no abdominal tenderness.   Musculoskeletal:         General: Tenderness (bilat legs) and edema (3+ bilateral lower ext) present. Normal range of motion.      Cervical back: Normal range of motion and neck supple.     Lymphadenopathy:     She has no cervical adenopathy.   Neurological: She is alert and oriented to person, place, and time. She has normal strength. GCS score is 15. GCS eye subscore is 4. GCS verbal subscore is 5. GCS motor subscore is 6.   Skin: Skin is warm and dry. Capillary refill takes less than 2 seconds. No rash noted. No erythema.   Psychiatric: She has a normal mood and affect.  Thought content normal.       ED Course   Procedures  Labs Reviewed   CBC W/ AUTO DIFFERENTIAL - Abnormal; Notable for the following components:       Result Value    RBC 3.82 (*)     Hemoglobin 10.2 (*)     Hematocrit 33.3 (*)     MCH 26.7 (*)     MCHC 30.6 (*)     Lymph # 0.9 (*)     Lymph % 16.7 (*)     All other components within normal limits   COMPREHENSIVE METABOLIC PANEL - Abnormal; Notable for the following components:    Potassium 3.4 (*)     CO2 30 (*)     BUN 6 (*)     Alkaline Phosphatase 139 (*)     ALT 9 (*)     All other components within normal limits   TROPONIN I   B-TYPE NATRIURETIC PEPTIDE     EKG Readings: (Independently Interpreted)   Initial Reading: No STEMI. Rhythm: Normal Sinus Rhythm. Heart Rate: 65. Ectopy: No Ectopy. Conduction: QTc 507ms. ST Segments: Normal ST Segments. T Waves: Normal. Axis: Normal. Clinical Impression: Normal Sinus Rhythm     ECG Results              EKG 12-lead (Final result)  Result time 04/17/23 20:59:51      Final result by Interface, Lab In [facility] (04/17/23 20:59:51)                   Narrative:    Test Reason : R06.02,    Vent. Rate : 065 BPM     Atrial Rate : 065 BPM     P-R Int : 174 ms          QRS Dur : 114 ms      QT Int : 488 ms       P-R-T Axes : 043 -19 023 degrees     QTc Int : 507 ms    Normal sinus rhythm  Cannot rule out Anterior infarct (cited on or before 30-MAR-2023)  Abnormal ECG  When compared with ECG of 30-MAR-2023 10:44,  QT has lengthened  Confirmed by Nick Lozano MD ([id number]) on 4/17/2023 8:59:39 PM    Referred By: System System           Confirmed By:Nick Lozano MD                                  Imaging Results              X-Ray Chest AP Portable (Final result)  Result time 04/17/23 14:57:32      Final result by Donavan Hreedia III, MD (04/17/23 14:57:32)                   Impression:      No acute process seen.      Electronically signed by: Donavan Heredia MD  Date:    04/17/2023  Time:    14:57               Narrative:     EXAMINATION:  XR CHEST AP PORTABLE    CLINICAL HISTORY:  CHF;    FINDINGS:  Chest one view: There is postoperative change of the spine.  Heart size is upper normal.  There is DJD and aortic plaque.  Lungs are clear.                                    X-Rays:   Independently Interpreted Readings:   Chest X-Ray: No infiltrates.  No acute abnormalities.  Normal heart size.   Medications   furosemide injection 80 mg (80 mg Intravenous Given 4/17/23 1431)   ketorolac injection 15 mg (15 mg Intravenous Given 4/17/23 1630)     Medical Decision Making:   History:   Old Records Summarized: other records.  Clinical Tests:   Lab Tests: Ordered and Reviewed  Radiological Study: Ordered and Reviewed  Medical Tests: Ordered and Reviewed                 Medical Decision Making  This is an emergent evaluation of a 67 y.o.female patient with presentation of bilateral leg swelling x1 week, hx of similar in the past, on diuretics and taking as prescribed. Non-compliant with diet     Problems Addressed:  Bilateral lower extremity edema: chronic illness or injury with exacerbation, progression, or side effects of treatment     Details: worsened over the past few days  Generalized anxiety disorder: chronic illness or injury  Shortness of breath: undiagnosed new problem with uncertain prognosis     Details: DDx: pneumonia, COPD, CHF, pleural effusion, pericardial effusion, pulmonary edema, pneumothorax, lung mass    Amount and/or Complexity of Data Reviewed  Labs: ordered.     Details: cbc, cmp, bnp, trop  Radiology: ordered and independent interpretation performed.     Details: CXR  ECG/medicine tests: ordered and independent interpretation performed.    Risk  Prescription drug management.  Diagnosis or treatment significantly limited by social determinants of health.  Risk Details: Pt given IV lasix 80mg with adequate diuresis. No CARITO on labs.    Advised pt to continue 80mg PO daily x2 days, then return to regular dosing.      I doubt  that the patient has acute pulm edema requiring further IV diuresis.     Pt is otherwise stable for discharge at this time.    Discharge process:  -- Clinical impression and plan including any treatment was discussed with patient.   -- Pt is to call for follow up with PCP or referred physician in 7 days.   -- Pt is to return to the ED immediately for any new or worsening symptoms.  -- They are always welcome to return for any emergent concerns.    -- The patient had time for ask questions to which they were addressed.   -- The patient expressed understanding and agrees with my plan.        Clinical Impression:   Final diagnoses:  [R06.02] Shortness of breath  [R60.0] Bilateral lower extremity edema (Primary)  [F41.1] Generalized anxiety disorder        ED Disposition Condition    Discharge Stable          ED Prescriptions    None       Follow-up Information       Follow up With Specialties Details Why Contact Info    Mesfin Hodges II, MD Internal Medicine Schedule an appointment as soon as possible for a visit   [address]  [place] [address]  [phone]              I, Dr. Maurice Fabian, personally performed the services described in this documentation. All medical record entries made by the scribe were at my direction and in my presence. I have reviewed the chart and agree that the record is accurate and complete.   Maurice Fabian MD.       Maurice Fabian MD  04/18/23 2413

## 2023-04-17 NOTE — TELEPHONE ENCOUNTER
Returned call Sandee mike/ [facility]. States she wants to notify Dr. Mayo that she sent pt to ER for exasperation of CHF, bilateral edema and fluid overload.

## 2023-04-19 ENCOUNTER — EXTERNAL HOME HEALTH (OUTPATIENT)
Dept: HOME HEALTH SERVICES | Facility: HOSPITAL | Age: 68
End: 2023-04-19
Payer: MEDICARE

## 2023-04-20 ENCOUNTER — TELEPHONE (OUTPATIENT)
Dept: GASTROENTEROLOGY | Facility: CLINIC | Age: 68
End: 2023-04-20
Payer: MEDICARE

## 2023-04-20 ENCOUNTER — TELEPHONE (OUTPATIENT)
Dept: UROLOGY | Facility: CLINIC | Age: 68
End: 2023-04-20
Payer: MEDICARE

## 2023-04-20 NOTE — TELEPHONE ENCOUNTER
----- Message from Parvin Lara MA sent at 4/19/2023  9:11 PM CDT -----  Contact: pt    ----- Message -----  From: Prince Vance MD  Sent: 4/19/2023   4:10 PM CDT  To: Parvin Lara MA    Dahlia can you schedule her a telemedicine video visit with me early next week to discuss thank you  ----- Message -----  From: Parvin Lara MA  Sent: 4/19/2023   3:33 PM CDT  To: Prince Vance MD      ----- Message -----  From: Valentino Esquivel  Sent: 4/19/2023   2:57 PM CDT  To: Pinky YEN Staff    Pt requesting call back RE: would like to schedule appt due to having trouble swallowing  nothing available in epic    Confirmed contact below:  Contact Name:Tasha Hawley  Phone Number: [phone]

## 2023-04-20 NOTE — TELEPHONE ENCOUNTER
----- Message from Zoe Mcgarry LPN sent at 4/20/2023 12:33 PM CDT -----  Regarding: FW: Pt Advice  Contact: [phone]    ----- Message -----  From: Cora Garcia  Sent: 4/20/2023  11:18 AM CDT  To: Maria Esther Iyer Staff  Subject: Pt Advice                                        Pt is calling to speak with Dr Mayo about urinary leakage.  Please call.

## 2023-04-21 ENCOUNTER — TELEPHONE (OUTPATIENT)
Dept: PRIMARY CARE CLINIC | Facility: CLINIC | Age: 68
End: 2023-04-21
Payer: MEDICARE

## 2023-04-21 DIAGNOSIS — J96.01 ACUTE RESPIRATORY FAILURE WITH HYPOXIA AND HYPERCARBIA: Primary | ICD-10-CM

## 2023-04-21 DIAGNOSIS — J96.02 ACUTE RESPIRATORY FAILURE WITH HYPOXIA AND HYPERCARBIA: Primary | ICD-10-CM

## 2023-04-21 PROCEDURE — G0179 MD RECERTIFICATION HHA PT: HCPCS | Mod: ,,, | Performed by: UROLOGY

## 2023-04-21 PROCEDURE — G0179 PR HOME HEALTH MD RECERTIFICATION: ICD-10-PCS | Mod: ,,, | Performed by: UROLOGY

## 2023-04-21 NOTE — TELEPHONE ENCOUNTER
pt c/o leaking urine. Does not feel she has an infection. Advised that Dr. Mayo needs her to have cardiology clearance and to f/u w/ PCP prior to scheduling botox procedure. Offered AV visit today to discuss, pt states she doesn't need it. She is going to get clearance now w/ Internal Medicine. Reminded her she needs cardiology clearance, as well, not scheduled until 5/9/23 at 1:40pm at [facility] clinic, pt notified of this information and verbalized understanding.

## 2023-04-21 NOTE — TELEPHONE ENCOUNTER
----- Message from Betito Nicholas sent at 4/21/2023 10:28 AM CDT -----  Regarding: adv  Contact: @[phone]  Pt rc viviana costa... pls call and adv@[phone]

## 2023-04-24 DIAGNOSIS — N31.9 NEUROGENIC BLADDER: Chronic | ICD-10-CM

## 2023-04-24 RX ORDER — OXYBUTYNIN CHLORIDE 15 MG/1
15 TABLET, EXTENDED RELEASE ORAL DAILY
Qty: 90 TABLET | Refills: 3 | OUTPATIENT
Start: 2023-04-24

## 2023-04-26 ENCOUNTER — TELEPHONE (OUTPATIENT)
Dept: INTERNAL MEDICINE | Facility: CLINIC | Age: 68
End: 2023-04-26
Payer: MEDICARE

## 2023-04-28 ENCOUNTER — HOSPITAL ENCOUNTER (INPATIENT)
Facility: HOSPITAL | Age: 68
LOS: 7 days | Discharge: HOME | DRG: 871 | End: 2023-05-05
Attending: EMERGENCY MEDICINE | Admitting: HOSPITALIST
Payer: MEDICARE

## 2023-04-28 DIAGNOSIS — J96.02 ACUTE RESPIRATORY FAILURE WITH HYPOXIA AND HYPERCARBIA: ICD-10-CM

## 2023-04-28 DIAGNOSIS — J18.9 PNEUMONIA OF BOTH LUNGS DUE TO INFECTIOUS ORGANISM, UNSPECIFIED PART OF LUNG: Primary | ICD-10-CM

## 2023-04-28 DIAGNOSIS — J96.01 ACUTE RESPIRATORY FAILURE WITH HYPOXIA AND HYPERCARBIA: ICD-10-CM

## 2023-04-28 DIAGNOSIS — J18.9 MULTIFOCAL PNEUMONIA: ICD-10-CM

## 2023-04-28 DIAGNOSIS — R06.02 SHORTNESS OF BREATH: ICD-10-CM

## 2023-04-28 DIAGNOSIS — G47.01 INSOMNIA DUE TO MEDICAL CONDITION: Chronic | ICD-10-CM

## 2023-04-28 DIAGNOSIS — R07.89 CHEST PRESSURE: ICD-10-CM

## 2023-04-28 PROBLEM — R65.21 SEPTIC SHOCK: Status: ACTIVE | Noted: 2023-04-28

## 2023-04-28 PROBLEM — A41.9 SEPTIC SHOCK: Status: ACTIVE | Noted: 2023-04-28

## 2023-04-28 PROBLEM — G93.41 SEPSIS WITH ENCEPHALOPATHY AND SEPTIC SHOCK: Status: ACTIVE | Noted: 2023-04-28

## 2023-04-28 LAB
ALBUMIN SERPL BCP-MCNC: 3.1 G/DL (ref 3.5–5.2)
ALP SERPL-CCNC: 123 U/L (ref 55–135)
ALT SERPL W/O P-5'-P-CCNC: 6 U/L (ref 10–44)
AMORPH CRY URNS QL MICRO: ABNORMAL
ANION GAP SERPL CALC-SCNC: 12 MMOL/L (ref 8–16)
AST SERPL-CCNC: 13 U/L (ref 10–40)
BACTERIA #/AREA URNS HPF: ABNORMAL /HPF
BASOPHILS # BLD AUTO: 0 K/UL (ref 0–0.2)
BASOPHILS NFR BLD: 0 % (ref 0–1.9)
BILIRUB SERPL-MCNC: 0.4 MG/DL (ref 0.1–1)
BILIRUB UR QL STRIP: NEGATIVE
BNP SERPL-MCNC: 75 PG/ML (ref 0–99)
BUN SERPL-MCNC: 7 MG/DL (ref 8–23)
CALCIUM SERPL-MCNC: 9.4 MG/DL (ref 8.7–10.5)
CHLORIDE SERPL-SCNC: 96 MMOL/L (ref 95–110)
CLARITY UR: ABNORMAL
CO2 SERPL-SCNC: 34 MMOL/L (ref 23–29)
COLOR UR: YELLOW
CREAT SERPL-MCNC: 0.9 MG/DL (ref 0.5–1.4)
CTP QC/QA: YES
CTP QC/QA: YES
DIFFERENTIAL METHOD: ABNORMAL
EOSINOPHIL # BLD AUTO: 0.1 K/UL (ref 0–0.5)
EOSINOPHIL NFR BLD: 0.8 % (ref 0–8)
ERYTHROCYTE [DISTWIDTH] IN BLOOD BY AUTOMATED COUNT: 14.4 % (ref 11.5–14.5)
EST. GFR  (NO RACE VARIABLE): >60 ML/MIN/1.73 M^2
GLUCOSE SERPL-MCNC: 112 MG/DL (ref 70–110)
GLUCOSE UR QL STRIP: NEGATIVE
HCT VFR BLD AUTO: 31.8 % (ref 37–48.5)
HGB BLD-MCNC: 9.7 G/DL (ref 12–16)
HGB UR QL STRIP: ABNORMAL
IMM GRANULOCYTES # BLD AUTO: 0.05 K/UL (ref 0–0.04)
IMM GRANULOCYTES NFR BLD AUTO: 0.5 % (ref 0–0.5)
KETONES UR QL STRIP: NEGATIVE
LACTATE SERPL-SCNC: 1.2 MMOL/L (ref 0.5–2.2)
LACTATE SERPL-SCNC: 2.6 MMOL/L (ref 0.5–2.2)
LEUKOCYTE ESTERASE UR QL STRIP: ABNORMAL
LYMPHOCYTES # BLD AUTO: 0.6 K/UL (ref 1–4.8)
LYMPHOCYTES NFR BLD: 5.2 % (ref 18–48)
MCH RBC QN AUTO: 26.8 PG (ref 27–31)
MCHC RBC AUTO-ENTMCNC: 30.5 G/DL (ref 32–36)
MCV RBC AUTO: 88 FL (ref 82–98)
MICROSCOPIC COMMENT: ABNORMAL
MONOCYTES # BLD AUTO: 0.4 K/UL (ref 0.3–1)
MONOCYTES NFR BLD: 4.1 % (ref 4–15)
NEUTROPHILS # BLD AUTO: 9.6 K/UL (ref 1.8–7.7)
NEUTROPHILS NFR BLD: 89.4 % (ref 38–73)
NITRITE UR QL STRIP: NEGATIVE
NRBC BLD-RTO: 0 /100 WBC
PH UR STRIP: 8 [PH] (ref 5–8)
PLATELET # BLD AUTO: 290 K/UL (ref 150–450)
PMV BLD AUTO: 10.3 FL (ref 9.2–12.9)
POC MOLECULAR INFLUENZA A AGN: NEGATIVE
POC MOLECULAR INFLUENZA B AGN: NEGATIVE
POTASSIUM SERPL-SCNC: 3.2 MMOL/L (ref 3.5–5.1)
PROCALCITONIN SERPL IA-MCNC: 0.04 NG/ML
PROT SERPL-MCNC: 7.2 G/DL (ref 6–8.4)
PROT UR QL STRIP: NEGATIVE
RBC # BLD AUTO: 3.62 M/UL (ref 4–5.4)
RBC #/AREA URNS HPF: 3 /HPF (ref 0–4)
SARS-COV-2 RDRP RESP QL NAA+PROBE: NEGATIVE
SODIUM SERPL-SCNC: 142 MMOL/L (ref 136–145)
SP GR UR STRIP: 1 (ref 1–1.03)
TROPONIN I SERPL DL<=0.01 NG/ML-MCNC: 0.01 NG/ML (ref 0–0.03)
TSH SERPL DL<=0.005 MIU/L-ACNC: 3.3 UIU/ML (ref 0.4–4)
URN SPEC COLLECT METH UR: ABNORMAL
UROBILINOGEN UR STRIP-ACNC: NEGATIVE EU/DL
WBC # BLD AUTO: 10.77 K/UL (ref 3.9–12.7)
WBC #/AREA URNS HPF: 8 /HPF (ref 0–5)

## 2023-04-28 PROCEDURE — 96361 HYDRATE IV INFUSION ADD-ON: CPT | Mod: HCNC

## 2023-04-28 PROCEDURE — 99291 CRITICAL CARE FIRST HOUR: CPT | Mod: 25,HCNC

## 2023-04-28 PROCEDURE — 25000242 PHARM REV CODE 250 ALT 637 W/ HCPCS: Mod: HCNC | Performed by: HOSPITALIST

## 2023-04-28 PROCEDURE — 94799 UNLISTED PULMONARY SVC/PX: CPT | Mod: HCNC

## 2023-04-28 PROCEDURE — 96365 THER/PROPH/DIAG IV INF INIT: CPT | Mod: HCNC

## 2023-04-28 PROCEDURE — 25000242 PHARM REV CODE 250 ALT 637 W/ HCPCS: Mod: HCNC | Performed by: EMERGENCY MEDICINE

## 2023-04-28 PROCEDURE — 83605 ASSAY OF LACTIC ACID: CPT | Mod: 91,HCNC | Performed by: EMERGENCY MEDICINE

## 2023-04-28 PROCEDURE — 27000221 HC OXYGEN, UP TO 24 HOURS: Mod: HCNC

## 2023-04-28 PROCEDURE — 87081 CULTURE SCREEN ONLY: CPT | Mod: 91,HCNC | Performed by: STUDENT IN AN ORGANIZED HEALTH CARE EDUCATION/TRAINING PROGRAM

## 2023-04-28 PROCEDURE — 63600175 PHARM REV CODE 636 W HCPCS: Mod: HCNC | Performed by: EMERGENCY MEDICINE

## 2023-04-28 PROCEDURE — 87070 CULTURE OTHR SPECIMN AEROBIC: CPT | Mod: HCNC | Performed by: HOSPITALIST

## 2023-04-28 PROCEDURE — 25000003 PHARM REV CODE 250: Mod: HCNC | Performed by: HOSPITALIST

## 2023-04-28 PROCEDURE — 99292 CRITICAL CARE ADDL 30 MIN: CPT | Mod: 25,HCNC

## 2023-04-28 PROCEDURE — 85025 COMPLETE CBC W/AUTO DIFF WBC: CPT | Mod: HCNC | Performed by: EMERGENCY MEDICINE

## 2023-04-28 PROCEDURE — 84145 PROCALCITONIN (PCT): CPT | Mod: HCNC | Performed by: EMERGENCY MEDICINE

## 2023-04-28 PROCEDURE — 93010 ELECTROCARDIOGRAM REPORT: CPT | Mod: HCNC,,, | Performed by: INTERNAL MEDICINE

## 2023-04-28 PROCEDURE — 81000 URINALYSIS NONAUTO W/SCOPE: CPT | Mod: HCNC | Performed by: EMERGENCY MEDICINE

## 2023-04-28 PROCEDURE — 94761 N-INVAS EAR/PLS OXIMETRY MLT: CPT | Mod: HCNC

## 2023-04-28 PROCEDURE — 83880 ASSAY OF NATRIURETIC PEPTIDE: CPT | Mod: HCNC | Performed by: EMERGENCY MEDICINE

## 2023-04-28 PROCEDURE — 87040 BLOOD CULTURE FOR BACTERIA: CPT | Mod: 59,HCNC | Performed by: EMERGENCY MEDICINE

## 2023-04-28 PROCEDURE — 87899 AGENT NOS ASSAY W/OPTIC: CPT | Mod: HCNC | Performed by: HOSPITALIST

## 2023-04-28 PROCEDURE — 84484 ASSAY OF TROPONIN QUANT: CPT | Mod: HCNC | Performed by: EMERGENCY MEDICINE

## 2023-04-28 PROCEDURE — 20000000 HC ICU ROOM: Mod: HCNC

## 2023-04-28 PROCEDURE — 93005 ELECTROCARDIOGRAM TRACING: CPT | Mod: HCNC

## 2023-04-28 PROCEDURE — 87449 NOS EACH ORGANISM AG IA: CPT | Mod: HCNC | Performed by: HOSPITALIST

## 2023-04-28 PROCEDURE — 84443 ASSAY THYROID STIM HORMONE: CPT | Mod: HCNC | Performed by: EMERGENCY MEDICINE

## 2023-04-28 PROCEDURE — A4216 STERILE WATER/SALINE, 10 ML: HCPCS | Mod: HCNC | Performed by: HOSPITALIST

## 2023-04-28 PROCEDURE — 63600175 PHARM REV CODE 636 W HCPCS: Mod: HCNC | Performed by: HOSPITALIST

## 2023-04-28 PROCEDURE — 87081 CULTURE SCREEN ONLY: CPT | Mod: HCNC | Performed by: HOSPITALIST

## 2023-04-28 PROCEDURE — 87205 SMEAR GRAM STAIN: CPT | Mod: HCNC | Performed by: HOSPITALIST

## 2023-04-28 PROCEDURE — 25000003 PHARM REV CODE 250: Mod: HCNC | Performed by: EMERGENCY MEDICINE

## 2023-04-28 PROCEDURE — 25000003 PHARM REV CODE 250: Mod: HCNC

## 2023-04-28 PROCEDURE — 93010 EKG 12-LEAD: ICD-10-PCS | Mod: HCNC,,, | Performed by: INTERNAL MEDICINE

## 2023-04-28 PROCEDURE — 94640 AIRWAY INHALATION TREATMENT: CPT | Mod: HCNC

## 2023-04-28 PROCEDURE — 80053 COMPREHEN METABOLIC PANEL: CPT | Mod: HCNC | Performed by: EMERGENCY MEDICINE

## 2023-04-28 RX ORDER — FLUTICASONE PROPIONATE 50 MCG
2 SPRAY, SUSPENSION (ML) NASAL DAILY
Status: DISCONTINUED | OUTPATIENT
Start: 2023-04-28 | End: 2023-05-05 | Stop reason: HOSPADM

## 2023-04-28 RX ORDER — SENNOSIDES 8.6 MG/1
2 TABLET ORAL 2 TIMES DAILY
Status: DISCONTINUED | OUTPATIENT
Start: 2023-04-28 | End: 2023-05-05 | Stop reason: HOSPADM

## 2023-04-28 RX ORDER — LEVOTHYROXINE SODIUM 75 UG/1
150 TABLET ORAL
Status: DISCONTINUED | OUTPATIENT
Start: 2023-04-29 | End: 2023-05-05 | Stop reason: HOSPADM

## 2023-04-28 RX ORDER — ATORVASTATIN CALCIUM 40 MG/1
80 TABLET, FILM COATED ORAL DAILY
Status: DISCONTINUED | OUTPATIENT
Start: 2023-04-29 | End: 2023-05-05 | Stop reason: HOSPADM

## 2023-04-28 RX ORDER — DICLOXACILLIN SODIUM 500 MG/1
CAPSULE ORAL
COMMUNITY
End: 2023-04-28

## 2023-04-28 RX ORDER — SODIUM CHLORIDE 0.9 % (FLUSH) 0.9 %
10 SYRINGE (ML) INJECTION EVERY 8 HOURS
Status: DISCONTINUED | OUTPATIENT
Start: 2023-04-28 | End: 2023-05-03

## 2023-04-28 RX ORDER — AZITHROMYCIN 250 MG/1
500 TABLET, FILM COATED ORAL DAILY
Status: DISCONTINUED | OUTPATIENT
Start: 2023-04-28 | End: 2023-04-28

## 2023-04-28 RX ORDER — PANTOPRAZOLE SODIUM 40 MG/1
40 TABLET, DELAYED RELEASE ORAL DAILY
Status: DISCONTINUED | OUTPATIENT
Start: 2023-04-28 | End: 2023-05-05 | Stop reason: HOSPADM

## 2023-04-28 RX ORDER — HYDROCODONE BITARTRATE AND ACETAMINOPHEN 10; 325 MG/1; MG/1
1 TABLET ORAL EVERY 6 HOURS PRN
Status: DISCONTINUED | OUTPATIENT
Start: 2023-04-28 | End: 2023-04-29

## 2023-04-28 RX ORDER — GLUCAGON 1 MG
1 KIT INJECTION
Status: DISCONTINUED | OUTPATIENT
Start: 2023-04-28 | End: 2023-05-05 | Stop reason: HOSPADM

## 2023-04-28 RX ORDER — LIDOCAINE 50 MG/G
PATCH TOPICAL
COMMUNITY
End: 2023-04-28 | Stop reason: SDUPTHER

## 2023-04-28 RX ORDER — NOREPINEPHRINE BITARTRATE/D5W 4MG/250ML
0-3 PLASTIC BAG, INJECTION (ML) INTRAVENOUS CONTINUOUS
Status: DISCONTINUED | OUTPATIENT
Start: 2023-04-28 | End: 2023-04-29

## 2023-04-28 RX ORDER — OXYBUTYNIN CHLORIDE 5 MG/1
10 TABLET, EXTENDED RELEASE ORAL DAILY
Status: DISCONTINUED | OUTPATIENT
Start: 2023-04-28 | End: 2023-05-05 | Stop reason: HOSPADM

## 2023-04-28 RX ORDER — TIZANIDINE 4 MG/1
4 TABLET ORAL 2 TIMES DAILY PRN
Status: ON HOLD | COMMUNITY
End: 2023-09-10

## 2023-04-28 RX ORDER — POTASSIUM CHLORIDE 20 MEQ/1
20 TABLET, EXTENDED RELEASE ORAL DAILY
Status: DISCONTINUED | OUTPATIENT
Start: 2023-04-28 | End: 2023-05-05 | Stop reason: HOSPADM

## 2023-04-28 RX ORDER — FUROSEMIDE 10 MG/ML
40 INJECTION INTRAMUSCULAR; INTRAVENOUS ONCE
Status: COMPLETED | OUTPATIENT
Start: 2023-04-28 | End: 2023-04-28

## 2023-04-28 RX ORDER — TRAZODONE HYDROCHLORIDE 100 MG/1
300 TABLET ORAL NIGHTLY
Status: DISCONTINUED | OUTPATIENT
Start: 2023-04-28 | End: 2023-05-05 | Stop reason: HOSPADM

## 2023-04-28 RX ORDER — ACETAMINOPHEN 500 MG
1000 TABLET ORAL
Status: COMPLETED | OUTPATIENT
Start: 2023-04-28 | End: 2023-04-28

## 2023-04-28 RX ORDER — QUETIAPINE FUMARATE 100 MG/1
200 TABLET, FILM COATED ORAL NIGHTLY
Status: DISCONTINUED | OUTPATIENT
Start: 2023-04-28 | End: 2023-05-05 | Stop reason: HOSPADM

## 2023-04-28 RX ORDER — ONDANSETRON 4 MG/1
4 TABLET, ORALLY DISINTEGRATING ORAL
COMMUNITY

## 2023-04-28 RX ORDER — NOREPINEPHRINE BITARTRATE/D5W 4MG/250ML
PLASTIC BAG, INJECTION (ML) INTRAVENOUS
Status: COMPLETED
Start: 2023-04-28 | End: 2023-04-28

## 2023-04-28 RX ORDER — HYDROCODONE BITARTRATE AND ACETAMINOPHEN 10; 325 MG/1; MG/1
TABLET ORAL
COMMUNITY
End: 2023-04-28 | Stop reason: SDUPTHER

## 2023-04-28 RX ORDER — DEXTROSE 40 %
15 GEL (GRAM) ORAL
Status: DISCONTINUED | OUTPATIENT
Start: 2023-04-28 | End: 2023-05-05 | Stop reason: HOSPADM

## 2023-04-28 RX ORDER — TALC
6 POWDER (GRAM) TOPICAL NIGHTLY PRN
Status: DISCONTINUED | OUTPATIENT
Start: 2023-04-28 | End: 2023-05-05 | Stop reason: HOSPADM

## 2023-04-28 RX ORDER — ACETAMINOPHEN 500 MG
1000 TABLET ORAL EVERY 8 HOURS PRN
Status: DISCONTINUED | OUTPATIENT
Start: 2023-04-28 | End: 2023-05-05 | Stop reason: HOSPADM

## 2023-04-28 RX ORDER — ACETAMINOPHEN 325 MG/1
650 TABLET ORAL EVERY 4 HOURS PRN
Status: DISCONTINUED | OUTPATIENT
Start: 2023-04-28 | End: 2023-05-05 | Stop reason: HOSPADM

## 2023-04-28 RX ORDER — LIDOCAINE 50 MG/G
1 PATCH TOPICAL DAILY
Status: DISCONTINUED | OUTPATIENT
Start: 2023-04-28 | End: 2023-05-05 | Stop reason: HOSPADM

## 2023-04-28 RX ORDER — HYDROCORTISONE 10 MG/1
10 TABLET ORAL DAILY
Status: DISCONTINUED | OUTPATIENT
Start: 2023-04-28 | End: 2023-04-28

## 2023-04-28 RX ORDER — IPRATROPIUM BROMIDE AND ALBUTEROL SULFATE 2.5; .5 MG/3ML; MG/3ML
3 SOLUTION RESPIRATORY (INHALATION)
Status: COMPLETED | OUTPATIENT
Start: 2023-04-28 | End: 2023-04-28

## 2023-04-28 RX ORDER — FLUDROCORTISONE ACETATE 0.1 MG/1
100 TABLET ORAL DAILY
Status: DISCONTINUED | OUTPATIENT
Start: 2023-04-28 | End: 2023-05-03

## 2023-04-28 RX ORDER — ENOXAPARIN SODIUM 100 MG/ML
40 INJECTION SUBCUTANEOUS EVERY 24 HOURS
Status: DISCONTINUED | OUTPATIENT
Start: 2023-04-28 | End: 2023-05-05 | Stop reason: HOSPADM

## 2023-04-28 RX ORDER — FLUOXETINE HYDROCHLORIDE 20 MG/1
60 CAPSULE ORAL DAILY
Status: DISCONTINUED | OUTPATIENT
Start: 2023-04-28 | End: 2023-05-05 | Stop reason: HOSPADM

## 2023-04-28 RX ORDER — ACETAMINOPHEN, DIPHENHYDRAMINE HCL, PHENYLEPHRINE HCL 325; 25; 5 MG/1; MG/1; MG/1
1 TABLET ORAL DAILY
COMMUNITY

## 2023-04-28 RX ORDER — ASPIRIN 81 MG/1
81 TABLET ORAL DAILY
Status: DISCONTINUED | OUTPATIENT
Start: 2023-04-28 | End: 2023-05-05 | Stop reason: HOSPADM

## 2023-04-28 RX ORDER — KETOROLAC TROMETHAMINE 10 MG/1
10 TABLET, FILM COATED ORAL DAILY PRN
Status: ON HOLD | COMMUNITY
End: 2024-02-19 | Stop reason: HOSPADM

## 2023-04-28 RX ORDER — LIOTHYRONINE SODIUM 25 UG/1
25 TABLET ORAL DAILY
Status: DISCONTINUED | OUTPATIENT
Start: 2023-04-28 | End: 2023-05-05 | Stop reason: HOSPADM

## 2023-04-28 RX ORDER — POLYETHYLENE GLYCOL 3350 17 G/17G
17 POWDER, FOR SOLUTION ORAL DAILY
Status: DISCONTINUED | OUTPATIENT
Start: 2023-04-28 | End: 2023-05-05 | Stop reason: HOSPADM

## 2023-04-28 RX ORDER — MUPIROCIN 20 MG/G
OINTMENT TOPICAL 2 TIMES DAILY
Status: COMPLETED | OUTPATIENT
Start: 2023-04-28 | End: 2023-05-03

## 2023-04-28 RX ORDER — NALOXONE HCL 0.4 MG/ML
0.02 VIAL (ML) INJECTION
Status: DISCONTINUED | OUTPATIENT
Start: 2023-04-28 | End: 2023-05-05 | Stop reason: HOSPADM

## 2023-04-28 RX ORDER — LANOLIN ALCOHOL/MO/W.PET/CERES
1000 CREAM (GRAM) TOPICAL DAILY
Status: DISCONTINUED | OUTPATIENT
Start: 2023-04-28 | End: 2023-05-05 | Stop reason: HOSPADM

## 2023-04-28 RX ORDER — ONDANSETRON 8 MG/1
8 TABLET, ORALLY DISINTEGRATING ORAL EVERY 8 HOURS PRN
Status: DISCONTINUED | OUTPATIENT
Start: 2023-04-28 | End: 2023-05-05 | Stop reason: HOSPADM

## 2023-04-28 RX ORDER — DOCUSATE SODIUM 100 MG/1
200 CAPSULE, LIQUID FILLED ORAL NIGHTLY
COMMUNITY

## 2023-04-28 RX ORDER — ONDANSETRON 2 MG/ML
4 INJECTION INTRAMUSCULAR; INTRAVENOUS EVERY 8 HOURS PRN
Status: DISCONTINUED | OUTPATIENT
Start: 2023-04-28 | End: 2023-05-05 | Stop reason: HOSPADM

## 2023-04-28 RX ORDER — DEXTROSE 40 %
30 GEL (GRAM) ORAL
Status: DISCONTINUED | OUTPATIENT
Start: 2023-04-28 | End: 2023-05-05 | Stop reason: HOSPADM

## 2023-04-28 RX ADMIN — ACETAMINOPHEN 1000 MG: 500 TABLET ORAL at 08:04

## 2023-04-28 RX ADMIN — MEROPENEM 1 G: 1 INJECTION, POWDER, FOR SOLUTION INTRAVENOUS at 06:04

## 2023-04-28 RX ADMIN — ENOXAPARIN SODIUM 40 MG: 40 INJECTION SUBCUTANEOUS at 04:04

## 2023-04-28 RX ADMIN — TIOTROPIUM BROMIDE INHALATION SPRAY 2 PUFF: 3.12 SPRAY, METERED RESPIRATORY (INHALATION) at 04:04

## 2023-04-28 RX ADMIN — STANDARDIZED SENNA CONCENTRATE 2 TABLET: 8.6 TABLET ORAL at 12:04

## 2023-04-28 RX ADMIN — CYANOCOBALAMIN TAB 1000 MCG 1000 MCG: 1000 TAB at 12:04

## 2023-04-28 RX ADMIN — PANTOPRAZOLE SODIUM 40 MG: 40 TABLET, DELAYED RELEASE ORAL at 12:04

## 2023-04-28 RX ADMIN — POTASSIUM CHLORIDE 20 MEQ: 1500 TABLET, EXTENDED RELEASE ORAL at 12:04

## 2023-04-28 RX ADMIN — IPRATROPIUM BROMIDE AND ALBUTEROL SULFATE 3 ML: .5; 2.5 SOLUTION RESPIRATORY (INHALATION) at 10:04

## 2023-04-28 RX ADMIN — PIPERACILLIN AND TAZOBACTAM 4.5 G: 4; .5 INJECTION, POWDER, LYOPHILIZED, FOR SOLUTION INTRAVENOUS; PARENTERAL at 08:04

## 2023-04-28 RX ADMIN — QUETIAPINE FUMARATE 200 MG: 100 TABLET ORAL at 09:04

## 2023-04-28 RX ADMIN — HYDROCODONE BITARTRATE AND ACETAMINOPHEN 1 TABLET: 10; 325 TABLET ORAL at 02:04

## 2023-04-28 RX ADMIN — HYDROCORTISONE SODIUM SUCCINATE 100 MG: 250 INJECTION, POWDER, FOR SOLUTION INTRAMUSCULAR; INTRAVENOUS at 02:04

## 2023-04-28 RX ADMIN — Medication 0.05 MCG/KG/MIN: at 12:04

## 2023-04-28 RX ADMIN — LIDOCAINE 1 PATCH: 50 PATCH CUTANEOUS at 01:04

## 2023-04-28 RX ADMIN — TRAZODONE HYDROCHLORIDE 300 MG: 100 TABLET ORAL at 09:04

## 2023-04-28 RX ADMIN — FLUDROCORTISONE ACETATE 100 MCG: 0.1 TABLET ORAL at 02:04

## 2023-04-28 RX ADMIN — ASPIRIN 81 MG: 81 TABLET, COATED ORAL at 12:04

## 2023-04-28 RX ADMIN — LIOTHYRONINE SODIUM 25 MCG: 25 TABLET ORAL at 02:04

## 2023-04-28 RX ADMIN — OXYBUTYNIN CHLORIDE 10 MG: 5 TABLET, EXTENDED RELEASE ORAL at 12:04

## 2023-04-28 RX ADMIN — STANDARDIZED SENNA CONCENTRATE 2 TABLET: 8.6 TABLET ORAL at 09:04

## 2023-04-28 RX ADMIN — FLUOXETINE HYDROCHLORIDE 60 MG: 20 CAPSULE ORAL at 12:04

## 2023-04-28 RX ADMIN — FUROSEMIDE 40 MG: 10 INJECTION, SOLUTION INTRAMUSCULAR; INTRAVENOUS at 01:04

## 2023-04-28 RX ADMIN — HYDROCORTISONE SODIUM SUCCINATE 100 MG: 250 INJECTION, POWDER, FOR SOLUTION INTRAMUSCULAR; INTRAVENOUS at 09:04

## 2023-04-28 RX ADMIN — FLUTICASONE PROPIONATE 100 MCG: 50 SPRAY, METERED NASAL at 02:04

## 2023-04-28 RX ADMIN — Medication 10 ML: at 09:04

## 2023-04-28 RX ADMIN — DOXYCYCLINE 100 MG: 100 INJECTION, POWDER, LYOPHILIZED, FOR SOLUTION INTRAVENOUS at 04:04

## 2023-04-28 RX ADMIN — SODIUM CHLORIDE, POTASSIUM CHLORIDE, SODIUM LACTATE AND CALCIUM CHLORIDE 3198 ML: 600; 310; 30; 20 INJECTION, SOLUTION INTRAVENOUS at 09:04

## 2023-04-28 RX ADMIN — MEROPENEM 1 G: 1 INJECTION, POWDER, FOR SOLUTION INTRAVENOUS at 12:04

## 2023-04-28 RX ADMIN — MUPIROCIN: 20 OINTMENT TOPICAL at 09:04

## 2023-04-28 NOTE — ASSESSMENT & PLAN NOTE
-presents with septic shock presumably due to multifocal pneumonia; also likely component of adrenal insufficiency  -mild encephalopathy, lethargy  -fluid resuscitated in the ED without appropriate response   -initiate on Levophed   -blood cultures collected in the ED   -initial lactic acid 2.6; has improved upon reassessment  -initiate on broad-spectrum antibiotics; she is grown ESBL and MSSA in the past  -transfer to ICU

## 2023-04-28 NOTE — PHARMACY MED REC
"  Admission Medication History     The home medication history was taken by Radha Sevilla CPhT.    Medication history obtained from, Patient's Daughter Verified    You may go to "Admission" then "Reconcile Home Medications" tabs to review and/or act upon these items.     The home medication list has been updated by the Pharmacy department.   Please read ALL comments highlighted in yellow.   Please address this information as you see fit.    Feel free to contact us if you have any questions or require assistance.      The medications listed below were removed from the home medication list.  Please reorder if appropriate:  Patient reports no longer taking the following medication(s):  Dicloxacillin 500 mg        Radha Sevilla CPhT.  Ext [phone]             .        "

## 2023-04-28 NOTE — ED NOTES
Motley bag to patients suprapubic catheter changed and clamped off so that urine sample could be obtained.

## 2023-04-28 NOTE — PLAN OF CARE
Pt remains on levo gtt as ordered. Vss, nadn. Remains lethargic but when awake, ox4. Follows comm's. Pleasant. Pain controlled with PO pain med ordered. Goal is to wean off levo gtt as janeen'ed and poss. Transfer out of icu tomorrow when stable. See flow sheet for kristine info. Afebrile.

## 2023-04-28 NOTE — HPI
Ms. Halwey is a 68yo woman with adrenal insufficiency, chronic pain on dilaudid intrathecal pump, sleep apnea, hypothyroidism, lymphedema, suprapubic catheter who presents with several days of shortness of breath.  States that over the past few days she has been having worsening shortness of breath along with productive cough.  Denies any fevers or chills, although she does have a recorded fever upon admission here.  She does use supplemental oxygen at home and usually uses CPAP device.  She also states that she has had some chest pain that started yesterday.  Worse with deep inspiration.  She notes bilateral lower extremity swelling as well.

## 2023-04-28 NOTE — ASSESSMENT & PLAN NOTE
Patient has recurrent depression which is mild and is currently controlled. Will Continue anti-depressant medications. We will not consult psychiatry at this time. Patient does not display psychosis at this time. Continue to monitor closely and adjust plan of care as needed.       no enlarged lymph nodes/no tender lymph nodes/no swelling of extremity

## 2023-04-28 NOTE — ED NOTES
Patient CHF patient. Confirmed with Dr. Castro to discontinue fluids at this time. Pt received appx 700 ml

## 2023-04-28 NOTE — SUBJECTIVE & OBJECTIVE
Past Medical History:   Diagnosis Date    Anticoagulant long-term use     Anxiety     Arthritis     Bilateral lower extremity edema     severe chronic    Carotid artery occlusion     Cataract     CHF (congestive heart failure)     Coronary artery disease     subtotalled LAD with collateral    Depression     Fever blister     Hard of hearing     Hypokalemia 1/9/2023    Hyponatremia 2/4/2022    Hypothyroid     Iron deficiency anemia     Lumbar radiculopathy     with chronic pain    Ocular migraine     Renal disorder     Sleep apnea     cpap       Past Surgical History:   Procedure Laterality Date    ADENOIDECTOMY      BACK SURGERY      x 12    CARDIAC CATHETERIZATION  2016    subtotalled LAD with right to left collaterals    CATARACT EXTRACTION W/  INTRAOCULAR LENS IMPLANT Left     Dr Coleman     CYSTOSCOPIC LITHOLAPAXY N/A 6/27/2019    Procedure: CYSTOLITHOLAPAXY;  Surgeon: Shireen Mayo MD;  Location: [facility] OR 2ND FLR;  Service: Urology;  Laterality: N/A;    CYSTOSCOPIC LITHOLAPAXY N/A 9/3/2019    Procedure: CYSTOLITHOLAPAXY;  Surgeon: Shireen Mayo MD;  Location: [facility] OR 2ND FLR;  Service: Urology;  Laterality: N/A;    CYSTOSCOPY N/A 7/13/2021    Procedure: CYSTOSCOPY;  Surgeon: Shireen Mayo MD;  Location: [facility] OR 1ST FLR;  Service: Urology;  Laterality: N/A;    CYSTOSCOPY  11/16/2021    Procedure: CYSTOSCOPY;  Surgeon: Shireen Mayo MD;  Location: [facility] OR 1ST FLR;  Service: Urology;;    CYSTOSCOPY  7/19/2022    Procedure: CYSTOSCOPY;  Surgeon: Shireen Mayo MD;  Location: [facility] OR 1ST FLR;  Service: Urology;;    CYSTOSCOPY WITH INJECTION OF PERIURETHRAL BULKING AGENT  7/19/2022    Procedure: CYSTOSCOPY, WITH PERIURETHRAL BULKING AGENT INJECTION-MACROPLASTIQUE;  Surgeon: Shireen Mayo MD;  Location: [facility] OR 1ST FLR;  Service: Urology;;    CYSTOSCOPY WITH INJECTION OF PERIURETHRAL BULKING AGENT N/A 3/28/2023    Procedure: CYSTOSCOPY, WITH PERIURETHRAL BULKING AGENT INJECTION;  Surgeon: Shireen  NIEVES Mayo MD;  Location: [address];  Service: Urology;  Laterality: N/A;  [place]    CYSTOSCOPY,WITH BOTULINUM TOXIN INJECTION N/A 12/13/2022    Procedure: CYSTOSCOPY,WITH BOTULINUM TOXIN INJECTION;  Surgeon: Shireen Mayo MD;  [facility];  Service: Urology;  Laterality: N/A;  300 U    CYSTOSCOPY,WITH BOTULINUM TOXIN INJECTION N/A 3/28/2023    Procedure: CYSTOSCOPY,WITH BOTULINUM TOXIN INJECTION;  Surgeon: Shireen Mayo MD;  Location: [facility] OR [address] [place] [address];  Service: Urology;  Laterality: N/A;  45 MIN.    300 UNITS    ESOPHAGOGASTRODUODENOSCOPY N/A 5/23/2018    Procedure: ESOPHAGOGASTRODUODENOSCOPY (EGD);  Surgeon: Prince Vance MD;  [facility]  Service: Endoscopy;  Laterality: N/A;  r/s 'd per Dr. Vance due to family emergency- ER    HYSTERECTOMY  1975    endometriosis    INJECTION OF BOTULINUM TOXIN TYPE A  7/13/2021    Procedure: INJECTION, BOTULINUM TOXIN, 200units;  Surgeon: Shireen Mayo MD;  Location: [facility] OR [address] [place] [address];  Service: Urology;;    INJECTION OF BOTULINUM TOXIN TYPE A  11/16/2021    Procedure: INJECTION, BOTULINUM TOXIN, 200units;  Surgeon: Shireen Mayo MD;  Location: [facility] OR [address] [place] [address];  Service: Urology;;    INJECTION OF BOTULINUM TOXIN TYPE A  7/19/2022    Procedure: INJECTION, BOTULINUM TOXIN, 300 units ;  Surgeon: Shireen Mayo MD;  Location: [address];  Service: Urology;;    INSERTION, SUPRAPUBIC CATHETER N/A 12/13/2022    Procedure: INSERTION, SUPRAPUBIC CATHETER;  Surgeon: Shireen Mayo MD;  Location: [facility] OR [address] [place] [address];  Service: Urology;  Laterality: N/A;  exchange    pain pump placement      SQ Dilaudid Pump managed by Dr. Hillman, Pain Management    REMOVAL OF BONE SPUR OF FOOT Bilateral 9/16/2022    Procedure: EXCISION ARTHRITIC BONE, BILATERAL FOOT;  Surgeon: Adam Mcguire DPM;  Location: [facility] OR;  Service: Podiatry;  Laterality: Bilateral;    REPLACEMENT OF CATHETER N/A 10/31/2019    Procedure: REPLACEMENT,  CATHETER-SUPRAPUBIC;  Surgeon: Shireen Mayo MD;  Location: [facility] OR [address] [place] [address];  Service: Urology;  Laterality: N/A;    SPINAL CORD STIMULATOR REMOVAL      before [place]    SPINE SURGERY  5-13-13    CERVICAL FUSION    TONSILLECTOMY         Review of patient's allergies indicates:   Allergen Reactions    (d)-limonene flavor      Other reaction(s): difficult intubation  Other reaction(s): Difficulty breathing    Bactrim [sulfamethoxazole-trimethoprim] Anaphylaxis    Benadryl [diphenhydramine hcl] Shortness Of Breath    Fentanyl Itching, Nausea And Vomiting and Swelling             Imitrex [sumatriptan succinate] Shortness Of Breath    Percocet [oxycodone-acetaminophen] Shortness Of Breath    Topamax [topiramate] Shortness Of Breath    Vancomycin Anaphylaxis     Rash    Butorphanol tartrate     Darvocet a500 [propoxyphene n-acetaminophen]      Other reaction(s): Difficulty breathing    Evening primrose (oenothera biennis)     Lyrica [pregabalin] Other (See Comments)     tremors    White petrolatum-zinc     Zinc oxide-white petrolatum      Other reaction(s): Difficulty breathing    Latex, natural rubber Itching and Rash    Phenytoin Rash and Other (See Comments)     Trouble breathing       No current facility-administered medications on file prior to encounter.     Current Outpatient Medications on File Prior to Encounter   Medication Sig    aspirin (ECOTRIN) 81 MG EC tablet Take 1 tablet (81 mg total) by mouth once daily.    atorvastatin (LIPITOR) 80 MG tablet TAKE ONE TABLET BY MOUTH EVERY DAY (Patient taking differently: Take 80 mg by mouth once daily.)    butalbital-acetaminophen-caffeine -40 mg (FIORICET, ESGIC) -40 mg per tablet Take 1 tablet by mouth daily as needed.    cyanocobalamin, vitamin B-12, (VITAMIN B-12) 5,000 mcg Subl Place 1 tablet under the tongue once daily.    docusate sodium (COLACE) 100 MG capsule Take 200 mg by mouth every evening.    fludrocortisone (FLORINEF) 0.1 mg Tab Take 1  tablet (100 mcg total) by mouth once daily.    FLUoxetine 20 MG capsule Take 3 capsules (60 mg total) by mouth once daily.    fluticasone propionate (FLONASE) 50 mcg/actuation nasal spray 2 sprays (100 mcg total) by Each Nostril route once daily.    furosemide (LASIX) 40 MG tablet Take 1 tablet (40 mg total) by mouth once daily.    HYDROcodone-acetaminophen (NORCO)  mg per tablet Take 1 tablet by mouth every 6 (six) hours as needed for breakthrough pain.    hydrocortisone (CORTEF) 10 MG Tab Take 1 tablet (10 mg total) by mouth once daily.    intrathecal pain pump compound Hydromorphone (7.5 mg/mL) infusion at 8.59 mg/day (0.3578 mg/hr) out of a total reservoir volume of 40 mL  Pump filled monthly    ketorolac (TORADOL) 10 mg tablet Take 10 mg by mouth daily as needed.    levothyroxine (SYNTHROID) 150 MCG tablet Take 1 tablet (150 mcg total) by mouth before breakfast.    LIDOcaine (LIDODERM) 5 % Place 1 patch onto the skin once daily. Remove & Discard patch within 12 hours or as directed by MD    liothyronine (CYTOMEL) 25 MCG Tab Take 1 tablet (25 mcg total) by mouth once daily.    mirabegron (MYRBETRIQ) 50 mg Tb24 Take 1 tablet (50 mg total) by mouth once daily.    nitroGLYCERIN (NITROSTAT) 0.4 MG SL tablet Place 0.4 mg under the tongue every 5 (five) minutes as needed for Chest pain.    ondansetron (ZOFRAN-ODT) 4 MG TbDL Take 4 mg by mouth every 4 to 6 hours as needed.    pantoprazole (PROTONIX) 40 MG tablet Take 1 tablet (40 mg total) by mouth once daily.    potassium chloride SA (K-DUR,KLOR-CON) 20 MEQ tablet Take 1 tablet (20 mEq total) by mouth once daily.    QUEtiapine (SEROQUEL) 100 MG Tab TAKE 2 TABLETS (200 MG) BY MOUTH NIGHTLY (Patient taking differently: Take 200 mg by mouth nightly.)    senna (SENNA LAX) 8.6 mg tablet Take 2 tablets by mouth 2 (two) times daily.    tiotropium bromide (SPIRIVA RESPIMAT) 2.5 mcg/actuation inhaler Inhale 2 puffs into the lungs once daily. Controller    tiZANidine  (ZANAFLEX) 4 MG tablet Take 4 mg by mouth 2 (two) times daily as needed.    traZODone (DESYREL) 300 MG tablet Take 1 tablet (300 mg total) by mouth every evening.    promethazine (PHENERGAN) 25 MG tablet Take 25 mg by mouth every 6 (six) hours as needed for Nausea.    [DISCONTINUED] dicloxacillin (DYNAPEN) 500 MG capsule dicloxacillin 500 mg capsule   TAKE 1 CAPSULE BY MOUTH 4 TIMES A DAY FOR 8 DAYS    [DISCONTINUED] HYDROcodone-acetaminophen (NORCO)  mg per tablet hydrocodone 10 mg-acetaminophen 325 mg tablet    [DISCONTINUED] LIDOcaine (LIDODERM) 5 % lidocaine 5 % topical patch   Apply 1 patch every 12 hours by topical route for 30 days.     Family History       Problem Relation (Age of Onset)    Cancer Mother (55), Father    Cirrhosis Paternal Aunt, Paternal Uncle    Colon cancer Maternal Uncle    Esophageal cancer Father    Heart disease Maternal Uncle    Liver disease Paternal Aunt, Paternal Uncle    No Known Problems Brother, Brother, Sister, Maternal Aunt, Maternal Grandfather, Paternal Grandmother, Paternal Grandfather    Parkinsonism Maternal Grandmother    Tremor Maternal Grandmother          Tobacco Use    Smoking status: Never    Smokeless tobacco: Never   Substance and Sexual Activity    Alcohol use: Never    Drug use: No    Sexual activity: Never     Partners: Male     Review of Systems   Unable to perform ROS: Acuity of condition   Objective:     Vital Signs (Most Recent):  Temp: 99.3 °F (37.4 °C) (04/28/23 1400)  Pulse: 76 (04/28/23 1400)  Resp: 20 (04/28/23 1400)  BP: (!) 107/51 (04/28/23 1400)  SpO2: (!) 94 % (04/28/23 1400)   Vital Signs (24h Range):  Temp:  [98.9 °F (37.2 °C)-100.4 °F (38 °C)] 99.3 °F (37.4 °C)  Pulse:  [] 76  Resp:  [18-40] 20  SpO2:  [89 %-98 %] 94 %  BP: ()/(36-68) 107/51     Weight: 106.6 kg (235 lb)  Body mass index is 36.81 kg/m².    Physical Exam  Vitals reviewed.   Constitutional:       General: She is not in acute distress.     Appearance: She is  well-developed. She is ill-appearing. She is not diaphoretic.   HENT:      Head: Normocephalic and atraumatic.      Nose: Nose normal.   Eyes:      General: No scleral icterus.     Pupils: Pupils are equal, round, and reactive to light.   Neck:      Vascular: No JVD.      Trachea: No tracheal deviation.   Cardiovascular:      Rate and Rhythm: Normal rate and regular rhythm.      Heart sounds: Normal heart sounds.   Pulmonary:      Effort: Pulmonary effort is normal. No respiratory distress.      Breath sounds: Rales present.      Comments: On supplemental oxygen  Abdominal:      General: There is no distension.      Palpations: Abdomen is soft.      Tenderness: There is no abdominal tenderness.   Musculoskeletal:         General: No deformity.      Cervical back: Normal range of motion.      Right lower leg: Edema present.      Left lower leg: Edema present.   Skin:     General: Skin is warm and dry.      Findings: No rash.   Neurological:      Mental Status: She is oriented to person, place, and time. She is lethargic.   Psychiatric:         Behavior: Behavior normal.         CRANIAL NERVES     CN III, IV, VI   Pupils are equal, round, and reactive to light.     Significant Labs: All pertinent labs within the past 24 hours have been reviewed.    Significant Imaging: I have reviewed all pertinent imaging results/findings within the past 24 hours.

## 2023-04-28 NOTE — CONSULTS
Wound care consult received for BLE- Pt with unna boots to BLE- pt reports the compression wraps were placed yesterday. Recommend to leave wraps in place x1 week unless signs of strike through drainage, increased swelling, or poor perfusion.  Notified nurse who reports sacrum intact with no redness.

## 2023-04-28 NOTE — ED NOTES
Patient b/p upon transfer to the floor is 76/36.   Transport cancelled.  Admitting team paged.  New orders to be placed.  Level of care to be changed to ICU

## 2023-04-28 NOTE — ASSESSMENT & PLAN NOTE
-stress dose IV hydrocortisone     Pt completed PHQ-9.    PHQ-9 SCORE 8/24/2018   Total Score -   Total Score 0     Soraya Kellogg CMA ([facility])

## 2023-04-28 NOTE — ED TRIAGE NOTES
66 y/o female Patient comes into the emergency department by EMS with complaints of SOB 2-3X days, +productive cough, +hx of COPD,CHF. Patient  reports yellow/green productive cough. Pt endorses ha,blurry vision,double vision,tinnitus, discomfort Mid Lower abdomen.

## 2023-04-28 NOTE — ASSESSMENT & PLAN NOTE
-BNP within normal range; does have some leg swelling but this is likely secondary to her lymphedema

## 2023-04-28 NOTE — ASSESSMENT & PLAN NOTE
-chest x-ray concerning for multifocal pneumonia   -initiated on broad-spectrum antibiotics   -respiratory sputum cultures and blood cultures pending   -see septic shock

## 2023-04-28 NOTE — ED PROVIDER NOTES
"Encounter Date: 4/28/2023    SCRIBE #1 NOTE: I, Monse Hernandez, am scribing for, and in the presence of,  Sanket Castro MD. I have scribed the following portions of the note - Other sections scribed: HPI, ROS, Physical Exam, MDM.     History     Chief Complaint   Patient presents with    Shortness of Breath     C/o SOB with productive cough (yellow sputum) x 2 days. No fever reported. On arrival, awake, alert. Resp unlabored with treatment in progress.     Tasha Hawley is a 67 y.o. female who has a past medical history of Anticoagulant long-term use, Anxiety, Arthritis, Bilateral lower extremity edema, Carotid artery occlusion, Cataract, CHF (congestive heart failure), Coronary artery disease, Depression, Fever blister, Hard of hearing, Hypokalemia (1/9/2023), Hyponatremia (2/4/2022), Hypothyroid, Iron deficiency anemia, Lumbar radiculopathy, Ocular migraine, Renal disorder, and Sleep apnea.    Patients reports to the ED due to shortness of breath.     Patient reports shortness of breath with productive cough described as "yellow-greenish" sputum that started two days ago. Patient is currently on supplemental oxygen at home. She reports she did not use her CPAP machine last night. Patient also reports intermittent chain pain that started yesterday 4/27 that has progressively worsened. Patient endorses headache, blurry vision, tinnitus, and lower back pain. Patients lower back pain is reportedly chronic.                The history is provided by the patient.   Review of patient's allergies indicates:   Allergen Reactions    (d)-limonene flavor      Other reaction(s): difficult intubation  Other reaction(s): Difficulty breathing    Bactrim [sulfamethoxazole-trimethoprim] Anaphylaxis    Benadryl [diphenhydramine hcl] Shortness Of Breath    Fentanyl Itching, Nausea And Vomiting and Swelling             Imitrex [sumatriptan succinate] Shortness Of Breath    Percocet [oxycodone-acetaminophen] Shortness Of " Breath    Topamax [topiramate] Shortness Of Breath    Vancomycin Anaphylaxis     Rash    Butorphanol tartrate     Darvocet a500 [propoxyphene n-acetaminophen]      Other reaction(s): Difficulty breathing    Evening primrose (oenothera biennis)     Lyrica [pregabalin] Other (See Comments)     tremors    White petrolatum-zinc     Zinc oxide-white petrolatum      Other reaction(s): Difficulty breathing    Latex, natural rubber Itching and Rash    Phenytoin Rash and Other (See Comments)     Trouble breathing     Past Medical History:   Diagnosis Date    Anticoagulant long-term use     Anxiety     Arthritis     Bilateral lower extremity edema     severe chronic    Carotid artery occlusion     Cataract     CHF (congestive heart failure)     Coronary artery disease     subtotalled LAD with collateral    Depression     Fever blister     Hard of hearing     Hypokalemia 1/9/2023    Hyponatremia 2/4/2022    Hypothyroid     Iron deficiency anemia     Lumbar radiculopathy     with chronic pain    Ocular migraine     Renal disorder     Sleep apnea     cpap     Past Surgical History:   Procedure Laterality Date    ADENOIDECTOMY      BACK SURGERY      x 12    CARDIAC CATHETERIZATION  2016    subtotalled LAD with right to left collaterals    CATARACT EXTRACTION W/  INTRAOCULAR LENS IMPLANT Left     Dr Coleman     CYSTOSCOPIC LITHOLAPAXY N/A 6/27/2019    Procedure: CYSTOLITHOLAPAXY;  Surgeon: Shireen Mayo MD;  Location: [facility] OR [address] [place] [address];  Service: Urology;  Laterality: N/A;    CYSTOSCOPIC LITHOLAPAXY N/A 9/3/2019    Procedure: CYSTOLITHOLAPAXY;  Surgeon: Shireen Mayo MD;  Location: [facility] OR 2ND FLR;  Service: Urology;  Laterality: N/A;    CYSTOSCOPY N/A 7/13/2021    Procedure: CYSTOSCOPY;  Surgeon: Shireen Mayo MD;  [facility];  Service: Urology;  Laterality: N/A;    CYSTOSCOPY  11/16/2021    Procedure: CYSTOSCOPY;  Surgeon: Shireen Mayo MD;  Location: [facility] OR [address] [place] [address];  Service: Urology;;     CYSTOSCOPY  7/19/2022    Procedure: CYSTOSCOPY;  Surgeon: Shireen Mayo MD;  [facility];  Service: Urology;;    CYSTOSCOPY WITH INJECTION OF PERIURETHRAL BULKING AGENT  7/19/2022    Procedure: CYSTOSCOPY, WITH PERIURETHRAL BULKING AGENT INJECTION-MACROPLASTIQUE;  Surgeon: Shireen Mayo MD;  [facility];  Service: Urology;;    CYSTOSCOPY WITH INJECTION OF PERIURETHRAL BULKING AGENT N/A 3/28/2023    Procedure: CYSTOSCOPY, WITH PERIURETHRAL BULKING AGENT INJECTION;  Surgeon: Shireen Mayo MD;  [facility];  Service: Urology;  Laterality: N/A;  [place]    CYSTOSCOPY,WITH BOTULINUM TOXIN INJECTION N/A 12/13/2022    Procedure: CYSTOSCOPY,WITH BOTULINUM TOXIN INJECTION;  Surgeon: Shireen Mayo MD;  [facility];  Service: Urology;  Laterality: N/A;  300 U    CYSTOSCOPY,WITH BOTULINUM TOXIN INJECTION N/A 3/28/2023    Procedure: CYSTOSCOPY,WITH BOTULINUM TOXIN INJECTION;  Surgeon: Shireen Mayo MD;  [facility];  Service: Urology;  Laterality: N/A;  45 MIN.    300 UNITS    ESOPHAGOGASTRODUODENOSCOPY N/A 5/23/2018    Procedure: ESOPHAGOGASTRODUODENOSCOPY (EGD);  Surgeon: Prince Vance MD;  Location: [facility] (4TH FLR);  Service: Endoscopy;  Laterality: N/A;  r/s 'd per Dr. Vance due to family emergency- ER    HYSTERECTOMY  1975    endometriosis    INJECTION OF BOTULINUM TOXIN TYPE A  7/13/2021    Procedure: INJECTION, BOTULINUM TOXIN, 200units;  Surgeon: Shireen Mayo MD;  [facility];  Service: Urology;;    INJECTION OF BOTULINUM TOXIN TYPE A  11/16/2021    Procedure: INJECTION, BOTULINUM TOXIN, 200units;  Surgeon: Shireen Mayo MD;  [facility];  Service: Urology;;    INJECTION OF BOTULINUM TOXIN TYPE A  7/19/2022    Procedure: INJECTION, BOTULINUM TOXIN, 300 units ;  Surgeon: Shireen Mayo MD;  Location: [facility] OR [address] [place] [address];  Service: Urology;;    INSERTION, SUPRAPUBIC CATHETER N/A  12/13/2022    Procedure: INSERTION, SUPRAPUBIC CATHETER;  Surgeon: Shireen Mayo MD;  Location: [facility] OR 1ST FLR;  Service: Urology;  Laterality: N/A;  exchange    pain pump placement      SQ Dilaudid Pump managed by Dr. Hillman, Pain Management    REMOVAL OF BONE SPUR OF FOOT Bilateral 9/16/2022    Procedure: EXCISION ARTHRITIC BONE, BILATERAL FOOT;  Surgeon: Adam Mcgu[facility];  Location: [facility] OR;  Service: Podiatry;  Laterality: Bilateral;    REPLACEMENT OF CATHETER N/A 10/31/2019    Procedure: REPLACEMENT, CATHETER-SUPRAPUBIC;  Surgeon: Shireen Mayo MD;  [facility];  Service: Urology;  Laterality: N/A;    SPINAL CORD STIMULATOR REMOVAL      before [place]    SPINE SURGERY  5-13-13    CERVICAL FUSION    TONSILLECTOMY       Family History   Problem Relation Age of Onset    Cancer Mother 55        breast    Cancer Father         esophagus,had laryngectomy    Esophageal cancer Father     Parkinsonism Maternal Grandmother     Tremor Maternal Grandmother     No Known Problems Brother     No Known Problems Brother     Heart disease Maternal Uncle     Colon cancer Maternal Uncle         Less than 60    No Known Problems Sister     No Known Problems Maternal Aunt     Cirrhosis Paternal Aunt         ETOH    Liver disease Paternal Aunt         ETOH    Liver disease Paternal Uncle         ETOH    Cirrhosis Paternal Uncle         ETOH    No Known Problems Maternal Grandfather     No Known Problems Paternal Grandmother     No Known Problems Paternal Grandfather     Melanoma Neg Hx     Amblyopia Neg Hx     Blindness Neg Hx     Cataracts Neg Hx     Diabetes Neg Hx     Glaucoma Neg Hx     Hypertension Neg Hx     Macular degeneration Neg Hx     Retinal detachment Neg Hx     Strabismus Neg Hx     Stroke Neg Hx     Thyroid disease Neg Hx     Celiac disease Neg Hx     Colon polyps Neg Hx     Cystic fibrosis Neg Hx     Crohn's disease Neg Hx     Inflammatory bowel disease Neg Hx     Liver cancer Neg Hx      Rectal cancer Neg Hx     Stomach cancer Neg Hx     Ulcerative colitis Neg Hx     Lymphoma Neg Hx      Social History     Tobacco Use    Smoking status: Never    Smokeless tobacco: Never   Substance Use Topics    Alcohol use: Never    Drug use: No     Review of Systems   Constitutional:  Negative for fever.   HENT:  Positive for tinnitus.    Respiratory:  Positive for cough and shortness of breath.    Cardiovascular:  Positive for chest pain.   Musculoskeletal:  Positive for back pain.   Neurological:  Positive for headaches.     Physical Exam     Initial Vitals   BP Pulse Resp Temp SpO2   04/28/23 0744 04/28/23 0744 04/28/23 0757 04/28/23 0801 04/28/23 0744   123/68 67 (!) 40 (S) (!) 100.4 °F (38 °C) (!) 90 %      MAP       --                Physical Exam    Nursing note and vitals reviewed.  Constitutional: She appears well-developed and well-nourished.   Eyes: EOM are normal. Pupils are equal, round, and reactive to light.   Slightly pale conjunctivae    Neck: Neck supple. No thyromegaly present. No JVD present.   Normal range of motion.  Cardiovascular:    Tachycardia present.         Murmur heard.  Systolic murmur is present.  Pulmonary/Chest:   Course diminished breath sounds bilaterally    Abdominal: Abdomen is soft. Bowel sounds are normal.   Musculoskeletal:         General: No tenderness or edema. Normal range of motion.      Cervical back: Normal range of motion and neck supple.      Comments: Compression bandages to both lower extremities      Neurological: She is alert and oriented to person, place, and time. She has normal strength.   Skin: Skin is warm and dry.       ED Course   Critical Care    Date/Time: 4/28/2023 10:40 AM  Performed by: Sanket Castro MD  Authorized by: Sanket Castro MD   Direct patient critical care time: 60 minutes  Additional history critical care time: 5 minutes  Ordering / reviewing critical care time: 15 minutes  Documentation critical care time: 15  minutes  Consulting other physicians critical care time: 5 minutes  Total critical care time (exclusive of procedural time) : 100 minutes  Critical care was time spent personally by me on the following activities: examination of patient, obtaining history from patient or surrogate, ordering and performing treatments and interventions, ordering and review of laboratory studies, ordering and review of radiographic studies, pulse oximetry, re-evaluation of patient's condition, review of old charts and discussions with primary provider.      Labs Reviewed   CBC W/ AUTO DIFFERENTIAL - Abnormal; Notable for the following components:       Result Value    RBC 3.62 (*)     Hemoglobin 9.7 (*)     Hematocrit 31.8 (*)     MCH 26.8 (*)     MCHC 30.5 (*)     Gran # (ANC) 9.6 (*)     Immature Grans (Abs) 0.05 (*)     Lymph # 0.6 (*)     Gran % 89.4 (*)     Lymph % 5.2 (*)     All other components within normal limits    Narrative:     Collection has been rescheduled by [email or web address] at 04/28/2023 08:51 Reason:   NURSE DRAW   COMPREHENSIVE METABOLIC PANEL - Abnormal; Notable for the following components:    Potassium 3.2 (*)     CO2 34 (*)     Glucose 112 (*)     BUN 7 (*)     Albumin 3.1 (*)     ALT 6 (*)     All other components within normal limits    Narrative:     Collection has been rescheduled by AMK2 at 04/28/2023 08:51 Reason:   NURSE DRAW   LACTIC ACID, PLASMA - Abnormal; Notable for the following components:    Lactate (Lactic Acid) 2.6 (*)     All other components within normal limits   CULTURE, BLOOD    Narrative:     Collection has been rescheduled by [email or web address] at 04/28/2023 08:51 Reason:   NURSE DRAW  Collection has been rescheduled by [email or web address] at 04/28/2023 08:51 Reason:   NURSE DRAW   CULTURE, BLOOD    Narrative:     Collection has been rescheduled by [email or web address] at 04/28/2023 08:51 Reason:   NURSE DRAW  Collection has been rescheduled by [email or web address] at 04/28/2023 08:51 Reason:   NURSE DRAW   B-TYPE NATRIURETIC PEPTIDE    Narrative:      Collection has been rescheduled by [email or web address] at 04/28/2023 08:51 Reason:   NURSE DRAW   TROPONIN I    Narrative:     Collection has been rescheduled by [email or web address] at 04/28/2023 08:51 Reason:   NURSE DRAW   PROCALCITONIN   URINALYSIS, REFLEX TO URINE CULTURE   LACTIC ACID, PLASMA   SARS-COV-2 RDRP GENE   POCT INFLUENZA A/B MOLECULAR          Imaging Results              X-Ray Chest AP Portable (Final result)  Result time 04/28/23 09:19:26      Final result by Antonio Anthony DO (04/28/23 09:19:26)                   Impression:      Multifocal pneumonia.      Electronically signed by: Antonio Anthony  Date:    04/28/2023  Time:    09:19               Narrative:    EXAMINATION:  XR CHEST AP PORTABLE    CLINICAL HISTORY:  Sepsis;    TECHNIQUE:  Single frontal view of the chest was performed.    COMPARISON:  04/17/2023.    FINDINGS:  The lungs are hypoexpanded.  There are patchy airspace opacities and dense consolidations in the bilateral lungs compatible multifocal pneumonia.  There is a probable small left pleural effusion.  No pneumothorax.  The cardiac silhouette is obscured.  The visualized osseous structures demonstrate degenerative changes.  Cervical spine hardware noted.  Thoracic spine stimulator leads noted.                                       Medications   vancomycin - pharmacy to dose (has no administration in time range)   meropenem (MERREM) 1 g in sodium chloride 0.9 % 100 mL IVPB (MB+) (has no administration in time range)   lactated ringers bolus 3,198 mL (3,198 mLs Intravenous New Bag 4/28/23 0906)   piperacillin-tazobactam (ZOSYN) 4.5 g in dextrose 5 % in water (D5W) 5 % 100 mL IVPB (MB+) (0 g Intravenous Stopped 4/28/23 0926)   acetaminophen tablet 1,000 mg (1,000 mg Oral Given 4/28/23 0855)   albuterol-ipratropium 2.5 mg-0.5 mg/3 mL nebulizer solution 3 mL (3 mLs Nebulization Given 4/28/23 1028)     Medical Decision Making:   History:   Old Medical Records: I decided to obtain old medical records.  Initial  "Assessment:   Patient is a 67 year old female who has a past medical history of Anticoagulant long-term use, Anxiety, Arthritis, Bilateral lower extremity edema, Carotid artery occlusion, Cataract, CHF (congestive heart failure), Coronary artery disease, Depression, Fever blister, Hard of hearing, Hypokalemia (1/9/2023), Hyponatremia (2/4/2022), Hypothyroid, Iron deficiency anemia, Lumbar radiculopathy, Ocular migraine, Renal disorder, and Sleep apnea.    Patients reports to the ED due to shortness of breath.   Differential Diagnosis:   Differential Diagnosis includes, but is not limited to:  PE, MI/ACS, pneumothorax, pericardial effusion/tamonade, pneumonia, lung abscess, pericarditis/myocarditis, pleural effusion, lung mass, CHF exacerbation, asthma exacerbation, COPD exacerbation, aspirated/ingested foreign body, airway obstruction, CO poisoning, anemia, metabolic derangement, allergy/atopy, influenza, viral URI, viral syndrome.    Independently Interpreted Test(s):   I have ordered and independently interpreted X-rays - see summary below.       <> Summary of X-Ray Reading(s): Chest x-ray shows multifocal infiltrates.  Clinical Tests:   Lab Tests: Ordered and Reviewed       <> Summary of Lab: CBC shows a hemoglobin of 9.7 and hematocrit of 31.8.  CMP with a potassium of 3.2.  Lactic acid is 2.6.    Sepsis Perfusion Assessment: "I attest a sepsis perfusion exam was performed within 6 hours of sepsis, severe sepsis, or septic shock presentation, following fluid resuscitation."    Sepsis Perfusion Assessment Complete: 4/28/2023 10:40 AM    ED Management:  Sepsis orders were initiated upon patient arrival.  Chest x-ray shows evidence of multifocal pneumonia the patient was given IV antibiotics.  First lactic acid is mildly elevated at 2.6.  Case has been discussed with the [facility] hospitalist, Dr. Mistry, who will admit for further evaluation and treatment.  Other:   I have discussed this case with another health " care provider.        Scribe Attestation:   Scribe #1: I performed the above scribed service and the documentation accurately describes the services I performed. I attest to the accuracy of the note.                   Clinical Impression:   Final diagnoses:  [R06.02] Shortness of breath  [J18.9] Pneumonia of both lungs due to infectious organism, unspecified part of lung (Primary)   I, Dr. Sanket Castro, personally performed the services described in this documentation. All medical record entries made by the scribe were at my direction and in my presence. I have reviewed the chart and agree that the record reflects my personal performance and is accurate and complete. Sanket Castro MD.  10:40 AM 04/28/2023       ED Disposition Condition    Admit Stable                Sanket Castro MD  04/28/23 1040

## 2023-04-28 NOTE — ASSESSMENT & PLAN NOTE
Patient with Hypercapnic and Hypoxic Respiratory failure which is Acute on chronic.  she is on home oxygen at 2 LPM. Supplemental oxygen was provided and noted-      .   Signs/symptoms of respiratory failure include- respiratory distress and lethargy. Contributing diagnoses includes - Pneumonia Labs and images were reviewed. Patient Has recent ABG, which has been reviewed. Will treat underlying causes and adjust management of respiratory failure as follows-     -treat pneumonia  -continue home inhaler regimen  -incentive spirometry  -nightly CPAP; did not use last night

## 2023-04-28 NOTE — ED NOTES
Cath un clammed urine sample collected. Pt open eyes to voice at this time and able to hold conversation.

## 2023-04-28 NOTE — H&P
[facility] Medicine  History & Physical    Patient Name: Tasha Hawley  MRN: [id number]  Patient Class: IP- Inpatient  Admission Date: 4/28/2023  Attending Physician: Nic Mistry MD  Primary Care Provider: Mesfin Hodges Ii, MD         Patient information was obtained from patient, past medical records and ER records.     Subjective:     Principal Problem:Sepsis with encephalopathy and septic shock    Chief Complaint:   Chief Complaint   Patient presents with    Shortness of Breath     C/o SOB with productive cough (yellow sputum) x 2 days. No fever reported. On arrival, awake, alert. Resp unlabored with treatment in progress.        HPI: Ms. Hawley is a 66yo woman with adrenal insufficiency, chronic pain on dilaudid intrathecal pump, sleep apnea, hypothyroidism, lymphedema, suprapubic catheter who presents with several days of shortness of breath.  States that over the past few days she has been having worsening shortness of breath along with productive cough.  Denies any fevers or chills, although she does have a recorded fever upon admission here.  She does use supplemental oxygen at home and usually uses CPAP device.  She also states that she has had some chest pain that started yesterday.  Worse with deep inspiration.  She notes bilateral lower extremity swelling as well.      Past Medical History:   Diagnosis Date    Anticoagulant long-term use     Anxiety     Arthritis     Bilateral lower extremity edema     severe chronic    Carotid artery occlusion     Cataract     CHF (congestive heart failure)     Coronary artery disease     subtotalled LAD with collateral    Depression     Fever blister     Hard of hearing     Hypokalemia 1/9/2023    Hyponatremia 2/4/2022    Hypothyroid     Iron deficiency anemia     Lumbar radiculopathy     with chronic pain    Ocular migraine     Renal disorder     Sleep apnea     cpap       Past Surgical History:   Procedure  Laterality Date    ADENOIDECTOMY      BACK SURGERY      x 12    CARDIAC CATHETERIZATION  2016    subtotalled LAD with right to left collaterals    CATARACT EXTRACTION W/  INTRAOCULAR LENS IMPLANT Left     Dr Coleman     CYSTOSCOPIC LITHOLAPAXY N/A 6/27/2019    Procedure: CYSTOLITHOLAPAXY;  Surgeon: Shireen Mayo MD;  Location: [facility] OR 2ND FLR;  Service: Urology;  Laterality: N/A;    CYSTOSCOPIC LITHOLAPAXY N/A 9/3/2019    Procedure: CYSTOLITHOLAPAXY;  Surgeon: Shireen Mayo MD;  Location: [facility] OR 2ND FLR;  Service: Urology;  Laterality: N/A;    CYSTOSCOPY N/A 7/13/2021    Procedure: CYSTOSCOPY;  Surgeon: Shireen Mayo MD;  Location: [facility] OR 1ST FLR;  Service: Urology;  Laterality: N/A;    CYSTOSCOPY  11/16/2021    Procedure: CYSTOSCOPY;  Surgeon: Shireen Mayo MD;  [facility];  Service: Urology;;    CYSTOSCOPY  7/19/2022    Procedure: CYSTOSCOPY;  Surgeon: Shireen Mayo MD;  [facility];  Service: Urology;;    CYSTOSCOPY WITH INJECTION OF PERIURETHRAL BULKING AGENT  7/19/2022    Procedure: CYSTOSCOPY, WITH PERIURETHRAL BULKING AGENT INJECTION-MACROPLASTIQUE;  Surgeon: Shireen Mayo MD;  [facility];  Service: Urology;;    CYSTOSCOPY WITH INJECTION OF PERIURETHRAL BULKING AGENT N/A 3/28/2023    Procedure: CYSTOSCOPY, WITH PERIURETHRAL BULKING AGENT INJECTION;  Surgeon: Shireen Mayo MD;  Location: [facility] OR 1ST FLR;  Service: Urology;  Laterality: N/A;  [place]    CYSTOSCOPY,WITH BOTULINUM TOXIN INJECTION N/A 12/13/2022    Procedure: CYSTOSCOPY,WITH BOTULINUM TOXIN INJECTION;  Surgeon: Shireen Mayo MD;  [facility];  Service: Urology;  Laterality: N/A;  300 U    CYSTOSCOPY,WITH BOTULINUM TOXIN INJECTION N/A 3/28/2023    Procedure: CYSTOSCOPY,WITH BOTULINUM TOXIN INJECTION;  Surgeon: Shireen Mayo MD;  [facility];  Service: Urology;  Laterality: N/A;  45 MIN.    300 UNITS     ESOPHAGOGASTRODUODENOSCOPY N/A 5/23/2018    Procedure: ESOPHAGOGASTRODUODENOSCOPY (EGD);  Surgeon: Prince Vance MD;  Location: [facility] (4TH FLR);  Service: Endoscopy;  Laterality: N/A;  r/s 'd per Dr. Vance due to family emergency- ER    HYSTERECTOMY  1975    endometriosis    INJECTION OF BOTULINUM TOXIN TYPE A  7/13/2021    Procedure: INJECTION, BOTULINUM TOXIN, 200units;  Surgeon: Shireen Mayo MD;  [facility];  Service: Urology;;    INJECTION OF BOTULINUM TOXIN TYPE A  11/16/2021    Procedure: INJECTION, BOTULINUM TOXIN, 200units;  Surgeon: Shireen Mayo MD;  [facility];  Service: Urology;;    INJECTION OF BOTULINUM TOXIN TYPE A  7/19/2022    Procedure: INJECTION, BOTULINUM TOXIN, 300 units ;  Surgeon: Shireen Mayo MD;  [facility];  Service: Urology;;    INSERTION, SUPRAPUBIC CATHETER N/A 12/13/2022    Procedure: INSERTION, SUPRAPUBIC CATHETER;  Surgeon: Shireen Mayo MD;  [facility];  Service: Urology;  Laterality: N/A;  exchange    pain pump placement      SQ Dilaudid Pump managed by Dr. Hillman, Pain Management    REMOVAL OF BONE SPUR OF FOOT Bilateral 9/16/2022    Procedure: EXCISION ARTHRITIC BONE, BILATERAL FOOT;  Surgeon: NIOCLA Mcgrath;  [facility];  Service: Podiatry;  Laterality: Bilateral;    REPLACEMENT OF CATHETER N/A 10/31/2019    Procedure: REPLACEMENT, CATHETER-SUPRAPUBIC;  Surgeon: Shireen Mayo MD;  Location: [facility] OR [address] [place] [address];  Service: Urology;  Laterality: N/A;    SPINAL CORD STIMULATOR REMOVAL      before [place]    SPINE SURGERY  5-13-13    CERVICAL FUSION    TONSILLECTOMY         Review of patient's allergies indicates:   Allergen Reactions    (d)-limonene flavor      Other reaction(s): difficult intubation  Other reaction(s): Difficulty breathing    Bactrim [sulfamethoxazole-trimethoprim] Anaphylaxis    Benadryl [diphenhydramine hcl] Shortness Of Breath    Fentanyl  Itching, Nausea And Vomiting and Swelling             Imitrex [sumatriptan succinate] Shortness Of Breath    Percocet [oxycodone-acetaminophen] Shortness Of Breath    Topamax [topiramate] Shortness Of Breath    Vancomycin Anaphylaxis     Rash    Butorphanol tartrate     Darvocet a500 [propoxyphene n-acetaminophen]      Other reaction(s): Difficulty breathing    Evening primrose (oenothera biennis)     Lyrica [pregabalin] Other (See Comments)     tremors    White petrolatum-zinc     Zinc oxide-white petrolatum      Other reaction(s): Difficulty breathing    Latex, natural rubber Itching and Rash    Phenytoin Rash and Other (See Comments)     Trouble breathing       No current facility-administered medications on file prior to encounter.     Current Outpatient Medications on File Prior to Encounter   Medication Sig    aspirin (ECOTRIN) 81 MG EC tablet Take 1 tablet (81 mg total) by mouth once daily.    atorvastatin (LIPITOR) 80 MG tablet TAKE ONE TABLET BY MOUTH EVERY DAY (Patient taking differently: Take 80 mg by mouth once daily.)    butalbital-acetaminophen-caffeine -40 mg (FIORICET, ESGIC) -40 mg per tablet Take 1 tablet by mouth daily as needed.    cyanocobalamin, vitamin B-12, (VITAMIN B-12) 5,000 mcg Subl Place 1 tablet under the tongue once daily.    docusate sodium (COLACE) 100 MG capsule Take 200 mg by mouth every evening.    fludrocortisone (FLORINEF) 0.1 mg Tab Take 1 tablet (100 mcg total) by mouth once daily.    FLUoxetine 20 MG capsule Take 3 capsules (60 mg total) by mouth once daily.    fluticasone propionate (FLONASE) 50 mcg/actuation nasal spray 2 sprays (100 mcg total) by Each Nostril route once daily.    furosemide (LASIX) 40 MG tablet Take 1 tablet (40 mg total) by mouth once daily.    HYDROcodone-acetaminophen (NORCO)  mg per tablet Take 1 tablet by mouth every 6 (six) hours as needed for breakthrough pain.    hydrocortisone (CORTEF) 10 MG Tab Take 1  tablet (10 mg total) by mouth once daily.    intrathecal pain pump compound Hydromorphone (7.5 mg/mL) infusion at 8.59 mg/day (0.3578 mg/hr) out of a total reservoir volume of 40 mL  Pump filled monthly    ketorolac (TORADOL) 10 mg tablet Take 10 mg by mouth daily as needed.    levothyroxine (SYNTHROID) 150 MCG tablet Take 1 tablet (150 mcg total) by mouth before breakfast.    LIDOcaine (LIDODERM) 5 % Place 1 patch onto the skin once daily. Remove & Discard patch within 12 hours or as directed by MD    liothyronine (CYTOMEL) 25 MCG Tab Take 1 tablet (25 mcg total) by mouth once daily.    mirabegron (MYRBETRIQ) 50 mg Tb24 Take 1 tablet (50 mg total) by mouth once daily.    nitroGLYCERIN (NITROSTAT) 0.4 MG SL tablet Place 0.4 mg under the tongue every 5 (five) minutes as needed for Chest pain.    ondansetron (ZOFRAN-ODT) 4 MG TbDL Take 4 mg by mouth every 4 to 6 hours as needed.    pantoprazole (PROTONIX) 40 MG tablet Take 1 tablet (40 mg total) by mouth once daily.    potassium chloride SA (K-DUR,KLOR-CON) 20 MEQ tablet Take 1 tablet (20 mEq total) by mouth once daily.    QUEtiapine (SEROQUEL) 100 MG Tab TAKE 2 TABLETS (200 MG) BY MOUTH NIGHTLY (Patient taking differently: Take 200 mg by mouth nightly.)    senna (SENNA LAX) 8.6 mg tablet Take 2 tablets by mouth 2 (two) times daily.    tiotropium bromide (SPIRIVA RESPIMAT) 2.5 mcg/actuation inhaler Inhale 2 puffs into the lungs once daily. Controller    tiZANidine (ZANAFLEX) 4 MG tablet Take 4 mg by mouth 2 (two) times daily as needed.    traZODone (DESYREL) 300 MG tablet Take 1 tablet (300 mg total) by mouth every evening.    promethazine (PHENERGAN) 25 MG tablet Take 25 mg by mouth every 6 (six) hours as needed for Nausea.    [DISCONTINUED] dicloxacillin (DYNAPEN) 500 MG capsule dicloxacillin 500 mg capsule   TAKE 1 CAPSULE BY MOUTH 4 TIMES A DAY FOR 8 DAYS    [DISCONTINUED] HYDROcodone-acetaminophen (NORCO)  mg per tablet hydrocodone 10  mg-acetaminophen 325 mg tablet    [DISCONTINUED] LIDOcaine (LIDODERM) 5 % lidocaine 5 % topical patch   Apply 1 patch every 12 hours by topical route for 30 days.     Family History       Problem Relation (Age of Onset)    Cancer Mother (55), Father    Cirrhosis Paternal Aunt, Paternal Uncle    Colon cancer Maternal Uncle    Esophageal cancer Father    Heart disease Maternal Uncle    Liver disease Paternal Aunt, Paternal Uncle    No Known Problems Brother, Brother, Sister, Maternal Aunt, Maternal Grandfather, Paternal Grandmother, Paternal Grandfather    Parkinsonism Maternal Grandmother    Tremor Maternal Grandmother          Tobacco Use    Smoking status: Never    Smokeless tobacco: Never   Substance and Sexual Activity    Alcohol use: Never    Drug use: No    Sexual activity: Never     Partners: Male     Review of Systems   Unable to perform ROS: Acuity of condition   Objective:     Vital Signs (Most Recent):  Temp: 99.3 °F (37.4 °C) (04/28/23 1400)  Pulse: 76 (04/28/23 1400)  Resp: 20 (04/28/23 1400)  BP: (!) 107/51 (04/28/23 1400)  SpO2: (!) 94 % (04/28/23 1400)   Vital Signs (24h Range):  Temp:  [98.9 °F (37.2 °C)-100.4 °F (38 °C)] 99.3 °F (37.4 °C)  Pulse:  [] 76  Resp:  [18-40] 20  SpO2:  [89 %-98 %] 94 %  BP: ()/(36-68) 107/51     Weight: 106.6 kg (235 lb)  Body mass index is 36.81 kg/m².    Physical Exam  Vitals reviewed.   Constitutional:       General: She is not in acute distress.     Appearance: She is well-developed. She is ill-appearing. She is not diaphoretic.   HENT:      Head: Normocephalic and atraumatic.      Nose: Nose normal.   Eyes:      General: No scleral icterus.     Pupils: Pupils are equal, round, and reactive to light.   Neck:      Vascular: No JVD.      Trachea: No tracheal deviation.   Cardiovascular:      Rate and Rhythm: Normal rate and regular rhythm.      Heart sounds: Normal heart sounds.   Pulmonary:      Effort: Pulmonary effort is normal. No respiratory  distress.      Breath sounds: Rales present.      Comments: On supplemental oxygen  Abdominal:      General: There is no distension.      Palpations: Abdomen is soft.      Tenderness: There is no abdominal tenderness.   Musculoskeletal:         General: No deformity.      Cervical back: Normal range of motion.      Right lower leg: Edema present.      Left lower leg: Edema present.   Skin:     General: Skin is warm and dry.      Findings: No rash.   Neurological:      Mental Status: She is oriented to person, place, and time. She is lethargic.   Psychiatric:         Behavior: Behavior normal.         CRANIAL NERVES     CN III, IV, VI   Pupils are equal, round, and reactive to light.     Significant Labs: All pertinent labs within the past 24 hours have been reviewed.    Significant Imaging: I have reviewed all pertinent imaging results/findings within the past 24 hours.    Assessment/Plan:     * Sepsis with encephalopathy and septic shock  -presents with septic shock presumably due to multifocal pneumonia; also likely component of adrenal insufficiency  -mild encephalopathy, lethargy  -fluid resuscitated in the ED without appropriate response   -initiate on Levophed   -blood cultures collected in the ED   -initial lactic acid 2.6; has improved upon reassessment  -initiate on broad-spectrum antibiotics; she is grown ESBL and MSSA in the past  -transfer to ICU      Multifocal pneumonia  -chest x-ray concerning for multifocal pneumonia   -initiated on broad-spectrum antibiotics   -respiratory sputum cultures and blood cultures pending   -see septic shock      Acute respiratory failure with hypoxia and hypercarbia  Patient with Hypercapnic and Hypoxic Respiratory failure which is Acute on chronic.  she is on home oxygen at 2 LPM. Supplemental oxygen was provided and noted-      .   Signs/symptoms of respiratory failure include- respiratory distress and lethargy. Contributing diagnoses includes - Pneumonia Labs and  images were reviewed. Patient Has recent ABG, which has been reviewed. Will treat underlying causes and adjust management of respiratory failure as follows-     -treat pneumonia  -continue home inhaler regimen  -incentive spirometry  -nightly CPAP; did not use last night    Adrenal insufficiency  -stress dose IV hydrocortisone      History of stroke with residual deficit        Debility  -will need PT/OT once hemodynamically stable; will likely resume home health      Chronic diastolic heart failure  -BNP within normal range; does have some leg swelling but this is likely secondary to her lymphedema      Mixed stress and urge urinary incontinence  -oxybutynin      Suprapubic catheter  -in place   -collect UA      Insomnia due to medical condition  -continue home trazodone      S/P insertion of intrathecal pump  -noted      S/P insertion of spinal cord stimulator  -noted      Lymphedema of both lower extremities  -noted   -IV Lasix      Primary hypothyroidism  -resume home thyroid medication   -check TSH      Neurogenic bladder  -continue home oxybutynin      Coronary artery disease of native artery with stable angina pectoris  -continue home aspirin and statin      Narcotic dependency, continuous  -on intrathecal pain pump with breakthrough Norco      Chronic pain syndrome  -noted   -on intrathecal pain pump with p.r.n. hydrocodone      Major depressive disorder, recurrent, mild  Patient has recurrent depression which is mild and is currently controlled. Will Continue anti-depressant medications. We will not consult psychiatry at this time. Patient does not display psychosis at this time. Continue to monitor closely and adjust plan of care as needed.        Sleep apnea  -resume home CPAP      Generalized anxiety disorder  -continue home meds        VTE Risk Mitigation (From admission, onward)         Ordered     enoxaparin injection 40 mg  Daily         04/28/23 1115     IP VTE HIGH RISK PATIENT  Once          04/28/23 1115     Place sequential compression device  Until discontinued         04/28/23 1115              Critical care time spent on the evaluation and treatment of severe organ dysfunction, review of pertinent labs and imaging studies, discussions with consulting providers and discussions with patient/family: 50 minutes.             Nic Mistry MD  Department of Hospital Medicine  [place] - Intensive Care

## 2023-04-28 NOTE — CONSULTS
Pulm/CC Fellow Consult Note    Attending Physician: Nic Mistry, *    Date of Admit: 4/28/2023    Reason for Consult: MICU admission, septic     History of Present Illness:  Tasha Hawley is a 67 y.o.  female with a PMHx of anxiety & depression, adrenal insufficiency, CAD s/p ACS (1/2016), ischemic CM, CHF, chronic LE edema, chronic low back pain s/p indwelling opioid pain pump, suprapubic catheter, hypothyroidism, CECI on CPAP, chronic respiratory failure on home oxygen who presented to the ED for SOB.     The patient notes several days of cough productive of green sputum. She notes no fevers, chills, night sweats, sick contacts. She has had some heartburn and constipation, but no n/v/abdominal pain. She has noticed bladder spasms and LE swelling and complains of chest pain that is pounding. She is chronically on 3-5L of O2 and reports no prior pulmonary problems until recently when she was placed on oxygen. She is a never smoker. In regards to her ADLs - she is able to brush her hair and teeth by herself and most days she's able to put her clothes on by herself, but sometimes she needs help. She lives at home with her . The patient is easily arousable on interview, but she will quickly fall asleep.       On presentation, the patient was febrile (38.0C), mildly tachycardic with normal blood pressure initially, but tachypneic. She was placed on 15L NC weaned to 7LNC, but developed hypotension requiring low dose levophed. CBC showed chronic normocytic anemia, no leukocytosis. Chemistry was remarkable only for mild hypokalemia and noted chronically elevated bicarbonate. BNP was normal as was troponin and lactic acid was 2.6. Procalcitonin was normal.     CXR showed b/l pulmonary opacities with what appears to be b/l effusion L > R.     Past Medical History:  Past Medical History:   Diagnosis Date    Anticoagulant long-term use     Anxiety     Arthritis     Bilateral lower extremity edema      severe chronic    Carotid artery occlusion     Cataract     CHF (congestive heart failure)     Coronary artery disease     subtotalled LAD with collateral    Depression     Fever blister     Hard of hearing     Hypokalemia 1/9/2023    Hyponatremia 2/4/2022    Hypothyroid     Iron deficiency anemia     Lumbar radiculopathy     with chronic pain    Ocular migraine     Renal disorder     Sleep apnea     cpap       Past Surgical History:  Past Surgical History:   Procedure Laterality Date    ADENOIDECTOMY      BACK SURGERY      x 12    CARDIAC CATHETERIZATION  2016    subtotalled LAD with right to left collaterals    CATARACT EXTRACTION W/  INTRAOCULAR LENS IMPLANT Left     Dr Coleman     CYSTOSCOPIC LITHOLAPAXY N/A 6/27/2019    Procedure: CYSTOLITHOLAPAXY;  Surgeon: Shireen Mayo MD;  Location: [facility] OR 2ND FLR;  Service: Urology;  Laterality: N/A;    CYSTOSCOPIC LITHOLAPAXY N/A 9/3/2019    Procedure: CYSTOLITHOLAPAXY;  Surgeon: Shireen Mayo MD;  Location: [facility] OR 2ND FLR;  Service: Urology;  Laterality: N/A;    CYSTOSCOPY N/A 7/13/2021    Procedure: CYSTOSCOPY;  Surgeon: Shireen Mayo MD;  Location: [facility] OR 1ST FLR;  Service: Urology;  Laterality: N/A;    CYSTOSCOPY  11/16/2021    Procedure: CYSTOSCOPY;  Surgeon: Shireen Mayo MD;  [facility];  Service: Urology;;    CYSTOSCOPY  7/19/2022    Procedure: CYSTOSCOPY;  Surgeon: Shireen Mayo MD;  [facility];  Service: Urology;;    CYSTOSCOPY WITH INJECTION OF PERIURETHRAL BULKING AGENT  7/19/2022    Procedure: CYSTOSCOPY, WITH PERIURETHRAL BULKING AGENT INJECTION-MACROPLASTIQUE;  Surgeon: Shireen Mayo MD;  Location: [facility] OR 1ST FLR;  Service: Urology;;    CYSTOSCOPY WITH INJECTION OF PERIURETHRAL BULKING AGENT N/A 3/28/2023    Procedure: CYSTOSCOPY, WITH PERIURETHRAL BULKING AGENT INJECTION;  Surgeon: Shireen Mayo MD;  [facility];  Service: Urology;  Laterality: N/A;  [place]    CYSTOSCOPY,WITH  BOTULINUM TOXIN INJECTION N/A 12/13/2022    Procedure: CYSTOSCOPY,WITH BOTULINUM TOXIN INJECTION;  Surgeon: Shireen Mayo MD;  [facility];  Service: Urology;  Laterality: N/A;  300 U    CYSTOSCOPY,WITH BOTULINUM TOXIN INJECTION N/A 3/28/2023    Procedure: CYSTOSCOPY,WITH BOTULINUM TOXIN INJECTION;  Surgeon: Shireen Mayo MD;  [facility];  Service: Urology;  Laterality: N/A;  45 MIN.    300 UNITS    ESOPHAGOGASTRODUODENOSCOPY N/A 5/23/2018    Procedure: ESOPHAGOGASTRODUODENOSCOPY (EGD);  Surgeon: Prince Vance MD;  Location: [facility] (4TH FLR);  Service: Endoscopy;  Laterality: N/A;  r/s 'd per Dr. Vance due to family emergency- ER    HYSTERECTOMY  1975    endometriosis    INJECTION OF BOTULINUM TOXIN TYPE A  7/13/2021    Procedure: INJECTION, BOTULINUM TOXIN, 200units;  Surgeon: Shireen Mayo MD;  [facility];  Service: Urology;;    INJECTION OF BOTULINUM TOXIN TYPE A  11/16/2021    Procedure: INJECTION, BOTULINUM TOXIN, 200units;  Surgeon: Shireen Mayo MD;  [facility];  Service: Urology;;    INJECTION OF BOTULINUM TOXIN TYPE A  7/19/2022    Procedure: INJECTION, BOTULINUM TOXIN, 300 units ;  Surgeon: Shireen Mayo MD;  [facility];  Service: Urology;;    INSERTION, SUPRAPUBIC CATHETER N/A 12/13/2022    Procedure: INSERTION, SUPRAPUBIC CATHETER;  Surgeon: Shireen Mayo MD;  [facility];  Service: Urology;  Laterality: N/A;  exchange    pain pump placement      SQ Dilaudid Pump managed by Dr. Hillman, Pain Management    REMOVAL OF BONE SPUR OF FOOT Bilateral 9/16/2022    Procedure: EXCISION ARTHRITIC BONE, BILATERAL FOOT;  Surgeon: Adam Mcguire DPM;  Location: [facility];  Service: Podiatry;  Laterality: Bilateral;    REPLACEMENT OF CATHETER N/A 10/31/2019    Procedure: REPLACEMENT, CATHETER-SUPRAPUBIC;  Surgeon: Shireen Mayo MD;  Location: [facility] OR [address] [place] [address];  Service: Urology;   Laterality: N/A;    SPINAL CORD STIMULATOR REMOVAL      before [place]    SPINE SURGERY  5-13-13    CERVICAL FUSION    TONSILLECTOMY         Allergies:  Review of patient's allergies indicates:   Allergen Reactions    (d)-limonene flavor      Other reaction(s): difficult intubation  Other reaction(s): Difficulty breathing    Bactrim [sulfamethoxazole-trimethoprim] Anaphylaxis    Benadryl [diphenhydramine hcl] Shortness Of Breath    Fentanyl Itching, Nausea And Vomiting and Swelling             Imitrex [sumatriptan succinate] Shortness Of Breath    Percocet [oxycodone-acetaminophen] Shortness Of Breath    Topamax [topiramate] Shortness Of Breath    Vancomycin Anaphylaxis     Rash    Butorphanol tartrate     Darvocet a500 [propoxyphene n-acetaminophen]      Other reaction(s): Difficulty breathing    Evening primrose (oenothera biennis)     Lyrica [pregabalin] Other (See Comments)     tremors    White petrolatum-zinc     Zinc oxide-white petrolatum      Other reaction(s): Difficulty breathing    Latex, natural rubber Itching and Rash    Phenytoin Rash and Other (See Comments)     Trouble breathing       Family History:  Family History   Problem Relation Age of Onset    Cancer Mother 55        breast    Cancer Father         esophagus,had laryngectomy    Esophageal cancer Father     Parkinsonism Maternal Grandmother     Tremor Maternal Grandmother     No Known Problems Brother     No Known Problems Brother     Heart disease Maternal Uncle     Colon cancer Maternal Uncle         Less than 60    No Known Problems Sister     No Known Problems Maternal Aunt     Cirrhosis Paternal Aunt         ETOH    Liver disease Paternal Aunt         ETOH    Liver disease Paternal Uncle         ETOH    Cirrhosis Paternal Uncle         ETOH    No Known Problems Maternal Grandfather     No Known Problems Paternal Grandmother     No Known Problems Paternal Grandfather     Melanoma Neg Hx     Amblyopia Neg Hx     Blindness Neg Hx      Cataracts Neg Hx     Diabetes Neg Hx     Glaucoma Neg Hx     Hypertension Neg Hx     Macular degeneration Neg Hx     Retinal detachment Neg Hx     Strabismus Neg Hx     Stroke Neg Hx     Thyroid disease Neg Hx     Celiac disease Neg Hx     Colon polyps Neg Hx     Cystic fibrosis Neg Hx     Crohn's disease Neg Hx     Inflammatory bowel disease Neg Hx     Liver cancer Neg Hx     Rectal cancer Neg Hx     Stomach cancer Neg Hx     Ulcerative colitis Neg Hx     Lymphoma Neg Hx        Social History:  Social History     Tobacco Use    Smoking status: Never    Smokeless tobacco: Never   Substance Use Topics    Alcohol use: Never    Drug use: No       Review of Systems:  Review of Systems   Constitutional:  Negative for chills and fever.   HENT:  Positive for congestion.    Respiratory:  Positive for cough, sputum production and shortness of breath. Negative for hemoptysis.    Cardiovascular:  Positive for chest pain and leg swelling.   Gastrointestinal:  Positive for constipation and heartburn. Negative for abdominal pain, diarrhea, nausea and vomiting.   Genitourinary:         Bladder spasms   Neurological:  Negative for dizziness.      Objective:   Last 24 Hour Vital Signs:  BP  Min: 76/36  Max: 123/68  Temp  Av.5 °F (37.5 °C)  Min: 98.9 °F (37.2 °C)  Max: 100.4 °F (38 °C)  Pulse  Av.1  Min: 67  Max: 114  Resp  Av.6  Min: 18  Max: 40  SpO2  Av.5 %  Min: 89 %  Max: 98 %  Weight  Av.6 kg (235 lb)  Min: 106.6 kg (235 lb)  Max: 106.6 kg (235 lb)  Body mass index is 36.81 kg/m².  No intake/output data recorded.    Physical Exam:  General: Alert and awake in NAD  HENT:  NCAT; anicteric sclera; OP clear with MMM  Cardio:  Regular rate and rhythm with normal S1 and S2; no murmurs or rubs  Resp:  CTAB; respirations unlabored; no wheezes, crackles or rhonchi  Abdom:  Soft, Non-tender  Extrem:  WWP with no clubbing, cyanosis or edema  Pulses:  2+ and symmetric distally  Neuro:  AAOx3; cooperative and  pleasant with no focal deficits    Bedside U/S: small L pleural effusion, R side clear, no pericardial effusion, EF appears grossly normal     Assessment & Plan:   Tasha Hawley is a 67 y.o.  female with a PMHx of anxiety & depression, CAD s/p ACS (1/2016), ischemic CM, CHF, chronic LE edema, chronic low back pain s/p indwelling opioid pain pump, suprapubic catheter, hypothyroidism, CECI on CPAP, chronic respiratory failure on home oxygen (3-5L) admitted for acute hypoxemic respiratory failure. She has been weaned back down to her home oxygen in the ICU.     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)  - CECI: CPAP at night & with naps    GI  - hx of constipation 2/2 chronic opioid use - scheduled bowel regimen with miralax and senna  - continue home protonix    Renal  - chronic metabolic alkalosis - likely secondary to incomplete adherence to CPAP for CECI  - monitor daily BMP    Heme  - chronic normocytic anemia - outpt follow up, continue home B12    ID  - blood cultures x2 in process  - respiratory culture ordered, not collected yet  - legionella and strep PNA ag in process  - flu/COVID negative, procal normal  - consider respiratory viral panel  - hx of ESBL E.Coli - on meropenem  - started on doxycycline given concern for multifocal PNA (atypical PNA coverage) and MRSA coverage  - MRSA nares ordered    Endocrine  - continue home synthroid, TSH lab pending  - reported hx of adrenal insufficiency dx by inadequate response to cosyntropin stimulation test - appears to be on 10mg outpatient hydrocortisone, continue stress dose steroids  given shock    F: PO diet  A: intra-thecal pain pump  S: trazodone, seroquel  T: lovenox  H: elevated  U: protonix  G: no insulin, monitor on stress dose steroids  S: n/a (SAT/SBT)    B: miralax, senna   I: suprapubic cath  D: meropenem - pending cultures, can de-escalate    Josefa Campos  Fellow  Pulmonary & CCM

## 2023-04-28 NOTE — ED NOTES
Report received. Assumed care of patient.Lethargic at this time. Pt responds to tactile simulation at this time. Pt has delayed responses at this time. Pt immediatly falls back asleep after simulation.

## 2023-04-29 ENCOUNTER — DOCUMENT SCAN (OUTPATIENT)
Dept: HOME HEALTH SERVICES | Facility: HOSPITAL | Age: 68
End: 2023-04-29
Payer: MEDICARE

## 2023-04-29 LAB
ADENOVIRUS: NOT DETECTED
ANION GAP SERPL CALC-SCNC: 12 MMOL/L (ref 8–16)
BASOPHILS # BLD AUTO: 0 K/UL (ref 0–0.2)
BASOPHILS NFR BLD: 0 % (ref 0–1.9)
BORDETELLA PARAPERTUSSIS (IS1001): NOT DETECTED
BORDETELLA PERTUSSIS (PTXP): NOT DETECTED
BUN SERPL-MCNC: 9 MG/DL (ref 8–23)
CALCIUM SERPL-MCNC: 9.3 MG/DL (ref 8.7–10.5)
CHLAMYDIA PNEUMONIAE: NOT DETECTED
CHLORIDE SERPL-SCNC: 97 MMOL/L (ref 95–110)
CO2 SERPL-SCNC: 32 MMOL/L (ref 23–29)
CORONAVIRUS 229E, COMMON COLD VIRUS: NOT DETECTED
CORONAVIRUS HKU1, COMMON COLD VIRUS: NOT DETECTED
CORONAVIRUS NL63, COMMON COLD VIRUS: NOT DETECTED
CORONAVIRUS OC43, COMMON COLD VIRUS: NOT DETECTED
CREAT SERPL-MCNC: 0.8 MG/DL (ref 0.5–1.4)
DIFFERENTIAL METHOD: ABNORMAL
EOSINOPHIL # BLD AUTO: 0 K/UL (ref 0–0.5)
EOSINOPHIL NFR BLD: 0 % (ref 0–8)
ERYTHROCYTE [DISTWIDTH] IN BLOOD BY AUTOMATED COUNT: 14.5 % (ref 11.5–14.5)
EST. GFR  (NO RACE VARIABLE): >60 ML/MIN/1.73 M^2
FLUBV RNA NPH QL NAA+NON-PROBE: NOT DETECTED
GLUCOSE SERPL-MCNC: 185 MG/DL (ref 70–110)
HCT VFR BLD AUTO: 28.3 % (ref 37–48.5)
HGB BLD-MCNC: 8.7 G/DL (ref 12–16)
HPIV1 RNA NPH QL NAA+NON-PROBE: NOT DETECTED
HPIV2 RNA NPH QL NAA+NON-PROBE: NOT DETECTED
HPIV3 RNA NPH QL NAA+NON-PROBE: NOT DETECTED
HPIV4 RNA NPH QL NAA+NON-PROBE: NOT DETECTED
HUMAN METAPNEUMOVIRUS: NOT DETECTED
IMM GRANULOCYTES # BLD AUTO: 0.08 K/UL (ref 0–0.04)
IMM GRANULOCYTES NFR BLD AUTO: 0.6 % (ref 0–0.5)
INFLUENZA A (SUBTYPES H1,H1-2009,H3): NOT DETECTED
LYMPHOCYTES # BLD AUTO: 0.5 K/UL (ref 1–4.8)
LYMPHOCYTES NFR BLD: 3.3 % (ref 18–48)
MAGNESIUM SERPL-MCNC: 1.8 MG/DL (ref 1.6–2.6)
MCH RBC QN AUTO: 26.8 PG (ref 27–31)
MCHC RBC AUTO-ENTMCNC: 30.7 G/DL (ref 32–36)
MCV RBC AUTO: 87 FL (ref 82–98)
MONOCYTES # BLD AUTO: 0.4 K/UL (ref 0.3–1)
MONOCYTES NFR BLD: 2.6 % (ref 4–15)
MYCOPLASMA PNEUMONIAE: NOT DETECTED
NEUTROPHILS # BLD AUTO: 12.7 K/UL (ref 1.8–7.7)
NEUTROPHILS NFR BLD: 93.5 % (ref 38–73)
NRBC BLD-RTO: 0 /100 WBC
PLATELET # BLD AUTO: 221 K/UL (ref 150–450)
PMV BLD AUTO: 9.5 FL (ref 9.2–12.9)
POTASSIUM SERPL-SCNC: 3.1 MMOL/L (ref 3.5–5.1)
RBC # BLD AUTO: 3.25 M/UL (ref 4–5.4)
RESPIRATORY INFECTION PANEL SOURCE: NORMAL
RSV RNA NPH QL NAA+NON-PROBE: NOT DETECTED
RV+EV RNA NPH QL NAA+NON-PROBE: NOT DETECTED
SARS-COV-2 RNA RESP QL NAA+PROBE: NOT DETECTED
SODIUM SERPL-SCNC: 141 MMOL/L (ref 136–145)
WBC # BLD AUTO: 13.54 K/UL (ref 3.9–12.7)

## 2023-04-29 PROCEDURE — 27000190 HC CPAP FULL FACE MASK W/VALVE: Mod: HCNC

## 2023-04-29 PROCEDURE — 25000242 PHARM REV CODE 250 ALT 637 W/ HCPCS: Mod: HCNC | Performed by: HOSPITALIST

## 2023-04-29 PROCEDURE — 87798 DETECT AGENT NOS DNA AMP: CPT | Mod: HCNC | Performed by: STUDENT IN AN ORGANIZED HEALTH CARE EDUCATION/TRAINING PROGRAM

## 2023-04-29 PROCEDURE — 99900035 HC TECH TIME PER 15 MIN (STAT): Mod: HCNC

## 2023-04-29 PROCEDURE — 83735 ASSAY OF MAGNESIUM: CPT | Mod: HCNC | Performed by: HOSPITALIST

## 2023-04-29 PROCEDURE — 80048 BASIC METABOLIC PNL TOTAL CA: CPT | Mod: HCNC | Performed by: HOSPITALIST

## 2023-04-29 PROCEDURE — 94799 UNLISTED PULMONARY SVC/PX: CPT | Mod: HCNC

## 2023-04-29 PROCEDURE — 63600175 PHARM REV CODE 636 W HCPCS: Mod: HCNC | Performed by: STUDENT IN AN ORGANIZED HEALTH CARE EDUCATION/TRAINING PROGRAM

## 2023-04-29 PROCEDURE — 63600175 PHARM REV CODE 636 W HCPCS: Mod: HCNC | Performed by: HOSPITALIST

## 2023-04-29 PROCEDURE — 93010 EKG 12-LEAD: ICD-10-PCS | Mod: HCNC,,, | Performed by: INTERNAL MEDICINE

## 2023-04-29 PROCEDURE — 63600175 PHARM REV CODE 636 W HCPCS: Mod: HCNC

## 2023-04-29 PROCEDURE — 94761 N-INVAS EAR/PLS OXIMETRY MLT: CPT | Mod: HCNC

## 2023-04-29 PROCEDURE — 25000003 PHARM REV CODE 250: Mod: HCNC

## 2023-04-29 PROCEDURE — A4216 STERILE WATER/SALINE, 10 ML: HCPCS | Mod: HCNC | Performed by: HOSPITALIST

## 2023-04-29 PROCEDURE — 93005 ELECTROCARDIOGRAM TRACING: CPT | Mod: HCNC

## 2023-04-29 PROCEDURE — 94640 AIRWAY INHALATION TREATMENT: CPT | Mod: HCNC

## 2023-04-29 PROCEDURE — 25000003 PHARM REV CODE 250: Mod: HCNC | Performed by: HOSPITALIST

## 2023-04-29 PROCEDURE — 27000221 HC OXYGEN, UP TO 24 HOURS: Mod: HCNC

## 2023-04-29 PROCEDURE — 85025 COMPLETE CBC W/AUTO DIFF WBC: CPT | Mod: HCNC | Performed by: HOSPITALIST

## 2023-04-29 PROCEDURE — 93010 ELECTROCARDIOGRAM REPORT: CPT | Mod: HCNC,,, | Performed by: INTERNAL MEDICINE

## 2023-04-29 PROCEDURE — 94660 CPAP INITIATION&MGMT: CPT | Mod: HCNC

## 2023-04-29 PROCEDURE — 11000001 HC ACUTE MED/SURG PRIVATE ROOM: Mod: HCNC

## 2023-04-29 PROCEDURE — 36415 COLL VENOUS BLD VENIPUNCTURE: CPT | Mod: HCNC | Performed by: HOSPITALIST

## 2023-04-29 RX ORDER — HYDROXYZINE PAMOATE 25 MG/1
25 CAPSULE ORAL EVERY 8 HOURS PRN
Status: DISCONTINUED | OUTPATIENT
Start: 2023-04-29 | End: 2023-04-29

## 2023-04-29 RX ORDER — POTASSIUM CHLORIDE 20 MEQ/1
40 TABLET, EXTENDED RELEASE ORAL ONCE
Status: DISCONTINUED | OUTPATIENT
Start: 2023-04-29 | End: 2023-04-29

## 2023-04-29 RX ORDER — SODIUM CHLORIDE 9 MG/ML
INJECTION, SOLUTION INTRAVENOUS
Status: DISCONTINUED | OUTPATIENT
Start: 2023-04-29 | End: 2023-05-05 | Stop reason: HOSPADM

## 2023-04-29 RX ORDER — MAGNESIUM SULFATE HEPTAHYDRATE 40 MG/ML
2 INJECTION, SOLUTION INTRAVENOUS ONCE
Status: COMPLETED | OUTPATIENT
Start: 2023-04-29 | End: 2023-04-29

## 2023-04-29 RX ORDER — HYDROXYZINE HYDROCHLORIDE 25 MG/1
25 TABLET, FILM COATED ORAL EVERY 4 HOURS PRN
Status: DISCONTINUED | OUTPATIENT
Start: 2023-04-29 | End: 2023-04-29

## 2023-04-29 RX ORDER — HYDROXYZINE HYDROCHLORIDE 25 MG/1
25 TABLET, FILM COATED ORAL EVERY 4 HOURS PRN
Status: DISCONTINUED | OUTPATIENT
Start: 2023-04-29 | End: 2023-04-30

## 2023-04-29 RX ORDER — FUROSEMIDE 10 MG/ML
40 INJECTION INTRAMUSCULAR; INTRAVENOUS ONCE
Status: COMPLETED | OUTPATIENT
Start: 2023-04-29 | End: 2023-04-29

## 2023-04-29 RX ORDER — HYDROCODONE BITARTRATE AND ACETAMINOPHEN 10; 325 MG/1; MG/1
1 TABLET ORAL EVERY 4 HOURS PRN
Status: DISCONTINUED | OUTPATIENT
Start: 2023-04-29 | End: 2023-05-05 | Stop reason: HOSPADM

## 2023-04-29 RX ORDER — POTASSIUM CHLORIDE 20 MEQ/1
20 TABLET, EXTENDED RELEASE ORAL ONCE
Status: DISCONTINUED | OUTPATIENT
Start: 2023-04-29 | End: 2023-04-29

## 2023-04-29 RX ORDER — POTASSIUM CHLORIDE 7.45 MG/ML
10 INJECTION INTRAVENOUS
Status: COMPLETED | OUTPATIENT
Start: 2023-04-29 | End: 2023-04-29

## 2023-04-29 RX ADMIN — FLUDROCORTISONE ACETATE 100 MCG: 0.1 TABLET ORAL at 09:04

## 2023-04-29 RX ADMIN — HYDROXYZINE HYDROCHLORIDE 25 MG: 25 TABLET, FILM COATED ORAL at 06:04

## 2023-04-29 RX ADMIN — HYDROCODONE BITARTRATE AND ACETAMINOPHEN 1 TABLET: 10; 325 TABLET ORAL at 05:04

## 2023-04-29 RX ADMIN — SODIUM CHLORIDE: 9 INJECTION, SOLUTION INTRAVENOUS at 09:04

## 2023-04-29 RX ADMIN — FUROSEMIDE 40 MG: 10 INJECTION, SOLUTION INTRAMUSCULAR; INTRAVENOUS at 10:04

## 2023-04-29 RX ADMIN — MAGNESIUM SULFATE 2 G: 2 INJECTION INTRAVENOUS at 09:04

## 2023-04-29 RX ADMIN — LIDOCAINE 1 PATCH: 50 PATCH CUTANEOUS at 09:04

## 2023-04-29 RX ADMIN — OXYBUTYNIN CHLORIDE 10 MG: 5 TABLET, EXTENDED RELEASE ORAL at 09:04

## 2023-04-29 RX ADMIN — HYDROCORTISONE SODIUM SUCCINATE 100 MG: 250 INJECTION, POWDER, FOR SOLUTION INTRAMUSCULAR; INTRAVENOUS at 05:04

## 2023-04-29 RX ADMIN — TIOTROPIUM BROMIDE INHALATION SPRAY 2 PUFF: 3.12 SPRAY, METERED RESPIRATORY (INHALATION) at 09:04

## 2023-04-29 RX ADMIN — MEROPENEM 1 G: 1 INJECTION, POWDER, FOR SOLUTION INTRAVENOUS at 06:04

## 2023-04-29 RX ADMIN — ENOXAPARIN SODIUM 40 MG: 40 INJECTION SUBCUTANEOUS at 05:04

## 2023-04-29 RX ADMIN — HYDROCORTISONE SODIUM SUCCINATE 50 MG: 250 INJECTION, POWDER, FOR SOLUTION INTRAMUSCULAR; INTRAVENOUS at 09:04

## 2023-04-29 RX ADMIN — Medication 10 ML: at 05:04

## 2023-04-29 RX ADMIN — LIOTHYRONINE SODIUM 25 MCG: 25 TABLET ORAL at 09:04

## 2023-04-29 RX ADMIN — MEROPENEM 1 G: 1 INJECTION, POWDER, FOR SOLUTION INTRAVENOUS at 02:04

## 2023-04-29 RX ADMIN — POTASSIUM CHLORIDE 10 MEQ: 7.46 INJECTION, SOLUTION INTRAVENOUS at 02:04

## 2023-04-29 RX ADMIN — ASPIRIN 81 MG: 81 TABLET, COATED ORAL at 09:04

## 2023-04-29 RX ADMIN — ATORVASTATIN CALCIUM 80 MG: 40 TABLET, FILM COATED ORAL at 09:04

## 2023-04-29 RX ADMIN — QUETIAPINE FUMARATE 200 MG: 100 TABLET ORAL at 09:04

## 2023-04-29 RX ADMIN — DOXYCYCLINE 100 MG: 100 INJECTION, POWDER, LYOPHILIZED, FOR SOLUTION INTRAVENOUS at 03:04

## 2023-04-29 RX ADMIN — Medication 10 ML: at 02:04

## 2023-04-29 RX ADMIN — HYDROCODONE BITARTRATE AND ACETAMINOPHEN 1 TABLET: 10; 325 TABLET ORAL at 10:04

## 2023-04-29 RX ADMIN — FLUTICASONE PROPIONATE 100 MCG: 50 SPRAY, METERED NASAL at 09:04

## 2023-04-29 RX ADMIN — MEROPENEM 1 G: 1 INJECTION, POWDER, FOR SOLUTION INTRAVENOUS at 11:04

## 2023-04-29 RX ADMIN — POTASSIUM CHLORIDE 10 MEQ: 7.46 INJECTION, SOLUTION INTRAVENOUS at 01:04

## 2023-04-29 RX ADMIN — HYDROCORTISONE SODIUM SUCCINATE 50 MG: 250 INJECTION, POWDER, FOR SOLUTION INTRAMUSCULAR; INTRAVENOUS at 02:04

## 2023-04-29 RX ADMIN — PANTOPRAZOLE SODIUM 40 MG: 40 TABLET, DELAYED RELEASE ORAL at 09:04

## 2023-04-29 RX ADMIN — Medication 10 ML: at 09:04

## 2023-04-29 RX ADMIN — TRAZODONE HYDROCHLORIDE 300 MG: 100 TABLET ORAL at 09:04

## 2023-04-29 RX ADMIN — STANDARDIZED SENNA CONCENTRATE 2 TABLET: 8.6 TABLET ORAL at 09:04

## 2023-04-29 RX ADMIN — MUPIROCIN: 20 OINTMENT TOPICAL at 09:04

## 2023-04-29 RX ADMIN — Medication 0.02 MCG/KG/MIN: at 06:04

## 2023-04-29 RX ADMIN — HYDROXYZINE PAMOATE 25 MG: 25 CAPSULE ORAL at 12:04

## 2023-04-29 RX ADMIN — HYDROCODONE BITARTRATE AND ACETAMINOPHEN 1 TABLET: 10; 325 TABLET ORAL at 09:04

## 2023-04-29 RX ADMIN — POTASSIUM CHLORIDE 10 MEQ: 7.46 INJECTION, SOLUTION INTRAVENOUS at 04:04

## 2023-04-29 RX ADMIN — FLUOXETINE HYDROCHLORIDE 60 MG: 20 CAPSULE ORAL at 09:04

## 2023-04-29 RX ADMIN — CYANOCOBALAMIN TAB 1000 MCG 1000 MCG: 1000 TAB at 09:04

## 2023-04-29 RX ADMIN — LEVOTHYROXINE SODIUM 150 MCG: 75 TABLET ORAL at 05:04

## 2023-04-29 NOTE — PLAN OF CARE
Problem: Anxiety  Goal: Anxiety Reduction or Resolution  Outcome: Ongoing, Progressing   - Hydroxyzine given x 1    Problem: Respiratory Compromise (Pneumonia)  Goal: Effective Oxygenation and Ventilation  Outcome: Ongoing, Progressing   - 5L O2 per HFNC   - CPAP at night    Problem: Fluid Imbalance (Pneumonia)  Goal: Fluid Balance  Outcome: Ongoing, Progressing   - Lasix 40 mg IVP, UOP ~1L    Problem: Infection  Goal: Absence of Infection Signs and Symptoms  Outcome: Ongoing, Progressing   - Antibiotics given as ordered   - Respiratory infection panel sent as ordered    Problem: Adult Inpatient Plan of Care  Goal: Readiness for Transition of Care  Outcome: Ongoing, Progressing   - Transferred to Room 464 at 1545    Problem: Adult Inpatient Plan of Care  Goal: Optimal Comfort and Wellbeing  Outcome: Ongoing, Progressing   - Dilaudid pump (Patient states reservoir was refilled recently)   - Lidocaine patch applied to back this AM   - Norco Q4H for pain control    Problem: Adult Inpatient Plan of Care  Goal: Plan of Care Review  Outcome: Ongoing, Progressing   - Mg 1.8 --> Mg 2 gm IVPB administered   - K+ 3.1 --> patient unable to tolerate potassium pills and did not like disintegrating tablets/liquid --> receiving total of 30 mEq IVPB for replacements   No pertinent past medical history

## 2023-04-29 NOTE — PLAN OF CARE
Pt is AAOx4, laying in bed, in no distress. CPAP initiated at 30%, patient tolerating well. Levophed drip at 0.02 mcg/kg/min , plan to wean as BP tolerates.    Suprapubic catheter in place, with 600 ml of urine put out already.    Bed left in lowest position, call light within reach.      Problem: Adult Inpatient Plan of Care  Goal: Plan of Care Review  Outcome: Ongoing, Progressing  Goal: Patient-Specific Goal (Individualized)  Outcome: Ongoing, Progressing  Goal: Absence of Hospital-Acquired Illness or Injury  Outcome: Ongoing, Progressing  Goal: Optimal Comfort and Wellbeing  Outcome: Ongoing, Progressing  Goal: Readiness for Transition of Care  Outcome: Ongoing, Progressing     Problem: Infection  Goal: Absence of Infection Signs and Symptoms  Outcome: Ongoing, Progressing     Problem: Adjustment to Illness (Sepsis/Septic Shock)  Goal: Optimal Coping  Outcome: Ongoing, Progressing     Problem: Bleeding (Sepsis/Septic Shock)  Goal: Absence of Bleeding  Outcome: Ongoing, Progressing     Problem: Glycemic Control Impaired (Sepsis/Septic Shock)  Goal: Blood Glucose Level Within Desired Range  Outcome: Ongoing, Progressing     Problem: Infection Progression (Sepsis/Septic Shock)  Goal: Absence of Infection Signs and Symptoms  Outcome: Ongoing, Progressing     Problem: Nutrition Impaired (Sepsis/Septic Shock)  Goal: Optimal Nutrition Intake  Outcome: Ongoing, Progressing

## 2023-04-29 NOTE — ASSESSMENT & PLAN NOTE
Patient has recurrent depression which is mild and is currently controlled. Will Continue anti-depressant medications. We will not consult psychiatry at this time. Patient does not display psychosis at this time. Continue to monitor closely and adjust plan of care as needed.

## 2023-04-29 NOTE — ASSESSMENT & PLAN NOTE
-presents with septic shock presumably due to multifocal pneumonia; also likely component of adrenal insufficiency  -mild encephalopathy, lethargy; now resolved  -fluid resuscitated in the ED without appropriate response   -initiated on Levophed, weaned off today  -blood cultures collected in the ED   -initial lactic acid 2.6; has improved upon reassessment  -initiate on broad-spectrum antibiotics; she is grown ESBL and MSSA in the past  -transfer to ICU; may be able to step-down later if sustains off pressors

## 2023-04-29 NOTE — NURSING
Patient transferred  to room 464 from the ICU, report received from Zaida ICU RN. Vitals stable. Daughters at the bed side. Safety maintained, bed in lowest position, bed alarm on, bed wheels locked, call light with in reach. Will continue to monitor.

## 2023-04-29 NOTE — PROGRESS NOTES
"[facility] Pulmonary/Critical Care Fellow Progress Note:    Brief Hx: 67 y.o.  female with a PMHx of anxiety & depression, CAD s/p ACS (2016), ischemic CM, CHF, chronic LE edema, chronic low back pain s/p indwelling opioid pain pump, suprapubic catheter, hypothyroidism, CECI on CPAP, chronic respiratory failure on home oxygen (3-5L) admitted for acute hypoxemic respiratory failure likely secondary to bacteria vs viral PNA. She was initially pressor-requiring but her shock has resolved.     Subjective:  Patient feels that she is not better than when she came in yet, but she is more awake and alert this AM. Still has cough that is productive and whole anterior chest wall pain. She feels short-winded despite decrease in O2 requirements and has asked nursing staff to increase O2.     Objective:  Last 24 Hour Vital Signs:  BP  Min: 76/36  Max: 159/67  Temp  Av.9 °F (37.2 °C)  Min: 98.2 °F (36.8 °C)  Max: 99.3 °F (37.4 °C)  Pulse  Av.1  Min: 54  Max: 86  Resp  Av.7  Min: 14  Max: 43  SpO2  Av.9 %  Min: 81 %  Max: 98 %  Height  Av' 5" (165.1 cm)  Min: 5' 5" (165.1 cm)  Max: 5' 5" (165.1 cm)  Weight  Av.2 kg (232 lb 0.5 oz)  Min: 105 kg (231 lb 7.7 oz)  Max: 105.5 kg (232 lb 9.4 oz)  Body mass index is 38.7 kg/m².  I/O last 3 completed shifts:  In: 2672.1 [P.O.:832; I.V.:212.6; IV Piggyback:1627.4]  Out: 3350 [Urine:3350]      Physical Exam:  General: Alert and awake in NAD  HENT:  NCAT; anicteric sclera; OP clear with MMM  Cardio:  Regular rate and rhythm with normal S1 and S2; no murmurs or rubs  Resp:  CTAB; respirations unlabored; no wheezes, crackles or rhonchi  Abdom:  Soft, non-distended  Extrem:  WWP with no clubbing, cyanosis, (+)lymphedema b/l  Pulses:  2+ and symmetric distally  Neuro:  AAOx3; cooperative and pleasant with no focal deficits      Assessment & Plan:     Tasha Hawley is a 67 y.o.  female with a PMHx of anxiety & depression, CAD s/p ACS (2016), ischemic CM, " CHF, chronic LE edema, chronic low back pain s/p indwelling opioid pain pump, suprapubic catheter, hypothyroidism, CECI on CPAP, chronic respiratory failure on home oxygen (3-5L) admitted for acute hypoxemic respiratory failure. She has been weaned back down to her home oxygen in the ICU.      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)  - CECI: CPAP at night & with naps     GI  - hx of constipation 2/2 chronic opioid use - scheduled bowel regimen with miralax and senna  - continue home protonix     Renal  - chronic metabolic alkalosis - likely secondary to incomplete adherence to CPAP for CECI  - monitor daily BMP     Heme  - chronic normocytic anemia - outpt follow up, continue home B12     ID  - blood cultures x2 in process  - respiratory culture ordered, not collected yet  - legionella and strep PNA ag in process  - flu/COVID negative, procal normal  - respiratory viral panel  - hx of ESBL E.Coli - on meropenem  - started on doxycycline given concern for multifocal PNA (atypical PNA coverage) and MRSA coverage  - MRSA nares ordered     Endocrine  - continue home synthroid, TSH lab pending  - reported hx of adrenal insufficiency dx by inadequate response to cosyntropin stimulation test - appears to be on 10mg outpatient hydrocortisone, continue stress dose steroids given shock and wean as above     F: PO diet  A: intra-thecal pain pump  S: trazodone, seroquel  T: lovenox  H: elevated  U:  protonix  G: no insulin, monitor on stress dose steroids  S: n/a (SAT/SBT)     B: miralax, senna   I: suprapubic cath  D: meropenem, doxycycline- pending cultures & MRSA nares, can de-escalate    Josefa Campos MD  Pulm/CC Fellow

## 2023-04-29 NOTE — NURSING TRANSFER
Nursing Transfer Note      4/29/2023     Reason patient is being transferred: Stepdown from ICU    Transfer To: Room 464    Transfer via bed    Transfer with 5L O2 per HFNC, cardiac monitoring / CPAP machine    Transported by this RN & transporter    Medicines sent: Yes    Any special needs or follow-up needed: No    Chart send with patient: Yes    Notified: Daughter at bedside upon transfer    Upon arrival to floor: cardiac monitor applied, patient oriented to room, call bell in reach, and bed in lowest position.  Bed alarm on.  Accepting nurse,Prudence, at bedside upon arrival.  Sent with all belongings.

## 2023-04-29 NOTE — NURSING
Patient C/O chest pressure and difficulty breathing.  Patient also states she has anxiety.  SpO2 92% on 5L HFNC, /56, HR 80.  Dr. Mistry made aware.  Will obtain 12-lead EKG and order received for hydroxyzine.  Will implement and continue to monitor.

## 2023-04-29 NOTE — PLAN OF CARE
Problem: Adult Inpatient Plan of Care  Goal: Plan of Care Review  Outcome: Ongoing, Progressing  Goal: Patient-Specific Goal (Individualized)  Outcome: Ongoing, Progressing  Goal: Absence of Hospital-Acquired Illness or Injury  Outcome: Ongoing, Progressing  Goal: Optimal Comfort and Wellbeing  Outcome: Ongoing, Progressing  Goal: Readiness for Transition of Care  Outcome: Ongoing, Progressing     Problem: Infection  Goal: Absence of Infection Signs and Symptoms  Outcome: Ongoing, Progressing     Problem: Adjustment to Illness (Sepsis/Septic Shock)  Goal: Optimal Coping  Outcome: Ongoing, Progressing     Problem: Bleeding (Sepsis/Septic Shock)  Goal: Absence of Bleeding  Outcome: Ongoing, Progressing     Problem: Glycemic Control Impaired (Sepsis/Septic Shock)  Goal: Blood Glucose Level Within Desired Range  Outcome: Ongoing, Progressing     Problem: Infection Progression (Sepsis/Septic Shock)  Goal: Absence of Infection Signs and Symptoms  Outcome: Ongoing, Progressing     Problem: Nutrition Impaired (Sepsis/Septic Shock)  Goal: Optimal Nutrition Intake  Outcome: Ongoing, Progressing     Problem: Fluid Imbalance (Pneumonia)  Goal: Fluid Balance  Outcome: Ongoing, Progressing     Problem: Infection (Pneumonia)  Goal: Resolution of Infection Signs and Symptoms  Outcome: Ongoing, Progressing     Problem: Respiratory Compromise (Pneumonia)  Goal: Effective Oxygenation and Ventilation  Outcome: Ongoing, Progressing     Problem: Fall Injury Risk  Goal: Absence of Fall and Fall-Related Injury  Outcome: Ongoing, Progressing     Problem: Skin Injury Risk Increased  Goal: Skin Health and Integrity  Outcome: Ongoing, Progressing     Problem: Anxiety  Goal: Anxiety Reduction or Resolution  Outcome: Ongoing, Progressing      Normal rate, regular rhythm.  Heart sounds S1, S2.  No murmurs, rubs or gallops.

## 2023-04-29 NOTE — ASSESSMENT & PLAN NOTE
Patient with Hypercapnic and Hypoxic Respiratory failure which is Acute on chronic.  she is on home oxygen at 2 LPM. Supplemental oxygen was provided and noted- Oxygen Concentration (%):  [30] 30    .   Signs/symptoms of respiratory failure include- respiratory distress and lethargy. Contributing diagnoses includes - Pneumonia Labs and images were reviewed. Patient Has recent ABG, which has been reviewed. Will treat underlying causes and adjust management of respiratory failure as follows-     -treat pneumonia  -continue home inhaler regimen  -incentive spirometry  -nightly CPAP; did not use last night

## 2023-04-29 NOTE — PROGRESS NOTES
[facility] Medicine  Progress Note    Patient Name: Tasha Hawley  MRN: [id number]  Patient Class: IP- Inpatient   Admission Date: 4/28/2023  Length of Stay: 1 days  Attending Physician: Nic Mistry, *  Primary Care Provider: Mesfin Hodges Ii, MD        Subjective:     Principal Problem:Sepsis with encephalopathy and septic shock        HPI:  Ms. Hawley is a 68yo woman with adrenal insufficiency, chronic pain on dilaudid intrathecal pump, sleep apnea, hypothyroidism, lymphedema, suprapubic catheter who presents with several days of shortness of breath.  States that over the past few days she has been having worsening shortness of breath along with productive cough.  Denies any fevers or chills, although she does have a recorded fever upon admission here.  She does use supplemental oxygen at home and usually uses CPAP device.  She also states that she has had some chest pain that started yesterday.  Worse with deep inspiration.  She notes bilateral lower extremity swelling as well.      Overview/Hospital Course:  No notes on file    Interval History:  Breathing doing a little better today appears more alert.  Off Levophed as of this morning.  Still reporting significant back pain which is chronic; requesting something IV for this.  Discussed that we will not be doing IV medicine for chronic pain, but can adjust her p.o. medications.    Review of Systems   Respiratory:  Positive for cough and shortness of breath.    Musculoskeletal:  Positive for back pain.   Objective:     Vital Signs (Most Recent):  Temp: 98.2 °F (36.8 °C) (04/29/23 0800)  Pulse: 64 (04/29/23 1115)  Resp: 16 (04/29/23 1115)  BP: (!) 117/56 (04/29/23 1100)  SpO2: (!) 92 % (04/29/23 1115)   Vital Signs (24h Range):  Temp:  [98.2 °F (36.8 °C)-99.3 °F (37.4 °C)] 98.2 °F (36.8 °C)  Pulse:  [54-86] 64  Resp:  [14-43] 16  SpO2:  [81 %-98 %] 92 %  BP: ()/(36-69) 117/56     Weight: 105.5 kg (232 lb 9.4 oz)  Body  mass index is 38.7 kg/m².    Intake/Output Summary (Last 24 hours) at 4/29/2023 1155  Last data filed at 4/29/2023 1100  Gross per 24 hour   Intake 2153.83 ml   Output 3690 ml   Net -1536.17 ml      Physical Exam  Vitals reviewed.   Constitutional:       General: She is not in acute distress.     Appearance: She is well-developed. She is ill-appearing. She is not diaphoretic.   HENT:      Head: Normocephalic and atraumatic.      Nose: Nose normal.   Eyes:      General: No scleral icterus.     Pupils: Pupils are equal, round, and reactive to light.   Neck:      Vascular: No JVD.      Trachea: No tracheal deviation.   Cardiovascular:      Rate and Rhythm: Normal rate and regular rhythm.      Heart sounds: Normal heart sounds.   Pulmonary:      Effort: Pulmonary effort is normal. No respiratory distress.      Breath sounds: Rales present.      Comments: On supplemental oxygen  Abdominal:      General: There is no distension.      Palpations: Abdomen is soft.      Tenderness: There is no abdominal tenderness.   Musculoskeletal:         General: No deformity.      Cervical back: Normal range of motion.      Right lower leg: Edema present.      Left lower leg: Edema present.   Skin:     General: Skin is warm and dry.      Findings: No rash.   Neurological:      Mental Status: She is alert and oriented to person, place, and time.   Psychiatric:         Behavior: Behavior normal.       Significant Labs: All pertinent labs within the past 24 hours have been reviewed.    Significant Imaging: I have reviewed all pertinent imaging results/findings within the past 24 hours.      Assessment/Plan:      * Sepsis with encephalopathy and septic shock  -presents with septic shock presumably due to multifocal pneumonia; also likely component of adrenal insufficiency  -mild encephalopathy, lethargy; now resolved  -fluid resuscitated in the ED without appropriate response   -initiated on Levophed, weaned off today  -blood cultures  collected in the ED   -initial lactic acid 2.6; has improved upon reassessment  -initiate on broad-spectrum antibiotics; she is grown ESBL and MSSA in the past  -transfer to ICU; may be able to step-down later if sustains off pressors      Multifocal pneumonia  -chest x-ray concerning for multifocal pneumonia   -initiated on broad-spectrum antibiotics   -respiratory sputum cultures and blood cultures pending   -see septic shock      Acute respiratory failure with hypoxia and hypercarbia  Patient with Hypercapnic and Hypoxic Respiratory failure which is Acute on chronic.  she is on home oxygen at 2 LPM. Supplemental oxygen was provided and noted- Oxygen Concentration (%):  [30] 30    .   Signs/symptoms of respiratory failure include- respiratory distress and lethargy. Contributing diagnoses includes - Pneumonia Labs and images were reviewed. Patient Has recent ABG, which has been reviewed. Will treat underlying causes and adjust management of respiratory failure as follows-     -treat pneumonia  -continue home inhaler regimen  -incentive spirometry  -nightly CPAP; did not use last night    Adrenal insufficiency  -stress dose IV hydrocortisone      History of stroke with residual deficit        Debility  -will need PT/OT once hemodynamically stable; will likely resume home health      Chronic diastolic heart failure  -BNP within normal range; does have some leg swelling but this is likely secondary to her lymphedema      Mixed stress and urge urinary incontinence  -oxybutynin      Suprapubic catheter  -in place   -UA not consistent with infection      Insomnia due to medical condition  -continue home trazodone      S/P insertion of intrathecal pump  -noted      S/P insertion of spinal cord stimulator  -noted      Lymphedema of both lower extremities  -noted   -IV Lasix given at admission, continue wrapping   -wound care      Primary hypothyroidism  -resume home thyroid medication   -TSH within normal  limits      Neurogenic bladder  -continue home oxybutynin      Coronary artery disease of native artery with stable angina pectoris  -continue home aspirin and statin      Narcotic dependency, continuous  -on intrathecal pain pump with breakthrough Norco      Chronic pain syndrome  -noted   -on intrathecal pain pump with p.r.n. hydrocodone      Major depressive disorder, recurrent, mild  Patient has recurrent depression which is mild and is currently controlled. Will Continue anti-depressant medications. We will not consult psychiatry at this time. Patient does not display psychosis at this time. Continue to monitor closely and adjust plan of care as needed.        Sleep apnea  -resume home CPAP      Generalized anxiety disorder  -continue home meds        VTE Risk Mitigation (From admission, onward)         Ordered     enoxaparin injection 40 mg  Daily         04/28/23 1115     IP VTE HIGH RISK PATIENT  Once         04/28/23 1115     Place sequential compression device  Until discontinued         04/28/23 1115                Discharge Planning   JACKIE:      Code Status: Full Code   Is the patient medically ready for discharge?:     Reason for patient still in hospital (select all that apply): Patient trending condition and Treatment               Critical care time spent on the evaluation and treatment of severe organ dysfunction, review of pertinent labs and imaging studies, discussions with consulting providers and discussions with patient/family: 32 minutes.      Nic Mistry MD  Department of Hospital Medicine   [facility] - Intensive Care

## 2023-04-29 NOTE — SUBJECTIVE & OBJECTIVE
Interval History:  Breathing doing a little better today appears more alert.  Off Levophed as of this morning.  Still reporting significant back pain which is chronic; requesting something IV for this.  Discussed that we will not be doing IV medicine for chronic pain, but can adjust her p.o. medications.    Review of Systems   Respiratory:  Positive for cough and shortness of breath.    Musculoskeletal:  Positive for back pain.   Objective:     Vital Signs (Most Recent):  Temp: 98.2 °F (36.8 °C) (04/29/23 0800)  Pulse: 64 (04/29/23 1115)  Resp: 16 (04/29/23 1115)  BP: (!) 117/56 (04/29/23 1100)  SpO2: (!) 92 % (04/29/23 1115)   Vital Signs (24h Range):  Temp:  [98.2 °F (36.8 °C)-99.3 °F (37.4 °C)] 98.2 °F (36.8 °C)  Pulse:  [54-86] 64  Resp:  [14-43] 16  SpO2:  [81 %-98 %] 92 %  BP: ()/(36-69) 117/56     Weight: 105.5 kg (232 lb 9.4 oz)  Body mass index is 38.7 kg/m².    Intake/Output Summary (Last 24 hours) at 4/29/2023 1155  Last data filed at 4/29/2023 1100  Gross per 24 hour   Intake 2153.83 ml   Output 3690 ml   Net -1536.17 ml      Physical Exam  Vitals reviewed.   Constitutional:       General: She is not in acute distress.     Appearance: She is well-developed. She is ill-appearing. She is not diaphoretic.   HENT:      Head: Normocephalic and atraumatic.      Nose: Nose normal.   Eyes:      General: No scleral icterus.     Pupils: Pupils are equal, round, and reactive to light.   Neck:      Vascular: No JVD.      Trachea: No tracheal deviation.   Cardiovascular:      Rate and Rhythm: Normal rate and regular rhythm.      Heart sounds: Normal heart sounds.   Pulmonary:      Effort: Pulmonary effort is normal. No respiratory distress.      Breath sounds: Rales present.      Comments: On supplemental oxygen  Abdominal:      General: There is no distension.      Palpations: Abdomen is soft.      Tenderness: There is no abdominal tenderness.   Musculoskeletal:         General: No deformity.      Cervical  back: Normal range of motion.      Right lower leg: Edema present.      Left lower leg: Edema present.   Skin:     General: Skin is warm and dry.      Findings: No rash.   Neurological:      Mental Status: She is alert and oriented to person, place, and time.   Psychiatric:         Behavior: Behavior normal.       Significant Labs: All pertinent labs within the past 24 hours have been reviewed.    Significant Imaging: I have reviewed all pertinent imaging results/findings within the past 24 hours.

## 2023-04-30 LAB
ANION GAP SERPL CALC-SCNC: 8 MMOL/L (ref 8–16)
AORTIC ROOT ANNULUS: 2.6 CM
AV INDEX (PROSTH): 0.73
AV MEAN GRADIENT: 8 MMHG
AV PEAK GRADIENT: 14 MMHG
AV VALVE AREA: 2.32 CM2
AV VELOCITY RATIO: 0.65
BASOPHILS # BLD AUTO: 0.02 K/UL (ref 0–0.2)
BASOPHILS NFR BLD: 0.2 % (ref 0–1.9)
BSA FOR ECHO PROCEDURE: 2.2 M2
BUN SERPL-MCNC: 10 MG/DL (ref 8–23)
CALCIUM SERPL-MCNC: 9.2 MG/DL (ref 8.7–10.5)
CHLORIDE SERPL-SCNC: 99 MMOL/L (ref 95–110)
CO2 SERPL-SCNC: 35 MMOL/L (ref 23–29)
CREAT SERPL-MCNC: 0.8 MG/DL (ref 0.5–1.4)
CV ECHO LV RWT: 0.4 CM
DIFFERENTIAL METHOD: ABNORMAL
DOP CALC AO PEAK VEL: 1.9 M/S
DOP CALC AO VTI: 44.9 CM
DOP CALC LVOT AREA: 3.2 CM2
DOP CALC LVOT DIAMETER: 2.01 CM
DOP CALC LVOT PEAK VEL: 1.23 M/S
DOP CALC LVOT STROKE VOLUME: 104.34 CM3
DOP CALC MV VTI: 31.1 CM
DOP CALCLVOT PEAK VEL VTI: 32.9 CM
E WAVE DECELERATION TIME: 198.76 MSEC
E/A RATIO: 1.09
E/E' RATIO: 12.22 M/S
ECHO LV POSTERIOR WALL: 0.91 CM (ref 0.6–1.1)
EJECTION FRACTION: 65 %
EOSINOPHIL # BLD AUTO: 0 K/UL (ref 0–0.5)
EOSINOPHIL NFR BLD: 0.3 % (ref 0–8)
ERYTHROCYTE [DISTWIDTH] IN BLOOD BY AUTOMATED COUNT: 14.5 % (ref 11.5–14.5)
EST. GFR  (NO RACE VARIABLE): >60 ML/MIN/1.73 M^2
FRACTIONAL SHORTENING: 64 % (ref 28–44)
GLUCOSE SERPL-MCNC: 111 MG/DL (ref 70–110)
HCT VFR BLD AUTO: 28.9 % (ref 37–48.5)
HGB BLD-MCNC: 8.7 G/DL (ref 12–16)
IMM GRANULOCYTES # BLD AUTO: 0.05 K/UL (ref 0–0.04)
IMM GRANULOCYTES NFR BLD AUTO: 0.4 % (ref 0–0.5)
INTERVENTRICULAR SEPTUM: 0.9 CM (ref 0.6–1.1)
IVC DIAMETER: 2.41 CM
LA MAJOR: 6.13 CM
LA MINOR: 4.44 CM
LA WIDTH: 3.8 CM
LEFT ATRIUM SIZE: 3.6 CM
LEFT ATRIUM VOLUME INDEX MOD: 29 ML/M2
LEFT ATRIUM VOLUME INDEX: 28.4 ML/M2
LEFT ATRIUM VOLUME MOD: 61.15 CM3
LEFT ATRIUM VOLUME: 59.88 CM3
LEFT INTERNAL DIMENSION IN SYSTOLE: 1.6 CM (ref 2.1–4)
LEFT VENTRICLE DIASTOLIC VOLUME INDEX: 38.64 ML/M2
LEFT VENTRICLE DIASTOLIC VOLUME: 81.54 ML
LEFT VENTRICLE MASS INDEX: 63 G/M2
LEFT VENTRICLE SYSTOLIC VOLUME INDEX: 14 ML/M2
LEFT VENTRICLE SYSTOLIC VOLUME: 29.47 ML
LEFT VENTRICULAR INTERNAL DIMENSION IN DIASTOLE: 4.5 CM (ref 3.5–6)
LEFT VENTRICULAR MASS: 133.82 G
LV LATERAL E/E' RATIO: 11 M/S
LV SEPTAL E/E' RATIO: 13.75 M/S
LVOT MG: 2.61 MMHG
LVOT MV: 0.73 CM/S
LYMPHOCYTES # BLD AUTO: 0.9 K/UL (ref 1–4.8)
LYMPHOCYTES NFR BLD: 7.9 % (ref 18–48)
MAGNESIUM SERPL-MCNC: 2.1 MG/DL (ref 1.6–2.6)
MCH RBC QN AUTO: 26 PG (ref 27–31)
MCHC RBC AUTO-ENTMCNC: 30.1 G/DL (ref 32–36)
MCV RBC AUTO: 86 FL (ref 82–98)
MONOCYTES # BLD AUTO: 0.8 K/UL (ref 0.3–1)
MONOCYTES NFR BLD: 6.6 % (ref 4–15)
MRSA SPEC QL CULT: NORMAL
MV A" WAVE DURATION": 148.43 MSEC
MV MEAN GRADIENT: 3 MMHG
MV PEAK A VEL: 1.01 M/S
MV PEAK E VEL: 1.1 M/S
MV PEAK GRADIENT: 5 MMHG
MV STENOSIS PRESSURE HALF TIME: 57.64 MS
MV VALVE AREA BY CONTINUITY EQUATION: 3.36 CM2
MV VALVE AREA P 1/2 METHOD: 3.82 CM2
NEUTROPHILS # BLD AUTO: 9.8 K/UL (ref 1.8–7.7)
NEUTROPHILS NFR BLD: 84.6 % (ref 38–73)
NRBC BLD-RTO: 0 /100 WBC
OHS LV EJECTION FRACTION SIMPSONS BIPLANE MOD: 6 %
PISA TR MAX VEL: 3.13 M/S
PLATELET # BLD AUTO: 240 K/UL (ref 150–450)
PMV BLD AUTO: 9.8 FL (ref 9.2–12.9)
POTASSIUM SERPL-SCNC: 3.8 MMOL/L (ref 3.5–5.1)
PV MV: 0.83 M/S
PV PEAK VELOCITY: 1.43 CM/S
RA MAJOR: 5.18 CM
RA PRESSURE: 3 MMHG
RA WIDTH: 3.4 CM
RBC # BLD AUTO: 3.35 M/UL (ref 4–5.4)
RIGHT VENTRICULAR END-DIASTOLIC DIMENSION: 2.75 CM
RV TISSUE DOPPLER FREE WALL SYSTOLIC VELOCITY 1 (APICAL 4 CHAMBER VIEW): 0.02 CM/S
SODIUM SERPL-SCNC: 142 MMOL/L (ref 136–145)
TDI LATERAL: 0.1 M/S
TDI SEPTAL: 0.08 M/S
TDI: 0.09 M/S
TR MAX PG: 39 MMHG
TV REST PULMONARY ARTERY PRESSURE: 42 MMHG
WBC # BLD AUTO: 11.64 K/UL (ref 3.9–12.7)

## 2023-04-30 PROCEDURE — 94640 AIRWAY INHALATION TREATMENT: CPT | Mod: HCNC

## 2023-04-30 PROCEDURE — 83735 ASSAY OF MAGNESIUM: CPT | Mod: HCNC | Performed by: HOSPITALIST

## 2023-04-30 PROCEDURE — 36415 COLL VENOUS BLD VENIPUNCTURE: CPT | Mod: HCNC | Performed by: HOSPITALIST

## 2023-04-30 PROCEDURE — 80048 BASIC METABOLIC PNL TOTAL CA: CPT | Mod: HCNC | Performed by: HOSPITALIST

## 2023-04-30 PROCEDURE — 94761 N-INVAS EAR/PLS OXIMETRY MLT: CPT | Mod: HCNC

## 2023-04-30 PROCEDURE — 85025 COMPLETE CBC W/AUTO DIFF WBC: CPT | Mod: HCNC | Performed by: HOSPITALIST

## 2023-04-30 PROCEDURE — 25000003 PHARM REV CODE 250: Mod: HCNC | Performed by: HOSPITALIST

## 2023-04-30 PROCEDURE — 97535 SELF CARE MNGMENT TRAINING: CPT | Mod: HCNC

## 2023-04-30 PROCEDURE — 99900035 HC TECH TIME PER 15 MIN (STAT): Mod: HCNC

## 2023-04-30 PROCEDURE — 94799 UNLISTED PULMONARY SVC/PX: CPT | Mod: HCNC

## 2023-04-30 PROCEDURE — 11000001 HC ACUTE MED/SURG PRIVATE ROOM: Mod: HCNC

## 2023-04-30 PROCEDURE — 27000221 HC OXYGEN, UP TO 24 HOURS: Mod: HCNC

## 2023-04-30 PROCEDURE — A4216 STERILE WATER/SALINE, 10 ML: HCPCS | Mod: HCNC | Performed by: HOSPITALIST

## 2023-04-30 PROCEDURE — 97116 GAIT TRAINING THERAPY: CPT | Mod: HCNC

## 2023-04-30 PROCEDURE — 97162 PT EVAL MOD COMPLEX 30 MIN: CPT | Mod: HCNC

## 2023-04-30 PROCEDURE — 63600175 PHARM REV CODE 636 W HCPCS: Mod: HCNC | Performed by: HOSPITALIST

## 2023-04-30 PROCEDURE — 63600175 PHARM REV CODE 636 W HCPCS: Mod: HCNC | Performed by: STUDENT IN AN ORGANIZED HEALTH CARE EDUCATION/TRAINING PROGRAM

## 2023-04-30 PROCEDURE — 97165 OT EVAL LOW COMPLEX 30 MIN: CPT | Mod: HCNC

## 2023-04-30 RX ORDER — HYDROXYZINE HYDROCHLORIDE 25 MG/1
50 TABLET, FILM COATED ORAL EVERY 4 HOURS PRN
Status: DISCONTINUED | OUTPATIENT
Start: 2023-04-30 | End: 2023-05-05 | Stop reason: HOSPADM

## 2023-04-30 RX ORDER — LORAZEPAM 0.5 MG/1
0.5 TABLET ORAL EVERY 12 HOURS PRN
Status: DISCONTINUED | OUTPATIENT
Start: 2023-04-30 | End: 2023-04-30

## 2023-04-30 RX ORDER — LORAZEPAM 0.5 MG/1
0.5 TABLET ORAL
Status: DISCONTINUED | OUTPATIENT
Start: 2023-04-30 | End: 2023-05-05 | Stop reason: HOSPADM

## 2023-04-30 RX ORDER — FUROSEMIDE 10 MG/ML
40 INJECTION INTRAMUSCULAR; INTRAVENOUS DAILY
Status: DISCONTINUED | OUTPATIENT
Start: 2023-04-30 | End: 2023-05-05 | Stop reason: HOSPADM

## 2023-04-30 RX ADMIN — HYDROCODONE BITARTRATE AND ACETAMINOPHEN 1 TABLET: 10; 325 TABLET ORAL at 09:04

## 2023-04-30 RX ADMIN — Medication 10 ML: at 01:04

## 2023-04-30 RX ADMIN — OXYBUTYNIN CHLORIDE 10 MG: 5 TABLET, EXTENDED RELEASE ORAL at 10:04

## 2023-04-30 RX ADMIN — HYDROXYZINE HYDROCHLORIDE 50 MG: 25 TABLET, FILM COATED ORAL at 08:04

## 2023-04-30 RX ADMIN — LIDOCAINE 1 PATCH: 50 PATCH CUTANEOUS at 10:04

## 2023-04-30 RX ADMIN — LEVOTHYROXINE SODIUM 150 MCG: 75 TABLET ORAL at 06:04

## 2023-04-30 RX ADMIN — PANTOPRAZOLE SODIUM 40 MG: 40 TABLET, DELAYED RELEASE ORAL at 10:04

## 2023-04-30 RX ADMIN — DOXYCYCLINE 100 MG: 100 INJECTION, POWDER, LYOPHILIZED, FOR SOLUTION INTRAVENOUS at 04:04

## 2023-04-30 RX ADMIN — FUROSEMIDE 40 MG: 10 INJECTION, SOLUTION INTRAMUSCULAR; INTRAVENOUS at 10:04

## 2023-04-30 RX ADMIN — HYDROCORTISONE SODIUM SUCCINATE 50 MG: 250 INJECTION, POWDER, FOR SOLUTION INTRAMUSCULAR; INTRAVENOUS at 10:04

## 2023-04-30 RX ADMIN — MUPIROCIN: 20 OINTMENT TOPICAL at 10:04

## 2023-04-30 RX ADMIN — STANDARDIZED SENNA CONCENTRATE 2 TABLET: 8.6 TABLET ORAL at 10:04

## 2023-04-30 RX ADMIN — ENOXAPARIN SODIUM 40 MG: 40 INJECTION SUBCUTANEOUS at 04:04

## 2023-04-30 RX ADMIN — MEROPENEM 1 G: 1 INJECTION, POWDER, FOR SOLUTION INTRAVENOUS at 11:04

## 2023-04-30 RX ADMIN — HYDROCODONE BITARTRATE AND ACETAMINOPHEN 1 TABLET: 10; 325 TABLET ORAL at 03:04

## 2023-04-30 RX ADMIN — QUETIAPINE FUMARATE 200 MG: 100 TABLET ORAL at 08:04

## 2023-04-30 RX ADMIN — MEROPENEM 1 G: 1 INJECTION, POWDER, FOR SOLUTION INTRAVENOUS at 02:04

## 2023-04-30 RX ADMIN — FLUDROCORTISONE ACETATE 100 MCG: 0.1 TABLET ORAL at 10:04

## 2023-04-30 RX ADMIN — LORAZEPAM 0.5 MG: 0.5 TABLET ORAL at 09:04

## 2023-04-30 RX ADMIN — FLUOXETINE HYDROCHLORIDE 60 MG: 20 CAPSULE ORAL at 10:04

## 2023-04-30 RX ADMIN — HYDROCORTISONE SODIUM SUCCINATE 50 MG: 250 INJECTION, POWDER, FOR SOLUTION INTRAMUSCULAR; INTRAVENOUS at 02:04

## 2023-04-30 RX ADMIN — MEROPENEM 1 G: 1 INJECTION, POWDER, FOR SOLUTION INTRAVENOUS at 08:04

## 2023-04-30 RX ADMIN — ASPIRIN 81 MG: 81 TABLET, COATED ORAL at 10:04

## 2023-04-30 RX ADMIN — TIOTROPIUM BROMIDE INHALATION SPRAY 2 PUFF: 3.12 SPRAY, METERED RESPIRATORY (INHALATION) at 11:04

## 2023-04-30 RX ADMIN — Medication 10 ML: at 06:04

## 2023-04-30 RX ADMIN — HYDROCODONE BITARTRATE AND ACETAMINOPHEN 1 TABLET: 10; 325 TABLET ORAL at 08:04

## 2023-04-30 RX ADMIN — CYANOCOBALAMIN TAB 1000 MCG 1000 MCG: 1000 TAB at 10:04

## 2023-04-30 RX ADMIN — TRAZODONE HYDROCHLORIDE 300 MG: 100 TABLET ORAL at 08:04

## 2023-04-30 RX ADMIN — HYDROCORTISONE SODIUM SUCCINATE 50 MG: 250 INJECTION, POWDER, FOR SOLUTION INTRAMUSCULAR; INTRAVENOUS at 06:04

## 2023-04-30 RX ADMIN — DOXYCYCLINE 100 MG: 100 INJECTION, POWDER, LYOPHILIZED, FOR SOLUTION INTRAVENOUS at 03:04

## 2023-04-30 RX ADMIN — FLUTICASONE PROPIONATE 100 MCG: 50 SPRAY, METERED NASAL at 10:04

## 2023-04-30 RX ADMIN — ATORVASTATIN CALCIUM 80 MG: 40 TABLET, FILM COATED ORAL at 10:04

## 2023-04-30 RX ADMIN — MUPIROCIN: 20 OINTMENT TOPICAL at 08:04

## 2023-04-30 RX ADMIN — POTASSIUM CHLORIDE 20 MEQ: 1500 TABLET, EXTENDED RELEASE ORAL at 10:04

## 2023-04-30 RX ADMIN — POLYETHYLENE GLYCOL 3350 17 G: 17 POWDER, FOR SOLUTION ORAL at 10:04

## 2023-04-30 NOTE — PLAN OF CARE
SW met with pt via [employer] to discuss dc planning. Pt provided SW permission to contact her daughter Jaycee to complete assessment. SW contacted Jaycee to complete assessment. Jaycee reported that pt is her step mother. Jaycee expressed that she is involved in pts care. Pt resides in home with her . Pt is provided assistance by her  to complete certain ADLs. Per Jaycee pts  is physically able to assist pt in care needs. Pt has CPAP, Wheelchair, Rollator, concentrator, and portable O2 tanks. Pts daughter was made aware that they must bring portable O2 tank to hospital upon dc. Pt is current with [facility]. SW discussed PT/OT recommendation of SNF. Pts daughter reports that pt may decline SNF placement due to past bad experience going to SNF at [facility]. SW expressed understanding. Upon dc pts  will provide transport home. SW will continue to follow pt throughout her transitions of care and assist with any dc needs.     Jaycee Hawley (Daughter)   [phone]     SW sent referral to [facility]  agency.     Future Appointments   Date Time Provider Department Center   5/4/2023 11:00 AM Norma Pearson MD [facility] ENDODIA [facility]   5/9/2023  1:40 PM Brent Ceballos MD [facility] CARDIO [place]   5/12/2023  1:00 PM Cami Romeor MD [facility] PULMSVC [facility]   5/17/2023  4:00 PM Prince Vance MD [facility] GASTRO [facility]   7/7/2023 11:00 AM Mesfin Hodges II, MD [facility] IM [facility] PCW        04/30/23 1406   Discharge Assessment   Assessment Type Discharge Planning Assessment   Confirmed/corrected address, phone number and insurance Yes   Confirmed Demographics Correct on Facesheet   Source of Information patient;family   Communicated JACKIE with patient/caregiver Yes   Reason For Admission Sepsis with encephalopathy and septic shock   People in Home spouse   Do you expect to return to your current living situation? Yes   Do you have help at home or someone to help you manage your care  at home? Yes   Who are your caregiver(s) and their phone number(s)? Jaycee Hawley (Daughter)   [phone] and    Prior to hospitilization cognitive status: Unable to Assess   Current cognitive status: Unable to Assess   Home Layout Able to live on 1st floor   Equipment Currently Used at Home rollator;wheelchair;CPAP;oxygen   Patient currently being followed by outpatient case management? No   Do you currently have service(s) that help you manage your care at home? No   Do you take prescription medications? Yes   Do you have prescription coverage? Yes   Coverage [employer] MANAGED MEDICARE - [employer] HMO PPO SPECIAL NEEDS   Do you have any problems affording any of your prescribed medications? TBD   Is the patient taking medications as prescribed? no   Who is going to help you get home at discharge?    How do you get to doctors appointments? family or friend will provide   Are you on dialysis? No   Do you take coumadin? No   Discharge Plan A Home with family;Home Health   Discharge Plan B Skilled Nursing Facility   Discharge Plan discussed with: Adult children   Discharge Barriers Identified None   Social Connections   Are you , , , , never , or living with a partner?    Alcohol Use   Q1: How often do you have a drink containing alcohol? Never   Q2: How many drinks containing alcohol do you have on a typical day when you are drinking? None   Q3: How often do you have six or more drinks on one occasion? Never   OTHER   Name(s) of People in Home

## 2023-04-30 NOTE — PT/OT/SLP EVAL
"Physical Therapy Evaluation    Patient Name:  Tasha Hawley   MRN:  [id number]    Recommendations:     Discharge Recommendations: nursing facility, skilled   Discharge Equipment Recommendations: none   Barriers to discharge: Decreased caregiver support and recent history of falls at home    Assessment:     Tasha Hawley is a 67 y.o. female admitted with a medical diagnosis of Sepsis with encephalopathy and septic shock.  She presents with the following impairments/functional limitations: weakness, impaired functional mobility, impaired endurance, gait instability, pain, impaired sensation, impaired balance, decreased upper extremity function, impaired self care skills, decreased lower extremity function, decreased ROM, edema     Patient seen for physical therapy evaluation on this date, co-evaluation with occupational therapy.  Patient is agreeable to therapy, stating her back pain limits her.  Full report to follow.  Patient with history of 3 falls in last two months.  She performs bed mobility at [facility], transfers at [facility], and gait with RW x 40' with Min A, posture is with max trunk flexion and leans towards left, very slow pace, unsteady.  Anticipate patient will benefit from rehabilitation in skilled nursing facility to address limitations.  No DME needs should she return home..    Rehab Prognosis: Fair; patient would benefit from acute skilled PT services to address these deficits and reach maximum level of function.    Recent Surgery: * No surgery found *      Plan:     During this hospitalization, patient to be seen 5 x/week to address the identified rehab impairments via gait training, therapeutic activities, therapeutic exercises, neuromuscular re-education and progress toward the following goals:    Plan of Care Expires:  05/30/23    Subjective     Chief Complaint: "I want to get OOB - my back hurts"  Patient/Family Comments/goals: To return home   Pain/Comfort:  Pain Rating 1: 9/10  Location - " Orientation 1: lower  Location 1: back  Pain Addressed 1: Reposition, Distraction, Cessation of Activity, Nurse notified  Pain Rating Post-Intervention 1: 8/10    Patients cultural, spiritual, [employer] conflicts given the current situation: no    Living Environment:  Patient lives with spouse in a SSH, ramp to enter, T/S with bath bench in bathroom.  Prior to admission, patients level of function was Patient is modified independent with Rollator for gait in home, Spouse assists with ADLs.  Patient sits in recliner during the day.  Equipment used at home: bedside commode, walker, rolling, wheelchair, bath bench, oxygen, rollator.  DME owned (not currently used): none.  Upon discharge, patient will have assistance from spouse who cannot provide physical assist.    Objective:     Communicated with nurse prior to session.  Patient found supine with bed alarm, oxygen, telemetry, peripheral IV (suprapubic  catheter)  upon PT entry to room.    General Precautions: Standard, fall  Orthopedic Precautions:N/A   Braces: N/A (B Unna Boots donned)  Respiratory Status: Nasal cannula, flow 5 L/min    Exams:  Cognitive Exam:  Patient is oriented to Person, Place, Time, Situation, and follows commands  Gross Motor Coordination:  limited by trunk/back ROM limitations and pain  Postural Exam:  Patient presented with the following abnormalities:    -       Rounded shoulders  -       Forward head  -       Abnormal trunk flexion  -       Leans to left with standing and gait  Sensation:    -       Impaired  light/touch absent B Feet, though wrapped in Unna Boots, so full sensory evaluation not completed.  Patient states she has numbness in B feet  Skin Integrity/Edema:      -       Skin integrity: B Unna Boots donned  -       Edema: Moderate B LEs  RLE ROM: Deficits: decreased 25% throughout  RLE Strength: Deficits: 3 to 3+/5 grossly  LLE ROM: Deficits: decreased 25% throughout  LLE Strength: Deficits: 3 to 3+/5    Functional  Mobility:  Bed Mobility:     Rolling Right: contact guard assistance  Scooting: contact guard assistance  Supine to Sit: contact guard assistance  Transfers:     Sit to Stand:  contact guard assistance with rolling walker  Bedside Commode transfer;  Min A via stand pivot   Gait: with min assist with RW x 40' - max trunk flexion (80-90 degrees) and leans to left, slow pace  Balance: Seated:  SBA EOB    Standing:  requires RW, min Assist, very unsteady      AM-PAC 6 CLICK MOBILITY  Total Score:16       Treatment & Education:  Patient educated on role of physical therapy and POC.  Patient educated on importance of OOB.  Patient agreeable to therapy.  Patient states she has had 3 falls in last few months.  Recommending discharge to SNF to get stronger prior to returning home.      Patient left  sitting on bedside commode, pharmacist present  with all lines intact, call button in reach, and nurse notified.    GOALS:   Multidisciplinary Problems       Physical Therapy Goals          Problem: Physical Therapy    Goal Priority Disciplines Outcome Goal Variances Interventions   Physical Therapy Goal     PT, PT/OT Ongoing, Progressing     Description: Goals to be met by: 2023     Patient will increase functional independence with mobility by performin. Supine to sit with Stand-by Assistance  2. Sit to supine with Stand-by Assistance  3. Rolling to Left and Right with Stand-by Assistance.  4. Sit to stand transfer with Stand-by Assistance  5. Bed to chair transfer with Stand-by Assistance using Rolling Walker  6. Gait  x 100 feet with Supervision using Rolling Walker.                          History:     Past Medical History:   Diagnosis Date    Anticoagulant long-term use     Anxiety     Arthritis     Bilateral lower extremity edema     severe chronic    Carotid artery occlusion     Cataract     CHF (congestive heart failure)     Coronary artery disease     subtotalled LAD with collateral    Depression     Fever  blister     Hard of hearing     Hypokalemia 1/9/2023    Hyponatremia 2/4/2022    Hypothyroid     Iron deficiency anemia     Lumbar radiculopathy     with chronic pain    Ocular migraine     Renal disorder     Sleep apnea     cpap       Past Surgical History:   Procedure Laterality Date    ADENOIDECTOMY      BACK SURGERY      x 12    CARDIAC CATHETERIZATION  2016    subtotalled LAD with right to left collaterals    CATARACT EXTRACTION W/  INTRAOCULAR LENS IMPLANT Left     Dr Coleman     CYSTOSCOPIC LITHOLAPAXY N/A 6/27/2019    Procedure: CYSTOLITHOLAPAXY;  Surgeon: Shireen Mayo MD;  Location: [facility] OR 2ND FLR;  Service: Urology;  Laterality: N/A;    CYSTOSCOPIC LITHOLAPAXY N/A 9/3/2019    Procedure: CYSTOLITHOLAPAXY;  Surgeon: Shireen Mayo MD;  Location: [facility] OR 2ND FLR;  Service: Urology;  Laterality: N/A;    CYSTOSCOPY N/A 7/13/2021    Procedure: CYSTOSCOPY;  Surgeon: Shireen Mayo MD;  Location: [facility] OR 1ST FLR;  Service: Urology;  Laterality: N/A;    CYSTOSCOPY  11/16/2021    Procedure: CYSTOSCOPY;  Surgeon: Shireen Mayo MD;  Location: [facility] OR 1ST FLR;  Service: Urology;;    CYSTOSCOPY  7/19/2022    Procedure: CYSTOSCOPY;  Surgeon: Shireen Mayo MD;  Location: [facility] OR 1ST FLR;  Service: Urology;;    CYSTOSCOPY WITH INJECTION OF PERIURETHRAL BULKING AGENT  7/19/2022    Procedure: CYSTOSCOPY, WITH PERIURETHRAL BULKING AGENT INJECTION-MACROPLASTIQUE;  Surgeon: Shireen Mayo MD;  Location: [facility] OR 1ST FLR;  Service: Urology;;    CYSTOSCOPY WITH INJECTION OF PERIURETHRAL BULKING AGENT N/A 3/28/2023    Procedure: CYSTOSCOPY, WITH PERIURETHRAL BULKING AGENT INJECTION;  Surgeon: Shireen Mayo MD;  Location: [facility] OR 1ST FLR;  Service: Urology;  Laterality: N/A;  [place]    CYSTOSCOPY,WITH BOTULINUM TOXIN INJECTION N/A 12/13/2022    Procedure: CYSTOSCOPY,WITH BOTULINUM TOXIN INJECTION;  Surgeon: Shireen Mayo MD;  Location: [facility] OR 1ST FLR;  Service: Urology;  Laterality: N/A;  300 U     CYSTOSCOPY,WITH BOTULINUM TOXIN INJECTION N/A 3/28/2023    Procedure: CYSTOSCOPY,WITH BOTULINUM TOXIN INJECTION;  Surgeon: Shireen Mayo MD;  [facility];  Service: Urology;  Laterality: N/A;  45 MIN.    300 UNITS    ESOPHAGOGASTRODUODENOSCOPY N/A 5/23/2018    Procedure: ESOPHAGOGASTRODUODENOSCOPY (EGD);  Surgeon: Prince Vance MD;  Location: [facility] (4TH FLR);  Service: Endoscopy;  Laterality: N/A;  r/s 'd per Dr. Vance due to family emergency- ER    HYSTERECTOMY  1975    endometriosis    INJECTION OF BOTULINUM TOXIN TYPE A  7/13/2021    Procedure: INJECTION, BOTULINUM TOXIN, 200units;  Surgeon: Shireen Mayo MD;  [facility];  Service: Urology;;    INJECTION OF BOTULINUM TOXIN TYPE A  11/16/2021    Procedure: INJECTION, BOTULINUM TOXIN, 200units;  Surgeon: Shireen Mayo MD;  [facility];  Service: Urology;;    INJECTION OF BOTULINUM TOXIN TYPE A  7/19/2022    Procedure: INJECTION, BOTULINUM TOXIN, 300 units ;  Surgeon: Shireen Mayo MD;  [facility];  Service: Urology;;    INSERTION, SUPRAPUBIC CATHETER N/A 12/13/2022    Procedure: INSERTION, SUPRAPUBIC CATHETER;  Surgeon: Shireen Mayo MD;  Location: [facility] OR [address] [place] [address];  Service: Urology;  Laterality: N/A;  exchange    pain pump placement      SQ Dilaudid Pump managed by Dr. Hillman, Pain Management    REMOVAL OF BONE SPUR OF FOOT Bilateral 9/16/2022    Procedure: EXCISION ARTHRITIC BONE, BILATERAL FOOT;  Surgeon: Adam Mcguire DPM;  Location: [facility] OR;  Service: Podiatry;  Laterality: Bilateral;    REPLACEMENT OF CATHETER N/A 10/31/2019    Procedure: REPLACEMENT, CATHETER-SUPRAPUBIC;  Surgeon: Shireen Mayo MD;  Location: [facility] OR [address] [place] [address];  Service: Urology;  Laterality: N/A;    SPINAL CORD STIMULATOR REMOVAL      before [place]    SPINE SURGERY  5-13-13    CERVICAL FUSION    TONSILLECTOMY         Time Tracking:     PT Received On: 04/30/23  PT Start Time: 1014     PT  Stop Time: 1042  PT Total Time (min): 28 min     Billable Minutes: Evaluation 15 and Gait Training 13      04/30/2023

## 2023-04-30 NOTE — ASSESSMENT & PLAN NOTE
Patient with Hypercapnic and Hypoxic Respiratory failure which is Acute on chronic.  she is on home oxygen at 2 LPM. Supplemental oxygen was provided and noted- Oxygen Concentration (%):  [30] 30    .   Signs/symptoms of respiratory failure include- respiratory distress and lethargy. Contributing diagnoses includes - Pneumonia Labs and images were reviewed. Patient Has recent ABG, which has been reviewed. Will treat underlying causes and adjust management of respiratory failure as follows-     -treat pneumonia  -continue home inhaler regimen  -incentive spirometry  -nightly CPAP  -IV lasix daily to maintain euvolemia

## 2023-04-30 NOTE — PLAN OF CARE
The proper method of use, as well as anticipated side effects, of this metered-dose inhaler are discussed and demonstrated to the patient. Patient on oxygen with documented flow.  Will attempt to wean per O2 order protocol. Lung expansion therapy. Will continue to monitor.

## 2023-04-30 NOTE — PROGRESS NOTES
[facility]  Progress Note    Patient Name: Tasha Hawley  MRN: [id number]  Patient Class: IP- Inpatient   Admission Date: 4/28/2023  Length of Stay: 2 days  Attending Physician: Nic Mistry, *  Primary Care Provider: Mesfin Hodges Ii, MD        Subjective:     Principal Problem:Sepsis with encephalopathy and septic shock        HPI:  Ms. Hawley is a 68yo woman with adrenal insufficiency, chronic pain on dilaudid intrathecal pump, sleep apnea, hypothyroidism, lymphedema, suprapubic catheter who presents with several days of shortness of breath.  States that over the past few days she has been having worsening shortness of breath along with productive cough.  Denies any fevers or chills, although she does have a recorded fever upon admission here.  She does use supplemental oxygen at home and usually uses CPAP device.  She also states that she has had some chest pain that started yesterday.  Worse with deep inspiration.  She notes bilateral lower extremity swelling as well.      Overview/Hospital Course:  No notes on file    Interval History:  Still feeling short of breath and anxious, asking for benzo to help with anxiety. Legs still feel tight. She is sitting up watching TV, appears relatively comfortable. Repeat CXR with some improvement.    Review of Systems   Respiratory:  Positive for cough and shortness of breath.    Musculoskeletal:  Positive for back pain.   Objective:     Vital Signs (Most Recent):  Temp: 96.8 °F (36 °C) (04/30/23 0634)  Pulse: 74 (04/30/23 0634)  Resp: 18 (04/30/23 0938)  BP: (!) 115/57 (04/30/23 0634)  SpO2: (!) 94 % (04/30/23 0634)   Vital Signs (24h Range):  Temp:  [96.8 °F (36 °C)-98.1 °F (36.7 °C)] 96.8 °F (36 °C)  Pulse:  [61-85] 74  Resp:  [16-31] 18  SpO2:  [81 %-95 %] 94 %  BP: ()/(49-64) 115/57     Weight: 105.2 kg (232 lb)  Body mass index is 38.61 kg/m².    Intake/Output Summary (Last 24 hours) at 4/30/2023 0924  Last data filed  at 4/30/2023 0632  Gross per 24 hour   Intake 1379.44 ml   Output 2795 ml   Net -1415.56 ml        Physical Exam  Vitals reviewed.   Constitutional:       General: She is not in acute distress.     Appearance: She is well-developed. She is ill-appearing. She is not diaphoretic.   HENT:      Head: Normocephalic and atraumatic.      Nose: Nose normal.   Eyes:      General: No scleral icterus.     Pupils: Pupils are equal, round, and reactive to light.   Neck:      Vascular: No JVD.      Trachea: No tracheal deviation.   Cardiovascular:      Rate and Rhythm: Normal rate and regular rhythm.      Heart sounds: Normal heart sounds.   Pulmonary:      Effort: Pulmonary effort is normal. No respiratory distress.      Breath sounds: Rales present.      Comments: On supplemental oxygen  Abdominal:      General: There is no distension.      Palpations: Abdomen is soft.      Tenderness: There is no abdominal tenderness.   Musculoskeletal:         General: No deformity.      Cervical back: Normal range of motion.      Right lower leg: Edema present.      Left lower leg: Edema present.   Skin:     General: Skin is warm and dry.      Findings: No rash.   Neurological:      Mental Status: She is alert and oriented to person, place, and time.   Psychiatric:         Behavior: Behavior normal.       Significant Labs: All pertinent labs within the past 24 hours have been reviewed.    Significant Imaging: I have reviewed all pertinent imaging results/findings within the past 24 hours.      Assessment/Plan:      * Sepsis with encephalopathy and septic shock  -presents with septic shock presumably due to multifocal pneumonia; also likely component of adrenal insufficiency  -mild encephalopathy, lethargy; now resolved  -fluid resuscitated in the ED without appropriate response   -initiated on Levophed, weaned off today  -blood cultures collected in the ED   -initial lactic acid 2.6; has improved upon reassessment  -initiate on  broad-spectrum antibiotics; she has grown ESBL and MSSA in the past  -stepped out of ICU 4/29      Multifocal pneumonia  -chest x-ray concerning for multifocal pneumonia   -initiated on broad-spectrum antibiotics   -respiratory sputum cultures and blood cultures pending; can likely de-escalate later today or tomorrow  -see septic shock      Acute respiratory failure with hypoxia and hypercarbia  Patient with Hypercapnic and Hypoxic Respiratory failure which is Acute on chronic.  she is on home oxygen at 2 LPM. Supplemental oxygen was provided and noted- Oxygen Concentration (%):  [30] 30    .   Signs/symptoms of respiratory failure include- respiratory distress and lethargy. Contributing diagnoses includes - Pneumonia Labs and images were reviewed. Patient Has recent ABG, which has been reviewed. Will treat underlying causes and adjust management of respiratory failure as follows-     -treat pneumonia  -continue home inhaler regimen  -incentive spirometry  -nightly CPAP  -IV lasix daily to maintain euvolemia    Adrenal insufficiency  -stress dose IV hydrocortisone      History of stroke with residual deficit        Debility  -will need PT/OT once hemodynamically stable; will likely resume home health      Chronic diastolic heart failure  -BNP within normal range; does have some leg swelling but this is likely secondary to her lymphedema      Mixed stress and urge urinary incontinence  -oxybutynin      Suprapubic catheter  -in place   -UA not consistent with infection      Insomnia due to medical condition  -continue home trazodone      S/P insertion of intrathecal pump  -noted      S/P insertion of spinal cord stimulator  -noted      Lymphedema of both lower extremities  -noted   -IV Lasix given at admission, continue wrapping   -wound care      Primary hypothyroidism  -resume home thyroid medication   -TSH within normal limits      Neurogenic bladder  -continue home oxybutynin      Coronary artery disease of native  artery with stable angina pectoris  -continue home aspirin and statin      Narcotic dependency, continuous  -on intrathecal pain pump with breakthrough Norco      Chronic pain syndrome  -noted   -on intrathecal pain pump with p.r.n. hydrocodone      Major depressive disorder, recurrent, mild  Patient has recurrent depression which is mild and is currently controlled. Will Continue anti-depressant medications. We will not consult psychiatry at this time. Patient does not display psychosis at this time. Continue to monitor closely and adjust plan of care as needed.        Sleep apnea  -resume home CPAP      Generalized anxiety disorder  -continue home meds  -prn low dose ativan      VTE Risk Mitigation (From admission, onward)         Ordered     enoxaparin injection 40 mg  Daily         04/28/23 1115     IP VTE HIGH RISK PATIENT  Once         04/28/23 1115     Place sequential compression device  Until discontinued         04/28/23 1115                Discharge Planning   JACKIE:      Code Status: Full Code   Is the patient medically ready for discharge?:     Reason for patient still in hospital (select all that apply): Patient trending condition and Treatment                     Nic Mistry MD  Department of Hospital Medicine   [place] - Telemetry

## 2023-04-30 NOTE — PLAN OF CARE
Problem: Adult Inpatient Plan of Care  Goal: Plan of Care Review  Outcome: Ongoing, Progressing  Goal: Patient-Specific Goal (Individualized)  Outcome: Ongoing, Progressing  Goal: Absence of Hospital-Acquired Illness or Injury  Outcome: Ongoing, Progressing  Goal: Optimal Comfort and Wellbeing  Outcome: Ongoing, Progressing  Goal: Readiness for Transition of Care  Outcome: Ongoing, Progressing     Problem: Infection  Goal: Absence of Infection Signs and Symptoms  Outcome: Ongoing, Progressing     Problem: Adjustment to Illness (Sepsis/Septic Shock)  Goal: Optimal Coping  Outcome: Ongoing, Progressing     Problem: Bleeding (Sepsis/Septic Shock)  Goal: Absence of Bleeding  Outcome: Ongoing, Progressing     Problem: Glycemic Control Impaired (Sepsis/Septic Shock)  Goal: Blood Glucose Level Within Desired Range  Outcome: Ongoing, Progressing     Problem: Infection Progression (Sepsis/Septic Shock)  Goal: Absence of Infection Signs and Symptoms  Outcome: Ongoing, Progressing     Problem: Nutrition Impaired (Sepsis/Septic Shock)  Goal: Optimal Nutrition Intake  Outcome: Ongoing, Progressing     Problem: Fluid Imbalance (Pneumonia)  Goal: Fluid Balance  Outcome: Ongoing, Progressing     Problem: Infection (Pneumonia)  Goal: Resolution of Infection Signs and Symptoms  Outcome: Ongoing, Progressing     Problem: Respiratory Compromise (Pneumonia)  Goal: Effective Oxygenation and Ventilation  Outcome: Ongoing, Progressing     Problem: Fall Injury Risk  Goal: Absence of Fall and Fall-Related Injury  Outcome: Ongoing, Progressing     Problem: Skin Injury Risk Increased  Goal: Skin Health and Integrity  Outcome: Ongoing, Progressing     Problem: Anxiety  Goal: Anxiety Reduction or Resolution  Outcome: Ongoing, Progressing

## 2023-04-30 NOTE — SUBJECTIVE & OBJECTIVE
Interval History:  Still feeling short of breath and anxious, asking for benzo to help with anxiety. Legs still feel tight. She is sitting up watching TV, appears relatively comfortable. Repeat CXR with some improvement.    Review of Systems   Respiratory:  Positive for cough and shortness of breath.    Musculoskeletal:  Positive for back pain.   Objective:     Vital Signs (Most Recent):  Temp: 96.8 °F (36 °C) (04/30/23 0634)  Pulse: 74 (04/30/23 0634)  Resp: 18 (04/30/23 0938)  BP: (!) 115/57 (04/30/23 0634)  SpO2: (!) 94 % (04/30/23 0634)   Vital Signs (24h Range):  Temp:  [96.8 °F (36 °C)-98.1 °F (36.7 °C)] 96.8 °F (36 °C)  Pulse:  [61-85] 74  Resp:  [16-31] 18  SpO2:  [81 %-95 %] 94 %  BP: ()/(49-64) 115/57     Weight: 105.2 kg (232 lb)  Body mass index is 38.61 kg/m².    Intake/Output Summary (Last 24 hours) at 4/30/2023 0958  Last data filed at 4/30/2023 0632  Gross per 24 hour   Intake 1379.44 ml   Output 2795 ml   Net -1415.56 ml        Physical Exam  Vitals reviewed.   Constitutional:       General: She is not in acute distress.     Appearance: She is well-developed. She is ill-appearing. She is not diaphoretic.   HENT:      Head: Normocephalic and atraumatic.      Nose: Nose normal.   Eyes:      General: No scleral icterus.     Pupils: Pupils are equal, round, and reactive to light.   Neck:      Vascular: No JVD.      Trachea: No tracheal deviation.   Cardiovascular:      Rate and Rhythm: Normal rate and regular rhythm.      Heart sounds: Normal heart sounds.   Pulmonary:      Effort: Pulmonary effort is normal. No respiratory distress.      Breath sounds: Rales present.      Comments: On supplemental oxygen  Abdominal:      General: There is no distension.      Palpations: Abdomen is soft.      Tenderness: There is no abdominal tenderness.   Musculoskeletal:         General: No deformity.      Cervical back: Normal range of motion.      Right lower leg: Edema present.      Left lower leg: Edema  present.   Skin:     General: Skin is warm and dry.      Findings: No rash.   Neurological:      Mental Status: She is alert and oriented to person, place, and time.   Psychiatric:         Behavior: Behavior normal.       Significant Labs: All pertinent labs within the past 24 hours have been reviewed.    Significant Imaging: I have reviewed all pertinent imaging results/findings within the past 24 hours.

## 2023-04-30 NOTE — ASSESSMENT & PLAN NOTE
-chest x-ray concerning for multifocal pneumonia   -initiated on broad-spectrum antibiotics   -respiratory sputum cultures and blood cultures pending; can likely de-escalate later today or tomorrow  -see septic shock     Addended by: Jannelle Kocher on: 4/11/2017 10:45 AM     Modules accepted: Orders

## 2023-04-30 NOTE — PLAN OF CARE
1900 Pt appeared to be in no distress. Reported no pain.     2100 Accu Ck: none    Tele: NS, HR 60, No alarms.     Bed in lowest position, wheels locked, non skid socks, ID band worn, personal items and call bell with in reach, bed alarm set.

## 2023-04-30 NOTE — PT/OT/SLP EVAL
Occupational Therapy   Evaluation    Name: Tasha Hawley  MRN: [id number]  Admitting Diagnosis: Sepsis with encephalopathy and septic shock  Recent Surgery: * No surgery found *      Recommendations:     Discharge Recommendations: nursing facility, skilled  Discharge Equipment Recommendations:  none  Barriers to discharge:  None    Assessment:     Tasha Hawley is a 67 y.o. female with a medical diagnosis of Sepsis with encephalopathy and septic shock.  She presents with the following performance deficits affecting function: weakness, impaired endurance, impaired sensation, impaired self care skills, impaired functional mobility, gait instability, impaired balance, decreased coordination, decreased upper extremity function, decreased lower extremity function, decreased safety awareness, pain, decreased ROM, impaired coordination, impaired skin, edema, impaired cardiopulmonary response to activity.  Pt was agreeable to OT/PT evaluation.  Pt reports that she was mod I with func mobility and needed A with LB ADLs.  Pt also reports frequent falls (3 in last 2 months).  Pt currently requires CGA- Min A with func mobility and set up to total A with ADLs. Goals established to assist pt with returning to [facility] regarding ADLs and func mobility.  Pt will benefit from skilled OT services in order to increase her level of safety and independence with ADLs and mobility.      Rehab Prognosis: Good; patient would benefit from acute skilled OT services to address these deficits and reach maximum level of function.       Plan:     Patient to be seen 3 x/week to address the above listed problems via self-care/home management, therapeutic activities, therapeutic exercises  Plan of Care Expires:    Plan of Care Reviewed with: patient    Subjective     Chief Complaint: SOB  Patient/Family Comments/goals: walk more    Occupational Profile:  Living Environment: pt lives with her  in a [facility] access - t/s combo for  bathing with TTB  Previous level of function: mod I with mobility using RW, however, pt reports frequent falls (3 in last 2 months) - needs A with LB ADLs  Roles and Routines: wife  Equipment Used at Home: bedside commode, walker, rolling, wheelchair, bath bench, oxygen, rollator  Assistance upon Discharge: assistance from     Pain/Comfort:  Pain Rating 1: 9/10  Location - Orientation 1: lower  Location 1: back  Pain Addressed 1: Reposition, Distraction, Cessation of Activity, Nurse notified  Pain Rating Post-Intervention 1: 8/10    Patients cultural, spiritual, Religion conflicts given the current situation: no    Objective:     Communicated with: nurse prior to session.  Patient found HOB elevated with bed alarm, oxygen, telemetry (suprapubic catheter) upon OT entry to room.    General Precautions: Standard, fall  Orthopedic Precautions: N/A  Braces:  (unna boots)  Respiratory Status: Nasal cannula, flow 5 L/min    Occupational Performance:    Bed Mobility:    Patient completed Rolling/Turning to Right with contact guard assistance and with side rail  Patient completed Scooting/Bridging with contact guard assistance and increased time  Patient completed Supine to Sit with contact guard assistance and increased time    Functional Mobility/Transfers:  Patient completed Sit <> Stand Transfer with contact guard assistance  with  rolling walker   Patient completed Toilet Transfer Stand Pivot technique with contact guard assistance with  bedside commode  Functional Mobility: pt walked in room using RW with min A - noted with forward / left flexed posture and unable to correct - refer to PT eval for further mobility info    Activities of Daily Living:  Grooming: set up    Lower Body Dressing: total assistance for socks    Cognitive/Visual Perceptual:  Cognitive/Psychosocial Skills:     -       Oriented to: Person, Place, Time, and Situation   -       Follows Commands/attention:Follows one-step commands  -        Communication: clear/fluent  -       Memory: No Deficits noted  -       Safety awareness/insight to disability: impaired   -       Mood/Affect/Coping skills/emotional control: pt anxious at times    Physical Exam:  Balance: -       sitting = good;  standing = CGA-min A  Postural examination/scapula alignment:    -       Rounded shoulders  -       Forward head  -       Posterior pelvic tilt  -       Kyphosis  Skin integrity: impaired on B LEs  Edema:  Severe B LEs  Sensation: -       Impaired  on B lower legs  Dominant hand: -       right  Upper Extremity Range of Motion:   BUEs = WFL except for B shoulders - ~80-90* flexion  Upper Extremity Strength:  BUEs = WFL for ADLs   Strength:  B hands = fair    AMPAC 6 Click ADL:  AMPAC Total Score: 17    Treatment & Education:  Pt completed ADLs and func mobility activities for tx session as noted above  Pt educated on role of OT and POC      Patient left  on BSC   with all lines intact, call button in reach, and nurse present    GOALS:   Multidisciplinary Problems       Occupational Therapy Goals          Problem: Occupational Therapy    Goal Priority Disciplines Outcome Interventions   Occupational Therapy Goal     OT, PT/OT Ongoing, Progressing    Description: Goals to be met by: 05/30/23     Patient will increase functional independence with ADLs by performing:    UE Dressing with Modified [place].  LE Dressing with Minimal Assistance and Assistive Devices as needed.  Grooming while EOB with Set-up Assistance.  Toileting from bedside commode with Minimal Assistance for hygiene and clothing management.   Toilet transfer to bedside commode with Stand-by Assistance.  Upper extremity exercise program 3x10 reps per handout, with supervision.                         History:     Past Medical History:   Diagnosis Date    Anticoagulant long-term use     Anxiety     Arthritis     Bilateral lower extremity edema     severe chronic    Carotid artery occlusion     Cataract      CHF (congestive heart failure)     Coronary artery disease     subtotalled LAD with collateral    Depression     Fever blister     Hard of hearing     Hypokalemia 1/9/2023    Hyponatremia 2/4/2022    Hypothyroid     Iron deficiency anemia     Lumbar radiculopathy     with chronic pain    Ocular migraine     Renal disorder     Sleep apnea     cpap         Past Surgical History:   Procedure Laterality Date    ADENOIDECTOMY      BACK SURGERY      x 12    CARDIAC CATHETERIZATION  2016    subtotalled LAD with right to left collaterals    CATARACT EXTRACTION W/  INTRAOCULAR LENS IMPLANT Left     Dr Coleman     CYSTOSCOPIC LITHOLAPAXY N/A 6/27/2019    Procedure: CYSTOLITHOLAPAXY;  Surgeon: Shireen Mayo MD;  Location: [facility] OR 2ND FLR;  Service: Urology;  Laterality: N/A;    CYSTOSCOPIC LITHOLAPAXY N/A 9/3/2019    Procedure: CYSTOLITHOLAPAXY;  Surgeon: Shireen Mayo MD;  Location: [facility] OR 2ND FLR;  Service: Urology;  Laterality: N/A;    CYSTOSCOPY N/A 7/13/2021    Procedure: CYSTOSCOPY;  Surgeon: Shireen Mayo MD;  Location: [facility] OR 1ST FLR;  Service: Urology;  Laterality: N/A;    CYSTOSCOPY  11/16/2021    Procedure: CYSTOSCOPY;  Surgeon: Shireen Mayo MD;  [facility];  Service: Urology;;    CYSTOSCOPY  7/19/2022    Procedure: CYSTOSCOPY;  Surgeon: Shireen Mayo MD;  [facility];  Service: Urology;;    CYSTOSCOPY WITH INJECTION OF PERIURETHRAL BULKING AGENT  7/19/2022    Procedure: CYSTOSCOPY, WITH PERIURETHRAL BULKING AGENT INJECTION-MACROPLASTIQUE;  Surgeon: Shireen Mayo MD;  Location: [facility] OR 1ST FLR;  Service: Urology;;    CYSTOSCOPY WITH INJECTION OF PERIURETHRAL BULKING AGENT N/A 3/28/2023    Procedure: CYSTOSCOPY, WITH PERIURETHRAL BULKING AGENT INJECTION;  Surgeon: Shireen Mayo MD;  Location: [facility] OR 1ST FLR;  Service: Urology;  Laterality: N/A;  [place]    CYSTOSCOPY,WITH BOTULINUM TOXIN INJECTION N/A 12/13/2022    Procedure: CYSTOSCOPY,WITH BOTULINUM  TOXIN INJECTION;  Surgeon: Shireen Mayo MD;  [facility];  Service: Urology;  Laterality: N/A;  300 U    CYSTOSCOPY,WITH BOTULINUM TOXIN INJECTION N/A 3/28/2023    Procedure: CYSTOSCOPY,WITH BOTULINUM TOXIN INJECTION;  Surgeon: Shireen Mayo MD;  [facility];  Service: Urology;  Laterality: N/A;  45 MIN.    300 UNITS    ESOPHAGOGASTRODUODENOSCOPY N/A 5/23/2018    Procedure: ESOPHAGOGASTRODUODENOSCOPY (EGD);  Surgeon: Prince Vance MD;  Location: [facility] (4TH FLR);  Service: Endoscopy;  Laterality: N/A;  r/s 'd per Dr. Vance due to family emergency- ER    HYSTERECTOMY  1975    endometriosis    INJECTION OF BOTULINUM TOXIN TYPE A  7/13/2021    Procedure: INJECTION, BOTULINUM TOXIN, 200units;  Surgeon: Shireen Mayo MD;  [facility];  Service: Urology;;    INJECTION OF BOTULINUM TOXIN TYPE A  11/16/2021    Procedure: INJECTION, BOTULINUM TOXIN, 200units;  Surgeon: Shireen Mayo MD;  [facility];  Service: Urology;;    INJECTION OF BOTULINUM TOXIN TYPE A  7/19/2022    Procedure: INJECTION, BOTULINUM TOXIN, 300 units ;  Surgeon: Shireen Mayo MD;  [facility];  Service: Urology;;    INSERTION, SUPRAPUBIC CATHETER N/A 12/13/2022    Procedure: INSERTION, SUPRAPUBIC CATHETER;  Surgeon: Shireen Mayo MD;  Location: [facility] OR [address] [place] [address];  Service: Urology;  Laterality: N/A;  exchange    pain pump placement      SQ Dilaudid Pump managed by Dr. Hillman, Pain Management    REMOVAL OF BONE SPUR OF FOOT Bilateral 9/16/2022    Procedure: EXCISION ARTHRITIC BONE, BILATERAL FOOT;  Surgeon: Adam Mcguire DPM;  Location: [facility] OR;  Service: Podiatry;  Laterality: Bilateral;    REPLACEMENT OF CATHETER N/A 10/31/2019    Procedure: REPLACEMENT, CATHETER-SUPRAPUBIC;  Surgeon: Shireen Mayo MD;  Location: [facility] OR [address] [place] [address];  Service: Urology;  Laterality: N/A;    SPINAL CORD STIMULATOR REMOVAL      before [place]    SPINE SURGERY   5-13-13    CERVICAL FUSION    TONSILLECTOMY         Time Tracking:     OT Date of Treatment: 04/30/23  OT Start Time: 1013  OT Stop Time: 1042  OT Total Time (min): 29 min    Billable Minutes:Evaluation 10  Self Care/Home Management 8    4/30/2023

## 2023-04-30 NOTE — ASSESSMENT & PLAN NOTE
-presents with septic shock presumably due to multifocal pneumonia; also likely component of adrenal insufficiency  -mild encephalopathy, lethargy; now resolved  -fluid resuscitated in the ED without appropriate response   -initiated on Levophed, weaned off today  -blood cultures collected in the ED   -initial lactic acid 2.6; has improved upon reassessment  -initiate on broad-spectrum antibiotics; she has grown ESBL and MSSA in the past  -stepped out of ICU 4/29

## 2023-04-30 NOTE — PLAN OF CARE
Problem: Occupational Therapy  Goal: Occupational Therapy Goal  Description: Goals to be met by: 05/30/23     Patient will increase functional independence with ADLs by performing:    UE Dressing with Modified [place].  LE Dressing with Minimal Assistance and Assistive Devices as needed.  Grooming while EOB with Set-up Assistance.  Toileting from bedside commode with Minimal Assistance for hygiene and clothing management.   Toilet transfer to bedside commode with Stand-by Assistance.  Upper extremity exercise program 3x10 reps per handout, with supervision.    Outcome: Ongoing, Progressing     Pt was agreeable to OT/PT evaluation.  Pt reports that she was mod I with func mobility and needed A with LB ADLs.  Pt also reports frequent falls (3 in last 2 months).  Pt currently requires CGA- Min A with func mobility and set up to total A with ADLs. Goals established to assist pt with returning to [facility] regarding ADLs and func mobility.  Pt will benefit from skilled OT services in order to increase her level of safety and independence with ADLs and mobility.      Lisa Cartwright, OT  4/30/2023

## 2023-04-30 NOTE — PLAN OF CARE
Patient seen for physical therapy evaluation on this date, co-evaluation with occupational therapy.  Patient is agreeable to therapy, stating her back pain limits her.  Full report to follow.  Patient with history of 3 falls in last two months.  She performs bed mobility at CGA, transfers at CGA, and gait with RW x 40' with Min A, posture is with max trunk flexion and leans towards left, very slow pace, unsteady.  Anticipate patient will benefit from rehabilitation in skilled nursing facility to address limitations.  No DME needs should she return home.      Problem: Physical Therapy  Goal: Physical Therapy Goal  Description: Goals to be met by: 2023     Patient will increase functional independence with mobility by performin. Supine to sit with Stand-by Assistance  2. Sit to supine with Stand-by Assistance  3. Rolling to Left and Right with Stand-by Assistance.  4. Sit to stand transfer with Stand-by Assistance  5. Bed to chair transfer with Stand-by Assistance using Rolling Walker  6. Gait  x 100 feet with Supervision using Rolling Walker.     Outcome: Ongoing, Progressing

## 2023-05-01 LAB
ANION GAP SERPL CALC-SCNC: 8 MMOL/L (ref 8–16)
BACTERIA SPEC AEROBE CULT: NORMAL
BACTERIA SPEC AEROBE CULT: NORMAL
BASOPHILS # BLD AUTO: 0.01 K/UL (ref 0–0.2)
BASOPHILS NFR BLD: 0.1 % (ref 0–1.9)
BUN SERPL-MCNC: 8 MG/DL (ref 8–23)
CALCIUM SERPL-MCNC: 9 MG/DL (ref 8.7–10.5)
CHLORIDE SERPL-SCNC: 98 MMOL/L (ref 95–110)
CO2 SERPL-SCNC: 34 MMOL/L (ref 23–29)
CREAT SERPL-MCNC: 0.7 MG/DL (ref 0.5–1.4)
DIFFERENTIAL METHOD: ABNORMAL
EOSINOPHIL # BLD AUTO: 0 K/UL (ref 0–0.5)
EOSINOPHIL NFR BLD: 0.5 % (ref 0–8)
ERYTHROCYTE [DISTWIDTH] IN BLOOD BY AUTOMATED COUNT: 14.5 % (ref 11.5–14.5)
EST. GFR  (NO RACE VARIABLE): >60 ML/MIN/1.73 M^2
GLUCOSE SERPL-MCNC: 130 MG/DL (ref 70–110)
GRAM STN SPEC: NORMAL
HCT VFR BLD AUTO: 28.6 % (ref 37–48.5)
HGB BLD-MCNC: 8.7 G/DL (ref 12–16)
IMM GRANULOCYTES # BLD AUTO: 0.03 K/UL (ref 0–0.04)
IMM GRANULOCYTES NFR BLD AUTO: 0.4 % (ref 0–0.5)
L PNEUMO AG UR QL IA: NEGATIVE
LYMPHOCYTES # BLD AUTO: 0.6 K/UL (ref 1–4.8)
LYMPHOCYTES NFR BLD: 7.1 % (ref 18–48)
MAGNESIUM SERPL-MCNC: 2 MG/DL (ref 1.6–2.6)
MCH RBC QN AUTO: 26.2 PG (ref 27–31)
MCHC RBC AUTO-ENTMCNC: 30.4 G/DL (ref 32–36)
MCV RBC AUTO: 86 FL (ref 82–98)
MONOCYTES # BLD AUTO: 0.5 K/UL (ref 0.3–1)
MONOCYTES NFR BLD: 6.3 % (ref 4–15)
NEUTROPHILS # BLD AUTO: 6.8 K/UL (ref 1.8–7.7)
NEUTROPHILS NFR BLD: 85.6 % (ref 38–73)
NRBC BLD-RTO: 0 /100 WBC
PLATELET # BLD AUTO: 251 K/UL (ref 150–450)
PMV BLD AUTO: 9.6 FL (ref 9.2–12.9)
POTASSIUM SERPL-SCNC: 3.7 MMOL/L (ref 3.5–5.1)
RBC # BLD AUTO: 3.32 M/UL (ref 4–5.4)
SODIUM SERPL-SCNC: 140 MMOL/L (ref 136–145)
WBC # BLD AUTO: 7.94 K/UL (ref 3.9–12.7)

## 2023-05-01 PROCEDURE — 27000221 HC OXYGEN, UP TO 24 HOURS: Mod: HCNC

## 2023-05-01 PROCEDURE — 94660 CPAP INITIATION&MGMT: CPT | Mod: HCNC

## 2023-05-01 PROCEDURE — 85025 COMPLETE CBC W/AUTO DIFF WBC: CPT | Mod: HCNC | Performed by: HOSPITALIST

## 2023-05-01 PROCEDURE — A4216 STERILE WATER/SALINE, 10 ML: HCPCS | Mod: HCNC | Performed by: HOSPITALIST

## 2023-05-01 PROCEDURE — 63600175 PHARM REV CODE 636 W HCPCS: Mod: HCNC | Performed by: HOSPITALIST

## 2023-05-01 PROCEDURE — 94761 N-INVAS EAR/PLS OXIMETRY MLT: CPT | Mod: HCNC

## 2023-05-01 PROCEDURE — 80048 BASIC METABOLIC PNL TOTAL CA: CPT | Mod: HCNC | Performed by: HOSPITALIST

## 2023-05-01 PROCEDURE — 97530 THERAPEUTIC ACTIVITIES: CPT | Mod: HCNC,CQ

## 2023-05-01 PROCEDURE — 97530 THERAPEUTIC ACTIVITIES: CPT | Mod: HCNC

## 2023-05-01 PROCEDURE — 94799 UNLISTED PULMONARY SVC/PX: CPT | Mod: HCNC

## 2023-05-01 PROCEDURE — 94640 AIRWAY INHALATION TREATMENT: CPT | Mod: HCNC

## 2023-05-01 PROCEDURE — 63600175 PHARM REV CODE 636 W HCPCS: Mod: HCNC | Performed by: STUDENT IN AN ORGANIZED HEALTH CARE EDUCATION/TRAINING PROGRAM

## 2023-05-01 PROCEDURE — 11000001 HC ACUTE MED/SURG PRIVATE ROOM: Mod: HCNC

## 2023-05-01 PROCEDURE — 97116 GAIT TRAINING THERAPY: CPT | Mod: HCNC,CQ

## 2023-05-01 PROCEDURE — 25000003 PHARM REV CODE 250: Mod: HCNC | Performed by: HOSPITALIST

## 2023-05-01 PROCEDURE — 36415 COLL VENOUS BLD VENIPUNCTURE: CPT | Mod: HCNC | Performed by: HOSPITALIST

## 2023-05-01 PROCEDURE — 83735 ASSAY OF MAGNESIUM: CPT | Mod: HCNC | Performed by: HOSPITALIST

## 2023-05-01 PROCEDURE — 97535 SELF CARE MNGMENT TRAINING: CPT | Mod: HCNC

## 2023-05-01 PROCEDURE — 99900035 HC TECH TIME PER 15 MIN (STAT): Mod: HCNC

## 2023-05-01 RX ORDER — GUAIFENESIN 600 MG/1
600 TABLET, EXTENDED RELEASE ORAL 2 TIMES DAILY
Status: DISCONTINUED | OUTPATIENT
Start: 2023-05-01 | End: 2023-05-01

## 2023-05-01 RX ORDER — GUAIFENESIN 100 MG/5ML
200 SOLUTION ORAL EVERY 4 HOURS PRN
Status: DISCONTINUED | OUTPATIENT
Start: 2023-05-01 | End: 2023-05-05 | Stop reason: HOSPADM

## 2023-05-01 RX ORDER — DOXYCYCLINE HYCLATE 100 MG
100 TABLET ORAL EVERY 12 HOURS
Status: DISCONTINUED | OUTPATIENT
Start: 2023-05-01 | End: 2023-05-05 | Stop reason: HOSPADM

## 2023-05-01 RX ORDER — KETOROLAC TROMETHAMINE 30 MG/ML
15 INJECTION, SOLUTION INTRAMUSCULAR; INTRAVENOUS ONCE
Status: COMPLETED | OUTPATIENT
Start: 2023-05-01 | End: 2023-05-01

## 2023-05-01 RX ADMIN — QUETIAPINE FUMARATE 200 MG: 100 TABLET ORAL at 09:05

## 2023-05-01 RX ADMIN — Medication 10 ML: at 05:05

## 2023-05-01 RX ADMIN — HYDROCORTISONE SODIUM SUCCINATE 50 MG: 100 INJECTION, POWDER, FOR SOLUTION INTRAMUSCULAR; INTRAVENOUS at 09:05

## 2023-05-01 RX ADMIN — GUAIFENESIN 600 MG: 600 TABLET, EXTENDED RELEASE ORAL at 09:05

## 2023-05-01 RX ADMIN — LIDOCAINE 1 PATCH: 50 PATCH CUTANEOUS at 09:05

## 2023-05-01 RX ADMIN — FLUTICASONE PROPIONATE 100 MCG: 50 SPRAY, METERED NASAL at 09:05

## 2023-05-01 RX ADMIN — FUROSEMIDE 40 MG: 10 INJECTION, SOLUTION INTRAMUSCULAR; INTRAVENOUS at 09:05

## 2023-05-01 RX ADMIN — TRAZODONE HYDROCHLORIDE 300 MG: 100 TABLET ORAL at 09:05

## 2023-05-01 RX ADMIN — PANTOPRAZOLE SODIUM 40 MG: 40 TABLET, DELAYED RELEASE ORAL at 09:05

## 2023-05-01 RX ADMIN — LORAZEPAM 0.5 MG: 0.5 TABLET ORAL at 09:05

## 2023-05-01 RX ADMIN — MUPIROCIN: 20 OINTMENT TOPICAL at 09:05

## 2023-05-01 RX ADMIN — CEFTRIAXONE 1 G: 1 INJECTION, POWDER, FOR SOLUTION INTRAMUSCULAR; INTRAVENOUS at 09:05

## 2023-05-01 RX ADMIN — ENOXAPARIN SODIUM 40 MG: 40 INJECTION SUBCUTANEOUS at 04:05

## 2023-05-01 RX ADMIN — HYDROCODONE BITARTRATE AND ACETAMINOPHEN 1 TABLET: 10; 325 TABLET ORAL at 09:05

## 2023-05-01 RX ADMIN — Medication 10 ML: at 09:05

## 2023-05-01 RX ADMIN — HYDROCORTISONE SODIUM SUCCINATE 50 MG: 250 INJECTION, POWDER, FOR SOLUTION INTRAMUSCULAR; INTRAVENOUS at 05:05

## 2023-05-01 RX ADMIN — TIOTROPIUM BROMIDE INHALATION SPRAY 2 PUFF: 3.12 SPRAY, METERED RESPIRATORY (INHALATION) at 08:05

## 2023-05-01 RX ADMIN — LEVOTHYROXINE SODIUM 150 MCG: 75 TABLET ORAL at 05:05

## 2023-05-01 RX ADMIN — DOXYCYCLINE 100 MG: 100 INJECTION, POWDER, LYOPHILIZED, FOR SOLUTION INTRAVENOUS at 04:05

## 2023-05-01 RX ADMIN — CYANOCOBALAMIN TAB 1000 MCG 1000 MCG: 1000 TAB at 09:05

## 2023-05-01 RX ADMIN — HYDROCODONE BITARTRATE AND ACETAMINOPHEN 1 TABLET: 10; 325 TABLET ORAL at 04:05

## 2023-05-01 RX ADMIN — DOXYCYCLINE HYCLATE 100 MG: 100 TABLET, COATED ORAL at 09:05

## 2023-05-01 RX ADMIN — HYDROXYZINE HYDROCHLORIDE 50 MG: 25 TABLET, FILM COATED ORAL at 04:05

## 2023-05-01 RX ADMIN — FLUOXETINE HYDROCHLORIDE 60 MG: 20 CAPSULE ORAL at 09:05

## 2023-05-01 RX ADMIN — OXYBUTYNIN CHLORIDE 10 MG: 5 TABLET, EXTENDED RELEASE ORAL at 09:05

## 2023-05-01 RX ADMIN — KETOROLAC TROMETHAMINE 15 MG: 30 INJECTION, SOLUTION INTRAMUSCULAR; INTRAVENOUS at 09:05

## 2023-05-01 RX ADMIN — ATORVASTATIN CALCIUM 80 MG: 40 TABLET, FILM COATED ORAL at 09:05

## 2023-05-01 RX ADMIN — FLUDROCORTISONE ACETATE 100 MCG: 0.1 TABLET ORAL at 09:05

## 2023-05-01 RX ADMIN — MEROPENEM 1 G: 1 INJECTION, POWDER, FOR SOLUTION INTRAVENOUS at 03:05

## 2023-05-01 RX ADMIN — ASPIRIN 81 MG: 81 TABLET, COATED ORAL at 09:05

## 2023-05-01 RX ADMIN — SODIUM CHLORIDE: 9 INJECTION, SOLUTION INTRAVENOUS at 04:05

## 2023-05-01 RX ADMIN — LIOTHYRONINE SODIUM 25 MCG: 25 TABLET ORAL at 09:05

## 2023-05-01 RX ADMIN — ONDANSETRON 8 MG: 8 TABLET, ORALLY DISINTEGRATING ORAL at 04:05

## 2023-05-01 NOTE — PROGRESS NOTES
[facility]  Progress Note    Patient Name: Tasha Hawley  MRN: [id number]  Patient Class: IP- Inpatient   Admission Date: 4/28/2023  Length of Stay: 3 days  Attending Physician: Nic Mistry, *  Primary Care Provider: Mesfin Hodges Ii, MD        Subjective:     Principal Problem:Sepsis with encephalopathy and septic shock        HPI:  Ms. Hawley is a 66yo woman with adrenal insufficiency, chronic pain on dilaudid intrathecal pump, sleep apnea, hypothyroidism, lymphedema, suprapubic catheter who presents with several days of shortness of breath.  States that over the past few days she has been having worsening shortness of breath along with productive cough.  Denies any fevers or chills, although she does have a recorded fever upon admission here.  She does use supplemental oxygen at home and usually uses CPAP device.  She also states that she has had some chest pain that started yesterday.  Worse with deep inspiration.  She notes bilateral lower extremity swelling as well.      Overview/Hospital Course:  No notes on file    Interval History:  Able to sleep last night.  She reports that she is having difficulty bringing anything up with her cough; we will add guaifenesin as a mucolytic.  Cultures have been negative so far; will deescalate antibiotics.    Review of Systems   Respiratory:  Positive for cough and shortness of breath.    Musculoskeletal:  Positive for back pain.   Objective:     Vital Signs (Most Recent):  Temp: 97 °F (36.1 °C) (05/01/23 1149)  Pulse: (!) 57 (05/01/23 1212)  Resp: 17 (05/01/23 1155)  BP: (!) 103/54 (05/01/23 1149)  SpO2: 97 % (05/01/23 1155)   Vital Signs (24h Range):  Temp:  [96 °F (35.6 °C)-98.6 °F (37 °C)] 97 °F (36.1 °C)  Pulse:  [52-77] 57  Resp:  [16-20] 17  SpO2:  [91 %-97 %] 97 %  BP: ()/(54-68) 103/54     Weight: 105.2 kg (232 lb)  Body mass index is 38.61 kg/m².    Intake/Output Summary (Last 24 hours) at 5/1/2023 1252  Last  data filed at 5/1/2023 0845  Gross per 24 hour   Intake 530 ml   Output 2200 ml   Net -1670 ml        Physical Exam  Vitals reviewed.   Constitutional:       General: She is not in acute distress.     Appearance: She is well-developed. She is ill-appearing. She is not diaphoretic.   HENT:      Head: Normocephalic and atraumatic.      Nose: Nose normal.   Eyes:      General: No scleral icterus.     Pupils: Pupils are equal, round, and reactive to light.   Neck:      Vascular: No JVD.      Trachea: No tracheal deviation.   Cardiovascular:      Rate and Rhythm: Normal rate and regular rhythm.      Heart sounds: Normal heart sounds.   Pulmonary:      Effort: Pulmonary effort is normal. No respiratory distress.      Breath sounds: Rales present.      Comments: On supplemental oxygen  Abdominal:      General: There is no distension.      Palpations: Abdomen is soft.      Tenderness: There is no abdominal tenderness.   Musculoskeletal:         General: No deformity.      Cervical back: Normal range of motion.      Right lower leg: Edema present.      Left lower leg: Edema present.   Skin:     General: Skin is warm and dry.      Findings: No rash.   Neurological:      Mental Status: She is alert and oriented to person, place, and time.   Psychiatric:         Behavior: Behavior normal.       Significant Labs: All pertinent labs within the past 24 hours have been reviewed.    Significant Imaging: I have reviewed all pertinent imaging results/findings within the past 24 hours.      Assessment/Plan:      * Sepsis with encephalopathy and septic shock  -presents with septic shock presumably due to multifocal pneumonia; also likely component of adrenal insufficiency  -mild encephalopathy, lethargy; now resolved  -fluid resuscitated in the ED without appropriate response   -initiated on Levophed, now discontinued  -stress dose steroids, weaning back to home doses  -blood cultures collected in the ED   -initial lactic acid 2.6; has  improved upon reassessment  -initiate on broad-spectrum antibiotics; she has grown ESBL and MSSA in the past  -stepped out of ICU 4/29    Multifocal pneumonia  -chest x-ray concerning for multifocal pneumonia   -initiated on broad-spectrum antibiotics   -respiratory sputum cultures and blood cultures no growth to date; de-escalate to ceftriaxone/doxy  -see septic shock      Acute respiratory failure with hypoxia and hypercarbia  Patient with Hypercapnic and Hypoxic Respiratory failure which is Acute on chronic.  she is on home oxygen at 2 LPM. Supplemental oxygen was provided and noted- Oxygen Concentration (%):  [35] 35    .   Signs/symptoms of respiratory failure include- respiratory distress and lethargy. Contributing diagnoses includes - Pneumonia Labs and images were reviewed. Patient Has recent ABG, which has been reviewed. Will treat underlying causes and adjust management of respiratory failure as follows-     -treat pneumonia  -continue home inhaler regimen  -incentive spirometry  -nightly CPAP  -IV lasix daily to maintain euvolemia    Adrenal insufficiency  -stress dose IV hydrocortisone 50mg q6 initially, will titrate down towards home PO dose      History of stroke with residual deficit        Debility  -PT/OT following, recommend SNF      Chronic diastolic heart failure  -BNP within normal range; does have some leg swelling but this is likely secondary to her lymphedema      Mixed stress and urge urinary incontinence  -oxybutynin      Suprapubic catheter  -in place   -UA not consistent with infection      Insomnia due to medical condition  -continue home trazodone      S/P insertion of intrathecal pump  -noted      S/P insertion of spinal cord stimulator  -noted      Lymphedema of both lower extremities  -noted   -IV Lasix given at admission, continue wrapping   -wound care      Primary hypothyroidism  -resume home thyroid medication   -TSH within normal limits      Neurogenic bladder  -continue home  oxybutynin      Coronary artery disease of native artery with stable angina pectoris  -continue home aspirin and statin      Narcotic dependency, continuous  -on intrathecal pain pump with breakthrough Norco      Chronic pain syndrome  -noted   -on intrathecal pain pump with p.r.n. hydrocodone      Major depressive disorder, recurrent, mild  Patient has recurrent depression which is mild and is currently controlled. Will Continue anti-depressant medications. We will not consult psychiatry at this time. Patient does not display psychosis at this time. Continue to monitor closely and adjust plan of care as needed.        Sleep apnea  -resume home CPAP      Generalized anxiety disorder  -continue home meds  -prn low dose ativan      VTE Risk Mitigation (From admission, onward)         Ordered     enoxaparin injection 40 mg  Daily         04/28/23 1115     IP VTE HIGH RISK PATIENT  Once         04/28/23 1115     Place sequential compression device  Until discontinued         04/28/23 1115                Discharge Planning   JACKIE:      Code Status: Full Code   Is the patient medically ready for discharge?:     Reason for patient still in hospital (select all that apply): Patient trending condition and Treatment  Discharge Plan A: Home with family, Home Health                  Nic Mistry MD  Department of Hospital Medicine   [facility]

## 2023-05-01 NOTE — ASSESSMENT & PLAN NOTE
-presents with septic shock presumably due to multifocal pneumonia; also likely component of adrenal insufficiency  -mild encephalopathy, lethargy; now resolved  -fluid resuscitated in the ED without appropriate response   -initiated on Levophed, now discontinued  -stress dose steroids, weaning back to home doses  -blood cultures collected in the ED   -initial lactic acid 2.6; has improved upon reassessment  -initiate on broad-spectrum antibiotics; she has grown ESBL and MSSA in the past  -stepped out of ICU 4/29

## 2023-05-01 NOTE — PLAN OF CARE
Problem: Physical Therapy  Goal: Physical Therapy Goal  Description: Goals to be met by: 2023     Patient will increase functional independence with mobility by performin. Supine to sit with Stand-by Assistance  2. Sit to supine with Stand-by Assistance  3. Rolling to Left and Right with Stand-by Assistance.  4. Sit to stand transfer with Stand-by Assistance  5. Bed to chair transfer with Stand-by Assistance using Rolling Walker  6. Gait  x 100 feet with Supervision using Rolling Walker.     Outcome: Ongoing, Progressing

## 2023-05-01 NOTE — ASSESSMENT & PLAN NOTE
Patient with Hypercapnic and Hypoxic Respiratory failure which is Acute on chronic.  she is on home oxygen at 2 LPM. Supplemental oxygen was provided and noted- Oxygen Concentration (%):  [35] 35    .   Signs/symptoms of respiratory failure include- respiratory distress and lethargy. Contributing diagnoses includes - Pneumonia Labs and images were reviewed. Patient Has recent ABG, which has been reviewed. Will treat underlying causes and adjust management of respiratory failure as follows-     -treat pneumonia  -continue home inhaler regimen  -incentive spirometry  -nightly CPAP  -IV lasix daily to maintain euvolemia

## 2023-05-01 NOTE — PLAN OF CARE
Problem: Adult Inpatient Plan of Care  Goal: Plan of Care Review  Outcome: Ongoing, Progressing  Goal: Patient-Specific Goal (Individualized)  Outcome: Ongoing, Progressing  Goal: Absence of Hospital-Acquired Illness or Injury  Outcome: Ongoing, Progressing  Goal: Optimal Comfort and Wellbeing  Outcome: Ongoing, Progressing     Problem: Infection  Goal: Absence of Infection Signs and Symptoms  Outcome: Ongoing, Progressing     Problem: Adjustment to Illness (Sepsis/Septic Shock)  Goal: Optimal Coping  Outcome: Ongoing, Progressing     Problem: Glycemic Control Impaired (Sepsis/Septic Shock)  Goal: Blood Glucose Level Within Desired Range  Outcome: Ongoing, Progressing     Problem: Nutrition Impaired (Sepsis/Septic Shock)  Goal: Optimal Nutrition Intake  Outcome: Ongoing, Progressing     Problem: Infection (Pneumonia)  Goal: Resolution of Infection Signs and Symptoms  Outcome: Ongoing, Progressing     Problem: Fall Injury Risk  Goal: Absence of Fall and Fall-Related Injury  Outcome: Ongoing, Progressing

## 2023-05-01 NOTE — ASSESSMENT & PLAN NOTE
-continue home aspirin and statin     [Home] : at home, [unfilled] , at the time of the visit. [Medical Office: ([facility])___] : at the medical office located in  [Verbal consent obtained from patient] : the patient, [unfilled] [FreeTextEntry1] : Pt presents for a follow up on her abdominal pain and weight loss. [de-identified] : The patient telephoned and requested a call back to discuss their health.  The visit was performed over the telephone as the patient was unable to be seen in the office due to the COVID 19 pandemic.  She did have some abdominal pain and bloating over the weekend but as of last night feels a little better.  She hasn't had the back pain lately.  She have a normal bowel movement yesterday evening then had loose stools and fecal incontinence later in the night.  She is still only able to eat bread and butter and donuts.  She will try to drink the muscle milk.  Her weight is still 80 pounds.  Her BP has been in the 110-130's/70's.  She has an appointment with Dr Conklin tomorrow.\par

## 2023-05-01 NOTE — SUBJECTIVE & OBJECTIVE
Interval History:  Able to sleep last night.  She reports that she is having difficulty bringing anything up with her cough; we will add guaifenesin as a mucolytic.  Cultures have been negative so far; will deescalate antibiotics.    Review of Systems   Respiratory:  Positive for cough and shortness of breath.    Musculoskeletal:  Positive for back pain.   Objective:     Vital Signs (Most Recent):  Temp: 97 °F (36.1 °C) (05/01/23 1149)  Pulse: (!) 57 (05/01/23 1212)  Resp: 17 (05/01/23 1155)  BP: (!) 103/54 (05/01/23 1149)  SpO2: 97 % (05/01/23 1155)   Vital Signs (24h Range):  Temp:  [96 °F (35.6 °C)-98.6 °F (37 °C)] 97 °F (36.1 °C)  Pulse:  [52-77] 57  Resp:  [16-20] 17  SpO2:  [91 %-97 %] 97 %  BP: ()/(54-68) 103/54     Weight: 105.2 kg (232 lb)  Body mass index is 38.61 kg/m².    Intake/Output Summary (Last 24 hours) at 5/1/2023 1252  Last data filed at 5/1/2023 0845  Gross per 24 hour   Intake 530 ml   Output 2200 ml   Net -1670 ml        Physical Exam  Vitals reviewed.   Constitutional:       General: She is not in acute distress.     Appearance: She is well-developed. She is ill-appearing. She is not diaphoretic.   HENT:      Head: Normocephalic and atraumatic.      Nose: Nose normal.   Eyes:      General: No scleral icterus.     Pupils: Pupils are equal, round, and reactive to light.   Neck:      Vascular: No JVD.      Trachea: No tracheal deviation.   Cardiovascular:      Rate and Rhythm: Normal rate and regular rhythm.      Heart sounds: Normal heart sounds.   Pulmonary:      Effort: Pulmonary effort is normal. No respiratory distress.      Breath sounds: Rales present.      Comments: On supplemental oxygen  Abdominal:      General: There is no distension.      Palpations: Abdomen is soft.      Tenderness: There is no abdominal tenderness.   Musculoskeletal:         General: No deformity.      Cervical back: Normal range of motion.      Right lower leg: Edema present.      Left lower leg: Edema  present.   Skin:     General: Skin is warm and dry.      Findings: No rash.   Neurological:      Mental Status: She is alert and oriented to person, place, and time.   Psychiatric:         Behavior: Behavior normal.       Significant Labs: All pertinent labs within the past 24 hours have been reviewed.    Significant Imaging: I have reviewed all pertinent imaging results/findings within the past 24 hours.

## 2023-05-01 NOTE — PLAN OF CARE
Continue OT POC. Patient with encouragement participatory in session emphasizing proper body alignment during sit to stands and progressive standing tolerance during grooming. Recommend post acute therapy upon medically stable.     Problem: Occupational Therapy  Goal: Occupational Therapy Goal  Description: Goals to be met by: 05/30/23     Patient will increase functional independence with ADLs by performing:    UE Dressing with Modified Early.  LE Dressing with Minimal Assistance and Assistive Devices as needed.  Grooming while EOB with Set-up Assistance.  Toileting from bedside commode with Minimal Assistance for hygiene and clothing management.   Toilet transfer to bedside commode with Stand-by Assistance.  Upper extremity exercise program 3x10 reps per handout, with supervision.    Outcome: Ongoing, Progressing

## 2023-05-01 NOTE — PT/OT/SLP PROGRESS
Physical Therapy Treatment    Patient Name:  Tasha Hawley   MRN:  [id number]    Recommendations:     Discharge Recommendations:  (post acute placement)  Discharge Equipment Recommendations: none  Barriers to discharge:  decreased mobility,strength and endurance    Assessment:     Tasha Hawley is a 67 y.o. female admitted with a medical diagnosis of Sepsis with encephalopathy and septic shock.  She presents with the following impairments/functional limitations: weakness, impaired endurance, impaired functional mobility, impaired balance, decreased lower extremity function, decreased ROM, impaired coordination, impaired skin, impaired cardiopulmonary response to activity,pt with good participation and requires assistance with ambulation for safety at this time,pt will benefit from post acute placement upon discharge.    Rehab Prognosis: Fair; patient would benefit from acute skilled PT services to address these deficits and reach maximum level of function.    Recent Surgery: * No surgery found *      Plan:     During this hospitalization, patient to be seen 5 x/week to address the identified rehab impairments via gait training, therapeutic exercises, therapeutic activities, neuromuscular re-education and progress toward the following goals:    Plan of Care Expires:  05/30/23    Subjective     Chief Complaint: n/a  Patient/Family Comments/goals: pt agreeable to rx.  Pain/Comfort:  Pain Rating 1:  (no c/o's)      Objective:     Communicated with nsg prior to session.  Patient found supine with bed alarm, oxygen, peripheral IV, meadows catheter, telemetry upon PT entry to room.     General Precautions: Standard, fall  Orthopedic Precautions: N/A  Braces: N/A  Respiratory Status: Nasal cannula, flow 4 L/min     Functional Mobility:  Bed Mobility:     Supine to Sit: supervision  Transfers:     Sit to Stand:  stand by assistance with rolling walker  Bed to Chair: minimum assistance with  rolling walker and O2 donned   using  ambulation  Gait: amb ~30' with RW and Min A with IV in tow and O2 donned and flexed posture  Balance: fair standing balance      AM-PAC 6 CLICK MOBILITY  Turning over in bed (including adjusting bedclothes, sheets and blankets)?: 3  Sitting down on and standing up from a chair with arms (e.g., wheelchair, bedside commode, etc.): 3  Moving from lying on back to sitting on the side of the bed?: 3  Moving to and from a bed to a chair (including a wheelchair)?: 3  Need to walk in hospital room?: 3  Climbing 3-5 steps with a railing?: 1  Basic Mobility Total Score: 16       Treatment & Education: increased time required with mobility.      Patient left up in chair with all lines intact, call button in reach, and nsg present..    GOALS:   Multidisciplinary Problems       Physical Therapy Goals          Problem: Physical Therapy    Goal Priority Disciplines Outcome Goal Variances Interventions   Physical Therapy Goal     PT, PT/OT Ongoing, Progressing     Description: Goals to be met by: 2023     Patient will increase functional independence with mobility by performin. Supine to sit with Stand-by Assistance  2. Sit to supine with Stand-by Assistance  3. Rolling to Left and Right with Stand-by Assistance.  4. Sit to stand transfer with Stand-by Assistance  5. Bed to chair transfer with Stand-by Assistance using Rolling Walker  6. Gait  x 100 feet with Supervision using Rolling Walker.                          Time Tracking:     PT Received On: 23  PT Start Time: 852     PT Stop Time: 923  PT Total Time (min): 31 min     Billable Minutes: Gait Training 16 and Therapeutic Activity 15    Treatment Type: Treatment  PT/PTA: PTA     Number of PTA visits since last PT visit: 2023

## 2023-05-01 NOTE — PT/OT/SLP PROGRESS
"Occupational Therapy   Treatment    Name: Tasha Hawley  MRN: [id number]  Admitting Diagnosis:  Sepsis with encephalopathy and septic shock       Recommendations:     Discharge Recommendations: other (see comments) (post acute therapy 2/2 multiple falls)  Discharge Equipment Recommendations:  other (see comments) (defer to next level of care)  Barriers to discharge:  Other (Comment) (repeated falls)    Assessment:     Tasha Hawley is a 67 y.o. female with a medical diagnosis of Sepsis with encephalopathy and septic shock.  She presents with ..The primary encounter diagnosis was Pneumonia of both lungs due to infectious organism, unspecified part of lung. Diagnoses of Shortness of breath, Acute respiratory failure with hypoxia and hypercarbia, and Chest pressure were also pertinent to this visit.  . Performance deficits affecting function are weakness, impaired endurance, impaired sensation, impaired cognition, edema, pain, impaired balance, impaired cardiopulmonary response to activity, gait instability, decreased safety awareness, decreased lower extremity function, impaired functional mobility, impaired skin, impaired self care skills.     Continue OT POC. Patient with encouragement participatory in session emphasizing proper body alignment during sit to stands and progressive standing tolerance during grooming. Recommend post acute therapy upon medically stable.     Rehab Prognosis:  Good; patient would benefit from acute skilled OT services to address these deficits and reach maximum level of function.       Plan:     Patient to be seen 3 x/week to address the above listed problems via self-care/home management, therapeutic activities, therapeutic exercises  Plan of Care Expires: 05/30/23  Plan of Care Reviewed with: patient    Subjective     Chief Complaint: "I am tired from walking earlier with physical therapy"  Patient/Family Comments/goals: Patient reports desire to brush her " teeth  Pain/Comfort:  Pain Rating 1: 8/10 (low back)  Pain Addressed 1: Reposition, Cessation of Activity, Nurse notified, Pre-medicate for activity  Pain Rating Post-Intervention 1: 8/10 (low back)    Objective:     Communicated with: nursing prior to session.  Patient found up in chair with peripheral IV, meadows catheter, telemetry, oxygen upon OT entry to room.    General Precautions: Standard, fall    Orthopedic Precautions:N/A  Braces: N/A  Respiratory Status: Nasal cannula, flow 5 L/min     Occupational Performance:       Functional Mobility/Transfers:  Patient completed Sit <> Stand Transfer with rolling walker x3 trials  with  CGA on trial 1, followed by task breakdown instructions by OT with emphasis on proper hand position on chair and trunk forward flexion (nose over toes), with progression on addition to STS with SBA on trial 2 and 3.   Functional Mobility: standing with unilateral to BUE support x~4 minutes with SBA, (unable to maintain without at least one UE for support on table during initial elements of grooming tasks). Min tactile/ verbal cues for engaging improved standing upright but limited reciprocation noted.    Activities of Daily Living:  Grooming: set up assist; moderate encouragment to foster patient's performance in standing. Engaged patient with fair reciprocation for ADL modifications such as incorporating pursed lip breathing. Using method of standing with chair behind self to allow for seated rest breaks as needed.      [facility] 6 Click ADL: 16    Treatment & Education:  Patient reporting fatigue, but agreeable to participate. SpO2 throughout session maintained at 93% or higher on 5L of O2. Attempts for incorporation of Modifed YONNY exertion scale but decreased understanding. Educated/instructed on proper hand placement on chair armrests and on walker to avoid increasing low back discomfort. Therapist interviewed patient on self-efficacy on transfer on / off commode with staff assist;  upon interview patient describing posture and thus therapist adjusted height (no transfer performed). Instructed patient in 3x sit to stands, standing tolerance, and grooming tasks as above. Increased fatigue complaints noted and thus session terminated. Returned to chair.    Patient left up in chair with all lines intact, call button in reach, and nursing notified    GOALS:   Multidisciplinary Problems       Occupational Therapy Goals          Problem: Occupational Therapy    Goal Priority Disciplines Outcome Interventions   Occupational Therapy Goal     OT, PT/OT Ongoing, Progressing    Description: Goals to be met by: 05/30/23     Patient will increase functional independence with ADLs by performing:    UE Dressing with Modified Lucas.  LE Dressing with Minimal Assistance and Assistive Devices as needed.  Grooming while EOB with Set-up Assistance.  Toileting from bedside commode with Minimal Assistance for hygiene and clothing management.   Toilet transfer to bedside commode with Stand-by Assistance.  Upper extremity exercise program 3x10 reps per handout, with supervision.                         Time Tracking:     OT Date of Treatment: 05/01/23  OT Start Time: 1050  OT Stop Time: 1114  OT Total Time (min): 24 min    Billable Minutes:Self Care/Home Management 8 min  Therapeutic Activity 16 min    OT/SAMANTHA: OT          5/1/2023

## 2023-05-01 NOTE — PLAN OF CARE
SW met with pt at bedside to discuss dc planning. Pt expressed that she is only agreeable to go to [facility]. Pt expressed that if [facility] is not willing to accept her she will dc home with HH. SW expressed understanding. SW sent referral via careport to [facility].     SW will continue to follow pt throughout her transitions of care and assist with any dc needs.     Future Appointments   Date Time Provider Department Center   5/4/2023 11:00 AM Norma Pearson MD [facility] ENDODIA [facility]   5/9/2023  1:40 PM Brent Ceballos MD [facility] CARDIO [place]   5/12/2023  1:00 PM Cami Romero MD [facility] PULMSVC [facility]   5/17/2023  4:00 PM Prince Vance MD [facility] GASTRO [facility]   7/7/2023 11:00 AM Mesfin Hodges II, MD [facility] IM [facility] PCW        05/01/23 1217   Post-Acute Status   Post-Acute Authorization Placement   Post-Acute Placement Status Referrals Sent

## 2023-05-01 NOTE — ASSESSMENT & PLAN NOTE
-chest x-ray concerning for multifocal pneumonia   -initiated on broad-spectrum antibiotics   -respiratory sputum cultures and blood cultures no growth to date; de-escalate to ceftriaxone/doxy  -see septic shock

## 2023-05-02 PROCEDURE — 97116 GAIT TRAINING THERAPY: CPT | Mod: HCNC,CQ

## 2023-05-02 PROCEDURE — 94799 UNLISTED PULMONARY SVC/PX: CPT | Mod: HCNC

## 2023-05-02 PROCEDURE — A4216 STERILE WATER/SALINE, 10 ML: HCPCS | Mod: HCNC | Performed by: HOSPITALIST

## 2023-05-02 PROCEDURE — 25000003 PHARM REV CODE 250: Mod: HCNC | Performed by: HOSPITALIST

## 2023-05-02 PROCEDURE — 94640 AIRWAY INHALATION TREATMENT: CPT | Mod: HCNC

## 2023-05-02 PROCEDURE — 99900035 HC TECH TIME PER 15 MIN (STAT): Mod: HCNC

## 2023-05-02 PROCEDURE — 94660 CPAP INITIATION&MGMT: CPT | Mod: HCNC

## 2023-05-02 PROCEDURE — 63600175 PHARM REV CODE 636 W HCPCS: Mod: HCNC | Performed by: HOSPITALIST

## 2023-05-02 PROCEDURE — 63600175 PHARM REV CODE 636 W HCPCS: Mod: HCNC | Performed by: STUDENT IN AN ORGANIZED HEALTH CARE EDUCATION/TRAINING PROGRAM

## 2023-05-02 PROCEDURE — 94761 N-INVAS EAR/PLS OXIMETRY MLT: CPT | Mod: HCNC

## 2023-05-02 PROCEDURE — 27100171 HC OXYGEN HIGH FLOW UP TO 24 HOURS: Mod: HCNC

## 2023-05-02 PROCEDURE — 11000001 HC ACUTE MED/SURG PRIVATE ROOM: Mod: HCNC

## 2023-05-02 PROCEDURE — 97110 THERAPEUTIC EXERCISES: CPT | Mod: HCNC,CQ

## 2023-05-02 RX ORDER — IPRATROPIUM BROMIDE AND ALBUTEROL SULFATE 2.5; .5 MG/3ML; MG/3ML
3 SOLUTION RESPIRATORY (INHALATION) EVERY 6 HOURS PRN
Status: DISCONTINUED | OUTPATIENT
Start: 2023-05-02 | End: 2023-05-05 | Stop reason: HOSPADM

## 2023-05-02 RX ORDER — FUROSEMIDE 10 MG/ML
20 INJECTION INTRAMUSCULAR; INTRAVENOUS ONCE
Status: COMPLETED | OUTPATIENT
Start: 2023-05-02 | End: 2023-05-02

## 2023-05-02 RX ORDER — KETOROLAC TROMETHAMINE 30 MG/ML
15 INJECTION, SOLUTION INTRAMUSCULAR; INTRAVENOUS ONCE
Status: COMPLETED | OUTPATIENT
Start: 2023-05-02 | End: 2023-05-02

## 2023-05-02 RX ADMIN — ENOXAPARIN SODIUM 40 MG: 40 INJECTION SUBCUTANEOUS at 04:05

## 2023-05-02 RX ADMIN — MUPIROCIN: 20 OINTMENT TOPICAL at 09:05

## 2023-05-02 RX ADMIN — HYDROCODONE BITARTRATE AND ACETAMINOPHEN 1 TABLET: 10; 325 TABLET ORAL at 06:05

## 2023-05-02 RX ADMIN — CYANOCOBALAMIN TAB 1000 MCG 1000 MCG: 1000 TAB at 09:05

## 2023-05-02 RX ADMIN — Medication 10 ML: at 06:05

## 2023-05-02 RX ADMIN — FLUDROCORTISONE ACETATE 100 MCG: 0.1 TABLET ORAL at 09:05

## 2023-05-02 RX ADMIN — HYDROCODONE BITARTRATE AND ACETAMINOPHEN 1 TABLET: 10; 325 TABLET ORAL at 09:05

## 2023-05-02 RX ADMIN — FUROSEMIDE 20 MG: 10 INJECTION, SOLUTION INTRAMUSCULAR; INTRAVENOUS at 11:05

## 2023-05-02 RX ADMIN — OXYBUTYNIN CHLORIDE 10 MG: 5 TABLET, EXTENDED RELEASE ORAL at 09:05

## 2023-05-02 RX ADMIN — TIOTROPIUM BROMIDE INHALATION SPRAY 2 PUFF: 3.12 SPRAY, METERED RESPIRATORY (INHALATION) at 09:05

## 2023-05-02 RX ADMIN — Medication 10 ML: at 09:05

## 2023-05-02 RX ADMIN — FLUTICASONE PROPIONATE 100 MCG: 50 SPRAY, METERED NASAL at 09:05

## 2023-05-02 RX ADMIN — HYDROCORTISONE SODIUM SUCCINATE 50 MG: 100 INJECTION, POWDER, FOR SOLUTION INTRAMUSCULAR; INTRAVENOUS at 09:05

## 2023-05-02 RX ADMIN — QUETIAPINE FUMARATE 200 MG: 100 TABLET ORAL at 09:05

## 2023-05-02 RX ADMIN — CEFTRIAXONE 1 G: 1 INJECTION, POWDER, FOR SOLUTION INTRAMUSCULAR; INTRAVENOUS at 09:05

## 2023-05-02 RX ADMIN — LIDOCAINE 1 PATCH: 50 PATCH CUTANEOUS at 09:05

## 2023-05-02 RX ADMIN — DOXYCYCLINE HYCLATE 100 MG: 100 TABLET, COATED ORAL at 09:05

## 2023-05-02 RX ADMIN — KETOROLAC TROMETHAMINE 15 MG: 30 INJECTION, SOLUTION INTRAMUSCULAR; INTRAVENOUS at 11:05

## 2023-05-02 RX ADMIN — HYDROCODONE BITARTRATE AND ACETAMINOPHEN 1 TABLET: 10; 325 TABLET ORAL at 01:05

## 2023-05-02 RX ADMIN — LEVOTHYROXINE SODIUM 150 MCG: 75 TABLET ORAL at 06:05

## 2023-05-02 RX ADMIN — PANTOPRAZOLE SODIUM 40 MG: 40 TABLET, DELAYED RELEASE ORAL at 09:05

## 2023-05-02 RX ADMIN — FLUOXETINE HYDROCHLORIDE 60 MG: 20 CAPSULE ORAL at 09:05

## 2023-05-02 RX ADMIN — HYDROXYZINE HYDROCHLORIDE 50 MG: 25 TABLET, FILM COATED ORAL at 01:05

## 2023-05-02 RX ADMIN — LIOTHYRONINE SODIUM 25 MCG: 25 TABLET ORAL at 09:05

## 2023-05-02 RX ADMIN — HYDROXYZINE HYDROCHLORIDE 50 MG: 25 TABLET, FILM COATED ORAL at 06:05

## 2023-05-02 RX ADMIN — ATORVASTATIN CALCIUM 80 MG: 40 TABLET, FILM COATED ORAL at 09:05

## 2023-05-02 RX ADMIN — POTASSIUM CHLORIDE 20 MEQ: 1500 TABLET, EXTENDED RELEASE ORAL at 09:05

## 2023-05-02 RX ADMIN — TRAZODONE HYDROCHLORIDE 300 MG: 100 TABLET ORAL at 09:05

## 2023-05-02 RX ADMIN — ASPIRIN 81 MG: 81 TABLET, COATED ORAL at 09:05

## 2023-05-02 NOTE — PLAN OF CARE
[facility] reviewing pt at this time. Pt expressed that if she is not accepted by [facility] she will dc home with HH. SW expressed understanding. SW will continue to follow pt throughout her transitions of care and assist with any dc needs.     Future Appointments   Date Time Provider Department Center   5/4/2023 11:00 AM Norma Pearson MD [facility] ENDODIA [facility]   5/9/2023  1:40 PM Brent Ceballos MD [facility] CARDIO [place]   5/12/2023  1:00 PM Cami Romero MD [facility] PULMSVC [facility]   5/17/2023  4:00 PM Prince Vance MD [facility] GASTRO [facility]   7/7/2023 11:00 AM Mesfin Hodges II, MD [facility] IM [facility] PCW        05/02/23 1326   Post-Acute Status   Post-Acute Authorization Placement

## 2023-05-02 NOTE — PT/OT/SLP PROGRESS
Physical Therapy Treatment    Patient Name:  Tahsa Hawley   MRN:  [id number]    Recommendations:     Discharge Recommendations:  (post acute placement)  Discharge Equipment Recommendations: none  Barriers to discharge:  decreased mobility,strength and endurance    Assessment:     Tasha Hawley is a 67 y.o. female admitted with a medical diagnosis of Sepsis with encephalopathy and septic shock.  She presents with the following impairments/functional limitations: weakness, impaired endurance, impaired functional mobility, gait instability, impaired balance, decreased lower extremity function, decreased ROM, impaired coordination, impaired skin, impaired cardiopulmonary response to activity,pt with good participation and requires assistance with gait at this time for safety concerns,pt will benefit from post acute placement upon discharge.    Rehab Prognosis: Fair; patient would benefit from acute skilled PT services to address these deficits and reach maximum level of function.    Recent Surgery: * No surgery found *      Plan:     During this hospitalization, patient to be seen 5 x/week to address the identified rehab impairments via gait training, therapeutic activities, therapeutic exercises, neuromuscular re-education and progress toward the following goals:    Plan of Care Expires:  05/30/23    Subjective     Chief Complaint: n/a  Patient/Family Comments/goals: pt agreeable to rx.  Pain/Comfort:  Pain Rating 1: 0/10      Objective:     Communicated with nsg prior to session.  Patient found up in chair with meadows catheter, oxygen, peripheral IV, telemetry, pressure relief boots upon PT entry to room.     General Precautions: Standard, fall  Orthopedic Precautions: N/A  Braces: N/A  Respiratory Status:  supplemental O2     Functional Mobility:  Transfers:     Sit to Stand:  contact guard assistance and minimum assistance with rolling walker  Gait: amb ~38' with RW and CGA/Min A with flexed posture and O2  donned  Balance: fair standing balance with RW      AM-PAC 6 CLICK MOBILITY  Turning over in bed (including adjusting bedclothes, sheets and blankets)?: 3  Sitting down on and standing up from a chair with arms (e.g., wheelchair, bedside commode, etc.): 3  Moving from lying on back to sitting on the side of the bed?: 3  Moving to and from a bed to a chair (including a wheelchair)?: 3  Need to walk in hospital room?: 3  Climbing 3-5 steps with a railing?: 1  Basic Mobility Total Score: 16       Treatment & Education: le seated ex's X 10 reps inc: ap,laq's and hip flex.      Patient left up in chair with all lines intact and call button in reach..    GOALS: see general POC  Multidisciplinary Problems       Physical Therapy Goals          Problem: Physical Therapy    Goal Priority Disciplines Outcome Goal Variances Interventions   Physical Therapy Goal     PT, PT/OT Ongoing, Progressing     Description: Goals to be met by: 2023     Patient will increase functional independence with mobility by performin. Supine to sit with Stand-by Assistance  2. Sit to supine with Stand-by Assistance  3. Rolling to Left and Right with Stand-by Assistance.  4. Sit to stand transfer with Stand-by Assistance  5. Bed to chair transfer with Stand-by Assistance using Rolling Walker  6. Gait  x 100 feet with Supervision using Rolling Walker.                          Time Tracking:     PT Received On: 23  PT Start Time: 1332     PT Stop Time: 1356  PT Total Time (min): 24 min     Billable Minutes: Gait Training 14 and Therapeutic Exercise 10    Treatment Type: Treatment  PT/PTA: PTA     Number of PTA visits since last PT visit: 2     2023

## 2023-05-02 NOTE — PROGRESS NOTES
[facility] Medicine  Progress Note    Patient Name: Tasha Hawley  MRN: [id number]  Patient Class: IP- Inpatient   Admission Date: 4/28/2023  Length of Stay: 4 days  Attending Physician: Bharti Sullivan MD  Primary Care Provider: Mesfin Hodges Ii, MD        Subjective:     Principal Problem:Sepsis with encephalopathy and septic shock      HPI:  Ms. Hawley is a 68yo woman with adrenal insufficiency, chronic pain on dilaudid intrathecal pump, sleep apnea, hypothyroidism, lymphedema, suprapubic catheter who presents with several days of shortness of breath.  States that over the past few days she has been having worsening shortness of breath along with productive cough.  Denies any fevers or chills, although she does have a recorded fever upon admission here.  She does use supplemental oxygen at home and usually uses CPAP device.  She also states that she has had some chest pain that started yesterday.  Worse with deep inspiration.  She notes bilateral lower extremity swelling as well.      Overview/Hospital Course:  No notes on file    Interval History:  Patient reported feeling chest tightness and difficulty breathing last night that resolved on its own.  Remains on 4 L oxygen via NC.  P.r.n. breathing treatments ordered and encouraged to use them.  Otherwise denies any new complaints.  Requesting for Toradol today for pain.    Main campus called - specimen was urine legionella lost.     Review of Systems   Respiratory:  Positive for cough and shortness of breath.    Musculoskeletal:  Positive for back pain.   Objective:     Vital Signs (Most Recent):  Temp: 96.6 °F (35.9 °C) (05/02/23 1557)  Pulse: (!) 56 (05/02/23 1557)  Resp: 18 (05/02/23 1557)  BP: (!) 102/55 (05/02/23 1557)  SpO2: 99 % (05/02/23 1557)   Vital Signs (24h Range):  Temp:  [96.5 °F (35.8 °C)-98.1 °F (36.7 °C)] 96.6 °F (35.9 °C)  Pulse:  [54-72] 56  Resp:  [16-18] 18  SpO2:  [90 %-99 %] 99 %  BP: ()/(41-78) 102/55      Weight: 105.2 kg (232 lb)  Body mass index is 38.61 kg/m².    Intake/Output Summary (Last 24 hours) at 5/2/2023 1605  Last data filed at 5/2/2023 1100  Gross per 24 hour   Intake 730 ml   Output 2600 ml   Net -1870 ml        Physical Exam  Vitals reviewed.   Constitutional:       General: She is not in acute distress.     Appearance: She is well-developed. She is ill-appearing. She is not diaphoretic.   HENT:      Head: Normocephalic and atraumatic.      Nose: Nose normal.   Eyes:      General: No scleral icterus.     Pupils: Pupils are equal, round, and reactive to light.   Neck:      Vascular: No JVD.      Trachea: No tracheal deviation.   Cardiovascular:      Rate and Rhythm: Normal rate and regular rhythm.      Heart sounds: Normal heart sounds.   Pulmonary:      Effort: Pulmonary effort is normal. No respiratory distress.      Breath sounds: Rales present.      Comments: On supplemental oxygen  Abdominal:      General: There is no distension.      Palpations: Abdomen is soft.      Tenderness: There is no abdominal tenderness.   Musculoskeletal:         General: No deformity.      Cervical back: Normal range of motion.      Right lower leg: Edema present.      Left lower leg: Edema present.   Skin:     General: Skin is warm and dry.      Findings: No rash.   Neurological:      Mental Status: She is alert and oriented to person, place, and time.   Psychiatric:         Behavior: Behavior normal.       Significant Labs: All pertinent labs within the past 24 hours have been reviewed.    Significant Imaging: I have reviewed all pertinent imaging results/findings within the past 24 hours.      Assessment/Plan:      * Sepsis with encephalopathy and septic shock  -presents with septic shock presumably due to multifocal pneumonia; also likely component of adrenal insufficiency  -mild encephalopathy, lethargy; now resolved  -fluid resuscitated in the ED without appropriate response   -initiated on Levophed, now  discontinued  -stress dose steroids, weaning back to home doses  -blood cultures collected in the ED   -initial lactic acid 2.6; has improved upon reassessment  -initially on broad-spectrum antibiotics; she has grown ESBL and MSSA in the past - now de-escalated to ceftriaxone, doxycycline  -stepped out of ICU 4/29    Multifocal pneumonia  -chest x-ray concerning for multifocal pneumonia   -initiated on broad-spectrum antibiotics   -respiratory sputum cultures and blood cultures no growth to date; de-escalate to ceftriaxone/doxy  -see septic shock      Acute respiratory failure with hypoxia and hypercarbia  Patient with Hypercapnic and Hypoxic Respiratory failure which is Acute on chronic.  she is on home oxygen at 2 LPM. Supplemental oxygen was provided and noted- Oxygen Concentration (%):  [35] 35    .   Signs/symptoms of respiratory failure include- respiratory distress and lethargy. Contributing diagnoses includes - Pneumonia Labs and images were reviewed. Patient Has recent ABG, which has been reviewed. Will treat underlying causes and adjust management of respiratory failure as follows-     -treat pneumonia  -continue home inhaler regimen  -incentive spirometry  -nightly CPAP  -IV lasix daily to maintain euvolemia    Adrenal insufficiency  -stress dose IV hydrocortisone 50mg q6 initially, will titrate down towards home PO dose      History of stroke with residual deficit        Debility  -PT/OT following, recommend SNF      Chronic diastolic heart failure  -BNP within normal range; does have some leg swelling but this is likely secondary to her lymphedema      Mixed stress and urge urinary incontinence  -oxybutynin      Suprapubic catheter  -in place   -UA not consistent with infection      Insomnia due to medical condition  -continue home trazodone      S/P insertion of intrathecal pump  -noted      S/P insertion of spinal cord stimulator  -noted      Lymphedema of both lower extremities  -noted   -IV Lasix  given at admission, continue wrapping   -wound care      Primary hypothyroidism  -resume home thyroid medication   -TSH within normal limits      Neurogenic bladder  -continue home oxybutynin      Coronary artery disease of native artery with stable angina pectoris  -continue home aspirin and statin      Narcotic dependency, continuous  -on intrathecal pain pump with breakthrough Norco      Chronic pain syndrome  -noted   -on intrathecal pain pump with p.r.n. hydrocodone      Major depressive disorder, recurrent, mild  Patient has recurrent depression which is mild and is currently controlled. Will Continue anti-depressant medications. We will not consult psychiatry at this time. Patient does not display psychosis at this time. Continue to monitor closely and adjust plan of care as needed.        Sleep apnea  -resume home CPAP      Generalized anxiety disorder  -continue home meds  -prn low dose ativan      VTE Risk Mitigation (From admission, onward)         Ordered     enoxaparin injection 40 mg  Daily         04/28/23 1115     IP VTE HIGH RISK PATIENT  Once         04/28/23 1115     Place sequential compression device  Until discontinued         04/28/23 1115                Discharge Planning   JACKIE:      Code Status: Full Code   Is the patient medically ready for discharge?:     Reason for patient still in hospital (select all that apply): Patient trending condition, Treatment and Pending disposition  Discharge Plan A: Home with family, Home Health              Bharti Sullivan MD  Department of Hospital Medicine   [place] [facility]

## 2023-05-02 NOTE — ASSESSMENT & PLAN NOTE
-presents with septic shock presumably due to multifocal pneumonia; also likely component of adrenal insufficiency  -mild encephalopathy, lethargy; now resolved  -fluid resuscitated in the ED without appropriate response   -initiated on Levophed, now discontinued  -stress dose steroids, weaning back to home doses  -blood cultures collected in the ED   -initial lactic acid 2.6; has improved upon reassessment  -initially on broad-spectrum antibiotics; she has grown ESBL and MSSA in the past - now de-escalated to ceftriaxone, doxycycline  -stepped out of ICU 4/29

## 2023-05-02 NOTE — SUBJECTIVE & OBJECTIVE
Interval History:  Patient reported feeling chest tightness and difficulty breathing last night that resolved on its own.  Remains on 4 L oxygen via NC.  P.r.n. breathing treatments ordered and encouraged to use them.  Otherwise denies any new complaints.  Requesting for Toradol today for pain.    Main campus called - specimen was urine legionella lost.     Review of Systems   Respiratory:  Positive for cough and shortness of breath.    Musculoskeletal:  Positive for back pain.   Objective:     Vital Signs (Most Recent):  Temp: 96.6 °F (35.9 °C) (05/02/23 1557)  Pulse: (!) 56 (05/02/23 1557)  Resp: 18 (05/02/23 1557)  BP: (!) 102/55 (05/02/23 1557)  SpO2: 99 % (05/02/23 1557)   Vital Signs (24h Range):  Temp:  [96.5 °F (35.8 °C)-98.1 °F (36.7 °C)] 96.6 °F (35.9 °C)  Pulse:  [54-72] 56  Resp:  [16-18] 18  SpO2:  [90 %-99 %] 99 %  BP: ()/(41-78) 102/55     Weight: 105.2 kg (232 lb)  Body mass index is 38.61 kg/m².    Intake/Output Summary (Last 24 hours) at 5/2/2023 1605  Last data filed at 5/2/2023 1100  Gross per 24 hour   Intake 730 ml   Output 2600 ml   Net -1870 ml        Physical Exam  Vitals reviewed.   Constitutional:       General: She is not in acute distress.     Appearance: She is well-developed. She is ill-appearing. She is not diaphoretic.   HENT:      Head: Normocephalic and atraumatic.      Nose: Nose normal.   Eyes:      General: No scleral icterus.     Pupils: Pupils are equal, round, and reactive to light.   Neck:      Vascular: No JVD.      Trachea: No tracheal deviation.   Cardiovascular:      Rate and Rhythm: Normal rate and regular rhythm.      Heart sounds: Normal heart sounds.   Pulmonary:      Effort: Pulmonary effort is normal. No respiratory distress.      Breath sounds: Rales present.      Comments: On supplemental oxygen  Abdominal:      General: There is no distension.      Palpations: Abdomen is soft.      Tenderness: There is no abdominal tenderness.   Musculoskeletal:          General: No deformity.      Cervical back: Normal range of motion.      Right lower leg: Edema present.      Left lower leg: Edema present.   Skin:     General: Skin is warm and dry.      Findings: No rash.   Neurological:      Mental Status: She is alert and oriented to person, place, and time.   Psychiatric:         Behavior: Behavior normal.       Significant Labs: All pertinent labs within the past 24 hours have been reviewed.    Significant Imaging: I have reviewed all pertinent imaging results/findings within the past 24 hours.

## 2023-05-02 NOTE — PLAN OF CARE
Problem: Adult Inpatient Plan of Care  Goal: Plan of Care Review  Outcome: Ongoing, Progressing  Goal: Patient-Specific Goal (Individualized)  Outcome: Ongoing, Progressing  Goal: Optimal Comfort and Wellbeing  Outcome: Ongoing, Progressing  Goal: Readiness for Transition of Care  Outcome: Ongoing, Progressing     Problem: Fluid Imbalance (Pneumonia)  Goal: Fluid Balance  Outcome: Ongoing, Progressing     Problem: Infection (Pneumonia)  Goal: Resolution of Infection Signs and Symptoms  Outcome: Ongoing, Progressing     Problem: Respiratory Compromise (Pneumonia)  Goal: Effective Oxygenation and Ventilation  Outcome: Ongoing, Progressing     Problem: Fall Injury Risk  Goal: Absence of Fall and Fall-Related Injury  Outcome: Ongoing, Progressing     Problem: Skin Injury Risk Increased  Goal: Skin Health and Integrity  Outcome: Ongoing, Progressing     Problem: Anxiety  Goal: Anxiety Reduction or Resolution  Outcome: Ongoing, Progressing     Problem: Obstructive Sleep Apnea Risk or Actual Comorbidity Management  Goal: Unobstructed Breathing During Sleep  Outcome: Ongoing, Progressing     Problem: Pain Chronic (Persistent) (Comorbidity Management)  Goal: Acceptable Pain Control and Functional Ability  Outcome: Ongoing, Progressing

## 2023-05-03 LAB
ANION GAP SERPL CALC-SCNC: 8 MMOL/L (ref 8–16)
BACTERIA BLD CULT: NORMAL
BACTERIA BLD CULT: NORMAL
BASOPHILS # BLD AUTO: 0.01 K/UL (ref 0–0.2)
BASOPHILS NFR BLD: 0.1 % (ref 0–1.9)
BUN SERPL-MCNC: 12 MG/DL (ref 8–23)
CALCIUM SERPL-MCNC: 9.1 MG/DL (ref 8.7–10.5)
CHLORIDE SERPL-SCNC: 98 MMOL/L (ref 95–110)
CO2 SERPL-SCNC: 35 MMOL/L (ref 23–29)
CREAT SERPL-MCNC: 0.7 MG/DL (ref 0.5–1.4)
DIFFERENTIAL METHOD: ABNORMAL
EOSINOPHIL # BLD AUTO: 0.1 K/UL (ref 0–0.5)
EOSINOPHIL NFR BLD: 1.3 % (ref 0–8)
ERYTHROCYTE [DISTWIDTH] IN BLOOD BY AUTOMATED COUNT: 14.2 % (ref 11.5–14.5)
EST. GFR  (NO RACE VARIABLE): >60 ML/MIN/1.73 M^2
GLUCOSE SERPL-MCNC: 107 MG/DL (ref 70–110)
HCT VFR BLD AUTO: 28.2 % (ref 37–48.5)
HGB BLD-MCNC: 8.5 G/DL (ref 12–16)
IMM GRANULOCYTES # BLD AUTO: 0.04 K/UL (ref 0–0.04)
IMM GRANULOCYTES NFR BLD AUTO: 0.5 % (ref 0–0.5)
LYMPHOCYTES # BLD AUTO: 1 K/UL (ref 1–4.8)
LYMPHOCYTES NFR BLD: 13.4 % (ref 18–48)
MCH RBC QN AUTO: 26.1 PG (ref 27–31)
MCHC RBC AUTO-ENTMCNC: 30.1 G/DL (ref 32–36)
MCV RBC AUTO: 87 FL (ref 82–98)
MONOCYTES # BLD AUTO: 0.6 K/UL (ref 0.3–1)
MONOCYTES NFR BLD: 7.6 % (ref 4–15)
NEUTROPHILS # BLD AUTO: 5.8 K/UL (ref 1.8–7.7)
NEUTROPHILS NFR BLD: 77.1 % (ref 38–73)
NRBC BLD-RTO: 0 /100 WBC
PLATELET # BLD AUTO: 260 K/UL (ref 150–450)
PMV BLD AUTO: 9.5 FL (ref 9.2–12.9)
POTASSIUM SERPL-SCNC: 4.5 MMOL/L (ref 3.5–5.1)
RBC # BLD AUTO: 3.26 M/UL (ref 4–5.4)
SODIUM SERPL-SCNC: 141 MMOL/L (ref 136–145)
WBC # BLD AUTO: 7.52 K/UL (ref 3.9–12.7)

## 2023-05-03 PROCEDURE — 25000242 PHARM REV CODE 250 ALT 637 W/ HCPCS: Mod: HCNC | Performed by: STUDENT IN AN ORGANIZED HEALTH CARE EDUCATION/TRAINING PROGRAM

## 2023-05-03 PROCEDURE — 85025 COMPLETE CBC W/AUTO DIFF WBC: CPT | Mod: HCNC | Performed by: HOSPITALIST

## 2023-05-03 PROCEDURE — 27000221 HC OXYGEN, UP TO 24 HOURS: Mod: HCNC

## 2023-05-03 PROCEDURE — 97116 GAIT TRAINING THERAPY: CPT | Mod: HCNC,CQ

## 2023-05-03 PROCEDURE — 63600175 PHARM REV CODE 636 W HCPCS: Mod: HCNC | Performed by: STUDENT IN AN ORGANIZED HEALTH CARE EDUCATION/TRAINING PROGRAM

## 2023-05-03 PROCEDURE — 97530 THERAPEUTIC ACTIVITIES: CPT | Mod: HCNC,CO

## 2023-05-03 PROCEDURE — 94640 AIRWAY INHALATION TREATMENT: CPT | Mod: HCNC

## 2023-05-03 PROCEDURE — 63600175 PHARM REV CODE 636 W HCPCS: Mod: HCNC | Performed by: HOSPITALIST

## 2023-05-03 PROCEDURE — 80048 BASIC METABOLIC PNL TOTAL CA: CPT | Mod: HCNC | Performed by: HOSPITALIST

## 2023-05-03 PROCEDURE — 36415 COLL VENOUS BLD VENIPUNCTURE: CPT | Mod: HCNC | Performed by: HOSPITALIST

## 2023-05-03 PROCEDURE — 97535 SELF CARE MNGMENT TRAINING: CPT | Mod: HCNC,CO

## 2023-05-03 PROCEDURE — 25000003 PHARM REV CODE 250: Mod: HCNC | Performed by: STUDENT IN AN ORGANIZED HEALTH CARE EDUCATION/TRAINING PROGRAM

## 2023-05-03 PROCEDURE — 94761 N-INVAS EAR/PLS OXIMETRY MLT: CPT | Mod: HCNC

## 2023-05-03 PROCEDURE — 99900035 HC TECH TIME PER 15 MIN (STAT): Mod: HCNC

## 2023-05-03 PROCEDURE — 25000003 PHARM REV CODE 250: Mod: HCNC | Performed by: HOSPITALIST

## 2023-05-03 PROCEDURE — 97110 THERAPEUTIC EXERCISES: CPT | Mod: HCNC,CQ

## 2023-05-03 PROCEDURE — 94799 UNLISTED PULMONARY SVC/PX: CPT | Mod: HCNC

## 2023-05-03 PROCEDURE — A4216 STERILE WATER/SALINE, 10 ML: HCPCS | Mod: HCNC | Performed by: HOSPITALIST

## 2023-05-03 PROCEDURE — 11000001 HC ACUTE MED/SURG PRIVATE ROOM: Mod: HCNC

## 2023-05-03 RX ORDER — KETOROLAC TROMETHAMINE 30 MG/ML
15 INJECTION, SOLUTION INTRAMUSCULAR; INTRAVENOUS ONCE
Status: COMPLETED | OUTPATIENT
Start: 2023-05-03 | End: 2023-05-03

## 2023-05-03 RX ORDER — MAG HYDROX/ALUMINUM HYD/SIMETH 200-200-20
30 SUSPENSION, ORAL (FINAL DOSE FORM) ORAL EVERY 6 HOURS PRN
Status: DISCONTINUED | OUTPATIENT
Start: 2023-05-03 | End: 2023-05-05 | Stop reason: HOSPADM

## 2023-05-03 RX ORDER — IPRATROPIUM BROMIDE AND ALBUTEROL SULFATE 2.5; .5 MG/3ML; MG/3ML
3 SOLUTION RESPIRATORY (INHALATION) EVERY 6 HOURS
Status: DISCONTINUED | OUTPATIENT
Start: 2023-05-03 | End: 2023-05-05 | Stop reason: HOSPADM

## 2023-05-03 RX ORDER — FLUDROCORTISONE ACETATE 0.1 MG/1
100 TABLET ORAL DAILY
Status: COMPLETED | OUTPATIENT
Start: 2023-05-03 | End: 2023-05-04

## 2023-05-03 RX ORDER — CALCIUM CARBONATE 200(500)MG
500 TABLET,CHEWABLE ORAL 3 TIMES DAILY PRN
Status: DISCONTINUED | OUTPATIENT
Start: 2023-05-03 | End: 2023-05-03

## 2023-05-03 RX ADMIN — HYDROCORTISONE SODIUM SUCCINATE 50 MG: 100 INJECTION, POWDER, FOR SOLUTION INTRAMUSCULAR; INTRAVENOUS at 10:05

## 2023-05-03 RX ADMIN — IPRATROPIUM BROMIDE AND ALBUTEROL SULFATE 3 ML: .5; 3 SOLUTION RESPIRATORY (INHALATION) at 07:05

## 2023-05-03 RX ADMIN — ENOXAPARIN SODIUM 40 MG: 40 INJECTION SUBCUTANEOUS at 04:05

## 2023-05-03 RX ADMIN — PANTOPRAZOLE SODIUM 40 MG: 40 TABLET, DELAYED RELEASE ORAL at 09:05

## 2023-05-03 RX ADMIN — LIOTHYRONINE SODIUM 25 MCG: 25 TABLET ORAL at 09:05

## 2023-05-03 RX ADMIN — ATORVASTATIN CALCIUM 80 MG: 40 TABLET, FILM COATED ORAL at 09:05

## 2023-05-03 RX ADMIN — Medication 6 MG: at 09:05

## 2023-05-03 RX ADMIN — CEFTRIAXONE 1 G: 1 INJECTION, POWDER, FOR SOLUTION INTRAMUSCULAR; INTRAVENOUS at 10:05

## 2023-05-03 RX ADMIN — ALUMINUM HYDROXIDE, MAGNESIUM HYDROXIDE, AND SIMETHICONE 30 ML: 200; 200; 20 SUSPENSION ORAL at 03:05

## 2023-05-03 RX ADMIN — ACETAMINOPHEN 1000 MG: 500 TABLET ORAL at 11:05

## 2023-05-03 RX ADMIN — ASPIRIN 81 MG: 81 TABLET, COATED ORAL at 09:05

## 2023-05-03 RX ADMIN — HYDROXYZINE HYDROCHLORIDE 50 MG: 25 TABLET, FILM COATED ORAL at 03:05

## 2023-05-03 RX ADMIN — LEVOTHYROXINE SODIUM 150 MCG: 75 TABLET ORAL at 06:05

## 2023-05-03 RX ADMIN — CYANOCOBALAMIN TAB 1000 MCG 1000 MCG: 1000 TAB at 09:05

## 2023-05-03 RX ADMIN — MUPIROCIN: 20 OINTMENT TOPICAL at 10:05

## 2023-05-03 RX ADMIN — OXYBUTYNIN CHLORIDE 10 MG: 5 TABLET, EXTENDED RELEASE ORAL at 09:05

## 2023-05-03 RX ADMIN — QUETIAPINE FUMARATE 200 MG: 100 TABLET ORAL at 09:05

## 2023-05-03 RX ADMIN — TRAZODONE HYDROCHLORIDE 300 MG: 100 TABLET ORAL at 09:05

## 2023-05-03 RX ADMIN — Medication 10 ML: at 06:05

## 2023-05-03 RX ADMIN — DOXYCYCLINE HYCLATE 100 MG: 100 TABLET, COATED ORAL at 09:05

## 2023-05-03 RX ADMIN — GUAIFENESIN 200 MG: 200 SOLUTION ORAL at 09:05

## 2023-05-03 RX ADMIN — FLUOXETINE HYDROCHLORIDE 60 MG: 20 CAPSULE ORAL at 09:05

## 2023-05-03 RX ADMIN — HYDROCODONE BITARTRATE AND ACETAMINOPHEN 1 TABLET: 10; 325 TABLET ORAL at 12:05

## 2023-05-03 RX ADMIN — FUROSEMIDE 40 MG: 10 INJECTION, SOLUTION INTRAMUSCULAR; INTRAVENOUS at 10:05

## 2023-05-03 RX ADMIN — KETOROLAC TROMETHAMINE 15 MG: 30 INJECTION, SOLUTION INTRAMUSCULAR; INTRAVENOUS at 10:05

## 2023-05-03 RX ADMIN — STANDARDIZED SENNA CONCENTRATE 2 TABLET: 8.6 TABLET ORAL at 09:05

## 2023-05-03 RX ADMIN — LORAZEPAM 0.5 MG: 0.5 TABLET ORAL at 10:05

## 2023-05-03 RX ADMIN — TIOTROPIUM BROMIDE INHALATION SPRAY 2 PUFF: 3.12 SPRAY, METERED RESPIRATORY (INHALATION) at 07:05

## 2023-05-03 RX ADMIN — LIDOCAINE 1 PATCH: 50 PATCH CUTANEOUS at 10:05

## 2023-05-03 RX ADMIN — FLUDROCORTISONE ACETATE 100 MCG: 0.1 TABLET ORAL at 09:05

## 2023-05-03 RX ADMIN — HYDROXYZINE HYDROCHLORIDE 50 MG: 25 TABLET, FILM COATED ORAL at 11:05

## 2023-05-03 RX ADMIN — FLUTICASONE PROPIONATE 100 MCG: 50 SPRAY, METERED NASAL at 10:05

## 2023-05-03 NOTE — PROGRESS NOTES
Wound care follow up:  2-layer compression wrap to BLE changed as directed. No open wounds. Bilateral heels intact with no redness. Pt to resume with compression therapy after discharge with home health. Notified nurse and Dr. Sullivan of care provided.

## 2023-05-03 NOTE — PROGRESS NOTES
[facility] Medicine  Progress Note    Patient Name: Tasha Hawley  MRN: [id number]  Patient Class: IP- Inpatient   Admission Date: 4/28/2023  Length of Stay: 5 days  Attending Physician: Bharti Sullivan MD  Primary Care Provider: Mesfin Hodges Ii, MD        Subjective:     Principal Problem:Sepsis with encephalopathy and septic shock    HPI:  Ms. Hawley is a 68yo woman with adrenal insufficiency, chronic pain on dilaudid intrathecal pump, sleep apnea, hypothyroidism, lymphedema, suprapubic catheter who presents with several days of shortness of breath.  States that over the past few days she has been having worsening shortness of breath along with productive cough.  Denies any fevers or chills, although she does have a recorded fever upon admission here.  She does use supplemental oxygen at home and usually uses CPAP device.  She also states that she has had some chest pain that started yesterday.  Worse with deep inspiration.  She notes bilateral lower extremity swelling as well.      Overview/Hospital Course:  No notes on file    Interval History:  Working with physical therapy.  Reports feeling better overall but has chest tightness.  Remains on 4 L oxygen via NC saturating 97%.  She could likely be weaned further but likes the flow and does not want to be weaned.  Does not feel comfortable going home.  Scheduled breathing treatments.  Incentive spirometer.  Will receive scheduled Lasix 40 mg IV.     Review of Systems   Respiratory:  Positive for cough and shortness of breath.    Musculoskeletal:  Positive for back pain.   Objective:     Vital Signs (Most Recent):  Temp: 98.4 °F (36.9 °C) (05/03/23 1629)  Pulse: (!) 51 (05/03/23 1629)  Resp: 16 (05/03/23 1629)  BP: 134/61 (05/03/23 1629)  SpO2: 100 % (05/03/23 1629)   Vital Signs (24h Range):  Temp:  [96.1 °F (35.6 °C)-98.4 °F (36.9 °C)] 98.4 °F (36.9 °C)  Pulse:  [51-62] 51  Resp:  [16-20] 16  SpO2:  [93 %-100 %] 100 %  BP:  (107-134)/(52-70) 134/61     Weight: 105.2 kg (232 lb)  Body mass index is 38.61 kg/m².    Intake/Output Summary (Last 24 hours) at 5/3/2023 1742  Last data filed at 5/3/2023 1245  Gross per 24 hour   Intake 750 ml   Output 3300 ml   Net -2550 ml        Physical Exam  Vitals reviewed.   Constitutional:       General: She is not in acute distress.     Appearance: She is well-developed. She is ill-appearing. She is not diaphoretic.   HENT:      Head: Normocephalic and atraumatic.      Nose: Nose normal.   Eyes:      General: No scleral icterus.     Pupils: Pupils are equal, round, and reactive to light.   Neck:      Vascular: No JVD.      Trachea: No tracheal deviation.   Cardiovascular:      Rate and Rhythm: Normal rate and regular rhythm.      Heart sounds: Normal heart sounds.   Pulmonary:      Effort: Pulmonary effort is normal. No respiratory distress.      Breath sounds: Rales present.      Comments: On supplemental oxygen  Abdominal:      General: There is no distension.      Palpations: Abdomen is soft.      Tenderness: There is no abdominal tenderness.   Musculoskeletal:         General: No deformity.      Cervical back: Normal range of motion.      Right lower leg: Edema present.      Left lower leg: Edema present.   Skin:     General: Skin is warm and dry.      Findings: No rash.   Neurological:      Mental Status: She is alert and oriented to person, place, and time.   Psychiatric:         Behavior: Behavior normal.       Significant Labs: All pertinent labs within the past 24 hours have been reviewed.    Significant Imaging: I have reviewed all pertinent imaging results/findings within the past 24 hours.      Assessment/Plan:      * Sepsis with encephalopathy and septic shock  -presents with septic shock presumably due to multifocal pneumonia; also likely component of adrenal insufficiency  -mild encephalopathy, lethargy; now resolved  -fluid resuscitated in the ED without appropriate response    -initiated on Levophed, now discontinued  -stress dose steroids, weaning back to home doses  -blood cultures collected in the ED   -initial lactic acid 2.6; has improved upon reassessment  -initially on broad-spectrum antibiotics; she has grown ESBL and MSSA in the past - now de-escalated to ceftriaxone, doxycycline  -stepped out of ICU 4/29    Multifocal pneumonia  -chest x-ray concerning for multifocal pneumonia   -initiated on broad-spectrum antibiotics   -respiratory sputum cultures and blood cultures no growth to date; de-escalate to ceftriaxone/doxy  -see septic shock      Acute respiratory failure with hypoxia and hypercarbia  Patient with Hypercapnic and Hypoxic Respiratory failure which is Acute on chronic.  she is on home oxygen at 2 LPM. Supplemental oxygen was provided and noted- Oxygen Concentration (%):  [35] 35    .   Signs/symptoms of respiratory failure include- respiratory distress and lethargy. Contributing diagnoses includes - Pneumonia Labs and images were reviewed. Patient Has recent ABG, which has been reviewed. Will treat underlying causes and adjust management of respiratory failure as follows-     -treat pneumonia  -continue home inhaler regimen  -incentive spirometry  -nightly CPAP  -IV lasix daily to maintain euvolemia    Schedule inhalers     Adrenal insufficiency  -stress dose IV hydrocortisone 50mg q6 initially, will titrate down towards home PO dose      History of stroke with residual deficit        Debility  -PT/OT following, recommend SNF      Chronic diastolic heart failure  -BNP within normal range; does have some leg swelling but this is likely secondary to her lymphedema      Mixed stress and urge urinary incontinence  -oxybutynin      Suprapubic catheter  -in place   -UA not consistent with infection      Insomnia due to medical condition  -continue home trazodone      S/P insertion of intrathecal pump  -noted      S/P insertion of spinal cord  stimulator  -noted      Lymphedema of both lower extremities  -noted   -IV Lasix given at admission, continue wrapping   -wound care      Primary hypothyroidism  -resume home thyroid medication   -TSH within normal limits      Neurogenic bladder  -continue home oxybutynin      Coronary artery disease of native artery with stable angina pectoris  -continue home aspirin and statin      Narcotic dependency, continuous  -on intrathecal pain pump with breakthrough Norco      Chronic pain syndrome  -noted   -on intrathecal pain pump with p.r.n. hydrocodone      Major depressive disorder, recurrent, mild  Patient has recurrent depression which is mild and is currently controlled. Will Continue anti-depressant medications. We will not consult psychiatry at this time. Patient does not display psychosis at this time. Continue to monitor closely and adjust plan of care as needed.        Sleep apnea  -resume home CPAP      Generalized anxiety disorder  -continue home meds  -prn low dose ativan      VTE Risk Mitigation (From admission, onward)         Ordered     enoxaparin injection 40 mg  Daily         04/28/23 1115     IP VTE HIGH RISK PATIENT  Once         04/28/23 1115     Place sequential compression device  Until discontinued         04/28/23 1115                Discharge Planning   JACKIE:      Code Status: Full Code   Is the patient medically ready for discharge?:     Reason for patient still in hospital (select all that apply): Patient trending condition  Discharge Plan A: Home with family, Home Health                  Bharti Sullivan MD  Department of Hospital Medicine   [facility]

## 2023-05-03 NOTE — PLAN OF CARE
Problem: Adult Inpatient Plan of Care  Goal: Plan of Care Review  Outcome: Ongoing, Progressing  Goal: Optimal Comfort and Wellbeing  Outcome: Ongoing, Progressing  Goal: Readiness for Transition of Care  Outcome: Ongoing, Progressing     Problem: Infection  Goal: Absence of Infection Signs and Symptoms  Outcome: Ongoing, Progressing     Problem: Adjustment to Illness (Sepsis/Septic Shock)  Goal: Optimal Coping  Outcome: Ongoing, Progressing     Problem: Infection (Pneumonia)  Goal: Resolution of Infection Signs and Symptoms  Outcome: Ongoing, Progressing     Problem: Respiratory Compromise (Pneumonia)  Goal: Effective Oxygenation and Ventilation  Outcome: Ongoing, Progressing     Problem: Skin Injury Risk Increased  Goal: Skin Health and Integrity  Outcome: Ongoing, Progressing     Problem: Anxiety  Goal: Anxiety Reduction or Resolution  Outcome: Ongoing, Progressing     Problem: Heart Failure Comorbidity  Goal: Maintenance of Heart Failure Symptom Control  Outcome: Ongoing, Progressing     Problem: Pain Chronic (Persistent) (Comorbidity Management)  Goal: Acceptable Pain Control and Functional Ability  Outcome: Ongoing, Progressing

## 2023-05-03 NOTE — PT/OT/SLP PROGRESS
Physical Therapy Treatment    Patient Name:  Tasha Hawley   MRN:  [id number]    Recommendations:     Discharge Recommendations:  (post acute placement)  Discharge Equipment Recommendations: none  Barriers to discharge:  decreased mobility,strength and endurance    Assessment:     Tasha Hawley is a 67 y.o. female admitted with a medical diagnosis of Sepsis with encephalopathy and septic shock.  She presents with the following impairments/functional limitations: weakness, impaired endurance, impaired functional mobility, gait instability, impaired balance, decreased lower extremity function, pain, decreased ROM, impaired coordination, impaired skin, edema, impaired cardiopulmonary response to activity,pt remains with B le swelling and requires assistance for safety with gait,pt will benefit from post acute placement upon discharge.    Rehab Prognosis: Fair; patient would benefit from acute skilled PT services to address these deficits and reach maximum level of function.    Recent Surgery: * No surgery found *      Plan:     During this hospitalization, patient to be seen 5 x/week to address the identified rehab impairments via gait training, therapeutic activities, therapeutic exercises, neuromuscular re-education and progress toward the following goals:    Plan of Care Expires:  05/30/23    Subjective     Chief Complaint: n/a  Patient/Family Comments/goals: pt agreeable to rx.  Pain/Comfort:  Pain Rating 1:  (no rating)  Location - Orientation 1: lower  Location 1: back  Pain Addressed 1: Distraction, Reposition      Objective:     Communicated with nsg prior to session.  Patient found up in chair with meadows catheter, oxygen, peripheral IV, telemetry upon PT entry to room.     General Precautions: Standard, fall  Orthopedic Precautions: N/A  Braces: N/A  Respiratory Status:  supplemental O2     Functional Mobility:  Transfers:     Sit to Stand:  stand by assistance with rolling walker  Gait: amb ~45' with RW  and CGA with O2 donned  Balance: fair standing balance with RW      AM-PAC 6 CLICK MOBILITY  Turning over in bed (including adjusting bedclothes, sheets and blankets)?: 3  Sitting down on and standing up from a chair with arms (e.g., wheelchair, bedside commode, etc.): 3  Moving from lying on back to sitting on the side of the bed?: 3  Moving to and from a bed to a chair (including a wheelchair)?: 3  Need to walk in hospital room?: 3  Climbing 3-5 steps with a railing?: 1  Basic Mobility Total Score: 16       Treatment & Education: le seated ex's X 10 reps inc: ap's,laq's and hip flex.      Patient left up in chair with all lines intact, call button in reach, and nsg notified..    GOALS: see general POC  Multidisciplinary Problems       Physical Therapy Goals          Problem: Physical Therapy    Goal Priority Disciplines Outcome Goal Variances Interventions   Physical Therapy Goal     PT, PT/OT Ongoing, Progressing     Description: Goals to be met by: 2023     Patient will increase functional independence with mobility by performin. Supine to sit with Stand-by Assistance  2. Sit to supine with Stand-by Assistance  3. Rolling to Left and Right with Stand-by Assistance.  4. Sit to stand transfer with Stand-by Assistance  5. Bed to chair transfer with Stand-by Assistance using Rolling Walker  6. Gait  x 100 feet with Supervision using Rolling Walker.                          Time Tracking:     PT Received On: 23  PT Start Time: 1413     PT Stop Time: 1437  PT Total Time (min): 24 min     Billable Minutes: Gait Training 14 and Therapeutic Exercise 10    Treatment Type: Treatment  PT/PTA: PTA     Number of PTA visits since last PT visit: 3     2023

## 2023-05-03 NOTE — SUBJECTIVE & OBJECTIVE
Interval History:  Working with physical therapy.  Reports feeling better overall but has chest tightness.  Remains on 4 L oxygen via NC saturating 97%.  She could likely be weaned further but likes the flow and does not want to be weaned.  Does not feel comfortable going home.  Scheduled breathing treatments.  Incentive spirometer.  Will receive scheduled Lasix 40 mg IV.     Review of Systems   Respiratory:  Positive for cough and shortness of breath.    Musculoskeletal:  Positive for back pain.   Objective:     Vital Signs (Most Recent):  Temp: 98.4 °F (36.9 °C) (05/03/23 1629)  Pulse: (!) 51 (05/03/23 1629)  Resp: 16 (05/03/23 1629)  BP: 134/61 (05/03/23 1629)  SpO2: 100 % (05/03/23 1629)   Vital Signs (24h Range):  Temp:  [96.1 °F (35.6 °C)-98.4 °F (36.9 °C)] 98.4 °F (36.9 °C)  Pulse:  [51-62] 51  Resp:  [16-20] 16  SpO2:  [93 %-100 %] 100 %  BP: (107-134)/(52-70) 134/61     Weight: 105.2 kg (232 lb)  Body mass index is 38.61 kg/m².    Intake/Output Summary (Last 24 hours) at 5/3/2023 1742  Last data filed at 5/3/2023 1245  Gross per 24 hour   Intake 750 ml   Output 3300 ml   Net -2550 ml        Physical Exam  Vitals reviewed.   Constitutional:       General: She is not in acute distress.     Appearance: She is well-developed. She is ill-appearing. She is not diaphoretic.   HENT:      Head: Normocephalic and atraumatic.      Nose: Nose normal.   Eyes:      General: No scleral icterus.     Pupils: Pupils are equal, round, and reactive to light.   Neck:      Vascular: No JVD.      Trachea: No tracheal deviation.   Cardiovascular:      Rate and Rhythm: Normal rate and regular rhythm.      Heart sounds: Normal heart sounds.   Pulmonary:      Effort: Pulmonary effort is normal. No respiratory distress.      Breath sounds: Rales present.      Comments: On supplemental oxygen  Abdominal:      General: There is no distension.      Palpations: Abdomen is soft.      Tenderness: There is no abdominal tenderness.    Musculoskeletal:         General: No deformity.      Cervical back: Normal range of motion.      Right lower leg: Edema present.      Left lower leg: Edema present.   Skin:     General: Skin is warm and dry.      Findings: No rash.   Neurological:      Mental Status: She is alert and oriented to person, place, and time.   Psychiatric:         Behavior: Behavior normal.       Significant Labs: All pertinent labs within the past 24 hours have been reviewed.    Significant Imaging: I have reviewed all pertinent imaging results/findings within the past 24 hours.

## 2023-05-03 NOTE — PLAN OF CARE
Problem: Occupational Therapy  Goal: Occupational Therapy Goal  Description: Goals to be met by: 05/30/23     Patient will increase functional independence with ADLs by performing:    UE Dressing with Modified [place].  LE Dressing with Minimal Assistance and Assistive Devices as needed.  Grooming while EOB with Set-up Assistance.  Toileting from bedside commode with Minimal Assistance for hygiene and clothing management.   Toilet transfer to bedside commode with Stand-by Assistance.  Upper extremity exercise program 3x10 reps per handout, with supervision.    Outcome: Ongoing, Progressing   Tasha Hawley is a 67 y.o. female with a medical diagnosis of Sepsis with encephalopathy and septic shock.  Performance deficits affecting function are weakness, impaired endurance, impaired self care skills, impaired functional mobility, gait instability, impaired balance, decreased upper extremity function, decreased lower extremity function, impaired cardiopulmonary response to activity. Pt found in bed, agreeable to therapy.    Pt tolerated tx fairly well with complaints of mild SOB at rest and with exertion. Pt progressing towards goals. Continue OT services to address functional goals, progressing as able.

## 2023-05-03 NOTE — PT/OT/SLP PROGRESS
Occupational Therapy   Treatment    Name: Tasha Hawley  MRN: [id number]  Admitting Diagnosis:  Sepsis with encephalopathy and septic shock       Recommendations:     Discharge Recommendations: other (see comments) (post acute placement)  Discharge Equipment Recommendations:  none  Barriers to discharge:  Other (Comment) (Recurrent admits/Frequent falls)    Assessment:     Tasha Hawley is a 67 y.o. female with a medical diagnosis of Sepsis with encephalopathy and septic shock.  Performance deficits affecting function are weakness, impaired endurance, impaired self care skills, impaired functional mobility, gait instability, impaired balance, decreased upper extremity function, decreased lower extremity function, impaired cardiopulmonary response to activity. Pt found in bed, agreeable to therapy.    Pt tolerated tx fairly well with complaints of mild SOB at rest and with exertion. Pt progressing towards goals. Continue OT services to address functional goals, progressing as able.      Rehab Prognosis:  Good; patient would benefit from acute skilled OT services to address these deficits and reach maximum level of function.       Plan:     Patient to be seen 3 x/week to address the above listed problems via self-care/home management, therapeutic activities, therapeutic exercises  Plan of Care Expires: 05/30/23  Plan of Care Reviewed with: patient    Subjective     Pain/Comfort:  Pain Rating 1: 0/10  Pain Rating Post-Intervention 1: 0/10    Objective:     Communicated with: RN prior to session.  Patient found HOB elevated with meadows catheter, oxygen, peripheral IV, telemetry upon OT entry to room.    General Precautions: Standard, fall    Orthopedic Precautions:N/A  Braces: N/A  Respiratory Status: Nasal cannula     Occupational Performance:     Bed Mobility:    Patient completed Rolling/Turning to Right with stand by assistance  Patient completed Scooting/Bridging with stand by assistance  Patient completed  Supine to Sit with stand by assistance     Functional Mobility/Transfers:  Patient completed Sit <> Stand Transfer with stand by assistance  with  rolling walker   Patient completed Bed <> Chair Transfer using Stand Pivot technique with stand by assistance with rolling walker  Functional Mobility: Pt ambulated room distances with SBA using RW flexed at trunk. Pt unable to stand upright secondary to chronic back pain.      Activities of Daily Living:  Grooming: modified independence chair level.       [facility] 6 Click ADL: 17    Treatment & Education:  Pt performed BUE AROM ex x 10 reps all major jts/planes.  Pt tolerated well with short rest breaks 2/2 mild SOB.     Patient left up in chair with all lines intact, call button in reach, and RN notified    GOALS:   Multidisciplinary Problems       Occupational Therapy Goals          Problem: Occupational Therapy    Goal Priority Disciplines Outcome Interventions   Occupational Therapy Goal     OT, PT/OT Ongoing, Progressing    Description: Goals to be met by: 05/30/23     Patient will increase functional independence with ADLs by performing:    UE Dressing with Modified [place].  LE Dressing with Minimal Assistance and Assistive Devices as needed.  Grooming while EOB with Set-up Assistance.  Toileting from bedside commode with Minimal Assistance for hygiene and clothing management.   Toilet transfer to bedside commode with Stand-by Assistance.  Upper extremity exercise program 3x10 reps per handout, with supervision.                         Time Tracking:     OT Date of Treatment: 05/03/23  OT Start Time: 1336  OT Stop Time: 1408  OT Total Time (min): 32 min    Billable Minutes:Self Care/Home Management 16  Therapeutic Activity 16               5/3/2023

## 2023-05-03 NOTE — PLAN OF CARE
SW made aware by [facility] that they are not able to accept pt. [facility] reports no beds being available at this time. Pt made aware that [facility] does not have any beds available. Pt expressed understanding. Pt reports that her  will provide transport home. Pt agreeable to dc with HH. Pt made aware of no dc today. Anticipate dc tomorrow if medically stable.     SW will continue to follow pt throughout her transitions of care and assist with any dc needs.     [facility]  has accepted pt.     Future Appointments   Date Time Provider Department Center   5/9/2023  1:40 PM Brent Ceballos MD [facility] CARDIO [place]   5/12/2023  1:00 PM Cami Romero MD [facility] PULMSVC [facility]   5/17/2023  4:00 PM Prince Vance MD [facility] GASTRO [facility]   7/7/2023 11:00 AM Mesfin Hodges II, MD [facility] PCW

## 2023-05-03 NOTE — ASSESSMENT & PLAN NOTE
Patient with Hypercapnic and Hypoxic Respiratory failure which is Acute on chronic.  she is on home oxygen at 2 LPM. Supplemental oxygen was provided and noted- Oxygen Concentration (%):  [35] 35    .   Signs/symptoms of respiratory failure include- respiratory distress and lethargy. Contributing diagnoses includes - Pneumonia Labs and images were reviewed. Patient Has recent ABG, which has been reviewed. Will treat underlying causes and adjust management of respiratory failure as follows-     -treat pneumonia  -continue home inhaler regimen  -incentive spirometry  -nightly CPAP  -IV lasix daily to maintain euvolemia    Schedule inhalers

## 2023-05-03 NOTE — PLAN OF CARE
Problem: Adult Inpatient Plan of Care  Goal: Plan of Care Review  Outcome: Ongoing, Progressing  Goal: Patient-Specific Goal (Individualized)  Outcome: Ongoing, Progressing  Goal: Optimal Comfort and Wellbeing  Outcome: Ongoing, Progressing  Goal: Readiness for Transition of Care  Outcome: Ongoing, Progressing     Problem: Infection  Goal: Absence of Infection Signs and Symptoms  Outcome: Ongoing, Progressing     Problem: Adjustment to Illness (Sepsis/Septic Shock)  Goal: Optimal Coping  Outcome: Ongoing, Progressing     Problem: Fluid Imbalance (Pneumonia)  Goal: Fluid Balance  Outcome: Ongoing, Progressing     Problem: Infection (Pneumonia)  Goal: Resolution of Infection Signs and Symptoms  Outcome: Ongoing, Progressing     Problem: Respiratory Compromise (Pneumonia)  Goal: Effective Oxygenation and Ventilation  Outcome: Ongoing, Progressing     Problem: Fall Injury Risk  Goal: Absence of Fall and Fall-Related Injury  Outcome: Ongoing, Progressing     Problem: Skin Injury Risk Increased  Goal: Skin Health and Integrity  Outcome: Ongoing, Progressing     Problem: Anxiety  Goal: Anxiety Reduction or Resolution  Outcome: Ongoing, Progressing     Problem: Obstructive Sleep Apnea Risk or Actual Comorbidity Management  Goal: Unobstructed Breathing During Sleep  Outcome: Ongoing, Progressing     Problem: Pain Chronic (Persistent) (Comorbidity Management)  Goal: Acceptable Pain Control and Functional Ability  Outcome: Ongoing, Progressing

## 2023-05-04 LAB — S PNEUM AG UR QL: NOT DETECTED

## 2023-05-04 PROCEDURE — 94799 UNLISTED PULMONARY SVC/PX: CPT | Mod: HCNC

## 2023-05-04 PROCEDURE — 94761 N-INVAS EAR/PLS OXIMETRY MLT: CPT | Mod: HCNC

## 2023-05-04 PROCEDURE — 94660 CPAP INITIATION&MGMT: CPT | Mod: HCNC

## 2023-05-04 PROCEDURE — 27000221 HC OXYGEN, UP TO 24 HOURS: Mod: HCNC

## 2023-05-04 PROCEDURE — 63600175 PHARM REV CODE 636 W HCPCS: Mod: HCNC | Performed by: HOSPITALIST

## 2023-05-04 PROCEDURE — 11000001 HC ACUTE MED/SURG PRIVATE ROOM: Mod: HCNC

## 2023-05-04 PROCEDURE — 25000003 PHARM REV CODE 250: Mod: HCNC | Performed by: HOSPITALIST

## 2023-05-04 PROCEDURE — 63600175 PHARM REV CODE 636 W HCPCS: Mod: HCNC | Performed by: STUDENT IN AN ORGANIZED HEALTH CARE EDUCATION/TRAINING PROGRAM

## 2023-05-04 PROCEDURE — 99900035 HC TECH TIME PER 15 MIN (STAT): Mod: HCNC

## 2023-05-04 PROCEDURE — 94640 AIRWAY INHALATION TREATMENT: CPT | Mod: HCNC

## 2023-05-04 PROCEDURE — 25000242 PHARM REV CODE 250 ALT 637 W/ HCPCS: Mod: HCNC | Performed by: STUDENT IN AN ORGANIZED HEALTH CARE EDUCATION/TRAINING PROGRAM

## 2023-05-04 PROCEDURE — 25000003 PHARM REV CODE 250: Mod: HCNC | Performed by: STUDENT IN AN ORGANIZED HEALTH CARE EDUCATION/TRAINING PROGRAM

## 2023-05-04 RX ADMIN — PANTOPRAZOLE SODIUM 40 MG: 40 TABLET, DELAYED RELEASE ORAL at 09:05

## 2023-05-04 RX ADMIN — CEFTRIAXONE 1 G: 1 INJECTION, POWDER, FOR SOLUTION INTRAMUSCULAR; INTRAVENOUS at 09:05

## 2023-05-04 RX ADMIN — IPRATROPIUM BROMIDE AND ALBUTEROL SULFATE 3 ML: .5; 3 SOLUTION RESPIRATORY (INHALATION) at 01:05

## 2023-05-04 RX ADMIN — IPRATROPIUM BROMIDE AND ALBUTEROL SULFATE 3 ML: .5; 3 SOLUTION RESPIRATORY (INHALATION) at 07:05

## 2023-05-04 RX ADMIN — FLUDROCORTISONE ACETATE 100 MCG: 0.1 TABLET ORAL at 09:05

## 2023-05-04 RX ADMIN — Medication 6 MG: at 09:05

## 2023-05-04 RX ADMIN — LEVOTHYROXINE SODIUM 150 MCG: 75 TABLET ORAL at 05:05

## 2023-05-04 RX ADMIN — ENOXAPARIN SODIUM 40 MG: 40 INJECTION SUBCUTANEOUS at 04:05

## 2023-05-04 RX ADMIN — CYANOCOBALAMIN TAB 1000 MCG 1000 MCG: 1000 TAB at 09:05

## 2023-05-04 RX ADMIN — ATORVASTATIN CALCIUM 80 MG: 40 TABLET, FILM COATED ORAL at 09:05

## 2023-05-04 RX ADMIN — LORAZEPAM 0.5 MG: 0.5 TABLET ORAL at 03:05

## 2023-05-04 RX ADMIN — DOXYCYCLINE HYCLATE 100 MG: 100 TABLET, COATED ORAL at 09:05

## 2023-05-04 RX ADMIN — FUROSEMIDE 40 MG: 10 INJECTION, SOLUTION INTRAMUSCULAR; INTRAVENOUS at 09:05

## 2023-05-04 RX ADMIN — QUETIAPINE FUMARATE 200 MG: 100 TABLET ORAL at 09:05

## 2023-05-04 RX ADMIN — TIOTROPIUM BROMIDE INHALATION SPRAY 2 PUFF: 3.12 SPRAY, METERED RESPIRATORY (INHALATION) at 07:05

## 2023-05-04 RX ADMIN — HYDROCODONE BITARTRATE AND ACETAMINOPHEN 1 TABLET: 10; 325 TABLET ORAL at 02:05

## 2023-05-04 RX ADMIN — HYDROCORTISONE SODIUM SUCCINATE 50 MG: 100 INJECTION, POWDER, FOR SOLUTION INTRAMUSCULAR; INTRAVENOUS at 09:05

## 2023-05-04 RX ADMIN — LIDOCAINE 1 PATCH: 50 PATCH CUTANEOUS at 09:05

## 2023-05-04 RX ADMIN — FLUTICASONE PROPIONATE 100 MCG: 50 SPRAY, METERED NASAL at 09:05

## 2023-05-04 RX ADMIN — STANDARDIZED SENNA CONCENTRATE 2 TABLET: 8.6 TABLET ORAL at 09:05

## 2023-05-04 RX ADMIN — ACETAMINOPHEN 650 MG: 325 TABLET ORAL at 05:05

## 2023-05-04 RX ADMIN — HYDROCODONE BITARTRATE AND ACETAMINOPHEN 1 TABLET: 10; 325 TABLET ORAL at 09:05

## 2023-05-04 RX ADMIN — ASPIRIN 81 MG: 81 TABLET, COATED ORAL at 09:05

## 2023-05-04 RX ADMIN — FLUOXETINE HYDROCHLORIDE 60 MG: 20 CAPSULE ORAL at 09:05

## 2023-05-04 RX ADMIN — OXYBUTYNIN CHLORIDE 10 MG: 5 TABLET, EXTENDED RELEASE ORAL at 09:05

## 2023-05-04 RX ADMIN — LIOTHYRONINE SODIUM 25 MCG: 25 TABLET ORAL at 09:05

## 2023-05-04 RX ADMIN — TRAZODONE HYDROCHLORIDE 300 MG: 100 TABLET ORAL at 09:05

## 2023-05-04 NOTE — PT/OT/SLP PROGRESS
Physical Therapy      Patient Name:  Tasha Hawley   MRN:  [id number]    Patient not seen today secondary to Patient unwilling to participate, Pain (pt reports too much pain in back and has a migraine; informed nurse.). Will follow-up tomorrow.  5/4/2023

## 2023-05-04 NOTE — SUBJECTIVE & OBJECTIVE
Interval History: feeling better overall but continues to have chest tightness. Improved with breathing treatments. She would like to stay one more day because she gets a panic attach when she feels chest tightness. She is in agreement to discharge home tomorrow with  as long as remains stable. Emphasized importance of medication compliance.     Updated  Dangelo via telephone. He speaks about his medical problems and does his best to help the patient. All questions answered.     Review of Systems   Constitutional:  Positive for fatigue.   Respiratory:  Positive for cough and shortness of breath.    Musculoskeletal:  Positive for back pain.   Objective:     Vital Signs (Most Recent):  Temp: 96.1 °F (35.6 °C) (05/04/23 0744)  Pulse: 67 (05/04/23 1300)  Resp: 14 (05/04/23 1458)  BP: 125/60 (05/04/23 1153)  SpO2: 95 % (05/04/23 1300)   Vital Signs (24h Range):  Temp:  [96.1 °F (35.6 °C)-98.9 °F (37.2 °C)] 96.1 °F (35.6 °C)  Pulse:  [52-72] 67  Resp:  [14-20] 14  SpO2:  [91 %-100 %] 95 %  BP: ()/(47-61) 125/60     Weight: 105.2 kg (232 lb)  Body mass index is 38.61 kg/m².    Intake/Output Summary (Last 24 hours) at 5/4/2023 1706  Last data filed at 5/4/2023 1024  Gross per 24 hour   Intake --   Output 1400 ml   Net -1400 ml        Physical Exam  Vitals reviewed.   Constitutional:       General: She is not in acute distress.     Appearance: She is well-developed. She is ill-appearing. She is not diaphoretic.   HENT:      Head: Normocephalic and atraumatic.      Nose: Nose normal.   Eyes:      General: No scleral icterus.     Pupils: Pupils are equal, round, and reactive to light.   Neck:      Vascular: No JVD.      Trachea: No tracheal deviation.   Cardiovascular:      Rate and Rhythm: Normal rate and regular rhythm.      Heart sounds: Normal heart sounds.   Pulmonary:      Effort: Pulmonary effort is normal. No respiratory distress.      Breath sounds: Rales present.      Comments: On supplemental  oxygen  Abdominal:      General: There is no distension.      Palpations: Abdomen is soft.      Tenderness: There is no abdominal tenderness.   Musculoskeletal:         General: No deformity.      Cervical back: Normal range of motion.      Right lower leg: Edema present.      Left lower leg: Edema (improving) present.   Skin:     General: Skin is warm and dry.      Findings: No rash.   Neurological:      Mental Status: She is alert and oriented to person, place, and time.   Psychiatric:         Behavior: Behavior normal.       Significant Labs: All pertinent labs within the past 24 hours have been reviewed.    Significant Imaging: I have reviewed all pertinent imaging results/findings within the past 24 hours.

## 2023-05-04 NOTE — PLAN OF CARE
SW met with pt during rounding with MD. Pt made aware of no dc today. Anticipate dc tomorrow with HH. Pt reports that her  will provide transport home upon dc. SW informed pt that  must bring portable O2 tank to hospital upon dc. SW will continue to follow pt throughout her transitions of care and assist with any dc needs.     SW requested PCC follow up.   SW requested Endocrinology follow up    Future Appointments   Date Time Provider Department Center   5/9/2023  1:40 PM Brent Ceballos MD [facility] CARDIO [place]   5/12/2023  1:00 PM Cami Romero MD [facility] PULMSVC [facility]   5/17/2023  4:00 PM Prince Vance MD [facility] GASTRO [facility]   7/7/2023 11:00 AM Mesfin Hodges II, MD [facility] PCW        05/04/23 0856   Post-Acute Status   Post-Acute Authorization Home Health

## 2023-05-04 NOTE — PROGRESS NOTES
[facility] Medicine  Progress Note    Patient Name: Tasha Hawley  MRN: [id number]  Patient Class: IP- Inpatient   Admission Date: 4/28/2023  Length of Stay: 6 days  Attending Physician: Bharti Sullivan MD  Primary Care Provider: Mesfin Hodges Ii, MD        Subjective:     Principal Problem:Sepsis with encephalopathy and septic shock      HPI:  Ms. Hawley is a 68yo woman with adrenal insufficiency, chronic pain on dilaudid intrathecal pump, sleep apnea, hypothyroidism, lymphedema, suprapubic catheter who presents with several days of shortness of breath.  States that over the past few days she has been having worsening shortness of breath along with productive cough.  Denies any fevers or chills, although she does have a recorded fever upon admission here.  She does use supplemental oxygen at home and usually uses CPAP device.  She also states that she has had some chest pain that started yesterday.  Worse with deep inspiration.  She notes bilateral lower extremity swelling as well.      Overview/Hospital Course:  No notes on file    Interval History: feeling better overall but continues to have chest tightness. Improved with breathing treatments. She would like to stay one more day because she gets a panic attach when she feels chest tightness. She is in agreement to discharge home tomorrow with HH as long as remains stable. Emphasized importance of medication compliance.     Updated  Dangelo via telephone. He speaks about his medical problems and does his best to help the patient. All questions answered.     Review of Systems   Constitutional:  Positive for fatigue.   Respiratory:  Positive for cough and shortness of breath.    Musculoskeletal:  Positive for back pain.   Objective:     Vital Signs (Most Recent):  Temp: 96.1 °F (35.6 °C) (05/04/23 0744)  Pulse: 67 (05/04/23 1300)  Resp: 14 (05/04/23 1458)  BP: 125/60 (05/04/23 1153)  SpO2: 95 % (05/04/23 1300)   Vital Signs (24h  Range):  Temp:  [96.1 °F (35.6 °C)-98.9 °F (37.2 °C)] 96.1 °F (35.6 °C)  Pulse:  [52-72] 67  Resp:  [14-20] 14  SpO2:  [91 %-100 %] 95 %  BP: ()/(47-61) 125/60     Weight: 105.2 kg (232 lb)  Body mass index is 38.61 kg/m².    Intake/Output Summary (Last 24 hours) at 5/4/2023 1706  Last data filed at 5/4/2023 1024  Gross per 24 hour   Intake --   Output 1400 ml   Net -1400 ml        Physical Exam  Vitals reviewed.   Constitutional:       General: She is not in acute distress.     Appearance: She is well-developed. She is ill-appearing. She is not diaphoretic.   HENT:      Head: Normocephalic and atraumatic.      Nose: Nose normal.   Eyes:      General: No scleral icterus.     Pupils: Pupils are equal, round, and reactive to light.   Neck:      Vascular: No JVD.      Trachea: No tracheal deviation.   Cardiovascular:      Rate and Rhythm: Normal rate and regular rhythm.      Heart sounds: Normal heart sounds.   Pulmonary:      Effort: Pulmonary effort is normal. No respiratory distress.      Breath sounds: Rales present.      Comments: On supplemental oxygen  Abdominal:      General: There is no distension.      Palpations: Abdomen is soft.      Tenderness: There is no abdominal tenderness.   Musculoskeletal:         General: No deformity.      Cervical back: Normal range of motion.      Right lower leg: Edema present.      Left lower leg: Edema (improving) present.   Skin:     General: Skin is warm and dry.      Findings: No rash.   Neurological:      Mental Status: She is alert and oriented to person, place, and time.   Psychiatric:         Behavior: Behavior normal.       Significant Labs: All pertinent labs within the past 24 hours have been reviewed.    Significant Imaging: I have reviewed all pertinent imaging results/findings within the past 24 hours.      Assessment/Plan:      * Sepsis with encephalopathy and septic shock  -presents with septic shock presumably due to multifocal pneumonia; also likely  component of adrenal insufficiency  -mild encephalopathy, lethargy; now resolved  -fluid resuscitated in the ED without appropriate response   -initiated on Levophed, now discontinued  -stress dose steroids, weaning back to home doses  -blood cultures collected in the ED   -initial lactic acid 2.6; has improved upon reassessment  -initially on broad-spectrum antibiotics; she has grown ESBL and MSSA in the past - now de-escalated to ceftriaxone, doxycycline  -stepped out of ICU 4/29    Multifocal pneumonia  -chest x-ray concerning for multifocal pneumonia   -initiated on broad-spectrum antibiotics   -respiratory sputum cultures and blood cultures no growth to date; de-escalate to ceftriaxone/doxy  -see septic shock      Acute respiratory failure with hypoxia and hypercarbia  Patient with Hypercapnic and Hypoxic Respiratory failure which is Acute on chronic.  she is on home oxygen at 2 LPM. Supplemental oxygen was provided and noted- Oxygen Concentration (%):  [35] 35    .   Signs/symptoms of respiratory failure include- respiratory distress and lethargy. Contributing diagnoses includes - Pneumonia Labs and images were reviewed. Patient Has recent ABG, which has been reviewed. Will treat underlying causes and adjust management of respiratory failure as follows-     -treat pneumonia  -continue home inhaler regimen  -incentive spirometry  -nightly CPAP  -IV lasix daily to maintain euvolemia    Schedule inhalers     Adrenal insufficiency  -stress dose IV hydrocortisone 50mg q6 initially, will titrate down towards home PO dose      History of stroke with residual deficit        Debility  -PT/OT following, recommend SNF      Chronic diastolic heart failure  -BNP within normal range; does have some leg swelling but this is likely secondary to her lymphedema      Mixed stress and urge urinary incontinence  -oxybutynin      Suprapubic catheter  -in place   -UA not consistent with infection      Insomnia due to medical  condition  -continue home trazodone      S/P insertion of intrathecal pump  -noted      S/P insertion of spinal cord stimulator  -noted      Lymphedema of both lower extremities  -noted   -IV Lasix given at admission, continue wrapping   -wound care      Primary hypothyroidism  -resume home thyroid medication   -TSH within normal limits      Neurogenic bladder  -continue home oxybutynin      Coronary artery disease of native artery with stable angina pectoris  -continue home aspirin and statin      Narcotic dependency, continuous  -on intrathecal pain pump with breakthrough Norco      Chronic pain syndrome  -noted   -on intrathecal pain pump with p.r.n. hydrocodone      Major depressive disorder, recurrent, mild  Patient has recurrent depression which is mild and is currently controlled. Will Continue anti-depressant medications. We will not consult psychiatry at this time. Patient does not display psychosis at this time. Continue to monitor closely and adjust plan of care as needed.        Sleep apnea  -resume home CPAP      Generalized anxiety disorder  -continue home meds  -prn low dose ativan      VTE Risk Mitigation (From admission, onward)         Ordered     enoxaparin injection 40 mg  Daily         04/28/23 1115     IP VTE HIGH RISK PATIENT  Once         04/28/23 1115     Place sequential compression device  Until discontinued         04/28/23 1115                Discharge Planning   JACKIE:      Code Status: Full Code   Is the patient medically ready for discharge?:     Reason for patient still in hospital (select all that apply): Patient trending condition  Discharge Plan A: Home with family, Home Health              Bharti Sullivan MD  Department of Hospital Medicine   [facility]

## 2023-05-05 VITALS
OXYGEN SATURATION: 95 % | HEART RATE: 55 BPM | WEIGHT: 247.13 LBS | BODY MASS INDEX: 41.18 KG/M2 | TEMPERATURE: 97 F | HEIGHT: 65 IN | RESPIRATION RATE: 18 BRPM | SYSTOLIC BLOOD PRESSURE: 106 MMHG | DIASTOLIC BLOOD PRESSURE: 54 MMHG

## 2023-05-05 PROBLEM — A41.9 SEPSIS WITH ENCEPHALOPATHY AND SEPTIC SHOCK: Status: RESOLVED | Noted: 2023-04-28 | Resolved: 2023-05-05

## 2023-05-05 PROBLEM — R65.21 SEPSIS WITH ENCEPHALOPATHY AND SEPTIC SHOCK: Status: RESOLVED | Noted: 2023-04-28 | Resolved: 2023-05-05

## 2023-05-05 PROBLEM — G93.41 SEPSIS WITH ENCEPHALOPATHY AND SEPTIC SHOCK: Status: RESOLVED | Noted: 2023-04-28 | Resolved: 2023-05-05

## 2023-05-05 LAB
ANION GAP SERPL CALC-SCNC: 8 MMOL/L (ref 8–16)
BASOPHILS # BLD AUTO: 0.01 K/UL (ref 0–0.2)
BASOPHILS NFR BLD: 0.1 % (ref 0–1.9)
BUN SERPL-MCNC: 11 MG/DL (ref 8–23)
CALCIUM SERPL-MCNC: 8.7 MG/DL (ref 8.7–10.5)
CHLORIDE SERPL-SCNC: 98 MMOL/L (ref 95–110)
CO2 SERPL-SCNC: 35 MMOL/L (ref 23–29)
CREAT SERPL-MCNC: 0.8 MG/DL (ref 0.5–1.4)
DIFFERENTIAL METHOD: ABNORMAL
EOSINOPHIL # BLD AUTO: 0.2 K/UL (ref 0–0.5)
EOSINOPHIL NFR BLD: 3.5 % (ref 0–8)
ERYTHROCYTE [DISTWIDTH] IN BLOOD BY AUTOMATED COUNT: 14.4 % (ref 11.5–14.5)
EST. GFR  (NO RACE VARIABLE): >60 ML/MIN/1.73 M^2
GLUCOSE SERPL-MCNC: 121 MG/DL (ref 70–110)
HCT VFR BLD AUTO: 27.4 % (ref 37–48.5)
HGB BLD-MCNC: 8.3 G/DL (ref 12–16)
IMM GRANULOCYTES # BLD AUTO: 0.06 K/UL (ref 0–0.04)
IMM GRANULOCYTES NFR BLD AUTO: 0.9 % (ref 0–0.5)
LYMPHOCYTES # BLD AUTO: 1.9 K/UL (ref 1–4.8)
LYMPHOCYTES NFR BLD: 27.7 % (ref 18–48)
MCH RBC QN AUTO: 26.1 PG (ref 27–31)
MCHC RBC AUTO-ENTMCNC: 30.3 G/DL (ref 32–36)
MCV RBC AUTO: 86 FL (ref 82–98)
MONOCYTES # BLD AUTO: 0.7 K/UL (ref 0.3–1)
MONOCYTES NFR BLD: 10.3 % (ref 4–15)
NEUTROPHILS # BLD AUTO: 3.9 K/UL (ref 1.8–7.7)
NEUTROPHILS NFR BLD: 57.5 % (ref 38–73)
NRBC BLD-RTO: 0 /100 WBC
PLATELET # BLD AUTO: 277 K/UL (ref 150–450)
PMV BLD AUTO: 9.5 FL (ref 9.2–12.9)
POTASSIUM SERPL-SCNC: 3.9 MMOL/L (ref 3.5–5.1)
RBC # BLD AUTO: 3.18 M/UL (ref 4–5.4)
SODIUM SERPL-SCNC: 141 MMOL/L (ref 136–145)
WBC # BLD AUTO: 6.82 K/UL (ref 3.9–12.7)

## 2023-05-05 PROCEDURE — 94799 UNLISTED PULMONARY SVC/PX: CPT | Mod: HCNC

## 2023-05-05 PROCEDURE — 99900035 HC TECH TIME PER 15 MIN (STAT): Mod: HCNC

## 2023-05-05 PROCEDURE — 25000242 PHARM REV CODE 250 ALT 637 W/ HCPCS: Mod: HCNC | Performed by: STUDENT IN AN ORGANIZED HEALTH CARE EDUCATION/TRAINING PROGRAM

## 2023-05-05 PROCEDURE — 36415 COLL VENOUS BLD VENIPUNCTURE: CPT | Mod: HCNC | Performed by: HOSPITALIST

## 2023-05-05 PROCEDURE — 63600175 PHARM REV CODE 636 W HCPCS: Mod: HCNC | Performed by: STUDENT IN AN ORGANIZED HEALTH CARE EDUCATION/TRAINING PROGRAM

## 2023-05-05 PROCEDURE — 97116 GAIT TRAINING THERAPY: CPT | Mod: HCNC

## 2023-05-05 PROCEDURE — 85025 COMPLETE CBC W/AUTO DIFF WBC: CPT | Mod: HCNC | Performed by: HOSPITALIST

## 2023-05-05 PROCEDURE — 94640 AIRWAY INHALATION TREATMENT: CPT | Mod: HCNC

## 2023-05-05 PROCEDURE — 80048 BASIC METABOLIC PNL TOTAL CA: CPT | Mod: HCNC | Performed by: HOSPITALIST

## 2023-05-05 PROCEDURE — 27000221 HC OXYGEN, UP TO 24 HOURS: Mod: HCNC

## 2023-05-05 PROCEDURE — 97535 SELF CARE MNGMENT TRAINING: CPT | Mod: HCNC

## 2023-05-05 PROCEDURE — 97530 THERAPEUTIC ACTIVITIES: CPT | Mod: HCNC

## 2023-05-05 PROCEDURE — 94761 N-INVAS EAR/PLS OXIMETRY MLT: CPT | Mod: HCNC

## 2023-05-05 PROCEDURE — 25000003 PHARM REV CODE 250: Mod: HCNC | Performed by: STUDENT IN AN ORGANIZED HEALTH CARE EDUCATION/TRAINING PROGRAM

## 2023-05-05 PROCEDURE — 63600175 PHARM REV CODE 636 W HCPCS: Mod: HCNC | Performed by: HOSPITALIST

## 2023-05-05 PROCEDURE — 25000003 PHARM REV CODE 250: Mod: HCNC | Performed by: HOSPITALIST

## 2023-05-05 PROCEDURE — 94660 CPAP INITIATION&MGMT: CPT | Mod: HCNC

## 2023-05-05 RX ORDER — FLUDROCORTISONE ACETATE 0.1 MG/1
100 TABLET ORAL DAILY
Qty: 90 TABLET | Refills: 2 | Status: ON HOLD | OUTPATIENT
Start: 2023-05-05 | End: 2023-05-30 | Stop reason: SDUPTHER

## 2023-05-05 RX ORDER — LEVOFLOXACIN 500 MG/1
500 TABLET, FILM COATED ORAL DAILY
Qty: 2 TABLET | Refills: 0 | Status: SHIPPED | OUTPATIENT
Start: 2023-05-05 | End: 2023-05-22

## 2023-05-05 RX ORDER — HYDROCORTISONE 5 MG/1
10 TABLET ORAL DAILY
Status: DISCONTINUED | OUTPATIENT
Start: 2023-05-05 | End: 2023-05-05 | Stop reason: HOSPADM

## 2023-05-05 RX ORDER — FUROSEMIDE 40 MG/1
60 TABLET ORAL DAILY
Qty: 90 TABLET | Refills: 3 | Status: ON HOLD | OUTPATIENT
Start: 2023-05-05 | End: 2023-05-30 | Stop reason: SDUPTHER

## 2023-05-05 RX ORDER — HYDROCORTISONE 10 MG/1
TABLET ORAL
Qty: 135 TABLET | Refills: 3 | Status: SHIPPED | OUTPATIENT
Start: 2023-05-05 | End: 2023-06-08 | Stop reason: SDUPTHER

## 2023-05-05 RX ORDER — HYDROXYZINE HYDROCHLORIDE 50 MG/1
50 TABLET, FILM COATED ORAL EVERY 4 HOURS PRN
Qty: 30 TABLET | Refills: 0 | Status: SHIPPED | OUTPATIENT
Start: 2023-05-05

## 2023-05-05 RX ORDER — IPRATROPIUM BROMIDE AND ALBUTEROL SULFATE 2.5; .5 MG/3ML; MG/3ML
3 SOLUTION RESPIRATORY (INHALATION) EVERY 6 HOURS PRN
Qty: 90 ML | Refills: 0 | Status: SHIPPED | OUTPATIENT
Start: 2023-05-05 | End: 2023-07-31

## 2023-05-05 RX ORDER — GUAIFENESIN 100 MG/5ML
200 SOLUTION ORAL EVERY 4 HOURS PRN
Refills: 0 | COMMUNITY
Start: 2023-05-05 | End: 2023-05-15

## 2023-05-05 RX ADMIN — ATORVASTATIN CALCIUM 80 MG: 40 TABLET, FILM COATED ORAL at 09:05

## 2023-05-05 RX ADMIN — STANDARDIZED SENNA CONCENTRATE 2 TABLET: 8.6 TABLET ORAL at 09:05

## 2023-05-05 RX ADMIN — CEFTRIAXONE 1 G: 1 INJECTION, POWDER, FOR SOLUTION INTRAMUSCULAR; INTRAVENOUS at 10:05

## 2023-05-05 RX ADMIN — ALUMINUM HYDROXIDE, MAGNESIUM HYDROXIDE, AND SIMETHICONE 30 ML: 200; 200; 20 SUSPENSION ORAL at 04:05

## 2023-05-05 RX ADMIN — PANTOPRAZOLE SODIUM 40 MG: 40 TABLET, DELAYED RELEASE ORAL at 09:05

## 2023-05-05 RX ADMIN — LIDOCAINE 1 PATCH: 50 PATCH CUTANEOUS at 09:05

## 2023-05-05 RX ADMIN — CYANOCOBALAMIN TAB 1000 MCG 1000 MCG: 1000 TAB at 09:05

## 2023-05-05 RX ADMIN — IPRATROPIUM BROMIDE AND ALBUTEROL SULFATE 3 ML: .5; 3 SOLUTION RESPIRATORY (INHALATION) at 02:05

## 2023-05-05 RX ADMIN — FLUOXETINE HYDROCHLORIDE 60 MG: 20 CAPSULE ORAL at 09:05

## 2023-05-05 RX ADMIN — OXYBUTYNIN CHLORIDE 10 MG: 5 TABLET, EXTENDED RELEASE ORAL at 09:05

## 2023-05-05 RX ADMIN — HYDROXYZINE HYDROCHLORIDE 50 MG: 25 TABLET, FILM COATED ORAL at 04:05

## 2023-05-05 RX ADMIN — LIOTHYRONINE SODIUM 25 MCG: 25 TABLET ORAL at 09:05

## 2023-05-05 RX ADMIN — FLUTICASONE PROPIONATE 100 MCG: 50 SPRAY, METERED NASAL at 09:05

## 2023-05-05 RX ADMIN — LEVOTHYROXINE SODIUM 150 MCG: 75 TABLET ORAL at 06:05

## 2023-05-05 RX ADMIN — DOXYCYCLINE HYCLATE 100 MG: 100 TABLET, COATED ORAL at 09:05

## 2023-05-05 RX ADMIN — ASPIRIN 81 MG: 81 TABLET, COATED ORAL at 09:05

## 2023-05-05 RX ADMIN — HYDROCODONE BITARTRATE AND ACETAMINOPHEN 1 TABLET: 10; 325 TABLET ORAL at 04:05

## 2023-05-05 RX ADMIN — FUROSEMIDE 40 MG: 10 INJECTION, SOLUTION INTRAMUSCULAR; INTRAVENOUS at 09:05

## 2023-05-05 NOTE — DISCHARGE SUMMARY
[facility] Medicine  Discharge Summary      Patient Name: Tasha Hawley  MRN: [id number]  LUH: [id number]  Patient Class: IP- Inpatient  Admission Date: 4/28/2023  Hospital Length of Stay: 7 days  Discharge Date and Time: 5/5/2023 12:02 PM  Attending Physician: No att. providers found   Discharging Provider: Bharti Sullivan MD  Primary Care Provider: Mesfin Hodges Ii, MD    Primary Care Team: Networked reference to record PCT     HPI:   Ms. Hawley is a 66yo woman with adrenal insufficiency, chronic pain on dilaudid intrathecal pump, sleep apnea, hypothyroidism, lymphedema, suprapubic catheter who presents with several days of shortness of breath.  States that over the past few days she has been having worsening shortness of breath along with productive cough.  Denies any fevers or chills, although she does have a recorded fever upon admission here.  She does use supplemental oxygen at home and usually uses CPAP device.  She also states that she has had some chest pain that started yesterday.  Worse with deep inspiration.  She notes bilateral lower extremity swelling as well.      * No surgery found *      Hospital Course:   67-year-old female with PMH of multiple medical comorbidities including but not limited to CAD, heart failure reduced EF, chronic hypoxic respiratory failure on 3-5 L nasal cannula, adrenal insufficiency, hypothyroidism, chronic pain on intrathecal Dilaudid pump, suprapubic catheter with history of recurrent UTIs, CECI presented with shortness of breath, confusion and found to be in septic shock complicated with encephalopathy thought secondary to multifocal pneumonia with a component of adrenal insufficiency.  Shock and mentation improved with stress dose steroids, antibiotics.  She was briefly on pressors.  Also diuresed aggressively with IV Lasix.  Oxygen was weaned to baseline 3 L via NC.  Will complete a total of 10 days of antibiotics for multifocal pneumonia.  I do  not think she has had a formal evaluation for adrenal insufficiency by endocrinology.  Encouraged outpatient endocrinology follow-up.    PT/OT recommended skilled nursing facility for rehab.  Patient requested [facility] which did not have any beds.  She was reluctant to go to any other skilled nursing facility and therefore chose to go home with home health.  Patient's  is his primary caregiver.   in agreement with discharge home with home health.  Counseled excessively on medication and diet adherence to prevent recurrent hospitalizations.  Given patient's comorbidities and poor insight into illness, questionable compliance-she is at high risk of readmission.  [facility] follow-up made.  She would benefit from outpatient endocrinology, Cardiology, pulmonology and GI (per patient needs esophageal dilatation) follow-up.  Referrals made prior to discharge.    Physical Exam  Vitals reviewed.   Constitutional:       General: She is not in acute distress.  Eyes:      General: No scleral icterus.  Cardiovascular:      Rate and Rhythm: Normal rate and regular rhythm.      Heart sounds: Normal heart sounds.   Pulmonary:      Effort: Pulmonary effort is normal. No respiratory distress.      Breath sounds: CTAB     Comments: On supplemental oxygen  Abdominal:      General: There is no distension.      Palpations: Abdomen is soft.      Tenderness: There is no abdominal tenderness.   Musculoskeletal:         General: No deformity.    b/l LE edema improving (compression stockings on)  Skin:     General: Skin is warm and dry.      Findings: No rash.   Neurological:      Mental Status: She is alert and oriented to person, place, and time.   Psychiatric:         Behavior: Behavior normal.           Goals of Care Treatment Preferences:  Code Status: Full Code    Health care agent: Dangelo Hawley (spouse)  Health care agent number: [phone]    Living Will: Yes     What is most important right now is  "to focus on symptom/pain control, extending life as long as possible, even it it means sacrificing quality.  Accordingly, we have decided that the best plan to meet the patient's goals includes continuing with treatment.      Final Active Diagnoses:    Diagnosis Date Noted POA    Multifocal pneumonia [J18.9] 04/28/2023 Yes    Acute respiratory failure with hypoxia and hypercarbia [J96.01, J96.02] 03/23/2023 Yes    Adrenal insufficiency [E27.40] 01/17/2023 Yes    Debility [R53.81] 01/13/2021 Yes    Chronic diastolic heart failure [I50.32] 01/13/2020 Yes    Mixed stress and urge urinary incontinence [N39.46] 05/13/2019 Yes    Suprapubic catheter [Z93.59] 02/01/2018 Not Applicable     Chronic    Insomnia due to medical condition [G47.01] 12/08/2017 Yes     Chronic    S/P insertion of intrathecal pump [Z98.890] 03/31/2017 Not Applicable     Chronic    Lymphedema of both lower extremities [I89.0] 03/02/2017 Yes     Chronic    Primary hypothyroidism [E03.9] 02/02/2017 Yes     Chronic    Neurogenic bladder [N31.9] 09/27/2016 Yes     Chronic    Coronary artery disease of native artery with stable angina pectoris [I25.118] 08/16/2016 Yes    Narcotic dependency, continuous [F11.20] 07/18/2014 Yes     Chronic    Chronic pain syndrome [G89.4] 05/01/2013 Yes     Chronic    Major depressive disorder, recurrent, mild [F33.0] 02/21/2013 Yes    Sleep apnea [G47.30]  Yes    Generalized anxiety disorder [F41.1]  Yes      Problems Resolved During this Admission:    Diagnosis Date Noted Date Resolved POA    PRINCIPAL PROBLEM:  Sepsis with encephalopathy and septic shock [A41.9, R65.21, G93.40] 04/28/2023 05/05/2023 Yes       Discharged Condition: stable    Disposition: Home or Self Care    Follow Up:    Patient Instructions:      NEBULIZER FOR HOME USE     Order Specific Question Answer Comments   Height: 5' 5" (1.651 m)    Weight: 112.1 kg (247 lb 2.2 oz)    Does patient have medical equipment at home? CPAP    Does " "patient have medical equipment at home? oxygen    Does patient have medical equipment at home? rollator    Does patient have medical equipment at home? wheelchair    Length of need (1-99 months): 99        Significant Diagnostic Studies: Labs:   BMP:   Recent Labs   Lab 05/05/23  0240   *      K 3.9   CL 98   CO2 35*   BUN 11   CREATININE 0.8   CALCIUM 8.7    and CBC   Recent Labs   Lab 05/05/23  0240   WBC 6.82   HGB 8.3*   HCT 27.4*        Cardiac Graphics: Echocardiogram:   Transthoracic echo (TTE) complete (Cupid Only):   Results for orders placed or performed during the hospital encounter of 04/28/23   Echo Saline Bubble? Yes   Result Value Ref Range    BSA 2.2 m2    TDI SEPTAL 0.08 m/s    LV LATERAL E/E' RATIO 11.00 m/s    LV SEPTAL E/E' RATIO 13.75 m/s    LA WIDTH 3.80 cm    IVC diameter 2.41 cm    Left Ventricular Outflow Tract Mean Velocity 0.73 cm/s    Left Ventricular Outflow Tract Mean Gradient 2.61 mmHg    Pulmonary Valve Mean Velocity 0.83 m/s    TDI LATERAL 0.10 m/s    PV PEAK VELOCITY 1.43 cm/s    LVIDd 4.50 3.5 - 6.0 cm    IVS 0.90 (A) 0.6 - 1.1 cm    Posterior Wall 0.91 0.6 - 1.1 cm    LVIDs 1.60 2.1 - 4.0 cm    FS 64 28 - 44 %    LA volume 59.88 cm3    LV mass 133.82 g    LA size 3.60 cm    RVDD 2.75 cm    RV S' 0.02 cm/s    Left Ventricle Relative Wall Thickness 0.40 cm    AV mean gradient 8 mmHg    AV valve area 2.32 cm2    AV Velocity Ratio 0.65     AV index (prosthetic) 0.73     MV mean gradient 3 mmHg    MV valve area p 1/2 method 3.82 cm2    MV valve area by continuity eq 3.36 cm2    E/A ratio 1.09     Mean e' 0.09 m/s    E wave deceleration time 198.76 msec    MV "A" wave duration 148.966383636890510 msec    LVOT diameter 2.01 cm    LVOT area 3.2 cm2    LVOT peak jorge 1.23 m/s    LVOT peak VTI 32.90 cm    Ao peak jorge 1.90 m/s    Ao VTI 44.9 cm    LVOT stroke volume 104.34 cm3    AV peak gradient 14 mmHg    MV peak gradient 5 mmHg    E/E' ratio 12.22 m/s    MV Peak E Jorge " 1.10 m/s    TR Max Jorge 3.13 m/s    MV VTI 31.1 cm    MV stenosis pressure 1/2 time 57.64 ms    MV Peak A Jorge 1.01 m/s    LV Systolic Volume 29.47 mL    LV Systolic Volume Index 14.0 mL/m2    LV Diastolic Volume 81.54 mL    LV Diastolic Volume Index 38.64 mL/m2    LA Volume Index 28.4 mL/m2    LV Mass Index 63 g/m2    RA Major Axis 5.18 cm    Left Atrium Minor Axis 4.44 cm    Left Atrium Major Axis 6.13 cm    Triscuspid Valve Regurgitation Peak Gradient 39 mmHg    LA Volume Index (Mod) 29.0 mL/m2    LA volume (mod) 61.15 cm3    RA Width 3.40 cm    Bal's Biplane MOD Ejection Fraction 6 %    Right Atrial Pressure (from IVC) 3 mmHg    EF 65 %    Ao root annulus 2.60 cm    TV rest pulmonary artery pressure 42 mmHg    Narrative    · There is no evidence of intracardiac shunting.  · The left ventricle is normal in size with normal systolic function.  · The estimated ejection fraction is 65%.  · Normal left ventricular diastolic function.  · Normal right ventricular size with normal right ventricular systolic   function.  · The estimated PA systolic pressure is 42 mmHg.  · Normal central venous pressure (3 mmHg).          Pending Diagnostic Studies:     None         Medications:  Reconciled Home Medications:      Medication List      START taking these medications    albuterol-ipratropium 2.5 mg-0.5 mg/3 mL nebulizer solution  Commonly known as: DUO-NEB  Take 3 mLs by nebulization every 6 (six) hours as needed for Wheezing or Shortness of Breath. Rescue     guaiFENesin 100 mg/5 ml 100 mg/5 mL syrup  Commonly known as: ROBITUSSIN  Take 10 mLs (200 mg total) by mouth every 4 (four) hours as needed for Congestion.     hydrOXYzine 50 MG tablet  Commonly known as: ATARAX  Take 1 tablet (50 mg total) by mouth every 4 (four) hours as needed for Anxiety.     levoFLOXacin 500 MG tablet  Commonly known as: LEVAQUIN  Take 1 tablet (500 mg total) by mouth once daily.        CHANGE how you take these medications    atorvastatin 80  MG tablet  Commonly known as: LIPITOR  TAKE ONE TABLET BY MOUTH EVERY DAY  What changed: when to take this     furosemide 40 MG tablet  Commonly known as: LASIX  Take 1.5 tablets (60 mg total) by mouth once daily.  What changed: how much to take     hydrocortisone 10 MG Tab  Commonly known as: CORTEF  Take 1 tab in the morning and 0.5 tab in the afternoon  What changed:   · how much to take  · how to take this  · when to take this  · additional instructions     QUEtiapine 100 MG Tab  Commonly known as: SEROQUEL  TAKE 2 TABLETS (200 MG) BY MOUTH NIGHTLY  What changed:   · how much to take  · how to take this  · when to take this  · additional instructions        CONTINUE taking these medications    aspirin 81 MG EC tablet  Commonly known as: ECOTRIN  Take 1 tablet (81 mg total) by mouth once daily.     butalbital-acetaminophen-caffeine -40 mg -40 mg per tablet  Commonly known as: FIORICET, ESGIC  Take 1 tablet by mouth daily as needed.     docusate sodium 100 MG capsule  Commonly known as: COLACE  Take 200 mg by mouth every evening.     fludrocortisone 0.1 mg Tab  Commonly known as: FLORINEF  Take 1 tablet (100 mcg total) by mouth once daily.     FLUoxetine 20 MG capsule  Take 3 capsules (60 mg total) by mouth once daily.     fluticasone propionate 50 mcg/actuation nasal spray  Commonly known as: FLONASE  2 sprays (100 mcg total) by Each Nostril route once daily.     HYDROcodone-acetaminophen  mg per tablet  Commonly known as: NORCO  Take 1 tablet by mouth every 6 (six) hours as needed for breakthrough pain.     INTRATHECAL PAIN PUMP COMPOUND  Hydromorphone (7.5 mg/mL) infusion at 8.59 mg/day (0.3578 mg/hr) out of a total reservoir volume of 40 mL  Pump filled monthly     ketorolac 10 mg tablet  Commonly known as: TORADOL  Take 10 mg by mouth daily as needed.     levothyroxine 150 MCG tablet  Commonly known as: SYNTHROID  Take 1 tablet (150 mcg total) by mouth before breakfast.     LIDOcaine 5  %  Commonly known as: LIDODERM  Place 1 patch onto the skin once daily. Remove & Discard patch within 12 hours or as directed by MD     liothyronine 25 MCG Tab  Commonly known as: CYTOMEL  Take 1 tablet (25 mcg total) by mouth once daily.     MYRBETRIQ 50 mg Tb24  Generic drug: mirabegron  Take 1 tablet (50 mg total) by mouth once daily.     nitroGLYCERIN 0.4 MG SL tablet  Commonly known as: NITROSTAT  Place 0.4 mg under the tongue every 5 (five) minutes as needed for Chest pain.     ondansetron 4 MG Tbdl  Commonly known as: ZOFRAN-ODT  Take 4 mg by mouth every 4 to 6 hours as needed.     pantoprazole 40 MG tablet  Commonly known as: PROTONIX  Take 1 tablet (40 mg total) by mouth once daily.     potassium chloride SA 20 MEQ tablet  Commonly known as: K-DUR,KLOR-CON  Take 1 tablet (20 mEq total) by mouth once daily.     promethazine 25 MG tablet  Commonly known as: PHENERGAN  Take 25 mg by mouth every 6 (six) hours as needed for Nausea.     senna 8.6 mg tablet  Commonly known as: SENNA LAX  Take 2 tablets by mouth 2 (two) times daily.     SPIRIVA RESPIMAT 2.5 mcg/actuation inhaler  Generic drug: tiotropium bromide  Inhale 2 puffs into the lungs once daily. Controller     tiZANidine 4 MG tablet  Commonly known as: ZANAFLEX  Take 4 mg by mouth 2 (two) times daily as needed.     traZODone 300 MG tablet  Commonly known as: DESYREL  Take 1 tablet (300 mg total) by mouth every evening.     VITAMIN B-12 5,000 mcg Subl  Generic drug: cyanocobalamin (vitamin B-12)  Place 1 tablet under the tongue once daily.            Indwelling Lines/Drains at time of discharge:   Lines/Drains/Airways     Drain  Duration                Suprapubic Catheter 12/13/22 100% silicone 20 Fr. 143 days          Line  Duration                Subcutaneous Infusion Pump Abdomen (Comment) -- days                Time spent on the discharge of patient: 40 minutes         Bharti Sullivan MD  Department of Hospital Medicine  [facility]

## 2023-05-05 NOTE — PLAN OF CARE
Pt will dc with HH. SW sent HH orders via [facility]. HH agency will contact pt to schedule first apt time/date. Pts  will provide transport home. Pt made aware that her  will need to bring O2 to hospital. Pt was approved for nebulizer. Nebulizer will be delivered to bedside by [facility]. SW will continue to follow pt throughout her transitions of care and assist with any dc needs. Pt/pts  verbally completed pts choice form.     Cleared from  . Bedside Nurse and VN notified.    [facility]: YES response from [facility] re: Referral [id number] for patient in [facility] [id number] A: Yes, willing to accept patient     Future Appointments   Date Time Provider Department Center   5/9/2023  1:40 PM Brent Ceballos MD [facility] CARDIO [place]   5/11/2023 10:00 AM Efe Hickey MD [facility] ENDODIA [facility]   5/12/2023  1:00 PM Cami Romero MD [facility] PULMSVC [facility]   5/17/2023  4:00 PM Prince Vance MD [facility] GASTRO [facility]   5/23/2023  1:00 PM Imani Juarez MD [facility] IMPRI [facility]   7/7/2023 11:00 AM Mesfin Hodges II, MD [facility] IM [facility] PCW        05/05/23 0912   Final Note   Assessment Type Final Discharge Note   Anticipated Discharge Disposition [facility] Resources/Appts/Education Provided Appointments scheduled by Navigator/Coordinator   Post-Acute Status   Discharge Delays None known at this time

## 2023-05-05 NOTE — PLAN OF CARE
[facility] - Telemetry      HOME HEALTH ORDERS  FACE TO FACE ENCOUNTER    Patient Name: Tasha Hawley  YOB: 1955    PCP: Mesfin Hodges Ii, MD   [facility] [address] [facility] [address]  PCP Phone Number: [phone]  PCP Fax: [phone]    Encounter Date: 4/28/23    Admit to Home Health    Diagnoses:  Active Hospital Problems    Diagnosis  POA    Multifocal pneumonia [J18.9]  Yes    Acute respiratory failure with hypoxia and hypercarbia [J96.01, J96.02]  Yes    Adrenal insufficiency [E27.40]  Yes    Debility [R53.81]  Yes    Chronic diastolic heart failure [I50.32]  Yes    Mixed stress and urge urinary incontinence [N39.46]  Yes    Suprapubic catheter [Z93.59]  Not Applicable     Chronic    Insomnia due to medical condition [G47.01]  Yes     Chronic    S/P insertion of intrathecal pump [Z98.890]  Not Applicable     Chronic    Lymphedema of both lower extremities [I89.0]  Yes     Chronic    Primary hypothyroidism [E03.9]  Yes     Chronic    Neurogenic bladder [N31.9]  Yes     Chronic    Coronary artery disease of native artery with stable angina pectoris [I25.118]  Yes    Narcotic dependency, continuous [F11.20]  Yes     Chronic    Chronic pain syndrome [G89.4]  Yes     Chronic    Major depressive disorder, recurrent, mild [F33.0]  Yes    Sleep apnea [G47.30]  Yes    Generalized anxiety disorder [F41.1]  Yes      Resolved Hospital Problems    Diagnosis Date Resolved POA    *Sepsis with encephalopathy and septic shock [A41.9, R65.21, G93.40] 05/05/2023 Yes       Follow Up Appointments:  Future Appointments   Date Time Provider Department Center   5/9/2023  1:40 PM Brent Ceballos MD [facility] CARDIO [place]   5/11/2023 10:00 AM Efe Hickey MD [facility] ENDODIA [facility]   5/12/2023  1:00 PM Cami Romero MD [facility] PULMSVC [facility]   5/17/2023  4:00 PM Prince Vance MD [facility] GASTRO [facility]   5/23/2023  1:00 PM Imani Juarez MD [facility]I [facility]   7/7/2023 11:00 AM Mesfin  Obstetrical Discharge Form    Gestational Age: 39w3d    Antepartum complications: AMA, obesity, suspected LGA, depression    Date of Delivery: 21      Type of Delivery: LTCS    Delivered By: Aleksandra Dasilva     Baby:      Information for the patient's :  Balderrama, Baby Boy Ritchie Guilelrmo [[id number]]   APGAR One: 9     Information for the patient's :  Balderrama, Baby Dl Guillermo [[id number]]   APGAR Five: 9     Information for the patient's :  Cher Alcantara [[id number]]   Birth Weight: 8 lb 13.1 oz (4 kg)       Anesthesia: Spinal    Intrapartum complications: None    Postpartum complications: anemia and gestational hypertension    Discharge Medication:      Medication List      START taking these medications    docusate sodium 100 MG capsule  Commonly known as: COLACE  Take 1 capsule by mouth 2 times daily     ferrous sulfate 325 (65 Fe) MG tablet  Commonly known as: IRON 325  Take 1 tablet by mouth daily (with breakfast)     ibuprofen 800 MG tablet  Commonly known as: ADVIL;MOTRIN  Take 1 tablet by mouth every 6 hours as needed for Pain     oxyCODONE 5 MG immediate release tablet  Commonly known as: Roxicodone  Take 1 tablet by mouth every 6 hours as needed for Pain for up to 7 days. Intended supply: 7 days.  Take lowest dose possible to manage pain        CONTINUE taking these medications    levothyroxine 50 MCG tablet  Commonly known as: SYNTHROID     PRENATAL 1 PO           Where to Get Your Medications      You can get these medications from any pharmacy    Bring a paper prescription for each of these medications  · docusate sodium 100 MG capsule  · ferrous sulfate 325 (65 Fe) MG tablet  · ibuprofen 800 MG tablet  · oxyCODONE 5 MG immediate release tablet         Discharge Condition:  good    Discharge Date: 21    PLAN:  Follow up in 6 weeks for routine PP visit  All questions answered  D/C summary begun at delivery for D/C planning purposes, any delay in discharge from ordered D/C date due to  factors. SANTHOSH Hodges II, MD [facility]       Allergies:  Review of patient's allergies indicates:   Allergen Reactions    (d)-limonene flavor      Other reaction(s): difficult intubation  Other reaction(s): Difficulty breathing    Bactrim [sulfamethoxazole-trimethoprim] Anaphylaxis    Benadryl [diphenhydramine hcl] Shortness Of Breath    Fentanyl Itching, Nausea And Vomiting and Swelling             Imitrex [sumatriptan succinate] Shortness Of Breath    Percocet [oxycodone-acetaminophen] Shortness Of Breath    Topamax [topiramate] Shortness Of Breath    Vancomycin Anaphylaxis     Rash    Butorphanol tartrate     Darvocet a500 [propoxyphene n-acetaminophen]      Other reaction(s): Difficulty breathing    Evening primrose (oenothera biennis)     Lyrica [pregabalin] Other (See Comments)     tremors    White petrolatum-zinc     Zinc oxide-white petrolatum      Other reaction(s): Difficulty breathing    Latex, natural rubber Itching and Rash    Phenytoin Rash and Other (See Comments)     Trouble breathing       Medications: Review discharge medications with patient and family and provide education.    Current Facility-Administered Medications   Medication Dose Route Frequency Provider Last Rate Last Admin    0.9%  NaCl infusion   Intravenous PRN Nic Mistry MD 5 mL/hr at 05/01/23 0434 New Bag at 05/01/23 0434    acetaminophen tablet 1,000 mg  1,000 mg Oral Q8H PRN Nic Mistry MD   1,000 mg at 05/03/23 2343    acetaminophen tablet 650 mg  650 mg Oral Q4H PRN Nic Mistry MD   650 mg at 05/04/23 0516    albuterol-ipratropium 2.5 mg-0.5 mg/3 mL nebulizer solution 3 mL  3 mL Nebulization Q6H PRN Bharti Sullivan MD        albuterol-ipratropium 2.5 mg-0.5 mg/3 mL nebulizer solution 3 mL  3 mL Nebulization Q6H Bharti Sullivan MD   3 mL at 05/05/23 0203    aluminum-magnesium hydroxide-simethicone 200-200-20 mg/5 mL suspension 30 mL  30 mL Oral Q6H PRN Bharti Sullivan MD   30 mL at 05/05/23 0456     aspirin EC tablet 81 mg  81 mg Oral Daily Nic Mistry MD   81 mg at 05/04/23 0909    atorvastatin tablet 80 mg  80 mg Oral Daily Nic Mistry MD   80 mg at 05/04/23 0909    cefTRIAXone (ROCEPHIN) 1 g in dextrose 5 % in water (D5W) 5 % 50 mL IVPB (MB+)  1 g Intravenous Q24H iNc Mistry MD   Stopped at 05/04/23 1012    cyanocobalamin tablet 1,000 mcg  1,000 mcg Oral Daily Nic Mistry MD   1,000 mcg at 05/04/23 0909    dextrose 10% bolus 125 mL 125 mL  12.5 g Intravenous PRN Nic Mistry MD        dextrose 10% bolus 250 mL 250 mL  25 g Intravenous PRN Nic Mistry MD        dextrose 40 % gel 15,000 mg  15 g Oral PRN Nic Mistry MD        dextrose 40 % gel 30,000 mg  30 g Oral PRN Nic Mistry MD        doxycycline tablet 100 mg  100 mg Oral Q12H Nic Mistry MD   100 mg at 05/04/23 2129    enoxaparin injection 40 mg  40 mg Subcutaneous Daily Nic Mistry MD   40 mg at 05/04/23 1610    FLUoxetine capsule 60 mg  60 mg Oral Daily Nic Mistry MD   60 mg at 05/04/23 0909    fluticasone propionate 50 mcg/actuation nasal spray 100 mcg  2 spray Each Nostril Daily Nic Mistry MD   100 mcg at 05/04/23 0907    furosemide injection 40 mg  40 mg Intravenous Daily Bharti Sullivan MD   40 mg at 05/04/23 0923    glucagon (human recombinant) injection 1 mg  1 mg Intramuscular PRN Nic Mistry MD        guaiFENesin 100 mg/5 ml syrup 200 mg  200 mg Oral Q4H PRN Nic Mistry MD   200 mg at 05/03/23 0949    HYDROcodone-acetaminophen  mg per tablet 1 tablet  1 tablet Oral Q4H PRN Nic Mistry MD   1 tablet at 05/05/23 0456    hydrocortisone tablet 10 mg  10 mg Oral Daily Bharti Sullivan MD        Hydromorphone 7.5mg/ml IT Pain Pump   Intrathecal Continuous Nic Mistry MD        hydrOXYzine HCL tablet 50 mg  50 mg Oral Q4H PRN Nic Mistry MD    50 mg at 05/05/23 0456    levothyroxine tablet 150 mcg  150 mcg Oral Before breakfast Nic Mistry MD   150 mcg at 05/05/23 0621    LIDOcaine 5 % patch 1 patch  1 patch Transdermal Daily Nic Mistry MD   1 patch at 05/04/23 0919    liothyronine tablet 25 mcg  25 mcg Oral Daily Nic Mistry MD   25 mcg at 05/04/23 0909    LORazepam tablet 0.5 mg  0.5 mg Oral Q24H PRN Nic Mistry MD   0.5 mg at 05/04/23 1515    melatonin tablet 6 mg  6 mg Oral Nightly PRN Nic Mistry MD   6 mg at 05/04/23 2129    naloxone 0.4 mg/mL injection 0.02 mg  0.02 mg Intravenous PRN Nic Mistry MD        ondansetron disintegrating tablet 8 mg  8 mg Oral Q8H PRN Nic Mistry MD   8 mg at 05/01/23 1652    ondansetron injection 4 mg  4 mg Intravenous Q8H PRN Nic Mistry MD        oxybutynin 24 hr tablet 10 mg  10 mg Oral Daily Nic Mistry MD   10 mg at 05/04/23 0909    pantoprazole EC tablet 40 mg  40 mg Oral Daily Nic Mistry MD   40 mg at 05/04/23 0909    polyethylene glycol packet 17 g  17 g Oral Daily Nic Mistry MD   17 g at 04/30/23 1039    potassium chloride SA CR tablet 20 mEq  20 mEq Oral Daily Nic Mistry MD   20 mEq at 05/02/23 0938    QUEtiapine tablet 200 mg  200 mg Oral Nightly Nic Mistry MD   200 mg at 05/04/23 2129    senna tablet 2 tablet  2 tablet Oral BID Nic Mistry MD   2 tablet at 05/04/23 2129    tiotropium bromide 2.5 mcg/actuation inhaler 2 puff  2 puff Inhalation Daily Nic Mistry MD   2 puff at 05/04/23 0734    traZODone tablet 300 mg  300 mg Oral QHS Nic Mistry MD   300 mg at 05/04/23 2129     Current Discharge Medication List        START taking these medications    Details   albuterol-ipratropium (DUO-NEB) 2.5 mg-0.5 mg/3 mL nebulizer solution Take 3 mLs by nebulization every 6 (six) hours as needed for Wheezing or Shortness of  Breath. Rescue  Qty: 75 mL, Refills: 0      guaiFENesin 100 mg/5 ml (ROBITUSSIN) 100 mg/5 mL syrup Take 10 mLs (200 mg total) by mouth every 4 (four) hours as needed for Congestion.  Refills: 0      hydrOXYzine HCL (ATARAX) 50 MG tablet Take 1 tablet (50 mg total) by mouth every 4 (four) hours as needed for Anxiety.  Qty: 30 tablet, Refills: 0      levoFLOXacin (LEVAQUIN) 500 MG tablet Take 1 tablet (500 mg total) by mouth once daily.  Qty: 2 tablet, Refills: 0           CONTINUE these medications which have CHANGED    Details   fludrocortisone (FLORINEF) 0.1 mg Tab Take 1 tablet (100 mcg total) by mouth once daily.  Qty: 90 tablet, Refills: 2      furosemide (LASIX) 40 MG tablet Take 1.5 tablets (60 mg total) by mouth once daily.  Qty: 90 tablet, Refills: 3      hydrocortisone (CORTEF) 10 MG Tab Take 1 tab in the morning and 0.5 tab in the afternoon  Qty: 135 tablet, Refills: 3           CONTINUE these medications which have NOT CHANGED    Details   aspirin (ECOTRIN) 81 MG EC tablet Take 1 tablet (81 mg total) by mouth once daily.  Qty: 30 tablet, Refills: 0      atorvastatin (LIPITOR) 80 MG tablet TAKE ONE TABLET BY MOUTH EVERY DAY  Qty: 90 tablet, Refills: 3    Associated Diagnoses: Mixed hyperlipidemia      butalbital-acetaminophen-caffeine -40 mg (FIORICET, ESGIC) -40 mg per tablet Take 1 tablet by mouth daily as needed.  Qty: 15 tablet, Refills: 0      cyanocobalamin, vitamin B-12, (VITAMIN B-12) 5,000 mcg Subl Place 1 tablet under the tongue once daily.      docusate sodium (COLACE) 100 MG capsule Take 200 mg by mouth every evening.      FLUoxetine 20 MG capsule Take 3 capsules (60 mg total) by mouth once daily.  Qty: 270 capsule, Refills: 3    Associated Diagnoses: Anxiety      fluticasone propionate (FLONASE) 50 mcg/actuation nasal spray 2 sprays (100 mcg total) by Each Nostril route once daily.  Qty: 16 g, Refills: 0      HYDROcodone-acetaminophen (NORCO)  mg per tablet Take 1 tablet by  mouth every 6 (six) hours as needed for breakthrough pain.      intrathecal pain pump compound Hydromorphone (7.5 mg/mL) infusion at 8.59 mg/day (0.3578 mg/hr) out of a total reservoir volume of 40 mL  Pump filled monthly      ketorolac (TORADOL) 10 mg tablet Take 10 mg by mouth daily as needed.      levothyroxine (SYNTHROID) 150 MCG tablet Take 1 tablet (150 mcg total) by mouth before breakfast.  Qty: 30 tablet, Refills: 11      LIDOcaine (LIDODERM) 5 % Place 1 patch onto the skin once daily. Remove & Discard patch within 12 hours or as directed by MD  Refills: 0      liothyronine (CYTOMEL) 25 MCG Tab Take 1 tablet (25 mcg total) by mouth once daily.  Qty: 30 tablet, Refills: 11      mirabegron (MYRBETRIQ) 50 mg Tb24 Take 1 tablet (50 mg total) by mouth once daily.  Qty: 90 tablet, Refills: 3    Associated Diagnoses: Urge incontinence      nitroGLYCERIN (NITROSTAT) 0.4 MG SL tablet Place 0.4 mg under the tongue every 5 (five) minutes as needed for Chest pain.      ondansetron (ZOFRAN-ODT) 4 MG TbDL Take 4 mg by mouth every 4 to 6 hours as needed.      pantoprazole (PROTONIX) 40 MG tablet Take 1 tablet (40 mg total) by mouth once daily.  Qty: 90 tablet, Refills: 3    Associated Diagnoses: Gastroesophageal reflux disease, unspecified whether esophagitis present      potassium chloride SA (K-DUR,KLOR-CON) 20 MEQ tablet Take 1 tablet (20 mEq total) by mouth once daily.  Qty: 180 tablet, Refills: 3    Associated Diagnoses: Congestive heart failure, unspecified HF chronicity, unspecified heart failure type      QUEtiapine (SEROQUEL) 100 MG Tab TAKE 2 TABLETS (200 MG) BY MOUTH NIGHTLY  Qty: 180 tablet, Refills: 3    Associated Diagnoses: Insomnia due to medical condition      senna (SENNA LAX) 8.6 mg tablet Take 2 tablets by mouth 2 (two) times daily.      tiotropium bromide (SPIRIVA RESPIMAT) 2.5 mcg/actuation inhaler Inhale 2 puffs into the lungs once daily. Controller  Qty: 4 g, Refills: 0      tiZANidine (ZANAFLEX) 4  MG tablet Take 4 mg by mouth 2 (two) times daily as needed.      traZODone (DESYREL) 300 MG tablet Take 1 tablet (300 mg total) by mouth every evening.  Qty: 90 tablet, Refills: 0    Associated Diagnoses: Insomnia due to medical condition      promethazine (PHENERGAN) 25 MG tablet Take 25 mg by mouth every 6 (six) hours as needed for Nausea.               I have seen and examined this patient within the last 30 days. My clinical findings that support the need for the home health skilled services and home bound status are the following:no   Weakness/numbness causing balance and gait disturbance due to Infection and Weakness/Debility making it taxing to leave home.  Requiring assistive device to leave home due to unsteady gait caused by  Infection and Weakness/Debility.     Diet:   cardiac diet      Referrals/ Consults  Physical Therapy to evaluate and treat. Evaluate for home safety and equipment needs; Establish/upgrade home exercise program. Perform / instruct on therapeutic exercises, gait training, transfer training, and Range of Motion.  Occupational Therapy to evaluate and treat. Evaluate home environment for safety and equipment needs. Perform/Instruct on transfers, ADL training, ROM, and therapeutic exercises.   to evaluate for community resources/long-range planning.  Aide to provide assistance with personal care, ADLs, and vital signs.    Activities:   activity as tolerated    Nursing:   Agency to admit patient within 24 hours of hospital discharge unless specified on physician order or at patient request    SN to complete comprehensive assessment including routine vital signs. Instruct on disease process and s/s of complications to report to MD. Review/verify medication list sent home with the patient at time of discharge  and instruct patient/caregiver as needed. Frequency may be adjusted depending on start of care date.     Skilled nurse to perform up to 3 visits PRN for symptoms related to  diagnosis    Notify MD if SBP > 160 or < 90; DBP > 90 or < 50; HR > 120 or < 50; Temp > 101; O2 < 88%    Ok to schedule additional visits based on staff availability and patient request on consecutive days within the home health episode.    When multiple disciplines ordered:    Start of Care occurs on Sunday - Wednesday schedule remaining discipline evaluations as ordered on separate consecutive days following the start of care.    Thursday SOC -schedule subsequent evaluations Friday and Monday the following week.     Friday - Saturday SOC - schedule subsequent discipline evaluations on consecutive days starting Monday of the following week.    For all post-discharge communication and subsequent orders please contact patient's primary care physician.    Miscellaneous   Home Oxygen:  Oxygen at 3 L/min nasal canula to be used:  Continuously.    Home Health Aide:  Nursing Twice weekly, Physical Therapy Twice weekly, Occupational Therapy Twice weekly, and Home Health Aide Three times weekly    Wound Care Orders  2-layer compression wrap to BLE changed as directed. No open wounds. Bilateral heels intact with no redness. Pt to resume with compression therapy after discharge with home health.      I certify that this patient is confined to her home and needs intermittent skilled nursing care, physical therapy, and occupational therapy.

## 2023-05-05 NOTE — PT/OT/SLP PROGRESS
Occupational Therapy   Treatment    Name: Tasha Hawley  MRN: [id number]  Admitting Diagnosis:  Sepsis with encephalopathy and septic shock       Recommendations:     Discharge Recommendations: other (see comments) (post acute therapy recommended in consideration of patient's recurrent falls/ hospitalizations; however patient is scheduled to d/c home and thus recommend 24/7 supervision and home health OT PT to follow)  Discharge Equipment Recommendations:  none  Barriers to discharge:  Other (Comment) (recurrent hospitalizations; frequent falls)    Assessment:     Tasha Hawley is a 67 y.o. female with a medical diagnosis of Sepsis with encephalopathy and septic shock.  She presents with ..The primary encounter diagnosis was Pneumonia of both lungs due to infectious organism, unspecified part of lung. Diagnoses of Shortness of breath, Acute respiratory failure with hypoxia and hypercarbia, Chest pressure, Insomnia due to medical condition, and Multifocal pneumonia were also pertinent to this visit.  . Performance deficits affecting function are weakness, gait instability, impaired endurance, impaired balance, decreased lower extremity function, impaired cardiopulmonary response to activity, impaired sensation, impaired self care skills, impaired functional mobility, decreased coordination, edema, impaired skin, decreased safety awareness.     Continue OT POC. Progressed LB dressing performance to minimal assist inclusive of self-threading catheter through wide leg pants hole. Endorses anxiety but with minimal calming/regulation cues patient able to maintain participation in session. Patient is scheduled to d/c home on this date with 24/7 supervision recommended in consideration of recurrent falls/hospitalizations. In addition recommend home health OT / PT services.     Rehab Prognosis:  Fair; patient would benefit from acute skilled OT services to address these deficits and reach maximum level of function.    "    Plan:     Patient to be seen 3 x/week to address the above listed problems via self-care/home management, therapeutic activities, therapeutic exercises  Plan of Care Expires: 05/30/23  Plan of Care Reviewed with: patient    Subjective     Chief Complaint:   "I have a lot of anxiety. My body hurts.."    Patient/Family Comments/goals: Patient states desire to have help to put on her clothes.  Pain/Comfort:  Pain Rating 1: other (see comments) (indicates generalized pain throughout body (OT used method of intentional listening vs asking specifiically about pain in effot to maximize session and reduce fixation on pain))  Pain Addressed 1: Pre-medicate for activity, Distraction, Cessation of Activity  Pain Rating Post-Intervention 1: other (see comments) (does not rate)    Objective:     Communicated with: nursing prior to session.  Patient found HOB elevated with telemetry, meadows catheter, oxygen, peripheral IV upon OT entry to room.    General Precautions: Standard, fall    Orthopedic Precautions:N/A  Braces: N/A  Respiratory Status: Nasal cannula, flow 4 L/min     Occupational Performance:     Bed Mobility:    Patient completed Supine to Sit with modified independence , increased time, with therapist intentionally standing far away to reduce learned dependency on staff assist    Functional Mobility/Transfers:  Patient completed Sit <> Stand Transfer with supervision  with  rolling walker   Patient completed Bed <> Chair Transfer using Step Transfer technique with stand by assistance with rolling walker  Functional Mobility: Patient completed brief bouts of functional mobility in room with rolling walker SBA with mild cues for correction. Instructed for placement of walker in proximity. Unable to achieve upright stand posture.    Activities of Daily Living:  Grooming: independence ; stood x4 minutes while brushing teeth without assist  Upper Body Dressing: independence ; items in reach able to retrieve from near " bedside  Lower Body Dressing: minimum assistance ; increased time; collaborative verbal cueing from therapist for correct sequencing for threading catheter through wide leg pants. X1 episode of LOB requiring to return sitting eob. Education reiterated for recommendation of dressing while seated, standing with item such as bed or chair behind self      AMPA 6 Click ADL: 18    Treatment & Education:  Patient agreeable to treatment, verbalizing min anxiety. Therapeutic use of self provided. Mild redirection provided. Therapist facilitated ADL training with emphasis on real day clothes instead of hospital gown as above. Increased time provided to allow patient to problem solve and initiate tasks to reduce dependence on clinician. At end of session, patient transitioned to recliner. Decreased anxiety noted at end of session after performing gentle breathing. Reviewed principles for fall risk reduction with patient with fair reciprocation.    Patient left up in chair with all lines intact, call button in reach, chair alarm on, and nursing notified    GOALS:   Multidisciplinary Problems       Occupational Therapy Goals          Problem: Occupational Therapy    Goal Priority Disciplines Outcome Interventions   Occupational Therapy Goal     OT, PT/OT Ongoing, Progressing    Description: Goals to be met by: 05/30/23     Patient will increase functional independence with ADLs by performing:    UE Dressing with Modified [place].  LE Dressing with Minimal Assistance and Assistive Devices as needed.  Grooming while EOB with Set-up Assistance.  Toileting from bedside commode with Minimal Assistance for hygiene and clothing management.   Toilet transfer to bedside commode with Stand-by Assistance.  Upper extremity exercise program 3x10 reps per handout, with supervision.                         Time Tracking:     OT Date of Treatment: 05/05/23  OT Start Time: 0902  OT Stop Time: 0935  OT Total Time (min): 33 min    Billable  Minutes:Self Care/Home Management 20 min  Therapeutic Activity 13  min    OT/SAMANTHA: OT          5/5/2023

## 2023-05-05 NOTE — NURSING
[facility] RN met with [facility] regarding [facility] follow up upon discharge. Pt agreeable to hospital follow up .RN informed pt of scheduled appointment and that appointment will also appear on d/c AVS. Patient informed to bring portable home O2 if used and all medication bottles to [facility] follow up appointment.  [facility] information handout, appointment letter and folder provided to patient. Pt states spouse  will provide transportation to appointment.    Patient Contact info:[phone]    Person providing transportation contact info:   Dangelo Hawley (Spouse) [phone] -- [phone]         Barriers to attending visit: pt lives in [place], [place], pt frequent No shows

## 2023-05-05 NOTE — PLAN OF CARE
AVS and educational attachments shared with patient and  via [email or web address]. Discussed thoroughly. Patient and  verbalized clear understanding using teach back method. Notified bedside nurse of completion of education. At present no distress noted. Patient to be discharged via w/c with escort service and family with all of patient's belonings. Will cont to be available to patient and family and intervene prn.

## 2023-05-05 NOTE — PLAN OF CARE
VN note: VN completed AVS and attachments and notified bedside nurse, Leigha. Will cont to be available and intervene prn.

## 2023-05-05 NOTE — PLAN OF CARE
Continue OT POC. Progressed LB dressing performance to minimal assist inclusive of self-threading catheter through wide leg pants hole. Endorses anxiety but with minimal calming/regulation cues patient able to maintain participation in session. Patient is scheduled to d/c home on this date with 24/7 supervision recommended in consideration of recurrent falls/hospitalizations. In addition recommend home health OT / PT services. Note to follow.    Problem: Occupational Therapy  Goal: Occupational Therapy Goal  Description: Goals to be met by: 05/30/23     Patient will increase functional independence with ADLs by performing:    UE Dressing with Modified [place].  LE Dressing with Minimal Assistance and Assistive Devices as needed.  Grooming while EOB with Set-up Assistance.  Toileting from bedside commode with Minimal Assistance for hygiene and clothing management.   Toilet transfer to bedside commode with Stand-by Assistance.  Upper extremity exercise program 3x10 reps per handout, with supervision.    Outcome: Ongoing, Progressing

## 2023-05-05 NOTE — HOSPITAL COURSE
67-year-old female with PMH of multiple medical comorbidities including but not limited to CAD, heart failure reduced EF, chronic hypoxic respiratory failure on 3-5 L nasal cannula, adrenal insufficiency, hypothyroidism, chronic pain on intrathecal Dilaudid pump, suprapubic catheter with history of recurrent UTIs, CECI presented with shortness of breath, confusion and found to be in septic shock complicated with encephalopathy thought secondary to multifocal pneumonia with a component of adrenal insufficiency.  Shock and mentation improved with stress dose steroids, antibiotics.  She was briefly on pressors.  Also diuresed aggressively with IV Lasix.  Oxygen was weaned to baseline 3 L via NC.  Will complete a total of 10 days of antibiotics for multifocal pneumonia.  I do not think she has had a formal evaluation for adrenal insufficiency by endocrinology.  Encouraged outpatient endocrinology follow-up.    PT/OT recommended skilled nursing facility for rehab.  Patient requested [facility] which did not have any beds.  She was reluctant to go to any other skilled nursing facility and therefore chose to go home with home health.  Patient's  is his primary caregiver.   in agreement with discharge home with home health.  Counseled excessively on medication and diet adherence to prevent recurrent hospitalizations.  Given patient's comorbidities and poor insight into illness, questionable compliance-she is at high risk of readmission.  [facility] follow-up made.  She would benefit from outpatient endocrinology, Cardiology, pulmonology and GI (per patient needs esophageal dilatation) follow-up.  Referrals made prior to discharge.    Physical Exam  Vitals reviewed.   Constitutional:       General: She is not in acute distress.  Eyes:      General: No scleral icterus.  Cardiovascular:      Rate and Rhythm: Normal rate and regular rhythm.      Heart sounds: Normal heart sounds.   Pulmonary:       Effort: Pulmonary effort is normal. No respiratory distress.      Breath sounds: CTAB     Comments: On supplemental oxygen  Abdominal:      General: There is no distension.      Palpations: Abdomen is soft.      Tenderness: There is no abdominal tenderness.   Musculoskeletal:         General: No deformity.    b/l LE edema improving (compression stockings on)  Skin:     General: Skin is warm and dry.      Findings: No rash.   Neurological:      Mental Status: She is alert and oriented to person, place, and time.   Psychiatric:         Behavior: Behavior normal.

## 2023-05-05 NOTE — PT/OT/SLP PROGRESS
Physical Therapy Treatment/Discharge    Patient Name:  Tasha Hawley   MRN:  [id number]    Recommendations:     Discharge Recommendations:  (recommended post acute therapy; however, pt due to d/c home- will recommend 24/7 care and assistance with HH PT/OT)  Discharge Equipment Recommendations: none  Barriers to discharge:  recurrent hospitalizations, frequent falls    Assessment:     Tasha Hawley is a 67 y.o. female admitted with a medical diagnosis of Sepsis with encephalopathy and septic shock.  She presents with the following impairments/functional limitations: weakness, impaired endurance, impaired self care skills, impaired functional mobility, gait instability, impaired sensation, impaired balance, decreased coordination, decreased upper extremity function, decreased lower extremity function, impaired cardiopulmonary response to activity, decreased safety awareness, edema, impaired skin Patient to continue to work toward goals with HH PT/OT->being d/c'd today.    Rehab Prognosis: Fair; patient would benefit from acute skilled PT services to address these deficits and reach maximum level of function.    Recent Surgery: * No surgery found *      Plan:     During this hospitalization, patient to be seen 5 x/week to address the identified rehab impairments via gait training, therapeutic activities, therapeutic exercises, neuromuscular re-education and progress toward the following goals:    Plan of Care Expires:  05/30/23    Subjective      Patient reports going home today with .    Pain/Comfort:  Pain Rating 1:  (pt did not mention pain during this session)      Objective:     Communicated with nurse prior to session.  Patient found up in chair with telemetry, meadows catheter, oxygen, peripheral IV upon PT entry to room.     General Precautions: Standard, fall  Orthopedic Precautions: N/A  Braces: N/A  Respiratory Status: Nasal cannula, flow 4 L/min     Functional Mobility:  Transfers:     Sit to  Stand:  supervision with rolling walker  Gait: Patient amb in room~45ft using RW with SBA and minimal VCs for keeping RW within close proximity; pt with forward trunk flexion and unable to assume full upright position, slow cj, short step length; stopped at table to reach for her packet of information and place papers in the folder.      AM-PAC 6 CLICK MOBILITY  Turning over in bed (including adjusting bedclothes, sheets and blankets)?: 3  Sitting down on and standing up from a chair with arms (e.g., wheelchair, bedside commode, etc.): 3  Moving from lying on back to sitting on the side of the bed?: 3  Moving to and from a bed to a chair (including a wheelchair)?: 3  Need to walk in hospital room?: 3  Climbing 3-5 steps with a railing?: 1  Basic Mobility Total Score: 16       Treatment & Education:  Patient transferred and amb as described above; understands pacing self and pursed lip breathing    Patient left up in chair with all lines intact and call button in reach..    GOALS:   Multidisciplinary Problems       Physical Therapy Goals       Not on file              Multidisciplinary Problems (Resolved)          Problem: Physical Therapy    Goal Priority Disciplines Outcome Goal Variances Interventions   Physical Therapy Goal   (Resolved)     PT, PT/OT Met     Description: Goals to be met by: 2023     Patient will increase functional independence with mobility by performin. Supine to sit with Stand-by Assistance  2. Sit to supine with Stand-by Assistance  3. Rolling to Left and Right with Stand-by Assistance.  4. Sit to stand transfer with Stand-by Assistance-met 23  5. Bed to chair transfer with Stand-by Assistance using Rolling Walker-met 23  6. Gait  x 100 feet with Supervision using Rolling Walker.                          Time Tracking:     PT Received On: 23  PT Start Time: 1104     PT Stop Time: 1122  PT Total Time (min): 18 min     Billable Minutes: Gait Training 13 (remaining  time fulfilling pt requests)    Treatment Type: Treatment  PT/PTA: PT     Number of PTA visits since last PT visit: 0     05/05/2023

## 2023-05-06 ENCOUNTER — DOCUMENT SCAN (OUTPATIENT)
Dept: HOME HEALTH SERVICES | Facility: HOSPITAL | Age: 68
End: 2023-05-06
Payer: MEDICARE

## 2023-05-08 ENCOUNTER — TELEPHONE (OUTPATIENT)
Dept: ENDOSCOPY | Facility: HOSPITAL | Age: 68
End: 2023-05-08
Payer: MEDICARE

## 2023-05-08 NOTE — TELEPHONE ENCOUNTER
MD Parvin Roman MA  Caller: Unspecified (Today, 10:49 AM)  Yes sounds like it    She does not want to go to ER. She says she can get some liquids down. She thinks she needs an EGD

## 2023-05-08 NOTE — TELEPHONE ENCOUNTER
----- Message from Charlene Valiente sent at 5/8/2023 10:28 AM CDT -----  Regarding: Sooner Appt Needed  Contact: @[phone]  Pt requesting a call back to see if she can be seen sooner than 05/17/23 she is having problems with getting anything to go down (food and liquid).  Please call to discuss further.

## 2023-05-09 ENCOUNTER — OUTPATIENT CASE MANAGEMENT (OUTPATIENT)
Dept: ADMINISTRATIVE | Facility: OTHER | Age: 68
End: 2023-05-09
Payer: MEDICARE

## 2023-05-09 ENCOUNTER — TELEPHONE (OUTPATIENT)
Dept: INTERNAL MEDICINE | Facility: CLINIC | Age: 68
End: 2023-05-09
Payer: MEDICARE

## 2023-05-09 ENCOUNTER — TELEPHONE (OUTPATIENT)
Dept: ENDOSCOPY | Facility: HOSPITAL | Age: 68
End: 2023-05-09
Payer: MEDICARE

## 2023-05-09 ENCOUNTER — PATIENT OUTREACH (OUTPATIENT)
Dept: ADMINISTRATIVE | Facility: OTHER | Age: 68
End: 2023-05-09
Payer: MEDICARE

## 2023-05-09 DIAGNOSIS — R13.10 DYSPHAGIA, UNSPECIFIED TYPE: Primary | ICD-10-CM

## 2023-05-09 DIAGNOSIS — N31.9 NEUROGENIC BLADDER: Chronic | ICD-10-CM

## 2023-05-09 RX ORDER — OXYBUTYNIN CHLORIDE 15 MG/1
15 TABLET, EXTENDED RELEASE ORAL DAILY
Qty: 90 TABLET | Refills: 3 | OUTPATIENT
Start: 2023-05-09

## 2023-05-09 NOTE — TELEPHONE ENCOUNTER
----- Message from Jaycee Bryson sent at 5/9/2023 10:29 AM CDT -----  Contact: Self [phone]  Would like to receive medical advice.    Would they like a call back or a response via [facility]:  call back    Additional information:  Calling to speak with the nurse regarding  a denial for oxybutynin tablet

## 2023-05-09 NOTE — TELEPHONE ENCOUNTER
Care Due:                  Date            Visit Type   Department     Provider  --------------------------------------------------------------------------------                                EP -                              PRIMARY      [facility] INTERNAL  Last Visit: 01-      CARE ([facility])   GAY Hodges                              EP -                              PRIMARY      [facility] INTER[facility]  Next Visit: 07-      CARE ([facility])   GAY Hodges                                                            Last  Test          Frequency    Reason                     Performed    Due Date  --------------------------------------------------------------------------------    Lipid Panel.  12 months..  atorvastatin.............  04- 03-    Health [facility] Embedded Care Due Messages. Reference number: [id number].   5/09/2023 10:29:45 AM CDT

## 2023-05-09 NOTE — PROGRESS NOTES
This Community Health Worker (CHW) competed Social Determinant of Health (SDOH)  Questionnaire with patient/caregiver via telephone today.  Notified [facility] CM Michelle Box RN of completion.      Patient denied any SDOH needs at this time.

## 2023-05-09 NOTE — TELEPHONE ENCOUNTER
"Prince Vance MD  [facility] 21 hours ago (5:12 PM)       Procedure: EGD     Diagnosis: Dysphagia     Procedure Timing: < 1 week     *If within 4 weeks selected, please shyla as high priority*     *If greater than 12 weeks, please select "5-12 weeks" and delay sending until 2 months prior to requested date*     Provider: Any endoscopist     Location: [address]     Additional Scheduling Information:  Dysphagia pulmonary hypertension     Prep Specifications:Standard prep     Have you attached a patient to this message: yes      MD Parvin Roman MA; P [facility]  Caller: Unspecified (Yesterday, 10:49 AM)  Urgent orders for EGD placed ideally should be done on 2nd floor she has pulmonary hypertension just shy a 50 mmHg but she has dysphagia and not the healthiest person I think for airway precautions 2nd floor would be the best for her dysphagia urgently   "

## 2023-05-09 NOTE — PROGRESS NOTES
Outpatient Care Management  Initial Patient Assessment    Patient: Tasha Hawley  MRN: [id number]  Date of Service: 05/09/2023  Completed by: Michelle Box RN  Referral Date: 04/17/2023  Program: High Risk  Status: Ongoing  Effective Dates: 5/9/2023 - present  Responsible Staff: Michelle Box RN        Reason for Visit   Patient presents with    [facility] Enrollment Call       Brief Summary:  Tasha Hawley was referred by Dr. Maurice Fabian for bilateral lower extremity edema. Patient qualifies for program based on risk score of 96.8%.   Active problem list, medical, surgical and social history reviewed. Active comorbidities include hypothyroid, CHF, CAD, chronic hypoxic respiratory failure, CECI, adrenal insufficiency. Areas of need identified by patient include CHF.  Patient agrees to follow up phone call in two weeks.    Next steps:   Follow up phone call in two weeks  Follow up if weighing herself daily and writing it down  Follow up if received educational materials  Educate on low sodium diet  Educate       Disability Status  Is the patient alert and oriented (person, place, time, and situation)?: Alert and oriented x 4  Hearing Difficulty or Deaf: yes  Hearing Management: other (hearing aids)  Visual Difficulty or Blind: yes  Vision Management: Other (glasses)  Difficulty Concentrating, Remembering or Making Decisions: yes  Concentration Management:   Communication Difficulty: no  Eating/Swallowing Difficulty: yes  Eating/Swallowing: swallowing liquids; swallowing solid food  Eating/Swallowing Management: has reflux  Walking or Climbing Stairs Difficulty: yes  Walking or Climbing Stairs: ambulation difficulty, requires equipment; stair climbing difficulty, dependent; transferring difficulty, requires equipment  Mobility Management: rollator  Dressing/Bathing Difficulty: yes  Dressing/Bathing: bathing difficulty, assistance 1 person; dressing difficulty, assistance 1 person  Dressing/Bathing  Management:  helps  Toileting : Independent  Continence : Incontinence - Bowel; Incontience - Bladder  Difficulty Managing Errands Independently: yes  Errands Management:   Equipment Currently Used at Home: rollator; power chair; wheelchair; shower chair; bedside commode  Change in Functional Status Since Onset of Current Illness/Injury: no        Spiritual Beliefs  Spiritual, Cultural Beliefs, Taoist Practices, Values that Affect Care: no      Social History     Socioeconomic History    Marital status:    Tobacco Use    Smoking status: Never    Smokeless tobacco: Never   Substance and Sexual Activity    Alcohol use: Never    Drug use: No    Sexual activity: Never     Partners: Male     Social Determinants of Health     Financial Resource Strain: Medium Risk    Difficulty of Paying Living Expenses: Somewhat hard   Food Insecurity: No Food Insecurity    Worried About Running Out of Food in the Last Year: Never true    Ran Out of Food in the Last Year: Never true   Transportation Needs: Unmet Transportation Needs    Lack of Transportation (Medical): Yes    Lack of Transportation (Non-Medical): Yes   Physical Activity: Inactive    Days of Exercise per Week: 0 days    Minutes of Exercise per Session: 0 min   Stress: Stress Concern Present    Feeling of Stress : Rather much   Social Connections: Moderately Isolated    Frequency of Communication with Friends and Family: More than three times a week    Frequency of Social Gatherings with Friends and Family: More than three times a week    Attends Taoist Services: Never    Active Member of Clubs or Organizations: No    Attends Club or Organization Meetings: Never    Marital Status:    Housing Stability: Low Risk     Unable to Pay for Housing in the Last Year: No    Number of Places Lived in the Last Year: 1    Unstable Housing in the Last Year: No       Roles and Relationships  Primary Source of Support/Comfort: spouse  Name of  Support/Comfort Primary Source: Dangelo  Primary Roles/Responsibilities: retired  Secondary Source of Support/Comfort: child(uri)  Name of Support/Comfort Secondary Source: Tash      Advance Directives (For Healthcare)  Advance Directive  (If Adv Dir status is received, view document under Adv Dir in header or Chart Review Media tab): Patient does not have Advance Directive, requests information.  Patient Requests Assistance: Handouts provided        Patient Reported Insurance  Verified current insurance plan:: [employer] Advantage; Medicaid  [employer] benefits discussed:: OTC Prescription Discounts; Well Dine; Transportation; Silver Sneakers; Mail Order Pharmacy        Depression Patient Health Questionnaire 5/9/2023 9/22/2022 11/5/2020 3/12/2020 2/5/2020 7/23/2019 10/24/2018   Over the last two weeks how often have you been bothered by little interest or pleasure in doing things Not at all Nearly every day Not at all Not at all Not at all More than half the days Not at all   Over the last two weeks how often have you been bothered by feeling down, depressed or hopeless Several days Nearly every day Not at all Not at all Several days More than half the days Several days   PHQ-2 Total Score 1 6 0 0 1 4 1   Over the last two weeks how often have you been bothered by trouble falling or staying asleep, or sleeping too much - Nearly every day - - - - -   Over the last two weeks how often have you been bothered by feeling tired or having little energy - More than half the days - - - - -   Over the last two weeks how often have you been bothered by a poor appetite or overeating - Not at all - - - - -   Over the last two weeks how often have you been bothered by feeling bad about yourself - or that you are a failure or have let yourself or your family down - Not at all - - - - -   Over the last two weeks how often have you been bothered by trouble concentrating on things, such as reading the newspaper or  watching television - Not at all - - - - -   Over the last two weeks how often have you been bothered by moving or speaking so slowly that other people could have noticed. Or the opposite - being so fidgety or restless that you have been moving around a lot more than usual. - Not at all - - - - -   Over the last two weeks how often have you been bothered by thoughts that you would be better off dead, or of hurting yourself - Not at all - - - - -   If you checked off any problems, how difficult have these problems made it for you to do your work, take care of things at home or get along with other people? - Extremely difficult - - - - -   Total Score - 11 - - - - -   Interpretation - Moderate - - - - -       Learning Assessment       05/09/2023 1452 [facility] (5/9/2023 - Present)   Created by Michelle Box RN -  (Nurse) Status: Complete                 PRIMARY LEARNER     Primary Learner Name:  Tasha Hawley DB - 05/09/2023 1452    Relationship:  Patient DB - 05/09/2023 1452    Does the primary learner have any barriers to learning?:  Reading, Hearing DB - 05/09/2023 1452    What is the preferred language of the primary learner?:  English DB - 05/09/2023 1452    Is an  required?:  No DB - 05/09/2023 1452    How does the primary learner prefer to learn new concepts?:  Listening, Pictures/Video DB - 05/09/2023 1452    How often do you need to have someone help you read instructions, pamphlets, or written material from your doctor or pharmacy?:  Often DB - 05/09/2023 1452        CO-LEARNER #1     No question answered        CO-LEARNER #2     No question answered        SPECIAL TOPICS     No question answered        ANSWERED BY:     No question answered        Edit History       Michelle Box, RN -  (Nurse)   05/09/2023 1452

## 2023-05-09 NOTE — LETTER
Tasha Hawley  [address] [place] [address]    Dear Tasha Hawley,     Welcome to [facility] Outpatient Care Management Program. We are here to assist patients with multiple long-term (chronic) conditions who often need more personalized healthcare.    It was a pleasure talking with you today. My name is Kenya Box RN. I look forward to working with you as your Care Manager. I will be contacting you by telephone routinely to help coordinate care and resolve issues.    My goal is to help you function at the healthiest and highest level possible. You can contact me directly at [phone].    As an [facility] patient with [employer], some of the services we provide, at no cost to you, include:      Development of an individualized care plan with a Registered Nurse    Connection with a    Assistance from a Community Health Worker   Connection with available resources and services     Coordinate communication among your care team members    Provide coaching and education    Help you understand your doctors treatment plan   Help you obtain information about your insurance coverage.     All services provided by [facility]s Outpatient Care Managers and other care team members are coordinated with and communicated to your primary care team.      As part of your enrollment, you will be receiving education materials and more information about these services in your [facility] account, by phone, or through the mail. If you do not wish to participate or receive information, you can Opt Out by contacting our office at [phone].      Sincerely,    Kenya Box RN  [facility]   Outpatient Care Management

## 2023-05-10 ENCOUNTER — OUTPATIENT CASE MANAGEMENT (OUTPATIENT)
Dept: ADMINISTRATIVE | Facility: OTHER | Age: 68
End: 2023-05-10
Payer: MEDICARE

## 2023-05-10 NOTE — PROGRESS NOTES
Outpatient Care Management  Plan of Care Follow Up Visit    Patient: Tasha Hawley  MRN: [id number]  Date of Service: 05/10/2023  Completed by: Michelle Box RN  Referral Date: 04/17/2023    Reason for Visit   Patient presents with    Other     CM reviewed SDOH with no needs noted at this time.       Brief Summary: SDOH reviewed by this CM with no needs noted at this time.

## 2023-05-12 ENCOUNTER — TELEPHONE (OUTPATIENT)
Dept: PULMONOLOGY | Facility: CLINIC | Age: 68
End: 2023-05-12

## 2023-05-12 NOTE — TELEPHONE ENCOUNTER
Left message on patient voicemail, informing her that I have received her message. I also advised patient that if she has any questions or concerns, she may contact the office. Office number has been provided.

## 2023-05-12 NOTE — TELEPHONE ENCOUNTER
----- Message from Almita Savage sent at 5/12/2023  2:33 PM CDT -----  Type:  Sooner Apoointment Request    Caller is requesting a sooner appointment.  Caller declined first available appointment listed below.  Caller will not accept being placed on the waitlist and is requesting a message be sent to doctor.  Name of Caller:pt  When is the first available appointment?06/30/2023  Symptoms:hosp/fu  Would the patient rather a call back or a response via [facility]? call  Best Call Back Number:[phone]  Additional Information:

## 2023-05-17 ENCOUNTER — LAB VISIT (OUTPATIENT)
Dept: LAB | Facility: HOSPITAL | Age: 68
End: 2023-05-17
Attending: INTERNAL MEDICINE
Payer: MEDICARE

## 2023-05-17 ENCOUNTER — OFFICE VISIT (OUTPATIENT)
Dept: GASTROENTEROLOGY | Facility: CLINIC | Age: 68
End: 2023-05-17
Payer: MEDICARE

## 2023-05-17 VITALS
WEIGHT: 236 LBS | DIASTOLIC BLOOD PRESSURE: 69 MMHG | SYSTOLIC BLOOD PRESSURE: 128 MMHG | HEIGHT: 65 IN | HEART RATE: 66 BPM | BODY MASS INDEX: 39.32 KG/M2

## 2023-05-17 DIAGNOSIS — D50.9 IRON DEFICIENCY ANEMIA, UNSPECIFIED IRON DEFICIENCY ANEMIA TYPE: Primary | ICD-10-CM

## 2023-05-17 DIAGNOSIS — Z99.81 ON HOME OXYGEN THERAPY: ICD-10-CM

## 2023-05-17 DIAGNOSIS — R11.2 NAUSEA AND VOMITING, UNSPECIFIED VOMITING TYPE: ICD-10-CM

## 2023-05-17 DIAGNOSIS — D50.9 IRON DEFICIENCY ANEMIA, UNSPECIFIED IRON DEFICIENCY ANEMIA TYPE: ICD-10-CM

## 2023-05-17 DIAGNOSIS — R13.10 DYSPHAGIA, UNSPECIFIED TYPE: ICD-10-CM

## 2023-05-17 LAB
FERRITIN SERPL-MCNC: 46 NG/ML (ref 20–300)
HGB BLD-MCNC: 8.6 G/DL (ref 12–16)
IGA SERPL-MCNC: 230 MG/DL (ref 40–350)
IRON SERPL-MCNC: 26 UG/DL (ref 30–160)
LIPASE SERPL-CCNC: 10 U/L (ref 4–60)
SATURATED IRON: 7 % (ref 20–50)
TOTAL IRON BINDING CAPACITY: 349 UG/DL (ref 250–450)
TRANSFERRIN SERPL-MCNC: 236 MG/DL (ref 200–375)

## 2023-05-17 PROCEDURE — 83690 ASSAY OF LIPASE: CPT | Performed by: INTERNAL MEDICINE

## 2023-05-17 PROCEDURE — 3074F SYST BP LT 130 MM HG: CPT | Mod: CPTII,,, | Performed by: INTERNAL MEDICINE

## 2023-05-17 PROCEDURE — 99999 PR PBB SHADOW E&M-EST. PATIENT-LVL V: CPT | Mod: PBBFAC,,, | Performed by: INTERNAL MEDICINE

## 2023-05-17 PROCEDURE — 86364 TISS TRNSGLTMNASE EA IG CLAS: CPT | Performed by: INTERNAL MEDICINE

## 2023-05-17 PROCEDURE — 1125F PR PAIN SEVERITY QUANTIFIED, PAIN PRESENT: ICD-10-PCS | Mod: CPTII,,, | Performed by: INTERNAL MEDICINE

## 2023-05-17 PROCEDURE — 1111F PR DISCHARGE MEDS RECONCILED W/ CURRENT OUTPATIENT MED LIST: ICD-10-PCS | Mod: CPTII,,, | Performed by: INTERNAL MEDICINE

## 2023-05-17 PROCEDURE — 1111F DSCHRG MED/CURRENT MED MERGE: CPT | Mod: CPTII,,, | Performed by: INTERNAL MEDICINE

## 2023-05-17 PROCEDURE — 99215 OFFICE O/P EST HI 40 MIN: CPT | Performed by: INTERNAL MEDICINE

## 2023-05-17 PROCEDURE — 82784 ASSAY IGA/IGD/IGG/IGM EACH: CPT | Performed by: INTERNAL MEDICINE

## 2023-05-17 PROCEDURE — 3008F PR BODY MASS INDEX (BMI) DOCUMENTED: ICD-10-PCS | Mod: CPTII,,, | Performed by: INTERNAL MEDICINE

## 2023-05-17 PROCEDURE — 3078F DIAST BP <80 MM HG: CPT | Mod: CPTII,,, | Performed by: INTERNAL MEDICINE

## 2023-05-17 PROCEDURE — 3288F FALL RISK ASSESSMENT DOCD: CPT | Mod: CPTII,,, | Performed by: INTERNAL MEDICINE

## 2023-05-17 PROCEDURE — 3288F PR FALLS RISK ASSESSMENT DOCUMENTED: ICD-10-PCS | Mod: CPTII,,, | Performed by: INTERNAL MEDICINE

## 2023-05-17 PROCEDURE — 3008F BODY MASS INDEX DOCD: CPT | Mod: CPTII,,, | Performed by: INTERNAL MEDICINE

## 2023-05-17 PROCEDURE — 1125F AMNT PAIN NOTED PAIN PRSNT: CPT | Mod: CPTII,,, | Performed by: INTERNAL MEDICINE

## 2023-05-17 PROCEDURE — 84466 ASSAY OF TRANSFERRIN: CPT | Performed by: INTERNAL MEDICINE

## 2023-05-17 PROCEDURE — 1159F MED LIST DOCD IN RCRD: CPT | Mod: CPTII,,, | Performed by: INTERNAL MEDICINE

## 2023-05-17 PROCEDURE — 99204 OFFICE O/P NEW MOD 45 MIN: CPT | Mod: ,,, | Performed by: INTERNAL MEDICINE

## 2023-05-17 PROCEDURE — 1100F PTFALLS ASSESS-DOCD GE2>/YR: CPT | Mod: CPTII,,, | Performed by: INTERNAL MEDICINE

## 2023-05-17 PROCEDURE — 3078F PR MOST RECENT DIASTOLIC BLOOD PRESSURE < 80 MM HG: ICD-10-PCS | Mod: CPTII,,, | Performed by: INTERNAL MEDICINE

## 2023-05-17 PROCEDURE — 82728 ASSAY OF FERRITIN: CPT | Performed by: INTERNAL MEDICINE

## 2023-05-17 PROCEDURE — 1159F PR MEDICATION LIST DOCUMENTED IN MEDICAL RECORD: ICD-10-PCS | Mod: CPTII,,, | Performed by: INTERNAL MEDICINE

## 2023-05-17 PROCEDURE — 99999 PR PBB SHADOW E&M-EST. PATIENT-LVL V: ICD-10-PCS | Mod: PBBFAC,,, | Performed by: INTERNAL MEDICINE

## 2023-05-17 PROCEDURE — 99204 PR OFFICE/OUTPT VISIT, NEW, LEVL IV, 45-59 MIN: ICD-10-PCS | Mod: ,,, | Performed by: INTERNAL MEDICINE

## 2023-05-17 PROCEDURE — 1100F PR PT FALLS ASSESS DOC 2+ FALLS/FALL W/INJURY/YR: ICD-10-PCS | Mod: CPTII,,, | Performed by: INTERNAL MEDICINE

## 2023-05-17 PROCEDURE — 85018 HEMOGLOBIN: CPT | Performed by: INTERNAL MEDICINE

## 2023-05-17 PROCEDURE — 3074F PR MOST RECENT SYSTOLIC BLOOD PRESSURE < 130 MM HG: ICD-10-PCS | Mod: CPTII,,, | Performed by: INTERNAL MEDICINE

## 2023-05-17 NOTE — Clinical Note
Shira 2nd floor lab work today  I am going to message Dr. Ribeiro and Dahlia about setting up an urgent 2nd floor EGD next week for evaluation of her dysphagia and vomiting.  Please schedule patient return GI clinic 8 weeks telemedicine video visit

## 2023-05-17 NOTE — PATIENT INSTRUCTIONS
"Procedure: EGD    Diagnosis: Nausea/Vomiting, dysphagia history of a Nissen fundoplication, on home oxygen BMI close to 40    Procedure Timin-2 weeks    *If within 4 weeks selected, please shyla as high priority*    *If greater than 12 weeks, please select "5-12 weeks" and delay sending until 2 months prior to requested date*    Provider: Dr. Ribeiro    [place] [facility]    Additional Scheduling Information:  On home oxygen 2nd floor for airway protection also with a pain pump elevated BMI close to 40    Prep Specifications:Gastroparesis (Extended clear liquid)    Have you attached a patient to this message: yes   "

## 2023-05-17 NOTE — PROGRESS NOTES
[facility] Consultation Note    Reason for Consult:  The encounter diagnosis was Iron deficiency anemia, unspecified iron deficiency anemia type.    PCP:   Mesfin Hodges Ii   [address] / [place] [address]    Referring MD:  Mesfin Hodges Ii, Md  [address] [address]    Initial History of Present Illness (HPI):  This is a 67 y.o. female here for evaluation of intermittent solid food dysphagia and episodic vomiting patient is on home oxygen she has a pain pump she has a BMI of 39 she does ambulated home but outside the home she travels in a wheelchair she is with her  no blood in her stool she is offered a colonoscopy again but she is declined it she says never not doing that she is willing to get an EGD but she should be done on the 2nd floor for airway precautions secondary to pulmonary issues and for airway protection she has had benefit with esophageal dilation in the past will try to set her up urgently in the next 1-2 weeks for evaluation.  No chest pain shortness of breath is stable on home nasal cannula oxygen she is not certain if the food makes it to her stomach before it comes up or stays in her esophagus but she would like further evaluation but she is definitely declining colonoscopy.  She is anemic so will set her up for lab work today.  She is on pantoprazole 40 mg once daily    Abdominal pain - no  Reflux - no  Dysphagia - as above  Bowel habits - normal  GI bleeding - none  NSAID usage - ask    Interval HPI 05/17/2023:  The patient's last visit with me was on 8/14/2020.      ROS:  Constitutional: No fevers, chills, No weight loss  ENT:  As above heartburn as dysphagia no odynophagia no hoarseness  CV: No chest pain, no palpitation  Pulm: No cough, she is on home oxygen nasal cannula, no wheezing, sleep apnea  Ophtho: No vision changes  GI: see HPI  Derm: No rash, no itching  Heme: No lymphadenopathy, No easy bruising  MSK:  Chronic  musculoskeletal pain with a pain pump  : No dysuria, No hematuria  Endo: No hot or cold intolerance  Neuro: No syncope, No seizure, no strokes, history of migraines  Psych: No uncontrolled anxiety, No uncontrolled depression    Medical History:  has a past medical history of Anticoagulant long-term use, Anxiety, Arthritis, Bilateral lower extremity edema, Carotid artery occlusion, Cataract, CHF (congestive heart failure), Coronary artery disease, Depression, Fever blister, Hard of hearing, Hypokalemia (1/9/2023), Hyponatremia (2/4/2022), Hypothyroid, Iron deficiency anemia, Lumbar radiculopathy, Ocular migraine, Renal disorder, and Sleep apnea.    Surgical History:  has a past surgical history that includes Hysterectomy (1975); Back surgery; pain pump placement; Spine surgery (5-13-13); Cataract extraction w/  intraocular lens implant (Left); Spinal cord stimulator removal; Esophagogastroduodenoscopy (N/A, 5/23/2018); Tonsillectomy; Adenoidectomy; Cardiac catheterization (2016); Cystoscopic litholapaxy (N/A, 6/27/2019); Cystoscopic litholapaxy (N/A, 9/3/2019); Replacement of catheter (N/A, 10/31/2019); Cystoscopy (N/A, 7/13/2021); Injection of botulinum toxin type A (7/13/2021); Cystoscopy (11/16/2021); Injection of botulinum toxin type A (11/16/2021); Cystoscopy (7/19/2022); Cystoscopy with injection of periurethral bulking agent (7/19/2022); Injection of botulinum toxin type A (7/19/2022); Removal of bone spur of foot (Bilateral, 9/16/2022); cystoscopy,with botulinum toxin injection (N/A, 12/13/2022); insertion, suprapubic catheter (N/A, 12/13/2022); cystoscopy,with botulinum toxin injection (N/A, 3/28/2023); and Cystoscopy with injection of periurethral bulking agent (N/A, 3/28/2023).    Family History: family history includes Cancer in her father; Cancer (age of onset: 55) in her mother; Cirrhosis in her paternal aunt and paternal uncle; Colon cancer in her maternal uncle; Esophageal cancer in her father;  Heart disease in her maternal uncle; Liver disease in her paternal aunt and paternal uncle; No Known Problems in her brother, brother, maternal aunt, maternal grandfather, paternal grandfather, paternal grandmother, and sister; Parkinsonism in her maternal grandmother; Tremor in her maternal grandmother..     Social History:  reports that she has never smoked. She has never used smokeless tobacco. She reports that she does not drink alcohol and does not use drugs.    Review of patient's allergies indicates:   Allergen Reactions    (d)-limonene flavor      Other reaction(s): difficult intubation  Other reaction(s): Difficulty breathing    Bactrim [sulfamethoxazole-trimethoprim] Anaphylaxis    Benadryl [diphenhydramine hcl] Shortness Of Breath    Fentanyl Itching, Nausea And Vomiting and Swelling             Imitrex [sumatriptan succinate] Shortness Of Breath    Percocet [oxycodone-acetaminophen] Shortness Of Breath    Topamax [topiramate] Shortness Of Breath    Vancomycin Anaphylaxis     Rash    Butorphanol tartrate     Darvocet a500 [propoxyphene n-acetaminophen]      Other reaction(s): Difficulty breathing    Evening primrose (oenothera biennis)     Lyrica [pregabalin] Other (See Comments)     tremors    White petrolatum-zinc     Zinc oxide-white petrolatum      Other reaction(s): Difficulty breathing    Latex, natural rubber Itching and Rash    Phenytoin Rash and Other (See Comments)     Trouble breathing       Medication List with Changes/Refills   Current Medications    ALBUTEROL-IPRATROPIUM (DUO-NEB) 2.5 MG-0.5 MG/3 ML NEBULIZER SOLUTION    Take 3 mLs by nebulization every 6 (six) hours as needed for Wheezing or Shortness of Breath. Rescue    ASPIRIN (ECOTRIN) 81 MG EC TABLET    Take 1 tablet (81 mg total) by mouth once daily.    ATORVASTATIN (LIPITOR) 80 MG TABLET    TAKE ONE TABLET BY MOUTH EVERY DAY    BUTALBITAL-ACETAMINOPHEN-CAFFEINE -40 MG (FIORICET, ESGIC) -40 MG PER TABLET    Take 1 tablet  by mouth daily as needed.    CYANOCOBALAMIN, VITAMIN B-12, 5,000 MCG SUBL    Place 1 tablet under the tongue once daily.    DOCUSATE SODIUM (COLACE) 100 MG CAPSULE    Take 200 mg by mouth every evening.    FLUDROCORTISONE (FLORINEF) 0.1 MG TAB    Take 1 tablet (100 mcg total) by mouth once daily.    FLUOXETINE 20 MG CAPSULE    Take 3 capsules (60 mg total) by mouth once daily.    FLUTICASONE PROPIONATE (FLONASE) 50 MCG/ACTUATION NASAL SPRAY    2 sprays (100 mcg total) by Each Nostril route once daily.    FUROSEMIDE (LASIX) 40 MG TABLET    Take 1.5 tablets (60 mg total) by mouth once daily.    HYDROCODONE-ACETAMINOPHEN (NORCO)  MG PER TABLET    Take 1 tablet by mouth every 6 (six) hours as needed for breakthrough pain.    HYDROCORTISONE (CORTEF) 10 MG TAB    Take 1 tab in the morning and 0.5 tab in the afternoon    HYDROXYZINE (ATARAX) 50 MG TABLET    Take 1 tablet (50 mg total) by mouth every 4 (four) hours as needed for Anxiety.    INTRATHECAL PAIN PUMP COMPOUND    Hydromorphone (7.5 mg/mL) infusion at 8.59 mg/day (0.3578 mg/hr) out of a total reservoir volume of 40 mL  Pump filled monthly    KETOROLAC (TORADOL) 10 MG TABLET    Take 10 mg by mouth daily as needed.    LEVOFLOXACIN (LEVAQUIN) 500 MG TABLET    Take 1 tablet (500 mg total) by mouth once daily.    LEVOTHYROXINE (SYNTHROID) 150 MCG TABLET    Take 1 tablet (150 mcg total) by mouth before breakfast.    LIDOCAINE (LIDODERM) 5 %    Place 1 patch onto the skin once daily. Remove & Discard patch within 12 hours or as directed by MD    LIOTHYRONINE (CYTOMEL) 25 MCG TAB    Take 1 tablet (25 mcg total) by mouth once daily.    MIRABEGRON (MYRBETRIQ) 50 MG TB24    Take 1 tablet (50 mg total) by mouth once daily.    NITROGLYCERIN (NITROSTAT) 0.4 MG SL TABLET    Place 0.4 mg under the tongue every 5 (five) minutes as needed for Chest pain.    ONDANSETRON (ZOFRAN-ODT) 4 MG TBDL    Take 4 mg by mouth every 4 to 6 hours as needed.    PANTOPRAZOLE (PROTONIX) 40 MG  "TABLET    Take 1 tablet (40 mg total) by mouth once daily.    POTASSIUM CHLORIDE SA (K-DUR,KLOR-CON) 20 MEQ TABLET    Take 1 tablet (20 mEq total) by mouth once daily.    PROMETHAZINE (PHENERGAN) 25 MG TABLET    Take 25 mg by mouth every 6 (six) hours as needed for Nausea.    QUETIAPINE (SEROQUEL) 100 MG TAB    TAKE 2 TABLETS (200 MG) BY MOUTH NIGHTLY    SENNA (SENNA LAX) 8.6 MG TABLET    Take 2 tablets by mouth 2 (two) times daily.    TIOTROPIUM BROMIDE (SPIRIVA RESPIMAT) 2.5 MCG/ACTUATION INHALER    Inhale 2 puffs into the lungs once daily. Controller    TIZANIDINE (ZANAFLEX) 4 MG TABLET    Take 4 mg by mouth 2 (two) times daily as needed.    TRAZODONE (DESYREL) 300 MG TABLET    Take 1 tablet (300 mg total) by mouth every evening.         Objective Findings:    Vital Signs:  /69   Pulse 66   Ht 5' 5" (1.651 m)   Wt 107 kg (236 lb)   LMP  (LMP Unknown)   BMI 39.27 kg/m²   Body mass index is 39.27 kg/m².    Physical Exam:  General Appearance: Well appearing in no acute distress  Eyes:    No scleral icterus  ENT:  No lesions or masses   Lungs: CTA bilaterally, no wheezes, no rhonchi, no rales, on nasal cannula oxygen  Heart:  S1, S2 normal, no murmurs heard  Abdomen:  Obese come Non distended, soft, no guarding, no rebound, no tenderness, no appreciated ascites, no bruits, no hepatosplenomegaly,  No CVA tenderness, no appreciated hernias, no Mckeon sign no McBurney point tenderness  Musculoskeletal:  No major joint deformities  Skin: No petechiae or rash on exposed skin areas  Neurologic:  Alert and oriented x4  Psychiatric:  Normal speech mentation and affect    Labs:  Lab Results   Component Value Date    WBC 6.82 05/05/2023    HGB 8.3 (L) 05/05/2023    HCT 27.4 (L) 05/05/2023     05/05/2023    CHOL 141 04/05/2022    TRIG 66 04/05/2022    HDL 52 04/05/2022    ALT 6 (L) 04/28/2023    AST 13 04/28/2023     05/05/2023    K 3.9 05/05/2023    CL 98 05/05/2023    CREATININE 0.8 05/05/2023    BUN " 11 05/05/2023    CO2 35 (H) 05/05/2023    TSH 3.297 04/28/2023    INR 1.0 01/14/2021    HGBA1C 5.9 (H) 07/28/2022               Medical Decision Making:  History obtained from patient and patient's  in the room today  Lab work reviewed  Lab work talk given  EGD 2nd floor talk given  Colonoscopy talk given but patient has refused colonoscopy again  The                         Olympus scope GIF- ([id number]) was introduced                         through the mouth, and advanced to the second part                         of duodenum. The upper GI endoscopy was                         accomplished without difficulty. The patient                         tolerated the procedure well.   Findings:        The examined duodenum was normal.        The entire examined stomach was normal.        The cardia and gastric fundus were normal on retroflexion.        Post surgical change of a nissen fundoplication.        One extrinsic stenosis was found 35 cm from the incisors. This        stenosis was moderately severe and measured 1mm from tight wrap of        her nissen fundoplication x less than one cm (in length). The        stenosis was traversed. A TTS dilator was passed through the scope.        Dilation with a 15-16.5-18 mm balloon dilator was performed to 18 mm.   Impression:           - Normal examined duodenum.                         - Normal stomach.                         - Extrinsic narrowing of the esophagus from your                         Nissen wrap. Dilated. May need surgical take down.                         - No specimens collected.   Recommendation:       - Discharge patient to home.                         - Perform an esophagram at the next available                         appointment.                         - Perform routine esophageal manometry.                         - The findings and recommendations were discussed                         with the patient.                         - Return  to GI clinic.   Attending Participation:        I personally performed the entire procedure.   Prince Vance MD   5/23/2018       Assessment:  1. Iron deficiency anemia, unspecified iron deficiency anemia type    2.      Dysphagia with nausea vomiting   3.      Iron deficiency anemia  4.      Home oxygen dependent  5.      BMI 39    Recommendations:  1. Lab work today  2. Urgent EGD 2nd floor for cardiopulmonary protection for dysphagia nausea vomiting and anemia evaluation  3. Patient has again declined colonoscopy she says not interested.    4. Return GI clinic 8 weeks for follow-up  Follow up in about 8 weeks (around 7/12/2023).      Order summary:  Orders Placed This Encounter    Hemoglobin    Ferritin    Iron and TIBC    TISSUE TRANSGLUTAMINASE (TTG), IGA    IgA    Lipase         Thank you so much for allowing me to participate in the care of Tasha Hawley    Prince Vance MD    DISCLAIMER: This note was prepared with [employer] voice recognition transcription software. Garbled syntax, mangled or inadvertent pronouns, and other bizarre constructions may be attributed to that software system. While efforts were made to correct any mistakes made by this voice recognition program, some errors and/or omissions may remain in the note that were missed when the note was originally created.

## 2023-05-17 NOTE — Clinical Note
"Procedure: EGD  Diagnosis: Nausea/Vomiting, dysphagia history of a Nissen fundoplication, on home oxygen BMI close to 40  Procedure Timin-2 weeks  *If within 4 weeks selected, please shyla as high priority*  *If greater than 12 weeks, please select "5-12 weeks" and delay sending until 2 months prior to requested date*  Provider: Dr. Ribeiro  Location: [facility]  Additional Scheduling Information:  On home oxygen 2nd floor for airway protection also with a pain pump elevated BMI close to 40  Prep Specifications:Gastroparesis (Extended clear liquid)  Have you attached a patient to this message: yes "

## 2023-05-17 NOTE — Clinical Note
"Procedure: EGD  Diagnosis: Nausea/Vomiting, dysphagia history of a Nissen fundoplication, on home oxygen BMI close to 40  Procedure Timin-2 weeks  *If within 4 weeks selected, please shyla as high priority*  *If greater than 12 weeks, please select "5-12 weeks" and delay sending until 2 months prior to requested date*  Provider: Dr. Ribeiro  Location: [facility]  Additional Scheduling Information:  On home oxygen 2nd floor for airway protection also with a pain pump elevated BMI close to 40  Prep Specifications:Gastroparesis (Extended clear liquid)  Have you attached a patient to this message: yes "

## 2023-05-18 ENCOUNTER — PATIENT MESSAGE (OUTPATIENT)
Dept: ENDOSCOPY | Facility: HOSPITAL | Age: 68
End: 2023-05-18
Payer: MEDICARE

## 2023-05-18 ENCOUNTER — TELEPHONE (OUTPATIENT)
Dept: ENDOSCOPY | Facility: HOSPITAL | Age: 68
End: 2023-05-18
Payer: MEDICARE

## 2023-05-18 DIAGNOSIS — R11.2 NAUSEA AND VOMITING, UNSPECIFIED VOMITING TYPE: Primary | ICD-10-CM

## 2023-05-18 DIAGNOSIS — R13.10 DYSPHAGIA, UNSPECIFIED TYPE: ICD-10-CM

## 2023-05-18 NOTE — TELEPHONE ENCOUNTER
"Prep instructions reviewed and pt repeated back.    Prince Vance MD  [facility]  Procedure: EGD     Diagnosis: Nausea/Vomiting, dysphagia history of a Nissen fundoplication, on home oxygen BMI close to 40     Procedure Timin-2 weeks     *If within 4 weeks selected, please shyla as high priority*     *If greater than 12 weeks, please select "5-12 weeks" and delay sending until 2 months prior to requested date*     Provider: Dr. Ribeiro     Location: [address]     Additional Scheduling Information:  On home oxygen 2nd floor for airway protection also with a pain pump elevated BMI close to 40     Prep Specifications:Gastroparesis (Extended clear liquid)     Have you attached a patient to this message: yes   "

## 2023-05-22 ENCOUNTER — LAB VISIT (OUTPATIENT)
Dept: LAB | Facility: HOSPITAL | Age: 68
End: 2023-05-22
Attending: INTERNAL MEDICINE
Payer: MEDICARE

## 2023-05-22 ENCOUNTER — TELEPHONE (OUTPATIENT)
Dept: INTERNAL MEDICINE | Facility: CLINIC | Age: 68
End: 2023-05-22
Payer: MEDICARE

## 2023-05-22 ENCOUNTER — OFFICE VISIT (OUTPATIENT)
Dept: INTERNAL MEDICINE | Facility: CLINIC | Age: 68
End: 2023-05-22
Payer: MEDICARE

## 2023-05-22 VITALS
SYSTOLIC BLOOD PRESSURE: 120 MMHG | BODY MASS INDEX: 38.45 KG/M2 | DIASTOLIC BLOOD PRESSURE: 70 MMHG | OXYGEN SATURATION: 98 % | HEART RATE: 81 BPM | WEIGHT: 230.81 LBS | HEIGHT: 65 IN

## 2023-05-22 DIAGNOSIS — G89.4 CHRONIC PAIN SYNDROME: Primary | Chronic | ICD-10-CM

## 2023-05-22 DIAGNOSIS — E66.01 SEVERE OBESITY (BMI 35.0-39.9) WITH COMORBIDITY: ICD-10-CM

## 2023-05-22 DIAGNOSIS — I89.0 LYMPHEDEMA OF BOTH LOWER EXTREMITIES: Chronic | ICD-10-CM

## 2023-05-22 DIAGNOSIS — J43.8 OTHER EMPHYSEMA: ICD-10-CM

## 2023-05-22 DIAGNOSIS — F41.9 ANXIETY: ICD-10-CM

## 2023-05-22 DIAGNOSIS — J18.9 MULTIFOCAL PNEUMONIA: ICD-10-CM

## 2023-05-22 DIAGNOSIS — N18.31 STAGE 3A CHRONIC KIDNEY DISEASE: ICD-10-CM

## 2023-05-22 DIAGNOSIS — Z12.11 SCREENING FOR MALIGNANT NEOPLASM OF COLON: ICD-10-CM

## 2023-05-22 DIAGNOSIS — G47.01 INSOMNIA DUE TO MEDICAL CONDITION: Chronic | ICD-10-CM

## 2023-05-22 DIAGNOSIS — F11.20 NARCOTIC DEPENDENCY, CONTINUOUS: Chronic | ICD-10-CM

## 2023-05-22 LAB
BASOPHILS # BLD AUTO: 0.01 K/UL (ref 0–0.2)
BASOPHILS NFR BLD: 0.2 % (ref 0–1.9)
CHOLEST SERPL-MCNC: 122 MG/DL (ref 120–199)
CHOLEST/HDLC SERPL: 2.6 {RATIO} (ref 2–5)
DIFFERENTIAL METHOD: ABNORMAL
EOSINOPHIL # BLD AUTO: 0.2 K/UL (ref 0–0.5)
EOSINOPHIL NFR BLD: 4.7 % (ref 0–8)
ERYTHROCYTE [DISTWIDTH] IN BLOOD BY AUTOMATED COUNT: 13.7 % (ref 11.5–14.5)
HCT VFR BLD AUTO: 29.3 % (ref 37–48.5)
HDLC SERPL-MCNC: 47 MG/DL (ref 40–75)
HDLC SERPL: 38.5 % (ref 20–50)
HGB BLD-MCNC: 8.9 G/DL (ref 12–16)
IMM GRANULOCYTES # BLD AUTO: 0.01 K/UL (ref 0–0.04)
IMM GRANULOCYTES NFR BLD AUTO: 0.2 % (ref 0–0.5)
LDLC SERPL CALC-MCNC: 60.4 MG/DL (ref 63–159)
LYMPHOCYTES # BLD AUTO: 0.7 K/UL (ref 1–4.8)
LYMPHOCYTES NFR BLD: 13.3 % (ref 18–48)
MCH RBC QN AUTO: 25.9 PG (ref 27–31)
MCHC RBC AUTO-ENTMCNC: 30.4 G/DL (ref 32–36)
MCV RBC AUTO: 85 FL (ref 82–98)
MONOCYTES # BLD AUTO: 0.6 K/UL (ref 0.3–1)
MONOCYTES NFR BLD: 11 % (ref 4–15)
NEUTROPHILS # BLD AUTO: 3.6 K/UL (ref 1.8–7.7)
NEUTROPHILS NFR BLD: 70.6 % (ref 38–73)
NONHDLC SERPL-MCNC: 75 MG/DL
NRBC BLD-RTO: 0 /100 WBC
PLATELET # BLD AUTO: 255 K/UL (ref 150–450)
PMV BLD AUTO: 9.8 FL (ref 9.2–12.9)
RBC # BLD AUTO: 3.44 M/UL (ref 4–5.4)
TRIGL SERPL-MCNC: 73 MG/DL (ref 30–150)
TTG IGA SER-ACNC: 0.5 U/ML
WBC # BLD AUTO: 5.11 K/UL (ref 3.9–12.7)

## 2023-05-22 PROCEDURE — 80061 LIPID PANEL: CPT | Performed by: INTERNAL MEDICINE

## 2023-05-22 PROCEDURE — 3008F PR BODY MASS INDEX (BMI) DOCUMENTED: ICD-10-PCS | Mod: CPTII,S$GLB,, | Performed by: INTERNAL MEDICINE

## 2023-05-22 PROCEDURE — 1101F PR PT FALLS ASSESS DOC 0-1 FALLS W/OUT INJ PAST YR: ICD-10-PCS | Mod: CPTII,S$GLB,, | Performed by: INTERNAL MEDICINE

## 2023-05-22 PROCEDURE — 99214 PR OFFICE/OUTPT VISIT, EST, LEVL IV, 30-39 MIN: ICD-10-PCS | Mod: S$GLB,,, | Performed by: INTERNAL MEDICINE

## 2023-05-22 PROCEDURE — 99999 PR PBB SHADOW E&M-EST. PATIENT-LVL V: CPT | Mod: PBBFAC,,, | Performed by: INTERNAL MEDICINE

## 2023-05-22 PROCEDURE — 1125F PR PAIN SEVERITY QUANTIFIED, PAIN PRESENT: ICD-10-PCS | Mod: CPTII,S$GLB,, | Performed by: INTERNAL MEDICINE

## 2023-05-22 PROCEDURE — 3078F PR MOST RECENT DIASTOLIC BLOOD PRESSURE < 80 MM HG: ICD-10-PCS | Mod: CPTII,S$GLB,, | Performed by: INTERNAL MEDICINE

## 2023-05-22 PROCEDURE — 99215 OFFICE O/P EST HI 40 MIN: CPT | Performed by: INTERNAL MEDICINE

## 2023-05-22 PROCEDURE — 1159F PR MEDICATION LIST DOCUMENTED IN MEDICAL RECORD: ICD-10-PCS | Mod: CPTII,S$GLB,, | Performed by: INTERNAL MEDICINE

## 2023-05-22 PROCEDURE — 3288F PR FALLS RISK ASSESSMENT DOCUMENTED: ICD-10-PCS | Mod: CPTII,S$GLB,, | Performed by: INTERNAL MEDICINE

## 2023-05-22 PROCEDURE — 36415 COLL VENOUS BLD VENIPUNCTURE: CPT | Performed by: INTERNAL MEDICINE

## 2023-05-22 PROCEDURE — 99214 OFFICE O/P EST MOD 30 MIN: CPT | Mod: S$GLB,,, | Performed by: INTERNAL MEDICINE

## 2023-05-22 PROCEDURE — 1101F PT FALLS ASSESS-DOCD LE1/YR: CPT | Mod: CPTII,S$GLB,, | Performed by: INTERNAL MEDICINE

## 2023-05-22 PROCEDURE — 1159F MED LIST DOCD IN RCRD: CPT | Mod: CPTII,S$GLB,, | Performed by: INTERNAL MEDICINE

## 2023-05-22 PROCEDURE — 3288F FALL RISK ASSESSMENT DOCD: CPT | Mod: CPTII,S$GLB,, | Performed by: INTERNAL MEDICINE

## 2023-05-22 PROCEDURE — 99999 PR PBB SHADOW E&M-EST. PATIENT-LVL V: ICD-10-PCS | Mod: PBBFAC,,, | Performed by: INTERNAL MEDICINE

## 2023-05-22 PROCEDURE — 1125F AMNT PAIN NOTED PAIN PRSNT: CPT | Mod: CPTII,S$GLB,, | Performed by: INTERNAL MEDICINE

## 2023-05-22 PROCEDURE — 1160F RVW MEDS BY RX/DR IN RCRD: CPT | Mod: CPTII,S$GLB,, | Performed by: INTERNAL MEDICINE

## 2023-05-22 PROCEDURE — 1111F PR DISCHARGE MEDS RECONCILED W/ CURRENT OUTPATIENT MED LIST: ICD-10-PCS | Mod: CPTII,S$GLB,, | Performed by: INTERNAL MEDICINE

## 2023-05-22 PROCEDURE — 3074F SYST BP LT 130 MM HG: CPT | Mod: CPTII,S$GLB,, | Performed by: INTERNAL MEDICINE

## 2023-05-22 PROCEDURE — 3008F BODY MASS INDEX DOCD: CPT | Mod: CPTII,S$GLB,, | Performed by: INTERNAL MEDICINE

## 2023-05-22 PROCEDURE — 1160F PR REVIEW ALL MEDS BY PRESCRIBER/CLIN PHARMACIST DOCUMENTED: ICD-10-PCS | Mod: CPTII,S$GLB,, | Performed by: INTERNAL MEDICINE

## 2023-05-22 PROCEDURE — 1111F DSCHRG MED/CURRENT MED MERGE: CPT | Mod: CPTII,S$GLB,, | Performed by: INTERNAL MEDICINE

## 2023-05-22 PROCEDURE — 3078F DIAST BP <80 MM HG: CPT | Mod: CPTII,S$GLB,, | Performed by: INTERNAL MEDICINE

## 2023-05-22 PROCEDURE — 85025 COMPLETE CBC W/AUTO DIFF WBC: CPT | Performed by: INTERNAL MEDICINE

## 2023-05-22 PROCEDURE — 3074F PR MOST RECENT SYSTOLIC BLOOD PRESSURE < 130 MM HG: ICD-10-PCS | Mod: CPTII,S$GLB,, | Performed by: INTERNAL MEDICINE

## 2023-05-22 RX ORDER — FLUOXETINE HYDROCHLORIDE 20 MG/1
60 CAPSULE ORAL DAILY
Qty: 270 CAPSULE | Refills: 3 | Status: SHIPPED | OUTPATIENT
Start: 2023-05-22 | End: 2023-10-27 | Stop reason: SDUPTHER

## 2023-05-22 RX ORDER — TRAZODONE HYDROCHLORIDE 300 MG/1
300 TABLET ORAL NIGHTLY
Qty: 90 TABLET | Refills: 0 | Status: SHIPPED | OUTPATIENT
Start: 2023-05-22 | End: 2023-08-07 | Stop reason: SDUPTHER

## 2023-05-22 RX ORDER — QUETIAPINE FUMARATE 100 MG/1
TABLET, FILM COATED ORAL
Qty: 180 TABLET | Refills: 3 | Status: SHIPPED | OUTPATIENT
Start: 2023-05-22 | End: 2023-10-27 | Stop reason: SDUPTHER

## 2023-05-22 RX ORDER — ASPIRIN 81 MG/1
81 TABLET ORAL DAILY
Qty: 90 TABLET | Refills: 3 | COMMUNITY
Start: 2023-05-22 | End: 2024-05-21

## 2023-05-22 NOTE — PROGRESS NOTES
Subjective:       Patient ID: Tasha Hawley is a 67 y.o. female.    Chief Complaint:   Hospital Follow Up    HPI - since our last visit in January, she has been to the emergency room at least 5 times.  Admitted April 28 through May 5 for pneumonia and sepsis.  Today, her biggest complaint is pain in her legs, feet, back.  She has massive lower extremity edema to the thigh and this causes a lot of discomfort.  She is not a smoker.   is present with her today.    PMH:  Neurogenic bladder, with recurrent UTI's. Followed by urogyn (Milena).  Suprapubic catheter  CAD, with ACS in Jan 2016 - subtot mid LAD lesion without PCI; ischemic CM with EF 40% and chronic LE edema. Followed by [facility]  Chronic insomnia  Lymphedema bilateral LE's  Intermittent nausea  Chronic low back pain with narcotic use.  Indwelling narcotic pump  History CVA with dysarthria  Hypothyroidism, stable  CECI, not on CPAP  Anxiety and Depression  Chronic constipation, d/t ongoing narcotic use.     Meds: Reviewed and reconciled in EPIC with patient during visit today.     Review of Systems   Constitutional:  Negative for fever.   HENT:  Negative for congestion.    Respiratory:  Negative for shortness of breath.    Cardiovascular:  Positive for leg swelling. Negative for chest pain.   Gastrointestinal:  Negative for abdominal pain.   Genitourinary:  Negative for difficulty urinating.   Musculoskeletal:  Positive for arthralgias, back pain and myalgias.   Skin:  Negative for rash.   Neurological:  Negative for headaches.   Psychiatric/Behavioral:  Positive for sleep disturbance.      Objective:      Physical Exam  Vitals reviewed.   Constitutional:       Appearance: She is well-developed.   HENT:      Head: Normocephalic and atraumatic.   Pulmonary:      Effort: Pulmonary effort is normal.   Musculoskeletal:      Right lower leg: Edema present.      Left lower leg: Edema present.   Skin:     Findings: No erythema.   Neurological:       Mental Status: She is alert. Mental status is at baseline.   Psychiatric:         Mood and Affect: Mood normal.       Assessment:       1. Chronic pain syndrome    2. Narcotic dependency, continuous    3. Lymphedema of both lower extremities    4. Multifocal pneumonia    5. Severe obesity (BMI 35.0-39.9) with comorbidity    6. Other emphysema    7. Stage 3a chronic kidney disease    8. Anxiety    9. Insomnia due to medical condition    10. Screening for malignant neoplasm of colon        Plan:       Tasha was seen today for hospital follow up.    Diagnoses and all orders for this visit:    Chronic pain syndrome - on a large amount of medications.  I'm not comfortable adjusting this upwards.  Will defer to pain management  -     aspirin (ECOTRIN) 81 MG EC tablet; Take 1 tablet (81 mg total) by mouth once daily.    Narcotic dependency, continuous    Lymphedema of both lower extremities - this is longstanding, but MUCH worse than I've ever seen.  Continue present medications, wrapping of legs.  Walking will help.  PLEASE bring pill bottles at next visit so I can make sure the meds are right    Multifocal pneumonia - resolved; stay on oxygen for now    Severe obesity (BMI 35.0-39.9) with comorbidity  -     Lipid panel; Future    Other emphysema    Stage 3a chronic kidney disease - stable.  Labs today  -     CBC Auto Differential; Future    Anxiety - stable on treatment.  refills  -     FLUoxetine 20 MG capsule; Take 3 capsules (60 mg total) by mouth once daily.    Insomnia due to medical condition - this regimen works, though it is a lot of medication.  Refills provided  -     QUEtiapine (SEROQUEL) 100 MG Tab; TAKE 2 TABLETS (200 MG) BY MOUTH NIGHTLY  -     traZODone (DESYREL) 300 MG tablet; Take 1 tablet (300 mg total) by mouth every evening.    Screening for malignant neoplasm of colon  -     Fecal Immunochemical Test (iFOBT); Future    Rtc prn, or in a month with pill bottles.    PAPO Hodges MD MPH  Staff  Internist

## 2023-05-24 ENCOUNTER — HOSPITAL ENCOUNTER (OUTPATIENT)
Facility: HOSPITAL | Age: 68
Discharge: HOME | End: 2023-05-30
Attending: EMERGENCY MEDICINE | Admitting: STUDENT IN AN ORGANIZED HEALTH CARE EDUCATION/TRAINING PROGRAM
Payer: MEDICARE

## 2023-05-24 DIAGNOSIS — R52 INTRACTABLE PAIN: Primary | ICD-10-CM

## 2023-05-24 DIAGNOSIS — I89.0 LYMPHEDEMA: ICD-10-CM

## 2023-05-24 DIAGNOSIS — R07.9 CHEST PAIN, UNSPECIFIED TYPE: ICD-10-CM

## 2023-05-24 DIAGNOSIS — M79.604 LEG PAIN, BILATERAL: ICD-10-CM

## 2023-05-24 DIAGNOSIS — I50.9 CONGESTIVE HEART FAILURE, UNSPECIFIED HF CHRONICITY, UNSPECIFIED HEART FAILURE TYPE: ICD-10-CM

## 2023-05-24 DIAGNOSIS — R06.02 SHORTNESS OF BREATH: ICD-10-CM

## 2023-05-24 DIAGNOSIS — K21.9 GASTROESOPHAGEAL REFLUX DISEASE, UNSPECIFIED WHETHER ESOPHAGITIS PRESENT: ICD-10-CM

## 2023-05-24 DIAGNOSIS — M79.605 LEG PAIN, BILATERAL: ICD-10-CM

## 2023-05-24 DIAGNOSIS — R07.9 CHEST PAIN: ICD-10-CM

## 2023-05-24 LAB
ALBUMIN SERPL BCP-MCNC: 3 G/DL (ref 3.5–5.2)
ALP SERPL-CCNC: 121 U/L (ref 55–135)
ALT SERPL W/O P-5'-P-CCNC: 7 U/L (ref 10–44)
ANION GAP SERPL CALC-SCNC: 9 MMOL/L (ref 8–16)
AST SERPL-CCNC: 13 U/L (ref 10–40)
BASOPHILS # BLD AUTO: 0.01 K/UL (ref 0–0.2)
BASOPHILS NFR BLD: 0.2 % (ref 0–1.9)
BILIRUB SERPL-MCNC: 0.3 MG/DL (ref 0.1–1)
BNP SERPL-MCNC: 16 PG/ML (ref 0–99)
BUN SERPL-MCNC: 12 MG/DL (ref 8–23)
CALCIUM SERPL-MCNC: 9.3 MG/DL (ref 8.7–10.5)
CHLORIDE SERPL-SCNC: 96 MMOL/L (ref 95–110)
CO2 SERPL-SCNC: 33 MMOL/L (ref 23–29)
CREAT SERPL-MCNC: 0.9 MG/DL (ref 0.5–1.4)
DIFFERENTIAL METHOD: ABNORMAL
EOSINOPHIL # BLD AUTO: 0.3 K/UL (ref 0–0.5)
EOSINOPHIL NFR BLD: 6.3 % (ref 0–8)
ERYTHROCYTE [DISTWIDTH] IN BLOOD BY AUTOMATED COUNT: 13.8 % (ref 11.5–14.5)
EST. GFR  (NO RACE VARIABLE): >60 ML/MIN/1.73 M^2
FIO2: 21 %
GLUCOSE SERPL-MCNC: 99 MG/DL (ref 70–110)
HCT VFR BLD AUTO: 27.6 % (ref 37–48.5)
HGB BLD-MCNC: 8.5 G/DL (ref 12–16)
IMM GRANULOCYTES # BLD AUTO: 0.02 K/UL (ref 0–0.04)
IMM GRANULOCYTES NFR BLD AUTO: 0.4 % (ref 0–0.5)
LYMPHOCYTES # BLD AUTO: 0.9 K/UL (ref 1–4.8)
LYMPHOCYTES NFR BLD: 17.2 % (ref 18–48)
MAGNESIUM SERPL-MCNC: 2 MG/DL (ref 1.6–2.6)
MCH RBC QN AUTO: 25.8 PG (ref 27–31)
MCHC RBC AUTO-ENTMCNC: 30.8 G/DL (ref 32–36)
MCV RBC AUTO: 84 FL (ref 82–98)
MONOCYTES # BLD AUTO: 0.6 K/UL (ref 0.3–1)
MONOCYTES NFR BLD: 12.5 % (ref 4–15)
NEUTROPHILS # BLD AUTO: 3.3 K/UL (ref 1.8–7.7)
NEUTROPHILS NFR BLD: 63.4 % (ref 38–73)
NRBC BLD-RTO: 0 /100 WBC
PCO2 BLDA: 70.2 MMHG (ref 35–45)
PH SMN: 7.37 [PH] (ref 7.35–7.45)
PLATELET # BLD AUTO: 227 K/UL (ref 150–450)
PLATELET BLD QL SMEAR: ABNORMAL
PMV BLD AUTO: 10 FL (ref 9.2–12.9)
PO2 BLDA: 24.3 MMHG (ref 40–60)
POC BASE DEFICIT: 12.8 MMOL/L (ref -2–2)
POC HCO3: 40.2 MMOL/L (ref 24–28)
POC PERFORMED BY: ABNORMAL
POC SATURATED O2: 35.2 % (ref 95–100)
POTASSIUM SERPL-SCNC: 3.6 MMOL/L (ref 3.5–5.1)
PROT SERPL-MCNC: 6.9 G/DL (ref 6–8.4)
RBC # BLD AUTO: 3.3 M/UL (ref 4–5.4)
SODIUM SERPL-SCNC: 138 MMOL/L (ref 136–145)
SPECIMEN SOURCE: ABNORMAL
TROPONIN I SERPL DL<=0.01 NG/ML-MCNC: <0.006 NG/ML (ref 0–0.03)
WBC # BLD AUTO: 5.12 K/UL (ref 3.9–12.7)

## 2023-05-24 PROCEDURE — 96372 THER/PROPH/DIAG INJ SC/IM: CPT | Mod: 59 | Performed by: NURSE PRACTITIONER

## 2023-05-24 PROCEDURE — 25000003 PHARM REV CODE 250: Performed by: EMERGENCY MEDICINE

## 2023-05-24 PROCEDURE — 93010 ELECTROCARDIOGRAM REPORT: CPT | Mod: ,,, | Performed by: INTERNAL MEDICINE

## 2023-05-24 PROCEDURE — 83735 ASSAY OF MAGNESIUM: CPT | Performed by: EMERGENCY MEDICINE

## 2023-05-24 PROCEDURE — 84484 ASSAY OF TROPONIN QUANT: CPT | Performed by: EMERGENCY MEDICINE

## 2023-05-24 PROCEDURE — G0378 HOSPITAL OBSERVATION PER HR: HCPCS

## 2023-05-24 PROCEDURE — 25000003 PHARM REV CODE 250: Performed by: NURSE PRACTITIONER

## 2023-05-24 PROCEDURE — 93010 EKG 12-LEAD: ICD-10-PCS | Mod: ,,, | Performed by: INTERNAL MEDICINE

## 2023-05-24 PROCEDURE — 93005 ELECTROCARDIOGRAM TRACING: CPT

## 2023-05-24 PROCEDURE — 83880 ASSAY OF NATRIURETIC PEPTIDE: CPT | Performed by: EMERGENCY MEDICINE

## 2023-05-24 PROCEDURE — 85025 COMPLETE CBC W/AUTO DIFF WBC: CPT | Performed by: EMERGENCY MEDICINE

## 2023-05-24 PROCEDURE — 63600175 PHARM REV CODE 636 W HCPCS: Performed by: EMERGENCY MEDICINE

## 2023-05-24 PROCEDURE — 96375 TX/PRO/DX INJ NEW DRUG ADDON: CPT

## 2023-05-24 PROCEDURE — 99285 EMERGENCY DEPT VISIT HI MDM: CPT | Mod: 25

## 2023-05-24 PROCEDURE — 96374 THER/PROPH/DIAG INJ IV PUSH: CPT

## 2023-05-24 PROCEDURE — 63600175 PHARM REV CODE 636 W HCPCS: Performed by: NURSE PRACTITIONER

## 2023-05-24 PROCEDURE — 80053 COMPREHEN METABOLIC PANEL: CPT | Performed by: EMERGENCY MEDICINE

## 2023-05-24 RX ORDER — HYDROCORTISONE 5 MG/1
5 TABLET ORAL NIGHTLY
Status: DISCONTINUED | OUTPATIENT
Start: 2023-05-24 | End: 2023-05-30 | Stop reason: HOSPADM

## 2023-05-24 RX ORDER — DEXTROSE 40 %
15 GEL (GRAM) ORAL
Status: DISCONTINUED | OUTPATIENT
Start: 2023-05-24 | End: 2023-05-30 | Stop reason: HOSPADM

## 2023-05-24 RX ORDER — ONDANSETRON 4 MG/1
8 TABLET, ORALLY DISINTEGRATING ORAL EVERY 8 HOURS PRN
Status: DISCONTINUED | OUTPATIENT
Start: 2023-05-24 | End: 2023-05-30 | Stop reason: HOSPADM

## 2023-05-24 RX ORDER — NALOXONE HCL 0.4 MG/ML
0.02 VIAL (ML) INJECTION
Status: DISCONTINUED | OUTPATIENT
Start: 2023-05-24 | End: 2023-05-30 | Stop reason: HOSPADM

## 2023-05-24 RX ORDER — DEXTROSE 40 %
30 GEL (GRAM) ORAL
Status: DISCONTINUED | OUTPATIENT
Start: 2023-05-24 | End: 2023-05-30 | Stop reason: HOSPADM

## 2023-05-24 RX ORDER — LEVOTHYROXINE SODIUM 75 UG/1
150 TABLET ORAL
Status: DISCONTINUED | OUTPATIENT
Start: 2023-05-25 | End: 2023-05-30 | Stop reason: HOSPADM

## 2023-05-24 RX ORDER — MORPHINE SULFATE 4 MG/ML
4 INJECTION, SOLUTION INTRAMUSCULAR; INTRAVENOUS
Status: COMPLETED | OUTPATIENT
Start: 2023-05-24 | End: 2023-05-24

## 2023-05-24 RX ORDER — HYDROCORTISONE 10 MG/1
5 TABLET ORAL NIGHTLY
COMMUNITY
End: 2023-06-12 | Stop reason: SDUPTHER

## 2023-05-24 RX ORDER — SODIUM CHLORIDE 0.9 % (FLUSH) 0.9 %
3 SYRINGE (ML) INJECTION EVERY 12 HOURS PRN
Status: DISCONTINUED | OUTPATIENT
Start: 2023-05-24 | End: 2023-05-30 | Stop reason: HOSPADM

## 2023-05-24 RX ORDER — ATORVASTATIN CALCIUM 40 MG/1
80 TABLET, FILM COATED ORAL DAILY
Status: DISCONTINUED | OUTPATIENT
Start: 2023-05-25 | End: 2023-05-30 | Stop reason: HOSPADM

## 2023-05-24 RX ORDER — LIOTHYRONINE SODIUM 25 UG/1
25 TABLET ORAL DAILY
Status: DISCONTINUED | OUTPATIENT
Start: 2023-05-25 | End: 2023-05-30 | Stop reason: HOSPADM

## 2023-05-24 RX ORDER — QUETIAPINE FUMARATE 100 MG/1
200 TABLET, FILM COATED ORAL NIGHTLY
Status: DISCONTINUED | OUTPATIENT
Start: 2023-05-24 | End: 2023-05-30 | Stop reason: HOSPADM

## 2023-05-24 RX ORDER — GLUCAGON 1 MG
1 KIT INJECTION
Status: DISCONTINUED | OUTPATIENT
Start: 2023-05-24 | End: 2023-05-30 | Stop reason: HOSPADM

## 2023-05-24 RX ORDER — ASPIRIN 81 MG/1
81 TABLET ORAL DAILY
Status: DISCONTINUED | OUTPATIENT
Start: 2023-05-25 | End: 2023-05-30 | Stop reason: HOSPADM

## 2023-05-24 RX ORDER — OXYBUTYNIN CHLORIDE 5 MG/1
10 TABLET, EXTENDED RELEASE ORAL DAILY
Status: DISCONTINUED | OUTPATIENT
Start: 2023-05-25 | End: 2023-05-30 | Stop reason: HOSPADM

## 2023-05-24 RX ORDER — HYDROCODONE BITARTRATE AND ACETAMINOPHEN 10; 325 MG/1; MG/1
1 TABLET ORAL
Status: COMPLETED | OUTPATIENT
Start: 2023-05-24 | End: 2023-05-24

## 2023-05-24 RX ORDER — TRAZODONE HYDROCHLORIDE 100 MG/1
300 TABLET ORAL NIGHTLY
Status: DISCONTINUED | OUTPATIENT
Start: 2023-05-24 | End: 2023-05-30 | Stop reason: HOSPADM

## 2023-05-24 RX ORDER — DOCUSATE SODIUM 100 MG/1
200 CAPSULE, LIQUID FILLED ORAL NIGHTLY
Status: DISCONTINUED | OUTPATIENT
Start: 2023-05-24 | End: 2023-05-30 | Stop reason: HOSPADM

## 2023-05-24 RX ORDER — HYDROCODONE BITARTRATE AND ACETAMINOPHEN 10; 325 MG/1; MG/1
1 TABLET ORAL EVERY 6 HOURS PRN
Status: DISCONTINUED | OUTPATIENT
Start: 2023-05-24 | End: 2023-05-30 | Stop reason: HOSPADM

## 2023-05-24 RX ORDER — TALC
6 POWDER (GRAM) TOPICAL NIGHTLY PRN
Status: DISCONTINUED | OUTPATIENT
Start: 2023-05-24 | End: 2023-05-30 | Stop reason: HOSPADM

## 2023-05-24 RX ORDER — FLUOXETINE HYDROCHLORIDE 20 MG/1
60 CAPSULE ORAL DAILY
Status: DISCONTINUED | OUTPATIENT
Start: 2023-05-25 | End: 2023-05-30 | Stop reason: HOSPADM

## 2023-05-24 RX ORDER — FUROSEMIDE 10 MG/ML
60 INJECTION INTRAMUSCULAR; INTRAVENOUS
Status: DISCONTINUED | OUTPATIENT
Start: 2023-05-24 | End: 2023-05-29

## 2023-05-24 RX ORDER — SENNOSIDES 8.6 MG/1
2 TABLET ORAL 2 TIMES DAILY
Status: DISCONTINUED | OUTPATIENT
Start: 2023-05-24 | End: 2023-05-30 | Stop reason: HOSPADM

## 2023-05-24 RX ORDER — HYDROCORTISONE 5 MG/1
10 TABLET ORAL EVERY MORNING
Status: DISCONTINUED | OUTPATIENT
Start: 2023-05-25 | End: 2023-05-30 | Stop reason: HOSPADM

## 2023-05-24 RX ORDER — NITROGLYCERIN 0.4 MG/1
0.4 TABLET SUBLINGUAL EVERY 5 MIN PRN
Status: DISCONTINUED | OUTPATIENT
Start: 2023-05-24 | End: 2023-05-30 | Stop reason: HOSPADM

## 2023-05-24 RX ORDER — POTASSIUM CHLORIDE 20 MEQ/1
40 TABLET, EXTENDED RELEASE ORAL ONCE
Status: DISCONTINUED | OUTPATIENT
Start: 2023-05-24 | End: 2023-05-25

## 2023-05-24 RX ORDER — ENOXAPARIN SODIUM 100 MG/ML
40 INJECTION SUBCUTANEOUS EVERY 24 HOURS
Status: DISCONTINUED | OUTPATIENT
Start: 2023-05-24 | End: 2023-05-30 | Stop reason: HOSPADM

## 2023-05-24 RX ORDER — ONDANSETRON 2 MG/ML
4 INJECTION INTRAMUSCULAR; INTRAVENOUS EVERY 8 HOURS PRN
Status: DISCONTINUED | OUTPATIENT
Start: 2023-05-24 | End: 2023-05-30 | Stop reason: HOSPADM

## 2023-05-24 RX ORDER — IPRATROPIUM BROMIDE AND ALBUTEROL SULFATE 2.5; .5 MG/3ML; MG/3ML
3 SOLUTION RESPIRATORY (INHALATION) EVERY 6 HOURS PRN
Status: DISCONTINUED | OUTPATIENT
Start: 2023-05-24 | End: 2023-05-30 | Stop reason: HOSPADM

## 2023-05-24 RX ORDER — LIDOCAINE 50 MG/G
1 PATCH TOPICAL DAILY
Status: DISCONTINUED | OUTPATIENT
Start: 2023-05-25 | End: 2023-05-30 | Stop reason: HOSPADM

## 2023-05-24 RX ORDER — TIZANIDINE 4 MG/1
4 TABLET ORAL 2 TIMES DAILY PRN
Status: DISCONTINUED | OUTPATIENT
Start: 2023-05-24 | End: 2023-05-30 | Stop reason: HOSPADM

## 2023-05-24 RX ORDER — HYDROXYZINE HYDROCHLORIDE 25 MG/1
50 TABLET, FILM COATED ORAL EVERY 4 HOURS PRN
Status: DISCONTINUED | OUTPATIENT
Start: 2023-05-24 | End: 2023-05-30 | Stop reason: HOSPADM

## 2023-05-24 RX ADMIN — HYDROCODONE BITARTRATE AND ACETAMINOPHEN 1 TABLET: 10; 325 TABLET ORAL at 08:05

## 2023-05-24 RX ADMIN — DOCUSATE SODIUM 200 MG: 100 CAPSULE, LIQUID FILLED ORAL at 08:05

## 2023-05-24 RX ADMIN — QUETIAPINE FUMARATE 200 MG: 100 TABLET ORAL at 08:05

## 2023-05-24 RX ADMIN — MORPHINE SULFATE 4 MG: 4 INJECTION INTRAVENOUS at 06:05

## 2023-05-24 RX ADMIN — FUROSEMIDE 60 MG: 10 INJECTION, SOLUTION INTRAMUSCULAR; INTRAVENOUS at 08:05

## 2023-05-24 RX ADMIN — ENOXAPARIN SODIUM 40 MG: 40 INJECTION SUBCUTANEOUS at 08:05

## 2023-05-24 RX ADMIN — HYDROCODONE BITARTRATE AND ACETAMINOPHEN 1 TABLET: 10; 325 TABLET ORAL at 04:05

## 2023-05-24 RX ADMIN — SENNOSIDES 2 TABLET: 8.6 TABLET, FILM COATED ORAL at 08:05

## 2023-05-24 NOTE — ED PROVIDER NOTES
Encounter Date: 5/24/2023       History     Chief Complaint   Patient presents with    Pain     Arrived by [place] [facility] with complaints of increased pain to bilateral hips and legs. Patient has swelling to both legs with weeping. Patient on lasix at home. Took her medical marijuana gummy prior to EMS leaving home. Recently diagnosed with pneumonia and finished coarse of antibiotics. Denies change in SOB. On her home oxygen. 4 L NC which is her home oxygen.     66 y/o w/ below pmh c/o severe ble pain - chronic but worsening. Has known lymphedema but swelling acutely worse despite taking lasix. Also c/o chronic b lower back pain, cp, and sob. Recent admission (reviewed below).         Review of patient's allergies indicates:   Allergen Reactions    (d)-limonene flavor      Other reaction(s): difficult intubation  Other reaction(s): Difficulty breathing    Bactrim [sulfamethoxazole-trimethoprim] Anaphylaxis    Benadryl [diphenhydramine hcl] Shortness Of Breath    Fentanyl Itching, Nausea And Vomiting and Swelling             Imitrex [sumatriptan succinate] Shortness Of Breath    Percocet [oxycodone-acetaminophen] Shortness Of Breath    Topamax [topiramate] Shortness Of Breath    Vancomycin Anaphylaxis     Rash    Butorphanol tartrate     Darvocet a500 [propoxyphene n-acetaminophen]      Other reaction(s): Difficulty breathing    Evening primrose (oenothera biennis)     Lyrica [pregabalin] Other (See Comments)     tremors    White petrolatum-zinc     Zinc oxide-white petrolatum      Other reaction(s): Difficulty breathing    Latex, natural rubber Itching and Rash    Phenytoin Rash and Other (See Comments)     Trouble breathing     Past Medical History:   Diagnosis Date    Anxiety     Arthritis     Bilateral lower extremity edema     severe chronic    Carotid artery occlusion     Cataract     CHF (congestive heart failure)     Coronary artery disease     subtotalled LAD with collateral    Depression     Fever blister      Hard of hearing     Hypokalemia 01/09/2023    Hyponatremia 02/04/2022    Hypothyroid     Iron deficiency anemia     Lumbar radiculopathy     with chronic pain    Ocular migraine     Other emphysema 05/22/2023    Renal disorder     Sleep apnea     cpap     Past Surgical History:   Procedure Laterality Date    ADENOIDECTOMY      BACK SURGERY      x 12    CARDIAC CATHETERIZATION  2016    subtotalled LAD with right to left collaterals    CATARACT EXTRACTION W/  INTRAOCULAR LENS IMPLANT Left     Dr Coleman     CYSTOSCOPIC LITHOLAPAXY N/A 6/27/2019    Procedure: CYSTOLITHOLAPAXY;  Surgeon: Shireen Mayo MD;  Location: [facility] OR 2ND FLR;  Service: Urology;  Laterality: N/A;    CYSTOSCOPIC LITHOLAPAXY N/A 9/3/2019    Procedure: CYSTOLITHOLAPAXY;  Surgeon: Shireen Mayo MD;  Location: [facility] OR 2ND FLR;  Service: Urology;  Laterality: N/A;    CYSTOSCOPY N/A 7/13/2021    Procedure: CYSTOSCOPY;  Surgeon: Shireen Mayo MD;  Location: [facility] OR 1ST FLR;  Service: Urology;  Laterality: N/A;    CYSTOSCOPY  11/16/2021    Procedure: CYSTOSCOPY;  Surgeon: Shireen Mayo MD;  Location: [facility] OR 1ST FLR;  Service: Urology;;    CYSTOSCOPY  7/19/2022    Procedure: CYSTOSCOPY;  Surgeon: Shireen Mayo MD;  [facility];  Service: Urology;;    CYSTOSCOPY WITH INJECTION OF PERIURETHRAL BULKING AGENT  7/19/2022    Procedure: CYSTOSCOPY, WITH PERIURETHRAL BULKING AGENT INJECTION-MACROPLASTIQUE;  Surgeon: Shireen Mayo MD;  Location: [facility] OR 1ST FLR;  Service: Urology;;    CYSTOSCOPY WITH INJECTION OF PERIURETHRAL BULKING AGENT N/A 3/28/2023    Procedure: CYSTOSCOPY, WITH PERIURETHRAL BULKING AGENT INJECTION;  Surgeon: Shireen Mayo MD;  Location: [facility] OR 1ST FLR;  Service: Urology;  Laterality: N/A;  [place]    CYSTOSCOPY,WITH BOTULINUM TOXIN INJECTION N/A 12/13/2022    Procedure: CYSTOSCOPY,WITH BOTULINUM TOXIN INJECTION;  Surgeon: Shireen Mayo MD;  [facility];  Service: Urology;   Laterality: N/A;  300 U    CYSTOSCOPY,WITH BOTULINUM TOXIN INJECTION N/A 3/28/2023    Procedure: CYSTOSCOPY,WITH BOTULINUM TOXIN INJECTION;  Surgeon: Shireen Mayo MD;  Location: [facility] OR 1ST FLR;  Service: Urology;  Laterality: N/A;  45 MIN.    300 UNITS    ESOPHAGOGASTRODUODENOSCOPY N/A 5/23/2018    Procedure: ESOPHAGOGASTRODUODENOSCOPY (EGD);  Surgeon: Prince Vance MD;  Location: [facility] ENDO (4TH FLR);  Service: Endoscopy;  Laterality: N/A;  r/s 'd per Dr. Vance due to family emergency- ER    ESOPHAGOGASTRODUODENOSCOPY N/A 6/23/2023    Procedure: EGD (ESOPHAGOGASTRODUODENOSCOPY);  Surgeon: Juaquin Barry MD;  Location: [facility] ENDO (2ND FLR);  Service: Endoscopy;  Laterality: N/A;  Refferal: PRINCE VANCE  Order  tele enc 5/18/23  dx: history of a Nissen fundoplication  Additional Scheduling Information:  On home oxygen 2nd floor for airway protection also with a pain pump elevated BMI close to 40   Prep Gastroparesis   ins    HYSTERECTOMY  1975    endometriosis    INJECTION OF BOTULINUM TOXIN TYPE A  7/13/2021    Procedure: INJECTION, BOTULINUM TOXIN, 200units;  Surgeon: Shireen Mayo MD;  [facility];  Service: Urology;;    INJECTION OF BOTULINUM TOXIN TYPE A  11/16/2021    Procedure: INJECTION, BOTULINUM TOXIN, 200units;  Surgeon: Shireen Mayo MD;  [facility];  Service: Urology;;    INJECTION OF BOTULINUM TOXIN TYPE A  7/19/2022    Procedure: INJECTION, BOTULINUM TOXIN, 300 units ;  Surgeon: Shireen Mayo MD;  [facility];  Service: Urology;;    INSERTION, SUPRAPUBIC CATHETER N/A 12/13/2022    Procedure: INSERTION, SUPRAPUBIC CATHETER;  Surgeon: Shireen Mayo MD;  [facility];  Service: Urology;  Laterality: N/A;  exchange    pain pump placement      SQ Dilaudid Pump managed by Dr. Hillman, Pain Management    REMOVAL OF BONE SPUR OF FOOT Bilateral 9/16/2022    Procedure: EXCISION ARTHRITIC BONE, BILATERAL FOOT;  Surgeon:  Adam Mcguire DPM;  Location: [facility];  Service: Podiatry;  Laterality: Bilateral;    REPLACEMENT OF BACLOFEN PUMP N/A 8/2/2023    Procedure: REPLACEMENT, BACLOFEN PUMP;  Surgeon: Smitha Condon MD;  Location: [facility] OR 2ND FLR;  Service: Neurosurgery;  Laterality: N/A;  Anes: Gen  Blood: Type & Screen  Pos: Left Lat  Intrathecal Pump  Bed: Reg Reversed  Rad: C-arm  Pump: 40 cc, [employer]tronics    REPLACEMENT OF CATHETER N/A 10/31/2019    Procedure: REPLACEMENT, CATHETER-SUPRAPUBIC;  Surgeon: Shireen Mayo MD;  Location: [facility] OR 1ST FLR;  Service: Urology;  Laterality: N/A;    SPINAL CORD STIMULATOR REMOVAL      before [place]    SPINE SURGERY  5-13-13    CERVICAL FUSION    TONSILLECTOMY       Family History   Problem Relation Age of Onset    Cancer Mother 55        breast    Cancer Father         esophagus,had laryngectomy    Esophageal cancer Father     Parkinsonism Maternal Grandmother     Tremor Maternal Grandmother     No Known Problems Brother     No Known Problems Brother     Heart disease Maternal Uncle     Colon cancer Maternal Uncle         Less than 60    No Known Problems Sister     No Known Problems Maternal Aunt     Cirrhosis Paternal Aunt         ETOH    Liver disease Paternal Aunt         ETOH    Liver disease Paternal Uncle         ETOH    Cirrhosis Paternal Uncle         ETOH    No Known Problems Maternal Grandfather     No Known Problems Paternal Grandmother     No Known Problems Paternal Grandfather     Melanoma Neg Hx     Amblyopia Neg Hx     Blindness Neg Hx     Cataracts Neg Hx     Diabetes Neg Hx     Glaucoma Neg Hx     Hypertension Neg Hx     Macular degeneration Neg Hx     Retinal detachment Neg Hx     Strabismus Neg Hx     Stroke Neg Hx     Thyroid disease Neg Hx     Celiac disease Neg Hx     Colon polyps Neg Hx     Cystic fibrosis Neg Hx     Crohn's disease Neg Hx     Inflammatory bowel disease Neg Hx     Liver cancer Neg Hx     Rectal cancer Neg Hx     Stomach cancer Neg Hx      "Ulcerative colitis Neg Hx     Lymphoma Neg Hx      Social History     Tobacco Use    Smoking status: Never    Smokeless tobacco: Never   Substance Use Topics    Alcohol use: Never    Drug use: Yes     Types: Marijuana     Comment: "medical marijuana gummies"     Review of Systems  See HPI.  Remainder of 10 point systems review negative.    Physical Exam     Initial Vitals [05/24/23 1444]   BP Pulse Resp Temp SpO2   (!) 116/55 78 20 98.2 °F (36.8 °C) 98 %      MAP       --         Physical Exam    Nursing note and vitals reviewed.  Constitutional: She is not diaphoretic. No distress.   Cardiovascular:  Normal rate, regular rhythm and normal heart sounds.     Exam reveals no gallop and no friction rub.       No murmur heard.  Pulmonary/Chest: No stridor. No respiratory distress. She has no wheezes. She has no rhonchi. She has no rales.   Musculoskeletal:         General: Tenderness and edema present.     Neurological: She is alert and oriented to person, place, and time. GCS score is 15. GCS eye subscore is 4. GCS verbal subscore is 5. GCS motor subscore is 6.   Skin: Skin is warm and dry. No pallor.         ED Course   Procedures  Labs Reviewed   CBC W/ AUTO DIFFERENTIAL - Abnormal; Notable for the following components:       Result Value    RBC 3.30 (*)     Hemoglobin 8.5 (*)     Hematocrit 27.6 (*)     MCH 25.8 (*)     MCHC 30.8 (*)     Lymph # 0.9 (*)     Lymph % 17.2 (*)     All other components within normal limits   COMPREHENSIVE METABOLIC PANEL - Abnormal; Notable for the following components:    CO2 33 (*)     Albumin 3.0 (*)     ALT 7 (*)     All other components within normal limits   TROPONIN I   B-TYPE NATRIURETIC PEPTIDE   MAGNESIUM            Imaging Results              X-Ray Chest AP Portable (Final result)  Result time 05/24/23 17:10:02      Final result by Miranda Hernadez MD (05/24/23 17:10:02)                   Impression:      Congestive changes versus nonspecific " pneumonitis.      Electronically signed by: Miranda Bealtri  Date:    05/24/2023  Time:    17:10               Narrative:    EXAMINATION:  XR CHEST AP PORTABLE    CLINICAL HISTORY:  Chest Pain;    TECHNIQUE:  Single frontal view of the chest was performed.    COMPARISON:  04/30/2023    FINDINGS:  The cardiac silhouette is stable and nonenlarged.  Bilateral perihilar interstitial opacities and bands of atelectasis in the lower lobes bilaterally greater on the left.  No pleural effusion or pneumothorax.                                       Medications   HYDROcodone-acetaminophen  mg per tablet 1 tablet (1 tablet Oral Given 5/24/23 1643)   morphine injection 4 mg (4 mg Intravenous Given 5/24/23 1808)   potassium chloride SA CR tablet 40 mEq (40 mEq Oral Given 5/26/23 4743)   metOLazone tablet 5 mg (5 mg Oral Given 5/27/23 0910)     Medical Decision Making:   History:   Old Records Summarized: records from clinic visits and records from previous admission(s).       <> Summary of Records: Seen by her PCP, Dr. Hodges, in clinic 2 days ago. Biggest complaint was back and BLE pain due to edema. He noted that she is already on a large amount of pain medications and deferred further treatment adjustments to pain management. He also noted that her lymphedema was much worse that he had ever seen.     She was admitted to [facility] from 4/28-5/5 for acute respiratory failure with hypoxia and hypercarbia and multifocal pneumonia.   Differential Diagnosis:   Arrhythmia  Acute coronary syndromes   Heart failure exacerbation  Pericarditis  Pericardial effusion   Pleural effusion  Pneumonia  Bronchitis   Independently Interpreted Test(s):   I have ordered and independently interpreted X-rays - see prior notes.  I have ordered and independently interpreted EKG Reading(s) - see prior notes  Clinical Tests:   Lab Tests: Reviewed  Radiological Study: Reviewed  Medical Tests: Reviewed  ED Management:    Emergent workup for the  above to included close monitoring, serial exam, ekg, cxr, labs. Treating with oral and likely IV pain medications, her home dose of oxygen which might require titration, and likely IV lasix. Probable admission since she is unable to walk due to her pain which is a significant change from baseline per her.    MDM: this was an emergent evaluation. Differentil included the above and other conditions. CP/sob workup unremarkable. Pt's primary concern was worsening ble pain due to lymphedema. She had refractory severe pain after receiving po and iv narcotics. She was admitted to [facility] for pain management and possible specialist consultation (wound care, pt/ot, general surgery?) for further lymphedema treatment options.             ED Course as of 08/05/23 0334   Wed May 24, 2023   1728 X-Ray Chest AP Portable  Independent interpretation.  Stable cardiomegaly. Improving bilateral airspace opacities.  [LP]   1729 EKG 12-lead  Independent interpretation.  NSR. V-rate 78 bpm. Normal axis. Intermediate QRS (104 ms). Prolonged QTc (471 ms). No ST elevation or depression. Anterolateral T wave flattening.  [LP]   1756 Pain still severe. No improvement with Norco 10. [LP]   1928 Pain is still severe. Pt also requesting something to eat.   [LP]   1942 Discussed pt's presentation, exam, and workup with the [facility] via telephone. Pt will be admitted to their service. [LP]      ED Course User Index  [LP] Steve Vann III, MD                 Clinical Impression:   Final diagnoses:  [R06.02] Shortness of breath  [M79.604, M79.605] Leg pain, bilateral  [I89.0] Lymphedema  [R07.9] Chest pain, unspecified type  [R52] Intractable pain (Primary)        ED Disposition Condition    Observation Stable                       Steve Vann III, MD  05/24/23 1946       Steve Vann III, MD  08/05/23 0334

## 2023-05-25 PROBLEM — R52 INTRACTABLE PAIN: Status: ACTIVE | Noted: 2023-05-25

## 2023-05-25 LAB
ANION GAP SERPL CALC-SCNC: 8 MMOL/L (ref 8–16)
BASOPHILS # BLD AUTO: 0.01 K/UL (ref 0–0.2)
BASOPHILS NFR BLD: 0.2 % (ref 0–1.9)
BUN SERPL-MCNC: 11 MG/DL (ref 8–23)
CALCIUM SERPL-MCNC: 9.7 MG/DL (ref 8.7–10.5)
CHLORIDE SERPL-SCNC: 99 MMOL/L (ref 95–110)
CO2 SERPL-SCNC: 35 MMOL/L (ref 23–29)
CREAT SERPL-MCNC: 0.8 MG/DL (ref 0.5–1.4)
CRP SERPL-MCNC: 16.2 MG/L (ref 0–8.2)
DIFFERENTIAL METHOD: ABNORMAL
EOSINOPHIL # BLD AUTO: 0.3 K/UL (ref 0–0.5)
EOSINOPHIL NFR BLD: 5 % (ref 0–8)
ERYTHROCYTE [DISTWIDTH] IN BLOOD BY AUTOMATED COUNT: 13.7 % (ref 11.5–14.5)
EST. GFR  (NO RACE VARIABLE): >60 ML/MIN/1.73 M^2
GLUCOSE SERPL-MCNC: 89 MG/DL (ref 70–110)
HCT VFR BLD AUTO: 29.3 % (ref 37–48.5)
HGB BLD-MCNC: 8.8 G/DL (ref 12–16)
IMM GRANULOCYTES # BLD AUTO: 0.01 K/UL (ref 0–0.04)
IMM GRANULOCYTES NFR BLD AUTO: 0.2 % (ref 0–0.5)
LYMPHOCYTES # BLD AUTO: 1.1 K/UL (ref 1–4.8)
LYMPHOCYTES NFR BLD: 20.8 % (ref 18–48)
MAGNESIUM SERPL-MCNC: 1.9 MG/DL (ref 1.6–2.6)
MCH RBC QN AUTO: 25.4 PG (ref 27–31)
MCHC RBC AUTO-ENTMCNC: 30 G/DL (ref 32–36)
MCV RBC AUTO: 85 FL (ref 82–98)
MONOCYTES # BLD AUTO: 0.9 K/UL (ref 0.3–1)
MONOCYTES NFR BLD: 17.3 % (ref 4–15)
NEUTROPHILS # BLD AUTO: 2.9 K/UL (ref 1.8–7.7)
NEUTROPHILS NFR BLD: 56.5 % (ref 38–73)
NRBC BLD-RTO: 0 /100 WBC
PLATELET # BLD AUTO: 208 K/UL (ref 150–450)
PMV BLD AUTO: 9.7 FL (ref 9.2–12.9)
POTASSIUM SERPL-SCNC: 3.3 MMOL/L (ref 3.5–5.1)
RBC # BLD AUTO: 3.46 M/UL (ref 4–5.4)
SODIUM SERPL-SCNC: 142 MMOL/L (ref 136–145)
WBC # BLD AUTO: 5.04 K/UL (ref 3.9–12.7)

## 2023-05-25 PROCEDURE — 25000003 PHARM REV CODE 250: Performed by: NURSE PRACTITIONER

## 2023-05-25 PROCEDURE — 94640 AIRWAY INHALATION TREATMENT: CPT

## 2023-05-25 PROCEDURE — G0378 HOSPITAL OBSERVATION PER HR: HCPCS

## 2023-05-25 PROCEDURE — 27000221 HC OXYGEN, UP TO 24 HOURS

## 2023-05-25 PROCEDURE — 86140 C-REACTIVE PROTEIN: CPT | Performed by: NURSE PRACTITIONER

## 2023-05-25 PROCEDURE — 94660 CPAP INITIATION&MGMT: CPT

## 2023-05-25 PROCEDURE — 36415 COLL VENOUS BLD VENIPUNCTURE: CPT | Performed by: NURSE PRACTITIONER

## 2023-05-25 PROCEDURE — 27000190 HC CPAP FULL FACE MASK W/VALVE

## 2023-05-25 PROCEDURE — 25000242 PHARM REV CODE 250 ALT 637 W/ HCPCS: Performed by: NURSE PRACTITIONER

## 2023-05-25 PROCEDURE — 99900035 HC TECH TIME PER 15 MIN (STAT)

## 2023-05-25 PROCEDURE — 63600175 PHARM REV CODE 636 W HCPCS: Performed by: NURSE PRACTITIONER

## 2023-05-25 PROCEDURE — 96372 THER/PROPH/DIAG INJ SC/IM: CPT | Performed by: NURSE PRACTITIONER

## 2023-05-25 PROCEDURE — 94761 N-INVAS EAR/PLS OXIMETRY MLT: CPT

## 2023-05-25 PROCEDURE — 83735 ASSAY OF MAGNESIUM: CPT | Performed by: NURSE PRACTITIONER

## 2023-05-25 PROCEDURE — 85025 COMPLETE CBC W/AUTO DIFF WBC: CPT | Performed by: NURSE PRACTITIONER

## 2023-05-25 PROCEDURE — 80048 BASIC METABOLIC PNL TOTAL CA: CPT | Performed by: NURSE PRACTITIONER

## 2023-05-25 PROCEDURE — 96376 TX/PRO/DX INJ SAME DRUG ADON: CPT

## 2023-05-25 RX ORDER — LANOLIN ALCOHOL/MO/W.PET/CERES
1 CREAM (GRAM) TOPICAL DAILY
Status: DISCONTINUED | OUTPATIENT
Start: 2023-05-25 | End: 2023-05-30 | Stop reason: HOSPADM

## 2023-05-25 RX ORDER — POTASSIUM CHLORIDE 20 MEQ/1
40 TABLET, EXTENDED RELEASE ORAL 2 TIMES DAILY
Status: COMPLETED | OUTPATIENT
Start: 2023-05-25 | End: 2023-05-26

## 2023-05-25 RX ADMIN — LIDOCAINE 1 PATCH: 50 PATCH CUTANEOUS at 09:05

## 2023-05-25 RX ADMIN — LEVOTHYROXINE SODIUM 150 MCG: 75 TABLET ORAL at 06:05

## 2023-05-25 RX ADMIN — HYDROCODONE BITARTRATE AND ACETAMINOPHEN 1 TABLET: 10; 325 TABLET ORAL at 09:05

## 2023-05-25 RX ADMIN — HYDROCORTISONE 10 MG: 5 TABLET ORAL at 06:05

## 2023-05-25 RX ADMIN — HYDROCODONE BITARTRATE AND ACETAMINOPHEN 1 TABLET: 10; 325 TABLET ORAL at 01:05

## 2023-05-25 RX ADMIN — FUROSEMIDE 60 MG: 10 INJECTION, SOLUTION INTRAMUSCULAR; INTRAVENOUS at 09:05

## 2023-05-25 RX ADMIN — TIOTROPIUM BROMIDE INHALATION SPRAY 2 PUFF: 3.12 SPRAY, METERED RESPIRATORY (INHALATION) at 08:05

## 2023-05-25 RX ADMIN — POTASSIUM CHLORIDE 40 MEQ: 1500 TABLET, EXTENDED RELEASE ORAL at 01:05

## 2023-05-25 RX ADMIN — OXYBUTYNIN CHLORIDE 10 MG: 5 TABLET, EXTENDED RELEASE ORAL at 09:05

## 2023-05-25 RX ADMIN — TRAZODONE HYDROCHLORIDE 300 MG: 100 TABLET ORAL at 09:05

## 2023-05-25 RX ADMIN — ENOXAPARIN SODIUM 40 MG: 40 INJECTION SUBCUTANEOUS at 04:05

## 2023-05-25 RX ADMIN — SENNOSIDES 2 TABLET: 8.6 TABLET, FILM COATED ORAL at 09:05

## 2023-05-25 RX ADMIN — QUETIAPINE FUMARATE 200 MG: 100 TABLET ORAL at 09:05

## 2023-05-25 RX ADMIN — FERROUS SULFATE TAB 325 MG (65 MG ELEMENTAL FE) 1 EACH: 325 (65 FE) TAB at 01:05

## 2023-05-25 RX ADMIN — DOCUSATE SODIUM 200 MG: 100 CAPSULE, LIQUID FILLED ORAL at 09:05

## 2023-05-25 RX ADMIN — FLUOXETINE HYDROCHLORIDE 60 MG: 20 CAPSULE ORAL at 09:05

## 2023-05-25 RX ADMIN — ATORVASTATIN CALCIUM 80 MG: 40 TABLET, FILM COATED ORAL at 09:05

## 2023-05-25 RX ADMIN — HYDROCORTISONE 5 MG: 5 TABLET ORAL at 09:05

## 2023-05-25 RX ADMIN — TRAZODONE HYDROCHLORIDE 300 MG: 100 TABLET ORAL at 12:05

## 2023-05-25 RX ADMIN — LIOTHYRONINE SODIUM 25 MCG: 25 TABLET ORAL at 09:05

## 2023-05-25 RX ADMIN — POTASSIUM CHLORIDE 40 MEQ: 1500 TABLET, EXTENDED RELEASE ORAL at 09:05

## 2023-05-25 RX ADMIN — HYDROCORTISONE 5 MG: 5 TABLET ORAL at 12:05

## 2023-05-25 RX ADMIN — ASPIRIN 81 MG: 81 TABLET, COATED ORAL at 09:05

## 2023-05-25 NOTE — PLAN OF CARE
05/24/23 2309   Admission   Communication Issues? Patient Hearing   Shift   Virtual Nurse - Rounding Complete   Pain Management Interventions quiet environment facilitated;relaxation techniques promoted   Virtual Nurse - Patient Verbalized Approval Of VN Rounding;Camera Use   Type of Frequent Check   Type Patient Rounds;Telemetry Monitoring   Safety/Activity   Patient Rounds bed in low position;placement of personal items at bedside;bed wheels locked;call light in patient/parent reach;visualized patient;clutter free environment maintained;ID band on   Safety Promotion/Fall Prevention bed alarm set;assistive device/personal item within reach;nonskid shoes/socks when out of bed;Fall Risk reviewed with patient/family;medications reviewed;instructed to call staff for mobility;side rails raised x 2;/camera at bedside   Safety Precautions emergency equipment at bedside   Activity Management Rolling - L1   Positioning   Body Position position changed independently   Head of Bed (HOB) Positioning HOB elevated   Positioning/Transfer Devices pillows;in use    VN cued into room to complete admit assessment. VIP model introduced; VN working alongside bedside treatment team.  Plan of care reviewed with patient and  . Patient informed of fall risk, fall precautions, call light within reach, side rails x2 elevated. Patient notified to ask staff for assistance. Patient verbalized complete understanding. Time allowed for questions. Will continue to monitor and intervene as needed.

## 2023-05-25 NOTE — SUBJECTIVE & OBJECTIVE
Past Medical History:   Diagnosis Date    Anticoagulant long-term use     Anxiety     Arthritis     Bilateral lower extremity edema     severe chronic    Carotid artery occlusion     Cataract     CHF (congestive heart failure)     Coronary artery disease     subtotalled LAD with collateral    Depression     Fever blister     Hard of hearing     Hypokalemia 1/9/2023    Hyponatremia 2/4/2022    Hypothyroid     Iron deficiency anemia     Lumbar radiculopathy     with chronic pain    Ocular migraine     Other emphysema 5/22/2023    Renal disorder     Sleep apnea     cpap       Past Surgical History:   Procedure Laterality Date    ADENOIDECTOMY      BACK SURGERY      x 12    CARDIAC CATHETERIZATION  2016    subtotalled LAD with right to left collaterals    CATARACT EXTRACTION W/  INTRAOCULAR LENS IMPLANT Left     Dr Coleman     CYSTOSCOPIC LITHOLAPAXY N/A 6/27/2019    Procedure: CYSTOLITHOLAPAXY;  Surgeon: Shireen Mayo MD;  Location: [facility] OR 2ND FLR;  Service: Urology;  Laterality: N/A;    CYSTOSCOPIC LITHOLAPAXY N/A 9/3/2019    Procedure: CYSTOLITHOLAPAXY;  Surgeon: Shireen Mayo MD;  Location: [facility] OR 2ND FLR;  Service: Urology;  Laterality: N/A;    CYSTOSCOPY N/A 7/13/2021    Procedure: CYSTOSCOPY;  Surgeon: Shireen Mayo MD;  Location: [facility] OR 1ST FLR;  Service: Urology;  Laterality: N/A;    CYSTOSCOPY  11/16/2021    Procedure: CYSTOSCOPY;  Surgeon: Shireen Mayo MD;  [facility];  Service: Urology;;    CYSTOSCOPY  7/19/2022    Procedure: CYSTOSCOPY;  Surgeon: Shireen Mayo MD;  [facility];  Service: Urology;;    CYSTOSCOPY WITH INJECTION OF PERIURETHRAL BULKING AGENT  7/19/2022    Procedure: CYSTOSCOPY, WITH PERIURETHRAL BULKING AGENT INJECTION-MACROPLASTIQUE;  Surgeon: Shireen Mayo MD;  [facility];  Service: Urology;;    CYSTOSCOPY WITH INJECTION OF PERIURETHRAL BULKING AGENT N/A 3/28/2023    Procedure: CYSTOSCOPY, WITH PERIURETHRAL BULKING AGENT  INJECTION;  Surgeon: Shireen Mayo MD;  [facility];  Service: Urology;  Laterality: N/A;  [place]    CYSTOSCOPY,WITH BOTULINUM TOXIN INJECTION N/A 12/13/2022    Procedure: CYSTOSCOPY,WITH BOTULINUM TOXIN INJECTION;  Surgeon: Shireen Mayo MD;  [facility];  Service: Urology;  Laterality: N/A;  300 U    CYSTOSCOPY,WITH BOTULINUM TOXIN INJECTION N/A 3/28/2023    Procedure: CYSTOSCOPY,WITH BOTULINUM TOXIN INJECTION;  Surgeon: Shireen Mayo MD;  [facility];  Service: Urology;  Laterality: N/A;  45 MIN.    300 UNITS    ESOPHAGOGASTRODUODENOSCOPY N/A 5/23/2018    Procedure: ESOPHAGOGASTRODUODENOSCOPY (EGD);  Surgeon: Prince Vance MD;  Location: [facility] (4TH FLR);  Service: Endoscopy;  Laterality: N/A;  r/s 'd per Dr. Vance due to family emergency- ER    HYSTERECTOMY  1975    endometriosis    INJECTION OF BOTULINUM TOXIN TYPE A  7/13/2021    Procedure: INJECTION, BOTULINUM TOXIN, 200units;  Surgeon: Shireen Mayo MD;  [facility];  Service: Urology;;    INJECTION OF BOTULINUM TOXIN TYPE A  11/16/2021    Procedure: INJECTION, BOTULINUM TOXIN, 200units;  Surgeon: Shireen Mayo MD;  [facility];  Service: Urology;;    INJECTION OF BOTULINUM TOXIN TYPE A  7/19/2022    Procedure: INJECTION, BOTULINUM TOXIN, 300 units ;  Surgeon: Shireen Mayo MD;  [facility];  Service: Urology;;    INSERTION, SUPRAPUBIC CATHETER N/A 12/13/2022    Procedure: INSERTION, SUPRAPUBIC CATHETER;  Surgeon: Shireen Mayo MD;  [facility];  Service: Urology;  Laterality: N/A;  exchange    pain pump placement      SQ Dilaudid Pump managed by Dr. Hillman, Pain Management    REMOVAL OF BONE SPUR OF FOOT Bilateral 9/16/2022    Procedure: EXCISION ARTHRITIC BONE, BILATERAL FOOT;  Surgeon: Adam Mcguire DPM;  Location: [facility] OR;  Service: Podiatry;  Laterality: Bilateral;    REPLACEMENT OF CATHETER N/A 10/31/2019     Procedure: REPLACEMENT, CATHETER-SUPRAPUBIC;  Surgeon: Shireen Mayo MD;  Location: [facility] OR [address] [place] [address];  Service: Urology;  Laterality: N/A;    SPINAL CORD STIMULATOR REMOVAL      before [place]    SPINE SURGERY  5-13-13    CERVICAL FUSION    TONSILLECTOMY         Review of patient's allergies indicates:   Allergen Reactions    (d)-limonene flavor      Other reaction(s): difficult intubation  Other reaction(s): Difficulty breathing    Bactrim [sulfamethoxazole-trimethoprim] Anaphylaxis    Benadryl [diphenhydramine hcl] Shortness Of Breath    Fentanyl Itching, Nausea And Vomiting and Swelling             Imitrex [sumatriptan succinate] Shortness Of Breath    Percocet [oxycodone-acetaminophen] Shortness Of Breath    Topamax [topiramate] Shortness Of Breath    Vancomycin Anaphylaxis     Rash    Butorphanol tartrate     Darvocet a500 [propoxyphene n-acetaminophen]      Other reaction(s): Difficulty breathing    Evening primrose (oenothera biennis)     Lyrica [pregabalin] Other (See Comments)     tremors    White petrolatum-zinc     Zinc oxide-white petrolatum      Other reaction(s): Difficulty breathing    Latex, natural rubber Itching and Rash    Phenytoin Rash and Other (See Comments)     Trouble breathing       No current facility-administered medications on file prior to encounter.     Current Outpatient Medications on File Prior to Encounter   Medication Sig    albuterol-ipratropium (DUO-NEB) 2.5 mg-0.5 mg/3 mL nebulizer solution Take 3 mLs by nebulization every 6 (six) hours as needed for Wheezing or Shortness of Breath. Rescue    aspirin (ECOTRIN) 81 MG EC tablet Take 1 tablet (81 mg total) by mouth once daily.    butalbital-acetaminophen-caffeine -40 mg (FIORICET, ESGIC) -40 mg per tablet Take 1 tablet by mouth daily as needed.    cyanocobalamin, vitamin B-12, 5,000 mcg Subl Place 1 tablet under the tongue once daily.    docusate sodium (COLACE) 100 MG capsule Take 200 mg by mouth every evening.     FLUoxetine 20 MG capsule Take 3 capsules (60 mg total) by mouth once daily.    fluticasone propionate (FLONASE) 50 mcg/actuation nasal spray 2 sprays (100 mcg total) by Each Nostril route once daily.    furosemide (LASIX) 40 MG tablet Take 1.5 tablets (60 mg total) by mouth once daily.    HYDROcodone-acetaminophen (NORCO)  mg per tablet Take 1 tablet by mouth every 6 (six) hours as needed for breakthrough pain.    hydrocortisone (CORTEF) 10 MG Tab Take 1 tab in the morning and 0.5 tab in the afternoon (Patient taking differently: Take 10 mg by mouth every morning.)    hydrocortisone (CORTEF) 10 MG Tab Take 5 mg by mouth every evening. 1/2 tablet    hydrOXYzine (ATARAX) 50 MG tablet Take 1 tablet (50 mg total) by mouth every 4 (four) hours as needed for Anxiety.    intrathecal pain pump compound Hydromorphone (7.5 mg/mL) infusion at 8.59 mg/day (0.3578 mg/hr) out of a total reservoir volume of 40 mL  Pump filled monthly    levothyroxine (SYNTHROID) 150 MCG tablet Take 1 tablet (150 mcg total) by mouth before breakfast.    LIDOcaine (LIDODERM) 5 % Place 1 patch onto the skin once daily. Remove & Discard patch within 12 hours or as directed by MD    liothyronine (CYTOMEL) 25 MCG Tab Take 1 tablet (25 mcg total) by mouth once daily.    mirabegron (MYRBETRIQ) 50 mg Tb24 Take 1 tablet (50 mg total) by mouth once daily.    ondansetron (ZOFRAN-ODT) 4 MG TbDL Take 4 mg by mouth every 4 to 6 hours as needed.    promethazine (PHENERGAN) 25 MG tablet Take 25 mg by mouth every 6 (six) hours as needed for Nausea.    QUEtiapine (SEROQUEL) 100 MG Tab TAKE 2 TABLETS (200 MG) BY MOUTH NIGHTLY (Patient taking differently: Take 200 mg by mouth nightly.)    senna (SENNA LAX) 8.6 mg tablet Take 2 tablets by mouth 2 (two) times daily.    tiotropium bromide (SPIRIVA RESPIMAT) 2.5 mcg/actuation inhaler Inhale 2 puffs into the lungs once daily. Controller    traZODone (DESYREL) 300 MG tablet Take 1 tablet (300 mg total) by mouth  "every evening.    atorvastatin (LIPITOR) 80 MG tablet TAKE ONE TABLET BY MOUTH EVERY DAY (Patient taking differently: Take 80 mg by mouth once daily.)    fludrocortisone (FLORINEF) 0.1 mg Tab Take 1 tablet (100 mcg total) by mouth once daily. (Patient not taking: Reported on 5/24/2023)    ketorolac (TORADOL) 10 mg tablet Take 10 mg by mouth daily as needed.    nitroGLYCERIN (NITROSTAT) 0.4 MG SL tablet Place 0.4 mg under the tongue every 5 (five) minutes as needed for Chest pain.    pantoprazole (PROTONIX) 40 MG tablet Take 1 tablet (40 mg total) by mouth once daily. (Patient not taking: Reported on 5/24/2023)    potassium chloride SA (K-DUR,KLOR-CON) 20 MEQ tablet Take 1 tablet (20 mEq total) by mouth once daily. (Patient not taking: Reported on 5/24/2023)    tiZANidine (ZANAFLEX) 4 MG tablet Take 4 mg by mouth 2 (two) times daily as needed.     Family History       Problem Relation (Age of Onset)    Cancer Mother (55), Father    Cirrhosis Paternal Aunt, Paternal Uncle    Colon cancer Maternal Uncle    Esophageal cancer Father    Heart disease Maternal Uncle    Liver disease Paternal Aunt, Paternal Uncle    No Known Problems Brother, Brother, Sister, Maternal Aunt, Maternal Grandfather, Paternal Grandmother, Paternal Grandfather    Parkinsonism Maternal Grandmother    Tremor Maternal Grandmother          Tobacco Use    Smoking status: Never    Smokeless tobacco: Never   Substance and Sexual Activity    Alcohol use: Never    Drug use: Yes     Types: Marijuana     Comment: "medical marijuana gummies"    Sexual activity: Never     Partners: Male     Review of Systems   Constitutional:  Negative for chills and fever.   HENT:  Negative for trouble swallowing.    Respiratory:  Positive for shortness of breath (chronic).    Cardiovascular:  Positive for leg swelling. Negative for chest pain.   Gastrointestinal:  Negative for abdominal pain, nausea and vomiting.   Genitourinary:  Negative for decreased urine volume.        " Suprapubic catheter   Musculoskeletal:  Positive for arthralgias, back pain, gait problem and myalgias.   Skin:         Redness and weeping BLE   Neurological:  Negative for dizziness, light-headedness and headaches.   Psychiatric/Behavioral:  Negative for agitation and confusion. The patient is not nervous/anxious.    Objective:     Vital Signs (Most Recent):  Temp: 98.2 °F (36.8 °C) (23)  Pulse: 78 (23 0035)  Resp: 18 (23)  BP: (!) 113/54 (23)  SpO2: 98 % (23) Vital Signs (24h Range):  Temp:  [97.6 °F (36.4 °C)-98.2 °F (36.8 °C)] 98.2 °F (36.8 °C)  Pulse:  [77-89] 78  Resp:  [14-20] 18  SpO2:  [97 %-99 %] 98 %  BP: (102-116)/(54-68) 113/54     Weight: 106.6 kg (235 lb 0.2 oz)  Body mass index is 39.11 kg/m².     Physical Exam  Vitals and nursing note reviewed.   Constitutional:       General: She is not in acute distress.     Appearance: She is obese. She is ill-appearing.   HENT:      Head: Normocephalic and atraumatic.   Cardiovascular:      Rate and Rhythm: Normal rate and regular rhythm.      Pulses:           Dorsalis pedis pulses are 1+ on the right side and 1+ on the left side.   Musculoskeletal:      Right lower le+ Pitting Edema present.      Left lower le+ Pitting Edema present.   Skin:     General: Skin is warm.      Findings: Erythema present.      Comments: Erythema, weeping, warmth, tenderness BLE   Neurological:      Mental Status: She is oriented to person, place, and time and easily aroused. She is lethargic.   Psychiatric:         Mood and Affect: Mood normal.         Behavior: Behavior normal.         Thought Content: Thought content normal.         Judgment: Judgment normal.              Significant Labs: All pertinent labs within the past 24 hours have been reviewed.    Significant Imaging: I have reviewed all pertinent imaging results/findings within the past 24 hours.

## 2023-05-25 NOTE — PT/OT/SLP PROGRESS
Physical Therapy      Patient Name:  Tasha Hawley   MRN:  [id number]    Patient not seen today secondary to Nurse/ GERALD hold, Therapist assessment (Patient unable to keep wakeful state 2/2 meds given last night and prior to arrival to hospital; will defer session until tomorrow, nurse in agreement.).   5/25/2023

## 2023-05-25 NOTE — ASSESSMENT & PLAN NOTE
· Chronic but acutely worse with associated increase in pain  · IV Lasix 60 mg b.i.d.  · BLE ultrasound to assess perfusion pending  · MALLORIE hose ordered  · If no improvement from Lasix, may benefit from general surgery consult for recommendations  · Continue pain management with intrathecal pump and home pain medication regimen  · Hold p.r.n. pain medications or muscle relaxers for SBP <100 or increased lethargy

## 2023-05-25 NOTE — PLAN OF CARE
Pt AAO x2.  VSS.  Pt remained afebrile throughout this shift.   IVlasix administered per order.   Pt remained free of falls this shift.   Pt c/o of pain this shift.  Plan of care reviewed. Patient verbalizes understanding.   Pt moving/turing with assistance. Frequent weight shifting encouraged.  Pt on tele, NSR  Bed low, side rails up x 2, wheels locked, call light in reach.   Bed alarm maintained for safety.   Patient instructed to call for assistance.   Hourly rounding completed.   24 hour chart check completed.  Will continue to monitor.

## 2023-05-25 NOTE — ASSESSMENT & PLAN NOTE
· Continue pain management with intrathecal pump and home pain medication regimen  · Hold p.r.n. pain medications or muscle relaxers for SBP <100 or increased lethargy

## 2023-05-25 NOTE — HPI
Tasha Hawley is a 67 year old female w/ a significant PMH including chronic pain with long-term opioid use, recurrent UTIs, lymphedema, debility, chronic diastolic HF, neurogenic bladder w/ suprapubic catheter, depression, and anxiety. She was recently discharged after admission for pneumonia, for which she completed a course of PO abx. She presented to the ED with c/o chronic BLE pain that is worsening as well as increased BLE edema. She reports compliance with lasix. She has chronic lymphedema of BLE, however, her  reports it is much worse over the last 2 days and now with weeping. She also reported CP in the ED, however, denies that on my exam. She has chronic SOB and is on home O2 4L, she denies change in SOB. Patient lethargic but easily arousable on exam. Her , Dangelo, answered majority of history questions.

## 2023-05-25 NOTE — PT/OT/SLP PROGRESS
Occupational Therapy      Patient Name:  Tasha Hawley   MRN:  [id number]    Patient not seen today secondary to Other (Comment) (Pt drowsy, only awakens briefly with sternal rub before retuning to sleeping). Pt not appropriate for therapy at this time. Nsg aware. Will follow-up next tx date.    5/25/2023

## 2023-05-25 NOTE — ASSESSMENT & PLAN NOTE
· Chronic   · Reported on arrival, but patient denies on further assessment   · Trop negative   · EKG:  Sinus rhythm with PVCs, HR 78, no ST or T-wave elevation noted  · Maintain telemetry

## 2023-05-25 NOTE — NURSING
Received report on pt from REJI Connelly. Pt arrived to unit lethargic but easily aroused. Oriented to self and situation only.  at bedside. Pt reports pain to BLE and left/center back with shooting pains down legs. Pt has MALLORIE hose ordered, but none on unit are large enough. House Supervisor, Josh, notified of need for larger ones. Safety protocols and call light functions reviewed with pt and . Bed in low/locked position with 3 rails raised and call light within reach.

## 2023-05-25 NOTE — H&P
[facility] Medicine  History & Physical    Patient Name: Tasha Hawley  MRN: [id number]  Patient Class: OP- Observation  Admission Date: 5/24/2023  Attending Physician: Bharti Sullivan MD   Primary Care Provider: Mesfin Hodges Ii, MD         Patient information was obtained from patient, spouse/SO, past medical records and ER records.     Subjective:     Principal Problem:Lymphedema    Chief Complaint:   Chief Complaint   Patient presents with    Pain     Arrived by [place] with complaints of increased pain to bilateral hips and legs. Patient has swelling to both legs with weeping. Patient on lasix at home. Took her medical marijuana gummy prior to EMS leaving home. Recently diagnosed with pneumonia and finished coarse of antibiotics. Denies change in SOB. On her home oxygen. 4 L NC which is her home oxygen.        HPI: Tasha Hawley is a 67 year old female w/ a significant PMH including chronic pain with long-term opioid use, recurrent UTIs, lymphedema, debility, chronic diastolic HF, neurogenic bladder w/ suprapubic catheter, depression, and anxiety. She was recently discharged after admission for pneumonia, for which she completed a course of PO abx. She presented to the ED with c/o chronic BLE pain that is worsening as well as increased BLE edema. She reports compliance with lasix. She has chronic lymphedema of BLE, however, her  reports it is much worse over the last 2 days and now with weeping. She also reported CP in the ED, however, denies that on my exam. She has chronic SOB and is on home O2 4L, she denies change in SOB. Patient lethargic but easily arousable on exam. Her , Dangelo, answered majority of history questions.           Past Medical History:   Diagnosis Date    Anticoagulant long-term use     Anxiety     Arthritis     Bilateral lower extremity edema     severe chronic    Carotid artery occlusion     Cataract     CHF (congestive heart failure)      Coronary artery disease     subtotalled LAD with collateral    Depression     Fever blister     Hard of hearing     Hypokalemia 1/9/2023    Hyponatremia 2/4/2022    Hypothyroid     Iron deficiency anemia     Lumbar radiculopathy     with chronic pain    Ocular migraine     Other emphysema 5/22/2023    Renal disorder     Sleep apnea     cpap       Past Surgical History:   Procedure Laterality Date    ADENOIDECTOMY      BACK SURGERY      x 12    CARDIAC CATHETERIZATION  2016    subtotalled LAD with right to left collaterals    CATARACT EXTRACTION W/  INTRAOCULAR LENS IMPLANT Left     Dr Coleman     CYSTOSCOPIC LITHOLAPAXY N/A 6/27/2019    Procedure: CYSTOLITHOLAPAXY;  Surgeon: Shireen Mayo MD;  Location: [facility] OR 2ND FLR;  Service: Urology;  Laterality: N/A;    CYSTOSCOPIC LITHOLAPAXY N/A 9/3/2019    Procedure: CYSTOLITHOLAPAXY;  Surgeon: Shireen Mayo MD;  Location: [facility] OR 2ND FLR;  Service: Urology;  Laterality: N/A;    CYSTOSCOPY N/A 7/13/2021    Procedure: CYSTOSCOPY;  Surgeon: Shireen Mayo MD;  Location: [facility] OR 1ST FLR;  Service: Urology;  Laterality: N/A;    CYSTOSCOPY  11/16/2021    Procedure: CYSTOSCOPY;  Surgeon: Shireen Mayo MD;  Location: [facility] OR 1ST FLR;  Service: Urology;;    CYSTOSCOPY  7/19/2022    Procedure: CYSTOSCOPY;  Surgeon: Shireen Mayo MD;  Location: [facility] OR 1ST FLR;  Service: Urology;;    CYSTOSCOPY WITH INJECTION OF PERIURETHRAL BULKING AGENT  7/19/2022    Procedure: CYSTOSCOPY, WITH PERIURETHRAL BULKING AGENT INJECTION-MACROPLASTIQUE;  Surgeon: Shireen Mayo MD;  Location: [facility] OR 1ST FLR;  Service: Urology;;    CYSTOSCOPY WITH INJECTION OF PERIURETHRAL BULKING AGENT N/A 3/28/2023    Procedure: CYSTOSCOPY, WITH PERIURETHRAL BULKING AGENT INJECTION;  Surgeon: Shireen Mayo MD;  Location: [facility] OR 1ST FLR;  Service: Urology;  Laterality: N/A;  [place]    CYSTOSCOPY,WITH BOTULINUM TOXIN INJECTION N/A 12/13/2022    Procedure:  CYSTOSCOPY,WITH BOTULINUM TOXIN INJECTION;  Surgeon: Shireen Mayo MD;  [facility];  Service: Urology;  Laterality: N/A;  300 U    CYSTOSCOPY,WITH BOTULINUM TOXIN INJECTION N/A 3/28/2023    Procedure: CYSTOSCOPY,WITH BOTULINUM TOXIN INJECTION;  Surgeon: Shireen Mayo MD;  [facility];  Service: Urology;  Laterality: N/A;  45 MIN.    300 UNITS    ESOPHAGOGASTRODUODENOSCOPY N/A 5/23/2018    Procedure: ESOPHAGOGASTRODUODENOSCOPY (EGD);  Surgeon: Prince Vance MD;  Location: [facility] (4TH FLR);  Service: Endoscopy;  Laterality: N/A;  r/s 'd per Dr. Vance due to family emergency- ER    HYSTERECTOMY  1975    endometriosis    INJECTION OF BOTULINUM TOXIN TYPE A  7/13/2021    Procedure: INJECTION, BOTULINUM TOXIN, 200units;  Surgeon: Shireen Mayo MD;  Location: [facility] OR [address] [place] [address];  Service: Urology;;    INJECTION OF BOTULINUM TOXIN TYPE A  11/16/2021    Procedure: INJECTION, BOTULINUM TOXIN, 200units;  Surgeon: Shireen Mayo MD;  Location: [facility] OR [address] [place] [address];  Service: Urology;;    INJECTION OF BOTULINUM TOXIN TYPE A  7/19/2022    Procedure: INJECTION, BOTULINUM TOXIN, 300 units ;  Surgeon: Shireen Mayo MD;  [facility];  Service: Urology;;    INSERTION, SUPRAPUBIC CATHETER N/A 12/13/2022    Procedure: INSERTION, SUPRAPUBIC CATHETER;  Surgeon: Shireen Mayo MD;  Location: [facility] OR [address] [place] [address];  Service: Urology;  Laterality: N/A;  exchange    pain pump placement      SQ Dilaudid Pump managed by Dr. Hillman, Pain Management    REMOVAL OF BONE SPUR OF FOOT Bilateral 9/16/2022    Procedure: EXCISION ARTHRITIC BONE, BILATERAL FOOT;  Surgeon: Adam Mcguire DPM;  Location: [facility];  Service: Podiatry;  Laterality: Bilateral;    REPLACEMENT OF CATHETER N/A 10/31/2019    Procedure: REPLACEMENT, CATHETER-SUPRAPUBIC;  Surgeon: Shireen Mayo MD;  Location: [facility] OR [address] [place] [address];  Service: Urology;  Laterality: N/A;    SPINAL CORD STIMULATOR REMOVAL       before [place]    SPINE SURGERY  5-13-13    CERVICAL FUSION    TONSILLECTOMY         Review of patient's allergies indicates:   Allergen Reactions    (d)-limonene flavor      Other reaction(s): difficult intubation  Other reaction(s): Difficulty breathing    Bactrim [sulfamethoxazole-trimethoprim] Anaphylaxis    Benadryl [diphenhydramine hcl] Shortness Of Breath    Fentanyl Itching, Nausea And Vomiting and Swelling             Imitrex [sumatriptan succinate] Shortness Of Breath    Percocet [oxycodone-acetaminophen] Shortness Of Breath    Topamax [topiramate] Shortness Of Breath    Vancomycin Anaphylaxis     Rash    Butorphanol tartrate     Darvocet a500 [propoxyphene n-acetaminophen]      Other reaction(s): Difficulty breathing    Evening primrose (oenothera biennis)     Lyrica [pregabalin] Other (See Comments)     tremors    White petrolatum-zinc     Zinc oxide-white petrolatum      Other reaction(s): Difficulty breathing    Latex, natural rubber Itching and Rash    Phenytoin Rash and Other (See Comments)     Trouble breathing       No current facility-administered medications on file prior to encounter.     Current Outpatient Medications on File Prior to Encounter   Medication Sig    albuterol-ipratropium (DUO-NEB) 2.5 mg-0.5 mg/3 mL nebulizer solution Take 3 mLs by nebulization every 6 (six) hours as needed for Wheezing or Shortness of Breath. Rescue    aspirin (ECOTRIN) 81 MG EC tablet Take 1 tablet (81 mg total) by mouth once daily.    butalbital-acetaminophen-caffeine -40 mg (FIORICET, ESGIC) -40 mg per tablet Take 1 tablet by mouth daily as needed.    cyanocobalamin, vitamin B-12, 5,000 mcg Subl Place 1 tablet under the tongue once daily.    docusate sodium (COLACE) 100 MG capsule Take 200 mg by mouth every evening.    FLUoxetine 20 MG capsule Take 3 capsules (60 mg total) by mouth once daily.    fluticasone propionate (FLONASE) 50 mcg/actuation nasal spray 2 sprays  (100 mcg total) by Each Nostril route once daily.    furosemide (LASIX) 40 MG tablet Take 1.5 tablets (60 mg total) by mouth once daily.    HYDROcodone-acetaminophen (NORCO)  mg per tablet Take 1 tablet by mouth every 6 (six) hours as needed for breakthrough pain.    hydrocortisone (CORTEF) 10 MG Tab Take 1 tab in the morning and 0.5 tab in the afternoon (Patient taking differently: Take 10 mg by mouth every morning.)    hydrocortisone (CORTEF) 10 MG Tab Take 5 mg by mouth every evening. 1/2 tablet    hydrOXYzine (ATARAX) 50 MG tablet Take 1 tablet (50 mg total) by mouth every 4 (four) hours as needed for Anxiety.    intrathecal pain pump compound Hydromorphone (7.5 mg/mL) infusion at 8.59 mg/day (0.3578 mg/hr) out of a total reservoir volume of 40 mL  Pump filled monthly    levothyroxine (SYNTHROID) 150 MCG tablet Take 1 tablet (150 mcg total) by mouth before breakfast.    LIDOcaine (LIDODERM) 5 % Place 1 patch onto the skin once daily. Remove & Discard patch within 12 hours or as directed by MD    liothyronine (CYTOMEL) 25 MCG Tab Take 1 tablet (25 mcg total) by mouth once daily.    mirabegron (MYRBETRIQ) 50 mg Tb24 Take 1 tablet (50 mg total) by mouth once daily.    ondansetron (ZOFRAN-ODT) 4 MG TbDL Take 4 mg by mouth every 4 to 6 hours as needed.    promethazine (PHENERGAN) 25 MG tablet Take 25 mg by mouth every 6 (six) hours as needed for Nausea.    QUEtiapine (SEROQUEL) 100 MG Tab TAKE 2 TABLETS (200 MG) BY MOUTH NIGHTLY (Patient taking differently: Take 200 mg by mouth nightly.)    senna (SENNA LAX) 8.6 mg tablet Take 2 tablets by mouth 2 (two) times daily.    tiotropium bromide (SPIRIVA RESPIMAT) 2.5 mcg/actuation inhaler Inhale 2 puffs into the lungs once daily. Controller    traZODone (DESYREL) 300 MG tablet Take 1 tablet (300 mg total) by mouth every evening.    atorvastatin (LIPITOR) 80 MG tablet TAKE ONE TABLET BY MOUTH EVERY DAY (Patient taking differently: Take 80 mg by mouth  "once daily.)    fludrocortisone (FLORINEF) 0.1 mg Tab Take 1 tablet (100 mcg total) by mouth once daily. (Patient not taking: Reported on 5/24/2023)    ketorolac (TORADOL) 10 mg tablet Take 10 mg by mouth daily as needed.    nitroGLYCERIN (NITROSTAT) 0.4 MG SL tablet Place 0.4 mg under the tongue every 5 (five) minutes as needed for Chest pain.    pantoprazole (PROTONIX) 40 MG tablet Take 1 tablet (40 mg total) by mouth once daily. (Patient not taking: Reported on 5/24/2023)    potassium chloride SA (K-DUR,KLOR-CON) 20 MEQ tablet Take 1 tablet (20 mEq total) by mouth once daily. (Patient not taking: Reported on 5/24/2023)    tiZANidine (ZANAFLEX) 4 MG tablet Take 4 mg by mouth 2 (two) times daily as needed.     Family History       Problem Relation (Age of Onset)    Cancer Mother (55), Father    Cirrhosis Paternal Aunt, Paternal Uncle    Colon cancer Maternal Uncle    Esophageal cancer Father    Heart disease Maternal Uncle    Liver disease Paternal Aunt, Paternal Uncle    No Known Problems Brother, Brother, Sister, Maternal Aunt, Maternal Grandfather, Paternal Grandmother, Paternal Grandfather    Parkinsonism Maternal Grandmother    Tremor Maternal Grandmother          Tobacco Use    Smoking status: Never    Smokeless tobacco: Never   Substance and Sexual Activity    Alcohol use: Never    Drug use: Yes     Types: Marijuana     Comment: "medical marijuana gummies"    Sexual activity: Never     Partners: Male     Review of Systems   Constitutional:  Negative for chills and fever.   HENT:  Negative for trouble swallowing.    Respiratory:  Positive for shortness of breath (chronic).    Cardiovascular:  Positive for leg swelling. Negative for chest pain.   Gastrointestinal:  Negative for abdominal pain, nausea and vomiting.   Genitourinary:  Negative for decreased urine volume.        Suprapubic catheter   Musculoskeletal:  Positive for arthralgias, back pain, gait problem and myalgias.   Skin:         " Redness and weeping BLE   Neurological:  Negative for dizziness, light-headedness and headaches.   Psychiatric/Behavioral:  Negative for agitation and confusion. The patient is not nervous/anxious.    Objective:     Vital Signs (Most Recent):  Temp: 98.2 °F (36.8 °C) (23)  Pulse: 78 (23 0035)  Resp: 18 (23)  BP: (!) 113/54 (23)  SpO2: 98 % (23) Vital Signs (24h Range):  Temp:  [97.6 °F (36.4 °C)-98.2 °F (36.8 °C)] 98.2 °F (36.8 °C)  Pulse:  [77-89] 78  Resp:  [14-20] 18  SpO2:  [97 %-99 %] 98 %  BP: (102-116)/(54-68) 113/54     Weight: 106.6 kg (235 lb 0.2 oz)  Body mass index is 39.11 kg/m².     Physical Exam  Vitals and nursing note reviewed.   Constitutional:       General: She is not in acute distress.     Appearance: She is obese. She is ill-appearing.   HENT:      Head: Normocephalic and atraumatic.   Cardiovascular:      Rate and Rhythm: Normal rate and regular rhythm.      Pulses:           Dorsalis pedis pulses are 1+ on the right side and 1+ on the left side.   Musculoskeletal:      Right lower le+ Pitting Edema present.      Left lower le+ Pitting Edema present.   Skin:     General: Skin is warm.      Findings: Erythema present.      Comments: Erythema, weeping, warmth, tenderness BLE   Neurological:      Mental Status: She is oriented to person, place, and time and easily aroused. She is lethargic.   Psychiatric:         Mood and Affect: Mood normal.         Behavior: Behavior normal.         Thought Content: Thought content normal.         Judgment: Judgment normal.              Significant Labs: All pertinent labs within the past 24 hours have been reviewed.    Significant Imaging: I have reviewed all pertinent imaging results/findings within the past 24 hours.    Assessment/Plan:     * Lymphedema  · Chronic but acutely worse with associated increase in pain  · IV Lasix 60 mg b.i.d.  · BLE ultrasound to assess perfusion pending  · MALLORIE hose  ordered  · If no improvement from Lasix, may benefit from general surgery consult for recommendations  · Continue pain management with intrathecal pump and home pain medication regimen  · Hold p.r.n. pain medications or muscle relaxers for SBP <100 or increased lethargy        Intractable pain  · Continue pain management with intrathecal pump and home pain medication regimen  · Hold p.r.n. pain medications or muscle relaxers for SBP <100 or increased lethargy      Debility  · Will order PT and OT      Leg pain, bilateral  · See above for plan      Chronic diastolic heart failure        Chest pain  · Chronic   · Reported on arrival, but patient denies on further assessment   · Trop negative   · EKG:  Sinus rhythm with PVCs, HR 78, no ST or T-wave elevation noted  · Maintain telemetry      Suprapubic catheter  · Chronic   · Neurogenic bladder      Sleep apnea  · Continue home CPAP        VTE Risk Mitigation (From admission, onward)         Ordered     enoxaparin injection 40 mg  Daily         05/24/23 2034     IP VTE HIGH RISK PATIENT  Once         05/24/23 2034     Place MALLORIE hose  Until discontinued         05/24/23 2034                     On 05/25/2023, patient should be placed in hospital observation services under my care in collaboration with Bharti Sullivan MD.      Lisa Carrasco NP  Department of Hospital Medicine  [facility] S[facility]

## 2023-05-25 NOTE — PLAN OF CARE
SW met with pt at bedside prior to rounds this AM. Pt noted to be sleeping and hard to arouse. SW called pt name, pt would awaken and fall back to sleep.       SW then returned to pt room after lunch to assess at bedside. Pt remain sleeping.  SW noted pt spouse Dangelo to be emergency contact and SW called spouse to complete assessment.     SW spoke with pt spouse Dangelo via telephone [phone] to complete assessment. Spouse able to confirm pt name, , and address. Pt is reported to live at home with her spouse Dangelo who assist with ADL's. Dangelo reports pt to also receive Home health support through [employer]. Pt is reported able to ambulate with walker and has home O2. Pt has CPAP however pt reports ill fitting mask. Pt spouse educated on how to follow up with sleep clinic. Pt is active with pain clinic.  At time of discharge pt family to transport home. SW updated whiteboard with [facility] name and contact information. SW confirmed pt understanding of Observation unit and expected discharge plan. SW will continue to follow pt throughout care and assist with any discharge needs.          23 1300   Discharge Planning   Assessment Type Discharge Planning Brief Assessment   Resource/Environmental Concerns none   Support Systems Spouse/significant other;Home care staff   Equipment Currently Used at Home walker, rolling;oxygen   Current Living Arrangements home   Patient/Family Anticipates Transition to home with family   Patient/Family Anticipated Services at Transition home health care   DME Needed Upon Discharge  none   Discharge Plan A Home with family;Home Health       Future Appointments   Date Time Provider Department Center   2023  1:30 PM Efe Hickey MD [facility] ENDOD[facility]REE [facility]   2023 11:20 AM Mesfin Hodges II, MD [facility] IM Thierry [facility] PCW   2023  8:00 AM Jose Ortiz MD [facility] PULMSVC [facility]ry [facility]   2023 10:00 AM Kathryn Pittman MD [facility] ALLERG [place]   2023 11:00 AM Mesfin Hodges II,  MD [facility] DANY [facility] PCW     KAILEY EsquedaW  [facility]  [phone]

## 2023-05-25 NOTE — PROGRESS NOTES
Evaluated patient.  She is somnolent likely secondary to narcotics.  Opens eyes to verbal stimuli answers a few questions and returns to sleep.  She reports persistent pain to bilateral lower extremities.  Patient on IV Lasix with good diuresis.    Labs reviewed.  Potassium 3.3.  Ultrasound of bilateral lower extremities negative for DVT    Continue plan of care.  Add oral potassium 40 b.i.d. x4 doses consult Wound Care.  Add CRP  Questionable cellulitis versus dermatitis to bilateral lower extremity  Add BiPAP while sleeping and p.r.n..  Patient with CO2 of 70 noted on ABGs.

## 2023-05-25 NOTE — PHARMACY MED REC
"      [facility]           Pharmacy  Admission Medication History     The home medication history was taken by Marisel Cruz.      Medication history obtained from Medications listed below were obtained from: [employer] software- [employer]    Based on information gathered for medication list, you may go to "Admission" then "Reconcile Home Medications" tabs to review and/or act upon those items.     The home medication list has been updated by the Pharmacy department.   Please read ALL comments highlighted in yellow.   Please address this information as you see fit.    Feel free to contact us if you have any questions or require assistance.        No current facility-administered medications on file prior to encounter.     Current Outpatient Medications on File Prior to Encounter   Medication Sig Dispense Refill    albuterol-ipratropium (DUO-NEB) 2.5 mg-0.5 mg/3 mL nebulizer solution Take 3 mLs by nebulization every 6 (six) hours as needed for Wheezing or Shortness of Breath. Rescue 90 mL 0    aspirin (ECOTRIN) 81 MG EC tablet Take 1 tablet (81 mg total) by mouth once daily. 90 tablet 3    butalbital-acetaminophen-caffeine -40 mg (FIORICET, ESGIC) -40 mg per tablet Take 1 tablet by mouth daily as needed. 15 tablet 0    cyanocobalamin, vitamin B-12, 5,000 mcg Subl Place 1 tablet under the tongue once daily.      docusate sodium (COLACE) 100 MG capsule Take 200 mg by mouth every evening.      FLUoxetine 20 MG capsule Take 3 capsules (60 mg total) by mouth once daily. 270 capsule 3    fluticasone propionate (FLONASE) 50 mcg/actuation nasal spray 2 sprays (100 mcg total) by Each Nostril route once daily. 16 g 0    furosemide (LASIX) 40 MG tablet Take 1.5 tablets (60 mg total) by mouth once daily. 90 tablet 3    HYDROcodone-acetaminophen (NORCO)  mg per tablet Take 1 tablet by mouth every 6 (six) hours as needed for breakthrough pain.      hydrocortisone (CORTEF) 10 MG Tab Take 1 tab " in the morning and 0.5 tab in the afternoon (Patient taking differently: Take 10 mg by mouth every morning.) 135 tablet 3    hydrocortisone (CORTEF) 10 MG Tab Take 5 mg by mouth every evening. 1/2 tablet      hydrOXYzine (ATARAX) 50 MG tablet Take 1 tablet (50 mg total) by mouth every 4 (four) hours as needed for Anxiety. 30 tablet 0    intrathecal pain pump compound Hydromorphone (7.5 mg/mL) infusion at 8.59 mg/day (0.3578 mg/hr) out of a total reservoir volume of 40 mL  Pump filled monthly      levothyroxine (SYNTHROID) 150 MCG tablet Take 1 tablet (150 mcg total) by mouth before breakfast. 30 tablet 11    LIDOcaine (LIDODERM) 5 % Place 1 patch onto the skin once daily. Remove & Discard patch within 12 hours or as directed by MD  0    liothyronine (CYTOMEL) 25 MCG Tab Take 1 tablet (25 mcg total) by mouth once daily. 30 tablet 11    mirabegron (MYRBETRIQ) 50 mg Tb24 Take 1 tablet (50 mg total) by mouth once daily. 90 tablet 3    ondansetron (ZOFRAN-ODT) 4 MG TbDL Take 4 mg by mouth every 4 to 6 hours as needed.      promethazine (PHENERGAN) 25 MG tablet Take 25 mg by mouth every 6 (six) hours as needed for Nausea.      QUEtiapine (SEROQUEL) 100 MG Tab TAKE 2 TABLETS (200 MG) BY MOUTH NIGHTLY (Patient taking differently: Take 200 mg by mouth nightly.) 180 tablet 3    senna (SENNA LAX) 8.6 mg tablet Take 2 tablets by mouth 2 (two) times daily.      tiotropium bromide (SPIRIVA RESPIMAT) 2.5 mcg/actuation inhaler Inhale 2 puffs into the lungs once daily. Controller 4 g 0    traZODone (DESYREL) 300 MG tablet Take 1 tablet (300 mg total) by mouth every evening. 90 tablet 0    atorvastatin (LIPITOR) 80 MG tablet TAKE ONE TABLET BY MOUTH EVERY DAY (Patient taking differently: Take 80 mg by mouth once daily.) 90 tablet 3    fludrocortisone (FLORINEF) 0.1 mg Tab Take 1 tablet (100 mcg total) by mouth once daily. (Patient not taking: Reported on 5/24/2023) 90 tablet 2    ketorolac (TORADOL) 10 mg tablet Take 10 mg by mouth  daily as needed.      nitroGLYCERIN (NITROSTAT) 0.4 MG SL tablet Place 0.4 mg under the tongue every 5 (five) minutes as needed for Chest pain.      pantoprazole (PROTONIX) 40 MG tablet Take 1 tablet (40 mg total) by mouth once daily. (Patient not taking: Reported on 5/24/2023) 90 tablet 3    potassium chloride SA (K-DUR,KLOR-CON) 20 MEQ tablet Take 1 tablet (20 mEq total) by mouth once daily. (Patient not taking: Reported on 5/24/2023) 180 tablet 3    tiZANidine (ZANAFLEX) 4 MG tablet Take 4 mg by mouth 2 (two) times daily as needed.         Please address this information as you see fit.  Feel free to contact us if you have any questions or require assistance.    Marisel Cruz  [phone]            .

## 2023-05-25 NOTE — CONSULTS
"[facility]  Wound Care    Patient Name:  Tasha Hawley   MRN:  [id number]  Date: 5/25/2023  Diagnosis: Lymphedema    History:     Past Medical History:   Diagnosis Date    Anticoagulant long-term use     Anxiety     Arthritis     Bilateral lower extremity edema     severe chronic    Carotid artery occlusion     Cataract     CHF (congestive heart failure)     Coronary artery disease     subtotalled LAD with collateral    Depression     Fever blister     Hard of hearing     Hypokalemia 1/9/2023    Hyponatremia 2/4/2022    Hypothyroid     Iron deficiency anemia     Lumbar radiculopathy     with chronic pain    Ocular migraine     Other emphysema 5/22/2023    Renal disorder     Sleep apnea     cpap       Social History     Socioeconomic History    Marital status:    Tobacco Use    Smoking status: Never    Smokeless tobacco: Never   Substance and Sexual Activity    Alcohol use: Never    Drug use: Yes     Types: Marijuana     Comment: "medical marijuana gummies"    Sexual activity: Never     Partners: Male     Social Determinants of Health     Financial Resource Strain: Low Risk     Difficulty of Paying Living Expenses: Not hard at all   Food Insecurity: No Food Insecurity    Worried About Running Out of Food in the Last Year: Never true    Ran Out of Food in the Last Year: Never true   Transportation Needs: No Transportation Needs    Lack of Transportation (Medical): No    Lack of Transportation (Non-Medical): No   Physical Activity: Insufficiently Active    Days of Exercise per Week: 4 days    Minutes of Exercise per Session: 10 min   Stress: No Stress Concern Present    Feeling of Stress : Only a little   Social Connections: Moderately Isolated    Frequency of Communication with Friends and Family: More than three times a week    Frequency of Social Gatherings with Friends and Family: Three times a week    Attends Judaism Services: Never    Active Member of Clubs or Organizations: No    Attends Club or " Organization Meetings: Never    Marital Status:    Housing Stability: Low Risk     Unable to Pay for Housing in the Last Year: No    Number of Places Lived in the Last Year: 1    Unstable Housing in the Last Year: No       Precautions:     Allergies as of 05/24/2023 - Reviewed 05/24/2023   Allergen Reaction Noted    (d)-limonene flavor  09/05/2012    Bactrim [sulfamethoxazole-trimethoprim] Anaphylaxis 09/05/2012    Benadryl [diphenhydramine hcl] Shortness Of Breath 06/08/2018    Fentanyl Itching, Nausea And Vomiting, and Swelling 09/05/2012    Imitrex [sumatriptan succinate] Shortness Of Breath 01/03/2013    Percocet [oxycodone-acetaminophen] Shortness Of Breath 04/22/2015    Topamax [topiramate] Shortness Of Breath 01/03/2013    Vancomycin Anaphylaxis 09/05/2012    Butorphanol tartrate  10/25/2016    Darvocet a500 [propoxyphene n-acetaminophen]  09/05/2012    Evening primrose (oenothera biennis)  09/25/2022    Lyrica [pregabalin] Other (See Comments) 04/03/2023    White petrolatum-zinc  01/23/2017    Zinc oxide-white petrolatum  09/05/2012    Latex, natural rubber Itching and Rash 02/19/2013    Phenytoin Rash and Other (See Comments) 10/02/2018       [facility] Assessment Details/Treatment     Media file not available.  BLE- erythema- edema- weeping- Oscar Hose in place- Pt c/o pain when BLE touched.  Spoke with pt, nurse and Nicolette Rubio NP recommending xeroform dressing/Kerlix/Ace and will further assess for pt to be able to tolerate higher compression once diuretic therapy takes affect.     05/25/2023

## 2023-05-26 LAB
ANION GAP SERPL CALC-SCNC: 6 MMOL/L (ref 8–16)
BASOPHILS # BLD AUTO: 0.01 K/UL (ref 0–0.2)
BASOPHILS NFR BLD: 0.3 % (ref 0–1.9)
BUN SERPL-MCNC: 8 MG/DL (ref 8–23)
CALCIUM SERPL-MCNC: 9 MG/DL (ref 8.7–10.5)
CHLORIDE SERPL-SCNC: 100 MMOL/L (ref 95–110)
CO2 SERPL-SCNC: 34 MMOL/L (ref 23–29)
CREAT SERPL-MCNC: 0.8 MG/DL (ref 0.5–1.4)
DIFFERENTIAL METHOD: ABNORMAL
EOSINOPHIL # BLD AUTO: 0.3 K/UL (ref 0–0.5)
EOSINOPHIL NFR BLD: 8.8 % (ref 0–8)
ERYTHROCYTE [DISTWIDTH] IN BLOOD BY AUTOMATED COUNT: 13.9 % (ref 11.5–14.5)
EST. GFR  (NO RACE VARIABLE): >60 ML/MIN/1.73 M^2
GLUCOSE SERPL-MCNC: 87 MG/DL (ref 70–110)
HCT VFR BLD AUTO: 29.9 % (ref 37–48.5)
HGB BLD-MCNC: 8.8 G/DL (ref 12–16)
IMM GRANULOCYTES # BLD AUTO: 0.01 K/UL (ref 0–0.04)
IMM GRANULOCYTES NFR BLD AUTO: 0.3 % (ref 0–0.5)
LYMPHOCYTES # BLD AUTO: 1 K/UL (ref 1–4.8)
LYMPHOCYTES NFR BLD: 30.7 % (ref 18–48)
MAGNESIUM SERPL-MCNC: 2 MG/DL (ref 1.6–2.6)
MCH RBC QN AUTO: 25.3 PG (ref 27–31)
MCHC RBC AUTO-ENTMCNC: 29.4 G/DL (ref 32–36)
MCV RBC AUTO: 86 FL (ref 82–98)
MONOCYTES # BLD AUTO: 0.5 K/UL (ref 0.3–1)
MONOCYTES NFR BLD: 13.7 % (ref 4–15)
NEUTROPHILS # BLD AUTO: 1.5 K/UL (ref 1.8–7.7)
NEUTROPHILS NFR BLD: 46.2 % (ref 38–73)
NRBC BLD-RTO: 0 /100 WBC
PLATELET # BLD AUTO: 230 K/UL (ref 150–450)
PMV BLD AUTO: 9.7 FL (ref 9.2–12.9)
POTASSIUM SERPL-SCNC: 4.2 MMOL/L (ref 3.5–5.1)
RBC # BLD AUTO: 3.48 M/UL (ref 4–5.4)
SODIUM SERPL-SCNC: 140 MMOL/L (ref 136–145)
TROPONIN I SERPL DL<=0.01 NG/ML-MCNC: <0.006 NG/ML (ref 0–0.03)
WBC # BLD AUTO: 3.29 K/UL (ref 3.9–12.7)

## 2023-05-26 PROCEDURE — 36415 COLL VENOUS BLD VENIPUNCTURE: CPT | Performed by: HOSPITALIST

## 2023-05-26 PROCEDURE — 27000221 HC OXYGEN, UP TO 24 HOURS

## 2023-05-26 PROCEDURE — 93010 EKG 12-LEAD: ICD-10-PCS | Mod: ,,, | Performed by: INTERNAL MEDICINE

## 2023-05-26 PROCEDURE — 25000003 PHARM REV CODE 250: Performed by: NURSE PRACTITIONER

## 2023-05-26 PROCEDURE — 36415 COLL VENOUS BLD VENIPUNCTURE: CPT | Performed by: NURSE PRACTITIONER

## 2023-05-26 PROCEDURE — 97165 OT EVAL LOW COMPLEX 30 MIN: CPT

## 2023-05-26 PROCEDURE — 94761 N-INVAS EAR/PLS OXIMETRY MLT: CPT

## 2023-05-26 PROCEDURE — 97530 THERAPEUTIC ACTIVITIES: CPT

## 2023-05-26 PROCEDURE — 63600175 PHARM REV CODE 636 W HCPCS: Performed by: NURSE PRACTITIONER

## 2023-05-26 PROCEDURE — 96372 THER/PROPH/DIAG INJ SC/IM: CPT | Performed by: NURSE PRACTITIONER

## 2023-05-26 PROCEDURE — G0378 HOSPITAL OBSERVATION PER HR: HCPCS

## 2023-05-26 PROCEDURE — 83735 ASSAY OF MAGNESIUM: CPT | Performed by: NURSE PRACTITIONER

## 2023-05-26 PROCEDURE — 99900035 HC TECH TIME PER 15 MIN (STAT)

## 2023-05-26 PROCEDURE — 97161 PT EVAL LOW COMPLEX 20 MIN: CPT

## 2023-05-26 PROCEDURE — 85025 COMPLETE CBC W/AUTO DIFF WBC: CPT | Performed by: NURSE PRACTITIONER

## 2023-05-26 PROCEDURE — 93010 ELECTROCARDIOGRAM REPORT: CPT | Mod: ,,, | Performed by: INTERNAL MEDICINE

## 2023-05-26 PROCEDURE — 80048 BASIC METABOLIC PNL TOTAL CA: CPT | Performed by: NURSE PRACTITIONER

## 2023-05-26 PROCEDURE — 93005 ELECTROCARDIOGRAM TRACING: CPT

## 2023-05-26 PROCEDURE — 96374 THER/PROPH/DIAG INJ IV PUSH: CPT | Mod: 59

## 2023-05-26 PROCEDURE — 96376 TX/PRO/DX INJ SAME DRUG ADON: CPT

## 2023-05-26 PROCEDURE — 84484 ASSAY OF TROPONIN QUANT: CPT | Performed by: HOSPITALIST

## 2023-05-26 PROCEDURE — 94640 AIRWAY INHALATION TREATMENT: CPT

## 2023-05-26 PROCEDURE — 94660 CPAP INITIATION&MGMT: CPT

## 2023-05-26 RX ADMIN — ATORVASTATIN CALCIUM 80 MG: 40 TABLET, FILM COATED ORAL at 08:05

## 2023-05-26 RX ADMIN — FUROSEMIDE 60 MG: 10 INJECTION, SOLUTION INTRAMUSCULAR; INTRAVENOUS at 09:05

## 2023-05-26 RX ADMIN — LEVOTHYROXINE SODIUM 150 MCG: 75 TABLET ORAL at 05:05

## 2023-05-26 RX ADMIN — TRAZODONE HYDROCHLORIDE 300 MG: 100 TABLET ORAL at 09:05

## 2023-05-26 RX ADMIN — HYDROCODONE BITARTRATE AND ACETAMINOPHEN 1 TABLET: 10; 325 TABLET ORAL at 05:05

## 2023-05-26 RX ADMIN — LIOTHYRONINE SODIUM 25 MCG: 25 TABLET ORAL at 08:05

## 2023-05-26 RX ADMIN — POTASSIUM CHLORIDE 40 MEQ: 1500 TABLET, EXTENDED RELEASE ORAL at 08:05

## 2023-05-26 RX ADMIN — HYDROCODONE BITARTRATE AND ACETAMINOPHEN 1 TABLET: 10; 325 TABLET ORAL at 09:05

## 2023-05-26 RX ADMIN — FERROUS SULFATE TAB 325 MG (65 MG ELEMENTAL FE) 1 EACH: 325 (65 FE) TAB at 08:05

## 2023-05-26 RX ADMIN — FLUOXETINE HYDROCHLORIDE 60 MG: 20 CAPSULE ORAL at 08:05

## 2023-05-26 RX ADMIN — QUETIAPINE FUMARATE 200 MG: 100 TABLET ORAL at 09:05

## 2023-05-26 RX ADMIN — OXYBUTYNIN CHLORIDE 10 MG: 5 TABLET, EXTENDED RELEASE ORAL at 08:05

## 2023-05-26 RX ADMIN — SENNOSIDES 2 TABLET: 8.6 TABLET, FILM COATED ORAL at 09:05

## 2023-05-26 RX ADMIN — ENOXAPARIN SODIUM 40 MG: 40 INJECTION SUBCUTANEOUS at 04:05

## 2023-05-26 RX ADMIN — TIOTROPIUM BROMIDE INHALATION SPRAY 2 PUFF: 3.12 SPRAY, METERED RESPIRATORY (INHALATION) at 08:05

## 2023-05-26 RX ADMIN — HYDROCORTISONE 10 MG: 5 TABLET ORAL at 06:05

## 2023-05-26 RX ADMIN — HYDROCODONE BITARTRATE AND ACETAMINOPHEN 1 TABLET: 10; 325 TABLET ORAL at 03:05

## 2023-05-26 RX ADMIN — POTASSIUM CHLORIDE 40 MEQ: 1500 TABLET, EXTENDED RELEASE ORAL at 09:05

## 2023-05-26 RX ADMIN — SENNOSIDES 2 TABLET: 8.6 TABLET, FILM COATED ORAL at 08:05

## 2023-05-26 RX ADMIN — LIDOCAINE 1 PATCH: 50 PATCH CUTANEOUS at 08:05

## 2023-05-26 RX ADMIN — HYDROCORTISONE 5 MG: 5 TABLET ORAL at 09:05

## 2023-05-26 RX ADMIN — DOCUSATE SODIUM 200 MG: 100 CAPSULE, LIQUID FILLED ORAL at 09:05

## 2023-05-26 RX ADMIN — ASPIRIN 81 MG: 81 TABLET, COATED ORAL at 08:05

## 2023-05-26 RX ADMIN — FUROSEMIDE 60 MG: 10 INJECTION, SOLUTION INTRAMUSCULAR; INTRAVENOUS at 08:05

## 2023-05-26 NOTE — PLAN OF CARE
Pt AAO x4.  VSS.  Pt remained afebrile throughout this shift.   IV lasix administered per order.   Pt remained free of falls this shift.   Pt c/o of pain this shift.  Pain meds administered as ordered.   Plan of care reviewed. Patient verbalizes understanding.   Pt moving/turing with assistance. Frequent weight shifting encouraged.  Patient NSR on monitor.   Wound care done  Bed low, side rails up x 2, wheels locked, call light in reach.   Bed alarm maintained for safety.   Patient instructed to call for assistance.   Hourly rounding completed.   24 hour chart check completed.  Will continue to monitor.

## 2023-05-26 NOTE — SUBJECTIVE & OBJECTIVE
Interval History:  Reports chest pain this a.m. as well as bilateral lower extremity discomfort.    Review of Systems   Cardiovascular:  Positive for chest pain and leg swelling.   Objective:     Vital Signs (Most Recent):  Temp: 98.7 °F (37.1 °C) (23 1535)  Pulse: 62 (23 1543)  Resp: 18 (23 1549)  BP: (!) 99/50 (23 1535)  SpO2: 100 % (23 1535) Vital Signs (24h Range):  Temp:  [96.4 °F (35.8 °C)-98.7 °F (37.1 °C)] 98.7 °F (37.1 °C)  Pulse:  [57-73] 62  Resp:  [16-20] 18  SpO2:  [94 %-100 %] 100 %  BP: ()/(49-68) 99/50     Weight: 106.9 kg (235 lb 10.8 oz)  Body mass index is 39.22 kg/m².    Intake/Output Summary (Last 24 hours) at 2023 1714  Last data filed at 2023 0632  Gross per 24 hour   Intake 860 ml   Output 1875 ml   Net -1015 ml         Physical Exam  Vitals and nursing note reviewed.   Constitutional:       General: She is not in acute distress.     Appearance: She is obese. She is ill-appearing.   HENT:      Head: Normocephalic and atraumatic.   Cardiovascular:      Rate and Rhythm: Normal rate and regular rhythm.      Pulses:           Dorsalis pedis pulses are 1+ on the right side and 1+ on the left side.   Musculoskeletal:      Right lower le+ Pitting Edema present.      Left lower le+ Pitting Edema present.   Skin:     General: Skin is warm.      Findings: Erythema present.      Comments: Bilateral lower extremities bandaged; see wound care photos   Neurological:      Mental Status: She is alert, oriented to person, place, and time and easily aroused.   Psychiatric:         Mood and Affect: Mood normal.         Behavior: Behavior normal.         Thought Content: Thought content normal.         Judgment: Judgment normal.           Significant Labs: All pertinent labs within the past 24 hours have been reviewed.    Significant Imaging: I have reviewed all pertinent imaging results/findings within the past 24 hours.

## 2023-05-26 NOTE — PLAN OF CARE
"Pt would benefit from cont OT services in order to maximize functional independence. Recommending moderate intensity therapy upon d/c. Pt limited this date by increased pain BLE with side steps to HOB, stating she has "blisters". Pt noted with small wound on R buttocks; nsg notified. Pt taking side steps to HOB with Min/CGA & RW. Pt returned to supine with ModA. Will progress as able.     Problem: Occupational Therapy  Goal: Occupational Therapy Goal  Description: Goals to be met by: 6/26/2023     Patient will increase functional independence with ADLs by performing:    Toileting from bedside commode with Minimal Assistance for hygiene and clothing management.   Step transfer with Supervision & appropriate AD.  Toilet transfer to bedside commode with Supervision & appropriate AD.    Outcome: Ongoing, Progressing     "

## 2023-05-26 NOTE — PROGRESS NOTES
[facility] Medicine  Progress Note    Patient Name: Tasha Hawley  MRN: [id number]  Patient Class: OP- Observation   Admission Date: 5/24/2023  Length of Stay: 0 days  Attending Physician: Nic Mistry, *  Primary Care Provider: Mesfin Hodges Ii, MD        Subjective:     Principal Problem:Lymphedema        HPI:  Tasha Hawley is a 67 year old female w/ a significant PMH including chronic pain with long-term opioid use, recurrent UTIs, lymphedema, debility, chronic diastolic HF, neurogenic bladder w/ suprapubic catheter, depression, and anxiety. She was recently discharged after admission for pneumonia, for which she completed a course of PO abx. She presented to the ED with c/o chronic BLE pain that is worsening as well as increased BLE edema. She reports compliance with lasix. She has chronic lymphedema of BLE, however, her  reports it is much worse over the last 2 days and now with weeping. She also reported CP in the ED, however, denies that on my exam. She has chronic SOB and is on home O2 4L, she denies change in SOB. Patient lethargic but easily arousable on exam. Her , Dangelo, answered majority of history questions.           Overview/Hospital Course:  No notes on file    Interval History:  Reports chest pain this a.m. as well as bilateral lower extremity discomfort.    Review of Systems   Cardiovascular:  Positive for chest pain and leg swelling.   Objective:     Vital Signs (Most Recent):  Temp: 98.7 °F (37.1 °C) (05/26/23 1535)  Pulse: 62 (05/26/23 1543)  Resp: 18 (05/26/23 1549)  BP: (!) 99/50 (05/26/23 1535)  SpO2: 100 % (05/26/23 1535) Vital Signs (24h Range):  Temp:  [96.4 °F (35.8 °C)-98.7 °F (37.1 °C)] 98.7 °F (37.1 °C)  Pulse:  [57-73] 62  Resp:  [16-20] 18  SpO2:  [94 %-100 %] 100 %  BP: ()/(49-68) 99/50     Weight: 106.9 kg (235 lb 10.8 oz)  Body mass index is 39.22 kg/m².    Intake/Output Summary (Last 24 hours) at 5/26/2023 1714  Last data  filed at 2023 0632  Gross per 24 hour   Intake 860 ml   Output 1875 ml   Net -1015 ml         Physical Exam  Vitals and nursing note reviewed.   Constitutional:       General: She is not in acute distress.     Appearance: She is obese. She is ill-appearing.   HENT:      Head: Normocephalic and atraumatic.   Cardiovascular:      Rate and Rhythm: Normal rate and regular rhythm.      Pulses:           Dorsalis pedis pulses are 1+ on the right side and 1+ on the left side.   Musculoskeletal:      Right lower le+ Pitting Edema present.      Left lower le+ Pitting Edema present.   Skin:     General: Skin is warm.      Findings: Erythema present.      Comments: Bilateral lower extremities bandaged; see wound care photos   Neurological:      Mental Status: She is alert, oriented to person, place, and time and easily aroused.   Psychiatric:         Mood and Affect: Mood normal.         Behavior: Behavior normal.         Thought Content: Thought content normal.         Judgment: Judgment normal.           Significant Labs: All pertinent labs within the past 24 hours have been reviewed.    Significant Imaging: I have reviewed all pertinent imaging results/findings within the past 24 hours.      Assessment/Plan:      * Lymphedema  · Chronic but acutely worse with associated increase in pain  · IV Lasix 60 mg b.i.d.  · BLE ultrasound to assess perfusion pending  · MALLORIE hose ordered  · If no improvement from Lasix, may benefit from general surgery consult for recommendations  · Continue pain management with intrathecal pump and home pain medication regimen  · Hold p.r.n. pain medications or muscle relaxers for SBP <100 or increased lethargy        Intractable pain  · Continue pain management with intrathecal pump and home pain medication regimen  · Hold p.r.n. pain medications or muscle relaxers for SBP <100 or increased lethargy      Severe obesity (BMI 35.0-39.9) with comorbidity  Body mass index is 39.22 kg/m².  Morbid obesity complicates all aspects of disease management from diagnostic modalities to treatment. Weight loss encouraged and health benefits explained to patient.         Adrenal insufficiency  -continue home medications      Debility  · Will order PT and OT  · Expect will benefit from home health      Leg pain, bilateral  · See above for plan      Chronic diastolic heart failure        Chest pain  · Chronic   · Appeared again this a.m.; EKG and troponin unconcerning   · Telemetry      Suprapubic catheter  · Chronic   · Neurogenic bladder      Hypothyroidism (acquired)  -continue home medications      Narcotic dependency, continuous  -see above      Sleep apnea  · Continue home CPAP        VTE Risk Mitigation (From admission, onward)         Ordered     enoxaparin injection 40 mg  Daily         05/24/23 2034     IP VTE HIGH RISK PATIENT  Once         05/24/23 2034                Discharge Planning   JACKIE:      Code Status: Full Code   Is the patient medically ready for discharge?:     Reason for patient still in hospital (select all that apply): Patient trending condition and Treatment  Discharge Plan A: Home with family, Home Health                  Nic Mistry MD  Department of Hospital Medicine   [facility]

## 2023-05-26 NOTE — NURSING
ALEXY Duval notified of patient sustaining HR in the 40s. Patient is resting quietly in bed and reports having suprapubic pain and feeling week. Will continue to monitor patient.   
Patient has had 5 episodes of loose stools since shift change at 7pm.  assists with care at bedside. Bedside commode placed in room to decrease distance to restroom. All stools loose and small to medium in size.   
Patient reporting worsening of lower abdominal pain. PRN pain medication administered. Notified Dr. Concepcion of patient complaint. Order for magnesium citrate to be placed. Patient has not had a bowel movement since 6/5/18. Will administer and continue to monitor.   
Pt is awake and alert,oriented x4.Pt continued to be NSR on telemetry with HR in the 60's.No ectopy noted.Denies any pain. No distress noted. IV removed from right forearm,cath tip intact. Tele monitor removed. Discharge instructions given to patient. Discussed side effects of medications. All questions answered. Verbalized understanding. Patient waiting on transportation.  
Received report from REJI Carmichael. Patient resting quietly in bed. Bed alarm on, bed locked and low, side rails up x2, and call bell in reach. Tele monitor in place.   
[NS_AttendInformed_OBGYN_ALL_OB:[id number]=]

## 2023-05-26 NOTE — PT/OT/SLP EVAL
"Occupational Therapy   Evaluation    Name: Tasha Hawley  MRN: [id number]  Admitting Diagnosis: Lymphedema  Recent Surgery: * No surgery found *      Recommendations:     Discharge Recommendations: other (see comments) (moderate intenisty therapy)  Discharge Equipment Recommendations:  to be determined by next level of care  Barriers to discharge:  Other (Comment) (Pt requires increased level of assistance)    Assessment:     Tasha Hawley is a 67 y.o. female with a medical diagnosis of Lymphedema.  She presents with The primary encounter diagnosis was Intractable pain. Diagnoses of Shortness of breath, Leg pain, bilateral, Lymphedema, Chest pain, unspecified type, and Chest pain were also pertinent to this visit. Performance deficits affecting function: weakness, impaired functional mobility, gait instability, impaired endurance, pain, decreased coordination, decreased upper extremity function, decreased lower extremity function, impaired cognition, decreased safety awareness, impaired coordination, impaired balance, impaired self care skills, decreased ROM, impaired skin, edema, impaired joint extensibility.      Pt would benefit from cont OT services in order to maximize functional independence. Recommending moderate intensity therapy upon d/c. Pt limited this date by increased pain BLE with side steps to HOB, stating she has "blisters". Pt noted with small wound on R buttocks; nsg notified. Pt taking side steps to HOB with Min/CGA & RW. Pt returned to supine with ModA. Will progress as able.     Rehab Prognosis: Fair; patient would benefit from acute skilled OT services to address these deficits and reach maximum level of function.       Plan:     Patient to be seen 3 x/week to address the above listed problems via self-care/home management, therapeutic activities, therapeutic exercises  Plan of Care Expires: 06/26/23  Plan of Care Reviewed with: patient    Subjective     Chief Complaint: Pain " BLE  Patient/Family Comments/goals: Pt enjoying sitting EOB     Occupational Profile:  Living Environment: Pt lives w/spouse, SSH, ramp, tub/shower combo with TTB  Previous level of function: Pt reports taking only a few steps with RW, using WC for mobility, spouse assists with ADLs  Equipment Used at Home: bedside commode, bath bench, walker, rolling, wheelchair  Assistance upon Discharge: Spouse    Pain/Comfort:  Pain Rating 1: 10/10  Location - Orientation 1: lower  Location 1: back (& L hip)  Pain Addressed 1: Reposition, Cessation of Activity, Distraction, Nurse notified  Pain Rating Post-Intervention 1: other (see comments) (not rated)    Patients cultural, spiritual, Restorationist conflicts given the current situation: no    Objective:     Communicated with: nsg prior to session.  Patient found HOB elevated with bed alarm, PureWick, telemetry, peripheral IV upon OT entry to room.    General Precautions: Standard, fall  Orthopedic Precautions: N/A  Braces: N/A    Occupational Performance:    Bed Mobility:    Patient completed Supine to Sit with moderate assistance  Patient completed Sit to Supine with moderate assistance    Functional Mobility/Transfers:  Patient completed Sit <> Stand Transfer with minimum assistance  with  rolling walker   Functional Mobility: Pt taking ~3-4 side steps with Maureen & RW; one seated rest break 2/2 pain in B feet    Cognitive/Visual Perceptual:  Cognitive/Psychosocial Skills:     -       Oriented to: Per, pt stating she is on a cruise ship, confused to month & year, oriented to reason for being in hospital   -       Memory: Impaired  -       Safety awareness/insight to disability: impaired   -       Mood/Affect/Coping skills/emotional control: Cooperative and Pleasant    Physical Exam:  Sensation:    -       Impaired  light/touch BUE pt reports ~25%  Upper Extremity Range of Motion:     -       Right Upper Extremity: Deficits: shoulder flex ~110 degrees, decreased IR  -       Left  "Upper Extremity: Deficits: shoulder flex ~110 degrees, decreased IR  Upper Extremity Strength:    -       Right Upper Extremity: 2+/5 prox, 3/5 dist  -       Left Upper Extremity: 2+/5 prox, 3/5 dist   Strength:    -       Right Upper Extremity: Fair  -       Left Upper Extremity: Fair    AMPAC 6 Click ADL:  AMPAC Total Score: 13    Treatment & Education:  Pt would benefit from cont OT services in order to maximize functional independence.   Pt limited this date by increased pain BLE with side steps to HOB, stating she has "blisters". Pt noted with small wound on R buttocks; nsg notified.   Pt taking side steps to HOB with Min/CGA & RW.   Pt returned to supine with ModA.   Waffle overlay on mattress.   Will progress as able.     Patient left HOB elevated with all lines intact, call button in reach, bed alarm on, and nsg notified    GOALS:   Multidisciplinary Problems       Occupational Therapy Goals          Problem: Occupational Therapy    Goal Priority Disciplines Outcome Interventions   Occupational Therapy Goal     OT, PT/OT Ongoing, Progressing    Description: Goals to be met by: 6/26/2023     Patient will increase functional independence with ADLs by performing:    Toileting from bedside commode with Minimal Assistance for hygiene and clothing management.   Step transfer with Supervision & appropriate AD.  Toilet transfer to bedside commode with Supervision & appropriate AD.                         History:     Past Medical History:   Diagnosis Date    Anticoagulant long-term use     Anxiety     Arthritis     Bilateral lower extremity edema     severe chronic    Carotid artery occlusion     Cataract     CHF (congestive heart failure)     Coronary artery disease     subtotalled LAD with collateral    Depression     Fever blister     Hard of hearing     Hypokalemia 1/9/2023    Hyponatremia 2/4/2022    Hypothyroid     Iron deficiency anemia     Lumbar radiculopathy     with chronic pain    Ocular migraine     " Other emphysema 5/22/2023    Renal disorder     Sleep apnea     cpap         Past Surgical History:   Procedure Laterality Date    ADENOIDECTOMY      BACK SURGERY      x 12    CARDIAC CATHETERIZATION  2016    subtotalled LAD with right to left collaterals    CATARACT EXTRACTION W/  INTRAOCULAR LENS IMPLANT Left     Dr Coleman     CYSTOSCOPIC LITHOLAPAXY N/A 6/27/2019    Procedure: CYSTOLITHOLAPAXY;  Surgeon: Shireen Mayo MD;  Location: [facility] OR 2ND FLR;  Service: Urology;  Laterality: N/A;    CYSTOSCOPIC LITHOLAPAXY N/A 9/3/2019    Procedure: CYSTOLITHOLAPAXY;  Surgeon: Shireen Mayo MD;  Location: [facility] OR 2ND FLR;  Service: Urology;  Laterality: N/A;    CYSTOSCOPY N/A 7/13/2021    Procedure: CYSTOSCOPY;  Surgeon: Shireen Mayo MD;  Location: [facility] OR 1ST FLR;  Service: Urology;  Laterality: N/A;    CYSTOSCOPY  11/16/2021    Procedure: CYSTOSCOPY;  Surgeon: Shireen Mayo MD;  Location: [facility] OR 1ST FLR;  Service: Urology;;    CYSTOSCOPY  7/19/2022    Procedure: CYSTOSCOPY;  Surgeon: Shireen Mayo MD;  Location: [facility] OR 1ST FLR;  Service: Urology;;    CYSTOSCOPY WITH INJECTION OF PERIURETHRAL BULKING AGENT  7/19/2022    Procedure: CYSTOSCOPY, WITH PERIURETHRAL BULKING AGENT INJECTION-MACROPLASTIQUE;  Surgeon: Shireen Mayo MD;  [facility];  Service: Urology;;    CYSTOSCOPY WITH INJECTION OF PERIURETHRAL BULKING AGENT N/A 3/28/2023    Procedure: CYSTOSCOPY, WITH PERIURETHRAL BULKING AGENT INJECTION;  Surgeon: Shireen Mayo MD;  Location: [facility] OR 1ST FLR;  Service: Urology;  Laterality: N/A;  [place]    CYSTOSCOPY,WITH BOTULINUM TOXIN INJECTION N/A 12/13/2022    Procedure: CYSTOSCOPY,WITH BOTULINUM TOXIN INJECTION;  Surgeon: Shireen Mayo MD;  Location: [facility] OR 1ST FLR;  Service: Urology;  Laterality: N/A;  300 U    CYSTOSCOPY,WITH BOTULINUM TOXIN INJECTION N/A 3/28/2023    Procedure: CYSTOSCOPY,WITH BOTULINUM TOXIN INJECTION;  Surgeon: Shireen Mayo MD;  Location: [facility]  OR 1ST FLR;  Service: Urology;  Laterality: N/A;  45 MIN.    300 UNITS    ESOPHAGOGASTRODUODENOSCOPY N/A 5/23/2018    Procedure: ESOPHAGOGASTRODUODENOSCOPY (EGD);  Surgeon: Prnice Vance MD;  Location: [facility] (4TH FLR);  Service: Endoscopy;  Laterality: N/A;  r/s 'd per Dr. Vance due to family emergency- ER    HYSTERECTOMY  1975    endometriosis    INJECTION OF BOTULINUM TOXIN TYPE A  7/13/2021    Procedure: INJECTION, BOTULINUM TOXIN, 200units;  Surgeon: Shireen Mayo MD;  [facility];  Service: Urology;;    INJECTION OF BOTULINUM TOXIN TYPE A  11/16/2021    Procedure: INJECTION, BOTULINUM TOXIN, 200units;  Surgeon: Shireen Mayo MD;  [facility];  Service: Urology;;    INJECTION OF BOTULINUM TOXIN TYPE A  7/19/2022    Procedure: INJECTION, BOTULINUM TOXIN, 300 units ;  Surgeon: Shireen Mayo MD;  [address]  Service: Urology;;    INSERTION, SUPRAPUBIC CATHETER N/A 12/13/2022    Procedure: INSERTION, SUPRAPUBIC CATHETER;  Surgeon: Shireen Mayo MD;  Location: [facility] OR [address] [place] [address];  Service: Urology;  Laterality: N/A;  exchange    pain pump placement      SQ Dilaudid Pump managed by Dr. Hillman, Pain Management    REMOVAL OF BONE SPUR OF FOOT Bilateral 9/16/2022    Procedure: EXCISION ARTHRITIC BONE, BILATERAL FOOT;  Surgeon: NICOLA Mcgrath;  Location: [facility];  Service: Podiatry;  Laterality: Bilateral;    REPLACEMENT OF CATHETER N/A 10/31/2019    Procedure: REPLACEMENT, CATHETER-SUPRAPUBIC;  Surgeon: Shireen Mayo MD;  Location: [address];  Service: Urology;  Laterality: N/A;    SPINAL CORD STIMULATOR REMOVAL      before [place]    SPINE SURGERY  5-13-13    CERVICAL FUSION    TONSILLECTOMY         Time Tracking:     OT Date of Treatment: 05/26/23  OT Start Time: 1147  OT Stop Time: 1230  OT Total Time (min): 43 min with PT    Billable Minutes:Evaluation 10  Therapeutic Activity 15    5/26/2023

## 2023-05-26 NOTE — PLAN OF CARE
Pts safety maintained, bed alarm on, call bell in reach. Meds given per MAR. Pain relieved with PRN Hydrocodone. Suprapubic catheter CDI, Wrap clean on BLE. Continues to have edema and redness. Educated about fluid intake, pt refusing to follow. No N/V, SOB, distress during night. Vitals stable.

## 2023-05-26 NOTE — NURSING
"Pt c/o of L side chest pain, describes as "18 varma on her chest" for her pain  VSS (see flow sheet 0976 and 1456)  Notified primary team  MD at bedside, new orders placed  Will continue to monitor    "

## 2023-05-26 NOTE — PLAN OF CARE
"  Problem: Physical Therapy  Goal: Physical Therapy Goal  Description: Goals to be met by: 2023     Patient will increase functional independence with mobility by performin. Supine to sit with Stand-by Assistance  2. Sit to supine with Contact Guard Assistance  3. Sit to stand transfer with Stand-by Assistance using Rolling Walker  4. Bed to chair transfer with Stand-by Assistance using Rolling Walker  5. Gait  x 50 feet with Stand-by Assistance using Rolling Walker.   6. Lower extremity exercise program x10 reps with supervision    Outcome: Ongoing, Progressing   Patient will benefit from cont PT services to maximize functional independence; will recommend moderate intensity therapy upon d/c; pt performed sup <> sit with modA, sit to stand with Virgil;  pt took sidesteps to HOB wth min/CGA and use of RW; stepping limited by increased pain BLEs with pt stating she has "blisters"; pt noted to have a small wound on R buttocks- nsg notified; will progress as tolerated.  "

## 2023-05-26 NOTE — ASSESSMENT & PLAN NOTE
Body mass index is 39.22 kg/m². Morbid obesity complicates all aspects of disease management from diagnostic modalities to treatment. Weight loss encouraged and health benefits explained to patient.

## 2023-05-27 PROBLEM — R07.9 CHEST PAIN: Status: RESOLVED | Noted: 2019-02-21 | Resolved: 2023-05-27

## 2023-05-27 LAB
ANION GAP SERPL CALC-SCNC: 8 MMOL/L (ref 8–16)
BASOPHILS # BLD AUTO: 0.01 K/UL (ref 0–0.2)
BASOPHILS NFR BLD: 0.2 % (ref 0–1.9)
BUN SERPL-MCNC: 8 MG/DL (ref 8–23)
CALCIUM SERPL-MCNC: 9.4 MG/DL (ref 8.7–10.5)
CHLORIDE SERPL-SCNC: 100 MMOL/L (ref 95–110)
CO2 SERPL-SCNC: 35 MMOL/L (ref 23–29)
CREAT SERPL-MCNC: 0.8 MG/DL (ref 0.5–1.4)
DIFFERENTIAL METHOD: ABNORMAL
EOSINOPHIL # BLD AUTO: 0.4 K/UL (ref 0–0.5)
EOSINOPHIL NFR BLD: 8.8 % (ref 0–8)
ERYTHROCYTE [DISTWIDTH] IN BLOOD BY AUTOMATED COUNT: 13.6 % (ref 11.5–14.5)
EST. GFR  (NO RACE VARIABLE): >60 ML/MIN/1.73 M^2
GLUCOSE SERPL-MCNC: 96 MG/DL (ref 70–110)
HCT VFR BLD AUTO: 29 % (ref 37–48.5)
HGB BLD-MCNC: 8.6 G/DL (ref 12–16)
IMM GRANULOCYTES # BLD AUTO: 0.01 K/UL (ref 0–0.04)
IMM GRANULOCYTES NFR BLD AUTO: 0.2 % (ref 0–0.5)
LYMPHOCYTES # BLD AUTO: 1.3 K/UL (ref 1–4.8)
LYMPHOCYTES NFR BLD: 31.2 % (ref 18–48)
MAGNESIUM SERPL-MCNC: 2.1 MG/DL (ref 1.6–2.6)
MCH RBC QN AUTO: 25.7 PG (ref 27–31)
MCHC RBC AUTO-ENTMCNC: 29.7 G/DL (ref 32–36)
MCV RBC AUTO: 87 FL (ref 82–98)
MONOCYTES # BLD AUTO: 0.5 K/UL (ref 0.3–1)
MONOCYTES NFR BLD: 11.7 % (ref 4–15)
NEUTROPHILS # BLD AUTO: 2 K/UL (ref 1.8–7.7)
NEUTROPHILS NFR BLD: 47.9 % (ref 38–73)
NRBC BLD-RTO: 0 /100 WBC
PLATELET # BLD AUTO: 244 K/UL (ref 150–450)
PMV BLD AUTO: 9.8 FL (ref 9.2–12.9)
POTASSIUM SERPL-SCNC: 4.3 MMOL/L (ref 3.5–5.1)
RBC # BLD AUTO: 3.35 M/UL (ref 4–5.4)
SODIUM SERPL-SCNC: 143 MMOL/L (ref 136–145)
WBC # BLD AUTO: 4.1 K/UL (ref 3.9–12.7)

## 2023-05-27 PROCEDURE — 63600175 PHARM REV CODE 636 W HCPCS: Performed by: NURSE PRACTITIONER

## 2023-05-27 PROCEDURE — 94761 N-INVAS EAR/PLS OXIMETRY MLT: CPT

## 2023-05-27 PROCEDURE — 94640 AIRWAY INHALATION TREATMENT: CPT

## 2023-05-27 PROCEDURE — 25000003 PHARM REV CODE 250: Performed by: HOSPITALIST

## 2023-05-27 PROCEDURE — G0378 HOSPITAL OBSERVATION PER HR: HCPCS

## 2023-05-27 PROCEDURE — 83735 ASSAY OF MAGNESIUM: CPT | Performed by: NURSE PRACTITIONER

## 2023-05-27 PROCEDURE — 94660 CPAP INITIATION&MGMT: CPT

## 2023-05-27 PROCEDURE — 96372 THER/PROPH/DIAG INJ SC/IM: CPT | Performed by: NURSE PRACTITIONER

## 2023-05-27 PROCEDURE — 96376 TX/PRO/DX INJ SAME DRUG ADON: CPT

## 2023-05-27 PROCEDURE — 36415 COLL VENOUS BLD VENIPUNCTURE: CPT | Performed by: NURSE PRACTITIONER

## 2023-05-27 PROCEDURE — 27000221 HC OXYGEN, UP TO 24 HOURS

## 2023-05-27 PROCEDURE — 25000003 PHARM REV CODE 250: Performed by: NURSE PRACTITIONER

## 2023-05-27 PROCEDURE — 63600175 PHARM REV CODE 636 W HCPCS: Performed by: HOSPITALIST

## 2023-05-27 PROCEDURE — 80048 BASIC METABOLIC PNL TOTAL CA: CPT | Performed by: NURSE PRACTITIONER

## 2023-05-27 PROCEDURE — 85025 COMPLETE CBC W/AUTO DIFF WBC: CPT | Performed by: NURSE PRACTITIONER

## 2023-05-27 PROCEDURE — 99900035 HC TECH TIME PER 15 MIN (STAT)

## 2023-05-27 PROCEDURE — 97530 THERAPEUTIC ACTIVITIES: CPT | Mod: CQ

## 2023-05-27 RX ORDER — HYDROMORPHONE HYDROCHLORIDE 1 MG/ML
0.5 INJECTION, SOLUTION INTRAMUSCULAR; INTRAVENOUS; SUBCUTANEOUS EVERY 6 HOURS PRN
Status: DISCONTINUED | OUTPATIENT
Start: 2023-05-27 | End: 2023-05-29

## 2023-05-27 RX ORDER — METOLAZONE 2.5 MG/1
5 TABLET ORAL ONCE
Status: COMPLETED | OUTPATIENT
Start: 2023-05-27 | End: 2023-05-27

## 2023-05-27 RX ADMIN — TIOTROPIUM BROMIDE INHALATION SPRAY 2 PUFF: 3.12 SPRAY, METERED RESPIRATORY (INHALATION) at 08:05

## 2023-05-27 RX ADMIN — ASPIRIN 81 MG: 81 TABLET, COATED ORAL at 09:05

## 2023-05-27 RX ADMIN — HYDROCORTISONE 5 MG: 5 TABLET ORAL at 09:05

## 2023-05-27 RX ADMIN — ENOXAPARIN SODIUM 40 MG: 40 INJECTION SUBCUTANEOUS at 05:05

## 2023-05-27 RX ADMIN — HYDROCODONE BITARTRATE AND ACETAMINOPHEN 1 TABLET: 10; 325 TABLET ORAL at 06:05

## 2023-05-27 RX ADMIN — SENNOSIDES 2 TABLET: 8.6 TABLET, FILM COATED ORAL at 09:05

## 2023-05-27 RX ADMIN — QUETIAPINE FUMARATE 200 MG: 100 TABLET ORAL at 09:05

## 2023-05-27 RX ADMIN — FERROUS SULFATE TAB 325 MG (65 MG ELEMENTAL FE) 1 EACH: 325 (65 FE) TAB at 09:05

## 2023-05-27 RX ADMIN — FLUOXETINE HYDROCHLORIDE 60 MG: 20 CAPSULE ORAL at 09:05

## 2023-05-27 RX ADMIN — METOLAZONE 5 MG: 2.5 TABLET ORAL at 09:05

## 2023-05-27 RX ADMIN — LEVOTHYROXINE SODIUM 150 MCG: 75 TABLET ORAL at 06:05

## 2023-05-27 RX ADMIN — OXYBUTYNIN CHLORIDE 10 MG: 5 TABLET, EXTENDED RELEASE ORAL at 09:05

## 2023-05-27 RX ADMIN — HYDROCORTISONE 10 MG: 5 TABLET ORAL at 06:05

## 2023-05-27 RX ADMIN — FUROSEMIDE 60 MG: 10 INJECTION, SOLUTION INTRAMUSCULAR; INTRAVENOUS at 09:05

## 2023-05-27 RX ADMIN — LIDOCAINE 1 PATCH: 50 PATCH CUTANEOUS at 09:05

## 2023-05-27 RX ADMIN — TRAZODONE HYDROCHLORIDE 300 MG: 100 TABLET ORAL at 09:05

## 2023-05-27 RX ADMIN — HYDROMORPHONE HYDROCHLORIDE 0.5 MG: 1 INJECTION, SOLUTION INTRAMUSCULAR; INTRAVENOUS; SUBCUTANEOUS at 02:05

## 2023-05-27 RX ADMIN — ATORVASTATIN CALCIUM 80 MG: 40 TABLET, FILM COATED ORAL at 09:05

## 2023-05-27 RX ADMIN — LIOTHYRONINE SODIUM 25 MCG: 25 TABLET ORAL at 09:05

## 2023-05-27 NOTE — PROGRESS NOTES
[facility] Medicine  Progress Note    Patient Name: Tasha Hawley  MRN: [id number]  Patient Class: OP- Observation   Admission Date: 5/24/2023  Length of Stay: 0 days  Attending Physician: Nic Mistry, *  Primary Care Provider: Mesfin Hodges Ii, MD        Subjective:     Principal Problem:Lymphedema        HPI:  Tasha Hawley is a 67 year old female w/ a significant PMH including chronic pain with long-term opioid use, recurrent UTIs, lymphedema, debility, chronic diastolic HF, neurogenic bladder w/ suprapubic catheter, depression, and anxiety. She was recently discharged after admission for pneumonia, for which she completed a course of PO abx. She presented to the ED with c/o chronic BLE pain that is worsening as well as increased BLE edema. She reports compliance with lasix. She has chronic lymphedema of BLE, however, her  reports it is much worse over the last 2 days and now with weeping. She also reported CP in the ED, however, denies that on my exam. She has chronic SOB and is on home O2 4L, she denies change in SOB. Patient lethargic but easily arousable on exam. Her , Dangelo, answered majority of history questions.           Overview/Hospital Course:  No notes on file    Interval History:  No further chest pain, significant pain and discomfort bilateral lower extremities.  Reports that urine output has decreased overdose.  States the leg pain is worse than normal and the lesions are weeping.    Review of Systems   Cardiovascular:  Positive for leg swelling. Negative for chest pain.   Objective:     Vital Signs (Most Recent):  Temp: 97.9 °F (36.6 °C) (05/27/23 0723)  Pulse: 65 (05/27/23 0826)  Resp: (!) 21 (05/27/23 0826)  BP: (!) 96/46 (05/27/23 0723)  SpO2: 98 % (05/27/23 0826) Vital Signs (24h Range):  Temp:  [97.2 °F (36.2 °C)-98.7 °F (37.1 °C)] 97.9 °F (36.6 °C)  Pulse:  [56-73] 65  Resp:  [16-21] 21  SpO2:  [95 %-100 %] 98 %  BP: ()/(46-56) 96/46      Weight: 105.3 kg (232 lb 2.3 oz)  Body mass index is 38.63 kg/m².    Intake/Output Summary (Last 24 hours) at 2023 1109  Last data filed at 2023 0959  Gross per 24 hour   Intake 2280 ml   Output 2125 ml   Net 155 ml           Physical Exam  Vitals and nursing note reviewed.   Constitutional:       General: She is not in acute distress.     Appearance: She is obese. She is ill-appearing.   HENT:      Head: Normocephalic and atraumatic.   Cardiovascular:      Rate and Rhythm: Normal rate and regular rhythm.      Pulses:           Dorsalis pedis pulses are 1+ on the right side and 1+ on the left side.   Musculoskeletal:      Right lower le+ Pitting Edema present.      Left lower le+ Pitting Edema present.   Skin:     General: Skin is warm.      Findings: Erythema present.      Comments: Bilateral lower extremities bandaged; see wound care photos   Neurological:      Mental Status: She is alert, oriented to person, place, and time and easily aroused.   Psychiatric:         Mood and Affect: Mood normal.         Behavior: Behavior normal.         Thought Content: Thought content normal.         Judgment: Judgment normal.           Significant Labs: All pertinent labs within the past 24 hours have been reviewed.    Significant Imaging: I have reviewed all pertinent imaging results/findings within the past 24 hours.      Assessment/Plan:      * Lymphedema  · Chronic but acutely worse with associated increase in pain  · IV Lasix 60 mg b.i.d.; add metolazone  · BLE ultrasound without clot  · MALLORIE hose ordered  · If no improvement from Lasix, may benefit from general surgery consult for recommendations  · Continue pain management with intrathecal pump and home pain medication regimen  · Hold p.r.n. pain medications or muscle relaxers for SBP <100 or increased lethargy        Intractable pain  · Continue pain management with intrathecal pump and home pain medication regimen  · Hold p.r.n. pain medications  or muscle relaxers for SBP <100 or increased lethargy      Severe obesity (BMI 35.0-39.9) with comorbidity  Body mass index is 38.63 kg/m². Morbid obesity complicates all aspects of disease management from diagnostic modalities to treatment. Weight loss encouraged and health benefits explained to patient.         Adrenal insufficiency  -continue home medications      Debility  · Will order PT and OT  · Expect will benefit from home health      Leg pain, bilateral  · See above for plan      Chronic diastolic heart failure        Suprapubic catheter  · Chronic   · Neurogenic bladder      Hypothyroidism (acquired)  -continue home medications      Narcotic dependency, continuous  -see above      Sleep apnea  · Continue home CPAP        VTE Risk Mitigation (From admission, onward)         Ordered     enoxaparin injection 40 mg  Daily         05/24/23 2034     IP VTE HIGH RISK PATIENT  Once         05/24/23 2034                Discharge Planning   JACKIE:      Code Status: Full Code   Is the patient medically ready for discharge?:     Reason for patient still in hospital (select all that apply): Patient trending condition and Treatment  Discharge Plan A: Home with family, Home Health                  Nic Mistry MD  Department of Hospital Medicine   [facility]

## 2023-05-27 NOTE — CONSULTS
[facility] - Med Surg  Adult Nutrition  Consult Note    SUMMARY     Recommendations  1) Rec'd Nadeem BID to promote wound healing   2) vitamin/mineral supplement - B12, vitamin D, Fe. Consider MVI.   3) Per fluid caution, if patient PO < 50%, consider Boost Plus BID to optimize nutrition   4) Monitor weight, I/Os, labs   5) RD to follow to monitor nutrition status    Goals: Patient will meet 50-75% EEN/EPN by RD follow up  Nutrition Goal Status: new  Communication of RD Recs:  (POC)    Assessment and Plan  Nutrition Problem  Inadequate vitamin/mineral intake    Related to (etiology):   Wound healing    Signs and Symptoms (as evidenced by):   Altered nutrition related labs - Vitamin B12 def, vitamin D def, anemia, non healing wounds    Interventions:  Vitamin/mineral supplement  Modular/commercial beverage  Mineral/fat modified diet  Collaboration with other providers    Nutrition Diagnosis Status:   New         Malnutrition Assessment       MADELIN NFPE RD remote                                Reason for Assessment    Reason For Assessment: consult (wound healing)  Diagnosis:  (lymphedema)  Relevant Medical History:   Patient Active Problem List   Diagnosis    Generalized anxiety disorder    Sleep apnea    Iron deficiency anemia    Major depressive disorder, recurrent, mild    Chronic pain syndrome    Cervical myelopathy    SOB (shortness of breath)    Narcotic dependency, continuous    Thoracic aorta atherosclerosis    Drug-induced constipation    GERD (gastroesophageal reflux disease)    Coronary artery disease of native artery with stable angina pectoris    Neurogenic bladder    Frequent falls    Hypothyroidism (acquired)    Lymphedema    Scoliosis deformity of spine    S/P insertion of spinal cord stimulator    S/P insertion of intrathecal pump    Paresthesia of both lower extremities    Degenerative disc disease, lumbar    Osteoarthritis of spine with radiculopathy, lumbar region    Bradycardia    Bilateral carotid  "artery disease    Insomnia due to medical condition    Suprapubic catheter    Esophageal dysphagia    Recurrent UTI    Mixed stress and urge urinary incontinence    Inflammatory spondylopathy of lumbar region    Pure hypercholesterolemia    Chronic diastolic heart failure    Partial small bowel obstruction    Constipation due to opioid therapy    Leg pain, bilateral    Hypotension    Debility    History of stroke with residual deficit    Tremor    UTI (urinary tract infection)    Anxiety    Calcaneal spur of left foot    Charcot ankle, unspecified laterality    Adrenal insufficiency    Acute respiratory failure with hypoxia and hypercarbia    Palliative care encounter    Multifocal pneumonia    Severe obesity (BMI 35.0-39.9) with comorbidity    Other emphysema    Stage 3a chronic kidney disease    Intractable pain     Interdisciplinary Rounds: did not attend  General Information Comments: RD consulted for wound healing to BLE, noted 4+ edema. Receiving cardiac diet, 1800 mL fluid restriction. Fair appetite, 50% intake at this time. Pt reported to live at home with spouse who assist with ADLs. Pt able to ambulate with walker. No GI intolerane noted at this time. Bennett 15- buttocks, BLE edema. Suprapubic cath in place. MADELIN NFPE RD remote. On-site RD to follow up.  Nutrition Discharge Planning: dc on heart healthy diet with fluid restriction per MD    Nutrition Risk Screen    Nutrition Risk Screen: large or nonhealing wound, burn or pressure injury    Nutrition/Diet History    Spiritual, Cultural Beliefs, Druze Practices, Values that Affect Care: no  Factors Affecting Nutritional Intake: None identified at this time    Anthropometrics    Temp: 97.6 °F (36.4 °C)  Height Method: Stated  Height: 5' 5" (165.1 cm)  Height (inches): 65 in  Weight Method: Bed Scale  Weight: 105.3 kg (232 lb 2.3 oz)  Weight (lb): 232.15 lb  Ideal Body Weight (IBW), Female: 125 lb  % Ideal Body Weight, Female (lb): 185.72 %  BMI " (Calculated): 38.6  BMI Grade: 35 - 39.9 - obesity - grade II       Lab/Procedures/Meds    Pertinent Labs Reviewed: reviewed  CBC:  Recent Labs   Lab 05/27/23  0635   WBC 4.10   HGB 8.6*   HCT 29.0*        CMP:  Recent Labs   Lab 05/27/23  0635   CALCIUM 9.4      K 4.3   CO2 35*      BUN 8   CREATININE 0.8     Pertinent Medications Reviewed: reviewed  Scheduled Meds:   aspirin  81 mg Oral Daily    atorvastatin  80 mg Oral Daily    docusate sodium  200 mg Oral QHS    enoxparin  40 mg Subcutaneous Daily    ferrous sulfate  1 tablet Oral Daily    FLUoxetine  60 mg Oral Daily    furosemide (LASIX) injection  60 mg Intravenous Q12H    hydrocortisone  10 mg Oral QAM    hydrocortisone  5 mg Oral QHS    levothyroxine  150 mcg Oral Before breakfast    LIDOcaine  1 patch Transdermal Daily    liothyronine  25 mcg Oral Daily    oxybutynin  10 mg Oral Daily    QUEtiapine  200 mg Oral Nightly    senna  2 tablet Oral BID    tiotropium bromide  2 puff Inhalation Daily    traZODone  300 mg Oral QHS     Continuous Infusions:   intrathecal pain pump compound       PRN Meds:.albuterol-ipratropium, dextrose 10%, dextrose 10%, dextrose, dextrose, glucagon (human recombinant), HYDROcodone-acetaminophen, HYDROmorphone, hydrOXYzine, melatonin, naloxone, nitroGLYCERIN, ondansetron, ondansetron, sodium chloride 0.9%, tiZANidine    Estimated/Assessed Needs    Weight Used For Calorie Calculations: 105.3 kg (232 lb 2.3 oz)  Energy Calorie Requirements (kcal): 6180-5212  Energy Need Method: [place]-St Jeor (x 1.2 - 1.3)  Protein Requirements:  (0.8-1.0 g/kg)  Weight Used For Protein Calculations: 105.3 kg (232 lb 2.3 oz)  Fluid Requirements (mL): 1 mL/kcal  Estimated Fluid Requirement Method:  (or per MD)  RDA Method (mL): 1911  CHO Requirement: 240g      Nutrition Prescription Ordered    Current Diet Order: Cardiac - 1800 mL fluid    Evaluation of Received Nutrient/Fluid Intake    I/O: - 6.5 L since admit  Energy  Calories Required: not meeting needs  Protein Required: not meeting needs  Fluid Required: not meeting needs  Comments: LBM 5/27  Tolerance:  (monitoring)  % Intake of Estimated Energy Needs: 25 - 50 %  % Meal Intake: 25 - 50 %  Intake/Output - Last 3 Shifts         05/25 0700 05/26 0659 05/26 0700 05/27 0659 05/27 0700 05/28 0659    P.O. 1100 1680 600    Total Intake(mL/kg) 1100 (10.3) 1680 (16) 600 (5.7)    Urine (mL/kg/hr) 3675 (1.4) 2125 (0.8)     Total Output 3675 2125     Net -2575 -445 +600           Stool Occurrence   2 x            Nutrition Risk    Level of Risk/Frequency of Follow-up:  (1-2 x/week)       Monitor and Evaluation    Food and Nutrient Intake: energy intake, food and beverage intake  Food and Nutrient Adminstration: diet order  Physical Activity and Function: factors affecting access to physical activity, nutrition-related ADLs and IADLs  Anthropometric Measurements: weight, weight change, body mass index  Biochemical Data, Medical Tests and Procedures: lipid profile, inflammatory profile, glucose/endocrine profile, gastrointestinal profile, electrolyte and renal panel  Nutrition-Focused Physical Findings: overall appearance       Nutrition Follow-Up    RD Follow-up?: Yes

## 2023-05-27 NOTE — PLAN OF CARE
Pt. AAOx4. C/O pain, PRN administered within MD orders for BP. Pt. Up to bedside commode. Suprapubic catheter in place, output charted, catheter care completed. I&O documented. Intake fluid sheet placed on door due to Pt. Non-compliance with fluid restriction. Bilateral lower extremity wound care completed. Tele monitored. Bed alarm on and call light in reach. Pt. Instructed to call for assistance.   Problem: Adult Inpatient Plan of Care  Goal: Plan of Care Review  Outcome: Ongoing, Progressing     Problem: Adult Inpatient Plan of Care  Goal: Patient-Specific Goal (Individualized)  Outcome: Ongoing, Progressing     Problem: Adult Inpatient Plan of Care  Goal: Absence of Hospital-Acquired Illness or Injury  Outcome: Ongoing, Progressing     Problem: Adult Inpatient Plan of Care  Goal: Optimal Comfort and Wellbeing  Outcome: Ongoing, Progressing

## 2023-05-27 NOTE — PT/OT/SLP PROGRESS
Physical Therapy Treatment    Patient Name:  Tasha Hawley   MRN:  [id number]    Recommendations:     Discharge Recommendations: other (see comments) (moderate intensity therapy.)  Discharge Equipment Recommendations: other (see comments) (Defer to next level of care.)  Barriers to discharge:  Pt requires increased level of assistance to perform functional mobility tasks.    Assessment:     Tasha Hawley is a 67 y.o. female admitted with a medical diagnosis of Lymphedema.  She presents with the following impairments/functional limitations: weakness, impaired endurance, impaired functional mobility, gait instability, decreased lower extremity function, decreased coordination, impaired cardiopulmonary response to activity.    Rehab Prognosis: Good; patient would benefit from acute skilled PT services to address these deficits and reach maximum level of function.    Recent Surgery: * No surgery found *      Plan:     During this hospitalization, patient to be seen 3 x/week to address the identified rehab impairments via gait training, therapeutic activities, therapeutic exercises, neuromuscular re-education and progress toward the following goals:    Plan of Care Expires:  06/26/23    Subjective     Chief Complaint: Pt with no complaints or concerns at this time.   Patient/Family Comments/goals: Pt agreeable to PT treatment.   Pain/Comfort:  Pain Rating 1: 0/10      Objective:     Patient found HOB elevated with meadows catheter, telemetry, peripheral IV upon PT entry to room.     General Precautions: Standard, fall  Orthopedic Precautions: N/A  Braces: N/A       Functional Mobility:  Bed Mobility:     Supine to Sit: contact guard assistance  Sit to Supine: minimum assistance  Transfers:     Sit to Stand:  contact guard assistance with rolling walker  Gait: Pt able to perform lateral stepping along EOB with RW, SBA requiring verbal as well as tactile cueing on proper movement sequencing with AD and LE's.     Balance: Pt with good sitting and fair standing balance.       AM-PAC 6 CLICK MOBILITY  Turning over in bed (including adjusting bedclothes, sheets and blankets)?: 2  Sitting down on and standing up from a chair with arms (e.g., wheelchair, bedside commode, etc.): 3  Moving from lying on back to sitting on the side of the bed?: 3  Moving to and from a bed to a chair (including a wheelchair)?: 3  Need to walk in hospital room?: 3  Climbing 3-5 steps with a railing?: 1  Basic Mobility Total Score: 15       Treatment & Education:  Pt performed B LE exercises seated x 30 reps:  LAQ's, ankle pumps, and seated hip flexion.      Patient left HOB elevated with all lines intact and call button in reach..    GOALS:   Multidisciplinary Problems       Physical Therapy Goals          Problem: Physical Therapy    Goal Priority Disciplines Outcome Goal Variances Interventions   Physical Therapy Goal     PT, PT/OT Ongoing, Progressing     Description: Goals to be met by: 2023     Patient will increase functional independence with mobility by performin. Supine to sit with Stand-by Assistance  2. Sit to supine with Contact Guard Assistance  3. Sit to stand transfer with Stand-by Assistance using Rolling Walker  4. Bed to chair transfer with Stand-by Assistance using Rolling Walker  5. Gait  x 50 feet with Stand-by Assistance using Rolling Walker.   6. Lower extremity exercise program x10 reps with supervision                         Time Tracking:     PT Received On: 23  PT Start Time: 1025     PT Stop Time: 1043  PT Total Time (min): 18 min     Billable Minutes: Therapeutic Activity 18    Treatment Type: Treatment  PT/PTA: PTA     Number of PTA visits since last PT visit: 2023

## 2023-05-27 NOTE — NURSING
Pt. Requesting PRN pain medication. Dr. Mistry notified of Pt. Vitals and decreased BP. MD stated OK to administer PRN dilaudid to Pt. Care continued.

## 2023-05-27 NOTE — ASSESSMENT & PLAN NOTE
· Chronic but acutely worse with associated increase in pain  · IV Lasix 60 mg b.i.d.; add metolazone  · BLE ultrasound without clot  · MALLORIE hose ordered  · If no improvement from Lasix, may benefit from general surgery consult for recommendations  · Continue pain management with intrathecal pump and home pain medication regimen  · Hold p.r.n. pain medications or muscle relaxers for SBP <100 or increased lethargy

## 2023-05-27 NOTE — PT/OT/SLP EVAL
"Physical Therapy Evaluation    Patient Name:  Tasha Hawley   MRN:  [id number]    Recommendations:     Discharge Recommendations:  (moderate intensity therapy)   Discharge Equipment Recommendations:  (defer to next level of care)   Barriers to discharge:  pt requires increased level of assistance    Assessment:     Tasha Hawley is a 67 y.o. female admitted with a medical diagnosis of Lymphedema.  She presents with the following impairments/functional limitations: weakness, impaired endurance, impaired self care skills, impaired functional mobility, gait instability, impaired balance, decreased coordination, impaired cognition, decreased safety awareness, decreased lower extremity function, decreased upper extremity function, pain, decreased ROM, impaired skin, edema, impaired joint extensibility Patient will benefit from cont PT services to maximize functional independence; will recommend moderate intensity therapy upon d/c; pt performed sup <> sit with modA, sit to stand with Virgil;  pt took sidesteps to HOB wth min/CGA and use of RW; stepping limited by increased pain BLEs with pt stating she has "blisters"; pt noted to have a small wound on R buttocks- nsg notified; will progress as tolerated..    Rehab Prognosis: Fair; patient would benefit from acute skilled PT services to address these deficits and reach maximum level of function.    Recent Surgery: * No surgery found *      Plan:     During this hospitalization, patient to be seen 3x/week to address the identified rehab impairments via gait training, therapeutic activities, therapeutic exercises, neuromuscular re-education and progress toward the following goals:    Plan of Care Expires:  06/26/23    Subjective     Chief Complaint: Pain BLE  Patient/Family Comments/goals: pt likes sitting EOB  Pain/Comfort:  Pain Rating 1: 10/10  Location - Orientation 1: lower  Location 1: back (& L hip)  Pain Addressed 1: Reposition, Cessation of Activity, " "Distraction, Nurse notified  Pain Rating Post-Intervention 1: other (see comments) (not rated)    Patients cultural, spiritual, Alevism conflicts given the current situation: no    Living Environment:  Pt lives w/spouse, SSH, ramp, tub/shower combo with TTB  Previous level of function: Pt reports taking only a few steps with RW, using WC for mobility, spouse assists with ADLs  Equipment Used at Home: bedside commode, bath bench, walker, rolling, wheelchair  Assistance upon Discharge: Spouse    Objective:     Communicated with nurse prior to session.  Patient found HOB elevated with bed alarm, PureWick, telemetry, peripheral IV  upon PT entry to room.    General Precautions: Standard, fall  Orthopedic Precautions:N/A   Braces: N/A  Respiratory Status: Nasal cannula, flow 4 L/min    Exams:  Cognitive Exam:   Per, pt stating she is on a cruise ship, confused to month & year, oriented to reason   Gross Motor Coordination:  slow movement 2/2 edema and weakness  Postural Exam:  Patient presented with the following abnormalities:    -       Rounded shoulders  -       Forward head  -       Forward trunk flexion  Skin Integrity/Edema:      -       Skin integrity:  B legs with bandage and aced ; small wound R buttocks- informed nurse  -       Edema: Moderate to severe BLEs  RLE ROM: WFL of body habitus  RLE Strength: hip fl~3-, hip ext~3+, knee ext ~3+ to 4-  LLE ROM: WFL of body habitus  LLE Strength: hip fl~3-, hip ext~3+, knee ext ~3+ to 4-    Functional Mobility:  Bed Mobility:     Supine to Sit: moderate assistance  Sit to Supine: moderate assistance  Transfers:     Sit to Stand:  minimum assistance with rolling walker  Gait: Patient able to side step 3-4 steps with Virgil and use of RW; seated rest break between 2/2 c/o B foot pain 2/2 "blisters"; pt amb with forward trunk flexion, decreased step length and decreased foot clearance  Balance: sit~fair+, stand~fair, dynamic~fair-      AM-PAC 6 CLICK MOBILITY  Total " "Score:  12      Treatment & Education:  Pt educated on role of PT/POC; pt performed skills as described above; pt limited with side stepping 2/2 increased pain BLEs and reporting that she has "blisters" under her feet; pt required modA for sup<>sit with VCs for technique; Virgil/CGA with sit<>stand and steptaking with RW- VCs for posture and increasing step length; returned to supine following and educated in AROM as able to be done throughout the day.    Patient left HOB elevated with all lines intact, call button in reach, bed alarm on, and nurse notified.    GOALS:   Multidisciplinary Problems       Physical Therapy Goals          Problem: Physical Therapy    Goal Priority Disciplines Outcome Goal Variances Interventions   Physical Therapy Goal     PT, PT/OT Ongoing, Progressing     Description: Goals to be met by: 2023     Patient will increase functional independence with mobility by performin. Supine to sit with Stand-by Assistance  2. Sit to supine with Contact Guard Assistance  3. Sit to stand transfer with Stand-by Assistance using Rolling Walker  4. Bed to chair transfer with Stand-by Assistance using Rolling Walker  5. Gait  x 50 feet with Stand-by Assistance using Rolling Walker.   6. Lower extremity exercise program x10 reps with supervision                         History:     Past Medical History:   Diagnosis Date    Anticoagulant long-term use     Anxiety     Arthritis     Bilateral lower extremity edema     severe chronic    Carotid artery occlusion     Cataract     CHF (congestive heart failure)     Coronary artery disease     subtotalled LAD with collateral    Depression     Fever blister     Hard of hearing     Hypokalemia 2023    Hyponatremia 2022    Hypothyroid     Iron deficiency anemia     Lumbar radiculopathy     with chronic pain    Ocular migraine     Other emphysema 2023    Renal disorder     Sleep apnea     cpap       Past Surgical History:   Procedure Laterality " Date    ADENOIDECTOMY      BACK SURGERY      x 12    CARDIAC CATHETERIZATION  2016    subtotalled LAD with right to left collaterals    CATARACT EXTRACTION W/  INTRAOCULAR LENS IMPLANT Left     Dr Coleman     CYSTOSCOPIC LITHOLAPAXY N/A 6/27/2019    Procedure: CYSTOLITHOLAPAXY;  Surgeon: Shireen Mayo MD;  Location: [facility] OR 2ND FLR;  Service: Urology;  Laterality: N/A;    CYSTOSCOPIC LITHOLAPAXY N/A 9/3/2019    Procedure: CYSTOLITHOLAPAXY;  Surgeon: Shireen Mayo MD;  Location: [facility] OR 2ND FLR;  Service: Urology;  Laterality: N/A;    CYSTOSCOPY N/A 7/13/2021    Procedure: CYSTOSCOPY;  Surgeon: Shireen Mayo MD;  Location: [facility] OR 1ST FLR;  Service: Urology;  Laterality: N/A;    CYSTOSCOPY  11/16/2021    Procedure: CYSTOSCOPY;  Surgeon: Shireen Mayo MD;  [facility];  Service: Urology;;    CYSTOSCOPY  7/19/2022    Procedure: CYSTOSCOPY;  Surgeon: Shireen Mayo MD;  [facility];  Service: Urology;;    CYSTOSCOPY WITH INJECTION OF PERIURETHRAL BULKING AGENT  7/19/2022    Procedure: CYSTOSCOPY, WITH PERIURETHRAL BULKING AGENT INJECTION-MACROPLASTIQUE;  Surgeon: Shireen Mayo MD;  [facility];  Service: Urology;;    CYSTOSCOPY WITH INJECTION OF PERIURETHRAL BULKING AGENT N/A 3/28/2023    Procedure: CYSTOSCOPY, WITH PERIURETHRAL BULKING AGENT INJECTION;  Surgeon: Shireen Mayo MD;  [facility];  Service: Urology;  Laterality: N/A;  [place]    CYSTOSCOPY,WITH BOTULINUM TOXIN INJECTION N/A 12/13/2022    Procedure: CYSTOSCOPY,WITH BOTULINUM TOXIN INJECTION;  Surgeon: Shireen Mayo MD;  [facility];  Service: Urology;  Laterality: N/A;  300 U    CYSTOSCOPY,WITH BOTULINUM TOXIN INJECTION N/A 3/28/2023    Procedure: CYSTOSCOPY,WITH BOTULINUM TOXIN INJECTION;  Surgeon: Shireen Mayo MD;  [facility];  Service: Urology;  Laterality: N/A;  45 MIN.    300 UNITS    ESOPHAGOGASTRODUODENOSCOPY N/A 5/23/2018     Procedure: ESOPHAGOGASTRODUODENOSCOPY (EGD);  Surgeon: Prince Vance MD;  Location: [facility] (4TH FLR);  Service: Endoscopy;  Laterality: N/A;  r/s 'd per Dr. Vance due to family emergency- ER    HYSTERECTOMY  1975    endometriosis    INJECTION OF BOTULINUM TOXIN TYPE A  7/13/2021    Procedure: INJECTION, BOTULINUM TOXIN, 200units;  Surgeon: Shireen Mayo MD;  [facility];  Service: Urology;;    INJECTION OF BOTULINUM TOXIN TYPE A  11/16/2021    Procedure: INJECTION, BOTULINUM TOXIN, 200units;  Surgeon: Shireen Mayo MD;  [facility];  Service: Urology;;    INJECTION OF BOTULINUM TOXIN TYPE A  7/19/2022    Procedure: INJECTION, BOTULINUM TOXIN, 300 units ;  Surgeon: Shireen Mayo MD;  [facility];  Service: Urology;;    INSERTION, SUPRAPUBIC CATHETER N/A 12/13/2022    Procedure: INSERTION, SUPRAPUBIC CATHETER;  Surgeon: Shireen Mayo MD;  [facility];  Service: Urology;  Laterality: N/A;  exchange    pain pump placement      SQ Dilaudid Pump managed by Dr. Hillman, Pain Management    REMOVAL OF BONE SPUR OF FOOT Bilateral 9/16/2022    Procedure: EXCISION ARTHRITIC BONE, BILATERAL FOOT;  Surgeon: Adam Mcguire [facility];  Location: [facility];  Service: Podiatry;  Laterality: Bilateral;    REPLACEMENT OF CATHETER N/A 10/31/2019    Procedure: REPLACEMENT, CATHETER-SUPRAPUBIC;  Surgeon: Shireen Mayo MD;  Location: [facility] OR [address] [place] [address];  Service: Urology;  Laterality: N/A;    SPINAL CORD STIMULATOR REMOVAL      before [place]    SPINE SURGERY  5-13-13    CERVICAL FUSION    TONSILLECTOMY         Time Tracking:     PT Received On: 05/27/23  PT Start Time: 1147     PT Stop Time: 1230  PT Total Time (min): 43 min with OT    Billable Minutes: Evaluation 10 and Therapeutic Activity 18      05/26/2023

## 2023-05-27 NOTE — SUBJECTIVE & OBJECTIVE
Interval History:  No further chest pain, significant pain and discomfort bilateral lower extremities.  Reports that urine output has decreased overdose.  States the leg pain is worse than normal and the lesions are weeping.    Review of Systems   Cardiovascular:  Positive for leg swelling. Negative for chest pain.   Objective:     Vital Signs (Most Recent):  Temp: 97.9 °F (36.6 °C) (23)  Pulse: 65 (23)  Resp: (!) 21 (23)  BP: (!) 96/46 (23)  SpO2: 98 % (23) Vital Signs (24h Range):  Temp:  [97.2 °F (36.2 °C)-98.7 °F (37.1 °C)] 97.9 °F (36.6 °C)  Pulse:  [56-73] 65  Resp:  [16-21] 21  SpO2:  [95 %-100 %] 98 %  BP: ()/(46-56) 96/46     Weight: 105.3 kg (232 lb 2.3 oz)  Body mass index is 38.63 kg/m².    Intake/Output Summary (Last 24 hours) at 2023 1109  Last data filed at 2023 0959  Gross per 24 hour   Intake 2280 ml   Output 2125 ml   Net 155 ml           Physical Exam  Vitals and nursing note reviewed.   Constitutional:       General: She is not in acute distress.     Appearance: She is obese. She is ill-appearing.   HENT:      Head: Normocephalic and atraumatic.   Cardiovascular:      Rate and Rhythm: Normal rate and regular rhythm.      Pulses:           Dorsalis pedis pulses are 1+ on the right side and 1+ on the left side.   Musculoskeletal:      Right lower le+ Pitting Edema present.      Left lower le+ Pitting Edema present.   Skin:     General: Skin is warm.      Findings: Erythema present.      Comments: Bilateral lower extremities bandaged; see wound care photos   Neurological:      Mental Status: She is alert, oriented to person, place, and time and easily aroused.   Psychiatric:         Mood and Affect: Mood normal.         Behavior: Behavior normal.         Thought Content: Thought content normal.         Judgment: Judgment normal.           Significant Labs: All pertinent labs within the past 24 hours have been  reviewed.    Significant Imaging: I have reviewed all pertinent imaging results/findings within the past 24 hours.

## 2023-05-27 NOTE — PLAN OF CARE
Recommendations  1) Rec'd Nadeem BID to promote wound healing   2) vitamin/mineral supplement - B12, vitamin D, Fe. Consider MVI.   3) Per fluid caution, if patient PO < 50%, consider Boost Plus BID to optimize nutrition   4) Monitor weight, I/Os, labs   5) RD to follow to monitor nutrition status     Goals: Patient will meet 50-75% EEN/EPN by RD follow up  Nutrition Goal Status: new  Communication of RD Recs:  (POC)

## 2023-05-27 NOTE — ASSESSMENT & PLAN NOTE
Body mass index is 38.63 kg/m². Morbid obesity complicates all aspects of disease management from diagnostic modalities to treatment. Weight loss encouraged and health benefits explained to patient.

## 2023-05-27 NOTE — NURSING
Dr. Mistry notified of Pt. AM vitals prior to scheduled medication administration. MD stated OK to give Pt. IV lasix along with other scheduled medications. Care continued.

## 2023-05-28 DIAGNOSIS — D50.8 OTHER IRON DEFICIENCY ANEMIA: Primary | ICD-10-CM

## 2023-05-28 DIAGNOSIS — D50.9 IRON DEFICIENCY ANEMIA, UNSPECIFIED IRON DEFICIENCY ANEMIA TYPE: Primary | ICD-10-CM

## 2023-05-28 LAB
ANION GAP SERPL CALC-SCNC: 7 MMOL/L (ref 8–16)
BUN SERPL-MCNC: 9 MG/DL (ref 8–23)
CALCIUM SERPL-MCNC: 9.5 MG/DL (ref 8.7–10.5)
CHLORIDE SERPL-SCNC: 93 MMOL/L (ref 95–110)
CO2 SERPL-SCNC: 40 MMOL/L (ref 23–29)
CREAT SERPL-MCNC: 0.8 MG/DL (ref 0.5–1.4)
EST. GFR  (NO RACE VARIABLE): >60 ML/MIN/1.73 M^2
GLUCOSE SERPL-MCNC: 100 MG/DL (ref 70–110)
MAGNESIUM SERPL-MCNC: 1.9 MG/DL (ref 1.6–2.6)
POTASSIUM SERPL-SCNC: 3.9 MMOL/L (ref 3.5–5.1)
SODIUM SERPL-SCNC: 140 MMOL/L (ref 136–145)

## 2023-05-28 PROCEDURE — 80048 BASIC METABOLIC PNL TOTAL CA: CPT | Performed by: HOSPITALIST

## 2023-05-28 PROCEDURE — 25000003 PHARM REV CODE 250: Performed by: HOSPITALIST

## 2023-05-28 PROCEDURE — 96376 TX/PRO/DX INJ SAME DRUG ADON: CPT

## 2023-05-28 PROCEDURE — 27000221 HC OXYGEN, UP TO 24 HOURS

## 2023-05-28 PROCEDURE — 94761 N-INVAS EAR/PLS OXIMETRY MLT: CPT

## 2023-05-28 PROCEDURE — 25000003 PHARM REV CODE 250: Performed by: NURSE PRACTITIONER

## 2023-05-28 PROCEDURE — 96372 THER/PROPH/DIAG INJ SC/IM: CPT | Performed by: NURSE PRACTITIONER

## 2023-05-28 PROCEDURE — 63600175 PHARM REV CODE 636 W HCPCS: Performed by: NURSE PRACTITIONER

## 2023-05-28 PROCEDURE — 36415 COLL VENOUS BLD VENIPUNCTURE: CPT | Performed by: HOSPITALIST

## 2023-05-28 PROCEDURE — 94640 AIRWAY INHALATION TREATMENT: CPT

## 2023-05-28 PROCEDURE — 99900035 HC TECH TIME PER 15 MIN (STAT)

## 2023-05-28 PROCEDURE — 94799 UNLISTED PULMONARY SVC/PX: CPT

## 2023-05-28 PROCEDURE — G0378 HOSPITAL OBSERVATION PER HR: HCPCS

## 2023-05-28 PROCEDURE — 83735 ASSAY OF MAGNESIUM: CPT | Performed by: HOSPITALIST

## 2023-05-28 PROCEDURE — 63600175 PHARM REV CODE 636 W HCPCS: Performed by: HOSPITALIST

## 2023-05-28 PROCEDURE — 94660 CPAP INITIATION&MGMT: CPT

## 2023-05-28 RX ORDER — SODIUM CHLORIDE 0.9 % (FLUSH) 0.9 %
10 SYRINGE (ML) INJECTION
Status: CANCELLED | OUTPATIENT
Start: 2023-08-16

## 2023-05-28 RX ORDER — FERROUS GLUCONATE 324(38)MG
324 TABLET ORAL
Qty: 90 TABLET | Refills: 1 | Status: SHIPPED | OUTPATIENT
Start: 2023-05-28 | End: 2023-11-27

## 2023-05-28 RX ORDER — SODIUM CHLORIDE 0.9 % (FLUSH) 0.9 %
10 SYRINGE (ML) INJECTION
Status: CANCELLED | OUTPATIENT
Start: 2023-05-31

## 2023-05-28 RX ORDER — HEPARIN 100 UNIT/ML
500 SYRINGE INTRAVENOUS
Status: CANCELLED | OUTPATIENT
Start: 2023-05-31

## 2023-05-28 RX ORDER — SODIUM CHLORIDE 0.9 % (FLUSH) 0.9 %
10 SYRINGE (ML) INJECTION
Status: CANCELLED | OUTPATIENT
Start: 2023-07-06

## 2023-05-28 RX ORDER — HEPARIN 100 UNIT/ML
500 SYRINGE INTRAVENOUS
Status: CANCELLED | OUTPATIENT
Start: 2023-07-06

## 2023-05-28 RX ORDER — HEPARIN 100 UNIT/ML
500 SYRINGE INTRAVENOUS
Status: CANCELLED | OUTPATIENT
Start: 2023-08-09

## 2023-05-28 RX ORDER — SODIUM CHLORIDE 0.9 % (FLUSH) 0.9 %
10 SYRINGE (ML) INJECTION
Status: CANCELLED | OUTPATIENT
Start: 2023-08-09

## 2023-05-28 RX ORDER — ACETAZOLAMIDE 250 MG/1
250 TABLET ORAL 3 TIMES DAILY
Status: DISCONTINUED | OUTPATIENT
Start: 2023-05-28 | End: 2023-05-29

## 2023-05-28 RX ORDER — HEPARIN 100 UNIT/ML
500 SYRINGE INTRAVENOUS
Status: CANCELLED | OUTPATIENT
Start: 2023-08-16

## 2023-05-28 RX ORDER — FAMOTIDINE 20 MG/1
20 TABLET, FILM COATED ORAL 2 TIMES DAILY
Status: DISCONTINUED | OUTPATIENT
Start: 2023-05-28 | End: 2023-05-30 | Stop reason: HOSPADM

## 2023-05-28 RX ADMIN — ACETAZOLAMIDE 250 MG: 250 TABLET ORAL at 09:05

## 2023-05-28 RX ADMIN — ACETAZOLAMIDE 250 MG: 250 TABLET ORAL at 02:05

## 2023-05-28 RX ADMIN — HYDROCODONE BITARTRATE AND ACETAMINOPHEN 1 TABLET: 10; 325 TABLET ORAL at 11:05

## 2023-05-28 RX ADMIN — SENNOSIDES 2 TABLET: 8.6 TABLET, FILM COATED ORAL at 09:05

## 2023-05-28 RX ADMIN — HYDROCORTISONE 10 MG: 5 TABLET ORAL at 08:05

## 2023-05-28 RX ADMIN — LIOTHYRONINE SODIUM 25 MCG: 25 TABLET ORAL at 08:05

## 2023-05-28 RX ADMIN — TRAZODONE HYDROCHLORIDE 300 MG: 100 TABLET ORAL at 09:05

## 2023-05-28 RX ADMIN — ATORVASTATIN CALCIUM 80 MG: 40 TABLET, FILM COATED ORAL at 08:05

## 2023-05-28 RX ADMIN — FAMOTIDINE 20 MG: 20 TABLET, FILM COATED ORAL at 02:05

## 2023-05-28 RX ADMIN — SENNOSIDES 2 TABLET: 8.6 TABLET, FILM COATED ORAL at 08:05

## 2023-05-28 RX ADMIN — FERROUS SULFATE TAB 325 MG (65 MG ELEMENTAL FE) 1 EACH: 325 (65 FE) TAB at 08:05

## 2023-05-28 RX ADMIN — ASPIRIN 81 MG: 81 TABLET, COATED ORAL at 08:05

## 2023-05-28 RX ADMIN — HYDROCODONE BITARTRATE AND ACETAMINOPHEN 1 TABLET: 10; 325 TABLET ORAL at 09:05

## 2023-05-28 RX ADMIN — LIDOCAINE 1 PATCH: 50 PATCH CUTANEOUS at 08:05

## 2023-05-28 RX ADMIN — LEVOTHYROXINE SODIUM 150 MCG: 75 TABLET ORAL at 05:05

## 2023-05-28 RX ADMIN — TIOTROPIUM BROMIDE INHALATION SPRAY 2 PUFF: 3.12 SPRAY, METERED RESPIRATORY (INHALATION) at 07:05

## 2023-05-28 RX ADMIN — OXYBUTYNIN CHLORIDE 10 MG: 5 TABLET, EXTENDED RELEASE ORAL at 08:05

## 2023-05-28 RX ADMIN — QUETIAPINE FUMARATE 200 MG: 100 TABLET ORAL at 09:05

## 2023-05-28 RX ADMIN — FAMOTIDINE 20 MG: 20 TABLET, FILM COATED ORAL at 09:05

## 2023-05-28 RX ADMIN — HYDROCORTISONE 5 MG: 5 TABLET ORAL at 09:05

## 2023-05-28 RX ADMIN — DOCUSATE SODIUM 200 MG: 100 CAPSULE, LIQUID FILLED ORAL at 09:05

## 2023-05-28 RX ADMIN — FUROSEMIDE 60 MG: 10 INJECTION, SOLUTION INTRAMUSCULAR; INTRAVENOUS at 10:05

## 2023-05-28 RX ADMIN — ENOXAPARIN SODIUM 40 MG: 40 INJECTION SUBCUTANEOUS at 06:05

## 2023-05-28 RX ADMIN — FUROSEMIDE 60 MG: 10 INJECTION, SOLUTION INTRAMUSCULAR; INTRAVENOUS at 09:05

## 2023-05-28 RX ADMIN — HYDROMORPHONE HYDROCHLORIDE 0.5 MG: 1 INJECTION, SOLUTION INTRAMUSCULAR; INTRAVENOUS; SUBCUTANEOUS at 05:05

## 2023-05-28 RX ADMIN — HYDROMORPHONE HYDROCHLORIDE 0.5 MG: 1 INJECTION, SOLUTION INTRAMUSCULAR; INTRAVENOUS; SUBCUTANEOUS at 09:05

## 2023-05-28 RX ADMIN — HYDROMORPHONE HYDROCHLORIDE 0.5 MG: 1 INJECTION, SOLUTION INTRAMUSCULAR; INTRAVENOUS; SUBCUTANEOUS at 03:05

## 2023-05-28 RX ADMIN — FLUOXETINE HYDROCHLORIDE 60 MG: 20 CAPSULE ORAL at 08:05

## 2023-05-28 NOTE — PLAN OF CARE
Problem: Adult Inpatient Plan of Care  Goal: Plan of Care Review  Outcome: Ongoing, Progressing     Virtual nurse: Vitals, orders, labs, and progress notes reviewed. Care plan updated.

## 2023-05-28 NOTE — NURSING
Pt AAOx4. C/O pain in lower extremities bilateral.  Pt is non compliant with cardiac diet order and 1800 fluid restriction. Pt administered medications per MAR. Wound care performed to lower extremities bilateral. CHG care performed to suprapubic catheter. Tele monitor in room. Safety maintained. Call light within reach. Pt encouraged to call for assistance.

## 2023-05-28 NOTE — PROGRESS NOTES
[facility]  Progress Note    Patient Name: Tasha Hawley  MRN: [id number]  Patient Class: OP- Observation   Admission Date: 5/24/2023  Length of Stay: 0 days  Attending Physician: Nic Mistry, *  Primary Care Provider: Mesfin Hodges Ii, MD        Subjective:     Principal Problem:Lymphedema        HPI:  Tasha Hawley is a 67 year old female w/ a significant PMH including chronic pain with long-term opioid use, recurrent UTIs, lymphedema, debility, chronic diastolic HF, neurogenic bladder w/ suprapubic catheter, depression, and anxiety. She was recently discharged after admission for pneumonia, for which she completed a course of PO abx. She presented to the ED with c/o chronic BLE pain that is worsening as well as increased BLE edema. She reports compliance with lasix. She has chronic lymphedema of BLE, however, her  reports it is much worse over the last 2 days and now with weeping. She also reported CP in the ED, however, denies that on my exam. She has chronic SOB and is on home O2 4L, she denies change in SOB. Patient lethargic but easily arousable on exam. Her , Dangelo, answered majority of history questions.           Overview/Hospital Course:  No notes on file    Interval History:  Intermittent episodes of her chronic, noncardiac chest pain.  Also with leg pain, although they are improving per nursing who we wrapped her earlier.  Continue with IV diuretics; add Diamox today    Review of Systems   Cardiovascular:  Positive for leg swelling. Negative for chest pain.   Objective:     Vital Signs (Most Recent):  Temp: 98.2 °F (36.8 °C) (05/28/23 1146)  Pulse: 61 (05/28/23 1146)  Resp: 18 (05/28/23 1146)  BP: (!) 107/54 (05/28/23 1146)  SpO2: 95 % (05/28/23 1150) Vital Signs (24h Range):  Temp:  [97.7 °F (36.5 °C)-98.9 °F (37.2 °C)] 98.2 °F (36.8 °C)  Pulse:  [48-76] 61  Resp:  [16-22] 18  SpO2:  [94 %-98 %] 95 %  BP: ()/(44-61) 107/54     Weight:  103.4 kg (227 lb 15.3 oz)  Body mass index is 37.93 kg/m².    Intake/Output Summary (Last 24 hours) at 2023 1205  Last data filed at 2023 0653  Gross per 24 hour   Intake 2380 ml   Output 4600 ml   Net -2220 ml           Physical Exam  Vitals and nursing note reviewed.   Constitutional:       General: She is not in acute distress.     Appearance: She is obese. She is ill-appearing.   HENT:      Head: Normocephalic and atraumatic.   Cardiovascular:      Rate and Rhythm: Normal rate and regular rhythm.      Pulses:           Dorsalis pedis pulses are 1+ on the right side and 1+ on the left side.   Musculoskeletal:      Right lower le+ Pitting Edema present.      Left lower le+ Pitting Edema present.   Skin:     General: Skin is warm.      Findings: Erythema present.      Comments: Bilateral lower extremities bandaged; see wound care photos   Neurological:      Mental Status: She is alert, oriented to person, place, and time and easily aroused.   Psychiatric:         Mood and Affect: Mood normal.         Behavior: Behavior normal.         Thought Content: Thought content normal.         Judgment: Judgment normal.           Significant Labs: All pertinent labs within the past 24 hours have been reviewed.    Significant Imaging: I have reviewed all pertinent imaging results/findings within the past 24 hours.      Assessment/Plan:      * Lymphedema  · Chronic but acutely worse with associated increase in pain  · IV Lasix 60 mg b.i.d.; add acetazolamide  · BLE ultrasound without clot  · MALLORIE hose ordered  · If no improvement from Lasix, may benefit from general surgery consult for recommendations  · Continue pain management with intrathecal pump and home pain medication regimen  · Hold p.r.n. pain medications or muscle relaxers for SBP <100 or increased lethargy        Intractable pain  · Continue pain management with intrathecal pump and home pain medication regimen  · Hold p.r.n. pain medications or  muscle relaxers for SBP <100 or increased lethargy      Severe obesity (BMI 35.0-39.9) with comorbidity  Body mass index is 37.93 kg/m². Morbid obesity complicates all aspects of disease management from diagnostic modalities to treatment. Weight loss encouraged and health benefits explained to patient.         Adrenal insufficiency  -continue home medications      Debility  · Will order PT and OT  · Expect will benefit from home health      Leg pain, bilateral  · See above for plan      Chronic diastolic heart failure        Suprapubic catheter  · Chronic   · Neurogenic bladder      Hypothyroidism (acquired)  -continue home medications      Narcotic dependency, continuous  -see above      Sleep apnea  · Continue home CPAP        VTE Risk Mitigation (From admission, onward)         Ordered     enoxaparin injection 40 mg  Daily         05/24/23 2034     IP VTE HIGH RISK PATIENT  Once         05/24/23 2034                Discharge Planning   JACKIE:      Code Status: Full Code   Is the patient medically ready for discharge?:     Reason for patient still in hospital (select all that apply): Treatment  Discharge Plan A: Home with family, Home Health                  Nic Mistry MD  Department of Hospital Medicine   [facility]

## 2023-05-28 NOTE — NURSING
Assumed care of patient from [place] at approximately 0400, patient is AAOX4, switched from BiPap to 4LNC, c/o 8/10 pain, PRN meds given, suprapubic catheter 20 [place]ian, bilateral legs wrapped in ACE bandage, all safety precautions are in place, call bell and tray table are within reach of the patient and will continue to monitor.

## 2023-05-28 NOTE — ASSESSMENT & PLAN NOTE
· Chronic but acutely worse with associated increase in pain  · IV Lasix 60 mg b.i.d.; add acetazolamide  · BLE ultrasound without clot  · MALLORIE hose ordered  · If no improvement from Lasix, may benefit from general surgery consult for recommendations  · Continue pain management with intrathecal pump and home pain medication regimen  · Hold p.r.n. pain medications or muscle relaxers for SBP <100 or increased lethargy

## 2023-05-28 NOTE — SUBJECTIVE & OBJECTIVE
Interval History:  Intermittent episodes of her chronic, noncardiac chest pain.  Also with leg pain, although they are improving per nursing who we wrapped her earlier.  Continue with IV diuretics; add Diamox today    Review of Systems   Cardiovascular:  Positive for leg swelling. Negative for chest pain.   Objective:     Vital Signs (Most Recent):  Temp: 98.2 °F (36.8 °C) (23 1146)  Pulse: 61 (23 1146)  Resp: 18 (23 1146)  BP: (!) 107/54 (23 1146)  SpO2: 95 % (23 1150) Vital Signs (24h Range):  Temp:  [97.7 °F (36.5 °C)-98.9 °F (37.2 °C)] 98.2 °F (36.8 °C)  Pulse:  [48-76] 61  Resp:  [16-22] 18  SpO2:  [94 %-98 %] 95 %  BP: ()/(44-61) 107/54     Weight: 103.4 kg (227 lb 15.3 oz)  Body mass index is 37.93 kg/m².    Intake/Output Summary (Last 24 hours) at 2023 1205  Last data filed at 2023 0653  Gross per 24 hour   Intake 2380 ml   Output 4600 ml   Net -2220 ml           Physical Exam  Vitals and nursing note reviewed.   Constitutional:       General: She is not in acute distress.     Appearance: She is obese. She is ill-appearing.   HENT:      Head: Normocephalic and atraumatic.   Cardiovascular:      Rate and Rhythm: Normal rate and regular rhythm.      Pulses:           Dorsalis pedis pulses are 1+ on the right side and 1+ on the left side.   Musculoskeletal:      Right lower le+ Pitting Edema present.      Left lower le+ Pitting Edema present.   Skin:     General: Skin is warm.      Findings: Erythema present.      Comments: Bilateral lower extremities bandaged; see wound care photos   Neurological:      Mental Status: She is alert, oriented to person, place, and time and easily aroused.   Psychiatric:         Mood and Affect: Mood normal.         Behavior: Behavior normal.         Thought Content: Thought content normal.         Judgment: Judgment normal.           Significant Labs: All pertinent labs within the past 24 hours have been  reviewed.    Significant Imaging: I have reviewed all pertinent imaging results/findings within the past 24 hours.

## 2023-05-29 LAB
ANION GAP SERPL CALC-SCNC: 12 MMOL/L (ref 8–16)
BASOPHILS # BLD AUTO: 0.02 K/UL (ref 0–0.2)
BASOPHILS NFR BLD: 0.4 % (ref 0–1.9)
BUN SERPL-MCNC: 10 MG/DL (ref 8–23)
CALCIUM SERPL-MCNC: 9.8 MG/DL (ref 8.7–10.5)
CHLORIDE SERPL-SCNC: 94 MMOL/L (ref 95–110)
CO2 SERPL-SCNC: 30 MMOL/L (ref 23–29)
CREAT SERPL-MCNC: 0.9 MG/DL (ref 0.5–1.4)
DIFFERENTIAL METHOD: ABNORMAL
EOSINOPHIL # BLD AUTO: 0.3 K/UL (ref 0–0.5)
EOSINOPHIL NFR BLD: 5.3 % (ref 0–8)
ERYTHROCYTE [DISTWIDTH] IN BLOOD BY AUTOMATED COUNT: 13.5 % (ref 11.5–14.5)
EST. GFR  (NO RACE VARIABLE): >60 ML/MIN/1.73 M^2
GLUCOSE SERPL-MCNC: 102 MG/DL (ref 70–110)
HCT VFR BLD AUTO: 35.3 % (ref 37–48.5)
HGB BLD-MCNC: 10.6 G/DL (ref 12–16)
IMM GRANULOCYTES # BLD AUTO: 0.01 K/UL (ref 0–0.04)
IMM GRANULOCYTES NFR BLD AUTO: 0.2 % (ref 0–0.5)
LYMPHOCYTES # BLD AUTO: 1.3 K/UL (ref 1–4.8)
LYMPHOCYTES NFR BLD: 24.9 % (ref 18–48)
MAGNESIUM SERPL-MCNC: 2.1 MG/DL (ref 1.6–2.6)
MCH RBC QN AUTO: 25.6 PG (ref 27–31)
MCHC RBC AUTO-ENTMCNC: 30 G/DL (ref 32–36)
MCV RBC AUTO: 85 FL (ref 82–98)
MONOCYTES # BLD AUTO: 0.5 K/UL (ref 0.3–1)
MONOCYTES NFR BLD: 9.8 % (ref 4–15)
NEUTROPHILS # BLD AUTO: 3.2 K/UL (ref 1.8–7.7)
NEUTROPHILS NFR BLD: 59.4 % (ref 38–73)
NRBC BLD-RTO: 0 /100 WBC
PLATELET # BLD AUTO: 245 K/UL (ref 150–450)
PLATELET BLD QL SMEAR: ABNORMAL
PMV BLD AUTO: 10.4 FL (ref 9.2–12.9)
POTASSIUM SERPL-SCNC: 3.9 MMOL/L (ref 3.5–5.1)
RBC # BLD AUTO: 4.14 M/UL (ref 4–5.4)
SODIUM SERPL-SCNC: 136 MMOL/L (ref 136–145)
WBC # BLD AUTO: 5.31 K/UL (ref 3.9–12.7)

## 2023-05-29 PROCEDURE — 96374 THER/PROPH/DIAG INJ IV PUSH: CPT | Mod: 59

## 2023-05-29 PROCEDURE — 25000003 PHARM REV CODE 250: Performed by: STUDENT IN AN ORGANIZED HEALTH CARE EDUCATION/TRAINING PROGRAM

## 2023-05-29 PROCEDURE — 80048 BASIC METABOLIC PNL TOTAL CA: CPT | Performed by: HOSPITALIST

## 2023-05-29 PROCEDURE — 99900035 HC TECH TIME PER 15 MIN (STAT)

## 2023-05-29 PROCEDURE — 25000003 PHARM REV CODE 250: Performed by: NURSE PRACTITIONER

## 2023-05-29 PROCEDURE — G0378 HOSPITAL OBSERVATION PER HR: HCPCS

## 2023-05-29 PROCEDURE — 97530 THERAPEUTIC ACTIVITIES: CPT

## 2023-05-29 PROCEDURE — 63600175 PHARM REV CODE 636 W HCPCS: Performed by: HOSPITALIST

## 2023-05-29 PROCEDURE — 63600175 PHARM REV CODE 636 W HCPCS: Performed by: NURSE PRACTITIONER

## 2023-05-29 PROCEDURE — 96372 THER/PROPH/DIAG INJ SC/IM: CPT | Performed by: NURSE PRACTITIONER

## 2023-05-29 PROCEDURE — 36415 COLL VENOUS BLD VENIPUNCTURE: CPT | Performed by: HOSPITALIST

## 2023-05-29 PROCEDURE — 83735 ASSAY OF MAGNESIUM: CPT | Performed by: HOSPITALIST

## 2023-05-29 PROCEDURE — 96376 TX/PRO/DX INJ SAME DRUG ADON: CPT

## 2023-05-29 PROCEDURE — 85025 COMPLETE CBC W/AUTO DIFF WBC: CPT | Performed by: HOSPITALIST

## 2023-05-29 PROCEDURE — 25000003 PHARM REV CODE 250: Performed by: HOSPITALIST

## 2023-05-29 PROCEDURE — 94660 CPAP INITIATION&MGMT: CPT

## 2023-05-29 PROCEDURE — 94640 AIRWAY INHALATION TREATMENT: CPT

## 2023-05-29 PROCEDURE — 96375 TX/PRO/DX INJ NEW DRUG ADDON: CPT

## 2023-05-29 PROCEDURE — 27000221 HC OXYGEN, UP TO 24 HOURS

## 2023-05-29 PROCEDURE — 94761 N-INVAS EAR/PLS OXIMETRY MLT: CPT

## 2023-05-29 PROCEDURE — 94799 UNLISTED PULMONARY SVC/PX: CPT

## 2023-05-29 RX ORDER — ACETAMINOPHEN 325 MG/1
650 TABLET ORAL EVERY 6 HOURS PRN
Status: DISCONTINUED | OUTPATIENT
Start: 2023-05-29 | End: 2023-05-30 | Stop reason: HOSPADM

## 2023-05-29 RX ORDER — FLUDROCORTISONE ACETATE 0.1 MG/1
100 TABLET ORAL DAILY
Status: DISCONTINUED | OUTPATIENT
Start: 2023-05-29 | End: 2023-05-30 | Stop reason: HOSPADM

## 2023-05-29 RX ADMIN — HYDROCORTISONE 10 MG: 5 TABLET ORAL at 06:05

## 2023-05-29 RX ADMIN — FLUOXETINE HYDROCHLORIDE 60 MG: 20 CAPSULE ORAL at 10:05

## 2023-05-29 RX ADMIN — HYDROCORTISONE 5 MG: 5 TABLET ORAL at 09:05

## 2023-05-29 RX ADMIN — LEVOTHYROXINE SODIUM 150 MCG: 75 TABLET ORAL at 06:05

## 2023-05-29 RX ADMIN — SENNOSIDES 2 TABLET: 8.6 TABLET, FILM COATED ORAL at 09:05

## 2023-05-29 RX ADMIN — FAMOTIDINE 20 MG: 20 TABLET, FILM COATED ORAL at 09:05

## 2023-05-29 RX ADMIN — HYDROMORPHONE HYDROCHLORIDE 0.5 MG: 1 INJECTION, SOLUTION INTRAMUSCULAR; INTRAVENOUS; SUBCUTANEOUS at 06:05

## 2023-05-29 RX ADMIN — TIOTROPIUM BROMIDE INHALATION SPRAY 2 PUFF: 3.12 SPRAY, METERED RESPIRATORY (INHALATION) at 07:05

## 2023-05-29 RX ADMIN — TRAZODONE HYDROCHLORIDE 300 MG: 100 TABLET ORAL at 09:05

## 2023-05-29 RX ADMIN — ENOXAPARIN SODIUM 40 MG: 40 INJECTION SUBCUTANEOUS at 05:05

## 2023-05-29 RX ADMIN — LIDOCAINE 1 PATCH: 50 PATCH CUTANEOUS at 10:05

## 2023-05-29 RX ADMIN — ACETAZOLAMIDE 250 MG: 250 TABLET ORAL at 10:05

## 2023-05-29 RX ADMIN — FERROUS SULFATE TAB 325 MG (65 MG ELEMENTAL FE) 1 EACH: 325 (65 FE) TAB at 10:05

## 2023-05-29 RX ADMIN — ASPIRIN 81 MG: 81 TABLET, COATED ORAL at 10:05

## 2023-05-29 RX ADMIN — SENNOSIDES 2 TABLET: 8.6 TABLET, FILM COATED ORAL at 10:05

## 2023-05-29 RX ADMIN — QUETIAPINE FUMARATE 200 MG: 100 TABLET ORAL at 09:05

## 2023-05-29 RX ADMIN — HYDROCODONE BITARTRATE AND ACETAMINOPHEN 1 TABLET: 10; 325 TABLET ORAL at 04:05

## 2023-05-29 RX ADMIN — LIOTHYRONINE SODIUM 25 MCG: 25 TABLET ORAL at 10:05

## 2023-05-29 RX ADMIN — FAMOTIDINE 20 MG: 20 TABLET, FILM COATED ORAL at 10:05

## 2023-05-29 RX ADMIN — DOCUSATE SODIUM 200 MG: 100 CAPSULE, LIQUID FILLED ORAL at 09:05

## 2023-05-29 RX ADMIN — OXYBUTYNIN CHLORIDE 10 MG: 5 TABLET, EXTENDED RELEASE ORAL at 10:05

## 2023-05-29 RX ADMIN — ATORVASTATIN CALCIUM 80 MG: 40 TABLET, FILM COATED ORAL at 10:05

## 2023-05-29 RX ADMIN — FUROSEMIDE 60 MG: 10 INJECTION, SOLUTION INTRAMUSCULAR; INTRAVENOUS at 10:05

## 2023-05-29 RX ADMIN — FLUDROCORTISONE ACETATE 100 MCG: 0.1 TABLET ORAL at 11:05

## 2023-05-29 RX ADMIN — ACETAMINOPHEN 650 MG: 325 TABLET ORAL at 05:05

## 2023-05-29 RX ADMIN — ACETAZOLAMIDE 250 MG: 250 TABLET ORAL at 02:05

## 2023-05-29 NOTE — PLAN OF CARE
SW met with pt at bedside during rounding with MD. No dc today. Upon dc pt will return home with HH. Pt  will provide transport home upon dc. SW will continue to follow pt throughout her transitions of care and assist with any dc needs.     Future Appointments   Date Time Provider Department Center   6/12/2023  1:30 PM Efe Hickey MD [facility] ENDODIA [facility]   6/22/2023 11:20 AM Mesfin Hodges II, MD [facility]   6/28/2023  8:00 AM Jose Ortiz MD [facility] PULMSVC [facility]   6/30/2023 10:00 AM Kathryn Pittman MD [facility] [place]   7/7/2023 11:00 AM Mesfin Hodges II, MD [facility]   7/24/2023  9:05 AM LAB, APPOINTMENT [facility] LAB [facility]        05/29/23 0920   Post-Acute Status   Post-Acute Authorization Other

## 2023-05-29 NOTE — PLAN OF CARE
Problem: Adult Inpatient Plan of Care  Goal: Plan of Care Review  Outcome: Ongoing, Progressing  Goal: Patient-Specific Goal (Individualized)  Outcome: Ongoing, Progressing  Goal: Absence of Hospital-Acquired Illness or Injury  Outcome: Ongoing, Progressing  Goal: Optimal Comfort and Wellbeing  Outcome: Ongoing, Progressing  Goal: Readiness for Transition of Care  Outcome: Ongoing, Progressing     Problem: Fluid Imbalance (Pneumonia)  Goal: Fluid Balance  Outcome: Ongoing, Progressing     Problem: Infection (Pneumonia)  Goal: Resolution of Infection Signs and Symptoms  Outcome: Ongoing, Progressing     Problem: Respiratory Compromise (Pneumonia)  Goal: Effective Oxygenation and Ventilation  Outcome: Ongoing, Progressing     Problem: Heart Failure Comorbidity  Goal: Maintenance of Heart Failure Symptom Control  Outcome: Ongoing, Progressing     Problem: Fall Injury Risk  Goal: Absence of Fall and Fall-Related Injury  Outcome: Ongoing, Progressing     Problem: Obstructive Sleep Apnea Risk or Actual Comorbidity Management  Goal: Unobstructed Breathing During Sleep  Outcome: Ongoing, Progressing     Problem: Pain Chronic (Persistent) (Comorbidity Management)  Goal: Acceptable Pain Control and Functional Ability  Outcome: Ongoing, Progressing     Problem: Skin Injury Risk Increased  Goal: Skin Health and Integrity  Outcome: Ongoing, Progressing     Problem: Pain Acute  Goal: Acceptable Pain Control and Functional Ability  Outcome: Ongoing, Progressing     Problem: Fluid Volume Excess  Goal: Fluid Balance  Outcome: Ongoing, Progressing     Problem: Impaired Wound Healing  Goal: Optimal Wound Healing  Outcome: Ongoing, Progressing       POC reviewed with pt, following- VSS, NADN, no falls or injuries noted. PRN medication given for pain. CB in reach at all times, instructed to call for needs not met on rounds, v/u.

## 2023-05-29 NOTE — PT/OT/SLP PROGRESS
Physical Therapy      Patient Name:  Tasha Hawley   MRN:  [id number]    Patient not seen today secondary to  (pt unable to be seen secondary to increased nausea and feeling poorly X 2 attempts). Will follow-up tomorrow.

## 2023-05-29 NOTE — PROGRESS NOTES
Notified by PT/OT of skin tear to R buttock- partial thickness related to friction of brief. Applied Triad ointment and will notify MD team and nursing for orders of BID application of Triad ointment.

## 2023-05-29 NOTE — PROGRESS NOTES
[facility] team please refer Tasha to Heme-Onc for follow-up for evaluation for IV iron in light of her iron deficiency anemia in her low hemoglobin.  Referrals been placed again thank you.    [facility] team - please tell patient that they are iron deficient and anemic and recommend that they take ferrous gluconate one 324mg pill once daily for next 3 months.    [facility] team -  Please order repeat fasting Hemoglobin, Iron/TIBC, and Ferritin in 8 weeks - Orders placed.

## 2023-05-29 NOTE — NURSING
Pt AAOx4. Multiple C/O pain in lower extremities bilaterally. BP systolic running in 80-90 throughout shift. MD notified. Diuretics and IV dilaudid DC. Pt ordered tylenol PRN for pain. Pt is non compliant with diet order as well as fluid restriction. Wound care to lower extremities performed. CHG bath to suprapubic catheter performed. Safety maintained. Call light within reach.

## 2023-05-29 NOTE — PT/OT/SLP PROGRESS
"Occupational Therapy   Treatment    Name: Tasha Hawley  MRN: [id number]  Admitting Diagnosis:  Lymphedema       Recommendations:     Discharge Recommendations:  (moderate intensive therapy)  Discharge Equipment Recommendations:  to be determined by next level of care  Barriers to discharge:   (pt requires increased level of assist)    Assessment:     Tasha Hawley is a 67 y.o. female with a medical diagnosis of Lymphedema.  She presents with The primary encounter diagnosis was Intractable pain. Diagnoses of Shortness of breath, Leg pain, bilateral, Lymphedema, Chest pain, unspecified type, and Chest pain were also pertinent to this visit.  . Performance deficits affecting function are weakness, impaired endurance, impaired cognition, decreased ROM, impaired self care skills, impaired coordination, decreased coordination, decreased upper extremity function, decreased lower extremity function, impaired functional mobility, gait instability, impaired balance, pain, edema, decreased safety awareness, impaired skin.     Pt remains with increased pain limiting ax tolerance this date. Also perseverating over receiving water and her room being cleaned. Requires increased time for rest breaks during axs. Cont OT POC     Rehab Prognosis:  Good; patient would benefit from acute skilled OT services to address these deficits and reach maximum level of function.       Plan:     Patient to be seen 3 x/week to address the above listed problems via self-care/home management, therapeutic exercises, therapeutic activities  Plan of Care Expires: 06/26/23  Plan of Care Reviewed with: patient    Subjective     Chief Complaint: pain; also wants her linens changed and room cleaned; asking for water   Patient/Family Comments/goals: none stated   Pain/Comfort:  Pain Rating 1:  (pt states, " 10, 9, 8" when asked to clarify)  Location 1:  (back, legs, feet)  Pain Addressed 1: Reposition, Distraction, Cessation of Activity  Pain Rating " Post-Intervention 1:  (did not rate)    Objective:     Communicated with: nsg prior to session.  Patient found supine with   upon OT entry to room.    General Precautions: Standard, fall    Orthopedic Precautions:N/A  Braces: N/A  Respiratory Status: Nasal cannula, flow 4 L/min     Occupational Performance:     Bed Mobility:    Patient completed Scooting/Bridging with stand by assistance  Patient completed Supine to Sit with minimum assistance     Functional Mobility/Transfers:  Patient completed Sit <> Stand Transfer with minimum assistance  with  rolling walker   Patient completed Bed <> Chair Transfer using Step Transfer technique with contact guard assistance and minimum assistance with rolling walker  Functional Mobility: CGA- Min A with RW; cues for safety awareness; significantly forward flexed posture over RW; places L forearm over arm rest of RW     Activities of Daily Living:  Lower Body Dressing: minimum assistance simulated; pt requires assist in stance to adjust undergarments; LOB in stance when both hands removed from RW to assist with adjustment       [facility] 6 Click ADL: 15    Treatment & Education:  Pt found supine; perseverates over linens being changed this date, as well as room to be cleaned   Pt requires cues for safety awareness throughout session   First attempt to stand from EOb unsuccessful 2/2 reports of pain and pt attempting to pull with BUEs on RW; educated on safe and correct hand placement   T/f to b/s chair   Functional mobility in room performed over 2 trials with seated rest break       Patient left up in chair with all lines intact, call button in reach, chair alarm on, and nsg notified    GOALS:   Multidisciplinary Problems       Occupational Therapy Goals          Problem: Occupational Therapy    Goal Priority Disciplines Outcome Interventions   Occupational Therapy Goal     OT, PT/OT Ongoing, Progressing    Description: Goals to be met by: 6/26/2023     Patient will increase  functional independence with ADLs by performing:    Toileting from bedside commode with Minimal Assistance for hygiene and clothing management.   Step transfer with Supervision & appropriate AD.  Toilet transfer to bedside commode with Supervision & appropriate AD.                         Time Tracking:     OT Date of Treatment: 05/29/23  OT Start Time: 0928  OT Stop Time: 0957  OT Total Time (min): 29 min    Billable Minutes:Therapeutic Activity 29    OT/SAMANTHA: OT     Number of SAMANTHA visits since last OT visit: 0    5/29/2023

## 2023-05-29 NOTE — SUBJECTIVE & OBJECTIVE
Interval History:  Intermittent episodes of chronic, noncardiac chest pain-tender to palpate.  Continues to have bilateral leg pain.  Swelling has improved.  Hypotensive with systolic in the 80s this afternoon following which IV diuretics discontinued.    Review of Systems   Cardiovascular:  Positive for leg swelling. Negative for chest pain.   Objective:     Vital Signs (Most Recent):  Temp: 97.5 °F (36.4 °C) (05/29/23 1531)  Pulse: 64 (05/29/23 1531)  Resp: 16 (05/29/23 1531)  BP: (!) 88/50 (05/29/23 1531)  SpO2: 99 % (05/29/23 1531) Vital Signs (24h Range):  Temp:  [97.5 °F (36.4 °C)-98.4 °F (36.9 °C)] 97.5 °F (36.4 °C)  Pulse:  [50-74] 64  Resp:  [14-20] 16  SpO2:  [93 %-100 %] 99 %  BP: ()/(46-68) 88/50     Weight: 95.3 kg (210 lb 1.6 oz)  Body mass index is 34.96 kg/m².    Intake/Output Summary (Last 24 hours) at 5/29/2023 1624  Last data filed at 5/29/2023 1451  Gross per 24 hour   Intake 1100 ml   Output 4475 ml   Net -3375 ml           Physical Exam  Vitals and nursing note reviewed.   Constitutional:       General: She is not in acute distress.     Appearance: She is obese. She is ill-appearing.   Cardiovascular:      Rate and Rhythm: Normal rate and regular rhythm.   Musculoskeletal:      Right lower leg: Edema (chronic lymphedema) present.      Left lower leg: Edema present.   Skin:     General: Skin is warm.      Findings: Erythema present.      Comments: Bilateral lower extremities bandaged; see wound care photos   Neurological:      Mental Status: She is alert, oriented to person, place, and time and easily aroused.   Psychiatric:         Mood and Affect: Mood normal.           Significant Labs: All pertinent labs within the past 24 hours have been reviewed.    Significant Imaging: I have reviewed all pertinent imaging results/findings within the past 24 hours.

## 2023-05-29 NOTE — PROGRESS NOTES
[facility] Medicine  Progress Note    Patient Name: Tasha Hawley  MRN: [id number]  Patient Class: OP- Observation   Admission Date: 5/24/2023  Length of Stay: 0 days  Attending Physician: Bharti Sullivan MD  Primary Care Provider: Mesfin Hodges Ii, MD        Subjective:     Principal Problem:Lymphedema    HPI:  Tasha Hawley is a 67 year old female w/ a significant PMH including chronic pain with long-term opioid use, recurrent UTIs, lymphedema, debility, chronic diastolic HF, neurogenic bladder w/ suprapubic catheter, depression, and anxiety. She was recently discharged after admission for pneumonia, for which she completed a course of PO abx. She presented to the ED with c/o chronic BLE pain that is worsening as well as increased BLE edema. She reports compliance with lasix. She has chronic lymphedema of BLE, however, her  reports it is much worse over the last 2 days and now with weeping. She also reported CP in the ED, however, denies that on my exam. She has chronic SOB and is on home O2 4L, she denies change in SOB. Patient lethargic but easily arousable on exam. Her , Dangelo, answered majority of history questions.           Overview/Hospital Course:  No notes on file    Interval History:  Intermittent episodes of chronic, noncardiac chest pain-tender to palpate.  Continues to have bilateral leg pain.  Swelling has improved.  Hypotensive with systolic in the 80s this afternoon following which IV diuretics discontinued.    Review of Systems   Cardiovascular:  Positive for leg swelling. Negative for chest pain.   Objective:     Vital Signs (Most Recent):  Temp: 97.5 °F (36.4 °C) (05/29/23 1531)  Pulse: 64 (05/29/23 1531)  Resp: 16 (05/29/23 1531)  BP: (!) 88/50 (05/29/23 1531)  SpO2: 99 % (05/29/23 1531) Vital Signs (24h Range):  Temp:  [97.5 °F (36.4 °C)-98.4 °F (36.9 °C)] 97.5 °F (36.4 °C)  Pulse:  [50-74] 64  Resp:  [14-20] 16  SpO2:  [93 %-100 %] 99 %  BP:  ()/(46-68) 88/50     Weight: 95.3 kg (210 lb 1.6 oz)  Body mass index is 34.96 kg/m².    Intake/Output Summary (Last 24 hours) at 5/29/2023 1624  Last data filed at 5/29/2023 1451  Gross per 24 hour   Intake 1100 ml   Output 4475 ml   Net -3375 ml           Physical Exam  Vitals and nursing note reviewed.   Constitutional:       General: She is not in acute distress.     Appearance: She is obese. She is ill-appearing.   Cardiovascular:      Rate and Rhythm: Normal rate and regular rhythm.   Musculoskeletal:      Right lower leg: Edema (chronic lymphedema) present.      Left lower leg: Edema present.   Skin:     General: Skin is warm.      Findings: Erythema present.      Comments: Bilateral lower extremities bandaged; see wound care photos   Neurological:      Mental Status: She is alert, oriented to person, place, and time and easily aroused.   Psychiatric:         Mood and Affect: Mood normal.           Significant Labs: All pertinent labs within the past 24 hours have been reviewed.    Significant Imaging: I have reviewed all pertinent imaging results/findings within the past 24 hours.      Assessment/Plan:      * Lymphedema  · Chronic but acutely worse with associated increase in pain  · Received Lasix IV this morning.  Hold further diuresis given hypotension.  Can likely transition to oral diuretics tomorrow  · BLE ultrasound without clot  · MALLORIE hose ordered  · If no improvement from Lasix, may benefit from general surgery consult for recommendations  · Continue pain management with intrathecal pump and home pain medication regimen  · Hold p.r.n. pain medications or muscle relaxers for SBP <100 or increased lethargy        Intractable pain  · Continue pain management with intrathecal pump and home pain medication regimen  · Hold p.r.n. pain medications or muscle relaxers for SBP <100 or increased lethargy      Severe obesity (BMI 35.0-39.9) with comorbidity  Body mass index is 37.93 kg/m². Morbid obesity  complicates all aspects of disease management from diagnostic modalities to treatment. Weight loss encouraged and health benefits explained to patient.         Adrenal insufficiency  -continue home medications      Debility  · Will order PT and OT  · Expect will benefit from home health      Leg pain, bilateral  · See above for plan      Chronic diastolic heart failure        Suprapubic catheter  · Chronic   · Neurogenic bladder      Hypothyroidism (acquired)  -continue home medications      Narcotic dependency, continuous  -see above      Sleep apnea  · Continue home CPAP        VTE Risk Mitigation (From admission, onward)         Ordered     enoxaparin injection 40 mg  Daily         05/24/23 2034     IP VTE HIGH RISK PATIENT  Once         05/24/23 2034                Discharge Planning   JACKIE:      Code Status: Full Code   Is the patient medically ready for discharge?:     Reason for patient still in hospital (select all that apply): Patient trending condition  Discharge Plan A: Home with family, Home Health            Bharti Sullivan MD  Department of Hospital Medicine   [facility] S[facility]

## 2023-05-29 NOTE — ASSESSMENT & PLAN NOTE
· Chronic but acutely worse with associated increase in pain  · Received Lasix IV this morning.  Hold further diuresis given hypotension.  Can likely transition to oral diuretics tomorrow  · BLE ultrasound without clot  · MALLORIE hose ordered  · If no improvement from Lasix, may benefit from general surgery consult for recommendations  · Continue pain management with intrathecal pump and home pain medication regimen  · Hold p.r.n. pain medications or muscle relaxers for SBP <100 or increased lethargy

## 2023-05-30 VITALS
DIASTOLIC BLOOD PRESSURE: 47 MMHG | TEMPERATURE: 97 F | HEIGHT: 65 IN | HEART RATE: 65 BPM | OXYGEN SATURATION: 99 % | WEIGHT: 207.88 LBS | BODY MASS INDEX: 34.63 KG/M2 | SYSTOLIC BLOOD PRESSURE: 100 MMHG | RESPIRATION RATE: 18 BRPM

## 2023-05-30 LAB
ANION GAP SERPL CALC-SCNC: 13 MMOL/L (ref 8–16)
BUN SERPL-MCNC: 14 MG/DL (ref 8–23)
CALCIUM SERPL-MCNC: 10.2 MG/DL (ref 8.7–10.5)
CHLORIDE SERPL-SCNC: 96 MMOL/L (ref 95–110)
CO2 SERPL-SCNC: 29 MMOL/L (ref 23–29)
CREAT SERPL-MCNC: 1.1 MG/DL (ref 0.5–1.4)
EST. GFR  (NO RACE VARIABLE): 55 ML/MIN/1.73 M^2
GLUCOSE SERPL-MCNC: 97 MG/DL (ref 70–110)
MAGNESIUM SERPL-MCNC: 2.4 MG/DL (ref 1.6–2.6)
POTASSIUM SERPL-SCNC: 3.2 MMOL/L (ref 3.5–5.1)
SODIUM SERPL-SCNC: 138 MMOL/L (ref 136–145)

## 2023-05-30 PROCEDURE — 25000003 PHARM REV CODE 250: Performed by: NURSE PRACTITIONER

## 2023-05-30 PROCEDURE — G0378 HOSPITAL OBSERVATION PER HR: HCPCS

## 2023-05-30 PROCEDURE — 83735 ASSAY OF MAGNESIUM: CPT | Performed by: HOSPITALIST

## 2023-05-30 PROCEDURE — 99900035 HC TECH TIME PER 15 MIN (STAT)

## 2023-05-30 PROCEDURE — 36415 COLL VENOUS BLD VENIPUNCTURE: CPT | Performed by: HOSPITALIST

## 2023-05-30 PROCEDURE — 94761 N-INVAS EAR/PLS OXIMETRY MLT: CPT

## 2023-05-30 PROCEDURE — 25000003 PHARM REV CODE 250: Performed by: STUDENT IN AN ORGANIZED HEALTH CARE EDUCATION/TRAINING PROGRAM

## 2023-05-30 PROCEDURE — 27000221 HC OXYGEN, UP TO 24 HOURS

## 2023-05-30 PROCEDURE — 97116 GAIT TRAINING THERAPY: CPT | Mod: CQ

## 2023-05-30 PROCEDURE — 25000003 PHARM REV CODE 250: Performed by: HOSPITALIST

## 2023-05-30 PROCEDURE — 80048 BASIC METABOLIC PNL TOTAL CA: CPT | Performed by: HOSPITALIST

## 2023-05-30 PROCEDURE — 94799 UNLISTED PULMONARY SVC/PX: CPT

## 2023-05-30 PROCEDURE — 97110 THERAPEUTIC EXERCISES: CPT | Mod: CQ

## 2023-05-30 PROCEDURE — 94660 CPAP INITIATION&MGMT: CPT

## 2023-05-30 PROCEDURE — 94640 AIRWAY INHALATION TREATMENT: CPT

## 2023-05-30 RX ORDER — PANTOPRAZOLE SODIUM 40 MG/1
40 TABLET, DELAYED RELEASE ORAL DAILY
Qty: 90 TABLET | Refills: 3 | Status: SHIPPED | OUTPATIENT
Start: 2023-05-30

## 2023-05-30 RX ORDER — FLUDROCORTISONE ACETATE 0.1 MG/1
100 TABLET ORAL DAILY
Qty: 90 TABLET | Refills: 2 | Status: SHIPPED | OUTPATIENT
Start: 2023-05-30 | End: 2023-06-12

## 2023-05-30 RX ORDER — FUROSEMIDE 40 MG/1
40 TABLET ORAL 2 TIMES DAILY
Qty: 90 TABLET | Refills: 3 | Status: SHIPPED | OUTPATIENT
Start: 2023-05-30 | End: 2023-08-11

## 2023-05-30 RX ORDER — POTASSIUM CHLORIDE 20 MEQ/1
20 TABLET, EXTENDED RELEASE ORAL DAILY
Qty: 15 TABLET | Refills: 0 | Status: SHIPPED | OUTPATIENT
Start: 2023-05-30 | End: 2023-06-08 | Stop reason: DRUGHIGH

## 2023-05-30 RX ADMIN — LIOTHYRONINE SODIUM 25 MCG: 25 TABLET ORAL at 08:05

## 2023-05-30 RX ADMIN — FERROUS SULFATE TAB 325 MG (65 MG ELEMENTAL FE) 1 EACH: 325 (65 FE) TAB at 08:05

## 2023-05-30 RX ADMIN — SENNOSIDES 2 TABLET: 8.6 TABLET, FILM COATED ORAL at 08:05

## 2023-05-30 RX ADMIN — LEVOTHYROXINE SODIUM 150 MCG: 75 TABLET ORAL at 06:05

## 2023-05-30 RX ADMIN — TIOTROPIUM BROMIDE INHALATION SPRAY 2 PUFF: 3.12 SPRAY, METERED RESPIRATORY (INHALATION) at 07:05

## 2023-05-30 RX ADMIN — POTASSIUM BICARBONATE 40 MEQ: 391 TABLET, EFFERVESCENT ORAL at 10:05

## 2023-05-30 RX ADMIN — ATORVASTATIN CALCIUM 80 MG: 40 TABLET, FILM COATED ORAL at 08:05

## 2023-05-30 RX ADMIN — LIDOCAINE 1 PATCH: 50 PATCH CUTANEOUS at 08:05

## 2023-05-30 RX ADMIN — FLUDROCORTISONE ACETATE 100 MCG: 0.1 TABLET ORAL at 08:05

## 2023-05-30 RX ADMIN — OXYBUTYNIN CHLORIDE 10 MG: 5 TABLET, EXTENDED RELEASE ORAL at 08:05

## 2023-05-30 RX ADMIN — FAMOTIDINE 20 MG: 20 TABLET, FILM COATED ORAL at 08:05

## 2023-05-30 RX ADMIN — FLUOXETINE HYDROCHLORIDE 60 MG: 20 CAPSULE ORAL at 08:05

## 2023-05-30 RX ADMIN — HYDROCORTISONE 10 MG: 5 TABLET ORAL at 06:05

## 2023-05-30 RX ADMIN — ASPIRIN 81 MG: 81 TABLET, COATED ORAL at 08:05

## 2023-05-30 NOTE — PLAN OF CARE
Problem: Physical Therapy  Goal: Physical Therapy Goal  Description: Goals to be met by: 2023     Patient will increase functional independence with mobility by performin. Supine to sit with Stand-by Assistance  2. Sit to supine with Contact Guard Assistance  3. Sit to stand transfer with Stand-by Assistance using Rolling Walker  4. Bed to chair transfer with Stand-by Assistance using Rolling Walker  5. Gait  x 50 feet with Stand-by Assistance using Rolling Walker.   6. Lower extremity exercise program x10 reps with supervision    Outcome: Ongoing, Progressing

## 2023-05-30 NOTE — PLAN OF CARE
Problem: Adult Inpatient Plan of Care  Goal: Plan of Care Review  Outcome: Ongoing, Progressing  Goal: Patient-Specific Goal (Individualized)  Outcome: Ongoing, Progressing  Goal: Absence of Hospital-Acquired Illness or Injury  Outcome: Ongoing, Progressing  Goal: Optimal Comfort and Wellbeing  Outcome: Ongoing, Progressing  Goal: Readiness for Transition of Care  Outcome: Ongoing, Progressing     Problem: Fluid Imbalance (Pneumonia)  Goal: Fluid Balance  Outcome: Ongoing, Progressing     Problem: Infection (Pneumonia)  Goal: Resolution of Infection Signs and Symptoms  Outcome: Ongoing, Progressing     Problem: Respiratory Compromise (Pneumonia)  Goal: Effective Oxygenation and Ventilation  Outcome: Ongoing, Progressing     Problem: Heart Failure Comorbidity  Goal: Maintenance of Heart Failure Symptom Control  Outcome: Ongoing, Progressing     Problem: Fall Injury Risk  Goal: Absence of Fall and Fall-Related Injury  Outcome: Ongoing, Progressing     Problem: Obstructive Sleep Apnea Risk or Actual Comorbidity Management  Goal: Unobstructed Breathing During Sleep  Outcome: Ongoing, Progressing     Problem: Pain Chronic (Persistent) (Comorbidity Management)  Goal: Acceptable Pain Control and Functional Ability  Outcome: Ongoing, Progressing     Problem: Skin Injury Risk Increased  Goal: Skin Health and Integrity  Outcome: Ongoing, Progressing     Problem: Pain Acute  Goal: Acceptable Pain Control and Functional Ability  Outcome: Ongoing, Progressing     Problem: Fluid Volume Excess  Goal: Fluid Balance  Outcome: Ongoing, Progressing     Problem: Impaired Wound Healing  Goal: Optimal Wound Healing  Outcome: Ongoing, Progressing         POC reviewed with pt, following- VSS, NADN, pt resting quietly this shift. No falls or injuries noted, CB in reach at all times. Instructed to call for needs not met on rounds, v/u.

## 2023-05-30 NOTE — NURSING
[facility] RN met with [facility] regarding [facility] hospital follow up upon discharge. Pt agreeable to hospital follow up .RN informed pt of scheduled appointment and that appointment will also appear on d/c AVS. Patient informed to bring portable all medication bottles to [facility] follow up appointment.  [facility] information handout, appointment letter and folder provided to patient. Pt's daughter will provide transportation to appointment.    [facility] appointment discussed with pt's daughter Jaycee via phone in room who confirmed that she would be bring patient to her PCC appt.    Patient Contact info: [phone]    Person providing transportation contact info: [phone] (Mobile)    Barriers to attending P[facility] visit: pt has hx of no shows.     Future Appointments   Date Time Provider Department Center   6/8/2023 11:30 AM Imani Juarez MD [facility] IMPRI [place] [facility]   6/12/2023  1:30 PM Efe Hickey MD [facility] ENDODIA [address]   6/13/2023  1:30 PM Amanda Head NP [facility] HEM ONC [place] [facility]   6/22/2023 11:20 AM Mesfin Hodges II, MD [facility] IM [address] [facility]   6/28/2023  8:00 AM Jose Ortiz MD [facility] PULMSVC [address]   6/30/2023 10:00 AM Kathryn Pittman MD [facility] [place]   7/7/2023 11:00 AM Mesfin Hodges II, MD [facility] IM [address] [facility]W   7/24/2023  9:05 AM LAB, APPOINTMENT [facility] LAB [facility]   7/31/2023 11:30 AM Prince Vance MD [facility] GASTRO [address]

## 2023-05-30 NOTE — HOSPITAL COURSE
67 year old female w/ a significant PMH including chronic pain with long-term opioid use (intrathecal dilaudid pump), recurrent UTIs, lymphedema, debility, chronic diastolic HF, neurogenic bladder w/ suprapubic catheter, depression, and anxiety presented to the ER with complain of worsening bilateral lower extremity edema and pain.  Aggressively diuresed with IV Lasix with improvement in lower extremity edema.  Transitioned to oral diuretic.  Bilateral lower extremity ultrasound showed no DVT.  Hypokalemia blood now with potassium 3.2 replaced with 80 mEq prior to discharge.  Discharged on potassium supplements. Blood pressure remains low at baseline but asymptomatic. Given multiple medical comorbidities, noncompliance with medications and follow-up and recurrent hospitalizations -patient set up with close prior to care clinic follow-up.  She would benefit from initiation of goals of care discussion and outpatient palliative follow-up.  She has not had formal evaluation by endocrinology for adrenal insufficiency.  Would benefit from endocrinology follow-up.      Patient and family would not like nursing home placement or SNF for rehab.  Home health orders placed.  Patient's stepdaughter Jaycee updated on plan of care.  She notes patient is unable to make it to appointments and is not compliant with medications at home.  She will assist with followed up with PCC.     Physical Exam  Vitals and nursing note reviewed.   Constitutional:       General: She is not in acute distress.     Appearance: She is obese. She is ill-appearing.   Cardiovascular:      Rate and Rhythm: Normal rate and regular rhythm.   Musculoskeletal:      Right lower leg: Edema (chronic lymphedema) present.      Left lower leg: Edema present.   Skin:     General: Skin is warm.      Comments: Bilateral lower extremities bandaged  Neurological:      Mental Status: She is alert, oriented to person, place, and time and easily aroused.   Psychiatric:          Mood and Affect: Mood normal.

## 2023-05-30 NOTE — DISCHARGE SUMMARY
[facility]  Discharge Summary      Patient Name: Tasha Hawley  MRN: [id number]  LUH: [id number]  Patient Class: OP- Observation  Admission Date: 5/24/2023  Hospital Length of Stay: 0 days  Discharge Date and Time: 5/30/2023  1:05 PM  Attending Physician: No att. providers found   Discharging Provider: Bharti Sullivan MD  Primary Care Provider: Mesfin Hodges Ii, MD    Primary Care Team: Networked reference to record PCT     HPI:   Tasha Hawley is a 67 year old female w/ a significant PMH including chronic pain with long-term opioid use, recurrent UTIs, lymphedema, debility, chronic diastolic HF, neurogenic bladder w/ suprapubic catheter, depression, and anxiety. She was recently discharged after admission for pneumonia, for which she completed a course of PO abx. She presented to the ED with c/o chronic BLE pain that is worsening as well as increased BLE edema. She reports compliance with lasix. She has chronic lymphedema of BLE, however, her  reports it is much worse over the last 2 days and now with weeping. She also reported CP in the ED, however, denies that on my exam. She has chronic SOB and is on home O2 4L, she denies change in SOB. Patient lethargic but easily arousable on exam. Her , Dangelo, answered majority of history questions.           * No surgery found *      Hospital Course:   67 year old female w/ a significant PMH including chronic pain with long-term opioid use (intrathecal dilaudid pump), recurrent UTIs, lymphedema, debility, chronic diastolic HF, neurogenic bladder w/ suprapubic catheter, depression, and anxiety presented to the ER with complain of worsening bilateral lower extremity edema and pain.  Aggressively diuresed with IV Lasix with improvement in lower extremity edema.  Transitioned to oral diuretic.  Bilateral lower extremity ultrasound showed no DVT.  Hypokalemia blood now with potassium 3.2 replaced with 80 mEq prior to discharge.   Discharged on potassium supplements. Blood pressure remains low at baseline but asymptomatic. Given multiple medical comorbidities, noncompliance with medications and follow-up and recurrent hospitalizations -patient set up with close prior to care clinic follow-up.  She would benefit from initiation of goals of care discussion and outpatient palliative follow-up.  She has not had formal evaluation by endocrinology for adrenal insufficiency.  Would benefit from endocrinology follow-up.      Patient and family would not like nursing home placement or SNF for rehab.  Home health orders placed.  Patient's stepdaughter Jaycee updated on plan of care.  She notes patient is unable to make it to appointments and is not compliant with medications at home.  She will assist with followed up with PCC.     Physical Exam  Vitals and nursing note reviewed.   Constitutional:       General: She is not in acute distress.     Appearance: She is obese. She is ill-appearing.   Cardiovascular:      Rate and Rhythm: Normal rate and regular rhythm.   Musculoskeletal:      Right lower leg: Edema (chronic lymphedema) present.      Left lower leg: Edema present.   Skin:     General: Skin is warm.      Comments: Bilateral lower extremities bandaged  Neurological:      Mental Status: She is alert, oriented to person, place, and time and easily aroused.   Psychiatric:         Mood and Affect: Mood normal.        Goals of Care Treatment Preferences:  Code Status: Full Code    Health care agent: Dangelo Hawley (spouse)  Health care agent number: [phone]    Living Will: Yes     What is most important right now is to focus on symptom/pain control, extending life as long as possible, even it it means sacrificing quality.  Accordingly, we have decided that the best plan to meet the patient's goals includes continuing with treatment.      Consults:   Consults (From admission, onward)        Status Ordering Provider     Inpatient consult to  Registered Dietitian/Nutritionist  Once        Provider:  (Not yet assigned)    Completed CHRISTINE SWANSON consult to case management  Once        Provider:  (Not yet assigned)    Completed NEAL BIGGS          Final Active Diagnoses:    Diagnosis Date Noted POA    PRINCIPAL PROBLEM:  Lymphedema [I89.0] 03/02/2017 Yes    Intractable pain [R52] 05/25/2023 Yes    Severe obesity (BMI 35.0-39.9) with comorbidity [E66.01] 05/22/2023 Yes    Adrenal insufficiency [E27.40] 01/17/2023 Yes    Debility [R53.81] 01/13/2021 Yes    Leg pain, bilateral [M79.604, M79.605] 02/06/2020 Yes    Suprapubic catheter [Z93.59] 02/01/2018 Not Applicable     Chronic    Hypothyroidism (acquired) [E03.9] 02/02/2017 Yes    Narcotic dependency, continuous [F11.20] 07/18/2014 Yes     Chronic    Sleep apnea [G47.30]  Yes      Problems Resolved During this Admission:    Diagnosis Date Noted Date Resolved POA    Chest pain [R07.9] 02/21/2019 05/27/2023 Yes       Discharged Condition: stable    Disposition: Home or Self Care    Follow Up:   Follow-up Information     Home Health Agency Follow up.    Why: Home Health Agency will contact patient to resume services.                     Patient Instructions:   No discharge procedures on file.    Significant Diagnostic Studies: Labs:   BMP:   Recent Labs   Lab 05/29/23  0444 05/30/23  0604    97    138   K 3.9 3.2*   CL 94* 96   CO2 30* 29   BUN 10 14   CREATININE 0.9 1.1   CALCIUM 9.8 10.2   MG 2.1 2.4    and CBC   Recent Labs   Lab 05/29/23  0444   WBC 5.31   HGB 10.6*   HCT 35.3*          Pending Diagnostic Studies:     None         Medications:  Reconciled Home Medications:      Medication List      START taking these medications    ferrous gluconate 324 MG tablet  Commonly known as: FERGON  Take 1 tablet (324 mg total) by mouth daily with breakfast.     pantoprazole 40 MG tablet  Commonly known as: PROTONIX  Take 1 tablet (40 mg total) by mouth once  daily.        CHANGE how you take these medications    atorvastatin 80 MG tablet  Commonly known as: LIPITOR  TAKE ONE TABLET BY MOUTH EVERY DAY  What changed: when to take this     furosemide 40 MG tablet  Commonly known as: LASIX  Take 1 tablet (40 mg total) by mouth 2 (two) times a day.  What changed:   · how much to take  · when to take this     * hydrocortisone 10 MG Tab  Commonly known as: CORTEF  Take 1 tab in the morning and 0.5 tab in the afternoon  What changed:   · how much to take  · how to take this  · when to take this  · additional instructions     * hydrocortisone 10 MG Tab  Commonly known as: CORTEF  Take 5 mg by mouth every evening. 1/2 tablet  What changed: Another medication with the same name was changed. Make sure you understand how and when to take each.     QUEtiapine 100 MG Tab  Commonly known as: SEROQUEL  TAKE 2 TABLETS (200 MG) BY MOUTH NIGHTLY  What changed:   · how much to take  · how to take this  · when to take this  · additional instructions         * This list has 2 medication(s) that are the same as other medications prescribed for you. Read the directions carefully, and ask your doctor or other care provider to review them with you.            CONTINUE taking these medications    albuterol-ipratropium 2.5 mg-0.5 mg/3 mL nebulizer solution  Commonly known as: DUO-NEB  Take 3 mLs by nebulization every 6 (six) hours as needed for Wheezing or Shortness of Breath. Rescue     aspirin 81 MG EC tablet  Commonly known as: ECOTRIN  Take 1 tablet (81 mg total) by mouth once daily.     cyanocobalamin (vitamin B-12) 5,000 mcg Subl  Place 1 tablet under the tongue once daily.     docusate sodium 100 MG capsule  Commonly known as: COLACE  Take 200 mg by mouth every evening.     fludrocortisone 0.1 mg Tab  Commonly known as: FLORINEF  Take 1 tablet (100 mcg total) by mouth once daily.     FLUoxetine 20 MG capsule  Take 3 capsules (60 mg total) by mouth once daily.     fluticasone propionate 50  mcg/actuation nasal spray  Commonly known as: FLONASE  2 sprays (100 mcg total) by Each Nostril route once daily.     HYDROcodone-acetaminophen  mg per tablet  Commonly known as: NORCO  Take 1 tablet by mouth every 6 (six) hours as needed for breakthrough pain.     hydrOXYzine 50 MG tablet  Commonly known as: ATARAX  Take 1 tablet (50 mg total) by mouth every 4 (four) hours as needed for Anxiety.     INTRATHECAL PAIN PUMP COMPOUND  Hydromorphone (7.5 mg/mL) infusion at 8.59 mg/day (0.3578 mg/hr) out of a total reservoir volume of 40 mL  Pump filled monthly     ketorolac 10 mg tablet  Commonly known as: TORADOL  Take 10 mg by mouth daily as needed.     levothyroxine 150 MCG tablet  Commonly known as: SYNTHROID  Take 1 tablet (150 mcg total) by mouth before breakfast.     LIDOcaine 5 %  Commonly known as: LIDODERM  Place 1 patch onto the skin once daily. Remove & Discard patch within 12 hours or as directed by MD     liothyronine 25 MCG Tab  Commonly known as: CYTOMEL  Take 1 tablet (25 mcg total) by mouth once daily.     nitroGLYCERIN 0.4 MG SL tablet  Commonly known as: NITROSTAT  Place 0.4 mg under the tongue every 5 (five) minutes as needed for Chest pain.     ondansetron 4 MG Tbdl  Commonly known as: ZOFRAN-ODT  Take 4 mg by mouth every 4 to 6 hours as needed.     potassium chloride SA 20 MEQ tablet  Commonly known as: K-DUR,KLOR-CON  Take 1 tablet (20 mEq total) by mouth once daily.     promethazine 25 MG tablet  Commonly known as: PHENERGAN  Take 25 mg by mouth every 6 (six) hours as needed for Nausea.     senna 8.6 mg tablet  Commonly known as: SENNA LAX  Take 2 tablets by mouth 2 (two) times daily.     SPIRIVA RESPIMAT 2.5 mcg/actuation inhaler  Generic drug: tiotropium bromide  Inhale 2 puffs into the lungs once daily. Controller     tiZANidine 4 MG tablet  Commonly known as: ZANAFLEX  Take 4 mg by mouth 2 (two) times daily as needed.     traZODone 300 MG tablet  Commonly known as: DESYREL  Take 1  tablet (300 mg total) by mouth every evening.        STOP taking these medications    butalbital-acetaminophen-caffeine -40 mg -40 mg per tablet  Commonly known as: FIORICET, ESGIC     MYRBETRIQ 50 mg Tb24  Generic drug: mirabegron            Indwelling Lines/Drains at time of discharge:   Lines/Drains/Airways     Drain  Duration                Suprapubic Catheter 12/13/22 100% silicone 20 Fr. 168 days          Line  Duration                Subcutaneous Infusion Pump Abdomen (Comment) -- days                Time spent on the discharge of patient: 38 minutes         Bharti Sullivan MD  Department of Hospital Medicine  [facility]

## 2023-05-30 NOTE — PLAN OF CARE
Pt will dc today with no needs noted. Pt sent HH orders via careport. Pt will resume HH services upon dc. Pt  will provide transport home. SW will continue to follow pt throughout her transitions of care and assist with any dc needs. Pt made aware that  must bring portable O2 to hospital upon dc. Pt expressed understanding.     Cleared from CM . Bedside Nurse and VN notified.    CarePort Alert: YES response from [facility] re: Referral [id number] for patient in [facility] [id number] A: Yes, willing to accept patient IF PATIENT WAS IN OBS WE CAN CONTINUE CARE      Future Appointments   Date Time Provider Department Center   6/5/2023 11:30 AM Imani Juarez MD [facility] IMPRI [place] [facility]   6/12/2023  1:30 PM Efe Hickey MD [facility] ENDODIA [address]   6/13/2023  1:30 PM Amanda Head NP [facility] HEM ONC [place] [facility]   6/22/2023 11:20 AM Mesfin Hodges II, MD [facility] IM [address] PCW   6/28/2023  8:00 AM Jose Ortiz MD [facility] PULMSVC [address]   6/30/2023 10:00 AM Kathryn Pittman MD [facility]   7/7/2023 11:00 AM Mesfin Hodges II, MD [facility] IM [address] PCW   7/24/2023  9:05 AM LAB, APPOINTMENT [facility] LAB VNP [facility]   7/31/2023 11:30 AM Prince Vance MD [facility] GASTRO [address]        05/30/23 0929   Final Note   Assessment Type Final Discharge Note   Anticipated Discharge Disposition [facility] Resources/Appts/Education Provided Appointments scheduled by Navigator/Coordinator   Post-Acute Status   Discharge Delays None known at this time

## 2023-05-30 NOTE — PT/OT/SLP PROGRESS
Physical Therapy Treatment    Patient Name:  Tasha Hawley   MRN:  [id number]    Recommendations:     Discharge Recommendations:  (moderate intensive therapy)  Discharge Equipment Recommendations:  (TBD)  Barriers to discharge:  decreased mobility,strength and endurance    Assessment:     Tasah Hawley is a 67 y.o. female admitted with a medical diagnosis of Lymphedema.  She presents with the following impairments/functional limitations: weakness, impaired endurance, impaired functional mobility, gait instability, impaired balance, decreased upper extremity function, decreased lower extremity function, pain, impaired coordination, impaired skin, impaired cardiopulmonary response to activity,pt with increased ability to participate today in therapy,pt will benefit from continuing PT services upon discharge, states pt will be going home with  services.    Rehab Prognosis: Fair; patient would benefit from acute skilled PT services to address these deficits and reach maximum level of function.    Recent Surgery: * No surgery found *      Plan:     During this hospitalization, patient to be seen 3 x/week to address the identified rehab impairments via gait training, therapeutic activities, therapeutic exercises, neuromuscular re-education and progress toward the following goals:    Plan of Care Expires:  06/26/23    Subjective     Chief Complaint: n/a  Patient/Family Comments/goals: pt states she will probably go home today.  Pain/Comfort:  Pain Rating 1:  (no rating)  Location - Side 1: Bilateral  Location 1:  (legs and feet)  Pain Addressed 1: Reposition, Distraction, Cessation of Activity      Objective:     Communicated with nsg prior to session.  Patient found supine with bed alarm, meadows catheter, oxygen, peripheral IV upon PT entry to room.     General Precautions: Standard, fall  Orthopedic Precautions: N/A  Braces: N/A  Respiratory Status: Nasal cannula, flow 4 L/min     Functional Mobility:  Bed  Mobility:     Supine to Sit: supervision  Transfers:     Sit to Stand:  contact guard assistance and X 3 trials with rolling walker  Bed to Chair: minimum assistance with  rolling walker  using  Step Transfer  Gait: amb ~5' up/back X 2 trials with Min A  Balance: fair standing balance with RW      AM-PAC 6 CLICK MOBILITY  Turning over in bed (including adjusting bedclothes, sheets and blankets)?: 3  Sitting down on and standing up from a chair with arms (e.g., wheelchair, bedside commode, etc.): 3  Moving from lying on back to sitting on the side of the bed?: 3  Moving to and from a bed to a chair (including a wheelchair)?: 3  Need to walk in hospital room?: 3  Climbing 3-5 steps with a railing?: 1  Basic Mobility Total Score: 16       Treatment & Education: le supine ex's X 10-12 reps inc: ap,qs,hs,abd/add      Patient left up in chair with all lines intact, call button in reach, chair alarm on, and MD present..    GOALS: see general POC  Multidisciplinary Problems       Physical Therapy Goals          Problem: Physical Therapy    Goal Priority Disciplines Outcome Goal Variances Interventions   Physical Therapy Goal     PT, PT/OT Ongoing, Progressing     Description: Goals to be met by: 2023     Patient will increase functional independence with mobility by performin. Supine to sit with Stand-by Assistance  2. Sit to supine with Contact Guard Assistance  3. Sit to stand transfer with Stand-by Assistance using Rolling Walker  4. Bed to chair transfer with Stand-by Assistance using Rolling Walker  5. Gait  x 50 feet with Stand-by Assistance using Rolling Walker.   6. Lower extremity exercise program x10 reps with supervision                         Time Tracking:     PT Received On: 23  PT Start Time: 908     PT Stop Time: 934  PT Total Time (min): 26 min     Billable Minutes: Gait Training 15 and Therapeutic Exercise 11    Treatment Type: Treatment  PT/PTA: PTA     Number of PTA visits since last  PT visit: 2     05/30/2023

## 2023-05-30 NOTE — PLAN OF CARE
VN reviewed discharge instructions with pt.'s daughter antonio, via telephone per pts request.  Using teach back method.  AVS printed and handed to pt by bedside nurse.  Reviewed follow-up appointments, medications, diet, and importance of medication compliance.  Reviewed home care instructions, treatment plan, self-management, and when to seek medical attention.  Allowed time for questions.  All questions answered.  Patient's daughter antonio verbalized complete understanding of discharge instructions and voices no concerns. Pts daughter requested VN to review heart healthy diet with pt. VN cued into pt rooms and reviewed appointments, medications , and diet with patient. Verbalized understanding.     Discharge instructions complete.  Bedside delivery done. Pt waiting on ride home. .  Bedside nurse notified.

## 2023-05-30 NOTE — PLAN OF CARE
[facility]      HOME HEALTH ORDERS  FACE TO FACE ENCOUNTER    Patient Name: Tasha Hawley  YOB: 1955    PCP: Mesfin Hodges Ii, MD   PCP Address: [address] / [place] [address]  PCP Phone Number: [phone]  PCP Fax: [phone]    Encounter Date: 5/24/23    Admit to Home Health    Diagnoses:  Active Hospital Problems    Diagnosis  POA    *Lymphedema [I89.0]  Yes    Intractable pain [R52]  Yes    Severe obesity (BMI 35.0-39.9) with comorbidity [E66.01]  Yes    Adrenal insufficiency [E27.40]  Yes    Debility [R53.81]  Yes    Leg pain, bilateral [M79.604, M79.605]  Yes    Suprapubic catheter [Z93.59]  Not Applicable     Chronic    Hypothyroidism (acquired) [E03.9]  Yes    Narcotic dependency, continuous [F11.20]  Yes     Chronic    Sleep apnea [G47.30]  Yes      Resolved Hospital Problems    Diagnosis Date Resolved POA    Chest pain [R07.9] 05/27/2023 Yes       Follow Up Appointments:  Future Appointments   Date Time Provider Department Center   6/8/2023 11:30 AM Imani Juarez MD [facility] IMPRI [facility]   6/12/2023  1:30 PM Efe Hickey MD [facility] ENDODIA [facility]   6/13/2023  1:30 PM Amanda Head NP [facility] HEM ONC [facility]   6/22/2023 11:20 AM Mesfin Hodges II, MD [facility] PCW   6/28/2023  8:00 AM Jose Ortiz MD [facility] PULMSVC [facility]   6/30/2023 10:00 AM Kathryn Pittman MD [facility] ALLERG [place]   7/7/2023 11:00 AM Mesfin Hodges II, MD [facility] PC[facility]   7/24/2023  9:05 AM LAB, APPOINTMENT [place] [facility] LAB VNP [facility]   7/31/2023 11:30 AM Prince Vance MD [facility] GASTRO [facility]       Allergies:  Review of patient's allergies indicates:   Allergen Reactions    (d)-limonene flavor      Other reaction(s): difficult intubation  Other reaction(s): Difficulty breathing    Bactrim [sulfamethoxazole-trimethoprim] Anaphylaxis    Benadryl [diphenhydramine hcl] Shortness Of Breath    Fentanyl Itching, Nausea And Vomiting and Swelling              Imitrex [sumatriptan succinate] Shortness Of Breath    Percocet [oxycodone-acetaminophen] Shortness Of Breath    Topamax [topiramate] Shortness Of Breath    Vancomycin Anaphylaxis     Rash    Butorphanol tartrate     Darvocet a500 [propoxyphene n-acetaminophen]      Other reaction(s): Difficulty breathing    Evening primrose (oenothera biennis)     Lyrica [pregabalin] Other (See Comments)     tremors    White petrolatum-zinc     Zinc oxide-white petrolatum      Other reaction(s): Difficulty breathing    Latex, natural rubber Itching and Rash    Phenytoin Rash and Other (See Comments)     Trouble breathing       Medications: Review discharge medications with patient and family and provide education.    Current Facility-Administered Medications   Medication Dose Route Frequency Provider Last Rate Last Admin    acetaminophen tablet 650 mg  650 mg Oral Q6H PRN Bharti Sullivan MD   650 mg at 05/29/23 1725    albuterol-ipratropium 2.5 mg-0.5 mg/3 mL nebulizer solution 3 mL  3 mL Nebulization Q6H PRN Lisa Carrasco, SHONDA        aspirin EC tablet 81 mg  81 mg Oral Daily Lisa Carrasco NP   81 mg at 05/30/23 0823    atorvastatin tablet 80 mg  80 mg Oral Daily Lisa Carrasco, NP   80 mg at 05/30/23 0822    dextrose 10% bolus 125 mL 125 mL  12.5 g Intravenous PRN Lisa Carrasco NP        dextrose 10% bolus 250 mL 250 mL  25 g Intravenous PRN Lisa Carrasco NP        dextrose 40 % gel 15,000 mg  15 g Oral PRN Lisa Carrasco NP        dextrose 40 % gel 30,000 mg  30 g Oral PRN Lisa Carrasco NP        docusate sodium capsule 200 mg  200 mg Oral QHS Lisa Carrasco NP   200 mg at 05/29/23 2158    enoxaparin injection 40 mg  40 mg Subcutaneous Daily Lisa Carrasco NP   40 mg at 05/29/23 1724    famotidine tablet 20 mg  20 mg Oral BID Nic Mistry MD   20 mg at 05/30/23 0825    ferrous sulfate tablet 1 each  1 tablet Oral Daily Nicolette Rubio NP   1 each at 05/30/23 0822    fludrocortisone tablet 100 mcg  100 mcg Oral Daily Bharti  FIONA Sullivan MD   100 mcg at 05/30/23 0823    FLUoxetine capsule 60 mg  60 mg Oral Daily Lisa Carrasco, NP   60 mg at 05/30/23 0822    glucagon (human recombinant) injection 1 mg  1 mg Intramuscular PRN Lisa Carrasco, SHONDA        HYDROcodone-acetaminophen  mg per tablet 1 tablet  1 tablet Oral Q6H PRN Lisa Carrasco, NP   1 tablet at 05/29/23 0438    hydrocortisone tablet 10 mg  10 mg Oral QAM Lisa Carrasco, NP   10 mg at 05/30/23 0609    hydrocortisone tablet 5 mg  5 mg Oral QHS Lisa Carrasco, NP   5 mg at 05/29/23 2158    hydrOXYzine HCL tablet 50 mg  50 mg Oral Q4H PRN Lisa Carrasco, NP        INTRATHECAL PAIN PUMP COMPOUND   Intrathecal Continuous Lisa Carrasco, NP        levothyroxine tablet 150 mcg  150 mcg Oral Before breakfast Lisa Carrasco, NP   150 mcg at 05/30/23 0609    LIDOcaine 5 % patch 1 patch  1 patch Transdermal Daily Lisa Carrasco, NP   1 patch at 05/30/23 0823    liothyronine tablet 25 mcg  25 mcg Oral Daily Lisa Carrasco, NP   25 mcg at 05/30/23 0822    melatonin tablet 6 mg  6 mg Oral Nightly PRN Lisa Carrasco, NP        naloxone 0.4 mg/mL injection 0.02 mg  0.02 mg Intravenous PRN Lisa Carrasco, NP        nitroGLYCERIN SL tablet 0.4 mg  0.4 mg Sublingual Q5 Min PRN Lisa Carrasco, NP        ondansetron disintegrating tablet 8 mg  8 mg Oral Q8H PRN Lisa Carrasco, NP        ondansetron injection 4 mg  4 mg Intravenous Q8H PRN Lisa Carrasco, NP        oxybutynin 24 hr tablet 10 mg  10 mg Oral Daily Lisa Carrasco, NP   10 mg at 05/30/23 0822    potassium bicarbonate disintegrating tablet 40 mEq  40 mEq Oral Q4H Bharti Sullivan MD   40 mEq at 05/30/23 1004    QUEtiapine tablet 200 mg  200 mg Oral Nightly Lisa Carrasco, NP   200 mg at 05/29/23 2157    senna tablet 2 tablet  2 tablet Oral BID Lisa Carrasco, NP   2 tablet at 05/30/23 0823    sodium chloride 0.9% flush 3 mL  3 mL Intravenous Q12H PRN Lisa Carrasco, SHONDA        tiotropium bromide 2.5 mcg/actuation inhaler 2 puff  2 puff Inhalation Daily Lisa Carrasco, NP   2  punatanael at 05/30/23 0758    tiZANidine tablet 4 mg  4 mg Oral BID PRN Lisa Carrasco, SHONDA        traZODone tablet 300 mg  300 mg Oral QHS Lisa Carrasco NP   300 mg at 05/29/23 2158     Current Discharge Medication List        CONTINUE these medications which have CHANGED    Details   fludrocortisone (FLORINEF) 0.1 mg Tab Take 1 tablet (100 mcg total) by mouth once daily.  Qty: 90 tablet, Refills: 2      furosemide (LASIX) 40 MG tablet Take 1 tablet (40 mg total) by mouth 2 (two) times a day.  Qty: 90 tablet, Refills: 3      pantoprazole (PROTONIX) 40 MG tablet Take 1 tablet (40 mg total) by mouth once daily.  Qty: 90 tablet, Refills: 3    Associated Diagnoses: Gastroesophageal reflux disease, unspecified whether esophagitis present      potassium chloride SA (K-DUR,KLOR-CON) 20 MEQ tablet Take 1 tablet (20 mEq total) by mouth once daily.  Qty: 15 tablet, Refills: 0    Associated Diagnoses: Congestive heart failure, unspecified HF chronicity, unspecified heart failure type           CONTINUE these medications which have NOT CHANGED    Details   albuterol-ipratropium (DUO-NEB) 2.5 mg-0.5 mg/3 mL nebulizer solution Take 3 mLs by nebulization every 6 (six) hours as needed for Wheezing or Shortness of Breath. Rescue  Qty: 90 mL, Refills: 0      aspirin (ECOTRIN) 81 MG EC tablet Take 1 tablet (81 mg total) by mouth once daily.  Qty: 90 tablet, Refills: 3    Associated Diagnoses: Chronic pain syndrome      cyanocobalamin, vitamin B-12, 5,000 mcg Subl Place 1 tablet under the tongue once daily.      docusate sodium (COLACE) 100 MG capsule Take 200 mg by mouth every evening.      FLUoxetine 20 MG capsule Take 3 capsules (60 mg total) by mouth once daily.  Qty: 270 capsule, Refills: 3    Associated Diagnoses: Anxiety      fluticasone propionate (FLONASE) 50 mcg/actuation nasal spray 2 sprays (100 mcg total) by Each Nostril route once daily.  Qty: 16 g, Refills: 0      HYDROcodone-acetaminophen (NORCO)  mg per tablet Take 1  tablet by mouth every 6 (six) hours as needed for breakthrough pain.      !! hydrocortisone (CORTEF) 10 MG Tab Take 1 tab in the morning and 0.5 tab in the afternoon  Qty: 135 tablet, Refills: 3      !! hydrocortisone (CORTEF) 10 MG Tab Take 5 mg by mouth every evening. 1/2 tablet      hydrOXYzine (ATARAX) 50 MG tablet Take 1 tablet (50 mg total) by mouth every 4 (four) hours as needed for Anxiety.  Qty: 30 tablet, Refills: 0      intrathecal pain pump compound Hydromorphone (7.5 mg/mL) infusion at 8.59 mg/day (0.3578 mg/hr) out of a total reservoir volume of 40 mL  Pump filled monthly      levothyroxine (SYNTHROID) 150 MCG tablet Take 1 tablet (150 mcg total) by mouth before breakfast.  Qty: 30 tablet, Refills: 11      LIDOcaine (LIDODERM) 5 % Place 1 patch onto the skin once daily. Remove & Discard patch within 12 hours or as directed by MD  Refills: 0      liothyronine (CYTOMEL) 25 MCG Tab Take 1 tablet (25 mcg total) by mouth once daily.  Qty: 30 tablet, Refills: 11      ondansetron (ZOFRAN-ODT) 4 MG TbDL Take 4 mg by mouth every 4 to 6 hours as needed.      promethazine (PHENERGAN) 25 MG tablet Take 25 mg by mouth every 6 (six) hours as needed for Nausea.      QUEtiapine (SEROQUEL) 100 MG Tab TAKE 2 TABLETS (200 MG) BY MOUTH NIGHTLY  Qty: 180 tablet, Refills: 3    Associated Diagnoses: Insomnia due to medical condition      senna (SENNA LAX) 8.6 mg tablet Take 2 tablets by mouth 2 (two) times daily.      tiotropium bromide (SPIRIVA RESPIMAT) 2.5 mcg/actuation inhaler Inhale 2 puffs into the lungs once daily. Controller  Qty: 4 g, Refills: 0      traZODone (DESYREL) 300 MG tablet Take 1 tablet (300 mg total) by mouth every evening.  Qty: 90 tablet, Refills: 0    Associated Diagnoses: Insomnia due to medical condition      atorvastatin (LIPITOR) 80 MG tablet TAKE ONE TABLET BY MOUTH EVERY DAY  Qty: 90 tablet, Refills: 3    Associated Diagnoses: Mixed hyperlipidemia      ferrous gluconate (FERGON) 324 MG tablet  Take 1 tablet (324 mg total) by mouth daily with breakfast.  Qty: 90 tablet, Refills: 1    Associated Diagnoses: Iron deficiency anemia, unspecified iron deficiency anemia type      ketorolac (TORADOL) 10 mg tablet Take 10 mg by mouth daily as needed.      nitroGLYCERIN (NITROSTAT) 0.4 MG SL tablet Place 0.4 mg under the tongue every 5 (five) minutes as needed for Chest pain.      tiZANidine (ZANAFLEX) 4 MG tablet Take 4 mg by mouth 2 (two) times daily as needed.       !! - Potential duplicate medications found. Please discuss with provider.        STOP taking these medications       butalbital-acetaminophen-caffeine -40 mg (FIORICET, ESGIC) -40 mg per tablet Comments:   Reason for Stopping:         mirabegron (MYRBETRIQ) 50 mg Tb24 Comments:   Reason for Stopping:                 I have seen and examined this patient within the last 30 days. My clinical findings that support the need for the home health skilled services and home bound status are the following:no   Weakness/numbness causing balance and gait disturbance due to Heart Failure and Weakness/Debility making it taxing to leave home.  Requiring assistive device to leave home due to unsteady gait caused by  Heart Failure and Weakness/Debility.     Diet:   cardiac diet    Referrals/ Consults  Physical Therapy to evaluate and treat. Evaluate for home safety and equipment needs; Establish/upgrade home exercise program. Perform / instruct on therapeutic exercises, gait training, transfer training, and Range of Motion.  Occupational Therapy to evaluate and treat. Evaluate home environment for safety and equipment needs. Perform/Instruct on transfers, ADL training, ROM, and therapeutic exercises.    Activities:   activity as tolerated    Nursing:   Agency to admit patient within 24 hours of hospital discharge unless specified on physician order or at patient request    SN to complete comprehensive assessment including routine vital signs. Instruct on  disease process and s/s of complications to report to MD. Review/verify medication list sent home with the patient at time of discharge  and instruct patient/caregiver as needed. Frequency may be adjusted depending on start of care date.     Skilled nurse to perform up to 3 visits PRN for symptoms related to diagnosis    Notify MD if SBP > 160 or < 90; DBP > 90 or < 50; HR > 120 or < 50; Temp > 101; O2 < 88%    Ok to schedule additional visits based on staff availability and patient request on consecutive days within the home health episode.    When multiple disciplines ordered:    Start of Care occurs on Sunday - Wednesday schedule remaining discipline evaluations as ordered on separate consecutive days following the start of care.    Thursday SOC -schedule subsequent evaluations Friday and Monday the following week.     Friday - Saturday SOC - schedule subsequent discipline evaluations on consecutive days starting Monday of the following week.    For all post-discharge communication and subsequent orders please contact patient's primary care physician.     Miscellaneous   Home Oxygen:  Oxygen at 3-4 L/min nasal canula to be used:  Continuously.    Home Health Aide:  Nursing Three times weekly, Physical Therapy Three times weekly, and Occupational Therapy Three times weekly    Wound Care Orders  Nursing to cleanse BLE with Vashe wound cleanser and apply Xeroform dressings to weeping areas over BLE. Cover with abd pads/Kerlix/ace.  Elevate BLE.  Change dressing daily.    I certify that this patient is confined to her home and needs intermittent skilled nursing care, physical therapy, and occupational therapy.

## 2023-06-01 ENCOUNTER — TELEPHONE (OUTPATIENT)
Dept: PRIMARY CARE CLINIC | Facility: CLINIC | Age: 68
End: 2023-06-01
Payer: MEDICARE

## 2023-06-01 ENCOUNTER — OUTPATIENT CASE MANAGEMENT (OUTPATIENT)
Dept: ADMINISTRATIVE | Facility: OTHER | Age: 68
End: 2023-06-01
Payer: MEDICARE

## 2023-06-01 NOTE — PROGRESS NOTES
Outpatient Care Management  Plan of Care Follow Up Visit    Patient: Tasha Hawley  MRN: [id number]  Date of Service: 06/01/2023  Completed by: Michelle Box RN  Referral Date: 04/17/2023    Reason for Visit   Patient presents with    OPCM RN Follow Up Call       Brief Summary: Patient was hospitalized from 5/24-5/30 for CHF.  She had increased swelling in her legs with weeping and pain.  She has chronic SOB and was on 4L/NC of home O2 at the time.  She was diuresed with IV Lasix and transitioned to PO Lasix.  She declined SNF and was sent home with [facility] .  She has daily dressing changes on her legs bilaterally.  She states she currently feels better because they got so much fluid off of her.  She states she weighed 240 lbs at one point.  She states she does not weigh herself daily because she didn't like how big she was so she stopped.  Her last weight on record is 207 on 5/30.  She states she is taking her Lasix as prescribed.  Patient states they do not cook with salt, she uses Mrs [employer].  She states she is not using salt at all.  She eats things like baked potatoes, a small piece of protein like chicken and a vegetable.  She states she eats sausage occasionally.     CM reiterated to patient the importance of weighing herself and comparing the weight to the day before and the week before.  CM informed patient about the 3 lb/5 lb rule of notifying the doctor. She has a hospital follow up appointment with  Dr. Imani Juarez in  on 6/8/2023 at 11:30 AM.  CM reviewed a low sodium diet with patient discussing with her that processed meats contain high amounts of sodium in them.  CM informed patient that sausage is a processed meat as well as hotdogs, begum, sandwich meats, canned meats like chicken and tuna.  CM encouraged patient to stay away from these types of meats and to stick with fresh cut meats like chicken, pork chops or steak.  Patient verbalized understanding and agrees to follow up phone  call in three weeks.    Next steps:   Follow up phone call in two weeks  Follow up if weighing herself daily and logging  Follow up on 3 lb/5 lb rule  Follow up if had to notify the doctor  Follow up if eating processed meats  Follow up if received educational materials  Follow up on low sodium diet  Educate on S&S of CHF

## 2023-06-01 NOTE — TELEPHONE ENCOUNTER
Patient still symptomatic from yeast in the urine.  Her catheter was changed at the time of the collection.    no

## 2023-06-01 NOTE — TELEPHONE ENCOUNTER
PCC nurse called to remind patient of PCC appt on 6/8 at 11:30am. Pt reminded to bring all medication bottles to appointment. Pt verbalized understanding.

## 2023-06-06 ENCOUNTER — TELEPHONE (OUTPATIENT)
Dept: UROLOGY | Facility: CLINIC | Age: 68
End: 2023-06-06
Payer: MEDICARE

## 2023-06-06 ENCOUNTER — DOCUMENT SCAN (OUTPATIENT)
Dept: HOME HEALTH SERVICES | Facility: HOSPITAL | Age: 68
End: 2023-06-06
Payer: MEDICARE

## 2023-06-06 NOTE — TELEPHONE ENCOUNTER
----- Message from Kristen Bray LPN sent at 6/5/2023  3:54 PM CDT -----  Regarding: FW: kym  Contact: @[phone]    ----- Message -----  From: Betito Nicholas  Sent: 6/5/2023   1:46 PM CDT  To: [facility] Staff  Subject: kym                                              Pt calling to speak to dr fonseca call and [phone]

## 2023-06-07 ENCOUNTER — TELEPHONE (OUTPATIENT)
Dept: ENDOSCOPY | Facility: HOSPITAL | Age: 68
End: 2023-06-07
Payer: MEDICARE

## 2023-06-07 NOTE — TELEPHONE ENCOUNTER
----- Message from Prince Vance MD sent at 6/7/2023  3:40 PM CDT -----  Regarding: RE: Please call  Contact: [phone] or cell [phone]  Actually Dahlia she needs her follow-up after she completes her endoscopies per my last clinic note which was not very long ago see below    Assessment:    1. Iron deficiency anemia, unspecified iron deficiency anemia type   2.      Dysphagia with nausea vomiting   3.      Iron deficiency anemia  4.      Home oxygen dependent  5.      BMI 39     Recommendations:  1. Lab work today  2. Urgent EGD 2nd floor for cardiopulmonary protection for dysphagia nausea vomiting and anemia evaluation  3. Patient has again declined colonoscopy she says not interested.    4. Return GI clinic 8 weeks for follow-up  Follow up in about 8 weeks (around 7/12/2023).        Order summary:    Orders Placed This Encounter  · Hemoglobin  · Ferritin  · Iron and TIBC  · TISSUE TRANSGLUTAMINASE (TTG), IGA  · IgA  · Lipase           Thank you so much for allowing me to participate in the care of Tasha Hawley     Prince Vance MD     DISCLAIMER: This note was prepared with [employer] voice recognition transcription software. Garbled syntax, mangled or inadvertent pronouns, and other bizarre constructions may be attributed to that software system. While efforts were made to correct any mistakes made by this voice recognition program, some errors and/or omissions may remain in the note that were missed when the note was originally created.          Electronically signed by Prince Vance MD at 5/17/2023      ----- Message -----  From: Parvin Lara MA  Sent: 6/7/2023  10:45 AM CDT  To: Prince Vance MD  Subject: FW: Please call                                  When do you want to work in?  ----- Message -----  From: Jazmin Vazquez CMA  Sent: 6/7/2023  10:33 AM CDT  To: Pinky Weber  Subject: Please call                                      Hi,    Pt states she was inpatient d/c  6/1/23. She would like to f/u with Dr. Pinky garibay. Pt states she has called several times     Please call the pt regarding procedure     Thank you

## 2023-06-08 ENCOUNTER — OFFICE VISIT (OUTPATIENT)
Dept: PRIMARY CARE CLINIC | Facility: CLINIC | Age: 68
End: 2023-06-08
Payer: MEDICARE

## 2023-06-08 ENCOUNTER — TELEPHONE (OUTPATIENT)
Dept: ENDOSCOPY | Facility: HOSPITAL | Age: 68
End: 2023-06-08

## 2023-06-08 ENCOUNTER — PATIENT MESSAGE (OUTPATIENT)
Dept: PRIMARY CARE CLINIC | Facility: CLINIC | Age: 68
End: 2023-06-08

## 2023-06-08 VITALS
WEIGHT: 210.75 LBS | OXYGEN SATURATION: 90 % | SYSTOLIC BLOOD PRESSURE: 92 MMHG | HEART RATE: 61 BPM | TEMPERATURE: 97 F | DIASTOLIC BLOOD PRESSURE: 59 MMHG | BODY MASS INDEX: 35.07 KG/M2

## 2023-06-08 DIAGNOSIS — E87.6 HYPOKALEMIA: ICD-10-CM

## 2023-06-08 DIAGNOSIS — I89.0 LYMPHEDEMA: Primary | ICD-10-CM

## 2023-06-08 DIAGNOSIS — E78.2 MIXED HYPERLIPIDEMIA: ICD-10-CM

## 2023-06-08 PROBLEM — J18.9 MULTIFOCAL PNEUMONIA: Status: RESOLVED | Noted: 2023-04-28 | Resolved: 2023-06-08

## 2023-06-08 PROBLEM — I95.9 HYPOTENSION: Status: RESOLVED | Noted: 2020-05-16 | Resolved: 2023-06-08

## 2023-06-08 PROBLEM — K56.600 PARTIAL SMALL BOWEL OBSTRUCTION: Status: RESOLVED | Noted: 2020-01-27 | Resolved: 2023-06-08

## 2023-06-08 PROCEDURE — 1160F RVW MEDS BY RX/DR IN RCRD: CPT | Mod: CPTII,S$GLB,, | Performed by: INTERNAL MEDICINE

## 2023-06-08 PROCEDURE — 1125F AMNT PAIN NOTED PAIN PRSNT: CPT | Mod: CPTII,S$GLB,, | Performed by: INTERNAL MEDICINE

## 2023-06-08 PROCEDURE — 99999 PR PBB SHADOW E&M-EST. PATIENT-LVL V: CPT | Mod: PBBFAC,,, | Performed by: INTERNAL MEDICINE

## 2023-06-08 PROCEDURE — 3008F PR BODY MASS INDEX (BMI) DOCUMENTED: ICD-10-PCS | Mod: CPTII,S$GLB,, | Performed by: INTERNAL MEDICINE

## 2023-06-08 PROCEDURE — 1125F PR PAIN SEVERITY QUANTIFIED, PAIN PRESENT: ICD-10-PCS | Mod: CPTII,S$GLB,, | Performed by: INTERNAL MEDICINE

## 2023-06-08 PROCEDURE — 1101F PT FALLS ASSESS-DOCD LE1/YR: CPT | Mod: CPTII,S$GLB,, | Performed by: INTERNAL MEDICINE

## 2023-06-08 PROCEDURE — 3074F SYST BP LT 130 MM HG: CPT | Mod: CPTII,S$GLB,, | Performed by: INTERNAL MEDICINE

## 2023-06-08 PROCEDURE — 3008F BODY MASS INDEX DOCD: CPT | Mod: CPTII,S$GLB,, | Performed by: INTERNAL MEDICINE

## 2023-06-08 PROCEDURE — 1159F MED LIST DOCD IN RCRD: CPT | Mod: CPTII,S$GLB,, | Performed by: INTERNAL MEDICINE

## 2023-06-08 PROCEDURE — 99214 PR OFFICE/OUTPT VISIT, EST, LEVL IV, 30-39 MIN: ICD-10-PCS | Mod: S$GLB,,, | Performed by: INTERNAL MEDICINE

## 2023-06-08 PROCEDURE — 3074F PR MOST RECENT SYSTOLIC BLOOD PRESSURE < 130 MM HG: ICD-10-PCS | Mod: CPTII,S$GLB,, | Performed by: INTERNAL MEDICINE

## 2023-06-08 PROCEDURE — 3288F PR FALLS RISK ASSESSMENT DOCUMENTED: ICD-10-PCS | Mod: CPTII,S$GLB,, | Performed by: INTERNAL MEDICINE

## 2023-06-08 PROCEDURE — 3288F FALL RISK ASSESSMENT DOCD: CPT | Mod: CPTII,S$GLB,, | Performed by: INTERNAL MEDICINE

## 2023-06-08 PROCEDURE — 99999 PR PBB SHADOW E&M-EST. PATIENT-LVL V: ICD-10-PCS | Mod: PBBFAC,,, | Performed by: INTERNAL MEDICINE

## 2023-06-08 PROCEDURE — 1101F PR PT FALLS ASSESS DOC 0-1 FALLS W/OUT INJ PAST YR: ICD-10-PCS | Mod: CPTII,S$GLB,, | Performed by: INTERNAL MEDICINE

## 2023-06-08 PROCEDURE — 1160F PR REVIEW ALL MEDS BY PRESCRIBER/CLIN PHARMACIST DOCUMENTED: ICD-10-PCS | Mod: CPTII,S$GLB,, | Performed by: INTERNAL MEDICINE

## 2023-06-08 PROCEDURE — 3078F DIAST BP <80 MM HG: CPT | Mod: CPTII,S$GLB,, | Performed by: INTERNAL MEDICINE

## 2023-06-08 PROCEDURE — 99214 OFFICE O/P EST MOD 30 MIN: CPT | Mod: S$GLB,,, | Performed by: INTERNAL MEDICINE

## 2023-06-08 PROCEDURE — 1159F PR MEDICATION LIST DOCUMENTED IN MEDICAL RECORD: ICD-10-PCS | Mod: CPTII,S$GLB,, | Performed by: INTERNAL MEDICINE

## 2023-06-08 PROCEDURE — 3078F PR MOST RECENT DIASTOLIC BLOOD PRESSURE < 80 MM HG: ICD-10-PCS | Mod: CPTII,S$GLB,, | Performed by: INTERNAL MEDICINE

## 2023-06-08 RX ORDER — POTASSIUM CHLORIDE 750 MG/1
20 CAPSULE, EXTENDED RELEASE ORAL DAILY
Qty: 60 CAPSULE | Refills: 11 | Status: SHIPPED | OUTPATIENT
Start: 2023-06-08

## 2023-06-08 RX ORDER — POTASSIUM CHLORIDE 750 MG/1
20 TABLET, EXTENDED RELEASE ORAL DAILY
Qty: 60 TABLET | Refills: 3 | Status: SHIPPED | OUTPATIENT
Start: 2023-06-08 | End: 2023-06-08

## 2023-06-08 RX ORDER — ATORVASTATIN CALCIUM 80 MG/1
80 TABLET, FILM COATED ORAL DAILY
Qty: 90 TABLET | Refills: 3 | Status: SHIPPED | OUTPATIENT
Start: 2023-06-08

## 2023-06-08 NOTE — TELEPHONE ENCOUNTER
No care due was identified.  [facility] Embedded Care Due Messages. Reference number: [id number].   6/08/2023 4:00:57 PM CDT

## 2023-06-08 NOTE — PROGRESS NOTES
[facility]   New Visit Progress Note   Recent Hospital Discharge     PRESENTING HISTORY     Chief Complaint/Reason for Admission:  Follow up Hospital Discharge   PCP: Mesfin Hodges Ii, MD    History of Present Illness:  Ms. Tasha Hawley is a 67 y.o. female who was recently admitted to the hospital.  [facility]  Discharge Summary        Patient Name: Tasha Hawley  MRN: [id number]  LUH: [id number]  Patient Class: OP- Observation  Admission Date: 5/24/2023  Hospital Length of Stay: 0 days  Discharge Date and Time: 5/30/2023  1:05 PM  Attending Physician: Winifred att. providers found   Discharging Provider: Bharti Sullivan MD  Primary Care Provider: Mesfin Hodges Ii, MD  ___________________________________________________________________    Today:  [facility] up.  Recently hospitalized for management of acute on chronic bilateral LE edema.  Patient with known lymphedema, poor compliance with follow up and outpatient medication regimen.  Admitted to [facility] service.  Bilateral LE US without evidence of DVT.  Diuresed aggressively with IV Lasix with improvement in edema.   Patient deferred SNF or intermediate nursing home placement.  [facility] to resume care upon discharge.    Patient with complicated medical history including chronic pain with long-term opioid use, recurrent UTIs, lymphedema, debility, chronic diastolic HF, neurogenic bladder w/ suprapubic catheter, depression, and anxiety.     Patient accompanied today by her daughter who is helping to coordinate her care.  Daughter brought all medication bottles for review and has organized a pill divider from which patient can self administer her meds.   Daughter reports this has improved medication compliance and patient has missed perhaps one or two doses of medication which is a great improvement from prior.   Patient is ambulatory with [facility].    Supplemental oxygen dependent at baseline- 4 L NC but not wearing portable oxygen today- she didn't bring this with her due to feeling she didn't need it.   Legs are both in compression wrappings performed by home health team.  Daughter tells me there are open weeping wounds on both lower extremities- present x several weeks.  Still with significantly LE swelling, but greatly improved from prior per patient and family reports.     Daughter has updated Epic contact list so that her cell is primary number as she is coordinating all care and provides transportation.     Urgent ED planned by GI team per 5/17/23 GI note- awaiting EGD scheduling.   Pain Management- Dr Nigel Hillman- outside records not immediately available for review.     Review of Systems  General ROS: negative for chills, fever or weight loss  Psychological ROS: negative for hallucination, depression or suicidal ideation  Ophthalmic ROS: negative for blurry vision, photophobia or eye pain  ENT ROS: negative for epistaxis, sore throat or rhinorrhea  Respiratory ROS: + cough productive of thick sputum + shortness of breath, no wheezing  Cardiovascular ROS: no chest pain or dyspnea on exertion  Gastrointestinal ROS: no abdominal pain, change in bowel habits, or black/ bloody stools + difficulty swallowing liquids and solids   Genito-Urinary ROS: + neurogenic bladder   Musculoskeletal ROS: + gait disturbance , generalized muscular weakness  + bilateral LE swelling   Neurological ROS: no syncope or seizures; no ataxia  Dermatological ROS: negative for pruritis, rash and jaundice      PAST HISTORY:     Past Medical History:   Diagnosis Date    Anticoagulant long-term use     Anxiety     Arthritis     Bilateral lower extremity edema     severe chronic    Carotid artery occlusion     Cataract     CHF (congestive heart failure)     Coronary artery disease     subtotalled LAD with collateral    Depression     Fever blister     Hard of hearing     Hypokalemia  1/9/2023    Hyponatremia 2/4/2022    Hypothyroid     Iron deficiency anemia     Lumbar radiculopathy     with chronic pain    Ocular migraine     Other emphysema 5/22/2023    Renal disorder     Sleep apnea     cpap       Past Surgical History:   Procedure Laterality Date    ADENOIDECTOMY      BACK SURGERY      x 12    CARDIAC CATHETERIZATION  2016    subtotalled LAD with right to left collaterals    CATARACT EXTRACTION W/  INTRAOCULAR LENS IMPLANT Left     Dr Coleman     CYSTOSCOPIC LITHOLAPAXY N/A 6/27/2019    Procedure: CYSTOLITHOLAPAXY;  Surgeon: Shireen Mayo MD;  Location: [facility] OR 2ND FLR;  Service: Urology;  Laterality: N/A;    CYSTOSCOPIC LITHOLAPAXY N/A 9/3/2019    Procedure: CYSTOLITHOLAPAXY;  Surgeon: Shireen Mayo MD;  Location: [facility] OR 2ND FLR;  Service: Urology;  Laterality: N/A;    CYSTOSCOPY N/A 7/13/2021    Procedure: CYSTOSCOPY;  Surgeon: Shireen Mayo MD;  Location: [facility] OR 1ST FLR;  Service: Urology;  Laterality: N/A;    CYSTOSCOPY  11/16/2021    Procedure: CYSTOSCOPY;  Surgeon: Shireen Mayo MD;  Location: [facility] OR 1ST FLR;  Service: Urology;;    CYSTOSCOPY  7/19/2022    Procedure: CYSTOSCOPY;  Surgeon: Shireen Mayo MD;  Location: [facility] OR 1ST FLR;  Service: Urology;;    CYSTOSCOPY WITH INJECTION OF PERIURETHRAL BULKING AGENT  7/19/2022    Procedure: CYSTOSCOPY, WITH PERIURETHRAL BULKING AGENT INJECTION-MACROPLASTIQUE;  Surgeon: Shireen Mayo MD;  Location: [facility] OR 1ST FLR;  Service: Urology;;    CYSTOSCOPY WITH INJECTION OF PERIURETHRAL BULKING AGENT N/A 3/28/2023    Procedure: CYSTOSCOPY, WITH PERIURETHRAL BULKING AGENT INJECTION;  Surgeon: Shireen Mayo MD;  Location: [facility] OR 1ST FLR;  Service: Urology;  Laterality: N/A;  [place]    CYSTOSCOPY,WITH BOTULINUM TOXIN INJECTION N/A 12/13/2022    Procedure: CYSTOSCOPY,WITH BOTULINUM TOXIN INJECTION;  Surgeon: Shireen Mayo MD;  Location: [facility] OR 1ST FLR;  Service: Urology;  Laterality: N/A;  300 U    CYSTOSCOPY,WITH  BOTULINUM TOXIN INJECTION N/A 3/28/2023    Procedure: CYSTOSCOPY,WITH BOTULINUM TOXIN INJECTION;  Surgeon: Shireen Mayo MD;  [facility];  Service: Urology;  Laterality: N/A;  45 MIN.    300 UNITS    ESOPHAGOGASTRODUODENOSCOPY N/A 5/23/2018    Procedure: ESOPHAGOGASTRODUODENOSCOPY (EGD);  Surgeon: Prince Vance MD;  Location: [facility] (4TH FLR);  Service: Endoscopy;  Laterality: N/A;  r/s 'd per Dr. Vance due to family emergency- ER    HYSTERECTOMY  1975    endometriosis    INJECTION OF BOTULINUM TOXIN TYPE A  7/13/2021    Procedure: INJECTION, BOTULINUM TOXIN, 200units;  Surgeon: Shireen Mayo MD;  [facility];  Service: Urology;;    INJECTION OF BOTULINUM TOXIN TYPE A  11/16/2021    Procedure: INJECTION, BOTULINUM TOXIN, 200units;  Surgeon: Shireen Mayo MD;  [facility];  Service: Urology;;    INJECTION OF BOTULINUM TOXIN TYPE A  7/19/2022    Procedure: INJECTION, BOTULINUM TOXIN, 300 units ;  Surgeon: Shireen Mayo MD;  [facility];  Service: Urology;;    INSERTION, SUPRAPUBIC CATHETER N/A 12/13/2022    Procedure: INSERTION, SUPRAPUBIC CATHETER;  Surgeon: Shireen Mayo MD;  [facility];  Service: Urology;  Laterality: N/A;  exchange    pain pump placement      SQ Dilaudid Pump managed by Dr. Hillman, Pain Management    REMOVAL OF BONE SPUR OF FOOT Bilateral 9/16/2022    Procedure: EXCISION ARTHRITIC BONE, BILATERAL FOOT;  Surgeon: Adam Mcguire DPM;  Location: [facility] OR;  Service: Podiatry;  Laterality: Bilateral;    REPLACEMENT OF CATHETER N/A 10/31/2019    Procedure: REPLACEMENT, CATHETER-SUPRAPUBIC;  Surgeon: Shireen Mayo MD;  [facility];  Service: Urology;  Laterality: N/A;    SPINAL CORD STIMULATOR REMOVAL      before [facility]    SPINE SURGERY  5-13-13    CERVICAL FUSION    TONSILLECTOMY         Family History   Problem Relation Age of Onset    Cancer Mother 55        breast    Cancer  Father         esophagus,had laryngectomy    Esophageal cancer Father     Parkinsonism Maternal Grandmother     Tremor Maternal Grandmother     No Known Problems Brother     No Known Problems Brother     Heart disease Maternal Uncle     Colon cancer Maternal Uncle         Less than 60    No Known Problems Sister     No Known Problems Maternal Aunt     Cirrhosis Paternal Aunt         ETOH    Liver disease Paternal Aunt         ETOH    Liver disease Paternal Uncle         ETOH    Cirrhosis Paternal Uncle         ETOH    No Known Problems Maternal Grandfather     No Known Problems Paternal Grandmother     No Known Problems Paternal Grandfather     Melanoma Neg Hx     Amblyopia Neg Hx     Blindness Neg Hx     Cataracts Neg Hx     Diabetes Neg Hx     Glaucoma Neg Hx     Hypertension Neg Hx     Macular degeneration Neg Hx     Retinal detachment Neg Hx     Strabismus Neg Hx     Stroke Neg Hx     Thyroid disease Neg Hx     Celiac disease Neg Hx     Colon polyps Neg Hx     Cystic fibrosis Neg Hx     Crohn's disease Neg Hx     Inflammatory bowel disease Neg Hx     Liver cancer Neg Hx     Rectal cancer Neg Hx     Stomach cancer Neg Hx     Ulcerative colitis Neg Hx     Lymphoma Neg Hx          MEDICATIONS & ALLERGIES:     Current Outpatient Medications on File Prior to Visit   Medication Sig Dispense Refill    albuterol-ipratropium (DUO-NEB) 2.5 mg-0.5 mg/3 mL nebulizer solution Take 3 mLs by nebulization every 6 (six) hours as needed for Wheezing or Shortness of Breath. Rescue 90 mL 0    aspirin (ECOTRIN) 81 MG EC tablet Take 1 tablet (81 mg total) by mouth once daily. 90 tablet 3    atorvastatin (LIPITOR) 80 MG tablet TAKE ONE TABLET BY MOUTH EVERY DAY (Patient taking differently: Take 80 mg by mouth once daily.) 90 tablet 3    cyanocobalamin, vitamin B-12, 5,000 mcg Subl Place 1 tablet under the tongue once daily.      docusate sodium (COLACE) 100 MG capsule Take 200 mg by mouth every evening.      ferrous gluconate (FERGON)  324 MG tablet Take 1 tablet (324 mg total) by mouth daily with breakfast. 90 tablet 1    fludrocortisone (FLORINEF) 0.1 mg Tab Take 1 tablet (100 mcg total) by mouth once daily. 90 tablet 2    FLUoxetine 20 MG capsule Take 3 capsules (60 mg total) by mouth once daily. 270 capsule 3    fluticasone propionate (FLONASE) 50 mcg/actuation nasal spray 2 sprays (100 mcg total) by Each Nostril route once daily. 16 g 0    furosemide (LASIX) 40 MG tablet Take 1 tablet (40 mg total) by mouth 2 (two) times a day. 90 tablet 3    HYDROcodone-acetaminophen (NORCO)  mg per tablet Take 1 tablet by mouth every 6 (six) hours as needed for breakthrough pain.      hydrocortisone (CORTEF) 10 MG Tab Take 1 tab in the morning and 0.5 tab in the afternoon (Patient taking differently: Take 10 mg by mouth every morning.) 135 tablet 3    hydrocortisone (CORTEF) 10 MG Tab Take 5 mg by mouth every evening. 1/2 tablet      hydrOXYzine (ATARAX) 50 MG tablet Take 1 tablet (50 mg total) by mouth every 4 (four) hours as needed for Anxiety. 30 tablet 0    intrathecal pain pump compound Hydromorphone (7.5 mg/mL) infusion at 8.59 mg/day (0.3578 mg/hr) out of a total reservoir volume of 40 mL  Pump filled monthly      ketorolac (TORADOL) 10 mg tablet Take 10 mg by mouth daily as needed.      levothyroxine (SYNTHROID) 150 MCG tablet Take 1 tablet (150 mcg total) by mouth before breakfast. 30 tablet 11    LIDOcaine (LIDODERM) 5 % Place 1 patch onto the skin once daily. Remove & Discard patch within 12 hours or as directed by MD  0    liothyronine (CYTOMEL) 25 MCG Tab Take 1 tablet (25 mcg total) by mouth once daily. 30 tablet 11    nitroGLYCERIN (NITROSTAT) 0.4 MG SL tablet Place 0.4 mg under the tongue every 5 (five) minutes as needed for Chest pain.      ondansetron (ZOFRAN-ODT) 4 MG TbDL Take 4 mg by mouth every 4 to 6 hours as needed.      pantoprazole (PROTONIX) 40 MG tablet Take 1 tablet (40 mg total) by mouth once daily. 90 tablet 3     potassium chloride SA (K-DUR,KLOR-CON) 20 MEQ tablet Take 1 tablet (20 mEq total) by mouth once daily. 15 tablet 0    promethazine (PHENERGAN) 25 MG tablet Take 25 mg by mouth every 6 (six) hours as needed for Nausea.      QUEtiapine (SEROQUEL) 100 MG Tab TAKE 2 TABLETS (200 MG) BY MOUTH NIGHTLY (Patient taking differently: Take 200 mg by mouth nightly.) 180 tablet 3    senna (SENNA LAX) 8.6 mg tablet Take 2 tablets by mouth 2 (two) times daily.      tiotropium bromide (SPIRIVA RESPIMAT) 2.5 mcg/actuation inhaler Inhale 2 puffs into the lungs once daily. Controller 4 g 0    tiZANidine (ZANAFLEX) 4 MG tablet Take 4 mg by mouth 2 (two) times daily as needed.      traZODone (DESYREL) 300 MG tablet Take 1 tablet (300 mg total) by mouth every evening. 90 tablet 0     No current facility-administered medications on file prior to visit.        Review of patient's allergies indicates:   Allergen Reactions    (d)-limonene flavor      Other reaction(s): difficult intubation  Other reaction(s): Difficulty breathing    Bactrim [sulfamethoxazole-trimethoprim] Anaphylaxis    Benadryl [diphenhydramine hcl] Shortness Of Breath    Fentanyl Itching, Nausea And Vomiting and Swelling             Imitrex [sumatriptan succinate] Shortness Of Breath    Percocet [oxycodone-acetaminophen] Shortness Of Breath    Topamax [topiramate] Shortness Of Breath    Vancomycin Anaphylaxis     Rash    Butorphanol tartrate     Darvocet a500 [propoxyphene n-acetaminophen]      Other reaction(s): Difficulty breathing    Evening primrose (oenothera biennis)     Lyrica [pregabalin] Other (See Comments)     tremors    White petrolatum-zinc     Zinc oxide-white petrolatum      Other reaction(s): Difficulty breathing    Latex, natural rubber Itching and Rash    Phenytoin Rash and Other (See Comments)     Trouble breathing       OBJECTIVE:     Vital Signs:  BP (!) 92/59 (BP Location: Right arm, Patient Position: Sitting, BP Method: Medium (Automatic))   Pulse  61   Temp 97.4 °F (36.3 °C) (Oral)   Wt 95.6 kg (210 lb 12.2 oz)   LMP  (LMP Unknown)   SpO2 (!) 90%   BMI 35.07 kg/m²   Wt Readings from Last 3 Encounters:   06/08/23 1141 95.6 kg (210 lb 12.2 oz)   05/30/23 0600 94.3 kg (207 lb 14.3 oz)   05/29/23 2025 95.9 kg (211 lb 6.7 oz)   05/29/23 0632 95.3 kg (210 lb 1.6 oz)   05/27/23 2344 103.4 kg (227 lb 15.3 oz)   05/27/23 1455 105.3 kg (232 lb 2.3 oz)   05/27/23 0007 105.3 kg (232 lb 2.3 oz)   05/25/23 2309 106.9 kg (235 lb 10.8 oz)   05/24/23 2316 106.6 kg (235 lb 0.2 oz)   05/24/23 2212 103.7 kg (228 lb 9.9 oz)   05/24/23 1447 104.8 kg (231 lb 0.7 oz)   05/24/23 1444 104.8 kg (231 lb)   05/22/23 0940 104.7 kg (230 lb 13.2 oz)     Body mass index is 35.07 kg/m².        Physical Exam:  BP (!) 92/59 (BP Location: Right arm, Patient Position: Sitting, BP Method: Medium (Automatic))   Pulse 61   Temp 97.4 °F (36.3 °C) (Oral)   Wt 95.6 kg (210 lb 12.2 oz)   LMP  (LMP Unknown)   SpO2 (!) 90%   BMI 35.07 kg/m²   General appearance: alert, cooperative, no distress  Constitutional:Oriented to person, place, and time  + obese    HEENT: Normocephalic, atraumatic, neck symmetrical, no nasal discharge   + hearing impaired   Eyes: conjunctivae/corneas clear, PERRL, EOM's intact  Lungs: clear to auscultation bilaterally, no dullness to percussion bilaterally  + poor inspiratory effort   Heart: regular rate and rhythm without rub; no displacement of the PMI   Abdomen: soft, non-tender; bowel sounds normoactive; no organomegaly  Extremities: + kyphosis  + bilateral LE non pitting edema   Integument: Skin color, texture, turgor normal; no rashes; hair distrubution normal  Neurologic: Alert and oriented X 3, normal strength, normal coordination and gait  Psychiatric: no pressured speech; normal affect; no evidence of impaired cognition   - suprapubic catheter in place     Laboratory  Lab Results   Component Value Date    WBC 5.31 05/29/2023    HGB 10.6 (L) 05/29/2023    HCT  35.3 (L) 05/29/2023    MCV 85 05/29/2023     05/29/2023     BMP  Lab Results   Component Value Date     05/30/2023    K 3.2 (L) 05/30/2023    CL 96 05/30/2023    CO2 29 05/30/2023    BUN 14 05/30/2023    CREATININE 1.1 05/30/2023    CALCIUM 10.2 05/30/2023    ANIONGAP 13 05/30/2023    EGFRNORACEVR 55 (A) 05/30/2023     Lab Results   Component Value Date    ALT 7 (L) 05/24/2023    AST 13 05/24/2023    GGT 51 04/30/2015    ALKPHOS 121 05/24/2023    BILITOT 0.3 05/24/2023     Lab Results   Component Value Date    INR 1.0 01/14/2021    INR 1.0 01/13/2021    INR 0.9 05/27/2014     Lab Results   Component Value Date    HGBA1C 5.9 (H) 07/28/2022       Diagnostic Results:    US Lower Extremity Veins Bilateral 5/24/23-  No evidence of deep venous thrombosis in either lower extremity.    Chest X Ray 5/24/23:  Congestive changes versus nonspecific pneumonitis.    2 D echo 4/29/23:  There is no evidence of intracardiac shunting.  The left ventricle is normal in size with normal systolic function.  The estimated ejection fraction is 65%.  Normal left ventricular diastolic function.  Normal right ventricular size with normal right ventricular systolic function.  The estimated PA systolic pressure is 42 mmHg.  Normal central venous pressure (3 mmHg).    ASSESSMENT & PLAN:       Lymphedema  - recent hospitalization as above   - continue current diuretic regimen  - continue compression wrapping bilateral LE which is being managed by home health team  - may benefit from management in collaboration with wound care clinic, especially in setting of open/weeping wounds   -     Ambulatory referral/consult to Wound Clinic; Future; Expected date: 06/15/2023    Hypokalemia  - unable to tolerate current formulation of potassium due to dysphagia and not agreeable to liquid potassium  - will trial different pill formulation   -     potassium chloride SA (K-DUR,KLOR-CON M) 10 MEQ tablet; Take 2 tablets (20 mEq total) by mouth once  daily.  Dispense: 60 tablet; Refill: 3    Other orders  -     tiotropium bromide (SPIRIVA RESPIMAT) 2.5 mcg/actuation inhaler; Inhale 2 puffs into the lungs once daily. Controller  Dispense: 4 g; Refill: 1    Patient will be released from hospital follow up clinic.  She will see her PCP, Dr Hodges, 6/20/23.   I have asked the GI team to expedite scheduling of EGD as initially requested by GI physician on 5/17/23.     Instructions for the patient:      Scheduled Follow-up :  Future Appointments   Date Time Provider Department Center   6/12/2023  1:30 PM Efe Hickey MD [facility] ENDODIA [address]   6/13/2023  1:30 PM Amanda Head NP [facility] HEM ONC [facility]   6/20/2023 11:00 AM Mesfin Hodges II, MD [facility] IM [address] PCW   6/28/2023  8:00 AM Jose Ortiz MD [facility] PULMSVC [address]   6/30/2023 10:00 AM Kathryn Pittman MD [facility] ALLERG [place]   7/14/2023 11:00 AM Mesfin Hodges II, MD [facility] [address] PCW   7/24/2023  9:05 AM LAB, APPOINTMENT [facility] LAB [facility]   7/31/2023 11:30 AM Prince Vance MD [facility] GASTRO [address]       Post Visit Medication List:     Medication List            Accurate as of June 8, 2023  1:35 PM. If you have any questions, ask your nurse or doctor.                CHANGE how you take these medications      atorvastatin 80 MG tablet  Commonly known as: LIPITOR  TAKE ONE TABLET BY MOUTH EVERY DAY  What changed: when to take this     potassium chloride SA 10 MEQ tablet  Commonly known as: K-DUR,KLOR-CON M  Take 2 tablets (20 mEq total) by mouth once daily.  What changed: medication strength  Changed by: Imani Juarez MD     QUEtiapine 100 MG Tab  Commonly known as: SEROQUEL  TAKE 2 TABLETS (200 MG) BY MOUTH NIGHTLY  What changed:   how much to take  how to take this  when to take this  additional instructions            CONTINUE taking these medications      albuterol-ipratropium 2.5 mg-0.5 mg/3 mL nebulizer solution  Commonly known as: DUO-NEB  Take 3 mLs by  nebulization every 6 (six) hours as needed for Wheezing or Shortness of Breath. Rescue     aspirin 81 MG EC tablet  Commonly known as: ECOTRIN  Take 1 tablet (81 mg total) by mouth once daily.     cyanocobalamin (vitamin B-12) 5,000 mcg Subl     docusate sodium 100 MG capsule  Commonly known as: COLACE     ferrous gluconate 324 MG tablet  Commonly known as: FERGON  Take 1 tablet (324 mg total) by mouth daily with breakfast.     fludrocortisone 0.1 mg Tab  Commonly known as: FLORINEF  Take 1 tablet (100 mcg total) by mouth once daily.     FLUoxetine 20 MG capsule  Take 3 capsules (60 mg total) by mouth once daily.     fluticasone propionate 50 mcg/actuation nasal spray  Commonly known as: FLONASE  2 sprays (100 mcg total) by Each Nostril route once daily.     furosemide 40 MG tablet  Commonly known as: LASIX  Take 1 tablet (40 mg total) by mouth 2 (two) times a day.     HYDROcodone-acetaminophen  mg per tablet  Commonly known as: NORCO     hydrocortisone 10 MG Tab  Commonly known as: CORTEF     hydrOXYzine 50 MG tablet  Commonly known as: ATARAX  Take 1 tablet (50 mg total) by mouth every 4 (four) hours as needed for Anxiety.     INTRATHECAL PAIN PUMP COMPOUND     ketorolac 10 mg tablet  Commonly known as: TORADOL     levothyroxine 150 MCG tablet  Commonly known as: SYNTHROID  Take 1 tablet (150 mcg total) by mouth before breakfast.     LIDOcaine 5 %  Commonly known as: LIDODERM  Place 1 patch onto the skin once daily. Remove & Discard patch within 12 hours or as directed by MD     liothyronine 25 MCG Tab  Commonly known as: CYTOMEL  Take 1 tablet (25 mcg total) by mouth once daily.     nitroGLYCERIN 0.4 MG SL tablet  Commonly known as: NITROSTAT     ondansetron 4 MG Tbdl  Commonly known as: ZOFRAN-ODT     pantoprazole 40 MG tablet  Commonly known as: PROTONIX  Take 1 tablet (40 mg total) by mouth once daily.     promethazine 25 MG tablet  Commonly known as: PHENERGAN     senna 8.6 mg tablet  Commonly known as:  SENNA LAX  Take 2 tablets by mouth 2 (two) times daily.     tiotropium bromide 2.5 mcg/actuation inhaler  Commonly known as: SPIRIVA RESPIMAT  Inhale 2 puffs into the lungs once daily. Controller     tiZANidine 4 MG tablet  Commonly known as: ZANAFLEX     traZODone 300 MG tablet  Commonly known as: DESYREL  Take 1 tablet (300 mg total) by mouth every evening.               Where to Get Your Medications        These medications were sent to [facility]  [address], [place] [address]      Hours: Mon-Fri, 8a-5:30p Phone: [phone]   potassium chloride SA 10 MEQ tablet  tiotropium bromide 2.5 mcg/actuation inhaler         Signing Physician:  Imani Juarez MD

## 2023-06-08 NOTE — TELEPHONE ENCOUNTER
----- Message from Elida Kee sent at 6/8/2023  9:01 AM CDT -----  Regarding: returning call  Contact: pt [phone]  Who Called:aTsha    Who Left Message for Patient:Dahlia    Does the patient know what this is regarding?:scheduling an EGD    Would the patient rather a call back or a response via [facility]? Call back    Best Call Back Number:[phone]    Additional Information: pt stated she is having a hard time swallowing

## 2023-06-09 ENCOUNTER — DOCUMENT SCAN (OUTPATIENT)
Dept: HOME HEALTH SERVICES | Facility: HOSPITAL | Age: 68
End: 2023-06-09
Payer: MEDICARE

## 2023-06-09 ENCOUNTER — TELEPHONE (OUTPATIENT)
Dept: ENDOSCOPY | Facility: HOSPITAL | Age: 68
End: 2023-06-09
Payer: MEDICARE

## 2023-06-09 NOTE — TELEPHONE ENCOUNTER
Received message from Dahlia. Called and spoke to patient's daughter, Jaycee. Spoke to patient. EGD  rescheduled. Date and Time confirmed.  Instructions reviewed and verbalized. Instructions sent via portal.  Ms. Champion verbalized an understanding.

## 2023-06-12 ENCOUNTER — OFFICE VISIT (OUTPATIENT)
Dept: ENDOCRINOLOGY | Facility: CLINIC | Age: 68
End: 2023-06-12
Payer: MEDICARE

## 2023-06-12 ENCOUNTER — TELEPHONE (OUTPATIENT)
Dept: UROLOGY | Facility: CLINIC | Age: 68
End: 2023-06-12
Payer: MEDICARE

## 2023-06-12 VITALS
HEIGHT: 65 IN | SYSTOLIC BLOOD PRESSURE: 100 MMHG | WEIGHT: 211.19 LBS | BODY MASS INDEX: 35.18 KG/M2 | DIASTOLIC BLOOD PRESSURE: 60 MMHG

## 2023-06-12 DIAGNOSIS — E27.40 ADRENAL INSUFFICIENCY: Primary | ICD-10-CM

## 2023-06-12 DIAGNOSIS — E03.9 HYPOTHYROIDISM (ACQUIRED): ICD-10-CM

## 2023-06-12 PROCEDURE — 3288F PR FALLS RISK ASSESSMENT DOCUMENTED: ICD-10-PCS | Mod: CPTII,S$GLB,, | Performed by: INTERNAL MEDICINE

## 2023-06-12 PROCEDURE — 1159F PR MEDICATION LIST DOCUMENTED IN MEDICAL RECORD: ICD-10-PCS | Mod: CPTII,S$GLB,, | Performed by: INTERNAL MEDICINE

## 2023-06-12 PROCEDURE — 3078F PR MOST RECENT DIASTOLIC BLOOD PRESSURE < 80 MM HG: ICD-10-PCS | Mod: CPTII,S$GLB,, | Performed by: INTERNAL MEDICINE

## 2023-06-12 PROCEDURE — 99204 OFFICE O/P NEW MOD 45 MIN: CPT | Mod: S$GLB,,, | Performed by: INTERNAL MEDICINE

## 2023-06-12 PROCEDURE — 3008F PR BODY MASS INDEX (BMI) DOCUMENTED: ICD-10-PCS | Mod: CPTII,S$GLB,, | Performed by: INTERNAL MEDICINE

## 2023-06-12 PROCEDURE — 1159F MED LIST DOCD IN RCRD: CPT | Mod: CPTII,S$GLB,, | Performed by: INTERNAL MEDICINE

## 2023-06-12 PROCEDURE — 99204 PR OFFICE/OUTPT VISIT, NEW, LEVL IV, 45-59 MIN: ICD-10-PCS | Mod: S$GLB,,, | Performed by: INTERNAL MEDICINE

## 2023-06-12 PROCEDURE — 3288F FALL RISK ASSESSMENT DOCD: CPT | Mod: CPTII,S$GLB,, | Performed by: INTERNAL MEDICINE

## 2023-06-12 PROCEDURE — 3074F SYST BP LT 130 MM HG: CPT | Mod: CPTII,S$GLB,, | Performed by: INTERNAL MEDICINE

## 2023-06-12 PROCEDURE — 3078F DIAST BP <80 MM HG: CPT | Mod: CPTII,S$GLB,, | Performed by: INTERNAL MEDICINE

## 2023-06-12 PROCEDURE — 1126F AMNT PAIN NOTED NONE PRSNT: CPT | Mod: CPTII,S$GLB,, | Performed by: INTERNAL MEDICINE

## 2023-06-12 PROCEDURE — 99999 PR PBB SHADOW E&M-EST. PATIENT-LVL IV: CPT | Mod: PBBFAC,,, | Performed by: INTERNAL MEDICINE

## 2023-06-12 PROCEDURE — 1160F RVW MEDS BY RX/DR IN RCRD: CPT | Mod: CPTII,S$GLB,, | Performed by: INTERNAL MEDICINE

## 2023-06-12 PROCEDURE — 99999 PR PBB SHADOW E&M-EST. PATIENT-LVL IV: ICD-10-PCS | Mod: PBBFAC,,, | Performed by: INTERNAL MEDICINE

## 2023-06-12 PROCEDURE — 3074F PR MOST RECENT SYSTOLIC BLOOD PRESSURE < 130 MM HG: ICD-10-PCS | Mod: CPTII,S$GLB,, | Performed by: INTERNAL MEDICINE

## 2023-06-12 PROCEDURE — 1126F PR PAIN SEVERITY QUANTIFIED, NO PAIN PRESENT: ICD-10-PCS | Mod: CPTII,S$GLB,, | Performed by: INTERNAL MEDICINE

## 2023-06-12 PROCEDURE — 1101F PR PT FALLS ASSESS DOC 0-1 FALLS W/OUT INJ PAST YR: ICD-10-PCS | Mod: CPTII,S$GLB,, | Performed by: INTERNAL MEDICINE

## 2023-06-12 PROCEDURE — 3008F BODY MASS INDEX DOCD: CPT | Mod: CPTII,S$GLB,, | Performed by: INTERNAL MEDICINE

## 2023-06-12 PROCEDURE — 1160F PR REVIEW ALL MEDS BY PRESCRIBER/CLIN PHARMACIST DOCUMENTED: ICD-10-PCS | Mod: CPTII,S$GLB,, | Performed by: INTERNAL MEDICINE

## 2023-06-12 PROCEDURE — 1101F PT FALLS ASSESS-DOCD LE1/YR: CPT | Mod: CPTII,S$GLB,, | Performed by: INTERNAL MEDICINE

## 2023-06-12 RX ORDER — FLUDROCORTISONE ACETATE 0.1 MG/1
TABLET ORAL
Qty: 15 TABLET | Refills: 5 | Status: SHIPPED | OUTPATIENT
Start: 2023-06-12

## 2023-06-12 RX ORDER — HYDROCORTISONE 10 MG/1
10 TABLET ORAL NIGHTLY
Qty: 30 TABLET | Refills: 5 | Status: SHIPPED | OUTPATIENT
Start: 2023-06-12 | End: 2024-03-22 | Stop reason: SDUPTHER

## 2023-06-12 NOTE — TELEPHONE ENCOUNTER
----- Message from Ruth Castro sent at 6/12/2023  2:29 PM CDT -----  Regarding: Patient Advice  Contact: Nicolette [phone]  Nicolette/ [facility] is calling to state pt has a catheter and pt is complaining about burning and a foul oder to urine please call

## 2023-06-12 NOTE — TELEPHONE ENCOUNTER
Returned call to Nicolette. States pt c/o burning, bladder pain and pressure with leaking per her urethra. Advised that pt will need catheter change and urine collected for culture from the new catheter (verbal order).

## 2023-06-12 NOTE — PATIENT INSTRUCTIONS
Decrease your hydrocortisone dose to 10 mg once daily    Decrease your fludrocortisone dose to a half-tablet once daily    You will need to have labs drawn at 8 AM for ACTH and cortisol (be sure to hold your hydrocortisone and fludrocortisone dose the morning of the labs, you can take both of these after th labs are drawn)

## 2023-06-12 NOTE — PROGRESS NOTES
Subjective:      Patient ID: Tasha Hawley is a 67 y.o. female.    Chief Complaint:  Other Misc (Evaluate for adrenal insufficiency)      History of Present Illness  Ms. Hawley presents for evaluation for possible adrenal insufficiency. Has significant PMH including chronic pain with long-term opioid use, recurrent UTIs, lymphedema, debility, chronic diastolic HF, neurogenic bladder w/ suprapubic catheter, depression, and anxiety.    Her daughter was available via phone to assist with the history.     Was first suspected to have possible adrenal insufficiency during hospital stay in 1/2023. She was started on hydrocortison and fludrocortisone (see ACTH stimulation test below):    ACTH stimulation test:   Latest Reference Range & Units 01/16/23 07:57 01/16/23 08:35 01/16/23 09:10 01/17/23 07:48   ALDOSTERONE ng/dL    6.9   Cortisol ug/dL 8.60 16.40 17.20       Latest Reference Range & Units 01/17/23 05:59   Albumin 3.5 - 5.2 g/dL 2.5 (L)      Latest Reference Range & Units 01/17/23 07:48   ALDOSTERONE ng/dL 6.9       Currently taking fludrocortisone 100 mcg once daily and hydrocortisone 10/5/5mg. She was recently admitted with worsening lower extremity edema and also found to have hypokalemia during that hospital stay. Prior to the initiation of this steroid regimen she did not have a history of hyperkalemia on review of labs.     Her daughter reports that she has had hypotension for many years and that it did not appreciably improve on varying doses of glucocorticoid and mineralocorticoid replacement.     She has had no chronic exposure to IM or oral glucocorticoids. No recent IM or intrarticular steroid injections.       Also with hypothyroidism:    Currently taking levothyroxine 150 mcg once daily and liothyronine 25 mcg once daily.     Review of Systems   Constitutional:  Negative for chills and fever.   Gastrointestinal:  Negative for nausea.     Objective:   Physical Exam  Vitals and nursing note  reviewed.     BP Readings from Last 3 Encounters:   06/13/23 (!) 109/52   06/12/23 100/60   06/08/23 (!) 92/59     Wt Readings from Last 1 Encounters:   06/13/23 1339 95.7 kg (210 lb 15.7 oz)       Body mass index is 35.15 kg/m².    Lab Review:   Lab Results   Component Value Date    HGBA1C 5.9 (H) 07/28/2022     Lab Results   Component Value Date    CHOL 122 05/22/2023    HDL 47 05/22/2023    LDLCALC 60.4 (L) 05/22/2023    TRIG 73 05/22/2023    CHOLHDL 38.5 05/22/2023     Lab Results   Component Value Date     05/30/2023    K 3.2 (L) 05/30/2023    CL 96 05/30/2023    CO2 29 05/30/2023    GLU 97 05/30/2023    BUN 14 05/30/2023    CREATININE 1.1 05/30/2023    CALCIUM 10.2 05/30/2023    PROT 6.9 05/24/2023    ALBUMIN 3.0 (L) 05/24/2023    BILITOT 0.3 05/24/2023    ALKPHOS 121 05/24/2023    AST 13 05/24/2023    ALT 7 (L) 05/24/2023    ANIONGAP 13 05/30/2023    ESTGFRAFRICA >60 07/31/2022    EGFRNONAA >60 07/31/2022    TSH 3.297 04/28/2023         Assessment and Plan     Adrenal insufficiency  --Patient suspected of having adrenal insufficiency in the setting of chronic hypotension during hospital stay in 1/2023  --I reviewed her endocrine labs from that hospital stay  --She had a a baseline cortisol level that was near 8.0, after ACTH stimulation her cortisol peaked at 17.2 with a delta change of 9 (in the setting of hypoalbuminemia)  --I would consider this to be a normal and appropriate response to ACTH stimulation given low albumin state and delta change of 9 from baseline  --I do not think adrenal insufficiency is the cause of her hypotension  --She has also had worsening edema and hypokalemia recently likely due in part to the additional mineralocorticoid  --Since she has been on steroids for 6 months she could have an element of AI from chronic steroid exposure so will need to wean slowly  --Will have her decrease hydrocortisone to 10 mg daily and fludrocortisone to a half tablet or 50 mcg once  daily  --Will repeat 8AM cortisol in 3 weeks after she holds her morning doses  --It is possible she is getting some blood pressure benefit from the steroids even though she didn't have AI initially so may consider an alternate agent to elevate her bp like midodrine but will defer to primary care or cardiology    Hypothyroidism (acquired)  --Will repeat TFT's with upcoming labs  --Will likely lower T3 or stop T3 all together at that time due to cardiac comorbidities  --Goal is for a normal TSH to avoid CV and bone complications      Needs 8AM cortisol, ACTH and TSH at [facility] in 3 weeks      Efe Hickey M.D. Staff Endocrinology

## 2023-06-13 ENCOUNTER — OFFICE VISIT (OUTPATIENT)
Dept: HEMATOLOGY/ONCOLOGY | Facility: CLINIC | Age: 68
End: 2023-06-13
Payer: MEDICARE

## 2023-06-13 VITALS
DIASTOLIC BLOOD PRESSURE: 52 MMHG | SYSTOLIC BLOOD PRESSURE: 109 MMHG | BODY MASS INDEX: 35.11 KG/M2 | HEART RATE: 55 BPM | OXYGEN SATURATION: 91 % | WEIGHT: 211 LBS

## 2023-06-13 DIAGNOSIS — D50.9 IRON DEFICIENCY ANEMIA, UNSPECIFIED IRON DEFICIENCY ANEMIA TYPE: ICD-10-CM

## 2023-06-13 DIAGNOSIS — D63.8 ANEMIA OF CHRONIC DISEASE: Primary | ICD-10-CM

## 2023-06-13 DIAGNOSIS — R13.19 ESOPHAGEAL DYSPHAGIA: ICD-10-CM

## 2023-06-13 PROCEDURE — 3008F BODY MASS INDEX DOCD: CPT | Mod: CPTII,S$GLB,, | Performed by: NURSE PRACTITIONER

## 2023-06-13 PROCEDURE — 3078F PR MOST RECENT DIASTOLIC BLOOD PRESSURE < 80 MM HG: ICD-10-PCS | Mod: CPTII,S$GLB,, | Performed by: NURSE PRACTITIONER

## 2023-06-13 PROCEDURE — 3074F PR MOST RECENT SYSTOLIC BLOOD PRESSURE < 130 MM HG: ICD-10-PCS | Mod: CPTII,S$GLB,, | Performed by: NURSE PRACTITIONER

## 2023-06-13 PROCEDURE — 1159F MED LIST DOCD IN RCRD: CPT | Mod: CPTII,S$GLB,, | Performed by: NURSE PRACTITIONER

## 2023-06-13 PROCEDURE — 99999 PR PBB SHADOW E&M-EST. PATIENT-LVL III: CPT | Mod: PBBFAC,,, | Performed by: NURSE PRACTITIONER

## 2023-06-13 PROCEDURE — 99999 PR PBB SHADOW E&M-EST. PATIENT-LVL III: ICD-10-PCS | Mod: PBBFAC,,, | Performed by: NURSE PRACTITIONER

## 2023-06-13 PROCEDURE — 99215 OFFICE O/P EST HI 40 MIN: CPT | Mod: S$GLB,,, | Performed by: NURSE PRACTITIONER

## 2023-06-13 PROCEDURE — 3288F FALL RISK ASSESSMENT DOCD: CPT | Mod: CPTII,S$GLB,, | Performed by: NURSE PRACTITIONER

## 2023-06-13 PROCEDURE — 3078F DIAST BP <80 MM HG: CPT | Mod: CPTII,S$GLB,, | Performed by: NURSE PRACTITIONER

## 2023-06-13 PROCEDURE — 1160F RVW MEDS BY RX/DR IN RCRD: CPT | Mod: CPTII,S$GLB,, | Performed by: NURSE PRACTITIONER

## 2023-06-13 PROCEDURE — 1101F PR PT FALLS ASSESS DOC 0-1 FALLS W/OUT INJ PAST YR: ICD-10-PCS | Mod: CPTII,S$GLB,, | Performed by: NURSE PRACTITIONER

## 2023-06-13 PROCEDURE — 99215 PR OFFICE/OUTPT VISIT, EST, LEVL V, 40-54 MIN: ICD-10-PCS | Mod: S$GLB,,, | Performed by: NURSE PRACTITIONER

## 2023-06-13 PROCEDURE — 3074F SYST BP LT 130 MM HG: CPT | Mod: CPTII,S$GLB,, | Performed by: NURSE PRACTITIONER

## 2023-06-13 PROCEDURE — 3288F PR FALLS RISK ASSESSMENT DOCUMENTED: ICD-10-PCS | Mod: CPTII,S$GLB,, | Performed by: NURSE PRACTITIONER

## 2023-06-13 PROCEDURE — 1160F PR REVIEW ALL MEDS BY PRESCRIBER/CLIN PHARMACIST DOCUMENTED: ICD-10-PCS | Mod: CPTII,S$GLB,, | Performed by: NURSE PRACTITIONER

## 2023-06-13 PROCEDURE — 1159F PR MEDICATION LIST DOCUMENTED IN MEDICAL RECORD: ICD-10-PCS | Mod: CPTII,S$GLB,, | Performed by: NURSE PRACTITIONER

## 2023-06-13 PROCEDURE — 3008F PR BODY MASS INDEX (BMI) DOCUMENTED: ICD-10-PCS | Mod: CPTII,S$GLB,, | Performed by: NURSE PRACTITIONER

## 2023-06-13 PROCEDURE — 1125F PR PAIN SEVERITY QUANTIFIED, PAIN PRESENT: ICD-10-PCS | Mod: CPTII,S$GLB,, | Performed by: NURSE PRACTITIONER

## 2023-06-13 PROCEDURE — 1101F PT FALLS ASSESS-DOCD LE1/YR: CPT | Mod: CPTII,S$GLB,, | Performed by: NURSE PRACTITIONER

## 2023-06-13 PROCEDURE — 1125F AMNT PAIN NOTED PAIN PRSNT: CPT | Mod: CPTII,S$GLB,, | Performed by: NURSE PRACTITIONER

## 2023-06-13 NOTE — PROGRESS NOTES
"  PATIENT: Tasha Hawley  MRN: [id number]  DATE: 6/13/2023    Diagnosis:   1. Anemia of chronic disease    2. Iron deficiency anemia, unspecified iron deficiency anemia type    3. Esophageal dysphagia        Chief Complaint: Iron deficiency anemia    Oncologic History:      Oncologic History     Oncologic Treatment     Pathology       Subjective:    History of Present Illness: Ms. Hawley is a 67 y.o. female who presents for evaluation and management of normocytic anemia.    - she had previously seen Dr. Ambriz. Information from his note dated 11/18/15 as per DR HENRIQUEZ's 11/ "I saw her last in 2009, for iron deficiency anemia.  I had seen her   intermittently for this problem since 2006.  She has had gastrointestinal   bleeding.  Extensive investigations had not disclosed a definite site of   bleeding, although at times, she had had severe reflux esophagitis associated   with a large hiatal hernia.  She underwent a Nissen fundoplication in September 2011.     She has not been able to tolerate oral iron preparations.  They initially caused severe constipation and more recently she says she has taken several iron tablets, but has seen them pass in her stools.     She is not aware of any recent bleeding.  In reviewing her blood counts, it is   clear that since the fundoplication in 2011, anemia has been much less   severe or absent.  In the past, she had been given intravenous iron   infusions, but since 2011, hemoglobin values were normal until May 2013.  Since   May 2013, they have been mildly below normal, between 11.1-11.8.  The most   recent hemoglobin on 10/22/2015, was 11.6.  Iron studies on 10/01/2015, include   iron 56, total iron binding capacity 398, iron saturation 14% and ferritin 22.    B12 level was 1230.     IMPRESSION:  Iron depletion as manifested by a low normal ferritin level and   possible mild iron deficiency anemia.     RECOMMENDATIONS:  I have advised Mrs. Hawley to purchase " "NovaFerrum 125, a liquid iron preparation at a dose of one teaspoon of this every other day.  She   should take this for at least 6 months to attempt to replete her iron stores.    At that time, a blood count and ferritin level can be repeated.  I see no   indication for intravenous iron at this time or any reason to carry out further   investigation of this very mild degree of anemia."    - over the past 2 years, her hemoglobin levels have ranged between 10-12 g/dL.  - today, she endorses severe fatigue, generalized weakness. She denies bleeding. She has multiple medical conditions. She has severe chronic lower-extremity edema    INTERVAL  - pt is here for anemia follow up  - she saw Dr Augustine in this clinic 11/2020 for anemia with low-normal soluble transferrin receptor suggesting anemia of chronic disease more than iron deficiency. She did have iv iron ordered by Dr Augustine 5/28/23 after outpatient labs per GI. Has received iv iron in past prior 2011 before fundoplication surgery, improved adriana after surgery.  - 4/28/23 pneumonia admit  - Iron studies 5/17/23 with low serum iron, iron sat 7%, ferritin low normal 46 ng/mL, cbc with hgb 8.3 g/dL, microcytic anemia.   - pt endorses fatigue, redman, denies HA, dizziness, chest pain, abdominal pain, hemoptysis, hematemesis, melena, hematuria, hair loss or nail changes. Pt with dysphagia, limited diet.  - EGD 6/23/23  - Supplemental oxygen dependent at baseline with 4 L NC but not wearing portable oxygen today, states "it is in the car".    Past medical, surgical, family, and social histories have been reviewed and updated below.    Past Medical History:   Past Medical History:   Diagnosis Date    Anticoagulant long-term use     Anxiety     Arthritis     Bilateral lower extremity edema     severe chronic    Carotid artery occlusion     Cataract     CHF (congestive heart failure)     Coronary artery disease     subtotalled LAD with collateral    Depression     Fever " blister     Hard of hearing     Hypokalemia 1/9/2023    Hyponatremia 2/4/2022    Hypothyroid     Iron deficiency anemia     Lumbar radiculopathy     with chronic pain    Ocular migraine     Other emphysema 5/22/2023    Renal disorder     Sleep apnea     cpap       Past Surgical History:   Past Surgical History:   Procedure Laterality Date    ADENOIDECTOMY      BACK SURGERY      x 12    CARDIAC CATHETERIZATION  2016    subtotalled LAD with right to left collaterals    CATARACT EXTRACTION W/  INTRAOCULAR LENS IMPLANT Left     Dr Coleman     CYSTOSCOPIC LITHOLAPAXY N/A 6/27/2019    Procedure: CYSTOLITHOLAPAXY;  Surgeon: Shireen Mayo MD;  Location: [facility] OR 2ND FLR;  Service: Urology;  Laterality: N/A;    CYSTOSCOPIC LITHOLAPAXY N/A 9/3/2019    Procedure: CYSTOLITHOLAPAXY;  Surgeon: Shireen Mayo MD;  Location: [facility] OR 2ND FLR;  Service: Urology;  Laterality: N/A;    CYSTOSCOPY N/A 7/13/2021    Procedure: CYSTOSCOPY;  Surgeon: Shireen Mayo MD;  Location: [facility] OR 1ST FLR;  Service: Urology;  Laterality: N/A;    CYSTOSCOPY  11/16/2021    Procedure: CYSTOSCOPY;  Surgeon: Shireen Mayo MD;  Location: [facility] OR [facility] FLR;  Service: Urology;;    CYSTOSCOPY  7/19/2022    Procedure: CYSTOSCOPY;  Surgeon: Shireen Mayo MD;  [facility];  Service: Urology;;    CYSTOSCOPY WITH INJECTION OF PERIURETHRAL BULKING AGENT  7/19/2022    Procedure: CYSTOSCOPY, WITH PERIURETHRAL BULKING AGENT INJECTION-MACROPLASTIQUE;  Surgeon: Shireen Mayo MD;  Location: [facility] OR 1ST FLR;  Service: Urology;;    CYSTOSCOPY WITH INJECTION OF PERIURETHRAL BULKING AGENT N/A 3/28/2023    Procedure: CYSTOSCOPY, WITH PERIURETHRAL BULKING AGENT INJECTION;  Surgeon: Shireen Mayo MD;  Location: [facility] OR [facility] FLR;  Service: Urology;  Laterality: N/A;  [place]    CYSTOSCOPY,WITH BOTULINUM TOXIN INJECTION N/A 12/13/2022    Procedure: CYSTOSCOPY,WITH BOTULINUM TOXIN INJECTION;  Surgeon: Shireen Mayo MD;  Location: [facility] OR [facility]  FLR;  Service: Urology;  Laterality: N/A;  300 U    CYSTOSCOPY,WITH BOTULINUM TOXIN INJECTION N/A 3/28/2023    Procedure: CYSTOSCOPY,WITH BOTULINUM TOXIN INJECTION;  Surgeon: Shireen Mayo MD;  [facility];  Service: Urology;  Laterality: N/A;  45 MIN.    300 UNITS    ESOPHAGOGASTRODUODENOSCOPY N/A 5/23/2018    Procedure: ESOPHAGOGASTRODUODENOSCOPY (EGD);  Surgeon: Prince Vance MD;  Location: [facility] (4TH FLR);  Service: Endoscopy;  Laterality: N/A;  r/s 'd per Dr. Vance due to family emergency- ER    HYSTERECTOMY  1975    endometriosis    INJECTION OF BOTULINUM TOXIN TYPE A  7/13/2021    Procedure: INJECTION, BOTULINUM TOXIN, 200units;  Surgeon: Shireen Mayo MD;  [facility];  Service: Urology;;    INJECTION OF BOTULINUM TOXIN TYPE A  11/16/2021    Procedure: INJECTION, BOTULINUM TOXIN, 200units;  Surgeon: Shireen Mayo MD;  [facility];  Service: Urology;;    INJECTION OF BOTULINUM TOXIN TYPE A  7/19/2022    Procedure: INJECTION, BOTULINUM TOXIN, 300 units ;  Surgeon: Shireen Mayo MD;  [address]  Service: Urology;;    INSERTION, SUPRAPUBIC CATHETER N/A 12/13/2022    Procedure: INSERTION, SUPRAPUBIC CATHETER;  Surgeon: Shireen Mayo MD;  Location: [address];  Service: Urology;  Laterality: N/A;  exchange    pain pump placement      SQ Dilaudid Pump managed by Dr. Hillman, Pain Management    REMOVAL OF BONE SPUR OF FOOT Bilateral 9/16/2022    Procedure: EXCISION ARTHRITIC BONE, BILATERAL FOOT;  Surgeon: Adam Mcguire DPM;  Location: [facility];  Service: Podiatry;  Laterality: Bilateral;    REPLACEMENT OF CATHETER N/A 10/31/2019    Procedure: REPLACEMENT, CATHETER-SUPRAPUBIC;  Surgeon: Shireen Mayo MD;  Location: [facility] OR [address] [place] [address];  Service: Urology;  Laterality: N/A;    SPINAL CORD STIMULATOR REMOVAL      before [facility]    SPINE SURGERY  5-13-13    CERVICAL FUSION    TONSILLECTOMY         Family History:    Family History   Problem Relation Age of Onset    Cancer Mother 55        breast    Cancer Father         esophagus,had laryngectomy    Esophageal cancer Father     Parkinsonism Maternal Grandmother     Tremor Maternal Grandmother     No Known Problems Brother     No Known Problems Brother     Heart disease Maternal Uncle     Colon cancer Maternal Uncle         Less than 60    No Known Problems Sister     No Known Problems Maternal Aunt     Cirrhosis Paternal Aunt         ETOH    Liver disease Paternal Aunt         ETOH    Liver disease Paternal Uncle         ETOH    Cirrhosis Paternal Uncle         ETOH    No Known Problems Maternal Grandfather     No Known Problems Paternal Grandmother     No Known Problems Paternal Grandfather     Melanoma Neg Hx     Amblyopia Neg Hx     Blindness Neg Hx     Cataracts Neg Hx     Diabetes Neg Hx     Glaucoma Neg Hx     Hypertension Neg Hx     Macular degeneration Neg Hx     Retinal detachment Neg Hx     Strabismus Neg Hx     Stroke Neg Hx     Thyroid disease Neg Hx     Celiac disease Neg Hx     Colon polyps Neg Hx     Cystic fibrosis Neg Hx     Crohn's disease Neg Hx     Inflammatory bowel disease Neg Hx     Liver cancer Neg Hx     Rectal cancer Neg Hx     Stomach cancer Neg Hx     Ulcerative colitis Neg Hx     Lymphoma Neg Hx        Social History:  reports that she has never smoked. She has never used smokeless tobacco. She reports current drug use. Drug: Marijuana. She reports that she does not drink alcohol.    Allergies:  Review of patient's allergies indicates:   Allergen Reactions    (d)-limonene flavor      Other reaction(s): difficult intubation  Other reaction(s): Difficulty breathing    Bactrim [sulfamethoxazole-trimethoprim] Anaphylaxis    Benadryl [diphenhydramine hcl] Shortness Of Breath    Fentanyl Itching, Nausea And Vomiting and Swelling             Imitrex [sumatriptan succinate] Shortness Of Breath    Percocet [oxycodone-acetaminophen] Shortness Of Breath     Topamax [topiramate] Shortness Of Breath    Vancomycin Anaphylaxis     Rash    Butorphanol tartrate     Darvocet a500 [propoxyphene n-acetaminophen]      Other reaction(s): Difficulty breathing    Evening primrose (oenothera biennis)     Lyrica [pregabalin] Other (See Comments)     tremors    White petrolatum-zinc     Zinc oxide-white petrolatum      Other reaction(s): Difficulty breathing    Latex, natural rubber Itching and Rash    Phenytoin Rash and Other (See Comments)     Trouble breathing       Medications:  Current Outpatient Medications   Medication Sig Dispense Refill    albuterol-ipratropium (DUO-NEB) 2.5 mg-0.5 mg/3 mL nebulizer solution Take 3 mLs by nebulization every 6 (six) hours as needed for Wheezing or Shortness of Breath. Rescue 90 mL 0    aspirin (ECOTRIN) 81 MG EC tablet Take 1 tablet (81 mg total) by mouth once daily. 90 tablet 3    atorvastatin (LIPITOR) 80 MG tablet Take 1 tablet (80 mg total) by mouth once daily. 90 tablet 3    cyanocobalamin, vitamin B-12, 5,000 mcg Subl Place 1 tablet under the tongue once daily.      docusate sodium (COLACE) 100 MG capsule Take 200 mg by mouth every evening.      ferrous gluconate (FERGON) 324 MG tablet Take 1 tablet (324 mg total) by mouth daily with breakfast. 90 tablet 1    fludrocortisone (FLORINEF) 0.1 mg Tab Take a half-tablet (50 mcg) by mouth once daily 15 tablet 5    FLUoxetine 20 MG capsule Take 3 capsules (60 mg total) by mouth once daily. 270 capsule 3    fluticasone propionate (FLONASE) 50 mcg/actuation nasal spray 2 sprays (100 mcg total) by Each Nostril route once daily. 16 g 0    furosemide (LASIX) 40 MG tablet Take 1 tablet (40 mg total) by mouth 2 (two) times a day. 90 tablet 3    HYDROcodone-acetaminophen (NORCO)  mg per tablet Take 1 tablet by mouth every 6 (six) hours as needed for breakthrough pain.      hydrocortisone (CORTEF) 10 MG Tab Take 1 tablet (10 mg total) by mouth every evening. 30 tablet 5    hydrOXYzine (ATARAX) 50  MG tablet Take 1 tablet (50 mg total) by mouth every 4 (four) hours as needed for Anxiety. 30 tablet 0    intrathecal pain pump compound Hydromorphone (7.5 mg/mL) infusion at 8.59 mg/day (0.3578 mg/hr) out of a total reservoir volume of 40 mL  Pump filled monthly      ketorolac (TORADOL) 10 mg tablet Take 10 mg by mouth daily as needed.      levothyroxine (SYNTHROID) 150 MCG tablet Take 1 tablet (150 mcg total) by mouth before breakfast. 30 tablet 11    LIDOcaine (LIDODERM) 5 % Place 1 patch onto the skin once daily. Remove & Discard patch within 12 hours or as directed by MD  0    liothyronine (CYTOMEL) 25 MCG Tab Take 1 tablet (25 mcg total) by mouth once daily. 30 tablet 11    nitroGLYCERIN (NITROSTAT) 0.4 MG SL tablet Place 0.4 mg under the tongue every 5 (five) minutes as needed for Chest pain.      ondansetron (ZOFRAN-ODT) 4 MG TbDL Take 4 mg by mouth every 4 to 6 hours as needed.      pantoprazole (PROTONIX) 40 MG tablet Take 1 tablet (40 mg total) by mouth once daily. 90 tablet 3    potassium chloride (MICRO-K) 10 MEQ CpSR Take 2 capsules (20 mEq total) by mouth once daily. 60 capsule 11    promethazine (PHENERGAN) 25 MG tablet Take 25 mg by mouth every 6 (six) hours as needed for Nausea.      QUEtiapine (SEROQUEL) 100 MG Tab TAKE 2 TABLETS (200 MG) BY MOUTH NIGHTLY (Patient taking differently: Take 200 mg by mouth nightly.) 180 tablet 3    senna (SENNA LAX) 8.6 mg tablet Take 2 tablets by mouth 2 (two) times daily.      tiotropium bromide (SPIRIVA RESPIMAT) 2.5 mcg/actuation inhaler Inhale 2 puffs into the lungs once daily. Controller 4 g 1    tiZANidine (ZANAFLEX) 4 MG tablet Take 4 mg by mouth 2 (two) times daily as needed.      traZODone (DESYREL) 300 MG tablet Take 1 tablet (300 mg total) by mouth every evening. 90 tablet 0     No current facility-administered medications for this visit.       Review of Systems   Constitutional:  Positive for fatigue.   HENT:  Negative for sore throat.    Eyes:   Negative for visual disturbance.   Respiratory:  Positive for shortness of breath. Negative for cough.    Cardiovascular:  Positive for leg swelling. Negative for chest pain.   Gastrointestinal:  Negative for abdominal pain, constipation, diarrhea, nausea and vomiting.   Genitourinary:  Negative for dysuria.   Musculoskeletal:  Positive for back pain.   Skin:  Negative for rash.   Neurological:  Negative for headaches.   Hematological:  Negative for adenopathy.   Psychiatric/Behavioral:  The patient is not nervous/anxious.      ECOG Performance Status:   ECOG SCORE 1-2            Objective:      Vitals:   Vitals:    06/13/23 1339   BP: (!) 109/52   Pulse: (!) 55   SpO2: (!) 91%   Weight: 95.7 kg (210 lb 15.7 oz)     BMI: Body mass index is 35.11 kg/m².    Physical Exam  Vitals and nursing note reviewed.   Constitutional:       Appearance: She is well-developed.      Comments: fatigued   HENT:      Head: Normocephalic and atraumatic.   Eyes:      Pupils: Pupils are equal, round, and reactive to light.   Cardiovascular:      Rate and Rhythm: Normal rate and regular rhythm.   Pulmonary:      Effort: Pulmonary effort is normal.      Breath sounds: Normal breath sounds.   Abdominal:      General: Bowel sounds are normal.      Palpations: Abdomen is soft.   Musculoskeletal:      Cervical back: Normal range of motion and neck supple.      Right lower leg: Edema present.      Left lower leg: Edema present.      Comments: Lower legs are wrapped due to edema.   Skin:     General: Skin is warm and dry.   Neurological:      Mental Status: She is alert and oriented to person, place, and time.   Psychiatric:         Behavior: Behavior normal.         Thought Content: Thought content normal.         Judgment: Judgment normal.       Laboratory Data:  Labs have been reviewed.    Lab Results   Component Value Date    WBC 5.31 05/29/2023    HGB 10.6 (L) 05/29/2023    HCT 35.3 (L) 05/29/2023    MCV 85 05/29/2023     05/29/2023            Imaging:    Assessment:       1. Anemia of chronic disease    2. Iron deficiency anemia, unspecified iron deficiency anemia type    3. Esophageal dysphagia           Plan:     1. Anemia of chronic disease / history of iron deficiency anemia / history of B12 deficiency  - I have reviewed her chart.  - she has a history of iron deficiency anemia from gastrointestinal bleeding and has required IV iron in the past.  - over the past 2 years, her hemoglobin levels have ranged between 10-12 g/dL.  - iron studies (10/13/20) are more consistent with anemia of chronic disease than iron deficiency anemia. The decreased saturated iron in the setting of normal ferritin and total iron binding capacity suggests anemia of chronic disease.  - a component of her anemia is likely dilutional in the setting of severe volume overload.  - I have ordered a few labs for further clarification: CBC, sedimentation rate, c-reactive protein, soluble transferrin receptor, B12  -The low-normal soluble transferrin receptor suggests anemia of chronic disease more than iron deficiency.  - Iron studies 5/17/23 with low serum iron, iron sat 7%, ferritin low normal 46 ng/mL, cbc with hgb 8.3 g/dL, microcytic anemia.   - iron sucrose orders placed, pt will complete at [facility] due to availability of schedule at this time, in future if needed would prefer [facility]  - keep egd appt as scheduled 6/23/23  - repeat labs in 3 months    Esophageal dysphagia  - diet limited due to this  - EGD 6/23/23  - management deferred to GI    Daughter helps coordinate medical care per pt and pt's , daughter on speaker phone during visit as well.         Amanda Head, SHONDA-C  [facility]  Hematology/Oncology  [address] [place] [address]  BRIANA Jensen  [phone]

## 2023-06-14 ENCOUNTER — TELEPHONE (OUTPATIENT)
Dept: UROLOGY | Facility: CLINIC | Age: 68
End: 2023-06-14
Payer: MEDICARE

## 2023-06-14 ENCOUNTER — TELEPHONE (OUTPATIENT)
Dept: INTERNAL MEDICINE | Facility: CLINIC | Age: 68
End: 2023-06-14
Payer: MEDICARE

## 2023-06-14 NOTE — TELEPHONE ENCOUNTER
Left pt a voicemail instructing her to contact the office back in regards of her symptoms.    Josh ALLAN.

## 2023-06-14 NOTE — TELEPHONE ENCOUNTER
----- Message from Merced Biswas sent at 6/14/2023  1:09 PM CDT -----  Who Called: Pt    What is the request in detail: Requesting call back to discuss bladder yeast infection. Please advise    Can the clinic reply by [facility]? No    Best Call Back Number: [phone]      Additional Information:

## 2023-06-14 NOTE — ASSESSMENT & PLAN NOTE
--Patient suspected of having adrenal insufficiency in the setting of chronic hypotension during hospital stay in 1/2023  --I reviewed her endocrine labs from that hospital stay  --She had a a baseline cortisol level that was near 8.0, after ACTH stimulation her cortisol peaked at 17.2 with a delta change of 9 (in the setting of hypoalbuminemia)  --I would consider this to be a normal and appropriate response to ACTH stimulation given low albumin state and delta change of 9 from baseline  --I do not think adrenal insufficiency is the cause of her hypotension  --She has also had worsening edema and hypokalemia recently likely due in part to the additional mineralocorticoid  --Since she has been on steroids for 6 months she could have an element of AI from chronic steroid exposure so will need to wean slowly  --Will have her decrease hydrocortisone to 10 mg daily and fludrocortisone to a half tablet or 50 mcg once daily  --Will repeat 8AM cortisol in 3 weeks after she holds her morning doses  --It is possible she is getting some blood pressure benefit from the steroids even though she didn't have AI initially so may consider an alternate agent to elevate her bp like midodrine but will defer to primary care or cardiology

## 2023-06-14 NOTE — ASSESSMENT & PLAN NOTE
--Will repeat TFT's with upcoming labs  --Will likely lower T3 or stop T3 all together at that time due to cardiac comorbidities  --Goal is for a normal TSH to avoid CV and bone complications

## 2023-06-14 NOTE — ASSESSMENT & PLAN NOTE
BUN 18 (up from 7), and Creatinine 1.5 (up from 0.7).  D/T Vomiting  Strict Intake and Output, Avoid nephrotoxins, and renally dose medications.       moderate

## 2023-06-14 NOTE — TELEPHONE ENCOUNTER
----- Message from Ruth Castro sent at 6/14/2023  9:23 AM CDT -----  Regarding: Patient Advice  Contact: Pt [phone]  Pt is calling to speak to provider states she has a yeast infection and is in pain please call

## 2023-06-15 ENCOUNTER — TELEPHONE (OUTPATIENT)
Dept: PULMONOLOGY | Facility: CLINIC | Age: 68
End: 2023-06-15
Payer: MEDICARE

## 2023-06-15 ENCOUNTER — TELEPHONE (OUTPATIENT)
Dept: NEUROSURGERY | Facility: CLINIC | Age: 68
End: 2023-06-15
Payer: MEDICARE

## 2023-06-15 NOTE — TELEPHONE ENCOUNTER
----- Message from Philly Ross sent at 6/15/2023 11:02 AM CDT -----  Contact: @[phone]  Caller is calling in to see if there is a later time available for the pt appt on 6/28, please call to discuss further.

## 2023-06-19 ENCOUNTER — DOCUMENT SCAN (OUTPATIENT)
Dept: HOME HEALTH SERVICES | Facility: HOSPITAL | Age: 68
End: 2023-06-19
Payer: MEDICARE

## 2023-06-20 PROCEDURE — G0179 PR HOME HEALTH MD RECERTIFICATION: ICD-10-PCS | Mod: ,,, | Performed by: UROLOGY

## 2023-06-20 PROCEDURE — G0179 MD RECERTIFICATION HHA PT: HCPCS | Mod: ,,, | Performed by: UROLOGY

## 2023-06-21 ENCOUNTER — TELEPHONE (OUTPATIENT)
Dept: INTERNAL MEDICINE | Facility: CLINIC | Age: 68
End: 2023-06-21
Payer: MEDICARE

## 2023-06-21 ENCOUNTER — TELEPHONE (OUTPATIENT)
Dept: ENDOSCOPY | Facility: HOSPITAL | Age: 68
End: 2023-06-21
Payer: MEDICARE

## 2023-06-21 DIAGNOSIS — Z12.31 ENCOUNTER FOR SCREENING MAMMOGRAM FOR MALIGNANT NEOPLASM OF BREAST: Primary | ICD-10-CM

## 2023-06-21 NOTE — TELEPHONE ENCOUNTER
----- Message from Charlene Valiente sent at 6/21/2023  3:03 PM CDT -----  Regarding: Eating before procedure  Contact: @[phone]  Pt requesting a call back to see when does she have to stop eating as her procedure is scheduled for 06.23.23 and she is unsure if she has to stop eating one week before. Please call to discuss further.

## 2023-06-21 NOTE — TELEPHONE ENCOUNTER
Pt stated she noticed the lump on her left breast, kind of in the center at the side/top. She has a little pain & sensitivity, but its tolerable. She's also having pain in her shoulder and neck on that side. And some numbness in her left arm/hands as well. She is requesting a mammogram. Pt also requesting portable oxygen order.     Josh TREVIZO

## 2023-06-21 NOTE — TELEPHONE ENCOUNTER
----- Message from Jaycee Bryson sent at 6/21/2023 10:00 AM CDT -----  Contact: Self [phone]  Would like to receive medical advice.    Would they like a call back or a response via [facility]:  call back    Additional information: Calling to request an order for a mammogram. Pt states she have a lump in left breast. Pt is requesting a rx for a portable oxygen setup.

## 2023-06-22 ENCOUNTER — PATIENT MESSAGE (OUTPATIENT)
Dept: UROLOGY | Facility: CLINIC | Age: 68
End: 2023-06-22
Payer: MEDICARE

## 2023-06-22 ENCOUNTER — TELEPHONE (OUTPATIENT)
Dept: CARDIOLOGY | Facility: CLINIC | Age: 68
End: 2023-06-22
Payer: MEDICARE

## 2023-06-22 NOTE — TELEPHONE ENCOUNTER
Ma spoke with pt daughter Jaycee and she states her mom has been following the prep and shes not sure why shes calling Jaycee confirm procedure for 6/23

## 2023-06-22 NOTE — TELEPHONE ENCOUNTER
----- Message from Veronicalakesha Reyes sent at 6/22/2023  3:26 PM CDT -----  Type:  Sooner Apoointment Request    Caller is requesting a sooner appointment.  Caller declined first available appointment listed below.  Caller will not accept being placed on the waitlist and is requesting a message be sent to doctor.  Name of Caller: pt's daughter AMINTA  When is the first available appointment?  Symptoms:coronary heart disease  Would the patient rather a call back or a response via [facility]? call  Best Call Back Number:[phone]  Additional Information: provider is not in the drop box for dx given.

## 2023-06-22 NOTE — TELEPHONE ENCOUNTER
Please call and let her know I ordered a mammogram.  She can take tylenol or any of the pain meds she has at home for pain.  For home oxygen, she needs a visit.\\    D

## 2023-06-23 ENCOUNTER — ANESTHESIA EVENT (OUTPATIENT)
Dept: ENDOSCOPY | Facility: HOSPITAL | Age: 68
End: 2023-06-23
Payer: MEDICARE

## 2023-06-23 ENCOUNTER — HOSPITAL ENCOUNTER (OUTPATIENT)
Facility: HOSPITAL | Age: 68
Discharge: HOME | End: 2023-06-23
Attending: INTERNAL MEDICINE | Admitting: INTERNAL MEDICINE
Payer: MEDICARE

## 2023-06-23 ENCOUNTER — TELEPHONE (OUTPATIENT)
Dept: UROLOGY | Facility: CLINIC | Age: 68
End: 2023-06-23
Payer: MEDICARE

## 2023-06-23 ENCOUNTER — ANESTHESIA (OUTPATIENT)
Dept: ENDOSCOPY | Facility: HOSPITAL | Age: 68
End: 2023-06-23
Payer: MEDICARE

## 2023-06-23 VITALS
HEIGHT: 64 IN | RESPIRATION RATE: 16 BRPM | OXYGEN SATURATION: 94 % | HEART RATE: 61 BPM | DIASTOLIC BLOOD PRESSURE: 60 MMHG | WEIGHT: 204 LBS | SYSTOLIC BLOOD PRESSURE: 134 MMHG | TEMPERATURE: 98 F | BODY MASS INDEX: 34.83 KG/M2

## 2023-06-23 DIAGNOSIS — R13.10 DYSPHAGIA: ICD-10-CM

## 2023-06-23 PROCEDURE — D9220A PRA ANESTHESIA: Mod: CRNA,,, | Performed by: NURSE ANESTHETIST, CERTIFIED REGISTERED

## 2023-06-23 PROCEDURE — D9220A PRA ANESTHESIA: ICD-10-PCS | Mod: CRNA,,, | Performed by: NURSE ANESTHETIST, CERTIFIED REGISTERED

## 2023-06-23 PROCEDURE — 88305 TISSUE EXAM BY PATHOLOGIST: CPT | Performed by: PATHOLOGY

## 2023-06-23 PROCEDURE — 88305 TISSUE EXAM BY PATHOLOGIST: ICD-10-PCS | Mod: 26,,, | Performed by: PATHOLOGY

## 2023-06-23 PROCEDURE — D9220A PRA ANESTHESIA: Mod: ANES,,, | Performed by: ANESTHESIOLOGY

## 2023-06-23 PROCEDURE — 43239 EGD BIOPSY SINGLE/MULTIPLE: CPT | Mod: ,,, | Performed by: STUDENT IN AN ORGANIZED HEALTH CARE EDUCATION/TRAINING PROGRAM

## 2023-06-23 PROCEDURE — 43239 PR EGD, FLEX, W/BIOPSY, SGL/MULTI: ICD-10-PCS | Mod: ,,, | Performed by: STUDENT IN AN ORGANIZED HEALTH CARE EDUCATION/TRAINING PROGRAM

## 2023-06-23 PROCEDURE — 25000003 PHARM REV CODE 250: Performed by: NURSE ANESTHETIST, CERTIFIED REGISTERED

## 2023-06-23 PROCEDURE — 43239 EGD BIOPSY SINGLE/MULTIPLE: CPT | Performed by: STUDENT IN AN ORGANIZED HEALTH CARE EDUCATION/TRAINING PROGRAM

## 2023-06-23 PROCEDURE — 63600175 PHARM REV CODE 636 W HCPCS: Performed by: NURSE ANESTHETIST, CERTIFIED REGISTERED

## 2023-06-23 PROCEDURE — 88305 TISSUE EXAM BY PATHOLOGIST: CPT | Mod: 26,,, | Performed by: PATHOLOGY

## 2023-06-23 PROCEDURE — 27201012 HC FORCEPS, HOT/COLD, DISP: Performed by: STUDENT IN AN ORGANIZED HEALTH CARE EDUCATION/TRAINING PROGRAM

## 2023-06-23 PROCEDURE — 37000008 HC ANESTHESIA 1ST 15 MINUTES: Performed by: STUDENT IN AN ORGANIZED HEALTH CARE EDUCATION/TRAINING PROGRAM

## 2023-06-23 PROCEDURE — D9220A PRA ANESTHESIA: ICD-10-PCS | Mod: ANES,,, | Performed by: ANESTHESIOLOGY

## 2023-06-23 PROCEDURE — 37000009 HC ANESTHESIA EA ADD 15 MINS: Performed by: STUDENT IN AN ORGANIZED HEALTH CARE EDUCATION/TRAINING PROGRAM

## 2023-06-23 RX ORDER — SODIUM CHLORIDE 0.9 % (FLUSH) 0.9 %
3 SYRINGE (ML) INJECTION
Status: DISCONTINUED | OUTPATIENT
Start: 2023-06-23 | End: 2023-06-23 | Stop reason: HOSPADM

## 2023-06-23 RX ORDER — PROPOFOL 10 MG/ML
VIAL (ML) INTRAVENOUS
Status: DISCONTINUED | OUTPATIENT
Start: 2023-06-23 | End: 2023-06-23

## 2023-06-23 RX ORDER — LIDOCAINE HYDROCHLORIDE 20 MG/ML
INJECTION INTRAVENOUS
Status: DISCONTINUED | OUTPATIENT
Start: 2023-06-23 | End: 2023-06-23

## 2023-06-23 RX ORDER — SODIUM CHLORIDE 9 MG/ML
INJECTION, SOLUTION INTRAVENOUS CONTINUOUS
Status: DISCONTINUED | OUTPATIENT
Start: 2023-06-23 | End: 2023-06-23 | Stop reason: HOSPADM

## 2023-06-23 RX ADMIN — PROPOFOL 50 MG: 10 INJECTION, EMULSION INTRAVENOUS at 09:06

## 2023-06-23 RX ADMIN — LIDOCAINE HYDROCHLORIDE 100 MG: 20 INJECTION INTRAVENOUS at 09:06

## 2023-06-23 RX ADMIN — PROPOFOL 100 MCG/KG/MIN: 10 INJECTION, EMULSION INTRAVENOUS at 09:06

## 2023-06-23 RX ADMIN — GLYCOPYRROLATE 0.2 MG: 0.2 INJECTION, SOLUTION INTRAMUSCULAR; INTRAVENOUS at 09:06

## 2023-06-23 RX ADMIN — SODIUM CHLORIDE: 9 INJECTION, SOLUTION INTRAVENOUS at 09:06

## 2023-06-23 NOTE — ANESTHESIA PREPROCEDURE EVALUATION
06/23/2023  Tasha Hawley is a 67 y.o., female with nausea and vomiting here for EGD.  Has hypotension - was being worked up for possible adrenal insufficiency.  Per endocrine, seems unlikely this is the cause - pt is currently on a steroid taper.      Results for orders placed during the hospital encounter of 04/28/23    Echo Saline Bubble? Yes    Interpretation Summary  · There is no evidence of intracardiac shunting.  · The left ventricle is normal in size with normal systolic function.  · The estimated ejection fraction is 65%.  · Normal left ventricular diastolic function.  · Normal right ventricular size with normal right ventricular systolic function.  · The estimated PA systolic pressure is 42 mmHg.  · Normal central venous pressure (3 mmHg).  Lab Results   Component Value Date    WBC 5.31 05/29/2023    HGB 10.6 (L) 05/29/2023    HCT 35.3 (L) 05/29/2023    MCV 85 05/29/2023     05/29/2023         Chemistry        Component Value Date/Time     05/30/2023 0604    K 3.2 (L) 05/30/2023 0604    CL 96 05/30/2023 0604    CO2 29 05/30/2023 0604    BUN 14 05/30/2023 0604    CREATININE 1.1 05/30/2023 0604    GLU 97 05/30/2023 0604        Component Value Date/Time    CALCIUM 10.2 05/30/2023 0604    ALKPHOS 121 05/24/2023 1636    AST 13 05/24/2023 1636    ALT 7 (L) 05/24/2023 1636    BILITOT 0.3 05/24/2023 1636    ESTGFRAFRICA >60 07/31/2022 0530    EGFRNONAA >60 07/31/2022 0530                 Patient Active Problem List   Diagnosis    Generalized anxiety disorder    Sleep apnea    Iron deficiency anemia    Major depressive disorder, recurrent, mild    Chronic pain syndrome    Cervical myelopathy    SOB (shortness of breath)    Narcotic dependency, continuous    Thoracic aorta atherosclerosis    Drug-induced constipation    GERD (gastroesophageal reflux disease)    Coronary artery  disease of native artery with stable angina pectoris    Neurogenic bladder    Frequent falls    Hypothyroidism (acquired)    Lymphedema    Scoliosis deformity of spine    S/P insertion of spinal cord stimulator    S/P insertion of intrathecal pump    Paresthesia of both lower extremities    Degenerative disc disease, lumbar    Osteoarthritis of spine with radiculopathy, lumbar region    Bradycardia    Bilateral carotid artery disease    Insomnia due to medical condition    Suprapubic catheter    Esophageal dysphagia    Recurrent UTI    Mixed stress and urge urinary incontinence    Inflammatory spondylopathy of lumbar region    Pure hypercholesterolemia    Chronic diastolic heart failure    Constipation due to opioid therapy    Leg pain, bilateral    Debility    History of stroke with residual deficit    Tremor    UTI (urinary tract infection)    Anxiety    Calcaneal spur of left foot    Charcot ankle, unspecified laterality    Adrenal insufficiency    Acute respiratory failure with hypoxia and hypercarbia    Palliative care encounter    Severe obesity (BMI 35.0-39.9) with comorbidity    Other emphysema    Stage 3a chronic kidney disease    Intractable pain       Review of patient's allergies indicates:   Allergen Reactions    (d)-limonene flavor      Other reaction(s): difficult intubation  Other reaction(s): Difficulty breathing    Bactrim [sulfamethoxazole-trimethoprim] Anaphylaxis    Benadryl [diphenhydramine hcl] Shortness Of Breath    Fentanyl Itching, Nausea And Vomiting and Swelling             Imitrex [sumatriptan succinate] Shortness Of Breath    Percocet [oxycodone-acetaminophen] Shortness Of Breath    Topamax [topiramate] Shortness Of Breath    Vancomycin Anaphylaxis     Rash    Butorphanol tartrate     Darvocet a500 [propoxyphene n-acetaminophen]      Other reaction(s): Difficulty breathing    Evening primrose (oenothera biennis)     Lyrica [pregabalin]  Other (See Comments)     tremors    White petrolatum-zinc     Zinc oxide-white petrolatum      Other reaction(s): Difficulty breathing    Latex, natural rubber Itching and Rash    Phenytoin Rash and Other (See Comments)     Trouble breathing       Current Outpatient Medications on File Prior to Visit   Medication Sig Dispense Refill    albuterol-ipratropium (DUO-NEB) 2.5 mg-0.5 mg/3 mL nebulizer solution Take 3 mLs by nebulization every 6 (six) hours as needed for Wheezing or Shortness of Breath. Rescue 90 mL 0    aspirin (ECOTRIN) 81 MG EC tablet Take 1 tablet (81 mg total) by mouth once daily. 90 tablet 3    atorvastatin (LIPITOR) 80 MG tablet Take 1 tablet (80 mg total) by mouth once daily. 90 tablet 3    cyanocobalamin, vitamin B-12, 5,000 mcg Subl Place 1 tablet under the tongue once daily.      docusate sodium (COLACE) 100 MG capsule Take 200 mg by mouth every evening.      ferrous gluconate (FERGON) 324 MG tablet Take 1 tablet (324 mg total) by mouth daily with breakfast. 90 tablet 1    fludrocortisone (FLORINEF) 0.1 mg Tab Take a half-tablet (50 mcg) by mouth once daily 15 tablet 5    FLUoxetine 20 MG capsule Take 3 capsules (60 mg total) by mouth once daily. 270 capsule 3    fluticasone propionate (FLONASE) 50 mcg/actuation nasal spray 2 sprays (100 mcg total) by Each Nostril route once daily. 16 g 0    furosemide (LASIX) 40 MG tablet Take 1 tablet (40 mg total) by mouth 2 (two) times a day. 90 tablet 3    HYDROcodone-acetaminophen (NORCO)  mg per tablet Take 1 tablet by mouth every 6 (six) hours as needed for breakthrough pain.      hydrocortisone (CORTEF) 10 MG Tab Take 1 tablet (10 mg total) by mouth every evening. 30 tablet 5    hydrOXYzine (ATARAX) 50 MG tablet Take 1 tablet (50 mg total) by mouth every 4 (four) hours as needed for Anxiety. 30 tablet 0    intrathecal pain pump compound Hydromorphone (7.5 mg/mL) infusion at 8.59 mg/day (0.3578 mg/hr) out of a total reservoir  volume of 40 mL  Pump filled monthly      ketorolac (TORADOL) 10 mg tablet Take 10 mg by mouth daily as needed.      levothyroxine (SYNTHROID) 150 MCG tablet Take 1 tablet (150 mcg total) by mouth before breakfast. 30 tablet 11    LIDOcaine (LIDODERM) 5 % Place 1 patch onto the skin once daily. Remove & Discard patch within 12 hours or as directed by MD  0    liothyronine (CYTOMEL) 25 MCG Tab Take 1 tablet (25 mcg total) by mouth once daily. 30 tablet 11    nitroGLYCERIN (NITROSTAT) 0.4 MG SL tablet Place 0.4 mg under the tongue every 5 (five) minutes as needed for Chest pain.      ondansetron (ZOFRAN-ODT) 4 MG TbDL Take 4 mg by mouth every 4 to 6 hours as needed.      pantoprazole (PROTONIX) 40 MG tablet Take 1 tablet (40 mg total) by mouth once daily. 90 tablet 3    potassium chloride (MICRO-K) 10 MEQ CpSR Take 2 capsules (20 mEq total) by mouth once daily. 60 capsule 11    promethazine (PHENERGAN) 25 MG tablet Take 25 mg by mouth every 6 (six) hours as needed for Nausea.      QUEtiapine (SEROQUEL) 100 MG Tab TAKE 2 TABLETS (200 MG) BY MOUTH NIGHTLY (Patient taking differently: Take 200 mg by mouth nightly.) 180 tablet 3    senna (SENNA LAX) 8.6 mg tablet Take 2 tablets by mouth 2 (two) times daily.      tiotropium bromide (SPIRIVA RESPIMAT) 2.5 mcg/actuation inhaler Inhale 2 puffs into the lungs once daily. Controller 4 g 1    tiZANidine (ZANAFLEX) 4 MG tablet Take 4 mg by mouth 2 (two) times daily as needed.      traZODone (DESYREL) 300 MG tablet Take 1 tablet (300 mg total) by mouth every evening. 90 tablet 0     No current facility-administered medications on file prior to visit.       Past Surgical History:   Procedure Laterality Date    ADENOIDECTOMY      BACK SURGERY      x 12    CARDIAC CATHETERIZATION  2016    subtotalled LAD with right to left collaterals    CATARACT EXTRACTION W/  INTRAOCULAR LENS IMPLANT Left     Dr Coleman     CYSTOSCOPIC LITHOLAPAXY N/A 6/27/2019    Procedure:  CYSTOLITHOLAPAXY;  Surgeon: Shireen Mayo MD;  Location: [facility] OR 2ND FLR;  Service: Urology;  Laterality: N/A;    CYSTOSCOPIC LITHOLAPAXY N/A 9/3/2019    Procedure: CYSTOLITHOLAPAXY;  Surgeon: Shireen Mayo MD;  Location: [facility] OR 2ND FLR;  Service: Urology;  Laterality: N/A;    CYSTOSCOPY N/A 7/13/2021    Procedure: CYSTOSCOPY;  Surgeon: Shireen Mayo MD;  Location: [facility] OR 1ST FLR;  Service: Urology;  Laterality: N/A;    CYSTOSCOPY  11/16/2021    Procedure: CYSTOSCOPY;  Surgeon: Shireen Mayo MD;  Location: [facility] OR 1ST FLR;  Service: Urology;;    CYSTOSCOPY  7/19/2022    Procedure: CYSTOSCOPY;  Surgeon: Shireen Mayo MD;  [facility];  Service: Urology;;    CYSTOSCOPY WITH INJECTION OF PERIURETHRAL BULKING AGENT  7/19/2022    Procedure: CYSTOSCOPY, WITH PERIURETHRAL BULKING AGENT INJECTION-MACROPLASTIQUE;  Surgeon: Shireen Mayo MD;  [facility];  Service: Urology;;    CYSTOSCOPY WITH INJECTION OF PERIURETHRAL BULKING AGENT N/A 3/28/2023    Procedure: CYSTOSCOPY, WITH PERIURETHRAL BULKING AGENT INJECTION;  Surgeon: Shireen Mayo MD;  [facility];  Service: Urology;  Laterality: N/A;  [place]    CYSTOSCOPY,WITH BOTULINUM TOXIN INJECTION N/A 12/13/2022    Procedure: CYSTOSCOPY,WITH BOTULINUM TOXIN INJECTION;  Surgeon: Shireen Mayo MD;  [facility];  Service: Urology;  Laterality: N/A;  300 U    CYSTOSCOPY,WITH BOTULINUM TOXIN INJECTION N/A 3/28/2023    Procedure: CYSTOSCOPY,WITH BOTULINUM TOXIN INJECTION;  Surgeon: Shireen Mayo MD;  [facility];  Service: Urology;  Laterality: N/A;  45 MIN.    300 UNITS    ESOPHAGOGASTRODUODENOSCOPY N/A 5/23/2018    Procedure: ESOPHAGOGASTRODUODENOSCOPY (EGD);  Surgeon: Prince Vance MD;  [facility] ([facility]);  Service: Endoscopy;  Laterality: N/A;  r/s 'd per Dr. Vance due to family emergency- ER    HYSTERECTOMY  1975    endometriosis    INJECTION  "OF BOTULINUM TOXIN TYPE A  7/13/2021    Procedure: INJECTION, BOTULINUM TOXIN, 200units;  Surgeon: Shireen Mayo MD;  Location: [facility] OR [address] [place] [address];  Service: Urology;;    INJECTION OF BOTULINUM TOXIN TYPE A  11/16/2021    Procedure: INJECTION, BOTULINUM TOXIN, 200units;  Surgeon: Shireen Mayo MD;  Location: [facility] OR [address] [place] [address];  Service: Urology;;    INJECTION OF BOTULINUM TOXIN TYPE A  7/19/2022    Procedure: INJECTION, BOTULINUM TOXIN, 300 units ;  Surgeon: Shireen Mayo MD;  Location: [facility] OR [address] [place] [address];  Service: Urology;;    INSERTION, SUPRAPUBIC CATHETER N/A 12/13/2022    Procedure: INSERTION, SUPRAPUBIC CATHETER;  Surgeon: Shireen Mayo MD;  Location: [facility] OR [address] [place] [address];  Service: Urology;  Laterality: N/A;  exchange    pain pump placement      SQ Dilaudid Pump managed by Dr. Hillman, Pain Management    REMOVAL OF BONE SPUR OF FOOT Bilateral 9/16/2022    Procedure: EXCISION ARTHRITIC BONE, BILATERAL FOOT;  Surgeon: Adam Mcguire DPM;  Location: [facility];  Service: Podiatry;  Laterality: Bilateral;    REPLACEMENT OF CATHETER N/A 10/31/2019    Procedure: REPLACEMENT, CATHETER-SUPRAPUBIC;  Surgeon: Shireen Mayo MD;  Location: [facility] OR [address] [place] [address];  Service: Urology;  Laterality: N/A;    SPINAL CORD STIMULATOR REMOVAL      before [place]    SPINE SURGERY  5-13-13    CERVICAL FUSION    TONSILLECTOMY         Social History     Socioeconomic History    Marital status:    Tobacco Use    Smoking status: Never    Smokeless tobacco: Never   Substance and Sexual Activity    Alcohol use: Never    Drug use: Yes     Types: Marijuana     Comment: "medical marijuana gummies"    Sexual activity: Never     Partners: Male     Social Determinants of Health     Financial Resource Strain: Low Risk     Difficulty of Paying Living Expenses: Not hard at all   Food Insecurity: No Food Insecurity    Worried About Running Out of Food in the Last Year: Never true    Ran Out of Food in the Last Year: " Never true   Transportation Needs: No Transportation Needs    Lack of Transportation (Medical): No    Lack of Transportation (Non-Medical): No   Physical Activity: Insufficiently Active    Days of Exercise per Week: 4 days    Minutes of Exercise per Session: 10 min   Stress: No Stress Concern Present    Feeling of Stress : Only a little   Social Connections: Moderately Isolated    Frequency of Communication with Friends and Family: More than three times a week    Frequency of Social Gatherings with Friends and Family: Three times a week    Attends Mu-ism Services: Never    Active Member of Clubs or Organizations: No    Attends Club or Organization Meetings: Never    Marital Status:    Housing Stability: Low Risk     Unable to Pay for Housing in the Last Year: No    Number of Places Lived in the Last Year: 1    Unstable Housing in the Last Year: No         CBC: No results for input(s): WBC, RBC, HGB, HCT, PLT, MCV, MCH, MCHC in the last 72 hours.    CMP: No results for input(s): NA, K, CL, CO2, BUN, CREATININE, GLU, MG, PHOS, CALCIUM, ALBUMIN, PROT, ALKPHOS, ALT, AST, BILITOT in the last 72 hours.    INR  No results for input(s): PT, INR, PROTIME, APTT in the last 72 hours.            2D Echo:  No results found. However, due to the size of the patient record, not all encounters were searched. Please check Results Review for a complete set of results.        Pre-op Assessment    I have reviewed the Patient Summary Reports.     I have reviewed the Nursing Notes.    I have reviewed the Medications.     Review of Systems  Anesthesia Hx:  No problems with previous Anesthesia    Hematology/Oncology:  Hematology Normal   Oncology Normal     EENT/Dental:EENT/Dental Normal   Cardiovascular:  Cardiovascular Normal CAD       Pulmonary:  Pulmonary Normal    Renal/:  Renal/ Normal     Hepatic/GI:  Hepatic/GI Normal    Musculoskeletal:  Musculoskeletal Normal    Neurological:   Neuromuscular Disease,  Headaches    Endocrine:  Endocrine Normal    Dermatological:  Skin Normal    Psych:  Psychiatric Normal           Physical Exam  General: Well nourished    Airway:  Mallampati: II   Mouth Opening: Normal  TM Distance: Normal  Tongue: Normal  Neck ROM: Normal ROM    Dental:  Intact    Chest/Lungs:  Clear to auscultation, Normal Respiratory Rate    Heart:  Rate: Normal  Rhythm: Regular Rhythm  Sounds: Normal        Anesthesia Plan  Type of Anesthesia, risks & benefits discussed:    Anesthesia Type: Gen Natural Airway  Intra-op Monitoring Plan: Standard ASA Monitors  Post Op Pain Control Plan: multimodal analgesia  Induction:  IV  Informed Consent: Informed consent signed with the Patient and all parties understand the risks and agree with anesthesia plan.  All questions answered.   ASA Score: 3    Ready For Surgery From Anesthesia Perspective.     .

## 2023-06-23 NOTE — TRANSFER OF CARE
"Anesthesia Transfer of Care Note    Patient: Tasha Hawley    Procedure(s) Performed: Procedure(s) (LRB):  EGD (ESOPHAGOGASTRODUODENOSCOPY) (N/A)    Patient location: [facility]    Anesthesia Type: MAC    Transport from OR: Transported from OR on 2-3 L/min O2 by NC with adequate spontaneous ventilation    Post pain: adequate analgesia    Post assessment: no apparent anesthetic complications    Post vital signs: stable    Level of consciousness: awake, alert and oriented    Nausea/Vomiting: no nausea/vomiting    Complications: none    Transfer of care protocol was followed      Last vitals:   Visit Vitals  BP (!) 182/63   Pulse 84   Temp 36.7 °C (98.1 °F) (Tympanic)   Resp 17   Ht 5' 4" (1.626 m)   Wt 92.5 kg (204 lb)   LMP  (LMP Unknown)   SpO2 (!) 93%   Breastfeeding No   BMI 35.02 kg/m²     "

## 2023-06-23 NOTE — TELEPHONE ENCOUNTER
Message forwarded to Dr. Mayo.    Carlos    ----- Message from Catalina Magdaleno sent at 6/23/2023  2:03 PM CDT -----  Regarding: pt advice  Contact: pt [phone]  Pt calling to advise she has a severe bladder infection and is bleeding. Pls call. Pt needs medication called in to treat condition.

## 2023-06-23 NOTE — PROVATION PATIENT INSTRUCTIONS
Discharge Summary/Instructions after an Endoscopic Procedure  Patient Name: Tasha Hawley  Patient MRN: [id number]  Patient YOB: 1955 Friday, June 23, 2023  Juaquin Barry MD  Dear patient,  As a result of recent federal legislation (The Federal Cures Act), you may   receive lab or pathology results from your procedure in your [facility]   account before your physician is able to contact you. Your physician or   their representative will relay the results to you with their   recommendations at their soonest availability.  Thank you,  RESTRICTIONS:  During your procedure today, you received medications for sedation.  These   medications may affect your judgment, balance and coordination.  Therefore,   for 24 hours, you have the following restrictions:   - DO NOT drive a car, operate machinery, make legal/financial decisions,   sign important papers or drink alcohol.    ACTIVITY:  Today: no heavy lifting, straining or running due to procedural   sedation/anesthesia.  The following day: return to full activity including work.  DIET:  Eat and drink normally unless instructed otherwise.     TREATMENT FOR COMMON SIDE EFFECTS:  - Mild abdominal pain, nausea, belching, bloating or excessive gas:  rest,   eat lightly and use a heating pad.  - Sore Throat: treat with throat lozenges and/or gargle with warm salt   water.  - Because air was used during the procedure, expelling large amounts of air   from your rectum or belching is normal.  - If a bowel prep was taken, you may not have a bowel movement for 1-3 days.    This is normal.  SYMPTOMS TO WATCH FOR AND REPORT TO YOUR PHYSICIAN:  1. Abdominal pain or bloating, other than gas cramps.  2. Chest pain.  3. Back pain.  4. Signs of infection such as: chills or fever occurring within 24 hours   after the procedure.  5. Rectal bleeding, which would show as bright red, maroon, or black stools.   (A tablespoon of blood from the rectum is not serious, especially  if   hemorrhoids are present.)  6. Vomiting.  7. Weakness or dizziness.  GO DIRECTLY TO THE NEAREST EMERGENCY ROOM IF YOU HAVE ANY OF THE FOLLOWING:      Difficulty breathing              Chills and/or fever over 101 F   Persistent vomiting and/or vomiting blood   Severe abdominal pain   Severe chest pain   Black, tarry stools   Bleeding- more than one tablespoon   Any other symptom or condition that you feel may need urgent attention  Your doctor recommends these additional instructions:  If any biopsies were taken, your doctors clinic will contact you in 1 to 2   weeks with any results.  - Discharge patient to home.   - The findings and recommendations were discussed with the patient.   - Await pathology results.   - Patient has a contact number available for emergencies.  The signs and   symptoms of potential delayed complications were discussed with the   patient.  Return to normal activities tomorrow.  Written discharge   instructions were provided to the patient.   - Consider barium swallow to assess if fundoplication is intact  For questions, problems or results please call your physician - Juaquin Barry MD at Work:  [phone].  [facility], EMERGENCY ROOM PHONE NUMBER: [phone]  IF A COMPLICATION OR EMERGENCY SITUATION ARISES AND YOU ARE UNABLE TO REACH   YOUR PHYSICIAN - GO DIRECTLY TO THE EMERGENCY ROOM.  Juaquin Barry MD  6/23/2023 9:57:52 AM  This report has been verified and signed electronically.  Dear patient,  As a result of recent federal legislation (The Federal Cures Act), you may   receive lab or pathology results from your procedure in your [facility]   account before your physician is able to contact you. Your physician or   their representative will relay the results to you with their   recommendations at their soonest availability.  Thank you,  PROVATION

## 2023-06-23 NOTE — ANESTHESIA POSTPROCEDURE EVALUATION
Anesthesia Post Evaluation    Patient: Tasha Hawley    Procedure(s) Performed: Procedure(s) (LRB):  EGD (ESOPHAGOGASTRODUODENOSCOPY) (N/A)    Final Anesthesia Type: general      Patient location during evaluation: PACU  Patient participation: Yes- Able to Participate  Level of consciousness: awake and alert and oriented  Post-procedure vital signs: reviewed and stable  Pain management: adequate  Airway patency: patent    PONV status at discharge: No PONV  Anesthetic complications: no      Cardiovascular status: blood pressure returned to baseline and hemodynamically stable  Respiratory status: unassisted and spontaneous ventilation  Hydration status: euvolemic  Follow-up not needed.          Vitals Value Taken Time   /61 06/23/23 1031   Temp 36.6 °C (97.9 °F) 06/23/23 1015   Pulse 58 06/23/23 1032   Resp 51 06/23/23 1031   SpO2 96 % 06/23/23 1032   Vitals shown include unvalidated device data.      No case tracking events are documented in the log.      Pain/Low Score: Low Score: 9 (6/23/2023 10:13 AM)

## 2023-06-23 NOTE — H&P
Short Stay Endoscopy History and Physical    PCP - Mesfin Hodges Ii, MD  Referring Physician - Prince Vance MD  [address] [place] [address]    Procedure - EGD  ASA - per anesthesia  Mallampati - per anesthesia  History of Anesthesia problems - no  Family history Anesthesia problems -  no   Plan of anesthesia - General    HPI  67 y.o. female  Reason for procedure:  Nausea and vomiting, unspecified vomiting type [R11.2]  Dysphagia, unspecified type [R13.10]      ROS:  Constitutional: No fevers, chills, No weight loss  CV: No chest pain  Pulm: No cough, No shortness of breath  GI: see HPI    Medical History:  has a past medical history of Anticoagulant long-term use, Anxiety, Arthritis, Bilateral lower extremity edema, Carotid artery occlusion, Cataract, CHF (congestive heart failure), Coronary artery disease, Depression, Fever blister, Hard of hearing, Hypokalemia (1/9/2023), Hyponatremia (2/4/2022), Hypothyroid, Iron deficiency anemia, Lumbar radiculopathy, Ocular migraine, Other emphysema (5/22/2023), Renal disorder, and Sleep apnea.    Surgical History:  has a past surgical history that includes Hysterectomy (1975); Back surgery; pain pump placement; Spine surgery (5-13-13); Cataract extraction w/  intraocular lens implant (Left); Spinal cord stimulator removal; Esophagogastroduodenoscopy (N/A, 5/23/2018); Tonsillectomy; Adenoidectomy; Cardiac catheterization (2016); Cystoscopic litholapaxy (N/A, 6/27/2019); Cystoscopic litholapaxy (N/A, 9/3/2019); Replacement of catheter (N/A, 10/31/2019); Cystoscopy (N/A, 7/13/2021); Injection of botulinum toxin type A (7/13/2021); Cystoscopy (11/16/2021); Injection of botulinum toxin type A (11/16/2021); Cystoscopy (7/19/2022); Cystoscopy with injection of periurethral bulking agent (7/19/2022); Injection of botulinum toxin type A (7/19/2022); Removal of bone spur of foot (Bilateral, 9/16/2022); cystoscopy,with botulinum toxin injection (N/A, 12/13/2022);  insertion, suprapubic catheter (N/A, 12/13/2022); cystoscopy,with botulinum toxin injection (N/A, 3/28/2023); and Cystoscopy with injection of periurethral bulking agent (N/A, 3/28/2023).    Family History: family history includes Cancer in her father; Cancer (age of onset: 55) in her mother; Cirrhosis in her paternal aunt and paternal uncle; Colon cancer in her maternal uncle; Esophageal cancer in her father; Heart disease in her maternal uncle; Liver disease in her paternal aunt and paternal uncle; No Known Problems in her brother, brother, maternal aunt, maternal grandfather, paternal grandfather, paternal grandmother, and sister; Parkinsonism in her maternal grandmother; Tremor in her maternal grandmother..    Social History:  reports that she has never smoked. She has never used smokeless tobacco. She reports current drug use. Drug: Marijuana. She reports that she does not drink alcohol.    Review of patient's allergies indicates:   Allergen Reactions    (d)-limonene flavor      Other reaction(s): difficult intubation  Other reaction(s): Difficulty breathing    Bactrim [sulfamethoxazole-trimethoprim] Anaphylaxis    Benadryl [diphenhydramine hcl] Shortness Of Breath    Fentanyl Itching, Nausea And Vomiting and Swelling             Imitrex [sumatriptan succinate] Shortness Of Breath    Percocet [oxycodone-acetaminophen] Shortness Of Breath    Topamax [topiramate] Shortness Of Breath    Vancomycin Anaphylaxis     Rash    Butorphanol tartrate     Darvocet a500 [propoxyphene n-acetaminophen]      Other reaction(s): Difficulty breathing    Evening primrose (oenothera biennis)     Lyrica [pregabalin] Other (See Comments)     tremors    White petrolatum-zinc     Zinc oxide-white petrolatum      Other reaction(s): Difficulty breathing    Latex, natural rubber Itching and Rash    Phenytoin Rash and Other (See Comments)     Trouble breathing       Medications:   Medications Prior to Admission   Medication Sig Dispense  Refill Last Dose    albuterol-ipratropium (DUO-NEB) 2.5 mg-0.5 mg/3 mL nebulizer solution Take 3 mLs by nebulization every 6 (six) hours as needed for Wheezing or Shortness of Breath. Rescue 90 mL 0 Past Month    aspirin (ECOTRIN) 81 MG EC tablet Take 1 tablet (81 mg total) by mouth once daily. 90 tablet 3 Past Week    atorvastatin (LIPITOR) 80 MG tablet Take 1 tablet (80 mg total) by mouth once daily. 90 tablet 3 6/22/2023    cyanocobalamin, vitamin B-12, 5,000 mcg Subl Place 1 tablet under the tongue once daily.   6/22/2023    docusate sodium (COLACE) 100 MG capsule Take 200 mg by mouth every evening.   6/22/2023    ferrous gluconate (FERGON) 324 MG tablet Take 1 tablet (324 mg total) by mouth daily with breakfast. 90 tablet 1 6/22/2023    fludrocortisone (FLORINEF) 0.1 mg Tab Take a half-tablet (50 mcg) by mouth once daily 15 tablet 5 6/22/2023    FLUoxetine 20 MG capsule Take 3 capsules (60 mg total) by mouth once daily. 270 capsule 3 6/22/2023    fluticasone propionate (FLONASE) 50 mcg/actuation nasal spray 2 sprays (100 mcg total) by Each Nostril route once daily. 16 g 0 6/22/2023    furosemide (LASIX) 40 MG tablet Take 1 tablet (40 mg total) by mouth 2 (two) times a day. 90 tablet 3 6/22/2023    HYDROcodone-acetaminophen (NORCO)  mg per tablet Take 1 tablet by mouth every 6 (six) hours as needed for breakthrough pain.   6/22/2023    hydrocortisone (CORTEF) 10 MG Tab Take 1 tablet (10 mg total) by mouth every evening. 30 tablet 5 6/22/2023    hydrOXYzine (ATARAX) 50 MG tablet Take 1 tablet (50 mg total) by mouth every 4 (four) hours as needed for Anxiety. 30 tablet 0 Past Month    intrathecal pain pump compound Hydromorphone (7.5 mg/mL) infusion at 8.59 mg/day (0.3578 mg/hr) out of a total reservoir volume of 40 mL  Pump filled monthly   6/23/2023    ketorolac (TORADOL) 10 mg tablet Take 10 mg by mouth daily as needed.   6/22/2023    levothyroxine (SYNTHROID) 150 MCG tablet Take 1 tablet (150 mcg total)  by mouth before breakfast. 30 tablet 11 6/22/2023    liothyronine (CYTOMEL) 25 MCG Tab Take 1 tablet (25 mcg total) by mouth once daily. 30 tablet 11 6/22/2023    ondansetron (ZOFRAN-ODT) 4 MG TbDL Take 4 mg by mouth every 4 to 6 hours as needed.   Past Month    pantoprazole (PROTONIX) 40 MG tablet Take 1 tablet (40 mg total) by mouth once daily. 90 tablet 3 6/22/2023    potassium chloride (MICRO-K) 10 MEQ CpSR Take 2 capsules (20 mEq total) by mouth once daily. 60 capsule 11 6/22/2023    QUEtiapine (SEROQUEL) 100 MG Tab TAKE 2 TABLETS (200 MG) BY MOUTH NIGHTLY (Patient taking differently: Take 200 mg by mouth nightly.) 180 tablet 3 6/22/2023    senna (SENNA LAX) 8.6 mg tablet Take 2 tablets by mouth 2 (two) times daily.   6/22/2023    tiotropium bromide (SPIRIVA RESPIMAT) 2.5 mcg/actuation inhaler Inhale 2 puffs into the lungs once daily. Controller 4 g 1 6/22/2023    traZODone (DESYREL) 300 MG tablet Take 1 tablet (300 mg total) by mouth every evening. 90 tablet 0 6/22/2023    LIDOcaine (LIDODERM) 5 % Place 1 patch onto the skin once daily. Remove & Discard patch within 12 hours or as directed by MD  0     nitroGLYCERIN (NITROSTAT) 0.4 MG SL tablet Place 0.4 mg under the tongue every 5 (five) minutes as needed for Chest pain.   More than a month    promethazine (PHENERGAN) 25 MG tablet Take 25 mg by mouth every 6 (six) hours as needed for Nausea.   More than a month    tiZANidine (ZANAFLEX) 4 MG tablet Take 4 mg by mouth 2 (two) times daily as needed.   More than a month       Physical Exam:    Vital Signs:   Vitals:    06/23/23 0904   BP: (!) 182/63   Pulse:    Resp:    Temp:        General Appearance: Well appearing in no acute distress  Abdomen: Soft, non tender, non distended with normal bowel sounds, no masses      Labs:  Lab Results   Component Value Date    WBC 5.31 05/29/2023    HGB 10.6 (L) 05/29/2023    HCT 35.3 (L) 05/29/2023     05/29/2023    CHOL 122 05/22/2023    TRIG 73 05/22/2023    HDL 47  05/22/2023    ALT 7 (L) 05/24/2023    AST 13 05/24/2023     05/30/2023    K 3.2 (L) 05/30/2023    CL 96 05/30/2023    CREATININE 1.1 05/30/2023    BUN 14 05/30/2023    CO2 29 05/30/2023    TSH 3.297 04/28/2023    INR 1.0 01/14/2021    HGBA1C 5.9 (H) 07/28/2022       I have explained the risks and benefits of this endoscopic procedure to the patient including but not limited to bleeding, inflammation, infection, perforation, and death.      Juaquin Barry MD

## 2023-06-26 ENCOUNTER — DOCUMENT SCAN (OUTPATIENT)
Dept: HOME HEALTH SERVICES | Facility: HOSPITAL | Age: 68
End: 2023-06-26
Payer: MEDICARE

## 2023-06-26 ENCOUNTER — OUTPATIENT CASE MANAGEMENT (OUTPATIENT)
Dept: ADMINISTRATIVE | Facility: OTHER | Age: 68
End: 2023-06-26
Payer: MEDICARE

## 2023-06-26 PROBLEM — N39.0 UTI (URINARY TRACT INFECTION): Chronic | Status: RESOLVED | Noted: 2021-06-03 | Resolved: 2023-06-26

## 2023-06-26 NOTE — PROGRESS NOTES
Outpatient Care Management  Plan of Care Follow Up Visit    Patient: Tasha Hawley  MRN: [id number]  Date of Service: 06/26/2023  Completed by: Michelle Box RN  Referral Date: 04/17/2023    Reason for Visit   Patient presents with    OPCM RN Follow Up Call       Brief Summary: Patient's spouse, Dangelo, states that the patient has been having pain at her suprapubic catheter site.  He states that the last time it was removed they had to yank really hard to get it out and it caused her to have a lot of discomfort.  He states she is still wearing her 4L/NC oxygen all the time.  He states that the patient's leg swelling has gone down a little bit and she gets weighed a couple times a week by the  nurse.  Her last weights have been 205-206.  He states her legs are not weeping anymore and the sores have healed.  He states the signs and symptoms of CHF he knows is difficulty breathing and swelling.  Patient spouse, Dangelo, states that the patient couldn't eat after her EGD and could only drink.  She does eat some soft foods like mashed potatoes.  He states she is getting SLP and is improving with her swallowing.  He states she drinks a lot of supplements and is drinking less soda than she used to.    CM reviewed the signs and symptoms of CHF including difficulty breathing or shortness of breath, especially with activity; persistent cough; swelling of feet, legs or abdomen; unexplained weight gain; fatigue; weakness; loss of appetite; dizziness and rapid heartbeat.  CM instructed Dangelo to notify the doctor if she develops signs and symptoms of CHF.  CM also instructed that is symptoms become severe, to go to the emergency room.  CM reviewed with Dangelo a low sodium diet and the importance of sticking to the diet.  CM also reviewed to keep up nutrition with supplements until she is able to swallow food fully again.  Dangelo agrees to a follow up phone call in four weeks.    Next steps:   Follow up phone call in four  weeks  Follow up if weighing herself daily and logging  Follow up on 3 lb/5 lb rule  Follow up if eating processed meats  Follow up if received educational materials  Follow up on low sodium diet  Follow up on S&S of CHF  Follow up if eating again after EGD  Educate on VTE

## 2023-06-27 ENCOUNTER — OFFICE VISIT (OUTPATIENT)
Dept: INTERNAL MEDICINE | Facility: CLINIC | Age: 68
End: 2023-06-27
Payer: MEDICAID

## 2023-06-27 VITALS
WEIGHT: 214.5 LBS | BODY MASS INDEX: 36.62 KG/M2 | DIASTOLIC BLOOD PRESSURE: 68 MMHG | SYSTOLIC BLOOD PRESSURE: 90 MMHG | HEIGHT: 64 IN | OXYGEN SATURATION: 97 % | HEART RATE: 58 BPM

## 2023-06-27 DIAGNOSIS — Z99.81 DEPENDENCE ON CONTINUOUS SUPPLEMENTAL OXYGEN: ICD-10-CM

## 2023-06-27 DIAGNOSIS — E03.9 HYPOTHYROIDISM (ACQUIRED): ICD-10-CM

## 2023-06-27 DIAGNOSIS — I89.0 LYMPHEDEMA: Primary | ICD-10-CM

## 2023-06-27 DIAGNOSIS — G89.4 CHRONIC PAIN SYNDROME: Chronic | ICD-10-CM

## 2023-06-27 DIAGNOSIS — N18.31 STAGE 3A CHRONIC KIDNEY DISEASE: ICD-10-CM

## 2023-06-27 DIAGNOSIS — M47.26 OSTEOARTHRITIS OF SPINE WITH RADICULOPATHY, LUMBAR REGION: Chronic | ICD-10-CM

## 2023-06-27 DIAGNOSIS — K59.03 DRUG-INDUCED CONSTIPATION: Chronic | ICD-10-CM

## 2023-06-27 LAB
FINAL PATHOLOGIC DIAGNOSIS: NORMAL
GROSS: NORMAL
Lab: NORMAL

## 2023-06-27 PROCEDURE — 3288F PR FALLS RISK ASSESSMENT DOCUMENTED: ICD-10-PCS | Mod: CPTII,S$GLB,, | Performed by: INTERNAL MEDICINE

## 2023-06-27 PROCEDURE — 99215 OFFICE O/P EST HI 40 MIN: CPT | Mod: S$GLB,,, | Performed by: INTERNAL MEDICINE

## 2023-06-27 PROCEDURE — 99213 OFFICE O/P EST LOW 20 MIN: CPT | Mod: PBBFAC | Performed by: INTERNAL MEDICINE

## 2023-06-27 PROCEDURE — 3008F PR BODY MASS INDEX (BMI) DOCUMENTED: ICD-10-PCS | Mod: CPTII,S$GLB,, | Performed by: INTERNAL MEDICINE

## 2023-06-27 PROCEDURE — 1125F PR PAIN SEVERITY QUANTIFIED, PAIN PRESENT: ICD-10-PCS | Mod: CPTII,S$GLB,, | Performed by: INTERNAL MEDICINE

## 2023-06-27 PROCEDURE — 1125F AMNT PAIN NOTED PAIN PRSNT: CPT | Mod: CPTII,S$GLB,, | Performed by: INTERNAL MEDICINE

## 2023-06-27 PROCEDURE — 99999 PR PBB SHADOW E&M-EST. PATIENT-LVL III: ICD-10-PCS | Mod: PBBFAC,,, | Performed by: INTERNAL MEDICINE

## 2023-06-27 PROCEDURE — 1160F PR REVIEW ALL MEDS BY PRESCRIBER/CLIN PHARMACIST DOCUMENTED: ICD-10-PCS | Mod: CPTII,,, | Performed by: INTERNAL MEDICINE

## 2023-06-27 PROCEDURE — 99999 PR PBB SHADOW E&M-EST. PATIENT-LVL III: CPT | Mod: PBBFAC,,, | Performed by: INTERNAL MEDICINE

## 2023-06-27 PROCEDURE — 3008F BODY MASS INDEX DOCD: CPT | Mod: CPTII,S$GLB,, | Performed by: INTERNAL MEDICINE

## 2023-06-27 PROCEDURE — 3078F DIAST BP <80 MM HG: CPT | Mod: CPTII,S$GLB,, | Performed by: INTERNAL MEDICINE

## 2023-06-27 PROCEDURE — 99215 PR OFFICE/OUTPT VISIT, EST, LEVL V, 40-54 MIN: ICD-10-PCS | Mod: S$GLB,,, | Performed by: INTERNAL MEDICINE

## 2023-06-27 PROCEDURE — 3074F PR MOST RECENT SYSTOLIC BLOOD PRESSURE < 130 MM HG: ICD-10-PCS | Mod: CPTII,S$GLB,, | Performed by: INTERNAL MEDICINE

## 2023-06-27 PROCEDURE — 3288F FALL RISK ASSESSMENT DOCD: CPT | Mod: CPTII,S$GLB,, | Performed by: INTERNAL MEDICINE

## 2023-06-27 PROCEDURE — 1160F RVW MEDS BY RX/DR IN RCRD: CPT | Mod: CPTII,,, | Performed by: INTERNAL MEDICINE

## 2023-06-27 PROCEDURE — 1159F MED LIST DOCD IN RCRD: CPT | Mod: CPTII,S$GLB,, | Performed by: INTERNAL MEDICINE

## 2023-06-27 PROCEDURE — 1100F PR PT FALLS ASSESS DOC 2+ FALLS/FALL W/INJURY/YR: ICD-10-PCS | Mod: CPTII,S$GLB,, | Performed by: INTERNAL MEDICINE

## 2023-06-27 PROCEDURE — 3074F SYST BP LT 130 MM HG: CPT | Mod: CPTII,S$GLB,, | Performed by: INTERNAL MEDICINE

## 2023-06-27 PROCEDURE — 1100F PTFALLS ASSESS-DOCD GE2>/YR: CPT | Mod: CPTII,S$GLB,, | Performed by: INTERNAL MEDICINE

## 2023-06-27 PROCEDURE — 3078F PR MOST RECENT DIASTOLIC BLOOD PRESSURE < 80 MM HG: ICD-10-PCS | Mod: CPTII,S$GLB,, | Performed by: INTERNAL MEDICINE

## 2023-06-27 PROCEDURE — 1159F PR MEDICATION LIST DOCUMENTED IN MEDICAL RECORD: ICD-10-PCS | Mod: CPTII,S$GLB,, | Performed by: INTERNAL MEDICINE

## 2023-06-27 RX ORDER — BUTALBITAL, ACETAMINOPHEN AND CAFFEINE 50; 325; 40 MG/1; MG/1; MG/1
1 TABLET ORAL DAILY PRN
COMMUNITY
Start: 2023-06-15

## 2023-06-27 NOTE — PROGRESS NOTES
Subjective:       Patient ID: Tasha Hawley is a 67 y.o. female.    Chief Complaint:   Hospital Follow Up    HPI - Ms Hawley came with her daughter and for pill bottles today.  Since our last visit in May, she has had an ER visit for weeping lymphedema and multiple specialist visits.  She also had an EGD and wants to discuss the findings there.  She has difficulty swallowing food and medications.  Endocrinology states that she does not have adrenal insufficiency, and he is tapering off her steroids.  She needs some sort of note from me justifying her home oxygen - sats were in high 80's in April 2023 off oxygen.  She is not a smoker.  She is compliant with her non-invasive ventilatory and is benefitting from it.  I recommend that she continue the use of it, as it helps decrease the chance of readmission.    PMH:  Neurogenic bladder, with recurrent UTI's. Followed by urogyn (Milena).  Suprapubic catheter  CAD, with ACS in Jan 2016 - subtot mid LAD lesion without PCI; ischemic CM with EF 40% and chronic LE edema. Followed by [facility]ology  Chronic insomnia  Lymphedema bilateral LE's  Intermittent nausea  Chronic low back pain with narcotic use.  Indwelling narcotic pump  History CVA with dysarthria and swallowing difficulty  Hypothyroidism, stable  CECI, not on CPAP  Anxiety and Depression  Chronic constipation, d/t ongoing narcotic use.  Dependent on home oxygen     Meds: Reviewed and reconciled in EPIC with patient during visit today.     Review of Systems   Constitutional:  Negative for fever.   HENT:  Negative for congestion.    Respiratory:  Positive for shortness of breath.    Cardiovascular:  Positive for leg swelling.   Gastrointestinal:  Positive for constipation.   Genitourinary:  Positive for difficulty urinating.   Musculoskeletal:  Positive for arthralgias.   Skin:  Negative for rash.   Neurological:  Negative for headaches.   Psychiatric/Behavioral:  The patient is nervous/anxious.       Objective:      Physical Exam  Constitutional:       Appearance: She is obese.   HENT:      Head: Normocephalic and atraumatic.   Pulmonary:      Effort: Pulmonary effort is normal.   Neurological:      Mental Status: She is alert. Mental status is at baseline.       Assessment:       1. Lymphedema    2. Hypothyroidism (acquired)    3. Chronic pain syndrome    4. Drug-induced constipation    5. Osteoarthritis of spine with radiculopathy, lumbar region    6. Stage 3a chronic kidney disease    7. Dependence on continuous supplemental oxygen        Plan:       Tasha was seen today for hospital follow up.    Diagnoses and all orders for this visit:    Lymphedema - per daughter, this is improved.  I can not tell any difference.  Try elevation.  Hopefully coming off of the steroids will help with the swelling.    Hypothyroidism (acquired) - she is on Cytomel and Synthroid.  Seeing Endocrinology.  I would love to get her off the Cytomel.  She has such polypharmacy that we should be able to manage her without Cytomel.    Chronic pain syndrome - stable on narcotics per an outside physician    Drug-induced constipation - I gave a refill of Colace.    Osteoarthritis of spine with radiculopathy, lumbar region - stable, with chronic pain.  Continue present care    Stage 3a chronic kidney disease - stable    Dependence on continuous supplemental oxygen - stable.  I will fill out paperwork if she gets it to me.  Her qualification is based on saturations of 88-89% in April 2023 off oxygen    Rtc 3 months    PAPO Hodges MD MPH  Staff Internist

## 2023-06-28 ENCOUNTER — TELEPHONE (OUTPATIENT)
Dept: PULMONOLOGY | Facility: CLINIC | Age: 68
End: 2023-06-28
Payer: MEDICARE

## 2023-06-28 NOTE — TELEPHONE ENCOUNTER
----- Message from Emaalbert Alcocer sent at 6/28/2023  8:12 AM CDT -----  Regarding: Late to appt this morning  Contact: [phone]  Pt calling because she will be late to appt. Pt states she is bout 20 minutes away. Please call pt if reschedule is needed

## 2023-06-28 NOTE — TELEPHONE ENCOUNTER
----- Message from Susi Esquivel sent at 6/28/2023  7:59 AM CDT -----  Regarding: Late Arrival  Contact: Patient  Patient I requesting a cll back in reference to appt this morning   Patient stated she will be late to appt and would like a call back from office   Patient stated she will not arrive until about 8:30   Please Assist     Patient can be reached at [phone]

## 2023-06-28 NOTE — TELEPHONE ENCOUNTER
Patient arrived 1 hour late for her appointment, offered to reschedule appointment patient refused.

## 2023-06-28 NOTE — TELEPHONE ENCOUNTER
Patient arrived 1 hour late for appointment. Patient offered to reschedule appointment, patient refused.

## 2023-06-29 ENCOUNTER — TELEPHONE (OUTPATIENT)
Dept: UROLOGY | Facility: CLINIC | Age: 68
End: 2023-06-29
Payer: MEDICARE

## 2023-06-29 ENCOUNTER — OFFICE VISIT (OUTPATIENT)
Dept: NEUROSURGERY | Facility: CLINIC | Age: 68
End: 2023-06-29
Payer: MEDICARE

## 2023-06-29 DIAGNOSIS — Z98.890 S/P INSERTION OF INTRATHECAL PUMP: Primary | Chronic | ICD-10-CM

## 2023-06-29 PROCEDURE — 99215 OFFICE O/P EST HI 40 MIN: CPT | Mod: 95,,, | Performed by: PHYSICIAN ASSISTANT

## 2023-06-29 PROCEDURE — 99215 PR OFFICE/OUTPT VISIT, EST, LEVL V, 40-54 MIN: ICD-10-PCS | Mod: 95,,, | Performed by: PHYSICIAN ASSISTANT

## 2023-06-29 NOTE — PROGRESS NOTES
Neurosurgery  History & Physical    SUBJECTIVE:   The patient location is: [place]  The chief complaint leading to consultation is:  intrathecal pump at end of life    Visit type: audiovisual    Face to Face time with patient: 10 minutes  30 minutes of total time spent on the encounter, which includes face to face time and non-face to face time preparing to see the patient (eg, review of tests), Obtaining and/or reviewing separately obtained history, Documenting clinical information in the electronic or other health record, Independently interpreting results (not separately reported) and communicating results to the patient/family/caregiver, or Care coordination (not separately reported).         Each patient to whom he or she provides medical services by telemedicine is:  (1) informed of the relationship between the physician and patient and the respective role of any other health care provider with respect to management of the patient; and (2) notified that he or she may decline to receive medical services by telemedicine and may withdraw from such care at any time.      Chief Complaint: intrathecal pump at end of life    History of Present Illness: Tasha Hawley is a 67 y.o. female who was referred to me by Dr. Nigel Hillman with [facility] for opioid pump replacement. Ms. Hawley has a long standing history of chronic neck and back pain. She has undergone previous cervical fusion and several lumbar surgeries. She eventually had an intrathecal opioid pump placed that has brought her good relief of her pain. Her pump is now approaching its end of life, and she would like to discuss pump replacement. Her pump has been working well without any issues to suggest a problem with the catheter. Her opioid pump contains hydromorphone per patient report. Last refill was on 6/15, refills typically last her about 2 months. Patient's past medically history is significant for CHF, hypothyroidism,  sleep apnea, and carotid artery occlusion. She takes ASA 81 mg daily.      Review of patient's allergies indicates:   Allergen Reactions    (d)-limonene flavor      Other reaction(s): difficult intubation  Other reaction(s): Difficulty breathing    Bactrim [sulfamethoxazole-trimethoprim] Anaphylaxis    Benadryl [diphenhydramine hcl] Shortness Of Breath    Fentanyl Itching, Nausea And Vomiting and Swelling             Imitrex [sumatriptan succinate] Shortness Of Breath    Percocet [oxycodone-acetaminophen] Shortness Of Breath    Topamax [topiramate] Shortness Of Breath    Vancomycin Anaphylaxis     Rash    Butorphanol tartrate     Darvocet a500 [propoxyphene n-acetaminophen]      Other reaction(s): Difficulty breathing    Evening primrose (oenothera biennis)     Lyrica [pregabalin] Other (See Comments)     tremors    White petrolatum-zinc     Zinc oxide-white petrolatum      Other reaction(s): Difficulty breathing    Latex, natural rubber Itching and Rash    Phenytoin Rash and Other (See Comments)     Trouble breathing       Current Outpatient Medications   Medication Sig Dispense Refill    albuterol-ipratropium (DUO-NEB) 2.5 mg-0.5 mg/3 mL nebulizer solution Take 3 mLs by nebulization every 6 (six) hours as needed for Wheezing or Shortness of Breath. Rescue 90 mL 0    aspirin (ECOTRIN) 81 MG EC tablet Take 1 tablet (81 mg total) by mouth once daily. 90 tablet 3    atorvastatin (LIPITOR) 80 MG tablet Take 1 tablet (80 mg total) by mouth once daily. 90 tablet 3    butalbital-acetaminophen-caffeine -40 mg (FIORICET, ESGIC) -40 mg per tablet Take 1 tablet by mouth daily as needed.      cyanocobalamin, vitamin B-12, 5,000 mcg Subl Place 1 tablet under the tongue once daily.      docusate sodium (COLACE) 100 MG capsule Take 200 mg by mouth every evening.      ferrous gluconate (FERGON) 324 MG tablet Take 1 tablet (324 mg total) by mouth daily with breakfast. 90 tablet 1    fludrocortisone (FLORINEF) 0.1 mg Tab  Take a half-tablet (50 mcg) by mouth once daily 15 tablet 5    FLUoxetine 20 MG capsule Take 3 capsules (60 mg total) by mouth once daily. 270 capsule 3    fluticasone propionate (FLONASE) 50 mcg/actuation nasal spray 2 sprays (100 mcg total) by Each Nostril route once daily. 16 g 0    furosemide (LASIX) 40 MG tablet Take 1 tablet (40 mg total) by mouth 2 (two) times a day. 90 tablet 3    HYDROcodone-acetaminophen (NORCO)  mg per tablet Take 1 tablet by mouth every 6 (six) hours as needed for breakthrough pain.      hydrocortisone (CORTEF) 10 MG Tab Take 1 tablet (10 mg total) by mouth every evening. 30 tablet 5    hydrOXYzine (ATARAX) 50 MG tablet Take 1 tablet (50 mg total) by mouth every 4 (four) hours as needed for Anxiety. 30 tablet 0    intrathecal pain pump compound Hydromorphone (7.5 mg/mL) infusion at 8.59 mg/day (0.3578 mg/hr) out of a total reservoir volume of 40 mL  Pump filled monthly      ketorolac (TORADOL) 10 mg tablet Take 10 mg by mouth daily as needed.      levothyroxine (SYNTHROID) 150 MCG tablet Take 1 tablet (150 mcg total) by mouth before breakfast. 30 tablet 11    LIDOcaine (LIDODERM) 5 % Place 1 patch onto the skin once daily. Remove & Discard patch within 12 hours or as directed by MD  0    liothyronine (CYTOMEL) 25 MCG Tab Take 1 tablet (25 mcg total) by mouth once daily. 30 tablet 11    mupirocin (BACTROBAN) 2 % ointment Apply topically 2 (two) times daily. Apply small amount to each nostril twice a day for 5 days as instructed. for 5 days 1 each 0    nitroGLYCERIN (NITROSTAT) 0.4 MG SL tablet Place 0.4 mg under the tongue every 5 (five) minutes as needed for Chest pain.      ondansetron (ZOFRAN-ODT) 4 MG TbDL Take 4 mg by mouth every 4 to 6 hours as needed.      pantoprazole (PROTONIX) 40 MG tablet Take 1 tablet (40 mg total) by mouth once daily. 90 tablet 3    potassium chloride (MICRO-K) 10 MEQ CpSR Take 2 capsules (20 mEq total) by mouth once daily. 60 capsule 11    promethazine  (PHENERGAN) 25 MG tablet Take 25 mg by mouth every 6 (six) hours as needed for Nausea.      QUEtiapine (SEROQUEL) 100 MG Tab TAKE 2 TABLETS (200 MG) BY MOUTH NIGHTLY (Patient taking differently: Take 200 mg by mouth nightly.) 180 tablet 3    senna (SENNA LAX) 8.6 mg tablet Take 2 tablets by mouth 2 (two) times daily.      tiotropium bromide (SPIRIVA RESPIMAT) 2.5 mcg/actuation inhaler Inhale 2 puffs into the lungs once daily. Controller 4 g 1    tiZANidine (ZANAFLEX) 4 MG tablet Take 4 mg by mouth 2 (two) times daily as needed.      traZODone (DESYREL) 300 MG tablet Take 1 tablet (300 mg total) by mouth every evening. 90 tablet 0     No current facility-administered medications for this visit.       Past Medical History:   Diagnosis Date    Anticoagulant long-term use     Anxiety     Arthritis     Bilateral lower extremity edema     severe chronic    Carotid artery occlusion     Cataract     CHF (congestive heart failure)     Coronary artery disease     subtotalled LAD with collateral    Depression     Fever blister     Hard of hearing     Hypokalemia 1/9/2023    Hyponatremia 2/4/2022    Hypothyroid     Iron deficiency anemia     Lumbar radiculopathy     with chronic pain    Ocular migraine     Other emphysema 5/22/2023    Renal disorder     Sleep apnea     cpap     Past Surgical History:   Procedure Laterality Date    ADENOIDECTOMY      BACK SURGERY      x 12    CARDIAC CATHETERIZATION  2016    subtotalled LAD with right to left collaterals    CATARACT EXTRACTION W/  INTRAOCULAR LENS IMPLANT Left     Dr Coleman     CYSTOSCOPIC LITHOLAPAXY N/A 6/27/2019    Procedure: CYSTOLITHOLAPAXY;  Surgeon: Shireen Mayo MD;  Location: [facility] OR [address] [place] [address];  Service: Urology;  Laterality: N/A;    CYSTOSCOPIC LITHOLAPAXY N/A 9/3/2019    Procedure: CYSTOLITHOLAPAXY;  Surgeon: Shireen Mayo MD;  Location: [facility] OR [address] [place] [address];  Service: Urology;  Laterality: N/A;    CYSTOSCOPY N/A 7/13/2021    Procedure: CYSTOSCOPY;  Surgeon: Shireen  NIEVES Mayo MD;  Location: [facility] OR 1ST FLR;  Service: Urology;  Laterality: N/A;    CYSTOSCOPY  11/16/2021    Procedure: CYSTOSCOPY;  Surgeon: Shireen Mayo MD;  Location: [facility] OR [facility] FLR;  Service: Urology;;    CYSTOSCOPY  7/19/2022    Procedure: CYSTOSCOPY;  Surgeon: Shireen Mayo MD;  Location: [facility] OR [facility] FLR;  Service: Urology;;    CYSTOSCOPY WITH INJECTION OF PERIURETHRAL BULKING AGENT  7/19/2022    Procedure: CYSTOSCOPY, WITH PERIURETHRAL BULKING AGENT INJECTION-MACROPLASTIQUE;  Surgeon: Shireen Mayo MD;  Location: [facility] OR [facility] FLR;  Service: Urology;;    CYSTOSCOPY WITH INJECTION OF PERIURETHRAL BULKING AGENT N/A 3/28/2023    Procedure: CYSTOSCOPY, WITH PERIURETHRAL BULKING AGENT INJECTION;  Surgeon: Shireen Mayo MD;  [facility];  Service: Urology;  Laterality: N/A;  [place]    CYSTOSCOPY,WITH BOTULINUM TOXIN INJECTION N/A 12/13/2022    Procedure: CYSTOSCOPY,WITH BOTULINUM TOXIN INJECTION;  Surgeon: Shireen Mayo MD;  [facility];  Service: Urology;  Laterality: N/A;  300 U    CYSTOSCOPY,WITH BOTULINUM TOXIN INJECTION N/A 3/28/2023    Procedure: CYSTOSCOPY,WITH BOTULINUM TOXIN INJECTION;  Surgeon: Shireen Mayo MD;  [facility];  Service: Urology;  Laterality: N/A;  45 MIN.    300 UNITS    ESOPHAGOGASTRODUODENOSCOPY N/A 5/23/2018    Procedure: ESOPHAGOGASTRODUODENOSCOPY (EGD);  Surgeon: Prince Vance MD;  Location: [facility] ENDO (4TH FLR);  Service: Endoscopy;  Laterality: N/A;  r/s 'd per Dr. Vance due to family emergency- ER    ESOPHAGOGASTRODUODENOSCOPY N/A 6/23/2023    Procedure: EGD (ESOPHAGOGASTRODUODENOSCOPY);  Surgeon: Juaquin Barry MD;  Location: [facility] ENDO (2ND FLR);  Service: Endoscopy;  Laterality: N/A;  Refferal: PRINCE VANCE  tele enc 5/18/23  dx: history of a Nissen fundoplication  Additional Scheduling Information:  On home oxygen 2nd floor for airway protection also with a pain pump elevated BMI close to  40   Prep Gastroparesis   ins    HYSTERECTOMY  1975    endometriosis    INJECTION OF BOTULINUM TOXIN TYPE A  7/13/2021    Procedure: INJECTION, BOTULINUM TOXIN, 200units;  Surgeon: Shireen Mayo MD;  Location: [facility] OR [address] [place] [address];  Service: Urology;;    INJECTION OF BOTULINUM TOXIN TYPE A  11/16/2021    Procedure: INJECTION, BOTULINUM TOXIN, 200units;  Surgeon: Shireen Mayo MD;  [facility];  Service: Urology;;    INJECTION OF BOTULINUM TOXIN TYPE A  7/19/2022    Procedure: INJECTION, BOTULINUM TOXIN, 300 units ;  Surgeon: Shireen Mayo MD;  [facility];  Service: Urology;;    INSERTION, SUPRAPUBIC CATHETER N/A 12/13/2022    Procedure: INSERTION, SUPRAPUBIC CATHETER;  Surgeon: Shireen Mayo MD;  Location: [facility] OR [address] [place] [address];  Service: Urology;  Laterality: N/A;  exchange    pain pump placement      SQ Dilaudid Pump managed by Dr. Hillman, Pain Management    REMOVAL OF BONE SPUR OF FOOT Bilateral 9/16/2022    Procedure: EXCISION ARTHRITIC BONE, BILATERAL FOOT;  Surgeon: Adam Mcguire DPM;  Location: [facility] OR;  Service: Podiatry;  Laterality: Bilateral;    REPLACEMENT OF CATHETER N/A 10/31/2019    Procedure: REPLACEMENT, CATHETER-SUPRAPUBIC;  Surgeon: Shireen Mayo MD;  Location: [facility] OR [address] [place] [address];  Service: Urology;  Laterality: N/A;    SPINAL CORD STIMULATOR REMOVAL      before [place]    SPINE SURGERY  5-13-13    CERVICAL FUSION    TONSILLECTOMY       Family History       Problem Relation (Age of Onset)    Cancer Mother (55), Father    Cirrhosis Paternal Aunt, Paternal Uncle    Colon cancer Maternal Uncle    Esophageal cancer Father    Heart disease Maternal Uncle    Liver disease Paternal Aunt, Paternal Uncle    No Known Problems Brother, Brother, Sister, Maternal Aunt, Maternal Grandfather, Paternal Grandmother, Paternal Grandfather    Parkinsonism Maternal Grandmother    Tremor Maternal Grandmother          Social History     Socioeconomic History    Marital  "status:    Tobacco Use    Smoking status: Never    Smokeless tobacco: Never   Substance and Sexual Activity    Alcohol use: Never    Drug use: Yes     Types: Marijuana     Comment: "medical marijuana gummies"    Sexual activity: Never     Partners: Male     Social Determinants of Health     Financial Resource Strain: Low Risk     Difficulty of Paying Living Expenses: Not hard at all   Food Insecurity: No Food Insecurity    Worried About Running Out of Food in the Last Year: Never true    Ran Out of Food in the Last Year: Never true   Transportation Needs: No Transportation Needs    Lack of Transportation (Medical): No    Lack of Transportation (Non-Medical): No   Physical Activity: Insufficiently Active    Days of Exercise per Week: 4 days    Minutes of Exercise per Session: 10 min   Stress: No Stress Concern Present    Feeling of Stress : Only a little   Social Connections: Moderately Isolated    Frequency of Communication with Friends and Family: More than three times a week    Frequency of Social Gatherings with Friends and Family: Three times a week    Attends Tenriism Services: Never    Active Member of Clubs or Organizations: No    Attends Club or Organization Meetings: Never    Marital Status:    Housing Stability: Low Risk     Unable to Pay for Housing in the Last Year: No    Number of Places Lived in the Last Year: 1    Unstable Housing in the Last Year: No       Review of Systems   Constitutional:  Negative for activity change and fever.   HENT:  Negative for ear pain and trouble swallowing.    Eyes:  Negative for photophobia and visual disturbance.   Respiratory:  Negative for shortness of breath and wheezing.    Cardiovascular:  Negative for chest pain.   Gastrointestinal:  Negative for abdominal pain, nausea and vomiting.   Genitourinary:  Negative for dysuria and hematuria.   Musculoskeletal:  Positive for back pain and neck pain.   Skin:  Negative for wound.   Neurological:  Positive " for headaches. Negative for dizziness, seizures, weakness and numbness.   Psychiatric/Behavioral:  Negative for confusion.      OBJECTIVE:     Vital Signs     There is no height or weight on file to calculate BMI.      Neurosurgery Physical Exam  General: well developed, well nourished, no distress.   Head: normocephalic  Neurologic: Alert and oriented. Thought content appropriate.  GCS: Motor: 6/Verbal: 5/Eyes: 4 GCS Total: 15  Mental Status: Awake, Alert, Oriented x 4  Language: No aphasia  Speech: No dysarthria  Cranial nerves: CN II-XII grossly intact.   Eyes: EOMI appear grossly intact  Pulmonary: no signs of respiratory distress  Motor Strength:Moves all extremities spontaneously with good tone. BUE and BLE are at least 3/5. No abnormal movements seen.   Finger-to-nose: intact bilaterally   Pronator drift: absent bilaterally    Well healed incision to RLQ of abdomen     Diagnostic Results:  No pertinent studies available for review   ASSESSMENT/PLAN:     Tasha Hawley is a 67 y.o. female with chronic pain syndrome s/p intrathecal pain pump. Her pain pump is approaching its end of life. She recently had a refill of the pump on 6/15. We will plan for replacement of the pump in the near future. At that time, we will transfer the contents of her old pump into the new pump. The procedure was discussed in detail including R/B/A. She voiced understanding. All of her questions were answered. She will need to hold her ASA prior to the procedure. We will have her see Dr. Ortiz for all of the necessary pre op testing. She and her step daughter, Jaycee, know they can call the clinic prior to surgery with any questions or concerns.

## 2023-06-29 NOTE — TELEPHONE ENCOUNTER
Spoke to the daughter. Appt was offered with the NP, but unable to make those times offered due to other appointments. I directly called Dr. Mayo. She stated that she sign a order for the  nurse to collected a urine culture sample.     I called the patient back at 4:23 pm and was transferred to the . A message was left. Patient was asked to call the office back regarding the urine culture.    DONALD Gonzalez      ----- Message from Zoe Mcgarry LPN sent at 6/29/2023  4:23 PM CDT -----    ----- Message -----  From: Bibi Mueller  Sent: 6/29/2023  10:13 AM CDT  To: [facility] Staff    Tasha Hawley calling regarding Patient Advice for stating she has called everyday since Friday to speak to the doctor to inform that she is having burning and pressure in the vaginal area and have not gotten a call back, call back to assist, [phone]

## 2023-06-30 ENCOUNTER — PATIENT MESSAGE (OUTPATIENT)
Dept: NEUROSURGERY | Facility: CLINIC | Age: 68
End: 2023-06-30
Payer: MEDICARE

## 2023-06-30 ENCOUNTER — PATIENT MESSAGE (OUTPATIENT)
Dept: ALLERGY | Facility: CLINIC | Age: 68
End: 2023-06-30

## 2023-06-30 ENCOUNTER — OFFICE VISIT (OUTPATIENT)
Dept: PODIATRY | Facility: CLINIC | Age: 68
End: 2023-06-30
Payer: MEDICARE

## 2023-06-30 ENCOUNTER — OFFICE VISIT (OUTPATIENT)
Dept: ALLERGY | Facility: CLINIC | Age: 68
End: 2023-06-30
Payer: MEDICARE

## 2023-06-30 VITALS
BODY MASS INDEX: 37.09 KG/M2 | SYSTOLIC BLOOD PRESSURE: 98 MMHG | HEART RATE: 76 BPM | WEIGHT: 216.06 LBS | DIASTOLIC BLOOD PRESSURE: 65 MMHG

## 2023-06-30 VITALS — WEIGHT: 216 LBS | HEIGHT: 64 IN | BODY MASS INDEX: 36.88 KG/M2

## 2023-06-30 DIAGNOSIS — M79.672 FOOT PAIN, BILATERAL: Primary | ICD-10-CM

## 2023-06-30 DIAGNOSIS — M19.90 ARTHRITIS: ICD-10-CM

## 2023-06-30 DIAGNOSIS — G89.29 OTHER CHRONIC PAIN: ICD-10-CM

## 2023-06-30 DIAGNOSIS — M79.671 FOOT PAIN, BILATERAL: Primary | ICD-10-CM

## 2023-06-30 DIAGNOSIS — R60.0 LEG EDEMA: ICD-10-CM

## 2023-06-30 DIAGNOSIS — Z88.1 ALLERGY TO ANTIBIOTIC: Primary | ICD-10-CM

## 2023-06-30 DIAGNOSIS — N39.0 RECURRENT UTI: ICD-10-CM

## 2023-06-30 PROCEDURE — 99214 PR OFFICE/OUTPT VISIT, EST, LEVL IV, 30-39 MIN: ICD-10-PCS | Mod: S$PBB,,, | Performed by: STUDENT IN AN ORGANIZED HEALTH CARE EDUCATION/TRAINING PROGRAM

## 2023-06-30 PROCEDURE — 99204 OFFICE O/P NEW MOD 45 MIN: CPT | Mod: S$GLB,,, | Performed by: PODIATRIST

## 2023-06-30 PROCEDURE — 1101F PR PT FALLS ASSESS DOC 0-1 FALLS W/OUT INJ PAST YR: ICD-10-PCS | Mod: CPTII,S$GLB,, | Performed by: PODIATRIST

## 2023-06-30 PROCEDURE — 99999 PR PBB SHADOW E&M-EST. PATIENT-LVL III: CPT | Mod: PBBFAC,,, | Performed by: PODIATRIST

## 2023-06-30 PROCEDURE — 1125F AMNT PAIN NOTED PAIN PRSNT: CPT | Mod: CPTII,S$GLB,, | Performed by: PODIATRIST

## 2023-06-30 PROCEDURE — 1160F PR REVIEW ALL MEDS BY PRESCRIBER/CLIN PHARMACIST DOCUMENTED: ICD-10-PCS | Mod: CPTII,S$GLB,, | Performed by: STUDENT IN AN ORGANIZED HEALTH CARE EDUCATION/TRAINING PROGRAM

## 2023-06-30 PROCEDURE — 3008F BODY MASS INDEX DOCD: CPT | Mod: CPTII,S$GLB,, | Performed by: STUDENT IN AN ORGANIZED HEALTH CARE EDUCATION/TRAINING PROGRAM

## 2023-06-30 PROCEDURE — 1159F MED LIST DOCD IN RCRD: CPT | Mod: CPTII,S$GLB,, | Performed by: STUDENT IN AN ORGANIZED HEALTH CARE EDUCATION/TRAINING PROGRAM

## 2023-06-30 PROCEDURE — 3074F SYST BP LT 130 MM HG: CPT | Mod: CPTII,S$GLB,, | Performed by: STUDENT IN AN ORGANIZED HEALTH CARE EDUCATION/TRAINING PROGRAM

## 2023-06-30 PROCEDURE — 3288F FALL RISK ASSESSMENT DOCD: CPT | Mod: CPTII,S$GLB,, | Performed by: PODIATRIST

## 2023-06-30 PROCEDURE — 1101F PT FALLS ASSESS-DOCD LE1/YR: CPT | Mod: CPTII,S$GLB,, | Performed by: PODIATRIST

## 2023-06-30 PROCEDURE — 99999 PR PBB SHADOW E&M-EST. PATIENT-LVL III: ICD-10-PCS | Mod: PBBFAC,,, | Performed by: PODIATRIST

## 2023-06-30 PROCEDURE — 99213 OFFICE O/P EST LOW 20 MIN: CPT | Mod: PBBFAC,PN | Performed by: PODIATRIST

## 2023-06-30 PROCEDURE — 3078F DIAST BP <80 MM HG: CPT | Mod: CPTII,S$GLB,, | Performed by: STUDENT IN AN ORGANIZED HEALTH CARE EDUCATION/TRAINING PROGRAM

## 2023-06-30 PROCEDURE — 1159F PR MEDICATION LIST DOCUMENTED IN MEDICAL RECORD: ICD-10-PCS | Mod: CPTII,S$GLB,, | Performed by: PODIATRIST

## 2023-06-30 PROCEDURE — 3008F BODY MASS INDEX DOCD: CPT | Mod: CPTII,S$GLB,, | Performed by: PODIATRIST

## 2023-06-30 PROCEDURE — 1160F RVW MEDS BY RX/DR IN RCRD: CPT | Mod: CPTII,S$GLB,, | Performed by: STUDENT IN AN ORGANIZED HEALTH CARE EDUCATION/TRAINING PROGRAM

## 2023-06-30 PROCEDURE — 99999 PR PBB SHADOW E&M-EST. PATIENT-LVL III: ICD-10-PCS | Mod: PBBFAC,,, | Performed by: STUDENT IN AN ORGANIZED HEALTH CARE EDUCATION/TRAINING PROGRAM

## 2023-06-30 PROCEDURE — 3078F PR MOST RECENT DIASTOLIC BLOOD PRESSURE < 80 MM HG: ICD-10-PCS | Mod: CPTII,S$GLB,, | Performed by: STUDENT IN AN ORGANIZED HEALTH CARE EDUCATION/TRAINING PROGRAM

## 2023-06-30 PROCEDURE — 3074F PR MOST RECENT SYSTOLIC BLOOD PRESSURE < 130 MM HG: ICD-10-PCS | Mod: CPTII,S$GLB,, | Performed by: STUDENT IN AN ORGANIZED HEALTH CARE EDUCATION/TRAINING PROGRAM

## 2023-06-30 PROCEDURE — 99204 PR OFFICE/OUTPT VISIT, NEW, LEVL IV, 45-59 MIN: ICD-10-PCS | Mod: S$GLB,,, | Performed by: PODIATRIST

## 2023-06-30 PROCEDURE — 99214 OFFICE O/P EST MOD 30 MIN: CPT | Mod: S$PBB,,, | Performed by: STUDENT IN AN ORGANIZED HEALTH CARE EDUCATION/TRAINING PROGRAM

## 2023-06-30 PROCEDURE — 3008F PR BODY MASS INDEX (BMI) DOCUMENTED: ICD-10-PCS | Mod: CPTII,S$GLB,, | Performed by: STUDENT IN AN ORGANIZED HEALTH CARE EDUCATION/TRAINING PROGRAM

## 2023-06-30 PROCEDURE — 1159F PR MEDICATION LIST DOCUMENTED IN MEDICAL RECORD: ICD-10-PCS | Mod: CPTII,S$GLB,, | Performed by: STUDENT IN AN ORGANIZED HEALTH CARE EDUCATION/TRAINING PROGRAM

## 2023-06-30 PROCEDURE — 3008F PR BODY MASS INDEX (BMI) DOCUMENTED: ICD-10-PCS | Mod: CPTII,S$GLB,, | Performed by: PODIATRIST

## 2023-06-30 PROCEDURE — 1159F MED LIST DOCD IN RCRD: CPT | Mod: CPTII,S$GLB,, | Performed by: PODIATRIST

## 2023-06-30 PROCEDURE — 3288F PR FALLS RISK ASSESSMENT DOCUMENTED: ICD-10-PCS | Mod: CPTII,S$GLB,, | Performed by: PODIATRIST

## 2023-06-30 PROCEDURE — 1125F PR PAIN SEVERITY QUANTIFIED, PAIN PRESENT: ICD-10-PCS | Mod: CPTII,S$GLB,, | Performed by: PODIATRIST

## 2023-06-30 PROCEDURE — 99999 PR PBB SHADOW E&M-EST. PATIENT-LVL III: CPT | Mod: PBBFAC,,, | Performed by: STUDENT IN AN ORGANIZED HEALTH CARE EDUCATION/TRAINING PROGRAM

## 2023-06-30 NOTE — PROGRESS NOTES
"ALLERGY & IMMUNOLOGY CLINIC - ESTABLISHED PATIENT      HISTORY OF PRESENT ILLNESS     Patient ID: Tasha Hawley is a 67 y.o. female    CC: bactrim allergy    HPI: Tasha Hawley is a 67 y.o. female with a suprapubic catheter and multiple UTIs presenting for bactrim allergy.  She was last seen by me 9/29/20 for her history of bactrim allergy. The patient declined testing at that time.  She was re-referred by Shireen Mayo MD (urology) for bactrim allergy.    She says she does not know why she is here. I explained that it was for her history of bactrim allergy, and she said "I can't take that."    Her  had a colonoscopy yesterday and has had a rough recovery. The patient was up most of the night with him. She is here with a friend.     I confirmed the info from our last visit. She said she isn't sure when the last time was that she had the bactrim, but she says she reacted to it multiple times. She confirmed that she recalls rash, shortness of breath, and nausea (one time with vomiting) within 15-30 minutes of taking a dose. She says she is not interested in testing for bactrim allergy. She never wants to receive bactrim again.    She says she has a lot of other medication allergies, but is not interested in discussing them at this time.      MEDICAL HISTORY     Vaccines:   Immunization History   Administered Date(s) Administered    COVID-19, MRNA, LN-S, PF (Pfizer) (Gray Cap) 04/05/2022    COVID-19, MRNA, LN-S, PF (Pfizer) (Purple Cap) 04/02/2021, 04/23/2021    COVID-19, mRNA, LNP-S, bivalent booster, PF (PFIZER OMICRON) 01/06/2023    Influenza 10/22/2008, 12/23/2010, 10/13/2011    Influenza (FLUAD) - Quadrivalent - Adjuvanted - PF *Preferred* (65+) 01/06/2023    Influenza - Quadrivalent 11/14/2014, 09/25/2015    Influenza - Quadrivalent - PF *Preferred* (6 months and older) 10/22/2008, 12/23/2010, 10/13/2011, 11/30/2017, 09/21/2018, 09/21/2018, 11/07/2019, 09/29/2020    PPD Test 02/10/2022, " 01/17/2023    Pneumococcal Conjugate - 13 Valent 05/29/2014, 05/29/2014, 12/29/2017    Pneumococcal Polysaccharide - 23 Valent 05/17/2018, 05/17/2018    Tdap 12/17/2015, 03/02/2017     Medical Hx:   Patient Active Problem List   Diagnosis    Generalized anxiety disorder    Sleep apnea    Iron deficiency anemia    Major depressive disorder, recurrent, mild    Chronic pain syndrome    Cervical myelopathy    SOB (shortness of breath)    Narcotic dependency, continuous    Thoracic aorta atherosclerosis    Drug-induced constipation    GERD (gastroesophageal reflux disease)    Coronary artery disease of native artery with stable angina pectoris    Neurogenic bladder    Frequent falls    Hypothyroidism (acquired)    Lymphedema    Scoliosis deformity of spine    S/P insertion of spinal cord stimulator    S/P insertion of intrathecal pump    Paresthesia of both lower extremities    Degenerative disc disease, lumbar    Osteoarthritis of spine with radiculopathy, lumbar region    Bradycardia    Bilateral carotid artery disease    Insomnia due to medical condition    Suprapubic catheter    Esophageal dysphagia    Recurrent UTI    Mixed stress and urge urinary incontinence    Inflammatory spondylopathy of lumbar region    Pure hypercholesterolemia    Chronic diastolic heart failure    Constipation due to opioid therapy    Leg pain, bilateral    Debility    History of stroke with residual deficit    Tremor    Anxiety    Calcaneal spur of left foot    Charcot ankle, unspecified laterality    Adrenal insufficiency    Acute respiratory failure with hypoxia and hypercarbia    Palliative care encounter    Severe obesity (BMI 35.0-39.9) with comorbidity    Other emphysema    Stage 3a chronic kidney disease    Intractable pain     Surgical Hx:   Past Surgical History:   Procedure Laterality Date    ADENOIDECTOMY      BACK SURGERY      x 12    CARDIAC CATHETERIZATION  2016    subtotalled LAD with right to left collaterals    CATARACT  EXTRACTION W/  INTRAOCULAR LENS IMPLANT Left     Dr Coleman     CYSTOSCOPIC LITHOLAPAXY N/A 6/27/2019    Procedure: CYSTOLITHOLAPAXY;  Surgeon: Shireen Mayo MD;  Location: [facility] OR 2ND FLR;  Service: Urology;  Laterality: N/A;    CYSTOSCOPIC LITHOLAPAXY N/A 9/3/2019    Procedure: CYSTOLITHOLAPAXY;  Surgeon: Shireen Mayo MD;  Location: [facility] OR 2ND FLR;  Service: Urology;  Laterality: N/A;    CYSTOSCOPY N/A 7/13/2021    Procedure: CYSTOSCOPY;  Surgeon: Shireen Mayo MD;  Location: [facility] OR 1ST FLR;  Service: Urology;  Laterality: N/A;    CYSTOSCOPY  11/16/2021    Procedure: CYSTOSCOPY;  Surgeon: Shireen Mayo MD;  [facility];  Service: Urology;;    CYSTOSCOPY  7/19/2022    Procedure: CYSTOSCOPY;  Surgeon: Shireen Mayo MD;  [facility] 1ST F[facility];  Service: Urology;;    CYSTOSCOPY WITH INJECTION OF PERIURETHRAL BULKING AGENT  7/19/2022    Procedure: CYSTOSCOPY, WITH PERIURETHRAL BULKING AGENT INJECTION-MACROPLASTIQUE;  Surgeon: Shireen Mayo MD;  Location: [facility] OR 1ST FLR;  Service: Urology;;    CYSTOSCOPY WITH INJECTION OF PERIURETHRAL BULKING AGENT N/A 3/28/2023    Procedure: CYSTOSCOPY, WITH PERIURETHRAL BULKING AGENT INJECTION;  Surgeon: Shireen Mayo MD;  [facility]ST F[facility];  Service: Urology;  Laterality: N/A;  [place]    CYSTOSCOPY,WITH BOTULINUM TOXIN INJECTION N/A 12/13/2022    Procedure: CYSTOSCOPY,WITH BOTULINUM TOXIN INJECTION;  Surgeon: Shireen Mayo MD;  [facility] 1ST FLR;  Service: Urology;  Laterality: N/A;  300 U    CYSTOSCOPY,WITH BOTULINUM TOXIN INJECTION N/A 3/28/2023    Procedure: CYSTOSCOPY,WITH BOTULINUM TOXIN INJECTION;  Surgeon: Shireen Mayo MD;  [facility] 1ST FLR;  Service: Urology;  Laterality: N/A;  45 MIN.    300 UNITS    ESOPHAGOGASTRODUODENOSCOPY N/A 5/23/2018    Procedure: ESOPHAGOGASTRODUODENOSCOPY (EGD);  Surgeon: Prince Vance MD;  Location: [facility] ([address] [place] [address]);  Service: Endoscopy;  Laterality: N/A;   r/s 'd per Dr. Vance due to family emergency- ER    ESOPHAGOGASTRODUODENOSCOPY N/A 6/23/2023    Procedure: EGD (ESOPHAGOGASTRODUODENOSCOPY);  Surgeon: Juaquin Barry MD;  Location: [facility] (2ND FLR);  Service: Endoscopy;  Laterality: N/A;  Refferal: ROSEANNE VANCE  Order  tele enc 5/18/23  dx: history of a Nissen fundoplication  Additional Scheduling Information:  On home oxygen 2nd floor for airway protection also with a pain pump elevated BMI close to 40   Prep Gastroparesis   ins    HYSTERECTOMY  1975    endometriosis    INJECTION OF BOTULINUM TOXIN TYPE A  7/13/2021    Procedure: INJECTION, BOTULINUM TOXIN, 200units;  Surgeon: Shireen Mayo MD;  Location: [facility] OR 1ST FLR;  Service: Urology;;    INJECTION OF BOTULINUM TOXIN TYPE A  11/16/2021    Procedure: INJECTION, BOTULINUM TOXIN, 200units;  Surgeon: Shireen Mayo MD;  [facility];  Service: Urology;;    INJECTION OF BOTULINUM TOXIN TYPE A  7/19/2022    Procedure: INJECTION, BOTULINUM TOXIN, 300 units ;  Surgeon: Shireen Mayo MD;  [facility];  Service: Urology;;    INSERTION, SUPRAPUBIC CATHETER N/A 12/13/2022    Procedure: INSERTION, SUPRAPUBIC CATHETER;  Surgeon: Shireen Mayo MD;  [facility];  Service: Urology;  Laterality: N/A;  exchange    pain pump placement      SQ Dilaudid Pump managed by Dr. Hillman, Pain Management    REMOVAL OF BONE SPUR OF FOOT Bilateral 9/16/2022    Procedure: EXCISION ARTHRITIC BONE, BILATERAL FOOT;  Surgeon: Adam Mcguire DPM;  Location: [facility] OR;  Service: Podiatry;  Laterality: Bilateral;    REPLACEMENT OF CATHETER N/A 10/31/2019    Procedure: REPLACEMENT, CATHETER-SUPRAPUBIC;  Surgeon: Shireen Mayo MD;  [facility];  Service: Urology;  Laterality: N/A;    SPINAL CORD STIMULATOR REMOVAL      before [place]    SPINE SURGERY  5-13-13    CERVICAL FUSION    TONSILLECTOMY         Medications:   Current Outpatient Medications on File  Prior to Visit   Medication Sig Dispense Refill    albuterol-ipratropium (DUO-NEB) 2.5 mg-0.5 mg/3 mL nebulizer solution Take 3 mLs by nebulization every 6 (six) hours as needed for Wheezing or Shortness of Breath. Rescue 90 mL 0    aspirin (ECOTRIN) 81 MG EC tablet Take 1 tablet (81 mg total) by mouth once daily. 90 tablet 3    atorvastatin (LIPITOR) 80 MG tablet Take 1 tablet (80 mg total) by mouth once daily. 90 tablet 3    butalbital-acetaminophen-caffeine -40 mg (FIORICET, ESGIC) -40 mg per tablet Take 1 tablet by mouth daily as needed.      cyanocobalamin, vitamin B-12, 5,000 mcg Subl Place 1 tablet under the tongue once daily.      docusate sodium (COLACE) 100 MG capsule Take 200 mg by mouth every evening.      ferrous gluconate (FERGON) 324 MG tablet Take 1 tablet (324 mg total) by mouth daily with breakfast. 90 tablet 1    fludrocortisone (FLORINEF) 0.1 mg Tab Take a half-tablet (50 mcg) by mouth once daily 15 tablet 5    FLUoxetine 20 MG capsule Take 3 capsules (60 mg total) by mouth once daily. 270 capsule 3    fluticasone propionate (FLONASE) 50 mcg/actuation nasal spray 2 sprays (100 mcg total) by Each Nostril route once daily. 16 g 0    furosemide (LASIX) 40 MG tablet Take 1 tablet (40 mg total) by mouth 2 (two) times a day. 90 tablet 3    HYDROcodone-acetaminophen (NORCO)  mg per tablet Take 1 tablet by mouth every 6 (six) hours as needed for breakthrough pain.      hydrocortisone (CORTEF) 10 MG Tab Take 1 tablet (10 mg total) by mouth every evening. 30 tablet 5    hydrOXYzine (ATARAX) 50 MG tablet Take 1 tablet (50 mg total) by mouth every 4 (four) hours as needed for Anxiety. 30 tablet 0    intrathecal pain pump compound Hydromorphone (7.5 mg/mL) infusion at 8.59 mg/day (0.3578 mg/hr) out of a total reservoir volume of 40 mL  Pump filled monthly      ketorolac (TORADOL) 10 mg tablet Take 10 mg by mouth daily as needed.      levothyroxine (SYNTHROID) 150 MCG tablet Take 1 tablet  (150 mcg total) by mouth before breakfast. 30 tablet 11    LIDOcaine (LIDODERM) 5 % Place 1 patch onto the skin once daily. Remove & Discard patch within 12 hours or as directed by MD  0    liothyronine (CYTOMEL) 25 MCG Tab Take 1 tablet (25 mcg total) by mouth once daily. 30 tablet 11    nitroGLYCERIN (NITROSTAT) 0.4 MG SL tablet Place 0.4 mg under the tongue every 5 (five) minutes as needed for Chest pain.      ondansetron (ZOFRAN-ODT) 4 MG TbDL Take 4 mg by mouth every 4 to 6 hours as needed.      pantoprazole (PROTONIX) 40 MG tablet Take 1 tablet (40 mg total) by mouth once daily. 90 tablet 3    potassium chloride (MICRO-K) 10 MEQ CpSR Take 2 capsules (20 mEq total) by mouth once daily. 60 capsule 11    promethazine (PHENERGAN) 25 MG tablet Take 25 mg by mouth every 6 (six) hours as needed for Nausea.      QUEtiapine (SEROQUEL) 100 MG Tab TAKE 2 TABLETS (200 MG) BY MOUTH NIGHTLY (Patient taking differently: Take 200 mg by mouth nightly.) 180 tablet 3    senna (SENNA LAX) 8.6 mg tablet Take 2 tablets by mouth 2 (two) times daily.      tiotropium bromide (SPIRIVA RESPIMAT) 2.5 mcg/actuation inhaler Inhale 2 puffs into the lungs once daily. Controller 4 g 1    tiZANidine (ZANAFLEX) 4 MG tablet Take 4 mg by mouth 2 (two) times daily as needed.      traZODone (DESYREL) 300 MG tablet Take 1 tablet (300 mg total) by mouth every evening. 90 tablet 0     No current facility-administered medications on file prior to visit.     Drug Allergies:   Review of patient's allergies indicates:   Allergen Reactions    (d)-limonene flavor      Other reaction(s): difficult intubation  Other reaction(s): Difficulty breathing    Bactrim [sulfamethoxazole-trimethoprim] Anaphylaxis    Benadryl [diphenhydramine hcl] Shortness Of Breath    Fentanyl Itching, Nausea And Vomiting and Swelling             Imitrex [sumatriptan succinate] Shortness Of Breath    Percocet [oxycodone-acetaminophen] Shortness Of Breath    Topamax [topiramate]  "Shortness Of Breath    Vancomycin Anaphylaxis     Rash    Butorphanol tartrate     Darvocet a500 [propoxyphene n-acetaminophen]      Other reaction(s): Difficulty breathing    Evening primrose (oenothera biennis)     Lyrica [pregabalin] Other (See Comments)     tremors    White petrolatum-zinc     Zinc oxide-white petrolatum      Other reaction(s): Difficulty breathing    Latex, natural rubber Itching and Rash    Phenytoin Rash and Other (See Comments)     Trouble breathing     Social Hx:   Social History     Tobacco Use    Smoking status: Never    Smokeless tobacco: Never   Substance Use Topics    Alcohol use: Never    Drug use: Yes     Types: Marijuana     Comment: "medical marijuana gummies"      PHYSICAL EXAM     VS: BP 98/65   Pulse 76   Wt 98 kg (216 lb 0.8 oz)   LMP  (LMP Unknown)   BMI 37.09 kg/m²   GENERAL: Alert, NAD  EYES: EOMI, no conjunctival injection, no discharge, no infraorbital shiners  LUNGS: normal effort, no increased WOB  DERM: no rashes  NEURO: walks with a walker, no facial asymmetry     LABORATORY STUDIES     Component      Latest Ref Rng & Units 5/30/2023 5/29/2023   WBC      3.90 - 12.70 K/uL  5.31   RBC      4.00 - 5.40 M/uL  4.14   Hemoglobin      12.0 - 16.0 g/dL  10.6 (L)   Hematocrit      37.0 - 48.5 %  35.3 (L)   MCV      82 - 98 fL  85   MCH      27.0 - 31.0 pg  25.6 (L)   MCHC      32.0 - 36.0 g/dL  30.0 (L)   RDW      11.5 - 14.5 %  13.5   Platelets      150 - 450 K/uL  245   MPV      9.2 - 12.9 fL  10.4   Immature Granulocytes      0.0 - 0.5 %  0.2   Gran # (ANC)      1.8 - 7.7 K/uL  3.2   Immature Grans (Abs)      0.00 - 0.04 K/uL  0.01   Lymph #      1.0 - 4.8 K/uL  1.3   Mono #      0.3 - 1.0 K/uL  0.5   Eos #      0.0 - 0.5 K/uL  0.3   Baso #      0.00 - 0.20 K/uL  0.02   Platelet Estimate        Appears normal   Differential Method        Automated   Sodium      136 - 145 mmol/L 138    Potassium      3.5 - 5.1 mmol/L 3.2 (L)    Chloride      95 - 110 mmol/L 96    CO2     "  23 - 29 mmol/L 29    Glucose      70 - 110 mg/dL 97    BUN      8 - 23 mg/dL 14    Creatinine      0.5 - 1.4 mg/dL 1.1    Calcium      8.7 - 10.5 mg/dL 10.2       IMAGING & OTHER DIAGNOSTICS     CXR 5/24/23:  FINDINGS:  The cardiac silhouette is stable and nonenlarged.  Bilateral perihilar interstitial opacities and bands of atelectasis in the lower lobes bilaterally greater on the left.  No pleural effusion or pneumothorax.  Impression:  Congestive changes versus nonspecific pneumonitis.     CHART REVIEW     Reviewed urology note, labs, imaging.     ASSESSMENT & PLAN     Tasha Hawley is a 67 y.o. female with     # Bactrim allergy, recurrent UTI's: Patient with history concerning for IgE-mediated allergy (GI, respiratory, and skin symptoms 15-30 minutes after taking bactrim). I had actually seen her for this in 9/2020, and she declined bactrim allergy testing at that time. I again offered her testing (skin testing followed by in-office oral challenge if skin testing negative), but patient declined again, saying she does not want anything to do with bactrim. I explained why it would be helpful to be able to remove bactrim from her allergy list given her recurrent UTI's, but patient was adamant, and does not want testing for this.   -advised that if she ever changes her mind, she can contact me to set up the bactrim allergy testing, or schedule another appointment so that we can further discuss.   -patient should continue avoiding bactrim.  -please note that bactrim allergic patients can safely take non-antibiotic sulfa drugs.    Follow up: as needed    I spent a total of 30 minutes on the day of the visit.  This includes face to face time and non-face to face time preparing to see the patient (eg, review of tests), obtaining and/or reviewing separately obtained history, documenting clinical information in the electronic or other health record.      Kathryn Pittman MD  Allergy/Immunology

## 2023-06-30 NOTE — PROGRESS NOTES
Subjective:      Patient ID: Tasha Hawley is a 67 y.o. female.    Chief Complaint: Ankle Pain (Bilateral feet pain )      67 y.o. female presenting with bilateral feet pain.  Presenting with her .  Extensive past medical history of anxiety, arthritis, bilateral lower extremity edema, congestive heart failure, coronary artery disease, anticoagulant long-term use, lumbar radiculopathy, chronic pain with bilateral feet pain.  Has been dealing with this pain for several years.  Progressively getting worse.  Pain gets worse with standing and ambulation.  Aching and throbbing pain along the dorsal aspect of the midfoot bilaterally.  Patient is following with the pain management currently.    Level of ambulation/Occupation:   Smoking status:   Ariana-D Def:   DM:  Other:     Review of Systems   Musculoskeletal:  Positive for arthritis and back pain.        Bilateral feet pain           Past Medical History:   Diagnosis Date    Anticoagulant long-term use     Anxiety     Arthritis     Bilateral lower extremity edema     severe chronic    Carotid artery occlusion     Cataract     CHF (congestive heart failure)     Coronary artery disease     subtotalled LAD with collateral    Depression     Fever blister     Hard of hearing     Hypokalemia 1/9/2023    Hyponatremia 2/4/2022    Hypothyroid     Iron deficiency anemia     Lumbar radiculopathy     with chronic pain    Ocular migraine     Other emphysema 5/22/2023    Renal disorder     Sleep apnea     cpap       Past Surgical History:   Procedure Laterality Date    ADENOIDECTOMY      BACK SURGERY      x 12    CARDIAC CATHETERIZATION  2016    subtotalled LAD with right to left collaterals    CATARACT EXTRACTION W/  INTRAOCULAR LENS IMPLANT Left     Dr Coleman     CYSTOSCOPIC LITHOLAPAXY N/A 6/27/2019    Procedure: CYSTOLITHOLAPAXY;  Surgeon: Shireen Mayo MD;  Location: [facility] OR [address] [place] [address];  Service: Urology;  Laterality: N/A;    CYSTOSCOPIC LITHOLAPAXY N/A 9/3/2019     Procedure: CYSTOLITHOLAPAXY;  Surgeon: Shireen Mayo MD;  Location: [facility] OR 2ND FLR;  Service: Urology;  Laterality: N/A;    CYSTOSCOPY N/A 7/13/2021    Procedure: CYSTOSCOPY;  Surgeon: Shireen Mayo MD;  Location: [facility] OR 1ST FLR;  Service: Urology;  Laterality: N/A;    CYSTOSCOPY  11/16/2021    Procedure: CYSTOSCOPY;  Surgeon: Shireen Mayo MD;  Location: [facility] OR 1ST FLR;  Service: Urology;;    CYSTOSCOPY  7/19/2022    Procedure: CYSTOSCOPY;  Surgeon: Shireen Mayo MD;  Location: [facility] OR 1ST FLR;  Service: Urology;;    CYSTOSCOPY WITH INJECTION OF PERIURETHRAL BULKING AGENT  7/19/2022    Procedure: CYSTOSCOPY, WITH PERIURETHRAL BULKING AGENT INJECTION-MACROPLASTIQUE;  Surgeon: Shireen Mayo MD;  Location: [facility] OR 1ST FLR;  Service: Urology;;    CYSTOSCOPY WITH INJECTION OF PERIURETHRAL BULKING AGENT N/A 3/28/2023    Procedure: CYSTOSCOPY, WITH PERIURETHRAL BULKING AGENT INJECTION;  Surgeon: Shireen Mayo MD;  Location: [facility] OR 1ST FLR;  Service: Urology;  Laterality: N/A;  [place]    CYSTOSCOPY,WITH BOTULINUM TOXIN INJECTION N/A 12/13/2022    Procedure: CYSTOSCOPY,WITH BOTULINUM TOXIN INJECTION;  Surgeon: Shireen Mayo MD;  Location: [facility] OR 1ST FLR;  Service: Urology;  Laterality: N/A;  300 U    CYSTOSCOPY,WITH BOTULINUM TOXIN INJECTION N/A 3/28/2023    Procedure: CYSTOSCOPY,WITH BOTULINUM TOXIN INJECTION;  Surgeon: Shireen Mayo MD;  Location: [facility] OR 1ST FLR;  Service: Urology;  Laterality: N/A;  45 MIN.    300 UNITS    ESOPHAGOGASTRODUODENOSCOPY N/A 5/23/2018    Procedure: ESOPHAGOGASTRODUODENOSCOPY (EGD);  Surgeon: Roseanne Vance MD;  Location: [facility] (4TH FLR);  Service: Endoscopy;  Laterality: N/A;  r/s 'd per Dr. Vance due to family emergency- ER    ESOPHAGOGASTRODUODENOSCOPY N/A 6/23/2023    Procedure: EGD (ESOPHAGOGASTRODUODENOSCOPY);  Surgeon: Juaquin Barry MD;  Location: [facility] ([address] [place] [address]);  Service: Endoscopy;  Laterality: N/A;  Refferal: ROSEANNE VANCE  E.  Order  tele enc 5/18/23  dx: history of a Nissen fundoplication  Additional Scheduling Information:  On home oxygen 2nd floor for airway protection also with a pain pump elevated BMI close to 40   Prep Gastroparesis   ins    HYSTERECTOMY  1975    endometriosis    INJECTION OF BOTULINUM TOXIN TYPE A  7/13/2021    Procedure: INJECTION, BOTULINUM TOXIN, 200units;  Surgeon: Shireen Mayo MD;  Location: [facility] OR [address] [place] [address];  Service: Urology;;    INJECTION OF BOTULINUM TOXIN TYPE A  11/16/2021    Procedure: INJECTION, BOTULINUM TOXIN, 200units;  Surgeon: Shireen Mayo MD;  Location: [facility] OR [address] [place] [address];  Service: Urology;;    INJECTION OF BOTULINUM TOXIN TYPE A  7/19/2022    Procedure: INJECTION, BOTULINUM TOXIN, 300 units ;  Surgeon: Shireen Mayo MD;  Location: [facility] OR [address] [place] [address];  Service: Urology;;    INSERTION, SUPRAPUBIC CATHETER N/A 12/13/2022    Procedure: INSERTION, SUPRAPUBIC CATHETER;  Surgeon: Shireen Mayo MD;  Location: [facility] OR [address] [place] [address];  Service: Urology;  Laterality: N/A;  exchange    pain pump placement      SQ Dilaudid Pump managed by Dr. Hillman, Pain Management    REMOVAL OF BONE SPUR OF FOOT Bilateral 9/16/2022    Procedure: EXCISION ARTHRITIC BONE, BILATERAL FOOT;  Surgeon: NICOLA Mcgrath;  Location: [facility];  Service: Podiatry;  Laterality: Bilateral;    REPLACEMENT OF CATHETER N/A 10/31/2019    Procedure: REPLACEMENT, CATHETER-SUPRAPUBIC;  Surgeon: Shireen Mayo MD;  Location: [address];  Service: Urology;  Laterality: N/A;    SPINAL CORD STIMULATOR REMOVAL      before [facility]    SPINE SURGERY  5-13-13    CERVICAL FUSION    TONSILLECTOMY         Family History   Problem Relation Age of Onset    Cancer Mother 55        breast    Cancer Father         esophagus,had laryngectomy    Esophageal cancer Father     Parkinsonism Maternal Grandmother     Tremor Maternal Grandmother     No Known Problems Brother     No Known Problems Brother     Heart disease Maternal Uncle      "Colon cancer Maternal Uncle         Less than 60    No Known Problems Sister     No Known Problems Maternal Aunt     Cirrhosis Paternal Aunt         ETOH    Liver disease Paternal Aunt         ETOH    Liver disease Paternal Uncle         ETOH    Cirrhosis Paternal Uncle         ETOH    No Known Problems Maternal Grandfather     No Known Problems Paternal Grandmother     No Known Problems Paternal Grandfather     Melanoma Neg Hx     Amblyopia Neg Hx     Blindness Neg Hx     Cataracts Neg Hx     Diabetes Neg Hx     Glaucoma Neg Hx     Hypertension Neg Hx     Macular degeneration Neg Hx     Retinal detachment Neg Hx     Strabismus Neg Hx     Stroke Neg Hx     Thyroid disease Neg Hx     Celiac disease Neg Hx     Colon polyps Neg Hx     Cystic fibrosis Neg Hx     Crohn's disease Neg Hx     Inflammatory bowel disease Neg Hx     Liver cancer Neg Hx     Rectal cancer Neg Hx     Stomach cancer Neg Hx     Ulcerative colitis Neg Hx     Lymphoma Neg Hx        Social History     Socioeconomic History    Marital status:    Tobacco Use    Smoking status: Never    Smokeless tobacco: Never   Substance and Sexual Activity    Alcohol use: Never    Drug use: Yes     Types: Marijuana     Comment: "medical marijuana gummies"    Sexual activity: Never     Partners: Male     Social Determinants of Health     Financial Resource Strain: Low Risk     Difficulty of Paying Living Expenses: Not hard at all   Food Insecurity: No Food Insecurity    Worried About Running Out of Food in the Last Year: Never true    Ran Out of Food in the Last Year: Never true   Transportation Needs: No Transportation Needs    Lack of Transportation (Medical): No    Lack of Transportation (Non-Medical): No   Physical Activity: Insufficiently Active    Days of Exercise per Week: 4 days    Minutes of Exercise per Session: 10 min   Stress: No Stress Concern Present    Feeling of Stress : Only a little   Social Connections: Moderately Isolated    Frequency of " Communication with Friends and Family: More than three times a week    Frequency of Social Gatherings with Friends and Family: Three times a week    Attends [employer] Services: Never    Active Member of Clubs or Organizations: No    Attends Club or Organization Meetings: Never    Marital Status:    Housing Stability: Low Risk     Unable to Pay for Housing in the Last Year: No    Number of Places Lived in the Last Year: 1    Unstable Housing in the Last Year: No       Current Outpatient Medications   Medication Sig Dispense Refill    albuterol-ipratropium (DUO-NEB) 2.5 mg-0.5 mg/3 mL nebulizer solution Take 3 mLs by nebulization every 6 (six) hours as needed for Wheezing or Shortness of Breath. Rescue 90 mL 0    aspirin (ECOTRIN) 81 MG EC tablet Take 1 tablet (81 mg total) by mouth once daily. 90 tablet 3    atorvastatin (LIPITOR) 80 MG tablet Take 1 tablet (80 mg total) by mouth once daily. 90 tablet 3    butalbital-acetaminophen-caffeine -40 mg (FIORICET, ESGIC) -40 mg per tablet Take 1 tablet by mouth daily as needed.      cyanocobalamin, vitamin B-12, 5,000 mcg Subl Place 1 tablet under the tongue once daily.      docusate sodium (COLACE) 100 MG capsule Take 200 mg by mouth every evening.      ferrous gluconate (FERGON) 324 MG tablet Take 1 tablet (324 mg total) by mouth daily with breakfast. 90 tablet 1    fludrocortisone (FLORINEF) 0.1 mg Tab Take a half-tablet (50 mcg) by mouth once daily 15 tablet 5    FLUoxetine 20 MG capsule Take 3 capsules (60 mg total) by mouth once daily. 270 capsule 3    fluticasone propionate (FLONASE) 50 mcg/actuation nasal spray 2 sprays (100 mcg total) by Each Nostril route once daily. 16 g 0    furosemide (LASIX) 40 MG tablet Take 1 tablet (40 mg total) by mouth 2 (two) times a day. 90 tablet 3    HYDROcodone-acetaminophen (NORCO)  mg per tablet Take 1 tablet by mouth every 6 (six) hours as needed for breakthrough pain.      hydrocortisone (CORTEF) 10 MG  Tab Take 1 tablet (10 mg total) by mouth every evening. 30 tablet 5    hydrOXYzine (ATARAX) 50 MG tablet Take 1 tablet (50 mg total) by mouth every 4 (four) hours as needed for Anxiety. 30 tablet 0    intrathecal pain pump compound Hydromorphone (7.5 mg/mL) infusion at 8.59 mg/day (0.3578 mg/hr) out of a total reservoir volume of 40 mL  Pump filled monthly      ketorolac (TORADOL) 10 mg tablet Take 10 mg by mouth daily as needed.      levothyroxine (SYNTHROID) 150 MCG tablet Take 1 tablet (150 mcg total) by mouth before breakfast. 30 tablet 11    LIDOcaine (LIDODERM) 5 % Place 1 patch onto the skin once daily. Remove & Discard patch within 12 hours or as directed by MD  0    liothyronine (CYTOMEL) 25 MCG Tab Take 1 tablet (25 mcg total) by mouth once daily. 30 tablet 11    nitroGLYCERIN (NITROSTAT) 0.4 MG SL tablet Place 0.4 mg under the tongue every 5 (five) minutes as needed for Chest pain.      ondansetron (ZOFRAN-ODT) 4 MG TbDL Take 4 mg by mouth every 4 to 6 hours as needed.      pantoprazole (PROTONIX) 40 MG tablet Take 1 tablet (40 mg total) by mouth once daily. 90 tablet 3    potassium chloride (MICRO-K) 10 MEQ CpSR Take 2 capsules (20 mEq total) by mouth once daily. 60 capsule 11    promethazine (PHENERGAN) 25 MG tablet Take 25 mg by mouth every 6 (six) hours as needed for Nausea.      QUEtiapine (SEROQUEL) 100 MG Tab TAKE 2 TABLETS (200 MG) BY MOUTH NIGHTLY (Patient taking differently: Take 200 mg by mouth nightly.) 180 tablet 3    senna (SENNA LAX) 8.6 mg tablet Take 2 tablets by mouth 2 (two) times daily.      tiotropium bromide (SPIRIVA RESPIMAT) 2.5 mcg/actuation inhaler Inhale 2 puffs into the lungs once daily. Controller 4 g 1    tiZANidine (ZANAFLEX) 4 MG tablet Take 4 mg by mouth 2 (two) times daily as needed.      traZODone (DESYREL) 300 MG tablet Take 1 tablet (300 mg total) by mouth every evening. 90 tablet 0     No current facility-administered medications for this visit.       Review of  "patient's allergies indicates:   Allergen Reactions    (d)-limonene flavor      Other reaction(s): difficult intubation  Other reaction(s): Difficulty breathing    Bactrim [sulfamethoxazole-trimethoprim] Anaphylaxis    Benadryl [diphenhydramine hcl] Shortness Of Breath    Fentanyl Itching, Nausea And Vomiting and Swelling             Imitrex [sumatriptan succinate] Shortness Of Breath    Percocet [oxycodone-acetaminophen] Shortness Of Breath    Topamax [topiramate] Shortness Of Breath    Vancomycin Anaphylaxis     Rash    Butorphanol tartrate     Darvocet a500 [propoxyphene n-acetaminophen]      Other reaction(s): Difficulty breathing    Evening primrose (oenothera biennis)     Lyrica [pregabalin] Other (See Comments)     tremors    White petrolatum-zinc     Zinc oxide-white petrolatum      Other reaction(s): Difficulty breathing    Latex, natural rubber Itching and Rash    Phenytoin Rash and Other (See Comments)     Trouble breathing       Vitals:    06/30/23 1342   Weight: 98 kg (216 lb)   Height: 5' 4" (1.626 m)   PainSc:   9   PainLoc: Foot       Objective:      Physical Exam    Vascular: Distal DP/PT pulses palpable 0/4. No vericosities noted to LEs. warm to touch LE, + edema noted to LE.    Dermatologic: No open lesions, lacerations or wounds. Interdigital spaces clean, dry and intact. No erythema, rubor, calor noted LE    Musculoskeletal:  No calf tenderness LE, Compartments soft/compressible. ROM of ankles with < 10 deg DF is noted b/l.   B/l feet: ttp midfoot, TMTJ, TNJ, flatfoot, diffuse tenderness medial hindfoot.     Neurological: Light touch, proprioception, and sharp/dull sensation are all intact. Protective threshold with the [place]-Wienstein monofilament is intact. Vibratory sensation intact.         Assessment:       Encounter Diagnoses   Name Primary?    Foot pain, bilateral Yes    Arthritis     Leg edema     Other chronic pain          Plan:       Tasha was seen today for ankle pain.    Diagnoses " and all orders for this visit:    Foot pain, bilateral  -     X-Ray Foot Complete Bilateral; Future  -     X-Ray Ankle Complete Bilateral; Future    Arthritis    Leg edema    Other chronic pain      I counseled the patient on her conditions, their implications and medical management.    67 y.o. female with b/l feet chronic pain, OA.    -bilateral feet, bilateral ankle x-ray reviewed with the patient.  Significant amount of arthritis noted on the midfoot bilaterally.  -discussed different treatment options with the patient.  Significant medical conditions with multiple allergies.  I do not believe that she is a great candidate for surgery.  I recommend pain control.  Patient is following with pain management currently.   -recommend compression dressing bilateral lower extremity.    -I reviewed imaging, clinical history, and diagnosis as above with the patient. I attempted to use layman's terms to educate the patient as well as utilize foot models and/or pictures. I personally went through imaging with the patient.    -The nature of the condition, options for management, as well as potential risks and complications were discussed in detail with patient. Patient was amenable to my recommendations and left my office fully informed and will follow up as instructed or sooner if necessary.    -f/u prn     Note dictated with voice recognition software, please excuse any grammatical errors.

## 2023-07-03 ENCOUNTER — PATIENT MESSAGE (OUTPATIENT)
Dept: GASTROENTEROLOGY | Facility: CLINIC | Age: 68
End: 2023-07-03
Payer: MEDICARE

## 2023-07-03 ENCOUNTER — TELEPHONE (OUTPATIENT)
Dept: ENDOSCOPY | Facility: HOSPITAL | Age: 68
End: 2023-07-03
Payer: MEDICARE

## 2023-07-03 DIAGNOSIS — G89.4 CHRONIC PAIN SYNDROME: Primary | Chronic | ICD-10-CM

## 2023-07-03 RX ORDER — MUPIROCIN 20 MG/G
OINTMENT TOPICAL 2 TIMES DAILY
Qty: 1 EACH | Refills: 0 | Status: SHIPPED | OUTPATIENT
Start: 2023-07-03 | End: 2023-07-11

## 2023-07-03 NOTE — TELEPHONE ENCOUNTER
----- Message from Racheal Jorge sent at 7/3/2023  4:14 PM CDT -----  Regarding: pt advice  Contact: [phone]  Pt is calling for the provider to give her a call. No more information was given. Please call

## 2023-07-04 ENCOUNTER — TELEPHONE (OUTPATIENT)
Dept: ALLERGY | Facility: CLINIC | Age: 68
End: 2023-07-04
Payer: MEDICARE

## 2023-07-04 NOTE — TELEPHONE ENCOUNTER
I attempted to call patient's step daughter in response to their message to confirm that the patient would be able to hold certain medications for bactrim allergy skin testing (namely her quetiapine and hydroxyzine, possibly trazodone although this is not as likely to affect skin testing). I was not able to reach her. Will try again later. If I am not able to reach her by phone, will respond to her message through the portal.    Kathryn Pittman MD  Allergy/Immunology

## 2023-07-05 ENCOUNTER — TELEPHONE (OUTPATIENT)
Dept: ENDOSCOPY | Facility: HOSPITAL | Age: 68
End: 2023-07-05
Payer: MEDICARE

## 2023-07-05 ENCOUNTER — PATIENT MESSAGE (OUTPATIENT)
Dept: GASTROENTEROLOGY | Facility: CLINIC | Age: 68
End: 2023-07-05
Payer: MEDICARE

## 2023-07-05 ENCOUNTER — TELEPHONE (OUTPATIENT)
Dept: UROLOGY | Facility: CLINIC | Age: 68
End: 2023-07-05
Payer: MEDICARE

## 2023-07-05 NOTE — TELEPHONE ENCOUNTER
All of Mrs. Hawley's phone calls were return. Voicemail's were left every time. I have been speaking to the daughter, Jaycee who does not understand why her mother is calling the office daily like this. She mentioned that her mother is doing well and she would like another order sent to the  agency for another urine culture. Jaycee mentioned that her mother has an appointment with her cardiologist in August for clearance. Once she receives clearance from her cardiologist they will make an appointment to come in discuss botox.     A message has been sent to Dr. Mayo.    DONALD Gonzalez      ----- Message from Valentino Esquivel sent at 7/5/2023  3:32 PM CDT -----  Contact: pt [phone]  Pt requesting urgent  call back RE: pt states she has been calling for 3 days with no return call. Would like to discuss her Botox       Confirmed contact below:  Contact Name:Tasha Hawley  Phone Number: [phone]

## 2023-07-05 NOTE — TELEPHONE ENCOUNTER
----- Message from Mariangel Hope sent at 7/5/2023 10:08 AM CDT -----  Regarding: pt advice  Contact: [phone]  JUDIE YO calling regarding Patient Advice (message) for #requesting a call back regarding her iv infusions is not working due to they have to keep sticking her and if it don't work she will not be doing it today. Please call

## 2023-07-06 ENCOUNTER — TELEPHONE (OUTPATIENT)
Dept: PREADMISSION TESTING | Facility: HOSPITAL | Age: 68
End: 2023-07-06

## 2023-07-06 ENCOUNTER — HOSPITAL ENCOUNTER (OUTPATIENT)
Dept: PREADMISSION TESTING | Facility: HOSPITAL | Age: 68
Discharge: HOME | End: 2023-07-06
Attending: ANESTHESIOLOGY
Payer: MEDICARE

## 2023-07-06 ENCOUNTER — PATIENT MESSAGE (OUTPATIENT)
Dept: INTERNAL MEDICINE | Facility: CLINIC | Age: 68
End: 2023-07-06
Payer: MEDICARE

## 2023-07-06 VITALS
RESPIRATION RATE: 18 BRPM | HEART RATE: 76 BPM | OXYGEN SATURATION: 95 % | BODY MASS INDEX: 36.73 KG/M2 | SYSTOLIC BLOOD PRESSURE: 108 MMHG | DIASTOLIC BLOOD PRESSURE: 53 MMHG | WEIGHT: 214 LBS | TEMPERATURE: 98 F

## 2023-07-06 DIAGNOSIS — M79.609 PAIN IN EXTREMITY, UNSPECIFIED EXTREMITY: ICD-10-CM

## 2023-07-06 DIAGNOSIS — Z01.818 PREOPERATIVE TESTING: Primary | ICD-10-CM

## 2023-07-06 NOTE — PRE-PROCEDURE INSTRUCTIONS
Spoke with patient . She said she would prefer me to talk with her daughter Jaycee about her medical .  I instructed her to get an involvement in care form with next [facility] clinic or hospital visit. Verbalizes understanding.

## 2023-07-06 NOTE — PRE-PROCEDURE INSTRUCTIONS
Spoke withJaycee, patient's daughter with the permission of Miss Cordova.  I instructed her to get an involvement in care form with next [facility] clinic or hospital visit. She said her mother has not had any problem with anesthesia in the past. Will need medical clearance from her PCP,Dr. Mesfin Hodges. She said she waS  recently seen by him and will  call to see if can clear from that appt. If not, will get another appt. I will ask Dr. Hodges for ASA 81 mg instructions. Will need poc appt, labs, and ua.  Our  will call to set up these appts.    Preop instructions given: Hold other aspirin containing products, nsaids(aleve, advil, motrin, ibuprofen, naprosyn, naproxen, voltaren, diclofenac, Ketorolac, toradol), vitamins( Vitamin B12) and supplements one week prior to surgery.   May take Tylenol.  Await instructions from Dr. Hodges for Aspirin 81 mg.    ( Also sent to [facility])   Verbalizes understanding.

## 2023-07-06 NOTE — ANESTHESIA PAT ROS NOTE
07/06/2023  Tasha Hawley is a 67 y.o., female.      Pre-op Assessment          Review of Systems  Anesthesia Hx:  No problems with previous Anesthesia   History of prior surgery of interest to airway management or planning: cervical fusion. Previous anesthesia: MAC       6/23/23-EGD with MAC.  Procedure performed at an [facility] Facility.  Denies Family Hx of Anesthesia complications.    Denies Personal Hx of Anesthesia complications.                    Social:  Non-Smoker, No Alcohol Use       Hematology/Oncology:    Oncology Normal    -- Anemia: Iron Deficiency Anemia                 -- Transfusion: -- Transfusion Hx (no complications):   Hematology Comments: PATIENT RECEIVING IRON INFUSIONS (LAST INFUSION 7/5/23)                    EENT/Dental:     Ears General/Symptom(s) Hearing Impairment: deaf- right    [place]-LEFT, DOES NOT WEAR HEARING AIDS        Cardiovascular:        CAD      Angina, with exertion CHF    hyperlipidemia    EF 65% Functional Capacity 2 METS, USES WHEELCHAIR/ROLLATOR            Carotoid Artery Disease               Pulmonary:   COPD, moderate   Shortness of breath   RECENT PNEUMONIA-FEW MONTHS AGO Pulmonary Symptoms:  are dependence on supplemental O2.  Dependence on O2 is PRN with activity    Chronic Obstructive Pulmonary Disease (COPD):   Emphysema   Hospitalization required in the past year. Inhaler use is maintenance inhaler daily and maintenance inhaler PRN.    Obstructive Sleep Apnea (CECI), CPAP prescribed.           Renal/:  Chronic Renal Disease, CKD       Devices/Procedures/Surgery, suprapubic catheter. Kidney Function/Disease, Chronic Kidney Disease (CKD) , CKD Stage III (GFR 30-59)            Hepatic/GI:     GERD   NEUROMUSCULAR DYSFUNCTION OF THE BLADDER          Musculoskeletal:     BACK SURGERIES X 12, SEVERAL LUMBAR SURGERIES AND H/O CERVICAL FUSION, CHRONIC  "NECK/BACK PAIN   Joint Disease:  Arthritis, Osteoarthritis      Cervical Spine Disorder, S/P Cervical Fusion     Neurological:           LUMBAR RADICULOPATHY Dx of Headaches, Migraine Headache  Pain Syndrome   Chronic Pain Syndrome Osteoarthritis                           Endocrine:   Hypothyroidism        Obesity / BMI > 30  Psych:   anxiety depression              Physical Exam  General: Well nourished    Airway:  Mouth Opening: Normal  TM Distance: Normal  Tongue: Normal  Neck ROM: Extension Decreased, Flexion Decreased, Right Lateral Motion Decreased, Left Lateral Motion Decreased    Dental:  Edentulous    Chest/Lungs:  Clear to auscultation  Decreased Breath Sounds: left and right  PATIENT REPORTS "CHEST TIGHTNESS"  Skin:  Erythema  REDNESS AND SEVERE, CHRONIC LYMPHEDEMA TO BILATERAL L.E.      Anesthesia Assessment: Preoperative EQUATION    Planned Procedure: Procedure(s) (LRB):  REPLACEMENT, BACLOFEN PUMP (N/A)  Requested Anesthesia Type:General  Surgeon: Smitha Condon MD  Service: Neurosurgery  Known or anticipated Date of Surgery:7/12/2023    Surgeon notes: reviewed    Electronic QUestionnaire Assessment completed via nurse interview with patient.        Triage considerations:       Previous anesthesia records:MAC and No problems  6/23/2023   EGD (ESOPHAGOGASTRODUODENOSCOPY)    Airway:  Mallampati: II   Mouth Opening: Normal  TM Distance: Normal  Tongue: Normal  Neck ROM: Normal ROM    Last PCP note: within 1 month , within [facility]   Subspecialty notes: Allergy, Dermatology, Endocrinology, Gastroenterology, Neurosurgery, PODIATRY    Other important co-morbidities: PER Epic: CHF, GERD, HLD, Hypothyroid, Obesity, CECI, and CHRONIC PAIN SYNDROME, H/O STROKE, EMPHYSEMA, CAD, CKD, ANEMIA       Tests already available:  Available tests,  within 1 month , within 3 months , within [facility] .   7/3/2023 TSH, FT4, 5/30/2023  MG, BMP, 5/29/2023 CBC, 5/27/2023 EKG          Instructions given. (See in Nurse's " note)    Optimization:  Anesthesia Preop Clinic Assessment  Indicated    Medical Opinion Indicated           Plan:    Testing:  PT/INR, PTT, T&S, and UA   Pre-anesthesia  visit       Visit focus: concerns in complex and/or prolonged anesthesia, COMORBIDITIES     Consultation:Patient's PCP for re-evaluation OR STATEMENT OF MEDICAL CLEARANCE     Patient  has previously scheduled Medical Appointment:NONE    Navigation: Tests Scheduled. TBD             Consults scheduled.TBD             Results will be tracked by Preop Clinic.  Dasha Russell RN BSN    7/24/23-Patient is optimized  Gloria Ortiz MD  Internal Medicine  [facility]   Cell Phone- [phone]

## 2023-07-07 ENCOUNTER — PATIENT MESSAGE (OUTPATIENT)
Dept: ENDOCRINOLOGY | Facility: CLINIC | Age: 68
End: 2023-07-07
Payer: MEDICARE

## 2023-07-07 ENCOUNTER — TELEPHONE (OUTPATIENT)
Dept: INTERNAL MEDICINE | Facility: CLINIC | Age: 68
End: 2023-07-07
Payer: MEDICARE

## 2023-07-07 NOTE — TELEPHONE ENCOUNTER
Called patient's step daughter again and was able to reach her. She says she does think Ms. Hawley would be able to hold her quetiapine for 2 weeks and hydroxyzine for 1 week in order to do skin testing. She says patient has surgery coming up, so wants to give it at least a month. Planning to set up bactrim skin testing +/- challenge on 9/8/23 at 10am.    Kathryn Pittman MD  Allergy/Immunology

## 2023-07-07 NOTE — TELEPHONE ENCOUNTER
----- Message from Rohith Garcias MD sent at 7/7/2023  1:03 PM CDT -----  Regarding: Pre-Op visit  This is a patient of Dr. Noyola. I cannot give clearance for operation without seeing patient myself. Needs to come in for a Pre-Op clearance either Monday or Tuesday. Please call and arrange.     Also, recommend holding Aspirin starting tomorrow until the surgery on 7/12.     Thanks,   TP    ----- Message -----  From: Josh Lawrence MA  Sent: 7/6/2023   9:37 AM CDT  To: Rohith Garcias MD      ----- Message -----  From: Dasha Russell RN  Sent: 7/6/2023   9:31 AM CDT  To: Dasha Russell RN, Mesfin Hodges II, MD, #    Patient is scheduled for baclofen pump replacement on 7/12 with Dr. Condon. ( Approximately 160 minutes of general anesthesia) She will need medical clearance. You recently saw her on 6/27. May clear per chart. If not able please schedule a preop clearance appt.  I also need ASA 81 mg instructions. Please provide stop time instructions, by entering a note in EPIC.     Thanks!

## 2023-07-08 ENCOUNTER — HOSPITAL ENCOUNTER (OUTPATIENT)
Facility: HOSPITAL | Age: 68
Discharge: HOME HEALTH | End: 2023-07-10
Attending: EMERGENCY MEDICINE | Admitting: HOSPITALIST
Payer: MEDICARE

## 2023-07-08 DIAGNOSIS — I50.9 CONGESTIVE HEART FAILURE, UNSPECIFIED HF CHRONICITY, UNSPECIFIED HEART FAILURE TYPE: Primary | ICD-10-CM

## 2023-07-08 DIAGNOSIS — R06.02 SHORTNESS OF BREATH: ICD-10-CM

## 2023-07-08 LAB
ALBUMIN SERPL BCP-MCNC: 2.9 G/DL (ref 3.5–5.2)
ALP SERPL-CCNC: 111 U/L (ref 55–135)
ALT SERPL W/O P-5'-P-CCNC: 7 U/L (ref 10–44)
ANION GAP SERPL CALC-SCNC: 12 MMOL/L (ref 8–16)
AST SERPL-CCNC: 14 U/L (ref 10–40)
BASOPHILS # BLD AUTO: 0.01 K/UL (ref 0–0.2)
BASOPHILS NFR BLD: 0.2 % (ref 0–1.9)
BILIRUB SERPL-MCNC: 0.4 MG/DL (ref 0.1–1)
BNP SERPL-MCNC: 162 PG/ML (ref 0–99)
BUN SERPL-MCNC: 5 MG/DL (ref 8–23)
CALCIUM SERPL-MCNC: 8.7 MG/DL (ref 8.7–10.5)
CHLORIDE SERPL-SCNC: 99 MMOL/L (ref 95–110)
CO2 SERPL-SCNC: 28 MMOL/L (ref 23–29)
CREAT SERPL-MCNC: 0.8 MG/DL (ref 0.5–1.4)
DIFFERENTIAL METHOD: ABNORMAL
EOSINOPHIL # BLD AUTO: 0.1 K/UL (ref 0–0.5)
EOSINOPHIL NFR BLD: 2.9 % (ref 0–8)
ERYTHROCYTE [DISTWIDTH] IN BLOOD BY AUTOMATED COUNT: 14.6 % (ref 11.5–14.5)
EST. GFR  (NO RACE VARIABLE): >60 ML/MIN/1.73 M^2
GLUCOSE SERPL-MCNC: 96 MG/DL (ref 70–110)
HCT VFR BLD AUTO: 32.2 % (ref 37–48.5)
HGB BLD-MCNC: 10.2 G/DL (ref 12–16)
IMM GRANULOCYTES # BLD AUTO: 0 K/UL (ref 0–0.04)
IMM GRANULOCYTES NFR BLD AUTO: 0 % (ref 0–0.5)
LYMPHOCYTES # BLD AUTO: 1.1 K/UL (ref 1–4.8)
LYMPHOCYTES NFR BLD: 23.2 % (ref 18–48)
MCH RBC QN AUTO: 26.1 PG (ref 27–31)
MCHC RBC AUTO-ENTMCNC: 31.7 G/DL (ref 32–36)
MCV RBC AUTO: 82 FL (ref 82–98)
MONOCYTES # BLD AUTO: 0.7 K/UL (ref 0.3–1)
MONOCYTES NFR BLD: 15.8 % (ref 4–15)
NEUTROPHILS # BLD AUTO: 2.6 K/UL (ref 1.8–7.7)
NEUTROPHILS NFR BLD: 57.9 % (ref 38–73)
NRBC BLD-RTO: 0 /100 WBC
PLATELET # BLD AUTO: 196 K/UL (ref 150–450)
PMV BLD AUTO: 9.7 FL (ref 9.2–12.9)
POTASSIUM SERPL-SCNC: 3.1 MMOL/L (ref 3.5–5.1)
PROT SERPL-MCNC: 6.5 G/DL (ref 6–8.4)
RBC # BLD AUTO: 3.91 M/UL (ref 4–5.4)
SODIUM SERPL-SCNC: 139 MMOL/L (ref 136–145)
TROPONIN I SERPL DL<=0.01 NG/ML-MCNC: <0.006 NG/ML (ref 0–0.03)
WBC # BLD AUTO: 4.56 K/UL (ref 3.9–12.7)

## 2023-07-08 PROCEDURE — 80053 COMPREHEN METABOLIC PANEL: CPT | Performed by: EMERGENCY MEDICINE

## 2023-07-08 PROCEDURE — 93010 ELECTROCARDIOGRAM REPORT: CPT | Mod: ,,, | Performed by: INTERNAL MEDICINE

## 2023-07-08 PROCEDURE — 25000003 PHARM REV CODE 250: Performed by: NURSE PRACTITIONER

## 2023-07-08 PROCEDURE — G0378 HOSPITAL OBSERVATION PER HR: HCPCS

## 2023-07-08 PROCEDURE — 83880 ASSAY OF NATRIURETIC PEPTIDE: CPT | Performed by: EMERGENCY MEDICINE

## 2023-07-08 PROCEDURE — 96374 THER/PROPH/DIAG INJ IV PUSH: CPT

## 2023-07-08 PROCEDURE — 93010 EKG 12-LEAD: ICD-10-PCS | Mod: ,,, | Performed by: INTERNAL MEDICINE

## 2023-07-08 PROCEDURE — 99285 EMERGENCY DEPT VISIT HI MDM: CPT | Mod: 25

## 2023-07-08 PROCEDURE — 93005 ELECTROCARDIOGRAM TRACING: CPT

## 2023-07-08 PROCEDURE — 84484 ASSAY OF TROPONIN QUANT: CPT | Performed by: EMERGENCY MEDICINE

## 2023-07-08 PROCEDURE — 85025 COMPLETE CBC W/AUTO DIFF WBC: CPT | Performed by: EMERGENCY MEDICINE

## 2023-07-08 PROCEDURE — 63600175 PHARM REV CODE 636 W HCPCS: Performed by: EMERGENCY MEDICINE

## 2023-07-08 RX ORDER — FERROUS GLUCONATE 324(38)MG
324 TABLET ORAL
Status: DISCONTINUED | OUTPATIENT
Start: 2023-07-09 | End: 2023-07-10 | Stop reason: HOSPADM

## 2023-07-08 RX ORDER — DOCUSATE SODIUM 100 MG/1
200 CAPSULE, LIQUID FILLED ORAL NIGHTLY
Status: DISCONTINUED | OUTPATIENT
Start: 2023-07-08 | End: 2023-07-10 | Stop reason: HOSPADM

## 2023-07-08 RX ORDER — FUROSEMIDE 10 MG/ML
80 INJECTION INTRAMUSCULAR; INTRAVENOUS
Status: DISCONTINUED | OUTPATIENT
Start: 2023-07-09 | End: 2023-07-10 | Stop reason: HOSPADM

## 2023-07-08 RX ORDER — ERGOCALCIFEROL 1.25 MG/1
CAPSULE ORAL
COMMUNITY
End: 2023-07-08

## 2023-07-08 RX ORDER — CEFDINIR 300 MG/1
CAPSULE ORAL
COMMUNITY
End: 2023-07-08

## 2023-07-08 RX ORDER — DIPHENOXYLATE HYDROCHLORIDE AND ATROPINE SULFATE 2.5; .025 MG/1; MG/1
TABLET ORAL
COMMUNITY
End: 2023-07-08

## 2023-07-08 RX ORDER — CIPROFLOXACIN 500 MG/1
TABLET ORAL
COMMUNITY
End: 2023-07-08

## 2023-07-08 RX ORDER — FLUDROCORTISONE ACETATE 0.1 MG/1
100 TABLET ORAL DAILY
Status: DISCONTINUED | OUTPATIENT
Start: 2023-07-09 | End: 2023-07-10 | Stop reason: HOSPADM

## 2023-07-08 RX ORDER — VALACYCLOVIR HYDROCHLORIDE 1 G/1
TABLET, FILM COATED ORAL
COMMUNITY
End: 2023-07-08

## 2023-07-08 RX ORDER — QUETIAPINE FUMARATE 100 MG/1
200 TABLET, FILM COATED ORAL NIGHTLY
Status: DISCONTINUED | OUTPATIENT
Start: 2023-07-08 | End: 2023-07-10 | Stop reason: HOSPADM

## 2023-07-08 RX ORDER — OLANZAPINE 5 MG/1
TABLET ORAL
COMMUNITY
End: 2023-07-08

## 2023-07-08 RX ORDER — HYDROCORTISONE 5 MG/1
10 TABLET ORAL NIGHTLY
Status: DISCONTINUED | OUTPATIENT
Start: 2023-07-08 | End: 2023-07-10 | Stop reason: HOSPADM

## 2023-07-08 RX ORDER — CEFPODOXIME PROXETIL 200 MG/1
TABLET, FILM COATED ORAL
COMMUNITY
End: 2023-07-08

## 2023-07-08 RX ORDER — TORSEMIDE 10 MG/1
TABLET ORAL
COMMUNITY
End: 2023-07-08

## 2023-07-08 RX ORDER — QUETIAPINE FUMARATE 25 MG/1
TABLET, FILM COATED ORAL
COMMUNITY
End: 2023-07-08

## 2023-07-08 RX ORDER — LANOLIN ALCOHOL/MO/W.PET/CERES
2000 CREAM (GRAM) TOPICAL DAILY
Status: DISCONTINUED | OUTPATIENT
Start: 2023-07-09 | End: 2023-07-10 | Stop reason: HOSPADM

## 2023-07-08 RX ORDER — HYDROXYZINE HYDROCHLORIDE 25 MG/1
50 TABLET, FILM COATED ORAL EVERY 4 HOURS PRN
Status: DISCONTINUED | OUTPATIENT
Start: 2023-07-08 | End: 2023-07-10 | Stop reason: HOSPADM

## 2023-07-08 RX ORDER — FLUCONAZOLE 150 MG/1
TABLET ORAL
COMMUNITY
End: 2023-07-08

## 2023-07-08 RX ORDER — MUPIROCIN 20 MG/G
OINTMENT TOPICAL 2 TIMES DAILY
Status: DISCONTINUED | OUTPATIENT
Start: 2023-07-08 | End: 2023-07-10 | Stop reason: HOSPADM

## 2023-07-08 RX ORDER — TRAZODONE HYDROCHLORIDE 100 MG/1
TABLET ORAL
COMMUNITY
End: 2023-07-08

## 2023-07-08 RX ORDER — PANTOPRAZOLE SODIUM 40 MG/1
40 TABLET, DELAYED RELEASE ORAL DAILY
Status: DISCONTINUED | OUTPATIENT
Start: 2023-07-09 | End: 2023-07-10 | Stop reason: HOSPADM

## 2023-07-08 RX ORDER — LIOTHYRONINE SODIUM 25 UG/1
25 TABLET ORAL DAILY
Status: DISCONTINUED | OUTPATIENT
Start: 2023-07-09 | End: 2023-07-10 | Stop reason: HOSPADM

## 2023-07-08 RX ORDER — DOXYCYCLINE HYCLATE 100 MG
TABLET ORAL
COMMUNITY
End: 2023-07-08

## 2023-07-08 RX ORDER — DARIFENACIN 7.5 MG/1
TABLET, EXTENDED RELEASE ORAL
COMMUNITY
End: 2023-07-08

## 2023-07-08 RX ORDER — DAPTOMYCIN 50 MG/ML
INJECTION, POWDER, LYOPHILIZED, FOR SOLUTION INTRAVENOUS
COMMUNITY
End: 2023-07-08

## 2023-07-08 RX ORDER — CIPROFLOXACIN 750 MG/1
TABLET, FILM COATED ORAL
COMMUNITY
End: 2023-07-08

## 2023-07-08 RX ORDER — FUROSEMIDE 20 MG/1
TABLET ORAL
COMMUNITY
End: 2023-07-08

## 2023-07-08 RX ORDER — ASPIRIN 81 MG/1
81 TABLET ORAL DAILY
Status: DISCONTINUED | OUTPATIENT
Start: 2023-07-09 | End: 2023-07-10 | Stop reason: HOSPADM

## 2023-07-08 RX ORDER — LEVOTHYROXINE SODIUM 125 UG/1
TABLET ORAL
COMMUNITY
End: 2023-07-08

## 2023-07-08 RX ORDER — SODIUM CHLORIDE 0.9 % (FLUSH) 0.9 %
10 SYRINGE (ML) INJECTION
Status: DISCONTINUED | OUTPATIENT
Start: 2023-07-08 | End: 2023-07-10 | Stop reason: HOSPADM

## 2023-07-08 RX ORDER — LEVOFLOXACIN 750 MG/1
TABLET ORAL
COMMUNITY
End: 2023-07-08

## 2023-07-08 RX ORDER — METHOCARBAMOL 750 MG/1
TABLET, FILM COATED ORAL
COMMUNITY
End: 2023-07-08

## 2023-07-08 RX ORDER — IPRATROPIUM BROMIDE AND ALBUTEROL SULFATE 2.5; .5 MG/3ML; MG/3ML
3 SOLUTION RESPIRATORY (INHALATION) EVERY 6 HOURS PRN
Status: DISCONTINUED | OUTPATIENT
Start: 2023-07-08 | End: 2023-07-10 | Stop reason: HOSPADM

## 2023-07-08 RX ORDER — TIZANIDINE 4 MG/1
4 TABLET ORAL 2 TIMES DAILY PRN
Status: DISCONTINUED | OUTPATIENT
Start: 2023-07-08 | End: 2023-07-10 | Stop reason: HOSPADM

## 2023-07-08 RX ORDER — NITROFURANTOIN 25; 75 MG/1; MG/1
CAPSULE ORAL
COMMUNITY
End: 2023-07-08

## 2023-07-08 RX ORDER — ATORVASTATIN CALCIUM 40 MG/1
80 TABLET, FILM COATED ORAL DAILY
Status: DISCONTINUED | OUTPATIENT
Start: 2023-07-09 | End: 2023-07-10 | Stop reason: HOSPADM

## 2023-07-08 RX ORDER — FLUOXETINE HYDROCHLORIDE 20 MG/1
60 CAPSULE ORAL DAILY
Status: DISCONTINUED | OUTPATIENT
Start: 2023-07-09 | End: 2023-07-10 | Stop reason: HOSPADM

## 2023-07-08 RX ORDER — FLUTICASONE PROPIONATE 50 MCG
2 SPRAY, SUSPENSION (ML) NASAL DAILY
Status: DISCONTINUED | OUTPATIENT
Start: 2023-07-09 | End: 2023-07-10 | Stop reason: HOSPADM

## 2023-07-08 RX ORDER — LEVOTHYROXINE SODIUM 137 UG/1
TABLET ORAL
COMMUNITY
End: 2023-07-08

## 2023-07-08 RX ORDER — FUROSEMIDE 10 MG/ML
80 INJECTION INTRAMUSCULAR; INTRAVENOUS
Status: COMPLETED | OUTPATIENT
Start: 2023-07-08 | End: 2023-07-08

## 2023-07-08 RX ORDER — OXYBUTYNIN CHLORIDE 15 MG/1
TABLET, EXTENDED RELEASE ORAL
COMMUNITY
End: 2023-07-08

## 2023-07-08 RX ORDER — TIZANIDINE 4 MG/1
1 TABLET ORAL 2 TIMES DAILY PRN
COMMUNITY
End: 2023-07-08

## 2023-07-08 RX ORDER — CEPHALEXIN 250 MG/5ML
POWDER, FOR SUSPENSION ORAL
COMMUNITY
End: 2023-07-08

## 2023-07-08 RX ORDER — AMOXICILLIN AND CLAVULANATE POTASSIUM 500; 125 MG/1; MG/1
TABLET, FILM COATED ORAL
COMMUNITY
End: 2023-07-08

## 2023-07-08 RX ORDER — LEVOFLOXACIN 500 MG/1
500 TABLET, FILM COATED ORAL DAILY
COMMUNITY
End: 2023-07-08

## 2023-07-08 RX ORDER — GABAPENTIN 100 MG/1
CAPSULE ORAL
COMMUNITY
End: 2023-07-08

## 2023-07-08 RX ORDER — TORSEMIDE 100 MG/1
TABLET ORAL
COMMUNITY
End: 2023-07-08

## 2023-07-08 RX ORDER — KETOROLAC TROMETHAMINE 30 MG/ML
30 INJECTION, SOLUTION INTRAMUSCULAR; INTRAVENOUS ONCE
Status: ON HOLD | COMMUNITY
Start: 2023-04-17 | End: 2023-09-10

## 2023-07-08 RX ORDER — CLINDAMYCIN HYDROCHLORIDE 300 MG/1
CAPSULE ORAL
COMMUNITY
End: 2023-07-08

## 2023-07-08 RX ORDER — FLUCONAZOLE 100 MG/1
TABLET ORAL
COMMUNITY
End: 2023-07-08

## 2023-07-08 RX ORDER — POTASSIUM CHLORIDE 20 MEQ/1
20 TABLET, EXTENDED RELEASE ORAL DAILY
Status: DISCONTINUED | OUTPATIENT
Start: 2023-07-09 | End: 2023-07-10 | Stop reason: HOSPADM

## 2023-07-08 RX ORDER — POTASSIUM CHLORIDE 750 MG/1
TABLET, EXTENDED RELEASE ORAL
COMMUNITY
End: 2023-07-08

## 2023-07-08 RX ORDER — FERROUS SULFATE 325(65) MG
TABLET ORAL
COMMUNITY
End: 2023-07-08 | Stop reason: ALTCHOICE

## 2023-07-08 RX ORDER — SODIUM CHLORIDE 9 MG/ML
INJECTION, SOLUTION INTRAVENOUS
COMMUNITY
End: 2023-07-08

## 2023-07-08 RX ORDER — DICLOXACILLIN SODIUM 500 MG/1
CAPSULE ORAL
COMMUNITY
End: 2023-07-08

## 2023-07-08 RX ORDER — VIBEGRON 75 MG/1
TABLET, FILM COATED ORAL
COMMUNITY
End: 2023-07-08

## 2023-07-08 RX ORDER — FLUCONAZOLE 200 MG/1
TABLET ORAL
COMMUNITY
End: 2023-07-08

## 2023-07-08 RX ORDER — KETOCONAZOLE 20 MG/G
CREAM TOPICAL
COMMUNITY
End: 2023-07-08

## 2023-07-08 RX ORDER — FUROSEMIDE 10 MG/ML
INJECTION INTRAMUSCULAR; INTRAVENOUS
COMMUNITY
Start: 2023-04-17 | End: 2023-07-08

## 2023-07-08 RX ORDER — HYDROCODONE BITARTRATE AND ACETAMINOPHEN 10; 325 MG/1; MG/1
1 TABLET ORAL EVERY 6 HOURS PRN
Status: DISCONTINUED | OUTPATIENT
Start: 2023-07-08 | End: 2023-07-10 | Stop reason: HOSPADM

## 2023-07-08 RX ORDER — DOXYCYCLINE 100 MG/1
CAPSULE ORAL
COMMUNITY
End: 2023-07-08

## 2023-07-08 RX ORDER — POTASSIUM CHLORIDE 750 MG/1
30 TABLET, EXTENDED RELEASE ORAL
Status: COMPLETED | OUTPATIENT
Start: 2023-07-08 | End: 2023-07-09

## 2023-07-08 RX ORDER — MIRABEGRON 50 MG/1
TABLET, FILM COATED, EXTENDED RELEASE ORAL
COMMUNITY
End: 2023-07-08

## 2023-07-08 RX ORDER — CEPHALEXIN 500 MG/1
CAPSULE ORAL
COMMUNITY
End: 2023-07-08

## 2023-07-08 RX ORDER — AMOXICILLIN AND CLAVULANATE POTASSIUM 875; 125 MG/1; MG/1
TABLET, FILM COATED ORAL
COMMUNITY
End: 2023-07-08

## 2023-07-08 RX ORDER — TRAZODONE HYDROCHLORIDE 100 MG/1
300 TABLET ORAL NIGHTLY
Status: DISCONTINUED | OUTPATIENT
Start: 2023-07-08 | End: 2023-07-10 | Stop reason: HOSPADM

## 2023-07-08 RX ORDER — LEVOTHYROXINE SODIUM 75 UG/1
150 TABLET ORAL
Status: DISCONTINUED | OUTPATIENT
Start: 2023-07-09 | End: 2023-07-10 | Stop reason: HOSPADM

## 2023-07-08 RX ADMIN — POTASSIUM CHLORIDE 30 MEQ: 750 TABLET, EXTENDED RELEASE ORAL at 09:07

## 2023-07-08 RX ADMIN — HYDROCODONE BITARTRATE AND ACETAMINOPHEN 1 TABLET: 10; 325 TABLET ORAL at 09:07

## 2023-07-08 RX ADMIN — DOCUSATE SODIUM 200 MG: 100 CAPSULE, LIQUID FILLED ORAL at 09:07

## 2023-07-08 RX ADMIN — FUROSEMIDE 80 MG: 10 INJECTION, SOLUTION INTRAMUSCULAR; INTRAVENOUS at 05:07

## 2023-07-08 RX ADMIN — HYDROCORTISONE 10 MG: 5 TABLET ORAL at 10:07

## 2023-07-08 RX ADMIN — QUETIAPINE FUMARATE 200 MG: 100 TABLET ORAL at 09:07

## 2023-07-08 RX ADMIN — TRAZODONE HYDROCHLORIDE 300 MG: 100 TABLET ORAL at 09:07

## 2023-07-08 RX ADMIN — POTASSIUM CHLORIDE 30 MEQ: 750 TABLET, EXTENDED RELEASE ORAL at 07:07

## 2023-07-08 RX ADMIN — MUPIROCIN: 20 OINTMENT TOPICAL at 09:07

## 2023-07-08 NOTE — ED NOTES
Patient stated that she went to urgent care to have an xray performed to see if she has pneumonia. Patient wears home O2 and stated that her saturations was in the 70's at home. Patient is currently on 2L/NC and saturations at 96-97%.  at bedside. Xray performed

## 2023-07-08 NOTE — ED PROVIDER NOTES
Emergency Department Encounter  Provider Note  Encounter Date: 7/8/2023    Patient Name: Tasha Hawley  MRN: [id number]    History of Present Illness   HPI  History of Present Illness:    Chief Complaint:   Chief Complaint   Patient presents with    Shortness of Breath     Pt states that she has a lot of chest congestion and difficulty breathing. Pt is on o2 at home 4lpm. Pt does have a issue with fluid accumulation in her legs and has difficulty walking. Pt is afebrile at this time.        67-year-old female with a history of COPD on 4-5 L, chronic bilateral lower extremity edema, CHF, chronic pain, chronic indwelling suprapubic [place] presenting with 1 week of cough, congestion, shortness of breath.  Reportedly oxygen sat 70% at home but now on her regular oxygen regimen.  No fevers or chills, nausea or vomiting.    The following PMH/PSH/SocHx/FamHx has been reviewed by myself:    Past Medical History:   Diagnosis Date    Anticoagulant long-term use     Anxiety     Arthritis     Bilateral lower extremity edema     severe chronic    Carotid artery occlusion     Cataract     CHF (congestive heart failure)     Coronary artery disease     subtotalled LAD with collateral    Depression     Fever blister     Hard of hearing     Hypokalemia 1/9/2023    Hyponatremia 2/4/2022    Hypothyroid     Iron deficiency anemia     Lumbar radiculopathy     with chronic pain    Ocular migraine     Other emphysema 5/22/2023    Renal disorder     Sleep apnea     cpap     Past Surgical History:   Procedure Laterality Date    ADENOIDECTOMY      BACK SURGERY      x 12    CARDIAC CATHETERIZATION  2016    subtotalled LAD with right to left collaterals    CATARACT EXTRACTION W/  INTRAOCULAR LENS IMPLANT Left     Dr Coleman     CYSTOSCOPIC LITHOLAPAXY N/A 6/27/2019    Procedure: CYSTOLITHOLAPAXY;  Surgeon: Shireen Mayo MD;  Location: [facility] OR [address] [place] [address];  Service: Urology;  Laterality: N/A;    CYSTOSCOPIC LITHOLAPAXY N/A 9/3/2019    Procedure:  CYSTOLITHOLAPAXY;  Surgeon: Shireen Mayo MD;  Location: [facility] OR 2ND FLR;  Service: Urology;  Laterality: N/A;    CYSTOSCOPY N/A 7/13/2021    Procedure: CYSTOSCOPY;  Surgeon: Shireen Mayo MD;  Location: [facility] OR 1ST FLR;  Service: Urology;  Laterality: N/A;    CYSTOSCOPY  11/16/2021    Procedure: CYSTOSCOPY;  Surgeon: Shireen Mayo MD;  Location: [facility] OR 1ST FLR;  Service: Urology;;    CYSTOSCOPY  7/19/2022    Procedure: CYSTOSCOPY;  Surgeon: Shireen Mayo MD;  Location: [facility] OR 1ST FLR;  Service: Urology;;    CYSTOSCOPY WITH INJECTION OF PERIURETHRAL BULKING AGENT  7/19/2022    Procedure: CYSTOSCOPY, WITH PERIURETHRAL BULKING AGENT INJECTION-MACROPLASTIQUE;  Surgeon: Shireen Mayo MD;  Location: [facility] OR 1ST FLR;  Service: Urology;;    CYSTOSCOPY WITH INJECTION OF PERIURETHRAL BULKING AGENT N/A 3/28/2023    Procedure: CYSTOSCOPY, WITH PERIURETHRAL BULKING AGENT INJECTION;  Surgeon: Shireen Mayo MD;  Location: [facility] OR 1ST FLR;  Service: Urology;  Laterality: N/A;  [place]    CYSTOSCOPY,WITH BOTULINUM TOXIN INJECTION N/A 12/13/2022    Procedure: CYSTOSCOPY,WITH BOTULINUM TOXIN INJECTION;  Surgeon: Shireen Mayo MD;  Location: [facility] OR 1ST FLR;  Service: Urology;  Laterality: N/A;  300 U    CYSTOSCOPY,WITH BOTULINUM TOXIN INJECTION N/A 3/28/2023    Procedure: CYSTOSCOPY,WITH BOTULINUM TOXIN INJECTION;  Surgeon: Shireen Mayo MD;  Location: [facility] OR 1ST FLR;  Service: Urology;  Laterality: N/A;  45 MIN.    300 UNITS    ESOPHAGOGASTRODUODENOSCOPY N/A 5/23/2018    Procedure: ESOPHAGOGASTRODUODENOSCOPY (EGD);  Surgeon: Roseanne Vance MD;  Location: [facility] (4TH FLR);  Service: Endoscopy;  Laterality: N/A;  r/s 'd per Dr. Vance due to family emergency- ER    ESOPHAGOGASTRODUODENOSCOPY N/A 6/23/2023    Procedure: EGD (ESOPHAGOGASTRODUODENOSCOPY);  Surgeon: Juaquin Barry MD;  Location: [facility] ([address] [place] [address]);  Service: Endoscopy;  Laterality: N/A;  Refferal: ROSEANNE VANCE   "tele enc 5/18/23  dx: history of a Nissen fundoplication  Additional Scheduling Information:  On home oxygen 2nd floor for airway protection also with a pain pump elevated BMI close to 40   Prep Gastroparesis   ins    HYSTERECTOMY  1975    endometriosis    INJECTION OF BOTULINUM TOXIN TYPE A  7/13/2021    Procedure: INJECTION, BOTULINUM TOXIN, 200units;  Surgeon: Shireen Mayo MD;  Location: [facility] OR [address] [place] [address];  Service: Urology;;    INJECTION OF BOTULINUM TOXIN TYPE A  11/16/2021    Procedure: INJECTION, BOTULINUM TOXIN, 200units;  Surgeon: Shireen Mayo MD;  Location: [facility] OR [address] [place] [address];  Service: Urology;;    INJECTION OF BOTULINUM TOXIN TYPE A  7/19/2022    Procedure: INJECTION, BOTULINUM TOXIN, 300 units ;  Surgeon: Shireen Mayo MD;  Location: [facility] OR [address] [place] [address];  Service: Urology;;    INSERTION, SUPRAPUBIC CATHETER N/A 12/13/2022    Procedure: INSERTION, SUPRAPUBIC CATHETER;  Surgeon: Shireen Mayo MD;  Location: [facility] OR [address] [place] [address];  Service: Urology;  Laterality: N/A;  exchange    pain pump placement      SQ Dilaudid Pump managed by Dr. Hillman, Pain Management    REMOVAL OF BONE SPUR OF FOOT Bilateral 9/16/2022    Procedure: EXCISION ARTHRITIC BONE, BILATERAL FOOT;  Surgeon: NICOLA Mcgrath;  Location: [facility];  Service: Podiatry;  Laterality: Bilateral;    REPLACEMENT OF CATHETER N/A 10/31/2019    Procedure: REPLACEMENT, CATHETER-SUPRAPUBIC;  Surgeon: Shireen Mayo MD;  Location: [address];  Service: Urology;  Laterality: N/A;    SPINAL CORD STIMULATOR REMOVAL      before Larissa    SPINE SURGERY  5-13-13    CERVICAL FUSION    TONSILLECTOMY       Social History     Tobacco Use    Smoking status: Never    Smokeless tobacco: Never   Substance Use Topics    Alcohol use: Never    Drug use: Yes     Types: Marijuana     Comment: "medical marijuana gummies"     Family History   Problem Relation Age of Onset    Cancer Mother 55        breast    Cancer Father         esophagus,had " laryngectomy    Esophageal cancer Father     Parkinsonism Maternal Grandmother     Tremor Maternal Grandmother     No Known Problems Brother     No Known Problems Brother     Heart disease Maternal Uncle     Colon cancer Maternal Uncle         Less than 60    No Known Problems Sister     No Known Problems Maternal Aunt     Cirrhosis Paternal Aunt         ETOH    Liver disease Paternal Aunt         ETOH    Liver disease Paternal Uncle         ETOH    Cirrhosis Paternal Uncle         ETOH    No Known Problems Maternal Grandfather     No Known Problems Paternal Grandmother     No Known Problems Paternal Grandfather     Melanoma Neg Hx     Amblyopia Neg Hx     Blindness Neg Hx     Cataracts Neg Hx     Diabetes Neg Hx     Glaucoma Neg Hx     Hypertension Neg Hx     Macular degeneration Neg Hx     Retinal detachment Neg Hx     Strabismus Neg Hx     Stroke Neg Hx     Thyroid disease Neg Hx     Celiac disease Neg Hx     Colon polyps Neg Hx     Cystic fibrosis Neg Hx     Crohn's disease Neg Hx     Inflammatory bowel disease Neg Hx     Liver cancer Neg Hx     Rectal cancer Neg Hx     Stomach cancer Neg Hx     Ulcerative colitis Neg Hx     Lymphoma Neg Hx        Allergies reviewed:  Review of patient's allergies indicates:   Allergen Reactions    (d)-limonene flavor      Other reaction(s): difficult intubation  Other reaction(s): Difficulty breathing    Bactrim [sulfamethoxazole-trimethoprim] Anaphylaxis    Benadryl [diphenhydramine hcl] Shortness Of Breath    Fentanyl Itching, Nausea And Vomiting and Swelling             Imitrex [sumatriptan succinate] Shortness Of Breath    Percocet [oxycodone-acetaminophen] Shortness Of Breath    Topamax [topiramate] Shortness Of Breath    Vancomycin Anaphylaxis     Rash    Butorphanol tartrate     Darvocet a500 [propoxyphene n-acetaminophen]      Other reaction(s): Difficulty breathing    Evening primrose (oenothera biennis)     Lyrica [pregabalin] Other (See Comments)     tremors    White  petrolatum-zinc     Zinc oxide-white petrolatum      Other reaction(s): Difficulty breathing    Latex, natural rubber Itching and Rash    Phenytoin Rash and Other (See Comments)     Trouble breathing       Medications reviewed:  Medication List with Changes/Refills   Current Medications    ALBUTEROL-IPRATROPIUM (DUO-NEB) 2.5 MG-0.5 MG/3 ML NEBULIZER SOLUTION    Take 3 mLs by nebulization every 6 (six) hours as needed for Wheezing or Shortness of Breath. Rescue    ASPIRIN (ECOTRIN) 81 MG EC TABLET    Take 1 tablet (81 mg total) by mouth once daily.    ATORVASTATIN (LIPITOR) 80 MG TABLET    Take 1 tablet (80 mg total) by mouth once daily.    BUTALBITAL-ACETAMINOPHEN-CAFFEINE -40 MG (FIORICET, ESGIC) -40 MG PER TABLET    Take 1 tablet by mouth daily as needed.    CYANOCOBALAMIN, VITAMIN B-12, 5,000 MCG SUBL    Place 1 tablet under the tongue once daily.    DOCUSATE SODIUM (COLACE) 100 MG CAPSULE    Take 200 mg by mouth every evening.    FERROUS GLUCONATE (FERGON) 324 MG TABLET    Take 1 tablet (324 mg total) by mouth daily with breakfast.    FLUDROCORTISONE (FLORINEF) 0.1 MG TAB    Take a half-tablet (50 mcg) by mouth once daily    FLUOXETINE 20 MG CAPSULE    Take 3 capsules (60 mg total) by mouth once daily.    FLUTICASONE PROPIONATE (FLONASE) 50 MCG/ACTUATION NASAL SPRAY    2 sprays (100 mcg total) by Each Nostril route once daily.    FUROSEMIDE (LASIX) 40 MG TABLET    Take 1 tablet (40 mg total) by mouth 2 (two) times a day.    HYDROCODONE-ACETAMINOPHEN (NORCO)  MG PER TABLET    Take 1 tablet by mouth every 6 (six) hours as needed for breakthrough pain.    HYDROCORTISONE (CORTEF) 10 MG TAB    Take 1 tablet (10 mg total) by mouth every evening.    HYDROXYZINE (ATARAX) 50 MG TABLET    Take 1 tablet (50 mg total) by mouth every 4 (four) hours as needed for Anxiety.    INTRATHECAL PAIN PUMP COMPOUND    Hydromorphone (7.5 mg/mL) infusion at 8.59 mg/day (0.3578 mg/hr) out of a total reservoir volume of  40 mL  Pump filled monthly    KETOROLAC (TORADOL) 10 MG TABLET    Take 10 mg by mouth daily as needed.    KETOROLAC (TORADOL) 30 MG/ML (1 ML) INJECTION    Inject 30 mg into the vein once.    LEVOTHYROXINE (SYNTHROID) 150 MCG TABLET    Take 1 tablet (150 mcg total) by mouth before breakfast.    LIDOCAINE (LIDODERM) 5 %    Place 1 patch onto the skin once daily. Remove & Discard patch within 12 hours or as directed by MD    LIOTHYRONINE (CYTOMEL) 25 MCG TAB    Take 1 tablet (25 mcg total) by mouth once daily.    MUPIROCIN (BACTROBAN) 2 % OINTMENT    Apply topically 2 (two) times daily. Apply small amount to each nostril twice a day for 5 days as instructed. for 5 days    NITROGLYCERIN (NITROSTAT) 0.4 MG SL TABLET    Place 0.4 mg under the tongue every 5 (five) minutes as needed for Chest pain.    ONDANSETRON (ZOFRAN-ODT) 4 MG TBDL    Take 4 mg by mouth every 4 to 6 hours as needed.    PANTOPRAZOLE (PROTONIX) 40 MG TABLET    Take 1 tablet (40 mg total) by mouth once daily.    POTASSIUM CHLORIDE (MICRO-K) 10 MEQ CPSR    Take 2 capsules (20 mEq total) by mouth once daily.    PROMETHAZINE (PHENERGAN) 25 MG TABLET    Take 25 mg by mouth every 6 (six) hours as needed for Nausea.    QUETIAPINE (SEROQUEL) 100 MG TAB    TAKE 2 TABLETS (200 MG) BY MOUTH NIGHTLY    SENNA (SENNA LAX) 8.6 MG TABLET    Take 2 tablets by mouth 2 (two) times daily.    TIOTROPIUM BROMIDE (SPIRIVA RESPIMAT) 2.5 MCG/ACTUATION INHALER    Inhale 2 puffs into the lungs once daily. Controller    TIZANIDINE (ZANAFLEX) 4 MG TABLET    Take 4 mg by mouth 2 (two) times daily as needed.    TRAZODONE (DESYREL) 300 MG TABLET    Take 1 tablet (300 mg total) by mouth every evening.   Discontinued Medications    0.9 % SODIUM CHLORIDE (SODIUM CHLORIDE 0.9%) SOLUTION        AMOXICILLIN-CLAVULANATE 500-125MG (AUGMENTIN) 500-125 MG TAB        AMOXICILLIN-CLAVULANATE 875-125MG (AUGMENTIN) 875-125 MG PER TABLET        CEFDINIR (OMNICEF) 300 MG CAPSULE        CEFPODOXIME  (VANTIN) 200 MG TABLET        CEPHALEXIN (KEFLEX) 250 MG/5 ML SUSPENSION        CEPHALEXIN (KEFLEX) 500 MG CAPSULE        CIPROFLOXACIN HCL (CIPRO) 500 MG TABLET        CIPROFLOXACIN HCL (CIPRO) 750 MG TABLET        CLINDAMYCIN (CLEOCIN) 300 MG CAPSULE        DAPTOMYCIN (CUBICIN) 500 MG INJECTION        DARIFENACIN (ENABLEX) 7.5 MG TB24        DICLOXACILLIN (DYNAPEN) 500 MG CAPSULE    TAKE 1 CAPSULE BY MOUTH 4 TIMES A DAY FOR 8 DAYS    DIPHENOXYLATE-ATROPINE 2.5-0.025 MG (LOMOTIL) 2.5-0.025 MG PER TABLET        DOXYCYCLINE (MONODOX) 100 MG CAPSULE        DOXYCYCLINE (VIBRA-TABS) 100 MG TABLET        ERGOCALCIFEROL (ERGOCALCIFEROL) 50,000 UNIT CAP        FERROUS SULFATE (FEOSOL) 325 MG (65 MG IRON) TAB TABLET        FLUCONAZOLE (DIFLUCAN) 100 MG TABLET        FLUCONAZOLE (DIFLUCAN) 150 MG TAB        FLUCONAZOLE (DIFLUCAN) 200 MG TAB        FUROSEMIDE (LASIX) 10 MG/ML INJECTION        FUROSEMIDE (LASIX) 20 MG TABLET        GABAPENTIN (NEURONTIN) 100 MG CAPSULE        KETOCONAZOLE (NIZORAL) 2 % CREAM        LEVOFLOXACIN (LEVAQUIN) 500 MG TABLET    Take 500 mg by mouth once daily.    LEVOFLOXACIN (LEVAQUIN) 750 MG TABLET        LEVOTHYROXINE (SYNTHROID) 125 MCG TABLET        LEVOTHYROXINE (SYNTHROID) 137 MCG TAB TABLET        METHOCARBAMOL (ROBAXIN) 750 MG TAB        MIRABEGRON (MYRBETRIQ) 50 MG TB24        NITROFURANTOIN, MACROCRYSTAL-MONOHYDRATE, (MACROBID) 100 MG CAPSULE        OLANZAPINE (ZYPREXA) 5 MG TABLET        OXYBUTYNIN (DITROPAN XL) 15 MG TR24        POTASSIUM CHLORIDE SA (K-DUR,KLOR-CON M) 10 MEQ TABLET        QUETIAPINE (SEROQUEL) 25 MG TAB        TIZANIDINE (ZANAFLEX) 4 MG TABLET    Take 1 tablet by mouth 2 (two) times daily as needed.    TORSEMIDE (DEMADEX) 10 MG TAB        TORSEMIDE (DEMADEX) 100 MG TAB        TRAZODONE (DESYREL) 100 MG TABLET        VALACYCLOVIR (VALTREX) 1000 MG TABLET        VIBEGRON (GEMTESA) 75 MG TAB           ROS  Review of Systems:    Constitutional:  Negative for fever.   HENT:   Negative for sore throat.    Eyes:  Negative for redness.   Respiratory:  Positive for shortness of breath.    Cardiovascular:  Negative for chest pain.   Gastrointestinal:  Negative for nausea.   Genitourinary:  Negative for dysuria.   Musculoskeletal:  Negative for back pain.   Skin:  Negative for rash.   Neurological:  Negative for weakness.   Hematological:  Does not bruise/bleed easily.   Psychiatric/Behavioral:  The patient is not nervous/anxious.      Physical Exam   Physical Exam    Initial Vitals [07/08/23 1618]   BP Pulse Resp Temp SpO2   (!) 104/51 99 18 99.3 °F (37.4 °C) 99 %      MAP       --           Triage vital signs reviewed.    Constitutional:  Chronically ill-appearing.  Not in acute distress.  HENT: Normocephalic, atraumatic. Moist mucous membranes.  Eyes: No conjunctival injection.  Resp: Normal respiratory effort, breathing unlabored.  Crackles [facility] up lungs bilaterally.  Cardio: Regular rate and rhythm.  GI: Abdomen non-distended.   MSK:  Bilateral lower extremity edema to mid thigh.  Skin: Warm and dry. No rashes or lesions noted.  Neuro: Awake and alert. Moves all extremities.    ED Course   Procedures    Medical Decision Making    History Acquisition     The history is provided by the patient.   Assessment requiring an independent historian and why historian was needed:  Family at bedside, patient is hard of hearing.    Review of prior external/non ED notes:  Clinic notes from Urology, podiatry, allergy    Differential Diagnoses   Based on available information and initial assessment, the working Differential Diagnosis includes, but is not limited to:  PE, MI/ACS, pneumothorax, pericardial effusion/tamonade, pneumonia, lung abscess, pericarditis/myocarditis, pleural effusion, lung mass, CHF exacerbation, asthma exacerbation, COPD exacerbation, aspirated/ingested foreign body, airway obstruction, CO poisoning, anemia, metabolic derangement, allergy/atopy, influenza, viral URI, viral  syndrome.  .    EKG   EKG ordered and independently reviewed by me:   Sinus bradycardia, rate 54, T-wave flattening lateral leads, no STEMI, normal intervals, normal axis    Labs   Lab tests ordered and independently reviewed by me:    Labs Reviewed   CBC W/ AUTO DIFFERENTIAL - Abnormal; Notable for the following components:       Result Value    RBC 3.91 (*)     Hemoglobin 10.2 (*)     Hematocrit 32.2 (*)     MCH 26.1 (*)     MCHC 31.7 (*)     RDW 14.6 (*)     Mono % 15.8 (*)     All other components within normal limits   COMPREHENSIVE METABOLIC PANEL - Abnormal; Notable for the following components:    Potassium 3.1 (*)     BUN 5 (*)     Albumin 2.9 (*)     ALT 7 (*)     All other components within normal limits   B-TYPE NATRIURETIC PEPTIDE - Abnormal; Notable for the following components:     (*)     All other components within normal limits   TROPONIN I         Imaging   Imaging ordered and independently reviewed by me:   Imaging Results              X-Ray Chest AP Portable (Final result)  Result time 07/08/23 16:48:35      Final result by Enmanuel Noble MD (07/08/23 16:48:35)                   Impression:      Cardiomegaly with decreased pulmonary edema.      Electronically signed by: Enmanuel Noble MD  Date:    07/08/2023  Time:    16:48               Narrative:    EXAMINATION:  XR CHEST AP PORTABLE    CLINICAL HISTORY:  CHF;    TECHNIQUE:  Single frontal view of the chest was performed.    COMPARISON:  05/24/2023.    FINDINGS:  There are postoperative changes in the cervical spine.  There is an intrathecal lead in place in the lower thoracic spine.    The trachea is unremarkable.  The cardiomediastinal silhouette is enlarged.  There is no evidence of free air beneath the hemidiaphragms.  There are trace bilateral pleural effusions.  There is no evidence of a pneumothorax.  There is no evidence of pneumomediastinum.  There is decrease in the degree of pulmonary vascular congestion.  There is no focal  consolidation.  There are degenerative changes in the osseous structures.                                           Additional Consideration   Tasha Hawley  presents to the emergency Department today with shortness of breath, cough, exam is consistent with fluid overload.  Plan for labs, EKG, chest x-ray, diuresis.  Will likely need admission.    Additional testing considered but not indicated during the course of this workup: further imaging and labwork, not indicated  Co-morbidities/chronic illness/exacerbation of chronic illness considered which impacted clinical decision making:  COPD, CHF, advanced stage  Procedures done in the ED or plan for the OR: No  Social determinants of care considered during development of treatment plan include: Decreased medical literacy  Discussion of management or test interpretation with external provider: Yes, describe:  Admitting hospitalist  DNR discussion: No    The patient's list of active medications and allergies as documented per RN staff has been reviewed.  Medications given in the ED and/or prescribed:   Medications   furosemide injection 80 mg (80 mg Intravenous Given 7/8/23 1729)             ED Course as of 07/08/23 1902   Sat Jul 08, 2023   1851 CBC auto differential(!)  At baseline [CS]   1851 Comprehensive metabolic panel(!)  Independently interpreted by me, unremarkable    [CS]   1851 Troponin I  neg [CS]   1851 Brain natriuretic peptide(!)  elevated [CS]   1851 BNP(!): 162 [CS]   1851 Patient with markedly elevated BNP and crackles on exam, concerning for fluid overload.  Lasix given, will admit for diuresis. [CS]      ED Course User Index  [CS] Shannon Kaur MD       Explanation of disposition: Admit    Clinical Impression:     1. Congestive heart failure, unspecified HF chronicity, unspecified heart failure type    2. Shortness of breath       ED Disposition Condition    Observation Stable               Shannon Kaur MD  07/08/23 1902

## 2023-07-08 NOTE — PROGRESS NOTES
Tasha your EGD pathology looks benign    Stomach, biopsy:   Antral and oxyntic mucosa with mild chronic inflammation.     No H.pylori identified on H&E stain slide.     No intestinal metaplasia or dysplasia.    Comment: Interp By Kalpesh Montano M.D., Signed on 06/27/2023

## 2023-07-08 NOTE — PHARMACY MED REC
"Admission Medication History     The home medication history was taken by Radha Sevilla CPhT.    Medication history obtained from, Patient's Daughter Verified    You may go to "Admission" then "Reconcile Home Medications" tabs to review and/or act upon these items.     The home medication list has been updated by the Pharmacy department.   Please read ALL comments highlighted in yellow.   Please address this information as you see fit.    Feel free to contact us if you have any questions or require assistance.      The medications listed below were removed from the home medication list.  Please reorder if appropriate:  Patient reports no longer taking the following medication(s):  0.9% sodium chloride solution  Augmentin 500-125 mg and 875-125 mg  Cefdinir 300 mg  Cefpodoxime 200 mg  Cephalexin 300 mg and 250 mg/5ml susp   Cipro 500 mg and 750 mg  Daptomycin 500 mg  Darifenacin 7.5 mg  Dicloxacillin 500 mg  Lomotil 2.5-0.025 mg  Doxycycline 100 mg  Vitamin D 50,00 unit  Fluconazole 100 mg, 150 mg. And 200 mg  Gabapentin 100 mg  Levaquin 500 mg and 750 mg  Methocarbamol 750 mg  Mirabegron 50 mg  Macrobid 100 mg  Olanzapine 5 mg  Oxybutynin XL 15 mg  Torsemide 10 mg and 100 mg  Valtrex 1000 mg  Vibegron 75 mg        Radha Sevilla CPhT.  Ext [phone]               .        "

## 2023-07-09 LAB
ANION GAP SERPL CALC-SCNC: 12 MMOL/L (ref 8–16)
BASOPHILS # BLD AUTO: 0.01 K/UL (ref 0–0.2)
BASOPHILS NFR BLD: 0.3 % (ref 0–1.9)
BUN SERPL-MCNC: 4 MG/DL (ref 8–23)
CALCIUM SERPL-MCNC: 8.8 MG/DL (ref 8.7–10.5)
CHLORIDE SERPL-SCNC: 102 MMOL/L (ref 95–110)
CO2 SERPL-SCNC: 26 MMOL/L (ref 23–29)
CREAT SERPL-MCNC: 0.8 MG/DL (ref 0.5–1.4)
DIFFERENTIAL METHOD: ABNORMAL
EOSINOPHIL # BLD AUTO: 0.1 K/UL (ref 0–0.5)
EOSINOPHIL NFR BLD: 3.6 % (ref 0–8)
ERYTHROCYTE [DISTWIDTH] IN BLOOD BY AUTOMATED COUNT: 14.7 % (ref 11.5–14.5)
EST. GFR  (NO RACE VARIABLE): >60 ML/MIN/1.73 M^2
ESTIMATED AVG GLUCOSE: 103 MG/DL (ref 68–131)
GLUCOSE SERPL-MCNC: 118 MG/DL (ref 70–110)
HBA1C MFR BLD: 5.2 % (ref 4–5.6)
HCT VFR BLD AUTO: 31.2 % (ref 37–48.5)
HGB BLD-MCNC: 9.4 G/DL (ref 12–16)
IMM GRANULOCYTES # BLD AUTO: 0.01 K/UL (ref 0–0.04)
IMM GRANULOCYTES NFR BLD AUTO: 0.3 % (ref 0–0.5)
LYMPHOCYTES # BLD AUTO: 0.8 K/UL (ref 1–4.8)
LYMPHOCYTES NFR BLD: 25.6 % (ref 18–48)
MAGNESIUM SERPL-MCNC: 1.7 MG/DL (ref 1.6–2.6)
MCH RBC QN AUTO: 25.1 PG (ref 27–31)
MCHC RBC AUTO-ENTMCNC: 30.1 G/DL (ref 32–36)
MCV RBC AUTO: 83 FL (ref 82–98)
MONOCYTES # BLD AUTO: 0.5 K/UL (ref 0.3–1)
MONOCYTES NFR BLD: 16.8 % (ref 4–15)
NEUTROPHILS # BLD AUTO: 1.7 K/UL (ref 1.8–7.7)
NEUTROPHILS NFR BLD: 53.4 % (ref 38–73)
NRBC BLD-RTO: 0 /100 WBC
PLATELET # BLD AUTO: 192 K/UL (ref 150–450)
PLATELET BLD QL SMEAR: ABNORMAL
PMV BLD AUTO: 10.4 FL (ref 9.2–12.9)
POTASSIUM SERPL-SCNC: 3.8 MMOL/L (ref 3.5–5.1)
RBC # BLD AUTO: 3.75 M/UL (ref 4–5.4)
SODIUM SERPL-SCNC: 140 MMOL/L (ref 136–145)
WBC # BLD AUTO: 3.09 K/UL (ref 3.9–12.7)

## 2023-07-09 PROCEDURE — 25000242 PHARM REV CODE 250 ALT 637 W/ HCPCS: Performed by: NURSE PRACTITIONER

## 2023-07-09 PROCEDURE — 97535 SELF CARE MNGMENT TRAINING: CPT

## 2023-07-09 PROCEDURE — 25000003 PHARM REV CODE 250: Performed by: NURSE PRACTITIONER

## 2023-07-09 PROCEDURE — 85025 COMPLETE CBC W/AUTO DIFF WBC: CPT | Performed by: NURSE PRACTITIONER

## 2023-07-09 PROCEDURE — 36415 COLL VENOUS BLD VENIPUNCTURE: CPT | Performed by: NURSE PRACTITIONER

## 2023-07-09 PROCEDURE — 99900035 HC TECH TIME PER 15 MIN (STAT)

## 2023-07-09 PROCEDURE — 97161 PT EVAL LOW COMPLEX 20 MIN: CPT

## 2023-07-09 PROCEDURE — 96374 THER/PROPH/DIAG INJ IV PUSH: CPT | Mod: 59

## 2023-07-09 PROCEDURE — 27000221 HC OXYGEN, UP TO 24 HOURS

## 2023-07-09 PROCEDURE — 94761 N-INVAS EAR/PLS OXIMETRY MLT: CPT

## 2023-07-09 PROCEDURE — 83735 ASSAY OF MAGNESIUM: CPT | Performed by: NURSE PRACTITIONER

## 2023-07-09 PROCEDURE — 63600175 PHARM REV CODE 636 W HCPCS: Performed by: NURSE PRACTITIONER

## 2023-07-09 PROCEDURE — 80048 BASIC METABOLIC PNL TOTAL CA: CPT | Performed by: NURSE PRACTITIONER

## 2023-07-09 PROCEDURE — 97165 OT EVAL LOW COMPLEX 30 MIN: CPT

## 2023-07-09 PROCEDURE — 97530 THERAPEUTIC ACTIVITIES: CPT

## 2023-07-09 PROCEDURE — 96372 THER/PROPH/DIAG INJ SC/IM: CPT | Performed by: NURSE PRACTITIONER

## 2023-07-09 PROCEDURE — 25000003 PHARM REV CODE 250: Performed by: HOSPITALIST

## 2023-07-09 PROCEDURE — 83036 HEMOGLOBIN GLYCOSYLATED A1C: CPT | Performed by: NURSE PRACTITIONER

## 2023-07-09 PROCEDURE — 96376 TX/PRO/DX INJ SAME DRUG ADON: CPT

## 2023-07-09 PROCEDURE — 94660 CPAP INITIATION&MGMT: CPT

## 2023-07-09 PROCEDURE — G0378 HOSPITAL OBSERVATION PER HR: HCPCS

## 2023-07-09 PROCEDURE — 27000190 HC CPAP FULL FACE MASK W/VALVE

## 2023-07-09 RX ORDER — ENOXAPARIN SODIUM 100 MG/ML
40 INJECTION SUBCUTANEOUS EVERY 12 HOURS
Status: DISCONTINUED | OUTPATIENT
Start: 2023-07-09 | End: 2023-07-10

## 2023-07-09 RX ORDER — LOPERAMIDE HYDROCHLORIDE 2 MG/1
2 CAPSULE ORAL 4 TIMES DAILY PRN
Status: DISCONTINUED | OUTPATIENT
Start: 2023-07-09 | End: 2023-07-10 | Stop reason: HOSPADM

## 2023-07-09 RX ADMIN — LEVOTHYROXINE SODIUM 150 MCG: 75 TABLET ORAL at 07:07

## 2023-07-09 RX ADMIN — ATORVASTATIN CALCIUM 80 MG: 40 TABLET, FILM COATED ORAL at 09:07

## 2023-07-09 RX ADMIN — HYDROXYZINE HYDROCHLORIDE 50 MG: 25 TABLET ORAL at 12:07

## 2023-07-09 RX ADMIN — FLUDROCORTISONE ACETATE 100 MCG: 0.1 TABLET ORAL at 09:07

## 2023-07-09 RX ADMIN — TIZANIDINE 4 MG: 4 TABLET ORAL at 12:07

## 2023-07-09 RX ADMIN — HYDROCORTISONE 10 MG: 5 TABLET ORAL at 08:07

## 2023-07-09 RX ADMIN — CYANOCOBALAMIN TAB 1000 MCG 2000 MCG: 1000 TAB at 09:07

## 2023-07-09 RX ADMIN — LIOTHYRONINE SODIUM 25 MCG: 25 TABLET ORAL at 09:07

## 2023-07-09 RX ADMIN — MUPIROCIN: 20 OINTMENT TOPICAL at 08:07

## 2023-07-09 RX ADMIN — PANTOPRAZOLE SODIUM 40 MG: 40 TABLET, DELAYED RELEASE ORAL at 09:07

## 2023-07-09 RX ADMIN — ENOXAPARIN SODIUM 40 MG: 40 INJECTION SUBCUTANEOUS at 08:07

## 2023-07-09 RX ADMIN — POTASSIUM CHLORIDE 30 MEQ: 750 TABLET, EXTENDED RELEASE ORAL at 12:07

## 2023-07-09 RX ADMIN — DOCUSATE SODIUM 200 MG: 100 CAPSULE, LIQUID FILLED ORAL at 08:07

## 2023-07-09 RX ADMIN — QUETIAPINE FUMARATE 200 MG: 100 TABLET ORAL at 08:07

## 2023-07-09 RX ADMIN — FLUTICASONE PROPIONATE 100 MCG: 50 SPRAY, METERED NASAL at 09:07

## 2023-07-09 RX ADMIN — ENOXAPARIN SODIUM 40 MG: 40 INJECTION SUBCUTANEOUS at 09:07

## 2023-07-09 RX ADMIN — FLUOXETINE 60 MG: 20 CAPSULE ORAL at 09:07

## 2023-07-09 RX ADMIN — FUROSEMIDE 80 MG: 10 INJECTION, SOLUTION INTRAMUSCULAR; INTRAVENOUS at 09:07

## 2023-07-09 RX ADMIN — LOPERAMIDE HYDROCHLORIDE 2 MG: 2 CAPSULE ORAL at 03:07

## 2023-07-09 RX ADMIN — HYDROCODONE BITARTRATE AND ACETAMINOPHEN 1 TABLET: 10; 325 TABLET ORAL at 07:07

## 2023-07-09 RX ADMIN — POTASSIUM CHLORIDE 20 MEQ: 1500 TABLET, EXTENDED RELEASE ORAL at 09:07

## 2023-07-09 RX ADMIN — HYDROCODONE BITARTRATE AND ACETAMINOPHEN 1 TABLET: 10; 325 TABLET ORAL at 04:07

## 2023-07-09 RX ADMIN — TRAZODONE HYDROCHLORIDE 300 MG: 100 TABLET ORAL at 08:07

## 2023-07-09 RX ADMIN — HYDROCODONE BITARTRATE AND ACETAMINOPHEN 1 TABLET: 10; 325 TABLET ORAL at 11:07

## 2023-07-09 RX ADMIN — ASPIRIN 81 MG: 81 TABLET, COATED ORAL at 09:07

## 2023-07-09 RX ADMIN — MUPIROCIN: 20 OINTMENT TOPICAL at 09:07

## 2023-07-09 RX ADMIN — FUROSEMIDE 80 MG: 10 INJECTION, SOLUTION INTRAMUSCULAR; INTRAVENOUS at 08:07

## 2023-07-09 RX ADMIN — Medication 324 MG: at 09:07

## 2023-07-09 NOTE — PLAN OF CARE
Problem: Physical Therapy  Goal: Physical Therapy Goal  Description: Goals to be met by: 2023     Patient will increase functional independence with mobility by performin. Supine to sit with Modified [place]  2. Sit to supine with Modified [place]  3. Sit to stand transfer with Modified [place]  4. Gait  x 100 feet with Modified [place] using Rolling Walker.   5. Stand for 15 minutes with Modified [place] using Rolling Walker    Outcome: Ongoing, Progressing     Tasha was moving around her room with severe FB position at the waist and using walls and furniture to mobilzie with slightly unsafe mechanics when entering the room for eval. She reports that she usually has a rollator but it is not able to fit inside her bathrooms so this is usually how she gets through her home. She was able to complete all activity, but when we were preparing for ambulation, her blood pressure was 86/48 and was not safe for ambulation at this time. We will keep her on caseload to better assess gait once her BP is more stable

## 2023-07-09 NOTE — H&P
[facility] Medicine  History & Physical    Patient Name: Tasha Hawley  MRN: [id number]  Patient Class: OP- Observation  Admission Date: 7/8/2023  Attending Physician: Nic Mistry, *   Primary Care Provider: Mesfin Hodges Ii, MD         Patient information was obtained from patient, past medical records and ER records.     Subjective:     Principal Problem:Congestive heart failure    Chief Complaint:   Chief Complaint   Patient presents with    Shortness of Breath     Pt states that she has a lot of chest congestion and difficulty breathing. Pt is on o2 at home 4lpm. Pt does have a issue with fluid accumulation in her legs and has difficulty walking. Pt is afebrile at this time.         HPI: Tasha Hawley is a 67-year-old female with a history of COPD on 4-5 L, chronic bilateral lower extremity edema, CHF, chronic pain, chronic indwelling suprapubic [place]. She presented to the ED with 1 week of cough, congestion, shortness of breath. Patient is very familiar to the [facility] Medicine team. Reportedly her oxygen sat 70% was at home but improved to upper 90s on her regular oxygen regimen.  She denies fevers, chills, nausea, vomiting, or CP. She reports that she had difficulty with her CPAP last night which may have precipitated her SOB. She also has significant BLE lymphedema, which is chronic, but appears slightly worse than her previous admission in May. She reports that she has been getting her leg's wrapped weekly but wound care didn't come this week. She also reports compliance with her lasix. However, her diet compliance is in question. Will admit for diuresis.      Past Medical History:   Diagnosis Date    Anticoagulant long-term use     Anxiety     Arthritis     Bilateral lower extremity edema     severe chronic    Carotid artery occlusion     Cataract     CHF (congestive heart failure)     Coronary artery disease     subtotalled LAD with collateral     Depression     Fever blister     Hard of hearing     Hypokalemia 1/9/2023    Hyponatremia 2/4/2022    Hypothyroid     Iron deficiency anemia     Lumbar radiculopathy     with chronic pain    Ocular migraine     Other emphysema 5/22/2023    Renal disorder     Sleep apnea     cpap       Past Surgical History:   Procedure Laterality Date    ADENOIDECTOMY      BACK SURGERY      x 12    CARDIAC CATHETERIZATION  2016    subtotalled LAD with right to left collaterals    CATARACT EXTRACTION W/  INTRAOCULAR LENS IMPLANT Left     Dr Coleman     CYSTOSCOPIC LITHOLAPAXY N/A 6/27/2019    Procedure: CYSTOLITHOLAPAXY;  Surgeon: Shireen Mayo MD;  Location: [facility] OR 2ND FLR;  Service: Urology;  Laterality: N/A;    CYSTOSCOPIC LITHOLAPAXY N/A 9/3/2019    Procedure: CYSTOLITHOLAPAXY;  Surgeon: Shireen Mayo MD;  Location: [facility] OR 2ND FLR;  Service: Urology;  Laterality: N/A;    CYSTOSCOPY N/A 7/13/2021    Procedure: CYSTOSCOPY;  Surgeon: Shireen Mayo MD;  Location: [facility] OR 1ST FLR;  Service: Urology;  Laterality: N/A;    CYSTOSCOPY  11/16/2021    Procedure: CYSTOSCOPY;  Surgeon: Shireen Mayo MD;  Location: [facility] OR 1ST FLR;  Service: Urology;;    CYSTOSCOPY  7/19/2022    Procedure: CYSTOSCOPY;  Surgeon: Shireen Mayo MD;  Location: [facility] OR 1ST FLR;  Service: Urology;;    CYSTOSCOPY WITH INJECTION OF PERIURETHRAL BULKING AGENT  7/19/2022    Procedure: CYSTOSCOPY, WITH PERIURETHRAL BULKING AGENT INJECTION-MACROPLASTIQUE;  Surgeon: Shireen Mayo MD;  Location: [facility] OR 1ST FLR;  Service: Urology;;    CYSTOSCOPY WITH INJECTION OF PERIURETHRAL BULKING AGENT N/A 3/28/2023    Procedure: CYSTOSCOPY, WITH PERIURETHRAL BULKING AGENT INJECTION;  Surgeon: Shireen Mayo MD;  Location: [facility] OR 1ST FLR;  Service: Urology;  Laterality: N/A;  [place]    CYSTOSCOPY,WITH BOTULINUM TOXIN INJECTION N/A 12/13/2022    Procedure: CYSTOSCOPY,WITH BOTULINUM TOXIN INJECTION;  Surgeon: Shireen Mayo MD;   Location: [facility] OR 1ST FLR;  Service: Urology;  Laterality: N/A;  300 U    CYSTOSCOPY,WITH BOTULINUM TOXIN INJECTION N/A 3/28/2023    Procedure: CYSTOSCOPY,WITH BOTULINUM TOXIN INJECTION;  Surgeon: Shireen Mayo MD;  Location: [facility] OR 1ST FLR;  Service: Urology;  Laterality: N/A;  45 MIN.    300 UNITS    ESOPHAGOGASTRODUODENOSCOPY N/A 5/23/2018    Procedure: ESOPHAGOGASTRODUODENOSCOPY (EGD);  Surgeon: Prince Vance MD;  Location: [facility] ENDO (4TH FLR);  Service: Endoscopy;  Laterality: N/A;  r/s 'd per Dr. Vance due to family emergency- ER    ESOPHAGOGASTRODUODENOSCOPY N/A 6/23/2023    Procedure: EGD (ESOPHAGOGASTRODUODENOSCOPY);  Surgeon: Juaquin Barry MD;  Location: [facility] ENDO (2ND FLR);  Service: Endoscopy;  Laterality: N/A;  Refferal: PRINCE VANCE  Order  tele enc 5/18/23  dx: history of a Nissen fundoplication  Additional Scheduling Information:  On home oxygen 2nd floor for airway protection also with a pain pump elevated BMI close to 40   Prep Gastroparesis   ins    HYSTERECTOMY  1975    endometriosis    INJECTION OF BOTULINUM TOXIN TYPE A  7/13/2021    Procedure: INJECTION, BOTULINUM TOXIN, 200units;  Surgeon: Shireen Mayo MD;  Location: [facility] OR [employer];  Service: Urology;;    INJECTION OF BOTULINUM TOXIN TYPE A  11/16/2021    Procedure: INJECTION, BOTULINUM TOXIN, 200units;  Surgeon: Shireen Mayo MD;  Location: [facility] [employer];  Service: Urology;;    INJECTION OF BOTULINUM TOXIN TYPE A  7/19/2022    Procedure: INJECTION, BOTULINUM TOXIN, 300 units ;  Surgeon: Shireen Mayo MD;  Location: [facility] OR [employer];  Service: Urology;;    INSERTION, SUPRAPUBIC CATHETER N/A 12/13/2022    Procedure: INSERTION, SUPRAPUBIC CATHETER;  Surgeon: Shireen Mayo MD;  Location: [facility] OR [employer];  Service: Urology;  Laterality: N/A;  exchange    pain pump placement      SQ Dilaudid Pump managed by Dr. Hillman, Pain Management    REMOVAL OF BONE SPUR OF FOOT Bilateral 9/16/2022     Procedure: EXCISION ARTHRITIC BONE, BILATERAL FOOT;  Surgeon: Adam Mcguire DPM;  Location: [facility] OR;  Service: Podiatry;  Laterality: Bilateral;    REPLACEMENT OF CATHETER N/A 10/31/2019    Procedure: REPLACEMENT, CATHETER-SUPRAPUBIC;  Surgeon: Shireen Mayo MD;  Location: [facility] OR [address] [place] [address];  Service: Urology;  Laterality: N/A;    SPINAL CORD STIMULATOR REMOVAL      before [place]    SPINE SURGERY  5-13-13    CERVICAL FUSION    TONSILLECTOMY         Review of patient's allergies indicates:   Allergen Reactions    (d)-limonene flavor      Other reaction(s): difficult intubation  Other reaction(s): Difficulty breathing    Bactrim [sulfamethoxazole-trimethoprim] Anaphylaxis    Benadryl [diphenhydramine hcl] Shortness Of Breath    Fentanyl Itching, Nausea And Vomiting and Swelling             Imitrex [sumatriptan succinate] Shortness Of Breath    Percocet [oxycodone-acetaminophen] Shortness Of Breath    Topamax [topiramate] Shortness Of Breath    Vancomycin Anaphylaxis     Rash    Butorphanol tartrate     Darvocet a500 [propoxyphene n-acetaminophen]      Other reaction(s): Difficulty breathing    Evening primrose (oenothera biennis)     Lyrica [pregabalin] Other (See Comments)     tremors    White petrolatum-zinc     Zinc oxide-white petrolatum      Other reaction(s): Difficulty breathing    Latex, natural rubber Itching and Rash    Phenytoin Rash and Other (See Comments)     Trouble breathing       No current facility-administered medications on file prior to encounter.     Current Outpatient Medications on File Prior to Encounter   Medication Sig    aspirin (ECOTRIN) 81 MG EC tablet Take 1 tablet (81 mg total) by mouth once daily.    atorvastatin (LIPITOR) 80 MG tablet Take 1 tablet (80 mg total) by mouth once daily.    butalbital-acetaminophen-caffeine -40 mg (FIORICET, ESGIC) -40 mg per tablet Take 1 tablet by mouth daily as needed.    cyanocobalamin, vitamin B-12,  5,000 mcg Subl Place 1 tablet under the tongue once daily.    docusate sodium (COLACE) 100 MG capsule Take 200 mg by mouth every evening.    ferrous gluconate (FERGON) 324 MG tablet Take 1 tablet (324 mg total) by mouth daily with breakfast.    fludrocortisone (FLORINEF) 0.1 mg Tab Take a half-tablet (50 mcg) by mouth once daily    FLUoxetine 20 MG capsule Take 3 capsules (60 mg total) by mouth once daily.    fluticasone propionate (FLONASE) 50 mcg/actuation nasal spray 2 sprays (100 mcg total) by Each Nostril route once daily.    furosemide (LASIX) 40 MG tablet Take 1 tablet (40 mg total) by mouth 2 (two) times a day.    HYDROcodone-acetaminophen (NORCO)  mg per tablet Take 1 tablet by mouth every 6 (six) hours as needed for breakthrough pain.    hydrocortisone (CORTEF) 10 MG Tab Take 1 tablet (10 mg total) by mouth every evening.    intrathecal pain pump compound Hydromorphone (7.5 mg/mL) infusion at 8.59 mg/day (0.3578 mg/hr) out of a total reservoir volume of 40 mL  Pump filled monthly    ketorolac (TORADOL) 30 mg/mL (1 mL) injection Inject 30 mg into the vein once.    levothyroxine (SYNTHROID) 150 MCG tablet Take 1 tablet (150 mcg total) by mouth before breakfast.    LIDOcaine (LIDODERM) 5 % Place 1 patch onto the skin once daily. Remove & Discard patch within 12 hours or as directed by MD    liothyronine (CYTOMEL) 25 MCG Tab Take 1 tablet (25 mcg total) by mouth once daily.    nitroGLYCERIN (NITROSTAT) 0.4 MG SL tablet Place 0.4 mg under the tongue every 5 (five) minutes as needed for Chest pain.    ondansetron (ZOFRAN-ODT) 4 MG TbDL Take 4 mg by mouth every 4 to 6 hours as needed.    pantoprazole (PROTONIX) 40 MG tablet Take 1 tablet (40 mg total) by mouth once daily.    potassium chloride (MICRO-K) 10 MEQ CpSR Take 2 capsules (20 mEq total) by mouth once daily.    promethazine (PHENERGAN) 25 MG tablet Take 25 mg by mouth every 6 (six) hours as needed for Nausea.    QUEtiapine  "(SEROQUEL) 100 MG Tab TAKE 2 TABLETS (200 MG) BY MOUTH NIGHTLY (Patient taking differently: Take 200 mg by mouth nightly.)    senna (SENNA LAX) 8.6 mg tablet Take 2 tablets by mouth 2 (two) times daily.    tiotropium bromide (SPIRIVA RESPIMAT) 2.5 mcg/actuation inhaler Inhale 2 puffs into the lungs once daily. Controller    tiZANidine (ZANAFLEX) 4 MG tablet Take 4 mg by mouth 2 (two) times daily as needed.    traZODone (DESYREL) 300 MG tablet Take 1 tablet (300 mg total) by mouth every evening.    albuterol-ipratropium (DUO-NEB) 2.5 mg-0.5 mg/3 mL nebulizer solution Take 3 mLs by nebulization every 6 (six) hours as needed for Wheezing or Shortness of Breath. Rescue    hydrOXYzine (ATARAX) 50 MG tablet Take 1 tablet (50 mg total) by mouth every 4 (four) hours as needed for Anxiety.    ketorolac (TORADOL) 10 mg tablet Take 10 mg by mouth daily as needed.    mupirocin (BACTROBAN) 2 % ointment Apply topically 2 (two) times daily. Apply small amount to each nostril twice a day for 5 days as instructed. for 5 days     Family History       Problem Relation (Age of Onset)    Cancer Mother (55), Father    Cirrhosis Paternal Aunt, Paternal Uncle    Colon cancer Maternal Uncle    Esophageal cancer Father    Heart disease Maternal Uncle    Liver disease Paternal Aunt, Paternal Uncle    No Known Problems Brother, Brother, Sister, Maternal Aunt, Maternal Grandfather, Paternal Grandmother, Paternal Grandfather    Parkinsonism Maternal Grandmother    Tremor Maternal Grandmother          Tobacco Use    Smoking status: Never    Smokeless tobacco: Never   Substance and Sexual Activity    Alcohol use: Never    Drug use: Yes     Types: Marijuana     Comment: "medical marijuana gummies"    Sexual activity: Never     Partners: Male     Review of Systems   Constitutional:  Negative for chills, fatigue and fever.   HENT:  Positive for congestion. Negative for trouble swallowing.    Respiratory:  Positive for cough and shortness " of breath.    Cardiovascular:  Negative for chest pain.   Gastrointestinal:  Negative for abdominal pain, nausea and vomiting.   Genitourinary:         Suprapubic catheter   Musculoskeletal:  Positive for gait problem.   Skin:  Positive for wound (BLE lymphedema).   Neurological:  Positive for facial asymmetry (chronic s/p CVA). Negative for dizziness, syncope, weakness, light-headedness and headaches.   Psychiatric/Behavioral:  Negative for agitation and confusion. The patient is not nervous/anxious.    Objective:     Vital Signs (Most Recent):  Temp: 97 °F (36.1 °C) (07/08/23 2243)  Pulse: (!) 57 (07/08/23 2243)  Resp: 18 (07/08/23 2243)  BP: (!) 103/54 (07/08/23 2243)  SpO2: 98 % (07/09/23 0036) Vital Signs (24h Range):  Temp:  [97 °F (36.1 °C)-99.3 °F (37.4 °C)] 97 °F (36.1 °C)  Pulse:  [57-99] 57  Resp:  [18-24] 18  SpO2:  [97 %-100 %] 98 %  BP: (103-124)/(51-90) 103/54     Weight: 98.4 kg (216 lb 14.9 oz)  Body mass index is 37.24 kg/m².     Physical Exam  Vitals and nursing note reviewed.   Constitutional:       General: She is not in acute distress.     Appearance: She is ill-appearing.   HENT:      Head: Normocephalic.      Mouth/Throat:      Mouth: Mucous membranes are moist.   Eyes:      Pupils: Pupils are equal, round, and reactive to light.   Cardiovascular:      Rate and Rhythm: Regular rhythm. Bradycardia present.      Pulses:           Dorsalis pedis pulses are 1+ on the right side and 1+ on the left side.      Heart sounds: Normal heart sounds.   Pulmonary:      Effort: Pulmonary effort is normal. No respiratory distress.      Breath sounds: Rhonchi present. No wheezing or rales.   Abdominal:      General: Bowel sounds are normal. There is no distension.      Palpations: Abdomen is soft.      Tenderness: There is no abdominal tenderness. There is no guarding.      Hernia: No hernia is present.   Musculoskeletal:      Right lower leg: 3+ Pitting Edema present.      Left lower leg: 3+ Pitting Edema  present.   Skin:     General: Skin is warm.      Comments: BLE redness, tenderness, edema, and weeping   Neurological:      Mental Status: She is alert and oriented to person, place, and time.      Cranial Nerves: Facial asymmetry (chronic) present.      Motor: Weakness present.   Psychiatric:         Mood and Affect: Mood normal.         Behavior: Behavior normal.         Thought Content: Thought content normal.         Judgment: Judgment normal.            CRANIAL NERVES     CN III, IV, VI   Pupils are equal, round, and reactive to light.     Significant Labs: All pertinent labs within the past 24 hours have been reviewed.    Significant Imaging: I have reviewed all pertinent imaging results/findings within the past 24 hours.    Assessment/Plan:     * Congestive heart failure  Patient is identified as having Diastolic (HFpEF) heart failure that is Acute on chronic. CHF is currently uncontrolled due to Rales/crackles on pulmonary exam and Pulmonary edema/pleural effusion on CXR. Latest ECHO performed and demonstrates- Results for orders placed during the hospital encounter of 04/28/23    Echo Saline Bubble? Yes    Interpretation Summary  · There is no evidence of intracardiac shunting.  · The left ventricle is normal in size with normal systolic function.  · The estimated ejection fraction is 65%.  · Normal left ventricular diastolic function.  · Normal right ventricular size with normal right ventricular systolic function.  · The estimated PA systolic pressure is 42 mmHg.  · Normal central venous pressure (3 mmHg).  . Continue Furosemide and monitor clinical status closely. Monitor on telemetry. Patient is on CHF pathway.  Monitor strict Is&Os and daily weights.  Place on fluid restriction of 1.5 L. Cardiology has not been consulted. Continue to stress to patient importance of self efficacy and  on diet for CHF. Last BNP reviewed- and noted below   Recent Labs   Lab 07/08/23  1714   *    .    · Patient reports compliance with medications but unsure that she is compliant with diet  · Requesting Sean's while in the ED this admission  · May benefit from RD HF education      Hypokalemia  · K 3.1  · Replacement ordered  · Continue home 20 meq KCl qday  · Repeat K and Mg ordered  · Goal K 4   · Maintain telemetry      History of stroke with residual deficit  · Residual left sided weakness and facial droop   · Continue ASA and statin      Debility  · Maintain safety precautions  · Q2h turns  · HHC ordered after last discharge      Neurogenic bladder  · Suprapubic catheter      Coronary artery disease of native artery with stable angina pectoris  · No CP  · Continue ASA and statin      Chronic pain syndrome  · Dilaudid intrathecal pain pump in place  · Last filled per [place]  6/9 for 40 days  · Will continue home dose of norco for breakthrough pain      Sleep apnea  · Continue CPAP at home settings qHS        VTE Risk Mitigation (From admission, onward)         Ordered     IP VTE HIGH RISK PATIENT  Once         07/08/23 1919     Place sequential compression device  Until discontinued         07/08/23 1919                     On 07/09/2023, patient should be placed in hospital observation services under my care in collaboration with Nic Mistry MD.      Lisa Carrasco NP  Department of Hospital Medicine  [facility]

## 2023-07-09 NOTE — HPI
Tasha Hawley is a 67-year-old female with a history of COPD on 4-5 L, chronic bilateral lower extremity edema, CHF, chronic pain, chronic indwelling suprapubic [place]. She presented to the ED with 1 week of cough, congestion, shortness of breath. Patient is very familiar to the [facility] Medicine team. Reportedly her oxygen sat 70% was at home but improved to upper 90s on her regular oxygen regimen.  She denies fevers, chills, nausea, vomiting, or CP. She reports that she had difficulty with her CPAP last night which may have precipitated her SOB. She also has significant BLE lymphedema, which is chronic, but appears slightly worse than her previous admission in May. She reports that she has been getting her leg's wrapped weekly but wound care didn't come this week. She also reports compliance with her lasix. However, her diet compliance is in question. Will admit for diuresis.

## 2023-07-09 NOTE — PLAN OF CARE
Problem: Adult Inpatient Plan of Care  Goal: Plan of Care Review  Outcome: Ongoing, Progressing     Virtual Nurse:  Vital signs, labs, progress notes and care plan reviewed.

## 2023-07-09 NOTE — ASSESSMENT & PLAN NOTE
Appears to have resolved, patient is hemodynamically stable and asymptomatic  Patient has a history of bradycardia in the past

## 2023-07-09 NOTE — ASSESSMENT & PLAN NOTE
Patient is identified as having Diastolic (HFpEF) heart failure that is Acute on chronic. CHF is currently uncontrolled due to Rales/crackles on pulmonary exam and Pulmonary edema/pleural effusion on CXR. Latest ECHO performed and demonstrates- Results for orders placed during the hospital encounter of 04/28/23    Echo Saline Bubble? Yes    Interpretation Summary  · There is no evidence of intracardiac shunting.  · The left ventricle is normal in size with normal systolic function.  · The estimated ejection fraction is 65%.  · Normal left ventricular diastolic function.  · Normal right ventricular size with normal right ventricular systolic function.  · The estimated PA systolic pressure is 42 mmHg.  · Normal central venous pressure (3 mmHg).  . Continue Furosemide and monitor clinical status closely. Monitor on telemetry. Patient is on CHF pathway.  Monitor strict Is&Os and daily weights.  Place on fluid restriction of 1.5 L. Cardiology has not been consulted. Continue to stress to patient importance of self efficacy and  on diet for CHF. Last BNP reviewed- and noted below   Recent Labs   Lab 07/08/23  1714   *   .    · Patient reports compliance with medications but unsure that she is compliant with diet  · Requesting Sean's while in the ED this admission  · May benefit from RD HF education

## 2023-07-09 NOTE — PROGRESS NOTES
[facility]  Progress Note    Patient Name: Tasha Hawley  MRN: [id number]  Patient Class: OP- Observation   Admission Date: 7/8/2023  Length of Stay: 0 days  Attending Physician: Matt Duarte MD  Primary Care Provider: Mesfin Hodges Ii, MD        Subjective:     Principal Problem:Congestive heart failure        HPI:  Tasha Hawley is a 67-year-old female with a history of COPD on 4-5 L, chronic bilateral lower extremity edema, CHF, chronic pain, chronic indwelling suprapubic [place]. She presented to the ED with 1 week of cough, congestion, shortness of breath. Patient is very familiar to the [facility] Medicine team. Reportedly her oxygen sat 70% was at home but improved to upper 90s on her regular oxygen regimen.  She denies fevers, chills, nausea, vomiting, or CP. She reports that she had difficulty with her CPAP last night which may have precipitated her SOB. She also has significant BLE lymphedema, which is chronic, but appears slightly worse than her previous admission in May. She reports that she has been getting her leg's wrapped weekly but wound care didn't come this week. She also reports compliance with her lasix. However, her diet compliance is in question. Will admit for diuresis.      Overview/Hospital Course:  No notes on file    Interval History:   This morning patient complains of chronic back pain and is requesting IV Dilaudid  She otherwise has no complaints   Denies chest pain or dyspnea    Review of Systems  Objective:     Vital Signs (Most Recent):  Temp: 97 °F (36.1 °C) (07/09/23 1221)  Pulse: 61 (07/09/23 1221)  Resp: 20 (07/09/23 1221)  BP: (!) 95/56 (07/09/23 1221)  SpO2: 98 % (07/09/23 1221) Vital Signs (24h Range):  Temp:  [96.6 °F (35.9 °C)-99.3 °F (37.4 °C)] 97 °F (36.1 °C)  Pulse:  [53-99] 61  Resp:  [16-24] 20  SpO2:  [96 %-100 %] 98 %  BP: ()/(51-90) 95/56     Weight: 98.4 kg (216 lb 14.9 oz)  Body mass index is 37.24  kg/m².    Intake/Output Summary (Last 24 hours) at 7/9/2023 1443  Last data filed at 7/9/2023 1311  Gross per 24 hour   Intake --   Output 3605 ml   Net -3605 ml         Physical Exam  Constitutional:       Comments: Chronically ill-appearing   HENT:      Mouth/Throat:      Mouth: Mucous membranes are moist.      Pharynx: Oropharynx is clear.   Eyes:      Extraocular Movements: Extraocular movements intact.      Conjunctiva/sclera: Conjunctivae normal.      Pupils: Pupils are equal, round, and reactive to light.   Cardiovascular:      Rate and Rhythm: Regular rhythm. Bradycardia present.      Heart sounds: Murmur heard.   Pulmonary:      Effort: Pulmonary effort is normal.      Breath sounds: Normal breath sounds.   Abdominal:      General: Bowel sounds are normal.      Palpations: Abdomen is soft.   Musculoskeletal:         General: Normal range of motion.      Right lower leg: Edema present.      Left lower leg: Edema present.   Skin:     General: Skin is warm and dry.   Neurological:      General: No focal deficit present.      Mental Status: She is alert and oriented to person, place, and time.   Psychiatric:         Mood and Affect: Mood normal.         Behavior: Behavior normal.           Significant Labs: All pertinent labs within the past 24 hours have been reviewed.    Significant Imaging: I have reviewed all pertinent imaging results/findings within the past 24 hours.      Assessment/Plan:      * Congestive heart failure  Patient is identified as having Diastolic (HFpEF) heart failure that is Acute on chronic. CHF is currently uncontrolled due to Rales/crackles on pulmonary exam and Pulmonary edema/pleural effusion on CXR. Latest ECHO performed and demonstrates- Results for orders placed during the hospital encounter of 04/28/23    Echo Saline Bubble? Yes    Interpretation Summary  · There is no evidence of intracardiac shunting.  · The left ventricle is normal in size with normal systolic function.  · The  estimated ejection fraction is 65%.  · Normal left ventricular diastolic function.  · Normal right ventricular size with normal right ventricular systolic function.  · The estimated PA systolic pressure is 42 mmHg.  · Normal central venous pressure (3 mmHg).  . Continue Furosemide and monitor clinical status closely. Monitor on telemetry. Patient is on CHF pathway.  Monitor strict Is&Os and daily weights.  Place on fluid restriction of 1.5 L. Cardiology has not been consulted. Continue to stress to patient importance of self efficacy and  on diet for CHF. Last BNP reviewed- and noted below   Recent Labs   Lab 07/08/23 1714   *   .    Patient was diuresed-2.2 L so far  Continue Lasix      Hypokalemia  · K 3.1  · Replacement ordered  · Continue home 20 meq KCl qday  · Repeat K and Mg ordered  · Goal K 4   · Maintain telemetry      History of stroke with residual deficit  · Residual left sided weakness and facial droop   · Continue ASA and statin      Debility  · Maintain safety precautions  · Q2h turns  · HHC ordered after last discharge    PT OT to evaluate      Bradycardia  Appears to have resolved, patient is hemodynamically stable and asymptomatic  Patient has a history of bradycardia in the past      Neurogenic bladder  · Suprapubic catheter      Coronary artery disease of native artery with stable angina pectoris  · No CP  · Continue ASA and statin      Chronic pain syndrome  · Dilaudid intrathecal pain pump in place  · Last filled per [place]  6/9 for 40 days  · Will continue home dose of norco for breakthrough pain  · Avoiding IV narcotics      Sleep apnea  · Continue CPAP at home settings qHS        VTE Risk Mitigation (From admission, onward)         Ordered     enoxaparin injection 40 mg  Every 12 hours         07/09/23 0124     IP VTE HIGH RISK PATIENT  Once         07/08/23 1919     Place sequential compression device  Until discontinued         07/08/23 1919                Discharge Planning    JACKIE:      Code Status: Full Code   Is the patient medically ready for discharge?:     Reason for patient still in hospital (select all that apply): Patient trending condition, Laboratory test, Treatment and PT / OT recommendations                     Matt Duarte MD  Department of [facility]

## 2023-07-09 NOTE — NURSING
RAPID RESPONSE NURSE PROACTIVE ROUNDING NOTE       Time of Visit: 1025    ASSESSMENT/INTERVENTIONS    PIV inserted to Right Forearm with ultrasound guidance. Good blood return noted. Bedside Nurse, Crest notified.     Call back the Rapid Response NurseSarah at [phone] for additional questions or concerns.

## 2023-07-09 NOTE — ASSESSMENT & PLAN NOTE
· K 3.1  · Replacement ordered  · Continue home 20 meq KCl qday  · Repeat K and Mg ordered  · Goal K 4   · Maintain telemetry

## 2023-07-09 NOTE — ASSESSMENT & PLAN NOTE
· Dilaudid intrathecal pain pump in place  · Last filled per [facility]  6/9 for 40 days  · Will continue home dose of norco for breakthrough pain

## 2023-07-09 NOTE — ASSESSMENT & PLAN NOTE
· Dilaudid intrathecal pain pump in place  · Last filled per [facility]  6/9 for 40 days  · Will continue home dose of norco for breakthrough pain  · Avoiding IV narcotics

## 2023-07-09 NOTE — PT/OT/SLP EVAL
Occupational Therapy   Evaluation    Name: Tasha Hawley  MRN: [id number]  Admitting Diagnosis: Congestive heart failure  Recent Surgery: * No surgery found *      Recommendations:     Discharge Recommendations: other (see comments) (low intensity therapy)  Discharge Equipment Recommendations:  hospital bed, bath bench  Barriers to discharge:  None    Assessment:     Tasha Hawley is a 67 y.o. female with a medical diagnosis of Congestive heart failure.  She presents with the following performance deficits affecting function: weakness, impaired endurance, impaired self care skills, impaired functional mobility, gait instability, impaired balance, decreased lower extremity function, decreased safety awareness, impaired coordination, impaired skin, edema, impaired cardiopulmonary response to activity.  Pt was agreeable to and participated in OT/PT evaluation.  Pt reports that she was mod I with mobility and required assistance with LB ADLS at [facility].  During today's session, pt required SBA-CGA with func mobility and set-up to max A with ADLS.  Pt limited on evaluation due to low BP following toileting (BP range 86-95/48-57). Goals established to assist pt with returning to [facility] regarding ADLs and func mobility.  Pt will benefit from skilled OT services in order to increase her level of safety and independence with ADLs and mobility.      Rehab Prognosis: Good; patient would benefit from acute skilled OT services to address these deficits and reach maximum level of function.       Plan:     Patient to be seen   to address the above listed problems via self-care/home management, therapeutic activities, therapeutic exercises  Plan of Care Expires: 08/08/23  Plan of Care Reviewed with: patient    Subjective     Chief Complaint: fatigue  Patient/Family Comments/goals: return home at [facility]    Occupational Profile:  Living Environment: pt lives with her  in a [facility] with ramp access - t/s combo for bathing  Previous  level of function: mod I with mobility - mod I with upper body ADLS - needed A with LB/feet (dressing and bathing)  Roles and Routines: wife  Equipment Used at Home: CPAP, bath bench, oxygen, rollator, bedside commode, wheelchair (current TTB in poor condition)  Assistance upon Discharge: assist from     Pain/Comfort:  Pain Rating 1: 9/10  Location 1: back  Pain Addressed 1: Reposition, Distraction, Cessation of Activity, Nurse notified  Pain Rating Post-Intervention 1: 9/10    Patients cultural, spiritual, Latter day conflicts given the current situation: no    Objective:     Communicated with: nurse prior to session.  Patient found  in bathroom (toileting)  with oxygen (suprapubic catheter) upon OT entry to room.    General Precautions: Standard,    Orthopedic Precautions: N/A  Braces: N/A  Respiratory Status: Nasal cannula, flow 4 L/min  (uses 5L at home)    Occupational Performance:    Bed Mobility:    Patient completed Scooting/Bridging with stand by assistance  Patient completed Sit to Supine with stand by assistance    Functional Mobility/Transfers:  Patient completed Sit <> Stand Transfer with stand by assistance  with  no assistive device   Patient completed Toilet Transfer Step Transfer technique with stand by assistance with  grab bars  Functional Mobility: Pt walked from bathroom to bed with CGA assistance - noted with significant forward lean with ambulation    Activities of Daily Living:  Feeding:  independence    Grooming: set up A while seated  Upper Body Dressing: set up A with hospital gown  Lower Body Dressing: total assistance for socks  Toileting: stand by assistance completed all steps    Cognitive/Visual Perceptual:  Cognitive/Psychosocial Skills:     -       Oriented to: Person, Place, Time, and Situation   -       Follows Commands/attention:Follows two-step commands  -       Communication: clear/fluent  -       Memory: No Deficits noted  -       Safety awareness/insight to disability:  impaired   -       Mood/Affect/Coping skills/emotional control: Appropriate to situation    Physical Exam:  Balance: -       sit = good:  stand = required CGA due to forward lean and no AD  Postural examination/scapula alignment:    -       Rounded shoulders  -       Forward head  -       Posterior pelvic tilt  Skin integrity: discoloration on LEs  Edema:  Severe on B LEs  Sensation: -       Impaired  light/touch on feet  Upper Extremity Range of Motion:   BUEs = WFL for ADLS  Upper Extremity Strength:  BUEs = WFL for ADLS   Strength:  BUEs = WFL for ADLS    AMPAC 6 Click ADL:  AMPAC Total Score: 18    Treatment & Education:  Pt completed ADLs and func mobility activities for tx session as noted above  Pt educated on role of OT and POC      Patient left HOB elevated with all lines intact and call button in reach    GOALS:   Multidisciplinary Problems       Occupational Therapy Goals          Problem: Occupational Therapy    Goal Priority Disciplines Outcome Interventions   Occupational Therapy Goal     OT, PT/OT Ongoing, Progressing    Description: Goals to be met by: 08/09/23     Patient will increase functional independence with ADLs by performing:    UE Dressing with Modified [place].  Grooming while standing at sink with Modified [place].  Toileting from toilet with Modified [place] for hygiene and clothing management.   Toilet transfer to toilet with Modified [place].  Upper extremity exercise program 3x15 reps per handout, with supervision.                         History:     Past Medical History:   Diagnosis Date    Anticoagulant long-term use     Anxiety     Arthritis     Bilateral lower extremity edema     severe chronic    Carotid artery occlusion     Cataract     CHF (congestive heart failure)     Coronary artery disease     subtotalled LAD with collateral    Depression     Fever blister     Hard of hearing     Hypokalemia 1/9/2023    Hyponatremia 2/4/2022    Hypothyroid     Iron  deficiency anemia     Lumbar radiculopathy     with chronic pain    Ocular migraine     Other emphysema 5/22/2023    Renal disorder     Sleep apnea     cpap         Past Surgical History:   Procedure Laterality Date    ADENOIDECTOMY      BACK SURGERY      x 12    CARDIAC CATHETERIZATION  2016    subtotalled LAD with right to left collaterals    CATARACT EXTRACTION W/  INTRAOCULAR LENS IMPLANT Left     Dr Coleman     CYSTOSCOPIC LITHOLAPAXY N/A 6/27/2019    Procedure: CYSTOLITHOLAPAXY;  Surgeon: Shireen Mayo MD;  Location: [facility] OR 2ND FLR;  Service: Urology;  Laterality: N/A;    CYSTOSCOPIC LITHOLAPAXY N/A 9/3/2019    Procedure: CYSTOLITHOLAPAXY;  Surgeon: Shireen Mayo MD;  Location: [facility] OR 2ND FLR;  Service: Urology;  Laterality: N/A;    CYSTOSCOPY N/A 7/13/2021    Procedure: CYSTOSCOPY;  Surgeon: Shireen Mayo MD;  Location: [facility] OR 1ST FLR;  Service: Urology;  Laterality: N/A;    CYSTOSCOPY  11/16/2021    Procedure: CYSTOSCOPY;  Surgeon: Shireen Mayo MD;  Location: [facility] OR 1ST FLR;  Service: Urology;;    CYSTOSCOPY  7/19/2022    Procedure: CYSTOSCOPY;  Surgeon: Shireen Mayo MD;  Location: [facility] OR 1ST FLR;  Service: Urology;;    CYSTOSCOPY WITH INJECTION OF PERIURETHRAL BULKING AGENT  7/19/2022    Procedure: CYSTOSCOPY, WITH PERIURETHRAL BULKING AGENT INJECTION-MACROPLASTIQUE;  Surgeon: Shireen Mayo MD;  Location: [facility] OR 1ST FLR;  Service: Urology;;    CYSTOSCOPY WITH INJECTION OF PERIURETHRAL BULKING AGENT N/A 3/28/2023    Procedure: CYSTOSCOPY, WITH PERIURETHRAL BULKING AGENT INJECTION;  Surgeon: Shireen Mayo MD;  Location: [facility] OR 1ST FLR;  Service: Urology;  Laterality: N/A;  [place]    CYSTOSCOPY,WITH BOTULINUM TOXIN INJECTION N/A 12/13/2022    Procedure: CYSTOSCOPY,WITH BOTULINUM TOXIN INJECTION;  Surgeon: Shireen Mayo MD;  Location: [facility] OR 1ST FLR;  Service: Urology;  Laterality: N/A;  300 U    CYSTOSCOPY,WITH BOTULINUM TOXIN INJECTION N/A 3/28/2023    Procedure:  CYSTOSCOPY,WITH BOTULINUM TOXIN INJECTION;  Surgeon: Shireen Mayo MD;  [facility];  Service: Urology;  Laterality: N/A;  45 MIN.    300 UNITS    ESOPHAGOGASTRODUODENOSCOPY N/A 5/23/2018    Procedure: ESOPHAGOGASTRODUODENOSCOPY (EGD);  Surgeon: Prince Vance MD;  Location: [facility] ENDO (4TH FLR);  Service: Endoscopy;  Laterality: N/A;  r/s 'd per Dr. Vance due to family emergency- ER    ESOPHAGOGASTRODUODENOSCOPY N/A 6/23/2023    Procedure: EGD (ESOPHAGOGASTRODUODENOSCOPY);  Surgeon: Juaquin Barry MD;  Location: [facility] ENDO (2ND FLR);  Service: Endoscopy;  Laterality: N/A;  Refferal: PRINCE VANCE  Order  tele enc 5/18/23  dx: history of a Nissen fundoplication  Additional Scheduling Information:  On home oxygen 2nd floor for airway protection also with a pain pump elevated BMI close to 40   Prep Gastroparesis   ins    HYSTERECTOMY  1975    endometriosis    INJECTION OF BOTULINUM TOXIN TYPE A  7/13/2021    Procedure: INJECTION, BOTULINUM TOXIN, 200units;  Surgeon: Shireen Mayo MD;  Location: [facility] OR [address] [place] [address];  Service: Urology;;    INJECTION OF BOTULINUM TOXIN TYPE A  11/16/2021    Procedure: INJECTION, BOTULINUM TOXIN, 200units;  Surgeon: Shireen Mayo MD;  Location: [facility] OR [address] [place] [address];  Service: Urology;;    INJECTION OF BOTULINUM TOXIN TYPE A  7/19/2022    Procedure: INJECTION, BOTULINUM TOXIN, 300 units ;  Surgeon: Shireen Mayo MD;  [facility];  Service: Urology;;    INSERTION, SUPRAPUBIC CATHETER N/A 12/13/2022    Procedure: INSERTION, SUPRAPUBIC CATHETER;  Surgeon: Shireen Mayo MD;  [facility];  Service: Urology;  Laterality: N/A;  exchange    pain pump placement      SQ Dilaudid Pump managed by Dr. Hillman, Pain Management    REMOVAL OF BONE SPUR OF FOOT Bilateral 9/16/2022    Procedure: EXCISION ARTHRITIC BONE, BILATERAL FOOT;  Surgeon: Adam Mcguire DPM;  Location: [facility];  Service: Podiatry;  Laterality: Bilateral;     REPLACEMENT OF CATHETER N/A 10/31/2019    Procedure: REPLACEMENT, CATHETER-SUPRAPUBIC;  Surgeon: Shireen Mayo MD;  Location: [facility] OR [address] [place] [address];  Service: Urology;  Laterality: N/A;    SPINAL CORD STIMULATOR REMOVAL      before [place]    SPINE SURGERY  5-13-13    CERVICAL FUSION    TONSILLECTOMY         Time Tracking:     OT Date of Treatment: 07/09/23  OT Start Time: 1201  OT Stop Time: 1228  OT Total Time (min): 27 min    Billable Minutes:Evaluation 10  Self Care/Home Management 8    7/9/2023

## 2023-07-09 NOTE — ED NOTES
Contacted pharmacy regarding pain pump compound. Per admit physician, pts 40-day supply of medication for internal pain pump refilled one month ago. Pain medication released from internal pain pump automatically at pre-determined set rate. Pt takes pain pills PRN. Requesting pill at this time.

## 2023-07-09 NOTE — ED NOTES
"Pt c/o pain. RN stated PRN pain pills are ordered. Pt asked if Dilaudid was ordered, stating she wants "a shot" and not a pill. Secure chat sent to physician. Per NP Lisa Carrasco, dilaudid will not be ordered r/t indwelling pain pump.  "

## 2023-07-09 NOTE — ASSESSMENT & PLAN NOTE
· Maintain safety precautions  · Q2h turns  · HHC ordered after last discharge    PT OT to evaluate

## 2023-07-09 NOTE — SUBJECTIVE & OBJECTIVE
Past Medical History:   Diagnosis Date    Anticoagulant long-term use     Anxiety     Arthritis     Bilateral lower extremity edema     severe chronic    Carotid artery occlusion     Cataract     CHF (congestive heart failure)     Coronary artery disease     subtotalled LAD with collateral    Depression     Fever blister     Hard of hearing     Hypokalemia 1/9/2023    Hyponatremia 2/4/2022    Hypothyroid     Iron deficiency anemia     Lumbar radiculopathy     with chronic pain    Ocular migraine     Other emphysema 5/22/2023    Renal disorder     Sleep apnea     cpap       Past Surgical History:   Procedure Laterality Date    ADENOIDECTOMY      BACK SURGERY      x 12    CARDIAC CATHETERIZATION  2016    subtotalled LAD with right to left collaterals    CATARACT EXTRACTION W/  INTRAOCULAR LENS IMPLANT Left     Dr Coleman     CYSTOSCOPIC LITHOLAPAXY N/A 6/27/2019    Procedure: CYSTOLITHOLAPAXY;  Surgeon: Shireen Mayo MD;  Location: [facility] OR 2ND FLR;  Service: Urology;  Laterality: N/A;    CYSTOSCOPIC LITHOLAPAXY N/A 9/3/2019    Procedure: CYSTOLITHOLAPAXY;  Surgeon: Shireen Mayo MD;  Location: [facility] OR 2ND FLR;  Service: Urology;  Laterality: N/A;    CYSTOSCOPY N/A 7/13/2021    Procedure: CYSTOSCOPY;  Surgeon: Shireen Mayo MD;  Location: [facility] OR 1ST FLR;  Service: Urology;  Laterality: N/A;    CYSTOSCOPY  11/16/2021    Procedure: CYSTOSCOPY;  Surgeon: Shireen Mayo MD;  [facility];  Service: Urology;;    CYSTOSCOPY  7/19/2022    Procedure: CYSTOSCOPY;  Surgeon: Shireen Mayo MD;  [facility];  Service: Urology;;    CYSTOSCOPY WITH INJECTION OF PERIURETHRAL BULKING AGENT  7/19/2022    Procedure: CYSTOSCOPY, WITH PERIURETHRAL BULKING AGENT INJECTION-MACROPLASTIQUE;  Surgeon: Shireen Mayo MD;  [facility];  Service: Urology;;    CYSTOSCOPY WITH INJECTION OF PERIURETHRAL BULKING AGENT N/A 3/28/2023    Procedure: CYSTOSCOPY, WITH PERIURETHRAL BULKING AGENT  INJECTION;  Surgeon: Shireen Mayo MD;  [facility];  Service: Urology;  Laterality: N/A;  [place]    CYSTOSCOPY,WITH BOTULINUM TOXIN INJECTION N/A 12/13/2022    Procedure: CYSTOSCOPY,WITH BOTULINUM TOXIN INJECTION;  Surgeon: Shireen Mayo MD;  [facility];  Service: Urology;  Laterality: N/A;  300 U    CYSTOSCOPY,WITH BOTULINUM TOXIN INJECTION N/A 3/28/2023    Procedure: CYSTOSCOPY,WITH BOTULINUM TOXIN INJECTION;  Surgeon: Shireen Mayo MD;  Location: [facility] OR 1ST FLR;  Service: Urology;  Laterality: N/A;  45 MIN.    300 UNITS    ESOPHAGOGASTRODUODENOSCOPY N/A 5/23/2018    Procedure: ESOPHAGOGASTRODUODENOSCOPY (EGD);  Surgeon: Prince Vance MD;  Location: [facility] ENDO (4TH FLR);  Service: Endoscopy;  Laterality: N/A;  r/s 'd per Dr. Vance due to family emergency- ER    ESOPHAGOGASTRODUODENOSCOPY N/A 6/23/2023    Procedure: EGD (ESOPHAGOGASTRODUODENOSCOPY);  Surgeon: Juaquin Barry MD;  Location: [facility] ENDO (2ND FLR);  Service: Endoscopy;  Laterality: N/A;  Refferal: PRINCE VANCE  Order  tele enc 5/18/23  dx: history of a Nissen fundoplication  Additional Scheduling Information:  On home oxygen 2nd floor for airway protection also with a pain pump elevated BMI close to 40   Prep Gastroparesis   ins    HYSTERECTOMY  1975    endometriosis    INJECTION OF BOTULINUM TOXIN TYPE A  7/13/2021    Procedure: INJECTION, BOTULINUM TOXIN, 200units;  Surgeon: Shireen Mayo MD;  [facility];  Service: Urology;;    INJECTION OF BOTULINUM TOXIN TYPE A  11/16/2021    Procedure: INJECTION, BOTULINUM TOXIN, 200units;  Surgeon: Shireen Mayo MD;  [facility];  Service: Urology;;    INJECTION OF BOTULINUM TOXIN TYPE A  7/19/2022    Procedure: INJECTION, BOTULINUM TOXIN, 300 units ;  Surgeon: Shireen Mayo MD;  [facility];  Service: Urology;;    INSERTION, SUPRAPUBIC CATHETER N/A 12/13/2022    Procedure: INSERTION, SUPRAPUBIC  CATHETER;  Surgeon: Shireen Mayo MD;  [facility];  Service: Urology;  Laterality: N/A;  exchange    pain pump placement      SQ Dilaudid Pump managed by Dr. Hillman, Pain Management    REMOVAL OF BONE SPUR OF FOOT Bilateral 9/16/2022    Procedure: EXCISION ARTHRITIC BONE, BILATERAL FOOT;  Surgeon: NICOLA Mcgrath;  Location: [facility] OR;  Service: Podiatry;  Laterality: Bilateral;    REPLACEMENT OF CATHETER N/A 10/31/2019    Procedure: REPLACEMENT, CATHETER-SUPRAPUBIC;  Surgeon: Shireen Mayo MD;  Location: [facility] OR [address] [place] [address];  Service: Urology;  Laterality: N/A;    SPINAL CORD STIMULATOR REMOVAL      before [place]    SPINE SURGERY  5-13-13    CERVICAL FUSION    TONSILLECTOMY         Review of patient's allergies indicates:   Allergen Reactions    (d)-limonene flavor      Other reaction(s): difficult intubation  Other reaction(s): Difficulty breathing    Bactrim [sulfamethoxazole-trimethoprim] Anaphylaxis    Benadryl [diphenhydramine hcl] Shortness Of Breath    Fentanyl Itching, Nausea And Vomiting and Swelling             Imitrex [sumatriptan succinate] Shortness Of Breath    Percocet [oxycodone-acetaminophen] Shortness Of Breath    Topamax [topiramate] Shortness Of Breath    Vancomycin Anaphylaxis     Rash    Butorphanol tartrate     Darvocet a500 [propoxyphene n-acetaminophen]      Other reaction(s): Difficulty breathing    Evening primrose (oenothera biennis)     Lyrica [pregabalin] Other (See Comments)     tremors    White petrolatum-zinc     Zinc oxide-white petrolatum      Other reaction(s): Difficulty breathing    Latex, natural rubber Itching and Rash    Phenytoin Rash and Other (See Comments)     Trouble breathing       No current facility-administered medications on file prior to encounter.     Current Outpatient Medications on File Prior to Encounter   Medication Sig    aspirin (ECOTRIN) 81 MG EC tablet Take 1 tablet (81 mg total) by mouth once daily.    atorvastatin  (LIPITOR) 80 MG tablet Take 1 tablet (80 mg total) by mouth once daily.    butalbital-acetaminophen-caffeine -40 mg (FIORICET, ESGIC) -40 mg per tablet Take 1 tablet by mouth daily as needed.    cyanocobalamin, vitamin B-12, 5,000 mcg Subl Place 1 tablet under the tongue once daily.    docusate sodium (COLACE) 100 MG capsule Take 200 mg by mouth every evening.    ferrous gluconate (FERGON) 324 MG tablet Take 1 tablet (324 mg total) by mouth daily with breakfast.    fludrocortisone (FLORINEF) 0.1 mg Tab Take a half-tablet (50 mcg) by mouth once daily    FLUoxetine 20 MG capsule Take 3 capsules (60 mg total) by mouth once daily.    fluticasone propionate (FLONASE) 50 mcg/actuation nasal spray 2 sprays (100 mcg total) by Each Nostril route once daily.    furosemide (LASIX) 40 MG tablet Take 1 tablet (40 mg total) by mouth 2 (two) times a day.    HYDROcodone-acetaminophen (NORCO)  mg per tablet Take 1 tablet by mouth every 6 (six) hours as needed for breakthrough pain.    hydrocortisone (CORTEF) 10 MG Tab Take 1 tablet (10 mg total) by mouth every evening.    intrathecal pain pump compound Hydromorphone (7.5 mg/mL) infusion at 8.59 mg/day (0.3578 mg/hr) out of a total reservoir volume of 40 mL  Pump filled monthly    ketorolac (TORADOL) 30 mg/mL (1 mL) injection Inject 30 mg into the vein once.    levothyroxine (SYNTHROID) 150 MCG tablet Take 1 tablet (150 mcg total) by mouth before breakfast.    LIDOcaine (LIDODERM) 5 % Place 1 patch onto the skin once daily. Remove & Discard patch within 12 hours or as directed by MD    liothyronine (CYTOMEL) 25 MCG Tab Take 1 tablet (25 mcg total) by mouth once daily.    nitroGLYCERIN (NITROSTAT) 0.4 MG SL tablet Place 0.4 mg under the tongue every 5 (five) minutes as needed for Chest pain.    ondansetron (ZOFRAN-ODT) 4 MG TbDL Take 4 mg by mouth every 4 to 6 hours as needed.    pantoprazole (PROTONIX) 40 MG tablet Take 1 tablet (40 mg total) by mouth once daily.     "potassium chloride (MICRO-K) 10 MEQ CpSR Take 2 capsules (20 mEq total) by mouth once daily.    promethazine (PHENERGAN) 25 MG tablet Take 25 mg by mouth every 6 (six) hours as needed for Nausea.    QUEtiapine (SEROQUEL) 100 MG Tab TAKE 2 TABLETS (200 MG) BY MOUTH NIGHTLY (Patient taking differently: Take 200 mg by mouth nightly.)    senna (SENNA LAX) 8.6 mg tablet Take 2 tablets by mouth 2 (two) times daily.    tiotropium bromide (SPIRIVA RESPIMAT) 2.5 mcg/actuation inhaler Inhale 2 puffs into the lungs once daily. Controller    tiZANidine (ZANAFLEX) 4 MG tablet Take 4 mg by mouth 2 (two) times daily as needed.    traZODone (DESYREL) 300 MG tablet Take 1 tablet (300 mg total) by mouth every evening.    albuterol-ipratropium (DUO-NEB) 2.5 mg-0.5 mg/3 mL nebulizer solution Take 3 mLs by nebulization every 6 (six) hours as needed for Wheezing or Shortness of Breath. Rescue    hydrOXYzine (ATARAX) 50 MG tablet Take 1 tablet (50 mg total) by mouth every 4 (four) hours as needed for Anxiety.    ketorolac (TORADOL) 10 mg tablet Take 10 mg by mouth daily as needed.    mupirocin (BACTROBAN) 2 % ointment Apply topically 2 (two) times daily. Apply small amount to each nostril twice a day for 5 days as instructed. for 5 days     Family History       Problem Relation (Age of Onset)    Cancer Mother (55), Father    Cirrhosis Paternal Aunt, Paternal Uncle    Colon cancer Maternal Uncle    Esophageal cancer Father    Heart disease Maternal Uncle    Liver disease Paternal Aunt, Paternal Uncle    No Known Problems Brother, Brother, Sister, Maternal Aunt, Maternal Grandfather, Paternal Grandmother, Paternal Grandfather    Parkinsonism Maternal Grandmother    Tremor Maternal Grandmother          Tobacco Use    Smoking status: Never    Smokeless tobacco: Never   Substance and Sexual Activity    Alcohol use: Never    Drug use: Yes     Types: Marijuana     Comment: "medical marijuana gummies"    Sexual activity: Never     Partners: Male "     Review of Systems   Constitutional:  Negative for chills, fatigue and fever.   HENT:  Positive for congestion. Negative for trouble swallowing.    Respiratory:  Positive for cough and shortness of breath.    Cardiovascular:  Negative for chest pain.   Gastrointestinal:  Negative for abdominal pain, nausea and vomiting.   Genitourinary:         Suprapubic catheter   Musculoskeletal:  Positive for gait problem.   Skin:  Positive for wound (BLE lymphedema).   Neurological:  Positive for facial asymmetry (chronic s/p CVA). Negative for dizziness, syncope, weakness, light-headedness and headaches.   Psychiatric/Behavioral:  Negative for agitation and confusion. The patient is not nervous/anxious.    Objective:     Vital Signs (Most Recent):  Temp: 97 °F (36.1 °C) (07/08/23 2243)  Pulse: (!) 57 (07/08/23 2243)  Resp: 18 (07/08/23 2243)  BP: (!) 103/54 (07/08/23 2243)  SpO2: 98 % (07/09/23 0036) Vital Signs (24h Range):  Temp:  [97 °F (36.1 °C)-99.3 °F (37.4 °C)] 97 °F (36.1 °C)  Pulse:  [57-99] 57  Resp:  [18-24] 18  SpO2:  [97 %-100 %] 98 %  BP: (103-124)/(51-90) 103/54     Weight: 98.4 kg (216 lb 14.9 oz)  Body mass index is 37.24 kg/m².     Physical Exam  Vitals and nursing note reviewed.   Constitutional:       General: She is not in acute distress.     Appearance: She is ill-appearing.   HENT:      Head: Normocephalic.      Mouth/Throat:      Mouth: Mucous membranes are moist.   Eyes:      Pupils: Pupils are equal, round, and reactive to light.   Cardiovascular:      Rate and Rhythm: Regular rhythm. Bradycardia present.      Pulses:           Dorsalis pedis pulses are 1+ on the right side and 1+ on the left side.      Heart sounds: Normal heart sounds.   Pulmonary:      Effort: Pulmonary effort is normal. No respiratory distress.      Breath sounds: Rhonchi present. No wheezing or rales.   Abdominal:      General: Bowel sounds are normal. There is no distension.      Palpations: Abdomen is soft.      Tenderness:  There is no abdominal tenderness. There is no guarding.      Hernia: No hernia is present.   Musculoskeletal:      Right lower leg: 3+ Pitting Edema present.      Left lower leg: 3+ Pitting Edema present.   Skin:     General: Skin is warm.      Comments: BLE redness, tenderness, edema, and weeping   Neurological:      Mental Status: She is alert and oriented to person, place, and time.      Cranial Nerves: Facial asymmetry (chronic) present.      Motor: Weakness present.   Psychiatric:         Mood and Affect: Mood normal.         Behavior: Behavior normal.         Thought Content: Thought content normal.         Judgment: Judgment normal.            CRANIAL NERVES     CN III, IV, VI   Pupils are equal, round, and reactive to light.     Significant Labs: All pertinent labs within the past 24 hours have been reviewed.    Significant Imaging: I have reviewed all pertinent imaging results/findings within the past 24 hours.

## 2023-07-09 NOTE — ASSESSMENT & PLAN NOTE
Patient is identified as having Diastolic (HFpEF) heart failure that is Acute on chronic. CHF is currently uncontrolled due to Rales/crackles on pulmonary exam and Pulmonary edema/pleural effusion on CXR. Latest ECHO performed and demonstrates- Results for orders placed during the hospital encounter of 04/28/23    Echo Saline Bubble? Yes    Interpretation Summary  · There is no evidence of intracardiac shunting.  · The left ventricle is normal in size with normal systolic function.  · The estimated ejection fraction is 65%.  · Normal left ventricular diastolic function.  · Normal right ventricular size with normal right ventricular systolic function.  · The estimated PA systolic pressure is 42 mmHg.  · Normal central venous pressure (3 mmHg).  . Continue Furosemide and monitor clinical status closely. Monitor on telemetry. Patient is on CHF pathway.  Monitor strict Is&Os and daily weights.  Place on fluid restriction of 1.5 L. Cardiology has not been consulted. Continue to stress to patient importance of self efficacy and  on diet for CHF. Last BNP reviewed- and noted below   Recent Labs   Lab 07/08/23  1714   *   .    Patient was diuresed-2.2 L so far  Continue Lasix

## 2023-07-09 NOTE — PLAN OF CARE
Problem: Occupational Therapy  Goal: Occupational Therapy Goal  Description: Goals to be met by: 08/09/23     Patient will increase functional independence with ADLs by performing:    UE Dressing with Modified [place].  Grooming while standing at sink with Modified [place].  Toileting from toilet with Modified [place] for hygiene and clothing management.   Toilet transfer to toilet with Modified [place].  Upper extremity exercise program 3x15 reps per handout, with supervision.    Outcome: Ongoing, Progressing     Pt was agreeable to and participated in OT/PT evaluation.  Pt reports that she was mod I with mobility and required assistance with LB ADLS at OF.  During today's session, pt required SBA-CGA with func mobility and set-up to max A with ADLS.  Pt limited on evaluation due to low BP following toileting (BP range 86-95/48-57). Goals established to assist pt with returning to OF regarding ADLs and func mobility.  Pt will benefit from skilled OT services in order to increase her level of safety and independence with ADLs and mobility.      Lisa Cartwright, OT  7/9/2023

## 2023-07-09 NOTE — PT/OT/SLP EVAL
Physical Therapy Evaluation    Patient Name:  Tasha Hawley   MRN:  [id number]    Recommendations:     Discharge Recommendations: other (see comments) (LIT - HHPT)   Discharge Equipment Recommendations: bath bench   Barriers to discharge: None    Assessment:     Tasha Hawley is a 67 y.o. female admitted with a medical diagnosis of Congestive heart failure.  She presents with the following impairments/functional limitations: weakness, impaired endurance, impaired functional mobility, gait instability, impaired balance, decreased lower extremity function, pain, decreased safety awareness, decreased ROM, edema Tasha was moving around her room with severe FB position at the waist and using walls and furniture to mobilzie with slightly unsafe mechanics when entering the room for eval. She reports that she usually has a rollator but it is not able to fit inside her bathrooms so this is usually how she gets through her home. She was able to complete all activity, but when we were preparing for ambulation, her blood pressure was 86/48 and was not safe for ambulation at this time. We will keep her on caseload to better assess gait once her BP is more stable.    Rehab Prognosis: Good; patient would benefit from acute skilled PT services to address these deficits and reach maximum level of function.    Recent Surgery: * No surgery found *      Plan:     During this hospitalization, patient to be seen 4 x/week to address the identified rehab impairments via gait training, therapeutic activities, therapeutic exercises, neuromuscular re-education and progress toward the following goals:    Plan of Care Expires:  08/09/23    Subjective     Chief Complaint: back is hurting  Patient/Family Comments/goals: none present  Pain/Comfort:  Pain Rating 1: 9/10  Location - Side 1: Bilateral  Location 1: back    Patients cultural, spiritual, Advent conflicts given the current situation: no    Living Environment:  Lives with spouse  in 1 level home with ramp to enter  Prior to admission, patients level of function was in need of assist at times from .  Equipment used at home: CPAP, bath bench, rollator, bedside commode, wheelchair.  DME owned (not currently used): rolling walker.  Upon discharge, patient will have assistance from family.    Objective:     Communicated with nsg prior to session.  Patient found  in restroom  with oxygen, peripheral IV, telemetry  upon PT entry to room.    General Precautions: Standard,    Orthopedic Precautions:N/A   Braces: N/A  Respiratory Status: Nasal cannula, flow 4 L/min    Exams:  Cognition:   Patient is oriented to all.  Pt follows approximately 75% of verbal safety commands.    Mood: Pleasant and cooperative.   Safety Awareness: fair  Musculoskeletal:  BMI: 37  Posture:  forward bent posture at the waist and   LE ROM/Strength:   R ROM: WFL  L ROM: WFL  R Strength:   Knee extension: 4/5  Dorsiflexion: 4/5   L Strength:   Knee extension: 4/5  Dorsiflexion: 4/5   Neuromuscular:  Sensation: varied due to lymphedema  Tone/Reflexes: No impairments identified with functional mobility. No formal testing performed.   Balance:   Static sitting: good  Dynamic sitting good  Static standing: good-  Dynamic standing: good-  Visual-vestibular: No impairments identified with functional mobility. No formal testing performed.  Integument:  lower extremities red due to lymphedema  Cardiopulmonary:  Vital signs: BP 86/48 and 93/56    Edema: severe in LEs     Patient donned non slip socks and gait belt for OOB mobility      Functional Mobility:  Bed Mobility:  Supine to Sit: contact guard assistance  Sit to Supine: contact guard assistance  Transfers:  Sit to Stand:  minimum assistance with hand-held assist  Gait: ambulated in room with 1 HHA with severe FB position at the waist and FHP, used HHA and walls / doorhandles with unsafe measures      AM-PAC 6 CLICK MOBILITY  Total Score:17       Treatment & Education:    PT educated patient:  PT plan of care/role of PT  Safety with OOB mobility  Use of RW for household and community ambulation.   Energy conservation techniques   Discharge disposition    Pt  verbalized understanding       Patient left supine with bed alarm on and nsg present.    GOALS:   Multidisciplinary Problems       Physical Therapy Goals          Problem: Physical Therapy    Goal Priority Disciplines Outcome Goal Variances Interventions   Physical Therapy Goal     PT, PT/OT Ongoing, Progressing     Description: Goals to be met by: 2023     Patient will increase functional independence with mobility by performin. Supine to sit with Modified [place]  2. Sit to supine with Modified [place]  3. Sit to stand transfer with Modified [place]  4. Gait  x 100 feet with Modified [place] using Rolling Walker.   5. Stand for 15 minutes with Modified [place] using Rolling Walker                         History:     Past Medical History:   Diagnosis Date    Anticoagulant long-term use     Anxiety     Arthritis     Bilateral lower extremity edema     severe chronic    Carotid artery occlusion     Cataract     CHF (congestive heart failure)     Coronary artery disease     subtotalled LAD with collateral    Depression     Fever blister     Hard of hearing     Hypokalemia 2023    Hyponatremia 2022    Hypothyroid     Iron deficiency anemia     Lumbar radiculopathy     with chronic pain    Ocular migraine     Other emphysema 2023    Renal disorder     Sleep apnea     cpap       Past Surgical History:   Procedure Laterality Date    ADENOIDECTOMY      BACK SURGERY      x 12    CARDIAC CATHETERIZATION  2016    subtotalled LAD with right to left collaterals    CATARACT EXTRACTION W/  INTRAOCULAR LENS IMPLANT Left     Dr Coleman     CYSTOSCOPIC LITHOLAPAXY N/A 2019    Procedure: CYSTOLITHOLAPAXY;  Surgeon: Shireen Mayo MD;  Location: [facility] OR [address] [place] [address];  Service: Urology;   Laterality: N/A;    CYSTOSCOPIC LITHOLAPAXY N/A 9/3/2019    Procedure: CYSTOLITHOLAPAXY;  Surgeon: Shireen Mayo MD;  Location: [facility] OR 2ND FLR;  Service: Urology;  Laterality: N/A;    CYSTOSCOPY N/A 7/13/2021    Procedure: CYSTOSCOPY;  Surgeon: Shireen Mayo MD;  Location: [facility] OR 1ST FLR;  Service: Urology;  Laterality: N/A;    CYSTOSCOPY  11/16/2021    Procedure: CYSTOSCOPY;  Surgeon: Shireen Mayo MD;  Location: [facility] OR 1ST FLR;  Service: Urology;;    CYSTOSCOPY  7/19/2022    Procedure: CYSTOSCOPY;  Surgeon: Shireen Mayo MD;  Location: [facility] OR 1ST FLR;  Service: Urology;;    CYSTOSCOPY WITH INJECTION OF PERIURETHRAL BULKING AGENT  7/19/2022    Procedure: CYSTOSCOPY, WITH PERIURETHRAL BULKING AGENT INJECTION-MACROPLASTIQUE;  Surgeon: Shireen Mayo MD;  Location: [facility] OR 1ST FLR;  Service: Urology;;    CYSTOSCOPY WITH INJECTION OF PERIURETHRAL BULKING AGENT N/A 3/28/2023    Procedure: CYSTOSCOPY, WITH PERIURETHRAL BULKING AGENT INJECTION;  Surgeon: Shireen Mayo MD;  Location: [facility] OR 1ST FLR;  Service: Urology;  Laterality: N/A;  [place]    CYSTOSCOPY,WITH BOTULINUM TOXIN INJECTION N/A 12/13/2022    Procedure: CYSTOSCOPY,WITH BOTULINUM TOXIN INJECTION;  Surgeon: Shireen Mayo MD;  Location: [facility] OR 1ST FLR;  Service: Urology;  Laterality: N/A;  300 U    CYSTOSCOPY,WITH BOTULINUM TOXIN INJECTION N/A 3/28/2023    Procedure: CYSTOSCOPY,WITH BOTULINUM TOXIN INJECTION;  Surgeon: Shireen Mayo MD;  Location: [facility] OR 1ST FLR;  Service: Urology;  Laterality: N/A;  45 MIN.    300 UNITS    ESOPHAGOGASTRODUODENOSCOPY N/A 5/23/2018    Procedure: ESOPHAGOGASTRODUODENOSCOPY (EGD);  Surgeon: Prince Vance MD;  Location: [facility] (4TH FLR);  Service: Endoscopy;  Laterality: N/A;  r/s 'd per Dr. Vance due to family emergency- ER    ESOPHAGOGASTRODUODENOSCOPY N/A 6/23/2023    Procedure: EGD (ESOPHAGOGASTRODUODENOSCOPY);  Surgeon: Juaquin Barry MD;  Location: [facility] ([address] [place] [address]);   Service: Endoscopy;  Laterality: N/A;  Refferal: ROSEANNE MTZ  Order  tele enc 5/18/23  dx: history of a Nissen fundoplication  Additional Scheduling Information:  On home oxygen 2nd floor for airway protection also with a pain pump elevated BMI close to 40   Prep Gastroparesis   ins    HYSTERECTOMY  1975    endometriosis    INJECTION OF BOTULINUM TOXIN TYPE A  7/13/2021    Procedure: INJECTION, BOTULINUM TOXIN, 200units;  Surgeon: Shireen Mayo MD;  [facility];  Service: Urology;;    INJECTION OF BOTULINUM TOXIN TYPE A  11/16/2021    Procedure: INJECTION, BOTULINUM TOXIN, 200units;  Surgeon: Shireen Mayo MD;  [facility];  Service: Urology;;    INJECTION OF BOTULINUM TOXIN TYPE A  7/19/2022    Procedure: INJECTION, BOTULINUM TOXIN, 300 units ;  Surgeon: Shireen Mayo MD;  Location: [facility] OR [address] [place] [address];  Service: Urology;;    INSERTION, SUPRAPUBIC CATHETER N/A 12/13/2022    Procedure: INSERTION, SUPRAPUBIC CATHETER;  Surgeon: Shireen Mayo MD;  Location: [facility] OR [address] [place] [address];  Service: Urology;  Laterality: N/A;  exchange    pain pump placement      SQ Dilaudid Pump managed by Dr. Hillman, Pain Management    REMOVAL OF BONE SPUR OF FOOT Bilateral 9/16/2022    Procedure: EXCISION ARTHRITIC BONE, BILATERAL FOOT;  Surgeon: Adam Mcguire [facility];  Location: [facility];  Service: Podiatry;  Laterality: Bilateral;    REPLACEMENT OF CATHETER N/A 10/31/2019    Procedure: REPLACEMENT, CATHETER-SUPRAPUBIC;  Surgeon: Shireen Mayo MD;  Location: [facility] OR [address] [place] [address];  Service: Urology;  Laterality: N/A;    SPINAL CORD STIMULATOR REMOVAL      before [place]    SPINE SURGERY  5-13-13    CERVICAL FUSION    TONSILLECTOMY         Time Tracking:     PT Received On: 07/09/23  PT Start Time: 1201     PT Stop Time: 1224  PT Total Time (min): 23 min     Billable Minutes: Evaluation 10 and Therapeutic Activity 10      07/09/2023

## 2023-07-09 NOTE — SUBJECTIVE & OBJECTIVE
Interval History:   This morning patient complains of chronic back pain and is requesting IV Dilaudid  She otherwise has no complaints   Denies chest pain or dyspnea    Review of Systems  Objective:     Vital Signs (Most Recent):  Temp: 97 °F (36.1 °C) (07/09/23 1221)  Pulse: 61 (07/09/23 1221)  Resp: 20 (07/09/23 1221)  BP: (!) 95/56 (07/09/23 1221)  SpO2: 98 % (07/09/23 1221) Vital Signs (24h Range):  Temp:  [96.6 °F (35.9 °C)-99.3 °F (37.4 °C)] 97 °F (36.1 °C)  Pulse:  [53-99] 61  Resp:  [16-24] 20  SpO2:  [96 %-100 %] 98 %  BP: ()/(51-90) 95/56     Weight: 98.4 kg (216 lb 14.9 oz)  Body mass index is 37.24 kg/m².    Intake/Output Summary (Last 24 hours) at 7/9/2023 1443  Last data filed at 7/9/2023 1311  Gross per 24 hour   Intake --   Output 3605 ml   Net -3605 ml         Physical Exam  Constitutional:       Comments: Chronically ill-appearing   HENT:      Mouth/Throat:      Mouth: Mucous membranes are moist.      Pharynx: Oropharynx is clear.   Eyes:      Extraocular Movements: Extraocular movements intact.      Conjunctiva/sclera: Conjunctivae normal.      Pupils: Pupils are equal, round, and reactive to light.   Cardiovascular:      Rate and Rhythm: Regular rhythm. Bradycardia present.      Heart sounds: Murmur heard.   Pulmonary:      Effort: Pulmonary effort is normal.      Breath sounds: Normal breath sounds.   Abdominal:      General: Bowel sounds are normal.      Palpations: Abdomen is soft.   Musculoskeletal:         General: Normal range of motion.      Right lower leg: Edema present.      Left lower leg: Edema present.   Skin:     General: Skin is warm and dry.   Neurological:      General: No focal deficit present.      Mental Status: She is alert and oriented to person, place, and time.   Psychiatric:         Mood and Affect: Mood normal.         Behavior: Behavior normal.           Significant Labs: All pertinent labs within the past 24 hours have been reviewed.    Significant Imaging: I  have reviewed all pertinent imaging results/findings within the past 24 hours.

## 2023-07-09 NOTE — PLAN OF CARE
Problem: Adult Inpatient Plan of Care  Goal: Plan of Care Review  Outcome: Ongoing, Progressing     VIRTUAL NURSE:  Cued into patient's room.  Permission received per patient to turn camera to view patient.  Introduced as VN for night shift that will be working with floor nurse and nursing assistant.  Educated patient on VN's role in patient care and  [facility] model.  Plan of care reviewed with patient.  Education per flowsheet.   Informed patient that staff will round on them every 2 hours but to use call light for any other needs they may have; informed of fall risk and fall precautions.  Patient verbalized understanding.  Call light within reach; bed siderails up x3.  Opportunity given for questions and questions answered.  Admission assessment questions answered.  Patient c/o chronic back pain and states she uses CPAP at night; will notify bedside nurse and NP. Instructed to call for assistance.  Will cont to monitor and intervene as needed.    Labs, notes, orders, and careplan reviewed.       07/08/23 3243   Patient Request   Patient Requested C/o chronic back pain   Nurse Notification   Bedside Nurse Notified? Yes   Name of Bedside Nurse YOON Holly   Nurse Notfication Method Secure Chat   Nurse Notified Of Patient Request   Provider Notification   Provider Notified? Yes   Name of Provider NP Lisa Canada   Provider Notification Method Secure Chat   Provider Notified Of Other (See Comment)  (CPAP at night)   Admission   Initial VN Admission Questions Complete   Communication Issues? Patient Hearing   Shift   Virtual Nurse - Rounding Complete   Pain Management Interventions pain management plan reviewed with patient/caregiver   Virtual Nurse - Patient Verbalized Approval Of Camera Use;VN Rounding   Type of Frequent Check   Type Patient Rounds   Safety/Activity   Patient Rounds bed in low position;placement of personal items at bedside;bed wheels locked;call light in patient/parent reach;visualized patient;clutter  free environment maintained   Safety Promotion/Fall Prevention assistive device/personal item within reach;bed alarm set;diversional activities provided;Fall Risk reviewed with patient/family;high risk medications identified;medications reviewed;nonskid shoes/socks when out of bed;side rails raised x 3;supervised activity;instructed to call staff for mobility   Safety Precautions emergency equipment at bedside   Safety Bands on Patient Fall Risk Band;Allergy Band   Positioning   Body Position neutral body alignment;neutral head position   Head of Bed (HOB) Positioning HOB at 60 degrees   Pain/Comfort/Sleep   Preferred Pain Scale number (Numeric Rating Pain Scale)   Comfort/Acceptable Pain Level 7   Pain Body Location back   Pain Rating (0-10): Rest 9   Pain Radiation to back   Frequency constant   POSS ([place] Opioid-Induced Sed Scale) 1 - Awake and alert   Sleep/Rest/Relaxation awake

## 2023-07-10 ENCOUNTER — TELEPHONE (OUTPATIENT)
Dept: PREADMISSION TESTING | Facility: HOSPITAL | Age: 68
End: 2023-07-10
Payer: MEDICARE

## 2023-07-10 VITALS
OXYGEN SATURATION: 97 % | HEIGHT: 64 IN | HEART RATE: 81 BPM | TEMPERATURE: 97 F | BODY MASS INDEX: 37.26 KG/M2 | DIASTOLIC BLOOD PRESSURE: 64 MMHG | SYSTOLIC BLOOD PRESSURE: 116 MMHG | WEIGHT: 218.25 LBS | RESPIRATION RATE: 16 BRPM

## 2023-07-10 LAB
ANION GAP SERPL CALC-SCNC: 8 MMOL/L (ref 8–16)
BASOPHILS # BLD AUTO: 0.02 K/UL (ref 0–0.2)
BASOPHILS NFR BLD: 0.6 % (ref 0–1.9)
BUN SERPL-MCNC: 5 MG/DL (ref 8–23)
CALCIUM SERPL-MCNC: 9.1 MG/DL (ref 8.7–10.5)
CHLORIDE SERPL-SCNC: 98 MMOL/L (ref 95–110)
CO2 SERPL-SCNC: 36 MMOL/L (ref 23–29)
CREAT SERPL-MCNC: 0.9 MG/DL (ref 0.5–1.4)
DIFFERENTIAL METHOD: ABNORMAL
EOSINOPHIL # BLD AUTO: 0.2 K/UL (ref 0–0.5)
EOSINOPHIL NFR BLD: 7.2 % (ref 0–8)
ERYTHROCYTE [DISTWIDTH] IN BLOOD BY AUTOMATED COUNT: 14.6 % (ref 11.5–14.5)
EST. GFR  (NO RACE VARIABLE): >60 ML/MIN/1.73 M^2
GLUCOSE SERPL-MCNC: 123 MG/DL (ref 70–110)
HCT VFR BLD AUTO: 33.4 % (ref 37–48.5)
HGB BLD-MCNC: 10 G/DL (ref 12–16)
IMM GRANULOCYTES # BLD AUTO: 0.01 K/UL (ref 0–0.04)
IMM GRANULOCYTES NFR BLD AUTO: 0.3 % (ref 0–0.5)
LYMPHOCYTES # BLD AUTO: 1 K/UL (ref 1–4.8)
LYMPHOCYTES NFR BLD: 31.5 % (ref 18–48)
MCH RBC QN AUTO: 25.3 PG (ref 27–31)
MCHC RBC AUTO-ENTMCNC: 29.9 G/DL (ref 32–36)
MCV RBC AUTO: 84 FL (ref 82–98)
MONOCYTES # BLD AUTO: 0.4 K/UL (ref 0.3–1)
MONOCYTES NFR BLD: 13.1 % (ref 4–15)
NEUTROPHILS # BLD AUTO: 1.5 K/UL (ref 1.8–7.7)
NEUTROPHILS NFR BLD: 47.3 % (ref 38–73)
NRBC BLD-RTO: 0 /100 WBC
PLATELET # BLD AUTO: 183 K/UL (ref 150–450)
PMV BLD AUTO: 9.8 FL (ref 9.2–12.9)
POTASSIUM SERPL-SCNC: 4.6 MMOL/L (ref 3.5–5.1)
RBC # BLD AUTO: 3.96 M/UL (ref 4–5.4)
SODIUM SERPL-SCNC: 142 MMOL/L (ref 136–145)
WBC # BLD AUTO: 3.21 K/UL (ref 3.9–12.7)

## 2023-07-10 PROCEDURE — 97535 SELF CARE MNGMENT TRAINING: CPT | Mod: CO

## 2023-07-10 PROCEDURE — G0378 HOSPITAL OBSERVATION PER HR: HCPCS

## 2023-07-10 PROCEDURE — 63600175 PHARM REV CODE 636 W HCPCS: Performed by: NURSE PRACTITIONER

## 2023-07-10 PROCEDURE — 25000003 PHARM REV CODE 250: Performed by: NURSE PRACTITIONER

## 2023-07-10 PROCEDURE — 85025 COMPLETE CBC W/AUTO DIFF WBC: CPT | Performed by: NURSE PRACTITIONER

## 2023-07-10 PROCEDURE — 27000221 HC OXYGEN, UP TO 24 HOURS

## 2023-07-10 PROCEDURE — 96376 TX/PRO/DX INJ SAME DRUG ADON: CPT

## 2023-07-10 PROCEDURE — 36415 COLL VENOUS BLD VENIPUNCTURE: CPT | Performed by: NURSE PRACTITIONER

## 2023-07-10 PROCEDURE — 99900035 HC TECH TIME PER 15 MIN (STAT)

## 2023-07-10 PROCEDURE — 94761 N-INVAS EAR/PLS OXIMETRY MLT: CPT

## 2023-07-10 PROCEDURE — 97530 THERAPEUTIC ACTIVITIES: CPT | Mod: CO

## 2023-07-10 PROCEDURE — 96372 THER/PROPH/DIAG INJ SC/IM: CPT | Performed by: NURSE PRACTITIONER

## 2023-07-10 PROCEDURE — 80048 BASIC METABOLIC PNL TOTAL CA: CPT | Performed by: NURSE PRACTITIONER

## 2023-07-10 PROCEDURE — 94660 CPAP INITIATION&MGMT: CPT

## 2023-07-10 RX ORDER — ENOXAPARIN SODIUM 100 MG/ML
40 INJECTION SUBCUTANEOUS EVERY 24 HOURS
Status: DISCONTINUED | OUTPATIENT
Start: 2023-07-11 | End: 2023-07-10 | Stop reason: HOSPADM

## 2023-07-10 RX ADMIN — FLUOXETINE 60 MG: 20 CAPSULE ORAL at 09:07

## 2023-07-10 RX ADMIN — HYDROCODONE BITARTRATE AND ACETAMINOPHEN 1 TABLET: 10; 325 TABLET ORAL at 05:07

## 2023-07-10 RX ADMIN — LIOTHYRONINE SODIUM 25 MCG: 25 TABLET ORAL at 09:07

## 2023-07-10 RX ADMIN — POTASSIUM CHLORIDE 20 MEQ: 1500 TABLET, EXTENDED RELEASE ORAL at 08:07

## 2023-07-10 RX ADMIN — HYDROCODONE BITARTRATE AND ACETAMINOPHEN 1 TABLET: 10; 325 TABLET ORAL at 11:07

## 2023-07-10 RX ADMIN — MUPIROCIN: 20 OINTMENT TOPICAL at 09:07

## 2023-07-10 RX ADMIN — ENOXAPARIN SODIUM 40 MG: 40 INJECTION SUBCUTANEOUS at 09:07

## 2023-07-10 RX ADMIN — FUROSEMIDE 80 MG: 10 INJECTION, SOLUTION INTRAMUSCULAR; INTRAVENOUS at 09:07

## 2023-07-10 RX ADMIN — ATORVASTATIN CALCIUM 80 MG: 40 TABLET, FILM COATED ORAL at 09:07

## 2023-07-10 RX ADMIN — CYANOCOBALAMIN TAB 1000 MCG 2000 MCG: 1000 TAB at 09:07

## 2023-07-10 RX ADMIN — HYDROXYZINE HYDROCHLORIDE 50 MG: 25 TABLET ORAL at 12:07

## 2023-07-10 RX ADMIN — FLUTICASONE PROPIONATE 100 MCG: 50 SPRAY, METERED NASAL at 09:07

## 2023-07-10 RX ADMIN — Medication 324 MG: at 09:07

## 2023-07-10 RX ADMIN — FLUDROCORTISONE ACETATE 100 MCG: 0.1 TABLET ORAL at 09:07

## 2023-07-10 RX ADMIN — ASPIRIN 81 MG: 81 TABLET, COATED ORAL at 09:07

## 2023-07-10 RX ADMIN — LEVOTHYROXINE SODIUM 150 MCG: 75 TABLET ORAL at 05:07

## 2023-07-10 RX ADMIN — PANTOPRAZOLE SODIUM 40 MG: 40 TABLET, DELAYED RELEASE ORAL at 09:07

## 2023-07-10 NOTE — PLAN OF CARE
Problem: Adult Inpatient Plan of Care  Goal: Plan of Care Review  Outcome: Ongoing, Progressing     VIRTUAL NURSE:  Labs, notes, orders, and careplan reviewed.

## 2023-07-10 NOTE — DISCHARGE SUMMARY
[facility] Medicine  Discharge Summary      Patient Name: Tasha Hawley  MRN: [id number]  LUH: [id number]  Patient Class: OP- Observation  Admission Date: 7/8/2023  Hospital Length of Stay: 0 days  Discharge Date and Time: 7/10/2023  1:53 PM  Attending Physician: No att. providers found   Discharging Provider: Matt Duarte MD  Primary Care Provider: Mesfin Hodges Ii, MD    Primary Care Team: Networked reference to record PCT     HPI:   Tasha Hawley is a 67-year-old female with a history of COPD on 4-5 L, chronic bilateral lower extremity edema, CHF, chronic pain, chronic indwelling suprapubic [place]. She presented to the ED with 1 week of cough, congestion, shortness of breath. Patient is very familiar to the [facility] Medicine team. Reportedly her oxygen sat 70% was at home but improved to upper 90s on her regular oxygen regimen.  She denies fevers, chills, nausea, vomiting, or CP. She reports that she had difficulty with her CPAP last night which may have precipitated her SOB. She also has significant BLE lymphedema, which is chronic, but appears slightly worse than her previous admission in May. She reports that she has been getting her leg's wrapped weekly but wound care didn't come this week. She also reports compliance with her lasix. However, her diet compliance is in question. Will admit for diuresis.      * No surgery found *      Hospital Course:   No notes on file     Goals of Care Treatment Preferences:  Code Status: Full Code    Health care agent: Dangelo Hawley (spouse)  Health care agent number: [phone]    Living Will: Yes     What is most important right now is to focus on symptom/pain control, extending life as long as possible, even it it means sacrificing quality.  Accordingly, we have decided that the best plan to meet the patient's goals includes continuing with treatment.      Consults:   Consults (From admission, onward)        Status Ordering  Provider     Inpatient consult to Social Work/Case Management  Once        Provider:  (Not yet assigned)    CHIDI Pantoja          Neuro  History of stroke with residual deficit  · Residual left sided weakness and facial droop   · Continue ASA and statin      Chronic pain syndrome  · Dilaudid intrathecal pain pump in place  · Last filled per [place]  6/9 for 40 days  · Will continue home dose of norco for breakthrough pain  Follow-up with pain management clinic as outpatient    Cardiac/Vascular  * Congestive heart failure  Patient is identified as having Diastolic (HFpEF) heart failure that is Acute on chronic. CHF is currently uncontrolled due to Rales/crackles on pulmonary exam and Pulmonary edema/pleural effusion on CXR. Latest ECHO performed and demonstrates- Results for orders placed during the hospital encounter of 04/28/23    Echo Saline Bubble? Yes    Interpretation Summary  · There is no evidence of intracardiac shunting.  · The left ventricle is normal in size with normal systolic function.  · The estimated ejection fraction is 65%.  · Normal left ventricular diastolic function.  · Normal right ventricular size with normal right ventricular systolic function.  · The estimated PA systolic pressure is 42 mmHg.  · Normal central venous pressure (3 mmHg).  . Continue Furosemide and monitor clinical status closely. Monitor on telemetry. Patient is on CHF pathway.  Monitor strict Is&Os and daily weights.  Place on fluid restriction of 1.5 L. Cardiology has not been consulted. Continue to stress to patient importance of self efficacy and  on diet for CHF. Last BNP reviewed- and noted below   Recent Labs   Lab 07/08/23  1714   *   .    Patient diuresed 4.4 L with IV Lasix.  She symptomatically improved and did well on her baseline oxygen.  She was discharged in stable condition.  Provided counseling on fluid restrictions and CHF diet.      Bradycardia  Appears to have resolved, patient is  hemodynamically stable and asymptomatic  Patient has a history of bradycardia in the past      Coronary artery disease of native artery with stable angina pectoris  · No CP  · Continue ASA and statin      Renal/  Neurogenic bladder  · Suprapubic catheter      Other  Debility  · Maintain safety precautions  · Q2h turns  · HHC ordered after last discharge    PT OT evaluated and patient was discharged home    Sleep apnea  · Continue CPAP at home settings qHS        Final Active Diagnoses:    Diagnosis Date Noted POA    PRINCIPAL PROBLEM:  Congestive heart failure [I50.9] 07/18/2014 Yes    Hypokalemia [E87.6] 01/09/2023 Yes    History of stroke with residual deficit [I69.30] 01/14/2021 Not Applicable    Debility [R53.81] 01/13/2021 Yes    Bradycardia [R00.1] 11/14/2017 Yes    Neurogenic bladder [N31.9] 09/27/2016 Yes     Chronic    Coronary artery disease of native artery with stable angina pectoris [I25.118] 08/16/2016 Yes    Chronic pain syndrome [G89.4] 05/01/2013 Yes     Chronic    Sleep apnea [G47.30]  Yes      Problems Resolved During this Admission:       Discharged Condition: good    Disposition: Home-Health Care [place]    Follow Up:   Follow-up Information     Mesfin Hodges Ii, MD Follow up in 1 week(s).    Specialty: Internal Medicine  Contact information:  [address]  [place] [address] [phone]             [facility] Follow up.    Why: will resume home health services  Contact information:  [address] [facility] [facility] [address] [phone]                     Patient Instructions:   No discharge procedures on file.    Significant Diagnostic Studies: Labs:   BMP:   Recent Labs   Lab 07/08/23  1714 07/09/23  0347 07/10/23  0428   GLU 96 118* 123*    140 142   K 3.1* 3.8 4.6   CL 99 102 98   CO2 28 26 36*   BUN 5* 4* 5*   CREATININE 0.8 0.8 0.9   CALCIUM 8.7 8.8 9.1   MG  --  1.7  --    , CMP   Recent Labs   Lab 07/08/23  1714  07/09/23  0347 07/10/23  0428    140 142   K 3.1* 3.8 4.6   CL 99 102 98   CO2 28 26 36*   GLU 96 118* 123*   BUN 5* 4* 5*   CREATININE 0.8 0.8 0.9   CALCIUM 8.7 8.8 9.1   PROT 6.5  --   --    ALBUMIN 2.9*  --   --    BILITOT 0.4  --   --    ALKPHOS 111  --   --    AST 14  --   --    ALT 7*  --   --    ANIONGAP 12 12 8   , CBC   Recent Labs   Lab 07/08/23  1714 07/09/23  0347 07/10/23  0428   WBC 4.56 3.09* 3.21*   HGB 10.2* 9.4* 10.0*   HCT 32.2* 31.2* 33.4*    192 183   , INR   Lab Results   Component Value Date    INR 0.9 07/06/2023    INR 1.0 01/14/2021    INR 1.0 01/13/2021   , Lipid Panel   Lab Results   Component Value Date    CHOL 122 05/22/2023    HDL 47 05/22/2023    LDLCALC 60.4 (L) 05/22/2023    TRIG 73 05/22/2023    CHOLHDL 38.5 05/22/2023   , Troponin   Recent Labs   Lab 07/08/23 1714   TROPONINI <0.006   , A1C:   Recent Labs   Lab 07/09/23 0347   HGBA1C 5.2    and All labs within the past 24 hours have been reviewed    Pending Diagnostic Studies:     None         Medications:  Reconciled Home Medications:      Medication List      CHANGE how you take these medications    QUEtiapine 100 MG Tab  Commonly known as: SEROQUEL  TAKE 2 TABLETS (200 MG) BY MOUTH NIGHTLY  What changed:   · how much to take  · how to take this  · when to take this  · additional instructions        CONTINUE taking these medications    albuterol-ipratropium 2.5 mg-0.5 mg/3 mL nebulizer solution  Commonly known as: DUO-NEB  Take 3 mLs by nebulization every 6 (six) hours as needed for Wheezing or Shortness of Breath. Rescue     aspirin 81 MG EC tablet  Commonly known as: ECOTRIN  Take 1 tablet (81 mg total) by mouth once daily.     atorvastatin 80 MG tablet  Commonly known as: LIPITOR  Take 1 tablet (80 mg total) by mouth once daily.     butalbital-acetaminophen-caffeine -40 mg -40 mg per tablet  Commonly known as: FIORICET, ESGIC  Take 1 tablet by mouth daily as needed.     cyanocobalamin (vitamin B-12)  5,000 mcg Subl  Place 1 tablet under the tongue once daily.     docusate sodium 100 MG capsule  Commonly known as: COLACE  Take 200 mg by mouth every evening.     ferrous gluconate 324 MG tablet  Commonly known as: FERGON  Take 1 tablet (324 mg total) by mouth daily with breakfast.     fludrocortisone 0.1 mg Tab  Commonly known as: FLORINEF  Take a half-tablet (50 mcg) by mouth once daily     FLUoxetine 20 MG capsule  Take 3 capsules (60 mg total) by mouth once daily.     fluticasone propionate 50 mcg/actuation nasal spray  Commonly known as: FLONASE  2 sprays (100 mcg total) by Each Nostril route once daily.     furosemide 40 MG tablet  Commonly known as: LASIX  Take 1 tablet (40 mg total) by mouth 2 (two) times a day.     HYDROcodone-acetaminophen  mg per tablet  Commonly known as: NORCO  Take 1 tablet by mouth every 6 (six) hours as needed for breakthrough pain.     hydrocortisone 10 MG Tab  Commonly known as: CORTEF  Take 1 tablet (10 mg total) by mouth every evening.     hydrOXYzine 50 MG tablet  Commonly known as: ATARAX  Take 1 tablet (50 mg total) by mouth every 4 (four) hours as needed for Anxiety.     INTRATHECAL PAIN PUMP COMPOUND  Hydromorphone (7.5 mg/mL) infusion at 8.59 mg/day (0.3578 mg/hr) out of a total reservoir volume of 40 mL  Pump filled monthly     * ketorolac 30 mg/mL (1 mL) injection  Commonly known as: TORADOL  Inject 30 mg into the vein once.     * ketorolac 10 mg tablet  Commonly known as: TORADOL  Take 10 mg by mouth daily as needed.     levothyroxine 150 MCG tablet  Commonly known as: SYNTHROID  Take 1 tablet (150 mcg total) by mouth before breakfast.     LIDOcaine 5 %  Commonly known as: LIDODERM  Place 1 patch onto the skin once daily. Remove & Discard patch within 12 hours or as directed by MD     liothyronine 25 MCG Tab  Commonly known as: CYTOMEL  Take 1 tablet (25 mcg total) by mouth once daily.     mupirocin 2 % ointment  Commonly known as: BACTROBAN  Apply topically 2  (two) times daily. Apply small amount to each nostril twice a day for 5 days as instructed. for 5 days     nitroGLYCERIN 0.4 MG SL tablet  Commonly known as: NITROSTAT  Place 0.4 mg under the tongue every 5 (five) minutes as needed for Chest pain.     ondansetron 4 MG Tbdl  Commonly known as: ZOFRAN-ODT  Take 4 mg by mouth every 4 to 6 hours as needed.     pantoprazole 40 MG tablet  Commonly known as: PROTONIX  Take 1 tablet (40 mg total) by mouth once daily.     potassium chloride 10 MEQ Cpsr  Commonly known as: MICRO-K  Take 2 capsules (20 mEq total) by mouth once daily.     promethazine 25 MG tablet  Commonly known as: PHENERGAN  Take 25 mg by mouth every 6 (six) hours as needed for Nausea.     senna 8.6 mg tablet  Commonly known as: SENNA LAX  Take 2 tablets by mouth 2 (two) times daily.     SPIRIVA RESPIMAT 2.5 mcg/actuation inhaler  Generic drug: tiotropium bromide  Inhale 2 puffs into the lungs once daily. Controller     tiZANidine 4 MG tablet  Commonly known as: ZANAFLEX  Take 4 mg by mouth 2 (two) times daily as needed.     traZODone 300 MG tablet  Commonly known as: DESYREL  Take 1 tablet (300 mg total) by mouth every evening.         * This list has 2 medication(s) that are the same as other medications prescribed for you. Read the directions carefully, and ask your doctor or other care provider to review them with you.                Indwelling Lines/Drains at time of discharge:   Lines/Drains/Airways     Drain  Duration                Suprapubic Catheter 12/13/22 100% silicone 20 Fr. 209 days          Line  Duration                Subcutaneous Infusion Pump Abdomen (Comment) -- days                Time spent on the discharge of patient: 35 minutes         Matt Duarte MD  Department of Hospital Medicine  [facility]

## 2023-07-10 NOTE — PLAN OF CARE
[facility] - Telemetry  Initial Discharge Assessment       Primary Care Provider: Mesfin Hodges Ii, MD    Admission Diagnosis: Shortness of breath [R06.02]  Congestive heart failure, unspecified HF chronicity, unspecified heart failure type [I50.9]    Admission Date: 7/8/2023  Expected Discharge Date: 7/10/2023    Consult: dty, wound care, & PT/OT    Payor: HU[employer] MANAGED MEDICARE / Plan: HU[employer] MEDICARE PPO / Product Type: Medicare Advantage /     Extended Emergency Contact Information  Primary Emergency Contact: ChandanJaycee  Mobile Phone: [phone]  Relation: Daughter  Secondary Emergency Contact: Dangelo Hawley  Address: [address] [place] [address] [facility]  Home Phone: [phone]  Mobile Phone: [phone]  Relation: Spouse    Discharge Plan A: (P) Home Health (current with [facility])  Discharge Plan B: (P) Home with family      [facility] [place] [facility]  [address]  [place] [address]  Phone: [phone] Fax: [phone]    [facility] [place] [address]  [address]  [place] [address]  Phone: [phone] Fax: [phone]    [facility] - [place], [place] - [address]  [address] [address]  Phone: [phone] Fax: [phone]      Initial Assessment (most recent)       Adult Discharge Assessment - 07/10/23 1110          Discharge Assessment    Assessment Type Discharge Planning Assessment (P)      Confirmed/corrected address, phone number and insurance Yes (P)      Confirmed Demographics Correct on Facesheet (P)      Source of Information patient (P)      Communicated JACKIE with patient/caregiver Yes (P)      People in Home spouse;alone (P)    spouse, Dangelo Hawley ([phone])    Do you expect to return to your current living situation? Yes (P)      Do you have help at home or someone to help you manage your care at  home? Yes (P)      Prior to hospitilization cognitive status: Alert/Oriented (P)      Current cognitive status: Alert/Oriented (P)      Walking or Climbing Stairs ambulation difficulty, requires equipment (P)      Dressing/Bathing bathing difficulty, requires equipment (P)      Equipment Currently Used at Home CPAP;bath bench;rollator;oxygen (P)      Readmission within 30 days? No (P)      Patient currently being followed by outpatient case management? No (P)      Do you currently have service(s) that help you manage your care at home? No (P)      Do you take prescription medications? Yes (P)      Do you have prescription coverage? Yes (P)      Do you have any problems affording any of your prescribed medications? No (P)      Is the patient taking medications as prescribed? yes (P)      How do you get to doctors appointments? family or friend will provide (P)      Are you on dialysis? No (P)      Do you take coumadin? No (P)      Discharge Plan A Home Health (P)    current with [facility]    Discharge Plan B Home with family (P)      DME Needed Upon Discharge  none (P)      Discharge Plan discussed with: Patient (P)                       1110  Patient awake & alert sitting in recliner when CM rounded. No family present. Pt recently completed walking the hallway with PT. RW in use during ambulation. Patient was admitted with CHF & is being followed by dty, wound care, & PT/OT.    Patient lives with her spouse, has equipment to assist with ADLs, uses 4L O2 via NC at all times, is currently receiving HH services from [facility], has a chronic suprapubic catheter, & denied the need for assistance with transportation at time of discharge. Patient in agreement with plan to discharge home with HH today & stated that her spouse will bring the pt's portable O2 for transport.     Voicemail message left for Cat with [facility] informing of the pt's OBS status, plan to discharge, &  requesting a return call.     Multiple upcoming MD appointments noted.     CM updated patient's whiteboard with CM name & contact information.     3824  CM informed Prerna ([phone]) with [facility] of the pt's discharge & that the pt's legs were wrapped by the wound care nurse at [facility] prior to discharge today. Prerna stated that services will resume & that new orders are not needed. Information added to the pt's discharge paperwork.     Message sent to nurse Genny & virtual nurse Benny informing that the pt is cleared to discharge.       Will continue to follow.

## 2023-07-10 NOTE — CONSULTS
"[place] - Telemetry  Wound Care    Patient Name:  aTsha Hawley   MRN:  [id number]  Date: 7/10/2023  Diagnosis: Congestive heart failure    History:     Past Medical History:   Diagnosis Date    Anticoagulant long-term use     Anxiety     Arthritis     Bilateral lower extremity edema     severe chronic    Carotid artery occlusion     Cataract     CHF (congestive heart failure)     Coronary artery disease     subtotalled LAD with collateral    Depression     Fever blister     Hard of hearing     Hypokalemia 1/9/2023    Hyponatremia 2/4/2022    Hypothyroid     Iron deficiency anemia     Lumbar radiculopathy     with chronic pain    Ocular migraine     Other emphysema 5/22/2023    Renal disorder     Sleep apnea     cpap       Social History     Socioeconomic History    Marital status:    Tobacco Use    Smoking status: Never    Smokeless tobacco: Never   Substance and Sexual Activity    Alcohol use: Never    Drug use: Yes     Types: Marijuana     Comment: "medical marijuana gummies"    Sexual activity: Never     Partners: Male     Social Determinants of Health     Financial Resource Strain: Low Risk     Difficulty of Paying Living Expenses: Not hard at all   Food Insecurity: No Food Insecurity    Worried About Running Out of Food in the Last Year: Never true    Ran Out of Food in the Last Year: Never true   Transportation Needs: No Transportation Needs    Lack of Transportation (Medical): No    Lack of Transportation (Non-Medical): No   Physical Activity: Insufficiently Active    Days of Exercise per Week: 4 days    Minutes of Exercise per Session: 10 min   Stress: No Stress Concern Present    Feeling of Stress : Only a little   Social Connections: Moderately Isolated    Frequency of Communication with Friends and Family: More than three times a week    Frequency of Social Gatherings with Friends and Family: Three times a week    Attends Jehovah's witness Services: Never    Active Member of Clubs or Organizations: No    " Attends Club or Organization Meetings: Never    Marital Status:    Housing Stability: Low Risk     Unable to Pay for Housing in the Last Year: No    Number of Places Lived in the Last Year: 1    Unstable Housing in the Last Year: No       Precautions:     Allergies as of 07/08/2023 - Reviewed 07/08/2023   Allergen Reaction Noted    (d)-limonene flavor  09/05/2012    Bactrim [sulfamethoxazole-trimethoprim] Anaphylaxis 09/05/2012    Benadryl [diphenhydramine hcl] Shortness Of Breath 06/08/2018    Fentanyl Itching, Nausea And Vomiting, and Swelling 09/05/2012    Imitrex [sumatriptan succinate] Shortness Of Breath 01/03/2013    Percocet [oxycodone-acetaminophen] Shortness Of Breath 04/22/2015    Topamax [topiramate] Shortness Of Breath 01/03/2013    Vancomycin Anaphylaxis 09/05/2012    Butorphanol tartrate  10/25/2016    Darvocet a500 [propoxyphene n-acetaminophen]  09/05/2012    Evening primrose (oenothera biennis)  09/25/2022    Lyrica [pregabalin] Other (See Comments) 04/03/2023    White petrolatum-zinc  01/23/2017    Zinc oxide-white petrolatum  09/05/2012    Latex, natural rubber Itching and Rash 02/19/2013    Phenytoin Rash and Other (See Comments) 10/02/2018       [facility] Assessment Details/Treatment     Consult received for BLE- pt known to wound care nurse for BLE- pt with chronic edema to BLE related to CHF- treatment with compression wraps.  Applied 2 layer calamine compression wraps as pt plans for d/c and will have home health services to continue compression therapy.  No open wounds to BLE.     07/10/2023

## 2023-07-10 NOTE — PLAN OF CARE
Problem: Occupational Therapy  Goal: Occupational Therapy Goal  Description: Goals to be met by: 08/09/23     Patient will increase functional independence with ADLs by performing:    UE Dressing with Modified [place].  Grooming while standing at sink with Modified [place].  Toileting from toilet with Modified [place] for hygiene and clothing management.   Toilet transfer to toilet with Modified [place].  Upper extremity exercise program 3x15 reps per handout, with supervision.    Outcome: Ongoing, Progressing   Tasha Hawley is a 67 y.o. female with a medical diagnosis of Congestive heart failure.  Performance deficits affecting function are weakness, impaired endurance, impaired self care skills, impaired functional mobility, gait instability, impaired balance, decreased lower extremity function, pain, decreased ROM, edema.    Pt found in bed, agreeable to therapy.  Pt is progressing well towards PLOF despite complaints of significant back pain. Continue OT services to address functional goals, progressing as able.

## 2023-07-10 NOTE — ASSESSMENT & PLAN NOTE
· Dilaudid intrathecal pain pump in place  · Last filled per [place]  6/9 for 40 days  · Will continue home dose of norco for breakthrough pain  Follow-up with pain management clinic as outpatient

## 2023-07-10 NOTE — PROGRESS NOTES
Pharmacist Renal Dose Adjustment Note    Tasha Hawley is a 67 y.o. female being treated with the medication enoxaparin    Patient Data:    Vital Signs (Most Recent):  Temp: 97.5 °F (36.4 °C) (07/10/23 0716)  Pulse: (!) 53 (07/10/23 0716)  Resp: 20 (07/10/23 0716)  BP: (!) 116/58 (07/10/23 0716)  SpO2: 97 % (07/10/23 0725) Vital Signs (72h Range):  Temp:  [96.1 °F (35.6 °C)-99.3 °F (37.4 °C)]   Pulse:  [53-99]   Resp:  [16-24]   BP: ()/(41-90)   SpO2:  [95 %-100 %]      Recent Labs   Lab 07/08/23  1714 07/09/23  0347 07/10/23  0428   CREATININE 0.8 0.8 0.9     Serum creatinine: 0.9 mg/dL 07/10/23 0428  Estimated creatinine clearance: 70.3 mL/min    Medication:enoxaparin dose: 40mg frequency q12h will be changed to medication:enoxaparin dose:40mg frequency:q24h    Pharmacist's Name: Danis Ross  Pharmacist's Extension: [phone]

## 2023-07-10 NOTE — ASSESSMENT & PLAN NOTE
Discussion with patient, spouse, charge RN and MD at bedside. Patient continuing to pull off oxygen, despite education. Patient physically aggressive with RN when attempting to replace tubing. Spouse Isacc agreeable that patient is not making rational decisions about medical care, patient continuing to pull at lines. Isacc agreeable to replacement of cortrak for tube feeding and placement of bilateral soft wrist restraints to keep patient from removing lines. Patient assisted back in to bed. Soft wrist restraints placed appropriately. MD to place order for restraints and cortrak placement.   · Maintain safety precautions  · Q2h turns  · HHC ordered after last discharge    PT OT evaluated and patient was discharged home

## 2023-07-10 NOTE — TELEPHONE ENCOUNTER
Called to speak with pt, daughter Jaycee answered. States pt was hospitalized over the weekend. Her surgery is now canceled. She will book another appointment when closer to new surgery date

## 2023-07-10 NOTE — ASSESSMENT & PLAN NOTE
Patient is identified as having Diastolic (HFpEF) heart failure that is Acute on chronic. CHF is currently uncontrolled due to Rales/crackles on pulmonary exam and Pulmonary edema/pleural effusion on CXR. Latest ECHO performed and demonstrates- Results for orders placed during the hospital encounter of 04/28/23    Echo Saline Bubble? Yes    Interpretation Summary  · There is no evidence of intracardiac shunting.  · The left ventricle is normal in size with normal systolic function.  · The estimated ejection fraction is 65%.  · Normal left ventricular diastolic function.  · Normal right ventricular size with normal right ventricular systolic function.  · The estimated PA systolic pressure is 42 mmHg.  · Normal central venous pressure (3 mmHg).  . Continue Furosemide and monitor clinical status closely. Monitor on telemetry. Patient is on CHF pathway.  Monitor strict Is&Os and daily weights.  Place on fluid restriction of 1.5 L. Cardiology has not been consulted. Continue to stress to patient importance of self efficacy and  on diet for CHF. Last BNP reviewed- and noted below   Recent Labs   Lab 07/08/23  1714   *   .    Patient diuresed 4.4 L with IV Lasix.  She symptomatically improved and did well on her baseline oxygen.  She was discharged in stable condition.  Provided counseling on fluid restrictions and CHF diet.

## 2023-07-10 NOTE — PLAN OF CARE
VN reviewed discharge instructions with pt. Using teach back method.  AVS printed and handed to pt by bedside nurse.  Reviewed follow-up appointments, medications, diet, and importance of medication compliance.  Reviewed home care instructions, treatment plan, self-management, and when to seek medical attention.  Allowed time for questions.  All questions answered.  Patient verbalized complete understanding of discharge instructions and voices no concerns.     Discharge instructions complete. Wound care nurse at bedside.     Bedside nurse notified.

## 2023-07-10 NOTE — PLAN OF CARE
Problem: Adult Inpatient Plan of Care  Goal: Plan of Care Review  Outcome: Ongoing, Progressing  Goal: Patient-Specific Goal (Individualized)  Outcome: Ongoing, Progressing  Goal: Absence of Hospital-Acquired Illness or Injury  Outcome: Ongoing, Progressing  Goal: Optimal Comfort and Wellbeing  Outcome: Ongoing, Progressing  Goal: Readiness for Transition of Care  Outcome: Ongoing, Progressing     Problem: Impaired Wound Healing  Goal: Optimal Wound Healing  Outcome: Ongoing, Progressing     Problem: Behavioral Health Comorbidity  Goal: Maintenance of Behavioral Health Symptom Control  Outcome: Ongoing, Progressing     Problem: COPD (Chronic Obstructive Pulmonary Disease) Comorbidity  Goal: Maintenance of COPD Symptom Control  Outcome: Ongoing, Progressing     Problem: Heart Failure Comorbidity  Goal: Maintenance of Heart Failure Symptom Control  Outcome: Ongoing, Progressing     Problem: Obstructive Sleep Apnea Risk or Actual Comorbidity Management  Goal: Unobstructed Breathing During Sleep  Outcome: Ongoing, Progressing     Problem: Pain Chronic (Persistent) (Comorbidity Management)  Goal: Acceptable Pain Control and Functional Ability  Outcome: Ongoing, Progressing     Problem: Fall Injury Risk  Goal: Absence of Fall and Fall-Related Injury  Outcome: Ongoing, Progressing     Problem: Skin Injury Risk Increased  Goal: Skin Health and Integrity  Outcome: Ongoing, Progressing     Problem: Bowel Motility Impaired (Urinary Diversion)  Goal: Effective Bowel Elimination  Outcome: Ongoing, Progressing     Problem: Fluid and Electrolyte Imbalance (Urinary Diversion)  Goal: Fluid and Electrolyte Balance  Outcome: Ongoing, Progressing     Problem: Infection (Urinary Diversion)  Goal: Absence of Infection Signs and Symptoms  Outcome: Ongoing, Progressing     Problem: Pain (Urinary Diversion)  Goal: Acceptable Pain Control  Outcome: Ongoing, Progressing     Problem: Urine Elimination Impaired (Urinary Diversion)  Goal:  Effective Urinary Elimination  Outcome: Ongoing, Progressing     Problem: Adjustment to Illness (Heart Failure)  Goal: Optimal Coping  Outcome: Ongoing, Progressing     Problem: Cardiac Output Decreased (Heart Failure)  Goal: Optimal Cardiac Output  Outcome: Ongoing, Progressing     Problem: Dysrhythmia (Heart Failure)  Goal: Stable Heart Rate and Rhythm  Outcome: Ongoing, Progressing     Problem: Fluid Imbalance (Heart Failure)  Goal: Fluid Balance  Outcome: Ongoing, Progressing     Problem: Functional Ability Impaired (Heart Failure)  Goal: Optimal Functional Ability  Outcome: Ongoing, Progressing     Problem: Oral Intake Inadequate (Heart Failure)  Goal: Optimal Nutrition Intake  Outcome: Ongoing, Progressing     Problem: Respiratory Compromise (Heart Failure)  Goal: Effective Oxygenation and Ventilation  Outcome: Ongoing, Progressing     Problem: Sleep Disordered Breathing (Heart Failure)  Goal: Effective Breathing Pattern During Sleep  Outcome: Ongoing, Progressing

## 2023-07-10 NOTE — PT/OT/SLP PROGRESS
Occupational Therapy   Treatment    Name: Tasha Hawley  MRN: [id number]  Admitting Diagnosis:  Congestive heart failure       Recommendations:     Discharge Recommendations: other (see comments) (low intensity therapy)  Discharge Equipment Recommendations: bath bench  Barriers to discharge:  None    Assessment:     Tasha Hawley is a 67 y.o. female with a medical diagnosis of Congestive heart failure.  Performance deficits affecting function are weakness, impaired endurance, impaired self care skills, impaired functional mobility, gait instability, impaired balance, decreased lower extremity function, pain, decreased ROM, edema.    Pt found in bed, agreeable to therapy.  Pt is progressing well towards PLOF despite complaints of significant back pain. Continue OT services to address functional goals, progressing as able.      Rehab Prognosis:  Good; patient would benefit from acute skilled OT services to address these deficits and reach maximum level of function.       Plan:     Patient to be seen 3 x/week to address the above listed problems via self-care/home management, therapeutic activities, therapeutic exercises  Plan of Care Expires: 08/08/23  Plan of Care Reviewed with: significant other    Subjective     Chief Complaint: I need some ice or juice.  I'm so thirsty.  RN notified.  Pt on fluid restrictions.   Patient/Family Comments/goals: to go home  Pain/Comfort:  Pain Rating 1: 10/10 (chronic)  Location - Side 1: Bilateral  Location - Orientation 1: generalized  Location 1: back  Pain Addressed 1: Pre-medicate for activity, Nurse notified  Pain Rating Post-Intervention 1: 10/10    Objective:     Communicated with: RN prior to session.  Patient found HOB elevated with peripheral IV, oxygen, telemetry upon OT entry to room.    General Precautions: Standard, fall    Orthopedic Precautions:N/A  Braces: N/A  Respiratory Status: Nasal cannula     Occupational Performance:     Bed Mobility:    Patient  completed Rolling/Turning to Right with modified independence  Patient completed Scooting/Bridging with modified independence  Patient completed Supine to Sit with modified independence HOB elevated, use of bed rail.     Functional Mobility/Transfers:  Patient completed Sit <> Stand Transfer with stand by assistance  with  rolling walker   Patient completed Bed <> Chair Transfer using Stand Pivot technique with stand by assistance with rolling walker  Functional Mobility: Pt ambulated extended distances, in preparation for ambulatory level ADL/IADL, with SBA using RW.  Pt unable to stand upright 2/2 chronic back pain. At times she leans on L forearm on RW.  No LOB, fair pace.    Activities of Daily Living:  Toileting: stand by assistance Pt ambulated to bathroom and emptied catheter bag with SBA.       [facility] 6 Click ADL: 19    Treatment & Education:  Pt removed O2 during mobility and O2 sats 90-91% on RA.     Patient left up in chair with all lines intact, call button in reach, and RN notified    GOALS:   Multidisciplinary Problems       Occupational Therapy Goals          Problem: Occupational Therapy    Goal Priority Disciplines Outcome Interventions   Occupational Therapy Goal     OT, PT/OT Ongoing, Progressing    Description: Goals to be met by: 08/09/23     Patient will increase functional independence with ADLs by performing:    UE Dressing with Modified C[place].  Grooming while standing at sink with Modified C[place].  Toileting from toilet with Modified Cuming for hygiene and clothing management.   Toilet transfer to toilet with Modified Cuming.  Upper extremity exercise program 3x15 reps per handout, with supervision.                         Time Tracking:     OT Date of Treatment: 07/10/23  OT Start Time: 1002  OT Stop Time: 1036  OT Total Time (min): 34 min    Billable Minutes:Self Care/Home Management 12  Therapeutic Activity 22               7/10/2023

## 2023-07-11 ENCOUNTER — DOCUMENT SCAN (OUTPATIENT)
Dept: HOME HEALTH SERVICES | Facility: HOSPITAL | Age: 68
End: 2023-07-11
Payer: MEDICARE

## 2023-07-11 NOTE — PLAN OF CARE
[facility] - Telemetry  Discharge Final Note    Primary Care Provider: Mesfin Hodges Ii, MD    Expected Discharge Date: 7/10/2023    Final Discharge Note (most recent)       Final Note - 07/11/23 0745          Final Note    Assessment Type Final Discharge Note (P)      Anticipated Discharge Disposition Home-Health Care Svc (P)    [place]       Post-Acute Status    Post-Acute Authorization Home Health (P)      Home Health Status Set-up Complete/Auth obtained (P)    [place]                      Contact Info       Mesfin Hodges Ii, MD   Specialty: Internal Medicine   Relationship: PCP - General    [address]  [place] [address]   Phone: [phone]       Next Steps: Follow up in 1 week(s)    [facility] [address] [place] [address]   Phone: [phone]       Next Steps: Follow up    Instructions: will resume home health services

## 2023-07-12 ENCOUNTER — EXTERNAL HOME HEALTH (OUTPATIENT)
Dept: HOME HEALTH SERVICES | Facility: HOSPITAL | Age: 68
End: 2023-07-12
Payer: MEDICARE

## 2023-07-13 ENCOUNTER — TELEPHONE (OUTPATIENT)
Dept: PULMONOLOGY | Facility: CLINIC | Age: 68
End: 2023-07-13
Payer: MEDICARE

## 2023-07-13 ENCOUNTER — TELEPHONE (OUTPATIENT)
Dept: PREADMISSION TESTING | Facility: HOSPITAL | Age: 68
End: 2023-07-13
Payer: MEDICARE

## 2023-07-13 NOTE — TELEPHONE ENCOUNTER
I received a message from Jostin in Preop Anesthesia about Mrs Hawley needing a preop clearance for a 8-2-23 neurosurgery with Dr Condon. She has never been seen in Pulmonology. I told Jostin I will look for a new patient slot and will alert my coworkers that a visit is needed.He agreed. Jeaneth Lundberg LPN

## 2023-07-13 NOTE — TELEPHONE ENCOUNTER
----- Message from Fernanda Yuen RN sent at 7/12/2023  5:25 PM CDT -----  8/2 Please re-schedule this patient's appointment with , too close to surgery date per Van KAN

## 2023-07-17 ENCOUNTER — TELEPHONE (OUTPATIENT)
Dept: PULMONOLOGY | Facility: CLINIC | Age: 68
End: 2023-07-17
Payer: MEDICARE

## 2023-07-17 DIAGNOSIS — R06.02 SOB (SHORTNESS OF BREATH): Primary | ICD-10-CM

## 2023-07-17 DIAGNOSIS — Z01.811 PREOP RESPIRATORY EXAM: ICD-10-CM

## 2023-07-17 NOTE — PROGRESS NOTES
Subjective:      Patient ID: Tasha Hawley is a 67 y.o. female.    Chief Complaint: Disease Management    Initial Presentation  Tasha Hawley is a 67 y.o. F with a past medical history of chronic pain with long-term opioid use, recurrent UTIs, neurogenic bladder w/ suprapubic catheter, lymphedema, debility, chronic diastolic HF, depression, and anxiety who presents to clinic today as a referral for joint pain.     She has a history of multiple back surgeries. She has undergone previous cervical fusion and several lumbar surgeries. She had an intrathecal opioid pump placed that has provided adequate pain relief. Her pump will need to be replaced with Neurosurgery in the coming weeks. Currently undergoing preop testing. She follows with Pain Management. She uses PRN Vicodin for breakthrough pain.      She has pain in her lower back that radiates to the back of her legs and feet. She also has significant pain and swelling in her hands (specifically the MCPs and DIPs bilaterally) and pain on the dorsal and plantar surfaces of the feet. She believes that the pain in her hands and feet have been ongoing for at least the last 5 years. She has never seen a Rheumatologist for this problem before.     Rheumatology ROS  (+) fevers, chills (-) weight loss, (+) fatigue, (+) morning stiffness (20-30 min),  (+) headaches 2-3 x week, (-)  vision changes or loss of vision, (-) hx of red eyes including uveitis, iritis, scleritis or episcleritis, (-)  photophobia, (-) dry eyes, (+) dry mouth, (-) rash, (-) photosensitivity, (+) alopecia, (-) mucosal ulcers, (-) Raynaud's  phenomenon, (-) SQ nodules, (-) sense of skin tightening in hands, face or torso, (-)  hx pleurisy, (+) sharp chest pains that increase with deep breath, (-) lung fibrosis, (-) hemoptysis, (-) hx of DVT or PE (-) chest pains, (-) hx pericarditis (-) abdominal pain, (+) nausea, (-) vomiting, (-) diarrhea, (-) constipation, (-) melena,  (-) bloody diarrhea,  (-) UC/Crohns, (+) dysphagia, (+) GERD/Reflux, (-) hematuria, proteinuria, (-) renal failure, (-) focal weakness, (-) trouble combing hair or (+) getting out of chairs,  (+) hx of low WBC, low platelets, anemia, (-) hx of pregnancy losses/pre term deliveries/pregnancy complications, (-) genital ulcers    History  Social: denies alcohol or tobacco abuse   Medical: see above  Family: two aunts with RA, no other significant family history     Imaging/Procedures  XR Chest (07/08/2023):  FINDINGS:  There are postoperative changes in the cervical spine.  There is an intrathecal lead in place in the lower thoracic spine.     The trachea is unremarkable.  The cardiomediastinal silhouette is enlarged.  There is no evidence of free air beneath the hemidiaphragms.  There are trace bilateral pleural effusions.  There is no evidence of a pneumothorax.  There is no evidence of pneumomediastinum.  There is decrease in the degree of pulmonary vascular congestion.  There is no focal consolidation.  There are degenerative changes in the osseous structures.  Impression:  Cardiomegaly with decreased pulmonary edema.    XR Ankle Complete Bilateral (06/30/2023):  FINDINGS:  The ankle mortise is intact.  No fractures. No marrow replacement process. Prominent osteopenia noted.   Enthesopathy at the plantar fascia origin noted.  Prominent midfoot degenerative changes.  Impression:  No acute findings.    XR Foot Complete Bilateral (06/30/2023):  Osteopenia noted.  Scattered degenerative changes, most prominent in the midfoot. Probable component of underlying neuropathic arthropathy.  No acute fractures.  No marrow replacement process.  Enthesopathy at the plantar fascia origin bilaterally.  Impression:  No acute findings.    Rheumatologic labs  Elevated ESR and CRP in the past in the setting of pneumonia and lymphedema   SUAD negative x 2 (08/2016 and 07/2006)  RF negative x 2 (04/2022, 08/2016)    Objective:   BP (!) 90/54   Pulse 61   Ht  "5' 5" (1.651 m)   Wt 97.7 kg (215 lb 8 oz)   LMP  (LMP Unknown)   BMI 35.86 kg/m²   Physical Exam   Constitutional: She appears obese.   Uses a rollator   HENT:   Mouth/Throat: Mucous membranes are moist.   Eyes: Conjunctivae are normal.   Cardiovascular: Normal rate, regular rhythm, normal heart sounds and normal pulses.   Pulmonary/Chest: Effort normal. No respiratory distress.   Pulmonary Comments: Crackles in the lower lung field bilaterally   Abdominal: Soft. Bowel sounds are normal. She exhibits no distension.   Musculoskeletal:         General: Swelling, tenderness and deformity (Chris deformities on the 2nd and 3rd MCPs bilaterally) present.      Cervical back: Normal range of motion. No rigidity.      Right lower leg: Edema (2-3+) present.      Left lower leg: Edema (2-3+) present.      Comments: Ulnar deviation    Lymphadenopathy:     She has no cervical adenopathy.   Neurological: She is alert. Gait abnormal.   Skin: Skin is warm.      7/18/2023   Tender (KRISHNAMURTHY-28) 24 / 28    Swollen (KRISHNAMURTHY-28) 20 / 28    Provider Global --   Patient Global --   ESR --   CRP --   KRISHNAMURTHY-28 (ESR) --   KRISHNAMURTHY-28 (CRP) --   CDAI Score --       Widespread pain index  Note the areas which the patient has had pain over the last week:                 Shoulder-girdle, left               Shoulder-girdle, right                         Upper arm left                       Upper arm right                         Lower arm left                       Lower arm right    Hip (buttock, trochanter) left  Hip (buttock, trochanter) right                           Upper leg, left                         Upper leg, right                           Lower leg, left                         Lower leg, right                                     Jaw, left                                   Jaw, right                                        Chest                                  Abdomen                               Upper back                              Lower " back                                        Neck  Score will be from 0-19: score of 11    Symptom severity score:  Fatigue: 3  Waking Unrefreshed: 3  Cognitive Symptoms: 2   0 = no problem, 1=slight or mild problem 2= moderate; considerable problems often present and/or at a moderate level, 3 = severe, pervasive, continuous, life disturbing problem  For each of the 3 symptoms, indicate the level of severity over the past week using the Scale.  The symptom severity score is the sum of the severity of the 3 symptoms (fatigue, waking unrefreshed, and cognitive symptoms) plus the number of the following symptoms occurring during the previous 6 months:   Headaches: 2  Pain or cramps in the lower abdomen: 1  Depression: 1  The final score is between 0 and 12: score of 12    Criteria:  Patient has fibromyalgia if the following 3 conditions are met:  1.  Widespread pain index greater than or equal to 7 and symptom severity score greater than or equal to 5 or widespread pain index between 3- 6, and symptom severity score greater than or equal to 9.    2.  Symptoms have been present in a similar level for at least 3 months  3.  The patient does not have a disorder that would otherwise sufficiently explain the pain    This patient does meet criteria for Fibromyalgia.      Assessment:     1. Synovitis    2. Anemia, unspecified type    3. Fibromyalgia        Plan:     Problem List Items Addressed This Visit    None  Visit Diagnoses       Synovitis    -  Primary    Relevant Orders    CYCLIC CITRUL PEPTIDE ANTIBODY, IGG (Completed)    X-Ray Hand Complete Bilateral    X-Ray Shoulder Complete Bilateral    HIV 1/2 Ag/Ab (4th Gen) (Completed)    Hepatitis B surface antigen (Completed)    HBcAB (Completed)    Hepatitis B surface antibody (Completed)    Hepatitis C antibody (Completed)    Hepatitis A antibody, IgG (Completed)    Strongyloides IgG Antibodies    Quantiferon Gold TB    RPR    Varicella zoster antibody, IgG    SUAD Screen  w/Reflex    ALPHA 1 ANTITRYPSIN DEFIICIENCY PROFILE    Sedimentation rate (Completed)    C-REACTIVE PROTEIN (Completed)    COMPREHENSIVE METABOLIC PANEL (Completed)    Anemia, unspecified type        Relevant Orders    CBC W/ AUTO DIFFERENTIAL (Completed)    Fibromyalgia              Tasha Hawley is a 67 y.o. F with a past medical history of chronic pain with long-term opioid use, recurrent UTIs, neurogenic bladder w/ suprapubic catheter, lymphedema, debility, chronic diastolic HF, depression, and anxiety who presents to clinic today as a referral for joint pain.     Arthritis- patient has tenderness to MCPs and DIPs bilaterally and MTPs and ankles bilaterally; synovitis of 2nd and 3rd MTP bilaterally, erasmo deformities bilaterally; given the patients presentation I am concerned about an inflammatory arthritis      Fibromyalgia- WPI score of 11, SSS score of 12, patient meets criteria for fibromyalgia     Plan:   - Obtain CBC, CMP, ESR, CRP, SUAD, CCP and preDMARD labs  - Obtain bilateral hand XR's, bilateral shoulder XR's and bilateral ortho knee XR's   - We will plan to have the patient return in a few weeks for close follow-up and discussion of results     This patient was examined with Dr. Zarate. Plan discussed with the patient. Return to clinic in 3-4 weeks.    Kaitlynn Hoyt MD  Rheumatology Fellow, PGY4

## 2023-07-17 NOTE — TELEPHONE ENCOUNTER
A appointment came open for Dr Chilel at 130pm tomorrow. I placed Mrs Hawley there, she needs a preop clearance as requested by Dr Ortiz and Dr Mistry. I called Dr Jo's nurse and he was very pleased with this. I called Jaycee, daughter of Mrs Hawley and she will bring her Mother tomorrow for pulmonary function tests and to see Dr Chilel. Jeaneth Lundberg LPN

## 2023-07-17 NOTE — TELEPHONE ENCOUNTER
----- Message from Aj Stallings LPN sent at 7/13/2023 11:38 AM CDT -----  Regarding: Reschedule Appointment  Ronnie Dale , my name is Jostin and I'm in Pre-op anesthesia. I am trying to get Ms. Cordova in with you guys , she had an appt before , but was over an hour late and never rescheduled. She's due to have surgery on 8/2/23 and needed to be seen by any one ,if possible. I can call her with the dates , but if you can help me that'll be cool. Jostin [phone] (ext [phone])

## 2023-07-17 NOTE — TELEPHONE ENCOUNTER
Message sent to nurse Jostin explaining that at the moment we don't have any openings but we could add her to the waiting list.

## 2023-07-17 NOTE — ADDENDUM NOTE
Addendum  created 07/17/23 0936 by Kinsey Bass MD    Attestation recorded in Intraprocedure, Intraprocedure Attestations filed, Intraprocedure Event edited

## 2023-07-18 ENCOUNTER — HOSPITAL ENCOUNTER (OUTPATIENT)
Dept: RADIOLOGY | Facility: HOSPITAL | Age: 68
Discharge: HOME | End: 2023-07-18
Attending: STUDENT IN AN ORGANIZED HEALTH CARE EDUCATION/TRAINING PROGRAM
Payer: MEDICARE

## 2023-07-18 ENCOUNTER — OFFICE VISIT (OUTPATIENT)
Dept: PULMONOLOGY | Facility: CLINIC | Age: 68
End: 2023-07-18
Payer: MEDICARE

## 2023-07-18 ENCOUNTER — OFFICE VISIT (OUTPATIENT)
Dept: RHEUMATOLOGY | Facility: CLINIC | Age: 68
End: 2023-07-18
Payer: MEDICARE

## 2023-07-18 ENCOUNTER — HOSPITAL ENCOUNTER (OUTPATIENT)
Dept: PULMONOLOGY | Facility: CLINIC | Age: 68
Discharge: HOME | End: 2023-07-18
Payer: MEDICARE

## 2023-07-18 VITALS
DIASTOLIC BLOOD PRESSURE: 56 MMHG | OXYGEN SATURATION: 89 % | WEIGHT: 215.38 LBS | HEART RATE: 78 BPM | HEIGHT: 63 IN | BODY MASS INDEX: 38.16 KG/M2 | SYSTOLIC BLOOD PRESSURE: 92 MMHG

## 2023-07-18 VITALS
HEART RATE: 61 BPM | HEIGHT: 65 IN | DIASTOLIC BLOOD PRESSURE: 54 MMHG | WEIGHT: 215.5 LBS | BODY MASS INDEX: 35.9 KG/M2 | SYSTOLIC BLOOD PRESSURE: 90 MMHG

## 2023-07-18 VITALS — HEIGHT: 63 IN | BODY MASS INDEX: 38.16 KG/M2 | WEIGHT: 215.38 LBS

## 2023-07-18 DIAGNOSIS — R06.02 SOB (SHORTNESS OF BREATH): ICD-10-CM

## 2023-07-18 DIAGNOSIS — R53.81 PHYSICAL DECONDITIONING: ICD-10-CM

## 2023-07-18 DIAGNOSIS — I51.9 PULMONARY HYPERTENSION DUE TO DIASTOLIC SYSTEMIC VENTRICULAR DYSFUNCTION: ICD-10-CM

## 2023-07-18 DIAGNOSIS — Z01.811 PREOP RESPIRATORY EXAM: ICD-10-CM

## 2023-07-18 DIAGNOSIS — J96.11 CHRONIC RESPIRATORY FAILURE WITH HYPOXIA AND HYPERCAPNIA: ICD-10-CM

## 2023-07-18 DIAGNOSIS — M65.9 SYNOVITIS: ICD-10-CM

## 2023-07-18 DIAGNOSIS — J96.12 CHRONIC RESPIRATORY FAILURE WITH HYPOXIA AND HYPERCAPNIA: ICD-10-CM

## 2023-07-18 DIAGNOSIS — I27.29 PULMONARY HYPERTENSION DUE TO DIASTOLIC SYSTEMIC VENTRICULAR DYSFUNCTION: ICD-10-CM

## 2023-07-18 DIAGNOSIS — J96.11 CHRONIC HYPOXEMIC RESPIRATORY FAILURE: ICD-10-CM

## 2023-07-18 DIAGNOSIS — M79.7 FIBROMYALGIA: ICD-10-CM

## 2023-07-18 DIAGNOSIS — I50.32 CHRONIC DIASTOLIC HEART FAILURE: Primary | ICD-10-CM

## 2023-07-18 DIAGNOSIS — R09.89 PULMONARY AIR TRAPPING: ICD-10-CM

## 2023-07-18 DIAGNOSIS — Z01.811 PREOPERATIVE RESPIRATORY EXAMINATION: ICD-10-CM

## 2023-07-18 DIAGNOSIS — M65.9 SYNOVITIS: Primary | ICD-10-CM

## 2023-07-18 DIAGNOSIS — D64.9 ANEMIA, UNSPECIFIED TYPE: ICD-10-CM

## 2023-07-18 PROBLEM — I50.22 CHRONIC SYSTOLIC HEART FAILURE: Status: ACTIVE | Noted: 2023-07-18

## 2023-07-18 PROBLEM — J43.8 OTHER EMPHYSEMA: Status: RESOLVED | Noted: 2023-05-22 | Resolved: 2023-07-18

## 2023-07-18 LAB
DLCO ADJ PRE: 8.99 ML/(MIN*MMHG) (ref 15.51–26.98)
DLCO SINGLE BREATH LLN: 15.51
DLCO SINGLE BREATH PRE REF: 37.1 %
DLCO SINGLE BREATH REF: 21.25
DLCOC SBVA LLN: 2.94
DLCOC SBVA PRE REF: 89.6 %
DLCOC SBVA REF: 4.45
DLCOC SINGLE BREATH LLN: 15.51
DLCOC SINGLE BREATH PRE REF: 42.3 %
DLCOC SINGLE BREATH REF: 21.25
DLCOCSBVAULN: 5.97
DLCOCSINGLEBREATHULN: 26.98
DLCOSINGLEBREATHULN: 26.98
DLCOVA LLN: 2.94
DLCOVA PRE REF: 78.6 %
DLCOVA PRE: 3.5 ML/(MIN*MMHG*L) (ref 2.94–5.97)
DLCOVA REF: 4.45
DLCOVAULN: 5.97
DLVAADJ PRE: 3.99 ML/(MIN*MMHG*L) (ref 2.94–5.97)
ERV LLN: -16449.3
ERV PRE REF: 36.5 %
ERV REF: 0.7
ERVULN: ABNORMAL
FEF 25 75 LLN: 0.93
FEF 25 75 PRE REF: 40.2 %
FEF 25 75 REF: 1.94
FET100 CHG: -9.2 %
FEV05 LLN: 0.85
FEV05 REF: 1.71
FEV1 CHG: 13.9 %
FEV1 FVC LLN: 66
FEV1 FVC PRE REF: 94.3 %
FEV1 FVC REF: 79
FEV1 LLN: 1.64
FEV1 PRE REF: 49.2 %
FEV1 REF: 2.22
FEV1 VOL CHG: 0.15
FRCPLETH LLN: 1.83
FRCPLETH PREREF: 103.9 %
FRCPLETH REF: 2.65
FRCPLETHULN: 3.47
FVC CHG: 1.2 %
FVC LLN: 2.1
FVC PRE REF: 51.9 %
FVC REF: 2.85
FVC VOL CHG: 0.02
IVC PRE: 1.26 L (ref 2.1–3.63)
IVC SINGLE BREATH LLN: 2.1
IVC SINGLE BREATH PRE REF: 44.2 %
IVC SINGLE BREATH REF: 2.85
IVCSINGLEBREATHULN: 3.63
LLN IC: -16448.11
PEF LLN: 4.12
PEF PRE REF: 64.2 %
PEF REF: 5.78
PHYSICIAN COMMENT: ABNORMAL
POST FEF 25 75: 1.53 L/S (ref 0.93–3.36)
POST FET 100: 5.74 SEC
POST FEV1 FVC: 83.48 % (ref 65.77–89.7)
POST FEV1: 1.25 L (ref 1.64–2.79)
POST FEV5: 1.06 L (ref 0.85–2.57)
POST FVC: 1.49 L (ref 2.1–3.63)
POST PEF: 3.14 L/S (ref 4.12–7.44)
PRE DLCO: 7.88 ML/(MIN*MMHG) (ref 15.51–26.98)
PRE ERV: 0.26 L (ref -16449.3–16450.7)
PRE FEF 25 75: 0.78 L/S (ref 0.93–3.36)
PRE FET 100: 6.32 SEC
PRE FEV05 REF: 52.6 %
PRE FEV1 FVC: 74.13 % (ref 65.77–89.7)
PRE FEV1: 1.09 L (ref 1.64–2.79)
PRE FEV5: 0.9 L (ref 0.85–2.57)
PRE FRC PL: 2.75 L (ref 1.83–3.47)
PRE FVC: 1.48 L (ref 2.1–3.63)
PRE IC: 1.22 L (ref -16448.11–16451.89)
PRE PEF: 3.71 L/S (ref 4.12–7.44)
PRE REF IC: 64.4 %
PRE RV: 2.5 L (ref 1.38–2.53)
PRE TLC: 3.97 L (ref 3.78–5.76)
RAW PRE REF: 201.4 %
RAW PRE: 6.16 CMH2O*S/L (ref 3.06–3.06)
RAW REF: 3.06
REF IC: 1.89
RV LLN: 1.38
RV PRE REF: 127.9 %
RV REF: 1.95
RVTLC LLN: 32
RVTLC PRE REF: 151.8 %
RVTLC PRE: 62.86 % (ref 31.81–50.99)
RVTLC REF: 41
RVTLCULN: 51
RVULN: 2.53
SGAW PRE REF: 53.1 %
SGAW PRE: 0.05 1/(CMH2O*S) (ref 0.1–0.1)
SGAW REF: 0.1
TLC LLN: 3.78
TLC PRE REF: 83.3 %
TLC REF: 4.77
TLC ULN: 5.76
ULN IC: ABNORMAL
VA PRE: 2.25 L (ref 4.62–4.62)
VA SINGLE BREATH LLN: 4.62
VA SINGLE BREATH PRE REF: 48.7 %
VA SINGLE BREATH REF: 4.62
VASINGLEBREATHULN: 4.62
VC LLN: 2.1
VC PRE REF: 51.9 %
VC PRE: 1.48 L (ref 2.1–3.63)
VC REF: 2.85
VC ULN: 3.63

## 2023-07-18 PROCEDURE — 1159F MED LIST DOCD IN RCRD: CPT | Mod: CPTII,S$GLB,, | Performed by: INTERNAL MEDICINE

## 2023-07-18 PROCEDURE — 3288F FALL RISK ASSESSMENT DOCD: CPT | Mod: CPTII,S$GLB,, | Performed by: INTERNAL MEDICINE

## 2023-07-18 PROCEDURE — 99214 PR OFFICE/OUTPT VISIT, EST, LEVL IV, 30-39 MIN: ICD-10-PCS | Mod: 25,S$GLB,, | Performed by: INTERNAL MEDICINE

## 2023-07-18 PROCEDURE — 73030 X-RAY EXAM OF SHOULDER: CPT | Mod: TC,50

## 2023-07-18 PROCEDURE — 94729 PR C02/MEMBANE DIFFUSE CAPACITY: ICD-10-PCS | Mod: S$GLB,,, | Performed by: INTERNAL MEDICINE

## 2023-07-18 PROCEDURE — 94060 EVALUATION OF WHEEZING: CPT | Mod: S$GLB,,, | Performed by: INTERNAL MEDICINE

## 2023-07-18 PROCEDURE — 1126F PR PAIN SEVERITY QUANTIFIED, NO PAIN PRESENT: ICD-10-PCS | Mod: CPTII,S$GLB,, | Performed by: INTERNAL MEDICINE

## 2023-07-18 PROCEDURE — 99999 PR PBB SHADOW E&M-EST. PATIENT-LVL V: CPT | Mod: PBBFAC,,, | Performed by: INTERNAL MEDICINE

## 2023-07-18 PROCEDURE — 73030 X-RAY EXAM OF SHOULDER: CPT | Mod: 26,50,, | Performed by: RADIOLOGY

## 2023-07-18 PROCEDURE — 99999 PR PBB SHADOW E&M-EST. PATIENT-LVL V: ICD-10-PCS | Mod: PBBFAC,,, | Performed by: INTERNAL MEDICINE

## 2023-07-18 PROCEDURE — 73130 X-RAY EXAM OF HAND: CPT | Mod: TC,50

## 2023-07-18 PROCEDURE — 73130 X-RAY EXAM OF HAND: CPT | Mod: 26,LT,, | Performed by: RADIOLOGY

## 2023-07-18 PROCEDURE — 3288F PR FALLS RISK ASSESSMENT DOCUMENTED: ICD-10-PCS | Mod: CPTII,GC,S$GLB, | Performed by: STUDENT IN AN ORGANIZED HEALTH CARE EDUCATION/TRAINING PROGRAM

## 2023-07-18 PROCEDURE — 3288F FALL RISK ASSESSMENT DOCD: CPT | Mod: CPTII,GC,S$GLB, | Performed by: STUDENT IN AN ORGANIZED HEALTH CARE EDUCATION/TRAINING PROGRAM

## 2023-07-18 PROCEDURE — 99999 PR PBB SHADOW E&M-EST. PATIENT-LVL III: CPT | Mod: PBBFAC,GC,, | Performed by: STUDENT IN AN ORGANIZED HEALTH CARE EDUCATION/TRAINING PROGRAM

## 2023-07-18 PROCEDURE — 3074F SYST BP LT 130 MM HG: CPT | Mod: CPTII,GC,S$GLB, | Performed by: STUDENT IN AN ORGANIZED HEALTH CARE EDUCATION/TRAINING PROGRAM

## 2023-07-18 PROCEDURE — 99214 OFFICE O/P EST MOD 30 MIN: CPT | Mod: GC,S$GLB,, | Performed by: STUDENT IN AN ORGANIZED HEALTH CARE EDUCATION/TRAINING PROGRAM

## 2023-07-18 PROCEDURE — 1100F PTFALLS ASSESS-DOCD GE2>/YR: CPT | Mod: CPTII,GC,S$GLB, | Performed by: STUDENT IN AN ORGANIZED HEALTH CARE EDUCATION/TRAINING PROGRAM

## 2023-07-18 PROCEDURE — 94729 DIFFUSING CAPACITY: CPT | Mod: S$GLB,,, | Performed by: INTERNAL MEDICINE

## 2023-07-18 PROCEDURE — 1100F PR PT FALLS ASSESS DOC 2+ FALLS/FALL W/INJURY/YR: ICD-10-PCS | Mod: CPTII,GC,S$GLB, | Performed by: STUDENT IN AN ORGANIZED HEALTH CARE EDUCATION/TRAINING PROGRAM

## 2023-07-18 PROCEDURE — 73130 PR  X-RAY HAND 3+ VW: ICD-10-PCS | Mod: 26,LT,, | Performed by: RADIOLOGY

## 2023-07-18 PROCEDURE — 73560 X-RAY EXAM OF KNEE 1 OR 2: CPT | Mod: 26,LT,, | Performed by: RADIOLOGY

## 2023-07-18 PROCEDURE — 94726 PLETHYSMOGRAPHY LUNG VOLUMES: CPT | Mod: S$GLB,,, | Performed by: INTERNAL MEDICINE

## 2023-07-18 PROCEDURE — 3008F PR BODY MASS INDEX (BMI) DOCUMENTED: ICD-10-PCS | Mod: CPTII,GC,S$GLB, | Performed by: STUDENT IN AN ORGANIZED HEALTH CARE EDUCATION/TRAINING PROGRAM

## 2023-07-18 PROCEDURE — 3078F DIAST BP <80 MM HG: CPT | Mod: CPTII,GC,S$GLB, | Performed by: STUDENT IN AN ORGANIZED HEALTH CARE EDUCATION/TRAINING PROGRAM

## 2023-07-18 PROCEDURE — 73560 X-RAY EXAM OF KNEE 1 OR 2: CPT | Mod: 26,RT,, | Performed by: RADIOLOGY

## 2023-07-18 PROCEDURE — 1125F AMNT PAIN NOTED PAIN PRSNT: CPT | Mod: CPTII,GC,S$GLB, | Performed by: STUDENT IN AN ORGANIZED HEALTH CARE EDUCATION/TRAINING PROGRAM

## 2023-07-18 PROCEDURE — 99999 PR PBB SHADOW E&M-EST. PATIENT-LVL III: ICD-10-PCS | Mod: PBBFAC,GC,, | Performed by: STUDENT IN AN ORGANIZED HEALTH CARE EDUCATION/TRAINING PROGRAM

## 2023-07-18 PROCEDURE — 94618 PULMONARY STRESS TESTING: ICD-10-PCS | Mod: S$GLB,,, | Performed by: INTERNAL MEDICINE

## 2023-07-18 PROCEDURE — 3044F HG A1C LEVEL LT 7.0%: CPT | Mod: CPTII,S$GLB,, | Performed by: INTERNAL MEDICINE

## 2023-07-18 PROCEDURE — 1126F AMNT PAIN NOTED NONE PRSNT: CPT | Mod: CPTII,S$GLB,, | Performed by: INTERNAL MEDICINE

## 2023-07-18 PROCEDURE — 1100F PTFALLS ASSESS-DOCD GE2>/YR: CPT | Mod: CPTII,S$GLB,, | Performed by: INTERNAL MEDICINE

## 2023-07-18 PROCEDURE — 99214 PR OFFICE/OUTPT VISIT, EST, LEVL IV, 30-39 MIN: ICD-10-PCS | Mod: GC,S$GLB,, | Performed by: STUDENT IN AN ORGANIZED HEALTH CARE EDUCATION/TRAINING PROGRAM

## 2023-07-18 PROCEDURE — 3008F PR BODY MASS INDEX (BMI) DOCUMENTED: ICD-10-PCS | Mod: CPTII,S$GLB,, | Performed by: INTERNAL MEDICINE

## 2023-07-18 PROCEDURE — 3008F BODY MASS INDEX DOCD: CPT | Mod: CPTII,S$GLB,, | Performed by: INTERNAL MEDICINE

## 2023-07-18 PROCEDURE — 3074F PR MOST RECENT SYSTOLIC BLOOD PRESSURE < 130 MM HG: ICD-10-PCS | Mod: CPTII,GC,S$GLB, | Performed by: STUDENT IN AN ORGANIZED HEALTH CARE EDUCATION/TRAINING PROGRAM

## 2023-07-18 PROCEDURE — 3074F SYST BP LT 130 MM HG: CPT | Mod: CPTII,S$GLB,, | Performed by: INTERNAL MEDICINE

## 2023-07-18 PROCEDURE — 3078F PR MOST RECENT DIASTOLIC BLOOD PRESSURE < 80 MM HG: ICD-10-PCS | Mod: CPTII,GC,S$GLB, | Performed by: STUDENT IN AN ORGANIZED HEALTH CARE EDUCATION/TRAINING PROGRAM

## 2023-07-18 PROCEDURE — 1125F PR PAIN SEVERITY QUANTIFIED, PAIN PRESENT: ICD-10-PCS | Mod: CPTII,GC,S$GLB, | Performed by: STUDENT IN AN ORGANIZED HEALTH CARE EDUCATION/TRAINING PROGRAM

## 2023-07-18 PROCEDURE — 73030 XR SHOULDER COMPLETE 2 OR MORE VIEWS BILATERAL: ICD-10-PCS | Mod: 26,50,, | Performed by: RADIOLOGY

## 2023-07-18 PROCEDURE — 94618 PULMONARY STRESS TESTING: CPT | Mod: S$GLB,,, | Performed by: INTERNAL MEDICINE

## 2023-07-18 PROCEDURE — 3288F PR FALLS RISK ASSESSMENT DOCUMENTED: ICD-10-PCS | Mod: CPTII,S$GLB,, | Performed by: INTERNAL MEDICINE

## 2023-07-18 PROCEDURE — 99214 OFFICE O/P EST MOD 30 MIN: CPT | Mod: 25,S$GLB,, | Performed by: INTERNAL MEDICINE

## 2023-07-18 PROCEDURE — 3008F BODY MASS INDEX DOCD: CPT | Mod: CPTII,GC,S$GLB, | Performed by: STUDENT IN AN ORGANIZED HEALTH CARE EDUCATION/TRAINING PROGRAM

## 2023-07-18 PROCEDURE — 1159F PR MEDICATION LIST DOCUMENTED IN MEDICAL RECORD: ICD-10-PCS | Mod: CPTII,S$GLB,, | Performed by: INTERNAL MEDICINE

## 2023-07-18 PROCEDURE — 73130 X-RAY EXAM OF HAND: CPT | Mod: 26,RT,, | Performed by: RADIOLOGY

## 2023-07-18 PROCEDURE — 3078F PR MOST RECENT DIASTOLIC BLOOD PRESSURE < 80 MM HG: ICD-10-PCS | Mod: CPTII,S$GLB,, | Performed by: INTERNAL MEDICINE

## 2023-07-18 PROCEDURE — 94726 PULM FUNCT TST PLETHYSMOGRAP: ICD-10-PCS | Mod: S$GLB,,, | Performed by: INTERNAL MEDICINE

## 2023-07-18 PROCEDURE — 3078F DIAST BP <80 MM HG: CPT | Mod: CPTII,S$GLB,, | Performed by: INTERNAL MEDICINE

## 2023-07-18 PROCEDURE — 73560 XR KNEE AP STANDING WITH BOTH LATERAL: ICD-10-PCS | Mod: 26,RT,, | Performed by: RADIOLOGY

## 2023-07-18 PROCEDURE — 73560 X-RAY EXAM OF KNEE 1 OR 2: CPT | Mod: TC,50

## 2023-07-18 PROCEDURE — 1100F PR PT FALLS ASSESS DOC 2+ FALLS/FALL W/INJURY/YR: ICD-10-PCS | Mod: CPTII,S$GLB,, | Performed by: INTERNAL MEDICINE

## 2023-07-18 PROCEDURE — 3044F HG A1C LEVEL LT 7.0%: CPT | Mod: CPTII,GC,S$GLB, | Performed by: STUDENT IN AN ORGANIZED HEALTH CARE EDUCATION/TRAINING PROGRAM

## 2023-07-18 PROCEDURE — 3074F PR MOST RECENT SYSTOLIC BLOOD PRESSURE < 130 MM HG: ICD-10-PCS | Mod: CPTII,S$GLB,, | Performed by: INTERNAL MEDICINE

## 2023-07-18 PROCEDURE — 3044F PR MOST RECENT HEMOGLOBIN A1C LEVEL <7.0%: ICD-10-PCS | Mod: CPTII,S$GLB,, | Performed by: INTERNAL MEDICINE

## 2023-07-18 PROCEDURE — 94060 PR EVAL OF BRONCHOSPASM: ICD-10-PCS | Mod: S$GLB,,, | Performed by: INTERNAL MEDICINE

## 2023-07-18 PROCEDURE — 3044F PR MOST RECENT HEMOGLOBIN A1C LEVEL <7.0%: ICD-10-PCS | Mod: CPTII,GC,S$GLB, | Performed by: STUDENT IN AN ORGANIZED HEALTH CARE EDUCATION/TRAINING PROGRAM

## 2023-07-18 ASSESSMENT — ROUTINE ASSESSMENT OF PATIENT INDEX DATA (RAPID3)
MDHAQ FUNCTION SCORE: 1.1
PAIN SCORE: 9.5
WHEN YOU AWAKENED IN THE MORNING OVER THE LAST WEEK, PLEASE INDICATE THE AMOUNT OF TIME IT TAKES UNTIL YOU ARE AS LIMBER AS YOU WILL BE FOR THE DAY: FEW MINUTES
PATIENT GLOBAL ASSESSMENT SCORE: 9
AM STIFFNESS SCORE: 1, YES
TOTAL RAPID3 SCORE: 7.39
PSYCHOLOGICAL DISTRESS SCORE: 4.4
FATIGUE SCORE: 9.5

## 2023-07-18 NOTE — PROCEDURES
Tasha Hawley is a 67 y.o.  female patient, who presents for a 6 minute walk test ordered by MD Getachew.  The diagnosis is Qualify for Oxygen, Shortness of Breath; Pre-operative Evaluation.  The patient's BMI is 38.2 kg/m2.  Predicted distance (lower limit of normal) is 254.85 meters.      Test Results:    The test was completed without stopping. The total time walked was 360 seconds. During walking, the patient reported:  No complaints. The patient used a walker for assistance during testing.     07/18/2023---------Distance: 170.69 meters (560 feet)     O2 Sat % Supplemental Oxygen Heart Rate Blood Pressure Britta Scale   Pre-exercise  (Resting) 92 % Room Air 75 bpm 130/60 mmHg 0   During Exercise 93 % Room Air 83 bpm 127/58 mmHg 1   Post-exercise  (Recovery) 94 % Room Air  78 bpm       Recovery Time: 51 seconds    Performing nurse/tech: Samy MENDOZA      PREVIOUS STUDY:   The patient has not had a previous study.      CLINICAL INTERPRETATION:  Six minute walk distance is 170.69 meters (560 feet) with very light dyspnea.  During exercise, there was no significant desaturation while breathing room air.  Both blood pressure and heart rate remained stable with walking.  The patient did not report non-pulmonary symptoms during exercise.  Significant exercise impairment is likely due to subjective symptoms.  The patient did complete the study, walking 360 seconds of the 360 second test.  No previous study performed.  Based upon age and body mass index, exercise capacity is less than predicted.

## 2023-07-18 NOTE — ASSESSMENT & PLAN NOTE
PASP of 42 secondary to diastolic dysfunction. Notable fluid overload with bilateral pitting edema on exam and mild bibasilar rales. Difficult to monitor I/Os as patient has suprapubic catheter and urinary incontinence.

## 2023-07-18 NOTE — ASSESSMENT & PLAN NOTE
ARISCAT score: 22 points (age, preoperative SaO2)  Low risk  1.6% risk of in-hospital post-op pulmonary complications (composite including respiratory failure, respiratory infection, pleural effusion, atelectasis, pneumothorax, bronchospasm, aspiration pneumonitis)    If procedure lasts 2-3 hours would be intermediate risk with 13.3% risk of above complications.     KIMBERLYN postoperative respiratory failure:   3.7 %  Risk of mechanical ventilation for >48 hrs after surgery, or unplanned intubation ?30 days of surgery    KIMBERLYN postoperative pneumonia risk:   3.3 %  Risk of postoperative pneumonia    Patient should use Spiriva the morning of the procedure. Post op care should include incentive spirometry and early mobilization.

## 2023-07-18 NOTE — ASSESSMENT & PLAN NOTE
Noted on physical exam and evidence of pulmonary hypertension on echo. Aggressive BP control and appropriate diuresis.

## 2023-07-18 NOTE — ASSESSMENT & PLAN NOTE
No obstruction or significant response to bronchodilators on PFTs however Air trapping noted. Continue use of spiriva and PRN albuterol.

## 2023-07-18 NOTE — PROGRESS NOTES
Subjective:      Patient ID: Tasha Hawley is a 67 y.o. female.    Chief Complaint: Preop Clearance  and Shortness of Breath    Pt is a 66 yo CW pmh chronic pain syndrome, HFpEF, narcotic dependency, neurogenic bladder s/p suprapubic catheter, CKD3a, hypothyroidism, obesity, GERD with hiatal hernia, chronic hypercapnic respiratory failure who presents for preop pulmonary risk stratification. Patient has had hospitalizations for PNA and acute on chronic diastolic heart failure. Has trouble with oxygen tanks. Not using spiriva daily.     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  Exposure hx:  worked on air conditions and worked with asbestos.  Inhaler use: Spiriva  PRN inhaler use: nebulizer     Review of Systems   Constitutional:  Positive for fatigue. Negative for weight loss, weight gain and activity change.   Respiratory:  Positive for cough, sputum production, shortness of breath, dyspnea on extertion and use of rescue inhaler. Negative for wheezing and previous hospitialization due to pulmonary problems.    Cardiovascular:  Positive for leg swelling. Negative for chest pain and palpitations.   Gastrointestinal:  Positive for vomiting and acid reflux. Negative for nausea.   Neurological:  Negative for syncope and light-headedness.   Psychiatric/Behavioral:  Negative for confusion and sleep disturbance. The patient is not nervous/anxious.      Objective:     Physical Exam   Constitutional: She is oriented to person, place, and time. She appears well-developed. She is obese.   HENT:   Head: Normocephalic.   Cardiovascular: Normal rate, regular rhythm and normal heart sounds. Exam reveals no gallop and no friction rub.   No murmur heard.  Pulmonary/Chest: Symmetric chest wall expansion and effort normal. No stridor. No respiratory distress. She has decreased breath sounds. She has no wheezes. She has no rhonchi. She has rales (bibasilar).   Musculoskeletal:         General:  Edema (bilateral to hips) present.   Neurological: She is alert and oriented to person, place, and time. Gait abnormal.   Skin: No cyanosis. Nails show no clubbing.   Psychiatric: She has a normal mood and affect. Her behavior is normal. Judgment and thought content normal.   Vitals reviewed.    Personal Diagnostic Review    Chest x-ray: 7/8/23  Bilateral cephalization indicated increased pulmonary vascular congestion.     CT of chest performed on 3/24/23 PE protocol revealed LLL discoid atelectasis vs scarring. Hiatal hernia.     Echocardiogram: 4/29/23  There is no evidence of intracardiac shunting.  The left ventricle is normal in size with normal systolic function.  The estimated ejection fraction is 65%.  Normal left ventricular diastolic function.  Normal right ventricular size with normal right ventricular systolic function.  The estimated PA systolic pressure is 42 mmHg.  Normal central venous pressure (3 mmHg).    PFT: 7/18/23  FEV1:  1.25L (53.1%)  FVC: 1.49L (53.1%)  FEV1/FVC: 83  TLC: 3.97L (83.3%)  RV: (127.9%)  DLCO: 8.99 (42.3%)    No flowsheet data found.     Assessment:     1. Chronic diastolic heart failure    2. Chronic hypoxemic respiratory failure    3. Pulmonary hypertension due to diastolic systemic ventricular dysfunction    4. Physical deconditioning    5. Pulmonary air trapping    6. Chronic respiratory failure with hypoxia and hypercapnia         Outpatient Encounter Medications as of 7/18/2023   Medication Sig Dispense Refill    albuterol-ipratropium (DUO-NEB) 2.5 mg-0.5 mg/3 mL nebulizer solution Take 3 mLs by nebulization every 6 (six) hours as needed for Wheezing or Shortness of Breath. Rescue 90 mL 0    aspirin (ECOTRIN) 81 MG EC tablet Take 1 tablet (81 mg total) by mouth once daily. 90 tablet 3    atorvastatin (LIPITOR) 80 MG tablet Take 1 tablet (80 mg total) by mouth once daily. 90 tablet 3    butalbital-acetaminophen-caffeine -40 mg (FIORICET, ESGIC) -40 mg per tablet  Take 1 tablet by mouth daily as needed.      cyanocobalamin, vitamin B-12, 5,000 mcg Subl Place 1 tablet under the tongue once daily.      docusate sodium (COLACE) 100 MG capsule Take 200 mg by mouth every evening.      ferrous gluconate (FERGON) 324 MG tablet Take 1 tablet (324 mg total) by mouth daily with breakfast. 90 tablet 1    fludrocortisone (FLORINEF) 0.1 mg Tab Take a half-tablet (50 mcg) by mouth once daily 15 tablet 5    FLUoxetine 20 MG capsule Take 3 capsules (60 mg total) by mouth once daily. 270 capsule 3    fluticasone propionate (FLONASE) 50 mcg/actuation nasal spray 2 sprays (100 mcg total) by Each Nostril route once daily. 16 g 0    furosemide (LASIX) 40 MG tablet Take 1 tablet (40 mg total) by mouth 2 (two) times a day. 90 tablet 3    HYDROcodone-acetaminophen (NORCO)  mg per tablet Take 1 tablet by mouth every 6 (six) hours as needed for breakthrough pain.      hydrocortisone (CORTEF) 10 MG Tab Take 1 tablet (10 mg total) by mouth every evening. 30 tablet 5    hydrOXYzine (ATARAX) 50 MG tablet Take 1 tablet (50 mg total) by mouth every 4 (four) hours as needed for Anxiety. 30 tablet 0    intrathecal pain pump compound Hydromorphone (7.5 mg/mL) infusion at 8.59 mg/day (0.3578 mg/hr) out of a total reservoir volume of 40 mL  Pump filled monthly      ketorolac (TORADOL) 10 mg tablet Take 10 mg by mouth daily as needed.      ketorolac (TORADOL) 30 mg/mL (1 mL) injection Inject 30 mg into the vein once.      levothyroxine (SYNTHROID) 150 MCG tablet Take 1 tablet (150 mcg total) by mouth before breakfast. 30 tablet 11    LIDOcaine (LIDODERM) 5 % Place 1 patch onto the skin once daily. Remove & Discard patch within 12 hours or as directed by MD  0    liothyronine (CYTOMEL) 25 MCG Tab Take 1 tablet (25 mcg total) by mouth once daily. 30 tablet 11    nitroGLYCERIN (NITROSTAT) 0.4 MG SL tablet Place 0.4 mg under the tongue every 5 (five) minutes as needed for Chest pain.      ondansetron  (ZOFRAN-ODT) 4 MG TbDL Take 4 mg by mouth every 4 to 6 hours as needed.      pantoprazole (PROTONIX) 40 MG tablet Take 1 tablet (40 mg total) by mouth once daily. 90 tablet 3    potassium chloride (MICRO-K) 10 MEQ CpSR Take 2 capsules (20 mEq total) by mouth once daily. 60 capsule 11    promethazine (PHENERGAN) 25 MG tablet Take 25 mg by mouth every 6 (six) hours as needed for Nausea.      QUEtiapine (SEROQUEL) 100 MG Tab TAKE 2 TABLETS (200 MG) BY MOUTH NIGHTLY (Patient taking differently: Take 200 mg by mouth nightly.) 180 tablet 3    senna (SENNA LAX) 8.6 mg tablet Take 2 tablets by mouth 2 (two) times daily.      tiotropium bromide (SPIRIVA RESPIMAT) 2.5 mcg/actuation inhaler Inhale 2 puffs into the lungs once daily. Controller 4 g 1    tiZANidine (ZANAFLEX) 4 MG tablet Take 4 mg by mouth 2 (two) times daily as needed.      traZODone (DESYREL) 300 MG tablet Take 1 tablet (300 mg total) by mouth every evening. 90 tablet 0     No facility-administered encounter medications on file as of 7/18/2023.     Orders Placed This Encounter   Procedures    Stress test, pulmonary     Starting at 2L.     Standing Status:   Future     Number of Occurrences:   1     Standing Expiration Date:   7/18/2024     Order Specific Question:   Reason for study     Answer:   Functional status     Comments:   oxygen titration study.     Order Specific Question:   Release to patient     Answer:   Immediate       Plan:     Pulmonary air trapping  No obstruction or significant response to bronchodilators on PFTs however Air trapping noted. Continue use of spiriva and PRN albuterol.     Chronic respiratory failure with hypoxia and hypercapnia  Walk test without evidence of desaturation <88%. Baseline hypoxia to 92% with improvement with activity indicating patient likely has chronic atelectasis due to posture and decreased tidal volume. Etiology of hypercapnia difficult to pinpoint. Most likely due to opioid use decreasing tidal volume as well  as component of air trapping. No evidence of emphysema or interstitial disease on CT chest besides possible discoid atelectasis or mild scarring.   - continue spiriva.     Pulmonary hypertension due to diastolic systemic ventricular dysfunction  PASP of 42 secondary to diastolic dysfunction. Notable fluid overload with bilateral pitting edema on exam and mild bibasilar rales. Difficult to monitor I/Os as patient has suprapubic catheter and urinary incontinence.     Chronic diastolic heart failure  Noted on physical exam and evidence of pulmonary hypertension on echo. Aggressive BP control and appropriate diuresis.     Physical deconditioning  Would recommend home health physical therapy.     Preoperative respiratory examination  ARISCAT score: 22 points (age, preoperative SaO2)  Low risk  1.6% risk of in-hospital post-op pulmonary complications (composite including respiratory failure, respiratory infection, pleural effusion, atelectasis, pneumothorax, bronchospasm, aspiration pneumonitis)    If procedure lasts 2-3 hours would be intermediate risk with 13.3% risk of above complications.     KIMBERLYN postoperative respiratory failure:   3.7 %  Risk of mechanical ventilation for >48 hrs after surgery, or unplanned intubation ?30 days of surgery    KIMBERLYN postoperative pneumonia risk:   3.3 %  Risk of postoperative pneumonia    Patient should use Spiriva the morning of the procedure. Post op care should include incentive spirometry and early mobilization.     Follow up in 3 months.     Nick Chilel MD  [facility]

## 2023-07-18 NOTE — ASSESSMENT & PLAN NOTE
Walk test without evidence of desaturation <88%. Baseline hypoxia to 92% with improvement with activity indicating patient likely has chronic atelectasis due to posture and decreased tidal volume. Etiology of hypercapnia difficult to pinpoint. Most likely due to opioid use decreasing tidal volume as well as component of air trapping. No evidence of emphysema or interstitial disease on CT chest besides possible discoid atelectasis or mild scarring.   - continue spiriva.

## 2023-07-19 NOTE — PROGRESS NOTES
I have personally reviewed the history, confirmed exam findings, and discussed assessment and plan with fellow.     Polyarthritis diff dx: RA, CPPD pseudo RA, less likely  PsA  doubt SLE  Gen  OA  Fibormyalgia  Chronic LBP  Opioid dependence  Chronic neurogenic bladder with supra-pubic catheter  lymphedema    CBC, CMP, ESR, CRP, ACPA, SUAD A1AT (given h/o emphysema in non-smoker )   X-ray hands, shoulders, kneees  F/u in 2-3 wks

## 2023-07-21 ENCOUNTER — DOCUMENT SCAN (OUTPATIENT)
Dept: HOME HEALTH SERVICES | Facility: HOSPITAL | Age: 68
End: 2023-07-21
Payer: MEDICARE

## 2023-07-24 ENCOUNTER — LAB VISIT (OUTPATIENT)
Dept: LAB | Facility: HOSPITAL | Age: 68
End: 2023-07-24
Attending: INTERNAL MEDICINE
Payer: MEDICARE

## 2023-07-24 ENCOUNTER — OFFICE VISIT (OUTPATIENT)
Dept: INTERNAL MEDICINE | Facility: CLINIC | Age: 68
End: 2023-07-24
Payer: MEDICARE

## 2023-07-24 VITALS
BODY MASS INDEX: 36.7 KG/M2 | OXYGEN SATURATION: 91 % | RESPIRATION RATE: 16 BRPM | HEART RATE: 72 BPM | WEIGHT: 215 LBS | SYSTOLIC BLOOD PRESSURE: 105 MMHG | HEIGHT: 64 IN | DIASTOLIC BLOOD PRESSURE: 61 MMHG

## 2023-07-24 DIAGNOSIS — I27.29 PULMONARY HYPERTENSION DUE TO DIASTOLIC SYSTEMIC VENTRICULAR DYSFUNCTION: ICD-10-CM

## 2023-07-24 DIAGNOSIS — G89.4 CHRONIC PAIN SYNDROME: Chronic | ICD-10-CM

## 2023-07-24 DIAGNOSIS — G47.30 SLEEP APNEA, UNSPECIFIED TYPE: ICD-10-CM

## 2023-07-24 DIAGNOSIS — I77.9 BILATERAL CAROTID ARTERY DISEASE, UNSPECIFIED TYPE: ICD-10-CM

## 2023-07-24 DIAGNOSIS — Z93.59 SUPRAPUBIC CATHETER: Chronic | ICD-10-CM

## 2023-07-24 DIAGNOSIS — D50.9 IRON DEFICIENCY ANEMIA, UNSPECIFIED IRON DEFICIENCY ANEMIA TYPE: ICD-10-CM

## 2023-07-24 DIAGNOSIS — K59.03 DRUG-INDUCED CONSTIPATION: Chronic | ICD-10-CM

## 2023-07-24 DIAGNOSIS — Z86.19 HISTORY OF STAPH INFECTION: ICD-10-CM

## 2023-07-24 DIAGNOSIS — I89.0 LYMPHEDEMA: ICD-10-CM

## 2023-07-24 DIAGNOSIS — D64.9 ANEMIA, UNSPECIFIED TYPE: ICD-10-CM

## 2023-07-24 DIAGNOSIS — Z92.89 HISTORY OF STRESS TEST: ICD-10-CM

## 2023-07-24 DIAGNOSIS — F11.90 CHRONIC, CONTINUOUS USE OF OPIOIDS: ICD-10-CM

## 2023-07-24 DIAGNOSIS — J96.11 CHRONIC RESPIRATORY FAILURE WITH HYPOXIA AND HYPERCAPNIA: ICD-10-CM

## 2023-07-24 DIAGNOSIS — J96.12 CHRONIC RESPIRATORY FAILURE WITH HYPOXIA AND HYPERCAPNIA: ICD-10-CM

## 2023-07-24 DIAGNOSIS — K21.9 GASTROESOPHAGEAL REFLUX DISEASE, UNSPECIFIED WHETHER ESOPHAGITIS PRESENT: ICD-10-CM

## 2023-07-24 DIAGNOSIS — E03.9 HYPOTHYROIDISM, UNSPECIFIED TYPE: ICD-10-CM

## 2023-07-24 DIAGNOSIS — I50.9 CONGESTIVE HEART FAILURE, UNSPECIFIED HF CHRONICITY, UNSPECIFIED HEART FAILURE TYPE: ICD-10-CM

## 2023-07-24 DIAGNOSIS — F41.1 GENERALIZED ANXIETY DISORDER: ICD-10-CM

## 2023-07-24 DIAGNOSIS — R25.1 TREMOR: ICD-10-CM

## 2023-07-24 DIAGNOSIS — R00.1 BRADYCARDIA: ICD-10-CM

## 2023-07-24 DIAGNOSIS — Z01.818 PREOP EXAMINATION: Primary | ICD-10-CM

## 2023-07-24 DIAGNOSIS — I51.9 PULMONARY HYPERTENSION DUE TO DIASTOLIC SYSTEMIC VENTRICULAR DYSFUNCTION: ICD-10-CM

## 2023-07-24 DIAGNOSIS — G95.9 CERVICAL MYELOPATHY: ICD-10-CM

## 2023-07-24 DIAGNOSIS — G43.909 MIGRAINE WITHOUT STATUS MIGRAINOSUS, NOT INTRACTABLE, UNSPECIFIED MIGRAINE TYPE: ICD-10-CM

## 2023-07-24 DIAGNOSIS — N39.0 RECURRENT UTI: ICD-10-CM

## 2023-07-24 DIAGNOSIS — R29.6 FREQUENT FALLS: ICD-10-CM

## 2023-07-24 PROBLEM — Z96.89 S/P INSERTION OF SPINAL CORD STIMULATOR: Chronic | Status: RESOLVED | Noted: 2017-03-31 | Resolved: 2023-07-24

## 2023-07-24 PROBLEM — I69.30 HISTORY OF STROKE WITH RESIDUAL DEFICIT: Status: RESOLVED | Noted: 2021-01-14 | Resolved: 2023-07-24

## 2023-07-24 LAB
FERRITIN SERPL-MCNC: 100 NG/ML (ref 20–300)
HGB BLD-MCNC: 11.2 G/DL (ref 12–16)
IRON SERPL-MCNC: 57 UG/DL (ref 30–160)
SATURATED IRON: 15 % (ref 20–50)
TOTAL IRON BINDING CAPACITY: 371 UG/DL (ref 250–450)
TRANSFERRIN SERPL-MCNC: 251 MG/DL (ref 200–375)

## 2023-07-24 PROCEDURE — 1159F PR MEDICATION LIST DOCUMENTED IN MEDICAL RECORD: ICD-10-PCS | Mod: CPTII,S$GLB,, | Performed by: HOSPITALIST

## 2023-07-24 PROCEDURE — 99999 PR PBB SHADOW E&M-EST. PATIENT-LVL V: ICD-10-PCS | Mod: PBBFAC,,, | Performed by: HOSPITALIST

## 2023-07-24 PROCEDURE — 84466 ASSAY OF TRANSFERRIN: CPT | Performed by: INTERNAL MEDICINE

## 2023-07-24 PROCEDURE — 3008F BODY MASS INDEX DOCD: CPT | Mod: CPTII,S$GLB,, | Performed by: HOSPITALIST

## 2023-07-24 PROCEDURE — 3074F PR MOST RECENT SYSTOLIC BLOOD PRESSURE < 130 MM HG: ICD-10-PCS | Mod: CPTII,S$GLB,, | Performed by: HOSPITALIST

## 2023-07-24 PROCEDURE — 99215 PR OFFICE/OUTPT VISIT, EST, LEVL V, 40-54 MIN: ICD-10-PCS | Mod: S$GLB,,, | Performed by: HOSPITALIST

## 2023-07-24 PROCEDURE — 3008F PR BODY MASS INDEX (BMI) DOCUMENTED: ICD-10-PCS | Mod: CPTII,S$GLB,, | Performed by: HOSPITALIST

## 2023-07-24 PROCEDURE — 3074F SYST BP LT 130 MM HG: CPT | Mod: CPTII,S$GLB,, | Performed by: HOSPITALIST

## 2023-07-24 PROCEDURE — 1100F PTFALLS ASSESS-DOCD GE2>/YR: CPT | Mod: CPTII,S$GLB,, | Performed by: HOSPITALIST

## 2023-07-24 PROCEDURE — 3288F PR FALLS RISK ASSESSMENT DOCUMENTED: ICD-10-PCS | Mod: CPTII,S$GLB,, | Performed by: HOSPITALIST

## 2023-07-24 PROCEDURE — 99999 PR PBB SHADOW E&M-EST. PATIENT-LVL V: CPT | Mod: PBBFAC,,, | Performed by: HOSPITALIST

## 2023-07-24 PROCEDURE — 3288F FALL RISK ASSESSMENT DOCD: CPT | Mod: CPTII,S$GLB,, | Performed by: HOSPITALIST

## 2023-07-24 PROCEDURE — 1125F PR PAIN SEVERITY QUANTIFIED, PAIN PRESENT: ICD-10-PCS | Mod: CPTII,S$GLB,, | Performed by: HOSPITALIST

## 2023-07-24 PROCEDURE — 1100F PR PT FALLS ASSESS DOC 2+ FALLS/FALL W/INJURY/YR: ICD-10-PCS | Mod: CPTII,S$GLB,, | Performed by: HOSPITALIST

## 2023-07-24 PROCEDURE — 82728 ASSAY OF FERRITIN: CPT | Performed by: INTERNAL MEDICINE

## 2023-07-24 PROCEDURE — 99215 OFFICE O/P EST HI 40 MIN: CPT | Mod: S$GLB,,, | Performed by: HOSPITALIST

## 2023-07-24 PROCEDURE — 1160F PR REVIEW ALL MEDS BY PRESCRIBER/CLIN PHARMACIST DOCUMENTED: ICD-10-PCS | Mod: CPTII,S$GLB,, | Performed by: HOSPITALIST

## 2023-07-24 PROCEDURE — 36415 COLL VENOUS BLD VENIPUNCTURE: CPT | Performed by: INTERNAL MEDICINE

## 2023-07-24 PROCEDURE — 1160F RVW MEDS BY RX/DR IN RCRD: CPT | Mod: CPTII,S$GLB,, | Performed by: HOSPITALIST

## 2023-07-24 PROCEDURE — 1125F AMNT PAIN NOTED PAIN PRSNT: CPT | Mod: CPTII,S$GLB,, | Performed by: HOSPITALIST

## 2023-07-24 PROCEDURE — 3078F PR MOST RECENT DIASTOLIC BLOOD PRESSURE < 80 MM HG: ICD-10-PCS | Mod: CPTII,S$GLB,, | Performed by: HOSPITALIST

## 2023-07-24 PROCEDURE — 85018 HEMOGLOBIN: CPT | Performed by: INTERNAL MEDICINE

## 2023-07-24 PROCEDURE — 3044F PR MOST RECENT HEMOGLOBIN A1C LEVEL <7.0%: ICD-10-PCS | Mod: CPTII,S$GLB,, | Performed by: HOSPITALIST

## 2023-07-24 PROCEDURE — 3044F HG A1C LEVEL LT 7.0%: CPT | Mod: CPTII,S$GLB,, | Performed by: HOSPITALIST

## 2023-07-24 PROCEDURE — 1159F MED LIST DOCD IN RCRD: CPT | Mod: CPTII,S$GLB,, | Performed by: HOSPITALIST

## 2023-07-24 PROCEDURE — 3078F DIAST BP <80 MM HG: CPT | Mod: CPTII,S$GLB,, | Performed by: HOSPITALIST

## 2023-07-24 NOTE — ASSESSMENT & PLAN NOTE
And depression     On     Atarax   Fluoxetine   Seroquel   Trazodone     Doing good     I suggest monitoring the sodium as SIADH from Fluoxetine, Seroquel   use and hypersecretion of ADH associated with surgery can reduce sodium in the perioperative period  Has good family support

## 2023-07-24 NOTE — ASSESSMENT & PLAN NOTE
Neurogenic bladder   Gets Botox injections- behind with the injection   She currently does not have UTI  No fever

## 2023-07-24 NOTE — ASSESSMENT & PLAN NOTE
Having diarrhea, Constipation    Suggested working on bowel movements in preparation for surgery   Constipation- I suggest giving laxative regimen as opioid use,reduced ambulation  can increase the constipation

## 2023-07-24 NOTE — ASSESSMENT & PLAN NOTE
Fluid status - base line   On Fluid restriction     Patient has history of heart failure that seems  compensated . I suggest continuation of the maintenance Diuretic regimen while monitoring the renal function and electrolytes  in the perioperative period . Please keep a record of the Input and Out put and weigh patient daily so that fluid overload can be detected and acted upon promptly . I suggest providing low  sodium diet   I suggest avoidance of the ordering of continuous IV fluids and if IV fluids are required ,to order for a specific duration of time and consider ordering lower IV fluid rate

## 2023-07-24 NOTE — OUTPATIENT SUBJECTIVE & OBJECTIVE
"Outpatient Subjective & Objective     Chief complaint-Preoperative evaluation, Perioperative Medical management, complication reduction plan     Active cardiac conditions- none    Revised cardiac risk index predictors- history of heart failure    Functional capacity -Examples of physical activity, uses walker, stays mobile , house work, walks, gardening,  can take a flight of stairs holding on to the railing----- She can undertake all the above activities without  chest pain,chest tightness, Shortness of breath without O2  ,dizziness,lightheadedness making her exercise tolerance more,   than 4 Mets.       Review of Systems   Constitutional:  Negative for chills and fever.        Fluid fluctuation   HENT:          Sleep apnea     Eyes:         No new visual changes- none   Respiratory:          No cough , phlegm    No Hemoptysis   Cardiovascular:         As noted   Gastrointestinal:         No overt GI/ blood losses  Bowel movements- diarrhea.  / constipated   Endocrine:        Prednisone use > 20 mg daily for 3 weeks- none   Genitourinary:         Supra pubic catheter    Musculoskeletal:         As above      Skin:  Negative for rash.   Neurological:  Negative for syncope.        No unilateral weakness   Hematological:         Current use of Anticoagulants  None   Psychiatric/Behavioral:            No SI/HI     No vascular stenting         No anesthesia, bleeding, cardiac problems, PONV with previous surgeries/procedures.  Medications and Allergies reviewed in epic.   FH- No anesthesia,bleeding / venous thrombosis ,  heart disease in family      Physical Exam  Blood pressure 105/61, pulse 72, resp. rate 16, height 5' 4" (1.626 m), weight 97.5 kg (215 lb), SpO2 (!) 91 %.      Physical Exam  Constitutional- Vitals - Body mass index is 36.9 kg/m².,   Vitals:    07/24/23 1418   BP: 105/61   Pulse: 72   Resp: 16     General appearance-Conscious,Coherent  Eyes- No conjunctival icterus,pupils  round , reactive to light , " and  left sided intra ocular lens   ENT-Oral cavity- moist ,  upper denture, and lower denture    , Hearing grossly normal   Neck- No thyromegaly ,Trachea -central, No jugular venous distension,   No Carotid Bruit   Cardiovascular -Heart Sounds- Normal  and  no murmur   , No gallop rhythm   Respiratory - Normal Respiratory Effort, Normal breath sounds,  CrepitationsRt base, and  no wheeze    Peripheral pitting pedal edema-- mild, no calf pain   Gastrointestinal -Soft abdomen, No palpable masses, Non Tender,Liver,Spleen not palpable. No-- free fluid and shifting dullness  Musculoskeletal- No finger Clubbing. Strength grossly normal   Lymphatic-No Palpable cervical, axillary,Inguinal lymphadenopathy   Psychiatric - normal effect,Orientation  Rt Dorsalis pedis pulses-palpable    Lt Dorsalis pedis pulses- palpable   Rt Posterior tibial pulses -palpable   Left posterior tibial pulses -palpable   Miscellaneous -  no renal bruit and  examined on the chair    Investigations  Lab and Imaging have been reviewed in epic.    Review of Medicine tests    EKG- I had independently reviewed the EKG from--7/8/2023  It was reported to be showing     Sinus bradycardia   small R waves anterolateral leads   Abnormal ECG   When compared with ECG of 27-MAY-2023 20:23,   No significant change was found    2021    Unremarkable CTA head and neck.    Review of clinical lab tests:  Lab Results   Component Value Date    CREATININE 0.8 07/18/2023    HGB 11.2 (L) 07/24/2023     07/18/2023     May 2023    No evidence of deep venous thrombosis in either lower extremity.        Review of old records- Was done and information gathered regards to events leading to surgery and health conditions of significance in the perioperative period.    Outpatient Subjective & Objective

## 2023-07-24 NOTE — ASSESSMENT & PLAN NOTE
Never smoked tobacco  Passive smoking in the past     On Spiriva   SOB on exertion     On Supllemntal oxygen 4 l/ min    Fluid over load could be contributing   No chronic phlegm    PFT    Spirometry suggests restriction. Spirometry shows borderline improvement following bronchodilator. Lung volume determination is normal. Overall baseline spirometry shows severe ventilatory impairment. Airway mechanics are abnormal showing increased   airway resistance and decreased conductance. DLCO is moderately decreased.    Recently hospitalized due to fluid overload     Appreciate pulmonary evaluation     Breathing better     Pulmonary optimization measures    I suggest the following to help with the pulmonary status in the perioperative period     Preoperative period     - Scheduled use of inhaler treatment -  - Inhaler technique reinforcement    - Incentive spirometer use   - Increased Physical activity   - Mucolytic / Expectorant treatment  - Mucinex  - Weight  loss that helps the pulmonary status-  - Addressing sleep apnea-       Intra / post operative period     - Minimize use of sedating Medication- Opioid/ Benzodiazepine   - Consider opioid sparing anesthesia/Analgesia   - Avoid use of excessive Fio2 as it can reduce the hypoxic respiratory drive   - Consult Respiratory therapy , Physical therapy   - Cough, Deep breathing exercises   - Early ambulation   - Out of bed to chair   - Incentive spirometer use   -  Oral care , head end of bed elevation, Nutrition   - Oxygen saturation, End tidal CO2 monitoring    - Having patient in a monitored care area so that hemodynamics , respiratory status can be monitored

## 2023-07-24 NOTE — ASSESSMENT & PLAN NOTE
Sleep apnea .Uses CPAP .Suggested bringing for hospital use . Informed the risk of worsening sleep apnea in the perioperative period and suggest using CPAP use any time in 24 hrs ( day or night )for planned sleep       I suggest  caution with usage of medication that can cause respiratory suppression in the perioperative period      Avoidance of  supine sleep, weight gain , alcoholic beverages , care with , sedative , CNS depressant use indicated  since all of these can worsen CECI

## 2023-07-24 NOTE — ASSESSMENT & PLAN NOTE
Has difficulty with swallowing   GERD Symptoms -    Does not sound Cardiac   Tips to control reflux discussed     GERD-  I suggest continuation of the Proton pump inhibitor in the perioperative period . I suggest aspiration precautions    Avoids rice   Has dentures better   on regular consistency diet and on thin liquids      Dysphagia- I suggest providing soft diet or  other wide directed  in view of the preexisting dysphagia as medication used in the perioperative period can possibly increase the dysphagia. I suggest aspiration precautions

## 2023-07-24 NOTE — ASSESSMENT & PLAN NOTE
Vitals - 1 value per visit 7/9/2023 7/10/2023 7/10/2023 7/10/2023 7/10/2023   SYSTOLIC 101       DIASTOLIC 54       Pulse 63 55   53     Vitals - 1 value per visit 7/10/2023 7/10/2023 7/10/2023 7/10/2023   SYSTOLIC 109  116    DIASTOLIC 55  58    Pulse 55  53      Vitals - 1 value per visit 7/10/2023 7/10/2023 7/10/2023 7/10/2023   SYSTOLIC  116     DIASTOLIC  64     Pulse  57  81     Vitals - 1 value per visit 7/18/2023 7/18/2023 7/18/2023 7/18/2023   SYSTOLIC   90 92   DIASTOLIC   54 56   Pulse   61 78     Vitals - 1 value per visit 7/18/2023 7/24/2023 7/24/2023   SYSTOLIC   105   DIASTOLIC   61   Pulse   72     She was evaluated for addition insufficiency and to the family understanding has been ruled out and she is on a tapering dose of steroid    She is on 35 mg hydrocortisone in 24 hours time which is equal to 8.8 mg of prednisone

## 2023-07-24 NOTE — ASSESSMENT & PLAN NOTE
Left message on voicemail for mom to call back when received message in regards to scheduling surgery with Dr. Montoya.      Weight close to base line     Weight related conditions     Known to have     Hyperlipidemia   Sleep apnea   Acid reflux   Osteoarthritis    Not troubled with / Not known to have       Type 2 Diabetes    Fatty liver       Encouraged weight loss

## 2023-07-24 NOTE — PROGRESS NOTES
[facility]  Progress Note    Patient Name: Tasha Hawley  MRN: [id number]  Date of Evaluation- 07/31/2023  PCP- Mesfin Hodges Ii, MD    Future cases for Tasha Hawley [[id number]]       Case ID Status Date Time Nico Procedure Provider Location    [id number] [facility] 8/2/2023  8:00  REPLACEMENT, PUMP Smitha Condon MD [[id number]] [facility] OR 2ND FLR            HPI:  Current pain severity in the low back and the legs is 9/10 History of present illness- I had the pleasure of meeting this pleasant 67 y.o. lady in the pre op clinic prior to her elective  Neuro  surgery. The patient is new to me .   Had her daughter Ms. Champion on the phone during the consultation process    I have obtained the history by speaking to the patient and by reviewing the electronic health records.    Events leading up to surgery / History of presenting illness -    Chronic pain syndrome  Have obtained the history by speaking to the patient and her caring daughter on the phone   She has longstanding back and leg pains for which she had intrathecal pain pump placed about 3 years ago   She gets the medication filled on a monthly basis and she is planning on getting the pump replaced on August 2nd  She finds the pain pump to be helpful    On April 1, 1997 she was working and she fell backwards and hurt the back  She had multiple spine surgeries and to her understanding she had 12 surgeries and as per the daughter the problem could not be fixed and hence why she is having pain relief through pump    9/10 low back - leg pains   Pain pump helping      Relevant health conditions of significance for the perioperative period/ History of presenting illness -    Health conditions of significance for the perioperative period      - CHF   - Chronic Respiratory failure - low Oxygen high Co2- 4 l / min-  - Low BP  - Pulm HTN  - BMI 36.90  - Constipation  - Opioid   - Acid reflux  - Neurogenic bladder  - Hypothyroid   - History of Staph infection  "  -Migraine  - Depression  - Opioid     Lives in a single  level house with    2 biological son, Daughter, 1 adopted daughter , Son  Has help                 Subjective/ Objective:     Chief complaint-Preoperative evaluation, Perioperative Medical management, complication reduction plan     Active cardiac conditions- none    Revised cardiac risk index predictors- history of heart failure    Functional capacity -Examples of physical activity, uses walker, stays mobile , house work, walks, gardening,  can take a flight of stairs holding on to the railing----- She can undertake all the above activities without  chest pain,chest tightness, Shortness of breath without O2  ,dizziness,lightheadedness making her exercise tolerance more,   than 4 Mets.       Review of Systems   Constitutional:  Negative for chills and fever.        Fluid fluctuation   HENT:          Sleep apnea     Eyes:         No new visual changes- none   Respiratory:          No cough , phlegm    No Hemoptysis   Cardiovascular:         As noted   Gastrointestinal:         No overt GI/ blood losses  Bowel movements- diarrhea.  / constipated   Endocrine:        Prednisone use > 20 mg daily for 3 weeks- none   Genitourinary:         Supra pubic catheter    Musculoskeletal:         As above      Skin:  Negative for rash.   Neurological:  Negative for syncope.        No unilateral weakness   Hematological:         Current use of Anticoagulants  None   Psychiatric/Behavioral:            No SI/HI     No vascular stenting         No anesthesia, bleeding, cardiac problems, PONV with previous surgeries/procedures.  Medications and Allergies reviewed in epic.   FH- No anesthesia,bleeding / venous thrombosis ,  heart disease in family      Physical Exam  Blood pressure 105/61, pulse 72, resp. rate 16, height 5' 4" (1.626 m), weight 97.5 kg (215 lb), SpO2 (!) 91 %.      Physical Exam  Constitutional- Vitals - Body mass index is 36.9 kg/m².,   Vitals:    " 07/24/23 1418   BP: 105/61   Pulse: 72   Resp: 16     General appearance-Conscious,Coherent  Eyes- No conjunctival icterus,pupils  round , reactive to light , and  left sided intra ocular lens   ENT-Oral cavity- moist ,  upper denture, and lower denture    , Hearing grossly normal   Neck- No thyromegaly ,Trachea -central, No jugular venous distension,   No Carotid Bruit   Cardiovascular -Heart Sounds- Normal  and  no murmur   , No gallop rhythm   Respiratory - Normal Respiratory Effort, Normal breath sounds,  CrepitationsRt base, and  no wheeze    Peripheral pitting pedal edema-- mild, no calf pain   Gastrointestinal -Soft abdomen, No palpable masses, Non Tender,Liver,Spleen not palpable. No-- free fluid and shifting dullness  Musculoskeletal- No finger Clubbing. Strength grossly normal   Lymphatic-No Palpable cervical, axillary,Inguinal lymphadenopathy   Psychiatric - normal effect,Orientation  Rt Dorsalis pedis pulses-palpable    Lt Dorsalis pedis pulses- palpable   Rt Posterior tibial pulses -palpable   Left posterior tibial pulses -palpable   Miscellaneous -  no renal bruit and  examined on the chair    Investigations  Lab and Imaging have been reviewed in [facility].    Review of Medicine tests    EKG- I had independently reviewed the EKG from--7/8/2023  It was reported to be showing     Sinus bradycardia   small R waves anterolateral leads   Abnormal ECG   When compared with ECG of 27-MAY-2023 20:23,   No significant change was found    2021    Unremarkable CTA head and neck.    Review of clinical lab tests:  Lab Results   Component Value Date    CREATININE 0.8 07/18/2023    HGB 11.2 (L) 07/24/2023     07/18/2023     May 2023    No evidence of deep venous thrombosis in either lower extremity.        Review of old records- Was done and information gathered regards to events leading to surgery and health conditions of significance in the perioperative period.        Preoperative cardiac risk assessment-  The  patient does not have any active cardiac conditions . Revised cardiac risk index predictors- -1--.Functional capacity is more than 4 Mets. She will be undergoing a Neuro  procedure that carries a Moderate Risk risk     Risk of a major Cardiac event ( Defined as death, myocardial infarction, or cardiac arrest at 30 days after noncardiac surgery), based on RCRI score     6.0%       No further cardiac work up is indicated prior to proceeding with the surgery          American Society of Anesthesiologists Physical status classification ( ASA ) class: 3     Postoperative pulmonary complication risk assessment:      ARISCAT ( [place]) risk index- risk class -  Low, if duration of surgery is under 2 hours, intermediate, if duration of surgery is over 2 hours    Appreciate Pulmonologist input       Assessment/Plan:     Chronic pain syndrome  Has pain pump - planned for replacement    Cervical myelopathy  Has balance problems, dropping things , problems with smaller objects     Tremor  Lately no problem    Generalized anxiety disorder  And depression     On     Atarax   Fluoxetine   Seroquel   Trazodone     Doing good     I suggest monitoring the sodium as SIADH from Fluoxetine, Seroquel   use and hypersecretion of ADH associated with surgery can reduce sodium in the perioperative period  Has good family support      Chronic respiratory failure with hypoxia and hypercapnia  Never smoked tobacco  Passive smoking in the past     On Spiriva   SOB on exertion     On Supllemntal oxygen 4 l/ min    Fluid over load could be contributing   No chronic phlegm    PFT    Spirometry suggests restriction. Spirometry shows borderline improvement following bronchodilator. Lung volume determination is normal. Overall baseline spirometry shows severe ventilatory impairment. Airway mechanics are abnormal showing increased   airway resistance and decreased conductance. DLCO is moderately decreased.    Recently hospitalized due to fluid overload      Appreciate pulmonary evaluation     Breathing better     Pulmonary optimization measures    I suggest the following to help with the pulmonary status in the perioperative period     Preoperative period     - Scheduled use of inhaler treatment -  - Inhaler technique reinforcement    - Incentive spirometer use   - Increased Physical activity   - Mucolytic / Expectorant treatment  - Mucinex  - Weight  loss that helps the pulmonary status-  - Addressing sleep apnea-       Intra / post operative period     - Minimize use of sedating Medication- Opioid/ Benzodiazepine   - Consider opioid sparing anesthesia/Analgesia   - Avoid use of excessive Fio2 as it can reduce the hypoxic respiratory drive   - Consult Respiratory therapy , Physical therapy   - Cough, Deep breathing exercises   - Early ambulation   - Out of bed to chair   - Incentive spirometer use   -  Oral care , head end of bed elevation, Nutrition   - Oxygen saturation, End tidal CO2 monitoring    - Having patient in a monitored care area so that hemodynamics , respiratory status can be monitored                 Pulmonary hypertension due to diastolic systemic ventricular dysfunction  April 2023    There is no evidence of intracardiac shunting.  The left ventricle is normal in size with normal systolic function.  The estimated ejection fraction is 65%.  Normal left ventricular diastolic function.  Normal right ventricular size with normal right ventricular systolic function.  The estimated PA systolic pressure is 42 mmHg.  Normal central venous pressure (3 mmHg).    Pulmonary arterial hypertension       WHO functional classification       Class 3- Moderate Limitation ( with reference to fatigue or dyspnea)      Clinical classification - based on etiology       Group 3       [place] heart association functional class       Class 2        Planned Anesthesia-  General      Increased risk of skip operative morbidity, mortality         Suggest avoidance of Intra  vascular volume over load or reduced pre load       I suggest that the anaesthesiologist be aware of the Pulmonary artery hypertension , so that sedation,  anesthetic plans can be made with consideration of Pulmonary Hypertension  .    Congestive heart failure  Fluid status - base line   On Fluid restriction     Patient has history of heart failure that seems  compensated . I suggest continuation of the maintenance Diuretic regimen while monitoring the renal function and electrolytes  in the perioperative period . Please keep a record of the Input and Out put and weigh patient daily so that fluid overload can be detected and acted upon promptly . I suggest providing low  sodium diet   I suggest avoidance of the ordering of continuous IV fluids and if IV fluids are required ,to order for a specific duration of time and consider ordering lower IV fluid rate     History of stress test  2021    1. Scintigraphically negative for ischemia or infarct.  2. The global left ventricular systolic function is normal with an LV ejection fraction of greater than 75 % and no evidence of LV dilatation. Wall motion is normal.      No exertional chest discomfort  Anxiety could be contributing       Bradycardia  Vitals - 1 value per visit 7/9/2023 7/10/2023 7/10/2023 7/10/2023 7/10/2023   SYSTOLIC 101       DIASTOLIC 54       Pulse 63 55   53     Vitals - 1 value per visit 7/10/2023 7/10/2023 7/10/2023 7/10/2023   SYSTOLIC 109  116    DIASTOLIC 55  58    Pulse 55  53      Vitals - 1 value per visit 7/10/2023 7/10/2023 7/10/2023 7/10/2023   SYSTOLIC  116     DIASTOLIC  64     Pulse  57  81     Vitals - 1 value per visit 7/18/2023 7/18/2023 7/18/2023 7/18/2023   SYSTOLIC   90 92   DIASTOLIC   54 56   Pulse   61 78     Vitals - 1 value per visit 7/18/2023 7/24/2023 7/24/2023   SYSTOLIC   105   DIASTOLIC   61   Pulse   72     She was evaluated for addition insufficiency and to the family understanding has been ruled out and she is on a  tapering dose of steroid    She is on 35 mg hydrocortisone in 24 hours time which is equal to 8.8 mg of prednisone    Bilateral carotid artery disease  No stroke/ TIA    On ASA - holding for surgery     Suprapubic catheter  Gets changed once month     Recurrent UTI  Neurogenic bladder   Gets Botox injections- behind with the injection   She currently does not have UTI  No fever     Anemia  Hb about 10  Occasional rectal bleeding from straining  No marshall  No overt GI/ blood losses   Had Hematology evaluation - had IV Iron     BMI 36.0-36.9,adult  Weight close to base line     Weight related conditions     Known to have     Hyperlipidemia   Sleep apnea   Acid reflux   Osteoarthritis    Not troubled with / Not known to have       Type 2 Diabetes    Fatty liver       Encouraged weight loss    Drug-induced constipation  Having diarrhea, Constipation    Suggested working on bowel movements in preparation for surgery   Constipation- I suggest giving laxative regimen as opioid use,reduced ambulation  can increase the constipation      GERD (gastroesophageal reflux disease)  Has difficulty with swallowing   GERD Symptoms -    Does not sound Cardiac   Tips to control reflux discussed     GERD-  I suggest continuation of the Proton pump inhibitor in the perioperative period . I suggest aspiration precautions    Avoids rice   Has dentures better   on regular consistency diet and on thin liquids      Dysphagia- I suggest providing soft diet or  other wide directed  in view of the preexisting dysphagia as medication used in the perioperative period can possibly increase the dysphagia. I suggest aspiration precautions         Sleep apnea  Sleep apnea .Uses CPAP .Suggested bringing for hospital use . Informed the risk of worsening sleep apnea in the perioperative period and suggest using CPAP use any time in 24 hrs ( day or night )for planned sleep       I suggest  caution with usage of medication that can cause respiratory  suppression in the perioperative period      Avoidance of  supine sleep, weight gain , alcoholic beverages , care with , sedative , CNS depressant use indicated  since all of these can worsen CECI             Frequent falls  Fall precuation discussed     Migraine  Doing better     Hypothyroid  No overt hyper thyroid symptomns    History of staph infection  MSSA (methicillin susceptible Staphylococcus aureus) infection    Discussed nasal decolonization     Based on chart review / Patient has a reported history of Staph infection   In an attempt to prevent Surgical site infection , with the up coming surgery , suggest the following        Previous MSSA infection    Intra nasal Bactroban for 5 days (start  2 days pre op  )   IV Ancef (  If no allergy / Intolerance) ,  for surgeries that require systemic antibiotic to prevent surgical site infections    Instructions for use of  nasal Bactroban    Generic ointment or generic cream (30gram tube) - place a drop on a q tip , insert into to very tip of the nose only , then press on the nose gently to distribute.     Or Single use tube-1 gram dose - half tube in each nostril bid for 5 days .         Lymphedema      Edema- I suggested avoidance of added salt,avoidance of NSAID's, unless advised or ordered  and suggested Limb elevation and marley hose use    Chronic, continuous use of opioids  Long time   Chronic continuous opioid use- In view of the opioid use, the patient may have opioid tolerance . I suggest considering the possibility of opioid tolerance  in planning post operative pain control     Discussed possibly reducing the opioid use in preparation for surgery , so that it reduces the opioid tolerance which helps post op pain control     Suggested letting pain management know about the up coming surgery      No opioid withdrawl    On Medical [facility]- Suggested working with pain management for it is perioperative use        Preventive perioperative  care    Thromboembolic prophylaxis:  Her risk factors for thrombosis include surgical procedure and age.I suggest  thromboembolic prophylaxis ( mechanical/pharmacological, weighing the risk benefits of pharmacological agent use considering skip procedural bleeding )  during the perioperative period.I suggested being active in the post operative period.      Postoperative pulmonary complication prophylaxis-Risk factors for post operative pulmonary complications include age over 65 years and ASA class >2- I suggest incentive spirometry use, early ambulation, and end tidal carbon dioxide monitoring  , oral care , head end of bed elevation      Renal complication prophylaxis- . I suggest keeping her well hydrated   in the perioperative period.     Surgical site Infection Prophylaxis-I  suggest appropriate antibiotic for Prophylaxis against Surgical site infections  Skin antibacterial discussed       Delirium prophylaxis-Risk factors - opioid use - I suggest avoidance / minimizing the use of  Benzodiazepines ( unless the patient has been taking it on a regular basis ),Anticholinergic medication,Antihistamines ( like  Benadryl).I suggest minimizing the use of opioid medication and use of IV tylenol,if it is appropriate. I suggest using the lowest possible dose of opioids for the shortest duration possible in the perioperative period. I suggest to Keep shades/blinds open during the day, lights off and shades closed at night to encourage normal sleep/wake cycle.I encourage the presence of the family member with the patient at all times, if at all possible as mental status changes can be picked up early by the family members and they help with reorientation. I encouraged the presence of family to help with orientation in the perioperative period. Benadryl avoidance suggested        This visit was focused on Preoperative evaluation, Perioperative Medical management, complication reduction plans. I suggest that the patient  follows up with primary care or relevant sub specialists for ongoing health care.    I appreciate the opportunity to be involved in this patients care. Please feel free to contact me if there were any questions about this consultation.    Patient is optimized    Patient/ care giver/ Family member was instructed to call and update me about any changes to health,  medication, office visits ,testing out side of the skip operative care center , hospitalizations between now and surgery      Gloria Ortiz MD  Internal Medicine  [facility]   Cell Phone- [phone]    History of COVID -no   COVID vaccination status -Yes    COVID screening     No fever   No cough   No sore throat   No loss of taste or smell   No muscle aches   No nausea, vomiting , diarrhea     Checked for over-the-counter medication    In summary this is a 67 year female heading for replacement off the pain pump   She is on oxygen and has hypoxia and hypercapnia   Her oxygenation problems seems to be stemming from fluid retention problem from salt intake   She is currently under better fluid status than what she was recently when she was hospitalized   Her caring daughter who is a nurse practitioner is very involved and it was very reassuring to talk to her    I have spent --170---- minutes of time which includes, time spent to prepare to see the patient , obtaining history ,performing examination, counseling/Educating the patient , Documenting clinical information in the record      ----  7/27/2023-1009    Corresponded with endocrinologist about her steroid intake and the plan is to use stress dose steroids    50 mg hydrocortisone intravenously just before the procedure and 25 mg of hydrocortisone every eight hours for 24 hours  --  7/31/2023- 1936    Called to follow up , spoke to her daughter  to address any concerns with the up coming surgery or any questions on Medication instructions -  Doing well ,No changes to Medication,  Health -

## 2023-07-24 NOTE — HPI
Current pain severity in the low back and the legs is 9/10 History of present illness- I had the pleasure of meeting this pleasant 67 y.o. lady in the pre op clinic prior to her elective  Neuro  surgery. The patient is new to me .   Had her daughter Ms. Champion on the phone during the consultation process    I have obtained the history by speaking to the patient and by reviewing the electronic health records.    Events leading up to surgery / History of presenting illness -    Chronic pain syndrome  Have obtained the history by speaking to the patient and her caring daughter on the phone   She has longstanding back and leg pains for which she had intrathecal pain pump placed about 3 years ago   She gets the medication filled on a monthly basis and she is planning on getting the pump replaced on August 2nd  She finds the pain pump to be helpful    On April 1, 1997 she was working and she fell backwards and hurt the back  She had multiple spine surgeries and to her understanding she had 12 surgeries and as per the daughter the problem could not be fixed and hence why she is having pain relief through pump    9/10 low back - leg pains   Pain pump helping      Relevant health conditions of significance for the perioperative period/ History of presenting illness -    Health conditions of significance for the perioperative period      - CHF   - Chronic Respiratory failure - low Oxygen high Co2- 4 l / min-  - Low BP  - Pulm HTN  - BMI 36.90  - Constipation  - Opioid   - Acid reflux  - Neurogenic bladder  - Hypothyroid   - History of Staph infection   -Migraine  - Depression  - Opioid     Lives in a single  level house with    2 biological son, Daughter, 1 adopted daughter , Son  Has help

## 2023-07-24 NOTE — ASSESSMENT & PLAN NOTE
Edema- I suggested avoidance of added salt,avoidance of NSAID's, unless advised or ordered  and suggested Limb elevation and marley hose use

## 2023-07-24 NOTE — ASSESSMENT & PLAN NOTE
Long time   Chronic continuous opioid use- In view of the opioid use, the patient may have opioid tolerance . I suggest considering the possibility of opioid tolerance  in planning post operative pain control     Discussed possibly reducing the opioid use in preparation for surgery , so that it reduces the opioid tolerance which helps post op pain control     Suggested letting pain management know about the up coming surgery      No opioid withdrawl    On Medical [facility]- Suggested working with pain management for it is perioperative use

## 2023-07-24 NOTE — ASSESSMENT & PLAN NOTE
2021    1. Scintigraphically negative for ischemia or infarct.  2. The global left ventricular systolic function is normal with an LV ejection fraction of greater than 75 % and no evidence of LV dilatation. Wall motion is normal.      No exertional chest discomfort  Anxiety could be contributing      Monitor Summary     SR-ST     Up to 166     .13/.07/.32

## 2023-07-24 NOTE — ASSESSMENT & PLAN NOTE
MSSA (methicillin susceptible Staphylococcus aureus) infection    Discussed nasal decolonization     Based on chart review / Patient has a reported history of Staph infection   In an attempt to prevent Surgical site infection , with the up coming surgery , suggest the following        Previous MSSA infection    Intra nasal Bactroban for 5 days (start  2 days pre op  )   IV Ancef (  If no allergy / Intolerance) ,  for surgeries that require systemic antibiotic to prevent surgical site infections    Instructions for use of  nasal Bactroban    Generic ointment or generic cream (30gram tube) - place a drop on a q tip , insert into to very tip of the nose only , then press on the nose gently to distribute.     Or Single use tube-1 gram dose - half tube in each nostril bid for 5 days .

## 2023-07-24 NOTE — ASSESSMENT & PLAN NOTE
April 2023     There is no evidence of intracardiac shunting.   The left ventricle is normal in size with normal systolic function.   The estimated ejection fraction is 65%.   Normal left ventricular diastolic function.   Normal right ventricular size with normal right ventricular systolic function.   The estimated PA systolic pressure is 42 mmHg.   Normal central venous pressure (3 mmHg).    Pulmonary arterial hypertension       WHO functional classification       Class 3- Moderate Limitation ( with reference to fatigue or dyspnea)      Clinical classification - based on etiology       Group 3       [place] heart association functional class       Class 2        Planned Anesthesia-  General      Increased risk of skip operative morbidity, mortality         Suggest avoidance of Intra vascular volume over load or reduced pre load       I suggest that the anaesthesiologist be aware of the Pulmonary artery hypertension , so that sedation,  anesthetic plans can be made with consideration of Pulmonary Hypertension  .

## 2023-07-24 NOTE — ASSESSMENT & PLAN NOTE
Hb about 10  Occasional rectal bleeding from straining  No marshall  No overt GI/ blood losses   Had Hematology evaluation - had IV Iron

## 2023-07-25 ENCOUNTER — TELEPHONE (OUTPATIENT)
Dept: INTERNAL MEDICINE | Facility: CLINIC | Age: 68
End: 2023-07-25
Payer: MEDICARE

## 2023-07-25 ENCOUNTER — OUTPATIENT CASE MANAGEMENT (OUTPATIENT)
Dept: ADMINISTRATIVE | Facility: OTHER | Age: 68
End: 2023-07-25
Payer: MEDICARE

## 2023-07-25 DIAGNOSIS — Z93.59 SUPRAPUBIC CATHETER: Primary | ICD-10-CM

## 2023-07-25 DIAGNOSIS — Z99.81 DEPENDENCE ON CONTINUOUS SUPPLEMENTAL OXYGEN: ICD-10-CM

## 2023-07-25 DIAGNOSIS — I89.0 LYMPHEDEMA: ICD-10-CM

## 2023-07-25 NOTE — TELEPHONE ENCOUNTER
----- Message from Josh Lawrence MA sent at 7/25/2023  2:10 PM CDT -----  Regarding: FW: Patient's home health and symptoms    ----- Message -----  From: Michelle Box RN  Sent: 7/25/2023   1:51 PM CDT  To: [facility] Staff  Subject: Patient's home health and symptoms               Good afternoon,    I just hung up the phone with this patient and she states she is having shortness of breath with exertion, coughing up green sputum that started last night, swelling more than usual to her legs with blisters, redness and weeping and states they are throbbing.  Her legs are not currently wrapped because she states that [facility] dropped her and she took it upon herself to take the wraps off after a couple of weeks of them being on.  Her weight is currently 213 and it was unclear to me if she recently gained any weight.  She was talking in full sentences without sounding short of breath to me.  She was seen in preop yesterday and was cleared for her Baclofen pump exchange surgery next week.  She is taking her Furosemide 80 mg BID as prescribed.  Her blood pressure yesterday was 90's/60, she does not have her pulse ox with her nor her oxygen.  Please advise.    Also, she needs a new home health set up for her suprapubic catheter to be changed every four weeks.  It was last changed on 7/20, the day that [facility] dropped her from home health.  Please advise.      If you have any questions please call me.    Thank you,    Kenya Box RN  RN Case Manager  Outpatient Care Management  [facility]  [phone]  [email or web address]

## 2023-07-25 NOTE — PROGRESS NOTES
Outpatient Care Management  Plan of Care Follow Up Visit    Patient: Tasha Hawley  MRN: [id number]  Date of Service: 07/25/2023  Completed by: Michelle Box RN  Referral Date: 04/17/2023    Reason for Visit   Patient presents with    OPCM RN Follow Up Call       Brief Summary: Patient states she is having shortness of breath with exertion, coughing up green sputum that started last night, swelling more than usual to her legs with blisters, redness and weeping and states they are throbbing.  Her legs are not currently wrapped because she states that [facility] dropped her and she took it upon herself to take the wraps off after a couple of weeks of them being on.  Her weight is currently 213 and it was unclear  if she recently gained any weight.  She was talking in full sentences without sounding short of breath.  She was seen in preop yesterday and was cleared for her Baclofen pump exchange surgery next week.  She is taking her Furosemide 80 mg BID as prescribed.  Her blood pressure yesterday was 90's/60, she does not have her pulse ox with her nor her oxygen.  She is out of town in [place] visiting one of her daughter's.  Patient states that the only signs and symptoms she knows of CHF is smother, short winded and dizzy.  Patient states she is currently eating pretty much a vegetarian diet.  She is eating again after the difficulty she had with her EGD.  She can't eat things like rice because it chokes her.  She states she cooks without salt.  She states she doesn't remember what Mom's Meals are when asked if she would like some post discharge meals.  She is still drinking her soft drinks like coke and 7-up.  She states she is trying to give them up and drink more Crystal Light pink lemonade.    CM messaged Dr. Hodges concerning patient symptoms listed above and the home health company dropping her.  CM requested Dr. Hodges to please set up a new home health company for patient.  Her  suprapubic catheter needs to be changed every four weeks, was last changed on 7/20.  CM reiterated the signs and symptoms of CHF to the patient including difficulty breathing or shortness of breath, especially with activity; persistent cough; swelling of feet, legs or abdomen; unexplained weight gain; fatigue; weakness; loss of appetite; dizziness and rapid heartbeat. CM ordered post discharge Mom's Meals on the Mom's Meals portal to be delivered on 7/28.  CM also reminded patient that soda's have some sodium in them that is unnecessary for her diet.  CM encouraged patient to continue to cook without salt and to try and give up her sodas.  Patient agrees to follow up phone call in four weeks.    Next steps:   Follow up phone call in four weeks  Follow up if weighing herself daily and logging  Follow up on 3 lb/5 lb rule  Follow up if eating processed meats  Follow up on low sodium diet  Follow up on S&S of CHF  Follow up if received Mom's Meals  Educate on VTE

## 2023-07-26 ENCOUNTER — DOCUMENT SCAN (OUTPATIENT)
Dept: HOME HEALTH SERVICES | Facility: HOSPITAL | Age: 68
End: 2023-07-26
Payer: MEDICARE

## 2023-07-27 ENCOUNTER — OUTPATIENT CASE MANAGEMENT (OUTPATIENT)
Dept: ADMINISTRATIVE | Facility: OTHER | Age: 68
End: 2023-07-27
Payer: MEDICARE

## 2023-07-27 ENCOUNTER — TELEPHONE (OUTPATIENT)
Dept: INTERNAL MEDICINE | Facility: CLINIC | Age: 68
End: 2023-07-27
Payer: MEDICARE

## 2023-07-27 NOTE — TELEPHONE ENCOUNTER
----- Message from Jaycee Bryson sent at 7/27/2023  1:09 PM CDT -----  Contact: Farzad @[facility] [phone]  Would like to receive medical advice.    Would they like a call back or a response via [facility]:  call back    Additional information:  Calling to verify if pt have current orders for wound care for home health referral. Farzad states if pt have problems with old catheter can the office contact  or pt previous urologist.

## 2023-07-27 NOTE — PROGRESS NOTES
Outpatient Care Management   - High Risk Patient Assessment    Patient: Tasha Hawley  MRN:  [id number]  Date of Service:  7/27/2023  Completed by:  Jaycee Caro LMSW  Referral Date: 04/17/2023    Reason for Visit   Patient presents with    Social Work Assessment - High Risk     7/27/2023       Brief Summary:  received a referral from OPCM REJI Box for the following patient identified psycho-social needs: home health set up. Pt referred to [facility] due to pt being dropped by [facility]. SW sent home health orders to [facility]-doesn't staff area, [facility]-left message, [facility]-can't staff, [facility]-location does not take [employer] yet, [facility]-can't staff, [facility]-left message, [facility] and [facility]-can't staff. All of these agencies listed as in network with pt's insurance plan. [facility] reported they may be able to take patient but need to look at orders. Sw completed social assessment with pt. She stated that [facility] called and scheduled admit for 9 AM tomorrow. SW reiterated that resources are limited within her area and insurance. Pt lives with her  and requires assistance with ADLs. She is getting meals from [employer]. She declined any issues paying for food or utilities at this time. Her  or friend brings her to MD appointments. Pt agreeable to follow up call in 1 week.    Care plan was created in collaboration with patient/caregiver input.  completed the SDOH questionnaire.

## 2023-07-27 NOTE — TELEPHONE ENCOUNTER
I spoke to Farzad.  Nurse to evaluate and suggest wound care regimen.  If there are problems with SP catheter, home health should talk to her urologist.

## 2023-07-27 NOTE — TELEPHONE ENCOUNTER
----- Message from Leonard López sent at 7/27/2023  6:48 AM CDT -----  Name Of Caller: Violeta fernandez with [employer]        Provider Name: Mesfin Hodges         Does patient feel the need to be seen today? no         Relationship to the Pt?: Violeta fernandez with [employer]        Contact Preference?: [phone]        What is the nature of the call?: Violeta fernandez with [employer] states that she needs to speak with someone in the office in regards to a peer to peer with medical director for a non invasive ventilator, the cut off for the offer is on Monday 7- by 12pm central standard time.

## 2023-07-28 ENCOUNTER — TELEPHONE (OUTPATIENT)
Dept: INTERNAL MEDICINE | Facility: CLINIC | Age: 68
End: 2023-07-28
Payer: MEDICARE

## 2023-07-28 PROCEDURE — G0180 MD CERTIFICATION HHA PATIENT: HCPCS | Mod: ,,, | Performed by: INTERNAL MEDICINE

## 2023-07-28 PROCEDURE — G0180 PR HOME HEALTH MD CERTIFICATION: ICD-10-PCS | Mod: ,,, | Performed by: INTERNAL MEDICINE

## 2023-07-28 NOTE — TELEPHONE ENCOUNTER
----- Message from Anais Lee sent at 7/28/2023  2:34 PM CDT -----  Contact: Dr. Neely  [phone]  Dr. Neely with [employer] called requesting a call back from dr. Hdoges

## 2023-07-31 ENCOUNTER — OFFICE VISIT (OUTPATIENT)
Dept: GASTROENTEROLOGY | Facility: CLINIC | Age: 68
End: 2023-07-31
Payer: MEDICARE

## 2023-07-31 DIAGNOSIS — R13.10 DYSPHAGIA, UNSPECIFIED TYPE: Primary | ICD-10-CM

## 2023-07-31 PROBLEM — J96.01 ACUTE RESPIRATORY FAILURE WITH HYPOXIA AND HYPERCARBIA: Status: RESOLVED | Noted: 2023-03-23 | Resolved: 2023-07-31

## 2023-07-31 PROBLEM — J96.02 ACUTE RESPIRATORY FAILURE WITH HYPOXIA AND HYPERCARBIA: Status: RESOLVED | Noted: 2023-03-23 | Resolved: 2023-07-31

## 2023-07-31 PROCEDURE — 1160F RVW MEDS BY RX/DR IN RCRD: CPT | Mod: CPTII,95,, | Performed by: INTERNAL MEDICINE

## 2023-07-31 PROCEDURE — 3044F HG A1C LEVEL LT 7.0%: CPT | Mod: CPTII,95,, | Performed by: INTERNAL MEDICINE

## 2023-07-31 PROCEDURE — 3044F PR MOST RECENT HEMOGLOBIN A1C LEVEL <7.0%: ICD-10-PCS | Mod: CPTII,95,, | Performed by: INTERNAL MEDICINE

## 2023-07-31 PROCEDURE — 1159F PR MEDICATION LIST DOCUMENTED IN MEDICAL RECORD: ICD-10-PCS | Mod: CPTII,95,, | Performed by: INTERNAL MEDICINE

## 2023-07-31 PROCEDURE — 99214 OFFICE O/P EST MOD 30 MIN: CPT | Mod: 95,,, | Performed by: INTERNAL MEDICINE

## 2023-07-31 PROCEDURE — 99214 PR OFFICE/OUTPT VISIT, EST, LEVL IV, 30-39 MIN: ICD-10-PCS | Mod: 95,,, | Performed by: INTERNAL MEDICINE

## 2023-07-31 PROCEDURE — 1159F MED LIST DOCD IN RCRD: CPT | Mod: CPTII,95,, | Performed by: INTERNAL MEDICINE

## 2023-07-31 PROCEDURE — 1160F PR REVIEW ALL MEDS BY PRESCRIBER/CLIN PHARMACIST DOCUMENTED: ICD-10-PCS | Mod: CPTII,95,, | Performed by: INTERNAL MEDICINE

## 2023-07-31 NOTE — PROGRESS NOTES
The patient location is:  At home  The chief complaint leading to consultation is:  Dysphagia    Visit type: audiovisual    Face to Face time with patient: 30 minutes of total time spent on the encounter, which includes face to face time and non-face to face time preparing to see the patient (eg, review of tests), Obtaining and/or reviewing separately obtained history, Documenting clinical information in the electronic or other health record, Independently interpreting results (not separately reported) and communicating results to the patient/family/caregiver, or Care coordination (not separately reported).         Each patient to whom he or she provides medical services by telemedicine is:  (1) informed of the relationship between the physician and patient and the respective role of any other health care provider with respect to management of the patient; and (2) notified that he or she may decline to receive medical services by telemedicine and may withdraw from such care at any time.    Notes:       [facility] Consultation Note    Reason for Consult:  The encounter diagnosis was Dysphagia, unspecified type.    PCP:   Mesfin Hodges Ii       Referring MD:  No referring provider defined for this encounter.    Initial History of Present Illness (HPI):  This is a 67 y.o. female here for evaluation of intermittent solid food patient had a recent EGD tortuous esophagus slightly a appearing loose Nissen fundoplication stomach biopsies were negative for H pylori nothing seemed narrowed or strictured that needed dilation patient has had some problems with her dentures just got him back in had something break on him so they are out for repair right now so this may be contributing to her dysphagia  thinks she puts weight too much food on a spoon or fork each time which may be contributing to her dysphagia will refer her to speech pathology for further evaluation will set her up for esophageal  manometry an esophagram with barium tablet for further evaluation she is currently would like to hold off on colonoscopy she may consider at next clinic visit she is not having any overt GI bleeding no melena no hematochezia no abdominal pain no weight loss she is actually gaining weight she says.  This visit was made possible by speaking with her  getting history and their daughter Jaycee Hawley.  They were having technical difficulties with the video visit so they are daughter had to help.    Abdominal pain - no  Reflux - no  Dysphagia -as above   Bowel habits - normal  GI bleeding - none  NSAID usage - none    Interval HPI 07/31/2023:  The patient's last visit with me was on 5/17/2023.      ROS:  Constitutional: No fevers, chills, No weight loss  ENT:  No heartburn intermittent solid food dysphagia no odynophagia no hoarseness  CV: No chest pain, no palpitation,  Pulm: No cough, No shortness of breath, no wheezing, pulmonary hypertension  Ophtho: No vision changes  GI: see HPI  Derm: No rash, no itching  Heme: No lymphadenopathy, No easy bruising  MSK:  Pain with a Dilaudid pump being replaced by Neurosurgery soon  : No dysuria, No hematuria, followed by uro Gyne for incontinence  Endo: No hot or cold intolerance  Neuro: No syncope, No seizure, no strokes  Psych: No uncontrolled anxiety, No uncontrolled depression    Medical History:  has a past medical history of Anxiety, Arthritis, Bilateral lower extremity edema, Carotid artery occlusion, Cataract, CHF (congestive heart failure), Coronary artery disease, Depression, Fever blister, Hard of hearing, Hypokalemia (01/09/2023), Hyponatremia (02/04/2022), Hypothyroid, Iron deficiency anemia, Lumbar radiculopathy, Ocular migraine, Other emphysema (05/22/2023), Renal disorder, and Sleep apnea.    Surgical History:  has a past surgical history that includes Hysterectomy (1975); Back surgery; pain pump placement; Spine surgery (5-13-13); Cataract extraction  w/  intraocular lens implant (Left); Spinal cord stimulator removal; Esophagogastroduodenoscopy (N/A, 5/23/2018); Tonsillectomy; Adenoidectomy; Cardiac catheterization (2016); Cystoscopic litholapaxy (N/A, 6/27/2019); Cystoscopic litholapaxy (N/A, 9/3/2019); Replacement of catheter (N/A, 10/31/2019); Cystoscopy (N/A, 7/13/2021); Injection of botulinum toxin type A (7/13/2021); Cystoscopy (11/16/2021); Injection of botulinum toxin type A (11/16/2021); Cystoscopy (7/19/2022); Cystoscopy with injection of periurethral bulking agent (7/19/2022); Injection of botulinum toxin type A (7/19/2022); Removal of bone spur of foot (Bilateral, 9/16/2022); cystoscopy,with botulinum toxin injection (N/A, 12/13/2022); insertion, suprapubic catheter (N/A, 12/13/2022); cystoscopy,with botulinum toxin injection (N/A, 3/28/2023); Cystoscopy with injection of periurethral bulking agent (N/A, 3/28/2023); and Esophagogastroduodenoscopy (N/A, 6/23/2023).    Family History: family history includes Cancer in her father; Cancer (age of onset: 55) in her mother; Cirrhosis in her paternal aunt and paternal uncle; Colon cancer in her maternal uncle; Esophageal cancer in her father; Heart disease in her maternal uncle; Liver disease in her paternal aunt and paternal uncle; No Known Problems in her brother, brother, maternal aunt, maternal grandfather, paternal grandfather, paternal grandmother, and sister; Parkinsonism in her maternal grandmother; Tremor in her maternal grandmother..     Social History:  reports that she has never smoked. She has never used smokeless tobacco. She reports current drug use. Drug: Marijuana. She reports that she does not drink alcohol.    Review of patient's allergies indicates:   Allergen Reactions    (d)-limonene flavor      Other reaction(s): difficult intubation  Other reaction(s): Difficulty breathing    Bactrim [sulfamethoxazole-trimethoprim] Anaphylaxis    Benadryl [diphenhydramine hcl] Shortness Of Breath     Fentanyl Itching, Nausea And Vomiting and Swelling             Imitrex [sumatriptan succinate] Shortness Of Breath    Percocet [oxycodone-acetaminophen] Shortness Of Breath    Topamax [topiramate] Shortness Of Breath    Vancomycin Anaphylaxis     Rash    Butorphanol tartrate     Darvocet a500 [propoxyphene n-acetaminophen]      Other reaction(s): Difficulty breathing    Evening primrose (oenothera biennis)     Lyrica [pregabalin] Other (See Comments)     tremors    White petrolatum-zinc     Zinc oxide-white petrolatum      Other reaction(s): Difficulty breathing    Latex, natural rubber Itching and Rash    Phenytoin Rash and Other (See Comments)     Trouble breathing       Medication List with Changes/Refills   Current Medications    ASPIRIN (ECOTRIN) 81 MG EC TABLET    Take 1 tablet (81 mg total) by mouth once daily.    ATORVASTATIN (LIPITOR) 80 MG TABLET    Take 1 tablet (80 mg total) by mouth once daily.    BUTALBITAL-ACETAMINOPHEN-CAFFEINE -40 MG (FIORICET, ESGIC) -40 MG PER TABLET    Take 1 tablet by mouth daily as needed.    CYANOCOBALAMIN, VITAMIN B-12, 5,000 MCG SUBL    Place 1 tablet under the tongue once daily.    DOCUSATE SODIUM (COLACE) 100 MG CAPSULE    Take 200 mg by mouth every evening.    FERROUS GLUCONATE (FERGON) 324 MG TABLET    Take 1 tablet (324 mg total) by mouth daily with breakfast.    FLUDROCORTISONE (FLORINEF) 0.1 MG TAB    Take a half-tablet (50 mcg) by mouth once daily    FLUOXETINE 20 MG CAPSULE    Take 3 capsules (60 mg total) by mouth once daily.    FLUTICASONE PROPIONATE (FLONASE) 50 MCG/ACTUATION NASAL SPRAY    2 sprays (100 mcg total) by Each Nostril route once daily.    FUROSEMIDE (LASIX) 40 MG TABLET    Take 1 tablet (40 mg total) by mouth 2 (two) times a day.    HYDROCODONE-ACETAMINOPHEN (NORCO)  MG PER TABLET    Take 1 tablet by mouth every 6 (six) hours as needed for breakthrough pain.    HYDROCORTISONE (CORTEF) 10 MG TAB    Take 1 tablet (10 mg total) by  mouth every evening.    HYDROXYZINE (ATARAX) 50 MG TABLET    Take 1 tablet (50 mg total) by mouth every 4 (four) hours as needed for Anxiety.    INTRATHECAL PAIN PUMP COMPOUND    Hydromorphone (7.5 mg/mL) infusion at 8.59 mg/day (0.3578 mg/hr) out of a total reservoir volume of 40 mL  Pump filled monthly    KETOROLAC (TORADOL) 10 MG TABLET    Take 10 mg by mouth daily as needed.    KETOROLAC (TORADOL) 30 MG/ML (1 ML) INJECTION    Inject 30 mg into the vein once.    LEVOTHYROXINE (SYNTHROID) 150 MCG TABLET    Take 1 tablet (150 mcg total) by mouth before breakfast.    LIDOCAINE (LIDODERM) 5 %    Place 1 patch onto the skin once daily. Remove & Discard patch within 12 hours or as directed by MD    LIOTHYRONINE (CYTOMEL) 25 MCG TAB    Take 1 tablet (25 mcg total) by mouth once daily.    LOPERAMIDE HCL (IMODIUM A-D ORAL)    Take by mouth as needed. Diarrhea    NITROGLYCERIN (NITROSTAT) 0.4 MG SL TABLET    Place 0.4 mg under the tongue every 5 (five) minutes as needed for Chest pain.    ONDANSETRON (ZOFRAN-ODT) 4 MG TBDL    Take 4 mg by mouth every 4 to 6 hours as needed.    PANTOPRAZOLE (PROTONIX) 40 MG TABLET    Take 1 tablet (40 mg total) by mouth once daily.    POTASSIUM CHLORIDE (MICRO-K) 10 MEQ CPSR    Take 2 capsules (20 mEq total) by mouth once daily.    PROMETHAZINE (PHENERGAN) 25 MG TABLET    Take 25 mg by mouth every 6 (six) hours as needed for Nausea.    QUETIAPINE (SEROQUEL) 100 MG TAB    TAKE 2 TABLETS (200 MG) BY MOUTH NIGHTLY    SENNA (SENNA LAX) 8.6 MG TABLET    Take 2 tablets by mouth 2 (two) times daily.    TIOTROPIUM BROMIDE (SPIRIVA RESPIMAT) 2.5 MCG/ACTUATION INHALER    Inhale 2 puffs into the lungs once daily. Controller    TIZANIDINE (ZANAFLEX) 4 MG TABLET    Take 4 mg by mouth 2 (two) times daily as needed.    TRAZODONE (DESYREL) 300 MG TABLET    Take 1 tablet (300 mg total) by mouth every evening.   Discontinued Medications    ALBUTEROL-IPRATROPIUM (DUO-NEB) 2.5 MG-0.5 MG/3 ML NEBULIZER SOLUTION     Take 3 mLs by nebulization every 6 (six) hours as needed for Wheezing or Shortness of Breath. Rescue         Objective Findings:    Vital Signs:  LMP  (LMP Unknown)   There is no height or weight on file to calculate BMI.    Physical Exam:  Neurologic:  Alert and oriented x4  Psychiatric:  Normal speech mentation and affect    Labs:  Lab Results   Component Value Date    WBC 4.97 07/18/2023    HGB 11.2 (L) 07/24/2023    HCT 35.9 (L) 07/18/2023     07/18/2023    CHOL 122 05/22/2023    TRIG 73 05/22/2023    HDL 47 05/22/2023    ALT 9 (L) 07/18/2023    AST 14 07/18/2023     07/18/2023    K 3.1 (L) 07/18/2023     07/18/2023    CREATININE 0.8 07/18/2023    BUN 8 07/18/2023    CO2 28 07/18/2023    TSH <0.015 (L) 07/03/2023    INR 0.9 07/06/2023    HGBA1C 5.2 07/09/2023             Medical Decision Making:  Lab work reviewed  Patient is getting IV iron  Currently not interested in colonoscopy but she may consider it restricted diet and bowel prep needed for colonoscopy  Esophageal manometry talk given  Referral to speech pathology talk given  Esophagram with barium tablet talk given  The Olympus scope GIF-                          ([id number]) was introduced through the mouth, and                          advanced to the second part of duodenum.   Findings:        The examined esophagus was moderately tortuous.        A small hiatal hernia was present.        Evidence of a Nissen fundoplication was found in the cardia. The        wrap appeared loose. This was traversed.        The exam of the stomach was otherwise normal.        Biopsies were taken with a cold forceps in the gastric body and in        the gastric antrum for histology.        The examined duodenum was normal.   Impression:            - Tortuous esophagus.                          - Small hiatal hernia.                          - A Nissen fundoplication was found. The wrap                          appears loose.                           - Normal examined duodenum.                          - Biopsies were taken with a cold forceps for                          histology in the gastric body and in the gastric                          antrum.   Recommendation:        - Discharge patient to home.                          - The findings and recommendations were discussed                          with the patient.                          - Await pathology results.                          - Patient has a contact number available for                          emergencies. The signs and symptoms of potential                          delayed complications were discussed with the                          patient. Return to normal activities tomorrow.                          Written discharge instructions were provided to                          the patient.                          - Consider barium swallow to assess if                          fundoplication is intact   Attending Participation:        I personally performed the entire procedure.   Juaquin Barry MD   6/23/2023   Stomach, biopsy:   Antral and oxyntic mucosa with mild chronic inflammation.     No H.pylori identified on H&E stain slide.     No intestinal metaplasia or dysplasia.    Comment: Interp By Kalpesh Montano M.D., Signed on 06/27/2023      Assessment:  1. Dysphagia, unspecified type         Recommendations:  1. Esophagram with barium tablet  2. Referral for high-resolution esophageal manometry  3. Referral to speech pathology for evaluation of dysphagia  4. Return to GI clinic 8 weeks for follow-up okay for telemedicine video visit     No follow-ups on file.      Order summary:  Orders Placed This Encounter    [place] Esophagram With Barium Tablet    Ambulatory referral/consult to Speech Therapy         Thank you so much for allowing me to participate in the care of Tasha Vance MD    DISCLAIMER: This note was prepared with MModal voice recognition  transcription software. Garbled syntax, mangled or inadvertent pronouns, and other bizarre constructions may be attributed to that software system. While efforts were made to correct any mistakes made by this voice recognition program, some errors and/or omissions may remain in the note that were missed when the note was originally created.

## 2023-07-31 NOTE — PATIENT INSTRUCTIONS
"Procedure:  Esophageal manometry    Diagnosis: Dysphagia    Procedure Timin-8 weeks    *If within 4 weeks selected, please shyla as high priority*    *If greater than 12 weeks, please select "5-12 weeks" and delay sending until 2 months prior to requested date*    Provider: Dr. Myles Ribeiro    Location: [address]    Additional Scheduling Information: No scheduling concerns    Prep Specifications:Standard prep    Have you attached a patient to this message: yes   "

## 2023-08-01 ENCOUNTER — TELEPHONE (OUTPATIENT)
Dept: ENDOSCOPY | Facility: HOSPITAL | Age: 68
End: 2023-08-01
Payer: MEDICARE

## 2023-08-01 ENCOUNTER — ANESTHESIA EVENT (OUTPATIENT)
Dept: SURGERY | Facility: HOSPITAL | Age: 68
End: 2023-08-01
Payer: MEDICARE

## 2023-08-01 VITALS — BODY MASS INDEX: 36.7 KG/M2 | HEIGHT: 64 IN | WEIGHT: 215 LBS

## 2023-08-01 DIAGNOSIS — R13.10 DYSPHAGIA, UNSPECIFIED TYPE: Primary | ICD-10-CM

## 2023-08-01 NOTE — TELEPHONE ENCOUNTER
"  From: Prince Vance MD   Sent: 2023   6:38 PM CDT   To: [facility]     Procedure:  Esophageal manometry     Diagnosis: Dysphagia     Procedure Timin-8 weeks     *If within 4 weeks selected, please shyla as high priority*     *If greater than 12 weeks, please select "5-12 weeks" and delay sending until 2 months prior to requested date*     Provider: Dr. Myles Ribeiro     Location: [address]     Additional Scheduling Information: No scheduling concerns     Prep Specifications:Standard prep     Have you attached a patient to this message: yes     "

## 2023-08-01 NOTE — TELEPHONE ENCOUNTER
Spoke to pt's daughter Jaycee, to schedule procedure(s) Esophageal Manometry       Physician to perform procedure(s) Dr. NIEVES Ribeiro  Date of Procedure (s) 10/9/23  Arrival Time 7:00 AM  Time of Procedure(s) 8:00 AM   Location of Procedure(s) [address]  Type of Rx Prep sent to patient: N/A  Instructions provided to patient via [facility]    Patient was informed on the following information and verbalized understanding. Screening questionnaire reviewed with patient and complete. If procedure requires anesthesia, a responsible adult needs to be present to accompany the patient home, patient cannot drive after receiving anesthesia. Appointment details are tentative, especially check-in time. Patient will receive a prep-op call 4 days prior to confirm check-in time for procedure. If applicable the patient should contact their pharmacy to verify Rx for procedure prep is ready for pick-up. Patient was advised to call the scheduling department at [phone] if pharmacy states no Rx is available. Patient was advised to call the endoscopy scheduling department if any questions or concerns arise.      [facility] Scheduling Department

## 2023-08-01 NOTE — ANESTHESIA PREPROCEDURE EVALUATION
08/01/2023  Tasha Hawley is a 67 y.o., female.      2023   There is no evidence of intracardiac shunting.   The left ventricle is normal in size with normal systolic function.   The estimated ejection fraction is 65%.   Normal left ventricular diastolic function.   Normal right ventricular size with normal right ventricular systolic function.   The estimated PA systolic pressure is 42 mmHg.   Normal central venous pressure (3 mmHg).    Patient Active Problem List   Diagnosis    Generalized anxiety disorder    Sleep apnea    Iron deficiency anemia    Major depressive disorder, recurrent, mild    Chronic pain syndrome    Cervical myelopathy    SOB (shortness of breath)    Narcotic dependency, continuous    Congestive heart failure    Thoracic aorta atherosclerosis    Drug-induced constipation    GERD (gastroesophageal reflux disease)    History of stress test    Neurogenic bladder    Frequent falls    Hypothyroidism (acquired)    Lymphedema    Scoliosis deformity of spine    S/P insertion of intrathecal pump    Paresthesia of both lower extremities    Degenerative disc disease, lumbar    Osteoarthritis of spine with radiculopathy, lumbar region    Bradycardia    Bilateral carotid artery disease    Insomnia due to medical condition    Suprapubic catheter    Esophageal dysphagia    Recurrent UTI    Mixed stress and urge urinary incontinence    Inflammatory spondylopathy of lumbar region    Pure hypercholesterolemia    Chronic diastolic heart failure    Constipation due to opioid therapy    Leg pain, bilateral    Physical deconditioning    Tremor    Anxiety    Calcaneal spur of left foot    Charcot ankle, unspecified laterality    Hypokalemia    Anemia    Preoperative respiratory examination    BMI 36.0-36.9,adult    Stage 3a chronic kidney disease     Intractable pain    Chronic systolic heart failure    Chronic respiratory failure with hypoxia and hypercapnia    Pulmonary hypertension due to diastolic systemic ventricular dysfunction    Pulmonary air trapping    Migraine    Hypothyroid    History of staph infection    Chronic, continuous use of opioids     Past Surgical History:   Procedure Laterality Date    ADENOIDECTOMY      BACK SURGERY      x 12    CARDIAC CATHETERIZATION  2016    subtotalled LAD with right to left collaterals    CATARACT EXTRACTION W/  INTRAOCULAR LENS IMPLANT Left     Dr Coleman     CYSTOSCOPIC LITHOLAPAXY N/A 6/27/2019    Procedure: CYSTOLITHOLAPAXY;  Surgeon: Shireen Mayo MD;  Location: [facility] OR 2ND FLR;  Service: Urology;  Laterality: N/A;    CYSTOSCOPIC LITHOLAPAXY N/A 9/3/2019    Procedure: CYSTOLITHOLAPAXY;  Surgeon: Shireen Mayo MD;  [facility];  Service: Urology;  Laterality: N/A;    CYSTOSCOPY N/A 7/13/2021    Procedure: CYSTOSCOPY;  Surgeon: Shireen Mayo MD;  [facility];  Service: Urology;  Laterality: N/A;    CYSTOSCOPY  11/16/2021    Procedure: CYSTOSCOPY;  Surgeon: Shireen Mayo MD;  [facility];  Service: Urology;;    CYSTOSCOPY  7/19/2022    Procedure: CYSTOSCOPY;  Surgeon: Shireen Mayo MD;  [facility];  Service: Urology;;    CYSTOSCOPY WITH INJECTION OF PERIURETHRAL BULKING AGENT  7/19/2022    Procedure: CYSTOSCOPY, WITH PERIURETHRAL BULKING AGENT INJECTION-MACROPLASTIQUE;  Surgeon: Shireen Mayo MD;  Location: [facility] OR 1ST FLR;  Service: Urology;;    CYSTOSCOPY WITH INJECTION OF PERIURETHRAL BULKING AGENT N/A 3/28/2023    Procedure: CYSTOSCOPY, WITH PERIURETHRAL BULKING AGENT INJECTION;  Surgeon: Shireen Mayo MD;  [facility];  Service: Urology;  Laterality: N/A;  [place]    CYSTOSCOPY,WITH BOTULINUM TOXIN INJECTION N/A 12/13/2022    Procedure: CYSTOSCOPY,WITH BOTULINUM TOXIN INJECTION;  Surgeon:  Shireen Mayo MD;  Location: [facility] OR 1ST FLR;  Service: Urology;  Laterality: N/A;  300 U    CYSTOSCOPY,WITH BOTULINUM TOXIN INJECTION N/A 3/28/2023    Procedure: CYSTOSCOPY,WITH BOTULINUM TOXIN INJECTION;  Surgeon: Shireen Mayo MD;  Location: [facility] OR 1ST FLR;  Service: Urology;  Laterality: N/A;  45 MIN.    300 UNITS    ESOPHAGOGASTRODUODENOSCOPY N/A 5/23/2018    Procedure: ESOPHAGOGASTRODUODENOSCOPY (EGD);  Surgeon: Prince Vance MD;  Location: [facility] ENDO (4TH FLR);  Service: Endoscopy;  Laterality: N/A;  r/s 'd per Dr. Vance due to family emergency- ER    ESOPHAGOGASTRODUODENOSCOPY N/A 6/23/2023    Procedure: EGD (ESOPHAGOGASTRODUODENOSCOPY);  Surgeon: Juaquin Barry MD;  Location: [facility] ENDO (2ND FLR);  Service: Endoscopy;  Laterality: N/A;  Refferal: PRINCE VANCE  Order  tele enc 5/18/23  dx: history of a Nissen fundoplication  Additional Scheduling Information:  On home oxygen 2nd floor for airway protection also with a pain pump elevated BMI close to 40   Prep Gastroparesis   ins    HYSTERECTOMY  1975    endometriosis    INJECTION OF BOTULINUM TOXIN TYPE A  7/13/2021    Procedure: INJECTION, BOTULINUM TOXIN, 200units;  Surgeon: Shireen Mayo MD;  [facility];  Service: Urology;;    INJECTION OF BOTULINUM TOXIN TYPE A  11/16/2021    Procedure: INJECTION, BOTULINUM TOXIN, 200units;  Surgeon: Shireen Mayo MD;  [facility];  Service: Urology;;    INJECTION OF BOTULINUM TOXIN TYPE A  7/19/2022    Procedure: INJECTION, BOTULINUM TOXIN, 300 units ;  Surgeon: Shireen Mayo MD;  [facility];  Service: Urology;;    INSERTION, SUPRAPUBIC CATHETER N/A 12/13/2022    Procedure: INSERTION, SUPRAPUBIC CATHETER;  Surgeon: Shireen Mayo MD;  [facility];  Service: Urology;  Laterality: N/A;  exchange    pain pump placement      SQ Dilaudid Pump managed by Dr. Hillman, Pain Management    REMOVAL OF BONE SPUR OF FOOT  Bilateral 9/16/2022    Procedure: EXCISION ARTHRITIC BONE, BILATERAL FOOT;  Surgeon: Adam Mcguire DPM;  Location[facility];  Service: Podiatry;  Laterality: Bilateral;    REPLACEMENT OF CATHETER N/A 10/31/2019    Procedure: REPLACEMENT, CATHETER-SUPRAPUBIC;  Surgeon: Shireen Mayo MD;  Location: [address];  Service: Urology;  Laterality: N/A;    SPINAL CORD STIMULATOR REMOVAL      before [place]    SPINE SURGERY  5-13-13    CERVICAL FUSION    TONSILLECTOMY         Pre-op Assessment    I have reviewed the Patient Summary Reports.     I have reviewed the Nursing Notes. I have reviewed the NPO Status.      Review of Systems  Anesthesia Hx:  No problems with previous Anesthesia  History of prior surgery of interest to airway management or planning: cervical fusion. Previous anesthesia: MAC  6/23/23-EGD with MAC.  Procedure performed at an [facility]. Denies Family Hx of Anesthesia complications.   Denies Personal Hx of Anesthesia complications.   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  Dependence on O2 is PRN with activity  Chronic Obstructive Pulmonary Disease (COPD): Emphysema Hospitalization required in the past year. Inhaler use is maintenance inhaler daily and maintenance inhaler PRN.  Obstructive Sleep Apnea (CECI), CPAP prescribed.   Renal/:   Chronic Renal Disease, CKD   "Devices/Procedures/Surgery, suprapubic catheter. Kidney Function/Disease, Chronic Kidney Disease (CKD) , CKD Stage III (GFR 30-59)    Hepatic/GI:   GERD NEUROMUSCULAR DYSFUNCTION OF THE BLADDER   Musculoskeletal:   BACK SURGERIES X 12, SEVERAL LUMBAR SURGERIES AND H/O CERVICAL FUSION, CHRONIC NECK/BACK PAIN Joint Disease:  Arthritis, Osteoarthritis  Cervical Spine Disorder, S/P Cervical Fusion    Neurological:   LUMBAR RADICULOPATHY Dx of Headaches, Migraine Headache Pain Syndrome  Chronic Pain Syndrome Osteoarthritis    Endocrine:   Hypothyroidism  Obesity / BMI > 30  Psych:   anxiety depression          Physical Exam  General: Well nourished    Airway:  Mouth Opening: Normal  TM Distance: Normal  Tongue: Normal  Neck ROM: Extension Decreased, Flexion Decreased, Right Lateral Motion Decreased, Left Lateral Motion Decreased    Dental:  Edentulous    Chest/Lungs:  Clear to auscultation  Decreased Breath Sounds: left and right  PATIENT REPORTS "CHEST TIGHTNESS"  Skin:  Erythema  REDNESS AND SEVERE, CHRONIC LYMPHEDEMA TO BILATERAL L.E.        Anesthesia Assessment: Preoperative EQUATION    Planned Procedure: Procedure(s) (LRB):  REPLACEMENT, BACLOFEN PUMP (N/A)  Requested Anesthesia Type:General  Surgeon: Smitha Condon MD  Service: Neurosurgery  Known or anticipated Date of Surgery:7/12/2023    Surgeon notes: reviewed    Electronic QUestionnaire Assessment completed via nurse interview with patient.        Triage considerations:       Previous anesthesia records:MAC and No problems  6/23/2023    EGD (ESOPHAGOGASTRODUODENOSCOPY)    Airway:  Mallampati: II   Mouth Opening: Normal  TM Distance: Normal  Tongue: Normal  Neck ROM: Normal ROM    Last PCP note: within 1 month , within [facility]   Subspecialty notes: Allergy, Dermatology, Endocrinology, Gastroenterology, Neurosurgery, PODIATRY    Other important co-morbidities: PER Epic: CHF, GERD, HLD, Hypothyroid, Obesity, CECI, and CHRONIC PAIN SYNDROME, H/O STROKE, EMPHYSEMA, " CAD, CKD, ANEMIA       Tests already available:  Available tests,  within 1 month , within 3 months , within [facility] .   7/3/2023 TSH, FT4, 5/30/2023  MG, BMP, 5/29/2023 CBC, 5/27/2023 EKG          Instructions given. (See in Nurse's note)    Optimization:  Anesthesia Preop Clinic Assessment  Indicated    Medical Opinion Indicated           Plan:    Testing:  PT/INR, PTT, T&S, and UA   Pre-anesthesia  visit       Visit focus: concerns in complex and/or prolonged anesthesia, COMORBIDITIES     Consultation:Patient's PCP for re-evaluation OR STATEMENT OF MEDICAL CLEARANCE     Patient  has previously scheduled Medical Appointment:NONE    Navigation: Tests Scheduled. TBD             Consults scheduled.TBD             Results will be tracked by Preop Clinic.  Dasha Russell RN BSN    7/24/23-Patient is optimized  Gloria Ortiz MD  Internal Medicine  [facility]   Cell Phone- [phone]         Anesthesia Plan  Type of Anesthesia, risks & benefits discussed:    Anesthesia Type: Gen ETT, Gen Natural Airway  Intra-op Monitoring Plan: Standard ASA Monitors  Post Op Pain Control Plan: multimodal analgesia  Induction:  IV  Airway Plan: Direct  Informed Consent: Informed consent signed with the Patient and all parties understand the risks and agree with anesthesia plan.  All questions answered.   ASA Score: 3  Day of Surgery Review of History & Physical: H&P Update referred to the surgeon/provider.    Ready For Surgery From Anesthesia Perspective.       .

## 2023-08-02 ENCOUNTER — HOSPITAL ENCOUNTER (OUTPATIENT)
Facility: HOSPITAL | Age: 68
Discharge: HOME | End: 2023-08-02
Attending: NEUROLOGICAL SURGERY | Admitting: NEUROLOGICAL SURGERY
Payer: MEDICARE

## 2023-08-02 ENCOUNTER — ANESTHESIA (OUTPATIENT)
Dept: SURGERY | Facility: HOSPITAL | Age: 68
End: 2023-08-02
Payer: MEDICARE

## 2023-08-02 VITALS
DIASTOLIC BLOOD PRESSURE: 59 MMHG | RESPIRATION RATE: 15 BRPM | SYSTOLIC BLOOD PRESSURE: 120 MMHG | HEART RATE: 85 BPM | TEMPERATURE: 99 F | BODY MASS INDEX: 36.7 KG/M2 | OXYGEN SATURATION: 94 % | WEIGHT: 214.94 LBS | HEIGHT: 64 IN

## 2023-08-02 DIAGNOSIS — G89.29 CHRONIC PAIN: ICD-10-CM

## 2023-08-02 LAB
APTT PPP: 31.2 SEC (ref 21–32)
INR PPP: 1.9 (ref 0.8–1.2)
PROTHROMBIN TIME: 19.2 SEC (ref 9–12.5)

## 2023-08-02 PROCEDURE — D9220A PRA ANESTHESIA: Mod: CRNA,,, | Performed by: NURSE ANESTHETIST, CERTIFIED REGISTERED

## 2023-08-02 PROCEDURE — 36000706: Performed by: NEUROLOGICAL SURGERY

## 2023-08-02 PROCEDURE — C1729 CATH, DRAINAGE: HCPCS | Performed by: NEUROLOGICAL SURGERY

## 2023-08-02 PROCEDURE — 25000003 PHARM REV CODE 250: Performed by: NURSE ANESTHETIST, CERTIFIED REGISTERED

## 2023-08-02 PROCEDURE — 71000044 HC DOSC ROUTINE RECOVERY FIRST HOUR: Performed by: NEUROLOGICAL SURGERY

## 2023-08-02 PROCEDURE — 37000009 HC ANESTHESIA EA ADD 15 MINS: Performed by: NEUROLOGICAL SURGERY

## 2023-08-02 PROCEDURE — 85730 THROMBOPLASTIN TIME PARTIAL: CPT | Performed by: STUDENT IN AN ORGANIZED HEALTH CARE EDUCATION/TRAINING PROGRAM

## 2023-08-02 PROCEDURE — 27201423 OPTIME MED/SURG SUP & DEVICES STERILE SUPPLY: Performed by: NEUROLOGICAL SURGERY

## 2023-08-02 PROCEDURE — 63600175 PHARM REV CODE 636 W HCPCS: Performed by: NURSE ANESTHETIST, CERTIFIED REGISTERED

## 2023-08-02 PROCEDURE — 25000003 PHARM REV CODE 250: Performed by: NEUROLOGICAL SURGERY

## 2023-08-02 PROCEDURE — 85610 PROTHROMBIN TIME: CPT | Performed by: STUDENT IN AN ORGANIZED HEALTH CARE EDUCATION/TRAINING PROGRAM

## 2023-08-02 PROCEDURE — 62350 PR IMP SPINAL CANAL CATH: ICD-10-PCS | Mod: ,,, | Performed by: NEUROLOGICAL SURGERY

## 2023-08-02 PROCEDURE — 62350 IMPLANT SPINAL CANAL CATH: CPT | Mod: ,,, | Performed by: NEUROLOGICAL SURGERY

## 2023-08-02 PROCEDURE — 37000008 HC ANESTHESIA 1ST 15 MINUTES: Performed by: NEUROLOGICAL SURGERY

## 2023-08-02 PROCEDURE — 36000707: Performed by: NEUROLOGICAL SURGERY

## 2023-08-02 PROCEDURE — C1772 INFUSION PUMP, PROGRAMMABLE: HCPCS | Performed by: NEUROLOGICAL SURGERY

## 2023-08-02 PROCEDURE — 71000015 HC POSTOP RECOV 1ST HR: Performed by: NEUROLOGICAL SURGERY

## 2023-08-02 PROCEDURE — 62362 IMPLANT SPINE INFUSION PUMP: CPT | Mod: 51,,, | Performed by: NEUROLOGICAL SURGERY

## 2023-08-02 PROCEDURE — D9220A PRA ANESTHESIA: ICD-10-PCS | Mod: ANES,,, | Performed by: ANESTHESIOLOGY

## 2023-08-02 PROCEDURE — D9220A PRA ANESTHESIA: ICD-10-PCS | Mod: CRNA,,, | Performed by: NURSE ANESTHETIST, CERTIFIED REGISTERED

## 2023-08-02 PROCEDURE — 63600175 PHARM REV CODE 636 W HCPCS: Performed by: NEUROLOGICAL SURGERY

## 2023-08-02 PROCEDURE — 25000003 PHARM REV CODE 250: Performed by: STUDENT IN AN ORGANIZED HEALTH CARE EDUCATION/TRAINING PROGRAM

## 2023-08-02 PROCEDURE — D9220A PRA ANESTHESIA: Mod: ANES,,, | Performed by: ANESTHESIOLOGY

## 2023-08-02 PROCEDURE — 71000016 HC POSTOP RECOV ADDL HR: Performed by: NEUROLOGICAL SURGERY

## 2023-08-02 PROCEDURE — 62362 PR INSERT/ REPLACE INFUSN PUMP,PROGRAMMABLE: ICD-10-PCS | Mod: 51,,, | Performed by: NEUROLOGICAL SURGERY

## 2023-08-02 DEVICE — PUMP PAIN CNTRL INF 40ML: Type: IMPLANTABLE DEVICE | Site: ABDOMEN | Status: FUNCTIONAL

## 2023-08-02 RX ORDER — NYSTATIN 100000 [USP'U]/G
POWDER TOPICAL 4 TIMES DAILY
Qty: 60 G | Refills: 0 | Status: ON HOLD | OUTPATIENT
Start: 2023-08-02 | End: 2024-02-19 | Stop reason: HOSPADM

## 2023-08-02 RX ORDER — KETAMINE HCL IN 0.9 % NACL 50 MG/5 ML
SYRINGE (ML) INTRAVENOUS
Status: DISCONTINUED | OUTPATIENT
Start: 2023-08-02 | End: 2023-08-02

## 2023-08-02 RX ORDER — CEFAZOLIN SODIUM 1 G/3ML
INJECTION, POWDER, FOR SOLUTION INTRAMUSCULAR; INTRAVENOUS
Status: DISCONTINUED | OUTPATIENT
Start: 2023-08-02 | End: 2023-08-02

## 2023-08-02 RX ORDER — ONDANSETRON 2 MG/ML
INJECTION INTRAMUSCULAR; INTRAVENOUS
Status: DISCONTINUED | OUTPATIENT
Start: 2023-08-02 | End: 2023-08-02

## 2023-08-02 RX ORDER — HALOPERIDOL 5 MG/ML
0.5 INJECTION INTRAMUSCULAR EVERY 10 MIN PRN
Status: DISCONTINUED | OUTPATIENT
Start: 2023-08-02 | End: 2023-08-02 | Stop reason: HOSPADM

## 2023-08-02 RX ORDER — LIDOCAINE HYDROCHLORIDE AND EPINEPHRINE 10; 10 MG/ML; UG/ML
INJECTION, SOLUTION INFILTRATION; PERINEURAL
Status: DISCONTINUED | OUTPATIENT
Start: 2023-08-02 | End: 2023-08-02 | Stop reason: HOSPADM

## 2023-08-02 RX ORDER — PHENYLEPHRINE HCL IN 0.9% NACL 1 MG/10 ML
SYRINGE (ML) INTRAVENOUS
Status: DISCONTINUED | OUTPATIENT
Start: 2023-08-02 | End: 2023-08-02

## 2023-08-02 RX ORDER — PROPOFOL 10 MG/ML
VIAL (ML) INTRAVENOUS CONTINUOUS PRN
Status: DISCONTINUED | OUTPATIENT
Start: 2023-08-02 | End: 2023-08-02

## 2023-08-02 RX ORDER — MUPIROCIN 20 MG/G
OINTMENT TOPICAL
Status: DISCONTINUED | OUTPATIENT
Start: 2023-08-02 | End: 2023-08-02 | Stop reason: HOSPADM

## 2023-08-02 RX ORDER — CEPHALEXIN 500 MG/1
500 CAPSULE ORAL 4 TIMES DAILY
Qty: 20 CAPSULE | Refills: 0 | Status: SHIPPED | OUTPATIENT
Start: 2023-08-02 | End: 2023-08-07

## 2023-08-02 RX ORDER — CEFTRIAXONE 1 G/1
INJECTION, POWDER, FOR SOLUTION INTRAMUSCULAR; INTRAVENOUS
Status: DISCONTINUED | OUTPATIENT
Start: 2023-08-02 | End: 2023-08-02 | Stop reason: HOSPADM

## 2023-08-02 RX ORDER — SODIUM CHLORIDE 0.9 % (FLUSH) 0.9 %
10 SYRINGE (ML) INJECTION
Status: DISCONTINUED | OUTPATIENT
Start: 2023-08-02 | End: 2023-08-02 | Stop reason: HOSPADM

## 2023-08-02 RX ORDER — MUPIROCIN 20 MG/G
1 OINTMENT TOPICAL 2 TIMES DAILY
Status: DISCONTINUED | OUTPATIENT
Start: 2023-08-02 | End: 2023-08-02 | Stop reason: HOSPADM

## 2023-08-02 RX ORDER — PROPOFOL 10 MG/ML
VIAL (ML) INTRAVENOUS
Status: DISCONTINUED | OUTPATIENT
Start: 2023-08-02 | End: 2023-08-02

## 2023-08-02 RX ORDER — LIDOCAINE HYDROCHLORIDE 10 MG/ML
INJECTION, SOLUTION INTRAVENOUS
Status: DISCONTINUED | OUTPATIENT
Start: 2023-08-02 | End: 2023-08-02

## 2023-08-02 RX ADMIN — PROPOFOL 20 MG: 10 INJECTION, EMULSION INTRAVENOUS at 08:08

## 2023-08-02 RX ADMIN — Medication 10 MG: at 08:08

## 2023-08-02 RX ADMIN — Medication 100 MCG: at 09:08

## 2023-08-02 RX ADMIN — PROPOFOL 50 MCG/KG/MIN: 10 INJECTION, EMULSION INTRAVENOUS at 08:08

## 2023-08-02 RX ADMIN — Medication 100 MCG: at 08:08

## 2023-08-02 RX ADMIN — LIDOCAINE HYDROCHLORIDE 50 MG: 10 INJECTION, SOLUTION INTRAVENOUS at 08:08

## 2023-08-02 RX ADMIN — SODIUM CHLORIDE: 9 INJECTION, SOLUTION INTRAVENOUS at 08:08

## 2023-08-02 RX ADMIN — ONDANSETRON 4 MG: 2 INJECTION INTRAMUSCULAR; INTRAVENOUS at 09:08

## 2023-08-02 RX ADMIN — CEFAZOLIN 2 G: 330 INJECTION, POWDER, FOR SOLUTION INTRAMUSCULAR; INTRAVENOUS at 08:08

## 2023-08-02 RX ADMIN — Medication 200 MCG: at 08:08

## 2023-08-02 RX ADMIN — MUPIROCIN: 20 OINTMENT TOPICAL at 07:08

## 2023-08-02 RX ADMIN — Medication 200 MCG: at 09:08

## 2023-08-02 NOTE — PLAN OF CARE
Patient discharged to home via wheelchair, escorted by transport.  Pt alert and talkative, vitals stable, tolerating PO intake. Discharge instructions (written and verbal) and follow-up information given to patient and daughter who verbalized understanding, as well as a readiness for discharge. [facility] contact info provided for additional questions following discharge. EDGAR

## 2023-08-02 NOTE — TELEPHONE ENCOUNTER
"Sandee Evans  was seen as a follow up.    CHIEF COMPLAINT:    Chief Complaint   Patient presents with    Breathing Problem     Patient states that she is having a hard time breathing now.  Patient states that lately she has been falling asleep and sometimes drops things.       HISTORY OF PRESENT ILLNESS: Sandee Evans is a 70 y.o. female is here for sleep evaluation.   Patient with h/o chronic atrial fibrillation and was referred by cardiologist for jennie evaluation.  Our first encounter was 10/12/15.  At that time, patient with loud snoring.  No witnessed sleep apnea.  +fatigue upon awake most days (3-4 times per week).  No sleepiness a/w driving.  Occasional sleep talking.  No sleep walking.  Per , sleep with frequent toss and turn.  No cataplexy.        +recurring "jumping" sensation in legs at night.  Worse when laying down at night.  Does not occur during the day.  Improve with stretching and moving.      During our initial evaluation, patient was recommended sleep study.  Patient did not get sleep study until 2020.    S/p hsat 10/13/20 with ahi of 90 by SURJIT Bray.  Patient was set up with bpap during hospitalization 7/20.  Patient was set up with bipap ST 18/7 with rate of 12.  Patient was using nightly but stopped due to nasal congestion.  Another reason for poor compliance was due to lack of supplies.  +EDS without bipap.    Cough and congestion for past several weeks.  Congestion improve with steroid but recurred after steroid cessation.  +still smoke 3-4 cigarette per day.  Currently on spiriva and albuterol.  +nasal congestion.      [place] Sleepiness Scale score during initial sleep evaluation was 18.    SLEEP ROUTINE:  Activity the hour prior to sleep: watch tv    Bed partner:    Time to bed:  10 pm   Lights off:  usually  Sleep onset latency:  30 minutes       Disruptions or awakenings:    2-3 times per night   Wakeup time:     around 4 am   Perceived sleep quality:  Tire on most days " Called pt to confirm 8:30am arrival time for procedure. Gave pt NPO instructions and gave pt opportunity to ask questions. Pt verbalized understanding.         Daytime naps:      Occasional 2 hours nap (drowsy)    Weekend sleep routine:      Same schedule  Caffeine use: none  exercise habit:   none      PAST MEDICAL HISTORY:    Active Ambulatory Problems     Diagnosis Date Noted    Urge incontinence 01/11/2013    DDD (degenerative disc disease), lumbar 01/11/2013    DJD (degenerative joint disease) of knee 01/11/2013    Hyperlipidemia 01/11/2013    Depressed 01/11/2013    Insomnia 01/11/2013    Vitamin D deficiency disease 01/11/2013    Lumbar radicular pain 01/11/2013    Essential hypertension 01/11/2013    Severe obesity (BMI >= 40)     Colon polyps 08/09/2013    Diverticulosis 08/09/2013    Chronic pain 08/09/2013    Pre-diabetes 08/09/2013    Longstanding persistent atrial fibrillation 07/20/2015    History of pancreatitis 09/22/2015    Osteoarthritis of right knee 05/23/2016    Depression 05/30/2016    Type 2 diabetes mellitus with diabetic cataract, without long-term current use of insulin     Dry eye syndrome, bilateral 12/18/2017    Nuclear sclerosis, bilateral 12/18/2017    Refractive error 12/18/2017    Hidradenitis suppurativa of right axilla 12/31/2017    PVD (peripheral vascular disease) 05/28/2018    Screen for colon cancer 06/25/2019    Chronic bilateral low back pain without sciatica 09/14/2019    ESQUIVEL (dyspnea on exertion) 09/03/2020    Tobacco dependence 09/03/2020    HIEU treated with BiPAP 09/03/2020    Pulmonary hypertension 07/20/2021    Tobacco abuse 07/20/2021    Hypothyroidism 07/20/2021    Current mild episode of major depressive disorder without prior episode 07/29/2021    COPD with chronic bronchitis 08/02/2021    Atelectasis 10/26/2021    Chronic kidney disease, stage 3a 06/13/2022    Solitary pulmonary nodule 09/02/2022     Resolved Ambulatory Problems     Diagnosis Date Noted    Encounter for screening colonoscopy 08/02/2013    A-fib 03/20/2015    Dysphagia 06/22/2015    Nausea 09/22/2015    Gross hematuria 05/24/2016    Acute viral  syndrome 12/31/2017    Other constipation 02/22/2019    Acute on chronic diastolic congestive heart failure 07/16/2021    Chronic respiratory failure with hypercapnia 07/20/2021    Debility 07/20/2021    Acute metabolic encephalopathy 07/20/2021    ANNA MARIE (acute kidney injury) 07/22/2021    Acute bronchitis 08/02/2021    Acute respiratory failure with hypoxia and hypercarbia 05/20/2022    Dysuria 05/20/2022     Past Medical History:   Diagnosis Date    Anticoagulant long-term use     Arthritis     Atrial fibrillation     Breast cyst     CHF (congestive heart failure)     Degenerative disc disease     Edema     HLD (hyperlipidemia)     Hx of psychiatric care     Hypertension     Obesity, morbid     HIEU (obstructive sleep apnea)     Other abnormal glucose     Psychiatric problem     Requires assistance with activities of daily living (ADL)     Sleep apnea     Smoker     SOB (shortness of breath)     SOB (shortness of breath)     Thyroid disease     TMJ (temporomandibular joint disorder)     Unsteady gait     Weakness generalized     Wears glasses                 PAST SURGICAL HISTORY:    Past Surgical History:   Procedure Laterality Date    BREAST BIOPSY Right     over 10 yrs ago/ benign    BREAST CYST EXCISION Right     BREAST LUMPECTOMY Right     over 10 yrs ago, ex bx/ benign    COLONOSCOPY N/A 6/25/2019    Procedure: COLONOSCOPY;  Surgeon: Micheline Flores MD;  Location: [facility];  Service: Endoscopy;  Laterality: N/A;  RX XARELTO ok to hold (2 days) per Dr. Naik see scan 3/20/19    ENDOSCOPIC ULTRASOUND OF UPPER GASTROINTESTINAL TRACT N/A 1/26/2021    Procedure: ULTRASOUND-ENDOSCOPIC-UPPER;  Surgeon: Stevenson Phliippe MD;  Location: [facility] ENDO;  Service: Endoscopy;  Laterality: N/A;    HYSTERECTOMY      JOINT REPLACEMENT Bilateral     knees    OOPHORECTOMY      rt.knee surgery 2016      TOTAL KNEE ARTHROPLASTY Left 2/19/2019    Procedure: ARTHROPLASTY, KNEE, TOTAL;  Surgeon: Med Hanson MD;  Location: [facility]  OR;  Service: Orthopedics;  Laterality: Left;  10AM START PER  PER JAYLIN TEXT @ 8:34AM ON 2-18-19  SUPINE  HARRIS LOYA [phone] TEXTED HIM ON 1-24-19 @ 7:57AM  RN PRE OP 2-13-19--BMI--48.9    underarm gland\ Bilateral          FAMILY HISTORY:                Family History   Problem Relation Age of Onset    Diabetes Mother     Hypertension Mother     Cancer Mother     No Known Problems Father     No Known Problems Sister     Diabetes Brother     Cancer Brother     No Known Problems Maternal Aunt     No Known Problems Maternal Uncle     No Known Problems Paternal Aunt     No Known Problems Paternal Uncle     No Known Problems Maternal Grandmother     No Known Problems Maternal Grandfather     No Known Problems Paternal Grandmother     No Known Problems Paternal Grandfather     No Known Problems Other     Amblyopia Neg Hx     Blindness Neg Hx     Cataracts Neg Hx     Glaucoma Neg Hx     Macular degeneration Neg Hx     Retinal detachment Neg Hx     Strabismus Neg Hx     Stroke Neg Hx     Thyroid disease Neg Hx        SOCIAL HISTORY:          Tobacco:   Social History     Tobacco Use   Smoking Status Every Day    Current packs/day: 0.50    Average packs/day: 0.5 packs/day for 50.6 years (25.3 ttl pk-yrs)    Types: Cigarettes    Start date: 1973   Smokeless Tobacco Current       alcohol use:    Social History     Substance and Sexual Activity   Alcohol Use No                 Occupation:  Ret[employer]     ALLERGIES:    Review of patient's allergies indicates:   Allergen Reactions    Ace inhibitors      Cough         CURRENT MEDICATIONS:    Current Outpatient Medications   Medication Sig Dispense Refill    albuterol (PROVENTIL/VENTOLIN HFA) 90 mcg/actuation inhaler 2 puffs every 6 (six) hours as needed.      albuterol-ipratropium (DUO-NEB) 2.5 mg-0.5 mg/3 mL nebulizer solution Take 3 mLs by nebulization every 6 (six) hours as needed for Wheezing. Rescue 75 mL 0    atorvastatin (LIPITOR) 40 MG tablet TAKE  1 TABLET(40 MG) BY MOUTH EVERY DAY 90 tablet 1    azelastine (ASTELIN) 137 mcg (0.1 %) nasal spray 1 spray (137 mcg total) by Nasal route 2 (two) times daily. 30 mL 3    buPROPion (WELLBUTRIN XL) 150 MG TB24 tablet Take 1 tablet (150 mg total) by mouth once daily. 90 tablet 1    cetirizine (ZYRTEC) 10 MG tablet Take 1 tablet (10 mg total) by mouth once daily. 90 tablet 3    fluticasone propionate (FLONASE) 50 mcg/actuation nasal spray SHAKE LIQUID AND USE 2 SPRAYS(100 MCG) IN EACH NOSTRIL EVERY DAY 48 g 0    furosemide (LASIX) 40 MG tablet Take 1 tablet (40 mg total) by mouth once daily. 90 tablet 3    gabapentin (NEURONTIN) 100 MG capsule Take 1 capsule (100 mg total) by mouth nightly as needed. 90 capsule 1    ketoconazole (NIZORAL) 2 % cream Apply topically once daily. 60 g 2    losartan (COZAAR) 25 MG tablet TAKE 1 TABLET(25 MG) BY MOUTH EVERY DAY 90 tablet 3    metoprolol succinate (TOPROL-XL) 100 MG 24 hr tablet Take 1 tablet (100 mg total) by mouth once daily. 90 tablet 3    sertraline (ZOLOFT) 100 MG tablet Take 1 tablet (100 mg total) by mouth once daily. 90 tablet 3    SPIRIVA RESPIMAT 2.5 mcg/actuation inhaler INHALE 2 PUFFS BY MOUTH DAILY 4 g 5    triamcinolone acetonide 0.1% (KENALOG) 0.1 % ointment APPLY BETWEEN THE KNEES AND UPPER THIGHS TWICE DAILY FOR NO MORE THAN 7 TO 14 DAYS 454 g 1    XARELTO 20 mg Tab Take 1 tablet (20 mg total) by mouth once daily. 90 tablet 3    fluticasone-salmeterol diskus inhaler 250-50 mcg Inhale 1 puff into the lungs 2 (two) times daily. 60 each 3    predniSONE (DELTASONE) 20 MG tablet Take 2 tablets (40 mg total) by mouth once daily. for 5 days 10 tablet 0     No current facility-administered medications for this visit.                  REVIEW OF SYSTEMS:     Sleep related symptoms as per HPI.  CONST:Denies weight gain ; lost 40# since 2014   HEENT: Denies sinus congestion  PULM: Denies dyspnea  CARD:  +intermittent palpitations   GI:  Denies acid reflux; +recurring  "vomitting due to pancreatitis  : Denies polyuria  NEURO: Denies headaches  PSYCH: +anxious  HEME: Denies anemia   Otherwise, a balance of systems reviewed is negative.          PHYSICAL EXAM:  Vitals:    08/02/23 1108   BP: 126/81   Pulse: 89   Weight: 126.4 kg (278 lb 8.8 oz)   Height: 5' 3" (1.6 m)   PainSc:   6   PainLoc: Back     Body mass index is 49.34 kg/m².     GENERAL: Normal development, well groomed  HEENT:  Conjunctivae are non-erythematous; Pupils equal, round, and reactive to light; Nose is symmetrical; Nasal mucosa is pink and moist; Septum is midline; Inferior turbinates are normal; Nasal airflow is normal; Posterior pharynx is pink; Modified Mallampati: 2; Posterior palate is normal; Tonsils +1; Uvula is normal and pink;Tongue is normal; Dentition is fair; No TMJ tenderness; Jaw opening and protrusion without click and without discomfort.  NECK: Supple. Neck circumference is 15.5 inches. No thyromegaly. No palpable nodes.     SKIN: On face and neck: No abrasions, no rashes, no lesions.  No subcutaneous nodules are palpable.  RESPIRATORY: Chest is clear to auscultation.  Normal chest expansion and non-labored breathing at rest.  CARDIOVASCULAR: Normal S1, S2.  Irregular rhythm.  No murmurs, gallops or rubs. No carotid bruits bilaterally.  EXTREMITIES: + edema. No clubbing. No cyanosis. Station normal. Gait normal.        NEURO/PSYCH: Oriented to time, place and person. Normal attention span and concentration. Affect is full. Mood is normal.                                              DATA   HSAT: 10-13/2020: AHI 90    PFT 8/8/22 Ratio of 64%; FVC 2.37 L (99%); FEV1 1.52 L (81%); TLC 3.25 L (68%); dlco 18 (88%)   ABG 9/21/21 pH 7.3 pCO2 55.6 pO2 72 SpO2 94 on RA    CT scan (personally reviewed) 8/15/22 no septal reticulation.  No honeycombing.      Echo 5/21/22  The left ventricle is normal in size with normal systolic function.  The estimated ejection fraction is 60%.  Normal right ventricular " "size with normal right ventricular systolic function.  The estimated PA systolic pressure is 33 mmHg.  Atrial fibrillation observed    Lab Results   Component Value Date    TSH 3.261 01/26/2023     ASSESSMENT  Problem List Items Addressed This Visit       COPD with chronic bronchitis    Overview     PFT 9/21/2021: ratio 64 fev1 1.30 (66) fvc 80 tlc 62 dlco 73  Currently on spiriva  Will add laba/ics  Empiric steroid due to worsening of cough   90% oxygen sat on RA.  Declined 6 min walk.  Per patient, oxygen sat 96% at home.  Will send for cxr.                Relevant Medications    predniSONE (DELTASONE) 20 MG tablet    fluticasone-salmeterol diskus inhaler 250-50 mcg    Other Relevant Orders    X-Ray Chest PA And Lateral (Completed)    Insomnia    Overview     difficulty with sleep initiation and maintenance.  May related to hyperarousal from untreated jennie + RLS + conditioned.    Will monitor with resumption of bipap         JENNIE treated with BiPAP    Overview     AHI 90  Currently with bipap st 18/7  with rate of 12  Poor compliance due to nasal congestion and lack of supplies.           Relevant Orders    CPAP/BIPAP SUPPLIES    Tobacco abuse    Overview     Encourage smoking cessation  Declined smoking cessation referral.  "I've been there."             Obesity - loosing 40# since 2014.        RLS - 4/4 criteria.  Patient deferred medical therapy.  Will monitor for now.      Education: During our discussion today, we talked about the etiology of obstructive sleep apnea as well as the potential ramifications of untreated sleep apnea, which could include daytime sleepiness, hypertension, heart disease and/or stroke.     Precautions: The patient was advised to abstain from driving should they feel sleepy or drowsy.       Patient will No follow-ups on file.    35 minutes of total time spent on the encounter, which includes face to face time and non-face to face time preparing to see the patient (eg, review of tests), " Obtaining and/or reviewing separately obtained history, documenting clinical information in the electronic or other health record, independently interpreting results (not separately reported) and communicating results to the patient/family/caregiver, or Care coordination (not separately reported).

## 2023-08-02 NOTE — H&P
I have reviewed the following below and agree with findings. No changes have been made. Plan to proceed with elective procedure. All questions have been answered.    Apollo Moore MD  Neurosurgery    ________________________________________________________________________________    Neurosurgery  History & Physical     SUBJECTIVE:   The patient location is: [place]  The chief complaint leading to consultation is:  intrathecal pump at end of life     Visit type: audiovisual     Face to Face time with patient: 10 minutes  30 minutes of total time spent on the encounter, which includes face to face time and non-face to face time preparing to see the patient (eg, review of tests), Obtaining and/or reviewing separately obtained history, Documenting clinical information in the electronic or other health record, Independently interpreting results (not separately reported) and communicating results to the patient/family/caregiver, or Care coordination (not separately reported).            Each patient to whom he or she provides medical services by telemedicine is:  (1) informed of the relationship between the physician and patient and the respective role of any other health care provider with respect to management of the patient; and (2) notified that he or she may decline to receive medical services by telemedicine and may withdraw from such care at any time.        Chief Complaint: intrathecal pump at end of life     History of Present Illness: Tasha Hawley is a 67 y.o. female who was referred to me by Dr. Nigel Hillman with [facility] for opioid pump replacement. Ms. Hawley has a long standing history of chronic neck and back pain. She has undergone previous cervical fusion and several lumbar surgeries. She eventually had an intrathecal opioid pump placed that has brought her good relief of her pain. Her pump is now approaching its end of life, and she would like to discuss pump  replacement. Her pump has been working well without any issues to suggest a problem with the catheter. Her opioid pump contains hydromorphone per patient report. Last refill was on 6/15, refills typically last her about 2 months. Patient's past medically history is significant for CHF, hypothyroidism, sleep apnea, and carotid artery occlusion. She takes ASA 81 mg daily.             Review of patient's allergies indicates:   Allergen Reactions    (d)-limonene flavor         Other reaction(s): difficult intubation  Other reaction(s): Difficulty breathing    Bactrim [sulfamethoxazole-trimethoprim] Anaphylaxis    Benadryl [diphenhydramine hcl] Shortness Of Breath    Fentanyl Itching, Nausea And Vomiting and Swelling                  Imitrex [sumatriptan succinate] Shortness Of Breath    Percocet [oxycodone-acetaminophen] Shortness Of Breath    Topamax [topiramate] Shortness Of Breath    Vancomycin Anaphylaxis       Rash    Butorphanol tartrate      Darvocet a500 [propoxyphene n-acetaminophen]         Other reaction(s): Difficulty breathing    Evening primrose (oenothera biennis)      Lyrica [pregabalin] Other (See Comments)       tremors    White petrolatum-zinc      Zinc oxide-white petrolatum         Other reaction(s): Difficulty breathing    Latex, natural rubber Itching and Rash    Phenytoin Rash and Other (See Comments)       Trouble breathing         Current Medications          Current Outpatient Medications   Medication Sig Dispense Refill    albuterol-ipratropium (DUO-NEB) 2.5 mg-0.5 mg/3 mL nebulizer solution Take 3 mLs by nebulization every 6 (six) hours as needed for Wheezing or Shortness of Breath. Rescue 90 mL 0    aspirin (ECOTRIN) 81 MG EC tablet Take 1 tablet (81 mg total) by mouth once daily. 90 tablet 3    atorvastatin (LIPITOR) 80 MG tablet Take 1 tablet (80 mg total) by mouth once daily. 90 tablet 3    butalbital-acetaminophen-caffeine -40 mg (FIORICET, ESGIC) -40 mg per tablet Take 1  tablet by mouth daily as needed.        cyanocobalamin, vitamin B-12, 5,000 mcg Subl Place 1 tablet under the tongue once daily.        docusate sodium (COLACE) 100 MG capsule Take 200 mg by mouth every evening.        ferrous gluconate (FERGON) 324 MG tablet Take 1 tablet (324 mg total) by mouth daily with breakfast. 90 tablet 1    fludrocortisone (FLORINEF) 0.1 mg Tab Take a half-tablet (50 mcg) by mouth once daily 15 tablet 5    FLUoxetine 20 MG capsule Take 3 capsules (60 mg total) by mouth once daily. 270 capsule 3    fluticasone propionate (FLONASE) 50 mcg/actuation nasal spray 2 sprays (100 mcg total) by Each Nostril route once daily. 16 g 0    furosemide (LASIX) 40 MG tablet Take 1 tablet (40 mg total) by mouth 2 (two) times a day. 90 tablet 3    HYDROcodone-acetaminophen (NORCO)  mg per tablet Take 1 tablet by mouth every 6 (six) hours as needed for breakthrough pain.        hydrocortisone (CORTEF) 10 MG Tab Take 1 tablet (10 mg total) by mouth every evening. 30 tablet 5    hydrOXYzine (ATARAX) 50 MG tablet Take 1 tablet (50 mg total) by mouth every 4 (four) hours as needed for Anxiety. 30 tablet 0    intrathecal pain pump compound Hydromorphone (7.5 mg/mL) infusion at 8.59 mg/day (0.3578 mg/hr) out of a total reservoir volume of 40 mL  Pump filled monthly        ketorolac (TORADOL) 10 mg tablet Take 10 mg by mouth daily as needed.        levothyroxine (SYNTHROID) 150 MCG tablet Take 1 tablet (150 mcg total) by mouth before breakfast. 30 tablet 11    LIDOcaine (LIDODERM) 5 % Place 1 patch onto the skin once daily. Remove & Discard patch within 12 hours or as directed by MD   0    liothyronine (CYTOMEL) 25 MCG Tab Take 1 tablet (25 mcg total) by mouth once daily. 30 tablet 11    mupirocin (BACTROBAN) 2 % ointment Apply topically 2 (two) times daily. Apply small amount to each nostril twice a day for 5 days as instructed. for 5 days 1 each 0    nitroGLYCERIN (NITROSTAT) 0.4 MG SL tablet Place 0.4 mg  under the tongue every 5 (five) minutes as needed for Chest pain.        ondansetron (ZOFRAN-ODT) 4 MG TbDL Take 4 mg by mouth every 4 to 6 hours as needed.        pantoprazole (PROTONIX) 40 MG tablet Take 1 tablet (40 mg total) by mouth once daily. 90 tablet 3    potassium chloride (MICRO-K) 10 MEQ CpSR Take 2 capsules (20 mEq total) by mouth once daily. 60 capsule 11    promethazine (PHENERGAN) 25 MG tablet Take 25 mg by mouth every 6 (six) hours as needed for Nausea.        QUEtiapine (SEROQUEL) 100 MG Tab TAKE 2 TABLETS (200 MG) BY MOUTH NIGHTLY (Patient taking differently: Take 200 mg by mouth nightly.) 180 tablet 3    senna (SENNA LAX) 8.6 mg tablet Take 2 tablets by mouth 2 (two) times daily.        tiotropium bromide (SPIRIVA RESPIMAT) 2.5 mcg/actuation inhaler Inhale 2 puffs into the lungs once daily. Controller 4 g 1    tiZANidine (ZANAFLEX) 4 MG tablet Take 4 mg by mouth 2 (two) times daily as needed.        traZODone (DESYREL) 300 MG tablet Take 1 tablet (300 mg total) by mouth every evening. 90 tablet 0      No current facility-administered medications for this visit.                 Past Medical History:   Diagnosis Date    Anticoagulant long-term use      Anxiety      Arthritis      Bilateral lower extremity edema       severe chronic    Carotid artery occlusion      Cataract      CHF (congestive heart failure)      Coronary artery disease       subtotalled LAD with collateral    Depression      Fever blister      Hard of hearing      Hypokalemia 1/9/2023    Hyponatremia 2/4/2022    Hypothyroid      Iron deficiency anemia      Lumbar radiculopathy       with chronic pain    Ocular migraine      Other emphysema 5/22/2023    Renal disorder      Sleep apnea       cpap            Past Surgical History:   Procedure Laterality Date    ADENOIDECTOMY        BACK SURGERY         x 12    CARDIAC CATHETERIZATION   2016     subtotalled LAD with right to left collaterals    CATARACT EXTRACTION W/  INTRAOCULAR LENS  IMPLANT Left       Dr Coleman     CYSTOSCOPIC LITHOLAPAXY N/A 6/27/2019     Procedure: CYSTOLITHOLAPAXY;  Surgeon: Shireen Mayo MD;  Location: [facility] OR 2ND FLR;  Service: Urology;  Laterality: N/A;    CYSTOSCOPIC LITHOLAPAXY N/A 9/3/2019     Procedure: CYSTOLITHOLAPAXY;  Surgeon: Shireen Mayo MD;  Location: [facility] OR 2ND FLR;  Service: Urology;  Laterality: N/A;    CYSTOSCOPY N/A 7/13/2021     Procedure: CYSTOSCOPY;  Surgeon: Shireen Mayo MD;  Location: [facility] OR 1ST FLR;  Service: Urology;  Laterality: N/A;    CYSTOSCOPY   11/16/2021     Procedure: CYSTOSCOPY;  Surgeon: Shireen Mayo MD;  [facility];  Service: Urology;;    CYSTOSCOPY   7/19/2022     Procedure: CYSTOSCOPY;  Surgeon: Shireen Mayo MD;  Location: [facility] OR 1ST FLR;  Service: Urology;;    CYSTOSCOPY WITH INJECTION OF PERIURETHRAL BULKING AGENT   7/19/2022     Procedure: CYSTOSCOPY, WITH PERIURETHRAL BULKING AGENT INJECTION-MACROPLASTIQUE;  Surgeon: Shireen Mayo MD;  Location: [facility] OR 1ST FLR;  Service: Urology;;    CYSTOSCOPY WITH INJECTION OF PERIURETHRAL BULKING AGENT N/A 3/28/2023     Procedure: CYSTOSCOPY, WITH PERIURETHRAL BULKING AGENT INJECTION;  Surgeon: Shireen Mayo MD;  Location: [facility] OR 1ST FLR;  Service: Urology;  Laterality: N/A;  [place]    CYSTOSCOPY,WITH BOTULINUM TOXIN INJECTION N/A 12/13/2022     Procedure: CYSTOSCOPY,WITH BOTULINUM TOXIN INJECTION;  Surgeon: Shireen Mayo MD;  [facility]ST [facility];  Service: Urology;  Laterality: N/A;  300 U    CYSTOSCOPY,WITH BOTULINUM TOXIN INJECTION N/A 3/28/2023     Procedure: CYSTOSCOPY,WITH BOTULINUM TOXIN INJECTION;  Surgeon: Shireen Mayo MD;  [facility]ST FLR;  Service: Urology;  Laterality: N/A;  45 MIN.    300 UNITS    ESOPHAGOGASTRODUODENOSCOPY N/A 5/23/2018     Procedure: ESOPHAGOGASTRODUODENOSCOPY (EGD);  Surgeon: Prince Vance MD;  Location: [facility] ([address] [place] [address]);  Service: Endoscopy;  Laterality: N/A;  r/s 'd per   Vance due to family emergency- ER    ESOPHAGOGASTRODUODENOSCOPY N/A 6/23/2023     Procedure: EGD (ESOPHAGOGASTRODUODENOSCOPY);  Surgeon: Juaquin Barry MD;  Location: [facility] (2ND FLR);  Service: Endoscopy;  Laterality: N/A;  Refferal: ROSEANNE VANCE  Order  tele enc 5/18/23  dx: history of a Nissen fundoplication  Additional Scheduling Information:  On home oxygen 2nd floor for airway protection also with a pain pump elevated BMI close to 40   Prep Gastroparesis   ins    HYSTERECTOMY   1975     endometriosis    INJECTION OF BOTULINUM TOXIN TYPE A   7/13/2021     Procedure: INJECTION, BOTULINUM TOXIN, 200units;  Surgeon: Shireen Mayo MD;  [facility];  Service: Urology;;    INJECTION OF BOTULINUM TOXIN TYPE A   11/16/2021     Procedure: INJECTION, BOTULINUM TOXIN, 200units;  Surgeon: Shireen Mayo MD;  [facility];  Service: Urology;;    INJECTION OF BOTULINUM TOXIN TYPE A   7/19/2022     Procedure: INJECTION, BOTULINUM TOXIN, 300 units ;  Surgeon: Shireen Mayo MD;  [facility];  Service: Urology;;    INSERTION, SUPRAPUBIC CATHETER N/A 12/13/2022     Procedure: INSERTION, SUPRAPUBIC CATHETER;  Surgeon: Shireen Mayo MD;  [facility];  Service: Urology;  Laterality: N/A;  exchange    pain pump placement         SQ Dilaudid Pump managed by Dr. Hillman, Pain Management    REMOVAL OF BONE SPUR OF FOOT Bilateral 9/16/2022     Procedure: EXCISION ARTHRITIC BONE, BILATERAL FOOT;  Surgeon: Adam Mcguire DPM;  Location: [facility] OR;  Service: Podiatry;  Laterality: Bilateral;    REPLACEMENT OF CATHETER N/A 10/31/2019     Procedure: REPLACEMENT, CATHETER-SUPRAPUBIC;  Surgeon: Shireen Mayo MD;  Location: [facility] OR [address] [place] [address];  Service: Urology;  Laterality: N/A;    SPINAL CORD STIMULATOR REMOVAL         before [place]    SPINE SURGERY   5-13-13     CERVICAL FUSION    TONSILLECTOMY          Family History         Problem Relation (Age of  "Onset)     Cancer Mother (55), Father     Cirrhosis Paternal Aunt, Paternal Uncle     Colon cancer Maternal Uncle     Esophageal cancer Father     Heart disease Maternal Uncle     Liver disease Paternal Aunt, Paternal Uncle     No Known Problems Brother, Brother, Sister, Maternal Aunt, Maternal Grandfather, Paternal Grandmother, Paternal Grandfather     Parkinsonism Maternal Grandmother     Tremor Maternal Grandmother             Social History            Socioeconomic History    Marital status:    Tobacco Use    Smoking status: Never    Smokeless tobacco: Never   Substance and Sexual Activity    Alcohol use: Never    Drug use: Yes       Types: Marijuana       Comment: "medical marijuana gummies"    Sexual activity: Never       Partners: Male      Social Determinants of Health          Financial Resource Strain: Low Risk     Difficulty of Paying Living Expenses: Not hard at all   Food Insecurity: No Food Insecurity    Worried About Running Out of Food in the Last Year: Never true    Ran Out of Food in the Last Year: Never true   Transportation Needs: No Transportation Needs    Lack of Transportation (Medical): No    Lack of Transportation (Non-Medical): No   Physical Activity: Insufficiently Active    Days of Exercise per Week: 4 days    Minutes of Exercise per Session: 10 min   Stress: No Stress Concern Present    Feeling of Stress : Only a little   Social Connections: Moderately Isolated    Frequency of Communication with Friends and Family: More than three times a week    Frequency of Social Gatherings with Friends and Family: Three times a week    Attends [employer] Services: Never    Active Member of Clubs or Organizations: No    Attends Club or Organization Meetings: Never    Marital Status:    Housing Stability: Low Risk     Unable to Pay for Housing in the Last Year: No    Number of Places Lived in the Last Year: 1    Unstable Housing in the Last Year: No         Review of Systems "   Constitutional:  Negative for activity change and fever.   HENT:  Negative for ear pain and trouble swallowing.    Eyes:  Negative for photophobia and visual disturbance.   Respiratory:  Negative for shortness of breath and wheezing.    Cardiovascular:  Negative for chest pain.   Gastrointestinal:  Negative for abdominal pain, nausea and vomiting.   Genitourinary:  Negative for dysuria and hematuria.   Musculoskeletal:  Positive for back pain and neck pain.   Skin:  Negative for wound.   Neurological:  Positive for headaches. Negative for dizziness, seizures, weakness and numbness.   Psychiatric/Behavioral:  Negative for confusion.       OBJECTIVE:      Vital Signs  There is no height or weight on file to calculate BMI.        Neurosurgery Physical Exam  General: well developed, well nourished, no distress.   Head: normocephalic  Neurologic: Alert and oriented. Thought content appropriate.  GCS: Motor: 6/Verbal: 5/Eyes: 4 GCS Total: 15  Mental Status: Awake, Alert, Oriented x 4  Language: No aphasia  Speech: No dysarthria  Cranial nerves: CN II-XII grossly intact.   Eyes: EOMI appear grossly intact  Pulmonary: no signs of respiratory distress  Motor Strength:Moves all extremities spontaneously with good tone. BUE and BLE are at least 3/5. No abnormal movements seen.   Finger-to-nose: intact bilaterally   Pronator drift: absent bilaterally     Well healed incision to RLQ of abdomen      Diagnostic Results:  No pertinent studies available for review   ASSESSMENT/PLAN:      Tasha Hawley is a 67 y.o. female with chronic pain syndrome s/p intrathecal pain pump. Her pain pump is approaching its end of life. She recently had a refill of the pump on 6/15. We will plan for replacement of the pump in the near future. At that time, we will transfer the contents of her old pump into the new pump. The procedure was discussed in detail including R/B/A. She voiced understanding. All of her questions were answered. She will  need to hold her ASA prior to the procedure. We will have her see Dr. Ortiz for all of the necessary pre op testing. She and her step daughter, Jaycee, know they can call the clinic prior to surgery with any questions or concerns.

## 2023-08-02 NOTE — TRANSFER OF CARE
"Anesthesia Transfer of Care Note    Patient: Tasha Hawley    Procedure(s) Performed: Procedure(s) (LRB):  REPLACEMENT, BACLOFEN PUMP (N/A)    Patient location: [facility]    Anesthesia Type: general    Transport from OR: Transported from OR on room air with adequate spontaneous ventilation    Post pain: adequate analgesia    Post assessment: no apparent anesthetic complications    Post vital signs: stable    Level of consciousness: awake    Nausea/Vomiting: no nausea/vomiting    Complications: none    Transfer of care protocol was followed      Last vitals:   Visit Vitals  BP (!) 126/58   Pulse 88   Temp 36.2 °C (97.2 °F) (Oral)   Resp 16   Ht 5' 4" (1.626 m)   Wt 97.5 kg (214 lb 15.2 oz)   LMP  (LMP Unknown)   SpO2 98%   Breastfeeding No   BMI 36.90 kg/m²     "

## 2023-08-02 NOTE — PLAN OF CARE
8239-Dr Moore paged due to patient refusing to get stuck again for labs.  Waiting on response.    2605-Dr Deleon at bedside.  Explained unable to obtain all labs.  Only PT/PTT were drawn from IV.       7115- Preop assessment completed. Patient wheeled in wheelchair.  Ambulates via walker. Also wears Oxygen 3-5 LPM on NC also wears CPAP at night.  Patient did not bring portable oxygen due to waiting for a tank to come and the home o2 was too big to transport. Patient o2 Sats of 91% on room air, but sats 97% on 3 LPM.   Belongings taken to locker.

## 2023-08-02 NOTE — OP NOTE
[facility] - Surgery ([facility])  Neurosurgery  Operative Note    SUMMARY      Date of Procedure: 8/2/2023     Procedure: Procedure(s) (LRB):  REPLACEMENT, INTRATHECAL PUMP (N/A)     Surgeon(s) and Role:     * Smitha Condon MD - Primary     * Apollo Moore MD - Resident - Assisting    Pre-Operative Diagnosis: Chronic pain syndrome [G89.4]    Post-Operative Diagnosis: Post-Op Diagnosis Codes:     * Chronic pain syndrome [G89.4]    Anesthesia: Local/MAC    Technical Procedures Used:   1. Replacement of intrathecal pain pump (40 cc [id number])  2. Revision of intrathecal catheter (we removed 15 cm length, now 0.146cc total volume/66.5 cm length)   3. Interrogation and programming of device (hydromorphone 7.5 mg/ml at 4.3562 mg/day)      Indications:   Tasha Hawley is a 67 y.o. female with chronic pain syndrome s/p intrathecal pain pump. Her pain pump is approaching its end of life. She recently had a refill of the pump on 6/15. We will plan for replacement of the pump in the near future. At that time, we will transfer the contents of her old pump into the new pump.     We had a pleasant discussion about the risks, benefits, and alternatives to surgery. Risks include, but are not limited to, bleeding, pain, infection, scarring, need for further/repeat procedure, death, paralysis, damage to bowel/bladder/sexual function, stroke/damage to major blood vessels, leak of cerebrospinal fluid, and hardware-related complications. Medication overdose or withdrawal and catheter tip granuloma remain ongoing risks of any intrathecal therapy. Informed consent was obtained of the patient.      Description of the Procedure:   The patient was brought back to the operating room, and monitored anesthesia care was induced by the anesthesia service. She was carefully positioned supine. Stress dose steroids were ordered per Dr. Ortiz's communications. She was prepped and draped in the usual sterile fashion. Note was made to exclude  the pre-existing areas of what appear to be yeast infection under the breasts and around the suprapubic catheter from the field.     The patient received IV Ancef for prophylaxis.  A 15 blade was used to incise the skin though the previously created incision, and Bovie cautery was used to dissect open the pocket. The old pump was brought into view. We aspirated from the CSF access port, which was found to be somewhat sluggish. There was no flow from the distal end of the connector, so we inspected the catheter more carefully and found there was a frayed area within the pocket. We cut this area out and spliced a new connector on. There was good CSF flowing from the end prior to attaching it to the new pump, which was filled with the drug from the old pump. CSF aspirated easily once the catheter revision was completed.      The pocket was expanded slightly. After copiously irrigating the abdominal pocket, we placed four 0 silk sutures into the fascia to secure the pump. Care was taken to coil the catheter under the pump, and the pump was positioned in the pocked and secured with 0 silk sutures. We then irrigated the incision and closed it in layers. We used a layer of 0 Vicryl and then a layer of 3.0 Vicryl and closed skin with a running 4.0 Monocryl.      A sterile dressing of bacitracin, Telfa, and Tegaderm was applied, followed by a pocket protector in lieu of an abdominal binder given the concern for yeast. The patient was carefully rolled back supine. She was awakened by the anesthesia team and brought to the recovery room. All counts were correct x2, and we used antibiotic-containing irrigation throughout the case.      Complications: No     Estimated Blood Loss (EBL): minimal            Specimens:   Specimen (24h ago, onward)      None             Implants:   Implant Name Type Inv. Item Serial No.  Lot No. LRB No. Used Action   PUMP PAIN CNTRL INF 40ML - [id number]  PUMP PAIN CNTRL INF 40ML  [id number] [employer] [facility]  N/A 1 Implanted   SUTURELESS PUMP CONNECTOR REVISION KIT    [employer] [id number] Right 1 Implanted              Condition: Stable    Disposition: PACU - hemodynamically stable.    Attestation: I was present and scrubbed for the entire procedure.

## 2023-08-02 NOTE — BRIEF OP NOTE
[facility] - Surgery ([facility])  Brief Operative Note    Surgery Date: 8/2/2023     Surgeon(s) and Role:     * Smitha Condon MD - Primary     * Apollo Moore MD - Resident - Assisting        Pre-op Diagnosis:  Chronic pain syndrome [G89.4]    Post-op Diagnosis:  Post-Op Diagnosis Codes:     * Chronic pain syndrome [G89.4]    Procedure(s) (LRB):  REPLACEMENT, BACLOFEN PUMP (N/A)    Anesthesia: General    Operative Findings: replacement, intrathecal pain pump reservoir, revision distal catheter tip. See full op note for further details    Estimated Blood Loss: * No values recorded between 8/2/2023  8:51 AM and 8/2/2023  9:55 AM *         Specimens:   Specimen (24h ago, onward)      None              Discharge Note    OUTCOME: Patient tolerated treatment/procedure well without complication and is now ready for discharge.    DISPOSITION: Home or Self Care    FINAL DIAGNOSIS:  <principal problem not specified>    FOLLOWUP: In clinic    DISCHARGE INSTRUCTIONS:   See patient DC instructions

## 2023-08-03 ENCOUNTER — OUTPATIENT CASE MANAGEMENT (OUTPATIENT)
Dept: ADMINISTRATIVE | Facility: OTHER | Age: 68
End: 2023-08-03
Payer: MEDICARE

## 2023-08-03 NOTE — ANESTHESIA POSTPROCEDURE EVALUATION
Anesthesia Post Evaluation    Patient: Tasha Hawley    Procedure(s) Performed: Procedure(s) (LRB):  REPLACEMENT, BACLOFEN PUMP (N/A)    Final Anesthesia Type: general      Patient location during evaluation: PACU  Patient participation: Yes- Able to Participate  Level of consciousness: awake and alert and oriented  Pain management: adequate  Airway patency: patent    PONV status at discharge: No PONV  Anesthetic complications: no      Cardiovascular status: blood pressure returned to baseline and hemodynamically stable  Respiratory status: unassisted  Hydration status: euvolemic  Follow-up not needed.          Vitals Value Taken Time   BP 97/46 08/02/23 1141   Temp 37 °C (98.6 °F) 08/02/23 1130   Pulse 87 08/02/23 1146   Resp 16 08/02/23 1144   SpO2 92 % 08/02/23 1146   Vitals shown include unvalidated device data.      No case tracking events are documented in the log.      Pain/Low Score: Low Score: 9 (8/2/2023 11:15 AM)

## 2023-08-03 NOTE — LETTER
Tasha Hawley  [address] [place] [address]      Dear Tasha Hawley,     I am writing from the Outpatient Care Management Department at [facility]. I have been unsuccessful at reaching you since we spoke on 7/27/2023.      Please contact me at [phone] from 8:00AM to 4:30 PM on Monday thru Friday.     As you know, [facility] also has a program with a nurse available 24/7 to answer questions or provide medical advice.  [facility] can be reached at [phone].    Sincerely,       Jaycee Caro [facility]  Outpatient Care Management

## 2023-08-04 RX ORDER — MUPIROCIN 20 MG/G
OINTMENT TOPICAL 2 TIMES DAILY
Qty: 1 EACH | Refills: 0 | OUTPATIENT
Start: 2023-08-04 | End: 2023-08-09

## 2023-08-07 DIAGNOSIS — G47.01 INSOMNIA DUE TO MEDICAL CONDITION: Chronic | ICD-10-CM

## 2023-08-07 RX ORDER — TRAZODONE HYDROCHLORIDE 300 MG/1
300 TABLET ORAL NIGHTLY
Qty: 90 TABLET | Refills: 3 | Status: SHIPPED | OUTPATIENT
Start: 2023-08-07 | End: 2023-10-27 | Stop reason: SDUPTHER

## 2023-08-07 NOTE — TELEPHONE ENCOUNTER
No care due was identified.  [facility] Embedded Care Due Messages. Reference number: [id number].   8/07/2023 10:36:17 AM CDT

## 2023-08-08 ENCOUNTER — HOSPITAL ENCOUNTER (EMERGENCY)
Facility: HOSPITAL | Age: 68
Discharge: HOME | End: 2023-08-08
Attending: STUDENT IN AN ORGANIZED HEALTH CARE EDUCATION/TRAINING PROGRAM
Payer: MEDICARE

## 2023-08-08 VITALS
HEART RATE: 78 BPM | BODY MASS INDEX: 36.9 KG/M2 | DIASTOLIC BLOOD PRESSURE: 63 MMHG | SYSTOLIC BLOOD PRESSURE: 137 MMHG | WEIGHT: 215 LBS | TEMPERATURE: 98 F | RESPIRATION RATE: 18 BRPM | OXYGEN SATURATION: 93 %

## 2023-08-08 DIAGNOSIS — R07.9 CHEST PAIN: ICD-10-CM

## 2023-08-08 DIAGNOSIS — R06.02 SOB (SHORTNESS OF BREATH): Primary | ICD-10-CM

## 2023-08-08 DIAGNOSIS — E87.6 HYPOKALEMIA: ICD-10-CM

## 2023-08-08 DIAGNOSIS — R31.9 HEMATURIA, UNSPECIFIED TYPE: ICD-10-CM

## 2023-08-08 LAB
ALBUMIN SERPL BCP-MCNC: 3.1 G/DL (ref 3.5–5.2)
ALLENS TEST: ABNORMAL
ALLENS TEST: ABNORMAL
ALP SERPL-CCNC: 119 U/L (ref 55–135)
ALT SERPL W/O P-5'-P-CCNC: 5 U/L (ref 10–44)
ANION GAP SERPL CALC-SCNC: 10 MMOL/L (ref 8–16)
AST SERPL-CCNC: 10 U/L (ref 10–40)
BACTERIA #/AREA URNS HPF: ABNORMAL /HPF
BASOPHILS # BLD AUTO: 0.02 K/UL (ref 0–0.2)
BASOPHILS NFR BLD: 0.4 % (ref 0–1.9)
BILIRUB SERPL-MCNC: 0.3 MG/DL (ref 0.1–1)
BILIRUB UR QL STRIP: NEGATIVE
BNP SERPL-MCNC: 16 PG/ML (ref 0–99)
BUN SERPL-MCNC: 5 MG/DL (ref 8–23)
CALCIUM SERPL-MCNC: 9.2 MG/DL (ref 8.7–10.5)
CHLORIDE SERPL-SCNC: 102 MMOL/L (ref 95–110)
CLARITY UR: CLEAR
CO2 SERPL-SCNC: 28 MMOL/L (ref 23–29)
COLOR UR: YELLOW
CREAT SERPL-MCNC: 0.8 MG/DL (ref 0.5–1.4)
CTP QC/QA: YES
DELSYS: ABNORMAL
DELSYS: ABNORMAL
DIFFERENTIAL METHOD: ABNORMAL
EOSINOPHIL # BLD AUTO: 0.4 K/UL (ref 0–0.5)
EOSINOPHIL NFR BLD: 6.2 % (ref 0–8)
ERYTHROCYTE [DISTWIDTH] IN BLOOD BY AUTOMATED COUNT: 14.9 % (ref 11.5–14.5)
EST. GFR  (NO RACE VARIABLE): >60 ML/MIN/1.73 M^2
FIO2: 21
FLOW: 2
GLUCOSE SERPL-MCNC: 103 MG/DL (ref 70–110)
GLUCOSE UR QL STRIP: NEGATIVE
HCO3 UR-SCNC: 33.3 MMOL/L (ref 24–28)
HCO3 UR-SCNC: 33.9 MMOL/L (ref 24–28)
HCT VFR BLD AUTO: 31.5 % (ref 37–48.5)
HGB BLD-MCNC: 9.9 G/DL (ref 12–16)
HGB UR QL STRIP: ABNORMAL
HYALINE CASTS #/AREA URNS LPF: 11 /LPF
IMM GRANULOCYTES # BLD AUTO: 0.01 K/UL (ref 0–0.04)
IMM GRANULOCYTES NFR BLD AUTO: 0.2 % (ref 0–0.5)
KETONES UR QL STRIP: NEGATIVE
LACTATE SERPL-SCNC: 1 MMOL/L (ref 0.5–2.2)
LEUKOCYTE ESTERASE UR QL STRIP: ABNORMAL
LYMPHOCYTES # BLD AUTO: 1.4 K/UL (ref 1–4.8)
LYMPHOCYTES NFR BLD: 24.5 % (ref 18–48)
MAGNESIUM SERPL-MCNC: 1.8 MG/DL (ref 1.6–2.6)
MCH RBC QN AUTO: 26.1 PG (ref 27–31)
MCHC RBC AUTO-ENTMCNC: 31.4 G/DL (ref 32–36)
MCV RBC AUTO: 83 FL (ref 82–98)
MICROSCOPIC COMMENT: ABNORMAL
MODE: ABNORMAL
MODE: ABNORMAL
MONOCYTES # BLD AUTO: 0.7 K/UL (ref 0.3–1)
MONOCYTES NFR BLD: 11.9 % (ref 4–15)
NEUTROPHILS # BLD AUTO: 3.2 K/UL (ref 1.8–7.7)
NEUTROPHILS NFR BLD: 56.8 % (ref 38–73)
NITRITE UR QL STRIP: NEGATIVE
NRBC BLD-RTO: 0 /100 WBC
PCO2 BLDA: 63.7 MMHG (ref 35–45)
PCO2 BLDA: 63.9 MMHG (ref 35–45)
PH SMN: 7.33 [PH] (ref 7.35–7.45)
PH SMN: 7.33 [PH] (ref 7.35–7.45)
PH UR STRIP: 6 [PH] (ref 5–8)
PLATELET # BLD AUTO: 222 K/UL (ref 150–450)
PMV BLD AUTO: 9.4 FL (ref 9.2–12.9)
PO2 BLDA: 42 MMHG (ref 40–60)
PO2 BLDA: 45 MMHG (ref 40–60)
POC BE: 7 MMOL/L
POC BE: 8 MMOL/L
POC SATURATED O2: 72 % (ref 95–100)
POC SATURATED O2: 75 % (ref 95–100)
POC TCO2: 35 MMOL/L (ref 24–29)
POC TCO2: 36 MMOL/L (ref 24–29)
POTASSIUM SERPL-SCNC: 3 MMOL/L (ref 3.5–5.1)
PROCALCITONIN SERPL IA-MCNC: 0.02 NG/ML
PROT SERPL-MCNC: 6.5 G/DL (ref 6–8.4)
PROT UR QL STRIP: ABNORMAL
RBC # BLD AUTO: 3.8 M/UL (ref 4–5.4)
RBC #/AREA URNS HPF: >100 /HPF (ref 0–4)
SAMPLE: ABNORMAL
SAMPLE: ABNORMAL
SARS-COV-2 RDRP RESP QL NAA+PROBE: NEGATIVE
SITE: ABNORMAL
SITE: ABNORMAL
SODIUM SERPL-SCNC: 140 MMOL/L (ref 136–145)
SP GR UR STRIP: 1.01 (ref 1–1.03)
TROPONIN I SERPL DL<=0.01 NG/ML-MCNC: 0.01 NG/ML (ref 0–0.03)
URN SPEC COLLECT METH UR: ABNORMAL
UROBILINOGEN UR STRIP-ACNC: NEGATIVE EU/DL
WBC # BLD AUTO: 5.64 K/UL (ref 3.9–12.7)
WBC #/AREA URNS HPF: 12 /HPF (ref 0–5)
YEAST URNS QL MICRO: ABNORMAL

## 2023-08-08 PROCEDURE — 93010 ELECTROCARDIOGRAM REPORT: CPT | Mod: ,,, | Performed by: INTERNAL MEDICINE

## 2023-08-08 PROCEDURE — 27000190 HC CPAP FULL FACE MASK W/VALVE

## 2023-08-08 PROCEDURE — 63600175 PHARM REV CODE 636 W HCPCS: Performed by: STUDENT IN AN ORGANIZED HEALTH CARE EDUCATION/TRAINING PROGRAM

## 2023-08-08 PROCEDURE — 83735 ASSAY OF MAGNESIUM: CPT | Performed by: STUDENT IN AN ORGANIZED HEALTH CARE EDUCATION/TRAINING PROGRAM

## 2023-08-08 PROCEDURE — 96366 THER/PROPH/DIAG IV INF ADDON: CPT

## 2023-08-08 PROCEDURE — 83605 ASSAY OF LACTIC ACID: CPT | Performed by: STUDENT IN AN ORGANIZED HEALTH CARE EDUCATION/TRAINING PROGRAM

## 2023-08-08 PROCEDURE — 25000003 PHARM REV CODE 250: Performed by: STUDENT IN AN ORGANIZED HEALTH CARE EDUCATION/TRAINING PROGRAM

## 2023-08-08 PROCEDURE — 84145 PROCALCITONIN (PCT): CPT | Performed by: STUDENT IN AN ORGANIZED HEALTH CARE EDUCATION/TRAINING PROGRAM

## 2023-08-08 PROCEDURE — 85025 COMPLETE CBC W/AUTO DIFF WBC: CPT | Performed by: STUDENT IN AN ORGANIZED HEALTH CARE EDUCATION/TRAINING PROGRAM

## 2023-08-08 PROCEDURE — 94660 CPAP INITIATION&MGMT: CPT

## 2023-08-08 PROCEDURE — 87086 URINE CULTURE/COLONY COUNT: CPT | Performed by: STUDENT IN AN ORGANIZED HEALTH CARE EDUCATION/TRAINING PROGRAM

## 2023-08-08 PROCEDURE — 99900035 HC TECH TIME PER 15 MIN (STAT)

## 2023-08-08 PROCEDURE — 83880 ASSAY OF NATRIURETIC PEPTIDE: CPT | Performed by: STUDENT IN AN ORGANIZED HEALTH CARE EDUCATION/TRAINING PROGRAM

## 2023-08-08 PROCEDURE — 81000 URINALYSIS NONAUTO W/SCOPE: CPT | Performed by: STUDENT IN AN ORGANIZED HEALTH CARE EDUCATION/TRAINING PROGRAM

## 2023-08-08 PROCEDURE — 82803 BLOOD GASES ANY COMBINATION: CPT | Mod: 91

## 2023-08-08 PROCEDURE — 96365 THER/PROPH/DIAG IV INF INIT: CPT

## 2023-08-08 PROCEDURE — 87635 SARS-COV-2 COVID-19 AMP PRB: CPT | Performed by: STUDENT IN AN ORGANIZED HEALTH CARE EDUCATION/TRAINING PROGRAM

## 2023-08-08 PROCEDURE — 99285 EMERGENCY DEPT VISIT HI MDM: CPT | Mod: 25

## 2023-08-08 PROCEDURE — 80053 COMPREHEN METABOLIC PANEL: CPT | Performed by: STUDENT IN AN ORGANIZED HEALTH CARE EDUCATION/TRAINING PROGRAM

## 2023-08-08 PROCEDURE — 96375 TX/PRO/DX INJ NEW DRUG ADDON: CPT

## 2023-08-08 PROCEDURE — 84484 ASSAY OF TROPONIN QUANT: CPT | Performed by: STUDENT IN AN ORGANIZED HEALTH CARE EDUCATION/TRAINING PROGRAM

## 2023-08-08 PROCEDURE — 93010 EKG 12-LEAD: ICD-10-PCS | Mod: ,,, | Performed by: INTERNAL MEDICINE

## 2023-08-08 PROCEDURE — 94761 N-INVAS EAR/PLS OXIMETRY MLT: CPT

## 2023-08-08 PROCEDURE — 93005 ELECTROCARDIOGRAM TRACING: CPT

## 2023-08-08 RX ORDER — POTASSIUM CHLORIDE 7.45 MG/ML
10 INJECTION INTRAVENOUS
Status: COMPLETED | OUTPATIENT
Start: 2023-08-08 | End: 2023-08-08

## 2023-08-08 RX ORDER — FUROSEMIDE 10 MG/ML
100 INJECTION INTRAMUSCULAR; INTRAVENOUS
Status: COMPLETED | OUTPATIENT
Start: 2023-08-08 | End: 2023-08-08

## 2023-08-08 RX ORDER — CEPHALEXIN 500 MG/1
500 CAPSULE ORAL 4 TIMES DAILY
Qty: 20 CAPSULE | Refills: 0 | Status: SHIPPED | OUTPATIENT
Start: 2023-08-08 | End: 2023-08-13

## 2023-08-08 RX ADMIN — POTASSIUM CHLORIDE 10 MEQ: 7.46 INJECTION, SOLUTION INTRAVENOUS at 02:08

## 2023-08-08 RX ADMIN — FUROSEMIDE 100 MG: 10 INJECTION, SOLUTION INTRAMUSCULAR; INTRAVENOUS at 02:08

## 2023-08-08 NOTE — ED NOTES
RN first encounter with pt. Pt brought to room via wheel chair. Pt has subrapubic urinary catheter in place. Pt is aao x 4. Pt able to transfer from wheel chair to stretcher with assistance. Pt complaining of shortness of breath. Pt is supposed to be on 2L Nc at home, pt arrived without home o2. Pt sating 89% on RA. Pt placed on 2L NC, pt sating 98%. Pt reports chest pain, chills, and productive cough. Pt states the last time she felt this way she had fluid build up in her lungs that caused pneumonia. Pts legs are extremely swollen and weeping. Pt states home health wrapped legs today. VSS. Pt connected to cardiac monitoring, automatic bp cuff, and cont pulse ox.

## 2023-08-08 NOTE — DISCHARGE INSTRUCTIONS
Thank you for coming to our Emergency Department today. It is important to remember that some problems are difficult to diagnose and may not be found during your first visit. Be sure to follow up with your primary care doctor and review any labs/imaging that was performed with them. If you do not have a primary care doctor, you may contact the one listed on your discharge paperwork or you may also call the [facility] Appointment Desk at [phone] to schedule an appointment with one.     All medications may potentially have side effects and it is impossible to predict which medications may give you side effects. If you feel that you are having a negative effect of any medication you should immediately stop taking them and seek medical attention.    Return to the ER with any questions/concerns, new/concerning symptoms, worsening or failure to improve. Do not drive or make any important decisions for 24 hours if you have received any pain medications, sedatives or mood altering drugs during your ER visit.

## 2023-08-08 NOTE — ED PROVIDER NOTES
Encounter Date: 8/8/2023       History     Chief Complaint   Patient presents with    Shortness of Breath     Patient states she had pneumonia a couple of months ago and feels like she has it again. She reports dyspnea on exertion, chest pain and cough with white and green sputum. Denies fever.      67-year-old female with history of CHF, chronic bilateral lower extremity edema, CAD hypertension, hyperlipidemia, lumbar radiculopathy, suprapubic catheter dependent presents to the emergency department with shortness of breath and chest pain that has been going on for the past 2 days.  Patient states she was recently diagnosed with a pneumonia and feels like she is having the same symptoms.  She states she is that is productive of clear sputum.  Denies any fevers or chills denies any recent sick contacts.  She reports her daughters and nurse practitioner puts all her medications in a pill organizer her  administers all of her medications.  Has not missed pills.  She has home health that comes and recently changed her compression stocking her lower extremities.  She denies any abdominal pain nausea or vomiting.  Denies any changes of the amount she is urinating.  Patient denies any changes in her fluid or salt intake.  Although her  reports she is been drinking soda (7up) multiple times throughout the day. She states she recently had a procedure to replace her baclofen pump in his currently on antibiotics (Keflex).    The history is provided by the patient and the spouse.     Review of patient's allergies indicates:   Allergen Reactions    (d)-limonene flavor      Other reaction(s): difficult intubation  Other reaction(s): Difficulty breathing    Bactrim [sulfamethoxazole-trimethoprim] Anaphylaxis    Benadryl [diphenhydramine hcl] Shortness Of Breath    Fentanyl Itching, Nausea And Vomiting and Swelling             Imitrex [sumatriptan succinate] Shortness Of Breath    Percocet [oxycodone-acetaminophen]  Shortness Of Breath    Topamax [topiramate] Shortness Of Breath    Vancomycin Anaphylaxis     Rash    Butorphanol tartrate     Darvocet a500 [propoxyphene n-acetaminophen]      Other reaction(s): Difficulty breathing    Evening primrose (oenothera biennis)     Lyrica [pregabalin] Other (See Comments)     tremors    White petrolatum-zinc     Zinc oxide-white petrolatum      Other reaction(s): Difficulty breathing    Latex, natural rubber Itching and Rash    Phenytoin Rash and Other (See Comments)     Trouble breathing     Past Medical History:   Diagnosis Date    Anxiety     Arthritis     Bilateral lower extremity edema     severe chronic    Carotid artery occlusion     Cataract     CHF (congestive heart failure)     Coronary artery disease     subtotalled LAD with collateral    Depression     Fever blister     Hard of hearing     Hypokalemia 01/09/2023    Hyponatremia 02/04/2022    Hypothyroid     Iron deficiency anemia     Lumbar radiculopathy     with chronic pain    Ocular migraine     Other emphysema 05/22/2023    Renal disorder     Sleep apnea     cpap     Past Surgical History:   Procedure Laterality Date    ADENOIDECTOMY      BACK SURGERY      x 12    CARDIAC CATHETERIZATION  2016    subtotalled LAD with right to left collaterals    CATARACT EXTRACTION W/  INTRAOCULAR LENS IMPLANT Left     Dr Coleman     CYSTOSCOPIC LITHOLAPAXY N/A 6/27/2019    Procedure: CYSTOLITHOLAPAXY;  Surgeon: Shireen Mayo MD;  Location: [facility] OR [address] [place] [address];  Service: Urology;  Laterality: N/A;    CYSTOSCOPIC LITHOLAPAXY N/A 9/3/2019    Procedure: CYSTOLITHOLAPAXY;  Surgeon: Shireen Mayo MD;  Location: [facility] OR 2ND FLR;  Service: Urology;  Laterality: N/A;    CYSTOSCOPY N/A 7/13/2021    Procedure: CYSTOSCOPY;  Surgeon: Shireen Mayo MD;  [facility];  Service: Urology;  Laterality: N/A;    CYSTOSCOPY  11/16/2021    Procedure: CYSTOSCOPY;  Surgeon: Shireen Mayo MD;  Location: [facility] OR [address] [place] [address];  Service:  Urology;;    CYSTOSCOPY  7/19/2022    Procedure: CYSTOSCOPY;  Surgeon: Shireen Mayo MD;  Location: [facility] OR [facility] FLR;  Service: Urology;;    CYSTOSCOPY WITH INJECTION OF PERIURETHRAL BULKING AGENT  7/19/2022    Procedure: CYSTOSCOPY, WITH PERIURETHRAL BULKING AGENT INJECTION-MACROPLASTIQUE;  Surgeon: Shireen Mayo MD;  [facility];  Service: Urology;;    CYSTOSCOPY WITH INJECTION OF PERIURETHRAL BULKING AGENT N/A 3/28/2023    Procedure: CYSTOSCOPY, WITH PERIURETHRAL BULKING AGENT INJECTION;  Surgeon: Shireen Mayo MD;  Location: [facility] OR [facility] FLR;  Service: Urology;  Laterality: N/A;  [place]    CYSTOSCOPY,WITH BOTULINUM TOXIN INJECTION N/A 12/13/2022    Procedure: CYSTOSCOPY,WITH BOTULINUM TOXIN INJECTION;  Surgeon: Shireen Mayo MD;  [facility];  Service: Urology;  Laterality: N/A;  300 U    CYSTOSCOPY,WITH BOTULINUM TOXIN INJECTION N/A 3/28/2023    Procedure: CYSTOSCOPY,WITH BOTULINUM TOXIN INJECTION;  Surgeon: Shireen Mayo MD;  [facility];  Service: Urology;  Laterality: N/A;  45 MIN.    300 UNITS    ESOPHAGOGASTRODUODENOSCOPY N/A 5/23/2018    Procedure: ESOPHAGOGASTRODUODENOSCOPY (EGD);  Surgeon: Prince Vance MD;  Location: [facility] (4TH FLR);  Service: Endoscopy;  Laterality: N/A;  r/s 'd per Dr. Vance due to family emergency- ER    ESOPHAGOGASTRODUODENOSCOPY N/A 6/23/2023    Procedure: EGD (ESOPHAGOGASTRODUODENOSCOPY);  Surgeon: Juaquin Barry MD;  Location: [facility] ENDO (2ND FLR);  Service: Endoscopy;  Laterality: N/A;  Refferal: PRINCE VANCE  tele [facility] 5/18/23  dx: history of a Nissen fundoplication  Additional Scheduling Information:  On home oxygen 2nd floor for airway protection also with a pain pump elevated BMI close to 40   Prep Gastroparesis   ins    HYSTERECTOMY  1975    endometriosis    INJECTION OF BOTULINUM TOXIN TYPE A  7/13/2021    Procedure: INJECTION, BOTULINUM TOXIN, 200units;  Surgeon: Shireen Mayo MD;   [facility];  Service: Urology;;    INJECTION OF BOTULINUM TOXIN TYPE A  11/16/2021    Procedure: INJECTION, BOTULINUM TOXIN, 200units;  Surgeon: Shireen Mayo MD;  [facility];  Service: Urology;;    INJECTION OF BOTULINUM TOXIN TYPE A  7/19/2022    Procedure: INJECTION, BOTULINUM TOXIN, 300 units ;  Surgeon: Shireen Mayo MD;  [facility];  Service: Urology;;    INSERTION, SUPRAPUBIC CATHETER N/A 12/13/2022    Procedure: INSERTION, SUPRAPUBIC CATHETER;  Surgeon: Shireen Mayo MD;  [facility];  Service: Urology;  Laterality: N/A;  exchange    pain pump placement      SQ Dilaudid Pump managed by Dr. Hillman, Pain Management    REMOVAL OF BONE SPUR OF FOOT Bilateral 9/16/2022    Procedure: EXCISION ARTHRITIC BONE, BILATERAL FOOT;  Surgeon: Adam Mcguire [facility]  Location: [facility] OR;  Service: Podiatry;  Laterality: Bilateral;    REPLACEMENT OF BACLOFEN PUMP N/A 8/2/2023    Procedure: REPLACEMENT, BACLOFEN PUMP;  Surgeon: Smitha Condon MD;  Location: [facility] OR 2ND FLR;  Service: Neurosurgery;  Laterality: N/A;  Anes: Gen  Blood: Type & Screen  Pos: Left Lat  Intrathecal Pump  Bed: Reg Reversed  Rad: C-arm  Pump: 40 cc, [employer]tronics    REPLACEMENT OF CATHETER N/A 10/31/2019    Procedure: REPLACEMENT, CATHETER-SUPRAPUBIC;  Surgeon: Shireen Mayo MD;  Location: [facility] OR [address] [place] [address];  Service: Urology;  Laterality: N/A;    SPINAL CORD STIMULATOR REMOVAL      before [place]    SPINE SURGERY  5-13-13    CERVICAL FUSION    TONSILLECTOMY       Family History   Problem Relation Age of Onset    Cancer Mother 55        breast    Cancer Father         esophagus,had laryngectomy    Esophageal cancer Father     Parkinsonism Maternal Grandmother     Tremor Maternal Grandmother     No Known Problems Brother     No Known Problems Brother     Heart disease Maternal Uncle     Colon cancer Maternal Uncle         Less than 60    No Known Problems Sister     No Known  "Problems Maternal Aunt     Cirrhosis Paternal Aunt         ETOH    Liver disease Paternal Aunt         ETOH    Liver disease Paternal Uncle         ETOH    Cirrhosis Paternal Uncle         ETOH    No Known Problems Maternal Grandfather     No Known Problems Paternal Grandmother     No Known Problems Paternal Grandfather     Melanoma Neg Hx     Amblyopia Neg Hx     Blindness Neg Hx     Cataracts Neg Hx     Diabetes Neg Hx     Glaucoma Neg Hx     Hypertension Neg Hx     Macular degeneration Neg Hx     Retinal detachment Neg Hx     Strabismus Neg Hx     Stroke Neg Hx     Thyroid disease Neg Hx     Celiac disease Neg Hx     Colon polyps Neg Hx     Cystic fibrosis Neg Hx     Crohn's disease Neg Hx     Inflammatory bowel disease Neg Hx     Liver cancer Neg Hx     Rectal cancer Neg Hx     Stomach cancer Neg Hx     Ulcerative colitis Neg Hx     Lymphoma Neg Hx      Social History     Tobacco Use    Smoking status: Never    Smokeless tobacco: Never   Substance Use Topics    Alcohol use: Never    Drug use: Yes     Types: Marijuana     Comment: "medical marijuana gummies"     Review of Systems   Constitutional:  Negative for chills and fever.   HENT:  Negative for congestion and rhinorrhea.    Eyes:  Negative for pain.   Respiratory:  Positive for cough and shortness of breath.    Cardiovascular:  Positive for chest pain and leg swelling.   Gastrointestinal:  Negative for abdominal pain, nausea and vomiting.   Endocrine: Negative for polyuria.   Genitourinary:  Negative for dysuria and hematuria.   Musculoskeletal:  Negative for gait problem and neck pain.   Skin:  Negative for rash.   Allergic/Immunologic: Negative for immunocompromised state.   Neurological:  Negative for weakness and headaches.       Physical Exam     Initial Vitals [08/08/23 0042]   BP Pulse Resp Temp SpO2   (!) 110/52 74 20 97.7 °F (36.5 °C) (!) 89 %      MAP       --         Physical Exam    Nursing note and vitals reviewed.  Constitutional: She is not " diaphoretic. She is Obese . No distress.   HENT:   Head: Normocephalic and atraumatic.   Eyes: Conjunctivae and EOM are normal. Pupils are equal, round, and reactive to light.   Neck:   Normal range of motion.  Cardiovascular:  Regular rhythm.           Pulmonary/Chest: Breath sounds normal. No respiratory distress.   Abdominal: Abdomen is soft. Bowel sounds are normal. She exhibits no distension. There is no abdominal tenderness.     There is no guarding.   Musculoskeletal:         General: Edema present. No tenderness. Normal range of motion.      Cervical back: Normal range of motion.      Right lower leg: 3+ Edema present.      Left lower leg: 3+ Edema present.     Lymphadenopathy:     She has no cervical adenopathy.   Neurological: She is alert.   Skin: Skin is warm. Capillary refill takes less than 2 seconds.   Psychiatric: She has a normal mood and affect. Her behavior is normal.         ED Course   Procedures  Labs Reviewed   CBC W/ AUTO DIFFERENTIAL - Abnormal; Notable for the following components:       Result Value    RBC 3.80 (*)     Hemoglobin 9.9 (*)     Hematocrit 31.5 (*)     MCH 26.1 (*)     MCHC 31.4 (*)     RDW 14.9 (*)     All other components within normal limits   COMPREHENSIVE METABOLIC PANEL - Abnormal; Notable for the following components:    Potassium 3.0 (*)     BUN 5 (*)     Albumin 3.1 (*)     ALT 5 (*)     All other components within normal limits   URINALYSIS, REFLEX TO URINE CULTURE - Abnormal; Notable for the following components:    Protein, UA Trace (*)     Occult Blood UA 3+ (*)     Leukocytes, UA 1+ (*)     All other components within normal limits    Narrative:     Specimen Source->Urine   URINALYSIS MICROSCOPIC - Abnormal; Notable for the following components:    RBC, UA >100 (*)     WBC, UA 12 (*)     Yeast, UA Occasional (*)     Hyaline Casts, UA 11 (*)     All other components within normal limits    Narrative:     Specimen Source->Urine   ISTAT PROCEDURE - Abnormal; Notable  for the following components:    POC PH 7.334 (*)     POC PCO2 63.7 (*)     POC HCO3 33.9 (*)     POC SATURATED O2 72 (*)     POC TCO2 36 (*)     All other components within normal limits   ISTAT PROCEDURE - Abnormal; Notable for the following components:    POC PH 7.325 (*)     POC PCO2 63.9 (*)     POC HCO3 33.3 (*)     POC SATURATED O2 75 (*)     POC TCO2 35 (*)     All other components within normal limits   CULTURE, URINE   TROPONIN I   B-TYPE NATRIURETIC PEPTIDE   MAGNESIUM   LACTIC ACID, PLASMA   PROCALCITONIN   SARS-COV-2 RDRP GENE     EKG Readings: (Independently Interpreted)   Independent Interpretation of EKG:  Rhythm: Sinus   Rate: 74  QTC: 499  No STEMI  Nonspecific T-wave abnormalities.        Imaging Results              X-Ray Chest AP Portable (Final result)  Result time 08/08/23 02:00:35      Final result by Josh Hernadez MD (08/08/23 02:00:35)                   Impression:      No acute findings evident by plain film of the chest.      Electronically signed by: Josh Hernadez  Date:    08/08/2023  Time:    02:00               Narrative:    EXAMINATION:  XR CHEST AP PORTABLE    CLINICAL HISTORY:  Shortness of breath    TECHNIQUE:  Single frontal view of the chest was performed.    COMPARISON:  07/08/2023    FINDINGS:  Cardiac silhouette appears stable.  Mixed density structure in the retrocardiac region suggests hiatal hernia.  The lungs otherwise appear clear.  ACDF plate incompletely imaged.  Otherwise bones appear stable with stimulator over the lower thoracic spine.                                       Medications   potassium bicarbonate disintegrating tablet 50 mEq (50 mEq Oral Not Given 8/8/23 0419)   potassium chloride 10 mEq in 100 mL IVPB (0 mEq Intravenous Stopped 8/8/23 0414)   furosemide injection 100 mg (100 mg Intravenous Given 8/8/23 0231)     Medical Decision Making:   History:   Old Medical Records: I decided to obtain old medical records.  Initial Assessment:   67-year-old  female with history of CHF, chronic bilateral lower extremity edema, CAD hypertension, hyperlipidemia, lumbar radiculopathy, suprapubic catheter dependent presents to the emergency department with shortness of breath and chest pain that has been going on for the past 2 days.  Patient in no acute distress, no significant respiratory distress.  Lungs clear to auscultation bilaterally.  Exam with chronic lower extremity.  Abdomen soft nontender.  Suprapubic catheter in place clean and dry without signs of surrounding infection.  Suspect patient's symptoms may be secondary to volume overload given recent history of increase intake of soda.  EKG with no acute abnormalities (no STEMI) given onset of symptoms send 1 set of troponin to assess for any signs of NSTEMI.  Will obtain chest x-ray to assess for pulmonary edema and obtain blood work to assess for possible signs of systemic infection volume overload.  Low suspicion for pneumonia will obtain lactic acid procalcitonin, COVID and chest x-ray.  Differential Diagnosis:   MI/ACS, pneumothorax, pericardial effusion/tamonade, pneumonia, lung abscess, pericarditis/myocarditis, pleural effusion, lung mass, CHF exacerbation, asthma exacerbation, COPD exacerbation, aspirated/ingested foreign body, airway obstruction, CO poisoning, anemia, metabolic derangement, allergy/atopy, influenza, viral URI, viral syndrome.    Clinical Tests:   Lab Tests: Ordered and Reviewed  Radiological Study: Ordered and Reviewed  Medical Tests: Ordered and Reviewed             ED Course as of 08/08/23 0454   Tue Aug 08, 2023   0215 Potassium(!): 3.0  CBC without significant leukocytosis, anemia (at baseline), or platelet abnormalities.  Chem 14 negative for hypo-or hyper natremia, chloridemia, or other electrolyte abnormalities; BUN and creatinine (at baseline), ALT and AST were within normal limits indicating normal liver function.  Potassium of 3.0 will repeat p.o. and IV.  Likely from diuretic  use.  Chest x-ray without significant evidence of volume overload. [AS]   0233 Troponin I: 0.006  Troponin within normal limits.  Lactic acid normal.  Magnesium normal.  VBG with mild hypercapnia likely because patient needs to be on CPAP at night in his currently in the emergency department.  Initiate BiPAP while we are awaiting completion of her workup.  Per chart review patient with a course of Keflex after her procedure on 8/2/23.   does not believe she is been taking it as he did not get the prescription filled he reports he will have to check with his daughter if she is been taking it.  Will write a prescription just in case they need to retake it. [AS]   0428 Patient declined potassium stating she does not want it.  To have diet with high potassium intake.  Recommended decreased use of soda (7up) Pt is currently stable for discharge.   I see no indication of an emergent process beyond that addressed during our encounter but have duly counseled the patient/family regarding the need for prompt follow-up as well as the indications that should prompt immediate return to the emergency room should new or worrisome developments occur. I discussed the ED work up and diagnostic findings with the patient. The patient/family has been provided with verbal and printed direction regarding our final diagnosis(es) as well as instructions regarding use of OTC and/or Rx medications intended to manage the patient's aforementioned conditions. The patient/family verbalized an understanding. The patient/family is asked if there are any questions or concerns. We discuss the case, until all issues are addressed to the patient/family's satisfaction. Patient/family understands and is agreeable to the plan.  [AS]      ED Course User Index  [AS] Karthik Lacey MD               DISCLAIMER: This note was prepared with [employer] voice recognition transcription software. Garbled syntax, mangled pronouns, and other bizarre  constructions may be attributed to that software system.  Clinical Impression:   Final diagnoses:  [R07.9] Chest pain  [R06.02] SOB (shortness of breath) (Primary)  [R31.9] Hematuria, unspecified type  [E87.6] Hypokalemia        ED Disposition Condition    Discharge Stable          ED Prescriptions       Medication Sig Dispense Start Date End Date Auth. Provider    cephALEXin (KEFLEX) 500 MG capsule Take 1 capsule (500 mg total) by mouth 4 (four) times daily. for 5 days 20 capsule 8/8/2023 8/13/2023 Karthik Lacey MD          Follow-up Information       Follow up With Specialties Details Why Contact Info    Mesfin Hodges II, MD Internal Medicine Schedule an appointment as soon as possible for a visit  for reassesment [address]  [place] [address]  [phone]      [place] - Emergency Dept Emergency Medicine  If symptoms worsen [address] [facility] [address] [phone]             Karthik Lacey MD  08/08/23 2141

## 2023-08-09 LAB
BACTERIA UR CULT: NORMAL
BACTERIA UR CULT: NORMAL

## 2023-08-10 ENCOUNTER — TELEPHONE (OUTPATIENT)
Dept: INTERNAL MEDICINE | Facility: CLINIC | Age: 68
End: 2023-08-10
Payer: MEDICARE

## 2023-08-10 DIAGNOSIS — N39.0 RECURRENT UTI: ICD-10-CM

## 2023-08-10 DIAGNOSIS — Z93.59 SUPRAPUBIC CATHETER: Primary | ICD-10-CM

## 2023-08-10 NOTE — PROGRESS NOTES
Outpatient Care Management   - Care Plan Follow Up    Patient: Tasha Hawley  MRN:  [id number]  Date of Service:  8/10/2023  Completed by:  Jaycee Caro LMSW  Referral Date: 04/17/2023    Reason for Visit   Patient presents with    Other     8/3/2023  1st attempt to complete Follow-Up  for Outpatient Care Management, left message.  Will send portal unable to assess letter.        Case Closure     8/10/2023       Brief Summary: SW followed up with patient via telephone. She stated The Medical Team has been coming out to do wound care for her legs. Pt denied any other issues or concerns. SW closed case, notified OPCM RN.     Complex Care Plan     Care plan was discussed and completed today with input from patient and/or caregiver.    Patient Instructions     No follow-ups on file.

## 2023-08-10 NOTE — TELEPHONE ENCOUNTER
----- Message from Gabrielle Winters sent at 8/10/2023  9:59 AM CDT -----  Type:  Patient Requesting Referral    Who Called:pt  Referral to What Specialty:Urology  Reason for Referral:want to new doctor  Does the patient want the referral with a specific physician?:Dr. Armstrong  [facility]  Patient Requesting a Response?:yes  Would the patient rather a call back or a response via [facility]? call  Best Call Back Number:[phone]  Additional Information:

## 2023-08-11 ENCOUNTER — OFFICE VISIT (OUTPATIENT)
Dept: CARDIOLOGY | Facility: CLINIC | Age: 68
End: 2023-08-11
Payer: MEDICARE

## 2023-08-11 VITALS
OXYGEN SATURATION: 94 % | WEIGHT: 222 LBS | HEIGHT: 64 IN | HEART RATE: 73 BPM | BODY MASS INDEX: 37.9 KG/M2 | DIASTOLIC BLOOD PRESSURE: 60 MMHG | SYSTOLIC BLOOD PRESSURE: 107 MMHG

## 2023-08-11 DIAGNOSIS — G47.39 OTHER SLEEP APNEA: ICD-10-CM

## 2023-08-11 DIAGNOSIS — R07.9 CHEST PAIN, UNSPECIFIED TYPE: ICD-10-CM

## 2023-08-11 DIAGNOSIS — I70.0 THORACIC AORTA ATHEROSCLEROSIS: ICD-10-CM

## 2023-08-11 DIAGNOSIS — F11.20 NARCOTIC DEPENDENCY, CONTINUOUS: Chronic | ICD-10-CM

## 2023-08-11 DIAGNOSIS — K21.00 GASTROESOPHAGEAL REFLUX DISEASE WITH ESOPHAGITIS WITHOUT HEMORRHAGE: ICD-10-CM

## 2023-08-11 DIAGNOSIS — I25.119 CORONARY ARTERY DISEASE INVOLVING NATIVE CORONARY ARTERY OF NATIVE HEART WITH ANGINA PECTORIS: Primary | ICD-10-CM

## 2023-08-11 DIAGNOSIS — D50.8 OTHER IRON DEFICIENCY ANEMIA: ICD-10-CM

## 2023-08-11 DIAGNOSIS — R00.1 BRADYCARDIA: ICD-10-CM

## 2023-08-11 DIAGNOSIS — J96.12 CHRONIC RESPIRATORY FAILURE WITH HYPOXIA AND HYPERCAPNIA: ICD-10-CM

## 2023-08-11 DIAGNOSIS — I77.9 BILATERAL CAROTID ARTERY DISEASE, UNSPECIFIED TYPE: ICD-10-CM

## 2023-08-11 DIAGNOSIS — R60.0 BILATERAL LEG EDEMA: ICD-10-CM

## 2023-08-11 DIAGNOSIS — I89.0 LYMPHEDEMA: ICD-10-CM

## 2023-08-11 DIAGNOSIS — E78.00 PURE HYPERCHOLESTEROLEMIA: ICD-10-CM

## 2023-08-11 DIAGNOSIS — M79.604 LEG PAIN, BILATERAL: ICD-10-CM

## 2023-08-11 DIAGNOSIS — R29.6 FREQUENT FALLS: ICD-10-CM

## 2023-08-11 DIAGNOSIS — R06.02 SOB (SHORTNESS OF BREATH): ICD-10-CM

## 2023-08-11 DIAGNOSIS — I27.29 PULMONARY HYPERTENSION DUE TO DIASTOLIC SYSTEMIC VENTRICULAR DYSFUNCTION: ICD-10-CM

## 2023-08-11 DIAGNOSIS — E03.9 HYPOTHYROIDISM (ACQUIRED): ICD-10-CM

## 2023-08-11 DIAGNOSIS — I51.9 PULMONARY HYPERTENSION DUE TO DIASTOLIC SYSTEMIC VENTRICULAR DYSFUNCTION: ICD-10-CM

## 2023-08-11 DIAGNOSIS — R09.89 PULMONARY AIR TRAPPING: ICD-10-CM

## 2023-08-11 DIAGNOSIS — M79.605 LEG PAIN, BILATERAL: ICD-10-CM

## 2023-08-11 DIAGNOSIS — J96.11 CHRONIC RESPIRATORY FAILURE WITH HYPOXIA AND HYPERCAPNIA: ICD-10-CM

## 2023-08-11 DIAGNOSIS — I50.20 SYSTOLIC CONGESTIVE HEART FAILURE, UNSPECIFIED HF CHRONICITY: ICD-10-CM

## 2023-08-11 PROCEDURE — 3008F PR BODY MASS INDEX (BMI) DOCUMENTED: ICD-10-PCS | Mod: CPTII,,, | Performed by: INTERNAL MEDICINE

## 2023-08-11 PROCEDURE — 3044F HG A1C LEVEL LT 7.0%: CPT | Mod: CPTII,,, | Performed by: INTERNAL MEDICINE

## 2023-08-11 PROCEDURE — 3074F SYST BP LT 130 MM HG: CPT | Mod: CPTII,,, | Performed by: INTERNAL MEDICINE

## 2023-08-11 PROCEDURE — 99215 PR OFFICE/OUTPT VISIT, EST, LEVL V, 40-54 MIN: ICD-10-PCS | Mod: S$PBB,,, | Performed by: INTERNAL MEDICINE

## 2023-08-11 PROCEDURE — 3078F PR MOST RECENT DIASTOLIC BLOOD PRESSURE < 80 MM HG: ICD-10-PCS | Mod: CPTII,,, | Performed by: INTERNAL MEDICINE

## 2023-08-11 PROCEDURE — 99215 OFFICE O/P EST HI 40 MIN: CPT | Mod: S$PBB,,, | Performed by: INTERNAL MEDICINE

## 2023-08-11 PROCEDURE — 1101F PT FALLS ASSESS-DOCD LE1/YR: CPT | Mod: CPTII,,, | Performed by: INTERNAL MEDICINE

## 2023-08-11 PROCEDURE — 99999 PR PBB SHADOW E&M-EST. PATIENT-LVL V: ICD-10-PCS | Mod: PBBFAC,,, | Performed by: INTERNAL MEDICINE

## 2023-08-11 PROCEDURE — 3074F PR MOST RECENT SYSTOLIC BLOOD PRESSURE < 130 MM HG: ICD-10-PCS | Mod: CPTII,,, | Performed by: INTERNAL MEDICINE

## 2023-08-11 PROCEDURE — 3078F DIAST BP <80 MM HG: CPT | Mod: CPTII,,, | Performed by: INTERNAL MEDICINE

## 2023-08-11 PROCEDURE — 3288F PR FALLS RISK ASSESSMENT DOCUMENTED: ICD-10-PCS | Mod: CPTII,,, | Performed by: INTERNAL MEDICINE

## 2023-08-11 PROCEDURE — 99999 PR PBB SHADOW E&M-EST. PATIENT-LVL V: CPT | Mod: PBBFAC,,, | Performed by: INTERNAL MEDICINE

## 2023-08-11 PROCEDURE — 3008F BODY MASS INDEX DOCD: CPT | Mod: CPTII,,, | Performed by: INTERNAL MEDICINE

## 2023-08-11 PROCEDURE — 3288F FALL RISK ASSESSMENT DOCD: CPT | Mod: CPTII,,, | Performed by: INTERNAL MEDICINE

## 2023-08-11 PROCEDURE — 1101F PR PT FALLS ASSESS DOC 0-1 FALLS W/OUT INJ PAST YR: ICD-10-PCS | Mod: CPTII,,, | Performed by: INTERNAL MEDICINE

## 2023-08-11 PROCEDURE — 3044F PR MOST RECENT HEMOGLOBIN A1C LEVEL <7.0%: ICD-10-PCS | Mod: CPTII,,, | Performed by: INTERNAL MEDICINE

## 2023-08-11 RX ORDER — FUROSEMIDE 40 MG/1
80 TABLET ORAL 2 TIMES DAILY
Qty: 360 TABLET | Refills: 3 | Status: SHIPPED | OUTPATIENT
Start: 2023-08-11

## 2023-08-11 RX ORDER — DARIFENACIN 7.5 MG/1
7.5 TABLET, EXTENDED RELEASE ORAL
COMMUNITY
Start: 2023-07-20 | End: 2023-12-27

## 2023-08-11 NOTE — PROGRESS NOTES
Subjective:    Patient ID:  Tasha Hawley is a 68 y.o. female who presents for follow-up of Chest Pain      Seen by Dr Ceballos in the past. Per his note 8/2022:       She is here for clearance related to bone spurs and podiatry issues of both lower extremities.  Her feet are wrapped.  The surgery has not been scheduled yet.  She has no history of peripheral arterial disease.  She does not ambulate very much.  She is in a wheelchair.  She is with her  today.    She was hospitalized at [facility] for diastolic heart failure in leg swelling.  She is a cigarette smoker.  She had an Electrocardiogram in July which looked normal.  Her history includes previous cardiac catheterization which she does not remember.  2016 catheterization documents subtotaled left anterior descending artery with right-to-left collaterals.  She had a nuclear stress test June 2021 showing normal ejection fraction and normal perfusion study.  She has low blood pressure.  She is on furosemide for her leg swelling.  Her echo July 2022 confirms normal ejection fraction without valvular disease of significance.  She is on atorvastatin aspirin.  She denies chest pain.    8/11/2023:  Has worsening bilateral LE edema and erythema. Has what looks like venous stasis ulcers. Has CP/JONES/orthopnea. Compliant with meds. Drinks a lot of water.     Review of Systems   Constitutional: Negative for chills, decreased appetite, diaphoresis, fever, malaise/fatigue, night sweats, weight gain and weight loss.   HENT:  Positive for hearing loss. Negative for congestion, ear discharge, ear pain, hoarse voice, nosebleeds, odynophagia, sore throat, stridor and tinnitus.    Eyes:  Negative for blurred vision, discharge, double vision, pain, photophobia, redness, vision loss in left eye, vision loss in right eye, visual disturbance and visual halos.   Cardiovascular:  Positive for leg swelling. Negative for chest pain, claudication, cyanosis,  dyspnea on exertion, irregular heartbeat, near-syncope, orthopnea, palpitations, paroxysmal nocturnal dyspnea and syncope.   Respiratory:  Positive for cough and shortness of breath. Negative for hemoptysis, sleep disturbances due to breathing, snoring, sputum production and wheezing.    Endocrine: Negative for cold intolerance, heat intolerance, polydipsia, polyphagia and polyuria.   Hematologic/Lymphatic: Negative for adenopathy and bleeding problem. Does not bruise/bleed easily.   Skin:  Positive for poor wound healing. Negative for color change, dry skin, flushing, itching, nail changes, rash, skin cancer, suspicious lesions and unusual hair distribution.   Musculoskeletal:  Positive for joint pain. Negative for arthritis, back pain, falls, gout, joint swelling, muscle cramps, muscle weakness, myalgias, neck pain and stiffness.   Gastrointestinal:  Negative for bloating, abdominal pain, anorexia, change in bowel habit, bowel incontinence, constipation, diarrhea, dysphagia, excessive appetite, flatus, heartburn, hematemesis, hematochezia, hemorrhoids, jaundice, melena, nausea and vomiting.   Genitourinary:  Negative for bladder incontinence, decreased libido, dysuria, flank pain, frequency, genital sores, hematuria, hesitancy, incomplete emptying, nocturia and urgency.   Neurological:  Negative for aphonia, brief paralysis, difficulty with concentration, disturbances in coordination, excessive daytime sleepiness, dizziness, focal weakness, headaches, light-headedness, loss of balance, numbness, paresthesias, seizures, sensory change, tremors, vertigo and weakness.   Psychiatric/Behavioral:  Negative for altered mental status, depression, hallucinations, memory loss, substance abuse, suicidal ideas and thoughts of violence. The patient does not have insomnia and is not nervous/anxious.    Allergic/Immunologic: Negative for hives and persistent infections.        Objective:       Vitals:    08/11/23 1117   BP:  "107/60   BP Location: Left arm   Patient Position: Sitting   Pulse: 73   SpO2: (!) 94%   Weight: 100.7 kg (222 lb 0.1 oz)   Height: 5' 4" (1.626 m)    Physical Exam  Constitutional:       General: She is not in acute distress.     Appearance: She is well-developed. She is not diaphoretic.   HENT:      Head: Normocephalic and atraumatic.      Nose: Nose normal.   Eyes:      General: No scleral icterus.        Right eye: No discharge.      Conjunctiva/sclera: Conjunctivae normal.      Pupils: Pupils are equal, round, and reactive to light.   Neck:      Thyroid: No thyromegaly.      Vascular: No JVD.      Trachea: No tracheal deviation.   Cardiovascular:      Rate and Rhythm: Normal rate and regular rhythm.      Pulses:           Carotid pulses are 2+ on the right side and 2+ on the left side.       Radial pulses are 2+ on the right side and 2+ on the left side.        Dorsalis pedis pulses are 2+ on the right side and 2+ on the left side.        Posterior tibial pulses are 2+ on the right side and 2+ on the left side.      Heart sounds: Normal heart sounds. No murmur heard.     No friction rub. No gallop.   Pulmonary:      Effort: Pulmonary effort is normal. No respiratory distress.      Breath sounds: No stridor. Examination of the right-upper field reveals decreased breath sounds. Examination of the left-upper field reveals decreased breath sounds. Examination of the right-middle field reveals decreased breath sounds and rhonchi. Examination of the left-middle field reveals decreased breath sounds and rhonchi. Examination of the right-lower field reveals decreased breath sounds. Examination of the left-lower field reveals decreased breath sounds. Decreased breath sounds and rhonchi present. No wheezing or rales.   Chest:      Chest wall: No tenderness.   Abdominal:      General: Bowel sounds are normal. There is no distension.      Palpations: Abdomen is soft. There is no mass.      Tenderness: There is no abdominal " tenderness. There is no guarding or rebound.   Musculoskeletal:         General: No tenderness. Normal range of motion.      Cervical back: Normal range of motion and neck supple.      Right lower leg: Edema present.      Left lower leg: Edema present.   Lymphadenopathy:      Cervical: No cervical adenopathy.   Skin:     General: Skin is warm and dry.      Coloration: Skin is not pale.      Findings: No erythema or rash.   Neurological:      Mental Status: She is alert and oriented to person, place, and time.      Cranial Nerves: No cranial nerve deficit.      Coordination: Coordination normal.   Psychiatric:         Behavior: Behavior normal.         Thought Content: Thought content normal.         Judgment: Judgment normal.           Assessment:       1. Coronary artery disease involving native coronary artery of native heart with angina pectoris    2. Thoracic aorta atherosclerosis    3. Pure hypercholesterolemia    4. Pulmonary hypertension due to diastolic systemic ventricular dysfunction    5. Systolic congestive heart failure, unspecified HF chronicity    6. Bradycardia    7. Bilateral carotid artery disease, unspecified type    8. Chronic respiratory failure with hypoxia and hypercapnia    9. Other iron deficiency anemia    10. Hypothyroidism (acquired)    11. BMI 36.0-36.9,adult    12. Gastroesophageal reflux disease with esophagitis without hemorrhage    13. Leg pain, bilateral    14. Other sleep apnea    15. Lymphedema    16. Frequent falls    17. Chest pain, unspecified type    18. Bilateral leg edema    19. Narcotic dependency, continuous    20. SOB (shortness of breath)    21. Pulmonary air trapping      68 year old patient with hx and presentation as above. Has risk factors for having CAD and will evaluate symptoms with noninvasive cardiac stress imaging, 2DE, and venous doppler. Has multiple comorbidities and likely symptoms multifactorial. Increase lasix. Needs to lose weight. Discussed the  etiology, evaluation, and management of CHF, obesity, lymphedema, HTN, HLD, PHTN. Discussed the importance of med compliance, heart healthy diet, and regular exercise.        Plan:       -Nuclear SPECT  -2DE  -Bilateral LE venous doppler  -Increase lasix to 80 mg BID  -f/u in 1 month

## 2023-08-14 ENCOUNTER — CLINICAL SUPPORT (OUTPATIENT)
Dept: REHABILITATION | Facility: HOSPITAL | Age: 68
End: 2023-08-14
Attending: INTERNAL MEDICINE
Payer: MEDICARE

## 2023-08-14 ENCOUNTER — HOSPITAL ENCOUNTER (OUTPATIENT)
Dept: CARDIOLOGY | Facility: HOSPITAL | Age: 68
Discharge: HOME | End: 2023-08-14
Attending: INTERNAL MEDICINE
Payer: MEDICARE

## 2023-08-14 DIAGNOSIS — I89.0 LYMPHEDEMA: ICD-10-CM

## 2023-08-14 DIAGNOSIS — M79.604 LEG PAIN, BILATERAL: ICD-10-CM

## 2023-08-14 DIAGNOSIS — R60.0 BILATERAL LEG EDEMA: ICD-10-CM

## 2023-08-14 DIAGNOSIS — M79.605 LEG PAIN, BILATERAL: ICD-10-CM

## 2023-08-14 DIAGNOSIS — R13.10 DYSPHAGIA, UNSPECIFIED TYPE: ICD-10-CM

## 2023-08-14 LAB
LEFT GREAT SAPHENOUS DISTAL THIGH DIA: 0.31 CM
LEFT GREAT SAPHENOUS JUNCTION DIA: 0.42 CM
LEFT GREAT SAPHENOUS MIDDLE THIGH DIA: 0.42 CM
LEFT GREAT SAPHENOUS PROXIMAL CALF DIA: 0.28 CM
LEFT SMALL SAPHENOUS KNEE DIA: 0.35 CM
RIGHT GREAT SAPHENOUS DISTAL THIGH DIA: 0.47 CM
RIGHT GREAT SAPHENOUS JUNCTION DIA: 0.59 CM
RIGHT GREAT SAPHENOUS MIDDLE THIGH DIA: 0.46 CM
RIGHT SMALL SAPHENOUS KNEE DIA: 0.52 CM

## 2023-08-14 PROCEDURE — 93970 EXTREMITY STUDY: CPT | Mod: 26,,, | Performed by: INTERNAL MEDICINE

## 2023-08-14 PROCEDURE — 92612 ENDOSCOPY SWALLOW (FEES) VID: CPT | Mod: PO

## 2023-08-14 PROCEDURE — 93970 EXTREMITY STUDY: CPT | Mod: TC

## 2023-08-14 PROCEDURE — 93970 CV US LOWER VENOUS INSUFFICIENCY BILATERAL (CUPID ONLY): ICD-10-PCS | Mod: 26,,, | Performed by: INTERNAL MEDICINE

## 2023-08-14 PROCEDURE — 92610 EVALUATE SWALLOWING FUNCTION: CPT | Mod: PO

## 2023-08-14 NOTE — Clinical Note
Dr. Vance,  Thank you for the referral for Mrs. Aguila. She did present with mild-moderate orophagyneal dysphagia and would benefit from outpatient therapy. Could you place a referral to speech therapy if you agree? Thanks, Hope ALEXANDRE Huang, CCC-SLP Speech Language Pathologist

## 2023-08-14 NOTE — PLAN OF CARE
[facility]  Fiberoptic Endoscopic Evaluation of Swallowing (FEES)    Date: 8/14/2023     Name: Tasha Hawley   MRN: [id number]    Therapy Diagnosis:   Encounter Diagnosis   Name Primary?    Dysphagia, unspecified type    Physician: Prince Vance MD  Physician Orders: Ambulatory Referral for Speech Therapy, Dysphagia, FEES  Order Date: 7/31/23   Medical Diagnosis from Referral: Dysphagia, unspecified type [R13.10]    Visit #/Visits authorized: 1/ 1  Date of Evaluation:  8/14/2023   Insurance Authorization Period: 7/31/23 to 7/30/24   Plan of Care Expiration:  8/14/23 to 10/13/23    Time In: 1400  Time Out: 1450    Procedure   Flexible fiberoptic endoscopic evaluation of  swallowing by cine or video recording (CPT 62625)   Swallow and oral function evaluation (CPT 48904)     Precautions:Standard and Fall  Assessment   Tasha is a 67 y.o. female referred to outpatient Speech Therapy with a medical diagnosis of dysphagia. Results of this FEES revealted that the pt presents with mild-moderate swallow peristalsis and airway protection  as defined by the dysphagia outcome and severity scale (adapted for FEES by JASON Herrera,  Swallowing Services, [facility] from O'Uday et al 1999).     Oral phase findings Posterior containment Impaired    Mastication Within normal limits    Clearance Within normal limits   Pharyngeal phase findings Initiation of the swallow  delayed    Base of tongue retraction Mildly impaired    Epiglottic movement Partial Movement    Pharyngeal contraction Within normal limits    Laryngeal vestibule closure Impaired     PES opening Within normal limits    Other findings Multiple spontaneous swallows per bolus      Mild-Moderate oropharyngeal dysphagia. Both swallow safety and efficiency are impaired. . Patient appears to be at moderate risk for aspiration related pneumonia from a primary oropharyngeal dysphagia in consideration of three pillars of aspiration  pneumonia (Sharon, 2005) including oral health status, overall health/immune status, and laryngeal vestibule closure/severity of dysphagia. However, unable to assess risk related to aspiration pneumonia cause by the aspiration of gastric content. Patient at low risk for malnutrition/dehydration. Patient appears to be a fair candidate for behavioral swallow rehabilitation.     Consistency Recommendations: thin liquids (IDDSI 0) and soft and bite sized consistencies (IDDSI 6).     Functional Oral Intake Scale (FOIS)  The Functional Oral Intake Scale (FOIS) is an ordinal scale that is used to assess the current status and meaningful change in the oral intake. FOIS levels include:    TUBE DEPENDENT (levels 1-3) 1. No oral intake  2. Tube dependent with minimal/inconsistent oral intake  3. Tube supplements with consistent oral intake      TOTAL ORAL INTAKE (levels 4-7) 4. Total oral intake of a single consistency  5. Total oral intake of multiple consistencies requiring special preparation  6. Total oral intake with no special preparation, but must avoid specific foods or liquid items  7. Total oral intake with no restrictions     Patient is currently judged to be at FOIS level 5.      Demonstrates impairments including limitations as described in the problem list. Positive prognostic factors include patient motivation. Negative prognostic factors include complex medical history.Barriers to therapy include multiple medical appointments. Patient will benefit from skilled, outpatient dysphagia therapy.    Rehab Potential: fair  Pt's spiritual, cultural and educational needs considered and pt agreeable to plan of care and goals.    Short Term Goals (4 weeks):   Patient will complete effortful swallow 60-90x per session to improve tongue base retraction.   Patient will complete mendelsohn maneuver 30-45x per session to improve laryngeal vestibule closure.   Patient will complete Chin Tuck Against Resistance (CTAR) isometric  hold 3x 1 minute.   Patient will complete Chin Tuck Against Resistance (CTAR) repetition 30-90x per session     Long Term Goals (8 weeks):    She  will maintain adequate hydration/nutrition with optimum safety and efficiency of swallowing function on PO intake without overt signs and symptoms of aspiration for soft and bite size diet level.    She  will utilize compensatory strategies with optimum safety and efficiency of swallowing function on PO intake without overt signs and symptoms of aspiration for soft and bite size diet level.      Plan   Recommended Treatment Plan: Patient will participate in the [facility] program for speech therapy 1 times per week to address @HIS@  Swallow deficits, to educate patient and their family, and to participate in a home exercise program.     Other Recommendations:   - Aggressive oral care at least twice daily (morning and bedtime) is strongly recommended to reduce bacteria on oropharyngeal surfaces which may increase the risk of nosocomial infections, including pneumonia.   - Monitor for any signs/symptoms of aspiration (such as wet/gurgly voice that does not clear with coughing, inability to make any voice sounds, any persistent coughing with oral intake, otherwise unexplained fever, unexplained increased or new difficulty or discomfort breathing, unexplained increase in sleepiness/lethargy/significant fatigue, unexplained increase or new onset confusion or change in cognitive functioning, or any other unexplained change in health or well-being that could be related to swallowing).  - Risk Management: use good oral hygiene , sit upright for all PO intake, increase physical mobility as tolerated, feed only when wake and alert, small bites and sips, and multiple swallows per bolus  -Ancillary Tests: Consider N/a  -Follow-up exam: Follow up swallow study is not indicated at this time.    Therapist's Name:   ALEXANDRE Haas, CCC-SLP     Date: 8/14/2023      Subjective   Date of Onset: greater than 3 years ago  History of Current Condition:  Mrs. Tasha Hawley was referred by Dr. Vance for a FEES (CPT 50131) to objectively assess anatomy and physiology of the pharyngeal swallow, rule out silent aspiration, determine the safest possible diet, and examine the effectiveness of compensatory strategies. Patient's current nutritional avenue is oral. Patient is currently on a thin liquid diet consistency; regular food diet consistency. She presents with coughing, choking, difficulty swallowing solids, difficulty swallowing liquids, and food getting stuck during the swallow.     In consideration of the interrelationships between body systems and swallowing, History was provided by patient, family, and/or taken from chart review. The following are medical conditions present in the patient's history which can result in or be attributed to dysphagia:  Respiratory Recurrent pneumonia   Sleep apnea   Cardiovascular Atherosclerosis  Congestive heart failure  Coronary artery disease    Digestive Tortuous esophagus   Infections  None noted in this category   Urinary None noted in this category   Endocrine None noted in this category   Nervous None noted in this category   Skeletal None noted in this category   Immune Fibromyalgia   Cancer None noted in this category   Psychiatric  None noted in this category   Congenital  None noted in this category   Other None noted in this category  lymphedema     The following observations were made:   -Mental status: Lethargic and Cooperative  -Factors affecting performance: no difficulties participating in the study  -Feeding Method: needs some assistance  Respiratory Status: room air    Past Medical History: Tasha Hawley  has a past medical history of Anxiety, Arthritis, Bilateral lower extremity edema, Carotid artery occlusion, Cataract, CHF (congestive heart failure), Coronary artery disease, Depression, Fever blister, Hard of  hearing, Hypokalemia (01/09/2023), Hyponatremia (02/04/2022), Hypothyroid, Iron deficiency anemia, Lumbar radiculopathy, Ocular migraine, Other emphysema (05/22/2023), Renal disorder, and Sleep apnea.  Tasha Hawley  has a past surgical history that includes Hysterectomy (1975); Back surgery; pain pump placement; Spine surgery (5-13-13); Cataract extraction w/  intraocular lens implant (Left); Spinal cord stimulator removal; Esophagogastroduodenoscopy (N/A, 5/23/2018); Tonsillectomy; Adenoidectomy; Cardiac catheterization (2016); Cystoscopic litholapaxy (N/A, 6/27/2019); Cystoscopic litholapaxy (N/A, 9/3/2019); Replacement of catheter (N/A, 10/31/2019); Cystoscopy (N/A, 7/13/2021); Injection of botulinum toxin type A (7/13/2021); Cystoscopy (11/16/2021); Injection of botulinum toxin type A (11/16/2021); Cystoscopy (7/19/2022); Cystoscopy with injection of periurethral bulking agent (7/19/2022); Injection of botulinum toxin type A (7/19/2022); Removal of bone spur of foot (Bilateral, 9/16/2022); cystoscopy,with botulinum toxin injection (N/A, 12/13/2022); insertion, suprapubic catheter (N/A, 12/13/2022); cystoscopy,with botulinum toxin injection (N/A, 3/28/2023); Cystoscopy with injection of periurethral bulking agent (N/A, 3/28/2023); Esophagogastroduodenoscopy (N/A, 6/23/2023); and Replacement of baclofen pump (N/A, 8/2/2023).  Medical Hx and Allergies:  Tasha has a current medication list which includes the following prescription(s): aspirin, atorvastatin, butalbital-acetaminophen-caffeine -40 mg, cyanocobalamin (vitamin b-12), darifenacin, docusate sodium, ferrous gluconate, fludrocortisone, fluoxetine, fluticasone propionate, furosemide, hydrocodone-acetaminophen, hydrocortisone, hydroxyzine, intrathecal pain pump compound, ketorolac, ketorolac, levothyroxine, lidocaine, liothyronine, loperamide hcl, nitroglycerin, nystatin, ondansetron, pantoprazole, potassium chloride, promethazine, quetiapine,  senna, tiotropium bromide, tizanidine, and trazodone, and the following Facility-Administered Medications: alteplase, heparin, porcine (pf), and sodium chloride 0.9%.   Review of patient's allergies indicates:   Allergen Reactions    (d)-limonene flavor      Other reaction(s): difficult intubation  Other reaction(s): Difficulty breathing    Bactrim [sulfamethoxazole-trimethoprim] Anaphylaxis    Benadryl [diphenhydramine hcl] Shortness Of Breath    Fentanyl Itching, Nausea And Vomiting and Swelling             Imitrex [sumatriptan succinate] Shortness Of Breath    Percocet [oxycodone-acetaminophen] Shortness Of Breath    Topamax [topiramate] Shortness Of Breath    Vancomycin Anaphylaxis     Rash    Butorphanol tartrate     Darvocet a500 [propoxyphene n-acetaminophen]      Other reaction(s): Difficulty breathing    Evening primrose (oenothera biennis)     Lyrica [pregabalin] Other (See Comments)     tremors    White petrolatum-zinc     Zinc oxide-white petrolatum      Other reaction(s): Difficulty breathing    Latex, natural rubber Itching and Rash    Phenytoin Rash and Other (See Comments)     Trouble breathing     Pneumonia History: Yes   Previous MBSS:  No  Previous FEES:   No  Prior Therapy:  no OP therapy   Social History:  patient lives at home with her   Pain:   no pain behaviors observed   Patient's Therapy Goals:  initially, unsure why she was at evaluation, after explanation of the assessment, to find out why she was choking while eating  Objective     Procedure: A flexible fiberoptic nasoendoscope was passed transnasally to view the nasopharynx, oropharynx, hypopharynx, and larynx. 16 swallow trials using 1/2 teaspoon to  straw sips of real foods of thin liquid, nectar-thick liquid, puree, soft-mechanical, and solid consistencies were video recorded and analyzed using transnasal endoscopy. A clinical swallow evaluation (CPT 09935) was completed in conjunction with the FEES in order to evaluate the  "oral structures required for functional swallowing.   Scope Time: 5 minutes 51 seconds      Results revealed:   Cranial Nerve Examination  Cranial Nerve 5: Trigeminal Nerve  Motor Jaw Posture at rest: Closed  Mandible Elevation/Depression: WFL  Mandible lateralization: WFL  Abnormal movement: absent Interpretation: Intact bilaterally    Sensory Forehead: WFL  Cheek: WFL  Jaw: WFL  Facial Pain: None noted Interpretation: Intact bilaterally      Cranial Nerve 7: Facial Nerve  Motor Facial Symmetry: WNL  Wrinkle Forehead: WFL  Close eyes tightly: WFL  Labial Protrusion: WFL  Labial Retraction: WFL  Buccal Strength with Labial Seal: WFL  Abnormal movement: absent Interpretation: Intact bilaterally    Sensory Formal testing not completed.       Cranial Nerves IX and X: Glossopharyngeal and Vagus Nerves  Motor Palatal Symmetry (Rest): WNL  Palatal Symmetry (Movement): WNL  Cough: Perceptually strong  Voice Prior to PO intake: Clear  Resonance: Normal  Abnormal movement: absent Interpretation: Intact bilaterally      Cranial Nerve XII: Hypoglossal Nerve  Motor Tongue at rest: WNL  Lingual Protrusion: WNL  Lingual Protrusion against Resistance: WNL  Lingual Lateralization: WNL  Abnormal movement: absent Interpretation: Intact bilaterally      Other information:  Volitional Swallow: Able to palpate laryngeal rise  Mucosal Quality: No abnormal findings  Secretion Management: no overt deficits noted/observed  Dentition: Full dentures and Edentulous - dentures were broken and at home for evaluation today; patient frequently eats without dentures         Nasopharyngoscopic, pharyngoscopic, and laryngeal findings:  Nasopharyngoscopic Findings Velopharyngeal function WFL    Anatomic findings WNL   Pharyngoscopic & laryngoscopic findings Secretions mild secretions in pharyngeal walls and larynx without aspiration    Powell Secretion Scale 2: any secretions that changes from a "1" to a "3" during the observation period    Vocal fold " motion WFL- complete bilateral vocal fold abduction/adduction    Sensory integrity Appears functional- swallow initiated to penetration material, cough or throat clear to aspiration, and/or spontaneous swallow to significant residue    Anatomic findings Within normal limits       Consistency  Findings Rosenbeck's Penetration/Aspiration Scale (PAS) Strategies   Thin (IDDSI 0)    1/2 teaspoon x2  Full teaspoon x2  Self-regulated cup sip x2   Self-regulated straw sip x2  Vallecular residue: none present    Pyriform sinus residue: none present    Other residue: none present    Premature spillage to the pyriform sinus residue on the left side  Best: (1) Material does not enter the airway    Worst: (4) Material enters the airway, contacts the vocal folds, and is ejected from the airway    Aspiration occurs during the swallow with straw sip thin liquids over interarytenoid space and left arytenoid    None completed nor needed    Nectar thick (mildly thick/IDDSI 2)    1/2 teaspoon x2  Full teaspoon x1 Vallecular residue: none present    Pyriform sinus residue: none present    Other residue: none present Best: (1) Material does not enter the airway    Worst: (2) Material enters the airway, remains above the vocal folds, and is ejected from the airway  Penetration occurred during the swallow over interarytenoid space   Reflexive swallows     Puree (extremely thick/IDDSI 4)    1/2 teaspoon x1  Full teaspoon x2 Vallecular residue:  mild residue bilateral    Pyriform sinus residue: none present    Other residue: none present Best: (1) Material does not enter the airway    Worst: (3) Material enters the airway, remains above the vocal folds, and is not ejected from the airway  Penetration occurred during the swallow over left arytenoid - ejected with second swallow   Reflexive swallow   Mixed consistency (thin/ IDDSI 0 + soft and bite sized/ IDDSI 6)    - 1 peach in juice x1  - 2 peaches in juice x2 Vallecular residue: none  present    Pyriform sinus residue: none present    Other residue: none present\\    Delayed trigger - triggered at the level of the pyriform sinus Best: (1) Material does not enter the airway    Worst: (1) Material does not enter the airway     None completed nor needed      EAT-10 Score:    Eating Assessment Tool (EAT-10) is a questionnaire given to the patient to  her swallowing function. This assessment is used to document the initial dysphagia severity and monitor the treatment response in persons with a wide array of swallowing disorders.    Key: 0= no problem; 4= severe problem  1. My swallowing problem has caused me to lose weight. - 0  2. My swallowing problem interferes with my ability to go out for meals. - 4  3. Swallowing liquids takes extra effort. - 4  4. Swallowing solids takes extra effort. - 4  5. Swallowing pills takes extra effort. - 4  6. Swallowing is painful. - 0  7. The pleasure of eating is affected by my swallowing. - 4  8. When I swallow food sticks in my throat. - 3  9. I cough when I eat. - 0  10. Swallowing is stressful. - 3  Tasha ranked her swallowing function as a 26/40     Interpretation: Score of greater than 3 is indicative of a swallowing disorder (Jose et al., 2008); higher scores correlate with increased penetration-aspiration  scale scores, and scores greater than 15 results in the patient being 2.2 times more likely to aspirate (Lina et al., 2015)    Recommend MBSS: No    Education: Plan of Care, role of SLP in care, and oral hygiene  were discussed with the patient. Patient and family members expressed understanding of all discussed.

## 2023-08-15 PROBLEM — R13.12 OROPHARYNGEAL DYSPHAGIA: Status: ACTIVE | Noted: 2017-07-21

## 2023-08-15 NOTE — PROGRESS NOTES
Images:       Penetration over epiglottis + trace vallecular residue with puree Aspiration straw sip thin liquid during the swallow Final view         References:   ALPHONSE Garcia., SANTHOSH Dong., WILLIAMS Sutherland., LULI Madrigal., EDWARD Turner., LULI Gutierrez, & DAVID Can. (2008). Validity and reliability of the Eating Assessment Tool (EAT-10). Annals of Otology, Rhinology & Laryngology, 117(12), 919-924. [email or web address]://doi.[email or web address]/10.1177/[email or web address]  SANTHOSH Mills., NIEVES Pratt., Mary Ann, LULI K., Azalia, M. A., & ALPHONSE Garcia. (2015). The ability of the 10-item eating assessment tool (EAT-10) to predict aspiration risk in persons with dysphagia. Annals of Otology, Rhinology & Laryngology, 124(5), 351-354.

## 2023-08-16 ENCOUNTER — CLINICAL SUPPORT (OUTPATIENT)
Dept: NEUROSURGERY | Facility: CLINIC | Age: 68
End: 2023-08-16
Payer: MEDICARE

## 2023-08-16 DIAGNOSIS — Z98.890 S/P INSERTION OF INTRATHECAL PUMP: Primary | ICD-10-CM

## 2023-08-16 NOTE — PROGRESS NOTES
Tasha Hawley is a 67 y.o. female here for 2 week post op wound check.     Surgery: Pt is POD 14   from Replacement, Baclofen Pump  on 8/2/2023  by Dr. Condon.    Visit Type: In-Person    Risk Factors for Healing:  None    SUBJECTIVE     Incision Symptoms: none    PAIN MANAGEMENT    Pain Rating Today: 10. Pt states pain is chronic back pain.     Current Pain Management Regime:    Chronic pain management per pain management team.     INCISION(S)    Location Anterior: R, Lateral, Abdomen    Incision Status:  Clean, Dry, SAMANTHA. Edges well-approximated. No drainage or swelling noted.     Picture:       Note: Pt presents with 4+ edema to bilat upper and lower legs. Blistering noted on lower legs related to edema. Pt states she saw cardiology a few days ago and they are working her up for it. Discussed with ALEXY Tillman to ensure no further evaluation was required related to the baclofen pump.    INTERVENTION  Incision: Dissolvable Sutures in Place    Medication Refills Requested: None     New DME Requested: none.     EDUCATION  Reviewed Post Op instructions with patient/caregiver.   Discontinue Bacitratin  Keep incision SAMANTHA, no lotions, creams or bandages.   Ok to shower without direct water pressure to incision. Pat incision dry after shower.   No lifting >10 lbs.  No twisting or bending surgical area greater than 45 degrees from neutral position.  No driving or operating machinery while taking narcotic pain medication or muscle relaxers and cleared by surgeon.     Written copy of Post-op instructions provided to patient/caregiver. All questions answered. Patient/caregiver encouraged to call clinic with any future concerns. Patient/caregiver verbalizes understanding.     FOLLOW UP  Future Appointments   Date Time Provider Department Center   8/18/2023 10:00 AM Kaitlynn Hoyt MD NOM [facility]   8/21/2023 10:00 AM [facility] NM2 INJ [facility] NUCLEAR [facility]   8/21/2023 10:30 AM [facility] NM2 INJ [facility] [place]  [facility]   8/21/2023 11:00 AM ECHO/HOLTER/KENDALL, [facility]   8/21/2023  2:15 PM STRESS, [facility]   8/22/2023  1:30 PM CHAIR 01 [facility] INFUSIO [facility]   8/29/2023  8:00 AM Haja Holman MD [facility] URO [place]   8/29/2023 11:20 AM Nick Lozano MD [facility] CARDIO [facility]   9/8/2023 10:00 AM Kathryn Pittman MD [facility] ALLERG [place]   9/11/2023  8:40 AM [facility]HA LAB [place]   9/12/2023  2:30 PM Amanda Head, NP [facility] HEM ONC [facility]

## 2023-08-21 ENCOUNTER — HOSPITAL ENCOUNTER (OUTPATIENT)
Dept: CARDIOLOGY | Facility: HOSPITAL | Age: 68
Discharge: HOME | End: 2023-08-21
Attending: INTERNAL MEDICINE
Payer: MEDICARE

## 2023-08-21 ENCOUNTER — HOSPITAL ENCOUNTER (OUTPATIENT)
Dept: RADIOLOGY | Facility: HOSPITAL | Age: 68
Discharge: HOME | End: 2023-08-21
Attending: INTERNAL MEDICINE
Payer: MEDICARE

## 2023-08-21 VITALS — HEIGHT: 64 IN | BODY MASS INDEX: 36.54 KG/M2 | WEIGHT: 214 LBS

## 2023-08-21 DIAGNOSIS — R07.9 CHEST PAIN, UNSPECIFIED TYPE: ICD-10-CM

## 2023-08-21 DIAGNOSIS — I25.119 CORONARY ARTERY DISEASE INVOLVING NATIVE CORONARY ARTERY OF NATIVE HEART WITH ANGINA PECTORIS: ICD-10-CM

## 2023-08-21 DIAGNOSIS — R13.10 DYSPHAGIA, UNSPECIFIED TYPE: ICD-10-CM

## 2023-08-21 DIAGNOSIS — J39.2 PHARYNGEAL DISORDER: Primary | ICD-10-CM

## 2023-08-21 LAB
AV INDEX (PROSTH): 0.6
AV MEAN GRADIENT: 8 MMHG
AV PEAK GRADIENT: 14 MMHG
AV VALVE AREA BY VELOCITY RATIO: 1.96 CM²
AV VALVE AREA: 2.05 CM²
AV VELOCITY RATIO: 0.58
BSA FOR ECHO PROCEDURE: 2.09 M2
CV ECHO LV RWT: 0.33 CM
CV STRESS BASE HR: 72 BPM
DIASTOLIC BLOOD PRESSURE: 73 MMHG
DOP CALC AO PEAK VEL: 1.85 M/S
DOP CALC AO VTI: 45.5 CM
DOP CALC LVOT AREA: 3.4 CM2
DOP CALC LVOT DIAMETER: 2.08 CM
DOP CALC LVOT PEAK VEL: 1.07 M/S
DOP CALC LVOT STROKE VOLUME: 93.4 CM3
DOP CALC MV VTI: 35.7 CM
DOP CALCLVOT PEAK VEL VTI: 27.5 CM
E WAVE DECELERATION TIME: 207.2 MSEC
E/A RATIO: 1.03
E/E' RATIO: 9 M/S
ECHO LV POSTERIOR WALL: 0.81 CM (ref 0.6–1.1)
EJECTION FRACTION: 65 %
FRACTIONAL SHORTENING: 38 % (ref 28–44)
INTERVENTRICULAR SEPTUM: 0.69 CM (ref 0.6–1.1)
IVC DIAMETER: 2.18 CM
LA MAJOR: 5.42 CM
LA MINOR: 5.62 CM
LA WIDTH: 4.2 CM
LEFT ATRIUM SIZE: 4.13 CM
LEFT ATRIUM VOLUME INDEX MOD: 34.7 ML/M2
LEFT ATRIUM VOLUME INDEX: 40.5 ML/M2
LEFT ATRIUM VOLUME MOD: 69.79 CM3
LEFT ATRIUM VOLUME: 81.36 CM3
LEFT INTERNAL DIMENSION IN SYSTOLE: 3.01 CM (ref 2.1–4)
LEFT VENTRICLE DIASTOLIC VOLUME INDEX: 55.41 ML/M2
LEFT VENTRICLE DIASTOLIC VOLUME: 111.38 ML
LEFT VENTRICLE MASS INDEX: 59 G/M2
LEFT VENTRICLE SYSTOLIC VOLUME INDEX: 17.5 ML/M2
LEFT VENTRICLE SYSTOLIC VOLUME: 35.23 ML
LEFT VENTRICULAR INTERNAL DIMENSION IN DIASTOLE: 4.87 CM (ref 3.5–6)
LEFT VENTRICULAR MASS: 119.55 G
LV LATERAL E/E' RATIO: 9 M/S
LV SEPTAL E/E' RATIO: 9 M/S
LVOT MG: 2.5 MMHG
LVOT MV: 0.74 CM/S
MV A" WAVE DURATION": 119.89 MSEC
MV MEAN GRADIENT: 3 MMHG
MV PEAK A VEL: 1.14 M/S
MV PEAK E VEL: 1.17 M/S
MV PEAK GRADIENT: 5 MMHG
MV STENOSIS PRESSURE HALF TIME: 60.09 MS
MV VALVE AREA BY CONTINUITY EQUATION: 2.62 CM2
MV VALVE AREA P 1/2 METHOD: 3.66 CM2
OHS CV CPX 85 PERCENT MAX PREDICTED HEART RATE MALE: 126
OHS CV CPX MAX PREDICTED HEART RATE: 148
OHS CV CPX PATIENT IS FEMALE: 1
OHS CV CPX PATIENT IS MALE: 0
OHS CV CPX PEAK DIASTOLIC BLOOD PRESSURE: 73 MMHG
OHS CV CPX PEAK HEAR RATE: 87 BPM
OHS CV CPX PEAK RATE PRESSURE PRODUCT: 8352
OHS CV CPX PEAK SYSTOLIC BLOOD PRESSURE: 96 MMHG
OHS CV CPX PERCENT MAX PREDICTED HEART RATE ACHIEVED: 59
OHS CV CPX RATE PRESSURE PRODUCT PRESENTING: 6912
OHS LV EJECTION FRACTION SIMPSONS BIPLANE MOD: 66 %
PISA TR MAX VEL: 3.01 M/S
PULM VEIN S/D RATIO: 1.19
PV MV: 0.94 M/S
PV PEAK D VEL: 0.54 M/S
PV PEAK GRADIENT: 7 MMHG
PV PEAK S VEL: 0.64 M/S
PV PEAK VELOCITY: 1.33 M/S
RA MAJOR: 4.88 CM
RA PRESSURE ESTIMATED: 15 MMHG
RA WIDTH: 3.7 CM
RIGHT VENTRICULAR END-DIASTOLIC DIMENSION: 3.1 CM
RV TB RVSP: 18 MMHG
RV TISSUE DOPPLER FREE WALL SYSTOLIC VELOCITY 1 (APICAL 4 CHAMBER VIEW): 15.39 CM/S
SYSTOLIC BLOOD PRESSURE: 96 MMHG
TDI LATERAL: 0.13 M/S
TDI SEPTAL: 0.13 M/S
TDI: 0.13 M/S
TR MAX PG: 36 MMHG
TRICUSPID ANNULAR PLANE SYSTOLIC EXCURSION: 2.24 CM
TV REST PULMONARY ARTERY PRESSURE: 51 MMHG
Z-SCORE OF LEFT VENTRICULAR DIMENSION IN END DIASTOLE: -1.9
Z-SCORE OF LEFT VENTRICULAR DIMENSION IN END SYSTOLE: -1.45

## 2023-08-21 PROCEDURE — 93016 CV STRESS TEST SUPVJ ONLY: CPT | Mod: ,,, | Performed by: INTERNAL MEDICINE

## 2023-08-21 PROCEDURE — 93306 ECHO (CUPID ONLY): ICD-10-PCS | Mod: 26,,, | Performed by: INTERNAL MEDICINE

## 2023-08-21 PROCEDURE — A9502 TC99M TETROFOSMIN: HCPCS

## 2023-08-21 PROCEDURE — 78452 HT MUSCLE IMAGE SPECT MULT: CPT | Mod: TC

## 2023-08-21 PROCEDURE — 93018 CV STRESS TEST I&R ONLY: CPT | Mod: ,,, | Performed by: INTERNAL MEDICINE

## 2023-08-21 PROCEDURE — 93016 NUCLEAR STRESS TEST (CUPID ONLY): ICD-10-PCS | Mod: ,,, | Performed by: INTERNAL MEDICINE

## 2023-08-21 PROCEDURE — 93018 NUCLEAR STRESS TEST (CUPID ONLY): ICD-10-PCS | Mod: ,,, | Performed by: INTERNAL MEDICINE

## 2023-08-21 PROCEDURE — 78452 NM MYOCARDIAL PERFUSION SPECT MULTI PHARM: ICD-10-PCS | Mod: 26,,, | Performed by: STUDENT IN AN ORGANIZED HEALTH CARE EDUCATION/TRAINING PROGRAM

## 2023-08-21 PROCEDURE — 93306 TTE W/DOPPLER COMPLETE: CPT

## 2023-08-21 PROCEDURE — 78452 HT MUSCLE IMAGE SPECT MULT: CPT | Mod: 26,,, | Performed by: STUDENT IN AN ORGANIZED HEALTH CARE EDUCATION/TRAINING PROGRAM

## 2023-08-21 PROCEDURE — 93017 CV STRESS TEST TRACING ONLY: CPT

## 2023-08-21 PROCEDURE — 93306 TTE W/DOPPLER COMPLETE: CPT | Mod: 26,,, | Performed by: INTERNAL MEDICINE

## 2023-08-21 RX ORDER — REGADENOSON 0.08 MG/ML
0.4 INJECTION, SOLUTION INTRAVENOUS ONCE
Status: DISCONTINUED | OUTPATIENT
Start: 2023-08-21 | End: 2023-08-29 | Stop reason: HOSPADM

## 2023-08-22 ENCOUNTER — OUTPATIENT CASE MANAGEMENT (OUTPATIENT)
Dept: ADMINISTRATIVE | Facility: OTHER | Age: 68
End: 2023-08-22
Payer: MEDICARE

## 2023-08-22 NOTE — LETTER
August 24, 2023         Tasha Hawley  [address] [place] [address]          Dear Mrs Cordova,    Here is the information on heart failure that you requested me to resend to you.  Please read through it, it has a lot of good information in it for you.  Please call me if you need anything or have any questions.      Thank you,    Kenya Box RN  RN Case Manager  Outpatient Care Management  [facility]  [phone]  [email or web address]

## 2023-08-23 ENCOUNTER — TELEPHONE (OUTPATIENT)
Dept: RHEUMATOLOGY | Facility: CLINIC | Age: 68
End: 2023-08-23
Payer: MEDICARE

## 2023-08-23 ENCOUNTER — TELEPHONE (OUTPATIENT)
Dept: ENDOSCOPY | Facility: HOSPITAL | Age: 68
End: 2023-08-23
Payer: MEDICARE

## 2023-08-23 DIAGNOSIS — G89.4 CHRONIC PAIN SYNDROME: Chronic | ICD-10-CM

## 2023-08-23 DIAGNOSIS — I89.0 LYMPHEDEMA: Primary | ICD-10-CM

## 2023-08-23 NOTE — TELEPHONE ENCOUNTER
----- Message from Saba Landrum sent at 8/23/2023 12:08 PM CDT -----  Contact: [phone] @ patient  Good afternoon patient would like a call back about getting her some test done. Please give patient a call back [phone]

## 2023-08-23 NOTE — PROGRESS NOTES
Outpatient Care Management  Plan of Care Follow Up Visit    Patient: Tasha Hawley  MRN: [id number]  Date of Service: 08/22/2023  Completed by: Michelle Box RN  Referral Date: 04/17/2023    Reason for Visit   Patient presents with    OPCM RN Follow Up Call       Brief Summary: Patient states she has not been weighing herself because it depresses her.  She states she has never been this big in her whole life.  Her last weight was 214 at the doctor's office 8/22.  Today she states she is having a problem with her O2 sats.  She states it was down to 82% but with her home O2 it is back up to 90%.  Both her legs are swollen from lymphedema, they are full of blisters and weeping fluid.  Her legs are wrapped and the home health nurse changes the wraps for her.  Patient states she is always tired and weak.  She is very depressed because of this also.  Patient states she received the Mom's meals but didn't care for them too much.  She states she has been eating things like [place] Salami and turkey meat on sandwiches.  She states gets up to walk around a bit.  She states she did not know that she is at increased risk of developing a blood clot because of her heart failure.  She states she does not know the signs and symptoms of blood clots.    CM reiterated to the patient the importance of daily weights and logging them.  CM reminded her that she needs to know if she is gaining weight rapidly and to notify her doctor if she gains 3 lbs in one day or 5 lbs in one week.  Patient has a referral to wound care clinic, CM called and rescheduled a cancelled appointment for wound care with Dr. Montalvo for 9/6 at 1:00 PM in the [facility].  Patient currently has an appointment schedule with Dr. Madera but not until 10/27.  CM called to get an earlier appointment for patient but psychiatry states it has to be the patient to call in.  CM gave psychiatry phone number to patient to make the call.   CM resent educational materials  on CHF at patient's request.  CM reviewed a 2498-3970 mg sodium diet with patient and the fact that processed meats like salami and turkey have a lot of sodium in them.  CM encouraged patient to look for low sodium turkey and to not eat salami.  CM also reviewed the signs and symptoms of VTE including pain or tenderness in an extremity not caused by injury, swelling of leg or arm and skin of extremity that is warm to touch, red or discolored (pale, blue).  CM reviewed the signs and symptoms of PE including difficulty breathing, sudden respiratory distress; chest pain that worsens with a deep breath; coughing or coughing up blood; faster than normal or irregular heartbeat or restlessness fear, sense of impending doom.  CM encouraged patient to get up every hour when sitting to walk around and exercise her legs and arms to help prevent blood clots.    Next steps:   Follow up phone call in three weeks  Follow up if weighing herself daily and logging  Follow up on 3 lb/5 lb rule  Follow up if got earlier psychiatry appointment with Dr. Madera  Follow up if received educational materials resent  Follow up if eating processed meats  Follow up on low sodium diet  Follow up on S&S of CHF  Follow up if received Mom's Meals  Educate on VTE

## 2023-08-23 NOTE — TELEPHONE ENCOUNTER
Returned patients call. She states that she is always in pain especially in her lower extremities. She continues to follow with pain management. Discussed that she would benefit from Wound Care. She has a referral to wound care placed by PCP. The patient arrived late to her last appointment with us. Will need to arrange follow-up.     Kaitlynn Hoyt MD  Rheumatology Fellow, PGY4

## 2023-08-25 ENCOUNTER — TELEPHONE (OUTPATIENT)
Dept: UROLOGY | Facility: CLINIC | Age: 68
End: 2023-08-25
Payer: MEDICARE

## 2023-08-25 NOTE — TELEPHONE ENCOUNTER
----- Message from Ramiro Ford sent at 8/23/2023  3:51 PM CDT -----  Regarding: Botox questions  Contact: @ [phone]  Pt is calling to speak with someone in the office to see if she can get Botox for her urinating on herself. Pt states that she is in a lot of pain and its very raw from the urination. Pt wants to speak with someone today. Please call pt to discuss further

## 2023-08-29 ENCOUNTER — LAB VISIT (OUTPATIENT)
Dept: LAB | Facility: HOSPITAL | Age: 68
End: 2023-08-29
Attending: INTERNAL MEDICINE
Payer: MEDICARE

## 2023-08-29 ENCOUNTER — OFFICE VISIT (OUTPATIENT)
Dept: UROLOGY | Facility: CLINIC | Age: 68
End: 2023-08-29
Payer: MEDICARE

## 2023-08-29 ENCOUNTER — OFFICE VISIT (OUTPATIENT)
Dept: CARDIOLOGY | Facility: CLINIC | Age: 68
End: 2023-08-29
Payer: MEDICARE

## 2023-08-29 ENCOUNTER — TELEPHONE (OUTPATIENT)
Dept: PULMONOLOGY | Facility: CLINIC | Age: 68
End: 2023-08-29
Payer: MEDICARE

## 2023-08-29 VITALS
HEIGHT: 64 IN | BODY MASS INDEX: 38.47 KG/M2 | DIASTOLIC BLOOD PRESSURE: 71 MMHG | HEART RATE: 60 BPM | SYSTOLIC BLOOD PRESSURE: 136 MMHG | WEIGHT: 225.31 LBS

## 2023-08-29 VITALS
DIASTOLIC BLOOD PRESSURE: 73 MMHG | OXYGEN SATURATION: 92 % | WEIGHT: 220 LBS | BODY MASS INDEX: 36.65 KG/M2 | SYSTOLIC BLOOD PRESSURE: 137 MMHG | HEIGHT: 65 IN | HEART RATE: 67 BPM

## 2023-08-29 DIAGNOSIS — K21.00 GASTROESOPHAGEAL REFLUX DISEASE WITH ESOPHAGITIS WITHOUT HEMORRHAGE: ICD-10-CM

## 2023-08-29 DIAGNOSIS — Z93.59 SUPRAPUBIC CATHETER: ICD-10-CM

## 2023-08-29 DIAGNOSIS — I77.9 BILATERAL CAROTID ARTERY DISEASE, UNSPECIFIED TYPE: ICD-10-CM

## 2023-08-29 DIAGNOSIS — G47.30 SLEEP APNEA, UNSPECIFIED TYPE: ICD-10-CM

## 2023-08-29 DIAGNOSIS — N18.31 STAGE 3A CHRONIC KIDNEY DISEASE: ICD-10-CM

## 2023-08-29 DIAGNOSIS — R06.02 SOB (SHORTNESS OF BREATH): ICD-10-CM

## 2023-08-29 DIAGNOSIS — I25.119 CORONARY ARTERY DISEASE INVOLVING NATIVE CORONARY ARTERY OF NATIVE HEART WITH ANGINA PECTORIS: ICD-10-CM

## 2023-08-29 DIAGNOSIS — N39.0 RECURRENT UTI: ICD-10-CM

## 2023-08-29 DIAGNOSIS — I89.0 LYMPHEDEMA: ICD-10-CM

## 2023-08-29 DIAGNOSIS — E03.9 HYPOTHYROIDISM (ACQUIRED): ICD-10-CM

## 2023-08-29 DIAGNOSIS — I70.0 THORACIC AORTA ATHEROSCLEROSIS: ICD-10-CM

## 2023-08-29 DIAGNOSIS — I27.29 PULMONARY HYPERTENSION DUE TO DIASTOLIC SYSTEMIC VENTRICULAR DYSFUNCTION: ICD-10-CM

## 2023-08-29 DIAGNOSIS — E78.00 PURE HYPERCHOLESTEROLEMIA: ICD-10-CM

## 2023-08-29 DIAGNOSIS — I50.22 CHRONIC SYSTOLIC HEART FAILURE: ICD-10-CM

## 2023-08-29 DIAGNOSIS — I51.9 PULMONARY HYPERTENSION DUE TO DIASTOLIC SYSTEMIC VENTRICULAR DYSFUNCTION: ICD-10-CM

## 2023-08-29 DIAGNOSIS — R06.02 SOB (SHORTNESS OF BREATH): Primary | ICD-10-CM

## 2023-08-29 DIAGNOSIS — Z93.59 SUPRAPUBIC CATHETER: Chronic | ICD-10-CM

## 2023-08-29 LAB
ANION GAP SERPL CALC-SCNC: 12 MMOL/L (ref 8–16)
BUN SERPL-MCNC: 9 MG/DL (ref 8–23)
CALCIUM SERPL-MCNC: 9.4 MG/DL (ref 8.7–10.5)
CHLORIDE SERPL-SCNC: 100 MMOL/L (ref 95–110)
CO2 SERPL-SCNC: 29 MMOL/L (ref 23–29)
CREAT SERPL-MCNC: 0.9 MG/DL (ref 0.5–1.4)
EST. GFR  (NO RACE VARIABLE): >60 ML/MIN/1.73 M^2
GLUCOSE SERPL-MCNC: 116 MG/DL (ref 70–110)
POTASSIUM SERPL-SCNC: 3.7 MMOL/L (ref 3.5–5.1)
SODIUM SERPL-SCNC: 141 MMOL/L (ref 136–145)

## 2023-08-29 PROCEDURE — 99214 PR OFFICE/OUTPT VISIT, EST, LEVL IV, 30-39 MIN: ICD-10-PCS | Mod: S$GLB,,, | Performed by: UROLOGY

## 2023-08-29 PROCEDURE — 3044F PR MOST RECENT HEMOGLOBIN A1C LEVEL <7.0%: ICD-10-PCS | Mod: CPTII,S$GLB,, | Performed by: UROLOGY

## 2023-08-29 PROCEDURE — 1126F PR PAIN SEVERITY QUANTIFIED, NO PAIN PRESENT: ICD-10-PCS | Mod: CPTII,S$GLB,, | Performed by: INTERNAL MEDICINE

## 2023-08-29 PROCEDURE — 3008F PR BODY MASS INDEX (BMI) DOCUMENTED: ICD-10-PCS | Mod: CPTII,S$GLB,, | Performed by: UROLOGY

## 2023-08-29 PROCEDURE — 3288F FALL RISK ASSESSMENT DOCD: CPT | Mod: CPTII,S$GLB,, | Performed by: UROLOGY

## 2023-08-29 PROCEDURE — 1100F PTFALLS ASSESS-DOCD GE2>/YR: CPT | Mod: CPTII,S$GLB,, | Performed by: UROLOGY

## 2023-08-29 PROCEDURE — 1125F AMNT PAIN NOTED PAIN PRSNT: CPT | Mod: CPTII,S$GLB,, | Performed by: UROLOGY

## 2023-08-29 PROCEDURE — 3008F PR BODY MASS INDEX (BMI) DOCUMENTED: ICD-10-PCS | Mod: CPTII,S$GLB,, | Performed by: INTERNAL MEDICINE

## 2023-08-29 PROCEDURE — 1125F PR PAIN SEVERITY QUANTIFIED, PAIN PRESENT: ICD-10-PCS | Mod: CPTII,S$GLB,, | Performed by: UROLOGY

## 2023-08-29 PROCEDURE — 3044F HG A1C LEVEL LT 7.0%: CPT | Mod: CPTII,S$GLB,, | Performed by: UROLOGY

## 2023-08-29 PROCEDURE — 3288F PR FALLS RISK ASSESSMENT DOCUMENTED: ICD-10-PCS | Mod: CPTII,S$GLB,, | Performed by: UROLOGY

## 2023-08-29 PROCEDURE — 3078F PR MOST RECENT DIASTOLIC BLOOD PRESSURE < 80 MM HG: ICD-10-PCS | Mod: CPTII,S$GLB,, | Performed by: UROLOGY

## 2023-08-29 PROCEDURE — 80048 BASIC METABOLIC PNL TOTAL CA: CPT | Performed by: INTERNAL MEDICINE

## 2023-08-29 PROCEDURE — 99999 PR PBB SHADOW E&M-EST. PATIENT-LVL V: CPT | Mod: PBBFAC,,, | Performed by: UROLOGY

## 2023-08-29 PROCEDURE — 3078F PR MOST RECENT DIASTOLIC BLOOD PRESSURE < 80 MM HG: ICD-10-PCS | Mod: CPTII,S$GLB,, | Performed by: INTERNAL MEDICINE

## 2023-08-29 PROCEDURE — 36415 COLL VENOUS BLD VENIPUNCTURE: CPT | Performed by: INTERNAL MEDICINE

## 2023-08-29 PROCEDURE — 99999 PR PBB SHADOW E&M-EST. PATIENT-LVL V: ICD-10-PCS | Mod: PBBFAC,,, | Performed by: UROLOGY

## 2023-08-29 PROCEDURE — 1160F RVW MEDS BY RX/DR IN RCRD: CPT | Mod: CPTII,S$GLB,, | Performed by: INTERNAL MEDICINE

## 2023-08-29 PROCEDURE — 3008F BODY MASS INDEX DOCD: CPT | Mod: CPTII,S$GLB,, | Performed by: UROLOGY

## 2023-08-29 PROCEDURE — 3075F SYST BP GE 130 - 139MM HG: CPT | Mod: CPTII,S$GLB,, | Performed by: INTERNAL MEDICINE

## 2023-08-29 PROCEDURE — 1159F MED LIST DOCD IN RCRD: CPT | Mod: CPTII,S$GLB,, | Performed by: INTERNAL MEDICINE

## 2023-08-29 PROCEDURE — 87077 CULTURE AEROBIC IDENTIFY: CPT | Performed by: UROLOGY

## 2023-08-29 PROCEDURE — 99999 PR PBB SHADOW E&M-EST. PATIENT-LVL V: ICD-10-PCS | Mod: PBBFAC,,, | Performed by: INTERNAL MEDICINE

## 2023-08-29 PROCEDURE — 3075F PR MOST RECENT SYSTOLIC BLOOD PRESS GE 130-139MM HG: ICD-10-PCS | Mod: CPTII,S$GLB,, | Performed by: UROLOGY

## 2023-08-29 PROCEDURE — 1160F PR REVIEW ALL MEDS BY PRESCRIBER/CLIN PHARMACIST DOCUMENTED: ICD-10-PCS | Mod: CPTII,S$GLB,, | Performed by: INTERNAL MEDICINE

## 2023-08-29 PROCEDURE — 99214 OFFICE O/P EST MOD 30 MIN: CPT | Mod: S$GLB,,, | Performed by: INTERNAL MEDICINE

## 2023-08-29 PROCEDURE — 87186 SC STD MICRODIL/AGAR DIL: CPT | Performed by: UROLOGY

## 2023-08-29 PROCEDURE — 99214 PR OFFICE/OUTPT VISIT, EST, LEVL IV, 30-39 MIN: ICD-10-PCS | Mod: S$GLB,,, | Performed by: INTERNAL MEDICINE

## 2023-08-29 PROCEDURE — 3078F DIAST BP <80 MM HG: CPT | Mod: CPTII,S$GLB,, | Performed by: INTERNAL MEDICINE

## 2023-08-29 PROCEDURE — 3075F SYST BP GE 130 - 139MM HG: CPT | Mod: CPTII,S$GLB,, | Performed by: UROLOGY

## 2023-08-29 PROCEDURE — 99999 PR PBB SHADOW E&M-EST. PATIENT-LVL V: CPT | Mod: PBBFAC,,, | Performed by: INTERNAL MEDICINE

## 2023-08-29 PROCEDURE — 1100F PR PT FALLS ASSESS DOC 2+ FALLS/FALL W/INJURY/YR: ICD-10-PCS | Mod: CPTII,S$GLB,, | Performed by: UROLOGY

## 2023-08-29 PROCEDURE — 3075F PR MOST RECENT SYSTOLIC BLOOD PRESS GE 130-139MM HG: ICD-10-PCS | Mod: CPTII,S$GLB,, | Performed by: INTERNAL MEDICINE

## 2023-08-29 PROCEDURE — 3044F PR MOST RECENT HEMOGLOBIN A1C LEVEL <7.0%: ICD-10-PCS | Mod: CPTII,S$GLB,, | Performed by: INTERNAL MEDICINE

## 2023-08-29 PROCEDURE — 99214 OFFICE O/P EST MOD 30 MIN: CPT | Mod: S$GLB,,, | Performed by: UROLOGY

## 2023-08-29 PROCEDURE — 3008F BODY MASS INDEX DOCD: CPT | Mod: CPTII,S$GLB,, | Performed by: INTERNAL MEDICINE

## 2023-08-29 PROCEDURE — 1159F PR MEDICATION LIST DOCUMENTED IN MEDICAL RECORD: ICD-10-PCS | Mod: CPTII,S$GLB,, | Performed by: INTERNAL MEDICINE

## 2023-08-29 PROCEDURE — 1126F AMNT PAIN NOTED NONE PRSNT: CPT | Mod: CPTII,S$GLB,, | Performed by: INTERNAL MEDICINE

## 2023-08-29 PROCEDURE — 3044F HG A1C LEVEL LT 7.0%: CPT | Mod: CPTII,S$GLB,, | Performed by: INTERNAL MEDICINE

## 2023-08-29 PROCEDURE — 87086 URINE CULTURE/COLONY COUNT: CPT | Performed by: UROLOGY

## 2023-08-29 PROCEDURE — 3078F DIAST BP <80 MM HG: CPT | Mod: CPTII,S$GLB,, | Performed by: UROLOGY

## 2023-08-29 PROCEDURE — 87088 URINE BACTERIA CULTURE: CPT | Performed by: UROLOGY

## 2023-08-29 RX ORDER — TRAMADOL HYDROCHLORIDE 50 MG/1
TABLET ORAL
Status: ON HOLD | COMMUNITY
Start: 2023-08-28 | End: 2023-09-14 | Stop reason: HOSPADM

## 2023-08-29 NOTE — TELEPHONE ENCOUNTER
----- Message from Bubba Savage sent at 8/29/2023  1:26 PM CDT -----  Regarding: appt soon  Contact: PT @ [phone]  Pt called in to schedule an appt; no available appts in Epic. Pt is asking for a call back soon to schedule. Thanks.     Reason appt not schedule:  none available in Epic. Pt received recall letter in the mail; to schedule appt in October. Pt is asking to be seen before October.     Patient's DX: breathing; having difficulty.     Patient requesting call back or [facility] msg: Pt states that Dr. Chilel was to be looking into an portable oxygen tank. Pt is asking for a return call back.

## 2023-08-29 NOTE — TELEPHONE ENCOUNTER
LVM for patient to call me back in regards to her message about a portable oxygen tank. Office number was provided.

## 2023-08-29 NOTE — PROGRESS NOTES
[facility]      New Overactive Bladder (OAB) Note    8/29/2023    Referred by:  Mesfin Hodges II, MD    HPI: Tasha Hawley is a pleasant 68 y.o. . female who is presents with a history of neurogenic bladder with incomplete emptying managed with a suprapubic tube.  She was last injected with Botox by Dr. Mayo in December 2022.   She states she has return of incontinence despite SP drainage.     Her injection was postponed due to need for cardiology evaluation. She has significant pulmonary and cardiovascular comorbidities.     A review of 10+ systems was conducted with pertinent positive and negative findings documented in HPI with all other systems reviewed and negative.    Past medical, family, surgical and social history reviewed as documented in chart with pertinent positive medical, family, surgical and social history detailed in HPI.    Exam Findings:    Const: no acute distress, conversant and alert  Eyes: anicteric, extraocular muscles intact  ENMT: normocephalic, Nl oral membranes  Cardio: no cyanosis, nl cap refill; bilateral LE edema  Pulm: no tachypnea; no resp distress  Abd: soft, no tenderness  Musc: no laceration, no tenderness  Neuro: alert; oriented x 3  Skin: warm, dry; no petichiae  Psych: no anxiety; normal speech     Assessment/Plan:    Neurogenic Bladder  (new, addt'l workup): We can try to get her in for repeat Botox 300 units if cardiology clearance is obtained for sedation (which is how she has always been injected). Urine sent for culture today.

## 2023-08-29 NOTE — PROGRESS NOTES
Subjective:    Patient ID:  Tasha Hawley is a 68 y.o. female who presents for follow-up of Shortness of Breath      Seen by Dr Ceballos in the past. Per his note 8/2022:       She is here for clearance related to bone spurs and podiatry issues of both lower extremities.  Her feet are wrapped.  The surgery has not been scheduled yet.  She has no history of peripheral arterial disease.  She does not ambulate very much.  She is in a wheelchair.  She is with her  today.    She was hospitalized at [place] for diastolic heart failure in leg swelling.  She is a cigarette smoker.  She had an Electrocardiogram in July which looked normal.  Her history includes previous cardiac catheterization which she does not remember.  2016 catheterization documents subtotaled left anterior descending artery with right-to-left collaterals.  She had a nuclear stress test June 2021 showing normal ejection fraction and normal perfusion study.  She has low blood pressure.  She is on furosemide for her leg swelling.  Her echo July 2022 confirms normal ejection fraction without valvular disease of significance.  She is on atorvastatin aspirin.  She denies chest pain.    8/11/2023:  Has worsening bilateral LE edema and erythema. Has what looks like venous stasis ulcers. Has CP/JONES/orthopnea. Compliant with meds. Drinks a lot of water.     8/29/2023:  Since last visit had normal nuclear stress test, LE venous doppler without DVT or significant congestion (difficult study), and 2DE with normal EF and elevated PASP. Has lost a few pounds since last visit, however, has not been taking lasix as recommended. Bilateral LE edema improved, however, she remains very SOB. Has HH nurse that wraps legs. Continues to drink a lot of fluids.     Review of Systems   Constitutional: Negative for chills, decreased appetite, diaphoresis, fever, malaise/fatigue, night sweats, weight gain and weight loss.   HENT:  Positive for  hearing loss. Negative for congestion, ear discharge, ear pain, hoarse voice, nosebleeds, odynophagia, sore throat, stridor and tinnitus.    Eyes:  Negative for blurred vision, discharge, double vision, pain, photophobia, redness, vision loss in left eye, vision loss in right eye, visual disturbance and visual halos.   Cardiovascular:  Positive for leg swelling. Negative for chest pain, claudication, cyanosis, dyspnea on exertion, irregular heartbeat, near-syncope, orthopnea, palpitations, paroxysmal nocturnal dyspnea and syncope.   Respiratory:  Positive for cough and shortness of breath. Negative for hemoptysis, sleep disturbances due to breathing, snoring, sputum production and wheezing.    Endocrine: Negative for cold intolerance, heat intolerance, polydipsia, polyphagia and polyuria.   Hematologic/Lymphatic: Negative for adenopathy and bleeding problem. Does not bruise/bleed easily.   Skin:  Positive for poor wound healing. Negative for color change, dry skin, flushing, itching, nail changes, rash, skin cancer, suspicious lesions and unusual hair distribution.   Musculoskeletal:  Positive for joint pain. Negative for arthritis, back pain, falls, gout, joint swelling, muscle cramps, muscle weakness, myalgias, neck pain and stiffness.   Gastrointestinal:  Negative for bloating, abdominal pain, anorexia, change in bowel habit, bowel incontinence, constipation, diarrhea, dysphagia, excessive appetite, flatus, heartburn, hematemesis, hematochezia, hemorrhoids, jaundice, melena, nausea and vomiting.   Genitourinary:  Negative for bladder incontinence, decreased libido, dysuria, flank pain, frequency, genital sores, hematuria, hesitancy, incomplete emptying, nocturia and urgency.   Neurological:  Negative for aphonia, brief paralysis, difficulty with concentration, disturbances in coordination, excessive daytime sleepiness, dizziness, focal weakness, headaches, light-headedness, loss of balance, numbness,  "paresthesias, seizures, sensory change, tremors, vertigo and weakness.   Psychiatric/Behavioral:  Negative for altered mental status, depression, hallucinations, memory loss, substance abuse, suicidal ideas and thoughts of violence. The patient does not have insomnia and is not nervous/anxious.    Allergic/Immunologic: Negative for hives and persistent infections.        Objective:       Vitals:    08/29/23 1139   BP: 137/73   BP Location: Left arm   Patient Position: Sitting   Pulse: 67   SpO2: (!) 92%   Weight: 99.8 kg (220 lb)   Height: 5' 5" (1.651 m)    Physical Exam  Constitutional:       General: She is not in acute distress.     Appearance: She is well-developed. She is not diaphoretic.   HENT:      Head: Normocephalic and atraumatic.      Nose: Nose normal.   Eyes:      General: No scleral icterus.        Right eye: No discharge.      Conjunctiva/sclera: Conjunctivae normal.      Pupils: Pupils are equal, round, and reactive to light.   Neck:      Thyroid: No thyromegaly.      Vascular: No JVD.      Trachea: No tracheal deviation.   Cardiovascular:      Rate and Rhythm: Normal rate and regular rhythm.      Pulses:           Carotid pulses are 2+ on the right side and 2+ on the left side.       Radial pulses are 2+ on the right side and 2+ on the left side.        Dorsalis pedis pulses are 2+ on the right side and 2+ on the left side.        Posterior tibial pulses are 2+ on the right side and 2+ on the left side.      Heart sounds: Normal heart sounds. No murmur heard.     No friction rub. No gallop.   Pulmonary:      Effort: Pulmonary effort is normal. No respiratory distress.      Breath sounds: No stridor. Examination of the right-upper field reveals decreased breath sounds. Examination of the left-upper field reveals decreased breath sounds. Examination of the right-middle field reveals decreased breath sounds and rhonchi. Examination of the left-middle field reveals decreased breath sounds and rhonchi. " Examination of the right-lower field reveals decreased breath sounds. Examination of the left-lower field reveals decreased breath sounds. Decreased breath sounds and rhonchi present. No wheezing or rales.   Chest:      Chest wall: No tenderness.   Abdominal:      General: Bowel sounds are normal. There is no distension.      Palpations: Abdomen is soft. There is no mass.      Tenderness: There is no abdominal tenderness. There is no guarding or rebound.   Musculoskeletal:         General: No tenderness. Normal range of motion.      Cervical back: Normal range of motion and neck supple.      Right lower leg: Edema present.      Left lower leg: Edema present.   Lymphadenopathy:      Cervical: No cervical adenopathy.   Skin:     General: Skin is warm and dry.      Coloration: Skin is not pale.      Findings: No erythema or rash.   Neurological:      Mental Status: She is alert and oriented to person, place, and time.      Cranial Nerves: No cranial nerve deficit.      Coordination: Coordination normal.   Psychiatric:         Behavior: Behavior normal.         Thought Content: Thought content normal.         Judgment: Judgment normal.           Assessment:       1. SOB (shortness of breath)    2. Chronic systolic heart failure    3. Bilateral carotid artery disease, unspecified type    4. Coronary artery disease involving native coronary artery of native heart with angina pectoris    5. Pulmonary hypertension due to diastolic systemic ventricular dysfunction    6. Pure hypercholesterolemia    7. Thoracic aorta atherosclerosis    8. Stage 3a chronic kidney disease    9. Suprapubic catheter    10. Hypothyroidism (acquired)    11. Gastroesophageal reflux disease with esophagitis without hemorrhage    12. BMI 36.0-36.9,adult    13. Sleep apnea, unspecified type    14. Lymphedema      69 y/o pt with hx and presentation as above. Bilateral LE edema improved, however, intermittently noncompliant with lasix and drinks to much  fluids. Had an extensive discussion regarding this and will continue with lasix 80 mg BID for now. Check BMP today. Looks like DD is not the only contributor to her SOB, and needs continued management of pulmonary issues. Agree with wrapping of legs for lymphedema and will refer to lymphedema clinic.    Has multiple comorbidities and likely symptoms multifactorial. Needs to lose weight. Discussed the etiology, evaluation, and management of CHF, obesity, lymphedema, HTN, HLD, PHTN. Discussed the importance of med compliance, heart healthy diet, and regular exercise.        Plan:       -Continue current medical management  -Lymphedema clinic referral  -BMP  -f/u in 3 months

## 2023-08-31 ENCOUNTER — TELEPHONE (OUTPATIENT)
Dept: REHABILITATION | Facility: HOSPITAL | Age: 68
End: 2023-08-31
Payer: MEDICARE

## 2023-08-31 ENCOUNTER — DOCUMENTATION ONLY (OUTPATIENT)
Dept: REHABILITATION | Facility: HOSPITAL | Age: 68
End: 2023-08-31
Payer: MEDICARE

## 2023-08-31 LAB — BACTERIA UR CULT: ABNORMAL

## 2023-09-01 ENCOUNTER — PATIENT MESSAGE (OUTPATIENT)
Dept: UROLOGY | Facility: CLINIC | Age: 68
End: 2023-09-01
Payer: MEDICARE

## 2023-09-01 RX ORDER — CIPROFLOXACIN 500 MG/1
500 TABLET ORAL 2 TIMES DAILY
Qty: 6 TABLET | Refills: 0 | Status: SHIPPED | OUTPATIENT
Start: 2023-09-01 | End: 2023-09-04

## 2023-09-06 ENCOUNTER — HOSPITAL ENCOUNTER (OUTPATIENT)
Dept: WOUND CARE | Facility: HOSPITAL | Age: 68
Discharge: HOME | End: 2023-09-06
Attending: SURGERY
Payer: MEDICARE

## 2023-09-06 VITALS — SYSTOLIC BLOOD PRESSURE: 140 MMHG | DIASTOLIC BLOOD PRESSURE: 64 MMHG | TEMPERATURE: 96 F | HEART RATE: 71 BPM

## 2023-09-06 DIAGNOSIS — I89.0 LYMPHEDEMA OF BOTH LOWER EXTREMITIES: Primary | ICD-10-CM

## 2023-09-06 PROCEDURE — 99203 OFFICE O/P NEW LOW 30 MIN: CPT

## 2023-09-06 NOTE — PROGRESS NOTES
"                     W[facility]    Subjective:       Patient ID: Tasha Hawley is a 68 y.o. female.    Chief Complaint: Wound Consult (Lymphedema both legs)    69 y/o female here with BLE lymphedema. Hx of CAD, CHF. No open areas noted. Patient reports having lymphedema pumps that she does not use. States " they don't work". Takes Lasix 160mg po daily. Has Medical Team of [place] applying unna boots bilaterally 3 x weekly. Encouraged patient to elevate BLE as much as possible. F/U with PCP Dr. Hodges for recent fall and foot injury. Referral placed to [facility]. Return as needed.    Review of Systems   All other systems reviewed and are negative.        Objective:     Vitals:    09/06/23 1510   BP: (!) 140/64   Pulse: 71   Temp: 96.4 °F (35.8 °C)         Physical Exam       Altered Skin Integrity 09/06/23 1000 Left lower Leg Other (comment) (Active)   09/06/23 1000   Altered Skin Integrity Present on Admission - Did Patient arrive to the hospital with altered skin?: yes   Side: Left   Orientation: lower   Location: Leg   Wound Number:    Is this injury device related?: No   Primary Wound Type: Other   Description of Altered Skin Integrity:    Ankle-Brachial Index:    Pulses:    Removal Indication and Assessment:    Wound Outcome:    (Retired) Wound Length (cm):    (Retired) Wound Width (cm):    (Retired) Depth (cm):    Wound Description (Comments):    Removal Indications:    Wound Image   09/06/23 1434   Dressing Appearance Open to air 09/06/23 1434   Drainage Amount None 09/06/23 1434   Appearance Pink;Dry 09/06/23 1434   Tissue loss description Not applicable 09/06/23 1434   Red (%), Wound Tissue Color 100 % 09/06/23 1434   Periwound Area Edematous;Dry 09/06/23 1434   Care Cleansed with:;Soap and water 09/06/23 1434   Periwound Care Moisturizer applied 09/06/23 1434   Compression Tubular elasticized bandage 09/06/23 1434            Altered Skin Integrity 09/06/23 " 1000 Right lower Leg Other (comment) (Active)   09/06/23 1000   Altered Skin Integrity Present on Admission - Did Patient arrive to the hospital with altered skin?: yes   Side: Right   Orientation: lower   Location: Leg   Wound Number:    Is this injury device related?: No   Primary Wound Type: Other   Description of Altered Skin Integrity:    Ankle-Brachial Index:    Pulses:    Removal Indication and Assessment:    Wound Outcome:    (Retired) Wound Length (cm):    (Retired) Wound Width (cm):    (Retired) Depth (cm):    Wound Description (Comments):    Removal Indications:    Wound Image   09/06/23 1434   Dressing Appearance Open to air 09/06/23 1434   Drainage Amount None 09/06/23 1434   Appearance Pink;Dry 09/06/23 1434   Tissue loss description Not applicable 09/06/23 1434   Red (%), Wound Tissue Color 100 % 09/06/23 1434   Periwound Area Edematous 09/06/23 1434   Wound Edges Rolled/closed 09/06/23 1434   Care Cleansed with:;Soap and water 09/06/23 1434   Periwound Care Moisturizer applied 09/06/23 1434   Compression Tubular elasticized bandage 09/06/23 1434         Assessment/Plan:         ICD-10-CM ICD-9-CM   1. Lymphedema of both lower extremities  I89.0 457.1           Tissue pathology and/or culture taken:  [] Yes [x] No   Sharp debridement performed:   [] Yes [x] No   Labs ordered this visit:   [] Yes [x] No   Imaging ordered this visit:   [] Yes [x] No           Orders Placed This Encounter   Procedures    Ambulatory referral/consult to Physical/Occupational Therapy     Standing Status:   Future     Standing Expiration Date:   10/6/2024     Referral Priority:   Routine     Referral Type:   Physical Medicine     Referral Reason:   Specialty Services Required     Number of Visits Requested:   1    Change dressing     Patient referred to [facility].  F/U with own PCP.  Return as needed.        Follow up return as needed.            This includes face to face time and non-face to face time  preparing to see the patient (eg, review of tests), obtaining and/or reviewing separately obtained history, documenting clinical information in the electronic or other health record, independently interpreting results and communicating results to the patient/family/caregiver, or care coordinator.

## 2023-09-07 ENCOUNTER — CLINICAL SUPPORT (OUTPATIENT)
Dept: REHABILITATION | Facility: HOSPITAL | Age: 68
End: 2023-09-07
Attending: INTERNAL MEDICINE
Payer: MEDICARE

## 2023-09-07 ENCOUNTER — OFFICE VISIT (OUTPATIENT)
Dept: URGENT CARE | Facility: CLINIC | Age: 68
End: 2023-09-07
Payer: MEDICARE

## 2023-09-07 VITALS
SYSTOLIC BLOOD PRESSURE: 105 MMHG | OXYGEN SATURATION: 91 % | RESPIRATION RATE: 16 BRPM | HEART RATE: 74 BPM | DIASTOLIC BLOOD PRESSURE: 69 MMHG | TEMPERATURE: 98 F | BODY MASS INDEX: 36.65 KG/M2 | WEIGHT: 220 LBS | HEIGHT: 65 IN

## 2023-09-07 DIAGNOSIS — S92.414A CLOSED NONDISPLACED FRACTURE OF PROXIMAL PHALANX OF RIGHT GREAT TOE, INITIAL ENCOUNTER: Primary | ICD-10-CM

## 2023-09-07 DIAGNOSIS — T14.90XA TRAUMA: ICD-10-CM

## 2023-09-07 DIAGNOSIS — R13.10 DYSPHAGIA, UNSPECIFIED TYPE: ICD-10-CM

## 2023-09-07 DIAGNOSIS — J39.2 PHARYNGEAL DISORDER: ICD-10-CM

## 2023-09-07 DIAGNOSIS — S92.001A CLOSED NONDISPLACED FRACTURE OF RIGHT CALCANEUS, UNSPECIFIED PORTION OF CALCANEUS, INITIAL ENCOUNTER: ICD-10-CM

## 2023-09-07 PROCEDURE — 73610 X-RAY EXAM OF ANKLE: CPT | Mod: FY,RT,S$GLB, | Performed by: RADIOLOGY

## 2023-09-07 PROCEDURE — 73610 X-RAY EXAM OF ANKLE: CPT | Mod: FY,LT,S$GLB, | Performed by: RADIOLOGY

## 2023-09-07 PROCEDURE — 73630 X-RAY EXAM OF FOOT: CPT | Mod: FY,RT,S$GLB, | Performed by: RADIOLOGY

## 2023-09-07 PROCEDURE — 73630 XR FOOT COMPLETE 3 VIEW LEFT: ICD-10-PCS | Mod: FY,LT,S$GLB, | Performed by: RADIOLOGY

## 2023-09-07 PROCEDURE — 99214 PR OFFICE/OUTPT VISIT, EST, LEVL IV, 30-39 MIN: ICD-10-PCS | Mod: S$GLB,,, | Performed by: NURSE PRACTITIONER

## 2023-09-07 PROCEDURE — 92526 ORAL FUNCTION THERAPY: CPT

## 2023-09-07 PROCEDURE — 73610 XR ANKLE COMPLETE 3 VIEW LEFT: ICD-10-PCS | Mod: FY,LT,S$GLB, | Performed by: RADIOLOGY

## 2023-09-07 PROCEDURE — 73630 X-RAY EXAM OF FOOT: CPT | Mod: FY,LT,S$GLB, | Performed by: RADIOLOGY

## 2023-09-07 PROCEDURE — 99214 OFFICE O/P EST MOD 30 MIN: CPT | Mod: S$GLB,,, | Performed by: NURSE PRACTITIONER

## 2023-09-07 NOTE — PROGRESS NOTES
"[facility]  Speech Therapy Treatment Note- Dysphagia  Date: 9/7/2023     Name: Tasha Hawley   MRN: [id number]   Therapy Diagnosis:   Encounter Diagnoses   Name Primary?    Pharyngeal disorder     Dysphagia, unspecified type    Physician: Prince Vance MD  Physician Orders: Ambulatory Referral to Speech Therapy   Medical Diagnosis: Pharyngeal disorder [J39.2], Dysphagia, unspecified type [R13.10]    Visit #/ Visits Authorized: 2/ 1 ** Authorizations still pending   Date of Evaluation:  8/14/2023  Insurance Authorization Period: 8/21/2023 - 8/20/2024  Plan of Care Expiration Date:    10/13/2023  Extended Plan of Care:  N/A   Progress Note: 10/5/2023     Time In:  3:30PM  Time Out:  4:09PM  Total Billable Time: 39 mins     Precautions: Standard, Fall, and Dysphagia  Subjective:   Patient reports: She continues to have difficulty eating and barely can chew her food. Yesterday she "threw" her spaghetti up because she felt like she was choking.   Home exercise program not yet established.  Response to previous treatment: Previous treatment not provided  Pain Scale: 9/10 on a Visual Analog Scale currently.  Pain Location: Lower back and legs.  Objective:   UNTIMED  Procedure   Swallowing and Oral Function Evaluation        Short Term Goals: (4 weeks) Current Progress:   Patient will complete effortful swallow 60-90x per session to improve tongue base retraction.     Progressing/ Not Met 9/7/2023   Education and instruction provided to patient and .    2. Patient will complete mendelsohn maneuver 30-45x per session to improve laryngeal vestibule closure.     Progressing/ Not Met 9/7/2023   Not addressed in today's session.      3.  Patient will complete Chin Tuck Against Resistance (CTAR) isometric hold 3x 1 minute.     Progressing/ Not Met 9/7/2023   Patient completed CTAR isometric hold 1 time for 1 minute.    4.  Patient will complete Chin Tuck Against Resistance (CTAR) repetition 30-90x " per session     Progressing/ Not Met 2023   Education and instruction provided to patient and .       Patient Education/Response:   Patient educated regarding the followin. Plan of care  2. Effortful swallow  3. CTAR    Home program established: Program modified based on patient progress  Patient verbalized understanding to all above education provided.     See Electronic Medical Record under Patient Instructions for exercises provided throughout therapy.  Assessment:   Tasha arrived to session with increased lethargy. Patient was oriented to self, place, and time. However was confused about her current situation and why she was here. Patient presented with reduced alertness. Patient's  was present during session. Patient noted to fall asleep intermittently during the education and instruction being provided about current short-term goals and swallowing strategies. Patient's  reported he would make sure to relay the information back to Tasha at a later date when she was more awake. Due to patient's lethargy, PO trials of liquids or food was not attempted due to concerns of high risk for aspiration. Patient was able to conduct only 1 isometric hold for 1 minute.    Tasha is progressing well towards her goals. Current goals remain appropriate. Goals to be updated as necessary.     Patient prognosis is Fair. Patient will continue to benefit from skilled outpatient speech and language therapy to address the deficits listed in the problem list on initial evaluation, provide patient/family education and to maximize patient's level of independence in the home and community environment.   Medical necessity is demonstrated by the following IMPAIRMENTS:  Dysphagia: Impaired swallow physiology results in decreased efficiency and effectiveness of oral intake.    Barriers to Therapy: multiple medical appointments  Patient's spiritual, cultural and educational needs considered and patient agreeable  to plan of care and goals.  Plan:   Continue Plan of Care with focus on rehabilitation and compensation for dysphagia therapy.    Thang Ordoñez CCC-SLP   9/7/2023

## 2023-09-08 NOTE — PATIENT INSTRUCTIONS
Follow up urgently with podiatry.  A referral was placed for you, call [phone] to schedule appointment.    Please drink plenty of fluids.  Please get plenty of rest.  Please return here or go to the Emergency Department for any concerns or worsening of condition.  If you were prescribed a narcotic medication, do not drive or operate heavy equipment or machinery while taking these medications.  If you were not prescribed an anti-inflammatory medication, and if you do not have any history of stomach/intestinal ulcers, or kidney disease, or are not taking a blood thinner such as Coumadin, Plavix, Pradaxa, Eloquis, or Xaralta for example, it is OK to take over the counter Ibuprofen or Advil or Motrin or Aleve as directed.  Do not take these medications on an empty stomach.  Rest, ice, compression and elevation to the affected joint or limb as needed.  Please follow up with your primary care doctor or specialist as needed.    If you  smoke, please stop smoking.

## 2023-09-09 ENCOUNTER — HOSPITAL ENCOUNTER (OUTPATIENT)
Facility: HOSPITAL | Age: 68
Discharge: HOME | End: 2023-09-14
Attending: EMERGENCY MEDICINE | Admitting: STUDENT IN AN ORGANIZED HEALTH CARE EDUCATION/TRAINING PROGRAM
Payer: MEDICARE

## 2023-09-09 DIAGNOSIS — L03.119 CELLULITIS OF LOWER EXTREMITY, UNSPECIFIED LATERALITY: Primary | ICD-10-CM

## 2023-09-09 DIAGNOSIS — R60.0 LOWER EXTREMITY EDEMA: ICD-10-CM

## 2023-09-09 DIAGNOSIS — R07.9 CHEST PAIN: ICD-10-CM

## 2023-09-09 DIAGNOSIS — S92.411A DISPLACED FRACTURE OF PROXIMAL PHALANX OF RIGHT GREAT TOE, INITIAL ENCOUNTER FOR CLOSED FRACTURE: ICD-10-CM

## 2023-09-09 DIAGNOSIS — R06.02 SOB (SHORTNESS OF BREATH): ICD-10-CM

## 2023-09-09 DIAGNOSIS — R06.02 SHORTNESS OF BREATH: ICD-10-CM

## 2023-09-09 DIAGNOSIS — I50.32 CHRONIC DIASTOLIC HEART FAILURE: ICD-10-CM

## 2023-09-09 DIAGNOSIS — M79.671 PAIN OF RIGHT HEEL: ICD-10-CM

## 2023-09-09 PROBLEM — S92.919A FRACTURE, PHALANX, FOOT: Status: ACTIVE | Noted: 2023-09-09

## 2023-09-09 LAB
ALBUMIN SERPL BCP-MCNC: 3.7 G/DL (ref 3.5–5.2)
ALP SERPL-CCNC: 162 U/L (ref 55–135)
ALT SERPL W/O P-5'-P-CCNC: 7 U/L (ref 10–44)
ANION GAP SERPL CALC-SCNC: 13 MMOL/L (ref 8–16)
AST SERPL-CCNC: 12 U/L (ref 10–40)
BACTERIA #/AREA URNS HPF: ABNORMAL /HPF
BASOPHILS # BLD AUTO: 0.02 K/UL (ref 0–0.2)
BASOPHILS NFR BLD: 0.3 % (ref 0–1.9)
BILIRUB SERPL-MCNC: 0.5 MG/DL (ref 0.1–1)
BILIRUB UR QL STRIP: NEGATIVE
BNP SERPL-MCNC: 13 PG/ML (ref 0–99)
BUN SERPL-MCNC: 7 MG/DL (ref 8–23)
CALCIUM SERPL-MCNC: 9.5 MG/DL (ref 8.7–10.5)
CHLORIDE SERPL-SCNC: 97 MMOL/L (ref 95–110)
CLARITY UR: ABNORMAL
CO2 SERPL-SCNC: 30 MMOL/L (ref 23–29)
COLOR UR: YELLOW
CREAT SERPL-MCNC: 0.9 MG/DL (ref 0.5–1.4)
CTP QC/QA: YES
DIFFERENTIAL METHOD: ABNORMAL
EOSINOPHIL # BLD AUTO: 0.3 K/UL (ref 0–0.5)
EOSINOPHIL NFR BLD: 3.8 % (ref 0–8)
ERYTHROCYTE [DISTWIDTH] IN BLOOD BY AUTOMATED COUNT: 15.2 % (ref 11.5–14.5)
EST. GFR  (NO RACE VARIABLE): >60 ML/MIN/1.73 M^2
GLUCOSE SERPL-MCNC: 105 MG/DL (ref 70–110)
GLUCOSE UR QL STRIP: NEGATIVE
HCT VFR BLD AUTO: 37.3 % (ref 37–48.5)
HGB BLD-MCNC: 11.6 G/DL (ref 12–16)
HGB UR QL STRIP: ABNORMAL
HYALINE CASTS #/AREA URNS LPF: 1 /LPF
IMM GRANULOCYTES # BLD AUTO: 0.02 K/UL (ref 0–0.04)
IMM GRANULOCYTES NFR BLD AUTO: 0.3 % (ref 0–0.5)
KETONES UR QL STRIP: NEGATIVE
LACTATE SERPL-SCNC: 1.2 MMOL/L (ref 0.5–2.2)
LACTATE SERPL-SCNC: 1.4 MMOL/L (ref 0.5–2.2)
LEUKOCYTE ESTERASE UR QL STRIP: ABNORMAL
LYMPHOCYTES # BLD AUTO: 1.3 K/UL (ref 1–4.8)
LYMPHOCYTES NFR BLD: 18.3 % (ref 18–48)
MCH RBC QN AUTO: 26.5 PG (ref 27–31)
MCHC RBC AUTO-ENTMCNC: 31.1 G/DL (ref 32–36)
MCV RBC AUTO: 85 FL (ref 82–98)
MICROSCOPIC COMMENT: ABNORMAL
MONOCYTES # BLD AUTO: 0.8 K/UL (ref 0.3–1)
MONOCYTES NFR BLD: 10.8 % (ref 4–15)
NEUTROPHILS # BLD AUTO: 4.7 K/UL (ref 1.8–7.7)
NEUTROPHILS NFR BLD: 66.5 % (ref 38–73)
NITRITE UR QL STRIP: NEGATIVE
NRBC BLD-RTO: 0 /100 WBC
PH UR STRIP: 7 [PH] (ref 5–8)
PLATELET # BLD AUTO: 245 K/UL (ref 150–450)
PMV BLD AUTO: 9.5 FL (ref 9.2–12.9)
POTASSIUM SERPL-SCNC: 3.1 MMOL/L (ref 3.5–5.1)
PROCALCITONIN SERPL IA-MCNC: 0.03 NG/ML
PROT SERPL-MCNC: 7.5 G/DL (ref 6–8.4)
PROT UR QL STRIP: NEGATIVE
RBC # BLD AUTO: 4.37 M/UL (ref 4–5.4)
RBC #/AREA URNS HPF: 6 /HPF (ref 0–4)
SARS-COV-2 RDRP RESP QL NAA+PROBE: NEGATIVE
SODIUM SERPL-SCNC: 140 MMOL/L (ref 136–145)
SP GR UR STRIP: 1 (ref 1–1.03)
TROPONIN I SERPL DL<=0.01 NG/ML-MCNC: <0.006 NG/ML (ref 0–0.03)
URN SPEC COLLECT METH UR: ABNORMAL
UROBILINOGEN UR STRIP-ACNC: NEGATIVE EU/DL
WBC # BLD AUTO: 7.11 K/UL (ref 3.9–12.7)
WBC #/AREA URNS HPF: 9 /HPF (ref 0–5)
YEAST URNS QL MICRO: ABNORMAL

## 2023-09-09 PROCEDURE — 63600175 PHARM REV CODE 636 W HCPCS: Performed by: EMERGENCY MEDICINE

## 2023-09-09 PROCEDURE — 25000003 PHARM REV CODE 250: Performed by: EMERGENCY MEDICINE

## 2023-09-09 PROCEDURE — 93010 EKG 12-LEAD: ICD-10-PCS | Mod: ,,, | Performed by: INTERNAL MEDICINE

## 2023-09-09 PROCEDURE — 87635 SARS-COV-2 COVID-19 AMP PRB: CPT | Performed by: EMERGENCY MEDICINE

## 2023-09-09 PROCEDURE — G0378 HOSPITAL OBSERVATION PER HR: HCPCS

## 2023-09-09 PROCEDURE — 99285 EMERGENCY DEPT VISIT HI MDM: CPT | Mod: 25

## 2023-09-09 PROCEDURE — 96365 THER/PROPH/DIAG IV INF INIT: CPT

## 2023-09-09 PROCEDURE — 96375 TX/PRO/DX INJ NEW DRUG ADDON: CPT

## 2023-09-09 PROCEDURE — 83880 ASSAY OF NATRIURETIC PEPTIDE: CPT | Performed by: EMERGENCY MEDICINE

## 2023-09-09 PROCEDURE — 83605 ASSAY OF LACTIC ACID: CPT | Performed by: EMERGENCY MEDICINE

## 2023-09-09 PROCEDURE — 84484 ASSAY OF TROPONIN QUANT: CPT | Performed by: EMERGENCY MEDICINE

## 2023-09-09 PROCEDURE — 80053 COMPREHEN METABOLIC PANEL: CPT | Performed by: EMERGENCY MEDICINE

## 2023-09-09 PROCEDURE — 93010 ELECTROCARDIOGRAM REPORT: CPT | Mod: ,,, | Performed by: INTERNAL MEDICINE

## 2023-09-09 PROCEDURE — 93005 ELECTROCARDIOGRAM TRACING: CPT

## 2023-09-09 PROCEDURE — 25000003 PHARM REV CODE 250: Performed by: STUDENT IN AN ORGANIZED HEALTH CARE EDUCATION/TRAINING PROGRAM

## 2023-09-09 PROCEDURE — 84145 PROCALCITONIN (PCT): CPT | Performed by: EMERGENCY MEDICINE

## 2023-09-09 PROCEDURE — 85025 COMPLETE CBC W/AUTO DIFF WBC: CPT | Performed by: EMERGENCY MEDICINE

## 2023-09-09 PROCEDURE — 81000 URINALYSIS NONAUTO W/SCOPE: CPT | Performed by: EMERGENCY MEDICINE

## 2023-09-09 PROCEDURE — 87040 BLOOD CULTURE FOR BACTERIA: CPT | Performed by: EMERGENCY MEDICINE

## 2023-09-09 RX ORDER — IBUPROFEN 200 MG
16 TABLET ORAL
Status: DISCONTINUED | OUTPATIENT
Start: 2023-09-10 | End: 2023-09-14 | Stop reason: HOSPADM

## 2023-09-09 RX ORDER — POTASSIUM CHLORIDE 20 MEQ/1
40 TABLET, EXTENDED RELEASE ORAL ONCE
Status: COMPLETED | OUTPATIENT
Start: 2023-09-09 | End: 2023-09-09

## 2023-09-09 RX ORDER — ASPIRIN 81 MG/1
81 TABLET ORAL DAILY
Status: DISCONTINUED | OUTPATIENT
Start: 2023-09-10 | End: 2023-09-14 | Stop reason: HOSPADM

## 2023-09-09 RX ORDER — ENOXAPARIN SODIUM 100 MG/ML
40 INJECTION SUBCUTANEOUS EVERY 24 HOURS
Status: DISCONTINUED | OUTPATIENT
Start: 2023-09-10 | End: 2023-09-14 | Stop reason: HOSPADM

## 2023-09-09 RX ORDER — IBUPROFEN 200 MG
24 TABLET ORAL
Status: DISCONTINUED | OUTPATIENT
Start: 2023-09-10 | End: 2023-09-14 | Stop reason: HOSPADM

## 2023-09-09 RX ORDER — ATORVASTATIN CALCIUM 40 MG/1
80 TABLET, FILM COATED ORAL DAILY
Status: DISCONTINUED | OUTPATIENT
Start: 2023-09-10 | End: 2023-09-14 | Stop reason: HOSPADM

## 2023-09-09 RX ORDER — SODIUM CHLORIDE 0.9 % (FLUSH) 0.9 %
10 SYRINGE (ML) INJECTION EVERY 12 HOURS PRN
Status: DISCONTINUED | OUTPATIENT
Start: 2023-09-10 | End: 2023-09-14 | Stop reason: HOSPADM

## 2023-09-09 RX ORDER — MORPHINE SULFATE 2 MG/ML
2 INJECTION, SOLUTION INTRAMUSCULAR; INTRAVENOUS
Status: COMPLETED | OUTPATIENT
Start: 2023-09-09 | End: 2023-09-09

## 2023-09-09 RX ORDER — TRAZODONE HYDROCHLORIDE 100 MG/1
300 TABLET ORAL NIGHTLY
Status: DISCONTINUED | OUTPATIENT
Start: 2023-09-10 | End: 2023-09-10

## 2023-09-09 RX ORDER — POTASSIUM CHLORIDE 20 MEQ/1
20 TABLET, EXTENDED RELEASE ORAL DAILY
Status: DISCONTINUED | OUTPATIENT
Start: 2023-09-10 | End: 2023-09-14 | Stop reason: HOSPADM

## 2023-09-09 RX ORDER — FLUOXETINE HYDROCHLORIDE 20 MG/1
60 CAPSULE ORAL DAILY
Status: DISCONTINUED | OUTPATIENT
Start: 2023-09-10 | End: 2023-09-14 | Stop reason: HOSPADM

## 2023-09-09 RX ORDER — FUROSEMIDE 40 MG/1
80 TABLET ORAL 2 TIMES DAILY
Status: DISCONTINUED | OUTPATIENT
Start: 2023-09-10 | End: 2023-09-11

## 2023-09-09 RX ORDER — GLUCAGON 1 MG
1 KIT INJECTION
Status: DISCONTINUED | OUTPATIENT
Start: 2023-09-10 | End: 2023-09-14 | Stop reason: HOSPADM

## 2023-09-09 RX ORDER — NALOXONE HCL 0.4 MG/ML
0.02 VIAL (ML) INJECTION
Status: DISCONTINUED | OUTPATIENT
Start: 2023-09-10 | End: 2023-09-14 | Stop reason: HOSPADM

## 2023-09-09 RX ORDER — FUROSEMIDE 10 MG/ML
40 INJECTION INTRAMUSCULAR; INTRAVENOUS
Status: COMPLETED | OUTPATIENT
Start: 2023-09-09 | End: 2023-09-09

## 2023-09-09 RX ORDER — LEVOTHYROXINE SODIUM 75 UG/1
150 TABLET ORAL
Status: DISCONTINUED | OUTPATIENT
Start: 2023-09-10 | End: 2023-09-14 | Stop reason: HOSPADM

## 2023-09-09 RX ADMIN — PIPERACILLIN AND TAZOBACTAM 4.5 G: 4; .5 INJECTION, POWDER, LYOPHILIZED, FOR SOLUTION INTRAVENOUS; PARENTERAL at 08:09

## 2023-09-09 RX ADMIN — FUROSEMIDE 40 MG: 10 INJECTION, SOLUTION INTRAMUSCULAR; INTRAVENOUS at 08:09

## 2023-09-09 RX ADMIN — POTASSIUM CHLORIDE 40 MEQ: 1500 TABLET, EXTENDED RELEASE ORAL at 11:09

## 2023-09-09 RX ADMIN — MORPHINE SULFATE 2 MG: 2 INJECTION, SOLUTION INTRAMUSCULAR; INTRAVENOUS at 08:09

## 2023-09-09 NOTE — FIRST PROVIDER EVALUATION
Emergency Department TeleTriage Encounter Note      CHIEF COMPLAINT    Chief Complaint   Patient presents with    Leg Pain     Pt has fall on Wednesday. Went to Urgent Care on yesterday, Dx of fx right great toe and ankle. Bilateral lower extremities warm, red and swollen, greater on the right. Pt seen by wound care yesterday and told that she could not be treated by them because there is no open wound. Pt has home health treatment 2 x/week.       VITAL SIGNS   Initial Vitals [09/09/23 1717]   BP Pulse Resp Temp SpO2   (!) 91/52 76 18 98.5 °F (36.9 °C) (!) 92 %      MAP       --            ALLERGIES    Review of patient's allergies indicates:   Allergen Reactions    (d)-limonene flavor      Other reaction(s): difficult intubation  Other reaction(s): Difficulty breathing    Bactrim [sulfamethoxazole-trimethoprim] Anaphylaxis    Benadryl [diphenhydramine hcl] Shortness Of Breath    Fentanyl Itching, Nausea And Vomiting and Swelling             Imitrex [sumatriptan succinate] Shortness Of Breath    Percocet [oxycodone-acetaminophen] Shortness Of Breath    Topamax [topiramate] Shortness Of Breath    Vancomycin Anaphylaxis     Rash    Butorphanol tartrate     Darvocet a500 [propoxyphene n-acetaminophen]      Other reaction(s): Difficulty breathing    Evening primrose (oenothera biennis)     Lyrica [pregabalin] Other (See Comments)     tremors    White petrolatum-zinc     Zinc oxide-white petrolatum      Other reaction(s): Difficulty breathing    Latex, natural rubber Itching and Rash    Phenytoin Rash and Other (See Comments)     Trouble breathing       PROVIDER TRIAGE NOTE  This is a teletriage evaluation of a 68 y.o. female presenting to the ED with c/o right leg pain.  Pt states she had a fall on Wednesday.  Founf to have broken ankle and toe.  Pt states pain worse today.       PE: slightly hypotensive on exam.  Speaking in full sentences.      Plan: imaging, montoring    Limited physical exam via telehealth: The  patient is awake, alert, answering questions appropriately and is not in respiratory distress. All ED beds are full at present; patient notified of this status.  Patient seen and medically screened by Nurse Practitioner via teletriage.      Initial orders will be placed and care will be transferred to an alternate provider when patient is roomed for a full evaluation. Any additional orders and the final disposition will be determined by that provider.         ORDERS  Labs Reviewed - No data to display    ED Orders (720h ago, onward)      Start Ordered     Status Ordering Provider    09/09/23 1743 09/09/23 1742  X-Ray Tibia Fibula 2 View Right  1 time imaging         Ordered CYNDY GILBERT    09/09/23 1743 09/09/23 1742  X-Ray Ankle Complete Right  1 time imaging         Ordered CYNDY GILBERT.    09/09/23 1743 09/09/23 1742  X-Ray Foot Complete Right  1 time imaging         Ordered CYNDY GILBERT              Virtual Visit Note: The provider triage portion of this emergency department evaluation and documentation was performed via [employer], a HIPAA-compliant telemedicine application, in concert with a tele-presenter in the room. A face to face patient evaluation with one of my colleagues will occur once the patient is placed in an emergency department room.      DISCLAIMER: This note was prepared with [employer]*Log[employer] voice recognition transcription software. Garbled syntax, mangled pronouns, and other bizarre constructions may be attributed to that software system.

## 2023-09-10 PROCEDURE — 63600175 PHARM REV CODE 636 W HCPCS: Performed by: FAMILY MEDICINE

## 2023-09-10 PROCEDURE — 94761 N-INVAS EAR/PLS OXIMETRY MLT: CPT

## 2023-09-10 PROCEDURE — 63600175 PHARM REV CODE 636 W HCPCS: Performed by: STUDENT IN AN ORGANIZED HEALTH CARE EDUCATION/TRAINING PROGRAM

## 2023-09-10 PROCEDURE — 25000003 PHARM REV CODE 250: Performed by: STUDENT IN AN ORGANIZED HEALTH CARE EDUCATION/TRAINING PROGRAM

## 2023-09-10 PROCEDURE — 96366 THER/PROPH/DIAG IV INF ADDON: CPT

## 2023-09-10 PROCEDURE — 63700000 PHARM REV CODE 250 ALT 637 W/O HCPCS: Performed by: FAMILY MEDICINE

## 2023-09-10 PROCEDURE — 96372 THER/PROPH/DIAG INJ SC/IM: CPT | Performed by: STUDENT IN AN ORGANIZED HEALTH CARE EDUCATION/TRAINING PROGRAM

## 2023-09-10 PROCEDURE — 96376 TX/PRO/DX INJ SAME DRUG ADON: CPT

## 2023-09-10 PROCEDURE — 99900035 HC TECH TIME PER 15 MIN (STAT)

## 2023-09-10 PROCEDURE — G0378 HOSPITAL OBSERVATION PER HR: HCPCS

## 2023-09-10 PROCEDURE — 96365 THER/PROPH/DIAG IV INF INIT: CPT | Mod: 59

## 2023-09-10 PROCEDURE — 94640 AIRWAY INHALATION TREATMENT: CPT

## 2023-09-10 PROCEDURE — 25000242 PHARM REV CODE 250 ALT 637 W/ HCPCS: Performed by: STUDENT IN AN ORGANIZED HEALTH CARE EDUCATION/TRAINING PROGRAM

## 2023-09-10 RX ORDER — QUETIAPINE FUMARATE 100 MG/1
200 TABLET, FILM COATED ORAL NIGHTLY
Status: DISCONTINUED | OUTPATIENT
Start: 2023-09-10 | End: 2023-09-14 | Stop reason: HOSPADM

## 2023-09-10 RX ORDER — HYDROCODONE BITARTRATE AND ACETAMINOPHEN 10; 325 MG/1; MG/1
1 TABLET ORAL EVERY 6 HOURS PRN
Status: DISCONTINUED | OUTPATIENT
Start: 2023-09-10 | End: 2023-09-14 | Stop reason: HOSPADM

## 2023-09-10 RX ORDER — SENNOSIDES 8.6 MG/1
3 TABLET ORAL NIGHTLY
Status: DISCONTINUED | OUTPATIENT
Start: 2023-09-10 | End: 2023-09-14 | Stop reason: HOSPADM

## 2023-09-10 RX ORDER — HYDROMORPHONE HYDROCHLORIDE 1 MG/ML
0.5 INJECTION, SOLUTION INTRAMUSCULAR; INTRAVENOUS; SUBCUTANEOUS EVERY 6 HOURS PRN
Status: DISCONTINUED | OUTPATIENT
Start: 2023-09-10 | End: 2023-09-14 | Stop reason: HOSPADM

## 2023-09-10 RX ORDER — TRAZODONE HYDROCHLORIDE 100 MG/1
300 TABLET ORAL NIGHTLY
Status: DISCONTINUED | OUTPATIENT
Start: 2023-09-10 | End: 2023-09-14 | Stop reason: HOSPADM

## 2023-09-10 RX ORDER — FLUCONAZOLE 100 MG/1
100 TABLET ORAL DAILY
Status: DISCONTINUED | OUTPATIENT
Start: 2023-09-10 | End: 2023-09-14 | Stop reason: HOSPADM

## 2023-09-10 RX ADMIN — ENOXAPARIN SODIUM 40 MG: 40 INJECTION SUBCUTANEOUS at 05:09

## 2023-09-10 RX ADMIN — TIOTROPIUM BROMIDE INHALATION SPRAY 2 PUFF: 3.12 SPRAY, METERED RESPIRATORY (INHALATION) at 08:09

## 2023-09-10 RX ADMIN — QUETIAPINE FUMARATE 200 MG: 100 TABLET ORAL at 08:09

## 2023-09-10 RX ADMIN — FLUOXETINE 60 MG: 20 CAPSULE ORAL at 08:09

## 2023-09-10 RX ADMIN — SENNOSIDES 3 TABLET: 8.6 TABLET, FILM COATED ORAL at 08:09

## 2023-09-10 RX ADMIN — AMPICILLIN AND SULBACTAM 1.5 G: .5; 1 INJECTION, POWDER, FOR SOLUTION INTRAMUSCULAR; INTRAVENOUS at 12:09

## 2023-09-10 RX ADMIN — QUETIAPINE FUMARATE 200 MG: 100 TABLET ORAL at 01:09

## 2023-09-10 RX ADMIN — FLUCONAZOLE 100 MG: 100 TABLET ORAL at 12:09

## 2023-09-10 RX ADMIN — SODIUM CHLORIDE, POTASSIUM CHLORIDE, SODIUM LACTATE AND CALCIUM CHLORIDE 250 ML: 600; 310; 30; 20 INJECTION, SOLUTION INTRAVENOUS at 01:09

## 2023-09-10 RX ADMIN — HYDROCODONE BITARTRATE AND ACETAMINOPHEN 1 TABLET: 10; 325 TABLET ORAL at 06:09

## 2023-09-10 RX ADMIN — TRAZODONE HYDROCHLORIDE 300 MG: 100 TABLET ORAL at 08:09

## 2023-09-10 RX ADMIN — LEVOTHYROXINE SODIUM 150 MCG: 75 TABLET ORAL at 05:09

## 2023-09-10 RX ADMIN — POTASSIUM CHLORIDE 20 MEQ: 1500 TABLET, EXTENDED RELEASE ORAL at 08:09

## 2023-09-10 RX ADMIN — FUROSEMIDE 80 MG: 40 TABLET ORAL at 08:09

## 2023-09-10 RX ADMIN — AMPICILLIN AND SULBACTAM 1.5 G: .5; 1 INJECTION, POWDER, FOR SOLUTION INTRAMUSCULAR; INTRAVENOUS at 05:09

## 2023-09-10 RX ADMIN — ATORVASTATIN CALCIUM 80 MG: 40 TABLET, FILM COATED ORAL at 08:09

## 2023-09-10 RX ADMIN — HYDROCODONE BITARTRATE AND ACETAMINOPHEN 1 TABLET: 10; 325 TABLET ORAL at 01:09

## 2023-09-10 RX ADMIN — SENNOSIDES 3 TABLET: 8.6 TABLET, FILM COATED ORAL at 01:09

## 2023-09-10 RX ADMIN — ASPIRIN 81 MG: 81 TABLET, COATED ORAL at 08:09

## 2023-09-10 RX ADMIN — TRAZODONE HYDROCHLORIDE 300 MG: 100 TABLET ORAL at 01:09

## 2023-09-10 NOTE — SUBJECTIVE & OBJECTIVE
"Interval History: awake, no new complaint.     Replace potassium   Lower extremity erythema-continue Unasyn  Ortho consult pending    Review of Systems   Constitutional:  Positive for activity change. Negative for chills and fever.   Respiratory:  Positive for shortness of breath. Negative for apnea and chest tightness.    Genitourinary:  Negative for dyspareunia.   Skin:  Positive for color change.   Neurological:  Negative for dizziness, syncope and weakness.   Psychiatric/Behavioral:  Negative for agitation and confusion.      Objective:     Vital Signs (Most Recent):  Temp: 97.4 °F (36.3 °C) (09/10/23 0717)  Pulse: (!) 58 (09/10/23 0829)  Resp: 16 (09/10/23 0829)  BP: (!) 98/55 (09/10/23 0717)  SpO2: 96 % (09/10/23 0829) Vital Signs (24h Range):  Temp:  [97.4 °F (36.3 °C)-98.5 °F (36.9 °C)] 97.4 °F (36.3 °C)  Pulse:  [58-76] 58  Resp:  [14-30] 16  SpO2:  [92 %-100 %] 96 %  BP: ()/(51-62) 98/55     Weight: 100.7 kg (222 lb 0.1 oz)  Body mass index is 36.94 kg/m².    Intake/Output Summary (Last 24 hours) at 9/10/2023 0943  Last data filed at 9/10/2023 0639  Gross per 24 hour   Intake 170.05 ml   Output 3200 ml   Net -3029.95 ml         Physical Exam  Constitutional:       Appearance: Normal appearance.   HENT:      Head: Normocephalic.   Cardiovascular:      Rate and Rhythm: Normal rate and regular rhythm.   Pulmonary:      Effort: Pulmonary effort is normal.   Abdominal:      General: Abdomen is flat. Bowel sounds are normal.      Palpations: Abdomen is soft.   Musculoskeletal:         General: Swelling, tenderness, deformity and signs of injury present.      Cervical back: Normal range of motion.      Right lower leg: Edema present.      Left lower leg: Edema present.   Skin:     Findings: Erythema present.   Neurological:      Mental Status: She is alert.             Significant Labs: A1C:   Recent Labs   Lab 07/09/23  0347   HGBA1C 5.2     ABGs: No results for input(s): "PH", "PCO2", "HCO3", " ""POCSATURATED", "BE", "TOTALHB", "COHB", "METHB", "O2HB", "POCFIO2", "PO2" in the last 48 hours.  CBC:   Recent Labs   Lab 09/09/23 1945   WBC 7.11   HGB 11.6*   HCT 37.3        CMP:   Recent Labs   Lab 09/09/23 1945      K 3.1*   CL 97   CO2 30*      BUN 7*   CREATININE 0.9   CALCIUM 9.5   PROT 7.5   ALBUMIN 3.7   BILITOT 0.5   ALKPHOS 162*   AST 12   ALT 7*   ANIONGAP 13     Lactic Acid:   Recent Labs   Lab 09/09/23 1945 09/09/23  2323   LACTATE 1.4 1.2     Lipase: No results for input(s): "LIPASE" in the last 48 hours.  Lipid Panel: No results for input(s): "CHOL", "HDL", "LDLCALC", "TRIG", "CHOLHDL" in the last 48 hours.  Magnesium: No results for input(s): "MG" in the last 48 hours.  Troponin:   Recent Labs   Lab 09/09/23 1945   TROPONINI <0.006     TSH:   Recent Labs   Lab 07/03/23  0826   TSH <0.015*     Urine Culture: No results for input(s): "LABURIN" in the last 48 hours.  Urine Studies:   Recent Labs   Lab 09/09/23 2013   COLORU Yellow   APPEARANCEUA Hazy*   PHUR 7.0   SPECGRAV 1.005   PROTEINUA Negative   GLUCUA Negative   KETONESU Negative   BILIRUBINUA Negative   OCCULTUA 1+*   NITRITE Negative   UROBILINOGEN Negative   LEUKOCYTESUR 2+*   RBCUA 6*   WBCUA 9*   BACTERIA Rare   HYALINECASTS 1       Significant Imaging: I have reviewed all pertinent imaging results/findings within the past 24 hours.  "

## 2023-09-10 NOTE — ASSESSMENT & PLAN NOTE
Non purulent cellulitis in both lower ext   Start Unasyn 1.5 mg q6hr- continue  Can switch to Augmentin upon discharge

## 2023-09-10 NOTE — ED PROVIDER NOTES
Encounter Date: 9/9/2023       History     Chief Complaint   Patient presents with    Leg Pain     Pt has fall on Wednesday. Went to Urgent Care on yesterday, Dx of fx right great toe and ankle. Bilateral lower extremities warm, red and swollen, greater on the right. Pt seen by wound care yesterday and told that she could not be treated by them because there is no open wound. Pt has home health treatment 2 x/week.     Tasha Hawley is a 68 y.o. female who  has a past medical history of Anxiety, Arthritis, Bilateral lower extremity edema, Carotid artery occlusion, Cataract, CHF (congestive heart failure), Coronary artery disease, Depression, Fever blister, Hard of hearing, Hypokalemia (01/09/2023), Hyponatremia (02/04/2022), Hypothyroid, Iron deficiency anemia, Lumbar radiculopathy, Ocular migraine, Other emphysema (05/22/2023), Renal disorder, and Sleep apnea.    The patient presents to the ED due to lower extremity pain and swelling.  Patient has been having chronic swelling that is worsened over the past several days.  Lastly patient had a fall and went to urgent care and was diagnosed with a possible calcaneal fracture and toe fractures.  Patient states that prior to the fall she felt dizzy and went to ground.  Patient at this time reports shortness of breath which is chronic and somewhat worsened today she additionally reports pain and swelling to her lower extremities with increased redness.  No fevers she also reports chronic back pain.    The history is provided by the patient and the spouse.     Review of patient's allergies indicates:   Allergen Reactions    (d)-limonene flavor      Other reaction(s): difficult intubation  Other reaction(s): Difficulty breathing    Bactrim [sulfamethoxazole-trimethoprim] Anaphylaxis    Benadryl [diphenhydramine hcl] Shortness Of Breath    Fentanyl Itching, Nausea And Vomiting and Swelling             Imitrex [sumatriptan succinate] Shortness Of Breath    Percocet  [oxycodone-acetaminophen] Shortness Of Breath    Topamax [topiramate] Shortness Of Breath    Vancomycin Anaphylaxis     Rash    Butorphanol tartrate     Darvocet a500 [propoxyphene n-acetaminophen]      Other reaction(s): Difficulty breathing    Evening primrose (oenothera biennis)     Lyrica [pregabalin] Other (See Comments)     tremors    White petrolatum-zinc     Zinc oxide-white petrolatum      Other reaction(s): Difficulty breathing    Latex, natural rubber Itching and Rash    Phenytoin Rash and Other (See Comments)     Trouble breathing     Past Medical History:   Diagnosis Date    Anxiety     Arthritis     Bilateral lower extremity edema     severe chronic    Carotid artery occlusion     Cataract     CHF (congestive heart failure)     Coronary artery disease     subtotalled LAD with collateral    Depression     Fever blister     Hard of hearing     Hypokalemia 01/09/2023    Hyponatremia 02/04/2022    Hypothyroid     Iron deficiency anemia     Lumbar radiculopathy     with chronic pain    Ocular migraine     Other emphysema 05/22/2023    Renal disorder     Sleep apnea     cpap     Past Surgical History:   Procedure Laterality Date    ADENOIDECTOMY      BACK SURGERY      x 12    CARDIAC CATHETERIZATION  2016    subtotalled LAD with right to left collaterals    CATARACT EXTRACTION W/  INTRAOCULAR LENS IMPLANT Left     Dr Coleman     CYSTOSCOPIC LITHOLAPAXY N/A 6/27/2019    Procedure: CYSTOLITHOLAPAXY;  Surgeon: Shireen Mayo MD;  Location: [facility] OR [address] [place] [address];  Service: Urology;  Laterality: N/A;    CYSTOSCOPIC LITHOLAPAXY N/A 9/3/2019    Procedure: CYSTOLITHOLAPAXY;  Surgeon: Shireen Mayo MD;  Location: [facility] OR 2ND FLR;  Service: Urology;  Laterality: N/A;    CYSTOSCOPY N/A 7/13/2021    Procedure: CYSTOSCOPY;  Surgeon: Shireen Mayo MD;  Location: [facility] OR 1ST FLR;  Service: Urology;  Laterality: N/A;    CYSTOSCOPY  11/16/2021    Procedure: CYSTOSCOPY;  Surgeon: Shireen Mayo MD;  Location: [facility] OR  1ST FLR;  Service: Urology;;    CYSTOSCOPY  7/19/2022    Procedure: CYSTOSCOPY;  Surgeon: Shireen Mayo MD;  [facility];  Service: Urology;;    CYSTOSCOPY WITH INJECTION OF PERIURETHRAL BULKING AGENT  7/19/2022    Procedure: CYSTOSCOPY, WITH PERIURETHRAL BULKING AGENT INJECTION-MACROPLASTIQUE;  Surgeon: Shireen Mayo MD;  [facility];  Service: Urology;;    CYSTOSCOPY WITH INJECTION OF PERIURETHRAL BULKING AGENT N/A 3/28/2023    Procedure: CYSTOSCOPY, WITH PERIURETHRAL BULKING AGENT INJECTION;  Surgeon: Shireen Mayo MD;  [facility];  Service: Urology;  Laterality: N/A;  [place]    CYSTOSCOPY,WITH BOTULINUM TOXIN INJECTION N/A 12/13/2022    Procedure: CYSTOSCOPY,WITH BOTULINUM TOXIN INJECTION;  Surgeon: Shireen Mayo MD;  [facility];  Service: Urology;  Laterality: N/A;  300 U    CYSTOSCOPY,WITH BOTULINUM TOXIN INJECTION N/A 3/28/2023    Procedure: CYSTOSCOPY,WITH BOTULINUM TOXIN INJECTION;  Surgeon: Shireen Mayo MD;  [facility];  Service: Urology;  Laterality: N/A;  45 MIN.    300 UNITS    ESOPHAGOGASTRODUODENOSCOPY N/A 5/23/2018    Procedure: ESOPHAGOGASTRODUODENOSCOPY (EGD);  Surgeon: Prince Vance MD;  Location: [facility] (4TH FLR);  Service: Endoscopy;  Laterality: N/A;  r/s 'd per Dr. Vance due to family emergency- ER    ESOPHAGOGASTRODUODENOSCOPY N/A 6/23/2023    Procedure: EGD (ESOPHAGOGASTRODUODENOSCOPY);  Surgeon: Juaquin Barry MD;  Location: [facility] ENDO (2ND FLR);  Service: Endoscopy;  Laterality: N/A;  Refferal: PRINCE VANCE  tele enc 5/18/23  dx: history of a Nissen fundoplication  Additional Scheduling Information:  On home oxygen 2nd floor for airway protection also with a pain pump elevated BMI close to 40   Prep Gastroparesis   ins    HYSTERECTOMY  1975    endometriosis    INJECTION OF BOTULINUM TOXIN TYPE A  7/13/2021    Procedure: INJECTION, BOTULINUM TOXIN, 200units;  Surgeon:  Shireen Mayo MD;  Location: [facility] OR [address] [place] [address];  Service: Urology;;    INJECTION OF BOTULINUM TOXIN TYPE A  11/16/2021    Procedure: INJECTION, BOTULINUM TOXIN, 200units;  Surgeon: Shireen Mayo MD;  Location: [facility] OR [address] [place] [address];  Service: Urology;;    INJECTION OF BOTULINUM TOXIN TYPE A  7/19/2022    Procedure: INJECTION, BOTULINUM TOXIN, 300 units ;  Surgeon: Shireen Mayo MD;  Location: [facility] OR [address] [place] [address];  Service: Urology;;    INSERTION, SUPRAPUBIC CATHETER N/A 12/13/2022    Procedure: INSERTION, SUPRAPUBIC CATHETER;  Surgeon: Shireen Mayo MD;  Location: [facility] OR [address] [place] [address];  Service: Urology;  Laterality: N/A;  exchange    pain pump placement      SQ Dilaudid Pump managed by Dr. Hillman, Pain Management    REMOVAL OF BONE SPUR OF FOOT Bilateral 9/16/2022    Procedure: EXCISION ARTHRITIC BONE, BILATERAL FOOT;  Surgeon: Adam Mcguire [facility];  Location: [facility] OR;  Service: Podiatry;  Laterality: Bilateral;    REPLACEMENT OF BACLOFEN PUMP N/A 8/2/2023    Procedure: REPLACEMENT, BACLOFEN PUMP;  Surgeon: Smitha Condon MD;  Location: [facility] OR [address] [place] [address];  Service: Neurosurgery;  Laterality: N/A;  Anes: Gen  Blood: Type & Screen  Pos: Left Lat  Intrathecal Pump  Bed: Reg Reversed  Rad: C-arm  Pump: 40 cc, [employer]tronics    REPLACEMENT OF CATHETER N/A 10/31/2019    Procedure: REPLACEMENT, CATHETER-SUPRAPUBIC;  Surgeon: Shireen Mayo MD;  Location: [facility] OR [address] [place] [address];  Service: Urology;  Laterality: N/A;    SPINAL CORD STIMULATOR REMOVAL      before [place]    SPINE SURGERY  5-13-13    CERVICAL FUSION    TONSILLECTOMY       Family History   Problem Relation Age of Onset    Cancer Mother 55        breast    Cancer Father         esophagus,had laryngectomy    Esophageal cancer Father     Parkinsonism Maternal Grandmother     Tremor Maternal Grandmother     No Known Problems Brother     No Known Problems Brother     Heart disease Maternal Uncle     Colon cancer Maternal Uncle         Less than 60    No Known Problems  "Sister     No Known Problems Maternal Aunt     Cirrhosis Paternal Aunt         ETOH    Liver disease Paternal Aunt         ETOH    Liver disease Paternal Uncle         ETOH    Cirrhosis Paternal Uncle         ETOH    No Known Problems Maternal Grandfather     No Known Problems Paternal Grandmother     No Known Problems Paternal Grandfather     Melanoma Neg Hx     Amblyopia Neg Hx     Blindness Neg Hx     Cataracts Neg Hx     Diabetes Neg Hx     Glaucoma Neg Hx     Hypertension Neg Hx     Macular degeneration Neg Hx     Retinal detachment Neg Hx     Strabismus Neg Hx     Stroke Neg Hx     Thyroid disease Neg Hx     Celiac disease Neg Hx     Colon polyps Neg Hx     Cystic fibrosis Neg Hx     Crohn's disease Neg Hx     Inflammatory bowel disease Neg Hx     Liver cancer Neg Hx     Rectal cancer Neg Hx     Stomach cancer Neg Hx     Ulcerative colitis Neg Hx     Lymphoma Neg Hx      Social History     Tobacco Use    Smoking status: Never    Smokeless tobacco: Never   Substance Use Topics    Alcohol use: Never    Drug use: Yes     Types: Marijuana     Comment: "medical marijuana gummies"     Review of Systems   Constitutional:  Negative for chills and fever.   HENT:  Negative for sore throat.    Respiratory:  Positive for cough and shortness of breath.    Cardiovascular:  Positive for leg swelling. Negative for chest pain.   Gastrointestinal:  Negative for nausea and vomiting.   Genitourinary:  Negative for dysuria, frequency and urgency.   Musculoskeletal:  Positive for back pain and myalgias. Negative for neck pain and neck stiffness.   Skin:  Positive for color change. Negative for rash and wound.   Neurological:  Negative for syncope and weakness.   Hematological:  Does not bruise/bleed easily.   Psychiatric/Behavioral:  Negative for agitation, behavioral problems and confusion.        Physical Exam     Initial Vitals [09/09/23 1717]   BP Pulse Resp Temp SpO2   (!) 91/52 76 18 98.5 °F (36.9 °C) (!) 92 %      MAP     "   --         Physical Exam    Constitutional: No distress.   Chronically ill-appearing but nontoxic   HENT:   Head: Normocephalic and atraumatic.   Eyes: Conjunctivae are normal.   Neck: No JVD present.   Cardiovascular:  Intact distal pulses.           No murmur heard.  Pulmonary/Chest: No respiratory distress. She has rales.   Abdominal: She exhibits no distension. There is no abdominal tenderness.   Suprapubic catheter in place surrounding mild erythema consistent with dermatitis mild purulent drainage. There is no rebound.   Musculoskeletal:         General: Edema present.      Comments:   Bilateral pitting edema from feet to size.  Venous stasis changes erythema warmth without open wounds.  DP pulses palpable bilaterally. No crepitus or bullae. Tenderness to palpation of the right great toe.  No tenderness over the calcaneus     Neurological: She is alert.   Skin: Skin is warm and dry.   Psychiatric: She has a normal mood and affect.         ED Course   Procedures  Labs Reviewed   CBC W/ AUTO DIFFERENTIAL - Abnormal; Notable for the following components:       Result Value    Hemoglobin 11.6 (*)     MCH 26.5 (*)     MCHC 31.1 (*)     RDW 15.2 (*)     All other components within normal limits   COMPREHENSIVE METABOLIC PANEL - Abnormal; Notable for the following components:    Potassium 3.1 (*)     CO2 30 (*)     BUN 7 (*)     Alkaline Phosphatase 162 (*)     ALT 7 (*)     All other components within normal limits   URINALYSIS, REFLEX TO URINE CULTURE - Abnormal; Notable for the following components:    Appearance, UA Hazy (*)     Occult Blood UA 1+ (*)     Leukocytes, UA 2+ (*)     All other components within normal limits    Narrative:     Specimen Source->Urine   URINALYSIS MICROSCOPIC - Abnormal; Notable for the following components:    RBC, UA 6 (*)     WBC, UA 9 (*)     Yeast, UA Many (*)     All other components within normal limits    Narrative:     Specimen Source->Urine   LACTIC ACID, PLASMA   B-TYPE  NATRIURETIC PEPTIDE   PROCALCITONIN   TROPONIN I   LACTIC ACID, PLASMA   SARS-COV-2 RDRP GENE          Imaging Results              US Lower Extremity Veins Bilateral (Final result)  Result time 09/09/23 22:01:21      Final result by Antonio Anthony DO (09/09/23 22:01:21)                   Impression:      No evidence of deep venous thrombosis in either lower extremity.      Electronically signed by: Antonio Anthony  Date:    09/09/2023  Time:    22:01               Narrative:    EXAMINATION:  US LOWER EXTREMITY VEINS BILATERAL    CLINICAL HISTORY:  Localized edema    TECHNIQUE:  Duplex and color flow Doppler and dynamic compression was performed of the bilateral lower extremity veins was performed.    COMPARISON:  Ultrasound from 05/25/2023.    FINDINGS:  Suboptimal examination with poor visualization of the bilateral calf veins and the bilateral mid to distal femoral veins.    Right thigh veins: The common femoral, femoral, popliteal, upper greater saphenous, and deep femoral veins are patent and free of thrombus. The veins are normally compressible and have normal phasic flow and augmentation response.    Right calf veins: The visualized calf veins are patent.    Left thigh veins: The common femoral, femoral, popliteal, upper greater saphenous, and deep femoral veins are patent and free of thrombus. The veins are normally compressible and have normal phasic flow and augmentation response.    Left calf veins: The visualized calf veins are patent.    Miscellaneous: There is soft tissue edema.                                       X-Ray Lumbar Spine Ap And Lateral (Final result)  Result time 09/09/23 19:52:48      Final result by Antonio Anthony DO (09/09/23 19:52:48)                   Impression:      No definite acute fracture or dislocation.    Continued dextroconvex scoliosis of the lumbar spine.      Electronically signed by: Antonio Anthony  Date:    09/09/2023  Time:    19:52               Narrative:     EXAMINATION:  XR LUMBAR SPINE AP AND LATERAL    CLINICAL HISTORY:  back pain;    TECHNIQUE:  AP, lateral and spot images were performed of the lumbar spine.    COMPARISON:  02/23/2021.    FINDINGS:  There is postoperative change of the lower lumbar spine.  Hardware is intact.  There is dextroconvex scoliosis of the lumbar spine centered at approximately L3, unchanged from prior.  Scoliotic curvature and osteopenia somewhat limits evaluation.  There is no definite evidence of an acute fracture or dislocation.  The vertebral body heights are grossly maintained.  There is a thoracic spinal stimulator device.                                       X-Ray Chest AP Portable (Final result)  Result time 09/09/23 19:28:10      Final result by Antonio Anthony DO (09/09/23 19:28:10)                   Impression:      No acute abnormality.      Electronically signed by: Antonio Anthony  Date:    09/09/2023  Time:    19:28               Narrative:    EXAMINATION:  XR CHEST AP PORTABLE    CLINICAL HISTORY:  shortness of breath;    TECHNIQUE:  Single frontal view of the chest was performed.    COMPARISON:  08/08/2023.    FINDINGS:  The lungs are hypoexpanded and clear.  No focal opacities are seen.  The pleural spaces are clear.  The cardiac silhouette is enlarged, unchanged.  Osseous structures demonstrate degenerative changes.  Thoracic spine stimulator leads noted.  Partially visualized lumbar and cervical spine hardware.                                       X-Ray Ankle Complete Right (Final result)  Result time 09/09/23 19:01:38      Final result by Antonio Anthony DO (09/09/23 19:01:38)                   Impression:      Fracture of the great toe proximal phalanx.      Electronically signed by: Antonio Anthony  Date:    09/09/2023  Time:    19:01               Narrative:    EXAMINATION:  XR TIBIA FIBULA 2 VIEW RIGHT; XR ANKLE COMPLETE 3 VIEW RIGHT; XR FOOT COMPLETE 3 VIEW RIGHT    CLINICAL HISTORY:  right ankle  injury;    TECHNIQUE:  AP and lateral views of the right tibia and fibula were performed.    AP, oblique, lateral radiographs of the right ankle.    AP, oblique, lateral radiographs of the right foot.    COMPARISON:  None.    FINDINGS:  There is diffuse osteopenia.    Right tib/fib: No acute fracture or dislocation.  Alignment is normal.  There is soft tissue edema.  Joint spaces are preserved.    Right ankle: No acute fracture or dislocation. Alignment is normal. The ankle mortise is congruent. The talar dome is intact. Joint spaces are preserved. No effusion.  There is soft tissue edema.    Right foot: There is a mildly medially displaced transverse fracture of the great toe proximal phalangeal shaft.  There is a remote fracture of the 5th metatarsal.  The Lisfranc articulation is congruent. There is scattered mild-to-moderate joint space narrowing of the midfoot articulations with dorsal osteophytes.  There is pes planus.  There is an os peroneum.  There is soft tissue edema of the great toe.                        Final result by Antonio Anthony DO (09/09/23 19:01:38)                   Impression:      Fracture of the great toe proximal phalanx.      Electronically signed by: Antonio Anthony  Date:    09/09/2023  Time:    19:01               Narrative:    EXAMINATION:  XR TIBIA FIBULA 2 VIEW RIGHT; XR ANKLE COMPLETE 3 VIEW RIGHT; XR FOOT COMPLETE 3 VIEW RIGHT    CLINICAL HISTORY:  right ankle injury;    TECHNIQUE:  AP and lateral views of the right tibia and fibula were performed.    AP, oblique, lateral radiographs of the right ankle.    AP, oblique, lateral radiographs of the right foot.    COMPARISON:  None.    FINDINGS:  There is diffuse osteopenia.    Right tib/fib: No acute fracture or dislocation.  Alignment is normal.  There is soft tissue edema.  Joint spaces are preserved.    Right ankle: No acute fracture or dislocation. Alignment is normal. The ankle mortise is congruent. The talar dome is intact.  Joint spaces are preserved. No effusion.  There is soft tissue edema.    Right foot: There is a mildly medially displaced transverse fracture of the great toe proximal phalangeal shaft.  There is a remote fracture of the 5th metatarsal.  The Lisfranc articulation is congruent. There is scattered mild-to-moderate joint space narrowing of the midfoot articulations with dorsal osteophytes.  There is pes planus.  There is an os peroneum.  There is soft tissue edema of the great toe.                                       X-Ray Tibia Fibula 2 View Right (Final result)  Result time 09/09/23 19:01:38      Final result by Antonio Anthony DO (09/09/23 19:01:38)                   Impression:      Fracture of the great toe proximal phalanx.      Electronically signed by: Antonio Anthony  Date:    09/09/2023  Time:    19:01               Narrative:    EXAMINATION:  XR TIBIA FIBULA 2 VIEW RIGHT; XR ANKLE COMPLETE 3 VIEW RIGHT; XR FOOT COMPLETE 3 VIEW RIGHT    CLINICAL HISTORY:  right ankle injury;    TECHNIQUE:  AP and lateral views of the right tibia and fibula were performed.    AP, oblique, lateral radiographs of the right ankle.    AP, oblique, lateral radiographs of the right foot.    COMPARISON:  None.    FINDINGS:  There is diffuse osteopenia.    Right tib/fib: No acute fracture or dislocation.  Alignment is normal.  There is soft tissue edema.  Joint spaces are preserved.    Right ankle: No acute fracture or dislocation. Alignment is normal. The ankle mortise is congruent. The talar dome is intact. Joint spaces are preserved. No effusion.  There is soft tissue edema.    Right foot: There is a mildly medially displaced transverse fracture of the great toe proximal phalangeal shaft.  There is a remote fracture of the 5th metatarsal.  The Lisfranc articulation is congruent. There is scattered mild-to-moderate joint space narrowing of the midfoot articulations with dorsal osteophytes.  There is pes planus.  There is an os  peroneum.  There is soft tissue edema of the great toe.                        Final result by Antonio Anthony DO (09/09/23 19:01:38)                   Impression:      Fracture of the great toe proximal phalanx.      Electronically signed by: Antonio Anthony  Date:    09/09/2023  Time:    19:01               Narrative:    EXAMINATION:  XR TIBIA FIBULA 2 VIEW RIGHT; XR ANKLE COMPLETE 3 VIEW RIGHT; XR FOOT COMPLETE 3 VIEW RIGHT    CLINICAL HISTORY:  right ankle injury;    TECHNIQUE:  AP and lateral views of the right tibia and fibula were performed.    AP, oblique, lateral radiographs of the right ankle.    AP, oblique, lateral radiographs of the right foot.    COMPARISON:  None.    FINDINGS:  There is diffuse osteopenia.    Right tib/fib: No acute fracture or dislocation.  Alignment is normal.  There is soft tissue edema.  Joint spaces are preserved.    Right ankle: No acute fracture or dislocation. Alignment is normal. The ankle mortise is congruent. The talar dome is intact. Joint spaces are preserved. No effusion.  There is soft tissue edema.    Right foot: There is a mildly medially displaced transverse fracture of the great toe proximal phalangeal shaft.  There is a remote fracture of the 5th metatarsal.  The Lisfranc articulation is congruent. There is scattered mild-to-moderate joint space narrowing of the midfoot articulations with dorsal osteophytes.  There is pes planus.  There is an os peroneum.  There is soft tissue edema of the great toe.                                       X-Ray Foot Complete Right (Final result)  Result time 09/09/23 19:01:38      Final result by Antonio Anthony DO (09/09/23 19:01:38)                   Impression:      Fracture of the great toe proximal phalanx.      Electronically signed by: Antonio Anthony  Date:    09/09/2023  Time:    19:01               Narrative:    EXAMINATION:  XR TIBIA FIBULA 2 VIEW RIGHT; XR ANKLE COMPLETE 3 VIEW RIGHT; XR FOOT COMPLETE 3 VIEW  RIGHT    CLINICAL HISTORY:  right ankle injury;    TECHNIQUE:  AP and lateral views of the right tibia and fibula were performed.    AP, oblique, lateral radiographs of the right ankle.    AP, oblique, lateral radiographs of the right foot.    COMPARISON:  None.    FINDINGS:  There is diffuse osteopenia.    Right tib/fib: No acute fracture or dislocation.  Alignment is normal.  There is soft tissue edema.  Joint spaces are preserved.    Right ankle: No acute fracture or dislocation. Alignment is normal. The ankle mortise is congruent. The talar dome is intact. Joint spaces are preserved. No effusion.  There is soft tissue edema.    Right foot: There is a mildly medially displaced transverse fracture of the great toe proximal phalangeal shaft.  There is a remote fracture of the 5th metatarsal.  The Lisfranc articulation is congruent. There is scattered mild-to-moderate joint space narrowing of the midfoot articulations with dorsal osteophytes.  There is pes planus.  There is an os peroneum.  There is soft tissue edema of the great toe.                        Final result by Antonio Anthony DO (09/09/23 19:01:38)                   Impression:      Fracture of the great toe proximal phalanx.      Electronically signed by: Antonio Anthony  Date:    09/09/2023  Time:    19:01               Narrative:    EXAMINATION:  XR TIBIA FIBULA 2 VIEW RIGHT; XR ANKLE COMPLETE 3 VIEW RIGHT; XR FOOT COMPLETE 3 VIEW RIGHT    CLINICAL HISTORY:  right ankle injury;    TECHNIQUE:  AP and lateral views of the right tibia and fibula were performed.    AP, oblique, lateral radiographs of the right ankle.    AP, oblique, lateral radiographs of the right foot.    COMPARISON:  None.    FINDINGS:  There is diffuse osteopenia.    Right tib/fib: No acute fracture or dislocation.  Alignment is normal.  There is soft tissue edema.  Joint spaces are preserved.    Right ankle: No acute fracture or dislocation. Alignment is normal. The ankle mortise  is congruent. The talar dome is intact. Joint spaces are preserved. No effusion.  There is soft tissue edema.    Right foot: There is a mildly medially displaced transverse fracture of the great toe proximal phalangeal shaft.  There is a remote fracture of the 5th metatarsal.  The Lisfranc articulation is congruent. There is scattered mild-to-moderate joint space narrowing of the midfoot articulations with dorsal osteophytes.  There is pes planus.  There is an os peroneum.  There is soft tissue edema of the great toe.                                       Medications   sodium chloride 0.9% flush 10 mL (has no administration in time range)   naloxone 0.4 mg/mL injection 0.02 mg (has no administration in time range)   glucose chewable tablet 16 g (has no administration in time range)   glucose chewable tablet 24 g (has no administration in time range)   glucagon (human recombinant) injection 1 mg (has no administration in time range)   enoxaparin injection 40 mg (has no administration in time range)   dextrose 10% bolus 125 mL 125 mL (has no administration in time range)   dextrose 10% bolus 250 mL 250 mL (has no administration in time range)   aspirin EC tablet 81 mg (81 mg Oral Given 9/10/23 0818)   atorvastatin tablet 80 mg (80 mg Oral Given 9/10/23 0819)   FLUoxetine capsule 60 mg (60 mg Oral Given 9/10/23 0819)   furosemide tablet 80 mg (0 mg Oral Hold 9/10/23 0900)   levothyroxine tablet 150 mcg (150 mcg Oral Given 9/10/23 0538)   potassium chloride SA CR tablet 20 mEq (20 mEq Oral Given 9/10/23 0819)   tiotropium bromide 2.5 mcg/actuation inhaler 2 puff (2 puffs Inhalation Given 9/10/23 0829)   ampicillin-sulbactam (UNASYN) 1.5 g in sodium chloride 0.9 % 100 mL IVPB (MB+) (0 g Intravenous Stopped 9/10/23 0638)   HYDROMORPHONE intrathecal pain pump compound ( subcutaneous (via wearable injector) Canceled Entry 9/10/23 0115)   HYDROcodone-acetaminophen  mg per tablet 1 tablet (0 tablets Oral Return to  Cabinet 9/10/23 0814)   HYDROmorphone injection 0.5 mg (has no administration in time range)   QUEtiapine tablet 200 mg (200 mg Oral Given 9/10/23 0135)   traZODone tablet 300 mg (300 mg Oral Given 9/10/23 0134)   senna tablet 3 tablet (3 tablets Oral Given 9/10/23 0134)   fluconazole tablet 100 mg (has no administration in time range)   piperacillin-tazobactam (ZOSYN) 4.5 g in dextrose 5 % in water (D5W) 100 mL IVPB (MB+) (0 g Intravenous Stopped 9/9/23 2039)   morphine injection 2 mg (2 mg Intravenous Given 9/9/23 2016)   furosemide injection 40 mg (40 mg Intravenous Given 9/9/23 2017)   potassium chloride SA CR tablet 40 mEq (40 mEq Oral Given 9/9/23 2352)     Medical Decision Making  Patient is a 68-year-old woman with multiple medical comorbidities on home oxygen presenting with increased leg swelling and pain.  Initial blood pressure was notable for being hypotensive patient is mentating well.  On repeat it is improved.  She has findings consistent with volume overload and can not rule out cellulitis clinically.  Will plan to obtain labs septic workup ultrasound to evaluate for DVT and reassess.    Differential Diagnosis includes, but is not limited to:  Sepsis, venous stasis, volume overload, decompensated HF cellulitis dvt      Amount and/or Complexity of Data Reviewed  Labs: ordered. Decision-making details documented in ED Course.  Radiology: ordered.    Risk  Prescription drug management.               ED Course as of 09/10/23 1121   Sat Sep 09, 2023   1954 Repeat blood pressure is 134/62.  Care will be signed out to oncoming physician at shift change any labs.  Likely admission. [RN]   1959 CBC auto differential(!)  No significant abnormality.    [RN]      ED Course User Index  [RN] Linus Baker Jr., MD                    Clinical Impression:   Final diagnoses:  [R06.02] Shortness of breath  [R60.0] Lower extremity edema  [L03.119] Cellulitis of lower extremity, unspecified laterality  (Primary)  [R07.9] Chest pain        ED Disposition Condition    Observation Stable          Portions of this note were dictated using voice recognition software and may contain dictation related errors in spelling/grammar/syntax not found on text review          Linus Baker Jr., MD  09/10/23 1128       Linus Baker Jr., MD  09/10/23 1128

## 2023-09-10 NOTE — PLAN OF CARE
AAO x4. Complaints of pain in back and right foot. PRN medication given, which brought patient relief. Pt denies N/V, SOB, distress. Redness to BLE with 4+ edema. Right great toe also red. Suprapubic meadows in place, excoriated on right groin where the meadows lays. Catheter care provided. Skin protectant cream applied. Incision to RLQ also present from intrathecal pain pump surgery. Site approximated and healing well. Medications given per MAR. Vital signs stable throughout the night. Cardiac monitoring in place. Weight shifting assistance provided. Safety precautions maintained. Bed alarm set. Call bell within reach. Patient encouraged to call for assistance.

## 2023-09-10 NOTE — ASSESSMENT & PLAN NOTE
HFpEF  The patient is not in acute decompensation   She was given Iv lasix in the ED. We will hold her home lasiz tonight   Stat home lasix tomorrow  KCL 20 daily

## 2023-09-10 NOTE — PROVIDER PROGRESS NOTES - EMERGENCY DEPT.
This is an assumption of care note.     Upon shift change, the patient was transferred to me from Linus Baker Jr., MD at 8:05 PM in stable condition.     Tasha Hawley is a 68 y.o. female who  has a past medical history of Anxiety, Arthritis, Bilateral lower extremity edema, Carotid artery occlusion, Cataract, CHF (congestive heart failure), Coronary artery disease, Depression, Fever blister, Hard of hearing, Hypokalemia (01/09/2023), Hyponatremia (02/04/2022), Hypothyroid, Iron deficiency anemia, Lumbar radiculopathy, Ocular migraine, Other emphysema (05/22/2023), Renal disorder, and Sleep apnea.  Patient presented to ED with chief complaint of   Chief Complaint   Patient presents with    Leg Pain     Pt has fall on Wednesday. Went to Urgent Care on yesterday, Dx of fx right great toe and ankle. Bilateral lower extremities warm, red and swollen, greater on the right. Pt seen by wound care yesterday and told that she could not be treated by them because there is no open wound. Pt has home health treatment 2 x/week.   .  Workup currently in progress.   Patient transitioned to my care pending Labs, Imaging, and Ultimate Disposition    Update:   Patient remained stable throughout stay in department. Toe Fracture again noted, but otherwise, imaging unremarkable. Discussed case with [facility] medicine for further evaluation of LE edema with concerns for possible cellulitis.      VITALS:  Vitals:    09/09/23 2113 09/09/23 2137 09/09/23 2203 09/09/23 2232   BP: (!) 94/52 (!) 123/57 (!) 112/51 (!) 98/55   Pulse: 63 69 70 71   Resp: 18 (!) 21 (!) 25 (!) 23   Temp:       SpO2: 100% 100% 100% 100%   Weight:             IMAGING:  Imaging Results              US Lower Extremity Veins Bilateral (Final result)  Result time 09/09/23 22:01:21      Final result by Antonio Anthony DO (09/09/23 22:01:21)                   Impression:      No evidence of deep venous thrombosis in either lower extremity.      Electronically  signed by: Antonio Anthony  Date:    09/09/2023  Time:    22:01               Narrative:    EXAMINATION:  US LOWER EXTREMITY VEINS BILATERAL    CLINICAL HISTORY:  Localized edema    TECHNIQUE:  Duplex and color flow Doppler and dynamic compression was performed of the bilateral lower extremity veins was performed.    COMPARISON:  Ultrasound from 05/25/2023.    FINDINGS:  Suboptimal examination with poor visualization of the bilateral calf veins and the bilateral mid to distal femoral veins.    Right thigh veins: The common femoral, femoral, popliteal, upper greater saphenous, and deep femoral veins are patent and free of thrombus. The veins are normally compressible and have normal phasic flow and augmentation response.    Right calf veins: The visualized calf veins are patent.    Left thigh veins: The common femoral, femoral, popliteal, upper greater saphenous, and deep femoral veins are patent and free of thrombus. The veins are normally compressible and have normal phasic flow and augmentation response.    Left calf veins: The visualized calf veins are patent.    Miscellaneous: There is soft tissue edema.                                       X-Ray Lumbar Spine Ap And Lateral (Final result)  Result time 09/09/23 19:52:48      Final result by Antonio Anthony DO (09/09/23 19:52:48)                   Impression:      No definite acute fracture or dislocation.    Continued dextroconvex scoliosis of the lumbar spine.      Electronically signed by: Antonio Anthony  Date:    09/09/2023  Time:    19:52               Narrative:    EXAMINATION:  XR LUMBAR SPINE AP AND LATERAL    CLINICAL HISTORY:  back pain;    TECHNIQUE:  AP, lateral and spot images were performed of the lumbar spine.    COMPARISON:  02/23/2021.    FINDINGS:  There is postoperative change of the lower lumbar spine.  Hardware is intact.  There is dextroconvex scoliosis of the lumbar spine centered at approximately L3, unchanged from prior.  Scoliotic  curvature and osteopenia somewhat limits evaluation.  There is no definite evidence of an acute fracture or dislocation.  The vertebral body heights are grossly maintained.  There is a thoracic spinal stimulator device.                                       X-Ray Chest AP Portable (Final result)  Result time 09/09/23 19:28:10      Final result by Antonio Anthony DO (09/09/23 19:28:10)                   Impression:      No acute abnormality.      Electronically signed by: Antonio Anthony  Date:    09/09/2023  Time:    19:28               Narrative:    EXAMINATION:  XR CHEST AP PORTABLE    CLINICAL HISTORY:  shortness of breath;    TECHNIQUE:  Single frontal view of the chest was performed.    COMPARISON:  08/08/2023.    FINDINGS:  The lungs are hypoexpanded and clear.  No focal opacities are seen.  The pleural spaces are clear.  The cardiac silhouette is enlarged, unchanged.  Osseous structures demonstrate degenerative changes.  Thoracic spine stimulator leads noted.  Partially visualized lumbar and cervical spine hardware.                                       X-Ray Ankle Complete Right (Final result)  Result time 09/09/23 19:01:38      Final result by Antonio Anthony DO (09/09/23 19:01:38)                   Impression:      Fracture of the great toe proximal phalanx.      Electronically signed by: Antonio Anthony  Date:    09/09/2023  Time:    19:01               Narrative:    EXAMINATION:  XR TIBIA FIBULA 2 VIEW RIGHT; XR ANKLE COMPLETE 3 VIEW RIGHT; XR FOOT COMPLETE 3 VIEW RIGHT    CLINICAL HISTORY:  right ankle injury;    TECHNIQUE:  AP and lateral views of the right tibia and fibula were performed.    AP, oblique, lateral radiographs of the right ankle.    AP, oblique, lateral radiographs of the right foot.    COMPARISON:  None.    FINDINGS:  There is diffuse osteopenia.    Right tib/fib: No acute fracture or dislocation.  Alignment is normal.  There is soft tissue edema.  Joint spaces are preserved.    Right  ankle: No acute fracture or dislocation. Alignment is normal. The ankle mortise is congruent. The talar dome is intact. Joint spaces are preserved. No effusion.  There is soft tissue edema.    Right foot: There is a mildly medially displaced transverse fracture of the great toe proximal phalangeal shaft.  There is a remote fracture of the 5th metatarsal.  The Lisfranc articulation is congruent. There is scattered mild-to-moderate joint space narrowing of the midfoot articulations with dorsal osteophytes.  There is pes planus.  There is an os peroneum.  There is soft tissue edema of the great toe.                        Final result by Antonio Anthony DO (09/09/23 19:01:38)                   Impression:      Fracture of the great toe proximal phalanx.      Electronically signed by: Antonio Anthony  Date:    09/09/2023  Time:    19:01               Narrative:    EXAMINATION:  XR TIBIA FIBULA 2 VIEW RIGHT; XR ANKLE COMPLETE 3 VIEW RIGHT; XR FOOT COMPLETE 3 VIEW RIGHT    CLINICAL HISTORY:  right ankle injury;    TECHNIQUE:  AP and lateral views of the right tibia and fibula were performed.    AP, oblique, lateral radiographs of the right ankle.    AP, oblique, lateral radiographs of the right foot.    COMPARISON:  None.    FINDINGS:  There is diffuse osteopenia.    Right tib/fib: No acute fracture or dislocation.  Alignment is normal.  There is soft tissue edema.  Joint spaces are preserved.    Right ankle: No acute fracture or dislocation. Alignment is normal. The ankle mortise is congruent. The talar dome is intact. Joint spaces are preserved. No effusion.  There is soft tissue edema.    Right foot: There is a mildly medially displaced transverse fracture of the great toe proximal phalangeal shaft.  There is a remote fracture of the 5th metatarsal.  The Lisfranc articulation is congruent. There is scattered mild-to-moderate joint space narrowing of the midfoot articulations with dorsal osteophytes.  There is pes  planus.  There is an os peroneum.  There is soft tissue edema of the great toe.                                       X-Ray Tibia Fibula 2 View Right (Final result)  Result time 09/09/23 19:01:38      Final result by Antonio Anthony DO (09/09/23 19:01:38)                   Impression:      Fracture of the great toe proximal phalanx.      Electronically signed by: Antonio Anthony  Date:    09/09/2023  Time:    19:01               Narrative:    EXAMINATION:  XR TIBIA FIBULA 2 VIEW RIGHT; XR ANKLE COMPLETE 3 VIEW RIGHT; XR FOOT COMPLETE 3 VIEW RIGHT    CLINICAL HISTORY:  right ankle injury;    TECHNIQUE:  AP and lateral views of the right tibia and fibula were performed.    AP, oblique, lateral radiographs of the right ankle.    AP, oblique, lateral radiographs of the right foot.    COMPARISON:  None.    FINDINGS:  There is diffuse osteopenia.    Right tib/fib: No acute fracture or dislocation.  Alignment is normal.  There is soft tissue edema.  Joint spaces are preserved.    Right ankle: No acute fracture or dislocation. Alignment is normal. The ankle mortise is congruent. The talar dome is intact. Joint spaces are preserved. No effusion.  There is soft tissue edema.    Right foot: There is a mildly medially displaced transverse fracture of the great toe proximal phalangeal shaft.  There is a remote fracture of the 5th metatarsal.  The Lisfranc articulation is congruent. There is scattered mild-to-moderate joint space narrowing of the midfoot articulations with dorsal osteophytes.  There is pes planus.  There is an os peroneum.  There is soft tissue edema of the great toe.                        Final result by Antonio Anthony DO (09/09/23 19:01:38)                   Impression:      Fracture of the great toe proximal phalanx.      Electronically signed by: Antonio Anthony  Date:    09/09/2023  Time:    19:01               Narrative:    EXAMINATION:  XR TIBIA FIBULA 2 VIEW RIGHT; XR ANKLE COMPLETE 3 VIEW RIGHT; XR  FOOT COMPLETE 3 VIEW RIGHT    CLINICAL HISTORY:  right ankle injury;    TECHNIQUE:  AP and lateral views of the right tibia and fibula were performed.    AP, oblique, lateral radiographs of the right ankle.    AP, oblique, lateral radiographs of the right foot.    COMPARISON:  None.    FINDINGS:  There is diffuse osteopenia.    Right tib/fib: No acute fracture or dislocation.  Alignment is normal.  There is soft tissue edema.  Joint spaces are preserved.    Right ankle: No acute fracture or dislocation. Alignment is normal. The ankle mortise is congruent. The talar dome is intact. Joint spaces are preserved. No effusion.  There is soft tissue edema.    Right foot: There is a mildly medially displaced transverse fracture of the great toe proximal phalangeal shaft.  There is a remote fracture of the 5th metatarsal.  The Lisfranc articulation is congruent. There is scattered mild-to-moderate joint space narrowing of the midfoot articulations with dorsal osteophytes.  There is pes planus.  There is an os peroneum.  There is soft tissue edema of the great toe.                                       X-Ray Foot Complete Right (Final result)  Result time 09/09/23 19:01:38      Final result by Antonio Anthony DO (09/09/23 19:01:38)                   Impression:      Fracture of the great toe proximal phalanx.      Electronically signed by: Antonio Anthony  Date:    09/09/2023  Time:    19:01               Narrative:    EXAMINATION:  XR TIBIA FIBULA 2 VIEW RIGHT; XR ANKLE COMPLETE 3 VIEW RIGHT; XR FOOT COMPLETE 3 VIEW RIGHT    CLINICAL HISTORY:  right ankle injury;    TECHNIQUE:  AP and lateral views of the right tibia and fibula were performed.    AP, oblique, lateral radiographs of the right ankle.    AP, oblique, lateral radiographs of the right foot.    COMPARISON:  None.    FINDINGS:  There is diffuse osteopenia.    Right tib/fib: No acute fracture or dislocation.  Alignment is normal.  There is soft tissue edema.  Joint  spaces are preserved.    Right ankle: No acute fracture or dislocation. Alignment is normal. The ankle mortise is congruent. The talar dome is intact. Joint spaces are preserved. No effusion.  There is soft tissue edema.    Right foot: There is a mildly medially displaced transverse fracture of the great toe proximal phalangeal shaft.  There is a remote fracture of the 5th metatarsal.  The Lisfranc articulation is congruent. There is scattered mild-to-moderate joint space narrowing of the midfoot articulations with dorsal osteophytes.  There is pes planus.  There is an os peroneum.  There is soft tissue edema of the great toe.                        Final result by Antonio Anthony DO (09/09/23 19:01:38)                   Impression:      Fracture of the great toe proximal phalanx.      Electronically signed by: Antonio Anthony  Date:    09/09/2023  Time:    19:01               Narrative:    EXAMINATION:  XR TIBIA FIBULA 2 VIEW RIGHT; XR ANKLE COMPLETE 3 VIEW RIGHT; XR FOOT COMPLETE 3 VIEW RIGHT    CLINICAL HISTORY:  right ankle injury;    TECHNIQUE:  AP and lateral views of the right tibia and fibula were performed.    AP, oblique, lateral radiographs of the right ankle.    AP, oblique, lateral radiographs of the right foot.    COMPARISON:  None.    FINDINGS:  There is diffuse osteopenia.    Right tib/fib: No acute fracture or dislocation.  Alignment is normal.  There is soft tissue edema.  Joint spaces are preserved.    Right ankle: No acute fracture or dislocation. Alignment is normal. The ankle mortise is congruent. The talar dome is intact. Joint spaces are preserved. No effusion.  There is soft tissue edema.    Right foot: There is a mildly medially displaced transverse fracture of the great toe proximal phalangeal shaft.  There is a remote fracture of the 5th metatarsal.  The Lisfranc articulation is congruent. There is scattered mild-to-moderate joint space narrowing of the midfoot articulations with dorsal  osteophytes.  There is pes planus.  There is an os peroneum.  There is soft tissue edema of the great toe.                                        LABS:  Labs Reviewed   CBC W/ AUTO DIFFERENTIAL - Abnormal; Notable for the following components:       Result Value    Hemoglobin 11.6 (*)     MCH 26.5 (*)     MCHC 31.1 (*)     RDW 15.2 (*)     All other components within normal limits   COMPREHENSIVE METABOLIC PANEL - Abnormal; Notable for the following components:    Potassium 3.1 (*)     CO2 30 (*)     BUN 7 (*)     Alkaline Phosphatase 162 (*)     ALT 7 (*)     All other components within normal limits   URINALYSIS, REFLEX TO URINE CULTURE - Abnormal; Notable for the following components:    Appearance, UA Hazy (*)     Occult Blood UA 1+ (*)     Leukocytes, UA 2+ (*)     All other components within normal limits    Narrative:     Specimen Source->Urine   URINALYSIS MICROSCOPIC - Abnormal; Notable for the following components:    RBC, UA 6 (*)     WBC, UA 9 (*)     Yeast, UA Many (*)     All other components within normal limits    Narrative:     Specimen Source->Urine   CULTURE, BLOOD   CULTURE, BLOOD   LACTIC ACID, PLASMA   B-TYPE NATRIURETIC PEPTIDE   PROCALCITONIN   TROPONIN I   LACTIC ACID, PLASMA   SARS-COV-2 RDRP GENE         MEDICATIONS:  Medications   piperacillin-tazobactam (ZOSYN) 4.5 g in dextrose 5 % in water (D5W) 100 mL IVPB (MB+) (0 g Intravenous Stopped 9/9/23 2039)   morphine injection 2 mg (2 mg Intravenous Given 9/9/23 2016)   furosemide injection 40 mg (40 mg Intravenous Given 9/9/23 2017)         IMPRESSION:  1. Cellulitis of lower extremity, unspecified laterality    2. Shortness of breath    3. Lower extremity edema             DISPOSITION:   ED Disposition Condition    Observation Stable

## 2023-09-10 NOTE — PLAN OF CARE
Problem: Adult Inpatient Plan of Care  Goal: Plan of Care Review  Outcome: Ongoing, Progressing     VIRTUAL NURSE:  Cued into patient's room.  Permission received per patient to turn camera to view patient; lights off and unable to view patient.  Introduced as VN for night shift that will be working with floor nurse and nursing assistant.  Educated patient on VN's role in patient care and  [facility] model.  Plan of care reviewed with patient.  Education per flowsheet.   Informed patient that staff will round on them every 2 hours but to use call light for any other needs they may have; informed of fall risk and fall precautions.  Patient verbalized understanding.  Call light within reach; bed siderails up x3.  Opportunity given for questions and questions answered.  Admission assessment questions answered.  Instructed to call for assistance.  Will cont to monitor and intervene as needed.    Labs, notes, orders, and careplan initiated.       09/10/23 0144   Patient Request   Patient Requested hard of hearing; unable to see patient d/t lights off. c/o chronic back pain 10/10- recently received pain med   Nurse Notification   Bedside Nurse Notified? Yes   Name of Bedside Nurse YOON Rodriguez   Nurse Notfication Method Secure Chat   Nurse Notified Of Patient Request   Admission   Initial VN Admission Questions Complete   Communication Issues? Patient Hearing   Shift   Virtual Nurse - Rounding Complete   Pain Management Interventions pain management plan reviewed with patient/caregiver   Virtual Nurse - Patient Verbalized Approval Of Camera Use;VN Rounding   Type of Frequent Check   Type Patient Rounds   Safety/Activity   Safety Promotion/Fall Prevention assistive device/personal item within reach;diversional activities provided;Fall Risk reviewed with patient/family;high risk medications identified;medications reviewed;nonskid shoes/socks when out of bed;room near unit station;side rails raised x 3;supervised  activity;Supervised toileting - stay within arms reach;instructed to call staff for mobility   Pain/Comfort/Sleep   Preferred Pain Scale number (Numeric Rating Pain Scale)   Comfort/Acceptable Pain Level 7   Pain Body Location back   Pain Rating (0-10): Rest 10   POSS (Pasero Opioid-Induced Sed Scale) 2 - Slightly drowsy, easily aroused

## 2023-09-10 NOTE — PLAN OF CARE
Pt is AAOx4 with periodic episodes of lethargy and sleep. Pt reports pain of a 10/10 but has BP's ranging in the 80s/50s throughout the day. Antibx administered as ordered.

## 2023-09-10 NOTE — PROGRESS NOTES
[facility] Medicine  Progress Note    Patient Name: Tasha Hawley  MRN: [id number]  Patient Class: OP- Observation   Admission Date: 9/9/2023  Length of Stay: 0 days  Attending Physician: Justyna Hinton*  Primary Care Provider: Mesfin Hodges II, MD        Subjective:     Principal Problem:Cellulitis        HPI:  68 year old woman with PMH of HFpEF, CAD, chronic back pain s/p multiple surgeries and now with intrathecal pump, derepression, chronic bilateral venous insufficiency. The patient amublates with a walker and she presented after a machanical fall 2 days ago and her xray was remarkable for fracture of the great toe proximal phalanx. She presented today due to worsening pain and redness in both of her lower ext.       Overview/Hospital Course:  No notes on file    Interval History: awake, no new complaint.     Replace potassium   Lower extremity erythema-continue Unasyn  Ortho consult pending    Review of Systems   Constitutional:  Positive for activity change. Negative for chills and fever.   Respiratory:  Positive for shortness of breath. Negative for apnea and chest tightness.    Genitourinary:  Negative for dyspareunia.   Skin:  Positive for color change.   Neurological:  Negative for dizziness, syncope and weakness.   Psychiatric/Behavioral:  Negative for agitation and confusion.      Objective:     Vital Signs (Most Recent):  Temp: 97.4 °F (36.3 °C) (09/10/23 0717)  Pulse: (!) 58 (09/10/23 0829)  Resp: 16 (09/10/23 0829)  BP: (!) 98/55 (09/10/23 0717)  SpO2: 96 % (09/10/23 0829) Vital Signs (24h Range):  Temp:  [97.4 °F (36.3 °C)-98.5 °F (36.9 °C)] 97.4 °F (36.3 °C)  Pulse:  [58-76] 58  Resp:  [14-30] 16  SpO2:  [92 %-100 %] 96 %  BP: ()/(51-62) 98/55     Weight: 100.7 kg (222 lb 0.1 oz)  Body mass index is 36.94 kg/m².    Intake/Output Summary (Last 24 hours) at 9/10/2023 0943  Last data filed at 9/10/2023 0639  Gross per 24 hour   Intake 170.05 ml   Output 3200 ml  "  Net -3029.95 ml         Physical Exam  Constitutional:       Appearance: Normal appearance.   HENT:      Head: Normocephalic.   Cardiovascular:      Rate and Rhythm: Normal rate and regular rhythm.   Pulmonary:      Effort: Pulmonary effort is normal.   Abdominal:      General: Abdomen is flat. Bowel sounds are normal.      Palpations: Abdomen is soft.   Musculoskeletal:         General: Swelling, tenderness, deformity and signs of injury present.      Cervical back: Normal range of motion.      Right lower leg: Edema present.      Left lower leg: Edema present.   Skin:     Findings: Erythema present.   Neurological:      Mental Status: She is alert.             Significant Labs: A1C:   Recent Labs   Lab 07/09/23  0347   HGBA1C 5.2     ABGs: No results for input(s): "PH", "PCO2", "HCO3", "POCSATURATED", "BE", "TOTALHB", "COHB", "METHB", "O2HB", "POCFIO2", "PO2" in the last 48 hours.  CBC:   Recent Labs   Lab 09/09/23 1945   WBC 7.11   HGB 11.6*   HCT 37.3        CMP:   Recent Labs   Lab 09/09/23 1945      K 3.1*   CL 97   CO2 30*      BUN 7*   CREATININE 0.9   CALCIUM 9.5   PROT 7.5   ALBUMIN 3.7   BILITOT 0.5   ALKPHOS 162*   AST 12   ALT 7*   ANIONGAP 13     Lactic Acid:   Recent Labs   Lab 09/09/23 1945 09/09/23  2323   LACTATE 1.4 1.2     Lipase: No results for input(s): "LIPASE" in the last 48 hours.  Lipid Panel: No results for input(s): "CHOL", "HDL", "LDLCALC", "TRIG", "CHOLHDL" in the last 48 hours.  Magnesium: No results for input(s): "MG" in the last 48 hours.  Troponin:   Recent Labs   Lab 09/09/23 1945   TROPONINI <0.006     TSH:   Recent Labs   Lab 07/03/23  0826   TSH <0.015*     Urine Culture: No results for input(s): "LABURIN" in the last 48 hours.  Urine Studies:   Recent Labs   Lab 09/09/23 2013   COLORU Yellow   APPEARANCEUA Hazy*   PHUR 7.0   SPECGRAV 1.005   PROTEINUA Negative   GLUCUA Negative   KETONESU Negative   BILIRUBINUA Negative   OCCULTUA 1+*   NITRITE " Negative   UROBILINOGEN Negative   LEUKOCYTESUR 2+*   RBCUA 6*   WBCUA 9*   BACTERIA Rare   HYALINECASTS 1       Significant Imaging: I have reviewed all pertinent imaging results/findings within the past 24 hours.      Assessment/Plan:      * Cellulitis    Non purulent cellulitis in both lower ext   Start Unasyn 1.5 mg q6hr- continue  Can switch to Augmentin upon discharge      Fracture, phalanx, foot  Ortho consult       Hypokalemia  Replace      UTI (urinary tract infection)  Current UTI   UA- positive- with yeast  Diflucan       Recurrent UTI  UA positive with yeast      Bradycardia  Chronic  monitor      Hypothyroidism (acquired)  Pending TSH  Cont home levothyroxine       Congestive heart failure  HFpEF  The patient is not in acute decompensation   She was given Iv lasix in the ED. We will hold her home lasix tonight   Stat home lasix - continue  KCL 20 daily       VTE Risk Mitigation (From admission, onward)         Ordered     enoxaparin injection 40 mg  Daily         09/09/23 2305     IP VTE HIGH RISK PATIENT  Once         09/09/23 2305     Place sequential compression device  Until discontinued         09/09/23 2305                Discharge Planning   JACKIE:      Code Status: Full Code   Is the patient medically ready for discharge?:     Reason for patient still in hospital (select all that apply): Patient trending condition               Justyna Hinton MD  Department of Hospital Medicine   [facility] Surg

## 2023-09-10 NOTE — HPI
68 year old woman with PMH of HFpEF, CAD, chronic back pain s/p multiple surgeries and now with intrathecal pump, derepression, chronic bilateral venous insufficiency. The patient amublates with a walker and she presented after a machanical fall 2 days ago and her xray was remarkable for fracture of the great toe proximal phalanx. She presented today due to worsening pain and redness in both of her lower ext.

## 2023-09-10 NOTE — H&P
[facility] Emergency Dept  [facility] Medicine  History & Physical    Patient Name: Tasha Hawley  MRN: [id number]  Patient Class: OP- Observation  Admission Date: 9/9/2023  Attending Physician: Sandra Pace MD  Primary Care Provider: Mesfin Hodges II, MD         Patient information was obtained from patient and ER records.     Subjective:     Principal Problem:Cellulitis    Chief Complaint:   Chief Complaint   Patient presents with    Leg Pain     Pt has fall on Wednesday. Went to Urgent Care on yesterday, Dx of fx right great toe and ankle. Bilateral lower extremities warm, red and swollen, greater on the right. Pt seen by wound care yesterday and told that she could not be treated by them because there is no open wound. Pt has home health treatment 2 x/week.        HPI: 68 year old woman with PMH of HFpEF, CAD, chronic back pain s/p multiple surgeries and now with intrathecal pump, derepression, chronic bilateral venous insufficiency. The patient amublates with a walker and she presented after a machanical fall 2 days ago and her xray was remarkable for fracture of the great toe proximal phalanx. She presented today due to worsening pain and redness in both of her lower ext.       Past Medical History:   Diagnosis Date    Anxiety     Arthritis     Bilateral lower extremity edema     severe chronic    Carotid artery occlusion     Cataract     CHF (congestive heart failure)     Coronary artery disease     subtotalled LAD with collateral    Depression     Fever blister     Hard of hearing     Hypokalemia 01/09/2023    Hyponatremia 02/04/2022    Hypothyroid     Iron deficiency anemia     Lumbar radiculopathy     with chronic pain    Ocular migraine     Other emphysema 05/22/2023    Renal disorder     Sleep apnea     cpap       Past Surgical History:   Procedure Laterality Date    ADENOIDECTOMY      BACK SURGERY      x 12    CARDIAC CATHETERIZATION  2016    subtotalled LAD with right  to left collaterals    CATARACT EXTRACTION W/  INTRAOCULAR LENS IMPLANT Left     Dr Coleman     CYSTOSCOPIC LITHOLAPAXY N/A 6/27/2019    Procedure: CYSTOLITHOLAPAXY;  Surgeon: Shireen Mayo MD;  Location: [facility] OR 2ND FLR;  Service: Urology;  Laterality: N/A;    CYSTOSCOPIC LITHOLAPAXY N/A 9/3/2019    Procedure: CYSTOLITHOLAPAXY;  Surgeon: Shireen Mayo MD;  Location: [facility] OR 2ND FLR;  Service: Urology;  Laterality: N/A;    CYSTOSCOPY N/A 7/13/2021    Procedure: CYSTOSCOPY;  Surgeon: Shireen Mayo MD;  Location: [facility] OR 1ST FLR;  Service: Urology;  Laterality: N/A;    CYSTOSCOPY  11/16/2021    Procedure: CYSTOSCOPY;  Surgeon: Shireen Mayo MD;  Location: [facility] OR 1ST FLR;  Service: Urology;;    CYSTOSCOPY  7/19/2022    Procedure: CYSTOSCOPY;  Surgeon: Shireen Mayo MD;  Location: [facility] OR 1ST FLR;  Service: Urology;;    CYSTOSCOPY WITH INJECTION OF PERIURETHRAL BULKING AGENT  7/19/2022    Procedure: CYSTOSCOPY, WITH PERIURETHRAL BULKING AGENT INJECTION-MACROPLASTIQUE;  Surgeon: Shireen Maoy MD;  Location: [facility] OR 1ST FLR;  Service: Urology;;    CYSTOSCOPY WITH INJECTION OF PERIURETHRAL BULKING AGENT N/A 3/28/2023    Procedure: CYSTOSCOPY, WITH PERIURETHRAL BULKING AGENT INJECTION;  Surgeon: Shireen Mayo MD;  Location: [facility] OR 1ST FLR;  Service: Urology;  Laterality: N/A;  [place]    CYSTOSCOPY,WITH BOTULINUM TOXIN INJECTION N/A 12/13/2022    Procedure: CYSTOSCOPY,WITH BOTULINUM TOXIN INJECTION;  Surgeon: Shireen Mayo MD;  Location: [facility] OR 1ST FLR;  Service: Urology;  Laterality: N/A;  300 U    CYSTOSCOPY,WITH BOTULINUM TOXIN INJECTION N/A 3/28/2023    Procedure: CYSTOSCOPY,WITH BOTULINUM TOXIN INJECTION;  Surgeon: Shireen Mayo MD;  Location: [facility] OR 1ST FLR;  Service: Urology;  Laterality: N/A;  45 MIN.    300 UNITS    ESOPHAGOGASTRODUODENOSCOPY N/A 5/23/2018    Procedure: ESOPHAGOGASTRODUODENOSCOPY (EGD);  Surgeon: Prince Vance MD;  Location: [address]  FLR);  Service: Endoscopy;  Laterality: N/A;  r/s 'd per Dr. Vance due to family emergency- ER    ESOPHAGOGASTRODUODENOSCOPY N/A 6/23/2023    Procedure: EGD (ESOPHAGOGASTRODUODENOSCOPY);  Surgeon: Juaquin Barry MD;  Location: [facility] (2ND FLR);  Service: Endoscopy;  Laterality: N/A;  Refferal: ROSEANNE VANCE  Order  tele enc 5/18/23  dx: history of a Nissen fundoplication  Additional Scheduling Information:  On home oxygen 2nd floor for airway protection also with a pain pump elevated BMI close to 40   Prep Gastroparesis   ins    HYSTERECTOMY  1975    endometriosis    INJECTION OF BOTULINUM TOXIN TYPE A  7/13/2021    Procedure: INJECTION, BOTULINUM TOXIN, 200units;  Surgeon: Shireen Mayo MD;  [facility];  Service: Urology;;    INJECTION OF BOTULINUM TOXIN TYPE A  11/16/2021    Procedure: INJECTION, BOTULINUM TOXIN, 200units;  Surgeon: Shireen Mayo MD;  [address]  Service: Urology;;    INJECTION OF BOTULINUM TOXIN TYPE A  7/19/2022    Procedure: INJECTION, BOTULINUM TOXIN, 300 units ;  Surgeon: Shireen Mayo MD;  [facility];  Service: Urology;;    INSERTION, SUPRAPUBIC CATHETER N/A 12/13/2022    Procedure: INSERTION, SUPRAPUBIC CATHETER;  Surgeon: Shireen Mayo MD;  [address]  Service: Urology;  Laterality: N/A;  exchange    pain pump placement      SQ Dilaudid Pump managed by Dr. Hillman, Pain Management    REMOVAL OF BONE SPUR OF FOOT Bilateral 9/16/2022    Procedure: EXCISION ARTHRITIC BONE, BILATERAL FOOT;  Surgeon: NICOLA Mcgrath;  Location[facility];  Service: Podiatry;  Laterality: Bilateral;    REPLACEMENT OF BACLOFEN PUMP N/A 8/2/2023    Procedure: REPLACEMENT, BACLOFEN PUMP;  Surgeon: Smitha Condon MD;  [facility];  Service: Neurosurgery;  Laterality: N/A;  Anes: Gen  Blood: Type & Screen  Pos: Left Lat  Intrathecal Pump  Bed: Reg Reversed  Rad: C-arm  Pump: 40 cc, medtronics     REPLACEMENT OF CATHETER N/A 10/31/2019    Procedure: REPLACEMENT, CATHETER-SUPRAPUBIC;  Surgeon: Shireen Mayo MD;  Location: [facility] OR [address] [place] [address];  Service: Urology;  Laterality: N/A;    SPINAL CORD STIMULATOR REMOVAL      before [place]    SPINE SURGERY  5-13-13    CERVICAL FUSION    TONSILLECTOMY         Review of patient's allergies indicates:   Allergen Reactions    (d)-limonene flavor      Other reaction(s): difficult intubation  Other reaction(s): Difficulty breathing    Bactrim [sulfamethoxazole-trimethoprim] Anaphylaxis    Benadryl [diphenhydramine hcl] Shortness Of Breath    Fentanyl Itching, Nausea And Vomiting and Swelling             Imitrex [sumatriptan succinate] Shortness Of Breath    Percocet [oxycodone-acetaminophen] Shortness Of Breath    Topamax [topiramate] Shortness Of Breath    Vancomycin Anaphylaxis     Rash    Butorphanol tartrate     Darvocet a500 [propoxyphene n-acetaminophen]      Other reaction(s): Difficulty breathing    Evening primrose (oenothera biennis)     Lyrica [pregabalin] Other (See Comments)     tremors    White petrolatum-zinc     Zinc oxide-white petrolatum      Other reaction(s): Difficulty breathing    Latex, natural rubber Itching and Rash    Phenytoin Rash and Other (See Comments)     Trouble breathing       No current facility-administered medications on file prior to encounter.     Current Outpatient Medications on File Prior to Encounter   Medication Sig    aspirin (ECOTRIN) 81 MG EC tablet Take 1 tablet (81 mg total) by mouth once daily.    atorvastatin (LIPITOR) 80 MG tablet Take 1 tablet (80 mg total) by mouth once daily.    butalbital-acetaminophen-caffeine -40 mg (FIORICET, ESGIC) -40 mg per tablet Take 1 tablet by mouth daily as needed.    cyanocobalamin, vitamin B-12, 5,000 mcg Subl Place 1 tablet under the tongue once daily.    darifenacin (ENABLEX) 7.5 MG Tb24 Take 7.5 mg by mouth.    docusate sodium (COLACE) 100 MG  capsule Take 200 mg by mouth every evening.    ferrous gluconate (FERGON) 324 MG tablet Take 1 tablet (324 mg total) by mouth daily with breakfast.    fludrocortisone (FLORINEF) 0.1 mg Tab Take a half-tablet (50 mcg) by mouth once daily    FLUoxetine 20 MG capsule Take 3 capsules (60 mg total) by mouth once daily.    fluticasone propionate (FLONASE) 50 mcg/actuation nasal spray 2 sprays (100 mcg total) by Each Nostril route once daily.    furosemide (LASIX) 40 MG tablet Take 2 tablets (80 mg total) by mouth 2 (two) times a day.    HYDROcodone-acetaminophen (NORCO)  mg per tablet Take 1 tablet by mouth every 6 (six) hours as needed for breakthrough pain.    hydrocortisone (CORTEF) 10 MG Tab Take 1 tablet (10 mg total) by mouth every evening.    hydrOXYzine (ATARAX) 50 MG tablet Take 1 tablet (50 mg total) by mouth every 4 (four) hours as needed for Anxiety.    intrathecal pain pump compound Hydromorphone (7.5 mg/mL) infusion at 8.59 mg/day (0.3578 mg/hr) out of a total reservoir volume of 40 mL  Pump filled monthly    ketorolac (TORADOL) 10 mg tablet Take 10 mg by mouth daily as needed.    ketorolac (TORADOL) 30 mg/mL (1 mL) injection Inject 30 mg into the vein once.    levothyroxine (SYNTHROID) 150 MCG tablet Take 1 tablet (150 mcg total) by mouth before breakfast.    LIDOcaine (LIDODERM) 5 % Place 1 patch onto the skin once daily. Remove & Discard patch within 12 hours or as directed by MD    liothyronine (CYTOMEL) 25 MCG Tab Take 1 tablet (25 mcg total) by mouth once daily.    loperamide HCl (IMODIUM A-D ORAL) Take by mouth as needed. Diarrhea    nitroGLYCERIN (NITROSTAT) 0.4 MG SL tablet Place 0.4 mg under the tongue every 5 (five) minutes as needed for Chest pain.    nystatin (MYCOSTATIN) powder Apply topically 4 (four) times daily.    ondansetron (ZOFRAN-ODT) 4 MG TbDL Take 4 mg by mouth every 4 to 6 hours as needed.    pantoprazole (PROTONIX) 40 MG tablet Take 1 tablet (40 mg total) by  "mouth once daily.    potassium chloride (MICRO-K) 10 MEQ CpSR Take 2 capsules (20 mEq total) by mouth once daily.    promethazine (PHENERGAN) 25 MG tablet Take 25 mg by mouth every 6 (six) hours as needed for Nausea.    QUEtiapine (SEROQUEL) 100 MG Tab TAKE 2 TABLETS (200 MG) BY MOUTH NIGHTLY (Patient taking differently: Take 200 mg by mouth nightly.)    senna (SENNA LAX) 8.6 mg tablet Take 2 tablets by mouth 2 (two) times daily.    tiotropium bromide (SPIRIVA RESPIMAT) 2.5 mcg/actuation inhaler Inhale 2 puffs into the lungs once daily. Controller    tiZANidine (ZANAFLEX) 4 MG tablet Take 4 mg by mouth 2 (two) times daily as needed.    traMADoL (ULTRAM) 50 mg tablet     traZODone (DESYREL) 300 MG tablet Take 1 tablet (300 mg total) by mouth every evening.     Family History       Problem Relation (Age of Onset)    Cancer Mother (55), Father    Cirrhosis Paternal Aunt, Paternal Uncle    Colon cancer Maternal Uncle    Esophageal cancer Father    Heart disease Maternal Uncle    Liver disease Paternal Aunt, Paternal Uncle    No Known Problems Brother, Brother, Sister, Maternal Aunt, Maternal Grandfather, Paternal Grandmother, Paternal Grandfather    Parkinsonism Maternal Grandmother    Tremor Maternal Grandmother          Tobacco Use    Smoking status: Never    Smokeless tobacco: Never   Substance and Sexual Activity    Alcohol use: Never    Drug use: Yes     Types: Marijuana     Comment: "medical marijuana gummies"    Sexual activity: Not on file     Review of Systems   Constitutional: Negative.    HENT: Negative.     Eyes: Negative.    Respiratory:  Positive for shortness of breath.    Gastrointestinal: Negative.    Endocrine: Negative.    Genitourinary: Negative.    Musculoskeletal: Negative.    Skin:  Positive for color change.     Objective:     Vital Signs (Most Recent):  Temp: 98.5 °F (36.9 °C) (09/09/23 1717)  Pulse: 71 (09/09/23 2232)  Resp: (!) 23 (09/09/23 2232)  BP: (!) 98/55 (09/09/23 " 2232)  SpO2: 100 % (09/09/23 2232) Vital Signs (24h Range):  Temp:  [98.5 °F (36.9 °C)] 98.5 °F (36.9 °C)  Pulse:  [63-76] 71  Resp:  [16-30] 23  SpO2:  [92 %-100 %] 100 %  BP: ()/(51-62) 98/55     Weight: 100.7 kg (222 lb)  Body mass index is 36.94 kg/m².     Physical Exam  Constitutional:       Appearance: Normal appearance.   HENT:      Head: Normocephalic.      Mouth/Throat:      Mouth: Mucous membranes are moist.   Cardiovascular:      Rate and Rhythm: Normal rate and regular rhythm.   Pulmonary:      Effort: Pulmonary effort is normal.   Abdominal:      General: Abdomen is flat. Bowel sounds are normal.      Palpations: Abdomen is soft.   Musculoskeletal:         General: Swelling, tenderness, deformity and signs of injury present.      Cervical back: Normal range of motion.      Right lower leg: Edema present.      Left lower leg: Edema present.   Skin:     Findings: Erythema present.   Neurological:      Mental Status: She is alert.                Significant Labs: All pertinent labs within the past 24 hours have been reviewed.    Significant Imaging: I have reviewed all pertinent imaging results/findings within the past 24 hours.    Assessment/Plan:     * Cellulitis    Non purulent cellulitis in both lower ext   Start Unasyn 1.5 mg q6hr  Can switch to Augmentin upon discharge      Fracture, phalanx, foot  Ortho consult       Hypothyroidism (acquired)  Pending TSH  Cont home levothyroxine       Congestive heart failure  HFpEF  The patient is not in acute decompensation   She was given Iv lasix in the ED. We will hold her home lasiz tonight   Stat home lasix tomorrow  KCL 20 daily       VTE Risk Mitigation (From admission, onward)         Ordered     enoxaparin injection 40 mg  Daily         09/09/23 2305     IP VTE HIGH RISK PATIENT  Once         09/09/23 2305     Place sequential compression device  Until discontinued         09/09/23 2305                   On 09/09/2023, patient should be placed in  hospital observation services under my care.        Sandra Pace MD  Department of Hospital Medicine  [facility] - Emergency Dept

## 2023-09-10 NOTE — ASSESSMENT & PLAN NOTE
Non purulent cellulitis in both lower ext   Start Unasyn 1.5 mg q6hr  Can switch to Augmentin upon discharge

## 2023-09-10 NOTE — SUBJECTIVE & OBJECTIVE
Past Medical History:   Diagnosis Date    Anxiety     Arthritis     Bilateral lower extremity edema     severe chronic    Carotid artery occlusion     Cataract     CHF (congestive heart failure)     Coronary artery disease     subtotalled LAD with collateral    Depression     Fever blister     Hard of hearing     Hypokalemia 01/09/2023    Hyponatremia 02/04/2022    Hypothyroid     Iron deficiency anemia     Lumbar radiculopathy     with chronic pain    Ocular migraine     Other emphysema 05/22/2023    Renal disorder     Sleep apnea     cpap       Past Surgical History:   Procedure Laterality Date    ADENOIDECTOMY      BACK SURGERY      x 12    CARDIAC CATHETERIZATION  2016    subtotalled LAD with right to left collaterals    CATARACT EXTRACTION W/  INTRAOCULAR LENS IMPLANT Left     Dr Coleman     CYSTOSCOPIC LITHOLAPAXY N/A 6/27/2019    Procedure: CYSTOLITHOLAPAXY;  Surgeon: Shireen Mayo MD;  Location: [facility] OR 2ND FLR;  Service: Urology;  Laterality: N/A;    CYSTOSCOPIC LITHOLAPAXY N/A 9/3/2019    Procedure: CYSTOLITHOLAPAXY;  Surgeon: Shireen Mayo MD;  Location: [facility] OR 2ND FLR;  Service: Urology;  Laterality: N/A;    CYSTOSCOPY N/A 7/13/2021    Procedure: CYSTOSCOPY;  Surgeon: Shireen Mayo MD;  Location: [facility] OR 1ST FLR;  Service: Urology;  Laterality: N/A;    CYSTOSCOPY  11/16/2021    Procedure: CYSTOSCOPY;  Surgeon: Shireen Mayo MD;  Location: [facility] OR 1ST FLR;  Service: Urology;;    CYSTOSCOPY  7/19/2022    Procedure: CYSTOSCOPY;  Surgeon: Shireen Mayo MD;  Location: [facility] OR 1ST FLR;  Service: Urology;;    CYSTOSCOPY WITH INJECTION OF PERIURETHRAL BULKING AGENT  7/19/2022    Procedure: CYSTOSCOPY, WITH PERIURETHRAL BULKING AGENT INJECTION-MACROPLASTIQUE;  Surgeon: Shireen Mayo MD;  Location: [facility] OR 1ST FLR;  Service: Urology;;    CYSTOSCOPY WITH INJECTION OF PERIURETHRAL BULKING AGENT N/A 3/28/2023    Procedure: CYSTOSCOPY, WITH PERIURETHRAL BULKING AGENT INJECTION;  Surgeon:  Shireen Mayo MD;  [facility];  Service: Urology;  Laterality: N/A;  [place]    CYSTOSCOPY,WITH BOTULINUM TOXIN INJECTION N/A 12/13/2022    Procedure: CYSTOSCOPY,WITH BOTULINUM TOXIN INJECTION;  Surgeon: Shireen Mayo MD;  [facility];  Service: Urology;  Laterality: N/A;  300 U    CYSTOSCOPY,WITH BOTULINUM TOXIN INJECTION N/A 3/28/2023    Procedure: CYSTOSCOPY,WITH BOTULINUM TOXIN INJECTION;  Surgeon: Shireen Mayo MD;  [facility];  Service: Urology;  Laterality: N/A;  45 MIN.    300 UNITS    ESOPHAGOGASTRODUODENOSCOPY N/A 5/23/2018    Procedure: ESOPHAGOGASTRODUODENOSCOPY (EGD);  Surgeon: Prince Vance MD;  Location: [facility] ENDO (4TH FLR);  Service: Endoscopy;  Laterality: N/A;  r/s 'd per Dr. Vance due to family emergency- ER    ESOPHAGOGASTRODUODENOSCOPY N/A 6/23/2023    Procedure: EGD (ESOPHAGOGASTRODUODENOSCOPY);  Surgeon: Juaquin Barry MD;  Location: [facility] ENDO (2ND FLR);  Service: Endoscopy;  Laterality: N/A;  Refferal: PRINCE VANCE  Order  tele enc 5/18/23  dx: history of a Nissen fundoplication  Additional Scheduling Information:  On home oxygen 2nd floor for airway protection also with a pain pump elevated BMI close to 40   Prep Gastroparesis   ins    HYSTERECTOMY  1975    endometriosis    INJECTION OF BOTULINUM TOXIN TYPE A  7/13/2021    Procedure: INJECTION, BOTULINUM TOXIN, 200units;  Surgeon: Shireen Mayo MD;  [facility];  Service: Urology;;    INJECTION OF BOTULINUM TOXIN TYPE A  11/16/2021    Procedure: INJECTION, BOTULINUM TOXIN, 200units;  Surgeon: Shireen Mayo MD;  [facility];  Service: Urology;;    INJECTION OF BOTULINUM TOXIN TYPE A  7/19/2022    Procedure: INJECTION, BOTULINUM TOXIN, 300 units ;  Surgeon: Shireen Mayo MD;  [facility];  Service: Urology;;    INSERTION, SUPRAPUBIC CATHETER N/A 12/13/2022    Procedure: INSERTION, SUPRAPUBIC CATHETER;  Surgeon: Shireen  NIEVES Mayo MD;  Location: [facility] OR 1ST FLR;  Service: Urology;  Laterality: N/A;  exchange    pain pump placement      SQ Dilaudid Pump managed by Dr. Hillman, Pain Management    REMOVAL OF BONE SPUR OF FOOT Bilateral 9/16/2022    Procedure: EXCISION ARTHRITIC BONE, BILATERAL FOOT;  Surgeon: Adam Mcguire DPM;  Location: [facility];  Service: Podiatry;  Laterality: Bilateral;    REPLACEMENT OF BACLOFEN PUMP N/A 8/2/2023    Procedure: REPLACEMENT, BACLOFEN PUMP;  Surgeon: Smitha Condon MD;  Location: [facility] OR 2ND FLR;  Service: Neurosurgery;  Laterality: N/A;  Anes: Gen  Blood: Type & Screen  Pos: Left Lat  Intrathecal Pump  Bed: Reg Reversed  Rad: C-arm  Pump: 40 cc, medtronics    REPLACEMENT OF CATHETER N/A 10/31/2019    Procedure: REPLACEMENT, CATHETER-SUPRAPUBIC;  Surgeon: Shireen Mayo MD;  Location: [facility] OR 1ST FLR;  Service: Urology;  Laterality: N/A;    SPINAL CORD STIMULATOR REMOVAL      before Larissa    SPINE SURGERY  5-13-13    CERVICAL FUSION    TONSILLECTOMY         Review of patient's allergies indicates:   Allergen Reactions    (d)-limonene flavor      Other reaction(s): difficult intubation  Other reaction(s): Difficulty breathing    Bactrim [sulfamethoxazole-trimethoprim] Anaphylaxis    Benadryl [diphenhydramine hcl] Shortness Of Breath    Fentanyl Itching, Nausea And Vomiting and Swelling             Imitrex [sumatriptan succinate] Shortness Of Breath    Percocet [oxycodone-acetaminophen] Shortness Of Breath    Topamax [topiramate] Shortness Of Breath    Vancomycin Anaphylaxis     Rash    Butorphanol tartrate     Darvocet a500 [propoxyphene n-acetaminophen]      Other reaction(s): Difficulty breathing    Evening primrose (oenothera biennis)     Lyrica [pregabalin] Other (See Comments)     tremors    White petrolatum-zinc     Zinc oxide-white petrolatum      Other reaction(s): Difficulty breathing    Latex, natural rubber Itching and Rash    Phenytoin Rash and Other (See Comments)      Trouble breathing       No current facility-administered medications on file prior to encounter.     Current Outpatient Medications on File Prior to Encounter   Medication Sig    aspirin (ECOTRIN) 81 MG EC tablet Take 1 tablet (81 mg total) by mouth once daily.    atorvastatin (LIPITOR) 80 MG tablet Take 1 tablet (80 mg total) by mouth once daily.    butalbital-acetaminophen-caffeine -40 mg (FIORICET, ESGIC) -40 mg per tablet Take 1 tablet by mouth daily as needed.    cyanocobalamin, vitamin B-12, 5,000 mcg Subl Place 1 tablet under the tongue once daily.    darifenacin (ENABLEX) 7.5 MG Tb24 Take 7.5 mg by mouth.    docusate sodium (COLACE) 100 MG capsule Take 200 mg by mouth every evening.    ferrous gluconate (FERGON) 324 MG tablet Take 1 tablet (324 mg total) by mouth daily with breakfast.    fludrocortisone (FLORINEF) 0.1 mg Tab Take a half-tablet (50 mcg) by mouth once daily    FLUoxetine 20 MG capsule Take 3 capsules (60 mg total) by mouth once daily.    fluticasone propionate (FLONASE) 50 mcg/actuation nasal spray 2 sprays (100 mcg total) by Each Nostril route once daily.    furosemide (LASIX) 40 MG tablet Take 2 tablets (80 mg total) by mouth 2 (two) times a day.    HYDROcodone-acetaminophen (NORCO)  mg per tablet Take 1 tablet by mouth every 6 (six) hours as needed for breakthrough pain.    hydrocortisone (CORTEF) 10 MG Tab Take 1 tablet (10 mg total) by mouth every evening.    hydrOXYzine (ATARAX) 50 MG tablet Take 1 tablet (50 mg total) by mouth every 4 (four) hours as needed for Anxiety.    intrathecal pain pump compound Hydromorphone (7.5 mg/mL) infusion at 8.59 mg/day (0.3578 mg/hr) out of a total reservoir volume of 40 mL  Pump filled monthly    ketorolac (TORADOL) 10 mg tablet Take 10 mg by mouth daily as needed.    ketorolac (TORADOL) 30 mg/mL (1 mL) injection Inject 30 mg into the vein once.    levothyroxine (SYNTHROID) 150 MCG tablet Take 1 tablet (150 mcg total) by mouth before  breakfast.    LIDOcaine (LIDODERM) 5 % Place 1 patch onto the skin once daily. Remove & Discard patch within 12 hours or as directed by MD    liothyronine (CYTOMEL) 25 MCG Tab Take 1 tablet (25 mcg total) by mouth once daily.    loperamide HCl (IMODIUM A-D ORAL) Take by mouth as needed. Diarrhea    nitroGLYCERIN (NITROSTAT) 0.4 MG SL tablet Place 0.4 mg under the tongue every 5 (five) minutes as needed for Chest pain.    nystatin (MYCOSTATIN) powder Apply topically 4 (four) times daily.    ondansetron (ZOFRAN-ODT) 4 MG TbDL Take 4 mg by mouth every 4 to 6 hours as needed.    pantoprazole (PROTONIX) 40 MG tablet Take 1 tablet (40 mg total) by mouth once daily.    potassium chloride (MICRO-K) 10 MEQ CpSR Take 2 capsules (20 mEq total) by mouth once daily.    promethazine (PHENERGAN) 25 MG tablet Take 25 mg by mouth every 6 (six) hours as needed for Nausea.    QUEtiapine (SEROQUEL) 100 MG Tab TAKE 2 TABLETS (200 MG) BY MOUTH NIGHTLY (Patient taking differently: Take 200 mg by mouth nightly.)    senna (SENNA LAX) 8.6 mg tablet Take 2 tablets by mouth 2 (two) times daily.    tiotropium bromide (SPIRIVA RESPIMAT) 2.5 mcg/actuation inhaler Inhale 2 puffs into the lungs once daily. Controller    tiZANidine (ZANAFLEX) 4 MG tablet Take 4 mg by mouth 2 (two) times daily as needed.    traMADoL (ULTRAM) 50 mg tablet     traZODone (DESYREL) 300 MG tablet Take 1 tablet (300 mg total) by mouth every evening.     Family History       Problem Relation (Age of Onset)    Cancer Mother (55), Father    Cirrhosis Paternal Aunt, Paternal Uncle    Colon cancer Maternal Uncle    Esophageal cancer Father    Heart disease Maternal Uncle    Liver disease Paternal Aunt, Paternal Uncle    No Known Problems Brother, Brother, Sister, Maternal Aunt, Maternal Grandfather, Paternal Grandmother, Paternal Grandfather    Parkinsonism Maternal Grandmother    Tremor Maternal Grandmother          Tobacco Use    Smoking status: Never    Smokeless tobacco:  "Never   Substance and Sexual Activity    Alcohol use: Never    Drug use: Yes     Types: Marijuana     Comment: "medical marijuana gummies"    Sexual activity: Not on file     Review of Systems   Constitutional: Negative.    HENT: Negative.     Eyes: Negative.    Respiratory:  Positive for shortness of breath.    Gastrointestinal: Negative.    Endocrine: Negative.    Genitourinary: Negative.    Musculoskeletal: Negative.    Skin:  Positive for color change.     Objective:     Vital Signs (Most Recent):  Temp: 98.5 °F (36.9 °C) (09/09/23 1717)  Pulse: 71 (09/09/23 2232)  Resp: (!) 23 (09/09/23 2232)  BP: (!) 98/55 (09/09/23 2232)  SpO2: 100 % (09/09/23 2232) Vital Signs (24h Range):  Temp:  [98.5 °F (36.9 °C)] 98.5 °F (36.9 °C)  Pulse:  [63-76] 71  Resp:  [16-30] 23  SpO2:  [92 %-100 %] 100 %  BP: ()/(51-62) 98/55     Weight: 100.7 kg (222 lb)  Body mass index is 36.94 kg/m².     Physical Exam  Constitutional:       Appearance: Normal appearance.   HENT:      Head: Normocephalic.      Mouth/Throat:      Mouth: Mucous membranes are moist.   Cardiovascular:      Rate and Rhythm: Normal rate and regular rhythm.   Pulmonary:      Effort: Pulmonary effort is normal.   Abdominal:      General: Abdomen is flat. Bowel sounds are normal.      Palpations: Abdomen is soft.   Musculoskeletal:         General: Swelling, tenderness, deformity and signs of injury present.      Cervical back: Normal range of motion.      Right lower leg: Edema present.      Left lower leg: Edema present.   Skin:     Findings: Erythema present.   Neurological:      Mental Status: She is alert.                Significant Labs: All pertinent labs within the past 24 hours have been reviewed.    Significant Imaging: I have reviewed all pertinent imaging results/findings within the past 24 hours.  "

## 2023-09-10 NOTE — ASSESSMENT & PLAN NOTE
HFpEF  The patient is not in acute decompensation   She was given Iv lasix in the ED. We will hold her home lasix tonight   Stat home lasix - continue  KCL 20 daily

## 2023-09-10 NOTE — CODE/ RAPID DOCUMENTATION
RAPID RESPONSE NURSE PROACTIVE ROUNDING NOTE       Time of Visit: 1330    Admit Date: 2023  LOS: 0  Code Status: Full Code   Date of Visit: 09/10/2023  : 1955  Age: 68 y.o.  Sex: female  Race: White  Bed: K505/K505 A:   MRN: [id number]  Was the patient discharged from an ICU this admission? No   Was the patient discharged from a PACU within last 24 hours? No   Did the patient receive conscious sedation/general anesthesia in last 24 hours? No   Was the patient in the ED within the past 24 hours? Yes   Was the patient on NIPPV within the past 24 hours? No   Attending Physician: Justyna Hinton  Primary Service: Hospitalist,Hospice and Palliative Medicine   Time spent at the bedside: < 15 min    SITUATION    Notified by bedside RN via phone call  Reason for alert: PIV    Diagnosis: Cellulitis   has a past medical history of Anxiety, Arthritis, Bilateral lower extremity edema, Carotid artery occlusion, Cataract, CHF (congestive heart failure), Coronary artery disease, Depression, Fever blister, Hard of hearing, Hypokalemia, Hyponatremia, Hypothyroid, Iron deficiency anemia, Lumbar radiculopathy, Ocular migraine, Other emphysema, Renal disorder, and Sleep apnea.    Last Vitals:  Temp: 97.9 °F (36.6 °C) (09/10 1220)  Pulse: 60 (09/10 1220)  Resp: 18 (09/10 1220)  BP: 86/45 (09/10 1220)  SpO2: 99 % (09/10 1220)    24 Hour Vitals Range:  Temp:  [97.4 °F (36.3 °C)-98.5 °F (36.9 °C)]   Pulse:  [58-76]   Resp:  [14-30]   BP: ()/(45-62)   SpO2:  [92 %-100 %]     Clinical Issues: Circulatory    ASSESSMENT/INTERVENTIONS    L FA 20g placed via US    FOLLOW UP    Call back the Rapid Response NurseBryant at [phone] for additional questions or concerns.

## 2023-09-10 NOTE — ASSESSMENT & PLAN NOTE
Chronic  monitor     Pt in with lower back pain radiating down left leg , history of s1 herniation

## 2023-09-11 LAB
ANION GAP SERPL CALC-SCNC: 9 MMOL/L (ref 8–16)
BASOPHILS # BLD AUTO: 0.01 K/UL (ref 0–0.2)
BASOPHILS NFR BLD: 0.2 % (ref 0–1.9)
BUN SERPL-MCNC: 5 MG/DL (ref 8–23)
CALCIUM SERPL-MCNC: 8.5 MG/DL (ref 8.7–10.5)
CHLORIDE SERPL-SCNC: 102 MMOL/L (ref 95–110)
CO2 SERPL-SCNC: 34 MMOL/L (ref 23–29)
CREAT SERPL-MCNC: 0.8 MG/DL (ref 0.5–1.4)
DIFFERENTIAL METHOD: ABNORMAL
EOSINOPHIL # BLD AUTO: 0.2 K/UL (ref 0–0.5)
EOSINOPHIL NFR BLD: 4.1 % (ref 0–8)
ERYTHROCYTE [DISTWIDTH] IN BLOOD BY AUTOMATED COUNT: 15.6 % (ref 11.5–14.5)
EST. GFR  (NO RACE VARIABLE): >60 ML/MIN/1.73 M^2
GLUCOSE SERPL-MCNC: 112 MG/DL (ref 70–110)
HCT VFR BLD AUTO: 32 % (ref 37–48.5)
HGB BLD-MCNC: 9.6 G/DL (ref 12–16)
IMM GRANULOCYTES # BLD AUTO: 0.01 K/UL (ref 0–0.04)
IMM GRANULOCYTES NFR BLD AUTO: 0.2 % (ref 0–0.5)
LYMPHOCYTES # BLD AUTO: 1.4 K/UL (ref 1–4.8)
LYMPHOCYTES NFR BLD: 25.1 % (ref 18–48)
MCH RBC QN AUTO: 26.3 PG (ref 27–31)
MCHC RBC AUTO-ENTMCNC: 30 G/DL (ref 32–36)
MCV RBC AUTO: 88 FL (ref 82–98)
MONOCYTES # BLD AUTO: 0.6 K/UL (ref 0.3–1)
MONOCYTES NFR BLD: 10.3 % (ref 4–15)
NEUTROPHILS # BLD AUTO: 3.4 K/UL (ref 1.8–7.7)
NEUTROPHILS NFR BLD: 60.1 % (ref 38–73)
NRBC BLD-RTO: 0 /100 WBC
PLATELET # BLD AUTO: 207 K/UL (ref 150–450)
PMV BLD AUTO: 9.6 FL (ref 9.2–12.9)
POTASSIUM SERPL-SCNC: 3.5 MMOL/L (ref 3.5–5.1)
RBC # BLD AUTO: 3.65 M/UL (ref 4–5.4)
SODIUM SERPL-SCNC: 145 MMOL/L (ref 136–145)
WBC # BLD AUTO: 5.61 K/UL (ref 3.9–12.7)

## 2023-09-11 PROCEDURE — 25000003 PHARM REV CODE 250: Performed by: STUDENT IN AN ORGANIZED HEALTH CARE EDUCATION/TRAINING PROGRAM

## 2023-09-11 PROCEDURE — 25000003 PHARM REV CODE 250: Performed by: FAMILY MEDICINE

## 2023-09-11 PROCEDURE — 63600175 PHARM REV CODE 636 W HCPCS: Performed by: STUDENT IN AN ORGANIZED HEALTH CARE EDUCATION/TRAINING PROGRAM

## 2023-09-11 PROCEDURE — 36415 COLL VENOUS BLD VENIPUNCTURE: CPT | Performed by: FAMILY MEDICINE

## 2023-09-11 PROCEDURE — G0378 HOSPITAL OBSERVATION PER HR: HCPCS

## 2023-09-11 PROCEDURE — 85025 COMPLETE CBC W/AUTO DIFF WBC: CPT | Performed by: FAMILY MEDICINE

## 2023-09-11 PROCEDURE — 99900035 HC TECH TIME PER 15 MIN (STAT)

## 2023-09-11 PROCEDURE — 96376 TX/PRO/DX INJ SAME DRUG ADON: CPT

## 2023-09-11 PROCEDURE — 27000221 HC OXYGEN, UP TO 24 HOURS

## 2023-09-11 PROCEDURE — 94761 N-INVAS EAR/PLS OXIMETRY MLT: CPT

## 2023-09-11 PROCEDURE — 80048 BASIC METABOLIC PNL TOTAL CA: CPT | Performed by: FAMILY MEDICINE

## 2023-09-11 PROCEDURE — 63700000 PHARM REV CODE 250 ALT 637 W/O HCPCS: Performed by: FAMILY MEDICINE

## 2023-09-11 PROCEDURE — 96366 THER/PROPH/DIAG IV INF ADDON: CPT

## 2023-09-11 PROCEDURE — 96375 TX/PRO/DX INJ NEW DRUG ADDON: CPT

## 2023-09-11 PROCEDURE — 94640 AIRWAY INHALATION TREATMENT: CPT | Mod: XB

## 2023-09-11 PROCEDURE — 96372 THER/PROPH/DIAG INJ SC/IM: CPT | Performed by: STUDENT IN AN ORGANIZED HEALTH CARE EDUCATION/TRAINING PROGRAM

## 2023-09-11 PROCEDURE — 96365 THER/PROPH/DIAG IV INF INIT: CPT | Mod: 59

## 2023-09-11 PROCEDURE — 63600175 PHARM REV CODE 636 W HCPCS: Performed by: FAMILY MEDICINE

## 2023-09-11 RX ORDER — FUROSEMIDE 10 MG/ML
40 INJECTION INTRAMUSCULAR; INTRAVENOUS ONCE
Status: COMPLETED | OUTPATIENT
Start: 2023-09-11 | End: 2023-09-11

## 2023-09-11 RX ORDER — FUROSEMIDE 10 MG/ML
80 INJECTION INTRAMUSCULAR; INTRAVENOUS
Status: DISCONTINUED | OUTPATIENT
Start: 2023-09-11 | End: 2023-09-13

## 2023-09-11 RX ADMIN — ATORVASTATIN CALCIUM 80 MG: 40 TABLET, FILM COATED ORAL at 08:09

## 2023-09-11 RX ADMIN — AMPICILLIN SODIUM AND SULBACTAM SODIUM 3 G: 2; 1 INJECTION, POWDER, FOR SOLUTION INTRAMUSCULAR; INTRAVENOUS at 11:09

## 2023-09-11 RX ADMIN — AMPICILLIN AND SULBACTAM 1.5 G: .5; 1 INJECTION, POWDER, FOR SOLUTION INTRAMUSCULAR; INTRAVENOUS at 06:09

## 2023-09-11 RX ADMIN — FUROSEMIDE 40 MG: 10 INJECTION, SOLUTION INTRAMUSCULAR; INTRAVENOUS at 03:09

## 2023-09-11 RX ADMIN — FUROSEMIDE 80 MG: 10 INJECTION, SOLUTION INTRAMUSCULAR; INTRAVENOUS at 08:09

## 2023-09-11 RX ADMIN — FLUCONAZOLE 100 MG: 100 TABLET ORAL at 08:09

## 2023-09-11 RX ADMIN — FLUOXETINE 60 MG: 20 CAPSULE ORAL at 08:09

## 2023-09-11 RX ADMIN — LEVOTHYROXINE SODIUM 150 MCG: 75 TABLET ORAL at 06:09

## 2023-09-11 RX ADMIN — TIOTROPIUM BROMIDE INHALATION SPRAY 2 PUFF: 3.12 SPRAY, METERED RESPIRATORY (INHALATION) at 08:09

## 2023-09-11 RX ADMIN — QUETIAPINE FUMARATE 200 MG: 100 TABLET ORAL at 11:09

## 2023-09-11 RX ADMIN — MICONAZOLE NITRATE: 20 OINTMENT TOPICAL at 08:09

## 2023-09-11 RX ADMIN — AMPICILLIN SODIUM AND SULBACTAM SODIUM 3 G: 2; 1 INJECTION, POWDER, FOR SOLUTION INTRAMUSCULAR; INTRAVENOUS at 01:09

## 2023-09-11 RX ADMIN — FUROSEMIDE 80 MG: 40 TABLET ORAL at 08:09

## 2023-09-11 RX ADMIN — TRAZODONE HYDROCHLORIDE 300 MG: 100 TABLET ORAL at 11:09

## 2023-09-11 RX ADMIN — HYDROCODONE BITARTRATE AND ACETAMINOPHEN 1 TABLET: 10; 325 TABLET ORAL at 02:09

## 2023-09-11 RX ADMIN — AMPICILLIN AND SULBACTAM 1.5 G: .5; 1 INJECTION, POWDER, FOR SOLUTION INTRAMUSCULAR; INTRAVENOUS at 01:09

## 2023-09-11 RX ADMIN — MICONAZOLE NITRATE 1 ML: 20 OINTMENT TOPICAL at 10:09

## 2023-09-11 RX ADMIN — HYDROMORPHONE HYDROCHLORIDE 0.5 MG: 1 INJECTION, SOLUTION INTRAMUSCULAR; INTRAVENOUS; SUBCUTANEOUS at 09:09

## 2023-09-11 RX ADMIN — HYDROMORPHONE HYDROCHLORIDE 0.5 MG: 1 INJECTION, SOLUTION INTRAMUSCULAR; INTRAVENOUS; SUBCUTANEOUS at 06:09

## 2023-09-11 RX ADMIN — AMPICILLIN SODIUM AND SULBACTAM SODIUM 3 G: 2; 1 INJECTION, POWDER, FOR SOLUTION INTRAMUSCULAR; INTRAVENOUS at 05:09

## 2023-09-11 RX ADMIN — POTASSIUM CHLORIDE 20 MEQ: 1500 TABLET, EXTENDED RELEASE ORAL at 08:09

## 2023-09-11 RX ADMIN — ASPIRIN 81 MG: 81 TABLET, COATED ORAL at 08:09

## 2023-09-11 RX ADMIN — ENOXAPARIN SODIUM 40 MG: 40 INJECTION SUBCUTANEOUS at 05:09

## 2023-09-11 NOTE — ASSESSMENT & PLAN NOTE
Right ankle x-ray :1. Nondisplaced fracture of the right great toe proximal phalanx.  2. Lucency through the posterior aspect of the right calcaneus, may represent a nondisplaced fracture, correlate with point tenderness.  Right foot x-ray: Fracture of the great toe proximal phalanx.  Right tibia/fibula x-ray: Fracture of the great toe proximal phalanx.  Ortho consult -pending evaluation

## 2023-09-11 NOTE — PROGRESS NOTES
[facility]  Progress Note    Patient Name: Tasha Hawley  MRN: [id number]  Patient Class: OP- Observation   Admission Date: 9/9/2023  Length of Stay: 0 days  Attending Physician: Justyna Hinton*  Primary Care Provider: Mesfin Hodges II, MD        Subjective:     Principal Problem:Cellulitis        HPI:  68 year old woman with PMH of HFpEF, CAD, chronic back pain s/p multiple surgeries and now with intrathecal pump, derepression, chronic bilateral venous insufficiency. The patient amublates with a walker and she presented after a machanical fall 2 days ago and her xray was remarkable for fracture of the great toe proximal phalanx. She presented today due to worsening pain and redness in both of her lower ext.       Overview/Hospital Course:  No notes on file    Interval History: awake, alert, reported patient complained of dyspnea this morning.   CXR- Patchy opacities in both lungs, progressed since the prior study.  Enlarging small pleural effusions- Give 1 dose of extra lasix and switch home regimen to IV     Blood culture NGTD- continue Unasyn  Lower extremity erythema improving -continue Unasyn  Ortho consult pending    Review of Systems   Constitutional:  Positive for activity change. Negative for chills and fever.   Respiratory:  Positive for shortness of breath. Negative for apnea and chest tightness.    Genitourinary:  Negative for dyspareunia.   Skin:  Positive for color change.   Neurological:  Negative for dizziness, syncope and weakness.   Psychiatric/Behavioral:  Negative for agitation and confusion.      Objective:     Vital Signs (Most Recent):  Temp: 98.1 °F (36.7 °C) (09/11/23 1154)  Pulse: 80 (09/11/23 1206)  Resp: 18 (09/11/23 1154)  BP: 125/61 (09/11/23 1154)  SpO2: 99 % (09/11/23 1154) Vital Signs (24h Range):  Temp:  [97.4 °F (36.3 °C)-98.8 °F (37.1 °C)] 98.1 °F (36.7 °C)  Pulse:  [53-80] 80  Resp:  [18-24] 18  SpO2:  [95 %-100 %] 99 %  BP:  "()/(52-61) 125/61     Weight: 100.7 kg (222 lb 0.1 oz)  Body mass index is 36.94 kg/m².    Intake/Output Summary (Last 24 hours) at 9/11/2023 1454  Last data filed at 9/11/2023 1200  Gross per 24 hour   Intake 1206.48 ml   Output 4350 ml   Net -3143.52 ml           Physical Exam  Constitutional:       Appearance: Normal appearance.   HENT:      Head: Normocephalic.   Cardiovascular:      Rate and Rhythm: Normal rate and regular rhythm.   Pulmonary:      Effort: Pulmonary effort is normal.   Abdominal:      General: Abdomen is flat. Bowel sounds are normal.      Palpations: Abdomen is soft.   Musculoskeletal:         General: Swelling, tenderness, deformity and signs of injury present.      Cervical back: Normal range of motion.      Right lower leg: Edema present.      Left lower leg: Edema present.   Skin:     Findings: Erythema present.   Neurological:      Mental Status: She is alert.             Significant Labs: A1C:   Recent Labs   Lab 07/09/23  0347   HGBA1C 5.2       ABGs: No results for input(s): "PH", "PCO2", "HCO3", "POCSATURATED", "BE", "TOTALHB", "COHB", "METHB", "O2HB", "POCFIO2", "PO2" in the last 48 hours.  CBC:   Recent Labs   Lab 09/09/23 1945 09/11/23  0448   WBC 7.11 5.61   HGB 11.6* 9.6*   HCT 37.3 32.0*    207       CMP:   Recent Labs   Lab 09/09/23 1945 09/11/23  0448    145   K 3.1* 3.5   CL 97 102   CO2 30* 34*    112*   BUN 7* 5*   CREATININE 0.9 0.8   CALCIUM 9.5 8.5*   PROT 7.5  --    ALBUMIN 3.7  --    BILITOT 0.5  --    ALKPHOS 162*  --    AST 12  --    ALT 7*  --    ANIONGAP 13 9       Lactic Acid:   Recent Labs   Lab 09/09/23 1945 09/09/23  2323   LACTATE 1.4 1.2       Lipase: No results for input(s): "LIPASE" in the last 48 hours.  Lipid Panel: No results for input(s): "CHOL", "HDL", "LDLCALC", "TRIG", "CHOLHDL" in the last 48 hours.  Magnesium: No results for input(s): "MG" in the last 48 hours.  Troponin:   Recent Labs   Lab 09/09/23 1945   TROPONINI " "<0.006       TSH:   Recent Labs   Lab 07/03/23  0826   TSH <0.015*       Urine Culture: No results for input(s): "LABURIN" in the last 48 hours.  Urine Studies:   Recent Labs   Lab 09/09/23 2013   COLORU Yellow   APPEARANCEUA Hazy*   PHUR 7.0   SPECGRAV 1.005   PROTEINUA Negative   GLUCUA Negative   KETONESU Negative   BILIRUBINUA Negative   OCCULTUA 1+*   NITRITE Negative   UROBILINOGEN Negative   LEUKOCYTESUR 2+*   RBCUA 6*   WBCUA 9*   BACTERIA Rare   HYALINECASTS 1         Significant Imaging: I have reviewed all pertinent imaging results/findings within the past 24 hours.      Assessment/Plan:      * Cellulitis    Non purulent cellulitis in both lower ext   Start Unasyn 1.5 mg q6hr- continue  Can switch to Augmentin upon discharge      Fracture, phalanx, foot  Right ankle x-ray :1. Nondisplaced fracture of the right great toe proximal phalanx.  2. Lucency through the posterior aspect of the right calcaneus, may represent a nondisplaced fracture, correlate with point tenderness.  Right foot x-ray: Fracture of the great toe proximal phalanx.  Right tibia/fibula x-ray: Fracture of the great toe proximal phalanx.  Ortho consult -pending evaluation    Hypokalemia  Replace      UTI (urinary tract infection)  Current UTI   UA- positive- with yeast  Diflucan       Recurrent UTI  UA positive with yeast      Bradycardia  Chronic  monitor      Hypothyroidism (acquired)  Pending TSH  Cont home levothyroxine       Congestive heart failure  HFpEF  The patient is not in acute decompensation   She was given Iv lasix in the ED. We will hold her home lasix tonight   Stat home lasix - continue. Give extra dose on 7/11 and switch to IV  KCL 20 daily       VTE Risk Mitigation (From admission, onward)         Ordered     enoxaparin injection 40 mg  Daily         09/09/23 2305     IP VTE HIGH RISK PATIENT  Once         09/09/23 2305     Place sequential compression device  Until discontinued         09/09/23 2305          "       Discharge Planning   JACKIE:      Code Status: Full Code   Is the patient medically ready for discharge?:     Reason for patient still in hospital (select all that apply): Patient trending condition  Discharge Plan A: Home, Home with family, Home Health                  Justyna Hinton MD  Department of Hospital Medicine   [facility]

## 2023-09-11 NOTE — ASSESSMENT & PLAN NOTE
HFpEF  The patient is not in acute decompensation   She was given Iv lasix in the ED. We will hold her home lasix tonight   Stat home lasix - continue. Give extra dose on 7/11 and switch to IV  KCL 20 daily

## 2023-09-11 NOTE — NURSING
RAPID RESPONSE NURSE PROACTIVE ROUNDING NOTE         Admit Date: 2023  LOS: 0  Code Status: Full Code   Date of Visit: 2023  : 1955  Age: 68 y.o.  Sex: female  Race: White  Bed: K505/K505 A:   MRN: [id number]  Was the patient discharged from an ICU this admission? No   Was the patient discharged from a PACU within last 24 hours? No   Did the patient receive conscious sedation/general anesthesia in last 24 hours? No   Was the patient in the ED within the past 24 hours? No   Was the patient on NIPPV within the past 24 hours? No   Attending Physician: Justyna Hinton*  Primary Service: Hospitalist,Hospice and Palliative Medicine   Time spent at the bedside: < 15 min    SITUATION    Notified by  House Supervisor   Reason for alert: Grogginess and SOB    Diagnosis: Cellulitis   has a past medical history of Anxiety, Arthritis, Bilateral lower extremity edema, Carotid artery occlusion, Cataract, CHF (congestive heart failure), Coronary artery disease, Depression, Fever blister, Hard of hearing, Hypokalemia, Hyponatremia, Hypothyroid, Iron deficiency anemia, Lumbar radiculopathy, Ocular migraine, Other emphysema, Renal disorder, and Sleep apnea.    Last Vitals:  Temp: 98.1 °F (36.7 °C) ( 1154)  Pulse: 67 ( 1154)  Resp: 18 ( 1154)  BP: 125/61 ( 1154)  SpO2: 99 % (1154)    24 Hour Vitals Range:  Temp:  [97.4 °F (36.3 °C)-98.8 °F (37.1 °C)]   Pulse:  [53-72]   Resp:  [18-24]   BP: ()/(45-61)   SpO2:  [95 %-100 %]     Clinical Issues: Neuro/respiratory     ASSESSMENT/INTERVENTIONS    Called to bedside for proactive round by Angelica house supervisor for grogginess and sob. Patient resting in the bed, eating lunch, no complaints of SOB, able to hold full conversations. Patient did receive PRN pain medicine despite cont. Pain pump. Otherwise stable, no interventions needed at this time. Will remove from teams, able to intervene if needed.       RECOMMENDATIONS  -monitor  sats  -PRN breathing tx    Discussed plan of care with bedside RNMackenzie      FOLLOW UP    Call back the Rapid Response Nurse, Nathalie Phan at [phone] for additional questions or concerns.

## 2023-09-11 NOTE — PROGRESS NOTES
Pharmacist Renal Dose Adjustment Note    Tasha Hawley is a 68 y.o. female being treated with the medication unasyn    Patient Data:    Vital Signs (Most Recent):  Temp: 97.4 °F (36.3 °C) (09/11/23 0724)  Pulse: 65 (09/11/23 0815)  Resp: 19 (09/11/23 0815)  BP: (!) 101/53 (09/11/23 0724)  SpO2: 97 % (09/11/23 0815) Vital Signs (72h Range):  Temp:  [97.4 °F (36.3 °C)-98.8 °F (37.1 °C)]   Pulse:  [53-76]   Resp:  [14-30]   BP: ()/(45-62)   SpO2:  [92 %-100 %]      Recent Labs   Lab 09/09/23 1945 09/11/23 0448   CREATININE 0.9 0.8     Serum creatinine: 0.8 mg/dL 09/11/23 0448  Estimated creatinine clearance: 80.3 mL/min    Medication:unasyn dose: 1.5g frequency q8 will be changed to medication:unasyn dose:3grams frequency:q8    Pharmacist's Name: Danis Ross  Pharmacist's Extension: [phone]

## 2023-09-11 NOTE — PLAN OF CARE
1330  Patient resting quietly in bed when CM rounded. No family present. Patient was admitted with cellulitis following a fall at home & is being followed by ortho surg. Pox 97% on 4L O2 via NC this AM.     Patient lives with her spouse, Dangelo Hawley ([phone]), has equipment to assist with ADLs, uses 4L O2 via NC at all times, is currently receiving home health services from [facility],& denied the need for assistance with transportation at time of discharge. Voicemail message left for Prerna with [facility] informing of the pt's hospitalization.    Future Appointments   Date Time Provider Department Center   9/12/2023  2:30 PM Amanda Head, SHON[facility] HEM ONC [facility]   9/14/2023  1:00 PM Thang Ordoñez [facility] VE OP [facility]   9/19/2023 11:00 AM Kaitlynn Hoyt MD [facility] RHEUM [facility]   9/21/2023  1:00 PM Thang Ordoñez [facility] VE OP [facility]   9/22/2023  9:20 AM Mesfin Hodges II, MD [facility] IM [facility] PCW   9/26/2023  1:30 PM Smitha Condon MD [facility] NEUROS8 [facility]   9/28/2023  3:30 PM Thang Ordoñez CCC-SLP VE OP [facility]   10/4/2023  1:30 PM Ncik Chilel MD [facility] PULMSVC [facility]   10/27/2023 10:00 AM Juan A Madera MD [facility] PSYCH [facility]         Will continue to follow.

## 2023-09-11 NOTE — NURSING
Patient reports SOB with just talking, oxygen sats are 100% on 4 l nc, reported to md awaiting response.

## 2023-09-11 NOTE — NURSING
Received report from Estela, received the patient sitting up in bed, bed locked in low with alarm on, call light in reach, patient instructed to call for assistance.

## 2023-09-11 NOTE — SUBJECTIVE & OBJECTIVE
Interval History: awake, alert, reported patient complained of dyspnea this morning.   CXR- Patchy opacities in both lungs, progressed since the prior study.  Enlarging small pleural effusions- Give 1 dose of extra lasix and switch home regimen to IV     Blood culture NGTD- continue Unasyn  Lower extremity erythema improving -continue Unasyn  Ortho consult pending    Review of Systems   Constitutional:  Positive for activity change. Negative for chills and fever.   Respiratory:  Positive for shortness of breath. Negative for apnea and chest tightness.    Genitourinary:  Negative for dyspareunia.   Skin:  Positive for color change.   Neurological:  Negative for dizziness, syncope and weakness.   Psychiatric/Behavioral:  Negative for agitation and confusion.      Objective:     Vital Signs (Most Recent):  Temp: 98.1 °F (36.7 °C) (09/11/23 1154)  Pulse: 80 (09/11/23 1206)  Resp: 18 (09/11/23 1154)  BP: 125/61 (09/11/23 1154)  SpO2: 99 % (09/11/23 1154) Vital Signs (24h Range):  Temp:  [97.4 °F (36.3 °C)-98.8 °F (37.1 °C)] 98.1 °F (36.7 °C)  Pulse:  [53-80] 80  Resp:  [18-24] 18  SpO2:  [95 %-100 %] 99 %  BP: ()/(52-61) 125/61     Weight: 100.7 kg (222 lb 0.1 oz)  Body mass index is 36.94 kg/m².    Intake/Output Summary (Last 24 hours) at 9/11/2023 1454  Last data filed at 9/11/2023 1200  Gross per 24 hour   Intake 1206.48 ml   Output 4350 ml   Net -3143.52 ml           Physical Exam  Constitutional:       Appearance: Normal appearance.   HENT:      Head: Normocephalic.   Cardiovascular:      Rate and Rhythm: Normal rate and regular rhythm.   Pulmonary:      Effort: Pulmonary effort is normal.   Abdominal:      General: Abdomen is flat. Bowel sounds are normal.      Palpations: Abdomen is soft.   Musculoskeletal:         General: Swelling, tenderness, deformity and signs of injury present.      Cervical back: Normal range of motion.      Right lower leg: Edema present.      Left lower leg: Edema present.   Skin:    "  Findings: Erythema present.   Neurological:      Mental Status: She is alert.             Significant Labs: A1C:   Recent Labs   Lab 07/09/23  0347   HGBA1C 5.2       ABGs: No results for input(s): "PH", "PCO2", "HCO3", "POCSATURATED", "BE", "TOTALHB", "COHB", "METHB", "O2HB", "POCFIO2", "PO2" in the last 48 hours.  CBC:   Recent Labs   Lab 09/09/23 1945 09/11/23 0448   WBC 7.11 5.61   HGB 11.6* 9.6*   HCT 37.3 32.0*    207       CMP:   Recent Labs   Lab 09/09/23 1945 09/11/23 0448    145   K 3.1* 3.5   CL 97 102   CO2 30* 34*    112*   BUN 7* 5*   CREATININE 0.9 0.8   CALCIUM 9.5 8.5*   PROT 7.5  --    ALBUMIN 3.7  --    BILITOT 0.5  --    ALKPHOS 162*  --    AST 12  --    ALT 7*  --    ANIONGAP 13 9       Lactic Acid:   Recent Labs   Lab 09/09/23 1945 09/09/23  2323   LACTATE 1.4 1.2       Lipase: No results for input(s): "LIPASE" in the last 48 hours.  Lipid Panel: No results for input(s): "CHOL", "HDL", "LDLCALC", "TRIG", "CHOLHDL" in the last 48 hours.  Magnesium: No results for input(s): "MG" in the last 48 hours.  Troponin:   Recent Labs   Lab 09/09/23 1945   TROPONINI <0.006       TSH:   Recent Labs   Lab 07/03/23  0826   TSH <0.015*       Urine Culture: No results for input(s): "LABURIN" in the last 48 hours.  Urine Studies:   Recent Labs   Lab 09/09/23 2013   COLORU Yellow   APPEARANCEUA Hazy*   PHUR 7.0   SPECGRAV 1.005   PROTEINUA Negative   GLUCUA Negative   KETONESU Negative   BILIRUBINUA Negative   OCCULTUA 1+*   NITRITE Negative   UROBILINOGEN Negative   LEUKOCYTESUR 2+*   RBCUA 6*   WBCUA 9*   BACTERIA Rare   HYALINECASTS 1         Significant Imaging: I have reviewed all pertinent imaging results/findings within the past 24 hours.  "

## 2023-09-11 NOTE — PLAN OF CARE
Pt is AA0X4.  Scheduled meds were given per MAR. No C/O N/V, Norco was given to alleviate pain. Spouse was present at the bedside. Regular diet maintained. Telemetry continued. Pt on 4L NC sats 95%.  Bed in lowest position, bed alarm is set. Call light within reach.   Problem: Adult Inpatient Plan of Care  Goal: Plan of Care Review  Outcome: Ongoing, Progressing  Goal: Readiness for Transition of Care  Outcome: Ongoing, Progressing     Problem: Impaired Wound Healing  Goal: Optimal Wound Healing  Outcome: Ongoing, Progressing     Problem: Skin or Soft Tissue Infection  Goal: Absence of Infection Signs and Symptoms  Outcome: Ongoing, Progressing

## 2023-09-11 NOTE — PLAN OF CARE
CM met with pt at bedside to discuss discharge planning  Dx: SOB, Cellulitis BLE  Pt is independent with ADL's. Pt has  DME/HH services at home with [facility]  When  discharged, pts family member spouse  DANGELO [phone] will provide transport home. Pt will be dc to home with HH when discharge is ordered.  Pt made aware to contact CM with any questions or concerns   Cm will continue to follow and assist with any discharge needs    Future Appointments   Date Time Provider Department Center   9/12/2023  2:30 PM Amanda Head, NP [facility] HEM ONC [facility]   9/14/2023  1:00 PM Thang Ordoñez [facility]P [facility]   9/19/2023 11:00 AM Kaitlynn Hoyt MD [facility] RHEUM [facility]   9/21/2023  1:00 PM Thang Ordoñez CCC[facility]P [facility]   9/22/2023  9:20 AM Mesfin Hodges II, MD [facility] IM [facility] PCW   9/26/2023  1:30 PM Smitha Condon MD [facility] NEUROS8 [facility]   9/28/2023  3:30 PM Thang Ordoñez CCC[facility]SLP [facility]   10/4/2023  1:30 PM Nick Chilel MD [facility] PULMSVC [facility]   10/27/2023 10:00 AM Juan A Madera MD [facility] PSYCH [facility]        09/11/23 1349   Discharge Assessment   Assessment Type Discharge Planning Assessment   Confirmed/corrected address, phone number and insurance Yes   Confirmed Demographics Correct on Facesheet   Source of Information patient   Communicated JACKIE with patient/caregiver Date not available/Unable to determine   Reason For Admission SOB, Cellulitis BLE   People in Home spouse   Do you expect to return to your current living situation? Yes   Do you have help at home or someone to help you manage your care at home? Yes   Who are your caregiver(s) and their phone number(s)? Dangelo Yo [phone]   Prior to hospitilization cognitive status: Alert/Oriented   Current cognitive status: Alert/Oriented   Walking or Climbing Stairs ambulation difficulty, requires equipment   Mobility Management Rollator   Equipment Currently  Used at Home oxygen;cane, straight;wheelchair;rollator;bedside commode   Patient currently being followed by outpatient case management? Unable to determine (comments)   Do you currently have service(s) that help you manage your care at home? Yes   Name and Contact number of agency [facility]   Is the pt/caregiver preference to resume services with current agency Yes   Do you take prescription medications? Yes   Do you have prescription coverage? Yes   Coverage [employer] managed Medicare   Do you have any problems affording any of your prescribed medications? No   Who is going to help you get home at discharge? Spouse   How do you get to doctors appointments? family or friend will provide   Are you on dialysis? No   Do you take coumadin? No   DME Needed Upon Discharge  none   Discharge Plan A Home;Home with family;Home Health   Physical Activity   On average, how many days per week do you engage in moderate to strenuous exercise (like a brisk walk)? 0 days   On average, how many minutes do you engage in exercise at this level? 0 min   Financial Resource Strain   How hard is it for you to pay for the very basics like food, housing, medical care, and heating? Not hard   Housing Stability   In the last 12 months, was there a time when you were not able to pay the mortgage or rent on time? N   In the last 12 months, how many places have you lived? 1   In the last 12 months, was there a time when you did not have a steady place to sleep or slept in a shelter (including now)? N   Transportation Needs   In the past 12 months, has lack of transportation kept you from medical appointments or from getting medications? no   In the past 12 months, has lack of transportation kept you from meetings, work, or from getting things needed for daily living? No   Food Insecurity   Within the past 12 months, you worried that your food would run out before you got the money to buy more. Never true   Within the past 12 months, the food  "you bought just didn't last and you didn't have money to get more. Never true   Stress   Do you feel stress - tense, restless, nervous, or anxious, or unable to sleep at night because your mind is troubled all the time - these days? To some exte   Social Connections   In a typical week, how many times do you talk on the phone with family, friends, or neighbors?   (Stated" Once a month")   How often do you get together with friends or relatives?   (Pt stated "Not often at all")   How often do you attend Taoism or Gnosticism services? Never   Do you belong to any clubs or organizations such as Taoism groups, unions, fraternal or athletic groups, or school groups? No   How often do you attend meetings of the clubs or organizations you belong to? Never   Are you , , , , never , or living with a partner?    Alcohol Use   Q1: How often do you have a drink containing alcohol? Never   Q2: How many drinks containing alcohol do you have on a typical day when you are drinking? None   Q3: How often do you have six or more drinks on one occasion? Never   OTHER   Name(s) of People in Home [facility]           "

## 2023-09-12 PROBLEM — I95.0 IDIOPATHIC HYPOTENSION: Status: ACTIVE | Noted: 2023-09-12

## 2023-09-12 PROCEDURE — 25000003 PHARM REV CODE 250: Performed by: STUDENT IN AN ORGANIZED HEALTH CARE EDUCATION/TRAINING PROGRAM

## 2023-09-12 PROCEDURE — 25000242 PHARM REV CODE 250 ALT 637 W/ HCPCS: Performed by: NURSE PRACTITIONER

## 2023-09-12 PROCEDURE — 96360 HYDRATION IV INFUSION INIT: CPT | Mod: 59

## 2023-09-12 PROCEDURE — 94640 AIRWAY INHALATION TREATMENT: CPT

## 2023-09-12 PROCEDURE — G0378 HOSPITAL OBSERVATION PER HR: HCPCS

## 2023-09-12 PROCEDURE — 93010 EKG 12-LEAD: ICD-10-PCS | Mod: ,,, | Performed by: INTERNAL MEDICINE

## 2023-09-12 PROCEDURE — 96376 TX/PRO/DX INJ SAME DRUG ADON: CPT

## 2023-09-12 PROCEDURE — 93005 ELECTROCARDIOGRAM TRACING: CPT

## 2023-09-12 PROCEDURE — 93010 ELECTROCARDIOGRAM REPORT: CPT | Mod: ,,, | Performed by: INTERNAL MEDICINE

## 2023-09-12 PROCEDURE — 25000003 PHARM REV CODE 250: Performed by: FAMILY MEDICINE

## 2023-09-12 PROCEDURE — 63600175 PHARM REV CODE 636 W HCPCS: Performed by: FAMILY MEDICINE

## 2023-09-12 PROCEDURE — 63700000 PHARM REV CODE 250 ALT 637 W/O HCPCS: Performed by: FAMILY MEDICINE

## 2023-09-12 PROCEDURE — A4216 STERILE WATER/SALINE, 10 ML: HCPCS | Performed by: STUDENT IN AN ORGANIZED HEALTH CARE EDUCATION/TRAINING PROGRAM

## 2023-09-12 PROCEDURE — 63600175 PHARM REV CODE 636 W HCPCS: Performed by: STUDENT IN AN ORGANIZED HEALTH CARE EDUCATION/TRAINING PROGRAM

## 2023-09-12 PROCEDURE — 94761 N-INVAS EAR/PLS OXIMETRY MLT: CPT

## 2023-09-12 PROCEDURE — 99900035 HC TECH TIME PER 15 MIN (STAT)

## 2023-09-12 PROCEDURE — 96372 THER/PROPH/DIAG INJ SC/IM: CPT | Performed by: STUDENT IN AN ORGANIZED HEALTH CARE EDUCATION/TRAINING PROGRAM

## 2023-09-12 PROCEDURE — 96366 THER/PROPH/DIAG IV INF ADDON: CPT

## 2023-09-12 PROCEDURE — 27000221 HC OXYGEN, UP TO 24 HOURS

## 2023-09-12 RX ORDER — FLUTICASONE PROPIONATE 50 MCG
2 SPRAY, SUSPENSION (ML) NASAL DAILY
Status: DISCONTINUED | OUTPATIENT
Start: 2023-09-12 | End: 2023-09-14 | Stop reason: HOSPADM

## 2023-09-12 RX ADMIN — SODIUM CHLORIDE 250 ML: 9 INJECTION, SOLUTION INTRAVENOUS at 04:09

## 2023-09-12 RX ADMIN — MICONAZOLE NITRATE: 20 OINTMENT TOPICAL at 09:09

## 2023-09-12 RX ADMIN — HYDROCODONE BITARTRATE AND ACETAMINOPHEN 1 TABLET: 10; 325 TABLET ORAL at 02:09

## 2023-09-12 RX ADMIN — FLUOXETINE 60 MG: 20 CAPSULE ORAL at 09:09

## 2023-09-12 RX ADMIN — AMPICILLIN SODIUM AND SULBACTAM SODIUM 3 G: 2; 1 INJECTION, POWDER, FOR SOLUTION INTRAMUSCULAR; INTRAVENOUS at 06:09

## 2023-09-12 RX ADMIN — TIOTROPIUM BROMIDE INHALATION SPRAY 2 PUFF: 3.12 SPRAY, METERED RESPIRATORY (INHALATION) at 09:09

## 2023-09-12 RX ADMIN — FLUCONAZOLE 100 MG: 100 TABLET ORAL at 09:09

## 2023-09-12 RX ADMIN — ASPIRIN 81 MG: 81 TABLET, COATED ORAL at 09:09

## 2023-09-12 RX ADMIN — TRAZODONE HYDROCHLORIDE 300 MG: 100 TABLET ORAL at 10:09

## 2023-09-12 RX ADMIN — Medication 10 ML: at 06:09

## 2023-09-12 RX ADMIN — POTASSIUM CHLORIDE 20 MEQ: 1500 TABLET, EXTENDED RELEASE ORAL at 09:09

## 2023-09-12 RX ADMIN — MICONAZOLE NITRATE: 20 OINTMENT TOPICAL at 10:09

## 2023-09-12 RX ADMIN — ATORVASTATIN CALCIUM 80 MG: 40 TABLET, FILM COATED ORAL at 09:09

## 2023-09-12 RX ADMIN — AMPICILLIN SODIUM AND SULBACTAM SODIUM 3 G: 2; 1 INJECTION, POWDER, FOR SOLUTION INTRAMUSCULAR; INTRAVENOUS at 12:09

## 2023-09-12 RX ADMIN — HYDROMORPHONE HYDROCHLORIDE 0.5 MG: 1 INJECTION, SOLUTION INTRAMUSCULAR; INTRAVENOUS; SUBCUTANEOUS at 09:09

## 2023-09-12 RX ADMIN — FUROSEMIDE 80 MG: 10 INJECTION, SOLUTION INTRAMUSCULAR; INTRAVENOUS at 11:09

## 2023-09-12 RX ADMIN — QUETIAPINE FUMARATE 200 MG: 100 TABLET ORAL at 10:09

## 2023-09-12 RX ADMIN — LEVOTHYROXINE SODIUM 150 MCG: 75 TABLET ORAL at 06:09

## 2023-09-12 RX ADMIN — ENOXAPARIN SODIUM 40 MG: 40 INJECTION SUBCUTANEOUS at 04:09

## 2023-09-12 RX ADMIN — SENNOSIDES 3 TABLET: 8.6 TABLET, FILM COATED ORAL at 10:09

## 2023-09-12 RX ADMIN — AMPICILLIN SODIUM AND SULBACTAM SODIUM 3 G: 2; 1 INJECTION, POWDER, FOR SOLUTION INTRAMUSCULAR; INTRAVENOUS at 11:09

## 2023-09-12 RX ADMIN — AMPICILLIN SODIUM AND SULBACTAM SODIUM 3 G: 2; 1 INJECTION, POWDER, FOR SOLUTION INTRAMUSCULAR; INTRAVENOUS at 05:09

## 2023-09-12 RX ADMIN — FUROSEMIDE 80 MG: 10 INJECTION, SOLUTION INTRAMUSCULAR; INTRAVENOUS at 09:09

## 2023-09-12 RX ADMIN — FLUTICASONE PROPIONATE 100 MCG: 50 SPRAY, METERED NASAL at 11:09

## 2023-09-12 RX ADMIN — HYDROCODONE BITARTRATE AND ACETAMINOPHEN 1 TABLET: 10; 325 TABLET ORAL at 05:09

## 2023-09-12 NOTE — SUBJECTIVE & OBJECTIVE
Interval History:   Seen and examined falling asleep while seeing patient but stated still has B/L lower Ext pain, erythematous and swollen, with notion of constipation    Review of Systems   Constitutional:  Positive for activity change. Negative for appetite change, chills and fever.   Respiratory:  Positive for chest tightness and shortness of breath. Negative for cough.    Gastrointestinal:  Negative for abdominal distention, abdominal pain and constipation.   Musculoskeletal:  Positive for back pain.   Neurological:  Positive for dizziness and weakness.   Psychiatric/Behavioral:  Negative for agitation and confusion.      Objective:     Vital Signs (Most Recent):  Temp: 98 °F (36.7 °C) (09/12/23 0731)  Pulse: (!) 58 (09/12/23 0908)  Resp: 16 (09/12/23 0908)  BP: (!) 108/53 (09/12/23 0731)  SpO2: 95 % (09/12/23 0908) Vital Signs (24h Range):  Temp:  [97.6 °F (36.4 °C)-98.3 °F (36.8 °C)] 98 °F (36.7 °C)  Pulse:  [53-80] 58  Resp:  [16-24] 16  SpO2:  [95 %-100 %] 95 %  BP: ()/(51-61) 108/53     Weight: 105.7 kg (233 lb 0.4 oz)  Body mass index is 38.78 kg/m².    Intake/Output Summary (Last 24 hours) at 9/12/2023 0923  Last data filed at 9/12/2023 0800  Gross per 24 hour   Intake 1060 ml   Output 8050 ml   Net -6990 ml         Physical Exam  Vitals and nursing note reviewed.   Constitutional:       General: She is not in acute distress.     Appearance: She is ill-appearing.   HENT:      Head: Normocephalic and atraumatic.      Mouth/Throat:      Mouth: Mucous membranes are moist.      Pharynx: No oropharyngeal exudate.   Eyes:      Extraocular Movements: Extraocular movements intact.      Pupils: Pupils are equal, round, and reactive to light.   Cardiovascular:      Rate and Rhythm: Normal rate. Rhythm irregular.      Heart sounds: No murmur heard.     No friction rub. No gallop.   Pulmonary:      Effort: Pulmonary effort is normal. No respiratory distress.      Breath sounds: No wheezing or rhonchi.    Abdominal:      General: Bowel sounds are normal. There is no distension.      Palpations: Abdomen is soft.      Tenderness: There is no abdominal tenderness. There is no guarding or rebound.   Musculoskeletal:         General: Swelling and tenderness present.      Cervical back: No rigidity or tenderness.      Right lower leg: Edema present.      Left lower leg: Edema present.   Skin:     Findings: Erythema, lesion and rash present.   Neurological:      General: No focal deficit present.      Mental Status: She is alert.      Motor: Weakness present.      Gait: Gait abnormal.   Psychiatric:         Thought Content: Thought content normal.             Significant Labs: All pertinent labs within the past 24 hours have been reviewed.  BMP:   Recent Labs   Lab 09/11/23 0448   *      K 3.5      CO2 34*   BUN 5*   CREATININE 0.8   CALCIUM 8.5*     CBC:   Recent Labs   Lab 09/11/23 0448   WBC 5.61   HGB 9.6*   HCT 32.0*        Recent Lab Results       None            Significant Imaging: I have reviewed all pertinent imaging results/findings within the past 24 hours.

## 2023-09-12 NOTE — PLAN OF CARE
CM met with pt at bedside to discuss discharge planning  When discharged will go home with HH or SNF  Pt made aware to contact CM with any questions or concerns   Cm will continue to follow and assist with any discharge needs  Future Appointments   Date Time Provider Department Center   9/14/2023  1:00 PM Thang Ordoñez CCC-SLP VE OP [facility]   9/19/2023 11:00 AM Kaitlynn Hoyt MD [facility] RHEUM [facility] Ort   9/21/2023  1:00 PM Thang Ordoñez CCC-SLP [facility] OP [facility]   9/22/2023  9:20 AM Mesfin Hodges II, MD [facility] IM [facility] PCW   9/22/2023  9:40 AM LAB, SPECIMEN [facility] LAB [facility]   9/26/2023  9:30 AM Amanda Head NP [facility] HEM ONC [facility]   9/26/2023  1:30 PM Smitha Condon MD [facility] NEUROS8 [facility]   9/28/2023  3:30 PM Thang Ordoñez CCC-SLP [facility] OP [facility] PT   10/4/2023  1:30 PM Nick Chilel MD [facility] PULMSVC [facility]   10/27/2023 10:00 AM Juan A Madera MD [facility] PSYCH [facility]        09/12/23 1606   Discharge Planning   Assessment Type Discharge Planning Assessment   Resource/Environmental Concerns none   Support Systems Spouse/significant other   Equipment Currently Used at Home rollator;oxygen;cane, straight;bedside commode;wheelchair   Current Living Arrangements home   Patient/Family Anticipates Transition to home;home with family   Patient/Family Anticipated Services at Transition home health care   DME Needed Upon Discharge  none   Discharge Plan A Home;Home Health   Discharge Plan B Skilled Nursing Facility

## 2023-09-12 NOTE — CONSULTS
[facility] Consult Note     Chief Complaint:  Right great toe proximal phalanx fracture     HPI:  68-year-old female with multiple complex medical problems including bilateral foot surgeries presented to the ED on Sunday after a mechanical fall.  She had pain to the right foot.  X-ray demonstrated a right great toe proximal phalanx fracture.  She also presented due to worsening pain and redness in the bilateral lower extremities.  She was admitted and started on IV antibiotics for cellulitis.  Orthopedics was consulted for recommendations regarding the great toe.     PMH:    Past Medical History:   Diagnosis Date    Anxiety     Arthritis     Bilateral lower extremity edema     severe chronic    Carotid artery occlusion     Cataract     CHF (congestive heart failure)     Coronary artery disease     subtotalled LAD with collateral    Depression     Fever blister     Hard of hearing     Hypokalemia 01/09/2023    Hyponatremia 02/04/2022    Hypothyroid     Iron deficiency anemia     Lumbar radiculopathy     with chronic pain    Ocular migraine     Other emphysema 05/22/2023    Renal disorder     Sleep apnea     cpap     PSH:    Past Surgical History:   Procedure Laterality Date    ADENOIDECTOMY      BACK SURGERY      x 12    CARDIAC CATHETERIZATION  2016    subtotalled LAD with right to left collaterals    CATARACT EXTRACTION W/  INTRAOCULAR LENS IMPLANT Left     Dr Coleman     CYSTOSCOPIC LITHOLAPAXY N/A 6/27/2019    Procedure: CYSTOLITHOLAPAXY;  Surgeon: Shireen Mayo MD;  Location: [facility] OR [address] [place] [address];  Service: Urology;  Laterality: N/A;    CYSTOSCOPIC LITHOLAPAXY N/A 9/3/2019    Procedure: CYSTOLITHOLAPAXY;  Surgeon: Shireen Mayo MD;  Location: [address];  Service: Urology;  Laterality: N/A;    CYSTOSCOPY N/A 7/13/2021    Procedure: CYSTOSCOPY;  Surgeon: Shireen Mayo MD;  Location: [facility] OR [address] [place] [address];  Service: Urology;  Laterality: N/A;    CYSTOSCOPY  11/16/2021    Procedure: CYSTOSCOPY;  Surgeon:  Shireen Mayo MD;  [facility];  Service: Urology;;    CYSTOSCOPY  7/19/2022    Procedure: CYSTOSCOPY;  Surgeon: Shireen Mayo MD;  [facility];  Service: Urology;;    CYSTOSCOPY WITH INJECTION OF PERIURETHRAL BULKING AGENT  7/19/2022    Procedure: CYSTOSCOPY, WITH PERIURETHRAL BULKING AGENT INJECTION-MACROPLASTIQUE;  Surgeon: Shireen Mayo MD;  [facility];  Service: Urology;;    CYSTOSCOPY WITH INJECTION OF PERIURETHRAL BULKING AGENT N/A 3/28/2023    Procedure: CYSTOSCOPY, WITH PERIURETHRAL BULKING AGENT INJECTION;  Surgeon: Shireen Mayo MD;  [facility];  Service: Urology;  Laterality: N/A;  [place]    CYSTOSCOPY,WITH BOTULINUM TOXIN INJECTION N/A 12/13/2022    Procedure: CYSTOSCOPY,WITH BOTULINUM TOXIN INJECTION;  Surgeon: Shireen Mayo MD;  [facility];  Service: Urology;  Laterality: N/A;  300 U    CYSTOSCOPY,WITH BOTULINUM TOXIN INJECTION N/A 3/28/2023    Procedure: CYSTOSCOPY,WITH BOTULINUM TOXIN INJECTION;  Surgeon: Shireen Mayo MD;  [facility];  Service: Urology;  Laterality: N/A;  45 MIN.    300 UNITS    ESOPHAGOGASTRODUODENOSCOPY N/A 5/23/2018    Procedure: ESOPHAGOGASTRODUODENOSCOPY (EGD);  Surgeon: Prince Vance MD;  Location: [facility] (4TH FLR);  Service: Endoscopy;  Laterality: N/A;  r/s 'd per Dr. Vance due to family emergency- ER    ESOPHAGOGASTRODUODENOSCOPY N/A 6/23/2023    Procedure: EGD (ESOPHAGOGASTRODUODENOSCOPY);  Surgeon: Juaquin Barry MD;  Location: [facility] ENDO (2ND FLR);  Service: Endoscopy;  Laterality: N/A;  Refferal: PRINCE VANCE  tele enc 5/18/23  dx: history of a Nissen fundoplication  Additional Scheduling Information:  On home oxygen 2nd floor for airway protection also with a pain pump elevated BMI close to 40   Prep Gastroparesis   ins    HYSTERECTOMY  1975    endometriosis    INJECTION OF BOTULINUM TOXIN TYPE A  7/13/2021    Procedure:  INJECTION, BOTULINUM TOXIN, 200units;  Surgeon: Shireen Mayo MD;  Location: [facility] OR [address] [place] [address];  Service: Urology;;    INJECTION OF BOTULINUM TOXIN TYPE A  11/16/2021    Procedure: INJECTION, BOTULINUM TOXIN, 200units;  Surgeon: Shireen Mayo MD;  [facility];  Service: Urology;;    INJECTION OF BOTULINUM TOXIN TYPE A  7/19/2022    Procedure: INJECTION, BOTULINUM TOXIN, 300 units ;  Surgeon: Shireen Mayo MD;  Location: [facility] OR [address] [place] [address];  Service: Urology;;    INSERTION, SUPRAPUBIC CATHETER N/A 12/13/2022    Procedure: INSERTION, SUPRAPUBIC CATHETER;  Surgeon: Shireen Mayo MD;  Location: [facility] OR [address] [place] [address];  Service: Urology;  Laterality: N/A;  exchange    pain pump placement      SQ Dilaudid Pump managed by Dr. Hillman, Pain Management    REMOVAL OF BONE SPUR OF FOOT Bilateral 9/16/2022    Procedure: EXCISION ARTHRITIC BONE, BILATERAL FOOT;  Surgeon: NICOLA Mcgrath;  Location: [facility] OR;  Service: Podiatry;  Laterality: Bilateral;    REPLACEMENT OF BACLOFEN PUMP N/A 8/2/2023    Procedure: REPLACEMENT, BACLOFEN PUMP;  Surgeon: Smitha Condon MD;  Location: [facility] OR [address] [place] [address];  Service: Neurosurgery;  Laterality: N/A;  Anes: Gen  Blood: Type & Screen  Pos: Left Lat  Intrathecal Pump  Bed: Reg Reversed  Rad: C-arm  Pump: 40 cc, medtronics    REPLACEMENT OF CATHETER N/A 10/31/2019    Procedure: REPLACEMENT, CATHETER-SUPRAPUBIC;  Surgeon: Shireen Mayo MD;  Location: [facility] OR [address] [place] [address];  Service: Urology;  Laterality: N/A;    SPINAL CORD STIMULATOR REMOVAL      before Larissa    SPINE SURGERY  5-13-13    CERVICAL FUSION    TONSILLECTOMY       Meds:    No current facility-administered medications on file prior to encounter.     Current Outpatient Medications on File Prior to Encounter   Medication Sig Dispense Refill    aspirin (ECOTRIN) 81 MG EC tablet Take 1 tablet (81 mg total) by mouth once daily. 90 tablet 3    atorvastatin (LIPITOR) 80 MG tablet Take 1 tablet (80 mg total) by mouth  once daily. 90 tablet 3    darifenacin (ENABLEX) 7.5 MG Tb24 Take 7.5 mg by mouth.      docusate sodium (COLACE) 100 MG capsule Take 200 mg by mouth every evening.      ferrous gluconate (FERGON) 324 MG tablet Take 1 tablet (324 mg total) by mouth daily with breakfast. 90 tablet 1    FLUoxetine 20 MG capsule Take 3 capsules (60 mg total) by mouth once daily. 270 capsule 3    fluticasone propionate (FLONASE) 50 mcg/actuation nasal spray 2 sprays (100 mcg total) by Each Nostril route once daily. 16 g 0    furosemide (LASIX) 40 MG tablet Take 2 tablets (80 mg total) by mouth 2 (two) times a day. 360 tablet 3    HYDROcodone-acetaminophen (NORCO)  mg per tablet Take 1 tablet by mouth every 6 (six) hours as needed for breakthrough pain.      hydrocortisone (CORTEF) 10 MG Tab Take 1 tablet (10 mg total) by mouth every evening. 30 tablet 5    hydrOXYzine (ATARAX) 50 MG tablet Take 1 tablet (50 mg total) by mouth every 4 (four) hours as needed for Anxiety. 30 tablet 0    intrathecal pain pump compound Hydromorphone (7.5 mg/mL) infusion at 8.59 mg/day (0.3578 mg/hr) out of a total reservoir volume of 40 mL  Pump filled monthly      levothyroxine (SYNTHROID) 150 MCG tablet Take 1 tablet (150 mcg total) by mouth before breakfast. 30 tablet 11    liothyronine (CYTOMEL) 25 MCG Tab Take 1 tablet (25 mcg total) by mouth once daily. 30 tablet 11    pantoprazole (PROTONIX) 40 MG tablet Take 1 tablet (40 mg total) by mouth once daily. 90 tablet 3    potassium chloride (MICRO-K) 10 MEQ CpSR Take 2 capsules (20 mEq total) by mouth once daily. 60 capsule 11    senna (SENNA LAX) 8.6 mg tablet Take 2 tablets by mouth 2 (two) times daily.      tiotropium bromide (SPIRIVA RESPIMAT) 2.5 mcg/actuation inhaler Inhale 2 puffs into the lungs once daily. Controller 4 g 1    traMADoL (ULTRAM) 50 mg tablet       butalbital-acetaminophen-caffeine -40 mg (FIORICET, ESGIC) -40 mg per tablet Take 1 tablet by mouth daily as needed.       cyanocobalamin, vitamin B-12, 5,000 mcg Subl Place 1 tablet under the tongue once daily.      fludrocortisone (FLORINEF) 0.1 mg Tab Take a half-tablet (50 mcg) by mouth once daily 15 tablet 5    ketorolac (TORADOL) 10 mg tablet Take 10 mg by mouth daily as needed.      LIDOcaine (LIDODERM) 5 % Place 1 patch onto the skin once daily. Remove & Discard patch within 12 hours or as directed by MD  0    loperamide HCl (IMODIUM A-D ORAL) Take by mouth as needed. Diarrhea      nitroGLYCERIN (NITROSTAT) 0.4 MG SL tablet Place 0.4 mg under the tongue every 5 (five) minutes as needed for Chest pain.      nystatin (MYCOSTATIN) powder Apply topically 4 (four) times daily. 60 g 0    ondansetron (ZOFRAN-ODT) 4 MG TbDL Take 4 mg by mouth every 4 to 6 hours as needed.      promethazine (PHENERGAN) 25 MG tablet Take 25 mg by mouth every 6 (six) hours as needed for Nausea.      QUEtiapine (SEROQUEL) 100 MG Tab TAKE 2 TABLETS (200 MG) BY MOUTH NIGHTLY (Patient taking differently: Take 200 mg by mouth nightly.) 180 tablet 3    traZODone (DESYREL) 300 MG tablet Take 1 tablet (300 mg total) by mouth every evening. 90 tablet 3       Allergies:    Review of patient's allergies indicates:   Allergen Reactions    (d)-limonene flavor      Other reaction(s): difficult intubation  Other reaction(s): Difficulty breathing    Bactrim [sulfamethoxazole-trimethoprim] Anaphylaxis    Benadryl [diphenhydramine hcl] Shortness Of Breath    Fentanyl Itching, Nausea And Vomiting and Swelling             Imitrex [sumatriptan succinate] Shortness Of Breath    Percocet [oxycodone-acetaminophen] Shortness Of Breath    Topamax [topiramate] Shortness Of Breath    Vancomycin Anaphylaxis     Rash    Butorphanol tartrate     Darvocet a500 [propoxyphene n-acetaminophen]      Other reaction(s): Difficulty breathing    Evening primrose (oenothera biennis)     Lyrica [pregabalin] Other (See Comments)     tremors    White petrolatum-zinc     Zinc oxide-white petrolatum   "    Other reaction(s): Difficulty breathing    Latex, natural rubber Itching and Rash    Phenytoin Rash and Other (See Comments)     Trouble breathing        ROS:  otherwise negative except indicated in HPI      Exam:  Vitals:  BP (!) 104/59   Pulse (!) 57   Temp 98.3 °F (36.8 °C)   Resp 18   Ht 5' 5" (1.651 m)   Wt 105.7 kg (233 lb 0.4 oz)   LMP  (LMP Unknown)   SpO2 97%   Breastfeeding No   BMI 38.78 kg/m²   Gen:  Awake and alert, NAD  Resp: No increased WOB  Cards: RRR by PP  Abd:  Non-distended, benign    Right lower extremity   No gross deformity, symmetric bilateral bunions  Tender to palpation throughout the foot  Cellulitic changes in the tibia, not so much in the foot  Able to wiggle toes   Neurovascularly intact with 1+ pulses     Labs:  Recent Labs     09/11/23  0448   WBC 5.61   HGB 9.6*   HCT 32.0*        Recent Labs     09/11/23  0448      K 3.5      CO2 34*   BUN 5*   *     No results for input(s): "ESR", "CRP" in the last 72 hours.     Imaging:  XR of the right foot demonstrates a great toe proximal phalanx fracture     Assessment/Plan:  68-year-old female history of multiple medical problems including bilateral foot surgeries who sustained a right great toe proximal phalanx fracture after a fall    -WBAT RLE, hard sole shoe  -Recommend podiatry consult for management of complex foot condition    Myles Calderón MD, PGY-5  [facility]         "

## 2023-09-12 NOTE — PROGRESS NOTES
[facility] Medicine  Progress Note    Patient Name: Tasha Hawley  MRN: [id number]  Patient Class: OP- Observation   Admission Date: 9/9/2023  Length of Stay: 0 days  Attending Physician: Jon Soto MD  Primary Care Provider: Mesfin Hodges II, MD        Subjective:     Principal Problem:Cellulitis        HPI:  68 year old woman with PMH of HFpEF, CAD, chronic back pain s/p multiple surgeries and now with intrathecal pump, derepression, chronic bilateral venous insufficiency. The patient amublates with a walker and she presented after a machanical fall 2 days ago and her xray was remarkable for fracture of the great toe proximal phalanx. She presented today due to worsening pain and redness in both of her lower ext.       Overview/Hospital Course:  9/12  No new acute event overnight on IV ABx with Unasyn      Interval History:   Seen and examined falling asleep while seeing patient but stated still has B/L lower Ext pain, erythematous and swollen, with notion of constipation    Review of Systems   Constitutional:  Positive for activity change. Negative for appetite change, chills and fever.   Respiratory:  Positive for chest tightness and shortness of breath. Negative for cough.    Gastrointestinal:  Negative for abdominal distention, abdominal pain and constipation.   Musculoskeletal:  Positive for back pain.   Neurological:  Positive for dizziness and weakness.   Psychiatric/Behavioral:  Negative for agitation and confusion.      Objective:     Vital Signs (Most Recent):  Temp: 98 °F (36.7 °C) (09/12/23 0731)  Pulse: (!) 58 (09/12/23 0908)  Resp: 16 (09/12/23 0908)  BP: (!) 108/53 (09/12/23 0731)  SpO2: 95 % (09/12/23 0908) Vital Signs (24h Range):  Temp:  [97.6 °F (36.4 °C)-98.3 °F (36.8 °C)] 98 °F (36.7 °C)  Pulse:  [53-80] 58  Resp:  [16-24] 16  SpO2:  [95 %-100 %] 95 %  BP: ()/(51-61) 108/53     Weight: 105.7 kg (233 lb 0.4 oz)  Body mass index is 38.78 kg/m².    Intake/Output  Summary (Last 24 hours) at 9/12/2023 0923  Last data filed at 9/12/2023 0800  Gross per 24 hour   Intake 1060 ml   Output 8050 ml   Net -6990 ml         Physical Exam  Vitals and nursing note reviewed.   Constitutional:       General: She is not in acute distress.     Appearance: She is ill-appearing.   HENT:      Head: Normocephalic and atraumatic.      Mouth/Throat:      Mouth: Mucous membranes are moist.      Pharynx: No oropharyngeal exudate.   Eyes:      Extraocular Movements: Extraocular movements intact.      Pupils: Pupils are equal, round, and reactive to light.   Cardiovascular:      Rate and Rhythm: Normal rate. Rhythm irregular.      Heart sounds: No murmur heard.     No friction rub. No gallop.   Pulmonary:      Effort: Pulmonary effort is normal. No respiratory distress.      Breath sounds: No wheezing or rhonchi.   Abdominal:      General: Bowel sounds are normal. There is no distension.      Palpations: Abdomen is soft.      Tenderness: There is no abdominal tenderness. There is no guarding or rebound.   Musculoskeletal:         General: Swelling and tenderness present.      Cervical back: No rigidity or tenderness.      Right lower leg: Edema present.      Left lower leg: Edema present.   Skin:     Findings: Erythema, lesion and rash present.   Neurological:      General: No focal deficit present.      Mental Status: She is alert.      Motor: Weakness present.      Gait: Gait abnormal.   Psychiatric:         Thought Content: Thought content normal.             Significant Labs: All pertinent labs within the past 24 hours have been reviewed.  BMP:   Recent Labs   Lab 09/11/23 0448   *      K 3.5      CO2 34*   BUN 5*   CREATININE 0.8   CALCIUM 8.5*     CBC:   Recent Labs   Lab 09/11/23 0448   WBC 5.61   HGB 9.6*   HCT 32.0*        Recent Lab Results       None            Significant Imaging: I have reviewed all pertinent imaging results/findings within the past 24  hours.      Assessment/Plan:      * Cellulitis    Non purulent cellulitis in both lower ext   Start Unasyn 1.5 mg q6hr- continue  Can switch to Augmentin upon discharge      Idiopathic hypotension  Respond well to fluid bolus  Repeat 12 lead EKG sinus riley  Cont monitor      Fracture, phalanx, foot  Right ankle x-ray :1. Nondisplaced fracture of the right great toe proximal phalanx.  2. Lucency through the posterior aspect of the right calcaneus, may represent a nondisplaced fracture, correlate with point tenderness.  Right foot x-ray: Fracture of the great toe proximal phalanx.  Right tibia/fibula x-ray: Fracture of the great toe proximal phalanx.  Ortho consult -pending evaluation    Hypokalemia  Replace      UTI (urinary tract infection)  Current UTI   UA- positive- with yeast  Diflucan       Recurrent UTI  UA positive with yeast      Bradycardia  Chronic  monitor      Hypothyroidism (acquired)  Pending TSH  Cont home levothyroxine       Congestive heart failure  HFpEF  The patient is not in acute decompensation   She was given Iv lasix in the ED. We will hold her home lasix tonight   Stat home lasix - continue. Give extra dose on 7/11 and switch to IV  KCL 20 daily       VTE Risk Mitigation (From admission, onward)         Ordered     enoxaparin injection 40 mg  Daily         09/09/23 2305     IP VTE HIGH RISK PATIENT  Once         09/09/23 2305     Place sequential compression device  Until discontinued         09/09/23 2305                Discharge Planning   JACKIE:      Code Status: Full Code   Is the patient medically ready for discharge?:     Reason for patient still in hospital (select all that apply): Patient new problem and Patient trending condition  Discharge Plan A: Home, Home Health                  Jon Soto MD  Department of Hospital Medicine   [facility]

## 2023-09-12 NOTE — PLAN OF CARE
AAOx4. PRN pain meds given. Tolerating regular diet. IV ABX given per MAR. Remains on 5L NC. Suprapubic cath intact draining clear yellow urine. Cardiac monitoring maintained. Bed locked in lowest position, bed alarm set and call bell within reach.

## 2023-09-12 NOTE — PLAN OF CARE
Orthopedic plan of care note     68-year-old female with multiple medical problems who presented to the ED after a mechanical fall on 09/08.  She was found to have a fracture of her right great toe proximal phalanx.  X-rays demonstrate a fracture of the proximal phalanx of the great toe.  No open wound.      Recommendation:   Weightbearing as tolerated right lower extremity in hard sole shoe  Follow-up p.r.n.

## 2023-09-12 NOTE — PLAN OF CARE
Patient on oxygen with documented flow.  Will attempt to wean per O2 order protocol.  inhaled corticosteroids

## 2023-09-12 NOTE — HOSPITAL COURSE
9/12  No new acute event overnight on IV ABx with Unasyn  9/13  Was seen by podiatry   68 year old woman with PMH of HFpEF, CAD, chronic back pain s/p multiple surgeries and now with intrathecal pump, derepression, chronic bilateral venous insufficiency, was admitted for notion of mechanical fall 2 days ago and her xray was remarkable for fracture of the great toe proximal phalanx. During the course of this admission, she had serial routine labs without major significance. Her CT foot showed  Minimally displaced fracture of the proximal phalanx of the 1st digit with soft tissue swelling. Remote trauma with deformity of the 5th metatarsal. 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 She was seen by ortho  Recommend minimal weightbearing in fracture boot at all times while ambulating with assistance as tolerated  Rest, elevate. She was also seen by podiatry WBAT RLE, hard sole shoe. Her pain has been under controlled with episode of sudden falling sleep. Due to her frail condition case management was consulted which she has refused. She will be DC home with HH, F/U with PCP and ortho.

## 2023-09-13 PROBLEM — S92.411A DISPLACED FRACTURE OF PROXIMAL PHALANX OF RIGHT GREAT TOE, INITIAL ENCOUNTER FOR CLOSED FRACTURE: Status: ACTIVE | Noted: 2023-09-13

## 2023-09-13 LAB
ALBUMIN SERPL BCP-MCNC: 2.9 G/DL (ref 3.5–5.2)
ALP SERPL-CCNC: 143 U/L (ref 55–135)
ALT SERPL W/O P-5'-P-CCNC: 7 U/L (ref 10–44)
ANION GAP SERPL CALC-SCNC: 9 MMOL/L (ref 8–16)
AST SERPL-CCNC: 10 U/L (ref 10–40)
BASOPHILS # BLD AUTO: 0.01 K/UL (ref 0–0.2)
BASOPHILS NFR BLD: 0.1 % (ref 0–1.9)
BILIRUB SERPL-MCNC: 0.3 MG/DL (ref 0.1–1)
BUN SERPL-MCNC: 6 MG/DL (ref 8–23)
CALCIUM SERPL-MCNC: 9 MG/DL (ref 8.7–10.5)
CHLORIDE SERPL-SCNC: 94 MMOL/L (ref 95–110)
CO2 SERPL-SCNC: 36 MMOL/L (ref 23–29)
CREAT SERPL-MCNC: 0.8 MG/DL (ref 0.5–1.4)
DIFFERENTIAL METHOD: ABNORMAL
EOSINOPHIL # BLD AUTO: 0.4 K/UL (ref 0–0.5)
EOSINOPHIL NFR BLD: 4.8 % (ref 0–8)
ERYTHROCYTE [DISTWIDTH] IN BLOOD BY AUTOMATED COUNT: 15.1 % (ref 11.5–14.5)
EST. GFR  (NO RACE VARIABLE): >60 ML/MIN/1.73 M^2
GLUCOSE SERPL-MCNC: 112 MG/DL (ref 70–110)
HCT VFR BLD AUTO: 35.7 % (ref 37–48.5)
HGB BLD-MCNC: 10.9 G/DL (ref 12–16)
IMM GRANULOCYTES # BLD AUTO: 0.03 K/UL (ref 0–0.04)
IMM GRANULOCYTES NFR BLD AUTO: 0.3 % (ref 0–0.5)
LYMPHOCYTES # BLD AUTO: 1.2 K/UL (ref 1–4.8)
LYMPHOCYTES NFR BLD: 13.3 % (ref 18–48)
MCH RBC QN AUTO: 26.7 PG (ref 27–31)
MCHC RBC AUTO-ENTMCNC: 30.5 G/DL (ref 32–36)
MCV RBC AUTO: 87 FL (ref 82–98)
MONOCYTES # BLD AUTO: 0.5 K/UL (ref 0.3–1)
MONOCYTES NFR BLD: 5.7 % (ref 4–15)
NEUTROPHILS # BLD AUTO: 6.5 K/UL (ref 1.8–7.7)
NEUTROPHILS NFR BLD: 75.8 % (ref 38–73)
NRBC BLD-RTO: 0 /100 WBC
PLATELET # BLD AUTO: 224 K/UL (ref 150–450)
PMV BLD AUTO: 9.6 FL (ref 9.2–12.9)
POTASSIUM SERPL-SCNC: 3.7 MMOL/L (ref 3.5–5.1)
PROT SERPL-MCNC: 6.3 G/DL (ref 6–8.4)
RBC # BLD AUTO: 4.09 M/UL (ref 4–5.4)
SODIUM SERPL-SCNC: 139 MMOL/L (ref 136–145)
WBC # BLD AUTO: 8.63 K/UL (ref 3.9–12.7)

## 2023-09-13 PROCEDURE — 99214 PR OFFICE/OUTPT VISIT, EST, LEVL IV, 30-39 MIN: ICD-10-PCS | Mod: ,,, | Performed by: STUDENT IN AN ORGANIZED HEALTH CARE EDUCATION/TRAINING PROGRAM

## 2023-09-13 PROCEDURE — 25000003 PHARM REV CODE 250: Performed by: STUDENT IN AN ORGANIZED HEALTH CARE EDUCATION/TRAINING PROGRAM

## 2023-09-13 PROCEDURE — 63700000 PHARM REV CODE 250 ALT 637 W/O HCPCS: Performed by: FAMILY MEDICINE

## 2023-09-13 PROCEDURE — 80053 COMPREHEN METABOLIC PANEL: CPT | Performed by: FAMILY MEDICINE

## 2023-09-13 PROCEDURE — 94640 AIRWAY INHALATION TREATMENT: CPT

## 2023-09-13 PROCEDURE — 99213 OFFICE O/P EST LOW 20 MIN: CPT | Mod: ,,, | Performed by: ORTHOPAEDIC SURGERY

## 2023-09-13 PROCEDURE — 97165 OT EVAL LOW COMPLEX 30 MIN: CPT

## 2023-09-13 PROCEDURE — 96376 TX/PRO/DX INJ SAME DRUG ADON: CPT

## 2023-09-13 PROCEDURE — 25000242 PHARM REV CODE 250 ALT 637 W/ HCPCS: Performed by: NURSE PRACTITIONER

## 2023-09-13 PROCEDURE — 99213 PR OFFICE/OUTPT VISIT, EST, LEVL III, 20-29 MIN: ICD-10-PCS | Mod: ,,, | Performed by: ORTHOPAEDIC SURGERY

## 2023-09-13 PROCEDURE — 99214 OFFICE O/P EST MOD 30 MIN: CPT | Mod: ,,, | Performed by: STUDENT IN AN ORGANIZED HEALTH CARE EDUCATION/TRAINING PROGRAM

## 2023-09-13 PROCEDURE — 97530 THERAPEUTIC ACTIVITIES: CPT

## 2023-09-13 PROCEDURE — 25000003 PHARM REV CODE 250: Performed by: FAMILY MEDICINE

## 2023-09-13 PROCEDURE — 27000221 HC OXYGEN, UP TO 24 HOURS

## 2023-09-13 PROCEDURE — 96372 THER/PROPH/DIAG INJ SC/IM: CPT | Performed by: STUDENT IN AN ORGANIZED HEALTH CARE EDUCATION/TRAINING PROGRAM

## 2023-09-13 PROCEDURE — 97116 GAIT TRAINING THERAPY: CPT

## 2023-09-13 PROCEDURE — 63600175 PHARM REV CODE 636 W HCPCS: Performed by: FAMILY MEDICINE

## 2023-09-13 PROCEDURE — 85025 COMPLETE CBC W/AUTO DIFF WBC: CPT | Performed by: FAMILY MEDICINE

## 2023-09-13 PROCEDURE — 94761 N-INVAS EAR/PLS OXIMETRY MLT: CPT

## 2023-09-13 PROCEDURE — 97162 PT EVAL MOD COMPLEX 30 MIN: CPT

## 2023-09-13 PROCEDURE — 36415 COLL VENOUS BLD VENIPUNCTURE: CPT | Performed by: FAMILY MEDICINE

## 2023-09-13 PROCEDURE — 99900035 HC TECH TIME PER 15 MIN (STAT)

## 2023-09-13 PROCEDURE — G0378 HOSPITAL OBSERVATION PER HR: HCPCS

## 2023-09-13 PROCEDURE — 97535 SELF CARE MNGMENT TRAINING: CPT

## 2023-09-13 PROCEDURE — 96366 THER/PROPH/DIAG IV INF ADDON: CPT

## 2023-09-13 PROCEDURE — 63600175 PHARM REV CODE 636 W HCPCS: Performed by: STUDENT IN AN ORGANIZED HEALTH CARE EDUCATION/TRAINING PROGRAM

## 2023-09-13 RX ORDER — FAMOTIDINE 20 MG/1
20 TABLET, FILM COATED ORAL DAILY
Status: DISCONTINUED | OUTPATIENT
Start: 2023-09-14 | End: 2023-09-14 | Stop reason: HOSPADM

## 2023-09-13 RX ORDER — HEPARIN 100 UNIT/ML
SYRINGE INTRAVENOUS
Status: CANCELLED | OUTPATIENT
Start: 2023-09-13

## 2023-09-13 RX ORDER — FUROSEMIDE 10 MG/ML
40 INJECTION INTRAMUSCULAR; INTRAVENOUS
Status: DISCONTINUED | OUTPATIENT
Start: 2023-09-13 | End: 2023-09-14 | Stop reason: HOSPADM

## 2023-09-13 RX ORDER — PANTOPRAZOLE SODIUM 40 MG/1
40 TABLET, DELAYED RELEASE ORAL DAILY
Status: DISCONTINUED | OUTPATIENT
Start: 2023-09-13 | End: 2023-09-13

## 2023-09-13 RX ADMIN — ATORVASTATIN CALCIUM 80 MG: 40 TABLET, FILM COATED ORAL at 08:09

## 2023-09-13 RX ADMIN — SENNOSIDES 3 TABLET: 8.6 TABLET, FILM COATED ORAL at 08:09

## 2023-09-13 RX ADMIN — AMPICILLIN SODIUM AND SULBACTAM SODIUM 3 G: 2; 1 INJECTION, POWDER, FOR SOLUTION INTRAMUSCULAR; INTRAVENOUS at 12:09

## 2023-09-13 RX ADMIN — AMPICILLIN SODIUM AND SULBACTAM SODIUM 3 G: 2; 1 INJECTION, POWDER, FOR SOLUTION INTRAMUSCULAR; INTRAVENOUS at 05:09

## 2023-09-13 RX ADMIN — MICONAZOLE NITRATE: 20 OINTMENT TOPICAL at 08:09

## 2023-09-13 RX ADMIN — HYDROMORPHONE HYDROCHLORIDE 0.5 MG: 1 INJECTION, SOLUTION INTRAMUSCULAR; INTRAVENOUS; SUBCUTANEOUS at 03:09

## 2023-09-13 RX ADMIN — ASPIRIN 81 MG: 81 TABLET, COATED ORAL at 08:09

## 2023-09-13 RX ADMIN — POTASSIUM CHLORIDE 20 MEQ: 1500 TABLET, EXTENDED RELEASE ORAL at 08:09

## 2023-09-13 RX ADMIN — HYDROCODONE BITARTRATE AND ACETAMINOPHEN 1 TABLET: 10; 325 TABLET ORAL at 01:09

## 2023-09-13 RX ADMIN — ENOXAPARIN SODIUM 40 MG: 40 INJECTION SUBCUTANEOUS at 05:09

## 2023-09-13 RX ADMIN — FLUOXETINE 60 MG: 20 CAPSULE ORAL at 08:09

## 2023-09-13 RX ADMIN — FLUTICASONE PROPIONATE 100 MCG: 50 SPRAY, METERED NASAL at 09:09

## 2023-09-13 RX ADMIN — FLUCONAZOLE 100 MG: 100 TABLET ORAL at 08:09

## 2023-09-13 RX ADMIN — QUETIAPINE FUMARATE 200 MG: 100 TABLET ORAL at 08:09

## 2023-09-13 RX ADMIN — LEVOTHYROXINE SODIUM 150 MCG: 75 TABLET ORAL at 05:09

## 2023-09-13 RX ADMIN — HYDROMORPHONE HYDROCHLORIDE 0.5 MG: 1 INJECTION, SOLUTION INTRAMUSCULAR; INTRAVENOUS; SUBCUTANEOUS at 08:09

## 2023-09-13 RX ADMIN — TRAZODONE HYDROCHLORIDE 300 MG: 100 TABLET ORAL at 08:09

## 2023-09-13 RX ADMIN — TIOTROPIUM BROMIDE INHALATION SPRAY 2 PUFF: 3.12 SPRAY, METERED RESPIRATORY (INHALATION) at 08:09

## 2023-09-13 RX ADMIN — PANTOPRAZOLE SODIUM 40 MG: 40 TABLET, DELAYED RELEASE ORAL at 03:09

## 2023-09-13 RX ADMIN — FUROSEMIDE 40 MG: 10 INJECTION, SOLUTION INTRAMUSCULAR; INTRAVENOUS at 08:09

## 2023-09-13 NOTE — PLAN OF CARE
Pt was accepted by [facility]. Will send orders when written. Was seen by Podiatry for consult. PCP and Podiatry f/u appt made and put in chart for follow up.

## 2023-09-13 NOTE — PROGRESS NOTES
NURSE PROACTIVE ROUNDING NOTE       Chart reviewing and charted temp 93.1. Recheck 97.7.     FOLLOW UP    Call back the Rapid Response NurseJohn at [phone] for additional questions or concerns.

## 2023-09-13 NOTE — PLAN OF CARE
Patient is AAOx4. Patient is on oxygen at 2LNC. Tolerated regular diet well. Walked upto wheelchair with 2 person assist. Went for CT rt foot. SPC intact. Call light within reach. Heart monitoring continued. Plan of care ongoing.

## 2023-09-13 NOTE — ASSESSMENT & PLAN NOTE
CT ordered, results with minimally displaced fracture of the proximal phalanx of the 1st digit with soft tissue swelling. No fracture to calcaneus on CT.   Recommend minimal weightbearing in fracture boot at all times while ambulating  RICE  Continue follow up with Podiatry upon D/C, follows outpatient with Dr. Mcguire

## 2023-09-13 NOTE — SUBJECTIVE & OBJECTIVE
Interval History:   Seen and examined no acute event overnight, no spike of fever    Review of Systems   All other systems reviewed and are negative.    Objective:     Vital Signs (Most Recent):  Temp: 97.1 °F (36.2 °C) (09/13/23 1523)  Pulse: (!) 54 (09/13/23 1523)  Resp: 18 (09/13/23 1546)  BP: (!) 100/49 (09/13/23 1523)  SpO2: 97 % (09/13/23 1523) Vital Signs (24h Range):  Temp:  [93.1 °F (33.9 °C)-98.5 °F (36.9 °C)] 97.1 °F (36.2 °C)  Pulse:  [53-66] 54  Resp:  [16-20] 18  SpO2:  [93 %-99 %] 97 %  BP: ()/(49-55) 100/49     Weight: 103.8 kg (228 lb 13.4 oz)  Body mass index is 38.08 kg/m².    Intake/Output Summary (Last 24 hours) at 9/13/2023 1737  Last data filed at 9/13/2023 0502  Gross per 24 hour   Intake 1200 ml   Output 3800 ml   Net -2600 ml         Physical Exam  Vitals and nursing note reviewed.   HENT:      Head: Normocephalic and atraumatic.      Mouth/Throat:      Mouth: Mucous membranes are moist.      Pharynx: No oropharyngeal exudate.   Eyes:      Extraocular Movements: Extraocular movements intact.      Pupils: Pupils are equal, round, and reactive to light.   Cardiovascular:      Rate and Rhythm: Bradycardia present.      Heart sounds: No murmur heard.     No gallop.   Pulmonary:      Effort: Pulmonary effort is normal. No respiratory distress.      Breath sounds: No wheezing.   Chest:      Chest wall: No tenderness.   Abdominal:      General: Bowel sounds are normal. There is no distension.      Palpations: Abdomen is soft.      Tenderness: There is no abdominal tenderness. There is no guarding.   Musculoskeletal:         General: Swelling and tenderness present.      Cervical back: No rigidity or tenderness.      Right lower leg: Edema present.      Left lower leg: Edema present.   Skin:     Findings: Erythema present.   Neurological:      Mental Status: She is alert.      Motor: Weakness present.      Gait: Gait abnormal.   Psychiatric:         Thought Content: Thought content normal.              Significant Labs: All pertinent labs within the past 24 hours have been reviewed.  Recent Lab Results         09/13/23  0900   09/13/23  0859        Albumin   2.9       Alkaline Phosphatase   143       ALT   7       Anion Gap   9       AST   10       Baso # 0.01         Basophil % 0.1         BILIRUBIN TOTAL   0.3  Comment: For infants and newborns, interpretation of results should be based  on gestational age, weight and in agreement with clinical  observations.    Premature Infant recommended reference ranges:  Up to 24 hours.............<8.0 mg/dL  Up to 48 hours............<12.0 mg/dL  3-5 days..................<15.0 mg/dL  6-29 days.................<15.0 mg/dL         BUN   6       Calcium   9.0       Chloride   94       CO2   36       Creatinine   0.8       Differential Method Automated         eGFR   >60       Eos # 0.4         Eosinophil % 4.8         Glucose   112       Gran # (ANC) 6.5         Gran % 75.8         Hematocrit 35.7         Hemoglobin 10.9         Immature Grans (Abs) 0.03  Comment: Mild elevation in immature granulocytes is non specific and   can be seen in a variety of conditions including stress response,   acute inflammation, trauma and pregnancy. Correlation with other   laboratory and clinical findings is essential.           Immature Granulocytes 0.3         Lymph # 1.2         Lymph % 13.3         MCH 26.7         MCHC 30.5         MCV 87         Mono # 0.5         Mono % 5.7         MPV 9.6         nRBC 0         Platelets 224         Potassium   3.7       PROTEIN TOTAL   6.3       RBC 4.09         RDW 15.1         Sodium   139       WBC 8.63                 Significant Imaging: I have reviewed all pertinent imaging results/findings within the past 24 hours.

## 2023-09-13 NOTE — PLAN OF CARE
Problem: Adult Inpatient Plan of Care  Goal: Plan of Care Review  Outcome: Ongoing, Progressing  Flowsheets (Taken 9/13/2023 0550)  Plan of Care Reviewed With: patient     Problem: Heart Failure Comorbidity  Goal: Maintenance of Heart Failure Symptom Control  Outcome: Ongoing, Progressing  Intervention: Maintain Heart Failure-Management  Flowsheets (Taken 9/13/2023 0550)  Medication Review/Management: medications reviewed     Problem: Pain Chronic (Persistent) (Comorbidity Management)  Goal: Acceptable Pain Control and Functional Ability  Outcome: Ongoing, Progressing  Intervention: Develop Pain Management Plan  Flowsheets (Taken 9/13/2023 0550)  Pain Management Interventions:   position adjusted   pillow support provided   care clustered   cold applied  Intervention: Manage Persistent Pain  Flowsheets (Taken 9/13/2023 0550)  Medication Review/Management: medications reviewed     Problem: Range of Motion Impairment (Functional Deficit)  Goal: Optimal Range of Motion  Outcome: Ongoing, Progressing  Intervention: Maintain Functional Joint Range Position  Flowsheets (Taken 9/13/2023 0550)  Positioning/Transfer Devices:   pillows   in use  Range of Motion:   active ROM (range of motion) encouraged   ROM (range of motion) performed     Problem: Fall Injury Risk  Goal: Absence of Fall and Fall-Related Injury  Outcome: Ongoing, Progressing  Intervention: Identify and Manage Contributors  Flowsheets (Taken 9/13/2023 0550)  Medication Review/Management: medications reviewed  Intervention: Promote Injury-Free Environment  Flowsheets (Taken 9/13/2023 0550)  Safety Promotion/Fall Prevention:   assistive device/personal item within reach   /camera at bedside

## 2023-09-13 NOTE — PLAN OF CARE
OT evaluation completed, coeval with PT 2/2 patient with pain and low tolerance. Patient's prehospitalization baseline status is having had recurrent hospitalizations, but is usually modified independent with short distance functional mobility with rollator, setup for upper body ADLs, minimal assist for most lower body ADLs and has Home O2. This date on eval patient s/p recent fall, right big toe proximal phalanx fx and per ortho MD is RLE WBAT with hard sole shoe (DAR[employer] shoe provided). Patient limited by pain, requires increased assist for ADLs at bedside context, and requiring up to moderate assist for functional mobility/ transfer to / from bedside commode. Recommend moderate intensity therapy upon medically stable.     Problem: Occupational Therapy  Goal: Occupational Therapy Goal  Description: Goals to be met by: 10/11/2023     Patient will increase functional independence with ADLs by performing:    UE Dressing with Set-up Assistance.  LE Dressing with Minimal Assistance including footwear with adaptive devices as needed.  Toileting from bedside commode or bsc over toilet with Stand-by Assistance for hygiene and clothing management.   Rolling to Bilateral with Modified [place] to support self-sufficient pressure risk reduction.   Toilet transfer to bedside commode with Stand-by Assistance.  Functional mobility to / from bathroom with rolling walker, SBA, and incorporation of fall risk reduction techniques.  Upper extremity exercise program x8 reps per handout, with independence.    Outcome: Ongoing, Progressing

## 2023-09-13 NOTE — PLAN OF CARE
Problem: Physical Therapy  Goal: Physical Therapy Goal  Description: Goals to be met by: 10/13/23     Patient will increase functional independence with mobility by performin. Supine to sit with Stand-by Assistance  2. Sit to supine with Stand-by Assistance  3. Sit to stand transfer with Stand-by Assistance  4. Bed to chair transfer with Stand-by Assistance using Rolling Walker  5. Gait  x 80 feet with Stand-by Assistance using Rolling Walker.   6. Lower extremity exercise program x10 reps per handout, with independence    Outcome: Ongoing, Progressing     PT Eval completed, note to follow. Pt s/p fall and right great toe proximal phalanx fracture. Pt reporting 8/10 R great toe and foot pain limiting functional mobility. Pt requires min-modA with use of RW for sit<>stands and bed <> BSC t/f. Recommending moderate intensity therapy.

## 2023-09-13 NOTE — PT/OT/SLP EVAL
Physical Therapy Evaluation and Treatment    Patient Name:  Tasha Hawley   MRN:  [id number]    Recommendations:     Discharge Recommendations:  (moderate intensity therapy)   Discharge Equipment Recommendations: none   Barriers to discharge:  increased assist required, high fall risk    Assessment:     Tasha Hawley is a 68 y.o. female admitted with a medical diagnosis of Cellulitis.  She presents with the following impairments/functional limitations: gait instability, impaired balance, impaired endurance, weakness, impaired self care skills, impaired functional mobility, decreased safety awareness, decreased lower extremity function, decreased upper extremity function, pain, impaired skin, edema, orthopedic precautions, decreased coordination, decreased ROM, impaired joint extensibility .Pt s/p fall and right great toe proximal phalanx fracture. Pt reporting 8/10 R great toe and foot pain limiting functional mobility. Pt requires min-modA with use of RW for sit<>stands and bed <> BSC t/f. Recommending moderate intensity therapy.     Rehab Prognosis: Good; patient would benefit from acute skilled PT services to address these deficits and reach maximum level of function.    Recent Surgery: * No surgery found *      Plan:     During this hospitalization, patient to be seen 5 x/week to address the identified rehab impairments via gait training, therapeutic activities, therapeutic exercises, neuromuscular re-education and progress toward the following goals:    Plan of Care Expires:  10/13/23    Subjective     Chief Complaint: R foot / great toe pain  Patient/Family Comments/goals: return to PLOF  Pain/Comfort:  Pain Rating 1: 7/10  Location - Side 1: Right  Location 1: first toe  Pain Addressed 1: Reposition, Distraction, Nurse notified, Cessation of Activity, Pre-medicate for activity  Pain Rating Post-Intervention 1: 8/10    Patients cultural, spiritual, Zoroastrian conflicts given the current situation:  no    Living Environment:  Pt lives with spouse in a SSH, ramp to enter, and T/S with TTB  Prior to admission, patients level of function was Mod I for mobility with use of rollator and requiring assist for ADL's such as dressing, bathing, tub/car t/fs; pt uses 5 L O2 and reports chronic fall hx. Equipment used at home: rollator, oxygen, walker, rolling, wheelchair, bedside commode, bath bench.  DME owned (not currently used): none.  Upon discharge, patient will have assistance from spouse.    Objective:     Communicated with nsg prior to session.  Patient found HOB elevated with peripheral IV, oxygen, telemetry (suprapubic catheter, FARHAD system)  upon PT entry to room.    General Precautions: Standard, fall  Orthopedic Precautions:RLE weight bearing as tolerated   Braces:  (hard-soled shoe)  Respiratory Status: Nasal cannula, flow 5 L/min    Exams:  Cognitive Exam:  Patient is oriented to Person, Place, Situation, and month/year with increased time/cueing; impaired memory and insight noted  Pt is hard of hearing  Postural Exam:  Patient presented with the following abnormalities:    -       Rounded shoulders  -       Forward head  -       Posterior pelvic tilt  -       Abnormal trunk flexion  -       Kyphosis  Skin Integrity/Edema:      -       Skin integrity:  redness, flakiness B lower legs and scratch with redness noted to medial L foot and L great toe and mild bruising to R great toe  -       Edema: Severe BLE  RLE ROM: limited by pain, WFL knee flex/ext, active hip flexion and ankle DF ~50% of ROM  RLE Strength: grossly 2/5 R hip flexion, grossly 4-/5  knee ext and ankle NT due to pain  LLE ROM:knee flex/ext WFL, ankle limited by edema - hip/ankle~75% of ROM   LLE Strength: hip/ankle grossly 3-/5 and knee ext grossly 4-/5  ROM/MMT limited by hypersensitivity and pain  Decreased hip flexibility / increased tightness noted     Functional Mobility:  Bed Mobility:     Scooting: contact guard assistance  Supine to  Sit: contact guard assistance, HOB elevated and increased time/effort  Sit to Supine: moderate assistance for BLE  Transfers:     Sit to Stand:  minimum assistance and moderate assistance with rolling walker X 3 trials; VC's for hand placement; 2nd person occasional assist for safety  Bed to BSC: moderate assistance with  rolling walker  using  Step Transfer; VC's for hand placement  Gait: Pt ambulated ~4 ft forward/backward x 2 with RW and ModA; pt demonstrating forward flexed trunk posture, decreased stance time on RLE, and poor carryover to cueing and demonstration for 3 pt gait sequencing to assist with pain tolerance; 2nd person occasional assist for safety      AM-PAC 6 CLICK MOBILITY  Total Score:13       Treatment & Education:  Pt educated on role of PT/POC, WBAT in hard soled shoe per ortho MD, and 3 pt gait sequencing with RW  Regular darco shoe obtained and pt assisted with donning/doffing shoes  Pt transitions with increased time and effort and requires increased time for rest breaks due to pain and limited activity tolerance  Pt reports mild dizziness during session  Pt ambulated 2 gait trials as reported above  2 stands performed from EOB and 1 stand performed from BSC  Bed<>BSC t/f performed as reported above  Pt declined sitting up in chair and returned supine  BLE elevated on pillows and ice pack applied to R foot for pain/edema  Pt declined doffing socks - therapists expressed concern over socks leaving indentation in BLE due to significant edema and pt continued to decline    Patient left HOB elevated with all lines intact, call button in reach, bed alarm on, and nsg notified.    GOALS:   Multidisciplinary Problems       Physical Therapy Goals          Problem: Physical Therapy    Goal Priority Disciplines Outcome Goal Variances Interventions   Physical Therapy Goal     PT, PT/OT Ongoing, Progressing     Description: Goals to be met by: 10/13/23     Patient will increase functional independence  with mobility by performin. Supine to sit with Stand-by Assistance  2. Sit to supine with Stand-by Assistance  3. Sit to stand transfer with Stand-by Assistance  4. Bed to chair transfer with Stand-by Assistance using Rolling Walker  5. Gait  x 80 feet with Stand-by Assistance using Rolling Walker.   6. Lower extremity exercise program x10 reps per handout, with independence                         History:     Past Medical History:   Diagnosis Date    Anxiety     Arthritis     Bilateral lower extremity edema     severe chronic    Carotid artery occlusion     Cataract     CHF (congestive heart failure)     Coronary artery disease     subtotalled LAD with collateral    Depression     Fever blister     Hard of hearing     Hypokalemia 2023    Hyponatremia 2022    Hypothyroid     Iron deficiency anemia     Lumbar radiculopathy     with chronic pain    Ocular migraine     Other emphysema 2023    Renal disorder     Sleep apnea     cpap       Past Surgical History:   Procedure Laterality Date    ADENOIDECTOMY      BACK SURGERY      x 12    CARDIAC CATHETERIZATION  2016    subtotalled LAD with right to left collaterals    CATARACT EXTRACTION W/  INTRAOCULAR LENS IMPLANT Left     Dr Coleman     CYSTOSCOPIC LITHOLAPAXY N/A 2019    Procedure: CYSTOLITHOLAPAXY;  Surgeon: Shireen Mayo MD;  Location: [facility] OR [address] [place] [address];  Service: Urology;  Laterality: N/A;    CYSTOSCOPIC LITHOLAPAXY N/A 9/3/2019    Procedure: CYSTOLITHOLAPAXY;  Surgeon: Shireen Mayo MD;  Location: [facility] OR [address] [place] [address];  Service: Urology;  Laterality: N/A;    CYSTOSCOPY N/A 2021    Procedure: CYSTOSCOPY;  Surgeon: Shireen Mayo MD;  Location: [facility] OR [address] [place] [address];  Service: Urology;  Laterality: N/A;    CYSTOSCOPY  2021    Procedure: CYSTOSCOPY;  Surgeon: Shireen Mayo MD;  Location: [facility] OR [address] [place] [address];  Service: Urology;;    CYSTOSCOPY  2022    Procedure: CYSTOSCOPY;  Surgeon: Shireen Mayo MD;  Location: [facility]  OR 1ST FLR;  Service: Urology;;    CYSTOSCOPY WITH INJECTION OF PERIURETHRAL BULKING AGENT  7/19/2022    Procedure: CYSTOSCOPY, WITH PERIURETHRAL BULKING AGENT INJECTION-MACROPLASTIQUE;  Surgeon: Shireen Mayo MD;  [facility];  Service: Urology;;    CYSTOSCOPY WITH INJECTION OF PERIURETHRAL BULKING AGENT N/A 3/28/2023    Procedure: CYSTOSCOPY, WITH PERIURETHRAL BULKING AGENT INJECTION;  Surgeon: Shireen Mayo MD;  [facility];  Service: Urology;  Laterality: N/A;  [place]    CYSTOSCOPY,WITH BOTULINUM TOXIN INJECTION N/A 12/13/2022    Procedure: CYSTOSCOPY,WITH BOTULINUM TOXIN INJECTION;  Surgeon: Shireen Mayo MD;  [facility];  Service: Urology;  Laterality: N/A;  300 U    CYSTOSCOPY,WITH BOTULINUM TOXIN INJECTION N/A 3/28/2023    Procedure: CYSTOSCOPY,WITH BOTULINUM TOXIN INJECTION;  Surgeon: Shireen Mayo MD;  [facility];  Service: Urology;  Laterality: N/A;  45 MIN.    300 UNITS    ESOPHAGOGASTRODUODENOSCOPY N/A 5/23/2018    Procedure: ESOPHAGOGASTRODUODENOSCOPY (EGD);  Surgeon: Prince Vance MD;  Location: [facility] (4TH FLR);  Service: Endoscopy;  Laterality: N/A;  r/s 'd per Dr. Vance due to family emergency- ER    ESOPHAGOGASTRODUODENOSCOPY N/A 6/23/2023    Procedure: EGD (ESOPHAGOGASTRODUODENOSCOPY);  Surgeon: Juaquin Barry MD;  Location: [facility] (2ND FLR);  Service: Endoscopy;  Laterality: N/A;  Refferal: PRINCE VANCE  tele enc 5/18/23  dx: history of a Nissen fundoplication  Additional Scheduling Information:  On home oxygen 2nd floor for airway protection also with a pain pump elevated BMI close to 40   Prep Gastroparesis   ins    HYSTERECTOMY  1975    endometriosis    INJECTION OF BOTULINUM TOXIN TYPE A  7/13/2021    Procedure: INJECTION, BOTULINUM TOXIN, 200units;  Surgeon: Shireen Mayo MD;  Location: [facility] OR 1ST FLR;  Service: Urology;;    INJECTION OF BOTULINUM TOXIN TYPE A  11/16/2021     Procedure: INJECTION, BOTULINUM TOXIN, 200units;  Surgeon: Shireen Mayo MD;  [facility];  Service: Urology;;    INJECTION OF BOTULINUM TOXIN TYPE A  7/19/2022    Procedure: INJECTION, BOTULINUM TOXIN, 300 units ;  Surgeon: Shireen Mayo MD;  [facility];  Service: Urology;;    INSERTION, SUPRAPUBIC CATHETER N/A 12/13/2022    Procedure: INSERTION, SUPRAPUBIC CATHETER;  Surgeon: Shireen Mayo MD;  Location: [facility] OR [address] [place] [address];  Service: Urology;  Laterality: N/A;  exchange    pain pump placement      SQ Dilaudid Pump managed by Dr. Hillman, Pain Management    REMOVAL OF BONE SPUR OF FOOT Bilateral 9/16/2022    Procedure: EXCISION ARTHRITIC BONE, BILATERAL FOOT;  Surgeon: NICOLA Mcgrath;  Location: [facility];  Service: Podiatry;  Laterality: Bilateral;    REPLACEMENT OF BACLOFEN PUMP N/A 8/2/2023    Procedure: REPLACEMENT, BACLOFEN PUMP;  Surgeon: Smitha Condon MD;  Location: [facility] OR [facility];  Service: Neurosurgery;  Laterality: N/A;  Anes: Gen  Blood: Type & Screen  Pos: Left Lat  Intrathecal Pump  Bed: Reg Reversed  Rad: C-arm  Pump: 40 cc, [employer]    REPLACEMENT OF CATHETER N/A 10/31/2019    Procedure: REPLACEMENT, CATHETER-SUPRAPUBIC;  Surgeon: Shireen Mayo MD;  Location: [facility] OR [address] [place] [address];  Service: Urology;  Laterality: N/A;    SPINAL CORD STIMULATOR REMOVAL      before [place]    SPINE SURGERY  5-13-13    CERVICAL FUSION    TONSILLECTOMY         Time Tracking:     PT Received On: 09/13/23  PT Start Time: 1018     PT Stop Time: 1104  PT Total Time (min): 46 min cotx with OT    Billable Minutes: Evaluation 8, Gait Training 13, and Therapeutic Activity 10      09/13/2023

## 2023-09-13 NOTE — PROGRESS NOTES
[facility] Medicine  Progress Note    Patient Name: Tasha Hawley  MRN: [id number]  Patient Class: OP- Observation   Admission Date: 9/9/2023  Length of Stay: 0 days  Attending Physician: Jon Soto MD  Primary Care Provider: Mesfin Hodges II, MD        Subjective:     Principal Problem:Cellulitis        HPI:  68 year old woman with PMH of HFpEF, CAD, chronic back pain s/p multiple surgeries and now with intrathecal pump, derepression, chronic bilateral venous insufficiency. The patient amublates with a walker and she presented after a machanical fall 2 days ago and her xray was remarkable for fracture of the great toe proximal phalanx. She presented today due to worsening pain and redness in both of her lower ext.       Overview/Hospital Course:  9/12  No new acute event overnight on IV ABx with Unasyn  9/13  Was seen by podiatry      Interval History:   Seen and examined no acute event overnight, no spike of fever    Review of Systems   All other systems reviewed and are negative.    Objective:     Vital Signs (Most Recent):  Temp: 97.1 °F (36.2 °C) (09/13/23 1523)  Pulse: (!) 54 (09/13/23 1523)  Resp: 18 (09/13/23 1546)  BP: (!) 100/49 (09/13/23 1523)  SpO2: 97 % (09/13/23 1523) Vital Signs (24h Range):  Temp:  [93.1 °F (33.9 °C)-98.5 °F (36.9 °C)] 97.1 °F (36.2 °C)  Pulse:  [53-66] 54  Resp:  [16-20] 18  SpO2:  [93 %-99 %] 97 %  BP: ()/(49-55) 100/49     Weight: 103.8 kg (228 lb 13.4 oz)  Body mass index is 38.08 kg/m².    Intake/Output Summary (Last 24 hours) at 9/13/2023 1737  Last data filed at 9/13/2023 0502  Gross per 24 hour   Intake 1200 ml   Output 3800 ml   Net -2600 ml         Physical Exam  Vitals and nursing note reviewed.   HENT:      Head: Normocephalic and atraumatic.      Mouth/Throat:      Mouth: Mucous membranes are moist.      Pharynx: No oropharyngeal exudate.   Eyes:      Extraocular Movements: Extraocular movements intact.      Pupils: Pupils are equal,  round, and reactive to light.   Cardiovascular:      Rate and Rhythm: Bradycardia present.      Heart sounds: No murmur heard.     No gallop.   Pulmonary:      Effort: Pulmonary effort is normal. No respiratory distress.      Breath sounds: No wheezing.   Chest:      Chest wall: No tenderness.   Abdominal:      General: Bowel sounds are normal. There is no distension.      Palpations: Abdomen is soft.      Tenderness: There is no abdominal tenderness. There is no guarding.   Musculoskeletal:         General: Swelling and tenderness present.      Cervical back: No rigidity or tenderness.      Right lower leg: Edema present.      Left lower leg: Edema present.   Skin:     Findings: Erythema present.   Neurological:      Mental Status: She is alert.      Motor: Weakness present.      Gait: Gait abnormal.   Psychiatric:         Thought Content: Thought content normal.             Significant Labs: All pertinent labs within the past 24 hours have been reviewed.  Recent Lab Results         09/13/23  0900   09/13/23  0859        Albumin   2.9       Alkaline Phosphatase   143       ALT   7       Anion Gap   9       AST   10       Baso # 0.01         Basophil % 0.1         BILIRUBIN TOTAL   0.3  Comment: For infants and newborns, interpretation of results should be based  on gestational age, weight and in agreement with clinical  observations.    Premature Infant recommended reference ranges:  Up to 24 hours.............<8.0 mg/dL  Up to 48 hours............<12.0 mg/dL  3-5 days..................<15.0 mg/dL  6-29 days.................<15.0 mg/dL         BUN   6       Calcium   9.0       Chloride   94       CO2   36       Creatinine   0.8       Differential Method Automated         eGFR   >60       Eos # 0.4         Eosinophil % 4.8         Glucose   112       Gran # (ANC) 6.5         Gran % 75.8         Hematocrit 35.7         Hemoglobin 10.9         Immature Grans (Abs) 0.03  Comment: Mild elevation in immature granulocytes  is non specific and   can be seen in a variety of conditions including stress response,   acute inflammation, trauma and pregnancy. Correlation with other   laboratory and clinical findings is essential.           Immature Granulocytes 0.3         Lymph # 1.2         Lymph % 13.3         MCH 26.7         MCHC 30.5         MCV 87         Mono # 0.5         Mono % 5.7         MPV 9.6         nRBC 0         Platelets 224         Potassium   3.7       PROTEIN TOTAL   6.3       RBC 4.09         RDW 15.1         Sodium   139       WBC 8.63                 Significant Imaging: I have reviewed all pertinent imaging results/findings within the past 24 hours.      Assessment/Plan:      * Cellulitis    Non purulent cellulitis in both lower ext   Start Unasyn 1.5 mg q6hr- continue  Can switch to Augmentin upon discharge      Idiopathic hypotension  Respond well to fluid bolus  Repeat 12 lead EKG sinus riley  Cont monitor      Fracture, phalanx, foot  Right ankle x-ray :1. Nondisplaced fracture of the right great toe proximal phalanx.  2. Lucency through the posterior aspect of the right calcaneus, may represent a nondisplaced fracture, correlate with point tenderness.  Right foot x-ray: Fracture of the great toe proximal phalanx.  Right tibia/fibula x-ray: Fracture of the great toe proximal phalanx.  Ortho consult appreciated recommendation for podiatry  Podiatry consult appreciated    Hypokalemia  Resolved  Cont monitor    UTI (urinary tract infection)  Current UTI   UA- positive- with yeast  Diflucan       Recurrent UTI  UA positive with yeast      Bradycardia  Chronic  monitor      Hypothyroidism (acquired)  Pending TSH  Cont home levothyroxine       Congestive heart failure  HFpEF  The patient is not in acute decompensation   She was given Iv lasix in the ED. We will hold her home lasix tonight   Stat home lasix - continue. Give extra dose on 7/11 and switch to IV  KCL 20 daily     VTE Risk Mitigation (From admission,  onward)         Ordered     enoxaparin injection 40 mg  Daily         09/09/23 2305     IP VTE HIGH RISK PATIENT  Once         09/09/23 2305     Place sequential compression device  Until discontinued         09/09/23 2305                Discharge Planning   JACKIE:      Code Status: Full Code   Is the patient medically ready for discharge?:     Reason for patient still in hospital (select all that apply): Patient trending condition and Treatment  Discharge Plan A: Home, Home Health                  Jon Soto MD  Department of Hospital Medicine   [facility] Surg

## 2023-09-13 NOTE — SUBJECTIVE & OBJECTIVE
Scheduled Meds:   ampicillin-sulbactim (UNASYN) IVPB  3 g Intravenous Q6H    aspirin  81 mg Oral Daily    atorvastatin  80 mg Oral Daily    enoxparin  40 mg Subcutaneous Daily    fluconazole  100 mg Oral Daily    FLUoxetine  60 mg Oral Daily    fluticasone propionate  2 spray Each Nostril Daily    furosemide (LASIX) injection  40 mg Intravenous Q12H    levothyroxine  150 mcg Oral Before breakfast    miconazole nitrate 2%   Topical (Top) BID    pantoprazole  40 mg Oral Daily    potassium chloride SA  20 mEq Oral Daily    QUEtiapine  200 mg Oral QHS    senna  3 tablet Oral Nightly    tiotropium bromide  2 puff Inhalation Daily    traZODone  300 mg Oral QHS     Continuous Infusions:   HYDROMORPHONE intrathecal pain pump compound       PRN Meds:dextrose 10%, dextrose 10%, glucagon (human recombinant), glucose, glucose, HYDROcodone-acetaminophen, HYDROmorphone, naloxone, sodium chloride 0.9%    Review of patient's allergies indicates:   Allergen Reactions    (d)-limonene flavor      Other reaction(s): difficult intubation  Other reaction(s): Difficulty breathing    Bactrim [sulfamethoxazole-trimethoprim] Anaphylaxis    Benadryl [diphenhydramine hcl] Shortness Of Breath    Fentanyl Itching, Nausea And Vomiting and Swelling             Imitrex [sumatriptan succinate] Shortness Of Breath    Percocet [oxycodone-acetaminophen] Shortness Of Breath    Topamax [topiramate] Shortness Of Breath    Vancomycin Anaphylaxis     Rash    Butorphanol tartrate     Darvocet a500 [propoxyphene n-acetaminophen]      Other reaction(s): Difficulty breathing    Evening primrose (oenothera biennis)     Lyrica [pregabalin] Other (See Comments)     tremors    White petrolatum-zinc     Zinc oxide-white petrolatum      Other reaction(s): Difficulty breathing    Latex, natural rubber Itching and Rash    Phenytoin Rash and Other (See Comments)     Trouble breathing        Past Medical History:   Diagnosis Date    Anxiety     Arthritis     Bilateral  lower extremity edema     severe chronic    Carotid artery occlusion     Cataract     CHF (congestive heart failure)     Coronary artery disease     subtotalled LAD with collateral    Depression     Fever blister     Hard of hearing     Hypokalemia 01/09/2023    Hyponatremia 02/04/2022    Hypothyroid     Iron deficiency anemia     Lumbar radiculopathy     with chronic pain    Ocular migraine     Other emphysema 05/22/2023    Renal disorder     Sleep apnea     cpap     Past Surgical History:   Procedure Laterality Date    ADENOIDECTOMY      BACK SURGERY      x 12    CARDIAC CATHETERIZATION  2016    subtotalled LAD with right to left collaterals    CATARACT EXTRACTION W/  INTRAOCULAR LENS IMPLANT Left     Dr Coleman     CYSTOSCOPIC LITHOLAPAXY N/A 6/27/2019    Procedure: CYSTOLITHOLAPAXY;  Surgeon: Shireen Mayo MD;  Location: [facility] OR 2ND FLR;  Service: Urology;  Laterality: N/A;    CYSTOSCOPIC LITHOLAPAXY N/A 9/3/2019    Procedure: CYSTOLITHOLAPAXY;  Surgeon: Shireen Mayo MD;  Location: [facility] OR 2ND FLR;  Service: Urology;  Laterality: N/A;    CYSTOSCOPY N/A 7/13/2021    Procedure: CYSTOSCOPY;  Surgeon: Shireen Mayo MD;  Location: [facility] OR 1ST FLR;  Service: Urology;  Laterality: N/A;    CYSTOSCOPY  11/16/2021    Procedure: CYSTOSCOPY;  Surgeon: Shireen Mayo MD;  Location: [facility] OR 1ST FLR;  Service: Urology;;    CYSTOSCOPY  7/19/2022    Procedure: CYSTOSCOPY;  Surgeon: Shireen Mayo MD;  Location: [facility] OR 1ST FLR;  Service: Urology;;    CYSTOSCOPY WITH INJECTION OF PERIURETHRAL BULKING AGENT  7/19/2022    Procedure: CYSTOSCOPY, WITH PERIURETHRAL BULKING AGENT INJECTION-MACROPLASTIQUE;  Surgeon: Shireen Mayo MD;  Location: [facility] OR 1ST FLR;  Service: Urology;;    CYSTOSCOPY WITH INJECTION OF PERIURETHRAL BULKING AGENT N/A 3/28/2023    Procedure: CYSTOSCOPY, WITH PERIURETHRAL BULKING AGENT INJECTION;  Surgeon: Shireen Mayo MD;  Location: [facility] OR 1ST FLR;  Service: Urology;  Laterality:  N/A;  [place]    CYSTOSCOPY,WITH BOTULINUM TOXIN INJECTION N/A 12/13/2022    Procedure: CYSTOSCOPY,WITH BOTULINUM TOXIN INJECTION;  Surgeon: Shireen Mayo MD;  Location: [facility] OR 1ST FLR;  Service: Urology;  Laterality: N/A;  300 U    CYSTOSCOPY,WITH BOTULINUM TOXIN INJECTION N/A 3/28/2023    Procedure: CYSTOSCOPY,WITH BOTULINUM TOXIN INJECTION;  Surgeon: Shireen Mayo MD;  Location: [facility] OR 1ST FLR;  Service: Urology;  Laterality: N/A;  45 MIN.    300 UNITS    ESOPHAGOGASTRODUODENOSCOPY N/A 5/23/2018    Procedure: ESOPHAGOGASTRODUODENOSCOPY (EGD);  Surgeon: Prince Vance MD;  Location: [facility] ENDO (4TH FLR);  Service: Endoscopy;  Laterality: N/A;  r/s 'd per Dr. Vance due to family emergency- ER    ESOPHAGOGASTRODUODENOSCOPY N/A 6/23/2023    Procedure: EGD (ESOPHAGOGASTRODUODENOSCOPY);  Surgeon: Juaquni Barry MD;  Location: [facility] ENDO (2ND FLR);  Service: Endoscopy;  Laterality: N/A;  Refferal: PRINCE VANCE  Order  tele enc 5/18/23  dx: history of a Nissen fundoplication  Additional Scheduling Information:  On home oxygen 2nd floor for airway protection also with a pain pump elevated BMI close to 40   Prep Gastroparesis   ins    HYSTERECTOMY  1975    endometriosis    INJECTION OF BOTULINUM TOXIN TYPE A  7/13/2021    Procedure: INJECTION, BOTULINUM TOXIN, 200units;  Surgeon: Shireen Mayo MD;  [facility];  Service: Urology;;    INJECTION OF BOTULINUM TOXIN TYPE A  11/16/2021    Procedure: INJECTION, BOTULINUM TOXIN, 200units;  Surgeon: Shireen Mayo MD;  [facility];  Service: Urology;;    INJECTION OF BOTULINUM TOXIN TYPE A  7/19/2022    Procedure: INJECTION, BOTULINUM TOXIN, 300 units ;  Surgeon: Shireen Mayo MD;  [facility];  Service: Urology;;    INSERTION, SUPRAPUBIC CATHETER N/A 12/13/2022    Procedure: INSERTION, SUPRAPUBIC CATHETER;  Surgeon: Shireen Mayo MD;  Location: [facility] OR [address] [place] [address];  Service: Urology;  Laterality: N/A;   "exchange    pain pump placement      SQ Dilaudid Pump managed by Dr. Hillman, Pain Management    REMOVAL OF BONE SPUR OF FOOT Bilateral 9/16/2022    Procedure: EXCISION ARTHRITIC BONE, BILATERAL FOOT;  Surgeon: Adam Mcguire DPM;  Location: [facility];  Service: Podiatry;  Laterality: Bilateral;    REPLACEMENT OF BACLOFEN PUMP N/A 8/2/2023    Procedure: REPLACEMENT, BACLOFEN PUMP;  Surgeon: Smitha Condon MD;  Location: [facility] OR 2ND FLR;  Service: Neurosurgery;  Laterality: N/A;  Anes: Gen  Blood: Type & Screen  Pos: Left Lat  Intrathecal Pump  Bed: Reg Reversed  Rad: C-arm  Pump: 40 cc, [employer]tronics    REPLACEMENT OF CATHETER N/A 10/31/2019    Procedure: REPLACEMENT, CATHETER-SUPRAPUBIC;  Surgeon: Shireen Mayo MD;  Location: [facility] OR 1ST [facility];  Service: Urology;  Laterality: N/A;    SPINAL CORD STIMULATOR REMOVAL      before [place]    SPINE SURGERY  5-13-13    CERVICAL FUSION    TONSILLECTOMY         Family History       Problem Relation (Age of Onset)    Cancer Mother (55), Father    Cirrhosis Paternal Aunt, Paternal Uncle    Colon cancer Maternal Uncle    Esophageal cancer Father    Heart disease Maternal Uncle    Liver disease Paternal Aunt, Paternal Uncle    No Known Problems Brother, Brother, Sister, Maternal Aunt, Maternal Grandfather, Paternal Grandmother, Paternal Grandfather    Parkinsonism Maternal Grandmother    Tremor Maternal Grandmother          Tobacco Use    Smoking status: Never    Smokeless tobacco: Never   Substance and Sexual Activity    Alcohol use: Never    Drug use: Yes     Types: Marijuana     Comment: "medical marijuana gummies"    Sexual activity: Not on file     Review of Systems   Constitutional:  Negative for chills, diaphoresis, fatigue and fever.   Respiratory:  Negative for cough and shortness of breath.    Cardiovascular:  Positive for leg swelling. Negative for chest pain.   Gastrointestinal:  Negative for nausea and vomiting.   Musculoskeletal:  Negative for arthralgias, " "back pain, gait problem and joint swelling.   Skin:  Negative for pallor and rash.   Neurological:  Negative for tremors and speech difficulty.   Psychiatric/Behavioral:  Negative for agitation. The patient is not nervous/anxious.      Objective:     Vital Signs (Most Recent):  Temp: 97.1 °F (36.2 °C) (09/13/23 1523)  Pulse: (!) 54 (09/13/23 1523)  Resp: 20 (09/13/23 1523)  BP: (!) 100/49 (09/13/23 1523)  SpO2: 97 % (09/13/23 1523) Vital Signs (24h Range):  Temp:  [93.1 °F (33.9 °C)-98.5 °F (36.9 °C)] 97.1 °F (36.2 °C)  Pulse:  [53-66] 54  Resp:  [16-20] 20  SpO2:  [93 %-99 %] 97 %  BP: ()/(49-55) 100/49     Weight: 103.8 kg (228 lb 13.4 oz)  Body mass index is 38.08 kg/m².    Foot Exam    General  General Appearance: appears stated age and healthy   Orientation: alert and oriented to person, place, and time   Affect: appropriate       Right Foot/Ankle     Inspection and Palpation  Ecchymosis: none  Tenderness: bony tenderness   Skin Exam: skin intact;     Neurovascular  Dorsalis pedis: 1+  Posterior tibial: 1+      Left Foot/Ankle      Inspection and Palpation  Ecchymosis: none  Tenderness: bony tenderness   Swelling: none   Skin Exam: skin intact;     Neurovascular  Dorsalis pedis: 1+  Posterior tibial: 1+          Laboratory:  A1C:   Recent Labs   Lab 07/09/23  0347   HGBA1C 5.2     Blood Cultures: No results for input(s): "LABBLOO" in the last 48 hours.  CBC:   Recent Labs   Lab 09/13/23  0900   WBC 8.63   RBC 4.09   HGB 10.9*   HCT 35.7*      MCV 87   MCH 26.7*   MCHC 30.5*     CMP:   Recent Labs   Lab 09/13/23  0859   *   CALCIUM 9.0   ALBUMIN 2.9*   PROT 6.3      K 3.7   CO2 36*   CL 94*   BUN 6*   CREATININE 0.8   ALKPHOS 143*   ALT 7*   AST 10   BILITOT 0.3     CRP: No results for input(s): "CRP" in the last 168 hours.  ESR: No results for input(s): "SEDRATE" in the last 168 hours.    Diagnostic Results:  X-Ray: I have reviewed all pertinent results/findings within the past 24 " hours.  Minimally displaced fracture to proximal phalanx of hallux and possible fracture to calcaneus    Clinical Findings:  Tenderness to right 1st MTP and hallux. Tenderness to calcaneus and with calcaneal squeeze

## 2023-09-13 NOTE — PLAN OF CARE
1235  Message received from [place] ([phone]) [facility] stating that the patient was previously discharged.       Will continue to follow.

## 2023-09-13 NOTE — PT/OT/SLP EVAL
Occupational Therapy   Evaluation    Name: Tasha Hawley  MRN: [id number]  Admitting Diagnosis: Cellulitis  Recent Surgery: * No surgery found *      Recommendations:     Discharge Recommendations: other (see comments) (moderate intensity therapy)  Discharge Equipment Recommendations:  none  Barriers to discharge:  Other (Comment) (pain; decreased safety with mobility)    Assessment:     Tasha Hawley is a 68 y.o. female with a medical diagnosis of Cellulitis.  She presents with ..The primary encounter diagnosis was Cellulitis of lower extremity, unspecified laterality. Diagnoses of Shortness of breath, Lower extremity edema, SOB (shortness of breath), Chest pain, Pain of right heel, and Displaced fracture of proximal phalanx of right great toe, initial encounter for closed fracture were also pertinent to this visit.  . Performance deficits affecting function: weakness, impaired endurance, impaired cognition, impaired sensation, decreased coordination, impaired self care skills, impaired functional mobility, decreased lower extremity function, impaired skin, gait instability, edema, decreased safety awareness, impaired balance, pain, impaired cardiopulmonary response to activity.      OT evaluation completed, coeval with PT 2/2 patient with pain and low tolerance. Patient's prehospitalization baseline status is having had recurrent hospitalizations, but is usually modified independent with short distance functional mobility with rollator, setup for upper body ADLs, minimal assist for most lower body ADLs and has Home O2. This date on eval patient s/p recent fall, right big toe proximal phalanx fx and per ortho MD is RLE WBAT with hard sole shoe (DARCO shoe provided). Patient limited by pain, requires increased assist for ADLs at bedside context, and requiring up to moderate assist for functional mobility/ transfer to / from bedside commode. Recommend moderate intensity therapy upon medically stable.     Rehab  Prognosis: Fair; patient would benefit from acute skilled OT services to address these deficits and reach maximum level of function.       Plan:     Patient to be seen 3 x/week to address the above listed problems via self-care/home management, therapeutic activities, therapeutic exercises  Plan of Care Expires: 10/11/23  Plan of Care Reviewed with: patient    Subjective     Chief Complaint: pain in R great toe (also in back)  Patient/Family Comments/goals: indicates wants to get home    Occupational Profile:  Living Environment: resides with spouse in a single story home, ramp entrance, tub shower with tub transfer bench  Previous level of function: Patient's prehospitalization baseline status is having had recurrent hospitalizations, but is usually modified independent with short distance functional mobility with rollator, setup for upper body ADLs, minimal assist for most lower body ADLs and has Home O2. Relies on spouse for assist in driving/transportation, IADLs/ home management. Chronic fall hx.  Equipment Used at Home: rollator, other (see comments), cane, straight, oxygen, bedside commode, wheelchair (tub transfer bench)  Assistance upon Discharge: spouse    Pain/Comfort:  Pain Rating 1: 7/10 (R big toe)  Pain Addressed 1: Reposition, Distraction, Cessation of Activity, Nurse notified  Pain Rating Post-Intervention 1: other (see comments) (8/10 R big toe)    Patients cultural, spiritual, Religion conflicts given the current situation:      Objective:     Communicated with:  nursing prior to session.  Patient found HOB elevated with bed alarm, Other (comments), telemetry, peripheral IV, oxygen (telesitter; suprapubic catheter; intrathecal pain pump) upon OT entry to room.    General Precautions: Standard, fall  Orthopedic Precautions:  (RLE WBAT in hard sole shoe (DARCO))  Braces:  (hard sole shoe (DARCO) provided)  Respiratory Status: Nasal cannula, flow 5 L/min    Occupational Performance:    Bed  Mobility:    Patient completed Scooting/Bridging with contact guard assistance  Patient completed Supine to Sit with CGA, increased time, cues to slow movements  Patient completed Sit to Supine with moderate assist, increased time    Functional Mobility/Transfers:  Patient completed Sit <> Stand Transfer with moderate assist x2 trials, minimal assist x1 trial  with  rolling walker, moderate cues for sequencing and forward weightshifting   Patient completed Toilet Transfer Step Transfer technique with moderate assistance with  second person present for safety (handsoff) / management of O2 / IV, and use of rolling walker with moderate cues to instruct on full turn (vs early descent prior to full turn)  Functional Mobility: Minimal steps forward backward with rolling walker and lifting assist of PT and intermittent support physically by OT . Max coaching with poor reciprocation for step sequence cue to mitigate pain (ie. RLE forward, LLE forward, walker forward)    Activities of Daily Living:  Grooming: SBA; increased time / effort; sitting bedside with items in reach  Upper Body Dressing: minimum assistance ; IV  Lower Body Dressing: maximal assistance ; instructed on weightshifting laterally while eob; decreased proximal BLE strength impacts performance  Toileting: suprapubic catheter (chronic); infer for bowel movement - max assist from context of commode at this time    Cognitive/Visual Perceptual:  Cognitive/Psychosocial Skills:     -       Oriented to: Person, Place, Situation, and initially reported 1923 then corrected to 2023   -       Follows Commands/attention:Follows two-step commands  -       Memory: Brief Interview for Mental Status (BIMS) performed on this date with score of 13/15 (score of 00-07: severe impairment; score of 08-12: moderate impairment; score of 13-15 is cognitively intact).  mild confusion noted amplified by fatigue  -       Safety awareness/insight to disability: fair   -        Mood/Affect/Coping skills/emotional control: Cooperative and Lethargic  Visual/Perceptual:      -WFL     Physical Exam:  Balance:    -       [facility] Standing Balance Scale: 1  Postural examination/scapula alignment:    -       Rounded shoulders  -       Posterior pelvic tilt  -       Abnormal trunk flexion  -       Scoliosis  Skin integrity: Distal BLE with flakiness/redness; L foot / toe with bruising ; scratch noted to medial L foot (recommend podiatry consult)  Edema:  Severe BLE  Sensation:    -       Impaired  light/touch BLE  Upper Extremity Range of Motion:     -       Right Upper Extremity: WFL  -       Left Upper Extremity: WFL  Upper Extremity Strength:    -       Right Upper Extremity: grossly WFL; poor muscular endurance  -       Left Upper Extremity: grossly WFL; poor muscular endurance    AMPAC 6 Click ADL:  AMPAC Total Score: 14    Treatment & Education:  Patient educated on role  of OT / POC, discussion / instruction on recommendations from ortho MD (ie. WBAT RLE with hard sole shoe), application of DARCO and recommendation for velcro protection techniques. Instructed for slow transition movements. Eob instructed for 2 x5 BUE shoulder retraction squeezes in prep for above. Guided for hand placement / feet position technique for functional transitions. Guided for x2 stands from bed. Instructed in modified ADLs bedside. Bedside commode adjusted for patient's height needs. Transfer training performed as above. Educated patient on recommendation for daily activity / out of bed, but declines. Patient with firm declination to take off socks despite education on risk of socks leaving indentation on B ankles. Returned to supine; BLE elevated; ice pack placed to R foot.       Patient left HOB elevated with all lines intact, call button in reach, bed alarm on, and nursing notified    GOALS:   Multidisciplinary Problems       Occupational Therapy Goals          Problem: Occupational Therapy    Goal  Priority Disciplines Outcome Interventions   Occupational Therapy Goal     OT, PT/OT Ongoing, Progressing    Description: Goals to be met by: 10/11/2023     Patient will increase functional independence with ADLs by performing:    UE Dressing with Set-up Assistance.  LE Dressing with Minimal Assistance including footwear with adaptive devices as needed.  Toileting from bedside commode or bsc over toilet with Stand-by Assistance for hygiene and clothing management.   Rolling to Bilateral with Modified [place] to support self-sufficient pressure risk reduction.   Toilet transfer to bedside commode with Stand-by Assistance.  Functional mobility to / from bathroom with rolling walker, SBA, and incorporation of fall risk reduction techniques.  Upper extremity exercise program x8 reps per handout, with independence.                         History:     Past Medical History:   Diagnosis Date    Anxiety     Arthritis     Bilateral lower extremity edema     severe chronic    Carotid artery occlusion     Cataract     CHF (congestive heart failure)     Coronary artery disease     subtotalled LAD with collateral    Depression     Fever blister     Hard of hearing     Hypokalemia 01/09/2023    Hyponatremia 02/04/2022    Hypothyroid     Iron deficiency anemia     Lumbar radiculopathy     with chronic pain    Ocular migraine     Other emphysema 05/22/2023    Renal disorder     Sleep apnea     cpap         Past Surgical History:   Procedure Laterality Date    ADENOIDECTOMY      BACK SURGERY      x 12    CARDIAC CATHETERIZATION  2016    subtotalled LAD with right to left collaterals    CATARACT EXTRACTION W/  INTRAOCULAR LENS IMPLANT Left     Dr Coleman     CYSTOSCOPIC LITHOLAPAXY N/A 6/27/2019    Procedure: CYSTOLITHOLAPAXY;  Surgeon: Shireen Mayo MD;  Location: [facility] OR [address] [place] [address];  Service: Urology;  Laterality: N/A;    CYSTOSCOPIC LITHOLAPAXY N/A 9/3/2019    Procedure: CYSTOLITHOLAPAXY;  Surgeon: Shireen Mayo MD;   Location: [facility] OR 2ND FLR;  Service: Urology;  Laterality: N/A;    CYSTOSCOPY N/A 7/13/2021    Procedure: CYSTOSCOPY;  Surgeon: Shireen Mayo MD;  Location: [facility] OR 1ST FLR;  Service: Urology;  Laterality: N/A;    CYSTOSCOPY  11/16/2021    Procedure: CYSTOSCOPY;  Surgeon: Shireen Mayo MD;  Location: [facility] OR [facility] FLR;  Service: Urology;;    CYSTOSCOPY  7/19/2022    Procedure: CYSTOSCOPY;  Surgeon: Shireen Mayo MD;  Location: [facility] OR 1ST FLR;  Service: Urology;;    CYSTOSCOPY WITH INJECTION OF PERIURETHRAL BULKING AGENT  7/19/2022    Procedure: CYSTOSCOPY, WITH PERIURETHRAL BULKING AGENT INJECTION-MACROPLASTIQUE;  Surgeon: Shireen Mayo MD;  [facility];  Service: Urology;;    CYSTOSCOPY WITH INJECTION OF PERIURETHRAL BULKING AGENT N/A 3/28/2023    Procedure: CYSTOSCOPY, WITH PERIURETHRAL BULKING AGENT INJECTION;  Surgeon: Shireen Mayo MD;  [facility];  Service: Urology;  Laterality: N/A;  [place]    CYSTOSCOPY,WITH BOTULINUM TOXIN INJECTION N/A 12/13/2022    Procedure: CYSTOSCOPY,WITH BOTULINUM TOXIN INJECTION;  Surgeon: Shireen Mayo MD;  [facility];  Service: Urology;  Laterality: N/A;  300 U    CYSTOSCOPY,WITH BOTULINUM TOXIN INJECTION N/A 3/28/2023    Procedure: CYSTOSCOPY,WITH BOTULINUM TOXIN INJECTION;  Surgeon: Shireen Mayo MD;  [facility];  Service: Urology;  Laterality: N/A;  45 MIN.    300 UNITS    ESOPHAGOGASTRODUODENOSCOPY N/A 5/23/2018    Procedure: ESOPHAGOGASTRODUODENOSCOPY (EGD);  Surgeon: Prince Vance MD;  Location: [facility] (4TH FLR);  Service: Endoscopy;  Laterality: N/A;  r/s 'd per Dr. Vance due to family emergency- ER    ESOPHAGOGASTRODUODENOSCOPY N/A 6/23/2023    Procedure: EGD (ESOPHAGOGASTRODUODENOSCOPY);  Surgeon: Juaquin Barry MD;  Location: [facility] ([address] [place] [address]);  Service: Endoscopy;  Laterality: N/A;  Refferal: PRINCE VANCE  tele [facility] 5/18/23  dx: history of a Nissen  fundoplication  Additional Scheduling Information:  On home oxygen 2nd floor for airway protection also with a pain pump elevated BMI close to 40   Prep Gastroparesis   ins    HYSTERECTOMY  1975    endometriosis    INJECTION OF BOTULINUM TOXIN TYPE A  7/13/2021    Procedure: INJECTION, BOTULINUM TOXIN, 200units;  Surgeon: Shireen Mayo MD;  Location: [facility] OR [address] [place] [address];  Service: Urology;;    INJECTION OF BOTULINUM TOXIN TYPE A  11/16/2021    Procedure: INJECTION, BOTULINUM TOXIN, 200units;  Surgeon: Shireen Mayo MD;  [facility]  Service: Urology;;    INJECTION OF BOTULINUM TOXIN TYPE A  7/19/2022    Procedure: INJECTION, BOTULINUM TOXIN, 300 units ;  Surgeon: Shireen Mayo MD;  Location: [facility] OR [address] [place] [address];  Service: Urology;;    INSERTION, SUPRAPUBIC CATHETER N/A 12/13/2022    Procedure: INSERTION, SUPRAPUBIC CATHETER;  Surgeon: Shireen Mayo MD;  Location: [facility] OR [address] [place] [address];  Service: Urology;  Laterality: N/A;  exchange    pain pump placement      SQ Dilaudid Pump managed by Dr. Hillman, Pain Management    REMOVAL OF BONE SPUR OF FOOT Bilateral 9/16/2022    Procedure: EXCISION ARTHRITIC BONE, BILATERAL FOOT;  Surgeon: NICOLA Mcgrath;  Location: [facility] OR;  Service: Podiatry;  Laterality: Bilateral;    REPLACEMENT OF BACLOFEN PUMP N/A 8/2/2023    Procedure: REPLACEMENT, BACLOFEN PUMP;  Surgeon: Smitha Condon MD;  Location: [facility] OR [address] [place] [address];  Service: Neurosurgery;  Laterality: N/A;  Anes: Gen  Blood: Type & Screen  Pos: Left Lat  Intrathecal Pump  Bed: Reg Reversed  Rad: C-arm  Pump: 40 cc, [employer]    REPLACEMENT OF CATHETER N/A 10/31/2019    Procedure: REPLACEMENT, CATHETER-SUPRAPUBIC;  Surgeon: Shireen Mayo MD;  Location: [facility] OR [address] [place] [address];  Service: Urology;  Laterality: N/A;    SPINAL CORD STIMULATOR REMOVAL      before [place]    SPINE SURGERY  5-13-13    CERVICAL FUSION    TONSILLECTOMY         Time Tracking:     OT Date of Treatment: 09/13/23  OT Start Time:  1020  OT Stop Time: 1103  OT Total Time (min): 43 min total time; cotx PT    Billable Minutes:Evaluation 8 min  Self Care/Home Management 15 min  Therapeutic Activity 20 min    9/13/2023

## 2023-09-13 NOTE — ASSESSMENT & PLAN NOTE
Right ankle x-ray :1. Nondisplaced fracture of the right great toe proximal phalanx.  2. Lucency through the posterior aspect of the right calcaneus, may represent a nondisplaced fracture, correlate with point tenderness.  Right foot x-ray: Fracture of the great toe proximal phalanx.  Right tibia/fibula x-ray: Fracture of the great toe proximal phalanx.  Ortho consult appreciated recommendation for podiatry  Podiatry consult appreciated

## 2023-09-13 NOTE — HPI
Pt is a 67 y/o female  has a past medical history of Anxiety, Arthritis, Bilateral lower extremity edema, Carotid artery occlusion, Cataract, CHF (congestive heart failure), Coronary artery disease, Depression, Fever blister, Hard of hearing, Hypokalemia, Hyponatremia, Hypothyroid, Iron deficiency anemia, Lumbar radiculopathy, Ocular migraine, Other emphysema, Renal disorder, and Sleep apnea. Admitted for cellulitis. History of fall and injured her great toe, xray with minimally displaced fracture. Relates to bilateral foot pain. No further pedal complaints

## 2023-09-13 NOTE — PROGRESS NOTES
NURSE PROACTIVE ROUNDING NOTE       Time of Visit:     Admit Date: 2023  LOS: 0  Code Status: Full Code   Date of Visit: 2023  : 1955  Age: 68 y.o.  Sex: female  Race: White  Bed: K505/K505 A:   MRN: [id number]  Was the patient discharged from an ICU this admission? No   Was the patient discharged from a PACU within last 24 hours? No   Did the patient receive conscious sedation/general anesthesia in last 24 hours? No   Was the patient in the ED within the past 24 hours? No   Was the patient on NIPPV within the past 24 hours? No   Attending Physician: Jon Soto MD  Primary Service: Family Medicine   Time spent at the bedside: 15 -30 min    SITUATION    Notified by charge RN via phone call  Reason for alert: Peripheral IV placement    Diagnosis: Cellulitis   has a past medical history of Anxiety, Arthritis, Bilateral lower extremity edema, Carotid artery occlusion, Cataract, CHF (congestive heart failure), Coronary artery disease, Depression, Fever blister, Hard of hearing, Hypokalemia, Hyponatremia, Hypothyroid, Iron deficiency anemia, Lumbar radiculopathy, Ocular migraine, Other emphysema, Renal disorder, and Sleep apnea.    Last Vitals:  Temp: 97.7 °F (36.5 °C) (2049)  Pulse: 63 (2237)  Resp: 20 (2237)  BP: 101/51 (2237)  SpO2: 99 % (2237)    24 Hour Vitals Range:  Temp:  [93.1 °F (33.9 °C)-98.3 °F (36.8 °C)]   Pulse:  [53-66]   Resp:  [16-20]   BP: ()/(49-59)   SpO2:  [95 %-100 %]     Clinical Issues:  Cellulitis    ASSESSMENT/INTERVENTIONS    No IV access. Right forearm 20 IV placed.    RECOMMENDATIONS  N/a    Discussed plan of care with  n/a    PROVIDER ESCALATION    Physician escalation: No    Orders received and case discussed with NA.    Disposition:Remain in room 505    FOLLOW UP    Call back the Rapid Response NurseJohn at [phone] for additional questions or concerns.

## 2023-09-13 NOTE — PLAN OF CARE
Pt was accepted by [facility] for SNF. However, pt has changed her mind and refuse SNF placement, requesting to be discharged to home with  for nurse and PT. Her spouse will transport her home when discharged. Ortho was consulted and recommended Hard Sole Shoe, shoe was given to pt by PT when she was evaluated and treated today. Also I requested Podiatry f/u per Ortho recommendation. Will continue to follow care for discharge needs.    Future Appointments   Date Time Provider Department Center   9/14/2023  1:00 PM Thang Ordoñez CCC-SLP VE OP [facility] PT   9/19/2023 11:00 AM Kaitlynn Hoyt MD [facility] RHEUM [facility] Ort   9/21/2023  1:00 PM Thang Ordoñez CCC-SLP VE OP [facility] PT   9/22/2023  9:20 AM Mesfin Hodges II, MD [facility] IM [facility] PCW   9/22/2023  9:40 AM LAB, SPECIMEN [facility] LAB [facility] [place]   9/26/2023  9:30 AM Amanda Head, NP [facility] HEM ONC [facility]   9/26/2023  1:30 PM Smitha Condon MD [facility] NEUROS8 [facility]   9/28/2023  3:30 PM Thang Ordoñez CCC-SLP [facility] OP [facility] PT   10/4/2023  1:30 PM Nick Chilel MD [facility] PULMSVC [facility]   10/27/2023 10:00 AM Juan A Madera MD [facility] PSYCH [facility]        09/13/23 1250   Discharge Planning   Assessment Type Discharge Planning Brief Assessment   Resource/Environmental Concerns none   Support Systems Spouse/significant other   Equipment Currently Used at Home wheelchair;rollator;oxygen;cane, straight;bedside commode   Current Living Arrangements home   Patient/Family Anticipates Transition to home;home with family   Patient/Family Anticipated Services at Transition home health care   DME Needed Upon Discharge  other (see comments)  (Hard Sole Shoe was given to pt at bedside by PT per Ortho recommendation)   Discharge Plan A Home;Home Health

## 2023-09-14 VITALS
RESPIRATION RATE: 20 BRPM | HEART RATE: 51 BPM | TEMPERATURE: 98 F | OXYGEN SATURATION: 100 % | HEIGHT: 65 IN | WEIGHT: 228.63 LBS | BODY MASS INDEX: 38.09 KG/M2 | DIASTOLIC BLOOD PRESSURE: 60 MMHG | SYSTOLIC BLOOD PRESSURE: 128 MMHG

## 2023-09-14 LAB
ANION GAP SERPL CALC-SCNC: 10 MMOL/L (ref 8–16)
BASOPHILS # BLD AUTO: 0.01 K/UL (ref 0–0.2)
BASOPHILS NFR BLD: 0.2 % (ref 0–1.9)
BUN SERPL-MCNC: 7 MG/DL (ref 8–23)
CALCIUM SERPL-MCNC: 9 MG/DL (ref 8.7–10.5)
CHLORIDE SERPL-SCNC: 99 MMOL/L (ref 95–110)
CO2 SERPL-SCNC: 33 MMOL/L (ref 23–29)
CREAT SERPL-MCNC: 0.8 MG/DL (ref 0.5–1.4)
DIFFERENTIAL METHOD: ABNORMAL
EOSINOPHIL # BLD AUTO: 0.5 K/UL (ref 0–0.5)
EOSINOPHIL NFR BLD: 10.1 % (ref 0–8)
ERYTHROCYTE [DISTWIDTH] IN BLOOD BY AUTOMATED COUNT: 14.8 % (ref 11.5–14.5)
EST. GFR  (NO RACE VARIABLE): >60 ML/MIN/1.73 M^2
GLUCOSE SERPL-MCNC: 141 MG/DL (ref 70–110)
HCT VFR BLD AUTO: 37.6 % (ref 37–48.5)
HGB BLD-MCNC: 11.3 G/DL (ref 12–16)
IMM GRANULOCYTES # BLD AUTO: 0.01 K/UL (ref 0–0.04)
IMM GRANULOCYTES NFR BLD AUTO: 0.2 % (ref 0–0.5)
LYMPHOCYTES # BLD AUTO: 1.1 K/UL (ref 1–4.8)
LYMPHOCYTES NFR BLD: 24.6 % (ref 18–48)
MCH RBC QN AUTO: 26.6 PG (ref 27–31)
MCHC RBC AUTO-ENTMCNC: 30.1 G/DL (ref 32–36)
MCV RBC AUTO: 89 FL (ref 82–98)
MONOCYTES # BLD AUTO: 0.4 K/UL (ref 0.3–1)
MONOCYTES NFR BLD: 8.1 % (ref 4–15)
NEUTROPHILS # BLD AUTO: 2.6 K/UL (ref 1.8–7.7)
NEUTROPHILS NFR BLD: 56.8 % (ref 38–73)
NRBC BLD-RTO: 0 /100 WBC
PLATELET # BLD AUTO: 210 K/UL (ref 150–450)
PMV BLD AUTO: 9.7 FL (ref 9.2–12.9)
POTASSIUM SERPL-SCNC: 4 MMOL/L (ref 3.5–5.1)
RBC # BLD AUTO: 4.25 M/UL (ref 4–5.4)
SODIUM SERPL-SCNC: 142 MMOL/L (ref 136–145)
WBC # BLD AUTO: 4.56 K/UL (ref 3.9–12.7)

## 2023-09-14 PROCEDURE — 85025 COMPLETE CBC W/AUTO DIFF WBC: CPT | Performed by: FAMILY MEDICINE

## 2023-09-14 PROCEDURE — 63600175 PHARM REV CODE 636 W HCPCS: Performed by: FAMILY MEDICINE

## 2023-09-14 PROCEDURE — 25000003 PHARM REV CODE 250: Performed by: STUDENT IN AN ORGANIZED HEALTH CARE EDUCATION/TRAINING PROGRAM

## 2023-09-14 PROCEDURE — 27000221 HC OXYGEN, UP TO 24 HOURS

## 2023-09-14 PROCEDURE — 36415 COLL VENOUS BLD VENIPUNCTURE: CPT | Performed by: FAMILY MEDICINE

## 2023-09-14 PROCEDURE — 25000003 PHARM REV CODE 250: Performed by: FAMILY MEDICINE

## 2023-09-14 PROCEDURE — G0378 HOSPITAL OBSERVATION PER HR: HCPCS

## 2023-09-14 PROCEDURE — 63600175 PHARM REV CODE 636 W HCPCS: Performed by: STUDENT IN AN ORGANIZED HEALTH CARE EDUCATION/TRAINING PROGRAM

## 2023-09-14 PROCEDURE — 63700000 PHARM REV CODE 250 ALT 637 W/O HCPCS: Performed by: FAMILY MEDICINE

## 2023-09-14 PROCEDURE — 96376 TX/PRO/DX INJ SAME DRUG ADON: CPT

## 2023-09-14 PROCEDURE — 97116 GAIT TRAINING THERAPY: CPT | Mod: CQ

## 2023-09-14 PROCEDURE — 96366 THER/PROPH/DIAG IV INF ADDON: CPT

## 2023-09-14 PROCEDURE — 80048 BASIC METABOLIC PNL TOTAL CA: CPT | Performed by: FAMILY MEDICINE

## 2023-09-14 PROCEDURE — 99900035 HC TECH TIME PER 15 MIN (STAT)

## 2023-09-14 PROCEDURE — 94761 N-INVAS EAR/PLS OXIMETRY MLT: CPT

## 2023-09-14 PROCEDURE — 97110 THERAPEUTIC EXERCISES: CPT | Mod: CQ

## 2023-09-14 PROCEDURE — 94640 AIRWAY INHALATION TREATMENT: CPT

## 2023-09-14 RX ORDER — CLINDAMYCIN HYDROCHLORIDE 300 MG/1
300 CAPSULE ORAL EVERY 8 HOURS
Qty: 30 CAPSULE | Refills: 0 | Status: SHIPPED | OUTPATIENT
Start: 2023-09-14 | End: 2024-01-05

## 2023-09-14 RX ORDER — SODIUM CHLORIDE 9 MG/ML
INJECTION, SOLUTION INTRAVENOUS
Status: DISCONTINUED | OUTPATIENT
Start: 2023-09-14 | End: 2023-09-14 | Stop reason: HOSPADM

## 2023-09-14 RX ADMIN — FLUOXETINE 60 MG: 20 CAPSULE ORAL at 08:09

## 2023-09-14 RX ADMIN — AMPICILLIN SODIUM AND SULBACTAM SODIUM 3 G: 2; 1 INJECTION, POWDER, FOR SOLUTION INTRAMUSCULAR; INTRAVENOUS at 06:09

## 2023-09-14 RX ADMIN — HYDROMORPHONE HYDROCHLORIDE 0.5 MG: 1 INJECTION, SOLUTION INTRAMUSCULAR; INTRAVENOUS; SUBCUTANEOUS at 06:09

## 2023-09-14 RX ADMIN — SODIUM CHLORIDE: 9 INJECTION, SOLUTION INTRAVENOUS at 12:09

## 2023-09-14 RX ADMIN — HYDROCODONE BITARTRATE AND ACETAMINOPHEN 1 TABLET: 10; 325 TABLET ORAL at 10:09

## 2023-09-14 RX ADMIN — AMPICILLIN SODIUM AND SULBACTAM SODIUM 3 G: 2; 1 INJECTION, POWDER, FOR SOLUTION INTRAMUSCULAR; INTRAVENOUS at 12:09

## 2023-09-14 RX ADMIN — FLUCONAZOLE 100 MG: 100 TABLET ORAL at 08:09

## 2023-09-14 RX ADMIN — ASPIRIN 81 MG: 81 TABLET, COATED ORAL at 08:09

## 2023-09-14 RX ADMIN — MICONAZOLE NITRATE: 20 OINTMENT TOPICAL at 08:09

## 2023-09-14 RX ADMIN — ATORVASTATIN CALCIUM 80 MG: 40 TABLET, FILM COATED ORAL at 08:09

## 2023-09-14 RX ADMIN — POTASSIUM CHLORIDE 20 MEQ: 1500 TABLET, EXTENDED RELEASE ORAL at 08:09

## 2023-09-14 RX ADMIN — FAMOTIDINE 20 MG: 20 TABLET ORAL at 08:09

## 2023-09-14 RX ADMIN — TIOTROPIUM BROMIDE INHALATION SPRAY 2 PUFF: 3.12 SPRAY, METERED RESPIRATORY (INHALATION) at 07:09

## 2023-09-14 RX ADMIN — FUROSEMIDE 40 MG: 10 INJECTION, SOLUTION INTRAMUSCULAR; INTRAVENOUS at 10:09

## 2023-09-14 RX ADMIN — FLUTICASONE PROPIONATE 100 MCG: 50 SPRAY, METERED NASAL at 08:09

## 2023-09-14 RX ADMIN — LEVOTHYROXINE SODIUM 150 MCG: 75 TABLET ORAL at 06:09

## 2023-09-14 NOTE — PROGRESS NOTES
Discharge orders noted. Additional clinical references attached. Patient's discharge instructions given by bedside RN and reviewed via this VN.  Education provided on new medication, diagnosis, and follow-up appointments to patient and her spouse.  Teach back method used. Patient verbalized understanding. All questions answered. Transport to [facility] requested. Floor nurse notified.      09/14/23 9604   AVS Confirmation   Discharge instructions and AVS given to and reviewed with patient and/or significant other. Yes

## 2023-09-14 NOTE — PT/OT/SLP PROGRESS
Physical Therapy Treatment    Patient Name:  Tasha Hawley   MRN:  [id number]    Recommendations:     Discharge Recommendations:  (moderate intensity therapy)  Discharge Equipment Recommendations: none  Barriers to discharge:  decreased mobility,strength and endurance    Assessment:     Tasha Hawley is a 68 y.o. female admitted with a medical diagnosis of Cellulitis.  She presents with the following impairments/functional limitations: weakness, impaired endurance, impaired functional mobility, gait instability, impaired balance, decreased lower extremity function, pain, decreased ROM, impaired coordination, impaired skin, impaired cardiopulmonary response to activity, impaired joint extensibility, orthopedic precautions,pt with good participation and a little groggy from pain medication,pt requires assistance with all mobility at this time and will benefit from moderate intensity therapy upon discharge.    Rehab Prognosis: Fair; patient would benefit from acute skilled PT services to address these deficits and reach maximum level of function.    Recent Surgery: * No surgery found *      Plan:     During this hospitalization, patient to be seen 5 x/week to address the identified rehab impairments via gait training, therapeutic activities, therapeutic exercises, neuromuscular re-education and progress toward the following goals:    Plan of Care Expires:  10/13/23    Subjective     Chief Complaint: R foot pain increased with WB  Patient/Family Comments/goals: pt agreeable to rx.  Pain/Comfort:  Pain Rating 1:  (no rating)  Location - Side 1: Right  Location 1: foot  Pain Addressed 1: Pre-medicate for activity, Reposition, Distraction, Cessation of Activity      Objective:     Communicated with nsg prior to session.  Patient found supine with bed alarm, oxygen, peripheral IV, telemetry upon PT entry to room.     General Precautions: Standard, fall  Orthopedic Precautions: RLE weight bearing as tolerated (with  Darco)  Braces:  (Darco Shoe)  Respiratory Status:  supplemental O2     Functional Mobility:  Bed Mobility:     Supine to Sit: contact guard assistance  Sit to Supine: contact guard assistance and minimum assistance  Transfers:     Sit to Stand:  minimum assistance and moderate assistance with rolling walker  Gait: amb ~6' up/back X 2 trials with RW and Mod A for RW placement with B Darco shoes donned X 1 seated rest  Balance: fair standing balance with RW      AM-PAC 6 CLICK MOBILITY  Turning over in bed (including adjusting bedclothes, sheets and blankets)?: 3  Sitting down on and standing up from a chair with arms (e.g., wheelchair, bedside commode, etc.): 2  Moving from lying on back to sitting on the side of the bed?: 3  Moving to and from a bed to a chair (including a wheelchair)?: 2  Need to walk in hospital room?: 2  Climbing 3-5 steps with a railing?: 1  Basic Mobility Total Score: 13       Treatment & Education: pt also stepped in place inside RW X 10-12 reps with Min A and B Darco's donned,rest periods PRN,le seated ex's X 10-12 reps inc: laq's and hip flex.      Patient left supine with all lines intact, call button in reach, and bed alarm on..    GOALS: see general POC  Multidisciplinary Problems       Physical Therapy Goals          Problem: Physical Therapy    Goal Priority Disciplines Outcome Goal Variances Interventions   Physical Therapy Goal     PT, PT/OT Ongoing, Progressing     Description: Goals to be met by: 10/13/23     Patient will increase functional independence with mobility by performin. Supine to sit with Stand-by Assistance  2. Sit to supine with Stand-by Assistance  3. Sit to stand transfer with Stand-by Assistance  4. Bed to chair transfer with Stand-by Assistance using Rolling Walker  5. Gait  x 80 feet with Stand-by Assistance using Rolling Walker.   6. Lower extremity exercise program x10 reps per handout, with independence                         Time Tracking:     PT  Received On: 09/14/23  PT Start Time: 0932     PT Stop Time: 1004  PT Total Time (min): 32 min     Billable Minutes: Gait Training 23 and Therapeutic Exercise 9    Treatment Type: Treatment  PT/PTA: PTA     Number of PTA visits since last PT visit: 1 09/14/2023

## 2023-09-14 NOTE — PLAN OF CARE
[facility]      HOME HEALTH ORDERS  FACE TO FACE ENCOUNTER    Patient Name: Tasha Hawley  YOB: 1955    PCP: Mesfin Hodges II, MD   [facility] [address] [facility] [address]  PCP Phone Number: [phone]  PCP Fax: [phone]    Encounter Date: 9/9/23    Admit to Home Health    Diagnoses:  Active Hospital Problems    Diagnosis  POA    *Cellulitis [L03.90]  Yes    Displaced fracture of proximal phalanx of right great toe, initial encounter for closed fracture [S92.411A]  Yes    Idiopathic hypotension [I95.0]  Unknown    Fracture, phalanx, foot [S92.919A]  Yes    Coronary artery disease involving native coronary artery of native heart with angina pectoris [I25.119]  Yes    Hypothyroid [E03.9]  Yes    Hypokalemia [E87.6]  Yes    Anxiety [F41.9]  Yes    UTI (urinary tract infection) [N39.0]  Yes     Chronic    Recurrent UTI [N39.0]  Yes    Bradycardia [R00.1]  Yes    S/P insertion of intrathecal pump [Z98.890]  Not Applicable     Chronic    Hypothyroidism (acquired) [E03.9]  Yes    GERD (gastroesophageal reflux disease) [K21.9]  Yes    Congestive heart failure [I50.9]  Yes     Managed with diuretics      Chronic pain syndrome [G89.4]  Yes     Chronic      Resolved Hospital Problems   No resolved problems to display.       Follow Up Appointments:  Future Appointments   Date Time Provider Department Center   9/19/2023 11:00 AM Kaitlynn Hoyt MD [facility]   9/21/2023  1:00 PM Thang Ordoñez CCC-SLP V[facility] OP [facility]   9/22/2023  9:40 AM LAB, SPECIMEN [facility] [place] [facility]   9/25/2023  9:20 AM Mesfin Hodges II, MD [facility] IM [facility]   9/25/2023 11:15 AM Ernestina Moore DPM NOM POD [facility]   9/26/2023  9:30 AM Amanda Head NP [facility] HEM ONC [place] [facility]   9/26/2023  1:30 PM Smitha Condon MD [facility] NEUROS8 [facility]   9/28/2023  3:30 PM Thang Ordoñez CCC-SLP VETH OP [facility]   10/4/2023  1:30 PM Nick Chilel MD [facility] PULMSVC  [facility]       Allergies:  Review of patient's allergies indicates:   Allergen Reactions    (d)-limonene flavor      Other reaction(s): difficult intubation  Other reaction(s): Difficulty breathing    Bactrim [sulfamethoxazole-trimethoprim] Anaphylaxis    Benadryl [diphenhydramine hcl] Shortness Of Breath    Fentanyl Itching, Nausea And Vomiting and Swelling             Imitrex [sumatriptan succinate] Shortness Of Breath    Percocet [oxycodone-acetaminophen] Shortness Of Breath    Topamax [topiramate] Shortness Of Breath    Vancomycin Anaphylaxis     Rash    Butorphanol tartrate     Darvocet a500 [propoxyphene n-acetaminophen]      Other reaction(s): Difficulty breathing    Evening primrose (oenothera biennis)     Lyrica [pregabalin] Other (See Comments)     tremors    White petrolatum-zinc     Zinc oxide-white petrolatum      Other reaction(s): Difficulty breathing    Latex, natural rubber Itching and Rash    Phenytoin Rash and Other (See Comments)     Trouble breathing       Medications: Review discharge medications with patient and family and provide education.    Current Facility-Administered Medications   Medication Dose Route Frequency Provider Last Rate Last Admin    0.9%  NaCl infusion   Intravenous PRN Jon Soto MD 2 mL/hr at 09/14/23 0034 New Bag at 09/14/23 0034    ampicillin-sulbactam (UNASYN) 3 g in sodium chloride 0.9 % 100 mL IVPB (MB+)  3 g Intravenous Q6H Innocent-Justyna Crisostomo  mL/hr at 09/14/23 1208 3 g at 09/14/23 1208    aspirin EC tablet 81 mg  81 mg Oral Daily Sandra Pace MD   81 mg at 09/14/23 0825    atorvastatin tablet 80 mg  80 mg Oral Daily Sandra Pace MD   80 mg at 09/14/23 0825    dextrose 10% bolus 125 mL 125 mL  12.5 g Intravenous PRN Sandra Pace MD        dextrose 10% bolus 250 mL 250 mL  25 g Intravenous PRN Sandra Pace MD        enoxaparin injection 40 mg  40 mg Subcutaneous Daily Sandra Pace MD   40  mg at 09/13/23 1728    famotidine tablet 20 mg  20 mg Oral Daily Jon Soto MD   20 mg at 09/14/23 0826    fluconazole tablet 100 mg  100 mg Oral Daily Justyna Hinton MD   100 mg at 09/14/23 0826    FLUoxetine capsule 60 mg  60 mg Oral Daily Sandra Pace MD   60 mg at 09/14/23 0826    fluticasone propionate 50 mcg/actuation nasal spray 100 mcg  2 spray Each Nostril Daily Lisa Carrasco NP   100 mcg at 09/14/23 0825    furosemide injection 40 mg  40 mg Intravenous Q12H Jon Soto MD   40 mg at 09/14/23 1011    glucagon (human recombinant) injection 1 mg  1 mg Intramuscular PRN Sandra Pace MD        glucose chewable tablet 16 g  16 g Oral PRN Sandra Pace MD        glucose chewable tablet 24 g  24 g Oral PRN Sandra Pace MD        HYDROcodone-acetaminophen  mg per tablet 1 tablet  1 tablet Oral Q6H PRN Barber Allen MD   1 tablet at 09/14/23 1024    HYDROmorphone injection 0.5 mg  0.5 mg Intravenous Q6H PRN Barber Allen MD   0.5 mg at 09/14/23 0618    HYDROMORPHONE intrathecal pain pump compound  1 Device subcutaneous (via wearable injector) Continuous Sandra Pace MD        levothyroxine tablet 150 mcg  150 mcg Oral Before breakfast Sandra Pace MD   150 mcg at 09/14/23 0610    miconazole nitrate 2% ointment   Topical (Top) BID Justyna Hinton MD   Given at 09/14/23 0826    naloxone 0.4 mg/mL injection 0.02 mg  0.02 mg Intravenous PRN Sandra Pace MD        potassium chloride SA CR tablet 20 mEq  20 mEq Oral Daily Sandra Pace MD   20 mEq at 09/14/23 0826    QUEtiapine tablet 200 mg  200 mg Oral QHS Barber Allen MD   200 mg at 09/13/23 2028    senna tablet 3 tablet  3 tablet Oral Nightly Barber Allen MD   3 tablet at 09/13/23 2028    sodium chloride 0.9% flush 10 mL  10 mL Intravenous Q12H PRN Sandra Pace MD   10 mL at 09/12/23 0664     tiotropium bromide 2.5 mcg/actuation inhaler 2 puff  2 puff Inhalation Daily aSndra Pace MD   2 puff at 09/14/23 0734    traZODone tablet 300 mg  300 mg Oral QHS Barber Allen MD   300 mg at 09/13/23 2028     Current Discharge Medication List        START taking these medications    Details   clindamycin (CLEOCIN) 300 MG capsule Take 1 capsule (300 mg total) by mouth every 8 (eight) hours.  Qty: 30 capsule, Refills: 0           CONTINUE these medications which have NOT CHANGED    Details   aspirin (ECOTRIN) 81 MG EC tablet Take 1 tablet (81 mg total) by mouth once daily.  Qty: 90 tablet, Refills: 3    Associated Diagnoses: Chronic pain syndrome      atorvastatin (LIPITOR) 80 MG tablet Take 1 tablet (80 mg total) by mouth once daily.  Qty: 90 tablet, Refills: 3    Associated Diagnoses: Mixed hyperlipidemia      darifenacin (ENABLEX) 7.5 MG Tb24 Take 7.5 mg by mouth.      docusate sodium (COLACE) 100 MG capsule Take 200 mg by mouth every evening.      ferrous gluconate (FERGON) 324 MG tablet Take 1 tablet (324 mg total) by mouth daily with breakfast.  Qty: 90 tablet, Refills: 1    Associated Diagnoses: Iron deficiency anemia, unspecified iron deficiency anemia type      FLUoxetine 20 MG capsule Take 3 capsules (60 mg total) by mouth once daily.  Qty: 270 capsule, Refills: 3    Associated Diagnoses: Anxiety      fluticasone propionate (FLONASE) 50 mcg/actuation nasal spray 2 sprays (100 mcg total) by Each Nostril route once daily.  Qty: 16 g, Refills: 0      furosemide (LASIX) 40 MG tablet Take 2 tablets (80 mg total) by mouth 2 (two) times a day.  Qty: 360 tablet, Refills: 3      HYDROcodone-acetaminophen (NORCO)  mg per tablet Take 1 tablet by mouth every 6 (six) hours as needed for breakthrough pain.      hydrocortisone (CORTEF) 10 MG Tab Take 1 tablet (10 mg total) by mouth every evening.  Qty: 30 tablet, Refills: 5      hydrOXYzine (ATARAX) 50 MG tablet Take 1 tablet (50 mg total) by  mouth every 4 (four) hours as needed for Anxiety.  Qty: 30 tablet, Refills: 0      intrathecal pain pump compound Hydromorphone (7.5 mg/mL) infusion at 8.59 mg/day (0.3578 mg/hr) out of a total reservoir volume of 40 mL  Pump filled monthly      levothyroxine (SYNTHROID) 150 MCG tablet Take 1 tablet (150 mcg total) by mouth before breakfast.  Qty: 30 tablet, Refills: 11      liothyronine (CYTOMEL) 25 MCG Tab Take 1 tablet (25 mcg total) by mouth once daily.  Qty: 30 tablet, Refills: 11      pantoprazole (PROTONIX) 40 MG tablet Take 1 tablet (40 mg total) by mouth once daily.  Qty: 90 tablet, Refills: 3    Associated Diagnoses: Gastroesophageal reflux disease, unspecified whether esophagitis present      potassium chloride (MICRO-K) 10 MEQ CpSR Take 2 capsules (20 mEq total) by mouth once daily.  Qty: 60 capsule, Refills: 11      senna (SENNA LAX) 8.6 mg tablet Take 2 tablets by mouth 2 (two) times daily.      tiotropium bromide (SPIRIVA RESPIMAT) 2.5 mcg/actuation inhaler Inhale 2 puffs into the lungs once daily. Controller  Qty: 4 g, Refills: 1      butalbital-acetaminophen-caffeine -40 mg (FIORICET, ESGIC) -40 mg per tablet Take 1 tablet by mouth daily as needed.      cyanocobalamin, vitamin B-12, 5,000 mcg Subl Place 1 tablet under the tongue once daily.      fludrocortisone (FLORINEF) 0.1 mg Tab Take a half-tablet (50 mcg) by mouth once daily  Qty: 15 tablet, Refills: 5      ketorolac (TORADOL) 10 mg tablet Take 10 mg by mouth daily as needed.      LIDOcaine (LIDODERM) 5 % Place 1 patch onto the skin once daily. Remove & Discard patch within 12 hours or as directed by MD  Refills: 0      nitroGLYCERIN (NITROSTAT) 0.4 MG SL tablet Place 0.4 mg under the tongue every 5 (five) minutes as needed for Chest pain.      nystatin (MYCOSTATIN) powder Apply topically 4 (four) times daily.  Qty: 60 g, Refills: 0      ondansetron (ZOFRAN-ODT) 4 MG TbDL Take 4 mg by mouth every 4 to 6 hours as needed.       promethazine (PHENERGAN) 25 MG tablet Take 25 mg by mouth every 6 (six) hours as needed for Nausea.      QUEtiapine (SEROQUEL) 100 MG Tab TAKE 2 TABLETS (200 MG) BY MOUTH NIGHTLY  Qty: 180 tablet, Refills: 3    Associated Diagnoses: Insomnia due to medical condition      traZODone (DESYREL) 300 MG tablet Take 1 tablet (300 mg total) by mouth every evening.  Qty: 90 tablet, Refills: 3    Associated Diagnoses: Insomnia due to medical condition           STOP taking these medications       traMADoL (ULTRAM) 50 mg tablet Comments:   Reason for Stopping:         loperamide HCl (IMODIUM A-D ORAL) Comments:   Reason for Stopping:                 I have seen and examined this patient within the last 30 days. My clinical findings that support the need for the home health skilled services and home bound status are the following:no   Weakness/numbness causing balance and gait disturbance due to Heart Failure, Fracture, and Weakness/Debility making it taxing to leave home.  Requiring assistive device to leave home due to unsteady gait caused by  Heart Failure, Fracture, and Weakness/Debility.  Medical restrictions requiring assistance of another human to leave home due to  Unstable ambulation, Frequent Falls, and Decreased range of motions in extremities.     Diet:   cardiac diet    Labs:  Report Lab results to PCP.    Referrals/ Consults  Physical Therapy to evaluate and treat. Evaluate for home safety and equipment needs; Establish/upgrade home exercise program. Perform / instruct on therapeutic exercises, gait training, transfer training, and Range of Motion.   to evaluate for community resources/long-range planning.    Activities:   other mild weighbearing    Nursing:   Agency to admit patient within 24 hours of hospital discharge unless specified on physician order or at patient request    SN to complete comprehensive assessment including routine vital signs. Instruct on disease process and s/s of  complications to report to MD. Review/verify medication list sent home with the patient at time of discharge  and instruct patient/caregiver as needed. Frequency may be adjusted depending on start of care date.     Skilled nurse to perform up to 3 visits PRN for symptoms related to diagnosis    Notify MD if SBP > 160 or < 90; DBP > 90 or < 50; HR > 120 or < 50; Temp > 101; O2 < 88%; Other:       Ok to schedule additional visits based on staff availability and patient request on consecutive days within the home health episode.    When multiple disciplines ordered:    Start of Care occurs on Sunday - Wednesday schedule remaining discipline evaluations as ordered on separate consecutive days following the start of care.    Thursday SOC -schedule subsequent evaluations Friday and Monday the following week.     Friday - Saturday SOC - schedule subsequent discipline evaluations on consecutive days starting Monday of the following week.    For all post-discharge communication and subsequent orders please contact patient's primary care physician. If unable to reach primary care physician or do not receive response within 30 minutes, please contact  for clinical staff order clarification    Miscellaneous   Routine Skin for Bedridden Patients: Instruct patient/caregiver to apply moisture barrier cream to all skin folds and wet areas in perineal area daily and after baths and all bowel movements.    Home Health Aide:  Nursing Three times weekly and Physical Therapy Three times weekly    Wound Care Orders  no    I certify that this patient is confined to her home and needs intermittent skilled nursing care and physical therapy.

## 2023-09-14 NOTE — PLAN OF CARE
Pt and family agreeable with discharge to home with HH. F/u scheduled tomorrow with Dr. Juarez to help manage pt care. Spoke with daughter Jaycee Hawley [phone] who agreed to bring the pt to her follow up appt tomorrow for 10 AM. Daughter stated the pt is very non compliant at home and refuse to go to a skilled facility for care. Pt will be transported to home with spouse Dangelo Hawley [phone] when discharged. All appts made and added to chart for AVS.   F/u with Dr. Juarez in [place] at 10:00 AM 9/15/23    Future Appointments   Date Time Provider Department Center   9/19/2023 11:00 AM Kaitlynn Hoyt MD [facility] RHEUM [address] [facility]   9/21/2023  1:00 PM Thang Ordoñez CCC-SLP VETH OP [facility]   9/22/2023  9:40 AM LAB, SPECIMEN [facility]   9/25/2023  9:20 AM Mesfin Hodges II, MD [facility] IM [address] [facility]   9/25/2023 11:15 AM Ernestina Moore, DPM [facility]   9/26/2023  9:30 AM Amanda Head, SHONDA [facility] HEM ONC [place] [facility]   9/26/2023  1:30 PM Smitha Condon MD [facility] NEUROS8 [address]   9/28/2023  3:30 PM Thang Ordoñez CCC-SLP VE OP [facility]   10/4/2023  1:30 PM Nick Chilel MD [facility] PULMSVC [address]   10/27/2023 10:00 AM Juan A Madera MD [facility] PSYCH [address]        09/14/23 1219   Discharge Planning   Assessment Type Discharge Planning Reassessment   Resource/Environmental Concerns none   Support Systems Children;Spouse/significant other   Equipment Currently Used at Home bedside commode;oxygen;rollator;wheelchair;cane, straight   Current Living Arrangements home   Patient/Family Anticipates Transition to home;home with family   Patient/Family Anticipated Services at Transition home health care   DME Needed Upon Discharge  none   Discharge Plan A Home;Home with family;Home Health   Discharge Plan B Skilled Nursing Facility

## 2023-09-14 NOTE — PLAN OF CARE
Problem: Physical Therapy  Goal: Physical Therapy Goal  Description: Goals to be met by: 10/13/23     Patient will increase functional independence with mobility by performin. Supine to sit with Stand-by Assistance  2. Sit to supine with Stand-by Assistance  3. Sit to stand transfer with Stand-by Assistance  4. Bed to chair transfer with Stand-by Assistance using Rolling Walker  5. Gait  x 80 feet with Stand-by Assistance using Rolling Walker.   6. Lower extremity exercise program x10 reps per handout, with independence    Outcome: Ongoing, Progressing

## 2023-09-14 NOTE — PLAN OF CARE
AOX4. VS stable. Safety maintained. Meds given per MAR. Pain treated with PRN meds. Denies N/V at this time. O2@5L/min via NC. Suprapubic cath in place draingin clear yellow urine. Regular diet maintained. Cardiac monitoring in place. FARHAD @ bedside. Resting quietly. SR up X 2. Call light in reach. Bed alarm set. Pt denies any further needs at this time. Plan of care ongoing.       Problem: Adult Inpatient Plan of Care  Goal: Plan of Care Review  Outcome: Ongoing, Progressing  Goal: Patient-Specific Goal (Individualized)  Outcome: Ongoing, Progressing     Problem: Impaired Wound Healing  Goal: Optimal Wound Healing  Outcome: Ongoing, Progressing     Problem: Skin or Soft Tissue Infection  Goal: Absence of Infection Signs and Symptoms  Outcome: Ongoing, Progressing     Problem: Hypertension Comorbidity  Goal: Blood Pressure in Desired Range  Outcome: Ongoing, Progressing     Problem: Pain Chronic (Persistent) (Comorbidity Management)  Goal: Acceptable Pain Control and Functional Ability  Outcome: Ongoing, Progressing

## 2023-09-14 NOTE — PROGRESS NOTES
[facility]                    Pharmacy       Discharge Medication Education    Patient ACCEPTED medication education. Pharmacy has provided education on the name, indication, and possible side effects of the medication(s) prescribed, using teach-back method.     The following medications have also been discussed, during this admission.        Medication List        START taking these medications      clindamycin 300 MG capsule  Commonly known as: CLEOCIN  Take 1 capsule (300 mg total) by mouth every 8 (eight) hours.            CHANGE how you take these medications      QUEtiapine 100 MG Tab  Commonly known as: SEROQUEL  TAKE 2 TABLETS (200 MG) BY MOUTH NIGHTLY  What changed:   how much to take  how to take this  when to take this  additional instructions            CONTINUE taking these medications      aspirin 81 MG EC tablet  Commonly known as: ECOTRIN  Take 1 tablet (81 mg total) by mouth once daily.     atorvastatin 80 MG tablet  Commonly known as: LIPITOR  Take 1 tablet (80 mg total) by mouth once daily.     butalbital-acetaminophen-caffeine -40 mg -40 mg per tablet  Commonly known as: FIORICET, ESGIC     cyanocobalamin (vitamin B-12) 5,000 mcg Subl     darifenacin 7.5 MG Tb24  Commonly known as: ENABLEX     docusate sodium 100 MG capsule  Commonly known as: COLACE     ferrous gluconate 324 MG tablet  Commonly known as: FERGON  Take 1 tablet (324 mg total) by mouth daily with breakfast.     fludrocortisone 0.1 mg Tab  Commonly known as: FLORINEF  Take a half-tablet (50 mcg) by mouth once daily     FLUoxetine 20 MG capsule  Take 3 capsules (60 mg total) by mouth once daily.     fluticasone propionate 50 mcg/actuation nasal spray  Commonly known as: FLONASE  2 sprays (100 mcg total) by Each Nostril route once daily.     furosemide 40 MG tablet  Commonly known as: LASIX  Take 2 tablets (80 mg total) by mouth 2 (two) times a day.     HYDROcodone-acetaminophen  mg per  tablet  Commonly known as: NORCO     hydrocortisone 10 MG Tab  Commonly known as: CORTEF  Take 1 tablet (10 mg total) by mouth every evening.     hydrOXYzine 50 MG tablet  Commonly known as: ATARAX  Take 1 tablet (50 mg total) by mouth every 4 (four) hours as needed for Anxiety.     INTRATHECAL PAIN PUMP COMPOUND     ketorolac 10 mg tablet  Commonly known as: TORADOL     levothyroxine 150 MCG tablet  Commonly known as: SYNTHROID  Take 1 tablet (150 mcg total) by mouth before breakfast.     LIDOcaine 5 %  Commonly known as: LIDODERM  Place 1 patch onto the skin once daily. Remove & Discard patch within 12 hours or as directed by MD     liothyronine 25 MCG Tab  Commonly known as: CYTOMEL  Take 1 tablet (25 mcg total) by mouth once daily.     nitroGLYCERIN 0.4 MG SL tablet  Commonly known as: NITROSTAT     nystatin powder  Commonly known as: MYCOSTATIN  Apply topically 4 (four) times daily.     ondansetron 4 MG Tbdl  Commonly known as: ZOFRAN-ODT     pantoprazole 40 MG tablet  Commonly known as: PROTONIX  Take 1 tablet (40 mg total) by mouth once daily.     potassium chloride 10 MEQ Cpsr  Commonly known as: MICRO-K  Take 2 capsules (20 mEq total) by mouth once daily.     promethazine 25 MG tablet  Commonly known as: PHENERGAN     senna 8.6 mg tablet  Commonly known as: SENNA LAX  Take 2 tablets by mouth 2 (two) times daily.     SPIRIVA RESPIMAT 2.5 mcg/actuation inhaler  Generic drug: tiotropium bromide  Inhale 2 puffs into the lungs once daily. Controller     traZODone 300 MG tablet  Commonly known as: DESYREL  Take 1 tablet (300 mg total) by mouth every evening.            STOP taking these medications      IMODIUM A-D ORAL     traMADoL 50 mg tablet  Commonly known as: ULTRAM               Where to Get Your Medications        These medications were sent to [facility] [address], [place] [address]      Hours: Mon-Fri, 8a-5:30p Phone: [phone]   clindamycin 300 MG capsule          Thank  you  Prerna Somers, PharmD  [phone]

## 2023-09-14 NOTE — PLAN OF CARE
From , pt is ready for discharge. All f/u appts made. Pt and spouse agreeable with discharge to home today. Spoke with  and is on his way to transport pt home. Spouse would like to be present when AVS is instructed. HH orders sent to [facility] and will go to pt home tomorrow.     Future Appointments   Date Time Provider Department Center   9/15/2023 10:00 AM Imani Juarez MD [facility] IMPRI [place] [facility]   9/19/2023 11:00 AM Kaitlynn Hoyt MD [facility] RHEUM [address]   9/21/2023  1:00 PM Thang Ordoñez CCC-SLP [facility]   9/22/2023  9:40 AM LAB, SPECIMEN [facility] LAB [place] [facility]   9/25/2023  9:20 AM Mesfin Hodges II, MD [facility] IM [address] [facility]W   9/25/2023 11:15 AM Ernestina Moore DPM [facility] POD [address]   9/26/2023  9:30 AM Amanda Head, NP [facility] HEM ONC [place] [facility]   9/26/2023  1:30 PM Smitha Condon MD [facility] NEUROS8 [address]   9/28/2023  3:30 PM Thang Ordoñez CCC-SLP [facility]   10/4/2023  1:30 PM Nick Chilel MD [facility] PULMSVC [address]   10/27/2023 10:00 AM Juan A Madera MD [facility] PSYCH [address]        09/14/23 1300   Final Note   Assessment Type Final Discharge Note   Anticipated Discharge Disposition Home-Health   What phone number can be called within the next 1-3 days to see how you are doing after discharge? [phone]   Hospital Resources/Appts/Education Provided Appointments scheduled and added to AVS   Post-Acute Status   Post-Acute Authorization Home Health   Home Health Status Set-up Complete/Auth obtained   Discharge Delays None known at this time

## 2023-09-14 NOTE — PT/OT/SLP PROGRESS
Occupational Therapy      Patient Name:  Tasha Hawley   MRN:  [id number]    Patient not seen today secondary to Other (Comment). Pt dressed in room awaiting husbands arrival for discharge.    9/14/2023

## 2023-09-14 NOTE — DISCHARGE SUMMARY
[facility] Medicine  Discharge Summary      Patient Name: Tasha Hawley  MRN: [id number]  LUH: [id number]  Patient Class: OP- Observation  Admission Date: 9/9/2023  Hospital Length of Stay: 0 days  Discharge Date and Time:  09/14/2023 5:52 PM  Attending Physician: Jon Soto MD   Discharging Provider: Jon Soto MD  Primary Care Provider: Mesfin Hodges II, MD    Primary Care Team: Networked reference to record PCT     HPI:   68 year old woman with PMH of HFpEF, CAD, chronic back pain s/p multiple surgeries and now with intrathecal pump, derepression, chronic bilateral venous insufficiency. The patient amublates with a walker and she presented after a machanical fall 2 days ago and her xray was remarkable for fracture of the great toe proximal phalanx. She presented today due to worsening pain and redness in both of her lower ext.       * No surgery found *      Hospital Course:   9/12  No new acute event overnight on IV ABx with Unasyn  9/13  Was seen by podiatry   68 year old woman with PMH of HFpEF, CAD, chronic back pain s/p multiple surgeries and now with intrathecal pump, derepression, chronic bilateral venous insufficiency, was admitted for notion of mechanical fall 2 days ago and her xray was remarkable for fracture of the great toe proximal phalanx. During the course of this admission, she had serial routine labs without major significance. Her CT foot showed  Minimally displaced fracture of the proximal phalanx of the 1st digit with soft tissue swelling. Remote trauma with deformity of the 5th metatarsal. 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 She was seen by ortho  Recommend minimal weightbearing in fracture boot at all times while ambulating with assistance as tolerated  Rest, elevate. She was also seen by podiatry WBAT RLE, hard sole shoe. Her pain has been under controlled with episode of sudden falling sleep. Due to her frail condition case management was consulted which  she has refused. She will be DC home with HH, F/U with PCP and ortho.       Goals of Care Treatment Preferences:  Code Status: Full Code    Health care agent: Dangelo Hawley (spouse)  Health care agent number: [phone]    Living Will: Yes     What is most important right now is to focus on symptom/pain control, extending life as long as possible, even it it means sacrificing quality.  Accordingly, we have decided that the best plan to meet the patient's goals includes continuing with treatment.      Consults:   Consults (From admission, onward)          Status Ordering Provider     Inpatient consult to Podiatry  Once        Provider:  (Not yet assigned)    Completed MANUEL KOCH     IP consult to case management  Once        Provider:  (Not yet assigned)    Completed STEVEN DEL CID     Inpatient consult to Orthopedic Surgery  Once        Provider:  (Not yet assigned)    Completed KRISTEL FOSTER            No new Assessment & Plan notes have been filed under this hospital service since the last note was generated.  Service: Hospital Medicine    Final Active Diagnoses:    Diagnosis Date Noted POA    PRINCIPAL PROBLEM:  Cellulitis [L03.90] 07/07/2021 Yes    Displaced fracture of proximal phalanx of right great toe, initial encounter for closed fracture [S92.411A] 09/13/2023 Yes    Idiopathic hypotension [I95.0] 09/12/2023 Unknown    Fracture, phalanx, foot [S92.919A] 09/09/2023 Yes    Coronary artery disease involving native coronary artery of native heart with angina pectoris [I25.119] 08/11/2023 Yes    Hypothyroid [E03.9] 07/24/2023 Yes    Hypokalemia [E87.6] 01/09/2023 Yes    Anxiety [F41.9] 02/05/2022 Yes    UTI (urinary tract infection) [N39.0] 06/03/2021 Yes     Chronic    Recurrent UTI [N39.0] 06/08/2018 Yes    Bradycardia [R00.1] 11/14/2017 Yes    S/P insertion of intrathecal pump [Z98.890] 03/31/2017 Not Applicable     Chronic    Hypothyroidism (acquired) [E03.9] 02/02/2017 Yes     GERD (gastroesophageal reflux disease) [K21.9] 08/16/2016 Yes    Congestive heart failure [I50.9] 07/18/2014 Yes    Chronic pain syndrome [G89.4] 05/01/2013 Yes     Chronic      Problems Resolved During this Admission:       Discharged Condition: fair    Disposition: Home or Self Care    Follow Up:   Follow-up Information       Mesfin Hodges II, MD Follow up on 9/25/2023.    Specialty: Internal Medicine  Why: Appiontment time scheduled for 9:20 AM  Contact information:  [address]  [place] [address]  [phone]               [facility] PODIATRY Follow up on 9/25/2023.    Specialty: Podiatry  Why: Apppointment scheduled for 11:15 AM. CLINIC ON 5TH FLOOR with   Dr MARKUS SRINIVASAN  Contact information:  [address] [facility] [address]  [phone]             Imani Juarez MD Follow up on 9/15/2023.    Specialty: Internal Medicine  Why: Appointment scheduled for tomorrow at 10:00 AM  Contact information:  [address]  [address]  [facility] [address]  [phone]                           Patient Instructions:      Ambulatory referral/consult to Pain Clinic   Standing Status: Future   Referral Priority: Routine Referral Type: Consultation   Referral Reason: Specialty Services Required   Requested Specialty: Pain Medicine   Number of Visits Requested: 1     Diet Cardiac     Notify your health care provider if you experience any of the following:  temperature >100.4     Notify your health care provider if you experience any of the following:  persistent nausea and vomiting or diarrhea     Notify your health care provider if you experience any of the following:  severe uncontrolled pain     Notify your health care provider if you experience any of the following:  redness, tenderness, or signs of infection (pain, swelling, redness, odor or green/yellow discharge around incision site)     Notify your health care provider if you experience any of the following:  difficulty breathing or  increased cough     Notify your health care provider if you experience any of the following:  worsening rash     Weight bearing restrictions (specify):       Significant Diagnostic Studies: Labs: BMP:   Recent Labs   Lab 09/13/23  0859 09/14/23  0824   * 141*    142   K 3.7 4.0   CL 94* 99   CO2 36* 33*   BUN 6* 7*   CREATININE 0.8 0.8   CALCIUM 9.0 9.0   , CMP   Recent Labs   Lab 09/13/23  0859 09/14/23  0824    142   K 3.7 4.0   CL 94* 99   CO2 36* 33*   * 141*   BUN 6* 7*   CREATININE 0.8 0.8   CALCIUM 9.0 9.0   PROT 6.3  --    ALBUMIN 2.9*  --    BILITOT 0.3  --    ALKPHOS 143*  --    AST 10  --    ALT 7*  --    ANIONGAP 9 10   , CBC   Recent Labs   Lab 09/13/23  0900 09/14/23  0824   WBC 8.63 4.56   HGB 10.9* 11.3*   HCT 35.7* 37.6    210   , and All labs within the past 24 hours have been reviewed  Radiology: X-Ray: CXR: X-Ray Chest 1 View (CXR): No results found for this visit on 09/09/23.    Pending Diagnostic Studies:       None           Medications:  Reconciled Home Medications:      Medication List        START taking these medications      clindamycin 300 MG capsule  Commonly known as: CLEOCIN  Take 1 capsule (300 mg total) by mouth every 8 (eight) hours.            CHANGE how you take these medications      QUEtiapine 100 MG Tab  Commonly known as: SEROQUEL  TAKE 2 TABLETS (200 MG) BY MOUTH NIGHTLY  What changed:   how much to take  how to take this  when to take this  additional instructions            CONTINUE taking these medications      aspirin 81 MG EC tablet  Commonly known as: ECOTRIN  Take 1 tablet (81 mg total) by mouth once daily.     atorvastatin 80 MG tablet  Commonly known as: LIPITOR  Take 1 tablet (80 mg total) by mouth once daily.     butalbital-acetaminophen-caffeine -40 mg -40 mg per tablet  Commonly known as: FIORICET, ESGIC  Take 1 tablet by mouth daily as needed.     cyanocobalamin (vitamin B-12) 5,000 mcg Subl  Place 1 tablet under the  tongue once daily.     darifenacin 7.5 MG Tb24  Commonly known as: ENABLEX  Take 7.5 mg by mouth.     docusate sodium 100 MG capsule  Commonly known as: COLACE  Take 200 mg by mouth every evening.     ferrous gluconate 324 MG tablet  Commonly known as: FERGON  Take 1 tablet (324 mg total) by mouth daily with breakfast.     fludrocortisone 0.1 mg Tab  Commonly known as: FLORINEF  Take a half-tablet (50 mcg) by mouth once daily     FLUoxetine 20 MG capsule  Take 3 capsules (60 mg total) by mouth once daily.     fluticasone propionate 50 mcg/actuation nasal spray  Commonly known as: FLONASE  2 sprays (100 mcg total) by Each Nostril route once daily.     furosemide 40 MG tablet  Commonly known as: LASIX  Take 2 tablets (80 mg total) by mouth 2 (two) times a day.     HYDROcodone-acetaminophen  mg per tablet  Commonly known as: NORCO  Take 1 tablet by mouth every 6 (six) hours as needed for breakthrough pain.     hydrocortisone 10 MG Tab  Commonly known as: CORTEF  Take 1 tablet (10 mg total) by mouth every evening.     hydrOXYzine 50 MG tablet  Commonly known as: ATARAX  Take 1 tablet (50 mg total) by mouth every 4 (four) hours as needed for Anxiety.     INTRATHECAL PAIN PUMP COMPOUND  Hydromorphone (7.5 mg/mL) infusion at 8.59 mg/day (0.3578 mg/hr) out of a total reservoir volume of 40 mL  Pump filled monthly     ketorolac 10 mg tablet  Commonly known as: TORADOL  Take 10 mg by mouth daily as needed.     levothyroxine 150 MCG tablet  Commonly known as: SYNTHROID  Take 1 tablet (150 mcg total) by mouth before breakfast.     LIDOcaine 5 %  Commonly known as: LIDODERM  Place 1 patch onto the skin once daily. Remove & Discard patch within 12 hours or as directed by MD     liothyronine 25 MCG Tab  Commonly known as: CYTOMEL  Take 1 tablet (25 mcg total) by mouth once daily.     nitroGLYCERIN 0.4 MG SL tablet  Commonly known as: NITROSTAT  Place 0.4 mg under the tongue every 5 (five) minutes as needed for Chest pain.      nystatin powder  Commonly known as: MYCOSTATIN  Apply topically 4 (four) times daily.     ondansetron 4 MG Tbdl  Commonly known as: ZOFRAN-ODT  Take 4 mg by mouth every 4 to 6 hours as needed.     pantoprazole 40 MG tablet  Commonly known as: PROTONIX  Take 1 tablet (40 mg total) by mouth once daily.     potassium chloride 10 MEQ Cpsr  Commonly known as: MICRO-K  Take 2 capsules (20 mEq total) by mouth once daily.     promethazine 25 MG tablet  Commonly known as: PHENERGAN  Take 25 mg by mouth every 6 (six) hours as needed for Nausea.     senna 8.6 mg tablet  Commonly known as: SENNA LAX  Take 2 tablets by mouth 2 (two) times daily.     SPIRIVA RESPIMAT 2.5 mcg/actuation inhaler  Generic drug: tiotropium bromide  Inhale 2 puffs into the lungs once daily. Controller     traZODone 300 MG tablet  Commonly known as: DESYREL  Take 1 tablet (300 mg total) by mouth every evening.            STOP taking these medications      IMODIUM A-D ORAL     traMADoL 50 mg tablet  Commonly known as: ULTRAM              Indwelling Lines/Drains at time of discharge:   Lines/Drains/Airways       Drain  Duration                  Suprapubic Catheter 12/13/22 100% silicone 20 Fr. 275 days              Line  Duration                  Subcutaneous Infusion Pump Abdomen (Comment) -- days                    Time spent on the discharge of patient: >30 minutes         Jon Soto MD  Department of Hospital Medicine  [facility]

## 2023-09-15 ENCOUNTER — TELEPHONE (OUTPATIENT)
Dept: INTERNAL MEDICINE | Facility: CLINIC | Age: 68
End: 2023-09-15
Payer: MEDICARE

## 2023-09-15 ENCOUNTER — PATIENT MESSAGE (OUTPATIENT)
Dept: PRIMARY CARE CLINIC | Facility: CLINIC | Age: 68
End: 2023-09-15

## 2023-09-15 ENCOUNTER — PATIENT MESSAGE (OUTPATIENT)
Dept: ENDOCRINOLOGY | Facility: CLINIC | Age: 68
End: 2023-09-15
Payer: MEDICARE

## 2023-09-15 DIAGNOSIS — E03.9 HYPOTHYROIDISM (ACQUIRED): Primary | ICD-10-CM

## 2023-09-15 DIAGNOSIS — I89.0 LYMPHEDEMA: Primary | ICD-10-CM

## 2023-09-15 LAB
BACTERIA BLD CULT: NORMAL
BACTERIA BLD CULT: NORMAL

## 2023-09-15 RX ORDER — LIOTHYRONINE SODIUM 5 UG/1
5 TABLET ORAL DAILY
Qty: 30 TABLET | Refills: 11 | Status: ON HOLD | OUTPATIENT
Start: 2023-09-15 | End: 2024-02-19 | Stop reason: HOSPADM

## 2023-09-15 NOTE — TELEPHONE ENCOUNTER
----- Message from Eris Astorga sent at 9/15/2023 12:50 PM CDT -----  Contact: Pt [phone]  She is requesting home health orders with PT, OT, and a nurse    Thank you

## 2023-09-18 ENCOUNTER — TELEPHONE (OUTPATIENT)
Dept: RHEUMATOLOGY | Facility: CLINIC | Age: 68
End: 2023-09-18
Payer: MEDICARE

## 2023-09-18 ENCOUNTER — OUTPATIENT CASE MANAGEMENT (OUTPATIENT)
Dept: ADMINISTRATIVE | Facility: OTHER | Age: 68
End: 2023-09-18
Payer: MEDICARE

## 2023-09-18 ENCOUNTER — PATIENT MESSAGE (OUTPATIENT)
Dept: RHEUMATOLOGY | Facility: CLINIC | Age: 68
End: 2023-09-18
Payer: MEDICARE

## 2023-09-18 ENCOUNTER — TELEPHONE (OUTPATIENT)
Dept: INTERNAL MEDICINE | Facility: CLINIC | Age: 68
End: 2023-09-18
Payer: MEDICARE

## 2023-09-18 NOTE — PROGRESS NOTES
Outpatient Care Management  Plan of Care Follow Up Visit    Patient: Tasha Hawley  MRN: [id number]  Date of Service: 09/18/2023  Completed by: Michelle Box RN  Referral Date: 04/17/2023    Reason for Visit   Patient presents with    OPCM RN Follow Up Call       Brief Summary: Patient states she blacked out and fell, was admitted to the hospital on 9/10 for worsening pain to bilateral lower extremities.  She suffered a fracture in her great toe proximal phalanx.  She is having a lot of foot pain.  She tries to stay off of it as much as possible but does walk around once an hour to prevent blood clots.  She states her bilateral legs are wrapped and both of them are swollen, with redness almost purple and blisters.  She is trying to get back with [facility].  Dr. Hodges's office has faxed the orders to their office.  She states her current weight is 230 and stays right around that weight.  Patient states she has not received the educational materials sent a second time.  Patient then requested to end phone call because she is in a lot of pain in her foot and legs and just wants to lay down.    CM reviewed with patient the signs and symptoms of VTE including pain or tenderness in an extremity not caused by injury; swelling of leg or arm and skin of extremity that is warm to touch, red or discolored (pale, blue).  CM educated that it will be very difficult to know if she has a blood clot in her leg due to the fact that she has pain, redness and swelling to bilateral legs all the time.  CM informed her to pay attention if one leg becomes larger or more swollen than the other one.     Next steps:   Follow up phone call in three weeks  Follow up if weighing herself daily and logging  Follow up on 3 lb/5 lb rule  Follow up if got earlier psychiatry appointment with Dr. Madera  Follow up if received educational materials resent  Follow up if eating processed meats  Follow up on low sodium diet  Follow up  on S&S of CHF  Follow up on S&S of VTE  Educate on S&S of PE

## 2023-09-18 NOTE — TELEPHONE ENCOUNTER
-attempted to return pt's call. No answer Left voicemail    Thank you,    Mame   ----- Message from Bibi Mueller sent at 9/18/2023  7:58 AM CDT -----  Tasha Hawley calling regarding Same Day Appointment Access for wanting to see if she can be seen today for her f/u appt scheduled tomorrow, she stated she called earlier, but is in pain...I advised her to allow a call back to be inform but she insist that I send another massage for a call back [phone]

## 2023-09-18 NOTE — TELEPHONE ENCOUNTER
----- Message from Dashawn Magdaleno sent at 9/15/2023  4:08 PM CDT -----  Contact: Nino/[phone]  [email or web address]     Patient is calling for Medical Advice regarding:  Nino states the pt would like to go to [facility] to do the pt. And they are already working on it but would like to speak with nurse to make sure they are on the same page regarding pt.     How long has patient had these symptoms:    Pharmacy name and phone#:    Would like response via IMedExchangehart:      Comments:

## 2023-09-18 NOTE — PROGRESS NOTES
"Sw received message from OPCM RN that pt would like to switch HH companies since discharging from the hospital. GERSON spoke with Brent with [facility] stating they received the HH orders, but [facility] still had the authorization. GERSON spoke with Letty at [facility]. She reported pt wanted physical therapy "urgently" on Saturday, but [facility] was unable to staff this until Monday. Pt then wanted to switch and pt was informed that it could take a few days for authorization to be switched to a different agency. Letty stated she canceled the authorization on their end this morning. GERSON spoke with [facility] again and was informed they had received the authorization from insurance and have scheduled to see patient tomorrow. SW attempted to contact pt to ensure she knew of this, no answer. Unable to leave a message. Information above communicated with OPCM RN. GERSON will attempt at a later date to contact pt to ensure admit to HH.     "

## 2023-09-19 ENCOUNTER — LAB VISIT (OUTPATIENT)
Dept: LAB | Facility: HOSPITAL | Age: 68
End: 2023-09-19
Attending: INTERNAL MEDICINE
Payer: MEDICARE

## 2023-09-19 ENCOUNTER — OFFICE VISIT (OUTPATIENT)
Dept: RHEUMATOLOGY | Facility: CLINIC | Age: 68
End: 2023-09-19
Payer: MEDICARE

## 2023-09-19 VITALS
WEIGHT: 228 LBS | HEIGHT: 65 IN | SYSTOLIC BLOOD PRESSURE: 113 MMHG | HEART RATE: 60 BPM | BODY MASS INDEX: 37.99 KG/M2 | DIASTOLIC BLOOD PRESSURE: 71 MMHG

## 2023-09-19 DIAGNOSIS — R79.82 ELEVATED C-REACTIVE PROTEIN (CRP): ICD-10-CM

## 2023-09-19 DIAGNOSIS — M79.7 FIBROMYALGIA: ICD-10-CM

## 2023-09-19 DIAGNOSIS — S92.411A DISPLACED FRACTURE OF PROXIMAL PHALANX OF RIGHT GREAT TOE, INITIAL ENCOUNTER FOR CLOSED FRACTURE: ICD-10-CM

## 2023-09-19 DIAGNOSIS — M19.90 ARTHRITIS: ICD-10-CM

## 2023-09-19 DIAGNOSIS — Z78.0 POST-MENOPAUSE: ICD-10-CM

## 2023-09-19 DIAGNOSIS — G89.4 CHRONIC PAIN SYNDROME: Primary | ICD-10-CM

## 2023-09-19 LAB
CRP SERPL-MCNC: 14.7 MG/L (ref 0–8.2)
ERYTHROCYTE [SEDIMENTATION RATE] IN BLOOD BY PHOTOMETRIC METHOD: 73 MM/HR (ref 0–36)

## 2023-09-19 PROCEDURE — 86334 PATHOLOGIST INTERPRETATION IFE: ICD-10-PCS | Mod: 26,,, | Performed by: PATHOLOGY

## 2023-09-19 PROCEDURE — 3074F PR MOST RECENT SYSTOLIC BLOOD PRESSURE < 130 MM HG: ICD-10-PCS | Mod: CPTII,GC,S$GLB, | Performed by: STUDENT IN AN ORGANIZED HEALTH CARE EDUCATION/TRAINING PROGRAM

## 2023-09-19 PROCEDURE — 99214 PR OFFICE/OUTPT VISIT, EST, LEVL IV, 30-39 MIN: ICD-10-PCS | Mod: GC,S$GLB,, | Performed by: STUDENT IN AN ORGANIZED HEALTH CARE EDUCATION/TRAINING PROGRAM

## 2023-09-19 PROCEDURE — 1125F PR PAIN SEVERITY QUANTIFIED, PAIN PRESENT: ICD-10-PCS | Mod: CPTII,GC,S$GLB, | Performed by: STUDENT IN AN ORGANIZED HEALTH CARE EDUCATION/TRAINING PROGRAM

## 2023-09-19 PROCEDURE — 99999 PR PBB SHADOW E&M-EST. PATIENT-LVL V: ICD-10-PCS | Mod: PBBFAC,GC,, | Performed by: STUDENT IN AN ORGANIZED HEALTH CARE EDUCATION/TRAINING PROGRAM

## 2023-09-19 PROCEDURE — 1160F PR REVIEW ALL MEDS BY PRESCRIBER/CLIN PHARMACIST DOCUMENTED: ICD-10-PCS | Mod: CPTII,GC,S$GLB, | Performed by: STUDENT IN AN ORGANIZED HEALTH CARE EDUCATION/TRAINING PROGRAM

## 2023-09-19 PROCEDURE — 36415 COLL VENOUS BLD VENIPUNCTURE: CPT | Performed by: INTERNAL MEDICINE

## 2023-09-19 PROCEDURE — 3044F PR MOST RECENT HEMOGLOBIN A1C LEVEL <7.0%: ICD-10-PCS | Mod: CPTII,GC,S$GLB, | Performed by: STUDENT IN AN ORGANIZED HEALTH CARE EDUCATION/TRAINING PROGRAM

## 2023-09-19 PROCEDURE — 3078F DIAST BP <80 MM HG: CPT | Mod: CPTII,GC,S$GLB, | Performed by: STUDENT IN AN ORGANIZED HEALTH CARE EDUCATION/TRAINING PROGRAM

## 2023-09-19 PROCEDURE — 86140 C-REACTIVE PROTEIN: CPT | Performed by: INTERNAL MEDICINE

## 2023-09-19 PROCEDURE — 99999 PR PBB SHADOW E&M-EST. PATIENT-LVL V: CPT | Mod: PBBFAC,GC,, | Performed by: STUDENT IN AN ORGANIZED HEALTH CARE EDUCATION/TRAINING PROGRAM

## 2023-09-19 PROCEDURE — 3078F PR MOST RECENT DIASTOLIC BLOOD PRESSURE < 80 MM HG: ICD-10-PCS | Mod: CPTII,GC,S$GLB, | Performed by: STUDENT IN AN ORGANIZED HEALTH CARE EDUCATION/TRAINING PROGRAM

## 2023-09-19 PROCEDURE — 84165 PATHOLOGIST INTERPRETATION SPE: ICD-10-PCS | Mod: 26,,, | Performed by: PATHOLOGY

## 2023-09-19 PROCEDURE — 3008F BODY MASS INDEX DOCD: CPT | Mod: CPTII,GC,S$GLB, | Performed by: STUDENT IN AN ORGANIZED HEALTH CARE EDUCATION/TRAINING PROGRAM

## 2023-09-19 PROCEDURE — 85652 RBC SED RATE AUTOMATED: CPT | Performed by: INTERNAL MEDICINE

## 2023-09-19 PROCEDURE — 86334 IMMUNOFIX E-PHORESIS SERUM: CPT | Mod: 26,,, | Performed by: PATHOLOGY

## 2023-09-19 PROCEDURE — 1159F PR MEDICATION LIST DOCUMENTED IN MEDICAL RECORD: ICD-10-PCS | Mod: CPTII,GC,S$GLB, | Performed by: STUDENT IN AN ORGANIZED HEALTH CARE EDUCATION/TRAINING PROGRAM

## 2023-09-19 PROCEDURE — G0180 MD CERTIFICATION HHA PATIENT: HCPCS | Mod: ,,, | Performed by: INTERNAL MEDICINE

## 2023-09-19 PROCEDURE — 84165 PROTEIN E-PHORESIS SERUM: CPT | Performed by: INTERNAL MEDICINE

## 2023-09-19 PROCEDURE — 3074F SYST BP LT 130 MM HG: CPT | Mod: CPTII,GC,S$GLB, | Performed by: STUDENT IN AN ORGANIZED HEALTH CARE EDUCATION/TRAINING PROGRAM

## 2023-09-19 PROCEDURE — 1160F RVW MEDS BY RX/DR IN RCRD: CPT | Mod: CPTII,GC,S$GLB, | Performed by: STUDENT IN AN ORGANIZED HEALTH CARE EDUCATION/TRAINING PROGRAM

## 2023-09-19 PROCEDURE — 1159F MED LIST DOCD IN RCRD: CPT | Mod: CPTII,GC,S$GLB, | Performed by: STUDENT IN AN ORGANIZED HEALTH CARE EDUCATION/TRAINING PROGRAM

## 2023-09-19 PROCEDURE — 3008F PR BODY MASS INDEX (BMI) DOCUMENTED: ICD-10-PCS | Mod: CPTII,GC,S$GLB, | Performed by: STUDENT IN AN ORGANIZED HEALTH CARE EDUCATION/TRAINING PROGRAM

## 2023-09-19 PROCEDURE — 99214 OFFICE O/P EST MOD 30 MIN: CPT | Mod: GC,S$GLB,, | Performed by: STUDENT IN AN ORGANIZED HEALTH CARE EDUCATION/TRAINING PROGRAM

## 2023-09-19 PROCEDURE — G0180 PR HOME HEALTH MD CERTIFICATION: ICD-10-PCS | Mod: ,,, | Performed by: INTERNAL MEDICINE

## 2023-09-19 PROCEDURE — 1125F AMNT PAIN NOTED PAIN PRSNT: CPT | Mod: CPTII,GC,S$GLB, | Performed by: STUDENT IN AN ORGANIZED HEALTH CARE EDUCATION/TRAINING PROGRAM

## 2023-09-19 PROCEDURE — 3044F HG A1C LEVEL LT 7.0%: CPT | Mod: CPTII,GC,S$GLB, | Performed by: STUDENT IN AN ORGANIZED HEALTH CARE EDUCATION/TRAINING PROGRAM

## 2023-09-19 PROCEDURE — 84165 PROTEIN E-PHORESIS SERUM: CPT | Mod: 26,,, | Performed by: PATHOLOGY

## 2023-09-19 PROCEDURE — 86334 IMMUNOFIX E-PHORESIS SERUM: CPT | Performed by: INTERNAL MEDICINE

## 2023-09-19 RX ORDER — DICLOFENAC SODIUM 10 MG/G
4 GEL TOPICAL 4 TIMES DAILY
Qty: 150 G | Refills: 5 | Status: ON HOLD | OUTPATIENT
Start: 2023-09-19 | End: 2024-02-19 | Stop reason: HOSPADM

## 2023-09-19 NOTE — PROGRESS NOTES
"Subjective:      Patient ID: Tasha Hawley is a 68 y.o. female.    Chief Complaint: No chief complaint on file.    Initial Presentation  Tasha Hawley is a 68 y.o. F with a past medical history of chronic pain with long-term opioid use, recurrent UTIs, neurogenic bladder w/ suprapubic catheter, lymphedema, debility, chronic diastolic HF, depression, and anxiety who presents today for follow-up. She initially presented to Rheumatology in July 2023.     She has a history of multiple back surgeries. She has undergone previous cervical fusion and several lumbar surgeries. She had an intrathecal opioid pump placed that has provided adequate pain relief. Her pump will need to be replaced with Neurosurgery in the coming weeks. Currently undergoing preop testing. She follows with Pain Management. She uses PRN Vicodin for breakthrough pain.       She has pain in her lower back that radiates to the back of her legs and feet. She also has significant pain and swelling in her hands (specifically the MCPs and DIPs bilaterally) and pain on the dorsal and plantar surfaces of the feet. She believes that the pain in her hands and feet have been ongoing for at least the last 5 years.     Workup for rheumatoid arthritis was negative. 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     Interval History  Two weekends ago she fell in the kitchen, was told she fractured proximal phalanx of R great toe and required admission. She was seen by Podiatry while in the hospital and recommended minimal weightbearing in fracture boot. She will follow-up with her outpatient Podiatrist (Dr. Mcguire).     Today, she is complaining of bilateral feet and low back pain. Her hand pain has improved since she was last seen in July.   She follows with pain management, is on a Dilaudid pain pump and Tramadol and Vicodin for breakthrough pain. She does not feel like this regimen is working currently " because she is experiencing significant pain in her feet. She recently saw pain management but they were unable to make changes to her current regimen.     Rheumatology ROS  (-) fevers, chills (-) weight loss, (+) fatigue, (+) morning stiffness (20-30 min),  (+) headaches 2-3 x week, (-)  vision changes or loss of vision, (-) hx of red eyes including uveitis, iritis, scleritis or episcleritis, (-)  photophobia, (-) dry eyes, (+) dry mouth, (-) rash, (-) photosensitivity, (+) alopecia, (-) mucosal ulcers, (-) Raynaud's  phenomenon, (-) SQ nodules, (-) sense of skin tightening in hands, face or torso, (-)  hx pleurisy, (+) sharp chest pains that increase with deep breath, (-) lung fibrosis, (-) hemoptysis, (-) hx of DVT or PE (-) chest pains, (-) hx pericarditis (-) abdominal pain, (+) nausea, (-) vomiting, (-) diarrhea, (-) constipation, (-) melena,  (-) bloody diarrhea, (-) UC/Crohns, (+) dysphagia, (+) GERD/Reflux, (-) hematuria, proteinuria, (-) renal failure, (-) focal weakness, (-) trouble combing hair or (+) getting out of chairs (uses a wheelchair regularly),  (+) hx of low WBC, low platelets, anemia, (-) hx of pregnancy losses/pre term deliveries/pregnancy complications, (-) genital ulcers    History  Medical:  Active Problem List with Overview Notes    Diagnosis Date Noted    Displaced fracture of proximal phalanx of right great toe, initial encounter for closed fracture 09/13/2023    Idiopathic hypotension 09/12/2023    Fracture, phalanx, foot 09/09/2023    Coronary artery disease involving native coronary artery of native heart with angina pectoris 08/11/2023    Migraine 07/24/2023    Hypothyroid 07/24/2023    History of staph infection 07/24/2023    Chronic, continuous use of opioids 07/24/2023    Chronic systolic heart failure 07/18/2023    Chronic respiratory failure with hypoxia and hypercapnia 07/18/2023    Pulmonary hypertension due to diastolic systemic ventricular dysfunction 07/18/2023     Pulmonary air trapping 07/18/2023    Intractable pain 05/25/2023    BMI 36.0-36.9,adult 05/22/2023    Stage 3a chronic kidney disease 05/22/2023    Preoperative respiratory examination 03/29/2023    Anemia 01/17/2023    Hypokalemia 01/09/2023    Calcaneal spur of left foot 09/17/2022    Charcot ankle, unspecified laterality 09/17/2022    Anxiety 02/05/2022    Cellulitis 07/07/2021    UTI (urinary tract infection) 06/03/2021    Tremor     Physical deconditioning 01/13/2021    Leg pain, bilateral 02/06/2020    Constipation due to opioid therapy 01/30/2020    Pure hypercholesterolemia 01/13/2020    Chronic diastolic heart failure 01/13/2020    Inflammatory spondylopathy of lumbar region 08/29/2019    Mixed stress and urge urinary incontinence 05/13/2019    Recurrent UTI 06/08/2018    Esophageal dysphagia 05/23/2018    Suprapubic catheter 02/01/2018    Insomnia due to medical condition 12/08/2017    Bradycardia 11/14/2017    Bilateral carotid artery disease 11/14/2017    Oropharyngeal dysphagia 07/21/2017    Scoliosis deformity of spine 03/31/2017    S/P insertion of intrathecal pump 03/31/2017    Paresthesia of both lower extremities 03/31/2017    Degenerative disc disease, lumbar 03/31/2017    Osteoarthritis of spine with radiculopathy, lumbar region 03/31/2017    Lymphedema of both lower extremities 03/02/2017    Hypothyroidism (acquired) 02/02/2017    Frequent falls 02/01/2017    Neurogenic bladder 09/27/2016    Drug-induced constipation 08/16/2016    GERD (gastroesophageal reflux disease) 08/16/2016    History of stress test 08/16/2016    Narcotic dependency, continuous 07/18/2014    Congestive heart failure 07/18/2014     Managed with diuretics      Thoracic aorta atherosclerosis 07/18/2014     Per CT chest      SOB (shortness of breath) 05/28/2014    Cervical myelopathy 05/13/2013    Chronic pain syndrome 05/01/2013    Major depressive disorder, recurrent, mild 02/21/2013    Generalized anxiety disorder     Sleep  apnea     Iron deficiency anemia      Surgical:  Past Surgical History:   Procedure Laterality Date    ADENOIDECTOMY      BACK SURGERY      x 12    CARDIAC CATHETERIZATION  2016    subtotalled LAD with right to left collaterals    CATARACT EXTRACTION W/  INTRAOCULAR LENS IMPLANT Left     Dr Coleman     CYSTOSCOPIC LITHOLAPAXY N/A 6/27/2019    Procedure: CYSTOLITHOLAPAXY;  Surgeon: Shireen Mayo MD;  Location: [facility] OR 2ND FLR;  Service: Urology;  Laterality: N/A;    CYSTOSCOPIC LITHOLAPAXY N/A 9/3/2019    Procedure: CYSTOLITHOLAPAXY;  Surgeon: Shireen Mayo MD;  Location: [facility] OR 2ND FLR;  Service: Urology;  Laterality: N/A;    CYSTOSCOPY N/A 7/13/2021    Procedure: CYSTOSCOPY;  Surgeon: Shireen Mayo MD;  Location: [facility] OR 1ST FLR;  Service: Urology;  Laterality: N/A;    CYSTOSCOPY  11/16/2021    Procedure: CYSTOSCOPY;  Surgeon: Shireen Mayo MD;  [facility];  Service: Urology;;    CYSTOSCOPY  7/19/2022    Procedure: CYSTOSCOPY;  Surgeon: Shireen Mayo MD;  [facility];  Service: Urology;;    CYSTOSCOPY WITH INJECTION OF PERIURETHRAL BULKING AGENT  7/19/2022    Procedure: CYSTOSCOPY, WITH PERIURETHRAL BULKING AGENT INJECTION-MACROPLASTIQUE;  Surgeon: Shireen Mayo MD;  [facility];  Service: Urology;;    CYSTOSCOPY WITH INJECTION OF PERIURETHRAL BULKING AGENT N/A 3/28/2023    Procedure: CYSTOSCOPY, WITH PERIURETHRAL BULKING AGENT INJECTION;  Surgeon: Shireen Mayo MD;  [facility];  Service: Urology;  Laterality: N/A;  [place]    CYSTOSCOPY,WITH BOTULINUM TOXIN INJECTION N/A 12/13/2022    Procedure: CYSTOSCOPY,WITH BOTULINUM TOXIN INJECTION;  Surgeon: Shireen Mayo MD;  Location: [facility] OR 1ST FLR;  Service: Urology;  Laterality: N/A;  300 U    CYSTOSCOPY,WITH BOTULINUM TOXIN INJECTION N/A 3/28/2023    Procedure: CYSTOSCOPY,WITH BOTULINUM TOXIN INJECTION;  Surgeon: Shireen Mayo MD;  Location: [facility] OR [address] [place] [address];  Service:  Urology;  Laterality: N/A;  45 MIN.    300 UNITS    ESOPHAGOGASTRODUODENOSCOPY N/A 5/23/2018    Procedure: ESOPHAGOGASTRODUODENOSCOPY (EGD);  Surgeon: Prince Vance MD;  Location: [facility] ENDO (4TH FLR);  Service: Endoscopy;  Laterality: N/A;  r/s 'd per Dr. Vance due to family emergency- ER    ESOPHAGOGASTRODUODENOSCOPY N/A 6/23/2023    Procedure: EGD (ESOPHAGOGASTRODUODENOSCOPY);  Surgeon: Juaquin Barry MD;  Location: [facility] ENDO (2ND FLR);  Service: Endoscopy;  Laterality: N/A;  Refferal: PRINCE VANCE  Order  tele enc 5/18/23  dx: history of a Nissen fundoplication  Additional Scheduling Information:  On home oxygen 2nd floor for airway protection also with a pain pump elevated BMI close to 40   Prep Gastroparesis   ins    HYSTERECTOMY  1975    endometriosis    INJECTION OF BOTULINUM TOXIN TYPE A  7/13/2021    Procedure: INJECTION, BOTULINUM TOXIN, 200units;  Surgeon: Shireen Mayo MD;  Location: [facility] OR 1ST FLR;  Service: Urology;;    INJECTION OF BOTULINUM TOXIN TYPE A  11/16/2021    Procedure: INJECTION, BOTULINUM TOXIN, 200units;  Surgeon: Shireen Mayo MD;  [facility];  Service: Urology;;    INJECTION OF BOTULINUM TOXIN TYPE A  7/19/2022    Procedure: INJECTION, BOTULINUM TOXIN, 300 units ;  Surgeon: Shireen Mayo MD;  [facility];  Service: Urology;;    INSERTION, SUPRAPUBIC CATHETER N/A 12/13/2022    Procedure: INSERTION, SUPRAPUBIC CATHETER;  Surgeon: Shireen Mayo MD;  [facility];  Service: Urology;  Laterality: N/A;  exchange    pain pump placement      SQ Dilaudid Pump managed by Dr. Hillman, Pain Management    REMOVAL OF BONE SPUR OF FOOT Bilateral 9/16/2022    Procedure: EXCISION ARTHRITIC BONE, BILATERAL FOOT;  Surgeon: Adam Mcguire DPM;  Location: [facility] OR;  Service: Podiatry;  Laterality: Bilateral;    REPLACEMENT OF BACLOFEN PUMP N/A 8/2/2023    Procedure: REPLACEMENT, BACLOFEN PUMP;  Surgeon: Smitha Condon MD;   Location: [facility] OR 2ND FLR;  Service: Neurosurgery;  Laterality: N/A;  Anes: Gen  Blood: Type & Screen  Pos: Left Lat  Intrathecal Pump  Bed: Reg Reversed  Rad: C-arm  Pump: 40 cc, [employer]    REPLACEMENT OF CATHETER N/A 10/31/2019    Procedure: REPLACEMENT, CATHETER-SUPRAPUBIC;  Surgeon: Shireen Mayo MD;  Location: [facility] OR 1ST FLR;  Service: Urology;  Laterality: N/A;    SPINAL CORD STIMULATOR REMOVAL      before [place]    SPINE SURGERY  5-13-13    CERVICAL FUSION    TONSILLECTOMY       Social:  Family:  Medication:  Current Outpatient Medications   Medication Sig Dispense Refill    aspirin (ECOTRIN) 81 MG EC tablet Take 1 tablet (81 mg total) by mouth once daily. 90 tablet 3    atorvastatin (LIPITOR) 80 MG tablet Take 1 tablet (80 mg total) by mouth once daily. 90 tablet 3    butalbital-acetaminophen-caffeine -40 mg (FIORICET, ESGIC) -40 mg per tablet Take 1 tablet by mouth daily as needed.      clindamycin (CLEOCIN) 300 MG capsule Take 1 capsule (300 mg total) by mouth every 8 (eight) hours. 30 capsule 0    cyanocobalamin, vitamin B-12, 5,000 mcg Subl Place 1 tablet under the tongue once daily.      darifenacin (ENABLEX) 7.5 MG Tb24 Take 7.5 mg by mouth.      diclofenac sodium (VOLTAREN ARTHRITIS PAIN) 1 % Gel Apply 4 g topically 4 (four) times daily. 150 g 5    docusate sodium (COLACE) 100 MG capsule Take 200 mg by mouth every evening.      ferrous gluconate (FERGON) 324 MG tablet Take 1 tablet (324 mg total) by mouth daily with breakfast. 90 tablet 1    fludrocortisone (FLORINEF) 0.1 mg Tab Take a half-tablet (50 mcg) by mouth once daily 15 tablet 5    FLUoxetine 20 MG capsule Take 3 capsules (60 mg total) by mouth once daily. 270 capsule 3    fluticasone propionate (FLONASE) 50 mcg/actuation nasal spray 2 sprays (100 mcg total) by Each Nostril route once daily. 16 g 0    furosemide (LASIX) 40 MG tablet Take 2 tablets (80 mg total) by mouth 2 (two) times a day. 360 tablet 3     HYDROcodone-acetaminophen (NORCO)  mg per tablet Take 1 tablet by mouth every 6 (six) hours as needed for breakthrough pain.      hydrocortisone (CORTEF) 10 MG Tab Take 1 tablet (10 mg total) by mouth every evening. 30 tablet 5    hydrOXYzine (ATARAX) 50 MG tablet Take 1 tablet (50 mg total) by mouth every 4 (four) hours as needed for Anxiety. 30 tablet 0    intrathecal pain pump compound Hydromorphone (7.5 mg/mL) infusion at 8.59 mg/day (0.3578 mg/hr) out of a total reservoir volume of 40 mL  Pump filled monthly      ketorolac (TORADOL) 10 mg tablet Take 10 mg by mouth daily as needed.      levothyroxine (SYNTHROID) 150 MCG tablet Take 1 tablet (150 mcg total) by mouth before breakfast. 30 tablet 11    LIDOcaine (LIDODERM) 5 % Place 1 patch onto the skin once daily. Remove & Discard patch within 12 hours or as directed by MD  0    liothyronine (CYTOMEL) 5 MCG Tab Take 1 tablet (5 mcg total) by mouth once daily. 30 tablet 11    nitroGLYCERIN (NITROSTAT) 0.4 MG SL tablet Place 0.4 mg under the tongue every 5 (five) minutes as needed for Chest pain.      nystatin (MYCOSTATIN) powder Apply topically 4 (four) times daily. 60 g 0    ondansetron (ZOFRAN-ODT) 4 MG TbDL Take 4 mg by mouth every 4 to 6 hours as needed.      pantoprazole (PROTONIX) 40 MG tablet Take 1 tablet (40 mg total) by mouth once daily. 90 tablet 3    potassium chloride (MICRO-K) 10 MEQ CpSR Take 2 capsules (20 mEq total) by mouth once daily. 60 capsule 11    promethazine (PHENERGAN) 25 MG tablet Take 25 mg by mouth every 6 (six) hours as needed for Nausea.      QUEtiapine (SEROQUEL) 100 MG Tab TAKE 2 TABLETS (200 MG) BY MOUTH NIGHTLY (Patient taking differently: Take 200 mg by mouth nightly.) 180 tablet 3    senna (SENNA LAX) 8.6 mg tablet Take 2 tablets by mouth 2 (two) times daily.      tiotropium bromide (SPIRIVA RESPIMAT) 2.5 mcg/actuation inhaler Inhale 2 puffs into the lungs once daily. Controller 4 g 1    traZODone (DESYREL) 300 MG tablet  Take 1 tablet (300 mg total) by mouth every evening. 90 tablet 3     No current facility-administered medications for this visit.     Imaging/Procedures  XR hands bilaterally (7/18/23):  Left hand: Mild moderate DJD changes most prominent at 1st metacarpal-carpal, 2nd and 3rd MCP joints, less prominent changes IP joints, radioulnar and distal ulnar.  Remote tiny ossicle lateral 1st metacarpal-carpal joint.     Right hand: Mild moderate DJD change particularly 2nd MCP joint, ventral subluxation 2nd and 3rd MCP joints.  Less prominent DJD changes 1st metacarpal-carpal, and 3rd D IP joint, tiny subcutaneous calcification ventral lateral aspect distal tuft 3rd digit additional 3 mm calcification palm aspect of shaft 5th meta carpal.    XR knees bilaterally (7/18/23):  Large body size, atrophy of visualize muscular structures, mild tricompartment DJD particularly medial compartment on left.  No joint effusion.    XR shoulders bilaterally (7/18/23):  Postop ACF visualize cervicothoracic junction, spinal stimulating device craniad tip T8-T9, prominent dextroscoliosis thoracic lumbar junction, 2 cm size air-filled structure overlies lower dorsal spine relative to hiatal hernia deformity noted on CT exam.     Caudal angulation acromion, minimal DJD AC joint, glenohumeral joint appears intact.  No fracture subluxation.    Rheumatologic labs    Component      Latest Ref Rn 7/18/2023   Sed Rate      0 - 36 mm/Hr 43 (H)    CRP      0.0 - 8.2 mg/L 3.7      Component      Latest Ref Rn 7/18/2023   SUAD Screen      Negative <1:80  Negative <1:80      Component      Latest Ref Rn 4/29/2022   Rheumatoid Factor      0.0 - 15.0 IU/mL <13.0      Component      Latest Ref Rng 7/18/2023   CCP Antibodies      <5.0 U/mL <0.5      Component      Latest Ref Rng 7/18/2023   A1AT Proteotype S/Z, LC-MS/MS SEE BELOW    Alpha-1 Anti-Trypsin      100 - 190 mg/dL 152      Rheumatologic medications history  NA    Objective:   /71   Pulse 60   " Ht 5' 4.5" (1.638 m)   Wt 103.4 kg (228 lb)   LMP  (LMP Unknown)   BMI 38.53 kg/m²   Physical Exam   Constitutional: She appears obese.   Using wheelchair; in bilateral soft boots   HENT:   Mouth/Throat: Mucous membranes are moist.   Eyes: Conjunctivae are normal.   Cardiovascular: Normal rate, regular rhythm, normal heart sounds and normal pulses.   Pulmonary/Chest: Effort normal. No respiratory distress.   Pulmonary Comments: Crackles in the lower lung field bilaterally   Abdominal: Soft. Bowel sounds are normal. She exhibits no distension.   Musculoskeletal:         General: Swelling and deformity (Chris deformities on the 2nd and 3rd MCPs bilaterally) present.      Cervical back: Normal range of motion. No rigidity.      Right lower leg: Edema (2-3+) present.      Left lower leg: Edema (2-3+) present.      Comments: Ulnar deviation    Lymphadenopathy:     She has no cervical adenopathy.   Neurological: She is alert.   Skin: Skin is warm.      7/18/2023 9/19/2023   Tender (KRISHNAMURTHY-28) 24 / 28  0 / 28    Swollen (KRISHNAMURTHY-28) 20 / 28  0 / 28    Provider Global 45 mm --   Patient Global 90 mm --   ESR 43 mm/hr 73 mm/hr   CRP 3.7 mg/L 14.7 mg/L   KRISHNAMURTHY-28 (ESR) 7.89 (High disease activity) --   KRISHNAMURTHY-28 (CRP) 6.77 (High disease activity) --   CDAI Score 57.5  --     Assessment:     1. Chronic pain syndrome    2. Arthritis    3. Elevated C-reactive protein (CRP)    4. Displaced fracture of proximal phalanx of right great toe, initial encounter for closed fracture    5. Post-menopause      Plan:     Problem List Items Addressed This Visit          Neuro    Chronic pain syndrome - Primary (Chronic)    Relevant Medications    diclofenac sodium (VOLTAREN ARTHRITIS PAIN) 1 % Gel       Other    Displaced fracture of proximal phalanx of right great toe, initial encounter for closed fracture    Relevant Orders    DXA Bone Density Axial Skeleton 1 or more sites     Other Visit Diagnoses       Arthritis        Relevant Medications    " diclofenac sodium (VOLTAREN ARTHRITIS PAIN) 1 % Gel    Other Relevant Orders    Sedimentation rate (Completed)    C-REACTIVE PROTEIN (Completed)    PROTEIN ELECTROPHORESIS, SERUM (Completed)    IMMUNOFIXATION ELECTROPHORESIS, SERUM    DXA Bone Density Axial Skeleton 1 or more sites    Elevated C-reactive protein (CRP)        Relevant Orders    Sedimentation rate (Completed)    C-REACTIVE PROTEIN (Completed)    PROTEIN ELECTROPHORESIS, SERUM (Completed)    IMMUNOFIXATION ELECTROPHORESIS, SERUM    Post-menopause        Relevant Orders    DXA Bone Density Axial Skeleton 1 or more sites          Tasha Hawley is a 67 y.o. F with a past medical history of chronic pain with long-term opioid use, recurrent UTIs, neurogenic bladder w/ suprapubic catheter, lymphedema, debility, chronic diastolic HF, depression, and anxiety who presents to clinic today for follow-up for joint pain.     Arthritis- prior workup for RA was negative, more consistent with OA, most significant pain today is in her feet, has follow-up with podiatry for recent fracture of proximal phalanx of R great toe    Fibromyalgia- The patient has widespread pain, on both sides of the body, above and below the waist. The patient has multiple associated symptoms with fibromyalgia that include fatigue, brain fog, waking up tired, irritable bowel syndrome, depression, insomnia, anxiety, headaches.   I spent time counseling the patient on fibromyalgia. I explained to the patient that fibromyalgia is a disorder characterized by widespread musculoskeletal pain, accompanied by fatigue, sleep, memory and mood issues. In general, it is thought that fibromyalgia symptoms are secondary to overactive nerves, which amplify painful sensations by the way the brain processes the pain signals.     Elevated ESR- prior lab work showed an elevated ESR (43), CRP was normal     Plan:  - Repeat inflammatory markers today   - Check SPEP and immunofixation for discordance between  ESR and CRP   - Provided patient with prescription for Voltaren gel for her hands and her feet   - Patient has not had a prior DEXA, now more important with recent fracture, ordered     This patient was examined with Dr. Zarate. Plan discussed with the patient. Return to clinic as needed.    Kaitlynn Hoyt MD  Rheumatology Fellow, PGY4

## 2023-09-20 ENCOUNTER — PATIENT MESSAGE (OUTPATIENT)
Dept: RHEUMATOLOGY | Facility: CLINIC | Age: 68
End: 2023-09-20
Payer: MEDICARE

## 2023-09-20 DIAGNOSIS — R79.82 ELEVATED C-REACTIVE PROTEIN (CRP): Primary | ICD-10-CM

## 2023-09-20 DIAGNOSIS — R70.0 ESR RAISED: ICD-10-CM

## 2023-09-20 LAB
ALBUMIN SERPL ELPH-MCNC: 3.66 G/DL (ref 3.35–5.55)
ALPHA1 GLOB SERPL ELPH-MCNC: 0.38 G/DL (ref 0.17–0.41)
ALPHA2 GLOB SERPL ELPH-MCNC: 0.98 G/DL (ref 0.43–0.99)
B-GLOBULIN SERPL ELPH-MCNC: 0.94 G/DL (ref 0.5–1.1)
GAMMA GLOB SERPL ELPH-MCNC: 1.25 G/DL (ref 0.67–1.58)
INTERPRETATION SERPL IFE-IMP: NORMAL
PATHOLOGIST INTERPRETATION IFE: NORMAL
PATHOLOGIST INTERPRETATION SPE: NORMAL
PROT SERPL-MCNC: 7.2 G/DL (ref 6–8.4)

## 2023-09-20 NOTE — PROGRESS NOTES
GERSON attempted to follow up with pt to verify HH admit. No answer. GERSON called and spoke with Brent at [facility]. She confirmed pt was admitted yesterday by a nurse and PT saw her today. GERSON notified OPCM RN. Case closed.

## 2023-09-20 NOTE — PROGRESS NOTES
I have personally reviewed the history, confirmed exam findings, and discussed assessment and plan with fellow.     Will need to recheck ESR and CRP and SPEP SIFE  If still elevated consider WB NM joint and bone scan.  X-rays of hands show erosive OA and changes suggesting CPDD  Diclofenac gel  DXA

## 2023-09-21 ENCOUNTER — LAB VISIT (OUTPATIENT)
Dept: LAB | Facility: HOSPITAL | Age: 68
End: 2023-09-21
Attending: NURSE PRACTITIONER
Payer: MEDICARE

## 2023-09-21 ENCOUNTER — OFFICE VISIT (OUTPATIENT)
Dept: PODIATRY | Facility: CLINIC | Age: 68
End: 2023-09-21
Payer: MEDICARE

## 2023-09-21 ENCOUNTER — TELEPHONE (OUTPATIENT)
Dept: RHEUMATOLOGY | Facility: CLINIC | Age: 68
End: 2023-09-21
Payer: MEDICARE

## 2023-09-21 ENCOUNTER — DOCUMENTATION ONLY (OUTPATIENT)
Dept: REHABILITATION | Facility: HOSPITAL | Age: 68
End: 2023-09-21
Payer: MEDICARE

## 2023-09-21 VITALS
WEIGHT: 227.94 LBS | HEIGHT: 64 IN | DIASTOLIC BLOOD PRESSURE: 69 MMHG | BODY MASS INDEX: 38.91 KG/M2 | TEMPERATURE: 97 F | HEART RATE: 76 BPM | SYSTOLIC BLOOD PRESSURE: 102 MMHG

## 2023-09-21 DIAGNOSIS — D50.9 IRON DEFICIENCY ANEMIA, UNSPECIFIED IRON DEFICIENCY ANEMIA TYPE: ICD-10-CM

## 2023-09-21 DIAGNOSIS — M25.50 ARTHRALGIA, UNSPECIFIED JOINT: Primary | ICD-10-CM

## 2023-09-21 DIAGNOSIS — R70.0 SEDIMENTATION RATE ELEVATION: ICD-10-CM

## 2023-09-21 DIAGNOSIS — R13.12 OROPHARYNGEAL DYSPHAGIA: Primary | ICD-10-CM

## 2023-09-21 DIAGNOSIS — S92.414D CLOSED NONDISPLACED FRACTURE OF PROXIMAL PHALANX OF RIGHT GREAT TOE WITH ROUTINE HEALING, SUBSEQUENT ENCOUNTER: Primary | ICD-10-CM

## 2023-09-21 DIAGNOSIS — M89.8X9 BONE PAIN: ICD-10-CM

## 2023-09-21 DIAGNOSIS — I89.0 LYMPHEDEMA: ICD-10-CM

## 2023-09-21 DIAGNOSIS — M79.671 PAIN IN RIGHT FOOT: ICD-10-CM

## 2023-09-21 LAB
BASOPHILS # BLD AUTO: 0.02 K/UL (ref 0–0.2)
BASOPHILS NFR BLD: 0.4 % (ref 0–1.9)
DIFFERENTIAL METHOD: ABNORMAL
EOSINOPHIL # BLD AUTO: 0.4 K/UL (ref 0–0.5)
EOSINOPHIL NFR BLD: 7.8 % (ref 0–8)
ERYTHROCYTE [DISTWIDTH] IN BLOOD BY AUTOMATED COUNT: 15.5 % (ref 11.5–14.5)
FERRITIN SERPL-MCNC: 209 NG/ML (ref 20–300)
HCT VFR BLD AUTO: 34.6 % (ref 37–48.5)
HGB BLD-MCNC: 11 G/DL (ref 12–16)
IMM GRANULOCYTES # BLD AUTO: 0.01 K/UL (ref 0–0.04)
IMM GRANULOCYTES NFR BLD AUTO: 0.2 % (ref 0–0.5)
IRON SERPL-MCNC: 50 UG/DL (ref 30–160)
LYMPHOCYTES # BLD AUTO: 1.5 K/UL (ref 1–4.8)
LYMPHOCYTES NFR BLD: 30.6 % (ref 18–48)
MCH RBC QN AUTO: 26.3 PG (ref 27–31)
MCHC RBC AUTO-ENTMCNC: 31.8 G/DL (ref 32–36)
MCV RBC AUTO: 83 FL (ref 82–98)
MONOCYTES # BLD AUTO: 0.5 K/UL (ref 0.3–1)
MONOCYTES NFR BLD: 10.6 % (ref 4–15)
NEUTROPHILS # BLD AUTO: 2.5 K/UL (ref 1.8–7.7)
NEUTROPHILS NFR BLD: 50.4 % (ref 38–73)
NRBC BLD-RTO: 0 /100 WBC
PLATELET # BLD AUTO: 316 K/UL (ref 150–450)
PMV BLD AUTO: 9.5 FL (ref 9.2–12.9)
RBC # BLD AUTO: 4.18 M/UL (ref 4–5.4)
SATURATED IRON: 14 % (ref 20–50)
TOTAL IRON BINDING CAPACITY: 345 UG/DL (ref 250–450)
TRANSFERRIN SERPL-MCNC: 233 MG/DL (ref 200–375)
WBC # BLD AUTO: 4.9 K/UL (ref 3.9–12.7)

## 2023-09-21 PROCEDURE — 3288F FALL RISK ASSESSMENT DOCD: CPT | Mod: CPTII,S$GLB,, | Performed by: STUDENT IN AN ORGANIZED HEALTH CARE EDUCATION/TRAINING PROGRAM

## 2023-09-21 PROCEDURE — 3074F PR MOST RECENT SYSTOLIC BLOOD PRESSURE < 130 MM HG: ICD-10-PCS | Mod: CPTII,S$GLB,, | Performed by: STUDENT IN AN ORGANIZED HEALTH CARE EDUCATION/TRAINING PROGRAM

## 2023-09-21 PROCEDURE — 85025 COMPLETE CBC W/AUTO DIFF WBC: CPT | Performed by: NURSE PRACTITIONER

## 2023-09-21 PROCEDURE — 36415 COLL VENOUS BLD VENIPUNCTURE: CPT | Performed by: NURSE PRACTITIONER

## 2023-09-21 PROCEDURE — 83540 ASSAY OF IRON: CPT | Performed by: NURSE PRACTITIONER

## 2023-09-21 PROCEDURE — 3288F PR FALLS RISK ASSESSMENT DOCUMENTED: ICD-10-PCS | Mod: CPTII,S$GLB,, | Performed by: STUDENT IN AN ORGANIZED HEALTH CARE EDUCATION/TRAINING PROGRAM

## 2023-09-21 PROCEDURE — 1100F PR PT FALLS ASSESS DOC 2+ FALLS/FALL W/INJURY/YR: ICD-10-PCS | Mod: CPTII,S$GLB,, | Performed by: STUDENT IN AN ORGANIZED HEALTH CARE EDUCATION/TRAINING PROGRAM

## 2023-09-21 PROCEDURE — 82728 ASSAY OF FERRITIN: CPT | Performed by: NURSE PRACTITIONER

## 2023-09-21 PROCEDURE — 99999 PR PBB SHADOW E&M-EST. PATIENT-LVL V: CPT | Mod: PBBFAC,,, | Performed by: STUDENT IN AN ORGANIZED HEALTH CARE EDUCATION/TRAINING PROGRAM

## 2023-09-21 PROCEDURE — 99213 OFFICE O/P EST LOW 20 MIN: CPT | Mod: 25,S$GLB,, | Performed by: STUDENT IN AN ORGANIZED HEALTH CARE EDUCATION/TRAINING PROGRAM

## 2023-09-21 PROCEDURE — 99213 PR OFFICE/OUTPT VISIT, EST, LEVL III, 20-29 MIN: ICD-10-PCS | Mod: 25,S$GLB,, | Performed by: STUDENT IN AN ORGANIZED HEALTH CARE EDUCATION/TRAINING PROGRAM

## 2023-09-21 PROCEDURE — 3044F HG A1C LEVEL LT 7.0%: CPT | Mod: CPTII,S$GLB,, | Performed by: STUDENT IN AN ORGANIZED HEALTH CARE EDUCATION/TRAINING PROGRAM

## 2023-09-21 PROCEDURE — 99999 PR PBB SHADOW E&M-EST. PATIENT-LVL V: ICD-10-PCS | Mod: PBBFAC,,, | Performed by: STUDENT IN AN ORGANIZED HEALTH CARE EDUCATION/TRAINING PROGRAM

## 2023-09-21 PROCEDURE — 29580 STRAPPING UNNA BOOT: CPT | Mod: 50,S$GLB,, | Performed by: STUDENT IN AN ORGANIZED HEALTH CARE EDUCATION/TRAINING PROGRAM

## 2023-09-21 PROCEDURE — 1159F MED LIST DOCD IN RCRD: CPT | Mod: CPTII,S$GLB,, | Performed by: STUDENT IN AN ORGANIZED HEALTH CARE EDUCATION/TRAINING PROGRAM

## 2023-09-21 PROCEDURE — 3078F DIAST BP <80 MM HG: CPT | Mod: CPTII,S$GLB,, | Performed by: STUDENT IN AN ORGANIZED HEALTH CARE EDUCATION/TRAINING PROGRAM

## 2023-09-21 PROCEDURE — 29580 PR STRAPPING UNNA BOOT: ICD-10-PCS | Mod: 50,S$GLB,, | Performed by: STUDENT IN AN ORGANIZED HEALTH CARE EDUCATION/TRAINING PROGRAM

## 2023-09-21 PROCEDURE — 3008F PR BODY MASS INDEX (BMI) DOCUMENTED: ICD-10-PCS | Mod: CPTII,S$GLB,, | Performed by: STUDENT IN AN ORGANIZED HEALTH CARE EDUCATION/TRAINING PROGRAM

## 2023-09-21 PROCEDURE — 3074F SYST BP LT 130 MM HG: CPT | Mod: CPTII,S$GLB,, | Performed by: STUDENT IN AN ORGANIZED HEALTH CARE EDUCATION/TRAINING PROGRAM

## 2023-09-21 PROCEDURE — 1100F PTFALLS ASSESS-DOCD GE2>/YR: CPT | Mod: CPTII,S$GLB,, | Performed by: STUDENT IN AN ORGANIZED HEALTH CARE EDUCATION/TRAINING PROGRAM

## 2023-09-21 PROCEDURE — 1159F PR MEDICATION LIST DOCUMENTED IN MEDICAL RECORD: ICD-10-PCS | Mod: CPTII,S$GLB,, | Performed by: STUDENT IN AN ORGANIZED HEALTH CARE EDUCATION/TRAINING PROGRAM

## 2023-09-21 PROCEDURE — 3044F PR MOST RECENT HEMOGLOBIN A1C LEVEL <7.0%: ICD-10-PCS | Mod: CPTII,S$GLB,, | Performed by: STUDENT IN AN ORGANIZED HEALTH CARE EDUCATION/TRAINING PROGRAM

## 2023-09-21 PROCEDURE — 1125F PR PAIN SEVERITY QUANTIFIED, PAIN PRESENT: ICD-10-PCS | Mod: CPTII,S$GLB,, | Performed by: STUDENT IN AN ORGANIZED HEALTH CARE EDUCATION/TRAINING PROGRAM

## 2023-09-21 PROCEDURE — 1125F AMNT PAIN NOTED PAIN PRSNT: CPT | Mod: CPTII,S$GLB,, | Performed by: STUDENT IN AN ORGANIZED HEALTH CARE EDUCATION/TRAINING PROGRAM

## 2023-09-21 PROCEDURE — 3078F PR MOST RECENT DIASTOLIC BLOOD PRESSURE < 80 MM HG: ICD-10-PCS | Mod: CPTII,S$GLB,, | Performed by: STUDENT IN AN ORGANIZED HEALTH CARE EDUCATION/TRAINING PROGRAM

## 2023-09-21 PROCEDURE — 84466 ASSAY OF TRANSFERRIN: CPT | Performed by: NURSE PRACTITIONER

## 2023-09-21 PROCEDURE — 3008F BODY MASS INDEX DOCD: CPT | Mod: CPTII,S$GLB,, | Performed by: STUDENT IN AN ORGANIZED HEALTH CARE EDUCATION/TRAINING PROGRAM

## 2023-09-21 RX ORDER — AMITRIPTYLINE HYDROCHLORIDE 25 MG/1
25 TABLET, FILM COATED ORAL NIGHTLY
Qty: 30 TABLET | Refills: 11 | Status: SHIPPED | OUTPATIENT
Start: 2023-09-21 | End: 2023-09-28

## 2023-09-21 NOTE — PROGRESS NOTES
No Show Note/Documentation    Patient: Tasha Hawley  Date of Session: 9/21/2023  Diagnosis:   Encounter Diagnosis   Name Primary?    Oropharyngeal dysphagia Yes     MRN: [id number]    Tasha Hawley did not attend her  scheduled therapy appointment today. She did not call to cancel nor reschedule. This is the 2nd appointment that she has not attended. Next appointment is scheduled for 9/28/2023 and will follow up with patient at that time. No charges have been posted today.     Thang Ordoñez M.S., CF-SLP  Speech-Language Pathologist  9/21/2023

## 2023-09-21 NOTE — PROGRESS NOTES
Subjective:     Patient    Tasha Hawley is a 68 y.o. female.    Problem    Previously followed outpatient by Dr Mcguire. 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 Has ongoing right 1st toe pain, poorly controlled on opioids. Uses surgical shoes. Also has home health wrapping her legs every other day for lymphedema, does not have a physician following her for lymphedema.     History    History obtained from patient and review of medical records.     Past Medical History:   Diagnosis Date    Anxiety     Arthritis     Bilateral lower extremity edema     severe chronic    Carotid artery occlusion     Cataract     CHF (congestive heart failure)     Coronary artery disease     subtotalled LAD with collateral    Depression     Fever blister     Hard of hearing     Hypokalemia 01/09/2023    Hyponatremia 02/04/2022    Hypothyroid     Iron deficiency anemia     Lumbar radiculopathy     with chronic pain    Ocular migraine     Other emphysema 05/22/2023    Renal disorder     Sleep apnea     cpap       Past Surgical History:   Procedure Laterality Date    ADENOIDECTOMY      BACK SURGERY      x 12    CARDIAC CATHETERIZATION  2016    subtotalled LAD with right to left collaterals    CATARACT EXTRACTION W/  INTRAOCULAR LENS IMPLANT Left     Dr Coleman     CYSTOSCOPIC LITHOLAPAXY N/A 6/27/2019    Procedure: CYSTOLITHOLAPAXY;  Surgeon: Shireen Mayo MD;  Location: [facility] OR [address] [place] [address];  Service: Urology;  Laterality: N/A;    CYSTOSCOPIC LITHOLAPAXY N/A 9/3/2019    Procedure: CYSTOLITHOLAPAXY;  Surgeon: Shireen Mayo MD;  Location: [facility] OR [address] [place] [address];  Service: Urology;  Laterality: N/A;    CYSTOSCOPY N/A 7/13/2021    Procedure: CYSTOSCOPY;  Surgeon: Shireen Mayo MD;  [facility];  Service: Urology;  Laterality: N/A;    CYSTOSCOPY  11/16/2021    Procedure: CYSTOSCOPY;  Surgeon: Shireen Mayo MD;   [facility];  Service: Urology;;    CYSTOSCOPY  7/19/2022    Procedure: CYSTOSCOPY;  Surgeon: Shireen Mayo MD;  [facility];  Service: Urology;;    CYSTOSCOPY WITH INJECTION OF PERIURETHRAL BULKING AGENT  7/19/2022    Procedure: CYSTOSCOPY, WITH PERIURETHRAL BULKING AGENT INJECTION-MACROPLASTIQUE;  Surgeon: Shireen Mayo MD;  [facility];  Service: Urology;;    CYSTOSCOPY WITH INJECTION OF PERIURETHRAL BULKING AGENT N/A 3/28/2023    Procedure: CYSTOSCOPY, WITH PERIURETHRAL BULKING AGENT INJECTION;  Surgeon: Shireen Mayo MD;  [facility];  Service: Urology;  Laterality: N/A;  [place]    CYSTOSCOPY,WITH BOTULINUM TOXIN INJECTION N/A 12/13/2022    Procedure: CYSTOSCOPY,WITH BOTULINUM TOXIN INJECTION;  Surgeon: Shireen Mayo MD;  [facility];  Service: Urology;  Laterality: N/A;  300 U    CYSTOSCOPY,WITH BOTULINUM TOXIN INJECTION N/A 3/28/2023    Procedure: CYSTOSCOPY,WITH BOTULINUM TOXIN INJECTION;  Surgeon: Shireen Mayo MD;  [facility];  Service: Urology;  Laterality: N/A;  45 MIN.    300 UNITS    ESOPHAGOGASTRODUODENOSCOPY N/A 5/23/2018    Procedure: ESOPHAGOGASTRODUODENOSCOPY (EGD);  Surgeon: Prince Vance MD;  Location: [facility] (4TH FLR);  Service: Endoscopy;  Laterality: N/A;  r/s 'd per Dr. Vance due to family emergency- ER    ESOPHAGOGASTRODUODENOSCOPY N/A 6/23/2023    Procedure: EGD (ESOPHAGOGASTRODUODENOSCOPY);  Surgeon: Juaquin Barry MD;  Location: [facility] ENDO (2ND FLR);  Service: Endoscopy;  Laterality: N/A;  Refferal: PRINCE VANCE  tele enc 5/18/23  dx: history of a Nissen fundoplication  Additional Scheduling Information:  On home oxygen 2nd floor for airway protection also with a pain pump elevated BMI close to 40   Prep Gastroparesis   ins    HYSTERECTOMY  1975    endometriosis    INJECTION OF BOTULINUM TOXIN TYPE A  7/13/2021    Procedure: INJECTION, BOTULINUM TOXIN,  200units;  Surgeon: Shireen Mayo MD;  Location: [facility] OR [address] [place] [address];  Service: Urology;;    INJECTION OF BOTULINUM TOXIN TYPE A  11/16/2021    Procedure: INJECTION, BOTULINUM TOXIN, 200units;  Surgeon: Shireen Mayo MD;  [facility];  Service: Urology;;    INJECTION OF BOTULINUM TOXIN TYPE A  7/19/2022    Procedure: INJECTION, BOTULINUM TOXIN, 300 units ;  Surgeon: Shireen Mayo MD;  Location: [facility] OR [address] [place] [address];  Service: Urology;;    INSERTION, SUPRAPUBIC CATHETER N/A 12/13/2022    Procedure: INSERTION, SUPRAPUBIC CATHETER;  Surgeon: Shireen Mayo MD;  Location: [facility] OR [address] [place] [address];  Service: Urology;  Laterality: N/A;  exchange    pain pump placement      SQ Dilaudid Pump managed by Dr. Hillman, Pain Management    REMOVAL OF BONE SPUR OF FOOT Bilateral 9/16/2022    Procedure: EXCISION ARTHRITIC BONE, BILATERAL FOOT;  Surgeon: Adam Mcguire [facility]  Location: [facility];  Service: Podiatry;  Laterality: Bilateral;    REPLACEMENT OF BACLOFEN PUMP N/A 8/2/2023    Procedure: REPLACEMENT, BACLOFEN PUMP;  Surgeon: Smitha Condon MD;  Location: [facility] OR [address] [place] [address];  Service: Neurosurgery;  Laterality: N/A;  Anes: Gen  Blood: Type & Screen  Pos: Left Lat  Intrathecal Pump  Bed: Reg Reversed  Rad: C-arm  Pump: 40 cc, [employer]    REPLACEMENT OF CATHETER N/A 10/31/2019    Procedure: REPLACEMENT, CATHETER-SUPRAPUBIC;  Surgeon: Shireen Mayo MD;  Location: [facility] OR [address] [place] [address];  Service: Urology;  Laterality: N/A;    SPINAL CORD STIMULATOR REMOVAL      before Larissa    SPINE SURGERY  5-13-13    CERVICAL FUSION    TONSILLECTOMY          Objective:     Vitals  Wt Readings from Last 1 Encounters:   09/21/23 103.4 kg (227 lb 15.3 oz)     Temp Readings from Last 1 Encounters:   09/21/23 96.6 °F (35.9 °C) (Oral)     BP Readings from Last 1 Encounters:   09/21/23 102/69     Pulse Readings from Last 1 Encounters:   09/21/23 76       Dermatological Exam    Skin:  Pedal hair growth diminished and Pedal skin thin  and shiny on right  Pedal hair growth diminished and Pedal skin thin and shiny on left    Nails:  10 nail(s) thickened and 10 nail(s) discolored    Vascular Exam    Arteries:  Posterior tibial artery nonpalpable on right  Dorsalis pedis artery palpable on right  Posterior tibial artery nonpalpable on left  Dorsalis pedis artery palpable on left    Veins:  Telangectasia on right  Telangectasia on left    Swellin+ nonpitting on right  2+ nonpitting on left    Neurological Exam    [place] touch test:  6/6 sites sensed, light touch intact     Musculoskeletal Exam    Footwear:  Surgical shoe on right  Surgical shoe on left    Gait Exam:   Ambulatory Status: Ambulatory  Gait: Normal  Assistive Devices: None    Foot Progression Angle:  Normal on right  Normal on left     Right Lower Extremity Additional Findings:  Right foot and ankle function, strength, and range of motion unremarkable except as noted above.     Left Lower Extremity Additional Findings:  Left foot and ankle function, strength, and range of motion unremarkable except as noted above.    Imaging and Other Tests    Imaging:  Independently reviewed and interpreted imaging, findings are as follows: N/A     Assessment:     Encounter Diagnoses   Name Primary?    Closed nondisplaced fracture of proximal phalanx of right great toe with routine healing, subsequent encounter Yes    Pain in right foot     Lymphedema         Plan:     I counseled the patient on her conditions, their implications and medical management.    Right 1st toe fracture, pain: acute  -Ordered amitriptyline.   -Will recheck every 3-4 weeks. Anticipate about 1-2 months more of healing.   -Protected WB in surgical shoe.     Lymphedema: chronic stable  -Bilateral unna boot multilayer compression dressings applied to lower legs.   -Continue HH compression dressings every other day.     Return to clinic in 1 month, call sooner PRN.

## 2023-09-22 ENCOUNTER — OFFICE VISIT (OUTPATIENT)
Dept: INTERNAL MEDICINE | Facility: CLINIC | Age: 68
End: 2023-09-22
Payer: MEDICARE

## 2023-09-22 VITALS
WEIGHT: 227.94 LBS | OXYGEN SATURATION: 95 % | HEART RATE: 65 BPM | HEIGHT: 64 IN | BODY MASS INDEX: 38.91 KG/M2 | SYSTOLIC BLOOD PRESSURE: 104 MMHG | DIASTOLIC BLOOD PRESSURE: 62 MMHG

## 2023-09-22 DIAGNOSIS — J96.11 CHRONIC RESPIRATORY FAILURE WITH HYPOXIA AND HYPERCAPNIA: ICD-10-CM

## 2023-09-22 DIAGNOSIS — I89.0 LYMPHEDEMA: Primary | ICD-10-CM

## 2023-09-22 DIAGNOSIS — I87.333 CHRONIC VENOUS HYPERTENSION (IDIOPATHIC) WITH ULCER AND INFLAMMATION OF BILATERAL LOWER EXTREMITY: ICD-10-CM

## 2023-09-22 DIAGNOSIS — J96.12 CHRONIC RESPIRATORY FAILURE WITH HYPOXIA AND HYPERCAPNIA: ICD-10-CM

## 2023-09-22 DIAGNOSIS — G89.4 CHRONIC PAIN SYNDROME: Chronic | ICD-10-CM

## 2023-09-22 DIAGNOSIS — G47.01 INSOMNIA DUE TO MEDICAL CONDITION: Chronic | ICD-10-CM

## 2023-09-22 DIAGNOSIS — E03.9 HYPOTHYROIDISM (ACQUIRED): ICD-10-CM

## 2023-09-22 DIAGNOSIS — F11.20 NARCOTIC DEPENDENCY, CONTINUOUS: Chronic | ICD-10-CM

## 2023-09-22 DIAGNOSIS — I89.0 LYMPHEDEMA OF BOTH LOWER EXTREMITIES: ICD-10-CM

## 2023-09-22 DIAGNOSIS — G47.30 SLEEP APNEA, UNSPECIFIED TYPE: ICD-10-CM

## 2023-09-22 PROBLEM — M46.96 INFLAMMATORY SPONDYLOPATHY OF LUMBAR REGION: Status: RESOLVED | Noted: 2019-08-29 | Resolved: 2023-09-22

## 2023-09-22 PROCEDURE — 3288F PR FALLS RISK ASSESSMENT DOCUMENTED: ICD-10-PCS | Mod: CPTII,S$GLB,, | Performed by: INTERNAL MEDICINE

## 2023-09-22 PROCEDURE — 3044F PR MOST RECENT HEMOGLOBIN A1C LEVEL <7.0%: ICD-10-PCS | Mod: CPTII,S$GLB,, | Performed by: INTERNAL MEDICINE

## 2023-09-22 PROCEDURE — 90694 FLU VACCINE - QUADRIVALENT - ADJUVANTED: ICD-10-PCS | Mod: S$GLB,,, | Performed by: INTERNAL MEDICINE

## 2023-09-22 PROCEDURE — G0009 PNEUMOCOCCAL CONJUGATE VACCINE 20-VALENT: ICD-10-PCS | Mod: S$GLB,,, | Performed by: INTERNAL MEDICINE

## 2023-09-22 PROCEDURE — G0008 ADMIN INFLUENZA VIRUS VAC: HCPCS | Mod: S$GLB,,, | Performed by: INTERNAL MEDICINE

## 2023-09-22 PROCEDURE — 99999 PR PBB SHADOW E&M-EST. PATIENT-LVL V: CPT | Mod: PBBFAC,,, | Performed by: INTERNAL MEDICINE

## 2023-09-22 PROCEDURE — 3288F FALL RISK ASSESSMENT DOCD: CPT | Mod: CPTII,S$GLB,, | Performed by: INTERNAL MEDICINE

## 2023-09-22 PROCEDURE — 1160F PR REVIEW ALL MEDS BY PRESCRIBER/CLIN PHARMACIST DOCUMENTED: ICD-10-PCS | Mod: CPTII,S$GLB,, | Performed by: INTERNAL MEDICINE

## 2023-09-22 PROCEDURE — 3078F DIAST BP <80 MM HG: CPT | Mod: CPTII,S$GLB,, | Performed by: INTERNAL MEDICINE

## 2023-09-22 PROCEDURE — 1159F PR MEDICATION LIST DOCUMENTED IN MEDICAL RECORD: ICD-10-PCS | Mod: CPTII,S$GLB,, | Performed by: INTERNAL MEDICINE

## 2023-09-22 PROCEDURE — 99999 PR PBB SHADOW E&M-EST. PATIENT-LVL V: ICD-10-PCS | Mod: PBBFAC,,, | Performed by: INTERNAL MEDICINE

## 2023-09-22 PROCEDURE — 3044F HG A1C LEVEL LT 7.0%: CPT | Mod: CPTII,S$GLB,, | Performed by: INTERNAL MEDICINE

## 2023-09-22 PROCEDURE — 90677 PCV20 VACCINE IM: CPT | Mod: S$GLB,,, | Performed by: INTERNAL MEDICINE

## 2023-09-22 PROCEDURE — 1101F PT FALLS ASSESS-DOCD LE1/YR: CPT | Mod: CPTII,S$GLB,, | Performed by: INTERNAL MEDICINE

## 2023-09-22 PROCEDURE — 90677 PNEUMOCOCCAL CONJUGATE VACCINE 20-VALENT: ICD-10-PCS | Mod: S$GLB,,, | Performed by: INTERNAL MEDICINE

## 2023-09-22 PROCEDURE — 1159F MED LIST DOCD IN RCRD: CPT | Mod: CPTII,S$GLB,, | Performed by: INTERNAL MEDICINE

## 2023-09-22 PROCEDURE — 1125F PR PAIN SEVERITY QUANTIFIED, PAIN PRESENT: ICD-10-PCS | Mod: CPTII,S$GLB,, | Performed by: INTERNAL MEDICINE

## 2023-09-22 PROCEDURE — 99215 OFFICE O/P EST HI 40 MIN: CPT | Mod: 25,S$GLB,, | Performed by: INTERNAL MEDICINE

## 2023-09-22 PROCEDURE — 3078F PR MOST RECENT DIASTOLIC BLOOD PRESSURE < 80 MM HG: ICD-10-PCS | Mod: CPTII,S$GLB,, | Performed by: INTERNAL MEDICINE

## 2023-09-22 PROCEDURE — 3074F SYST BP LT 130 MM HG: CPT | Mod: CPTII,S$GLB,, | Performed by: INTERNAL MEDICINE

## 2023-09-22 PROCEDURE — G0008 FLU VACCINE - QUADRIVALENT - ADJUVANTED: ICD-10-PCS | Mod: S$GLB,,, | Performed by: INTERNAL MEDICINE

## 2023-09-22 PROCEDURE — 1101F PR PT FALLS ASSESS DOC 0-1 FALLS W/OUT INJ PAST YR: ICD-10-PCS | Mod: CPTII,S$GLB,, | Performed by: INTERNAL MEDICINE

## 2023-09-22 PROCEDURE — 3074F PR MOST RECENT SYSTOLIC BLOOD PRESSURE < 130 MM HG: ICD-10-PCS | Mod: CPTII,S$GLB,, | Performed by: INTERNAL MEDICINE

## 2023-09-22 PROCEDURE — 1125F AMNT PAIN NOTED PAIN PRSNT: CPT | Mod: CPTII,S$GLB,, | Performed by: INTERNAL MEDICINE

## 2023-09-22 PROCEDURE — G0009 ADMIN PNEUMOCOCCAL VACCINE: HCPCS | Mod: S$GLB,,, | Performed by: INTERNAL MEDICINE

## 2023-09-22 PROCEDURE — 90694 VACC AIIV4 NO PRSRV 0.5ML IM: CPT | Mod: S$GLB,,, | Performed by: INTERNAL MEDICINE

## 2023-09-22 PROCEDURE — 3008F PR BODY MASS INDEX (BMI) DOCUMENTED: ICD-10-PCS | Mod: CPTII,S$GLB,, | Performed by: INTERNAL MEDICINE

## 2023-09-22 PROCEDURE — 3008F BODY MASS INDEX DOCD: CPT | Mod: CPTII,S$GLB,, | Performed by: INTERNAL MEDICINE

## 2023-09-22 PROCEDURE — 1160F RVW MEDS BY RX/DR IN RCRD: CPT | Mod: CPTII,S$GLB,, | Performed by: INTERNAL MEDICINE

## 2023-09-22 PROCEDURE — 99215 PR OFFICE/OUTPT VISIT, EST, LEVL V, 40-54 MIN: ICD-10-PCS | Mod: 25,S$GLB,, | Performed by: INTERNAL MEDICINE

## 2023-09-22 RX ORDER — LEVOTHYROXINE SODIUM 150 UG/1
150 TABLET ORAL
Qty: 90 TABLET | Refills: 3 | Status: SHIPPED | OUTPATIENT
Start: 2023-09-22 | End: 2024-09-21

## 2023-09-22 RX ORDER — TRAMADOL HYDROCHLORIDE 50 MG/1
50 TABLET ORAL EVERY 4 HOURS PRN
COMMUNITY
Start: 2023-09-18 | End: 2023-09-28

## 2023-09-22 NOTE — PROGRESS NOTES
"Subjective:       Patient ID: Tasha Hawley is a 68 y.o. female.    Chief Complaint:   Follow-up    HPI chronically ill patient of mine admitted to the hospital September 9th through 14th for bilateral lower extremity cellulitis in the setting of a toe fracture.  She saw Podiatry yesterday.  Told to be weight-bearing as tolerated.  Wear shoes and keep feet elevated when at home.  She continues to complain of ongoing insomnia and fatigue "all the time."  This is a chronic intermittent condition that has proven difficult to treat.  She uses her CPAP and her sleep medications.  She will take the flu and pneumonia vaccination today.  She is not a smoker.  Lab work from hospitalization reviewed.  No new lab needed.    PMH:  Neurogenic bladder, with recurrent UTI's. Followed by urogyn (Milena).  Suprapubic catheter  CAD, with ACS in Jan 2016 - subtot mid LAD lesion without PCI; ischemic CM with EF 40% and chronic LE edema. Followed by [facility] cardiology  Chronic insomnia  Lymphedema bilateral LE's  Intermittent nausea  Chronic low back pain with narcotic use.  Indwelling narcotic pump  History CVA with dysarthria and swallowing difficulty  Hypothyroidism, stable  CECI, on CPAP  Anxiety and Depression  Chronic constipation, d/t ongoing narcotic use.  Dependent on home oxygen     Meds: Reviewed and reconciled in EPIC with patient during visit today.     Review of Systems   Constitutional:  Negative for fever.   HENT:  Negative for congestion.    Respiratory:  Positive for shortness of breath.    Cardiovascular:  Positive for leg swelling.   Gastrointestinal:  Negative for abdominal pain.   Genitourinary:  Positive for difficulty urinating.   Musculoskeletal:  Positive for back pain.   Skin:  Negative for rash.   Neurological:  Negative for headaches.   Psychiatric/Behavioral:  Positive for sleep disturbance.        Objective:      Physical Exam  Constitutional:       Comments: Ill-appearing woman with hearing loss.  " Seated in wheelchair   HENT:      Head: Normocephalic and atraumatic.   Pulmonary:      Effort: Pulmonary effort is normal.   Neurological:      General: No focal deficit present.      Mental Status: She is alert.   Psychiatric:         Mood and Affect: Mood normal.         Assessment:       1. Lymphedema    2. Insomnia due to medical condition    3. Chronic pain syndrome    4. Narcotic dependency, continuous    5. Sleep apnea, unspecified type    6. Lymphedema of both lower extremities    7. Chronic respiratory failure with hypoxia and hypercapnia    8. Chronic venous hypertension (idiopathic) with ulcer and inflammation of bilateral lower extremity    9. Hypothyroidism (acquired)        Plan:       Tasha was seen today for follow-up.    Diagnoses and all orders for this visit:    Lymphedema - she requested referral to lymphedema clinic.  This is chronic and longstanding; will see if they can help  -     Ambulatory referral/consult to Physical/Occupational Therapy; Future    Insomnia due to medical condition - chronic.  discussed use of quetiapine and trazodone, both of which she has at home.  Encouraged adherence to pap    Chronic pain syndrome - stable today.  Continue present care    Narcotic dependency, continuous    Sleep apnea, unspecified type    Lymphedema of both lower extremities - as above    Chronic respiratory failure with hypoxia and hypercapnia - stable today, not requiring oxygen to maintain sats.  Prevnar given  -     (In Office Administered) Pneumococcal Conjugate Vaccine (20 Valent) (IM)    Chronic venous hypertension (idiopathic) with ulcer and inflammation of bilateral lower extremity    Hypothyroidism (acquired)- reviewed medications; adjusted dose to 150.  Unclear why she needs the cytomel  -     levothyroxine (SYNTHROID) 150 MCG tablet; Take 1 tablet (150 mcg total) by mouth before breakfast.    Other orders  -     Influenza - Quadrivalent (Adjuvanted)    Rtc prn, or in 3 months    PAPO Bhatia  MD Carroll MPH  Staff Internist

## 2023-09-25 NOTE — TELEPHONE ENCOUNTER
Order sheet done for cystoscopy Botox injection of the bladder 300 u and urethral bulking agent.

## 2023-09-26 ENCOUNTER — TELEPHONE (OUTPATIENT)
Dept: UROLOGY | Facility: CLINIC | Age: 68
End: 2023-09-26
Payer: MEDICARE

## 2023-09-26 ENCOUNTER — TELEPHONE (OUTPATIENT)
Dept: NEUROSURGERY | Facility: CLINIC | Age: 68
End: 2023-09-26
Payer: MEDICARE

## 2023-09-26 DIAGNOSIS — N39.3 STRESS INCONTINENCE: ICD-10-CM

## 2023-09-26 DIAGNOSIS — N31.9 NEUROGENIC BLADDER: Primary | ICD-10-CM

## 2023-09-26 NOTE — TELEPHONE ENCOUNTER
I spk w pt who did not feel like they needed to come in for a neurosurgery post op appointment and cancelled the appt she had scheduled already as she states that she is doing fine and doesn't need to come in. Pt did not want to reschedule her appointment. I requested that she send us photos of her incision and explained the process. Pt would try but did not think it would be possible

## 2023-09-27 ENCOUNTER — TELEPHONE (OUTPATIENT)
Dept: ENDOSCOPY | Facility: HOSPITAL | Age: 68
End: 2023-09-27
Payer: MEDICARE

## 2023-09-27 NOTE — TELEPHONE ENCOUNTER
----- Message from Racheal Jorge sent at 9/27/2023 10:19 AM CDT -----  Regarding: appt  Contact: [phone]  Pt calling again for an appt woth the provider would like to be seen today . No available dates, Pt also would rosina to be seen for unable to swallow. Please call

## 2023-09-27 NOTE — TELEPHONE ENCOUNTER
----- Message from Felicity Nixon sent at 9/27/2023  8:37 AM CDT -----  Regarding: appt access  Contact: pt [phone]  PATIENT CALL    Pt reports that her dysphagia hasn't gotten better and would like to be seen as soon as possible. She says that she was told that she has some kind of throat obstruction and needs to see her GI provider. No appts in [facility], please call pt at [phone] to schedule soonest appt.

## 2023-09-27 NOTE — TELEPHONE ENCOUNTER
She says she is having problems swallowing and breathing.  I told her she needs to go to ER if she is having problems breathing. She then downplayed that saying she has her oxygen and she is ok.  She says that she will come anytime to see you.

## 2023-09-28 ENCOUNTER — TELEPHONE (OUTPATIENT)
Dept: PODIATRY | Facility: CLINIC | Age: 68
End: 2023-09-28
Payer: MEDICARE

## 2023-09-28 ENCOUNTER — DOCUMENTATION ONLY (OUTPATIENT)
Dept: REHABILITATION | Facility: HOSPITAL | Age: 68
End: 2023-09-28
Payer: MEDICARE

## 2023-09-28 DIAGNOSIS — R13.12 OROPHARYNGEAL DYSPHAGIA: Primary | ICD-10-CM

## 2023-09-28 DIAGNOSIS — M79.671 PAIN IN RIGHT FOOT: Primary | ICD-10-CM

## 2023-09-28 RX ORDER — TRAMADOL HYDROCHLORIDE 50 MG/1
50 TABLET ORAL EVERY 4 HOURS PRN
Qty: 30 TABLET | Refills: 0 | Status: SHIPPED | OUTPATIENT
Start: 2023-09-28 | End: 2023-10-02 | Stop reason: SDUPTHER

## 2023-09-28 RX ORDER — HYDROCODONE BITARTRATE AND ACETAMINOPHEN 10; 325 MG/1; MG/1
1 TABLET ORAL EVERY 6 HOURS PRN
Qty: 30 TABLET | Refills: 0 | Status: SHIPPED | OUTPATIENT
Start: 2023-09-28 | End: 2023-12-28 | Stop reason: SDUPTHER

## 2023-09-28 NOTE — TELEPHONE ENCOUNTER
----- Message from Yulia Lynn MA sent at 9/27/2023  2:30 PM CDT -----  Contact: pt    ----- Message -----  From: Valentino Esquivel  Sent: 9/27/2023  12:15 PM CDT  To: Tere CARREON Staff    Pt requesting call back re: would like to know if she can stop taking rx below due it making her feel tired and depressed please call     Confirmed contact below:  Contact Name:Tasha Hawley  Phone Number: [phone]

## 2023-09-28 NOTE — PROGRESS NOTES
Outpatient Therapy Discharge Summary   Discharge Date: 9/28/2023   Name: Tasha Hawley  Clinic Number: [id number]  Therapy Diagnosis: Oropharyngeal dysphagia  Physician: Prince Vance MD  Physician Orders: Ambulatory Referral to Speech Therapy   Medical Diagnosis: Pharyngeal disorder [J39.2], Dysphagia, unspecified type [R13.10]  Evaluation Date: 8/14/2023    Date of Last visit: 9/07/2023  Total Visits Received: Visits never authorized  Cancelled Visits: 0  No Show Visits: 3    Assessment    Assessment of Current Status: Patient attended 1 scheduled visit with 3 no shows to date. Due limited attendance to speech therapy sessions, patient did not progress in accomplishing established goals within her plan of care. However, patient and  received education and instruction on swallowing strategies and exercises for improving the safety and efficiency of swallow. Patient noted to have requested home health services for nursing, physical therapy, and occupational therapy 9/18/2023.    Goals:   Patient will complete effortful swallow 60-90x per session to improve tongue base retraction. NOT MET  Patient will complete mendelsohn maneuver 30-45x per session to improve laryngeal vestibule closure. NOT MET  Patient will complete Chin Tuck Against Resistance (CTAR) isometric hold 3x 1 minute. NOT MET  Patient will complete Chin Tuck Against Resistance (CTAR) repetition 30-90x per session NOT MET    Long Term Goals:   She  will maintain adequate hydration/nutrition with optimum safety and efficiency of swallowing function on PO intake without overt signs and symptoms of aspiration for soft and bite size diet level.    She  will utilize compensatory strategies with optimum safety and efficiency of swallowing function on PO intake without overt signs and symptoms of aspiration for soft and bite size diet level.       Discharge reason: Patient has not attended therapy since 9/7/2023 and Other: still has not been  authorized for visits.    Plan   This patient is discharged from Speech Therapy and is recommended to continue therapy for dysphagia through home health speech therapy services.    Thang Ordoñez M.S., CF-SLP  Speech-Language Pathologist Resident  9/28/2023

## 2023-09-29 ENCOUNTER — DOCUMENT SCAN (OUTPATIENT)
Dept: HOME HEALTH SERVICES | Facility: HOSPITAL | Age: 68
End: 2023-09-29
Payer: MEDICARE

## 2023-09-30 ENCOUNTER — HOSPITAL ENCOUNTER (EMERGENCY)
Facility: HOSPITAL | Age: 68
Discharge: HOME | End: 2023-10-01
Attending: EMERGENCY MEDICINE
Payer: MEDICARE

## 2023-09-30 DIAGNOSIS — N39.0 COMPLICATED URINARY TRACT INFECTION: Primary | ICD-10-CM

## 2023-09-30 DIAGNOSIS — R06.02 SHORTNESS OF BREATH: ICD-10-CM

## 2023-09-30 DIAGNOSIS — Z87.09 HISTORY OF COPD: ICD-10-CM

## 2023-09-30 DIAGNOSIS — R06.02 SOB (SHORTNESS OF BREATH): ICD-10-CM

## 2023-09-30 PROCEDURE — 93010 EKG 12-LEAD: ICD-10-PCS | Mod: ,,, | Performed by: INTERNAL MEDICINE

## 2023-09-30 PROCEDURE — 93010 ELECTROCARDIOGRAM REPORT: CPT | Mod: ,,, | Performed by: INTERNAL MEDICINE

## 2023-09-30 PROCEDURE — 93005 ELECTROCARDIOGRAM TRACING: CPT

## 2023-09-30 PROCEDURE — 83880 ASSAY OF NATRIURETIC PEPTIDE: CPT | Performed by: EMERGENCY MEDICINE

## 2023-09-30 PROCEDURE — 99285 EMERGENCY DEPT VISIT HI MDM: CPT

## 2023-09-30 PROCEDURE — 85025 COMPLETE CBC W/AUTO DIFF WBC: CPT | Performed by: EMERGENCY MEDICINE

## 2023-09-30 PROCEDURE — 80053 COMPREHEN METABOLIC PANEL: CPT | Performed by: EMERGENCY MEDICINE

## 2023-10-01 VITALS
DIASTOLIC BLOOD PRESSURE: 56 MMHG | TEMPERATURE: 98 F | BODY MASS INDEX: 38.96 KG/M2 | SYSTOLIC BLOOD PRESSURE: 119 MMHG | WEIGHT: 227 LBS | RESPIRATION RATE: 18 BRPM | HEART RATE: 72 BPM | OXYGEN SATURATION: 97 %

## 2023-10-01 LAB
ALBUMIN SERPL BCP-MCNC: 3.4 G/DL (ref 3.5–5.2)
ALP SERPL-CCNC: 162 U/L (ref 55–135)
ALT SERPL W/O P-5'-P-CCNC: 9 U/L (ref 10–44)
ANION GAP SERPL CALC-SCNC: 14 MMOL/L (ref 8–16)
AST SERPL-CCNC: 15 U/L (ref 10–40)
BACTERIA #/AREA URNS HPF: ABNORMAL /HPF
BASOPHILS # BLD AUTO: 0.01 K/UL (ref 0–0.2)
BASOPHILS NFR BLD: 0.1 % (ref 0–1.9)
BILIRUB SERPL-MCNC: 0.3 MG/DL (ref 0.1–1)
BILIRUB UR QL STRIP: NEGATIVE
BNP SERPL-MCNC: 23 PG/ML (ref 0–99)
BUN SERPL-MCNC: 10 MG/DL (ref 8–23)
CALCIUM SERPL-MCNC: 9.3 MG/DL (ref 8.7–10.5)
CHLORIDE SERPL-SCNC: 100 MMOL/L (ref 95–110)
CLARITY UR: ABNORMAL
CO2 SERPL-SCNC: 26 MMOL/L (ref 23–29)
COLOR UR: YELLOW
CREAT SERPL-MCNC: 0.9 MG/DL (ref 0.5–1.4)
DIFFERENTIAL METHOD: ABNORMAL
EOSINOPHIL # BLD AUTO: 0.3 K/UL (ref 0–0.5)
EOSINOPHIL NFR BLD: 4.7 % (ref 0–8)
ERYTHROCYTE [DISTWIDTH] IN BLOOD BY AUTOMATED COUNT: 15.1 % (ref 11.5–14.5)
EST. GFR  (NO RACE VARIABLE): >60 ML/MIN/1.73 M^2
GLUCOSE SERPL-MCNC: 96 MG/DL (ref 70–110)
GLUCOSE UR QL STRIP: NEGATIVE
HCT VFR BLD AUTO: 39.1 % (ref 37–48.5)
HGB BLD-MCNC: 12.1 G/DL (ref 12–16)
HGB UR QL STRIP: ABNORMAL
IMM GRANULOCYTES # BLD AUTO: 0.02 K/UL (ref 0–0.04)
IMM GRANULOCYTES NFR BLD AUTO: 0.3 % (ref 0–0.5)
KETONES UR QL STRIP: NEGATIVE
LEUKOCYTE ESTERASE UR QL STRIP: ABNORMAL
LYMPHOCYTES # BLD AUTO: 1.4 K/UL (ref 1–4.8)
LYMPHOCYTES NFR BLD: 20.5 % (ref 18–48)
MCH RBC QN AUTO: 26.7 PG (ref 27–31)
MCHC RBC AUTO-ENTMCNC: 30.9 G/DL (ref 32–36)
MCV RBC AUTO: 86 FL (ref 82–98)
MICROSCOPIC COMMENT: ABNORMAL
MONOCYTES # BLD AUTO: 0.6 K/UL (ref 0.3–1)
MONOCYTES NFR BLD: 9.1 % (ref 4–15)
NEUTROPHILS # BLD AUTO: 4.6 K/UL (ref 1.8–7.7)
NEUTROPHILS NFR BLD: 65.3 % (ref 38–73)
NITRITE UR QL STRIP: POSITIVE
NRBC BLD-RTO: 0 /100 WBC
PH UR STRIP: 6 [PH] (ref 5–8)
PLATELET # BLD AUTO: 217 K/UL (ref 150–450)
PLATELET BLD QL SMEAR: ABNORMAL
PMV BLD AUTO: 10.2 FL (ref 9.2–12.9)
POTASSIUM SERPL-SCNC: 4 MMOL/L (ref 3.5–5.1)
PROT SERPL-MCNC: 7.5 G/DL (ref 6–8.4)
PROT UR QL STRIP: ABNORMAL
RBC # BLD AUTO: 4.54 M/UL (ref 4–5.4)
RBC #/AREA URNS HPF: 18 /HPF (ref 0–4)
SODIUM SERPL-SCNC: 140 MMOL/L (ref 136–145)
SP GR UR STRIP: 1.02 (ref 1–1.03)
SQUAMOUS #/AREA URNS HPF: 0 /HPF
URN SPEC COLLECT METH UR: ABNORMAL
UROBILINOGEN UR STRIP-ACNC: NEGATIVE EU/DL
WBC # BLD AUTO: 7.01 K/UL (ref 3.9–12.7)
WBC #/AREA URNS HPF: 34 /HPF (ref 0–5)

## 2023-10-01 PROCEDURE — 63600175 PHARM REV CODE 636 W HCPCS: Performed by: EMERGENCY MEDICINE

## 2023-10-01 PROCEDURE — 25000003 PHARM REV CODE 250: Performed by: EMERGENCY MEDICINE

## 2023-10-01 PROCEDURE — 87186 SC STD MICRODIL/AGAR DIL: CPT | Performed by: EMERGENCY MEDICINE

## 2023-10-01 PROCEDURE — 87086 URINE CULTURE/COLONY COUNT: CPT | Performed by: EMERGENCY MEDICINE

## 2023-10-01 PROCEDURE — 96375 TX/PRO/DX INJ NEW DRUG ADDON: CPT

## 2023-10-01 PROCEDURE — 81000 URINALYSIS NONAUTO W/SCOPE: CPT | Performed by: EMERGENCY MEDICINE

## 2023-10-01 PROCEDURE — 87088 URINE BACTERIA CULTURE: CPT | Performed by: EMERGENCY MEDICINE

## 2023-10-01 PROCEDURE — 87077 CULTURE AEROBIC IDENTIFY: CPT | Performed by: EMERGENCY MEDICINE

## 2023-10-01 PROCEDURE — 96365 THER/PROPH/DIAG IV INF INIT: CPT

## 2023-10-01 RX ORDER — HYDROMORPHONE HYDROCHLORIDE 1 MG/ML
0.2 INJECTION, SOLUTION INTRAMUSCULAR; INTRAVENOUS; SUBCUTANEOUS
Status: COMPLETED | OUTPATIENT
Start: 2023-10-01 | End: 2023-10-01

## 2023-10-01 RX ORDER — AMOXICILLIN AND CLAVULANATE POTASSIUM 875; 125 MG/1; MG/1
1 TABLET, FILM COATED ORAL 2 TIMES DAILY
Qty: 14 TABLET | Refills: 0 | Status: SHIPPED | OUTPATIENT
Start: 2023-10-01 | End: 2024-01-05

## 2023-10-01 RX ADMIN — HYDROMORPHONE HYDROCHLORIDE 0.2 MG: 1 INJECTION, SOLUTION INTRAMUSCULAR; INTRAVENOUS; SUBCUTANEOUS at 01:10

## 2023-10-01 RX ADMIN — CEFTRIAXONE SODIUM 1 G: 1 INJECTION, POWDER, FOR SOLUTION INTRAMUSCULAR; INTRAVENOUS at 01:10

## 2023-10-01 NOTE — ED NOTES
Patient arrived to ED with complaints of red urine with clots, epigastric pain, increased SOB, swelling to BLE.  Patient reports seeing the blood in urine this evening. Suprapubic catheter in place. Red urine in bag noted.   Patient complaints of abdominal pain with palpation. Denies fever. Insertion site without s/s of infection.   Patient wears 5 L NC at home for SOB intermittently.   BLE swollen and painful. Patient reports having her lasix BID as prescribed.   A/o x 4. Hear of hearing. Denies CP and fever. Dyspnea upon exertion. Denies NVD, dizziness, vision changes.   Actively has a broken R great toe. Post-op shoes on.       APPEARANCE: Alert, oriented and in no acute distress.  CARDIAC: Normal rate and rhythm, no murmur heard.   PERIPHERAL VASCULAR: peripheral pulses present +1 pedal pulses. Normal cap refill. +4 edema BLE. Warm to touch.    RESPIRATORY: dyspnea upon exertion. Lungs sounds diminished and coarse throughout. Expansion symmetrical. No retractions noted. Reports wearing 5 L NC at home as needed.   GASTRO: epigastric pain. Suprapubic catheter in placed upon arrival with red urine in bag.   MUSC: ROM at baseline per patient. 4/4 BUE. Weak BLE but no change from baseline. Pain to feet. .  No obvious deformity.  SKIN: Skin is pale, warm and dry, normal skin turgor, mucous membranes moist. Bilateral feet wrapped with wound dressing. Patient reports wound to L foot. Cared by wound care. Dressings CDI.   NEURO: 5/5 strength major flexors/extensors bilaterally. Sensory intact to light touch bilaterally. Jessica coma scale: eyes open spontaneously-4, oriented & converses-5, obeys commands-6. No neurological abnormalities.   MENTAL STATUS: awake, alert and aware of environment.  EYE: PERRL, both eyes: pupils brisk and reactive to light. Normal size.  ENT: EARS: no obvious drainage. NOSE: no active bleeding.

## 2023-10-01 NOTE — ED NOTES
Apple juice and coffee given to patient and family member.   Patient resting in bed sleeping. Updated on plan of care. Antibiotics infusing.

## 2023-10-01 NOTE — ED PROVIDER NOTES
"Encounter Date: 9/30/2023       History     Chief Complaint   Patient presents with    Multiple Complaints     Patient reports fatigue for the last couple of days. Patient has suprapubic catheter that is due to be changed next week. Noticed blood clots in drainage bag tonight. Denies pain. Patient also reports increased SOB. History of COPD. States she is normally on 5 L O2 at home when walking around. Patient sating 91-93% currently on RA. Patient also complaining of bilateral foot pain.      Patient is a 68-year-old female who presents to the ED with multiple complaints.  Patient has a suprapubic catheter reports passing multiple blood clots today.  She states that she noticed the blood clots in the drainage bag tonight.  She denies any associated lower abdominal pain, fevers or chills.  She denies use of blood thinning medications.  Additionally, she reports pain to the bilateral feet.  She states that she sustained fractures to both of her feet last week and was seen by Podiatry.  She was prescribed Vicodin which she claims is not working for her pain.  She states that she is able to ambulate albeit somewhat limited secondary to the pain and her chronic bilateral lower extremity lymphedema.  Additionally, the patient has a history of COPD.  She reports that today after going to [place] she felt short of breath with exertion.  She states that she is on home oxygen and has not been using it.  She denies any associated chest pain palpitations, near-syncope or syncope does report "chest congestion" but denies any overt coughing, chills night sweats hemoptysis.        Review of patient's allergies indicates:   Allergen Reactions    (d)-limonene flavor      Other reaction(s): difficult intubation  Other reaction(s): Difficulty breathing    Bactrim [sulfamethoxazole-trimethoprim] Anaphylaxis    Benadryl [diphenhydramine hcl] Shortness Of Breath    Fentanyl Itching, Nausea And Vomiting and Swelling             Imitrex " [sumatriptan succinate] Shortness Of Breath    Percocet [oxycodone-acetaminophen] Shortness Of Breath    Topamax [topiramate] Shortness Of Breath    Vancomycin Anaphylaxis     Rash    Butorphanol tartrate     Darvocet a500 [propoxyphene n-acetaminophen]      Other reaction(s): Difficulty breathing    Evening primrose (oenothera biennis)     Lyrica [pregabalin] Other (See Comments)     tremors    White petrolatum-zinc     Zinc oxide-white petrolatum      Other reaction(s): Difficulty breathing    Latex, natural rubber Itching and Rash    Phenytoin Rash and Other (See Comments)     Trouble breathing     Past Medical History:   Diagnosis Date    Anxiety     Arthritis     Bilateral lower extremity edema     severe chronic    Carotid artery occlusion     Cataract     CHF (congestive heart failure)     Coronary artery disease     subtotalled LAD with collateral    Depression     Fever blister     Hard of hearing     Hypokalemia 01/09/2023    Hyponatremia 02/04/2022    Hypothyroid     Iron deficiency anemia     Lumbar radiculopathy     with chronic pain    Ocular migraine     Other emphysema 05/22/2023    Renal disorder     Sleep apnea     cpap     Past Surgical History:   Procedure Laterality Date    ADENOIDECTOMY      BACK SURGERY      x 12    CARDIAC CATHETERIZATION  2016    subtotalled LAD with right to left collaterals    CATARACT EXTRACTION W/  INTRAOCULAR LENS IMPLANT Left     Dr Coleman     CYSTOSCOPIC LITHOLAPAXY N/A 6/27/2019    Procedure: CYSTOLITHOLAPAXY;  Surgeon: Shireen Mayo MD;  Location: [facility] OR [address] [place] [address];  Service: Urology;  Laterality: N/A;    CYSTOSCOPIC LITHOLAPAXY N/A 9/3/2019    Procedure: CYSTOLITHOLAPAXY;  Surgeon: Shireen aMyo MD;  Location: [facility] OR 2ND FLR;  Service: Urology;  Laterality: N/A;    CYSTOSCOPY N/A 7/13/2021    Procedure: CYSTOSCOPY;  Surgeon: Shireen Mayo MD;  Location: [facility] OR 1ST FLR;  Service: Urology;  Laterality: N/A;    CYSTOSCOPY  11/16/2021    Procedure:  CYSTOSCOPY;  Surgeon: Shireen Mayo MD;  Location: [facility] OR [facility] FLR;  Service: Urology;;    CYSTOSCOPY  7/19/2022    Procedure: CYSTOSCOPY;  Surgeon: Shireen Mayo MD;  [facility];  Service: Urology;;    CYSTOSCOPY WITH INJECTION OF PERIURETHRAL BULKING AGENT  7/19/2022    Procedure: CYSTOSCOPY, WITH PERIURETHRAL BULKING AGENT INJECTION-MACROPLASTIQUE;  Surgeon: Shireen Mayo MD;  [facility];  Service: Urology;;    CYSTOSCOPY WITH INJECTION OF PERIURETHRAL BULKING AGENT N/A 3/28/2023    Procedure: CYSTOSCOPY, WITH PERIURETHRAL BULKING AGENT INJECTION;  Surgeon: Shireen Mayo MD;  [facility];  Service: Urology;  Laterality: N/A;  [place]    CYSTOSCOPY,WITH BOTULINUM TOXIN INJECTION N/A 12/13/2022    Procedure: CYSTOSCOPY,WITH BOTULINUM TOXIN INJECTION;  Surgeon: Shireen Mayo MD;  [facility];  Service: Urology;  Laterality: N/A;  300 U    CYSTOSCOPY,WITH BOTULINUM TOXIN INJECTION N/A 3/28/2023    Procedure: CYSTOSCOPY,WITH BOTULINUM TOXIN INJECTION;  Surgeon: Shireen Mayo MD;  [facility];  Service: Urology;  Laterality: N/A;  45 MIN.    300 UNITS    ESOPHAGOGASTRODUODENOSCOPY N/A 5/23/2018    Procedure: ESOPHAGOGASTRODUODENOSCOPY (EGD);  Surgeon: Prince Vance MD;  Location: [facility] (4TH FLR);  Service: Endoscopy;  Laterality: N/A;  r/s 'd per Dr. Vance due to family emergency- ER    ESOPHAGOGASTRODUODENOSCOPY N/A 6/23/2023    Procedure: EGD (ESOPHAGOGASTRODUODENOSCOPY);  Surgeon: Juaquin Barry MD;  Location: [facility] ENDO (2ND FLR);  Service: Endoscopy;  Laterality: N/A;  Refferal: PRINCE AVNCE  tele enc 5/18/23  dx: history of a Nissen fundoplication  Additional Scheduling Information:  On home oxygen 2nd floor for airway protection also with a pain pump elevated BMI close to 40   Prep Gastroparesis   ins    HYSTERECTOMY  1975    endometriosis    INJECTION OF BOTULINUM TOXIN TYPE A  7/13/2021     Procedure: INJECTION, BOTULINUM TOXIN, 200units;  Surgeon: Shireen Mayo MD;  [facility];  Service: Urology;;    INJECTION OF BOTULINUM TOXIN TYPE A  11/16/2021    Procedure: INJECTION, BOTULINUM TOXIN, 200units;  Surgeon: Shireen Mayo MD;  [facility];  Service: Urology;;    INJECTION OF BOTULINUM TOXIN TYPE A  7/19/2022    Procedure: INJECTION, BOTULINUM TOXIN, 300 units ;  Surgeon: Shireen Mayo MD;  Location: [facility] OR [address] [place] [address];  Service: Urology;;    INSERTION, SUPRAPUBIC CATHETER N/A 12/13/2022    Procedure: INSERTION, SUPRAPUBIC CATHETER;  Surgeon: Shireen Mayo MD;  Location: [facility] OR [address] [place] [address];  Service: Urology;  Laterality: N/A;  exchange    pain pump placement      SQ Dilaudid Pump managed by Dr. Hillman, Pain Management    REMOVAL OF BONE SPUR OF FOOT Bilateral 9/16/2022    Procedure: EXCISION ARTHRITIC BONE, BILATERAL FOOT;  Surgeon: Adam Mcguire DPM;  Location: [facility];  Service: Podiatry;  Laterality: Bilateral;    REPLACEMENT OF BACLOFEN PUMP N/A 8/2/2023    Procedure: REPLACEMENT, BACLOFEN PUMP;  Surgeon: Smitha Condon MD;  Location: [facility] OR [address] [place] [address];  Service: Neurosurgery;  Laterality: N/A;  Anes: Gen  Blood: Type & Screen  Pos: Left Lat  Intrathecal Pump  Bed: Reg Reversed  Rad: C-arm  Pump: 40 cc, [employer]tron[employer]    REPLACEMENT OF CATHETER N/A 10/31/2019    Procedure: REPLACEMENT, CATHETER-SUPRAPUBIC;  Surgeon: Shireen Mayo MD;  Location: [facility] OR [address] [place] [address];  Service: Urology;  Laterality: N/A;    SPINAL CORD STIMULATOR REMOVAL      before [place]    SPINE SURGERY  5-13-13    CERVICAL FUSION    TONSILLECTOMY       Family History   Problem Relation Age of Onset    Cancer Mother 55        breast    Cancer Father         esophagus,had laryngectomy    Esophageal cancer Father     Parkinsonism Maternal Grandmother     Tremor Maternal Grandmother     No Known Problems Brother     No Known Problems Brother     Heart disease Maternal Uncle     Colon  "cancer Maternal Uncle         Less than 60    No Known Problems Sister     No Known Problems Maternal Aunt     Cirrhosis Paternal Aunt         ETOH    Liver disease Paternal Aunt         ETOH    Liver disease Paternal Uncle         ETOH    Cirrhosis Paternal Uncle         ETOH    No Known Problems Maternal Grandfather     No Known Problems Paternal Grandmother     No Known Problems Paternal Grandfather     Melanoma Neg Hx     Amblyopia Neg Hx     Blindness Neg Hx     Cataracts Neg Hx     Diabetes Neg Hx     Glaucoma Neg Hx     Hypertension Neg Hx     Macular degeneration Neg Hx     Retinal detachment Neg Hx     Strabismus Neg Hx     Stroke Neg Hx     Thyroid disease Neg Hx     Celiac disease Neg Hx     Colon polyps Neg Hx     Cystic fibrosis Neg Hx     Crohn's disease Neg Hx     Inflammatory bowel disease Neg Hx     Liver cancer Neg Hx     Rectal cancer Neg Hx     Stomach cancer Neg Hx     Ulcerative colitis Neg Hx     Lymphoma Neg Hx      Social History     Tobacco Use    Smoking status: Never    Smokeless tobacco: Never   Substance Use Topics    Alcohol use: Never    Drug use: Yes     Types: Marijuana     Comment: "medical marijuana gummies"     Review of Systems   Constitutional:  Positive for fatigue. Negative for chills and fever.   Respiratory:  Positive for shortness of breath. Negative for cough.    Cardiovascular:  Positive for leg swelling (Chronic). Negative for chest pain.   Gastrointestinal:  Negative for abdominal pain, nausea and vomiting.   Genitourinary:  Negative for dysuria and flank pain.   Musculoskeletal:  Negative for back pain, joint swelling, myalgias and neck pain.   Neurological:  Negative for dizziness and headaches.   Psychiatric/Behavioral:  Negative for agitation and confusion.        Physical Exam     Initial Vitals [09/30/23 2222]   BP Pulse Resp Temp SpO2   125/61 80 18 97.6 °F (36.4 °C) (!) 93 %      MAP       --         Physical Exam    Constitutional: She appears well-developed " and well-nourished.   HENT:   Head: Normocephalic and atraumatic.   Eyes: EOM are normal. Pupils are equal, round, and reactive to light.   Neck: Neck supple.   Normal range of motion.  Pulmonary/Chest: Breath sounds normal.   Abdominal: Abdomen is soft. Bowel sounds are normal.   Suprapubic catheter in place draining red-tinged urine; no signs of overt infection at insertion site   Musculoskeletal:         General: Tenderness and edema present.      Cervical back: Normal range of motion and neck supple.      Comments: Significant lower extremity non pitting edema which appears to be chronic; no findings to suggest cutaneous infection      Neurological: She is alert and oriented to person, place, and time. She has normal strength.   Skin: Skin is warm. Capillary refill takes less than 2 seconds.   Psychiatric: She has a normal mood and affect.         ED Course   Procedures  Labs Reviewed   CBC W/ AUTO DIFFERENTIAL - Abnormal; Notable for the following components:       Result Value    MCH 26.7 (*)     MCHC 30.9 (*)     RDW 15.1 (*)     All other components within normal limits   COMPREHENSIVE METABOLIC PANEL - Abnormal; Notable for the following components:    Albumin 3.4 (*)     Alkaline Phosphatase 162 (*)     ALT 9 (*)     All other components within normal limits   URINALYSIS, REFLEX TO URINE CULTURE - Abnormal; Notable for the following components:    Appearance, UA Hazy (*)     Protein, UA Trace (*)     Occult Blood UA 3+ (*)     Nitrite, UA Positive (*)     Leukocytes, UA 3+ (*)     All other components within normal limits    Narrative:     Specimen Source->Urine   URINALYSIS MICROSCOPIC - Abnormal; Notable for the following components:    RBC, UA 18 (*)     WBC, UA 34 (*)     All other components within normal limits    Narrative:     Specimen Source->Urine   CULTURE, URINE   B-TYPE NATRIURETIC PEPTIDE          Imaging Results              X-Ray Chest AP Portable (Final result)  Result time 10/01/23  00:53:03      Final result by Alyce Palumbo MD (10/01/23 00:53:03)                   Impression:      Please see above.      Electronically signed by: Alyce Palumbo MD  Date:    10/01/2023  Time:    00:53               Narrative:    EXAMINATION:  XR CHEST AP PORTABLE    CLINICAL HISTORY:  Shortness of breath    TECHNIQUE:  Single frontal view of the chest was performed.    COMPARISON:  09/11/2023    FINDINGS:  Cardiac monitoring leads overlie the chest.  There is unchanged enlargement of the cardiomediastinal silhouette.  There are low lung volumes with increased interstitial prominence which could relate to crowding of pulmonary bronchovascular markings due to hypoventilatory status with superimposed component of interstitial edema or infectious process/pneumonitis difficult to exclude.  No confluent airspace consolidation, substantial volume of pleural fluid or pneumothorax identified.  Bibasilar atelectasis present.  Osseous structures are intact with partially visualized postoperative change of the cervical spine.                                       Medications   cefTRIAXone (ROCEPHIN) 1 g in dextrose 5 % in water (D5W) 100 mL IVPB (MB+) (1 g Intravenous New Bag 10/1/23 0152)   HYDROmorphone injection 0.2 mg (0.2 mg Intravenous Given 10/1/23 0152)     Medical Decision Making  Amount and/or Complexity of Data Reviewed  Labs: ordered. Decision-making details documented in ED Course.  Radiology: ordered. Decision-making details documented in ED Course.    Risk  Prescription drug management.               ED Course as of 10/01/23 0218   Sun Oct 01, 2023   0104 X-Ray Chest AP Portable  Stable chest. No acute abnormality.  [LC]   0104 Sodium: 140  Reviewed by self - WNL  [LC]   0105 Potassium: 4.0  Reviewed by self - WNL  [LC]   0105 BNP: 23  Reviewed by self - WNL  [LC]   0105 AST: 15  Reviewed by self - WNL  [LC]      ED Course User Index  [LC] Tushar Muniz MD               Medical Decision Making:    Initial Assessment:   See HPI   Differential Diagnosis:   Complicated UTI, CAP, COPD exacerbation, electrolyte abnormality, CARITO, sepsis   Clinical Tests:   Lab Tests: Reviewed and Ordered  Radiological Study: Ordered and Reviewed  ED Management:  - ED workup notable for urinary tract infection; patient will be administered IV Rocephin and discharged with prescription for Augmentin; urine culture pending; patient also admitted very small dose of hydromorphone for chronic pain.  No indication for emergent intervention or admission at this time.  Will discharge patient home  - leg bag changed  - patient hemodynamically stable and in no acute distress  - patient is scheduled to have suprapubic catheter replaced later this week  - No further intervention is indicated at this time after having taken into account the patient's history, physical exam findings, and empirical and objective data obtained during the patient's emergency department workup.   - The patient is at low risk for an emergent medical condition at this time, and I am of the belief that that it is safe to discharge the patient from the emergency department.   - The patient is instructed to follow up as outpatient as indicated on the discharge paperwork.    - I have discussed the specifics of the workup with the patient and the patient has verbalized understanding of the details of the workup, the diagnosis, the treatment plan, and the need for outpatient follow-up.    - Although the patient has no emergent etiology today this does not preclude the development of an emergent condition so, in addition, I have advised the patient that they can return to the ED and/or activate EMS at any time with worsening of their symptoms, change of their symptoms, or with any other medical complaint.    - The patient remained comfortable and stable during their visit in the ED.    - Discharge and follow-up instructions discussed with the patient who expressed  understanding and willingness to comply with my recommendations.  - Results of all emergency department tests  discussed thoroughly with patient; all patient questions answered; pt in agreement with plan  - Pt instructed to follow up with PCP in 2-3 days for recheck of today's complaints  - Pt given strict emergency department return precautions for any new or worsening of symptoms  - Pt discharged from the emergency department in stable condition, in no acute distress         Clinical Impression:   Final diagnoses:  [R06.02] SOB (shortness of breath)  [R06.02] Shortness of breath  [N39.0] Complicated urinary tract infection (Primary)  [Z87.09] History of COPD        ED Disposition Condition    Discharge Stable          ED Prescriptions       Medication Sig Dispense Start Date End Date Auth. Provider    amoxicillin-clavulanate 875-125mg (AUGMENTIN) 875-125 mg per tablet Take 1 tablet by mouth 2 (two) times daily. 14 tablet 10/1/2023 -- Tushar Muniz MD          Follow-up Information       Follow up With Specialties Details Why Contact Info    Mesfin Hodges II, MD Internal Medicine Schedule an appointment as soon as possible for a visit   [address]  [place] [address]  [phone]               Tushar Muniz MD  10/01/23 0218

## 2023-10-02 ENCOUNTER — TELEPHONE (OUTPATIENT)
Dept: UROLOGY | Facility: CLINIC | Age: 68
End: 2023-10-02
Payer: MEDICARE

## 2023-10-02 ENCOUNTER — OFFICE VISIT (OUTPATIENT)
Dept: PODIATRY | Facility: CLINIC | Age: 68
End: 2023-10-02
Payer: MEDICARE

## 2023-10-02 ENCOUNTER — TELEPHONE (OUTPATIENT)
Dept: INTERNAL MEDICINE | Facility: CLINIC | Age: 68
End: 2023-10-02
Payer: MEDICARE

## 2023-10-02 ENCOUNTER — OUTPATIENT CASE MANAGEMENT (OUTPATIENT)
Dept: ADMINISTRATIVE | Facility: OTHER | Age: 68
End: 2023-10-02
Payer: MEDICARE

## 2023-10-02 VITALS
SYSTOLIC BLOOD PRESSURE: 106 MMHG | RESPIRATION RATE: 14 BRPM | BODY MASS INDEX: 38.76 KG/M2 | DIASTOLIC BLOOD PRESSURE: 57 MMHG | HEART RATE: 70 BPM | WEIGHT: 227 LBS | OXYGEN SATURATION: 89 % | HEIGHT: 64 IN

## 2023-10-02 DIAGNOSIS — M79.662 PAIN IN BOTH LOWER LEGS: ICD-10-CM

## 2023-10-02 DIAGNOSIS — M79.671 PAIN IN RIGHT FOOT: ICD-10-CM

## 2023-10-02 DIAGNOSIS — I89.0 LYMPHEDEMA: ICD-10-CM

## 2023-10-02 DIAGNOSIS — R23.8 BLISTERS OF MULTIPLE SITES: Primary | ICD-10-CM

## 2023-10-02 DIAGNOSIS — M79.661 PAIN IN BOTH LOWER LEGS: ICD-10-CM

## 2023-10-02 PROCEDURE — 3074F SYST BP LT 130 MM HG: CPT | Mod: CPTII,S$GLB,, | Performed by: STUDENT IN AN ORGANIZED HEALTH CARE EDUCATION/TRAINING PROGRAM

## 2023-10-02 PROCEDURE — 1125F PR PAIN SEVERITY QUANTIFIED, PAIN PRESENT: ICD-10-PCS | Mod: CPTII,S$GLB,, | Performed by: STUDENT IN AN ORGANIZED HEALTH CARE EDUCATION/TRAINING PROGRAM

## 2023-10-02 PROCEDURE — 1125F AMNT PAIN NOTED PAIN PRSNT: CPT | Mod: CPTII,S$GLB,, | Performed by: STUDENT IN AN ORGANIZED HEALTH CARE EDUCATION/TRAINING PROGRAM

## 2023-10-02 PROCEDURE — 29580 PR STRAPPING UNNA BOOT: ICD-10-PCS | Mod: 50,S$GLB,, | Performed by: STUDENT IN AN ORGANIZED HEALTH CARE EDUCATION/TRAINING PROGRAM

## 2023-10-02 PROCEDURE — 3008F PR BODY MASS INDEX (BMI) DOCUMENTED: ICD-10-PCS | Mod: CPTII,S$GLB,, | Performed by: STUDENT IN AN ORGANIZED HEALTH CARE EDUCATION/TRAINING PROGRAM

## 2023-10-02 PROCEDURE — 3078F DIAST BP <80 MM HG: CPT | Mod: CPTII,S$GLB,, | Performed by: STUDENT IN AN ORGANIZED HEALTH CARE EDUCATION/TRAINING PROGRAM

## 2023-10-02 PROCEDURE — 3044F PR MOST RECENT HEMOGLOBIN A1C LEVEL <7.0%: ICD-10-PCS | Mod: CPTII,S$GLB,, | Performed by: STUDENT IN AN ORGANIZED HEALTH CARE EDUCATION/TRAINING PROGRAM

## 2023-10-02 PROCEDURE — 99214 PR OFFICE/OUTPT VISIT, EST, LEVL IV, 30-39 MIN: ICD-10-PCS | Mod: 25,S$GLB,, | Performed by: STUDENT IN AN ORGANIZED HEALTH CARE EDUCATION/TRAINING PROGRAM

## 2023-10-02 PROCEDURE — 99214 OFFICE O/P EST MOD 30 MIN: CPT | Mod: 25,S$GLB,, | Performed by: STUDENT IN AN ORGANIZED HEALTH CARE EDUCATION/TRAINING PROGRAM

## 2023-10-02 PROCEDURE — 99999 PR PBB SHADOW E&M-EST. PATIENT-LVL IV: ICD-10-PCS | Mod: PBBFAC,,, | Performed by: STUDENT IN AN ORGANIZED HEALTH CARE EDUCATION/TRAINING PROGRAM

## 2023-10-02 PROCEDURE — 99999 PR PBB SHADOW E&M-EST. PATIENT-LVL IV: CPT | Mod: PBBFAC,,, | Performed by: STUDENT IN AN ORGANIZED HEALTH CARE EDUCATION/TRAINING PROGRAM

## 2023-10-02 PROCEDURE — 3074F PR MOST RECENT SYSTOLIC BLOOD PRESSURE < 130 MM HG: ICD-10-PCS | Mod: CPTII,S$GLB,, | Performed by: STUDENT IN AN ORGANIZED HEALTH CARE EDUCATION/TRAINING PROGRAM

## 2023-10-02 PROCEDURE — 3044F HG A1C LEVEL LT 7.0%: CPT | Mod: CPTII,S$GLB,, | Performed by: STUDENT IN AN ORGANIZED HEALTH CARE EDUCATION/TRAINING PROGRAM

## 2023-10-02 PROCEDURE — 3008F BODY MASS INDEX DOCD: CPT | Mod: CPTII,S$GLB,, | Performed by: STUDENT IN AN ORGANIZED HEALTH CARE EDUCATION/TRAINING PROGRAM

## 2023-10-02 PROCEDURE — 3078F PR MOST RECENT DIASTOLIC BLOOD PRESSURE < 80 MM HG: ICD-10-PCS | Mod: CPTII,S$GLB,, | Performed by: STUDENT IN AN ORGANIZED HEALTH CARE EDUCATION/TRAINING PROGRAM

## 2023-10-02 PROCEDURE — 29580 STRAPPING UNNA BOOT: CPT | Mod: 50,S$GLB,, | Performed by: STUDENT IN AN ORGANIZED HEALTH CARE EDUCATION/TRAINING PROGRAM

## 2023-10-02 RX ORDER — TRAMADOL HYDROCHLORIDE 50 MG/1
50 TABLET ORAL EVERY 4 HOURS PRN
Qty: 30 TABLET | Refills: 0 | Status: SHIPPED | OUTPATIENT
Start: 2023-10-02 | End: 2024-01-17 | Stop reason: SDUPTHER

## 2023-10-02 NOTE — PROGRESS NOTES
Subjective:     Patient    Tasha Hawley is a 68 y.o. female.    Problem    09/21/23: Previously followed outpatient by Dr Mcguire. 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 Has ongoing right 1st toe pain, poorly controlled on opioids. Uses surgical shoes. Also has home health wrapping her legs every other day for lymphedema, does not have a physician following her for lymphedema.     10/02/23: Stopped amitriptyline due to side effects. Requesting refill on tramadol. Has followup with pain management in 2 weeks.     History    History obtained from patient and review of medical records.     Past Medical History:   Diagnosis Date    Anxiety     Arthritis     Bilateral lower extremity edema     severe chronic    Carotid artery occlusion     Cataract     CHF (congestive heart failure)     Coronary artery disease     subtotalled LAD with collateral    Depression     Fever blister     Hard of hearing     Hypokalemia 01/09/2023    Hyponatremia 02/04/2022    Hypothyroid     Iron deficiency anemia     Lumbar radiculopathy     with chronic pain    Ocular migraine     Other emphysema 05/22/2023    Renal disorder     Sleep apnea     cpap       Past Surgical History:   Procedure Laterality Date    ADENOIDECTOMY      BACK SURGERY      x 12    CARDIAC CATHETERIZATION  2016    subtotalled LAD with right to left collaterals    CATARACT EXTRACTION W/  INTRAOCULAR LENS IMPLANT Left     Dr Coleman     CYSTOSCOPIC LITHOLAPAXY N/A 6/27/2019    Procedure: CYSTOLITHOLAPAXY;  Surgeon: Shireen Mayo MD;  Location: [address];  Service: Urology;  Laterality: N/A;    CYSTOSCOPIC LITHOLAPAXY N/A 9/3/2019    Procedure: CYSTOLITHOLAPAXY;  Surgeon: Shireen Mayo MD;  Location: [address];  Service: Urology;  Laterality: N/A;    CYSTOSCOPY N/A 7/13/2021    Procedure: CYSTOSCOPY;  Surgeon: Shireen Mayo MD;  Location:  [facility] OR 1ST FLR;  Service: Urology;  Laterality: N/A;    CYSTOSCOPY  11/16/2021    Procedure: CYSTOSCOPY;  Surgeon: Shireen Mayo MD;  Location: [email or web address] F[facility];  Service: Urology;;    CYSTOSCOPY  7/19/2022    Procedure: CYSTOSCOPY;  Surgeon: Shireen Mayo MD;  Location: [facility] OR [email or web address] F[facility];  Service: Urology;;    CYSTOSCOPY WITH INJECTION OF PERIURETHRAL BULKING AGENT  7/19/2022    Procedure: CYSTOSCOPY, WITH PERIURETHRAL BULKING AGENT INJECTION-MACROPLASTIQUE;  Surgeon: Shireen Mayo MD;  Location: [facility] OR [email or web address] FLR;  Service: Urology;;    CYSTOSCOPY WITH INJECTION OF PERIURETHRAL BULKING AGENT N/A 3/28/2023    Procedure: CYSTOSCOPY, WITH PERIURETHRAL BULKING AGENT INJECTION;  Surgeon: Shireen Mayo MD;  [facility];  Service: Urology;  Laterality: N/A;  [place]    CYSTOSCOPY,WITH BOTULINUM TOXIN INJECTION N/A 12/13/2022    Procedure: CYSTOSCOPY,WITH BOTULINUM TOXIN INJECTION;  Surgeon: Shireen Mayo MD;  [facility];  Service: Urology;  Laterality: N/A;  300 U    CYSTOSCOPY,WITH BOTULINUM TOXIN INJECTION N/A 3/28/2023    Procedure: CYSTOSCOPY,WITH BOTULINUM TOXIN INJECTION;  Surgeon: Shireen Mayo MD;  [facility];  Service: Urology;  Laterality: N/A;  45 MIN.    300 UNITS    ESOPHAGOGASTRODUODENOSCOPY N/A 5/23/2018    Procedure: ESOPHAGOGASTRODUODENOSCOPY (EGD);  Surgeon: Prince Vance MD;  Location: [facility] ENDO (4TH FLR);  Service: Endoscopy;  Laterality: N/A;  r/s 'd per Dr. Vance due to family emergency- ER    ESOPHAGOGASTRODUODENOSCOPY N/A 6/23/2023    Procedure: EGD (ESOPHAGOGASTRODUODENOSCOPY);  Surgeon: Juaquin Barry MD;  Location: [facility] ENDO (2ND FLR);  Service: Endoscopy;  Laterality: N/A;  Refferal: PRINCE VANCE  tele enc 5/18/23  dx: history of a Nissen fundoplication  Additional Scheduling Information:  On home oxygen 2nd floor for airway protection also with a pain pump elevated BMI close to 40   Prep Gastroparesis    ins    HYSTERECTOMY  1975    endometriosis    INJECTION OF BOTULINUM TOXIN TYPE A  7/13/2021    Procedure: INJECTION, BOTULINUM TOXIN, 200units;  Surgeon: Shireen Mayo MD;  [facility];  Service: Urology;;    INJECTION OF BOTULINUM TOXIN TYPE A  11/16/2021    Procedure: INJECTION, BOTULINUM TOXIN, 200units;  Surgeon: Shireen Mayo MD;  [facility];  Service: Urology;;    INJECTION OF BOTULINUM TOXIN TYPE A  7/19/2022    Procedure: INJECTION, BOTULINUM TOXIN, 300 units ;  Surgeon: Shireen Mayo MD;  [facility];  Service: Urology;;    INSERTION, SUPRAPUBIC CATHETER N/A 12/13/2022    Procedure: INSERTION, SUPRAPUBIC CATHETER;  Surgeon: Shireen Mayo MD;  [facility];  Service: Urology;  Laterality: N/A;  exchange    pain pump placement      SQ Dilaudid Pump managed by Dr. Hillman, Pain Management    REMOVAL OF BONE SPUR OF FOOT Bilateral 9/16/2022    Procedure: EXCISION ARTHRITIC BONE, BILATERAL FOOT;  Surgeon: NICOLA Mcgrath;  Location: [facility] OR;  Service: Podiatry;  Laterality: Bilateral;    REPLACEMENT OF BACLOFEN PUMP N/A 8/2/2023    Procedure: REPLACEMENT, BACLOFEN PUMP;  Surgeon: Smitha Condon MD;  Location: [facility] OR 2ND FLR;  Service: Neurosurgery;  Laterality: N/A;  Anes: Gen  Blood: Type & Screen  Pos: Left Lat  Intrathecal Pump  Bed: Reg Reversed  Rad: C-arm  Pump: 40 cc, [facility]    REPLACEMENT OF CATHETER N/A 10/31/2019    Procedure: REPLACEMENT, CATHETER-SUPRAPUBIC;  Surgeon: Shireen Mayo MD;  Location: [facility] OR [address] [place] [address];  Service: Urology;  Laterality: N/A;    SPINAL CORD STIMULATOR REMOVAL      before [place]    SPINE SURGERY  5-13-13    CERVICAL FUSION    TONSILLECTOMY          Objective:     Vitals  Wt Readings from Last 1 Encounters:   10/02/23 103 kg (227 lb)     Temp Readings from Last 1 Encounters:   09/30/23 97.6 °F (36.4 °C) (Oral)     BP Readings from Last 1 Encounters:   10/02/23 (!) 106/57     Pulse  Readings from Last 1 Encounters:   10/02/23 70       Dermatological Exam    Skin:  Pedal hair growth diminished and Pedal skin thin and shiny on right; irritation and blistering of skin  Pedal hair growth diminished and Pedal skin thin and shiny on left; irritation and blistering of skin                      Nails:  10 nail(s) thickened and 10 nail(s) discolored    Vascular Exam    Arteries:  Posterior tibial artery nonpalpable on right  Dorsalis pedis artery palpable on right  Posterior tibial artery nonpalpable on left  Dorsalis pedis artery palpable on left    Veins:  Telangectasia on right  Telangectasia on left    Swellin+ nonpitting on right  2+ nonpitting on left    Neurological Exam    [place] touch test:  6/6 sites sensed, light touch intact     Musculoskeletal Exam    Footwear:  Surgical shoe on right  Surgical shoe on left    Gait Exam:   Ambulatory Status: Ambulatory  Gait: Normal  Assistive Devices: None    Foot Progression Angle:  Normal on right  Normal on left     Right Lower Extremity Additional Findings:  Right foot and ankle function, strength, and range of motion unremarkable except as noted above.     Left Lower Extremity Additional Findings:  Left foot and ankle function, strength, and range of motion unremarkable except as noted above.    Imaging and Other Tests    Imaging:  Independently reviewed and interpreted imaging, findings are as follows: N/A     Assessment:     Encounter Diagnoses   Name Primary?    Blisters of multiple sites Yes    Lymphedema     Pain in both lower legs     Pain in right foot           Plan:     I counseled the patient on her conditions, their implications and medical management.    Right 1st toe fracture, pain: acute  -Ordered amitriptyline.   -Will recheck every 3-4 weeks. Anticipate about 1-2 months more of healing.   -Protected WB in surgical shoe.     Lymphedema, blistering, cellulitis, pain: chronic stable  -Continue Augmentin as prescribed.   -Tramadol  refilled. Followup with pain management for chronic pain management.   -Bilateral unna boot multilayer compression dressings applied to lower legs.   -HH for unna boots reordered.      Return to clinic in 1-2 weeks for dressing change, call sooner PRN.

## 2023-10-02 NOTE — TELEPHONE ENCOUNTER
Called pt to confirm arrival time of 900am for surgery on 10-.  Left detailed message including location and surgery instructions.

## 2023-10-02 NOTE — TELEPHONE ENCOUNTER
----- Message from Karla Barboza sent at 10/2/2023  8:04 AM CDT -----  Contact: [phone]  .1 Patient would like to get medical advice.  Symptoms (please be specific): blister all over with liquid  How long has patient had these symptoms: a while  [facility] and [facility] [address] Phone:  [phone]  Fax:  [phone]  Any drug allergies: on file  Comments: Patient would like to get medical advice. Patient stated that PCP is suppose to referred her to emphysema doctor.please call and advise. Thank you

## 2023-10-02 NOTE — PROGRESS NOTES
Outpatient Care Management  Plan of Care Follow Up Visit    Patient: Tasha Hawley  MRN: [id number]  Date of Service: 10/02/2023  Completed by: Michelle Box RN  Referral Date: 04/17/2023    Reason for Visit   Patient presents with    OPCM RN Follow Up Call       Brief Summary: Patient states she is still having pain to her toe.  She does get up and walk around but props her legs up when she is sitting.  Her bilateral legs are still wrapped.  They are swollen, red and have blisters about the size of quarters on them.  Patient has an [facility]  nurse that comes to change her dressings.  Patient's current weight is 229.8 at home.  She was 227 at the emergency department on Friday, 9/29, when she went for a UTI that she is currently taking Augmentin for.  She did receive her Flu vaccine and Pneumococcal vaccine on 9/22/23.  Patient states she did receive the educational materials and will read through them.  Patient states she is not eating processed meats very much because she is not eating a lot of meat.  She states she prefers vegetables and potatoes.  She states she is following a low sodium diet but then says that she uses regular Luciano's seasoning or salt, pepper and garlic for seasoning.  She states she gets short of breath sometimes and puts her oxygen on when that happens.  She states she is always fatigued because she only gets a couple of hours sleep a day.  She deals with pain everyday.  She states she doesn't remember the S&S of CHF.    CM reiterated to patient the signs and symptoms of VTE including pain or tenderness in an extremity not caused by injury; swelling of leg or arm and skin of extremity that is warm to touch, red or discolored (pale, blue).  CM also reviewed that if she develops a VTE that she could have a PE.  CM reviewed the S&S of PE including difficulty breathing, sudden respiratory distress, chest pain that worsens with a deep breath, coughing or coughing up blood, faster than  normal or irregular heartbeat, restlessness, fear or sense of impending doom.  CM reviewed again with patient that she should get up once an hour to walk around to help prevent VTE.  CM reviewed with patient a low sodium diet, 0412-9821 mg of sodium per day.  CM explained to the patient that she should not be cooking with salt or regular Luciano's.  CM explained she should use things like Mrs Dash, Lemon Pepper seasoning or No Salt Luciano's seasoning.  She states she has Mrs Dash and Lemon Pepper but didn't know there was a No Salt Luciano's.  CM reiterated the S&S of CHF to patient including difficulty breathing or shortness of breath, especially with activity; persistent cough; swelling of feet, legs or abdomen; unexplained weight gain; fatigue; weakness; loss of appetite; dizziness or rapid heartbeat.    Next steps:   Follow up phone call in three weeks  Follow up if weighing herself daily and logging  Follow up on 3 lb/5 lb rule  Follow up if cooking with salt  Follow up if using alternative seasoning, got No Salt Luciano's  Follow up on low sodium diet  Follow up on S&S of CHF  Follow up on S&S of VTE  Follow up on S&S of PE

## 2023-10-03 ENCOUNTER — TELEPHONE (OUTPATIENT)
Dept: ENDOSCOPY | Facility: HOSPITAL | Age: 68
End: 2023-10-03
Payer: MEDICARE

## 2023-10-03 NOTE — TELEPHONE ENCOUNTER
Left voicemail for pt to call Endoscopy Scheduling to confirm Esophageal Manometry on 10/10/23.

## 2023-10-04 ENCOUNTER — OFFICE VISIT (OUTPATIENT)
Dept: PULMONOLOGY | Facility: CLINIC | Age: 68
End: 2023-10-04
Payer: MEDICARE

## 2023-10-04 ENCOUNTER — HOSPITAL ENCOUNTER (EMERGENCY)
Facility: HOSPITAL | Age: 68
Discharge: HOME | End: 2023-10-05
Attending: EMERGENCY MEDICINE
Payer: MEDICARE

## 2023-10-04 VITALS
BODY MASS INDEX: 40.24 KG/M2 | WEIGHT: 235.69 LBS | SYSTOLIC BLOOD PRESSURE: 110 MMHG | DIASTOLIC BLOOD PRESSURE: 60 MMHG | HEART RATE: 70 BPM | HEIGHT: 64 IN | OXYGEN SATURATION: 96 %

## 2023-10-04 DIAGNOSIS — N30.00 ACUTE CYSTITIS WITHOUT HEMATURIA: Primary | Chronic | ICD-10-CM

## 2023-10-04 DIAGNOSIS — J96.22 ACUTE ON CHRONIC RESPIRATORY FAILURE WITH HYPOXIA AND HYPERCAPNIA: ICD-10-CM

## 2023-10-04 DIAGNOSIS — I89.0 LYMPHEDEMA: Primary | ICD-10-CM

## 2023-10-04 DIAGNOSIS — I51.9 PULMONARY HYPERTENSION DUE TO DIASTOLIC SYSTEMIC VENTRICULAR DYSFUNCTION: ICD-10-CM

## 2023-10-04 DIAGNOSIS — I50.33 ACUTE ON CHRONIC DIASTOLIC (CONGESTIVE) HEART FAILURE: Primary | ICD-10-CM

## 2023-10-04 DIAGNOSIS — J96.21 ACUTE ON CHRONIC RESPIRATORY FAILURE WITH HYPOXIA AND HYPERCAPNIA: ICD-10-CM

## 2023-10-04 DIAGNOSIS — R06.02 SHORTNESS OF BREATH: ICD-10-CM

## 2023-10-04 DIAGNOSIS — I27.29 PULMONARY HYPERTENSION DUE TO DIASTOLIC SYSTEMIC VENTRICULAR DYSFUNCTION: ICD-10-CM

## 2023-10-04 LAB
ALBUMIN SERPL BCP-MCNC: 2.9 G/DL (ref 3.5–5.2)
ALP SERPL-CCNC: 153 U/L (ref 55–135)
ALT SERPL W/O P-5'-P-CCNC: 6 U/L (ref 10–44)
ANION GAP SERPL CALC-SCNC: 8 MMOL/L (ref 8–16)
AST SERPL-CCNC: 11 U/L (ref 10–40)
BACTERIA UR CULT: ABNORMAL
BASOPHILS # BLD AUTO: 0.01 K/UL (ref 0–0.2)
BASOPHILS NFR BLD: 0.2 % (ref 0–1.9)
BILIRUB SERPL-MCNC: 0.3 MG/DL (ref 0.1–1)
BNP SERPL-MCNC: 33 PG/ML (ref 0–99)
BUN SERPL-MCNC: 4 MG/DL (ref 8–23)
CALCIUM SERPL-MCNC: 8.9 MG/DL (ref 8.7–10.5)
CHLORIDE SERPL-SCNC: 104 MMOL/L (ref 95–110)
CO2 SERPL-SCNC: 29 MMOL/L (ref 23–29)
CREAT SERPL-MCNC: 0.8 MG/DL (ref 0.5–1.4)
DIFFERENTIAL METHOD: ABNORMAL
EOSINOPHIL # BLD AUTO: 0.2 K/UL (ref 0–0.5)
EOSINOPHIL NFR BLD: 4.4 % (ref 0–8)
ERYTHROCYTE [DISTWIDTH] IN BLOOD BY AUTOMATED COUNT: 14.5 % (ref 11.5–14.5)
EST. GFR  (NO RACE VARIABLE): >60 ML/MIN/1.73 M^2
GLUCOSE SERPL-MCNC: 90 MG/DL (ref 70–110)
HCT VFR BLD AUTO: 32.6 % (ref 37–48.5)
HGB BLD-MCNC: 10.1 G/DL (ref 12–16)
IMM GRANULOCYTES # BLD AUTO: 0.03 K/UL (ref 0–0.04)
IMM GRANULOCYTES NFR BLD AUTO: 0.6 % (ref 0–0.5)
LACTATE SERPL-SCNC: 0.9 MMOL/L (ref 0.5–2.2)
LYMPHOCYTES # BLD AUTO: 1.2 K/UL (ref 1–4.8)
LYMPHOCYTES NFR BLD: 21.8 % (ref 18–48)
MCH RBC QN AUTO: 26.9 PG (ref 27–31)
MCHC RBC AUTO-ENTMCNC: 31 G/DL (ref 32–36)
MCV RBC AUTO: 87 FL (ref 82–98)
MONOCYTES # BLD AUTO: 0.6 K/UL (ref 0.3–1)
MONOCYTES NFR BLD: 11.3 % (ref 4–15)
NEUTROPHILS # BLD AUTO: 3.3 K/UL (ref 1.8–7.7)
NEUTROPHILS NFR BLD: 61.7 % (ref 38–73)
NRBC BLD-RTO: 0 /100 WBC
PLATELET # BLD AUTO: 221 K/UL (ref 150–450)
PLATELET BLD QL SMEAR: ABNORMAL
PMV BLD AUTO: 11.1 FL (ref 9.2–12.9)
POTASSIUM SERPL-SCNC: 3.4 MMOL/L (ref 3.5–5.1)
PROT SERPL-MCNC: 6.8 G/DL (ref 6–8.4)
RBC # BLD AUTO: 3.76 M/UL (ref 4–5.4)
SODIUM SERPL-SCNC: 141 MMOL/L (ref 136–145)
TROPONIN I SERPL DL<=0.01 NG/ML-MCNC: <0.006 NG/ML (ref 0–0.03)
WBC # BLD AUTO: 5.41 K/UL (ref 3.9–12.7)

## 2023-10-04 PROCEDURE — 3008F PR BODY MASS INDEX (BMI) DOCUMENTED: ICD-10-PCS | Mod: CPTII,S$GLB,, | Performed by: INTERNAL MEDICINE

## 2023-10-04 PROCEDURE — 3008F BODY MASS INDEX DOCD: CPT | Mod: CPTII,S$GLB,, | Performed by: INTERNAL MEDICINE

## 2023-10-04 PROCEDURE — 96374 THER/PROPH/DIAG INJ IV PUSH: CPT

## 2023-10-04 PROCEDURE — 3288F FALL RISK ASSESSMENT DOCD: CPT | Mod: CPTII,S$GLB,, | Performed by: INTERNAL MEDICINE

## 2023-10-04 PROCEDURE — 99285 EMERGENCY DEPT VISIT HI MDM: CPT | Mod: 25

## 2023-10-04 PROCEDURE — 93010 ELECTROCARDIOGRAM REPORT: CPT | Mod: ,,, | Performed by: INTERNAL MEDICINE

## 2023-10-04 PROCEDURE — 3074F PR MOST RECENT SYSTOLIC BLOOD PRESSURE < 130 MM HG: ICD-10-PCS | Mod: CPTII,S$GLB,, | Performed by: INTERNAL MEDICINE

## 2023-10-04 PROCEDURE — 99215 OFFICE O/P EST HI 40 MIN: CPT | Mod: S$GLB,,, | Performed by: INTERNAL MEDICINE

## 2023-10-04 PROCEDURE — 99215 PR OFFICE/OUTPT VISIT, EST, LEVL V, 40-54 MIN: ICD-10-PCS | Mod: S$GLB,,, | Performed by: INTERNAL MEDICINE

## 2023-10-04 PROCEDURE — 99999 PR PBB SHADOW E&M-EST. PATIENT-LVL IV: CPT | Mod: PBBFAC,,, | Performed by: INTERNAL MEDICINE

## 2023-10-04 PROCEDURE — 63600175 PHARM REV CODE 636 W HCPCS

## 2023-10-04 PROCEDURE — 3044F HG A1C LEVEL LT 7.0%: CPT | Mod: CPTII,S$GLB,, | Performed by: INTERNAL MEDICINE

## 2023-10-04 PROCEDURE — 1100F PR PT FALLS ASSESS DOC 2+ FALLS/FALL W/INJURY/YR: ICD-10-PCS | Mod: CPTII,S$GLB,, | Performed by: INTERNAL MEDICINE

## 2023-10-04 PROCEDURE — 99999 PR PBB SHADOW E&M-EST. PATIENT-LVL IV: ICD-10-PCS | Mod: PBBFAC,,, | Performed by: INTERNAL MEDICINE

## 2023-10-04 PROCEDURE — 83605 ASSAY OF LACTIC ACID: CPT

## 2023-10-04 PROCEDURE — 3078F PR MOST RECENT DIASTOLIC BLOOD PRESSURE < 80 MM HG: ICD-10-PCS | Mod: CPTII,S$GLB,, | Performed by: INTERNAL MEDICINE

## 2023-10-04 PROCEDURE — 1100F PTFALLS ASSESS-DOCD GE2>/YR: CPT | Mod: CPTII,S$GLB,, | Performed by: INTERNAL MEDICINE

## 2023-10-04 PROCEDURE — 93010 EKG 12-LEAD: ICD-10-PCS | Mod: ,,, | Performed by: INTERNAL MEDICINE

## 2023-10-04 PROCEDURE — 96376 TX/PRO/DX INJ SAME DRUG ADON: CPT

## 2023-10-04 PROCEDURE — 3074F SYST BP LT 130 MM HG: CPT | Mod: CPTII,S$GLB,, | Performed by: INTERNAL MEDICINE

## 2023-10-04 PROCEDURE — 25500020 PHARM REV CODE 255: Performed by: EMERGENCY MEDICINE

## 2023-10-04 PROCEDURE — 3288F PR FALLS RISK ASSESSMENT DOCUMENTED: ICD-10-PCS | Mod: CPTII,S$GLB,, | Performed by: INTERNAL MEDICINE

## 2023-10-04 PROCEDURE — 85025 COMPLETE CBC W/AUTO DIFF WBC: CPT

## 2023-10-04 PROCEDURE — 80053 COMPREHEN METABOLIC PANEL: CPT | Performed by: EMERGENCY MEDICINE

## 2023-10-04 PROCEDURE — 84484 ASSAY OF TROPONIN QUANT: CPT

## 2023-10-04 PROCEDURE — 3044F PR MOST RECENT HEMOGLOBIN A1C LEVEL <7.0%: ICD-10-PCS | Mod: CPTII,S$GLB,, | Performed by: INTERNAL MEDICINE

## 2023-10-04 PROCEDURE — 93005 ELECTROCARDIOGRAM TRACING: CPT

## 2023-10-04 PROCEDURE — 83880 ASSAY OF NATRIURETIC PEPTIDE: CPT

## 2023-10-04 PROCEDURE — 3078F DIAST BP <80 MM HG: CPT | Mod: CPTII,S$GLB,, | Performed by: INTERNAL MEDICINE

## 2023-10-04 RX ORDER — HYDROMORPHONE HYDROCHLORIDE 1 MG/ML
0.5 INJECTION, SOLUTION INTRAMUSCULAR; INTRAVENOUS; SUBCUTANEOUS
Status: COMPLETED | OUTPATIENT
Start: 2023-10-04 | End: 2023-10-04

## 2023-10-04 RX ORDER — CIPROFLOXACIN 500 MG/1
500 TABLET ORAL 2 TIMES DAILY
Qty: 14 TABLET | Refills: 0 | Status: SHIPPED | OUTPATIENT
Start: 2023-10-04 | End: 2023-10-12

## 2023-10-04 RX ORDER — HYDROMORPHONE HYDROCHLORIDE 1 MG/ML
1 INJECTION, SOLUTION INTRAMUSCULAR; INTRAVENOUS; SUBCUTANEOUS
Status: COMPLETED | OUTPATIENT
Start: 2023-10-04 | End: 2023-10-04

## 2023-10-04 RX ADMIN — HYDROMORPHONE HYDROCHLORIDE 0.5 MG: 1 INJECTION, SOLUTION INTRAMUSCULAR; INTRAVENOUS; SUBCUTANEOUS at 10:10

## 2023-10-04 RX ADMIN — HYDROMORPHONE HYDROCHLORIDE 1 MG: 1 INJECTION, SOLUTION INTRAMUSCULAR; INTRAVENOUS; SUBCUTANEOUS at 08:10

## 2023-10-04 RX ADMIN — IOHEXOL 100 ML: 350 INJECTION, SOLUTION INTRAVENOUS at 09:10

## 2023-10-04 NOTE — ED PROVIDER NOTES
"Encounter Date: 10/4/2023       History     Chief Complaint   Patient presents with    Shortness of Breath     SOB increasing over the past several weeks. States she has "a lot of fluid on me". Sent by pulmonary clinic. Pt also states she has a UTI. 2L NC at baseline. Pt is lethargic, falling asleep at triage.      68-year-old female with past medical history congestive heart failure with elevated PA pressure, coronary artery disease, chronic suprapubic catheter presents to the ED with  at bedside complaining of shortness of breath and increased fluid overload "for the past few weeks".  She also notes intermittent chest pain that was present yesterday as well as some hematuria.  She uses 2 L nasal cannula at baseline.  She states that she recently was admitted to the hospital and was diuresed that her symptoms worsened which is why she presents the ED today.  She denies fever, chills, nausea, diarrhea, dysuria.     The history is provided by the patient, medical records and the spouse.     Review of patient's allergies indicates:   Allergen Reactions    (d)-limonene flavor      Other reaction(s): difficult intubation  Other reaction(s): Difficulty breathing    Bactrim [sulfamethoxazole-trimethoprim] Anaphylaxis    Benadryl [diphenhydramine hcl] Shortness Of Breath    Fentanyl Itching, Nausea And Vomiting and Swelling             Imitrex [sumatriptan succinate] Shortness Of Breath    Percocet [oxycodone-acetaminophen] Shortness Of Breath    Topamax [topiramate] Shortness Of Breath    Vancomycin Anaphylaxis     Rash    Butorphanol tartrate     Darvocet a500 [propoxyphene n-acetaminophen]      Other reaction(s): Difficulty breathing    Evening primrose (oenothera biennis)     Lyrica [pregabalin] Other (See Comments)     tremors    White petrolatum-zinc     Zinc oxide-white petrolatum      Other reaction(s): Difficulty breathing    Latex, natural rubber Itching and Rash    Phenytoin Rash and Other (See Comments) "     Trouble breathing     Past Medical History:   Diagnosis Date    Anxiety     Arthritis     Bilateral lower extremity edema     severe chronic    Carotid artery occlusion     Cataract     CHF (congestive heart failure)     Coronary artery disease     subtotalled LAD with collateral    Depression     Fever blister     Hard of hearing     Hypokalemia 01/09/2023    Hyponatremia 02/04/2022    Hypothyroid     Iron deficiency anemia     Lumbar radiculopathy     with chronic pain    Ocular migraine     Other emphysema 05/22/2023    Renal disorder     Sleep apnea     cpap     Past Surgical History:   Procedure Laterality Date    ADENOIDECTOMY      BACK SURGERY      x 12    CARDIAC CATHETERIZATION  2016    subtotalled LAD with right to left collaterals    CATARACT EXTRACTION W/  INTRAOCULAR LENS IMPLANT Left     Dr Coleman     CYSTOSCOPIC LITHOLAPAXY N/A 6/27/2019    Procedure: CYSTOLITHOLAPAXY;  Surgeon: Shireen Mayo MD;  Location: [facility] OR 2ND FLR;  Service: Urology;  Laterality: N/A;    CYSTOSCOPIC LITHOLAPAXY N/A 9/3/2019    Procedure: CYSTOLITHOLAPAXY;  Surgeon: Shireen Mayo MD;  Location: [facility] OR 2ND FLR;  Service: Urology;  Laterality: N/A;    CYSTOSCOPY N/A 7/13/2021    Procedure: CYSTOSCOPY;  Surgeon: Shireen Mayo MD;  Location: [facility] OR 1ST FLR;  Service: Urology;  Laterality: N/A;    CYSTOSCOPY  11/16/2021    Procedure: CYSTOSCOPY;  Surgeon: Shireen Mayo MD;  Location: [facility] OR 1ST FLR;  Service: Urology;;    CYSTOSCOPY  7/19/2022    Procedure: CYSTOSCOPY;  Surgeon: Shireen Mayo MD;  [facility]  Service: Urology;;    CYSTOSCOPY WITH INJECTION OF PERIURETHRAL BULKING AGENT  7/19/2022    Procedure: CYSTOSCOPY, WITH PERIURETHRAL BULKING AGENT INJECTION-MACROPLASTIQUE;  Surgeon: Shireen Mayo MD;  Location: [facility] OR 1ST FLR;  Service: Urology;;    CYSTOSCOPY WITH INJECTION OF PERIURETHRAL BULKING AGENT N/A 3/28/2023    Procedure: CYSTOSCOPY, WITH PERIURETHRAL BULKING AGENT  INJECTION;  Surgeon: Shireen Mayo MD;  [facility];  Service: Urology;  Laterality: N/A;  [place]    CYSTOSCOPY,WITH BOTULINUM TOXIN INJECTION N/A 12/13/2022    Procedure: CYSTOSCOPY,WITH BOTULINUM TOXIN INJECTION;  Surgeon: Shireen Mayo MD;  [facility];  Service: Urology;  Laterality: N/A;  300 U    CYSTOSCOPY,WITH BOTULINUM TOXIN INJECTION N/A 3/28/2023    Procedure: CYSTOSCOPY,WITH BOTULINUM TOXIN INJECTION;  Surgeon: Shireen Mayo MD;  Location: [facility] OR 1ST FLR;  Service: Urology;  Laterality: N/A;  45 MIN.    300 UNITS    ESOPHAGOGASTRODUODENOSCOPY N/A 5/23/2018    Procedure: ESOPHAGOGASTRODUODENOSCOPY (EGD);  Surgeon: Prince Vance MD;  Location: [facility] ENDO (4TH FLR);  Service: Endoscopy;  Laterality: N/A;  r/s 'd per Dr. Vance due to family emergency- ER    ESOPHAGOGASTRODUODENOSCOPY N/A 6/23/2023    Procedure: EGD (ESOPHAGOGASTRODUODENOSCOPY);  Surgeon: Juaquin Barry MD;  Location: [facility] ENDO (2ND FLR);  Service: Endoscopy;  Laterality: N/A;  Refferal: PRINCE VANCE  Order  tele enc 5/18/23  dx: history of a Nissen fundoplication  Additional Scheduling Information:  On home oxygen 2nd floor for airway protection also with a pain pump elevated BMI close to 40   Prep Gastroparesis   ins    HYSTERECTOMY  1975    endometriosis    INJECTION OF BOTULINUM TOXIN TYPE A  7/13/2021    Procedure: INJECTION, BOTULINUM TOXIN, 200units;  Surgeon: Shireen Mayo MD;  [facility];  Service: Urology;;    INJECTION OF BOTULINUM TOXIN TYPE A  11/16/2021    Procedure: INJECTION, BOTULINUM TOXIN, 200units;  Surgeon: Shireen Mayo MD;  [facility];  Service: Urology;;    INJECTION OF BOTULINUM TOXIN TYPE A  7/19/2022    Procedure: INJECTION, BOTULINUM TOXIN, 300 units ;  Surgeon: Shireen Mayo MD;  [facility];  Service: Urology;;    INSERTION, SUPRAPUBIC CATHETER N/A 12/13/2022    Procedure: INSERTION, SUPRAPUBIC  CATHETER;  Surgeon: Shireen Mayo MD;  Location: [facility] OR 1ST FLR;  Service: Urology;  Laterality: N/A;  exchange    pain pump placement      SQ Dilaudid Pump managed by Dr. Hillman, Pain Management    REMOVAL OF BONE SPUR OF FOOT Bilateral 9/16/2022    Procedure: EXCISION ARTHRITIC BONE, BILATERAL FOOT;  Surgeon: NICOLA Mcgrath;  Location: [facility] OR;  Service: Podiatry;  Laterality: Bilateral;    REPLACEMENT OF BACLOFEN PUMP N/A 8/2/2023    Procedure: REPLACEMENT, BACLOFEN PUMP;  Surgeon: Smitha Condon MD;  Location: [facility] OR 2ND FLR;  Service: Neurosurgery;  Laterality: N/A;  Anes: Gen  Blood: Type & Screen  Pos: Left Lat  Intrathecal Pump  Bed: Reg Reversed  Rad: C-arm  Pump: 40 cc, [employer]    REPLACEMENT OF CATHETER N/A 10/31/2019    Procedure: REPLACEMENT, CATHETER-SUPRAPUBIC;  Surgeon: Shireen Mayo MD;  Location: [facility] OR 1ST FLR;  Service: Urology;  Laterality: N/A;    SPINAL CORD STIMULATOR REMOVAL      before [place]    SPINE SURGERY  5-13-13    CERVICAL FUSION    TONSILLECTOMY       Family History   Problem Relation Age of Onset    Cancer Mother 55        breast    Cancer Father         esophagus,had laryngectomy    Esophageal cancer Father     Parkinsonism Maternal Grandmother     Tremor Maternal Grandmother     No Known Problems Brother     No Known Problems Brother     Heart disease Maternal Uncle     Colon cancer Maternal Uncle         Less than 60    No Known Problems Sister     No Known Problems Maternal Aunt     Cirrhosis Paternal Aunt         ETOH    Liver disease Paternal Aunt         ETOH    Liver disease Paternal Uncle         ETOH    Cirrhosis Paternal Uncle         ETOH    No Known Problems Maternal Grandfather     No Known Problems Paternal Grandmother     No Known Problems Paternal Grandfather     Melanoma Neg Hx     Amblyopia Neg Hx     Blindness Neg Hx     Cataracts Neg Hx     Diabetes Neg Hx     Glaucoma Neg Hx     Hypertension Neg Hx     Macular degeneration Neg Hx   "   Retinal detachment Neg Hx     Strabismus Neg Hx     Stroke Neg Hx     Thyroid disease Neg Hx     Celiac disease Neg Hx     Colon polyps Neg Hx     Cystic fibrosis Neg Hx     Crohn's disease Neg Hx     Inflammatory bowel disease Neg Hx     Liver cancer Neg Hx     Rectal cancer Neg Hx     Stomach cancer Neg Hx     Ulcerative colitis Neg Hx     Lymphoma Neg Hx      Social History     Tobacco Use    Smoking status: Never    Smokeless tobacco: Never   Substance Use Topics    Alcohol use: Never    Drug use: Yes     Types: Marijuana     Comment: "medical marijuana gummies"     Review of Systems    Physical Exam     Initial Vitals   BP Pulse Resp Temp SpO2   10/04/23 1513 10/04/23 1513 10/04/23 1513 10/04/23 1514 10/04/23 1513   (!) 97/53 66 20 97.9 °F (36.6 °C) 100 %      MAP       --                Physical Exam    Nursing note and vitals reviewed.  Constitutional: Vital signs are normal. She appears well-developed and well-nourished. She is not diaphoretic. She appears distressed.   HENT:   Head: Normocephalic and atraumatic.   Right Ear: External ear normal.   Left Ear: External ear normal.   Eyes: EOM are normal. Right eye exhibits no discharge. Left eye exhibits no discharge.   Neck: Trachea normal. Neck supple. No thyroid mass present.   Normal range of motion.  Cardiovascular:  Normal rate, regular rhythm, normal heart sounds and intact distal pulses.     Exam reveals no gallop and no friction rub.       No murmur heard.  Pulmonary/Chest: No respiratory distress. She has no wheezes. She has no rhonchi. She has rales.   Decreased depth of respirations.  Bibasilar crackles.   Abdominal: Abdomen is soft. Bowel sounds are normal. She exhibits distension. There is no abdominal tenderness. There is no rebound and no guarding.   Musculoskeletal:         General: Edema (Bilateral lower extremities extending up to the thighs.) present. No tenderness.      Cervical back: Normal range of motion and neck supple. "     Neurological: She is alert and oriented to person, place, and time. She has normal strength. No cranial nerve deficit or sensory deficit. GCS score is 15. GCS eye subscore is 4. GCS verbal subscore is 5. GCS motor subscore is 6.   Skin: Skin is dry. Capillary refill takes less than 2 seconds. No rash noted.   cool to touch.   Psychiatric: She has a normal mood and affect.         ED Course   Procedures  Labs Reviewed   CBC W/ AUTO DIFFERENTIAL - Abnormal; Notable for the following components:       Result Value    RBC 3.76 (*)     Hemoglobin 10.1 (*)     Hematocrit 32.6 (*)     MCH 26.9 (*)     MCHC 31.0 (*)     Immature Granulocytes 0.6 (*)     Platelet Estimate Clumped (*)     All other components within normal limits   COMPREHENSIVE METABOLIC PANEL - Abnormal; Notable for the following components:    Potassium 3.4 (*)     BUN 4 (*)     Albumin 2.9 (*)     Alkaline Phosphatase 153 (*)     ALT 6 (*)     All other components within normal limits   TROPONIN I   B-TYPE NATRIURETIC PEPTIDE   LACTIC ACID, PLASMA     EKG Readings: (Independently Interpreted)   58 beats per minute.  Sinus bradycardia.  Normal axis.  No obvious ST segment or T-wave ischemic changes.       Imaging Results              CTA Chest Non-Coronary (PE Studies) (In process)                      CT Limited Non-Billable (Final result)  Result time 10/04/23 21:25:51   Procedure changed from CTA Chest Non-Coronary (PE Studies)     Final result by Gurinder Ellis MD (10/04/23 21:25:51)                   Impression:      Nondiagnostic exam.    Electronically signed by resident: Aura Pino  Date:    10/04/2023  Time:    21:10    Electronically signed by: Gurinder Ellis MD  Date:    10/04/2023  Time:    21:25               Narrative:    EXAMINATION:  CT LIMITED NON-BILLABLE    CLINICAL HISTORY:  Pulmonary embolism (PE) suspected, unknown D-dimer;Extrav.;    TECHNIQUE:  Limited exam with only  image obtained.  75 cc Omni 350 IV  contrast was administered.    COMPARISON:  Chest radiograph 10/04/2023    FINDINGS:  Exam was terminated early due to extravasation of 75 cc Omni 350 IV administered contrast and subsequent lack of adequate IV access.   topogram image was obtained and appears grossly similar to same day chest radiograph.  No diagnostic CT cross-section imaging was obtained.                                       X-Ray Chest AP Portable (Final result)  Result time 10/04/23 17:25:56      Final result by Florencio Rosado MD (10/04/23 17:25:56)                   Impression:      No radiographic acute intrathoracic process seen on this limited single view noting somewhat improved aeration from prior.      Electronically signed by: Florencio Rosado MD  Date:    10/04/2023  Time:    17:25               Narrative:    EXAMINATION:  XR CHEST AP PORTABLE    CLINICAL HISTORY:  CHF;    TECHNIQUE:  Single frontal view of the chest was performed.    COMPARISON:  Chest radiograph 10/01/2023    FINDINGS:  Patient is somewhat rotated.  Monitoring leads overlie the chest.  Resolution is limited by body habitus with underpenetration.    Cardiomediastinal silhouette is midline and prominent with similar calcification and tortuosity of the aorta and enlarged heart.  Pulmonary vasculature and hilar contours are within normal limits.  Scattered linear opacities throughout the lung bases consistent with mild platelike scarring versus atelectasis.  The lungs are otherwise well expanded without large consolidation, pleural effusion or pneumothorax noting overall improved aeration from prior.  No acute osseous process seen.  PA and lateral views can be obtained.                                       Medications   iohexoL (OMNIPAQUE 350) injection 100 mL (100 mLs Intravenous Given 10/4/23 2159)   HYDROmorphone injection 1 mg (1 mg Intravenous Given 10/4/23 2030)     Medical Decision Making  68-year-old female who appears to be in mild-to-moderate distress  presents the ED with  at bedside complaining of shortness of breath.  ABC's intact.  Initial triage vital signs reveal borderline hypotension and otherwise unremarkable.    Differential diagnosis includes but is not limited to pneumonia, pulmonary edema, pulmonary embolism, ACS, electrolyte abnormality, anemia.    Amount and/or Complexity of Data Reviewed  Labs: ordered. Decision-making details documented in ED Course.  Radiology: ordered. Decision-making details documented in ED Course.    Risk  Prescription drug management.      Additional MDM:     Well's Criteria Score:  -Clinical symptoms of DVT (leg swelling, pain with palpation) = 0.0  -PE is #1 diagnosis OR equally likely =            0.0  -Heart Rate >100 =   0.0  -Immobilization (= or > than 3 days) or surgery in the previous 4 weeks = 1.5  -Previous DVT/PE = 0.0  -Hemoptysis =          0.0  -Malignancy =           0.0  Well's Probability Score =    1.5              ED Course as of 10/04/23 2226   Wed Oct 04, 2023   1654 Concern for possible pneumonia versus pulmonary edema given history of half a pack with severe peripheral edema, metabolic acidosis secondary to overwhelming infection resulting in tachypnea, versus ongoing issues with home oxygen use.  Patient is in no distress on examination.  Will obtain BNP, CBC, chemistry.  Will obtain chest x-ray.  Will check renal function.  Will diurese if clinically indicated and safe to do so. [DS]   1800 Following initial evaluation I ordered labs and imaging were obtained with the patient's complaint acute on chronic worsening shortness of breath and fluid overloaded status.  I did not give the patient Lasix as the patient was borderline hypotensive in triage and initial evaluation.  The patient's comorbidities I am also concerned pulmonary embolism. [BG]   1908 Troponin I: <0.006  Decreases likelihood of ACS in the setting of no active chest pain and shortness of breath for weeks.  EKG also did not  reveal any acute gross ischemic changes. [BG]   1909 Lactate, Tho: 0.9  Within normal limits. [BG]   1909 BNP: 33  Within normal limits. [BG]   1910 Hemoglobin(!): 10.1  Hemoglobin is lower than at baseline but there are no active signs or symptoms hemorrhage at this time. [BG]   1911 X-Ray Chest AP Portable  No convincing evidence of a large consolidation, pleural effusion or pneumothorax.   [BG]   2035 RN notified me that the patient is complaining of back pain.  Patient normally receives approximately 9 mg of Dilaudid through a pain pump for her back pain.  I will give her 1 mg of Dilaudid here in the emergency department. [BG]   2036 RN also notified me that the patient's IV blew as CT was administering contrast.  I obtained a new 20 gauge IV in the right forearm. [BG]   2226 Patient is active pending CT PE and ultimate disposition.  I will sign out patient to oncoming provider. [BG]      ED Course User Index  [BG] Derick Lee MD  [DS] Kristian Degroot MD                    Clinical Impression:   Final diagnoses:  [R06.02] Shortness of breath               Derick Lee MD  Resident  10/04/23 2226

## 2023-10-04 NOTE — ASSESSMENT & PLAN NOTE
Patient with Hypercapnic and Hypoxic Respiratory failure which is Acute on chronic.  she is on home oxygen at 2 LPM. Supplemental oxygen was provided and noted- [unfilled].   Signs/symptoms of respiratory failure include- increased work of breathing and use of accessory muscles. Contributing diagnoses includes - CHF Labs and images were reviewed. Patient Has recent ABG, which has been reviewed. Will treat underlying causes and adjust management of respiratory failure as follows-   - 86% on RA started on 2L NC, likely due to HFpEF and pulmonary hypertension. Needs large volume diuresis. Should be transitioned to PO and monitor output prior to D/C as had had many readmission and concerned about under diuresis.

## 2023-10-04 NOTE — ED TRIAGE NOTES
Pt arrives to ED with SOB and on 2L NC. Pt states she uses 5L oxygen at home. Pt states she has SOB for the last few weeks. Pt arrives with a meadows in place. Pt has severe edema of the lower extremities. Pt denies CP at this time but states she had some yesterday.

## 2023-10-04 NOTE — PROGRESS NOTES
Subjective:      Patient ID: Tasha Hawley is a 68 y.o. female.    Chief Complaint: Shortness of Breath    Pt is a 66 yo CW pmh chronic pain syndrome, HFpEF, narcotic dependency, neurogenic bladder s/p suprapubic catheter, CKD3a, hypothyroidism, obesity, GERD with hiatal hernia, chronic hypercapnic respiratory failure who presents for preop pulmonary risk stratification. Patient has had hospitalizations for PNA and acute on chronic diastolic heart failure. Has trouble with oxygen tanks.      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  Exposure hx:  worked on air conditions and worked with asbestos.  Inhaler use: Spiriva  PRN inhaler use: nebulizer     The past several weeks breathing has been getting worse. Has been having falls. Legs started getting bigger. UOP has decreased despite taking lasix. Hard to bend legs and walk with how heavy they are. Worsened orthopnea.     Review of Systems   Constitutional:  Positive for fatigue. Negative for weight loss, weight gain and activity change.   Respiratory:  Positive for cough, sputum production, shortness of breath, dyspnea on extertion and use of rescue inhaler. Negative for wheezing and previous hospitialization due to pulmonary problems.    Cardiovascular:  Positive for leg swelling. Negative for chest pain and palpitations.   Gastrointestinal:  Positive for vomiting and acid reflux. Negative for nausea.   Neurological:  Negative for syncope and light-headedness.   Psychiatric/Behavioral:  Negative for confusion and sleep disturbance. The patient is not nervous/anxious.      Objective:     Physical Exam   Constitutional: She is oriented to person, place, and time. She appears well-developed. She is obese.   HENT:   Head: Normocephalic.   Cardiovascular: Normal rate, regular rhythm and normal heart sounds. Exam reveals no gallop and no friction rub.   No murmur heard.  Pulmonary/Chest: Symmetric chest wall expansion and effort  normal. No stridor. No respiratory distress. She has decreased breath sounds. She has no wheezes. She has no rhonchi. She has rales (bibasilar).   Musculoskeletal:         General: Edema (bilateral to hips) present.   Neurological: She is alert and oriented to person, place, and time. Gait abnormal.   Skin: No cyanosis. Nails show no clubbing.   Psychiatric: She has a normal mood and affect. Her behavior is normal. Judgment and thought content normal.   Vitals reviewed.    Personal Diagnostic Review    Chest x-ray: 7/8/23  Bilateral cephalization indicated increased pulmonary vascular congestion.      CT of chest performed on 3/24/23 PE protocol revealed LLL discoid atelectasis vs scarring. Hiatal hernia.      Echocardiogram: 4/29/23  There is no evidence of intracardiac shunting.  The left ventricle is normal in size with normal systolic function.  The estimated ejection fraction is 65%.  Normal left ventricular diastolic function.  Normal right ventricular size with normal right ventricular systolic function.  The estimated PA systolic pressure is 42 mmHg.  Normal central venous pressure (3 mmHg).     PFT: 7/18/23  FEV1:  1.25L (53.1%)  FVC: 1.49L (53.1%)  FEV1/FVC: 83  TLC: 3.97L (83.3%)  RV: (127.9%)  DLCO: 8.99 (42.3%)    The test was completed without stopping. The total time walked was 360 seconds. During walking, the patient reported:  No complaints. The patient used a walker for assistance during testing.      07/18/2023---------Distance: 170.69 meters (560 feet)       O2 Sat % Supplemental Oxygen Heart Rate Blood Pressure Britta Scale   Pre-exercise  (Resting) 92 % Room Air 75 bpm 130/60 mmHg 0   During Exercise 93 % Room Air 83 bpm 127/58 mmHg 1   Post-exercise  (Recovery) 94 % Room Air  78 bpm          Recovery Time: 51 seconds     Performing nurse/tech: Samy MENDOZA     PREVIOUS STUDY:   The patient has not had a previous study.     CLINICAL INTERPRETATION:  Six minute walk distance is 170.69 meters (560  "feet) with very light dyspnea.  During exercise, there was no significant desaturation while breathing room air.  Both blood pressure and heart rate remained stable with walking.  The patient did not report non-pulmonary symptoms during exercise.  Significant exercise impairment is likely due to subjective symptoms.  The patient did complete the study, walking 360 seconds of the 360 second test.  No previous study performed.  Based upon age and body mass index, exercise capacity is less than predicted.        10/2/2023     4:02 PM 10/1/2023     2:29 AM 10/1/2023     2:05 AM 10/1/2023     1:20 AM 10/1/2023     1:02 AM 10/1/2023    12:05 AM 9/30/2023    11:22 PM   Pulmonary Function Tests   SpO2 89 % 97 % 96 % 97 % 91 % 92 % 93 %   Height 5' 4" (1.626 m)         Weight 103 kg (227 lb)         BMI (Calculated) 38.9           Assessment:     1. Chronic diastolic heart failure    2. Pulmonary hypertension due to diastolic systemic ventricular dysfunction    3. Chronic respiratory failure with hypoxia and hypercapnia      Outpatient Encounter Medications as of 10/4/2023   Medication Sig Dispense Refill    amoxicillin-clavulanate 875-125mg (AUGMENTIN) 875-125 mg per tablet Take 1 tablet by mouth 2 (two) times daily. 14 tablet 0    aspirin (ECOTRIN) 81 MG EC tablet Take 1 tablet (81 mg total) by mouth once daily. 90 tablet 3    atorvastatin (LIPITOR) 80 MG tablet Take 1 tablet (80 mg total) by mouth once daily. 90 tablet 3    butalbital-acetaminophen-caffeine -40 mg (FIORICET, ESGIC) -40 mg per tablet Take 1 tablet by mouth daily as needed.      ciprofloxacin HCl (CIPRO) 500 MG tablet Take 1 tablet (500 mg total) by mouth 2 (two) times daily. for 7 days 14 tablet 0    clindamycin (CLEOCIN) 300 MG capsule Take 1 capsule (300 mg total) by mouth every 8 (eight) hours. 30 capsule 0    cyanocobalamin, vitamin B-12, 5,000 mcg Subl Place 1 tablet under the tongue once daily.      darifenacin (ENABLEX) 7.5 MG Tb24 Take " 7.5 mg by mouth.      diclofenac sodium (VOLTAREN ARTHRITIS PAIN) 1 % Gel Apply 4 g topically 4 (four) times daily. 150 g 5    docusate sodium (COLACE) 100 MG capsule Take 200 mg by mouth every evening.      ferrous gluconate (FERGON) 324 MG tablet Take 1 tablet (324 mg total) by mouth daily with breakfast. 90 tablet 1    fludrocortisone (FLORINEF) 0.1 mg Tab Take a half-tablet (50 mcg) by mouth once daily 15 tablet 5    FLUoxetine 20 MG capsule Take 3 capsules (60 mg total) by mouth once daily. 270 capsule 3    fluticasone propionate (FLONASE) 50 mcg/actuation nasal spray 2 sprays (100 mcg total) by Each Nostril route once daily. 16 g 0    furosemide (LASIX) 40 MG tablet Take 2 tablets (80 mg total) by mouth 2 (two) times a day. 360 tablet 3    HYDROcodone-acetaminophen (NORCO)  mg per tablet Take 1 tablet by mouth every 6 (six) hours as needed. 30 tablet 0    hydrocortisone (CORTEF) 10 MG Tab Take 1 tablet (10 mg total) by mouth every evening. 30 tablet 5    hydrOXYzine (ATARAX) 50 MG tablet Take 1 tablet (50 mg total) by mouth every 4 (four) hours as needed for Anxiety. 30 tablet 0    intrathecal pain pump compound Hydromorphone (7.5 mg/mL) infusion at 8.59 mg/day (0.3578 mg/hr) out of a total reservoir volume of 40 mL  Pump filled monthly      ketorolac (TORADOL) 10 mg tablet Take 10 mg by mouth daily as needed.      levothyroxine (SYNTHROID) 150 MCG tablet Take 1 tablet (150 mcg total) by mouth before breakfast. 90 tablet 3    LIDOcaine (LIDODERM) 5 % Place 1 patch onto the skin once daily. Remove & Discard patch within 12 hours or as directed by MD  0    liothyronine (CYTOMEL) 5 MCG Tab Take 1 tablet (5 mcg total) by mouth once daily. 30 tablet 11    nitroGLYCERIN (NITROSTAT) 0.4 MG SL tablet Place 0.4 mg under the tongue every 5 (five) minutes as needed for Chest pain.      nystatin (MYCOSTATIN) powder Apply topically 4 (four) times daily. 60 g 0    ondansetron (ZOFRAN-ODT) 4 MG TbDL Take 4 mg by mouth  every 4 to 6 hours as needed.      pantoprazole (PROTONIX) 40 MG tablet Take 1 tablet (40 mg total) by mouth once daily. 90 tablet 3    potassium chloride (MICRO-K) 10 MEQ CpSR Take 2 capsules (20 mEq total) by mouth once daily. 60 capsule 11    promethazine (PHENERGAN) 25 MG tablet Take 25 mg by mouth every 6 (six) hours as needed for Nausea.      QUEtiapine (SEROQUEL) 100 MG Tab TAKE 2 TABLETS (200 MG) BY MOUTH NIGHTLY (Patient taking differently: Take 200 mg by mouth nightly.) 180 tablet 3    senna (SENNA LAX) 8.6 mg tablet Take 2 tablets by mouth 2 (two) times daily.      tiotropium bromide (SPIRIVA RESPIMAT) 2.5 mcg/actuation inhaler Inhale 2 puffs into the lungs once daily. Controller 4 g 1    traMADoL (ULTRAM) 50 mg tablet Take 1 tablet (50 mg total) by mouth every 4 (four) hours as needed for Pain. 30 tablet 0    traZODone (DESYREL) 300 MG tablet Take 1 tablet (300 mg total) by mouth every evening. 90 tablet 3    [DISCONTINUED] amitriptyline (ELAVIL) 25 MG tablet Take 1 tablet (25 mg total) by mouth every evening. 30 tablet 11    [DISCONTINUED] HYDROcodone-acetaminophen (NORCO)  mg per tablet Take 1 tablet by mouth every 6 (six) hours as needed for breakthrough pain.      [DISCONTINUED] ketorolac (TORADOL) 30 mg/mL (1 mL) injection Inject 30 mg into the vein once.      [DISCONTINUED] levothyroxine (SYNTHROID) 125 MCG tablet TAKE ONE TABLET BY MOUTH EVERY DAY 90 tablet 3    [DISCONTINUED] levothyroxine (SYNTHROID) 150 MCG tablet Take 1 tablet (150 mcg total) by mouth before breakfast. 30 tablet 11    [DISCONTINUED] liothyronine (CYTOMEL) 25 MCG Tab Take 1 tablet (25 mcg total) by mouth once daily. 30 tablet 11    [DISCONTINUED] loperamide HCl (IMODIUM A-D ORAL) Take by mouth as needed. Diarrhea      [DISCONTINUED] tiZANidine (ZANAFLEX) 4 MG tablet Take 4 mg by mouth 2 (two) times daily as needed.      [DISCONTINUED] traMADoL (ULTRAM) 50 mg tablet       [DISCONTINUED] traMADoL (ULTRAM) 50 mg tablet Take  50 mg by mouth every 4 (four) hours as needed.      [DISCONTINUED] traMADoL (ULTRAM) 50 mg tablet Take 1 tablet (50 mg total) by mouth every 4 (four) hours as needed for Pain. 30 tablet 0    [] cefTRIAXone (ROCEPHIN) 1 g in dextrose 5 % in water (D5W) 100 mL IVPB (MB+)       [] HYDROmorphone injection 0.2 mg       [DISCONTINUED] 0.9%  NaCl infusion       [DISCONTINUED] ampicillin-sulbactam (UNASYN) 1.5 g in sodium chloride 0.9 % 100 mL IVPB (MB+)       [DISCONTINUED] ampicillin-sulbactam (UNASYN) 3 g in sodium chloride 0.9 % 100 mL IVPB (MB+)       [DISCONTINUED] aspirin EC tablet 81 mg       [DISCONTINUED] atorvastatin tablet 80 mg       [DISCONTINUED] dextrose 10% bolus 125 mL 125 mL       [DISCONTINUED] dextrose 10% bolus 250 mL 250 mL       [DISCONTINUED] enoxaparin injection 40 mg       [DISCONTINUED] famotidine tablet 20 mg       [DISCONTINUED] fluconazole tablet 100 mg       [DISCONTINUED] FLUoxetine capsule 60 mg       [DISCONTINUED] fluticasone propionate 50 mcg/actuation nasal spray 100 mcg       [DISCONTINUED] furosemide injection 40 mg       [DISCONTINUED] furosemide injection 80 mg       [DISCONTINUED] furosemide tablet 80 mg       [DISCONTINUED] glucagon (human recombinant) injection 1 mg       [DISCONTINUED] glucose chewable tablet 16 g       [DISCONTINUED] glucose chewable tablet 24 g       [DISCONTINUED] HYDROcodone-acetaminophen  mg per tablet 1 tablet       [DISCONTINUED] HYDROmorphone injection 0.5 mg       [DISCONTINUED] HYDROMORPHONE intrathecal pain pump compound       [DISCONTINUED] levothyroxine tablet 150 mcg       [DISCONTINUED] miconazole nitrate 2% ointment       [DISCONTINUED] naloxone 0.4 mg/mL injection 0.02 mg       [DISCONTINUED] potassium chloride SA CR tablet 20 mEq       [DISCONTINUED] QUEtiapine tablet 200 mg       [DISCONTINUED] senna tablet 3 tablet       [DISCONTINUED] sodium chloride 0.9% flush 10 mL       [DISCONTINUED] tiotropium bromide 2.5  mcg/actuation inhaler 2 puff       [DISCONTINUED] traZODone tablet 300 mg       [DISCONTINUED] traZODone tablet 300 mg        No facility-administered encounter medications on file as of 10/4/2023.     No orders of the defined types were placed in this encounter.      Plan:     Acute on chronic respiratory failure with hypoxia and hypercapnia  Patient with Hypercapnic and Hypoxic Respiratory failure which is Acute on chronic.  she is on home oxygen at 2 LPM. Supplemental oxygen was provided and noted- [unfilled].   Signs/symptoms of respiratory failure include- increased work of breathing and use of accessory muscles. Contributing diagnoses includes - CHF Labs and images were reviewed. Patient Has recent ABG, which has been reviewed. Will treat underlying causes and adjust management of respiratory failure as follows-   - 86% on RA started on 2L NC, likely due to HFpEF and pulmonary hypertension. Needs large volume diuresis. Should be transitioned to PO and monitor output prior to D/C as had had many readmission and concerned about under diuresis.     Sending to ED.     Nick Chilel MD  [facility]

## 2023-10-04 NOTE — ED NOTES
US team at bedside to obtain US IV access.    [Normal Growth] : growth [Normal Development] : development [None] : No known medical problems [No Elimination Concerns] : elimination [No Feeding Concerns] : feeding [No Skin Concerns] : skin [Normal Sleep Pattern] : sleep [Family Adaptation] : family adaptation [Infant [place]] : infant independence [Feeding Routine] : feeding routine [Safety] : safety [No Medications] : ~He/She~ is not on any medications [Parent/Guardian] : parent/guardian [] : The components of the vaccine(s) to be administered today are listed in the plan of care. The disease(s) for which the vaccine(s) are intended to prevent and the risks have been discussed with the caretaker.  The risks are also included in the appropriate vaccination information statements which have been provided to the patient's caregiver.  The caregiver has given consent to vaccinate. [FreeTextEntry1] : 9mo F seen for WCC.\par Hep B #3 today.\par Influenza declined. \par Anticipatory guidance as discussed above.\par SWYC reviewed.\par RTO 3mo for 12mo C.

## 2023-10-05 VITALS
SYSTOLIC BLOOD PRESSURE: 152 MMHG | BODY MASS INDEX: 40.34 KG/M2 | OXYGEN SATURATION: 95 % | TEMPERATURE: 98 F | DIASTOLIC BLOOD PRESSURE: 65 MMHG | WEIGHT: 235 LBS | HEART RATE: 79 BPM | RESPIRATION RATE: 20 BRPM

## 2023-10-05 PROCEDURE — 25000003 PHARM REV CODE 250

## 2023-10-05 RX ORDER — HYDROCODONE BITARTRATE AND ACETAMINOPHEN 5; 325 MG/1; MG/1
1 TABLET ORAL ONCE
Status: COMPLETED | OUTPATIENT
Start: 2023-10-05 | End: 2023-10-05

## 2023-10-05 RX ADMIN — HYDROCODONE BITARTRATE AND ACETAMINOPHEN 1 TABLET: 5; 325 TABLET ORAL at 01:10

## 2023-10-05 NOTE — PROVIDER PROGRESS NOTES - EMERGENCY DEPT.
Encounter Date: 10/4/2023    ED Physician Progress Notes            ED Resident HAND-OFF NOTE:  Tasha Hawley is a 68 y.o. female who presented to the ED on 10/5/2023, patient C/O shortness of breath. I assumed care of patient from off-going ED physician team patient pending CT PE.    Briefly, patient has a history of congestive heart failure, CAD, chronic back pain with intrathecal pump presenting for shortness of breath and lower extremity swelling.  Patient states that she has been compliant with home Lasix but is complaining of fluid overload.  Upon chart review, previous attempts at diuresis resulted in renal failure so previous team waited for labs to diurese.  Patient does have lymphedema in her bilateral lower extremities but based on review of media from podiatry appointment 3 days ago, lymphedema and erythema appears improved.  BNP within normal limits, no signs of fluid overload on chest x-ray.         10/2       Media Information        10/4     Media Information         Media Information          On my evaluation, Tasha Hawley appears well, hemodynamically stable and in NAD. Thus far, Tasha Hawley has received:  Medications   iohexoL (OMNIPAQUE 350) injection 100 mL (100 mLs Intravenous Given 10/4/23 2159)   HYDROmorphone injection 1 mg (1 mg Intravenous Given 10/4/23 2030)   HYDROmorphone injection 0.5 mg (0.5 mg Intravenous Given 10/4/23 2240)   HYDROcodone-acetaminophen 5-325 mg per tablet 1 tablet (1 tablet Oral Given 10/5/23 0124)       BP (!) 122/58   Pulse 86   Temp 97.9 °F (36.6 °C)   Resp 20   Wt 106.6 kg (235 lb)   LMP  (LMP Unknown)   SpO2 95%   Breastfeeding No   BMI 40.34 kg/m²         Disposition: I anticipate patient will   Depend on CT PE study  ______________________  Raegan Duvall MD   Emergency Medicine Resident      UPDATE:     CT PE study with poorly time contrast.  Low suspicion for pulmonary embolism since patient is not tachycardic and is currently on  3 L nasal cannula when she is typically on 5 L nasal cannula at home.  No signs of pleural effusion or pulmonary edema on CT as well.  No other pathology noted.  Patient endorsing significant back pain, asking for IV Dilaudid.  Patient given home narcotic medications.    Discussed all.  Discussed possibility of finding nursing home/rehab placement for patient and patient adamantly refuses.  Patient is hemodynamically stable with improving oxygen requirements, no signs of acute pathology, she is appropriate for discharge to home.      :  Shortness of breath  Lymphedema (Primary)

## 2023-10-05 NOTE — ED NOTES
Received pt AAO and in NAD.  Pt breathing E/U on 2L NC.  Call bell in reach with bed rails up x2.  Pt placed on continuous cardiac, O2, and BP monitoring.  Pt and family advised of plan of care to include all test and procedures. Patient stable at this time; will continue with plan of care.

## 2023-10-05 NOTE — PLAN OF CARE
During power injection of contrast for CTA chest, 75 cc of Omnipaque 350 IV contrast extravasated into the soft tissues of the pt's right forearm. The pt remained neurovascularly intact with no pain at the site of extravasation. The ordering team was recommended to order a cold pack, to be placed for 1 hour on, 1 hour off for 4 cycles to be completed in total. The right forearm is to be elevated until the local swelling subsides. The arm is to be assessed Q30min x2 hours, then Q4H until discontinued. The pt's nurse and primary team were informed of the situation.     Shira Tapia MD  Radiology, PGY III  [facility]

## 2023-10-05 NOTE — DISCHARGE INSTRUCTIONS
Diagnosis: Leg swelling      CTA Chest Non-Coronary (PE Studies)   Final Result      Suboptimal study due to suboptimal contrast bolus timing for the evaluation of the pulmonary arteries, respiratory motion and streak artifact from the patient's upper extremities.  No large central pulmonary embolus identified to the level of the distal lobar arteries on limited evaluation.      Extravasated contrast is noted in the soft tissues of the right upper extremity extending to the axilla.  Recommend clinical follow-up.      Moderate size hiatal hernia.      Additional findings as above.      Electronically signed by resident: Aura Pino   Date:    10/04/2023   Time:    22:46      Electronically signed by: Gurinder Ellis MD   Date:    10/04/2023   Time:    23:33      CT Limited Non-Billable   Final Result      Nondiagnostic exam.      Electronically signed by resident: Aura Pino   Date:    10/04/2023   Time:    21:10      Electronically signed by: Gurinder Ellis MD   Date:    10/04/2023   Time:    21:25      X-Ray Chest AP Portable   Final Result      No radiographic acute intrathoracic process seen on this limited single view noting somewhat improved aeration from prior.         Electronically signed by: Florencio Rosado MD   Date:    10/04/2023   Time:    17:25          Follow-Up Plan:  - Follow-up with primary care doctor within 3 - 5 days  - Additional testing and/or evaluation as directed by your primary doctor    Return to the Emergency Department for symptoms including but not limited to: worsening symptoms, shortness of breath or chest pain, vomiting with inability to hold down fluids, fevers greater than 100.4°F, passing out/fainting/unconsciousness, or other concerning symptoms.

## 2023-10-06 ENCOUNTER — TELEPHONE (OUTPATIENT)
Dept: ENDOSCOPY | Facility: HOSPITAL | Age: 68
End: 2023-10-06
Payer: MEDICARE

## 2023-10-06 NOTE — TELEPHONE ENCOUNTER
----- Message from Betito Nicholas sent at 10/6/2023 11:46 AM CDT -----  Regarding: adv  Contact: @[phone]  Pt calling to speak with provider no reason provided ... Pls call and adv@[phone]

## 2023-10-06 NOTE — PRE-PROCEDURE INSTRUCTIONS
Pt is scheduled for a procedure with Dr. Mayo on 10/19/23 @ [facility]. Upon chart review, pt was found to have PA of 51. She meets exclusionary criteria for this facility.  notified.    Echo 8/11/23:  Left Ventricle: The left ventricle is normal in size. Normal wall thickness. Normal wall motion. There is normal systolic function. Ejection fraction by visual approximation is 65%. There is normal diastolic function.    Left Atrium: Left atrium is mildly dilated.    Right Ventricle: Systolic function is normal.    Pulmonary Artery: The estimated pulmonary artery systolic pressure is 51 mmHg.    IVC/SVC: Elevated venous pressure at 15 mmHg.

## 2023-10-09 ENCOUNTER — TELEPHONE (OUTPATIENT)
Dept: UROLOGY | Facility: CLINIC | Age: 68
End: 2023-10-09
Payer: MEDICARE

## 2023-10-09 NOTE — TELEPHONE ENCOUNTER
----- Message from Swetha Hernandez sent at 10/9/2023  1:51 PM CDT -----  Regarding: pt advice  Contact: Chloe mike/[facility] @[phone]  Caller requesting verbal order to change the catheter. Pls call to discuss.

## 2023-10-09 NOTE — TELEPHONE ENCOUNTER
Returned call to Chloe. States pt is back with [facility] for monthly suprapubic catheter changes. Asked for verbal order to continue. Gave v/o for monthly sptube changes with 20 Fr silicone catheter, 30 mL balloon. PRN SN visits for UTI s/s and [facility]. For UTI, urine must be collected from new catheter and urine sent for c&s. Results to be faxed to clinic. Provided fax number.

## 2023-10-11 ENCOUNTER — TELEPHONE (OUTPATIENT)
Dept: INTERNAL MEDICINE | Facility: CLINIC | Age: 68
End: 2023-10-11
Payer: MEDICARE

## 2023-10-11 NOTE — TELEPHONE ENCOUNTER
I saw her on 9/22 and put in a referral to the lymphedema physical therapy clinic.  She just needs to make the appointment.    D

## 2023-10-11 NOTE — TELEPHONE ENCOUNTER
Spoke to patient and she states she is having a lot of swelling and wants to go to a lymphemia clinic. Is there a place that she can  go for the lymphemia? Or would you like to see her here in clinic? Please advise LB

## 2023-10-11 NOTE — TELEPHONE ENCOUNTER
----- Message from Dashawn Magdaleno sent at 10/11/2023 11:13 AM CDT -----  Contact: [phone]  [email or web address]     Patient is calling for Medical Advice regarding: pt would like a f/u call to speak with nurse. I couldn't understand anything pt was saying she just said have office call her and disconnected call.     How long has patient had these symptoms:    Pharmacy name and phone#:    Would like response via [employer]:  no     Comments:

## 2023-10-11 NOTE — TELEPHONE ENCOUNTER
Ok I'll try and help facilitate it. LB    Spoke to patient a gave her the number to call to schedule the PT for lymphedema.

## 2023-10-12 ENCOUNTER — TELEPHONE (OUTPATIENT)
Dept: PODIATRY | Facility: CLINIC | Age: 68
End: 2023-10-12
Payer: MEDICARE

## 2023-10-12 ENCOUNTER — TELEPHONE (OUTPATIENT)
Dept: INTERNAL MEDICINE | Facility: CLINIC | Age: 68
End: 2023-10-12
Payer: MEDICARE

## 2023-10-12 ENCOUNTER — PATIENT MESSAGE (OUTPATIENT)
Dept: RESPIRATORY THERAPY | Facility: HOSPITAL | Age: 68
End: 2023-10-12
Payer: MEDICARE

## 2023-10-12 ENCOUNTER — TELEPHONE (OUTPATIENT)
Dept: HEMATOLOGY/ONCOLOGY | Facility: CLINIC | Age: 68
End: 2023-10-12
Payer: MEDICARE

## 2023-10-12 DIAGNOSIS — M79.662 PAIN IN BOTH LOWER LEGS: Primary | ICD-10-CM

## 2023-10-12 DIAGNOSIS — M79.661 PAIN IN BOTH LOWER LEGS: Primary | ICD-10-CM

## 2023-10-12 DIAGNOSIS — G47.30 SLEEP APNEA, UNSPECIFIED TYPE: Primary | ICD-10-CM

## 2023-10-12 RX ORDER — NALOXONE HYDROCHLORIDE 4 MG/.1ML
1 SPRAY NASAL ONCE
Qty: 1 EACH | Refills: 0 | Status: SHIPPED | OUTPATIENT
Start: 2023-10-12 | End: 2023-10-12

## 2023-10-12 NOTE — TELEPHONE ENCOUNTER
----- Message from Alisa Garzon sent at 10/12/2023 12:19 PM CDT -----  Good afternoon,    In order to mitigate the risk of an opioid overdose, the CDC recommends consideration of naloxone therapy for at-risk patients, such as those having a history of overdose or a substance use disorder, higher opioid dosages (greater than or equal to 50 MME/day), or concomitant benzodiazepine use. For additional information on naloxone therapy, please review the Naloxone FAQ document.  Verify the patient is still taking opioid therapy.  Confirm the patient does not already have naloxone therapy on hand.    Recommend initiation of appropriate naloxone therapy.  Naloxone injection  Narcan Nasal Spray    If there are any questions or concerns, please contact the pharmacy. Thank you!

## 2023-10-12 NOTE — TELEPHONE ENCOUNTER
----- Message from Karla Barboza sent at 10/12/2023  3:43 PM CDT -----  Contact: [phone]  Patient called, stated that she received a call back from the lemphedema clinic, would like to get a call back from that office but does not have the phone number. Please advise. Thank you.

## 2023-10-12 NOTE — TELEPHONE ENCOUNTER
----- Message from Adriana Garzon sent at 10/12/2023  1:46 PM CDT -----  Type:  Sooner Apoointment Request    Caller is requesting a sooner appointment.  Caller declined first available appointment listed below.  Caller will not accept being placed on the waitlist and is requesting a message be sent to doctor.  Name of Caller: Pt  When is the first available appointment?  Symptoms:  Would the patient rather a call back or a response via [facility]? Call  Best Call Back Number: [phone]  Additional Information:

## 2023-10-15 NOTE — TELEPHONE ENCOUNTER
----- Message from Amanda Grande sent at 8/13/2018  1:39 PM CDT -----  Contact: [facility] [phone]  Type: Home Health (orders, updates, clarifications, etc.)    Home Health Agency/Nurse: [facility]    Phone number: [phone]    Reason for call: Requesting an order for Home Physical Therapy thru [facility], please fax it to [phone] and or call [facility] and give a verbal order    Comments: Patient has been aching and stiff on her back  
Ok for PT?  
Verbal order given to [facility] ho  to start PT for pt   
yes  
Simple: Patient demonstrates quick and easy understanding

## 2023-10-23 ENCOUNTER — OUTPATIENT CASE MANAGEMENT (OUTPATIENT)
Dept: ADMINISTRATIVE | Facility: OTHER | Age: 68
End: 2023-10-23
Payer: MEDICARE

## 2023-10-23 NOTE — PROGRESS NOTES
"Outpatient Care Management  Plan of Care Follow Up Visit    Patient: Tasha Hawley  MRN: [id number]  Date of Service: 10/23/2023  Completed by: Michelle Box RN  Referral Date: 04/17/2023    Reason for Visit   Patient presents with    OPCM RN Follow Up Call       Brief Summary: Patient went to [facility] ED on 10/4 for shortness of breath and lethargy.  She was found to not have any CHF or PNA at this time.  She went through testing which was negative and was released home with continued [facility].  Dr. Lozano, her cardiologist is leaving [facility] effective 11/3.  Patient will need a new cardiologist.  She states she does not weigh herself at home.  Her last weight on record was 235 at the ED.  She states she does not remember the signs and symptoms of VTE.  She states she is still trying to follow a low sodium diet.  She states that she has some alternative seasonings like Mrs Cifuentes.  She did not mention she had No Salt Luciano's yet.  When asked if she remembers the signs and symptoms of CHF, she states "I have congestive heart failure?"     CM encouraged patient to weigh herself daily at home so she knows if she is gaining fluid weight and needs to notify her cardiologist.  CM then informed the patient that Dr. Lozano will be leaving [facility] on 11/3 and she needs to establish with a new cardiologist.  ARTURO explained to the patient that ARTURO has been teaching her about congestive heart failure since June of this year.  CM informed the patient she has taught her the signs and symptoms of CHF and has reviewed her low sodium diet extensively through the months.  CM again reviewed the signs and symptoms of VTE.  CM reiterated the S&S of CHF to patient including difficulty breathing or shortness of breath, especially with activity; persistent cough; swelling of feet, legs or abdomen; unexplained weight gain; fatigue; weakness; loss of appetite; dizziness or rapid heartbeat.  CM again encouraged " patient to get No Salt Luciano's and try it.    Next steps:   Follow up phone call in four weeks  Follow up if weighing herself daily and logging  Follow up on 3 lb/5 lb rule  Follow up if cooking with salt  Follow up if using alternative seasoning, got No Salt Luciano's  Follow up on low sodium diet  Follow up on S&S of CHF  Follow up on S&S of VTE  Follow up on S&S of PE

## 2023-10-24 ENCOUNTER — PATIENT MESSAGE (OUTPATIENT)
Dept: ENDOCRINOLOGY | Facility: CLINIC | Age: 68
End: 2023-10-24
Payer: MEDICARE

## 2023-10-24 NOTE — ANESTHESIA PAT ROS NOTE
10/24/2023  Tasha Hawley is a 68 y.o., female.      Pre-op Assessment          Review of Systems  Anesthesia Hx:  No problems with previous Anesthesia  History of prior surgery of interest to airway management or planning: cervical fusion. Previous anesthesia: MAC  6/23/23-EGD with MAC.  Procedure performed at an [facility]. Denies Family Hx of Anesthesia complications.   Denies Personal Hx of Anesthesia complications.   Social:  Non-Smoker, No Alcohol Use    Hematology/Oncology:     Oncology Normal    -- Anemia: Iron Deficiency Anemia  -- Transfusion: -- Transfusion Hx (no complications):  Hematology Comments: PATIENT RECEIVING IRON INFUSIONS (LAST INFUSION 7/5/23)    EENT/Dental:   Ears General/Symptom(s) Hearing Impairment: deaf- right [place]-LEFT, DOES NOT WEAR HEARING AIDS   Cardiovascular:   CAD   Angina, with exertion CHF hyperlipidemia EF 65% Functional Capacity 2 METS, USES WHEELCHAIR/ROLLATOR  Carotoid Artery Disease    Pulmonary:   COPD, moderate Shortness of breath RECENT PNEUMONIA-FEW MONTHS AGO Pulmonary Symptoms:  are dependence on supplemental O2.  Dependence on O2 is PRN with activity  Chronic Obstructive Pulmonary Disease (COPD): Emphysema Hospitalization required in the past year. Inhaler use is maintenance inhaler daily and maintenance inhaler PRN.  Obstructive Sleep Apnea (CECI), CPAP prescribed.   Renal/:   Chronic Renal Disease, CKD  Devices/Procedures/Surgery, suprapubic catheter. Kidney Function/Disease, Chronic Kidney Disease (CKD) , CKD Stage III (GFR 30-59)    Hepatic/GI:   GERD NEUROMUSCULAR DYSFUNCTION OF THE BLADDER   Musculoskeletal:   BACK SURGERIES X 12, SEVERAL LUMBAR SURGERIES AND H/O CERVICAL FUSION, CHRONIC NECK/BACK PAIN Joint Disease:  Arthritis, Osteoarthritis  Cervical Spine Disorder, S/P Cervical Fusion    Neurological:   LUMBAR RADICULOPATHY Dx of  Headaches, Migraine Headache Pain Syndrome  Chronic Pain Syndrome Osteoarthritis    Endocrine:   Hypothyroidism  Obesity / BMI > 30  Psych:   anxiety depression           Anesthesia Assessment: Preoperative EQUATION    Planned Procedure: Procedure(s) (LRB):  CYSTOSCOPY,WITH BOTULINUM TOXIN INJECTION (N/A)  CYSTOSCOPY, WITH PERIURETHRAL BULKING AGENT INJECTION (N/A)  Requested Anesthesia Type:General/MAC  Surgeon: Shireen Mayo MD  Service: Urology  Known or anticipated Date of Surgery:10/31/2023    Surgeon notes: reviewed    Electronic QUestionnaire Assessment completed via nurse interview with patient.        Triage considerations:     The patient has no apparent active cardiac condition (No unstable coronary Syndrome such as severe unstable angina or recent [<1 month] myocardial infarction, decompensated CHF, severe valvular   disease or significant arrhythmia)    Previous anesthesia records:GETA and No problems  08/02/23 baclofen pump replacement  Airway:  Mouth Opening: Normal  TM Distance: Normal  Tongue: Normal  Neck ROM: Extension Decreased, Flexion Decreased, Right Lateral Motion Decreased, Left Lateral Motion Decreased      Last PCP note: within 3 months , within [facility]   Subspecialty notes: Cardiology: General, Gastroenterology, Neurosurgery, Pulmonary, Urology    Other important co-morbidities: CHF, COPD, GERD, Hypothyroid, Morbid Obesity, CECI, and cervical fusion, arthritis       Tests already available:  Available tests,  within 1 month , within [facility] .   10/23 tsh  10/4 cxr, ekg, cmp, cbc  08/21 echo (65%)            Instructions given. (See in Nurse's note)    Optimization:  Anesthesia Preop Clinic Assessment  Indicated: not indicated    Medical Opinion Indicated           Plan:    Testing:  None Needed         Consultation: cardiology per Dr Lozano     Patient  has previously scheduled Medical Appointment:none    Navigation:           Consults scheduled.           Results will be tracked by  Preop Clinic.                 .

## 2023-10-25 ENCOUNTER — TELEPHONE (OUTPATIENT)
Dept: CARDIOLOGY | Facility: CLINIC | Age: 68
End: 2023-10-25
Payer: MEDICARE

## 2023-10-25 ENCOUNTER — CLINICAL SUPPORT (OUTPATIENT)
Dept: REHABILITATION | Facility: HOSPITAL | Age: 68
End: 2023-10-25
Attending: INTERNAL MEDICINE
Payer: MEDICARE

## 2023-10-25 DIAGNOSIS — M79.604 LEG PAIN, BILATERAL: Primary | ICD-10-CM

## 2023-10-25 DIAGNOSIS — I89.0 LYMPHEDEMA: ICD-10-CM

## 2023-10-25 DIAGNOSIS — M79.605 LEG PAIN, BILATERAL: Primary | ICD-10-CM

## 2023-10-25 PROCEDURE — 97163 PT EVAL HIGH COMPLEX 45 MIN: CPT

## 2023-10-25 PROCEDURE — 97140 MANUAL THERAPY 1/> REGIONS: CPT

## 2023-10-25 NOTE — TELEPHONE ENCOUNTER
Spoke with patient about her appointment with Dr Lozano on 10/27/23 @ 1030 am.  Patient verbalized understanding.

## 2023-10-25 NOTE — TELEPHONE ENCOUNTER
----- Message from Christine Matthews MA sent at 10/24/2023  2:32 PM CDT -----    ----- Message -----  From: Cj Castorena RN  Sent: 10/24/2023   2:18 PM CDT  To: Ncik Lozano MD; Blake Romero Staff    [facility] Provider,       Your patient indicated above requires Pre-Op Clearance/ Risk Stratification for a cystoscopy with botox  with Dr. Mayo on 10/31/23 (General anesthesia, 75 minutes).  Anesthesia review is in progress.    If a chart review is appropriate for clearance, please indicate in a note in the EMR.       If not, please advise timely, so that your clinic may schedule an appointment.  The following labs/tests have been ordered: none ordered    If further diagnostics are required please feel free to initiate.       Thank you,  Cj Castorena RN  Anesthesia [facility], [facility]

## 2023-10-25 NOTE — PROGRESS NOTES
See evaluation in POC for goals and assessment     Eval Date: 10/31/2023    Jailene Zaman, PT, DPT, CLT

## 2023-10-27 ENCOUNTER — TELEPHONE (OUTPATIENT)
Dept: INTERNAL MEDICINE | Facility: CLINIC | Age: 68
End: 2023-10-27
Payer: MEDICARE

## 2023-10-27 ENCOUNTER — TELEPHONE (OUTPATIENT)
Dept: UROLOGY | Facility: CLINIC | Age: 68
End: 2023-10-27
Payer: MEDICARE

## 2023-10-27 DIAGNOSIS — G47.01 INSOMNIA DUE TO MEDICAL CONDITION: Chronic | ICD-10-CM

## 2023-10-27 DIAGNOSIS — F41.9 ANXIETY: ICD-10-CM

## 2023-10-27 RX ORDER — TRAZODONE HYDROCHLORIDE 300 MG/1
300 TABLET ORAL NIGHTLY
Qty: 90 TABLET | Refills: 3 | Status: ON HOLD | OUTPATIENT
Start: 2023-10-27 | End: 2024-02-19

## 2023-10-27 RX ORDER — FLUOXETINE HYDROCHLORIDE 20 MG/1
60 CAPSULE ORAL DAILY
Qty: 270 CAPSULE | Refills: 3 | Status: SHIPPED | OUTPATIENT
Start: 2023-10-27 | End: 2023-12-28

## 2023-10-27 RX ORDER — QUETIAPINE FUMARATE 100 MG/1
TABLET, FILM COATED ORAL
Qty: 180 TABLET | Refills: 3 | Status: ON HOLD | OUTPATIENT
Start: 2023-10-27 | End: 2024-02-19

## 2023-10-27 NOTE — TELEPHONE ENCOUNTER
----- Message from Deb Baeza MA sent at 10/27/2023 12:39 PM CDT -----  Regarding: FW: refill; MRN:  [id number]  Contact: Pt  Good afternoon,    Please see below  a refill for the patient.  ----- Message -----  From: Juan A Madera MD  Sent: 10/27/2023   9:38 AM CDT  To: Deb Baeza MA  Subject: RE: refill; MRN:  [id number]                         Her primary care MD Dr. Hodges has been prescribing these meds since the patient has not seen me for so long. Until she sees me please ask her to request these meds from Dr. Hodges.  ----- Message -----  From: Deb Baeza MA  Sent: 10/27/2023   9:05 AM CDT  To: Juan A Madera MD  Subject: refill                                           Patient call and need the following medication refill:    1. Trazodone 300mg    2. [id number]    3. Prozac      Please send to the following pharmacy: [facility].    Regards,  Deb Baeza

## 2023-10-27 NOTE — TELEPHONE ENCOUNTER
----- Message from Christine Matthews MA sent at 10/27/2023 10:40 AM CDT -----  Regarding: cardiac med clearance  Patient did not showed for her visit with Dr Lozano today 10/27/23 for Cardiac Med Clearance.  She was rescheduled for 10/31/23.       no

## 2023-10-30 ENCOUNTER — TELEPHONE (OUTPATIENT)
Dept: INTERNAL MEDICINE | Facility: CLINIC | Age: 68
End: 2023-10-30
Payer: MEDICARE

## 2023-10-30 ENCOUNTER — DOCUMENTATION ONLY (OUTPATIENT)
Dept: REHABILITATION | Facility: HOSPITAL | Age: 68
End: 2023-10-30

## 2023-10-30 ENCOUNTER — EXTERNAL HOME HEALTH (OUTPATIENT)
Dept: HOME HEALTH SERVICES | Facility: HOSPITAL | Age: 68
End: 2023-10-30
Payer: MEDICARE

## 2023-10-30 DIAGNOSIS — I50.9 CONGESTIVE HEART FAILURE, UNSPECIFIED HF CHRONICITY, UNSPECIFIED HEART FAILURE TYPE: Primary | ICD-10-CM

## 2023-10-30 NOTE — TELEPHONE ENCOUNTER
I called the patient and she said she had it changed last week. I was able to get her rescheduled for her cardio pre-op clearance appt on tomorrow @ 9am, and I pushed her surgery to 12:15.

## 2023-10-30 NOTE — TELEPHONE ENCOUNTER
----- Message from Nathalie Llamas sent at 10/30/2023  8:56 AM CDT -----  Contact: [phone]  Patient would like to get a referral.  Referral to what specialty:  cardiology   Does the patient want the referral with a specific physician:    Is the specialist an [facility]:    Reason (be specific):  receiving injections   Does the patient already have the specialty clinic appointment scheduled:    If yes, what date is the appointment scheduled:     Is the insurance listed in Epic correct? (this is important for a referral):  yes   Advised patient that once provider approves this either a nurse or  will return their call?:   Would the patient like a call back, or a response through their [facility] portal?:   call back   Comments:

## 2023-10-30 NOTE — PROGRESS NOTES
12:20pm    Pt called to report significant B leg pain. She said she removed the bandages and saw no difference and her neighbor re bandaged her leg with home health nursing supplies. Pt reports nurse has not been in to see her this week but she has reached out to her PCP about her legs. Pt reports legs are not more red than usual. Pt reports her leg is warm but not hot. Pt hard to understand due to being at the store and talking to someone else. Pt encouraged to reach out to PCP and that therapy will reach out to PCP as well. Pt requested to call back later since she was at the store.     Jailene Zaman, PT, DPT, CLT

## 2023-10-30 NOTE — TELEPHONE ENCOUNTER
Pt didn't make her pre-op clearance from cardio. Her surgery is tomorrow and it's before her clearance appt. Would you like me to reschedule this patients surgery?

## 2023-10-31 ENCOUNTER — HOSPITAL ENCOUNTER (OUTPATIENT)
Facility: HOSPITAL | Age: 68
Discharge: HOME | End: 2023-10-31
Attending: UROLOGY | Admitting: UROLOGY
Payer: MEDICARE

## 2023-10-31 ENCOUNTER — ANESTHESIA (OUTPATIENT)
Dept: SURGERY | Facility: HOSPITAL | Age: 68
End: 2023-10-31
Payer: MEDICARE

## 2023-10-31 ENCOUNTER — DOCUMENT SCAN (OUTPATIENT)
Dept: HOME HEALTH SERVICES | Facility: HOSPITAL | Age: 68
End: 2023-10-31
Payer: MEDICARE

## 2023-10-31 ENCOUNTER — ANESTHESIA EVENT (OUTPATIENT)
Dept: SURGERY | Facility: HOSPITAL | Age: 68
End: 2023-10-31
Payer: MEDICARE

## 2023-10-31 ENCOUNTER — OFFICE VISIT (OUTPATIENT)
Dept: CARDIOLOGY | Facility: CLINIC | Age: 68
End: 2023-10-31
Payer: MEDICARE

## 2023-10-31 VITALS
HEART RATE: 83 BPM | OXYGEN SATURATION: 96 % | BODY MASS INDEX: 40.02 KG/M2 | HEIGHT: 64 IN | DIASTOLIC BLOOD PRESSURE: 74 MMHG | SYSTOLIC BLOOD PRESSURE: 114 MMHG | WEIGHT: 234.44 LBS

## 2023-10-31 VITALS
RESPIRATION RATE: 18 BRPM | SYSTOLIC BLOOD PRESSURE: 102 MMHG | OXYGEN SATURATION: 93 % | TEMPERATURE: 98 F | HEART RATE: 72 BPM | HEIGHT: 64 IN | DIASTOLIC BLOOD PRESSURE: 53 MMHG | BODY MASS INDEX: 40.24 KG/M2

## 2023-10-31 DIAGNOSIS — I25.119 CORONARY ARTERY DISEASE INVOLVING NATIVE CORONARY ARTERY OF NATIVE HEART WITH ANGINA PECTORIS: ICD-10-CM

## 2023-10-31 DIAGNOSIS — I89.0 LYMPHEDEMA OF BOTH LOWER EXTREMITIES: ICD-10-CM

## 2023-10-31 DIAGNOSIS — R00.1 BRADYCARDIA: ICD-10-CM

## 2023-10-31 DIAGNOSIS — I27.29 PULMONARY HYPERTENSION DUE TO DIASTOLIC SYSTEMIC VENTRICULAR DYSFUNCTION: ICD-10-CM

## 2023-10-31 DIAGNOSIS — I51.9 PULMONARY HYPERTENSION DUE TO DIASTOLIC SYSTEMIC VENTRICULAR DYSFUNCTION: ICD-10-CM

## 2023-10-31 DIAGNOSIS — E78.00 PURE HYPERCHOLESTEROLEMIA: ICD-10-CM

## 2023-10-31 DIAGNOSIS — Z01.818 PRE-OP EXAMINATION: Primary | ICD-10-CM

## 2023-10-31 DIAGNOSIS — I50.22 CHRONIC SYSTOLIC HEART FAILURE: ICD-10-CM

## 2023-10-31 DIAGNOSIS — I77.9 BILATERAL CAROTID ARTERY DISEASE, UNSPECIFIED TYPE: ICD-10-CM

## 2023-10-31 DIAGNOSIS — N31.9 NEUROGENIC BLADDER: Primary | ICD-10-CM

## 2023-10-31 DIAGNOSIS — I70.0 THORACIC AORTA ATHEROSCLEROSIS: ICD-10-CM

## 2023-10-31 DIAGNOSIS — I50.9 CONGESTIVE HEART FAILURE, UNSPECIFIED HF CHRONICITY, UNSPECIFIED HEART FAILURE TYPE: ICD-10-CM

## 2023-10-31 PROCEDURE — 3288F PR FALLS RISK ASSESSMENT DOCUMENTED: ICD-10-PCS | Mod: CPTII,S$GLB,, | Performed by: INTERNAL MEDICINE

## 2023-10-31 PROCEDURE — 3078F PR MOST RECENT DIASTOLIC BLOOD PRESSURE < 80 MM HG: ICD-10-PCS | Mod: CPTII,S$GLB,, | Performed by: INTERNAL MEDICINE

## 2023-10-31 PROCEDURE — 3044F HG A1C LEVEL LT 7.0%: CPT | Mod: CPTII,S$GLB,, | Performed by: INTERNAL MEDICINE

## 2023-10-31 PROCEDURE — 1126F PR PAIN SEVERITY QUANTIFIED, NO PAIN PRESENT: ICD-10-PCS | Mod: CPTII,S$GLB,, | Performed by: INTERNAL MEDICINE

## 2023-10-31 PROCEDURE — 3044F PR MOST RECENT HEMOGLOBIN A1C LEVEL <7.0%: ICD-10-PCS | Mod: CPTII,S$GLB,, | Performed by: INTERNAL MEDICINE

## 2023-10-31 PROCEDURE — 1160F RVW MEDS BY RX/DR IN RCRD: CPT | Mod: CPTII,S$GLB,, | Performed by: INTERNAL MEDICINE

## 2023-10-31 PROCEDURE — 87086 URINE CULTURE/COLONY COUNT: CPT

## 2023-10-31 PROCEDURE — 1159F MED LIST DOCD IN RCRD: CPT | Mod: CPTII,S$GLB,, | Performed by: INTERNAL MEDICINE

## 2023-10-31 PROCEDURE — 3008F BODY MASS INDEX DOCD: CPT | Mod: CPTII,S$GLB,, | Performed by: INTERNAL MEDICINE

## 2023-10-31 PROCEDURE — 3288F FALL RISK ASSESSMENT DOCD: CPT | Mod: CPTII,S$GLB,, | Performed by: INTERNAL MEDICINE

## 2023-10-31 PROCEDURE — 3074F PR MOST RECENT SYSTOLIC BLOOD PRESSURE < 130 MM HG: ICD-10-PCS | Mod: CPTII,S$GLB,, | Performed by: INTERNAL MEDICINE

## 2023-10-31 PROCEDURE — 1160F PR REVIEW ALL MEDS BY PRESCRIBER/CLIN PHARMACIST DOCUMENTED: ICD-10-PCS | Mod: CPTII,S$GLB,, | Performed by: INTERNAL MEDICINE

## 2023-10-31 PROCEDURE — 99214 OFFICE O/P EST MOD 30 MIN: CPT | Mod: S$GLB,,, | Performed by: INTERNAL MEDICINE

## 2023-10-31 PROCEDURE — 1101F PT FALLS ASSESS-DOCD LE1/YR: CPT | Mod: CPTII,S$GLB,, | Performed by: INTERNAL MEDICINE

## 2023-10-31 PROCEDURE — 99999 PR PBB SHADOW E&M-EST. PATIENT-LVL V: CPT | Mod: PBBFAC,,, | Performed by: INTERNAL MEDICINE

## 2023-10-31 PROCEDURE — 3074F SYST BP LT 130 MM HG: CPT | Mod: CPTII,S$GLB,, | Performed by: INTERNAL MEDICINE

## 2023-10-31 PROCEDURE — 1126F AMNT PAIN NOTED NONE PRSNT: CPT | Mod: CPTII,S$GLB,, | Performed by: INTERNAL MEDICINE

## 2023-10-31 PROCEDURE — 3008F PR BODY MASS INDEX (BMI) DOCUMENTED: ICD-10-PCS | Mod: CPTII,S$GLB,, | Performed by: INTERNAL MEDICINE

## 2023-10-31 PROCEDURE — 99999 PR PBB SHADOW E&M-EST. PATIENT-LVL V: ICD-10-PCS | Mod: PBBFAC,,, | Performed by: INTERNAL MEDICINE

## 2023-10-31 PROCEDURE — 3078F DIAST BP <80 MM HG: CPT | Mod: CPTII,S$GLB,, | Performed by: INTERNAL MEDICINE

## 2023-10-31 PROCEDURE — 1159F PR MEDICATION LIST DOCUMENTED IN MEDICAL RECORD: ICD-10-PCS | Mod: CPTII,S$GLB,, | Performed by: INTERNAL MEDICINE

## 2023-10-31 PROCEDURE — 99214 PR OFFICE/OUTPT VISIT, EST, LEVL IV, 30-39 MIN: ICD-10-PCS | Mod: S$GLB,,, | Performed by: INTERNAL MEDICINE

## 2023-10-31 PROCEDURE — 1101F PR PT FALLS ASSESS DOC 0-1 FALLS W/OUT INJ PAST YR: ICD-10-PCS | Mod: CPTII,S$GLB,, | Performed by: INTERNAL MEDICINE

## 2023-10-31 RX ORDER — CIPROFLOXACIN 500 MG/1
500 TABLET ORAL 2 TIMES DAILY
Qty: 28 TABLET | Refills: 0 | Status: ON HOLD | OUTPATIENT
Start: 2023-10-31 | End: 2023-11-14 | Stop reason: SDUPTHER

## 2023-10-31 RX ORDER — SODIUM CHLORIDE 9 MG/ML
INJECTION, SOLUTION INTRAVENOUS CONTINUOUS
Status: DISCONTINUED | OUTPATIENT
Start: 2023-10-31 | End: 2024-01-05

## 2023-10-31 NOTE — H&P (VIEW-ONLY)
Pre-Op H&P    CHIEF COMPLAINT:    Mrs. Hawley is a 67 y.o. female presenting for an urgent appointment for suprapubic pressure.     PRESENTING ILLNESS:    Tasha Hawley is a 67 y.o. female who is presents with a history of neurogenic bladder with incomplete emptying managed with a suprapubic tube.  She was supposed to have follow up in the department but she cancelled the appointment.  She states she has suprapubic pressure.  She is due to have the suprapubic tube changed next week.  No fevers, chills or malaise.  She does spend a good bit of time in bed per her .  When this happens she diureses as she has chronic lower extremity edema. She also has some incontinence per urethra.  Some days are better than others.     Suprapubic tube was placed in 2016  Cystoscopy Botox injection of the bladder and bladder biopsy 1/26/2017  Cystoscopy Botox injection and injection of Macroplastique 3/20/2018  Autologous fascial sling and cystoscopy, cystolitholapaxy 9/3/2019  Cystoscopy Botox injection of the bladder 10/31/2019  Cystoscopy Botox injection of the bladder 6/23/2020  Cystoscopy Botox injection of the bladder 12/15/2020  Cystoscopy Botox injection of the bladder 7/13/2021  Cystoscopy Botox injection of the bladder 11/16/2021  Cystoscopy Botox injection of the bladder and injection of Macroplastique 7/19/2022  Cystoscopy Botox injection of the bladder 12/13/2022      REVIEW OF SYSTEMS:    Review of Systems   Constitutional: Negative.    HENT: Negative.     Eyes: Negative.    Respiratory: Negative.     Cardiovascular:  Positive for leg swelling.   Gastrointestinal: Negative.    Genitourinary:         Periodic suprapubic tenderness   Musculoskeletal: Negative.    Skin: Negative.    Neurological: Negative.    Endo/Heme/Allergies: Negative.    Psychiatric/Behavioral: Negative.         PATIENT HISTORY:    Past Medical History:   Diagnosis Date    Anticoagulant long-term use     Anxiety     Arthritis      Bilateral lower extremity edema     severe chronic    Carotid artery occlusion     Cataract     CHF (congestive heart failure)     Coronary artery disease     subtotalled LAD with collateral    Depression     Fever blister     Hard of hearing     Hypokalemia 1/9/2023    Hyponatremia 2/4/2022    Hypothyroid     Iron deficiency anemia     Lumbar radiculopathy     with chronic pain    Ocular migraine     Renal disorder     Sleep apnea     cpap       Past Surgical History:   Procedure Laterality Date    ADENOIDECTOMY      BACK SURGERY      x 12    CARDIAC CATHETERIZATION  2016    subtotalled LAD with right to left collaterals    CATARACT EXTRACTION W/  INTRAOCULAR LENS IMPLANT Left     Dr Coleman     CYSTOSCOPIC LITHOLAPAXY N/A 6/27/2019    Procedure: CYSTOLITHOLAPAXY;  Surgeon: Shireen Mayo MD;  Location: [facility] OR 2ND FLR;  Service: Urology;  Laterality: N/A;    CYSTOSCOPIC LITHOLAPAXY N/A 9/3/2019    Procedure: CYSTOLITHOLAPAXY;  Surgeon: Shireen Mayo MD;  Location: [facility] OR 2ND FLR;  Service: Urology;  Laterality: N/A;    CYSTOSCOPY N/A 7/13/2021    Procedure: CYSTOSCOPY;  Surgeon: Shireen Mayo MD;  Location: [facility] OR 1ST FLR;  Service: Urology;  Laterality: N/A;    CYSTOSCOPY  11/16/2021    Procedure: CYSTOSCOPY;  Surgeon: Shireen Mayo MD;  [facility];  Service: Urology;;    CYSTOSCOPY  7/19/2022    Procedure: CYSTOSCOPY;  Surgeon: Shireen Mayo MD;  [facility];  Service: Urology;;    CYSTOSCOPY WITH INJECTION OF PERIURETHRAL BULKING AGENT  7/19/2022    Procedure: CYSTOSCOPY, WITH PERIURETHRAL BULKING AGENT INJECTION-MACROPLASTIQUE;  Surgeon: Shireen Mayo MD;  Location: [facility] OR 1ST FLR;  Service: Urology;;    CYSTOSCOPY,WITH BOTULINUM TOXIN INJECTION N/A 12/13/2022    Procedure: CYSTOSCOPY,WITH BOTULINUM TOXIN INJECTION;  Surgeon: Shireen Mayo MD;  Location: [facility] OR 1ST FLR;  Service: Urology;  Laterality: N/A;  300 U    ESOPHAGOGASTRODUODENOSCOPY N/A  5/23/2018    Procedure: ESOPHAGOGASTRODUODENOSCOPY (EGD);  Surgeon: Prince Vance MD;  Location: [facility] (4TH FLR);  Service: Endoscopy;  Laterality: N/A;  r/s 'd per Dr. Vance due to family emergency- ER    HYSTERECTOMY  1975    endometriosis    INJECTION OF BOTULINUM TOXIN TYPE A  7/13/2021    Procedure: INJECTION, BOTULINUM TOXIN, 200units;  Surgeon: Shireen Mayo MD;  Location: [facility] OR 1ST FLR;  Service: Urology;;    INJECTION OF BOTULINUM TOXIN TYPE A  11/16/2021    Procedure: INJECTION, BOTULINUM TOXIN, 200units;  Surgeon: Shireen Mayo MD;  [facility];  Service: Urology;;    INJECTION OF BOTULINUM TOXIN TYPE A  7/19/2022    Procedure: INJECTION, BOTULINUM TOXIN, 300 units ;  Surgeon: Shireen Mayo MD;  [facility];  Service: Urology;;    INSERTION, SUPRAPUBIC CATHETER N/A 12/13/2022    Procedure: INSERTION, SUPRAPUBIC CATHETER;  Surgeon: Shireen Mayo MD;  [facility];  Service: Urology;  Laterality: N/A;  exchange    pain pump placement      SQ Dilaudid Pump managed by Dr. Hillman, Pain Management    REMOVAL OF BONE SPUR OF FOOT Bilateral 9/16/2022    Procedure: EXCISION ARTHRITIC BONE, BILATERAL FOOT;  Surgeon: NICOLA Mcgrath;  Location: [facility] OR;  Service: Podiatry;  Laterality: Bilateral;    REPLACEMENT OF CATHETER N/A 10/31/2019    Procedure: REPLACEMENT, CATHETER-SUPRAPUBIC;  Surgeon: Shireen Mayo MD;  [facility];  Service: Urology;  Laterality: N/A;    SPINAL CORD STIMULATOR REMOVAL      before [facility]    SPINE SURGERY  5-13-13    CERVICAL FUSION    TONSILLECTOMY         Family History   Problem Relation Age of Onset    Cancer Mother 55        breast    Cancer Father         esophagus,had laryngectomy    Esophageal cancer Father     Parkinsonism Maternal Grandmother     Tremor Maternal Grandmother     No Known Problems Brother     No Known Problems Brother     Heart disease Maternal Uncle     Colon cancer  Maternal Uncle         Less than 60    No Known Problems Sister     No Known Problems Maternal Aunt     Cirrhosis Paternal Aunt         ETOH    Liver disease Paternal Aunt         ETOH    Liver disease Paternal Uncle         ETOH    Cirrhosis Paternal Uncle         ETOH     Social History     Socioeconomic History    Marital status:    Tobacco Use    Smoking status: Never    Smokeless tobacco: Never   Substance and Sexual Activity    Alcohol use: Never    Drug use: No    Sexual activity: Never     Partners: Male     Social Determinants of Health     Financial Resource Strain: Low Risk     Difficulty of Paying Living Expenses: Not hard at all   Food Insecurity: No Food Insecurity    Worried About Running Out of Food in the Last Year: Never true    Ran Out of Food in the Last Year: Never true   Transportation Needs: Unmet Transportation Needs    Lack of Transportation (Medical): Yes    Lack of Transportation (Non-Medical): Yes   Physical Activity: Inactive    Days of Exercise per Week: 0 days    Minutes of Exercise per Session: 0 min   Stress: Stress Concern Present    Feeling of Stress : Rather much   Social Connections: Moderately Integrated    Frequency of Communication with Friends and Family: More than three times a week    Frequency of Social Gatherings with Friends and Family: More than three times a week    Attends Adventism Services: Never    Active Member of Clubs or Organizations: Yes    Attends Club or Organization Meetings: Never    Marital Status:    Housing Stability: Low Risk     Unable to Pay for Housing in the Last Year: No    Number of Places Lived in the Last Year: 1    Unstable Housing in the Last Year: No       Allergies:  (d)-limonene flavor; Bactrim [sulfamethoxazole-trimethoprim]; Benadryl [diphenhydramine hcl]; Fentanyl; Imitrex [sumatriptan succinate]; Percocet [oxycodone-acetaminophen]; Topamax [topiramate]; Vancomycin; Butorphanol tartrate; Darvocet a500 [propoxyphene  n-acetaminophen]; Evening primrose (oenothera biennis); White petrolatum-zinc; Zinc oxide-white petrolatum; Latex, natural rubber; and Phenytoin    Medications:  Outpatient Encounter Medications as of 2/10/2023   Medication Sig Dispense Refill    aspirin (ECOTRIN) 81 MG EC tablet Take 1 tablet (81 mg total) by mouth once daily. 30 tablet 0    atorvastatin (LIPITOR) 80 MG tablet TAKE ONE TABLET BY MOUTH EVERY DAY 90 tablet 3    butalbital-acetaminophen-caffeine -40 mg (FIORICET, ESGIC) -40 mg per tablet Take 1 tablet by mouth daily as needed. 15 tablet 0    cephALEXin (KEFLEX) 500 MG capsule Take 1 capsule (500 mg total) by mouth 4 (four) times daily. End 1/25/23      ferrous sulfate 325 (65 FE) MG EC tablet Take 1 tablet (325 mg total) by mouth every other day.      fludrocortisone (FLORINEF) 0.1 mg Tab Take 1 tablet (100 mcg total) by mouth once daily. 30 tablet 0    FLUoxetine 20 MG capsule Take 3 capsules (60 mg total) by mouth once daily. 270 capsule 3    furosemide (LASIX) 40 MG tablet Take 1 tablet (40 mg total) by mouth once daily. 90 tablet 3    HYDROcodone-acetaminophen (NORCO) 5-325 mg per tablet Take 1 tablet by mouth every 8 (eight) hours as needed (breakthrough). 10 tablet 0    hydrocortisone (CORTEF) 10 MG Tab Take 1 tablet (10 mg total) by mouth once daily. 60 tablet 3    hydrocortisone (CORTEF) 5 MG Tab Take 1 tablet (5 mg total) by mouth every evening. 10 tablet 0    intrathecal pain pump compound Hydromorphone (7.5 mg/mL) infusion at 3.6799 mg/day (0.1533 mg/hr) out of a total reservoir volume of 37.3 mL  Pump filled every 2 months      levothyroxine (SYNTHROID) 137 MCG Tab tablet Take 1 tablet (137 mcg total) by mouth before breakfast. 90 tablet 3    LIDOcaine (LIDODERM) 5 % Place 1 patch onto the skin once daily. Remove & Discard patch within 12 hours or as directed by MD  0    pantoprazole (PROTONIX) 40 MG tablet Take 1 tablet (40 mg total) by mouth once daily. 90 tablet 3     potassium chloride SA (K-DUR,KLOR-CON) 20 MEQ tablet Take 1 tablet (20 mEq total) by mouth 2 (two) times daily. 180 tablet 3    promethazine (PHENERGAN) 25 MG tablet Take 25 mg by mouth every 6 (six) hours as needed for Nausea.      QUEtiapine (SEROQUEL) 100 MG Tab TAKE 2 TABLETS (200 MG) BY MOUTH NIGHTLY 180 tablet 3    senna (SENNA LAX) 8.6 mg tablet Take 2 tablets by mouth 2 (two) times daily.      traZODone (DESYREL) 300 MG tablet Take 1 tablet (300 mg total) by mouth every evening. 90 tablet 0    nitroGLYCERIN (NITROSTAT) 0.4 MG SL tablet Place 1 tablet (0.4 mg total) under the tongue every 5 (five) minutes as needed for Chest pain. 25 tablet 3     No facility-administered encounter medications on file as of 2/10/2023.         PHYSICAL EXAMINATION:    The patient generally appears in good health, is appropriately interactive, and is in no apparent distress.    Skin: No lesions.    Mental: Cooperative with normal affect.    Neuro: Grossly intact.    HEENT: Normal. No evidence of lymphadenopathy.    Chest:  normal inspiratory effort.    Abdomen: Soft, non-tender. No masses or organomegaly. Bladder is not palpable. No evidence of flank discomfort. No evidence of inguinal hernia.  The suprapubic tube site is CDI.      Extremities: No clubbing, cyanosis, or edema      LABS:    Lab Results   Component Value Date    BUN 9 01/18/2023    CREATININE 0.9 01/18/2023     Renal ultrasound on 4/16/2022 showed no stones, masses or hydronephrosis    IMPRESSION:    Incomplete bladder emptying from neurogenic bladder  Suprapubic tube    PLAN:    Patient here for botox and periurethral bulking. UA nitrite positive. Exchanging SPT and sending for culture. Sending home on cipro empirically. Will reschedule.    WILLIAMS Sutherland MD  Urology PGY-2    Patient seen in [facility].  She was not treated with appropriate antibiotics when she had the catheter changed in the ER last week. Culture sent from a new catheter and will await C & S to  guide therapy.

## 2023-10-31 NOTE — DISCHARGE SUMMARY
[facility]  Discharge Note  Short Stay    Admit Date: 10/31/2023    Discharge Date and Time: 10/31/2023 1:04 PM      Attending Physician: Shireen Mayo MD     Discharge Provider: LEONARD LOCKETT MD    Diagnoses:  There are no hospital problems to display for this patient.      Discharged Condition: stable    Hospital Course: Patient was admitted for intravesical botox injection and periurethral bulking. Urine dipped nitrite positive, trace leukocytes, blood. Case was cancelled. Urine sent for culture. She was discharged home in good condition on the same day.       Final Diagnoses: Same as principal problem.    Disposition: Home or Self Care    Follow up/Patient Instructions:    Medications:  Reconciled Home Medications:   Current Discharge Medication List        CONTINUE these medications which have NOT CHANGED    Details   aspirin (ECOTRIN) 81 MG EC tablet Take 1 tablet (81 mg total) by mouth once daily.  Qty: 90 tablet, Refills: 3    Associated Diagnoses: Chronic pain syndrome      atorvastatin (LIPITOR) 80 MG tablet Take 1 tablet (80 mg total) by mouth once daily.  Qty: 90 tablet, Refills: 3    Associated Diagnoses: Mixed hyperlipidemia      butalbital-acetaminophen-caffeine -40 mg (FIORICET, ESGIC) -40 mg per tablet Take 1 tablet by mouth daily as needed.      fludrocortisone (FLORINEF) 0.1 mg Tab Take a half-tablet (50 mcg) by mouth once daily  Qty: 15 tablet, Refills: 5      FLUoxetine 20 MG capsule Take 3 capsules (60 mg total) by mouth once daily.  Qty: 270 capsule, Refills: 3    Associated Diagnoses: Anxiety      fluticasone propionate (FLONASE) 50 mcg/actuation nasal spray 2 sprays (100 mcg total) by Each Nostril route once daily.  Qty: 16 g, Refills: 0      furosemide (LASIX) 40 MG tablet Take 2 tablets (80 mg total) by mouth 2 (two) times a day.  Qty: 360 tablet, Refills: 3      HYDROcodone-acetaminophen (NORCO)  mg per tablet Take 1 tablet by mouth every 6 (six) hours as  needed.  Qty: 30 tablet, Refills: 0    Comments: Quantity prescribed more than 7 day supply? No  Associated Diagnoses: Pain in right foot      hydrOXYzine (ATARAX) 50 MG tablet Take 1 tablet (50 mg total) by mouth every 4 (four) hours as needed for Anxiety.  Qty: 30 tablet, Refills: 0      intrathecal pain pump compound Hydromorphone (7.5 mg/mL) infusion at 8.59 mg/day (0.3578 mg/hr) out of a total reservoir volume of 40 mL  Pump filled monthly      levothyroxine (SYNTHROID) 150 MCG tablet Take 1 tablet (150 mcg total) by mouth before breakfast.  Qty: 90 tablet, Refills: 3    Associated Diagnoses: Hypothyroidism (acquired)      amoxicillin-clavulanate 875-125mg (AUGMENTIN) 875-125 mg per tablet Take 1 tablet by mouth 2 (two) times daily.  Qty: 14 tablet, Refills: 0      clindamycin (CLEOCIN) 300 MG capsule Take 1 capsule (300 mg total) by mouth every 8 (eight) hours.  Qty: 30 capsule, Refills: 0      cyanocobalamin, vitamin B-12, 5,000 mcg Subl Place 1 tablet under the tongue once daily.      darifenacin (ENABLEX) 7.5 MG Tb24 Take 7.5 mg by mouth.      diclofenac sodium (VOLTAREN ARTHRITIS PAIN) 1 % Gel Apply 4 g topically 4 (four) times daily.  Qty: 150 g, Refills: 5    Associated Diagnoses: Chronic pain syndrome; Arthritis      docusate sodium (COLACE) 100 MG capsule Take 200 mg by mouth every evening.      ferrous gluconate (FERGON) 324 MG tablet Take 1 tablet (324 mg total) by mouth daily with breakfast.  Qty: 90 tablet, Refills: 1    Associated Diagnoses: Iron deficiency anemia, unspecified iron deficiency anemia type      hydrocortisone (CORTEF) 10 MG Tab Take 1 tablet (10 mg total) by mouth every evening.  Qty: 30 tablet, Refills: 5      ketorolac (TORADOL) 10 mg tablet Take 10 mg by mouth daily as needed.      LIDOcaine (LIDODERM) 5 % Place 1 patch onto the skin once daily. Remove & Discard patch within 12 hours or as directed by MD  Refills: 0      liothyronine (CYTOMEL) 5 MCG Tab Take 1 tablet (5 mcg  total) by mouth once daily.  Qty: 30 tablet, Refills: 11    Associated Diagnoses: Hypothyroidism (acquired)      nitroGLYCERIN (NITROSTAT) 0.4 MG SL tablet Place 0.4 mg under the tongue every 5 (five) minutes as needed for Chest pain.      nystatin (MYCOSTATIN) powder Apply topically 4 (four) times daily.  Qty: 60 g, Refills: 0      ondansetron (ZOFRAN-ODT) 4 MG TbDL Take 4 mg by mouth every 4 to 6 hours as needed.      pantoprazole (PROTONIX) 40 MG tablet Take 1 tablet (40 mg total) by mouth once daily.  Qty: 90 tablet, Refills: 3    Associated Diagnoses: Gastroesophageal reflux disease, unspecified whether esophagitis present      potassium chloride (MICRO-K) 10 MEQ CpSR Take 2 capsules (20 mEq total) by mouth once daily.  Qty: 60 capsule, Refills: 11      promethazine (PHENERGAN) 25 MG tablet Take 25 mg by mouth every 6 (six) hours as needed for Nausea.      QUEtiapine (SEROQUEL) 100 MG Tab TAKE 2 TABLETS (200 MG) BY MOUTH NIGHTLY  Qty: 180 tablet, Refills: 3    Associated Diagnoses: Insomnia due to medical condition      senna (SENNA LAX) 8.6 mg tablet Take 2 tablets by mouth 2 (two) times daily.      tiotropium bromide (SPIRIVA RESPIMAT) 2.5 mcg/actuation inhaler Inhale 2 puffs into the lungs once daily. Controller  Qty: 4 g, Refills: 1      traMADoL (ULTRAM) 50 mg tablet Take 1 tablet (50 mg total) by mouth every 4 (four) hours as needed for Pain.  Qty: 30 tablet, Refills: 0    Comments: Quantity prescribed more than 7 day supply? No  Associated Diagnoses: Pain in right foot      traZODone (DESYREL) 300 MG tablet Take 1 tablet (300 mg total) by mouth every evening.  Qty: 90 tablet, Refills: 3    Associated Diagnoses: Insomnia due to medical condition           No discharge procedures on file.    As above.

## 2023-10-31 NOTE — PROGRESS NOTES
Subjective:    Patient ID:  Tasha Hawley is a 68 y.o. female who presents for follow-up of Pre-op Exam      Seen by Dr Ceballos in the past. Per his note 8/2022:       She is here for clearance related to bone spurs and podiatry issues of both lower extremities.  Her feet are wrapped.  The surgery has not been scheduled yet.  She has no history of peripheral arterial disease.  She does not ambulate very much.  She is in a wheelchair.  She is with her  today.    She was hospitalized at [place] for diastolic heart failure in leg swelling.  She is a cigarette smoker.  She had an Electrocardiogram in July which looked normal.  Her history includes previous cardiac catheterization which she does not remember.  2016 catheterization documents subtotaled left anterior descending artery with right-to-left collaterals.  She had a nuclear stress test June 2021 showing normal ejection fraction and normal perfusion study.  She has low blood pressure.  She is on furosemide for her leg swelling.  Her echo July 2022 confirms normal ejection fraction without valvular disease of significance.  She is on atorvastatin aspirin.  She denies chest pain.    8/11/2023:  Has worsening bilateral LE edema and erythema. Has what looks like venous stasis ulcers. Has CP/JONES/orthopnea. Compliant with meds. Drinks a lot of water.     8/29/2023:  Since last visit had normal nuclear stress test, LE venous doppler without DVT or significant congestion (difficult study), and 2DE with normal EF and elevated PASP. Has lost a few pounds since last visit, however, has not been taking lasix as recommended. Bilateral LE edema improved, however, she remains very SOB. Has HH nurse that wraps legs. Continues to drink a lot of fluids.     10/31/2023:  Here for skip-operative cardiac eval prior to injection for bladder. Main complaints are of JONES and bilateral LE edema from lymphedema. Limited in activity from lymphedema but  able to accomplish ADL's (~4 METs). Compliant with meds.       Review of Systems   Constitutional: Negative for chills, decreased appetite, diaphoresis, fever, malaise/fatigue, night sweats, weight gain and weight loss.   HENT:  Positive for hearing loss. Negative for congestion, ear discharge, ear pain, hoarse voice, nosebleeds, odynophagia, sore throat, stridor and tinnitus.    Eyes:  Negative for blurred vision, discharge, double vision, pain, photophobia, redness, vision loss in left eye, vision loss in right eye, visual disturbance and visual halos.   Cardiovascular:  Positive for leg swelling. Negative for chest pain, claudication, cyanosis, dyspnea on exertion, irregular heartbeat, near-syncope, orthopnea, palpitations, paroxysmal nocturnal dyspnea and syncope.   Respiratory:  Positive for cough and shortness of breath. Negative for hemoptysis, sleep disturbances due to breathing, snoring, sputum production and wheezing.    Endocrine: Negative for cold intolerance, heat intolerance, polydipsia, polyphagia and polyuria.   Hematologic/Lymphatic: Negative for adenopathy and bleeding problem. Does not bruise/bleed easily.   Skin:  Positive for poor wound healing. Negative for color change, dry skin, flushing, itching, nail changes, rash, skin cancer, suspicious lesions and unusual hair distribution.   Musculoskeletal:  Positive for joint pain. Negative for arthritis, back pain, falls, gout, joint swelling, muscle cramps, muscle weakness, myalgias, neck pain and stiffness.   Gastrointestinal:  Negative for bloating, abdominal pain, anorexia, change in bowel habit, bowel incontinence, constipation, diarrhea, dysphagia, excessive appetite, flatus, heartburn, hematemesis, hematochezia, hemorrhoids, jaundice, melena, nausea and vomiting.   Genitourinary:  Negative for bladder incontinence, decreased libido, dysuria, flank pain, frequency, genital sores, hematuria, hesitancy, incomplete emptying, nocturia and urgency.  "  Neurological:  Negative for aphonia, brief paralysis, difficulty with concentration, disturbances in coordination, excessive daytime sleepiness, dizziness, focal weakness, headaches, light-headedness, loss of balance, numbness, paresthesias, seizures, sensory change, tremors, vertigo and weakness.   Psychiatric/Behavioral:  Negative for altered mental status, depression, hallucinations, memory loss, substance abuse, suicidal ideas and thoughts of violence. The patient does not have insomnia and is not nervous/anxious.    Allergic/Immunologic: Negative for hives and persistent infections.        Objective:       Vitals:    10/31/23 0918   BP: 114/74   BP Location: Left arm   Patient Position: Sitting   BP Method: Large (Automatic)   Pulse: 83   SpO2: 96%   Weight: 106.4 kg (234 lb 7.4 oz)   Height: 5' 4" (1.626 m)    Physical Exam  Constitutional:       General: She is not in acute distress.     Appearance: She is well-developed. She is not diaphoretic.   HENT:      Head: Normocephalic and atraumatic.      Nose: Nose normal.   Eyes:      General: No scleral icterus.        Right eye: No discharge.      Conjunctiva/sclera: Conjunctivae normal.      Pupils: Pupils are equal, round, and reactive to light.   Neck:      Thyroid: No thyromegaly.      Vascular: No JVD.      Trachea: No tracheal deviation.   Cardiovascular:      Rate and Rhythm: Normal rate and regular rhythm.      Pulses:           Carotid pulses are 2+ on the right side and 2+ on the left side.       Radial pulses are 2+ on the right side and 2+ on the left side.        Dorsalis pedis pulses are 2+ on the right side and 2+ on the left side.        Posterior tibial pulses are 2+ on the right side and 2+ on the left side.      Heart sounds: Normal heart sounds. No murmur heard.     No friction rub. No gallop.   Pulmonary:      Effort: Pulmonary effort is normal. No respiratory distress.      Breath sounds: No stridor. Examination of the right-upper field " reveals decreased breath sounds. Examination of the left-upper field reveals decreased breath sounds. Examination of the right-middle field reveals decreased breath sounds and rhonchi. Examination of the left-middle field reveals decreased breath sounds and rhonchi. Examination of the right-lower field reveals decreased breath sounds. Examination of the left-lower field reveals decreased breath sounds. Decreased breath sounds and rhonchi present. No wheezing or rales.   Chest:      Chest wall: No tenderness.   Abdominal:      General: Bowel sounds are normal. There is no distension.      Palpations: Abdomen is soft. There is no mass.      Tenderness: There is no abdominal tenderness. There is no guarding or rebound.   Musculoskeletal:         General: No tenderness. Normal range of motion.      Cervical back: Normal range of motion and neck supple.      Right lower leg: Edema present.      Left lower leg: Edema present.   Lymphadenopathy:      Cervical: No cervical adenopathy.   Skin:     General: Skin is warm and dry.      Coloration: Skin is not pale.      Findings: No erythema or rash.   Neurological:      Mental Status: She is alert and oriented to person, place, and time.      Cranial Nerves: No cranial nerve deficit.      Coordination: Coordination normal.   Psychiatric:         Behavior: Behavior normal.         Thought Content: Thought content normal.         Judgment: Judgment normal.           Assessment:       1. Pre-op examination    2. Coronary artery disease involving native coronary artery of native heart with angina pectoris    3. Thoracic aorta atherosclerosis    4. Pure hypercholesterolemia    5. Pulmonary hypertension due to diastolic systemic ventricular dysfunction    6. Chronic systolic heart failure    7. Bradycardia    8. Bilateral carotid artery disease, unspecified type    9. Lymphedema of both lower extremities      69 y/o pt with hx and presentation as above. Bilateral LE edema improved,  and will be following in lymphedema clinic. Looks like DD is not the only contributor to her SOB, and needs continued management of pulmonary issues. No absolute contraindication for upcoing procedure. Has multiple comorbidities and likely symptoms multifactorial. Needs to lose weight. Discussed the etiology, evaluation, and management of CHF, obesity, lymphedema, HTN, HLD, PHTN. Discussed the importance of med compliance, heart healthy diet, and regular exercise.        Plan:       -The pt is at an acceptable risk from a cardiac perspective for upcoming non cardiac procedure  -Continue current medical management  -f/u in 6 months

## 2023-10-31 NOTE — H&P
Pre-Op H&P    CHIEF COMPLAINT:    Mrs. Hawley is a 67 y.o. female presenting for an urgent appointment for suprapubic pressure.     PRESENTING ILLNESS:    Tasha Hawley is a 67 y.o. female who is presents with a history of neurogenic bladder with incomplete emptying managed with a suprapubic tube.  She was supposed to have follow up in the department but she cancelled the appointment.  She states she has suprapubic pressure.  She is due to have the suprapubic tube changed next week.  No fevers, chills or malaise.  She does spend a good bit of time in bed per her .  When this happens she diureses as she has chronic lower extremity edema. She also has some incontinence per urethra.  Some days are better than others.     Suprapubic tube was placed in 2016  Cystoscopy Botox injection of the bladder and bladder biopsy 1/26/2017  Cystoscopy Botox injection and injection of Macroplastique 3/20/2018  Autologous fascial sling and cystoscopy, cystolitholapaxy 9/3/2019  Cystoscopy Botox injection of the bladder 10/31/2019  Cystoscopy Botox injection of the bladder 6/23/2020  Cystoscopy Botox injection of the bladder 12/15/2020  Cystoscopy Botox injection of the bladder 7/13/2021  Cystoscopy Botox injection of the bladder 11/16/2021  Cystoscopy Botox injection of the bladder and injection of Macroplastique 7/19/2022  Cystoscopy Botox injection of the bladder 12/13/2022      REVIEW OF SYSTEMS:    Review of Systems   Constitutional: Negative.    HENT: Negative.     Eyes: Negative.    Respiratory: Negative.     Cardiovascular:  Positive for leg swelling.   Gastrointestinal: Negative.    Genitourinary:         Periodic suprapubic tenderness   Musculoskeletal: Negative.    Skin: Negative.    Neurological: Negative.    Endo/Heme/Allergies: Negative.    Psychiatric/Behavioral: Negative.         PATIENT HISTORY:    Past Medical History:   Diagnosis Date    Anticoagulant long-term use     Anxiety     Arthritis      Bilateral lower extremity edema     severe chronic    Carotid artery occlusion     Cataract     CHF (congestive heart failure)     Coronary artery disease     subtotalled LAD with collateral    Depression     Fever blister     Hard of hearing     Hypokalemia 1/9/2023    Hyponatremia 2/4/2022    Hypothyroid     Iron deficiency anemia     Lumbar radiculopathy     with chronic pain    Ocular migraine     Renal disorder     Sleep apnea     cpap       Past Surgical History:   Procedure Laterality Date    ADENOIDECTOMY      BACK SURGERY      x 12    CARDIAC CATHETERIZATION  2016    subtotalled LAD with right to left collaterals    CATARACT EXTRACTION W/  INTRAOCULAR LENS IMPLANT Left     Dr Coleman     CYSTOSCOPIC LITHOLAPAXY N/A 6/27/2019    Procedure: CYSTOLITHOLAPAXY;  Surgeon: Shireen Mayo MD;  Location: [facility] OR 2ND FLR;  Service: Urology;  Laterality: N/A;    CYSTOSCOPIC LITHOLAPAXY N/A 9/3/2019    Procedure: CYSTOLITHOLAPAXY;  Surgeon: Shireen Mayo MD;  Location: [facility] OR 2ND FLR;  Service: Urology;  Laterality: N/A;    CYSTOSCOPY N/A 7/13/2021    Procedure: CYSTOSCOPY;  Surgeon: Shireen Mayo MD;  Location: [facility] OR 1ST FLR;  Service: Urology;  Laterality: N/A;    CYSTOSCOPY  11/16/2021    Procedure: CYSTOSCOPY;  Surgeon: Shireen Mayo MD;  [facility];  Service: Urology;;    CYSTOSCOPY  7/19/2022    Procedure: CYSTOSCOPY;  Surgeon: Shireen Mayo MD;  [facility];  Service: Urology;;    CYSTOSCOPY WITH INJECTION OF PERIURETHRAL BULKING AGENT  7/19/2022    Procedure: CYSTOSCOPY, WITH PERIURETHRAL BULKING AGENT INJECTION-MACROPLASTIQUE;  Surgeon: Shireen Mayo MD;  Location: [facility] OR 1ST FLR;  Service: Urology;;    CYSTOSCOPY,WITH BOTULINUM TOXIN INJECTION N/A 12/13/2022    Procedure: CYSTOSCOPY,WITH BOTULINUM TOXIN INJECTION;  Surgeon: Shireen Mayo MD;  Location: [facility] OR 1ST FLR;  Service: Urology;  Laterality: N/A;  300 U    ESOPHAGOGASTRODUODENOSCOPY N/A  5/23/2018    Procedure: ESOPHAGOGASTRODUODENOSCOPY (EGD);  Surgeon: Prince Vance MD;  Location: [facility] (4TH FLR);  Service: Endoscopy;  Laterality: N/A;  r/s 'd per Dr. Vance due to family emergency- ER    HYSTERECTOMY  1975    endometriosis    INJECTION OF BOTULINUM TOXIN TYPE A  7/13/2021    Procedure: INJECTION, BOTULINUM TOXIN, 200units;  Surgeon: Shireen Mayo MD;  Location: [facility] OR 1ST FLR;  Service: Urology;;    INJECTION OF BOTULINUM TOXIN TYPE A  11/16/2021    Procedure: INJECTION, BOTULINUM TOXIN, 200units;  Surgeon: Shireen Mayo MD;  [facility];  Service: Urology;;    INJECTION OF BOTULINUM TOXIN TYPE A  7/19/2022    Procedure: INJECTION, BOTULINUM TOXIN, 300 units ;  Surgeon: Shireen Mayo MD;  [facility];  Service: Urology;;    INSERTION, SUPRAPUBIC CATHETER N/A 12/13/2022    Procedure: INSERTION, SUPRAPUBIC CATHETER;  Surgeon: Shireen Mayo MD;  [facility];  Service: Urology;  Laterality: N/A;  exchange    pain pump placement      SQ Dilaudid Pump managed by Dr. Hillman, Pain Management    REMOVAL OF BONE SPUR OF FOOT Bilateral 9/16/2022    Procedure: EXCISION ARTHRITIC BONE, BILATERAL FOOT;  Surgeon: NICOLA Mcgrath;  Location: [facility] OR;  Service: Podiatry;  Laterality: Bilateral;    REPLACEMENT OF CATHETER N/A 10/31/2019    Procedure: REPLACEMENT, CATHETER-SUPRAPUBIC;  Surgeon: Shireen Mayo MD;  [facility];  Service: Urology;  Laterality: N/A;    SPINAL CORD STIMULATOR REMOVAL      before [facility]    SPINE SURGERY  5-13-13    CERVICAL FUSION    TONSILLECTOMY         Family History   Problem Relation Age of Onset    Cancer Mother 55        breast    Cancer Father         esophagus,had laryngectomy    Esophageal cancer Father     Parkinsonism Maternal Grandmother     Tremor Maternal Grandmother     No Known Problems Brother     No Known Problems Brother     Heart disease Maternal Uncle     Colon cancer  Maternal Uncle         Less than 60    No Known Problems Sister     No Known Problems Maternal Aunt     Cirrhosis Paternal Aunt         ETOH    Liver disease Paternal Aunt         ETOH    Liver disease Paternal Uncle         ETOH    Cirrhosis Paternal Uncle         ETOH     Social History     Socioeconomic History    Marital status:    Tobacco Use    Smoking status: Never    Smokeless tobacco: Never   Substance and Sexual Activity    Alcohol use: Never    Drug use: No    Sexual activity: Never     Partners: Male     Social Determinants of Health     Financial Resource Strain: Low Risk     Difficulty of Paying Living Expenses: Not hard at all   Food Insecurity: No Food Insecurity    Worried About Running Out of Food in the Last Year: Never true    Ran Out of Food in the Last Year: Never true   Transportation Needs: Unmet Transportation Needs    Lack of Transportation (Medical): Yes    Lack of Transportation (Non-Medical): Yes   Physical Activity: Inactive    Days of Exercise per Week: 0 days    Minutes of Exercise per Session: 0 min   Stress: Stress Concern Present    Feeling of Stress : Rather much   Social Connections: Moderately Integrated    Frequency of Communication with Friends and Family: More than three times a week    Frequency of Social Gatherings with Friends and Family: More than three times a week    Attends Alevism Services: Never    Active Member of Clubs or Organizations: Yes    Attends Club or Organization Meetings: Never    Marital Status:    Housing Stability: Low Risk     Unable to Pay for Housing in the Last Year: No    Number of Places Lived in the Last Year: 1    Unstable Housing in the Last Year: No       Allergies:  (d)-limonene flavor; Bactrim [sulfamethoxazole-trimethoprim]; Benadryl [diphenhydramine hcl]; Fentanyl; Imitrex [sumatriptan succinate]; Percocet [oxycodone-acetaminophen]; Topamax [topiramate]; Vancomycin; Butorphanol tartrate; Darvocet a500 [propoxyphene  n-acetaminophen]; Evening primrose (oenothera biennis); White petrolatum-zinc; Zinc oxide-white petrolatum; Latex, natural rubber; and Phenytoin    Medications:  Outpatient Encounter Medications as of 2/10/2023   Medication Sig Dispense Refill    aspirin (ECOTRIN) 81 MG EC tablet Take 1 tablet (81 mg total) by mouth once daily. 30 tablet 0    atorvastatin (LIPITOR) 80 MG tablet TAKE ONE TABLET BY MOUTH EVERY DAY 90 tablet 3    butalbital-acetaminophen-caffeine -40 mg (FIORICET, ESGIC) -40 mg per tablet Take 1 tablet by mouth daily as needed. 15 tablet 0    cephALEXin (KEFLEX) 500 MG capsule Take 1 capsule (500 mg total) by mouth 4 (four) times daily. End 1/25/23      ferrous sulfate 325 (65 FE) MG EC tablet Take 1 tablet (325 mg total) by mouth every other day.      fludrocortisone (FLORINEF) 0.1 mg Tab Take 1 tablet (100 mcg total) by mouth once daily. 30 tablet 0    FLUoxetine 20 MG capsule Take 3 capsules (60 mg total) by mouth once daily. 270 capsule 3    furosemide (LASIX) 40 MG tablet Take 1 tablet (40 mg total) by mouth once daily. 90 tablet 3    HYDROcodone-acetaminophen (NORCO) 5-325 mg per tablet Take 1 tablet by mouth every 8 (eight) hours as needed (breakthrough). 10 tablet 0    hydrocortisone (CORTEF) 10 MG Tab Take 1 tablet (10 mg total) by mouth once daily. 60 tablet 3    hydrocortisone (CORTEF) 5 MG Tab Take 1 tablet (5 mg total) by mouth every evening. 10 tablet 0    intrathecal pain pump compound Hydromorphone (7.5 mg/mL) infusion at 3.6799 mg/day (0.1533 mg/hr) out of a total reservoir volume of 37.3 mL  Pump filled every 2 months      levothyroxine (SYNTHROID) 137 MCG Tab tablet Take 1 tablet (137 mcg total) by mouth before breakfast. 90 tablet 3    LIDOcaine (LIDODERM) 5 % Place 1 patch onto the skin once daily. Remove & Discard patch within 12 hours or as directed by MD  0    pantoprazole (PROTONIX) 40 MG tablet Take 1 tablet (40 mg total) by mouth once daily. 90 tablet 3     potassium chloride SA (K-DUR,KLOR-CON) 20 MEQ tablet Take 1 tablet (20 mEq total) by mouth 2 (two) times daily. 180 tablet 3    promethazine (PHENERGAN) 25 MG tablet Take 25 mg by mouth every 6 (six) hours as needed for Nausea.      QUEtiapine (SEROQUEL) 100 MG Tab TAKE 2 TABLETS (200 MG) BY MOUTH NIGHTLY 180 tablet 3    senna (SENNA LAX) 8.6 mg tablet Take 2 tablets by mouth 2 (two) times daily.      traZODone (DESYREL) 300 MG tablet Take 1 tablet (300 mg total) by mouth every evening. 90 tablet 0    nitroGLYCERIN (NITROSTAT) 0.4 MG SL tablet Place 1 tablet (0.4 mg total) under the tongue every 5 (five) minutes as needed for Chest pain. 25 tablet 3     No facility-administered encounter medications on file as of 2/10/2023.         PHYSICAL EXAMINATION:    The patient generally appears in good health, is appropriately interactive, and is in no apparent distress.    Skin: No lesions.    Mental: Cooperative with normal affect.    Neuro: Grossly intact.    HEENT: Normal. No evidence of lymphadenopathy.    Chest:  normal inspiratory effort.    Abdomen: Soft, non-tender. No masses or organomegaly. Bladder is not palpable. No evidence of flank discomfort. No evidence of inguinal hernia.  The suprapubic tube site is CDI.      Extremities: No clubbing, cyanosis, or edema      LABS:    Lab Results   Component Value Date    BUN 9 01/18/2023    CREATININE 0.9 01/18/2023     Renal ultrasound on 4/16/2022 showed no stones, masses or hydronephrosis    IMPRESSION:    Incomplete bladder emptying from neurogenic bladder  Suprapubic tube    PLAN:    Patient here for botox and periurethral bulking. UA nitrite positive. Exchanging SPT and sending for culture. Sending home on cipro empirically. Will reschedule.    WILLIAMS Sutherland MD  Urology PGY-2    Patient seen in [facility].  She was not treated with appropriate antibiotics when she had the catheter changed in the ER last week. Culture sent from a new catheter and will await C & S to  guide therapy.

## 2023-10-31 NOTE — PLAN OF CARE
Prepping patient for procedure with Dr. Mayo. Patient has spinal cord stimulator and dilaudid pain pump in place. Patient stated she took two vicodin as well as a trazodone in the am before procedure.

## 2023-10-31 NOTE — ANESTHESIA PREPROCEDURE EVALUATION
[facility]  Anesthesia Pre-Operative Evaluation         Patient Name: Tasha Hawley  YOB: 1955  MRN: [id number]    SUBJECTIVE:     Pre-operative evaluation for Procedure(s) (LRB):  CYSTOSCOPY,WITH BOTULINUM TOXIN INJECTION (N/A)  CYSTOSCOPY, WITH PERIURETHRAL BULKING AGENT INJECTION (N/A)     10/31/2023    Tasha Hawley is a 68 y.o. female w/ a significant PMHx of  Morbid obesity, chronic pain on opioids, CHF, GERD. Hypothyroid, CECI, eso dysphagia, CKD, who presents for the above procedure.      LDA: None documented.    Prev airway: None documented.     Drips: None documented.    Patient Active Problem List   Diagnosis    Generalized anxiety disorder    Sleep apnea    Iron deficiency anemia    Major depressive disorder, recurrent, mild    Chronic pain syndrome    Cervical myelopathy    SOB (shortness of breath)    Narcotic dependency, continuous    Congestive heart failure    Thoracic aorta atherosclerosis    Drug-induced constipation    GERD (gastroesophageal reflux disease)    History of stress test    Neurogenic bladder    Frequent falls    Hypothyroidism (acquired)    Lymphedema of both lower extremities    Scoliosis deformity of spine    S/P insertion of intrathecal pump    Paresthesia of both lower extremities    Degenerative disc disease, lumbar    Osteoarthritis of spine with radiculopathy, lumbar region    Oropharyngeal dysphagia    Bradycardia    Bilateral carotid artery disease    Insomnia due to medical condition    Suprapubic catheter    Esophageal dysphagia    Recurrent UTI    Mixed stress and urge urinary incontinence    Pure hypercholesterolemia    Chronic diastolic heart failure    Constipation due to opioid therapy    Leg pain, bilateral    Physical deconditioning    Tremor    UTI (urinary tract infection)    Cellulitis    Anxiety    Calcaneal spur of left foot    Charcot ankle, unspecified laterality     Hypokalemia    Anemia    Preoperative respiratory examination    BMI 36.0-36.9,adult    Stage 3a chronic kidney disease    Intractable pain    Chronic systolic heart failure    Acute on chronic respiratory failure with hypoxia and hypercapnia    Pulmonary hypertension due to diastolic systemic ventricular dysfunction    Pulmonary air trapping    Migraine    Hypothyroid    History of staph infection    Chronic, continuous use of opioids    Coronary artery disease involving native coronary artery of native heart with angina pectoris    Fracture, phalanx, foot    Idiopathic hypotension    Displaced fracture of proximal phalanx of right great toe, initial encounter for closed fracture    Chronic venous hypertension (idiopathic) with ulcer and inflammation of bilateral lower extremity       Review of patient's allergies indicates:   Allergen Reactions    (d)-limonene flavor      Other reaction(s): difficult intubation  Other reaction(s): Difficulty breathing    Bactrim [sulfamethoxazole-trimethoprim] Anaphylaxis    Benadryl [diphenhydramine hcl] Shortness Of Breath    Fentanyl Itching, Nausea And Vomiting and Swelling             Imitrex [sumatriptan succinate] Shortness Of Breath    Percocet [oxycodone-acetaminophen] Shortness Of Breath    Topamax [topiramate] Shortness Of Breath    Vancomycin Anaphylaxis     Rash    Butorphanol tartrate     Darvocet a500 [propoxyphene n-acetaminophen]      Other reaction(s): Difficulty breathing    Evening primrose (oenothera biennis)     Lyrica [pregabalin] Other (See Comments)     tremors    White petrolatum-zinc     Zinc oxide-white petrolatum      Other reaction(s): Difficulty breathing    Latex, natural rubber Itching and Rash    Phenytoin Rash and Other (See Comments)     Trouble breathing       Current Inpatient Medications:      No current facility-administered medications on file prior to encounter.     Current Outpatient Medications on File  Prior to Encounter   Medication Sig Dispense Refill    aspirin (ECOTRIN) 81 MG EC tablet Take 1 tablet (81 mg total) by mouth once daily. 90 tablet 3    atorvastatin (LIPITOR) 80 MG tablet Take 1 tablet (80 mg total) by mouth once daily. 90 tablet 3    butalbital-acetaminophen-caffeine -40 mg (FIORICET, ESGIC) -40 mg per tablet Take 1 tablet by mouth daily as needed.      clindamycin (CLEOCIN) 300 MG capsule Take 1 capsule (300 mg total) by mouth every 8 (eight) hours. 30 capsule 0    cyanocobalamin, vitamin B-12, 5,000 mcg Subl Place 1 tablet under the tongue once daily.      darifenacin (ENABLEX) 7.5 MG Tb24 Take 7.5 mg by mouth.      diclofenac sodium (VOLTAREN ARTHRITIS PAIN) 1 % Gel Apply 4 g topically 4 (four) times daily. 150 g 5    docusate sodium (COLACE) 100 MG capsule Take 200 mg by mouth every evening.      ferrous gluconate (FERGON) 324 MG tablet Take 1 tablet (324 mg total) by mouth daily with breakfast. 90 tablet 1    fludrocortisone (FLORINEF) 0.1 mg Tab Take a half-tablet (50 mcg) by mouth once daily 15 tablet 5    fluticasone propionate (FLONASE) 50 mcg/actuation nasal spray 2 sprays (100 mcg total) by Each Nostril route once daily. 16 g 0    furosemide (LASIX) 40 MG tablet Take 2 tablets (80 mg total) by mouth 2 (two) times a day. 360 tablet 3    HYDROcodone-acetaminophen (NORCO)  mg per tablet Take 1 tablet by mouth every 6 (six) hours as needed. 30 tablet 0    hydrocortisone (CORTEF) 10 MG Tab Take 1 tablet (10 mg total) by mouth every evening. 30 tablet 5    hydrOXYzine (ATARAX) 50 MG tablet Take 1 tablet (50 mg total) by mouth every 4 (four) hours as needed for Anxiety. 30 tablet 0    intrathecal pain pump compound Hydromorphone (7.5 mg/mL) infusion at 8.59 mg/day (0.3578 mg/hr) out of a total reservoir volume of 40 mL  Pump filled monthly      ketorolac (TORADOL) 10 mg tablet Take 10 mg by mouth daily as needed.      levothyroxine (SYNTHROID) 150 MCG tablet  Take 1 tablet (150 mcg total) by mouth before breakfast. 90 tablet 3    LIDOcaine (LIDODERM) 5 % Place 1 patch onto the skin once daily. Remove & Discard patch within 12 hours or as directed by MD  0    liothyronine (CYTOMEL) 5 MCG Tab Take 1 tablet (5 mcg total) by mouth once daily. 30 tablet 11    nitroGLYCERIN (NITROSTAT) 0.4 MG SL tablet Place 0.4 mg under the tongue every 5 (five) minutes as needed for Chest pain.      nystatin (MYCOSTATIN) powder Apply topically 4 (four) times daily. 60 g 0    ondansetron (ZOFRAN-ODT) 4 MG TbDL Take 4 mg by mouth every 4 to 6 hours as needed.      pantoprazole (PROTONIX) 40 MG tablet Take 1 tablet (40 mg total) by mouth once daily. 90 tablet 3    potassium chloride (MICRO-K) 10 MEQ CpSR Take 2 capsules (20 mEq total) by mouth once daily. 60 capsule 11    promethazine (PHENERGAN) 25 MG tablet Take 25 mg by mouth every 6 (six) hours as needed for Nausea.      senna (SENNA LAX) 8.6 mg tablet Take 2 tablets by mouth 2 (two) times daily.      tiotropium bromide (SPIRIVA RESPIMAT) 2.5 mcg/actuation inhaler Inhale 2 puffs into the lungs once daily. Controller 4 g 1       Past Surgical History:   Procedure Laterality Date    ADENOIDECTOMY      BACK SURGERY      x 12    CARDIAC CATHETERIZATION  2016    subtotalled LAD with right to left collaterals    CATARACT EXTRACTION W/  INTRAOCULAR LENS IMPLANT Left     Dr Coleman     CYSTOSCOPIC LITHOLAPAXY N/A 6/27/2019    Procedure: CYSTOLITHOLAPAXY;  Surgeon: Shireen Mayo MD;  Location: [facility] OR [address] [place] [address];  Service: Urology;  Laterality: N/A;    CYSTOSCOPIC LITHOLAPAXY N/A 9/3/2019    Procedure: CYSTOLITHOLAPAXY;  Surgeon: Shireen Mayo MD;  [facility];  Service: Urology;  Laterality: N/A;    CYSTOSCOPY N/A 7/13/2021    Procedure: CYSTOSCOPY;  Surgeon: Shireen Mayo MD;  [facility];  Service: Urology;  Laterality: N/A;    CYSTOSCOPY  11/16/2021    Procedure: CYSTOSCOPY;  Surgeon: Shireen  NIEVES Mayo MD;  [facility];  Service: Urology;;    CYSTOSCOPY  7/19/2022    Procedure: CYSTOSCOPY;  Surgeon: Shireen Mayo MD;  [facility];  Service: Urology;;    CYSTOSCOPY WITH INJECTION OF PERIURETHRAL BULKING AGENT  7/19/2022    Procedure: CYSTOSCOPY, WITH PERIURETHRAL BULKING AGENT INJECTION-MACROPLASTIQUE;  Surgeon: Shireen Mayo MD;  [facility];  Service: Urology;;    CYSTOSCOPY WITH INJECTION OF PERIURETHRAL BULKING AGENT N/A 3/28/2023    Procedure: CYSTOSCOPY, WITH PERIURETHRAL BULKING AGENT INJECTION;  Surgeon: Shireen Mayo MD;  [facility];  Service: Urology;  Laterality: N/A;  [place]    CYSTOSCOPY,WITH BOTULINUM TOXIN INJECTION N/A 12/13/2022    Procedure: CYSTOSCOPY,WITH BOTULINUM TOXIN INJECTION;  Surgeon: Shireen Mayo MD;  [facility];  Service: Urology;  Laterality: N/A;  300 U    CYSTOSCOPY,WITH BOTULINUM TOXIN INJECTION N/A 3/28/2023    Procedure: CYSTOSCOPY,WITH BOTULINUM TOXIN INJECTION;  Surgeon: Shireen Mayo MD;  [facility];  Service: Urology;  Laterality: N/A;  45 MIN.    300 UNITS    ESOPHAGOGASTRODUODENOSCOPY N/A 5/23/2018    Procedure: ESOPHAGOGASTRODUODENOSCOPY (EGD);  Surgeon: Prince Vance MD;  Location: [facility] (4TH FLR);  Service: Endoscopy;  Laterality: N/A;  r/s 'd per Dr. Vance due to family emergency- ER    ESOPHAGOGASTRODUODENOSCOPY N/A 6/23/2023    Procedure: EGD (ESOPHAGOGASTRODUODENOSCOPY);  Surgeon: Juaquin Barry MD;  Location: [facility] ENDO (2ND FLR);  Service: Endoscopy;  Laterality: N/A;  Refferal: PRINCE VANCE  tele enc 5/18/23  dx: history of a Nissen fundoplication  Additional Scheduling Information:  On home oxygen 2nd floor for airway protection also with a pain pump elevated BMI close to 40   Prep Gastroparesis   ins    HYSTERECTOMY  1975    endometriosis    INJECTION OF BOTULINUM TOXIN TYPE A  7/13/2021    Procedure:  "INJECTION, BOTULINUM TOXIN, 200units;  Surgeon: Shireen Mayo MD;  Location: [facility] OR [address] [place] [address];  Service: Urology;;    INJECTION OF BOTULINUM TOXIN TYPE A  11/16/2021    Procedure: INJECTION, BOTULINUM TOXIN, 200units;  Surgeon: Shireen Mayo MD;  [facility];  Service: Urology;;    INJECTION OF BOTULINUM TOXIN TYPE A  7/19/2022    Procedure: INJECTION, BOTULINUM TOXIN, 300 units ;  Surgeon: Shireen Mayo MD;  Location: [facility] OR [address] [place] [address];  Service: Urology;;    INSERTION, SUPRAPUBIC CATHETER N/A 12/13/2022    Procedure: INSERTION, SUPRAPUBIC CATHETER;  Surgeon: Shireen Mayo MD;  Location: [facility] OR [address] [place] [address];  Service: Urology;  Laterality: N/A;  exchange    pain pump placement      SQ Dilaudid Pump managed by Dr. Hillman, Pain Management    REMOVAL OF BONE SPUR OF FOOT Bilateral 9/16/2022    Procedure: EXCISION ARTHRITIC BONE, BILATERAL FOOT;  Surgeon: NICOLA Mcgrath;  Location: [facility] OR;  Service: Podiatry;  Laterality: Bilateral;    REPLACEMENT OF BACLOFEN PUMP N/A 8/2/2023    Procedure: REPLACEMENT, BACLOFEN PUMP;  Surgeon: Smitha Condon MD;  Location: [facility] OR [address] [place] [address];  Service: Neurosurgery;  Laterality: N/A;  Anes: Gen  Blood: Type & Screen  Pos: Left Lat  Intrathecal Pump  Bed: Reg Reversed  Rad: C-arm  Pump: 40 cc, medtronics    REPLACEMENT OF CATHETER N/A 10/31/2019    Procedure: REPLACEMENT, CATHETER-SUPRAPUBIC;  Surgeon: Shireen Mayo MD;  Location: [facility] OR [address] [place] [address];  Service: Urology;  Laterality: N/A;    SPINAL CORD STIMULATOR REMOVAL      before [place]    SPINE SURGERY  5-13-13    CERVICAL FUSION    TONSILLECTOMY         Social History     Socioeconomic History    Marital status:    Tobacco Use    Smoking status: Never    Smokeless tobacco: Never   Substance and Sexual Activity    Alcohol use: Never    Drug use: Yes     Types: Marijuana     Comment: "medical marijuana gummies"     Social Determinants of Health     Financial Resource Strain: " "Low Risk  (9/11/2023)    Overall Financial Resource Strain (CARDIA)     Difficulty of Paying Living Expenses: Not hard at all   Food Insecurity: No Food Insecurity (9/11/2023)    Hunger Vital Sign     Worried About Running Out of Food in the Last Year: Never true     Ran Out of Food in the Last Year: Never true   Transportation Needs: No Transportation Needs (9/11/2023)    PRAPARE - Transportation     Lack of Transportation (Medical): No     Lack of Transportation (Non-Medical): No   Physical Activity: Inactive (9/11/2023)    Exercise Vital Sign     Days of Exercise per Week: 0 days     Minutes of Exercise per Session: 0 min   Stress: Stress Concern Present (9/11/2023)    [employer] - Occupational Stress Questionnaire     Feeling of Stress : To some extent   Social Connections: Moderately Isolated (9/11/2023)    Social Connection and Isolation Panel [NHANES]     Frequency of Communication with Friends and Family: More than three times a week     Frequency of Social Gatherings with Friends and Family: Three times a week     Attends Mandaeism Services: Never     Active Member of Clubs or Organizations: No     Attends Club or Organization Meetings: Never     Marital Status:    Housing Stability: Low Risk  (9/11/2023)    Housing Stability Vital Sign     Unable to Pay for Housing in the Last Year: No     Number of Places Lived in the Last Year: 1     Unstable Housing in the Last Year: No       OBJECTIVE:     Vital Signs Range (Last 24H):  Pulse:  [83]   BP: (114)/(74)   SpO2:  [96 %]       CBC:   No results for input(s): "WBC", "RBC", "HGB", "HCT", "PLT", "MCV", "MCH", "MCHC" in the last 72 hours.    CMP: No results for input(s): "NA", "K", "CL", "CO2", "BUN", "CREATININE", "GLU", "MG", "PHOS", "CALCIUM", "ALBUMIN", "PROT", "ALKPHOS", "ALT", "AST", "BILITOT" in the last 72 hours.    INR:  No results for input(s): "PT", "INR", "PROTIME", "APTT" in the last 72 " hours.    Diagnostic Studies: No relevant studies.    EKG:   Results for orders placed or performed during the hospital encounter of 10/04/23   EKG 12-lead    Collection Time: 10/04/23  4:32 PM    Narrative    Test Reason : R06.02,    Vent. Rate : 058 BPM     Atrial Rate : 058 BPM     P-R Int : 182 ms          QRS Dur : 112 ms      QT Int : 452 ms       P-R-T Axes : 040 -07 029 degrees     QTc Int : 443 ms    Sinus bradycardia  Cannot rule out Anterior infarct (cited on or before 30-SEP-2023)  Abnormal ECG  When compared with ECG of 30-SEP-2023 22:46,  No significant change was found  Confirmed by Jerri Plascencia MD ([id number]) on 10/5/2023 2:45:25 PM    Referred By: [email or web address]   SELF           Confirmed By:Jerri Plascencia MD        2D ECHO:   Results for orders placed during the hospital encounter of 08/21/23    Echo    Interpretation Summary    Left Ventricle: The left ventricle is normal in size. Normal wall thickness. Normal wall motion. There is normal systolic function. Ejection fraction by visual approximation is 65%. There is normal diastolic function.    Left Atrium: Left atrium is mildly dilated.    Right Ventricle: Systolic function is normal.    Pulmonary Artery: The estimated pulmonary artery systolic pressure is 51 mmHg.    IVC/SVC: Elevated venous pressure at 15 mmHg.         ASSESSMENT/PLAN:           Pre-op Assessment    I have reviewed the Patient Summary Reports.     I have reviewed the Nursing Notes. I have reviewed the NPO Status.   I have reviewed the Medications.     Review of Systems  Anesthesia Hx:  No problems with previous Anesthesia  History of prior surgery of interest to airway management or planning: cervical fusion. Previous anesthesia: MAC  6/23/23-EGD with MAC.  Procedure performed at an [facility]. Denies Family Hx of Anesthesia complications.   Denies Personal Hx of Anesthesia complications.   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 CAD    Angina, with exertion CHF hyperlipidemia ECG has been reviewed. EF 65% Functional Capacity 2 METS, USES WHEELCHAIR/ROLLATOR  Carotoid Artery Disease    Pulmonary:   COPD Sleep Apnea RECENT PNEUMONIA-FEW MONTHS AGO Pulmonary Symptoms:  are dependence on supplemental O2.  Dependence on O2 is PRN with activity  Chronic Obstructive Pulmonary Disease (COPD): Emphysema Hospitalization required in the past year. Inhaler use is maintenance inhaler daily and maintenance inhaler PRN.  Obstructive Sleep Apnea (CECI), CPAP prescribed.   Renal/:   Chronic Renal Disease, CKD  Devices/Procedures/Surgery, suprapubic catheter. Kidney Function/Disease, Chronic Kidney Disease (CKD) , CKD Stage III (GFR 30-59)    Hepatic/GI:   GERD NEUROMUSCULAR DYSFUNCTION OF THE BLADDER   Musculoskeletal:   Arthritis  BACK SURGERIES X 12, SEVERAL LUMBAR SURGERIES AND H/O CERVICAL FUSION, CHRONIC NECK/BACK PAIN Joint Disease:  Arthritis, Osteoarthritis  Cervical Spine Disorder, S/P Cervical Fusion    Neurological:   Denies CVA. Headaches Denies Seizures. LUMBAR RADICULOPATHY Dx of Headaches, Migraine Headache Pain Syndrome  Osteoarthritis    Endocrine:   Denies Diabetes. Hypothyroidism  Obesity / BMI > 30  Psych:   anxiety depression          Physical Exam  General: Well nourished, Cooperative, Alert and Oriented    Airway:  Mouth Opening: Normal  TM Distance: Normal  Tongue: Normal  Neck ROM: Normal ROM    Dental:  Intact    Chest/Lungs:  Clear to auscultation, Normal Respiratory Rate    Heart:  Rate: Normal  Rhythm: Regular Rhythm  Sounds: Normal    Abdomen:  Normal        Anesthesia Plan  Type of Anesthesia, risks & benefits  discussed:    Anesthesia Type: Gen Natural Airway, Gen ETT, MAC  Intra-op Monitoring Plan: Standard ASA Monitors  Post Op Pain Control Plan: multimodal analgesia and IV/PO Opioids PRN  Induction:  IV  Airway Plan: Direct, Post-Induction  Informed Consent: Informed consent signed with the Patient and all parties understand the risks and agree with anesthesia plan.  All questions answered.   ASA Score: 3  Day of Surgery Review of History & Physical: H&P Update referred to the surgeon/provider.    Ready For Surgery From Anesthesia Perspective.     .

## 2023-11-01 ENCOUNTER — TELEPHONE (OUTPATIENT)
Dept: UROLOGY | Facility: CLINIC | Age: 68
End: 2023-11-01
Payer: MEDICARE

## 2023-11-01 ENCOUNTER — TELEPHONE (OUTPATIENT)
Dept: INTERNAL MEDICINE | Facility: CLINIC | Age: 68
End: 2023-11-01
Payer: MEDICARE

## 2023-11-01 LAB
BACTERIA UR CULT: NORMAL
BACTERIA UR CULT: NORMAL

## 2023-11-01 NOTE — TELEPHONE ENCOUNTER
I called and rescheduled the patient for her surgery with Dr Mayo for 11-. I sent a email to scheduling to place her on the schedule.

## 2023-11-01 NOTE — TELEPHONE ENCOUNTER
----- Message from Reema Hutchinson sent at 11/1/2023  8:58 AM CDT -----  Contact: [phone]  Pt states her orders for HH that were sent in yesterday did not go through. Can you please resend? Thanks

## 2023-11-01 NOTE — TELEPHONE ENCOUNTER
Spoke to patient and advised to call Dr. Mayo's office since that's who placed the HH order. Patient understood. LB

## 2023-11-01 NOTE — PLAN OF CARE
[facility]  Physical Therapy Initial Evaluation    Name: Tasha Hawlye  Clinic Number: [id number]    Therapy Diagnosis:   Encounter Diagnoses   Name Primary?    Lymphedema     Leg pain, bilateral Yes     Physician: Mesfin Hodges II, MD    Physician Orders: PT Eval and Treat - lymphedema  Medical Diagnosis from Referral: I89.0 (ICD-10-CM) - Lymphedema   Evaluation Date: 10/25/2023  Authorization Period Expiration: 9/21/2024  Plan of Care Expiration: 12/4/2023  Visit # / Visits authorized: 1/ 1    Time In: 12:00pm- pt late    Time Out: 1:00pm  Total Billable Time: 60 minutes    Precautions: Standard, blood thinners, Fall, and CHF    Subjective   Date of onset: years ago   History of current condition - Tasha reports: she has had swelling in both legs for years. It started after she had a back injury. The swelling is getting worse. She is suppoed to get her legs wrapped 2x a week but it is more like 1x every week or two weeks. She gets blisters on both legs. She has blisters on both legs currently. She has been hospitalized for her swelling. She has a catheter that she has been using for 3 years.     Pt denies  DM and CA.   Pt reports CHF and KF     Fluid pill: Yes  Blood thinner: Yes     Medical History:   Past Medical History:   Diagnosis Date    Anxiety     Arthritis     Bilateral lower extremity edema     severe chronic    Carotid artery occlusion     Cataract     CHF (congestive heart failure)     Coronary artery disease     subtotalled LAD with collateral    Depression     Fever blister     Hard of hearing     Hypokalemia 01/09/2023    Hyponatremia 02/04/2022    Hypothyroid     Iron deficiency anemia     Lumbar radiculopathy     with chronic pain    Ocular migraine     Other emphysema 05/22/2023    Renal disorder     Sleep apnea     cpap       Surgical History:   Tasha Hawley  has a past surgical history that includes Hysterectomy (1975); Back surgery; pain pump placement;  Spine surgery (5-13-13); Cataract extraction w/  intraocular lens implant (Left); Spinal cord stimulator removal; Esophagogastroduodenoscopy (N/A, 5/23/2018); Tonsillectomy; Adenoidectomy; Cardiac catheterization (2016); Cystoscopic litholapaxy (N/A, 6/27/2019); Cystoscopic litholapaxy (N/A, 9/3/2019); Replacement of catheter (N/A, 10/31/2019); Cystoscopy (N/A, 7/13/2021); Injection of botulinum toxin type A (7/13/2021); Cystoscopy (11/16/2021); Injection of botulinum toxin type A (11/16/2021); Cystoscopy (7/19/2022); Cystoscopy with injection of periurethral bulking agent (7/19/2022); Injection of botulinum toxin type A (7/19/2022); Removal of bone spur of foot (Bilateral, 9/16/2022); cystoscopy,with botulinum toxin injection (N/A, 12/13/2022); insertion, suprapubic catheter (N/A, 12/13/2022); cystoscopy,with botulinum toxin injection (N/A, 3/28/2023); Cystoscopy with injection of periurethral bulking agent (N/A, 3/28/2023); Esophagogastroduodenoscopy (N/A, 6/23/2023); and Replacement of baclofen pump (N/A, 8/2/2023).    Medications:   Tasha has a current medication list which includes the following prescription(s): amoxicillin-clavulanate 875-125mg, aspirin, atorvastatin, butalbital-acetaminophen-caffeine -40 mg, ciprofloxacin hcl, clindamycin, cyanocobalamin (vitamin b-12), darifenacin, diclofenac sodium, docusate sodium, ferrous gluconate, fludrocortisone, fluoxetine, fluticasone propionate, furosemide, hydrocodone-acetaminophen, hydrocortisone, hydroxyzine, intrathecal pain pump compound, ketorolac, levothyroxine, lidocaine, liothyronine, nitroglycerin, nystatin, ondansetron, pantoprazole, potassium chloride, promethazine, quetiapine, senna, tiotropium bromide, tramadol, and trazodone, and the following Facility-Administered Medications: sodium chloride 0.9%, gentamicin (GARAMYCIN) 320 mg in sodium chloride 0.9% 100 mL IVPB, and oxacillin 2 g in sodium chloride 0.9 % 100 mL IVPB (MB+).    Allergies:    Review of patient's allergies indicates:   Allergen Reactions    (d)-limonene flavor      Other reaction(s): difficult intubation  Other reaction(s): Difficulty breathing    Bactrim [sulfamethoxazole-trimethoprim] Anaphylaxis    Benadryl [diphenhydramine hcl] Shortness Of Breath    Fentanyl Itching, Nausea And Vomiting and Swelling             Imitrex [sumatriptan succinate] Shortness Of Breath    Percocet [oxycodone-acetaminophen] Shortness Of Breath    Topamax [topiramate] Shortness Of Breath    Vancomycin Anaphylaxis     Rash    Butorphanol tartrate     Darvocet a500 [propoxyphene n-acetaminophen]      Other reaction(s): Difficulty breathing    Evening primrose (oenothera biennis)     Lyrica [pregabalin] Other (See Comments)     tremors    White petrolatum-zinc     Zinc oxide-white petrolatum      Other reaction(s): Difficulty breathing    Latex, natural rubber Itching and Rash    Phenytoin Rash and Other (See Comments)     Trouble breathing        Imaging,     Venous US:       There is no evidence of a right lower extremity DVT.    There is no evidence of a left lower extremity DVT.    The left superficial femoral middle vein is not well visualized.    The right superficial femoral middle vein is not well visualized.    No evidence of superficial or deep reflux.  However the study is technically difficult       Surgery: none in legs  Radiation:  none weeks  Chemotherapy: none   Previous Lymphedema Treatment: gets nursing HH to wrap legs   Prior Therapy: none  Social History: Lives with    Environmental barriers:  very hard for her to get around house   Abuse/Neglect: Pt shows no visible signs of abuse/neglect   Nutritional status: Pt reports no nutritional needs    Educational needs: Pt reports no educational needs    Spiritual/Cultural: Pt reports no spiritual/ cultural needs     Fall risk: fell yesterday   Occupation: not working   Prior Level of Function: unable to specify, difficult to direct pt to  questions   Current Level of Function: Needs help for many things, uses a WC, has a catheter, needs help with dressing and bathing  Gait: pt arrives in WC   Transfers: Mod I   Bed Mobility: Mod I     Pain and Swelling:  Pt reports significant pain currently, hard to direct pt to verbalize in number format. Pt reports her pain is both legs and it is constant. She reports her legs are tender with touch and both legs are very painful currently.     Pts goals: unsure     Objective     Pt arrives with neighbor- reports he helps pt and her  and helps pt with wrapping her legs with bandages provided by nursing.   Pt arrives with catheter, in WC  Pt arrives with both legs wrapped. Explained to pt that PT does not have exact bandages but pt reports she has nursing bandages at home and pt's neighbor reports he can wrap her leg for her.   Pt in agreement to remove RLE bandages for evaluation   Pt appears to be wrapped in liner and Koban from foot to lower leg       Amount of Swelling/Location of Swelling: Moderate swelling of RLE, unable to visualize LLE due to bandaging   Skin Integrity: intact except for small open blister on the back of pt's RLE. Skin is warm but not hot, pink but not red in color.    Palpation/Texture: Pt very sensitive to touch, non pitting edema RLE.    + B Stemmer Sign   Gianfranco's Sign- pt's BLE are tender to touch universarlly, pt denies chest pain or SOB outside usual. Pt's calf not hot to touch and pink, not red   Circulation: intact on RLE      Posture: FHP       Strength: functional screen of AROM against gravity and sit to stand transfers       Girth Measurements (in centimeters)  Unable to obtain girth measurements due to time, see pictures below               CMS Impairment/Limitation/Restriction for FOTO LE edema  Survey  Limitation: 35%    Therapist reviewed FOTO scores for Tasha Hawley on 10/25/2023.   FOTO documents entered into [employer] - see Media section.       TREATMENT     Total  Treatment time separate from Evaluation: 30 minutes    Tasha received the following manual therapy techniques: Manual Lymphatic Drainage were applied to the: RLE for 20 minutes, including:  Education and training in compression needs.    MULTILAYERED BANDAGING:  issued supplies and bandaged R LE with cotton stockinette,  2 cellona rolls foot to knee, 1-8cm and 2- 10cm Durelast rolls foot to knee, to leave intact 12-24 hrs as tolerated, discontinue with any problems, return rolled bandages next session. Wash and wear schedules confirmed.     Xeroform gauze dressing applied over small open blister on back of pt's leg  Pt instructed to remove after 12-24 hours or if painful and return to nursing bandages   Commitment to attendance as well as commitment to securing compression needs is critical to edema management.    Pt educated on CDT, lymphedema, venous insufficiency, signs of infection, to call MD If signs of infection arise      Home Exercises and Patient Education Provided  Education provided:   - lymph booklet   - Pt was educated in lymphedema etiology and management plans.  Pt was provided with written risk reductions and precautions for managing lymphedema.      This patient is in agreement to participate in Lymphedema treatment.    Written Home Exercises Provided: yes.  Exercises were reviewed and Tasha was able to demonstrate them prior to the end of the session.  Tasha demonstrated good  understanding of the education provided.     See EMR under Patient Instructions for exercises provided 10/25/2023.    Assessment   Tasha is a 68 y.o. female referred to outpatient Physical Therapy with a medical diagnosis of I89.0 (ICD-10-CM) - Lymphedema . This patient presents s/p CVI,    resulting in: lymphedema of the BLEs, increased pain, as well as difficulty performing walking, transfers, ADLs , compression needs, and placing the pt at higher risk of infection.     Pt's RLE was unwrapped after pt and pt's friend  confirmed they have replacement bandages at home. Pt's RLE appears pink but not red and not hot to touch. Pt's RLE has one blister which was covered with xeroform before applying multilayered bandages. Pt wad instructed to remove if painful but reported increased comfort in clinic. Pt has HH care and was educated insurance may not cover both therapy and HH At the same time. Pt was educated on lymphedema causes and physiology, infection signs and symptoms, complete decongestive therapy and its components, and expectations for therapy. Pt was also educated on the need for some type of compression.       Pt prognosis is Fair.   Pt will benefit from skilled outpatient Physical Therapy to address the deficits stated above and in the chart below, provide pt/family education, and to maximize pt's level of independence.     Plan of care discussed with patient: Yes  Pt's spiritual, cultural and educational needs considered and patient is agreeable to the plan of care and goals as stated below:     Medical Necessity is demonstrated by the following  History  Co-morbidities and personal factors that may impact the plan of care [] LOW: no personal factors / co-morbidities  [] MODERATE: 1-2 personal factors / co-morbidities  [x] HIGH: 3+ personal factors / co-morbidities    Moderate / High Support Documentation: DEP  HTN  CHF  BMI     Examination  Body Structures and Functions, activity limitations and participation restrictions that may impact the plan of care [] LOW: addressing 1-2 elements  [] MODERATE: 3+ elements  [x] HIGH: 4+ elements (please support below)    Moderate / High Support Documentation: transfers, walking, leg pain, swelling, skin integrity      Clinical Presentation [] LOW: stable  [] MODERATE: Evolving  [x] HIGH: Unstable     Decision Making/ Complexity Score: high       Anticipated Barriers for therapy: PMH    The following goals were discussed with the patient and patient is in agreement with them as to be  addressed in the treatment plan.     Short Term Goals: (6 weeks)  1. Patient will show decreased girth in B LE by up to 1 cm to allow for LE symmetry, shoe and clothing choice, and ability to apply needed compression.  (progressing, not met)   2. Patient will demonstrate 100% knowledge of lymphedema precautions and signs of infection to allow for reduced lymphedema risk, infection risk, and/or exacerbation of condition.  (progressing, not met)  3. Patient or caregiver will perform self-bandaging techniques and/or wearing of compression garments to allow for lymphatic drainage support, skin elasticity, and reduction in shape and size of limb. (progressing, not met)  4. Patient will perform self lymph drainage techniques to areas within reach to enhance lymphatic drainage and skin condition.  (progressing, not met)  5. Patient will tolerate daily activities with multilayered bandaging to allow for lymphatic and venous support.  (progressing, not met)    Long Term Goals: (12  weeks)  1. Patient will show decreased girth in B LE by up to 2 cm  to allow for LE symmetry, shoe and clothing choice, and ability to apply needed compression daily.  (progressing, not met)  2. Patient will show reduction in density to mild or less with improved contour of limb to allow for cosmesis, LE symmetry, infection risk reduction, and clothing and compression choice.   (progressing, not met)  3. Patient to vernell/doff compression garment with daily compliance to assist in lymphedema management, skin elasticity, and tissue density.  (progressing, not met)  4. Pt to show improved postural awareness and alignment.  (progressing, not met)  5. Pt to be I and compliant with HEP to allow for increased function in affected limb.   (progressing, not met)  Plan   Plan of care Certification: 10/25/2023 to 12/4/2023.    Outpatient Physical Therapy 2 times weekly for 6 weeks to include the following interventions: Gait Training, Manual Therapy,  Neuromuscular Re-ed, Patient Education, Self Care, Therapeutic Activities, and Therapeutic Exercise. Complete Decongestive Therapy- compression and home equipment needs to be addressed and assisted.    Pt may be seen by a PTA as part of the Rehab treatment team.  Plan of Care was discussed with ROCKY Lucia, PT

## 2023-11-02 ENCOUNTER — HOSPITAL ENCOUNTER (EMERGENCY)
Facility: HOSPITAL | Age: 68
Discharge: HOME | End: 2023-11-02
Attending: STUDENT IN AN ORGANIZED HEALTH CARE EDUCATION/TRAINING PROGRAM
Payer: MEDICARE

## 2023-11-02 VITALS
SYSTOLIC BLOOD PRESSURE: 124 MMHG | HEART RATE: 71 BPM | DIASTOLIC BLOOD PRESSURE: 58 MMHG | OXYGEN SATURATION: 93 % | RESPIRATION RATE: 20 BRPM | TEMPERATURE: 98 F

## 2023-11-02 DIAGNOSIS — I89.0 LYMPHEDEMA: Primary | ICD-10-CM

## 2023-11-02 DIAGNOSIS — M79.89 LEG SWELLING: ICD-10-CM

## 2023-11-02 LAB
ALBUMIN SERPL BCP-MCNC: 3.7 G/DL (ref 3.5–5.2)
ALP SERPL-CCNC: 172 U/L (ref 55–135)
ALT SERPL W/O P-5'-P-CCNC: 11 U/L (ref 10–44)
ANION GAP SERPL CALC-SCNC: 14 MMOL/L (ref 8–16)
AST SERPL-CCNC: 14 U/L (ref 10–40)
BASOPHILS # BLD AUTO: 0.03 K/UL (ref 0–0.2)
BASOPHILS NFR BLD: 0.5 % (ref 0–1.9)
BILIRUB SERPL-MCNC: 0.4 MG/DL (ref 0.1–1)
BNP SERPL-MCNC: <10 PG/ML (ref 0–99)
BUN SERPL-MCNC: 7 MG/DL (ref 8–23)
CALCIUM SERPL-MCNC: 9.6 MG/DL (ref 8.7–10.5)
CHLORIDE SERPL-SCNC: 98 MMOL/L (ref 95–110)
CO2 SERPL-SCNC: 28 MMOL/L (ref 23–29)
CREAT SERPL-MCNC: 1 MG/DL (ref 0.5–1.4)
DIFFERENTIAL METHOD: ABNORMAL
EOSINOPHIL # BLD AUTO: 0.3 K/UL (ref 0–0.5)
EOSINOPHIL NFR BLD: 6 % (ref 0–8)
ERYTHROCYTE [DISTWIDTH] IN BLOOD BY AUTOMATED COUNT: 14.4 % (ref 11.5–14.5)
EST. GFR  (NO RACE VARIABLE): >60 ML/MIN/1.73 M^2
GLUCOSE SERPL-MCNC: 100 MG/DL (ref 70–110)
HCT VFR BLD AUTO: 40 % (ref 37–48.5)
HGB BLD-MCNC: 12.4 G/DL (ref 12–16)
IMM GRANULOCYTES # BLD AUTO: 0.01 K/UL (ref 0–0.04)
IMM GRANULOCYTES NFR BLD AUTO: 0.2 % (ref 0–0.5)
LYMPHOCYTES # BLD AUTO: 1.1 K/UL (ref 1–4.8)
LYMPHOCYTES NFR BLD: 19.2 % (ref 18–48)
MCH RBC QN AUTO: 26.8 PG (ref 27–31)
MCHC RBC AUTO-ENTMCNC: 31 G/DL (ref 32–36)
MCV RBC AUTO: 87 FL (ref 82–98)
MONOCYTES # BLD AUTO: 0.5 K/UL (ref 0.3–1)
MONOCYTES NFR BLD: 8.5 % (ref 4–15)
NEUTROPHILS # BLD AUTO: 3.6 K/UL (ref 1.8–7.7)
NEUTROPHILS NFR BLD: 65.8 % (ref 38–73)
NRBC BLD-RTO: 0 /100 WBC
PLATELET # BLD AUTO: 229 K/UL (ref 150–450)
PMV BLD AUTO: 9.3 FL (ref 9.2–12.9)
POTASSIUM SERPL-SCNC: 3.4 MMOL/L (ref 3.5–5.1)
PROT SERPL-MCNC: 8.4 G/DL (ref 6–8.4)
RBC # BLD AUTO: 4.62 M/UL (ref 4–5.4)
SODIUM SERPL-SCNC: 140 MMOL/L (ref 136–145)
TROPONIN I SERPL DL<=0.01 NG/ML-MCNC: <0.006 NG/ML (ref 0–0.03)
WBC # BLD AUTO: 5.53 K/UL (ref 3.9–12.7)

## 2023-11-02 PROCEDURE — 85025 COMPLETE CBC W/AUTO DIFF WBC: CPT | Performed by: NURSE PRACTITIONER

## 2023-11-02 PROCEDURE — 93010 ELECTROCARDIOGRAM REPORT: CPT | Mod: ,,, | Performed by: INTERNAL MEDICINE

## 2023-11-02 PROCEDURE — 80053 COMPREHEN METABOLIC PANEL: CPT | Performed by: NURSE PRACTITIONER

## 2023-11-02 PROCEDURE — 63600175 PHARM REV CODE 636 W HCPCS: Performed by: STUDENT IN AN ORGANIZED HEALTH CARE EDUCATION/TRAINING PROGRAM

## 2023-11-02 PROCEDURE — 93005 ELECTROCARDIOGRAM TRACING: CPT

## 2023-11-02 PROCEDURE — 93010 EKG 12-LEAD: ICD-10-PCS | Mod: ,,, | Performed by: INTERNAL MEDICINE

## 2023-11-02 PROCEDURE — 84484 ASSAY OF TROPONIN QUANT: CPT | Performed by: NURSE PRACTITIONER

## 2023-11-02 PROCEDURE — 99285 EMERGENCY DEPT VISIT HI MDM: CPT | Mod: 25

## 2023-11-02 PROCEDURE — 96375 TX/PRO/DX INJ NEW DRUG ADDON: CPT

## 2023-11-02 PROCEDURE — 96374 THER/PROPH/DIAG INJ IV PUSH: CPT

## 2023-11-02 PROCEDURE — 83880 ASSAY OF NATRIURETIC PEPTIDE: CPT | Performed by: NURSE PRACTITIONER

## 2023-11-02 RX ORDER — HYDROMORPHONE HYDROCHLORIDE 1 MG/ML
0.5 INJECTION, SOLUTION INTRAMUSCULAR; INTRAVENOUS; SUBCUTANEOUS
Status: COMPLETED | OUTPATIENT
Start: 2023-11-02 | End: 2023-11-02

## 2023-11-02 RX ORDER — FUROSEMIDE 10 MG/ML
80 INJECTION INTRAMUSCULAR; INTRAVENOUS
Status: COMPLETED | OUTPATIENT
Start: 2023-11-02 | End: 2023-11-02

## 2023-11-02 RX ADMIN — FUROSEMIDE 80 MG: 10 INJECTION, SOLUTION INTRAMUSCULAR; INTRAVENOUS at 11:11

## 2023-11-02 RX ADMIN — HYDROMORPHONE HYDROCHLORIDE 0.5 MG: 1 INJECTION, SOLUTION INTRAMUSCULAR; INTRAVENOUS; SUBCUTANEOUS at 11:11

## 2023-11-02 NOTE — ED NOTES
Pt arrive from home complains of of bilateral leg pain and swelling. Pt has a hx of CHF and Coronary artery disease. Pt appears to be comfortable no distress noted.

## 2023-11-02 NOTE — ED PROVIDER NOTES
NAME:  Tasha Hawley  CSN:     [id number]  MRN:    [id number]  ADMIT DATE: 11/2/2023        eMERGENCY dEPARTMENT eNCOUnter    CHIEF COMPLAINT    Chief Complaint   Patient presents with    Leg Swelling     Bilateral lower leg swelling and pain with blisters noted. Bilateral lower lymphedema +winded with exertion       HPI    Tasha Hawley is a 68 y.o. female with a past medical history of  has a past medical history of Anxiety, Arthritis, Bilateral lower extremity edema, Carotid artery occlusion, Cataract, CHF (congestive heart failure), Coronary artery disease, Depression, Fever blister, Hard of hearing, Hypokalemia (01/09/2023), Hyponatremia (02/04/2022), Hypothyroid, Iron deficiency anemia, Lumbar radiculopathy, Ocular migraine, Other emphysema (05/22/2023), Renal disorder, and Sleep apnea.     she presents to the ED due to acute on chronic swelling to the lower extremities.  Long history of lymphedema which she takes Lasix 80 mg twice daily for without missing any doses.  States that they are starting to weep and has painful blisters to her feet which she is kept dressed.  No fevers or chills.  Notes that she does typically have redness over the lower legs.  Does have an indwelling catheter which she states was just changed yesterday.  Currently on Cipro for UTI, states this is the 2nd time in 2 weeks she is been treated for this.  Normal appetite.  No cough or congestion.  Walks with a walker at baseline.  States that she does have home health come out intermittently in working to set it up more frequently currently.  Does not currently follow with wound care.  Appreciate triage note but denies any dyspnea, chest pain or shortness of breath currently.  Does note she has a Dilaudid pump for chronic low back pain.    HPI       PAST MEDICAL HISTORY  Past Medical History:   Diagnosis Date    Anxiety     Arthritis     Bilateral lower extremity edema     severe chronic    Carotid artery occlusion     Cataract      CHF (congestive heart failure)     Coronary artery disease     subtotalled LAD with collateral    Depression     Fever blister     Hard of hearing     Hypokalemia 01/09/2023    Hyponatremia 02/04/2022    Hypothyroid     Iron deficiency anemia     Lumbar radiculopathy     with chronic pain    Ocular migraine     Other emphysema 05/22/2023    Renal disorder     Sleep apnea     cpap       SURGICAL HISTORY    Past Surgical History:   Procedure Laterality Date    ADENOIDECTOMY      BACK SURGERY      x 12    CARDIAC CATHETERIZATION  2016    subtotalled LAD with right to left collaterals    CATARACT EXTRACTION W/  INTRAOCULAR LENS IMPLANT Left     Dr Coleman     CYSTOSCOPIC LITHOLAPAXY N/A 6/27/2019    Procedure: CYSTOLITHOLAPAXY;  Surgeon: Shireen Mayo MD;  Location: [facility] OR 2ND FLR;  Service: Urology;  Laterality: N/A;    CYSTOSCOPIC LITHOLAPAXY N/A 9/3/2019    Procedure: CYSTOLITHOLAPAXY;  Surgeon: Shireen Mayo MD;  Location: [facility] OR 2ND FLR;  Service: Urology;  Laterality: N/A;    CYSTOSCOPY N/A 7/13/2021    Procedure: CYSTOSCOPY;  Surgeon: Shireen Mayo MD;  Location: [facility] OR 1ST FLR;  Service: Urology;  Laterality: N/A;    CYSTOSCOPY  11/16/2021    Procedure: CYSTOSCOPY;  Surgeon: Shireen Mayo MD;  [facility];  Service: Urology;;    CYSTOSCOPY  7/19/2022    Procedure: CYSTOSCOPY;  Surgeon: Shireen Mayo MD;  [facility];  Service: Urology;;    CYSTOSCOPY WITH INJECTION OF PERIURETHRAL BULKING AGENT  7/19/2022    Procedure: CYSTOSCOPY, WITH PERIURETHRAL BULKING AGENT INJECTION-MACROPLASTIQUE;  Surgeon: Shireen Mayo MD;  Location: [facility] OR 1ST FLR;  Service: Urology;;    CYSTOSCOPY WITH INJECTION OF PERIURETHRAL BULKING AGENT N/A 3/28/2023    Procedure: CYSTOSCOPY, WITH PERIURETHRAL BULKING AGENT INJECTION;  Surgeon: Shireen Mayo MD;  Location: [facility] OR 1ST FLR;  Service: Urology;  Laterality: N/A;  [place]    CYSTOSCOPY,WITH BOTULINUM TOXIN INJECTION N/A  12/13/2022    Procedure: CYSTOSCOPY,WITH BOTULINUM TOXIN INJECTION;  Surgeon: Shireen Mayo MD;  Location: [facility] OR 1ST FLR;  Service: Urology;  Laterality: N/A;  300 U    CYSTOSCOPY,WITH BOTULINUM TOXIN INJECTION N/A 3/28/2023    Procedure: CYSTOSCOPY,WITH BOTULINUM TOXIN INJECTION;  Surgeon: Shireen Mayo MD;  Location: [facility] OR 1ST FLR;  Service: Urology;  Laterality: N/A;  45 MIN.    300 UNITS    ESOPHAGOGASTRODUODENOSCOPY N/A 5/23/2018    Procedure: ESOPHAGOGASTRODUODENOSCOPY (EGD);  Surgeon: Prince Vance MD;  Location: [facility] ENDO (4TH FLR);  Service: Endoscopy;  Laterality: N/A;  r/s 'd per Dr. Vance due to family emergency- ER    ESOPHAGOGASTRODUODENOSCOPY N/A 6/23/2023    Procedure: EGD (ESOPHAGOGASTRODUODENOSCOPY);  Surgeon: Juaquin Barry MD;  Location: [facility] ENDO (2ND FLR);  Service: Endoscopy;  Laterality: N/A;  Refferal: PRINCE VANCE  Order  tele enc 5/18/23  dx: history of a Nissen fundoplication  Additional Scheduling Information:  On home oxygen 2nd floor for airway protection also with a pain pump elevated BMI close to 40   Prep Gastroparesis   ins    HYSTERECTOMY  1975    endometriosis    INJECTION OF BOTULINUM TOXIN TYPE A  7/13/2021    Procedure: INJECTION, BOTULINUM TOXIN, 200units;  Surgeon: Shireen Mayo MD;  Location: [facility] OR 1ST FLR;  Service: Urology;;    INJECTION OF BOTULINUM TOXIN TYPE A  11/16/2021    Procedure: INJECTION, BOTULINUM TOXIN, 200units;  Surgeon: Shireen Mayo MD;  Location: [facility] OR 1ST FLR;  Service: Urology;;    INJECTION OF BOTULINUM TOXIN TYPE A  7/19/2022    Procedure: INJECTION, BOTULINUM TOXIN, 300 units ;  Surgeon: Shireen Mayo MD;  Location: [facility] OR 1ST FLR;  Service: Urology;;    INSERTION, SUPRAPUBIC CATHETER N/A 12/13/2022    Procedure: INSERTION, SUPRAPUBIC CATHETER;  Surgeon: Shireen Mayo MD;  Location: [facility] OR 1ST FLR;  Service: Urology;  Laterality: N/A;  exchange    pain pump placement      SQ Dilaudid Pump managed by  Dr. Hillman, Pain Management    REMOVAL OF BONE SPUR OF FOOT Bilateral 9/16/2022    Procedure: EXCISION ARTHRITIC BONE, BILATERAL FOOT;  Surgeon: Adam Mcguire DPM;  Location: [facility];  Service: Podiatry;  Laterality: Bilateral;    REPLACEMENT OF BACLOFEN PUMP N/A 8/2/2023    Procedure: REPLACEMENT, BACLOFEN PUMP;  Surgeon: Smitha Condon MD;  Location: [facility] OR 2ND FLR;  Service: Neurosurgery;  Laterality: N/A;  Anes: Gen  Blood: Type & Screen  Pos: Left Lat  Intrathecal Pump  Bed: Reg Reversed  Rad: C-arm  Pump: 40 cc, [employer]tron[employer]    REPLACEMENT OF CATHETER N/A 10/31/2019    Procedure: REPLACEMENT, CATHETER-SUPRAPUBIC;  Surgeon: Shireen Mayo MD;  Location: [facility] OR 1ST FLR;  Service: Urology;  Laterality: N/A;    SPINAL CORD STIMULATOR REMOVAL      before [place]    SPINE SURGERY  5-13-13    CERVICAL FUSION    TONSILLECTOMY         FAMILY HISTORY    Family History   Problem Relation Age of Onset    Cancer Mother 55        breast    Cancer Father         esophagus,had laryngectomy    Esophageal cancer Father     Parkinsonism Maternal Grandmother     Tremor Maternal Grandmother     No Known Problems Brother     No Known Problems Brother     Heart disease Maternal Uncle     Colon cancer Maternal Uncle         Less than 60    No Known Problems Sister     No Known Problems Maternal Aunt     Cirrhosis Paternal Aunt         ETOH    Liver disease Paternal Aunt         ETOH    Liver disease Paternal Uncle         ETOH    Cirrhosis Paternal Uncle         ETOH    No Known Problems Maternal Grandfather     No Known Problems Paternal Grandmother     No Known Problems Paternal Grandfather     Melanoma Neg Hx     Amblyopia Neg Hx     Blindness Neg Hx     Cataracts Neg Hx     Diabetes Neg Hx     Glaucoma Neg Hx     Hypertension Neg Hx     Macular degeneration Neg Hx     Retinal detachment Neg Hx     Strabismus Neg Hx     Stroke Neg Hx     Thyroid disease Neg Hx     Celiac disease Neg Hx     Colon polyps Neg Hx      "Cystic fibrosis Neg Hx     Crohn's disease Neg Hx     Inflammatory bowel disease Neg Hx     Liver cancer Neg Hx     Rectal cancer Neg Hx     Stomach cancer Neg Hx     Ulcerative colitis Neg Hx     Lymphoma Neg Hx        SOCIAL HISTORY    Social History     Socioeconomic History    Marital status:    Tobacco Use    Smoking status: Never    Smokeless tobacco: Never   Substance and Sexual Activity    Alcohol use: Never    Drug use: Yes     Types: Marijuana     Comment: "medical marijuana gummies"     Social Determinants of Health     Financial Resource Strain: Low Risk  (9/11/2023)    Overall Financial Resource Strain (CARDIA)     Difficulty of Paying Living Expenses: Not hard at all   Food Insecurity: No Food Insecurity (9/11/2023)    Hunger Vital Sign     Worried About Running Out of Food in the Last Year: Never true     Ran Out of Food in the Last Year: Never true   Transportation Needs: No Transportation Needs (9/11/2023)    PRAPARE - Transportation     Lack of Transportation (Medical): No     Lack of Transportation (Non-Medical): No   Physical Activity: Inactive (9/11/2023)    Exercise Vital Sign     Days of Exercise per Week: 0 days     Minutes of Exercise per Session: 0 min   Stress: Stress Concern Present (9/11/2023)    [employer] - Occupational Stress Questionnaire     Feeling of Stress : To some extent   Social Connections: Moderately Isolated (9/11/2023)    Social Connection and Isolation Panel [NHANES]     Frequency of Communication with Friends and Family: More than three times a week     Frequency of Social Gatherings with Friends and Family: Three times a week     Attends Restoration Services: Never     Active Member of Clubs or Organizations: No     Attends Club or Organization Meetings: Never     Marital Status:    Housing Stability: Low Risk  (9/11/2023)    Housing Stability Vital Sign     Unable to Pay for Housing in the Last Year: No     Number of Places " Lived in the Last Year: 1     Unstable Housing in the Last Year: No       MEDICATIONS  Current Outpatient Medications   Medication Instructions    amoxicillin-clavulanate 875-125mg (AUGMENTIN) 875-125 mg per tablet 1 tablet, Oral, 2 times daily    aspirin (ECOTRIN) 81 mg, Oral, Daily    atorvastatin (LIPITOR) 80 mg, Oral, Daily    butalbital-acetaminophen-caffeine -40 mg (FIORICET, ESGIC) -40 mg per tablet 1 tablet, Oral, Daily PRN    ciprofloxacin HCl (CIPRO) 500 mg, Oral, 2 times daily    clindamycin (CLEOCIN) 300 mg, Oral, Every 8 hours    cyanocobalamin, vitamin B-12, 5,000 mcg Subl 1 tablet, Sublingual, Daily    darifenacin (ENABLEX) 7.5 mg, Oral    diclofenac sodium (VOLTAREN ARTHRITIS PAIN) 4 g, Topical (Top), 4 times daily    docusate sodium (COLACE) 200 mg, Oral, Nightly    ferrous gluconate (FERGON) 324 mg, Oral, With breakfast    fludrocortisone (FLORINEF) 0.1 mg Tab Take a half-tablet (50 mcg) by mouth once daily    FLUoxetine 60 mg, Oral, Daily    fluticasone propionate (FLONASE) 100 mcg, Each Nostril, Daily    furosemide (LASIX) 80 mg, Oral, 2 times daily    HYDROcodone-acetaminophen (NORCO)  mg per tablet 1 tablet, Oral, Every 6 hours PRN    hydrocortisone (CORTEF) 10 mg, Oral, Nightly    hydrOXYzine (ATARAX) 50 mg, Oral, Every 4 hours PRN    intrathecal pain pump compound Hydromorphone (7.5 mg/mL) infusion at 8.59 mg/day (0.3578 mg/hr) out of a total reservoir volume of 40 mL<BR>Pump filled monthly    ketorolac (TORADOL) 10 mg, Oral, Daily PRN    levothyroxine (SYNTHROID) 150 mcg, Oral, Before breakfast    LIDOcaine (LIDODERM) 5 % 1 patch, Transdermal, Daily, Remove & Discard patch within 12 hours or as directed by MD    liothyronine (CYTOMEL) 5 mcg, Oral, Daily    nitroGLYCERIN (NITROSTAT) 0.4 mg, Sublingual, Every 5 min PRN    nystatin (MYCOSTATIN) powder Topical (Top), 4 times daily    ondansetron (ZOFRAN-ODT) 4 mg, Oral, Every 4-6 hours PRN    pantoprazole (PROTONIX) 40 mg,  Oral, Daily    potassium chloride (MICRO-K) 10 MEQ CpSR 20 mEq, Oral, Daily    promethazine (PHENERGAN) 25 mg, Oral, Every 6 hours PRN    QUEtiapine (SEROQUEL) 100 MG Tab TAKE 2 TABLETS (200 MG) BY MOUTH NIGHTLY    senna (SENNA LAX) 8.6 mg tablet 2 tablets, Oral, 2 times daily    tiotropium bromide (SPIRIVA RESPIMAT) 2.5 mcg/actuation inhaler 2 puffs, Inhalation, Daily, Controller    traMADoL (ULTRAM) 50 mg, Oral, Every 4 hours PRN    traZODone (DESYREL) 300 mg, Oral, Nightly       ALLERGIES    Review of patient's allergies indicates:   Allergen Reactions    (d)-limonene flavor      Other reaction(s): difficult intubation  Other reaction(s): Difficulty breathing    Bactrim [sulfamethoxazole-trimethoprim] Anaphylaxis    Benadryl [diphenhydramine hcl] Shortness Of Breath    Fentanyl Itching, Nausea And Vomiting and Swelling             Imitrex [sumatriptan succinate] Shortness Of Breath    Percocet [oxycodone-acetaminophen] Shortness Of Breath    Topamax [topiramate] Shortness Of Breath    Vancomycin Anaphylaxis     Rash    Butorphanol tartrate     Darvocet a500 [propoxyphene n-acetaminophen]      Other reaction(s): Difficulty breathing    Evening primrose (oenothera biennis)     Lyrica [pregabalin] Other (See Comments)     tremors    White petrolatum-zinc     Zinc oxide-white petrolatum      Other reaction(s): Difficulty breathing    Latex, natural rubber Itching and Rash    Phenytoin Rash and Other (See Comments)     Trouble breathing         REVIEW OF SYSTEMS   Review of Systems       PHYSICAL EXAM    Reviewed Triage Note    VITAL SIGNS:   ED Triage Vitals   Enc Vitals Group      BP 11/02/23 1033 120/63      Pulse 11/02/23 1033 72      Resp 11/02/23 1033 20      Temp 11/02/23 1033 98 °F (36.7 °C)      Temp Source 11/02/23 1033 Oral      SpO2 11/02/23 1307 100 %      Weight --       Height --       Head Circumference --       Peak Flow --       Pain Score --       Pain Loc --       Pain Edu? --       Excl. in GC? --         Patient Vitals for the past 24 hrs:   BP Temp Temp src Pulse Resp SpO2   11/02/23 1307 -- -- -- -- -- 100 %   11/02/23 1158 -- -- -- -- 18 --   11/02/23 1033 120/63 98 °F (36.7 °C) Oral 72 20 --       Physical Exam    Nursing note and vitals reviewed.  Constitutional: She appears well-developed and well-nourished.   HENT:   Head: Normocephalic and atraumatic.   Eyes: EOM are normal. Pupils are equal, round, and reactive to light.   Neck: Neck supple.   Normal range of motion.  Cardiovascular:  Normal rate and regular rhythm.           Pulmonary/Chest: Breath sounds normal. No respiratory distress.   Abdominal: Abdomen is soft. There is no abdominal tenderness.   Musculoskeletal:         General: Normal range of motion.      Cervical back: Normal range of motion and neck supple.      Comments: 3+ nonpitting edema to bilateral lower extremities with overlying erythema and mild warmth noted.  No crepitus..  No open skin wounds appreciated to the bilateral lower extremities.  Intact distal pulses bilaterally.     Neurological: She is alert and oriented to person, place, and time.   Skin: Skin is warm and dry.   Psychiatric: She has a normal mood and affect.            EKG     Interpreted by EM physician if performed:   Normal sinus rhythm. No ST elevation or depression.  No T-wave inversions.  Normal intervals.  Rate of 73           LABS  Pertinent labs reviewed. (See chart for details)   Labs Reviewed   CBC W/ AUTO DIFFERENTIAL - Abnormal; Notable for the following components:       Result Value    MCH 26.8 (*)     MCHC 31.0 (*)     All other components within normal limits   COMPREHENSIVE METABOLIC PANEL - Abnormal; Notable for the following components:    Potassium 3.4 (*)     BUN 7 (*)     Alkaline Phosphatase 172 (*)     All other components within normal limits   B-TYPE NATRIURETIC PEPTIDE   TROPONIN I         RADIOLOGY          Imaging Results              X-Ray Chest 1 View (Final result)  Result time  11/02/23 11:13:57      Final result by Enzo Phillips MD (11/02/23 11:13:57)                   Impression:      Mild left basilar atelectasis, unchanged.  Otherwise, no acute radiographic findings in the chest.      Electronically signed by: Enzo Phillips MD  Date:    11/02/2023  Time:    11:13               Narrative:    EXAMINATION:  XR CHEST 1 VIEW    CLINICAL HISTORY:  Shortness of breath    TECHNIQUE:  Single frontal view of the chest was performed.    COMPARISON:  10/04/2023    FINDINGS:  Mild basilar atelectatic changes on the left.  Otherwise,the lungs are clear, with normal appearance of pulmonary vasculature and no pleural effusion or pneumothorax.    The cardiac silhouette is normal in size. The hilar and mediastinal contours are unremarkable.    Visualized osseous structures show no acute abnormalities.  Postop changes of the cervical spine.  There is scoliosis.  Partially visualized spinal stimulator.                                        PROCEDURES    Procedures      ED COURSE & MEDICAL DECISION MAKING    Pertinent Labs & Imaging studies reviewed. (See chart for details and specific orders.)          Summary of review of records:   Does appear she was working with her internal medicine provider to schedule home health.    Has urologic surgery with Dr. Mayo scheduled for November 16th for neurogenic bladder.     Seen for preop screening by Cardiology 2 days ago    Medical Decision Making  Amount and/or Complexity of Data Reviewed  Labs:  Decision-making details documented in ED Course.  Radiology:  Decision-making details documented in ED Course.  ECG/medicine tests:  Decision-making details documented in ED Course.    Risk  Prescription drug management.      Tasha Hawley is a 68 y.o. female presents with worsening pain/swelling to BLE in the settng of chronic lymphedema    Differential includes but is not limited to exacerbation of lymphedema, CHF, less likely cellulitis, DVT, doubt  necrotizing infection    CBC, CMP ordered as well as bnp, trop, cxr, ekg          Medications   furosemide injection 80 mg (80 mg Intravenous Given 11/2/23 1157)   HYDROmorphone injection 0.5 mg (0.5 mg Intravenous Given 11/2/23 1158)       ED Course as of 11/02/23 1325   Thu Nov 02, 2023   1059 EKG 12-lead  Normal sinus rhythm. No ST elevation or depression.  No T-wave inversions.  Normal intervals.  Rate of 73. Overall unchanged from prior, independently interpreted by me.   [HL]   1120 X-Ray Chest 1 View  Mild left basilar atelectasis, unchanged.  Otherwise, no acute radiographic findings in the chest. [HL]   1222 Troponin I: <0.006 [HL]   1222 BNP: <10 [HL]   1222 WBC: 5.53  No leukocytosis [HL]   1223 Comprehensive metabolic panel(!)  No acute abnormalities  [HL]   1259 Findings and plan of care discussed with patient and close friend at bedside.  They states they have impregnated medicated gauze and compression wrapping for home.  Are having home care set up to come out to assist with wound care dressings.  Will also provide referral to wound care clinic in the interim.  Clinically does not show any signs of infection at this time though strict return precautions provided for fevers, worsening pain, swelling or any new symptoms.  Comfortable with plan of discharge home and all questions addressed. [HL]      ED Course User Index  [HL] Prema Lane,              FINAL IMPRESSION    Final diagnoses:  [M79.89] Leg swelling  [I89.0] Lymphedema (Primary)       DISPOSITION  Patient discharged in stable condition        ED Prescriptions    None       Follow-up Information       Follow up With Specialties Details Why Contact Info Additional Information    [facility] Schedule an appointment as soon as possible for a visit   [address] [place] [id number] [phone] Please park in [address] or D and use the [address] entrance. Check in at Medical Office Building [address].    Mesfin Hodges II, MD Internal Medicine Schedule an appointment as soon as possible for a visit   [address]  [place] [address]  [phone]       [facility] Emergency Dept Emergency Medicine  As needed, If symptoms worsen [address]  [facility] [address] [phone]               DISCLAIMER: This note was prepared with [employer] voice recognition transcription software. Garbled syntax, mangled pronouns, and other bizarre constructions may be attributed to that software system.             Prema Lane, DO  11/03/23 1418

## 2023-11-03 ENCOUNTER — TELEPHONE (OUTPATIENT)
Dept: INTERNAL MEDICINE | Facility: CLINIC | Age: 68
End: 2023-11-03
Payer: MEDICARE

## 2023-11-03 ENCOUNTER — TELEPHONE (OUTPATIENT)
Dept: GASTROENTEROLOGY | Facility: CLINIC | Age: 68
End: 2023-11-03
Payer: MEDICARE

## 2023-11-03 ENCOUNTER — TELEPHONE (OUTPATIENT)
Dept: UROLOGY | Facility: CLINIC | Age: 68
End: 2023-11-03
Payer: MEDICARE

## 2023-11-03 DIAGNOSIS — Z93.59 SUPRAPUBIC CATHETER: ICD-10-CM

## 2023-11-03 DIAGNOSIS — I89.0 LYMPHEDEMA: Primary | ICD-10-CM

## 2023-11-03 NOTE — TELEPHONE ENCOUNTER
----- Message from Yesenia Fuentes sent at 11/3/2023 12:58 PM CDT -----  Regarding: Discharge from Home health  Contact: Sammi @ [phone]  Sammi calling from [facility] states pt started outpatient therapy  and can no longer continue to received home health.  Pt's catheter changes also needs to be done in out patient therapy If pt needs home health in the future a new referral needs to be sent..Thanks

## 2023-11-03 NOTE — TELEPHONE ENCOUNTER
I can do the first paragraph, but I don't know enough about the catheter bags to order the right thing.  She needs to reach out to the surgeon's office.    D

## 2023-11-03 NOTE — TELEPHONE ENCOUNTER
Spoke to patient. Advised that the order for [facility] was placed and that they should be calling her soon to set everything up.

## 2023-11-03 NOTE — TELEPHONE ENCOUNTER
----- Message from Toniekp Arredondo sent at 11/3/2023  1:31 PM CDT -----  Contact: Mobile [phone]  Patient would like to be admitted to Home Health Services at [facility] and she also would like to have Occupational therapy there.   Patient also said that she would like to have her feet and her legs wrapped, she said that she have blisters all over them.    Patient would like to put in an order for a Small bench, Catheter bags, patient Catheters sizes is a twenty thirty, Snap box that has the strap and that locks that locks the Catheter in, Erasmo, paper tape, Wound care spray, Coflextlc Sinc (one Box) that comes from a company by the name of [employer], wraps and single hooks for the Catheter bags 2000 ML 2111, and a Catheter clean out tray.

## 2023-11-06 ENCOUNTER — DOCUMENTATION ONLY (OUTPATIENT)
Dept: REHABILITATION | Facility: HOSPITAL | Age: 68
End: 2023-11-06
Payer: MEDICARE

## 2023-11-06 NOTE — PROGRESS NOTES
Pt called in regards to OP therapy. Pt educated that MD has placed orders for HH and so therapy will need to be deferred due to insurance not covering both. Pt displayed good understanding and was encouraged to reach out with any questions or concerns. Pt's visits cancelled.     Jailene Zaman, PT, DPT, CLT

## 2023-11-07 ENCOUNTER — TELEPHONE (OUTPATIENT)
Dept: INTERNAL MEDICINE | Facility: CLINIC | Age: 68
End: 2023-11-07
Payer: MEDICARE

## 2023-11-07 PROCEDURE — G0180 MD CERTIFICATION HHA PATIENT: HCPCS | Mod: ,,, | Performed by: INTERNAL MEDICINE

## 2023-11-07 PROCEDURE — G0180 PR HOME HEALTH MD CERTIFICATION: ICD-10-PCS | Mod: ,,, | Performed by: INTERNAL MEDICINE

## 2023-11-07 NOTE — TELEPHONE ENCOUNTER
----- Message from Cj Castorena RN sent at 11/7/2023  1:22 PM CST -----  Regarding: preop question  Hi, Dr. Hodges. This patient is having cysto w/ botox with Dr. Mayo on 11/14/23 now (orig sched 10/31). She received cardiac clearance via Dr. Lozano from appt on 10/31/23. Just wanted to reach out to you as pcp and see if she was clear in your view.     Thank you,  Cj Castorena RN  [facility]

## 2023-11-07 NOTE — ANESTHESIA PAT ROS NOTE
11/07/2023  Tasha Hawley is a 68 y.o., female.      Pre-op Assessment          Review of Systems  Anesthesia Hx:  No problems with previous Anesthesia  History of prior surgery of interest to airway management or planning: cervical fusion. Previous anesthesia: MAC  6/23/23-EGD with MAC.  Procedure performed at an [facility]. Denies Family Hx of Anesthesia complications.   Denies Personal Hx of Anesthesia complications.   Social:  Non-Smoker, No Alcohol Use    Hematology/Oncology:     Oncology Normal    -- Anemia: Iron Deficiency Anemia  -- Transfusion: -- Transfusion Hx (no complications):  Hematology Comments: PATIENT RECEIVING IRON INFUSIONS (LAST INFUSION 7/5/23)    EENT/Dental:   Ears General/Symptom(s) Hearing Impairment: deaf- right [place]-LEFT, DOES NOT WEAR HEARING AIDS   Cardiovascular:   CAD   Angina, with exertion CHF hyperlipidemia EF 65% Functional Capacity 2 METS, USES WHEELCHAIR/ROLLATOR  Carotoid Artery Disease    Pulmonary:   COPD, moderate Shortness of breath RECENT PNEUMONIA-FEW MONTHS AGO Pulmonary Symptoms:  are dependence on supplemental O2.  Dependence on O2 is PRN with activity  Chronic Obstructive Pulmonary Disease (COPD): Emphysema Hospitalization required in the past year. Inhaler use is maintenance inhaler daily and maintenance inhaler PRN.  Obstructive Sleep Apnea (CECI), CPAP prescribed.   Renal/:   Chronic Renal Disease, CKD  Devices/Procedures/Surgery, suprapubic catheter. Kidney Function/Disease, Chronic Kidney Disease (CKD) , CKD Stage III (GFR 30-59)    Hepatic/GI:   GERD NEUROMUSCULAR DYSFUNCTION OF THE BLADDER   Musculoskeletal:   BACK SURGERIES X 12, SEVERAL LUMBAR SURGERIES AND H/O CERVICAL FUSION, CHRONIC NECK/BACK PAIN Joint Disease:  Arthritis, Osteoarthritis  Cervical Spine Disorder, S/P Cervical Fusion    Neurological:   LUMBAR RADICULOPATHY Dx of  Headaches, Migraine Headache Pain Syndrome  Chronic Pain Syndrome Osteoarthritis    Endocrine:   Hypothyroidism  Obesity / BMI > 30  Psych:   anxiety depression           Anesthesia Assessment: Preoperative EQUATION    Planned Procedure: Procedure(s) (LRB):  CYSTOSCOPY,WITH BOTULINUM TOXIN INJECTION (N/A)  Requested Anesthesia Type:General/MAC  Surgeon: Shireen Mayo MD  Service: Urology  Known or anticipated Date of Surgery:10/31/2023 SURGERY MOVED TO 11/14/23     Surgeon notes: reviewed    Electronic QUestionnaire Assessment completed via nurse interview with patient.        Triage considerations:     The patient has no apparent active cardiac condition (No unstable coronary Syndrome such as severe unstable angina or recent [<1 month] myocardial infarction, decompensated CHF, severe valvular   disease or significant arrhythmia)    Previous anesthesia records:GETA and No problems  08/02/23 baclofen pump replacement  Airway:  Mouth Opening: Normal  TM Distance: Normal  Tongue: Normal  Neck ROM: Extension Decreased, Flexion Decreased, Right Lateral Motion Decreased, Left Lateral Motion Decreased      Last PCP note: within 3 months , within [facility]   Subspecialty notes: Cardiology: General, Gastroenterology, Neurosurgery, Pulmonary, Urology    Other important co-morbidities: CHF, COPD, GERD, Hypothyroid, Morbid Obesity, CECI, and cervical fusion, arthritis       Tests already available:  Available tests,  within 1 month , within [facility] .   10/23 tsh  10/4 cxr, ekg, cmp, cbc  08/21 echo (65%)            Instructions given. (See in Nurse's note)    Optimization:  Anesthesia Preop Clinic Assessment  Indicated: not indicated    Medical Opinion Indicated           Plan:    Testing:  None Needed         Consultation: cardiology per Dr Lozano     Patient  has previously scheduled Medical Appointment:none    Navigation:           Consults scheduled.           Results will be tracked by Preop Clinic.     11/07/23 Pt was  seen by Dr. Lozano on 10/31/23. Clearance as noted- The pt is at an acceptable risk from a cardiac perspective for upcoming non cardiac procedure         .

## 2023-11-11 ENCOUNTER — EXTERNAL HOME HEALTH (OUTPATIENT)
Dept: HOME HEALTH SERVICES | Facility: HOSPITAL | Age: 68
End: 2023-11-11
Payer: MEDICARE

## 2023-11-13 ENCOUNTER — TELEPHONE (OUTPATIENT)
Dept: UROLOGY | Facility: CLINIC | Age: 68
End: 2023-11-13
Payer: MEDICARE

## 2023-11-13 ENCOUNTER — TELEPHONE (OUTPATIENT)
Dept: INTERNAL MEDICINE | Facility: CLINIC | Age: 68
End: 2023-11-13
Payer: MEDICARE

## 2023-11-13 NOTE — TELEPHONE ENCOUNTER
----- Message from Cat Garcia sent at 11/13/2023  8:18 AM CST -----  Contact: [phone]  [email or web address]     Patient is calling for Medical Advice regarding:swelling in legs/feet, patient states that she is in a lot of pain    How long has patient had these symptoms:started last week but getting worse    Patient was offered an appointment today and tomorrow - she declined    Would like response via mychart:  phone    Comments:

## 2023-11-13 NOTE — TELEPHONE ENCOUNTER
You are scheduled for surgery with  on 11-. Your arrival time is 745am. You are to report to the [facility] located in the back of the [facility] on the [place] side of the building right behind [facility]. You will need someone to drive you home following your surgery. No ASPRIN Related Products 7 days prior to surgery. No alcohol 24 hours before and after and no smoking a few days prior. NOTHING TO EAT OR DRINK AFTER MIDNIGHT THE NIGHT PRIOR TO THE SURGERY INCLUDING GUM, CANDY AND MINTS. Take a shower the night before and the morning of with Hibiclens Antibacterial soap and no lotion, cologne, deodorant or powder in the morning.

## 2023-11-14 ENCOUNTER — ANESTHESIA (OUTPATIENT)
Dept: SURGERY | Facility: HOSPITAL | Age: 68
End: 2023-11-14
Payer: MEDICARE

## 2023-11-14 ENCOUNTER — TELEPHONE (OUTPATIENT)
Dept: INTERNAL MEDICINE | Facility: CLINIC | Age: 68
End: 2023-11-14
Payer: MEDICARE

## 2023-11-14 ENCOUNTER — HOSPITAL ENCOUNTER (OUTPATIENT)
Facility: HOSPITAL | Age: 68
Discharge: HOME | End: 2023-11-14
Attending: UROLOGY | Admitting: UROLOGY
Payer: MEDICARE

## 2023-11-14 ENCOUNTER — ANESTHESIA EVENT (OUTPATIENT)
Dept: SURGERY | Facility: HOSPITAL | Age: 68
End: 2023-11-14
Payer: MEDICARE

## 2023-11-14 VITALS
HEART RATE: 66 BPM | RESPIRATION RATE: 16 BRPM | SYSTOLIC BLOOD PRESSURE: 138 MMHG | WEIGHT: 234.56 LBS | HEIGHT: 64 IN | OXYGEN SATURATION: 97 % | DIASTOLIC BLOOD PRESSURE: 86 MMHG | TEMPERATURE: 98 F | BODY MASS INDEX: 40.04 KG/M2

## 2023-11-14 DIAGNOSIS — N32.81 OAB (OVERACTIVE BLADDER): ICD-10-CM

## 2023-11-14 DIAGNOSIS — M79.671 PAIN IN RIGHT FOOT: ICD-10-CM

## 2023-11-14 PROCEDURE — 51705 CHANGE OF BLADDER TUBE: CPT | Mod: 51,,, | Performed by: UROLOGY

## 2023-11-14 PROCEDURE — 63600175 PHARM REV CODE 636 W HCPCS

## 2023-11-14 PROCEDURE — 71000044 HC DOSC ROUTINE RECOVERY FIRST HOUR: Performed by: UROLOGY

## 2023-11-14 PROCEDURE — 37000008 HC ANESTHESIA 1ST 15 MINUTES: Performed by: UROLOGY

## 2023-11-14 PROCEDURE — 52287 PR CYSTOURETHROSCOPY WITH INJ FOR CHEMODENERVATION: ICD-10-PCS | Mod: 59,,, | Performed by: UROLOGY

## 2023-11-14 PROCEDURE — D9220A PRA ANESTHESIA: ICD-10-PCS | Mod: CRNA,,, | Performed by: NURSE ANESTHETIST, CERTIFIED REGISTERED

## 2023-11-14 PROCEDURE — 51715 ENDOSCOPIC INJECTION/IMPLANT: CPT | Mod: ,,, | Performed by: UROLOGY

## 2023-11-14 PROCEDURE — 25000003 PHARM REV CODE 250: Performed by: NURSE ANESTHETIST, CERTIFIED REGISTERED

## 2023-11-14 PROCEDURE — D9220A PRA ANESTHESIA: Mod: ANES,,, | Performed by: ANESTHESIOLOGY

## 2023-11-14 PROCEDURE — D9220A PRA ANESTHESIA: ICD-10-PCS | Mod: ANES,,, | Performed by: ANESTHESIOLOGY

## 2023-11-14 PROCEDURE — 51705 PR CHANGE OF BLADDER TUBE,SIMPLE: ICD-10-PCS | Mod: 51,,, | Performed by: UROLOGY

## 2023-11-14 PROCEDURE — 71000015 HC POSTOP RECOV 1ST HR: Performed by: UROLOGY

## 2023-11-14 PROCEDURE — 36000706: Performed by: UROLOGY

## 2023-11-14 PROCEDURE — L8606 SYNTHETIC IMPLNT URINARY 1ML: HCPCS | Performed by: UROLOGY

## 2023-11-14 PROCEDURE — D9220A PRA ANESTHESIA: Mod: CRNA,,, | Performed by: NURSE ANESTHETIST, CERTIFIED REGISTERED

## 2023-11-14 PROCEDURE — 36000707: Performed by: UROLOGY

## 2023-11-14 PROCEDURE — 37000009 HC ANESTHESIA EA ADD 15 MINS: Performed by: UROLOGY

## 2023-11-14 PROCEDURE — 52287 CYSTOSCOPY CHEMODENERVATION: CPT | Mod: 59,,, | Performed by: UROLOGY

## 2023-11-14 PROCEDURE — 51715 PR ENDOSCOPIC INJECTION/IMPLANT: ICD-10-PCS | Mod: ,,, | Performed by: UROLOGY

## 2023-11-14 PROCEDURE — 63600175 PHARM REV CODE 636 W HCPCS: Performed by: NURSE ANESTHETIST, CERTIFIED REGISTERED

## 2023-11-14 RX ORDER — CIPROFLOXACIN 2 MG/ML
400 INJECTION, SOLUTION INTRAVENOUS
Status: COMPLETED | OUTPATIENT
Start: 2023-11-14 | End: 2023-11-14

## 2023-11-14 RX ORDER — MIDAZOLAM HYDROCHLORIDE 1 MG/ML
INJECTION, SOLUTION INTRAMUSCULAR; INTRAVENOUS
Status: DISCONTINUED | OUTPATIENT
Start: 2023-11-14 | End: 2023-11-14

## 2023-11-14 RX ORDER — KETAMINE HCL IN 0.9 % NACL 50 MG/5 ML
SYRINGE (ML) INTRAVENOUS
Status: DISCONTINUED | OUTPATIENT
Start: 2023-11-14 | End: 2023-11-14

## 2023-11-14 RX ORDER — CIPROFLOXACIN 500 MG/1
500 TABLET ORAL 2 TIMES DAILY
Qty: 6 TABLET | Refills: 0 | Status: SHIPPED | OUTPATIENT
Start: 2023-11-14 | End: 2023-11-17

## 2023-11-14 RX ORDER — ONDANSETRON 2 MG/ML
INJECTION INTRAMUSCULAR; INTRAVENOUS
Status: DISCONTINUED | OUTPATIENT
Start: 2023-11-14 | End: 2023-11-14

## 2023-11-14 RX ORDER — LIDOCAINE HYDROCHLORIDE 20 MG/ML
INJECTION INTRAVENOUS
Status: DISCONTINUED | OUTPATIENT
Start: 2023-11-14 | End: 2023-11-14

## 2023-11-14 RX ORDER — ONDANSETRON 2 MG/ML
4 INJECTION INTRAMUSCULAR; INTRAVENOUS DAILY PRN
Status: DISCONTINUED | OUTPATIENT
Start: 2023-11-14 | End: 2023-11-14 | Stop reason: HOSPADM

## 2023-11-14 RX ORDER — PROPOFOL 10 MG/ML
VIAL (ML) INTRAVENOUS CONTINUOUS PRN
Status: DISCONTINUED | OUTPATIENT
Start: 2023-11-14 | End: 2023-11-14

## 2023-11-14 RX ORDER — SODIUM CHLORIDE 0.9 % (FLUSH) 0.9 %
3 SYRINGE (ML) INJECTION EVERY 30 MIN PRN
Status: DISCONTINUED | OUTPATIENT
Start: 2023-11-14 | End: 2023-11-14 | Stop reason: HOSPADM

## 2023-11-14 RX ADMIN — LIDOCAINE HYDROCHLORIDE 20 MG: 20 INJECTION INTRAVENOUS at 09:11

## 2023-11-14 RX ADMIN — PROPOFOL 30 MG: 10 INJECTION, EMULSION INTRAVENOUS at 09:11

## 2023-11-14 RX ADMIN — CIPROFLOXACIN 400 MG: 2 INJECTION, SOLUTION INTRAVENOUS at 09:11

## 2023-11-14 RX ADMIN — MIDAZOLAM HYDROCHLORIDE 0.5 MG: 1 INJECTION, SOLUTION INTRAMUSCULAR; INTRAVENOUS at 10:11

## 2023-11-14 RX ADMIN — Medication 20 MG: at 10:11

## 2023-11-14 RX ADMIN — ONDANSETRON 4 MG: 2 INJECTION INTRAMUSCULAR; INTRAVENOUS at 10:11

## 2023-11-14 RX ADMIN — SODIUM CHLORIDE: 0.9 INJECTION, SOLUTION INTRAVENOUS at 09:11

## 2023-11-14 RX ADMIN — PROPOFOL 100 MCG/KG/MIN: 10 INJECTION, EMULSION INTRAVENOUS at 09:11

## 2023-11-14 NOTE — OP NOTE
[facility]  Operative Note    Date: 11/14/2023    Pre-Op Diagnosis:   - neurogenic bladder   - stress urinary incontinence  Patient Active Problem List    Diagnosis Date Noted    Lymphedema 10/31/2023    Chronic venous hypertension (idiopathic) with ulcer and inflammation of bilateral lower extremity 09/22/2023    Displaced fracture of proximal phalanx of right great toe, initial encounter for closed fracture 09/13/2023    Idiopathic hypotension 09/12/2023    Fracture, phalanx, foot 09/09/2023    Coronary artery disease involving native coronary artery of native heart with angina pectoris 08/11/2023    Migraine 07/24/2023    Hypothyroid 07/24/2023    History of staph infection 07/24/2023    Chronic, continuous use of opioids 07/24/2023    Chronic systolic heart failure 07/18/2023    Acute on chronic respiratory failure with hypoxia and hypercapnia 07/18/2023    Pulmonary hypertension due to diastolic systemic ventricular dysfunction 07/18/2023    Pulmonary air trapping 07/18/2023    Intractable pain 05/25/2023    BMI 36.0-36.9,adult 05/22/2023    Stage 3a chronic kidney disease 05/22/2023    Preoperative respiratory examination 03/29/2023    Anemia 01/17/2023    Hypokalemia 01/09/2023    Calcaneal spur of left foot 09/17/2022    Charcot ankle, unspecified laterality 09/17/2022    Anxiety 02/05/2022    Cellulitis 07/07/2021    UTI (urinary tract infection) 06/03/2021    Tremor     Physical deconditioning 01/13/2021    Leg pain, bilateral 02/06/2020    Constipation due to opioid therapy 01/30/2020    Pure hypercholesterolemia 01/13/2020    Chronic diastolic heart failure 01/13/2020    Mixed stress and urge urinary incontinence 05/13/2019    Recurrent UTI 06/08/2018    Esophageal dysphagia 05/23/2018    Suprapubic catheter 02/01/2018    Insomnia due to medical condition 12/08/2017    Bradycardia 11/14/2017    Bilateral carotid artery disease 11/14/2017    Oropharyngeal dysphagia 07/21/2017     Scoliosis deformity of spine 03/31/2017    S/P insertion of intrathecal pump 03/31/2017    Paresthesia of both lower extremities 03/31/2017    Degenerative disc disease, lumbar 03/31/2017    Osteoarthritis of spine with radiculopathy, lumbar region 03/31/2017    Lymphedema of both lower extremities 03/02/2017    Hypothyroidism (acquired) 02/02/2017    Frequent falls 02/01/2017    Neurogenic bladder 09/27/2016    Drug-induced constipation 08/16/2016    GERD (gastroesophageal reflux disease) 08/16/2016    History of stress test 08/16/2016    Narcotic dependency, continuous 07/18/2014    Congestive heart failure 07/18/2014    Thoracic aorta atherosclerosis 07/18/2014    SOB (shortness of breath) 05/28/2014    Cervical myelopathy 05/13/2013    Chronic pain syndrome 05/01/2013    Major depressive disorder, recurrent, mild 02/21/2013    Generalized anxiety disorder     Sleep apnea     Iron deficiency anemia        Post-Op Diagnosis: same    Procedure(s) Performed:   1.  Cystoscopy with bladder botox injection  2.  Cystoscopy with urethral bulking agent    Specimen(s): none    Staff Surgeon:  Shireen Mayo MD     Assistant Surgeon: Violeta Osorio MD; Cezar Marcelo MD     Anesthesia: Monitored Local Anesthesia with Sedation    Indications: Tasha Hawley is a 68 y.o. female with neurogenic bladder. She presents today for cystoscopy with bladder botox injection, Bulkamid injection, and suprapubic catheter exchange.    Findings:   300 U of botox injected into the bladder in 15 ccs   Bulkamid injected with good coaptation of the urethra   SP tube exchanged in standard fashion    Estimated Blood Loss: min    Drains:   20 Fr silicone suprapubic tube, 30 ccs in balloon     Procedure in Detail:  After informed consent was obtained the patient was brought to the cystoscopy suite and placed in the supine position.  SCDs were applied and working.  Anesthesia was administered.  When the patient was adequately sedated she was placed in  the dorsal lithotomy position and prepped and draped in the usual sterile fashion.      A rigid cystoscope in a 22 Fr sheath was introduced into the patients's bladder via the urethra. This passed easily. Formal cystoscopy was performed which revealed the ureteral orifices in their normal anatomic position bilaterally. No bladder masses, trabeculations, stones or diverticula were seen.      300 units of botox in 15 ccs was injected into the detrusor muscle throughout the bladder. Good wheals were raised. The patient's bladder was drained.    The short 0 degree cystoscope lens in the disposable Bulkamid injection sheath was then inserted into the urethra and advanced into the urinary bladder. The rigid needle was inserted into the scope and the scope was backed out into the urethra. Keeping the needle parallel to the urethra, the needle was inserted into the submucosa. Bulkamid was injected at the 5 and 7 o'clock regions transurethrally. This was repeated at the 10 and 2 o'clock positions. Good coaptation of the urethra was seen after injection. The scope was removed from the bladder.    The patient's suprapubic tube was then exchanged in standard fashion for a 20 Fr silicone catheter.  30 ccs of sterile water were placed in the balloon.      The patient tolerated the procedure well and was transferred to the recovery room in stable condition.      Disposition:  The patient will follow up with Dr. Mayo in 2 weeks.  She will continue cipro.      MD ANH De Leon was present for the entire case and agree with the above note.

## 2023-11-14 NOTE — TRANSFER OF CARE
"Anesthesia Transfer of Care Note    Patient: Tasha Hawley    Procedure(s) Performed: Procedure(s) (LRB):  INJECTION, BOTULINUM TOXIN, TYPE A (N/A)  INSERTION, SUPRAPUBIC CATHETER (N/A)  CYSTOSCOPY, WITH PERIURETHRAL BULKING AGENT INJECTION (N/A)    Patient location: PACU    Anesthesia Type: general    Transport from OR: Transported from OR on 6-10 L/min O2 by face mask with adequate spontaneous ventilation    Post pain: adequate analgesia    Post assessment: no apparent anesthetic complications    Post vital signs: stable    Level of consciousness: sedated    Nausea/Vomiting: no nausea/vomiting    Complications: none    Transfer of care protocol was followed    Last vitals: Visit Vitals  /62 (BP Location: Left arm, Patient Position: Lying)   Pulse 66   Temp 36.7 °C (98.1 °F) (Temporal)   Resp 16   Ht 5' 4" (1.626 m)   Wt 106.4 kg (234 lb 9.1 oz)   LMP  (LMP Unknown)   SpO2 (!) 94%   Breastfeeding No   BMI 40.26 kg/m²     "

## 2023-11-14 NOTE — DISCHARGE INSTRUCTIONS
Post Cystoscopy Instructions  Do not strain to have a bowel movement  No strenuous exercise x 7 days    You can expect:  To see some blood in your urine     If you have a catheter, please return to the ER if your catheter stops draining or you are having abdominal pain.    Call the doctor if:  Temperature is greater than 101F  Persistent vomiting and inability to keep food down

## 2023-11-14 NOTE — TELEPHONE ENCOUNTER
----- Message from Jeanna Skelton sent at 11/13/2023  3:32 PM CST -----  Contact: [email or web address] [phone]  [email or web address]     Patient is calling for Medical Advice regarding:    How long has patient had these symptoms:    Pharmacy name and phone#:    Would like response via VisualSharet: call back    Comments: Lidya mike/Angie  is calling because pt had 2 falls over the weekend due to weakness and her collarbone is sore, and is checking if she can do lymphedema checks in an outpatient setting

## 2023-11-14 NOTE — ANESTHESIA POSTPROCEDURE EVALUATION
Anesthesia Post Evaluation    Patient: Tasha Hawley    Procedure(s) Performed: Procedure(s) (LRB):  INJECTION, BOTULINUM TOXIN, TYPE A (N/A)  INSERTION, SUPRAPUBIC CATHETER (N/A)  CYSTOSCOPY, WITH PERIURETHRAL BULKING AGENT INJECTION (N/A)    Final Anesthesia Type: general      Patient location during evaluation: PACU  Patient participation: Yes- Able to Participate  Level of consciousness: awake and alert  Post-procedure vital signs: reviewed and stable  Pain management: adequate  Airway patency: patent    PONV status at discharge: No PONV  Anesthetic complications: no      Cardiovascular status: blood pressure returned to baseline  Respiratory status: unassisted, room air and spontaneous ventilation  Hydration status: euvolemic  Follow-up not needed.          Vitals Value Taken Time   /86 11/14/23 1146   Temp 36.5 °C (97.7 °F) 11/14/23 1103   Pulse 64 11/14/23 1244   Resp 15 11/14/23 1103   SpO2 100 % 11/14/23 1244   Vitals shown include unvalidated device data.      No case tracking events are documented in the log.      Pain/Low Score: Low Score: 7 (11/14/2023 11:03 AM)          
Self

## 2023-11-14 NOTE — INTERVAL H&P NOTE
The patient has been examined and the H&P has been reviewed:    I concur with the findings and no changes have occurred since H&P was written.    Surgery risks, benefits and alternative options discussed and understood by patient/family.    Has been on cipro, last dose last night.  Urine aspirated from SP tube negative nitrites, negative leuks.      OR today for botox, 300U.        There are no hospital problems to display for this patient.    Patient seen in holding.  No changes in clinical condition.  Proceed with planned procedure.

## 2023-11-14 NOTE — ANESTHESIA PREPROCEDURE EVALUATION
11/14/2023  Tasha Hawley is a 68 y.o., female.    Past Medical History:   Diagnosis Date    Anxiety     Arthritis     Bilateral lower extremity edema     severe chronic    Carotid artery occlusion     Cataract     CHF (congestive heart failure)     Coronary artery disease     subtotalled LAD with collateral    Depression     Fever blister     Hard of hearing     Hypokalemia 01/09/2023    Hyponatremia 02/04/2022    Hypothyroid     Iron deficiency anemia     Lumbar radiculopathy     with chronic pain    Ocular migraine     Other emphysema 05/22/2023    Renal disorder     Sleep apnea     cpap       Past Surgical History:   Procedure Laterality Date    ADENOIDECTOMY      BACK SURGERY      x 12    CARDIAC CATHETERIZATION  2016    subtotalled LAD with right to left collaterals    CATARACT EXTRACTION W/  INTRAOCULAR LENS IMPLANT Left     Dr Coleman     CYSTOSCOPIC LITHOLAPAXY N/A 6/27/2019    Procedure: CYSTOLITHOLAPAXY;  Surgeon: Shireen Mayo MD;  [facility];  Service: Urology;  Laterality: N/A;    CYSTOSCOPIC LITHOLAPAXY N/A 9/3/2019    Procedure: CYSTOLITHOLAPAXY;  Surgeon: Shireen Mayo MD;  [facility];  Service: Urology;  Laterality: N/A;    CYSTOSCOPY N/A 7/13/2021    Procedure: CYSTOSCOPY;  Surgeon: Shireen Mayo MD;  [facility];  Service: Urology;  Laterality: N/A;    CYSTOSCOPY  11/16/2021    Procedure: CYSTOSCOPY;  Surgeon: Shireen Mayo MD;  Location: [facility] OR [address] [place] [address];  Service: Urology;;    CYSTOSCOPY  7/19/2022    Procedure: CYSTOSCOPY;  Surgeon: Shireen Mayo MD;  [facility];  Service: Urology;;    CYSTOSCOPY WITH INJECTION OF PERIURETHRAL BULKING AGENT  7/19/2022    Procedure: CYSTOSCOPY, WITH PERIURETHRAL BULKING AGENT INJECTION-MACROPLASTIQUE;  Surgeon: Shireen Mayo MD;  Location: [facility] OR [address] [place] [address];  Service:  Urology;;    CYSTOSCOPY WITH INJECTION OF PERIURETHRAL BULKING AGENT N/A 3/28/2023    Procedure: CYSTOSCOPY, WITH PERIURETHRAL BULKING AGENT INJECTION;  Surgeon: Shireen Mayo MD;  Location: [facility] OR 1ST FLR;  Service: Urology;  Laterality: N/A;  Bulkamid    CYSTOSCOPY,WITH BOTULINUM TOXIN INJECTION N/A 12/13/2022    Procedure: CYSTOSCOPY,WITH BOTULINUM TOXIN INJECTION;  Surgeon: Shireen Mayo MD;  [facility];  Service: Urology;  Laterality: N/A;  300 U    CYSTOSCOPY,WITH BOTULINUM TOXIN INJECTION N/A 3/28/2023    Procedure: CYSTOSCOPY,WITH BOTULINUM TOXIN INJECTION;  Surgeon: Shireen Mayo MD;  Location: [facility] OR 1ST FLR;  Service: Urology;  Laterality: N/A;  45 MIN.    300 UNITS    ESOPHAGOGASTRODUODENOSCOPY N/A 5/23/2018    Procedure: ESOPHAGOGASTRODUODENOSCOPY (EGD);  Surgeon: rPince Vance MD;  Location: [facility] (4TH FLR);  Service: Endoscopy;  Laterality: N/A;  r/s 'd per Dr. Vance due to family emergency- ER    ESOPHAGOGASTRODUODENOSCOPY N/A 6/23/2023    Procedure: EGD (ESOPHAGOGASTRODUODENOSCOPY);  Surgeon: Juaquin Barry MD;  Location: [facility] (2ND FLR);  Service: Endoscopy;  Laterality: N/A;  Refferal: PRINCE VANCE  Order  tele enc 5/18/23  dx: history of a Nissen fundoplication  Additional Scheduling Information:  On home oxygen 2nd floor for airway protection also with a pain pump elevated BMI close to 40   Prep Gastroparesis   ins    HYSTERECTOMY  1975    endometriosis    INJECTION OF BOTULINUM TOXIN TYPE A  7/13/2021    Procedure: INJECTION, BOTULINUM TOXIN, 200units;  Surgeon: Shireen Mayo MD;  [facility];  Service: Urology;;    INJECTION OF BOTULINUM TOXIN TYPE A  11/16/2021    Procedure: INJECTION, BOTULINUM TOXIN, 200units;  Surgeon: Shireen Mayo MD;  [facility];  Service: Urology;;    INJECTION OF BOTULINUM TOXIN TYPE A  7/19/2022    Procedure: INJECTION, BOTULINUM TOXIN, 300 units ;  Surgeon: Shireen Mayo,  MD;  Location: [facility] OR [address] [place] [address];  Service: Urology;;    INSERTION, SUPRAPUBIC CATHETER N/A 12/13/2022    Procedure: INSERTION, SUPRAPUBIC CATHETER;  Surgeon: Shireen Mayo MD;  Location: [facility] OR [address] [place] [address];  Service: Urology;  Laterality: N/A;  exchange    pain pump placement      SQ Dilaudid Pump managed by Dr. Hillman, Pain Management    REMOVAL OF BONE SPUR OF FOOT Bilateral 9/16/2022    Procedure: EXCISION ARTHRITIC BONE, BILATERAL FOOT;  Surgeon: Adam Mcguire DPM;  Location: [facility];  Service: Podiatry;  Laterality: Bilateral;    REPLACEMENT OF BACLOFEN PUMP N/A 8/2/2023    Procedure: REPLACEMENT, BACLOFEN PUMP;  Surgeon: Smitha Condon MD;  Location: [facility] OR [address] [place] [address];  Service: Neurosurgery;  Laterality: N/A;  Anes: Gen  Blood: Type & Screen  Pos: Left Lat  Intrathecal Pump  Bed: Reg Reversed  Rad: C-arm  Pump: 40 cc, [employer]s    REPLACEMENT OF CATHETER N/A 10/31/2019    Procedure: REPLACEMENT, CATHETER-SUPRAPUBIC;  Surgeon: Shireen Mayo MD;  Location: [facility] OR [address] [place] [address];  Service: Urology;  Laterality: N/A;    SPINAL CORD STIMULATOR REMOVAL      before [place]    SPINE SURGERY  5-13-13    CERVICAL FUSION    TONSILLECTOMY           Pre-op Assessment          Review of Systems  Anesthesia Hx:  No problems with previous Anesthesia   History of prior surgery of interest to airway management or planning:          Denies Family Hx of Anesthesia complications.    Denies Personal Hx of Anesthesia complications.                    Cardiovascular:            Denies Angina.       Denies JONES.                            Pulmonary:      Shortness of breath  Sleep Apnea                Hepatic/GI:     GERD (no symptoms currently)                 Physical Exam  General: Oriented, Well nourished and Somnolent  Somnolent but arousable  Airway:  Mallampati: II   Mouth Opening: Normal  TM Distance: Normal  Neck ROM: Normal ROM    Dental:  Edentulous    Chest/Lungs:  Normal Respiratory Rate    Heart:  Rate:  Normal  Rhythm: Regular Rhythm        Anesthesia Plan  Type of Anesthesia, risks & benefits discussed:    Anesthesia Type: MAC, Gen Natural Airway  Intra-op Monitoring Plan: Standard ASA Monitors  Post Op Pain Control Plan: multimodal analgesia and IV/PO Opioids PRN  Induction:  IV  Informed Consent: Informed consent signed with the Patient and all parties understand the risks and agree with anesthesia plan.  All questions answered.   ASA Score: 3  Day of Surgery Review of History & Physical: H&P Update referred to the surgeon/provider.    Ready For Surgery From Anesthesia Perspective.     .

## 2023-11-14 NOTE — DISCHARGE SUMMARY
[facility] - [facility] (1st Fl)  Discharge Note  Short Stay    Procedure(s) (LRB):  INJECTION, BOTULINUM TOXIN, TYPE A (N/A)  INSERTION, SUPRAPUBIC CATHETER (N/A)  CYSTOSCOPY, WITH PERIURETHRAL BULKING AGENT INJECTION (N/A)      OUTCOME: Patient tolerated treatment/procedure well without complication and is now ready for discharge.    DISPOSITION: Home or Self Care    FINAL DIAGNOSIS:  neurogenic bladder, stress incontinence    FOLLOWUP: In clinic    TIME SPENT ON DISCHARGE: 5 minutes    As above.

## 2023-11-15 ENCOUNTER — TELEPHONE (OUTPATIENT)
Dept: INTERNAL MEDICINE | Facility: CLINIC | Age: 68
End: 2023-11-15
Payer: MEDICARE

## 2023-11-15 NOTE — TELEPHONE ENCOUNTER
Spoke to patient she states her legs are very swollen and has sores. I advised patient to call  nurse to ask if she should wrap her legs or not because Dr. Hodges isn't in the office. Patient has an appointment with Cat Pierce on 11/16. Advised patient to come to that appointment so we can see the swelling.

## 2023-11-15 NOTE — TELEPHONE ENCOUNTER
----- Message from Feng Magdaleno sent at 11/15/2023 11:57 AM CST -----  Contact: self [phone]  Per pt requesting a call stated suffering with lipedema stated do she need to wrap it or kepp uncovered.    Please call and advise

## 2023-11-16 ENCOUNTER — LAB VISIT (OUTPATIENT)
Dept: LAB | Facility: HOSPITAL | Age: 68
End: 2023-11-16
Payer: MEDICARE

## 2023-11-16 ENCOUNTER — TELEPHONE (OUTPATIENT)
Dept: WOUND CARE | Facility: CLINIC | Age: 68
End: 2023-11-16
Payer: MEDICARE

## 2023-11-16 ENCOUNTER — TELEPHONE (OUTPATIENT)
Dept: INTERNAL MEDICINE | Facility: CLINIC | Age: 68
End: 2023-11-16
Payer: MEDICARE

## 2023-11-16 ENCOUNTER — OFFICE VISIT (OUTPATIENT)
Dept: INTERNAL MEDICINE | Facility: CLINIC | Age: 68
End: 2023-11-16
Payer: MEDICARE

## 2023-11-16 VITALS
BODY MASS INDEX: 41.52 KG/M2 | WEIGHT: 243.19 LBS | OXYGEN SATURATION: 92 % | DIASTOLIC BLOOD PRESSURE: 60 MMHG | SYSTOLIC BLOOD PRESSURE: 106 MMHG | HEART RATE: 74 BPM | HEIGHT: 64 IN

## 2023-11-16 DIAGNOSIS — I89.0 LYMPHEDEMA OF BOTH LOWER EXTREMITIES: ICD-10-CM

## 2023-11-16 DIAGNOSIS — L97.909 VENOUS ULCER: ICD-10-CM

## 2023-11-16 DIAGNOSIS — F11.20 NARCOTIC DEPENDENCY, CONTINUOUS: Chronic | ICD-10-CM

## 2023-11-16 DIAGNOSIS — I50.22 CHRONIC SYSTOLIC HEART FAILURE: ICD-10-CM

## 2023-11-16 DIAGNOSIS — I50.32 CHRONIC DIASTOLIC HEART FAILURE: ICD-10-CM

## 2023-11-16 DIAGNOSIS — G89.4 CHRONIC PAIN SYNDROME: Chronic | ICD-10-CM

## 2023-11-16 DIAGNOSIS — Z98.890 S/P INSERTION OF INTRATHECAL PUMP: Chronic | ICD-10-CM

## 2023-11-16 DIAGNOSIS — I89.0 LYMPHEDEMA: ICD-10-CM

## 2023-11-16 DIAGNOSIS — M47.26 OSTEOARTHRITIS OF SPINE WITH RADICULOPATHY, LUMBAR REGION: Chronic | ICD-10-CM

## 2023-11-16 DIAGNOSIS — G95.9 CERVICAL MYELOPATHY: ICD-10-CM

## 2023-11-16 DIAGNOSIS — R20.2 PARESTHESIA OF BOTH LOWER EXTREMITIES: ICD-10-CM

## 2023-11-16 DIAGNOSIS — I27.29 PULMONARY HYPERTENSION DUE TO DIASTOLIC SYSTEMIC VENTRICULAR DYSFUNCTION: ICD-10-CM

## 2023-11-16 DIAGNOSIS — I51.9 PULMONARY HYPERTENSION DUE TO DIASTOLIC SYSTEMIC VENTRICULAR DYSFUNCTION: ICD-10-CM

## 2023-11-16 DIAGNOSIS — N18.31 STAGE 3A CHRONIC KIDNEY DISEASE: ICD-10-CM

## 2023-11-16 DIAGNOSIS — I89.0 LYMPHEDEMA: Primary | ICD-10-CM

## 2023-11-16 DIAGNOSIS — M41.9 SCOLIOSIS OF LUMBAR SPINE, UNSPECIFIED SCOLIOSIS TYPE: Chronic | ICD-10-CM

## 2023-11-16 DIAGNOSIS — F11.90 CHRONIC, CONTINUOUS USE OF OPIOIDS: ICD-10-CM

## 2023-11-16 DIAGNOSIS — M51.36 DEGENERATIVE DISC DISEASE, LUMBAR: Chronic | ICD-10-CM

## 2023-11-16 DIAGNOSIS — G43.909 MIGRAINE WITHOUT STATUS MIGRAINOSUS, NOT INTRACTABLE, UNSPECIFIED MIGRAINE TYPE: ICD-10-CM

## 2023-11-16 DIAGNOSIS — I83.009 VENOUS ULCER: ICD-10-CM

## 2023-11-16 DIAGNOSIS — I50.20 SYSTOLIC CONGESTIVE HEART FAILURE, UNSPECIFIED HF CHRONICITY: ICD-10-CM

## 2023-11-16 DIAGNOSIS — R25.1 TREMOR: ICD-10-CM

## 2023-11-16 LAB
ALBUMIN SERPL BCP-MCNC: 3.2 G/DL (ref 3.5–5.2)
ALP SERPL-CCNC: 154 U/L (ref 55–135)
ALT SERPL W/O P-5'-P-CCNC: 7 U/L (ref 10–44)
ANION GAP SERPL CALC-SCNC: 8 MMOL/L (ref 8–16)
AST SERPL-CCNC: 14 U/L (ref 10–40)
BASOPHILS # BLD AUTO: 0.01 K/UL (ref 0–0.2)
BASOPHILS NFR BLD: 0.1 % (ref 0–1.9)
BILIRUB SERPL-MCNC: 0.4 MG/DL (ref 0.1–1)
BNP SERPL-MCNC: 31 PG/ML (ref 0–99)
BUN SERPL-MCNC: 8 MG/DL (ref 8–23)
CALCIUM SERPL-MCNC: 9.2 MG/DL (ref 8.7–10.5)
CHLORIDE SERPL-SCNC: 98 MMOL/L (ref 95–110)
CO2 SERPL-SCNC: 33 MMOL/L (ref 23–29)
CREAT SERPL-MCNC: 1.1 MG/DL (ref 0.5–1.4)
DIFFERENTIAL METHOD: ABNORMAL
EOSINOPHIL # BLD AUTO: 0.3 K/UL (ref 0–0.5)
EOSINOPHIL NFR BLD: 3.6 % (ref 0–8)
ERYTHROCYTE [DISTWIDTH] IN BLOOD BY AUTOMATED COUNT: 14.6 % (ref 11.5–14.5)
EST. GFR  (NO RACE VARIABLE): 55.1 ML/MIN/1.73 M^2
GLUCOSE SERPL-MCNC: 83 MG/DL (ref 70–110)
HCT VFR BLD AUTO: 36.4 % (ref 37–48.5)
HGB BLD-MCNC: 11 G/DL (ref 12–16)
IMM GRANULOCYTES # BLD AUTO: 0.03 K/UL (ref 0–0.04)
IMM GRANULOCYTES NFR BLD AUTO: 0.3 % (ref 0–0.5)
LYMPHOCYTES # BLD AUTO: 0.7 K/UL (ref 1–4.8)
LYMPHOCYTES NFR BLD: 8 % (ref 18–48)
MCH RBC QN AUTO: 27 PG (ref 27–31)
MCHC RBC AUTO-ENTMCNC: 30.2 G/DL (ref 32–36)
MCV RBC AUTO: 89 FL (ref 82–98)
MONOCYTES # BLD AUTO: 0.6 K/UL (ref 0.3–1)
MONOCYTES NFR BLD: 6.7 % (ref 4–15)
NEUTROPHILS # BLD AUTO: 7.6 K/UL (ref 1.8–7.7)
NEUTROPHILS NFR BLD: 81.3 % (ref 38–73)
NRBC BLD-RTO: 0 /100 WBC
PLATELET # BLD AUTO: 289 K/UL (ref 150–450)
PMV BLD AUTO: 9.5 FL (ref 9.2–12.9)
POTASSIUM SERPL-SCNC: 3.7 MMOL/L (ref 3.5–5.1)
PROT SERPL-MCNC: 7.2 G/DL (ref 6–8.4)
RBC # BLD AUTO: 4.08 M/UL (ref 4–5.4)
SODIUM SERPL-SCNC: 139 MMOL/L (ref 136–145)
WBC # BLD AUTO: 9.29 K/UL (ref 3.9–12.7)

## 2023-11-16 PROCEDURE — 1125F AMNT PAIN NOTED PAIN PRSNT: CPT | Mod: CPTII,S$GLB,, | Performed by: NURSE PRACTITIONER

## 2023-11-16 PROCEDURE — 1101F PR PT FALLS ASSESS DOC 0-1 FALLS W/OUT INJ PAST YR: ICD-10-PCS | Mod: CPTII,S$GLB,, | Performed by: NURSE PRACTITIONER

## 2023-11-16 PROCEDURE — 3074F PR MOST RECENT SYSTOLIC BLOOD PRESSURE < 130 MM HG: ICD-10-PCS | Mod: CPTII,S$GLB,, | Performed by: NURSE PRACTITIONER

## 2023-11-16 PROCEDURE — 99215 OFFICE O/P EST HI 40 MIN: CPT | Mod: S$GLB,,, | Performed by: NURSE PRACTITIONER

## 2023-11-16 PROCEDURE — 3288F FALL RISK ASSESSMENT DOCD: CPT | Mod: CPTII,S$GLB,, | Performed by: NURSE PRACTITIONER

## 2023-11-16 PROCEDURE — 3074F SYST BP LT 130 MM HG: CPT | Mod: CPTII,S$GLB,, | Performed by: NURSE PRACTITIONER

## 2023-11-16 PROCEDURE — 3044F HG A1C LEVEL LT 7.0%: CPT | Mod: CPTII,S$GLB,, | Performed by: NURSE PRACTITIONER

## 2023-11-16 PROCEDURE — 83880 ASSAY OF NATRIURETIC PEPTIDE: CPT | Performed by: NURSE PRACTITIONER

## 2023-11-16 PROCEDURE — 3078F DIAST BP <80 MM HG: CPT | Mod: CPTII,S$GLB,, | Performed by: NURSE PRACTITIONER

## 2023-11-16 PROCEDURE — 99999 PR PBB SHADOW E&M-EST. PATIENT-LVL V: CPT | Mod: PBBFAC,,, | Performed by: NURSE PRACTITIONER

## 2023-11-16 PROCEDURE — 3078F PR MOST RECENT DIASTOLIC BLOOD PRESSURE < 80 MM HG: ICD-10-PCS | Mod: CPTII,S$GLB,, | Performed by: NURSE PRACTITIONER

## 2023-11-16 PROCEDURE — 3008F BODY MASS INDEX DOCD: CPT | Mod: CPTII,S$GLB,, | Performed by: NURSE PRACTITIONER

## 2023-11-16 PROCEDURE — 3288F PR FALLS RISK ASSESSMENT DOCUMENTED: ICD-10-PCS | Mod: CPTII,S$GLB,, | Performed by: NURSE PRACTITIONER

## 2023-11-16 PROCEDURE — 1159F PR MEDICATION LIST DOCUMENTED IN MEDICAL RECORD: ICD-10-PCS | Mod: CPTII,S$GLB,, | Performed by: NURSE PRACTITIONER

## 2023-11-16 PROCEDURE — 85025 COMPLETE CBC W/AUTO DIFF WBC: CPT | Performed by: NURSE PRACTITIONER

## 2023-11-16 PROCEDURE — 99999 PR PBB SHADOW E&M-EST. PATIENT-LVL V: ICD-10-PCS | Mod: PBBFAC,,, | Performed by: NURSE PRACTITIONER

## 2023-11-16 PROCEDURE — 1101F PT FALLS ASSESS-DOCD LE1/YR: CPT | Mod: CPTII,S$GLB,, | Performed by: NURSE PRACTITIONER

## 2023-11-16 PROCEDURE — 99215 PR OFFICE/OUTPT VISIT, EST, LEVL V, 40-54 MIN: ICD-10-PCS | Mod: S$GLB,,, | Performed by: NURSE PRACTITIONER

## 2023-11-16 PROCEDURE — 36415 COLL VENOUS BLD VENIPUNCTURE: CPT | Performed by: NURSE PRACTITIONER

## 2023-11-16 PROCEDURE — 1159F MED LIST DOCD IN RCRD: CPT | Mod: CPTII,S$GLB,, | Performed by: NURSE PRACTITIONER

## 2023-11-16 PROCEDURE — 3044F PR MOST RECENT HEMOGLOBIN A1C LEVEL <7.0%: ICD-10-PCS | Mod: CPTII,S$GLB,, | Performed by: NURSE PRACTITIONER

## 2023-11-16 PROCEDURE — 80053 COMPREHEN METABOLIC PANEL: CPT | Performed by: NURSE PRACTITIONER

## 2023-11-16 PROCEDURE — 1125F PR PAIN SEVERITY QUANTIFIED, PAIN PRESENT: ICD-10-PCS | Mod: CPTII,S$GLB,, | Performed by: NURSE PRACTITIONER

## 2023-11-16 PROCEDURE — 3008F PR BODY MASS INDEX (BMI) DOCUMENTED: ICD-10-PCS | Mod: CPTII,S$GLB,, | Performed by: NURSE PRACTITIONER

## 2023-11-16 RX ORDER — AMITRIPTYLINE HYDROCHLORIDE 25 MG/1
25 TABLET, FILM COATED ORAL NIGHTLY
COMMUNITY
Start: 2023-11-06 | End: 2023-12-28

## 2023-11-16 NOTE — PROGRESS NOTES
INTERNAL MEDICINE URGENT CARE NOTE    CHIEF COMPLAINT     Chief Complaint   Patient presents with    Injury     L-pinky finger    Pain     Back/legs/feet    Lipedema       HPI     Tasha Hawley is a 68 y.o. female who presents for an urgent visit today.    Here with c/o lower ext swelling d/t lymphedema with blisters - uses home wrapping kits but stopped x 1 day and the swelling worsened. Home health seeing pt but not doing wrapping - was referred to lymphedema clinic but cannot do outpt and home health PT services.     Right pinky laceration with kitchen knife  2 days ago. C/o Throbbing pain.       Chronic pain - followed by Dr Hillman at [place]   Has dilaudid pump and norco and tramadol           Past Medical History:  Past Medical History:   Diagnosis Date    Anxiety     Arthritis     Bilateral lower extremity edema     severe chronic    Carotid artery occlusion     Cataract     CHF (congestive heart failure)     Coronary artery disease     subtotalled LAD with collateral    Depression     Fever blister     Hard of hearing     Hypokalemia 01/09/2023    Hyponatremia 02/04/2022    Hypothyroid     Iron deficiency anemia     Lumbar radiculopathy     with chronic pain    Ocular migraine     Other emphysema 05/22/2023    Renal disorder     Sleep apnea     cpap       Home Medications:  Prior to Admission medications    Medication Sig Start Date End Date Taking? Authorizing Provider   amoxicillin-clavulanate 875-125mg (AUGMENTIN) 875-125 mg per tablet Take 1 tablet by mouth 2 (two) times daily. 10/1/23   Tushar Muniz MD   aspirin (ECOTRIN) 81 MG EC tablet Take 1 tablet (81 mg total) by mouth once daily. 5/22/23 5/21/24  Mesfin Hodges II, MD   atorvastatin (LIPITOR) 80 MG tablet Take 1 tablet (80 mg total) by mouth once daily. 6/8/23   Mesfin Hodges II, MD   butalbital-acetaminophen-caffeine -40 mg (FIORICET, ESGIC) -40 mg per tablet Take 1 tablet by mouth  daily as needed. 6/15/23   Provider, Historical   ciprofloxacin HCl (CIPRO) 500 MG tablet Take 1 tablet (500 mg total) by mouth 2 (two) times daily. for 3 days 11/14/23 11/17/23  Violeta Osorio MD   clindamycin (CLEOCIN) 300 MG capsule Take 1 capsule (300 mg total) by mouth every 8 (eight) hours. 9/14/23   Jon Soto MD   cyanocobalamin, vitamin B-12, 5,000 mcg Subl Place 1 tablet under the tongue once daily.    Provider, Historical   darifenacin (ENABLEX) 7.5 MG Tb24 Take 7.5 mg by mouth. 7/20/23   Provider, Historical   diclofenac sodium (VOLTAREN ARTHRITIS PAIN) 1 % Gel Apply 4 g topically 4 (four) times daily. 9/19/23   Kaitlynn Hoyt MD   docusate sodium (COLACE) 100 MG capsule Take 200 mg by mouth every evening.    Provider, Historical   ferrous gluconate (FERGON) 324 MG tablet Take 1 tablet (324 mg total) by mouth daily with breakfast. 5/28/23 11/27/23  Prince Vance MD   fludrocortisone (FLORINEF) 0.1 mg Tab Take a half-tablet (50 mcg) by mouth once daily 6/12/23   Efe Hickey MD   FLUoxetine 20 MG capsule Take 3 capsules (60 mg total) by mouth once daily. 10/27/23   Mesfin Hodges II, MD   fluticasone propionate (FLONASE) 50 mcg/actuation nasal spray 2 sprays (100 mcg total) by Each Nostril route once daily. 4/3/23 4/2/24  Nic Mistry MD   furosemide (LASIX) 40 MG tablet Take 2 tablets (80 mg total) by mouth 2 (two) times a day. 8/11/23   Nick Lozano MD   HYDROcodone-acetaminophen (NORCO)  mg per tablet Take 1 tablet by mouth every 6 (six) hours as needed. 9/28/23   Nic Carranza DPM   hydrocortisone (CORTEF) 10 MG Tab Take 1 tablet (10 mg total) by mouth every evening. 6/12/23   Efe Hickey MD   hydrOXYzine (ATARAX) 50 MG tablet Take 1 tablet (50 mg total) by mouth every 4 (four) hours as needed for Anxiety. 5/5/23   Bharti Sullivan MD   intrathecal pain pump compound Hydromorphone (7.5 mg/mL) infusion at 8.59 mg/day (0.3578 mg/hr) out of a total  reservoir volume of 40 mL  Pump filled monthly    Nigel Hillman MD   ketorolac (TORADOL) 10 mg tablet Take 10 mg by mouth daily as needed.    Provider, Historical   levothyroxine (SYNTHROID) 150 MCG tablet Take 1 tablet (150 mcg total) by mouth before breakfast. 9/22/23 9/21/24  Mesfin Hodges II, MD   LIDOcaine (LIDODERM) 5 % Place 1 patch onto the skin once daily. Remove & Discard patch within 12 hours or as directed by MD 1/19/23   Nic Mistry MD   liothyronine (CYTOMEL) 5 MCG Tab Take 1 tablet (5 mcg total) by mouth once daily. 9/15/23 9/14/24  Efe Hickey MD   nitroGLYCERIN (NITROSTAT) 0.4 MG SL tablet Place 0.4 mg under the tongue every 5 (five) minutes as needed for Chest pain.    Provider, Historical   nystatin (MYCOSTATIN) powder Apply topically 4 (four) times daily. 8/2/23   Apollo Moore MD   ondansetron (ZOFRAN-ODT) 4 MG TbDL Take 4 mg by mouth every 4 to 6 hours as needed.    Provider, Historical   pantoprazole (PROTONIX) 40 MG tablet Take 1 tablet (40 mg total) by mouth once daily. 5/30/23   Bharti Sullivan MD   potassium chloride (MICRO-K) 10 MEQ CpSR Take 2 capsules (20 mEq total) by mouth once daily. 6/8/23   Imani Juarez MD   promethazine (PHENERGAN) 25 MG tablet Take 25 mg by mouth every 6 (six) hours as needed for Nausea.    Provider, Historical   QUEtiapine (SEROQUEL) 100 MG Tab TAKE 2 TABLETS (200 MG) BY MOUTH NIGHTLY 10/27/23   Mesfin Hodges II, MD   senna (SENNA LAX) 8.6 mg tablet Take 2 tablets by mouth 2 (two) times daily. 1/31/20   Nic Peres MD   tiotropium bromide (SPIRIVA RESPIMAT) 2.5 mcg/actuation inhaler Inhale 2 puffs into the lungs once daily. Controller 6/8/23 6/7/24  Imani Juarez MD   traMADoL (ULTRAM) 50 mg tablet Take 1 tablet (50 mg total) by mouth every 4 (four) hours as needed for Pain. 10/2/23   Nic Carranza, CHAZ   traZODone (DESYREL) 300 MG tablet Take 1 tablet (300 mg total) by mouth every evening. 10/27/23    "Mesfin Hodges II, MD       Review of Systems:  Review of Systems   Constitutional:  Positive for unexpected weight change. Negative for chills and fever.   Respiratory:  Positive for shortness of breath. Negative for cough and wheezing.    Cardiovascular:  Positive for leg swelling.   Genitourinary:  Positive for difficulty urinating.        Has superpubic catheter in place        Health Maintainence:   Immunizations:  Health Maintenance         Date Due Completion Date    DEXA Scan Never done ---    RSV Vaccine (Age 60+) (1 - 1-dose 60+ series) Never done ---    Colorectal Cancer Screening 06/02/2021 6/2/2020    COVID-19 Vaccine (5 - 2023-24 season) 09/01/2023 1/6/2023    Lipid Panel 05/22/2024 5/22/2023    Hemoglobin A1c (Diabetic Prevention Screening) 07/09/2026 7/9/2023    TETANUS VACCINE 03/02/2027 3/2/2017             PHYSICAL EXAM     /60 (BP Location: Left arm, Patient Position: Sitting, BP Method: Large (Manual))   Pulse 74   Ht 5' 4" (1.626 m)   Wt 110.3 kg (243 lb 2.7 oz)   LMP  (LMP Unknown)   SpO2 (!) 92%   BMI 41.74 kg/m²     Physical Exam  Constitutional:       General: She is not in acute distress.     Appearance: She is normal weight. She is not toxic-appearing.   Musculoskeletal:      Right hand: Tenderness present. Normal sensation. Normal capillary refill.        Hands:         Legs:       Comments: Lower extremity lymphedema with ulcers     Laceration - edges not approximated - no erythema or induration    Neurological:      Mental Status: She is lethargic.         LABS     Lab Results   Component Value Date    HGBA1C 5.2 07/09/2023     CMP  Sodium   Date Value Ref Range Status   11/02/2023 140 136 - 145 mmol/L Final     Potassium   Date Value Ref Range Status   11/02/2023 3.4 (L) 3.5 - 5.1 mmol/L Final     Chloride   Date Value Ref Range Status   11/02/2023 98 95 - 110 mmol/L Final     CO2   Date Value Ref Range Status   11/02/2023 28 23 - 29 mmol/L Final     Glucose   Date " Value Ref Range Status   11/02/2023 100 70 - 110 mg/dL Final     BUN   Date Value Ref Range Status   11/02/2023 7 (L) 8 - 23 mg/dL Final     Creatinine   Date Value Ref Range Status   11/02/2023 1.0 0.5 - 1.4 mg/dL Final     Calcium   Date Value Ref Range Status   11/02/2023 9.6 8.7 - 10.5 mg/dL Final     Total Protein   Date Value Ref Range Status   11/02/2023 8.4 6.0 - 8.4 g/dL Final     Albumin   Date Value Ref Range Status   11/02/2023 3.7 3.5 - 5.2 g/dL Final     Total Bilirubin   Date Value Ref Range Status   11/02/2023 0.4 0.1 - 1.0 mg/dL Final     Comment:     For infants and newborns, interpretation of results should be based  on gestational age, weight and in agreement with clinical  observations.    Premature Infant recommended reference ranges:  Up to 24 hours.............<8.0 mg/dL  Up to 48 hours............<12.0 mg/dL  3-5 days..................<15.0 mg/dL  6-29 days.................<15.0 mg/dL       Alkaline Phosphatase   Date Value Ref Range Status   11/02/2023 172 (H) 55 - 135 U/L Final     AST   Date Value Ref Range Status   11/02/2023 14 10 - 40 U/L Final     ALT   Date Value Ref Range Status   11/02/2023 11 10 - 44 U/L Final     Anion Gap   Date Value Ref Range Status   11/02/2023 14 8 - 16 mmol/L Final     eGFR if    Date Value Ref Range Status   07/31/2022 >60 >60 mL/min/1.73 m^2 Final     eGFR if non    Date Value Ref Range Status   07/31/2022 >60 >60 mL/min/1.73 m^2 Final     Comment:     Calculation used to obtain the estimated glomerular filtration  rate (eGFR) is the CKD-EPI equation.        Lab Results   Component Value Date    WBC 5.53 11/02/2023    HGB 12.4 11/02/2023    HCT 40.0 11/02/2023    MCV 87 11/02/2023     11/02/2023     Lab Results   Component Value Date    CHOL 122 05/22/2023    CHOL 141 04/05/2022    CHOL 180 01/13/2021     Lab Results   Component Value Date    HDL 47 05/22/2023    HDL 52 04/05/2022    HDL 65 01/13/2021     Lab Results    Component Value Date    LDLCALC 60.4 (L) 05/22/2023    LDLCALC 75.8 04/05/2022    LDLCALC 99.6 01/13/2021     Lab Results   Component Value Date    TRIG 73 05/22/2023    TRIG 66 04/05/2022    TRIG 77 01/13/2021     Lab Results   Component Value Date    CHOLHDL 38.5 05/22/2023    CHOLHDL 36.9 04/05/2022    CHOLHDL 36.1 01/13/2021     Lab Results   Component Value Date    TSH 30.400 (H) 10/23/2023    Q1HOWXX 5.2 05/17/2020       ASSESSMENT/PLAN     Tasha Hawley is a 68 y.o. female     Lymphedema- weeping with ulcers. Poorly controlled with outpatient wrapping kits performed by family. Family willing to transport patient to outpt wound clinic and lymphedema clinic. Will place HH on hold until lymphedema is better controlled. Shai  notified and outpt appointments scheduled. Will send for labs today    -     Ambulatory referral/consult to Physical/Occupational Therapy; Future; Expected date: 11/23/2023  -     Ambulatory referral/consult to Wound Clinic; Future; Expected date: 11/23/2023  -     CBC Auto Differential; Future; Expected date: 11/16/2023  -     Comprehensive Metabolic Panel; Future; Expected date: 11/16/2023  -     B-TYPE NATRIURETIC PEPTIDE; Future; Expected date: 11/16/2023    Lymphedema of both lower extremities weeping with ulcers. Poorly controlled with outpatient wrapping kits performed by family. Family willing to transport patient to outpt wound clinic and lymphedema clinic. Will place HH on hold until lymphedema is better controlled. Shai  notified and outpt appointments scheduled. Will send for labs today   -     Ambulatory referral/consult to Physical/Occupational Therapy; Future; Expected date: 11/23/2023  -     Ambulatory referral/consult to Wound Clinic; Future; Expected date: 11/23/2023    Venous ulcer weeping with ulcers. Poorly controlled with outpatient wrapping kits performed by family. Family willing to transport patient to outpt wound clinic and lymphedema clinic. Will place  HH on hold until lymphedema is better controlled. [facility] HH notified and outpt appointments scheduled. Will send for labs today   -     Ambulatory referral/consult to Physical/Occupational Therapy; Future; Expected date: 11/23/2023  -     Ambulatory referral/consult to Wound Clinic; Future; Expected date: 11/23/2023    Chronic systolic heart failure- labs today. Will cont lasix and monitor. Daily weights. Low na diet.   -     CBC Auto Differential; Future; Expected date: 11/16/2023  -     Comprehensive Metabolic Panel; Future; Expected date: 11/16/2023  -     B-TYPE NATRIURETIC PEPTIDE; Future; Expected date: 11/16/2023    Chronic diastolic heart failure- labs today. Will cont lasix and monitor. Daily weights. Low na diet.   -     CBC Auto Differential; Future; Expected date: 11/16/2023  -     Comprehensive Metabolic Panel; Future; Expected date: 11/16/2023  -     B-TYPE NATRIURETIC PEPTIDE; Future; Expected date: 11/16/2023    Systolic congestive heart failure, unspecified HF chronicity- labs today. Will cont lasix and monitor. Daily weights. Low na diet.     Stage 3a chronic kidney disease- labs today. Will cont lasix and monitor. Daily weights. Low na diet.     S/P insertion of intrathecal pump/Chronic pain syndrome- advised I will not refill opioids, will need to f/u with pain mgmt    Laceration hand- bandage removed and cleaned. Applied bacitracin and covered with dressing. Will change daily and apply triple antibiotic ointment       Osteoarthritis of spine with radiculopathy, lumbar region    Paresthesia of both lower extremities    Narcotic dependency, continuous    Chronic, continuous use of opioids    Pulmonary hypertension due to diastolic systemic ventricular dysfunction           Follow up with PCP as directed     Patient education provided from [facility]. Patient was counseled on when and how to seek emergent care.       Cat PALENCIA, APRN, FNP-c   Department of Internal Medicine - [facility]  Osmar Zayas  2:34 PM

## 2023-11-16 NOTE — TELEPHONE ENCOUNTER
----- Message from Prema Payton sent at 11/16/2023  3:17 PM CST -----  Regarding: Wound care appt  Contact: [phone]  Brianna from [facility] internal; medicine is calling to schedule appointment due to wound care asap.Please contact patient to further discuss.

## 2023-11-16 NOTE — TELEPHONE ENCOUNTER
Advised call center thru Teams that patient has a current appointment scheduled for tomorrow with Dr. Gonzalez at [facility] for 9am and she keep this appointment as my provider is not in clinic on Friday.

## 2023-11-17 ENCOUNTER — TELEPHONE (OUTPATIENT)
Dept: INTERNAL MEDICINE | Facility: CLINIC | Age: 68
End: 2023-11-17
Payer: MEDICARE

## 2023-11-17 ENCOUNTER — TELEPHONE (OUTPATIENT)
Dept: INTERNAL MEDICINE | Facility: CLINIC | Age: 68
End: 2023-11-17

## 2023-11-17 ENCOUNTER — HOSPITAL ENCOUNTER (OUTPATIENT)
Dept: WOUND CARE | Facility: HOSPITAL | Age: 68
Discharge: HOME | End: 2023-11-17
Attending: PREVENTIVE MEDICINE
Payer: MEDICARE

## 2023-11-17 DIAGNOSIS — I89.0 LYMPHEDEMA OF BOTH LOWER EXTREMITIES: ICD-10-CM

## 2023-11-17 DIAGNOSIS — L97.909 VENOUS ULCER: ICD-10-CM

## 2023-11-17 DIAGNOSIS — I89.0 LYMPHEDEMA: ICD-10-CM

## 2023-11-17 DIAGNOSIS — I83.009 VENOUS ULCER: ICD-10-CM

## 2023-11-17 PROCEDURE — 99205 OFFICE O/P NEW HI 60 MIN: CPT | Mod: 25

## 2023-11-17 PROCEDURE — 29581 APPL MULTLAYER CMPRN SYS LEG: CPT | Mod: 50

## 2023-11-17 NOTE — TELEPHONE ENCOUNTER
----- Message from Karla Barboza sent at 11/17/2023  2:11 PM CST -----  Contact: [phone]  Patient is returning a phone call.  Who left a message for the patient: SHONDA Pierce  Does patient know what this is regarding:  no  Would you like a call back, or a response through your [facility] portal?:  phone  Comments:

## 2023-11-17 NOTE — PROGRESS NOTES
W[facility] Care [facility]    Subjective:       Patient ID: Tasha Hawley is a 68 y.o. female.    Chief Complaint: Non-healing Wound Follow Up    Patient seen for BLE and right 5th finger wound.  Blisters at BLE noted.  Educated on need for stronger compression.  Tolerated compression well.  New orders routed to home health.    Review of Systems   All other systems reviewed and are negative.        Objective:         Physical Exam       Altered Skin Integrity 09/06/23 1000 Left lower;posterior Leg Other (comment) (Active)   09/06/23 1000   Altered Skin Integrity Present on Admission - Did Patient arrive to the hospital with altered skin?: yes   Side: Left   Orientation: lower;posterior   Location: Leg   Wound Number:    Is this injury device related?: No   Primary Wound Type: Other   Description of Altered Skin Integrity:    Ankle-Brachial Index:    Pulses:    Removal Indication and Assessment:    Wound Outcome:    (Retired) Wound Length (cm):    (Retired) Wound Width (cm):    (Retired) Depth (cm):    Wound Description (Comments):    Removal Indications:    Wound Image   11/17/23 1311   Drainage Amount Large 11/17/23 1311   Drainage Characteristics/Odor Serous 11/17/23 1311   Appearance Pink 11/17/23 1311   Tissue loss description Partial thickness 11/17/23 1311   Red (%), Wound Tissue Color 100 % 11/17/23 1311   Periwound Area Edematous 11/17/23 1311   Wound Length (cm) 10 cm 11/17/23 1311   Wound Width (cm) 10 cm 11/17/23 1311   Wound Depth (cm) 0.1 cm 11/17/23 1311   Wound Volume (cm^3) 10 cm^3 11/17/23 1311   Wound Surface Area (cm^2) 100 cm^2 11/17/23 1311   Care Cleansed with:;Soap and water 11/17/23 1311   Dressing Calcium alginate 11/17/23 1311   Compression Four layer compression 11/17/23 1311   Dressing Change Due 11/24/23 11/17/23 1311            Altered Skin Integrity 09/06/23 1000 Right lower Leg Other (comment) (Active)   09/06/23 1000   Altered Skin Integrity  Present on Admission - Did Patient arrive to the hospital with altered skin?: yes   Side: Right   Orientation: lower   Location: Leg   Wound Number:    Is this injury device related?: No   Primary Wound Type: Other   Description of Altered Skin Integrity:    Ankle-Brachial Index:    Pulses:    Removal Indication and Assessment:    Wound Outcome:    (Retired) Wound Length (cm):    (Retired) Wound Width (cm):    (Retired) Depth (cm):    Wound Description (Comments):    Removal Indications:    Wound Image     11/17/23 1311   Description of Altered Skin Integrity Partial thickness tissue loss. Shallow open ulcer with a red or pink wound bed, without slough. Intact or Open/Ruptured Serum-filled blister. 11/17/23 1311   Drainage Amount None 11/17/23 1311   Appearance Pink 11/17/23 1311   Tissue loss description Partial thickness 11/17/23 1311   Red (%), Wound Tissue Color 100 % 11/17/23 1311   Periwound Area Edematous 11/17/23 1311   Wound Length (cm) 5 cm 11/17/23 1311   Wound Width (cm) 5 cm 11/17/23 1311   Wound Depth (cm) 0.1 cm 11/17/23 1311   Wound Volume (cm^3) 2.5 cm^3 11/17/23 1311   Wound Surface Area (cm^2) 25 cm^2 11/17/23 1311   Care Cleansed with:;Soap and water 11/17/23 1311   Dressing Calcium alginate 11/17/23 1311   Compression Four layer compression 11/17/23 1311   Dressing Change Due 11/24/23 11/17/23 1311            Altered Skin Integrity 11/17/23 1316 Right distal Finger, fifth (Active)   11/17/23 1316   Altered Skin Integrity Present on Admission - Did Patient arrive to the hospital with altered skin?: yes   Side: Right   Orientation: distal   Location: Finger, fifth   Wound Number:    Is this injury device related?:    Primary Wound Type:    Description of Altered Skin Integrity:    Ankle-Brachial Index:    Pulses:    Removal Indication and Assessment:    Wound Outcome:    (Retired) Wound Length (cm):    (Retired) Wound Width (cm):    (Retired) Depth (cm):    Wound Description (Comments):    Removal  Indications:    Wound Image   11/17/23 1311   Description of Altered Skin Integrity Full thickness tissue loss. Subcutaneous fat may be visible but bone, tendon or muscle are not exposed 11/17/23 1311   Dressing Appearance Moist drainage 11/17/23 1311   Drainage Amount Scant 11/17/23 1311   Drainage Characteristics/Odor Serosanguineous 11/17/23 1311   Appearance Red 11/17/23 1311   Tissue loss description Full thickness 11/17/23 1311   Red (%), Wound Tissue Color 100 % 11/17/23 1311   Periwound Area Intact;Macerated 11/17/23 1311   Wound Edges Defined;Open 11/17/23 1311   Wound Length (cm) 0.7 cm 11/17/23 1311   Wound Width (cm) 0.1 cm 11/17/23 1311   Wound Depth (cm) 0.2 cm 11/17/23 1311   Wound Volume (cm^3) 0.014 cm^3 11/17/23 1311   Wound Surface Area (cm^2) 0.07 cm^2 11/17/23 1311   Care Cleansed with:;Sterile normal saline 11/17/23 1311   Dressing Applied;Calcium alginate;Foam 11/17/23 1311   Dressing Change Due 11/24/23 11/17/23 1311         Assessment/Plan:         ICD-10-CM ICD-9-CM   1. Lymphedema of both lower extremities  I89.0 457.1   2. Lymphedema  I89.0 457.1   3. Venous ulcer  I83.009 707.9    L97.909        Needs to discuss increase diuretics with PCP    Tissue pathology and/or culture taken:  [] Yes [x] No   Sharp debridement performed:   [] Yes [x] No   Labs ordered this visit:   [] Yes [x] No   Imaging ordered this visit:   [] Yes [x] No           Orders Placed This Encounter   Procedures    Ambulatory referral/consult to Wound Clinic     Standing Status:   Standing     Number of Occurrences:   1     Referral Priority:   Routine     Referral Type:   Consultation     Referral Reason:   Specialty Services Required     Requested Specialty:   Wound Care     Number of Visits Requested:   1    Change dressing     BLE  Cleanse wound with: water and soap  Lidocaine:prn  Silver nitrate:prn  Periwound care:prn  Primary dressing:Aquacel AG  Edema control: Profore 4 layers to BLE from toes to  knee+flexinet  Frequency: 2 x week      Right 5th finger  Cleanse wound with:NS  Primary dressing:Aquacel Ag+foam drsSadiq  Frequency 2 x week+prn    Follow-up: 1 week Dr Wylie  [facility]: Please admit patient for skilled nursing wound care .    Change dressing     BLE  Cleanse wound with: water and soap  Lidocaine:prn  Silver nitrate:prn  Periwound care:prn  Primary dressing:Aquacel AG  Edema control: Profore 4 layers to BLE from toes to knee+flexinet  Frequency: 2 x week      Right 5th finger  Cleanse wound with:NS  Primary dressing:Aquacel Ag+foam drsSadiq  Frequency 2 x week+prn    Follow-up: 1 week Dr Wylie  [facility], [facility]/ [facility]: Please provide skilled nursing care with orders above.  See patient 2 x week on non-clinic weeks and 1 x week on clinic weeks.  May d/c PT.        Follow up in about 1 week (around 11/24/2023) for Dr Wylie.            This includes face to face time and non-face to face time preparing to see the patient (eg, review of tests), obtaining and/or reviewing separately obtained history, documenting clinical information in the electronic or other health record, independently interpreting results and communicating results to the patient/family/caregiver, or care coordinator.

## 2023-11-17 NOTE — TELEPHONE ENCOUNTER
Pt would like a script for pain sent to pharmacy. Pt states her legs are hurting more due to PT. Pt requested Vicodin. Please advise. Thanks.

## 2023-11-17 NOTE — TELEPHONE ENCOUNTER
----- Message from Brianna Garcia sent at 11/17/2023  4:17 PM CST -----  Contact: [phone]  [email or web address]     Patient is calling for Medical Advice regarding:speak to      How long has patient had these symptoms:    Pharmacy name and phone#:    Would like response via [employer]:      Comments:  Pt states she was seen yesterday and she states the pain management said he can not do anything for her until Monday

## 2023-11-20 ENCOUNTER — TELEPHONE (OUTPATIENT)
Dept: INTERNAL MEDICINE | Facility: CLINIC | Age: 68
End: 2023-11-20
Payer: MEDICARE

## 2023-11-20 NOTE — TELEPHONE ENCOUNTER
Please call her back.  She doesn't need a different diuretic; she needs to go to the lymphedema clinic as the NP ordered last week.  Stronger diuretics will cause more harm than good and not solve the problem.    Thanks.  D

## 2023-11-20 NOTE — TELEPHONE ENCOUNTER
Pt stated that she would like a different rx for her fluid. She stated the current ones aren't working out for her, she still has fluid.    Josh TREVIZO

## 2023-11-20 NOTE — TELEPHONE ENCOUNTER
"Informed pt, pt was scheduled for the lymphedema clinic but it was cancelled due to her "receiving care else where" (home health).    Josh ALLAN.  "

## 2023-11-20 NOTE — TELEPHONE ENCOUNTER
----- Message from Saba Landrum sent at 11/20/2023  8:38 AM CST -----  Contact: [phone]@[email or web address]  Good morning patient would like a call back to discuss getting some pain med called in for her legs and getting a different med. Please give patient a call back [phone]

## 2023-11-21 ENCOUNTER — OUTPATIENT CASE MANAGEMENT (OUTPATIENT)
Dept: ADMINISTRATIVE | Facility: OTHER | Age: 68
End: 2023-11-21
Payer: MEDICARE

## 2023-11-21 NOTE — LETTER
Tasha Hawley  [address] [place] [address]      Dear Tasha Hawley,     I am your nurse with [facility]s [facility] Department. I have been unsuccessful at reaching you since we spoke on 10/23.  At your earliest convenience, I would like to discuss your healthcare progress.      Please contact me at [phone] from 8:00AM to 4:30 PM on Monday thru Friday.     As you know, [facility] is a program offered to you through [facility] where a nurse is available 24/7 to answer questions or provide medical advice, their number is [phone].    Thanks,    Kenya Box RN  Outpatient Care Management

## 2023-11-22 ENCOUNTER — TELEPHONE (OUTPATIENT)
Dept: INTERNAL MEDICINE | Facility: CLINIC | Age: 68
End: 2023-11-22
Payer: MEDICARE

## 2023-11-22 DIAGNOSIS — I89.0 LYMPHEDEMA OF BOTH LOWER EXTREMITIES: Primary | ICD-10-CM

## 2023-11-22 NOTE — TELEPHONE ENCOUNTER
----- Message from Mitzi Bryson sent at 11/22/2023  9:48 AM CST -----  Contact: [phone]  Medical Advice: Patient need orders for LYMPHEDEMA clinic, please call and advise.    Patient had several appointments scheduled however they have all been cancelled due to a conflict of interest with her having home health?

## 2023-11-22 NOTE — TELEPHONE ENCOUNTER
----- Message from Mitzi Bryson sent at 11/22/2023  9:48 AM CST -----  Contact: [phone]  Medical Advice: Patient need orders for LYMPHEDEMA clinic, please call and advise.    Patient had several appointments scheduled however they have all been cancelled due to a conflict of interest with her having home health?

## 2023-11-24 ENCOUNTER — HOSPITAL ENCOUNTER (OUTPATIENT)
Dept: WOUND CARE | Facility: HOSPITAL | Age: 68
Discharge: HOME | End: 2023-11-24
Attending: PREVENTIVE MEDICINE
Payer: MEDICARE

## 2023-11-24 DIAGNOSIS — I89.0 LYMPHEDEMA OF BOTH LOWER EXTREMITIES: ICD-10-CM

## 2023-11-24 DIAGNOSIS — I89.0 LYMPHEDEMA: Primary | ICD-10-CM

## 2023-11-24 PROCEDURE — 29581 APPL MULTLAYER CMPRN SYS LEG: CPT

## 2023-11-24 NOTE — PROGRESS NOTES
[facility]    Subjective:       Patient ID: Tasha Hawley is a 68 y.o. female.    Chief Complaint: Venous Ulcer    Wound care follow up for bilateral leg ulcers. Right 5th finger wound resolved. No open ulcerations noted to legs,no weeping, no blisters -  tolerating compression. Plan for patient to return to clinic as needed, home health will continue to follow until care established with lymphedema clinic.     Review of Systems   All other systems reviewed and are negative.        Objective:         Physical Exam       Altered Skin Integrity 09/06/23 1000 Left lower;posterior Leg Other (comment) (Active)   09/06/23 1000   Altered Skin Integrity Present on Admission - Did Patient arrive to the hospital with altered skin?: yes   Side: Left   Orientation: lower;posterior   Location: Leg   Wound Number:    Is this injury device related?: No   Primary Wound Type: Other   Description of Altered Skin Integrity:    Ankle-Brachial Index:    Pulses:    Removal Indication and Assessment:    Wound Outcome:    (Retired) Wound Length (cm):    (Retired) Wound Width (cm):    (Retired) Depth (cm):    Wound Description (Comments):    Removal Indications:    Wound Image   11/24/23 0918   Description of Altered Skin Integrity Partial thickness tissue loss. Shallow open ulcer with a red or pink wound bed, without slough. Intact or Open/Ruptured Serum-filled blister. 11/24/23 0918   Dressing Appearance Intact 11/24/23 0918   Drainage Amount None 11/24/23 0918   Appearance Epithelialization 11/24/23 0918   Periwound Area Edematous;Dry 11/24/23 0918   Wound Length (cm) 0 cm 11/24/23 0918   Wound Width (cm) 0 cm 11/24/23 0918   Wound Depth (cm) 0 cm 11/24/23 0918   Wound Volume (cm^3) 0 cm^3 11/24/23 0918   Wound Surface Area (cm^2) 0 cm^2 11/24/23 0918   Care Cleansed with:;Soap and water 11/24/23 0918   Periwound Care Moisturizer applied 11/24/23 0918   Compression Four layer  compression 11/24/23 0918   Dressing Change Due 11/28/23 11/24/23 0918            Altered Skin Integrity 09/06/23 1000 Right lower Leg Other (comment) (Active)   09/06/23 1000   Altered Skin Integrity Present on Admission - Did Patient arrive to the hospital with altered skin?: yes   Side: Right   Orientation: lower   Location: Leg   Wound Number:    Is this injury device related?: No   Primary Wound Type: Other   Description of Altered Skin Integrity:    Ankle-Brachial Index:    Pulses:    Removal Indication and Assessment:    Wound Outcome:    (Retired) Wound Length (cm):    (Retired) Wound Width (cm):    (Retired) Depth (cm):    Wound Description (Comments):    Removal Indications:    Wound Image   11/24/23 0918   Drainage Amount None 11/24/23 0918   Appearance Epithelialization 11/24/23 0918   Periwound Area Edematous;Dry 11/24/23 0918   Wound Edges Rolled/closed 11/24/23 0918   Wound Length (cm) 0 cm 11/24/23 0918   Wound Width (cm) 0 cm 11/24/23 0918   Wound Depth (cm) 0 cm 11/24/23 0918   Wound Volume (cm^3) 0 cm^3 11/24/23 0918   Wound Surface Area (cm^2) 0 cm^2 11/24/23 0918   Care Cleansed with:;Soap and water 11/24/23 0918   Compression Four layer compression 11/24/23 0918   Dressing Change Due 11/28/23 11/24/23 0918            Altered Skin Integrity 11/17/23 1316 Right distal Finger, fifth (Active)   11/17/23 1316   Altered Skin Integrity Present on Admission - Did Patient arrive to the hospital with altered skin?: yes   Side: Right   Orientation: distal   Location: Finger, fifth   Wound Number:    Is this injury device related?:    Primary Wound Type:    Description of Altered Skin Integrity:    Ankle-Brachial Index:    Pulses:    Removal Indication and Assessment:    Wound Outcome:    (Retired) Wound Length (cm):    (Retired) Wound Width (cm):    (Retired) Depth (cm):    Wound Description (Comments):    Removal Indications:    Wound Image   11/24/23 1046   Description of Altered Skin Integrity Full  thickness tissue loss. Subcutaneous fat may be visible but bone, tendon or muscle are not exposed 11/24/23 1046   Dressing Appearance Open to air 11/24/23 1046   Drainage Amount None 11/24/23 1046   Appearance Epithelialization 11/24/23 1046   Periwound Area Intact 11/24/23 1046   Wound Edges Rolled/closed 11/24/23 1046   Wound Length (cm) 0 cm 11/24/23 1046   Wound Width (cm) 0 cm 11/24/23 1046   Wound Depth (cm) 0 cm 11/24/23 1046   Wound Volume (cm^3) 0 cm^3 11/24/23 1046   Wound Surface Area (cm^2) 0 cm^2 11/24/23 1046         Assessment/Plan:         ICD-10-CM ICD-9-CM   1. Lymphedema  I89.0 457.1   2. Lymphedema of both lower extremities  I89.0 457.1           Tissue pathology and/or culture taken:  [] Yes [x] No   Sharp debridement performed:   [] Yes [x] No   Labs ordered this visit:   [] Yes [x] No   Imaging ordered this visit:   [] Yes [x] No           Orders Placed This Encounter   Procedures    Ambulatory referral/consult to Physical/Occupational Therapy     Standing Status:   Future     Standing Expiration Date:   12/24/2024     Referral Priority:   Routine     Referral Type:   Physical Medicine     Referral Reason:   Specialty Services Required     Number of Visits Requested:   1    Change dressing     BLE  Cleanse legs with: water and soap, wounds with saline  Lidocaine:prn  Silver nitrate:prn  Periwound care:lotion  Primary dressing:Aquacel AG to any open areas  Edema control: Profore 4 layers to BLE from toes to knee,flexinet  Frequency: 2 x week    Follow-up: prn  Home Health/ [facility]: Please provide skilled nursing care with orders above.  See patient 2 x week until care is established at lymphedema clinic.   Other: Refer to lymphedema clinic.        Follow up if symptoms worsen or fail to improve.            This includes face to face time and non-face to face time preparing to see the patient (eg, review of tests), obtaining and/or reviewing separately obtained history, documenting  clinical information in the electronic or other health record, independently interpreting results and communicating results to the patient/family/caregiver, or care coordinator.

## 2023-11-27 NOTE — TELEPHONE ENCOUNTER
I put in a new order.  But it looks like she has an appt on 12/11 for the lymphedema clinic and an active referral.    D

## 2023-12-05 NOTE — ASSESSMENT & PLAN NOTE
- unclear etiology; possibly 2/2 increased lethargy resulting in decreased PO intake   - dietary consult: Boost BID  - check tumor markers to r/o underlying ca: AFP negative,  negative   Pt seen by MD and talked with sons, pt to be transitioned to comfort cares.

## 2023-12-08 ENCOUNTER — OUTPATIENT CASE MANAGEMENT (OUTPATIENT)
Dept: ADMINISTRATIVE | Facility: OTHER | Age: 68
End: 2023-12-08
Payer: MEDICARE

## 2023-12-08 NOTE — PROGRESS NOTES
Outpatient Care Management  Plan of Care Follow Up Visit    Patient: Tasha Hawley  MRN: [id number]  Date of Service: 12/08/2023  Completed by: Michelle Box RN  Referral Date: 04/17/2023    Reason for Visit   Patient presents with    OPCM RN Follow Up Call       Brief Summary: Patient states she is 240 lbs on Monday 12/4.  She states that she has gained 60 lbs in the last 6 months and it is very bothersome to her.  She states she always has shortness of breath, especially on exertion.  She wears her Oxygen at 3L/NC at home at all times.  She states her legs are very swollen with blisters from the lymphedema with a sore on the back of the left calf.  Home health comes a couple of times a week to re-wrap her legs.  Patient states she thinks the legs look worse.  She has an appointment with the Lymphedema Clinic on 12/11/23 at 14:30 to start going there for treatments.  She states she is still following a low sodium diet and uses Mrs Dash, Garlic and Lemon Pepper to season her foods.  Patient didn't go to her NM full body scans in November that was ordered by Dr. Daniele Zarate of Rheumatology.    CM reiterated the S&S of CHF to patient including difficulty breathing or shortness of breath, especially with activity; persistent cough; swelling of feet, legs or abdomen; unexplained weight gain; fatigue; weakness; loss of appetite; dizziness or rapid heartbeat.  CM also reiterated to patient the 3 lb/5 lb rule.  CM reminded patient of her appointment on Monday, 12/11 for the Lymphedema Clinic.  ARTURO called [facility] to reschedule the NM scans.  [facility] unable to make the appointments, ARTURO has to speak directly to NM department.  ARTURO called NM department [phone] to schedule appointments, went to voicemail, CM left message for call back.    15:30  CM received phone call from Mitzi in Nuclear Medicine to schedule appointments.  NM Bone Scan scheduled for Friday, 12/15 at 11:00 AM for injection and  1:30 PM for scan.  NM Joint Scan scheduled for Wednesday, 12/20 at 11:00 AM for injection and scan.  CM called patient to inform her of NM scans.  Patient given times and told to report to imaging on the second floor of [facility].      Next steps:   Follow up if weighing herself daily and logging  Follow up on 3 lb/5 lb rule  Follow up if cooking with salt  Follow up on low sodium diet  Follow up if patient went to Lymphedema Clinic on 12/11  Follow up if NM scans rescheduled  Follow up on S&S of CHF  Follow up on S&S of VTE  Follow up on S&S of PE

## 2023-12-11 ENCOUNTER — CLINICAL SUPPORT (OUTPATIENT)
Dept: REHABILITATION | Facility: HOSPITAL | Age: 68
End: 2023-12-11
Attending: INTERNAL MEDICINE
Payer: MEDICARE

## 2023-12-11 DIAGNOSIS — M79.604 LEG PAIN, BILATERAL: ICD-10-CM

## 2023-12-11 DIAGNOSIS — I89.0 LYMPHEDEMA OF BOTH LOWER EXTREMITIES: ICD-10-CM

## 2023-12-11 DIAGNOSIS — I89.0 LYMPHEDEMA: ICD-10-CM

## 2023-12-11 DIAGNOSIS — R29.6 FREQUENT FALLS: Primary | ICD-10-CM

## 2023-12-11 DIAGNOSIS — M79.605 LEG PAIN, BILATERAL: ICD-10-CM

## 2023-12-11 PROBLEM — N39.0 UTI (URINARY TRACT INFECTION): Chronic | Status: RESOLVED | Noted: 2021-06-03 | Resolved: 2023-12-11

## 2023-12-11 PROBLEM — N39.0 RECURRENT UTI: Status: RESOLVED | Noted: 2018-06-08 | Resolved: 2023-12-11

## 2023-12-11 PROCEDURE — 97140 MANUAL THERAPY 1/> REGIONS: CPT

## 2023-12-11 PROCEDURE — 97535 SELF CARE MNGMENT TRAINING: CPT

## 2023-12-11 PROCEDURE — 97163 PT EVAL HIGH COMPLEX 45 MIN: CPT

## 2023-12-11 NOTE — PROGRESS NOTES
See evaluation in POC for goals and assessment     Eval Date: 12/18/2023    Jailene Zaman, PT, DPT, CLT

## 2023-12-18 ENCOUNTER — EXTERNAL HOME HEALTH (OUTPATIENT)
Dept: HOME HEALTH SERVICES | Facility: HOSPITAL | Age: 68
End: 2023-12-18
Payer: MEDICARE

## 2023-12-18 ENCOUNTER — TELEPHONE (OUTPATIENT)
Dept: REHABILITATION | Facility: HOSPITAL | Age: 68
End: 2023-12-18
Payer: MEDICARE

## 2023-12-18 ENCOUNTER — DOCUMENT SCAN (OUTPATIENT)
Dept: HOME HEALTH SERVICES | Facility: HOSPITAL | Age: 68
End: 2023-12-18
Payer: MEDICARE

## 2023-12-18 NOTE — TELEPHONE ENCOUNTER
12/12    Returned Pt's daughter's call. Discussed how Pt's MD would like to prioritize lymph PT to try to reduce pt's recurrent wounds. Listed needs from patient to come to clinic on time and if that would be feisible. Pt's daughter in agreement to try course of therapy but if not feisible, will return to .     Jailene Zaman, PT, DPT, CLT

## 2023-12-18 NOTE — TELEPHONE ENCOUNTER
I sent phenergan to her pharmacy.  Please call and inform her.   Paul Hatfield is a 78 year old male presenting for   Chief Complaint   Patient presents with    Office Visit    Follow-up     Denies Latex allergy or sensitivity.    Medication verified, no changes  Refills needed today: No    Health Maintenance Due   Topic Date Due    Shingles Vaccine (1 of 2) Never done    COVID-19 Vaccine (5 - 2023-24 season) 09/01/2023       Patient is due for topics as listed above but is not proceeding with Immunization(s) COVID-19 and Shingles at this time. Education provided for Immunization(s) COVID-19 and Shingles.          Last lab results:   Hemoglobin A1C (%)   Date Value   08/04/2021 5.6     Cholesterol (mg/dL)   Date Value   06/23/2023 124     HDL (mg/dL)   Date Value   06/23/2023 32 (L)     Triglycerides (mg/dL)   Date Value   06/23/2023 153 (H)     LDL (mg/dL)   Date Value   06/23/2023 61     No results found for: \"URMIC\", \"UCR\", \"MALBCR\"  No results found for: \"IFOB\"                 Depression Screening:  Review Flowsheet  More data exists         12/18/2023   PHQ 2/9 Score   Adult PHQ 2 Score 1   Adult PHQ 2 Interpretation No further screening needed   Little interest or pleasure in activity? Not at all   Feeling down, depressed or hopeless? Several days        Advance Directives:  on file

## 2023-12-18 NOTE — TELEPHONE ENCOUNTER
12/13/2023:     Pt's  called  to cancel pt's visit saying she was sick. PT called pt and Ms. Aguila reports her B feet are hurting her significantly and she may be going to urgent care to get them looked at. Pt reports bandages did not cause any more pain than usual pain in legs and feet, pt encouraged to don edemawear with gauze provided to cover wounds after removing bandages from LLE.     Jailene Zaman, PT, DPT, CLT

## 2023-12-18 NOTE — TELEPHONE ENCOUNTER
12/18/2023  1:00pm     Pt called about no show 12:30 appt. Pt reports she was not aware of visit. Pt offered later visit today but pt declined, saying her legs were swollen and hurting. Pt educated that therapy will address these concerns but she has to attend. Confirmed pt's appt for 12/20    Jailene Zaman, PT, DPT, CLT

## 2023-12-18 NOTE — PATIENT INSTRUCTIONS
LYMPHEDEMA    What is Lymphedema  Swelling in an arm, leg, the neck, the face, genitals and/or abdomen caused by a blockage in the lymphatic system. This type of swelling can decrease the amount of oxygen that reaches tissues and can also promote infection by serving as a medium in which bacteria may grow. An example of an infection seen commonly in those with Lymphedema is Cellulitis.  It is important to know that lymphedema is progressive and can lead to long-term, chronic conditions. Therefore, early and careful management is needed to reduce symptoms.    The Lymphatic System  A network of tissues and organs that help rid the body of toxins, waste and other unwanted materials. The lymphatic system returns fluid and protein to the circulatory system from the spaces in the body's tissues. Tiny lymph vessels of the lymphatic system  materials and waste products, along with foreign materials (bacteria) and transport them to larger lymph vessels. This fluid inside the vessels is called lymph. Surrounding muscles help move the lymph along to the larger vessels until it is returned to the circulatory system. Lymph nodes help filter the lymph fluid and break down foreign substances and help fight infection.                                                          What Causes Lymphedema?  Some people are born with an underdeveloped lymph system. Swelling may present at birth or develop during adolescence or adulthood. This is known as primary lymphedema. Secondary lymphedema may occur after lymph tissue and lymph nodes are injured or removed. This swelling may begin immediately or may not occur for several months or years. A common cause of secondary lymphedema is radiation treatment. Other causes of secondary lymphedema are surgery (removal of lymph nodes), cancer, infection and trauma. While there is presently no cure for lymphedema, it can be managed with diligent care. The earlier the stage, the easier the  management of the affected area.    Stages of Lymphedema:  Stage 0 (pre-stage): A subclinical state where swelling is not obvious. You may have complaints such as heaviness in the limb or mild aching or tightness  Stage 1: Skin of the limb is typically soft with pitting edema. Elevation of the limb may improve the swelling  Stage 2: Skin of the limb is more fibrotic. The skin may no longer pit when pressed. Limb swelling does not improve with elevation.   Stage 3: Severe stage also known as elephantiasis. Skin is fibrotic with deep skin creases which are prone to infection. Trophic skin changes, such as papillomatosis and hyperkeratosis also occur in this stage.                                                                             Precautions:  It is important to prevent skin irritation, cuts, scrapes and needle sticks in the skin to decrease risk of infection  Wear gloves for gardening and housework  Use an electric razor rather than a blade razor  Have injections and blood draws from the uninvolved side  Use a thimble when sewing  Avoid rubbing, scrubbing, waxing of involved limb  Take care to avoid bites and scratches from bugs and pets  Avoid walking barefoot  Use extreme caution when peeling shrimp, crawfish, crabs      It is important to avoid extreme heat exposure. When you increase circulation to a swollen limb, the chance of additional fluid leaking into the spaces of your body's tissue increases, which causes more swelling.  Avoid prolonged exposure to sunlight  Use sunscreen when outdoors  Use oven mitts when reaching into an oven  Avoid hot tubs and saunas  Avoid spending prolonged time under a hot hair dryer  Look for clothing made of breathable fabrics for hot weather      It is important to avoid anything that will reduce circulation of blood flow, since reduction of blood flow can lead to a restriction of lymph flow.  Have blood pressure measured in the uninvolved limb  Avoid tight or  constricting clothing such as tight sleeves and waistbands  Avoid tight watches and jewelry  Avoid sitting with your legs crossed        In general, practice healthy living habits:  Avoid alcohol and smoking  Maintain your ideal weight  Keep sodium intake at a moderate level (less than 2,400 mg/day)  Eat moderate amount of protein to maintain tissue health. Contact your primary doctor for a referral to [facility]  When you travel by air, wear your compression garment during the flight  Wash hands frequently and dry thoroughly  Keep skin moisturized and free from cracking or peeling. For sensitive skin, Eucerin lotion is a good option.   Avoid hangnails and do not pull or bite cuticles  Take care of all scratches, cuts, bites, immediately with an antibiotic cream or ointment (Neosporin, Bacitracin, Triple ABX) and cover with a clean bandage.       Call your doctor as soon as you suspect infection. Be aware of the signs of infection:  Increased swelling  Redness (generalized or localized small, red dots)  Heat (arm/leg feels warm, or you have fever)  Pain       MANAGING YOUR BANDAGES:  Although your bandages are inconvenient, they are an important part of the lymphedema program. You will receive one set of bandages for participating in [facility]. It is important to take care of these bandages during your treatment. You should not get your bandages wet! For bandages on the arm, it is possible to protect the arm bandages with a plastic bag when in a tub. No Showers with bandages on arm or leg!          WHAT TO EXPECT DURING LYMPHEDEMA TREATMENT:  Wear loose short sleeves for arm treatment. Sleeveless tops work well too. Wear skirts or baggy shorts or loose fitting/baggy pants for leg treatment.  If your leg(s) are being bandaged, find the widest shoe(s) possible. Wide slip-on shoes like crocks usually work. If you do not have a shoe that will fit over the bandages, a  disposable shoe cover will be provided to you.  Treatment sessions will last 45 minutes to 60 minutes and will consist of Manual Lymphatic Drainage (MLD), MLD training, vasopneumatic compression of the extremity as needed and bandaging of the swollen extremity.   You may be asked to remove clothes so that treatments can be performed efficiently. You will be provided with a sheet to drape yourself.   Once the compression bandage is applied, you will be expected to wear the bandage until the next visit. The bandage will loosen as the lymph fluid is pushed out of the limb. Do not attempt to unwrap and re-wrap the limb unless you have been trained to do so. In the event that the bandages are so loose that they fall off, remove them and put everything in a bag and bring it to the next therapy session.  While wearing the bandages on the arm or leg, keep fingers and toes moving, bend your elbow/knee, wrist/ankle frequently. Elevate the limb above the heart to relieve any throbbing or discomfort.  If the discomfort is unbearable, you may try to remove the outermost bandage. If this does not work, remove all bandages and bring them with you to the next therapy session.   Depending on severity of the swelling, expect weekly treatment for 2-5 days per week x 4-6 weeks.  An exercise program will be created based on individual need.  You will be fitted for a permanent compression garment (sleeve for arm/stocking for leg) prior to discharge from therapy. This permanent compression garment will take the place of the bandages and need to be worn as prescribed by your therapist.    CARE OF YOUR COMPRESSION GARMENT  Wear the compression garment daily as prescribed by your therapist. You will not wear the compression garment when sleeping. Put on the garment soon after you wake up and remove it before going to bed. This will decrease the risk of the lymph fluid returning to the extremity.   Use donning gloves/garden gloves to  the  compression garment. This will decrease tearing the material and make it easier to  the material.  If you feel the garment is too tight, notify your therapist.  It is best to have a wear one, wash one garment if possible. Check with your insurance provided to see what they will cover for lymphedema supplies.  Compression garments can be expected to last 3-6 months before they need to be replaced. When the compression garment loses its compressive ability, swelling from lymph fluid can return in the limb even if you are wearing the garment daily.   Consult your therapist or compression representative in the event you need to be re-measured.     Exercise:    Your therapist will instruct you in an exercise program tailored to you. Muscle contractions help promote the flow of lymph out of the swollen limb.  To prevent an increase in swelling of the limb, it is always best practice to wear your compression garment on the affected extremity when exercising.    **IF YOUR ARM IS BANDAGED, OPEN/CLOSE YOUR HAND AND PERFORM WRIST CIRCLES THROUGHOUT THE DAY.    Perform all exercises below with good, erect posture. Stand with feet shoulder width apart, with your knees slightly bent. Repeat each exercise 10-20 reps up to 3x/week               Shoulder Blade Squeeze      Backwards Shoulder Roll  Neck Rotation    Neck Side Bends    Chest Press with Stick    Elbow Bends with Stick      Ankle Pumps (and wiggle toes; do frequently)    Hamstring Curls    Heel Raises  Knee Extension    Knee Bends    Lunges  Knee Raises    Trunk Rotation    The following are a list of resources that may be helpful for you.  Lymphnotes.com: online information source for those having or are at risk of developing Lymphedema. This site is also for family and friend's who care for those with lymphedema  NLN (National Lymphedema Network): an organization dedicated to promoting the awareness of lymphedema and empowering people with lymphedema to live life  to the fullest. Lymphnet.org  LEARN (Lymphedema Education and Research Network): answers to frequently asked questions about Lymphedema, Lipedema, and the lymphatic system. Lymphaticnetwork.org  How Mitzi Got her Rack Back, A Breast Cancer Memoir by Judy Gold - a book that gives a laugh out loud humor to her adventure, along with poignant moments of self-discovery as she blogs her way to good health. (available on Minh and paperback)  100 Questions and Answers About Lymphedema by Sammi Mckinney, Diana Smtih, and Sylvia Plasencia - provides clear, straightforward answers to your questions about lymphedema. (available on paperback)  Podcasts: Lymphedema Podcast, Livedema Podcast, Lymph Logic to name a few      If you need further assistance or have questions concerning your treatment, please do not hesitate to call Jailene Zaman (therapist) at  [phone] (phone number).

## 2023-12-18 NOTE — PLAN OF CARE
[facility]  Physical Therapy Initial Evaluation    Name: Tasha Hawley  Clinic Number: [id number]    Therapy Diagnosis:   Encounter Diagnosis   Name Primary?    Lymphedema      Physician: Marlon Wylie MD    Physician Orders: PT Eval and Treat - lymphedema  Medical Diagnosis from Referral: I89.0 (ICD-10-CM) - Lymphedema   Evaluation Date: 12/11/2023  Authorization Period Expiration: 12/20/2023  Plan of Care Expiration: 1/22/2024  Visit # / Visits authorized: 1/ 1    Time In: 3:30pm- pt late   Time Out: 4:40pm  Total Billable Time: 60 minutes    Precautions: Standard, blood thinners, Fall, and CHF    Subjective   Date of onset: unknown   History of current condition - Tasha reports: She has people come to the house and wrap her legs. She also has someone come for her catheter. She has open sores on both legs. She has someone come every other week sometimes but will come  sooner if it is bad.     Last time she was wrapped was about two days ago     Pt denies  KF, DM and CA.   Pt reports CHF   Fluid pill: yes   Blood thinner: Yes     Medical History:   Past Medical History:   Diagnosis Date    Anxiety     Arthritis     Bilateral lower extremity edema     severe chronic    Carotid artery occlusion     Cataract     CHF (congestive heart failure)     Coronary artery disease     subtotalled LAD with collateral    Depression     Fever blister     Hard of hearing     Hypokalemia 01/09/2023    Hyponatremia 02/04/2022    Hypothyroid     Iron deficiency anemia     Lumbar radiculopathy     with chronic pain    Ocular migraine     Other emphysema 05/22/2023    Renal disorder     Sleep apnea     cpap       Surgical History:   Tasha Hawley  has a past surgical history that includes Hysterectomy (1975); Back surgery; pain pump placement; Spine surgery (5-13-13); Cataract extraction w/  intraocular lens implant (Left); Spinal cord stimulator removal; Esophagogastroduodenoscopy (N/A, 5/23/2018);  Tonsillectomy; Adenoidectomy; Cardiac catheterization (2016); Cystoscopic litholapaxy (N/A, 6/27/2019); Cystoscopic litholapaxy (N/A, 9/3/2019); Replacement of catheter (N/A, 10/31/2019); Cystoscopy (N/A, 7/13/2021); Injection of botulinum toxin type A (7/13/2021); Cystoscopy (11/16/2021); Injection of botulinum toxin type A (11/16/2021); Cystoscopy (7/19/2022); Cystoscopy with injection of periurethral bulking agent (7/19/2022); Injection of botulinum toxin type A (7/19/2022); Removal of bone spur of foot (Bilateral, 9/16/2022); cystoscopy,with botulinum toxin injection (N/A, 12/13/2022); insertion, suprapubic catheter (N/A, 12/13/2022); cystoscopy,with botulinum toxin injection (N/A, 3/28/2023); Cystoscopy with injection of periurethral bulking agent (N/A, 3/28/2023); Esophagogastroduodenoscopy (N/A, 6/23/2023); Replacement of baclofen pump (N/A, 8/2/2023); Injection of botulinum toxin type A (N/A, 11/14/2023); insertion, suprapubic catheter (N/A, 11/14/2023); and Cystoscopy with injection of periurethral bulking agent (N/A, 11/14/2023).    Medications:   Tasha has a current medication list which includes the following prescription(s): amitriptyline, amoxicillin-clavulanate 875-125mg, aspirin, atorvastatin, butalbital-acetaminophen-caffeine -40 mg, clindamycin, cyanocobalamin (vitamin b-12), darifenacin, diclofenac sodium, docusate sodium, fludrocortisone, fluoxetine, fluticasone propionate, furosemide, hydrocodone-acetaminophen, hydrocortisone, hydroxyzine, intrathecal pain pump compound, ketorolac, levothyroxine, lidocaine, liothyronine, nitroglycerin, nystatin, ondansetron, pantoprazole, potassium chloride, promethazine, quetiapine, senna, tiotropium bromide, tramadol, and trazodone, and the following Facility-Administered Medications: sodium chloride 0.9%, gentamicin (GARAMYCIN) 320 mg in sodium chloride 0.9% 100 mL IVPB, and oxacillin 2 g in sodium chloride 0.9 % 100 mL IVPB (MB+).    Allergies:    Review of patient's allergies indicates:   Allergen Reactions    (d)-limonene flavor      Other reaction(s): difficult intubation  Other reaction(s): Difficulty breathing    Benadryl [diphenhydramine hcl] Shortness Of Breath    Fentanyl Itching, Nausea And Vomiting and Swelling             Imitrex [sumatriptan succinate] Shortness Of Breath    Oxycodone-acetaminophen Shortness Of Breath    Sulfamethoxazole-trimethoprim Anaphylaxis    Topiramate Shortness Of Breath    Vancomycin Anaphylaxis     Rash    Butorphanol tartrate     Evening primrose (oenothera biennis)     Latex     Pregabalin Other (See Comments)     tremors    Propoxyphene n-acetaminophen      Other reaction(s): Difficulty breathing    Sumatriptan     White petrolatum-zinc     Zinc acetate     Zinc oxide-white petrolatum      Other reaction(s): Difficulty breathing    Latex, natural rubber Itching and Rash    Phenytoin Rash and Other (See Comments)     Trouble breathing        Imaging,   There is no evidence of a right lower extremity DVT.    There is no evidence of a left lower extremity DVT.    The left superficial femoral middle vein is not well visualized.    The right superficial femoral middle vein is not well visualized.    No evidence of superficial or deep reflux.  However the study is technically difficult       Surgery: foot sx   Radiation:  none  Chemotherapy: none   Previous Lymphedema Treatment: wrapping by  nurse at home   Prior Therapy: unknown   Social History: lives with     Environmental barriers: falls often in home, reports 4x a week    Abuse/Neglect: Pt shows no visible signs of abuse/neglect   Nutritional status: Pt reports no nutritional needs    Educational needs: Pt reports no educational needs    Spiritual/Cultural: Pt reports no spiritual/ cultural needs     Fall risk: last night, had to be helped up by , falling 4x  a week    Occupation: none  Prior Level of Function: needs assistance   Current Level of  Function: yes, can't get in shower, needs help going to the bathroom   Gait: uses the walker, have a wheelchair    Transfers:Min A    Bed Mobility: Min A      Pain and Swelling: Pt reports significant BLE pain, mostly in feet but was not able to get numerical values     Pts goals: reduce feet pain and swelling     Objective     Pt arrives with , in WC with catheter   BLE wrapped in coban, removed pt's LLE bandages to assess leg, too low on time to remove RLE bandaging   Amount of Swelling/Location of Swelling:    Skin Integrity: posterior LLE blister covered with xeroform, no signs of infection. LLE foot wound covered with gauze, left in place to avoid tearing skin    Palpation/Texture: pitting edema LLE foot    + B Stemmer Sign  - B Gianfranco's Sign  Circulation: intact      Posture: FHP          Girth Measurements (in centimeters): unable to obtain due to time, will get measurement next visit         CMS Impairment/Limitation/Restriction for FOTO LE edema Survey  Limitation: not performed     Therapist reviewed FOTO scores for Tasha Hawley on 12/11/2023.   FOTO documents entered into [employer] - see Media section.       TREATMENT     Total Treatment time separate from Evaluation: 30 minutes      Tasha received the following manual therapy techniques: bandaging were applied to the: LLE for 15 minutes, including:  Education and training in compression needs.  Complete Decongestive Therapy components and management with goals and plan of care review.    MULTILAYERED BANDAGING:  issued supplies and bandaged L LE with cotton stockinette, komprex section dorsum of foot,  2 cellona rolls ankle to knee, 1-8cm and 2- 10cm rosidal rolls foot to knee, to leave intact 12-24 hrs as tolerated, discontinue with any problems, return rolled bandages next session. Wash and wear schedules confirmed.         Self Care/Home Management training for 15 minutes including:    Pt was educated in potential compression needs.  Demo of  products including socks, garments, and Inelastic Velcro wraps.   Discussed cost/coverage and authorization per insurance with Durable Medical Equipment(DME) provider.  Compression require orders from referring provider and coverage or purchase of products from DME or self order.      Discussed wear schedule, don/doff, wash and management of products.  Size and compression class and AM/PM needs.    Consideration for tubigrip as form of temporary compression use until securing compression needs.   Consideration for shorts/tights or capris style compression to compliment lower leg compression to support size/shape of LE.       Informed insurance coverage of compression is per DME provider and typically Medicare and Medicare group plans may not cover cost beyond pair of standard sized knee high garments.   Commitment to attendance as well as commitment to securing compression needs is critical to edema management.      Home Exercises and Patient Education Provided  Education provided:   - lymph booklet  - Pt was educated in lymphedema etiology and management plans.  Pt was provided with written risk reductions and precautions for managing lymphedema.      This patient is in agreement to participate in Lymphedema treatment.    Written Home Exercises Provided: yes.  Exercises were reviewed and Tasha was able to demonstrate them prior to the end of the session.  Tasha demonstrated fair  understanding of the education provided.     See EMR under Patient Instructions for exercises provided 12/11/2023.    Assessment   Tasha is a 68 y.o. female referred to outpatient Physical Therapy with a medical diagnosis of I89.0 (ICD-10-CM) - Lymphedema, not elsewhere classified. This patient presents s/p CVI, chronic wounds    resulting in: lymphedema of the BLEs, increased pain, as well as difficulty performing walking, ADLs , compression needs, and placing the pt at higher risk of infection.    Pt presents to clinic with BLE swelling  with reports of significant pain and difficulty with ambulation. Pt is receiving home health care where her legs are wrapped but pt's MD has requested pt transition to OP lymph PT and pt and her  are aware of this. Pt and her  have limited details on HH treatment and how often they were coming. Pt displayed significant fatigue and was sleeping during eval at several times. Pt and her  report this is not abnormal for pt and that she had little sleep the night before. Pt denied chest pain or SOB.  Pt's legs were wrapped in koban when they arrived for eval and only LLE bandaging was removed to access due to time. Due to time, girth measurements were not able to be taken. Pt has a wound on her L calf that is covered in xeroform. Pt's foot was covered by gauze and pt's  reports that is a long standing wound. Dressings were kept on today to avoid lifting skin and due to unknown nature of dressings. Pt's LLE was bandaged with short stretch bandages and pt reported no pain from bandaging. Pt was given gauze to cover pt's potential wounds and edemawear to cover pt's leg and provide compression.  Pt reports difficulty with transportation and mobility, which may hinder pt's treatment in this setting. Pt also reports regular falls and so special consideration will need to be taken to avoid furthering hindering pt's balance. Pt's  reports WC in the house and pt was urged to use WC as risk of significant injury due to her falls is high. Pt and  were educated on components of lymphedema therapy, skin care, and need for compression. Pt would benefit from therapy to decrease swelling, aid in finding compression, and preparing pt for home management     Pt prognosis is Fair.   Pt will benefit from skilled outpatient Physical Therapy to address the deficits stated above and in the chart below, provide pt/family education, and to maximize pt's level of independence.     Plan of care discussed  with patient: Yes  Pt's spiritual, cultural and educational needs considered and patient is agreeable to the plan of care and goals as stated below:     Medical Necessity is demonstrated by the following  History  Co-morbidities and personal factors that may impact the plan of care [] LOW: no personal factors / co-morbidities  [] MODERATE: 1-2 personal factors / co-morbidities  [x] HIGH: 3+ personal factors / co-morbidities    Moderate / High Support Documentation:     Emphysema  DEP  CHF  CAD  anxiety     Examination  Body Structures and Functions, activity limitations and participation restrictions that may impact the plan of care [] LOW: addressing 1-2 elements  [] MODERATE: 3+ elements  [x] HIGH: 4+ elements (please support below)    Moderate / High Support Documentation: leg swelling, leg pain, skin integrity, limited with transfers, frequent falls      Clinical Presentation [] LOW: stable  [] MODERATE: Evolving  [x] HIGH: Unstable     Decision Making/ Complexity Score: high       Anticipated Barriers for therapy: transportation     The following goals were discussed with the patient and patient is in agreement with them as to be addressed in the treatment plan.     Short Term Goals: (6 weeks)  1. Patient will show decreased girth in B LE by up to 1 cm to allow for LE symmetry, shoe and clothing choice, and ability to apply needed compression.  (progressing, not met)   2. Patient will demonstrate 100% knowledge of lymphedema precautions and signs of infection to allow for reduced lymphedema risk, infection risk, and/or exacerbation of condition.  (progressing, not met)  3. Patient or caregiver will perform self-bandaging techniques and/or wearing of compression garments to allow for lymphatic drainage support, skin elasticity, and reduction in shape and size of limb. (progressing, not met)  4. Patient will perform self lymph drainage techniques to areas within reach to enhance lymphatic drainage and skin  condition.  (progressing, not met)  5. Patient will tolerate daily activities with multilayered bandaging to allow for lymphatic and venous support.  (progressing, not met)    Long Term Goals: (12  weeks)  1. Patient will show decreased girth in B LE by up to 2 cm  to allow for LE symmetry, shoe and clothing choice, and ability to apply needed compression daily.  (progressing, not met)  2. Patient will show reduction in density to mild or less with improved contour of limb to allow for cosmesis, LE symmetry, infection risk reduction, and clothing and compression choice.   (progressing, not met)  3. Patient to vernell/doff compression garment with daily compliance to assist in lymphedema management, skin elasticity, and tissue density.  (progressing, not met)  4. Pt to show improved postural awareness and alignment.  (progressing, not met)  5. Pt to be I and compliant with HEP to allow for increased function in affected limb.   (progressing, not met)  Plan   Plan of care Certification: 12/11/2023 to 1/22/2024.    Outpatient Physical Therapy 2 times weekly for 6 weeks to include the following interventions: Gait Training, Manual Therapy, Neuromuscular Re-ed, Patient Education, Self Care, Therapeutic Activities, and Therapeutic Exercise. Complete Decongestive Therapy- compression and home equipment needs to be addressed and assisted.    Pt may be seen by a PTA as part of the Rehab treatment team.  Plan of Care was discussed with Mounika Degroot, ROCKY Zaman, PT

## 2023-12-19 ENCOUNTER — HOSPITAL ENCOUNTER (EMERGENCY)
Facility: HOSPITAL | Age: 68
Discharge: HOME | End: 2023-12-20
Attending: EMERGENCY MEDICINE
Payer: MEDICARE

## 2023-12-19 ENCOUNTER — DOCUMENT SCAN (OUTPATIENT)
Dept: HOME HEALTH SERVICES | Facility: HOSPITAL | Age: 68
End: 2023-12-19
Payer: MEDICARE

## 2023-12-19 ENCOUNTER — TELEPHONE (OUTPATIENT)
Dept: UROLOGY | Facility: CLINIC | Age: 68
End: 2023-12-19
Payer: MEDICARE

## 2023-12-19 DIAGNOSIS — M79.89 LEG SWELLING: ICD-10-CM

## 2023-12-19 DIAGNOSIS — Z86.19 HISTORY OF STAPH INFECTION: ICD-10-CM

## 2023-12-19 DIAGNOSIS — F11.90 CHRONIC, CONTINUOUS USE OF OPIOIDS: ICD-10-CM

## 2023-12-19 DIAGNOSIS — M77.32 CALCANEAL SPUR OF LEFT FOOT: ICD-10-CM

## 2023-12-19 DIAGNOSIS — R52 INTRACTABLE PAIN: ICD-10-CM

## 2023-12-19 DIAGNOSIS — I89.0 LYMPHEDEMA: ICD-10-CM

## 2023-12-19 DIAGNOSIS — J18.1 LEFT LOWER LOBE CONSOLIDATION: Primary | ICD-10-CM

## 2023-12-19 DIAGNOSIS — N39.0 URINARY TRACT INFECTION WITHOUT HEMATURIA, SITE UNSPECIFIED: ICD-10-CM

## 2023-12-19 DIAGNOSIS — M14.679 CHARCOT ANKLE, UNSPECIFIED LATERALITY: ICD-10-CM

## 2023-12-19 PROCEDURE — 99285 EMERGENCY DEPT VISIT HI MDM: CPT | Mod: 25

## 2023-12-19 PROCEDURE — 93005 ELECTROCARDIOGRAM TRACING: CPT

## 2023-12-19 NOTE — TELEPHONE ENCOUNTER
----- Message from Zoe Mcgarry LPN sent at 12/18/2023  4:17 PM CST -----  Regarding: FW: Consult/Advice  Contact: @ [phone]    ----- Message -----  From: Ramiro Ford  Sent: 12/18/2023  12:19 PM CST  To: [facility] Staff  Subject: Consult/Advice                                   Pt is calling in because she is having a real bad yeast infection/rash. Pt is wanting to know the provider can prescribe a pill for her because she also bought a cream last night for it. Pt would like for it be called in today. Please call pt to advise         [facility] [place] - [place] - [address]  [address]  [place] [address]  Phone: [phone] Fax: [phone]

## 2023-12-20 ENCOUNTER — TELEPHONE (OUTPATIENT)
Dept: INTERNAL MEDICINE | Facility: CLINIC | Age: 68
End: 2023-12-20
Payer: MEDICARE

## 2023-12-20 VITALS
WEIGHT: 194 LBS | SYSTOLIC BLOOD PRESSURE: 128 MMHG | TEMPERATURE: 98 F | BODY MASS INDEX: 33.3 KG/M2 | DIASTOLIC BLOOD PRESSURE: 62 MMHG | HEART RATE: 61 BPM | RESPIRATION RATE: 18 BRPM | OXYGEN SATURATION: 96 %

## 2023-12-20 LAB
BACTERIA #/AREA URNS HPF: ABNORMAL /HPF
BILIRUB UR QL STRIP: NEGATIVE
CLARITY UR: CLEAR
COLOR UR: YELLOW
GLUCOSE UR QL STRIP: NEGATIVE
HGB UR QL STRIP: ABNORMAL
KETONES UR QL STRIP: NEGATIVE
LEUKOCYTE ESTERASE UR QL STRIP: ABNORMAL
MICROSCOPIC COMMENT: ABNORMAL
NITRITE UR QL STRIP: POSITIVE
PH UR STRIP: 6 [PH] (ref 5–8)
PROT UR QL STRIP: NEGATIVE
RBC #/AREA URNS HPF: 4 /HPF (ref 0–4)
SP GR UR STRIP: 1.01 (ref 1–1.03)
SQUAMOUS #/AREA URNS HPF: 0 /HPF
URN SPEC COLLECT METH UR: ABNORMAL
UROBILINOGEN UR STRIP-ACNC: NEGATIVE EU/DL
WBC #/AREA URNS HPF: 49 /HPF (ref 0–5)
YEAST URNS QL MICRO: ABNORMAL

## 2023-12-20 PROCEDURE — 87086 URINE CULTURE/COLONY COUNT: CPT | Performed by: EMERGENCY MEDICINE

## 2023-12-20 PROCEDURE — 87186 SC STD MICRODIL/AGAR DIL: CPT | Performed by: EMERGENCY MEDICINE

## 2023-12-20 PROCEDURE — 87088 URINE BACTERIA CULTURE: CPT | Performed by: EMERGENCY MEDICINE

## 2023-12-20 PROCEDURE — 81000 URINALYSIS NONAUTO W/SCOPE: CPT | Performed by: EMERGENCY MEDICINE

## 2023-12-20 PROCEDURE — 87150 DNA/RNA AMPLIFIED PROBE: CPT | Performed by: EMERGENCY MEDICINE

## 2023-12-20 PROCEDURE — 87077 CULTURE AEROBIC IDENTIFY: CPT | Performed by: EMERGENCY MEDICINE

## 2023-12-20 RX ORDER — LEVOFLOXACIN 500 MG/1
500 TABLET, FILM COATED ORAL DAILY
Qty: 7 TABLET | Refills: 0 | Status: SHIPPED | OUTPATIENT
Start: 2023-12-20 | End: 2023-12-27

## 2023-12-20 NOTE — TELEPHONE ENCOUNTER
Spoke to PT Ms. Dent states that Ms. Hawley is been very non compliant with going to out patient PT. Ms. Dent wanted to update you in regards to maybe having to go back to having a home health nurse.  Patient is coming to see you on 12/22. Just an FYI

## 2023-12-20 NOTE — ED PROVIDER NOTES
Encounter Date: 12/19/2023       History     Chief Complaint   Patient presents with    Leg Swelling     Patient states she has a hx of lymphedema and her leg welling has increased recently, causing more pain. Denies hx of CHF, chest pain, acute dyspnea, fever. Patient on home o2. Patient also having more difficulty ambulating due to the pain and had a fall yesterday.      Patient is a 69 yo F with a pmhx lymphedema, chronic pain, suprapubic catheter who presents to the ED with the complaint of bilateral feet pain and associated leg swelling. Per  at bedside, pt with more frequent falls he believes is 2/2 to patient with worsening LE swelling and pain. He also reports pt with foul smelling urine from her suprapubic catheter making him concerned for possible infection. The patient is on chronic O2 but she denies any associated CP, SOB, cough, fever, chills or falls.       Review of patient's allergies indicates:   Allergen Reactions    (d)-limonene flavor      Other reaction(s): difficult intubation  Other reaction(s): Difficulty breathing    Benadryl [diphenhydramine hcl] Shortness Of Breath    Fentanyl Itching, Nausea And Vomiting and Swelling             Imitrex [sumatriptan succinate] Shortness Of Breath    Oxycodone-acetaminophen Shortness Of Breath    Sulfamethoxazole-trimethoprim Anaphylaxis    Topiramate Shortness Of Breath    Vancomycin Anaphylaxis     Rash    Butorphanol tartrate     Evening primrose (oenothera biennis)     Latex     Pregabalin Other (See Comments)     tremors    Propoxyphene n-acetaminophen      Other reaction(s): Difficulty breathing    Sumatriptan     White petrolatum-zinc     Zinc acetate     Zinc oxide-white petrolatum      Other reaction(s): Difficulty breathing    Latex, natural rubber Itching and Rash    Phenytoin Rash and Other (See Comments)     Trouble breathing     Past Medical History:   Diagnosis Date    Anxiety     Arthritis     Bilateral lower extremity edema      severe chronic    Carotid artery occlusion     Cataract     CHF (congestive heart failure)     Coronary artery disease     subtotalled LAD with collateral    Depression     Fever blister     Hard of hearing     Hypokalemia 01/09/2023    Hyponatremia 02/04/2022    Hypothyroid     Iron deficiency anemia     Lumbar radiculopathy     with chronic pain    Ocular migraine     Other emphysema 05/22/2023    Renal disorder     Sleep apnea     cpap     Past Surgical History:   Procedure Laterality Date    ADENOIDECTOMY      BACK SURGERY      x 12    CARDIAC CATHETERIZATION  2016    subtotalled LAD with right to left collaterals    CATARACT EXTRACTION W/  INTRAOCULAR LENS IMPLANT Left     Dr Coleman     CYSTOSCOPIC LITHOLAPAXY N/A 6/27/2019    Procedure: CYSTOLITHOLAPAXY;  Surgeon: Shireen Mayo MD;  Location: [facility] OR 2ND FLR;  Service: Urology;  Laterality: N/A;    CYSTOSCOPIC LITHOLAPAXY N/A 9/3/2019    Procedure: CYSTOLITHOLAPAXY;  Surgeon: Shireen Mayo MD;  Location: [facility] OR 2ND FLR;  Service: Urology;  Laterality: N/A;    CYSTOSCOPY N/A 7/13/2021    Procedure: CYSTOSCOPY;  Surgeon: Shireen Mayo MD;  Location: [facility] OR 1ST FLR;  Service: Urology;  Laterality: N/A;    CYSTOSCOPY  11/16/2021    Procedure: CYSTOSCOPY;  Surgeon: Shireen Mayo MD;  Location: [facility] OR 1ST FLR;  Service: Urology;;    CYSTOSCOPY  7/19/2022    Procedure: CYSTOSCOPY;  Surgeon: Shireen Mayo MD;  Location: [facility] OR 1ST FLR;  Service: Urology;;    CYSTOSCOPY WITH INJECTION OF PERIURETHRAL BULKING AGENT  7/19/2022    Procedure: CYSTOSCOPY, WITH PERIURETHRAL BULKING AGENT INJECTION-MACROPLASTIQUE;  Surgeon: Shireen Mayo MD;  Location: [facility] OR 1ST FLR;  Service: Urology;;    CYSTOSCOPY WITH INJECTION OF PERIURETHRAL BULKING AGENT N/A 3/28/2023    Procedure: CYSTOSCOPY, WITH PERIURETHRAL BULKING AGENT INJECTION;  Surgeon: Shireen Mayo MD;  Location: [facility] OR 1ST FLR;  Service: Urology;  Laterality: N/A;  [place]    CYSTOSCOPY  WITH INJECTION OF PERIURETHRAL BULKING AGENT N/A 11/14/2023    Procedure: CYSTOSCOPY, WITH PERIURETHRAL BULKING AGENT INJECTION;  Surgeon: Shireen Mayo MD;  Location: [facility] OR 1ST FLR;  Service: Urology;  Laterality: N/A;    CYSTOSCOPY,WITH BOTULINUM TOXIN INJECTION N/A 12/13/2022    Procedure: CYSTOSCOPY,WITH BOTULINUM TOXIN INJECTION;  Surgeon: Shireen Mayo MD;  [facility];  Service: Urology;  Laterality: N/A;  300 U    CYSTOSCOPY,WITH BOTULINUM TOXIN INJECTION N/A 3/28/2023    Procedure: CYSTOSCOPY,WITH BOTULINUM TOXIN INJECTION;  Surgeon: Shireen Mayo MD;  Location: [facility] OR 1ST FLR;  Service: Urology;  Laterality: N/A;  45 MIN.    300 UNITS    ESOPHAGOGASTRODUODENOSCOPY N/A 5/23/2018    Procedure: ESOPHAGOGASTRODUODENOSCOPY (EGD);  Surgeon: Prince Vance MD;  Location: [facility] ENDO (4TH FLR);  Service: Endoscopy;  Laterality: N/A;  r/s 'd per Dr. Vance due to family emergency- ER    ESOPHAGOGASTRODUODENOSCOPY N/A 6/23/2023    Procedure: EGD (ESOPHAGOGASTRODUODENOSCOPY);  Surgeon: Juaquin Barry MD;  Location: [facility] (2ND FLR);  Service: Endoscopy;  Laterality: N/A;  Refferal: PRINCE VANCE  Order  tele enc 5/18/23  dx: history of a Nissen fundoplication  Additional Scheduling Information:  On home oxygen 2nd floor for airway protection also with a pain pump elevated BMI close to 40   Prep Gastroparesis   ins    HYSTERECTOMY  1975    endometriosis    INJECTION OF BOTULINUM TOXIN TYPE A  7/13/2021    Procedure: INJECTION, BOTULINUM TOXIN, 200units;  Surgeon: Shireen Mayo MD;  [facility];  Service: Urology;;    INJECTION OF BOTULINUM TOXIN TYPE A  11/16/2021    Procedure: INJECTION, BOTULINUM TOXIN, 200units;  Surgeon: Shireen Mayo MD;  [facility];  Service: Urology;;    INJECTION OF BOTULINUM TOXIN TYPE A  7/19/2022    Procedure: INJECTION, BOTULINUM TOXIN, 300 units ;  Surgeon: Shireen Mayo MD;  Location: [facility] OR [address] [place] [address];   Service: Urology;;    INJECTION OF BOTULINUM TOXIN TYPE A N/A 11/14/2023    Procedure: INJECTION, BOTULINUM TOXIN, TYPE A;  Surgeon: Shireen Mayo MD;  Location: [facility] OR [address] [place] [address];  Service: Urology;  Laterality: N/A;    INSERTION, SUPRAPUBIC CATHETER N/A 12/13/2022    Procedure: INSERTION, SUPRAPUBIC CATHETER;  Surgeon: Shireen Mayo MD;  [facility];  Service: Urology;  Laterality: N/A;  exchange    INSERTION, SUPRAPUBIC CATHETER N/A 11/14/2023    Procedure: INSERTION, SUPRAPUBIC CATHETER;  Surgeon: Shireen Mayo MD;  Location: [facility] OR [address] [place] [address];  Service: Urology;  Laterality: N/A;  EXCHANGE of s/p tube    pain pump placement      SQ Dilaudid Pump managed by Dr. Hillman, Pain Management    REMOVAL OF BONE SPUR OF FOOT Bilateral 9/16/2022    Procedure: EXCISION ARTHRITIC BONE, BILATERAL FOOT;  Surgeon: NICOLA Mcgrath;  Location: [facility];  Service: Podiatry;  Laterality: Bilateral;    REPLACEMENT OF BACLOFEN PUMP N/A 8/2/2023    Procedure: REPLACEMENT, BACLOFEN PUMP;  Surgeon: Smitha Condon MD;  Location: [facility] OR [address] [place] [address];  Service: Neurosurgery;  Laterality: N/A;  Anes: Gen  Blood: Type & Screen  Pos: Left Lat  Intrathecal Pump  Bed: Reg Reversed  Rad: C-arm  Pump: 40 cc, [employer]tronics    REPLACEMENT OF CATHETER N/A 10/31/2019    Procedure: REPLACEMENT, CATHETER-SUPRAPUBIC;  Surgeon: Shireen Mayo MD;  Location: [facility] OR [address] [place] [address];  Service: Urology;  Laterality: N/A;    SPINAL CORD STIMULATOR REMOVAL      before [place]    SPINE SURGERY  5-13-13    CERVICAL FUSION    TONSILLECTOMY       Family History   Problem Relation Age of Onset    Cancer Mother 55        breast    Cancer Father         esophagus,had laryngectomy    Esophageal cancer Father     Parkinsonism Maternal Grandmother     Tremor Maternal Grandmother     No Known Problems Brother     No Known Problems Brother     Heart disease Maternal Uncle     Colon cancer Maternal Uncle         Less than 60    No Known Problems  "Sister     No Known Problems Maternal Aunt     Cirrhosis Paternal Aunt         ETOH    Liver disease Paternal Aunt         ETOH    Liver disease Paternal Uncle         ETOH    Cirrhosis Paternal Uncle         ETOH    No Known Problems Maternal Grandfather     No Known Problems Paternal Grandmother     No Known Problems Paternal Grandfather     Melanoma Neg Hx     Amblyopia Neg Hx     Blindness Neg Hx     Cataracts Neg Hx     Diabetes Neg Hx     Glaucoma Neg Hx     Hypertension Neg Hx     Macular degeneration Neg Hx     Retinal detachment Neg Hx     Strabismus Neg Hx     Stroke Neg Hx     Thyroid disease Neg Hx     Celiac disease Neg Hx     Colon polyps Neg Hx     Cystic fibrosis Neg Hx     Crohn's disease Neg Hx     Inflammatory bowel disease Neg Hx     Liver cancer Neg Hx     Rectal cancer Neg Hx     Stomach cancer Neg Hx     Ulcerative colitis Neg Hx     Lymphoma Neg Hx      Social History     Tobacco Use    Smoking status: Never    Smokeless tobacco: Never   Substance Use Topics    Alcohol use: Never    Drug use: Yes     Types: Marijuana     Comment: "medical marijuana gummies"     Review of Systems   Constitutional:  Negative for chills and fever.   Respiratory:  Negative for cough and shortness of breath.    Cardiovascular:  Positive for leg swelling. Negative for chest pain and palpitations.   Gastrointestinal:  Negative for abdominal pain, nausea and vomiting.   Genitourinary:  Negative for dysuria and frequency.   Musculoskeletal:  Positive for arthralgias. Negative for back pain.   Neurological:  Negative for dizziness.   Psychiatric/Behavioral:  Negative for agitation and confusion.        Physical Exam     Initial Vitals [12/19/23 1948]   BP Pulse Resp Temp SpO2   (!) 122/58 60 18 97.6 °F (36.4 °C) 100 %      MAP       --         Physical Exam    Nursing note and vitals reviewed.  Constitutional: She appears well-developed and well-nourished. No distress.   Well-appearing   Non toxic   No acute distress " noted   Chronically ill appearing    HENT:   Head: Normocephalic and atraumatic.   Right Ear: External ear normal.   Left Ear: External ear normal.   Eyes: EOM are normal. Pupils are equal, round, and reactive to light.   Neck: Neck supple.   Normal range of motion.  Cardiovascular:  Normal rate, regular rhythm, normal heart sounds and intact distal pulses.           Pulmonary/Chest: Breath sounds normal.   Abdominal: Abdomen is soft. Bowel sounds are normal.   Musculoskeletal:         General: Edema present.      Cervical back: Normal range of motion and neck supple.      Comments: Changes to the bilateral LE consistent with chronic lymphedema, venous stasis     Neurological: She is alert and oriented to person, place, and time. GCS score is 15. GCS eye subscore is 4. GCS verbal subscore is 5. GCS motor subscore is 6.   Skin: Skin is warm. Capillary refill takes less than 2 seconds.   Psychiatric: She has a normal mood and affect.         ED Course   Procedures  Labs Reviewed   CULTURE, URINE - Abnormal; Notable for the following components:       Result Value    Urine Culture, Routine   (*)     Value: GRAM NEGATIVE PER  50,000 - 99,999 cfu/ml  Identification and susceptibility pending  No other significant isolate      All other components within normal limits    Narrative:     Specimen Source->Urine   URINALYSIS, REFLEX TO URINE CULTURE - Abnormal; Notable for the following components:    Occult Blood UA 1+ (*)     Nitrite, UA Positive (*)     Leukocytes, UA 3+ (*)     All other components within normal limits    Narrative:     Specimen Source->Urine   URINALYSIS MICROSCOPIC - Abnormal; Notable for the following components:    WBC, UA 49 (*)     Yeast, UA Occasional (*)     All other components within normal limits    Narrative:     Specimen Source->Urine     EKG Readings: (Independently Interpreted)   Initial Reading: No STEMI. Rhythm: Normal Sinus Rhythm. Heart Rate: 63. ST Segments: Normal ST Segments. T  Waves: Normal.   NSR: low voltage QRS;      ECG Results              EKG 12-lead (In process)  Result time 12/20/23 10:46:18      In process by Interface, Lab In [facility] (12/20/23 10:46:18)                   Narrative:    Test Reason : M79.89,    Vent. Rate : 063 BPM     Atrial Rate : 063 BPM     P-R Int : 182 ms          QRS Dur : 112 ms      QT Int : 446 ms       P-R-T Axes : 051 009 022 degrees     QTc Int : 456 ms    Normal sinus rhythm  Low voltage QRS  Cannot rule out Anterior infarct (cited on or before 30-SEP-2023)  Abnormal ECG  When compared with ECG of 02-NOV-2023 10:49,  QT has shortened    Referred By: [email or web address]   SELF           Confirmed By:                       In process by Interface, Lab In [facility] (12/20/23 10:01:11)                   Narrative:    Test Reason : M79.89,    Vent. Rate : 063 BPM     Atrial Rate : 063 BPM     P-R Int : 182 ms          QRS Dur : 112 ms      QT Int : 446 ms       P-R-T Axes : 051 009 022 degrees     QTc Int : 456 ms    Normal sinus rhythm  Low voltage QRS  Cannot rule out Anterior infarct (cited on or before 30-SEP-2023)  Abnormal ECG  When compared with ECG of 02-NOV-2023 10:49,  QT has shortened    Referred By: [email or web address]   SELF           Confirmed By:                       In process by Interface, Lab In [facility] (12/20/23 09:22:46)                   Narrative:    Test Reason : M79.89,    Vent. Rate : 063 BPM     Atrial Rate : 063 BPM     P-R Int : 182 ms          QRS Dur : 112 ms      QT Int : 446 ms       P-R-T Axes : 051 009 022 degrees     QTc Int : 456 ms    Normal sinus rhythm  Low voltage QRS  Cannot rule out Anterior infarct (cited on or before 30-SEP-2023)  Abnormal ECG  When compared with ECG of 02-NOV-2023 10:49,  QT has shortened    Referred By: [email or web address]   SELF           Confirmed By:                                   Imaging Results              X-Ray Foot AP Bilateral (Final result)  Result time 12/20/23 00:06:32      Final result by Josh Hernadez  MD [facility] (12/20/23 00:06:32)                   Impression:      Degenerative changes throughout the right foot with displaced subacute to chronic fractures through the distal necks of the 3rd 4th and 5th metatarsals.      Electronically signed by: Josh Hernadez  Date:    12/20/2023  Time:    00:06               Narrative:    EXAMINATION:  XR FOOT AP BILAT    CLINICAL HISTORY:  bilateral foot pain;    TECHNIQUE:  PA views of the right and left feet.    COMPARISON:  Right foot 3 views, 09/09/2023    FINDINGS:  Subacute fractures of the distal 3rd 4th and 5th metatarsals.  Osteoarthritis throughout the visualized feet.  Hammertoe deformities of the right 4 toes.                                       X-Ray Chest AP Portable (Final result)  Result time 12/19/23 23:58:33      Final result by Josh Hernadez MD (12/19/23 23:58:33)                   Impression:      New infiltrate and effusion in the left lung base.  Correlate for left lower lobe pneumonia.  Follow-up until clearing advised.      Electronically signed by: Josh Hernadez  Date:    12/19/2023  Time:    23:58               Narrative:    EXAMINATION:  XR CHEST AP PORTABLE    CLINICAL HISTORY:  Other specified soft tissue disorders    TECHNIQUE:  Single frontal view of the chest was performed.    COMPARISON:  The 11/02/2023    FINDINGS:  Cardiac silhouette appears stable.  Double density behind the heart suggests hiatal hernia.  Spinal cord stimulator, ACDF plate and mandibular dental implants are again noted.  Infiltrate and effusion in the left lung base with the remainder of the lungs clear.                                       Medications - No data to display  Medical Decision Making  Amount and/or Complexity of Data Reviewed  Labs: ordered. Decision-making details documented in ED Course.  Radiology: ordered. Decision-making details documented in ED Course.    Risk  Prescription drug management.               ED Course as of 12/21/23 1151   Wed Dec  20, 2023   0040 X-Ray Foot AP Bilateral [LC]   0040 X-Ray Foot AP Bilateral  Degenerative changes throughout the right foot with displaced subacute to chronic fractures through the distal necks of the 3rd 4th and 5th metatarsals.         [LC]   0040 New infiltrate and effusion in the left lung base.  Correlate for left lower lobe pneumonia.  Follow-up until clearing advised.      [LC]   0046 Chest x-ray concerning for left lower lobe pneumonia.  The patient vital signs are within normal limits.  Cough fever or chills. [LC]   0133 Leukocytes, UA(!): 3+ [LC]   0133 NITRITE UA(!): Positive [LC]   Thu Dec 21, 2023   1145 NITRITE UA(!): Positive [LC]      ED Course User Index  [LC] Tushar Muniz MD                 Medical Decision Making:   Initial Assessment:   See HPI  Will obtain xrays, UA   Differential Diagnosis:   CHF exacerbation, exacerbation of chronic pain, chronic lymphedema, UTI   Clinical Tests:   Lab Tests: Ordered and Reviewed  Radiological Study: Ordered and Reviewed  ED Management:  - patient presents with pain of the bilateral feet which is chronic in nature for her; her chronic lymphedema is also stable at her baseline; the patient does report findings concerning for dysuria; she does have a suprapubic cath and her urinalysis is consistent with nitrite positive urine; plain radiographs of the chest demonstrate no acute cardiopulmonary process, overt fluid load requiring diuresis or further emergent intervention at this time; her x-rays of her bilateral feet show chronic changes and she is currently being followed by Podiatry; I will discharge home with prescriptions for antibiotics but she is on chronic pain medications specifically she has on a Dilaudid pump for lower back pain and has p.o. narcotics for her chronic foot pain; I do not feel the patient merits further investigation with additional laboratory analysis, admission at this time; she appears to be stable and is in no acute distress;  I will refer to a different podiatrist as they expressed concerns with her current podiatrist; both the  and patient are comfortable with the plan for discharge in outpatient treatment and follow-up as indicated above; patient was discharged home per request in stable condition  - No further intervention is indicated at this time after having taken into account the patient's history, physical exam findings, and empirical and objective data obtained during the patient's emergency department workup.   - The patient is at low risk for an emergent medical condition at this time, and I am of the belief that that it is safe to discharge the patient from the emergency department.   - The patient is instructed to follow up as outpatient as indicated on the discharge paperwork.    - I have discussed the specifics of the workup with the patient and the patient has verbalized understanding of the details of the workup, the diagnosis, the treatment plan, and the need for outpatient follow-up.    - Although the patient has no emergent etiology today this does not preclude the development of an emergent condition so, in addition, I have advised the patient that they can return to the ED and/or activate EMS at any time with worsening of their symptoms, change of their symptoms, or with any other medical complaint.    - The patient remained comfortable and stable during their visit in the ED.    - Discharge and follow-up instructions discussed with the patient who expressed understanding and willingness to comply with my recommendations.  - Results of all emergency department tests  discussed thoroughly with patient; all patient questions answered; pt in agreement with plan  - Pt instructed to follow up with PCP in 2-3 days for recheck of today's complaints  - Pt given strict emergency department return precautions for any new or worsening of symptoms  - Pt discharged from the emergency department in stable condition, in no  acute distress                 Clinical Impression:  Final diagnoses:  [M79.89] Leg swelling  [I89.0] Lymphedema  [J18.1] Left lower lobe consolidation (Primary)  [N39.0] Urinary tract infection without hematuria, site unspecified  [M77.32] Calcaneal spur of left foot  [F11.90] Chronic, continuous use of opioids  [Z86.19] History of staph infection  [R52] Intractable pain  [M14.679] Charcot ankle, unspecified laterality          ED Disposition Condition    Discharge Stable          ED Prescriptions       Medication Sig Dispense Start Date End Date Auth. Provider    levoFLOXacin (LEVAQUIN) 500 MG tablet Take 1 tablet (500 mg total) by mouth once daily. for 7 days 7 tablet 12/20/2023 12/27/2023 Tushar Muniz MD          Follow-up Information       Follow up With Specialties Details Why Contact Info    Mesfin Hodges II, MD Internal Medicine Schedule an appointment as soon as possible for a visit   [address]  [place] [address]  [phone]               Tushar Muniz MD  12/21/23 1207

## 2023-12-20 NOTE — TELEPHONE ENCOUNTER
----- Message from Eris Astorga sent at 12/20/2023 12:50 PM CST -----  Contact: Physical Therapist Jailene [phone]  Consult    She is having issues with compliance and is wondering if the patient needs to be back with home health.    Thank you

## 2023-12-21 ENCOUNTER — TELEPHONE (OUTPATIENT)
Dept: REHABILITATION | Facility: HOSPITAL | Age: 68
End: 2023-12-21
Payer: MEDICARE

## 2023-12-21 ENCOUNTER — PATIENT MESSAGE (OUTPATIENT)
Dept: REHABILITATION | Facility: HOSPITAL | Age: 68
End: 2023-12-21
Payer: MEDICARE

## 2023-12-21 NOTE — TELEPHONE ENCOUNTER
12/20/2023    Pt called in regards to pt's visit today. Pt went to ER yesterday and has possible pneumonia. Will need to cancel and she will see PCP later this week. No answer, message left     Jailene Zaman, PT, DPT, CLT

## 2023-12-21 NOTE — TELEPHONE ENCOUNTER
12/21/2023   4:39pm     Called pt to check on status and offer possible visits for next week. Call appears to be dropped.     Jailene Zaman, PT, DPT, CLT

## 2023-12-23 LAB — BACTERIA UR CULT: ABNORMAL

## 2023-12-26 ENCOUNTER — TELEPHONE (OUTPATIENT)
Dept: INTERNAL MEDICINE | Facility: CLINIC | Age: 68
End: 2023-12-26
Payer: MEDICARE

## 2023-12-26 NOTE — TELEPHONE ENCOUNTER
----- Message from Alecia Villanueva sent at 12/22/2023 11:38 AM CST -----  Contact: Tasha [phone]  Would like to receive medical advice.    Would they like a call back or a response via [facility]:  call back    Additional information:  Tasha is calling to speak to the provider or staff on behalf of getting some scans orders put in. Tasha states she needs 2 different types of orders a full body & a joint scan as well. Please call Tasha back for advice.

## 2023-12-27 ENCOUNTER — OUTPATIENT CASE MANAGEMENT (OUTPATIENT)
Dept: ADMINISTRATIVE | Facility: OTHER | Age: 68
End: 2023-12-27
Payer: MEDICARE

## 2023-12-27 DIAGNOSIS — M25.50 ARTHRALGIA, UNSPECIFIED JOINT: Primary | ICD-10-CM

## 2023-12-27 RX ORDER — DARIFENACIN 7.5 MG/1
7.5 TABLET, EXTENDED RELEASE ORAL
Qty: 30 TABLET | Refills: 11 | Status: SHIPPED | OUTPATIENT
Start: 2023-12-27

## 2023-12-27 NOTE — LETTER
Tasha Hawley  [address] [place] [address]      Dear Tasha Hawley,     I am your nurse with [facility]s Out[facility] Department. I have been unsuccessful at reaching you since we spoke on 12/8.  At your earliest convenience, I would like to discuss your healthcare progress.      Please contact me at [phone] from 8:00AM to 4:30 PM on Monday thru Friday.     As you know, [facility] is a program offered to you through [facility] where a nurse is available 24/7 to answer questions or provide medical advice, their number is [phone].    Thanks,    Kenya Box RN  Outpatient Care Management

## 2023-12-28 ENCOUNTER — HOSPITAL ENCOUNTER (OUTPATIENT)
Dept: RADIOLOGY | Facility: HOSPITAL | Age: 68
Discharge: HOME | End: 2023-12-28
Attending: STUDENT IN AN ORGANIZED HEALTH CARE EDUCATION/TRAINING PROGRAM
Payer: MEDICARE

## 2023-12-28 ENCOUNTER — OFFICE VISIT (OUTPATIENT)
Dept: PODIATRY | Facility: CLINIC | Age: 68
End: 2023-12-28
Payer: MEDICARE

## 2023-12-28 ENCOUNTER — TELEPHONE (OUTPATIENT)
Dept: UROLOGY | Facility: CLINIC | Age: 68
End: 2023-12-28
Payer: MEDICARE

## 2023-12-28 VITALS
SYSTOLIC BLOOD PRESSURE: 100 MMHG | TEMPERATURE: 98 F | DIASTOLIC BLOOD PRESSURE: 66 MMHG | HEIGHT: 64 IN | RESPIRATION RATE: 16 BRPM | HEART RATE: 64 BPM | BODY MASS INDEX: 33.12 KG/M2 | WEIGHT: 194 LBS

## 2023-12-28 DIAGNOSIS — M79.672 CHRONIC PAIN OF BOTH FEET: ICD-10-CM

## 2023-12-28 DIAGNOSIS — M79.671 CHRONIC PAIN OF BOTH FEET: ICD-10-CM

## 2023-12-28 DIAGNOSIS — R23.8 BLISTERS OF MULTIPLE SITES: ICD-10-CM

## 2023-12-28 DIAGNOSIS — G89.29 CHRONIC PAIN OF BOTH FEET: ICD-10-CM

## 2023-12-28 DIAGNOSIS — M79.661 PAIN IN BOTH LOWER LEGS: ICD-10-CM

## 2023-12-28 DIAGNOSIS — L03.119 CELLULITIS OF LOWER EXTREMITY, UNSPECIFIED LATERALITY: ICD-10-CM

## 2023-12-28 DIAGNOSIS — I89.0 LYMPHEDEMA OF BOTH LOWER EXTREMITIES: ICD-10-CM

## 2023-12-28 DIAGNOSIS — M79.662 PAIN IN BOTH LOWER LEGS: ICD-10-CM

## 2023-12-28 DIAGNOSIS — S92.901G CLOSED MULTIPLE FRACTURES OF RIGHT FOOT WITH DELAYED HEALING, SUBSEQUENT ENCOUNTER: ICD-10-CM

## 2023-12-28 DIAGNOSIS — R52 INTRACTABLE PAIN: Primary | ICD-10-CM

## 2023-12-28 PROCEDURE — 73630 X-RAY EXAM OF FOOT: CPT | Mod: 26,50,, | Performed by: RADIOLOGY

## 2023-12-28 PROCEDURE — 73630 XR FOOT COMPLETE 3 VIEW BILATERAL: ICD-10-PCS | Mod: 26,50,, | Performed by: RADIOLOGY

## 2023-12-28 PROCEDURE — 3288F FALL RISK ASSESSMENT DOCD: CPT | Mod: CPTII,S$GLB,, | Performed by: STUDENT IN AN ORGANIZED HEALTH CARE EDUCATION/TRAINING PROGRAM

## 2023-12-28 PROCEDURE — 73610 X-RAY EXAM OF ANKLE: CPT | Mod: TC,50

## 2023-12-28 PROCEDURE — 1100F PTFALLS ASSESS-DOCD GE2>/YR: CPT | Mod: CPTII,S$GLB,, | Performed by: STUDENT IN AN ORGANIZED HEALTH CARE EDUCATION/TRAINING PROGRAM

## 2023-12-28 PROCEDURE — 99214 PR OFFICE/OUTPT VISIT, EST, LEVL IV, 30-39 MIN: ICD-10-PCS | Mod: 25,S$GLB,, | Performed by: STUDENT IN AN ORGANIZED HEALTH CARE EDUCATION/TRAINING PROGRAM

## 2023-12-28 PROCEDURE — 3288F PR FALLS RISK ASSESSMENT DOCUMENTED: ICD-10-PCS | Mod: CPTII,S$GLB,, | Performed by: STUDENT IN AN ORGANIZED HEALTH CARE EDUCATION/TRAINING PROGRAM

## 2023-12-28 PROCEDURE — 1125F AMNT PAIN NOTED PAIN PRSNT: CPT | Mod: CPTII,S$GLB,, | Performed by: STUDENT IN AN ORGANIZED HEALTH CARE EDUCATION/TRAINING PROGRAM

## 2023-12-28 PROCEDURE — 3078F PR MOST RECENT DIASTOLIC BLOOD PRESSURE < 80 MM HG: ICD-10-PCS | Mod: CPTII,S$GLB,, | Performed by: STUDENT IN AN ORGANIZED HEALTH CARE EDUCATION/TRAINING PROGRAM

## 2023-12-28 PROCEDURE — 73610 XR ANKLE COMPLETE 3 VIEW BILATERAL: ICD-10-PCS | Mod: 26,50,, | Performed by: RADIOLOGY

## 2023-12-28 PROCEDURE — 3008F PR BODY MASS INDEX (BMI) DOCUMENTED: ICD-10-PCS | Mod: CPTII,S$GLB,, | Performed by: STUDENT IN AN ORGANIZED HEALTH CARE EDUCATION/TRAINING PROGRAM

## 2023-12-28 PROCEDURE — 3044F PR MOST RECENT HEMOGLOBIN A1C LEVEL <7.0%: ICD-10-PCS | Mod: CPTII,S$GLB,, | Performed by: STUDENT IN AN ORGANIZED HEALTH CARE EDUCATION/TRAINING PROGRAM

## 2023-12-28 PROCEDURE — 73610 X-RAY EXAM OF ANKLE: CPT | Mod: 26,50,, | Performed by: RADIOLOGY

## 2023-12-28 PROCEDURE — 29580 PR STRAPPING UNNA BOOT: ICD-10-PCS | Mod: 50,S$GLB,, | Performed by: STUDENT IN AN ORGANIZED HEALTH CARE EDUCATION/TRAINING PROGRAM

## 2023-12-28 PROCEDURE — 1159F PR MEDICATION LIST DOCUMENTED IN MEDICAL RECORD: ICD-10-PCS | Mod: CPTII,S$GLB,, | Performed by: STUDENT IN AN ORGANIZED HEALTH CARE EDUCATION/TRAINING PROGRAM

## 2023-12-28 PROCEDURE — 29580 STRAPPING UNNA BOOT: CPT | Mod: 50,S$GLB,, | Performed by: STUDENT IN AN ORGANIZED HEALTH CARE EDUCATION/TRAINING PROGRAM

## 2023-12-28 PROCEDURE — 1100F PR PT FALLS ASSESS DOC 2+ FALLS/FALL W/INJURY/YR: ICD-10-PCS | Mod: CPTII,S$GLB,, | Performed by: STUDENT IN AN ORGANIZED HEALTH CARE EDUCATION/TRAINING PROGRAM

## 2023-12-28 PROCEDURE — 3078F DIAST BP <80 MM HG: CPT | Mod: CPTII,S$GLB,, | Performed by: STUDENT IN AN ORGANIZED HEALTH CARE EDUCATION/TRAINING PROGRAM

## 2023-12-28 PROCEDURE — 3074F PR MOST RECENT SYSTOLIC BLOOD PRESSURE < 130 MM HG: ICD-10-PCS | Mod: CPTII,S$GLB,, | Performed by: STUDENT IN AN ORGANIZED HEALTH CARE EDUCATION/TRAINING PROGRAM

## 2023-12-28 PROCEDURE — 99999 PR PBB SHADOW E&M-EST. PATIENT-LVL V: ICD-10-PCS | Mod: PBBFAC,,, | Performed by: STUDENT IN AN ORGANIZED HEALTH CARE EDUCATION/TRAINING PROGRAM

## 2023-12-28 PROCEDURE — 99999 PR PBB SHADOW E&M-EST. PATIENT-LVL V: CPT | Mod: PBBFAC,,, | Performed by: STUDENT IN AN ORGANIZED HEALTH CARE EDUCATION/TRAINING PROGRAM

## 2023-12-28 PROCEDURE — 3044F HG A1C LEVEL LT 7.0%: CPT | Mod: CPTII,S$GLB,, | Performed by: STUDENT IN AN ORGANIZED HEALTH CARE EDUCATION/TRAINING PROGRAM

## 2023-12-28 PROCEDURE — 3008F BODY MASS INDEX DOCD: CPT | Mod: CPTII,S$GLB,, | Performed by: STUDENT IN AN ORGANIZED HEALTH CARE EDUCATION/TRAINING PROGRAM

## 2023-12-28 PROCEDURE — 1125F PR PAIN SEVERITY QUANTIFIED, PAIN PRESENT: ICD-10-PCS | Mod: CPTII,S$GLB,, | Performed by: STUDENT IN AN ORGANIZED HEALTH CARE EDUCATION/TRAINING PROGRAM

## 2023-12-28 PROCEDURE — 99214 OFFICE O/P EST MOD 30 MIN: CPT | Mod: 25,S$GLB,, | Performed by: STUDENT IN AN ORGANIZED HEALTH CARE EDUCATION/TRAINING PROGRAM

## 2023-12-28 PROCEDURE — 1159F MED LIST DOCD IN RCRD: CPT | Mod: CPTII,S$GLB,, | Performed by: STUDENT IN AN ORGANIZED HEALTH CARE EDUCATION/TRAINING PROGRAM

## 2023-12-28 PROCEDURE — 3074F SYST BP LT 130 MM HG: CPT | Mod: CPTII,S$GLB,, | Performed by: STUDENT IN AN ORGANIZED HEALTH CARE EDUCATION/TRAINING PROGRAM

## 2023-12-28 PROCEDURE — 73630 X-RAY EXAM OF FOOT: CPT | Mod: TC,50

## 2023-12-28 RX ORDER — DOXYCYCLINE 100 MG/1
100 CAPSULE ORAL 2 TIMES DAILY
Qty: 14 CAPSULE | Refills: 0 | Status: SHIPPED | OUTPATIENT
Start: 2023-12-28 | End: 2024-01-05

## 2023-12-28 RX ORDER — DULOXETIN HYDROCHLORIDE 60 MG/1
60 CAPSULE, DELAYED RELEASE ORAL DAILY
Qty: 30 CAPSULE | Refills: 11 | Status: SHIPPED | OUTPATIENT
Start: 2023-12-28 | End: 2024-01-17

## 2023-12-28 RX ORDER — HYDROCODONE BITARTRATE AND ACETAMINOPHEN 10; 325 MG/1; MG/1
1 TABLET ORAL EVERY 6 HOURS PRN
Qty: 30 TABLET | Refills: 0 | Status: SHIPPED | OUTPATIENT
Start: 2023-12-28 | End: 2024-01-17 | Stop reason: SDUPTHER

## 2023-12-28 NOTE — PROGRESS NOTES
Subjective:     Patient    Tasha Hawley is a 68 y.o. female.    Problem    09/21/23: Previously followed outpatient by Dr Mcguire. 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 Has ongoing right 1st toe pain, poorly controlled on opioids. Uses surgical shoes. Also has home health wrapping her legs every other day for lymphedema, does not have a physician following her for lymphedema.     10/02/23: Stopped amitriptyline due to side effects. Requesting refill on tramadol. Has followup with pain management in 2 weeks.     12/28/23: Returns for severe bilateral lower leg and foot pain, has been having falls lately and cannot walk. Now starting lymphedema therapy and so the insurance will not also cover HH dressing changes.     History    History obtained from patient and review of medical records.     Past Medical History:   Diagnosis Date    Anxiety     Arthritis     Bilateral lower extremity edema     severe chronic    Carotid artery occlusion     Cataract     CHF (congestive heart failure)     Coronary artery disease     subtotalled LAD with collateral    Depression     Fever blister     Hard of hearing     Hypokalemia 01/09/2023    Hyponatremia 02/04/2022    Hypothyroid     Iron deficiency anemia     Lumbar radiculopathy     with chronic pain    Ocular migraine     Other emphysema 05/22/2023    Renal disorder     Sleep apnea     cpap       Past Surgical History:   Procedure Laterality Date    ADENOIDECTOMY      BACK SURGERY      x 12    CARDIAC CATHETERIZATION  2016    subtotalled LAD with right to left collaterals    CATARACT EXTRACTION W/  INTRAOCULAR LENS IMPLANT Left     Dr Coleman     CYSTOSCOPIC LITHOLAPAXY N/A 6/27/2019    Procedure: CYSTOLITHOLAPAXY;  Surgeon: Shireen Mayo MD;  Location: [facility] OR [address] [place] [address];  Service: Urology;  Laterality: N/A;    CYSTOSCOPIC LITHOLAPAXY N/A 9/3/2019    Procedure:  CYSTOLITHOLAPAXY;  Surgeon: Shireen Mayo MD;  Location: [facility] OR 2ND FLR;  Service: Urology;  Laterality: N/A;    CYSTOSCOPY N/A 7/13/2021    Procedure: CYSTOSCOPY;  Surgeon: Shireen Mayo MD;  Location: [facility] OR 1ST FLR;  Service: Urology;  Laterality: N/A;    CYSTOSCOPY  11/16/2021    Procedure: CYSTOSCOPY;  Surgeon: Shireen Mayo MD;  Location: [facility] OR 1ST FLR;  Service: Urology;;    CYSTOSCOPY  7/19/2022    Procedure: CYSTOSCOPY;  Surgeon: Shireen Mayo MD;  Location: [facility] OR 1ST FLR;  Service: Urology;;    CYSTOSCOPY WITH INJECTION OF PERIURETHRAL BULKING AGENT  7/19/2022    Procedure: CYSTOSCOPY, WITH PERIURETHRAL BULKING AGENT INJECTION-MACROPLASTIQUE;  Surgeon: Shireen Mayo MD;  Location: [facility] OR 1ST FLR;  Service: Urology;;    CYSTOSCOPY WITH INJECTION OF PERIURETHRAL BULKING AGENT N/A 3/28/2023    Procedure: CYSTOSCOPY, WITH PERIURETHRAL BULKING AGENT INJECTION;  Surgeon: Shireen Mayo MD;  Location: [facility] OR 1ST FLR;  Service: Urology;  Laterality: N/A;  [place]    CYSTOSCOPY WITH INJECTION OF PERIURETHRAL BULKING AGENT N/A 11/14/2023    Procedure: CYSTOSCOPY, WITH PERIURETHRAL BULKING AGENT INJECTION;  Surgeon: Shireen Mayo MD;  Location: [facility] OR 1ST FLR;  Service: Urology;  Laterality: N/A;    CYSTOSCOPY,WITH BOTULINUM TOXIN INJECTION N/A 12/13/2022    Procedure: CYSTOSCOPY,WITH BOTULINUM TOXIN INJECTION;  Surgeon: Shireen Mayo MD;  Location: [facility] OR 1ST FLR;  Service: Urology;  Laterality: N/A;  300 U    CYSTOSCOPY,WITH BOTULINUM TOXIN INJECTION N/A 3/28/2023    Procedure: CYSTOSCOPY,WITH BOTULINUM TOXIN INJECTION;  Surgeon: Shireen Mayo MD;  Location: [facility] OR 1ST FLR;  Service: Urology;  Laterality: N/A;  45 MIN.    300 UNITS    ESOPHAGOGASTRODUODENOSCOPY N/A 5/23/2018    Procedure: ESOPHAGOGASTRODUODENOSCOPY (EGD);  Surgeon: Prince Vance MD;  Location: [facility] ([address] [place] [address]);  Service: Endoscopy;  Laterality: N/A;  r/s 'd per Dr. Vance due to family  emergency- ER    ESOPHAGOGASTRODUODENOSCOPY N/A 6/23/2023    Procedure: EGD (ESOPHAGOGASTRODUODENOSCOPY);  Surgeon: Juaquin Barry MD;  Location: [facility] (2ND FLR);  Service: Endoscopy;  Laterality: N/A;  Refferal: ROSEANNE TMZ  Order  tele enc 5/18/23  dx: history of a Nissen fundoplication  Additional Scheduling Information:  On home oxygen 2nd floor for airway protection also with a pain pump elevated BMI close to 40   Prep Gastroparesis   ins    HYSTERECTOMY  1975    endometriosis    INJECTION OF BOTULINUM TOXIN TYPE A  7/13/2021    Procedure: INJECTION, BOTULINUM TOXIN, 200units;  Surgeon: Shireen Mayo MD;  [facility];  Service: Urology;;    INJECTION OF BOTULINUM TOXIN TYPE A  11/16/2021    Procedure: INJECTION, BOTULINUM TOXIN, 200units;  Surgeon: Shireen Mayo MD;  [facility];  Service: Urology;;    INJECTION OF BOTULINUM TOXIN TYPE A  7/19/2022    Procedure: INJECTION, BOTULINUM TOXIN, 300 units ;  Surgeon: Shireen Mayo MD;  [facility];  Service: Urology;;    INJECTION OF BOTULINUM TOXIN TYPE A N/A 11/14/2023    Procedure: INJECTION, BOTULINUM TOXIN, TYPE A;  Surgeon: Shireen Mayo MD;  [facility];  Service: Urology;  Laterality: N/A;    INSERTION, SUPRAPUBIC CATHETER N/A 12/13/2022    Procedure: INSERTION, SUPRAPUBIC CATHETER;  Surgeon: Shireen Mayo MD;  [facility];  Service: Urology;  Laterality: N/A;  exchange    INSERTION, SUPRAPUBIC CATHETER N/A 11/14/2023    Procedure: INSERTION, SUPRAPUBIC CATHETER;  Surgeon: Shireen Mayo MD;  [facility];  Service: Urology;  Laterality: N/A;  EXCHANGE of s/p tube    pain pump placement      SQ Dilaudid Pump managed by Dr. Hillman, Pain Management    REMOVAL OF BONE SPUR OF FOOT Bilateral 9/16/2022    Procedure: EXCISION ARTHRITIC BONE, BILATERAL FOOT;  Surgeon: Adam Mcguire DPM;  Location: [facility];  Service: Podiatry;   Laterality: Bilateral;    REPLACEMENT OF BACLOFEN PUMP N/A 2023    Procedure: REPLACEMENT, BACLOFEN PUMP;  Surgeon: Smitha Condon MD;  Location: [facility] OR 2ND FLR;  Service: Neurosurgery;  Laterality: N/A;  Anes: Gen  Blood: Type & Screen  Pos: Left Lat  Intrathecal Pump  Bed: Reg Reversed  Rad: C-arm  Pump: 40 cc, [employer]tron[employer]    REPLACEMENT OF CATHETER N/A 10/31/2019    Procedure: REPLACEMENT, CATHETER-SUPRAPUBIC;  Surgeon: Shireen Mayo MD;  Location: [facility] OR 1ST FLR;  Service: Urology;  Laterality: N/A;    SPINAL CORD STIMULATOR REMOVAL      before Larissa    SPINE SURGERY  13    CERVICAL FUSION    TONSILLECTOMY          Objective:     Vitals  Wt Readings from Last 1 Encounters:   23 88 kg (194 lb)     Temp Readings from Last 1 Encounters:   23 98.4 °F (36.9 °C)     BP Readings from Last 1 Encounters:   23 100/66     Pulse Readings from Last 1 Encounters:   23 64       Dermatological Exam    Skin:  Pedal hair growth diminished and Pedal skin thin and shiny on right; irritation and blistering of skin  Pedal hair growth diminished and Pedal skin thin and shiny on left; irritation and blistering of skin       Nails:  10 nail(s) thickened and 10 nail(s) discolored    Vascular Exam    Arteries:  Posterior tibial artery nonpalpable on right  Dorsalis pedis artery palpable on right  Posterior tibial artery nonpalpable on left  Dorsalis pedis artery palpable on left    Veins:  Telangectasia on right  Telangectasia on left    Swellin+ nonpitting on right  2+ nonpitting on left    Neurological Exam    Emporium touch test:  6/6 sites sensed, light touch intact     Musculoskeletal Exam    Footwear:  Surgical shoe on right  Surgical shoe on left    Gait Exam:   Ambulatory Status: Ambulatory  Gait: Normal  Assistive Devices: None    Foot Progression Angle:  Normal on right  Normal on left     Right Lower Extremity Additional Findings:  Diffuse severe pain from toes to knee  Right foot and  ankle function, strength, and range of motion unremarkable except as noted above.     Left Lower Extremity Additional Findings:  Diffuse severe pain from toes to knee  Left foot and ankle function, strength, and range of motion unremarkable except as noted above.    Imaging and Other Tests    Imaging:  Independently reviewed and interpreted imaging, findings are as follows:     12/28/23 B foot and ankle X rays: bilateral flatfoot deformity, osteopenia, right 1st toe and 2nd-5th distal metatarsal fractures     Assessment:     Encounter Diagnoses   Name Primary?    Intractable pain Yes    Chronic pain of both feet     Pain in both lower legs     Cellulitis of lower extremity, unspecified laterality     Closed multiple fractures of right foot with delayed healing, subsequent encounter           Plan:     I counseled the patient on her conditions, their implications and medical management.    Intractable pain, both lower legs and feet: chronic worsening  -Stop fluoxetine, start duloxetine, will titrate.   -Norco PRN.   -Previously did not tolerate pregabalin or amitriptyline, may consider gabapentin.     Right foot fractures: subacute   -X rays ordered, reviewed, interpreted as above.   -Appreciate that patient cannot safely remain NWB RLE.  -Continue protected WB RLE in surgical shoe for now.      Lymphedema, blistering, cellulitis: chronic stable  -Start doxycycline.     -Bilateral unna boot multilayer compression dressings applied to lower legs.   -Continue lymphedema therapy    Return to clinic in 1 week for dressing change, call sooner PRN.

## 2023-12-28 NOTE — TELEPHONE ENCOUNTER
----- Message from Ramiro Ford sent at 12/28/2023  8:15 AM CST -----  Regarding: Consult/Advise  Contact: @ [phone]  Pt is requesting to speak with Christi in regards to her leaking again. Pt is also wanting to know about her catheter and needing something for a yeast infection. Pt states that she got botox 2 weeks ago and it did not help. Please call pt to discuss further

## 2023-12-29 ENCOUNTER — TELEPHONE (OUTPATIENT)
Dept: UROLOGY | Facility: CLINIC | Age: 68
End: 2023-12-29
Payer: MEDICARE

## 2023-12-29 NOTE — TELEPHONE ENCOUNTER
----- Message from Francisco ALARCON Route sent at 12/29/2023  9:29 AM CST -----  Regarding: Yeast Infection  Contact: pt.  [phone]  Pt is calling to speak with Christi in ref to a yeast, kidney and bladder infection she says ASAP.  Patient Requesting Call Back @  [phone]      [facility] [place] - [place] - [address]  [address]  [place] [address]  Phone: [phone] Fax: [phone]

## 2023-12-29 NOTE — TELEPHONE ENCOUNTER
Left message that julissa called her yesterday and left this message:      Julissa Wang LPN   to Tasha Hawley      12/28/23  2:18 PM  lm notifying pt that she needs to come into clinic for an appt for assessment of her catheter since she is now past due for a change prior to being ablet to treat for yeast. Asked that she return call for scheduling w/ pt access. Pt cancelled appt on 12/27

## 2023-12-29 NOTE — TELEPHONE ENCOUNTER
----- Message from Francisco ALARCON Route sent at 12/29/2023  3:38 PM CST -----  Regarding: Speak With Christi  Contact: pt. [phone]  Pt is calling in ref to speaking to Christi about not being able to make it through the weekend without medication. Patient Requesting Call Back @ [phone]

## 2024-01-03 ENCOUNTER — HOSPITAL ENCOUNTER (EMERGENCY)
Facility: HOSPITAL | Age: 69
Discharge: HOME | End: 2024-01-04
Attending: EMERGENCY MEDICINE
Payer: MEDICARE

## 2024-01-03 DIAGNOSIS — M25.561 KNEE PAIN, BILATERAL: ICD-10-CM

## 2024-01-03 DIAGNOSIS — I89.0 LYMPHEDEMA: Primary | ICD-10-CM

## 2024-01-03 DIAGNOSIS — M25.562 KNEE PAIN, BILATERAL: ICD-10-CM

## 2024-01-03 DIAGNOSIS — M25.571 BILATERAL ANKLE PAIN: ICD-10-CM

## 2024-01-03 DIAGNOSIS — M25.572 BILATERAL ANKLE PAIN: ICD-10-CM
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
## 2024-01-03 PROCEDURE — 99284 EMERGENCY DEPT VISIT MOD MDM: CPT

## 2024-01-04 ENCOUNTER — TELEPHONE (OUTPATIENT)
Dept: INTERNAL MEDICINE | Facility: CLINIC | Age: 69
End: 2024-01-04
Payer: MEDICARE

## 2024-01-04 ENCOUNTER — PATIENT OUTREACH (OUTPATIENT)
Dept: EMERGENCY MEDICINE | Facility: HOSPITAL | Age: 69
End: 2024-01-04

## 2024-01-04 VITALS
HEART RATE: 69 BPM | DIASTOLIC BLOOD PRESSURE: 67 MMHG | SYSTOLIC BLOOD PRESSURE: 131 MMHG | WEIGHT: 230 LBS | BODY MASS INDEX: 36.1 KG/M2 | OXYGEN SATURATION: 100 % | TEMPERATURE: 98 F | RESPIRATION RATE: 20 BRPM | HEIGHT: 67 IN

## 2024-01-04 DIAGNOSIS — G89.4 CHRONIC PAIN SYNDROME: ICD-10-CM

## 2024-01-04 DIAGNOSIS — J96.11 CHRONIC RESPIRATORY FAILURE WITH HYPOXIA AND HYPERCAPNIA: ICD-10-CM

## 2024-01-04 DIAGNOSIS — I89.0 LYMPHEDEMA OF BOTH LOWER EXTREMITIES: Primary | ICD-10-CM

## 2024-01-04 DIAGNOSIS — J96.12 CHRONIC RESPIRATORY FAILURE WITH HYPOXIA AND HYPERCAPNIA: ICD-10-CM

## 2024-01-04 NOTE — ED NOTES
MD at bedside for reassessment and to discuss test results and discharge instructions, pending Podiatry appointment and wound care today. Pt. Dos not want dressings replaced to legs at this time.

## 2024-01-04 NOTE — TELEPHONE ENCOUNTER
----- Message from Michelle Box RN sent at 1/4/2024  3:01 PM CST -----  Regarding: [facility]  Good afternoon,    I am writing to find out how you feel about [facility] going to see the patient?  I want to mention it to her when I call but wanted to know your feelings on the matter before I bring it up to her.  Please advise.    Thank you,    Kenya Box RN  RN Case Manager  Outpatient Care Management  [facility]  [phone]  [email or web address]

## 2024-01-04 NOTE — ED NOTES
Pt. C/o (R) knee pain after a twist and near fall. She is on home oxygen and ambulates with a walker. She has chronic significant swelling of her bilateral legs.

## 2024-01-04 NOTE — ED PROVIDER NOTES
"Emergency Department Encounter  Provider Note    Tasha Hawley  [id number]  1/3/2024    Evaluation:    History Acquisition:     Chief Complaint   Patient presents with    Leg Pain     Hx of chronic leg pain r/t lymphedema. Reports left leg "gave out" while at [employer]. Pt fell onto cement ground. Pt very lethargic. Slurring words. Redness noted to eyes. Pt falling asleep during triage. Pts spouse reports lethargy r/t medication pt takes for lymphedema.       History of Present Illness:  Tasha Hawley is a 68 y.o. female who has a past medical history of Anxiety, Arthritis, Bilateral lower extremity edema, Carotid artery occlusion, Cataract, CHF (congestive heart failure), Coronary artery disease, Depression, Fever blister, Hard of hearing, Hypokalemia (01/09/2023), Hyponatremia (02/04/2022), Hypothyroid, Iron deficiency anemia, Lumbar radiculopathy, Ocular migraine, Other emphysema (05/22/2023), Renal disorder, and Sleep apnea.    The patient presents to the ED due to bilateral leg pain.   Patient reports symptoms started earlier today. While shopping at [employer], she states her L leg gave out and she fell to the ground. Afterward, she states both legs started hurting all over.  She reports a history of chronic bilateral lymphedema. She is followed by Podiatry. She was seen last wee and started on ABX for cellulitis. She denies any fever, CP, SOB. She uses home O2 at all times.     Additional historians utilized:  none    Prior medical records were reviewed:   Podiatry visit 12/28 for f/u chronic lymphedema and frequent falls. Started on doxycycline for cellulitis.   ED visit 12/19 for leg swelling, fall. Diagnosed with UTI.   Pain management visit 12/14 for chronic back pain.     The patient's list of active medical problems, social history, medications, and allergies as documented has been reviewed.     Past Medical History:   Diagnosis Date    Anxiety     Arthritis     Bilateral lower extremity edema     " severe chronic    Carotid artery occlusion     Cataract     CHF (congestive heart failure)     Coronary artery disease     subtotalled LAD with collateral    Depression     Fever blister     Hard of hearing     Hypokalemia 01/09/2023    Hyponatremia 02/04/2022    Hypothyroid     Iron deficiency anemia     Lumbar radiculopathy     with chronic pain    Ocular migraine     Other emphysema 05/22/2023    Renal disorder     Sleep apnea     cpap     Past Surgical History:   Procedure Laterality Date    ADENOIDECTOMY      BACK SURGERY      x 12    CARDIAC CATHETERIZATION  2016    subtotalled LAD with right to left collaterals    CATARACT EXTRACTION W/  INTRAOCULAR LENS IMPLANT Left     Dr Coleman     CYSTOSCOPIC LITHOLAPAXY N/A 6/27/2019    Procedure: CYSTOLITHOLAPAXY;  Surgeon: Shireen Mayo MD;  Location: [facility] OR 2ND FLR;  Service: Urology;  Laterality: N/A;    CYSTOSCOPIC LITHOLAPAXY N/A 9/3/2019    Procedure: CYSTOLITHOLAPAXY;  Surgeon: Shireen Mayo MD;  Location: [facility] OR 2ND FLR;  Service: Urology;  Laterality: N/A;    CYSTOSCOPY N/A 7/13/2021    Procedure: CYSTOSCOPY;  Surgeon: Shireen Mayo MD;  Location: [facility] OR 1ST FLR;  Service: Urology;  Laterality: N/A;    CYSTOSCOPY  11/16/2021    Procedure: CYSTOSCOPY;  Surgeon: Shireen Mayo MD;  Location: [facility] OR 1ST FLR;  Service: Urology;;    CYSTOSCOPY  7/19/2022    Procedure: CYSTOSCOPY;  Surgeon: Shireen Mayo MD;  Location: [facility] OR 1ST FLR;  Service: Urology;;    CYSTOSCOPY WITH INJECTION OF PERIURETHRAL BULKING AGENT  7/19/2022    Procedure: CYSTOSCOPY, WITH PERIURETHRAL BULKING AGENT INJECTION-MACROPLASTIQUE;  Surgeon: Shireen Mayo MD;  Location: [facility] OR 1ST FLR;  Service: Urology;;    CYSTOSCOPY WITH INJECTION OF PERIURETHRAL BULKING AGENT N/A 3/28/2023    Procedure: CYSTOSCOPY, WITH PERIURETHRAL BULKING AGENT INJECTION;  Surgeon: Shireen Mayo MD;  Location: [facility] OR 1ST FLR;  Service: Urology;  Laterality: N/A;  [place]    CYSTOSCOPY  WITH INJECTION OF PERIURETHRAL BULKING AGENT N/A 11/14/2023    Procedure: CYSTOSCOPY, WITH PERIURETHRAL BULKING AGENT INJECTION;  Surgeon: Shireen Mayo MD;  Location: [facility] OR 1ST FLR;  Service: Urology;  Laterality: N/A;    CYSTOSCOPY,WITH BOTULINUM TOXIN INJECTION N/A 12/13/2022    Procedure: CYSTOSCOPY,WITH BOTULINUM TOXIN INJECTION;  Surgeon: Shireen Mayo MD;  [facility];  Service: Urology;  Laterality: N/A;  300 U    CYSTOSCOPY,WITH BOTULINUM TOXIN INJECTION N/A 3/28/2023    Procedure: CYSTOSCOPY,WITH BOTULINUM TOXIN INJECTION;  Surgeon: Shireen Mayo MD;  Location: [facility] OR 1ST FLR;  Service: Urology;  Laterality: N/A;  45 MIN.    300 UNITS    ESOPHAGOGASTRODUODENOSCOPY N/A 5/23/2018    Procedure: ESOPHAGOGASTRODUODENOSCOPY (EGD);  Surgeon: Prince Vance MD;  Location: [facility] ENDO (4TH FLR);  Service: Endoscopy;  Laterality: N/A;  r/s 'd per Dr. Vance due to family emergency- ER    ESOPHAGOGASTRODUODENOSCOPY N/A 6/23/2023    Procedure: EGD (ESOPHAGOGASTRODUODENOSCOPY);  Surgeon: Juaquin Barry MD;  Location: [facility] (2ND FLR);  Service: Endoscopy;  Laterality: N/A;  Refferal: PRINCE VANCE  Order  tele enc 5/18/23  dx: history of a Nissen fundoplication  Additional Scheduling Information:  On home oxygen 2nd floor for airway protection also with a pain pump elevated BMI close to 40   Prep Gastroparesis   ins    HYSTERECTOMY  1975    endometriosis    INJECTION OF BOTULINUM TOXIN TYPE A  7/13/2021    Procedure: INJECTION, BOTULINUM TOXIN, 200units;  Surgeon: Shireen Mayo MD;  [facility];  Service: Urology;;    INJECTION OF BOTULINUM TOXIN TYPE A  11/16/2021    Procedure: INJECTION, BOTULINUM TOXIN, 200units;  Surgeon: Shireen Mayo MD;  [facility];  Service: Urology;;    INJECTION OF BOTULINUM TOXIN TYPE A  7/19/2022    Procedure: INJECTION, BOTULINUM TOXIN, 300 units ;  Surgeon: Shireen Mayo MD;  Location: [facility] OR [address] [place] [address];   Service: Urology;;    INJECTION OF BOTULINUM TOXIN TYPE A N/A 11/14/2023    Procedure: INJECTION, BOTULINUM TOXIN, TYPE A;  Surgeon: Shireen Mayo MD;  Location: [facility] OR [address] [place] [address];  Service: Urology;  Laterality: N/A;    INSERTION, SUPRAPUBIC CATHETER N/A 12/13/2022    Procedure: INSERTION, SUPRAPUBIC CATHETER;  Surgeon: Shireen Mayo MD;  [facility];  Service: Urology;  Laterality: N/A;  exchange    INSERTION, SUPRAPUBIC CATHETER N/A 11/14/2023    Procedure: INSERTION, SUPRAPUBIC CATHETER;  Surgeon: Shireen Mayo MD;  Location: [facility] OR [address] [place] [address];  Service: Urology;  Laterality: N/A;  EXCHANGE of s/p tube    pain pump placement      SQ Dilaudid Pump managed by Dr. Hillman, Pain Management    REMOVAL OF BONE SPUR OF FOOT Bilateral 9/16/2022    Procedure: EXCISION ARTHRITIC BONE, BILATERAL FOOT;  Surgeon: NICOLA Mcgrath;  Location: [facility];  Service: Podiatry;  Laterality: Bilateral;    REPLACEMENT OF BACLOFEN PUMP N/A 8/2/2023    Procedure: REPLACEMENT, BACLOFEN PUMP;  Surgeon: Smitha Condon MD;  Location: [facility] OR [address] [place] [address];  Service: Neurosurgery;  Laterality: N/A;  Anes: Gen  Blood: Type & Screen  Pos: Left Lat  Intrathecal Pump  Bed: Reg Reversed  Rad: C-arm  Pump: 40 cc, medtronics    REPLACEMENT OF CATHETER N/A 10/31/2019    Procedure: REPLACEMENT, CATHETER-SUPRAPUBIC;  Surgeon: Shireen Mayo MD;  Location: [facility] OR [address] [place] [address];  Service: Urology;  Laterality: N/A;    SPINAL CORD STIMULATOR REMOVAL      before [place]    SPINE SURGERY  5-13-13    CERVICAL FUSION    TONSILLECTOMY       Family History   Problem Relation Age of Onset    Cancer Mother 55        breast    Cancer Father         esophagus,had laryngectomy    Esophageal cancer Father     Parkinsonism Maternal Grandmother     Tremor Maternal Grandmother     No Known Problems Brother     No Known Problems Brother     Heart disease Maternal Uncle     Colon cancer Maternal Uncle         Less than 60    No Known Problems  "Sister     No Known Problems Maternal Aunt     Cirrhosis Paternal Aunt         ETOH    Liver disease Paternal Aunt         ETOH    Liver disease Paternal Uncle         ETOH    Cirrhosis Paternal Uncle         ETOH    No Known Problems Maternal Grandfather     No Known Problems Paternal Grandmother     No Known Problems Paternal Grandfather     Melanoma Neg Hx     Amblyopia Neg Hx     Blindness Neg Hx     Cataracts Neg Hx     Diabetes Neg Hx     Glaucoma Neg Hx     Hypertension Neg Hx     Macular degeneration Neg Hx     Retinal detachment Neg Hx     Strabismus Neg Hx     Stroke Neg Hx     Thyroid disease Neg Hx     Celiac disease Neg Hx     Colon polyps Neg Hx     Cystic fibrosis Neg Hx     Crohn's disease Neg Hx     Inflammatory bowel disease Neg Hx     Liver cancer Neg Hx     Rectal cancer Neg Hx     Stomach cancer Neg Hx     Ulcerative colitis Neg Hx     Lymphoma Neg Hx      Social History     Socioeconomic History    Marital status:    Tobacco Use    Smoking status: Never    Smokeless tobacco: Never   Substance and Sexual Activity    Alcohol use: Never    Drug use: Yes     Types: Marijuana     Comment: "medical marijuana gummies"     Social Determinants of Health     Financial Resource Strain: Low Risk  (9/11/2023)    Overall Financial Resource Strain (CARDIA)     Difficulty of Paying Living Expenses: Not hard at all   Food Insecurity: No Food Insecurity (9/11/2023)    Hunger Vital Sign     Worried About Running Out of Food in the Last Year: Never true     Ran Out of Food in the Last Year: Never true   Transportation Needs: No Transportation Needs (9/11/2023)    PRAPARE - Transportation     Lack of Transportation (Medical): No     Lack of Transportation (Non-Medical): No   Physical Activity: Inactive (9/11/2023)    Exercise Vital Sign     Days of Exercise per Week: 0 days     Minutes of Exercise per Session: 0 min   Stress: Stress Concern Present (9/11/2023)    [employer] - " Occupational Stress Questionnaire     Feeling of Stress : To some extent   Social Connections: Moderately Isolated (9/11/2023)    Social Connection and Isolation Panel [NHANES]     Frequency of Communication with Friends and Family: More than three times a week     Frequency of Social Gatherings with Friends and Family: Three times a week     Attends Jehovah's witness Services: Never     Active Member of Clubs or Organizations: No     Attends Club or Organization Meetings: Never     Marital Status:    Housing Stability: Low Risk  (9/11/2023)    Housing Stability Vital Sign     Unable to Pay for Housing in the Last Year: No     Number of Places Lived in the Last Year: 1     Unstable Housing in the Last Year: No     Review of patient's allergies indicates:   Allergen Reactions    (d)-limonene flavor      Other reaction(s): difficult intubation  Other reaction(s): Difficulty breathing    Benadryl [diphenhydramine hcl] Shortness Of Breath    Fentanyl Itching, Nausea And Vomiting and Swelling             Imitrex [sumatriptan succinate] Shortness Of Breath    Oxycodone-acetaminophen Shortness Of Breath    Sulfamethoxazole-trimethoprim Anaphylaxis    Topiramate Shortness Of Breath    Vancomycin Anaphylaxis     Rash    Butorphanol tartrate     Evening primrose (oenothera biennis)     Latex     Pregabalin Other (See Comments)     tremors    Propoxyphene n-acetaminophen      Other reaction(s): Difficulty breathing    Sumatriptan     White petrolatum-zinc     Zinc acetate     Zinc oxide-white petrolatum      Other reaction(s): Difficulty breathing    Latex, natural rubber Itching and Rash    Phenytoin Rash and Other (See Comments)     Trouble breathing       Review of Systems   Cardiovascular:  Positive for leg swelling.   Musculoskeletal:  Positive for arthralgias and myalgias.         Physical Exam:     Initial Vitals [01/03/24 2131]   BP Pulse Resp Temp SpO2   116/70 81 20 97.7 °F (36.5 °C) 100 %      MAP       --          Physical Exam    Nursing note and vitals reviewed.  Constitutional: She appears well-developed and well-nourished. She is not diaphoretic. No distress.   HENT:   Head: Normocephalic and atraumatic.   Mouth/Throat: Oropharynx is clear and moist.   NC in place.    Eyes: EOM are normal. Pupils are equal, round, and reactive to light.   Neck: No tracheal deviation present.   Cardiovascular:  Normal rate, regular rhythm, normal heart sounds and intact distal pulses.           Pulmonary/Chest: Breath sounds normal. No stridor. No respiratory distress. She has no wheezes.   Abdominal: Abdomen is soft and protuberant. Bowel sounds are normal. She exhibits no distension and no mass. There is no abdominal tenderness.   Musculoskeletal:         General: Edema present. Normal range of motion.      Comments: Diffuse bilateral lymphedema with chronic-appearing venous stasis changes. No deformity.      Neurological: She is alert and oriented to person, place, and time. She has normal strength. No cranial nerve deficit or sensory deficit.   Skin: Skin is warm and dry. Capillary refill takes less than 2 seconds. No pallor.   Psychiatric: She has a normal mood and affect. Her behavior is normal. Thought content normal.         Differential Diagnoses:   Based on available information and initial assessment, Differential Diagnosis includes, but is not limited to:  Fracture, dislocation, compartment syndrome, nerve injury/palsy, vascular injury, DVT, rhabdomyolysis, hemarthrosis, septic joint, cellulitis, bursitis, muscle strain, ligament tear/sprain, laceration, foreign body, abrasion, soft tissue contusion, osteoarthritis.      ED Management:   Procedures    Orders Placed This Encounter    X-Ray Knee 3 View Bilateral    X-Ray Ankle Complete Bilateral    X-Ray Chest AP Portable          EKG:       Labs:   Labs Reviewed - No data to display  Independent review of the labs ordered include:   none    Imaging:     Imaging Results               X-Ray Chest AP Portable (Final result)  Result time 01/04/24 01:09:44      Final result by Josh Hernadez MD (01/04/24 01:09:44)                   Impression:      Near complete if not complete resolution of infiltrate and effusion in the left lung base.    No acute findings evident elsewhere in the chest.      Electronically signed by: Josh Hernadez  Date:    01/04/2024  Time:    01:09               Narrative:    EXAMINATION:  XR CHEST AP PORTABLE    CLINICAL HISTORY:  weakness, shortness of breath, history of pneumonia;    TECHNIQUE:  Single frontal view of the chest was performed.    COMPARISON:  12/19/2023    FINDINGS:  The heart and mediastinal contours remain stable.  ACDF plate spinal cord stimulator remain.  Pleural and parenchymal opacities in the left lung base significantly diminished if not resolved.  The hiatal hernia density behind the midline is stable.  No new infiltrate.                                       X-Ray Knee 3 View Bilateral (Final result)  Result time 01/04/24 01:40:10      Final result by Josh Hernadez MD (01/04/24 01:40:10)                   Impression:      No acute findings evident the right knee.      Electronically signed by: Josh Hernadez  Date:    01/04/2024  Time:    01:40               Narrative:    EXAMINATION:  XR KNEE 3 VIEW BILATERAL    CLINICAL HISTORY:  Pain in right knee    TECHNIQUE:  AP, lateral, and Merchant views of both knees were performed.    COMPARISON:  07/18/2023    FINDINGS:  Bones appear intact with osteoarthritis mainly in the patellofemoral compartment.  No fracture or effusion.  Soft tissues is edema in the subcu fat my.                                       X-Ray Ankle Complete Bilateral (Final result)  Result time 01/04/24 01:38:15      Final result by Josh Hernadez MD (01/04/24 01:38:15)                   Impression:      Stable chronic changes of the hindfoot and ankle with edema in the subcutaneous soft tissues.   Correlate for cellulitis versus edema.      Electronically signed by: Josh Hernadez  Date:    01/04/2024  Time:    01:38               Narrative:    EXAMINATION:  XR ANKLE COMPLETE 3 VIEW BILATERAL    CLINICAL HISTORY:  Unspecified fall, initial encounter    TECHNIQUE:  AP, lateral and oblique views of both ankles were performed.    COMPARISON:  Is 12/28/2023    FINDINGS:  The degenerative changes in the mid and hindfoot with no acute fracture.  Soft tissue swelling remains with marbled appearance of the subcu fat.                                         Medications Given:   Medications - No data to display     Medical Decision Making:    Additional Consideration:   Additional testing considered during clinical course: none    Social determinants of health considered during development of treatment plan include: poor access to care    Current co-morbidities considered which impacted clinical decision making: chronic lymphedema, chronic pain, scoliosis, anxiety, CHF, chronic respiratory failure, pHTN, CKD, anemia, obesity    Case discussed with additional provider: none    ED Course as of 01/04/24 2057   Thu Jan 04, 2024   0155 X-Ray Chest AP Portable  CXR independently interpreted: no focal infiltrate, effusion, edema, free air, or other acute process.  Agree with radiologist interpretation.    [SS]   0156 X-Ray Ankle Complete Bilateral  Ankle x-ray independently interpreted: no fracture or dislocation.  Agree with radiologist interpretation.    [SS]   0156 X-Ray Knee 3 View Bilateral  Knee x-ray independently interpreted: no fracture or dislocation.  Agree with radiologist interpretation.    [SS]   0156 SpO2: 100 % [SS]   0156 Resp: 20 [SS]   0156 Pulse: 81 [SS]   0156 Temp Source: Oral [SS]   0156 Temp: 97.7 °F (36.5 °C) [SS]   0156 BP: 116/70  Vitals reassuring [SS]      ED Course User Index  [SS] Jesus Aquino MD            Medical Decision Making  67 yo F with chronic lymphedema, chronic pain, scoliosis,  anxiety, CHF, chronic respiratory failure, pHTN, CKD, anemia, obesity presents to ED for evaluation after her L leg gave out, causing her to fall. Reports diffuse, chronic bilateral leg pain.  Vitals reassuring. Exam consistent with bilateral venous stasis changes and severe lymphedema.   X-rays negative for fracture. Vitals stable. Patient has upcoming Podiatry appointment later today. Will DC with supportive care. Patient has chronic pain and pain infusion as well as oral pain medication to take at home on D/C.     Problems Addressed:  Bilateral ankle pain: acute illness or injury  Fall: acute illness or injury  Knee pain, bilateral: acute illness or injury  Lymphedema: chronic illness or injury with exacerbation, progression, or side effects of treatment    Amount and/or Complexity of Data Reviewed  External Data Reviewed: notes.  Radiology: ordered and independent interpretation performed. Decision-making details documented in ED Course.        Clinical Impression:       ICD-10-CM ICD-9-CM   1. Lymphedema  I89.0 457.1   2. Knee pain, bilateral  M25.561 719.46    M25.562    3. Fall  W19.XXXA E888.9   4. Bilateral ankle pain  M25.571 719.47    M25.572          Follow-up Information       Follow up With Specialties Details Why Contact Info    Mesfin Hodges II, MD Internal Medicine Schedule an appointment as soon as possible for a visit   [address]  [place] [address]  [phone]               ED Disposition Condition    Discharge Stable              On re-evaluation, the patient's status has improved.  After complete ED evaluation, clinical impression is most consistent with fall, chronic lymphedema.  PCP follow-up as soon as possible was recommended.    After taking into careful account the patient's history, physical exam findings, as well as empirical and objective data obtained throughout ED workup, I feel no emergent medical condition has been identified. No further evaluation or admission was  felt to be required, and the patient is stable for discharge from the ED. The patient and any additional family present were updated with test results, overall clinical impression, and recommended further plan of care, including discharge instructions as provided and outpatient follow-up for continued evaluation and management as needed. All questions were answered. The patient expressed understanding and agreed with current plan for discharge and follow-up plan of care. Strict ED return precautions were provided, including return/worsening of current symptoms, new symptoms, or any other concerns.       Jesus Aquino MD  01/04/24 3801

## 2024-01-04 NOTE — DISCHARGE INSTRUCTIONS
Thank you for choosing [facility]!     Our goal in the Emergency Department is to always provide outstanding medical care. You may receive a survey by mail or e-mail in the next week regarding your experience today. We would greatly appreciate you completing and returning the survey. Your feedback provides us with a way to recognize our staff who provide very good care, and it helps us learn how to improve when your experience was below our aspiration of excellence.      It is important to remember that some problems are difficult to diagnose and may not be found during your first visit. Be sure to follow up with your primary care doctor and review any labs/imaging that was performed during your visit with them. If you do not have a primary care doctor, you may contact the one listed on your discharge paperwork, or you may also call the [facility] Appointment Desk at [phone] to schedule an appointment.     All medications may potentially have side effects and it is impossible to predict which medications may give you side effects. If you feel that you are having a negative effect of any medication you should immediately stop taking them and seek medical attention.  Do not drive or make any important decisions for 24 hours if you have received any pain medications, sedatives or mood altering drugs during your ER visit.    We appreciate you trusting us with your medical care. We will be happy to take care of you for all of your future medical needs. You may return to the ER at any time for any new/concerning symptoms, worsening condition, or failure to improve. We hope you feel better soon.     Sincerely,    Jesus Aquino Jr., MD  Board-Certified Emergency Medicine Physician  [facility]

## 2024-01-05 ENCOUNTER — LAB VISIT (OUTPATIENT)
Dept: LAB | Facility: HOSPITAL | Age: 69
End: 2024-01-05
Attending: INTERNAL MEDICINE
Payer: MEDICARE

## 2024-01-05 ENCOUNTER — OFFICE VISIT (OUTPATIENT)
Dept: INTERNAL MEDICINE | Facility: CLINIC | Age: 69
End: 2024-01-05
Payer: MEDICARE

## 2024-01-05 VITALS — SYSTOLIC BLOOD PRESSURE: 134 MMHG | HEART RATE: 67 BPM | DIASTOLIC BLOOD PRESSURE: 70 MMHG | OXYGEN SATURATION: 99 %

## 2024-01-05 DIAGNOSIS — I83.009 VENOUS ULCER: ICD-10-CM

## 2024-01-05 DIAGNOSIS — L97.909 VENOUS ULCER: ICD-10-CM

## 2024-01-05 DIAGNOSIS — E27.40 ADRENAL INSUFFICIENCY: ICD-10-CM

## 2024-01-05 DIAGNOSIS — F11.20 NARCOTIC DEPENDENCY, CONTINUOUS: ICD-10-CM

## 2024-01-05 DIAGNOSIS — I50.22 CHRONIC SYSTOLIC HEART FAILURE: ICD-10-CM

## 2024-01-05 DIAGNOSIS — Z93.59 SUPRAPUBIC CATHETER: Chronic | ICD-10-CM

## 2024-01-05 DIAGNOSIS — J96.11 CHRONIC RESPIRATORY FAILURE WITH HYPOXIA AND HYPERCAPNIA: ICD-10-CM

## 2024-01-05 DIAGNOSIS — F33.0 MAJOR DEPRESSIVE DISORDER, RECURRENT, MILD: ICD-10-CM

## 2024-01-05 DIAGNOSIS — E03.9 HYPOTHYROIDISM (ACQUIRED): ICD-10-CM

## 2024-01-05 DIAGNOSIS — I25.119 CORONARY ARTERY DISEASE INVOLVING NATIVE CORONARY ARTERY OF NATIVE HEART WITH ANGINA PECTORIS: ICD-10-CM

## 2024-01-05 DIAGNOSIS — N18.31 STAGE 3A CHRONIC KIDNEY DISEASE: ICD-10-CM

## 2024-01-05 DIAGNOSIS — I89.0 LYMPHEDEMA OF BOTH LOWER EXTREMITIES: ICD-10-CM

## 2024-01-05 DIAGNOSIS — E66.01 SEVERE OBESITY (BMI 35.0-39.9) WITH COMORBIDITY: ICD-10-CM

## 2024-01-05 DIAGNOSIS — F11.90 CHRONIC, CONTINUOUS USE OF OPIOIDS: ICD-10-CM

## 2024-01-05 DIAGNOSIS — J96.12 CHRONIC RESPIRATORY FAILURE WITH HYPOXIA AND HYPERCAPNIA: ICD-10-CM

## 2024-01-05 DIAGNOSIS — G47.01 INSOMNIA DUE TO MEDICAL CONDITION: Chronic | ICD-10-CM

## 2024-01-05 DIAGNOSIS — I89.0 LYMPHEDEMA OF BOTH LOWER EXTREMITIES: Primary | ICD-10-CM

## 2024-01-05 DIAGNOSIS — Z43.5 ENCOUNTER FOR ATTENTION TO CYSTOSTOMY: ICD-10-CM

## 2024-01-05 LAB
ALBUMIN SERPL BCP-MCNC: 3.4 G/DL (ref 3.5–5.2)
ALP SERPL-CCNC: 165 U/L (ref 55–135)
ALT SERPL W/O P-5'-P-CCNC: 9 U/L (ref 10–44)
ANION GAP SERPL CALC-SCNC: 13 MMOL/L (ref 8–16)
AST SERPL-CCNC: 14 U/L (ref 10–40)
BILIRUB SERPL-MCNC: 0.4 MG/DL (ref 0.1–1)
BUN SERPL-MCNC: 11 MG/DL (ref 8–23)
CALCIUM SERPL-MCNC: 9.4 MG/DL (ref 8.7–10.5)
CHLORIDE SERPL-SCNC: 97 MMOL/L (ref 95–110)
CO2 SERPL-SCNC: 32 MMOL/L (ref 23–29)
CREAT SERPL-MCNC: 1 MG/DL (ref 0.5–1.4)
EST. GFR  (NO RACE VARIABLE): >60 ML/MIN/1.73 M^2
GLUCOSE SERPL-MCNC: 98 MG/DL (ref 70–110)
POTASSIUM SERPL-SCNC: 3.8 MMOL/L (ref 3.5–5.1)
PROT SERPL-MCNC: 7.8 G/DL (ref 6–8.4)
SODIUM SERPL-SCNC: 142 MMOL/L (ref 136–145)
T4 FREE SERPL-MCNC: 0.71 NG/DL (ref 0.71–1.51)
TSH SERPL DL<=0.005 MIU/L-ACNC: 24.6 UIU/ML (ref 0.4–4)

## 2024-01-05 PROCEDURE — 36415 COLL VENOUS BLD VENIPUNCTURE: CPT | Performed by: INTERNAL MEDICINE

## 2024-01-05 PROCEDURE — 3288F FALL RISK ASSESSMENT DOCD: CPT | Mod: CPTII,S$GLB,, | Performed by: INTERNAL MEDICINE

## 2024-01-05 PROCEDURE — 84443 ASSAY THYROID STIM HORMONE: CPT | Performed by: INTERNAL MEDICINE

## 2024-01-05 PROCEDURE — 1159F MED LIST DOCD IN RCRD: CPT | Mod: CPTII,S$GLB,, | Performed by: INTERNAL MEDICINE

## 2024-01-05 PROCEDURE — 1101F PT FALLS ASSESS-DOCD LE1/YR: CPT | Mod: CPTII,S$GLB,, | Performed by: INTERNAL MEDICINE

## 2024-01-05 PROCEDURE — 1160F RVW MEDS BY RX/DR IN RCRD: CPT | Mod: CPTII,S$GLB,, | Performed by: INTERNAL MEDICINE

## 2024-01-05 PROCEDURE — 3075F SYST BP GE 130 - 139MM HG: CPT | Mod: CPTII,S$GLB,, | Performed by: INTERNAL MEDICINE

## 2024-01-05 PROCEDURE — 99215 OFFICE O/P EST HI 40 MIN: CPT | Mod: S$GLB,,, | Performed by: INTERNAL MEDICINE

## 2024-01-05 PROCEDURE — 99999 PR PBB SHADOW E&M-EST. PATIENT-LVL III: CPT | Mod: PBBFAC,,, | Performed by: INTERNAL MEDICINE

## 2024-01-05 PROCEDURE — 84439 ASSAY OF FREE THYROXINE: CPT | Performed by: INTERNAL MEDICINE

## 2024-01-05 PROCEDURE — 3078F DIAST BP <80 MM HG: CPT | Mod: CPTII,S$GLB,, | Performed by: INTERNAL MEDICINE

## 2024-01-05 PROCEDURE — 80053 COMPREHEN METABOLIC PANEL: CPT | Performed by: INTERNAL MEDICINE

## 2024-01-05 RX ORDER — GABAPENTIN 100 MG/1
60 CAPSULE ORAL 3 TIMES DAILY
COMMUNITY
End: 2024-01-17

## 2024-01-05 NOTE — PROGRESS NOTES
Subjective:       Patient ID: Tasha Hawley is a 68 y.o. female.    Chief Complaint:   Follow-up    HPI - Chronically ill, difficult to manage patient of mine here for f/u.  She continues to struggle with bilateral LE lymphedema that causes pain.  She hasn't been keeping appointments with the lymphedema clinic d/t transportation issues.  No longer walks; currently in a motorized wheelchair.  She agreed to covid booster today.  Agreed with me that she would do better in a facility with more resources than she has access to at home.  Requested refills on her oral narcotics; I declined.  She still has a suprapubic catheter.  Lab review showed that last tsh was over 30.    She is compliant with her non-invasive ventilator, which I recommend she continue the use of for respiratory failure    PMH:  Neurogenic bladder, with recurrent UTI's. Followed by urogyn (Milena).  Suprapubic catheter  CAD, with ACS in Jan 2016 - subtot mid LAD lesion without PCI; ischemic CM with EF 40% and chronic LE edema. Followed by [facility]  Chronic insomnia  Lymphedema bilateral LE's  Intermittent nausea  Chronic low back pain with narcotic use.  Indwelling narcotic pump  History CVA with dysarthria and swallowing difficulty  Hypothyroidism  CECI, on CPAP  Anxiety and Depression  Chronic constipation, d/t ongoing narcotic use.  Dependent on home oxygen     Meds: Reviewed and reconciled in EPIC with patient during visit today.     Review of Systems   Constitutional:  Negative for fever.   HENT:  Negative for congestion.    Respiratory:  Negative for shortness of breath.    Cardiovascular:  Positive for leg swelling.   Gastrointestinal:  Negative for abdominal pain.   Genitourinary:  Negative for difficulty urinating.   Musculoskeletal:  Positive for arthralgias, back pain and gait problem.   Skin:  Negative for rash.   Neurological:  Negative for headaches.   Psychiatric/Behavioral:  Positive for sleep disturbance.         Objective:      Physical Exam  Constitutional:       Appearance: She is obese.   HENT:      Head: Normocephalic and atraumatic.   Pulmonary:      Effort: Pulmonary effort is normal.   Musculoskeletal:      Right lower leg: Edema present.      Left lower leg: Edema present.   Neurological:      Mental Status: She is alert. Mental status is at baseline.   Psychiatric:         Mood and Affect: Mood normal.         Assessment:       1. Lymphedema of both lower extremities    2. Chronic, continuous use of opioids    3. Chronic respiratory failure with hypoxia and hypercapnia    4. Adrenal insufficiency    5. Narcotic dependency, continuous    6. Stage 3a chronic kidney disease    7. Venous ulcer    8. Encounter for attention to cystostomy    9. Major depressive disorder, recurrent, mild    10. Chronic systolic heart failure    11. Coronary artery disease involving native coronary artery of native heart with angina pectoris    12. Severe obesity (BMI 35.0-39.9) with comorbidity    13. Suprapubic catheter    14. Insomnia due to medical condition    15. Hypothyroidism (acquired)        Plan:       Tasha was seen today for follow-up.    Diagnoses and all orders for this visit:    Lymphedema of both lower extremities - at the moment, this is her biggest issue.  Encouraged her to go to lymphedema clinic as much as possible.  Will work on arranging some home care for this as well  -     COMPREHENSIVE METABOLIC PANEL; Future  -     Ambulatory referral/consult to Social Work; Future    Chronic, continuous use of opioids - longstanding.  I do not fill this and will not today.  Will engage social work  -     Ambulatory referral/consult to Social Work; Future    Chronic respiratory failure with hypoxia and hypercapnia - wearing oxygen today, with reasonable sats.  Continue to monitor  -     Ambulatory referral/consult to Social Work; Future    Adrenal insufficiency - stable on medication, though with her polypharmacy, I'm not sure  she's taking it.  monitor    Narcotic dependency, continuous    Stage 3a chronic kidney disease - stable.  monitor    Venous ulcer    Encounter for attention to cystostomy - stable suprapubic catheter.  Monitor     Major depressive disorder, recurrent, mild    Chronic systolic heart failure    Coronary artery disease involving native coronary artery of native heart with angina pectoris    Severe obesity (BMI 35.0-39.9) with comorbidity    Suprapubic catheter - as above.    Insomnia due to medical condition - fairly stable now.  Continue to monitor    Hypothyroidism (acquired) - amazingly high tsh  makes me think she isn't adherent to medications.  Will repeat and address via paring down astoundingly long med list to essentials.  -     TSH; Future    Rtc prn, or in 3 months    PAPO Hodges MD MPH  Staff Internist

## 2024-01-08 ENCOUNTER — DOCUMENT SCAN (OUTPATIENT)
Dept: HOME HEALTH SERVICES | Facility: HOSPITAL | Age: 69
End: 2024-01-08
Payer: MEDICARE

## 2024-01-08 ENCOUNTER — PES CALL (OUTPATIENT)
Dept: HOME HEALTH SERVICES | Facility: CLINIC | Age: 69
End: 2024-01-08
Payer: MEDICARE

## 2024-01-08 PROBLEM — J96.21 ACUTE ON CHRONIC RESPIRATORY FAILURE WITH HYPOXIA AND HYPERCAPNIA: Status: RESOLVED | Noted: 2023-07-18 | Resolved: 2024-01-08

## 2024-01-08 PROBLEM — J96.22 ACUTE ON CHRONIC RESPIRATORY FAILURE WITH HYPOXIA AND HYPERCAPNIA: Status: RESOLVED | Noted: 2023-07-18 | Resolved: 2024-01-08

## 2024-01-09 ENCOUNTER — PES CALL (OUTPATIENT)
Dept: HOME HEALTH SERVICES | Facility: CLINIC | Age: 69
End: 2024-01-09
Payer: MEDICARE

## 2024-01-09 ENCOUNTER — TELEPHONE (OUTPATIENT)
Dept: UROLOGY | Facility: CLINIC | Age: 69
End: 2024-01-09
Payer: MEDICARE

## 2024-01-09 NOTE — TELEPHONE ENCOUNTER
----- Message from Benedictkyreeparish ALARCON Route sent at 1/9/2024  8:09 AM CST -----  Regarding: Catheter Change  Contact: pt.  [phone]  Pt is calling to speak with nurse in ref to needing catheter changed out by Elida.  Would like to speak with Christi. Patient Requesting Call Back @  [phone]

## 2024-01-09 NOTE — TELEPHONE ENCOUNTER
"Notified pt that I cannot override Elida's schedule for her. She is currently scheduled for first available appt w/ NP, Jeaneth Ac. States," I really dont wanna come in bc I have so much on my plate and I know Elida." Advised that she speaks w/ his nurse but his schedule is full being that he is the primary NP for our oncology pts.   "

## 2024-01-10 ENCOUNTER — CLINICAL SUPPORT (OUTPATIENT)
Dept: REHABILITATION | Facility: HOSPITAL | Age: 69
End: 2024-01-10
Payer: MEDICARE

## 2024-01-10 ENCOUNTER — DOCUMENTATION ONLY (OUTPATIENT)
Dept: REHABILITATION | Facility: HOSPITAL | Age: 69
End: 2024-01-10

## 2024-01-10 ENCOUNTER — TELEPHONE (OUTPATIENT)
Dept: REHABILITATION | Facility: HOSPITAL | Age: 69
End: 2024-01-10

## 2024-01-10 DIAGNOSIS — M79.604 LEG PAIN, BILATERAL: ICD-10-CM

## 2024-01-10 DIAGNOSIS — I89.0 LYMPHEDEMA OF BOTH LOWER EXTREMITIES: ICD-10-CM

## 2024-01-10 DIAGNOSIS — M79.605 LEG PAIN, BILATERAL: ICD-10-CM

## 2024-01-10 DIAGNOSIS — R29.6 FREQUENT FALLS: Primary | ICD-10-CM

## 2024-01-10 PROCEDURE — 97140 MANUAL THERAPY 1/> REGIONS: CPT

## 2024-01-10 NOTE — PROGRESS NOTES
1/9/2024    Pt arrived for 2:00pm appointment at 2:36. Pt had called saying she would be delayed but would make it before cut off time of 2:15pm. Pt and therapist discussed that 2:36 is too late to achieve anything in therapy and that unfortunately another pt was scheduled at 3:00pm so she could not be seen that day. Pt was offered next day appt and therapist asked if there was anything that could make attendance easier on pt, which pt denied. Will see pt 1/10/2024 and calendar printed for pt     Jailene Zaman, PT, DPT, CLT

## 2024-01-10 NOTE — TELEPHONE ENCOUNTER
Returning pt's call from this morning     Pt reports she is in excruciating pain in legs and did not sleep last night. When asked pt reports this pain has been going on for awhile, is not acute. Pt educated that in order to make recommendation on plan going forward, would need to see pt in clinic to assess her legs and so attendance to visit today is recommended. Confirmed pt's visit time today and pt reports she will try     Jailene Zaman, PT, DPT, CLT\

## 2024-01-11 ENCOUNTER — OUTPATIENT CASE MANAGEMENT (OUTPATIENT)
Dept: ADMINISTRATIVE | Facility: OTHER | Age: 69
End: 2024-01-11
Payer: MEDICARE

## 2024-01-11 ENCOUNTER — CARE AT HOME (OUTPATIENT)
Dept: HOME HEALTH SERVICES | Facility: CLINIC | Age: 69
End: 2024-01-11
Payer: MEDICARE

## 2024-01-11 ENCOUNTER — CLINICAL SUPPORT (OUTPATIENT)
Dept: REHABILITATION | Facility: HOSPITAL | Age: 69
End: 2024-01-11
Payer: MEDICARE

## 2024-01-11 DIAGNOSIS — I89.0 LYMPHEDEMA OF BOTH LOWER EXTREMITIES: ICD-10-CM

## 2024-01-11 DIAGNOSIS — G89.4 CHRONIC PAIN SYNDROME: ICD-10-CM

## 2024-01-11 DIAGNOSIS — M79.605 LEG PAIN, BILATERAL: ICD-10-CM

## 2024-01-11 DIAGNOSIS — J96.12 CHRONIC RESPIRATORY FAILURE WITH HYPOXIA AND HYPERCAPNIA: ICD-10-CM

## 2024-01-11 DIAGNOSIS — R29.6 FREQUENT FALLS: Primary | ICD-10-CM

## 2024-01-11 DIAGNOSIS — J96.11 CHRONIC RESPIRATORY FAILURE WITH HYPOXIA AND HYPERCAPNIA: ICD-10-CM

## 2024-01-11 DIAGNOSIS — M79.604 LEG PAIN, BILATERAL: ICD-10-CM

## 2024-01-11 PROCEDURE — 99347 HOME/RES VST EST SF MDM 20: CPT | Mod: S$GLB,,, | Performed by: NURSE PRACTITIONER

## 2024-01-11 PROCEDURE — 97140 MANUAL THERAPY 1/> REGIONS: CPT

## 2024-01-11 NOTE — PROGRESS NOTES
[facility]  Medical Chronic Care Home Visit    Encounter Provider: Gracie Vega   PCP: Mesfin Hodges II, MD  Consult Requested By: Dr. Mesfin Hodges II    HISTORY OF PRESENT ILLNESS      Patient ID: Tasha Hawley is a 68 y.o. female is being seen in the home due to physical debility that presents a taxing effort to leave the home, to mitigate high risk of hospital readmission and/or due to the limited availability of reliable or safe options for transportation to the point of access to the provider. Prior to treatment on this visit the chart was reviewed and patient verbal consent was obtained.    Chronic medical conditions synopsis:    Pt referred to [facility] for mobility issues  Pt has lymphedema and difficulty getting out of home.  Due to her going to clinic for lymphedema treatment, she is not homebound and HH cannot assist with SPT replacement. Pt being seen today to establish care.    She is found in her home getting dressed for PT appt.  She reports she does not want to change SPT today due to this visit. Reports dressing and getting to appt is time consuming and tiring. Will call pt tomorrow to schedule SPT exchange.      DECISION MAKING TODAY       Assessment & Plan:  1. Lymphedema of both lower extremities  -     Ambulatory referral/consult to [facility]    2. Chronic pain syndrome  -     [facility]    3. Chronic respiratory failure with hypoxia and hypercapnia  -     Ambulatory referral/consult to [facility]     - Continue all medications, treatments and therapies as ordered.   - Follow all instructions, recommendations as discussed.  - Maintain Safety Precautions at all times.  - Attend all medical appointments as scheduled.  - For worsening symptoms: call Primary Care Physician or Nurse Practitioner.  - For emergencies, call 911 or immediately  report to the nearest emergency room.      Pt aborted visit early      ENVIRONMENT OF CARE      Family and/or Caregiver present at visit?  Yes  Name of Caregiver:   History provided by: patient    4Ms for Medical Decision-Making in Older Adults    Last Completed EAWV: None    MOBILITY:  Get Up and Go:       No data to display              Activities of Daily Living:      10/24/2018    10:23 AM   Activities of Daily Living   Ambulation Assistance Required   Ambulation Assistance Walker (wheeled)   Dressing Assistance Required   Dressing Assistance Moderate   Number of people 1   Transfers Assistance Required   Transfers Assistance Moderate   Number of people 1   Toileting Continent of bladder   Feeding Independent   Cleaning home/Chores Assistance Required   Telephone use Independent   Shopping Assistance Required   Paying bills Assistance Required   Taking meds Independent     Whisper Test:      10/24/2018    10:19 AM   Whisper Test   Whisper Test N/A- Hearing Impairment     Disability Status:      10/24/2018    10:25 AM   Disability Status   Are you deaf or do you have serious difficulty hearing? Y   Are you blind or do you have serious difficulty seeing, even when wearing glasses? N   Because of a physical, mental, or emotional condition, do you have serious difficulty concentrating, remembering, or making decisions? Y   Do you have serious difficulty walking or climbing stairs? Y   Do you have difficulty dressing or bathing? Y   Because of a physical, mental, or emotional condition, do you have difficulty doing errands alone such as visiting a doctor's office or shopping? Y     Nutrition Screening:      10/24/2018    10:22 AM   Nutrition Screening   Has food intake declined over the past three months due to loss of appetite, digestive problems, chewing or swallowing difficulties? Moderate decrease in food intake   Involuntary weight loss during the last 3 months? Weight loss greater than 3 kg (6.6 pounds)    Mobility? Able to get out of bed/chair, but does not go out   Has the patient suffered psychological stress or acute disease in the past three months? Yes   Neuropsychological problems? No psychological problems   Body Mass Index (BMI)?  BMI 23 or greater   Screening Score 7    Screening Score: 0-7 Malnourished, 8-11 At Risk, 12-14 Normal    MENTATION:   Depression Patient Health Questionnaire:      12/28/2023     2:54 PM   Depression Patient Health Questionnaire   Over the last two weeks how often have you been bothered by little interest or pleasure in doing things Not at all   Over the last two weeks how often have you been bothered by feeling down, depressed or hopeless Not at all   PHQ-2 Total Score 0     Has Dementia Dx: No    Cognitive Function Screening:      10/24/2018    10:20 AM   Cognitive Function Screening   Mini-Cog 3 Minute Recall 3   Cognitive Function Screening 6     Cognitive Function Screening Total - Less than 4 = Abnormal,  Greater than or equal to 4 = Normal    MEDICATIONS:  High Risk Medications:  Total Active Medications: 4  DULoxetine - 60 MG  HYDROcodone-acetaminophen -  mg  QUEtiapine Tab - 100 MG  traMADoL - 50 mg    WHAT MATTERS MOST:  Advance Care Planning   ACP Status:   Patient has had an ACP conversation  Living Will: Yes  Power of : No  LaPOST: No      Impression upon entering the home:  Physical Dwelling: single family home   Appearance of home environment: cleaniness: clean  Functional Status: moderate assistance  Mobility: ambulatory with device  Nutritional access: adequate intake and access  Home Health: No, and will be referred today   DME/Supplies: rolling walker         Does patient have an ostomy (ileostomy, colostomy, suprapubic catheter, nephrostomy tube, tracheostomy, PEG tube, pleurex catheter, cholecystostomy, etc)?   Were BPAs reviewed? No    Social History     Socioeconomic History    Marital status:    Tobacco Use    Smoking status: Never     "Smokeless tobacco: Never   Substance and Sexual Activity    Alcohol use: Never    Drug use: Yes     Types: Marijuana     Comment: "medical marijuana gummies"     Social Determinants of Health     Financial Resource Strain: Low Risk  (9/11/2023)    Overall Financial Resource Strain (CARDIA)     Difficulty of Paying Living Expenses: Not hard at all   Food Insecurity: No Food Insecurity (9/11/2023)    Hunger Vital Sign     Worried About Running Out of Food in the Last Year: Never true     Ran Out of Food in the Last Year: Never true   Transportation Needs: No Transportation Needs (9/11/2023)    PRAPARE - Transportation     Lack of Transportation (Medical): No     Lack of Transportation (Non-Medical): No   Physical Activity: Inactive (9/11/2023)    Exercise Vital Sign     Days of Exercise per Week: 0 days     Minutes of Exercise per Session: 0 min   Stress: Stress Concern Present (9/11/2023)    [employer] [place] [employer] - Occupational Stress Questionnaire     Feeling of Stress : To some extent   Social Connections: Moderately Isolated (9/11/2023)    Social Connection and Isolation Panel [NHANES]     Frequency of Communication with Friends and Family: More than three times a week     Frequency of Social Gatherings with Friends and Family: Three times a week     Attends Denominational Services: Never     Active Member of Clubs or Organizations: No     Attends Club or Organization Meetings: Never     Marital Status:    Housing Stability: Low Risk  (9/11/2023)    Housing Stability Vital Sign     Unable to Pay for Housing in the Last Year: No     Number of Places Lived in the Last Year: 1     Unstable Housing in the Last Year: No         OBJECTIVE:     Vital Signs:  There were no vitals filed for this visit.    Review of Systems   Constitutional:  Positive for fatigue.   Cardiovascular:  Positive for leg swelling.   Musculoskeletal:  Positive for gait problem.       Physical Exam:  Physical " Exam  Musculoskeletal:         General: Swelling present.   Neurological:      Mental Status: She is oriented to person, place, and time.   Psychiatric:         Behavior: Behavior normal.         Thought Content: Thought content normal.         Judgment: Judgment normal.         INSTRUCTIONS FOR PATIENT:     Scheduled Follow-up, Appts Reviewed with Modifications if Needed: No  Future Appointments   Date Time Provider Department Center   1/18/2024  3:30 PM Zaman Jailene, PT [facility] [place] 2 fl   1/24/2024 10:30 AM Juan A Madera MD [facility] [address]   2/15/2024 11:30 AM Nic Carranza DPAGNES [facility] [place]   3/13/2024  1:00 PM Cat Cortés MD [facility]         Current Outpatient Medications:     aspirin (ECOTRIN) 81 MG EC tablet, Take 1 tablet (81 mg total) by mouth once daily., Disp: 90 tablet, Rfl: 3    atorvastatin (LIPITOR) 80 MG tablet, Take 1 tablet (80 mg total) by mouth once daily., Disp: 90 tablet, Rfl: 3    butalbital-acetaminophen-caffeine -40 mg (FIORICET, ESGIC) -40 mg per tablet, Take 1 tablet by mouth daily as needed., Disp: , Rfl:     ciprofloxacin HCl (CIPRO) 500 MG tablet, Take 1 tablet by mouth 2 (two) times daily., Disp: , Rfl:     cyanocobalamin, vitamin B-12, 5,000 mcg Subl, Place 1 tablet under the tongue once daily., Disp: , Rfl:     darifenacin (ENABLEX) 7.5 MG Tb24, TAKE ONE TABLET BY MOUTH EVERY DAY, Disp: 30 tablet, Rfl: 11    diclofenac sodium (VOLTAREN ARTHRITIS PAIN) 1 % Gel, Apply 4 g topically 4 (four) times daily., Disp: 150 g, Rfl: 5    docusate sodium (COLACE) 100 MG capsule, Take 200 mg by mouth every evening., Disp: , Rfl:     DULoxetine (CYMBALTA) 60 MG capsule, Take 1 capsule (60 mg total) by mouth 2 (two) times daily., Disp: 60 capsule, Rfl: 11    fludrocortisone (FLORINEF) 0.1 mg Tab, Take a half-tablet (50 mcg) by mouth once daily, Disp: 15 tablet, Rfl: 5    fluticasone propionate (FLONASE) 50 mcg/actuation nasal spray, 2  sprays (100 mcg total) by Each Nostril route once daily., Disp: 16 g, Rfl: 0    furosemide (LASIX) 40 MG tablet, Take 2 tablets (80 mg total) by mouth 2 (two) times a day., Disp: 360 tablet, Rfl: 3    HYDROcodone-acetaminophen (NORCO)  mg per tablet, Take 1 tablet by mouth every 6 (six) hours as needed., Disp: 30 tablet, Rfl: 0    hydrocortisone (CORTEF) 10 MG Tab, Take 1 tablet (10 mg total) by mouth every evening., Disp: 30 tablet, Rfl: 5    hydrOXYzine (ATARAX) 50 MG tablet, Take 1 tablet (50 mg total) by mouth every 4 (four) hours as needed for Anxiety., Disp: 30 tablet, Rfl: 0    intrathecal pain pump compound, Hydromorphone (7.5 mg/mL) infusion at 8.59 mg/day (0.3578 mg/hr) out of a total reservoir volume of 40 mL Pump filled monthly, Disp: , Rfl:     ketorolac (TORADOL) 10 mg tablet, Take 10 mg by mouth daily as needed., Disp: , Rfl:     levothyroxine (SYNTHROID) 150 MCG tablet, Take 1 tablet (150 mcg total) by mouth before breakfast., Disp: 90 tablet, Rfl: 3    LIDOcaine (LIDODERM) 5 %, Place 1 patch onto the skin once daily. Remove & Discard patch within 12 hours or as directed by MD, Disp: , Rfl: 0    liothyronine (CYTOMEL) 5 MCG Tab, Take 1 tablet (5 mcg total) by mouth once daily., Disp: 30 tablet, Rfl: 11    nitroGLYCERIN (NITROSTAT) 0.4 MG SL tablet, Place 0.4 mg under the tongue every 5 (five) minutes as needed for Chest pain., Disp: , Rfl:     nystatin (MYCOSTATIN) powder, Apply topically 4 (four) times daily., Disp: 60 g, Rfl: 0    ondansetron (ZOFRAN-ODT) 4 MG TbDL, Take 4 mg by mouth every 4 to 6 hours as needed., Disp: , Rfl:     pantoprazole (PROTONIX) 40 MG tablet, Take 1 tablet (40 mg total) by mouth once daily., Disp: 90 tablet, Rfl: 3    potassium chloride (MICRO-K) 10 MEQ CpSR, Take 2 capsules (20 mEq total) by mouth once daily., Disp: 60 capsule, Rfl: 11    promethazine (PHENERGAN) 25 MG tablet, Take 25 mg by mouth every 6 (six) hours as needed for Nausea., Disp: , Rfl:      QUEtiapine (SEROQUEL) 100 MG Tab, TAKE 2 TABLETS (200 MG) BY MOUTH NIGHTLY, Disp: 180 tablet, Rfl: 3    senna (SENNA LAX) 8.6 mg tablet, Take 2 tablets by mouth 2 (two) times daily., Disp: , Rfl:     tiotropium bromide (SPIRIVA RESPIMAT) 2.5 mcg/actuation inhaler, Inhale 2 puffs into the lungs once daily. Controller, Disp: 4 g, Rfl: 1    traMADoL (ULTRAM) 50 mg tablet, Take 1 tablet (50 mg total) by mouth every 4 (four) hours as needed for Pain., Disp: 30 tablet, Rfl: 0    traZODone (DESYREL) 300 MG tablet, Take 1 tablet (300 mg total) by mouth every evening., Disp: 90 tablet, Rfl: 3    triamcinolone acetonide 0.1% (KENALOG) 0.1 % cream, Apply topically 2 (two) times daily., Disp: 80 g, Rfl: 11    Medication Reconciliation:  Were medications changed during this appointment? No  Were medications in the home? Yes  Is the patient taking the medications as directed? Yes  Does the patient/caregiver understand the medications and changes? Yes  Does updated med list accurately reflects meds patient is currently taking? Yes    Signature: Gracie Vega NP

## 2024-01-11 NOTE — PROGRESS NOTES
Outpatient Care Management  Plan of Care Follow Up Visit    Patient: Tasha Hawley  MRN: [id number]  Date of Service: 01/11/2024  Completed by: Michelle Box RN  Referral Date: 04/17/2023    Reason for Visit   Patient presents with    Case Closure       Brief Summary: Case closure, unable to reach patient.

## 2024-01-12 ENCOUNTER — OFFICE VISIT (OUTPATIENT)
Dept: UROLOGY | Facility: CLINIC | Age: 69
End: 2024-01-12
Payer: MEDICARE

## 2024-01-12 ENCOUNTER — TELEPHONE (OUTPATIENT)
Dept: INTERNAL MEDICINE | Facility: CLINIC | Age: 69
End: 2024-01-12
Payer: MEDICARE

## 2024-01-12 ENCOUNTER — PATIENT MESSAGE (OUTPATIENT)
Dept: INTERNAL MEDICINE | Facility: CLINIC | Age: 69
End: 2024-01-12
Payer: MEDICARE

## 2024-01-12 VITALS
BODY MASS INDEX: 36.1 KG/M2 | HEART RATE: 65 BPM | DIASTOLIC BLOOD PRESSURE: 74 MMHG | SYSTOLIC BLOOD PRESSURE: 118 MMHG | HEIGHT: 67 IN | WEIGHT: 230 LBS

## 2024-01-12 DIAGNOSIS — N31.9 NEUROGENIC BLADDER: Primary | ICD-10-CM

## 2024-01-12 DIAGNOSIS — N39.46 MIXED STRESS AND URGE URINARY INCONTINENCE: ICD-10-CM

## 2024-01-12 DIAGNOSIS — Z93.59 SUPRAPUBIC CATHETER: ICD-10-CM

## 2024-01-12 PROCEDURE — 3008F BODY MASS INDEX DOCD: CPT | Mod: CPTII,S$GLB,,

## 2024-01-12 PROCEDURE — 1159F MED LIST DOCD IN RCRD: CPT | Mod: CPTII,S$GLB,,

## 2024-01-12 PROCEDURE — 3288F FALL RISK ASSESSMENT DOCD: CPT | Mod: CPTII,S$GLB,,

## 2024-01-12 PROCEDURE — 1160F RVW MEDS BY RX/DR IN RCRD: CPT | Mod: CPTII,S$GLB,,

## 2024-01-12 PROCEDURE — 51705 CHANGE OF BLADDER TUBE: CPT | Mod: S$GLB,,,

## 2024-01-12 PROCEDURE — 1101F PT FALLS ASSESS-DOCD LE1/YR: CPT | Mod: CPTII,S$GLB,,

## 2024-01-12 PROCEDURE — 99999 PR PBB SHADOW E&M-EST. PATIENT-LVL IV: CPT | Mod: PBBFAC,,,

## 2024-01-12 PROCEDURE — 1125F AMNT PAIN NOTED PAIN PRSNT: CPT | Mod: CPTII,S$GLB,,

## 2024-01-12 PROCEDURE — 99214 OFFICE O/P EST MOD 30 MIN: CPT | Mod: 25,S$GLB,,

## 2024-01-12 PROCEDURE — 3078F DIAST BP <80 MM HG: CPT | Mod: CPTII,S$GLB,,

## 2024-01-12 PROCEDURE — 3074F SYST BP LT 130 MM HG: CPT | Mod: CPTII,S$GLB,,

## 2024-01-12 RX ORDER — CIPROFLOXACIN 500 MG/1
1 TABLET ORAL 2 TIMES DAILY
Status: ON HOLD | COMMUNITY
Start: 2023-09-29 | End: 2024-02-19 | Stop reason: HOSPADM

## 2024-01-12 NOTE — TELEPHONE ENCOUNTER
----- Message from Mesfin Hodges II, MD sent at 1/12/2024  3:01 PM CST -----  Regarding: updated not of 1/5/2024  Mark Le  I updated the note for 1/5    D

## 2024-01-12 NOTE — PROGRESS NOTES
Physical Therapy Daily Treatment Note     Name: Tasha Hawley  Clinic Number: [id number]    Therapy Diagnosis:   Encounter Diagnoses   Name Primary?    Frequent falls Yes    Lymphedema of both lower extremities     Leg pain, bilateral      Physician: Marlon Wylie MD    Visit Date: 1/11/2024       Physician Orders: PT Eval and Treat - lymphedema  Medical Diagnosis from Referral: I89.0 (ICD-10-CM) - Lymphedema   Evaluation Date: 12/11/2023  Authorization Period Expiration: 12/20/2023  Plan of Care Expiration: 1/22/2024  Visit # / Visits authorized: 2/ 20     Time In: 1:45pm  Time Out: 3:00pm   Total Billable Time: 75  minutes     Precautions: Standard, blood thinners, Fall, and CHF    Subjective     Pt reports: She has daily significant pain in both her legs but it does not seem the bandages made it worse   She was compliant with home exercise program.  Response to previous treatment: eval only   Functional change: none since eval    Pain: unable to get numerical feedback, pt reports excruciating pain In both feet    Location: bilateral feet      Objective     Pt arrives with , in motor scooter, short stretch bandages still applied     1/10/2024            1/11/2024              Treatment:   Tasha received the following manual therapy techniques: were applied to the: BLEs for 75 minutes, including: MLD and short stretch compression bandaging         MULTILAYERED BANDAGING:  issued supplies and bandaged B LE with cotton stockinette,  2 cellona rolls foot to knee, 1-8cm and 2- 10cm rosidal rolls foot to knee, to leave intact 12-24 hrs as tolerated, discontinue with any problems, return rolled bandages next session. Wash and wear schedules confirmed.   Pt's legs cleaned with seaclense and dried with gauze  Eucerin cream applied to closed skin   Cavilon film applied to BLEs  Lateral L wound covered with xeroform and adaptic over top   Adaptic used on pt's L medial scabs   Rolled fluff gauze applied to pt's  B lower legs before stockinette to provide barrier       Home Exercises Provided and Patient Education Provided     Education provided:   Remove bandages if painful   PATIENT/FAMILY Education: bandaging wear schedule,  HEP,  Beginning of self massage,  Self or assisted bandaging, compression options, and Risk reduction    Written Home Exercises Provided: Patient instructed to cont prior HEP.  Exercises were reviewed and Tasha was able to demonstrate them prior to the end of the session.  Tasha demonstrated fair  understanding of the education provided.       Assessment     Pt presents to clinic with B legs bandages from foot to knee that were applied previously by therapy. Pt's skin is pink but not red or hot. Pt reports no extra pain with bandaging. Pt's small wounds cleaned and covered before applying short stretch bandages to reduce edema and aid in wound healing.  Will continue to progress       Tasha Is progressing well towards her goals.   Pt prognosis is Fair.     Pt will continue to benefit from skilled outpatient physical therapy to address the deficits listed in the problem list box on initial evaluation, provide pt/family education and to maximize pt's level of independence in the home and community environment.     Pt's spiritual, cultural and educational needs considered and pt agreeable to plan of care and goals.     Anticipated barriers to physical therapy: compliance, transportation, skin integrity     Goals:     Short Term Goals: (6 weeks)  1. Patient will show decreased girth in B LE by up to 1 cm to allow for LE symmetry, shoe and clothing choice, and ability to apply needed compression.  (progressing, not met)   2. Patient will demonstrate 100% knowledge of lymphedema precautions and signs of infection to allow for reduced lymphedema risk, infection risk, and/or exacerbation of condition.  (progressing, not met)  3. Patient or caregiver will perform self-bandaging techniques and/or wearing of  compression garments to allow for lymphatic drainage support, skin elasticity, and reduction in shape and size of limb. (progressing, not met)  4. Patient will perform self lymph drainage techniques to areas within reach to enhance lymphatic drainage and skin condition.  (progressing, not met)  5. Patient will tolerate daily activities with multilayered bandaging to allow for lymphatic and venous support.  (progressing, not met)     Long Term Goals: (12  weeks)  1. Patient will show decreased girth in B LE by up to 2 cm  to allow for LE symmetry, shoe and clothing choice, and ability to apply needed compression daily.  (progressing, not met)  2. Patient will show reduction in density to mild or less with improved contour of limb to allow for cosmesis, LE symmetry, infection risk reduction, and clothing and compression choice.   (progressing, not met)  3. Patient to vernell/doff compression garment with daily compliance to assist in lymphedema management, skin elasticity, and tissue density.  (progressing, not met)  4. Pt to show improved postural awareness and alignment.  (progressing, not met)  5. Pt to be I and compliant with HEP to allow for increased function in affected limb.   (progressing, not met)    Plan   Continue PT  2x   weekly for Complete Decongestive Therapy:  Manual lymphatic drainage, Multilayered short stretch bandaging, Pneumatic compression, Therapeutic exercises, Patient education as deemed necessary to achieve stated goals.      Jailene Zaman, PT

## 2024-01-12 NOTE — PROGRESS NOTES
CHIEF COMPLAINT:  SPT change      HISTORY OF PRESENTING ILLINESS:  Tasha Hawley is a 68 y.o. female who is presents with a history of neurogenic bladder with incomplete emptying managed with a suprapubic tube. [facility]se[facility] changes catheter every 3-4 weeks, had an apt today. Pt presented to clinic today for SPT change because she feels like she displaced it.      She was last injected with Botox by Dr. Mayo in 11/2023.   She states she has return of incontinence despite SP drainage.      Her injection was postponed due to need for cardiology evaluation. She has significant pulmonary and cardiovascular comorbidities.      Past medical, family, surgical and social history reviewed as documented in chart with pertinent positive medical, family, surgical and social history detailed in HPI.        REVIEW OF SYSTEMS:  Review of Systems   Constitutional:  Negative for chills and fever.   HENT:  Negative for congestion and sore throat.    Respiratory:  Negative for cough and shortness of breath.    Cardiovascular:  Negative for chest pain and palpitations.   Gastrointestinal:  Negative for nausea and vomiting.   Genitourinary:  Negative for flank pain and hematuria.        SPT present    Neurological:  Positive for weakness.         PATIENT HISTORY:    Past Medical History:   Diagnosis Date    Anxiety     Arthritis     Bilateral lower extremity edema     severe chronic    Carotid artery occlusion     Cataract     CHF (congestive heart failure)     Coronary artery disease     subtotalled LAD with collateral    Depression     Fever blister     Hard of hearing     Hypokalemia 01/09/2023    Hyponatremia 02/04/2022    Hypothyroid     Iron deficiency anemia     Lumbar radiculopathy     with chronic pain    Ocular migraine     Other emphysema 05/22/2023    Renal disorder     Sleep apnea     cpap       Past Surgical History:   Procedure Laterality Date    ADENOIDECTOMY      BACK SURGERY      x 12    CARDIAC CATHETERIZATION  2016     subtotalled LAD with right to left collaterals    CATARACT EXTRACTION W/  INTRAOCULAR LENS IMPLANT Left     Dr Coleman     CYSTOSCOPIC LITHOLAPAXY N/A 6/27/2019    Procedure: CYSTOLITHOLAPAXY;  Surgeon: Shireen Mayo MD;  Location: [facility] OR 2ND FLR;  Service: Urology;  Laterality: N/A;    CYSTOSCOPIC LITHOLAPAXY N/A 9/3/2019    Procedure: CYSTOLITHOLAPAXY;  Surgeon: Shireen Mayo MD;  Location: [facility] OR 2ND FLR;  Service: Urology;  Laterality: N/A;    CYSTOSCOPY N/A 7/13/2021    Procedure: CYSTOSCOPY;  Surgeon: Shireen Mayo MD;  Location: [facility] OR 1ST FLR;  Service: Urology;  Laterality: N/A;    CYSTOSCOPY  11/16/2021    Procedure: CYSTOSCOPY;  Surgeon: Shireen Mayo MD;  Location: [facility] OR 1ST FLR;  Service: Urology;;    CYSTOSCOPY  7/19/2022    Procedure: CYSTOSCOPY;  Surgeon: Shireen Mayo MD;  Location: [facility] OR 1ST FLR;  Service: Urology;;    CYSTOSCOPY WITH INJECTION OF PERIURETHRAL BULKING AGENT  7/19/2022    Procedure: CYSTOSCOPY, WITH PERIURETHRAL BULKING AGENT INJECTION-MACROPLASTIQUE;  Surgeon: Shireen Mayo MD;  Location: [facility] OR 1ST FLR;  Service: Urology;;    CYSTOSCOPY WITH INJECTION OF PERIURETHRAL BULKING AGENT N/A 3/28/2023    Procedure: CYSTOSCOPY, WITH PERIURETHRAL BULKING AGENT INJECTION;  Surgeon: Shireen Mayo MD;  Location: [facility] OR 1ST FLR;  Service: Urology;  Laterality: N/A;  [place]    CYSTOSCOPY WITH INJECTION OF PERIURETHRAL BULKING AGENT N/A 11/14/2023    Procedure: CYSTOSCOPY, WITH PERIURETHRAL BULKING AGENT INJECTION;  Surgeon: Shireen Mayo MD;  Location: [facility] OR 1ST FLR;  Service: Urology;  Laterality: N/A;    CYSTOSCOPY,WITH BOTULINUM TOXIN INJECTION N/A 12/13/2022    Procedure: CYSTOSCOPY,WITH BOTULINUM TOXIN INJECTION;  Surgeon: Shireen Mayo MD;  Location: [facility] OR 1ST FLR;  Service: Urology;  Laterality: N/A;  300 U    CYSTOSCOPY,WITH BOTULINUM TOXIN INJECTION N/A 3/28/2023    Procedure: CYSTOSCOPY,WITH BOTULINUM TOXIN INJECTION;  Surgeon:  Shireen Mayo MD;  Location: [facility] OR 1ST FLR;  Service: Urology;  Laterality: N/A;  45 MIN.    300 UNITS    ESOPHAGOGASTRODUODENOSCOPY N/A 5/23/2018    Procedure: ESOPHAGOGASTRODUODENOSCOPY (EGD);  Surgeon: Prince Vance MD;  Location: [facility] ENDO (4TH FLR);  Service: Endoscopy;  Laterality: N/A;  r/s 'd per Dr. Vance due to family emergency- ER    ESOPHAGOGASTRODUODENOSCOPY N/A 6/23/2023    Procedure: EGD (ESOPHAGOGASTRODUODENOSCOPY);  Surgeon: Juaquin Barry MD;  Location: [facility] ENDO (2ND FLR);  Service: Endoscopy;  Laterality: N/A;  Refferal: PRINCE VANCE  Order  tele enc 5/18/23  dx: history of a Nissen fundoplication  Additional Scheduling Information:  On home oxygen 2nd floor for airway protection also with a pain pump elevated BMI close to 40   Prep Gastroparesis   ins    HYSTERECTOMY  1975    endometriosis    INJECTION OF BOTULINUM TOXIN TYPE A  7/13/2021    Procedure: INJECTION, BOTULINUM TOXIN, 200units;  Surgeon: Shireen Mayo MD;  Location: [facility] OR 1ST FLR;  Service: Urology;;    INJECTION OF BOTULINUM TOXIN TYPE A  11/16/2021    Procedure: INJECTION, BOTULINUM TOXIN, 200units;  Surgeon: Shireen Mayo MD;  [facility];  Service: Urology;;    INJECTION OF BOTULINUM TOXIN TYPE A  7/19/2022    Procedure: INJECTION, BOTULINUM TOXIN, 300 units ;  Surgeon: Shireen Mayo MD;  [facility];  Service: Urology;;    INJECTION OF BOTULINUM TOXIN TYPE A N/A 11/14/2023    Procedure: INJECTION, BOTULINUM TOXIN, TYPE A;  Surgeon: Shireen Mayo MD;  [facility];  Service: Urology;  Laterality: N/A;    INSERTION, SUPRAPUBIC CATHETER N/A 12/13/2022    Procedure: INSERTION, SUPRAPUBIC CATHETER;  Surgeon: Shireen Mayo MD;  Location: [facility] OR 1ST FLR;  Service: Urology;  Laterality: N/A;  exchange    INSERTION, SUPRAPUBIC CATHETER N/A 11/14/2023    Procedure: INSERTION, SUPRAPUBIC CATHETER;  Surgeon: Shireen Mayo MD;  Location: [facility] OR [facility]  FLR;  Service: Urology;  Laterality: N/A;  EXCHANGE of s/p tube    pain pump placement      SQ Dilaudid Pump managed by Dr. Hillman, Pain Management    REMOVAL OF BONE SPUR OF FOOT Bilateral 9/16/2022    Procedure: EXCISION ARTHRITIC BONE, BILATERAL FOOT;  Surgeon: Adam Mcguire DPM;  Location: [facility];  Service: Podiatry;  Laterality: Bilateral;    REPLACEMENT OF BACLOFEN PUMP N/A 8/2/2023    Procedure: REPLACEMENT, BACLOFEN PUMP;  Surgeon: Smitha Condon MD;  Location: [facility] OR 2ND FLR;  Service: Neurosurgery;  Laterality: N/A;  Anes: Gen  Blood: Type & Screen  Pos: Left Lat  Intrathecal Pump  Bed: Reg Reversed  Rad: C-arm  Pump: 40 cc, [employer]    REPLACEMENT OF CATHETER N/A 10/31/2019    Procedure: REPLACEMENT, CATHETER-SUPRAPUBIC;  Surgeon: Shireen Mayo MD;  Location: [facility] OR 1ST FLR;  Service: Urology;  Laterality: N/A;    SPINAL CORD STIMULATOR REMOVAL      before [place]    SPINE SURGERY  5-13-13    CERVICAL FUSION    TONSILLECTOMY         Family History   Problem Relation Age of Onset    Cancer Mother 55        breast    Cancer Father         esophagus,had laryngectomy    Esophageal cancer Father     Parkinsonism Maternal Grandmother     Tremor Maternal Grandmother     No Known Problems Brother     No Known Problems Brother     Heart disease Maternal Uncle     Colon cancer Maternal Uncle         Less than 60    No Known Problems Sister     No Known Problems Maternal Aunt     Cirrhosis Paternal Aunt         ETOH    Liver disease Paternal Aunt         ETOH    Liver disease Paternal Uncle         ETOH    Cirrhosis Paternal Uncle         ETOH    No Known Problems Maternal Grandfather     No Known Problems Paternal Grandmother     No Known Problems Paternal Grandfather     Melanoma Neg Hx     Amblyopia Neg Hx     Blindness Neg Hx     Cataracts Neg Hx     Diabetes Neg Hx     Glaucoma Neg Hx     Hypertension Neg Hx     Macular degeneration Neg Hx     Retinal detachment Neg Hx     Strabismus Neg Hx   "   Stroke Neg Hx     Thyroid disease Neg Hx     Celiac disease Neg Hx     Colon polyps Neg Hx     Cystic fibrosis Neg Hx     Crohn's disease Neg Hx     Inflammatory bowel disease Neg Hx     Liver cancer Neg Hx     Rectal cancer Neg Hx     Stomach cancer Neg Hx     Ulcerative colitis Neg Hx     Lymphoma Neg Hx        Social History     Socioeconomic History    Marital status:    Tobacco Use    Smoking status: Never    Smokeless tobacco: Never   Substance and Sexual Activity    Alcohol use: Never    Drug use: Yes     Types: Marijuana     Comment: "medical marijuana gummies"     Social Determinants of Health     Financial Resource Strain: Low Risk  (9/11/2023)    Overall Financial Resource Strain (CARDIA)     Difficulty of Paying Living Expenses: Not hard at all   Food Insecurity: No Food Insecurity (9/11/2023)    Hunger Vital Sign     Worried About Running Out of Food in the Last Year: Never true     Ran Out of Food in the Last Year: Never true   Transportation Needs: No Transportation Needs (9/11/2023)    PRAPARE - Transportation     Lack of Transportation (Medical): No     Lack of Transportation (Non-Medical): No   Physical Activity: Inactive (9/11/2023)    Exercise Vital Sign     Days of Exercise per Week: 0 days     Minutes of Exercise per Session: 0 min   Stress: Stress Concern Present (9/11/2023)    [employer] - Occupational Stress Questionnaire     Feeling of Stress : To some extent   Social Connections: Moderately Isolated (9/11/2023)    Social Connection and Isolation Panel [NHANES]     Frequency of Communication with Friends and Family: More than three times a week     Frequency of Social Gatherings with Friends and Family: Three times a week     Attends [employer] Services: Never     Active Member of Clubs or Organizations: No     Attends Club or Organization Meetings: Never     Marital Status:    Housing Stability: Low Risk  (9/11/2023)    Housing Stability Vital " Sign     Unable to Pay for Housing in the Last Year: No     Number of Places Lived in the Last Year: 1     Unstable Housing in the Last Year: No       Allergies:  (d)-limonene flavor; Benadryl [diphenhydramine hcl]; Fentanyl; Imitrex [sumatriptan succinate]; Oxycodone-acetaminophen; Sulfamethoxazole-trimethoprim; Topiramate; Vancomycin; Butorphanol tartrate; Evening primrose (oenothera biennis); Latex; Pregabalin; Propoxyphene n-acetaminophen; Sumatriptan; White petrolatum-zinc; Zinc acetate; Zinc oxide-white petrolatum; Latex, natural rubber; and Phenytoin    Medications:    Current Outpatient Medications:     aspirin (ECOTRIN) 81 MG EC tablet, Take 1 tablet (81 mg total) by mouth once daily., Disp: 90 tablet, Rfl: 3    atorvastatin (LIPITOR) 80 MG tablet, Take 1 tablet (80 mg total) by mouth once daily., Disp: 90 tablet, Rfl: 3    butalbital-acetaminophen-caffeine -40 mg (FIORICET, ESGIC) -40 mg per tablet, Take 1 tablet by mouth daily as needed., Disp: , Rfl:     ciprofloxacin HCl (CIPRO) 500 MG tablet, Take 1 tablet by mouth 2 (two) times daily., Disp: , Rfl:     cyanocobalamin, vitamin B-12, 5,000 mcg Subl, Place 1 tablet under the tongue once daily., Disp: , Rfl:     darifenacin (ENABLEX) 7.5 MG Tb24, TAKE ONE TABLET BY MOUTH EVERY DAY, Disp: 30 tablet, Rfl: 11    diclofenac sodium (VOLTAREN ARTHRITIS PAIN) 1 % Gel, Apply 4 g topically 4 (four) times daily., Disp: 150 g, Rfl: 5    docusate sodium (COLACE) 100 MG capsule, Take 200 mg by mouth every evening., Disp: , Rfl:     DULoxetine (CYMBALTA) 60 MG capsule, Take 1 capsule (60 mg total) by mouth once daily., Disp: 30 capsule, Rfl: 11    fludrocortisone (FLORINEF) 0.1 mg Tab, Take a half-tablet (50 mcg) by mouth once daily, Disp: 15 tablet, Rfl: 5    fluticasone propionate (FLONASE) 50 mcg/actuation nasal spray, 2 sprays (100 mcg total) by Each Nostril route once daily., Disp: 16 g, Rfl: 0    furosemide (LASIX) 40 MG tablet, Take 2 tablets (80 mg  total) by mouth 2 (two) times a day., Disp: 360 tablet, Rfl: 3    gabapentin (NEURONTIN) 100 MG capsule, Take 60 mg by mouth 3 (three) times daily., Disp: , Rfl:     HYDROcodone-acetaminophen (NORCO)  mg per tablet, Take 1 tablet by mouth every 6 (six) hours as needed., Disp: 30 tablet, Rfl: 0    hydrocortisone (CORTEF) 10 MG Tab, Take 1 tablet (10 mg total) by mouth every evening., Disp: 30 tablet, Rfl: 5    hydrOXYzine (ATARAX) 50 MG tablet, Take 1 tablet (50 mg total) by mouth every 4 (four) hours as needed for Anxiety., Disp: 30 tablet, Rfl: 0    intrathecal pain pump compound, Hydromorphone (7.5 mg/mL) infusion at 8.59 mg/day (0.3578 mg/hr) out of a total reservoir volume of 40 mL Pump filled monthly, Disp: , Rfl:     ketorolac (TORADOL) 10 mg tablet, Take 10 mg by mouth daily as needed., Disp: , Rfl:     levothyroxine (SYNTHROID) 150 MCG tablet, Take 1 tablet (150 mcg total) by mouth before breakfast., Disp: 90 tablet, Rfl: 3    LIDOcaine (LIDODERM) 5 %, Place 1 patch onto the skin once daily. Remove & Discard patch within 12 hours or as directed by MD, Disp: , Rfl: 0    liothyronine (CYTOMEL) 5 MCG Tab, Take 1 tablet (5 mcg total) by mouth once daily., Disp: 30 tablet, Rfl: 11    nitroGLYCERIN (NITROSTAT) 0.4 MG SL tablet, Place 0.4 mg under the tongue every 5 (five) minutes as needed for Chest pain., Disp: , Rfl:     nystatin (MYCOSTATIN) powder, Apply topically 4 (four) times daily., Disp: 60 g, Rfl: 0    ondansetron (ZOFRAN-ODT) 4 MG TbDL, Take 4 mg by mouth every 4 to 6 hours as needed., Disp: , Rfl:     pantoprazole (PROTONIX) 40 MG tablet, Take 1 tablet (40 mg total) by mouth once daily., Disp: 90 tablet, Rfl: 3    potassium chloride (MICRO-K) 10 MEQ CpSR, Take 2 capsules (20 mEq total) by mouth once daily., Disp: 60 capsule, Rfl: 11    promethazine (PHENERGAN) 25 MG tablet, Take 25 mg by mouth every 6 (six) hours as needed for Nausea., Disp: , Rfl:     QUEtiapine (SEROQUEL) 100 MG Tab, TAKE 2  TABLETS (200 MG) BY MOUTH NIGHTLY, Disp: 180 tablet, Rfl: 3    senna (SENNA LAX) 8.6 mg tablet, Take 2 tablets by mouth 2 (two) times daily., Disp: , Rfl:     tiotropium bromide (SPIRIVA RESPIMAT) 2.5 mcg/actuation inhaler, Inhale 2 puffs into the lungs once daily. Controller, Disp: 4 g, Rfl: 1    traMADoL (ULTRAM) 50 mg tablet, Take 1 tablet (50 mg total) by mouth every 4 (four) hours as needed for Pain., Disp: 30 tablet, Rfl: 0    traZODone (DESYREL) 300 MG tablet, Take 1 tablet (300 mg total) by mouth every evening., Disp: 90 tablet, Rfl: 3    PHYSICAL EXAMINATION:  Physical Exam  Constitutional:       Appearance: She is obese.   HENT:      Head: Normocephalic.      Right Ear: External ear normal.      Left Ear: External ear normal.      Nose: Nose normal.      Mouth/Throat:      Mouth: Mucous membranes are moist.   Pulmonary:      Effort: Pulmonary effort is normal. No respiratory distress.   Abdominal:      Comments: SPT patent, draining well, no erythema, no granulation tissue.     Musculoskeletal:      Comments: BLE swelling   Skin:     General: Skin is warm and dry.   Neurological:      General: No focal deficit present.      Mental Status: She is alert.   Psychiatric:         Mood and Affect: Mood normal.             Lab Results   Component Value Date    CREATININE 1.0 2024    EGFRNORACEVR >60.0 2024             IMPRESSION:  Encounter Diagnoses   Name Primary?    Neurogenic bladder Yes    Suprapubic catheter     Mixed stress and urge urinary incontinence          Assessment:       1. Neurogenic bladder    2. Suprapubic catheter    3. Mixed stress and urge urinary incontinence        Plan:   Name,  and allergies verified  I removed existing SPT without any difficulty and properly disposed, dirty gloves removed, hand hygiene performed.   Stoma cleaned with betadine.  I easily placed 20 fr SPT using sterile technique. SPT flushed with 20 ml sterile water to confirm placement.  Clear yellow urine  received and balloon inflated by with 10 ml sterile water.   Tolerated well.  Site cleaned.   Daily skin care and suprapubic catheter care discussed.  Educational materials given.  Increase water intake to help with sediment.   Voiced understanding.       HH to continue SPT management    I spent 30 minutes with the patient of which more than half was spent in direct consultation with the patient in regards to our treatment and plan.  We addressed the office findings and recent labs.   Education and recommendations of today's plan of care including home remedies and needed follow up with PCP.   We discussed the chief complaint; reviewed the LUTS and the possible contributory factors.

## 2024-01-12 NOTE — PROGRESS NOTES
Physical Therapy Daily Treatment Note     Name: Tasha Hawley  Clinic Number: [id number]    Therapy Diagnosis:   Encounter Diagnoses   Name Primary?    Frequent falls Yes    Lymphedema of both lower extremities     Leg pain, bilateral      Physician: Marlon Wylie MD    Visit Date: 1/10/2024       Physician Orders: PT Eval and Treat - lymphedema  Medical Diagnosis from Referral: I89.0 (ICD-10-CM) - Lymphedema   Evaluation Date: 12/11/2023  Authorization Period Expiration: 12/20/2023  Plan of Care Expiration: 1/22/2024  Visit # / Visits authorized: 1/ 20     Time In: 2:35pm  Time Out: 3:50pm  Total Billable Time: 75  minutes     Precautions: Standard, blood thinners, Fall, and CHF    Subjective     Pt reports: She has daily significant pain in both her legs. Her neighbor has been wrapping her legs, she cannot remember when they were last applied   She was compliant with home exercise program.  Response to previous treatment: eval only   Functional change: none since eval    Pain: unable to get numerical feedback, pt reports excruciating pain In both feet    Location: bilateral feet      Objective     Pt arrives with , in motor scooter, Kob bandages with several layers of padding dressing under. Cotton padding layer stuck to B feet and scabs on pt's L lateral leg               Treatment:   Tasha received the following manual therapy techniques: were applied to the: BLEs for 75 minutes, including: MLD and short stretch compression bandaging         MULTILAYERED BANDAGING:  issued supplies and bandaged B LE with cotton stockinette,  2 cellona rolls foot to knee, 1-8cm and 2- 10cm rosidal rolls foot to knee, to leave intact 12-24 hrs as tolerated, discontinue with any problems, return rolled bandages next session. Wash and wear schedules confirmed.   Seaclense soak used for removing cotton padding from adhering to pt's skin   Pt's legs cleaned with seaclense and dried with gauze  Abdominal pads used to  pad pt's RLE  Adaptic used on pt's medial scabs, aquacell sliver used on pt's lateral wound with gauze covering both       Home Exercises Provided and Patient Education Provided     Education provided:   Remove bandages if painful   PATIENT/FAMILY Education: bandaging wear schedule,  HEP,  Beginning of self massage,  Self or assisted bandaging, compression options, and Risk reduction    Written Home Exercises Provided: Patient instructed to cont prior HEP.  Exercises were reviewed and Tasha was able to demonstrate them prior to the end of the session.  Tasha demonstrated fair  understanding of the education provided.       Assessment     Pt presents to clinic with B legs bandages from foot to knee. Pt reports her neighbor has been bandaging her legs. Pt's legs were bandaged with Koban and several layers of padding. Cotton padding was on first layer of bandages and had stuck to sores on her skin. Even with soaking dressing in sea cleanse, skin lifting did occur, particularly on L lateral lower leg. Pt's legs were cleaned with sea cleanse and compression bandaging was applied. Patient's L leg sores were applied with adaptic touch and on L lateral leg sore aquacell was applied. Pt's RLE was applied with combine pads before regular bandages. Pt will be returning tomorrow and so was educated to keep bandages on until next visit. Will continue to progress       Tasha Is progressing well towards her goals.   Pt prognosis is Fair.     Pt will continue to benefit from skilled outpatient physical therapy to address the deficits listed in the problem list box on initial evaluation, provide pt/family education and to maximize pt's level of independence in the home and community environment.     Pt's spiritual, cultural and educational needs considered and pt agreeable to plan of care and goals.     Anticipated barriers to physical therapy: compliance, transportation, skin integrity     Goals:     Short Term Goals: (6 weeks)  1.  Patient will show decreased girth in B LE by up to 1 cm to allow for LE symmetry, shoe and clothing choice, and ability to apply needed compression.  (progressing, not met)   2. Patient will demonstrate 100% knowledge of lymphedema precautions and signs of infection to allow for reduced lymphedema risk, infection risk, and/or exacerbation of condition.  (progressing, not met)  3. Patient or caregiver will perform self-bandaging techniques and/or wearing of compression garments to allow for lymphatic drainage support, skin elasticity, and reduction in shape and size of limb. (progressing, not met)  4. Patient will perform self lymph drainage techniques to areas within reach to enhance lymphatic drainage and skin condition.  (progressing, not met)  5. Patient will tolerate daily activities with multilayered bandaging to allow for lymphatic and venous support.  (progressing, not met)     Long Term Goals: (12  weeks)  1. Patient will show decreased girth in B LE by up to 2 cm  to allow for LE symmetry, shoe and clothing choice, and ability to apply needed compression daily.  (progressing, not met)  2. Patient will show reduction in density to mild or less with improved contour of limb to allow for cosmesis, LE symmetry, infection risk reduction, and clothing and compression choice.   (progressing, not met)  3. Patient to vernell/doff compression garment with daily compliance to assist in lymphedema management, skin elasticity, and tissue density.  (progressing, not met)  4. Pt to show improved postural awareness and alignment.  (progressing, not met)  5. Pt to be I and compliant with HEP to allow for increased function in affected limb.   (progressing, not met)    Plan   Continue PT  2x   weekly for Complete Decongestive Therapy:  Manual lymphatic drainage, Multilayered short stretch bandaging, Pneumatic compression, Therapeutic exercises, Patient education as deemed necessary to achieve stated goals.      Jailene Zaman,  PT

## 2024-01-16 ENCOUNTER — TELEPHONE (OUTPATIENT)
Dept: GASTROENTEROLOGY | Facility: CLINIC | Age: 69
End: 2024-01-16
Payer: MEDICARE

## 2024-01-17 ENCOUNTER — OFFICE VISIT (OUTPATIENT)
Dept: PODIATRY | Facility: CLINIC | Age: 69
End: 2024-01-17
Payer: MEDICARE

## 2024-01-17 VITALS
DIASTOLIC BLOOD PRESSURE: 63 MMHG | TEMPERATURE: 98 F | HEART RATE: 67 BPM | WEIGHT: 230 LBS | HEIGHT: 67 IN | RESPIRATION RATE: 14 BRPM | SYSTOLIC BLOOD PRESSURE: 106 MMHG | OXYGEN SATURATION: 91 % | BODY MASS INDEX: 36.1 KG/M2

## 2024-01-17 DIAGNOSIS — M79.671 CHRONIC PAIN OF BOTH FEET: ICD-10-CM

## 2024-01-17 DIAGNOSIS — R52 INTRACTABLE PAIN: Primary | ICD-10-CM

## 2024-01-17 DIAGNOSIS — R21 RASH: ICD-10-CM

## 2024-01-17 DIAGNOSIS — T14.8XXA BLISTER OF SKIN: ICD-10-CM

## 2024-01-17 DIAGNOSIS — G89.29 CHRONIC PAIN OF BOTH FEET: ICD-10-CM

## 2024-01-17 DIAGNOSIS — M79.672 CHRONIC PAIN OF BOTH FEET: ICD-10-CM

## 2024-01-17 DIAGNOSIS — R53.81 DEBILITY: ICD-10-CM

## 2024-01-17 DIAGNOSIS — S92.901G CLOSED MULTIPLE FRACTURES OF RIGHT FOOT WITH DELAYED HEALING, SUBSEQUENT ENCOUNTER: ICD-10-CM

## 2024-01-17 DIAGNOSIS — M79.671 PAIN IN RIGHT FOOT: ICD-10-CM

## 2024-01-17 DIAGNOSIS — I89.0 LYMPHEDEMA OF BOTH LOWER EXTREMITIES: ICD-10-CM

## 2024-01-17 DIAGNOSIS — Z91.81 AT HIGH RISK FOR FALLS: ICD-10-CM

## 2024-01-17 DIAGNOSIS — R22.43 LOCALIZED SWELLING OF BOTH LOWER LEGS: ICD-10-CM

## 2024-01-17 PROCEDURE — 99999 PR PBB SHADOW E&M-EST. PATIENT-LVL III: CPT | Mod: PBBFAC,,, | Performed by: STUDENT IN AN ORGANIZED HEALTH CARE EDUCATION/TRAINING PROGRAM

## 2024-01-17 PROCEDURE — 3288F FALL RISK ASSESSMENT DOCD: CPT | Mod: CPTII,S$GLB,, | Performed by: STUDENT IN AN ORGANIZED HEALTH CARE EDUCATION/TRAINING PROGRAM

## 2024-01-17 PROCEDURE — 3078F DIAST BP <80 MM HG: CPT | Mod: CPTII,S$GLB,, | Performed by: STUDENT IN AN ORGANIZED HEALTH CARE EDUCATION/TRAINING PROGRAM

## 2024-01-17 PROCEDURE — 3008F BODY MASS INDEX DOCD: CPT | Mod: CPTII,S$GLB,, | Performed by: STUDENT IN AN ORGANIZED HEALTH CARE EDUCATION/TRAINING PROGRAM

## 2024-01-17 PROCEDURE — 1125F AMNT PAIN NOTED PAIN PRSNT: CPT | Mod: CPTII,S$GLB,, | Performed by: STUDENT IN AN ORGANIZED HEALTH CARE EDUCATION/TRAINING PROGRAM

## 2024-01-17 PROCEDURE — 99215 OFFICE O/P EST HI 40 MIN: CPT | Mod: 25,S$GLB,, | Performed by: STUDENT IN AN ORGANIZED HEALTH CARE EDUCATION/TRAINING PROGRAM

## 2024-01-17 PROCEDURE — 1100F PTFALLS ASSESS-DOCD GE2>/YR: CPT | Mod: CPTII,S$GLB,, | Performed by: STUDENT IN AN ORGANIZED HEALTH CARE EDUCATION/TRAINING PROGRAM

## 2024-01-17 PROCEDURE — 1159F MED LIST DOCD IN RCRD: CPT | Mod: CPTII,S$GLB,, | Performed by: STUDENT IN AN ORGANIZED HEALTH CARE EDUCATION/TRAINING PROGRAM

## 2024-01-17 PROCEDURE — 3074F SYST BP LT 130 MM HG: CPT | Mod: CPTII,S$GLB,, | Performed by: STUDENT IN AN ORGANIZED HEALTH CARE EDUCATION/TRAINING PROGRAM

## 2024-01-17 PROCEDURE — 29580 STRAPPING UNNA BOOT: CPT | Mod: 50,S$GLB,, | Performed by: STUDENT IN AN ORGANIZED HEALTH CARE EDUCATION/TRAINING PROGRAM

## 2024-01-17 RX ORDER — DULOXETIN HYDROCHLORIDE 60 MG/1
60 CAPSULE, DELAYED RELEASE ORAL 2 TIMES DAILY
Qty: 60 CAPSULE | Refills: 11 | Status: SHIPPED | OUTPATIENT
Start: 2024-01-17 | End: 2024-05-02 | Stop reason: SDUPTHER

## 2024-01-17 RX ORDER — TRIAMCINOLONE ACETONIDE 1 MG/G
CREAM TOPICAL 2 TIMES DAILY
Qty: 80 G | Refills: 11 | Status: SHIPPED | OUTPATIENT
Start: 2024-01-17 | End: 2024-03-01 | Stop reason: ALTCHOICE

## 2024-01-17 RX ORDER — TRAMADOL HYDROCHLORIDE 50 MG/1
50 TABLET ORAL EVERY 4 HOURS PRN
Qty: 30 TABLET | Refills: 0 | Status: SHIPPED | OUTPATIENT
Start: 2024-01-17 | End: 2024-04-12 | Stop reason: SDUPTHER

## 2024-01-17 RX ORDER — HYDROCODONE BITARTRATE AND ACETAMINOPHEN 10; 325 MG/1; MG/1
1 TABLET ORAL EVERY 6 HOURS PRN
Qty: 30 TABLET | Refills: 0 | Status: SHIPPED | OUTPATIENT
Start: 2024-01-17 | End: 2024-04-12 | Stop reason: SDUPTHER

## 2024-01-17 NOTE — PROGRESS NOTES
Subjective:     Patient    Tasha Hawley is a 68 y.o. female.    Problem    09/21/23: Previously followed outpatient by Dr Mcguire. 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 Has ongoing right 1st toe pain, poorly controlled on opioids. Uses surgical shoes. Also has home health wrapping her legs every other day for lymphedema, does not have a physician following her for lymphedema.     10/02/23: Stopped amitriptyline due to side effects. Requesting refill on tramadol. Has followup with pain management in 2 weeks.     12/28/23: Returns for severe bilateral lower leg and foot pain, has been having falls lately and cannot walk. Now starting lymphedema therapy and so the insurance will not also cover HH dressing changes.     01/17/24: Lymphedema therapy changing dressings every week. On duloxetine 60 mg/day, Norco 10, tramadol with ongoing severe pain. More alert than at last visit. Patient continues to have multiple falls per week,  reports difficulty managing her healthcare needs at home by himself.     History    History obtained from patient and review of medical records.     Past Medical History:   Diagnosis Date    Anxiety     Arthritis     Bilateral lower extremity edema     severe chronic    Carotid artery occlusion     Cataract     CHF (congestive heart failure)     Coronary artery disease     subtotalled LAD with collateral    Depression     Fever blister     Hard of hearing     Hypokalemia 01/09/2023    Hyponatremia 02/04/2022    Hypothyroid     Iron deficiency anemia     Lumbar radiculopathy     with chronic pain    Ocular migraine     Other emphysema 05/22/2023    Renal disorder     Sleep apnea     cpap       Past Surgical History:   Procedure Laterality Date    ADENOIDECTOMY      BACK SURGERY      x 12    CARDIAC CATHETERIZATION  2016    subtotalled LAD with right to left collaterals     CATARACT EXTRACTION W/  INTRAOCULAR LENS IMPLANT Left     Dr Coleman     CYSTOSCOPIC LITHOLAPAXY N/A 6/27/2019    Procedure: CYSTOLITHOLAPAXY;  Surgeon: Shireen Mayo MD;  Location: [facility] OR 2ND FLR;  Service: Urology;  Laterality: N/A;    CYSTOSCOPIC LITHOLAPAXY N/A 9/3/2019    Procedure: CYSTOLITHOLAPAXY;  Surgeon: Shireen Mayo MD;  [facility]: [facility] 2ND FLR;  Service: Urology;  Laterality: N/A;    CYSTOSCOPY N/A 7/13/2021    Procedure: CYSTOSCOPY;  Surgeon: Shireen Mayo MD;  [facility] 1ST FLR;  Service: Urology;  Laterality: N/A;    CYSTOSCOPY  11/16/2021    Procedure: CYSTOSCOPY;  Surgeon: Shireen Mayo MD;  [facility] 1ST FLR;  Service: Urology;;    CYSTOSCOPY  7/19/2022    Procedure: CYSTOSCOPY;  Surgeon: Shireen Mayo MD;  Location: [facility] OR 1ST FLR;  Service: Urology;;    CYSTOSCOPY WITH INJECTION OF PERIURETHRAL BULKING AGENT  7/19/2022    Procedure: CYSTOSCOPY, WITH PERIURETHRAL BULKING AGENT INJECTION-MACROPLASTIQUE;  Surgeon: Shireen Mayo MD;  Location: [facility] OR 1ST FLR;  Service: Urology;;    CYSTOSCOPY WITH INJECTION OF PERIURETHRAL BULKING AGENT N/A 3/28/2023    Procedure: CYSTOSCOPY, WITH PERIURETHRAL BULKING AGENT INJECTION;  Surgeon: Shireen Mayo MD;  Location: [facility] OR 1ST FLR;  Service: Urology;  Laterality: N/A;  [place]    CYSTOSCOPY WITH INJECTION OF PERIURETHRAL BULKING AGENT N/A 11/14/2023    Procedure: CYSTOSCOPY, WITH PERIURETHRAL BULKING AGENT INJECTION;  Surgeon: Shireen Mayo MD;  [facility] 1ST FLR;  Service: Urology;  Laterality: N/A;    CYSTOSCOPY,WITH BOTULINUM TOXIN INJECTION N/A 12/13/2022    Procedure: CYSTOSCOPY,WITH BOTULINUM TOXIN INJECTION;  Surgeon: Shireen Mayo MD;  [facility] 1ST FLR;  Service: Urology;  Laterality: N/A;  300 U    CYSTOSCOPY,WITH BOTULINUM TOXIN INJECTION N/A 3/28/2023    Procedure: CYSTOSCOPY,WITH BOTULINUM TOXIN INJECTION;  Surgeon: Shireen Mayo MD;  [facility];   Service: Urology;  Laterality: N/A;  45 MIN.    300 UNITS    ESOPHAGOGASTRODUODENOSCOPY N/A 5/23/2018    Procedure: ESOPHAGOGASTRODUODENOSCOPY (EGD);  Surgeon: Prince Vance MD;  Location: [facility] ENDO (4TH FLR);  Service: Endoscopy;  Laterality: N/A;  r/s 'd per Dr. Vance due to family emergency- ER    ESOPHAGOGASTRODUODENOSCOPY N/A 6/23/2023    Procedure: EGD (ESOPHAGOGASTRODUODENOSCOPY);  Surgeon: Juaquin Barry MD;  Location: [facility] ENDO (2ND FLR);  Service: Endoscopy;  Laterality: N/A;  Refferal: PRINCE VANCE  Order  tele enc 5/18/23  dx: history of a Nissen fundoplication  Additional Scheduling Information:  On home oxygen 2nd floor for airway protection also with a pain pump elevated BMI close to 40   Prep Gastroparesis   ins    HYSTERECTOMY  1975    endometriosis    INJECTION OF BOTULINUM TOXIN TYPE A  7/13/2021    Procedure: INJECTION, BOTULINUM TOXIN, 200units;  Surgeon: Shireen Mayo MD;  Location: [facility] OR 1ST FLR;  Service: Urology;;    INJECTION OF BOTULINUM TOXIN TYPE A  11/16/2021    Procedure: INJECTION, BOTULINUM TOXIN, 200units;  Surgeon: Shireen Mayo MD;  [facility];  Service: Urology;;    INJECTION OF BOTULINUM TOXIN TYPE A  7/19/2022    Procedure: INJECTION, BOTULINUM TOXIN, 300 units ;  Surgeon: Shireen Mayo MD;  [facility];  Service: Urology;;    INJECTION OF BOTULINUM TOXIN TYPE A N/A 11/14/2023    Procedure: INJECTION, BOTULINUM TOXIN, TYPE A;  Surgeon: Shireen Mayo MD;  [facility];  Service: Urology;  Laterality: N/A;    INSERTION, SUPRAPUBIC CATHETER N/A 12/13/2022    Procedure: INSERTION, SUPRAPUBIC CATHETER;  Surgeon: Shireen Mayo MD;  Location: [facility] OR 1ST FLR;  Service: Urology;  Laterality: N/A;  exchange    INSERTION, SUPRAPUBIC CATHETER N/A 11/14/2023    Procedure: INSERTION, SUPRAPUBIC CATHETER;  Surgeon: Shireen Mayo MD;  Location: [facility] OR [address] [place] [address];  Service: Urology;  Laterality: N/A;  EXCHANGE  of s/p tube    pain pump placement      SQ Dilaudid Pump managed by Dr. Hillman, Pain Management    REMOVAL OF BONE SPUR OF FOOT Bilateral 2022    Procedure: EXCISION ARTHRITIC BONE, BILATERAL FOOT;  Surgeon: Adam Mcguire DPM;  Location: [facility];  Service: Podiatry;  Laterality: Bilateral;    REPLACEMENT OF BACLOFEN PUMP N/A 2023    Procedure: REPLACEMENT, BACLOFEN PUMP;  Surgeon: Smitha Condon MD;  Location: [facility] OR 2ND FLR;  Service: Neurosurgery;  Laterality: N/A;  Anes: Gen  Blood: Type & Screen  Pos: Left Lat  Intrathecal Pump  Bed: Reg Reversed  Rad: C-arm  Pump: 40 cc, [employer]tronics    REPLACEMENT OF CATHETER N/A 10/31/2019    Procedure: REPLACEMENT, CATHETER-SUPRAPUBIC;  Surgeon: Shireen Mayo MD;  Location: [facility] OR 1ST FLR;  Service: Urology;  Laterality: N/A;    SPINAL CORD STIMULATOR REMOVAL      before [place]    SPINE SURGERY  13    CERVICAL FUSION    TONSILLECTOMY          Objective:     Vitals  Wt Readings from Last 1 Encounters:   24 104.3 kg (230 lb)     Temp Readings from Last 1 Encounters:   24 97.6 °F (36.4 °C)     BP Readings from Last 1 Encounters:   24 106/63     Pulse Readings from Last 1 Encounters:   24 67       Dermatological Exam    Skin:  Pedal hair growth diminished and Pedal skin thin and shiny on right; irritation and blistering of lower leg skin  Pedal hair growth diminished and Pedal skin thin and shiny on left; irritation and blistering of lower leg skin       Nails:  10 nail(s) thickened and 10 nail(s) discolored    Vascular Exam    Arteries:  Posterior tibial artery nonpalpable on right  Dorsalis pedis artery palpable on right  Posterior tibial artery nonpalpable on left  Dorsalis pedis artery palpable on left    Veins:  Telangectasia on right  Telangectasia on left    Swellin+ nonpitting on right  2+ nonpitting on left    Neurological Exam    Independence touch test:  6/6 sites sensed, light touch intact     Musculoskeletal  Exam    Footwear:  Surgical shoe on right  Surgical shoe on left    Gait Exam:   Ambulatory Status: Ambulatory  Gait: Normal  Assistive Devices: None    Foot Progression Angle:  Normal on right  Normal on left     Right Lower Extremity Additional Findings:  Diffuse severe pain from toes to knee  Right foot and ankle function, strength, and range of motion unremarkable except as noted above.     Left Lower Extremity Additional Findings:  Diffuse severe pain from toes to knee  Left foot and ankle function, strength, and range of motion unremarkable except as noted above.    Imaging and Other Tests    Imagin23 B foot and ankle X rays: bilateral flatfoot deformity, osteopenia, right 1st toe and 2nd-5th distal metatarsal fractures    Independently reviewed and interpreted imaging, findings are as follows: N/A     Assessment:     Encounter Diagnoses   Name Primary?    Intractable pain Yes    Chronic pain of both feet     Closed multiple fractures of right foot with delayed healing, subsequent encounter     Pain in right foot     Rash     Debility     At high risk for falls             Plan:     I counseled the patient on her conditions, their implications and medical management.    Intractable pain, both lower legs and feet: chronic severely exacerbated  -Severe intractable pain of both lower extremities, difficult to control despite multiple medications, has resulted in multiple ED visits, contributes to falling on a regular basis; high risk for hospitalization or placement into facility.   -Increase duloxetine to 120 mg/day.   -Continue Norco and tramadol PRN, wean off as tolerated.     -Previously did not tolerate pregabalin or amitriptyline, may consider gabapentin.     Right foot fractures: subacute    -Appreciate that patient cannot safely remain NWB RLE.  -Continue protected WB RLE in surgical shoe for now.      Lymphedema, blistering, cellulitis, rash: chronic stable   -Topical triamcinolone.    -Bilateral unna boot multilayer compression dressings applied to lower legs.   -Continue lymphedema therapy, interval dressing changes by lymphedema PT.     Debility, high fall risk, recent fractures causing difficulty ambulating: worsening  -Discussed potential benefits of placement into SNF vs [facility] nursing facility, if not cost prohibitive. Patient and  will consider.       Return to clinic in 2-3 weeks for nerve pain, dressing change, call sooner PRN.

## 2024-01-20 ENCOUNTER — DOCUMENT SCAN (OUTPATIENT)
Dept: HOME HEALTH SERVICES | Facility: HOSPITAL | Age: 69
End: 2024-01-20
Payer: MEDICARE

## 2024-01-25 ENCOUNTER — TELEPHONE (OUTPATIENT)
Dept: UROLOGY | Facility: CLINIC | Age: 69
End: 2024-01-25
Payer: MEDICARE

## 2024-01-25 NOTE — TELEPHONE ENCOUNTER
----- Message from Prema Payton sent at 1/25/2024 12:44 PM CST -----  Regarding: Botox reaction  Contact: [phone]  Calling to speak with provider or nurse regarding Botox she received and that pt states she is leaking really bad from her urethra. Please call and discuss  with patient as soon as possible.

## 2024-01-26 ENCOUNTER — TELEPHONE (OUTPATIENT)
Dept: INTERNAL MEDICINE | Facility: CLINIC | Age: 69
End: 2024-01-26
Payer: MEDICARE

## 2024-01-26 NOTE — TELEPHONE ENCOUNTER
----- Message from Nancy Lama sent at 1/26/2024 11:08 AM CST -----  Contact: Self/ [phone]  [email or web address]     Patient is calling for Medical Advice regarding:pt not feeling well     How long has patient had these symptoms: a couple of days     Pharmacy name and phone#:    Would like response via [employer]:  Would like a return call     Comments:pt stated that she had Shingles and is still not feeling well

## 2024-01-26 NOTE — TELEPHONE ENCOUNTER
"Red itchy rash on torso that wraps around to her back. Patient states its very itchy but doesn't really hurt. She also stated it looks kind of looks like " bed sores". There hasn't been a  nurse check on her in awhile she states. Rash started a few days ago and progressively has gotten more annoying.   "

## 2024-01-26 NOTE — TELEPHONE ENCOUNTER
If she has home health, please ask the nurse to take a look and report to me.      Otherwise, I need to see the rash in order to make a decision.  I don't know if they can take a picture and send it to me?    D

## 2024-01-26 NOTE — TELEPHONE ENCOUNTER
Attempted to contact patient to upload a photo to send to Dr. Hodges. If not patient will need to be seen for an urgent care appointment.     Unable to LVM mailbox full.

## 2024-01-31 ENCOUNTER — TELEPHONE (OUTPATIENT)
Dept: UROLOGY | Facility: CLINIC | Age: 69
End: 2024-01-31
Payer: MEDICARE

## 2024-01-31 DIAGNOSIS — N31.9 NEUROGENIC BLADDER: Primary | ICD-10-CM

## 2024-01-31 DIAGNOSIS — N32.81 DETRUSOR OVERACTIVITY: ICD-10-CM

## 2024-02-02 ENCOUNTER — TELEPHONE (OUTPATIENT)
Dept: UROLOGY | Facility: CLINIC | Age: 69
End: 2024-02-02
Payer: MEDICARE

## 2024-02-02 NOTE — TELEPHONE ENCOUNTER
----- Message from Catalina Rasta sent at 2/2/2024  9:04 AM CST -----  Regarding: pt advice- 4th Attempt  Contact: pt [phone]  Pt calling to advise she has a severe bladder and yeast infection. Pt would like something called into her preferred pharmacy.  Pls call       [facility] [place] - [place] - [address]  [address]  [place] [address]  Phone: [phone] Fax: [phone]      Shireen Mayo MD(URO)

## 2024-02-02 NOTE — TELEPHONE ENCOUNTER
----- Message from Naty Gutierrez sent at 2/1/2024  5:24 PM CST -----  Type:  Patient Returning Call   Who Called: pt   Who Left Message for Patient: pt   Does the patient know what this is regarding?: pt need a call she may have a UTI   Would the patient rather a call back or a response via [facility]? call  Best Call Back Number:[phone]  Additional Information:  call back

## 2024-02-06 ENCOUNTER — TELEPHONE (OUTPATIENT)
Dept: UROLOGY | Facility: CLINIC | Age: 69
End: 2024-02-06
Payer: MEDICARE

## 2024-02-06 NOTE — TELEPHONE ENCOUNTER
----- Message from Bryanna Crodwer sent at 2/6/2024  2:08 PM CST -----  Regarding: URGENT: LEAKAGE  Contact: [phone]  Calling to speak with provider due to urinary incontinence, patient has no control. Please call to discuss plan of care going forward.

## 2024-02-09 ENCOUNTER — HOSPITAL ENCOUNTER (INPATIENT)
Facility: HOSPITAL | Age: 69
LOS: 9 days | Discharge: HOME HEALTH | DRG: 698 | End: 2024-02-19
Attending: EMERGENCY MEDICINE | Admitting: STUDENT IN AN ORGANIZED HEALTH CARE EDUCATION/TRAINING PROGRAM
Payer: MEDICARE

## 2024-02-09 DIAGNOSIS — Z98.890 HISTORY OF SUPRAPUBIC CATHETER: ICD-10-CM

## 2024-02-09 DIAGNOSIS — R53.81 DEBILITY: Primary | ICD-10-CM

## 2024-02-09 DIAGNOSIS — Z98.890 S/P INSERTION OF INTRATHECAL PUMP: Chronic | ICD-10-CM

## 2024-02-09 DIAGNOSIS — F11.90 CHRONIC, CONTINUOUS USE OF OPIOIDS: ICD-10-CM

## 2024-02-09 DIAGNOSIS — R06.02 SHORTNESS OF BREATH: ICD-10-CM

## 2024-02-09 DIAGNOSIS — R29.6 FREQUENT FALLS: ICD-10-CM

## 2024-02-09 DIAGNOSIS — J18.9 PNEUMONIA DUE TO INFECTIOUS ORGANISM, UNSPECIFIED LATERALITY, UNSPECIFIED PART OF LUNG: ICD-10-CM

## 2024-02-09 DIAGNOSIS — G89.4 CHRONIC PAIN SYNDROME: Chronic | ICD-10-CM

## 2024-02-09 DIAGNOSIS — I89.0 CHRONIC ACQUIRED LYMPHEDEMA: ICD-10-CM

## 2024-02-09 DIAGNOSIS — F41.1 GENERALIZED ANXIETY DISORDER: ICD-10-CM

## 2024-02-09 DIAGNOSIS — G47.01 INSOMNIA DUE TO MEDICAL CONDITION: Chronic | ICD-10-CM
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
## 2024-02-09 LAB
ALBUMIN SERPL BCP-MCNC: 3.3 G/DL (ref 3.5–5.2)
ALP SERPL-CCNC: 160 U/L (ref 55–135)
ALT SERPL W/O P-5'-P-CCNC: 10 U/L (ref 10–44)
ANION GAP SERPL CALC-SCNC: 8 MMOL/L (ref 8–16)
AST SERPL-CCNC: 16 U/L (ref 10–40)
BACTERIA #/AREA URNS HPF: ABNORMAL /HPF
BASOPHILS # BLD AUTO: 0.03 K/UL (ref 0–0.2)
BASOPHILS NFR BLD: 0.6 % (ref 0–1.9)
BILIRUB SERPL-MCNC: 0.4 MG/DL (ref 0.1–1)
BILIRUB UR QL STRIP: NEGATIVE
BNP SERPL-MCNC: 11 PG/ML (ref 0–99)
BUN SERPL-MCNC: 7 MG/DL (ref 8–23)
CALCIUM SERPL-MCNC: 8.6 MG/DL (ref 8.7–10.5)
CHLORIDE SERPL-SCNC: 102 MMOL/L (ref 95–110)
CLARITY UR: ABNORMAL
CO2 SERPL-SCNC: 33 MMOL/L (ref 23–29)
COLOR UR: YELLOW
CREAT SERPL-MCNC: 0.9 MG/DL (ref 0.5–1.4)
DIFFERENTIAL METHOD BLD: ABNORMAL
EOSINOPHIL # BLD AUTO: 0.5 K/UL (ref 0–0.5)
EOSINOPHIL NFR BLD: 9 % (ref 0–8)
ERYTHROCYTE [DISTWIDTH] IN BLOOD BY AUTOMATED COUNT: 15.6 % (ref 11.5–14.5)
EST. GFR  (NO RACE VARIABLE): >60 ML/MIN/1.73 M^2
GLUCOSE SERPL-MCNC: 99 MG/DL (ref 70–110)
GLUCOSE UR QL STRIP: NEGATIVE
HCT VFR BLD AUTO: 36.1 % (ref 37–48.5)
HGB BLD-MCNC: 11.4 G/DL (ref 12–16)
HGB UR QL STRIP: ABNORMAL
HYALINE CASTS #/AREA URNS LPF: 1 /LPF
IMM GRANULOCYTES # BLD AUTO: 0.02 K/UL (ref 0–0.04)
IMM GRANULOCYTES NFR BLD AUTO: 0.4 % (ref 0–0.5)
KETONES UR QL STRIP: NEGATIVE
LEUKOCYTE ESTERASE UR QL STRIP: ABNORMAL
LYMPHOCYTES # BLD AUTO: 1.2 K/UL (ref 1–4.8)
LYMPHOCYTES NFR BLD: 23 % (ref 18–48)
MCH RBC QN AUTO: 27.7 PG (ref 27–31)
MCHC RBC AUTO-ENTMCNC: 31.6 G/DL (ref 32–36)
MCV RBC AUTO: 88 FL (ref 82–98)
MICROSCOPIC COMMENT: ABNORMAL
MONOCYTES # BLD AUTO: 0.5 K/UL (ref 0.3–1)
MONOCYTES NFR BLD: 10.1 % (ref 4–15)
NEUTROPHILS # BLD AUTO: 3 K/UL (ref 1.8–7.7)
NEUTROPHILS NFR BLD: 56.9 % (ref 38–73)
NITRITE UR QL STRIP: POSITIVE
NRBC BLD-RTO: 0 /100 WBC
PH UR STRIP: 6 [PH] (ref 5–8)
PLATELET # BLD AUTO: 297 K/UL (ref 150–450)
PMV BLD AUTO: 10.1 FL (ref 9.2–12.9)
POTASSIUM SERPL-SCNC: 3.7 MMOL/L (ref 3.5–5.1)
PROT SERPL-MCNC: 7.6 G/DL (ref 6–8.4)
PROT UR QL STRIP: ABNORMAL
RBC # BLD AUTO: 4.12 M/UL (ref 4–5.4)
RBC #/AREA URNS HPF: 27 /HPF (ref 0–4)
SODIUM SERPL-SCNC: 143 MMOL/L (ref 136–145)
SP GR UR STRIP: 1.01 (ref 1–1.03)
SQUAMOUS #/AREA URNS HPF: 0 /HPF
TROPONIN I SERPL DL<=0.01 NG/ML-MCNC: <0.006 NG/ML (ref 0–0.03)
URN SPEC COLLECT METH UR: ABNORMAL
UROBILINOGEN UR STRIP-ACNC: NEGATIVE EU/DL
WBC # BLD AUTO: 5.25 K/UL (ref 3.9–12.7)
WBC #/AREA URNS HPF: >100 /HPF (ref 0–5)

## 2024-02-09 PROCEDURE — 63600175 PHARM REV CODE 636 W HCPCS: Mod: HCNC | Performed by: STUDENT IN AN ORGANIZED HEALTH CARE EDUCATION/TRAINING PROGRAM

## 2024-02-09 PROCEDURE — 85025 COMPLETE CBC W/AUTO DIFF WBC: CPT | Mod: HCNC

## 2024-02-09 PROCEDURE — 80053 COMPREHEN METABOLIC PANEL: CPT | Mod: HCNC

## 2024-02-09 PROCEDURE — 83880 ASSAY OF NATRIURETIC PEPTIDE: CPT | Mod: HCNC

## 2024-02-09 PROCEDURE — 81000 URINALYSIS NONAUTO W/SCOPE: CPT | Mod: HCNC

## 2024-02-09 PROCEDURE — 93005 ELECTROCARDIOGRAM TRACING: CPT | Mod: HCNC

## 2024-02-09 PROCEDURE — G0378 HOSPITAL OBSERVATION PER HR: HCPCS | Mod: HCNC

## 2024-02-09 PROCEDURE — 87088 URINE BACTERIA CULTURE: CPT | Mod: HCNC

## 2024-02-09 PROCEDURE — 93010 ELECTROCARDIOGRAM REPORT: CPT | Mod: HCNC,,, | Performed by: INTERNAL MEDICINE

## 2024-02-09 PROCEDURE — 99285 EMERGENCY DEPT VISIT HI MDM: CPT | Mod: 25,HCNC

## 2024-02-09 PROCEDURE — 87186 SC STD MICRODIL/AGAR DIL: CPT | Mod: HCNC

## 2024-02-09 PROCEDURE — 84484 ASSAY OF TROPONIN QUANT: CPT | Mod: HCNC

## 2024-02-09 PROCEDURE — 96372 THER/PROPH/DIAG INJ SC/IM: CPT | Performed by: STUDENT IN AN ORGANIZED HEALTH CARE EDUCATION/TRAINING PROGRAM

## 2024-02-09 PROCEDURE — 87086 URINE CULTURE/COLONY COUNT: CPT | Mod: HCNC

## 2024-02-09 PROCEDURE — 87077 CULTURE AEROBIC IDENTIFY: CPT | Mod: HCNC

## 2024-02-09 PROCEDURE — 25000003 PHARM REV CODE 250: Mod: HCNC | Performed by: STUDENT IN AN ORGANIZED HEALTH CARE EDUCATION/TRAINING PROGRAM

## 2024-02-09 RX ORDER — TRAZODONE HYDROCHLORIDE 100 MG/1
300 TABLET ORAL NIGHTLY
Status: DISCONTINUED | OUTPATIENT
Start: 2024-02-09 | End: 2024-02-19 | Stop reason: HOSPADM

## 2024-02-09 RX ORDER — POLYETHYLENE GLYCOL 3350 17 G/17G
17 POWDER, FOR SOLUTION ORAL DAILY
Status: DISCONTINUED | OUTPATIENT
Start: 2024-02-10 | End: 2024-02-19 | Stop reason: HOSPADM

## 2024-02-09 RX ORDER — SENNOSIDES 8.6 MG/1
2 TABLET ORAL 2 TIMES DAILY
Status: DISCONTINUED | OUTPATIENT
Start: 2024-02-09 | End: 2024-02-19 | Stop reason: HOSPADM

## 2024-02-09 RX ORDER — IBUPROFEN 200 MG
16 TABLET ORAL
Status: DISCONTINUED | OUTPATIENT
Start: 2024-02-09 | End: 2024-02-19 | Stop reason: HOSPADM

## 2024-02-09 RX ORDER — QUETIAPINE FUMARATE 100 MG/1
200 TABLET, FILM COATED ORAL NIGHTLY
Status: DISCONTINUED | OUTPATIENT
Start: 2024-02-09 | End: 2024-02-19 | Stop reason: HOSPADM

## 2024-02-09 RX ORDER — OXYBUTYNIN CHLORIDE 5 MG/1
5 TABLET, EXTENDED RELEASE ORAL DAILY
Status: DISCONTINUED | OUTPATIENT
Start: 2024-02-10 | End: 2024-02-19 | Stop reason: HOSPADM

## 2024-02-09 RX ORDER — ACETAMINOPHEN 325 MG/1
650 TABLET ORAL EVERY 4 HOURS PRN
Status: DISCONTINUED | OUTPATIENT
Start: 2024-02-09 | End: 2024-02-10

## 2024-02-09 RX ORDER — HYDROCODONE BITARTRATE AND ACETAMINOPHEN 10; 325 MG/1; MG/1
1 TABLET ORAL EVERY 6 HOURS PRN
Status: DISCONTINUED | OUTPATIENT
Start: 2024-02-09 | End: 2024-02-10

## 2024-02-09 RX ORDER — AMITRIPTYLINE HYDROCHLORIDE 25 MG/1
25 TABLET, FILM COATED ORAL NIGHTLY
COMMUNITY
Start: 2024-01-29 | End: 2024-05-02 | Stop reason: SDUPTHER

## 2024-02-09 RX ORDER — HYDROXYZINE HYDROCHLORIDE 25 MG/1
50 TABLET, FILM COATED ORAL EVERY 4 HOURS PRN
Status: DISCONTINUED | OUTPATIENT
Start: 2024-02-09 | End: 2024-02-19 | Stop reason: HOSPADM

## 2024-02-09 RX ORDER — FUROSEMIDE 40 MG/1
80 TABLET ORAL 2 TIMES DAILY
Status: DISCONTINUED | OUTPATIENT
Start: 2024-02-09 | End: 2024-02-09

## 2024-02-09 RX ORDER — SUCRALFATE 1 G/10ML
1 SUSPENSION ORAL EVERY 6 HOURS
Status: DISCONTINUED | OUTPATIENT
Start: 2024-02-10 | End: 2024-02-11

## 2024-02-09 RX ORDER — FLUTICASONE PROPIONATE 50 MCG
2 SPRAY, SUSPENSION (ML) NASAL DAILY
Status: DISCONTINUED | OUTPATIENT
Start: 2024-02-10 | End: 2024-02-19 | Stop reason: HOSPADM

## 2024-02-09 RX ORDER — GLUCAGON 1 MG
1 KIT INJECTION
Status: DISCONTINUED | OUTPATIENT
Start: 2024-02-09 | End: 2024-02-19 | Stop reason: HOSPADM

## 2024-02-09 RX ORDER — NALOXONE HCL 0.4 MG/ML
0.02 VIAL (ML) INJECTION
Status: DISCONTINUED | OUTPATIENT
Start: 2024-02-09 | End: 2024-02-19 | Stop reason: HOSPADM

## 2024-02-09 RX ORDER — SODIUM CHLORIDE 0.9 % (FLUSH) 0.9 %
10 SYRINGE (ML) INJECTION EVERY 12 HOURS PRN
Status: DISCONTINUED | OUTPATIENT
Start: 2024-02-09 | End: 2024-02-19 | Stop reason: HOSPADM

## 2024-02-09 RX ORDER — ASPIRIN 81 MG/1
81 TABLET ORAL DAILY
Status: DISCONTINUED | OUTPATIENT
Start: 2024-02-10 | End: 2024-02-19 | Stop reason: HOSPADM

## 2024-02-09 RX ORDER — LIOTHYRONINE SODIUM 5 UG/1
5 TABLET ORAL DAILY
Status: DISCONTINUED | OUTPATIENT
Start: 2024-02-10 | End: 2024-02-09

## 2024-02-09 RX ORDER — TALC
6 POWDER (GRAM) TOPICAL NIGHTLY PRN
Status: DISCONTINUED | OUTPATIENT
Start: 2024-02-09 | End: 2024-02-19 | Stop reason: HOSPADM

## 2024-02-09 RX ORDER — DULOXETIN HYDROCHLORIDE 30 MG/1
60 CAPSULE, DELAYED RELEASE ORAL 2 TIMES DAILY
Status: DISCONTINUED | OUTPATIENT
Start: 2024-02-09 | End: 2024-02-19 | Stop reason: HOSPADM

## 2024-02-09 RX ORDER — HYDROCORTISONE 5 MG/1
10 TABLET ORAL NIGHTLY
Status: DISCONTINUED | OUTPATIENT
Start: 2024-02-09 | End: 2024-02-19 | Stop reason: HOSPADM

## 2024-02-09 RX ORDER — ALUMINUM HYDROXIDE, MAGNESIUM HYDROXIDE, AND SIMETHICONE 1200; 120; 1200 MG/30ML; MG/30ML; MG/30ML
30 SUSPENSION ORAL
Status: DISCONTINUED | OUTPATIENT
Start: 2024-02-09 | End: 2024-02-11

## 2024-02-09 RX ORDER — PROMETHAZINE HYDROCHLORIDE 25 MG/1
25 TABLET ORAL EVERY 6 HOURS PRN
Status: DISCONTINUED | OUTPATIENT
Start: 2024-02-09 | End: 2024-02-09

## 2024-02-09 RX ORDER — IBUPROFEN 200 MG
24 TABLET ORAL
Status: DISCONTINUED | OUTPATIENT
Start: 2024-02-09 | End: 2024-02-19 | Stop reason: HOSPADM

## 2024-02-09 RX ORDER — ATORVASTATIN CALCIUM 40 MG/1
80 TABLET, FILM COATED ORAL DAILY
Status: DISCONTINUED | OUTPATIENT
Start: 2024-02-10 | End: 2024-02-19 | Stop reason: HOSPADM

## 2024-02-09 RX ORDER — FLUDROCORTISONE ACETATE 0.1 MG/1
100 TABLET ORAL DAILY
Status: DISCONTINUED | OUTPATIENT
Start: 2024-02-10 | End: 2024-02-19 | Stop reason: HOSPADM

## 2024-02-09 RX ORDER — ONDANSETRON HYDROCHLORIDE 2 MG/ML
4 INJECTION, SOLUTION INTRAVENOUS EVERY 8 HOURS PRN
Status: DISCONTINUED | OUTPATIENT
Start: 2024-02-09 | End: 2024-02-19 | Stop reason: HOSPADM

## 2024-02-09 RX ORDER — FUROSEMIDE 10 MG/ML
80 INJECTION INTRAMUSCULAR; INTRAVENOUS EVERY 12 HOURS
Status: DISCONTINUED | OUTPATIENT
Start: 2024-02-10 | End: 2024-02-10

## 2024-02-09 RX ORDER — SIMETHICONE 80 MG
1 TABLET,CHEWABLE ORAL 4 TIMES DAILY PRN
Status: DISCONTINUED | OUTPATIENT
Start: 2024-02-09 | End: 2024-02-19 | Stop reason: HOSPADM

## 2024-02-09 RX ORDER — ACETAMINOPHEN 325 MG/1
650 TABLET ORAL EVERY 8 HOURS PRN
Status: DISCONTINUED | OUTPATIENT
Start: 2024-02-09 | End: 2024-02-10

## 2024-02-09 RX ORDER — HEPARIN SODIUM 5000 [USP'U]/ML
5000 INJECTION, SOLUTION INTRAVENOUS; SUBCUTANEOUS EVERY 8 HOURS
Status: DISCONTINUED | OUTPATIENT
Start: 2024-02-09 | End: 2024-02-11

## 2024-02-09 RX ADMIN — DULOXETINE 60 MG: 30 CAPSULE, DELAYED RELEASE ORAL at 09:02

## 2024-02-09 RX ADMIN — HYDROCORTISONE 10 MG: 5 TABLET ORAL at 09:02

## 2024-02-09 RX ADMIN — SENNOSIDES 2 TABLET: 8.6 TABLET, FILM COATED ORAL at 09:02

## 2024-02-09 RX ADMIN — HEPARIN SODIUM 5000 UNITS: 5000 INJECTION INTRAVENOUS; SUBCUTANEOUS at 09:02

## 2024-02-09 RX ADMIN — FUROSEMIDE 80 MG: 40 TABLET ORAL at 09:02

## 2024-02-09 RX ADMIN — ALUMINUM HYDROXIDE, MAGNESIUM HYDROXIDE, AND SIMETHICONE 30 ML: 200; 200; 20 SUSPENSION ORAL at 09:02

## 2024-02-09 NOTE — ED PROVIDER NOTES
Encounter Date: 2/9/2024       History     Chief Complaint   Patient presents with    Leg Pain     Pt complains of increased swelling to bilateral lower leg edema with redness and blisters, pt also reports blisters, pt alos reports CP that is intermittent with SOB, hx of CHF,     Female  Problem     Pt reports leaking from indwelling cath     Fall     Pt reports requests falls, last fall last night with + LOC reported      Patient is a 68-year-old female who presents to the ED with multiple medical complaints.  Patient has a history of chronic bilateral lower extremity lymphadenopathy.  She reports worsening leg swelling, pain, blisters that has not improved with p.o. Lasix. She states that home health comes to her home on a weekly basis to change the dressings on her legs.  She reports more frequent falling secondary to the worsening lower extremity swelling.  Patient also reports that her suprapubic catheter is leaking around the insertion site.  She states that home health was supposed to come out 3 weeks ago to change it but has not.  She denies any overt chest pain, shortness of breath, significant trauma in light of her recent falls, nausea, vomiting, fevers chills chest pain when explicitly questioned.    Pt with extensive history of intractable pain of both lower legs and feet, subacute fractures of the R foot, chronic lymphedema of the bilateral lower extremities and debility - she is a high fall risk and in light of recent fractures, she has worsening difficulty ambulating; this is further complicated by her underlying CHF.     Per chart check, pt had discussion with her physician regarding potential benefits of placement into a SNF vs [facility] nursing facility.        Review of patient's allergies indicates:   Allergen Reactions    (d)-limonene flavor      Other reaction(s): difficult intubation  Other reaction(s): Difficulty breathing    Benadryl [diphenhydramine hcl] Shortness Of Breath    Fentanyl  Itching, Nausea And Vomiting and Swelling             Imitrex [sumatriptan succinate] Shortness Of Breath    Oxycodone-acetaminophen Shortness Of Breath    Sulfamethoxazole-trimethoprim Anaphylaxis    Topiramate Shortness Of Breath    Vancomycin Anaphylaxis     Rash    Butorphanol tartrate     Evening primrose (oenothera biennis)     Latex     Pregabalin Other (See Comments)     tremors    Propoxyphene n-acetaminophen      Other reaction(s): Difficulty breathing    Sumatriptan     White petrolatum-zinc     Zinc acetate     Zinc oxide-white petrolatum      Other reaction(s): Difficulty breathing    Latex, natural rubber Itching and Rash    Phenytoin Rash and Other (See Comments)     Trouble breathing     Past Medical History:   Diagnosis Date    Anxiety     Arthritis     Bilateral lower extremity edema     severe chronic    Carotid artery occlusion     Cataract     CHF (congestive heart failure)     Coronary artery disease     subtotalled LAD with collateral    Depression     Fever blister     Hard of hearing     Hypokalemia 01/09/2023    Hyponatremia 02/04/2022    Hypothyroid     Iron deficiency anemia     Lumbar radiculopathy     with chronic pain    Ocular migraine     Other emphysema 05/22/2023    Renal disorder     Sleep apnea     cpap     Past Surgical History:   Procedure Laterality Date    ADENOIDECTOMY      BACK SURGERY      x 12    CARDIAC CATHETERIZATION  2016    subtotalled LAD with right to left collaterals    CATARACT EXTRACTION W/  INTRAOCULAR LENS IMPLANT Left     Dr Coleman     CYSTOSCOPIC LITHOLAPAXY N/A 6/27/2019    Procedure: CYSTOLITHOLAPAXY;  Surgeon: Shireen Mayo MD;  Location: [address];  Service: Urology;  Laterality: N/A;    CYSTOSCOPIC LITHOLAPAXY N/A 9/3/2019    Procedure: CYSTOLITHOLAPAXY;  Surgeon: Shireen Mayo MD;  Location: [address];  Service: Urology;  Laterality: N/A;    CYSTOSCOPY N/A 7/13/2021    Procedure: CYSTOSCOPY;  Surgeon: Shireen Mayo MD;   Location: [facility] OR 1ST FLR;  Service: Urology;  Laterality: N/A;    CYSTOSCOPY  11/16/2021    Procedure: CYSTOSCOPY;  Surgeon: Shireen Mayo MD;  [facility];  Service: Urology;;    CYSTOSCOPY  7/19/2022    Procedure: CYSTOSCOPY;  Surgeon: Shireen Mayo MD;  [facility];  Service: Urology;;    CYSTOSCOPY WITH INJECTION OF PERIURETHRAL BULKING AGENT  7/19/2022    Procedure: CYSTOSCOPY, WITH PERIURETHRAL BULKING AGENT INJECTION-MACROPLASTIQUE;  Surgeon: Shireen Mayo MD;  [facility];  Service: Urology;;    CYSTOSCOPY WITH INJECTION OF PERIURETHRAL BULKING AGENT N/A 3/28/2023    Procedure: CYSTOSCOPY, WITH PERIURETHRAL BULKING AGENT INJECTION;  Surgeon: Shireen Mayo MD;  [facility];  Service: Urology;  Laterality: N/A;  [place]    CYSTOSCOPY WITH INJECTION OF PERIURETHRAL BULKING AGENT N/A 11/14/2023    Procedure: CYSTOSCOPY, WITH PERIURETHRAL BULKING AGENT INJECTION;  Surgeon: Shireen Mayo MD;  [facility];  Service: Urology;  Laterality: N/A;    CYSTOSCOPY,WITH BOTULINUM TOXIN INJECTION N/A 12/13/2022    Procedure: CYSTOSCOPY,WITH BOTULINUM TOXIN INJECTION;  Surgeon: Shireen Mayo MD;  [facility];  Service: Urology;  Laterality: N/A;  300 U    CYSTOSCOPY,WITH BOTULINUM TOXIN INJECTION N/A 3/28/2023    Procedure: CYSTOSCOPY,WITH BOTULINUM TOXIN INJECTION;  Surgeon: Shireen Mayo MD;  [facility];  Service: Urology;  Laterality: N/A;  45 MIN.    300 UNITS    ESOPHAGOGASTRODUODENOSCOPY N/A 5/23/2018    Procedure: ESOPHAGOGASTRODUODENOSCOPY (EGD);  Surgeon: Prince Vance MD;  Location: [facility] (4TH FLR);  Service: Endoscopy;  Laterality: N/A;  r/s 'd per Dr. Vance due to family emergency- ER    ESOPHAGOGASTRODUODENOSCOPY N/A 6/23/2023    Procedure: EGD (ESOPHAGOGASTRODUODENOSCOPY);  Surgeon: Juaquin Barry MD;  Location: [facility] ([address] [place] [address]);  Service: Endoscopy;  Laterality:  N/A;  Refferal: ROSEANNE MTZ  Order  tele enc 5/18/23  dx: history of a Nissen fundoplication  Additional Scheduling Information:  On home oxygen 2nd floor for airway protection also with a pain pump elevated BMI close to 40   Prep Gastroparesis   ins    HYSTERECTOMY  1975    endometriosis    INJECTION OF BOTULINUM TOXIN TYPE A  7/13/2021    Procedure: INJECTION, BOTULINUM TOXIN, 200units;  Surgeon: Shireen Mayo MD;  [facility];  Service: Urology;;    INJECTION OF BOTULINUM TOXIN TYPE A  11/16/2021    Procedure: INJECTION, BOTULINUM TOXIN, 200units;  Surgeon: Shireen Mayo MD;  [facility];  Service: Urology;;    INJECTION OF BOTULINUM TOXIN TYPE A  7/19/2022    Procedure: INJECTION, BOTULINUM TOXIN, 300 units ;  Surgeon: Shireen Mayo MD;  [facility];  Service: Urology;;    INJECTION OF BOTULINUM TOXIN TYPE A N/A 11/14/2023    Procedure: INJECTION, BOTULINUM TOXIN, TYPE A;  Surgeon: Shireen Mayo MD;  [facility];  Service: Urology;  Laterality: N/A;    INSERTION, SUPRAPUBIC CATHETER N/A 12/13/2022    Procedure: INSERTION, SUPRAPUBIC CATHETER;  Surgeon: Shireen Mayo MD;  [facility];  Service: Urology;  Laterality: N/A;  exchange    INSERTION, SUPRAPUBIC CATHETER N/A 11/14/2023    Procedure: INSERTION, SUPRAPUBIC CATHETER;  Surgeon: Shireen Mayo MD;  Location: [facility] OR [address] [place] [address];  Service: Urology;  Laterality: N/A;  EXCHANGE of s/p tube    pain pump placement      SQ Dilaudid Pump managed by Dr. Hillman, Pain Management    REMOVAL OF BONE SPUR OF FOOT Bilateral 9/16/2022    Procedure: EXCISION ARTHRITIC BONE, BILATERAL FOOT;  Surgeon: Adam Mcguire DPM;  Location: [facility];  Service: Podiatry;  Laterality: Bilateral;    REPLACEMENT OF BACLOFEN PUMP N/A 8/2/2023    Procedure: REPLACEMENT, BACLOFEN PUMP;  Surgeon: Smitha Condon MD;  Location: [facility] OR 2ND FLR;  Service: Neurosurgery;  Laterality: N/A;   "Anes: Gen  Blood: Type & Screen  Pos: Left Lat  Intrathecal Pump  Bed: Reg Reversed  Rad: C-arm  Pump: 40 cc, [employer]tron[employer]    REPLACEMENT OF CATHETER N/A 10/31/2019    Procedure: REPLACEMENT, CATHETER-SUPRAPUBIC;  Surgeon: Shireen Mayo MD;  Location: [facility] OR [address] [place] [address];  Service: Urology;  Laterality: N/A;    SPINAL CORD STIMULATOR REMOVAL      before [place]    SPINE SURGERY  5-13-13    CERVICAL FUSION    TONSILLECTOMY       Family History   Problem Relation Age of Onset    Cancer Mother 55        breast    Cancer Father         esophagus,had laryngectomy    Esophageal cancer Father     Parkinsonism Maternal Grandmother     Tremor Maternal Grandmother     No Known Problems Brother     No Known Problems Brother     Heart disease Maternal Uncle     Colon cancer Maternal Uncle         Less than 60    No Known Problems Sister     No Known Problems Maternal Aunt     Cirrhosis Paternal Aunt         ETOH    Liver disease Paternal Aunt         ETOH    Liver disease Paternal Uncle         ETOH    Cirrhosis Paternal Uncle         ETOH    No Known Problems Maternal Grandfather     No Known Problems Paternal Grandmother     No Known Problems Paternal Grandfather     Melanoma Neg Hx     Amblyopia Neg Hx     Blindness Neg Hx     Cataracts Neg Hx     Diabetes Neg Hx     Glaucoma Neg Hx     Hypertension Neg Hx     Macular degeneration Neg Hx     Retinal detachment Neg Hx     Strabismus Neg Hx     Stroke Neg Hx     Thyroid disease Neg Hx     Celiac disease Neg Hx     Colon polyps Neg Hx     Cystic fibrosis Neg Hx     Crohn's disease Neg Hx     Inflammatory bowel disease Neg Hx     Liver cancer Neg Hx     Rectal cancer Neg Hx     Stomach cancer Neg Hx     Ulcerative colitis Neg Hx     Lymphoma Neg Hx      Social History     Tobacco Use    Smoking status: Never    Smokeless tobacco: Never   Substance Use Topics    Alcohol use: Never    Drug use: Yes     Types: Marijuana     Comment: "medical marijuana gummies"     Review of Systems "   Constitutional:  Negative for chills and fever.   Respiratory:  Negative for cough and shortness of breath.    Cardiovascular:  Positive for leg swelling. Negative for chest pain.   Gastrointestinal:  Negative for abdominal pain, nausea and vomiting.   Musculoskeletal:  Negative for back pain, joint swelling and myalgias.   Neurological:  Negative for dizziness and headaches.   Psychiatric/Behavioral:  Negative for agitation and confusion.        Physical Exam     Initial Vitals [02/09/24 1412]   BP Pulse Resp Temp SpO2   134/60 70 20 97.3 °F (36.3 °C) (!) 92 %      MAP       --         Physical Exam    Nursing note and vitals reviewed.  Constitutional: She appears well-developed and well-nourished.   HENT:   Head: Normocephalic and atraumatic.   Right Ear: External ear normal.   Left Ear: External ear normal.   Eyes: EOM are normal. Pupils are equal, round, and reactive to light.   Neck: Neck supple.   Normal range of motion.  Cardiovascular:  Normal rate, regular rhythm, normal heart sounds and intact distal pulses.           Pulmonary/Chest: Breath sounds normal.   Abdominal: Abdomen is soft. Bowel sounds are normal.   Suprapubic catheter in place.    Musculoskeletal:         General: Tenderness and edema present.      Cervical back: Normal range of motion and neck supple.      Comments: Significant leg swelling/lymphedema to the bilateral lower extremities.      Neurological: She is alert and oriented to person, place, and time. She has normal strength.   Skin: Skin is warm. Capillary refill takes less than 2 seconds.   Psychiatric: She has a normal mood and affect.         ED Course   Procedures  Labs Reviewed   URINALYSIS, REFLEX TO URINE CULTURE - Abnormal; Notable for the following components:       Result Value    Appearance, UA Cloudy (*)     Protein, UA 2+ (*)     Occult Blood UA 3+ (*)     Nitrite, UA Positive (*)     Leukocytes, UA 3+ (*)     All other components within normal limits    Narrative:      Specimen Source->Urine   CBC W/ AUTO DIFFERENTIAL - Abnormal; Notable for the following components:    Hemoglobin 11.4 (*)     Hematocrit 36.1 (*)     MCHC 31.6 (*)     RDW 15.6 (*)     Eosinophil % 9.0 (*)     All other components within normal limits   COMPREHENSIVE METABOLIC PANEL - Abnormal; Notable for the following components:    CO2 33 (*)     BUN 7 (*)     Calcium 8.6 (*)     Albumin 3.3 (*)     Alkaline Phosphatase 160 (*)     All other components within normal limits   URINALYSIS MICROSCOPIC - Abnormal; Notable for the following components:    RBC, UA 27 (*)     WBC, UA >100 (*)     Bacteria Few (*)     All other components within normal limits    Narrative:     Specimen Source->Urine   CULTURE, URINE   TROPONIN I   B-TYPE NATRIURETIC PEPTIDE          Imaging Results              CT Head Without Contrast (Final result)  Result time 02/09/24 16:08:42      Final result by Paco Torres MD (02/09/24 16:08:42)                   Impression:      No acute intracranial process.      Electronically signed by: Paco Torres  Date:    02/09/2024  Time:    16:08               Narrative:    EXAMINATION:  CT HEAD WITHOUT CONTRAST    CLINICAL HISTORY:  Head trauma, minor (Age >= 65y);    TECHNIQUE:  Low dose axial CT images obtained throughout the head without intravenous contrast. Sagittal and coronal reconstructions were performed.    COMPARISON:  02/08/2022    FINDINGS:  Intracranial compartment:    Ventricles and sulci are normal in size for age without evidence of hydrocephalus. No extra-axial blood or fluid collections.    The brain parenchyma appears normal. No parenchymal mass, hemorrhage, edema or major vascular distribution infarct.    Skull/extracranial contents (limited evaluation): No fracture. Mastoid air cells and paranasal sinuses are essentially clear.                                       X-Ray Chest PA And Lateral (Final result)  Result time 02/09/24 15:24:11      Final result by Paco Torres,  MD (02/09/24 15:24:11)                   Impression:      See above comments.  Recommend follow-up as clinically indicated.      Electronically signed by: Paco Aleman  Date:    02/09/2024  Time:    15:24               Narrative:    EXAMINATION:  XR CHEST PA AND LATERAL    CLINICAL HISTORY:  Chest pain, unspecified    TECHNIQUE:  PA and lateral views of the chest were performed.    COMPARISON:  01/04/2024    FINDINGS:  The patient is slightly rotated obscuring visualization.  Osseous demineralization.    Postop changes of the cervical spine.  Neurostimulator leads in the lower thoracic spine.    Suboptimal inspiration.    Semi focal mass or consolidation.  No significant effusion or pneumothorax.    Mild left basilar atelectasis.    Large probable hiatal hernia in the retrocardiac region.  Adjacent atelectasis or wall thickening is not excluded.  Upper GI or endoscopy may better characterize.                                       Medications   aspirin EC tablet 81 mg (has no administration in time range)   atorvastatin tablet 80 mg (has no administration in time range)   traZODone tablet 300 mg (has no administration in time range)   tiotropium bromide 2.5 mcg/actuation inhaler 2 puff (has no administration in time range)   senna tablet 2 tablet (has no administration in time range)   oxybutynin 24 hr tablet 5 mg (has no administration in time range)   DULoxetine DR capsule 60 mg (has no administration in time range)   fludrocortisone tablet 100 mcg (has no administration in time range)   fluticasone propionate 50 mcg/actuation nasal spray 100 mcg (has no administration in time range)   furosemide tablet 80 mg (has no administration in time range)   HYDROcodone-acetaminophen  mg per tablet 1 tablet (has no administration in time range)   hydrocortisone tablet 10 mg (has no administration in time range)   hydrOXYzine HCL tablet 50 mg (has no administration in time range)   levothyroxine tablet 150 mcg (has no  administration in time range)   liothyronine tablet 5 mcg (has no administration in time range)   QUEtiapine tablet 200 mg (has no administration in time range)   sucralfate 100 mg/mL suspension 1 g (has no administration in time range)   aluminum-magnesium hydroxide-simethicone 200-200-20 mg/5 mL suspension 30 mL (has no administration in time range)   sodium chloride 0.9% flush 10 mL (has no administration in time range)   naloxone 0.4 mg/mL injection 0.02 mg (has no administration in time range)   glucose chewable tablet 16 g (has no administration in time range)   glucose chewable tablet 24 g (has no administration in time range)   glucagon (human recombinant) injection 1 mg (has no administration in time range)   heparin (porcine) injection 5,000 Units (has no administration in time range)   acetaminophen tablet 650 mg (has no administration in time range)   polyethylene glycol packet 17 g (has no administration in time range)   ondansetron injection 4 mg (has no administration in time range)   acetaminophen tablet 650 mg (has no administration in time range)   melatonin tablet 6 mg (has no administration in time range)   simethicone chewable tablet 80 mg (has no administration in time range)   dextrose 10% bolus 125 mL 125 mL (has no administration in time range)   dextrose 10% bolus 250 mL 250 mL (has no administration in time range)     Medical Decision Making  Amount and/or Complexity of Data Reviewed  Labs:  Decision-making details documented in ED Course.  Radiology:  Decision-making details documented in ED Course.               ED Course as of 02/09/24 1821   Fri Feb 09, 2024   1617 Hemoglobin(!): 11.4 [LC]   1617 Hematocrit(!): 36.1 [LC]   1628 CT Head Without Contrast  NO acute intracranial abnormality per final radiology read.  [LC]   1640 X-Ray Chest PA And Lateral  No acute cardiopulmonary process per my independent interpretation.     ostop changes of the cervical spine.  Neurostimulator leads in  the lower thoracic spine.     Suboptimal inspiration.     Semi focal mass or consolidation.  No significant effusion or pneumothorax.     Mild left basilar atelectasis.     Large probable hiatal hernia in the retrocardiac region.  Adjacent atelectasis or wall thickening is not excluded.  Upper GI or endoscopy may better characterize.      [LC]   1647 Troponin I: <0.006 [LC]   1647 Sodium: 143 [LC]   1647 BNP: 11 [LC]      ED Course User Index  [LC] Tushar Muniz MD                 Medical Decision Making:   Clinical Tests:   Lab Tests: Reviewed and Ordered  Radiological Study: Ordered  ED Management:  - patient with worsening lymphadenopathy which appears to be chronic in nature as well as subacute bilateral foot fractures thus impairing the patient's ability to ambulate safely.  Patient does report more frequent falls that she believes is secondary to the aforementioned lower extremity changes and fracture; the  at bedside does not feel it is safe to send the patient back home with him and he states he is unable to fully care for her or lift her up to provide the care that she needs.  As such, I will advocate for admission for debility and possible SNF placement. I discussed the case with the [facility] who will admit the patient to his service; both pt and  at bedside in agreement with plan for admission at this time              Clinical Impression:  Final diagnoses:  [R06.02] Shortness of breath  [R53.81] Debility (Primary)  [I89.0] Chronic acquired lymphedema  [R29.6] Frequent falls  [Z98.890] History of suprapubic catheter          ED Disposition Condition    Observation Stable                Tushar Muniz MD  02/09/24 1821       Tushar Muniz MD  02/09/24 1836

## 2024-02-10 PROBLEM — J96.11 CHRONIC HYPOXIC RESPIRATORY FAILURE: Status: ACTIVE | Noted: 2024-02-10

## 2024-02-10 LAB
ANION GAP SERPL CALC-SCNC: 7 MMOL/L (ref 8–16)
BASOPHILS # BLD AUTO: 0.02 K/UL (ref 0–0.2)
BASOPHILS NFR BLD: 0.4 % (ref 0–1.9)
BUN SERPL-MCNC: 6 MG/DL (ref 8–23)
CALCIUM SERPL-MCNC: 8.4 MG/DL (ref 8.7–10.5)
CHLORIDE SERPL-SCNC: 105 MMOL/L (ref 95–110)
CO2 SERPL-SCNC: 30 MMOL/L (ref 23–29)
CREAT SERPL-MCNC: 0.8 MG/DL (ref 0.5–1.4)
DIFFERENTIAL METHOD BLD: ABNORMAL
EOSINOPHIL # BLD AUTO: 0.4 K/UL (ref 0–0.5)
EOSINOPHIL NFR BLD: 8.5 % (ref 0–8)
ERYTHROCYTE [DISTWIDTH] IN BLOOD BY AUTOMATED COUNT: 15.9 % (ref 11.5–14.5)
EST. GFR  (NO RACE VARIABLE): >60 ML/MIN/1.73 M^2
GLUCOSE SERPL-MCNC: 109 MG/DL (ref 70–110)
HCT VFR BLD AUTO: 34.5 % (ref 37–48.5)
HGB BLD-MCNC: 10.5 G/DL (ref 12–16)
IMM GRANULOCYTES # BLD AUTO: 0.01 K/UL (ref 0–0.04)
IMM GRANULOCYTES NFR BLD AUTO: 0.2 % (ref 0–0.5)
LYMPHOCYTES # BLD AUTO: 1 K/UL (ref 1–4.8)
LYMPHOCYTES NFR BLD: 21.8 % (ref 18–48)
MCH RBC QN AUTO: 26.9 PG (ref 27–31)
MCHC RBC AUTO-ENTMCNC: 30.4 G/DL (ref 32–36)
MCV RBC AUTO: 88 FL (ref 82–98)
MONOCYTES # BLD AUTO: 0.4 K/UL (ref 0.3–1)
MONOCYTES NFR BLD: 8.9 % (ref 4–15)
NEUTROPHILS # BLD AUTO: 2.7 K/UL (ref 1.8–7.7)
NEUTROPHILS NFR BLD: 60.2 % (ref 38–73)
NRBC BLD-RTO: 0 /100 WBC
PLATELET # BLD AUTO: 227 K/UL (ref 150–450)
PMV BLD AUTO: 9.3 FL (ref 9.2–12.9)
POTASSIUM SERPL-SCNC: 3.9 MMOL/L (ref 3.5–5.1)
RBC # BLD AUTO: 3.91 M/UL (ref 4–5.4)
SODIUM SERPL-SCNC: 142 MMOL/L (ref 136–145)
WBC # BLD AUTO: 4.49 K/UL (ref 3.9–12.7)

## 2024-02-10 PROCEDURE — 25000242 PHARM REV CODE 250 ALT 637 W/ HCPCS: Mod: HCNC | Performed by: STUDENT IN AN ORGANIZED HEALTH CARE EDUCATION/TRAINING PROGRAM

## 2024-02-10 PROCEDURE — 97535 SELF CARE MNGMENT TRAINING: CPT | Mod: HCNC

## 2024-02-10 PROCEDURE — 30200315 PPD INTRADERMAL TEST REV CODE 302: Mod: HCNC | Performed by: STUDENT IN AN ORGANIZED HEALTH CARE EDUCATION/TRAINING PROGRAM

## 2024-02-10 PROCEDURE — 63600175 PHARM REV CODE 636 W HCPCS: Mod: HCNC | Performed by: STUDENT IN AN ORGANIZED HEALTH CARE EDUCATION/TRAINING PROGRAM

## 2024-02-10 PROCEDURE — 36415 COLL VENOUS BLD VENIPUNCTURE: CPT | Mod: HCNC | Performed by: STUDENT IN AN ORGANIZED HEALTH CARE EDUCATION/TRAINING PROGRAM

## 2024-02-10 PROCEDURE — 97166 OT EVAL MOD COMPLEX 45 MIN: CPT | Mod: HCNC

## 2024-02-10 PROCEDURE — 96372 THER/PROPH/DIAG INJ SC/IM: CPT | Performed by: STUDENT IN AN ORGANIZED HEALTH CARE EDUCATION/TRAINING PROGRAM

## 2024-02-10 PROCEDURE — 11000001 HC ACUTE MED/SURG PRIVATE ROOM: Mod: HCNC

## 2024-02-10 PROCEDURE — 25000003 PHARM REV CODE 250: Mod: HCNC | Performed by: STUDENT IN AN ORGANIZED HEALTH CARE EDUCATION/TRAINING PROGRAM

## 2024-02-10 PROCEDURE — 97530 THERAPEUTIC ACTIVITIES: CPT | Mod: HCNC

## 2024-02-10 PROCEDURE — 86580 TB INTRADERMAL TEST: CPT | Mod: HCNC | Performed by: STUDENT IN AN ORGANIZED HEALTH CARE EDUCATION/TRAINING PROGRAM

## 2024-02-10 PROCEDURE — 85025 COMPLETE CBC W/AUTO DIFF WBC: CPT | Mod: HCNC | Performed by: STUDENT IN AN ORGANIZED HEALTH CARE EDUCATION/TRAINING PROGRAM

## 2024-02-10 PROCEDURE — 94640 AIRWAY INHALATION TREATMENT: CPT | Mod: HCNC

## 2024-02-10 PROCEDURE — 80048 BASIC METABOLIC PNL TOTAL CA: CPT | Mod: HCNC | Performed by: STUDENT IN AN ORGANIZED HEALTH CARE EDUCATION/TRAINING PROGRAM

## 2024-02-10 PROCEDURE — 97161 PT EVAL LOW COMPLEX 20 MIN: CPT | Mod: HCNC

## 2024-02-10 RX ORDER — FUROSEMIDE 10 MG/ML
20 INJECTION INTRAMUSCULAR; INTRAVENOUS EVERY 12 HOURS
Status: DISCONTINUED | OUTPATIENT
Start: 2024-02-10 | End: 2024-02-19 | Stop reason: HOSPADM

## 2024-02-10 RX ORDER — OXYCODONE HYDROCHLORIDE 5 MG/1
5 TABLET ORAL EVERY 6 HOURS PRN
Status: DISCONTINUED | OUTPATIENT
Start: 2024-02-10 | End: 2024-02-13

## 2024-02-10 RX ORDER — ACETAMINOPHEN 325 MG/1
650 TABLET ORAL EVERY 8 HOURS PRN
Status: DISCONTINUED | OUTPATIENT
Start: 2024-02-10 | End: 2024-02-19 | Stop reason: HOSPADM

## 2024-02-10 RX ADMIN — SENNOSIDES 2 TABLET: 8.6 TABLET, FILM COATED ORAL at 09:02

## 2024-02-10 RX ADMIN — ATORVASTATIN CALCIUM 80 MG: 40 TABLET, FILM COATED ORAL at 08:02

## 2024-02-10 RX ADMIN — TRAZODONE HYDROCHLORIDE 300 MG: 100 TABLET ORAL at 09:02

## 2024-02-10 RX ADMIN — LEVOTHYROXINE SODIUM 150 MCG: 25 TABLET ORAL at 06:02

## 2024-02-10 RX ADMIN — HEPARIN SODIUM 5000 UNITS: 5000 INJECTION INTRAVENOUS; SUBCUTANEOUS at 09:02

## 2024-02-10 RX ADMIN — DULOXETINE 60 MG: 30 CAPSULE, DELAYED RELEASE ORAL at 08:02

## 2024-02-10 RX ADMIN — DULOXETINE 60 MG: 30 CAPSULE, DELAYED RELEASE ORAL at 09:02

## 2024-02-10 RX ADMIN — FUROSEMIDE 20 MG: 10 INJECTION, SOLUTION INTRAMUSCULAR; INTRAVENOUS at 10:02

## 2024-02-10 RX ADMIN — OXYBUTYNIN CHLORIDE 5 MG: 5 TABLET, EXTENDED RELEASE ORAL at 08:02

## 2024-02-10 RX ADMIN — FLUTICASONE PROPIONATE 100 MCG: 50 SPRAY, METERED NASAL at 08:02

## 2024-02-10 RX ADMIN — HEPARIN SODIUM 5000 UNITS: 5000 INJECTION INTRAVENOUS; SUBCUTANEOUS at 01:02

## 2024-02-10 RX ADMIN — ASPIRIN 81 MG: 81 TABLET, COATED ORAL at 08:02

## 2024-02-10 RX ADMIN — HEPARIN SODIUM 5000 UNITS: 5000 INJECTION INTRAVENOUS; SUBCUTANEOUS at 06:02

## 2024-02-10 RX ADMIN — QUETIAPINE FUMARATE 200 MG: 100 TABLET ORAL at 09:02

## 2024-02-10 RX ADMIN — TIOTROPIUM BROMIDE INHALATION SPRAY 2 PUFF: 3.12 SPRAY, METERED RESPIRATORY (INHALATION) at 09:02

## 2024-02-10 RX ADMIN — HYDROCORTISONE 10 MG: 5 TABLET ORAL at 09:02

## 2024-02-10 RX ADMIN — HYDROCODONE BITARTRATE AND ACETAMINOPHEN 1 TABLET: 10; 325 TABLET ORAL at 10:02

## 2024-02-10 RX ADMIN — TUBERCULIN PURIFIED PROTEIN DERIVATIVE 5 UNITS: 5 INJECTION, SOLUTION INTRADERMAL at 03:02

## 2024-02-10 RX ADMIN — FLUDROCORTISONE ACETATE 100 MCG: 0.1 TABLET ORAL at 08:02

## 2024-02-10 RX ADMIN — FUROSEMIDE 20 MG: 10 INJECTION, SOLUTION INTRAMUSCULAR; INTRAVENOUS at 09:02

## 2024-02-10 RX ADMIN — CEFTRIAXONE 1 G: 1 INJECTION, POWDER, FOR SOLUTION INTRAMUSCULAR; INTRAVENOUS at 12:02

## 2024-02-10 RX ADMIN — HYDROXYZINE HYDROCHLORIDE 50 MG: 25 TABLET ORAL at 10:02

## 2024-02-10 NOTE — ASSESSMENT & PLAN NOTE
Patient's anemia is currently controlled. Has not received any PRBCs to date. Etiology likely d/t Iron deficiency  Current CBC reviewed-   Lab Results   Component Value Date    HGB 11.4 (L) 02/09/2024    HCT 36.1 (L) 02/09/2024     Monitor serial CBC and transfuse if patient becomes hemodynamically unstable, symptomatic or H/H drops below 7/21.

## 2024-02-10 NOTE — SUBJECTIVE & OBJECTIVE
Interval History: c/o bilateral leg pain & swelling. Unaware of when suprapubic catheter was last exchanged but thinks it is due.     Review of Systems  Objective:     Vital Signs (Most Recent):  Temp: 97.9 °F (36.6 °C) (02/10/24 1140)  Pulse: 70 (02/10/24 1140)  Resp: 18 (02/10/24 1140)  BP: (!) 98/54 (02/10/24 1140)  SpO2: 97 % (02/10/24 1140) Vital Signs (24h Range):  Temp:  [97.8 °F (36.6 °C)-98.1 °F (36.7 °C)] 97.9 °F (36.6 °C)  Pulse:  [65-73] 70  Resp:  [13-27] 18  SpO2:  [93 %-100 %] 97 %  BP: ()/(51-56) 98/54     Weight: 109.1 kg (240 lb 8.4 oz)  Body mass index is 37.67 kg/m².    Intake/Output Summary (Last 24 hours) at 2/10/2024 1429  Last data filed at 2/10/2024 0635  Gross per 24 hour   Intake 96.66 ml   Output 1700 ml   Net -1603.34 ml         Physical Exam  Vitals and nursing note reviewed.   Constitutional:       General: She is not in acute distress.  Cardiovascular:      Rate and Rhythm: Normal rate and regular rhythm.   Pulmonary:      Effort: Pulmonary effort is normal. No respiratory distress.   Abdominal:      Palpations: Abdomen is soft.      Tenderness: There is no abdominal tenderness.   Genitourinary:     Comments: Suprapubic catheter in place  Musculoskeletal:      Right lower leg: Edema present.      Left lower leg: Edema present.   Skin:     General: Skin is warm and dry.      Comments: Erythematous b/l legs   Neurological:      General: No focal deficit present.      Mental Status: She is alert and oriented to person, place, and time.   Psychiatric:         Mood and Affect: Mood normal.             Significant Labs: All pertinent labs within the past 24 hours have been reviewed.    Significant Imaging: I have reviewed all pertinent imaging results/findings within the past 24 hours.

## 2024-02-10 NOTE — ASSESSMENT & PLAN NOTE
Patient with Hypercapnic and Hypoxic Respiratory failure which is Chronic.  she is on home oxygen at 2-3 LPM.     .

## 2024-02-10 NOTE — PLAN OF CARE
02/10/24 0953   Post-Acute Status   Post-Acute Authorization Placement   Post-Acute Placement Status Pending post-acute provider review/more information requested  (Spoke with pt. She reports she does not want to go to a NH. Encouraged pt to speak with spouse. Spouse reports he is only physical caregiver but still having frequent falls. Reports nightly he picks pt up off of the floor.)     Barrier to placement:  Weekend admission, Holiday on Tues, Traffic delays due to parades. Noted pt in OBS. Recently DC'd from  due as she was due to start Outpt therapy for Lymphedema therapy. Pt was in [facility] program already.

## 2024-02-10 NOTE — PLAN OF CARE
Problem: Occupational Therapy  Goal: Occupational Therapy Goal  Description: Goals to be met by: 03/11/24     Patient will increase functional independence with ADLs by performing:    UE Dressing with Modified [place].  Grooming while seated at sink with Modified [place].  Upper extremity exercise program 3x15 reps per handout, with assistance as needed.    Outcome: Ongoing, Progressing     Pt was agreeable to and participated in OT/PT evaluation.  Pt lives with her  and reports that she has been having a lot of fall lately and broken toes on B feet.  Pt also states that her  has been helping her move with ADLs due to LE edema.  Pt completed evaluation and performed ADLS with set-up -total A and func mobility with min - max x2 A for bed mobility.  Standing/transfers not attempted due to pt report of fx toes in order to discuss WB limitations with MD. Goals established to assist pt with returning to PLOF regarding ADLs and func mobility.  Pt will benefit from skilled OT services in order to increase her level of safety and independence with ADLs and mobility.      Lisa Cartwright, OT  2/10/2024

## 2024-02-10 NOTE — ASSESSMENT & PLAN NOTE
Frequent falls over the past few weeks  Has been unable to take care of the patient    Plan:  PT/OT  Considering placement

## 2024-02-10 NOTE — ASSESSMENT & PLAN NOTE
Significant lymphedema in bilateral extremities  Blistering noted on both    IV diuresis (dose lowered due to hypotension)  Wound care

## 2024-02-10 NOTE — ASSESSMENT & PLAN NOTE
Pt has a SC dilaudid pump managed by pain clinic, Dr. Hillman    Pt requesting for IV dilaudid. Discussed with refrain from parenteral narcotics given the SC dilaudid pump.   Pt in agreement with low dose PRN oxycodone, tylenol for breakthrough pain

## 2024-02-10 NOTE — ASSESSMENT & PLAN NOTE
Suprapubic catheter will need to be replaced shortly as per  discussion    Plan:  Consult Urology for possible bedside exchange

## 2024-02-10 NOTE — PLAN OF CARE
AAO x4. Cardiac diet continued. Complaints of back pain. Pt denies N/V, SOB, distress. Medications given per MAR. Vital signs stable throughout the night. On 3L NC. Slept most of the night. Weight shifting assistance provided. 4+ edema on BLE, redness noted. Safety precautions maintained. Bed alarm set. Call bell within reach. Patient encouraged to call for assistance.

## 2024-02-10 NOTE — ASSESSMENT & PLAN NOTE
Body mass index is 37.67 kg/m². Morbid obesity complicates all aspects of disease management from diagnostic modalities to treatment. Weight loss encouraged and health benefits explained to patient.

## 2024-02-10 NOTE — PLAN OF CARE
Problem: Physical Therapy  Goal: Physical Therapy Goal  Description: Goals to be met by: 3/10/2024    Patient will increase functional independence with mobility by performin. Bed mobility with Mod independence  2. Supine<>sit with Mod assist.       2/10/2024 1605 by Matt Roger, PT  Outcome: Ongoing, Progressing   Orders received and Physical Therapy evaluation completed.  Pt required Max assist of 2 to transfer supine<>sit.  She would attempt to stand secondary to reported fractures in B feet.  Recommendation: Moderate Intensity Therapy.

## 2024-02-10 NOTE — ED NOTES
Notified by spouse that pt's personal oxygen tank not in rm.  Spouse states he has not seen it since pt went to radiology.   Called Radiology department and they are looking into the missing tank.

## 2024-02-10 NOTE — SUBJECTIVE & OBJECTIVE
Past Medical History:   Diagnosis Date    Anxiety     Arthritis     Bilateral lower extremity edema     severe chronic    Carotid artery occlusion     Cataract     CHF (congestive heart failure)     Coronary artery disease     subtotalled LAD with collateral    Depression     Fever blister     Hard of hearing     Hypokalemia 01/09/2023    Hyponatremia 02/04/2022    Hypothyroid     Iron deficiency anemia     Lumbar radiculopathy     with chronic pain    Ocular migraine     Other emphysema 05/22/2023    Renal disorder     Sleep apnea     cpap       Past Surgical History:   Procedure Laterality Date    ADENOIDECTOMY      BACK SURGERY      x 12    CARDIAC CATHETERIZATION  2016    subtotalled LAD with right to left collaterals    CATARACT EXTRACTION W/  INTRAOCULAR LENS IMPLANT Left     Dr Coleman     CYSTOSCOPIC LITHOLAPAXY N/A 6/27/2019    Procedure: CYSTOLITHOLAPAXY;  Surgeon: Shireen Mayo MD;  Location: [facility] OR 2ND FLR;  Service: Urology;  Laterality: N/A;    CYSTOSCOPIC LITHOLAPAXY N/A 9/3/2019    Procedure: CYSTOLITHOLAPAXY;  Surgeon: Shireen Mayo MD;  Location: [facility] OR 2ND FLR;  Service: Urology;  Laterality: N/A;    CYSTOSCOPY N/A 7/13/2021    Procedure: CYSTOSCOPY;  Surgeon: Shireen Mayo MD;  Location: [facility] OR 1ST FLR;  Service: Urology;  Laterality: N/A;    CYSTOSCOPY  11/16/2021    Procedure: CYSTOSCOPY;  Surgeon: Shireen Mayo MD;  Location: [facility] OR 1ST FLR;  Service: Urology;;    CYSTOSCOPY  7/19/2022    Procedure: CYSTOSCOPY;  Surgeon: Shireen Mayo MD;  Location: [facility] OR 1ST FLR;  Service: Urology;;    CYSTOSCOPY WITH INJECTION OF PERIURETHRAL BULKING AGENT  7/19/2022    Procedure: CYSTOSCOPY, WITH PERIURETHRAL BULKING AGENT INJECTION-MACROPLASTIQUE;  Surgeon: Shireen Mayo MD;  Location: [facility] OR 1ST FLR;  Service: Urology;;    CYSTOSCOPY WITH INJECTION OF PERIURETHRAL BULKING AGENT N/A 3/28/2023    Procedure: CYSTOSCOPY, WITH PERIURETHRAL BULKING AGENT INJECTION;  Surgeon:  Shireen Mayo MD;  [facility];  Service: Urology;  Laterality: N/A;  [place]    CYSTOSCOPY WITH INJECTION OF PERIURETHRAL BULKING AGENT N/A 11/14/2023    Procedure: CYSTOSCOPY, WITH PERIURETHRAL BULKING AGENT INJECTION;  Surgeon: Shireen Mayo MD;  [facility];  Service: Urology;  Laterality: N/A;    CYSTOSCOPY,WITH BOTULINUM TOXIN INJECTION N/A 12/13/2022    Procedure: CYSTOSCOPY,WITH BOTULINUM TOXIN INJECTION;  Surgeon: Shireen Mayo MD;  [facility];  Service: Urology;  Laterality: N/A;  300 U    CYSTOSCOPY,WITH BOTULINUM TOXIN INJECTION N/A 3/28/2023    Procedure: CYSTOSCOPY,WITH BOTULINUM TOXIN INJECTION;  Surgeon: Shireen Mayo MD;  [facility];  Service: Urology;  Laterality: N/A;  45 MIN.    300 UNITS    ESOPHAGOGASTRODUODENOSCOPY N/A 5/23/2018    Procedure: ESOPHAGOGASTRODUODENOSCOPY (EGD);  Surgeon: Prince Vance MD;  Location: [facility] (4TH FLR);  Service: Endoscopy;  Laterality: N/A;  r/s 'd per Dr. Vance due to family emergency- ER    ESOPHAGOGASTRODUODENOSCOPY N/A 6/23/2023    Procedure: EGD (ESOPHAGOGASTRODUODENOSCOPY);  Surgeon: Juaquin Barry MD;  Location: [facility] ENDO (2ND FLR);  Service: Endoscopy;  Laterality: N/A;  Refferal: PRINCE VANCE  Order  tele [facility] 5/18/23  dx: history of a Nissen fundoplication  Additional Scheduling Information:  On home oxygen 2nd floor for airway protection also with a pain pump elevated BMI close to 40   Prep Gastroparesis   ins    HYSTERECTOMY  1975    endometriosis    INJECTION OF BOTULINUM TOXIN TYPE A  7/13/2021    Procedure: INJECTION, BOTULINUM TOXIN, 200units;  Surgeon: Shireen Mayo MD;  [facility];  Service: Urology;;    INJECTION OF BOTULINUM TOXIN TYPE A  11/16/2021    Procedure: INJECTION, BOTULINUM TOXIN, 200units;  Surgeon: Shireen Mayo MD;  Location: [facility] OR [address] [place] [address];  Service: Urology;;    INJECTION OF BOTULINUM TOXIN TYPE A  7/19/2022     Procedure: INJECTION, BOTULINUM TOXIN, 300 units ;  Surgeon: Shireen Mayo MD;  [facility];  Service: Urology;;    INJECTION OF BOTULINUM TOXIN TYPE A N/A 11/14/2023    Procedure: INJECTION, BOTULINUM TOXIN, TYPE A;  Surgeon: Shireen Mayo MD;  [facility];  Service: Urology;  Laterality: N/A;    INSERTION, SUPRAPUBIC CATHETER N/A 12/13/2022    Procedure: INSERTION, SUPRAPUBIC CATHETER;  Surgeon: Shireen Mayo MD;  [facility];  Service: Urology;  Laterality: N/A;  exchange    INSERTION, SUPRAPUBIC CATHETER N/A 11/14/2023    Procedure: INSERTION, SUPRAPUBIC CATHETER;  Surgeon: Shireen Mayo MD;  [facility];  Service: Urology;  Laterality: N/A;  EXCHANGE of s/p tube    pain pump placement      SQ Dilaudid Pump managed by Dr. Hillman, Pain Management    REMOVAL OF BONE SPUR OF FOOT Bilateral 9/16/2022    Procedure: EXCISION ARTHRITIC BONE, BILATERAL FOOT;  Surgeon: NICOLA Mcgrath;  Location: [facility] OR;  Service: Podiatry;  Laterality: Bilateral;    REPLACEMENT OF BACLOFEN PUMP N/A 8/2/2023    Procedure: REPLACEMENT, BACLOFEN PUMP;  Surgeon: Smitha Condon MD;  [facility];  Service: Neurosurgery;  Laterality: N/A;  Anes: Gen  Blood: Type & Screen  Pos: Left Lat  Intrathecal Pump  Bed: Reg Reversed  Rad: C-arm  Pump: 40 cc, [employer]tronics    REPLACEMENT OF CATHETER N/A 10/31/2019    Procedure: REPLACEMENT, CATHETER-SUPRAPUBIC;  Surgeon: Shireen Mayo MD;  [facility];  Service: Urology;  Laterality: N/A;    SPINAL CORD STIMULATOR REMOVAL      before [place]    SPINE SURGERY  5-13-13    CERVICAL FUSION    TONSILLECTOMY         Review of patient's allergies indicates:   Allergen Reactions    (d)-limonene flavor      Other reaction(s): difficult intubation  Other reaction(s): Difficulty breathing    Benadryl [diphenhydramine hcl] Shortness Of Breath    Fentanyl Itching, Nausea And Vomiting and Swelling              Imitrex [sumatriptan succinate] Shortness Of Breath    Oxycodone-acetaminophen Shortness Of Breath    Sulfamethoxazole-trimethoprim Anaphylaxis    Topiramate Shortness Of Breath    Vancomycin Anaphylaxis     Rash    Butorphanol tartrate     Evening primrose (oenothera biennis)     Latex     Pregabalin Other (See Comments)     tremors    Propoxyphene n-acetaminophen      Other reaction(s): Difficulty breathing    Sumatriptan     White petrolatum-zinc     Zinc acetate     Zinc oxide-white petrolatum      Other reaction(s): Difficulty breathing    Latex, natural rubber Itching and Rash    Phenytoin Rash and Other (See Comments)     Trouble breathing       No current facility-administered medications on file prior to encounter.     Current Outpatient Medications on File Prior to Encounter   Medication Sig    amitriptyline (ELAVIL) 25 MG tablet Take 25 mg by mouth every evening.    butalbital-acetaminophen-caffeine -40 mg (FIORICET, ESGIC) -40 mg per tablet Take 1 tablet by mouth daily as needed.    darifenacin (ENABLEX) 7.5 MG Tb24 TAKE ONE TABLET BY MOUTH EVERY DAY (Patient taking differently: Take 7.5 mg by mouth once daily.)    DULoxetine (CYMBALTA) 60 MG capsule Take 1 capsule (60 mg total) by mouth 2 (two) times daily.    fludrocortisone (FLORINEF) 0.1 mg Tab Take a half-tablet (50 mcg) by mouth once daily    furosemide (LASIX) 40 MG tablet Take 2 tablets (80 mg total) by mouth 2 (two) times a day.    HYDROcodone-acetaminophen (NORCO)  mg per tablet Take 1 tablet by mouth every 6 (six) hours as needed.    levothyroxine (SYNTHROID) 150 MCG tablet Take 1 tablet (150 mcg total) by mouth before breakfast.    LIDOcaine (LIDODERM) 5 % Place 1 patch onto the skin once daily. Remove & Discard patch within 12 hours or as directed by MD    pantoprazole (PROTONIX) 40 MG tablet Take 1 tablet (40 mg total) by mouth once daily.    triamcinolone acetonide 0.1% (KENALOG) 0.1 % cream Apply topically 2  (two) times daily.    aspirin (ECOTRIN) 81 MG EC tablet Take 1 tablet (81 mg total) by mouth once daily.    atorvastatin (LIPITOR) 80 MG tablet Take 1 tablet (80 mg total) by mouth once daily. (Patient not taking: Reported on 2/9/2024)    ciprofloxacin HCl (CIPRO) 500 MG tablet Take 1 tablet by mouth 2 (two) times daily.    cyanocobalamin, vitamin B-12, 5,000 mcg Subl Place 1 tablet under the tongue once daily.    diclofenac sodium (VOLTAREN ARTHRITIS PAIN) 1 % Gel Apply 4 g topically 4 (four) times daily. (Patient not taking: Reported on 2/9/2024)    docusate sodium (COLACE) 100 MG capsule Take 200 mg by mouth every evening.    fluticasone propionate (FLONASE) 50 mcg/actuation nasal spray 2 sprays (100 mcg total) by Each Nostril route once daily.    hydrocortisone (CORTEF) 10 MG Tab Take 1 tablet (10 mg total) by mouth every evening. (Patient not taking: Reported on 2/9/2024)    hydrOXYzine (ATARAX) 50 MG tablet Take 1 tablet (50 mg total) by mouth every 4 (four) hours as needed for Anxiety. (Patient not taking: Reported on 2/9/2024)    intrathecal pain pump compound Hydromorphone (7.5 mg/mL) infusion at 8.59 mg/day (0.3578 mg/hr) out of a total reservoir volume of 40 mL  Pump filled monthly    ketorolac (TORADOL) 10 mg tablet Take 10 mg by mouth daily as needed.    liothyronine (CYTOMEL) 5 MCG Tab Take 1 tablet (5 mcg total) by mouth once daily. (Patient not taking: Reported on 2/9/2024)    nitroGLYCERIN (NITROSTAT) 0.4 MG SL tablet Place 0.4 mg under the tongue every 5 (five) minutes as needed for Chest pain.    nystatin (MYCOSTATIN) powder Apply topically 4 (four) times daily. (Patient not taking: Reported on 2/9/2024)    ondansetron (ZOFRAN-ODT) 4 MG TbDL Take 4 mg by mouth every 4 to 6 hours as needed.    potassium chloride (MICRO-K) 10 MEQ CpSR Take 2 capsules (20 mEq total) by mouth once daily.    promethazine (PHENERGAN) 25 MG tablet Take 25 mg by mouth every 6 (six) hours as needed for Nausea.     "QUEtiapine (SEROQUEL) 100 MG Tab TAKE 2 TABLETS (200 MG) BY MOUTH NIGHTLY    senna (SENNA LAX) 8.6 mg tablet Take 2 tablets by mouth 2 (two) times daily.    tiotropium bromide (SPIRIVA RESPIMAT) 2.5 mcg/actuation inhaler Inhale 2 puffs into the lungs once daily. Controller (Patient not taking: Reported on 2/9/2024)    traMADoL (ULTRAM) 50 mg tablet Take 1 tablet (50 mg total) by mouth every 4 (four) hours as needed for Pain.    traZODone (DESYREL) 300 MG tablet Take 1 tablet (300 mg total) by mouth every evening.     Family History       Problem Relation (Age of Onset)    Cancer Mother (55), Father    Cirrhosis Paternal Aunt, Paternal Uncle    Colon cancer Maternal Uncle    Esophageal cancer Father    Heart disease Maternal Uncle    Liver disease Paternal Aunt, Paternal Uncle    No Known Problems Brother, Brother, Sister, Maternal Aunt, Maternal Grandfather, Paternal Grandmother, Paternal Grandfather    Parkinsonism Maternal Grandmother    Tremor Maternal Grandmother          Tobacco Use    Smoking status: Never    Smokeless tobacco: Never   Substance and Sexual Activity    Alcohol use: Never    Drug use: Yes     Types: Marijuana     Comment: "medical marijuana gummies"    Sexual activity: Not on file     Review of Systems   Constitutional:  Positive for activity change and fatigue. Negative for appetite change and chills.   HENT:  Negative for ear discharge, ear pain, postnasal drip and rhinorrhea.    Eyes:  Negative for pain and redness.   Respiratory:  Negative for cough and choking.    Cardiovascular:  Positive for leg swelling. Negative for chest pain.   Gastrointestinal:  Negative for anal bleeding and blood in stool.   Endocrine: Negative for polydipsia and polyphagia.   Genitourinary:  Negative for decreased urine volume, dysuria, enuresis, flank pain, hematuria and urgency.   Musculoskeletal:  Positive for gait problem. Negative for back pain.   Skin:  Negative for pallor and rash.   Allergic/Immunologic: " Negative for food allergies and immunocompromised state.   Neurological:  Positive for weakness. Negative for seizures and speech difficulty.   Hematological:  Negative for adenopathy. Does not bruise/bleed easily.   Psychiatric/Behavioral:  Negative for decreased concentration and dysphoric mood.      Objective:     Vital Signs (Most Recent):  Temp: 97.9 °F (36.6 °C) (02/09/24 2025)  Pulse: 71 (02/09/24 2025)  Resp: 16 (02/09/24 2025)  BP: (!) 111/56 (02/09/24 2025)  SpO2: 98 % (02/09/24 2025) Vital Signs (24h Range):  Temp:  [97.3 °F (36.3 °C)-98.1 °F (36.7 °C)] 97.9 °F (36.6 °C)  Pulse:  [66-71] 71  Resp:  [13-27] 16  SpO2:  [92 %-100 %] 98 %  BP: ()/(51-60) 111/56     Weight: 109.1 kg (240 lb 8.4 oz)  Body mass index is 37.67 kg/m².     Physical Exam  Vitals reviewed.   Constitutional:       General: She is not in acute distress.     Appearance: She is obese. She is ill-appearing. She is not toxic-appearing.   HENT:      Head: Normocephalic and atraumatic.      Right Ear: There is no impacted cerumen.      Left Ear: There is no impacted cerumen.      Nose: No congestion or rhinorrhea.      Mouth/Throat:      Pharynx: No oropharyngeal exudate or posterior oropharyngeal erythema.   Eyes:      General:         Right eye: No discharge.         Left eye: No discharge.   Cardiovascular:      Rate and Rhythm: Normal rate.      Heart sounds: No murmur heard.     No gallop.   Pulmonary:      Breath sounds: No wheezing or rales.   Abdominal:      General: There is no distension.      Tenderness: There is no abdominal tenderness.   Genitourinary:     Comments: Suprapubic tube in place  Musculoskeletal:         General: No swelling or deformity.      Cervical back: No rigidity.      Right lower leg: Edema present.      Left lower leg: Edema present.   Lymphadenopathy:      Cervical: No cervical adenopathy.   Skin:     Coloration: Skin is not jaundiced.      Findings: No bruising.   Neurological:      General: No focal  "deficit present.      Mental Status: She is alert.      Cranial Nerves: No cranial nerve deficit.      Motor: No weakness.   Psychiatric:         Mood and Affect: Mood normal.                Significant Labs: All pertinent labs within the past 24 hours have been reviewed.  Blood Culture: No results for input(s): "LABBLOO" in the last 48 hours.  BMP:   Recent Labs   Lab 02/09/24  1605   GLU 99      K 3.7      CO2 33*   BUN 7*   CREATININE 0.9   CALCIUM 8.6*     CBC:   Recent Labs   Lab 02/09/24  1605   WBC 5.25   HGB 11.4*   HCT 36.1*        CMP:   Recent Labs   Lab 02/09/24  1605      K 3.7      CO2 33*   GLU 99   BUN 7*   CREATININE 0.9   CALCIUM 8.6*   PROT 7.6   ALBUMIN 3.3*   BILITOT 0.4   ALKPHOS 160*   AST 16   ALT 10   ANIONGAP 8     Lipid Panel: No results for input(s): "CHOL", "HDL", "LDLCALC", "TRIG", "CHOLHDL" in the last 48 hours.  Troponin:   Recent Labs   Lab 02/09/24  1605   TROPONINI <0.006     TSH:   Recent Labs   Lab 01/05/24  1152   TSH 24.599*     Urine Culture: No results for input(s): "LABURIN" in the last 48 hours.    Significant Imaging: I have reviewed all pertinent imaging results/findings within the past 24 hours.  I have reviewed and interpreted all pertinent imaging results/findings within the past 24 hours.  "

## 2024-02-10 NOTE — ASSESSMENT & PLAN NOTE
Patient's anemia is currently controlled. Has not received any PRBCs to date. Etiology likely d/t Iron deficiency  Current CBC reviewed-   Lab Results   Component Value Date    HGB 10.5 (L) 02/10/2024    HCT 34.5 (L) 02/10/2024     Monitor serial CBC and transfuse if patient becomes hemodynamically unstable, symptomatic or H/H drops below 7/21.

## 2024-02-10 NOTE — PROGRESS NOTES
[facility] Medicine  Progress Note    Patient Name: Tasha Hawley  MRN: [id number]  Patient Class: OP- Observation   Admission Date: 2/9/2024  Length of Stay: 0 days  Attending Physician: Bharti Sullivan MD  Primary Care Provider: Mesfin Hodges II, MD        Subjective:     Principal Problem:Frequent falls        HPI:  Tasha Hawley is a 68-year-old female with past medical history of chronic lymphedema, heart failure, depression who presents due to worsening lower extremity swelling, leaking from indwelling catheter, and repeated falls per .     is at bedside and provides much of the history.  He reports that over the past few weeks, his wife has been having multiple falls due to unsteady gait partly due to her worsening lower extremity edema.  He reports despite taking the oral Lasix, patient has lower extremity edema,which has worsened with blistering occurring.  Also reports some chest pain with shortness of breath.  He wishes for possible temporary placement due to difficulty taking care of her.    He also was concerned that she has not had replacement of her indwelling suprapubic catheter.  He reports consistent leaking and wishes to get that addressed.  Patient presents for further evaluation.    Triage vitals were significant for hypoxemia.  Review of systems significant for leg swelling.  Physical exam significant for lower extremity swelling and suprapubic catheter without significant erythema in surrounding skin.    CBC significant for normocytic anemia.  CMP significant for elevated ALP.  BNP and troponin negative.  UA with bacteria and WBCs.  Urine culture pending.    CT head without contrast is negative for any acute abnormality.    Chest x-ray concerning for hiatal hernia but no obvious cardiopulmonary infectious etiology noted.    Patient admitted for falls, UA, diuresis and lower extremity wound management.      Overview/Hospital Course:  No notes on  file    Interval History: c/o bilateral leg pain & swelling. Unaware of when suprapubic catheter was last exchanged but thinks it is due.     Review of Systems  Objective:     Vital Signs (Most Recent):  Temp: 97.9 °F (36.6 °C) (02/10/24 1140)  Pulse: 70 (02/10/24 1140)  Resp: 18 (02/10/24 1140)  BP: (!) 98/54 (02/10/24 1140)  SpO2: 97 % (02/10/24 1140) Vital Signs (24h Range):  Temp:  [97.8 °F (36.6 °C)-98.1 °F (36.7 °C)] 97.9 °F (36.6 °C)  Pulse:  [65-73] 70  Resp:  [13-27] 18  SpO2:  [93 %-100 %] 97 %  BP: ()/(51-56) 98/54     Weight: 109.1 kg (240 lb 8.4 oz)  Body mass index is 37.67 kg/m².    Intake/Output Summary (Last 24 hours) at 2/10/2024 1429  Last data filed at 2/10/2024 0635  Gross per 24 hour   Intake 96.66 ml   Output 1700 ml   Net -1603.34 ml         Physical Exam  Vitals and nursing note reviewed.   Constitutional:       General: She is not in acute distress.  Cardiovascular:      Rate and Rhythm: Normal rate and regular rhythm.   Pulmonary:      Effort: Pulmonary effort is normal. No respiratory distress.   Abdominal:      Palpations: Abdomen is soft.      Tenderness: There is no abdominal tenderness.   Genitourinary:     Comments: Suprapubic catheter in place  Musculoskeletal:      Right lower leg: Edema present.      Left lower leg: Edema present.   Skin:     General: Skin is warm and dry.      Comments: Erythematous b/l legs   Neurological:      General: No focal deficit present.      Mental Status: She is alert and oriented to person, place, and time.   Psychiatric:         Mood and Affect: Mood normal.             Significant Labs: All pertinent labs within the past 24 hours have been reviewed.    Significant Imaging: I have reviewed all pertinent imaging results/findings within the past 24 hours.    Assessment/Plan:      * Frequent falls  Frequent falls over the past few weeks  Has been unable to take care of the patient  Patient known to me from the past - very reluctant to placement &  typically requests discharge home in the past    Plan:  PT/OT consult - prior h/o metatarsal fractures. bilateral foot xray ordered to determine WB status, consider ortho consult  Case management consult to assist with placement    Chronic hypoxic respiratory failure  Patient with Hypercapnic and Hypoxic Respiratory failure which is Chronic.  she is on home oxygen at 2-3 LPM.     .       Adrenal insufficiency  Continue with the patient's home regimen      Normocytic anemia  Patient's anemia is currently controlled. Has not received any PRBCs to date. Etiology likely d/t Iron deficiency  Current CBC reviewed-   Lab Results   Component Value Date    HGB 10.5 (L) 02/10/2024    HCT 34.5 (L) 02/10/2024     Monitor serial CBC and transfuse if patient becomes hemodynamically unstable, symptomatic or H/H drops below 7/21.    UTI (urinary tract infection)  UA with pyuria  Possible colonization due to suprapubic catheter but pt reports weakness which she usually has with UTIs    Continue ceftriaxone pending culture  Urology consult for suprapubic catheter exchange. Urology not on call this weekend, will likely get catheter exchanged on Monday       Suprapubic catheter  noted      Lymphedema of both lower extremities  Significant lymphedema in bilateral extremities  Blistering noted on both    IV diuresis (dose lowered due to hypotension)  Wound care      Hypothyroidism (acquired)  Unclear patient was taking both Synthroid and Cytomel  Continue home regimen    Severe obesity (BMI 35.0-39.9) with comorbidity  Body mass index is 37.67 kg/m². Morbid obesity complicates all aspects of disease management from diagnostic modalities to treatment. Weight loss encouraged and health benefits explained to patient.         Narcotic dependency, continuous  Pt has a SC dilaudid pump managed by pain clinic, Dr. Hillman    Pt requesting for IV dilaudid. Discussed with refrain from parenteral narcotics given the SC dilaudid pump.   Pt in  agreement with low dose PRN oxycodone, tylenol for breakthrough pain      Generalized anxiety disorder  Continue with patient's home medications        VTE Risk Mitigation (From admission, onward)           Ordered     heparin (porcine) injection 5,000 Units  Every 8 hours         02/09/24 1811     IP VTE HIGH RISK PATIENT  Once         02/09/24 1811     Place sequential compression device  Until discontinued         02/09/24 1811                    Discharge Planning   JACKIE:      Code Status: Full Code   Is the patient medically ready for discharge?:     Reason for patient still in hospital (select all that apply): Patient trending condition and Treatment             Tasha Hawley has warranted treatment spanning two or more midnights of hospital level care for the management of  lower extremity edema/ lymphedema, UTI . She continues to require  IV diuresis . Her condition is also complicated by the following comorbidities: Chronic respiratory disease, Obesity, and Adrenal insufficiency, chronic pain, frequent falls .      Bharti Sullivan MD  Department of Hospital Medicine   [facility]

## 2024-02-10 NOTE — PHARMACY MED REC
"[facility]           Pharmacy  Admission Medication History     The home medication history was taken by Marisel Cruz.      Medication history obtained from ;unable to assess patient    Based on information gathered for medication list, you may go to "Admission" then "Reconcile Home Medications" tabs to review and/or act upon those items.     The home medication list has been updated by the Pharmacy department.   Please read ALL comments highlighted in yellow.   Please address this information as you see fit.    Feel free to contact us if you have any questions or require assistance.        No current facility-administered medications on file prior to encounter.     Current Outpatient Medications on File Prior to Encounter   Medication Sig Dispense Refill    amitriptyline (ELAVIL) 25 MG tablet Take 25 mg by mouth every evening.      butalbital-acetaminophen-caffeine -40 mg (FIORICET, ESGIC) -40 mg per tablet Take 1 tablet by mouth daily as needed.      darifenacin (ENABLEX) 7.5 MG Tb24 TAKE ONE TABLET BY MOUTH EVERY DAY (Patient taking differently: Take 7.5 mg by mouth once daily.) 30 tablet 11    DULoxetine (CYMBALTA) 60 MG capsule Take 1 capsule (60 mg total) by mouth 2 (two) times daily. 60 capsule 11    fludrocortisone (FLORINEF) 0.1 mg Tab Take a half-tablet (50 mcg) by mouth once daily 15 tablet 5    furosemide (LASIX) 40 MG tablet Take 2 tablets (80 mg total) by mouth 2 (two) times a day. 360 tablet 3    HYDROcodone-acetaminophen (NORCO)  mg per tablet Take 1 tablet by mouth every 6 (six) hours as needed. 30 tablet 0    levothyroxine (SYNTHROID) 150 MCG tablet Take 1 tablet (150 mcg total) by mouth before breakfast. 90 tablet 3    LIDOcaine (LIDODERM) 5 % Place 1 patch onto the skin once daily. Remove & Discard patch within 12 hours or as directed by MD  0    pantoprazole (PROTONIX) 40 MG tablet Take 1 tablet (40 mg total) by mouth once daily. 90 tablet 3    " triamcinolone acetonide 0.1% (KENALOG) 0.1 % cream Apply topically 2 (two) times daily. 80 g 11    aspirin (ECOTRIN) 81 MG EC tablet Take 1 tablet (81 mg total) by mouth once daily. 90 tablet 3    atorvastatin (LIPITOR) 80 MG tablet Take 1 tablet (80 mg total) by mouth once daily. (Patient not taking: Reported on 2/9/2024) 90 tablet 3    ciprofloxacin HCl (CIPRO) 500 MG tablet Take 1 tablet by mouth 2 (two) times daily.      cyanocobalamin, vitamin B-12, 5,000 mcg Subl Place 1 tablet under the tongue once daily.      diclofenac sodium (VOLTAREN ARTHRITIS PAIN) 1 % Gel Apply 4 g topically 4 (four) times daily. (Patient not taking: Reported on 2/9/2024) 150 g 5    docusate sodium (COLACE) 100 MG capsule Take 200 mg by mouth every evening.      fluticasone propionate (FLONASE) 50 mcg/actuation nasal spray 2 sprays (100 mcg total) by Each Nostril route once daily. 16 g 0    hydrocortisone (CORTEF) 10 MG Tab Take 1 tablet (10 mg total) by mouth every evening. (Patient not taking: Reported on 2/9/2024) 30 tablet 5    hydrOXYzine (ATARAX) 50 MG tablet Take 1 tablet (50 mg total) by mouth every 4 (four) hours as needed for Anxiety. (Patient not taking: Reported on 2/9/2024) 30 tablet 0    intrathecal pain pump compound Hydromorphone (7.5 mg/mL) infusion at 8.59 mg/day (0.3578 mg/hr) out of a total reservoir volume of 40 mL  Pump filled monthly      ketorolac (TORADOL) 10 mg tablet Take 10 mg by mouth daily as needed.      liothyronine (CYTOMEL) 5 MCG Tab Take 1 tablet (5 mcg total) by mouth once daily. (Patient not taking: Reported on 2/9/2024) 30 tablet 11    nitroGLYCERIN (NITROSTAT) 0.4 MG SL tablet Place 0.4 mg under the tongue every 5 (five) minutes as needed for Chest pain.      nystatin (MYCOSTATIN) powder Apply topically 4 (four) times daily. (Patient not taking: Reported on 2/9/2024) 60 g 0    ondansetron (ZOFRAN-ODT) 4 MG TbDL Take 4 mg by mouth every 4 to 6 hours as needed.      potassium chloride (MICRO-K) 10  MEQ CpSR Take 2 capsules (20 mEq total) by mouth once daily. 60 capsule 11    promethazine (PHENERGAN) 25 MG tablet Take 25 mg by mouth every 6 (six) hours as needed for Nausea.      QUEtiapine (SEROQUEL) 100 MG Tab TAKE 2 TABLETS (200 MG) BY MOUTH NIGHTLY 180 tablet 3    senna (SENNA LAX) 8.6 mg tablet Take 2 tablets by mouth 2 (two) times daily.      tiotropium bromide (SPIRIVA RESPIMAT) 2.5 mcg/actuation inhaler Inhale 2 puffs into the lungs once daily. Controller (Patient not taking: Reported on 2/9/2024) 4 g 1    traMADoL (ULTRAM) 50 mg tablet Take 1 tablet (50 mg total) by mouth every 4 (four) hours as needed for Pain. 30 tablet 0    traZODone (DESYREL) 300 MG tablet Take 1 tablet (300 mg total) by mouth every evening. 90 tablet 3       Please address this information as you see fit.  Feel free to contact us if you have any questions or require assistance.    Marisel Cruz  [phone]                  .

## 2024-02-10 NOTE — H&P
[facility] Medicine  History & Physical    Patient Name: Tasha Hawley  MRN: [id number]  Patient Class: OP- Observation  Admission Date: 2/9/2024  Attending Physician: Justyna Hinton*   Primary Care Provider: Mesfin Hodges II, MD         Patient information was obtained from patient and ER records.     Subjective:     Principal Problem:Frequent falls    Chief Complaint:   Chief Complaint   Patient presents with    Leg Pain     Pt complains of increased swelling to bilateral lower leg edema with redness and blisters, pt also reports blisters, pt alos reports CP that is intermittent with SOB, hx of CHF,     Female  Problem     Pt reports leaking from indwelling cath     Fall     Pt reports requests falls, last fall last night with + LOC reported         HPI: Tasha Hawley is a 68-year-old female with past medical history of chronic lymphedema, heart failure, depression who presents due to worsening lower extremity swelling, leaking from indwelling catheter, and repeated falls per .     is at bedside and provides much of the history.  He reports that over the past few weeks, his wife has been having multiple falls due to unsteady gait partly due to her worsening lower extremity edema.  He reports despite taking the oral Lasix, patient has lower extremity edema,which has worsened with blistering occurring.  Also reports some chest pain with shortness of breath.  He wishes for possible temporary placement due to difficulty taking care of her.    He also was concerned that she has not had replacement of her indwelling suprapubic catheter.  He reports consistent leaking and wishes to get that addressed.  Patient presents for further evaluation.    Triage vitals were significant for hypoxemia.  Review of systems significant for leg swelling.  Physical exam significant for lower extremity swelling and suprapubic catheter without significant erythema in surrounding  skin.    CBC significant for normocytic anemia.  CMP significant for elevated ALP.  BNP and troponin negative.  UA with bacteria and WBCs.  Urine culture pending.    CT head without contrast is negative for any acute abnormality.    Chest x-ray concerning for hiatal hernia but no obvious cardiopulmonary infectious etiology noted.    Patient admitted for falls, UA, diuresis and lower extremity wound management.      Past Medical History:   Diagnosis Date    Anxiety     Arthritis     Bilateral lower extremity edema     severe chronic    Carotid artery occlusion     Cataract     CHF (congestive heart failure)     Coronary artery disease     subtotalled LAD with collateral    Depression     Fever blister     Hard of hearing     Hypokalemia 01/09/2023    Hyponatremia 02/04/2022    Hypothyroid     Iron deficiency anemia     Lumbar radiculopathy     with chronic pain    Ocular migraine     Other emphysema 05/22/2023    Renal disorder     Sleep apnea     cpap       Past Surgical History:   Procedure Laterality Date    ADENOIDECTOMY      BACK SURGERY      x 12    CARDIAC CATHETERIZATION  2016    subtotalled LAD with right to left collaterals    CATARACT EXTRACTION W/  INTRAOCULAR LENS IMPLANT Left     Dr Coleman     CYSTOSCOPIC LITHOLAPAXY N/A 6/27/2019    Procedure: CYSTOLITHOLAPAXY;  Surgeon: Shireen Mayo MD;  Location: [address];  Service: Urology;  Laterality: N/A;    CYSTOSCOPIC LITHOLAPAXY N/A 9/3/2019    Procedure: CYSTOLITHOLAPAXY;  Surgeon: Shireen Mayo MD;  Location: [facility] OR [address] [place] [address];  Service: Urology;  Laterality: N/A;    CYSTOSCOPY N/A 7/13/2021    Procedure: CYSTOSCOPY;  Surgeon: Shireen Mayo MD;  Location: [address];  Service: Urology;  Laterality: N/A;    CYSTOSCOPY  11/16/2021    Procedure: CYSTOSCOPY;  Surgeon: Shireen Mayo MD;  Location: [address];  Service: Urology;;    CYSTOSCOPY  7/19/2022    Procedure: CYSTOSCOPY;  Surgeon: Shireen Mayo MD;  Location: [facility] OR  1ST FLR;  Service: Urology;;    CYSTOSCOPY WITH INJECTION OF PERIURETHRAL BULKING AGENT  7/19/2022    Procedure: CYSTOSCOPY, WITH PERIURETHRAL BULKING AGENT INJECTION-MACROPLASTIQUE;  Surgeon: Shireen Mayo MD;  [facility];  Service: Urology;;    CYSTOSCOPY WITH INJECTION OF PERIURETHRAL BULKING AGENT N/A 3/28/2023    Procedure: CYSTOSCOPY, WITH PERIURETHRAL BULKING AGENT INJECTION;  Surgeon: Shireen Mayo MD;  [facility];  Service: Urology;  Laterality: N/A;  [place]    CYSTOSCOPY WITH INJECTION OF PERIURETHRAL BULKING AGENT N/A 11/14/2023    Procedure: CYSTOSCOPY, WITH PERIURETHRAL BULKING AGENT INJECTION;  Surgeon: Shireen Mayo MD;  [facility];  Service: Urology;  Laterality: N/A;    CYSTOSCOPY,WITH BOTULINUM TOXIN INJECTION N/A 12/13/2022    Procedure: CYSTOSCOPY,WITH BOTULINUM TOXIN INJECTION;  Surgeon: Shireen Mayo MD;  [facility];  Service: Urology;  Laterality: N/A;  300 U    CYSTOSCOPY,WITH BOTULINUM TOXIN INJECTION N/A 3/28/2023    Procedure: CYSTOSCOPY,WITH BOTULINUM TOXIN INJECTION;  Surgeon: Shrieen Mayo MD;  [address];  Service: Urology;  Laterality: N/A;  45 MIN.    300 UNITS    ESOPHAGOGASTRODUODENOSCOPY N/A 5/23/2018    Procedure: ESOPHAGOGASTRODUODENOSCOPY (EGD);  Surgeon: Prince Vance MD;  Location: [facility] (4TH FLR);  Service: Endoscopy;  Laterality: N/A;  r/s 'd per Dr. Vance due to family emergency- ER    ESOPHAGOGASTRODUODENOSCOPY N/A 6/23/2023    Procedure: EGD (ESOPHAGOGASTRODUODENOSCOPY);  Surgeon: Juaquin Barry MD;  Location: [facility] ENDO (2ND FLR);  Service: Endoscopy;  Laterality: N/A;  Refferal: PRINCE VANCE  tele [facility] 5/18/23  dx: history of a Nissen fundoplication  Additional Scheduling Information:  On home oxygen 2nd floor for airway protection also with a pain pump elevated BMI close to 40   Prep Gastroparesis   ins    HYSTERECTOMY  1975    endometriosis     INJECTION OF BOTULINUM TOXIN TYPE A  7/13/2021    Procedure: INJECTION, BOTULINUM TOXIN, 200units;  Surgeon: Shireen Mayo MD;  Location: [facility] OR [address] [place] [address];  Service: Urology;;    INJECTION OF BOTULINUM TOXIN TYPE A  11/16/2021    Procedure: INJECTION, BOTULINUM TOXIN, 200units;  Surgeon: Shireen Mayo MD;  Location: [facility] OR [address] [place] [address];  Service: Urology;;    INJECTION OF BOTULINUM TOXIN TYPE A  7/19/2022    Procedure: INJECTION, BOTULINUM TOXIN, 300 units ;  Surgeon: Shireen Mayo MD;  Location: [facility] OR [address] [place] [address];  Service: Urology;;    INJECTION OF BOTULINUM TOXIN TYPE A N/A 11/14/2023    Procedure: INJECTION, BOTULINUM TOXIN, TYPE A;  Surgeon: Shireen Mayo MD;  Location: [facility] OR [address] [place] [address];  Service: Urology;  Laterality: N/A;    INSERTION, SUPRAPUBIC CATHETER N/A 12/13/2022    Procedure: INSERTION, SUPRAPUBIC CATHETER;  Surgeon: Shireen Mayo MD;  Location: [facility] OR [address] [place] [address];  Service: Urology;  Laterality: N/A;  exchange    INSERTION, SUPRAPUBIC CATHETER N/A 11/14/2023    Procedure: INSERTION, SUPRAPUBIC CATHETER;  Surgeon: Shireen Mayo MD;  Location: [facility] OR [address] [place] [address];  Service: Urology;  Laterality: N/A;  EXCHANGE of s/p tube    pain pump placement      SQ Dilaudid Pump managed by Dr. Hillman, Pain Management    REMOVAL OF BONE SPUR OF FOOT Bilateral 9/16/2022    Procedure: EXCISION ARTHRITIC BONE, BILATERAL FOOT;  Surgeon: NICOLA Mcgrath;  Location: [facility] OR;  Service: Podiatry;  Laterality: Bilateral;    REPLACEMENT OF BACLOFEN PUMP N/A 8/2/2023    Procedure: REPLACEMENT, BACLOFEN PUMP;  Surgeon: Smitha Condon MD;  Location: [facility] OR [address] [place] [address];  Service: Neurosurgery;  Laterality: N/A;  Anes: Gen  Blood: Type & Screen  Pos: Left Lat  Intrathecal Pump  Bed: Reg Reversed  Rad: C-arm  Pump: 40 cc, [employer]    REPLACEMENT OF CATHETER N/A 10/31/2019    Procedure: REPLACEMENT, CATHETER-SUPRAPUBIC;  Surgeon: Shireen Mayo MD;  Location: [facility] OR [address] [place] [address];  Service: Urology;  Laterality:  N/A;    SPINAL CORD STIMULATOR REMOVAL      before [facility]    SPINE SURGERY  5-13-13    CERVICAL FUSION    TONSILLECTOMY         Review of patient's allergies indicates:   Allergen Reactions    (d)-limonene flavor      Other reaction(s): difficult intubation  Other reaction(s): Difficulty breathing    Benadryl [diphenhydramine hcl] Shortness Of Breath    Fentanyl Itching, Nausea And Vomiting and Swelling             Imitrex [sumatriptan succinate] Shortness Of Breath    Oxycodone-acetaminophen Shortness Of Breath    Sulfamethoxazole-trimethoprim Anaphylaxis    Topiramate Shortness Of Breath    Vancomycin Anaphylaxis     Rash    Butorphanol tartrate     Evening primrose (oenothera biennis)     Latex     Pregabalin Other (See Comments)     tremors    Propoxyphene n-acetaminophen      Other reaction(s): Difficulty breathing    Sumatriptan     White petrolatum-zinc     Zinc acetate     Zinc oxide-white petrolatum      Other reaction(s): Difficulty breathing    Latex, natural rubber Itching and Rash    Phenytoin Rash and Other (See Comments)     Trouble breathing       No current facility-administered medications on file prior to encounter.     Current Outpatient Medications on File Prior to Encounter   Medication Sig    amitriptyline (ELAVIL) 25 MG tablet Take 25 mg by mouth every evening.    butalbital-acetaminophen-caffeine -40 mg (FIORICET, ESGIC) -40 mg per tablet Take 1 tablet by mouth daily as needed.    darifenacin (ENABLEX) 7.5 MG Tb24 TAKE ONE TABLET BY MOUTH EVERY DAY (Patient taking differently: Take 7.5 mg by mouth once daily.)    DULoxetine (CYMBALTA) 60 MG capsule Take 1 capsule (60 mg total) by mouth 2 (two) times daily.    fludrocortisone (FLORINEF) 0.1 mg Tab Take a half-tablet (50 mcg) by mouth once daily    furosemide (LASIX) 40 MG tablet Take 2 tablets (80 mg total) by mouth 2 (two) times a day.    HYDROcodone-acetaminophen (NORCO)  mg per tablet Take 1 tablet by mouth every 6 (six)  hours as needed.    levothyroxine (SYNTHROID) 150 MCG tablet Take 1 tablet (150 mcg total) by mouth before breakfast.    LIDOcaine (LIDODERM) 5 % Place 1 patch onto the skin once daily. Remove & Discard patch within 12 hours or as directed by MD    pantoprazole (PROTONIX) 40 MG tablet Take 1 tablet (40 mg total) by mouth once daily.    triamcinolone acetonide 0.1% (KENALOG) 0.1 % cream Apply topically 2 (two) times daily.    aspirin (ECOTRIN) 81 MG EC tablet Take 1 tablet (81 mg total) by mouth once daily.    atorvastatin (LIPITOR) 80 MG tablet Take 1 tablet (80 mg total) by mouth once daily. (Patient not taking: Reported on 2/9/2024)    ciprofloxacin HCl (CIPRO) 500 MG tablet Take 1 tablet by mouth 2 (two) times daily.    cyanocobalamin, vitamin B-12, 5,000 mcg Subl Place 1 tablet under the tongue once daily.    diclofenac sodium (VOLTAREN ARTHRITIS PAIN) 1 % Gel Apply 4 g topically 4 (four) times daily. (Patient not taking: Reported on 2/9/2024)    docusate sodium (COLACE) 100 MG capsule Take 200 mg by mouth every evening.    fluticasone propionate (FLONASE) 50 mcg/actuation nasal spray 2 sprays (100 mcg total) by Each Nostril route once daily.    hydrocortisone (CORTEF) 10 MG Tab Take 1 tablet (10 mg total) by mouth every evening. (Patient not taking: Reported on 2/9/2024)    hydrOXYzine (ATARAX) 50 MG tablet Take 1 tablet (50 mg total) by mouth every 4 (four) hours as needed for Anxiety. (Patient not taking: Reported on 2/9/2024)    intrathecal pain pump compound Hydromorphone (7.5 mg/mL) infusion at 8.59 mg/day (0.3578 mg/hr) out of a total reservoir volume of 40 mL  Pump filled monthly    ketorolac (TORADOL) 10 mg tablet Take 10 mg by mouth daily as needed.    liothyronine (CYTOMEL) 5 MCG Tab Take 1 tablet (5 mcg total) by mouth once daily. (Patient not taking: Reported on 2/9/2024)    nitroGLYCERIN (NITROSTAT) 0.4 MG SL tablet Place 0.4 mg under the tongue every 5 (five) minutes as needed for Chest pain.     "nystatin (MYCOSTATIN) powder Apply topically 4 (four) times daily. (Patient not taking: Reported on 2/9/2024)    ondansetron (ZOFRAN-ODT) 4 MG TbDL Take 4 mg by mouth every 4 to 6 hours as needed.    potassium chloride (MICRO-K) 10 MEQ CpSR Take 2 capsules (20 mEq total) by mouth once daily.    promethazine (PHENERGAN) 25 MG tablet Take 25 mg by mouth every 6 (six) hours as needed for Nausea.    QUEtiapine (SEROQUEL) 100 MG Tab TAKE 2 TABLETS (200 MG) BY MOUTH NIGHTLY    senna (SENNA LAX) 8.6 mg tablet Take 2 tablets by mouth 2 (two) times daily.    tiotropium bromide (SPIRIVA RESPIMAT) 2.5 mcg/actuation inhaler Inhale 2 puffs into the lungs once daily. Controller (Patient not taking: Reported on 2/9/2024)    traMADoL (ULTRAM) 50 mg tablet Take 1 tablet (50 mg total) by mouth every 4 (four) hours as needed for Pain.    traZODone (DESYREL) 300 MG tablet Take 1 tablet (300 mg total) by mouth every evening.     Family History       Problem Relation (Age of Onset)    Cancer Mother (55), Father    Cirrhosis Paternal Aunt, Paternal Uncle    Colon cancer Maternal Uncle    Esophageal cancer Father    Heart disease Maternal Uncle    Liver disease Paternal Aunt, Paternal Uncle    No Known Problems Brother, Brother, Sister, Maternal Aunt, Maternal Grandfather, Paternal Grandmother, Paternal Grandfather    Parkinsonism Maternal Grandmother    Tremor Maternal Grandmother          Tobacco Use    Smoking status: Never    Smokeless tobacco: Never   Substance and Sexual Activity    Alcohol use: Never    Drug use: Yes     Types: Marijuana     Comment: "medical marijuana gummies"    Sexual activity: Not on file     Review of Systems   Constitutional:  Positive for activity change and fatigue. Negative for appetite change and chills.   HENT:  Negative for ear discharge, ear pain, postnasal drip and rhinorrhea.    Eyes:  Negative for pain and redness.   Respiratory:  Negative for cough and choking.    Cardiovascular:  Positive for leg " swelling. Negative for chest pain.   Gastrointestinal:  Negative for anal bleeding and blood in stool.   Endocrine: Negative for polydipsia and polyphagia.   Genitourinary:  Negative for decreased urine volume, dysuria, enuresis, flank pain, hematuria and urgency.   Musculoskeletal:  Positive for gait problem. Negative for back pain.   Skin:  Negative for pallor and rash.   Allergic/Immunologic: Negative for food allergies and immunocompromised state.   Neurological:  Positive for weakness. Negative for seizures and speech difficulty.   Hematological:  Negative for adenopathy. Does not bruise/bleed easily.   Psychiatric/Behavioral:  Negative for decreased concentration and dysphoric mood.      Objective:     Vital Signs (Most Recent):  Temp: 97.9 °F (36.6 °C) (02/09/24 2025)  Pulse: 71 (02/09/24 2025)  Resp: 16 (02/09/24 2025)  BP: (!) 111/56 (02/09/24 2025)  SpO2: 98 % (02/09/24 2025) Vital Signs (24h Range):  Temp:  [97.3 °F (36.3 °C)-98.1 °F (36.7 °C)] 97.9 °F (36.6 °C)  Pulse:  [66-71] 71  Resp:  [13-27] 16  SpO2:  [92 %-100 %] 98 %  BP: ()/(51-60) 111/56     Weight: 109.1 kg (240 lb 8.4 oz)  Body mass index is 37.67 kg/m².     Physical Exam  Vitals reviewed.   Constitutional:       General: She is not in acute distress.     Appearance: She is obese. She is ill-appearing. She is not toxic-appearing.   HENT:      Head: Normocephalic and atraumatic.      Right Ear: There is no impacted cerumen.      Left Ear: There is no impacted cerumen.      Nose: No congestion or rhinorrhea.      Mouth/Throat:      Pharynx: No oropharyngeal exudate or posterior oropharyngeal erythema.   Eyes:      General:         Right eye: No discharge.         Left eye: No discharge.   Cardiovascular:      Rate and Rhythm: Normal rate.      Heart sounds: No murmur heard.     No gallop.   Pulmonary:      Breath sounds: No wheezing or rales.   Abdominal:      General: There is no distension.      Tenderness: There is no abdominal  "tenderness.   Genitourinary:     Comments: Suprapubic tube in place  Musculoskeletal:         General: No swelling or deformity.      Cervical back: No rigidity.      Right lower leg: Edema present.      Left lower leg: Edema present.   Lymphadenopathy:      Cervical: No cervical adenopathy.   Skin:     Coloration: Skin is not jaundiced.      Findings: No bruising.   Neurological:      General: No focal deficit present.      Mental Status: She is alert.      Cranial Nerves: No cranial nerve deficit.      Motor: No weakness.   Psychiatric:         Mood and Affect: Mood normal.                Significant Labs: All pertinent labs within the past 24 hours have been reviewed.  Blood Culture: No results for input(s): "LABBLOO" in the last 48 hours.  BMP:   Recent Labs   Lab 02/09/24  1605   GLU 99      K 3.7      CO2 33*   BUN 7*   CREATININE 0.9   CALCIUM 8.6*     CBC:   Recent Labs   Lab 02/09/24  1605   WBC 5.25   HGB 11.4*   HCT 36.1*        CMP:   Recent Labs   Lab 02/09/24  1605      K 3.7      CO2 33*   GLU 99   BUN 7*   CREATININE 0.9   CALCIUM 8.6*   PROT 7.6   ALBUMIN 3.3*   BILITOT 0.4   ALKPHOS 160*   AST 16   ALT 10   ANIONGAP 8     Lipid Panel: No results for input(s): "CHOL", "HDL", "LDLCALC", "TRIG", "CHOLHDL" in the last 48 hours.  Troponin:   Recent Labs   Lab 02/09/24  1605   TROPONINI <0.006     TSH:   Recent Labs   Lab 01/05/24  1152   TSH 24.599*     Urine Culture: No results for input(s): "LABURIN" in the last 48 hours.    Significant Imaging: I have reviewed all pertinent imaging results/findings within the past 24 hours.  I have reviewed and interpreted all pertinent imaging results/findings within the past 24 hours.  Assessment/Plan:     * Frequent falls  Frequent falls over the past few weeks  Has been unable to take care of the patient    Plan:  PT/OT  Considering placement    Adrenal insufficiency  Continue with the patient's home regimen      Normocytic " anemia  Patient's anemia is currently controlled. Has not received any PRBCs to date. Etiology likely d/t Iron deficiency  Current CBC reviewed-   Lab Results   Component Value Date    HGB 11.4 (L) 02/09/2024    HCT 36.1 (L) 02/09/2024     Monitor serial CBC and transfuse if patient becomes hemodynamically unstable, symptomatic or H/H drops below 7/21.    UTI (urinary tract infection)  UA with WBCs, bacteria  Possible colonization due to suprapubic catheter    Plan:  Start ceftriaxone daily      Suprapubic catheter  Suprapubic catheter will need to be replaced shortly as per  discussion    Plan:  Consult Urology for possible bedside exchange      Lymphedema of both lower extremities  Significant lymphedema in bilateral extremities  Blistering noted on both    Plan:  Start IV Lasix 80 mg b.i.d..  Wound care      Hypothyroidism (acquired)  Unclear patient was taking both Synthroid and Cytomel    Plan:  C/w home regimin    Severe obesity (BMI 35.0-39.9) with comorbidity  Body mass index is 37.67 kg/m². Morbid obesity complicates all aspects of disease management from diagnostic modalities to treatment. Weight loss encouraged and health benefits explained to patient.         Narcotic dependency, continuous  Continue patient's home med regimen      Generalized anxiety disorder  Continue with patient's home medications        VTE Risk Mitigation (From admission, onward)           Ordered     heparin (porcine) injection 5,000 Units  Every 8 hours         02/09/24 1811     IP VTE HIGH RISK PATIENT  Once         02/09/24 1811     Place sequential compression device  Until discontinued         02/09/24 1811                       On 02/09/2024, patient should be placed in hospital observation services under my care.        VIRTUAL NURSE:  Called patient's  via telephone; verified spelling of patient's name and birthdate.  Introduced as VN for night shift that will be working with floor nurse and nursing  assistant.  Educated patient's Dangelo on VN's role in patient care and VIP model.  Plan of care reviewed with patient's .  Education per flowsheet.   Informed patient's  that staff will round on them every 2 hours but to use call light for any other needs they may have; informed of fall risk and fall precautions.  Patient's  verbalized understanding.  Call light within reach of patient.  Opportunity given for questions and questions answered.  Admission assessment questions answered.  Patient denies complaints or any needs at this time. Instructed to call for assistance.  Will cont to monitor and intervene as needed.     Labs, notes, orders, and careplan initiated.        02/09/24 2039   Admission   Initial VN Admission Questions Complete   Communication Issues? Patient Hearing   Shift   Pain Management Interventions relaxation techniques promoted;quiet environment facilitated   Virtual Nurse - Patient Verbalized Approval Of Camera Use;VN Rounding   Type of Frequent Check   Type Patient Rounds   Safety/Activity   Patient Rounds bed in low position;bed wheels locked;ID band on;call light in patient/parent reach;visualized patient   Safety Promotion/Fall Prevention Fall Risk reviewed with patient/family         Barber Allen MD  Department of Hospital Medicine  [place] [facility]

## 2024-02-10 NOTE — PLAN OF CARE
02/10/24 0936   Post-Acute Status   Post-Acute Authorization Other   Other Status Community Services  (Outpt Case Managment referral for assistance with placement from home.  referral sent to [facility] via [facility]. NP at home referral sent for urgent visit.)

## 2024-02-10 NOTE — PT/OT/SLP EVAL
Physical Therapy Evaluation    Patient Name:  Tasha Hawley   MRN:  [id number]    Recommendations:     Discharge Recommendations: Moderate Intensity Therapy   Discharge Equipment Recommendations: bath bench, wheelchair, other (see comments) (Pt reported tath she does not fit in her present WCand is in need of a new tub bench.)   Barriers to discharge: Inaccessible home and Decreased caregiver support    Assessment:     Tasha Hawley is a 68 y.o. female admitted with a medical diagnosis of Frequent falls.  She presents with the following impairments/functional limitations: weakness, impaired endurance, impaired self care skills, impaired functional mobility, gait instability, impaired balance, decreased coordination, decreased lower extremity function, decreased safety awareness, pain, decreased ROM, impaired coordination, impaired fine motor, impaired skin, edema, orthopedic precautions. Pt required max assist of two to transfer supine<>sit. She did not attempt to stand secondary to her report of fractures in B feet.    Rehab Prognosis: Fair; patient would benefit from acute skilled PT services to address these deficits and reach maximum level of function.    Recent Surgery: * No surgery found *      Plan:     During this hospitalization, patient to be seen 5 x/week to address the identified rehab impairments via gait training, therapeutic activities, therapeutic exercises, neuromuscular re-education and progress toward the following goals:    Plan of Care Expires:  03/10/24    Subjective     Chief Complaint: 10/10 pain in B LE's and back  Patient/Family Comments/goals: Pt reported that she could not stand secondary to fractures in B feet.  Pain/Comfort:  Pain Rating 1: 10/10  Location - Side 1: Bilateral  Location 1: back  Pain Addressed 1: Pre-medicate for activity  Pain Rating Post-Intervention 1: 10/10  Pain Rating 2: 10/10  Location - Side 2: Bilateral  Location 2: leg  Pain Addressed 2: Pre-medicate for  activity    Patients cultural, spiritual, Mandaen conflicts given the current situation: no    Living Environment:  Pt reported that franko lives in  single story home with her spouse with a ramp.  Prior to admission, patients level of function was in need of assistance.  She also reported that she fell 7 times at home yesterday.  Equipment used at home: hospital bed, oxygen, wheelchair, bath bench.  DME owned (not currently used):  Pt reported that she does not use her bath bench or wheelchair as she does not fit in them .  Upon discharge, patient will have assistance from her spouse.    Objective:     Communicated with Nurse prior to session.  Patient found HOB elevated with bed alarm, oxygen  upon PT entry to room.    General Precautions: Standard, fall  Orthopedic Precautions:N/A (awaiting LE weight bearing precautions)   Braces: N/A  Respiratory Status: Nasal cannula, flow 2 L/min    Exams:  Cognitive Exam:  Patient is oriented to Person, Place, Time, and Situation  RLE ROM: Pt with full knee extension and she was able to sit at bedside with hips and knees flexed  RLE Strength: 2/5  LLE ROM: Pt with full knee extnesion and she was able to sit at bedside with her hips and knees flexed  LLE Strength: 2/5    Functional Mobility:  Bed Mobility:     Supine to Sit: maximal assistance and of 2 persons  Sit to Supine: maximal assistance and of 2 persons      AM-PAC 6 CLICK MOBILITY  Total Score:9       Treatment & Education:  Role of Physical Therapy  Bed mobility  Sitting balance     Patient left HOB elevated with all lines intact, call button in reach, bed alarm on, and Nurse notified.    GOALS:   Multidisciplinary Problems       Physical Therapy Goals          Problem: Physical Therapy    Goal Priority Disciplines Outcome Goal Variances Interventions   Physical Therapy Goal     PT, PT/OT Ongoing, Progressing     Description: Goals to be met by: 3/10/2024    Patient will increase functional independence with  mobility by performin. Bed mobility with Mod independence  2. Supine<>sit with Mod assist.                            History:     Past Medical History:   Diagnosis Date    Anxiety     Arthritis     Bilateral lower extremity edema     severe chronic    Carotid artery occlusion     Cataract     CHF (congestive heart failure)     Coronary artery disease     subtotalled LAD with collateral    Depression     Fever blister     Hard of hearing     Hypokalemia 2023    Hyponatremia 2022    Hypothyroid     Iron deficiency anemia     Lumbar radiculopathy     with chronic pain    Ocular migraine     Other emphysema 2023    Renal disorder     Sleep apnea     cpap       Past Surgical History:   Procedure Laterality Date    ADENOIDECTOMY      BACK SURGERY      x 12    CARDIAC CATHETERIZATION  2016    subtotalled LAD with right to left collaterals    CATARACT EXTRACTION W/  INTRAOCULAR LENS IMPLANT Left     Dr Coleman     CYSTOSCOPIC LITHOLAPAXY N/A 2019    Procedure: CYSTOLITHOLAPAXY;  Surgeon: Shireen Mayo MD;  Location: [facility] OR [address] [place] [address];  Service: Urology;  Laterality: N/A;    CYSTOSCOPIC LITHOLAPAXY N/A 9/3/2019    Procedure: CYSTOLITHOLAPAXY;  Surgeon: Shireen Mayo MD;  Location: [facility] OR [address] [place] [address];  Service: Urology;  Laterality: N/A;    CYSTOSCOPY N/A 2021    Procedure: CYSTOSCOPY;  Surgeon: Shireen Mayo MD;  Location: [facility] OR [address] [place] [address];  Service: Urology;  Laterality: N/A;    CYSTOSCOPY  2021    Procedure: CYSTOSCOPY;  Surgeon: Shireen Mayo MD;  Location: [facility] OR [address] [place] [address];  Service: Urology;;    CYSTOSCOPY  2022    Procedure: CYSTOSCOPY;  Surgeon: Shireen Mayo MD;  Location: [facility] OR [address] [place] [address];  Service: Urology;;    CYSTOSCOPY WITH INJECTION OF PERIURETHRAL BULKING AGENT  2022    Procedure: CYSTOSCOPY, WITH PERIURETHRAL BULKING AGENT INJECTION-MACROPLASTIQUE;  Surgeon: Shireen Mayo MD;  Location: [facility] OR [address] [place] [address];  Service: Urology;;    CYSTOSCOPY WITH  INJECTION OF PERIURETHRAL BULKING AGENT N/A 3/28/2023    Procedure: CYSTOSCOPY, WITH PERIURETHRAL BULKING AGENT INJECTION;  Surgeon: Shireen Mayo MD;  [facility];  Service: Urology;  Laterality: N/A;  [place]    CYSTOSCOPY WITH INJECTION OF PERIURETHRAL BULKING AGENT N/A 11/14/2023    Procedure: CYSTOSCOPY, WITH PERIURETHRAL BULKING AGENT INJECTION;  Surgeon: Shireen Mayo MD;  [facility];  Service: Urology;  Laterality: N/A;    CYSTOSCOPY,WITH BOTULINUM TOXIN INJECTION N/A 12/13/2022    Procedure: CYSTOSCOPY,WITH BOTULINUM TOXIN INJECTION;  Surgeon: Shireen Mayo MD;  [facility];  Service: Urology;  Laterality: N/A;  300 U    CYSTOSCOPY,WITH BOTULINUM TOXIN INJECTION N/A 3/28/2023    Procedure: CYSTOSCOPY,WITH BOTULINUM TOXIN INJECTION;  Surgeon: Shireen Mayo MD;  [facility];  Service: Urology;  Laterality: N/A;  45 MIN.    300 UNITS    ESOPHAGOGASTRODUODENOSCOPY N/A 5/23/2018    Procedure: ESOPHAGOGASTRODUODENOSCOPY (EGD);  Surgeon: Prince Vance MD;  Location: [facility] (4TH FLR);  Service: Endoscopy;  Laterality: N/A;  r/s 'd per Dr. Vance due to family emergency- ER    ESOPHAGOGASTRODUODENOSCOPY N/A 6/23/2023    Procedure: EGD (ESOPHAGOGASTRODUODENOSCOPY);  Surgeon: Juaquin Barry MD;  Location: [facility] ENDO (2ND FLR);  Service: Endoscopy;  Laterality: N/A;  Refferal: PRINCE VANCE  Order  tele enc 5/18/23  dx: history of a Nissen fundoplication  Additional Scheduling Information:  On home oxygen 2nd floor for airway protection also with a pain pump elevated BMI close to 40   Prep Gastroparesis   ins    HYSTERECTOMY  1975    endometriosis    INJECTION OF BOTULINUM TOXIN TYPE A  7/13/2021    Procedure: INJECTION, BOTULINUM TOXIN, 200units;  Surgeon: Shireen Mayo MD;  [facility];  Service: Urology;;    INJECTION OF BOTULINUM TOXIN TYPE A  11/16/2021    Procedure: INJECTION, BOTULINUM TOXIN, 200units;   Surgeon: Shireen Mayo MD;  Location: [facility] OR [address] [place] [address];  Service: Urology;;    INJECTION OF BOTULINUM TOXIN TYPE A  7/19/2022    Procedure: INJECTION, BOTULINUM TOXIN, 300 units ;  Surgeon: Shireen Mayo MD;  Location: [facility] OR [address] [place] [address];  Service: Urology;;    INJECTION OF BOTULINUM TOXIN TYPE A N/A 11/14/2023    Procedure: INJECTION, BOTULINUM TOXIN, TYPE A;  Surgeon: Shireen Mayo MD;  Location: [facility] OR [address] [place] [address];  Service: Urology;  Laterality: N/A;    INSERTION, SUPRAPUBIC CATHETER N/A 12/13/2022    Procedure: INSERTION, SUPRAPUBIC CATHETER;  Surgeon: Shireen Mayo MD;  Location: [facility] OR [address] [place] [address];  Service: Urology;  Laterality: N/A;  exchange    INSERTION, SUPRAPUBIC CATHETER N/A 11/14/2023    Procedure: INSERTION, SUPRAPUBIC CATHETER;  Surgeon: Shireen Mayo MD;  Location: [facility] OR [address] [place] [address];  Service: Urology;  Laterality: N/A;  EXCHANGE of s/p tube    pain pump placement      SQ Dilaudid Pump managed by Dr. Hillman, Pain Management    REMOVAL OF BONE SPUR OF FOOT Bilateral 9/16/2022    Procedure: EXCISION ARTHRITIC BONE, BILATERAL FOOT;  Surgeon: Adam Mcguire [facility];  Location: [facility];  Service: Podiatry;  Laterality: Bilateral;    REPLACEMENT OF BACLOFEN PUMP N/A 8/2/2023    Procedure: REPLACEMENT, BACLOFEN PUMP;  Surgeon: Smitha Condon MD;  Location: [facility] OR [address] [place] [address];  Service: Neurosurgery;  Laterality: N/A;  Anes: Gen  Blood: Type & Screen  Pos: Left Lat  Intrathecal Pump  Bed: Reg Reversed  Rad: C-arm  Pump: 40 cc, [employer]    REPLACEMENT OF CATHETER N/A 10/31/2019    Procedure: REPLACEMENT, CATHETER-SUPRAPUBIC;  Surgeon: Shireen Mayo MD;  Location: [facility] OR [address] [place] [address];  Service: Urology;  Laterality: N/A;    SPINAL CORD STIMULATOR REMOVAL      before Larissa    SPINE SURGERY  5-13-13    CERVICAL FUSION    TONSILLECTOMY         Time Tracking:     PT Received On: 02/10/24  PT Start Time: 1145     PT Stop Time:  1213  PT Total Time (min):   23    Billable Minutes: Evaluation 10 and  Therapeutic Activity 13      02/10/2024
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
## 2024-02-10 NOTE — ASSESSMENT & PLAN NOTE
Frequent falls over the past few weeks  Has been unable to take care of the patient  Patient known to me from the past - very reluctant to placement & typically requests discharge home in the past    Plan:  PT/OT consult - prior h/o metatarsal fractures. bilateral foot xray ordered to determine WB status, consider ortho consult  Case management consult to assist with placement

## 2024-02-10 NOTE — PLAN OF CARE
Problem: Physical Therapy  Goal: Physical Therapy Goal  Description: Goals to be met by: 3/10/2024    Patient will increase functional independence with mobility by performin. Bed mobility with Mod independence  2. Supine<>sit with Mod assist.       Outcome: Ongoing, Progressing   Orders received and Physical Therapy evaluation completed.  Pt required Max assist of 2 to transfer Supine<>sit.  Weight bearing goals pending weight bearing status secondary to Pt reporting B foot fractures.  Recommending Moderate Intensity Therapy.  DME: WC and tub bench.

## 2024-02-10 NOTE — PROGRESS NOTES
VIRTUAL NURSE:  Called patient's  via telephone; verified spelling of patient's name and birthdate.  Introduced as VN for night shift that will be working with floor nurse and nursing assistant.  Educated patient's Dangelo on VN's role in patient care and VIP model.  Plan of care reviewed with patient's .  Education per flowsheet.   Informed patient's  that staff will round on them every 2 hours but to use call light for any other needs they may have; informed of fall risk and fall precautions.  Patient's  verbalized understanding.  Call light within reach of patient.  Opportunity given for questions and questions answered.  Admission assessment questions answered.  Patient denies complaints or any needs at this time. Instructed to call for assistance.  Will cont to monitor and intervene as needed.     Labs, notes, orders, and careplan initiated.        02/09/24 2039   Admission   Initial VN Admission Questions Complete   Communication Issues? Patient Hearing   Shift   Pain Management Interventions relaxation techniques promoted;quiet environment facilitated   Virtual Nurse - Patient Verbalized Approval Of Camera Use;VN Rounding   Type of Frequent Check   Type Patient Rounds   Safety/Activity   Patient Rounds bed in low position;bed wheels locked;ID band on;call light in patient/parent reach;visualized patient   Safety Promotion/Fall Prevention Fall Risk reviewed with patient/family

## 2024-02-10 NOTE — HPI
Tasha Hawley is a 68-year-old female with past medical history of chronic lymphedema, heart failure, depression who presents due to worsening lower extremity swelling, leaking from indwelling catheter, and repeated falls per .     is at bedside and provides much of the history.  He reports that over the past few weeks, his wife has been having multiple falls due to unsteady gait partly due to her worsening lower extremity edema.  He reports despite taking the oral Lasix, patient has lower extremity edema,which has worsened with blistering occurring.  Also reports some chest pain with shortness of breath.  He wishes for possible temporary placement due to difficulty taking care of her.    He also was concerned that she has not had replacement of her indwelling suprapubic catheter.  He reports consistent leaking and wishes to get that addressed.  Patient presents for further evaluation.    Triage vitals were significant for hypoxemia.  Review of systems significant for leg swelling.  Physical exam significant for lower extremity swelling and suprapubic catheter without significant erythema in surrounding skin.    CBC significant for normocytic anemia.  CMP significant for elevated ALP.  BNP and troponin negative.  UA with bacteria and WBCs.  Urine culture pending.    CT head without contrast is negative for any acute abnormality.    Chest x-ray concerning for hiatal hernia but no obvious cardiopulmonary infectious etiology noted.    Patient admitted for falls, UA, diuresis and lower extremity wound management.

## 2024-02-10 NOTE — PT/OT/SLP EVAL
Occupational Therapy   Evaluation    Name: Tasha Hawley  MRN: [id number]  Admitting Diagnosis: Frequent falls  Recent Surgery: * No surgery found *      Recommendations:     Discharge Recommendations: Moderate Intensity Therapy  Discharge Equipment Recommendations:  bath bench, wheelchair  Barriers to discharge:  Decreased caregiver support (due to level of assistance needed)    Assessment:     Tasha Hawley is a 68 y.o. female with a medical diagnosis of Frequent falls.  She presents with the following performance deficits affecting function: weakness, impaired endurance, impaired sensation, impaired self care skills, impaired functional mobility, gait instability, impaired balance, decreased coordination, impaired cognition, decreased lower extremity function, decreased safety awareness, decreased upper extremity function, pain, decreased ROM, impaired coordination, impaired skin, edema, impaired cardiopulmonary response to activity.  Pt was agreeable to and participated in OT/PT evaluation.  Pt lives with her  and reports that she has been having a lot of fall lately and broken toes on B feet.  Pt also states that her  has been helping her move with ADLs due to LE edema.  Pt completed evaluation and performed ADLS with set-up -total A and func mobility with min - max x2 A for bed mobility.  Standing/transfers not attempted due to pt report of fx toes in order to discuss WB limitations with MD. Goals established to assist pt with returning to PLOF regarding ADLs and func mobility.  Pt will benefit from skilled OT services in order to increase her level of safety and independence with ADLs and mobility.      Rehab Prognosis: Fair; patient would benefit from acute skilled OT services to address these deficits and reach maximum level of function.       Plan:     Patient to be seen 3 x/week to address the above listed problems via self-care/home management, therapeutic activities, therapeutic  "exercises  Plan of Care Expires: 03/11/24  Plan of Care Reviewed with: patient    Subjective     Chief Complaint: pain "everywhere"   Patient/Family Comments/goals: return home    Occupational Profile:  Living Environment: pt lives with  in [facility] with ramp access - has t/s combo for bathing  Previous level of function: pt reports decline - needs A with baths and LB dressing - frequent falls ("7 falls yesterday") and reports fractured toes on B feet  Roles and Routines: wife  Equipment Used at Home: oxygen, rollator, bedside commode (has w/c and TTB but in poor condition and needs replacement - W/C too narrow/small and TTB is unsteady)  Assistance upon Discharge: lives with , but reportedly having difficulty providing care for her    Pain/Comfort:  Pain Rating 1: 10/10  Location 1:  (everywhere)  Pain Rating Post-Intervention 1:  (improved but value not given)    Patients cultural, spiritual, Scientologist conflicts given the current situation: no    Objective:     Communicated with: nurse prior to session.  Patient found HOB elevated with meadows catheter, oxygen, bed alarm, telemetry upon OT entry to room.    General Precautions: Standard, fall  Orthopedic Precautions: N/A  Braces: N/A  Respiratory Status: Nasal cannula, flow 3 L/min    Occupational Performance:    Bed Mobility:    Patient completed Scooting/Bridging with minimum assistance and with side rail  Patient completed Supine to Sit with minimum assistance  Patient completed Sit to Supine with max x2 (1 person to assist with B LES and other to guide UB)    Functional Mobility/Transfers:  Not attempted - pt with increased pain in feet and stating toes in B feet fractured last month - OT/PT performed chart review with noted fx on R foot and recommendations for WB using surgical shoe only as pt will not follow NWB (therapists reached out for new ortho consult/xrays to verify WB status and fx due to numerous falls since December)      Activities of " Daily Living:  Grooming: set up sitting EOB  Upper Body Dressing: set up to vernell t-shirt  Lower Body Dressing: total assistance      Cognitive/Visual Perceptual:  Cognitive/Psychosocial Skills:     -       Oriented to: Person, Place, Time, and Situation   -       Follows Commands/attention:Follows one-step commands  -       Communication: clear/fluent  -       Memory: No Deficits noted  -       Safety awareness/insight to disability: impaired   -       Mood/Affect/Coping skills/emotional control: Appropriate to situation    Physical Exam:  Balance: -       sitting = good;  standing = NT  Postural examination/scapula alignment:    -       Rounded shoulders  -       Forward head  -       Posterior pelvic tilt  Skin integrity: red and blistering on B lower legs - seeping from wounds  Edema:  Pitting B lower legs - lymphodema  Sensation: -       Impaired  B lower legs  Upper Extremity Range of Motion:   Limited in B shoulders, but WFL for ADLS bilaterally  Upper Extremity Strength:  Limited in B shoulders, but WFL for ADLS bilaterally   Strength:  Good     AMPAC 6 Click ADL:  AMPAC Total Score: 15    Treatment & Education:  Pt completed ADLs and func mobility activities for tx session as noted above  Awaiting further testing to r/o further fractures in B feet and WB status (secure chat sent)  Pt educated on role of OT and POC      Patient left HOB elevated with all lines intact, call button in reach, and bed alarm on    GOALS:   Multidisciplinary Problems       Occupational Therapy Goals          Problem: Occupational Therapy    Goal Priority Disciplines Outcome Interventions   Occupational Therapy Goal     OT, PT/OT Ongoing, Progressing    Description: Goals to be met by: 03/11/24     Patient will increase functional independence with ADLs by performing:    UE Dressing with Modified [place].  Grooming while seated at sink with Modified [place].  Upper extremity exercise program 3x15 reps per handout, with  assistance as needed.                         History:     Past Medical History:   Diagnosis Date    Anxiety     Arthritis     Bilateral lower extremity edema     severe chronic    Carotid artery occlusion     Cataract     CHF (congestive heart failure)     Coronary artery disease     subtotalled LAD with collateral    Depression     Fever blister     Hard of hearing     Hypokalemia 01/09/2023    Hyponatremia 02/04/2022    Hypothyroid     Iron deficiency anemia     Lumbar radiculopathy     with chronic pain    Ocular migraine     Other emphysema 05/22/2023    Renal disorder     Sleep apnea     cpap         Past Surgical History:   Procedure Laterality Date    ADENOIDECTOMY      BACK SURGERY      x 12    CARDIAC CATHETERIZATION  2016    subtotalled LAD with right to left collaterals    CATARACT EXTRACTION W/  INTRAOCULAR LENS IMPLANT Left     Dr Coleman     CYSTOSCOPIC LITHOLAPAXY N/A 6/27/2019    Procedure: CYSTOLITHOLAPAXY;  Surgeon: Shireen Mayo MD;  Location: [facility] OR 2ND FLR;  Service: Urology;  Laterality: N/A;    CYSTOSCOPIC LITHOLAPAXY N/A 9/3/2019    Procedure: CYSTOLITHOLAPAXY;  Surgeon: Shireen Mayo MD;  Location: [facility] OR 2ND FLR;  Service: Urology;  Laterality: N/A;    CYSTOSCOPY N/A 7/13/2021    Procedure: CYSTOSCOPY;  Surgeon: Shireen Mayo MD;  [facility];  Service: Urology;  Laterality: N/A;    CYSTOSCOPY  11/16/2021    Procedure: CYSTOSCOPY;  Surgeon: Shireen Mayo MD;  [facility];  Service: Urology;;    CYSTOSCOPY  7/19/2022    Procedure: CYSTOSCOPY;  Surgeon: Shireen Mayo MD;  [facility];  Service: Urology;;    CYSTOSCOPY WITH INJECTION OF PERIURETHRAL BULKING AGENT  7/19/2022    Procedure: CYSTOSCOPY, WITH PERIURETHRAL BULKING AGENT INJECTION-MACROPLASTIQUE;  Surgeon: Shireen Mayo MD;  [facility];  Service: Urology;;    CYSTOSCOPY WITH INJECTION OF PERIURETHRAL BULKING AGENT N/A 3/28/2023    Procedure:  CYSTOSCOPY, WITH PERIURETHRAL BULKING AGENT INJECTION;  Surgeon: Shireen Mayo MD;  Location: [facility] OR 1ST FLR;  Service: Urology;  Laterality: N/A;  [place]    CYSTOSCOPY WITH INJECTION OF PERIURETHRAL BULKING AGENT N/A 11/14/2023    Procedure: CYSTOSCOPY, WITH PERIURETHRAL BULKING AGENT INJECTION;  Surgeon: Shireen Mayo MD;  Location: [facility] OR 1ST FLR;  Service: Urology;  Laterality: N/A;    CYSTOSCOPY,WITH BOTULINUM TOXIN INJECTION N/A 12/13/2022    Procedure: CYSTOSCOPY,WITH BOTULINUM TOXIN INJECTION;  Surgeon: Shireen Mayo MD;  Location: [facility] OR 1ST FLR;  Service: Urology;  Laterality: N/A;  300 U    CYSTOSCOPY,WITH BOTULINUM TOXIN INJECTION N/A 3/28/2023    Procedure: CYSTOSCOPY,WITH BOTULINUM TOXIN INJECTION;  Surgeon: Shireen Mayo MD;  Location: [facility] OR 1ST FLR;  Service: Urology;  Laterality: N/A;  45 MIN.    300 UNITS    ESOPHAGOGASTRODUODENOSCOPY N/A 5/23/2018    Procedure: ESOPHAGOGASTRODUODENOSCOPY (EGD);  Surgeon: Prince Vance MD;  Location: [facility] ENDO (4TH FLR);  Service: Endoscopy;  Laterality: N/A;  r/s 'd per Dr. Vance due to family emergency- ER    ESOPHAGOGASTRODUODENOSCOPY N/A 6/23/2023    Procedure: EGD (ESOPHAGOGASTRODUODENOSCOPY);  Surgeon: Juaquin Barry MD;  Location: [facility] ENDO (2ND FLR);  Service: Endoscopy;  Laterality: N/A;  Refferal: PRINCE VANCE  Order  tele enc 5/18/23  dx: history of a Nissen fundoplication  Additional Scheduling Information:  On home oxygen 2nd floor for airway protection also with a pain pump elevated BMI close to 40   Prep Gastroparesis   ins    HYSTERECTOMY  1975    endometriosis    INJECTION OF BOTULINUM TOXIN TYPE A  7/13/2021    Procedure: INJECTION, BOTULINUM TOXIN, 200units;  Surgeon: Shireen Mayo MD;  Location: [facility] OR 1ST FLR;  Service: Urology;;    INJECTION OF BOTULINUM TOXIN TYPE A  11/16/2021    Procedure: INJECTION, BOTULINUM TOXIN, 200units;  Surgeon: Shireen Mayo MD;  Location: [facility] OR [address] [place] [address];  Service:  Urology;;    INJECTION OF BOTULINUM TOXIN TYPE A  7/19/2022    Procedure: INJECTION, BOTULINUM TOXIN, 300 units ;  Surgeon: Shireen Mayo MD;  Location: [facility] OR [address] [place] [address];  Service: Urology;;    INJECTION OF BOTULINUM TOXIN TYPE A N/A 11/14/2023    Procedure: INJECTION, BOTULINUM TOXIN, TYPE A;  Surgeon: Shireen Mayo MD;  Location: [facility] OR [address] [place] [address];  Service: Urology;  Laterality: N/A;    INSERTION, SUPRAPUBIC CATHETER N/A 12/13/2022    Procedure: INSERTION, SUPRAPUBIC CATHETER;  Surgeon: Shireen Mayo MD;  [facility];  Service: Urology;  Laterality: N/A;  exchange    INSERTION, SUPRAPUBIC CATHETER N/A 11/14/2023    Procedure: INSERTION, SUPRAPUBIC CATHETER;  Surgeon: Shireen Mayo MD;  Location: [facility] OR [address] [place] [address];  Service: Urology;  Laterality: N/A;  EXCHANGE of s/p tube    pain pump placement      SQ Dilaudid Pump managed by Dr. Hillman, Pain Management    REMOVAL OF BONE SPUR OF FOOT Bilateral 9/16/2022    Procedure: EXCISION ARTHRITIC BONE, BILATERAL FOOT;  Surgeon: Adam Mcguire [facility];  Location: [facility] OR;  Service: Podiatry;  Laterality: Bilateral;    REPLACEMENT OF BACLOFEN PUMP N/A 8/2/2023    Procedure: REPLACEMENT, BACLOFEN PUMP;  Surgeon: Smitha Condon MD;  Location: [facility] OR [address] [place] [address];  Service: Neurosurgery;  Laterality: N/A;  Anes: Gen  Blood: Type & Screen  Pos: Left Lat  Intrathecal Pump  Bed: Reg Reversed  Rad: C-arm  Pump: 40 cc, [employer]    REPLACEMENT OF CATHETER N/A 10/31/2019    Procedure: REPLACEMENT, CATHETER-SUPRAPUBIC;  Surgeon: Shireen Mayo MD;  Location: [facility] OR [address] [place] [address];  Service: Urology;  Laterality: N/A;    SPINAL CORD STIMULATOR REMOVAL      before Larissa    SPINE SURGERY  5-13-13    CERVICAL FUSION    TONSILLECTOMY         Time Tracking:     OT Date of Treatment: 02/10/24  OT Start Time: 1145  OT Stop Time: 1212  OT Total Time (min): 27 min    Billable Minutes:Evaluation 10 - coeval  Self Care/Home Management 10    2/10/2024

## 2024-02-10 NOTE — ASSESSMENT & PLAN NOTE
UA with WBCs, bacteria  Possible colonization due to suprapubic catheter    Plan:  Start ceftriaxone daily

## 2024-02-10 NOTE — ASSESSMENT & PLAN NOTE
UA with pyuria  Possible colonization due to suprapubic catheter but pt reports weakness which she usually has with UTIs    Continue ceftriaxone pending culture  Urology consult for suprapubic catheter exchange. Urology not on call this weekend, will likely get catheter exchanged on Monday

## 2024-02-10 NOTE — ASSESSMENT & PLAN NOTE
Significant lymphedema in bilateral extremities  Blistering noted on both    Plan:  Start IV Lasix 80 mg b.i.d..  Wound care

## 2024-02-11 LAB
ANION GAP SERPL CALC-SCNC: 9 MMOL/L (ref 8–16)
BASOPHILS # BLD AUTO: 0.02 K/UL (ref 0–0.2)
BASOPHILS NFR BLD: 0.5 % (ref 0–1.9)
BUN SERPL-MCNC: 5 MG/DL (ref 8–23)
CALCIUM SERPL-MCNC: 8.5 MG/DL (ref 8.7–10.5)
CHLORIDE SERPL-SCNC: 101 MMOL/L (ref 95–110)
CO2 SERPL-SCNC: 31 MMOL/L (ref 23–29)
CREAT SERPL-MCNC: 0.8 MG/DL (ref 0.5–1.4)
DIFFERENTIAL METHOD BLD: ABNORMAL
EOSINOPHIL # BLD AUTO: 0.3 K/UL (ref 0–0.5)
EOSINOPHIL NFR BLD: 7.7 % (ref 0–8)
ERYTHROCYTE [DISTWIDTH] IN BLOOD BY AUTOMATED COUNT: 16.1 % (ref 11.5–14.5)
EST. GFR  (NO RACE VARIABLE): >60 ML/MIN/1.73 M^2
GLUCOSE SERPL-MCNC: 101 MG/DL (ref 70–110)
HCT VFR BLD AUTO: 34.8 % (ref 37–48.5)
HGB BLD-MCNC: 10.5 G/DL (ref 12–16)
IMM GRANULOCYTES # BLD AUTO: 0 K/UL (ref 0–0.04)
IMM GRANULOCYTES NFR BLD AUTO: 0 % (ref 0–0.5)
LYMPHOCYTES # BLD AUTO: 1.1 K/UL (ref 1–4.8)
LYMPHOCYTES NFR BLD: 25.7 % (ref 18–48)
MCH RBC QN AUTO: 27.1 PG (ref 27–31)
MCHC RBC AUTO-ENTMCNC: 30.2 G/DL (ref 32–36)
MCV RBC AUTO: 90 FL (ref 82–98)
MONOCYTES # BLD AUTO: 0.4 K/UL (ref 0.3–1)
MONOCYTES NFR BLD: 9.2 % (ref 4–15)
NEUTROPHILS # BLD AUTO: 2.4 K/UL (ref 1.8–7.7)
NEUTROPHILS NFR BLD: 56.9 % (ref 38–73)
NRBC BLD-RTO: 0 /100 WBC
PLATELET # BLD AUTO: 230 K/UL (ref 150–450)
PMV BLD AUTO: 9.4 FL (ref 9.2–12.9)
POTASSIUM SERPL-SCNC: 3.6 MMOL/L (ref 3.5–5.1)
RBC # BLD AUTO: 3.88 M/UL (ref 4–5.4)
SODIUM SERPL-SCNC: 141 MMOL/L (ref 136–145)
WBC # BLD AUTO: 4.13 K/UL (ref 3.9–12.7)

## 2024-02-11 PROCEDURE — 27000221 HC OXYGEN, UP TO 24 HOURS: Mod: HCNC

## 2024-02-11 PROCEDURE — 63600175 PHARM REV CODE 636 W HCPCS: Mod: HCNC | Performed by: STUDENT IN AN ORGANIZED HEALTH CARE EDUCATION/TRAINING PROGRAM

## 2024-02-11 PROCEDURE — 94640 AIRWAY INHALATION TREATMENT: CPT | Mod: HCNC

## 2024-02-11 PROCEDURE — 11000001 HC ACUTE MED/SURG PRIVATE ROOM: Mod: HCNC

## 2024-02-11 PROCEDURE — 85025 COMPLETE CBC W/AUTO DIFF WBC: CPT | Mod: HCNC | Performed by: STUDENT IN AN ORGANIZED HEALTH CARE EDUCATION/TRAINING PROGRAM

## 2024-02-11 PROCEDURE — 94761 N-INVAS EAR/PLS OXIMETRY MLT: CPT | Mod: HCNC

## 2024-02-11 PROCEDURE — 80048 BASIC METABOLIC PNL TOTAL CA: CPT | Mod: HCNC | Performed by: STUDENT IN AN ORGANIZED HEALTH CARE EDUCATION/TRAINING PROGRAM

## 2024-02-11 PROCEDURE — 99900035 HC TECH TIME PER 15 MIN (STAT): Mod: HCNC

## 2024-02-11 PROCEDURE — 25000003 PHARM REV CODE 250: Mod: HCNC | Performed by: STUDENT IN AN ORGANIZED HEALTH CARE EDUCATION/TRAINING PROGRAM

## 2024-02-11 PROCEDURE — 36415 COLL VENOUS BLD VENIPUNCTURE: CPT | Mod: HCNC | Performed by: STUDENT IN AN ORGANIZED HEALTH CARE EDUCATION/TRAINING PROGRAM

## 2024-02-11 RX ORDER — ENOXAPARIN SODIUM 100 MG/ML
40 INJECTION SUBCUTANEOUS EVERY 24 HOURS
Status: DISCONTINUED | OUTPATIENT
Start: 2024-02-11 | End: 2024-02-19 | Stop reason: HOSPADM

## 2024-02-11 RX ORDER — FERROUS SULFATE, DRIED 160(50) MG
2 TABLET, EXTENDED RELEASE ORAL 2 TIMES DAILY
Status: DISCONTINUED | OUTPATIENT
Start: 2024-02-11 | End: 2024-02-19 | Stop reason: HOSPADM

## 2024-02-11 RX ORDER — POTASSIUM CHLORIDE 20 MEQ/1
40 TABLET, EXTENDED RELEASE ORAL ONCE
Status: COMPLETED | OUTPATIENT
Start: 2024-02-11 | End: 2024-02-11

## 2024-02-11 RX ADMIN — OXYBUTYNIN CHLORIDE 5 MG: 5 TABLET, EXTENDED RELEASE ORAL at 08:02

## 2024-02-11 RX ADMIN — ENOXAPARIN SODIUM 40 MG: 40 INJECTION SUBCUTANEOUS at 05:02

## 2024-02-11 RX ADMIN — LEVOTHYROXINE SODIUM 150 MCG: 25 TABLET ORAL at 05:02

## 2024-02-11 RX ADMIN — Medication 2 TABLET: at 01:02

## 2024-02-11 RX ADMIN — DULOXETINE 60 MG: 30 CAPSULE, DELAYED RELEASE ORAL at 09:02

## 2024-02-11 RX ADMIN — FLUDROCORTISONE ACETATE 100 MCG: 0.1 TABLET ORAL at 08:02

## 2024-02-11 RX ADMIN — HYDROCORTISONE 10 MG: 5 TABLET ORAL at 09:02

## 2024-02-11 RX ADMIN — SENNOSIDES 2 TABLET: 8.6 TABLET, FILM COATED ORAL at 08:02

## 2024-02-11 RX ADMIN — CEFTRIAXONE 1 G: 1 INJECTION, POWDER, FOR SOLUTION INTRAMUSCULAR; INTRAVENOUS at 12:02

## 2024-02-11 RX ADMIN — ATORVASTATIN CALCIUM 80 MG: 40 TABLET, FILM COATED ORAL at 08:02

## 2024-02-11 RX ADMIN — POLYETHYLENE GLYCOL 3350 17 G: 17 POWDER, FOR SOLUTION ORAL at 08:02

## 2024-02-11 RX ADMIN — SENNOSIDES 2 TABLET: 8.6 TABLET, FILM COATED ORAL at 09:02

## 2024-02-11 RX ADMIN — POTASSIUM CHLORIDE 40 MEQ: 1500 TABLET, EXTENDED RELEASE ORAL at 08:02

## 2024-02-11 RX ADMIN — OXYCODONE HYDROCHLORIDE 5 MG: 5 TABLET ORAL at 05:02

## 2024-02-11 RX ADMIN — DULOXETINE 60 MG: 30 CAPSULE, DELAYED RELEASE ORAL at 08:02

## 2024-02-11 RX ADMIN — ACETAMINOPHEN 650 MG: 325 TABLET ORAL at 09:02

## 2024-02-11 RX ADMIN — TIOTROPIUM BROMIDE INHALATION SPRAY 2 PUFF: 3.12 SPRAY, METERED RESPIRATORY (INHALATION) at 08:02

## 2024-02-11 RX ADMIN — HEPARIN SODIUM 5000 UNITS: 5000 INJECTION INTRAVENOUS; SUBCUTANEOUS at 05:02

## 2024-02-11 RX ADMIN — FUROSEMIDE 20 MG: 10 INJECTION, SOLUTION INTRAMUSCULAR; INTRAVENOUS at 09:02

## 2024-02-11 RX ADMIN — FUROSEMIDE 20 MG: 10 INJECTION, SOLUTION INTRAMUSCULAR; INTRAVENOUS at 08:02

## 2024-02-11 RX ADMIN — ASPIRIN 81 MG: 81 TABLET, COATED ORAL at 08:02

## 2024-02-11 RX ADMIN — Medication 2 TABLET: at 09:02

## 2024-02-11 RX ADMIN — FLUTICASONE PROPIONATE 100 MCG: 50 SPRAY, METERED NASAL at 08:02

## 2024-02-11 NOTE — ASSESSMENT & PLAN NOTE
UA with pyuria  Possible colonization due to suprapubic catheter but pt reports weakness which she usually has with UTIs    Continue ceftriaxone. Urine cx growing gram negative chayo  Urology consult for suprapubic catheter exchange. Urology not on call this weekend, will likely get catheter exchanged on Monday

## 2024-02-11 NOTE — SUBJECTIVE & OBJECTIVE
Interval History: c/o leg pain and swelling. No new complains.     Review of Systems  Objective:     Vital Signs (Most Recent):  Temp: 97.5 °F (36.4 °C) (02/11/24 1534)  Pulse: 69 (02/11/24 1534)  Resp: 18 (02/11/24 1704)  BP: (!) 106/57 (02/11/24 1534)  SpO2: 95 % (02/11/24 1534) Vital Signs (24h Range):  Temp:  [96.7 °F (35.9 °C)-98.2 °F (36.8 °C)] 97.5 °F (36.4 °C)  Pulse:  [58-70] 69  Resp:  [12-20] 18  SpO2:  [93 %-99 %] 95 %  BP: ()/(47-59) 106/57     Weight: 110.1 kg (242 lb 11.6 oz)  Body mass index is 38.02 kg/m².    Intake/Output Summary (Last 24 hours) at 2/11/2024 1714  Last data filed at 2/11/2024 1200  Gross per 24 hour   Intake 578.97 ml   Output 2500 ml   Net -1921.03 ml           Physical Exam  Vitals and nursing note reviewed.   Constitutional:       General: She is not in acute distress.  Cardiovascular:      Rate and Rhythm: Normal rate and regular rhythm.   Pulmonary:      Effort: Pulmonary effort is normal. No respiratory distress.   Abdominal:      Palpations: Abdomen is soft.      Tenderness: There is no abdominal tenderness.   Genitourinary:     Comments: Suprapubic catheter in place  Musculoskeletal:      Right lower leg: Edema present.      Left lower leg: Edema present.   Skin:     General: Skin is warm and dry.      Comments: Erythematous b/l legs   Neurological:      General: No focal deficit present.      Mental Status: She is alert and oriented to person, place, and time.   Psychiatric:         Mood and Affect: Mood normal.             Significant Labs: All pertinent labs within the past 24 hours have been reviewed.    Significant Imaging: I have reviewed all pertinent imaging results/findings within the past 24 hours.

## 2024-02-11 NOTE — ASSESSMENT & PLAN NOTE
Patient's anemia is currently controlled. Has not received any PRBCs to date. Etiology likely d/t Iron deficiency  Current CBC reviewed-   Lab Results   Component Value Date    HGB 10.5 (L) 02/11/2024    HCT 34.8 (L) 02/11/2024     Monitor serial CBC and transfuse if patient becomes hemodynamically unstable, symptomatic or H/H drops below 7/21.

## 2024-02-11 NOTE — PROGRESS NOTES
[facility] Medicine  Progress Note    Patient Name: Tasha Hawley  MRN: [id number]  Patient Class: IP- Inpatient   Admission Date: 2/9/2024  Length of Stay: 1 days  Attending Physician: Bharti Sullivan MD  Primary Care Provider: Mesfin Hodges II, MD        Subjective:     Principal Problem:Frequent falls        HPI:  Tasha Hawley is a 68-year-old female with past medical history of chronic lymphedema, heart failure, depression who presents due to worsening lower extremity swelling, leaking from indwelling catheter, and repeated falls per .     is at bedside and provides much of the history.  He reports that over the past few weeks, his wife has been having multiple falls due to unsteady gait partly due to her worsening lower extremity edema.  He reports despite taking the oral Lasix, patient has lower extremity edema,which has worsened with blistering occurring.  Also reports some chest pain with shortness of breath.  He wishes for possible temporary placement due to difficulty taking care of her.    He also was concerned that she has not had replacement of her indwelling suprapubic catheter.  He reports consistent leaking and wishes to get that addressed.  Patient presents for further evaluation.    Triage vitals were significant for hypoxemia.  Review of systems significant for leg swelling.  Physical exam significant for lower extremity swelling and suprapubic catheter without significant erythema in surrounding skin.    CBC significant for normocytic anemia.  CMP significant for elevated ALP.  BNP and troponin negative.  UA with bacteria and WBCs.  Urine culture pending.    CT head without contrast is negative for any acute abnormality.    Chest x-ray concerning for hiatal hernia but no obvious cardiopulmonary infectious etiology noted.    Patient admitted for falls, UA, diuresis and lower extremity wound management.      Overview/Hospital Course:  No notes on  file    Interval History: c/o leg pain and swelling. No new complains.     Review of Systems  Objective:     Vital Signs (Most Recent):  Temp: 97.5 °F (36.4 °C) (02/11/24 1534)  Pulse: 69 (02/11/24 1534)  Resp: 18 (02/11/24 1704)  BP: (!) 106/57 (02/11/24 1534)  SpO2: 95 % (02/11/24 1534) Vital Signs (24h Range):  Temp:  [96.7 °F (35.9 °C)-98.2 °F (36.8 °C)] 97.5 °F (36.4 °C)  Pulse:  [58-70] 69  Resp:  [12-20] 18  SpO2:  [93 %-99 %] 95 %  BP: ()/(47-59) 106/57     Weight: 110.1 kg (242 lb 11.6 oz)  Body mass index is 38.02 kg/m².    Intake/Output Summary (Last 24 hours) at 2/11/2024 1714  Last data filed at 2/11/2024 1200  Gross per 24 hour   Intake 578.97 ml   Output 2500 ml   Net -1921.03 ml           Physical Exam  Vitals and nursing note reviewed.   Constitutional:       General: She is not in acute distress.  Cardiovascular:      Rate and Rhythm: Normal rate and regular rhythm.   Pulmonary:      Effort: Pulmonary effort is normal. No respiratory distress.   Abdominal:      Palpations: Abdomen is soft.      Tenderness: There is no abdominal tenderness.   Genitourinary:     Comments: Suprapubic catheter in place  Musculoskeletal:      Right lower leg: Edema present.      Left lower leg: Edema present.   Skin:     General: Skin is warm and dry.      Comments: Erythematous b/l legs   Neurological:      General: No focal deficit present.      Mental Status: She is alert and oriented to person, place, and time.   Psychiatric:         Mood and Affect: Mood normal.             Significant Labs: All pertinent labs within the past 24 hours have been reviewed.    Significant Imaging: I have reviewed all pertinent imaging results/findings within the past 24 hours.    Assessment/Plan:      * Frequent falls  Frequent falls over the past few weeks  Has been unable to take care of the patient  Patient known to me from the past - very reluctant to placement & typically requests discharge home in the past    Plan:  PT/OT  consult - prior h/o metatarsal fractures. bilateral foot xray ordered to determine WB status - ?new non displaced fracture, ortho consult  Case management consult to assist with placement    Chronic hypoxic respiratory failure  Patient with Hypercapnic and Hypoxic Respiratory failure which is Chronic.  she is on home oxygen at 2-3 LPM.     .       Adrenal insufficiency  Continue with the patient's home regimen      Normocytic anemia  Patient's anemia is currently controlled. Has not received any PRBCs to date. Etiology likely d/t Iron deficiency  Current CBC reviewed-   Lab Results   Component Value Date    HGB 10.5 (L) 02/11/2024    HCT 34.8 (L) 02/11/2024     Monitor serial CBC and transfuse if patient becomes hemodynamically unstable, symptomatic or H/H drops below 7/21.    UTI (urinary tract infection)  UA with pyuria  Possible colonization due to suprapubic catheter but pt reports weakness which she usually has with UTIs    Continue ceftriaxone. Urine cx growing gram negative chayo  Urology consult for suprapubic catheter exchange. Urology not on call this weekend, will likely get catheter exchanged on Monday       Suprapubic catheter  noted      Lymphedema of both lower extremities  Significant lymphedema in bilateral extremities  Blistering noted on both    IV diuresis (dose lowered due to hypotension)  Wound care      Hypothyroidism (acquired)  Unclear patient was taking both Synthroid and Cytomel  Continue home regimen    Severe obesity (BMI 35.0-39.9) with comorbidity  Body mass index is 37.67 kg/m². Morbid obesity complicates all aspects of disease management from diagnostic modalities to treatment. Weight loss encouraged and health benefits explained to patient.         Narcotic dependency, continuous  Pt has a SC dilaudid pump managed by pain clinic, Dr. Hillman    Pt requesting for IV dilaudid. Discussed with refrain from parenteral narcotics given the SC dilaudid pump.   Pt in agreement with low dose  PRN oxycodone, tylenol for breakthrough pain      Generalized anxiety disorder  Continue with patient's home medications        VTE Risk Mitigation (From admission, onward)           Ordered     enoxaparin injection 40 mg  Every 24 hours         02/11/24 0840     IP VTE HIGH RISK PATIENT  Once         02/09/24 1811     Place sequential compression device  Until discontinued         02/09/24 1811                    Discharge Planning   JACKIE:      Code Status: Full Code   Is the patient medically ready for discharge?:     Reason for patient still in hospital (select all that apply): Patient trending condition and Consult recommendations                     Bharti Sullivan MD  Department of Hospital Medicine   [facility] S[facility]

## 2024-02-11 NOTE — ASSESSMENT & PLAN NOTE
Frequent falls over the past few weeks  Has been unable to take care of the patient  Patient known to me from the past - very reluctant to placement & typically requests discharge home in the past    Plan:  PT/OT consult - prior h/o metatarsal fractures. bilateral foot xray ordered to determine WB status - ?new non displaced fracture, ortho consult  Case management consult to assist with placement

## 2024-02-12 LAB
ANION GAP SERPL CALC-SCNC: 10 MMOL/L (ref 8–16)
BACTERIA UR CULT: ABNORMAL
BUN SERPL-MCNC: 7 MG/DL (ref 8–23)
CALCIUM SERPL-MCNC: 9.4 MG/DL (ref 8.7–10.5)
CHLORIDE SERPL-SCNC: 100 MMOL/L (ref 95–110)
CO2 SERPL-SCNC: 30 MMOL/L (ref 23–29)
CREAT SERPL-MCNC: 0.8 MG/DL (ref 0.5–1.4)
EST. GFR  (NO RACE VARIABLE): >60 ML/MIN/1.73 M^2
GLUCOSE SERPL-MCNC: 87 MG/DL (ref 70–110)
MAGNESIUM SERPL-MCNC: 1.9 MG/DL (ref 1.6–2.6)
POTASSIUM SERPL-SCNC: 4.1 MMOL/L (ref 3.5–5.1)
SODIUM SERPL-SCNC: 140 MMOL/L (ref 136–145)
TB INDURATION 48 - 72 HR READ: 0 MM

## 2024-02-12 PROCEDURE — 97530 THERAPEUTIC ACTIVITIES: CPT | Mod: HCNC

## 2024-02-12 PROCEDURE — 80048 BASIC METABOLIC PNL TOTAL CA: CPT | Mod: HCNC | Performed by: STUDENT IN AN ORGANIZED HEALTH CARE EDUCATION/TRAINING PROGRAM

## 2024-02-12 PROCEDURE — 94761 N-INVAS EAR/PLS OXIMETRY MLT: CPT | Mod: HCNC

## 2024-02-12 PROCEDURE — 97110 THERAPEUTIC EXERCISES: CPT | Mod: HCNC

## 2024-02-12 PROCEDURE — 63600175 PHARM REV CODE 636 W HCPCS: Mod: HCNC | Performed by: STUDENT IN AN ORGANIZED HEALTH CARE EDUCATION/TRAINING PROGRAM

## 2024-02-12 PROCEDURE — 36415 COLL VENOUS BLD VENIPUNCTURE: CPT | Mod: HCNC | Performed by: STUDENT IN AN ORGANIZED HEALTH CARE EDUCATION/TRAINING PROGRAM

## 2024-02-12 PROCEDURE — 11000001 HC ACUTE MED/SURG PRIVATE ROOM: Mod: HCNC

## 2024-02-12 PROCEDURE — 27000221 HC OXYGEN, UP TO 24 HOURS: Mod: HCNC

## 2024-02-12 PROCEDURE — 99900035 HC TECH TIME PER 15 MIN (STAT): Mod: HCNC

## 2024-02-12 PROCEDURE — 99232 SBSQ HOSP IP/OBS MODERATE 35: CPT | Mod: HCNC,,, | Performed by: UROLOGY

## 2024-02-12 PROCEDURE — 25000003 PHARM REV CODE 250: Mod: HCNC | Performed by: STUDENT IN AN ORGANIZED HEALTH CARE EDUCATION/TRAINING PROGRAM

## 2024-02-12 PROCEDURE — 94640 AIRWAY INHALATION TREATMENT: CPT | Mod: HCNC

## 2024-02-12 PROCEDURE — 83735 ASSAY OF MAGNESIUM: CPT | Mod: HCNC | Performed by: STUDENT IN AN ORGANIZED HEALTH CARE EDUCATION/TRAINING PROGRAM

## 2024-02-12 PROCEDURE — 0T2BX0Z CHANGE DRAINAGE DEVICE IN BLADDER, EXTERNAL APPROACH: ICD-10-PCS | Performed by: UROLOGY

## 2024-02-12 RX ORDER — MUPIROCIN 20 MG/G
OINTMENT TOPICAL 2 TIMES DAILY
Status: COMPLETED | OUTPATIENT
Start: 2024-02-12 | End: 2024-02-16

## 2024-02-12 RX ADMIN — OXYCODONE HYDROCHLORIDE 5 MG: 5 TABLET ORAL at 09:02

## 2024-02-12 RX ADMIN — Medication 2 TABLET: at 09:02

## 2024-02-12 RX ADMIN — LEVOTHYROXINE SODIUM 150 MCG: 25 TABLET ORAL at 06:02

## 2024-02-12 RX ADMIN — SENNOSIDES 2 TABLET: 8.6 TABLET, FILM COATED ORAL at 10:02

## 2024-02-12 RX ADMIN — DULOXETINE 60 MG: 30 CAPSULE, DELAYED RELEASE ORAL at 09:02

## 2024-02-12 RX ADMIN — MUPIROCIN: 20 OINTMENT TOPICAL at 11:02

## 2024-02-12 RX ADMIN — ASPIRIN 81 MG: 81 TABLET, COATED ORAL at 09:02

## 2024-02-12 RX ADMIN — OXYCODONE HYDROCHLORIDE 5 MG: 5 TABLET ORAL at 02:02

## 2024-02-12 RX ADMIN — SENNOSIDES 2 TABLET: 8.6 TABLET, FILM COATED ORAL at 09:02

## 2024-02-12 RX ADMIN — TRAZODONE HYDROCHLORIDE 300 MG: 100 TABLET ORAL at 12:02

## 2024-02-12 RX ADMIN — ENOXAPARIN SODIUM 40 MG: 40 INJECTION SUBCUTANEOUS at 05:02

## 2024-02-12 RX ADMIN — ATORVASTATIN CALCIUM 80 MG: 40 TABLET, FILM COATED ORAL at 09:02

## 2024-02-12 RX ADMIN — TIOTROPIUM BROMIDE INHALATION SPRAY 2 PUFF: 3.12 SPRAY, METERED RESPIRATORY (INHALATION) at 08:02

## 2024-02-12 RX ADMIN — HYDROCORTISONE 10 MG: 5 TABLET ORAL at 10:02

## 2024-02-12 RX ADMIN — OXYCODONE HYDROCHLORIDE 5 MG: 5 TABLET ORAL at 05:02

## 2024-02-12 RX ADMIN — Medication 2 TABLET: at 10:02

## 2024-02-12 RX ADMIN — OXYBUTYNIN CHLORIDE 5 MG: 5 TABLET, EXTENDED RELEASE ORAL at 09:02

## 2024-02-12 RX ADMIN — DULOXETINE 60 MG: 30 CAPSULE, DELAYED RELEASE ORAL at 10:02

## 2024-02-12 RX ADMIN — FLUDROCORTISONE ACETATE 100 MCG: 0.1 TABLET ORAL at 09:02

## 2024-02-12 RX ADMIN — CEFTRIAXONE 1 G: 1 INJECTION, POWDER, FOR SOLUTION INTRAMUSCULAR; INTRAVENOUS at 10:02

## 2024-02-12 RX ADMIN — CEFTRIAXONE 1 G: 1 INJECTION, POWDER, FOR SOLUTION INTRAMUSCULAR; INTRAVENOUS at 12:02

## 2024-02-12 RX ADMIN — MUPIROCIN: 20 OINTMENT TOPICAL at 10:02

## 2024-02-12 RX ADMIN — FUROSEMIDE 20 MG: 10 INJECTION, SOLUTION INTRAMUSCULAR; INTRAVENOUS at 09:02

## 2024-02-12 RX ADMIN — FUROSEMIDE 20 MG: 10 INJECTION, SOLUTION INTRAMUSCULAR; INTRAVENOUS at 10:02

## 2024-02-12 RX ADMIN — TRAZODONE HYDROCHLORIDE 300 MG: 100 TABLET ORAL at 10:02

## 2024-02-12 NOTE — PLAN OF CARE
SW met with pt at bedside to discuss dc planning. Pt confirmed information on chart. Pt resides in home with her . Pt requires assistance with ADLs from her . Pt gets around in wheelchair. Pt has had recent falls. Pts  was on cell phone during assessment. Pt and pts  are agreeable to SNF placement upon dc. SW expressed that referrals had to be sent out 50 miles due to recent SNF denials. Pt has wheelchair, rw, rollator, concentrator, and portable O2 at home.     SW will continue to follow pt throughout her transitions of care and assist with any dc needs.     SW sent SNF referrals via [employer].     Future Appointments   Date Time Provider Department Center   3/13/2024  1:00 PM Cat Cortés MD [facility]        02/12/24 1050   Discharge Assessment   Assessment Type Discharge Planning Assessment   Confirmed/corrected address, phone number and insurance Yes   Confirmed Demographics Correct on Facesheet   Source of Information patient   Communicated JACKIE with patient/caregiver Yes   Reason For Admission Frequent falls (Chronic)   People in Home spouse   Do you expect to return to your current living situation? Yes   Do you have help at home or someone to help you manage your care at home? Yes   Who are your caregiver(s) and their phone number(s)? Lisa Thompson (Daughter) [phone]   Prior to hospitilization cognitive status: Alert/Oriented   Current cognitive status: Alert/Oriented   Home Layout Able to live on 1st floor   Equipment Currently Used at Home walker, rolling;rollator;wheelchair;oxygen   Do you currently have service(s) that help you manage your care at home? No   Do you take prescription medications? Yes   Do you have prescription coverage? Yes   Coverage [employer] MANAGED MEDICARE - [employer] MEDICARE [employer] - CAPITATED   Do you have any problems affording any of your prescribed medications? No   Is the patient taking medications as prescribed? yes   Who is  going to help you get home at discharge? Family   How do you get to doctors appointments? family or friend will provide   Are you on dialysis? No   Do you take coumadin? No   Discharge Plan A   (TBD)   DME Needed Upon Discharge  none   Discharge Plan discussed with: Patient   Transition of Care Barriers None   Alcohol Use   Q1: How often do you have a drink containing alcohol? Never   Q2: How many drinks containing alcohol do you have on a typical day when you are drinking? None   Q3: How often do you have six or more drinks on one occasion? Never   OTHER   Name(s) of People in Home

## 2024-02-12 NOTE — PT/OT/SLP PROGRESS
Physical Therapy Treatment    Patient Name:  Tasha Hawley   MRN:  [id number]    Recommendations:     Discharge Recommendations: Moderate Intensity Therapy  Discharge Equipment Recommendations: bath bench  Barriers to discharge:  Pt requires higher levels of assistance with mobility from premorbid levels. Decreased insight by pt of assistance levels req'd for safe mobility at this time.      Assessment:     Tasha Hawley is a 68 y.o. female admitted with a medical diagnosis of Frequent falls.  She presents with the following impairments/functional limitations: weakness, impaired endurance, impaired sensation, impaired self care skills, impaired functional mobility, gait instability, impaired balance, pain, impaired cardiopulmonary response to activity, decreased safety awareness, edema, decreased lower extremity function, impaired skin, decreased upper extremity function, decreased coordination, orthopedic precautions.  Pt participated actively throughout session. Cont toward achieving PT POC goals and progress with gait training as able with MD clearance for BLE WBAT in [place] shoes use. Rec cont acute therapy participation and upon discharge Moderate Intensity Therapy to increase pt's safety w mobility and decrease her fall risk and readmission likelihood.     Rehab Prognosis: Good; patient would benefit from acute skilled PT services to address these deficits and reach maximum level of function.    Recent Surgery: * No surgery found *      Plan:     During this hospitalization, patient to be seen 5 x/week to address the identified rehab impairments via gait training, therapeutic activities, therapeutic exercises, neuromuscular re-education and progress toward the following goals:    Plan of Care Expires:  03/10/24    Subjective     Chief Complaint: reports 9/10 pain in B feet and with lower legs during movement or touch  Patient/Family Comments/goals: PLOF -  pt reports she is worried about going to a  nursing home and does not want to get more therapy at [facility] but will go other places  Pain/Comfort:  Pain Rating 1: 9/10  Location - Side 1: Bilateral  Location - Orientation 1: generalized  Location 1: foot  Pain Addressed 1: Reposition, Distraction, Cessation of Activity, Nurse notified      Objective:     Communicated with nsg prior to session.  Patient found supine with telemetry, bed alarm, oxygen (catheter) upon PT entry to room.     General Precautions: Standard, fall  Orthopedic Precautions: LLE weight bearing as tolerated, RLE weight bearing as tolerated (BLE WBAT and B flat hard surface shoes)  Braces: N/A  Respiratory Status: Nasal cannula, flow 2 L/min     Functional Mobility:  Bed mobility sup scoot initially min assist but req'd max assist x 2 to complete  Txfs sup>sit with SBA w increased time allowed to allow for increased ind levels;   sit>sup with min/mod assist to lift RLE (2nd leg) onto bed after pt initiated txf CGA/min assist  No OOB stance activity at this time pending wt bearing status ---   (provided by ortho MD directly after session- communicated w MD and pt)      AM-PAC 6 CLICK MOBILITY  Turning over in bed (including adjusting bedclothes, sheets and blankets)?: 3  Sitting down on and standing up from a chair with arms (e.g., wheelchair, bedside commode, etc.): 1  Moving from lying on back to sitting on the side of the bed?: 3  Moving to and from a bed to a chair (including a wheelchair)?: 1  Need to walk in hospital room?: 1  Climbing 3-5 steps with a railing?: 1  Basic Mobility Total Score: 10       Treatment & Education:  Provided ed for safety w mobility, PT POC, and clarified pt's discussion with MD as she reports difficulty hearing and processing.  Explained that MD stated pt with OA with bone spurs in LLE and in RLE metatarsal fx in layman terms.  Pt perf BUE therex and BLE therex 3 x 12 reps with increased time and rests provided;  pt sit EOB dangle x 15 min along with posture  marky as pt leaning toward L side unweighting R hip which she reported as painful when discussing LE in general later in session; bed mob and txf training provided - MD clarified pt will be BLE WBAT and BLE DARCO shoes to provide hard flat surface for ambulation trials.    Patient left supine with all lines intact, call button in reach, bed alarm on, and nsg notified..    GOALS:   Multidisciplinary Problems       Physical Therapy Goals          Problem: Physical Therapy    Goal Priority Disciplines Outcome Goal Variances Interventions   Physical Therapy Goal     PT, PT/OT Ongoing, Progressing     Description: Goals to be met by: 3/10/2024    Patient will increase functional independence with mobility by performin. Bed mobility with Mod independence  2. Supine<>sit with Mod assist.                            Time Tracking:     PT Received On: 24  PT Start Time: 922     PT Stop Time: 959  PT Total Time (min): 37 min     Billable Minutes: Therapeutic Activity 23 and Therapeutic Exercise 14    Treatment Type: Treatment  PT/PTA: PT     Number of PTA visits since last PT visit: 0     2024

## 2024-02-12 NOTE — SUBJECTIVE & OBJECTIVE
Past Medical History:   Diagnosis Date    Anxiety     Arthritis     Bilateral lower extremity edema     severe chronic    Carotid artery occlusion     Cataract     CHF (congestive heart failure)     Coronary artery disease     subtotalled LAD with collateral    Depression     Fever blister     Hard of hearing     Hypokalemia 01/09/2023    Hyponatremia 02/04/2022    Hypothyroid     Iron deficiency anemia     Lumbar radiculopathy     with chronic pain    Ocular migraine     Other emphysema 05/22/2023    Renal disorder     Sleep apnea     cpap       Past Surgical History:   Procedure Laterality Date    ADENOIDECTOMY      BACK SURGERY      x 12    CARDIAC CATHETERIZATION  2016    subtotalled LAD with right to left collaterals    CATARACT EXTRACTION W/  INTRAOCULAR LENS IMPLANT Left     Dr Coleman     CYSTOSCOPIC LITHOLAPAXY N/A 6/27/2019    Procedure: CYSTOLITHOLAPAXY;  Surgeon: Shireen Mayo MD;  Location: [facility] OR 2ND FLR;  Service: Urology;  Laterality: N/A;    CYSTOSCOPIC LITHOLAPAXY N/A 9/3/2019    Procedure: CYSTOLITHOLAPAXY;  Surgeon: Shireen Mayo MD;  Location: [facility] OR 2ND FLR;  Service: Urology;  Laterality: N/A;    CYSTOSCOPY N/A 7/13/2021    Procedure: CYSTOSCOPY;  Surgeon: Shireen Mayo MD;  Location: [facility] OR 1ST FLR;  Service: Urology;  Laterality: N/A;    CYSTOSCOPY  11/16/2021    Procedure: CYSTOSCOPY;  Surgeon: Shireen Mayo MD;  Location: [facility] OR 1ST FLR;  Service: Urology;;    CYSTOSCOPY  7/19/2022    Procedure: CYSTOSCOPY;  Surgeon: Shireen Mayo MD;  Location: [facility] OR 1ST FLR;  Service: Urology;;    CYSTOSCOPY WITH INJECTION OF PERIURETHRAL BULKING AGENT  7/19/2022    Procedure: CYSTOSCOPY, WITH PERIURETHRAL BULKING AGENT INJECTION-MACROPLASTIQUE;  Surgeon: Shireen Mayo MD;  Location: [facility] OR 1ST FLR;  Service: Urology;;    CYSTOSCOPY WITH INJECTION OF PERIURETHRAL BULKING AGENT N/A 3/28/2023    Procedure: CYSTOSCOPY, WITH PERIURETHRAL BULKING AGENT INJECTION;  Surgeon:  Shireen Mayo MD;  [facility];  Service: Urology;  Laterality: N/A;  [place]    CYSTOSCOPY WITH INJECTION OF PERIURETHRAL BULKING AGENT N/A 11/14/2023    Procedure: CYSTOSCOPY, WITH PERIURETHRAL BULKING AGENT INJECTION;  Surgeon: Shireen Mayo MD;  [facility];  Service: Urology;  Laterality: N/A;    CYSTOSCOPY,WITH BOTULINUM TOXIN INJECTION N/A 12/13/2022    Procedure: CYSTOSCOPY,WITH BOTULINUM TOXIN INJECTION;  Surgeon: Shireen Mayo MD;  [facility];  Service: Urology;  Laterality: N/A;  300 U    CYSTOSCOPY,WITH BOTULINUM TOXIN INJECTION N/A 3/28/2023    Procedure: CYSTOSCOPY,WITH BOTULINUM TOXIN INJECTION;  Surgeon: Shireen Mayo MD;  [facility];  Service: Urology;  Laterality: N/A;  45 MIN.    300 UNITS    ESOPHAGOGASTRODUODENOSCOPY N/A 5/23/2018    Procedure: ESOPHAGOGASTRODUODENOSCOPY (EGD);  Surgeon: Prince Vance MD;  Location: [facility] (4TH FLR);  Service: Endoscopy;  Laterality: N/A;  r/s 'd per Dr. Vance due to family emergency- ER    ESOPHAGOGASTRODUODENOSCOPY N/A 6/23/2023    Procedure: EGD (ESOPHAGOGASTRODUODENOSCOPY);  Surgeon: Juaquin Barry MD;  Location: [facility] ENDO (2ND FLR);  Service: Endoscopy;  Laterality: N/A;  Refferal: PRINCE VANCE  Order  tele [facility] 5/18/23  dx: history of a Nissen fundoplication  Additional Scheduling Information:  On home oxygen 2nd floor for airway protection also with a pain pump elevated BMI close to 40   Prep Gastroparesis   ins    HYSTERECTOMY  1975    endometriosis    INJECTION OF BOTULINUM TOXIN TYPE A  7/13/2021    Procedure: INJECTION, BOTULINUM TOXIN, 200units;  Surgeon: Shireen Mayo MD;  [facility];  Service: Urology;;    INJECTION OF BOTULINUM TOXIN TYPE A  11/16/2021    Procedure: INJECTION, BOTULINUM TOXIN, 200units;  Surgeon: Shireen Mayo MD;  Location: [facility] OR [address] [place] [address];  Service: Urology;;    INJECTION OF BOTULINUM TOXIN TYPE A  7/19/2022     Procedure: INJECTION, BOTULINUM TOXIN, 300 units ;  Surgeon: Shireen Mayo MD;  [facility];  Service: Urology;;    INJECTION OF BOTULINUM TOXIN TYPE A N/A 11/14/2023    Procedure: INJECTION, BOTULINUM TOXIN, TYPE A;  Surgeon: Shireen Mayo MD;  [facility];  Service: Urology;  Laterality: N/A;    INSERTION, SUPRAPUBIC CATHETER N/A 12/13/2022    Procedure: INSERTION, SUPRAPUBIC CATHETER;  Surgeon: Shireen Mayo MD;  [facility];  Service: Urology;  Laterality: N/A;  exchange    INSERTION, SUPRAPUBIC CATHETER N/A 11/14/2023    Procedure: INSERTION, SUPRAPUBIC CATHETER;  Surgeon: Shireen Mayo MD;  [facility];  Service: Urology;  Laterality: N/A;  EXCHANGE of s/p tube    pain pump placement      SQ Dilaudid Pump managed by Dr. Hillman, Pain Management    REMOVAL OF BONE SPUR OF FOOT Bilateral 9/16/2022    Procedure: EXCISION ARTHRITIC BONE, BILATERAL FOOT;  Surgeon: NICOLA Mcgrath;  Location: [facility] OR;  Service: Podiatry;  Laterality: Bilateral;    REPLACEMENT OF BACLOFEN PUMP N/A 8/2/2023    Procedure: REPLACEMENT, BACLOFEN PUMP;  Surgeon: Smitha Condon MD;  [facility];  Service: Neurosurgery;  Laterality: N/A;  Anes: Gen  Blood: Type & Screen  Pos: Left Lat  Intrathecal Pump  Bed: Reg Reversed  Rad: C-arm  Pump: 40 cc, medtronics    REPLACEMENT OF CATHETER N/A 10/31/2019    Procedure: REPLACEMENT, CATHETER-SUPRAPUBIC;  Surgeon: Shireen Mayo MD;  [facility];  Service: Urology;  Laterality: N/A;    SPINAL CORD STIMULATOR REMOVAL      before [place]    SPINE SURGERY  5-13-13    CERVICAL FUSION    TONSILLECTOMY         Review of patient's allergies indicates:   Allergen Reactions    (d)-limonene flavor      Other reaction(s): difficult intubation  Other reaction(s): Difficulty breathing    Benadryl [diphenhydramine hcl] Shortness Of Breath    Fentanyl Itching, Nausea And Vomiting and Swelling      "        Imitrex [sumatriptan succinate] Shortness Of Breath    Oxycodone-acetaminophen Shortness Of Breath    Sulfamethoxazole-trimethoprim Anaphylaxis    Topiramate Shortness Of Breath    Vancomycin Anaphylaxis     Rash    Butorphanol tartrate     Evening primrose (oenothera biennis)     Latex     Pregabalin Other (See Comments)     tremors    Propoxyphene n-acetaminophen      Other reaction(s): Difficulty breathing    Sumatriptan     White petrolatum-zinc     Zinc acetate     Zinc oxide-white petrolatum      Other reaction(s): Difficulty breathing    Latex, natural rubber Itching and Rash    Phenytoin Rash and Other (See Comments)     Trouble breathing       Family History       Problem Relation (Age of Onset)    Cancer Mother (55), Father    Cirrhosis Paternal Aunt, Paternal Uncle    Colon cancer Maternal Uncle    Esophageal cancer Father    Heart disease Maternal Uncle    Liver disease Paternal Aunt, Paternal Uncle    No Known Problems Brother, Brother, Sister, Maternal Aunt, Maternal Grandfather, Paternal Grandmother, Paternal Grandfather    Parkinsonism Maternal Grandmother    Tremor Maternal Grandmother            Tobacco Use    Smoking status: Never    Smokeless tobacco: Never   Substance and Sexual Activity    Alcohol use: Never    Drug use: Yes     Types: Marijuana     Comment: "medical marijuana gummies"    Sexual activity: Not on file       Review of Systems   Constitutional:  Negative for chills, diaphoresis and fever.   Respiratory:  Negative for shortness of breath.    Cardiovascular:  Negative for chest pain.   Genitourinary:  Negative for decreased urine volume, flank pain, hematuria and pelvic pain.        Leakage from SPT insertion site.   Neurological:  Positive for weakness. Negative for dizziness.       Objective:     Temp:  [97.5 °F (36.4 °C)-98.1 °F (36.7 °C)] 97.7 °F (36.5 °C)  Pulse:  [60-79] 60  Resp:  [17-22] 18  SpO2:  [94 %-98 %] 98 %  BP: ()/(50-58) 108/57  Weight: 105.8 kg (233 " "lb 4 oz)  Body mass index is 36.53 kg/m².    Date 02/12/24 0700 - 02/13/24 0659   Shift 1515-0320 3953-3939 4603-0690 24 Hour Total   INTAKE   P.O. 240   240   Shift Total(mL/kg) 240(2.3)   240(2.3)   OUTPUT   Urine(mL/kg/hr) 700   700   Shift Total(mL/kg) 700(6.6)   700(6.6)   Weight (kg) 105.8 105.8 105.8 105.8          Drains       Drain  Duration                  Suprapubic Catheter 11/14/23 100% silicone 20 Fr. 90 days                     Physical Exam  Constitutional:       Appearance: Normal appearance.   Pulmonary:      Effort: Pulmonary effort is normal.   Abdominal:      General: Abdomen is flat.      Palpations: Abdomen is soft.   Genitourinary:     General: Normal vulva.      Rectum: Normal.      Comments: Suprapubic area positive for erythema between abdominal folds and by SPT insertion site  Musculoskeletal:         General: Normal range of motion.      Cervical back: Normal range of motion.   Skin:     Comments: Bilateral lower extremities swollen, redness.   Neurological:      Mental Status: She is alert and oriented to person, place, and time.      Motor: Weakness present.   Psychiatric:         Mood and Affect: Mood normal.         Behavior: Behavior normal.          Significant Labs:    BMP:  Recent Labs   Lab 02/10/24  0501 02/11/24  0620 02/12/24  0454    141 140   K 3.9 3.6 4.1    101 100   CO2 30* 31* 30*   BUN 6* 5* 7*   CREATININE 0.8 0.8 0.8   CALCIUM 8.4* 8.5* 9.4       CBC:  Recent Labs   Lab 02/09/24  1605 02/10/24  0501 02/11/24  0620   WBC 5.25 4.49 4.13   HGB 11.4* 10.5* 10.5*   HCT 36.1* 34.5* 34.8*    227 230       Blood Culture: No results for input(s): "LABBLOO" in the last 168 hours.  CMP:   Recent Labs   Lab 02/10/24  0501 02/11/24  0620 02/12/24  0454    101 87    141 140   K 3.9 3.6 4.1    101 100   CO2 30* 31* 30*   BUN 6* 5* 7*   CREATININE 0.8 0.8 0.8   CALCIUM 8.4* 8.5* 9.4   MG  --   --  1.9     Urine Culture:   Recent Labs   Lab " 02/09/24  1708   LABURIN ESCHERICHIA COLI  >100,000 cfu/ml  *       Significant Imaging:  All pertinent imaging results/findings from the past 24 hours have been reviewed.

## 2024-02-12 NOTE — CONSULTS
[facility] Consult Note     Chief Complaint:  Left 5th metatarsal fracture     HPI:  68-year-old female with significant past medical history of chronic lymphedema, heart failure, and depression presented to the emergency department on 02/09/2024 for worsening lower extremity swelling, leaking from indwelling catheter, and repeated falls per .  Orthopedics was consulted after imaging was taking which demonstrated a 5th metatarsal fracture, unclear of chronicity.     PMH:    Past Medical History:   Diagnosis Date    Anxiety     Arthritis     Bilateral lower extremity edema     severe chronic    Carotid artery occlusion     Cataract     CHF (congestive heart failure)     Coronary artery disease     subtotalled LAD with collateral    Depression     Fever blister     Hard of hearing     Hypokalemia 01/09/2023    Hyponatremia 02/04/2022    Hypothyroid     Iron deficiency anemia     Lumbar radiculopathy     with chronic pain    Ocular migraine     Other emphysema 05/22/2023    Renal disorder     Sleep apnea     cpap     PSH:    Past Surgical History:   Procedure Laterality Date    ADENOIDECTOMY      BACK SURGERY      x 12    CARDIAC CATHETERIZATION  2016    subtotalled LAD with right to left collaterals    CATARACT EXTRACTION W/  INTRAOCULAR LENS IMPLANT Left     Dr Coleman     CYSTOSCOPIC LITHOLAPAXY N/A 6/27/2019    Procedure: CYSTOLITHOLAPAXY;  Surgeon: Shireen Mayo MD;  Location: [facility] OR 2ND FLR;  Service: Urology;  Laterality: N/A;    CYSTOSCOPIC LITHOLAPAXY N/A 9/3/2019    Procedure: CYSTOLITHOLAPAXY;  Surgeon: Shireen Mayo MD;  Location: [facility] OR 2ND FLR;  Service: Urology;  Laterality: N/A;    CYSTOSCOPY N/A 7/13/2021    Procedure: CYSTOSCOPY;  Surgeon: Shireen Mayo MD;  Location: [facility] OR 1ST FLR;  Service: Urology;  Laterality: N/A;    CYSTOSCOPY  11/16/2021    Procedure: CYSTOSCOPY;  Surgeon: Shireen Mayo MD;  Location: [facility] OR 1ST FLR;  Service: Urology;;    CYSTOSCOPY  7/19/2022     Procedure: CYSTOSCOPY;  Surgeon: Shireen Mayo MD;  [facility];  Service: Urology;;    CYSTOSCOPY WITH INJECTION OF PERIURETHRAL BULKING AGENT  7/19/2022    Procedure: CYSTOSCOPY, WITH PERIURETHRAL BULKING AGENT INJECTION-MACROPLASTIQUE;  Surgeon: Shireen Mayo MD;  [facility];  Service: Urology;;    CYSTOSCOPY WITH INJECTION OF PERIURETHRAL BULKING AGENT N/A 3/28/2023    Procedure: CYSTOSCOPY, WITH PERIURETHRAL BULKING AGENT INJECTION;  Surgeon: Shireen Mayo MD;  [facility];  Service: Urology;  Laterality: N/A;  [place]    CYSTOSCOPY WITH INJECTION OF PERIURETHRAL BULKING AGENT N/A 11/14/2023    Procedure: CYSTOSCOPY, WITH PERIURETHRAL BULKING AGENT INJECTION;  Surgeon: Shireen Mayo MD;  [facility];  Service: Urology;  Laterality: N/A;    CYSTOSCOPY,WITH BOTULINUM TOXIN INJECTION N/A 12/13/2022    Procedure: CYSTOSCOPY,WITH BOTULINUM TOXIN INJECTION;  Surgeon: Shireen Mayo MD;  [facility];  Service: Urology;  Laterality: N/A;  300 U    CYSTOSCOPY,WITH BOTULINUM TOXIN INJECTION N/A 3/28/2023    Procedure: CYSTOSCOPY,WITH BOTULINUM TOXIN INJECTION;  Surgeon: Shireen Mayo MD;  [facility];  Service: Urology;  Laterality: N/A;  45 MIN.    300 UNITS    ESOPHAGOGASTRODUODENOSCOPY N/A 5/23/2018    Procedure: ESOPHAGOGASTRODUODENOSCOPY (EGD);  Surgeon: Prince Vance MD;  Location: [facility] ENDO (4TH FLR);  Service: Endoscopy;  Laterality: N/A;  r/s 'd per Dr. Vance due to family emergency- ER    ESOPHAGOGASTRODUODENOSCOPY N/A 6/23/2023    Procedure: EGD (ESOPHAGOGASTRODUODENOSCOPY);  Surgeon: Juaquin Barry MD;  Location: [facility] ENDO (2ND FLR);  Service: Endoscopy;  Laterality: N/A;  Refferal: PRINCE VANCE  tele enc 5/18/23  dx: history of a Nissen fundoplication  Additional Scheduling Information:  On home oxygen 2nd floor for airway protection also with a pain pump elevated BMI close to  40   Prep Gastroparesis   ins    HYSTERECTOMY  1975    endometriosis    INJECTION OF BOTULINUM TOXIN TYPE A  7/13/2021    Procedure: INJECTION, BOTULINUM TOXIN, 200units;  Surgeon: Shireen Mayo MD;  Location: [facility] OR [address] [place] [address];  Service: Urology;;    INJECTION OF BOTULINUM TOXIN TYPE A  11/16/2021    Procedure: INJECTION, BOTULINUM TOXIN, 200units;  Surgeon: Shireen Mayo MD;  Location: [facility] OR [address] [place] [address];  Service: Urology;;    INJECTION OF BOTULINUM TOXIN TYPE A  7/19/2022    Procedure: INJECTION, BOTULINUM TOXIN, 300 units ;  Surgeon: Shireen Mayo MD;  Location: [facility] OR [address] [place] [address];  Service: Urology;;    INJECTION OF BOTULINUM TOXIN TYPE A N/A 11/14/2023    Procedure: INJECTION, BOTULINUM TOXIN, TYPE A;  Surgeon: Shireen Mayo MD;  Location: [facility] OR [address] [place] [address];  Service: Urology;  Laterality: N/A;    INSERTION, SUPRAPUBIC CATHETER N/A 12/13/2022    Procedure: INSERTION, SUPRAPUBIC CATHETER;  Surgeon: Shireen Mayo MD;  Location: [facility] OR [address] [place] [address];  Service: Urology;  Laterality: N/A;  exchange    INSERTION, SUPRAPUBIC CATHETER N/A 11/14/2023    Procedure: INSERTION, SUPRAPUBIC CATHETER;  Surgeon: Shireen Mayo MD;  Location: [facility] OR [address] [place] [address];  Service: Urology;  Laterality: N/A;  EXCHANGE of s/p tube    pain pump placement      SQ Dilaudid Pump managed by Dr. Hillman, Pain Management    REMOVAL OF BONE SPUR OF FOOT Bilateral 9/16/2022    Procedure: EXCISION ARTHRITIC BONE, BILATERAL FOOT;  Surgeon: Adam Mcguire DPM;  Location: [facility] OR;  Service: Podiatry;  Laterality: Bilateral;    REPLACEMENT OF BACLOFEN PUMP N/A 8/2/2023    Procedure: REPLACEMENT, BACLOFEN PUMP;  Surgeon: Smitha Condon MD;  Location: [facility] OR [address] [place] [address];  Service: Neurosurgery;  Laterality: N/A;  Anes: Gen  Blood: Type & Screen  Pos: Left Lat  Intrathecal Pump  Bed: Reg Reversed  Rad: C-arm  Pump: 40 cc, Neosenstronics    REPLACEMENT OF CATHETER N/A 10/31/2019    Procedure: REPLACEMENT, CATHETER-SUPRAPUBIC;  Surgeon: Shireen PICKETT  MD Maria Esther;  Location: [facility] OR [address] [place] [address];  Service: Urology;  Laterality: N/A;    SPINAL CORD STIMULATOR REMOVAL      before [place]    SPINE SURGERY  5-13-13    CERVICAL FUSION    TONSILLECTOMY       FH:  Noncontributory  SH: SOC@    Meds:    No current facility-administered medications on file prior to encounter.     Current Outpatient Medications on File Prior to Encounter   Medication Sig Dispense Refill    amitriptyline (ELAVIL) 25 MG tablet Take 25 mg by mouth every evening.      butalbital-acetaminophen-caffeine -40 mg (FIORICET, ESGIC) -40 mg per tablet Take 1 tablet by mouth daily as needed.      darifenacin (ENABLEX) 7.5 MG Tb24 TAKE ONE TABLET BY MOUTH EVERY DAY (Patient taking differently: Take 7.5 mg by mouth once daily.) 30 tablet 11    DULoxetine (CYMBALTA) 60 MG capsule Take 1 capsule (60 mg total) by mouth 2 (two) times daily. 60 capsule 11    fludrocortisone (FLORINEF) 0.1 mg Tab Take a half-tablet (50 mcg) by mouth once daily 15 tablet 5    furosemide (LASIX) 40 MG tablet Take 2 tablets (80 mg total) by mouth 2 (two) times a day. 360 tablet 3    HYDROcodone-acetaminophen (NORCO)  mg per tablet Take 1 tablet by mouth every 6 (six) hours as needed. 30 tablet 0    levothyroxine (SYNTHROID) 150 MCG tablet Take 1 tablet (150 mcg total) by mouth before breakfast. 90 tablet 3    LIDOcaine (LIDODERM) 5 % Place 1 patch onto the skin once daily. Remove & Discard patch within 12 hours or as directed by MD  0    pantoprazole (PROTONIX) 40 MG tablet Take 1 tablet (40 mg total) by mouth once daily. 90 tablet 3    triamcinolone acetonide 0.1% (KENALOG) 0.1 % cream Apply topically 2 (two) times daily. 80 g 11    aspirin (ECOTRIN) 81 MG EC tablet Take 1 tablet (81 mg total) by mouth once daily. 90 tablet 3    atorvastatin (LIPITOR) 80 MG tablet Take 1 tablet (80 mg total) by mouth once daily. (Patient not taking: Reported on 2/9/2024) 90 tablet 3    ciprofloxacin HCl (CIPRO) 500 MG tablet Take 1  tablet by mouth 2 (two) times daily.      cyanocobalamin, vitamin B-12, 5,000 mcg Subl Place 1 tablet under the tongue once daily.      diclofenac sodium (VOLTAREN ARTHRITIS PAIN) 1 % Gel Apply 4 g topically 4 (four) times daily. (Patient not taking: Reported on 2/9/2024) 150 g 5    docusate sodium (COLACE) 100 MG capsule Take 200 mg by mouth every evening.      fluticasone propionate (FLONASE) 50 mcg/actuation nasal spray 2 sprays (100 mcg total) by Each Nostril route once daily. 16 g 0    hydrocortisone (CORTEF) 10 MG Tab Take 1 tablet (10 mg total) by mouth every evening. (Patient not taking: Reported on 2/9/2024) 30 tablet 5    hydrOXYzine (ATARAX) 50 MG tablet Take 1 tablet (50 mg total) by mouth every 4 (four) hours as needed for Anxiety. (Patient not taking: Reported on 2/9/2024) 30 tablet 0    intrathecal pain pump compound Hydromorphone (7.5 mg/mL) infusion at 8.59 mg/day (0.3578 mg/hr) out of a total reservoir volume of 40 mL  Pump filled monthly      ketorolac (TORADOL) 10 mg tablet Take 10 mg by mouth daily as needed.      liothyronine (CYTOMEL) 5 MCG Tab Take 1 tablet (5 mcg total) by mouth once daily. (Patient not taking: Reported on 2/9/2024) 30 tablet 11    nitroGLYCERIN (NITROSTAT) 0.4 MG SL tablet Place 0.4 mg under the tongue every 5 (five) minutes as needed for Chest pain.      nystatin (MYCOSTATIN) powder Apply topically 4 (four) times daily. (Patient not taking: Reported on 2/9/2024) 60 g 0    ondansetron (ZOFRAN-ODT) 4 MG TbDL Take 4 mg by mouth every 4 to 6 hours as needed.      potassium chloride (MICRO-K) 10 MEQ CpSR Take 2 capsules (20 mEq total) by mouth once daily. 60 capsule 11    promethazine (PHENERGAN) 25 MG tablet Take 25 mg by mouth every 6 (six) hours as needed for Nausea.      QUEtiapine (SEROQUEL) 100 MG Tab TAKE 2 TABLETS (200 MG) BY MOUTH NIGHTLY 180 tablet 3    senna (SENNA LAX) 8.6 mg tablet Take 2 tablets by mouth 2 (two) times daily.      tiotropium bromide (SPIRIVA  "RESPIMAT) 2.5 mcg/actuation inhaler Inhale 2 puffs into the lungs once daily. Controller (Patient not taking: Reported on 2/9/2024) 4 g 1    traMADoL (ULTRAM) 50 mg tablet Take 1 tablet (50 mg total) by mouth every 4 (four) hours as needed for Pain. 30 tablet 0    traZODone (DESYREL) 300 MG tablet Take 1 tablet (300 mg total) by mouth every evening. 90 tablet 3       Allergies:    Review of patient's allergies indicates:   Allergen Reactions    (d)-limonene flavor      Other reaction(s): difficult intubation  Other reaction(s): Difficulty breathing    Benadryl [diphenhydramine hcl] Shortness Of Breath    Fentanyl Itching, Nausea And Vomiting and Swelling             Imitrex [sumatriptan succinate] Shortness Of Breath    Oxycodone-acetaminophen Shortness Of Breath    Sulfamethoxazole-trimethoprim Anaphylaxis    Topiramate Shortness Of Breath    Vancomycin Anaphylaxis     Rash    Butorphanol tartrate     Evening primrose (oenothera biennis)     Latex     Pregabalin Other (See Comments)     tremors    Propoxyphene n-acetaminophen      Other reaction(s): Difficulty breathing    Sumatriptan     White petrolatum-zinc     Zinc acetate     Zinc oxide-white petrolatum      Other reaction(s): Difficulty breathing    Latex, natural rubber Itching and Rash    Phenytoin Rash and Other (See Comments)     Trouble breathing        ROS:  otherwise negative except indicated in HPI      Exam:  Vitals:  BP (!) 112/55 (BP Location: Right arm, Patient Position: Lying)   Pulse 70   Temp 97.7 °F (36.5 °C) (Oral)   Resp 17   Ht 5' 7" (1.702 m)   Wt 105.8 kg (233 lb 4 oz)   LMP  (LMP Unknown)   SpO2 97%   BMI 36.53 kg/m²   Gen:  Awake and alert, NAD  Resp: No increased WOB  Cards: RRR by PP  Abd:  Non-distended, benign    LLE:  Grossly swollen throughout the foot and lower leg  TTP over the 5th metatarsal and some to the midfoot   No pain with logroll.  No pain about the knee  Range of motion full without pain about the hip and " "knee  TA/gastroc/EHL/FHL intact with 5/5 strength  SILT grossly  2+ DP pulse     Labs:  Recent Results (from the past 24 hour(s))   Basic Metabolic Panel (BMP)    Collection Time: 02/12/24  4:54 AM   Result Value Ref Range    Sodium 140 136 - 145 mmol/L    Potassium 4.1 3.5 - 5.1 mmol/L    Chloride 100 95 - 110 mmol/L    CO2 30 (H) 23 - 29 mmol/L    Glucose 87 70 - 110 mg/dL    BUN 7 (L) 8 - 23 mg/dL    Creatinine 0.8 0.5 - 1.4 mg/dL    Calcium 9.4 8.7 - 10.5 mg/dL    Anion Gap 10 8 - 16 mmol/L    eGFR >60 >60 mL/min/1.73 m^2   Magnesium    Collection Time: 02/12/24  4:54 AM   Result Value Ref Range    Magnesium 1.9 1.6 - 2.6 mg/dL       Recent Labs     02/11/24  0620   WBC 4.13   HGB 10.5*   HCT 34.8*        Recent Labs     02/12/24  0454      K 4.1      CO2 30*   BUN 7*   GLU 87     No results for input(s): "ESR", "CRP" in the last 72 hours.     2/12/2024 9:36 AM       Imaging:  XR x-ray bilateral feet:  Right = demonstrates what appears to be chronic healing fractures with a valgus deformity of the metatarsal necks 3-5, there is a zone 3 metatarsal base fracture of the 5th metatarsal, significant arthritis throughout the midfoot, no dislocations.  Left = demonstrates arthritis throughout the mid and forefoot no fractures or dislocations.     Assessment/Plan:  68-year-old female with chronic and possibly acute fracture of the metatarsal next 3-5 and 5th metatarsal base respectively.    -no acute orthopedic intervention needed.    -hard-soled shoe  -weightbearing as tolerated  -PT/OT     Austyn Magdaleno MD         "

## 2024-02-12 NOTE — CONSULTS
[facility] Surg  Urology  Consult Note    Patient Name: Tasha Hawley  MRN: [id number]  Admission Date: 2/9/2024  Hospital Length of Stay: 2   Code Status: Full Code   Attending Provider:  Myles Meneses  Consulting Provider: HEVER Hale  Primary Care Physician: Mesfin Hodges II, MD  Principal Problem:Frequent falls    Inpatient consult to Urology  Consult performed by: Myles Meneses MD  Consult ordered by: Barber Allen MD          Subjective:     HPI:  68-year-old female with significant past medical history of chronic lymphedema, heart failure, and depression presented to the emergency department on 02/09/2024 for worsening lower extremity swelling, leaking from indwelling catheter, and repeated falls per . Urology was consulted to evaluate SPT. SPT was placed by Dr. Mayo. Patient reports that the SPT has not been changed in a very long time, and home health usually changes it. Reports that the SPT has been leaking from insertion site and that the balloon is inflated to 30cc. Denies fevers, chills, hemturia, flank pain.    Past Medical History:   Diagnosis Date    Anxiety     Arthritis     Bilateral lower extremity edema     severe chronic    Carotid artery occlusion     Cataract     CHF (congestive heart failure)     Coronary artery disease     subtotalled LAD with collateral    Depression     Fever blister     Hard of hearing     Hypokalemia 01/09/2023    Hyponatremia 02/04/2022    Hypothyroid     Iron deficiency anemia     Lumbar radiculopathy     with chronic pain    Ocular migraine     Other emphysema 05/22/2023    Renal disorder     Sleep apnea     cpap       Past Surgical History:   Procedure Laterality Date    ADENOIDECTOMY      BACK SURGERY      x 12    CARDIAC CATHETERIZATION  2016    subtotalled LAD with right to left collaterals    CATARACT EXTRACTION W/  INTRAOCULAR LENS IMPLANT Left     Dr Coleman     CYSTOSCOPIC LITHOLAPAXY N/A 6/27/2019    Procedure:  CYSTOLITHOLAPAXY;  Surgeon: Shireen Mayo MD;  Location: [facility] OR 2ND FLR;  Service: Urology;  Laterality: N/A;    CYSTOSCOPIC LITHOLAPAXY N/A 9/3/2019    Procedure: CYSTOLITHOLAPAXY;  Surgeon: Shireen Mayo MD;  Location: [facility] OR 2ND FLR;  Service: Urology;  Laterality: N/A;    CYSTOSCOPY N/A 7/13/2021    Procedure: CYSTOSCOPY;  Surgeon: Shireen Mayo MD;  Location: [facility] OR 1ST FLR;  Service: Urology;  Laterality: N/A;    CYSTOSCOPY  11/16/2021    Procedure: CYSTOSCOPY;  Surgeon: Shireen Mayo MD;  Location: [facility] OR 1ST FLR;  Service: Urology;;    CYSTOSCOPY  7/19/2022    Procedure: CYSTOSCOPY;  Surgeon: Shireen Mayo MD;  Location: [facility] OR 1ST FLR;  Service: Urology;;    CYSTOSCOPY WITH INJECTION OF PERIURETHRAL BULKING AGENT  7/19/2022    Procedure: CYSTOSCOPY, WITH PERIURETHRAL BULKING AGENT INJECTION-MACROPLASTIQUE;  Surgeon: Shireen Mayo MD;  Location: [facility] OR 1ST FLR;  Service: Urology;;    CYSTOSCOPY WITH INJECTION OF PERIURETHRAL BULKING AGENT N/A 3/28/2023    Procedure: CYSTOSCOPY, WITH PERIURETHRAL BULKING AGENT INJECTION;  Surgeon: Shireen Mayo MD;  [facility];  Service: Urology;  Laterality: N/A;  [place]    CYSTOSCOPY WITH INJECTION OF PERIURETHRAL BULKING AGENT N/A 11/14/2023    Procedure: CYSTOSCOPY, WITH PERIURETHRAL BULKING AGENT INJECTION;  Surgeon: Shireen Mayo MD;  Location: [facility] OR 1ST FLR;  Service: Urology;  Laterality: N/A;    CYSTOSCOPY,WITH BOTULINUM TOXIN INJECTION N/A 12/13/2022    Procedure: CYSTOSCOPY,WITH BOTULINUM TOXIN INJECTION;  Surgeon: Shireen Mayo MD;  Location: [facility] OR 1ST FLR;  Service: Urology;  Laterality: N/A;  300 U    CYSTOSCOPY,WITH BOTULINUM TOXIN INJECTION N/A 3/28/2023    Procedure: CYSTOSCOPY,WITH BOTULINUM TOXIN INJECTION;  Surgeon: Shireen Mayo MD;  Location: [facility] OR [address] [place] [address];  Service: Urology;  Laterality: N/A;  45 MIN.    300 UNITS    ESOPHAGOGASTRODUODENOSCOPY N/A 5/23/2018    Procedure:  ESOPHAGOGASTRODUODENOSCOPY (EGD);  Surgeon: Prince Vance MD;  Location: [facility] ENDO (4TH FLR);  Service: Endoscopy;  Laterality: N/A;  r/s 'd per Dr. Vance due to family emergency- ER    ESOPHAGOGASTRODUODENOSCOPY N/A 6/23/2023    Procedure: EGD (ESOPHAGOGASTRODUODENOSCOPY);  Surgeon: Juaquin Barry MD;  Location: [facility] ENDO (2ND FLR);  Service: Endoscopy;  Laterality: N/A;  Refferal: PRINCE VANCE  Order  tele enc 5/18/23  dx: history of a Nissen fundoplication  Additional Scheduling Information:  On home oxygen 2nd floor for airway protection also with a pain pump elevated BMI close to 40   Prep Gastroparesis   ins    HYSTERECTOMY  1975    endometriosis    INJECTION OF BOTULINUM TOXIN TYPE A  7/13/2021    Procedure: INJECTION, BOTULINUM TOXIN, 200units;  Surgeon: Shireen Mayo MD;  Location: [facility] OR 1ST FLR;  Service: Urology;;    INJECTION OF BOTULINUM TOXIN TYPE A  11/16/2021    Procedure: INJECTION, BOTULINUM TOXIN, 200units;  Surgeon: Shireen Mayo MD;  Location: [facility] OR 1ST FLR;  Service: Urology;;    INJECTION OF BOTULINUM TOXIN TYPE A  7/19/2022    Procedure: INJECTION, BOTULINUM TOXIN, 300 units ;  Surgeon: Shireen Mayo MD;  Location: [facility] OR 1ST FLR;  Service: Urology;;    INJECTION OF BOTULINUM TOXIN TYPE A N/A 11/14/2023    Procedure: INJECTION, BOTULINUM TOXIN, TYPE A;  Surgeon: Shireen Mayo MD;  Location: [facility] OR 1ST FLR;  Service: Urology;  Laterality: N/A;    INSERTION, SUPRAPUBIC CATHETER N/A 12/13/2022    Procedure: INSERTION, SUPRAPUBIC CATHETER;  Surgeon: Shireen Mayo MD;  Location: [facility] OR 1ST FLR;  Service: Urology;  Laterality: N/A;  exchange    INSERTION, SUPRAPUBIC CATHETER N/A 11/14/2023    Procedure: INSERTION, SUPRAPUBIC CATHETER;  Surgeon: Shireen Mayo MD;  Location: [facility] OR 1ST FLR;  Service: Urology;  Laterality: N/A;  EXCHANGE of s/p tube    pain pump placement      SQ Dilaudid Pump managed by Dr. Hillman, Pain Management    REMOVAL OF BONE  SPUR OF FOOT Bilateral 9/16/2022    Procedure: EXCISION ARTHRITIC BONE, BILATERAL FOOT;  Surgeon: Adam Mcguire DPM;  Location: [facility];  Service: Podiatry;  Laterality: Bilateral;    REPLACEMENT OF BACLOFEN PUMP N/A 8/2/2023    Procedure: REPLACEMENT, BACLOFEN PUMP;  Surgeon: Smitha Condon MD;  Location: [facility] OR 2ND FLR;  Service: Neurosurgery;  Laterality: N/A;  Anes: Gen  Blood: Type & Screen  Pos: Left Lat  Intrathecal Pump  Bed: Reg Reversed  Rad: C-arm  Pump: 40 cc, medtronics    REPLACEMENT OF CATHETER N/A 10/31/2019    Procedure: REPLACEMENT, CATHETER-SUPRAPUBIC;  Surgeon: Shireen Mayo MD;  Location: [facility] OR 1ST FLR;  Service: Urology;  Laterality: N/A;    SPINAL CORD STIMULATOR REMOVAL      before [place]    SPINE SURGERY  5-13-13    CERVICAL FUSION    TONSILLECTOMY         Review of patient's allergies indicates:   Allergen Reactions    (d)-limonene flavor      Other reaction(s): difficult intubation  Other reaction(s): Difficulty breathing    Benadryl [diphenhydramine hcl] Shortness Of Breath    Fentanyl Itching, Nausea And Vomiting and Swelling             Imitrex [sumatriptan succinate] Shortness Of Breath    Oxycodone-acetaminophen Shortness Of Breath    Sulfamethoxazole-trimethoprim Anaphylaxis    Topiramate Shortness Of Breath    Vancomycin Anaphylaxis     Rash    Butorphanol tartrate     Evening primrose (oenothera biennis)     Latex     Pregabalin Other (See Comments)     tremors    Propoxyphene n-acetaminophen      Other reaction(s): Difficulty breathing    Sumatriptan     White petrolatum-zinc     Zinc acetate     Zinc oxide-white petrolatum      Other reaction(s): Difficulty breathing    Latex, natural rubber Itching and Rash    Phenytoin Rash and Other (See Comments)     Trouble breathing       Family History       Problem Relation (Age of Onset)    Cancer Mother (55), Father    Cirrhosis Paternal Aunt, Paternal Uncle    Colon cancer Maternal Uncle    Esophageal cancer Father     "Heart disease Maternal Uncle    Liver disease Paternal Aunt, Paternal Uncle    No Known Problems Brother, Brother, Sister, Maternal Aunt, Maternal Grandfather, Paternal Grandmother, Paternal Grandfather    Parkinsonism Maternal Grandmother    Tremor Maternal Grandmother            Tobacco Use    Smoking status: Never    Smokeless tobacco: Never   Substance and Sexual Activity    Alcohol use: Never    Drug use: Yes     Types: Marijuana     Comment: "medical marijuana gummies"    Sexual activity: Not on file       Review of Systems   Constitutional:  Negative for chills, diaphoresis and fever.   Respiratory:  Negative for shortness of breath.    Cardiovascular:  Negative for chest pain.   Genitourinary:  Negative for decreased urine volume, flank pain, hematuria and pelvic pain.        Leakage from SPT insertion site.   Neurological:  Positive for weakness. Negative for dizziness.       Objective:     Temp:  [97.5 °F (36.4 °C)-98.1 °F (36.7 °C)] 97.7 °F (36.5 °C)  Pulse:  [60-79] 60  Resp:  [17-22] 18  SpO2:  [94 %-98 %] 98 %  BP: ()/(50-58) 108/57  Weight: 105.8 kg (233 lb 4 oz)  Body mass index is 36.53 kg/m².    Date 02/12/24 0700 - 02/13/24 0659   Shift 3497-3959 9902-9213 9787-2149 24 Hour Total   INTAKE   P.O. 240   240   Shift Total(mL/kg) 240(2.3)   240(2.3)   OUTPUT   Urine(mL/kg/hr) 700   700   Shift Total(mL/kg) 700(6.6)   700(6.6)   Weight (kg) 105.8 105.8 105.8 105.8          Drains       Drain  Duration                  Suprapubic Catheter 11/14/23 100% silicone 20 Fr. 90 days                     Physical Exam  Constitutional:       Appearance: Normal appearance.   Pulmonary:      Effort: Pulmonary effort is normal.   Abdominal:      General: Abdomen is flat.      Palpations: Abdomen is soft.   Genitourinary:     General: Normal vulva.      Rectum: Normal.      Comments: Suprapubic area positive for erythema between abdominal folds and by SPT insertion site  Musculoskeletal:         General: Normal " "range of motion.      Cervical back: Normal range of motion.   Skin:     Comments: Bilateral lower extremities swollen, redness.   Neurological:      Mental Status: She is alert and oriented to person, place, and time.      Motor: Weakness present.   Psychiatric:         Mood and Affect: Mood normal.         Behavior: Behavior normal.          Significant Labs:    BMP:  Recent Labs   Lab 02/10/24  0501 02/11/24  0620 02/12/24  0454    141 140   K 3.9 3.6 4.1    101 100   CO2 30* 31* 30*   BUN 6* 5* 7*   CREATININE 0.8 0.8 0.8   CALCIUM 8.4* 8.5* 9.4       CBC:  Recent Labs   Lab 02/09/24  1605 02/10/24  0501 02/11/24  0620   WBC 5.25 4.49 4.13   HGB 11.4* 10.5* 10.5*   HCT 36.1* 34.5* 34.8*    227 230       Blood Culture: No results for input(s): "LABBLOO" in the last 168 hours.  CMP:   Recent Labs   Lab 02/10/24  0501 02/11/24  0620 02/12/24  0454    101 87    141 140   K 3.9 3.6 4.1    101 100   CO2 30* 31* 30*   BUN 6* 5* 7*   CREATININE 0.8 0.8 0.8   CALCIUM 8.4* 8.5* 9.4   MG  --   --  1.9     Urine Culture:   Recent Labs   Lab 02/09/24  1708   LABURIN ESCHERICHIA COLI  >100,000 cfu/ml  *       Significant Imaging:  All pertinent imaging results/findings from the past 24 hours have been reviewed.                    Assessment and Plan:     Neurogenic bladder  20 fr SPT in place, instilled 30cc into SPT without difficulty, deflated 20cc of sterile water from balloon and SPT was removed without difficulty. Using sterile technique the SPT insertion site was cleaned with betadine and then a new 20fr coude was placed into SPT insertion site, urine was seen and balloon was inflated with 30cc of sterile water, meadows catheter was connected to leg bag draining urine.  Patient was asked if she would like to follow-up with Dr. Mayo for changes of SPT, patient wishes to continue letting home health do the SPT changes.        VTE Risk Mitigation (From admission, onward)           " Ordered     enoxaparin injection 40 mg  Every 24 hours         02/11/24 0840     IP VTE HIGH RISK PATIENT  Once         02/09/24 1811     Place sequential compression device  Until discontinued         02/09/24 1811                    Thank you for your consult. I will sign off. Please contact us if you have any additional questions.    HEVER Hale  Urology  [facility] Surg

## 2024-02-12 NOTE — ASSESSMENT & PLAN NOTE
Frequent falls over the past few weeks  Has been unable to take care of the patient  Patient known to me from the past - very reluctant to placement & typically requests discharge home in the past    Plan:  PT/OT consult - prior h/o metatarsal fractures. bilateral foot xray ordered to determine WB status - ?new non displaced fracture, ortho consult - no intervention indicated, WBAT, hard sole shoe  Case management consult to assist with placement

## 2024-02-12 NOTE — PLAN OF CARE
Pt safety maintained. Pt on 2L o2 via NC. Medication administered per MAR. Pt instructed to call w/any needs, verbalized understanding. Bed in lowest position, locked and bed alarms on. Call bell w/in pt's reach.     Problem: Adult Inpatient Plan of Care  Goal: Plan of Care Review  Outcome: Ongoing, Progressing  Goal: Patient-Specific Goal (Individualized)  Outcome: Ongoing, Progressing  Goal: Absence of Hospital-Acquired Illness or Injury  Outcome: Ongoing, Progressing  Goal: Optimal Comfort and Wellbeing  Outcome: Ongoing, Progressing     Problem: Impaired Wound Healing  Goal: Optimal Wound Healing  Outcome: Ongoing, Progressing     Problem: Skin Injury Risk Increased  Goal: Skin Health and Integrity  Outcome: Ongoing, Progressing

## 2024-02-12 NOTE — PLAN OF CARE
Problem: Physical Therapy  Goal: Physical Therapy Goal  Description: Goals to be met by: 3/10/2024    Patient will increase functional independence with mobility by performin. Bed mobility with Mod independence  2. Supine<>sit with Mod assist.       Outcome: Ongoing, Progressing     Pt participated actively throughout session.  Cont toward achieving PT POC goals and progress with gait training as able with MD clearance for BLE WBAT in [place] shoes use.  Rec cont acute therapy participation and upon discharge Moderate Intensity Therapy to increase pt's safety w mobility and decrease her fall risk and readmission likelihood.

## 2024-02-12 NOTE — ASSESSMENT & PLAN NOTE
UA with pyuria  Possible colonization due to suprapubic catheter but pt reports weakness which she usually has with UTIs    Continue ceftriaxone, abx X 7 days. Urine cx growing Ecoli  Urology consult for suprapubic catheter exchange. S/p catheter exchange on 2/12

## 2024-02-12 NOTE — SUBJECTIVE & OBJECTIVE
Interval History: c/o pain & swelling in legs.  on the video call during the encounter.     Review of Systems  Objective:     Vital Signs (Most Recent):  Temp: 97.6 °F (36.4 °C) (02/12/24 1539)  Pulse: 65 (02/12/24 1539)  Resp: 18 (02/12/24 1539)  BP: (!) 109/53 (02/12/24 1539)  SpO2: 97 % (02/12/24 1539) Vital Signs (24h Range):  Temp:  [97.6 °F (36.4 °C)-98.1 °F (36.7 °C)] 97.6 °F (36.4 °C)  Pulse:  [60-79] 65  Resp:  [17-22] 18  SpO2:  [94 %-98 %] 97 %  BP: ()/(50-58) 109/53     Weight: 105.8 kg (233 lb 4 oz)  Body mass index is 36.53 kg/m².    Intake/Output Summary (Last 24 hours) at 2/12/2024 1543  Last data filed at 2/12/2024 0857  Gross per 24 hour   Intake 480 ml   Output 700 ml   Net -220 ml           Physical Exam  Vitals and nursing note reviewed.   Constitutional:       General: She is not in acute distress.  Cardiovascular:      Rate and Rhythm: Normal rate and regular rhythm.   Pulmonary:      Effort: Pulmonary effort is normal. No respiratory distress.   Abdominal:      Palpations: Abdomen is soft.      Tenderness: There is no abdominal tenderness.   Genitourinary:     Comments: Suprapubic catheter in place  Musculoskeletal:      Right lower leg: Edema present.      Left lower leg: Edema present.   Skin:     General: Skin is warm and dry.      Comments: Erythematous b/l legs   Neurological:      General: No focal deficit present.      Mental Status: She is alert and oriented to person, place, and time.   Psychiatric:         Mood and Affect: Mood normal.             Significant Labs: All pertinent labs within the past 24 hours have been reviewed.    Significant Imaging: I have reviewed all pertinent imaging results/findings within the past 24 hours.

## 2024-02-12 NOTE — CONSULTS
"[place] [facility] Surg  Wound Care    Patient Name:  Tasha Hawley   MRN:  [id number]  Date: 2/12/2024  Diagnosis: Frequent falls    History:     Past Medical History:   Diagnosis Date    Anxiety     Arthritis     Bilateral lower extremity edema     severe chronic    Carotid artery occlusion     Cataract     CHF (congestive heart failure)     Coronary artery disease     subtotalled LAD with collateral    Depression     Fever blister     Hard of hearing     Hypokalemia 01/09/2023    Hyponatremia 02/04/2022    Hypothyroid     Iron deficiency anemia     Lumbar radiculopathy     with chronic pain    Ocular migraine     Other emphysema 05/22/2023    Renal disorder     Sleep apnea     cpap       Social History     Socioeconomic History    Marital status:    Tobacco Use    Smoking status: Never    Smokeless tobacco: Never   Substance and Sexual Activity    Alcohol use: Never    Drug use: Yes     Types: Marijuana     Comment: "medical marijuana gummies"     Social Determinants of Health     Financial Resource Strain: Low Risk  (9/11/2023)    Overall Financial Resource Strain (CARDIA)     Difficulty of Paying Living Expenses: Not hard at all   Food Insecurity: No Food Insecurity (9/11/2023)    Hunger Vital Sign     Worried About Running Out of Food in the Last Year: Never true     Ran Out of Food in the Last Year: Never true   Transportation Needs: No Transportation Needs (9/11/2023)    PRAPARE - Transportation     Lack of Transportation (Medical): No     Lack of Transportation (Non-Medical): No   Physical Activity: Inactive (9/11/2023)    Exercise Vital Sign     Days of Exercise per Week: 0 days     Minutes of Exercise per Session: 0 min   Stress: Stress Concern Present (9/11/2023)    [place] [place] [employer] - Occupational Stress Questionnaire     Feeling of Stress : To some extent   Social Connections: Moderately Isolated (9/11/2023)    Social Connection and Isolation Panel [NHANES]     Frequency of " Communication with Friends and Family: More than three times a week     Frequency of Social Gatherings with Friends and Family: Three times a week     Attends Yazidism Services: Never     Active Member of Clubs or Organizations: No     Attends Club or Organization Meetings: Never     Marital Status:    Housing Stability: Low Risk  (9/11/2023)    Housing Stability Vital Sign     Unable to Pay for Housing in the Last Year: No     Number of Places Lived in the Last Year: 1     Unstable Housing in the Last Year: No       Precautions:     Allergies as of 02/09/2024 - Reviewed 02/09/2024   Allergen Reaction Noted    (d)-limonene flavor  09/05/2012    Benadryl [diphenhydramine hcl] Shortness Of Breath 06/08/2018    Fentanyl Itching, Nausea And Vomiting, and Swelling 09/05/2012    Imitrex [sumatriptan succinate] Shortness Of Breath 01/03/2013    Oxycodone-acetaminophen Shortness Of Breath 04/22/2015    Sulfamethoxazole-trimethoprim Anaphylaxis 09/05/2012    Topiramate Shortness Of Breath 01/03/2013    Vancomycin Anaphylaxis 09/05/2012    Butorphanol tartrate  10/25/2016    Evening primrose (oenothera biennis)  09/25/2022    Latex  11/08/2023    Pregabalin Other (See Comments) 04/03/2023    Propoxyphene n-acetaminophen  09/05/2012    Sumatriptan  11/08/2023    White petrolatum-zinc  01/23/2017    Zinc acetate  11/08/2023    Zinc oxide-white petrolatum  09/05/2012    Latex, natural rubber Itching and Rash 02/19/2013    Phenytoin Rash and Other (See Comments) 10/02/2018       [facility] Assessment Details/Treatment       Pt known to wound care nurse from previous admissions- history of lymphedema    BLE with scattered partial thickness unroofed blisters related to edema- mild erythema     Recommendations discussed with pt, nurse and Dr. Sullivan:  - Pressure injury prevention interventions   - Lite calamine compressions wraps to manage edema- change 1-2x/week with Hydrofera blue ready dressing to blisters of  BLE    02/12/2024

## 2024-02-12 NOTE — PT/OT/SLP PROGRESS
Occupational Therapy   Treatment    Name: Tasha Hawley  MRN: [id number]  Admitting Diagnosis:  Frequent falls       Recommendations:     Discharge Recommendations: Moderate Intensity Therapy  Discharge Equipment Recommendations:  bath bench, wheelchair  Barriers to discharge:  Decreased caregiver support (due to level of assistance needed)    Assessment:     Tasha Hawley is a 68 y.o. female with a medical diagnosis of Frequent falls.  She presents with the following performance deficits affecting function: weakness, impaired endurance, impaired sensation, impaired self care skills, impaired functional mobility, gait instability, impaired balance, impaired cognition, decreased coordination, decreased lower extremity function, decreased upper extremity function, decreased safety awareness, pain, decreased ROM, impaired coordination, impaired skin, edema, orthopedic precautions. Pt was agreeable to OT/PT session and was noted to make progress towards her goals in therapy.  Pt continues with pain in B feet, but noted with improved bed mobility skills.  Orthopedic MD entered room and clarified that pt is WBAT on B LEs with hard sole shoes.  Pt's goals remain appropriate at this time.  She will continue to benefit from skilled OT services in order to assist her with increasing her safety and level of independence with self care and mobility tasks.       Rehab Prognosis:  Fair; patient would benefit from acute skilled OT services to address these deficits and reach maximum level of function.       Plan:     Patient to be seen 3 x/week to address the above listed problems via self-care/home management, therapeutic activities, therapeutic exercises  Plan of Care Expires: 03/11/24  Plan of Care Reviewed with: patient    Subjective     Chief Complaint: pain in B feet  Patient/Family Comments/goals: return home - pt is fearful of going to NH  Pain/Comfort:  Pain Rating 1: 9/10  Location - Side 1: Bilateral  Location 1:  foot  Pain Addressed 1: Reposition, Distraction, Pre-medicate for activity  Pain Rating Post-Intervention 1: 9/10    Objective:     Communicated with: nurse prior to session.  Patient found HOB elevated with suprapubic catheter, oxygen, bed alarm, telemetry upon OT entry to room.    General Precautions: Standard, fall    Orthopedic Precautions: (BLE WBAT using hard soled shoes)  Braces: N/A  Respiratory Status: Nasal cannula, flow 2 L/min     Occupational Performance:     Bed Mobility:    Patient completed Scooting/Bridging with minimum assistance  Patient completed Supine to Sit with stand by assistance  Patient completed Sit to Supine with Min - Mod A      Functional Mobility/Transfers:  Stand = N/A - WB status update not given until end of session      Activities of Daily Living:  Grooming: set up A sitting EOB      AMPAC 6 Click ADL: 15    Treatment & Education:  Pt completed ADLs and func mobility activities for tx session as noted above  Pt sat EOB and performed UE/LE AROM exercises for 1 set of 12 - pt alternated UE/LE - pt c/o some pain in L shoulder during shoulder flexion  Pt educated on role of OT and POC      Patient left HOB elevated with all lines intact, call button in reach, bed alarm on, and MD present    GOALS:   Multidisciplinary Problems       Occupational Therapy Goals          Problem: Occupational Therapy    Goal Priority Disciplines Outcome Interventions   Occupational Therapy Goal     OT, PT/OT Ongoing, Progressing    Description: Goals to be met by: 03/11/24     Patient will increase functional independence with ADLs by performing:    UE Dressing with Modified [place].  Grooming while seated at sink with Modified [place].  Upper extremity exercise program 3x15 reps per handout, with assistance as needed.                         Time Tracking:     OT Date of Treatment: 02/12/24  OT Start Time: 0922  OT Stop Time: 0948  OT Total Time (min): 26 min cotx with PT    Billable  Minutes:Therapeutic Activity 11  Therapeutic Exercise 15    OT/SAMANTHA: OT          2/12/2024

## 2024-02-12 NOTE — PROGRESS NOTES
[facility] Medicine  Progress Note    Patient Name: Tasha Hawley  MRN: [id number]  Patient Class: IP- Inpatient   Admission Date: 2/9/2024  Length of Stay: 2 days  Attending Physician: Bharti Sullivan MD  Primary Care Provider: Mesfin Hodges II, MD        Subjective:     Principal Problem:Frequent falls        HPI:  Tasha Hawley is a 68-year-old female with past medical history of chronic lymphedema, heart failure, depression who presents due to worsening lower extremity swelling, leaking from indwelling catheter, and repeated falls per .     is at bedside and provides much of the history.  He reports that over the past few weeks, his wife has been having multiple falls due to unsteady gait partly due to her worsening lower extremity edema.  He reports despite taking the oral Lasix, patient has lower extremity edema,which has worsened with blistering occurring.  Also reports some chest pain with shortness of breath.  He wishes for possible temporary placement due to difficulty taking care of her.    He also was concerned that she has not had replacement of her indwelling suprapubic catheter.  He reports consistent leaking and wishes to get that addressed.  Patient presents for further evaluation.    Triage vitals were significant for hypoxemia.  Review of systems significant for leg swelling.  Physical exam significant for lower extremity swelling and suprapubic catheter without significant erythema in surrounding skin.    CBC significant for normocytic anemia.  CMP significant for elevated ALP.  BNP and troponin negative.  UA with bacteria and WBCs.  Urine culture pending.    CT head without contrast is negative for any acute abnormality.    Chest x-ray concerning for hiatal hernia but no obvious cardiopulmonary infectious etiology noted.    Patient admitted for falls, UA, diuresis and lower extremity wound management.      Overview/Hospital Course:  No notes on  file    Interval History: c/o pain & swelling in legs.  on the video call during the encounter.     Review of Systems  Objective:     Vital Signs (Most Recent):  Temp: 97.6 °F (36.4 °C) (02/12/24 1539)  Pulse: 65 (02/12/24 1539)  Resp: 18 (02/12/24 1539)  BP: (!) 109/53 (02/12/24 1539)  SpO2: 97 % (02/12/24 1539) Vital Signs (24h Range):  Temp:  [97.6 °F (36.4 °C)-98.1 °F (36.7 °C)] 97.6 °F (36.4 °C)  Pulse:  [60-79] 65  Resp:  [17-22] 18  SpO2:  [94 %-98 %] 97 %  BP: ()/(50-58) 109/53     Weight: 105.8 kg (233 lb 4 oz)  Body mass index is 36.53 kg/m².    Intake/Output Summary (Last 24 hours) at 2/12/2024 1543  Last data filed at 2/12/2024 0857  Gross per 24 hour   Intake 480 ml   Output 700 ml   Net -220 ml           Physical Exam  Vitals and nursing note reviewed.   Constitutional:       General: She is not in acute distress.  Cardiovascular:      Rate and Rhythm: Normal rate and regular rhythm.   Pulmonary:      Effort: Pulmonary effort is normal. No respiratory distress.   Abdominal:      Palpations: Abdomen is soft.      Tenderness: There is no abdominal tenderness.   Genitourinary:     Comments: Suprapubic catheter in place  Musculoskeletal:      Right lower leg: Edema present.      Left lower leg: Edema present.   Skin:     General: Skin is warm and dry.      Comments: Erythematous b/l legs   Neurological:      General: No focal deficit present.      Mental Status: She is alert and oriented to person, place, and time.   Psychiatric:         Mood and Affect: Mood normal.             Significant Labs: All pertinent labs within the past 24 hours have been reviewed.    Significant Imaging: I have reviewed all pertinent imaging results/findings within the past 24 hours.    Assessment/Plan:      * Frequent falls  Frequent falls over the past few weeks  Has been unable to take care of the patient  Patient known to me from the past - very reluctant to placement & typically requests discharge home in the  past    Plan:  PT/OT consult - prior h/o metatarsal fractures. bilateral foot xray ordered to determine WB status - ?new non displaced fracture, ortho consult - no intervention indicated, WBAT, hard sole shoe  Case management consult to assist with placement    Chronic hypoxic respiratory failure  Patient with Hypercapnic and Hypoxic Respiratory failure which is Chronic.  she is on home oxygen at 2-3 LPM.     .       Adrenal insufficiency  Continue with the patient's home regimen      Normocytic anemia  Patient's anemia is currently controlled. Has not received any PRBCs to date. Etiology likely d/t Iron deficiency  Current CBC reviewed-   Lab Results   Component Value Date    HGB 10.5 (L) 02/11/2024    HCT 34.8 (L) 02/11/2024     Monitor serial CBC and transfuse if patient becomes hemodynamically unstable, symptomatic or H/H drops below 7/21.    UTI (urinary tract infection)  UA with pyuria  Possible colonization due to suprapubic catheter but pt reports weakness which she usually has with UTIs    Continue ceftriaxone, abx X 7 days. Urine cx growing Ecoli  Urology consult for suprapubic catheter exchange. S/p catheter exchange on 2/12      Suprapubic catheter  noted      Lymphedema of both lower extremities  Significant lymphedema in bilateral extremities  Blistering noted on both    IV diuresis (dose lowered due to hypotension)  Wound care      Hypothyroidism (acquired)  Unclear patient was taking both Synthroid and Cytomel  Continue home regimen    Severe obesity (BMI 35.0-39.9) with comorbidity  Body mass index is 37.67 kg/m². Morbid obesity complicates all aspects of disease management from diagnostic modalities to treatment. Weight loss encouraged and health benefits explained to patient.         Narcotic dependency, continuous  Pt has a SC dilaudid pump managed by pain clinic, Dr. Hillman    Pt requesting for IV dilaudid. Discussed with refrain from parenteral narcotics given the SC dilaudid pump.   Pt in  agreement with low dose PRN oxycodone, tylenol for breakthrough pain      Generalized anxiety disorder  Continue with patient's home medications        VTE Risk Mitigation (From admission, onward)           Ordered     enoxaparin injection 40 mg  Every 24 hours         02/11/24 0840     IP VTE HIGH RISK PATIENT  Once         02/09/24 1811     Place sequential compression device  Until discontinued         02/09/24 1811                    Discharge Planning   JACKIE:      Code Status: Full Code   Is the patient medically ready for discharge?:     Reason for patient still in hospital (select all that apply): Patient trending condition and Pending disposition  Discharge Plan A:  (TBD)            Bharti Sullivan MD  Department of Hospital Medicine   [facility] S[facility]

## 2024-02-12 NOTE — ASSESSMENT & PLAN NOTE
20 fr SPT in place, instilled 30cc into SPT without difficulty, deflated 20cc of sterile water from balloon and SPT was removed without difficulty. Using sterile technique the SPT insertion site was cleaned with betadine and then a new 20fr coude was placed into SPT insertion site, urine was seen and balloon was inflated with 30cc of sterile water, meadows catheter was connected to leg bag draining urine.  Patient was asked if she would like to follow-up with Dr. Mayo for changes of SPT, patient wishes to continue letting home health do the SPT changes.

## 2024-02-12 NOTE — PROGRESS NOTES
[facility]  Urology  Progress Note    Patient Name: Tasha Hawley  MRN: [id number]  Admission Date: 2/9/2024  Hospital Length of Stay: 2 days  Code Status: Full Code   Attending Provider: Bharti Sullivan MD   Primary Care Physician: Mesfin Hodges II, MD    Subjective:     HPI:  68-year-old female with significant past medical history of chronic lymphedema, heart failure, and depression presented to the emergency department on 02/09/2024 for worsening lower extremity swelling, leaking from indwelling catheter, and repeated falls per . Urology was consulted to evaluate SPT. SPT was placed by Dr. Mayo. Patient reports that the SPT has not been changed in a very long time, and home health usually changes it. Reports that the SPT has been leaking from insertion site and that the balloon is inflated to 30cc. Denies fevers, chills, hemturia, flank pain.    No new subjective & objective note has been filed under this hospital service since the last note was generated.      Assessment/Plan:     Neurogenic bladder  20 fr SPT in place, instilled 30cc into SPT without difficulty, deflated 20cc of sterile water from balloon and SPT was removed without difficulty. Using sterile technique the SPT insertion site was cleaned with betadine and then a new 20fr coude was placed into SPT insertion site, urine was seen and balloon was inflated with 30cc of sterile water, meadows catheter was connected to leg bag draining urine.  Patient was asked if she would like to follow-up with Dr. Mayo for changes of SPT, patient wishes to continue letting home health do the SPT changes.        VTE Risk Mitigation (From admission, onward)           Ordered     enoxaparin injection 40 mg  Every 24 hours         02/11/24 0840     IP VTE HIGH RISK PATIENT  Once         02/09/24 1811     Place sequential compression device  Until discontinued         02/09/24 1811                    HEVER Hale  Urology  [facility]

## 2024-02-12 NOTE — HPI
68-year-old female with significant past medical history of chronic lymphedema, heart failure, and depression presented to the emergency department on 02/09/2024 for worsening lower extremity swelling, leaking from indwelling catheter, and repeated falls per . Urology was consulted to evaluate SPT. SPT was placed by Dr. Mayo. Patient reports that the SPT has not been changed in a very long time, and home health usually changes it. Reports that the SPT has been leaking from insertion site and that the balloon is inflated to 30cc. Denies fevers, chills, hemturia, flank pain.

## 2024-02-13 LAB
BASOPHILS # BLD AUTO: 0.01 K/UL (ref 0–0.2)
BASOPHILS NFR BLD: 0.2 % (ref 0–1.9)
DIFFERENTIAL METHOD BLD: ABNORMAL
EOSINOPHIL # BLD AUTO: 0.4 K/UL (ref 0–0.5)
EOSINOPHIL NFR BLD: 6.8 % (ref 0–8)
ERYTHROCYTE [DISTWIDTH] IN BLOOD BY AUTOMATED COUNT: 15.7 % (ref 11.5–14.5)
HCT VFR BLD AUTO: 36.6 % (ref 37–48.5)
HGB BLD-MCNC: 11.2 G/DL (ref 12–16)
IMM GRANULOCYTES # BLD AUTO: 0.01 K/UL (ref 0–0.04)
IMM GRANULOCYTES NFR BLD AUTO: 0.2 % (ref 0–0.5)
LYMPHOCYTES # BLD AUTO: 0.9 K/UL (ref 1–4.8)
LYMPHOCYTES NFR BLD: 17.3 % (ref 18–48)
MCH RBC QN AUTO: 27.3 PG (ref 27–31)
MCHC RBC AUTO-ENTMCNC: 30.6 G/DL (ref 32–36)
MCV RBC AUTO: 89 FL (ref 82–98)
MONOCYTES # BLD AUTO: 0.4 K/UL (ref 0.3–1)
MONOCYTES NFR BLD: 7.3 % (ref 4–15)
NEUTROPHILS # BLD AUTO: 3.6 K/UL (ref 1.8–7.7)
NEUTROPHILS NFR BLD: 68.2 % (ref 38–73)
NRBC BLD-RTO: 0 /100 WBC
PLATELET # BLD AUTO: 214 K/UL (ref 150–450)
PMV BLD AUTO: 9.4 FL (ref 9.2–12.9)
RBC # BLD AUTO: 4.11 M/UL (ref 4–5.4)
WBC # BLD AUTO: 5.33 K/UL (ref 3.9–12.7)

## 2024-02-13 PROCEDURE — 97110 THERAPEUTIC EXERCISES: CPT | Mod: HCNC

## 2024-02-13 PROCEDURE — 94761 N-INVAS EAR/PLS OXIMETRY MLT: CPT | Mod: HCNC

## 2024-02-13 PROCEDURE — 85025 COMPLETE CBC W/AUTO DIFF WBC: CPT | Mod: HCNC | Performed by: STUDENT IN AN ORGANIZED HEALTH CARE EDUCATION/TRAINING PROGRAM

## 2024-02-13 PROCEDURE — 99900035 HC TECH TIME PER 15 MIN (STAT): Mod: HCNC

## 2024-02-13 PROCEDURE — 63600175 PHARM REV CODE 636 W HCPCS: Mod: HCNC | Performed by: STUDENT IN AN ORGANIZED HEALTH CARE EDUCATION/TRAINING PROGRAM

## 2024-02-13 PROCEDURE — 11000001 HC ACUTE MED/SURG PRIVATE ROOM: Mod: HCNC

## 2024-02-13 PROCEDURE — 25000003 PHARM REV CODE 250: Mod: HCNC | Performed by: HOSPITALIST

## 2024-02-13 PROCEDURE — 25000003 PHARM REV CODE 250: Mod: HCNC | Performed by: STUDENT IN AN ORGANIZED HEALTH CARE EDUCATION/TRAINING PROGRAM

## 2024-02-13 PROCEDURE — 36415 COLL VENOUS BLD VENIPUNCTURE: CPT | Mod: HCNC | Performed by: STUDENT IN AN ORGANIZED HEALTH CARE EDUCATION/TRAINING PROGRAM

## 2024-02-13 PROCEDURE — 27000221 HC OXYGEN, UP TO 24 HOURS: Mod: HCNC

## 2024-02-13 PROCEDURE — 94640 AIRWAY INHALATION TREATMENT: CPT | Mod: HCNC

## 2024-02-13 PROCEDURE — 97116 GAIT TRAINING THERAPY: CPT | Mod: HCNC

## 2024-02-13 RX ORDER — HYDROCODONE BITARTRATE AND ACETAMINOPHEN 10; 325 MG/1; MG/1
1 TABLET ORAL EVERY 6 HOURS PRN
Status: DISCONTINUED | OUTPATIENT
Start: 2024-02-13 | End: 2024-02-19 | Stop reason: HOSPADM

## 2024-02-13 RX ADMIN — OXYCODONE HYDROCHLORIDE 5 MG: 5 TABLET ORAL at 10:02

## 2024-02-13 RX ADMIN — CEFTRIAXONE 1 G: 1 INJECTION, POWDER, FOR SOLUTION INTRAMUSCULAR; INTRAVENOUS at 10:02

## 2024-02-13 RX ADMIN — QUETIAPINE FUMARATE 200 MG: 100 TABLET ORAL at 10:02

## 2024-02-13 RX ADMIN — FUROSEMIDE 20 MG: 10 INJECTION, SOLUTION INTRAMUSCULAR; INTRAVENOUS at 09:02

## 2024-02-13 RX ADMIN — OXYCODONE HYDROCHLORIDE 5 MG: 5 TABLET ORAL at 04:02

## 2024-02-13 RX ADMIN — Medication 2 TABLET: at 10:02

## 2024-02-13 RX ADMIN — MUPIROCIN: 20 OINTMENT TOPICAL at 09:02

## 2024-02-13 RX ADMIN — HYDROCORTISONE 10 MG: 5 TABLET ORAL at 10:02

## 2024-02-13 RX ADMIN — POLYETHYLENE GLYCOL 3350 17 G: 17 POWDER, FOR SOLUTION ORAL at 09:02

## 2024-02-13 RX ADMIN — ATORVASTATIN CALCIUM 80 MG: 40 TABLET, FILM COATED ORAL at 09:02

## 2024-02-13 RX ADMIN — LEVOTHYROXINE SODIUM 150 MCG: 25 TABLET ORAL at 06:02

## 2024-02-13 RX ADMIN — SENNOSIDES 2 TABLET: 8.6 TABLET, FILM COATED ORAL at 09:02

## 2024-02-13 RX ADMIN — ACETAMINOPHEN 650 MG: 325 TABLET ORAL at 02:02

## 2024-02-13 RX ADMIN — FLUTICASONE PROPIONATE 100 MCG: 50 SPRAY, METERED NASAL at 09:02

## 2024-02-13 RX ADMIN — OXYBUTYNIN CHLORIDE 5 MG: 5 TABLET, EXTENDED RELEASE ORAL at 09:02

## 2024-02-13 RX ADMIN — ENOXAPARIN SODIUM 40 MG: 40 INJECTION SUBCUTANEOUS at 04:02

## 2024-02-13 RX ADMIN — Medication 2 TABLET: at 09:02

## 2024-02-13 RX ADMIN — DULOXETINE 60 MG: 30 CAPSULE, DELAYED RELEASE ORAL at 10:02

## 2024-02-13 RX ADMIN — ASPIRIN 81 MG: 81 TABLET, COATED ORAL at 09:02

## 2024-02-13 RX ADMIN — DULOXETINE 60 MG: 30 CAPSULE, DELAYED RELEASE ORAL at 09:02

## 2024-02-13 RX ADMIN — FLUDROCORTISONE ACETATE 100 MCG: 0.1 TABLET ORAL at 09:02

## 2024-02-13 RX ADMIN — TIOTROPIUM BROMIDE INHALATION SPRAY 2 PUFF: 3.12 SPRAY, METERED RESPIRATORY (INHALATION) at 09:02

## 2024-02-13 RX ADMIN — TRAZODONE HYDROCHLORIDE 300 MG: 100 TABLET ORAL at 10:02

## 2024-02-13 RX ADMIN — HYDROCODONE BITARTRATE AND ACETAMINOPHEN 1 TABLET: 10; 325 TABLET ORAL at 04:02

## 2024-02-13 NOTE — ASSESSMENT & PLAN NOTE
Continue with the patient's home regimen    Consider increasing to stress dose while hospitalized. However pt is currently asymptomatic and HDS

## 2024-02-13 NOTE — ASSESSMENT & PLAN NOTE
Frequent falls over the past few weeks  Has been unable to take care of the patient  Patient has a history of being reluctant to placement & typically requests discharge home in the past    Plan:  PT/OT consult - prior h/o metatarsal fractures. bilateral foot xray ordered to determine WB status - ?new non displaced fracture, ortho consult - no intervention indicated, WBAT, hard sole shoe  Case management consult to assist with placement

## 2024-02-13 NOTE — ASSESSMENT & PLAN NOTE
Body mass index is 36.53 kg/m². Morbid obesity complicates all aspects of disease management from diagnostic modalities to treatment. Weight loss encouraged and health benefits explained to patient.

## 2024-02-13 NOTE — PLAN OF CARE
Pt safety maintained. Pt on 3L O2 via NC. Medication administered per MAR. Bilateral lower extremity dressings CDI. Pt w/2 BM overnight. Pt encouraged to change positions frequently, verbalized understanding. Pt instructed to call w/any needs, verbalized understanding. Bed in lowest position, locked and bed alarms on. Call light w/in pt's reach.     Problem: Adult Inpatient Plan of Care  Goal: Plan of Care Review  Outcome: Ongoing, Progressing  Goal: Patient-Specific Goal (Individualized)  Outcome: Ongoing, Progressing  Goal: Optimal Comfort and Wellbeing  Outcome: Ongoing, Progressing  Goal: Readiness for Transition of Care  Outcome: Ongoing, Progressing     Problem: Impaired Wound Healing  Goal: Optimal Wound Healing  Outcome: Ongoing, Progressing     Problem: Skin Injury Risk Increased  Goal: Skin Health and Integrity  Outcome: Ongoing, Progressing

## 2024-02-13 NOTE — ASSESSMENT & PLAN NOTE
Present on admission - CAUTI  UA with pyuria  Possible colonization due to suprapubic catheter but pt reports weakness which she usually has with UTIs    Continue ceftriaxone, abx X 7 days. Urine cx growing Ecoli  Urology consult for suprapubic catheter exchange. S/p catheter exchange on 2/12

## 2024-02-13 NOTE — ASSESSMENT & PLAN NOTE
Pt has a SC dilaudid pump managed by pain clinic, Dr. Hillman    Pt requesting for IV dilaudid. Discussed with refrain from parenteral narcotics given the SC dilaudid pump.   Pt in agreement with low dose PRN oxycodone, tylenol for breakthrough pain    Will continue to avoid IV narcotics. However, Per pharmacy, pump change is due in 3 days which cannot be done in the hospital so once dilaudid pump is empty can get equivalent SC dilaudid.

## 2024-02-13 NOTE — PLAN OF CARE
Problem: Physical Therapy  Goal: Physical Therapy Goal  Description: Goals to be met by: 3/10/2024    Patient will increase functional independence with mobility by performin. Bed mobility with Mod independence  2. Supine<>sit with Mod assist.    Revised Goals to be met by discharge date:  1.  Bed mobility with Mod Ind  2.  Txfs sup<>sit with SBA  3.  Txfs sit<>stand with SBA w RW use  4.  Pt to amb with BLE WBAT and B Darco shoes ~15' with CGA with RW without LOB during change in direction or straight path  5.  Pt to tolerate OOB chair > 1 hr          Outcome: Ongoing, Progressing     Pt with good participation during today's PT session per initial stance and steps at bedside BLE WBAT with RW use with min assist with B DARCO shoes intact.  Rec cont active progression with acute therapy services and Rec Moderate Intensity Therapy upon discharge to next level of care as pt requires 24/7 assistance at this time with all functional mobility and ADL performance.

## 2024-02-13 NOTE — PROGRESS NOTES
[facility] Medicine  Progress Note    Patient Name: Tasha Hawley  MRN: [id number]  Patient Class: IP- Inpatient   Admission Date: 2/9/2024  Length of Stay: 3 days  Attending Physician: Matt Duarte MD  Primary Care Provider: Mesfin Hodges II, MD        Subjective:     Principal Problem:Frequent falls        HPI:  Tasha Hawley is a 68-year-old female with past medical history of chronic lymphedema, heart failure, depression who presents due to worsening lower extremity swelling, leaking from indwelling catheter, and repeated falls per .     is at bedside and provides much of the history.  He reports that over the past few weeks, his wife has been having multiple falls due to unsteady gait partly due to her worsening lower extremity edema.  He reports despite taking the oral Lasix, patient has lower extremity edema,which has worsened with blistering occurring.  Also reports some chest pain with shortness of breath.  He wishes for possible temporary placement due to difficulty taking care of her.    He also was concerned that she has not had replacement of her indwelling suprapubic catheter.  He reports consistent leaking and wishes to get that addressed.  Patient presents for further evaluation.    Triage vitals were significant for hypoxemia.  Review of systems significant for leg swelling.  Physical exam significant for lower extremity swelling and suprapubic catheter without significant erythema in surrounding skin.    CBC significant for normocytic anemia.  CMP significant for elevated ALP.  BNP and troponin negative.  UA with bacteria and WBCs.  Urine culture pending.    CT head without contrast is negative for any acute abnormality.    Chest x-ray concerning for hiatal hernia but no obvious cardiopulmonary infectious etiology noted.    Patient admitted for falls, UA, diuresis and lower extremity wound management.      Overview/Hospital Course:  No notes on  file    Interval History: No acute events  Pt sleeping comfortably.  When woken up, patient complains of uncontrolled pain requesting dilaudid.   No other complaints  Denies cp/sob, f/c/nv    Review of Systems   Constitutional:  Negative for chills, fatigue and fever.   Respiratory:  Negative for cough and shortness of breath.    Cardiovascular:  Negative for chest pain, palpitations and leg swelling.   Gastrointestinal:  Negative for abdominal pain, constipation, diarrhea and nausea.   Musculoskeletal:  Positive for arthralgias. Negative for back pain.   Neurological:  Negative for dizziness and headaches.   Psychiatric/Behavioral:  Negative for agitation and confusion.      Objective:     Vital Signs (Most Recent):  Temp: 98 °F (36.7 °C) (02/13/24 1142)  Pulse: (!) 58 (02/13/24 1142)  Resp: 20 (02/13/24 1142)  BP: (!) 100/51 (02/13/24 1142)  SpO2: 100 % (02/13/24 1142) Vital Signs (24h Range):  Temp:  [97.6 °F (36.4 °C)-98.4 °F (36.9 °C)] 98 °F (36.7 °C)  Pulse:  [58-87] 58  Resp:  [18-22] 20  SpO2:  [95 %-100 %] 100 %  BP: (100-139)/(51-75) 100/51     Weight: 105.8 kg (233 lb 4 oz)  Body mass index is 36.53 kg/m².    Intake/Output Summary (Last 24 hours) at 2/13/2024 1236  Last data filed at 2/13/2024 0800  Gross per 24 hour   Intake 316.59 ml   Output 350 ml   Net -33.41 ml         Physical Exam  Constitutional:       General: She is not in acute distress.     Appearance: She is ill-appearing. She is not toxic-appearing.   HENT:      Mouth/Throat:      Mouth: Mucous membranes are moist.      Pharynx: Oropharynx is clear.   Eyes:      Conjunctiva/sclera: Conjunctivae normal.   Cardiovascular:      Rate and Rhythm: Normal rate and regular rhythm.      Heart sounds: Normal heart sounds.   Pulmonary:      Effort: Pulmonary effort is normal.      Breath sounds: Normal breath sounds.   Abdominal:      Palpations: Abdomen is soft.   Musculoskeletal:      Right lower leg: Edema present.      Left lower leg: Edema  present.      Comments: BLE wound dressings in place   Skin:     General: Skin is warm and dry.   Neurological:      General: No focal deficit present.      Mental Status: She is alert and oriented to person, place, and time.   Psychiatric:         Mood and Affect: Mood normal.         Behavior: Behavior normal.             Significant Labs: All pertinent labs within the past 24 hours have been reviewed.    Significant Imaging: I have reviewed all pertinent imaging results/findings within the past 24 hours.    Assessment/Plan:      * Frequent falls  Frequent falls over the past few weeks  Has been unable to take care of the patient  Patient has a history of being reluctant to placement & typically requests discharge home in the past    Plan:  PT/OT consult - prior h/o metatarsal fractures. bilateral foot xray ordered to determine WB status - ?new non displaced fracture, ortho consult - no intervention indicated, WBAT, hard sole shoe  Case management consult to assist with placement    Chronic hypoxic respiratory failure  Patient with Hypercapnic and Hypoxic Respiratory failure which is Chronic.  she is on home oxygen at 2-3 LPM.     .       Adrenal insufficiency  Continue with the patient's home regimen    Consider increasing to stress dose while hospitalized. However pt is currently asymptomatic and HDS    Normocytic anemia  Patient's anemia is currently controlled. Has not received any PRBCs to date. Etiology likely d/t Iron deficiency  Current CBC reviewed-   Lab Results   Component Value Date    HGB 11.2 (L) 02/13/2024    HCT 36.6 (L) 02/13/2024     Monitor serial CBC and transfuse if patient becomes hemodynamically unstable, symptomatic or H/H drops below 7/21.    UTI (urinary tract infection)  Present on admission - CAUTI  UA with pyuria  Possible colonization due to suprapubic catheter but pt reports weakness which she usually has with UTIs    Continue ceftriaxone, abx X 7 days. Urine cx growing  [facility]  Urology consult for suprapubic catheter exchange. S/p catheter exchange on 2/12      Suprapubic catheter  Noted    Currently on Rocephin      Lymphedema of both lower extremities  Significant lymphedema in bilateral extremities  Blistering noted on both    IV diuresis (dose lowered due to hypotension)  Wound care      Hypothyroidism (acquired)  Unclear patient was taking both Synthroid and Cytomel  Continue home regimen    Severe obesity (BMI 35.0-39.9) with comorbidity  Body mass index is 36.53 kg/m². Morbid obesity complicates all aspects of disease management from diagnostic modalities to treatment. Weight loss encouraged and health benefits explained to patient.         Narcotic dependency, continuous  Pt has a SC dilaudid pump managed by pain clinic, Dr. Hillman    Pt requesting for IV dilaudid. Discussed with refrain from parenteral narcotics given the SC dilaudid pump.   Pt in agreement with low dose PRN oxycodone, tylenol for breakthrough pain    Will continue to avoid IV narcotics. However, Per pharmacy, pump change is due in 3 days which cannot be done in the hospital so once dilaudid pump is empty can get equivalent SC dilaudid.      Generalized anxiety disorder  Continue with patient's home medications        VTE Risk Mitigation (From admission, onward)           Ordered     enoxaparin injection 40 mg  Every 24 hours         02/11/24 0840     IP VTE HIGH RISK PATIENT  Once         02/09/24 1811     Place sequential compression device  Until discontinued         02/09/24 1811                    Discharge Planning   JACKIE:      Code Status: Full Code   Is the patient medically ready for discharge?:     Reason for patient still in hospital (select all that apply): PT / OT recommendations and Pending disposition  Discharge Plan A:  (TBD)                  Matt Duarte MD  Department of Hospital Medicine   [facility]

## 2024-02-13 NOTE — SUBJECTIVE & OBJECTIVE
Interval History: No acute events  Pt sleeping comfortably.  When woken up, patient complains of uncontrolled pain requesting dilaudid.   No other complaints  Denies cp/sob, f/c/nv    Review of Systems   Constitutional:  Negative for chills, fatigue and fever.   Respiratory:  Negative for cough and shortness of breath.    Cardiovascular:  Negative for chest pain, palpitations and leg swelling.   Gastrointestinal:  Negative for abdominal pain, constipation, diarrhea and nausea.   Musculoskeletal:  Positive for arthralgias. Negative for back pain.   Neurological:  Negative for dizziness and headaches.   Psychiatric/Behavioral:  Negative for agitation and confusion.      Objective:     Vital Signs (Most Recent):  Temp: 98 °F (36.7 °C) (02/13/24 1142)  Pulse: (!) 58 (02/13/24 1142)  Resp: 20 (02/13/24 1142)  BP: (!) 100/51 (02/13/24 1142)  SpO2: 100 % (02/13/24 1142) Vital Signs (24h Range):  Temp:  [97.6 °F (36.4 °C)-98.4 °F (36.9 °C)] 98 °F (36.7 °C)  Pulse:  [58-87] 58  Resp:  [18-22] 20  SpO2:  [95 %-100 %] 100 %  BP: (100-139)/(51-75) 100/51     Weight: 105.8 kg (233 lb 4 oz)  Body mass index is 36.53 kg/m².    Intake/Output Summary (Last 24 hours) at 2/13/2024 1236  Last data filed at 2/13/2024 0800  Gross per 24 hour   Intake 316.59 ml   Output 350 ml   Net -33.41 ml         Physical Exam  Constitutional:       General: She is not in acute distress.     Appearance: She is ill-appearing. She is not toxic-appearing.   HENT:      Mouth/Throat:      Mouth: Mucous membranes are moist.      Pharynx: Oropharynx is clear.   Eyes:      Conjunctiva/sclera: Conjunctivae normal.   Cardiovascular:      Rate and Rhythm: Normal rate and regular rhythm.      Heart sounds: Normal heart sounds.   Pulmonary:      Effort: Pulmonary effort is normal.      Breath sounds: Normal breath sounds.   Abdominal:      Palpations: Abdomen is soft.   Musculoskeletal:      Right lower leg: Edema present.      Left lower leg: Edema present.       Comments: BLE wound dressings in place   Skin:     General: Skin is warm and dry.   Neurological:      General: No focal deficit present.      Mental Status: She is alert and oriented to person, place, and time.   Psychiatric:         Mood and Affect: Mood normal.         Behavior: Behavior normal.             Significant Labs: All pertinent labs within the past 24 hours have been reviewed.    Significant Imaging: I have reviewed all pertinent imaging results/findings within the past 24 hours.

## 2024-02-13 NOTE — ASSESSMENT & PLAN NOTE
Patient's anemia is currently controlled. Has not received any PRBCs to date. Etiology likely d/t Iron deficiency  Current CBC reviewed-   Lab Results   Component Value Date    HGB 11.2 (L) 02/13/2024    HCT 36.6 (L) 02/13/2024     Monitor serial CBC and transfuse if patient becomes hemodynamically unstable, symptomatic or H/H drops below 7/21.

## 2024-02-13 NOTE — PT/OT/SLP PROGRESS
Physical Therapy Treatment    Patient Name:  Tasha Hawley   MRN:  [id number]    Recommendations:     Discharge Recommendations: Moderate Intensity Therapy  Discharge Equipment Recommendations: bath bench  Barriers to discharge: Decreased caregiver support;  pt requires sig assistance with all ADL and mobility tasks - hx of multiple falls in home setting on regular basis even prior to current admit    Assessment:     Tasha Hawley is a 68 y.o. female admitted with a medical diagnosis of Frequent falls.  She presents with the following impairments/functional limitations: weakness, impaired endurance, impaired self care skills, impaired functional mobility, gait instability, impaired balance, pain, impaired cardiopulmonary response to activity, decreased safety awareness, edema, impaired skin, decreased lower extremity function, decreased upper extremity function, decreased coordination, orthopedic precautions.  Pt with good participation during today's PT session per initial stance and steps at bedside BLE WBAT with RW use with min assist with B DARCO shoes intact. Rec cont active progression with acute therapy services and Rec Moderate Intensity Therapy upon discharge to next level of care as pt requires 24/7 assistance at this time with all functional mobility and ADL performance.     Rehab Prognosis: Good; patient would benefit from acute skilled PT services to address these deficits and reach maximum level of function.    Recent Surgery: * No surgery found *      Plan:     During this hospitalization, patient to be seen 5 x/week to address the identified rehab impairments via gait training, therapeutic activities, therapeutic exercises, neuromuscular re-education and progress toward the following goals:    Plan of Care Expires:  02/10/24    Subjective     Chief Complaint: c/o pain BLE increases with touch and with movement  Patient/Family Comments/goals: improve Ind levels  Pain/Comfort:  Pain Rating 1:   (no numeric value provided today)  Location - Side 1: Bilateral  Location - Orientation 1: generalized  Location 1: foot  Pain Addressed 1: Reposition, Distraction, Cessation of Activity, Nurse notified  Pain Rating Post-Intervention 1: other (see comments) (no numeric value reported - no increase in pain after gait reported)      Objective:     Communicated with nsg prior to session.  Patient found supine with bed alarm, telemetry, oxygen, Other (comments) (supra pubic catheter) upon PT entry to room.     General Precautions: Standard, fall  Orthopedic Precautions: RLE weight bearing as tolerated, LLE weight bearing as tolerated  Braces:  (BLE  hard bottom shoes;  Darco shoes BLE)  Resp Status:  NC supplemental oxygen     Functional Mobility:  - seated scoot with min assist and VC/MC tech  - txfs sup>sit with SBA until last 10% of transition and then assisted CGA/min assist to achieve full even upright sit in 90/90 position;  sit>sup with min assist  - txfs sit<>stand with min assist and VC technqiue hand placement after donning Dependently her B DARCO shoes  -pre gait and gait - pt amb with RW min assist with RW guidance 5 steps fwd and back; side step x 3 steps B directions x 4 reps total;  pt demo increased mobility using stance/gait this day - flexed posture, decreased step length, decreased foot clearance but step through pattern demo'd      AM-PAC 6 CLICK MOBILITY  Turning over in bed (including adjusting bedclothes, sheets and blankets)?: 3  Sitting down on and standing up from a chair with arms (e.g., wheelchair, bedside commode, etc.): 3  Moving from lying on back to sitting on the side of the bed?: 3  Moving to and from a bed to a chair (including a wheelchair)?: 2  Need to walk in hospital room?: 2  Climbing 3-5 steps with a railing?: 1  Basic Mobility Total Score: 14       Treatment & Education:  Ed for PT POC, safety w mobility, DARCO shoe use; RW fit and use w safe tech;  pt perf seated EOB  balance/endurance act, perf BUE 2 x 10 reps therex and BLE 3 x 10 reps therex;  bed mobility training, txfs training, posture marky, pre gait and gait training.      Patient left supine with all lines intact, call button in reach, bed alarm on, and nsg notified..    GOALS:   Multidisciplinary Problems       Physical Therapy Goals          Problem: Physical Therapy    Goal Priority Disciplines Outcome Goal Variances Interventions   Physical Therapy Goal     PT, PT/OT Ongoing, Progressing     Description: Goals to be met by: 3/10/2024    Patient will increase functional independence with mobility by performin. Bed mobility with Mod independence  2. Supine<>sit with Mod assist.    Revised Goals to be met by discharge date:  1.  Bed mobility with Mod Ind  2.  Txfs sup<>sit with SBA  3.  Txfs sit<>stand with SBA w RW use  4.  Pt to amb with BLE WBAT and B Darco shoes ~15' with CGA with RW without LOB during change in direction or straight path  5.  Pt to tolerate OOB chair > 1 hr                               Time Tracking:     PT Received On: 24  PT Start Time: 1042     PT Stop Time: 1115  PT Total Time (min): 33 min     Billable Minutes: Gait Training 15 and Therapeutic Exercise 18    Treatment Type: Treatment  PT/PTA: PT     Number of PTA visits since last PT visit: 0     2024

## 2024-02-14 LAB
OHS QRS DURATION: 106 MS
OHS QTC CALCULATION: 437 MS

## 2024-02-14 PROCEDURE — 27000221 HC OXYGEN, UP TO 24 HOURS: Mod: HCNC

## 2024-02-14 PROCEDURE — 63600175 PHARM REV CODE 636 W HCPCS: Mod: HCNC | Performed by: STUDENT IN AN ORGANIZED HEALTH CARE EDUCATION/TRAINING PROGRAM

## 2024-02-14 PROCEDURE — 25000003 PHARM REV CODE 250: Mod: HCNC | Performed by: STUDENT IN AN ORGANIZED HEALTH CARE EDUCATION/TRAINING PROGRAM

## 2024-02-14 PROCEDURE — 97530 THERAPEUTIC ACTIVITIES: CPT | Mod: HCNC,CQ

## 2024-02-14 PROCEDURE — 97110 THERAPEUTIC EXERCISES: CPT | Mod: HCNC,CO

## 2024-02-14 PROCEDURE — 99900035 HC TECH TIME PER 15 MIN (STAT): Mod: HCNC

## 2024-02-14 PROCEDURE — 25000003 PHARM REV CODE 250: Mod: HCNC | Performed by: HOSPITALIST

## 2024-02-14 PROCEDURE — 94640 AIRWAY INHALATION TREATMENT: CPT | Mod: HCNC

## 2024-02-14 PROCEDURE — 97110 THERAPEUTIC EXERCISES: CPT | Mod: HCNC,CQ

## 2024-02-14 PROCEDURE — 97535 SELF CARE MNGMENT TRAINING: CPT | Mod: HCNC,CO

## 2024-02-14 PROCEDURE — 11000001 HC ACUTE MED/SURG PRIVATE ROOM: Mod: HCNC

## 2024-02-14 PROCEDURE — 94761 N-INVAS EAR/PLS OXIMETRY MLT: CPT | Mod: HCNC

## 2024-02-14 RX ADMIN — DULOXETINE 60 MG: 30 CAPSULE, DELAYED RELEASE ORAL at 09:02

## 2024-02-14 RX ADMIN — LEVOTHYROXINE SODIUM 150 MCG: 25 TABLET ORAL at 06:02

## 2024-02-14 RX ADMIN — ENOXAPARIN SODIUM 40 MG: 40 INJECTION SUBCUTANEOUS at 04:02

## 2024-02-14 RX ADMIN — MUPIROCIN: 20 OINTMENT TOPICAL at 09:02

## 2024-02-14 RX ADMIN — SENNOSIDES 2 TABLET: 8.6 TABLET, FILM COATED ORAL at 09:02

## 2024-02-14 RX ADMIN — ASPIRIN 81 MG: 81 TABLET, COATED ORAL at 09:02

## 2024-02-14 RX ADMIN — TRAZODONE HYDROCHLORIDE 300 MG: 100 TABLET ORAL at 09:02

## 2024-02-14 RX ADMIN — ACETAMINOPHEN 650 MG: 325 TABLET ORAL at 02:02

## 2024-02-14 RX ADMIN — FLUDROCORTISONE ACETATE 100 MCG: 0.1 TABLET ORAL at 09:02

## 2024-02-14 RX ADMIN — HYDROCORTISONE 10 MG: 5 TABLET ORAL at 09:02

## 2024-02-14 RX ADMIN — FUROSEMIDE 20 MG: 10 INJECTION, SOLUTION INTRAMUSCULAR; INTRAVENOUS at 09:02

## 2024-02-14 RX ADMIN — FLUTICASONE PROPIONATE 100 MCG: 50 SPRAY, METERED NASAL at 09:02

## 2024-02-14 RX ADMIN — ATORVASTATIN CALCIUM 80 MG: 40 TABLET, FILM COATED ORAL at 09:02

## 2024-02-14 RX ADMIN — QUETIAPINE FUMARATE 200 MG: 100 TABLET ORAL at 09:02

## 2024-02-14 RX ADMIN — HYDROCODONE BITARTRATE AND ACETAMINOPHEN 1 TABLET: 10; 325 TABLET ORAL at 04:02

## 2024-02-14 RX ADMIN — TIOTROPIUM BROMIDE INHALATION SPRAY 2 PUFF: 3.12 SPRAY, METERED RESPIRATORY (INHALATION) at 07:02

## 2024-02-14 RX ADMIN — Medication 2 TABLET: at 09:02

## 2024-02-14 RX ADMIN — HYDROCODONE BITARTRATE AND ACETAMINOPHEN 1 TABLET: 10; 325 TABLET ORAL at 06:02

## 2024-02-14 RX ADMIN — OXYBUTYNIN CHLORIDE 5 MG: 5 TABLET, EXTENDED RELEASE ORAL at 09:02

## 2024-02-14 NOTE — PROGRESS NOTES
[facility] Medicine  Telemedicine Progress Note    Patient Name: Tasha Hawley  MRN: [id number]  Patient Class: IP- Inpatient   Admission Date: 2/9/2024  Length of Stay: 4 days  Attending Physician: Zaida Skelton MD  Primary Care Provider: Mesfin Hodges II, MD          Subjective:     Principal Problem:Frequent falls        HPI:  Tasha Hawley is a 68-year-old female with past medical history of chronic lymphedema, heart failure, depression who presents due to worsening lower extremity swelling, leaking from indwelling catheter, and repeated falls per .     is at bedside and provides much of the history.  He reports that over the past few weeks, his wife has been having multiple falls due to unsteady gait partly due to her worsening lower extremity edema.  He reports despite taking the oral Lasix, patient has lower extremity edema,which has worsened with blistering occurring.  Also reports some chest pain with shortness of breath.  He wishes for possible temporary placement due to difficulty taking care of her.    He also was concerned that she has not had replacement of her indwelling suprapubic catheter.  He reports consistent leaking and wishes to get that addressed.  Patient presents for further evaluation.    Triage vitals were significant for hypoxemia.  Review of systems significant for leg swelling.  Physical exam significant for lower extremity swelling and suprapubic catheter without significant erythema in surrounding skin.    CBC significant for normocytic anemia.  CMP significant for elevated ALP.  BNP and troponin negative.  UA with bacteria and WBCs.  Urine culture pending.    CT head without contrast is negative for any acute abnormality.    Chest x-ray concerning for hiatal hernia but no obvious cardiopulmonary infectious etiology noted.    Patient admitted for falls, UA, diuresis and lower extremity wound management.      Overview/Hospital Course:  No  notes on file    Interval History: awaiting on placement    Review of Systems   Constitutional:  Negative for chills, fatigue and fever.   Respiratory:  Negative for cough and shortness of breath.    Cardiovascular:  Negative for chest pain, palpitations and leg swelling.   Gastrointestinal:  Negative for abdominal pain, constipation, diarrhea and nausea.   Musculoskeletal:  Positive for arthralgias. Negative for back pain.   Neurological:  Negative for dizziness and headaches.   Psychiatric/Behavioral:  Negative for agitation and confusion.      Objective:     Vital Signs (Most Recent):  Temp: 97.9 °F (36.6 °C) (02/14/24 1122)  Pulse: 65 (02/14/24 1215)  Resp: 20 (02/14/24 1122)  BP: (!) 92/55 (02/14/24 1214)  SpO2: 99 % (02/14/24 1122) Vital Signs (24h Range):  Temp:  [97.2 °F (36.2 °C)-99 °F (37.2 °C)] 97.9 °F (36.6 °C)  Pulse:  [61-89] 65  Resp:  [16-22] 20  SpO2:  [93 %-99 %] 99 %  BP: ()/(51-75) 92/55     Weight: 105.8 kg (233 lb 4 oz)  Body mass index is 36.53 kg/m².    Intake/Output Summary (Last 24 hours) at 2/14/2024 1427  Last data filed at 2/14/2024 1423  Gross per 24 hour   Intake 939.53 ml   Output 2300 ml   Net -1360.47 ml           Physical Exam  Constitutional:       General: She is not in acute distress.     Appearance: She is ill-appearing. She is not toxic-appearing.   HENT:      Mouth/Throat:      Mouth: Mucous membranes are moist.      Pharynx: Oropharynx is clear.   Eyes:      Conjunctiva/sclera: Conjunctivae normal.   Cardiovascular:      Rate and Rhythm: Normal rate and regular rhythm.      Heart sounds: Normal heart sounds.   Pulmonary:      Effort: Pulmonary effort is normal.      Breath sounds: Normal breath sounds.   Abdominal:      Palpations: Abdomen is soft.   Musculoskeletal:      Right lower leg: Edema present.      Left lower leg: Edema present.      Comments: BLE wound dressings in place   Skin:     General: Skin is warm and dry.   Neurological:      General: No focal  deficit present.      Mental Status: She is alert and oriented to person, place, and time.   Psychiatric:         Mood and Affect: Mood normal.         Behavior: Behavior normal.             Significant Labs: All pertinent labs within the past 24 hours have been reviewed.    Significant Imaging: I have reviewed all pertinent imaging results/findings within the past 24 hours.    Assessment/Plan:      * Frequent falls  Frequent falls over the past few weeks  Has been unable to take care of the patient  Patient has a history of being reluctant to placement & typically requests discharge home in the past    Plan:  PT/OT consult - prior h/o metatarsal fractures. bilateral foot xray ordered to determine WB status - ?new non displaced fracture, ortho consult - no intervention indicated, WBAT, hard sole shoe  Case management consult to assist with placement    Chronic hypoxic respiratory failure  Patient with Hypercapnic and Hypoxic Respiratory failure which is Chronic.  she is on home oxygen at 2-3 LPM.     .       Adrenal insufficiency  Continue with the patient's home regimen    Consider increasing to stress dose while hospitalized. However pt is currently asymptomatic and HDS    Normocytic anemia  Patient's anemia is currently controlled. Has not received any PRBCs to date. Etiology likely d/t Iron deficiency  Current CBC reviewed-   Lab Results   Component Value Date    HGB 11.2 (L) 02/13/2024    HCT 36.6 (L) 02/13/2024     Monitor serial CBC and transfuse if patient becomes hemodynamically unstable, symptomatic or H/H drops below 7/21.    UTI (urinary tract infection)  Present on admission - CAUTI  UA with pyuria  Possible colonization due to suprapubic catheter but pt reports weakness which she usually has with UTIs    Continue ceftriaxone, abx X 7 days. Urine cx growing Ecoli  Urology consult for suprapubic catheter exchange. S/p catheter exchange on 2/12      Suprapubic catheter  Noted    Currently on  Rocephin      Lymphedema of both lower extremities  Significant lymphedema in bilateral extremities  Blistering noted on both    IV diuresis (dose lowered due to hypotension)  Wound care      Hypothyroidism (acquired)  Unclear patient was taking both Synthroid and Cytomel  Continue home regimen    Severe obesity (BMI 35.0-39.9) with comorbidity  Body mass index is 36.53 kg/m². Morbid obesity complicates all aspects of disease management from diagnostic modalities to treatment. Weight loss encouraged and health benefits explained to patient.         Narcotic dependency, continuous  Pt has a SC dilaudid pump managed by pain clinic, Dr. Hillman    Pt requesting for IV dilaudid. Discussed with refrain from parenteral narcotics given the SC dilaudid pump.   Pt in agreement with low dose PRN oxycodone, tylenol for breakthrough pain    Will continue to avoid IV narcotics. However, Per pharmacy, pump change is due in 3 days which cannot be done in the hospital so once dilaudid pump is empty can get equivalent SC dilaudid.      Generalized anxiety disorder  Continue with patient's home medications        VTE Risk Mitigation (From admission, onward)           Ordered     enoxaparin injection 40 mg  Every 24 hours         02/11/24 0840     IP VTE HIGH RISK PATIENT  Once         02/09/24 1811     Place sequential compression device  Until discontinued         02/09/24 1811                          I have completed this tele-visit without the assistance of a telepresenter.    The attending portion of this evaluation, treatment, and documentation was performed per Edy Theodore NP via Telemedicine AudioVisual using the secure [employer] software platform with 2 way audio/video. The provider was located off-site and the patient is located in the hospital. The aforementioned video software was utilized to document the relevant history and physical exam    Edy Theodore NP  Department of Hospital Medicine    [facility]

## 2024-02-14 NOTE — CONSULTS
Thank you for your consult to [facility]. We have reviewed the patient chart. This patient does meet criteria for [facility] service at this time.  Will assume care on 02/14/24 at 7AM.    Zaida Skelton MD

## 2024-02-14 NOTE — PT/OT/SLP PROGRESS
Physical Therapy Treatment    Patient Name:  Tasha Hawley   MRN:  [id number]    Recommendations:     Discharge Recommendations: Moderate Intensity Therapy  Discharge Equipment Recommendations:  (TBD next level of care)  Barriers to discharge:  decreased mobility,strength and endurance    Assessment:     Tasha Hawley is a 68 y.o. female admitted with a medical diagnosis of Frequent falls.  She presents with the following impairments/functional limitations: weakness, impaired endurance, impaired functional mobility, gait instability, impaired balance, decreased lower extremity function, pain, decreased ROM, impaired coordination, impaired skin, orthopedic precautions,pt requires assistance with all mobility at this time and will benefit from moderate intensity therapy upon discharge.    Rehab Prognosis: Fair; patient would benefit from acute skilled PT services to address these deficits and reach maximum level of function.    Recent Surgery: * No surgery found *      Plan:     During this hospitalization, patient to be seen 5 x/week to address the identified rehab impairments via gait training, therapeutic activities, therapeutic exercises, neuromuscular re-education and progress toward the following goals:    Plan of Care Expires:  02/10/24    Subjective     Chief Complaint: n/a  Patient/Family Comments/goals: pt agreeable to up in chair.  Pain/Comfort:  Pain Rating 1:  (no rating)  Location - Side 1: Bilateral  Location - Orientation 1: generalized  Location 1:  (feet and legs with WB)  Pain Addressed 1: Reposition, Distraction, Cessation of Activity      Objective:     Communicated with nsg prior to session.  Patient found supine with   upon PT entry to room.     General Precautions: Standard, fall  Orthopedic Precautions: RLE weight bearing as tolerated, LLE weight bearing as tolerated (with B Darco's)  Braces:  (B Darco's)  Respiratory Status:  supplemental O2     Functional Mobility:  Bed Mobility:      Supine to Sit: stand by assistance  Transfers:     Sit to Stand:  minimum assistance with rolling walker  Bed to Chair: minimum assistance and moderate assistance with  rolling walker  using  Step Transfer  Balance: fair sitting balance      AM-PAC 6 CLICK MOBILITY  Turning over in bed (including adjusting bedclothes, sheets and blankets)?: 3  Sitting down on and standing up from a chair with arms (e.g., wheelchair, bedside commode, etc.): 3  Moving from lying on back to sitting on the side of the bed?: 3  Moving to and from a bed to a chair (including a wheelchair)?: 2  Need to walk in hospital room?: 2  Climbing 3-5 steps with a railing?: 1  Basic Mobility Total Score: 14       Treatment & Education: le seated ex's X 10 reps inc: ap,laq's and hip flex,pt's needs and requests met      Patient left up in chair with all lines intact, call button in reach, and nsg and pct notified..    GOALS: see general POC  Multidisciplinary Problems       Physical Therapy Goals          Problem: Physical Therapy    Goal Priority Disciplines Outcome Goal Variances Interventions   Physical Therapy Goal     PT, PT/OT Ongoing, Progressing     Description: Goals to be met by: 3/10/2024    Patient will increase functional independence with mobility by performin. Bed mobility with Mod independence  2. Supine<>sit with Mod assist.    Revised Goals to be met by discharge date:  1.  Bed mobility with Mod Ind  2.  Txfs sup<>sit with SBA  3.  Txfs sit<>stand with SBA w RW use  4.  Pt to amb with BLE WBAT and B Darco shoes ~15' with CGA with RW without LOB during change in direction or straight path  5.  Pt to tolerate OOB chair > 1 hr                               Time Tracking:     PT Received On: 24  PT Start Time: 824     PT Stop Time: 850  PT Total Time (min): 26 min     Billable Minutes: Therapeutic Activity 15 and Therapeutic Exercise 11    Treatment Type: Treatment  PT/PTA: PTA     Number of PTA visits since last PT  visit: 1     02/14/2024

## 2024-02-14 NOTE — PLAN OF CARE
Problem: Physical Therapy  Goal: Physical Therapy Goal  Description: Goals to be met by: 3/10/2024    Patient will increase functional independence with mobility by performin. Bed mobility with Mod independence  2. Supine<>sit with Mod assist.    Revised Goals to be met by discharge date:  1.  Bed mobility with Mod Ind  2.  Txfs sup<>sit with SBA  3.  Txfs sit<>stand with SBA w RW use  4.  Pt to amb with BLE WBAT and B Darco shoes ~15' with CGA with RW without LOB during change in direction or straight path  5.  Pt to tolerate OOB chair > 1 hr          Outcome: Ongoing, Progressing

## 2024-02-14 NOTE — PLAN OF CARE
GERSON called in LOC[facility] and faxed PASSR.     Pending 142    Future Appointments   Date Time Provider Department Center   3/13/2024  1:00 PM Cat Cortés MD [facility]        02/14/24 0720   Post-Acute Status   Post-Acute Authorization Other   Discharge Plan   Discharge Plan A Skilled Nursing Facility

## 2024-02-14 NOTE — PT/OT/SLP PROGRESS
"Occupational Therapy   Treatment    Name: Tasha Hawley  MRN: [id number]  Admitting Diagnosis:  Frequent falls       Recommendations:     Discharge Recommendations: Moderate Intensity Therapy  Discharge Equipment Recommendations:  to be determined by next level of care  Barriers to discharge:  Decreased caregiver support (significant fall risk)    Assessment:     Tasha Hawley is a 68 y.o. female with a medical diagnosis of Frequent falls.   Performance deficits affecting function are weakness, impaired endurance, impaired self care skills, impaired functional mobility, gait instability, impaired balance, impaired cognition, decreased coordination, decreased upper extremity function, decreased lower extremity function, decreased safety awareness, pain, decreased ROM, impaired coordination, impaired cardiopulmonary response to activity.     Rehab Prognosis:  Fair; patient would benefit from acute skilled OT services to address these deficits and reach maximum level of function.       Plan:     Patient to be seen 3 x/week to address the above listed problems via self-care/home management, therapeutic activities, therapeutic exercises  Plan of Care Expires: 03/11/24  Plan of Care Reviewed with: patient    Subjective     Chief Complaint: "I didn't do anything bad"  Patient/Family Comments/goals: return to PLOF  Pain/Comfort:  Pain Rating 1: 0/10    Objective:     Communicated with: Matt fernandez prior to session.  Patient found up in chair with bed alarm, telemetry, oxygen (suprapubic catheter) upon OT entry to room.    General Precautions: Standard, fall    Orthopedic Precautions:RLE weight bearing as tolerated, LLE weight bearing as tolerated  Braces:  (B darco shoes)    Activities of Daily Living:  Grooming: set-up to complete multiple G/H tasks from chair level    Toileting: suprapubic catheter      [facility] 6 Click ADL: 16    Treatment & Education:  On arrival, patient appearing very lethargic  Able to answer " "questions but eyes closing drowsily every few seconds  Demo'd multiple instances of visual hallucinations during sessions  "What size is she?" Speaking of a woman not present during arrival to room  "It's your turn to jump" "Well the girl behind you was jumping" again, no other persons present in room; nsg notified of hallucinations  ADL tasks as above  G/H from chair level  Patient instructed in and completed BUE AROM therex, 1 x 15 reps: bicep curls, sh flexion, horizontal sh abd/add, punches, hand pumps.     Patient left up in chair with all lines intact, call button in reach, chair alarm on, and nsg notified    GOALS:   Multidisciplinary Problems       Occupational Therapy Goals          Problem: Occupational Therapy    Goal Priority Disciplines Outcome Interventions   Occupational Therapy Goal     OT, PT/OT Ongoing, Progressing    Description: Goals to be met by: 03/11/24     Patient will increase functional independence with ADLs by performing:    UE Dressing with Modified [place].  Grooming while seated at sink with Modified [place].  Upper extremity exercise program 3x15 reps per handout, with assistance as needed.                         Time Tracking:     OT Date of Treatment: 02/14/24  OT Start Time: 1055  OT Stop Time: 1118  OT Total Time (min): 23 min    Billable Minutes:Self Care/Home Management 13  Therapeutic Exercise 10    OT/SAMANTHA: SAMANTHA     Number of SAMANTHA visits since last OT visit: 1 2/14/2024  "

## 2024-02-14 NOTE — PLAN OF CARE
The sw met with the pt to discuss her d/c plan and her options moving forward. The pt states she will not go to any snf's inside of a nh. The sw notified her the options for hospital based are [facility] and [facility]. The pt has been made aware of her being denied at the snf's via [employer]. The pt stated she doesn't want to go to a snf inside of a nh. The sw notified her that's the snf she qualifies for because the hospital based ones are for short stay pt's. The pt acknowledged understanding and again states she doesn't want to go to a nh snf. The pt informed the sw she left [facility] b/c it was inside of a nh and she will leave again if she goes to another snf inside of a nh. The sw asked permission to speak with the pt's spouse but she declined to allow it. The pt gave the sw permission to speak with her dtr Lisa Thompson [phone] in reference to her d/c plan. The sw spoke to Lisa via phone and she states the pt is very non-compliant and does what she wants to do. She states the pt comes into the hospital and gets fixed up and then she's back to square one due to her non-compliance. The sw informed her of the info mentioned above and she acknowledged understanding. She states the pt has a pain pump and a case with [employer] that is not opened but she gets money every month from it. The sw informed her that may be the issue but can't definitely say. She acknowledged understanding Case Management is working diligently to secure snf placement for this pt but understands it's difficult. The sw sent a referral for this pt to [facility] via [facility].   Lisa states if the pt isn't accepted at [facility],it's safe to say the pt will have to return home with hh b/c she will not go to a snf inside of a nh. The sw left her name and contact info with the pt and Lisa.  The sw encouraged them to call if they have any further questions or concerns. The sw received a call from Evens at [facility]  [phone] who states the pt has been denied by [facility] b/c her insurance is out of network. She states she will review the pt for [facility] and [facility].     1:17PM The sw received a call from Faith from [facility] [phone] stating they will not be able to admit this pt b/c she was at [facility] last month and she left AMA. The pt is still within her Wellness Days and they can't admit her.        02/14/24 1111   Post-Acute Status   Post-Acute Authorization Placement   Post-Acute Placement Status Referrals Sent   Discharge Delays (!) Post-Acute Set-up  (pt has been denied at alot of snf's in [facility])   Discharge Plan   Discharge Plan A Skilled Nursing Facility   Discharge Plan B Home Health

## 2024-02-14 NOTE — PLAN OF CARE
Patient A&Ox4. On Dilaudid subq pump. PRN pain meds given. Suprapubic catheter intact and draining well. Bilateral leg dressing clean, dry and intact. Dr Duarte informed about pt's behavior on asking too much juice and water, MD read message no new order, pt remains on regular diet. MD changed oxy to norco per pt's request. Call light within reach. Bed alarm on.

## 2024-02-14 NOTE — SUBJECTIVE & OBJECTIVE
Interval History: awaiting on placement    Review of Systems   Constitutional:  Negative for chills, fatigue and fever.   Respiratory:  Negative for cough and shortness of breath.    Cardiovascular:  Negative for chest pain, palpitations and leg swelling.   Gastrointestinal:  Negative for abdominal pain, constipation, diarrhea and nausea.   Musculoskeletal:  Positive for arthralgias. Negative for back pain.   Neurological:  Negative for dizziness and headaches.   Psychiatric/Behavioral:  Negative for agitation and confusion.      Objective:     Vital Signs (Most Recent):  Temp: 97.9 °F (36.6 °C) (02/14/24 1122)  Pulse: 65 (02/14/24 1215)  Resp: 20 (02/14/24 1122)  BP: (!) 92/55 (02/14/24 1214)  SpO2: 99 % (02/14/24 1122) Vital Signs (24h Range):  Temp:  [97.2 °F (36.2 °C)-99 °F (37.2 °C)] 97.9 °F (36.6 °C)  Pulse:  [61-89] 65  Resp:  [16-22] 20  SpO2:  [93 %-99 %] 99 %  BP: ()/(51-75) 92/55     Weight: 105.8 kg (233 lb 4 oz)  Body mass index is 36.53 kg/m².    Intake/Output Summary (Last 24 hours) at 2/14/2024 1427  Last data filed at 2/14/2024 1423  Gross per 24 hour   Intake 939.53 ml   Output 2300 ml   Net -1360.47 ml           Physical Exam  Constitutional:       General: She is not in acute distress.     Appearance: She is ill-appearing. She is not toxic-appearing.   HENT:      Mouth/Throat:      Mouth: Mucous membranes are moist.      Pharynx: Oropharynx is clear.   Eyes:      Conjunctiva/sclera: Conjunctivae normal.   Cardiovascular:      Rate and Rhythm: Normal rate and regular rhythm.      Heart sounds: Normal heart sounds.   Pulmonary:      Effort: Pulmonary effort is normal.      Breath sounds: Normal breath sounds.   Abdominal:      Palpations: Abdomen is soft.   Musculoskeletal:      Right lower leg: Edema present.      Left lower leg: Edema present.      Comments: BLE wound dressings in place   Skin:     General: Skin is warm and dry.   Neurological:      General: No focal deficit present.       Mental Status: She is alert and oriented to person, place, and time.   Psychiatric:         Mood and Affect: Mood normal.         Behavior: Behavior normal.             Significant Labs: All pertinent labs within the past 24 hours have been reviewed.    Significant Imaging: I have reviewed all pertinent imaging results/findings within the past 24 hours.

## 2024-02-14 NOTE — PLAN OF CARE
Patient alert and awake. Bilateral leg dressing clean, dry and intact. Up in the chair during lunch with walker and 2 people assists. Call light within reach. Bed alarm on.

## 2024-02-15 LAB
BASOPHILS # BLD AUTO: 0.02 K/UL (ref 0–0.2)
BASOPHILS NFR BLD: 0.5 % (ref 0–1.9)
DIFFERENTIAL METHOD BLD: ABNORMAL
EOSINOPHIL # BLD AUTO: 0.3 K/UL (ref 0–0.5)
EOSINOPHIL NFR BLD: 7.4 % (ref 0–8)
ERYTHROCYTE [DISTWIDTH] IN BLOOD BY AUTOMATED COUNT: 15.6 % (ref 11.5–14.5)
HCT VFR BLD AUTO: 35 % (ref 37–48.5)
HGB BLD-MCNC: 10.5 G/DL (ref 12–16)
IMM GRANULOCYTES # BLD AUTO: 0.01 K/UL (ref 0–0.04)
IMM GRANULOCYTES NFR BLD AUTO: 0.2 % (ref 0–0.5)
LYMPHOCYTES # BLD AUTO: 1 K/UL (ref 1–4.8)
LYMPHOCYTES NFR BLD: 24.8 % (ref 18–48)
MCH RBC QN AUTO: 27.4 PG (ref 27–31)
MCHC RBC AUTO-ENTMCNC: 30 G/DL (ref 32–36)
MCV RBC AUTO: 91 FL (ref 82–98)
MONOCYTES # BLD AUTO: 0.3 K/UL (ref 0.3–1)
MONOCYTES NFR BLD: 7.7 % (ref 4–15)
NEUTROPHILS # BLD AUTO: 2.4 K/UL (ref 1.8–7.7)
NEUTROPHILS NFR BLD: 59.4 % (ref 38–73)
NRBC BLD-RTO: 0 /100 WBC
PLATELET # BLD AUTO: 193 K/UL (ref 150–450)
PMV BLD AUTO: 9.7 FL (ref 9.2–12.9)
RBC # BLD AUTO: 3.83 M/UL (ref 4–5.4)
WBC # BLD AUTO: 4.04 K/UL (ref 3.9–12.7)

## 2024-02-15 PROCEDURE — 25000003 PHARM REV CODE 250: Mod: HCNC | Performed by: STUDENT IN AN ORGANIZED HEALTH CARE EDUCATION/TRAINING PROGRAM

## 2024-02-15 PROCEDURE — 63600175 PHARM REV CODE 636 W HCPCS: Mod: HCNC | Performed by: STUDENT IN AN ORGANIZED HEALTH CARE EDUCATION/TRAINING PROGRAM

## 2024-02-15 PROCEDURE — 97110 THERAPEUTIC EXERCISES: CPT | Mod: HCNC,CQ

## 2024-02-15 PROCEDURE — 25000003 PHARM REV CODE 250: Mod: HCNC | Performed by: HOSPITALIST

## 2024-02-15 PROCEDURE — 99900035 HC TECH TIME PER 15 MIN (STAT): Mod: HCNC

## 2024-02-15 PROCEDURE — 36415 COLL VENOUS BLD VENIPUNCTURE: CPT | Mod: HCNC | Performed by: STUDENT IN AN ORGANIZED HEALTH CARE EDUCATION/TRAINING PROGRAM

## 2024-02-15 PROCEDURE — 97116 GAIT TRAINING THERAPY: CPT | Mod: HCNC,CQ

## 2024-02-15 PROCEDURE — 27000221 HC OXYGEN, UP TO 24 HOURS: Mod: HCNC

## 2024-02-15 PROCEDURE — 11000001 HC ACUTE MED/SURG PRIVATE ROOM: Mod: HCNC

## 2024-02-15 PROCEDURE — 94640 AIRWAY INHALATION TREATMENT: CPT | Mod: HCNC

## 2024-02-15 PROCEDURE — 85025 COMPLETE CBC W/AUTO DIFF WBC: CPT | Mod: HCNC | Performed by: STUDENT IN AN ORGANIZED HEALTH CARE EDUCATION/TRAINING PROGRAM

## 2024-02-15 PROCEDURE — 94761 N-INVAS EAR/PLS OXIMETRY MLT: CPT | Mod: HCNC

## 2024-02-15 RX ADMIN — TRAZODONE HYDROCHLORIDE 300 MG: 100 TABLET ORAL at 08:02

## 2024-02-15 RX ADMIN — DULOXETINE 60 MG: 30 CAPSULE, DELAYED RELEASE ORAL at 10:02

## 2024-02-15 RX ADMIN — ASPIRIN 81 MG: 81 TABLET, COATED ORAL at 09:02

## 2024-02-15 RX ADMIN — SENNOSIDES 2 TABLET: 8.6 TABLET, FILM COATED ORAL at 08:02

## 2024-02-15 RX ADMIN — MUPIROCIN: 20 OINTMENT TOPICAL at 10:02

## 2024-02-15 RX ADMIN — SENNOSIDES 2 TABLET: 8.6 TABLET, FILM COATED ORAL at 09:02

## 2024-02-15 RX ADMIN — CEFTRIAXONE 1 G: 1 INJECTION, POWDER, FOR SOLUTION INTRAMUSCULAR; INTRAVENOUS at 12:02

## 2024-02-15 RX ADMIN — ATORVASTATIN CALCIUM 80 MG: 40 TABLET, FILM COATED ORAL at 10:02

## 2024-02-15 RX ADMIN — HYDROCODONE BITARTRATE AND ACETAMINOPHEN 1 TABLET: 10; 325 TABLET ORAL at 10:02

## 2024-02-15 RX ADMIN — MUPIROCIN: 20 OINTMENT TOPICAL at 09:02

## 2024-02-15 RX ADMIN — Medication 2 TABLET: at 08:02

## 2024-02-15 RX ADMIN — FLUTICASONE PROPIONATE 100 MCG: 50 SPRAY, METERED NASAL at 09:02

## 2024-02-15 RX ADMIN — Medication 2 TABLET: at 09:02

## 2024-02-15 RX ADMIN — OXYBUTYNIN CHLORIDE 5 MG: 5 TABLET, EXTENDED RELEASE ORAL at 10:02

## 2024-02-15 RX ADMIN — LEVOTHYROXINE SODIUM 150 MCG: 25 TABLET ORAL at 05:02

## 2024-02-15 RX ADMIN — FUROSEMIDE 20 MG: 10 INJECTION, SOLUTION INTRAMUSCULAR; INTRAVENOUS at 08:02

## 2024-02-15 RX ADMIN — QUETIAPINE FUMARATE 200 MG: 100 TABLET ORAL at 08:02

## 2024-02-15 RX ADMIN — CEFTRIAXONE 1 G: 1 INJECTION, POWDER, FOR SOLUTION INTRAMUSCULAR; INTRAVENOUS at 11:02

## 2024-02-15 RX ADMIN — HYDROCORTISONE 10 MG: 5 TABLET ORAL at 08:02

## 2024-02-15 RX ADMIN — ENOXAPARIN SODIUM 40 MG: 40 INJECTION SUBCUTANEOUS at 05:02

## 2024-02-15 RX ADMIN — DULOXETINE 60 MG: 30 CAPSULE, DELAYED RELEASE ORAL at 08:02

## 2024-02-15 RX ADMIN — TIOTROPIUM BROMIDE INHALATION SPRAY 2 PUFF: 3.12 SPRAY, METERED RESPIRATORY (INHALATION) at 07:02

## 2024-02-15 RX ADMIN — FLUDROCORTISONE ACETATE 100 MCG: 0.1 TABLET ORAL at 10:02

## 2024-02-15 RX ADMIN — FUROSEMIDE 20 MG: 10 INJECTION, SOLUTION INTRAMUSCULAR; INTRAVENOUS at 10:02

## 2024-02-15 NOTE — ASSESSMENT & PLAN NOTE
Patient's anemia is currently controlled. Has not received any PRBCs to date. Etiology likely d/t Iron deficiency  Current CBC reviewed-   Lab Results   Component Value Date    HGB 10.5 (L) 02/15/2024    HCT 35.0 (L) 02/15/2024     Monitor serial CBC and transfuse if patient becomes hemodynamically unstable, symptomatic or H/H drops below 7/21.

## 2024-02-15 NOTE — PROGRESS NOTES
[facility] Medicine  Telemedicine Progress Note    Patient Name: Tasha Hawley  MRN: [id number]  Patient Class: IP- Inpatient   Admission Date: 2/9/2024  Length of Stay: 5 days  Attending Physician: Rebecca Chery MD  Primary Care Provider: Mesfin Hodges II, MD          Subjective:     Principal Problem:Frequent falls        HPI:  Tasha Hawley is a 68-year-old female with past medical history of chronic lymphedema, heart failure, depression who presents due to worsening lower extremity swelling, leaking from indwelling catheter, and repeated falls per .     is at bedside and provides much of the history.  He reports that over the past few weeks, his wife has been having multiple falls due to unsteady gait partly due to her worsening lower extremity edema.  He reports despite taking the oral Lasix, patient has lower extremity edema,which has worsened with blistering occurring.  Also reports some chest pain with shortness of breath.  He wishes for possible temporary placement due to difficulty taking care of her.    He also was concerned that she has not had replacement of her indwelling suprapubic catheter.  He reports consistent leaking and wishes to get that addressed.  Patient presents for further evaluation.    Triage vitals were significant for hypoxemia.  Review of systems significant for leg swelling.  Physical exam significant for lower extremity swelling and suprapubic catheter without significant erythema in surrounding skin.    CBC significant for normocytic anemia.  CMP significant for elevated ALP.  BNP and troponin negative.  UA with bacteria and WBCs.  Urine culture pending.    CT head without contrast is negative for any acute abnormality.    Chest x-ray concerning for hiatal hernia but no obvious cardiopulmonary infectious etiology noted.    Patient admitted for falls, UA, diuresis and lower extremity wound management.      Overview/Hospital  Course:  No notes on file    Interval History: awaiting on placement    Review of Systems   Constitutional:  Negative for chills, fatigue and fever.   Respiratory:  Negative for cough and shortness of breath.    Cardiovascular:  Negative for chest pain, palpitations and leg swelling.   Gastrointestinal:  Negative for abdominal pain, constipation, diarrhea and nausea.   Musculoskeletal:  Positive for arthralgias. Negative for back pain.   Neurological:  Negative for dizziness and headaches.   Psychiatric/Behavioral:  Negative for agitation and confusion.      Objective:     Vital Signs (Most Recent):  Temp: 97.8 °F (36.6 °C) (02/15/24 0726)  Pulse: (!) 57 (02/15/24 0726)  Resp: 18 (02/15/24 1000)  BP: (!) 120/54 (02/15/24 0726)  SpO2: 95 % (02/15/24 0720) Vital Signs (24h Range):  Temp:  [97.3 °F (36.3 °C)-97.9 °F (36.6 °C)] 97.8 °F (36.6 °C)  Pulse:  [57-89] 57  Resp:  [18-20] 18  SpO2:  [95 %-99 %] 95 %  BP: ()/(50-57) 120/54     Weight: 108.7 kg (239 lb 10.2 oz)  Body mass index is 37.53 kg/m².    Intake/Output Summary (Last 24 hours) at 2/15/2024 1020  Last data filed at 2/15/2024 0608  Gross per 24 hour   Intake 698.28 ml   Output 2600 ml   Net -1901.72 ml           Physical Exam  Constitutional:       General: She is not in acute distress.     Appearance: She is ill-appearing. She is not toxic-appearing.   HENT:      Mouth/Throat:      Mouth: Mucous membranes are moist.      Pharynx: Oropharynx is clear.   Eyes:      Conjunctiva/sclera: Conjunctivae normal.   Cardiovascular:      Rate and Rhythm: Normal rate and regular rhythm.      Heart sounds: Normal heart sounds.   Pulmonary:      Effort: Pulmonary effort is normal.      Breath sounds: Normal breath sounds.   Abdominal:      Palpations: Abdomen is soft.   Musculoskeletal:      Right lower leg: Edema present.      Left lower leg: Edema present.      Comments: BLE wound dressings in place   Skin:     General: Skin is warm and dry.   Neurological:       General: No focal deficit present.      Mental Status: She is alert and oriented to person, place, and time.   Psychiatric:         Mood and Affect: Mood normal.         Behavior: Behavior normal.             Significant Labs: All pertinent labs within the past 24 hours have been reviewed.    Significant Imaging: I have reviewed all pertinent imaging results/findings within the past 24 hours.    Assessment/Plan:      * Frequent falls  Frequent falls over the past few weeks  Has been unable to take care of the patient  Patient has a history of being reluctant to placement & typically requests discharge home in the past    Plan:  PT/OT consult - prior h/o metatarsal fractures. bilateral foot xray ordered to determine WB status - ?new non displaced fracture, ortho consult - no intervention indicated, WBAT, hard sole shoe  Case management consult to assist with placement    Chronic hypoxic respiratory failure  Patient with Hypercapnic and Hypoxic Respiratory failure which is Chronic.  she is on home oxygen at 2-3 LPM.     .       Adrenal insufficiency  Continue with the patient's home regimen    Consider increasing to stress dose while hospitalized. However pt is currently asymptomatic and HDS    Normocytic anemia  Patient's anemia is currently controlled. Has not received any PRBCs to date. Etiology likely d/t Iron deficiency  Current CBC reviewed-   Lab Results   Component Value Date    HGB 10.5 (L) 02/15/2024    HCT 35.0 (L) 02/15/2024     Monitor serial CBC and transfuse if patient becomes hemodynamically unstable, symptomatic or H/H drops below 7/21.    UTI (urinary tract infection)  Present on admission - CAUTI  UA with pyuria  Possible colonization due to suprapubic catheter but pt reports weakness which she usually has with UTIs    Continue ceftriaxone, abx X 7 days. Urine cx growing Ecoli  Urology consult for suprapubic catheter exchange. S/p catheter exchange on 2/12      Suprapubic  catheter  Noted    Currently on Rocephin      Lymphedema of both lower extremities  Significant lymphedema in bilateral extremities  Blistering noted on both    IV diuresis (dose lowered due to hypotension)  Wound care      Hypothyroidism (acquired)  Unclear patient was taking both Synthroid and Cytomel  Continue home regimen    Severe obesity (BMI 35.0-39.9) with comorbidity  Body mass index is 37.53 kg/m². Morbid obesity complicates all aspects of disease management from diagnostic modalities to treatment. Weight loss encouraged and health benefits explained to patient.         Narcotic dependency, continuous  Pt has a SC dilaudid pump managed by pain clinic, Dr. Hillman    Pt requesting for IV dilaudid. Discussed with refrain from parenteral narcotics given the SC dilaudid pump.   Pt in agreement with low dose PRN oxycodone, tylenol for breakthrough pain    Will continue to avoid IV narcotics. However, Per pharmacy, pump change is due in 3 days which cannot be done in the hospital so once dilaudid pump is empty can get equivalent SC dilaudid.      Generalized anxiety disorder  Continue with patient's home medications        VTE Risk Mitigation (From admission, onward)           Ordered     enoxaparin injection 40 mg  Every 24 hours         02/11/24 0840     IP VTE HIGH RISK PATIENT  Once         02/09/24 1811     Place sequential compression device  Until discontinued         02/09/24 1811                          I have completed this tele-visit without the assistance of a telepresenter.    The attending portion of this evaluation, treatment, and documentation was performed per Edy Theodore NP via Telemedicine AudioVisual using the secure [employer] software platform with 2 way audio/video. The provider was located off-site and the patient is located in the hospital. The aforementioned video software was utilized to document the relevant history and physical exam    Edy Theodore  NP  Department of [facility] Medicine   [facility]

## 2024-02-15 NOTE — PLAN OF CARE
[facility] [employer] [place] [facility] Phone: [phone]    24 Hours Fever Free  * COVID-19: Positive patients under care,* COVID-19: Willing/Equipped to accept COVID-19 positive patients,* High-Risk Isolation Patients: Willing/Equipped to accept High-Risk Isolation Patients [address] [facility] [place] [address] 2/10/2024 9:34 (CT)  2/12/2024 10:00 (CT)  -  2/12/2024 8:30 (CT)  2/12/2024 8:30 (CT)  Response: No, unable to accept patient  Reason: Care Needs Exceed Current Capacity  Waiting for you     [facility] Phone: [phone]    covid test required  * COVID-19: Willing/Equipped to accept COVID-19 positive patients [address] [facility] [place] [address] 2/10/2024 9:34 (CT)  2/12/2024 10:00 (CT)  -  2/12/2024 8:30 (CT)  2/12/2024 8:30 (CT)  Response: No, unable to accept patient  Reason: Care Needs Exceed Current Capacity  Waiting for you     [facility] Phone: [phone]      * COVID-19: NOT able to accept COVID-19 positive patients [address] [place] [address] 2/12/2024 10:56 (CT)  2/12/2024 12:55 (CT)  -  2/12/2024 11:03 (CT)  2/12/2024 18:45 (CT)  Response: No, unable to accept patient  Reason: Out of Network  Comments: out of network with no out of network benefits.   Waiting for you     [facility] [place] Phone: [phone]    negative covid within 24 hours of discharge  * COVID-19: NOT able to accept COVID-19 positive patients [address] [place] [address] [place] [address] 2/12/2024 10:56 (CT)  2/12/2024 12:55 (CT)  -  2/12/2024 11:14 (CT)  2/12/2024 11:14 (CT)  Response: No, unable to accept patient  Reason: Care Needs Exceed Current Capacity  Waiting for you     [facility] Phone: [phone]    1 negative within 24 hrs of admit covid test  * COVID-19: Willing/Equipped to accept COVID-19 positive patients,* High-Risk Isolation Patients: Willing/Equipped to accept High-Risk Isolation Patients [address] [place] [address] 2/12/2024 10:56 (CT)  2/12/2024 12:55 (CT)  -  2/12/2024 11:26 (CT)  2/12/2024 11:26 (CT)  Response: No, unable to accept patient  Reason: Out of Network  Waiting for you     [facility] Phone: [phone]    negative covid test  * COVID-19: Willing/Equipped to accept COVID-19 positive patients,* High-Risk Isolation Patients: Willing/Equipped to accept High-Risk Isolation Patients [address] [facility] [place] [address] 2/12/2024 10:56 (CT)  2/12/2024 12:55 (CT)  -  2/12/2024 11:29 (CT)  2/12/2024 11:29 (CT)  Response: No, unable to accept patient  Reason: No Available Bed  Waiting for you     [facility] Phone: [phone]      * COVID-19: NOT able to accept COVID-19 positive patients [address] [place] [address] 2/10/2024 9:34 (CT)  2/12/2024 10:00 (CT)  -  2/12/2024 8:30 (CT)  2/12/2024 8:30 (CT)  Response: No, unable to accept patient  Reason: Out of Network  Waiting for you     [facility] Phone: [phone]      * COVID-19: NOT able to accept COVID-19 positive patients [address] [place] [address] 2/12/2024 10:56 (CT)  2/12/2024 12:55 (CT)  -  2/14/2024 13:20 (CT)  2/14/2024 13:20 (CT)  Response: No, unable to accept patient  Reason: No Available Bed  Waiting for you     [facility] Phone: [phone]    No signs or symptoms for 10 days. Please do not re-test.  * COVID-19: Willing/Equipped to accept COVID-19 positive patients [address] [facility] [place] [address] 2/12/2024 10:56 (CT)  2/12/2024 12:55 (CT)  -  2/12/2024 11:37 (CT)  2/12/2024 11:37 (CT)  Response: No, unable to accept patient  Reason: Care Needs Exceed Current Capacity  Waiting for you [facility] Phone: [phone]    1 negative test in last 72 hours  * COVID-19: NOT able to accept COVID-19 positive patients [address] [facility] [address] 2/10/2024  9:34 (CT)  2/12/2024 10:00 (CT)  -  2/12/2024 8:56 (CT)  2/12/2024 8:56 (CT)  Response: No, unable to accept patient  Reason: Other (see comments)  Comments: Patient left here 1/2023 within 24 hours of admit AMA .   Waiting for you     [facility] Phone: [phone]      * COVID-19: Willing/Equipped to accept COVID-19 positive patients,* High-Risk Isolation Patients: Willing/Equipped to accept High-Risk Isolation Patients [address] [place] [address] 2/10/2024 9:34 (CT)  2/12/2024 10:00 (CT)  -  2/12/2024 8:30 (CT)  2/12/2024 8:30 (CT)  Response: No, unable to accept patient  Reason: Care Needs Exceed Current Capacity  Waiting for you     [facility] Phone: [phone]    all patients must have an up-to-date Covid swab test result closest to d/c, we will take our patients who are positive back.  * COVID-19: Willing/Equipped to accept COVID-19 positive patients,* High-Risk Isolation Patients: Willing/Equipped to accept High-Risk Isolation Patients [address] [address] 2/12/2024 10:56 (CT)  2/12/2024 12:55 (CT)  -  2/12/2024 10:59 (CT)  2/12/2024 11:01 (CT)  Response: No, unable to accept patient  Reason: Patient Not Eligible  Waiting for you     [facility] Phone: [phone]    covid test within 72 hours  * COVID-19: NOT able to accept COVID-19 positive patients,* High-Risk Isolation Patients: Willing/Equipped to accept High-Risk Isolation Patients [address] [address] 2/10/2024 9:34 (CT)  2/12/2024 10:00 (CT)  -  2/12/2024 8:30 (CT)  2/12/2024 8:30 (CT)  Response: No, unable to accept patient  Reason: Out of Network  Waiting for you     [facility] Phone: [phone]    2 negative test prior to entry  * COVID-19: NOT able to accept COVID-19 positive patients [address] [place] [address] 2/10/2024 9:34 (CT)  2/12/2024 10:00 (CT)  -  2/12/2024 8:30 (CT)  2/12/2024 8:30 (CT)  Response: No,  unable to accept patient  Reason: Out of Network  Waiting for you     [facility] Phone: [phone]    negative covid test 48hours prior to admissions.  * COVID-19: NOT able to accept COVID-19 positive patients,* High-Risk Isolation Patients: Willing/Equipped to accept High-Risk Isolation Patients [address] [place] [address] B[place] Gus[place] [address] 2/12/2024 10:56 (CT)  2/12/2024 12:55 (CT)  -  2/12/2024 11:02 (CT)  2/12/2024 14:08 (CT)  Response: No, unable to accept patient  Reason: Out of Network  Waiting for you      02/15/24 1218   Post-Acute Status   Post-Acute Authorization Placement   Discharge Plan   Discharge Plan A Skilled Nursing Facility   Discharge Plan B Rehab;Home Health     The pt has been denied at various snf's stating they can't accommodate her needs or due to her Workman's Comp Claim.

## 2024-02-15 NOTE — SUBJECTIVE & OBJECTIVE
Interval History: awaiting on placement    Review of Systems   Constitutional:  Negative for chills, fatigue and fever.   Respiratory:  Negative for cough and shortness of breath.    Cardiovascular:  Negative for chest pain, palpitations and leg swelling.   Gastrointestinal:  Negative for abdominal pain, constipation, diarrhea and nausea.   Musculoskeletal:  Positive for arthralgias. Negative for back pain.   Neurological:  Negative for dizziness and headaches.   Psychiatric/Behavioral:  Negative for agitation and confusion.      Objective:     Vital Signs (Most Recent):  Temp: 97.8 °F (36.6 °C) (02/15/24 0726)  Pulse: (!) 57 (02/15/24 0726)  Resp: 18 (02/15/24 1000)  BP: (!) 120/54 (02/15/24 0726)  SpO2: 95 % (02/15/24 0720) Vital Signs (24h Range):  Temp:  [97.3 °F (36.3 °C)-97.9 °F (36.6 °C)] 97.8 °F (36.6 °C)  Pulse:  [57-89] 57  Resp:  [18-20] 18  SpO2:  [95 %-99 %] 95 %  BP: ()/(50-57) 120/54     Weight: 108.7 kg (239 lb 10.2 oz)  Body mass index is 37.53 kg/m².    Intake/Output Summary (Last 24 hours) at 2/15/2024 1020  Last data filed at 2/15/2024 0608  Gross per 24 hour   Intake 698.28 ml   Output 2600 ml   Net -1901.72 ml           Physical Exam  Constitutional:       General: She is not in acute distress.     Appearance: She is ill-appearing. She is not toxic-appearing.   HENT:      Mouth/Throat:      Mouth: Mucous membranes are moist.      Pharynx: Oropharynx is clear.   Eyes:      Conjunctiva/sclera: Conjunctivae normal.   Cardiovascular:      Rate and Rhythm: Normal rate and regular rhythm.      Heart sounds: Normal heart sounds.   Pulmonary:      Effort: Pulmonary effort is normal.      Breath sounds: Normal breath sounds.   Abdominal:      Palpations: Abdomen is soft.   Musculoskeletal:      Right lower leg: Edema present.      Left lower leg: Edema present.      Comments: BLE wound dressings in place   Skin:     General: Skin is warm and dry.   Neurological:      General: No focal deficit  present.      Mental Status: She is alert and oriented to person, place, and time.   Psychiatric:         Mood and Affect: Mood normal.         Behavior: Behavior normal.             Significant Labs: All pertinent labs within the past 24 hours have been reviewed.    Significant Imaging: I have reviewed all pertinent imaging results/findings within the past 24 hours.
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
## 2024-02-15 NOTE — PT/OT/SLP PROGRESS
Physical Therapy Treatment    Patient Name:  Tasha Hawley   MRN:  [id number]    Recommendations:     Discharge Recommendations: Moderate Intensity Therapy  Discharge Equipment Recommendations:  (TBD next level of care)  Barriers to discharge:  decreased mobility,strength and endurance    Assessment:     Tasha Hawley is a 68 y.o. female admitted with a medical diagnosis of Frequent falls.  She presents with the following impairments/functional limitations: weakness, impaired endurance, impaired functional mobility, gait instability, impaired balance, decreased lower extremity function, pain, decreased ROM, impaired coordination, decreased safety awareness, impaired skin, impaired joint extensibility, orthopedic precautions,pt with good participation and requires assistance with all mobility at this time,pt will benefit from moderate intensity therapy upon discharge.    Rehab Prognosis: Fair; patient would benefit from acute skilled PT services to address these deficits and reach maximum level of function.    Recent Surgery: * No surgery found *      Plan:     During this hospitalization, patient to be seen 5 x/week to address the identified rehab impairments via gait training, therapeutic activities, therapeutic exercises, neuromuscular re-education and progress toward the following goals:    Plan of Care Expires:  03/10/24    Subjective     Chief Complaint: B feet and leg pain increased in standing.  Patient/Family Comments/goals: pt agreeable to rx.  Pain/Comfort:  Pain Rating 1: 8/10 (in standing)  Location - Side 1: Bilateral  Location 1:  (B feet and legs)  Pain Addressed 1: Reposition, Distraction, Cessation of Activity  Pain Rating Post-Intervention 1: 8/10      Objective:     Communicated with nsg prior to session.  Patient found supine with   upon PT entry to room.     General Precautions: Standard, fall  Orthopedic Precautions: RLE weight bearing as tolerated, LLE weight bearing as  tolerated  Braces:  (B Darco's)  Respiratory Status:  supplemental O2     Functional Mobility:  Bed Mobility:     Supine to Sit: minimum assistance  Transfers:     Sit to Stand:  minimum assistance and moderate assistance with rolling walker  Bed to Chair: moderate assistance with  rolling walker  using  ambulation  Gait: amb ~10' with RW and Mod A with v/c's and flexed posture  Balance: fair sitting balance      AM-PAC 6 CLICK MOBILITY  Turning over in bed (including adjusting bedclothes, sheets and blankets)?: 3  Sitting down on and standing up from a chair with arms (e.g., wheelchair, bedside commode, etc.): 3  Moving from lying on back to sitting on the side of the bed?: 3  Moving to and from a bed to a chair (including a wheelchair)?: 2  Need to walk in hospital room?: 2  Climbing 3-5 steps with a railing?: 1  Basic Mobility Total Score: 14       Treatment & Education: le seated ex's x 10 reps inc: ap,laq's and hip flex,pt's needs and requests met      Patient left up in chair with all lines intact, call button in reach, and pct notified..    GOALS: see general POC  Multidisciplinary Problems       Physical Therapy Goals          Problem: Physical Therapy    Goal Priority Disciplines Outcome Goal Variances Interventions   Physical Therapy Goal     PT, PT/OT Ongoing, Progressing     Description: Goals to be met by: 3/10/2024    Patient will increase functional independence with mobility by performin. Bed mobility with Mod independence  2. Supine<>sit with Mod assist.    Revised Goals to be met by discharge date:  1.  Bed mobility with Mod Ind  2.  Txfs sup<>sit with SBA  3.  Txfs sit<>stand with SBA w RW use  4.  Pt to amb with BLE WBAT and B Darco shoes ~15' with CGA with RW without LOB during change in direction or straight path  5.  Pt to tolerate OOB chair > 1 hr                               Time Tracking:     PT Received On: 02/15/24  PT Start Time: 912     PT Stop Time: 939  PT Total Time (min):  27 min     Billable Minutes: Gait Training 16 and Therapeutic Exercise 11    Treatment Type: Treatment  PT/PTA: PTA     Number of PTA visits since last PT visit: 2     02/15/2024

## 2024-02-16 PROCEDURE — 25000003 PHARM REV CODE 250: Mod: HCNC | Performed by: STUDENT IN AN ORGANIZED HEALTH CARE EDUCATION/TRAINING PROGRAM

## 2024-02-16 PROCEDURE — 63600175 PHARM REV CODE 636 W HCPCS: Mod: HCNC | Performed by: STUDENT IN AN ORGANIZED HEALTH CARE EDUCATION/TRAINING PROGRAM

## 2024-02-16 PROCEDURE — 27000221 HC OXYGEN, UP TO 24 HOURS: Mod: HCNC

## 2024-02-16 PROCEDURE — 25000003 PHARM REV CODE 250: Mod: HCNC | Performed by: HOSPITALIST

## 2024-02-16 PROCEDURE — 99900035 HC TECH TIME PER 15 MIN (STAT): Mod: HCNC

## 2024-02-16 PROCEDURE — 97530 THERAPEUTIC ACTIVITIES: CPT | Mod: HCNC,CQ

## 2024-02-16 PROCEDURE — 11000001 HC ACUTE MED/SURG PRIVATE ROOM: Mod: HCNC

## 2024-02-16 PROCEDURE — 97116 GAIT TRAINING THERAPY: CPT | Mod: HCNC

## 2024-02-16 PROCEDURE — 94761 N-INVAS EAR/PLS OXIMETRY MLT: CPT | Mod: HCNC

## 2024-02-16 PROCEDURE — 94640 AIRWAY INHALATION TREATMENT: CPT | Mod: HCNC

## 2024-02-16 RX ADMIN — MUPIROCIN: 20 OINTMENT TOPICAL at 10:02

## 2024-02-16 RX ADMIN — FUROSEMIDE 20 MG: 10 INJECTION, SOLUTION INTRAMUSCULAR; INTRAVENOUS at 08:02

## 2024-02-16 RX ADMIN — ASPIRIN 81 MG: 81 TABLET, COATED ORAL at 08:02

## 2024-02-16 RX ADMIN — HYDROCORTISONE 10 MG: 5 TABLET ORAL at 10:02

## 2024-02-16 RX ADMIN — QUETIAPINE FUMARATE 200 MG: 100 TABLET ORAL at 10:02

## 2024-02-16 RX ADMIN — TRAZODONE HYDROCHLORIDE 300 MG: 100 TABLET ORAL at 10:02

## 2024-02-16 RX ADMIN — MUPIROCIN: 20 OINTMENT TOPICAL at 09:02

## 2024-02-16 RX ADMIN — ACETAMINOPHEN 650 MG: 325 TABLET ORAL at 07:02

## 2024-02-16 RX ADMIN — HYDROCODONE BITARTRATE AND ACETAMINOPHEN 1 TABLET: 10; 325 TABLET ORAL at 06:02

## 2024-02-16 RX ADMIN — FLUDROCORTISONE ACETATE 100 MCG: 0.1 TABLET ORAL at 08:02

## 2024-02-16 RX ADMIN — Medication 2 TABLET: at 08:02

## 2024-02-16 RX ADMIN — FUROSEMIDE 20 MG: 10 INJECTION, SOLUTION INTRAMUSCULAR; INTRAVENOUS at 10:02

## 2024-02-16 RX ADMIN — TIOTROPIUM BROMIDE INHALATION SPRAY 2 PUFF: 3.12 SPRAY, METERED RESPIRATORY (INHALATION) at 08:02

## 2024-02-16 RX ADMIN — ENOXAPARIN SODIUM 40 MG: 40 INJECTION SUBCUTANEOUS at 05:02

## 2024-02-16 RX ADMIN — Medication 2 TABLET: at 10:02

## 2024-02-16 RX ADMIN — ATORVASTATIN CALCIUM 80 MG: 40 TABLET, FILM COATED ORAL at 08:02

## 2024-02-16 RX ADMIN — FLUTICASONE PROPIONATE 100 MCG: 50 SPRAY, METERED NASAL at 08:02

## 2024-02-16 RX ADMIN — OXYBUTYNIN CHLORIDE 5 MG: 5 TABLET, EXTENDED RELEASE ORAL at 08:02

## 2024-02-16 RX ADMIN — DULOXETINE 60 MG: 30 CAPSULE, DELAYED RELEASE ORAL at 10:02

## 2024-02-16 RX ADMIN — HYDROCODONE BITARTRATE AND ACETAMINOPHEN 1 TABLET: 10; 325 TABLET ORAL at 08:02

## 2024-02-16 RX ADMIN — DULOXETINE 60 MG: 30 CAPSULE, DELAYED RELEASE ORAL at 08:02

## 2024-02-16 RX ADMIN — LEVOTHYROXINE SODIUM 150 MCG: 25 TABLET ORAL at 05:02

## 2024-02-16 RX ADMIN — SENNOSIDES 2 TABLET: 8.6 TABLET, FILM COATED ORAL at 08:02

## 2024-02-16 RX ADMIN — SENNOSIDES 2 TABLET: 8.6 TABLET, FILM COATED ORAL at 10:02

## 2024-02-16 NOTE — PT/OT/SLP PROGRESS
Physical Therapy Treatment    Patient Name:  Tasha Hawley   MRN:  [id number]    Recommendations:     Discharge Recommendations: Moderate Intensity Therapy  Discharge Equipment Recommendations:  (TBD next level of care)  Barriers to discharge:  decreased mobility,strength and endurance    Assessment:     Tasha Hawley is a 68 y.o. female admitted with a medical diagnosis of Frequent falls.  She presents with the following impairments/functional limitations: weakness, impaired endurance, impaired functional mobility, gait instability, impaired balance, decreased lower extremity function, pain, decreased ROM, impaired coordination, impaired skin, impaired joint extensibility, orthopedic precautions,pt with good participation and requires assistance with all mobility at this time,increased flexed posture with gait,pt will benefit from moderate intensity therapy upon discharge    Rehab Prognosis: Fair; patient would benefit from acute skilled PT services to address these deficits and reach maximum level of function.    Recent Surgery: * No surgery found *      Plan:     During this hospitalization, patient to be seen 5 x/week to address the identified rehab impairments via gait training, therapeutic activities, therapeutic exercises, neuromuscular re-education and progress toward the following goals:    Plan of Care Expires:  03/10/24    Subjective     Chief Complaint: B leg and feet pain.  Patient/Family Comments/goals: pt pleased with increased gait this AM.  Pain/Comfort:  Pain Rating 1: 8/10  Location - Side 1: Bilateral  Location - Orientation 1: generalized  Location 1:  (feet and legs)  Pain Addressed 1: Reposition, Distraction, Pre-medicate for activity, Cessation of Activity  Pain Rating Post-Intervention 1: 8/10      Objective:     Communicated with nsg prior to session.  Patient found supine with oxygen, PureWick, peripheral IV upon PT entry to room.     General Precautions: Standard, fall  Orthopedic  Precautions: RLE weight bearing as tolerated, LLE weight bearing as tolerated  Braces:  (B Darco's)  Respiratory Status:  supplemental O2     Functional Mobility:  Bed Mobility:     Supine to Sit: minimum assistance  Transfers:     Sit to Stand:  minimum assistance and moderate assistance with rolling walker  Bed to Chair: moderate assistance and increased time with  rolling walker and flexed posture  using  ambulation  Gait: amb ~28' with RW and Min/Mod A  with flexed posture and v/c's  Balance: poor standing balance      AM-PAC 6 CLICK MOBILITY  Turning over in bed (including adjusting bedclothes, sheets and blankets)?: 3  Sitting down on and standing up from a chair with arms (e.g., wheelchair, bedside commode, etc.): 3  Moving from lying on back to sitting on the side of the bed?: 3  Moving to and from a bed to a chair (including a wheelchair)?: 2  Need to walk in hospital room?: 2  Climbing 3-5 steps with a railing?: 1  Basic Mobility Total Score: 14       Treatment & Education: pt requires Max/Dep A for donning B Darco shoes,increased time required with mobility      Patient left up in chair with all lines intact, call button in reach, and nsg notified..    GOALS: see general POC  Multidisciplinary Problems       Physical Therapy Goals          Problem: Physical Therapy    Goal Priority Disciplines Outcome Goal Variances Interventions   Physical Therapy Goal     PT, PT/OT Ongoing, Progressing     Description: Goals to be met by: 3/10/2024    Patient will increase functional independence with mobility by performin. Bed mobility with Mod independence  2. Supine<>sit with Mod assist.    Revised Goals to be met by discharge date:  1.  Bed mobility with Mod Ind  2.  Txfs sup<>sit with SBA  3.  Txfs sit<>stand with SBA w RW use  4.  Pt to amb with BLE WBAT and B Darco shoes ~15' with CGA with RW without LOB during change in direction or straight path  5.  Pt to tolerate OOB chair > 1 hr                                Time Tracking:     PT Received On: 02/16/24  PT Start Time: 0938     PT Stop Time: 1009  PT Total Time (min): 31 min     Billable Minutes: Gait Training 23 and Therapeutic Activity 8    Treatment Type: Treatment  PT/PTA: PTA     Number of PTA visits since last PT visit: 3     02/16/2024

## 2024-02-16 NOTE — SUBJECTIVE & OBJECTIVE
Interval History: awaiting on placement    Review of Systems   Constitutional:  Negative for chills, fatigue and fever.   Respiratory:  Negative for cough and shortness of breath.    Cardiovascular:  Negative for chest pain, palpitations and leg swelling.   Gastrointestinal:  Negative for abdominal pain, constipation, diarrhea and nausea.   Musculoskeletal:  Positive for arthralgias. Negative for back pain.   Neurological:  Negative for dizziness and headaches.   Psychiatric/Behavioral:  Negative for agitation and confusion.      Objective:     Vital Signs (Most Recent):  Temp: 97.1 °F (36.2 °C) (02/16/24 1132)  Pulse: 66 (02/16/24 1132)  Resp: 17 (02/16/24 1132)  BP: (!) 99/56 (02/16/24 1132)  SpO2: (!) 93 % (02/16/24 0815) Vital Signs (24h Range):  Temp:  [97.1 °F (36.2 °C)-98.1 °F (36.7 °C)] 97.1 °F (36.2 °C)  Pulse:  [61-80] 66  Resp:  [17-21] 17  SpO2:  [93 %-100 %] 93 %  BP: ()/(53-60) 99/56     Weight: 107.5 kg (236 lb 15.9 oz)  Body mass index is 37.12 kg/m².    Intake/Output Summary (Last 24 hours) at 2/16/2024 1231  Last data filed at 2/16/2024 0537  Gross per 24 hour   Intake 949.49 ml   Output 1275 ml   Net -325.51 ml           Physical Exam  Vitals and nursing note reviewed.   Constitutional:       General: She is not in acute distress.     Appearance: She is ill-appearing. She is not toxic-appearing.   HENT:      Mouth/Throat:      Mouth: Mucous membranes are moist.      Pharynx: Oropharynx is clear.   Eyes:      Conjunctiva/sclera: Conjunctivae normal.   Cardiovascular:      Rate and Rhythm: Normal rate and regular rhythm.      Heart sounds: Normal heart sounds.   Pulmonary:      Effort: Pulmonary effort is normal.      Breath sounds: Normal breath sounds.   Abdominal:      Palpations: Abdomen is soft.   Musculoskeletal:      Right lower leg: Edema present.      Left lower leg: Edema present.      Comments: BLE wound dressings in place   Skin:     General: Skin is warm and dry.   Neurological:       General: No focal deficit present.      Mental Status: She is alert and oriented to person, place, and time.   Psychiatric:         Mood and Affect: Mood normal.         Behavior: Behavior normal.             Significant Labs: All pertinent labs within the past 24 hours have been reviewed.    Significant Imaging: I have reviewed all pertinent imaging results/findings within the past 24 hours.

## 2024-02-16 NOTE — PLAN OF CARE
Pts safety maintained, bed alarm on, call bell in reach. Meds given per MAR. Asking for pan meds, but asleep after her night time Seroquel and trazodone till am with no complains. Dressing on BLE changed by wound care nurse, WEST. No N/V, SOB, distress during night. Vitals stable.

## 2024-02-16 NOTE — PLAN OF CARE
Spoke with daughter. Spoke with 's office. Reports case has been closed since 2006. Workman's Comp office contacted and online records request is required. Forwarded info to daughter to complete as fee is attached. Still pending assessing pain needs at DC if pain pump needs to be changed.       02/16/24 1226   Post-Acute Status   Post-Acute Authorization Placement   Post-Acute Placement Status Pending medical clearance/testing

## 2024-02-16 NOTE — PROGRESS NOTES
[facility] Medicine  Telemedicine Progress Note    Patient Name: Tasha Hawley  MRN: [id number]  Patient Class: IP- Inpatient   Admission Date: 2/9/2024  Length of Stay: 6 days  Attending Physician: Rebecca Chery MD  Primary Care Provider: Mesfin Hodges II, MD          Subjective:     Principal Problem:Frequent falls        HPI:  Tasha Hawley is a 68-year-old female with past medical history of chronic lymphedema, heart failure, depression who presents due to worsening lower extremity swelling, leaking from indwelling catheter, and repeated falls per .     is at bedside and provides much of the history.  He reports that over the past few weeks, his wife has been having multiple falls due to unsteady gait partly due to her worsening lower extremity edema.  He reports despite taking the oral Lasix, patient has lower extremity edema,which has worsened with blistering occurring.  Also reports some chest pain with shortness of breath.  He wishes for possible temporary placement due to difficulty taking care of her.    He also was concerned that she has not had replacement of her indwelling suprapubic catheter.  He reports consistent leaking and wishes to get that addressed.  Patient presents for further evaluation.    Triage vitals were significant for hypoxemia.  Review of systems significant for leg swelling.  Physical exam significant for lower extremity swelling and suprapubic catheter without significant erythema in surrounding skin.    CBC significant for normocytic anemia.  CMP significant for elevated ALP.  BNP and troponin negative.  UA with bacteria and WBCs.  Urine culture pending.    CT head without contrast is negative for any acute abnormality.    Chest x-ray concerning for hiatal hernia but no obvious cardiopulmonary infectious etiology noted.    Patient admitted for falls, UA, diuresis and lower extremity wound management.      Overview/Hospital  Course:  No notes on file    Interval History: awaiting on placement    Review of Systems   Constitutional:  Negative for chills, fatigue and fever.   Respiratory:  Negative for cough and shortness of breath.    Cardiovascular:  Negative for chest pain, palpitations and leg swelling.   Gastrointestinal:  Negative for abdominal pain, constipation, diarrhea and nausea.   Musculoskeletal:  Positive for arthralgias. Negative for back pain.   Neurological:  Negative for dizziness and headaches.   Psychiatric/Behavioral:  Negative for agitation and confusion.      Objective:     Vital Signs (Most Recent):  Temp: 97.1 °F (36.2 °C) (02/16/24 1132)  Pulse: 66 (02/16/24 1132)  Resp: 17 (02/16/24 1132)  BP: (!) 99/56 (02/16/24 1132)  SpO2: (!) 93 % (02/16/24 0815) Vital Signs (24h Range):  Temp:  [97.1 °F (36.2 °C)-98.1 °F (36.7 °C)] 97.1 °F (36.2 °C)  Pulse:  [61-80] 66  Resp:  [17-21] 17  SpO2:  [93 %-100 %] 93 %  BP: ()/(53-60) 99/56     Weight: 107.5 kg (236 lb 15.9 oz)  Body mass index is 37.12 kg/m².    Intake/Output Summary (Last 24 hours) at 2/16/2024 1231  Last data filed at 2/16/2024 0537  Gross per 24 hour   Intake 949.49 ml   Output 1275 ml   Net -325.51 ml           Physical Exam  Vitals and nursing note reviewed.   Constitutional:       General: She is not in acute distress.     Appearance: She is ill-appearing. She is not toxic-appearing.   HENT:      Mouth/Throat:      Mouth: Mucous membranes are moist.      Pharynx: Oropharynx is clear.   Eyes:      Conjunctiva/sclera: Conjunctivae normal.   Cardiovascular:      Rate and Rhythm: Normal rate and regular rhythm.      Heart sounds: Normal heart sounds.   Pulmonary:      Effort: Pulmonary effort is normal.      Breath sounds: Normal breath sounds.   Abdominal:      Palpations: Abdomen is soft.   Musculoskeletal:      Right lower leg: Edema present.      Left lower leg: Edema present.      Comments: BLE wound dressings in place   Skin:     General: Skin is  warm and dry.   Neurological:      General: No focal deficit present.      Mental Status: She is alert and oriented to person, place, and time.   Psychiatric:         Mood and Affect: Mood normal.         Behavior: Behavior normal.             Significant Labs: All pertinent labs within the past 24 hours have been reviewed.    Significant Imaging: I have reviewed all pertinent imaging results/findings within the past 24 hours.    Assessment/Plan:      * Frequent falls  Frequent falls over the past few weeks  Has been unable to take care of the patient  Patient has a history of being reluctant to placement & typically requests discharge home in the past    Plan:  PT/OT consult - prior h/o metatarsal fractures. bilateral foot xray ordered to determine WB status - ?new non displaced fracture, ortho consult - no intervention indicated, WBAT, hard sole shoe  Case management consult to assist with placement    Chronic hypoxic respiratory failure  Patient with Hypercapnic and Hypoxic Respiratory failure which is Chronic.  she is on home oxygen at 2-3 LPM.     .       Adrenal insufficiency  Continue with the patient's home regimen    Consider increasing to stress dose while hospitalized. However pt is currently asymptomatic and HDS    Normocytic anemia  Patient's anemia is currently controlled. Has not received any PRBCs to date. Etiology likely d/t Iron deficiency  Current CBC reviewed-   Lab Results   Component Value Date    HGB 10.5 (L) 02/15/2024    HCT 35.0 (L) 02/15/2024     Monitor serial CBC and transfuse if patient becomes hemodynamically unstable, symptomatic or H/H drops below 7/21.    UTI (urinary tract infection)  Present on admission - CAUTI  UA with pyuria  Possible colonization due to suprapubic catheter but pt reports weakness which she usually has with UTIs    Continue ceftriaxone, abx X 7 days. Urine cx growing Ecoli  Urology consult for suprapubic catheter exchange. S/p catheter exchange on  2/12      Suprapubic catheter  Noted    Currently on Rocephin      Lymphedema of both lower extremities  Significant lymphedema in bilateral extremities  Blistering noted on both    IV diuresis (dose lowered due to hypotension)  Wound care      Hypothyroidism (acquired)  Unclear patient was taking both Synthroid and Cytomel  Continue home regimen    Severe obesity (BMI 35.0-39.9) with comorbidity  Body mass index is 37.12 kg/m². Morbid obesity complicates all aspects of disease management from diagnostic modalities to treatment. Weight loss encouraged and health benefits explained to patient.         Narcotic dependency, continuous  Pt has a SC dilaudid pump managed by pain clinic, Dr. Hillman    Pt requesting for IV dilaudid. Discussed with refrain from parenteral narcotics given the SC dilaudid pump.   Pt in agreement with low dose PRN oxycodone, tylenol for breakthrough pain    Will continue to avoid IV narcotics. However, Per pharmacy, pump change is due in 3 days which cannot be done in the hospital so once dilaudid pump is empty can get equivalent SC dilaudid.      Generalized anxiety disorder  Continue with patient's home medications        VTE Risk Mitigation (From admission, onward)           Ordered     enoxaparin injection 40 mg  Every 24 hours         02/11/24 0840     IP VTE HIGH RISK PATIENT  Once         02/09/24 1811     Place sequential compression device  Until discontinued         02/09/24 1811                          I have completed this tele-visit without the assistance of a telepresenter.    The attending portion of this evaluation, treatment, and documentation was performed per Edy Theodore NP via Telemedicine AudioVisual using the secure [employer] software platform with 2 way audio/video. The provider was located off-site and the patient is located in the hospital. The aforementioned video software was utilized to document the relevant history and physical exam    Edy SANDOVAL  SHONDA Theodore  Department of Hospital Medicine   [facility]

## 2024-02-17 LAB
BASOPHILS # BLD AUTO: 0.02 K/UL (ref 0–0.2)
BASOPHILS NFR BLD: 0.3 % (ref 0–1.9)
DIFFERENTIAL METHOD BLD: ABNORMAL
EOSINOPHIL # BLD AUTO: 0.4 K/UL (ref 0–0.5)
EOSINOPHIL NFR BLD: 5.5 % (ref 0–8)
ERYTHROCYTE [DISTWIDTH] IN BLOOD BY AUTOMATED COUNT: 15.6 % (ref 11.5–14.5)
HCT VFR BLD AUTO: 34.8 % (ref 37–48.5)
HGB BLD-MCNC: 10.3 G/DL (ref 12–16)
IMM GRANULOCYTES # BLD AUTO: 0.01 K/UL (ref 0–0.04)
IMM GRANULOCYTES NFR BLD AUTO: 0.2 % (ref 0–0.5)
LYMPHOCYTES # BLD AUTO: 1.3 K/UL (ref 1–4.8)
LYMPHOCYTES NFR BLD: 20.1 % (ref 18–48)
MCH RBC QN AUTO: 27.1 PG (ref 27–31)
MCHC RBC AUTO-ENTMCNC: 29.6 G/DL (ref 32–36)
MCV RBC AUTO: 92 FL (ref 82–98)
MONOCYTES # BLD AUTO: 0.4 K/UL (ref 0.3–1)
MONOCYTES NFR BLD: 6.6 % (ref 4–15)
NEUTROPHILS # BLD AUTO: 4.3 K/UL (ref 1.8–7.7)
NEUTROPHILS NFR BLD: 67.3 % (ref 38–73)
NRBC BLD-RTO: 0 /100 WBC
PLATELET # BLD AUTO: 167 K/UL (ref 150–450)
PMV BLD AUTO: 9.7 FL (ref 9.2–12.9)
RBC # BLD AUTO: 3.8 M/UL (ref 4–5.4)
WBC # BLD AUTO: 6.33 K/UL (ref 3.9–12.7)

## 2024-02-17 PROCEDURE — 63600175 PHARM REV CODE 636 W HCPCS: Mod: HCNC | Performed by: STUDENT IN AN ORGANIZED HEALTH CARE EDUCATION/TRAINING PROGRAM

## 2024-02-17 PROCEDURE — 94640 AIRWAY INHALATION TREATMENT: CPT | Mod: HCNC

## 2024-02-17 PROCEDURE — 25000003 PHARM REV CODE 250: Mod: HCNC | Performed by: STUDENT IN AN ORGANIZED HEALTH CARE EDUCATION/TRAINING PROGRAM

## 2024-02-17 PROCEDURE — 99900035 HC TECH TIME PER 15 MIN (STAT): Mod: HCNC

## 2024-02-17 PROCEDURE — 25000003 PHARM REV CODE 250: Mod: HCNC | Performed by: HOSPITALIST

## 2024-02-17 PROCEDURE — 27000221 HC OXYGEN, UP TO 24 HOURS: Mod: HCNC

## 2024-02-17 PROCEDURE — 85025 COMPLETE CBC W/AUTO DIFF WBC: CPT | Mod: HCNC | Performed by: STUDENT IN AN ORGANIZED HEALTH CARE EDUCATION/TRAINING PROGRAM

## 2024-02-17 PROCEDURE — 94761 N-INVAS EAR/PLS OXIMETRY MLT: CPT | Mod: HCNC

## 2024-02-17 PROCEDURE — 36415 COLL VENOUS BLD VENIPUNCTURE: CPT | Mod: HCNC | Performed by: STUDENT IN AN ORGANIZED HEALTH CARE EDUCATION/TRAINING PROGRAM

## 2024-02-17 PROCEDURE — 11000001 HC ACUTE MED/SURG PRIVATE ROOM: Mod: HCNC

## 2024-02-17 RX ADMIN — CEFTRIAXONE 1 G: 1 INJECTION, POWDER, FOR SOLUTION INTRAMUSCULAR; INTRAVENOUS at 12:02

## 2024-02-17 RX ADMIN — DULOXETINE 60 MG: 30 CAPSULE, DELAYED RELEASE ORAL at 08:02

## 2024-02-17 RX ADMIN — HYDROCODONE BITARTRATE AND ACETAMINOPHEN 1 TABLET: 10; 325 TABLET ORAL at 09:02

## 2024-02-17 RX ADMIN — CEFTRIAXONE 1 G: 1 INJECTION, POWDER, FOR SOLUTION INTRAMUSCULAR; INTRAVENOUS at 11:02

## 2024-02-17 RX ADMIN — Medication 2 TABLET: at 08:02

## 2024-02-17 RX ADMIN — TIOTROPIUM BROMIDE INHALATION SPRAY 2 PUFF: 3.12 SPRAY, METERED RESPIRATORY (INHALATION) at 08:02

## 2024-02-17 RX ADMIN — HYDROCODONE BITARTRATE AND ACETAMINOPHEN 1 TABLET: 10; 325 TABLET ORAL at 10:02

## 2024-02-17 RX ADMIN — QUETIAPINE FUMARATE 200 MG: 100 TABLET ORAL at 08:02

## 2024-02-17 RX ADMIN — SENNOSIDES 2 TABLET: 8.6 TABLET, FILM COATED ORAL at 08:02

## 2024-02-17 RX ADMIN — FUROSEMIDE 20 MG: 10 INJECTION, SOLUTION INTRAMUSCULAR; INTRAVENOUS at 08:02

## 2024-02-17 RX ADMIN — LEVOTHYROXINE SODIUM 150 MCG: 25 TABLET ORAL at 05:02

## 2024-02-17 RX ADMIN — HYDROCORTISONE 10 MG: 5 TABLET ORAL at 08:02

## 2024-02-17 RX ADMIN — TRAZODONE HYDROCHLORIDE 300 MG: 100 TABLET ORAL at 08:02

## 2024-02-17 RX ADMIN — OXYBUTYNIN CHLORIDE 5 MG: 5 TABLET, EXTENDED RELEASE ORAL at 09:02

## 2024-02-17 RX ADMIN — ASPIRIN 81 MG: 81 TABLET, COATED ORAL at 08:02

## 2024-02-17 RX ADMIN — FLUDROCORTISONE ACETATE 100 MCG: 0.1 TABLET ORAL at 08:02

## 2024-02-17 RX ADMIN — HYDROCODONE BITARTRATE AND ACETAMINOPHEN 1 TABLET: 10; 325 TABLET ORAL at 04:02

## 2024-02-17 RX ADMIN — ATORVASTATIN CALCIUM 80 MG: 40 TABLET, FILM COATED ORAL at 08:02

## 2024-02-17 RX ADMIN — ENOXAPARIN SODIUM 40 MG: 40 INJECTION SUBCUTANEOUS at 04:02

## 2024-02-17 NOTE — PLAN OF CARE
AAOx3. Medication administered per order. 3L NC. C/o bilateral leg pain. PRN Norco administered per order. CHG / meadows care complete. Pt sat up in chair for lunch. 2 person assist to ambulate . Safety maintained. Call light within reach. Bed alarm active. Pt encouraged to call for assistance.     Problem: Impaired Wound Healing  Goal: Optimal Wound Healing  Outcome: Ongoing, Progressing     Problem: Skin Injury Risk Increased  Goal: Skin Health and Integrity  Outcome: Ongoing, Progressing     Problem: Fall Injury Risk  Goal: Absence of Fall and Fall-Related Injury  Outcome: Ongoing, Progressing     Problem: Pain Chronic (Persistent) (Comorbidity Management)  Goal: Acceptable Pain Control and Functional Ability  Outcome: Ongoing, Progressing

## 2024-02-17 NOTE — PROGRESS NOTES
[facility] Medicine  Telemedicine Progress Note    Patient Name: Tasha Hawley  MRN: [id number]  Patient Class: IP- Inpatient   Admission Date: 2/9/2024  Length of Stay: 7 days  Attending Physician: Rebecca Chery MD  Primary Care Provider: Mesfin Hodges II, MD          Subjective:     Principal Problem:Frequent falls        HPI:  Tasha Hawley is a 68-year-old female with past medical history of chronic lymphedema, heart failure, depression who presents due to worsening lower extremity swelling, leaking from indwelling catheter, and repeated falls per .     is at bedside and provides much of the history.  He reports that over the past few weeks, his wife has been having multiple falls due to unsteady gait partly due to her worsening lower extremity edema.  He reports despite taking the oral Lasix, patient has lower extremity edema,which has worsened with blistering occurring.  Also reports some chest pain with shortness of breath.  He wishes for possible temporary placement due to difficulty taking care of her.    He also was concerned that she has not had replacement of her indwelling suprapubic catheter.  He reports consistent leaking and wishes to get that addressed.  Patient presents for further evaluation.    Triage vitals were significant for hypoxemia.  Review of systems significant for leg swelling.  Physical exam significant for lower extremity swelling and suprapubic catheter without significant erythema in surrounding skin.    CBC significant for normocytic anemia.  CMP significant for elevated ALP.  BNP and troponin negative.  UA with bacteria and WBCs.  Urine culture pending.    CT head without contrast is negative for any acute abnormality.    Chest x-ray concerning for hiatal hernia but no obvious cardiopulmonary infectious etiology noted.    Patient admitted for falls, UA, diuresis and lower extremity wound management.      Overview/Hospital  Course:  No notes on file    Interval History: awaiting on placement    Review of Systems   Constitutional:  Negative for chills, fatigue and fever.   Respiratory:  Negative for cough and shortness of breath.    Cardiovascular:  Negative for chest pain, palpitations and leg swelling.   Gastrointestinal:  Negative for abdominal pain, constipation, diarrhea and nausea.   Musculoskeletal:  Positive for arthralgias. Negative for back pain.   Neurological:  Negative for dizziness and headaches.   Psychiatric/Behavioral:  Negative for agitation and confusion.      Objective:     Vital Signs (Most Recent):  Temp: 97.6 °F (36.4 °C) (02/17/24 0740)  Pulse: 65 (02/17/24 0819)  Resp: 18 (02/17/24 0905)  BP: (!) 100/50 (02/17/24 0740)  SpO2: 96 % (02/17/24 0819) Vital Signs (24h Range):  Temp:  [97.1 °F (36.2 °C)-98.2 °F (36.8 °C)] 97.6 °F (36.4 °C)  Pulse:  [52-73] 65  Resp:  [17-20] 18  SpO2:  [95 %-100 %] 96 %  BP: ()/(50-59) 100/50     Weight: 107.7 kg (237 lb 7 oz)  Body mass index is 37.19 kg/m².    Intake/Output Summary (Last 24 hours) at 2/17/2024 1016  Last data filed at 2/17/2024 0905  Gross per 24 hour   Intake 740 ml   Output 3025 ml   Net -2285 ml           Physical Exam  Vitals and nursing note reviewed.   Constitutional:       Appearance: She is ill-appearing.   HENT:      Mouth/Throat:      Mouth: Mucous membranes are moist.   Eyes:      Extraocular Movements: Extraocular movements intact.   Cardiovascular:      Rate and Rhythm: Normal rate and regular rhythm.   Pulmonary:      Effort: Pulmonary effort is normal.   Musculoskeletal:      Right lower leg: Edema present.      Left lower leg: Edema present.      Comments: BLE wound dressings in place   Skin:     General: Skin is warm and dry.   Neurological:      Mental Status: She is alert and oriented to person, place, and time.   Psychiatric:         Mood and Affect: Mood normal.         Behavior: Behavior normal.             Significant Labs: All pertinent  labs within the past 24 hours have been reviewed.    Significant Imaging: I have reviewed all pertinent imaging results/findings within the past 24 hours.    Assessment/Plan:      * Frequent falls  Frequent falls over the past few weeks  Has been unable to take care of the patient  Patient has a history of being reluctant to placement & typically requests discharge home in the past    Plan:  PT/OT consult - prior h/o metatarsal fractures. bilateral foot xray ordered to determine WB status - ?new non displaced fracture, ortho consult - no intervention indicated, WBAT, hard sole shoe  Case management consult to assist with placement    Chronic hypoxic respiratory failure  Patient with Hypercapnic and Hypoxic Respiratory failure which is Chronic.  she is on home oxygen at 2-3 LPM.     .       Adrenal insufficiency  Continue with the patient's home regimen    Consider increasing to stress dose while hospitalized. However pt is currently asymptomatic and HDS    Normocytic anemia  Patient's anemia is currently controlled. Has not received any PRBCs to date. Etiology likely d/t Iron deficiency  Current CBC reviewed-   Lab Results   Component Value Date    HGB 10.3 (L) 02/17/2024    HCT 34.8 (L) 02/17/2024     Monitor serial CBC and transfuse if patient becomes hemodynamically unstable, symptomatic or H/H drops below 7/21.    UTI (urinary tract infection)  Present on admission - CAUTI  UA with pyuria  Possible colonization due to suprapubic catheter but pt reports weakness which she usually has with UTIs    Continue ceftriaxone, abx X 7 days. Urine cx growing Ecoli  Urology consult for suprapubic catheter exchange. S/p catheter exchange on 2/12      Suprapubic catheter  Noted    Currently on Rocephin      Lymphedema of both lower extremities  Significant lymphedema in bilateral extremities  Blistering noted on both    IV diuresis (dose lowered due to hypotension)  Wound care      Hypothyroidism (acquired)  Unclear patient  was taking both Synthroid and Cytomel  Continue home regimen    Severe obesity (BMI 35.0-39.9) with comorbidity  Body mass index is 37.19 kg/m². Morbid obesity complicates all aspects of disease management from diagnostic modalities to treatment. Weight loss encouraged and health benefits explained to patient.         Narcotic dependency, continuous  Pt has a SC dilaudid pump managed by pain clinic, Dr. Hillman    Pt requesting for IV dilaudid. Discussed with refrain from parenteral narcotics given the SC dilaudid pump.   Pt in agreement with low dose PRN oxycodone, tylenol for breakthrough pain    Will continue to avoid IV narcotics. However, Per pharmacy, pump change is due in 3 days which cannot be done in the hospital so once dilaudid pump is empty can get equivalent SC dilaudid.      Generalized anxiety disorder  Continue with patient's home medications        VTE Risk Mitigation (From admission, onward)           Ordered     enoxaparin injection 40 mg  Every 24 hours         02/11/24 0840     IP VTE HIGH RISK PATIENT  Once         02/09/24 1811     Place sequential compression device  Until discontinued         02/09/24 1811                          I have completed this tele-visit without the assistance of a telepresenter.    The attending portion of this evaluation, treatment, and documentation was performed per Edy Theodore NP via Telemedicine AudioVisual using the secure [employer] software platform with 2 way audio/video. The provider was located off-site and the patient is located in the hospital. The aforementioned video software was utilized to document the relevant history and physical exam    Edy Theodore NP  Department of Hospital Medicine   [facility]

## 2024-02-17 NOTE — ASSESSMENT & PLAN NOTE
Body mass index is 37.19 kg/m². Morbid obesity complicates all aspects of disease management from diagnostic modalities to treatment. Weight loss encouraged and health benefits explained to patient.

## 2024-02-17 NOTE — SUBJECTIVE & OBJECTIVE
Interval History: awaiting on placement    Review of Systems   Constitutional:  Negative for chills, fatigue and fever.   Respiratory:  Negative for cough and shortness of breath.    Cardiovascular:  Negative for chest pain, palpitations and leg swelling.   Gastrointestinal:  Negative for abdominal pain, constipation, diarrhea and nausea.   Musculoskeletal:  Positive for arthralgias. Negative for back pain.   Neurological:  Negative for dizziness and headaches.   Psychiatric/Behavioral:  Negative for agitation and confusion.      Objective:     Vital Signs (Most Recent):  Temp: 97.6 °F (36.4 °C) (02/17/24 0740)  Pulse: 65 (02/17/24 0819)  Resp: 18 (02/17/24 0905)  BP: (!) 100/50 (02/17/24 0740)  SpO2: 96 % (02/17/24 0819) Vital Signs (24h Range):  Temp:  [97.1 °F (36.2 °C)-98.2 °F (36.8 °C)] 97.6 °F (36.4 °C)  Pulse:  [52-73] 65  Resp:  [17-20] 18  SpO2:  [95 %-100 %] 96 %  BP: ()/(50-59) 100/50     Weight: 107.7 kg (237 lb 7 oz)  Body mass index is 37.19 kg/m².    Intake/Output Summary (Last 24 hours) at 2/17/2024 1016  Last data filed at 2/17/2024 0905  Gross per 24 hour   Intake 740 ml   Output 3025 ml   Net -2285 ml           Physical Exam  Vitals and nursing note reviewed.   Constitutional:       Appearance: She is ill-appearing.   HENT:      Mouth/Throat:      Mouth: Mucous membranes are moist.   Eyes:      Extraocular Movements: Extraocular movements intact.   Cardiovascular:      Rate and Rhythm: Normal rate and regular rhythm.   Pulmonary:      Effort: Pulmonary effort is normal.   Musculoskeletal:      Right lower leg: Edema present.      Left lower leg: Edema present.      Comments: BLE wound dressings in place   Skin:     General: Skin is warm and dry.   Neurological:      Mental Status: She is alert and oriented to person, place, and time.   Psychiatric:         Mood and Affect: Mood normal.         Behavior: Behavior normal.             Significant Labs: All pertinent labs within the past 24  hours have been reviewed.    Significant Imaging: I have reviewed all pertinent imaging results/findings within the past 24 hours.

## 2024-02-17 NOTE — ASSESSMENT & PLAN NOTE
Patient's anemia is currently controlled. Has not received any PRBCs to date. Etiology likely d/t Iron deficiency  Current CBC reviewed-   Lab Results   Component Value Date    HGB 10.3 (L) 02/17/2024    HCT 34.8 (L) 02/17/2024     Monitor serial CBC and transfuse if patient becomes hemodynamically unstable, symptomatic or H/H drops below 7/21.

## 2024-02-17 NOTE — PLAN OF CARE
Problem: Adult Inpatient Plan of Care  Goal: Plan of Care Review  Outcome: Ongoing, Progressing     Problem: Fall Injury Risk  Goal: Absence of Fall and Fall-Related Injury  Outcome: Ongoing, Progressing  Intervention: Promote Injury-Free Environment  Flowsheets (Taken 2/17/2024 0543)  Safety Promotion/Fall Prevention:   assistive device/personal item within reach   bed alarm set     Problem: COPD (Chronic Obstructive Pulmonary Disease) Comorbidity  Goal: Maintenance of COPD Symptom Control  Outcome: Ongoing, Progressing  Intervention: Maintain COPD-Symptom Control  Flowsheets (Taken 2/17/2024 0543)  Medication Review/Management: medications reviewed     Problem: Heart Failure Comorbidity  Goal: Maintenance of Heart Failure Symptom Control  Outcome: Ongoing, Progressing  Intervention: Maintain Heart Failure-Management  Flowsheets (Taken 2/17/2024 0543)  Medication Review/Management: medications reviewed

## 2024-02-18 PROBLEM — J18.9 PNEUMONIA: Status: ACTIVE | Noted: 2024-02-18

## 2024-02-18 LAB
ANION GAP SERPL CALC-SCNC: 11 MMOL/L (ref 8–16)
BASOPHILS # BLD AUTO: 0.03 K/UL (ref 0–0.2)
BASOPHILS NFR BLD: 0.4 % (ref 0–1.9)
BUN SERPL-MCNC: 12 MG/DL (ref 8–23)
CALCIUM SERPL-MCNC: 8.7 MG/DL (ref 8.7–10.5)
CHLORIDE SERPL-SCNC: 94 MMOL/L (ref 95–110)
CO2 SERPL-SCNC: 35 MMOL/L (ref 23–29)
CREAT SERPL-MCNC: 0.8 MG/DL (ref 0.5–1.4)
DIFFERENTIAL METHOD BLD: ABNORMAL
EOSINOPHIL # BLD AUTO: 0.4 K/UL (ref 0–0.5)
EOSINOPHIL NFR BLD: 4.8 % (ref 0–8)
ERYTHROCYTE [DISTWIDTH] IN BLOOD BY AUTOMATED COUNT: 15.6 % (ref 11.5–14.5)
EST. GFR  (NO RACE VARIABLE): >60 ML/MIN/1.73 M^2
GLUCOSE SERPL-MCNC: 121 MG/DL (ref 70–110)
HCT VFR BLD AUTO: 34.9 % (ref 37–48.5)
HGB BLD-MCNC: 10.3 G/DL (ref 12–16)
IMM GRANULOCYTES # BLD AUTO: 0.01 K/UL (ref 0–0.04)
IMM GRANULOCYTES NFR BLD AUTO: 0.1 % (ref 0–0.5)
INFLUENZA A, MOLECULAR: NEGATIVE
INFLUENZA B, MOLECULAR: NEGATIVE
LYMPHOCYTES # BLD AUTO: 1.1 K/UL (ref 1–4.8)
LYMPHOCYTES NFR BLD: 13 % (ref 18–48)
MAGNESIUM SERPL-MCNC: 2 MG/DL (ref 1.6–2.6)
MCH RBC QN AUTO: 27.2 PG (ref 27–31)
MCHC RBC AUTO-ENTMCNC: 29.5 G/DL (ref 32–36)
MCV RBC AUTO: 92 FL (ref 82–98)
MONOCYTES # BLD AUTO: 0.6 K/UL (ref 0.3–1)
MONOCYTES NFR BLD: 7.1 % (ref 4–15)
NEUTROPHILS # BLD AUTO: 6.3 K/UL (ref 1.8–7.7)
NEUTROPHILS NFR BLD: 74.6 % (ref 38–73)
NRBC BLD-RTO: 0 /100 WBC
PLATELET # BLD AUTO: 173 K/UL (ref 150–450)
PMV BLD AUTO: 9.9 FL (ref 9.2–12.9)
POTASSIUM SERPL-SCNC: 3.8 MMOL/L (ref 3.5–5.1)
RBC # BLD AUTO: 3.79 M/UL (ref 4–5.4)
SARS-COV-2 RDRP RESP QL NAA+PROBE: NEGATIVE
SODIUM SERPL-SCNC: 140 MMOL/L (ref 136–145)
SPECIMEN SOURCE: NORMAL
WBC # BLD AUTO: 8.49 K/UL (ref 3.9–12.7)

## 2024-02-18 PROCEDURE — 25000003 PHARM REV CODE 250: Mod: HCNC | Performed by: STUDENT IN AN ORGANIZED HEALTH CARE EDUCATION/TRAINING PROGRAM

## 2024-02-18 PROCEDURE — 36415 COLL VENOUS BLD VENIPUNCTURE: CPT | Mod: HCNC | Performed by: NURSE PRACTITIONER

## 2024-02-18 PROCEDURE — 80048 BASIC METABOLIC PNL TOTAL CA: CPT | Mod: HCNC | Performed by: NURSE PRACTITIONER

## 2024-02-18 PROCEDURE — 87502 INFLUENZA DNA AMP PROBE: CPT | Mod: HCNC | Performed by: NURSE PRACTITIONER

## 2024-02-18 PROCEDURE — 27000221 HC OXYGEN, UP TO 24 HOURS: Mod: HCNC

## 2024-02-18 PROCEDURE — 25000003 PHARM REV CODE 250: Mod: HCNC | Performed by: HOSPITALIST

## 2024-02-18 PROCEDURE — U0002 COVID-19 LAB TEST NON-CDC: HCPCS | Mod: HCNC | Performed by: NURSE PRACTITIONER

## 2024-02-18 PROCEDURE — 25000242 PHARM REV CODE 250 ALT 637 W/ HCPCS: Mod: HCNC | Performed by: STUDENT IN AN ORGANIZED HEALTH CARE EDUCATION/TRAINING PROGRAM

## 2024-02-18 PROCEDURE — 11000001 HC ACUTE MED/SURG PRIVATE ROOM: Mod: HCNC

## 2024-02-18 PROCEDURE — 94761 N-INVAS EAR/PLS OXIMETRY MLT: CPT | Mod: HCNC

## 2024-02-18 PROCEDURE — 85025 COMPLETE CBC W/AUTO DIFF WBC: CPT | Mod: HCNC | Performed by: NURSE PRACTITIONER

## 2024-02-18 PROCEDURE — 87205 SMEAR GRAM STAIN: CPT | Mod: HCNC | Performed by: NURSE PRACTITIONER

## 2024-02-18 PROCEDURE — 25000003 PHARM REV CODE 250: Mod: HCNC | Performed by: NURSE PRACTITIONER

## 2024-02-18 PROCEDURE — 83735 ASSAY OF MAGNESIUM: CPT | Mod: HCNC | Performed by: NURSE PRACTITIONER

## 2024-02-18 PROCEDURE — 94640 AIRWAY INHALATION TREATMENT: CPT | Mod: HCNC

## 2024-02-18 PROCEDURE — 63600175 PHARM REV CODE 636 W HCPCS: Mod: HCNC | Performed by: STUDENT IN AN ORGANIZED HEALTH CARE EDUCATION/TRAINING PROGRAM

## 2024-02-18 PROCEDURE — 87070 CULTURE OTHR SPECIMN AEROBIC: CPT | Mod: HCNC | Performed by: NURSE PRACTITIONER

## 2024-02-18 PROCEDURE — 99900035 HC TECH TIME PER 15 MIN (STAT): Mod: HCNC

## 2024-02-18 PROCEDURE — 63600175 PHARM REV CODE 636 W HCPCS: Mod: HCNC | Performed by: NURSE PRACTITIONER

## 2024-02-18 RX ORDER — IPRATROPIUM BROMIDE AND ALBUTEROL SULFATE 2.5; .5 MG/3ML; MG/3ML
3 SOLUTION RESPIRATORY (INHALATION) EVERY 4 HOURS PRN
Status: DISCONTINUED | OUTPATIENT
Start: 2024-02-18 | End: 2024-02-19 | Stop reason: HOSPADM

## 2024-02-18 RX ADMIN — QUETIAPINE FUMARATE 200 MG: 100 TABLET ORAL at 09:02

## 2024-02-18 RX ADMIN — HYDROCODONE BITARTRATE AND ACETAMINOPHEN 1 TABLET: 10; 325 TABLET ORAL at 10:02

## 2024-02-18 RX ADMIN — Medication 2 TABLET: at 09:02

## 2024-02-18 RX ADMIN — ASPIRIN 81 MG: 81 TABLET, COATED ORAL at 09:02

## 2024-02-18 RX ADMIN — HYDROCODONE BITARTRATE AND ACETAMINOPHEN 1 TABLET: 10; 325 TABLET ORAL at 04:02

## 2024-02-18 RX ADMIN — ENOXAPARIN SODIUM 40 MG: 40 INJECTION SUBCUTANEOUS at 04:02

## 2024-02-18 RX ADMIN — HYDROXYZINE HYDROCHLORIDE 50 MG: 25 TABLET ORAL at 09:02

## 2024-02-18 RX ADMIN — TIOTROPIUM BROMIDE INHALATION SPRAY 2 PUFF: 3.12 SPRAY, METERED RESPIRATORY (INHALATION) at 09:02

## 2024-02-18 RX ADMIN — SENNOSIDES 2 TABLET: 8.6 TABLET, FILM COATED ORAL at 09:02

## 2024-02-18 RX ADMIN — FLUTICASONE PROPIONATE 100 MCG: 50 SPRAY, METERED NASAL at 09:02

## 2024-02-18 RX ADMIN — LEVOTHYROXINE SODIUM 150 MCG: 25 TABLET ORAL at 06:02

## 2024-02-18 RX ADMIN — HYDROCORTISONE 10 MG: 5 TABLET ORAL at 09:02

## 2024-02-18 RX ADMIN — DULOXETINE 60 MG: 30 CAPSULE, DELAYED RELEASE ORAL at 09:02

## 2024-02-18 RX ADMIN — TRAZODONE HYDROCHLORIDE 300 MG: 100 TABLET ORAL at 09:02

## 2024-02-18 RX ADMIN — FUROSEMIDE 20 MG: 10 INJECTION, SOLUTION INTRAMUSCULAR; INTRAVENOUS at 09:02

## 2024-02-18 RX ADMIN — OXYBUTYNIN CHLORIDE 5 MG: 5 TABLET, EXTENDED RELEASE ORAL at 09:02

## 2024-02-18 RX ADMIN — FLUDROCORTISONE ACETATE 100 MCG: 0.1 TABLET ORAL at 09:02

## 2024-02-18 RX ADMIN — AZITHROMYCIN MONOHYDRATE 500 MG: 500 INJECTION, POWDER, LYOPHILIZED, FOR SOLUTION INTRAVENOUS at 02:02

## 2024-02-18 RX ADMIN — ATORVASTATIN CALCIUM 80 MG: 40 TABLET, FILM COATED ORAL at 09:02

## 2024-02-18 NOTE — ASSESSMENT & PLAN NOTE
Pt has a SC dilaudid pump managed by pain clinic, Dr. Hillman    Pt requesting for IV dilaudid. Discussed with refrain from parenteral narcotics given the SC dilaudid pump.   Pt in agreement with low dose PRN oxycodone, tylenol for breakthrough pain    Will continue to avoid IV narcotics. However, Per pharmacy, pump change is due in 3 days which cannot be done in the hospital so once dilaudid pump is empty can get equivalent SC dilaudid.  2/18-will ask pharmacy to assess if pump is empty, if so, will manage pain with parenteral narcotics. Plan is to consult outpatient pain management to schedule an appointment to refill outpatient.

## 2024-02-18 NOTE — SUBJECTIVE & OBJECTIVE
Interval History: awaiting on placement    Review of Systems   Constitutional:  Negative for chills, fatigue and fever.   Respiratory:  Negative for cough and shortness of breath.    Cardiovascular:  Negative for chest pain, palpitations and leg swelling.   Gastrointestinal:  Negative for abdominal pain, constipation, diarrhea and nausea.   Musculoskeletal:  Positive for arthralgias. Negative for back pain.   Neurological:  Negative for dizziness and headaches.   Psychiatric/Behavioral:  Negative for agitation and confusion.      Objective:     Vital Signs (Most Recent):  Temp: 98.3 °F (36.8 °C) (02/18/24 1141)  Pulse: 68 (02/18/24 1141)  Resp: 19 (02/18/24 1141)  BP: 102/64 (02/18/24 1141)  SpO2: (!) 94 % (02/18/24 1141) Vital Signs (24h Range):  Temp:  [97.7 °F (36.5 °C)-98.6 °F (37 °C)] 98.3 °F (36.8 °C)  Pulse:  [57-68] 68  Resp:  [18-21] 19  SpO2:  [92 %-99 %] 94 %  BP: (102-143)/(53-66) 102/64     Weight: 108.9 kg (240 lb 1.3 oz)  Body mass index is 37.6 kg/m².    Intake/Output Summary (Last 24 hours) at 2/18/2024 1210  Last data filed at 2/18/2024 0516  Gross per 24 hour   Intake 1100 ml   Output 2202 ml   Net -1102 ml           Physical Exam  Vitals and nursing note reviewed.   Constitutional:       Appearance: She is ill-appearing.   HENT:      Mouth/Throat:      Mouth: Mucous membranes are moist.   Eyes:      Extraocular Movements: Extraocular movements intact.   Cardiovascular:      Rate and Rhythm: Normal rate and regular rhythm.   Pulmonary:      Effort: Pulmonary effort is normal.   Musculoskeletal:      Right lower leg: Edema present.      Left lower leg: Edema present.      Comments: BLE wound dressings in place   Skin:     General: Skin is warm and dry.   Neurological:      Mental Status: She is alert and oriented to person, place, and time.   Psychiatric:         Mood and Affect: Mood normal.         Behavior: Behavior normal.             Significant Labs: All pertinent labs within the past 24  hours have been reviewed.    Significant Imaging: I have reviewed all pertinent imaging results/findings within the past 24 hours.

## 2024-02-18 NOTE — ASSESSMENT & PLAN NOTE
Body mass index is 37.6 kg/m². Morbid obesity complicates all aspects of disease management from diagnostic modalities to treatment. Weight loss encouraged and health benefits explained to patient.

## 2024-02-18 NOTE — ASSESSMENT & PLAN NOTE
Nursing reports cough and congestion with greenish/tan/yellowish sputum.  CXR concerning for pneumonia  Already on ceftriaxone-will add azithro  FLU and COVID -

## 2024-02-18 NOTE — PLAN OF CARE
AAO x 4.  Regular diet.  Pain meds administered per MAR.  IV ABX administered per MAR.  Bilateral leg wraps intact and dry.  Turn with assistance.  SPT changed on 2/12/24 and draining to gravity.  Straw colored urine output.  Pain pump under skin at RLQ of abdomen.  COVID/Flu test performed.  Sputum collected for testing.  Safety maintained.  Encouraged to call for assistance.  Bed alarm on.

## 2024-02-18 NOTE — PROGRESS NOTES
[facility]  Telemedicine Progress Note    Patient Name: Tasha Hawley  MRN: [id number]  Patient Class: IP- Inpatient   Admission Date: 2/9/2024  Length of Stay: 8 days  Attending Physician: Rebecca Chery MD  Primary Care Provider: Mesfin Hodges II, MD          Subjective:     Principal Problem:Frequent falls        HPI:  Tasha Hawley is a 68-year-old female with past medical history of chronic lymphedema, heart failure, depression who presents due to worsening lower extremity swelling, leaking from indwelling catheter, and repeated falls per .     is at bedside and provides much of the history.  He reports that over the past few weeks, his wife has been having multiple falls due to unsteady gait partly due to her worsening lower extremity edema.  He reports despite taking the oral Lasix, patient has lower extremity edema,which has worsened with blistering occurring.  Also reports some chest pain with shortness of breath.  He wishes for possible temporary placement due to difficulty taking care of her.    He also was concerned that she has not had replacement of her indwelling suprapubic catheter.  He reports consistent leaking and wishes to get that addressed.  Patient presents for further evaluation.    Triage vitals were significant for hypoxemia.  Review of systems significant for leg swelling.  Physical exam significant for lower extremity swelling and suprapubic catheter without significant erythema in surrounding skin.    CBC significant for normocytic anemia.  CMP significant for elevated ALP.  BNP and troponin negative.  UA with bacteria and WBCs.  Urine culture pending.    CT head without contrast is negative for any acute abnormality.    Chest x-ray concerning for hiatal hernia but no obvious cardiopulmonary infectious etiology noted.    Patient admitted for falls, UA, diuresis and lower extremity wound management.      Overview/Hospital  Course:  No notes on file    Interval History: awaiting on placement    Review of Systems   Constitutional:  Negative for chills, fatigue and fever.   Respiratory:  Negative for cough and shortness of breath.    Cardiovascular:  Negative for chest pain, palpitations and leg swelling.   Gastrointestinal:  Negative for abdominal pain, constipation, diarrhea and nausea.   Musculoskeletal:  Positive for arthralgias. Negative for back pain.   Neurological:  Negative for dizziness and headaches.   Psychiatric/Behavioral:  Negative for agitation and confusion.      Objective:     Vital Signs (Most Recent):  Temp: 98.3 °F (36.8 °C) (02/18/24 1141)  Pulse: 68 (02/18/24 1141)  Resp: 19 (02/18/24 1141)  BP: 102/64 (02/18/24 1141)  SpO2: (!) 94 % (02/18/24 1141) Vital Signs (24h Range):  Temp:  [97.7 °F (36.5 °C)-98.6 °F (37 °C)] 98.3 °F (36.8 °C)  Pulse:  [57-68] 68  Resp:  [18-21] 19  SpO2:  [92 %-99 %] 94 %  BP: (102-143)/(53-66) 102/64     Weight: 108.9 kg (240 lb 1.3 oz)  Body mass index is 37.6 kg/m².    Intake/Output Summary (Last 24 hours) at 2/18/2024 1210  Last data filed at 2/18/2024 0516  Gross per 24 hour   Intake 1100 ml   Output 2202 ml   Net -1102 ml           Physical Exam  Vitals and nursing note reviewed.   Constitutional:       Appearance: She is ill-appearing.   HENT:      Mouth/Throat:      Mouth: Mucous membranes are moist.   Eyes:      Extraocular Movements: Extraocular movements intact.   Cardiovascular:      Rate and Rhythm: Normal rate and regular rhythm.   Pulmonary:      Effort: Pulmonary effort is normal.   Musculoskeletal:      Right lower leg: Edema present.      Left lower leg: Edema present.      Comments: BLE wound dressings in place   Skin:     General: Skin is warm and dry.   Neurological:      Mental Status: She is alert and oriented to person, place, and time.   Psychiatric:         Mood and Affect: Mood normal.         Behavior: Behavior normal.             Significant Labs: All pertinent  labs within the past 24 hours have been reviewed.    Significant Imaging: I have reviewed all pertinent imaging results/findings within the past 24 hours.    Assessment/Plan:      * Frequent falls  Frequent falls over the past few weeks  Has been unable to take care of the patient  Patient has a history of being reluctant to placement & typically requests discharge home in the past    Plan:  PT/OT consult - prior h/o metatarsal fractures. bilateral foot xray ordered to determine WB status - ?new non displaced fracture, ortho consult - no intervention indicated, WBAT, hard sole shoe  Case management consult to assist with placement    Chronic hypoxic respiratory failure  Patient with Hypercapnic and Hypoxic Respiratory failure which is Chronic.  she is on home oxygen at 2-3 LPM.     .       Adrenal insufficiency  Continue with the patient's home regimen    Consider increasing to stress dose while hospitalized. However pt is currently asymptomatic and HDS    Normocytic anemia  Patient's anemia is currently controlled. Has not received any PRBCs to date. Etiology likely d/t Iron deficiency  Current CBC reviewed-   Lab Results   Component Value Date    HGB 10.3 (L) 02/17/2024    HCT 34.8 (L) 02/17/2024     Monitor serial CBC and transfuse if patient becomes hemodynamically unstable, symptomatic or H/H drops below 7/21.    UTI (urinary tract infection)  Present on admission - CAUTI  UA with pyuria  Possible colonization due to suprapubic catheter but pt reports weakness which she usually has with UTIs    Continue ceftriaxone, abx X 7 days. Urine cx growing Ecoli  Urology consult for suprapubic catheter exchange. S/p catheter exchange on 2/12      Suprapubic catheter  Noted    Currently on Rocephin      Lymphedema of both lower extremities  Significant lymphedema in bilateral extremities  Blistering noted on both    IV diuresis (dose lowered due to hypotension)  Wound care      Hypothyroidism (acquired)  Unclear patient  was taking both Synthroid and Cytomel  Continue home regimen    Severe obesity (BMI 35.0-39.9) with comorbidity  Body mass index is 37.6 kg/m². Morbid obesity complicates all aspects of disease management from diagnostic modalities to treatment. Weight loss encouraged and health benefits explained to patient.         Narcotic dependency, continuous  Pt has a SC dilaudid pump managed by pain clinic, Dr. Hillman    Pt requesting for IV dilaudid. Discussed with refrain from parenteral narcotics given the SC dilaudid pump.   Pt in agreement with low dose PRN oxycodone, tylenol for breakthrough pain    Will continue to avoid IV narcotics. However, Per pharmacy, pump change is due in 3 days which cannot be done in the hospital so once dilaudid pump is empty can get equivalent SC dilaudid.  2/18-will ask pharmacy to assess if pump is empty, if so, will manage pain with parenteral narcotics. Plan is to consult outpatient pain management to schedule an appointment to refill outpatient.      Generalized anxiety disorder  Continue with patient's home medications        VTE Risk Mitigation (From admission, onward)           Ordered     enoxaparin injection 40 mg  Every 24 hours         02/11/24 0840     IP VTE HIGH RISK PATIENT  Once         02/09/24 1811     Place sequential compression device  Until discontinued         02/09/24 1811                          I have completed this tele-visit without the assistance of a telepresenter.    The attending portion of this evaluation, treatment, and documentation was performed per Edy Theodore NP via Telemedicine AudioVisual using the secure [employer] software platform with 2 way audio/video. The provider was located off-site and the patient is located in the hospital. The aforementioned video software was utilized to document the relevant history and physical exam    Edy Theodore NP  Department of Hospital Medicine   [facility]

## 2024-02-18 NOTE — PROGRESS NOTES
[facility]  Telemedicine Progress Note    Patient Name: Tasha Hawley  MRN: [id number]  Patient Class: IP- Inpatient   Admission Date: 2/9/2024  Length of Stay: 8 days  Attending Physician: Rebecca Chery MD  Primary Care Provider: Mesfin Hodges II, MD          Subjective:     Principal Problem:Frequent falls        HPI:  Tasha Hawley is a 68-year-old female with past medical history of chronic lymphedema, heart failure, depression who presents due to worsening lower extremity swelling, leaking from indwelling catheter, and repeated falls per .     is at bedside and provides much of the history.  He reports that over the past few weeks, his wife has been having multiple falls due to unsteady gait partly due to her worsening lower extremity edema.  He reports despite taking the oral Lasix, patient has lower extremity edema,which has worsened with blistering occurring.  Also reports some chest pain with shortness of breath.  He wishes for possible temporary placement due to difficulty taking care of her.    He also was concerned that she has not had replacement of her indwelling suprapubic catheter.  He reports consistent leaking and wishes to get that addressed.  Patient presents for further evaluation.    Triage vitals were significant for hypoxemia.  Review of systems significant for leg swelling.  Physical exam significant for lower extremity swelling and suprapubic catheter without significant erythema in surrounding skin.    CBC significant for normocytic anemia.  CMP significant for elevated ALP.  BNP and troponin negative.  UA with bacteria and WBCs.  Urine culture pending.    CT head without contrast is negative for any acute abnormality.    Chest x-ray concerning for hiatal hernia but no obvious cardiopulmonary infectious etiology noted.    Patient admitted for falls, UA, diuresis and lower extremity wound management.      Overview/Hospital  Course:  No notes on file    No new subjective & objective note has been filed under this hospital service since the last note was generated.      Assessment/Plan:      * Frequent falls  Frequent falls over the past few weeks  Has been unable to take care of the patient  Patient has a history of being reluctant to placement & typically requests discharge home in the past    Plan:  PT/OT consult - prior h/o metatarsal fractures. bilateral foot xray ordered to determine WB status - ?new non displaced fracture, ortho consult - no intervention indicated, WBAT, hard sole shoe  Case management consult to assist with placement    Pneumonia  Nursing reports cough and congestion with greenish/tan/yellowish sputum.  CXR concerning for pneumonia  Already on ceftriaxone-will add azithro  FLU and COVID -      Chronic hypoxic respiratory failure  Patient with Hypercapnic and Hypoxic Respiratory failure which is Chronic.  she is on home oxygen at 2-3 LPM.     .       Adrenal insufficiency  Continue with the patient's home regimen    Consider increasing to stress dose while hospitalized. However pt is currently asymptomatic and HDS    Normocytic anemia  Patient's anemia is currently controlled. Has not received any PRBCs to date. Etiology likely d/t Iron deficiency  Current CBC reviewed-   Lab Results   Component Value Date    HGB 10.3 (L) 02/17/2024    HCT 34.8 (L) 02/17/2024     Monitor serial CBC and transfuse if patient becomes hemodynamically unstable, symptomatic or H/H drops below 7/21.    UTI (urinary tract infection)  Present on admission - CAUTI  UA with pyuria  Possible colonization due to suprapubic catheter but pt reports weakness which she usually has with UTIs    Continue ceftriaxone, abx X 7 days. Urine cx growing Ecoli  Urology consult for suprapubic catheter exchange. S/p catheter exchange on 2/12      Suprapubic catheter  Noted    Currently on Rocephin      Lymphedema of both lower extremities  Significant  lymphedema in bilateral extremities  Blistering noted on both    IV diuresis (dose lowered due to hypotension)  Wound care      Hypothyroidism (acquired)  Unclear patient was taking both Synthroid and Cytomel  Continue home regimen    Severe obesity (BMI 35.0-39.9) with comorbidity  Body mass index is 37.6 kg/m². Morbid obesity complicates all aspects of disease management from diagnostic modalities to treatment. Weight loss encouraged and health benefits explained to patient.         Narcotic dependency, continuous  Pt has a SC dilaudid pump managed by pain clinic, Dr. Hillman    Pt requesting for IV dilaudid. Discussed with refrain from parenteral narcotics given the SC dilaudid pump.   Pt in agreement with low dose PRN oxycodone, tylenol for breakthrough pain    Will continue to avoid IV narcotics. However, Per pharmacy, pump change is due in 3 days which cannot be done in the hospital so once dilaudid pump is empty can get equivalent SC dilaudid.  2/18-will ask pharmacy to assess if pump is empty, if so, will manage pain with parenteral narcotics. Plan is to consult outpatient pain management to schedule an appointment to refill outpatient.      Generalized anxiety disorder  Continue with patient's home medications        VTE Risk Mitigation (From admission, onward)           Ordered     enoxaparin injection 40 mg  Every 24 hours         02/11/24 0840     IP VTE HIGH RISK PATIENT  Once         02/09/24 1811     Place sequential compression device  Until discontinued         02/09/24 1811                          I have completed this tele-visit without the assistance of a telepresenter.    The attending portion of this evaluation, treatment, and documentation was performed per Edy Theodore NP via Telemedicine AudioVisual using the secure "[employer] software platform with 2 way audio/video. The provider was located off-site and the patient is located in the hospital. The aforementioned video software was  utilized to document the relevant history and physical exam    Arehome Theodore NP  Department of Hospital Medicine   [facility]

## 2024-02-19 VITALS
TEMPERATURE: 98 F | OXYGEN SATURATION: 100 % | RESPIRATION RATE: 20 BRPM | SYSTOLIC BLOOD PRESSURE: 141 MMHG | BODY MASS INDEX: 37.23 KG/M2 | HEIGHT: 67 IN | DIASTOLIC BLOOD PRESSURE: 66 MMHG | HEART RATE: 67 BPM | WEIGHT: 237.19 LBS

## 2024-02-19 PROCEDURE — 63600175 PHARM REV CODE 636 W HCPCS: Mod: HCNC | Performed by: STUDENT IN AN ORGANIZED HEALTH CARE EDUCATION/TRAINING PROGRAM

## 2024-02-19 PROCEDURE — 25000003 PHARM REV CODE 250: Mod: HCNC | Performed by: HOSPITALIST

## 2024-02-19 PROCEDURE — 25000003 PHARM REV CODE 250: Mod: HCNC | Performed by: STUDENT IN AN ORGANIZED HEALTH CARE EDUCATION/TRAINING PROGRAM

## 2024-02-19 PROCEDURE — 94640 AIRWAY INHALATION TREATMENT: CPT | Mod: HCNC

## 2024-02-19 PROCEDURE — 25000003 PHARM REV CODE 250: Mod: HCNC | Performed by: NURSE PRACTITIONER

## 2024-02-19 PROCEDURE — 99900035 HC TECH TIME PER 15 MIN (STAT): Mod: HCNC

## 2024-02-19 PROCEDURE — 94761 N-INVAS EAR/PLS OXIMETRY MLT: CPT | Mod: HCNC

## 2024-02-19 PROCEDURE — 63600175 PHARM REV CODE 636 W HCPCS: Mod: HCNC | Performed by: NURSE PRACTITIONER

## 2024-02-19 PROCEDURE — 97530 THERAPEUTIC ACTIVITIES: CPT | Mod: HCNC

## 2024-02-19 RX ORDER — QUETIAPINE FUMARATE 100 MG/1
TABLET, FILM COATED ORAL
Qty: 180 TABLET | Refills: 3
Start: 2024-02-19 | End: 2024-04-12 | Stop reason: SDUPTHER

## 2024-02-19 RX ORDER — TRAZODONE HYDROCHLORIDE 300 MG/1
150 TABLET ORAL NIGHTLY
Qty: 90 TABLET | Refills: 3
Start: 2024-02-19 | End: 2024-04-12 | Stop reason: SDUPTHER

## 2024-02-19 RX ORDER — FERROUS SULFATE, DRIED 160(50) MG
2 TABLET, EXTENDED RELEASE ORAL 2 TIMES DAILY
Qty: 120 TABLET | Refills: 11 | Status: SHIPPED | OUTPATIENT
Start: 2024-02-19 | End: 2025-02-18

## 2024-02-19 RX ADMIN — LEVOTHYROXINE SODIUM 150 MCG: 25 TABLET ORAL at 06:02

## 2024-02-19 RX ADMIN — FUROSEMIDE 20 MG: 10 INJECTION, SOLUTION INTRAMUSCULAR; INTRAVENOUS at 09:02

## 2024-02-19 RX ADMIN — FLUTICASONE PROPIONATE 100 MCG: 50 SPRAY, METERED NASAL at 09:02

## 2024-02-19 RX ADMIN — Medication 2 TABLET: at 09:02

## 2024-02-19 RX ADMIN — ASPIRIN 81 MG: 81 TABLET, COATED ORAL at 09:02

## 2024-02-19 RX ADMIN — DULOXETINE 60 MG: 30 CAPSULE, DELAYED RELEASE ORAL at 09:02

## 2024-02-19 RX ADMIN — OXYBUTYNIN CHLORIDE 5 MG: 5 TABLET, EXTENDED RELEASE ORAL at 09:02

## 2024-02-19 RX ADMIN — HYDROCODONE BITARTRATE AND ACETAMINOPHEN 1 TABLET: 10; 325 TABLET ORAL at 09:02

## 2024-02-19 RX ADMIN — ATORVASTATIN CALCIUM 80 MG: 40 TABLET, FILM COATED ORAL at 09:02

## 2024-02-19 RX ADMIN — SENNOSIDES 2 TABLET: 8.6 TABLET, FILM COATED ORAL at 09:02

## 2024-02-19 RX ADMIN — TIOTROPIUM BROMIDE INHALATION SPRAY 2 PUFF: 3.12 SPRAY, METERED RESPIRATORY (INHALATION) at 09:02

## 2024-02-19 RX ADMIN — AZITHROMYCIN MONOHYDRATE 500 MG: 500 INJECTION, POWDER, LYOPHILIZED, FOR SOLUTION INTRAVENOUS at 02:02

## 2024-02-19 RX ADMIN — FLUDROCORTISONE ACETATE 100 MCG: 0.1 TABLET ORAL at 09:02

## 2024-02-19 RX ADMIN — ENOXAPARIN SODIUM 40 MG: 40 INJECTION SUBCUTANEOUS at 05:02

## 2024-02-19 RX ADMIN — CEFTRIAXONE 1 G: 1 INJECTION, POWDER, FOR SOLUTION INTRAMUSCULAR; INTRAVENOUS at 12:02

## 2024-02-19 NOTE — DISCHARGE SUMMARY
[facility] Medicine  Telemedicine Discharge Summary      Patient Name: Tasha Hawley  MRN: [id number]  Patient Class: IP- Inpatient  Admission Date: 2/9/2024  Hospital Length of Stay: 9 days  Discharge Date and Time: 2/19/2024  6:11 PM  Attending Physician: Rebecca Chery MD   Discharging Provider: Rebecca Chery MD  Primary Care Provider: Mesfin Hodges II, MD      HPI:   Tasha Hawley is a 68-year-old female with past medical history of chronic lymphedema, heart failure, depression who presents due to worsening lower extremity swelling, leaking from indwelling catheter, and repeated falls per .     is at bedside and provides much of the history.  He reports that over the past few weeks, his wife has been having multiple falls due to unsteady gait partly due to her worsening lower extremity edema.  He reports despite taking the oral Lasix, patient has lower extremity edema,which has worsened with blistering occurring.  Also reports some chest pain with shortness of breath.  He wishes for possible temporary placement due to difficulty taking care of her.    He also was concerned that she has not had replacement of her indwelling suprapubic catheter.  He reports consistent leaking and wishes to get that addressed.  Patient presents for further evaluation.    Triage vitals were significant for hypoxemia.  Review of systems significant for leg swelling.  Physical exam significant for lower extremity swelling and suprapubic catheter without significant erythema in surrounding skin.    CBC significant for normocytic anemia.  CMP significant for elevated ALP.  BNP and troponin negative.  UA with bacteria and WBCs.  Urine culture pending.    CT head without contrast is negative for any acute abnormality.    Chest x-ray concerning for hiatal hernia but no obvious cardiopulmonary infectious etiology noted.    Patient admitted for falls, UA, diuresis and lower extremity wound  management.      * No surgery found *      Hospital Course:   Patient with chronic lymphedema, heart failure, and depression presented for worsening lower extremity swelling, leaking from indwelling catheter, and repeated falls per . Patient suspected E.coli UTI treated with ceftriaxone. Nursing reported cough and congestion with greenish/tan/yellowish sputum. CXR concerning for pneumonia. Already on ceftriaxone; azithromycin added.  Urology was consulted to evaluate SPT. SPT was placed by Dr. Mayo. Patient reports that the SPT has not been changed in a very long time, and home health usually changes it. SPC changed 2/12/2023.  Ortho consulted for chronic and possibly acute fracture of the metatarsal next 3-5 and 5th metatarsal base respectively with no acute orthopedic intervention needed. Recommend hard-soled shoe, weightbearing as tolerated, PT/OT.    See Problems listed below for additional details of this hospital stay.      Goals of Care Treatment Preferences:  Code Status: Full Code    Health care agent: Dangelo Hawley (spouse)  Health care agent number: [phone]    Living Will: Yes          Consults:   Consults (From admission, onward)          Status Ordering Provider     Inpatient virtual consult to Hospital Medicine  Once        Provider:  (Not yet assigned)    Completed MAIDA YEAGER     Inpatient consult to Orthopedic Surgery  Once        Provider:  (Not yet assigned)    Completed NEAL BIGGS     Inpatient consult to Urology  Once        Provider:  Myles Meneses MD    Completed CHICHI DIGGS     Inpatient consult to Social Work  Once        Provider:  (Not yet assigned)    Completed MAILE BURR            Psychiatric  Narcotic dependency, continuous  Pt has a SC Dilaudid pump managed by pain clinic, Dr. Hillman    Pt requesting for IV dilaudid. Discussed with refrain from parenteral narcotics given the SC dilaudid pump.   Pt in agreement with low dose PRN  oxycodone, tylenol for breakthrough pain    Will continue to avoid IV narcotics. However, Per pharmacy, pump change is due in 3 days  Plan is to schedule an appointment to refill outpatient with Dr. Hillman    Generalized anxiety disorder  Continue with patient's home medications  Decrease Seroquel and Trazodone due to somnolence and falls  STOP benzodiazepines  Follow up with Psychiatry      Pulmonary  Pneumonia  Nursing reports cough and congestion with greenish/tan/yellowish sputum.  CXR concerning for pneumonia  Already on ceftriaxone- added azithro 500 mg x 2 days  FLU and COVID - negative    Renal/  UTI (urinary tract infection)  Present on admission - CAUTI  UA with pyuria  Possible colonization due to suprapubic catheter but pt reports weakness which she usually has with UTIs    Continue ceftriaxone, abx X 7 days. Urine cx growing Ecoli  Urology consult for suprapubic catheter exchange. S/p catheter exchange on 2/12      Suprapubic catheter  Noted  Completed Rocephin    Oncology  Normocytic anemia  Patient's anemia is currently controlled. Has not received any PRBCs to date. Etiology likely d/t Iron deficiency  Current CBC reviewed-   Lab Results   Component Value Date    HGB 10.3 (L) 02/18/2024    HCT 34.9 (L) 02/18/2024     Monitor serial CBC and transfuse if patient becomes hemodynamically unstable, symptomatic or H/H drops below 7/21.    Endocrine  Adrenal insufficiency  Continue with the patient's home regimen  Consider increasing to stress dose while hospitalized. However pt is currently asymptomatic and HDS    Hypothyroidism (acquired)  patient was taking Synthroid   Continue home regimen    Severe obesity (BMI 35.0-39.9) with comorbidity  Body mass index is 37.15 kg/m². Morbid obesity complicates all aspects of disease management from diagnostic modalities to treatment.     Other  * Frequent falls  Frequent falls over the past few weeks  Has been unable to take care of the patient  Patient has a  history of being reluctant to placement & typically requests discharge home in the past    PT/OT consult - prior h/o metatarsal fractures. bilateral foot xray ordered to determine WB status - ?new non displaced fracture, Ortho consult - no intervention indicated, WBAT, hard sole shoe  Case management consult to assist with placement but due to open claims case and Dilaudid pump, placement could not be secured.    Chronic hypoxic respiratory failure  Patient with Hypercapnic and Hypoxic Respiratory failure which is Chronic.  she is on home oxygen at 2-3 LPM.     Lymphedema of both lower extremities  Significant lymphedema in bilateral extremities  Blistering noted on both  IV diuresis (dose lowered due to hypotension) - now edema and blisters improved  Continue weekly Wound care: change calamine lite compression wraps weekly.        Final Active Diagnoses:    Diagnosis Date Noted POA    PRINCIPAL PROBLEM:  Frequent falls [R29.6] 02/01/2017 Not Applicable     Chronic    Pneumonia [J18.9] 02/18/2024 Yes    Chronic hypoxic respiratory failure [J96.11] 02/10/2024 Yes    Adrenal insufficiency [E27.40] 01/05/2024 Yes    Normocytic anemia [D64.9] 01/17/2023 Yes    UTI (urinary tract infection) [N39.0] 06/03/2021 Yes    Suprapubic catheter [Z93.59] 02/01/2018 Not Applicable     Chronic    Lymphedema of both lower extremities [I89.0] 03/02/2017 Yes    Hypothyroidism (acquired) [E03.9] 02/02/2017 Yes    Severe obesity (BMI 35.0-39.9) with comorbidity [E66.01] 11/14/2014 Yes    Narcotic dependency, continuous [F11.20] 07/18/2014 Yes     Chronic    Generalized anxiety disorder [F41.1]  Yes      Problems Resolved During this Admission:    Diagnosis Date Noted Date Resolved POA    Falls [W19.XXXA] 02/09/2024 02/09/2024 Yes       Discharged Condition: stable    Disposition: Home-Health Care [facility]    Follow Up:   Follow-up Information       Nigel Hillman MD. Go on 2/22/2024.    Specialty: Pain Medicine  Why: OFFICE TO CALL  PATIENT/FAMILY TO SET UP APPT TIME. Office aware of appt. Will visit pt to changed if in facility or home.  Contact information:  [address]  2ND FLR  [facility]  [facility] [address]  [phone]               [facility]. Call.    Why: As needed, [facility], OFFICE TO CALL PATIENT/FAMILY TO SET UP APPT TIME             Osei  [facility]. Call on 2/20/2024.    Why: As needed, HOME HEALTH, OFFICE TO CALL PATIENT/FAMILY TO SET UP APPT TIME  Contact information:  [address] [facility] [address] [phone]             [facility]. Schedule an appointment as soon as possible for a visit in 1 week(s).    Specialty: Psychiatry  Why: To establish care  Contact information:  [address] [place] [address]  [phone]                         Future Appointments   Date Time Provider Department Center   2/23/2024  8:00 AM Gracie Vega NP [address]   3/13/2024  1:00 PM Cat Cortés MD [facility]   3/14/2024 10:45 AM Enzo Car IV, MD [facility] [id number] [place] [address]       Patient Instructions:      Ambulatory referral/consult to Outpatient Case Management   Referral Priority: Urgent Referral Type: Consultation   Referral Reason: Specialty Services Required   Number of Visits Requested: 1     Ambulatory referral/consult to [facility]   Standing Status: Future   Referral Priority: Urgent Referral Type: Consultation   Referral Reason: Specialty Services Required   Number of Visits Requested: 1     Ambulatory referral/consult to Psychiatry   Standing Status: Future   Referral Priority: Routine Referral Type: Psychiatric   Referral Reason: Specialty Services Required   Requested Specialty: Psychiatry   Number of Visits Requested: 1     Diet Cardiac     Notify your health care provider if you experience any of the following:  temperature  >100.4     Notify your health care provider if you experience any of the following:  persistent nausea and vomiting or diarrhea     Notify your health care provider if you experience any of the following:  difficulty breathing or increased cough     Notify your health care provider if you experience any of the following:  severe persistent headache     Notify your health care provider if you experience any of the following:  increased confusion or weakness     Notify your health care provider if you experience any of the following:  persistent dizziness, light-headedness, or visual disturbances     Change dressing (specify)   Order Comments: Dressing change for BLE: Nursing to change calamine lite compression wraps weekly.     Activity as tolerated       Significant Diagnostic Studies:   Recent Labs   Lab 02/15/24  0507 02/17/24  0556 02/18/24  1230   WBC 4.04 6.33 8.49   HGB 10.5* 10.3* 10.3*   HCT 35.0* 34.8* 34.9*    167 173     Recent Labs   Lab 02/15/24  0507 02/17/24  0556 02/18/24  1230   GRAN 59.4  2.4 67.3  4.3 74.6*  6.3   LYMPH 24.8  1.0 20.1  1.3 13.0*  1.1   MONO 7.7  0.3 6.6  0.4 7.1  0.6   EOS 0.3 0.4 0.4     Recent Labs   Lab 02/18/24  1230      K 3.8   CL 94*   CO2 35*   BUN 12   CREATININE 0.8   *   CALCIUM 8.7   MG 2.0     Recent Labs   Lab 09/09/23  1945 09/09/23  2323 09/19/23  1230 09/21/23  1625 10/04/23  1715   PROCAL 0.03  --   --   --   --    LACTATE 1.4 1.2  --   --  0.9   FERRITIN  --   --   --  209  --    SEDRATE  --   --  73*  --   --      SARS-CoV2 (COVID-19) Qualitative PCR (no units)   Date Value   04/29/2023 Not Detected   12/12/2020 Not Detected   06/22/2020 Not Detected   06/10/2020 Not Detected   04/15/2020 Not Detected     SARS-CoV-2 RNA, Amplification, Qual (no units)   Date Value   02/18/2024 Negative   03/23/2023 Negative   01/17/2023 Negative   07/22/2022 Negative   05/12/2020 Negative     POC Rapid COVID (no units)   Date Value   09/09/2023  Negative   08/08/2023 Negative   04/28/2023 Negative   02/03/2022 Negative   06/02/2021 Negative   01/13/2021 Negative     ECG Results              EKG 12-lead (Final result)        Collection Time Result Time QRS Duration OHS QTC Calculation    02/09/24 14:20:02 02/14/24 13:22:59 106 437                     Final result by Interface, Lab In [facility] (02/14/24 13:23:01)                   Narrative:    Test Reason : R06.02,    Vent. Rate : 069 BPM     Atrial Rate : 069 BPM     P-R Int : 168 ms          QRS Dur : 106 ms      QT Int : 408 ms       P-R-T Axes : 042 -12 090 degrees     QTc Int : 437 ms    Normal sinus rhythm  Cannot rule out Anterior infarct (cited on or before 30-SEP-2023)  Abnormal ECG  When compared with ECG of 19-DEC-2023 23:47,  T wave inversion now evident in Lateral leads  Confirmed by Ketan De La O MD ([id number]) on 2/14/2024 1:22:55 PM    Referred By: [email or web address]   SELF           Confirmed By:Ketan De La O MD                                Results for orders placed during the hospital encounter of 08/21/23    Echo    Interpretation Summary    Left Ventricle: The left ventricle is normal in size. Normal wall thickness. Normal wall motion. There is normal systolic function. Ejection fraction by visual approximation is 65%. There is normal diastolic function.    Left Atrium: Left atrium is mildly dilated.    Right Ventricle: Systolic function is normal.    Pulmonary Artery: The estimated pulmonary artery systolic pressure is 51 mmHg.    IVC/SVC: Elevated venous pressure at 15 mmHg.      X-Ray Chest AP Portable  Narrative: EXAMINATION:  XR CHEST AP PORTABLE    CLINICAL HISTORY:  concern for pneumonia;    TECHNIQUE:  Single frontal view of the chest was performed.    COMPARISON:  02/09/2024    FINDINGS:  Lungs are well expanded.  Patchy airspace opacities left lung base.  Right lung is fairly clear.    Cardiac silhouette is normal in size.  Suspected hiatal hernia.  Impression: Patchy opacities left  lung base can be seen with lower airways infection or inflammation.  No lobar consolidation.    Electronically signed by: Shayla Griffith  Date:    02/18/2024  Time:    11:25       Medications:  Reconciled Home Medications:      Medication List        START taking these medications      calcium-vitamin D3 500 mg-5 mcg (200 unit) per tablet  Commonly known as: OS-JACKIE 500 + D3  Take 2 tablets by mouth 2 (two) times daily.            CHANGE how you take these medications      darifenacin 7.5 MG Tb24  Commonly known as: ENABLEX  TAKE ONE TABLET BY MOUTH EVERY DAY  What changed: when to take this     QUEtiapine 100 MG Tab  Commonly known as: SEROQUEL  TAKE 1 TABLET (100 MG) BY MOUTH NIGHTLY  What changed: additional instructions     traZODone 300 MG tablet  Commonly known as: DESYREL  Take 0.5 tablets (150 mg total) by mouth every evening.  What changed: how much to take            CONTINUE taking these medications      amitriptyline 25 MG tablet  Commonly known as: ELAVIL  Take 25 mg by mouth every evening.     aspirin 81 MG EC tablet  Commonly known as: ECOTRIN  Take 1 tablet (81 mg total) by mouth once daily.     butalbital-acetaminophen-caffeine -40 mg -40 mg per tablet  Commonly known as: FIORICET, ESGIC  Take 1 tablet by mouth daily as needed.     cyanocobalamin (vitamin B-12) 5,000 mcg Subl  Place 1 tablet under the tongue once daily.     docusate sodium 100 MG capsule  Commonly known as: COLACE  Take 200 mg by mouth every evening.     DULoxetine 60 MG capsule  Commonly known as: CYMBALTA  Take 1 capsule (60 mg total) by mouth 2 (two) times daily.     fludrocortisone 0.1 mg Tab  Commonly known as: FLORINEF  Take a half-tablet (50 mcg) by mouth once daily     fluticasone propionate 50 mcg/actuation nasal spray  Commonly known as: FLONASE  2 sprays (100 mcg total) by Each Nostril route once daily.     furosemide 40 MG tablet  Commonly known as: LASIX  Take 2 tablets (80 mg total) by mouth 2 (two) times a  day.     HYDROcodone-acetaminophen  mg per tablet  Commonly known as: NORCO  Take 1 tablet by mouth every 6 (six) hours as needed.     INTRATHECAL PAIN PUMP COMPOUND  Hydromorphone (7.5 mg/mL) infusion at 8.59 mg/day (0.3578 mg/hr) out of a total reservoir volume of 40 mL  Pump filled monthly     levothyroxine 150 MCG tablet  Commonly known as: SYNTHROID  Take 1 tablet (150 mcg total) by mouth before breakfast.     LIDOcaine 5 %  Commonly known as: LIDODERM  Place 1 patch onto the skin once daily. Remove & Discard patch within 12 hours or as directed by MD     nitroGLYCERIN 0.4 MG SL tablet  Commonly known as: NITROSTAT  Place 0.4 mg under the tongue every 5 (five) minutes as needed for Chest pain.     ondansetron 4 MG Tbdl  Commonly known as: ZOFRAN-ODT  Take 4 mg by mouth every 4 to 6 hours as needed.     pantoprazole 40 MG tablet  Commonly known as: PROTONIX  Take 1 tablet (40 mg total) by mouth once daily.     potassium chloride 10 MEQ Cpsr  Commonly known as: MICRO-K  Take 2 capsules (20 mEq total) by mouth once daily.     promethazine 25 MG tablet  Commonly known as: PHENERGAN  Take 25 mg by mouth every 6 (six) hours as needed for Nausea.     senna 8.6 mg tablet  Commonly known as: SENNA LAX  Take 2 tablets by mouth 2 (two) times daily.     traMADoL 50 mg tablet  Commonly known as: ULTRAM  Take 1 tablet (50 mg total) by mouth every 4 (four) hours as needed for Pain.     triamcinolone acetonide 0.1% 0.1 % cream  Commonly known as: KENALOG  Apply topically 2 (two) times daily.            STOP taking these medications      ciprofloxacin HCl 500 MG tablet  Commonly known as: CIPRO     diclofenac sodium 1 % Gel  Commonly known as: VOLTAREN ARTHRITIS PAIN     ketorolac 10 mg tablet  Commonly known as: TORADOL     liothyronine 5 MCG Tab  Commonly known as: CYTOMEL     nystatin powder  Commonly known as: MYCOSTATIN            ASK your doctor about these medications      atorvastatin 80 MG tablet  Commonly known  as: LIPITOR  Take 1 tablet (80 mg total) by mouth once daily.     hydrocortisone 10 MG Tab  Commonly known as: CORTEF  Take 1 tablet (10 mg total) by mouth every evening.     hydrOXYzine 50 MG tablet  Commonly known as: ATARAX  Take 1 tablet (50 mg total) by mouth every 4 (four) hours as needed for Anxiety.     SPIRIVA RESPIMAT 2.5 mcg/actuation inhaler  Generic drug: tiotropium bromide  Inhale 2 puffs into the lungs once daily. Controller            Indwelling Lines/Drains at time of discharge:   Lines/Drains/Airways       Drain  Duration                  Suprapubic Catheter 02/12/24 1500  20 Fr. 7 days         Line  Duration                  Subcutaneous Infusion Pump Abdomen (Comment) -- days        Time spent on the discharge of patient: 50 minutes  This service was provided by Virtual Visit without a Telepresenter.   Patient was seen and examined on the date of discharge.  Additional time was spent speaking with consultants and case management, reviewing records, and/or discussing the plan of care with patient/family.    The patient location: K529/K529 [id number]  Admitted 2/9/2024  3:14 PM  Patient was transferred to [facility] Medicine on:  2/14/24   This document was partially prepared by chart review and may not directly reflect my personal knowledge of the patient's case, clinical course, or significant events during the hospital stay.    The attending portion of this evaluation, treatment, and documentation was performed per Rebecca Chery MD via Telemedicine AudioVisual using the secure [employer] software platform with 2 way audio/video. The provider was located off-site and the patient is located in the hospital. The aforementioned video software was utilized to document the relevant history and physical exam    Rebecca Chery MD  Department of Hospital Medicine  [facility]

## 2024-02-19 NOTE — PT/OT/SLP PROGRESS
Physical Therapy Treatment    Patient Name:  Tasha Hawley   MRN:  [id number]    Recommendations:     Discharge Recommendations: Moderate Intensity Therapy  Discharge Equipment Recommendations: to be determined by next level of care  Barriers to discharge: None    Assessment:     Tasha Hawley is a 68 y.o. female admitted with a medical diagnosis of Frequent falls.  She presents with the following impairments/functional limitations: weakness, impaired endurance, impaired functional mobility, gait instability, impaired balance, decreased lower extremity function, pain, decreased safety awareness, impaired cardiopulmonary response to activity, orthopedic precautions. Pt remains limited by B foot pain at rest and with wbing despite Darco shoes donned at eob. Patient with decreased activity tolerance and requires seated rest break d/t JONES after 15 reps of seated hip flexion exercises. Upon arrival, pt on 3 L NC, PT increased patient to 4.5 L NC as patient reports that is what she wears at home at baseline. RN notified.     Rehab Prognosis: Good; patient would benefit from acute skilled PT services to address these deficits and reach maximum level of function.    Recent Surgery: * No surgery found *      Plan:     During this hospitalization, patient to be seen 5 x/week to address the identified rehab impairments via gait training, therapeutic activities, therapeutic exercises, neuromuscular re-education and progress toward the following goals:    Plan of Care Expires:  03/10/24    Subjective     Chief Complaint: BLE foot pain   Patient/Family Comments/goals: to walk   Pain/Comfort:  Pain Rating 1: 9/10  Location - Side 1: Bilateral  Location 1: foot  Pain Addressed 1: Nurse notified, Cessation of Activity      Objective:     Communicated with RN prior to session.  Patient found supine with oxygen (B  Darco shoes donned at eob.) upon PT entry to room.     General Precautions: Standard, fall  Orthopedic Precautions:  LLE weight bearing as tolerated, RLE weight bearing as tolerated  Braces: N/A  Respiratory Status: Nasal cannula, flow 3 L/min     Functional Mobility:  Bed Mobility:     Supine to Sit: minimum assistance  Sit to Supine: stand by assistance  Transfers:     Bed to Chair: moderate assistance and of 1 persons with  hand-held assist  using  Stand Pivot  Gait: decreased lateral weight shifting and RLE foot clearance noted when taking steps to chair. Poor eccentric control noted, Cues and assist for lateral weight shift.   Balance: Good static and dynamic sitting balance noted. Fair postural control noted in static standing, poor dynamic standing balance noted without AD.       AM-PAC 6 CLICK MOBILITY  Turning over in bed (including adjusting bedclothes, sheets and blankets)?: 4  Sitting down on and standing up from a chair with arms (e.g., wheelchair, bedside commode, etc.): 2  Moving from lying on back to sitting on the side of the bed?: 3  Moving to and from a bed to a chair (including a wheelchair)?: 3  Need to walk in hospital room?: 2  Climbing 3-5 steps with a railing?: 1  Basic Mobility Total Score: 15       Treatment & Education:  Pt performed x 15 reps of B ankle pumps,   X 30 reps of seated hip flexion with seated rest break after x 20 reps d/t JONES. Required assist for full L hip flexion AROM d/t weakness.   X 15 reps bilaterally on LAQs with cues for full AROM.   Pt sat eob x 8 minutes performing the above exercises.   Educated on PLB and for symptom management.   See above for details on cues for sequencing and education during transfer to bedside chair.   Patient left up in chair with all lines intact, call button in reach, and RN notified..    GOALS:   Multidisciplinary Problems       Physical Therapy Goals          Problem: Physical Therapy    Goal Priority Disciplines Outcome Goal Variances Interventions   Physical Therapy Goal     PT, PT/OT Ongoing, Progressing     Description: Goals to be met by:  3/10/2024    Patient will increase functional independence with mobility by performin. Bed mobility with Mod independence  2. Supine<>sit with Mod assist.    Revised Goals to be met by discharge date:  1.  Bed mobility with Mod Ind  2.  Txfs sup<>sit with SBA  3.  Txfs sit<>stand with SBA w RW use  4.  Pt to amb with BLE WBAT and B Darco shoes ~15' with CGA with RW without LOB during change in direction or straight path  5.  Pt to tolerate OOB chair > 1 hr                               Time Tracking:     PT Received On: 24  PT Start Time: 1055     PT Stop Time: 1107  PT Total Time (min): 12 min     Billable Minutes: Therapeutic Activity 12    Treatment Type: Treatment  PT/PTA: PTA     Number of PTA visits since last PT visit: 0     2024

## 2024-02-19 NOTE — PLAN OF CARE
The sw spoke to [phone] who states she's waiting for [employer] to send her the info. She states she spoke to the 's office who states they don't have any documentation or records on this pt's case b/c it's been so long and they're only required to keep the info for so long. She was referred to another  to seek assistance. She called but was informed the   and she has to call the firm he worked for to see if they have any records. The sw explained to her the pt will not be able to get into any nh without proof the case is closed. Lisa states she reached out to her step-father to see if he can find any documentation around the house the pt may have received. Lisa states she was informed she may not be able to get the info she's looking for b/c the case is so old. The sw spoke to Maria [phone] at [facility] who states she can't accept the pt with the pain pump. She states the pt's pump will have to get filled,she has to get more info surrounding the pump. The pt looks like a good candidate but need questions answered surrounding the pump. The sw is exhausting all resources surrounding this case.     8:56am The sw spoke to Zoë [phone] at [email or web address] who states she's unsure of  reason the pt was denied. She will speak with her  and find out. She will call this sw back with the determination. The sw did inform her the pt's dtr is working on getting the letter from [employer] to show the pt's case is old and closed.      24 0824   Post-Acute Status   Post-Acute Authorization Placement   Post-Acute Placement Status Pending payor review/awaiting authorization (if required)   Discharge Delays (!) Post-Acute Set-up   Discharge Plan   Discharge Plan A Skilled Nursing Facility   Discharge Plan B Home Health

## 2024-02-19 NOTE — PLAN OF CARE
02/19/24 1432   Post-Acute Status   Post-Acute Authorization Placement   Post-Acute Placement Status Pending payor review/awaiting authorization (if required)  (Spoke with daughter. Accepting facilities noted and contigent on letter from [employer] for closed claims. Info commincated to daughter who has been very responsive and also working to get the required info for placement. Agreeable to [facility].)   Discharge Delays (!) Post-Acute Set-up   Discharge Plan   Discharge Plan A Skilled Nursing Facility   Discharge Plan B Home;Home with family;Home Health  ([facility], Outpt CM, HH SN/PT/OT.)     Spoke with [facility]. Will get auth submitted today for SNF. Communicated daughter's email of [email or web address] to send intake forms electronically today. All contingent on daughter securing a letter of closure for open MSP which she is expecting from [employer] by [email or web address] am as they have to get off of microfilm and upload electronically to send. Daughter is also reaching out to [employer] and [employer] about other claims communicated. Daughter had also made contact with [employer].    1500 pm - Spoke with supervisor about case. Pt has been in SNF in the last 60 days and will not meet criteria for 60 day period of wellness. Daughter reports that only has been in SNF over a year and has not been to facility in the last 60 days.     1520 pm -  Spoke with Zoë garcia about case and administration is now declining acceptance of patient at this time. CM has exhausted options for placement. Pt was also later denied by [facility] as pain pump cannot be changed sooner than 2/22 at home. Communicated to daughter and patient. Attending updated. HH updated for SOC of 2/20 for nursing admit. [facility]FC for transport home via ambulance with O2 at 3L per NC.  [facility] program referral alerted to DC at home. Updated pain management clinic to DC to home.

## 2024-02-19 NOTE — ASSESSMENT & PLAN NOTE
Frequent falls over the past few weeks  Has been unable to take care of the patient  Patient has a history of being reluctant to placement & typically requests discharge home in the past    PT/OT consult - prior h/o metatarsal fractures. bilateral foot xray ordered to determine WB status - ?new non displaced fracture, Ortho consult - no intervention indicated, WBAT, hard sole shoe  Case management consult to assist with placement but due to open claims case and Dilaudid pump, placement could not be secured.

## 2024-02-19 NOTE — ASSESSMENT & PLAN NOTE
Body mass index is 37.15 kg/m². Morbid obesity complicates all aspects of disease management from diagnostic modalities to treatment.

## 2024-02-19 NOTE — ASSESSMENT & PLAN NOTE
Patient's anemia is currently controlled. Has not received any PRBCs to date. Etiology likely d/t Iron deficiency  Current CBC reviewed-   Lab Results   Component Value Date    HGB 10.3 (L) 02/18/2024    HCT 34.9 (L) 02/18/2024     Monitor serial CBC and transfuse if patient becomes hemodynamically unstable, symptomatic or H/H drops below 7/21.

## 2024-02-19 NOTE — ASSESSMENT & PLAN NOTE
Pt has a SC Dilaudid pump managed by pain clinic, Dr. Hillman    Pt requesting for IV dilaudid. Discussed with refrain from parenteral narcotics given the SC dilaudid pump.   Pt in agreement with low dose PRN oxycodone, tylenol for breakthrough pain    Will continue to avoid IV narcotics. However, Per pharmacy, pump change is due in 3 days  Plan is to schedule an appointment to refill outpatient with Dr. Hillman

## 2024-02-19 NOTE — PLAN OF CARE
Resting in bed no acute distress noted respirations even and unlabored call light within reach bed in low position bed alarm in place.

## 2024-02-19 NOTE — ASSESSMENT & PLAN NOTE
Significant lymphedema in bilateral extremities  Blistering noted on both  IV diuresis (dose lowered due to hypotension) - now edema and blisters improved  Continue weekly Wound care: change calamine lite compression wraps weekly.

## 2024-02-19 NOTE — ASSESSMENT & PLAN NOTE
Nursing reports cough and congestion with greenish/tan/yellowish sputum.  CXR concerning for pneumonia  Already on ceftriaxone- added azithro 500 mg x 2 days  FLU and COVID - negative

## 2024-02-19 NOTE — PROGRESS NOTES
Compression calamine dressings to BLE changed as directed.  Blisters healed. Will continue with compression wraps weekly. Notified Dr. Chery.

## 2024-02-19 NOTE — PLAN OF CARE
[facility]      HOME HEALTH ORDERS  FACE TO FACE ENCOUNTER    Patient Name: Tasha Hawley  YOB: 1955    PCP: Mesfin Hodges II, MD   PCP Address: [address] / [place] [address]  PCP Phone Number: [phone]  PCP Fax: [phone]    Encounter Date: 2/9/24    Admit to Home Health    Diagnoses:  Active Hospital Problems    Diagnosis  POA    *Frequent falls [R29.6]  Not Applicable     Chronic    Pneumonia [J18.9]  Yes    Chronic hypoxic respiratory failure [J96.11]  Yes    Adrenal insufficiency [E27.40]  Yes    Normocytic anemia [D64.9]  Yes    UTI (urinary tract infection) [N39.0]  Yes    Suprapubic catheter [Z93.59]  Not Applicable     Chronic    Lymphedema of both lower extremities [I89.0]  Yes    Hypothyroidism (acquired) [E03.9]  Yes    Severe obesity (BMI 35.0-39.9) with comorbidity [E66.01]  Yes    Narcotic dependency, continuous [F11.20]  Yes     Chronic    Generalized anxiety disorder [F41.1]  Yes      Resolved Hospital Problems    Diagnosis Date Resolved POA    Falls [W19.XXXA] 02/09/2024 Yes       Follow Up Appointments:  Future Appointments   Date Time Provider Department Center   3/13/2024  1:00 PM Cat Cortés MD [facility]   3/14/2024 10:45 AM Enzo Car IV, MD [id number] [place] [facility]      Follow-up Information       Nigel Hillman MD. Go on 2/22/2024.    Specialty: Pain Medicine  Why: OFFICE TO CALL PATIENT/FAMILY TO SET UP APPT TIME. Office aware of appt. Will visit pt to changed if in facility or home.  Contact information:  David BLANCA  2ND FLR  [facility]  [facility] [place] [address]  [phone]               [facility]. Call.    Why: As needed, [facility], OFFICE TO CALL PATIENT/FAMILY TO SET UP APPT TIME             [facility]. Call on 2/20/2024.    Why: As needed, HOME HEALTH, OFFICE TO CALL PATIENT/FAMILY TO SET UP APPT  TIME  Contact information:  [address] [facility] [address] [phone]             [facility] PSYCHIATRY. Schedule an appointment as soon as possible for a visit in 1 week(s).    Specialty: Psychiatry  Why: To establish care  Contact information:  [address] [place] [address] [phone]                         Allergies:  Review of patient's allergies indicates:   Allergen Reactions    (d)-limonene flavor      Other reaction(s): difficult intubation  Other reaction(s): Difficulty breathing    Benadryl [diphenhydramine hcl] Shortness Of Breath    Fentanyl Itching, Nausea And Vomiting and Swelling             Imitrex [sumatriptan succinate] Shortness Of Breath    Oxycodone-acetaminophen Shortness Of Breath    Sulfamethoxazole-trimethoprim Anaphylaxis    Topiramate Shortness Of Breath    Vancomycin Anaphylaxis     Rash    Butorphanol tartrate     Evening primrose (oenothera biennis)     Latex     Pregabalin Other (See Comments)     tremors    Propoxyphene n-acetaminophen      Other reaction(s): Difficulty breathing    Sumatriptan     White petrolatum-zinc     Zinc acetate     Zinc oxide-white petrolatum      Other reaction(s): Difficulty breathing    Latex, natural rubber Itching and Rash    Phenytoin Rash and Other (See Comments)     Trouble breathing       Medications: Review discharge medications with patient and family and provide education.    Current Facility-Administered Medications   Medication Dose Route Frequency Provider Last Rate Last Admin    acetaminophen tablet 650 mg  650 mg Oral Q8H PRN Bharti Sullivan MD   650 mg at 02/16/24 1940    albuterol-ipratropium 2.5 mg-0.5 mg/3 mL nebulizer solution 3 mL  3 mL Nebulization Q4H PRN Edy Theodore, SHONDA        aspirin EC tablet 81 mg  81 mg Oral Daily Barber Allen MD   81 mg at 02/19/24 0923    atorvastatin tablet 80 mg  80 mg Oral Daily Barber Allen MD   80 mg at 02/19/24 0923     azithromycin (ZITHROMAX) 500 mg in dextrose 5 % (D5W) 250 mL IVPB (Vial-Mate)  500 mg Intravenous Q24H Edy Theodore NP   Stopped at 02/19/24 1530    calcium-vitamin D3 500 mg-5 mcg (200 unit) per tablet 2 tablet  2 tablet Oral BID Bharti Sullivan MD   2 tablet at 02/19/24 0923    cefTRIAXone (Rocephin) 1 g in dextrose 5 % in water (D5W) 100 mL IVPB (MB+)  1 g Intravenous Q24H Barber Allen MD   Stopped at 02/19/24 0043    dextrose 10% bolus 125 mL 125 mL  12.5 g Intravenous PRN Barber Allen MD        dextrose 10% bolus 250 mL 250 mL  25 g Intravenous PRN Barber Allen MD        DULoxetine DR capsule 60 mg  60 mg Oral BID Barber Allen MD   60 mg at 02/19/24 0923    enoxaparin injection 40 mg  40 mg Subcutaneous Q24H (prophylaxis, 1700) Bharti Sullivan MD   40 mg at 02/18/24 1651    fludrocortisone tablet 100 mcg  100 mcg Oral Daily Barber Allen MD   100 mcg at 02/19/24 0923    fluticasone propionate 50 mcg/actuation nasal spray 100 mcg  2 spray Each Nostril Daily Barber Allen MD   100 mcg at 02/19/24 0924    furosemide injection 20 mg  20 mg Intravenous Q12H Bharti Sullivan MD   20 mg at 02/19/24 0924    glucagon (human recombinant) injection 1 mg  1 mg Intramuscular PRN Barber Allen MD        glucose chewable tablet 16 g  16 g Oral PRN Barber Allen MD        glucose chewable tablet 24 g  24 g Oral PRN Barber Allen MD        HYDROcodone-acetaminophen  mg per tablet 1 tablet  1 tablet Oral Q6H PRN Matt Duarte MD   1 tablet at 02/19/24 0931    hydrocortisone tablet 10 mg  10 mg Oral QHS Barber Allen MD   10 mg at 02/18/24 5549    Hydromorphone 7.5mg/ml IT pain pump 40mL   Intrathecal Continuous Nafisa, Zaida AU MD   New Bag at 02/12/24 1709    hydrOXYzine HCL tablet 50 mg  50 mg Oral Q4H PRN Barber Allen MD   50 mg at 02/18/24 2157    levothyroxine tablet 150 mcg  150 mcg Oral Before breakfast  Barber Allen MD   150 mcg at 02/19/24 0650    melatonin tablet 6 mg  6 mg Oral Nightly PRN Barber Allen MD        naloxone 0.4 mg/mL injection 0.02 mg  0.02 mg Intravenous PRN Barber Allen MD        ondansetron injection 4 mg  4 mg Intravenous Q8H PRN Barber Allen MD        oxybutynin 24 hr tablet 5 mg  5 mg Oral Daily Barber Allen MD   5 mg at 02/19/24 0923    polyethylene glycol packet 17 g  17 g Oral Daily Barber Allen MD   17 g at 02/13/24 0907    QUEtiapine tablet 200 mg  200 mg Oral QHS Barber Allen MD   200 mg at 02/18/24 2149    senna tablet 2 tablet  2 tablet Oral BID Barber Allen MD   2 tablet at 02/19/24 0923    simethicone chewable tablet 80 mg  1 tablet Oral QID PRN Barber Allen MD        sodium chloride 0.9% flush 10 mL  10 mL Intravenous Q12H PRN Barber Allen MD        tiotropium bromide 2.5 mcg/actuation inhaler 2 puff  2 puff Inhalation Daily Barber Allen MD   2 puff at 02/19/24 0918    traZODone tablet 300 mg  300 mg Oral QHS Barber Aleln MD   300 mg at 02/18/24 2149     Current Discharge Medication List        START taking these medications    Details   calcium-vitamin D3 (OS-JACKIE 500 + D3) 500 mg-5 mcg (200 unit) per tablet Take 2 tablets by mouth 2 (two) times daily.  Qty: 120 tablet, Refills: 11           CONTINUE these medications which have CHANGED    Details   QUEtiapine (SEROQUEL) 100 MG Tab TAKE 1 TABLET (100 MG) BY MOUTH NIGHTLY  Qty: 180 tablet, Refills: 3    Associated Diagnoses: Insomnia due to medical condition      traZODone (DESYREL) 300 MG tablet Take 0.5 tablets (150 mg total) by mouth every evening.  Qty: 90 tablet, Refills: 3    Associated Diagnoses: Insomnia due to medical condition           CONTINUE these medications which have NOT CHANGED    Details   amitriptyline (ELAVIL) 25 MG tablet Take 25 mg by mouth every evening.      butalbital-acetaminophen-caffeine -40  mg (FIORICET, ESGIC) -40 mg per tablet Take 1 tablet by mouth daily as needed.      darifenacin (ENABLEX) 7.5 MG Tb24 TAKE ONE TABLET BY MOUTH EVERY DAY  Qty: 30 tablet, Refills: 11      DULoxetine (CYMBALTA) 60 MG capsule Take 1 capsule (60 mg total) by mouth 2 (two) times daily.  Qty: 60 capsule, Refills: 11    Associated Diagnoses: Intractable pain; Chronic pain of both feet      fludrocortisone (FLORINEF) 0.1 mg Tab Take a half-tablet (50 mcg) by mouth once daily  Qty: 15 tablet, Refills: 5      furosemide (LASIX) 40 MG tablet Take 2 tablets (80 mg total) by mouth 2 (two) times a day.  Qty: 360 tablet, Refills: 3      HYDROcodone-acetaminophen (NORCO)  mg per tablet Take 1 tablet by mouth every 6 (six) hours as needed.  Qty: 30 tablet, Refills: 0    Comments: Quantity prescribed more than 7 day supply? No  Associated Diagnoses: Closed multiple fractures of right foot with delayed healing, subsequent encounter      levothyroxine (SYNTHROID) 150 MCG tablet Take 1 tablet (150 mcg total) by mouth before breakfast.  Qty: 90 tablet, Refills: 3    Associated Diagnoses: Hypothyroidism (acquired)      LIDOcaine (LIDODERM) 5 % Place 1 patch onto the skin once daily. Remove & Discard patch within 12 hours or as directed by MD  Refills: 0      pantoprazole (PROTONIX) 40 MG tablet Take 1 tablet (40 mg total) by mouth once daily.  Qty: 90 tablet, Refills: 3    Associated Diagnoses: Gastroesophageal reflux disease, unspecified whether esophagitis present      triamcinolone acetonide 0.1% (KENALOG) 0.1 % cream Apply topically 2 (two) times daily.  Qty: 80 g, Refills: 11    Associated Diagnoses: Rash      aspirin (ECOTRIN) 81 MG EC tablet Take 1 tablet (81 mg total) by mouth once daily.  Qty: 90 tablet, Refills: 3    Associated Diagnoses: Chronic pain syndrome      atorvastatin (LIPITOR) 80 MG tablet Take 1 tablet (80 mg total) by mouth once daily.  Qty: 90 tablet, Refills: 3    Associated Diagnoses: Mixed  hyperlipidemia      cyanocobalamin, vitamin B-12, 5,000 mcg Subl Place 1 tablet under the tongue once daily.      docusate sodium (COLACE) 100 MG capsule Take 200 mg by mouth every evening.      fluticasone propionate (FLONASE) 50 mcg/actuation nasal spray 2 sprays (100 mcg total) by Each Nostril route once daily.  Qty: 16 g, Refills: 0      hydrocortisone (CORTEF) 10 MG Tab Take 1 tablet (10 mg total) by mouth every evening.  Qty: 30 tablet, Refills: 5      hydrOXYzine (ATARAX) 50 MG tablet Take 1 tablet (50 mg total) by mouth every 4 (four) hours as needed for Anxiety.  Qty: 30 tablet, Refills: 0      intrathecal pain pump compound Hydromorphone (7.5 mg/mL) infusion at 8.59 mg/day (0.3578 mg/hr) out of a total reservoir volume of 40 mL  Pump filled monthly      nitroGLYCERIN (NITROSTAT) 0.4 MG SL tablet Place 0.4 mg under the tongue every 5 (five) minutes as needed for Chest pain.      ondansetron (ZOFRAN-ODT) 4 MG TbDL Take 4 mg by mouth every 4 to 6 hours as needed.      potassium chloride (MICRO-K) 10 MEQ CpSR Take 2 capsules (20 mEq total) by mouth once daily.  Qty: 60 capsule, Refills: 11      promethazine (PHENERGAN) 25 MG tablet Take 25 mg by mouth every 6 (six) hours as needed for Nausea.      senna (SENNA LAX) 8.6 mg tablet Take 2 tablets by mouth 2 (two) times daily.      tiotropium bromide (SPIRIVA RESPIMAT) 2.5 mcg/actuation inhaler Inhale 2 puffs into the lungs once daily. Controller  Qty: 4 g, Refills: 1      traMADoL (ULTRAM) 50 mg tablet Take 1 tablet (50 mg total) by mouth every 4 (four) hours as needed for Pain.  Qty: 30 tablet, Refills: 0    Comments: Quantity prescribed more than 7 day supply? No  Associated Diagnoses: Pain in right foot           STOP taking these medications       ciprofloxacin HCl (CIPRO) 500 MG tablet Comments:   Reason for Stopping:         diclofenac sodium (VOLTAREN ARTHRITIS PAIN) 1 % Gel Comments:   Reason for Stopping:         ketorolac (TORADOL) 10 mg tablet  Comments:   Reason for Stopping:         liothyronine (CYTOMEL) 5 MCG Tab Comments:   Reason for Stopping:         nystatin (MYCOSTATIN) powder Comments:   Reason for Stopping:                 I have seen and examined this patient within the last 30 days. My clinical findings that support the need for the home health skilled services and home bound status are the following:no   Weakness/numbness causing balance and gait disturbance due to Weakness/Debility and lymphedema making it taxing to leave home.  Requiring assistive device to leave home due to unsteady gait caused by  Weakness/Debility.     Diet:   2 gram sodium diet    Labs:  Report Lab results to PCP.    Referrals/ Consults  Physical Therapy to evaluate and treat. Evaluate for home safety and equipment needs; Establish/upgrade home exercise program. Perform / instruct on therapeutic exercises, gait training, transfer training, and Range of Motion.  Occupational Therapy to evaluate and treat. Evaluate home environment for safety and equipment needs. Perform/Instruct on transfers, ADL training, ROM, and therapeutic exercises.   to evaluate for community resources/long-range planning.  WCON consult    Activities:   activity as tolerated and ambulate in house with assistance    Nursing:   Agency to admit patient within 24 hours of hospital discharge unless specified on physician order or at patient request    SN to complete comprehensive assessment including routine vital signs. Instruct on disease process and s/s of complications to report to MD. Review/verify medication list sent home with the patient at time of discharge  and instruct patient/caregiver as needed. Frequency may be adjusted depending on start of care date.     Skilled nurse to perform up to 3 visits PRN for symptoms related to diagnosis    Notify MD if SBP > 160 or < 90; DBP > 90 or < 50; HR > 120 or < 50; Temp > 101; O2 < 88%    Ok to schedule additional visits based on staff  availability and patient request on consecutive days within the home health episode.    When multiple disciplines ordered:    Start of Care occurs on Sunday - Wednesday schedule remaining discipline evaluations as ordered on separate consecutive days following the start of care.    Thursday SOC -schedule subsequent evaluations Friday and Monday the following week.     Friday - Saturday SOC - schedule subsequent discipline evaluations on consecutive days starting Monday of the following week.    For all post-discharge communication and subsequent orders please contact patient's primary care physician. If unable to reach primary care physician or do not receive response within 30 minutes, please contact [facility] for clinical staff order clarification    Miscellaneous   SPC Care: Instruct patient/caregiver to empty SPC bag.  Change every month. SPC site care per protocol  Routine Skin for Bedridden Patients: Instruct patient/caregiver to apply moisture barrier cream to all skin folds and wet areas in perineal area daily and after baths and all bowel movements.  Home Oxygen:  No change    Home Health Aide:  Nursing Twice weekly, Physical Therapy Three times weekly, Occupational Therapy Three times weekly, and Medical Social Work Twice weekly    Wound Care Orders  BLE: Nursing to change calamine lite compression wraps weekly.    I certify that this patient is confined to her home and needs intermittent skilled nursing care, physical therapy, and occupational therapy.      Rebecca Chery MD

## 2024-02-19 NOTE — PLAN OF CARE
02/19/24 0859   Post-Acute Status   Post-Acute Authorization Placement;Medications   Post-Acute Placement Status Pending post-acute provider review/more information requested  (Pt has multiple denials from SNF. IPR is reviewing. Pain pump has been addressed for appt on 2/22. Daughter is aware of exhaustive efforts for placement. Auth for IPR has requested to be placed today. Medical status update requested by daughter. MD [place].)   Medication Status Set-up complete/Auth obtained  (CM spoke with Pt's pain clinic with Dr. Chavez's office. They have an appt for her on 2/22. They will fill the pain pump in home or facility if needed. Attending notified.)   Discharge Delays Orders Needed   Discharge Plan   Discharge Plan A Rehab   Discharge Plan B Home;Home with family;Home Health  ([facility]. Outpt Case Management.)        Follow-up Information       Nigel Hillman MD. Go on 2/22/2024.    Specialty: Pain Medicine  Why: OFFICE TO CALL PATIENT/FAMILY TO SET UP APPT TIME. Office aware of appt. Will visit pt to changed if in facility or home.  Contact information:  [address]  2ND FLR  [facility]  [facility] [address]  [phone]                               Recipient Location First Sent On Respond By Date and Time Provider Can Take Patient First Response Received Last Response Received Last Response     [facility] Phone: [phone]    24 Hours Fever Free  * COVID-19: Positive patients under care,* COVID-19: Willing/Equipped to accept COVID-19 positive patients,* High-Risk Isolation Patients: Willing/Equipped to accept High-Risk Isolation Patients [address] [place] [address] 2/14/2024 11:08 (CT)  2/14/2024 13:06 (CT)  -  2/14/2024 11:11 (CT)  2/14/2024 11:11 (CT)  Response: Interested, but need more information  Comments: PLEASE UPLOAD REFERRAL FILE FOR REVIEW  Waiting for you     [facility] [email or web address] Phone: [phone]     covid test required  * COVID-19: Willing/Equipped to accept COVID-19 positive patients [address] [place] [address] 2/14/2024 11:08 (CT)  2/14/2024 13:06 (CT)  -  2/14/2024 11:11 (CT)  2/14/2024 11:11 (CT)  Response: Interested, but need more information  Comments: PLEASE UPLOAD REFERRAL FILE FOR REVIEW  Waiting for you     [facility] Phone: [phone]    1 negative within 24 hrs of admit covid test  * COVID-19: Willing/Equipped to accept COVID-19 positive patients,* High-Risk Isolation Patients: Willing/Equipped to accept High-Risk Isolation Patients [address] [place] [address] 2/14/2024 11:08 (CT)  2/14/2024 13:06 (CT)  -  2/14/2024 11:11 (CT)  2/14/2024 11:11 (CT)  Response: Interested, but need more information  Comments: PLEASE UPLOAD REFERRAL FILE FOR REVIEW  Waiting for you     [facility] Phone: [phone]      * COVID-19: Willing/Equipped to accept COVID-19 positive patients,* High-Risk Isolation Patients: Willing/Equipped to accept High-Risk Isolation Patients [address] [place] [address] 2/14/2024 11:08 (CT)  2/14/2024 13:06 (CT)  -  2/14/2024 11:11 (CT)  2/14/2024 11:11 (CT)  Response: Interested, but need more information  Comments: PLEASE UPLOAD REFERRAL FILE FOR REVIEW  Waiting for you     [facility] Phone: [phone]      * COVID-19: NOT able to accept COVID-19 positive patients [address] [place] [address] 2/14/2024 11:08 (CT)  2/14/2024 13:06 (CT)  -  2/14/2024 11:10 (CT)  2/14/2024 11:17 (CT)  Response: No, unable to accept patient  Reason: Care Needs Exceed Current Capacity  Comments: Denied- Wounds Max  Waiting for you     [facility] Phone: [phone]    negative covid within 24 hours of discharge  * COVID-19: NOT able to accept COVID-19 positive patients [address] [place] [address] [place] [address] 2/14/2024 11:08 (CT)  2/14/2024 13:06 (CT)  -  2/14/2024 12:04 (CT)   2/14/2024 12:04 (CT)  Response: No, unable to accept patient  Reason: Other (see comments)  Comments: duplicate  Waiting for you     [facility] Phone: [phone]    negative covid test  * COVID-19: Willing/Equipped to accept COVID-19 positive patients,* High-Risk Isolation Patients: Willing/Equipped to accept High-Risk Isolation Patients [address] [facility] [place] [address] 2/14/2024 11:08 (CT)  2/14/2024 13:06 (CT)  -  2/14/2024 11:14 (CT)  2/14/2024 11:14 (CT)  Response: No, unable to accept patient  Reason: No Available Bed  Waiting for [facility] Phone: [phone]      * COVID-19: Positive patients under care,* COVID-19: Willing/Equipped to accept COVID-19 positive patients,* High-Risk Isolation Patients: Willing/Equipped to accept High-Risk Isolation Patients [address] [place] [address] 2/14/2024 11:08 (CT)  2/14/2024 13:06 (CT)  -  2/16/2024 11:41 (CT)  2/16/2024 11:41 (CT)  Response: No, unable to accept patient  Reason: Care Needs Exceed Current Capacity  Waiting for you     [facility] Phone: [phone]      * COVID-19: NOT able to accept COVID-19 positive patients [address] [place] [address] 2/14/2024 11:08 (CT)  2/14/2024 13:06 (CT)  -  2/14/2024 11:48 (CT)  2/14/2024 11:48 (CT)  Response: No, unable to accept patient  Reason: Care Needs Exceed Current Capacity  Waiting for you     [facility] Phone: [phone]    1 negative test within 72 hours prior to admission  * COVID-19: NOT able to accept COVID-19 positive patients [address] [facility] [address] [place] [address] 2/14/2024 11:08 (CT)  2/14/2024 13:06 (CT)  -  2/14/2024 14:01 (CT)  2/14/2024 14:01 (CT)  Response: No, unable to accept patient  Reason: Other (see comments)  Comments: already went to snf within the 60 days  Waiting for you     [facility]  Phone: [phone]      * COVID-19: NOT able to accept COVID-19 positive patients [address] [place] [address] 2/14/2024 11:08 (CT)  2/14/2024 13:06 (CT)  -  2/14/2024 13:18 (CT)  2/14/2024 13:18 (CT)  Response: No, unable to accept patient  Reason: No Available Bed  Waiting for you     [facility] Phone: [phone]    No signs or symptoms for 10 days. Please do not re-test.  * COVID-19: Willing/Equipped to accept COVID-19 positive patients [address] [facility]. [place] [address] 2/14/2024 11:08 (CT)  2/14/2024 13:06 (CT)  -  2/14/2024 11:10 (CT)  2/14/2024 11:16 (CT)  Response: No, unable to accept patient  Reason: Care Needs Exceed Current Capacity  Comments: Denied- Wound Care Max  Waiting for you [facility] Phone: [phone]    1 negative test in last 72 hours  * COVID-19: NOT able to accept COVID-19 positive patients [address] [place] [address] 2/14/2024 11:08 (CT)  2/14/2024 13:06 (CT)  -  2/14/2024 12:36 (CT)  2/14/2024 12:36 (CT)  Response: No, unable to accept patient  Reason: Other (see comments)  Comments: See previous denial   Waiting for you     [facility] Phone: [phone]    Last COVID Test result  * COVID-19: Willing/Equipped to accept COVID-19 positive patients,* High-Risk Isolation Patients: Willing/Equipped to accept High-Risk Isolation Patients [address] [place] [address] 2/14/2024 11:08 (CT)  2/14/2024 13:09 (CT)  -  2/14/2024 11:37 (CT)  2/15/2024 8:52 (CT)  Response: No, unable to accept patient  Reason: Out of Network  Comments: PT. HAS AN [facility] ONLY [employer] PLAN. SHE CAN ONLY GO TO AN [facility] SKILLED FACILITY.  Waiting for you     [facility] Phone: [phone]    all patients must have an up-to-date Covid swab test result closest to d/c, we will take our patients who are positive back.  * COVID-19: Services not specified,* High-Risk Isolation  Patients: Willing/Equipped to accept High-Risk Isolation Patients [address] [place] [address] 2/14/2024 11:08 (CT)  2/14/2024 13:06 (CT)  -  2/14/2024 11:19 (CT)  2/14/2024 15:20 (CT)  Response: No, unable to accept patient  Reason: Patient Not Eligible  Waiting for you     [facility] yarelis [place] Phone: [phone]    yes  * COVID-19: Willing/Equipped to accept COVID-19 positive patients,* High-Risk Isolation Patients: Willing/Equipped to accept High-Risk Isolation Patients [address] [place] [address] 2/14/2024 11:08 (CT)  2/14/2024 13:06 (CT)  -  2/15/2024 12:05 (CT)  2/15/2024 12:05 (CT)  Response: No, unable to accept patient  Reason: Other (see comments)  Comments: Cannot meet needs. Thanks  Waiting for you     [facility] Phone: [phone]    covid test within 72 hours  * COVID-19: NOT able to accept COVID-19 positive patients,* High-Risk Isolation Patients: Willing/Equipped to accept High-Risk Isolation Patients [address] [place] [address] 2/14/2024 11:08 (CT)  2/14/2024 13:06 (CT)  -  2/14/2024 11:48 (CT)  2/14/2024 11:48 (CT)  Response: No, unable to accept patient  Reason: Care Needs Exceed Current Capacity  Waiting for you     [facility] Phone: [phone]    2 negative test prior to entry  * COVID-19: NOT able to accept COVID-19 positive patients [address] [place] [address] 2/14/2024 11:08 (CT)  2/14/2024 13:06 (CT)  -  2/14/2024 11:48 (CT)  2/14/2024 11:48 (CT)  Response: No, unable to accept patient  Reason: Care Needs Exceed Current Capacity  Waiting for you     [facility] Phone: [phone]    negative covid test 48hours prior to admissions.  * COVID-19: NOT able to accept COVID-19 positive patients,* High-Risk Isolation Patients: Willing/Equipped to accept High-Risk Isolation Patients [address] [facility] [place] [address] 2/14/2024 11:08 (CT)  2/14/2024 13:06 (CT)  -  2/14/2024 12:46 (CT)  2/14/2024 13:28 (CT)   Response: No, unable to accept patient  Reason: Out of Network  Waiting for you     [facility] Phone: [phone]    2 covid neg results, 1 72 hrs prior to actual dc  * COVID-19: Services not specified,* High-Risk Isolation Patients: Willing/Equipped to accept High-Risk Isolation Patients [address] [place] [address] 2/14/2024 11:08 (CT)  2/14/2024 13:06 (CT)  -  2/14/2024 11:26 (CT)  2/14/2024 11:26 (CT)  Response: Referral Received  Waiting for recipient     [facility] Phone: [phone]    RAPID TEST DAY OF DC.  * COVID-19: Positive patients under care,* COVID-19: Willing/Equipped to accept COVID-19 positive patients [address] [facility] [place] [address] 2/14/2024 11:08 (CT)  2/14/2024 13:06 (CT)  -  2/14/2024 11:10 (CT)  2/14/2024 11:10 (CT)  Response: Referral Received  Waiting for recipient     [facility] Phone: [phone]    negative Covid test prior to admission  * COVID-19: Willing/Equipped to accept COVID-19 positive patients,* High-Risk Isolation Patients: Willing/Equipped to accept High-Risk Isolation Patients [address] [place] [address] 2/14/2024 11:08 (CT)  2/14/2024 13:06 (CT)  -  -  -  -  Waiting for recipient     [facility] Phone: [phone]      * High-Risk Isolation Patients: Willing/Equipped to accept High-Risk Isolation Patients [address] [place] [address] -  -  -  -  -  -       [facility] Phone: [phone]   [address] [place] [address] -  -  -  -  -  -       [facility] Phone: [phone]      * COVID-19: Positive patients under care,* COVID-19: Services not specified,* High-Risk Isolation Patients: Willing/Equipped to accept High-Risk Isolation Patients [address] [facility] [address] [place] [address] 2/14/2024 11:08 (CT)  2/14/2024 13:06 (CT)  -  -  -  -  Waiting for recipient      [facility] Phone: [phone]      * COVID-19: NOT able to accept COVID-19 positive patients [address] [place] [address] 2/14/2024 11:08 (CT)  2/14/2024 13:06 (CT)  -  -  -  -  Waiting for recipient     [facility] Luz Maria Phone: [phone]      * COVID-19: NOT able to accept COVID-19 positive patients [address] [place] [address] 2/14/2024 11:08 (CT)  2/14/2024 13:06 (CT)  -  -  -  -  Waiting for recipient     [facility] Phone: [phone]      * COVID-19: Willing/Equipped to accept COVID-19 positive patients,* High-Risk Isolation Patients: Willing/Equipped to accept High-Risk Isolation Patients [address] [place] [address] 2/14/2024 11:08 (CT)  2/14/2024 13:06 (CT)  -  -  -  -  Waiting for recipient    Response Notes   [facility] Phone: [phone]    2 negative Covid Tests  * COVID-19: Willing/Equipped to accept COVID-19 positive patients,* High-Risk Isolation Patients: Willing/Equipped to accept High-Risk Isolation Patients [address] [place] [address] 2/14/2024 11:08 (CT)  2/14/2024 13:06 (CT)  -  -  -  -  Waiting for recipient     [facility] Phone: [phone]    NONE  * COVID-19: NOT able to accept COVID-19 positive patients,* COVID-19: Positive patients under care [address] [place] [address] 2/14/2024 11:08 (CT)  2/14/2024 13:06 (CT)  -  -  -  -  Waiting for recipient     [facility] Phone: [phone]      * COVID-19: NOT able to accept COVID-19 positive patients [address] [facility] [place] [address] 2/14/2024 11:08 (CT)  2/14/2024 13:06 (CT)  -  -  -  -  Waiting for recipient    Response Notes

## 2024-02-19 NOTE — PLAN OF CARE
[facility] - Med Surg  Discharge Final Note    Primary Care Provider: Mesfin Hodges II, MD    Expected Discharge Date: 2/20/2024    Pharmacist will go over home medications and reasons for medications. VN and bedside nurse to reiterate final discharge instructions.     Cleared from CM . Bedside Nurse and VN notified.    Pt to DC to home with HH after exhaustive efforts for placement. DC plan as listed below in CM flowsheet.    [facility]  alerted to DC today and for urgent visit to be scheduled.    Scheduled  Date/Time  Last updated: 02/19/24 1603  2/19/2024 5:30 PM     1618 pm - HH orders noted and sent to [facility] via [employer]. Agency alerted.    Final Discharge Note (most recent)       Final Note - 02/19/24 1557          Final Note    Assessment Type Final Discharge Note (P)      Anticipated Discharge Disposition Home-Health Care Svc (P)      Hospital Resources/Appts/Education Provided Appointments scheduled and added to AVS (P)         Post-Acute Status    Post-Acute Authorization Placement;Home Health (P)      Post-Acute Placement Status Discharge Plan Changed (P)      Home Health Status Set-up Complete/Auth obtained (P)    Accepted by [facility] RVP for SOC 2/20 with RN admit. Awaiting HH orders.    Medication Status Set-up complete/Auth obtained (P)    Pt has F/U appt with Pain clinic for 2/22 for nurse visit for pump medication refill. Clinic alerted to DC to home.    Discharge Delays PFC Arranged Transportation (P)    Stretcher transport requested with oxygen at transport. Awaiting scheduled  time                  Future Appointments   Date Time Provider Department Center   3/13/2024  1:00 PM Cat Cortés MD [facility]   3/14/2024 10:45 AM Enzo Car IV, MD [facility] [id number]       Contact Info       Nigel Hillman MD   Specialty: Pain Medicine    [address] [facility]  2ND FLR  [facility]  [facility] [address]   Phone: [phone]        Next Steps: Go on 2/22/2024    Instructions: OFFICE TO CALL PATIENT/FAMILY TO SET UP APPT TIME. Office aware of appt. Will visit pt to changed if in facility or home.    [facility]        Next Steps: Call    Instructions: As needed, NURSE PRACTIONER AT HOME PROGRAM, OFFICE TO CALL PATIENT/FAMILY TO SET UP APPT TIME    [facility] [address] [address]   Phone: [phone]       Next Steps: Call on 2/20/2024    Instructions: As needed, HOME HEALTH, OFFICE TO CALL PATIENT/FAMILY TO SET UP APPT TIME          Orders Placed This Encounter   Procedures    Ambulatory referral/consult to Outpatient Case Management     Referral Priority:   Urgent     Referral Type:   Consultation     Referral Reason:   Specialty Services Required     Number of Visits Requested:   1    Ambulatory referral/consult to [facility]     Standing Status:   Future     Standing Expiration Date:   3/10/2025     Referral Priority:   Urgent     Referral Type:   Consultation     Referral Reason:   Specialty Services Required     Number of Visits Requested:   1

## 2024-02-19 NOTE — PLAN OF CARE
The sw spoke to Rigoberto [phone] at [facility] and she states she can't keep the pt in pending b/c she needs the spot. They had the pt as discharging today and to bee seen tomorrow. She will have to give the spot to another pt b/c they can't keep her in the pending status. She states this pt has been a regular with them for years and requested the sw call them when the pt's ready for d/c so they can add her to the board again.       02/19/24 1347   Post-Acute Status   Post-Acute Authorization Home Health   Home Health Status Referrals Sent   Discharge Delays (!) Post-Acute Set-up   Discharge Plan   Discharge Plan A Skilled Nursing Facility   Discharge Plan B Home Health

## 2024-02-19 NOTE — ASSESSMENT & PLAN NOTE
Patient with Hypercapnic and Hypoxic Respiratory failure which is Chronic.  she is on home oxygen at 2-3 LPM.

## 2024-02-19 NOTE — ASSESSMENT & PLAN NOTE
Continue with patient's home medications  Decrease Seroquel and Trazodone due to somnolence and falls  STOP benzodiazepines  Follow up with Psychiatry

## 2024-02-21 ENCOUNTER — PATIENT MESSAGE (OUTPATIENT)
Dept: ADMINISTRATIVE | Facility: CLINIC | Age: 69
End: 2024-02-21
Payer: MEDICARE

## 2024-02-21 ENCOUNTER — PATIENT OUTREACH (OUTPATIENT)
Dept: ADMINISTRATIVE | Facility: CLINIC | Age: 69
End: 2024-02-21
Payer: MEDICARE

## 2024-02-21 LAB
BACTERIA SPEC AEROBE CULT: NORMAL
BACTERIA SPEC AEROBE CULT: NORMAL
GRAM STN SPEC: NORMAL

## 2024-02-22 NOTE — PROGRESS NOTES
C3 nurse attempted to contact Tasha Hawley for a TCC post hospital discharge follow up call. No answer. VM full. The patient does not have a scheduled HOSFU appointment. Message sent to PCP staff for assistance with scheduling visit with patient.   
  C3 nurse attempted to contact Tasha Hawley for a TCC post hospital discharge follow up call. The patient is unable to conduct the call @ this time. The patient requested a callback.    The patient has a scheduled [facility] appointment with Gracie Vega on 02/23/2024 @ 0800.   
  C3 nurse spoke with Tasha Hawley for a TCC post hospital discharge follow up call. The patient has a scheduled [facility] appointment with Gracie Vega on 02/23/2024 @ 0800.       
no

## 2024-02-26 ENCOUNTER — TELEPHONE (OUTPATIENT)
Dept: INTERNAL MEDICINE | Facility: CLINIC | Age: 69
End: 2024-02-26
Payer: MEDICARE

## 2024-02-26 NOTE — TELEPHONE ENCOUNTER
----- Message from Karla Barboza sent at 2/26/2024  8:51 AM CST -----  Contact: [phone]  Patient called, requested a courtesy call from Dr Hodges's nurse - did not specify. Thank you

## 2024-02-28 ENCOUNTER — PATIENT MESSAGE (OUTPATIENT)
Dept: INTERNAL MEDICINE | Facility: CLINIC | Age: 69
End: 2024-02-28
Payer: MEDICARE

## 2024-02-29 ENCOUNTER — TELEPHONE (OUTPATIENT)
Dept: INTERNAL MEDICINE | Facility: CLINIC | Age: 69
End: 2024-02-29
Payer: MEDICARE

## 2024-02-29 DIAGNOSIS — J96.12 CHRONIC RESPIRATORY FAILURE WITH HYPOXIA AND HYPERCAPNIA: Primary | ICD-10-CM

## 2024-02-29 DIAGNOSIS — J96.11 CHRONIC RESPIRATORY FAILURE WITH HYPOXIA AND HYPERCAPNIA: Primary | ICD-10-CM

## 2024-02-29 NOTE — TELEPHONE ENCOUNTER
----- Message from Charla Clark sent at 2/28/2024  4:06 PM CST -----  Contact: [phone]  1MEDICALADVICE     Patient is calling for Medical Advice regarding: needs a small portable oxygen      How long has patient had these symptoms:    Pharmacy name and phone#:    Would like response via [employer]:  call back     Comments:    Please call pt back

## 2024-03-01 ENCOUNTER — HOSPITAL ENCOUNTER (EMERGENCY)
Facility: HOSPITAL | Age: 69
Discharge: HOME | End: 2024-03-01
Attending: EMERGENCY MEDICINE
Payer: MEDICARE

## 2024-03-01 VITALS
DIASTOLIC BLOOD PRESSURE: 68 MMHG | WEIGHT: 239 LBS | HEIGHT: 67 IN | SYSTOLIC BLOOD PRESSURE: 121 MMHG | TEMPERATURE: 98 F | BODY MASS INDEX: 37.51 KG/M2 | OXYGEN SATURATION: 95 % | RESPIRATION RATE: 19 BRPM | HEART RATE: 84 BPM

## 2024-03-01 DIAGNOSIS — N39.0 URINARY TRACT INFECTION WITH HEMATURIA, SITE UNSPECIFIED: Primary | ICD-10-CM

## 2024-03-01 DIAGNOSIS — S99.919A ANKLE INJURY: ICD-10-CM

## 2024-03-01 DIAGNOSIS — R31.9 URINARY TRACT INFECTION WITH HEMATURIA, SITE UNSPECIFIED: Primary | ICD-10-CM

## 2024-03-01 DIAGNOSIS — S99.929A FOOT INJURY: ICD-10-CM

## 2024-03-01 DIAGNOSIS — L30.9 DERMATITIS: ICD-10-CM

## 2024-03-01 LAB
BACTERIA #/AREA URNS HPF: ABNORMAL /HPF
BILIRUB UR QL STRIP: NEGATIVE
CLARITY UR: ABNORMAL
COLOR UR: YELLOW
GLUCOSE UR QL STRIP: NEGATIVE
HGB UR QL STRIP: ABNORMAL
KETONES UR QL STRIP: NEGATIVE
LEUKOCYTE ESTERASE UR QL STRIP: ABNORMAL
MICROSCOPIC COMMENT: ABNORMAL
NITRITE UR QL STRIP: NEGATIVE
PH UR STRIP: 7 [PH] (ref 5–8)
PROT UR QL STRIP: NEGATIVE
RBC #/AREA URNS HPF: 14 /HPF (ref 0–4)
SP GR UR STRIP: 1.01 (ref 1–1.03)
SQUAMOUS #/AREA URNS HPF: 0 /HPF
URN SPEC COLLECT METH UR: ABNORMAL
UROBILINOGEN UR STRIP-ACNC: NEGATIVE EU/DL
WBC #/AREA URNS HPF: 74 /HPF (ref 0–5)
WBC CLUMPS URNS QL MICRO: ABNORMAL
YEAST URNS QL MICRO: ABNORMAL

## 2024-03-01 PROCEDURE — 87086 URINE CULTURE/COLONY COUNT: CPT | Mod: HCNC | Performed by: EMERGENCY MEDICINE

## 2024-03-01 PROCEDURE — 25000003 PHARM REV CODE 250: Mod: HCNC | Performed by: EMERGENCY MEDICINE

## 2024-03-01 PROCEDURE — 99284 EMERGENCY DEPT VISIT MOD MDM: CPT | Mod: 25

## 2024-03-01 PROCEDURE — 81000 URINALYSIS NONAUTO W/SCOPE: CPT | Mod: HCNC | Performed by: EMERGENCY MEDICINE

## 2024-03-01 RX ORDER — TRIAMCINOLONE ACETONIDE 1 MG/G
CREAM TOPICAL 2 TIMES DAILY
Qty: 45 G | Refills: 0 | Status: ON HOLD | OUTPATIENT
Start: 2024-03-01 | End: 2024-05-22

## 2024-03-01 RX ORDER — CIPROFLOXACIN 500 MG/1
500 TABLET ORAL
Status: COMPLETED | OUTPATIENT
Start: 2024-03-01 | End: 2024-03-01

## 2024-03-01 RX ORDER — CIPROFLOXACIN 500 MG/1
500 TABLET ORAL 2 TIMES DAILY
Qty: 14 TABLET | Refills: 0 | Status: SHIPPED | OUTPATIENT
Start: 2024-03-01 | End: 2024-03-11

## 2024-03-01 RX ADMIN — CIPROFLOXACIN 500 MG: 500 TABLET, FILM COATED ORAL at 11:03

## 2024-03-02 NOTE — ED PROVIDER NOTES
Encounter Date: 3/1/2024       History     Chief Complaint   Patient presents with    Multiple Medical Complaints     Patient reports left foot pain after twisting, generalized rash and burning with urination that started after recent admission. Patient has suprapubic cath.      Patient presents with pain to the left foot after falling out of her wheelchair.  She is also complaining of a pruritic rash to her bilateral upper extremities and back area.  She is history of suprapubic catheter in his complaining of dysuria.    The history is provided by the patient and the spouse.     Review of patient's allergies indicates:   Allergen Reactions    (d)-limonene flavor      Other reaction(s): difficult intubation  Other reaction(s): Difficulty breathing    Benadryl [diphenhydramine hcl] Shortness Of Breath    Fentanyl Itching, Nausea And Vomiting and Swelling             Imitrex [sumatriptan succinate] Shortness Of Breath    Oxycodone-acetaminophen Shortness Of Breath    Sulfamethoxazole-trimethoprim Anaphylaxis    Topiramate Shortness Of Breath    Vancomycin Anaphylaxis     Rash    Butorphanol tartrate     Evening primrose (oenothera biennis)     Latex     Pregabalin Other (See Comments)     tremors    Propoxyphene n-acetaminophen      Other reaction(s): Difficulty breathing    Sumatriptan     White petrolatum-zinc     Zinc acetate     Zinc oxide-white petrolatum      Other reaction(s): Difficulty breathing    Latex, natural rubber Itching and Rash    Phenytoin Rash and Other (See Comments)     Trouble breathing     Past Medical History:   Diagnosis Date    Anxiety     Arthritis     Bilateral lower extremity edema     severe chronic    Carotid artery occlusion     Cataract     CHF (congestive heart failure)     Coronary artery disease     subtotalled LAD with collateral    Depression     Fever blister     Hard of hearing     Hypokalemia 01/09/2023    Hyponatremia 02/04/2022    Hypothyroid     Iron deficiency anemia      Lumbar radiculopathy     with chronic pain    Ocular migraine     Other emphysema 05/22/2023    Renal disorder     Sleep apnea     cpap     Past Surgical History:   Procedure Laterality Date    ADENOIDECTOMY      BACK SURGERY      x 12    CARDIAC CATHETERIZATION  2016    subtotalled LAD with right to left collaterals    CATARACT EXTRACTION W/  INTRAOCULAR LENS IMPLANT Left     Dr Coleman     CYSTOSCOPIC LITHOLAPAXY N/A 6/27/2019    Procedure: CYSTOLITHOLAPAXY;  Surgeon: Shireen Mayo MD;  Location: [facility] OR 2ND FLR;  Service: Urology;  Laterality: N/A;    CYSTOSCOPIC LITHOLAPAXY N/A 9/3/2019    Procedure: CYSTOLITHOLAPAXY;  Surgeon: Shireen Mayo MD;  Location: [facility] OR 2ND FLR;  Service: Urology;  Laterality: N/A;    CYSTOSCOPY N/A 7/13/2021    Procedure: CYSTOSCOPY;  Surgeon: Shireen Mayo MD;  Location: [facility] OR 1ST FLR;  Service: Urology;  Laterality: N/A;    CYSTOSCOPY  11/16/2021    Procedure: CYSTOSCOPY;  Surgeon: Shireen Mayo MD;  Location: [facility] OR 1ST FLR;  Service: Urology;;    CYSTOSCOPY  7/19/2022    Procedure: CYSTOSCOPY;  Surgeon: Shireen Mayo MD;  Location: [facility] OR 1ST FLR;  Service: Urology;;    CYSTOSCOPY WITH INJECTION OF PERIURETHRAL BULKING AGENT  7/19/2022    Procedure: CYSTOSCOPY, WITH PERIURETHRAL BULKING AGENT INJECTION-MACROPLASTIQUE;  Surgeon: Shireen Mayo MD;  Location: [facility] OR 1ST FLR;  Service: Urology;;    CYSTOSCOPY WITH INJECTION OF PERIURETHRAL BULKING AGENT N/A 3/28/2023    Procedure: CYSTOSCOPY, WITH PERIURETHRAL BULKING AGENT INJECTION;  Surgeon: Shireen Mayo MD;  Location: [facility] OR 1ST FLR;  Service: Urology;  Laterality: N/A;  [place]    CYSTOSCOPY WITH INJECTION OF PERIURETHRAL BULKING AGENT N/A 11/14/2023    Procedure: CYSTOSCOPY, WITH PERIURETHRAL BULKING AGENT INJECTION;  Surgeon: Shireen Mayo MD;  Location: [facility] OR 1ST FLR;  Service: Urology;  Laterality: N/A;    CYSTOSCOPY,WITH BOTULINUM TOXIN INJECTION N/A 12/13/2022    Procedure:  CYSTOSCOPY,WITH BOTULINUM TOXIN INJECTION;  Surgeon: Shireen Mayo MD;  Location: [facility] OR 1ST FLR;  Service: Urology;  Laterality: N/A;  300 U    CYSTOSCOPY,WITH BOTULINUM TOXIN INJECTION N/A 3/28/2023    Procedure: CYSTOSCOPY,WITH BOTULINUM TOXIN INJECTION;  Surgeon: Shireen Mayo MD;  Location: [facility] OR 1ST FLR;  Service: Urology;  Laterality: N/A;  45 MIN.    300 UNITS    ESOPHAGOGASTRODUODENOSCOPY N/A 5/23/2018    Procedure: ESOPHAGOGASTRODUODENOSCOPY (EGD);  Surgeon: Prince Vance MD;  Location: [facility] ENDO (4TH FLR);  Service: Endoscopy;  Laterality: N/A;  r/s 'd per Dr. Vance due to family emergency- ER    ESOPHAGOGASTRODUODENOSCOPY N/A 6/23/2023    Procedure: EGD (ESOPHAGOGASTRODUODENOSCOPY);  Surgeon: Juaquin Barry MD;  Location: [facility] ENDO (2ND FLR);  Service: Endoscopy;  Laterality: N/A;  Refferal: PRINCE VANCE  Order  tele enc 5/18/23  dx: history of a Nissen fundoplication  Additional Scheduling Information:  On home oxygen 2nd floor for airway protection also with a pain pump elevated BMI close to 40   Prep Gastroparesis   ins    HYSTERECTOMY  1975    endometriosis    INJECTION OF BOTULINUM TOXIN TYPE A  7/13/2021    Procedure: INJECTION, BOTULINUM TOXIN, 200units;  Surgeon: Shireen Mayo MD;  [facility];  Service: Urology;;    INJECTION OF BOTULINUM TOXIN TYPE A  11/16/2021    Procedure: INJECTION, BOTULINUM TOXIN, 200units;  Surgeon: Shireen Mayo MD;  [facility];  Service: Urology;;    INJECTION OF BOTULINUM TOXIN TYPE A  7/19/2022    Procedure: INJECTION, BOTULINUM TOXIN, 300 units ;  Surgeon: Shireen Mayo MD;  [facility];  Service: Urology;;    INJECTION OF BOTULINUM TOXIN TYPE A N/A 11/14/2023    Procedure: INJECTION, BOTULINUM TOXIN, TYPE A;  Surgeon: Shireen Mayo MD;  [facility];  Service: Urology;  Laterality: N/A;    INSERTION, SUPRAPUBIC CATHETER N/A 12/13/2022    Procedure: INSERTION,  SUPRAPUBIC CATHETER;  Surgeon: Shireen Mayo MD;  [facility] 1ST F[facility];  Service: Urology;  Laterality: N/A;  exchange    INSERTION, SUPRAPUBIC CATHETER N/A 11/14/2023    Procedure: INSERTION, SUPRAPUBIC CATHETER;  Surgeon: Shireen Mayo MD;  Location: [facility] OR 1ST FLR;  Service: Urology;  Laterality: N/A;  EXCHANGE of s/p tube    pain pump placement      SQ Dilaudid Pump managed by Dr. Hillman, Pain Management    REMOVAL OF BONE SPUR OF FOOT Bilateral 9/16/2022    Procedure: EXCISION ARTHRITIC BONE, BILATERAL FOOT;  Surgeon: NICOLA Mcgrath;  Location: [facility] OR;  Service: Podiatry;  Laterality: Bilateral;    REPLACEMENT OF BACLOFEN PUMP N/A 8/2/2023    Procedure: REPLACEMENT, BACLOFEN PUMP;  Surgeon: Smitha Condon MD;  Location: [facility] OR 2ND FLR;  Service: Neurosurgery;  Laterality: N/A;  Anes: Gen  Blood: Type & Screen  Pos: Left Lat  Intrathecal Pump  Bed: Reg Reversed  Rad: C-arm  Pump: 40 cc, [employer]tronics    REPLACEMENT OF CATHETER N/A 10/31/2019    Procedure: REPLACEMENT, CATHETER-SUPRAPUBIC;  Surgeon: Shireen Mayo MD;  Location: [facility] OR 1ST FLR;  Service: Urology;  Laterality: N/A;    SPINAL CORD STIMULATOR REMOVAL      before [place]    SPINE SURGERY  5-13-13    CERVICAL FUSION    TONSILLECTOMY       Family History   Problem Relation Age of Onset    Cancer Mother 55        breast    Cancer Father         esophagus,had laryngectomy    Esophageal cancer Father     Parkinsonism Maternal Grandmother     Tremor Maternal Grandmother     No Known Problems Brother     No Known Problems Brother     Heart disease Maternal Uncle     Colon cancer Maternal Uncle         Less than 60    No Known Problems Sister     No Known Problems Maternal Aunt     Cirrhosis Paternal Aunt         ETOH    Liver disease Paternal Aunt         ETOH    Liver disease Paternal Uncle         ETOH    Cirrhosis Paternal Uncle         ETOH    No Known Problems Maternal Grandfather     No Known Problems Paternal  "Grandmother     No Known Problems Paternal Grandfather     Melanoma Neg Hx     Amblyopia Neg Hx     Blindness Neg Hx     Cataracts Neg Hx     Diabetes Neg Hx     Glaucoma Neg Hx     Hypertension Neg Hx     Macular degeneration Neg Hx     Retinal detachment Neg Hx     Strabismus Neg Hx     Stroke Neg Hx     Thyroid disease Neg Hx     Celiac disease Neg Hx     Colon polyps Neg Hx     Cystic fibrosis Neg Hx     Crohn's disease Neg Hx     Inflammatory bowel disease Neg Hx     Liver cancer Neg Hx     Rectal cancer Neg Hx     Stomach cancer Neg Hx     Ulcerative colitis Neg Hx     Lymphoma Neg Hx      Social History     Tobacco Use    Smoking status: Never    Smokeless tobacco: Never   Substance Use Topics    Alcohol use: Never    Drug use: Yes     Types: Marijuana     Comment: "medical marijuana gummies"     Review of Systems   Genitourinary:  Positive for dysuria.   Musculoskeletal:  Positive for arthralgias.       Physical Exam     Initial Vitals [03/01/24 1939]   BP Pulse Resp Temp SpO2   (!) 123/58 81 19 97.7 °F (36.5 °C) 99 %      MAP       --         Physical Exam    Vitals reviewed.  Constitutional:   The patient is drowsy,  will wake up easily to voice and answer questions   Cardiovascular:  Normal rate and regular rhythm.           No murmur heard.  Pulmonary/Chest: Breath sounds normal. She has no wheezes.   Abdominal: Abdomen is soft. She exhibits no distension. There is no abdominal tenderness.   Musculoskeletal:      Comments: Chronic lymphedema no obvious deformities, pulses intact     Neurological: She has normal strength. No sensory deficit.   Patient drowsy   Skin:   Areas of dermatitis noted to the upper extremities and back with no signs of infection         ED Course   Procedures  Labs Reviewed   URINALYSIS, REFLEX TO URINE CULTURE - Abnormal; Notable for the following components:       Result Value    Appearance, UA Hazy (*)     Occult Blood UA 2+ (*)     Leukocytes, UA 3+ (*)     All other " components within normal limits    Narrative:     Specimen Source->Urine   URINALYSIS MICROSCOPIC - Abnormal; Notable for the following components:    RBC, UA 14 (*)     WBC, UA 74 (*)     WBC Clumps, UA Few (*)     Bacteria Many (*)     Yeast, UA Rare (*)     All other components within normal limits    Narrative:     Specimen Source->Urine   CULTURE, URINE          Imaging Results              X-Ray Foot Complete Left (Final result)  Result time 03/01/24 22:20:17      Final result by Josh Hernadez MD (03/01/24 22:20:17)                   Impression:      Chronic degenerative changes in the hind and midfoot.  Correlate for neuropathic joint.    No discrete acute fracture or foreign body.      Electronically signed by: Josh Hernadez  Date:    03/01/2024  Time:    22:20               Narrative:    EXAMINATION:  XR FOOT COMPLETE 3 VIEW LEFT    CLINICAL HISTORY:  .  Unspecified injury of unspecified foot, initial encounter    TECHNIQUE:  AP, lateral and oblique views of the left foot were performed.    COMPARISON:  01/04/2012    FINDINGS:  Chronic changes in the foot are obscured by overlying bandage.  No discrete fracture.  No subcutaneous gas or foreign body.                                       X-Ray Ankle Complete Left (Final result)  Result time 03/01/24 22:35:03      Final result by Josh Hernadez MD (03/01/24 22:35:03)                   Impression:      Chronic changes in the left ankle and hind foot with no acute findings through bandage.      Electronically signed by: Josh Hernadez  Date:    03/01/2024  Time:    22:35               Narrative:    EXAMINATION:  XR ANKLE COMPLETE 3 VIEW LEFT    CLINICAL HISTORY:  Unspecified injury of unspecified ankle, initial encounter    TECHNIQUE:  AP, lateral and oblique views of the left ankle were performed.    COMPARISON:  None    FINDINGS:  Bandage projects over the ankle.  Degenerative changes in the mid and hindfoot.  No fracture or dislocation.  No  discrete foreign body                                       Medications   ciprofloxacin HCl tablet 500 mg (500 mg Oral Given 3/1/24 9602)     Medical Decision Making  Patient's vital signs are stable.   states that the patient gets around in a wheelchair and falls asleep in her wheelchair causing her to fall out of it.  I communicated with the  that I feel that she is on too much medication.  He states that her doctors are working on weaning her off.  I instructed that he needs to continue this.  He states that he has been putting some of his steroid cream on her and it has been helping therefore I will provide a prescription for this.  I will provide Cipro for her urinary tract infection and she has good urology follow-up.  Instructed  inpatient to return immediately for any new or worsening symptoms and they both verbalized understanding.    Amount and/or Complexity of Data Reviewed  Labs:  Decision-making details documented in ED Course.  Radiology: ordered. Decision-making details documented in ED Course.    Risk  Prescription drug management.  Risk Details: Differential diagnosis includes but is not limited to:  UTI, fracture, dislocation, sprain, strain               ED Course as of 03/02/24 0517   Fri Mar 01, 2024   2242 X-Ray Ankle Complete Left  Chronic changes in the left ankle and hind foot with no acute findings through bandage. [CD]   2243 X-Ray Foot Complete Left  Chronic degenerative changes in the hind and midfoot.  Correlate for neuropathic joint.     No discrete acute fracture or foreign body.   [CD]   2243 Urinalysis, Reflex to Urine Culture Urine, Catheterized(!)  uti [CD]      ED Course User Index  [CD] Andrey Witt DO                           Clinical Impression:  Final diagnoses:  [S99.929A] Foot injury  [S99.919A] Ankle injury  [N39.0, R31.9] Urinary tract infection with hematuria, site unspecified (Primary)  [L30.9] Dermatitis          ED Disposition Condition     Discharge Stable          ED Prescriptions       Medication Sig Dispense Start Date End Date Auth. Provider    triamcinolone acetonide 0.1% (KENALOG) 0.1 % cream Apply topically 2 (two) times daily. 45 g 3/1/2024 -- Andrey Witt DO    ciprofloxacin HCl (CIPRO) 500 MG tablet Take 1 tablet (500 mg total) by mouth 2 (two) times daily. for 7 days 14 tablet 3/1/2024 3/8/2024 Andrey Witt DO          Follow-up Information       Follow up With Specialties Details Why Contact Info    Mesfin Hodges II, MD Internal Medicine Schedule an appointment as soon as possible for a visit   [address]  [place] [address]  [phone]      Shireen Mayo MD Urology Schedule an appointment as soon as possible for a visit   [address]  [place] [address]  [phone]               Andrey Witt DO  03/02/24 0519

## 2024-03-03 LAB — BACTERIA UR CULT: NO GROWTH

## 2024-03-04 ENCOUNTER — PATIENT OUTREACH (OUTPATIENT)
Dept: EMERGENCY MEDICINE | Facility: HOSPITAL | Age: 69
End: 2024-03-04
Payer: MEDICARE

## 2024-03-04 NOTE — PROGRESS NOTES
Patient was seen in the ED on 3/1/24 for UTI w/ hematuria. Patient was contacted for post ED discharge navigation. They have an appointment scheduled with Dr. Mesfin Hodges on 3/12/24 at 10:20. ED navigator will remind patient of appointment.

## 2024-03-04 NOTE — PLAN OF CARE
Problem: Adult Inpatient Plan of Care  Goal: Plan of Care Review  Outcome: Ongoing, Progressing  Goal: Patient-Specific Goal (Individualized)  Outcome: Ongoing, Progressing  Goal: Absence of Hospital-Acquired Illness or Injury  Outcome: Ongoing, Progressing  Goal: Optimal Comfort and Wellbeing  Outcome: Ongoing, Progressing  Goal: Readiness for Transition of Care  Outcome: Ongoing, Progressing     Problem: Infection  Goal: Absence of Infection Signs and Symptoms  Outcome: Ongoing, Progressing     Problem: Fall Injury Risk  Goal: Absence of Fall and Fall-Related Injury  Outcome: Ongoing, Progressing     Problem: Skin Injury Risk Increased  Goal: Skin Health and Integrity  Outcome: Ongoing, Progressing      [phone]

## 2024-03-07 ENCOUNTER — PATIENT MESSAGE (OUTPATIENT)
Dept: INTERNAL MEDICINE | Facility: CLINIC | Age: 69
End: 2024-03-07
Payer: MEDICARE

## 2024-03-07 ENCOUNTER — TELEPHONE (OUTPATIENT)
Dept: INTERNAL MEDICINE | Facility: CLINIC | Age: 69
End: 2024-03-07
Payer: MEDICARE

## 2024-03-07 NOTE — TELEPHONE ENCOUNTER
----- Message from Violeta Fisher sent at 3/7/2024  9:26 AM CST -----  Contact: [phone] Patient  [email or web address]     Patient is calling for Medical Advice regarding: ulcers in mouth and throat, been rinsing with Dr Brody's not getting any better getting worse    How long has patient had these symptoms: 1 week    Pharmacy name and phone#:  [facility] [place] [facility] [place] - [address]  [address]  [place] [address]  Phone: [phone] Fax: [phone]        Would like response via [employer]:  Call Back please.  Pt is asking if she can speak to the doctor to see what to do about the ulcers. Thank you    Comments:

## 2024-03-07 NOTE — TELEPHONE ENCOUNTER
Spoke to patient who states she has sores in her mouth and on her lips. I advised patient to be seen by someone. Patient states she doesn't want to see anyone other than Dr. Hodges. Patient has an upcoming appt with Dr. Hodges on 03/12.

## 2024-03-08 DIAGNOSIS — S92.414A CLOSED NONDISPLACED FRACTURE OF PROXIMAL PHALANX OF RIGHT GREAT TOE, INITIAL ENCOUNTER: Primary | ICD-10-CM

## 2024-03-10 ENCOUNTER — HOSPITAL ENCOUNTER (EMERGENCY)
Facility: HOSPITAL | Age: 69
Discharge: HOME | End: 2024-03-10
Attending: EMERGENCY MEDICINE
Payer: MEDICARE

## 2024-03-10 VITALS
TEMPERATURE: 99 F | WEIGHT: 239 LBS | BODY MASS INDEX: 40.8 KG/M2 | HEART RATE: 82 BPM | HEIGHT: 64 IN | RESPIRATION RATE: 18 BRPM | SYSTOLIC BLOOD PRESSURE: 129 MMHG | DIASTOLIC BLOOD PRESSURE: 88 MMHG | OXYGEN SATURATION: 96 %

## 2024-03-10 DIAGNOSIS — N30.00 ACUTE CYSTITIS WITHOUT HEMATURIA: Primary | ICD-10-CM

## 2024-03-10 LAB
ALBUMIN SERPL BCP-MCNC: 3.8 G/DL (ref 3.5–5.2)
ALP SERPL-CCNC: 125 U/L (ref 55–135)
ALT SERPL W/O P-5'-P-CCNC: 8 U/L (ref 10–44)
ANION GAP SERPL CALC-SCNC: 10 MMOL/L (ref 8–16)
AST SERPL-CCNC: 13 U/L (ref 10–40)
BACTERIA #/AREA URNS HPF: ABNORMAL /HPF
BASOPHILS # BLD AUTO: 0.02 K/UL (ref 0–0.2)
BASOPHILS NFR BLD: 0.4 % (ref 0–1.9)
BILIRUB SERPL-MCNC: 0.5 MG/DL (ref 0.1–1)
BILIRUB UR QL STRIP: NEGATIVE
BUN SERPL-MCNC: 12 MG/DL (ref 8–23)
CALCIUM SERPL-MCNC: 9.4 MG/DL (ref 8.7–10.5)
CHLORIDE SERPL-SCNC: 97 MMOL/L (ref 95–110)
CK SERPL-CCNC: 63 U/L (ref 20–180)
CLARITY UR: CLEAR
CO2 SERPL-SCNC: 35 MMOL/L (ref 23–29)
COLOR UR: YELLOW
CREAT SERPL-MCNC: 1.1 MG/DL (ref 0.5–1.4)
DIFFERENTIAL METHOD BLD: ABNORMAL
EOSINOPHIL # BLD AUTO: 0.3 K/UL (ref 0–0.5)
EOSINOPHIL NFR BLD: 6.6 % (ref 0–8)
ERYTHROCYTE [DISTWIDTH] IN BLOOD BY AUTOMATED COUNT: 15.2 % (ref 11.5–14.5)
EST. GFR  (NO RACE VARIABLE): 55 ML/MIN/1.73 M^2
GLUCOSE SERPL-MCNC: 120 MG/DL (ref 70–110)
GLUCOSE UR QL STRIP: NEGATIVE
HCT VFR BLD AUTO: 34.9 % (ref 37–48.5)
HGB BLD-MCNC: 10.9 G/DL (ref 12–16)
HGB UR QL STRIP: ABNORMAL
HYALINE CASTS #/AREA URNS LPF: 2 /LPF
IMM GRANULOCYTES # BLD AUTO: 0.02 K/UL (ref 0–0.04)
IMM GRANULOCYTES NFR BLD AUTO: 0.4 % (ref 0–0.5)
KETONES UR QL STRIP: NEGATIVE
LACTATE SERPL-SCNC: 1 MMOL/L (ref 0.5–2.2)
LEUKOCYTE ESTERASE UR QL STRIP: ABNORMAL
LYMPHOCYTES # BLD AUTO: 1.3 K/UL (ref 1–4.8)
LYMPHOCYTES NFR BLD: 27.7 % (ref 18–48)
MAGNESIUM SERPL-MCNC: 2.3 MG/DL (ref 1.6–2.6)
MCH RBC QN AUTO: 27.9 PG (ref 27–31)
MCHC RBC AUTO-ENTMCNC: 31.2 G/DL (ref 32–36)
MCV RBC AUTO: 89 FL (ref 82–98)
MICROSCOPIC COMMENT: ABNORMAL
MONOCYTES # BLD AUTO: 0.6 K/UL (ref 0.3–1)
MONOCYTES NFR BLD: 11.6 % (ref 4–15)
NEUTROPHILS # BLD AUTO: 2.6 K/UL (ref 1.8–7.7)
NEUTROPHILS NFR BLD: 53.3 % (ref 38–73)
NITRITE UR QL STRIP: NEGATIVE
NRBC BLD-RTO: 0 /100 WBC
PH UR STRIP: 7 [PH] (ref 5–8)
PLATELET # BLD AUTO: 214 K/UL (ref 150–450)
PMV BLD AUTO: 10 FL (ref 9.2–12.9)
POTASSIUM SERPL-SCNC: 3.2 MMOL/L (ref 3.5–5.1)
PROT SERPL-MCNC: 8 G/DL (ref 6–8.4)
PROT UR QL STRIP: ABNORMAL
RBC # BLD AUTO: 3.91 M/UL (ref 4–5.4)
RBC #/AREA URNS HPF: 21 /HPF (ref 0–4)
SODIUM SERPL-SCNC: 142 MMOL/L (ref 136–145)
SP GR UR STRIP: 1.01 (ref 1–1.03)
SQUAMOUS #/AREA URNS HPF: 0 /HPF
T4 FREE SERPL-MCNC: 1.04 NG/DL (ref 0.71–1.51)
TROPONIN I SERPL DL<=0.01 NG/ML-MCNC: 0.02 NG/ML (ref 0–0.03)
TSH SERPL DL<=0.005 MIU/L-ACNC: 7.31 UIU/ML (ref 0.4–4)
URN SPEC COLLECT METH UR: ABNORMAL
UROBILINOGEN UR STRIP-ACNC: NEGATIVE EU/DL
WBC # BLD AUTO: 4.83 K/UL (ref 3.9–12.7)
WBC #/AREA URNS HPF: 8 /HPF (ref 0–5)
YEAST URNS QL MICRO: ABNORMAL

## 2024-03-10 PROCEDURE — 87088 URINE BACTERIA CULTURE: CPT | Performed by: EMERGENCY MEDICINE

## 2024-03-10 PROCEDURE — 81000 URINALYSIS NONAUTO W/SCOPE: CPT | Performed by: EMERGENCY MEDICINE

## 2024-03-10 PROCEDURE — 96361 HYDRATE IV INFUSION ADD-ON: CPT | Mod: HCNC

## 2024-03-10 PROCEDURE — 96365 THER/PROPH/DIAG IV INF INIT: CPT | Mod: HCNC

## 2024-03-10 PROCEDURE — 84439 ASSAY OF FREE THYROXINE: CPT | Performed by: EMERGENCY MEDICINE

## 2024-03-10 PROCEDURE — 84484 ASSAY OF TROPONIN QUANT: CPT | Performed by: EMERGENCY MEDICINE

## 2024-03-10 PROCEDURE — 87106 FUNGI IDENTIFICATION YEAST: CPT | Performed by: EMERGENCY MEDICINE

## 2024-03-10 PROCEDURE — 99285 EMERGENCY DEPT VISIT HI MDM: CPT | Mod: 25,HCNC

## 2024-03-10 PROCEDURE — 96375 TX/PRO/DX INJ NEW DRUG ADDON: CPT | Mod: HCNC

## 2024-03-10 PROCEDURE — 87086 URINE CULTURE/COLONY COUNT: CPT | Performed by: EMERGENCY MEDICINE

## 2024-03-10 PROCEDURE — 25000003 PHARM REV CODE 250: Performed by: EMERGENCY MEDICINE

## 2024-03-10 PROCEDURE — 63600175 PHARM REV CODE 636 W HCPCS: Performed by: EMERGENCY MEDICINE

## 2024-03-10 PROCEDURE — 83735 ASSAY OF MAGNESIUM: CPT | Performed by: EMERGENCY MEDICINE

## 2024-03-10 PROCEDURE — 87040 BLOOD CULTURE FOR BACTERIA: CPT | Performed by: EMERGENCY MEDICINE

## 2024-03-10 PROCEDURE — 85025 COMPLETE CBC W/AUTO DIFF WBC: CPT | Performed by: EMERGENCY MEDICINE

## 2024-03-10 PROCEDURE — 80053 COMPREHEN METABOLIC PANEL: CPT | Performed by: EMERGENCY MEDICINE

## 2024-03-10 PROCEDURE — 82550 ASSAY OF CK (CPK): CPT | Performed by: EMERGENCY MEDICINE

## 2024-03-10 PROCEDURE — 83605 ASSAY OF LACTIC ACID: CPT | Performed by: EMERGENCY MEDICINE

## 2024-03-10 PROCEDURE — 84443 ASSAY THYROID STIM HORMONE: CPT | Performed by: EMERGENCY MEDICINE

## 2024-03-10 RX ORDER — POTASSIUM CHLORIDE 20 MEQ/1
20 TABLET, EXTENDED RELEASE ORAL
Status: COMPLETED | OUTPATIENT
Start: 2024-03-10 | End: 2024-03-10

## 2024-03-10 RX ORDER — HYDROCODONE BITARTRATE AND ACETAMINOPHEN 10; 325 MG/1; MG/1
1 TABLET ORAL
Status: COMPLETED | OUTPATIENT
Start: 2024-03-10 | End: 2024-03-10

## 2024-03-10 RX ORDER — ONDANSETRON HYDROCHLORIDE 2 MG/ML
4 INJECTION, SOLUTION INTRAVENOUS
Status: COMPLETED | OUTPATIENT
Start: 2024-03-10 | End: 2024-03-10

## 2024-03-10 RX ORDER — KETOROLAC TROMETHAMINE 30 MG/ML
15 INJECTION, SOLUTION INTRAMUSCULAR; INTRAVENOUS
Status: COMPLETED | OUTPATIENT
Start: 2024-03-10 | End: 2024-03-10

## 2024-03-10 RX ADMIN — POTASSIUM CHLORIDE 20 MEQ: 1500 TABLET, EXTENDED RELEASE ORAL at 08:03

## 2024-03-10 RX ADMIN — KETOROLAC TROMETHAMINE 15 MG: 30 INJECTION, SOLUTION INTRAMUSCULAR; INTRAVENOUS at 08:03

## 2024-03-10 RX ADMIN — ONDANSETRON 4 MG: 2 INJECTION INTRAMUSCULAR; INTRAVENOUS at 08:03

## 2024-03-10 RX ADMIN — CEFTRIAXONE SODIUM 1 G: 1 INJECTION, POWDER, FOR SOLUTION INTRAMUSCULAR; INTRAVENOUS at 08:03

## 2024-03-10 RX ADMIN — SODIUM CHLORIDE 250 ML: 9 INJECTION, SOLUTION INTRAVENOUS at 08:03

## 2024-03-10 RX ADMIN — HYDROCODONE BITARTRATE AND ACETAMINOPHEN 1 TABLET: 10; 325 TABLET ORAL at 10:03

## 2024-03-10 NOTE — ED NOTES
Pt states that she was able to contact her daughter and that she will be able to transport pt back to her residence.

## 2024-03-10 NOTE — DISCHARGE INSTRUCTIONS
Continue your current antibiotic until finished.   Samples Given: Eucerin Itch Relief lotion. \\nCeraVe healing ointment. Detail Level: Simple

## 2024-03-11 ENCOUNTER — PATIENT OUTREACH (OUTPATIENT)
Dept: EMERGENCY MEDICINE | Facility: HOSPITAL | Age: 69
End: 2024-03-11
Payer: MEDICARE

## 2024-03-11 LAB — BACTERIA UR CULT: ABNORMAL

## 2024-03-11 NOTE — PROGRESS NOTES
ED Navigator reminded the patient of scheduled appointment with Dr. Nic Carranza on 3/12/24 at 11:30. Patient instructed to bring a photo I.D., health insurance card, and a list of current medications.

## 2024-03-11 NOTE — PROGRESS NOTES
Patient was seen in the ED on 3/10/24 for acute cystitis w/o hematuria. Patient was contacted for post ED discharge navigation. They have an appointment scheduled with Dr. Cat Cortés on 3/13/24 at 1:00. ED navigator will remind patient of appointment.

## 2024-03-12 ENCOUNTER — PATIENT OUTREACH (OUTPATIENT)
Dept: EMERGENCY MEDICINE | Facility: HOSPITAL | Age: 69
End: 2024-03-12
Payer: MEDICARE

## 2024-03-12 NOTE — PROGRESS NOTES
ED Navigator reminded the patient of scheduled appointment with Dr. Cat Cortés on 3/13/24 at 1:00. Patient instructed to bring a photo I.D., health insurance card, and a list of current medications.

## 2024-03-13 ENCOUNTER — TELEPHONE (OUTPATIENT)
Dept: INTERNAL MEDICINE | Facility: CLINIC | Age: 69
End: 2024-03-13
Payer: MEDICARE

## 2024-03-13 ENCOUNTER — OFFICE VISIT (OUTPATIENT)
Dept: GASTROENTEROLOGY | Facility: CLINIC | Age: 69
End: 2024-03-13
Payer: MEDICARE

## 2024-03-13 ENCOUNTER — TELEPHONE (OUTPATIENT)
Dept: ENDOSCOPY | Facility: HOSPITAL | Age: 69
End: 2024-03-13
Payer: MEDICARE

## 2024-03-13 VITALS — BODY MASS INDEX: 38.99 KG/M2 | WEIGHT: 234 LBS | HEIGHT: 65 IN

## 2024-03-13 VITALS
SYSTOLIC BLOOD PRESSURE: 115 MMHG | HEART RATE: 71 BPM | BODY MASS INDEX: 39.65 KG/M2 | WEIGHT: 238 LBS | HEIGHT: 65 IN | DIASTOLIC BLOOD PRESSURE: 67 MMHG

## 2024-03-13 DIAGNOSIS — R13.10 DYSPHAGIA, UNSPECIFIED TYPE: Primary | ICD-10-CM

## 2024-03-13 DIAGNOSIS — K59.00 CONSTIPATION, UNSPECIFIED CONSTIPATION TYPE: ICD-10-CM

## 2024-03-13 DIAGNOSIS — J96.12 CHRONIC RESPIRATORY FAILURE WITH HYPOXIA AND HYPERCAPNIA: Primary | ICD-10-CM

## 2024-03-13 DIAGNOSIS — R49.9 VOICE COMPLAINT: Primary | ICD-10-CM

## 2024-03-13 DIAGNOSIS — J96.11 CHRONIC RESPIRATORY FAILURE WITH HYPOXIA AND HYPERCAPNIA: Primary | ICD-10-CM

## 2024-03-13 DIAGNOSIS — R11.10 REGURGITATION AND RECHEWING: ICD-10-CM

## 2024-03-13 PROCEDURE — 1100F PTFALLS ASSESS-DOCD GE2>/YR: CPT | Mod: HCNC,CPTII,S$GLB, | Performed by: INTERNAL MEDICINE

## 2024-03-13 PROCEDURE — 1111F DSCHRG MED/CURRENT MED MERGE: CPT | Mod: HCNC,CPTII,S$GLB, | Performed by: INTERNAL MEDICINE

## 2024-03-13 PROCEDURE — 3288F FALL RISK ASSESSMENT DOCD: CPT | Mod: HCNC,CPTII,S$GLB, | Performed by: INTERNAL MEDICINE

## 2024-03-13 PROCEDURE — 3074F SYST BP LT 130 MM HG: CPT | Mod: HCNC,CPTII,S$GLB, | Performed by: INTERNAL MEDICINE

## 2024-03-13 PROCEDURE — 3008F BODY MASS INDEX DOCD: CPT | Mod: HCNC,CPTII,S$GLB, | Performed by: INTERNAL MEDICINE

## 2024-03-13 PROCEDURE — 99214 OFFICE O/P EST MOD 30 MIN: CPT | Mod: HCNC,S$GLB,, | Performed by: INTERNAL MEDICINE

## 2024-03-13 PROCEDURE — 1125F AMNT PAIN NOTED PAIN PRSNT: CPT | Mod: HCNC,CPTII,S$GLB, | Performed by: INTERNAL MEDICINE

## 2024-03-13 PROCEDURE — 3078F DIAST BP <80 MM HG: CPT | Mod: HCNC,CPTII,S$GLB, | Performed by: INTERNAL MEDICINE

## 2024-03-13 PROCEDURE — 99999 PR PBB SHADOW E&M-EST. PATIENT-LVL III: CPT | Mod: PBBFAC,HCNC,, | Performed by: INTERNAL MEDICINE

## 2024-03-13 NOTE — TELEPHONE ENCOUNTER
Spoke to patient and   to schedule procedure(s) Esophageal Manometry       Physician to perform procedure(s) Dr. NIEVES Cortés   Date of Procedure (s) 04/29/2024  Arrival Time 9:00 Am   Time of Procedure(s) 10:00 Am    Location of Procedure(s) [address]  Type of Rx Prep sent to patient: N/A  Instructions provided to patient via [facility]    Patient was informed on the following information and verbalized understanding. Screening questionnaire reviewed with patient and complete. If procedure requires anesthesia, a responsible adult needs to be present to accompany the patient home, patient cannot drive after receiving anesthesia. Appointment details are tentative, especially check-in time. Patient will receive a prep-op call 7 days prior to confirm check-in time for procedure. If applicable the patient should contact their pharmacy to verify Rx for procedure prep is ready for pick-up. Patient was advised to call the scheduling department at [phone] if pharmacy states no Rx is available. Patient was advised to call the endoscopy scheduling department if any questions or concerns arise.      [facility] Scheduling Department

## 2024-03-13 NOTE — PROGRESS NOTES
"[facility] Note    Referral from Dr. Vance    CC: dysphagia    HPI 68 y.o. female with past medical history of oropharyngeal dysphagia who presents with chronic, daily, solid and liquid food dysphagia with associated regurgitation and chest pain.  She also coughs and chokes with eating.  She denies GERD symptoms.  She has nausea controlled with Zofran.  She has constipation not improved with Senna, stool softeners, Miralax or Metamucil.    She reports recent voice changes that are bothersome.    Achalasia Eckardt Score  Dysphagia  (none (0), occasional (1), daily (2), with every meal (3)): 3  Regurgitation (none (0), occasional (1), daily (2), with every meal (3)): 3  Chest pain (none (0), occasional (1), daily (2), several times per day (3)): 3  Weight loss (kg) (0 (0), <5 (1), 5-10 (2), >10 (3)): 0  Total Eckardt Score(the final score is the sum of four components (0-12)):  9          Past Medical History  Past Medical History:   Diagnosis Date    Anxiety     Arthritis     Bilateral lower extremity edema     severe chronic    Carotid artery occlusion     Cataract     CHF (congestive heart failure)     Coronary artery disease     subtotalled LAD with collateral    Depression     Fever blister     Hard of hearing     Hypokalemia 01/09/2023    Hyponatremia 02/04/2022    Hypothyroid     Iron deficiency anemia     Lumbar radiculopathy     with chronic pain    Ocular migraine     Other emphysema 05/22/2023    Renal disorder     Sleep apnea     cpap       Physical Examination  /67   Pulse 71   Ht 5' 5" (1.651 m)   Wt 108 kg (238 lb)   LMP  (LMP Unknown)   BMI 39.61 kg/m²   General appearance: alert, cooperative, no distress  HENT: Normocephalic, atraumatic, neck symmetrical, no nasal discharge   Abdomen: soft, mildly ttp , no rebound or guarding  Extremities: extremities symmetric; positive for significant edema  Neurologic: Alert and oriented, responds appropriately to questions, moves her " extremities    Labs:  Lab Results   Component Value Date    WBC 4.83 03/10/2024    HGB 10.9 (L) 03/10/2024    HCT 34.9 (L) 03/10/2024    MCV 89 03/10/2024     03/10/2024         CMP  Sodium   Date Value Ref Range Status   03/10/2024 142 136 - 145 mmol/L Final     Potassium   Date Value Ref Range Status   03/10/2024 3.2 (L) 3.5 - 5.1 mmol/L Final     Chloride   Date Value Ref Range Status   03/10/2024 97 95 - 110 mmol/L Final     CO2   Date Value Ref Range Status   03/10/2024 35 (H) 23 - 29 mmol/L Final     Glucose   Date Value Ref Range Status   03/10/2024 120 (H) 70 - 110 mg/dL Final     BUN   Date Value Ref Range Status   03/10/2024 12 8 - 23 mg/dL Final     Creatinine   Date Value Ref Range Status   03/10/2024 1.1 0.5 - 1.4 mg/dL Final     Calcium   Date Value Ref Range Status   03/10/2024 9.4 8.7 - 10.5 mg/dL Final     Total Protein   Date Value Ref Range Status   03/10/2024 8.0 6.0 - 8.4 g/dL Final     Albumin   Date Value Ref Range Status   03/10/2024 3.8 3.5 - 5.2 g/dL Final     Total Bilirubin   Date Value Ref Range Status   03/10/2024 0.5 0.1 - 1.0 mg/dL Final     Comment:     For infants and newborns, interpretation of results should be based  on gestational age, weight and in agreement with clinical  observations.    Premature Infant recommended reference ranges:  Up to 24 hours.............<8.0 mg/dL  Up to 48 hours............<12.0 mg/dL  3-5 days..................<15.0 mg/dL  6-29 days.................<15.0 mg/dL       Alkaline Phosphatase   Date Value Ref Range Status   03/10/2024 125 55 - 135 U/L Final     AST   Date Value Ref Range Status   03/10/2024 13 10 - 40 U/L Final     ALT   Date Value Ref Range Status   03/10/2024 8 (L) 10 - 44 U/L Final     Anion Gap   Date Value Ref Range Status   03/10/2024 10 8 - 16 mmol/L Final     eGFR   Date Value Ref Range Status   03/10/2024 55 (A) >60 mL/min/1.73 m^2 Final         Assessment:   1. Voice complaint    2. Constipation, unspecified constipation  type    3.      Dysphagia  4.      Oropharyngeal dysphagia    Plan:  -Referral to home health speech therapy.  She has already seen speech and they discharged her from speech therapy due to missed appts and recommended home health speech therapy.  We will try to figure out how to get this for her as she already has home health.  Addendum:  Discussed with her PCP Dr. Hodges and he has added Speech therapy onto her home health orders.  -Referral to ENT for her voice complaint.  -Schedule esophageal manometry with dysphagia and rumination protocol.  -Start Linzess 72mcg daily for constipation.    Cat Cortés MD

## 2024-03-13 NOTE — TELEPHONE ENCOUNTER
----- Message from Joseph Gentile sent at 3/13/2024 10:23 AM CDT -----  Contact: Pt's   [phone]  Pt's  called in regards to getting an orders for a portable oxygen tank please advise.

## 2024-03-13 NOTE — TELEPHONE ENCOUNTER
"----- Message from Cat Cortés MD sent at 3/13/2024  2:43 PM CDT -----  Regarding: Esophageal manometry with dysphagia protocol and rumination protocol  Procedure: Esophageal manometry with dysphagia and rumination protocol    Diagnosis: Dysphagia, regurgitation    Procedure Timing: Next available    #If within 4 weeks selected, please shyla as high priority#    #If greater than 12 weeks, please select "5-12 weeks" and delay sending until 3 months prior to requested date#     Provider: Myself    Location: [address]    Additional Scheduling Information: No scheduling concerns    Prep Specifications:Standard prep    Is the patient taking a GLP-1 Agonist:no    Have you attached a patient to this message: yes      "

## 2024-03-14 ENCOUNTER — TELEPHONE (OUTPATIENT)
Dept: INTERNAL MEDICINE | Facility: CLINIC | Age: 69
End: 2024-03-14
Payer: MEDICARE

## 2024-03-14 ENCOUNTER — TELEPHONE (OUTPATIENT)
Dept: ORTHOPEDICS | Facility: CLINIC | Age: 69
End: 2024-03-14

## 2024-03-14 DIAGNOSIS — R13.12 OROPHARYNGEAL DYSPHAGIA: Primary | ICD-10-CM

## 2024-03-14 NOTE — TELEPHONE ENCOUNTER
Left message for patient. Not sure if she wanted to reschedule her 3/20 appt.  But does have an appt on 3/20. Requested to call us back.

## 2024-03-14 NOTE — TELEPHONE ENCOUNTER
----- Message from Cat Cortés MD sent at 3/13/2024  3:30 PM CDT -----  Regarding: Home health Speech therapy  Hi Dr. Hodges,    I saw Mrs. Cordova today.  She had previously seen speech for her oropharyngeal dysphagia but they discharged her because she was non compliant with her visits.  They recommended home health speech therapy.  Are you able to set that up for her?  She currently has home health.    Sincerely,    Cat Cortés

## 2024-03-14 NOTE — TELEPHONE ENCOUNTER
----- Message from Cat Park sent at 3/14/2024  1:12 PM CDT -----  Type:  Same Day Appointment Request    Caller is requesting a same day appointment.  Caller declined first available appointment listed below.    Name of Caller:pt   When is the first available appointment?03/20  Symptoms:Left foot pain   Best Call Back Number: [phone]  Additional Information:

## 2024-03-15 ENCOUNTER — TELEPHONE (OUTPATIENT)
Dept: ENDOSCOPY | Facility: HOSPITAL | Age: 69
End: 2024-03-15
Payer: MEDICARE

## 2024-03-15 ENCOUNTER — OFFICE VISIT (OUTPATIENT)
Dept: PODIATRY | Facility: CLINIC | Age: 69
End: 2024-03-15
Payer: MEDICARE

## 2024-03-15 ENCOUNTER — TELEPHONE (OUTPATIENT)
Dept: INTERNAL MEDICINE | Facility: CLINIC | Age: 69
End: 2024-03-15
Payer: MEDICARE

## 2024-03-15 VITALS
HEIGHT: 65 IN | DIASTOLIC BLOOD PRESSURE: 83 MMHG | RESPIRATION RATE: 18 BRPM | TEMPERATURE: 98 F | OXYGEN SATURATION: 99 % | SYSTOLIC BLOOD PRESSURE: 133 MMHG | BODY MASS INDEX: 38.98 KG/M2 | HEART RATE: 83 BPM | WEIGHT: 233.94 LBS

## 2024-03-15 DIAGNOSIS — T14.8XXA BLISTER OF SKIN: ICD-10-CM

## 2024-03-15 DIAGNOSIS — I89.0 LYMPHEDEMA OF BOTH LOWER EXTREMITIES: Primary | ICD-10-CM

## 2024-03-15 LAB
BACTERIA BLD CULT: NORMAL
BACTERIA BLD CULT: NORMAL

## 2024-03-15 PROCEDURE — 1100F PTFALLS ASSESS-DOCD GE2>/YR: CPT | Mod: CPTII,S$GLB,, | Performed by: STUDENT IN AN ORGANIZED HEALTH CARE EDUCATION/TRAINING PROGRAM

## 2024-03-15 PROCEDURE — 99999 PR PBB SHADOW E&M-EST. PATIENT-LVL V: CPT | Mod: PBBFAC,,, | Performed by: STUDENT IN AN ORGANIZED HEALTH CARE EDUCATION/TRAINING PROGRAM

## 2024-03-15 PROCEDURE — 3075F SYST BP GE 130 - 139MM HG: CPT | Mod: CPTII,S$GLB,, | Performed by: STUDENT IN AN ORGANIZED HEALTH CARE EDUCATION/TRAINING PROGRAM

## 2024-03-15 PROCEDURE — 29580 STRAPPING UNNA BOOT: CPT | Mod: 50,S$GLB,, | Performed by: STUDENT IN AN ORGANIZED HEALTH CARE EDUCATION/TRAINING PROGRAM

## 2024-03-15 PROCEDURE — 3008F BODY MASS INDEX DOCD: CPT | Mod: CPTII,S$GLB,, | Performed by: STUDENT IN AN ORGANIZED HEALTH CARE EDUCATION/TRAINING PROGRAM

## 2024-03-15 PROCEDURE — 1159F MED LIST DOCD IN RCRD: CPT | Mod: CPTII,S$GLB,, | Performed by: STUDENT IN AN ORGANIZED HEALTH CARE EDUCATION/TRAINING PROGRAM

## 2024-03-15 PROCEDURE — 1125F AMNT PAIN NOTED PAIN PRSNT: CPT | Mod: CPTII,S$GLB,, | Performed by: STUDENT IN AN ORGANIZED HEALTH CARE EDUCATION/TRAINING PROGRAM

## 2024-03-15 PROCEDURE — 3079F DIAST BP 80-89 MM HG: CPT | Mod: CPTII,S$GLB,, | Performed by: STUDENT IN AN ORGANIZED HEALTH CARE EDUCATION/TRAINING PROGRAM

## 2024-03-15 PROCEDURE — 99213 OFFICE O/P EST LOW 20 MIN: CPT | Mod: 25,S$GLB,, | Performed by: STUDENT IN AN ORGANIZED HEALTH CARE EDUCATION/TRAINING PROGRAM

## 2024-03-15 PROCEDURE — 3288F FALL RISK ASSESSMENT DOCD: CPT | Mod: CPTII,S$GLB,, | Performed by: STUDENT IN AN ORGANIZED HEALTH CARE EDUCATION/TRAINING PROGRAM

## 2024-03-15 NOTE — TELEPHONE ENCOUNTER
----- Message from Allison Poole sent at 3/15/2024  3:17 PM CDT -----  Contact: Sammi [phone]  Sammi,  with [facility] requesting orders for inpatient rehab.  Please call back to advise

## 2024-03-15 NOTE — PROGRESS NOTES
Subjective:     Patient    Tasha Hawley is a 68 y.o. female.    Problem    09/21/23: Previously followed outpatient by Dr Mcguire. 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 Has ongoing right 1st toe pain, poorly controlled on opioids. Uses surgical shoes. Also has home health wrapping her legs every other day for lymphedema, does not have a physician following her for lymphedema.     10/02/23: Stopped amitriptyline due to side effects. Requesting refill on tramadol. Has followup with pain management in 2 weeks.     12/28/23: Returns for severe bilateral lower leg and foot pain, has been having falls lately and cannot walk. Now starting lymphedema therapy and so the insurance will not also cover HH dressing changes.     01/17/24: Lymphedema therapy changing dressings every week. On duloxetine 60 mg/day, Norco 10, tramadol with ongoing severe pain. More alert than at last visit. Patient continues to have multiple falls per week,  reports difficulty managing her healthcare needs at home by himself.     03/15/24: Multiple falls and presentations to ED with hospitalization and evaluation by orthopedic surgery since last visit. Ongoing chronic pain, seen by pain medicine last week. Now has HH wound care and PT. Known fractures of both feet in surgical shoes.     History    History obtained from patient and review of medical records.     Past Medical History:   Diagnosis Date    Anxiety     Arthritis     Bilateral lower extremity edema     severe chronic    Carotid artery occlusion     Cataract     CHF (congestive heart failure)     Coronary artery disease     subtotalled LAD with collateral    Depression     Fever blister     Hard of hearing     Hypokalemia 01/09/2023    Hyponatremia 02/04/2022    Hypothyroid     Iron deficiency anemia     Lumbar radiculopathy     with chronic pain    Ocular migraine      Other emphysema 05/22/2023    Renal disorder     Sleep apnea     cpap       Past Surgical History:   Procedure Laterality Date    ADENOIDECTOMY      BACK SURGERY      x 12    CARDIAC CATHETERIZATION  2016    subtotalled LAD with right to left collaterals    CATARACT EXTRACTION W/  INTRAOCULAR LENS IMPLANT Left     Dr Coleman     CYSTOSCOPIC LITHOLAPAXY N/A 6/27/2019    Procedure: CYSTOLITHOLAPAXY;  Surgeon: Shireen Mayo MD;  Location: [facility] OR 2ND FLR;  Service: Urology;  Laterality: N/A;    CYSTOSCOPIC LITHOLAPAXY N/A 9/3/2019    Procedure: CYSTOLITHOLAPAXY;  Surgeon: Shireen Mayo MD;  Location: [facility] OR 2ND FLR;  Service: Urology;  Laterality: N/A;    CYSTOSCOPY N/A 7/13/2021    Procedure: CYSTOSCOPY;  Surgeon: Shireen Mayo MD;  Location: [facility] OR 1ST FLR;  Service: Urology;  Laterality: N/A;    CYSTOSCOPY  11/16/2021    Procedure: CYSTOSCOPY;  Surgeon: Shireen Mayo MD;  Location: [facility] OR 1ST FLR;  Service: Urology;;    CYSTOSCOPY  7/19/2022    Procedure: CYSTOSCOPY;  Surgeon: Shireen Mayo MD;  Location: [facility] OR 1ST FLR;  Service: Urology;;    CYSTOSCOPY WITH INJECTION OF PERIURETHRAL BULKING AGENT  7/19/2022    Procedure: CYSTOSCOPY, WITH PERIURETHRAL BULKING AGENT INJECTION-MACROPLASTIQUE;  Surgeon: Shireen Mayo MD;  Location: [facility] OR 1ST FLR;  Service: Urology;;    CYSTOSCOPY WITH INJECTION OF PERIURETHRAL BULKING AGENT N/A 3/28/2023    Procedure: CYSTOSCOPY, WITH PERIURETHRAL BULKING AGENT INJECTION;  Surgeon: Shireen Mayo MD;  Location: [facility] OR 1ST FLR;  Service: Urology;  Laterality: N/A;  [place]    CYSTOSCOPY WITH INJECTION OF PERIURETHRAL BULKING AGENT N/A 11/14/2023    Procedure: CYSTOSCOPY, WITH PERIURETHRAL BULKING AGENT INJECTION;  Surgeon: Shireen Mayo MD;  Location: [facility] OR 1ST FLR;  Service: Urology;  Laterality: N/A;    CYSTOSCOPY,WITH BOTULINUM TOXIN INJECTION N/A 12/13/2022    Procedure: CYSTOSCOPY,WITH BOTULINUM TOXIN INJECTION;  Surgeon: Shireen Mayo,  MD;  [facility];  Service: Urology;  Laterality: N/A;  300 U    CYSTOSCOPY,WITH BOTULINUM TOXIN INJECTION N/A 3/28/2023    Procedure: CYSTOSCOPY,WITH BOTULINUM TOXIN INJECTION;  Surgeon: Shireen Mayo MD;  Location: [facility] OR 1ST FLR;  Service: Urology;  Laterality: N/A;  45 MIN.    300 UNITS    ESOPHAGOGASTRODUODENOSCOPY N/A 5/23/2018    Procedure: ESOPHAGOGASTRODUODENOSCOPY (EGD);  Surgeon: Prince Vance MD;  Location: [facility] ENDO (4TH FLR);  Service: Endoscopy;  Laterality: N/A;  r/s 'd per Dr. Vance due to family emergency- ER    ESOPHAGOGASTRODUODENOSCOPY N/A 6/23/2023    Procedure: EGD (ESOPHAGOGASTRODUODENOSCOPY);  Surgeon: Juaquin Barry MD;  Location: [facility] ENDO (2ND FLR);  Service: Endoscopy;  Laterality: N/A;  Refferal: PRINCE VANCE  Order  tele enc 5/18/23  dx: history of a Nissen fundoplication  Additional Scheduling Information:  On home oxygen 2nd floor for airway protection also with a pain pump elevated BMI close to 40   Prep Gastroparesis   ins    HYSTERECTOMY  1975    endometriosis    INJECTION OF BOTULINUM TOXIN TYPE A  7/13/2021    Procedure: INJECTION, BOTULINUM TOXIN, 200units;  Surgeon: Shireen Mayo MD;  Location: [facility] OR [address] [place] [address];  Service: Urology;;    INJECTION OF BOTULINUM TOXIN TYPE A  11/16/2021    Procedure: INJECTION, BOTULINUM TOXIN, 200units;  Surgeon: Shireen Mayo MD;  [facility];  Service: Urology;;    INJECTION OF BOTULINUM TOXIN TYPE A  7/19/2022    Procedure: INJECTION, BOTULINUM TOXIN, 300 units ;  Surgeon: Shireen Mayo MD;  [facility];  Service: Urology;;    INJECTION OF BOTULINUM TOXIN TYPE A N/A 11/14/2023    Procedure: INJECTION, BOTULINUM TOXIN, TYPE A;  Surgeon: Shireen Mayo MD;  [facility];  Service: Urology;  Laterality: N/A;    INSERTION, SUPRAPUBIC CATHETER N/A 12/13/2022    Procedure: INSERTION, SUPRAPUBIC CATHETER;  Surgeon: Shireen Mayo MD;  Location: [facility] OR [facility]  FLR;  Service: Urology;  Laterality: N/A;  exchange    INSERTION, SUPRAPUBIC CATHETER N/A 11/14/2023    Procedure: INSERTION, SUPRAPUBIC CATHETER;  Surgeon: Shireen Mayo MD;  [facility];  Service: Urology;  Laterality: N/A;  EXCHANGE of s/p tube    pain pump placement      SQ Dilaudid Pump managed by Dr. Hillman, Pain Management    REMOVAL OF BONE SPUR OF FOOT Bilateral 9/16/2022    Procedure: EXCISION ARTHRITIC BONE, BILATERAL FOOT;  Surgeon: Adam Mcguire DPM;  Location: [facility] OR;  Service: Podiatry;  Laterality: Bilateral;    REPLACEMENT OF BACLOFEN PUMP N/A 8/2/2023    Procedure: REPLACEMENT, BACLOFEN PUMP;  Surgeon: Smitha Condon MD;  Location: [facility] OR 2ND FLR;  Service: Neurosurgery;  Laterality: N/A;  Anes: Gen  Blood: Type & Screen  Pos: Left Lat  Intrathecal Pump  Bed: Reg Reversed  Rad: C-arm  Pump: 40 cc, CE [employer]    REPLACEMENT OF CATHETER N/A 10/31/2019    Procedure: REPLACEMENT, CATHETER-SUPRAPUBIC;  Surgeon: Shireen Mayo MD;  [facility];  Service: Urology;  Laterality: N/A;    SPINAL CORD STIMULATOR REMOVAL      before [place]    SPINE SURGERY  5-13-13    CERVICAL FUSION    TONSILLECTOMY          Objective:     Vitals  Wt Readings from Last 1 Encounters:   03/15/24 106.1 kg (233 lb 14.5 oz)     Temp Readings from Last 1 Encounters:   03/15/24 97.8 °F (36.6 °C)     BP Readings from Last 1 Encounters:   03/15/24 133/83     Pulse Readings from Last 1 Encounters:   03/15/24 83       Dermatological Exam    Skin:  Pedal hair growth diminished and Pedal skin thin and shiny on right; irritation and blistering of lower leg skin  Pedal hair growth diminished and Pedal skin thin and shiny on left; irritation and blistering of lower leg skin       Nails:  10 nail(s) thickened and 10 nail(s) discolored    Vascular Exam    Arteries:  Posterior tibial artery nonpalpable on right  Dorsalis pedis artery palpable on right  Posterior tibial artery nonpalpable on  left  Dorsalis pedis artery palpable on left    Veins:  Telangectasia on right  Telangectasia on left    Swellin+ nonpitting on right  2+ nonpitting on left    Neurological Exam    [place] touch test:  6/6 sites sensed, light touch intact     Musculoskeletal Exam    Footwear:  Surgical shoe on right  Surgical shoe on left    Gait Exam:   Ambulatory Status: Ambulatory  Gait: Normal  Assistive Devices: None    Foot Progression Angle:  Normal on right  Normal on left     Right Lower Extremity Additional Findings:  Diffuse severe pain from toes to knee  Right foot and ankle function, strength, and range of motion unremarkable except as noted above.     Left Lower Extremity Additional Findings:  Diffuse severe pain from toes to knee  Left foot and ankle function, strength, and range of motion unremarkable except as noted above.    Imaging and Other Tests    Imagin23 B foot and ankle X rays: bilateral flatfoot deformity, osteopenia, right 1st toe and 2nd-5th distal metatarsal fractures    Independently reviewed and interpreted imaging, findings are as follows: N/A     Assessment:     Encounter Diagnoses   Name Primary?    Lymphedema of both lower extremities Yes    Blister of skin               Plan:     I counseled the patient on her conditions, their implications and medical management.    Intractable pain, both lower legs and feet: chronic stable  -Severe intractable pain of both lower extremities, difficult to control despite multiple medications, has resulted in multiple ED visits, contributes to falling on a regular basis; high risk for hospitalization.   -Continue duloxetine 120 mg/day.   -Continue Norco and tramadol PRN, wean off as tolerated.     -Previously did not tolerate pregabalin or amitriptyline, may consider gabapentin.     Bilateral foot fractures: subacute    -Appreciate that patient cannot safely remain NWB.  -Continue protected WB BLE in surgical shoe for now.      Lymphedema,  blistering, cellulitis, rash: chronic stable   -Topical triamcinolone.   -Bilateral unna boot multilayer compression dressings applied to lower legs.    -Continue HH dressing changes.        Return to clinic in 1 month for skin check and dressing change, call sooner PRN.

## 2024-03-15 NOTE — TELEPHONE ENCOUNTER
"----- Message from Fatmata Franks sent at 3/15/2024  8:34 AM CDT -----  Regarding: FW: Esophageal manometry with dysphagia protocol and rumination protocol    ----- Message -----  From: Cat Cortés MD  Sent: 3/13/2024   2:44 PM CDT  To: [facility]  Subject: Esophageal manometry with dysphagia protocol#    Procedure: Esophageal manometry with dysphagia and rumination protocol    Diagnosis: Dysphagia, regurgitation    Procedure Timing: Next available    #If within 4 weeks selected, please shyla as high priority#    #If greater than 12 weeks, please select "5-12 weeks" and delay sending until 3 months prior to requested date#     Provider: Myself    Location: [address]    Additional Scheduling Information: No scheduling concerns    Prep Specifications:Standard prep    Is the patient taking a GLP-1 Agonist:no    Have you attached a patient to this message: yes        "

## 2024-03-18 ENCOUNTER — OUTPATIENT CASE MANAGEMENT (OUTPATIENT)
Dept: ADMINISTRATIVE | Facility: OTHER | Age: 69
End: 2024-03-18
Payer: MEDICARE

## 2024-03-18 NOTE — TELEPHONE ENCOUNTER
Please call the home health person.  We've discussed this with her many times.  She has never been interested in doing that.  I'm not going to order it unless she will actually go.  Has she stated that she wants to go to inpatient rehab?

## 2024-03-19 ENCOUNTER — OFFICE VISIT (OUTPATIENT)
Dept: INTERNAL MEDICINE | Facility: CLINIC | Age: 69
End: 2024-03-19
Payer: MEDICARE

## 2024-03-19 ENCOUNTER — PATIENT OUTREACH (OUTPATIENT)
Dept: ADMINISTRATIVE | Facility: OTHER | Age: 69
End: 2024-03-19
Payer: MEDICARE

## 2024-03-19 VITALS — SYSTOLIC BLOOD PRESSURE: 102 MMHG | HEART RATE: 48 BPM | DIASTOLIC BLOOD PRESSURE: 58 MMHG | OXYGEN SATURATION: 98 %

## 2024-03-19 DIAGNOSIS — Z93.59 SUPRAPUBIC CATHETER: Chronic | ICD-10-CM

## 2024-03-19 DIAGNOSIS — K59.03 DRUG-INDUCED CONSTIPATION: Chronic | ICD-10-CM

## 2024-03-19 DIAGNOSIS — I69.354 HEMIPLEGIA AND HEMIPARESIS FOLLOWING CEREBRAL INFARCTION AFFECTING LEFT NON-DOMINANT SIDE: ICD-10-CM

## 2024-03-19 DIAGNOSIS — I89.0 LYMPHEDEMA OF BOTH LOWER EXTREMITIES: Primary | ICD-10-CM

## 2024-03-19 DIAGNOSIS — J96.11 CHRONIC HYPOXIC RESPIRATORY FAILURE: ICD-10-CM

## 2024-03-19 DIAGNOSIS — L57.0 ACTINIC KERATOSES: ICD-10-CM

## 2024-03-19 DIAGNOSIS — Z98.890 S/P INSERTION OF INTRATHECAL PUMP: Chronic | ICD-10-CM

## 2024-03-19 DIAGNOSIS — G89.4 CHRONIC PAIN SYNDROME: Chronic | ICD-10-CM

## 2024-03-19 DIAGNOSIS — N31.9 NEUROGENIC BLADDER: Chronic | ICD-10-CM

## 2024-03-19 DIAGNOSIS — G95.9 CERVICAL MYELOPATHY: ICD-10-CM

## 2024-03-19 DIAGNOSIS — R29.6 FREQUENT FALLS: Chronic | ICD-10-CM

## 2024-03-19 PROBLEM — J18.9 PNEUMONIA: Status: RESOLVED | Noted: 2024-02-18 | Resolved: 2024-03-19

## 2024-03-19 PROCEDURE — 1160F RVW MEDS BY RX/DR IN RCRD: CPT | Mod: HCNC,CPTII,S$GLB, | Performed by: INTERNAL MEDICINE

## 2024-03-19 PROCEDURE — 99215 OFFICE O/P EST HI 40 MIN: CPT | Mod: HCNC,S$GLB,, | Performed by: INTERNAL MEDICINE

## 2024-03-19 PROCEDURE — 1111F DSCHRG MED/CURRENT MED MERGE: CPT | Mod: HCNC,CPTII,S$GLB, | Performed by: INTERNAL MEDICINE

## 2024-03-19 PROCEDURE — 3078F DIAST BP <80 MM HG: CPT | Mod: HCNC,CPTII,S$GLB, | Performed by: INTERNAL MEDICINE

## 2024-03-19 PROCEDURE — 1125F AMNT PAIN NOTED PAIN PRSNT: CPT | Mod: HCNC,CPTII,S$GLB, | Performed by: INTERNAL MEDICINE

## 2024-03-19 PROCEDURE — 1101F PT FALLS ASSESS-DOCD LE1/YR: CPT | Mod: HCNC,CPTII,S$GLB, | Performed by: INTERNAL MEDICINE

## 2024-03-19 PROCEDURE — 3288F FALL RISK ASSESSMENT DOCD: CPT | Mod: HCNC,CPTII,S$GLB, | Performed by: INTERNAL MEDICINE

## 2024-03-19 PROCEDURE — 1159F MED LIST DOCD IN RCRD: CPT | Mod: HCNC,CPTII,S$GLB, | Performed by: INTERNAL MEDICINE

## 2024-03-19 PROCEDURE — 3074F SYST BP LT 130 MM HG: CPT | Mod: HCNC,CPTII,S$GLB, | Performed by: INTERNAL MEDICINE

## 2024-03-19 PROCEDURE — 99999 PR PBB SHADOW E&M-EST. PATIENT-LVL V: CPT | Mod: PBBFAC,HCNC,, | Performed by: INTERNAL MEDICINE

## 2024-03-19 NOTE — PROGRESS NOTES
Outpatient Care Management   - High Risk Patient Assessment    Patient: Tasha Hawley  MRN:  [id number]  Date of Service:  3/19/2024  Completed by:  Jaycee Caro LMSW  Referral Date: 02/10/2024    Reason for Visit   Patient presents with    Social Work Assessment - High Risk     3/19/2024       Brief Summary:  received a referral from hospital MD for the following patient identified psycho-social needs: caregiver support and possible placement. GERSON familiar with pt from previous OPCM encounters. SW completed social assessment with pt and her , Dangelo. Pt told SW not to speak with her daughter, Lisa due to current conflict in relationship. Pt lives with her  and needs assistance with ADLs. Pt has had an increase in falls at home. Dangelo has been assisting her making breakfast and cleaning house. Pt stated she can clean and dress herself. Pt is using a rollator and wheelchair in the home. Pt denied issues paying for groceries or utilities. Pt reported PCP has ordered portable oxygen for her. Dangelo brings pt to MD appointments. Pt's sister in law, Maria Esther, has been coming over a couple times a week to give Dangelo respite. Pt is able to bear weight on both feet, but is not stable, hence the falls.  Sw has attempted placement for pt in the past, but refused. Per chart review,   reached out to PCP for inpatient rehab orders. GERSON spoke with Osei Chapa   regarding this request. Sammi stated that  SW requested Sammi send referral to [facility] and pt was in agreement.  GERSON had completed his 3 approved visits. Upon extensive chart review, when the patient was inpatient in February, case management attempted diligently to get her placed in a facility. She has many barriers to placement, whether it is SNF or inpatient Rehab. Her barriers are: she left a SNF AMA recently, noncompliance, dilaudid pain pump and she has an open workman's comp claim that  needs a letter proving it has been closed. The family has been made aware of all of this. This SW also notified PCP of the above information and requested SW continue to work on placement for pt. GERSON had an honest conversation with pt and Dangelo regarding long term care planning. Pt is adamantly refusing any kind of nursing home placement. GERSON stated rehab placement can be pursued but it is not guaranteed. GERSON educated patient and  on importance of care in the home for patient. Dangelo stated pt has a hx of not wanting to help herself and do what she needs to do. GERSON discussed sitters, which Dangelo reported they cannot afford. GERSON informed him of the Community Choices Waiver and encouraged him to apply for pt in the morning to get her on the wait list. Gerson verbally gave him the phone number and will send it via patient portal. Dangelo was unable to state if the workman's comp letter was obtained by Lisa and he reported he can't call her due to the conflict. GERSON call and spoke with Regi in admissions at [facility]. She is needing to know when pt's paim pump is due to be refilled, because that was a placement barrier during last hospitalization. Pt stated it will need to be filled 4/2 by pain mngmt MD, not [facility] affiliated. GERSON informed Regi of this information. Regi also requested HH OT orders and rehab orders to be placed in EPIC. GERSON sent message to PCP regarding this. Dangelo agreeable to follow up in a couple of days.  Care plan was created in collaboration with patient/caregiver input.  completed the SDOH questionnaire.

## 2024-03-19 NOTE — PROGRESS NOTES
CHW - Initial Contact    Ms. Michelle Glover RN and Dr. Mesfin Hodges contacted Elayne Garzon, Community Health Worker to assist them with finding a SNF for Ms. Hawley.   Ms. Adalberto and Ms. Duran, contacted [facility] and there were no openings.  Pt stated she was at [facility] but does not want to go back because she wants her own room.  Pt and  stated the beds were too close.  Pt states she wants a portable oxygen tank.   Mr. Hawley stated he would like to be able to visit with his wife. CHW contacted Jaycee Caro LCSW to inform her of status of Mrs. Hawley referral to OPCM. Mrs. Caro will contact Ms. Hawley today.  CHW informed Mr. Dangelo Hawley that Mrs. Caro will follow up with him and Mrs. Hawley.     Case Closure - Due: 3/19/2024

## 2024-03-19 NOTE — PROGRESS NOTES
Subjective:       Patient ID: Tasha Hawley is a 68 y.o. female.    Chief Complaint:   Rash (Arms,back,legs,abdomen )    HPI - The purpose of today's visit was to reconcile medications and pare down her list.  She did not bring the bottles with her, so we cannot do that today.  Home health has recommended inpatient rehab/PT; she agreed reluctantly but does not want to end up chronically in a nursing home. Inpatient rehab with PMR would be beneficial, as physician oversight could help with medication reconciliation and adherence, evaluation for causes of poor gait, coordination of care across multiple specialists, and determination of need for continued intrathecal pump and suprapubic catheter. She has a rash across her arms.  Some inflammation around suprapubic catheter.  She's not walking any more;  says she falls often - about 3 times per day.  He sometimes has a hard time getting her off the floor.  She didn't sleep last night and was intermittently somnolent during the visit this am.      She is compliant with her non-invasive ventilator, which I recommend she continue the use of for respiratory failure     PMH:  Neurogenic bladder, with recurrent UTI's. Followed by urogyn (Milena).  Suprapubic catheter  CAD, with ACS in Jan 2016 - subtot mid LAD lesion without PCI; ischemic CM with EF 40% and chronic LE edema. Followed by [facility]  Chronic insomnia  Lymphedema bilateral LE's  Intermittent nausea  Chronic low back pain with narcotic use.  Indwelling narcotic pump  History CVA with dysarthria and swallowing difficulty  Hypothyroidism  CECI, on CPAP  Anxiety and Depression  Chronic constipation, d/t ongoing narcotic use.  Dependent on home oxygen     Meds: Reviewed and reconciled in EPIC with patient during visit today.    Review of Systems   Constitutional:  Positive for fatigue. Negative for fever.   HENT:  Negative for congestion.    Respiratory:  Positive for shortness of breath.     Cardiovascular:  Positive for leg swelling. Negative for chest pain.   Gastrointestinal:  Negative for abdominal pain.   Genitourinary:  Negative for hematuria.   Musculoskeletal:  Positive for arthralgias.   Skin:  Positive for rash.   Neurological:  Negative for headaches.   Psychiatric/Behavioral:  Positive for sleep disturbance.        Objective:      Physical Exam  Constitutional:       Appearance: She is obese.   HENT:      Head: Normocephalic and atraumatic.   Pulmonary:      Effort: Pulmonary effort is normal.   Musculoskeletal:      Right lower leg: Edema present.      Left lower leg: Edema present.   Skin:     Comments: Multiple AKs across bilateral forearms, L>R   Neurological:      Mental Status: She is alert. Mental status is at baseline.   Psychiatric:         Mood and Affect: Mood normal.         Assessment:       1. Lymphedema of both lower extremities    2. Frequent falls    3. Hemiplegia and hemiparesis following cerebral infarction affecting left non-dominant side    4. Chronic hypoxic respiratory failure    5. Neurogenic bladder    6. Suprapubic catheter    7. Actinic keratoses    8. Chronic pain syndrome    9. Drug-induced constipation    10. S/P insertion of intrathecal pump    11. Cervical myelopathy        Plan:       Tasha was seen today for rash.    Diagnoses and all orders for this visit:    Lymphedema of both lower extremities - stable; limits ambulation.  I'm hoping that inpatient rehab can help.  MD involvement would improve management    Frequent falls - worsening; more frequent now.   cannot manage this at home.  MD involvement in inpatient setting would help figure out causes of falls and plan for recovery    Hemiplegia and hemiparesis following cerebral infarction affecting left non-dominant side - this is another reason for falls.  Needs more aggressive PT    Chronic hypoxic respiratory failure - stable on treatment.  monitor    Neurogenic bladder - stable with catheter.   monitor    Suprapubic catheter - stable.  Continue to monitor.  Inpatient rehab would help coordination of care amongst multiple specialists and to determine need for continued catheterization.    Actinic keratoses - new finding today.  Will need to see derm, but there are too many competing priorities at this time    Chronic pain syndrome - stable on treatment.  Continue present care    Drug-induced constipation - improved today.  monitor    S/P insertion of intrathecal pump - stable.  monitor    Cervical myelopathy - stable right now.       NURSING HOME ORDERS    04/11/2024  [facility]  [address]  [place] [address]  Dept: [phone]     Admit to Nursing Home:      Diagnoses:There are no hospital problems to display for this patient.      Patient is homebound due to:  [unfilled]    Allergies:  Review of patient's allergies indicates:   Allergen Reactions    (d)-limonene flavor      Other reaction(s): difficult intubation  Other reaction(s): Difficulty breathing    Benadryl [diphenhydramine hcl] Shortness Of Breath    Fentanyl Itching, Nausea And Vomiting and Swelling             Imitrex [sumatriptan succinate] Shortness Of Breath    Oxycodone-acetaminophen Shortness Of Breath    Sulfamethoxazole-trimethoprim Anaphylaxis    Topiramate Shortness Of Breath    Vancomycin Anaphylaxis     Rash    Butorphanol tartrate     Evening primrose (oenothera biennis)     Latex     Pregabalin Other (See Comments)     tremors    Propoxyphene n-acetaminophen      Other reaction(s): Difficulty breathing    Sumatriptan     White petrolatum-zinc     Zinc acetate     Zinc oxide-white petrolatum      Other reaction(s): Difficulty breathing    Latex, natural rubber Itching and Rash    Phenytoin Rash and Other (See Comments)     Trouble breathing       Vitals:  Routine    Diet: cardiac diet    Activities:   Ambulate with assistance to bathroom and Activity as  tolerated    Goals of Care Treatment Preferences:  Code Status: Full Code    Health care agent: Dangelo Hawley (spouse)  Health care agent number: [phone]    Living Will: Yes              Labs:  weekly cmp and urinalysis    Nursing Precautions:  Fall and Pressure ulcer prevention    Consults:   PT to evaluate and treat- 5 times a week, OT to evaluate and treat- 3 times a week, and Nutrition to evaluate and recommend diet     Miscellaneous Care: Motley Care: Empty Motley bag every shift.  Change Motley every month  Routine Skin for Bedridden Patients:  Apply moisture barrier cream to all  CHF Care: Daily Weight with notification of MD/NP of 2lb or > increase in 24 hours    v/s and O2 sat every shift    Oxygen as needed for sats <90%    Report abnormal breath sounds to MD/NP    Edema checks q shift- notify MD/NP of increased edema    Task segmentation by nursing for daily care to decrease exertion    CHF education to include diet ,medication, and CHF flags for MD notification                   Diabetes Care:  not diabetic    Medications: Discontinue all previous medication orders, if any. See new list below.     Medication List            Accurate as of March 19, 2024 11:59 PM. If you have any questions, ask your nurse or doctor.                CONTINUE taking these medications      amitriptyline 25 MG tablet  Commonly known as: ELAVIL  Take 25 mg by mouth every evening.     aspirin 81 MG EC tablet  Commonly known as: ECOTRIN  Take 1 tablet (81 mg total) by mouth once daily.     atorvastatin 80 MG tablet  Commonly known as: LIPITOR  Take 1 tablet (80 mg total) by mouth once daily.     butalbital-acetaminophen-caffeine -40 mg -40 mg per tablet  Commonly known as: FIORICET, ESGIC  Take 1 tablet by mouth daily as needed for severe headache     cyanocobalamin (vitamin B-12) 5,000 mcg Subl  Place 1 tablet under the tongue once monthly.     darifenacin 7.5 MG Tb24  Commonly known as: ENABLEX  TAKE ONE  TABLET BY MOUTH EVERY DAY     docusate sodium 100 MG capsule  Commonly known as: COLACE  Take 200 mg by mouth every evening.     DULoxetine 60 MG capsule  Commonly known as: CYMBALTA  Take 1 capsule (60 mg total) by mouth daily.     fludrocortisone 0.1 mg Tab  Commonly known as: FLORINEF  Take a half-tablet (50 mcg) by mouth once daily     fluticasone propionate 50 mcg/actuation nasal spray  Commonly known as: FLONASE  2 sprays (100 mcg total) by Each Nostril route once daily.     furosemide 40 MG tablet  Commonly known as: LASIX  Take 2 tablets (80 mg total) by mouth 2 (two) times a day.     HYDROcodone-acetaminophen  mg per tablet  Commonly known as: NORCO  Take 1 tablet by mouth every 6 (six) hours as needed for pain.     hydrocortisone 10 MG Tab  Commonly known as: CORTEF  Take 1 tablet (10 mg total) by mouth every evening.     hydrOXYzine 50 MG tablet  Commonly known as: ATARAX  Take 1 tablet (50 mg total) by mouth every 4 (four) hours as needed for Anxiety.     INTRATHECAL PAIN PUMP COMPOUND  Hydromorphone (7.5 mg/mL) infusion at 8.59 mg/day (0.3578 mg/hr) out of a total reservoir volume of 40 mL  Pump filled monthly     levothyroxine 150 MCG tablet  Commonly known as: SYNTHROID  Take 1 tablet (150 mcg total) by mouth before breakfast.          linaCLOtide 72 mcg Cap capsule  Commonly known as: LINZESS  Take 1 capsule (72 mcg total) by mouth before breakfast.     nitroGLYCERIN 0.4 MG SL tablet  Commonly known as: NITROSTAT  Place 0.4 mg under the tongue every 5 (five) minutes as needed for Chest pain.     ondansetron 4 MG Tbdl  Commonly known as: ZOFRAN-ODT  Take 4 mg by mouth every 4 to 6 hours as needed for nausea.     OYSTER SHELL CALCIUM-VIT D3 500 mg-5 mcg (200 unit) per tablet  Generic drug: calcium-vitamin D3  Take 2 tablets by mouth 2 (two) times daily.     pantoprazole 40 MG tablet  Commonly known as: PROTONIX  Take 1 tablet (40 mg total) by mouth once daily.     potassium chloride 10 MEQ  Cpsr  Commonly known as: MICRO-K  Take 2 capsules (20 mEq total) by mouth once daily.     promethazine 25 MG tablet  Commonly known as: PHENERGAN  Take 25 mg by mouth every 6 (six) hours as needed for Nausea.     QUEtiapine 100 MG Tab  Commonly known as: SEROQUEL  TAKE 1 TABLET (100 MG) BY MOUTH NIGHTLY     senna 8.6 mg tablet  Commonly known as: SENNA LAX  Take 2 tablets by mouth nightly.     SPIRIVA RESPIMAT 2.5 mcg/actuation inhaler  Generic drug: tiotropium bromide  Inhale 2 puffs into the lungs once daily. Controller     traMADoL 50 mg tablet  Commonly known as: ULTRAM  Take 1 tablet (50 mg total) by mouth every 4 (four) hours as needed for Pain.     traZODone 300 MG tablet  Commonly known as: DESYREL  Take 0.5 tablets (150 mg total) by mouth every evening.                     Immunizations Administered as of 3/19/2024       Name Date Dose VIS Date Route Exp Date    COVID-19, MRNA, LN-S, PF (Pfizer) (Purple Cap) 4/23/2021 10:56 AM 0.3 mL 12/12/2020 Intramuscular 7/31/2021    Site: Right deltoid     Given By: Mirna Milner     : Pf[employer] Inc     Lot: [id number]     COVID-19, MRNA, LN-S, PF (Pfizer) (Purple Cap) 4/2/2021 12:39 PM 0.3 mL 12/12/2020 Intramuscular 7/31/2021    Site: Right deltoid     Given By: Juan Day MA     : Pfizer Inc     Lot: [id number]             This patient has had both covid vaccinations    Some patients may experience side effects after vaccination.  These may include fever, headache, muscle or joint aches.  Most symptoms resolve with 24-48 hours and do not require urgent medical evaluation unless they persist for more than 72 hours or symptoms are concerning for an unrelated medical condition.          _________________________________  Mesfin Hodges Ii, MD  04/11/2024           Rtc prn, or in a month WITH ALL PILL BOTTLES    PAPO Hodges MD MPH  Staff Internist

## 2024-03-20 ENCOUNTER — TELEPHONE (OUTPATIENT)
Dept: INTERNAL MEDICINE | Facility: CLINIC | Age: 69
End: 2024-03-20
Payer: MEDICARE

## 2024-03-20 DIAGNOSIS — R29.6 FREQUENT FALLS: ICD-10-CM

## 2024-03-20 DIAGNOSIS — I89.0 LYMPHEDEMA OF BOTH LOWER EXTREMITIES: Primary | ICD-10-CM

## 2024-03-20 DIAGNOSIS — J96.12 CHRONIC RESPIRATORY FAILURE WITH HYPOXIA AND HYPERCAPNIA: ICD-10-CM

## 2024-03-20 DIAGNOSIS — I69.354 HEMIPLEGIA AND HEMIPARESIS FOLLOWING CEREBRAL INFARCTION AFFECTING LEFT NON-DOMINANT SIDE: ICD-10-CM

## 2024-03-20 DIAGNOSIS — J96.11 CHRONIC RESPIRATORY FAILURE WITH HYPOXIA AND HYPERCAPNIA: ICD-10-CM

## 2024-03-20 NOTE — TELEPHONE ENCOUNTER
----- Message from Jaycee Caro LMSW sent at 3/19/2024  2:36 PM CDT -----  Regarding: RE: Placement  You can place an ambulatory referral to Physical Medicine and Rehab and reason is evaluation for inpatient rehab and Taurushajeanmarie can pull those orders from [facility]. Can you also order OT for HH? Rehab is requesting this because she will need 2 disciplines at rehab. They also requested you add documentation from your visit today with patient supporting why patient would need physician oversight 3x/week (what pt will get at rehab). This documentation will be forwarded to insurance for review for approval.   Thank you!    ----- Message -----  From: Mesfin Hodges II, MD  Sent: 3/19/2024   1:41 PM CDT  To: Michelle Glover RN; Celeste Ellis MA; #  Subject: RE: Placement                                    I am happy to put in those orders, but I have never done that and don't know the order set.  I tried to search for it in EPIC, but could not find it.  Do you know the name of the order?    D  ----- Message -----  From: Jaycee Caro LMSW  Sent: 3/19/2024  12:07 PM CDT  To: Michelle Glover RN; Mesfin Hodges II, MD; #  Subject: RE: Placement                                    Elayne Pace is an extremely valuable team member to the [facility] and I would never undervalue her. Placing patients in facilities is out of her scope of practice.   I have spoken to her home health  who informed me she requested inpatient rehab orders. Can those be placed in [facility] so [facility] can review, please?    ----- Message -----  From: Mesfin Hodges II, MD  Sent: 3/19/2024  11:46 AM CDT  To: Michelle Glover RN; Celeste Ellis MA; #  Subject: RE: Placement                                    Jaycee,  I understand that Ms Hawley is complicated medically and difficult socially. Elayne was very helpful today and supportive to the family; I would not undervalue her assistance.      This is exactly the type of patient who would benefit from placement.  I hope you can continue to help her and the family get this arranged.    Regards,  Dr. Hodges  ----- Message -----  From: Celeste Ellis MA  Sent: 3/19/2024  11:15 AM CDT  To: Mesfin Hodges II, MD  Subject: FW: Placement                                      ----- Message -----  From: Jaycee Caro LMSW  Sent: 3/19/2024  11:00 AM CDT  To: Carroll Toure Staff  Subject: Placement                                        Hi!    I am the  that will be working with this patient for Outpatient Case Management. I see you have reached out to our CHW, Elayne, but this is out of her scope of practice. I have had this patient in the past for placement, which the patient ultimately declined. Upon extensive chart review, when the patient was inpatient in February, case management attempted diligently to get her placed in a facility. She has many barriers to placement, whether it is SNF or inpatient Rehab. Her barriers are: she left a SNF AMA recently, noncompliance, dilaudid pain pump and she has an open workman's comp claim that needs a letter proving it has been closed. The family has been made aware of all of this. I will work with the family in regards to support in the home and long term care planning. Please let me know if you have any questions!    Jaycee Caro LMSW  Outpatient Care Management

## 2024-03-22 ENCOUNTER — OUTPATIENT CASE MANAGEMENT (OUTPATIENT)
Dept: ADMINISTRATIVE | Facility: OTHER | Age: 69
End: 2024-03-22
Payer: MEDICARE

## 2024-03-22 ENCOUNTER — TELEPHONE (OUTPATIENT)
Dept: INTERNAL MEDICINE | Facility: CLINIC | Age: 69
End: 2024-03-22
Payer: MEDICARE

## 2024-03-22 RX ORDER — HYDROCORTISONE 10 MG/1
10 TABLET ORAL NIGHTLY
Qty: 30 TABLET | Refills: 2 | Status: SHIPPED | OUTPATIENT
Start: 2024-03-22 | End: 2024-05-02 | Stop reason: SDUPTHER

## 2024-03-22 NOTE — PROGRESS NOTES
"Outpatient Care Management   - Care Plan Follow Up    Patient: Tasha Hawley  MRN:  [id number]  Date of Service:  3/22/2024  Completed by:  Jaycee Caro LMSW  Referral Date: 02/10/2024    Reason for Visit   Patient presents with    OPCM GERSON Follow Up Call     3/22/2024       Brief Summary: SW followed up with pt via telephone. She said she was doing "so-so". GERSON asked if pt had been in contact with Regi or know if she applied for the [employer] Waiver. Pt stated she had not heard from Regi at [facility] and did not know about the waiver. SW asked to speak to her , Dangelo. She went to look for him but a family friend present at the home reported he was sleeping. GERSON left message requesting he call her back.  GERSON in close contact with Regi at [facility]. She stated she was calling pt to schedule home visit today. SW will continue to follow this. Pt agreeable to follow up next week.     Complex Care Plan     Care plan was discussed and completed today with input from patient and/or caregiver.    Patient Instructions     No follow-ups on file.    "

## 2024-03-22 NOTE — TELEPHONE ENCOUNTER
Spoke to patient she states she just wanted to check on the portable tank. I faxed that order already but I told  I'd refax it.

## 2024-03-22 NOTE — TELEPHONE ENCOUNTER
----- Message from Charla Clark sent at 3/22/2024 12:31 PM CDT -----  Contact: [phone]  1MEDICALADVICE     Patient is calling for Medical Advice regarding:pt said she needs a call back she has some question     How long has patient had these symptoms:    Pharmacy name and phone#:    Would like response via Savalanchehart:  call back     Comments:

## 2024-03-27 ENCOUNTER — TELEPHONE (OUTPATIENT)
Dept: INTERNAL MEDICINE | Facility: CLINIC | Age: 69
End: 2024-03-27
Payer: MEDICARE

## 2024-03-27 ENCOUNTER — OUTPATIENT CASE MANAGEMENT (OUTPATIENT)
Dept: ADMINISTRATIVE | Facility: OTHER | Age: 69
End: 2024-03-27
Payer: MEDICARE

## 2024-03-27 NOTE — TELEPHONE ENCOUNTER
----- Message from Diana Giraldo sent at 3/26/2024  2:54 PM CDT -----  Contact: [phone]  1MEDICALADVICE     Patient is calling for Medical Advice regarding: pt wants a call back about a pre op     How long has patient had these symptoms:    Pharmacy name and phone#:    Would like response via Teracenthart: call back     Comments:

## 2024-03-27 NOTE — PROGRESS NOTES
"Outpatient Care Management   - Care Plan Follow Up    Patient: Tasha Hawley  MRN:  [id number]  Date of Service:  3/27/2024  Completed by:  Jaycee Caro LMSW  Referral Date: 02/10/2024    Reason for Visit   Patient presents with    OPCM GERSON Follow Up Call     3/26/2024 and 3/27/2024       Brief Summary: GERSON followed up with pt via telephone on 3/26. Pt had an in person visit with Regi from [facility] the day before and was waiting on the MD decision. GERSON received voicemail from pt's daughter, antonio, inquiring about placement for pt. GERSON called pt on 3/27 and got verbal permission from pt to speak with Antonio regarding placement. Pt stated Regi had called her and the rehab MD "just responded and feel he does not have enough from a medical standpoint to support inpatient rehab". Pt stated she was upset about it. Pt stated she was agreeable to SNF placement in a nursing home. GERSON made it clear to pt on what that means when she asked if she was going to be with "old people". GERSON then asked pt's  if he ever called to apply for CCW and he said no. GERSON verbally gave the phone number again. GERSON then called Antonoi and had an extensive productive conversation regarding placement for pt. She said she was able to obtain the worker's comp letter of closure. She believes the confusion is that the settlement was paid out with a "set aside" for pt's lumbar issues for $6000/year. This money can only be used towards pt's lumbar medical issues and is usually all spent by pt's pain management team for her pain pump. Antonio also stated pt's pain MD works with a company called [employer] that goes into [facility] nursing homes so this may be the best way for a facility to accept pt with her pain pump. GERSON and Antonio then 3 way conference called pt to further discuss her agreeance to SNF placement. Pt stated she is agreeable to it.  GERSON and Antonio discussed CCW. GERSON will email facts sheet to Antonio and she will call it in. GERSON will work on " sending SNF referrals. Lisa agreeable to follow up next week.     Complex Care Plan     Care plan was discussed and completed today with input from patient and/or caregiver.    Patient Instructions     No follow-ups on file.

## 2024-03-29 ENCOUNTER — NURSE TRIAGE (OUTPATIENT)
Dept: ADMINISTRATIVE | Facility: CLINIC | Age: 69
End: 2024-03-29
Payer: MEDICARE

## 2024-03-29 NOTE — TELEPHONE ENCOUNTER
Reason for Disposition   Recent back or abdominal injury    Additional Information   Negative: Shock suspected (e.g., cold/pale/clammy skin, too weak to stand, low BP, rapid pulse)   Negative: Sounds like a life-threatening emergency to the triager    Protocols used: Urine - Blood In-A-AH  Pt states she fell this week and injured her back. States she has blood in her urine and believes she has a very bad UTI. Pt advised per Triage protocol to have someone bring her to the ED for evaluation. Instructed to call 911 if no one can bring her. Pt verbalized understanding.

## 2024-04-01 ENCOUNTER — PATIENT MESSAGE (OUTPATIENT)
Dept: INTERNAL MEDICINE | Facility: CLINIC | Age: 69
End: 2024-04-01
Payer: MEDICARE

## 2024-04-03 ENCOUNTER — TELEPHONE (OUTPATIENT)
Dept: INTERNAL MEDICINE | Facility: CLINIC | Age: 69
End: 2024-04-03
Payer: MEDICARE

## 2024-04-03 ENCOUNTER — TELEPHONE (OUTPATIENT)
Dept: UROLOGY | Facility: CLINIC | Age: 69
End: 2024-04-03
Payer: MEDICARE

## 2024-04-03 NOTE — TELEPHONE ENCOUNTER
Pt c/o leaking urine. Is wearing 2 diapers. NO urine hardly going into the bag. States she has gross hematuria with clots. States she been falling a lot, hit her head x2 today d/t fall. No n/v/f/c. No longer has HH services. Advised pt to head to ER, she declined, stating she may try UC. Reiterated that she needs to be evaluated d/t gross hematuria w/ clots and the frequency of falls. Notified that she is scheduled for botox injections of the bladder on 4/23/24, however, the procedure cannot be done if pt is infected. Pt v/u.

## 2024-04-03 NOTE — TELEPHONE ENCOUNTER
----- Message from Gregory Silva sent at 4/3/2024  4:44 PM CDT -----  Regarding: Nurse Call requested  Contact: [phone]  Hi, pt called to request a call from the nurse to discuss the pt's  urine and her bag. Pls call the pt at  [phone] to discuss. Pt says she is passing Blood clots.   Thank you.

## 2024-04-03 NOTE — TELEPHONE ENCOUNTER
----- Message from Mitzi Bryson sent at 4/3/2024  2:49 PM CDT -----  Contact: Delmy/[facility] / [phone]  1MEDICALADVICE     Patient is calling for Medical Advice regarding: Sherri with [facility]  is calling to report that the patient fell twice last night in the bathroom and also around 6 am in the laundry room.Oncology all the occasions patient hit her head and her back and her  had to pick her up from the bathroom and a friend of hers who is a  picked her up from the laundry room. Patient also reported falling 5 times the day before . Patient didn't seek any medical attention after the falls, no bruises or anything noticeable.       Comments:

## 2024-04-03 NOTE — TELEPHONE ENCOUNTER
----- Message from Konrad Gambino sent at 4/3/2024 11:52 AM CDT -----  Contact: [phone] Karine  Type: Returning a call    Who left a message? [facility]     When did the practice call? 04/03/2024    Comments: Peer to Peer call is needed for pt to continue home health services

## 2024-04-04 ENCOUNTER — OUTPATIENT CASE MANAGEMENT (OUTPATIENT)
Dept: ADMINISTRATIVE | Facility: OTHER | Age: 69
End: 2024-04-04
Payer: MEDICARE

## 2024-04-04 NOTE — PROGRESS NOTES
Outpatient Care Management   - Care Plan Follow Up    Patient: Tasha Hawley  MRN:  [id number]  Date of Service:  4/4/2024  Completed by:  Jaycee Caro LMSW  Referral Date: 02/10/2024    Reason for Visit   Patient presents with    OPCM SW Follow Up Call     4/4/2024       Brief Summary: GERSON spoke with Patricia at [facility] ([phone]) regarding bed availability. She has one available and will have 2 more next week. Patricia requested packet be sent to her for review.  GERSON left message for Karine at [facility] ([phone]) to request home health clinics for facility review. GERSON faxed referral packet to [facility] ([phone]). GERSON spoke with Estiven at [facility] ([phone]). Estiven familiar with pt's case. Estiven requested packet be sent to her via fax [phone]. GERSON then called [facility] again since message was not returned. GERSON spoke with Karine who reported she will send  clinicals to  to give to facilities. GERSON updated pt's daughter, Lisa, of the above via email.     Complex Care Plan     Care plan was discussed and completed today with input from patient and/or caregiver.    Patient Instructions     No follow-ups on file.

## 2024-04-04 NOTE — TELEPHONE ENCOUNTER
Please call Mitzi at home health.  I am aware of her frequent falls.  Pt refuses to go to a different living situation, and there's nothing else I can do about the falls.  If she injures herself in a fall, family and patient know to go to the ER.

## 2024-04-05 NOTE — TELEPHONE ENCOUNTER
I got this message earlier this week.  I'm hesitant to try to get more outpatient PT, as she really needs inpatient.  I know you're working on it - Please let me know if I can help.    Dr. ALLAN

## 2024-04-08 ENCOUNTER — OUTPATIENT CASE MANAGEMENT (OUTPATIENT)
Dept: ADMINISTRATIVE | Facility: OTHER | Age: 69
End: 2024-04-08
Payer: MEDICARE

## 2024-04-08 ENCOUNTER — TELEPHONE (OUTPATIENT)
Dept: INTERNAL MEDICINE | Facility: CLINIC | Age: 69
End: 2024-04-08
Payer: MEDICARE

## 2024-04-08 ENCOUNTER — TELEPHONE (OUTPATIENT)
Dept: UROLOGY | Facility: CLINIC | Age: 69
End: 2024-04-08
Payer: MEDICARE

## 2024-04-08 PROBLEM — J96.12 CHRONIC RESPIRATORY FAILURE WITH HYPOXIA AND HYPERCAPNIA: Status: RESOLVED | Noted: 2024-01-05 | Resolved: 2024-04-08

## 2024-04-08 PROBLEM — J96.11 CHRONIC RESPIRATORY FAILURE WITH HYPOXIA AND HYPERCAPNIA: Status: RESOLVED | Noted: 2024-01-05 | Resolved: 2024-04-08

## 2024-04-08 NOTE — TELEPHONE ENCOUNTER
I called and spoke wit the pt, and she said it has been three weeks, and she still has not received her portable oxygen machine, and would like to know what to do.

## 2024-04-08 NOTE — TELEPHONE ENCOUNTER
----- Message from Elida Saenz sent at 4/8/2024 11:35 AM CDT -----  Contact: [phone]  Would like to receive medical advice.  Pt called and stated that she checking the status of her portable oxygen machine. Please call to advise.     Would they like a call back or a response via [facility]:  call    Additional information:  Please call to advise.

## 2024-04-08 NOTE — TELEPHONE ENCOUNTER
----- Message from Elida Saenz sent at 4/8/2024 11:38 AM CDT -----  Contact: [phone]  Would like to receive medical advice.  Pt would like a call back.      Would they like a call back or a response via [facility]:  call    Additional information:  Please call to advise.

## 2024-04-08 NOTE — PROGRESS NOTES
"Outpatient Care Management   - Care Plan Follow Up    Patient: Tasha Hawley  MRN:  [id number]  Date of Service:  4/8/2024  Completed by:  Jaycee Caro LMSW  Referral Date: 02/10/2024    Reason for Visit   Patient presents with    OPCM SW Follow Up Call     4/8/2024       Brief Summary: GERSON called and spoke with Bryant (covering for Karine) at [facility] on 4//5/2024. Karine with  sent SW incorrect clinicals and was out. GERSON explained to Bryant the requested clinicals needed and stated he will send them. On 4/8/2024, clinicals still not received. GERSON sent email to pt's standing  Sammi, who was back at work, requesting clinicals. Clinicals are still pending. GERSON received email from pt's daughter Lisa, stating  had discharged her and requesting SW find her a different agency. GERSON inquired about this to Sammi at . Per Sammi, " Her last visit was Saturday. [facility] is denying several home health visits made and appears she has been noncompliant with multiple missed visits and scheduled times and not following medical advice." GERSON will send home health clinicals to Estiven at [facility] once they are received. GERSON updated pt's daughter on this information, letting her know SW cannot get another  agency for patient due to insurance and noncompliance barriers. GERSON will continue to follow this case closely.     Complex Care Plan     Care plan was discussed and completed today with input from patient and/or caregiver.    Patient Instructions     No follow-ups on file.    "

## 2024-04-09 ENCOUNTER — DOCUMENT SCAN (OUTPATIENT)
Dept: HOME HEALTH SERVICES | Facility: HOSPITAL | Age: 69
End: 2024-04-09
Payer: MEDICARE

## 2024-04-09 ENCOUNTER — TELEPHONE (OUTPATIENT)
Dept: INTERNAL MEDICINE | Facility: CLINIC | Age: 69
End: 2024-04-09
Payer: MEDICARE

## 2024-04-09 NOTE — TELEPHONE ENCOUNTER
Tried contacting pt to see exactly what she's missing from her oxygen supplies so that order is put in. No answer and voicemail box is full.    Josh TREVIZO

## 2024-04-09 NOTE — TELEPHONE ENCOUNTER
----- Message from Néstorscooter Llamas sent at 4/8/2024  3:44 PM CDT -----  Contact: Self [phone]  Would like to receive medical advice.    Would they like a call back or a response via [facility]:  call back     Additional information:  calling to get the whole kit for the portable oxygen tank.she states that's there is some pieces missing.

## 2024-04-10 ENCOUNTER — TELEPHONE (OUTPATIENT)
Dept: INTERNAL MEDICINE | Facility: CLINIC | Age: 69
End: 2024-04-10
Payer: MEDICARE

## 2024-04-10 ENCOUNTER — DOCUMENT SCAN (OUTPATIENT)
Dept: HOME HEALTH SERVICES | Facility: HOSPITAL | Age: 69
End: 2024-04-10
Payer: MEDICARE

## 2024-04-10 RX ORDER — FUROSEMIDE 40 MG/1
40 TABLET ORAL
Qty: 90 TABLET | Refills: 3 | OUTPATIENT
Start: 2024-04-10

## 2024-04-10 NOTE — TELEPHONE ENCOUNTER
----- Message from Karla Barboza sent at 4/10/2024 10:21 AM CDT -----  Contact: [phone]  Patient called, requested a courtesy call from Dr Hodges's office- did not specify. Thank you.

## 2024-04-10 NOTE — TELEPHONE ENCOUNTER
No care due was identified.  [facility] Embedded Care Due Messages. Reference number: [id number].   4/10/2024 12:54:19 PM CDT

## 2024-04-11 ENCOUNTER — OFFICE VISIT (OUTPATIENT)
Dept: UROLOGY | Facility: CLINIC | Age: 69
End: 2024-04-11
Payer: MEDICARE

## 2024-04-11 ENCOUNTER — TELEPHONE (OUTPATIENT)
Dept: INTERNAL MEDICINE | Facility: CLINIC | Age: 69
End: 2024-04-11
Payer: MEDICARE

## 2024-04-11 ENCOUNTER — OUTPATIENT CASE MANAGEMENT (OUTPATIENT)
Dept: ADMINISTRATIVE | Facility: OTHER | Age: 69
End: 2024-04-11
Payer: MEDICARE

## 2024-04-11 VITALS
HEART RATE: 68 BPM | BODY MASS INDEX: 38.98 KG/M2 | WEIGHT: 233.94 LBS | HEIGHT: 65 IN | SYSTOLIC BLOOD PRESSURE: 110 MMHG | DIASTOLIC BLOOD PRESSURE: 61 MMHG

## 2024-04-11 DIAGNOSIS — N39.46 MIXED STRESS AND URGE URINARY INCONTINENCE: ICD-10-CM

## 2024-04-11 DIAGNOSIS — M79.671 PAIN IN RIGHT FOOT: ICD-10-CM

## 2024-04-11 DIAGNOSIS — S92.901G CLOSED MULTIPLE FRACTURES OF RIGHT FOOT WITH DELAYED HEALING, SUBSEQUENT ENCOUNTER: ICD-10-CM

## 2024-04-11 DIAGNOSIS — Z93.59 SUPRAPUBIC CATHETER: ICD-10-CM

## 2024-04-11 DIAGNOSIS — N32.81 DETRUSOR OVERACTIVITY: ICD-10-CM

## 2024-04-11 DIAGNOSIS — N31.9 NEUROGENIC BLADDER: Primary | ICD-10-CM

## 2024-04-11 DIAGNOSIS — G47.01 INSOMNIA DUE TO MEDICAL CONDITION: Chronic | ICD-10-CM

## 2024-04-11 PROCEDURE — 1126F AMNT PAIN NOTED NONE PRSNT: CPT | Mod: HCNC,CPTII,S$GLB,

## 2024-04-11 PROCEDURE — 99214 OFFICE O/P EST MOD 30 MIN: CPT | Mod: HCNC,25,S$GLB,

## 2024-04-11 PROCEDURE — 1159F MED LIST DOCD IN RCRD: CPT | Mod: HCNC,CPTII,S$GLB,

## 2024-04-11 PROCEDURE — 87086 URINE CULTURE/COLONY COUNT: CPT | Mod: HCNC

## 2024-04-11 PROCEDURE — 87106 FUNGI IDENTIFICATION YEAST: CPT | Mod: HCNC

## 2024-04-11 PROCEDURE — 51705 CHANGE OF BLADDER TUBE: CPT | Mod: HCNC,S$GLB,,

## 2024-04-11 PROCEDURE — 3078F DIAST BP <80 MM HG: CPT | Mod: HCNC,CPTII,S$GLB,

## 2024-04-11 PROCEDURE — 99999 PR PBB SHADOW E&M-EST. PATIENT-LVL III: CPT | Mod: PBBFAC,HCNC,,

## 2024-04-11 PROCEDURE — 87088 URINE BACTERIA CULTURE: CPT | Mod: HCNC

## 2024-04-11 PROCEDURE — 3008F BODY MASS INDEX DOCD: CPT | Mod: HCNC,CPTII,S$GLB,

## 2024-04-11 PROCEDURE — 3074F SYST BP LT 130 MM HG: CPT | Mod: HCNC,CPTII,S$GLB,

## 2024-04-11 PROCEDURE — 1160F RVW MEDS BY RX/DR IN RCRD: CPT | Mod: HCNC,CPTII,S$GLB,

## 2024-04-11 NOTE — TELEPHONE ENCOUNTER
----- Message from Jaycee Caro LMSW sent at 4/11/2024  2:23 PM CDT -----  Regarding: RE: Placement  Sure! You can actually put them in a progress note and use the dot phrase .briannaorders and the template will populate.   ----- Message -----  From: Mesfin Hodges II, MD  Sent: 4/11/2024   1:55 PM CDT  To: Michelle Glover RN; Celeste Ellis MA; #  Subject: RE: Placement                                    Jaycee,  I'd love to do this, but that order isn't available for me in EPIC.  I get skilled nursing, but that's for in-home care.  Can you tell me the exact name of the order?  Maybe I can find it that way.    Dr. ALLAN  ----- Message -----  From: Jaycee Caro LMSW  Sent: 4/11/2024  11:08 AM CDT  To: Mesfin Hodges II, MD  Subject: RE: Placement                                    Dr. Hodges,    Can you please place Skilled Nursing Home Orders in EPIC? Make sure to include: admit to SNF nursing home care, diet order, oxygen, meds, etc.  Pt's insurance approved for [facility].   I will let you know if the facility needs anything else!    ----- Message -----  From: Mesfin Hodges II, MD  Sent: 3/20/2024  10:45 AM CDT  To: Michelle Glover RN; Celeste Ellis MA; #  Subject: RE: Placement                                    Okay.  I did these things you requested.  Please let me know if you need anything more.    Dr. ALLAN  ----- Message -----  From: Jaycee Caro LMSW  Sent: 3/19/2024   2:45 PM CDT  To: Michelle Glover RN; Mesfin Hodges II, MD; #  Subject: RE: Placement                                    You can place an ambulatory referral to [facility] and reason is evaluation for inpatient rehab and [facility] can pull those orders from [facility]. Can you also order OT for HH? Rehab is requesting this because she will need 2 disciplines at rehab. They also requested you add documentation from your visit today with patient supporting why patient  would need physician oversight 3x/week (what pt will get at rehab). This documentation will be forwarded to insurance for review for approval.   Thank you!    ----- Message -----  From: Mesfin Hodges II, MD  Sent: 3/19/2024   1:41 PM CDT  To: Michelle Glover RN; Celeste Ellis MA; #  Subject: RE: Placement                                    I am happy to put in those orders, but I have never done that and don't know the order set.  I tried to search for it in EPIC, but could not find it.  Do you know the name of the order?    D  ----- Message -----  From: Jaycee Caro LMSW  Sent: 3/19/2024  12:07 PM CDT  To: Michelle Glover RN; Mesfin Hodges II, MD; #  Subject: RE: Placement                                    Elayne Pace is an extremely valuable team member to the [facility] and I would never undervalue her. Placing patients in facilities is out of her scope of practice.   I have spoken to her home health  who informed me she requested inpatient rehab orders. Can those be placed in [facility] so [facility] can review, please?    ----- Message -----  From: Mesfin Hodges II, MD  Sent: 3/19/2024  11:46 AM CDT  To: Michelle Glover RN; Celeste Ellis MA; #  Subject: RE: Placement                                    Jaycee,  I understand that Ms Hawley is complicated medically and difficult socially. Elayne was very helpful today and supportive to the family; I would not undervalue her assistance.     This is exactly the type of patient who would benefit from placement.  I hope you can continue to help her and the family get this arranged.    Regards,  Dr. Hodges  ----- Message -----  From: Celeste Ellis MA  Sent: 3/19/2024  11:15 AM CDT  To: Mesfin Hodges II, MD  Subject: FW: Placement                                      ----- Message -----  From: Jaycee Caro LMSW  Sent: 3/19/2024  11:00 AM CDT  To: [facility] Staff  Subject:  Placement                                        Hi!    I am the  that will be working with this patient for Outpatient Case Management. I see you have reached out to our CHW, Elayne, but this is out of her scope of practice. I have had this patient in the past for placement, which the patient ultimately declined. Upon extensive chart review, when the patient was inpatient in February, case management attempted diligently to get her placed in a facility. She has many barriers to placement, whether it is SNF or inpatient Rehab. Her barriers are: she left a SNF AMA recently, noncompliance, dilaudid pain pump and she has an open workman's comp claim that needs a letter proving it has been closed. The family has been made aware of all of this. I will work with the family in regards to support in the home and long term care planning. Please let me know if you have any questions!    Jaycee Caro, GERSON  Outpatient Care Management

## 2024-04-11 NOTE — PROGRESS NOTES
Outpatient Care Management   - Care Plan Follow Up    Patient: Tasha Hawley  MRN:  [id number]  Date of Service:  4/11/2024  Completed by:  Jaycee Caro LMSW  Referral Date: 02/10/2024    Reason for Visit   Patient presents with    OPCM GERSON Follow Up Call     4/11/2024       Brief Summary: GERSON followed up with Estiven at [facility]. She reported she had received authorization from [facility] and it will be good for 5 days. GERSON called pt's daughter, Lisa, to request she call in the LOCET so SW can fax completed PASRR. Lisa called back and stated [facility] told her there was a current LOCET on file. GERSON called [facility] and was notified that there is a current LOCET, but an updated PASRR was needed. GERSON faxed PASRR and received the 142. GERSON in basket messaged pt's PCP for SNF orders. Pending SNF orders now. [facility] in contact with Lisa for admissions paperwork. Admit to SNF pending for tomorrow. GERSON to continue following closely.    Complex Care Plan     Care plan was discussed and completed today with input from patient and/or caregiver.    Patient Instructions     No follow-ups on file.

## 2024-04-11 NOTE — PROGRESS NOTES
CHIEF COMPLAINT:  SPT change with culture      HISTORY OF PRESENTING ILLINESS:  Tasha Hawley is a 68 y.o. female who is presents with a history of neurogenic bladder with incomplete emptying managed with a suprapubic tube. Presents today for SPT change with culture prior to cysto with Botox scheduled with Dr. Mayo 4/23/24.      She was last injected with Botox by Dr. Mayo in 11/2023.   She states she has return of incontinence despite SP drainage.      Her injection was postponed due to need for cardiology evaluation. She has significant pulmonary and cardiovascular comorbidities.      Past medical, family, surgical and social history reviewed as documented in chart with pertinent positive medical, family, surgical and social history detailed in HPI.       REVIEW OF SYSTEMS:  Review of Systems   Constitutional:  Negative for chills and fever.   HENT:  Positive for hearing loss. Negative for congestion and sore throat.    Respiratory:  Negative for cough and shortness of breath.    Cardiovascular:  Negative for chest pain and palpitations.   Gastrointestinal:  Negative for nausea and vomiting.   Genitourinary:  Negative for flank pain and hematuria.        SPT present    Neurological:  Negative for dizziness and headaches.         PATIENT HISTORY:    Past Medical History:   Diagnosis Date    Anxiety     Arthritis     Bilateral lower extremity edema     severe chronic    Carotid artery occlusion     Cataract     CHF (congestive heart failure)     Coronary artery disease     subtotalled LAD with collateral    Depression     Fever blister     Hard of hearing     Hypokalemia 01/09/2023    Hyponatremia 02/04/2022    Hypothyroid     Iron deficiency anemia     Lumbar radiculopathy     with chronic pain    Ocular migraine     Other emphysema 05/22/2023    Renal disorder     Sleep apnea     cpap       Past Surgical History:   Procedure Laterality Date    ADENOIDECTOMY      BACK SURGERY      x 12    CARDIAC  CATHETERIZATION  2016    subtotalled LAD with right to left collaterals    CATARACT EXTRACTION W/  INTRAOCULAR LENS IMPLANT Left     Dr Coleman     CYSTOSCOPIC LITHOLAPAXY N/A 6/27/2019    Procedure: CYSTOLITHOLAPAXY;  Surgeon: Shireen Mayo MD;  Location: [facility] OR 2ND FLR;  Service: Urology;  Laterality: N/A;    CYSTOSCOPIC LITHOLAPAXY N/A 9/3/2019    Procedure: CYSTOLITHOLAPAXY;  Surgeon: Shireen Mayo MD;  Location: [facility] OR 2ND FLR;  Service: Urology;  Laterality: N/A;    CYSTOSCOPY N/A 7/13/2021    Procedure: CYSTOSCOPY;  Surgeon: Shireen Mayo MD;  Location: [facility] OR 1ST FLR;  Service: Urology;  Laterality: N/A;    CYSTOSCOPY  11/16/2021    Procedure: CYSTOSCOPY;  Surgeon: Shireen Mayo MD;  [facility];  Service: Urology;;    CYSTOSCOPY  7/19/2022    Procedure: CYSTOSCOPY;  Surgeon: Shireen Mayo MD;  [facility];  Service: Urology;;    CYSTOSCOPY WITH INJECTION OF PERIURETHRAL BULKING AGENT  7/19/2022    Procedure: CYSTOSCOPY, WITH PERIURETHRAL BULKING AGENT INJECTION-MACROPLASTIQUE;  Surgeon: Shireen Mayo MD;  [facility];  Service: Urology;;    CYSTOSCOPY WITH INJECTION OF PERIURETHRAL BULKING AGENT N/A 3/28/2023    Procedure: CYSTOSCOPY, WITH PERIURETHRAL BULKING AGENT INJECTION;  Surgeon: Shireen Mayo MD;  [facility];  Service: Urology;  Laterality: N/A;  [place]    CYSTOSCOPY WITH INJECTION OF PERIURETHRAL BULKING AGENT N/A 11/14/2023    Procedure: CYSTOSCOPY, WITH PERIURETHRAL BULKING AGENT INJECTION;  Surgeon: Shireen Mayo MD;  [facility];  Service: Urology;  Laterality: N/A;    CYSTOSCOPY,WITH BOTULINUM TOXIN INJECTION N/A 12/13/2022    Procedure: CYSTOSCOPY,WITH BOTULINUM TOXIN INJECTION;  Surgeon: Shireen Mayo MD;  [facility];  Service: Urology;  Laterality: N/A;  300 U    CYSTOSCOPY,WITH BOTULINUM TOXIN INJECTION N/A 3/28/2023    Procedure: CYSTOSCOPY,WITH BOTULINUM TOXIN  INJECTION;  Surgeon: Shireen Mayo MD;  Location: [facility] OR 1ST FLR;  Service: Urology;  Laterality: N/A;  45 MIN.    300 UNITS    ESOPHAGOGASTRODUODENOSCOPY N/A 5/23/2018    Procedure: ESOPHAGOGASTRODUODENOSCOPY (EGD);  Surgeon: Prince Vance MD;  Location: [facility] ENDO (4TH FLR);  Service: Endoscopy;  Laterality: N/A;  r/s 'd per Dr. Vance due to family emergency- ER    ESOPHAGOGASTRODUODENOSCOPY N/A 6/23/2023    Procedure: EGD (ESOPHAGOGASTRODUODENOSCOPY);  Surgeon: Juaquin Barry MD;  Location: [facility] ENDO (2ND FLR);  Service: Endoscopy;  Laterality: N/A;  Refferal: PRINCE VANCE  Order  tele enc 5/18/23  dx: history of a Nissen fundoplication  Additional Scheduling Information:  On home oxygen 2nd floor for airway protection also with a pain pump elevated BMI close to 40   Prep Gastroparesis   ins    HYSTERECTOMY  1975    endometriosis    INJECTION OF BOTULINUM TOXIN TYPE A  7/13/2021    Procedure: INJECTION, BOTULINUM TOXIN, 200units;  Surgeon: Shireen Mayo MD;  [facility];  Service: Urology;;    INJECTION OF BOTULINUM TOXIN TYPE A  11/16/2021    Procedure: INJECTION, BOTULINUM TOXIN, 200units;  Surgeon: Shireen Mayo MD;  [facility];  Service: Urology;;    INJECTION OF BOTULINUM TOXIN TYPE A  7/19/2022    Procedure: INJECTION, BOTULINUM TOXIN, 300 units ;  Surgeon: Shireen Mayo MD;  [facility];  Service: Urology;;    INJECTION OF BOTULINUM TOXIN TYPE A N/A 11/14/2023    Procedure: INJECTION, BOTULINUM TOXIN, TYPE A;  Surgeon: Shireen Mayo MD;  [facility];  Service: Urology;  Laterality: N/A;    INSERTION, SUPRAPUBIC CATHETER N/A 12/13/2022    Procedure: INSERTION, SUPRAPUBIC CATHETER;  Surgeon: Shireen Mayo MD;  Location: [facility] OR 1ST FLR;  Service: Urology;  Laterality: N/A;  exchange    INSERTION, SUPRAPUBIC CATHETER N/A 11/14/2023    Procedure: INSERTION, SUPRAPUBIC CATHETER;  Surgeon: Shireen Mayo MD;   Location: [facility] OR 1ST FLR;  Service: Urology;  Laterality: N/A;  EXCHANGE of s/p tube    pain pump placement      SQ Dilaudid Pump managed by Dr. Hillman, Pain Management    REMOVAL OF BONE SPUR OF FOOT Bilateral 9/16/2022    Procedure: EXCISION ARTHRITIC BONE, BILATERAL FOOT;  Surgeon: Adam Mcguire DPM;  Location: [facility];  Service: Podiatry;  Laterality: Bilateral;    REPLACEMENT OF BACLOFEN PUMP N/A 8/2/2023    Procedure: REPLACEMENT, BACLOFEN PUMP;  Surgeon: Smitha Condon MD;  Location: [facility] OR 2ND FLR;  Service: Neurosurgery;  Laterality: N/A;  Anes: Gen  Blood: Type & Screen  Pos: Left Lat  Intrathecal Pump  Bed: Reg Reversed  Rad: C-arm  Pump: 40 cc, [employer]    REPLACEMENT OF CATHETER N/A 10/31/2019    Procedure: REPLACEMENT, CATHETER-SUPRAPUBIC;  Surgeon: Shireen Mayo MD;  Location: [facility] OR 1ST FLR;  Service: Urology;  Laterality: N/A;    SPINAL CORD STIMULATOR REMOVAL      before [place]    SPINE SURGERY  5-13-13    CERVICAL FUSION    TONSILLECTOMY         Family History   Problem Relation Age of Onset    Cancer Mother 55        breast    Cancer Father         esophagus,had laryngectomy    Esophageal cancer Father     Parkinsonism Maternal Grandmother     Tremor Maternal Grandmother     No Known Problems Brother     No Known Problems Brother     Heart disease Maternal Uncle     Colon cancer Maternal Uncle         Less than 60    No Known Problems Sister     No Known Problems Maternal Aunt     Cirrhosis Paternal Aunt         ETOH    Liver disease Paternal Aunt         ETOH    Liver disease Paternal Uncle         ETOH    Cirrhosis Paternal Uncle         ETOH    No Known Problems Maternal Grandfather     No Known Problems Paternal Grandmother     No Known Problems Paternal Grandfather     Melanoma Neg Hx     Amblyopia Neg Hx     Blindness Neg Hx     Cataracts Neg Hx     Diabetes Neg Hx     Glaucoma Neg Hx     Hypertension Neg Hx     Macular degeneration Neg Hx     Retinal detachment Neg Hx   "   Strabismus Neg Hx     Stroke Neg Hx     Thyroid disease Neg Hx     Celiac disease Neg Hx     Colon polyps Neg Hx     Cystic fibrosis Neg Hx     Crohn's disease Neg Hx     Inflammatory bowel disease Neg Hx     Liver cancer Neg Hx     Rectal cancer Neg Hx     Stomach cancer Neg Hx     Ulcerative colitis Neg Hx     Lymphoma Neg Hx        Social History     Socioeconomic History    Marital status:    Tobacco Use    Smoking status: Never    Smokeless tobacco: Never   Substance and Sexual Activity    Alcohol use: Never    Drug use: Yes     Types: Marijuana     Comment: "medical marijuana gummies"     Social Determinants of Health     Financial Resource Strain: Low Risk  (3/19/2024)    Overall Financial Resource Strain (CARDIA)     Difficulty of Paying Living Expenses: Not very hard   Food Insecurity: No Food Insecurity (3/19/2024)    Hunger Vital Sign     Worried About Running Out of Food in the Last Year: Never true     Ran Out of Food in the Last Year: Never true   Transportation Needs: No Transportation Needs (3/19/2024)    PRAPARE - Transportation     Lack of Transportation (Medical): No     Lack of Transportation (Non-Medical): No   Physical Activity: Inactive (3/19/2024)    Exercise Vital Sign     Days of Exercise per Week: 0 days     Minutes of Exercise per Session: 0 min   Stress: Stress Concern Present (3/19/2024)    [employer] - Occupational Stress Questionnaire     Feeling of Stress : To some extent   Social Connections: Socially Isolated (3/19/2024)    Social Connection and Isolation Panel [NHANES]     Frequency of Communication with Friends and Family: Never     Frequency of Social Gatherings with Friends and Family: Once a week     Attends [employer] Services: Never     Active Member of Clubs or Organizations: No     Attends Club or Organization Meetings: Never     Marital Status:    Housing Stability: Low Risk  (3/19/2024)    Housing Stability Vital Sign     " Unable to Pay for Housing in the Last Year: No     Number of Places Lived in the Last Year: 1     Unstable Housing in the Last Year: No       Allergies:  (d)-limonene flavor; Benadryl [diphenhydramine hcl]; Fentanyl; Imitrex [sumatriptan succinate]; Oxycodone-acetaminophen; Sulfamethoxazole-trimethoprim; Topiramate; Vancomycin; Butorphanol tartrate; Evening primrose (oenothera biennis); Latex; Pregabalin; Propoxyphene n-acetaminophen; Sumatriptan; White petrolatum-zinc; Zinc acetate; Zinc oxide-white petrolatum; Latex, natural rubber; and Phenytoin    Medications:    Current Outpatient Medications:     amitriptyline (ELAVIL) 25 MG tablet, Take 25 mg by mouth every evening., Disp: , Rfl:     aspirin (ECOTRIN) 81 MG EC tablet, Take 1 tablet (81 mg total) by mouth once daily., Disp: 90 tablet, Rfl: 3    atorvastatin (LIPITOR) 80 MG tablet, Take 1 tablet (80 mg total) by mouth once daily., Disp: 90 tablet, Rfl: 3    butalbital-acetaminophen-caffeine -40 mg (FIORICET, ESGIC) -40 mg per tablet, Take 1 tablet by mouth daily as needed., Disp: , Rfl:     calcium-vitamin D3 (OS-JACKIE 500 + D3) 500 mg-5 mcg (200 unit) per tablet, Take 2 tablets by mouth 2 (two) times daily., Disp: 120 tablet, Rfl: 11    cyanocobalamin, vitamin B-12, 5,000 mcg Subl, Place 1 tablet under the tongue once daily., Disp: , Rfl:     darifenacin (ENABLEX) 7.5 MG Tb24, TAKE ONE TABLET BY MOUTH EVERY DAY, Disp: 30 tablet, Rfl: 11    docusate sodium (COLACE) 100 MG capsule, Take 200 mg by mouth every evening., Disp: , Rfl:     DULoxetine (CYMBALTA) 60 MG capsule, Take 1 capsule (60 mg total) by mouth 2 (two) times daily., Disp: 60 capsule, Rfl: 11    fludrocortisone (FLORINEF) 0.1 mg Tab, Take a half-tablet (50 mcg) by mouth once daily, Disp: 15 tablet, Rfl: 5    furosemide (LASIX) 40 MG tablet, Take 2 tablets (80 mg total) by mouth 2 (two) times a day., Disp: 360 tablet, Rfl: 3    HYDROcodone-acetaminophen (NORCO)  mg per tablet, Take 1  tablet by mouth every 6 (six) hours as needed., Disp: 30 tablet, Rfl: 0    hydrocortisone (CORTEF) 10 MG Tab, Take 1 tablet (10 mg total) by mouth every evening., Disp: 30 tablet, Rfl: 2    hydrOXYzine (ATARAX) 50 MG tablet, Take 1 tablet (50 mg total) by mouth every 4 (four) hours as needed for Anxiety., Disp: 30 tablet, Rfl: 0    intrathecal pain pump compound, Hydromorphone (7.5 mg/mL) infusion at 8.59 mg/day (0.3578 mg/hr) out of a total reservoir volume of 40 mL Pump filled monthly, Disp: , Rfl:     levothyroxine (SYNTHROID) 150 MCG tablet, Take 1 tablet (150 mcg total) by mouth before breakfast., Disp: 90 tablet, Rfl: 3    LIDOcaine (LIDODERM) 5 %, Place 1 patch onto the skin once daily. Remove & Discard patch within 12 hours or as directed by MD, Disp: , Rfl: 0    linaCLOtide (LINZESS) 72 mcg Cap capsule, Take 1 capsule (72 mcg total) by mouth before breakfast., Disp: 30 capsule, Rfl: 11    nitroGLYCERIN (NITROSTAT) 0.4 MG SL tablet, Place 0.4 mg under the tongue every 5 (five) minutes as needed for Chest pain., Disp: , Rfl:     ondansetron (ZOFRAN-ODT) 4 MG TbDL, Take 4 mg by mouth every 4 to 6 hours as needed., Disp: , Rfl:     pantoprazole (PROTONIX) 40 MG tablet, Take 1 tablet (40 mg total) by mouth once daily., Disp: 90 tablet, Rfl: 3    potassium chloride (MICRO-K) 10 MEQ CpSR, Take 2 capsules (20 mEq total) by mouth once daily., Disp: 60 capsule, Rfl: 11    promethazine (PHENERGAN) 25 MG tablet, Take 25 mg by mouth every 6 (six) hours as needed for Nausea., Disp: , Rfl:     QUEtiapine (SEROQUEL) 100 MG Tab, TAKE 1 TABLET (100 MG) BY MOUTH NIGHTLY, Disp: 180 tablet, Rfl: 3    senna (SENNA LAX) 8.6 mg tablet, Take 2 tablets by mouth 2 (two) times daily., Disp: , Rfl:     tiotropium bromide (SPIRIVA RESPIMAT) 2.5 mcg/actuation inhaler, Inhale 2 puffs into the lungs once daily. Controller, Disp: 4 g, Rfl: 1    traMADoL (ULTRAM) 50 mg tablet, Take 1 tablet (50 mg total) by mouth every 4 (four) hours as  needed for Pain., Disp: 30 tablet, Rfl: 0    traZODone (DESYREL) 300 MG tablet, Take 0.5 tablets (150 mg total) by mouth every evening., Disp: 90 tablet, Rfl: 3    triamcinolone acetonide 0.1% (KENALOG) 0.1 % cream, Apply topically 2 (two) times daily., Disp: 45 g, Rfl: 0    PHYSICAL EXAMINATION:  Physical Exam  HENT:      Head: Normocephalic.      Right Ear: External ear normal.      Left Ear: External ear normal.      Nose: Nose normal.      Mouth/Throat:      Mouth: Mucous membranes are moist.   Pulmonary:      Effort: Pulmonary effort is normal. No respiratory distress.   Skin:     General: Skin is warm and dry.   Neurological:      General: No focal deficit present.      Mental Status: She is alert. Mental status is at baseline.   Psychiatric:         Mood and Affect: Mood normal.         Lab Results   Component Value Date    CREATININE 1.1 03/10/2024    EGFRNORACEVR 55 (A) 03/10/2024           IMPRESSION:  Encounter Diagnoses   Name Primary?    Neurogenic bladder Yes    Detrusor overactivity     Suprapubic catheter     Mixed stress and urge urinary incontinence          Assessment:       1. Neurogenic bladder    2. Detrusor overactivity    3. Suprapubic catheter    4. Mixed stress and urge urinary incontinence        Plan:   Name,  and allergies verified  I removed existing SPT without any difficulty and properly disposed, dirty gloves removed, hand hygiene performed.   Stoma cleaned with betadine. Surrounding skin with erythema. Barrier cream recommended.  I easily placed a 20 fr silicone SPT using sterile technique. SPT flushed with 20 ml sterile water to confirm placement.  Urine collected for culture from newly placed SPT - will call with tx  Clear yellow urine received and balloon inflated by with 15 ml sterile water.   Tolerated well.  Site cleaned.   Daily skin care and suprapubic catheter care discussed.  Educational materials given.  Increase water intake to help with sediment.   Voiced  understanding.       RTC 4/23/24 for procedure    I spent 30 minutes with the patient of which more than half was spent in direct consultation with the patient in regards to our treatment and plan.  We addressed the office findings and recent labs; any need to go ER today.   Education and recommendations of today's plan of care including home remedies and needed follow up with PCP.   We discussed the chief complaints; reviewed the LUTS and the possible contributory factors.

## 2024-04-12 ENCOUNTER — TELEPHONE (OUTPATIENT)
Dept: INTERNAL MEDICINE | Facility: CLINIC | Age: 69
End: 2024-04-12

## 2024-04-12 ENCOUNTER — DOCUMENT SCAN (OUTPATIENT)
Dept: HOME HEALTH SERVICES | Facility: HOSPITAL | Age: 69
End: 2024-04-12
Payer: MEDICARE

## 2024-04-12 ENCOUNTER — OUTPATIENT CASE MANAGEMENT (OUTPATIENT)
Dept: ADMINISTRATIVE | Facility: OTHER | Age: 69
End: 2024-04-12
Payer: MEDICARE

## 2024-04-12 DIAGNOSIS — I89.0 LYMPHEDEMA: Primary | ICD-10-CM

## 2024-04-12 RX ORDER — TRAZODONE HYDROCHLORIDE 300 MG/1
150 TABLET ORAL NIGHTLY
Qty: 90 TABLET | Refills: 3 | Status: SHIPPED | OUTPATIENT
Start: 2024-04-12 | End: 2024-05-02 | Stop reason: SDUPTHER

## 2024-04-12 RX ORDER — QUETIAPINE FUMARATE 100 MG/1
TABLET, FILM COATED ORAL
Qty: 180 TABLET | Refills: 3 | Status: SHIPPED | OUTPATIENT
Start: 2024-04-12 | End: 2024-05-02 | Stop reason: SDUPTHER

## 2024-04-12 RX ORDER — HYDROCODONE BITARTRATE AND ACETAMINOPHEN 10; 325 MG/1; MG/1
1 TABLET ORAL EVERY 6 HOURS PRN
Qty: 30 TABLET | Refills: 0 | Status: ON HOLD | OUTPATIENT
Start: 2024-04-12 | End: 2024-05-29

## 2024-04-12 RX ORDER — TRAMADOL HYDROCHLORIDE 50 MG/1
50 TABLET ORAL EVERY 4 HOURS PRN
Qty: 30 TABLET | Refills: 0 | Status: ON HOLD | OUTPATIENT
Start: 2024-04-12 | End: 2024-05-23

## 2024-04-12 NOTE — PROGRESS NOTES
Outpatient Care Management   - Care Plan Follow Up    Patient: Tasha Hawley  MRN:  [id number]  Date of Service:  4/12/2024  Completed by:  Jaycee Caro LMSW  Referral Date: 02/10/2024    Reason for Visit   Patient presents with    OPCM SW Follow Up Call     4/12/2024       Brief Summary: SW received message from PCP that he was off today. MA in clinic was able to get a different provider to place revised NH orders and fax requested hard scripts to facility. Pt's daughter Lisa stated that pt was told to come to facility at 4 PM for admit. SW called and left message for admissions to verify faxes were received. Sw received call back from NH at 4:30 stating pt was at facility but her  did not bring her any clothes. Admissions also stated they cannot allow her pain pump. The pt's pain pump was explicitly discussed when referral was sent. SW explained that this did not need to be managed by the NH. SW asked NH to call pt's daughter and placed SW on hold for 10 minutes. Admissions came back and said the pain pump is imbedded so it needs to be indicated on the NH orders that it does not need to be filled by facility's pharmacy. SW messaged MA and in basket PCP. GERSON sent email to NH that these revised orders cannot be fulfilled today on a Friday at 4:30 PM.     Complex Care Plan     Care plan was discussed and completed today with input from patient and/or caregiver.    Patient Instructions     No follow-ups on file.

## 2024-04-12 NOTE — TELEPHONE ENCOUNTER
NURSING HOME ORDERS     04/12/2024  [facility]  [facility]  [address]  [facility] [address]  Dept: [phone]      Admit to Nursing Home:       Diagnoses:There are no hospital problems to display for this patient.        Patient is homebound due to:  [unfilled]     Allergies:  Review of patient's allergies indicates:  Allergen Reactions  · (d)-limonene flavor        Other reaction(s): difficult intubation  Other reaction(s): Difficulty breathing  · Benadryl [diphenhydramine hcl] Shortness Of Breath  · Fentanyl Itching, Nausea And Vomiting and Swelling               · Imitrex [sumatriptan succinate] Shortness Of Breath  · Oxycodone-acetaminophen Shortness Of Breath  · Sulfamethoxazole-trimethoprim Anaphylaxis  · Topiramate Shortness Of Breath  · Vancomycin Anaphylaxis      Rash  · Butorphanol tartrate    · Evening primrose (oenothera biennis)    · Latex    · Pregabalin Other (See Comments)      tremors  · Propoxyphene n-acetaminophen        Other reaction(s): Difficulty breathing  · Sumatriptan    · White petrolatum-zinc    · Zinc acetate    · Zinc oxide-white petrolatum        Other reaction(s): Difficulty breathing  · Latex, natural rubber Itching and Rash  · Phenytoin Rash and Other (See Comments)      Trouble breathing        Vitals:  Routine     Diet: cardiac diet     Activities:   Ambulate with assistance to bathroom and Activity as tolerated     Goals of Care Treatment Preferences:  Code Status: Full Code     Health care agent: Dangelo Hawley (spouse)  Health care agent number: [phone]     Living Will: Yes             Labs:  weekly cmp and urinalysis     Nursing Precautions:  Fall and Pressure ulcer prevention     Consults:   PT to evaluate and treat- 5 times a week, OT to evaluate and treat- 3 times a week, and Nutrition to evaluate and recommend diet      Miscellaneous Care: Motley Care: Empty Motley bag every shift.  Change  Tiesha every month  Routine Skin for Bedridden Patients:  Apply moisture barrier cream to all  CHF Care: Daily Weight with notification of MD/NP of 2lb or > increase in 24 hours     v/s and O2 sat every shift     Oxygen as needed for sats <90%     Report abnormal breath sounds to MD/NP     Edema checks q shift- notify MD/NP of increased edema     Task segmentation by nursing for daily care to decrease exertion     CHF education to include diet ,medication, and CHF flags for MD notification                                                                                                                                                                                        Diabetes Care:  not diabetic     Medications: Discontinue all previous medication orders, if any. See new list below.     Medication List            Accurate as of March 19, 2024 11:59 PM. If you have any questions, ask your nurse or doctor.                      CONTINUE taking these medications      amitriptyline 25 MG tablet  Commonly known as: ELAVIL  Take 25 mg by mouth every evening.     aspirin 81 MG EC tablet  Commonly known as: ECOTRIN  Take 1 tablet (81 mg total) by mouth once daily.     atorvastatin 80 MG tablet  Commonly known as: LIPITOR  Take 1 tablet (80 mg total) by mouth once daily.     butalbital-acetaminophen-caffeine -40 mg -40 mg per tablet  Commonly known as: FIORICET, ESGIC  Take 1 tablet by mouth daily as needed for severe headache     cyanocobalamin (vitamin B-12) 5,000 mcg Subl  Place 1 tablet under the tongue once monthly.     darifenacin 7.5 MG Tb24  Commonly known as: ENABLEX  TAKE ONE TABLET BY MOUTH EVERY DAY     docusate sodium 100 MG capsule  Commonly known as: COLACE  Take 200 mg by mouth every evening.     DULoxetine 60 MG capsule  Commonly known as: CYMBALTA  Take 1 capsule (60 mg total) by mouth daily.     fludrocortisone 0.1 mg Tab  Commonly known as: FLORINEF  Take a half-tablet (50 mcg) by mouth once  daily     fluticasone propionate 50 mcg/actuation nasal spray  Commonly known as: FLONASE  2 sprays (100 mcg total) by Each Nostril route once daily.     furosemide 40 MG tablet  Commonly known as: LASIX  Take 2 tablets (80 mg total) by mouth 2 (two) times a day.     HYDROcodone-acetaminophen  mg per tablet  Commonly known as: NORCO  Take 1 tablet by mouth every 6 (six) hours as needed for pain.     hydrocortisone 10 MG Tab  Commonly known as: CORTEF  Take 1 tablet (10 mg total) by mouth every evening.     hydrOXYzine 50 MG tablet  Commonly known as: ATARAX  Take 1 tablet (50 mg total) by mouth every 4 (four) hours as needed for Anxiety.     INTRATHECAL PAIN PUMP COMPOUND  Hydromorphone (7.5 mg/mL) infusion at 8.59 mg/day (0.3578 mg/hr) out of a total reservoir volume of 40 mL  Pump filled monthly     levothyroxine 150 MCG tablet  Commonly known as: SYNTHROID  Take 1 tablet (150 mcg total) by mouth before breakfast.           linaCLOtide 72 mcg Cap capsule  Commonly known as: LINZESS  Take 1 capsule (72 mcg total) by mouth before breakfast.     nitroGLYCERIN 0.4 MG SL tablet  Commonly known as: NITROSTAT  Place 0.4 mg under the tongue every 5 (five) minutes as needed for Chest pain.     ondansetron 4 MG Tbdl  Commonly known as: ZOFRAN-ODT  Take 4 mg by mouth every 4 to 6 hours as needed for nausea.     OYSTER SHELL CALCIUM-VIT D3 500 mg-5 mcg (200 unit) per tablet  Generic drug: calcium-vitamin D3  Take 2 tablets by mouth 2 (two) times daily.     pantoprazole 40 MG tablet  Commonly known as: PROTONIX  Take 1 tablet (40 mg total) by mouth once daily.     potassium chloride 10 MEQ Cpsr  Commonly known as: MICRO-K  Take 2 capsules (20 mEq total) by mouth once daily.     promethazine 25 MG tablet  Commonly known as: PHENERGAN  Take 25 mg by mouth every 6 (six) hours as needed for Nausea.     QUEtiapine 100 MG Tab  Commonly known as: SEROQUEL  TAKE 1 TABLET (100 MG) BY MOUTH NIGHTLY     senna 8.6 mg  tablet  Commonly known as: SENNA LAX  Take 2 tablets by mouth nightly.     SPIRIVA RESPIMAT 2.5 mcg/actuation inhaler  Generic drug: tiotropium bromide  Inhale 2 puffs into the lungs once daily. Controller       AM  traMADoL 50 mg tablet  Commonly known as: ULTRAM  Take 1 tablet (50 mg total) by mouth every 4 (four) hours as needed for Pain.     traZODone 300 MG tablet  Commonly known as: DESYREL  Take 0.5 tablets (150 mg total) by mouth every evening.                          Immunizations Administered as of 3/19/2024        Name Date Dose VIS Date Route Exp Date    COVID-19, MRNA, LN-S, PF (Pfizer) (Purple Cap) 4/23/2021 10:56 AM 0.3 mL 12/12/2020 Intramuscular 7/31/2021    Site: Right deltoid      Given By: Mirna Milner      : [employer]      Lot: [id number]      COVID-19, MRNA, LN-S, PF (Pfizer) (Purple Cap) 4/2/2021 12:39 PM 0.3 mL 12/12/2020 Intramuscular 7/31/2021    Site: Right deltoid      Given By: Juan Day MA      : [employer]      Lot: [id number]

## 2024-04-12 NOTE — TELEPHONE ENCOUNTER
NURSING HOME ORDERS     04/11/2024  [facility]  [facility]  [address]  [facility] [address]  Dept: [phone]      Admit to Nursing Home:       Diagnoses:There are no hospital problems to display for this patient.        Patient is homebound due to:  [unfilled]     Allergies:        Review of patient's allergies indicates:   Allergen Reactions    (d)-limonene flavor         Other reaction(s): difficult intubation  Other reaction(s): Difficulty breathing    Benadryl [diphenhydramine hcl] Shortness Of Breath    Fentanyl Itching, Nausea And Vomiting and Swelling                  Imitrex [sumatriptan succinate] Shortness Of Breath    Oxycodone-acetaminophen Shortness Of Breath    Sulfamethoxazole-trimethoprim Anaphylaxis    Topiramate Shortness Of Breath    Vancomycin Anaphylaxis       Rash    Butorphanol tartrate      Evening primrose (oenothera biennis)      Latex      Pregabalin Other (See Comments)       tremors    Propoxyphene n-acetaminophen         Other reaction(s): Difficulty breathing    Sumatriptan      White petrolatum-zinc      Zinc acetate      Zinc oxide-white petrolatum         Other reaction(s): Difficulty breathing    Latex, natural rubber Itching and Rash    Phenytoin Rash and Other (See Comments)       Trouble breathing         Vitals:  Routine     Diet: cardiac diet     Activities:   Ambulate with assistance to bathroom and Activity as tolerated     Goals of Care Treatment Preferences:  Code Status: Full Code     Health care agent: Dangelo Hawley (spouse)  Health care agent number: [phone]     Living Will: Yes             Labs:  weekly cmp and urinalysis     Nursing Precautions:  Fall and Pressure ulcer prevention     Consults:   PT to evaluate and treat- 5 times a week, OT to evaluate and treat- 3 times a week, and Nutrition to evaluate and recommend diet      Miscellaneous Care: Motley Care: Empty Motley bag every shift.   Change Motley every month  Routine Skin for Bedridden Patients:  Apply moisture barrier cream to all  CHF Care: Daily Weight with notification of MD/NP of 2lb or > increase in 24 hours     v/s and O2 sat every shift     Oxygen as needed for sats <90%     Report abnormal breath sounds to MD/NP     Edema checks q shift- notify MD/NP of increased edema     Task segmentation by nursing for daily care to decrease exertion     CHF education to include diet ,medication, and CHF flags for MD notification                                                                                                                                                                                        Diabetes Care:  not diabetic     Medications: Discontinue all previous medication orders, if any. See new list below.      Medication List               Accurate as of March 19, 2024 11:59 PM. If you have any questions, ask your nurse or doctor.                    CONTINUE taking these medications       amitriptyline 25 MG tablet  Commonly known as: ELAVIL  Take 25 mg by mouth every evening.      aspirin 81 MG EC tablet  Commonly known as: ECOTRIN  Take 1 tablet (81 mg total) by mouth once daily.      atorvastatin 80 MG tablet  Commonly known as: LIPITOR  Take 1 tablet (80 mg total) by mouth once daily.      butalbital-acetaminophen-caffeine -40 mg -40 mg per tablet  Commonly known as: FIORICET, ESGIC  Take 1 tablet by mouth daily as needed for severe headache      cyanocobalamin (vitamin B-12) 5,000 mcg Subl  Place 1 tablet under the tongue once monthly.      darifenacin 7.5 MG Tb24  Commonly known as: ENABLEX  TAKE ONE TABLET BY MOUTH EVERY DAY      docusate sodium 100 MG capsule  Commonly known as: COLACE  Take 200 mg by mouth every evening.      DULoxetine 60 MG capsule  Commonly known as: CYMBALTA  Take 1 capsule (60 mg total) by mouth daily.      fludrocortisone 0.1 mg Tab  Commonly known as: FLORINEF  Take a half-tablet (50  mcg) by mouth once daily      fluticasone propionate 50 mcg/actuation nasal spray  Commonly known as: FLONASE  2 sprays (100 mcg total) by Each Nostril route once daily.      furosemide 40 MG tablet  Commonly known as: LASIX  Take 2 tablets (80 mg total) by mouth 2 (two) times a day.      HYDROcodone-acetaminophen  mg per tablet  Commonly known as: NORCO  Take 1 tablet by mouth every 6 (six) hours as needed for pain.      hydrocortisone 10 MG Tab  Commonly known as: CORTEF  Take 1 tablet (10 mg total) by mouth every evening.      hydrOXYzine 50 MG tablet  Commonly known as: ATARAX  Take 1 tablet (50 mg total) by mouth every 4 (four) hours as needed for Anxiety.      INTRATHECAL PAIN PUMP COMPOUND  Hydromorphone (7.5 mg/mL) infusion at 8.59 mg/day (0.3578 mg/hr) out of a total reservoir volume of 40 mL  Pump filled monthly      levothyroxine 150 MCG tablet  Commonly known as: SYNTHROID  Take 1 tablet (150 mcg total) by mouth before breakfast.             linaCLOtide 72 mcg Cap capsule  Commonly known as: LINZESS  Take 1 capsule (72 mcg total) by mouth before breakfast.      nitroGLYCERIN 0.4 MG SL tablet  Commonly known as: NITROSTAT  Place 0.4 mg under the tongue every 5 (five) minutes as needed for Chest pain.      ondansetron 4 MG Tbdl  Commonly known as: ZOFRAN-ODT  Take 4 mg by mouth every 4 to 6 hours as needed for nausea.      OYSTER SHELL CALCIUM-VIT D3 500 mg-5 mcg (200 unit) per tablet  Generic drug: calcium-vitamin D3  Take 2 tablets by mouth 2 (two) times daily.      pantoprazole 40 MG tablet  Commonly known as: PROTONIX  Take 1 tablet (40 mg total) by mouth once daily.      potassium chloride 10 MEQ Cpsr  Commonly known as: MICRO-K  Take 2 capsules (20 mEq total) by mouth once daily.      promethazine 25 MG tablet  Commonly known as: PHENERGAN  Take 25 mg by mouth every 6 (six) hours as needed for Nausea.      QUEtiapine 100 MG Tab  Commonly known as: SEROQUEL  TAKE 1 TABLET (100 MG) BY MOUTH  NIGHTLY      senna 8.6 mg tablet  Commonly known as: SENNA LAX  Take 2 tablets by mouth nightly.      SPIRIVA RESPIMAT 2.5 mcg/actuation inhaler  Generic drug: tiotropium bromide  Inhale 2 puffs into the lungs once daily. Controller      traMADoL 50 mg tablet  Commonly known as: ULTRAM  Take 1 tablet (50 mg total) by mouth every 4 (four) hours as needed for Pain.      traZODone 300 MG tablet  Commonly known as: DESYREL  Take 0.5 tablets (150 mg total) by mouth every evening.                             Immunizations Administered as of 3/19/2024         Name Date Dose VIS Date Route Exp Date     COVID-19, MRNA, LN-S, PF (Pfizer) (Purple Cap) 4/23/2021 10:56 AM 0.3 mL 12/12/2020 Intramuscular 7/31/2021     Site: Right deltoid       Given By: Mirna Milner       : [employer]       Lot: [id number]       COVID-19, MRNA, LN-S, PF (Pfizer) (Purple Cap) 4/2/2021 12:39 PM 0.3 mL 12/12/2020 Intramuscular 7/31/2021     Site: Right deltoid       Given By: Juan Day MA       : Pf[employer]       Lot: [id number]                 This patient has had both covid vaccinations    Some patients may experience side effects after vaccination.  These may include fever, headache, muscle or joint aches.  Most symptoms resolve with 24-48 hours and do not require urgent medical evaluation unless they persist for more than 72 hours or symptoms are concerning for an unrelated medical condition.           _________________________________  Mesfin Hodges Ii, MD  04/11/2024                                              Rtc prn, or in a month WITH ALL PILL BOTTLES    PAPO Hodges MD MPH  Staff Internist

## 2024-04-12 NOTE — TELEPHONE ENCOUNTER
NURSING HOME ORDERS     04/11/2024  [facility]  [facility]  [address]  [facility] [address]  Dept: [phone]      Admit to Nursing Home:       Diagnoses:There are no hospital problems to display for this patient.        Patient is homebound due to:  [unfilled]     Allergies:  Review of patient's allergies indicates:  Allergen Reactions  · (d)-limonene flavor        Other reaction(s): difficult intubation  Other reaction(s): Difficulty breathing  · Benadryl [diphenhydramine hcl] Shortness Of Breath  · Fentanyl Itching, Nausea And Vomiting and Swelling               · Imitrex [sumatriptan succinate] Shortness Of Breath  · Oxycodone-acetaminophen Shortness Of Breath  · Sulfamethoxazole-trimethoprim Anaphylaxis  · Topiramate Shortness Of Breath  · Vancomycin Anaphylaxis      Rash  · Butorphanol tartrate    · Evening primrose (oenothera biennis)    · Latex    · Pregabalin Other (See Comments)      tremors  · Propoxyphene n-acetaminophen        Other reaction(s): Difficulty breathing  · Sumatriptan    · White petrolatum-zinc    · Zinc acetate    · Zinc oxide-white petrolatum        Other reaction(s): Difficulty breathing  · Latex, natural rubber Itching and Rash  · Phenytoin Rash and Other (See Comments)      Trouble breathing        Vitals:  Routine     Diet: cardiac diet     Activities:   Ambulate with assistance to bathroom and Activity as tolerated     Goals of Care Treatment Preferences:  Code Status: Full Code     Health care agent: Dangelo Hawley (spouse)  Health care agent number: [phone]     Living Will: Yes             Labs:  weekly cmp and urinalysis     Nursing Precautions:  Fall and Pressure ulcer prevention     Consults:   PT to evaluate and treat- 5 times a week, OT to evaluate and treat- 3 times a week, and Nutrition to evaluate and recommend diet      Miscellaneous Care: Motley Care: Empty Motley bag every shift.  Change  Tiesha every month  Routine Skin for Bedridden Patients:  Apply moisture barrier cream to all  CHF Care: Daily Weight with notification of MD/NP of 2lb or > increase in 24 hours     v/s and O2 sat every shift     Oxygen as needed for sats <90%     Report abnormal breath sounds to MD/NP     Edema checks q shift- notify MD/NP of increased edema     Task segmentation by nursing for daily care to decrease exertion     CHF education to include diet ,medication, and CHF flags for MD notification                                                                                                                                                                                        Diabetes Care:  not diabetic     Medications: Discontinue all previous medication orders, if any. See new list below.     Medication List            Accurate as of March 19, 2024 11:59 PM. If you have any questions, ask your nurse or doctor.                      CONTINUE taking these medications      amitriptyline 25 MG tablet  Commonly known as: ELAVIL  Take 25 mg by mouth every evening.     aspirin 81 MG EC tablet  Commonly known as: ECOTRIN  Take 1 tablet (81 mg total) by mouth once daily.     atorvastatin 80 MG tablet  Commonly known as: LIPITOR  Take 1 tablet (80 mg total) by mouth once daily.     butalbital-acetaminophen-caffeine -40 mg -40 mg per tablet  Commonly known as: FIORICET, ESGIC  Take 1 tablet by mouth daily as needed for severe headache     cyanocobalamin (vitamin B-12) 5,000 mcg Subl  Place 1 tablet under the tongue once monthly.     darifenacin 7.5 MG Tb24  Commonly known as: ENABLEX  TAKE ONE TABLET BY MOUTH EVERY DAY     docusate sodium 100 MG capsule  Commonly known as: COLACE  Take 200 mg by mouth every evening.     DULoxetine 60 MG capsule  Commonly known as: CYMBALTA  Take 1 capsule (60 mg total) by mouth daily.     fludrocortisone 0.1 mg Tab  Commonly known as: FLORINEF  Take a half-tablet (50 mcg) by mouth once  daily     fluticasone propionate 50 mcg/actuation nasal spray  Commonly known as: FLONASE  2 sprays (100 mcg total) by Each Nostril route once daily.     furosemide 40 MG tablet  Commonly known as: LASIX  Take 2 tablets (80 mg total) by mouth 2 (two) times a day.     HYDROcodone-acetaminophen  mg per tablet  Commonly known as: NORCO  Take 1 tablet by mouth every 6 (six) hours as needed for pain.     hydrocortisone 10 MG Tab  Commonly known as: CORTEF  Take 1 tablet (10 mg total) by mouth every evening.     hydrOXYzine 50 MG tablet  Commonly known as: ATARAX  Take 1 tablet (50 mg total) by mouth every 4 (four) hours as needed for Anxiety.     INTRATHECAL PAIN PUMP COMPOUND  Hydromorphone (7.5 mg/mL) infusion at 8.59 mg/day (0.3578 mg/hr) out of a total reservoir volume of 40 mL  Pump filled monthly     levothyroxine 150 MCG tablet  Commonly known as: SYNTHROID  Take 1 tablet (150 mcg total) by mouth before breakfast.           linaCLOtide 72 mcg Cap capsule  Commonly known as: LINZESS  Take 1 capsule (72 mcg total) by mouth before breakfast.     nitroGLYCERIN 0.4 MG SL tablet  Commonly known as: NITROSTAT  Place 0.4 mg under the tongue every 5 (five) minutes as needed for Chest pain.     ondansetron 4 MG Tbdl  Commonly known as: ZOFRAN-ODT  Take 4 mg by mouth every 4 to 6 hours as needed for nausea.     OYSTER SHELL CALCIUM-VIT D3 500 mg-5 mcg (200 unit) per tablet  Generic drug: calcium-vitamin D3  Take 2 tablets by mouth 2 (two) times daily.     pantoprazole 40 MG tablet  Commonly known as: PROTONIX  Take 1 tablet (40 mg total) by mouth once daily.     potassium chloride 10 MEQ Cpsr  Commonly known as: MICRO-K  Take 2 capsules (20 mEq total) by mouth once daily.     promethazine 25 MG tablet  Commonly known as: PHENERGAN  Take 25 mg by mouth every 6 (six) hours as needed for Nausea.     QUEtiapine 100 MG Tab  Commonly known as: SEROQUEL  TAKE 1 TABLET (100 MG) BY MOUTH NIGHTLY     senna 8.6 mg  tablet  Commonly known as: SENNA LAX  Take 2 tablets by mouth nightly.     SPIRIVA RESPIMAT 2.5 mcg/actuation inhaler  Generic drug: tiotropium bromide  Inhale 2 puffs into the lungs once daily. Controller       AM  traMADoL 50 mg tablet  Commonly known as: ULTRAM  Take 1 tablet (50 mg total) by mouth every 4 (four) hours as needed for Pain.     traZODone 300 MG tablet  Commonly known as: DESYREL  Take 0.5 tablets (150 mg total) by mouth every evening.                          Immunizations Administered as of 3/19/2024        Name Date Dose VIS Date Route Exp Date    COVID-19, MRNA, LN-S, PF (Pfizer) (Purple Cap) 4/23/2021 10:56 AM 0.3 mL 12/12/2020 Intramuscular 7/31/2021    Site: Right deltoid      Given By: Mirna Milner      : [employer]      Lot: [id number]      COVID-19, MRNA, LN-S, PF (Pfizer) (Purple Cap) 4/2/2021 12:39 PM 0.3 mL 12/12/2020 Intramuscular 7/31/2021    Site: Right deltoid      Given By: Juan Day MA      : [employer]      Lot: [id number]

## 2024-04-13 LAB — BACTERIA UR CULT: ABNORMAL

## 2024-04-15 ENCOUNTER — OUTPATIENT CASE MANAGEMENT (OUTPATIENT)
Dept: ADMINISTRATIVE | Facility: OTHER | Age: 69
End: 2024-04-15
Payer: MEDICARE

## 2024-04-15 DIAGNOSIS — N39.0 RECURRENT UTI: Primary | ICD-10-CM

## 2024-04-15 RX ORDER — FLUCONAZOLE 200 MG/1
200 TABLET ORAL DAILY
Qty: 7 TABLET | Refills: 0 | Status: SHIPPED | OUTPATIENT
Start: 2024-04-15 | End: 2024-04-22

## 2024-04-15 NOTE — PROGRESS NOTES
Outpatient Care Management   - Care Plan Follow Up    Patient: Tasha Hawley  MRN:  [id number]  Date of Service:  4/15/2024  Completed by:  Jaycee Caro LMSW  Referral Date: 02/10/2024    Reason for Visit   Patient presents with    Case Closure     4/15/2024       Brief Summary: GERSON collaborated with clinic MA for revised orders to be faxed to SNF. GERSON followed up with [facility] admissions and confirmed that pt admitted on Friday and they received the revised NH orders. SW follow up with pt's daughter, Lisa and she stated things were going well so far. Case closed. Notified PCP of NH admission and case closure.     Complex Care Plan     Care plan was discussed and completed today with input from patient and/or caregiver.    Patient Instructions     No follow-ups on file.

## 2024-04-15 NOTE — PROGRESS NOTES
Patient is admitted to [facility]. Faxed prescription for 200 mg Diflucan daily x 7 days.

## 2024-04-22 ENCOUNTER — TELEPHONE (OUTPATIENT)
Dept: UROLOGY | Facility: CLINIC | Age: 69
End: 2024-04-22
Payer: MEDICARE

## 2024-04-22 NOTE — TELEPHONE ENCOUNTER
Good Morning. Your surgery will start at 2:30 pm, but your arrival time is 1:00 pm  It will be on the first floor [facility] behind the [facility].  You will need for someone to drive you home because you will be under anaesthesia.  No eating or drinking after Midnight. Take a shower the night before and the morning of with a anti-bacterial soap, ( Dial Soap/ Hibiclens) Do not apply and deodorant, perfume, powder or body lotions the morning of surgery. Wear comfortable clothing, like loose fitting pants and a button down shirt. Leave all jewelry and valuables at home.   If you have any questions don't hesitate to call me. Shireen [phone]

## 2024-04-23 ENCOUNTER — DOCUMENT SCAN (OUTPATIENT)
Dept: HOME HEALTH SERVICES | Facility: HOSPITAL | Age: 69
End: 2024-04-23
Payer: MEDICARE

## 2024-04-24 ENCOUNTER — DOCUMENT SCAN (OUTPATIENT)
Dept: HOME HEALTH SERVICES | Facility: HOSPITAL | Age: 69
End: 2024-04-24
Payer: MEDICARE

## 2024-04-25 ENCOUNTER — TELEPHONE (OUTPATIENT)
Dept: ENDOSCOPY | Facility: HOSPITAL | Age: 69
End: 2024-04-25
Payer: MEDICARE

## 2024-04-25 NOTE — TELEPHONE ENCOUNTER
Pt did not have voicemail to leave a message for pt to call Endoscopy Scheduling to confirm esophageal manometry on 4/29/24.

## 2024-04-29 NOTE — TELEPHONE ENCOUNTER
No care due was identified.  [facility] Embedded Care Due Messages. Reference number: [id number].   4/29/2024 12:49:26 PM CDT

## 2024-04-30 ENCOUNTER — TELEPHONE (OUTPATIENT)
Dept: INTERNAL MEDICINE | Facility: CLINIC | Age: 69
End: 2024-04-30
Payer: MEDICARE

## 2024-04-30 RX ORDER — FUROSEMIDE 40 MG/1
40 TABLET ORAL
Qty: 90 TABLET | Refills: 3 | Status: SHIPPED | OUTPATIENT
Start: 2024-04-30 | End: 2024-05-02 | Stop reason: SDUPTHER

## 2024-04-30 NOTE — TELEPHONE ENCOUNTER
Refill Routing Note   Medication(s) are not appropriate for processing by [facility] for the following reason(s):        No active prescription written by provider  Required labs abnormal    [facility] action(s):  Defer               Appointments  past 12m or future 3m with PCP    Date Provider   Last Visit   3/19/2024 Mesfin Hodges II, MD   Next Visit   Visit date not found Mesfin Hodges II, MD   ED visits in past 90 days: 2        Note composed:12:18 PM 04/30/2024

## 2024-04-30 NOTE — TELEPHONE ENCOUNTER
----- Message from Néstorpatriciafarhad Llamas sent at 4/30/2024 12:27 PM CDT -----  Contact: Self [phone]  Would like to receive medical advice.    Would they like a call back or a response via [facility]:  call back     Additional information:  Calling to speak with the nurse in the office.

## 2024-05-01 ENCOUNTER — TELEPHONE (OUTPATIENT)
Dept: PULMONOLOGY | Facility: CLINIC | Age: 69
End: 2024-05-01
Payer: MEDICARE

## 2024-05-01 NOTE — TELEPHONE ENCOUNTER
Call was returned to patient in regards to her having a raspy voice. Patient states she need to schedule an appointment. I informed patient that she's an EP with Dr Chilel. Patient is scheduled on 6/20/24 at 9 AM. Patient is added to the waiting list. Appointment mailed. Patient confirmed and verbalized understanding.

## 2024-05-01 NOTE — TELEPHONE ENCOUNTER
----- Message from Margaret Salinas sent at 5/1/2024  4:29 PM CDT -----  Regarding: appt needed  Contact: pt @  [phone]  Pt is calling to advise that she has been hoarse with raspy voice for some months and hard time breathing on her own she has oxygen. Pt is needing to be seen to get a mobile oxygen tank along with the difficulties  breathing. Please call to advise further. Thank you for all you are doing.

## 2024-05-02 ENCOUNTER — OFFICE VISIT (OUTPATIENT)
Dept: INTERNAL MEDICINE | Facility: CLINIC | Age: 69
End: 2024-05-02
Payer: MEDICARE

## 2024-05-02 ENCOUNTER — TELEPHONE (OUTPATIENT)
Dept: UROLOGY | Facility: CLINIC | Age: 69
End: 2024-05-02
Payer: MEDICARE

## 2024-05-02 VITALS — DIASTOLIC BLOOD PRESSURE: 72 MMHG | SYSTOLIC BLOOD PRESSURE: 131 MMHG | HEART RATE: 65 BPM

## 2024-05-02 DIAGNOSIS — G89.29 CHRONIC PAIN OF BOTH FEET: ICD-10-CM

## 2024-05-02 DIAGNOSIS — E03.9 HYPOTHYROIDISM (ACQUIRED): ICD-10-CM

## 2024-05-02 DIAGNOSIS — G47.01 INSOMNIA DUE TO MEDICAL CONDITION: Chronic | ICD-10-CM

## 2024-05-02 DIAGNOSIS — R29.6 FREQUENT FALLS: ICD-10-CM

## 2024-05-02 DIAGNOSIS — N18.31 STAGE 3A CHRONIC KIDNEY DISEASE: ICD-10-CM

## 2024-05-02 DIAGNOSIS — E78.2 MIXED HYPERLIPIDEMIA: ICD-10-CM

## 2024-05-02 DIAGNOSIS — I89.0 LYMPHEDEMA: ICD-10-CM

## 2024-05-02 DIAGNOSIS — N31.9 NEUROGENIC BLADDER: Primary | ICD-10-CM

## 2024-05-02 DIAGNOSIS — M79.672 CHRONIC PAIN OF BOTH FEET: ICD-10-CM

## 2024-05-02 DIAGNOSIS — Z93.59 SUPRAPUBIC CATHETER: Chronic | ICD-10-CM

## 2024-05-02 DIAGNOSIS — G89.4 CHRONIC PAIN SYNDROME: Chronic | ICD-10-CM

## 2024-05-02 DIAGNOSIS — K21.9 GASTROESOPHAGEAL REFLUX DISEASE, UNSPECIFIED WHETHER ESOPHAGITIS PRESENT: ICD-10-CM

## 2024-05-02 DIAGNOSIS — N32.81 DETRUSOR OVERACTIVITY: ICD-10-CM

## 2024-05-02 DIAGNOSIS — R79.89 OTHER SPECIFIED ABNORMAL FINDINGS OF BLOOD CHEMISTRY: ICD-10-CM

## 2024-05-02 DIAGNOSIS — N31.9 NEUROGENIC BLADDER: ICD-10-CM

## 2024-05-02 DIAGNOSIS — M79.671 CHRONIC PAIN OF BOTH FEET: ICD-10-CM

## 2024-05-02 DIAGNOSIS — R06.02 SHORTNESS OF BREATH: Primary | ICD-10-CM

## 2024-05-02 DIAGNOSIS — R52 INTRACTABLE PAIN: ICD-10-CM

## 2024-05-02 DIAGNOSIS — R94.6 ABNORMAL RESULTS OF THYROID FUNCTION STUDIES: ICD-10-CM

## 2024-05-02 PROCEDURE — 3075F SYST BP GE 130 - 139MM HG: CPT | Mod: HCNC,CPTII,S$GLB, | Performed by: INTERNAL MEDICINE

## 2024-05-02 PROCEDURE — 3288F FALL RISK ASSESSMENT DOCD: CPT | Mod: HCNC,CPTII,S$GLB, | Performed by: INTERNAL MEDICINE

## 2024-05-02 PROCEDURE — 99215 OFFICE O/P EST HI 40 MIN: CPT | Mod: HCNC,S$GLB,, | Performed by: INTERNAL MEDICINE

## 2024-05-02 PROCEDURE — 87088 URINE BACTERIA CULTURE: CPT | Mod: HCNC | Performed by: INTERNAL MEDICINE

## 2024-05-02 PROCEDURE — 1101F PT FALLS ASSESS-DOCD LE1/YR: CPT | Mod: HCNC,CPTII,S$GLB, | Performed by: INTERNAL MEDICINE

## 2024-05-02 PROCEDURE — 3044F HG A1C LEVEL LT 7.0%: CPT | Mod: HCNC,CPTII,S$GLB, | Performed by: INTERNAL MEDICINE

## 2024-05-02 PROCEDURE — 81001 URINALYSIS AUTO W/SCOPE: CPT | Mod: HCNC | Performed by: INTERNAL MEDICINE

## 2024-05-02 PROCEDURE — 87086 URINE CULTURE/COLONY COUNT: CPT | Mod: HCNC | Performed by: INTERNAL MEDICINE

## 2024-05-02 PROCEDURE — 87186 SC STD MICRODIL/AGAR DIL: CPT | Mod: HCNC | Performed by: INTERNAL MEDICINE

## 2024-05-02 PROCEDURE — 99999 PR PBB SHADOW E&M-EST. PATIENT-LVL III: CPT | Mod: PBBFAC,HCNC,, | Performed by: INTERNAL MEDICINE

## 2024-05-02 PROCEDURE — 3078F DIAST BP <80 MM HG: CPT | Mod: HCNC,CPTII,S$GLB, | Performed by: INTERNAL MEDICINE

## 2024-05-02 PROCEDURE — 87077 CULTURE AEROBIC IDENTIFY: CPT | Mod: HCNC | Performed by: INTERNAL MEDICINE

## 2024-05-02 RX ORDER — HYDROXYZINE HYDROCHLORIDE 50 MG/1
50 TABLET, FILM COATED ORAL EVERY 4 HOURS PRN
Qty: 30 TABLET | Refills: 0 | Status: ON HOLD | OUTPATIENT
Start: 2024-05-02 | End: 2024-05-24 | Stop reason: HOSPADM

## 2024-05-02 RX ORDER — ATORVASTATIN CALCIUM 80 MG/1
80 TABLET, FILM COATED ORAL DAILY
Qty: 90 TABLET | Refills: 0 | Status: SHIPPED | OUTPATIENT
Start: 2024-05-02

## 2024-05-02 RX ORDER — DULOXETIN HYDROCHLORIDE 60 MG/1
60 CAPSULE, DELAYED RELEASE ORAL 2 TIMES DAILY
Qty: 180 CAPSULE | Refills: 0 | Status: SHIPPED | OUTPATIENT
Start: 2024-05-02

## 2024-05-02 RX ORDER — FUROSEMIDE 40 MG/1
40 TABLET ORAL DAILY
Qty: 90 TABLET | Refills: 0 | Status: ON HOLD | OUTPATIENT
Start: 2024-05-02 | End: 2024-05-27

## 2024-05-02 RX ORDER — PROMETHAZINE HYDROCHLORIDE 25 MG/1
25 TABLET ORAL EVERY 6 HOURS PRN
Qty: 20 TABLET | Refills: 0 | Status: ON HOLD | OUTPATIENT
Start: 2024-05-02 | End: 2024-05-23

## 2024-05-02 RX ORDER — HYDROCORTISONE 10 MG/1
10 TABLET ORAL NIGHTLY
Qty: 90 TABLET | Refills: 0 | Status: ON HOLD | OUTPATIENT
Start: 2024-05-02 | End: 2024-05-27 | Stop reason: HOSPADM

## 2024-05-02 RX ORDER — TRAZODONE HYDROCHLORIDE 300 MG/1
150 TABLET ORAL NIGHTLY
Qty: 90 TABLET | Refills: 0 | Status: SHIPPED | OUTPATIENT
Start: 2024-05-02 | End: 2024-05-03 | Stop reason: SDUPTHER

## 2024-05-02 RX ORDER — PANTOPRAZOLE SODIUM 40 MG/1
40 TABLET, DELAYED RELEASE ORAL DAILY
Qty: 90 TABLET | Refills: 0 | Status: SHIPPED | OUTPATIENT
Start: 2024-05-02

## 2024-05-02 RX ORDER — LEVOTHYROXINE SODIUM 150 UG/1
150 TABLET ORAL
Qty: 90 TABLET | Refills: 0 | Status: SHIPPED | OUTPATIENT
Start: 2024-05-02 | End: 2025-05-02

## 2024-05-02 RX ORDER — POTASSIUM CHLORIDE 750 MG/1
20 CAPSULE, EXTENDED RELEASE ORAL DAILY
Qty: 180 CAPSULE | Refills: 0 | Status: ON HOLD | OUTPATIENT
Start: 2024-05-02 | End: 2024-05-27 | Stop reason: HOSPADM

## 2024-05-02 RX ORDER — AMOXICILLIN AND CLAVULANATE POTASSIUM 875; 125 MG/1; MG/1
1 TABLET, FILM COATED ORAL 2 TIMES DAILY
Qty: 14 TABLET | Refills: 0 | Status: SHIPPED | OUTPATIENT
Start: 2024-05-02 | End: 2024-05-02

## 2024-05-02 RX ORDER — FLUDROCORTISONE ACETATE 0.1 MG/1
TABLET ORAL
Qty: 15 TABLET | Refills: 5 | Status: SHIPPED | OUTPATIENT
Start: 2024-05-02

## 2024-05-02 RX ORDER — QUETIAPINE FUMARATE 100 MG/1
TABLET, FILM COATED ORAL
Qty: 180 TABLET | Refills: 0 | Status: SHIPPED | OUTPATIENT
Start: 2024-05-02 | End: 2024-05-03 | Stop reason: SDUPTHER

## 2024-05-02 RX ORDER — DARIFENACIN 7.5 MG/1
7.5 TABLET, EXTENDED RELEASE ORAL DAILY
Qty: 30 TABLET | Refills: 0 | Status: ON HOLD | OUTPATIENT
Start: 2024-05-02 | End: 2024-05-24 | Stop reason: HOSPADM

## 2024-05-02 RX ORDER — AMITRIPTYLINE HYDROCHLORIDE 25 MG/1
25 TABLET, FILM COATED ORAL NIGHTLY
Qty: 90 TABLET | Refills: 0 | Status: SHIPPED | OUTPATIENT
Start: 2024-05-02

## 2024-05-02 RX ORDER — AMOXICILLIN AND CLAVULANATE POTASSIUM 875; 125 MG/1; MG/1
1 TABLET, FILM COATED ORAL 2 TIMES DAILY
Qty: 14 TABLET | Refills: 0 | Status: SHIPPED | OUTPATIENT
Start: 2024-05-02 | End: 2024-05-09

## 2024-05-02 NOTE — TELEPHONE ENCOUNTER
No care due was identified.  [facility] Embedded Care Due Messages. Reference number: [id number].   5/02/2024 3:42:20 PM CDT

## 2024-05-02 NOTE — PROGRESS NOTES
Subjective:       Patient ID: Tasha Hawley is a 68 y.o. female.    Chief Complaint: Shortness of Breath    Transitional Care Note    Presents for hospital discharge.     Was recently admitted to SNF from home due to debility, bilateral lymphedema, hypoxic resp. Failure and generalized weakness.   She was doing well at SNF but since being home for 4-5 days she has had cough and SOB. On 2-3 L of oxygen via n/c which is her baseline. She has hx of COPD and does follow with pulm. Has upcoming appt.     She had home health ordered for her at discharge but she states no one has come out yet.   [facility] They declined to readmit pt as she is non-compliant, cancels appts, does not answer the door and is not compliant with treatment options   She also has issues with neurogenic bladder. Had suprapubic catheter recently placed. She believes she has a UTI today.   Has chronic lymphedema and normally has legs wrapped.     Family and/or Caretaker present at visit?  Yes.  Diagnostic tests reviewed/disposition: I have reviewed all completed as well as pending diagnostic tests at the time of discharge.  Disease/illness education: yes  Home health/community services discussion/referrals: Patient does not have home health established from hospital visit.  They do need home health.  If needed, we will set up home health for the patient.   Establishment or re-establishment of referral orders for community resources: will order home health and care ao.   Discussion with other health care providers: Yes needs follow up with urology and cardiology . Also needs follow up with pulmonologist and endocrinologist.     Shortness of Breath  Associated symptoms include leg swelling. Pertinent negatives include no abdominal pain, chest pain, ear pain, headaches or vomiting.     Review of Systems   Constitutional:  Negative for activity change, appetite change and chills.   HENT:  Negative for ear pain, sinus  pressure/congestion and sneezing.    Respiratory:  Positive for cough and shortness of breath.    Cardiovascular:  Positive for leg swelling. Negative for chest pain and palpitations.   Gastrointestinal:  Negative for abdominal distention, abdominal pain, constipation, diarrhea, nausea and vomiting.   Genitourinary:  Negative for dysuria and hematuria.   Musculoskeletal:  Negative for arthralgias, back pain and myalgias.   Neurological:  Negative for dizziness and headaches.   Psychiatric/Behavioral:  Negative for agitation. The patient is not nervous/anxious.          Objective:      Physical Exam  Constitutional:       Appearance: Normal appearance. She is obese.   HENT:      Head: Normocephalic and atraumatic.      Left Ear: Tympanic membrane normal.   Cardiovascular:      Rate and Rhythm: Normal rate and regular rhythm.      Pulses: Normal pulses.      Heart sounds: Normal heart sounds.   Pulmonary:      Effort: Pulmonary effort is normal.      Breath sounds: Normal breath sounds.   Abdominal:      General: Abdomen is flat. Bowel sounds are normal.      Palpations: Abdomen is soft.   Musculoskeletal:         General: Normal range of motion.      Cervical back: Normal range of motion and neck supple.      Right lower leg: Edema present.      Left lower leg: Edema present.   Skin:     General: Skin is warm and dry.   Neurological:      General: No focal deficit present.      Mental Status: She is alert and oriented to person, place, and time. Mental status is at baseline.   Psychiatric:         Mood and Affect: Mood normal.         Assessment:       Problem List Items Addressed This Visit          Neuro    Chronic pain syndrome (Chronic)       Renal/    Stage 3a chronic kidney disease    Suprapubic catheter (Chronic)    Neurogenic bladder (Chronic)    Relevant Orders    Urinalysis Microscopic (Completed)    Urine culture (Completed)       Other    Frequent falls (Chronic)     Other Visit Diagnoses        Shortness of breath    -  Primary    Relevant Orders    X-Ray Chest PA And Lateral (Completed)    EKG 12-lead    Echo (Completed)    Comprehensive Metabolic Panel (Completed)    CBC Auto Differential (Completed)    Hemoglobin A1C (Completed)    TSH (Completed)    IRON AND TIBC (Completed)    Urinalysis, Reflex to Urine Culture Urine, Clean Catch (Completed)    POCT COVID-19 Rapid Screening    POCT Influenza A/B Rapid Antigen    Other specified abnormal findings of blood chemistry        Abnormal results of thyroid function studies        Lymphedema                Plan:       Tasha was seen today for shortness of breath.    Diagnoses and all orders for this visit:    Shortness of breath  -     X-Ray Chest PA And Lateral; Future  -     EKG 12-lead; Future  -     Echo; Future  -     Comprehensive Metabolic Panel; Future  -     CBC Auto Differential; Future  -     Hemoglobin A1C; Future  -     TSH; Future  -     IRON AND TIBC; Future  -     Urinalysis, Reflex to Urine Culture Urine, Clean Catch  -     POCT COVID-19 Rapid Screening  -     POCT Influenza A/B Rapid Antigen  -     amoxicillin-clavulanate 875-125mg (AUGMENTIN) 875-125 mg per tablet; Take 1 tablet by mouth 2 (two) times daily. for 7 days  Start Augmentin, possible PNA vs bronchitis vs COPD exacerbation  given worsening cough.   Check CXR and 2 D echo.   Covid and flu were negative in clinic.   Follow up pulmonology in a few weeks.   ER precautions given.     Abnormal results of thyroid function studies  Check labs today     Lymphedema  Try to arrange wound care through home health.     Neurogenic bladder  Suprapubic catheter  -     Urinalysis Microscopic  -     Urine culture  Check urine today   Follow  urology today.     Frequent falls  Chronic pain syndrome  Order PT through home health     Stage 3a chronic kidney disease  Check labs today     Will arrange for home health   Also order care at home for patient to be seen at home.          Merly Garcia MD

## 2024-05-03 ENCOUNTER — TELEPHONE (OUTPATIENT)
Dept: INTERNAL MEDICINE | Facility: CLINIC | Age: 69
End: 2024-05-03
Payer: MEDICARE

## 2024-05-03 DIAGNOSIS — G47.01 INSOMNIA DUE TO MEDICAL CONDITION: Chronic | ICD-10-CM

## 2024-05-03 LAB
BACTERIA #/AREA URNS AUTO: ABNORMAL /HPF
BILIRUB UR QL STRIP: NEGATIVE
CLARITY UR REFRACT.AUTO: ABNORMAL
COLOR UR AUTO: YELLOW
GLUCOSE UR QL STRIP: NEGATIVE
HGB UR QL STRIP: ABNORMAL
HYALINE CASTS UR QL AUTO: 0 /LPF
KETONES UR QL STRIP: NEGATIVE
LEUKOCYTE ESTERASE UR QL STRIP: ABNORMAL
MICROSCOPIC COMMENT: ABNORMAL
NITRITE UR QL STRIP: NEGATIVE
PH UR STRIP: 7 [PH] (ref 5–8)
PROT UR QL STRIP: ABNORMAL
RBC #/AREA URNS AUTO: 46 /HPF (ref 0–4)
SP GR UR STRIP: 1.02 (ref 1–1.03)
URN SPEC COLLECT METH UR: ABNORMAL
WBC #/AREA URNS AUTO: >100 /HPF (ref 0–5)
WBC CLUMPS UR QL AUTO: ABNORMAL
YEAST UR QL AUTO: ABNORMAL

## 2024-05-03 RX ORDER — QUETIAPINE FUMARATE 100 MG/1
TABLET, FILM COATED ORAL
Qty: 180 TABLET | Refills: 0 | Status: ON HOLD | OUTPATIENT
Start: 2024-05-03 | End: 2024-05-27

## 2024-05-03 RX ORDER — TRAZODONE HYDROCHLORIDE 300 MG/1
150 TABLET ORAL NIGHTLY
Qty: 90 TABLET | Refills: 0 | Status: ON HOLD | OUTPATIENT
Start: 2024-05-03 | End: 2024-05-24 | Stop reason: HOSPADM

## 2024-05-03 NOTE — TELEPHONE ENCOUNTER
----- Message from Tiago Rojas sent at 5/3/2024  2:08 PM CDT -----  Regarding: Patient needs home health  Contact: Patient [phone]  1MEDICALADVICE     Patient is calling for Medical Advice regarding: Needs home health to come back to her home asap    How long has patient had these symptoms:    Pharmacy name and phone#:    Would like response via [facility]:  ##    Comments:    Patient request to speak with:  Nurse Bernabe    Why?:  Home health to come back to residence

## 2024-05-03 NOTE — TELEPHONE ENCOUNTER
Contacted [facility], they informed me that patient's insurance didn't approve needed visits so they discharged the pt. They also informed me that they are unable to readmit her at the moment because they have reached their cap on the amount of [employer] patients that they can admit. Contacted and spoke with pt and her , they informed me that they contacted [facility] in regards to HH and were told that they need more information on the HH order so that it can be authorized.     I contacted [facility] and they need the CPT and diagnosis code, # of visits needed, and how long the HH request/referral is valid. They will also need the HH facility's name, NPI #, and physical & mailing address. Can HH please be reordered for pt because  states they need immediate assistance in the home??    Josh TREVIZO

## 2024-05-06 LAB — BACTERIA UR CULT: ABNORMAL

## 2024-05-07 ENCOUNTER — TELEPHONE (OUTPATIENT)
Dept: INTERNAL MEDICINE | Facility: CLINIC | Age: 69
End: 2024-05-07
Payer: MEDICARE

## 2024-05-07 ENCOUNTER — PATIENT MESSAGE (OUTPATIENT)
Dept: INTERNAL MEDICINE | Facility: CLINIC | Age: 69
End: 2024-05-07
Payer: MEDICARE

## 2024-05-07 DIAGNOSIS — I89.0 LYMPHEDEMA OF BOTH LOWER EXTREMITIES: ICD-10-CM

## 2024-05-07 DIAGNOSIS — Z93.59 SUPRAPUBIC CATHETER: Primary | Chronic | ICD-10-CM

## 2024-05-07 NOTE — TELEPHONE ENCOUNTER
----- Message from Mitzi Bryson sent at 5/6/2024 11:37 AM CDT -----  Contact: [phone]  1MEDICALADVICE     Patient is calling for Medical Advice regarding: Patient says she need a PA for home health and would like to know what is going on with it and she call last week about this, p,ease call and advise.

## 2024-05-07 NOTE — TELEPHONE ENCOUNTER
----- Message from Argelia Triplett sent at 5/6/2024  4:13 PM CDT -----  Contact: [phone] Patient  Patient would like to get medical advice.  Symptoms (please be specific):   Pt states she needs home health orders to change her catheter and her nithin boot  How long have you had these symptoms: N/A  Would you like a call back, or a response through your [facility] portal?:   call back  Pharmacy name and phone # (copy from chart):   N/A  Comments:

## 2024-05-07 NOTE — TELEPHONE ENCOUNTER
Patient needs new home health orders. Patient states she is out of all supplies to change the bandages on her legs. Please advise

## 2024-05-08 ENCOUNTER — TELEPHONE (OUTPATIENT)
Dept: INTERNAL MEDICINE | Facility: CLINIC | Age: 69
End: 2024-05-08

## 2024-05-08 ENCOUNTER — HOSPITAL ENCOUNTER (EMERGENCY)
Facility: HOSPITAL | Age: 69
Discharge: HOME | End: 2024-05-08
Attending: EMERGENCY MEDICINE
Payer: MEDICARE

## 2024-05-08 VITALS
WEIGHT: 223 LBS | DIASTOLIC BLOOD PRESSURE: 58 MMHG | RESPIRATION RATE: 27 BRPM | HEART RATE: 76 BPM | OXYGEN SATURATION: 100 % | TEMPERATURE: 97 F | BODY MASS INDEX: 37.11 KG/M2 | SYSTOLIC BLOOD PRESSURE: 134 MMHG

## 2024-05-08 DIAGNOSIS — I89.0 LYMPHEDEMA OF LOWER EXTREMITY: ICD-10-CM

## 2024-05-08 DIAGNOSIS — N39.0 URINARY TRACT INFECTION ASSOCIATED WITH CYSTOSTOMY CATHETER, INITIAL ENCOUNTER: Primary | ICD-10-CM

## 2024-05-08 DIAGNOSIS — T83.510A URINARY TRACT INFECTION ASSOCIATED WITH CYSTOSTOMY CATHETER, INITIAL ENCOUNTER: Primary | ICD-10-CM

## 2024-05-08 LAB
ALBUMIN SERPL BCP-MCNC: 3.6 G/DL (ref 3.5–5.2)
ALP SERPL-CCNC: 149 U/L (ref 55–135)
ALT SERPL W/O P-5'-P-CCNC: 17 U/L (ref 10–44)
ANION GAP SERPL CALC-SCNC: 11 MMOL/L (ref 8–16)
AST SERPL-CCNC: 17 U/L (ref 10–40)
BACTERIA #/AREA URNS HPF: ABNORMAL /HPF
BASOPHILS # BLD AUTO: 0.01 K/UL (ref 0–0.2)
BASOPHILS NFR BLD: 0.2 % (ref 0–1.9)
BILIRUB SERPL-MCNC: 0.3 MG/DL (ref 0.1–1)
BILIRUB UR QL STRIP: NEGATIVE
BUN SERPL-MCNC: 16 MG/DL (ref 8–23)
CALCIUM SERPL-MCNC: 9.5 MG/DL (ref 8.7–10.5)
CHLORIDE SERPL-SCNC: 100 MMOL/L (ref 95–110)
CLARITY UR: CLEAR
CO2 SERPL-SCNC: 28 MMOL/L (ref 23–29)
COLOR UR: COLORLESS
CREAT SERPL-MCNC: 1 MG/DL (ref 0.5–1.4)
DIFFERENTIAL METHOD BLD: ABNORMAL
EOSINOPHIL # BLD AUTO: 0.3 K/UL (ref 0–0.5)
EOSINOPHIL NFR BLD: 5 % (ref 0–8)
ERYTHROCYTE [DISTWIDTH] IN BLOOD BY AUTOMATED COUNT: 13.8 % (ref 11.5–14.5)
EST. GFR  (NO RACE VARIABLE): >60 ML/MIN/1.73 M^2
GLUCOSE SERPL-MCNC: 101 MG/DL (ref 70–110)
GLUCOSE UR QL STRIP: NEGATIVE
HCT VFR BLD AUTO: 36.8 % (ref 37–48.5)
HGB BLD-MCNC: 11.6 G/DL (ref 12–16)
HGB UR QL STRIP: ABNORMAL
IMM GRANULOCYTES # BLD AUTO: 0.02 K/UL (ref 0–0.04)
IMM GRANULOCYTES NFR BLD AUTO: 0.3 % (ref 0–0.5)
KETONES UR QL STRIP: NEGATIVE
LACTATE SERPL-SCNC: 1.4 MMOL/L (ref 0.5–2.2)
LEUKOCYTE ESTERASE UR QL STRIP: ABNORMAL
LYMPHOCYTES # BLD AUTO: 1.4 K/UL (ref 1–4.8)
LYMPHOCYTES NFR BLD: 22.5 % (ref 18–48)
MCH RBC QN AUTO: 27.6 PG (ref 27–31)
MCHC RBC AUTO-ENTMCNC: 31.5 G/DL (ref 32–36)
MCV RBC AUTO: 88 FL (ref 82–98)
MICROSCOPIC COMMENT: ABNORMAL
MONOCYTES # BLD AUTO: 0.5 K/UL (ref 0.3–1)
MONOCYTES NFR BLD: 8 % (ref 4–15)
NEUTROPHILS # BLD AUTO: 4.1 K/UL (ref 1.8–7.7)
NEUTROPHILS NFR BLD: 64 % (ref 38–73)
NITRITE UR QL STRIP: NEGATIVE
NRBC BLD-RTO: 0 /100 WBC
PH UR STRIP: 7 [PH] (ref 5–8)
PLATELET # BLD AUTO: 221 K/UL (ref 150–450)
PMV BLD AUTO: 9.7 FL (ref 9.2–12.9)
POTASSIUM SERPL-SCNC: 4.2 MMOL/L (ref 3.5–5.1)
PROT SERPL-MCNC: 7.5 G/DL (ref 6–8.4)
PROT UR QL STRIP: ABNORMAL
RBC # BLD AUTO: 4.2 M/UL (ref 4–5.4)
RBC #/AREA URNS HPF: 3 /HPF (ref 0–4)
SODIUM SERPL-SCNC: 139 MMOL/L (ref 136–145)
SP GR UR STRIP: 1 (ref 1–1.03)
URN SPEC COLLECT METH UR: ABNORMAL
UROBILINOGEN UR STRIP-ACNC: NEGATIVE EU/DL
WBC # BLD AUTO: 6.4 K/UL (ref 3.9–12.7)
WBC #/AREA URNS HPF: 11 /HPF (ref 0–5)
YEAST URNS QL MICRO: ABNORMAL

## 2024-05-08 PROCEDURE — 25000003 PHARM REV CODE 250: Mod: HCNC

## 2024-05-08 PROCEDURE — 81000 URINALYSIS NONAUTO W/SCOPE: CPT | Mod: HCNC

## 2024-05-08 PROCEDURE — 87106 FUNGI IDENTIFICATION YEAST: CPT | Mod: HCNC

## 2024-05-08 PROCEDURE — 87040 BLOOD CULTURE FOR BACTERIA: CPT | Mod: HCNC | Performed by: EMERGENCY MEDICINE

## 2024-05-08 PROCEDURE — 96365 THER/PROPH/DIAG IV INF INIT: CPT | Mod: HCNC

## 2024-05-08 PROCEDURE — 87088 URINE BACTERIA CULTURE: CPT | Mod: HCNC

## 2024-05-08 PROCEDURE — 63600175 PHARM REV CODE 636 W HCPCS: Mod: HCNC | Performed by: EMERGENCY MEDICINE

## 2024-05-08 PROCEDURE — 83605 ASSAY OF LACTIC ACID: CPT | Mod: HCNC | Performed by: EMERGENCY MEDICINE

## 2024-05-08 PROCEDURE — 87086 URINE CULTURE/COLONY COUNT: CPT | Mod: HCNC

## 2024-05-08 PROCEDURE — 80053 COMPREHEN METABOLIC PANEL: CPT | Mod: HCNC | Performed by: EMERGENCY MEDICINE

## 2024-05-08 PROCEDURE — 85025 COMPLETE CBC W/AUTO DIFF WBC: CPT | Mod: HCNC | Performed by: EMERGENCY MEDICINE

## 2024-05-08 PROCEDURE — 99284 EMERGENCY DEPT VISIT MOD MDM: CPT | Mod: HCNC,25

## 2024-05-08 RX ORDER — ACETAMINOPHEN 500 MG
1000 TABLET ORAL
Status: COMPLETED | OUTPATIENT
Start: 2024-05-08 | End: 2024-05-08

## 2024-05-08 RX ORDER — LEVOFLOXACIN 5 MG/ML
750 INJECTION, SOLUTION INTRAVENOUS
Status: COMPLETED | OUTPATIENT
Start: 2024-05-08 | End: 2024-05-08

## 2024-05-08 RX ADMIN — LEVOFLOXACIN 750 MG: 750 INJECTION, SOLUTION INTRAVENOUS at 06:05

## 2024-05-08 RX ADMIN — ACETAMINOPHEN 1000 MG: 500 TABLET ORAL at 05:05

## 2024-05-08 NOTE — TELEPHONE ENCOUNTER
----- Message from Monica Guerin sent at 5/8/2024  1:39 PM CDT -----  Regarding: pt  advice  Contact: [phone]  Pt calling in regards to speaking to nurse in regards to needing Home health back on schedule and getting pt tube change , tube haven't been changed in 4 weeks . Pls call

## 2024-05-08 NOTE — ED PROVIDER NOTES
Encounter Date: 5/8/2024       History     Chief Complaint   Patient presents with    Urinary Tract Infection     Pt was sent by nurse for increased WBC. Pt has meadows-- currently being tx for UTI with no improvement. Pt reports subjective fever, sob.      Patient is a 68-year-old female sent to the ED for concerns of unresolved urinary tract infection.  She has a suprapubic catheter and has been taking Augmentin over the past few days.  She reports continued suprapubic pain.  Patient reports subjective fever.  She says her catheter has not been changed recently.  No nausea or vomiting.  She also complains of increased swelling to both of her legs.      Review of patient's allergies indicates:   Allergen Reactions    (d)-limonene flavor      Other reaction(s): difficult intubation  Other reaction(s): Difficulty breathing    Benadryl [diphenhydramine hcl] Shortness Of Breath    Fentanyl Itching, Nausea And Vomiting and Swelling             Imitrex [sumatriptan succinate] Shortness Of Breath    Oxycodone-acetaminophen Shortness Of Breath    Sulfamethoxazole-trimethoprim Anaphylaxis    Topiramate Shortness Of Breath    Vancomycin Anaphylaxis     Rash    Butorphanol tartrate     Evening primrose (oenothera biennis)     Latex     Pregabalin Other (See Comments)     tremors    Propoxyphene n-acetaminophen      Other reaction(s): Difficulty breathing    Sumatriptan     White petrolatum-zinc     Zinc acetate     Zinc oxide-white petrolatum      Other reaction(s): Difficulty breathing    Latex, natural rubber Itching and Rash    Phenytoin Rash and Other (See Comments)     Trouble breathing     Past Medical History:   Diagnosis Date    Anxiety     Arthritis     Bilateral lower extremity edema     severe chronic    Carotid artery occlusion     Cataract     CHF (congestive heart failure)     Coronary artery disease     subtotalled LAD with collateral    Depression     Fever blister     Hard of hearing     Hypokalemia 01/09/2023     Hyponatremia 02/04/2022    Hypothyroid     Iron deficiency anemia     Lumbar radiculopathy     with chronic pain    Ocular migraine     Other emphysema 05/22/2023    Renal disorder     Sleep apnea     cpap     Past Surgical History:   Procedure Laterality Date    ADENOIDECTOMY      BACK SURGERY      x 12    CARDIAC CATHETERIZATION  2016    subtotalled LAD with right to left collaterals    CATARACT EXTRACTION W/  INTRAOCULAR LENS IMPLANT Left     Dr Coleman     CYSTOSCOPIC LITHOLAPAXY N/A 6/27/2019    Procedure: CYSTOLITHOLAPAXY;  Surgeon: Shireen Mayo MD;  Location: [facility] OR 2ND FLR;  Service: Urology;  Laterality: N/A;    CYSTOSCOPIC LITHOLAPAXY N/A 9/3/2019    Procedure: CYSTOLITHOLAPAXY;  Surgeon: Shireen Mayo MD;  Location: [facility] OR 2ND FLR;  Service: Urology;  Laterality: N/A;    CYSTOSCOPY N/A 7/13/2021    Procedure: CYSTOSCOPY;  Surgeon: Shireen Mayo MD;  Location: [facility] OR 1ST FLR;  Service: Urology;  Laterality: N/A;    CYSTOSCOPY  11/16/2021    Procedure: CYSTOSCOPY;  Surgeon: Shireen Mayo MD;  Location: [facility] OR 1ST FLR;  Service: Urology;;    CYSTOSCOPY  7/19/2022    Procedure: CYSTOSCOPY;  Surgeon: Shireen Mayo MD;  Location: [facility] OR 1ST FLR;  Service: Urology;;    CYSTOSCOPY WITH INJECTION OF PERIURETHRAL BULKING AGENT  7/19/2022    Procedure: CYSTOSCOPY, WITH PERIURETHRAL BULKING AGENT INJECTION-MACROPLASTIQUE;  Surgeon: Shireen Mayo MD;  Location: [facility] OR 1ST FLR;  Service: Urology;;    CYSTOSCOPY WITH INJECTION OF PERIURETHRAL BULKING AGENT N/A 3/28/2023    Procedure: CYSTOSCOPY, WITH PERIURETHRAL BULKING AGENT INJECTION;  Surgeon: Shireen Mayo MD;  Location: [facility] OR 1ST FLR;  Service: Urology;  Laterality: N/A;  [place]    CYSTOSCOPY WITH INJECTION OF PERIURETHRAL BULKING AGENT N/A 11/14/2023    Procedure: CYSTOSCOPY, WITH PERIURETHRAL BULKING AGENT INJECTION;  Surgeon: Shireen Mayo MD;  Location: [facility] OR 1ST FLR;  Service: Urology;  Laterality: N/A;     CYSTOSCOPY,WITH BOTULINUM TOXIN INJECTION N/A 12/13/2022    Procedure: CYSTOSCOPY,WITH BOTULINUM TOXIN INJECTION;  Surgeon: Shireen Mayo MD;  Location: [facility] OR 1ST FLR;  Service: Urology;  Laterality: N/A;  300 U    CYSTOSCOPY,WITH BOTULINUM TOXIN INJECTION N/A 3/28/2023    Procedure: CYSTOSCOPY,WITH BOTULINUM TOXIN INJECTION;  Surgeon: Shireen Mayo MD;  Location: [facility] OR 1ST FLR;  Service: Urology;  Laterality: N/A;  45 MIN.    300 UNITS    ESOPHAGOGASTRODUODENOSCOPY N/A 5/23/2018    Procedure: ESOPHAGOGASTRODUODENOSCOPY (EGD);  Surgeon: Prince Vance MD;  Location: [facility] ENDO (4TH FLR);  Service: Endoscopy;  Laterality: N/A;  r/s 'd per Dr. Vance due to family emergency- ER    ESOPHAGOGASTRODUODENOSCOPY N/A 6/23/2023    Procedure: EGD (ESOPHAGOGASTRODUODENOSCOPY);  Surgeon: Juaquin Barry MD;  Location: [facility] ENDO (2ND FLR);  Service: Endoscopy;  Laterality: N/A;  Refferal: PRINCE VANCE  Order  tele enc 5/18/23  dx: history of a Nissen fundoplication  Additional Scheduling Information:  On home oxygen 2nd floor for airway protection also with a pain pump elevated BMI close to 40   Prep Gastroparesis   ins    HYSTERECTOMY  1975    endometriosis    INJECTION OF BOTULINUM TOXIN TYPE A  7/13/2021    Procedure: INJECTION, BOTULINUM TOXIN, 200units;  Surgeon: Shireen Mayo MD;  [facility];  Service: Urology;;    INJECTION OF BOTULINUM TOXIN TYPE A  11/16/2021    Procedure: INJECTION, BOTULINUM TOXIN, 200units;  Surgeon: Shireen Mayo MD;  [facility];  Service: Urology;;    INJECTION OF BOTULINUM TOXIN TYPE A  7/19/2022    Procedure: INJECTION, BOTULINUM TOXIN, 300 units ;  Surgeon: Shireen Mayo MD;  Location: [facility] OR [facility] FLR;  Service: Urology;;    INJECTION OF BOTULINUM TOXIN TYPE A N/A 11/14/2023    Procedure: INJECTION, BOTULINUM TOXIN, TYPE A;  Surgeon: Shireen Mayo MD;  Location: [facility] OR [address] [place] [address];  Service: Urology;  Laterality: N/A;     INSERTION, SUPRAPUBIC CATHETER N/A 12/13/2022    Procedure: INSERTION, SUPRAPUBIC CATHETER;  Surgeon: Shireen Mayo MD;  Location: [facility] OR 1ST FLR;  Service: Urology;  Laterality: N/A;  exchange    INSERTION, SUPRAPUBIC CATHETER N/A 11/14/2023    Procedure: INSERTION, SUPRAPUBIC CATHETER;  Surgeon: Shireen Mayo MD;  [facility];  Service: Urology;  Laterality: N/A;  EXCHANGE of s/p tube    pain pump placement      SQ Dilaudid Pump managed by Dr. Hillman, Pain Management    REMOVAL OF BONE SPUR OF FOOT Bilateral 9/16/2022    Procedure: EXCISION ARTHRITIC BONE, BILATERAL FOOT;  Surgeon: Adam Mcguire DPM;  Location: [facility] OR;  Service: Podiatry;  Laterality: Bilateral;    REPLACEMENT OF BACLOFEN PUMP N/A 8/2/2023    Procedure: REPLACEMENT, BACLOFEN PUMP;  Surgeon: Smitha Condon MD;  Location: [facility] OR 2ND FLR;  Service: Neurosurgery;  Laterality: N/A;  Anes: Gen  Blood: Type & Screen  Pos: Left Lat  Intrathecal Pump  Bed: Reg Reversed  Rad: C-arm  Pump: 40 cc, [employer]    REPLACEMENT OF CATHETER N/A 10/31/2019    Procedure: REPLACEMENT, CATHETER-SUPRAPUBIC;  Surgeon: Shireen Mayo MD;  Location: [facility] OR [address] [place] [address];  Service: Urology;  Laterality: N/A;    SPINAL CORD STIMULATOR REMOVAL      before [facility]    SPINE SURGERY  5-13-13    CERVICAL FUSION    TONSILLECTOMY       Family History   Problem Relation Name Age of Onset    Cancer Mother  55        breast    Cancer Father          esophagus,had laryngectomy    Esophageal cancer Father      Parkinsonism Maternal Grandmother      Tremor Maternal Grandmother      No Known Problems Brother      No Known Problems Brother      Heart disease Maternal Uncle klarissa     Colon cancer Maternal Uncle klarissa         Less than 60    No Known Problems Sister      No Known Problems Maternal Aunt      Cirrhosis Paternal Aunt          ETOH    Liver disease Paternal Aunt          ETOH    Liver disease Paternal Uncle          ETOH    Cirrhosis Paternal  "Uncle          ETOH    No Known Problems Maternal Grandfather      No Known Problems Paternal Grandmother      No Known Problems Paternal Grandfather      Melanoma Neg Hx      Amblyopia Neg Hx      Blindness Neg Hx      Cataracts Neg Hx      Diabetes Neg Hx      Glaucoma Neg Hx      Hypertension Neg Hx      Macular degeneration Neg Hx      Retinal detachment Neg Hx      Strabismus Neg Hx      Stroke Neg Hx      Thyroid disease Neg Hx      Celiac disease Neg Hx      Colon polyps Neg Hx      Cystic fibrosis Neg Hx      Crohn's disease Neg Hx      Inflammatory bowel disease Neg Hx      Liver cancer Neg Hx      Rectal cancer Neg Hx      Stomach cancer Neg Hx      Ulcerative colitis Neg Hx      Lymphoma Neg Hx       Social History     Tobacco Use    Smoking status: Never    Smokeless tobacco: Never   Substance Use Topics    Alcohol use: Never    Drug use: Yes     Types: Marijuana     Comment: "medical marijuana gummies"     Review of Systems   Constitutional:         Subjective fever.   Gastrointestinal:  Positive for abdominal pain. Negative for vomiting.   All other systems reviewed and are negative.      Physical Exam     Initial Vitals [05/08/24 1534]   BP Pulse Resp Temp SpO2   (!) 134/58 80 20 97.4 °F (36.3 °C) 100 %      MAP       --         Physical Exam    Nursing note and vitals reviewed.  Constitutional: No distress.   HENT:   Head: Atraumatic.   Neck: Neck supple.   Cardiovascular:  Normal rate, regular rhythm and normal heart sounds.           Pulmonary/Chest: Breath sounds normal.   Abdominal: Abdomen is soft. Bowel sounds are normal.   Suprapubic tenderness without guarding or rebound.   Musculoskeletal:      Cervical back: Neck supple.      Comments: Bilateral lower extremity edema.     Neurological: She is alert.   Skin: Skin is warm and dry.         ED Course   Procedures  Labs Reviewed   URINALYSIS, REFLEX TO URINE CULTURE - Abnormal; Notable for the following components:       Result Value    Color, " UA Colorless (*)     Protein, UA Trace (*)     Occult Blood UA 3+ (*)     Leukocytes, UA 2+ (*)     All other components within normal limits    Narrative:     Specimen Source->Urine   CBC W/ AUTO DIFFERENTIAL - Abnormal; Notable for the following components:    Hemoglobin 11.6 (*)     Hematocrit 36.8 (*)     MCHC 31.5 (*)     All other components within normal limits   COMPREHENSIVE METABOLIC PANEL - Abnormal; Notable for the following components:    Alkaline Phosphatase 149 (*)     All other components within normal limits   URINALYSIS MICROSCOPIC - Abnormal; Notable for the following components:    WBC, UA 11 (*)     Yeast, UA Rare (*)     All other components within normal limits    Narrative:     Specimen Source->Urine   CULTURE, BLOOD   CULTURE, BLOOD   CULTURE, URINE   LACTIC ACID, PLASMA          Imaging Results    None          Medications   levoFLOXacin 750 mg/150 mL IVPB 750 mg (750 mg Intravenous New Bag 5/8/24 1847)   acetaminophen tablet 1,000 mg (1,000 mg Oral Given 5/8/24 1733)     Medical Decision Making  DDx :  Including but not limited to :  Cystitis, urinary catheter malfunction, urosepsis.    Emergent evaluation of a 68-year-old female sent to the ED for recent urinalysis that showed >100 WBC.  Patient has no fever, normal blood pressures and a normal lactic acid.  Patient had concerns that she may be uroseptic, but I have assured her that she is not.  Urinalysis here shows only 11 WBC.  She was given an IV dose of Levaquin here in the ED and I will have her continue her current antibiotic which seems to be resolving her urinary tract infection.  She should follow up with the primary physician as soon as able but may return to the emergency department for any further urgent concerns.    Amount and/or Complexity of Data Reviewed  Labs: ordered.     Details: CBC with a normal white blood cell count.  Urinalysis with 11 WBC.  Lactic acid is normal at 1.4.    Risk  Prescription drug  management.                                      Clinical Impression:  Final diagnoses:  [T83.510A, N39.0] Urinary tract infection associated with cystostomy catheter, initial encounter (Primary)  [I89.0] Lymphedema of lower extremity          ED Disposition Condition    Discharge Stable          ED Prescriptions    None       Follow-up Information       Follow up With Specialties Details Why Contact Info    Mesfin Hodges II, MD Internal Medicine Schedule an appointment as soon as possible for a visit   [address]  [place] [address]  [phone]               Sanket Castro MD  05/08/24 3956

## 2024-05-08 NOTE — TELEPHONE ENCOUNTER
Patient and family called back her nithin boot has not been changed, she left the Assisted Living, she states, 'I did my time'. Urinary Catheter not changed for 4 weeks at least, she now has a UTI >100 count WBC, explained how important it is to change out the catheter and get tx for UTI and have a plan moving forward for her care. Her sister in law is bringing her to ER, patient just wanted HH, unable to do that without a physician assessment and orders, concerned about her UTI results. FYI to Cardiology she has as ECHO scheduled for tomorrow do you want it done in person if she is to stay over night? Dr Bernard if you see Cards provider can you in box him/her?

## 2024-05-08 NOTE — TELEPHONE ENCOUNTER
First, she shouldn't have left the assisted living. She's too frail to care for herself and her  can't do it.  She is up against the limit of what home health can do for her.    Second, she needs to come for a visit with all her pill bottles.  We can move on from there, but can't do much without a visit.    D

## 2024-05-10 ENCOUNTER — PROCEDURE VISIT (OUTPATIENT)
Dept: PODIATRY | Facility: CLINIC | Age: 69
End: 2024-05-10
Payer: MEDICARE

## 2024-05-10 ENCOUNTER — TELEPHONE (OUTPATIENT)
Dept: INTERNAL MEDICINE | Facility: CLINIC | Age: 69
End: 2024-05-10
Payer: MEDICARE

## 2024-05-10 VITALS
WEIGHT: 223.13 LBS | TEMPERATURE: 98 F | RESPIRATION RATE: 16 BRPM | SYSTOLIC BLOOD PRESSURE: 100 MMHG | OXYGEN SATURATION: 92 % | HEIGHT: 65 IN | BODY MASS INDEX: 37.18 KG/M2 | DIASTOLIC BLOOD PRESSURE: 64 MMHG | HEART RATE: 75 BPM

## 2024-05-10 DIAGNOSIS — I89.0 LYMPHEDEMA OF BOTH LOWER EXTREMITIES: Primary | ICD-10-CM

## 2024-05-10 DIAGNOSIS — T14.8XXA BLISTER OF SKIN: ICD-10-CM

## 2024-05-10 DIAGNOSIS — R53.81 DEBILITY: ICD-10-CM

## 2024-05-10 LAB — BACTERIA UR CULT: ABNORMAL

## 2024-05-10 PROCEDURE — 29580 STRAPPING UNNA BOOT: CPT | Mod: 50,S$GLB,, | Performed by: STUDENT IN AN ORGANIZED HEALTH CARE EDUCATION/TRAINING PROGRAM

## 2024-05-10 PROCEDURE — 99213 OFFICE O/P EST LOW 20 MIN: CPT | Mod: 25,S$GLB,, | Performed by: STUDENT IN AN ORGANIZED HEALTH CARE EDUCATION/TRAINING PROGRAM

## 2024-05-10 NOTE — TELEPHONE ENCOUNTER
Informed pt about her echo results per Dr. Bernard. Pt wants to know is there anything to be concerned about? If so, what's the next steps to take?    Josh TREVIZO

## 2024-05-10 NOTE — TELEPHONE ENCOUNTER
Left Ventricle: The left ventricle is normal in size. Normal wall thickness. There is normal systolic function. Biplane (2D) method of discs ejection fraction is 69%. Grade I diastolic dysfunction.    Right Ventricle: Normal right ventricular cavity size. Systolic function is normal. TAPSE is 3.66 cm.    Normal left atrial size. The left atrium volume index is 28.1 mL/m2.    Normal right atrial size.    The aortic valve is structurally normal. There is normal leaflet mobility.    The mitral valve is structurally normal. There is normal leaflet mobility.    The tricuspid valve is structurally normal. There is normal leaflet mobility.      Looks like she has some diastolic dysfunction

## 2024-05-10 NOTE — PROCEDURES
Subjective:     Patient    Tasha Hawley is a 68 y.o. female.    Problem    09/21/23: Previously followed outpatient by Dr Mcguire. 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 Has ongoing right 1st toe pain, poorly controlled on opioids. Uses surgical shoes. Also has home health wrapping her legs every other day for lymphedema, does not have a physician following her for lymphedema.     10/02/23: Stopped amitriptyline due to side effects. Requesting refill on tramadol. Has followup with pain management in 2 weeks.     12/28/23: Returns for severe bilateral lower leg and foot pain, has been having falls lately and cannot walk. Now starting lymphedema therapy and so the insurance will not also cover HH dressing changes.     01/17/24: Lymphedema therapy changing dressings every week. On duloxetine 60 mg/day, Norco 10, tramadol with ongoing severe pain. More alert than at last visit. Patient continues to have multiple falls per week,  reports difficulty managing her healthcare needs at home by himself.     03/15/24: Multiple falls and presentations to ED with hospitalization and evaluation by orthopedic surgery since last visit. Ongoing chronic pain, seen by pain medicine last week. Now has HH wound care and PT. Known fractures of both feet in surgical shoes.     05/10/24: Returns for unna boots, no longer has HH.     History    History obtained from patient and review of medical records.     Past Medical History:   Diagnosis Date    Anxiety     Arthritis     Bilateral lower extremity edema     severe chronic    Carotid artery occlusion     Cataract     CHF (congestive heart failure)     Coronary artery disease     subtotalled LAD with collateral    Depression     Fever blister     Hard of hearing     Hypokalemia 01/09/2023    Hyponatremia 02/04/2022    Hypothyroid     Iron deficiency anemia     Lumbar  radiculopathy     with chronic pain    Ocular migraine     Other emphysema 05/22/2023    Renal disorder     Sleep apnea     cpap       Past Surgical History:   Procedure Laterality Date    ADENOIDECTOMY      BACK SURGERY      x 12    CARDIAC CATHETERIZATION  2016    subtotalled LAD with right to left collaterals    CATARACT EXTRACTION W/  INTRAOCULAR LENS IMPLANT Left     Dr Coleman     CYSTOSCOPIC LITHOLAPAXY N/A 6/27/2019    Procedure: CYSTOLITHOLAPAXY;  Surgeon: Shireen Mayo MD;  Location: [facility] OR 2ND FLR;  Service: Urology;  Laterality: N/A;    CYSTOSCOPIC LITHOLAPAXY N/A 9/3/2019    Procedure: CYSTOLITHOLAPAXY;  Surgeon: Shireen Mayo MD;  Location: [facility] OR 2ND FLR;  Service: Urology;  Laterality: N/A;    CYSTOSCOPY N/A 7/13/2021    Procedure: CYSTOSCOPY;  Surgeon: Shireen Mayo MD;  Location: [facility] OR 1ST FLR;  Service: Urology;  Laterality: N/A;    CYSTOSCOPY  11/16/2021    Procedure: CYSTOSCOPY;  Surgeon: Shireen Mayo MD;  Location: [facility] OR 1ST FLR;  Service: Urology;;    CYSTOSCOPY  7/19/2022    Procedure: CYSTOSCOPY;  Surgeon: Shireen Mayo MD;  Location: [facility] OR 1ST FLR;  Service: Urology;;    CYSTOSCOPY WITH INJECTION OF PERIURETHRAL BULKING AGENT  7/19/2022    Procedure: CYSTOSCOPY, WITH PERIURETHRAL BULKING AGENT INJECTION-MACROPLASTIQUE;  Surgeon: Shireen Mayo MD;  Location: [facility] OR 1ST FLR;  Service: Urology;;    CYSTOSCOPY WITH INJECTION OF PERIURETHRAL BULKING AGENT N/A 3/28/2023    Procedure: CYSTOSCOPY, WITH PERIURETHRAL BULKING AGENT INJECTION;  Surgeon: Shireen Mayo MD;  Location: [facility] OR 1ST FLR;  Service: Urology;  Laterality: N/A;  [place]    CYSTOSCOPY WITH INJECTION OF PERIURETHRAL BULKING AGENT N/A 11/14/2023    Procedure: CYSTOSCOPY, WITH PERIURETHRAL BULKING AGENT INJECTION;  Surgeon: Shireen Mayo MD;  Location: [facility] OR 1ST FLR;  Service: Urology;  Laterality: N/A;    CYSTOSCOPY,WITH BOTULINUM TOXIN INJECTION N/A 12/13/2022    Procedure:  CYSTOSCOPY,WITH BOTULINUM TOXIN INJECTION;  Surgeon: Shireen Mayo MD;  Location: [facility] OR 1ST FLR;  Service: Urology;  Laterality: N/A;  300 U    CYSTOSCOPY,WITH BOTULINUM TOXIN INJECTION N/A 3/28/2023    Procedure: CYSTOSCOPY,WITH BOTULINUM TOXIN INJECTION;  Surgeon: Shireen Mayo MD;  Location: [facility] OR 1ST FLR;  Service: Urology;  Laterality: N/A;  45 MIN.    300 UNITS    ESOPHAGOGASTRODUODENOSCOPY N/A 5/23/2018    Procedure: ESOPHAGOGASTRODUODENOSCOPY (EGD);  Surgeon: Prince Vance MD;  Location: [facility] ENDO (4TH FLR);  Service: Endoscopy;  Laterality: N/A;  r/s 'd per Dr. Vance due to family emergency- ER    ESOPHAGOGASTRODUODENOSCOPY N/A 6/23/2023    Procedure: EGD (ESOPHAGOGASTRODUODENOSCOPY);  Surgeon: Juaquin Barry MD;  Location: [facility] ENDO (2ND FLR);  Service: Endoscopy;  Laterality: N/A;  Refferal: PRINCE VANCE  Order  tele enc 5/18/23  dx: history of a Nissen fundoplication  Additional Scheduling Information:  On home oxygen 2nd floor for airway protection also with a pain pump elevated BMI close to 40   Prep Gastroparesis   ins    HYSTERECTOMY  1975    endometriosis    INJECTION OF BOTULINUM TOXIN TYPE A  7/13/2021    Procedure: INJECTION, BOTULINUM TOXIN, 200units;  Surgeon: Shireen Mayo MD;  [facility];  Service: Urology;;    INJECTION OF BOTULINUM TOXIN TYPE A  11/16/2021    Procedure: INJECTION, BOTULINUM TOXIN, 200units;  Surgeon: Shireen Mayo MD;  [facility];  Service: Urology;;    INJECTION OF BOTULINUM TOXIN TYPE A  7/19/2022    Procedure: INJECTION, BOTULINUM TOXIN, 300 units ;  Surgeon: Shireen Mayo MD;  [facility];  Service: Urology;;    INJECTION OF BOTULINUM TOXIN TYPE A N/A 11/14/2023    Procedure: INJECTION, BOTULINUM TOXIN, TYPE A;  Surgeon: Shireen Mayo MD;  [facility];  Service: Urology;  Laterality: N/A;    INSERTION, SUPRAPUBIC CATHETER N/A 12/13/2022    Procedure: INSERTION,  SUPRAPUBIC CATHETER;  Surgeon: Shireen Mayo MD;  Location: [facility] OR 1ST FLR;  Service: Urology;  Laterality: N/A;  exchange    INSERTION, SUPRAPUBIC CATHETER N/A 11/14/2023    Procedure: INSERTION, SUPRAPUBIC CATHETER;  Surgeon: Shireen Mayo MD;  Location: [facility] OR 1ST FLR;  Service: Urology;  Laterality: N/A;  EXCHANGE of s/p tube    pain pump placement      SQ Dilaudid Pump managed by Dr. Hillman, Pain Management    REMOVAL OF BONE SPUR OF FOOT Bilateral 9/16/2022    Procedure: EXCISION ARTHRITIC BONE, BILATERAL FOOT;  Surgeon: Adam Mcguire DPM;  Location: [facility] OR;  Service: Podiatry;  Laterality: Bilateral;    REPLACEMENT OF BACLOFEN PUMP N/A 8/2/2023    Procedure: REPLACEMENT, BACLOFEN PUMP;  Surgeon: Smitha Condon MD;  Location: [facility] OR 2ND FLR;  Service: Neurosurgery;  Laterality: N/A;  Anes: Gen  Blood: Type & Screen  Pos: Left Lat  Intrathecal Pump  Bed: Reg Reversed  Rad: C-arm  Pump: 40 cc, [employer]    REPLACEMENT OF CATHETER N/A 10/31/2019    Procedure: REPLACEMENT, CATHETER-SUPRAPUBIC;  Surgeon: Shireen Mayo MD;  Location: [facility] OR 1ST FLR;  Service: Urology;  Laterality: N/A;    SPINAL CORD STIMULATOR REMOVAL      before [place]    SPINE SURGERY  5-13-13    CERVICAL FUSION    TONSILLECTOMY          Objective:     Vitals  Wt Readings from Last 1 Encounters:   05/10/24 101.2 kg (223 lb 1.7 oz)     Temp Readings from Last 1 Encounters:   05/10/24 97.8 °F (36.6 °C)     BP Readings from Last 1 Encounters:   05/10/24 100/64     Pulse Readings from Last 1 Encounters:   05/10/24 75       Dermatological Exam    Skin:  Pedal hair growth diminished and Pedal skin thin and shiny on right; irritation and blistering of lower leg skin  Pedal hair growth diminished and Pedal skin thin and shiny on left; irritation and blistering of lower leg skin       Nails:  10 nail(s) thickened and 10 nail(s) discolored    Vascular Exam    Arteries:  Posterior tibial artery nonpalpable on right  Dorsalis  pedis artery palpable on right  Posterior tibial artery nonpalpable on left  Dorsalis pedis artery palpable on left    Veins:  Telangectasia on right  Telangectasia on left    Swellin+ nonpitting on right  2+ nonpitting on left    Neurological Exam    [place] touch test:  6/6 sites sensed, light touch intact     Musculoskeletal Exam    Footwear:  Surgical shoe on right  Surgical shoe on left    Gait Exam:   Ambulatory Status: Ambulatory  Gait: Normal  Assistive Devices: None    Foot Progression Angle:  Normal on right  Normal on left     Right Lower Extremity Additional Findings:  Diffuse severe pain from toes to knee  Right foot and ankle function, strength, and range of motion unremarkable except as noted above.     Left Lower Extremity Additional Findings:  Diffuse severe pain from toes to knee  Left foot and ankle function, strength, and range of motion unremarkable except as noted above.    Imaging and Other Tests    Imagin23 B foot and ankle X rays: bilateral flatfoot deformity, osteopenia, right 1st toe and 2nd-5th distal metatarsal fractures    Independently reviewed and interpreted imaging, findings are as follows: N/A     Assessment:     No diagnosis found.             Plan:     I counseled the patient on her conditions, their implications and medical management.    Intractable pain, both lower legs and feet: chronic stable  -Severe intractable pain of both lower extremities, difficult to control despite multiple medications, has resulted in multiple ED visits, contributes to falling on a regular basis; high risk for hospitalization.   -Continue duloxetine 120 mg/day.   -Continue Norco and tramadol PRN, wean off as tolerated.     -Previously did not tolerate pregabalin or amitriptyline, may consider gabapentin.     Bilateral foot fractures: subacute    -Appreciate that patient cannot safely remain NWB.  -Continue protected WB BLE in surgical shoe for now.      Lymphedema, blistering,  cellulitis, rash: chronic stable   -Topical triamcinolone.   -Bilateral unna boot multilayer compression dressings applied to lower legs.    -Reordered HH dressing changes.        Return to clinic in 1 month for skin check and dressing change, call sooner PRN.

## 2024-05-10 NOTE — TELEPHONE ENCOUNTER
----- Message from Tiago Rojas sent at 5/9/2024  2:41 PM CDT -----  Regarding: Patient Concerned but not totally clear  Contact: Tasha [phone]  [email or web address]     Patient is calling for Medical Advice regarding: Patient has concerns about something but I could not understand a word. Please call back and see if you can understand... I think she said she needed results to lab work, but I am not sure.    How long has patient had these symptoms:    Pharmacy name and phone#:    Would like response via [employer]:  ##    Comments:

## 2024-05-10 NOTE — TELEPHONE ENCOUNTER
----- Message from Charla Clark sent at 5/10/2024  1:04 PM CDT -----  Contact: [phone]  1MEDICALADVICE     Patient is calling for Medical Advice regarding: Questions    How long has patient had these symptoms:    Pharmacy name and phone#:    Would like response via [email or web address]:  call back     Comments:

## 2024-05-10 NOTE — TELEPHONE ENCOUNTER
----- Message from Karla Barboza sent at 5/10/2024  1:42 PM CDT -----  Contact: [phone]  Patient called, would like a call back from Dr Hodges's ma in regards patient TRANSTHORACIC ECHO COMPLETE. Please call and advise. Thank you.

## 2024-05-13 ENCOUNTER — TELEPHONE (OUTPATIENT)
Dept: INTERNAL MEDICINE | Facility: CLINIC | Age: 69
End: 2024-05-13
Payer: MEDICARE

## 2024-05-13 PROBLEM — N39.0 UTI (URINARY TRACT INFECTION): Status: RESOLVED | Noted: 2021-06-03 | Resolved: 2024-05-13

## 2024-05-13 PROBLEM — J96.11 CHRONIC HYPOXIC RESPIRATORY FAILURE: Status: RESOLVED | Noted: 2024-02-10 | Resolved: 2024-05-13

## 2024-05-13 LAB
BACTERIA BLD CULT: NORMAL
BACTERIA BLD CULT: NORMAL

## 2024-05-13 NOTE — TELEPHONE ENCOUNTER
Call pt pt stated that's no one from home health has call are come by to help her pt stated that she really wont to talk to Dr Hodges.

## 2024-05-13 NOTE — TELEPHONE ENCOUNTER
Spoke to patient today. Will try to expedite home health and care at home. Went over all results and will be scheduling repeat labs.

## 2024-05-13 NOTE — TELEPHONE ENCOUNTER
Reached out to [facility] HH intake dept to ask the status of this request.   Spoke to Deni BHAT and she explained pt was dc'd from service some time ago for non-compliance and not allowed to re-admit for that reason.  Family will have to select another HH covered by [facility].

## 2024-05-14 ENCOUNTER — CARE AT HOME (OUTPATIENT)
Dept: HOME HEALTH SERVICES | Facility: CLINIC | Age: 69
End: 2024-05-14
Payer: MEDICARE

## 2024-05-14 VITALS — HEART RATE: 82 BPM | RESPIRATION RATE: 16 BRPM

## 2024-05-14 DIAGNOSIS — B37.2 YEAST DERMATITIS: ICD-10-CM

## 2024-05-14 DIAGNOSIS — Z93.59 SUPRAPUBIC CATHETER: Primary | Chronic | ICD-10-CM

## 2024-05-14 DIAGNOSIS — I89.0 LYMPHEDEMA OF BOTH LOWER EXTREMITIES: ICD-10-CM

## 2024-05-14 PROCEDURE — 3044F HG A1C LEVEL LT 7.0%: CPT | Mod: CPTII,S$GLB,, | Performed by: NURSE PRACTITIONER

## 2024-05-14 PROCEDURE — 1126F AMNT PAIN NOTED NONE PRSNT: CPT | Mod: CPTII,S$GLB,, | Performed by: NURSE PRACTITIONER

## 2024-05-14 PROCEDURE — 1159F MED LIST DOCD IN RCRD: CPT | Mod: CPTII,S$GLB,, | Performed by: NURSE PRACTITIONER

## 2024-05-14 PROCEDURE — 51705 CHANGE OF BLADDER TUBE: CPT | Mod: S$GLB,,, | Performed by: NURSE PRACTITIONER

## 2024-05-14 PROCEDURE — 99349 HOME/RES VST EST MOD MDM 40: CPT | Mod: 25,S$GLB,, | Performed by: NURSE PRACTITIONER

## 2024-05-14 PROCEDURE — 1160F RVW MEDS BY RX/DR IN RCRD: CPT | Mod: CPTII,S$GLB,, | Performed by: NURSE PRACTITIONER

## 2024-05-14 RX ORDER — FLUCONAZOLE 200 MG/1
200 TABLET ORAL DAILY
Qty: 7 TABLET | Refills: 0 | Status: ON HOLD | OUTPATIENT
Start: 2024-05-14 | End: 2024-05-22

## 2024-05-14 RX ORDER — NYSTATIN 100000 [USP'U]/G
POWDER TOPICAL 2 TIMES DAILY
Qty: 60 G | Refills: 1 | Status: ON HOLD | OUTPATIENT
Start: 2024-05-14 | End: 2024-05-22

## 2024-05-14 NOTE — ASSESSMENT & PLAN NOTE
Chronic, history of significant lymphedema  No skin breakdown at this time  PCP is arranging home health to assist with wound care.  Pt requires supplies to wrap legs.  [facility] reports pt's insurance will not cover visits and supplies for leg wraps at this time as pt does not have skin breakdown. Reviewed with pt and daughter.

## 2024-05-14 NOTE — PATIENT INSTRUCTIONS
- Continue all medications, treatments and therapies as ordered.   - Follow all instructions, recommendations as discussed.  - Maintain Safety Precautions at all times.  - Attend all medical appointments as scheduled.  - For worsening symptoms: call Primary Care Physician or Nurse Practitioner.  - For emergencies, call 911 or immediately report to the nearest emergency room.

## 2024-05-14 NOTE — ASSESSMENT & PLAN NOTE
Candida rash to abd folds near SPT site.  Clumps noted in  bag.  PT reports PCP sent diflucan for her but she does not know which pharmacy for her  to collect. Requests send to [facility] pharmacy.  Keep clean and dry, apply Nystop to area BID

## 2024-05-14 NOTE — PROGRESS NOTES
[facility]  Medical Chronic Care Home Visit    Encounter Provider: Gracie Vega   PCP: Mesfin Hodges II, MD  Consult Requested By: Dr. Merly Garcia    HISTORY OF PRESENT ILLNESS      Patient ID: Tasha Hawley is a 68 y.o. female is being seen in the home due to physical debility that presents a taxing effort to leave the home, to mitigate high risk of hospital readmission and/or due to the limited availability of reliable or safe options for transportation to the point of access to the provider. Prior to treatment on this visit the chart was reviewed and patient verbal consent was obtained.    Chronic medical conditions synopsis:    Pt is being seen in her home today after referral by her PCP.  She has recently been discharged from SNF. Her daughter, Lisa is on the phone for this visit, and her , Dangelo is present. Lisa reports she was doing well at rehab and was discharged early. She was participating in therapy and moving around well. She was discharged from [facility] without home health arranged per daughter.She was previously seen by [facility]. They declined to readmit pt as she is non-compliant, cancels appts, does not answer the door and is not compliant with treatment options. PCP is attempting other agencies. Daughter reports she is falling back into her old habits since dc from rehab. Not moving around the home, not taking medications correctly. There is some tension in the home, and spouse is having some caregiver burnout.  Pt reports she has a yeast infection in her urine, and diflucan sent to pharmacy, but she does not know where and has not collected. Concerned her urine maybe infected and that SPT is overdue for exchange.    DECISION MAKING TODAY       Assessment & Plan:  1. Suprapubic catheter  Assessment & Plan:  Pt and daughter reports SPT has not been changed in over 6 weeks.  Home health not in place to exchange.    SPT exchanged during visit using aseptic  technique.   22Fr, 30cc balloon meadows inserted without difficulty. Urine returned yellow with some clumps. Pt tolerated well. Draining to  bag    Keep upcoming urology appt    Orders:  -     Ambulatory referral/consult to [facility]    2. Lymphedema of both lower extremities  Assessment & Plan:  Chronic, history of significant lymphedema  No skin breakdown at this time  PCP is arranging home health to assist with wound care.  Pt requires supplies to wrap legs.  [facility] reports pt's insurance will not cover visits and supplies for leg wraps at this time as pt does not have skin breakdown. Reviewed with pt and daughter.    Orders:  -     Ambulatory referral/consult to [facility]    3. Yeast dermatitis  Assessment & Plan:  Candida rash to abd folds near SPT site.  Clumps noted in  bag.  PT reports PCP sent diflucan for her but she does not know which pharmacy for her  to collect. [facility].  Keep clean and dry, apply Nystop to area BID      Other orders  -     fluconazole (DIFLUCAN) 200 MG Tab; Take 1 tablet (200 mg total) by mouth once daily. for 7 days  Dispense: 7 tablet; Refill: 0  -     nystatin (MYCOSTATIN) powder; Apply topically 2 (two) times daily.  Dispense: 60 g; Refill: 1           ENVIRONMENT OF CARE      Family and/or Caregiver present at visit?  Yes  Name of Caregiver: Dangelo-, Lisa daughter on phone  History provided by: patient    4Ms for Medical Decision-Making in Older Adults    Last Completed EAWV: None    MOBILITY:  Get Up and Go:       No data to display              Activities of Daily Living:      10/24/2018    10:23 AM   Activities of Daily Living   Ambulation Assistance Required   Ambulation Assistance Walker (wheeled)   Dressing Assistance Required   Dressing Assistance Moderate   Number of people 1   Transfers Assistance Required   Transfers Assistance Moderate   Number of people 1   Toileting Continent  of bladder   Feeding Independent   Cleaning home/Chores Assistance Required   Telephone use Independent   Shopping Assistance Required   Paying bills Assistance Required   Taking meds Independent     Whisper Test:      10/24/2018    10:19 AM   Whisper Test   Whisper Test N/A- Hearing Impairment     Disability Status:      10/24/2018    10:25 AM   Disability Status   Are you deaf or do you have serious difficulty hearing? Y   Are you blind or do you have serious difficulty seeing, even when wearing glasses? N   Because of a physical, mental, or emotional condition, do you have serious difficulty concentrating, remembering, or making decisions? Y   Do you have serious difficulty walking or climbing stairs? Y   Do you have difficulty dressing or bathing? Y   Because of a physical, mental, or emotional condition, do you have difficulty doing errands alone such as visiting a doctor's office or shopping? Y     Nutrition Screening:      10/24/2018    10:22 AM   Nutrition Screening   Has food intake declined over the past three months due to loss of appetite, digestive problems, chewing or swallowing difficulties? Moderate decrease in food intake   Involuntary weight loss during the last 3 months? Weight loss greater than 3 kg (6.6 pounds)   Mobility? Able to get out of bed/chair, but does not go out   Has the patient suffered psychological stress or acute disease in the past three months? Yes   Neuropsychological problems? No psychological problems   Body Mass Index (BMI)?  BMI 23 or greater   Screening Score 7    Screening Score: 0-7 Malnourished, 8-11 At Risk, 12-14 Normal    MENTATION:   Depression Patient Health Questionnaire:      3/19/2024     9:28 AM   Depression Patient Health Questionnaire   Over the last two weeks how often have you been bothered by little interest or pleasure in doing things Not at all   Over the last two weeks how often have you been bothered by feeling down, depressed or hopeless Not at all    PHQ-2 Total Score 0     Has Dementia Dx: No    Cognitive Function Screening:      10/24/2018    10:20 AM   Cognitive Function Screening   Mini-Cog 3 Minute Recall 3   Cognitive Function Screening 6     Cognitive Function Screening Total - Less than 4 = Abnormal,  Greater than or equal to 4 = Normal    MEDICATIONS:  High Risk Medications:  Total Active Medications: 5  amitriptyline - 25 MG  DULoxetine - 60 MG  HYDROcodone-acetaminophen -  mg  QUEtiapine Tab - 100 MG  traMADoL - 50 mg    WHAT MATTERS MOST:  Advance Care Planning   ACP Status:   Patient has had an ACP conversation  Living Will: Yes  Power of : No  LaPOST: No    What is most important right now is to focus on curative/life-prolongation (regardless of treatment burdens)    Accordingly, we have decided that the best plan to meet the patient's goals includes continuing with treatment      What matters most to patient today is: SPT change     Is patient hospice appropriate: No    Impression upon entering the home:  Physical Dwelling: single family home   Appearance of home environment: cleaniness: clean  Functional Status: moderate assistance  Mobility: ambulatory with device  Nutritional access: adequate intake and access  Home Health: No, and will be referred today   DME/Supplies: rolling walker and oxygen       Discussion with other health care providers: No discussion with other health care providers necessary.   Does patient have a PCP at [facility]? Yes   Repatriation plan with PCP? follow-up with PCP within 90d   Does patient have an ostomy (ileostomy, colostomy, suprapubic catheter, nephrostomy tube, tracheostomy, PEG tube, pleurex catheter, cholecystostomy, etc)? Yes. Is it on problem list?yes  Were BPAs reviewed? Yes    Social History     Socioeconomic History    Marital status:    Tobacco Use    Smoking status: Never    Smokeless tobacco: Never   Substance and Sexual Activity    Alcohol use: Never    Drug use: Yes     Types:  "Marijuana     Comment: "medical marijuana gummies"     Social Determinants of Health     Financial Resource Strain: Low Risk  (3/19/2024)    Overall Financial Resource Strain (CARDIA)     Difficulty of Paying Living Expenses: Not very hard   Food Insecurity: No Food Insecurity (3/19/2024)    Hunger Vital Sign     Worried About Running Out of Food in the Last Year: Never true     Ran Out of Food in the Last Year: Never true   Transportation Needs: No Transportation Needs (3/19/2024)    PRAPARE - Transportation     Lack of Transportation (Medical): No     Lack of Transportation (Non-Medical): No   Physical Activity: Inactive (3/19/2024)    Exercise Vital Sign     Days of Exercise per Week: 0 days     Minutes of Exercise per Session: 0 min   Stress: Stress Concern Present (3/19/2024)    [employer] [place] [employer] - Occupational Stress Questionnaire     Feeling of Stress : To some extent   Housing Stability: Low Risk  (3/19/2024)    Housing Stability Vital Sign     Unable to Pay for Housing in the Last Year: No     Number of Places Lived in the Last Year: 1     Unstable Housing in the Last Year: No         OBJECTIVE:     Vital Signs:  Vitals:    05/14/24 1200   Pulse: 82   Resp: 16       Review of Systems   Constitutional:  Negative for activity change, fatigue and fever.   HENT: Negative.     Eyes: Negative.    Respiratory:  Negative for chest tightness.    Cardiovascular:  Positive for leg swelling.   Gastrointestinal: Negative.    Genitourinary:         Mucus in  tubing   Musculoskeletal:  Positive for gait problem.   Skin:  Positive for rash (to perineal area).   Hematological: Negative.    Psychiatric/Behavioral: Negative.  Negative for agitation.    All other systems reviewed and are negative.      Physical Exam:  Physical Exam  Vitals reviewed.   Constitutional:       General: She is not in acute distress.     Appearance: She is well-developed. She is obese.   HENT:      Head: Normocephalic and " atraumatic.      Nose: Nose normal.      Mouth/Throat:      Mouth: Mucous membranes are dry.   Eyes:      Pupils: Pupils are equal, round, and reactive to light.   Cardiovascular:      Rate and Rhythm: Normal rate and regular rhythm.      Heart sounds: Normal heart sounds.   Pulmonary:      Effort: Pulmonary effort is normal.      Breath sounds: Normal breath sounds.   Abdominal:      General: Bowel sounds are normal.      Palpations: Abdomen is soft.   Genitourinary:     Comments: SPT tube in place, stoma intact, candida rash to skin in pannus skin folds  Musculoskeletal:         General: Normal range of motion.      Cervical back: Normal range of motion and neck supple.      Comments: Poor posture   Skin:     General: Skin is warm and dry.   Neurological:      Mental Status: She is alert and oriented to person, place, and time.      Cranial Nerves: No cranial nerve deficit.   Psychiatric:         Behavior: Behavior normal.         Thought Content: Thought content normal.         Judgment: Judgment normal.         INSTRUCTIONS FOR PATIENT:     Scheduled Follow-up, Appts Reviewed with Modifications if Needed: Yes  Future Appointments   Date Time Provider Department Center   5/16/2024 11:00 AM Aspen Durán NP [facility]   5/30/2024 11:20 AM Mesfin Hodges II, MD [facility] IM [address] [facility]   6/12/2024 11:45 AM Nic Carranza DPM [facility] PODIA [place]   6/20/2024  9:00 AM Nick Chilel MD [facility] [address] [address]   7/1/2024 11:00 AM Efe Hickey MD [facility] ENDODIA [address]         Current Outpatient Medications:     amitriptyline (ELAVIL) 25 MG tablet, Take 1 tablet (25 mg total) by mouth every evening., Disp: 90 tablet, Rfl: 0    aspirin (ECOTRIN) 81 MG EC tablet, Take 1 tablet (81 mg total) by mouth once daily., Disp: 90 tablet, Rfl: 3    atorvastatin (LIPITOR) 80 MG tablet, Take 1 tablet (80 mg total) by mouth once daily., Disp: 90 tablet, Rfl: 0    butalbital-acetaminophen-caffeine -40 mg  (FIORICET, ESGIC) -40 mg per tablet, Take 1 tablet by mouth daily as needed., Disp: , Rfl:     calcium-vitamin D3 (OS-JACKIE 500 + D3) 500 mg-5 mcg (200 unit) per tablet, Take 2 tablets by mouth 2 (two) times daily., Disp: 120 tablet, Rfl: 11    cyanocobalamin, vitamin B-12, 5,000 mcg Subl, Place 1 tablet under the tongue once daily., Disp: , Rfl:     darifenacin (ENABLEX) 7.5 MG Tb24, Take 1 tablet (7.5 mg total) by mouth once daily., Disp: 30 tablet, Rfl: 0    docusate sodium (COLACE) 100 MG capsule, Take 200 mg by mouth every evening., Disp: , Rfl:     DULoxetine (CYMBALTA) 60 MG capsule, Take 1 capsule (60 mg total) by mouth 2 (two) times daily., Disp: 180 capsule, Rfl: 0    fluconazole (DIFLUCAN) 200 MG Tab, Take 1 tablet (200 mg total) by mouth once daily. for 7 days, Disp: 7 tablet, Rfl: 0    fludrocortisone (FLORINEF) 0.1 mg Tab, Take a half-tablet (50 mcg) by mouth once daily, Disp: 15 tablet, Rfl: 5    furosemide (LASIX) 40 MG tablet, Take 1 tablet (40 mg total) by mouth once daily., Disp: 90 tablet, Rfl: 0    HYDROcodone-acetaminophen (NORCO)  mg per tablet, Take 1 tablet by mouth every 6 (six) hours as needed for Pain., Disp: 30 tablet, Rfl: 0    hydrocortisone (CORTEF) 10 MG Tab, Take 1 tablet (10 mg total) by mouth every evening., Disp: 90 tablet, Rfl: 0    hydrOXYzine (ATARAX) 50 MG tablet, Take 1 tablet (50 mg total) by mouth every 4 (four) hours as needed for Anxiety., Disp: 30 tablet, Rfl: 0    intrathecal pain pump compound, Hydromorphone (7.5 mg/mL) infusion at 8.59 mg/day (0.3578 mg/hr) out of a total reservoir volume of 40 mL Pump filled monthly, Disp: , Rfl:     levothyroxine (SYNTHROID) 150 MCG tablet, Take 1 tablet (150 mcg total) by mouth before breakfast., Disp: 90 tablet, Rfl: 0    LIDOcaine (LIDODERM) 5 %, Place 1 patch onto the skin once daily. Remove & Discard patch within 12 hours or as directed by MD, Disp: , Rfl: 0    linaCLOtide (LINZESS) 72 mcg Cap capsule, Take 1 capsule  (72 mcg total) by mouth before breakfast., Disp: 30 capsule, Rfl: 11    nitroGLYCERIN (NITROSTAT) 0.4 MG SL tablet, Place 0.4 mg under the tongue every 5 (five) minutes as needed for Chest pain., Disp: , Rfl:     nystatin (MYCOSTATIN) powder, Apply topically 2 (two) times daily., Disp: 60 g, Rfl: 1    ondansetron (ZOFRAN-ODT) 4 MG TbDL, Take 4 mg by mouth every 4 to 6 hours as needed., Disp: , Rfl:     pantoprazole (PROTONIX) 40 MG tablet, Take 1 tablet (40 mg total) by mouth once daily., Disp: 90 tablet, Rfl: 0    potassium chloride (MICRO-K) 10 MEQ CpSR, Take 2 capsules (20 mEq total) by mouth once daily., Disp: 180 capsule, Rfl: 0    promethazine (PHENERGAN) 25 MG tablet, Take 1 tablet (25 mg total) by mouth every 6 (six) hours as needed for Nausea., Disp: 20 tablet, Rfl: 0    QUEtiapine (SEROQUEL) 100 MG Tab, TAKE 1 TABLET (100 MG) BY MOUTH NIGHTLY, Disp: 180 tablet, Rfl: 0    senna (SENNA LAX) 8.6 mg tablet, Take 2 tablets by mouth 2 (two) times daily., Disp: , Rfl:     tiotropium bromide (SPIRIVA RESPIMAT) 2.5 mcg/actuation inhaler, Inhale 2 puffs into the lungs once daily. Controller, Disp: 4 g, Rfl: 1    traMADoL (ULTRAM) 50 mg tablet, Take 1 tablet (50 mg total) by mouth every 4 (four) hours as needed for Pain., Disp: 30 tablet, Rfl: 0    traZODone (DESYREL) 300 MG tablet, Take 0.5 tablets (150 mg total) by mouth every evening., Disp: 90 tablet, Rfl: 0    triamcinolone acetonide 0.1% (KENALOG) 0.1 % cream, Apply topically 2 (two) times daily., Disp: 45 g, Rfl: 0    Medication Reconciliation:  Were medications changed during this appointment? No  Were medications in the home? Yes  Is the patient taking the medications as directed? Yes  Does the patient/caregiver understand the medications and changes? Yes  Does updated med list accurately reflects meds patient is currently taking? Yes    This includes 45 min face to face time and non-face to face time preparing to see the patient (eg, review of tests),  obtaining and/or reviewing separately obtained history, documenting clinical information in the electronic or other health record, independently interpreting results and communicating results to the patient/family/caregiver, or care coordinator.        Signature: Gracie Vega NP

## 2024-05-14 NOTE — ASSESSMENT & PLAN NOTE
Pt and daughter reports SPT has not been changed in over 6 weeks.  Home health not in place to exchange.    SPT exchanged during visit using aseptic technique.   22Fr, 30cc balloon meadows inserted without difficulty. Urine returned yellow with some clumps. Pt tolerated well. Draining to  bag    Keep upcoming urology appt

## 2024-05-15 ENCOUNTER — NURSE TRIAGE (OUTPATIENT)
Dept: ADMINISTRATIVE | Facility: CLINIC | Age: 69
End: 2024-05-15
Payer: MEDICARE

## 2024-05-15 NOTE — TELEPHONE ENCOUNTER
Spoke to patient she states she imodium is helping now and she doesn't need to come in. I'll figure out what HH agency she can go to.

## 2024-05-15 NOTE — TELEPHONE ENCOUNTER
Spoke to patient she states she has been having very watery stools for the last 2 days. She states she slept on the toilet last night. Patient has taken 4 imodium without any help at all. C/O bottom being very sore from the diarrhea. Patient would like to know what else you think she can do to stop it. Please advise     I did mention an appointment may be needed.

## 2024-05-15 NOTE — TELEPHONE ENCOUNTER
Pt stated she is calling for Gracie. She stated she has been having diarrhea. She took 4 immodiums and it has not helped it yet. Pt has had more than 6 bowel movements. Buttocks johnson like fire. Pt stated she also had blood in her stool. Care advice recommend pt go to the Er. Pt refused. Please call and advise pt.  Reason for Disposition   Bloody, black, or tarry bowel movements  (Exception: Chronic-unchanged black-grey bowel movements and is taking iron pills or Pepto-Bismol.)    Additional Information   Negative: Shock suspected (e.g., cold/pale/clammy skin, too weak to stand, low BP, rapid pulse)   Negative: Difficult to awaken or acting confused (e.g., disoriented, slurred speech)   Negative: Sounds like a life-threatening emergency to the triager   Negative: SEVERE abdominal pain (e.g., excruciating) and present > 1 hour   Negative: SEVERE abdominal pain and age > 60 years    Protocols used: Diarrhea-A-[place]

## 2024-05-15 NOTE — TELEPHONE ENCOUNTER
Please call her back.  She needs to go to the ER or come for a visit if imodium isn't working.    On another note, she needs home health, but she has been fired from home health ([facility], I think).  Can you investigate and find out if there is an agency that will take her?

## 2024-05-16 ENCOUNTER — TELEPHONE (OUTPATIENT)
Dept: INTERNAL MEDICINE | Facility: CLINIC | Age: 69
End: 2024-05-16
Payer: MEDICARE

## 2024-05-16 ENCOUNTER — HOSPITAL ENCOUNTER (EMERGENCY)
Facility: HOSPITAL | Age: 69
Discharge: HOME | End: 2024-05-17
Attending: STUDENT IN AN ORGANIZED HEALTH CARE EDUCATION/TRAINING PROGRAM
Payer: MEDICARE

## 2024-05-16 ENCOUNTER — DOCUMENT SCAN (OUTPATIENT)
Dept: HOME HEALTH SERVICES | Facility: HOSPITAL | Age: 69
End: 2024-05-16
Payer: MEDICARE

## 2024-05-16 VITALS
OXYGEN SATURATION: 100 % | RESPIRATION RATE: 20 BRPM | TEMPERATURE: 98 F | DIASTOLIC BLOOD PRESSURE: 63 MMHG | HEIGHT: 65 IN | WEIGHT: 214 LBS | SYSTOLIC BLOOD PRESSURE: 134 MMHG | BODY MASS INDEX: 35.65 KG/M2 | HEART RATE: 83 BPM

## 2024-05-16 DIAGNOSIS — I50.9 CHF (CONGESTIVE HEART FAILURE): ICD-10-CM

## 2024-05-16 DIAGNOSIS — M79.89 LEG SWELLING: ICD-10-CM

## 2024-05-16 DIAGNOSIS — I83.009 VENOUS ULCER: Primary | ICD-10-CM

## 2024-05-16 DIAGNOSIS — L97.909 VENOUS ULCER: Primary | ICD-10-CM

## 2024-05-16 LAB
ALBUMIN SERPL BCP-MCNC: 3.6 G/DL (ref 3.5–5.2)
ALP SERPL-CCNC: 157 U/L (ref 55–135)
ALT SERPL W/O P-5'-P-CCNC: 10 U/L (ref 10–44)
ANION GAP SERPL CALC-SCNC: 13 MMOL/L (ref 8–16)
AST SERPL-CCNC: 20 U/L (ref 10–40)
BASOPHILS # BLD AUTO: 0.01 K/UL (ref 0–0.2)
BASOPHILS NFR BLD: 0.2 % (ref 0–1.9)
BILIRUB SERPL-MCNC: 0.5 MG/DL (ref 0.1–1)
BNP SERPL-MCNC: <10 PG/ML (ref 0–99)
BUN SERPL-MCNC: 9 MG/DL (ref 8–23)
CALCIUM SERPL-MCNC: 9.3 MG/DL (ref 8.7–10.5)
CHLORIDE SERPL-SCNC: 103 MMOL/L (ref 95–110)
CO2 SERPL-SCNC: 20 MMOL/L (ref 23–29)
CREAT SERPL-MCNC: 0.9 MG/DL (ref 0.5–1.4)
DIFFERENTIAL METHOD BLD: ABNORMAL
EOSINOPHIL # BLD AUTO: 0.4 K/UL (ref 0–0.5)
EOSINOPHIL NFR BLD: 6.2 % (ref 0–8)
ERYTHROCYTE [DISTWIDTH] IN BLOOD BY AUTOMATED COUNT: 13.5 % (ref 11.5–14.5)
EST. GFR  (NO RACE VARIABLE): >60 ML/MIN/1.73 M^2
GLUCOSE SERPL-MCNC: 104 MG/DL (ref 70–110)
HCT VFR BLD AUTO: 35.7 % (ref 37–48.5)
HGB BLD-MCNC: 11.5 G/DL (ref 12–16)
IMM GRANULOCYTES # BLD AUTO: 0.02 K/UL (ref 0–0.04)
IMM GRANULOCYTES NFR BLD AUTO: 0.3 % (ref 0–0.5)
LYMPHOCYTES # BLD AUTO: 1.7 K/UL (ref 1–4.8)
LYMPHOCYTES NFR BLD: 28.7 % (ref 18–48)
MCH RBC QN AUTO: 28.3 PG (ref 27–31)
MCHC RBC AUTO-ENTMCNC: 32.2 G/DL (ref 32–36)
MCV RBC AUTO: 88 FL (ref 82–98)
MONOCYTES # BLD AUTO: 0.8 K/UL (ref 0.3–1)
MONOCYTES NFR BLD: 12.9 % (ref 4–15)
NEUTROPHILS # BLD AUTO: 3.1 K/UL (ref 1.8–7.7)
NEUTROPHILS NFR BLD: 51.7 % (ref 38–73)
NRBC BLD-RTO: 0 /100 WBC
PLATELET # BLD AUTO: 231 K/UL (ref 150–450)
PLATELET BLD QL SMEAR: ABNORMAL
PMV BLD AUTO: 10.7 FL (ref 9.2–12.9)
POTASSIUM SERPL-SCNC: 4.7 MMOL/L (ref 3.5–5.1)
PROT SERPL-MCNC: 8 G/DL (ref 6–8.4)
RBC # BLD AUTO: 4.06 M/UL (ref 4–5.4)
SODIUM SERPL-SCNC: 136 MMOL/L (ref 136–145)
TROPONIN I SERPL DL<=0.01 NG/ML-MCNC: <0.006 NG/ML (ref 0–0.03)
WBC # BLD AUTO: 5.96 K/UL (ref 3.9–12.7)

## 2024-05-16 PROCEDURE — 99285 EMERGENCY DEPT VISIT HI MDM: CPT | Mod: 25,HCNC

## 2024-05-16 PROCEDURE — 63600175 PHARM REV CODE 636 W HCPCS: Mod: HCNC | Performed by: STUDENT IN AN ORGANIZED HEALTH CARE EDUCATION/TRAINING PROGRAM

## 2024-05-16 PROCEDURE — 96374 THER/PROPH/DIAG INJ IV PUSH: CPT | Mod: HCNC

## 2024-05-16 PROCEDURE — 93005 ELECTROCARDIOGRAM TRACING: CPT | Mod: HCNC

## 2024-05-16 PROCEDURE — 25000003 PHARM REV CODE 250: Mod: HCNC | Performed by: STUDENT IN AN ORGANIZED HEALTH CARE EDUCATION/TRAINING PROGRAM

## 2024-05-16 PROCEDURE — 93010 ELECTROCARDIOGRAM REPORT: CPT | Mod: HCNC,,, | Performed by: INTERNAL MEDICINE

## 2024-05-16 PROCEDURE — 85025 COMPLETE CBC W/AUTO DIFF WBC: CPT | Mod: HCNC | Performed by: STUDENT IN AN ORGANIZED HEALTH CARE EDUCATION/TRAINING PROGRAM

## 2024-05-16 PROCEDURE — 96375 TX/PRO/DX INJ NEW DRUG ADDON: CPT | Mod: HCNC

## 2024-05-16 PROCEDURE — 84484 ASSAY OF TROPONIN QUANT: CPT | Mod: HCNC | Performed by: STUDENT IN AN ORGANIZED HEALTH CARE EDUCATION/TRAINING PROGRAM

## 2024-05-16 PROCEDURE — 80053 COMPREHEN METABOLIC PANEL: CPT | Mod: HCNC | Performed by: STUDENT IN AN ORGANIZED HEALTH CARE EDUCATION/TRAINING PROGRAM

## 2024-05-16 PROCEDURE — 83880 ASSAY OF NATRIURETIC PEPTIDE: CPT | Mod: HCNC | Performed by: STUDENT IN AN ORGANIZED HEALTH CARE EDUCATION/TRAINING PROGRAM

## 2024-05-16 RX ORDER — FLUCONAZOLE 150 MG/1
150 TABLET ORAL
Status: COMPLETED | OUTPATIENT
Start: 2024-05-16 | End: 2024-05-16

## 2024-05-16 RX ORDER — FUROSEMIDE 10 MG/ML
40 INJECTION INTRAMUSCULAR; INTRAVENOUS
Status: COMPLETED | OUTPATIENT
Start: 2024-05-16 | End: 2024-05-16

## 2024-05-16 RX ORDER — HYDROXYZINE PAMOATE 25 MG/1
25 CAPSULE ORAL
Status: COMPLETED | OUTPATIENT
Start: 2024-05-16 | End: 2024-05-16

## 2024-05-16 RX ORDER — HYDROMORPHONE HYDROCHLORIDE 1 MG/ML
1 INJECTION, SOLUTION INTRAMUSCULAR; INTRAVENOUS; SUBCUTANEOUS
Status: COMPLETED | OUTPATIENT
Start: 2024-05-16 | End: 2024-05-16

## 2024-05-16 RX ADMIN — FUROSEMIDE 40 MG: 10 INJECTION, SOLUTION INTRAMUSCULAR; INTRAVENOUS at 10:05

## 2024-05-16 RX ADMIN — FLUCONAZOLE 150 MG: 150 TABLET ORAL at 10:05

## 2024-05-16 RX ADMIN — HYDROMORPHONE HYDROCHLORIDE 1 MG: 1 INJECTION, SOLUTION INTRAMUSCULAR; INTRAVENOUS; SUBCUTANEOUS at 10:05

## 2024-05-16 RX ADMIN — HYDROXYZINE PAMOATE 25 MG: 25 CAPSULE ORAL at 10:05

## 2024-05-16 NOTE — TELEPHONE ENCOUNTER
List of home health agencies given to daughter to call. Daughter Lisa states she will let me know who they end up going with. But in the mean time should Mrs. Hawley go to wound care for dressing changes for the unna boot. Patient states she needs supplies but doesn't know exactly what is need. Please advise

## 2024-05-16 NOTE — TELEPHONE ENCOUNTER
----- Message from Eris Astorga sent at 5/16/2024 10:52 AM CDT -----  Contact: Pt [phone]  Consult    She said she need leg wrapping supplies. And orders for home health    Thank you

## 2024-05-17 ENCOUNTER — TELEPHONE (OUTPATIENT)
Dept: WOUND CARE | Facility: HOSPITAL | Age: 69
End: 2024-05-17
Payer: MEDICARE

## 2024-05-17 NOTE — ED PROVIDER NOTES
Encounter Date: 5/16/2024       History     Chief Complaint   Patient presents with    Leg Swelling     C/O edema to bilateral lower legs. Also states she has blisters all over legs that started last night. Pt states she is being treated for a yeast infection.     HPI    68-year-old female extensive medical history including CHF, CAD, severe chronic lymphedema typically using the extra XL compression stockings  with home health, history of poly allergy, anxiety, claustrophobia and chronic pain,  presents for painful acute swelling of bilateral chronic leg swelling.  Complains that it is starting to blister in certain areas on the legs.  She is currently on antibiotics for UTI.  Review of patient's allergies indicates:   Allergen Reactions    (d)-limonene flavor      Other reaction(s): difficult intubation  Other reaction(s): Difficulty breathing    Benadryl [diphenhydramine hcl] Shortness Of Breath    Fentanyl Itching, Nausea And Vomiting and Swelling             Imitrex [sumatriptan succinate] Shortness Of Breath    Oxycodone-acetaminophen Shortness Of Breath    Sulfamethoxazole-trimethoprim Anaphylaxis    Topiramate Shortness Of Breath    Vancomycin Anaphylaxis     Rash    Butorphanol tartrate     Evening primrose (oenothera biennis)     Latex     Pregabalin Other (See Comments)     tremors    Propoxyphene n-acetaminophen      Other reaction(s): Difficulty breathing    Sumatriptan     White petrolatum-zinc     Zinc acetate     Zinc oxide-white petrolatum      Other reaction(s): Difficulty breathing    Latex, natural rubber Itching and Rash    Phenytoin Rash and Other (See Comments)     Trouble breathing     Past Medical History:   Diagnosis Date    Anxiety     Arthritis     Bilateral lower extremity edema     severe chronic    Carotid artery occlusion     Cataract     CHF (congestive heart failure)     Coronary artery disease     subtotalled LAD with collateral    Depression     Fever blister     Hard of hearing      Hypokalemia 01/09/2023    Hyponatremia 02/04/2022    Hypothyroid     Iron deficiency anemia     Lumbar radiculopathy     with chronic pain    Ocular migraine     Other emphysema 05/22/2023    Renal disorder     Sleep apnea     cpap     Past Surgical History:   Procedure Laterality Date    ADENOIDECTOMY      BACK SURGERY      x 12    CARDIAC CATHETERIZATION  2016    subtotalled LAD with right to left collaterals    CATARACT EXTRACTION W/  INTRAOCULAR LENS IMPLANT Left     Dr Coleman     CYSTOSCOPIC LITHOLAPAXY N/A 6/27/2019    Procedure: CYSTOLITHOLAPAXY;  Surgeon: Shireen Mayo MD;  Location: [facility] OR 2ND FLR;  Service: Urology;  Laterality: N/A;    CYSTOSCOPIC LITHOLAPAXY N/A 9/3/2019    Procedure: CYSTOLITHOLAPAXY;  Surgeon: Shireen Mayo MD;  Location: [facility] OR 2ND FLR;  Service: Urology;  Laterality: N/A;    CYSTOSCOPY N/A 7/13/2021    Procedure: CYSTOSCOPY;  Surgeon: Shireen Mayo MD;  Location: [facility] OR 1ST FLR;  Service: Urology;  Laterality: N/A;    CYSTOSCOPY  11/16/2021    Procedure: CYSTOSCOPY;  Surgeon: Shireen Mayo MD;  Location: [facility] OR 1ST FLR;  Service: Urology;;    CYSTOSCOPY  7/19/2022    Procedure: CYSTOSCOPY;  Surgeon: Shireen Mayo MD;  [facility];  Service: Urology;;    CYSTOSCOPY WITH INJECTION OF PERIURETHRAL BULKING AGENT  7/19/2022    Procedure: CYSTOSCOPY, WITH PERIURETHRAL BULKING AGENT INJECTION-MACROPLASTIQUE;  Surgeon: Shireen Mayo MD;  Location: [facility] OR 1ST FLR;  Service: Urology;;    CYSTOSCOPY WITH INJECTION OF PERIURETHRAL BULKING AGENT N/A 3/28/2023    Procedure: CYSTOSCOPY, WITH PERIURETHRAL BULKING AGENT INJECTION;  Surgeon: Shireen Mayo MD;  Location: [facility] OR 1ST FLR;  Service: Urology;  Laterality: N/A;  [place]    CYSTOSCOPY WITH INJECTION OF PERIURETHRAL BULKING AGENT N/A 11/14/2023    Procedure: CYSTOSCOPY, WITH PERIURETHRAL BULKING AGENT INJECTION;  Surgeon: Shireen Mayo MD;  Location: [facility] OR 1ST FLR;  Service: Urology;   Laterality: N/A;    CYSTOSCOPY,WITH BOTULINUM TOXIN INJECTION N/A 12/13/2022    Procedure: CYSTOSCOPY,WITH BOTULINUM TOXIN INJECTION;  Surgeon: Shireen Mayo MD;  Location: [facility] OR 1ST FLR;  Service: Urology;  Laterality: N/A;  300 U    CYSTOSCOPY,WITH BOTULINUM TOXIN INJECTION N/A 3/28/2023    Procedure: CYSTOSCOPY,WITH BOTULINUM TOXIN INJECTION;  Surgeon: Shireen Mayo MD;  Location: [facility] OR 1ST FLR;  Service: Urology;  Laterality: N/A;  45 MIN.    300 UNITS    ESOPHAGOGASTRODUODENOSCOPY N/A 5/23/2018    Procedure: ESOPHAGOGASTRODUODENOSCOPY (EGD);  Surgeon: Prince Vance MD;  Location: [facility] ENDO (4TH FLR);  Service: Endoscopy;  Laterality: N/A;  r/s 'd per Dr. Vance due to family emergency- ER    ESOPHAGOGASTRODUODENOSCOPY N/A 6/23/2023    Procedure: EGD (ESOPHAGOGASTRODUODENOSCOPY);  Surgeon: Juaquin Barry MD;  Location: [facility] ENDO (2ND FLR);  Service: Endoscopy;  Laterality: N/A;  Refferal: PRINCE VANCE  Order  tele enc 5/18/23  dx: history of a Nissen fundoplication  Additional Scheduling Information:  On home oxygen 2nd floor for airway protection also with a pain pump elevated BMI close to 40   Prep Gastroparesis   ins    HYSTERECTOMY  1975    endometriosis    INJECTION OF BOTULINUM TOXIN TYPE A  7/13/2021    Procedure: INJECTION, BOTULINUM TOXIN, 200units;  Surgeon: Shireen Mayo MD;  [facility];  Service: Urology;;    INJECTION OF BOTULINUM TOXIN TYPE A  11/16/2021    Procedure: INJECTION, BOTULINUM TOXIN, 200units;  Surgeon: Shireen Mayo MD;  [facility];  Service: Urology;;    INJECTION OF BOTULINUM TOXIN TYPE A  7/19/2022    Procedure: INJECTION, BOTULINUM TOXIN, 300 units ;  Surgeon: Shirene Mayo MD;  [facility];  Service: Urology;;    INJECTION OF BOTULINUM TOXIN TYPE A N/A 11/14/2023    Procedure: INJECTION, BOTULINUM TOXIN, TYPE A;  Surgeon: Shireen Mayo MD;  Location: [facility] OR [address] [place] [address];  Service: Urology;   Laterality: N/A;    INSERTION, SUPRAPUBIC CATHETER N/A 12/13/2022    Procedure: INSERTION, SUPRAPUBIC CATHETER;  Surgeon: Shireen Mayo MD;  [facility];  Service: Urology;  Laterality: N/A;  exchange    INSERTION, SUPRAPUBIC CATHETER N/A 11/14/2023    Procedure: INSERTION, SUPRAPUBIC CATHETER;  Surgeon: Shireen Mayo MD;  [facility];  Service: Urology;  Laterality: N/A;  EXCHANGE of s/p tube    pain pump placement      SQ Dilaudid Pump managed by Dr. Hillman, Pain Management    REMOVAL OF BONE SPUR OF FOOT Bilateral 9/16/2022    Procedure: EXCISION ARTHRITIC BONE, BILATERAL FOOT;  Surgeon: Adam Mcguire [facility];  Location: [facility] OR;  Service: Podiatry;  Laterality: Bilateral;    REPLACEMENT OF BACLOFEN PUMP N/A 8/2/2023    Procedure: REPLACEMENT, BACLOFEN PUMP;  Surgeon: Smitha Condon MD;  Location: [facility] OR 2ND FLR;  Service: Neurosurgery;  Laterality: N/A;  Anes: Gen  Blood: Type & Screen  Pos: Left Lat  Intrathecal Pump  Bed: Reg Reversed  Rad: C-arm  Pump: 40 cc, [employer]    REPLACEMENT OF CATHETER N/A 10/31/2019    Procedure: REPLACEMENT, CATHETER-SUPRAPUBIC;  Surgeon: Shireen Mayo MD;  Location: [facility] OR [address] [place] [address];  Service: Urology;  Laterality: N/A;    SPINAL CORD STIMULATOR REMOVAL      before Larissa    SPINE SURGERY  5-13-13    CERVICAL FUSION    TONSILLECTOMY       Family History   Problem Relation Name Age of Onset    Cancer Mother  55        breast    Cancer Father          esophagus,had laryngectomy    Esophageal cancer Father      Parkinsonism Maternal Grandmother      Tremor Maternal Grandmother      No Known Problems Brother      No Known Problems Brother      Heart disease Maternal Uncle klarissa     Colon cancer Maternal Uncle klarissa         Less than 60    No Known Problems Sister      No Known Problems Maternal Aunt      Cirrhosis Paternal Aunt          ETOH    Liver disease Paternal Aunt          ETOH    Liver disease Paternal Uncle          ETOH     "Cirrhosis Paternal Uncle          ETOH    No Known Problems Maternal Grandfather      No Known Problems Paternal Grandmother      No Known Problems Paternal Grandfather      Melanoma Neg Hx      Amblyopia Neg Hx      Blindness Neg Hx      Cataracts Neg Hx      Diabetes Neg Hx      Glaucoma Neg Hx      Hypertension Neg Hx      Macular degeneration Neg Hx      Retinal detachment Neg Hx      Strabismus Neg Hx      Stroke Neg Hx      Thyroid disease Neg Hx      Celiac disease Neg Hx      Colon polyps Neg Hx      Cystic fibrosis Neg Hx      Crohn's disease Neg Hx      Inflammatory bowel disease Neg Hx      Liver cancer Neg Hx      Rectal cancer Neg Hx      Stomach cancer Neg Hx      Ulcerative colitis Neg Hx      Lymphoma Neg Hx       Social History     Tobacco Use    Smoking status: Never    Smokeless tobacco: Never   Substance Use Topics    Alcohol use: Never    Drug use: Yes     Types: Marijuana     Comment: "medical marijuana gummies"     Review of Systems  Complete review of systems was conducted and was negative except as per HPI and as marked    Physical Exam     Initial Vitals [05/16/24 2116]   BP Pulse Resp Temp SpO2   112/63 73 18 97.7 °F (36.5 °C) 100 %      MAP       --         Physical Exam  Physical  General: Awake, alert, no acute distress  Head: Normocephalic, atraumatic  Neck: Trachea midline, full range of motion, no meningismus  ENT: Atraumatic, Airway Patent  Cardio: Regular Rate, Regular Rhythm, Heart Sounds Normal, Capillary refill normal  Chest: Atraumatic, No respiratory distress no use of accessory muscles to breath, normal rate, scant bibasilar crackles  Abdomen: Soft, Nontender, no involuntary guarding, rigidity, or rebound.  Upper Ext: Atraumatic, Inspection normal, no swelling  Lower Ext:  Bilateral lower extremities with +4 pitting edema distal to the knees, extensive lymphedema and stigmata of chronic peripheral edema.  There are a few areas less than the size of a quarter that have some " bullae formation bilaterally.  No areas of purulence.  Erythema of legs blanches with palpation.    Neuro: No gross cranial nerve abnormality, no lateralization, no gross sensory or motor deficits  Motley in place, urine appears clear  ED Course   Procedures  Labs Reviewed   CBC W/ AUTO DIFFERENTIAL   COMPREHENSIVE METABOLIC PANEL   B-TYPE NATRIURETIC PEPTIDE   TROPONIN I          Imaging Results    None          Medications   furosemide injection 40 mg (has no administration in time range)   HYDROmorphone injection 1 mg (has no administration in time range)   hydrOXYzine pamoate capsule 25 mg (has no administration in time range)     Medical Decision Making  Amount and/or Complexity of Data Reviewed  Labs: ordered.  Radiology: ordered.    Risk  Prescription drug management.                     68-year-old female presents for painful swelling of both legs.  Patient has a history of CHF, but more pertinently has a history of extensive chronic lymphedema, she is prescribed X XL compression stocks, she uses them sometimes but has not been wanting to use them recently.  She does report that she has been adherent with her medications and lifting her legs, but reports non adherence with fluid restriction.  She specifically states that she drinks 7 up and Coke all day in both her and her  declined to give a specific amount but thinks she drinks close to a 12 pack of them a day, despite what they both report very forthcoming lay that multiple physicians tell them not to do.  I had an extensive discussion with them about fluid restriction and counseled them on the strict importance of this, and that without it her wounds are likely to continue.    Medical screening examination done pertinent to the complaint of acute on chronic bilateral leg swelling was performed.  The history and exam is not consistent with DVT, to any extent that it would be any other day of her life.  She is on her baseline oxygen.  Diuresis  was initiated here in the ED, she is already on maximum Lasix dosage daily.  No other EMC by [facility].  No findings of cellulitis of the legs, all findings are consistent with chronic lymphedema.                 Clinical Impression:  Final diagnoses:  [M79.89] Leg swelling                 Kristian Giordano MD  05/17/24 0149

## 2024-05-17 NOTE — TELEPHONE ENCOUNTER
She has a wound?  If she does, yes to wound care - that would be a clinic appointment until home health is established.  I put in a referral just in case.      Why is she in an Unna Boot?  Because of a wound?

## 2024-05-17 NOTE — DISCHARGE INSTRUCTIONS
Follow-up with your primary care doctor by calling for appointment early next week.  Continue taking her regular home medications including the Lasix twice daily.    You should not be drinking more than 1500 ml of any fluid including sodas daily.  This means no more than 3 cans of water or soda for the whole day, as well as avoiding eating  food with extra salt.  Continue elevating your legs when your rest, and trying to use her compression stockings.  Return to the emergency department if you are having any trouble breathing, or fever.

## 2024-05-20 ENCOUNTER — PATIENT OUTREACH (OUTPATIENT)
Dept: EMERGENCY MEDICINE | Facility: HOSPITAL | Age: 69
End: 2024-05-20
Payer: MEDICARE

## 2024-05-20 LAB
OHS QRS DURATION: 106 MS
OHS QTC CALCULATION: 482 MS

## 2024-05-20 NOTE — PROGRESS NOTES
Patient was seen in the ED on 5/17/24 for CHF. Patient was contacted for post ED discharge navigation. They have an appointment scheduled with Dr. Mesfin Hodges on 5/30/24 at 11:20. ED navigator will remind patient of appointment.

## 2024-05-22 ENCOUNTER — HOSPITAL ENCOUNTER (INPATIENT)
Facility: HOSPITAL | Age: 69
LOS: 6 days | Discharge: HOME HEALTH | DRG: 602 | End: 2024-05-29
Attending: EMERGENCY MEDICINE | Admitting: HOSPITALIST
Payer: MEDICARE

## 2024-05-22 DIAGNOSIS — I50.30 (HFPEF) HEART FAILURE WITH PRESERVED EJECTION FRACTION: ICD-10-CM

## 2024-05-22 DIAGNOSIS — G47.01 INSOMNIA DUE TO MEDICAL CONDITION: Chronic | ICD-10-CM

## 2024-05-22 DIAGNOSIS — L03.116 BILATERAL CELLULITIS OF LOWER LEG: ICD-10-CM

## 2024-05-22 DIAGNOSIS — T14.8XXA SKIN EXCORIATION: ICD-10-CM

## 2024-05-22 DIAGNOSIS — L03.115 BILATERAL CELLULITIS OF LOWER LEG: ICD-10-CM

## 2024-05-22 DIAGNOSIS — G89.4 CHRONIC PAIN SYNDROME: Chronic | ICD-10-CM

## 2024-05-22 DIAGNOSIS — I89.0 LYMPHEDEMA: Primary | ICD-10-CM

## 2024-05-22 DIAGNOSIS — E27.40 ADRENAL INSUFFICIENCY: ICD-10-CM

## 2024-05-22 DIAGNOSIS — E03.9 HYPOTHYROIDISM (ACQUIRED): ICD-10-CM
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
## 2024-05-22 DIAGNOSIS — R07.9 CHEST PAIN: ICD-10-CM

## 2024-05-22 DIAGNOSIS — S92.901G CLOSED MULTIPLE FRACTURES OF RIGHT FOOT WITH DELAYED HEALING, SUBSEQUENT ENCOUNTER: ICD-10-CM

## 2024-05-22 PROBLEM — G47.00 INSOMNIA: Status: ACTIVE | Noted: 2024-05-22

## 2024-05-22 PROBLEM — G93.41 ENCEPHALOPATHY, METABOLIC: Status: ACTIVE | Noted: 2024-05-22

## 2024-05-22 PROBLEM — J44.9 COPD (CHRONIC OBSTRUCTIVE PULMONARY DISEASE): Status: ACTIVE | Noted: 2024-05-22

## 2024-05-22 LAB
ALBUMIN SERPL BCP-MCNC: 3 G/DL (ref 3.5–5.2)
ALLENS TEST: ABNORMAL
ALP SERPL-CCNC: 143 U/L (ref 55–135)
ALT SERPL W/O P-5'-P-CCNC: 14 U/L (ref 10–44)
ANION GAP SERPL CALC-SCNC: 9 MMOL/L (ref 8–16)
AST SERPL-CCNC: 17 U/L (ref 10–40)
BACTERIA #/AREA URNS AUTO: ABNORMAL /HPF
BASOPHILS # BLD AUTO: 0.02 K/UL (ref 0–0.2)
BASOPHILS NFR BLD: 0.4 % (ref 0–1.9)
BILIRUB SERPL-MCNC: 0.3 MG/DL (ref 0.1–1)
BILIRUB UR QL STRIP: NEGATIVE
BNP SERPL-MCNC: 27 PG/ML (ref 0–99)
BUN SERPL-MCNC: 7 MG/DL (ref 8–23)
CALCIUM SERPL-MCNC: 9.1 MG/DL (ref 8.7–10.5)
CHLORIDE SERPL-SCNC: 107 MMOL/L (ref 95–110)
CLARITY UR REFRACT.AUTO: CLEAR
CO2 SERPL-SCNC: 25 MMOL/L (ref 23–29)
COLOR UR AUTO: COLORLESS
CREAT SERPL-MCNC: 0.8 MG/DL (ref 0.5–1.4)
DIFFERENTIAL METHOD BLD: ABNORMAL
EOSINOPHIL # BLD AUTO: 0.3 K/UL (ref 0–0.5)
EOSINOPHIL NFR BLD: 5.3 % (ref 0–8)
ERYTHROCYTE [DISTWIDTH] IN BLOOD BY AUTOMATED COUNT: 13.7 % (ref 11.5–14.5)
EST. GFR  (NO RACE VARIABLE): >60 ML/MIN/1.73 M^2
GLUCOSE SERPL-MCNC: 95 MG/DL (ref 70–110)
GLUCOSE UR QL STRIP: NEGATIVE
HCO3 UR-SCNC: 31.5 MMOL/L (ref 24–28)
HCT VFR BLD AUTO: 34.4 % (ref 37–48.5)
HCT VFR BLD CALC: 36 %PCV (ref 36–54)
HGB BLD-MCNC: 10.6 G/DL (ref 12–16)
HGB UR QL STRIP: ABNORMAL
IMM GRANULOCYTES # BLD AUTO: 0.01 K/UL (ref 0–0.04)
IMM GRANULOCYTES NFR BLD AUTO: 0.2 % (ref 0–0.5)
KETONES UR QL STRIP: NEGATIVE
LEUKOCYTE ESTERASE UR QL STRIP: ABNORMAL
LYMPHOCYTES # BLD AUTO: 1.4 K/UL (ref 1–4.8)
LYMPHOCYTES NFR BLD: 26 % (ref 18–48)
MCH RBC QN AUTO: 27.5 PG (ref 27–31)
MCHC RBC AUTO-ENTMCNC: 30.8 G/DL (ref 32–36)
MCV RBC AUTO: 89 FL (ref 82–98)
MICROSCOPIC COMMENT: ABNORMAL
MONOCYTES # BLD AUTO: 0.6 K/UL (ref 0.3–1)
MONOCYTES NFR BLD: 11.5 % (ref 4–15)
NEUTROPHILS # BLD AUTO: 3.1 K/UL (ref 1.8–7.7)
NEUTROPHILS NFR BLD: 56.6 % (ref 38–73)
NITRITE UR QL STRIP: NEGATIVE
NRBC BLD-RTO: 0 /100 WBC
OHS QRS DURATION: 112 MS
OHS QTC CALCULATION: 456 MS
PCO2 BLDA: 54.5 MMHG (ref 35–45)
PH SMN: 7.37 [PH] (ref 7.35–7.45)
PH UR STRIP: 6 [PH] (ref 5–8)
PLATELET # BLD AUTO: 224 K/UL (ref 150–450)
PMV BLD AUTO: 10.1 FL (ref 9.2–12.9)
PO2 BLDA: 102 MMHG (ref 80–100)
POC BE: 6 MMOL/L
POC IONIZED CALCIUM: 1.25 MMOL/L (ref 1.06–1.42)
POC SATURATED O2: 98 % (ref 95–100)
POC TCO2: 33 MMOL/L (ref 23–27)
POTASSIUM BLD-SCNC: 3.6 MMOL/L (ref 3.5–5.1)
POTASSIUM SERPL-SCNC: 3.8 MMOL/L (ref 3.5–5.1)
PROT SERPL-MCNC: 6.5 G/DL (ref 6–8.4)
PROT UR QL STRIP: NEGATIVE
RBC # BLD AUTO: 3.85 M/UL (ref 4–5.4)
RBC #/AREA URNS AUTO: 10 /HPF (ref 0–4)
SAMPLE: ABNORMAL
SITE: ABNORMAL
SODIUM BLD-SCNC: 142 MMOL/L (ref 136–145)
SODIUM SERPL-SCNC: 141 MMOL/L (ref 136–145)
SP GR UR STRIP: 1.01 (ref 1–1.03)
T4 FREE SERPL-MCNC: 0.69 NG/DL (ref 0.71–1.51)
TROPONIN I SERPL DL<=0.01 NG/ML-MCNC: <0.006 NG/ML (ref 0–0.03)
TSH SERPL DL<=0.005 MIU/L-ACNC: 26.18 UIU/ML (ref 0.4–4)
URN SPEC COLLECT METH UR: ABNORMAL
WBC # BLD AUTO: 5.47 K/UL (ref 3.9–12.7)
WBC #/AREA URNS AUTO: 2 /HPF (ref 0–5)

## 2024-05-22 PROCEDURE — 99285 EMERGENCY DEPT VISIT HI MDM: CPT | Mod: 25,HCNC

## 2024-05-22 PROCEDURE — G0378 HOSPITAL OBSERVATION PER HR: HCPCS | Mod: HCNC

## 2024-05-22 PROCEDURE — 83880 ASSAY OF NATRIURETIC PEPTIDE: CPT | Mod: HCNC

## 2024-05-22 PROCEDURE — 84295 ASSAY OF SERUM SODIUM: CPT | Mod: HCNC

## 2024-05-22 PROCEDURE — 27000221 HC OXYGEN, UP TO 24 HOURS: Mod: HCNC

## 2024-05-22 PROCEDURE — 99900035 HC TECH TIME PER 15 MIN (STAT): Mod: HCNC

## 2024-05-22 PROCEDURE — 84439 ASSAY OF FREE THYROXINE: CPT | Mod: HCNC

## 2024-05-22 PROCEDURE — 85014 HEMATOCRIT: CPT | Mod: HCNC

## 2024-05-22 PROCEDURE — 84443 ASSAY THYROID STIM HORMONE: CPT | Mod: HCNC

## 2024-05-22 PROCEDURE — 84132 ASSAY OF SERUM POTASSIUM: CPT | Mod: HCNC

## 2024-05-22 PROCEDURE — 96375 TX/PRO/DX INJ NEW DRUG ADDON: CPT | Mod: HCNC

## 2024-05-22 PROCEDURE — 85025 COMPLETE CBC W/AUTO DIFF WBC: CPT | Mod: HCNC

## 2024-05-22 PROCEDURE — 36600 WITHDRAWAL OF ARTERIAL BLOOD: CPT | Mod: HCNC

## 2024-05-22 PROCEDURE — 81001 URINALYSIS AUTO W/SCOPE: CPT | Mod: HCNC

## 2024-05-22 PROCEDURE — 63600175 PHARM REV CODE 636 W HCPCS: Mod: HCNC

## 2024-05-22 PROCEDURE — 93005 ELECTROCARDIOGRAM TRACING: CPT | Mod: HCNC

## 2024-05-22 PROCEDURE — 25000003 PHARM REV CODE 250: Mod: HCNC | Performed by: INTERNAL MEDICINE

## 2024-05-22 PROCEDURE — 82330 ASSAY OF CALCIUM: CPT | Mod: HCNC

## 2024-05-22 PROCEDURE — 82803 BLOOD GASES ANY COMBINATION: CPT | Mod: HCNC

## 2024-05-22 PROCEDURE — 96372 THER/PROPH/DIAG INJ SC/IM: CPT

## 2024-05-22 PROCEDURE — 25000003 PHARM REV CODE 250: Mod: HCNC

## 2024-05-22 PROCEDURE — 80053 COMPREHEN METABOLIC PANEL: CPT | Mod: HCNC

## 2024-05-22 PROCEDURE — 93010 ELECTROCARDIOGRAM REPORT: CPT | Mod: HCNC,,, | Performed by: INTERNAL MEDICINE

## 2024-05-22 PROCEDURE — 96374 THER/PROPH/DIAG INJ IV PUSH: CPT | Mod: HCNC

## 2024-05-22 PROCEDURE — 84484 ASSAY OF TROPONIN QUANT: CPT | Mod: HCNC

## 2024-05-22 RX ORDER — SENNOSIDES 8.6 MG/1
2 TABLET ORAL 2 TIMES DAILY
Status: DISCONTINUED | OUTPATIENT
Start: 2024-05-22 | End: 2024-05-28

## 2024-05-22 RX ORDER — DULOXETIN HYDROCHLORIDE 60 MG/1
60 CAPSULE, DELAYED RELEASE ORAL 2 TIMES DAILY
Status: DISCONTINUED | OUTPATIENT
Start: 2024-05-22 | End: 2024-05-22

## 2024-05-22 RX ORDER — SIMETHICONE 80 MG
1 TABLET,CHEWABLE ORAL 4 TIMES DAILY PRN
Status: DISCONTINUED | OUTPATIENT
Start: 2024-05-22 | End: 2024-05-29 | Stop reason: HOSPADM

## 2024-05-22 RX ORDER — HYDROXYZINE HYDROCHLORIDE 25 MG/1
50 TABLET, FILM COATED ORAL EVERY 4 HOURS PRN
Status: DISCONTINUED | OUTPATIENT
Start: 2024-05-22 | End: 2024-05-23

## 2024-05-22 RX ORDER — OXYBUTYNIN CHLORIDE 5 MG/1
5 TABLET, EXTENDED RELEASE ORAL DAILY
Status: DISCONTINUED | OUTPATIENT
Start: 2024-05-23 | End: 2024-05-26

## 2024-05-22 RX ORDER — LEVOTHYROXINE SODIUM 150 UG/1
150 TABLET ORAL
Status: DISCONTINUED | OUTPATIENT
Start: 2024-05-23 | End: 2024-05-29 | Stop reason: HOSPADM

## 2024-05-22 RX ORDER — IPRATROPIUM BROMIDE AND ALBUTEROL SULFATE 2.5; .5 MG/3ML; MG/3ML
3 SOLUTION RESPIRATORY (INHALATION) EVERY 4 HOURS PRN
Status: DISCONTINUED | OUTPATIENT
Start: 2024-05-22 | End: 2024-05-29 | Stop reason: HOSPADM

## 2024-05-22 RX ORDER — AMITRIPTYLINE HYDROCHLORIDE 25 MG/1
25 TABLET, FILM COATED ORAL NIGHTLY
Status: DISCONTINUED | OUTPATIENT
Start: 2024-05-22 | End: 2024-05-22

## 2024-05-22 RX ORDER — IBUPROFEN 200 MG
24 TABLET ORAL
Status: DISCONTINUED | OUTPATIENT
Start: 2024-05-22 | End: 2024-05-29 | Stop reason: HOSPADM

## 2024-05-22 RX ORDER — ONDANSETRON 8 MG/1
8 TABLET, ORALLY DISINTEGRATING ORAL EVERY 8 HOURS PRN
Status: DISCONTINUED | OUTPATIENT
Start: 2024-05-22 | End: 2024-05-29 | Stop reason: HOSPADM

## 2024-05-22 RX ORDER — MORPHINE SULFATE 2 MG/ML
2 INJECTION, SOLUTION INTRAMUSCULAR; INTRAVENOUS
Status: COMPLETED | OUTPATIENT
Start: 2024-05-22 | End: 2024-05-22

## 2024-05-22 RX ORDER — LINEZOLID 2 MG/ML
600 INJECTION, SOLUTION INTRAVENOUS
Status: DISCONTINUED | OUTPATIENT
Start: 2024-05-22 | End: 2024-05-23

## 2024-05-22 RX ORDER — GLUCAGON 1 MG
1 KIT INJECTION
Status: DISCONTINUED | OUTPATIENT
Start: 2024-05-22 | End: 2024-05-29 | Stop reason: HOSPADM

## 2024-05-22 RX ORDER — HYDROCORTISONE 5 MG/1
10 TABLET ORAL NIGHTLY
Status: DISCONTINUED | OUTPATIENT
Start: 2024-05-22 | End: 2024-05-23

## 2024-05-22 RX ORDER — PANTOPRAZOLE SODIUM 40 MG/1
40 TABLET, DELAYED RELEASE ORAL DAILY
Status: DISCONTINUED | OUTPATIENT
Start: 2024-05-23 | End: 2024-05-29 | Stop reason: HOSPADM

## 2024-05-22 RX ORDER — TRAZODONE HYDROCHLORIDE 50 MG/1
150 TABLET ORAL NIGHTLY
Status: DISCONTINUED | OUTPATIENT
Start: 2024-05-22 | End: 2024-05-22

## 2024-05-22 RX ORDER — POLYETHYLENE GLYCOL 3350 17 G/17G
17 POWDER, FOR SOLUTION ORAL 2 TIMES DAILY PRN
Status: DISCONTINUED | OUTPATIENT
Start: 2024-05-22 | End: 2024-05-28

## 2024-05-22 RX ORDER — FUROSEMIDE 10 MG/ML
80 INJECTION INTRAMUSCULAR; INTRAVENOUS
Status: COMPLETED | OUTPATIENT
Start: 2024-05-22 | End: 2024-05-22

## 2024-05-22 RX ORDER — QUETIAPINE FUMARATE 100 MG/1
100 TABLET, FILM COATED ORAL NIGHTLY
Status: DISCONTINUED | OUTPATIENT
Start: 2024-05-22 | End: 2024-05-22

## 2024-05-22 RX ORDER — KETOROLAC TROMETHAMINE 15 MG/ML
15 INJECTION, SOLUTION INTRAMUSCULAR; INTRAVENOUS EVERY 6 HOURS PRN
Status: DISPENSED | OUTPATIENT
Start: 2024-05-22 | End: 2024-05-25

## 2024-05-22 RX ORDER — BISACODYL 10 MG/1
10 SUPPOSITORY RECTAL DAILY PRN
Status: DISCONTINUED | OUTPATIENT
Start: 2024-05-22 | End: 2024-05-29 | Stop reason: HOSPADM

## 2024-05-22 RX ORDER — ENOXAPARIN SODIUM 100 MG/ML
40 INJECTION SUBCUTANEOUS EVERY 24 HOURS
Status: DISCONTINUED | OUTPATIENT
Start: 2024-05-22 | End: 2024-05-29 | Stop reason: HOSPADM

## 2024-05-22 RX ORDER — SODIUM CHLORIDE 0.9 % (FLUSH) 0.9 %
5 SYRINGE (ML) INJECTION
Status: DISCONTINUED | OUTPATIENT
Start: 2024-05-22 | End: 2024-05-29 | Stop reason: HOSPADM

## 2024-05-22 RX ORDER — TALC
6 POWDER (GRAM) TOPICAL NIGHTLY PRN
Status: DISCONTINUED | OUTPATIENT
Start: 2024-05-22 | End: 2024-05-29 | Stop reason: HOSPADM

## 2024-05-22 RX ORDER — ALUMINUM HYDROXIDE, MAGNESIUM HYDROXIDE, AND SIMETHICONE 1200; 120; 1200 MG/30ML; MG/30ML; MG/30ML
30 SUSPENSION ORAL 4 TIMES DAILY PRN
Status: DISCONTINUED | OUTPATIENT
Start: 2024-05-22 | End: 2024-05-29 | Stop reason: HOSPADM

## 2024-05-22 RX ORDER — IBUPROFEN 200 MG
16 TABLET ORAL
Status: DISCONTINUED | OUTPATIENT
Start: 2024-05-22 | End: 2024-05-29 | Stop reason: HOSPADM

## 2024-05-22 RX ORDER — ATORVASTATIN CALCIUM 40 MG/1
80 TABLET, FILM COATED ORAL DAILY
Status: DISCONTINUED | OUTPATIENT
Start: 2024-05-23 | End: 2024-05-29 | Stop reason: HOSPADM

## 2024-05-22 RX ORDER — PROCHLORPERAZINE EDISYLATE 5 MG/ML
5 INJECTION INTRAMUSCULAR; INTRAVENOUS EVERY 6 HOURS PRN
Status: DISCONTINUED | OUTPATIENT
Start: 2024-05-22 | End: 2024-05-29 | Stop reason: HOSPADM

## 2024-05-22 RX ADMIN — LINEZOLID 600 MG: 600 INJECTION, SOLUTION INTRAVENOUS at 05:05

## 2024-05-22 RX ADMIN — FUROSEMIDE 80 MG: 10 INJECTION, SOLUTION INTRAVENOUS at 01:05

## 2024-05-22 RX ADMIN — ENOXAPARIN SODIUM 40 MG: 40 INJECTION SUBCUTANEOUS at 05:05

## 2024-05-22 RX ADMIN — SENNOSIDES 2 TABLET: 8.6 TABLET, FILM COATED ORAL at 08:05

## 2024-05-22 RX ADMIN — KETOROLAC TROMETHAMINE 15 MG: 15 INJECTION, SOLUTION INTRAMUSCULAR; INTRAVENOUS at 06:05

## 2024-05-22 RX ADMIN — MORPHINE SULFATE 2 MG: 2 INJECTION, SOLUTION INTRAMUSCULAR; INTRAVENOUS at 01:05

## 2024-05-22 RX ADMIN — HYDROCORTISONE 10 MG: 5 TABLET ORAL at 08:05

## 2024-05-22 RX ADMIN — FLUDROCORTISONE ACETATE 50 MCG: 0.1 TABLET ORAL at 10:05

## 2024-05-22 RX ADMIN — DOCUSATE SODIUM 200 MG: 50 CAPSULE, LIQUID FILLED ORAL at 08:05

## 2024-05-22 NOTE — ASSESSMENT & PLAN NOTE
- Patient has acute metabolic encephalopathy unsure of etiology  - Labs with unremarkable   - UA, CXR, lactic and blood cultures pending  - CT head pending   - VBG pending  - bilateral LE with redness, start emperic linezolid for possible cellulitis   - Treat the underlying cause and monitor for improvement  - Monitor with q4 neuro checks  - Avoid narcotics and benzos that will exacerbate agitation, and use PRN anti-psychotics for controls of behavior for self harm.    - ddx: infection, uncontrolled hypothyroidism, polypharmacy, medication mismanagement, hypercapnia, worsening dementia, delirium  - Delirium precautions

## 2024-05-22 NOTE — PLAN OF CARE
Endocrine Plan of Care: 05/22/2024    Consult received for abnormal thyroid labs.  Expect full consult tomorrow (5/23/24)    Review of chart shows this patient has a long standing history of hypothyroidism with prior use of Levothyroxine and Liothyronine.  More recently on chart review she appears to be on 150 mcg of Levothyroxine.  Compliance is unknown at this time but prior chart review shows there has been concerns for compliance issues with her thyroid medication at times.      Lab Results   Component Value Date    TSH 26.179 (H) 05/22/2024    P2EXWYK 5.2 05/17/2020    FREET4 0.69 (L) 05/22/2024     Previously seen in the outpatient endocrine clinic for evaluation of adrenal insufficiency concerns (June 2023).  Had ACTH stimulation testing performed in the months prior to that visit with results as below.  Performed due to concerns for long standing opioid use causing AI.  Stimulation was noted to be in the setting of known hypoalbuminemia.  Due to cortisol being less than 18 the patient was placed on florinef and hydrocortisone and has been on this ever since.      Latest Reference Range & Units 01/16/23 07:57 01/16/23 08:35 01/16/23 09:10   Cortisol ug/dL 8.60 16.40 17.20     The stimulation testing appears to have been done in the setting of hypotension during a hospital admission when patient was noted to be on a dilaudid pain pump and needing vasopressor support.    Hydrocortisone and florinef doses were decreased on last endocrine visit but ultimately appear to be an ongoing medication    Plan:    - Regarding adrenal insuffiencey diagnosis in the past, the ACTH stim testing was fairly unremarkable.  We may look at holding hydrocortisone and fludrocortisone in the coming days and performing a repeat ACTH stimulation test.  Ok to continue for now.      - Regarding Hypothyroidism the major concern here is for non-compliance.  Need to obtain accurate history from the patient to see if she has been missing  doses of medication if she is reliable to do so.  Weight based dosing would be 156 mcg daily.  Her home dose is similar to this so the current labs with low Free T4 and elevated TSH are suspicious for non-compliance.      - Ok to continue the Levothyroxine 150 mcg dose for now, once daily in the morning.  If unable to take oral pills for any reason then she needs 70% of the oral dosing given via daily IV injection (approx 100 mcg IV)  We will re-evaluate when we see her in person.      Betito Andrade DO  Endocrinology

## 2024-05-22 NOTE — ASSESSMENT & PLAN NOTE
- continue synthroid -- unsure if patient has been compliant though  - TSH 26.179, T4 0.69  - consult endocrine

## 2024-05-22 NOTE — ED NOTES
Patient comes into the emergency department by EMS with complaints of mechanical fall. Patient states that she has had multiple falls over the past few days due to her chronic back pain. Pt reports falling onto her knees/legs, reports leg pain. Pt rpeorts hx of falls, chronic leg pain/edema, chronic back pain. Patient denies hitting head/LOC.    LOC: The patient is awake, alert and aware of environment with an appropriate affect, the patient is oriented x 3 and speaking appropriately.   APPEARANCE: Patient appears comfortable and in no acute distress, patient is clean and well groomed.  SKIN: The skin is warm and dry, color consistent with ethnicity, patient has normal skin turgor and moist mucus membranes. Pt has a bruise on left side of abdomen, lower extremities edematous.  MUSCULOSKELETAL: Patient moving all extremities spontaneously, lower extremities edematous, pt reports using walker and wheelchair at home. Pt also reports right shoulder pain, unable to lift arm past 90 degree angle, marked weakness in strength compared to left arm.  RESPIRATORY: Airway is open and patent, respirations are spontaneous, patient has a normal effort and rate, no accessory muscle. Pt arrived to ED with 3L via NC, uses 3-4L for home oxygen.  CARDIAC: Pt placed on cardiac monitor. Patient has a normal rate and regular rhythm, no edema noted, capillary refill < 3 seconds.   GASTRO: Soft and non tender to palpation, no distention noted.  : Pt denies any pain or frequency with urination. Pt has suprapubic catheter in place prior to arrival.  NEURO: Pt opens eyes spontaneously, behavior appropriate to situation, follows commands, facial expression symmetrical, bilateral hand grasp equal and even, purposeful motor response noted, normal sensation in all extremities when touched with a finger.

## 2024-05-22 NOTE — ASSESSMENT & PLAN NOTE
Physical deconditioning   Hemiplegia and hemiparesis following cerebral infarction affecting left non-dominant side  - recent stay in SNF, has not been able to take care of herself at home  - CM will discuss additional resources following d/c  - CT head pending  - fall precautions   - consider PT/OT consult but not likely a candidate as she was just discharged

## 2024-05-22 NOTE — PLAN OF CARE
Pt was sleeping and would not arouse to complete assessment.    SW/CM to follow-up      Bridget Crocker CD, MSW, LMSW, RSW   Case Management  [facility]  Email: [email or web address]

## 2024-05-22 NOTE — SUBJECTIVE & OBJECTIVE
Past Medical History:   Diagnosis Date    Anxiety     Arthritis     Bilateral lower extremity edema     severe chronic    Carotid artery occlusion     Cataract     CHF (congestive heart failure)     Coronary artery disease     subtotalled LAD with collateral    Depression     Fever blister     Hard of hearing     Hypokalemia 01/09/2023    Hyponatremia 02/04/2022    Hypothyroid     Iron deficiency anemia     Lumbar radiculopathy     with chronic pain    Ocular migraine     Other emphysema 05/22/2023    Renal disorder     Sleep apnea     cpap       Past Surgical History:   Procedure Laterality Date    ADENOIDECTOMY      BACK SURGERY      x 12    CARDIAC CATHETERIZATION  2016    subtotalled LAD with right to left collaterals    CATARACT EXTRACTION W/  INTRAOCULAR LENS IMPLANT Left     Dr Coleman     CYSTOSCOPIC LITHOLAPAXY N/A 6/27/2019    Procedure: CYSTOLITHOLAPAXY;  Surgeon: Shireen Mayo MD;  Location: [facility] OR 2ND FLR;  Service: Urology;  Laterality: N/A;    CYSTOSCOPIC LITHOLAPAXY N/A 9/3/2019    Procedure: CYSTOLITHOLAPAXY;  Surgeon: Shireen Mayo MD;  Location: [facility] OR 2ND FLR;  Service: Urology;  Laterality: N/A;    CYSTOSCOPY N/A 7/13/2021    Procedure: CYSTOSCOPY;  Surgeon: Shireen Mayo MD;  Location: [facility] OR 1ST FLR;  Service: Urology;  Laterality: N/A;    CYSTOSCOPY  11/16/2021    Procedure: CYSTOSCOPY;  Surgeon: Shireen Mayo MD;  Location: [facility] OR 1ST FLR;  Service: Urology;;    CYSTOSCOPY  7/19/2022    Procedure: CYSTOSCOPY;  Surgeon: Shireen Mayo MD;  Location: [facility] OR 1ST FLR;  Service: Urology;;    CYSTOSCOPY WITH INJECTION OF PERIURETHRAL BULKING AGENT  7/19/2022    Procedure: CYSTOSCOPY, WITH PERIURETHRAL BULKING AGENT INJECTION-MACROPLASTIQUE;  Surgeon: Shireen Mayo MD;  Location: [facility] OR 1ST FLR;  Service: Urology;;    CYSTOSCOPY WITH INJECTION OF PERIURETHRAL BULKING AGENT N/A 3/28/2023    Procedure: CYSTOSCOPY, WITH PERIURETHRAL BULKING AGENT INJECTION;  Surgeon:  Shireen Mayo MD;  [facility];  Service: Urology;  Laterality: N/A;  [place]    CYSTOSCOPY WITH INJECTION OF PERIURETHRAL BULKING AGENT N/A 11/14/2023    Procedure: CYSTOSCOPY, WITH PERIURETHRAL BULKING AGENT INJECTION;  Surgeon: Shireen Mayo MD;  [facility];  Service: Urology;  Laterality: N/A;    CYSTOSCOPY,WITH BOTULINUM TOXIN INJECTION N/A 12/13/2022    Procedure: CYSTOSCOPY,WITH BOTULINUM TOXIN INJECTION;  Surgeon: Shireen Mayo MD;  [facility];  Service: Urology;  Laterality: N/A;  300 U    CYSTOSCOPY,WITH BOTULINUM TOXIN INJECTION N/A 3/28/2023    Procedure: CYSTOSCOPY,WITH BOTULINUM TOXIN INJECTION;  Surgeon: Shireen Mayo MD;  [facility];  Service: Urology;  Laterality: N/A;  45 MIN.    300 UNITS    ESOPHAGOGASTRODUODENOSCOPY N/A 5/23/2018    Procedure: ESOPHAGOGASTRODUODENOSCOPY (EGD);  Surgeon: Prince Vance MD;  Location: [facility] (4TH FLR);  Service: Endoscopy;  Laterality: N/A;  r/s 'd per Dr. Vance due to family emergency- ER    ESOPHAGOGASTRODUODENOSCOPY N/A 6/23/2023    Procedure: EGD (ESOPHAGOGASTRODUODENOSCOPY);  Surgeon: Juaquin Barry MD;  Location: [facility] ENDO (2ND FLR);  Service: Endoscopy;  Laterality: N/A;  Refferal: PRINCE VANCE  Order  tele [facility] 5/18/23  dx: history of a Nissen fundoplication  Additional Scheduling Information:  On home oxygen 2nd floor for airway protection also with a pain pump elevated BMI close to 40   Prep Gastroparesis   ins    HYSTERECTOMY  1975    endometriosis    INJECTION OF BOTULINUM TOXIN TYPE A  7/13/2021    Procedure: INJECTION, BOTULINUM TOXIN, 200units;  Surgeon: Shireen Mayo MD;  [facility];  Service: Urology;;    INJECTION OF BOTULINUM TOXIN TYPE A  11/16/2021    Procedure: INJECTION, BOTULINUM TOXIN, 200units;  Surgeon: Shireen Mayo MD;  Location: [facility] OR [address] [place] [address];  Service: Urology;;    INJECTION OF BOTULINUM TOXIN TYPE A  7/19/2022     Procedure: INJECTION, BOTULINUM TOXIN, 300 units ;  Surgeon: Shireen Mayo MD;  [facility];  Service: Urology;;    INJECTION OF BOTULINUM TOXIN TYPE A N/A 11/14/2023    Procedure: INJECTION, BOTULINUM TOXIN, TYPE A;  Surgeon: Shireen Mayo MD;  [facility];  Service: Urology;  Laterality: N/A;    INSERTION, SUPRAPUBIC CATHETER N/A 12/13/2022    Procedure: INSERTION, SUPRAPUBIC CATHETER;  Surgeon: Shireen Mayo MD;  [facility];  Service: Urology;  Laterality: N/A;  exchange    INSERTION, SUPRAPUBIC CATHETER N/A 11/14/2023    Procedure: INSERTION, SUPRAPUBIC CATHETER;  Surgeon: Shireen Mayo MD;  [facility];  Service: Urology;  Laterality: N/A;  EXCHANGE of s/p tube    pain pump placement      SQ Dilaudid Pump managed by Dr. Hillman, Pain Management    REMOVAL OF BONE SPUR OF FOOT Bilateral 9/16/2022    Procedure: EXCISION ARTHRITIC BONE, BILATERAL FOOT;  Surgeon: NICOLA Mcgrath;  Location: [facility] OR;  Service: Podiatry;  Laterality: Bilateral;    REPLACEMENT OF BACLOFEN PUMP N/A 8/2/2023    Procedure: REPLACEMENT, BACLOFEN PUMP;  Surgeon: Smitha Condon MD;  [facility];  Service: Neurosurgery;  Laterality: N/A;  Anes: Gen  Blood: Type & Screen  Pos: Left Lat  Intrathecal Pump  Bed: Reg Reversed  Rad: C-arm  Pump: 40 cc, medtronics    REPLACEMENT OF CATHETER N/A 10/31/2019    Procedure: REPLACEMENT, CATHETER-SUPRAPUBIC;  Surgeon: Shireen Mayo MD;  [facility];  Service: Urology;  Laterality: N/A;    SPINAL CORD STIMULATOR REMOVAL      before [place]    SPINE SURGERY  5-13-13    CERVICAL FUSION    TONSILLECTOMY         Review of patient's allergies indicates:   Allergen Reactions    (d)-limonene flavor      Other reaction(s): difficult intubation  Other reaction(s): Difficulty breathing    Benadryl [diphenhydramine hcl] Shortness Of Breath    Fentanyl Itching, Nausea And Vomiting and Swelling              Imitrex [sumatriptan succinate] Shortness Of Breath    Oxycodone-acetaminophen Shortness Of Breath    Sulfamethoxazole-trimethoprim Anaphylaxis    Topiramate Shortness Of Breath    Vancomycin Anaphylaxis     Rash    Butorphanol tartrate     Evening primrose (oenothera biennis)     Latex     Pregabalin Other (See Comments)     tremors    Propoxyphene n-acetaminophen      Other reaction(s): Difficulty breathing    Sumatriptan     White petrolatum-zinc     Zinc acetate     Zinc oxide-white petrolatum      Other reaction(s): Difficulty breathing    Latex, natural rubber Itching and Rash    Phenytoin Rash and Other (See Comments)     Trouble breathing       No current facility-administered medications on file prior to encounter.     Current Outpatient Medications on File Prior to Encounter   Medication Sig    amitriptyline (ELAVIL) 25 MG tablet Take 1 tablet (25 mg total) by mouth every evening.    atorvastatin (LIPITOR) 80 MG tablet Take 1 tablet (80 mg total) by mouth once daily.    butalbital-acetaminophen-caffeine -40 mg (FIORICET, ESGIC) -40 mg per tablet Take 1 tablet by mouth daily as needed.    cyanocobalamin, vitamin B-12, 5,000 mcg Subl Place 1 tablet under the tongue once daily.    darifenacin (ENABLEX) 7.5 MG Tb24 Take 1 tablet (7.5 mg total) by mouth once daily.    docusate sodium (COLACE) 100 MG capsule Take 200 mg by mouth every evening.    DULoxetine (CYMBALTA) 60 MG capsule Take 1 capsule (60 mg total) by mouth 2 (two) times daily.    fludrocortisone (FLORINEF) 0.1 mg Tab Take a half-tablet (50 mcg) by mouth once daily    furosemide (LASIX) 40 MG tablet Take 1 tablet (40 mg total) by mouth once daily.    hydrocortisone (CORTEF) 10 MG Tab Take 1 tablet (10 mg total) by mouth every evening.    hydrOXYzine (ATARAX) 50 MG tablet Take 1 tablet (50 mg total) by mouth every 4 (four) hours as needed for Anxiety.    levothyroxine (SYNTHROID) 150 MCG tablet Take 1 tablet (150 mcg total) by  mouth before breakfast.    pantoprazole (PROTONIX) 40 MG tablet Take 1 tablet (40 mg total) by mouth once daily.    potassium chloride (MICRO-K) 10 MEQ CpSR Take 2 capsules (20 mEq total) by mouth once daily.    promethazine (PHENERGAN) 25 MG tablet Take 1 tablet (25 mg total) by mouth every 6 (six) hours as needed for Nausea.    QUEtiapine (SEROQUEL) 100 MG Tab TAKE 1 TABLET (100 MG) BY MOUTH NIGHTLY    traMADoL (ULTRAM) 50 mg tablet Take 1 tablet (50 mg total) by mouth every 4 (four) hours as needed for Pain.    traZODone (DESYREL) 300 MG tablet Take 0.5 tablets (150 mg total) by mouth every evening.    aspirin (ECOTRIN) 81 MG EC tablet Take 1 tablet (81 mg total) by mouth once daily.    calcium-vitamin D3 (OS-JACKIE 500 + D3) 500 mg-5 mcg (200 unit) per tablet Take 2 tablets by mouth 2 (two) times daily.    [] fluconazole (DIFLUCAN) 200 MG Tab Take 1 tablet (200 mg total) by mouth once daily. for 7 days    HYDROcodone-acetaminophen (NORCO)  mg per tablet Take 1 tablet by mouth every 6 (six) hours as needed for Pain.    intrathecal pain pump compound Hydromorphone (7.5 mg/mL) infusion at 8.59 mg/day (0.3578 mg/hr) out of a total reservoir volume of 40 mL  Pump filled monthly    LIDOcaine (LIDODERM) 5 % Place 1 patch onto the skin once daily. Remove & Discard patch within 12 hours or as directed by MD    linaCLOtide (LINZESS) 72 mcg Cap capsule Take 1 capsule (72 mcg total) by mouth before breakfast.    nitroGLYCERIN (NITROSTAT) 0.4 MG SL tablet Place 0.4 mg under the tongue every 5 (five) minutes as needed for Chest pain.    nystatin (MYCOSTATIN) powder Apply topically 2 (two) times daily.    ondansetron (ZOFRAN-ODT) 4 MG TbDL Take 4 mg by mouth every 4 to 6 hours as needed.    senna (SENNA LAX) 8.6 mg tablet Take 2 tablets by mouth 2 (two) times daily.    tiotropium bromide (SPIRIVA RESPIMAT) 2.5 mcg/actuation inhaler Inhale 2 puffs into the lungs once daily. Controller    triamcinolone acetonide 0.1%  "(KENALOG) 0.1 % cream Apply topically 2 (two) times daily.     Family History       Problem Relation (Age of Onset)    Cancer Mother (55), Father    Cirrhosis Paternal Aunt, Paternal Uncle    Colon cancer Maternal Uncle    Esophageal cancer Father    Heart disease Maternal Uncle    Liver disease Paternal Aunt, Paternal Uncle    No Known Problems Brother, Brother, Sister, Maternal Aunt, Maternal Grandfather, Paternal Grandmother, Paternal Grandfather    Parkinsonism Maternal Grandmother    Tremor Maternal Grandmother          Tobacco Use    Smoking status: Never    Smokeless tobacco: Never   Substance and Sexual Activity    Alcohol use: Never    Drug use: Yes     Types: Marijuana     Comment: "medical marijuana gummies"    Sexual activity: Not on file     Review of Systems   Unable to perform ROS: Mental status change     Objective:     Vital Signs (Most Recent):  Temp: 98.3 °F (36.8 °C) (05/22/24 1208)  Pulse: 72 (05/22/24 1343)  Resp: 18 (05/22/24 1356)  BP: (!) 103/52 (05/22/24 1343)  SpO2: 100 % (05/22/24 1343) Vital Signs (24h Range):  Temp:  [98.3 °F (36.8 °C)] 98.3 °F (36.8 °C)  Pulse:  [70-74] 72  Resp:  [17-18] 18  SpO2:  [95 %-100 %] 100 %  BP: (103-139)/(52-71) 103/52     Weight: 96.6 kg (213 lb)  Body mass index is 35.45 kg/m².     Physical Exam  Vitals and nursing note reviewed.   Constitutional:       Appearance: She is well-developed.      Comments: Lethargic, arousable to voice and touch but then falls back asleep    HENT:      Head: Normocephalic and atraumatic.      Mouth/Throat:      Pharynx: No oropharyngeal exudate.   Cardiovascular:      Rate and Rhythm: Normal rate and regular rhythm.      Heart sounds: Normal heart sounds.   Pulmonary:      Effort: Pulmonary effort is normal.      Breath sounds: Normal breath sounds.      Comments: On chronic O2  Limited 2/2 body habitus and patient participation   Abdominal:      General: Bowel sounds are normal.      Palpations: Abdomen is soft.      " Tenderness: There is no abdominal tenderness.      Comments: Suprapubic cath in place   Musculoskeletal:         General: Tenderness (bilateral LE tenderness) present. Normal range of motion.      Cervical back: Normal range of motion and neck supple.      Right lower leg: Edema present.      Left lower leg: Edema present.   Lymphadenopathy:      Cervical: No cervical adenopathy.   Skin:     General: Skin is warm and dry.      Capillary Refill: Capillary refill takes less than 2 seconds.      Findings: Erythema present. No rash.   Neurological:      Comments: Arousable to voice and touch   Somnolent, mumbles answers   Oriented to name and place                              Significant Labs: All pertinent labs within the past 24 hours have been reviewed.  CBC:   Recent Labs   Lab 05/22/24  1357   WBC 5.47   HGB 10.6*   HCT 34.4*        CMP:   Recent Labs   Lab 05/22/24  1357      K 3.8      CO2 25   GLU 95   BUN 7*   CREATININE 0.8   CALCIUM 9.1   PROT 6.5   ALBUMIN 3.0*   BILITOT 0.3   ALKPHOS 143*   AST 17   ALT 14   ANIONGAP 9       Significant Imaging: I have reviewed all pertinent imaging results/findings within the past 24 hours.  Imaging Results              CT Head Without Contrast (Final result)  Result time 05/22/24 17:05:30      Final result by Maurice Ortega DO (05/22/24 17:05:30)                   Impression:      Allowing for distortion by scatter artifacts there is no definite acute intracranial findings specifically without evidence for definite acute intracranial hemorrhage or sulcal effacement to suggest large territory recent infarction    Clinical correlation and further evaluation with repeat imaging as warranted when patient better able to tolerate scanning.    See above for additional details.      Electronically signed by: Maurice Ortega DO  Date:    05/22/2024  Time:    17:05               Narrative:    EXAMINATION:  CT HEAD WITHOUT CONTRAST    CLINICAL HISTORY:  Head  trauma, minor (Age >= 65y);    TECHNIQUE:  Multiple sequential 5 mm axial images of the head without contrast.  Coronal and sagittal reformatted imaging from the axial acquisition.    COMPARISON:  02/09/2024    FINDINGS:  Study slightly distorted by motion and beam hardening artifacts despite repeating the imaging.  Please note subtle hemorrhage along the cerebral cortices or skull base peripherally could be obscured by the artifacts.  Within limits of the study there is no definite acute intracranial hemorrhage or sulcal effacement.  The ventricles are stable without hydrocephalus.  No midline shift or mass effect.  Hyperostosis the frontal calvarium    Visualized paranasal sinuses and mastoid air cells are clear.                                       X-Ray Shoulder Complete 2 View Right (Final result)  Result time 05/22/24 15:05:51      Final result by Simone Rivera MD (05/22/24 15:05:51)                   Impression:      1. No acute displaced fracture or dislocation of the right shoulder noting degenerative change.      Electronically signed by: Simone Rivera MD  Date:    05/22/2024  Time:    15:05               Narrative:    EXAMINATION:  XR SHOULDER COMPLETE 2 OR MORE VIEWS RIGHT    CLINICAL HISTORY:  Unspecified fall, initial encounter    TECHNIQUE:  Two or three views of the right shoulder were performed.    COMPARISON:  07/18/2023    FINDINGS:  Three views right shoulder.    The right humeral head maintains appropriate relationship with the glenoid noting glenohumeral degenerative change.  The acromioclavicular joint is intact noting degenerative change.  No acute displaced right rib fracture.  The right lung zones are grossly clear.

## 2024-05-22 NOTE — ASSESSMENT & PLAN NOTE
Patient's anemia is currently controlled. Has not received any PRBCs to date. Etiology likely d/t Iron deficiency  Current CBC reviewed-   Lab Results   Component Value Date    HGB 10.6 (L) 05/22/2024    HCT 34.4 (L) 05/22/2024     Monitor serial CBC and transfuse if patient becomes hemodynamically unstable, symptomatic or H/H drops below 7/21.

## 2024-05-22 NOTE — ASSESSMENT & PLAN NOTE
- acute on chronic lymphedema   - 80mg of IV lasix given in the ED   - bilateral LE US ordered   - monitor response and can continue to diureses as needed   - wound care consulted  - strict I/Os   - fluid restriction   - concern for cellulitis-- start linezolid q12 hrs as she has a hx of anaphalaxis to vancomycin   - ID consulted for approval

## 2024-05-22 NOTE — ED PROVIDER NOTES
Encounter Date: 5/22/2024       History     Chief Complaint   Patient presents with    Fall     Pt comes from home, she c/o multiple fall over the past few days. Pt has edema to legs and chronic shoulder and leg pain.     68F with PMH of COPD (on 4L), HFpEF, chronic BLE lymphedema, indwelling suprapubic catheter presents to the ED after having 6 falls yesterday. Pt is a poor historian, but reports falling onto her R shoulder multiple times yesterday a may have hit her head. Pt reports resultant R shoulder pain with movement of RUE. Additionally, pt reports having worsening BLE pain/swelling for the last few days despite compliance with Lasix 80mg TID.  However, she drinks 10-12 cans of 7up daily and does not follow any low sodium diet. Discussed with pt daughter about her condition, pt was discharged from SNF recently and has since deteriorated and has not been taking care of herself. Pt daughter unsure of her mother's medication compliance. Additionally, there have been issues securing home health services due to insurance issues.           Review of patient's allergies indicates:   Allergen Reactions    (d)-limonene flavor      Other reaction(s): difficult intubation  Other reaction(s): Difficulty breathing    Benadryl [diphenhydramine hcl] Shortness Of Breath    Fentanyl Itching, Nausea And Vomiting and Swelling             Imitrex [sumatriptan succinate] Shortness Of Breath    Oxycodone-acetaminophen Shortness Of Breath    Sulfamethoxazole-trimethoprim Anaphylaxis    Topiramate Shortness Of Breath    Vancomycin Anaphylaxis     Rash    Butorphanol tartrate     Evening primrose (oenothera biennis)     Latex     Pregabalin Other (See Comments)     tremors    Propoxyphene n-acetaminophen      Other reaction(s): Difficulty breathing    Sumatriptan     White petrolatum-zinc     Zinc acetate     Zinc oxide-white petrolatum      Other reaction(s): Difficulty breathing    Latex, natural rubber Itching and Rash     Phenytoin Rash and Other (See Comments)     Trouble breathing     Past Medical History:   Diagnosis Date    Anxiety     Arthritis     Bilateral lower extremity edema     severe chronic    Carotid artery occlusion     Cataract     CHF (congestive heart failure)     Coronary artery disease     subtotalled LAD with collateral    Depression     Fever blister     Hard of hearing     Hypokalemia 01/09/2023    Hyponatremia 02/04/2022    Hypothyroid     Iron deficiency anemia     Lumbar radiculopathy     with chronic pain    Ocular migraine     Other emphysema 05/22/2023    Renal disorder     Sleep apnea     cpap     Past Surgical History:   Procedure Laterality Date    ADENOIDECTOMY      BACK SURGERY      x 12    CARDIAC CATHETERIZATION  2016    subtotalled LAD with right to left collaterals    CATARACT EXTRACTION W/  INTRAOCULAR LENS IMPLANT Left     Dr Coleman     CYSTOSCOPIC LITHOLAPAXY N/A 6/27/2019    Procedure: CYSTOLITHOLAPAXY;  Surgeon: Shireen Mayo MD;  Location: [facility] OR 2ND FLR;  Service: Urology;  Laterality: N/A;    CYSTOSCOPIC LITHOLAPAXY N/A 9/3/2019    Procedure: CYSTOLITHOLAPAXY;  Surgeon: Shireen Mayo MD;  Location: [facility] OR 2ND FLR;  Service: Urology;  Laterality: N/A;    CYSTOSCOPY N/A 7/13/2021    Procedure: CYSTOSCOPY;  Surgeon: Shireen Mayo MD;  Location: [facility] OR 1ST FLR;  Service: Urology;  Laterality: N/A;    CYSTOSCOPY  11/16/2021    Procedure: CYSTOSCOPY;  Surgeon: Shireen Mayo MD;  Location: [facility] OR 1ST FLR;  Service: Urology;;    CYSTOSCOPY  7/19/2022    Procedure: CYSTOSCOPY;  Surgeon: Shireen Mayo MD;  Location: [facility] OR 1ST FLR;  Service: Urology;;    CYSTOSCOPY WITH INJECTION OF PERIURETHRAL BULKING AGENT  7/19/2022    Procedure: CYSTOSCOPY, WITH PERIURETHRAL BULKING AGENT INJECTION-MACROPLASTIQUE;  Surgeon: Shireen Mayo MD;  Location: [facility] OR 1ST FLR;  Service: Urology;;    CYSTOSCOPY WITH INJECTION OF PERIURETHRAL BULKING AGENT N/A 3/28/2023    Procedure:  CYSTOSCOPY, WITH PERIURETHRAL BULKING AGENT INJECTION;  Surgeon: Shireen Mayo MD;  Location: [facility] OR 1ST FLR;  Service: Urology;  Laterality: N/A;  [place]    CYSTOSCOPY WITH INJECTION OF PERIURETHRAL BULKING AGENT N/A 11/14/2023    Procedure: CYSTOSCOPY, WITH PERIURETHRAL BULKING AGENT INJECTION;  Surgeon: Shireen Mayo MD;  Location: [facility] OR 1ST FLR;  Service: Urology;  Laterality: N/A;    CYSTOSCOPY,WITH BOTULINUM TOXIN INJECTION N/A 12/13/2022    Procedure: CYSTOSCOPY,WITH BOTULINUM TOXIN INJECTION;  Surgeon: Shireen Mayo MD;  Location: [facility] OR 1ST FLR;  Service: Urology;  Laterality: N/A;  300 U    CYSTOSCOPY,WITH BOTULINUM TOXIN INJECTION N/A 3/28/2023    Procedure: CYSTOSCOPY,WITH BOTULINUM TOXIN INJECTION;  Surgeon: Shireen Mayo MD;  Location: [facility] OR 1ST FLR;  Service: Urology;  Laterality: N/A;  45 MIN.    300 UNITS    ESOPHAGOGASTRODUODENOSCOPY N/A 5/23/2018    Procedure: ESOPHAGOGASTRODUODENOSCOPY (EGD);  Surgeon: Prince Vance MD;  Location: [facility] ENDO (4TH FLR);  Service: Endoscopy;  Laterality: N/A;  r/s 'd per Dr. Vance due to family emergency- ER    ESOPHAGOGASTRODUODENOSCOPY N/A 6/23/2023    Procedure: EGD (ESOPHAGOGASTRODUODENOSCOPY);  Surgeon: Juaquin Barry MD;  Location: [facility] ENDO (2ND FLR);  Service: Endoscopy;  Laterality: N/A;  Refferal: PRINCE VANCE  Order  tele enc 5/18/23  dx: history of a Nissen fundoplication  Additional Scheduling Information:  On home oxygen 2nd floor for airway protection also with a pain pump elevated BMI close to 40   Prep Gastroparesis   ins    HYSTERECTOMY  1975    endometriosis    INJECTION OF BOTULINUM TOXIN TYPE A  7/13/2021    Procedure: INJECTION, BOTULINUM TOXIN, 200units;  Surgeon: Shireen Mayo MD;  Location: [facility] OR 1ST FLR;  Service: Urology;;    INJECTION OF BOTULINUM TOXIN TYPE A  11/16/2021    Procedure: INJECTION, BOTULINUM TOXIN, 200units;  Surgeon: Shireen Mayo MD;  Location: [facility] OR [address] [place] [address];  Service:  Urology;;    INJECTION OF BOTULINUM TOXIN TYPE A  7/19/2022    Procedure: INJECTION, BOTULINUM TOXIN, 300 units ;  Surgeon: Shireen Mayo MD;  [facility];  Service: Urology;;    INJECTION OF BOTULINUM TOXIN TYPE A N/A 11/14/2023    Procedure: INJECTION, BOTULINUM TOXIN, TYPE A;  Surgeon: Shireen Mayo MD;  [facility];  Service: Urology;  Laterality: N/A;    INSERTION, SUPRAPUBIC CATHETER N/A 12/13/2022    Procedure: INSERTION, SUPRAPUBIC CATHETER;  Surgeon: Shireen Mayo MD;  [facility];  Service: Urology;  Laterality: N/A;  exchange    INSERTION, SUPRAPUBIC CATHETER N/A 11/14/2023    Procedure: INSERTION, SUPRAPUBIC CATHETER;  Surgeon: Shireen Mayo MD;  Location: [facility] OR [address] [place] [address];  Service: Urology;  Laterality: N/A;  EXCHANGE of s/p tube    pain pump placement      SQ Dilaudid Pump managed by Dr. Hillman, Pain Management    REMOVAL OF BONE SPUR OF FOOT Bilateral 9/16/2022    Procedure: EXCISION ARTHRITIC BONE, BILATERAL FOOT;  Surgeon: Adam Mcguire [facility];  Location: [facility] OR;  Service: Podiatry;  Laterality: Bilateral;    REPLACEMENT OF BACLOFEN PUMP N/A 8/2/2023    Procedure: REPLACEMENT, BACLOFEN PUMP;  Surgeon: Smitha Condon MD;  Location: [facility] OR [address] [place] [address];  Service: Neurosurgery;  Laterality: N/A;  Anes: Gen  Blood: Type & Screen  Pos: Left Lat  Intrathecal Pump  Bed: Reg Reversed  Rad: C-arm  Pump: 40 cc, [employer]    REPLACEMENT OF CATHETER N/A 10/31/2019    Procedure: REPLACEMENT, CATHETER-SUPRAPUBIC;  Surgeon: Shireen Mayo MD;  Location: [facility] OR [address] [place] [address];  Service: Urology;  Laterality: N/A;    SPINAL CORD STIMULATOR REMOVAL      before Larissa    SPINE SURGERY  5-13-13    CERVICAL FUSION    TONSILLECTOMY       Family History   Problem Relation Name Age of Onset    Cancer Mother  55        breast    Cancer Father          esophagus,had laryngectomy    Esophageal cancer Father      Parkinsonism Maternal Grandmother      Tremor  "Maternal Grandmother      No Known Problems Brother      No Known Problems Brother      Heart disease Maternal Uncle klarissa     Colon cancer Maternal Uncle klarissa         Less than 60    No Known Problems Sister      No Known Problems Maternal Aunt      Cirrhosis Paternal Aunt          ETOH    Liver disease Paternal Aunt          ETOH    Liver disease Paternal Uncle          ETOH    Cirrhosis Paternal Uncle          ETOH    No Known Problems Maternal Grandfather      No Known Problems Paternal Grandmother      No Known Problems Paternal Grandfather      Melanoma Neg Hx      Amblyopia Neg Hx      Blindness Neg Hx      Cataracts Neg Hx      Diabetes Neg Hx      Glaucoma Neg Hx      Hypertension Neg Hx      Macular degeneration Neg Hx      Retinal detachment Neg Hx      Strabismus Neg Hx      Stroke Neg Hx      Thyroid disease Neg Hx      Celiac disease Neg Hx      Colon polyps Neg Hx      Cystic fibrosis Neg Hx      Crohn's disease Neg Hx      Inflammatory bowel disease Neg Hx      Liver cancer Neg Hx      Rectal cancer Neg Hx      Stomach cancer Neg Hx      Ulcerative colitis Neg Hx      Lymphoma Neg Hx       Social History     Tobacco Use    Smoking status: Never    Smokeless tobacco: Never   Substance Use Topics    Alcohol use: Never    Drug use: Yes     Types: Marijuana     Comment: "medical marijuana gummies"     Review of Systems   Constitutional:  Positive for activity change. Negative for fever.   Eyes:  Negative for visual disturbance.   Respiratory:  Negative for cough and shortness of breath.    Cardiovascular:  Positive for leg swelling. Negative for chest pain.   Gastrointestinal:  Negative for abdominal pain, nausea and vomiting.   Genitourinary:  Negative for hematuria and vaginal discharge.   Musculoskeletal:  Positive for gait problem.        R shoulder pain   Skin:  Positive for wound (BLE blistering).   Neurological:  Negative for syncope, light-headedness, numbness and headaches.       Physical " Exam     Initial Vitals [05/22/24 1208]   BP Pulse Resp Temp SpO2   136/66 70 18 98.3 °F (36.8 °C) 95 %      MAP       --         Physical Exam    Constitutional: She appears well-developed. No distress.   HENT:   Head: Normocephalic and atraumatic.   Eyes: Conjunctivae are normal.   Neck:   Normal range of motion.  Cardiovascular:  Normal rate and regular rhythm.           Pulmonary/Chest: Breath sounds normal. No respiratory distress. She has no wheezes. She has no rales. She exhibits no tenderness.   Abdominal: Abdomen is soft. She exhibits no distension. There is no abdominal tenderness.   Suprapubic catheter in place c/d/i   Musculoskeletal:      Cervical back: Normal range of motion.      Comments: R shoulder decreased ROM, pain with abduction past 45%  R biceps and triceps strength 4/5, limited by pain  RUE sensation intact  LUE strength/sensation intact  BLE erythematous, edematous, weeping. Chronic appearing lymphedema.     Neurological: She is alert. GCS score is 15. GCS eye subscore is 4. GCS verbal subscore is 5. GCS motor subscore is 6.         ED Course   Procedures  Labs Reviewed   CBC W/ AUTO DIFFERENTIAL - Abnormal; Notable for the following components:       Result Value    RBC 3.85 (*)     Hemoglobin 10.6 (*)     Hematocrit 34.4 (*)     MCHC 30.8 (*)     All other components within normal limits   COMPREHENSIVE METABOLIC PANEL - Abnormal; Notable for the following components:    BUN 7 (*)     Albumin 3.0 (*)     Alkaline Phosphatase 143 (*)     All other components within normal limits   TSH - Abnormal; Notable for the following components:    TSH 26.179 (*)     All other components within normal limits    Narrative:     add on tsh per Leigh Noel RN/Leoncio Villa MD order#   [id number] 05/22/2024 @ 16:08    T4, FREE - Abnormal; Notable for the following components:    Free T4 0.69 (*)     All other components within normal limits    Narrative:     add on tsh per Leigh Noel RN/Leoncio  TOM Villa MD order#   [id number] 05/22/2024 @ 16:08    ISTAT PROCEDURE - Abnormal; Notable for the following components:    POC PCO2 54.5 (*)     POC PO2 102 (*)     POC HCO3 31.5 (*)     POC BE 6 (*)     POC TCO2 33 (*)     All other components within normal limits   CULTURE, BLOOD   CULTURE, BLOOD   TROPONIN I   B-TYPE NATRIURETIC PEPTIDE   TSH   T4, FREE   TROPONIN I   LACTIC ACID, PLASMA   POCT GLUCOSE MONITORING CONTINUOUS        ECG Results              EKG 12-lead (Final result)        Collection Time Result Time QRS Duration OHS QTC Calculation    05/22/24 13:23:58 05/22/24 14:29:55 112 456                     Final result by Interface, Lab In [facility] (05/22/24 14:29:58)                   Narrative:    Test Reason : I50.30,    Vent. Rate : 067 BPM     Atrial Rate : 067 BPM     P-R Int : 174 ms          QRS Dur : 112 ms      QT Int : 432 ms       P-R-T Axes : 042 -15 030 degrees     QTc Int : 456 ms    Normal sinus rhythm  Intraventricular conduction delay  Abnormal ECG  When compared with ECG of 16-MAY-2024 21:50,  No significant change was found  Confirmed by Fay Powers MD ([id number]) on 5/22/2024 2:29:49 PM    Referred By: [email or web address]   SELF           Confirmed By:Fay Powers MD                                  Imaging Results              US Lower Extremity Veins Bilateral (Final result)  Result time 05/22/24 23:33:44      Final result by Ketan Larson MD (05/22/24 23:33:44)                   Impression:      No evidence of deep venous thrombosis in either lower extremity.      Electronically signed by: Ketan Larson MD  Date:    05/22/2024  Time:    23:33               Narrative:    EXAMINATION:  US LOWER EXTREMITY VEINS BILATERAL    CLINICAL HISTORY:  bilateral LE edema;    TECHNIQUE:  Duplex and color flow Doppler and dynamic compression was performed of the bilateral lower extremity veins was performed.    COMPARISON:  09/09/2023.    FINDINGS:  Right thigh veins: The common femoral, femoral,  popliteal, upper greater saphenous, and deep femoral veins are patent and free of thrombus. The veins are normally compressible and have normal phasic flow and augmentation response.    Right calf veins: The visualized calf veins are patent.    Left thigh veins: The common femoral, femoral, popliteal, upper greater saphenous, and deep femoral veins are patent and free of thrombus. The veins are normally compressible and have normal phasic flow and augmentation response.    Left calf veins: The visualized calf veins are patent.    Miscellaneous: Soft tissue edema.  No organized fluid collection.                                       X-Ray Chest AP Portable (Final result)  Result time 05/22/24 19:53:23      Final result by Simone Rivera MD (05/22/24 19:53:23)                   Impression:      1. Increased parenchymal attenuation projects over the left lower lung zone, may reflect atelectasis however developing consolidation not excluded.  Correlation is advised.      Electronically signed by: Simone Rivera MD  Date:    05/22/2024  Time:    19:53               Narrative:    EXAMINATION:  XR CHEST AP PORTABLE    CLINICAL HISTORY:  trauma;    TECHNIQUE:  Single frontal view of the chest was performed.    COMPARISON:  05/16/2024    FINDINGS:  The cardiomediastinal silhouette is prominent, magnified by technique noting calcification of the aorta..  There is obscuration of the right costophrenic angle, may reflect mild effusion..  The trachea is midline.  The lungs are symmetrically expanded bilaterally with coarse interstitial attenuation.  There is bandlike atelectasis projected over the left lower lung zone, developing consolidation not excluded..  There is no pneumothorax.  The osseous structures are remarkable for degenerative change.  Surgical change noted of the cervical spine..                                       CT Head Without Contrast (Final result)  Result time 05/22/24 17:05:30      Final result by  Maurice Ortega DO (05/22/24 17:05:30)                   Impression:      Allowing for distortion by scatter artifacts there is no definite acute intracranial findings specifically without evidence for definite acute intracranial hemorrhage or sulcal effacement to suggest large territory recent infarction    Clinical correlation and further evaluation with repeat imaging as warranted when patient better able to tolerate scanning.    See above for additional details.      Electronically signed by: Maurice Ortega DO  Date:    05/22/2024  Time:    17:05               Narrative:    EXAMINATION:  CT HEAD WITHOUT CONTRAST    CLINICAL HISTORY:  Head trauma, minor (Age >= 65y);    TECHNIQUE:  Multiple sequential 5 mm axial images of the head without contrast.  Coronal and sagittal reformatted imaging from the axial acquisition.    COMPARISON:  02/09/2024    FINDINGS:  Study slightly distorted by motion and beam hardening artifacts despite repeating the imaging.  Please note subtle hemorrhage along the cerebral cortices or skull base peripherally could be obscured by the artifacts.  Within limits of the study there is no definite acute intracranial hemorrhage or sulcal effacement.  The ventricles are stable without hydrocephalus.  No midline shift or mass effect.  Hyperostosis the frontal calvarium    Visualized paranasal sinuses and mastoid air cells are clear.                                       X-Ray Shoulder Complete 2 View Right (Final result)  Result time 05/22/24 15:05:51      Final result by Simone Rivera MD (05/22/24 15:05:51)                   Impression:      1. No acute displaced fracture or dislocation of the right shoulder noting degenerative change.      Electronically signed by: Simone Rivera MD  Date:    05/22/2024  Time:    15:05               Narrative:    EXAMINATION:  XR SHOULDER COMPLETE 2 OR MORE VIEWS RIGHT    CLINICAL HISTORY:  Unspecified fall, initial encounter    TECHNIQUE:  Two or  three views of the right shoulder were performed.    COMPARISON:  07/18/2023    FINDINGS:  Three views right shoulder.    The right humeral head maintains appropriate relationship with the glenoid noting glenohumeral degenerative change.  The acromioclavicular joint is intact noting degenerative change.  No acute displaced right rib fracture.  The right lung zones are grossly clear.                                       Medications   sodium chloride 0.9% flush 5 mL (has no administration in time range)   albuterol-ipratropium 2.5 mg-0.5 mg/3 mL nebulizer solution 3 mL (has no administration in time range)   melatonin tablet 6 mg (has no administration in time range)   ondansetron disintegrating tablet 8 mg (has no administration in time range)   prochlorperazine injection Soln 5 mg (has no administration in time range)   polyethylene glycol packet 17 g (has no administration in time range)   bisacodyL suppository 10 mg (has no administration in time range)   simethicone chewable tablet 80 mg (has no administration in time range)   aluminum-magnesium hydroxide-simethicone 200-200-20 mg/5 mL suspension 30 mL (has no administration in time range)   enoxaparin injection 40 mg (40 mg Subcutaneous Given 5/22/24 1722)   atorvastatin tablet 80 mg (80 mg Oral Given 5/23/24 0824)   hydrocortisone tablet 10 mg (10 mg Oral Given 5/22/24 2052)   hydrOXYzine HCL tablet 50 mg (has no administration in time range)   levothyroxine tablet 150 mcg (150 mcg Oral Given 5/23/24 0548)   pantoprazole EC tablet 40 mg (40 mg Oral Given 5/23/24 0824)   senna tablet 2 tablet (2 tablets Oral Given 5/23/24 0823)   tiotropium bromide 2.5 mcg/actuation inhaler 2 puff (2 puffs Inhalation Not Given 5/23/24 1003)   docusate sodium capsule 200 mg (200 mg Oral Given 5/22/24 2052)   oxybutynin 24 hr tablet 5 mg (5 mg Oral Given 5/23/24 0824)   glucose chewable tablet 16 g (has no administration in time range)   glucose chewable tablet 24 g (has no  administration in time range)   glucagon (human recombinant) injection 1 mg (has no administration in time range)   dextrose 10% bolus 125 mL 125 mL (has no administration in time range)   dextrose 10% bolus 250 mL 250 mL (has no administration in time range)   linezolid 600 mg/300 mL IVPB 600 mg (0 mg Intravenous Stopped 5/23/24 0650)   ketorolac injection 15 mg (15 mg Intravenous Given 5/23/24 0014)   fludrocortisone (FLORINEF) split tablet 50 mcg (50 mcg Oral Given 5/22/24 2202)   furosemide injection 20 mg (has no administration in time range)   furosemide injection 80 mg (80 mg Intravenous Given 5/22/24 1357)   morphine injection 2 mg (2 mg Intravenous Given 5/22/24 1356)     Medical Decision Making  68F with PMH of COPD (on 4L), HFpEF, chronic BLE lymphedema, indwelling suprapubic catheter presents to the ED after having 6 falls yesterday. Falls appear to be related to increased BLE swelling, that have caused pt to be unstable walking. Pt reports compliance with medication (lasix), but her daughter is not sure and reports that the pt does not follow her recommended diet and frequently drinks 10-12 soft drinks per day. Pt BLE are very edematous and erythematous c/w chronic lymphedema. Differential for BLE swelling and erythema includes acute on chronic lymphedema, CHF (pt LVEF 69%), cellulitis (bilateral, non purulent, non fluctuant), DVT (bilateral).   Regarding her falls, pt reports having R shoulder pain, and is unsure if she hit her head. Will obtain CT head, XR shoulder, and EKG (r/o cardiac cause, although pt denies any LOC, pre-syncope).  Overall clinical picture most c/w deconditioning and difficulty managing chronic medical conditions. Discussed with pt daughter about her condition, pt was discharged from SNF recently and has since deteriorated and has not been taking care of herself. Additionally, there have been issues securing home health services due to insurance issues.     Will admit to hospital  medicine for diuresis. No shoulder fracture/dislocation.     Amount and/or Complexity of Data Reviewed  Labs: ordered.     Details: CBC: grossly wnl  CMP: grossly wnl  Trop/BNP negative    Radiology: ordered and independent interpretation performed.     Details: CT head: No acute process  XR shoulder: no fracture/dislocation  ECG/medicine tests: ordered and independent interpretation performed.     Details: EKG: NSR, conduction delay, no STEMI  Discussion of management or test interpretation with external provider(s):  Internal Medicine, will admit    Risk  Prescription drug management.  Decision regarding hospitalization.              Attending Attestation:   Physician Attestation Statement for Resident:  As the supervising MD   Physician Attestation Statement: I have personally seen and examined this patient.   I agree with the above history.  -:  Falls, shoulder injury, fluid overload   As the supervising MD I agree with the above PE.     As the supervising MD I agree with the above treatment, course, plan, and disposition.                                           Clinical Impression:  Final diagnoses:  [W19.XXXA] Fall  [I50.30] (HFpEF) heart failure with preserved ejection fraction  [I89.0] Lymphedema (Primary)  [T14.8XXA] Skin excoriation          ED Disposition Condition    Observation Stable                Navdeep Merritt MD  Resident  05/22/24 1546       Benjamin Bal III, MD  05/23/24 1012

## 2024-05-22 NOTE — ED NOTES
Nurses Note -- 4 Eyes      5/22/2024   2:43 PM      Skin assessed during: Admit      [] No Altered Skin Integrity Present    []Prevention Measures Documented      [x] Yes- Altered Skin Integrity Present or Discovered   [x] LDA Added if Not in Epic (Describe Wound)   [] New Altered Skin Integrity was Present on Admit and Documented in LDA   [x] Wound Image Taken    Wound Care Consulted? No    Attending Nurse:  Leigh LEON RN    Second RN/Staff Member:   REJI Cary

## 2024-05-22 NOTE — ASSESSMENT & PLAN NOTE
Patient's COPD is controlled currently.  Patient is currently off COPD Pathway. Continue Supplemental oxygen and monitor respiratory status closely.   - on 4L at home

## 2024-05-22 NOTE — ASSESSMENT & PLAN NOTE
Body mass index is 35.45 kg/m². Morbid obesity complicates all aspects of disease management from diagnostic modalities to treatment. Weight loss encouraged and health benefits explained to patient.

## 2024-05-22 NOTE — HPI
"Tasha Hawley is a 68 y.o. female with a history of COPD (on 4L), HFpEF, chronic BLE lymphedema,adrenal insufficiency, diastolic HF, indwelling suprapubic catheter presents to the ED after having 6 falls yesterday. Patient is a poor historian and lethargic on exam. Awakens to verbal stimuli. Oriented to person and place, but unable to provide any history on my interview. She had received IV morphine a couple hours prior to this. Per ED provider: "but reports falling onto her R shoulder multiple times yesterday a may have hit her head. Pt reports resultant R shoulder pain with movement of RUE. Additionally, pt reports having worsening BLE pain/swelling for the last few days despite compliance with Lasix 80mg TID. However, she drinks 10-12 cans of 7up daily and does not follow any low sodium diet. Discussed with pt daughter about her condition, pt was discharged from SNF recently and has since deteriorated and has not been taking care of herself. Pt daughter unsure of her mother's medication compliance. Additionally, there have been issues securing home health services due to insurance issues."    Called daughter Lisa, who reports that sometimes her mom becomes very lethargic like this. States that she is unsure if her mom is taking medication appropriately. Patient lives in a  home with her  who is in his 80s and is unable to help care for her. Lisa states that her mom was doing very well after SNF last month, but she thinks when HH came by that it took her mom too long to get to the door so they left. She has significantly declined over the last month. Lisa is considering NH placement and would like to discuss further with SW/ARTURO.     ED: VSS, satting well on home 3L. CBC with chronic stable anemia. CMP unremarkable. BNP 27, troponin <0.006. R shoulder xray negative for acute fracture or dislocation. EKG in NSR. CT head pending.   "

## 2024-05-22 NOTE — ASSESSMENT & PLAN NOTE
- CXR pending   - BNP 27  - troponin <0.006  Results for orders placed during the hospital encounter of 05/08/24    Echo    Interpretation Summary    Left Ventricle: The left ventricle is normal in size. Normal wall thickness. There is normal systolic function. Biplane (2D) method of discs ejection fraction is 69%. Grade I diastolic dysfunction.    Right Ventricle: Normal right ventricular cavity size. Systolic function is normal. TAPSE is 3.66 cm.    Normal left atrial size. The left atrium volume index is 28.1 mL/m2.    Normal right atrial size.    The aortic valve is structurally normal. There is normal leaflet mobility.    The mitral valve is structurally normal. There is normal leaflet mobility.    The tricuspid valve is structurally normal. There is normal leaflet mobility.     [Follow-Up: _____] : a [unfilled] follow-up visit [FreeTextEntry1] : 12/16/2020 MRI Thoracic/ Lumbar wo - full report below / PACS\par Shows T7-T8 disc, and L5-S1 disc

## 2024-05-22 NOTE — NURSING
Nurses Note -- 4 Eyes      5/22/2024   5:40 PM      Skin assessed during: Admit      [] No Altered Skin Integrity Present    []Prevention Measures Documented      [x] Yes- Altered Skin Integrity Present or Discovered   [] LDA Added if Not in Epic (Describe Wound)   [x] New Altered Skin Integrity was Present on Admit and Documented in LDA   [x] Wound Image Taken    Wound Care Consulted? Yes (consult in the ED0    Attending Nurse:  Juan Ayala RN/Staff Member: Eliazar

## 2024-05-22 NOTE — H&P
"  Thierry viviana - Emergency Dept  [facility] Medicine  History & Physical    Patient Name: Tasha Hawley  MRN: [id number]  Patient Class: OP- Observation  Admission Date: 5/22/2024  Attending Physician: Leoncio Villa MD   Primary Care Provider: Mesfin Hodges II, MD         Patient information was obtained from patient, relative(s), and ER records.     Subjective:     Principal Problem:Encephalopathy, metabolic    Chief Complaint:   Chief Complaint   Patient presents with    Fall     Pt comes from home, she c/o multiple fall over the past few days. Pt has edema to legs and chronic shoulder and leg pain.        HPI: Tasha Hawley is a 68 y.o. female with a history of COPD (on 4L), HFpEF, chronic BLE lymphedema,adrenal insufficiency, diastolic HF, indwelling suprapubic catheter presents to the ED after having 6 falls yesterday. Patient is a poor historian and lethargic on exam. Awakens to verbal stimuli. Oriented to person and place, but unable to provide any history on my interview. She had received IV morphine a couple hours prior to this. Per ED provider: "but reports falling onto her R shoulder multiple times yesterday a may have hit her head. Pt reports resultant R shoulder pain with movement of RUE. Additionally, pt reports having worsening BLE pain/swelling for the last few days despite compliance with Lasix 80mg TID. However, she drinks 10-12 cans of 7up daily and does not follow any low sodium diet. Discussed with pt daughter about her condition, pt was discharged from SNF recently and has since deteriorated and has not been taking care of herself. Pt daughter unsure of her mother's medication compliance. Additionally, there have been issues securing home health services due to insurance issues."    Called daughter Lisa, who reports that sometimes her mom becomes very lethargic like this. States that she is unsure if her mom is taking medication appropriately. Patient lives in a  home with her  " who is in his 80s and is unable to help care for her. Lisa states that her mom was doing very well after SNF last month, but she thinks when HH came by that it took her mom too long to get to the door so they left. She has significantly declined over the last month. Lisa is considering NH placement and would like to discuss further with SW/ARTURO.     ED: VSS, satting well on home 3L. CBC with chronic stable anemia. CMP unremarkable. BNP 27, troponin <0.006. R shoulder xray negative for acute fracture or dislocation. EKG in NSR. CT head pending.     Past Medical History:   Diagnosis Date    Anxiety     Arthritis     Bilateral lower extremity edema     severe chronic    Carotid artery occlusion     Cataract     CHF (congestive heart failure)     Coronary artery disease     subtotalled LAD with collateral    Depression     Fever blister     Hard of hearing     Hypokalemia 01/09/2023    Hyponatremia 02/04/2022    Hypothyroid     Iron deficiency anemia     Lumbar radiculopathy     with chronic pain    Ocular migraine     Other emphysema 05/22/2023    Renal disorder     Sleep apnea     cpap       Past Surgical History:   Procedure Laterality Date    ADENOIDECTOMY      BACK SURGERY      x 12    CARDIAC CATHETERIZATION  2016    subtotalled LAD with right to left collaterals    CATARACT EXTRACTION W/  INTRAOCULAR LENS IMPLANT Left     Dr Coleman     CYSTOSCOPIC LITHOLAPAXY N/A 6/27/2019    Procedure: CYSTOLITHOLAPAXY;  Surgeon: Shireen Mayo MD;  Location: [facility] OR [address] [place] [address];  Service: Urology;  Laterality: N/A;    CYSTOSCOPIC LITHOLAPAXY N/A 9/3/2019    Procedure: CYSTOLITHOLAPAXY;  Surgeon: Shireen Mayo MD;  Location: [facility] OR [address] [place] [address];  Service: Urology;  Laterality: N/A;    CYSTOSCOPY N/A 7/13/2021    Procedure: CYSTOSCOPY;  Surgeon: Shireen Mayo MD;  Location: [facility] OR [address] [place] [address];  Service: Urology;  Laterality: N/A;    CYSTOSCOPY  11/16/2021    Procedure: CYSTOSCOPY;  Surgeon: Shireen Mayo MD;  Location: [facility] OR  1ST FLR;  Service: Urology;;    CYSTOSCOPY  7/19/2022    Procedure: CYSTOSCOPY;  Surgeon: Shireen Mayo MD;  [facility];  Service: Urology;;    CYSTOSCOPY WITH INJECTION OF PERIURETHRAL BULKING AGENT  7/19/2022    Procedure: CYSTOSCOPY, WITH PERIURETHRAL BULKING AGENT INJECTION-MACROPLASTIQUE;  Surgeon: Shireen Mayo MD;  [facility];  Service: Urology;;    CYSTOSCOPY WITH INJECTION OF PERIURETHRAL BULKING AGENT N/A 3/28/2023    Procedure: CYSTOSCOPY, WITH PERIURETHRAL BULKING AGENT INJECTION;  Surgeon: Shireen Mayo MD;  [facility];  Service: Urology;  Laterality: N/A;  [place]    CYSTOSCOPY WITH INJECTION OF PERIURETHRAL BULKING AGENT N/A 11/14/2023    Procedure: CYSTOSCOPY, WITH PERIURETHRAL BULKING AGENT INJECTION;  Surgeon: Shireen Mayo MD;  [facility];  Service: Urology;  Laterality: N/A;    CYSTOSCOPY,WITH BOTULINUM TOXIN INJECTION N/A 12/13/2022    Procedure: CYSTOSCOPY,WITH BOTULINUM TOXIN INJECTION;  Surgeon: Shireen Mayo MD;  [facility];  Service: Urology;  Laterality: N/A;  300 U    CYSTOSCOPY,WITH BOTULINUM TOXIN INJECTION N/A 3/28/2023    Procedure: CYSTOSCOPY,WITH BOTULINUM TOXIN INJECTION;  Surgeon: Shireen Mayo MD;  [facility];  Service: Urology;  Laterality: N/A;  45 MIN.    300 UNITS    ESOPHAGOGASTRODUODENOSCOPY N/A 5/23/2018    Procedure: ESOPHAGOGASTRODUODENOSCOPY (EGD);  Surgeon: Prince Vance MD;  Location: [facility] ENDO (4TH FLR);  Service: Endoscopy;  Laterality: N/A;  r/s 'd per Dr. Vance due to family emergency- ER    ESOPHAGOGASTRODUODENOSCOPY N/A 6/23/2023    Procedure: EGD (ESOPHAGOGASTRODUODENOSCOPY);  Surgeon: Juaquin Barry MD;  Location: [facility] ENDO (2ND FLR);  Service: Endoscopy;  Laterality: N/A;  Refferal: PRINCE VANCE  Order  tele enc 5/18/23  dx: history of a Nissen fundoplication  Additional Scheduling Information:  On home oxygen 2nd floor for  airway protection also with a pain pump elevated BMI close to 40   Prep Gastroparesis   ins    HYSTERECTOMY  1975    endometriosis    INJECTION OF BOTULINUM TOXIN TYPE A  7/13/2021    Procedure: INJECTION, BOTULINUM TOXIN, 200units;  Surgeon: Shireen Mayo MD;  Location: [facility] OR [address] [place] [address];  Service: Urology;;    INJECTION OF BOTULINUM TOXIN TYPE A  11/16/2021    Procedure: INJECTION, BOTULINUM TOXIN, 200units;  Surgeon: Shireen Mayo MD;  [facility];  Service: Urology;;    INJECTION OF BOTULINUM TOXIN TYPE A  7/19/2022    Procedure: INJECTION, BOTULINUM TOXIN, 300 units ;  Surgeon: Shireen Mayo MD;  [facility];  Service: Urology;;    INJECTION OF BOTULINUM TOXIN TYPE A N/A 11/14/2023    Procedure: INJECTION, BOTULINUM TOXIN, TYPE A;  Surgeon: Shireen Mayo MD;  Location: [facility] OR [address] [place] [address];  Service: Urology;  Laterality: N/A;    INSERTION, SUPRAPUBIC CATHETER N/A 12/13/2022    Procedure: INSERTION, SUPRAPUBIC CATHETER;  Surgeon: Shireen Mayo MD;  Location: [facility] OR [address] [place] [address];  Service: Urology;  Laterality: N/A;  exchange    INSERTION, SUPRAPUBIC CATHETER N/A 11/14/2023    Procedure: INSERTION, SUPRAPUBIC CATHETER;  Surgeon: Shireen Mayo MD;  Location: [facility] OR [address] [place] [address];  Service: Urology;  Laterality: N/A;  EXCHANGE of s/p tube    pain pump placement      SQ Dilaudid Pump managed by Dr. Hillman, Pain Management    REMOVAL OF BONE SPUR OF FOOT Bilateral 9/16/2022    Procedure: EXCISION ARTHRITIC BONE, BILATERAL FOOT;  Surgeon: Adam Mcguire DPM;  Location: [facility] OR;  Service: Podiatry;  Laterality: Bilateral;    REPLACEMENT OF BACLOFEN PUMP N/A 8/2/2023    Procedure: REPLACEMENT, BACLOFEN PUMP;  Surgeon: Smitha Condon MD;  Location: [facility] OR 2ND FLR;  Service: Neurosurgery;  Laterality: N/A;  Anes: Gen  Blood: Type & Screen  Pos: Left Lat  Intrathecal Pump  Bed: Reg Reversed  Rad: C-arm  Pump: 40 cc, Skywords    REPLACEMENT OF CATHETER N/A 10/31/2019     Procedure: REPLACEMENT, CATHETER-SUPRAPUBIC;  Surgeon: Shireen Mayo MD;  Location: [facility] OR [address] [place] [address];  Service: Urology;  Laterality: N/A;    SPINAL CORD STIMULATOR REMOVAL      before Larissa    SPINE SURGERY  5-13-13    CERVICAL FUSION    TONSILLECTOMY         Review of patient's allergies indicates:   Allergen Reactions    (d)-limonene flavor      Other reaction(s): difficult intubation  Other reaction(s): Difficulty breathing    Benadryl [diphenhydramine hcl] Shortness Of Breath    Fentanyl Itching, Nausea And Vomiting and Swelling             Imitrex [sumatriptan succinate] Shortness Of Breath    Oxycodone-acetaminophen Shortness Of Breath    Sulfamethoxazole-trimethoprim Anaphylaxis    Topiramate Shortness Of Breath    Vancomycin Anaphylaxis     Rash    Butorphanol tartrate     Evening primrose (oenothera biennis)     Latex     Pregabalin Other (See Comments)     tremors    Propoxyphene n-acetaminophen      Other reaction(s): Difficulty breathing    Sumatriptan     White petrolatum-zinc     Zinc acetate     Zinc oxide-white petrolatum      Other reaction(s): Difficulty breathing    Latex, natural rubber Itching and Rash    Phenytoin Rash and Other (See Comments)     Trouble breathing       No current facility-administered medications on file prior to encounter.     Current Outpatient Medications on File Prior to Encounter   Medication Sig    amitriptyline (ELAVIL) 25 MG tablet Take 1 tablet (25 mg total) by mouth every evening.    atorvastatin (LIPITOR) 80 MG tablet Take 1 tablet (80 mg total) by mouth once daily.    butalbital-acetaminophen-caffeine -40 mg (FIORICET, ESGIC) -40 mg per tablet Take 1 tablet by mouth daily as needed.    cyanocobalamin, vitamin B-12, 5,000 mcg Subl Place 1 tablet under the tongue once daily.    darifenacin (ENABLEX) 7.5 MG Tb24 Take 1 tablet (7.5 mg total) by mouth once daily.    docusate sodium (COLACE) 100 MG capsule Take 200 mg by mouth every evening.     DULoxetine (CYMBALTA) 60 MG capsule Take 1 capsule (60 mg total) by mouth 2 (two) times daily.    fludrocortisone (FLORINEF) 0.1 mg Tab Take a half-tablet (50 mcg) by mouth once daily    furosemide (LASIX) 40 MG tablet Take 1 tablet (40 mg total) by mouth once daily.    hydrocortisone (CORTEF) 10 MG Tab Take 1 tablet (10 mg total) by mouth every evening.    hydrOXYzine (ATARAX) 50 MG tablet Take 1 tablet (50 mg total) by mouth every 4 (four) hours as needed for Anxiety.    levothyroxine (SYNTHROID) 150 MCG tablet Take 1 tablet (150 mcg total) by mouth before breakfast.    pantoprazole (PROTONIX) 40 MG tablet Take 1 tablet (40 mg total) by mouth once daily.    potassium chloride (MICRO-K) 10 MEQ CpSR Take 2 capsules (20 mEq total) by mouth once daily.    promethazine (PHENERGAN) 25 MG tablet Take 1 tablet (25 mg total) by mouth every 6 (six) hours as needed for Nausea.    QUEtiapine (SEROQUEL) 100 MG Tab TAKE 1 TABLET (100 MG) BY MOUTH NIGHTLY    traMADoL (ULTRAM) 50 mg tablet Take 1 tablet (50 mg total) by mouth every 4 (four) hours as needed for Pain.    traZODone (DESYREL) 300 MG tablet Take 0.5 tablets (150 mg total) by mouth every evening.    aspirin (ECOTRIN) 81 MG EC tablet Take 1 tablet (81 mg total) by mouth once daily.    calcium-vitamin D3 (OS-JACKIE 500 + D3) 500 mg-5 mcg (200 unit) per tablet Take 2 tablets by mouth 2 (two) times daily.    [] fluconazole (DIFLUCAN) 200 MG Tab Take 1 tablet (200 mg total) by mouth once daily. for 7 days    HYDROcodone-acetaminophen (NORCO)  mg per tablet Take 1 tablet by mouth every 6 (six) hours as needed for Pain.    intrathecal pain pump compound Hydromorphone (7.5 mg/mL) infusion at 8.59 mg/day (0.3578 mg/hr) out of a total reservoir volume of 40 mL  Pump filled monthly    LIDOcaine (LIDODERM) 5 % Place 1 patch onto the skin once daily. Remove & Discard patch within 12 hours or as directed by MD    linaCLOtide (LINZESS) 72 mcg Cap capsule Take 1 capsule (72  "mcg total) by mouth before breakfast.    nitroGLYCERIN (NITROSTAT) 0.4 MG SL tablet Place 0.4 mg under the tongue every 5 (five) minutes as needed for Chest pain.    nystatin (MYCOSTATIN) powder Apply topically 2 (two) times daily.    ondansetron (ZOFRAN-ODT) 4 MG TbDL Take 4 mg by mouth every 4 to 6 hours as needed.    senna (SENNA LAX) 8.6 mg tablet Take 2 tablets by mouth 2 (two) times daily.    tiotropium bromide (SPIRIVA RESPIMAT) 2.5 mcg/actuation inhaler Inhale 2 puffs into the lungs once daily. Controller    triamcinolone acetonide 0.1% (KENALOG) 0.1 % cream Apply topically 2 (two) times daily.     Family History       Problem Relation (Age of Onset)    Cancer Mother (55), Father    Cirrhosis Paternal Aunt, Paternal Uncle    Colon cancer Maternal Uncle    Esophageal cancer Father    Heart disease Maternal Uncle    Liver disease Paternal Aunt, Paternal Uncle    No Known Problems Brother, Brother, Sister, Maternal Aunt, Maternal Grandfather, Paternal Grandmother, Paternal Grandfather    Parkinsonism Maternal Grandmother    Tremor Maternal Grandmother          Tobacco Use    Smoking status: Never    Smokeless tobacco: Never   Substance and Sexual Activity    Alcohol use: Never    Drug use: Yes     Types: Marijuana     Comment: "medical marijuana gummies"    Sexual activity: Not on file     Review of Systems   Unable to perform ROS: Mental status change     Objective:     Vital Signs (Most Recent):  Temp: 98.3 °F (36.8 °C) (05/22/24 1208)  Pulse: 72 (05/22/24 1343)  Resp: 18 (05/22/24 1356)  BP: (!) 103/52 (05/22/24 1343)  SpO2: 100 % (05/22/24 1343) Vital Signs (24h Range):  Temp:  [98.3 °F (36.8 °C)] 98.3 °F (36.8 °C)  Pulse:  [70-74] 72  Resp:  [17-18] 18  SpO2:  [95 %-100 %] 100 %  BP: (103-139)/(52-71) 103/52     Weight: 96.6 kg (213 lb)  Body mass index is 35.45 kg/m².     Physical Exam  Vitals and nursing note reviewed.   Constitutional:       Appearance: She is well-developed.      Comments: Lethargic, " arousable to voice and touch but then falls back asleep    HENT:      Head: Normocephalic and atraumatic.      Mouth/Throat:      Pharynx: No oropharyngeal exudate.   Cardiovascular:      Rate and Rhythm: Normal rate and regular rhythm.      Heart sounds: Normal heart sounds.   Pulmonary:      Effort: Pulmonary effort is normal.      Breath sounds: Normal breath sounds.      Comments: On chronic O2  Limited 2/2 body habitus and patient participation   Abdominal:      General: Bowel sounds are normal.      Palpations: Abdomen is soft.      Tenderness: There is no abdominal tenderness.      Comments: Suprapubic cath in place   Musculoskeletal:         General: Tenderness (bilateral LE tenderness) present. Normal range of motion.      Cervical back: Normal range of motion and neck supple.      Right lower leg: Edema present.      Left lower leg: Edema present.   Lymphadenopathy:      Cervical: No cervical adenopathy.   Skin:     General: Skin is warm and dry.      Capillary Refill: Capillary refill takes less than 2 seconds.      Findings: Erythema present. No rash.   Neurological:      Comments: Arousable to voice and touch   Somnolent, mumbles answers   Oriented to name and place                              Significant Labs: All pertinent labs within the past 24 hours have been reviewed.  CBC:   Recent Labs   Lab 05/22/24  1357   WBC 5.47   HGB 10.6*   HCT 34.4*        CMP:   Recent Labs   Lab 05/22/24  1357      K 3.8      CO2 25   GLU 95   BUN 7*   CREATININE 0.8   CALCIUM 9.1   PROT 6.5   ALBUMIN 3.0*   BILITOT 0.3   ALKPHOS 143*   AST 17   ALT 14   ANIONGAP 9       Significant Imaging: I have reviewed all pertinent imaging results/findings within the past 24 hours.  Imaging Results              CT Head Without Contrast (Final result)  Result time 05/22/24 17:05:30      Final result by Maurice Ortega DO (05/22/24 17:05:30)                   Impression:      Allowing for distortion by scatter  artifacts there is no definite acute intracranial findings specifically without evidence for definite acute intracranial hemorrhage or sulcal effacement to suggest large territory recent infarction    Clinical correlation and further evaluation with repeat imaging as warranted when patient better able to tolerate scanning.    See above for additional details.      Electronically signed by: Maurice Ortega DO  Date:    05/22/2024  Time:    17:05               Narrative:    EXAMINATION:  CT HEAD WITHOUT CONTRAST    CLINICAL HISTORY:  Head trauma, minor (Age >= 65y);    TECHNIQUE:  Multiple sequential 5 mm axial images of the head without contrast.  Coronal and sagittal reformatted imaging from the axial acquisition.    COMPARISON:  02/09/2024    FINDINGS:  Study slightly distorted by motion and beam hardening artifacts despite repeating the imaging.  Please note subtle hemorrhage along the cerebral cortices or skull base peripherally could be obscured by the artifacts.  Within limits of the study there is no definite acute intracranial hemorrhage or sulcal effacement.  The ventricles are stable without hydrocephalus.  No midline shift or mass effect.  Hyperostosis the frontal calvarium    Visualized paranasal sinuses and mastoid air cells are clear.                                       X-Ray Shoulder Complete 2 View Right (Final result)  Result time 05/22/24 15:05:51      Final result by Simone Rivera MD (05/22/24 15:05:51)                   Impression:      1. No acute displaced fracture or dislocation of the right shoulder noting degenerative change.      Electronically signed by: Simone Rivera MD  Date:    05/22/2024  Time:    15:05               Narrative:    EXAMINATION:  XR SHOULDER COMPLETE 2 OR MORE VIEWS RIGHT    CLINICAL HISTORY:  Unspecified fall, initial encounter    TECHNIQUE:  Two or three views of the right shoulder were performed.    COMPARISON:  07/18/2023    FINDINGS:  Three views right  shoulder.    The right humeral head maintains appropriate relationship with the glenoid noting glenohumeral degenerative change.  The acromioclavicular joint is intact noting degenerative change.  No acute displaced right rib fracture.  The right lung zones are grossly clear.                                    Assessment/Plan:     * Encephalopathy, metabolic  - Patient has acute metabolic encephalopathy unsure of etiology  - Labs with unremarkable   - UA, CXR, lactic and blood cultures pending  - CT head pending   - VBG pending  - bilateral LE with redness, start emperic linezolid for possible cellulitis   - Treat the underlying cause and monitor for improvement  - Monitor with q4 neuro checks  - Avoid narcotics and benzos that will exacerbate agitation, and use PRN anti-psychotics for controls of behavior for self harm.    - ddx: infection, uncontrolled hypothyroidism, polypharmacy, medication mismanagement, hypercapnia, worsening dementia, delirium  - Delirium precautions      Frequent falls  Physical deconditioning   Hemiplegia and hemiparesis following cerebral infarction affecting left non-dominant side  - recent stay in SNF, has not been able to take care of herself at home  - CM will discuss additional resources following d/c  - CT head pending  - fall precautions   - consider PT/OT consult but not likely a candidate as she was just discharged    Insomnia  - HOLD amitriptyline, trazodone and seroquel as may be contributing to encephalopathy     COPD (chronic obstructive pulmonary disease)  Patient's COPD is controlled currently.  Patient is currently off COPD Pathway. Continue Supplemental oxygen and monitor respiratory status closely.   - on 4L at home     Adrenal insufficiency  - continue fludrocortisone and hydrocortisone      Chronic diastolic heart failure  - CXR pending   - BNP 27  - troponin <0.006  Results for orders placed during the hospital encounter of 05/08/24    Echo    Interpretation Summary     Left Ventricle: The left ventricle is normal in size. Normal wall thickness. There is normal systolic function. Biplane (2D) method of discs ejection fraction is 69%. Grade I diastolic dysfunction.    Right Ventricle: Normal right ventricular cavity size. Systolic function is normal. TAPSE is 3.66 cm.    Normal left atrial size. The left atrium volume index is 28.1 mL/m2.    Normal right atrial size.    The aortic valve is structurally normal. There is normal leaflet mobility.    The mitral valve is structurally normal. There is normal leaflet mobility.    The tricuspid valve is structurally normal. There is normal leaflet mobility.      Pure hypercholesterolemia  - continue statin       Lymphedema of both lower extremities  - acute on chronic lymphedema   - 80mg of IV lasix given in the ED   - bilateral LE US ordered   - monitor response and can continue to diureses as needed   - wound care consulted  - strict I/Os   - fluid restriction   - concern for cellulitis-- start linezolid q12 hrs as she has a hx of anaphalaxis to vancomycin   - ID consulted for approval       Hypothyroidism (acquired)  - continue synthroid -- unsure if patient has been compliant though  - TSH 26.179, T4 0.69  - consult endocrine    Neurogenic bladder  Suprapubic catheter  - darifenacin not on formulary here, will start oxybutynin      Severe obesity (BMI 35.0-39.9) with comorbidity  Body mass index is 35.45 kg/m². Morbid obesity complicates all aspects of disease management from diagnostic modalities to treatment. Weight loss encouraged and health benefits explained to patient.         Iron deficiency anemia  Patient's anemia is currently controlled. Has not received any PRBCs to date. Etiology likely d/t Iron deficiency  Current CBC reviewed-   Lab Results   Component Value Date    HGB 10.6 (L) 05/22/2024    HCT 34.4 (L) 05/22/2024     Monitor serial CBC and transfuse if patient becomes hemodynamically unstable, symptomatic or H/H drops  below 7/21.    Sleep apnea  - CPAP QHS      Generalized anxiety disorder  - on amitriptyline, cymbalta  - holding in the setting of acute AMS of undetermined source        VTE Risk Mitigation (From admission, onward)           Ordered     enoxaparin injection 40 mg  Daily         05/22/24 1540     IP VTE HIGH RISK PATIENT  Once         05/22/24 1540                         On 05/22/2024, patient should be placed in hospital observation services under my care in collaboration with Dr. Leoncio Villa .           Heather Barrera PA-C  Department of Hospital Medicine  [facility] - Emergency Dept

## 2024-05-23 ENCOUNTER — EPISODE CHANGES (OUTPATIENT)
Dept: CARDIOLOGY | Facility: CLINIC | Age: 69
End: 2024-05-23

## 2024-05-23 ENCOUNTER — TELEPHONE (OUTPATIENT)
Dept: INTERNAL MEDICINE | Facility: CLINIC | Age: 69
End: 2024-05-23
Payer: MEDICARE

## 2024-05-23 PROBLEM — L03.115 BILATERAL CELLULITIS OF LOWER LEG: Status: ACTIVE | Noted: 2024-05-23

## 2024-05-23 PROBLEM — L03.116 BILATERAL CELLULITIS OF LOWER LEG: Status: ACTIVE | Noted: 2024-05-23

## 2024-05-23 LAB
ANION GAP SERPL CALC-SCNC: 7 MMOL/L (ref 8–16)
BUN SERPL-MCNC: 9 MG/DL (ref 8–23)
CALCIUM SERPL-MCNC: 8.5 MG/DL (ref 8.7–10.5)
CHLORIDE SERPL-SCNC: 104 MMOL/L (ref 95–110)
CO2 SERPL-SCNC: 28 MMOL/L (ref 23–29)
CREAT SERPL-MCNC: 0.8 MG/DL (ref 0.5–1.4)
ERYTHROCYTE [DISTWIDTH] IN BLOOD BY AUTOMATED COUNT: 13.6 % (ref 11.5–14.5)
EST. GFR  (NO RACE VARIABLE): >60 ML/MIN/1.73 M^2
GLUCOSE SERPL-MCNC: 95 MG/DL (ref 70–110)
HCT VFR BLD AUTO: 32.2 % (ref 37–48.5)
HGB BLD-MCNC: 9.9 G/DL (ref 12–16)
MAGNESIUM SERPL-MCNC: 1.7 MG/DL (ref 1.6–2.6)
MCH RBC QN AUTO: 27.4 PG (ref 27–31)
MCHC RBC AUTO-ENTMCNC: 30.7 G/DL (ref 32–36)
MCV RBC AUTO: 89 FL (ref 82–98)
PLATELET # BLD AUTO: 228 K/UL (ref 150–450)
PMV BLD AUTO: 10.2 FL (ref 9.2–12.9)
POCT GLUCOSE: 87 MG/DL (ref 70–110)
POTASSIUM SERPL-SCNC: 4 MMOL/L (ref 3.5–5.1)
RBC # BLD AUTO: 3.61 M/UL (ref 4–5.4)
SODIUM SERPL-SCNC: 139 MMOL/L (ref 136–145)
WBC # BLD AUTO: 4.63 K/UL (ref 3.9–12.7)

## 2024-05-23 PROCEDURE — 25000003 PHARM REV CODE 250: Mod: HCNC | Performed by: INTERNAL MEDICINE

## 2024-05-23 PROCEDURE — 21400001 HC TELEMETRY ROOM: Mod: HCNC

## 2024-05-23 PROCEDURE — 25000003 PHARM REV CODE 250: Mod: HCNC

## 2024-05-23 PROCEDURE — 80048 BASIC METABOLIC PNL TOTAL CA: CPT | Mod: HCNC

## 2024-05-23 PROCEDURE — 85027 COMPLETE CBC AUTOMATED: CPT | Mod: HCNC

## 2024-05-23 PROCEDURE — 63600175 PHARM REV CODE 636 W HCPCS: Mod: HCNC

## 2024-05-23 PROCEDURE — 83735 ASSAY OF MAGNESIUM: CPT | Mod: HCNC

## 2024-05-23 PROCEDURE — 99223 1ST HOSP IP/OBS HIGH 75: CPT | Mod: HCNC,,, | Performed by: INTERNAL MEDICINE

## 2024-05-23 PROCEDURE — 63600175 PHARM REV CODE 636 W HCPCS: Mod: HCNC | Performed by: INTERNAL MEDICINE

## 2024-05-23 PROCEDURE — 36415 COLL VENOUS BLD VENIPUNCTURE: CPT | Mod: HCNC

## 2024-05-23 RX ORDER — AMITRIPTYLINE HYDROCHLORIDE 25 MG/1
25 TABLET, FILM COATED ORAL NIGHTLY
Status: DISCONTINUED | OUTPATIENT
Start: 2024-05-23 | End: 2024-05-29 | Stop reason: HOSPADM

## 2024-05-23 RX ORDER — FUROSEMIDE 10 MG/ML
20 INJECTION INTRAMUSCULAR; INTRAVENOUS 2 TIMES DAILY
Status: DISCONTINUED | OUTPATIENT
Start: 2024-05-23 | End: 2024-05-24

## 2024-05-23 RX ORDER — DULOXETIN HYDROCHLORIDE 30 MG/1
60 CAPSULE, DELAYED RELEASE ORAL 2 TIMES DAILY
Status: DISCONTINUED | OUTPATIENT
Start: 2024-05-23 | End: 2024-05-29 | Stop reason: HOSPADM

## 2024-05-23 RX ORDER — QUETIAPINE FUMARATE 100 MG/1
100 TABLET, FILM COATED ORAL NIGHTLY
Status: DISCONTINUED | OUTPATIENT
Start: 2024-05-23 | End: 2024-05-24

## 2024-05-23 RX ADMIN — ATORVASTATIN CALCIUM 80 MG: 40 TABLET, FILM COATED ORAL at 08:05

## 2024-05-23 RX ADMIN — DULOXETINE HYDROCHLORIDE 60 MG: 30 CAPSULE, DELAYED RELEASE ORAL at 02:05

## 2024-05-23 RX ADMIN — PANTOPRAZOLE SODIUM 40 MG: 40 TABLET, DELAYED RELEASE ORAL at 08:05

## 2024-05-23 RX ADMIN — DULOXETINE HYDROCHLORIDE 60 MG: 30 CAPSULE, DELAYED RELEASE ORAL at 08:05

## 2024-05-23 RX ADMIN — ENOXAPARIN SODIUM 40 MG: 40 INJECTION SUBCUTANEOUS at 04:05

## 2024-05-23 RX ADMIN — AMITRIPTYLINE HYDROCHLORIDE 25 MG: 25 TABLET, FILM COATED ORAL at 08:05

## 2024-05-23 RX ADMIN — KETOROLAC TROMETHAMINE 15 MG: 15 INJECTION, SOLUTION INTRAMUSCULAR; INTRAVENOUS at 08:05

## 2024-05-23 RX ADMIN — SENNOSIDES 2 TABLET: 8.6 TABLET, FILM COATED ORAL at 08:05

## 2024-05-23 RX ADMIN — OXYBUTYNIN CHLORIDE 5 MG: 5 TABLET, EXTENDED RELEASE ORAL at 08:05

## 2024-05-23 RX ADMIN — QUETIAPINE FUMARATE 100 MG: 100 TABLET ORAL at 08:05

## 2024-05-23 RX ADMIN — FUROSEMIDE 20 MG: 10 INJECTION, SOLUTION INTRAMUSCULAR; INTRAVENOUS at 11:05

## 2024-05-23 RX ADMIN — KETOROLAC TROMETHAMINE 15 MG: 15 INJECTION, SOLUTION INTRAMUSCULAR; INTRAVENOUS at 11:05

## 2024-05-23 RX ADMIN — LINEZOLID 600 MG: 600 INJECTION, SOLUTION INTRAVENOUS at 05:05

## 2024-05-23 RX ADMIN — FUROSEMIDE 20 MG: 10 INJECTION, SOLUTION INTRAMUSCULAR; INTRAVENOUS at 04:05

## 2024-05-23 RX ADMIN — DAPTOMYCIN 775 MG: 350 INJECTION, POWDER, LYOPHILIZED, FOR SOLUTION INTRAVENOUS at 04:05

## 2024-05-23 RX ADMIN — LEVOTHYROXINE SODIUM 150 MCG: 150 TABLET ORAL at 05:05

## 2024-05-23 RX ADMIN — DOCUSATE SODIUM 200 MG: 50 CAPSULE, LIQUID FILLED ORAL at 08:05

## 2024-05-23 RX ADMIN — KETOROLAC TROMETHAMINE 15 MG: 15 INJECTION, SOLUTION INTRAMUSCULAR; INTRAVENOUS at 12:05

## 2024-05-23 RX ADMIN — FLUDROCORTISONE ACETATE 50 MCG: 0.1 TABLET ORAL at 11:05

## 2024-05-23 NOTE — SUBJECTIVE & OBJECTIVE
Interval HPI:   Overnight events:  New consult for abnormal thyroid studies.  Patient very tired this morning but able to answer some questions although reliability of her history is questionable.  Daughter at bedside assisted today.  No specific complaints at this time except being fatigued.      ROS:  As above    Labs Reviewed and Include:  Lab Results   Component Value Date    WBC 4.63 05/23/2024    HGB 9.9 (L) 05/23/2024    HCT 32.2 (L) 05/23/2024    MCV 89 05/23/2024     05/23/2024       Recent Labs   Lab 05/23/24  0536   GLU 95   CALCIUM 8.5*      K 4.0   CO2 28      BUN 9   CREATININE 0.8     Lab Results   Component Value Date    HGBA1C 5.3 05/03/2024       Nutritional status:   Body mass index is 35.44 kg/m².  Lab Results   Component Value Date    ALBUMIN 3.0 (L) 05/22/2024    ALBUMIN 3.6 05/16/2024    ALBUMIN 3.6 05/08/2024     Lab Results   Component Value Date    PREALBUMIN 13 (L) 02/02/2017       Estimated Creatinine Clearance: 78.4 mL/min (based on SCr of 0.8 mg/dL).      PHYSICAL EXAMINATION:  Vitals:    05/23/24 1501   BP:    Pulse: 76   Resp:    Temp:      Body mass index is 35.44 kg/m².     Physical Exam  Vitals reviewed.   Constitutional:       General: She is not in acute distress.  HENT:      Nose:      Comments: Nasal canula in place  Cardiovascular:      Rate and Rhythm: Normal rate.   Pulmonary:      Effort: Pulmonary effort is normal.   Skin:     Findings: Erythema present.      Comments: Bilateral lower extremity swelling from lymphedema with erythema noted.     Neurological:      Mental Status: She is alert.   Psychiatric:         Mood and Affect: Mood normal.         Behavior: Behavior normal.

## 2024-05-23 NOTE — ASSESSMENT & PLAN NOTE
69 yo woman with chronic lymphedema, admitted with bilateral cellulitis and skin breakdown. Course complicated by likely venous stasis dermatitis.    Recommendations  Elevation ([email or web address])  Daptomycin  Wound care

## 2024-05-23 NOTE — PLAN OF CARE
Problem: Adult Inpatient Plan of Care  Goal: Plan of Care Review  Outcome: Progressing  Goal: Patient-Specific Goal (Individualized)  Outcome: Progressing  Goal: Absence of Hospital-Acquired Illness or Injury  Outcome: Progressing  Goal: Optimal Comfort and Wellbeing  Outcome: Progressing  Goal: Readiness for Transition of Care  Outcome: Progressing     Problem: Infection  Goal: Absence of Infection Signs and Symptoms  Outcome: Progressing     Problem: Wound  Goal: Optimal Coping  Outcome: Progressing     Problem: Wound  Goal: Optimal Coping  Outcome: Progressing  Goal: Optimal Functional Ability  Outcome: Progressing  Goal: Absence of Infection Signs and Symptoms  Outcome: Progressing  Goal: Improved Oral Intake  Outcome: Progressing  Goal: Optimal Pain Control and Function  Outcome: Progressing  Goal: Skin Health and Integrity  Outcome: Progressing  Goal: Optimal Wound Healing  Outcome: Progressing

## 2024-05-23 NOTE — HPI
Tasha Hawley is a 68 y.o. female with a medical history of COPD (on 3-4L), HFpEF, BLE lymphedema, diastolic HF, indwelling suprapubic catheter, chronic pain on pain pump, hypothyroidism and concerns for prior adrenal insuffiencey.   She presented to the ED after having 6 falls prior to admission.  Recent stay at a SNF and since then she has worsened despite having her  at home to assist.  Reports are that her  is in his 80's and not much help though so there has been ongoing concern for medication non-compliance per the daughter.  Issues also recently with no having approval for home health due to insurance issues.  Has worsening discoloration of her legs concerning for cellulitis so antibiotics started.  Labs performed included thyroid function studies which were abnormal prompting consult to endocrinology.      Review of chart shows this patient has a long standing history of hypothyroidism with prior use of Levothyroxine and Liothyronine.  In prior years this was adjusted to LT4 alone.  More recently on chart review she appears to be on 150 mcg of Levothyroxine and she states her pill is blue in color which matches this description.  She does admit to compliance issues at times and her daughter agrees that is most likely the case.  Dosing at home currently is close to her weight predicted dose.      Lab Results   Component Value Date    TSH 26.179 (H) 05/22/2024    [id number] 5.2 05/17/2020    FREET4 0.69 (L) 05/22/2024     Previously seen in the outpatient endocrine clinic (6/2023) for evaluation of adrenal insufficiency concerns.  Had ACTH stimulation testing performed in the months prior to that visit with results as below.  Performed due to concerns for long standing opioid use causing AI.  Stimulation was noted to be in the setting of known hypoalbuminemia.  Due to cortisol being less than 18 the patient was placed on florinef and hydrocortisone and has been on this ever since.  Florinef  possibly added in setting of low blood pressure and concerns for orthostatic hypotension.     Latest Reference Range & Units 01/16/23 07:57 01/16/23 08:35 01/16/23 09:10   Cortisol ug/dL 8.60 16.40 17.20     The stimulation testing appears to have been done in the setting of hypotension during a hospital admission when patient was noted to be on a dilaudid pain pump and needing vasopressor support.    Hydrocortisone and florinef doses were decreased on last endocrine visit but ultimately appear to be an ongoing medication though compliance is not entirely known even speaking with the patient and daughter on day of consult.

## 2024-05-23 NOTE — ASSESSMENT & PLAN NOTE
Cellulitis     - acute on chronic lymphedema   - 80mg of IV lasix given in the ED   - bilateral LE US ordered - neg for DVT  - monitor response and can continue to diureses as needed    - 2L output with 80 IV lasix x1   - decrease to 20 IV BID and monitor response   - wound care consulted  - strict I/Os   - fluid restriction   - concern for cellulitis-- start linezolid q12 hrs as she has a hx of anaphalaxis to vancomycin   - ID consulted for approval

## 2024-05-23 NOTE — ASSESSMENT & PLAN NOTE
Presented with recurrent falls  Ongoing management of other related conditions per primary  Cellulitis diagnosed at this time

## 2024-05-23 NOTE — CONSULTS
[facility]  Endocrinology  Consult Note    Consult Requested by: Rebecca Sahni MD   Reason for admit: Bilateral cellulitis of lower leg    HISTORY OF PRESENT ILLNESS:  Tasha Hawley is a 68 y.o. female with a medical history of COPD (on 3-4L), HFpEF, BLE lymphedema, diastolic HF, indwelling suprapubic catheter, chronic pain on pain pump, hypothyroidism and concerns for prior adrenal insuffiencey.   She presented to the ED after having 6 falls prior to admission.  Recent stay at a SNF and since then she has worsened despite having her  at home to assist.  Reports are that her  is in his 80's and not much help though so there has been ongoing concern for medication non-compliance per the daughter.  Issues also recently with no having approval for home health due to insurance issues.  Has worsening discoloration of her legs concerning for cellulitis so antibiotics started.  Labs performed included thyroid function studies which were abnormal prompting consult to endocrinology.      Review of chart shows this patient has a long standing history of hypothyroidism with prior use of Levothyroxine and Liothyronine.  In prior years this was adjusted to LT4 alone.  More recently on chart review she appears to be on 150 mcg of Levothyroxine and she states her pill is blue in color which matches this description.  She does admit to compliance issues at times and her daughter agrees that is most likely the case.  Dosing at home currently is close to her weight predicted dose.      Lab Results   Component Value Date    TSH 26.179 (H) 05/22/2024    [id number] 5.2 05/17/2020    FREET4 0.69 (L) 05/22/2024     Previously seen in the outpatient endocrine clinic (6/2023) for evaluation of adrenal insufficiency concerns.  Had ACTH stimulation testing performed in the months prior to that visit with results as below.  Performed due to concerns for long standing opioid use causing AI.  Stimulation was noted  to be in the setting of known hypoalbuminemia.  Due to cortisol being less than 18 the patient was placed on florinef and hydrocortisone and has been on this ever since.  Florinef possibly added in setting of low blood pressure and concerns for orthostatic hypotension.     Latest Reference Range & Units 01/16/23 07:57 01/16/23 08:35 01/16/23 09:10   Cortisol ug/dL 8.60 16.40 17.20     The stimulation testing appears to have been done in the setting of hypotension during a hospital admission when patient was noted to be on a dilaudid pain pump and needing vasopressor support.    Hydrocortisone and florinef doses were decreased on last endocrine visit but ultimately appear to be an ongoing medication though compliance is not entirely known even speaking with the patient and daughter on day of consult.        Medications and/or Treatments Impacting Glycemic Control:  Immunotherapy:    Immunosuppressants       None          Steroids:   Hormones (From admission, onward)      Start     Stop Route Frequency Ordered    05/22/24 2100  fludrocortisone (FLORINEF) split tablet 50 mcg         -- Oral Daily 05/22/24 1817    05/22/24 1634  melatonin tablet 6 mg         -- Oral Nightly PRN 05/22/24 1540          Pressors:    Autonomic Drugs (From admission, onward)      None            Medications Prior to Admission   Medication Sig Dispense Refill Last Dose    amitriptyline (ELAVIL) 25 MG tablet Take 1 tablet (25 mg total) by mouth every evening. 90 tablet 0 5/21/2024    atorvastatin (LIPITOR) 80 MG tablet Take 1 tablet (80 mg total) by mouth once daily. 90 tablet 0 5/21/2024    butalbital-acetaminophen-caffeine -40 mg (FIORICET, ESGIC) -40 mg per tablet Take 1 tablet by mouth daily as needed for Headaches.   5/21/2024    calcium-vitamin D3 (OS-JACKIE 500 + D3) 500 mg-5 mcg (200 unit) per tablet Take 2 tablets by mouth 2 (two) times daily. 120 tablet 11     cyanocobalamin, vitamin B-12, 5,000 mcg Subl Place 1 tablet under  the tongue once daily.   5/21/2024    docusate sodium (COLACE) 100 MG capsule Take 200 mg by mouth every evening.   5/21/2024    DULoxetine (CYMBALTA) 60 MG capsule Take 1 capsule (60 mg total) by mouth 2 (two) times daily. 180 capsule 0 5/21/2024    fludrocortisone (FLORINEF) 0.1 mg Tab Take a half-tablet (50 mcg) by mouth once daily 15 tablet 5 5/21/2024    furosemide (LASIX) 40 MG tablet Take 1 tablet (40 mg total) by mouth once daily. 90 tablet 0 5/21/2024    HYDROcodone-acetaminophen (NORCO)  mg per tablet Take 1 tablet by mouth every 6 (six) hours as needed for Pain. 30 tablet 0     hydrocortisone (CORTEF) 10 MG Tab Take 1 tablet (10 mg total) by mouth every evening. 90 tablet 0 5/21/2024    intrathecal pain pump compound Hydromorphone (7.5 mg/mL) infusion at 8.59 mg/day (0.3578 mg/hr) out of a total reservoir volume of 40 mL  Pump filled monthly       levothyroxine (SYNTHROID) 150 MCG tablet Take 1 tablet (150 mcg total) by mouth before breakfast. 90 tablet 0 5/21/2024    pantoprazole (PROTONIX) 40 MG tablet Take 1 tablet (40 mg total) by mouth once daily. 90 tablet 0 5/21/2024    potassium chloride (MICRO-K) 10 MEQ CpSR Take 2 capsules (20 mEq total) by mouth once daily. 180 capsule 0 5/21/2024    QUEtiapine (SEROQUEL) 100 MG Tab TAKE 1 TABLET (100 MG) BY MOUTH NIGHTLY 180 tablet 0 5/21/2024    senna (SENNA LAX) 8.6 mg tablet Take 2 tablets by mouth 2 (two) times daily.       traZODone (DESYREL) 300 MG tablet Take 0.5 tablets (150 mg total) by mouth every evening. 90 tablet 0 5/21/2024    [DISCONTINUED] promethazine (PHENERGAN) 25 MG tablet Take 1 tablet (25 mg total) by mouth every 6 (six) hours as needed for Nausea. 20 tablet 0 5/21/2024    [DISCONTINUED] traMADoL (ULTRAM) 50 mg tablet Take 1 tablet (50 mg total) by mouth every 4 (four) hours as needed for Pain. 30 tablet 0 5/21/2024    aspirin (ECOTRIN) 81 MG EC tablet Take 1 tablet (81 mg total) by mouth once daily. (Patient not taking: Reported on  5/22/2024) 90 tablet 3 Not Taking    darifenacin (ENABLEX) 7.5 MG Tb24 Take 1 tablet (7.5 mg total) by mouth once daily. (Patient not taking: Reported on 5/22/2024) 30 tablet 0 Not Taking    hydrOXYzine (ATARAX) 50 MG tablet Take 1 tablet (50 mg total) by mouth every 4 (four) hours as needed for Anxiety. (Patient not taking: Reported on 5/22/2024) 30 tablet 0 Not Taking    nitroGLYCERIN (NITROSTAT) 0.4 MG SL tablet Place 0.4 mg under the tongue every 5 (five) minutes as needed for Chest pain. (Patient not taking: Reported on 5/22/2024)   Not Taking    tiotropium bromide (SPIRIVA RESPIMAT) 2.5 mcg/actuation inhaler Inhale 2 puffs into the lungs once daily. Controller (Patient not taking: Reported on 5/22/2024) 4 g 1 Not Taking       Current Facility-Administered Medications   Medication Dose Route Frequency Provider Last Rate Last Admin    albuterol-ipratropium 2.5 mg-0.5 mg/3 mL nebulizer solution 3 mL  3 mL Nebulization Q4H PRN Heather Barrera PA-C        aluminum-magnesium hydroxide-simethicone 200-200-20 mg/5 mL suspension 30 mL  30 mL Oral QID PRN Heather Barrera PA-C        amitriptyline tablet 25 mg  25 mg Oral QHS Marjan Cervantes PA-C        atorvastatin tablet 80 mg  80 mg Oral Daily Prerna Biswas PA-C   80 mg at 05/23/24 0824    bisacodyL suppository 10 mg  10 mg Rectal Daily PRN Heather Barrera PA-C        DAPTOmycin (CUBICIN) 775 mg in sodium chloride 0.9% SolP 50 mL IVPB  8 mg/kg Intravenous Q24H Andrey Galarza MD        dextrose 10% bolus 125 mL 125 mL  12.5 g Intravenous PRN Heather Barrera PA-C        dextrose 10% bolus 250 mL 250 mL  25 g Intravenous PRN Heather Barrera PA-C        docusate sodium capsule 200 mg  200 mg Oral QHS Prerna Biswas PA-C   200 mg at 05/22/24 2052    DULoxetine DR capsule 60 mg  60 mg Oral BID Marjan Cervantes PA-C   60 mg at 05/23/24 1455    enoxaparin injection 40 mg  40 mg Subcutaneous Daily Heather Barrera PA-C   40 mg at 05/22/24 1722     fludrocortisone (FLORINEF) split tablet 50 mcg  50 mcg Oral Daily Leoncio Villa MD   50 mcg at 05/23/24 1131    furosemide injection 20 mg  20 mg Intravenous BID Marjan Cervantes PA-C   20 mg at 05/23/24 1130    glucagon (human recombinant) injection 1 mg  1 mg Intramuscular PRN Heather Barrera PA-C        glucose chewable tablet 16 g  16 g Oral PRN Heather Barrera PA-C        glucose chewable tablet 24 g  24 g Oral PRN Heather Barrera PA-C        ketorolac injection 15 mg  15 mg Intravenous Q6H PRN Heather Barrera PA-C   15 mg at 05/23/24 1130    levothyroxine tablet 150 mcg  150 mcg Oral Before breakfast Prerna Biswas PA-C   150 mcg at 05/23/24 0548    melatonin tablet 6 mg  6 mg Oral Nightly PRN Heather Barrera PA-C        ondansetron disintegrating tablet 8 mg  8 mg Oral Q8H PRN Heather Barrera PA-C        oxybutynin 24 hr tablet 5 mg  5 mg Oral Daily Prerna Biswas PA-C   5 mg at 05/23/24 0824    pantoprazole EC tablet 40 mg  40 mg Oral Daily Prerna Biswas PA-C   40 mg at 05/23/24 0824    polyethylene glycol packet 17 g  17 g Oral BID PRN Heather Barrera PA-C        prochlorperazine injection Soln 5 mg  5 mg Intravenous Q6H PRN Heather Barrera PA-C        QUEtiapine tablet 100 mg  100 mg Oral QHS Marjan Cervantes PA-C        senna tablet 2 tablet  2 tablet Oral BID Prerna Biswas PA-C   2 tablet at 05/23/24 0823    simethicone chewable tablet 80 mg  1 tablet Oral QID PRN Heather Barrera PA-C        sodium chloride 0.9% flush 5 mL  5 mL Intravenous PRN Heather Barrera PA-C        tiotropium bromide 2.5 mcg/actuation inhaler 2 puff  2 puff Inhalation Daily Prerna Biswas PA-C           Interval HPI:   Overnight events:  New consult for abnormal thyroid studies.  Patient very tired this morning but able to answer some questions although reliability of her history is questionable.  Daughter at bedside assisted today.  No specific complaints at this time except being fatigued.       ROS:  As above    Labs Reviewed and Include:  Lab Results   Component Value Date    WBC 4.63 05/23/2024    HGB 9.9 (L) 05/23/2024    HCT 32.2 (L) 05/23/2024    MCV 89 05/23/2024     05/23/2024       Recent Labs   Lab 05/23/24  0536   GLU 95   CALCIUM 8.5*      K 4.0   CO2 28      BUN 9   CREATININE 0.8     Lab Results   Component Value Date    HGBA1C 5.3 05/03/2024       Nutritional status:   Body mass index is 35.44 kg/m².  Lab Results   Component Value Date    ALBUMIN 3.0 (L) 05/22/2024    ALBUMIN 3.6 05/16/2024    ALBUMIN 3.6 05/08/2024     Lab Results   Component Value Date    PREALBUMIN 13 (L) 02/02/2017       Estimated Creatinine Clearance: 78.4 mL/min (based on SCr of 0.8 mg/dL).      PHYSICAL EXAMINATION:  Vitals:    05/23/24 1501   BP:    Pulse: 76   Resp:    Temp:      Body mass index is 35.44 kg/m².     Physical Exam  Vitals reviewed.   Constitutional:       General: She is not in acute distress.  HENT:      Nose:      Comments: Nasal canula in place  Cardiovascular:      Rate and Rhythm: Normal rate.   Pulmonary:      Effort: Pulmonary effort is normal.   Skin:     Findings: Erythema present.      Comments: Bilateral lower extremity swelling from lymphedema with erythema noted.     Neurological:      Mental Status: She is alert.   Psychiatric:         Mood and Affect: Mood normal.         Behavior: Behavior normal.        .     ASSESSMENT and PLAN:    ID  * Bilateral cellulitis of lower leg  Antibiotics per primary and ID      Endocrine  Adrenal insufficiency  Prior concerns for abnormal ACTH stimulation test in 2023.  Most likely degree of cortisol elevation with testing was normal in the setting of hypoalbuminemia at the time.  Has been on once nightly hydrocortisone dosing but compliance is unknown.  Also on Florinef but may have been added in setting of orthostatic hypotension concerns.     Had prior 8 am cortisol levels outpatient but unknown if these were in the setting of HC  use of if doses were held prior to labs.    Plan:  - Holding hydrocortisone dosing tonight  - ACTH stimulation testing will be arranged in the morning (5/24)   - If results not concerning then HC will need to be removed from her home medication list    - No immediate concerns for primary adrenal insuffiencey which would be the only endocrine related indication to use Florinef.  Will have more evidence of AI rule out vs confirmation tomorrow.  If concerns for orthostatic hypotension then Florinef can be helpful vs Midodrine.  Defer to primary team and outside providers on it's use.  Chronic pain medication use with pain pump could be playing a role in lower BP's      Hypothyroidism (acquired)  Long standing hypothyroidism.  Issues over the years with medication compliance.  The degree of TSH elevation and mildly decreased Free T4 level matches more of a pattern of intermittent use of Levothyroxine.  Patient confirms compliance issues and chart review shows refills have been filled 78% of the time.      No concerns based on labs and symptoms to suggest myxedema coma or other related concerns.  No IV levothyroxine is needed in this instance and home dosing should be continued.      Plan:  - Continue Levothyroxine 150 mcg once daily (close to weight based dosing)  - At discharge compliance is encouraged with use of a pill box daily  - Will need TSH labs set up following her endocrine appointment in early July 2024      Other  Lymphedema of both lower extremities  Chronic with now concerns for cellulitis.  Management per primary and now ID following for antibiotics.      Frequent falls  Presented with recurrent falls  Ongoing management of other related conditions per primary  Cellulitis diagnosed at this time          Betito Andrade, DO  Endocrinology

## 2024-05-23 NOTE — HOSPITAL COURSE
Tasha Hawley was placed in  observation for acute encephalopathy. Suspected due to polypharmacy, however infectious encephalopathy possible. Patient found to have significant BLE swelling with skin breakdown and erythema concerning for cellulitis. Started linezolid, ID consulted and recommending switch to IV dapto - slow but steady improvement in swelling and erythema. Wound care recommending both legs to be left open to air vs non-stick dressings. Fluid removal with IV diuresis, but stopped due to hypotension. Will continue home dose of PO lasix. Suspect uncontrolled hypothyroidism due to poor med compliance, resuming home dose of synthroid 150. CM assisting with dc planning - likely needing [facility] from [facility]. Will attempt  services in the interim. Patient determined to be medically ready for discharge with plan for transition to oral antibiotics on 5/27. Patient appealing her discharge - verdict pending. Will continue IV dapto for now per ID latest recommendations. Appeal denied and patient determined med ready for dc home with family. Continue Doxycycline x10 days. Follow up with PCP and endocrinology. On exam prior to discharge patient doing well and medically stable. Discharge plan discussed and patient/ daughter expressed understanding. All questions answered.

## 2024-05-23 NOTE — CONSULTS
"[facility] - Observation 11H  Infectious Disease  Consult Note    Patient Name: Tasha Hawley  MRN: [id number]  Admission Date: 5/22/2024  Hospital Length of Stay: 0 days  Attending Physician: Rebecca Sahni MD  Primary Care Provider: Mesfin Hodges II, MD     Isolation Status: No active isolations    Patient information was obtained from patient, relative(s), past medical records, and ER records.      Inpatient consult to Infectious Diseases  Consult performed by: Andrey Galarza MD  Consult ordered by: Leoncio Villa MD        Assessment/Plan:     ID  * Bilateral cellulitis of lower leg  67 yo woman with chronic lymphedema, admitted with bilateral cellulitis and skin breakdown. Course complicated by likely venous stasis dermatitis, needing better edema control.    Recommendations  Elevation ([email or web address])  Daptomycin  Wound care    Thank you for your consult. I will follow-up with patient. Please contact us if you have any additional questions.    Andrey Galarza MD  Infectious Disease  [facility] - Observation 11H    Subjective:     Principal Problem: Bilateral cellulitis of lower leg    HPI: Ms. Hawley is a pleasant 68 y.o. woman admitted with cellulitis of her legs due to chronic edema. She has COPD (on 4L), HFpEF, chronic BLE lymphedema,adrenal insufficiency, diastolic HF, indwelling suprapubic catheter presents to the ED after having 6 falls. She tells me that her pump was turned doen and that her legs started to swell. When she looked down, she saw that they were very red. Her falls were due, in part, to her lymphedema. She was evaluated in the ED and started on Zyvox for cellulitis. ID is consulted for "On linezolid d/t hx of anaphylaxis from vancomycin."    Lives at home with . Daughter at bedside provided additional history.      Past Medical History:   Diagnosis Date    Anxiety     Arthritis     Bilateral lower extremity edema     severe chronic    Carotid artery occlusion  "    Cataract     CHF (congestive heart failure)     Coronary artery disease     subtotalled LAD with collateral    Depression     Fever blister     Hard of hearing     Hypokalemia 01/09/2023    Hyponatremia 02/04/2022    Hypothyroid     Iron deficiency anemia     Lumbar radiculopathy     with chronic pain    Ocular migraine     Other emphysema 05/22/2023    Renal disorder     Sleep apnea     cpap       Past Surgical History:   Procedure Laterality Date    ADENOIDECTOMY      BACK SURGERY      x 12    CARDIAC CATHETERIZATION  2016    subtotalled LAD with right to left collaterals    CATARACT EXTRACTION W/  INTRAOCULAR LENS IMPLANT Left     Dr Coleman     CYSTOSCOPIC LITHOLAPAXY N/A 6/27/2019    Procedure: CYSTOLITHOLAPAXY;  Surgeon: Shireen Mayo MD;  Location: [facility] OR 2ND FLR;  Service: Urology;  Laterality: N/A;    CYSTOSCOPIC LITHOLAPAXY N/A 9/3/2019    Procedure: CYSTOLITHOLAPAXY;  Surgeon: Shireen Mayo MD;  Location: [facility] OR 2ND FLR;  Service: Urology;  Laterality: N/A;    CYSTOSCOPY N/A 7/13/2021    Procedure: CYSTOSCOPY;  Surgeon: Shireen Mayo MD;  [facility];  Service: Urology;  Laterality: N/A;    CYSTOSCOPY  11/16/2021    Procedure: CYSTOSCOPY;  Surgeon: Shireen Mayo MD;  [facility];  Service: Urology;;    CYSTOSCOPY  7/19/2022    Procedure: CYSTOSCOPY;  Surgeon: Shireen Mayo MD;  Location: [facility] OR [address] [place] [address];  Service: Urology;;    CYSTOSCOPY WITH INJECTION OF PERIURETHRAL BULKING AGENT  7/19/2022    Procedure: CYSTOSCOPY, WITH PERIURETHRAL BULKING AGENT INJECTION-MACROPLASTIQUE;  Surgeon: Shireen Mayo MD;  [facility];  Service: Urology;;    CYSTOSCOPY WITH INJECTION OF PERIURETHRAL BULKING AGENT N/A 3/28/2023    Procedure: CYSTOSCOPY, WITH PERIURETHRAL BULKING AGENT INJECTION;  Surgeon: Shireen Mayo MD;  [facility];  Service: Urology;  Laterality: N/A;  [place]    CYSTOSCOPY WITH INJECTION OF PERIURETHRAL BULKING  AGENT N/A 11/14/2023    Procedure: CYSTOSCOPY, WITH PERIURETHRAL BULKING AGENT INJECTION;  Surgeon: Shireen Mayo MD;  Location: [facility] OR 1ST FLR;  Service: Urology;  Laterality: N/A;    CYSTOSCOPY,WITH BOTULINUM TOXIN INJECTION N/A 12/13/2022    Procedure: CYSTOSCOPY,WITH BOTULINUM TOXIN INJECTION;  Surgeon: Shireen Mayo MD;  Location: [facility] OR 1ST FLR;  Service: Urology;  Laterality: N/A;  300 U    CYSTOSCOPY,WITH BOTULINUM TOXIN INJECTION N/A 3/28/2023    Procedure: CYSTOSCOPY,WITH BOTULINUM TOXIN INJECTION;  Surgeon: Shireen Mayo MD;  Location: [facility] OR 1ST FLR;  Service: Urology;  Laterality: N/A;  45 MIN.    300 UNITS    ESOPHAGOGASTRODUODENOSCOPY N/A 5/23/2018    Procedure: ESOPHAGOGASTRODUODENOSCOPY (EGD);  Surgeon: Prince Vance MD;  Location: [facility] ENDO (4TH FLR);  Service: Endoscopy;  Laterality: N/A;  r/s 'd per Dr. Vance due to family emergency- ER    ESOPHAGOGASTRODUODENOSCOPY N/A 6/23/2023    Procedure: EGD (ESOPHAGOGASTRODUODENOSCOPY);  Surgeon: Juaquin Barry MD;  Location: [facility] (2ND FLR);  Service: Endoscopy;  Laterality: N/A;  Refferal: PRINCE VANCE  Order  tele enc 5/18/23  dx: history of a Nissen fundoplication  Additional Scheduling Information:  On home oxygen 2nd floor for airway protection also with a pain pump elevated BMI close to 40   Prep Gastroparesis   ins    HYSTERECTOMY  1975    endometriosis    INJECTION OF BOTULINUM TOXIN TYPE A  7/13/2021    Procedure: INJECTION, BOTULINUM TOXIN, 200units;  Surgeon: Shireen Mayo MD;  Location: [facility] OR 1ST FLR;  Service: Urology;;    INJECTION OF BOTULINUM TOXIN TYPE A  11/16/2021    Procedure: INJECTION, BOTULINUM TOXIN, 200units;  Surgeon: Shireen Mayo MD;  [facility];  Service: Urology;;    INJECTION OF BOTULINUM TOXIN TYPE A  7/19/2022    Procedure: INJECTION, BOTULINUM TOXIN, 300 units ;  Surgeon: Shireen Mayo MD;  Location: [facility] OR [address] [place] [address];  Service: Urology;;    INJECTION OF  BOTULINUM TOXIN TYPE A N/A 11/14/2023    Procedure: INJECTION, BOTULINUM TOXIN, TYPE A;  Surgeon: Shireen Mayo MD;  Location: [facility] OR 1ST FLR;  Service: Urology;  Laterality: N/A;    INSERTION, SUPRAPUBIC CATHETER N/A 12/13/2022    Procedure: INSERTION, SUPRAPUBIC CATHETER;  Surgeon: Shireen Mayo MD;  Location: [facility] OR 1ST FLR;  Service: Urology;  Laterality: N/A;  exchange    INSERTION, SUPRAPUBIC CATHETER N/A 11/14/2023    Procedure: INSERTION, SUPRAPUBIC CATHETER;  Surgeon: Shireen Mayo MD;  [facility];  Service: Urology;  Laterality: N/A;  EXCHANGE of s/p tube    pain pump placement      SQ Dilaudid Pump managed by Dr. Hillman, Pain Management    REMOVAL OF BONE SPUR OF FOOT Bilateral 9/16/2022    Procedure: EXCISION ARTHRITIC BONE, BILATERAL FOOT;  Surgeon: Adam Mcguire DPM;  Location: [facility] OR;  Service: Podiatry;  Laterality: Bilateral;    REPLACEMENT OF BACLOFEN PUMP N/A 8/2/2023    Procedure: REPLACEMENT, BACLOFEN PUMP;  Surgeon: Smitha Condon MD;  Location: [facility] OR 2ND FLR;  Service: Neurosurgery;  Laterality: N/A;  Anes: Gen  Blood: Type & Screen  Pos: Left Lat  Intrathecal Pump  Bed: Reg Reversed  Rad: C-arm  Pump: 40 cc, [employer]tronics    REPLACEMENT OF CATHETER N/A 10/31/2019    Procedure: REPLACEMENT, CATHETER-SUPRAPUBIC;  Surgeon: Shireen Mayo MD;  [facility];  Service: Urology;  Laterality: N/A;    SPINAL CORD STIMULATOR REMOVAL      before Larissa    SPINE SURGERY  5-13-13    CERVICAL FUSION    TONSILLECTOMY         Review of patient's allergies indicates:   Allergen Reactions    (d)-limonene flavor      Other reaction(s): difficult intubation  Other reaction(s): Difficulty breathing    Benadryl [diphenhydramine hcl] Shortness Of Breath    Fentanyl Itching, Nausea And Vomiting and Swelling             Imitrex [sumatriptan succinate] Shortness Of Breath    Oxycodone-acetaminophen Shortness Of Breath    Sulfamethoxazole-trimethoprim  Anaphylaxis    Topiramate Shortness Of Breath    Vancomycin Anaphylaxis     Rash    Butorphanol tartrate     Evening primrose (oenothera biennis)     Latex     Pregabalin Other (See Comments)     tremors    Propoxyphene n-acetaminophen      Other reaction(s): Difficulty breathing    Sumatriptan     White petrolatum-zinc     Zinc acetate     Zinc oxide-white petrolatum      Other reaction(s): Difficulty breathing    Latex, natural rubber Itching and Rash    Phenytoin Rash and Other (See Comments)     Trouble breathing       Medications:  Medications Prior to Admission   Medication Sig    amitriptyline (ELAVIL) 25 MG tablet Take 1 tablet (25 mg total) by mouth every evening.    atorvastatin (LIPITOR) 80 MG tablet Take 1 tablet (80 mg total) by mouth once daily.    butalbital-acetaminophen-caffeine -40 mg (FIORICET, ESGIC) -40 mg per tablet Take 1 tablet by mouth daily as needed for Headaches.    calcium-vitamin D3 (OS-JACKIE 500 + D3) 500 mg-5 mcg (200 unit) per tablet Take 2 tablets by mouth 2 (two) times daily.    cyanocobalamin, vitamin B-12, 5,000 mcg Subl Place 1 tablet under the tongue once daily.    docusate sodium (COLACE) 100 MG capsule Take 200 mg by mouth every evening.    DULoxetine (CYMBALTA) 60 MG capsule Take 1 capsule (60 mg total) by mouth 2 (two) times daily.    fludrocortisone (FLORINEF) 0.1 mg Tab Take a half-tablet (50 mcg) by mouth once daily    furosemide (LASIX) 40 MG tablet Take 1 tablet (40 mg total) by mouth once daily.    HYDROcodone-acetaminophen (NORCO)  mg per tablet Take 1 tablet by mouth every 6 (six) hours as needed for Pain.    hydrocortisone (CORTEF) 10 MG Tab Take 1 tablet (10 mg total) by mouth every evening.    intrathecal pain pump compound Hydromorphone (7.5 mg/mL) infusion at 8.59 mg/day (0.3578 mg/hr) out of a total reservoir volume of 40 mL  Pump filled monthly    levothyroxine (SYNTHROID) 150 MCG tablet Take 1 tablet (150 mcg total) by mouth before breakfast.     pantoprazole (PROTONIX) 40 MG tablet Take 1 tablet (40 mg total) by mouth once daily.    potassium chloride (MICRO-K) 10 MEQ CpSR Take 2 capsules (20 mEq total) by mouth once daily.    QUEtiapine (SEROQUEL) 100 MG Tab TAKE 1 TABLET (100 MG) BY MOUTH NIGHTLY    senna (SENNA LAX) 8.6 mg tablet Take 2 tablets by mouth 2 (two) times daily.    traZODone (DESYREL) 300 MG tablet Take 0.5 tablets (150 mg total) by mouth every evening.    [DISCONTINUED] promethazine (PHENERGAN) 25 MG tablet Take 1 tablet (25 mg total) by mouth every 6 (six) hours as needed for Nausea.    [DISCONTINUED] traMADoL (ULTRAM) 50 mg tablet Take 1 tablet (50 mg total) by mouth every 4 (four) hours as needed for Pain.    aspirin (ECOTRIN) 81 MG EC tablet Take 1 tablet (81 mg total) by mouth once daily. (Patient not taking: Reported on 5/22/2024)    darifenacin (ENABLEX) 7.5 MG Tb24 Take 1 tablet (7.5 mg total) by mouth once daily. (Patient not taking: Reported on 5/22/2024)    hydrOXYzine (ATARAX) 50 MG tablet Take 1 tablet (50 mg total) by mouth every 4 (four) hours as needed for Anxiety. (Patient not taking: Reported on 5/22/2024)    nitroGLYCERIN (NITROSTAT) 0.4 MG SL tablet Place 0.4 mg under the tongue every 5 (five) minutes as needed for Chest pain. (Patient not taking: Reported on 5/22/2024)    tiotropium bromide (SPIRIVA RESPIMAT) 2.5 mcg/actuation inhaler Inhale 2 puffs into the lungs once daily. Controller (Patient not taking: Reported on 5/22/2024)     Antibiotics (From admission, onward)      Start     Stop Route Frequency Ordered    05/23/24 1245  DAPTOmycin (CUBICIN) 775 mg in sodium chloride 0.9% SolP 50 mL IVPB         -- IV Every 24 hours (non-standard times) 05/23/24 1132          Antifungals (From admission, onward)      None          Antivirals (From admission, onward)      None             Immunization History   Administered Date(s) Administered    COVID-19, MRNA, LN-S, PF (Pfizer) (Gray Cap) 04/05/2022    COVID-19, MRNA,  "LN-S, PF (Pfizer) (Purple Cap) 04/02/2021, 04/23/2021    COVID-19, mRNA, LNP-S, PF (Moderna 2023)Ages 12+ 01/05/2024    COVID-19, mRNA, LNP-S, bivalent booster, PF (PFIZER OMICRON) 01/06/2023    Influenza 10/22/2008, 12/23/2010, 10/13/2011    Influenza (FLUAD) - Quadrivalent - Adjuvanted - PF *Preferred* (65+) 01/06/2023, 09/22/2023, 01/05/2024    Influenza - Quadrivalent 11/14/2014, 09/25/2015    Influenza - Quadrivalent - PF *Preferred* (6 months and older) 10/22/2008, 12/23/2010, 10/13/2011, 11/30/2017, 09/21/2018, 09/21/2018, 11/07/2019, 09/29/2020    PPD Test 02/10/2022, 01/17/2023, 02/10/2024    Pneumococcal Conjugate - 13 Valent 05/29/2014, 05/29/2014, 12/29/2017    Pneumococcal Conjugate - 20 Valent 09/22/2023    Pneumococcal Polysaccharide - 23 Valent 05/17/2018, 05/17/2018    Tdap 12/17/2015, 03/02/2017       Family History       Problem Relation (Age of Onset)    Cancer Mother (55), Father    Cirrhosis Paternal Aunt, Paternal Uncle    Colon cancer Maternal Uncle    Esophageal cancer Father    Heart disease Maternal Uncle    Liver disease Paternal Aunt, Paternal Uncle    No Known Problems Brother, Brother, Sister, Maternal Aunt, Maternal Grandfather, Paternal Grandmother, Paternal Grandfather    Parkinsonism Maternal Grandmother    Tremor Maternal Grandmother          Social History     Socioeconomic History    Marital status:    Tobacco Use    Smoking status: Never    Smokeless tobacco: Never   Substance and Sexual Activity    Alcohol use: Never    Drug use: Yes     Types: Marijuana     Comment: "medical marijuana gummies"     Social Determinants of Health     Financial Resource Strain: Low Risk  (3/19/2024)    Overall Financial Resource Strain (CARDIA)     Difficulty of Paying Living Expenses: Not very hard   Food Insecurity: No Food Insecurity (3/19/2024)    Hunger Vital Sign     Worried About Running Out of Food in the Last Year: Never true     Ran Out of Food in the Last Year: Never true "   Transportation Needs: No Transportation Needs (3/19/2024)    PRAPARE - Transportation     Lack of Transportation (Medical): No     Lack of Transportation (Non-Medical): No   Physical Activity: Inactive (3/19/2024)    Exercise Vital Sign     Days of Exercise per Week: 0 days     Minutes of Exercise per Session: 0 min   Stress: Stress Concern Present (3/19/2024)    Citizen of [place] of Occupational Health - Occupational Stress Questionnaire     Feeling of Stress : To some extent   Housing Stability: Low Risk  (3/19/2024)    Housing Stability Vital Sign     Unable to Pay for Housing in the Last Year: No     Number of Places Lived in the Last Year: 1     Unstable Housing in the Last Year: No     Review of Systems   Cardiovascular:  Positive for leg swelling.   Musculoskeletal:  Positive for arthralgias and myalgias.   Skin:  Positive for color change.   All other systems reviewed and are negative.    Objective:     Vital Signs (Most Recent):  Temp: 97.9 °F (36.6 °C) (05/23/24 0734)  Pulse: (!) 57 (05/23/24 1110)  Resp: 18 (05/23/24 0734)  BP: (!) 104/58 (05/23/24 0734)  SpO2: 95 % (05/23/24 0734) Vital Signs (24h Range):  Temp:  [97.8 °F (36.6 °C)-99.1 °F (37.3 °C)] 97.9 °F (36.6 °C)  Pulse:  [57-88] 57  Resp:  [16-18] 18  SpO2:  [95 %-100 %] 95 %  BP: ()/(50-73) 104/58     Weight: 96.6 kg (212 lb 15.4 oz)  Body mass index is 35.44 kg/m².    Estimated Creatinine Clearance: 78.4 mL/min (based on SCr of 0.8 mg/dL).     Physical Exam  Vitals and nursing note reviewed.   Constitutional:       Appearance: Normal appearance.   HENT:      Head: Normocephalic.   Cardiovascular:      Rate and Rhythm: Bradycardia present.   Pulmonary:      Breath sounds: No rhonchi or rales.   Musculoskeletal:         General: Swelling and tenderness present.      Right lower leg: Edema present.      Left lower leg: Edema present.      Comments: Erythema and warmth; skin ulcerations posteriorly   Neurological:      Mental Status: She is  alert.   Psychiatric:         Mood and Affect: Mood normal.         Behavior: Behavior normal.         Thought Content: Thought content normal.          Significant Labs: BMP:   Recent Labs   Lab 05/23/24  0536   GLU 95      K 4.0      CO2 28   BUN 9   CREATININE 0.8   CALCIUM 8.5*   MG 1.7     CBC:   Recent Labs   Lab 05/22/24  1357 05/22/24  1720 05/23/24  0536   WBC 5.47  --  4.63   HGB 10.6*  --  9.9*   HCT 34.4* 36 32.2*     --  228     Microbiology Results (last 7 days)       Procedure Component Value Units Date/Time    Blood culture [[id number]]     Order Status: Sent Specimen: Blood     Blood culture [[id number]]     Order Status: Sent Specimen: Blood             Significant Imaging: I have reviewed all pertinent imaging results/findings within the past 24 hours.

## 2024-05-23 NOTE — SUBJECTIVE & OBJECTIVE
Interval History: NAEON. AFVSS. Patient alert and oriented this morning, daughter at bedside reporting she is baseline. BLE with significant swelling and erythema, skin breakdown. IV diuresis, IV abx, wound care.     Review of Systems   Constitutional:  Negative for chills and fever.   Respiratory:  Negative for chest tightness and shortness of breath.    Cardiovascular:  Positive for leg swelling. Negative for chest pain.   Gastrointestinal:  Negative for abdominal pain and nausea.   Musculoskeletal:         +falls   Skin:  Positive for color change and wound.        BLE pain   Neurological:  Positive for weakness. Negative for dizziness.   Psychiatric/Behavioral:  Positive for sleep disturbance.      Objective:     Vital Signs (Most Recent):  Temp: 97.9 °F (36.6 °C) (05/23/24 0734)  Pulse: (!) 57 (05/23/24 1110)  Resp: 18 (05/23/24 0734)  BP: (!) 104/58 (05/23/24 0734)  SpO2: 95 % (05/23/24 0734) Vital Signs (24h Range):  Temp:  [97.8 °F (36.6 °C)-99.1 °F (37.3 °C)] 97.9 °F (36.6 °C)  Pulse:  [57-88] 57  Resp:  [16-18] 18  SpO2:  [95 %-100 %] 95 %  BP: ()/(50-73) 104/58     Weight: 96.6 kg (212 lb 15.4 oz)  Body mass index is 35.44 kg/m².    Intake/Output Summary (Last 24 hours) at 5/23/2024 1117  Last data filed at 5/22/2024 1841  Gross per 24 hour   Intake 120 ml   Output 2000 ml   Net -1880 ml         Physical Exam  Vitals and nursing note reviewed.   Constitutional:       Appearance: She is well-developed. She is obese. She is ill-appearing.   HENT:      Head: Normocephalic and atraumatic.   Eyes:      Extraocular Movements: Extraocular movements intact.      Pupils: Pupils are equal, round, and reactive to light.   Cardiovascular:      Rate and Rhythm: Normal rate and regular rhythm.      Pulses: Normal pulses.   Pulmonary:      Effort: Pulmonary effort is normal.      Breath sounds: Normal breath sounds.      Comments: 4L NC (baseline)  Abdominal:      Palpations: Abdomen is soft.      Tenderness:  There is no abdominal tenderness.   Musculoskeletal:         General: Swelling and tenderness present. Normal range of motion.      Right lower leg: Edema present.      Left lower leg: Edema present.      Comments: Significant 3+ pitting BLE edema. Poor skin integrity, circumferential. Skin breakdown bilateral posterior calves, weeping serosanguinous fluid. Erythema extending feet to upper skins.    Skin:     General: Skin is warm.      Findings: Erythema and lesion present.   Neurological:      Mental Status: She is alert and oriented to person, place, and time.   Psychiatric:         Behavior: Behavior normal.             Significant Labs: All pertinent labs within the past 24 hours have been reviewed.  CBC:   Recent Labs   Lab 05/22/24  1357 05/22/24  1720 05/23/24  0536   WBC 5.47  --  4.63   HGB 10.6*  --  9.9*   HCT 34.4* 36 32.2*     --  228     CMP:   Recent Labs   Lab 05/22/24  1357 05/23/24  0536    139   K 3.8 4.0    104   CO2 25 28   GLU 95 95   BUN 7* 9   CREATININE 0.8 0.8   CALCIUM 9.1 8.5*   PROT 6.5  --    ALBUMIN 3.0*  --    BILITOT 0.3  --    ALKPHOS 143*  --    AST 17  --    ALT 14  --    ANIONGAP 9 7*     Cardiac Markers:   Recent Labs   Lab 05/22/24  1357   BNP 27     Troponin:   Recent Labs   Lab 05/22/24  1357   TROPONINI <0.006     TSH:   Recent Labs   Lab 05/22/24  1357   TSH 26.179*     Urine Studies:   Recent Labs   Lab 05/22/24  1934   COLORU Colorless*   APPEARANCEUA Clear   PHUR 6.0   SPECGRAV 1.010   PROTEINUA Negative   GLUCUA Negative   KETONESU Negative   BILIRUBINUA Negative   OCCULTUA 1+*   NITRITE Negative   LEUKOCYTESUR Trace*   RBCUA 10*   WBCUA 2   BACTERIA Rare       Significant Imaging: I have reviewed all pertinent imaging results/findings within the past 24 hours.

## 2024-05-23 NOTE — ASSESSMENT & PLAN NOTE
Long standing hypothyroidism.  Issues over the years with medication compliance.  The degree of TSH elevation and mildly decreased Free T4 level matches more of a pattern of intermittent use of Levothyroxine.  Patient confirms compliance issues and chart review shows refills have been filled 78% of the time.      No concerns based on labs and symptoms to suggest myxedema coma or other related concerns.  No IV levothyroxine is needed in this instance and home dosing should be continued.      Plan:  - Continue Levothyroxine 150 mcg once daily (close to weight based dosing)  - At discharge compliance is encouraged with use of a pill box daily  - Will need TSH labs set up following her endocrine appointment in early July 2024

## 2024-05-23 NOTE — PROGRESS NOTES
I have reviewed and concur with Dr. Betito Andrade's history, physical, assessment, and plan.  I have personally interviewed and examined the patient.      Norma Pearson MD

## 2024-05-23 NOTE — TELEPHONE ENCOUNTER
----- Message from Angely Glover sent at 5/21/2024 12:33 PM CDT -----  Contact: Bernarda with [facility] [phone]  [email or web address]     Patient is calling for Medical Advice regarding: Bernarda with [facility] [phone] is calling  pt has large swelling and large wounds on back of her calf, discoloration  from the knee down, she has fluid leaking from her chin and many blisters on her legs. Bernarda is very concern she needs wound care .please call her back and advise    How long has patient had these symptoms:    Pharmacy name and phone#:    Would like response via [employer]:  callback    Comments:

## 2024-05-23 NOTE — PLAN OF CARE
Inpatient Upgrade Note    Tasha Hawley has warranted treatment spanning two or more midnights of hospital level care for the management of heart failure and BLE Lymphedema and Cellulitis . She continues to require IV antibiotics, IV diuresis, daily labs, supplemental oxygen, monitoring of vital signs, and medication adjustments. Her condition is also complicated by the following comorbidities: Coronary Artery Disease, Heart failure, and Arthritis, Anxiety, Depression, Anemia, Sleep Apnea .

## 2024-05-23 NOTE — TELEPHONE ENCOUNTER
Spoke to chasity with home care, she states Mrs. Hawley has 3 to 4 wounds covering the backs of her legs, her medications were also all messed up as in patient didn't know what she was or wasn't taking. I advised Chasity that we are well aware of how noncompliant she is and that I just got her medications situated again. Patient was advised to go to ED per home care nurse. Patient currently admitted [facility]

## 2024-05-23 NOTE — SUBJECTIVE & OBJECTIVE
Past Medical History:   Diagnosis Date    Anxiety     Arthritis     Bilateral lower extremity edema     severe chronic    Carotid artery occlusion     Cataract     CHF (congestive heart failure)     Coronary artery disease     subtotalled LAD with collateral    Depression     Fever blister     Hard of hearing     Hypokalemia 01/09/2023    Hyponatremia 02/04/2022    Hypothyroid     Iron deficiency anemia     Lumbar radiculopathy     with chronic pain    Ocular migraine     Other emphysema 05/22/2023    Renal disorder     Sleep apnea     cpap       Past Surgical History:   Procedure Laterality Date    ADENOIDECTOMY      BACK SURGERY      x 12    CARDIAC CATHETERIZATION  2016    subtotalled LAD with right to left collaterals    CATARACT EXTRACTION W/  INTRAOCULAR LENS IMPLANT Left     Dr Coleman     CYSTOSCOPIC LITHOLAPAXY N/A 6/27/2019    Procedure: CYSTOLITHOLAPAXY;  Surgeon: Shireen Mayo MD;  Location: [facility] OR 2ND FLR;  Service: Urology;  Laterality: N/A;    CYSTOSCOPIC LITHOLAPAXY N/A 9/3/2019    Procedure: CYSTOLITHOLAPAXY;  Surgeon: Shireen Mayo MD;  Location: [facility] OR 2ND FLR;  Service: Urology;  Laterality: N/A;    CYSTOSCOPY N/A 7/13/2021    Procedure: CYSTOSCOPY;  Surgeon: Shireen Mayo MD;  Location: [facility] OR 1ST FLR;  Service: Urology;  Laterality: N/A;    CYSTOSCOPY  11/16/2021    Procedure: CYSTOSCOPY;  Surgeon: Shireen Mayo MD;  Location: [facility] OR 1ST FLR;  Service: Urology;;    CYSTOSCOPY  7/19/2022    Procedure: CYSTOSCOPY;  Surgeon: Shireen Mayo MD;  Location: [facility] OR 1ST FLR;  Service: Urology;;    CYSTOSCOPY WITH INJECTION OF PERIURETHRAL BULKING AGENT  7/19/2022    Procedure: CYSTOSCOPY, WITH PERIURETHRAL BULKING AGENT INJECTION-MACROPLASTIQUE;  Surgeon: Shireen Mayo MD;  Location: [facility] OR 1ST FLR;  Service: Urology;;    CYSTOSCOPY WITH INJECTION OF PERIURETHRAL BULKING AGENT N/A 3/28/2023    Procedure: CYSTOSCOPY, WITH PERIURETHRAL BULKING AGENT INJECTION;  Surgeon:  Shireen Mayo MD;  [facility];  Service: Urology;  Laterality: N/A;  [place]    CYSTOSCOPY WITH INJECTION OF PERIURETHRAL BULKING AGENT N/A 11/14/2023    Procedure: CYSTOSCOPY, WITH PERIURETHRAL BULKING AGENT INJECTION;  Surgeon: Shireen Mayo MD;  [facility];  Service: Urology;  Laterality: N/A;    CYSTOSCOPY,WITH BOTULINUM TOXIN INJECTION N/A 12/13/2022    Procedure: CYSTOSCOPY,WITH BOTULINUM TOXIN INJECTION;  Surgeon: Shireen Mayo MD;  [facility];  Service: Urology;  Laterality: N/A;  300 U    CYSTOSCOPY,WITH BOTULINUM TOXIN INJECTION N/A 3/28/2023    Procedure: CYSTOSCOPY,WITH BOTULINUM TOXIN INJECTION;  Surgeon: Shireen Mayo MD;  [facility];  Service: Urology;  Laterality: N/A;  45 MIN.    300 UNITS    ESOPHAGOGASTRODUODENOSCOPY N/A 5/23/2018    Procedure: ESOPHAGOGASTRODUODENOSCOPY (EGD);  Surgeon: Prince Vance MD;  Location: [facility] (4TH FLR);  Service: Endoscopy;  Laterality: N/A;  r/s 'd per Dr. Vance due to family emergency- ER    ESOPHAGOGASTRODUODENOSCOPY N/A 6/23/2023    Procedure: EGD (ESOPHAGOGASTRODUODENOSCOPY);  Surgeon: Juaquin Barry MD;  Location: [facility] ENDO (2ND FLR);  Service: Endoscopy;  Laterality: N/A;  Refferal: PRINCE VANCE  Order  tele [facility] 5/18/23  dx: history of a Nissen fundoplication  Additional Scheduling Information:  On home oxygen 2nd floor for airway protection also with a pain pump elevated BMI close to 40   Prep Gastroparesis   ins    HYSTERECTOMY  1975    endometriosis    INJECTION OF BOTULINUM TOXIN TYPE A  7/13/2021    Procedure: INJECTION, BOTULINUM TOXIN, 200units;  Surgeon: Shireen Mayo MD;  [facility];  Service: Urology;;    INJECTION OF BOTULINUM TOXIN TYPE A  11/16/2021    Procedure: INJECTION, BOTULINUM TOXIN, 200units;  Surgeon: Shireen Mayo MD;  Location: [facility] OR [address] [place] [address];  Service: Urology;;    INJECTION OF BOTULINUM TOXIN TYPE A  7/19/2022     Procedure: INJECTION, BOTULINUM TOXIN, 300 units ;  Surgeon: Shireen Mayo MD;  [facility];  Service: Urology;;    INJECTION OF BOTULINUM TOXIN TYPE A N/A 11/14/2023    Procedure: INJECTION, BOTULINUM TOXIN, TYPE A;  Surgeon: Shireen Mayo MD;  [facility];  Service: Urology;  Laterality: N/A;    INSERTION, SUPRAPUBIC CATHETER N/A 12/13/2022    Procedure: INSERTION, SUPRAPUBIC CATHETER;  Surgeon: Shireen Mayo MD;  [facility];  Service: Urology;  Laterality: N/A;  exchange    INSERTION, SUPRAPUBIC CATHETER N/A 11/14/2023    Procedure: INSERTION, SUPRAPUBIC CATHETER;  Surgeon: Shireen Mayo MD;  [facility];  Service: Urology;  Laterality: N/A;  EXCHANGE of s/p tube    pain pump placement      SQ Dilaudid Pump managed by Dr. Hillman, Pain Management    REMOVAL OF BONE SPUR OF FOOT Bilateral 9/16/2022    Procedure: EXCISION ARTHRITIC BONE, BILATERAL FOOT;  Surgeon: NICOLA Mcgrath;  Location: [facility] OR;  Service: Podiatry;  Laterality: Bilateral;    REPLACEMENT OF BACLOFEN PUMP N/A 8/2/2023    Procedure: REPLACEMENT, BACLOFEN PUMP;  Surgeon: Smitha Condon MD;  [facility];  Service: Neurosurgery;  Laterality: N/A;  Anes: Gen  Blood: Type & Screen  Pos: Left Lat  Intrathecal Pump  Bed: Reg Reversed  Rad: C-arm  Pump: 40 cc, medtronics    REPLACEMENT OF CATHETER N/A 10/31/2019    Procedure: REPLACEMENT, CATHETER-SUPRAPUBIC;  Surgeon: Shireen Mayo MD;  [facility];  Service: Urology;  Laterality: N/A;    SPINAL CORD STIMULATOR REMOVAL      before [place]    SPINE SURGERY  5-13-13    CERVICAL FUSION    TONSILLECTOMY         Review of patient's allergies indicates:   Allergen Reactions    (d)-limonene flavor      Other reaction(s): difficult intubation  Other reaction(s): Difficulty breathing    Benadryl [diphenhydramine hcl] Shortness Of Breath    Fentanyl Itching, Nausea And Vomiting and Swelling              Imitrex [sumatriptan succinate] Shortness Of Breath    Oxycodone-acetaminophen Shortness Of Breath    Sulfamethoxazole-trimethoprim Anaphylaxis    Topiramate Shortness Of Breath    Vancomycin Anaphylaxis     Rash    Butorphanol tartrate     Evening primrose (oenothera biennis)     Latex     Pregabalin Other (See Comments)     tremors    Propoxyphene n-acetaminophen      Other reaction(s): Difficulty breathing    Sumatriptan     White petrolatum-zinc     Zinc acetate     Zinc oxide-white petrolatum      Other reaction(s): Difficulty breathing    Latex, natural rubber Itching and Rash    Phenytoin Rash and Other (See Comments)     Trouble breathing       Medications:  Medications Prior to Admission   Medication Sig    amitriptyline (ELAVIL) 25 MG tablet Take 1 tablet (25 mg total) by mouth every evening.    atorvastatin (LIPITOR) 80 MG tablet Take 1 tablet (80 mg total) by mouth once daily.    butalbital-acetaminophen-caffeine -40 mg (FIORICET, ESGIC) -40 mg per tablet Take 1 tablet by mouth daily as needed for Headaches.    calcium-vitamin D3 (OS-JACKIE 500 + D3) 500 mg-5 mcg (200 unit) per tablet Take 2 tablets by mouth 2 (two) times daily.    cyanocobalamin, vitamin B-12, 5,000 mcg Subl Place 1 tablet under the tongue once daily.    docusate sodium (COLACE) 100 MG capsule Take 200 mg by mouth every evening.    DULoxetine (CYMBALTA) 60 MG capsule Take 1 capsule (60 mg total) by mouth 2 (two) times daily.    fludrocortisone (FLORINEF) 0.1 mg Tab Take a half-tablet (50 mcg) by mouth once daily    furosemide (LASIX) 40 MG tablet Take 1 tablet (40 mg total) by mouth once daily.    HYDROcodone-acetaminophen (NORCO)  mg per tablet Take 1 tablet by mouth every 6 (six) hours as needed for Pain.    hydrocortisone (CORTEF) 10 MG Tab Take 1 tablet (10 mg total) by mouth every evening.    intrathecal pain pump compound Hydromorphone (7.5 mg/mL) infusion at 8.59 mg/day (0.3578 mg/hr) out of a total  reservoir volume of 40 mL  Pump filled monthly    levothyroxine (SYNTHROID) 150 MCG tablet Take 1 tablet (150 mcg total) by mouth before breakfast.    pantoprazole (PROTONIX) 40 MG tablet Take 1 tablet (40 mg total) by mouth once daily.    potassium chloride (MICRO-K) 10 MEQ CpSR Take 2 capsules (20 mEq total) by mouth once daily.    QUEtiapine (SEROQUEL) 100 MG Tab TAKE 1 TABLET (100 MG) BY MOUTH NIGHTLY    senna (SENNA LAX) 8.6 mg tablet Take 2 tablets by mouth 2 (two) times daily.    traZODone (DESYREL) 300 MG tablet Take 0.5 tablets (150 mg total) by mouth every evening.    [DISCONTINUED] promethazine (PHENERGAN) 25 MG tablet Take 1 tablet (25 mg total) by mouth every 6 (six) hours as needed for Nausea.    [DISCONTINUED] traMADoL (ULTRAM) 50 mg tablet Take 1 tablet (50 mg total) by mouth every 4 (four) hours as needed for Pain.    aspirin (ECOTRIN) 81 MG EC tablet Take 1 tablet (81 mg total) by mouth once daily. (Patient not taking: Reported on 5/22/2024)    darifenacin (ENABLEX) 7.5 MG Tb24 Take 1 tablet (7.5 mg total) by mouth once daily. (Patient not taking: Reported on 5/22/2024)    hydrOXYzine (ATARAX) 50 MG tablet Take 1 tablet (50 mg total) by mouth every 4 (four) hours as needed for Anxiety. (Patient not taking: Reported on 5/22/2024)    nitroGLYCERIN (NITROSTAT) 0.4 MG SL tablet Place 0.4 mg under the tongue every 5 (five) minutes as needed for Chest pain. (Patient not taking: Reported on 5/22/2024)    tiotropium bromide (SPIRIVA RESPIMAT) 2.5 mcg/actuation inhaler Inhale 2 puffs into the lungs once daily. Controller (Patient not taking: Reported on 5/22/2024)     Antibiotics (From admission, onward)      Start     Stop Route Frequency Ordered    05/23/24 1245  DAPTOmycin (CUBICIN) 775 mg in sodium chloride 0.9% SolP 50 mL IVPB         -- IV Every 24 hours (non-standard times) 05/23/24 1132          Antifungals (From admission, onward)      None          Antivirals (From admission, onward)      None  "            Immunization History   Administered Date(s) Administered    COVID-19, MRNA, LN-S, PF (Pfizer) (Gray Cap) 04/05/2022    COVID-19, MRNA, LN-S, PF (Pfizer) (Purple Cap) 04/02/2021, 04/23/2021    COVID-19, mRNA, LNP-S, PF (Moderna 2023)Ages 12+ 01/05/2024    COVID-19, mRNA, LNP-S, bivalent booster, PF (PFIZER OMICRON) 01/06/2023    Influenza 10/22/2008, 12/23/2010, 10/13/2011    Influenza (FLUAD) - Quadrivalent - Adjuvanted - PF *Preferred* (65+) 01/06/2023, 09/22/2023, 01/05/2024    Influenza - Quadrivalent 11/14/2014, 09/25/2015    Influenza - Quadrivalent - PF *Preferred* (6 months and older) 10/22/2008, 12/23/2010, 10/13/2011, 11/30/2017, 09/21/2018, 09/21/2018, 11/07/2019, 09/29/2020    PPD Test 02/10/2022, 01/17/2023, 02/10/2024    Pneumococcal Conjugate - 13 Valent 05/29/2014, 05/29/2014, 12/29/2017    Pneumococcal Conjugate - 20 Valent 09/22/2023    Pneumococcal Polysaccharide - 23 Valent 05/17/2018, 05/17/2018    Tdap 12/17/2015, 03/02/2017       Family History       Problem Relation (Age of Onset)    Cancer Mother (55), Father    Cirrhosis Paternal Aunt, Paternal Uncle    Colon cancer Maternal Uncle    Esophageal cancer Father    Heart disease Maternal Uncle    Liver disease Paternal Aunt, Paternal Uncle    No Known Problems Brother, Brother, Sister, Maternal Aunt, Maternal Grandfather, Paternal Grandmother, Paternal Grandfather    Parkinsonism Maternal Grandmother    Tremor Maternal Grandmother          Social History     Socioeconomic History    Marital status:    Tobacco Use    Smoking status: Never    Smokeless tobacco: Never   Substance and Sexual Activity    Alcohol use: Never    Drug use: Yes     Types: Marijuana     Comment: "medical marijuana gummies"     Social Determinants of Health     Financial Resource Strain: Low Risk  (3/19/2024)    Overall Financial Resource Strain (CARDIA)     Difficulty of Paying Living Expenses: Not very hard   Food Insecurity: No Food Insecurity " (3/19/2024)    Hunger Vital Sign     Worried About Running Out of Food in the Last Year: Never true     Ran Out of Food in the Last Year: Never true   Transportation Needs: No Transportation Needs (3/19/2024)    PRAPARE - Transportation     Lack of Transportation (Medical): No     Lack of Transportation (Non-Medical): No   Physical Activity: Inactive (3/19/2024)    Exercise Vital Sign     Days of Exercise per Week: 0 days     Minutes of Exercise per Session: 0 min   Stress: Stress Concern Present (3/19/2024)    [employer] [place] [employer] - Occupational Stress Questionnaire     Feeling of Stress : To some extent   Housing Stability: Low Risk  (3/19/2024)    Housing Stability Vital Sign     Unable to Pay for Housing in the Last Year: No     Number of Places Lived in the Last Year: 1     Unstable Housing in the Last Year: No     Review of Systems   Cardiovascular:  Positive for leg swelling.   Musculoskeletal:  Positive for arthralgias and myalgias.   Skin:  Positive for color change.   All other systems reviewed and are negative.    Objective:     Vital Signs (Most Recent):  Temp: 97.9 °F (36.6 °C) (05/23/24 0734)  Pulse: (!) 57 (05/23/24 1110)  Resp: 18 (05/23/24 0734)  BP: (!) 104/58 (05/23/24 0734)  SpO2: 95 % (05/23/24 0734) Vital Signs (24h Range):  Temp:  [97.8 °F (36.6 °C)-99.1 °F (37.3 °C)] 97.9 °F (36.6 °C)  Pulse:  [57-88] 57  Resp:  [16-18] 18  SpO2:  [95 %-100 %] 95 %  BP: ()/(50-73) 104/58     Weight: 96.6 kg (212 lb 15.4 oz)  Body mass index is 35.44 kg/m².    Estimated Creatinine Clearance: 78.4 mL/min (based on SCr of 0.8 mg/dL).     Physical Exam  Vitals and nursing note reviewed.   Constitutional:       Appearance: Normal appearance.   HENT:      Head: Normocephalic.   Cardiovascular:      Rate and Rhythm: Bradycardia present.   Pulmonary:      Breath sounds: No rhonchi or rales.   Musculoskeletal:         General: Swelling and tenderness present.      Right lower leg: Edema  present.      Left lower leg: Edema present.      Comments: Erythema and warmth; skin ulcerations posteriorly   Neurological:      Mental Status: She is alert.   Psychiatric:         Mood and Affect: Mood normal.         Behavior: Behavior normal.         Thought Content: Thought content normal.          Significant Labs: BMP:   Recent Labs   Lab 05/23/24  0536   GLU 95      K 4.0      CO2 28   BUN 9   CREATININE 0.8   CALCIUM 8.5*   MG 1.7     CBC:   Recent Labs   Lab 05/22/24  1357 05/22/24  1720 05/23/24  0536   WBC 5.47  --  4.63   HGB 10.6*  --  9.9*   HCT 34.4* 36 32.2*     --  228     Microbiology Results (last 7 days)       Procedure Component Value Units Date/Time    Blood culture [[id number]]     Order Status: Sent Specimen: Blood     Blood culture [[id number]]     Order Status: Sent Specimen: Blood             Significant Imaging: I have reviewed all pertinent imaging results/findings within the past 24 hours.

## 2024-05-23 NOTE — ASSESSMENT & PLAN NOTE
- Patient has acute metabolic encephalopathy unsure of etiology on admission  - Labs with unremarkable   - UA, CXR, lactic and blood cultures pending   -UA noninfectious    -CXR reviewed - edema v consolidation (will assess response to diuresis)   -LA wnl    -blood cultures pending    -0/4 SIRS  - CT head without acute intracranial findings  - VBG stable - c/w COPD   - bilateral LE with redness, start emperic linezolid for suspected cellulitis   - Treat the underlying cause and monitor for improvement  - Monitor with q4 neuro checks  - Avoid narcotics and benzos that will exacerbate agitation, and use PRN anti-psychotics for controls of behavior for self harm.    - ddx: infection, uncontrolled hypothyroidism, polypharmacy, medication mismanagement, hypercapnia, worsening dementia, delirium  - Delirium precautions  --> Encephalopathy resolved 5/23  --> will address polypharmacy, continue cellulitis management, discuss dispo with family and CM

## 2024-05-23 NOTE — ASSESSMENT & PLAN NOTE
Prior concerns for abnormal ACTH stimulation test in 2023.  Most likely degree of cortisol elevation with testing was normal in the setting of hypoalbuminemia at the time.  Has been on once nightly hydrocortisone dosing but compliance is unknown.  Also on Florinef but may have been added in setting of orthostatic hypotension concerns.     Had prior 8 am cortisol levels outpatient but unknown if these were in the setting of HC use of if doses were held prior to labs.    Plan:  - Holding hydrocortisone dosing tonight  - ACTH stimulation testing will be arranged in the morning (5/24)   - If results not concerning then HC will need to be removed from her home medication list    - No immediate concerns for primary adrenal insuffiencey which would be the only endocrine related indication to use Florinef.  Will have more evidence of AI rule out vs confirmation tomorrow.  If concerns for orthostatic hypotension then Florinef can be helpful vs Midodrine.  Defer to primary team and outside providers on it's use.  Chronic pain medication use with pain pump could be playing a role in lower BP's

## 2024-05-23 NOTE — ASSESSMENT & PLAN NOTE
"- CXR reviewed: "Increased parenchymal attenuation projects over the left lower lung zone, may reflect atelectasis however developing consolidation not excluded."  - BNP 27  - troponin <0.006  Results for orders placed during the hospital encounter of 05/08/24    Echo    Interpretation Summary    Left Ventricle: The left ventricle is normal in size. Normal wall thickness. There is normal systolic function. Biplane (2D) method of discs ejection fraction is 69%. Grade I diastolic dysfunction.    Right Ventricle: Normal right ventricular cavity size. Systolic function is normal. TAPSE is 3.66 cm.    Normal left atrial size. The left atrium volume index is 28.1 mL/m2.    Normal right atrial size.    The aortic valve is structurally normal. There is normal leaflet mobility.    The mitral valve is structurally normal. There is normal leaflet mobility.    The tricuspid valve is structurally normal. There is normal leaflet mobility.      "

## 2024-05-23 NOTE — PLAN OF CARE
Problem: Adult Inpatient Plan of Care  Goal: Plan of Care Review  5/23/2024 0630 by Nicolette Conn LPN  Outcome: Progressing  5/23/2024 0630 by Nicolette Conn LPN  Outcome: Progressing     Problem: Infection  Goal: Absence of Infection Signs and Symptoms  5/23/2024 0630 by Nicolette Conn LPN  Outcome: Progressing  5/23/2024 0630 by Nicolette Conn LPN  Outcome: Progressing     Problem: Wound  Goal: Optimal Coping  5/23/2024 0630 by Nicolette Conn LPN  Outcome: Progressing  5/23/2024 0630 by Nicolette Conn LPN  Outcome: Progressing     Problem: Skin Injury Risk Increased  Goal: Skin Health and Integrity  5/23/2024 0630 by Nicolette Conn LPN  Outcome: Progressing  5/23/2024 0630 by Nicolette Conn LPN  Outcome: Progressing

## 2024-05-23 NOTE — ASSESSMENT & PLAN NOTE
Physical deconditioning   Hemiplegia and hemiparesis following cerebral infarction affecting left non-dominant side  - recent stay in SNF, has not been able to take care of herself at home  - CM will discuss additional resources following d/c - likely needing new long-term NH placement  - CT head without acute findings  - fall precautions   - consider PT/OT consult but not likely a candidate as she was just discharged

## 2024-05-23 NOTE — HPI
"Ms. Hawley is a pleasant 68 y.o. woman admitted with cellulitis of her legs due to chronic edema. She has COPD (on 4L), HFpEF, chronic BLE lymphedema,adrenal insufficiency, diastolic HF, indwelling suprapubic catheter presents to the ED after having 6 falls. She tells me that her pump was turned doen and that her legs started to swell. When she looked down, she saw that they were very red. Her falls were due, in part, to her lymphedema. She was evaluated in the ED and started on Zyvox for cellulitis. ID is consulted for "On linezolid d/t hx of anaphylaxis from vancomycin."    Lives at home with . Daughter at bedside provided additional history.    "

## 2024-05-23 NOTE — PHARMACY MED REC
"      Admission Medication History     The home medication history was taken by Ray Bryson.    You may go to "Admission" then "Reconcile Home Medications" tabs to review and/or act upon these items.     The home medication list has been updated by the Pharmacy department.   Please read ALL comments highlighted in yellow.   Please address this information as you see fit.    Feel free to contact us if you have any questions or require assistance.      The medications listed below were removed from the home medication list. Please reorder if appropriate:  Patient reports no longer taking the following medication(s):  DARIFENACIN 7.5 MG TABLET  FLUCONAZOLE 200 MG TAB LET  HYDROXYZINE 50 MG TABLET  LIDOCAINE 5 % PATCH  LINACLOTIDE 72 MCG CAPSULE  NYSTATIN POWDER  ONDANSETRON ODT 4 MG TABLET  TRIAMCINOLONE ACETONIDE 0.1 % CREAM    Medications listed below were obtained from: Patient/family and Analytic software- [employer] Medications   Medication Sig    amitriptyline (ELAVIL) 25 MG tablet   Take 1 tablet (25 mg total) by mouth every evening.    atorvastatin (LIPITOR) 80 MG tablet   Take 1 tablet (80 mg total) by mouth once daily.    butalbital-acetaminophen-caffeine -40 mg (FIORICET, ESGIC) -40 mg per tablet   Take 1 tablet by mouth daily as needed for headaches.    calcium-vitamin D3 (OS-JACKIE 500 + D3) 500 mg-5 mcg (200 unit) per tablet   Take 2 tablets by mouth 2 (two) times daily.    cyanocobalamin, vitamin B-12, 5,000 mcg Subl   Place 1 tablet under the tongue once daily.    docusate sodium (COLACE) 100 MG capsule   Take 200 mg by mouth every evening.    DULoxetine (CYMBALTA) 60 MG capsule   Take 1 capsule (60 mg total) by mouth 2 (two) times daily.    fludrocortisone (FLORINEF) 0.1 mg Tab   Take a half-tablet (50 mcg) by mouth once daily    furosemide (LASIX) 40 MG tablet   Take 1 tablet (40 mg total) by mouth once daily.    HYDROcodone-acetaminophen (NORCO)  mg per tablet   Take 1 tablet by " mouth every 6 (six) hours as needed for pain.    hydrocortisone (CORTEF) 10 MG Tab   Take 1 tablet (10 mg total) by mouth every evening.    intrathecal pain pump compound       Hydromorphone (7.5 mg/mL) infusion at 8.59 mg/day (0.3578 mg/hr) out of a total reservoir volume of 40 mL  Pump filled monthly    levothyroxine (SYNTHROID) 150 MCG tablet   Take 1 tablet (150 mcg total) by mouth before breakfast.    pantoprazole (PROTONIX) 40 MG tablet   Take 1 tablet (40 mg total) by mouth once daily.    potassium chloride (MICRO-K) 10 MEQ CpSR   Take 2 capsules (20 mEq total) by mouth once daily.    promethazine (PHENERGAN) 25 MG tablet   Take 1 tablet (25 mg total) by mouth every 6 (six) hours as needed for nausea.    QUEtiapine (SEROQUEL) 100 MG Tab   Take 1 tablet by mouth every evening.      senna (SENNA LAX) 8.6 mg tablet   Take 2 tablets by mouth 2 (two) times daily.    traMADoL (ULTRAM) 50 mg tablet   Take 1 tablet (50 mg total) by mouth every 4 (four) hours as needed for pain.    traZODone (DESYREL) 300 MG tablet   Take 0.5 tablets (150 mg total) by mouth every evening.    aspirin (ECOTRIN) 81 MG EC tablet   Take 1 tablet (81 mg total) by mouth once daily. (Patient not taking: Reported on 5/22/2024)    darifenacin (ENABLEX) 7.5 MG Tb24   Take 1 tablet (7.5 mg total) by mouth once daily. (Patient not taking: Reported on 5/22/2024)    hydrOXYzine (ATARAX) 50 MG tablet     Take 1 tablet (50 mg total) by mouth every 4 (four) hours as needed for anxiety.   (Patient not taking: Reported on 5/22/2024)    nitroGLYCERIN (NITROSTAT) 0.4 MG SL tablet     Place 0.4 mg under the tongue every 5 (five) minutes as needed for chest pain.   (Patient not taking: Reported on 5/22/2024)    tiotropium bromide (SPIRIVA RESPIMAT) 2.5 mcg/actuation inhaler   Inhale 2 puffs into the lungs once daily. Controller (Patient not taking: Reported on 5/22/2024)       Potential issues to be addressed PRIOR TO DISCHARGE  Please discuss with the  patient barriers to adherence with medication treatment plans  Patient requires education regarding drug therapies     Ray Bryson  EXT [phone]            .

## 2024-05-23 NOTE — ASSESSMENT & PLAN NOTE
Chronic with now concerns for cellulitis.  Management per primary and now ID following for antibiotics.

## 2024-05-24 ENCOUNTER — TELEPHONE (OUTPATIENT)
Dept: INTERNAL MEDICINE | Facility: CLINIC | Age: 69
End: 2024-05-24
Payer: MEDICARE

## 2024-05-24 LAB
ANION GAP SERPL CALC-SCNC: 6 MMOL/L (ref 8–16)
BUN SERPL-MCNC: 10 MG/DL (ref 8–23)
CALCIUM SERPL-MCNC: 8.1 MG/DL (ref 8.7–10.5)
CHLORIDE SERPL-SCNC: 102 MMOL/L (ref 95–110)
CK SERPL-CCNC: 43 U/L (ref 20–180)
CO2 SERPL-SCNC: 32 MMOL/L (ref 23–29)
CORTIS SERPL-MCNC: 11.6 UG/DL
CORTIS SERPL-MCNC: 13.8 UG/DL
CORTIS SERPL-MCNC: 9.9 UG/DL
CREAT SERPL-MCNC: 1 MG/DL (ref 0.5–1.4)
ERYTHROCYTE [DISTWIDTH] IN BLOOD BY AUTOMATED COUNT: 13.8 % (ref 11.5–14.5)
EST. GFR  (NO RACE VARIABLE): >60 ML/MIN/1.73 M^2
GLUCOSE SERPL-MCNC: 94 MG/DL (ref 70–110)
HCT VFR BLD AUTO: 31.4 % (ref 37–48.5)
HGB BLD-MCNC: 9.7 G/DL (ref 12–16)
MAGNESIUM SERPL-MCNC: 1.8 MG/DL (ref 1.6–2.6)
MCH RBC QN AUTO: 27.5 PG (ref 27–31)
MCHC RBC AUTO-ENTMCNC: 30.9 G/DL (ref 32–36)
MCV RBC AUTO: 89 FL (ref 82–98)
PLATELET # BLD AUTO: 210 K/UL (ref 150–450)
PMV BLD AUTO: 9.8 FL (ref 9.2–12.9)
POTASSIUM SERPL-SCNC: 4 MMOL/L (ref 3.5–5.1)
RBC # BLD AUTO: 3.53 M/UL (ref 4–5.4)
SODIUM SERPL-SCNC: 140 MMOL/L (ref 136–145)
WBC # BLD AUTO: 4.5 K/UL (ref 3.9–12.7)

## 2024-05-24 PROCEDURE — 94761 N-INVAS EAR/PLS OXIMETRY MLT: CPT | Mod: HCNC

## 2024-05-24 PROCEDURE — 21400001 HC TELEMETRY ROOM: Mod: HCNC

## 2024-05-24 PROCEDURE — 25000003 PHARM REV CODE 250: Mod: HCNC | Performed by: INTERNAL MEDICINE

## 2024-05-24 PROCEDURE — 99233 SBSQ HOSP IP/OBS HIGH 50: CPT | Mod: HCNC,,, | Performed by: INTERNAL MEDICINE

## 2024-05-24 PROCEDURE — 25000242 PHARM REV CODE 250 ALT 637 W/ HCPCS: Mod: HCNC

## 2024-05-24 PROCEDURE — 85027 COMPLETE CBC AUTOMATED: CPT | Mod: HCNC

## 2024-05-24 PROCEDURE — 25000003 PHARM REV CODE 250: Mod: HCNC | Performed by: STUDENT IN AN ORGANIZED HEALTH CARE EDUCATION/TRAINING PROGRAM

## 2024-05-24 PROCEDURE — 63600175 PHARM REV CODE 636 W HCPCS: Mod: HCNC | Performed by: STUDENT IN AN ORGANIZED HEALTH CARE EDUCATION/TRAINING PROGRAM

## 2024-05-24 PROCEDURE — 36415 COLL VENOUS BLD VENIPUNCTURE: CPT | Mod: HCNC,XB | Performed by: STUDENT IN AN ORGANIZED HEALTH CARE EDUCATION/TRAINING PROGRAM

## 2024-05-24 PROCEDURE — 25000003 PHARM REV CODE 250: Mod: HCNC

## 2024-05-24 PROCEDURE — 36415 COLL VENOUS BLD VENIPUNCTURE: CPT | Mod: HCNC

## 2024-05-24 PROCEDURE — 82550 ASSAY OF CK (CPK): CPT | Mod: HCNC | Performed by: INTERNAL MEDICINE

## 2024-05-24 PROCEDURE — 94640 AIRWAY INHALATION TREATMENT: CPT | Mod: HCNC

## 2024-05-24 PROCEDURE — 80048 BASIC METABOLIC PNL TOTAL CA: CPT | Mod: HCNC

## 2024-05-24 PROCEDURE — 63600175 PHARM REV CODE 636 W HCPCS: Mod: HCNC

## 2024-05-24 PROCEDURE — 63600175 PHARM REV CODE 636 W HCPCS: Mod: HCNC | Performed by: INTERNAL MEDICINE

## 2024-05-24 PROCEDURE — 82024 ASSAY OF ACTH: CPT | Mod: HCNC | Performed by: STUDENT IN AN ORGANIZED HEALTH CARE EDUCATION/TRAINING PROGRAM

## 2024-05-24 PROCEDURE — 83735 ASSAY OF MAGNESIUM: CPT | Mod: HCNC

## 2024-05-24 PROCEDURE — 82533 TOTAL CORTISOL: CPT | Mod: 91,HCNC | Performed by: STUDENT IN AN ORGANIZED HEALTH CARE EDUCATION/TRAINING PROGRAM

## 2024-05-24 PROCEDURE — 99232 SBSQ HOSP IP/OBS MODERATE 35: CPT | Mod: HCNC,,, | Performed by: INTERNAL MEDICINE

## 2024-05-24 PROCEDURE — 27000221 HC OXYGEN, UP TO 24 HOURS: Mod: HCNC

## 2024-05-24 RX ORDER — HYDROCORTISONE 5 MG/1
5 TABLET ORAL
Status: DISCONTINUED | OUTPATIENT
Start: 2024-05-25 | End: 2024-05-29 | Stop reason: HOSPADM

## 2024-05-24 RX ORDER — QUETIAPINE FUMARATE 25 MG/1
50 TABLET, FILM COATED ORAL NIGHTLY
Status: DISCONTINUED | OUTPATIENT
Start: 2024-05-24 | End: 2024-05-29 | Stop reason: HOSPADM

## 2024-05-24 RX ORDER — ACETAMINOPHEN 500 MG
1000 TABLET ORAL EVERY 8 HOURS
Status: DISCONTINUED | OUTPATIENT
Start: 2024-05-24 | End: 2024-05-26

## 2024-05-24 RX ORDER — COSYNTROPIN 0.25 MG/ML
0.25 INJECTION, POWDER, FOR SOLUTION INTRAMUSCULAR; INTRAVENOUS ONCE
Status: COMPLETED | OUTPATIENT
Start: 2024-05-24 | End: 2024-05-24

## 2024-05-24 RX ORDER — ACETAMINOPHEN 500 MG
1000 TABLET ORAL EVERY 8 HOURS
Qty: 60 TABLET | Refills: 0 | Status: SHIPPED | OUTPATIENT
Start: 2024-05-24 | End: 2024-05-27

## 2024-05-24 RX ORDER — HYDROCORTISONE 5 MG/1
10 TABLET ORAL DAILY
Status: DISCONTINUED | OUTPATIENT
Start: 2024-05-25 | End: 2024-05-29 | Stop reason: HOSPADM

## 2024-05-24 RX ORDER — HYDROCORTISONE 5 MG/1
15 TABLET ORAL ONCE
Status: COMPLETED | OUTPATIENT
Start: 2024-05-24 | End: 2024-05-24

## 2024-05-24 RX ADMIN — DULOXETINE HYDROCHLORIDE 60 MG: 30 CAPSULE, DELAYED RELEASE ORAL at 09:05

## 2024-05-24 RX ADMIN — DOCUSATE SODIUM 200 MG: 50 CAPSULE, LIQUID FILLED ORAL at 09:05

## 2024-05-24 RX ADMIN — AMITRIPTYLINE HYDROCHLORIDE 25 MG: 25 TABLET, FILM COATED ORAL at 09:05

## 2024-05-24 RX ADMIN — LEVOTHYROXINE SODIUM 150 MCG: 150 TABLET ORAL at 05:05

## 2024-05-24 RX ADMIN — KETOROLAC TROMETHAMINE 15 MG: 15 INJECTION, SOLUTION INTRAMUSCULAR; INTRAVENOUS at 01:05

## 2024-05-24 RX ADMIN — OXYBUTYNIN CHLORIDE 5 MG: 5 TABLET, EXTENDED RELEASE ORAL at 09:05

## 2024-05-24 RX ADMIN — ACETAMINOPHEN 1000 MG: 325 TABLET ORAL at 09:05

## 2024-05-24 RX ADMIN — ONDANSETRON 8 MG: 8 TABLET, ORALLY DISINTEGRATING ORAL at 04:05

## 2024-05-24 RX ADMIN — FLUDROCORTISONE ACETATE 50 MCG: 0.1 TABLET ORAL at 09:05

## 2024-05-24 RX ADMIN — QUETIAPINE FUMARATE 50 MG: 25 TABLET ORAL at 09:05

## 2024-05-24 RX ADMIN — KETOROLAC TROMETHAMINE 15 MG: 15 INJECTION, SOLUTION INTRAMUSCULAR; INTRAVENOUS at 09:05

## 2024-05-24 RX ADMIN — ACETAMINOPHEN 1000 MG: 325 TABLET ORAL at 01:05

## 2024-05-24 RX ADMIN — HYDROCORTISONE 15 MG: 5 TABLET ORAL at 06:05

## 2024-05-24 RX ADMIN — TIOTROPIUM BROMIDE INHALATION SPRAY 2 PUFF: 3.12 SPRAY, METERED RESPIRATORY (INHALATION) at 07:05

## 2024-05-24 RX ADMIN — ATORVASTATIN CALCIUM 80 MG: 40 TABLET, FILM COATED ORAL at 09:05

## 2024-05-24 RX ADMIN — SENNOSIDES 2 TABLET: 8.6 TABLET, FILM COATED ORAL at 09:05

## 2024-05-24 RX ADMIN — SODIUM CHLORIDE 500 ML: 9 INJECTION, SOLUTION INTRAVENOUS at 10:05

## 2024-05-24 RX ADMIN — DAPTOMYCIN 775 MG: 350 INJECTION, POWDER, LYOPHILIZED, FOR SOLUTION INTRAVENOUS at 05:05

## 2024-05-24 RX ADMIN — ENOXAPARIN SODIUM 40 MG: 40 INJECTION SUBCUTANEOUS at 05:05

## 2024-05-24 RX ADMIN — PANTOPRAZOLE SODIUM 40 MG: 40 TABLET, DELAYED RELEASE ORAL at 09:05

## 2024-05-24 RX ADMIN — COSYNTROPIN 0.25 MG: 0.25 INJECTION, POWDER, LYOPHILIZED, FOR SOLUTION INTRAMUSCULAR; INTRAVENOUS at 12:05

## 2024-05-24 NOTE — PROGRESS NOTES
"[facility] [address] [place] [address] Medicine  Progress Note    Patient Name: Tasha Hawley  MRN: [id number]  Patient Class: IP- Inpatient   Admission Date: 5/22/2024  Length of Stay: 1 days  Attending Physician: Rebecca Sahni MD  Primary Care Provider: Mesfin Hodges II, MD        Subjective:     Principal Problem:Bilateral cellulitis of lower leg        HPI:  Tasha Hawley is a 68 y.o. female with a history of COPD (on 4L), HFpEF, chronic BLE lymphedema,adrenal insufficiency, diastolic HF, indwelling suprapubic catheter presents to the ED after having 6 falls yesterday. Patient is a poor historian and lethargic on exam. Awakens to verbal stimuli. Oriented to person and place, but unable to provide any history on my interview. She had received IV morphine a couple hours prior to this. Per ED provider: "but reports falling onto her R shoulder multiple times yesterday a may have hit her head. Pt reports resultant R shoulder pain with movement of RUE. Additionally, pt reports having worsening BLE pain/swelling for the last few days despite compliance with Lasix 80mg TID. However, she drinks 10-12 cans of 7up daily and does not follow any low sodium diet. Discussed with pt daughter about her condition, pt was discharged from SNF recently and has since deteriorated and has not been taking care of herself. Pt daughter unsure of her mother's medication compliance. Additionally, there have been issues securing home health services due to insurance issues."    Called daughter Lisa, who reports that sometimes her mom becomes very lethargic like this. States that she is unsure if her mom is taking medication appropriately. Patient lives in a  home with her  who is in his 80s and is unable to help care for her. Lisa states that her mom was doing very well after SNF last month, but she thinks when HH came by that it took her mom too long to get to the door so they left. She has significantly declined over the " last month. Lisa is considering NH placement and would like to discuss further with SW/CM.     ED: VSS, satting well on home 3L. CBC with chronic stable anemia. CMP unremarkable. BNP 27, troponin <0.006. R shoulder xray negative for acute fracture or dislocation. EKG in NSR. CT head pending.     Overview/Hospital Course:  Tasha Hawley was placed in  observation for acute encephalopathy. Suspected due to polypharmacy, however infectious encephalopathy possible. Patient found to have significant BLE swelling with skin breakdown and erythema concerning for cellulitis. Starting linezolid, wound care. Fluid removal with IV diuresis, adjusting dosing as indicated. Suspect uncontrolled hypothyroidism due to poor med compliance, resuming home dose of synthroid 150. CM assisting with dc planning - likely needing retirement NH placement.     Interval History: NAEON. AFVSS. Patient alert and oriented this morning, daughter at bedside reporting she is baseline. BLE with significant swelling and erythema, skin breakdown. IV diuresis, IV abx, wound care.     Review of Systems   Constitutional:  Negative for chills and fever.   Respiratory:  Negative for chest tightness and shortness of breath.    Cardiovascular:  Positive for leg swelling. Negative for chest pain.   Gastrointestinal:  Negative for abdominal pain and nausea.   Musculoskeletal:         +falls   Skin:  Positive for color change and wound.        BLE pain   Neurological:  Positive for weakness. Negative for dizziness.   Psychiatric/Behavioral:  Positive for sleep disturbance.      Objective:     Vital Signs (Most Recent):  Temp: 97.9 °F (36.6 °C) (05/23/24 0734)  Pulse: (!) 57 (05/23/24 1110)  Resp: 18 (05/23/24 0734)  BP: (!) 104/58 (05/23/24 0734)  SpO2: 95 % (05/23/24 0734) Vital Signs (24h Range):  Temp:  [97.8 °F (36.6 °C)-99.1 °F (37.3 °C)] 97.9 °F (36.6 °C)  Pulse:  [57-88] 57  Resp:  [16-18] 18  SpO2:  [95 %-100 %] 95 %  BP: ()/(50-73) 104/58      Weight: 96.6 kg (212 lb 15.4 oz)  Body mass index is 35.44 kg/m².    Intake/Output Summary (Last 24 hours) at 5/23/2024 1117  Last data filed at 5/22/2024 1841  Gross per 24 hour   Intake 120 ml   Output 2000 ml   Net -1880 ml         Physical Exam  Vitals and nursing note reviewed.   Constitutional:       Appearance: She is well-developed. She is obese. She is ill-appearing.   HENT:      Head: Normocephalic and atraumatic.   Eyes:      Extraocular Movements: Extraocular movements intact.      Pupils: Pupils are equal, round, and reactive to light.   Cardiovascular:      Rate and Rhythm: Normal rate and regular rhythm.      Pulses: Normal pulses.   Pulmonary:      Effort: Pulmonary effort is normal.      Breath sounds: Normal breath sounds.      Comments: 4L NC (baseline)  Abdominal:      Palpations: Abdomen is soft.      Tenderness: There is no abdominal tenderness.   Musculoskeletal:         General: Swelling and tenderness present. Normal range of motion.      Right lower leg: Edema present.      Left lower leg: Edema present.      Comments: Significant 3+ pitting BLE edema. Poor skin integrity, circumferential. Skin breakdown bilateral posterior calves, weeping serosanguinous fluid. Erythema extending feet to upper skins.    Skin:     General: Skin is warm.      Findings: Erythema and lesion present.   Neurological:      Mental Status: She is alert and oriented to person, place, and time.   Psychiatric:         Behavior: Behavior normal.             Significant Labs: All pertinent labs within the past 24 hours have been reviewed.  CBC:   Recent Labs   Lab 05/22/24  1357 05/22/24  1720 05/23/24  0536   WBC 5.47  --  4.63   HGB 10.6*  --  9.9*   HCT 34.4* 36 32.2*     --  228     CMP:   Recent Labs   Lab 05/22/24  1357 05/23/24  0536    139   K 3.8 4.0    104   CO2 25 28   GLU 95 95   BUN 7* 9   CREATININE 0.8 0.8   CALCIUM 9.1 8.5*   PROT 6.5  --    ALBUMIN 3.0*  --    BILITOT 0.3  --     ALKPHOS 143*  --    AST 17  --    ALT 14  --    ANIONGAP 9 7*     Cardiac Markers:   Recent Labs   Lab 05/22/24  1357   BNP 27     Troponin:   Recent Labs   Lab 05/22/24  1357   TROPONINI <0.006     TSH:   Recent Labs   Lab 05/22/24  1357   TSH 26.179*     Urine Studies:   Recent Labs   Lab 05/22/24  1934   COLORU Colorless*   APPEARANCEUA Clear   PHUR 6.0   SPECGRAV 1.010   PROTEINUA Negative   GLUCUA Negative   KETONESU Negative   BILIRUBINUA Negative   OCCULTUA 1+*   NITRITE Negative   LEUKOCYTESUR Trace*   RBCUA 10*   WBCUA 2   BACTERIA Rare       Significant Imaging: I have reviewed all pertinent imaging results/findings within the past 24 hours.    Assessment/Plan:      Encephalopathy, metabolic  - Patient has acute metabolic encephalopathy unsure of etiology on admission  - Labs with unremarkable   - UA, CXR, lactic and blood cultures pending   -UA noninfectious    -CXR reviewed - edema v consolidation (will assess response to diuresis)   -LA wnl    -blood cultures pending    -0/4 SIRS  - CT head without acute intracranial findings  - VBG stable - c/w COPD   - bilateral LE with redness, start emperic linezolid for suspected cellulitis   - Treat the underlying cause and monitor for improvement  - Monitor with q4 neuro checks  - Avoid narcotics and benzos that will exacerbate agitation, and use PRN anti-psychotics for controls of behavior for self harm.    - ddx: infection, uncontrolled hypothyroidism, polypharmacy, medication mismanagement, hypercapnia, worsening dementia, delirium  - Delirium precautions  --> Encephalopathy resolved 5/23  --> will address polypharmacy, continue cellulitis management, discuss dispo with family and CM      Insomnia  - HOLD amitriptyline, trazodone and seroquel as may be contributing to encephalopathy     COPD (chronic obstructive pulmonary disease)  Patient's COPD is controlled currently.  Patient is currently off COPD Pathway. Continue Supplemental oxygen and monitor  "respiratory status closely.   - on 4L at home     Adrenal insufficiency  - continue fludrocortisone and hydrocortisone      Chronic diastolic heart failure  - CXR reviewed: "Increased parenchymal attenuation projects over the left lower lung zone, may reflect atelectasis however developing consolidation not excluded."  - BNP 27  - troponin <0.006  Results for orders placed during the hospital encounter of 05/08/24    Echo    Interpretation Summary    Left Ventricle: The left ventricle is normal in size. Normal wall thickness. There is normal systolic function. Biplane (2D) method of discs ejection fraction is 69%. Grade I diastolic dysfunction.    Right Ventricle: Normal right ventricular cavity size. Systolic function is normal. TAPSE is 3.66 cm.    Normal left atrial size. The left atrium volume index is 28.1 mL/m2.    Normal right atrial size.    The aortic valve is structurally normal. There is normal leaflet mobility.    The mitral valve is structurally normal. There is normal leaflet mobility.    The tricuspid valve is structurally normal. There is normal leaflet mobility.        Pure hypercholesterolemia  - continue statin       Lymphedema of both lower extremities  Cellulitis     - acute on chronic lymphedema   - 80mg of IV lasix given in the ED   - bilateral LE US ordered - neg for DVT  - monitor response and can continue to diureses as needed    - 2L output with 80 IV lasix x1   - decrease to 20 IV BID and monitor response   - wound care consulted  - strict I/Os   - fluid restriction   - concern for cellulitis-- start linezolid q12 hrs as she has a hx of anaphalaxis to vancomycin   - ID consulted for approval       Hypothyroidism (acquired)  - continue synthroid -- unsure if patient has been compliant though  - TSH 26.179, T4 0.69  - consult endocrine    Frequent falls  Physical deconditioning   Hemiplegia and hemiparesis following cerebral infarction affecting left non-dominant side  - recent stay in SNF, " has not been able to take care of herself at home  - CM will discuss additional resources following d/c - likely needing new senior living NH placement  - CT head without acute findings  - fall precautions   - consider PT/OT consult but not likely a candidate as she was just discharged    Neurogenic bladder  Suprapubic catheter  - darifenacin not on formulary here, will start oxybutynin      Severe obesity (BMI 35.0-39.9) with comorbidity  Body mass index is 35.45 kg/m². Morbid obesity complicates all aspects of disease management from diagnostic modalities to treatment. Weight loss encouraged and health benefits explained to patient.         Iron deficiency anemia  Patient's anemia is currently controlled. Has not received any PRBCs to date. Etiology likely d/t Iron deficiency  Current CBC reviewed-   Lab Results   Component Value Date    HGB 10.6 (L) 05/22/2024    HCT 34.4 (L) 05/22/2024     Monitor serial CBC and transfuse if patient becomes hemodynamically unstable, symptomatic or H/H drops below 7/21.    Sleep apnea  - CPAP QHS      Generalized anxiety disorder  - on amitriptyline, cymbalta  - holding in the setting of acute AMS of undetermined source        VTE Risk Mitigation (From admission, onward)           Ordered     enoxaparin injection 40 mg  Daily         05/22/24 1540     IP VTE HIGH RISK PATIENT  Once         05/22/24 1540                    Discharge Planning   JACKIE: 5/27/2024     Code Status: Full Code   Is the patient medically ready for discharge?:     Reason for patient still in hospital (select all that apply): Patient trending condition, Laboratory test, Treatment, Consult recommendations, and Pending disposition  Discharge Plan A: Home Health                  Marjan Cervantes PA-C  Department of Hospital Medicine   [facility]

## 2024-05-24 NOTE — CARE UPDATE
Endocrine Care Update: 05/24/2024    Reviewed the current results from the ACTH stimulation test earlier today.  These show a fairly good baseline cortisol despite being mid-day of 9.9.  Medication with Cosyntropin was given at 12:54 pm and subsequent lab draws taken but according to below charting this shows the 30 minute cortisol was drawn at the 78 minute shyla and the 60 minute cortisol was drawn at 118 minute shyla.     The ACTH appears to have also been drawn after injection given instead of before as ordered.    Results showed max cortisol was 13.9 but uncertain if there was a true peak closer to our goal of 16 to 18 earlier than the 68 minute lab draw.     Latest Reference Range & Units 05/24/24 12:49 05/24/24 14:12 05/24/24 14:52   Cortisol ug/dL 9.90 11.60 13.80     The suspicion for true adrenal insufficiency does remain lower but given the difficulty in obtaining accurate lab timing and issues with some hypotension (felt to be partly pain medication induced), we will reorder hydrocortisone with slightly altered dosing.     Plan:  - Hydrocortisone 15 mg dose now x 1  - Tomorrow will start 10 mg dose of HC in the morning and 5 mg dose of HC in the afternoon (2-4 pm).    - Will plan to set up outpatient ACTH stimulation testing in the endocrine clinic to ensure accurate lab timing  - Will follow up in July as well for endocrine clinic visit    We will discuss the results further with the patient and family in the morning.  Reach out for now with questions.      Betito Andrade DO  Endocrine

## 2024-05-24 NOTE — CONSULTS
"[facility]  Wound Care    Patient Name:  Tasha Hawley   MRN:  [id number]  Date: 5/24/2024  Diagnosis: Bilateral cellulitis of lower leg    History:     Past Medical History:   Diagnosis Date    Anxiety     Arthritis     Bilateral lower extremity edema     severe chronic    Carotid artery occlusion     Cataract     CHF (congestive heart failure)     Coronary artery disease     subtotalled LAD with collateral    Depression     Fever blister     Hard of hearing     Hypokalemia 01/09/2023    Hyponatremia 02/04/2022    Hypothyroid     Iron deficiency anemia     Lumbar radiculopathy     with chronic pain    Ocular migraine     Other emphysema 05/22/2023    Renal disorder     Sleep apnea     cpap       Social History     Socioeconomic History    Marital status:    Tobacco Use    Smoking status: Never    Smokeless tobacco: Never   Substance and Sexual Activity    Alcohol use: Never    Drug use: Yes     Types: Marijuana     Comment: "medical marijuana gummies"     Social Determinants of Health     Financial Resource Strain: Low Risk  (3/19/2024)    Overall Financial Resource Strain (CARDIA)     Difficulty of Paying Living Expenses: Not very hard   Food Insecurity: No Food Insecurity (3/19/2024)    Hunger Vital Sign     Worried About Running Out of Food in the Last Year: Never true     Ran Out of Food in the Last Year: Never true   Transportation Needs: No Transportation Needs (3/19/2024)    PRAPARE - Transportation     Lack of Transportation (Medical): No     Lack of Transportation (Non-Medical): No   Physical Activity: Inactive (3/19/2024)    Exercise Vital Sign     Days of Exercise per Week: 0 days     Minutes of Exercise per Session: 0 min   Stress: Stress Concern Present (3/19/2024)    [employer] [place] [employer] - Occupational Stress Questionnaire     Feeling of Stress : To some extent   Housing Stability: Low Risk  (3/19/2024)    Housing Stability Vital Sign     Unable to Pay for " Housing in the Last Year: No     Number of Places Lived in the Last Year: 1     Unstable Housing in the Last Year: No       Precautions:     Allergies as of 05/22/2024 - Reviewed 05/22/2024   Allergen Reaction Noted    (d)-limonene flavor  09/05/2012    Benadryl [diphenhydramine hcl] Shortness Of Breath 06/08/2018    Fentanyl Itching, Nausea And Vomiting, and Swelling 09/05/2012    Imitrex [sumatriptan succinate] Shortness Of Breath 01/03/2013    Oxycodone-acetaminophen Shortness Of Breath 04/22/2015    Sulfamethoxazole-trimethoprim Anaphylaxis 09/05/2012    Topiramate Shortness Of Breath 01/03/2013    Vancomycin Anaphylaxis 09/05/2012    Butorphanol tartrate  10/25/2016    Evening primrose (oenothera biennis)  09/25/2022    Latex  11/08/2023    Pregabalin Other (See Comments) 04/03/2023    Propoxyphene n-acetaminophen  09/05/2012    Sumatriptan  11/08/2023    White petrolatum-zinc  01/23/2017    Zinc acetate  11/08/2023    Zinc oxide-white petrolatum  09/05/2012    Latex, natural rubber Itching and Rash 02/19/2013    Phenytoin Rash and Other (See Comments) 10/02/2018       [facility] Assessment Details/Treatment   Patient seen for wound care consultation to BLE.   Reviewed chart for this encounter.   See Flow Sheet for findings.      Per chart review. Tasha Hawley is a 68 y.o. female with a history of COPD (on 4L), HFpEF, chronic BLE lymphedema,adrenal insufficiency, diastolic HF, indwelling suprapubic catheter presents to the ED after having 6 falls yesterday. Wound etiology likely stemming from history of chronic lymphedema and dx of cellulitis. BLE noted to be moist pink and red in hue. Jamie pad underneath noted to have small serosanguineous drainage. Wound care changed pads which caused pt to grimace in pain. Pt denied any needs at this time. Bed in lowest position and locked. Call Mckeon within reach. Marjan Cervantes PA-C updated on leaving legs open to air. NATHAN agreed with this treatment at this time.        RECOMMENDATIONS:  Legs to be left open to air.    Recommendations made to primary team for above plan via secured chat. Wound Care to follow up. Orders placed.       Nursing to continue care.  Nursing to maintain pressure injury prevention interventions.  Contact wound care for any further questions.         05/24/24 1100   WOCN Assessment   WOCN Total Time (mins) 30   Visit Date 05/24/24   Visit Time 1100   Consult Type New   WOCN Speciality Wound   Wound cellulitis   Intervention assessed;changed;applied;chart review;orders   Teaching on-going        Altered Skin Integrity 09/06/23 1000 Right lower Leg Other (comment)   Date First Assessed/Time First Assessed: 09/06/23 1000   Altered Skin Integrity Present on Admission - Did Patient arrive to the hospital with altered skin?: yes  Side: Right  Orientation: lower  Location: Leg  Is this injury device related?: No  Prim...   Wound Image     Dressing Appearance Open to air   Drainage Amount Small   Drainage Characteristics/Odor Serosanguineous        Altered Skin Integrity 09/06/23 1000 Left lower;posterior Leg Other (comment)   Date First Assessed/Time First Assessed: 09/06/23 1000   Altered Skin Integrity Present on Admission - Did Patient arrive to the hospital with altered skin?: yes  Side: Left  Orientation: lower;posterior  Location: Leg  Is this injury device related?:...   Wound Image     Dressing Appearance Open to air   Drainage Amount Small   Drainage Characteristics/Odor Serosanguineous   Appearance Pink;Red;Moist     05/24/2024

## 2024-05-24 NOTE — ASSESSMENT & PLAN NOTE
- on amitriptyline, cymbalta  - holding in the setting of acute AMS of undetermined source   --> resume and monitor  - discontinue hydroxyzine. Not taking.

## 2024-05-24 NOTE — ASSESSMENT & PLAN NOTE
- continue synthroid -- unsure if patient has been compliant though  - TSH 26.179, T4 0.69  - consult endocrine   --> likely 2/2 noncompliance. Restart synthroid.

## 2024-05-24 NOTE — ASSESSMENT & PLAN NOTE
Presented with recurrent falls  Ongoing management of other related conditions per primary  Cellulitis diagnosed at this time  Issues with continued pain and lower blood pressures

## 2024-05-24 NOTE — ASSESSMENT & PLAN NOTE
Prior concerns for abnormal ACTH stimulation test in 2023.  Most likely degree of cortisol elevation with testing was normal in the setting of hypoalbuminemia at the time.  Has been on once nightly hydrocortisone dosing but compliance is unknown.  Also on Florinef but may have been added in setting of orthostatic hypotension concerns.     Had prior 8 am cortisol levels outpatient but unknown if these were in the setting of HC use of if doses were held prior to labs.    Plan:  - Held hydrocortisone last night  - ACTH stimulation testing this morning   - If results not concerning then HC will need to be removed from her home medication list    - No immediate concerns for primary adrenal insuffiencey which would be the only endocrine related indication to use Florinef.  Will have more evidence of AI rule out vs confirmation today when ACTH stim results return.  If concerns for orthostatic hypotension then Florinef can be helpful vs Midodrine, usually Florinef dose is higher in that setting.  Defer to primary team and outside providers on it's use.  Chronic pain medication use with pain pump could be playing a role in lower BP's

## 2024-05-24 NOTE — PROGRESS NOTES
"[facility] [address] [place] [address] Medicine  Progress Note    Patient Name: Tasha Hawley  MRN: [id number]  Patient Class: IP- Inpatient   Admission Date: 5/22/2024  Length of Stay: 1 days  Attending Physician: Rebecca Sahni MD  Primary Care Provider: Mesfin Hodges II, MD        Subjective:     Principal Problem:Bilateral cellulitis of lower leg        HPI:  Tasha Hawley is a 68 y.o. female with a history of COPD (on 4L), HFpEF, chronic BLE lymphedema,adrenal insufficiency, diastolic HF, indwelling suprapubic catheter presents to the ED after having 6 falls yesterday. Patient is a poor historian and lethargic on exam. Awakens to verbal stimuli. Oriented to person and place, but unable to provide any history on my interview. She had received IV morphine a couple hours prior to this. Per ED provider: "but reports falling onto her R shoulder multiple times yesterday a may have hit her head. Pt reports resultant R shoulder pain with movement of RUE. Additionally, pt reports having worsening BLE pain/swelling for the last few days despite compliance with Lasix 80mg TID. However, she drinks 10-12 cans of 7up daily and does not follow any low sodium diet. Discussed with pt daughter about her condition, pt was discharged from SNF recently and has since deteriorated and has not been taking care of herself. Pt daughter unsure of her mother's medication compliance. Additionally, there have been issues securing home health services due to insurance issues."    Called daughter Lisa, who reports that sometimes her mom becomes very lethargic like this. States that she is unsure if her mom is taking medication appropriately. Patient lives in a  home with her  who is in his 80s and is unable to help care for her. Lisa states that her mom was doing very well after SNF last month, but she thinks when HH came by that it took her mom too long to get to the door so they left. She has significantly declined over the " last month. Lisa is considering NH placement and would like to discuss further with SW/CM.     ED: VSS, satting well on home 3L. CBC with chronic stable anemia. CMP unremarkable. BNP 27, troponin <0.006. R shoulder xray negative for acute fracture or dislocation. EKG in NSR. CT head pending.     Overview/Hospital Course:  Tasha Hawley was placed in  observation for acute encephalopathy. Suspected due to polypharmacy, however infectious encephalopathy possible. Patient found to have significant BLE swelling with skin breakdown and erythema concerning for cellulitis. Started linezolid, ID consulted and recommending switch to IV dapto - monitoring response. Pending wound care. Fluid removal with IV diuresis, but stopped due to hypotension. Can continue home dose of PO lasix upon dc. Suspect uncontrolled hypothyroidism due to poor med compliance, resuming home dose of synthroid 150. CM assisting with dc planning - likely needing residential NH placement. Will attempt HH services in the interim.     Interval History: NAEON. This morning pt is hypotensive after diuresis x2 days. DC lasix for now. Gave small NS bolus as pt c/o lightheadedness and fatigue. BP improved and now stable. ID recommending IV dapto - will monitor pt response. Reinforce elevation. Awaiting wound care assessment today.     Review of Systems   Constitutional:  Negative for chills and fever.   Respiratory:  Negative for chest tightness and shortness of breath.    Cardiovascular:  Positive for leg swelling. Negative for chest pain.   Gastrointestinal:  Negative for abdominal pain and nausea.   Musculoskeletal:         +falls   Skin:  Positive for color change and wound.        BLE pain   Neurological:  Positive for weakness and light-headedness. Negative for dizziness.   Psychiatric/Behavioral:  Positive for sleep disturbance.      Objective:     Vital Signs (Most Recent):  Temp: 97.9 °F (36.6 °C) (05/24/24 1207)  Pulse: 60 (05/24/24 1207)  Resp:  18 (05/24/24 1207)  BP: (!) 99/56 (05/24/24 1207)  SpO2: 95 % (05/24/24 1207) Vital Signs (24h Range):  Temp:  [97.2 °F (36.2 °C)-98.4 °F (36.9 °C)] 97.9 °F (36.6 °C)  Pulse:  [55-76] 60  Resp:  [18] 18  SpO2:  [94 %-99 %] 95 %  BP: ()/(40-60) 99/56     Weight: 96.6 kg (212 lb 15.4 oz)  Body mass index is 35.44 kg/m².    Intake/Output Summary (Last 24 hours) at 5/24/2024 1212  Last data filed at 5/24/2024 0429  Gross per 24 hour   Intake --   Output 500 ml   Net -500 ml         Physical Exam  Vitals and nursing note reviewed.   Constitutional:       Appearance: She is well-developed. She is obese. She is ill-appearing.   HENT:      Head: Normocephalic and atraumatic.   Eyes:      Extraocular Movements: Extraocular movements intact.      Pupils: Pupils are equal, round, and reactive to light.   Cardiovascular:      Rate and Rhythm: Normal rate and regular rhythm.      Pulses: Normal pulses.   Pulmonary:      Effort: Pulmonary effort is normal.      Breath sounds: Normal breath sounds.      Comments: 4L NC (baseline)  Abdominal:      Palpations: Abdomen is soft.      Tenderness: There is no abdominal tenderness.   Musculoskeletal:         General: Swelling and tenderness present. Normal range of motion.      Right lower leg: Edema present.      Left lower leg: Edema present.      Comments: Significant 3+ pitting BLE edema. Poor skin integrity, circumferential. Skin breakdown bilateral posterior calves, weeping serosanguinous fluid. Erythema extending feet to upper skins.    Skin:     General: Skin is warm.      Findings: Erythema and lesion present.   Neurological:      Mental Status: She is alert and oriented to person, place, and time.   Psychiatric:         Behavior: Behavior normal.             Significant Labs: All pertinent labs within the past 24 hours have been reviewed.  CBC:   Recent Labs   Lab 05/22/24  1357 05/22/24  1720 05/23/24  0536 05/24/24  0307   WBC 5.47  --  4.63 4.50   HGB 10.6*  --  9.9*  9.7*   HCT 34.4* 36 32.2* 31.4*     --  228 210     CMP:   Recent Labs   Lab 05/22/24  1357 05/23/24  0536 05/24/24  0307    139 140   K 3.8 4.0 4.0    104 102   CO2 25 28 32*   GLU 95 95 94   BUN 7* 9 10   CREATININE 0.8 0.8 1.0   CALCIUM 9.1 8.5* 8.1*   PROT 6.5  --   --    ALBUMIN 3.0*  --   --    BILITOT 0.3  --   --    ALKPHOS 143*  --   --    AST 17  --   --    ALT 14  --   --    ANIONGAP 9 7* 6*       Significant Imaging: I have reviewed all pertinent imaging results/findings within the past 24 hours.    Assessment/Plan:      Encephalopathy, metabolic  - Patient has acute metabolic encephalopathy unsure of etiology on admission  - Labs with unremarkable   - UA, CXR, lactic and blood cultures pending   -UA noninfectious    -CXR reviewed - edema v consolidation (will assess response to diuresis)   -LA wnl    -blood cultures pending    -0/4 SIRS  - CT head without acute intracranial findings  - VBG stable - c/w COPD   - bilateral LE with redness, start emperic linezolid for suspected cellulitis   - Treat the underlying cause and monitor for improvement  - Monitor with q4 neuro checks  - Avoid narcotics and benzos that will exacerbate agitation, and use PRN anti-psychotics for controls of behavior for self harm.    - ddx: infection, uncontrolled hypothyroidism, polypharmacy, medication mismanagement, hypercapnia, worsening dementia, delirium  - Delirium precautions  --> Encephalopathy resolved 5/23  --> will address polypharmacy, continue cellulitis management, discuss dispo with family and CM      Insomnia  - HOLD amitriptyline, trazodone and seroquel as may be contributing to encephalopathy    --> resuming amitriptyline, seroquel. Decrease seroquel dose 100 -> 50 nightly.  --> DC trazodone - polypharmacy.    COPD (chronic obstructive pulmonary disease)  Patient's COPD is controlled currently.  Patient is currently off COPD Pathway. Continue Supplemental oxygen and monitor respiratory status  "closely.   - on 4L at home     Adrenal insufficiency  - continue fludrocortisone and hydrocortisone      Chronic diastolic heart failure  - CXR reviewed: "Increased parenchymal attenuation projects over the left lower lung zone, may reflect atelectasis however developing consolidation not excluded."  - BNP 27  - troponin <0.006  Results for orders placed during the hospital encounter of 05/08/24    Echo    Interpretation Summary    Left Ventricle: The left ventricle is normal in size. Normal wall thickness. There is normal systolic function. Biplane (2D) method of discs ejection fraction is 69%. Grade I diastolic dysfunction.    Right Ventricle: Normal right ventricular cavity size. Systolic function is normal. TAPSE is 3.66 cm.    Normal left atrial size. The left atrium volume index is 28.1 mL/m2.    Normal right atrial size.    The aortic valve is structurally normal. There is normal leaflet mobility.    The mitral valve is structurally normal. There is normal leaflet mobility.    The tricuspid valve is structurally normal. There is normal leaflet mobility.        Pure hypercholesterolemia  - continue statin       Lymphedema of both lower extremities  Cellulitis     - acute on chronic lymphedema   - 80mg of IV lasix given in the ED   - bilateral LE US ordered - neg for DVT  - monitor response and can continue to diureses as needed    - 2L output with 80 IV lasix x1   - decrease to 20 IV BID and monitor response --> discontinued 5/24 for hypotension  - wound care consulted  - strict I/Os   - fluid restriction   - concern for cellulitis-- start linezolid q12 hrs as she has a hx of anaphalaxis to vancomycin   - ID consulted for approval      --> switched to IV dapto per ID recs. Monitor response.      Hypothyroidism (acquired)  - continue synthroid -- unsure if patient has been compliant though  - TSH 26.179, T4 0.69  - consult endocrine   --> likely 2/2 noncompliance. Restart synthroid.    Frequent falls  Physical " deconditioning   Hemiplegia and hemiparesis following cerebral infarction affecting left non-dominant side  - recent stay in SNF, has not been able to take care of herself at home  - CM will discuss additional resources following d/c - [facility] needing [facility]  - CT head without acute findings  - fall precautions   - consider PT/OT consult but not likely a candidate as she was just discharged    Neurogenic bladder  Suprapubic catheter  - darifenacin not on formulary here, will start oxybutynin     --> discontinue, pt has catheter and is not taking med at home. Managing polypharmacy.    Severe obesity (BMI 35.0-39.9) with comorbidity  Body mass index is 35.45 kg/m². Morbid obesity complicates all aspects of disease management from diagnostic modalities to treatment. Weight loss encouraged and health benefits explained to patient.         Iron deficiency anemia  Patient's anemia is currently controlled. Has not received any PRBCs to date. Etiology likely d/t Iron deficiency  Current CBC reviewed-   Lab Results   Component Value Date    HGB 10.6 (L) 05/22/2024    HCT 34.4 (L) 05/22/2024     Monitor serial CBC and transfuse if patient becomes hemodynamically unstable, symptomatic or H/H drops below 7/21.    Sleep apnea  - CPAP QHS      Generalized anxiety disorder  - on amitriptyline, cymbalta  - holding in the setting of acute AMS of undetermined source   --> resume and monitor  - discontinue hydroxyzine. Not taking.        VTE Risk Mitigation (From admission, onward)           Ordered     enoxaparin injection 40 mg  Daily         05/22/24 1540     IP VTE HIGH RISK PATIENT  Once         05/22/24 1540                    Discharge Planning   JACKIE: 5/27/2024     Code Status: Full Code   Is the patient medically ready for discharge?:     Reason for patient still in hospital (select all that apply): Patient new problem, Patient trending condition, Laboratory test, Treatment, Consult recommendations, and  Pending disposition  Discharge Plan A: Home Health                  Marjan Cervantes PA-C  Department of Hospital Medicine   [facility] - Observation 11H

## 2024-05-24 NOTE — TELEPHONE ENCOUNTER
Called and spoke to pt.  Pt says she wants to schedule her labs after Covid-19 settles. Offered to schedule, pt says she will call to schedule at a later time.  Pt appreciated the call.   Quality 47: Advance Care Plan: Advance Care Planning discussed and documented; advance care plan or surrogate decision maker documented in the medical record. Quality 226: Preventive Care And Screening: Tobacco Use: Screening And Cessation Intervention: Patient screened for tobacco use and is an ex/non-smoker Detail Level: Detailed

## 2024-05-24 NOTE — ASSESSMENT & PLAN NOTE
Cellulitis     - acute on chronic lymphedema   - 80mg of IV lasix given in the ED   - bilateral LE US ordered - neg for DVT  - monitor response and can continue to diureses as needed    - 2L output with 80 IV lasix x1   - decrease to 20 IV BID and monitor response --> discontinued 5/24 for hypotension  - wound care consulted  - strict I/Os   - fluid restriction   - concern for cellulitis-- start linezolid q12 hrs as she has a hx of anaphalaxis to vancomycin   - ID consulted for approval      --> switched to IV dapto per ID recs. Monitor response.

## 2024-05-24 NOTE — ASSESSMENT & PLAN NOTE
Suprapubic catheter  - darifenacin not on formulary here, will start oxybutynin     --> discontinue, pt has catheter and is not taking med at home. Managing polypharmacy.

## 2024-05-24 NOTE — ASSESSMENT & PLAN NOTE
- HOLD amitriptyline, trazodone and seroquel as may be contributing to encephalopathy    --> resuming amitriptyline, seroquel. Decrease seroquel dose 100 -> 50 nightly.  --> DC trazodone - polypharmacy.

## 2024-05-24 NOTE — PLAN OF CARE
[facility]      HOME HEALTH ORDERS  FACE TO FACE ENCOUNTER    Patient Name: Tasha Hawley  YOB: 1955    PCP: Mesfin Hodges II, MD   PCP Address: [address] / [place] [address]  PCP Phone Number: [phone]  PCP Fax: [phone]    Encounter Date: 5/22/24    Admit to Home Health    Diagnoses:  Active Hospital Problems    Diagnosis  POA    *Bilateral cellulitis of lower leg [L03.116, L03.115]  Yes    COPD (chronic obstructive pulmonary disease) [J44.9]  Yes    Insomnia [G47.00]  Yes    Encephalopathy, metabolic [G93.41]  Yes    Hemiplegia and hemiparesis following cerebral infarction affecting left non-dominant side [I69.354]  Not Applicable    Adrenal insufficiency [E27.40]  Yes    Chronic venous hypertension (idiopathic) with ulcer and inflammation of bilateral lower extremity [I87.333]  Yes    Physical deconditioning [R53.81]  Yes    Pure hypercholesterolemia [E78.00]  Yes    Chronic diastolic heart failure [I50.32]  Yes    Suprapubic catheter [Z93.59]  Not Applicable     Chronic    Lymphedema of both lower extremities [I89.0]  Yes    Hypothyroidism (acquired) [E03.9]  Yes    Frequent falls [R29.6]  Not Applicable     Chronic    Neurogenic bladder [N31.9]  Yes     Chronic    Severe obesity (BMI 35.0-39.9) with comorbidity [E66.01]  Yes    Generalized anxiety disorder [F41.1]  Yes    Sleep apnea [G47.30]  Yes    Iron deficiency anemia [D50.9]  Yes      Resolved Hospital Problems   No resolved problems to display.       Follow Up Appointments:  Future Appointments   Date Time Provider Department Center   5/30/2024 11:20 AM Mesfin Hodges II, MD [facility] IM [facility]   6/12/2024 11:45 AM Nic Carranza DPM [facility] PODIA [place]   6/20/2024  9:00 AM Nick Chilel MD [facility] PUL[facility]VC [facility]   7/1/2024 11:00 AM Efe Hickey MD [facility] ENDODIA [facility]       Allergies:  Review of patient's allergies indicates:   Allergen Reactions    (d)-limonene flavor       Other reaction(s): difficult intubation  Other reaction(s): Difficulty breathing    Benadryl [diphenhydramine hcl] Shortness Of Breath    Fentanyl Itching, Nausea And Vomiting and Swelling             Imitrex [sumatriptan succinate] Shortness Of Breath    Oxycodone-acetaminophen Shortness Of Breath    Sulfamethoxazole-trimethoprim Anaphylaxis    Topiramate Shortness Of Breath    Vancomycin Anaphylaxis     Rash    Butorphanol tartrate     Evening primrose (oenothera biennis)     Latex     Pregabalin Other (See Comments)     tremors    Propoxyphene n-acetaminophen      Other reaction(s): Difficulty breathing    Sumatriptan     White petrolatum-zinc     Zinc acetate     Zinc oxide-white petrolatum      Other reaction(s): Difficulty breathing    Latex, natural rubber Itching and Rash    Phenytoin Rash and Other (See Comments)     Trouble breathing       Medications: Review discharge medications with patient and family and provide education.    Current Facility-Administered Medications   Medication Dose Route Frequency Provider Last Rate Last Admin    albuterol-ipratropium 2.5 mg-0.5 mg/3 mL nebulizer solution 3 mL  3 mL Nebulization Q4H PRN Heather Barrera PA-C        aluminum-magnesium hydroxide-simethicone 200-200-20 mg/5 mL suspension 30 mL  30 mL Oral QID PRN Heather Barrera PA-C        amitriptyline tablet 25 mg  25 mg Oral QHS Marjan Cervantes PA-C   25 mg at 05/26/24 2120    atorvastatin tablet 80 mg  80 mg Oral Daily Prerna Biswas PA-C   80 mg at 05/27/24 0947    bisacodyL suppository 10 mg  10 mg Rectal Daily PRN Heather Barrera PA-C        DAPTOmycin (CUBICIN) 775 mg in sodium chloride 0.9% SolP 50 mL IVPB  8 mg/kg Intravenous Q24H Andrey Galarza MD   Stopped at 05/26/24 1343    dextrose 10% bolus 125 mL 125 mL  12.5 g Intravenous PRN Heather Barrera PA-C        dextrose 10% bolus 250 mL 250 mL  25 g Intravenous PRN Heather Barrera PA-C        docusate sodium capsule 200 mg  200 mg Oral  QHS Prerna Biswas PA-C   200 mg at 05/26/24 2128    DULoxetine DR capsule 60 mg  60 mg Oral BID Marjan Cervantes PA-C   60 mg at 05/27/24 0957    enoxaparin injection 40 mg  40 mg Subcutaneous Daily Heather Barrera PA-C   40 mg at 05/26/24 1700    fludrocortisone (FLORINEF) split tablet 50 mcg  50 mcg Oral Daily Leoncio Villa MD   50 mcg at 05/27/24 0957    furosemide tablet 40 mg  40 mg Oral Daily Marjan Cervantes PA-C   40 mg at 05/27/24 0958    glucagon (human recombinant) injection 1 mg  1 mg Intramuscular PRN Heather Barrera PA-C        glucose chewable tablet 16 g  16 g Oral PRN Heather Barrera PA-C        glucose chewable tablet 24 g  24 g Oral PRN Heather Barrera PA-C        HYDROcodone-acetaminophen 5-325 mg per tablet 1 tablet  1 tablet Oral Q6H PRN Marjan Cervantes PA-C   1 tablet at 05/27/24 0919    hydrocortisone tablet 10 mg  10 mg Oral Daily Betito Andrade DO   10 mg at 05/27/24 0947    hydrocortisone tablet 5 mg  5 mg Oral Before dinner Betito Andrade DO   5 mg at 05/26/24 1740    levothyroxine tablet 150 mcg  150 mcg Oral Before breakfast Prerna Biswas PA-C   150 mcg at 05/27/24 0512    melatonin tablet 6 mg  6 mg Oral Nightly PRN Heather Barrera PA-C        methocarbamoL tablet 500 mg  500 mg Oral QID PRN Marjan Cervantes PA-C   500 mg at 05/26/24 1309    ondansetron disintegrating tablet 8 mg  8 mg Oral Q8H PRN Heather Barrera PA-C   8 mg at 05/25/24 1310    pantoprazole EC tablet 40 mg  40 mg Oral Daily Prerna Biswas PA-C   40 mg at 05/27/24 0958    polyethylene glycol packet 17 g  17 g Oral BID PRN Heather Barrera PA-C        prochlorperazine injection Soln 5 mg  5 mg Intravenous Q6H PRN Heather Barrera PA-C        QUEtiapine tablet 50 mg  50 mg Oral QHS Marjan Cervantes PA-C   50 mg at 05/26/24 2120    senna tablet 2 tablet  2 tablet Oral BID Prerna Biswas PA-C   2 tablet at 05/27/24 0947    simethicone chewable tablet 80 mg  1 tablet Oral QID PRN  Heather Barrera PA-C        sodium chloride 0.9% flush 5 mL  5 mL Intravenous PRN Heather Barrera PA-C        tiotropium bromide 2.5 mcg/actuation inhaler 2 puff  2 puff Inhalation Daily Prerna Biswas PA-C   2 puff at 05/27/24 0811     Current Discharge Medication List        START taking these medications    Details   acetaminophen (TYLENOL) 500 MG tablet Take 2 tablets (1,000 mg total) by mouth every 12 (twelve) hours as needed for Pain.  Qty: 40 tablet, Refills: 0      doxycycline (VIBRAMYCIN) 100 MG Cap Take 1 capsule (100 mg total) by mouth every 12 (twelve) hours. for 10 days  Qty: 20 capsule, Refills: 0      methocarbamoL (ROBAXIN) 500 MG Tab Take 1 tablet (500 mg total) by mouth 4 (four) times daily as needed (pain).  Qty: 30 tablet, Refills: 0           CONTINUE these medications which have CHANGED    Details   furosemide (LASIX) 40 MG tablet Take 1 tablet (40 mg total) by mouth once daily.  Qty: 90 tablet, Refills: 1      !! hydrocortisone (CORTEF) 10 MG Tab Take 1 tablet (10 mg total) by mouth once daily. for 10 days  Qty: 10 tablet, Refills: 0      !! hydrocortisone (CORTEF) 5 MG Tab Take 1 tablet (5 mg total) by mouth before dinner. for 10 days  Qty: 10 tablet, Refills: 0      QUEtiapine (SEROQUEL) 50 MG tablet Take 1 tablet (50 mg total) by mouth every evening. TAKE 1 TABLET (100 MG) BY MOUTH NIGHTLY  Qty: 60 tablet, Refills: 0    Associated Diagnoses: Insomnia due to medical condition      tiotropium bromide (SPIRIVA RESPIMAT) 2.5 mcg/actuation inhaler Inhale 2 puffs into the lungs once daily. Controller. Please restart taking.  Qty: 4 g, Refills: 1       !! - Potential duplicate medications found. Please discuss with provider.        CONTINUE these medications which have NOT CHANGED    Details   amitriptyline (ELAVIL) 25 MG tablet Take 1 tablet (25 mg total) by mouth every evening.  Qty: 90 tablet, Refills: 0      atorvastatin (LIPITOR) 80 MG tablet Take 1 tablet (80 mg total) by mouth once  daily.  Qty: 90 tablet, Refills: 0    Associated Diagnoses: Mixed hyperlipidemia      butalbital-acetaminophen-caffeine -40 mg (FIORICET, ESGIC) -40 mg per tablet Take 1 tablet by mouth daily as needed for Headaches.      calcium-vitamin D3 (OS-JACKIE 500 + D3) 500 mg-5 mcg (200 unit) per tablet Take 2 tablets by mouth 2 (two) times daily.  Qty: 120 tablet, Refills: 11      cyanocobalamin, vitamin B-12, 5,000 mcg Subl Place 1 tablet under the tongue once daily.      docusate sodium (COLACE) 100 MG capsule Take 200 mg by mouth every evening.      DULoxetine (CYMBALTA) 60 MG capsule Take 1 capsule (60 mg total) by mouth 2 (two) times daily.  Qty: 180 capsule, Refills: 0    Associated Diagnoses: Intractable pain; Chronic pain of both feet      fludrocortisone (FLORINEF) 0.1 mg Tab Take a half-tablet (50 mcg) by mouth once daily  Qty: 15 tablet, Refills: 5      HYDROcodone-acetaminophen (NORCO)  mg per tablet Take 1 tablet by mouth every 6 (six) hours as needed for Pain.  Qty: 30 tablet, Refills: 0    Comments: Quantity prescribed more than 7 day supply? No  Associated Diagnoses: Closed multiple fractures of right foot with delayed healing, subsequent encounter      intrathecal pain pump compound Hydromorphone (7.5 mg/mL) infusion at 8.59 mg/day (0.3578 mg/hr) out of a total reservoir volume of 40 mL  Pump filled monthly      levothyroxine (SYNTHROID) 150 MCG tablet Take 1 tablet (150 mcg total) by mouth before breakfast.  Qty: 90 tablet, Refills: 0    Associated Diagnoses: Hypothyroidism (acquired)      pantoprazole (PROTONIX) 40 MG tablet Take 1 tablet (40 mg total) by mouth once daily.  Qty: 90 tablet, Refills: 0    Associated Diagnoses: Gastroesophageal reflux disease, unspecified whether esophagitis present      senna (SENNA LAX) 8.6 mg tablet Take 2 tablets by mouth 2 (two) times daily.      aspirin (ECOTRIN) 81 MG EC tablet Take 1 tablet (81 mg total) by mouth once daily.  Qty: 90 tablet, Refills:  3    Associated Diagnoses: Chronic pain syndrome      nitroGLYCERIN (NITROSTAT) 0.4 MG SL tablet Place 0.4 mg under the tongue every 5 (five) minutes as needed for Chest pain.           STOP taking these medications       potassium chloride (MICRO-K) 10 MEQ CpSR Comments:   Reason for Stopping:         traZODone (DESYREL) 300 MG tablet Comments:   Reason for Stopping:         darifenacin (ENABLEX) 7.5 MG Tb24 Comments:   Reason for Stopping:         hydrOXYzine (ATARAX) 50 MG tablet Comments:   Reason for Stopping:                 I have seen and examined this patient within the last 30 days. My clinical findings that support the need for the home health skilled services and home bound status are the following:no   Weakness/numbness causing balance and gait disturbance due to Heart Failure, Infection, Weakness/Debility, and lymphedema making it taxing to leave home.  Requiring assistive device to leave home due to unsteady gait caused by  Heart Failure, Infection, Weakness/Debility, and lymphedema.  Patient with medication mismanagement issues requiring home bound status as evidenced by  Unstable vital signs (blood pressure, heart rate), Poor understanding of medication regimen/dosage, and Poor adherence to medication regimen/dosage.     Diet:   cardiac diet    Labs:  Report Lab results to PCP.    Referrals/ Consults  Physical Therapy to evaluate and treat. Evaluate for home safety and equipment needs; Establish/upgrade home exercise program. Perform / instruct on therapeutic exercises, gait training, transfer training, and Range of Motion.  Occupational Therapy to evaluate and treat. Evaluate home environment for safety and equipment needs. Perform/Instruct on transfers, ADL training, ROM, and therapeutic exercises.  Aide to provide assistance with personal care, ADLs, and vital signs.    Activities:   activity as tolerated    Nursing:   Agency to admit patient within 24 hours of hospital discharge unless specified  on physician order or at patient request    SN to complete comprehensive assessment including routine vital signs. Instruct on disease process and s/s of complications to report to MD. Review/verify medication list sent home with the patient at time of discharge  and instruct patient/caregiver as needed. Frequency may be adjusted depending on start of care date.     Skilled nurse to perform up to 3 visits PRN for symptoms related to diagnosis    Notify MD if SBP > 160 or < 90; DBP > 90 or < 50; HR > 120 or < 50; Temp > 101; O2 < 88%    Ok to schedule additional visits based on staff availability and patient request on consecutive days within the home health episode.    When multiple disciplines ordered:    Start of Care occurs on Sunday - Wednesday schedule remaining discipline evaluations as ordered on separate consecutive days following the start of care.    Thursday SOC -schedule subsequent evaluations Friday and Monday the following week.     Friday - Saturday SOC - schedule subsequent discipline evaluations on consecutive days starting Monday of the following week.    For all post-discharge communication and subsequent orders please contact patient's primary care physician.     Miscellaneous   Motley Care: Instruct patient/caregiver to empty Motley bag.  Change Motley every month.  Routine Skin for Bedridden Patients: Instruct patient/caregiver to apply moisture barrier cream to all skin folds and wet areas in perineal area daily and after baths and all bowel movements.    Wound care: Skin breakdown to posterior lower legs bilaterally, 2/2 chronic lymphedema.   - Apply non-stick dressing to posterior leg wounds, covered by light gauze wrap. Replace dressings daily and as needed.   - Consult wound care; May adjust wound care recommendations/ routine as appropriate    Home Health Aide:  Nursing Three times weekly, Physical Therapy Three times weekly, Occupational Therapy Three times weekly, and Home Health Aide  Three times weekly, and outpatient case management weekly    Wound Care Orders  yes:  Venous stasis ulcers/ skin breakdown bilateral lower extremities.      I certify that this patient is confined to her home and needs intermittent skilled nursing care, physical therapy, and occupational therapy.

## 2024-05-24 NOTE — SUBJECTIVE & OBJECTIVE
Interval History: NAEON. This morning pt is hypotensive after diuresis x2 days. DC lasix for now. Gave small NS bolus as pt c/o lightheadedness and fatigue. BP improved and now stable. ID recommending IV dapto - will monitor pt response. Reinforce elevation. Awaiting wound care assessment today.     Review of Systems   Constitutional:  Negative for chills and fever.   Respiratory:  Negative for chest tightness and shortness of breath.    Cardiovascular:  Positive for leg swelling. Negative for chest pain.   Gastrointestinal:  Negative for abdominal pain and nausea.   Musculoskeletal:         +falls   Skin:  Positive for color change and wound.        BLE pain   Neurological:  Positive for weakness and light-headedness. Negative for dizziness.   Psychiatric/Behavioral:  Positive for sleep disturbance.      Objective:     Vital Signs (Most Recent):  Temp: 97.9 °F (36.6 °C) (05/24/24 1207)  Pulse: 60 (05/24/24 1207)  Resp: 18 (05/24/24 1207)  BP: (!) 99/56 (05/24/24 1207)  SpO2: 95 % (05/24/24 1207) Vital Signs (24h Range):  Temp:  [97.2 °F (36.2 °C)-98.4 °F (36.9 °C)] 97.9 °F (36.6 °C)  Pulse:  [55-76] 60  Resp:  [18] 18  SpO2:  [94 %-99 %] 95 %  BP: ()/(40-60) 99/56     Weight: 96.6 kg (212 lb 15.4 oz)  Body mass index is 35.44 kg/m².    Intake/Output Summary (Last 24 hours) at 5/24/2024 1212  Last data filed at 5/24/2024 0429  Gross per 24 hour   Intake --   Output 500 ml   Net -500 ml         Physical Exam  Vitals and nursing note reviewed.   Constitutional:       Appearance: She is well-developed. She is obese. She is ill-appearing.   HENT:      Head: Normocephalic and atraumatic.   Eyes:      Extraocular Movements: Extraocular movements intact.      Pupils: Pupils are equal, round, and reactive to light.   Cardiovascular:      Rate and Rhythm: Normal rate and regular rhythm.      Pulses: Normal pulses.   Pulmonary:      Effort: Pulmonary effort is normal.      Breath sounds: Normal breath sounds.       Comments: 4L NC (baseline)  Abdominal:      Palpations: Abdomen is soft.      Tenderness: There is no abdominal tenderness.   Musculoskeletal:         General: Swelling and tenderness present. Normal range of motion.      Right lower leg: Edema present.      Left lower leg: Edema present.      Comments: Significant 3+ pitting BLE edema. Poor skin integrity, circumferential. Skin breakdown bilateral posterior calves, weeping serosanguinous fluid. Erythema extending feet to upper skins.    Skin:     General: Skin is warm.      Findings: Erythema and lesion present.   Neurological:      Mental Status: She is alert and oriented to person, place, and time.   Psychiatric:         Behavior: Behavior normal.             Significant Labs: All pertinent labs within the past 24 hours have been reviewed.  CBC:   Recent Labs   Lab 05/22/24  1357 05/22/24  1720 05/23/24  0536 05/24/24  0307   WBC 5.47  --  4.63 4.50   HGB 10.6*  --  9.9* 9.7*   HCT 34.4* 36 32.2* 31.4*     --  228 210     CMP:   Recent Labs   Lab 05/22/24  1357 05/23/24  0536 05/24/24  0307    139 140   K 3.8 4.0 4.0    104 102   CO2 25 28 32*   GLU 95 95 94   BUN 7* 9 10   CREATININE 0.8 0.8 1.0   CALCIUM 9.1 8.5* 8.1*   PROT 6.5  --   --    ALBUMIN 3.0*  --   --    BILITOT 0.3  --   --    ALKPHOS 143*  --   --    AST 17  --   --    ALT 14  --   --    ANIONGAP 9 7* 6*       Significant Imaging: I have reviewed all pertinent imaging results/findings within the past 24 hours.

## 2024-05-24 NOTE — PROGRESS NOTES
"[facility]  Infectious Disease  Progress Note    Patient Name: Tasha Hawley  MRN: [id number]  Admission Date: 5/22/2024  Length of Stay: 1 days  Attending Physician: Rebecca Sahni MD  Primary Care Provider: Mesfin Hodges II, MD    Isolation Status: No active isolations    Assessment/Plan:      ID  * Bilateral cellulitis of lower leg  67 yo woman with chronic lymphedema, admitted with bilateral cellulitis and skin breakdown. Course complicated by likely venous stasis dermatitis.    Slight improvement in edema but her legs are still not elevated enough, slowly her progress.    Recommendations  Would ensure adequate elevation (ankles>knees.hips)  Continue daptomycin (CK WNL)  Awaiting wound care      Andrey Galarza MD  Infectious Disease  [facility]    Subjective:     Principal Problem:Bilateral cellulitis of lower leg    HPI: Ms. Hawley is a pleasant 68 y.o. woman admitted with cellulitis of her legs due to chronic edema. She has COPD (on 4L), HFpEF, chronic BLE lymphedema,adrenal insufficiency, diastolic HF, indwelling suprapubic catheter presents to the ED after having 6 falls. She tells me that her pump was turned doen and that her legs started to swell. When she looked down, she saw that they were very red. Her falls were due, in part, to her lymphedema. She was evaluated in the ED and started on Zyvox for cellulitis. ID is consulted for "On linezolid d/t hx of anaphylaxis from vancomycin."    Lives at home with . Daughter at bedside provided additional history.    Interval History: Legs maybe a bit improved (but not elevated enough). No fever. BP low so pain meds held.    Review of Systems   Constitutional:  Negative for chills and fever.   Cardiovascular:  Positive for leg swelling.   Skin:  Positive for color change.   All other systems reviewed and are negative.    Objective:     Vital Signs (Most Recent):  Temp: 97.7 °F (36.5 °C) (05/24/24 0807)  Pulse: " (!) 58 (05/24/24 0807)  Resp: 18 (05/24/24 0807)  BP: (!) 80/42 (05/24/24 0831)  SpO2: 95 % (05/24/24 0807) Vital Signs (24h Range):  Temp:  [97.2 °F (36.2 °C)-98.4 °F (36.9 °C)] 97.7 °F (36.5 °C)  Pulse:  [55-76] 58  Resp:  [18] 18  SpO2:  [94 %-99 %] 95 %  BP: ()/(40-60) 80/42     Weight: 96.6 kg (212 lb 15.4 oz)  Body mass index is 35.44 kg/m².    Estimated Creatinine Clearance: 62.7 mL/min (based on SCr of 1 mg/dL).     Physical Exam  Vitals and nursing note reviewed.   Constitutional:       General: She is not in acute distress.     Appearance: Normal appearance. She is not ill-appearing, toxic-appearing or diaphoretic.   HENT:      Head: Normocephalic and atraumatic.   Eyes:      Extraocular Movements: Extraocular movements intact.      Pupils: Pupils are equal, round, and reactive to light.   Musculoskeletal:         General: Swelling present.      Right lower leg: Edema present.      Left lower leg: Edema present.   Skin:     Findings: Erythema present.   Neurological:      General: No focal deficit present.      Mental Status: She is alert and oriented to person, place, and time.   Psychiatric:         Mood and Affect: Mood normal.         Behavior: Behavior normal.         Thought Content: Thought content normal.         Judgment: Judgment normal.          Significant Labs: BMP:   Recent Labs   Lab 05/24/24  0307   GLU 94      K 4.0      CO2 32*   BUN 10   CREATININE 1.0   CALCIUM 8.1*   MG 1.8     CBC:   Recent Labs   Lab 05/22/24  1357 05/22/24  1720 05/23/24  0536 05/24/24  0307   WBC 5.47  --  4.63 4.50   HGB 10.6*  --  9.9* 9.7*   HCT 34.4* 36 32.2* 31.4*     --  228 210     Microbiology Results (last 7 days)       Procedure Component Value Units Date/Time    Blood culture [[id number]]     Order Status: Sent Specimen: Blood     Blood culture [[id number]]     Order Status: Sent Specimen: Blood             Significant Imaging: I have reviewed all pertinent imaging  results/findings within the past 24 hours.

## 2024-05-24 NOTE — PLAN OF CARE
05/24/24 1136   Post-Acute Status   Post-Acute Authorization Home Health   Home Health Status Referrals Sent     Pt is expected to discharge home with home health once she is medically stable. [facility] sent referrals via [facility] and is waiting for an accepting agency. Providers that are owned, operated, or affiliated with [facility] are included on the list.    Discharge Plan A and Plan B have been determined by review of patient's clinical status, future medical and therapeutic needs, and coverage/benefits for post-acute care in coordination with multidisciplinary team members.    [facility] will continue to follow up.    UPDATE    Pt has been denied by all agencies referral was sent to. [facility] sent an email to leadership regarding pt having no accepting agency.      Cher Guo LMSW  [facility]  Ext. [phone]

## 2024-05-24 NOTE — SUBJECTIVE & OBJECTIVE
Interval History: Legs maybe a bit improved (but not elevated enough). No fever. BP low so pain meds held.    Review of Systems   Constitutional:  Negative for chills and fever.   Cardiovascular:  Positive for leg swelling.   Skin:  Positive for color change.   All other systems reviewed and are negative.    Objective:     Vital Signs (Most Recent):  Temp: 97.7 °F (36.5 °C) (05/24/24 0807)  Pulse: (!) 58 (05/24/24 0807)  Resp: 18 (05/24/24 0807)  BP: (!) 80/42 (05/24/24 0831)  SpO2: 95 % (05/24/24 0807) Vital Signs (24h Range):  Temp:  [97.2 °F (36.2 °C)-98.4 °F (36.9 °C)] 97.7 °F (36.5 °C)  Pulse:  [55-76] 58  Resp:  [18] 18  SpO2:  [94 %-99 %] 95 %  BP: ()/(40-60) 80/42     Weight: 96.6 kg (212 lb 15.4 oz)  Body mass index is 35.44 kg/m².    Estimated Creatinine Clearance: 62.7 mL/min (based on SCr of 1 mg/dL).     Physical Exam  Vitals and nursing note reviewed.   Constitutional:       General: She is not in acute distress.     Appearance: Normal appearance. She is not ill-appearing, toxic-appearing or diaphoretic.   HENT:      Head: Normocephalic and atraumatic.   Eyes:      Extraocular Movements: Extraocular movements intact.      Pupils: Pupils are equal, round, and reactive to light.   Musculoskeletal:         General: Swelling present.      Right lower leg: Edema present.      Left lower leg: Edema present.   Skin:     Findings: Erythema present.   Neurological:      General: No focal deficit present.      Mental Status: She is alert and oriented to person, place, and time.   Psychiatric:         Mood and Affect: Mood normal.         Behavior: Behavior normal.         Thought Content: Thought content normal.         Judgment: Judgment normal.          Significant Labs: BMP:   Recent Labs   Lab 05/24/24  0307   GLU 94      K 4.0      CO2 32*   BUN 10   CREATININE 1.0   CALCIUM 8.1*   MG 1.8     CBC:   Recent Labs   Lab 05/22/24  1357 05/22/24  1720 05/23/24  0536 05/24/24  0307   WBC 5.47   --  4.63 4.50   HGB 10.6*  --  9.9* 9.7*   HCT 34.4* 36 32.2* 31.4*     --  228 210     Microbiology Results (last 7 days)       Procedure Component Value Units Date/Time    Blood culture [[id number]]     Order Status: Sent Specimen: Blood     Blood culture [[id number]]     Order Status: Sent Specimen: Blood             Significant Imaging: I have reviewed all pertinent imaging results/findings within the past 24 hours.

## 2024-05-24 NOTE — NURSING
Patient eating late supper , awaiting a line placement to complete Daptomycin , update given  to patient and family, unable to locate a feasible vein .

## 2024-05-24 NOTE — SUBJECTIVE & OBJECTIVE
"Interval HPI:   Overnight events:  Not feeling well this morning and having pain but with lower blood pressures.  Steroid dose held last night with plans for ACTH stim test this morning.  Reviewed timing and need for labs with nursing staff at bedside.  No acute events noted overnight.    BP (!) 80/42 (BP Location: Right arm, Patient Position: Sitting)   Pulse (!) 58   Temp 97.7 °F (36.5 °C) (Tympanic)   Resp 18   Ht 5' 5" (1.651 m)   Wt 96.6 kg (212 lb 15.4 oz)   LMP  (LMP Unknown)   SpO2 95%   Breastfeeding No   BMI 35.44 kg/m²     Labs Reviewed and Include    Recent Labs   Lab 05/24/24  0307   GLU 94   CALCIUM 8.1*      K 4.0   CO2 32*      BUN 10   CREATININE 1.0     Lab Results   Component Value Date    WBC 4.50 05/24/2024    HGB 9.7 (L) 05/24/2024    HCT 31.4 (L) 05/24/2024    MCV 89 05/24/2024     05/24/2024     Recent Labs   Lab 05/22/24  1357   TSH 26.179*   FREET4 0.69*     Lab Results   Component Value Date    HGBA1C 5.3 05/03/2024       Nutritional status:   Body mass index is 35.44 kg/m².  Lab Results   Component Value Date    ALBUMIN 3.0 (L) 05/22/2024    ALBUMIN 3.6 05/16/2024    ALBUMIN 3.6 05/08/2024     Lab Results   Component Value Date    PREALBUMIN 13 (L) 02/02/2017       Estimated Creatinine Clearance: 62.7 mL/min (based on SCr of 1 mg/dL).    Accu-Checks  Recent Labs     05/23/24  2020   POCTGLUCOSE 87       Current Medications and/or Treatments Impacting Glycemic Control  Immunotherapy:    Immunosuppressants       None          Steroids:   Hormones (From admission, onward)      Start     Stop Route Frequency Ordered    05/24/24 1030  cosyntropin injection 0.25 mg         -- IV Once 05/24/24 0926    05/22/24 2100  fludrocortisone (FLORINEF) split tablet 50 mcg         -- Oral Daily 05/22/24 1817    05/22/24 1634  melatonin tablet 6 mg         -- Oral Nightly PRN 05/22/24 1540          Pressors:    Autonomic Drugs (From admission, onward)      None      "     Hyperglycemia/Diabetes Medications:   Antihyperglycemics (From admission, onward)      None

## 2024-05-24 NOTE — ASSESSMENT & PLAN NOTE
69 yo woman with chronic lymphedema, admitted with bilateral cellulitis and skin breakdown. Course complicated by likely venous stasis dermatitis.    Slight improvement in edema but her legs are still not elevated enough, slowly her progress.    Recommendations  Would ensure adequate elevation (ankles>knees.hips)  Continue daptomycin (CK WNL)  Awaiting wound care

## 2024-05-25 ENCOUNTER — PATIENT MESSAGE (OUTPATIENT)
Dept: ENDOCRINOLOGY | Facility: CLINIC | Age: 69
End: 2024-05-25
Payer: MEDICARE

## 2024-05-25 ENCOUNTER — TELEPHONE (OUTPATIENT)
Dept: ENDOCRINOLOGY | Facility: HOSPITAL | Age: 69
End: 2024-05-25
Payer: MEDICARE

## 2024-05-25 DIAGNOSIS — E03.9 HYPOTHYROIDISM (ACQUIRED): Primary | ICD-10-CM

## 2024-05-25 DIAGNOSIS — E27.40 ADRENAL INSUFFICIENCY: ICD-10-CM

## 2024-05-25 LAB
ANION GAP SERPL CALC-SCNC: 6 MMOL/L (ref 8–16)
BUN SERPL-MCNC: 7 MG/DL (ref 8–23)
CALCIUM SERPL-MCNC: 8.6 MG/DL (ref 8.7–10.5)
CHLORIDE SERPL-SCNC: 101 MMOL/L (ref 95–110)
CO2 SERPL-SCNC: 31 MMOL/L (ref 23–29)
CREAT SERPL-MCNC: 0.9 MG/DL (ref 0.5–1.4)
ERYTHROCYTE [DISTWIDTH] IN BLOOD BY AUTOMATED COUNT: 13.6 % (ref 11.5–14.5)
EST. GFR  (NO RACE VARIABLE): >60 ML/MIN/1.73 M^2
GLUCOSE SERPL-MCNC: 140 MG/DL (ref 70–110)
HCT VFR BLD AUTO: 33 % (ref 37–48.5)
HGB BLD-MCNC: 10 G/DL (ref 12–16)
MAGNESIUM SERPL-MCNC: 1.9 MG/DL (ref 1.6–2.6)
MCH RBC QN AUTO: 27.6 PG (ref 27–31)
MCHC RBC AUTO-ENTMCNC: 30.3 G/DL (ref 32–36)
MCV RBC AUTO: 91 FL (ref 82–98)
PLATELET # BLD AUTO: 214 K/UL (ref 150–450)
PMV BLD AUTO: 9.9 FL (ref 9.2–12.9)
POTASSIUM SERPL-SCNC: 4.4 MMOL/L (ref 3.5–5.1)
RBC # BLD AUTO: 3.62 M/UL (ref 4–5.4)
SODIUM SERPL-SCNC: 138 MMOL/L (ref 136–145)
WBC # BLD AUTO: 4.79 K/UL (ref 3.9–12.7)

## 2024-05-25 PROCEDURE — 25000003 PHARM REV CODE 250: Mod: HCNC

## 2024-05-25 PROCEDURE — 83735 ASSAY OF MAGNESIUM: CPT | Mod: HCNC

## 2024-05-25 PROCEDURE — 36415 COLL VENOUS BLD VENIPUNCTURE: CPT | Mod: HCNC

## 2024-05-25 PROCEDURE — 27000221 HC OXYGEN, UP TO 24 HOURS: Mod: HCNC

## 2024-05-25 PROCEDURE — 99233 SBSQ HOSP IP/OBS HIGH 50: CPT | Mod: HCNC,,, | Performed by: INTERNAL MEDICINE

## 2024-05-25 PROCEDURE — 25000003 PHARM REV CODE 250: Mod: HCNC | Performed by: INTERNAL MEDICINE

## 2024-05-25 PROCEDURE — 99232 SBSQ HOSP IP/OBS MODERATE 35: CPT | Mod: HCNC,,, | Performed by: INTERNAL MEDICINE

## 2024-05-25 PROCEDURE — 25000003 PHARM REV CODE 250: Mod: HCNC | Performed by: NURSE PRACTITIONER

## 2024-05-25 PROCEDURE — 21400001 HC TELEMETRY ROOM: Mod: HCNC

## 2024-05-25 PROCEDURE — 63600175 PHARM REV CODE 636 W HCPCS: Mod: HCNC | Performed by: INTERNAL MEDICINE

## 2024-05-25 PROCEDURE — 85027 COMPLETE CBC AUTOMATED: CPT | Mod: HCNC

## 2024-05-25 PROCEDURE — 63600175 PHARM REV CODE 636 W HCPCS: Mod: HCNC

## 2024-05-25 PROCEDURE — 94761 N-INVAS EAR/PLS OXIMETRY MLT: CPT | Mod: HCNC

## 2024-05-25 PROCEDURE — 80048 BASIC METABOLIC PNL TOTAL CA: CPT | Mod: HCNC

## 2024-05-25 PROCEDURE — 25000003 PHARM REV CODE 250: Mod: HCNC | Performed by: STUDENT IN AN ORGANIZED HEALTH CARE EDUCATION/TRAINING PROGRAM

## 2024-05-25 RX ORDER — DIPHENHYDRAMINE HCL 25 MG
25 CAPSULE ORAL ONCE
Status: COMPLETED | OUTPATIENT
Start: 2024-05-25 | End: 2024-05-25

## 2024-05-25 RX ORDER — COSYNTROPIN 0.25 MG/ML
0.25 INJECTION, POWDER, FOR SOLUTION INTRAMUSCULAR; INTRAVENOUS ONCE
Status: DISCONTINUED | OUTPATIENT
Start: 2024-05-25 | End: 2024-05-27 | Stop reason: HOSPADM

## 2024-05-25 RX ORDER — FUROSEMIDE 40 MG/1
40 TABLET ORAL DAILY
Status: DISCONTINUED | OUTPATIENT
Start: 2024-05-25 | End: 2024-05-29 | Stop reason: HOSPADM

## 2024-05-25 RX ADMIN — DAPTOMYCIN 775 MG: 350 INJECTION, POWDER, LYOPHILIZED, FOR SOLUTION INTRAVENOUS at 02:05

## 2024-05-25 RX ADMIN — HYDROCORTISONE 5 MG: 5 TABLET ORAL at 05:05

## 2024-05-25 RX ADMIN — ONDANSETRON 8 MG: 8 TABLET, ORALLY DISINTEGRATING ORAL at 01:05

## 2024-05-25 RX ADMIN — SENNOSIDES 2 TABLET: 8.6 TABLET, FILM COATED ORAL at 09:05

## 2024-05-25 RX ADMIN — FLUDROCORTISONE ACETATE 50 MCG: 0.1 TABLET ORAL at 11:05

## 2024-05-25 RX ADMIN — AMITRIPTYLINE HYDROCHLORIDE 25 MG: 25 TABLET, FILM COATED ORAL at 09:05

## 2024-05-25 RX ADMIN — ACETAMINOPHEN 1000 MG: 325 TABLET ORAL at 01:05

## 2024-05-25 RX ADMIN — ENOXAPARIN SODIUM 40 MG: 40 INJECTION SUBCUTANEOUS at 05:05

## 2024-05-25 RX ADMIN — HYDROCORTISONE 10 MG: 5 TABLET ORAL at 08:05

## 2024-05-25 RX ADMIN — TRAZODONE HYDROCHLORIDE 150 MG: 50 TABLET ORAL at 10:05

## 2024-05-25 RX ADMIN — ATORVASTATIN CALCIUM 80 MG: 40 TABLET, FILM COATED ORAL at 08:05

## 2024-05-25 RX ADMIN — KETOROLAC TROMETHAMINE 15 MG: 15 INJECTION, SOLUTION INTRAMUSCULAR; INTRAVENOUS at 03:05

## 2024-05-25 RX ADMIN — DIPHENHYDRAMINE HYDROCHLORIDE 25 MG: 25 CAPSULE ORAL at 09:05

## 2024-05-25 RX ADMIN — OXYBUTYNIN CHLORIDE 5 MG: 5 TABLET, EXTENDED RELEASE ORAL at 08:05

## 2024-05-25 RX ADMIN — PANTOPRAZOLE SODIUM 40 MG: 40 TABLET, DELAYED RELEASE ORAL at 08:05

## 2024-05-25 RX ADMIN — DOCUSATE SODIUM 200 MG: 50 CAPSULE, LIQUID FILLED ORAL at 09:05

## 2024-05-25 RX ADMIN — DULOXETINE HYDROCHLORIDE 60 MG: 30 CAPSULE, DELAYED RELEASE ORAL at 09:05

## 2024-05-25 RX ADMIN — ACETAMINOPHEN 1000 MG: 325 TABLET ORAL at 05:05

## 2024-05-25 RX ADMIN — ACETAMINOPHEN 1000 MG: 325 TABLET ORAL at 09:05

## 2024-05-25 RX ADMIN — DULOXETINE HYDROCHLORIDE 60 MG: 30 CAPSULE, DELAYED RELEASE ORAL at 08:05

## 2024-05-25 RX ADMIN — QUETIAPINE FUMARATE 50 MG: 25 TABLET ORAL at 09:05

## 2024-05-25 RX ADMIN — KETOROLAC TROMETHAMINE 15 MG: 15 INJECTION, SOLUTION INTRAMUSCULAR; INTRAVENOUS at 05:05

## 2024-05-25 RX ADMIN — LEVOTHYROXINE SODIUM 150 MCG: 150 TABLET ORAL at 05:05

## 2024-05-25 RX ADMIN — FUROSEMIDE 40 MG: 40 TABLET ORAL at 01:05

## 2024-05-25 NOTE — ASSESSMENT & PLAN NOTE
Prior concerns for abnormal ACTH stimulation test in 2023.  Most likely degree of cortisol elevation with testing was normal in the setting of hypoalbuminemia at the time.  Has been on once nightly hydrocortisone dosing but compliance is unknown.  Also on Florinef but may have been added in setting of orthostatic hypotension concerns.     Had prior 8 am cortisol levels outpatient but unknown if these were in the setting of HC use of if doses were held prior to labs.    ACTH stim testing on 5/24 but timing of labs were an issue so unable to confirm or rule out AI.  Baseline cortisol before cosyntropin was 9.9 which is a good value for mid day at a 1 pm lab draw.  Still unlikely to be AI but further testing needed.      Plan:  - Restarted on hydrocortisone given lab issues   - Continue 10 mg HC every morning   - Continue 5 mg HC early afternoon (between 2 and 4 pm daily)    - ACTH stimulation testing will be done again in the coming weeks, but outpatient in the endocrine clinic after discharge  - Will need to have patient hold he PM doses of HC the day before future ACTH stim testing    - No immediate concerns for primary adrenal insuffiencey which would be the only endocrine related indication to use Florinef.  If concerns for orthostatic hypotension then Florinef can be helpful vs Midodrine, usually Florinef dose is higher in that setting.  Defer to primary team and outside providers on it's use.  Chronic pain medication use with pain pump could be playing a role in lower BP's.  No need for Florinef from our eyes.

## 2024-05-25 NOTE — TELEPHONE ENCOUNTER
Plans will be for an ACTH Stimulation test to be performed outpatient prior to the endocrine visit in July 2024.  We will also check the TSH level on those labs.      Orders placed today for this purpose.

## 2024-05-25 NOTE — ASSESSMENT & PLAN NOTE
Presented with recurrent falls  Ongoing management of other related conditions per primary  Cellulitis diagnosed at this time  Issues with continued pain and lower blood pressures at times

## 2024-05-25 NOTE — PLAN OF CARE
Problem: Adult Inpatient Plan of Care  Goal: Plan of Care Review  5/25/2024 0456 by Nicolette Conn LPN  Outcome: Progressing  5/25/2024 0456 by Nicolette Conn LPN  Outcome: Progressing     Problem: Infection  Goal: Absence of Infection Signs and Symptoms  Outcome: Progressing     Problem: Wound  Goal: Optimal Coping  5/25/2024 0456 by Nicolette Conn LPN  Outcome: Progressing  5/25/2024 0456 by Nicolette Conn LPN  Outcome: Progressing     Problem: Skin Injury Risk Increased  Goal: Skin Health and Integrity  5/25/2024 0456 by Nicolette Conn LPN  Outcome: Progressing  5/25/2024 0456 by Nicolette Conn LPN  Outcome: Progressing

## 2024-05-25 NOTE — PROGRESS NOTES
"[facility] [address] [place] [address] Medicine  Progress Note    Patient Name: Tasha Hawley  MRN: [id number]  Patient Class: IP- Inpatient   Admission Date: 5/22/2024  Length of Stay: 2 days  Attending Physician: Rebecca Sahni MD  Primary Care Provider: Mesfin Hodges II, MD        Subjective:     Principal Problem:Bilateral cellulitis of lower leg        HPI:  Tasha Hawley is a 68 y.o. female with a history of COPD (on 4L), HFpEF, chronic BLE lymphedema,adrenal insufficiency, diastolic HF, indwelling suprapubic catheter presents to the ED after having 6 falls yesterday. Patient is a poor historian and lethargic on exam. Awakens to verbal stimuli. Oriented to person and place, but unable to provide any history on my interview. She had received IV morphine a couple hours prior to this. Per ED provider: "but reports falling onto her R shoulder multiple times yesterday a may have hit her head. Pt reports resultant R shoulder pain with movement of RUE. Additionally, pt reports having worsening BLE pain/swelling for the last few days despite compliance with Lasix 80mg TID. However, she drinks 10-12 cans of 7up daily and does not follow any low sodium diet. Discussed with pt daughter about her condition, pt was discharged from SNF recently and has since deteriorated and has not been taking care of herself. Pt daughter unsure of her mother's medication compliance. Additionally, there have been issues securing home health services due to insurance issues."    Called daughter Lisa, who reports that sometimes her mom becomes very lethargic like this. States that she is unsure if her mom is taking medication appropriately. Patient lives in a  home with her  who is in his 80s and is unable to help care for her. Lisa states that her mom was doing very well after SNF last month, but she thinks when HH came by that it took her mom too long to get to the door so they left. She has significantly declined over the " last month. Lisa is considering NH placement and would like to discuss further with SW/CM.     ED: VSS, satting well on home 3L. CBC with chronic stable anemia. CMP unremarkable. BNP 27, troponin <0.006. R shoulder xray negative for acute fracture or dislocation. EKG in NSR. CT head pending.     Overview/Hospital Course:  Tasha Hawley was placed in  observation for acute encephalopathy. Suspected due to polypharmacy, however infectious encephalopathy possible. Patient found to have significant BLE swelling with skin breakdown and erythema concerning for cellulitis. Started linezolid, ID consulted and recommending switch to IV dapto - monitoring response. Wound care recommending both legs to be left open to air. Fluid removal with IV diuresis, but stopped due to hypotension. Will continue home dose of PO lasix. Suspect uncontrolled hypothyroidism due to poor med compliance, resuming home dose of synthroid 150. CM assisting with dc planning - likely needing retirement NH placement. Will attempt HH services in the interim.     Interval History: NAEON. BP soft, but stable. Patient reporting leg and back pain, but on exam BLE pain is improving. Swelling and erythema improving. ID recommending continuing IV daptomycin over the weekend. Daughter updated on POC. SW assisting with HH referrals, however no accepting companies at this time.     Review of Systems   Constitutional:  Positive for activity change. Negative for chills and fever.   Respiratory:  Negative for chest tightness and shortness of breath.    Cardiovascular:  Positive for leg swelling. Negative for chest pain.   Gastrointestinal:  Negative for abdominal pain and nausea.   Musculoskeletal:         +falls   Skin:  Positive for color change and wound.        BLE pain   Neurological:  Positive for weakness. Negative for dizziness and light-headedness.   Psychiatric/Behavioral:  Positive for sleep disturbance.      Objective:     Vital Signs (Most  Recent):  Temp: 97.8 °F (36.6 °C) (05/25/24 1127)  Pulse: (!) 58 (05/25/24 1127)  Resp: 12 (05/25/24 1127)  BP: 115/68 (05/25/24 1127)  SpO2: 98 % (05/25/24 1127) Vital Signs (24h Range):  Temp:  [97.5 °F (36.4 °C)-98.1 °F (36.7 °C)] 97.8 °F (36.6 °C)  Pulse:  [57-68] 58  Resp:  [12-20] 12  SpO2:  [95 %-100 %] 98 %  BP: ()/(51-68) 115/68     Weight: 96.6 kg (212 lb 15.4 oz)  Body mass index is 35.44 kg/m².    Intake/Output Summary (Last 24 hours) at 5/25/2024 1331  Last data filed at 5/25/2024 0900  Gross per 24 hour   Intake 120 ml   Output 1200 ml   Net -1080 ml         Physical Exam  Vitals and nursing note reviewed.   Constitutional:       Appearance: She is well-developed. She is obese. She is ill-appearing.   HENT:      Head: Normocephalic and atraumatic.   Eyes:      Extraocular Movements: Extraocular movements intact.      Pupils: Pupils are equal, round, and reactive to light.   Cardiovascular:      Rate and Rhythm: Normal rate and regular rhythm.      Pulses: Normal pulses.   Pulmonary:      Effort: Pulmonary effort is normal.      Breath sounds: Normal breath sounds.      Comments: 4L NC (baseline)  Abdominal:      Palpations: Abdomen is soft.      Tenderness: There is no abdominal tenderness.   Musculoskeletal:         General: Swelling and tenderness present. Normal range of motion.      Right lower leg: Edema present.      Left lower leg: Edema present.      Comments: Significant 3+ pitting BLE edema. Poor skin integrity, circumferential. Skin breakdown bilateral posterior calves. Drainage significantly improving. Erythema extending feet to upper skins, improving. BLE elevated.   Skin:     General: Skin is warm.      Findings: Erythema and lesion present.   Neurological:      Mental Status: She is alert and oriented to person, place, and time.      Gait: Gait abnormal.      Comments: Patient requiring assistance from bed to chair transfer, poor stability.   Psychiatric:         Behavior: Behavior  normal.             Significant Labs: All pertinent labs within the past 24 hours have been reviewed.  CBC:   Recent Labs   Lab 05/24/24  0307 05/25/24  0532   WBC 4.50 4.79   HGB 9.7* 10.0*   HCT 31.4* 33.0*    214     CMP:   Recent Labs   Lab 05/24/24  0307 05/25/24  0532    138   K 4.0 4.4    101   CO2 32* 31*   GLU 94 140*   BUN 10 7*   CREATININE 1.0 0.9   CALCIUM 8.1* 8.6*   ANIONGAP 6* 6*       Significant Imaging: I have reviewed all pertinent imaging results/findings within the past 24 hours.    Assessment/Plan:      Encephalopathy, metabolic  - Patient has acute metabolic encephalopathy unsure of etiology on admission  - Labs with unremarkable   - UA, CXR, lactic and blood cultures pending   -UA noninfectious    -CXR reviewed - edema v consolidation (will assess response to diuresis)   -LA wnl    -blood cultures pending    -0/4 SIRS  - CT head without acute intracranial findings  - VBG stable - c/w COPD   - bilateral LE with redness, start emperic linezolid for suspected cellulitis   - Treat the underlying cause and monitor for improvement  - Monitor with q4 neuro checks  - Avoid narcotics and benzos that will exacerbate agitation, and use PRN anti-psychotics for controls of behavior for self harm.    - ddx: infection, uncontrolled hypothyroidism, polypharmacy, medication mismanagement, hypercapnia, worsening dementia, delirium  - Delirium precautions  --> Encephalopathy resolved 5/23  --> will address polypharmacy, continue cellulitis management, discuss dispo with family and CM      Insomnia  - HOLD amitriptyline, trazodone and seroquel as may be contributing to encephalopathy    --> resuming amitriptyline, seroquel. Decrease seroquel dose 100 -> 50 nightly.  --> DC trazodone - polypharmacy.    COPD (chronic obstructive pulmonary disease)  Patient's COPD is controlled currently.  Patient is currently off COPD Pathway. Continue Supplemental oxygen and monitor respiratory status closely.  "  - on 4L at home     Adrenal insufficiency  - continue fludrocortisone and hydrocortisone   -> endocrine planning for outpatient follow up testing      Chronic diastolic heart failure  - CXR reviewed: "Increased parenchymal attenuation projects over the left lower lung zone, may reflect atelectasis however developing consolidation not excluded."  - BNP 27  - troponin <0.006  Results for orders placed during the hospital encounter of 05/08/24    Echo    Interpretation Summary    Left Ventricle: The left ventricle is normal in size. Normal wall thickness. There is normal systolic function. Biplane (2D) method of discs ejection fraction is 69%. Grade I diastolic dysfunction.    Right Ventricle: Normal right ventricular cavity size. Systolic function is normal. TAPSE is 3.66 cm.    Normal left atrial size. The left atrium volume index is 28.1 mL/m2.    Normal right atrial size.    The aortic valve is structurally normal. There is normal leaflet mobility.    The mitral valve is structurally normal. There is normal leaflet mobility.    The tricuspid valve is structurally normal. There is normal leaflet mobility.        Pure hypercholesterolemia  - continue statin       Lymphedema of both lower extremities  Cellulitis     - acute on chronic lymphedema   - 80mg of IV lasix given in the ED   - bilateral LE US ordered - neg for DVT  - monitor response and can continue to diureses as needed    - 2L output with 80 IV lasix x1   - decrease to 20 IV BID and monitor response --> discontinued 5/24 for hypotension    -> resume home lasix 40 daily   - wound care consulted  - strict I/Os   - fluid restriction   - concern for cellulitis-- start linezolid q12 hrs as she has a hx of anaphalaxis to vancomycin   - ID consulted for approval      --> switched to IV dapto per ID recs. Monitor response.      Hypothyroidism (acquired)  - continue synthroid -- unsure if patient has been compliant though  - TSH 26.179, T4 0.69  - consult " endocrine   --> likely 2/2 noncompliance. Restart synthroid.    Frequent falls  Physical deconditioning   Hemiplegia and hemiparesis following cerebral infarction affecting left non-dominant side  - recent stay in SNF, has not been able to take care of herself at home  - CM will discuss additional resources following d/c - likely needing new senior living NH placement  - CT head without acute findings  - fall precautions   - consider PT/OT consult but not likely a candidate as she was just discharged   --> Due to extended stay, will consult therapy for maintenance     Neurogenic bladder  Suprapubic catheter  - darifenacin not on formulary here, will start oxybutynin     --> discontinue, pt has catheter and is not taking med at home. Managing polypharmacy.    Severe obesity (BMI 35.0-39.9) with comorbidity  Body mass index is 35.45 kg/m². Morbid obesity complicates all aspects of disease management from diagnostic modalities to treatment. Weight loss encouraged and health benefits explained to patient.         Iron deficiency anemia  Patient's anemia is currently controlled. Has not received any PRBCs to date. Etiology likely d/t Iron deficiency  Current CBC reviewed-   Lab Results   Component Value Date    HGB 10.6 (L) 05/22/2024    HCT 34.4 (L) 05/22/2024     Monitor serial CBC and transfuse if patient becomes hemodynamically unstable, symptomatic or H/H drops below 7/21.    Sleep apnea  - CPAP QHS      Generalized anxiety disorder  - on amitriptyline, cymbalta  - holding in the setting of acute AMS of undetermined source   --> resume and monitor  - discontinue hydroxyzine. Not taking.        VTE Risk Mitigation (From admission, onward)           Ordered     enoxaparin injection 40 mg  Daily         05/22/24 1540     IP VTE HIGH RISK PATIENT  Once         05/22/24 1540                    Discharge Planning   JACKIE: 5/27/2024     Code Status: Full Code   Is the patient medically ready for discharge?:     Reason for  patient still in hospital (select all that apply): Patient trending condition, Laboratory test, Treatment, Consult recommendations, PT / OT recommendations, and Pending disposition  Discharge Plan A: Home Health                  Marjan Cervantes PA-C  Department of Hospital Medicine   [facility] - Observation 11H

## 2024-05-25 NOTE — SUBJECTIVE & OBJECTIVE
Interval History: Doing a little better. Pain due to leg ulcers - sticking to pad. Less red and swollen.    Review of Systems   All other systems reviewed and are negative.    Objective:     Vital Signs (Most Recent):  Temp: 97.5 °F (36.4 °C) (05/25/24 0731)  Pulse: 61 (05/25/24 0731)  Resp: 20 (05/25/24 0731)  BP: 119/63 (05/25/24 0731)  SpO2: 98 % (05/25/24 0815) Vital Signs (24h Range):  Temp:  [97.5 °F (36.4 °C)-98.1 °F (36.7 °C)] 97.5 °F (36.4 °C)  Pulse:  [57-68] 61  Resp:  [17-20] 20  SpO2:  [95 %-100 %] 98 %  BP: ()/(51-63) 119/63     Weight: 96.6 kg (212 lb 15.4 oz)  Body mass index is 35.44 kg/m².    Estimated Creatinine Clearance: 69.7 mL/min (based on SCr of 0.9 mg/dL).     Physical Exam  Vitals and nursing note reviewed.   Constitutional:       Appearance: Normal appearance. She is not ill-appearing or diaphoretic.   HENT:      Head: Normocephalic.   Eyes:      Pupils: Pupils are equal, round, and reactive to light.   Musculoskeletal:      Left lower leg: Edema present.   Skin:     Findings: Erythema present.   Neurological:      Mental Status: She is alert.   Psychiatric:         Mood and Affect: Mood normal.         Thought Content: Thought content normal.         Judgment: Judgment normal.          Significant Labs: CBC:   Recent Labs   Lab 05/24/24  0307 05/25/24  0532   WBC 4.50 4.79   HGB 9.7* 10.0*   HCT 31.4* 33.0*    214     Microbiology Results (last 7 days)       Procedure Component Value Units Date/Time    Blood culture [[id number]]     Order Status: Sent Specimen: Blood     Blood culture [[id number]]     Order Status: Sent Specimen: Blood             Significant Imaging: I have reviewed all pertinent imaging results/findings within the past 24 hours.

## 2024-05-25 NOTE — PROGRESS NOTES
[facility]  Endocrinology  Progress Note    Admit Date: 5/22/2024     Tasha Hawley is a 68 y.o. female with a medical history of COPD (on 3-4L), HFpEF, BLE lymphedema, diastolic HF, indwelling suprapubic catheter, chronic pain on pain pump, hypothyroidism and concerns for prior adrenal insuffiencey.   She presented to the ED after having 6 falls prior to admission.  Recent stay at a SNF and since then she has worsened despite having her  at home to assist.  Reports are that her  is in his 80's and not much help though so there has been ongoing concern for medication non-compliance per the daughter.  Issues also recently with no having approval for home health due to insurance issues.  Has worsening discoloration of her legs concerning for cellulitis so antibiotics started.  Labs performed included thyroid function studies which were abnormal prompting consult to endocrinology.      Review of chart shows this patient has a long standing history of hypothyroidism with prior use of Levothyroxine and Liothyronine.  In prior years this was adjusted to LT4 alone.  More recently on chart review she appears to be on 150 mcg of Levothyroxine and she states her pill is blue in color which matches this description.  She does admit to compliance issues at times and her daughter agrees that is most likely the case.  Dosing at home currently is close to her weight predicted dose.      Lab Results   Component Value Date    TSH 26.179 (H) 05/22/2024    M2JSOTQ 5.2 05/17/2020    FREET4 0.69 (L) 05/22/2024     Previously seen in the outpatient endocrine clinic (6/2023) for evaluation of adrenal insufficiency concerns.  Had ACTH stimulation testing performed in the months prior to that visit with results as below.  Performed due to concerns for long standing opioid use causing AI.  Stimulation was noted to be in the setting of known hypoalbuminemia.  Due to cortisol being less than 18 the patient was  "placed on florinef and hydrocortisone and has been on this ever since.  Florinef possibly added in setting of low blood pressure and concerns for orthostatic hypotension.     Latest Reference Range & Units 01/16/23 07:57 01/16/23 08:35 01/16/23 09:10   Cortisol ug/dL 8.60 16.40 17.20     The stimulation testing appears to have been done in the setting of hypotension during a hospital admission when patient was noted to be on a dilaudid pain pump and needing vasopressor support.    Hydrocortisone and florinef doses were decreased on last endocrine visit but ultimately appear to be an ongoing medication though compliance is not entirely known even speaking with the patient and daughter on day of consult.        Interval HPI:   Overnight events:  ACTH stim test completed on 5/24 but timing issues with lab draws so not accurate likely.  Restarted on steroids.        /63 (BP Location: Left arm, Patient Position: Lying)   Pulse 61   Temp 97.5 °F (36.4 °C) (Oral)   Resp 20   Ht 5' 5" (1.651 m)   Wt 96.6 kg (212 lb 15.4 oz)   LMP  (LMP Unknown)   SpO2 98%   Breastfeeding No   BMI 35.44 kg/m²     Labs Reviewed and Include    Recent Labs   Lab 05/25/24  0532   *   CALCIUM 8.6*      K 4.4   CO2 31*      BUN 7*   CREATININE 0.9     Lab Results   Component Value Date    WBC 4.79 05/25/2024    HGB 10.0 (L) 05/25/2024    HCT 33.0 (L) 05/25/2024    MCV 91 05/25/2024     05/25/2024     Recent Labs   Lab 05/22/24  1357   TSH 26.179*   FREET4 0.69*     Lab Results   Component Value Date    HGBA1C 5.3 05/03/2024       Nutritional status:   Body mass index is 35.44 kg/m².  Lab Results   Component Value Date    ALBUMIN 3.0 (L) 05/22/2024    ALBUMIN 3.6 05/16/2024    ALBUMIN 3.6 05/08/2024     Lab Results   Component Value Date    PREALBUMIN 13 (L) 02/02/2017       Estimated Creatinine Clearance: 69.7 mL/min (based on SCr of 0.9 mg/dL).    Accu-Checks  Recent Labs     05/23/24 2020   POCTGLUCOSE " 87       Current Medications and/or Treatments Impacting Glycemic Control  Immunotherapy:    Immunosuppressants       None          Steroids:   Hormones (From admission, onward)      Start     Stop Route Frequency Ordered    05/25/24 1615  hydrocortisone tablet 5 mg         -- Oral Before dinner 05/24/24 1603    05/25/24 0900  hydrocortisone tablet 10 mg         -- Oral Daily 05/24/24 1603    05/22/24 2100  fludrocortisone (FLORINEF) split tablet 50 mcg         -- Oral Daily 05/22/24 1817    05/22/24 1634  melatonin tablet 6 mg         -- Oral Nightly PRN 05/22/24 1540          Pressors:    Autonomic Drugs (From admission, onward)      None          Hyperglycemia/Diabetes Medications:   Antihyperglycemics (From admission, onward)      None            ASSESSMENT and PLAN    ID  * Bilateral cellulitis of lower leg  Antibiotics per primary and ID      Endocrine  Adrenal insufficiency  Prior concerns for abnormal ACTH stimulation test in 2023.  Most likely degree of cortisol elevation with testing was normal in the setting of hypoalbuminemia at the time.  Has been on once nightly hydrocortisone dosing but compliance is unknown.  Also on Florinef but may have been added in setting of orthostatic hypotension concerns.     Had prior 8 am cortisol levels outpatient but unknown if these were in the setting of HC use of if doses were held prior to labs.    ACTH stim testing on 5/24 but timing of labs were an issue so unable to confirm or rule out AI.  Baseline cortisol before cosyntropin was 9.9 which is a good value for mid day at a 1 pm lab draw.  Still unlikely to be AI but further testing needed.      Plan:  - Restarted on hydrocortisone given lab issues   - Continue 10 mg HC every morning   - Continue 5 mg HC early afternoon (between 2 and 4 pm daily)    - ACTH stimulation testing will be done again in the coming weeks, but outpatient in the endocrine clinic after discharge  - Will need to have patient hold he PM doses  of HC the day before future ACTH stim testing    - No immediate concerns for primary adrenal insuffiencey which would be the only endocrine related indication to use Florinef.  If concerns for orthostatic hypotension then Florinef can be helpful vs Midodrine, usually Florinef dose is higher in that setting.  Defer to primary team and outside providers on it's use.  Chronic pain medication use with pain pump could be playing a role in lower BP's.  No need for Florinef from our eyes.        Hypothyroidism (acquired)  Long standing hypothyroidism.  Issues over the years with medication compliance.  The degree of TSH elevation and mildly decreased Free T4 level matches more of a pattern of intermittent use of Levothyroxine.  Patient confirms compliance issues and chart review shows refills have been filled 78% of the time.      No concerns based on labs and symptoms to suggest myxedema coma or other related concerns.  No IV levothyroxine is needed in this instance and home dosing should be continued.      Plan:  - Continue Levothyroxine 150 mcg once daily (close to weight based dosing)  - At discharge compliance is encouraged with use of a pill box daily  - Will need TSH labs set up closer to her endocrine appointment in early July 2024 (we will arrange)      Other  Lymphedema of both lower extremities  Chronic with now concerns for cellulitis.  Management per primary and ID has been following for antibiotics.      Frequent falls  Presented with recurrent falls  Ongoing management of other related conditions per primary  Cellulitis diagnosed at this time  Issues with continued pain and lower blood pressures at times        Endocrine service does not plan any further adjustments to steroid or Levothyroxine doses during this admission.  Endocrine will sign off at this time. Thank you for this consult.      Discharged Recommendations:  - Discharge on hydrocortisone 10 mg in the morning and 5 mg in the afternoon (2-4pm)  -  Discharge on Levothyroxine 150 mcg once daily early morning on empty stomach  - We will arrange labs outpatient for ACTH stim testing and repeat TSH levels    Reach out with any questions, otherwise we will see patient at her visit in July.      Betito Andrade, DO  Endocrinology

## 2024-05-25 NOTE — ASSESSMENT & PLAN NOTE
Physical deconditioning   Hemiplegia and hemiparesis following cerebral infarction affecting left non-dominant side  - recent stay in SNF, has not been able to take care of herself at home  - CM will discuss additional resources following d/c - likely needing new detention NH placement  - CT head without acute findings  - fall precautions   - consider PT/OT consult but not likely a candidate as she was just discharged   --> Due to extended stay, will consult therapy for maintenance

## 2024-05-25 NOTE — NURSING
Nurses Note -- 4 Eyes      5/25/2024   6:43 PM      Skin assessed during: Q Shift Change      [] No Altered Skin Integrity Present    []Prevention Measures Documented      [x] Yes- Altered Skin Integrity Present or Discovered   [] LDA Added if Not in Epic (Describe Wound)   [] New Altered Skin Integrity was Present on Admit and Documented in LDA   [] Wound Image Taken    Wound Care Consulted? No    Attending Nurse: Tegan Ayala RN/Staff Member:  Fran    No new wounds noted

## 2024-05-25 NOTE — ASSESSMENT & PLAN NOTE
69 yo woman with chronic lymphedema, admitted with bilateral cellulitis and skin breakdown. Course complicated by likely venous stasis dermatitis.    Slight improvement in edema but her legs are still painful.    Recommendations  Continue adequate elevation (ankles>knees.hips)  Continue daptomycin through weekend  Consider non-stick dressing to legs

## 2024-05-25 NOTE — ASSESSMENT & PLAN NOTE
Chronic with now concerns for cellulitis.  Management per primary and ID has been following for antibiotics.

## 2024-05-25 NOTE — SUBJECTIVE & OBJECTIVE
"Interval HPI:   Overnight events:  ACTH stim test completed on 5/24 but timing issues with lab draws so not accurate likely.  Restarted on steroids.        /63 (BP Location: Left arm, Patient Position: Lying)   Pulse 61   Temp 97.5 °F (36.4 °C) (Oral)   Resp 20   Ht 5' 5" (1.651 m)   Wt 96.6 kg (212 lb 15.4 oz)   LMP  (LMP Unknown)   SpO2 98%   Breastfeeding No   BMI 35.44 kg/m²     Labs Reviewed and Include    Recent Labs   Lab 05/25/24  0532   *   CALCIUM 8.6*      K 4.4   CO2 31*      BUN 7*   CREATININE 0.9     Lab Results   Component Value Date    WBC 4.79 05/25/2024    HGB 10.0 (L) 05/25/2024    HCT 33.0 (L) 05/25/2024    MCV 91 05/25/2024     05/25/2024     Recent Labs   Lab 05/22/24  1357   TSH 26.179*   FREET4 0.69*     Lab Results   Component Value Date    HGBA1C 5.3 05/03/2024       Nutritional status:   Body mass index is 35.44 kg/m².  Lab Results   Component Value Date    ALBUMIN 3.0 (L) 05/22/2024    ALBUMIN 3.6 05/16/2024    ALBUMIN 3.6 05/08/2024     Lab Results   Component Value Date    PREALBUMIN 13 (L) 02/02/2017       Estimated Creatinine Clearance: 69.7 mL/min (based on SCr of 0.9 mg/dL).    Accu-Checks  Recent Labs     05/23/24 2020   POCTGLUCOSE 87       Current Medications and/or Treatments Impacting Glycemic Control  Immunotherapy:    Immunosuppressants       None          Steroids:   Hormones (From admission, onward)      Start     Stop Route Frequency Ordered    05/25/24 1615  hydrocortisone tablet 5 mg         -- Oral Before dinner 05/24/24 1603    05/25/24 0900  hydrocortisone tablet 10 mg         -- Oral Daily 05/24/24 1603    05/22/24 2100  fludrocortisone (FLORINEF) split tablet 50 mcg         -- Oral Daily 05/22/24 1817    05/22/24 1634  melatonin tablet 6 mg         -- Oral Nightly PRN 05/22/24 1540          Pressors:    Autonomic Drugs (From admission, onward)      None          Hyperglycemia/Diabetes Medications:   Antihyperglycemics (From " admission, onward)      None

## 2024-05-25 NOTE — SUBJECTIVE & OBJECTIVE
Interval History: NAEON. BP soft, but stable. Patient reporting leg and back pain, but on exam BLE pain is improving. Swelling and erythema improving. ID recommending continuing IV daptomycin over the weekend. Daughter updated on POC. SW assisting with HH referrals, however no accepting companies at this time.     Review of Systems   Constitutional:  Positive for activity change. Negative for chills and fever.   Respiratory:  Negative for chest tightness and shortness of breath.    Cardiovascular:  Positive for leg swelling. Negative for chest pain.   Gastrointestinal:  Negative for abdominal pain and nausea.   Musculoskeletal:         +falls   Skin:  Positive for color change and wound.        BLE pain   Neurological:  Positive for weakness. Negative for dizziness and light-headedness.   Psychiatric/Behavioral:  Positive for sleep disturbance.      Objective:     Vital Signs (Most Recent):  Temp: 97.8 °F (36.6 °C) (05/25/24 1127)  Pulse: (!) 58 (05/25/24 1127)  Resp: 12 (05/25/24 1127)  BP: 115/68 (05/25/24 1127)  SpO2: 98 % (05/25/24 1127) Vital Signs (24h Range):  Temp:  [97.5 °F (36.4 °C)-98.1 °F (36.7 °C)] 97.8 °F (36.6 °C)  Pulse:  [57-68] 58  Resp:  [12-20] 12  SpO2:  [95 %-100 %] 98 %  BP: ()/(51-68) 115/68     Weight: 96.6 kg (212 lb 15.4 oz)  Body mass index is 35.44 kg/m².    Intake/Output Summary (Last 24 hours) at 5/25/2024 1331  Last data filed at 5/25/2024 0900  Gross per 24 hour   Intake 120 ml   Output 1200 ml   Net -1080 ml         Physical Exam  Vitals and nursing note reviewed.   Constitutional:       Appearance: She is well-developed. She is obese. She is ill-appearing.   HENT:      Head: Normocephalic and atraumatic.   Eyes:      Extraocular Movements: Extraocular movements intact.      Pupils: Pupils are equal, round, and reactive to light.   Cardiovascular:      Rate and Rhythm: Normal rate and regular rhythm.      Pulses: Normal pulses.   Pulmonary:      Effort: Pulmonary effort is  normal.      Breath sounds: Normal breath sounds.      Comments: 4L NC (baseline)  Abdominal:      Palpations: Abdomen is soft.      Tenderness: There is no abdominal tenderness.   Musculoskeletal:         General: Swelling and tenderness present. Normal range of motion.      Right lower leg: Edema present.      Left lower leg: Edema present.      Comments: Significant 3+ pitting BLE edema. Poor skin integrity, circumferential. Skin breakdown bilateral posterior calves. Drainage significantly improving. Erythema extending feet to upper skins, improving. BLE elevated.   Skin:     General: Skin is warm.      Findings: Erythema and lesion present.   Neurological:      Mental Status: She is alert and oriented to person, place, and time.      Gait: Gait abnormal.      Comments: Patient requiring assistance from bed to chair transfer, poor stability.   Psychiatric:         Behavior: Behavior normal.             Significant Labs: All pertinent labs within the past 24 hours have been reviewed.  CBC:   Recent Labs   Lab 05/24/24  0307 05/25/24  0532   WBC 4.50 4.79   HGB 9.7* 10.0*   HCT 31.4* 33.0*    214     CMP:   Recent Labs   Lab 05/24/24  0307 05/25/24  0532    138   K 4.0 4.4    101   CO2 32* 31*   GLU 94 140*   BUN 10 7*   CREATININE 1.0 0.9   CALCIUM 8.1* 8.6*   ANIONGAP 6* 6*       Significant Imaging: I have reviewed all pertinent imaging results/findings within the past 24 hours.

## 2024-05-25 NOTE — ASSESSMENT & PLAN NOTE
- continue fludrocortisone and hydrocortisone   -> endocrine planning for outpatient follow up testing

## 2024-05-25 NOTE — ASSESSMENT & PLAN NOTE
Long standing hypothyroidism.  Issues over the years with medication compliance.  The degree of TSH elevation and mildly decreased Free T4 level matches more of a pattern of intermittent use of Levothyroxine.  Patient confirms compliance issues and chart review shows refills have been filled 78% of the time.      No concerns based on labs and symptoms to suggest myxedema coma or other related concerns.  No IV levothyroxine is needed in this instance and home dosing should be continued.      Plan:  - Continue Levothyroxine 150 mcg once daily (close to weight based dosing)  - At discharge compliance is encouraged with use of a pill box daily  - Will need TSH labs set up closer to her endocrine appointment in early July 2024 (we will arrange)

## 2024-05-25 NOTE — ASSESSMENT & PLAN NOTE
Cellulitis     - acute on chronic lymphedema   - 80mg of IV lasix given in the ED   - bilateral LE US ordered - neg for DVT  - monitor response and can continue to diureses as needed    - 2L output with 80 IV lasix x1   - decrease to 20 IV BID and monitor response --> discontinued 5/24 for hypotension    -> resume home lasix 40 daily   - wound care consulted  - strict I/Os   - fluid restriction   - concern for cellulitis-- start linezolid q12 hrs as she has a hx of anaphalaxis to vancomycin   - ID consulted for approval      --> switched to IV dapto per ID recs. Monitor response.

## 2024-05-25 NOTE — PROGRESS NOTES
"[facility]  Infectious Disease  Progress Note    Patient Name: Tasha Hawley  MRN: [id number]  Admission Date: 5/22/2024  Length of Stay: 2 days  Attending Physician: Rebecca Sahni MD  Primary Care Provider: Mesfin Hodges II, MD    Isolation Status: No active isolations  Assessment/Plan:      ID  * Bilateral cellulitis of lower leg  67 yo woman with chronic lymphedema, admitted with bilateral cellulitis and skin breakdown. Course complicated by likely venous stasis dermatitis.    Slight improvement in edema but her legs are still painful.    Recommendations  Continue adequate elevation (ankles>knees.hips)  Continue daptomycin through weekend  Consider non-stick dressing to legs      Andrey Galarza MD  Infectious Disease  [facility]    Subjective:     Principal Problem:Bilateral cellulitis of lower leg    HPI: Ms. Hawley is a pleasant 68 y.o. woman admitted with cellulitis of her legs due to chronic edema. She has COPD (on 4L), HFpEF, chronic BLE lymphedema,adrenal insufficiency, diastolic HF, indwelling suprapubic catheter presents to the ED after having 6 falls. She tells me that her pump was turned doen and that her legs started to swell. When she looked down, she saw that they were very red. Her falls were due, in part, to her lymphedema. She was evaluated in the ED and started on Zyvox for cellulitis. ID is consulted for "On linezolid d/t hx of anaphylaxis from vancomycin."    Lives at home with . Daughter at bedside provided additional history.    Interval History: Doing a little better. Pain due to leg ulcers - sticking to pad. Less red and swollen.    Review of Systems   All other systems reviewed and are negative.    Objective:     Vital Signs (Most Recent):  Temp: 97.5 °F (36.4 °C) (05/25/24 0731)  Pulse: 61 (05/25/24 0731)  Resp: 20 (05/25/24 0731)  BP: 119/63 (05/25/24 0731)  SpO2: 98 % (05/25/24 0815) Vital Signs (24h Range):  Temp:  [97.5 °F (36.4 " °C)-98.1 °F (36.7 °C)] 97.5 °F (36.4 °C)  Pulse:  [57-68] 61  Resp:  [17-20] 20  SpO2:  [95 %-100 %] 98 %  BP: ()/(51-63) 119/63     Weight: 96.6 kg (212 lb 15.4 oz)  Body mass index is 35.44 kg/m².    Estimated Creatinine Clearance: 69.7 mL/min (based on SCr of 0.9 mg/dL).     Physical Exam  Vitals and nursing note reviewed.   Constitutional:       Appearance: Normal appearance. She is not ill-appearing or diaphoretic.   HENT:      Head: Normocephalic.   Eyes:      Pupils: Pupils are equal, round, and reactive to light.   Musculoskeletal:      Left lower leg: Edema present.   Skin:     Findings: Erythema present.   Neurological:      Mental Status: She is alert.   Psychiatric:         Mood and Affect: Mood normal.         Thought Content: Thought content normal.         Judgment: Judgment normal.          Significant Labs: CBC:   Recent Labs   Lab 05/24/24  0307 05/25/24  0532   WBC 4.50 4.79   HGB 9.7* 10.0*   HCT 31.4* 33.0*    214     Microbiology Results (last 7 days)       Procedure Component Value Units Date/Time    Blood culture [[id number]]     Order Status: Sent Specimen: Blood     Blood culture [[id number]]     Order Status: Sent Specimen: Blood             Significant Imaging: I have reviewed all pertinent imaging results/findings within the past 24 hours.

## 2024-05-26 PROCEDURE — 99233 SBSQ HOSP IP/OBS HIGH 50: CPT | Mod: HCNC,,, | Performed by: INTERNAL MEDICINE

## 2024-05-26 PROCEDURE — 25000003 PHARM REV CODE 250: Mod: HCNC | Performed by: INTERNAL MEDICINE

## 2024-05-26 PROCEDURE — 94761 N-INVAS EAR/PLS OXIMETRY MLT: CPT | Mod: HCNC

## 2024-05-26 PROCEDURE — 99900035 HC TECH TIME PER 15 MIN (STAT): Mod: HCNC

## 2024-05-26 PROCEDURE — 25000003 PHARM REV CODE 250: Mod: HCNC | Performed by: STUDENT IN AN ORGANIZED HEALTH CARE EDUCATION/TRAINING PROGRAM

## 2024-05-26 PROCEDURE — 25000242 PHARM REV CODE 250 ALT 637 W/ HCPCS: Mod: HCNC

## 2024-05-26 PROCEDURE — 63600175 PHARM REV CODE 636 W HCPCS: Mod: HCNC | Performed by: INTERNAL MEDICINE

## 2024-05-26 PROCEDURE — 21400001 HC TELEMETRY ROOM: Mod: HCNC

## 2024-05-26 PROCEDURE — 97116 GAIT TRAINING THERAPY: CPT | Mod: HCNC

## 2024-05-26 PROCEDURE — 63600175 PHARM REV CODE 636 W HCPCS: Mod: HCNC

## 2024-05-26 PROCEDURE — 27000221 HC OXYGEN, UP TO 24 HOURS: Mod: HCNC

## 2024-05-26 PROCEDURE — 25000003 PHARM REV CODE 250: Mod: HCNC

## 2024-05-26 PROCEDURE — 97165 OT EVAL LOW COMPLEX 30 MIN: CPT | Mod: HCNC

## 2024-05-26 PROCEDURE — 97530 THERAPEUTIC ACTIVITIES: CPT | Mod: HCNC

## 2024-05-26 PROCEDURE — 97161 PT EVAL LOW COMPLEX 20 MIN: CPT | Mod: HCNC

## 2024-05-26 RX ORDER — HYDROCODONE BITARTRATE AND ACETAMINOPHEN 5; 325 MG/1; MG/1
1 TABLET ORAL EVERY 6 HOURS PRN
Status: DISCONTINUED | OUTPATIENT
Start: 2024-05-26 | End: 2024-05-29 | Stop reason: HOSPADM

## 2024-05-26 RX ORDER — METHOCARBAMOL 500 MG/1
500 TABLET, FILM COATED ORAL 4 TIMES DAILY PRN
Status: DISCONTINUED | OUTPATIENT
Start: 2024-05-26 | End: 2024-05-29 | Stop reason: HOSPADM

## 2024-05-26 RX ADMIN — ACETAMINOPHEN 1000 MG: 325 TABLET ORAL at 05:05

## 2024-05-26 RX ADMIN — ATORVASTATIN CALCIUM 80 MG: 40 TABLET, FILM COATED ORAL at 08:05

## 2024-05-26 RX ADMIN — DULOXETINE HYDROCHLORIDE 60 MG: 30 CAPSULE, DELAYED RELEASE ORAL at 09:05

## 2024-05-26 RX ADMIN — DAPTOMYCIN 775 MG: 350 INJECTION, POWDER, LYOPHILIZED, FOR SOLUTION INTRAVENOUS at 01:05

## 2024-05-26 RX ADMIN — HYDROCORTISONE 5 MG: 5 TABLET ORAL at 05:05

## 2024-05-26 RX ADMIN — ACETAMINOPHEN 1000 MG: 325 TABLET ORAL at 02:05

## 2024-05-26 RX ADMIN — HYDROCODONE BITARTRATE AND ACETAMINOPHEN 1 TABLET: 5; 325 TABLET ORAL at 09:05

## 2024-05-26 RX ADMIN — PANTOPRAZOLE SODIUM 40 MG: 40 TABLET, DELAYED RELEASE ORAL at 08:05

## 2024-05-26 RX ADMIN — DOCUSATE SODIUM 200 MG: 50 CAPSULE, LIQUID FILLED ORAL at 09:05

## 2024-05-26 RX ADMIN — FUROSEMIDE 40 MG: 40 TABLET ORAL at 08:05

## 2024-05-26 RX ADMIN — FLUDROCORTISONE ACETATE 50 MCG: 0.1 TABLET ORAL at 08:05

## 2024-05-26 RX ADMIN — HYDROCORTISONE 10 MG: 5 TABLET ORAL at 08:05

## 2024-05-26 RX ADMIN — SENNOSIDES 2 TABLET: 8.6 TABLET, FILM COATED ORAL at 08:05

## 2024-05-26 RX ADMIN — QUETIAPINE FUMARATE 50 MG: 25 TABLET ORAL at 09:05

## 2024-05-26 RX ADMIN — AMITRIPTYLINE HYDROCHLORIDE 25 MG: 25 TABLET, FILM COATED ORAL at 09:05

## 2024-05-26 RX ADMIN — LEVOTHYROXINE SODIUM 150 MCG: 150 TABLET ORAL at 05:05

## 2024-05-26 RX ADMIN — TIOTROPIUM BROMIDE INHALATION SPRAY 2 PUFF: 3.12 SPRAY, METERED RESPIRATORY (INHALATION) at 07:05

## 2024-05-26 RX ADMIN — ENOXAPARIN SODIUM 40 MG: 40 INJECTION SUBCUTANEOUS at 05:05

## 2024-05-26 RX ADMIN — OXYBUTYNIN CHLORIDE 5 MG: 5 TABLET, EXTENDED RELEASE ORAL at 08:05

## 2024-05-26 RX ADMIN — SENNOSIDES 2 TABLET: 8.6 TABLET, FILM COATED ORAL at 09:05

## 2024-05-26 RX ADMIN — METHOCARBAMOL 500 MG: 500 TABLET ORAL at 01:05

## 2024-05-26 RX ADMIN — DULOXETINE HYDROCHLORIDE 60 MG: 30 CAPSULE, DELAYED RELEASE ORAL at 08:05

## 2024-05-26 NOTE — PLAN OF CARE
Problem: Adult Inpatient Plan of Care  Goal: Plan of Care Review  Outcome: Progressing  Goal: Patient-Specific Goal (Individualized)  Outcome: Progressing  Goal: Absence of Hospital-Acquired Illness or Injury  Outcome: Progressing  Goal: Optimal Comfort and Wellbeing  Outcome: Progressing  Goal: Readiness for Transition of Care  Outcome: Progressing     Problem: Infection  Goal: Absence of Infection Signs and Symptoms  Outcome: Progressing     Problem: Wound  Goal: Optimal Coping  Outcome: Progressing  Goal: Optimal Functional Ability  Outcome: Progressing  Goal: Absence of Infection Signs and Symptoms  Outcome: Progressing  Goal: Improved Oral Intake  Outcome: Progressing  Goal: Optimal Pain Control and Function  Outcome: Progressing  Goal: Skin Health and Integrity  Outcome: Progressing  Goal: Optimal Wound Healing  Outcome: Progressing     Problem: Skin Injury Risk Increased  Goal: Skin Health and Integrity  Outcome: Progressing      normal

## 2024-05-26 NOTE — PROGRESS NOTES
"[facility] - Observation [address] [place] [address] Medicine  Progress Note    Patient Name: Tasha Hawley  MRN: [id number]  Patient Class: IP- Inpatient   Admission Date: 5/22/2024  Length of Stay: 3 days  Attending Physician: Rebecca Sahni MD  Primary Care Provider: Mesfin Hodges II, MD        Subjective:     Principal Problem:Bilateral cellulitis of lower leg        HPI:  Tasha Hawley is a 68 y.o. female with a history of COPD (on 4L), HFpEF, chronic BLE lymphedema,adrenal insufficiency, diastolic HF, indwelling suprapubic catheter presents to the ED after having 6 falls yesterday. Patient is a poor historian and lethargic on exam. Awakens to verbal stimuli. Oriented to person and place, but unable to provide any history on my interview. She had received IV morphine a couple hours prior to this. Per ED provider: "but reports falling onto her R shoulder multiple times yesterday a may have hit her head. Pt reports resultant R shoulder pain with movement of RUE. Additionally, pt reports having worsening BLE pain/swelling for the last few days despite compliance with Lasix 80mg TID. However, she drinks 10-12 cans of 7up daily and does not follow any low sodium diet. Discussed with pt daughter about her condition, pt was discharged from SNF recently and has since deteriorated and has not been taking care of herself. Pt daughter unsure of her mother's medication compliance. Additionally, there have been issues securing home health services due to insurance issues."    Called daughter Lisa, who reports that sometimes her mom becomes very lethargic like this. States that she is unsure if her mom is taking medication appropriately. Patient lives in a  home with her  who is in his 80s and is unable to help care for her. Lisa states that her mom was doing very well after SNF last month, but she thinks when HH came by that it took her mom too long to get to the door so they left. She has significantly declined over the " last month. Lisa is considering NH placement and would like to discuss further with SW/CM.     ED: VSS, satting well on home 3L. CBC with chronic stable anemia. CMP unremarkable. BNP 27, troponin <0.006. R shoulder xray negative for acute fracture or dislocation. EKG in NSR. CT head pending.     Overview/Hospital Course:  Tasha Hawley was placed in  observation for acute encephalopathy. Suspected due to polypharmacy, however infectious encephalopathy possible. Patient found to have significant BLE swelling with skin breakdown and erythema concerning for cellulitis. Started linezolid, ID consulted and recommending switch to IV dapto - monitoring response. Wound care recommending both legs to be left open to air. Fluid removal with IV diuresis, but stopped due to hypotension. Will continue home dose of PO lasix. Suspect uncontrolled hypothyroidism due to poor med compliance, resuming home dose of synthroid 150. CM assisting with dc planning - likely needing [facility] NH placement. Will attempt HH services in the interim.     Interval History: NAEON. BP soft, but stable. Patient reporting leg and back pain, but on exam BLE pain is improving. Trial addition of robaxin. Swelling and erythema improving. ID recommending continuing IV daptomycin. SW assisting with HH referrals, however no accepting companies at this time. Will follow up on this.     Review of Systems   Constitutional:  Positive for activity change. Negative for chills and fever.   Respiratory:  Negative for chest tightness and shortness of breath.    Cardiovascular:  Positive for leg swelling. Negative for chest pain.   Gastrointestinal:  Negative for abdominal pain and nausea.   Musculoskeletal:         +falls   Skin:  Positive for color change and wound.        BLE pain   Neurological:  Positive for weakness. Negative for dizziness and light-headedness.   Psychiatric/Behavioral:  Positive for sleep disturbance.      Objective:     Vital Signs  (Most Recent):  Temp: 98.2 °F (36.8 °C) (05/26/24 1215)  Pulse: 65 (05/26/24 1215)  Resp: 16 (05/26/24 1215)  BP: (!) 95/52 (05/26/24 1215)  SpO2: 97 % (05/26/24 1215) Vital Signs (24h Range):  Temp:  [97.7 °F (36.5 °C)-98.7 °F (37.1 °C)] 98.2 °F (36.8 °C)  Pulse:  [61-84] 65  Resp:  [16-18] 16  SpO2:  [96 %-100 %] 97 %  BP: ()/(52-59) 95/52     Weight: 109.5 kg (241 lb 6.5 oz)  Body mass index is 40.17 kg/m².    Intake/Output Summary (Last 24 hours) at 5/26/2024 1515  Last data filed at 5/26/2024 1456  Gross per 24 hour   Intake 740 ml   Output 4250 ml   Net -3510 ml         Physical Exam  Vitals and nursing note reviewed.   Constitutional:       Appearance: She is well-developed. She is obese. She is ill-appearing.   HENT:      Head: Normocephalic and atraumatic.   Eyes:      Extraocular Movements: Extraocular movements intact.      Pupils: Pupils are equal, round, and reactive to light.   Cardiovascular:      Rate and Rhythm: Normal rate and regular rhythm.      Pulses: Normal pulses.   Pulmonary:      Effort: Pulmonary effort is normal.      Breath sounds: Normal breath sounds.      Comments: 4L NC (baseline)  Abdominal:      Palpations: Abdomen is soft.      Tenderness: There is no abdominal tenderness.   Musculoskeletal:         General: Swelling and tenderness present. Normal range of motion.      Right lower leg: Edema present.      Left lower leg: Edema present.      Comments: Significant 3+ pitting BLE edema. Poor skin integrity, circumferential. Skin breakdown bilateral posterior calves. Drainage significantly improving. Erythema extending feet to upper skins, improving. BLE elevated.   Skin:     General: Skin is warm.      Findings: Erythema and lesion present.   Neurological:      Mental Status: She is alert and oriented to person, place, and time.      Gait: Gait abnormal.      Comments: Patient requiring assistance from bed to chair transfer, poor stability.   Psychiatric:         Behavior:  Behavior normal.             Significant Labs: All pertinent labs within the past 24 hours have been reviewed.  CBC:   Recent Labs   Lab 05/25/24  0532   WBC 4.79   HGB 10.0*   HCT 33.0*        CMP:   Recent Labs   Lab 05/25/24  0532      K 4.4      CO2 31*   *   BUN 7*   CREATININE 0.9   CALCIUM 8.6*   ANIONGAP 6*       Significant Imaging: I have reviewed all pertinent imaging results/findings within the past 24 hours.    Assessment/Plan:      Encephalopathy, metabolic  - Patient has acute metabolic encephalopathy unsure of etiology on admission  - Labs with unremarkable   - UA, CXR, lactic and blood cultures pending   -UA noninfectious    -CXR reviewed - edema v consolidation (will assess response to diuresis)   -LA wnl    -blood cultures pending    -0/4 SIRS  - CT head without acute intracranial findings  - VBG stable - c/w COPD   - bilateral LE with redness, start emperic linezolid for suspected cellulitis   - Treat the underlying cause and monitor for improvement  - Monitor with q4 neuro checks  - Avoid narcotics and benzos that will exacerbate agitation, and use PRN anti-psychotics for controls of behavior for self harm.    - ddx: infection, uncontrolled hypothyroidism, polypharmacy, medication mismanagement, hypercapnia, worsening dementia, delirium  - Delirium precautions  --> Encephalopathy resolved 5/23  --> will address polypharmacy, continue cellulitis management, discuss dispo with family and CM      Insomnia  - HOLD amitriptyline, trazodone and seroquel as may be contributing to encephalopathy    --> resuming amitriptyline, seroquel. Decrease seroquel dose 100 -> 50 nightly.  --> DC trazodone - polypharmacy.    COPD (chronic obstructive pulmonary disease)  Patient's COPD is controlled currently.  Patient is currently off COPD Pathway. Continue Supplemental oxygen and monitor respiratory status closely.   - on 4L at home     Adrenal insufficiency  - continue fludrocortisone  "and hydrocortisone   -> endocrine planning for outpatient follow up testing      Chronic diastolic heart failure  - CXR reviewed: "Increased parenchymal attenuation projects over the left lower lung zone, may reflect atelectasis however developing consolidation not excluded."  - BNP 27  - troponin <0.006  Results for orders placed during the hospital encounter of 05/08/24    Echo    Interpretation Summary    Left Ventricle: The left ventricle is normal in size. Normal wall thickness. There is normal systolic function. Biplane (2D) method of discs ejection fraction is 69%. Grade I diastolic dysfunction.    Right Ventricle: Normal right ventricular cavity size. Systolic function is normal. TAPSE is 3.66 cm.    Normal left atrial size. The left atrium volume index is 28.1 mL/m2.    Normal right atrial size.    The aortic valve is structurally normal. There is normal leaflet mobility.    The mitral valve is structurally normal. There is normal leaflet mobility.    The tricuspid valve is structurally normal. There is normal leaflet mobility.        Pure hypercholesterolemia  - continue statin       Lymphedema of both lower extremities  Cellulitis     - acute on chronic lymphedema   - 80mg of IV lasix given in the ED   - bilateral LE US ordered - neg for DVT  - monitor response and can continue to diureses as needed    - 2L output with 80 IV lasix x1   - decrease to 20 IV BID and monitor response --> discontinued 5/24 for hypotension    -> resume home lasix 40 daily   - wound care consulted  - strict I/Os   - fluid restriction   - concern for cellulitis-- start linezolid q12 hrs as she has a hx of anaphalaxis to vancomycin   - ID consulted for approval      --> switched to IV dapto per ID recs. Monitor response.      Hypothyroidism (acquired)  - continue synthroid -- unsure if patient has been compliant though  - TSH 26.179, T4 0.69  - consult endocrine   --> likely 2/2 noncompliance. Restart synthroid.    Frequent " falls  Physical deconditioning   Hemiplegia and hemiparesis following cerebral infarction affecting left non-dominant side  - recent stay in SNF, has not been able to take care of herself at home  - CM will discuss additional resources following d/c - likely needing new senior care NH placement  - CT head without acute findings  - fall precautions   - consider PT/OT consult but not likely a candidate as she was just discharged   --> Due to extended stay, will consult therapy for maintenance     Neurogenic bladder  Suprapubic catheter  - darifenacin not on formulary here, will start oxybutynin     --> discontinue, pt has catheter and is not taking med at home. Managing polypharmacy.    Severe obesity (BMI 35.0-39.9) with comorbidity  Body mass index is 35.45 kg/m². Morbid obesity complicates all aspects of disease management from diagnostic modalities to treatment. Weight loss encouraged and health benefits explained to patient.         Iron deficiency anemia  Patient's anemia is currently controlled. Has not received any PRBCs to date. Etiology likely d/t Iron deficiency  Current CBC reviewed-   Lab Results   Component Value Date    HGB 10.6 (L) 05/22/2024    HCT 34.4 (L) 05/22/2024     Monitor serial CBC and transfuse if patient becomes hemodynamically unstable, symptomatic or H/H drops below 7/21.    Sleep apnea  - CPAP QHS      Generalized anxiety disorder  - on amitriptyline, cymbalta  - holding in the setting of acute AMS of undetermined source   --> resume and monitor  - discontinue hydroxyzine. Not taking.        VTE Risk Mitigation (From admission, onward)           Ordered     enoxaparin injection 40 mg  Daily         05/22/24 1540     IP VTE HIGH RISK PATIENT  Once         05/22/24 1540                    Discharge Planning   JACKIE: 5/27/2024     Code Status: Full Code   Is the patient medically ready for discharge?:     Reason for patient still in hospital (select all that apply): Patient trending condition,  Laboratory test, Treatment, and Consult recommendations  Discharge Plan A: Home Health                  Marjan Cervantes PA-C  Department of Hospital Medicine   [facility] - [facility] 11[facility]

## 2024-05-26 NOTE — SUBJECTIVE & OBJECTIVE
Interval History: Patient complains of continued leg pain when legs stick to pad. No fever or chills.    Review of Systems   Cardiovascular:  Positive for leg swelling.   Skin:  Positive for color change and wound.   All other systems reviewed and are negative.    Objective:     Vital Signs (Most Recent):  Temp: 97.7 °F (36.5 °C) (05/26/24 0831)  Pulse: 61 (05/26/24 0831)  Resp: 16 (05/26/24 0831)  BP: (!) 118/59 (05/26/24 0831)  SpO2: 98 % (05/26/24 0831) Vital Signs (24h Range):  Temp:  [97.5 °F (36.4 °C)-98.7 °F (37.1 °C)] 97.7 °F (36.5 °C)  Pulse:  [57-84] 61  Resp:  [16-18] 16  SpO2:  [96 %-100 %] 98 %  BP: ()/(52-68) 118/59     Weight: 109.5 kg (241 lb 6.5 oz)  Body mass index is 40.17 kg/m².    Estimated Creatinine Clearance: 74.7 mL/min (based on SCr of 0.9 mg/dL).     Physical Exam  Vitals and nursing note reviewed.   Constitutional:       General: She is not in acute distress.     Appearance: Normal appearance. She is not ill-appearing, toxic-appearing or diaphoretic.   HENT:      Head: Normocephalic and atraumatic.   Eyes:      Extraocular Movements: Extraocular movements intact.      Pupils: Pupils are equal, round, and reactive to light.   Musculoskeletal:         General: Swelling present.      Right lower leg: Edema present.      Left lower leg: Edema present.   Skin:     Findings: Erythema present.   Neurological:      General: No focal deficit present.      Mental Status: She is alert and oriented to person, place, and time.   Psychiatric:         Mood and Affect: Mood normal.         Behavior: Behavior normal.         Thought Content: Thought content normal.         Judgment: Judgment normal.          Significant Labs: BMP:   Recent Labs   Lab 05/25/24  0532   *      K 4.4      CO2 31*   BUN 7*   CREATININE 0.9   CALCIUM 8.6*   MG 1.9     CBC:   Recent Labs   Lab 05/25/24  0532   WBC 4.79   HGB 10.0*   HCT 33.0*        Microbiology Results (last 7 days)        Procedure Component Value Units Date/Time    Blood culture [[id number]]     Order Status: Sent Specimen: Blood     Blood culture [[id number]]     Order Status: Sent Specimen: Blood             Significant Imaging: I have reviewed all pertinent imaging results/findings within the past 24 hours.

## 2024-05-26 NOTE — PLAN OF CARE
Problem: Occupational Therapy  Goal: Occupational Therapy Goal  Description: Goals to be met by: 6/2/24    Patient will increase functional independence with ADLs by performing:    Grooming while standing at sink with Supervision.  Toileting from toilet with Supervision for hygiene and clothing management.   Supine to sit with Modified [place].  Toilet transfer to toilet with Supervision.    Outcome: Progressing

## 2024-05-26 NOTE — ASSESSMENT & PLAN NOTE
67 yo woman with chronic lymphedema, admitted with bilateral cellulitis and skin breakdown. Course complicated by likely venous stasis dermatitis.    Slight improvement in edema but her legs are still painful. Legs sticking to pad.    I placed a temporary dressing on her legs until Wound Care can address her wounds.    Recommendations  Continue adequate elevation (ankles>knees.hips)  Would treat as cellulitis and continue daptomycin for another few days (or until her edema and erythema have improved)  Consider non-stick dressing to legs

## 2024-05-26 NOTE — SUBJECTIVE & OBJECTIVE
Interval History: NAEON. BP soft, but stable. Patient reporting leg and back pain, but on exam BLE pain is improving. Trial addition of robaxin. Swelling and erythema improving. ID recommending continuing IV daptomycin. SW assisting with  referrals, however no accepting companies at this time. Will follow up on this.     Review of Systems   Constitutional:  Positive for activity change. Negative for chills and fever.   Respiratory:  Negative for chest tightness and shortness of breath.    Cardiovascular:  Positive for leg swelling. Negative for chest pain.   Gastrointestinal:  Negative for abdominal pain and nausea.   Musculoskeletal:         +falls   Skin:  Positive for color change and wound.        BLE pain   Neurological:  Positive for weakness. Negative for dizziness and light-headedness.   Psychiatric/Behavioral:  Positive for sleep disturbance.      Objective:     Vital Signs (Most Recent):  Temp: 98.2 °F (36.8 °C) (05/26/24 1215)  Pulse: 65 (05/26/24 1215)  Resp: 16 (05/26/24 1215)  BP: (!) 95/52 (05/26/24 1215)  SpO2: 97 % (05/26/24 1215) Vital Signs (24h Range):  Temp:  [97.7 °F (36.5 °C)-98.7 °F (37.1 °C)] 98.2 °F (36.8 °C)  Pulse:  [61-84] 65  Resp:  [16-18] 16  SpO2:  [96 %-100 %] 97 %  BP: ()/(52-59) 95/52     Weight: 109.5 kg (241 lb 6.5 oz)  Body mass index is 40.17 kg/m².    Intake/Output Summary (Last 24 hours) at 5/26/2024 1515  Last data filed at 5/26/2024 1456  Gross per 24 hour   Intake 740 ml   Output 4250 ml   Net -3510 ml         Physical Exam  Vitals and nursing note reviewed.   Constitutional:       Appearance: She is well-developed. She is obese. She is ill-appearing.   HENT:      Head: Normocephalic and atraumatic.   Eyes:      Extraocular Movements: Extraocular movements intact.      Pupils: Pupils are equal, round, and reactive to light.   Cardiovascular:      Rate and Rhythm: Normal rate and regular rhythm.      Pulses: Normal pulses.   Pulmonary:      Effort: Pulmonary effort  is normal.      Breath sounds: Normal breath sounds.      Comments: 4L NC (baseline)  Abdominal:      Palpations: Abdomen is soft.      Tenderness: There is no abdominal tenderness.   Musculoskeletal:         General: Swelling and tenderness present. Normal range of motion.      Right lower leg: Edema present.      Left lower leg: Edema present.      Comments: Significant 3+ pitting BLE edema. Poor skin integrity, circumferential. Skin breakdown bilateral posterior calves. Drainage significantly improving. Erythema extending feet to upper skins, improving. BLE elevated.   Skin:     General: Skin is warm.      Findings: Erythema and lesion present.   Neurological:      Mental Status: She is alert and oriented to person, place, and time.      Gait: Gait abnormal.      Comments: Patient requiring assistance from bed to chair transfer, poor stability.   Psychiatric:         Behavior: Behavior normal.             Significant Labs: All pertinent labs within the past 24 hours have been reviewed.  CBC:   Recent Labs   Lab 05/25/24  0532   WBC 4.79   HGB 10.0*   HCT 33.0*        CMP:   Recent Labs   Lab 05/25/24  0532      K 4.4      CO2 31*   *   BUN 7*   CREATININE 0.9   CALCIUM 8.6*   ANIONGAP 6*       Significant Imaging: I have reviewed all pertinent imaging results/findings within the past 24 hours.

## 2024-05-26 NOTE — PT/OT/SLP EVAL
Physical Therapy Co-Evaluation    Patient Name:  Tasha Hawley   MRN:  [id number]    Co-evaluation and co-treatment performed for this visit due to suspected patient need for two skilled therapists to ensure patient and staff safety and to accommodate for patient activity tolerance/pain management     Recommendations:     Discharge Recommendations: Low Intensity Therapy   Discharge Equipment Recommendations: none   Barriers to discharge: None    Assessment:     Tasha Hawley is a 68 y.o. female admitted with a medical diagnosis of Bilateral cellulitis of lower leg.  She presents with the following impairments/functional limitations: weakness, impaired endurance, impaired self care skills, impaired functional mobility, gait instability, impaired balance, edema Pt. cooperative and tolerated treatment fairly well. Pt. progressing with mobility.    Rehab Prognosis: Good; patient would benefit from acute skilled PT services to address these deficits and reach maximum level of function.    Recent Surgery: * No surgery found *      Plan:     During this hospitalization, patient to be seen 3 x/week to address the identified rehab impairments via gait training, therapeutic activities, therapeutic exercises and progress toward the following goals:    Plan of Care Expires:  06/25/24    Subjective     Chief Complaint: edema  Patient/Family Comments/goals: pt. Agreeable to PT  Pain/Comfort:  Pain Rating 1: 0/10  Pain Rating Post-Intervention 1: 0/10    Patients cultural, spiritual, Rastafari conflicts given the current situation: no    Living Environment:  Pt. Lives with spouse in [facility] with ramp access  Prior to admission, patients level of function was amb. With rollator.  Equipment used at home: rollator, wheelchair, oxygen, bath bench.  Upon discharge, patient will have assistance from spouse.    Objective:     Communicated with nursing prior to session.  Patient found supine with peripheral IV (suprapubic catheter)   upon PT entry to room.    General Precautions: Standard, fall  Orthopedic Precautions:N/A   Braces: N/A  Respiratory Status: Nasal cannula, flow 4 L/min    Exams:  RLE ROM: WFL  RLE Strength: WFL  LLE ROM: WFL  LLE Strength: WFL    Functional Mobility:  Bed Mobility:     Rolling Left:  stand by assistance  Scooting: stand by assistance  Supine to Sit: stand by assistance  Sit to Supine: stand by assistance  Transfers:     Sit to Stand:  contact guard assistance with rolling walker  Gait: 20' with RW and CGA with decreased step length/cj  Balance: fair      AM-PAC 6 CLICK MOBILITY  Total Score:16       Treatment & Education:  Discussed therapy needs, goals, and POC.    Patient left supine with all lines intact and call button in reach.    GOALS:   Multidisciplinary Problems       Physical Therapy Goals          Problem: Physical Therapy    Goal Priority Disciplines Outcome Goal Variances Interventions   Physical Therapy Goal     PT, PT/OT Progressing     Description: Goals to be met by: 2024     Patient will increase functional independence with mobility by performin. Supine to sit with Set-up [place]  2. Sit to supine with Set-up [place]  3. Sit to stand transfer with Supervision  4. Bed to chair transfer with Supervision using Rolling Walker  5. Gait  x 100 feet with Supervision using Rolling Walker.   6. Lower extremity exercise program x15 reps per handout, with supervision                         History:     Past Medical History:   Diagnosis Date    Anxiety     Arthritis     Bilateral lower extremity edema     severe chronic    Carotid artery occlusion     Cataract     CHF (congestive heart failure)     Coronary artery disease     subtotalled LAD with collateral    Depression     Fever blister     Hard of hearing     Hypokalemia 2023    Hyponatremia 2022    Hypothyroid     Iron deficiency anemia     Lumbar radiculopathy     with chronic pain    Ocular migraine     Other  emphysema 05/22/2023    Renal disorder     Sleep apnea     cpap       Past Surgical History:   Procedure Laterality Date    ADENOIDECTOMY      BACK SURGERY      x 12    CARDIAC CATHETERIZATION  2016    subtotalled LAD with right to left collaterals    CATARACT EXTRACTION W/  INTRAOCULAR LENS IMPLANT Left     Dr Coleman     CYSTOSCOPIC LITHOLAPAXY N/A 6/27/2019    Procedure: CYSTOLITHOLAPAXY;  Surgeon: Shireen Mayo MD;  Location: [facility] OR 2ND FLR;  Service: Urology;  Laterality: N/A;    CYSTOSCOPIC LITHOLAPAXY N/A 9/3/2019    Procedure: CYSTOLITHOLAPAXY;  Surgeon: Shireen Mayo MD;  Location: [facility] OR 2ND FLR;  Service: Urology;  Laterality: N/A;    CYSTOSCOPY N/A 7/13/2021    Procedure: CYSTOSCOPY;  Surgeon: Shireen Mayo MD;  Location: [facility] OR 1ST FLR;  Service: Urology;  Laterality: N/A;    CYSTOSCOPY  11/16/2021    Procedure: CYSTOSCOPY;  Surgeon: Shireen Maoy MD;  Location: [facility] OR 1ST FLR;  Service: Urology;;    CYSTOSCOPY  7/19/2022    Procedure: CYSTOSCOPY;  Surgeon: Shireen Mayo MD;  Location: [facility] OR 1ST FLR;  Service: Urology;;    CYSTOSCOPY WITH INJECTION OF PERIURETHRAL BULKING AGENT  7/19/2022    Procedure: CYSTOSCOPY, WITH PERIURETHRAL BULKING AGENT INJECTION-MACROPLASTIQUE;  Surgeon: Shireen Mayo MD;  Location: [facility] OR 1ST FLR;  Service: Urology;;    CYSTOSCOPY WITH INJECTION OF PERIURETHRAL BULKING AGENT N/A 3/28/2023    Procedure: CYSTOSCOPY, WITH PERIURETHRAL BULKING AGENT INJECTION;  Surgeon: Shireen Mayo MD;  Location: [facility] OR 1ST FLR;  Service: Urology;  Laterality: N/A;  [place]    CYSTOSCOPY WITH INJECTION OF PERIURETHRAL BULKING AGENT N/A 11/14/2023    Procedure: CYSTOSCOPY, WITH PERIURETHRAL BULKING AGENT INJECTION;  Surgeon: Shireen Mayo MD;  Location: [facility] OR 1ST FLR;  Service: Urology;  Laterality: N/A;    CYSTOSCOPY,WITH BOTULINUM TOXIN INJECTION N/A 12/13/2022    Procedure: CYSTOSCOPY,WITH BOTULINUM TOXIN INJECTION;  Surgeon: Shireen Mayo MD;   Location: [facility] OR 1ST FLR;  Service: Urology;  Laterality: N/A;  300 U    CYSTOSCOPY,WITH BOTULINUM TOXIN INJECTION N/A 3/28/2023    Procedure: CYSTOSCOPY,WITH BOTULINUM TOXIN INJECTION;  Surgeon: Shireen Mayo MD;  Location: [facility] OR 1ST FLR;  Service: Urology;  Laterality: N/A;  45 MIN.    300 UNITS    ESOPHAGOGASTRODUODENOSCOPY N/A 5/23/2018    Procedure: ESOPHAGOGASTRODUODENOSCOPY (EGD);  Surgeon: Prince Vance MD;  Location: [facility] ENDO (4TH FLR);  Service: Endoscopy;  Laterality: N/A;  r/s 'd per Dr. Vance due to family emergency- ER    ESOPHAGOGASTRODUODENOSCOPY N/A 6/23/2023    Procedure: EGD (ESOPHAGOGASTRODUODENOSCOPY);  Surgeon: Juaquin Barry MD;  Location: [facility] ENDO (2ND FLR);  Service: Endoscopy;  Laterality: N/A;  Refferal: PRINCE VANCE  Order  tele enc 5/18/23  dx: history of a Nissen fundoplication  Additional Scheduling Information:  On home oxygen 2nd floor for airway protection also with a pain pump elevated BMI close to 40   Prep Gastroparesis   ins    HYSTERECTOMY  1975    endometriosis    INJECTION OF BOTULINUM TOXIN TYPE A  7/13/2021    Procedure: INJECTION, BOTULINUM TOXIN, 200units;  Surgeon: Shireen Mayo MD;  [facility];  Service: Urology;;    INJECTION OF BOTULINUM TOXIN TYPE A  11/16/2021    Procedure: INJECTION, BOTULINUM TOXIN, 200units;  Surgeon: Shireen Mayo MD;  [facility];  Service: Urology;;    INJECTION OF BOTULINUM TOXIN TYPE A  7/19/2022    Procedure: INJECTION, BOTULINUM TOXIN, 300 units ;  Surgeon: Shireen Mayo MD;  [facility];  Service: Urology;;    INJECTION OF BOTULINUM TOXIN TYPE A N/A 11/14/2023    Procedure: INJECTION, BOTULINUM TOXIN, TYPE A;  Surgeon: Shireen Mayo MD;  [facility];  Service: Urology;  Laterality: N/A;    INSERTION, SUPRAPUBIC CATHETER N/A 12/13/2022    Procedure: INSERTION, SUPRAPUBIC CATHETER;  Surgeon: Shireen Mayo MD;  Location: [facility] OR 1ST FLR;   Service: Urology;  Laterality: N/A;  exchange    INSERTION, SUPRAPUBIC CATHETER N/A 11/14/2023    Procedure: INSERTION, SUPRAPUBIC CATHETER;  Surgeon: Shireen Mayo MD;  Location: [facility] OR [address] [place] [address];  Service: Urology;  Laterality: N/A;  EXCHANGE of s/p tube    pain pump placement      SQ Dilaudid Pump managed by Dr. Hillman, Pain Management    REMOVAL OF BONE SPUR OF FOOT Bilateral 9/16/2022    Procedure: EXCISION ARTHRITIC BONE, BILATERAL FOOT;  Surgeon: NICOLA Mcgrath;  Location: [facility] OR;  Service: Podiatry;  Laterality: Bilateral;    REPLACEMENT OF BACLOFEN PUMP N/A 8/2/2023    Procedure: REPLACEMENT, BACLOFEN PUMP;  Surgeon: Smitha Condon MD;  Location: [facility] OR [address] [place] [address];  Service: Neurosurgery;  Laterality: N/A;  Anes: Gen  Blood: Type & Screen  Pos: Left Lat  Intrathecal Pump  Bed: Reg Reversed  Rad: C-arm  Pump: 40 cc, [employer]    REPLACEMENT OF CATHETER N/A 10/31/2019    Procedure: REPLACEMENT, CATHETER-SUPRAPUBIC;  Surgeon: Shireen Mayo MD;  Location: [facility] OR [address] [place] [address];  Service: Urology;  Laterality: N/A;    SPINAL CORD STIMULATOR REMOVAL      before [place]    SPINE SURGERY  5-13-13    CERVICAL FUSION    TONSILLECTOMY         Time Tracking:     PT Received On: 05/26/24  PT Start Time: 1041     PT Stop Time: 1108  PT Total Time (min): 27 min     Billable Minutes: Evaluation 17 and Gait Training 10      05/26/2024

## 2024-05-26 NOTE — PLAN OF CARE
Problem: Physical Therapy  Goal: Physical Therapy Goal  Description: Goals to be met by: 2024     Patient will increase functional independence with mobility by performin. Supine to sit with Set-up [place]  2. Sit to supine with Set-up [place]  3. Sit to stand transfer with Supervision  4. Bed to chair transfer with Supervision using Rolling Walker  5. Gait  x 100 feet with Supervision using Rolling Walker.   6. Lower extremity exercise program x15 reps per handout, with supervision    Outcome: Progressing

## 2024-05-26 NOTE — PROGRESS NOTES
"[facility] - Observation 11H  Infectious Disease  Progress Note    Patient Name: Tasha Hawley  MRN: [id number]  Admission Date: 5/22/2024  Length of Stay: 3 days  Attending Physician: Rebecca Sahni MD  Primary Care Provider: Mesfin Hodges II, MD    Isolation Status: No active isolations    Assessment/Plan:      ID  * Bilateral cellulitis of lower leg  67 yo woman with chronic lymphedema, admitted with bilateral cellulitis and skin breakdown. Course complicated by likely venous stasis dermatitis.    Slight improvement in edema but her legs are still painful. Legs sticking to pad.    I placed a temporary dressing on her legs until Wound Care can address her wounds.    Recommendations  Continue adequate elevation (ankles>knees.hips)  Would treat as cellulitis and continue daptomycin for another few days (or until her edema and erythema have improved)  Consider non-stick dressing to legs    Thank you for your consult. I will sign off. Please contact us if you have any additional questions.    Andrey Galarza MD  Infectious Disease  [facility] Observation 11[facility]    Subjective:     Principal Problem:Bilateral cellulitis of lower leg    HPI: Ms. Hawley is a pleasant 68 y.o. woman admitted with cellulitis of her legs due to chronic edema. She has COPD (on 4L), HFpEF, chronic BLE lymphedema,adrenal insufficiency, diastolic HF, indwelling suprapubic catheter presents to the ED after having 6 falls. She tells me that her pump was turned doen and that her legs started to swell. When she looked down, she saw that they were very red. Her falls were due, in part, to her lymphedema. She was evaluated in the ED and started on Zyvox for cellulitis. ID is consulted for "On linezolid d/t hx of anaphylaxis from vancomycin."    Lives at home with . Daughter at bedside provided additional history.    Interval History: Patient complains of continued leg pain when legs stick to pad. No fever or chills.    Review of Systems "   Cardiovascular:  Positive for leg swelling.   Skin:  Positive for color change and wound.   All other systems reviewed and are negative.    Objective:     Vital Signs (Most Recent):  Temp: 97.7 °F (36.5 °C) (05/26/24 0831)  Pulse: 61 (05/26/24 0831)  Resp: 16 (05/26/24 0831)  BP: (!) 118/59 (05/26/24 0831)  SpO2: 98 % (05/26/24 0831) Vital Signs (24h Range):  Temp:  [97.5 °F (36.4 °C)-98.7 °F (37.1 °C)] 97.7 °F (36.5 °C)  Pulse:  [57-84] 61  Resp:  [16-18] 16  SpO2:  [96 %-100 %] 98 %  BP: ()/(52-68) 118/59     Weight: 109.5 kg (241 lb 6.5 oz)  Body mass index is 40.17 kg/m².    Estimated Creatinine Clearance: 74.7 mL/min (based on SCr of 0.9 mg/dL).     Physical Exam  Vitals and nursing note reviewed.   Constitutional:       General: She is not in acute distress.     Appearance: Normal appearance. She is not ill-appearing, toxic-appearing or diaphoretic.   HENT:      Head: Normocephalic and atraumatic.   Eyes:      Extraocular Movements: Extraocular movements intact.      Pupils: Pupils are equal, round, and reactive to light.   Musculoskeletal:         General: Swelling present.      Right lower leg: Edema present.      Left lower leg: Edema present.   Skin:     Findings: Erythema present.   Neurological:      General: No focal deficit present.      Mental Status: She is alert and oriented to person, place, and time.   Psychiatric:         Mood and Affect: Mood normal.         Behavior: Behavior normal.         Thought Content: Thought content normal.         Judgment: Judgment normal.          Significant Labs: BMP:   Recent Labs   Lab 05/25/24  0532   *      K 4.4      CO2 31*   BUN 7*   CREATININE 0.9   CALCIUM 8.6*   MG 1.9     CBC:   Recent Labs   Lab 05/25/24  0532   WBC 4.79   HGB 10.0*   HCT 33.0*        Microbiology Results (last 7 days)       Procedure Component Value Units Date/Time    Blood culture [[id number]]     Order Status: Sent Specimen: Blood     Blood culture  [[id number]]     Order Status: Sent Specimen: Blood             Significant Imaging: I have reviewed all pertinent imaging results/findings within the past 24 hours.

## 2024-05-26 NOTE — PT/OT/SLP EVAL
"Occupational Therapy   Evaluation    Name: Tasha Hawley  MRN: [id number]  Admitting Diagnosis: Bilateral cellulitis of lower leg  Recent Surgery: * No surgery found *      Recommendations:     Discharge Recommendations: Low Intensity Therapy  Discharge Equipment Recommendations:     Barriers to discharge:       Assessment:     Tasha Hawley is a 68 y.o. female with a medical diagnosis of Bilateral cellulitis of lower leg.  She presents with multiple falls at home (6+) which pt states she trips over her feet due to BLE edema causing decreased LE coordination. Pt also with c/o R shld pain sustained from a fall. Performance deficits affecting function: impaired endurance, decreased coordination, impaired self care skills, impaired functional mobility, gait instability, impaired balance.      Rehab Prognosis: Good; patient would benefit from acute skilled OT services to address these deficits and reach maximum level of function.       Plan:     Patient to be seen 3 x/week to address the above listed problems via self-care/home management, therapeutic activities, therapeutic exercises  Plan of Care Expires: 06/25/24  Plan of Care Reviewed with: patient    Subjective     Occupational Profile:  Living Environment: pt lives with  in [facility] with ramp access - has t/s combo for bathing  Previous level of function: pt reports decline - needs A with baths and LB dressing - frequent falls ("7 falls recently") and reports fractured toes on B feet  Roles and Routines: wife  Equipment Used at Home: oxygen, rollator, bedside commode (has w/c and TTB but in poor condition and needs replacement - W/C too narrow/small and TTB is unsteady)  Assistance upon Discharge: lives with , but reportedly having difficulty providing care for her    Pain/Comfort:  Pain Rating 1: 0/10    Patients cultural, spiritual, Druze conflicts given the current situation:      Objective:     Communicated with: rn prior to session.  " Patient found supine with   upon OT entry to room.    General Precautions: Standard, fall  Orthopedic Precautions:    Braces:    Respiratory Status: Nasal cannula, flow 2 L/min    Occupational Performance:    Bed Mobility:    Patient completed Scooting/Bridging with stand by assistance  Patient completed Supine to Sit with stand by assistance  Patient completed Sit to Supine with stand by assistance    Functional Mobility/Transfers:  Patient completed Sit <> Stand Transfer with contact guard assistance  with  rolling walker   Functional Mobility: Pt performed functional mobility ~ 20' CGA with a RW.    Activities of Daily Living:  Feeding:  independence   Grooming: stand by assistance seated EOB.  Lower Body Dressing: stand by assistance     Cognitive/Visual Perceptual:  Cognitive/Psychosocial Skills:     -       Oriented to: Person, Place, Time, and Situation   -       Safety awareness/insight to disability: intact     Physical Exam:  Upper Extremity Range of Motion:     -       Right Upper Extremity: WNL  -       Left Upper Extremity: WNL  Upper Extremity Strength:    -       Right Upper Extremity: WNL  -       Left Upper Extremity: WNL    AMPAC 6 Click ADL:  AMPAC Total Score: 21    Treatment & Education:  UE ROM/MMT  Bed mobility training / assessment  Functional mobility assessment  Sit/standing balance assessment  Educated on importance of sitting OOB in bedside chair to promote increased strength, endurance & breathing.  Discussed OT POC / Post-acute plan    Patient left supine with all lines intact and call button in reach    GOALS:   Multidisciplinary Problems       Occupational Therapy Goals          Problem: Occupational Therapy    Goal Priority Disciplines Outcome Interventions   Occupational Therapy Goal     OT, PT/OT Progressing    Description: Goals to be met by: 6/2/24    Patient will increase functional independence with ADLs by performing:    Grooming while standing at sink with  Supervision.  Toileting from toilet with Supervision for hygiene and clothing management.   Supine to sit with Modified [place].  Toilet transfer to toilet with Supervision.                         History:     Past Medical History:   Diagnosis Date    Anxiety     Arthritis     Bilateral lower extremity edema     severe chronic    Carotid artery occlusion     Cataract     CHF (congestive heart failure)     Coronary artery disease     subtotalled LAD with collateral    Depression     Fever blister     Hard of hearing     Hypokalemia 01/09/2023    Hyponatremia 02/04/2022    Hypothyroid     Iron deficiency anemia     Lumbar radiculopathy     with chronic pain    Ocular migraine     Other emphysema 05/22/2023    Renal disorder     Sleep apnea     cpap         Past Surgical History:   Procedure Laterality Date    ADENOIDECTOMY      BACK SURGERY      x 12    CARDIAC CATHETERIZATION  2016    subtotalled LAD with right to left collaterals    CATARACT EXTRACTION W/  INTRAOCULAR LENS IMPLANT Left     Dr Coleman     CYSTOSCOPIC LITHOLAPAXY N/A 6/27/2019    Procedure: CYSTOLITHOLAPAXY;  Surgeon: Shireen Mayo MD;  [facility];  Service: Urology;  Laterality: N/A;    CYSTOSCOPIC LITHOLAPAXY N/A 9/3/2019    Procedure: CYSTOLITHOLAPAXY;  Surgeon: Shireen Mayo MD;  Location: [facility] OR 2ND FLR;  Service: Urology;  Laterality: N/A;    CYSTOSCOPY N/A 7/13/2021    Procedure: CYSTOSCOPY;  Surgeon: Shireen Mayo MD;  Location: [facility] OR 1ST FLR;  Service: Urology;  Laterality: N/A;    CYSTOSCOPY  11/16/2021    Procedure: CYSTOSCOPY;  Surgeon: Shireen Mayo MD;  [facility];  Service: Urology;;    CYSTOSCOPY  7/19/2022    Procedure: CYSTOSCOPY;  Surgeon: Shireen Mayo MD;  [facility];  Service: Urology;;    CYSTOSCOPY WITH INJECTION OF PERIURETHRAL BULKING AGENT  7/19/2022    Procedure: CYSTOSCOPY, WITH PERIURETHRAL BULKING AGENT INJECTION-MACROPLASTIQUE;  Surgeon: Shireen PICKETT  MD Maria Esther;  [facility];  Service: Urology;;    CYSTOSCOPY WITH INJECTION OF PERIURETHRAL BULKING AGENT N/A 3/28/2023    Procedure: CYSTOSCOPY, WITH PERIURETHRAL BULKING AGENT INJECTION;  Surgeon: Shireen Mayo MD;  Location: [facility] OR [facility];  Service: Urology;  Laterality: N/A;  [place]    CYSTOSCOPY WITH INJECTION OF PERIURETHRAL BULKING AGENT N/A 11/14/2023    Procedure: CYSTOSCOPY, WITH PERIURETHRAL BULKING AGENT INJECTION;  Surgeon: Shireen Mayo MD;  [facility];  Service: Urology;  Laterality: N/A;    CYSTOSCOPY,WITH BOTULINUM TOXIN INJECTION N/A 12/13/2022    Procedure: CYSTOSCOPY,WITH BOTULINUM TOXIN INJECTION;  Surgeon: Shireen Mayo MD;  [facility];  Service: Urology;  Laterality: N/A;  300 U    CYSTOSCOPY,WITH BOTULINUM TOXIN INJECTION N/A 3/28/2023    Procedure: CYSTOSCOPY,WITH BOTULINUM TOXIN INJECTION;  Surgeon: Shireen Mayo MD;  [facility];  Service: Urology;  Laterality: N/A;  45 MIN.    300 UNITS    ESOPHAGOGASTRODUODENOSCOPY N/A 5/23/2018    Procedure: ESOPHAGOGASTRODUODENOSCOPY (EGD);  Surgeon: Prince Vance MD;  Location: [facility] (4TH FLR);  Service: Endoscopy;  Laterality: N/A;  r/s 'd per Dr. Vance due to family emergency- ER    ESOPHAGOGASTRODUODENOSCOPY N/A 6/23/2023    Procedure: EGD (ESOPHAGOGASTRODUODENOSCOPY);  Surgeon: Juaquin Barry MD;  Location: [facility] ENDO (2ND FLR);  Service: Endoscopy;  Laterality: N/A;  Refferal: PRINCE VANCE  tele enc 5/18/23  dx: history of a Nissen fundoplication  Additional Scheduling Information:  On home oxygen 2nd floor for airway protection also with a pain pump elevated BMI close to 40   Prep Gastroparesis   ins    HYSTERECTOMY  1975    endometriosis    INJECTION OF BOTULINUM TOXIN TYPE A  7/13/2021    Procedure: INJECTION, BOTULINUM TOXIN, 200units;  Surgeon: Shireen Mayo MD;  Location: [facility] OR [address] [place] [address];  Service: Urology;;    INJECTION OF BOTULINUM  TOXIN TYPE A  11/16/2021    Procedure: INJECTION, BOTULINUM TOXIN, 200units;  Surgeon: Shireen Mayo MD;  [facility];  Service: Urology;;    INJECTION OF BOTULINUM TOXIN TYPE A  7/19/2022    Procedure: INJECTION, BOTULINUM TOXIN, 300 units ;  Surgeon: Shireen Mayo MD;  [facility];  Service: Urology;;    INJECTION OF BOTULINUM TOXIN TYPE A N/A 11/14/2023    Procedure: INJECTION, BOTULINUM TOXIN, TYPE A;  Surgeon: Shireen Mayo MD;  [facility];  Service: Urology;  Laterality: N/A;    INSERTION, SUPRAPUBIC CATHETER N/A 12/13/2022    Procedure: INSERTION, SUPRAPUBIC CATHETER;  Surgeon: Shireen Mayo MD;  [facility];  Service: Urology;  Laterality: N/A;  exchange    INSERTION, SUPRAPUBIC CATHETER N/A 11/14/2023    Procedure: INSERTION, SUPRAPUBIC CATHETER;  Surgeon: Shireen Mayo MD;  Location: [facility] OR [address] [place] [address];  Service: Urology;  Laterality: N/A;  EXCHANGE of s/p tube    pain pump placement      SQ Dilaudid Pump managed by Dr. Hillman, Pain Management    REMOVAL OF BONE SPUR OF FOOT Bilateral 9/16/2022    Procedure: EXCISION ARTHRITIC BONE, BILATERAL FOOT;  Surgeon: [facility];  Location: [facility];  Service: Podiatry;  Laterality: Bilateral;    REPLACEMENT OF BACLOFEN PUMP N/A 8/2/2023    Procedure: REPLACEMENT, BACLOFEN PUMP;  Surgeon: Smitha Condon MD;  Location: [facility] OR 2ND FLR;  Service: Neurosurgery;  Laterality: N/A;  Anes: Gen  Blood: Type & Screen  Pos: Left Lat  Intrathecal Pump  Bed: Reg Reversed  Rad: C-arm  Pump: 40 cc, Executive Caddies    REPLACEMENT OF CATHETER N/A 10/31/2019    Procedure: REPLACEMENT, CATHETER-SUPRAPUBIC;  Surgeon: Shireen Mayo MD;  Location: [facility] OR [address] [place] [address];  Service: Urology;  Laterality: N/A;    SPINAL CORD STIMULATOR REMOVAL      before [place]    SPINE SURGERY  5-13-13    CERVICAL FUSION    TONSILLECTOMY         Time Tracking:     OT Date of Treatment: 05/26/24  OT Start Time: 1040  OT  Stop Time: 1107  OT Total Time (min): 27 min    Billable Minutes:Evaluation 10  Therapeutic Activity 17    5/26/2024

## 2024-05-27 ENCOUNTER — TELEPHONE (OUTPATIENT)
Dept: UROLOGY | Facility: CLINIC | Age: 69
End: 2024-05-27
Payer: MEDICARE

## 2024-05-27 LAB
ANION GAP SERPL CALC-SCNC: 8 MMOL/L (ref 8–16)
BASOPHILS # BLD AUTO: 0.02 K/UL (ref 0–0.2)
BASOPHILS NFR BLD: 0.4 % (ref 0–1.9)
BUN SERPL-MCNC: 10 MG/DL (ref 8–23)
CALCIUM SERPL-MCNC: 8.9 MG/DL (ref 8.7–10.5)
CHLORIDE SERPL-SCNC: 99 MMOL/L (ref 95–110)
CO2 SERPL-SCNC: 35 MMOL/L (ref 23–29)
CREAT SERPL-MCNC: 0.9 MG/DL (ref 0.5–1.4)
DIFFERENTIAL METHOD BLD: ABNORMAL
EOSINOPHIL # BLD AUTO: 0.6 K/UL (ref 0–0.5)
EOSINOPHIL NFR BLD: 12.3 % (ref 0–8)
ERYTHROCYTE [DISTWIDTH] IN BLOOD BY AUTOMATED COUNT: 13.2 % (ref 11.5–14.5)
EST. GFR  (NO RACE VARIABLE): >60 ML/MIN/1.73 M^2
GLUCOSE SERPL-MCNC: 86 MG/DL (ref 70–110)
HCT VFR BLD AUTO: 37 % (ref 37–48.5)
HGB BLD-MCNC: 10.7 G/DL (ref 12–16)
IMM GRANULOCYTES # BLD AUTO: 0.01 K/UL (ref 0–0.04)
IMM GRANULOCYTES NFR BLD AUTO: 0.2 % (ref 0–0.5)
LYMPHOCYTES # BLD AUTO: 1 K/UL (ref 1–4.8)
LYMPHOCYTES NFR BLD: 20 % (ref 18–48)
MAGNESIUM SERPL-MCNC: 2.1 MG/DL (ref 1.6–2.6)
MCH RBC QN AUTO: 27 PG (ref 27–31)
MCHC RBC AUTO-ENTMCNC: 28.9 G/DL (ref 32–36)
MCV RBC AUTO: 93 FL (ref 82–98)
MONOCYTES # BLD AUTO: 0.5 K/UL (ref 0.3–1)
MONOCYTES NFR BLD: 10.4 % (ref 4–15)
NEUTROPHILS # BLD AUTO: 2.9 K/UL (ref 1.8–7.7)
NEUTROPHILS NFR BLD: 56.7 % (ref 38–73)
NRBC BLD-RTO: 0 /100 WBC
PLATELET # BLD AUTO: 243 K/UL (ref 150–450)
PMV BLD AUTO: 9.9 FL (ref 9.2–12.9)
POCT GLUCOSE: 109 MG/DL (ref 70–110)
POTASSIUM SERPL-SCNC: 4.7 MMOL/L (ref 3.5–5.1)
RBC # BLD AUTO: 3.96 M/UL (ref 4–5.4)
SODIUM SERPL-SCNC: 142 MMOL/L (ref 136–145)
WBC # BLD AUTO: 5.11 K/UL (ref 3.9–12.7)

## 2024-05-27 PROCEDURE — 85025 COMPLETE CBC W/AUTO DIFF WBC: CPT | Mod: HCNC

## 2024-05-27 PROCEDURE — 25000003 PHARM REV CODE 250: Mod: HCNC | Performed by: INTERNAL MEDICINE

## 2024-05-27 PROCEDURE — 94640 AIRWAY INHALATION TREATMENT: CPT | Mod: HCNC

## 2024-05-27 PROCEDURE — 25000003 PHARM REV CODE 250: Mod: HCNC

## 2024-05-27 PROCEDURE — 63600175 PHARM REV CODE 636 W HCPCS: Mod: HCNC | Performed by: INTERNAL MEDICINE

## 2024-05-27 PROCEDURE — 83735 ASSAY OF MAGNESIUM: CPT | Mod: HCNC

## 2024-05-27 PROCEDURE — 25000003 PHARM REV CODE 250: Mod: HCNC | Performed by: STUDENT IN AN ORGANIZED HEALTH CARE EDUCATION/TRAINING PROGRAM

## 2024-05-27 PROCEDURE — 94761 N-INVAS EAR/PLS OXIMETRY MLT: CPT | Mod: HCNC

## 2024-05-27 PROCEDURE — 80048 BASIC METABOLIC PNL TOTAL CA: CPT | Mod: HCNC

## 2024-05-27 PROCEDURE — 21400001 HC TELEMETRY ROOM: Mod: HCNC

## 2024-05-27 PROCEDURE — 36415 COLL VENOUS BLD VENIPUNCTURE: CPT | Mod: HCNC

## 2024-05-27 PROCEDURE — 63600175 PHARM REV CODE 636 W HCPCS: Mod: HCNC

## 2024-05-27 RX ORDER — HYDROCORTISONE 5 MG/1
5 TABLET ORAL
Qty: 10 TABLET | Refills: 0 | Status: SHIPPED | OUTPATIENT
Start: 2024-05-27 | End: 2024-05-31 | Stop reason: SDUPTHER

## 2024-05-27 RX ORDER — HYDROCORTISONE 10 MG/1
10 TABLET ORAL DAILY
Qty: 10 TABLET | Refills: 0 | Status: SHIPPED | OUTPATIENT
Start: 2024-05-28 | End: 2024-05-31 | Stop reason: SDUPTHER

## 2024-05-27 RX ORDER — ACETAMINOPHEN 500 MG
1000 TABLET ORAL EVERY 12 HOURS PRN
Qty: 40 TABLET | Refills: 0 | Status: SHIPPED | OUTPATIENT
Start: 2024-05-27 | End: 2024-06-06

## 2024-05-27 RX ORDER — METHOCARBAMOL 500 MG/1
500 TABLET, FILM COATED ORAL 4 TIMES DAILY PRN
Qty: 30 TABLET | Refills: 0 | Status: SHIPPED | OUTPATIENT
Start: 2024-05-27 | End: 2024-06-06

## 2024-05-27 RX ORDER — DOXYCYCLINE 100 MG/1
100 CAPSULE ORAL EVERY 12 HOURS
Qty: 20 CAPSULE | Refills: 0 | Status: SHIPPED | OUTPATIENT
Start: 2024-05-27 | End: 2024-06-06

## 2024-05-27 RX ORDER — FUROSEMIDE 40 MG/1
40 TABLET ORAL DAILY
Qty: 90 TABLET | Refills: 1 | Status: SHIPPED | OUTPATIENT
Start: 2024-05-27

## 2024-05-27 RX ORDER — QUETIAPINE FUMARATE 50 MG/1
50 TABLET, FILM COATED ORAL NIGHTLY
Qty: 60 TABLET | Refills: 0 | Status: SHIPPED | OUTPATIENT
Start: 2024-05-27

## 2024-05-27 RX ADMIN — ENOXAPARIN SODIUM 40 MG: 40 INJECTION SUBCUTANEOUS at 05:05

## 2024-05-27 RX ADMIN — TIOTROPIUM BROMIDE INHALATION SPRAY 2 PUFF: 3.12 SPRAY, METERED RESPIRATORY (INHALATION) at 08:05

## 2024-05-27 RX ADMIN — ATORVASTATIN CALCIUM 80 MG: 40 TABLET, FILM COATED ORAL at 09:05

## 2024-05-27 RX ADMIN — DAPTOMYCIN 775 MG: 350 INJECTION, POWDER, LYOPHILIZED, FOR SOLUTION INTRAVENOUS at 12:05

## 2024-05-27 RX ADMIN — AMITRIPTYLINE HYDROCHLORIDE 25 MG: 25 TABLET, FILM COATED ORAL at 09:05

## 2024-05-27 RX ADMIN — QUETIAPINE FUMARATE 50 MG: 25 TABLET ORAL at 09:05

## 2024-05-27 RX ADMIN — DULOXETINE HYDROCHLORIDE 60 MG: 30 CAPSULE, DELAYED RELEASE ORAL at 09:05

## 2024-05-27 RX ADMIN — FLUDROCORTISONE ACETATE 50 MCG: 0.1 TABLET ORAL at 09:05

## 2024-05-27 RX ADMIN — HYDROCORTISONE 10 MG: 5 TABLET ORAL at 04:05

## 2024-05-27 RX ADMIN — METHOCARBAMOL 500 MG: 500 TABLET ORAL at 08:05

## 2024-05-27 RX ADMIN — HYDROCODONE BITARTRATE AND ACETAMINOPHEN 1 TABLET: 5; 325 TABLET ORAL at 08:05

## 2024-05-27 RX ADMIN — HYDROCORTISONE 10 MG: 5 TABLET ORAL at 09:05

## 2024-05-27 RX ADMIN — FUROSEMIDE 40 MG: 40 TABLET ORAL at 09:05

## 2024-05-27 RX ADMIN — LEVOTHYROXINE SODIUM 150 MCG: 150 TABLET ORAL at 05:05

## 2024-05-27 RX ADMIN — SENNOSIDES 2 TABLET: 8.6 TABLET, FILM COATED ORAL at 09:05

## 2024-05-27 RX ADMIN — ONDANSETRON 8 MG: 8 TABLET, ORALLY DISINTEGRATING ORAL at 06:05

## 2024-05-27 RX ADMIN — HYDROCORTISONE 5 MG: 5 TABLET ORAL at 05:05

## 2024-05-27 RX ADMIN — PANTOPRAZOLE SODIUM 40 MG: 40 TABLET, DELAYED RELEASE ORAL at 09:05

## 2024-05-27 RX ADMIN — HYDROCODONE BITARTRATE AND ACETAMINOPHEN 1 TABLET: 5; 325 TABLET ORAL at 09:05

## 2024-05-27 RX ADMIN — DOCUSATE SODIUM 200 MG: 50 CAPSULE, LIQUID FILLED ORAL at 09:05

## 2024-05-27 NOTE — PLAN OF CARE
Problem: Adult Inpatient Plan of Care  Goal: Plan of Care Review  5/27/2024 0510 by Nicolette Conn LPN  Outcome: Progressing  5/27/2024 0510 by Nicolette Conn LPN  Outcome: Progressing     Problem: Infection  Goal: Absence of Infection Signs and Symptoms  5/27/2024 0510 by Nicolette Conn LPN  Outcome: Progressing  5/27/2024 0510 by Nicolette Conn LPN  Outcome: Progressing     Problem: Wound  Goal: Optimal Coping  5/27/2024 0510 by Nicolette Conn LPN  Outcome: Progressing  5/27/2024 0510 by Nicolette Conn LPN  Outcome: Progressing     Problem: Skin Injury Risk Increased  Goal: Skin Health and Integrity  5/27/2024 0510 by Nicolette Conn LPN  Outcome: Progressing  5/27/2024 0510 by Nicolette Conn LPN  Outcome: Progressing     Problem: Bariatric Environmental Safety  Goal: Safety Maintained with Care  5/27/2024 0510 by Nicolette Conn LPN  Outcome: Progressing  5/27/2024 0510 by Nicolette Conn LPN  Outcome: Progressing

## 2024-05-27 NOTE — NURSING
Nurses Note -- 4 Eyes      5/27/2024   6:28 PM      Skin assessed during: Q Shift Change      [] No Altered Skin Integrity Present    []Prevention Measures Documented      [x] Yes- Altered Skin Integrity Present or Discovered   [] LDA Added if Not in Epic (Describe Wound)   [x] New Altered Skin Integrity was Present on Admit and Documented in LDA   [x] Wound Image Taken    Wound Care Consulted? Yes    Attending Nurse:  Juan Ayala RN/Staff Member:   Zeenat

## 2024-05-27 NOTE — SUBJECTIVE & OBJECTIVE
Interval History: NAEON. BP soft, but stable. Plan for transition to PO abx today and discharge home. However, pt called in discharge appeal. Awaiting verdict. Continue IV dapto.     Review of Systems   Constitutional:  Positive for activity change. Negative for chills and fever.   Respiratory:  Negative for chest tightness and shortness of breath.    Cardiovascular:  Positive for leg swelling. Negative for chest pain.   Gastrointestinal:  Negative for abdominal pain and nausea.   Musculoskeletal:  Positive for back pain.        +falls   Skin:  Positive for color change and wound.        BLE pain   Neurological:  Positive for weakness. Negative for dizziness and light-headedness.   Psychiatric/Behavioral:  Positive for sleep disturbance.      Objective:     Vital Signs (Most Recent):  Temp: 97.7 °F (36.5 °C) (05/27/24 1128)  Pulse: 63 (05/27/24 1128)  Resp: 18 (05/27/24 1128)  BP: (!) 107/52 (05/27/24 1128)  SpO2: 97 % (05/27/24 1128) Vital Signs (24h Range):  Temp:  [97 °F (36.1 °C)-98.1 °F (36.7 °C)] 97.7 °F (36.5 °C)  Pulse:  [58-65] 63  Resp:  [16-20] 18  SpO2:  [94 %-100 %] 97 %  BP: ()/(51-63) 107/52     Weight: 109.5 kg (241 lb 6.5 oz)  Body mass index is 40.17 kg/m².    Intake/Output Summary (Last 24 hours) at 5/27/2024 1438  Last data filed at 5/27/2024 0807  Gross per 24 hour   Intake --   Output 3500 ml   Net -3500 ml         Physical Exam  Vitals and nursing note reviewed.   Constitutional:       Appearance: She is well-developed. She is obese. She is ill-appearing.   HENT:      Head: Normocephalic and atraumatic.   Eyes:      Extraocular Movements: Extraocular movements intact.      Pupils: Pupils are equal, round, and reactive to light.   Cardiovascular:      Rate and Rhythm: Normal rate and regular rhythm.      Pulses: Normal pulses.   Pulmonary:      Effort: Pulmonary effort is normal.      Breath sounds: Normal breath sounds.      Comments: 4L NC (baseline)  Abdominal:      Palpations: Abdomen  is soft.      Tenderness: There is no abdominal tenderness.   Musculoskeletal:         General: Swelling and tenderness present. Normal range of motion.      Right lower leg: Edema present.      Left lower leg: Edema present.      Comments: Significant 3+ pitting BLE edema. Poor skin integrity, circumferential. Skin breakdown bilateral posterior calves. Serosanguineous/ bloody drainage. Erythema extending feet to upper skins, improving. BLE elevated.     Light gauze dressing soaked through at site of posterior wounds. Dressings replaced.    Skin:     General: Skin is warm.      Findings: Erythema and lesion present.   Neurological:      Mental Status: She is alert and oriented to person, place, and time.      Gait: Gait abnormal.      Comments: Patient requiring assistance from bed to chair transfer, poor stability.   Psychiatric:         Behavior: Behavior normal.             Significant Labs: All pertinent labs within the past 24 hours have been reviewed.  CBC:   Recent Labs   Lab 05/27/24  0839   WBC 5.11   HGB 10.7*   HCT 37.0        CMP:   Recent Labs   Lab 05/27/24  0839      K 4.7   CL 99   CO2 35*   GLU 86   BUN 10   CREATININE 0.9   CALCIUM 8.9   ANIONGAP 8       Significant Imaging: I have reviewed all pertinent imaging results/findings within the past 24 hours.

## 2024-05-27 NOTE — PROGRESS NOTES
"[facility] - Observation [address] [place] [address] Medicine  Progress Note    Patient Name: Tasha Hawley  MRN: [id number]  Patient Class: IP- Inpatient   Admission Date: 5/22/2024  Length of Stay: 4 days  Attending Physician: Rebecca Sahni MD  Primary Care Provider: Mesfin Hodges II, MD        Subjective:     Principal Problem:Bilateral cellulitis of lower leg        HPI:  Tasha Hawley is a 68 y.o. female with a history of COPD (on 4L), HFpEF, chronic BLE lymphedema,adrenal insufficiency, diastolic HF, indwelling suprapubic catheter presents to the ED after having 6 falls yesterday. Patient is a poor historian and lethargic on exam. Awakens to verbal stimuli. Oriented to person and place, but unable to provide any history on my interview. She had received IV morphine a couple hours prior to this. Per ED provider: "but reports falling onto her R shoulder multiple times yesterday a may have hit her head. Pt reports resultant R shoulder pain with movement of RUE. Additionally, pt reports having worsening BLE pain/swelling for the last few days despite compliance with Lasix 80mg TID. However, she drinks 10-12 cans of 7up daily and does not follow any low sodium diet. Discussed with pt daughter about her condition, pt was discharged from SNF recently and has since deteriorated and has not been taking care of herself. Pt daughter unsure of her mother's medication compliance. Additionally, there have been issues securing home health services due to insurance issues."    Called daughter Lisa, who reports that sometimes her mom becomes very lethargic like this. States that she is unsure if her mom is taking medication appropriately. Patient lives in a  home with her  who is in his 80s and is unable to help care for her. Lisa states that her mom was doing very well after SNF last month, but she thinks when HH came by that it took her mom too long to get to the door so they left. She has significantly declined over the " last month. Lisa is considering NH placement and would like to discuss further with SW/CM.     ED: VSS, satting well on home 3L. CBC with chronic stable anemia. CMP unremarkable. BNP 27, troponin <0.006. R shoulder xray negative for acute fracture or dislocation. EKG in NSR. CT head pending.     Overview/Hospital Course:  Tasha Hawley was placed in  observation for acute encephalopathy. Suspected due to polypharmacy, however infectious encephalopathy possible. Patient found to have significant BLE swelling with skin breakdown and erythema concerning for cellulitis. Started linezolid, ID consulted and recommending switch to IV dapto - slow but steady improvement in swelling and erythema. Wound care recommending both legs to be left open to air vs non-stick dressings. Fluid removal with IV diuresis, but stopped due to hypotension. Will continue home dose of PO lasix. Suspect uncontrolled hypothyroidism due to poor med compliance, resuming home dose of synthroid 150. CM assisting with dc planning - likely needing [facility] NH placement. Will attempt HH services in the interim. Patient determined to be medically ready for discharge with plan for transition to oral antibiotics on 5/27. Patient appealing her discharge - verdict pending. Will continue IV dapto for now per ID latest recommendations.     Interval History: NAEON. BP soft, but stable. Plan for transition to PO abx today and discharge home. However, pt called in discharge appeal. Awaiting verdict. Continue IV dapto.     Review of Systems   Constitutional:  Positive for activity change. Negative for chills and fever.   Respiratory:  Negative for chest tightness and shortness of breath.    Cardiovascular:  Positive for leg swelling. Negative for chest pain.   Gastrointestinal:  Negative for abdominal pain and nausea.   Musculoskeletal:  Positive for back pain.        +falls   Skin:  Positive for color change and wound.        BLE pain   Neurological:   Positive for weakness. Negative for dizziness and light-headedness.   Psychiatric/Behavioral:  Positive for sleep disturbance.      Objective:     Vital Signs (Most Recent):  Temp: 97.7 °F (36.5 °C) (05/27/24 1128)  Pulse: 63 (05/27/24 1128)  Resp: 18 (05/27/24 1128)  BP: (!) 107/52 (05/27/24 1128)  SpO2: 97 % (05/27/24 1128) Vital Signs (24h Range):  Temp:  [97 °F (36.1 °C)-98.1 °F (36.7 °C)] 97.7 °F (36.5 °C)  Pulse:  [58-65] 63  Resp:  [16-20] 18  SpO2:  [94 %-100 %] 97 %  BP: ()/(51-63) 107/52     Weight: 109.5 kg (241 lb 6.5 oz)  Body mass index is 40.17 kg/m².    Intake/Output Summary (Last 24 hours) at 5/27/2024 1438  Last data filed at 5/27/2024 0807  Gross per 24 hour   Intake --   Output 3500 ml   Net -3500 ml         Physical Exam  Vitals and nursing note reviewed.   Constitutional:       Appearance: She is well-developed. She is obese. She is ill-appearing.   HENT:      Head: Normocephalic and atraumatic.   Eyes:      Extraocular Movements: Extraocular movements intact.      Pupils: Pupils are equal, round, and reactive to light.   Cardiovascular:      Rate and Rhythm: Normal rate and regular rhythm.      Pulses: Normal pulses.   Pulmonary:      Effort: Pulmonary effort is normal.      Breath sounds: Normal breath sounds.      Comments: 4L NC (baseline)  Abdominal:      Palpations: Abdomen is soft.      Tenderness: There is no abdominal tenderness.   Musculoskeletal:         General: Swelling and tenderness present. Normal range of motion.      Right lower leg: Edema present.      Left lower leg: Edema present.      Comments: Significant 3+ pitting BLE edema. Poor skin integrity, circumferential. Skin breakdown bilateral posterior calves. Serosanguineous/ bloody drainage. Erythema extending feet to upper skins, improving. BLE elevated.     Light gauze dressing soaked through at site of posterior wounds. Dressings replaced.    Skin:     General: Skin is warm.      Findings: Erythema and lesion  present.   Neurological:      Mental Status: She is alert and oriented to person, place, and time.      Gait: Gait abnormal.      Comments: Patient requiring assistance from bed to chair transfer, poor stability.   Psychiatric:         Behavior: Behavior normal.             Significant Labs: All pertinent labs within the past 24 hours have been reviewed.  CBC:   Recent Labs   Lab 05/27/24  0839   WBC 5.11   HGB 10.7*   HCT 37.0        CMP:   Recent Labs   Lab 05/27/24  0839      K 4.7   CL 99   CO2 35*   GLU 86   BUN 10   CREATININE 0.9   CALCIUM 8.9   ANIONGAP 8       Significant Imaging: I have reviewed all pertinent imaging results/findings within the past 24 hours.    Assessment/Plan:      Encephalopathy, metabolic  - Patient has acute metabolic encephalopathy unsure of etiology on admission  - Labs with unremarkable   - UA, CXR, lactic and blood cultures pending   -UA noninfectious    -CXR reviewed - edema v consolidation (will assess response to diuresis)   -LA wnl    -blood cultures pending    -0/4 SIRS  - CT head without acute intracranial findings  - VBG stable - c/w COPD   - bilateral LE with redness, start emperic linezolid for suspected cellulitis   - Treat the underlying cause and monitor for improvement  - Monitor with q4 neuro checks  - Avoid narcotics and benzos that will exacerbate agitation, and use PRN anti-psychotics for controls of behavior for self harm.    - ddx: infection, uncontrolled hypothyroidism, polypharmacy, medication mismanagement, hypercapnia, worsening dementia, delirium  - Delirium precautions  --> Encephalopathy resolved 5/23  --> will address polypharmacy, continue cellulitis management, discuss dispo with family and CM      Insomnia  - HOLD amitriptyline, trazodone and seroquel as may be contributing to encephalopathy    --> resuming amitriptyline, seroquel. Decrease seroquel dose 100 -> 50 nightly.  --> DC trazodone - polypharmacy.    COPD (chronic obstructive  "pulmonary disease)  Patient's COPD is controlled currently.  Patient is currently off COPD Pathway. Continue Supplemental oxygen and monitor respiratory status closely.   - on 4L at home     Adrenal insufficiency  - continue fludrocortisone and hydrocortisone   -> endocrine planning for outpatient follow up testing      Chronic diastolic heart failure  - CXR reviewed: "Increased parenchymal attenuation projects over the left lower lung zone, may reflect atelectasis however developing consolidation not excluded."  - BNP 27  - troponin <0.006  Results for orders placed during the hospital encounter of 05/08/24    Echo    Interpretation Summary    Left Ventricle: The left ventricle is normal in size. Normal wall thickness. There is normal systolic function. Biplane (2D) method of discs ejection fraction is 69%. Grade I diastolic dysfunction.    Right Ventricle: Normal right ventricular cavity size. Systolic function is normal. TAPSE is 3.66 cm.    Normal left atrial size. The left atrium volume index is 28.1 mL/m2.    Normal right atrial size.    The aortic valve is structurally normal. There is normal leaflet mobility.    The mitral valve is structurally normal. There is normal leaflet mobility.    The tricuspid valve is structurally normal. There is normal leaflet mobility.        Pure hypercholesterolemia  - continue statin       Lymphedema of both lower extremities  Cellulitis     - acute on chronic lymphedema   - 80mg of IV lasix given in the ED   - bilateral LE US ordered - neg for DVT  - monitor response and can continue to diureses as needed    - 2L output with 80 IV lasix x1   - decrease to 20 IV BID and monitor response --> discontinued 5/24 for hypotension    -> resume home lasix 40 daily   - wound care consulted  - strict I/Os   - fluid restriction   - concern for cellulitis-- start linezolid q12 hrs as she has a hx of anaphalaxis to vancomycin   - ID consulted for approval      --> switched to IV dapto per " ID recs. Monitor response.      Hypothyroidism (acquired)  - continue synthroid -- unsure if patient has been compliant though  - TSH 26.179, T4 0.69  - consult endocrine   --> likely 2/2 noncompliance. Restart synthroid.    Frequent falls  Physical deconditioning   Hemiplegia and hemiparesis following cerebral infarction affecting left non-dominant side  - recent stay in SNF, has not been able to take care of herself at home  - CM will discuss additional resources following d/c - likely needing new assisted NH placement  - CT head without acute findings  - fall precautions   - consider PT/OT consult but not likely a candidate as she was just discharged   --> Due to extended stay, will consult therapy for maintenance     Neurogenic bladder  Suprapubic catheter  - darifenacin not on formulary here, will start oxybutynin     --> discontinue, pt has catheter and is not taking med at home. Managing polypharmacy.    Severe obesity (BMI 35.0-39.9) with comorbidity  Body mass index is 35.45 kg/m². Morbid obesity complicates all aspects of disease management from diagnostic modalities to treatment. Weight loss encouraged and health benefits explained to patient.         Iron deficiency anemia  Patient's anemia is currently controlled. Has not received any PRBCs to date. Etiology likely d/t Iron deficiency  Current CBC reviewed-   Lab Results   Component Value Date    HGB 10.6 (L) 05/22/2024    HCT 34.4 (L) 05/22/2024     Monitor serial CBC and transfuse if patient becomes hemodynamically unstable, symptomatic or H/H drops below 7/21.    Sleep apnea  - CPAP QHS      Generalized anxiety disorder  - on amitriptyline, cymbalta  - holding in the setting of acute AMS of undetermined source   --> resume and monitor  - discontinue hydroxyzine. Not taking.        VTE Risk Mitigation (From admission, onward)           Ordered     enoxaparin injection 40 mg  Daily         05/22/24 1540     IP VTE HIGH RISK PATIENT  Once          05/22/24 1540                    Discharge Planning   JACKIE: 5/27/2024     Code Status: Full Code   Is the patient medically ready for discharge?:     Reason for patient still in hospital (select all that apply): Pending disposition  Discharge Plan A: Home Health                  Marjan Cervantes PA-C  Department of Hospital Medicine   [facility] - Observation 11H

## 2024-05-27 NOTE — PLAN OF CARE
05/27/24 1106   Post-Acute Status   Post-Acute Authorization Home Health   Home Health Status Pending post-acute provider review/more information requested     SW received follow up from escalation regarding home health acceptance, and was informed that pt's insurance is still attempting to find an accepting agency.    SW provided pt's daughter, Lisa with the update.    GERSON faxed referral for wound care to [facility] at [phone] for them to review for assistance with pt's wound care needs.    GERSON will continue to follow up.    UPDATE    SW received notification that pt is appealing her discharge. GERSON uploaded required documentation to [facility] for review.      Cher Guo LMSW  [facility]  Ext. [phone]

## 2024-05-28 LAB — ACTH PLAS-MCNC: 45 PG/ML (ref 0–46)

## 2024-05-28 PROCEDURE — 25000003 PHARM REV CODE 250: Mod: HCNC

## 2024-05-28 PROCEDURE — 63600175 PHARM REV CODE 636 W HCPCS: Mod: HCNC

## 2024-05-28 PROCEDURE — 27000221 HC OXYGEN, UP TO 24 HOURS: Mod: HCNC

## 2024-05-28 PROCEDURE — 94761 N-INVAS EAR/PLS OXIMETRY MLT: CPT | Mod: HCNC

## 2024-05-28 PROCEDURE — 21400001 HC TELEMETRY ROOM: Mod: HCNC

## 2024-05-28 PROCEDURE — 25000003 PHARM REV CODE 250: Mod: HCNC | Performed by: STUDENT IN AN ORGANIZED HEALTH CARE EDUCATION/TRAINING PROGRAM

## 2024-05-28 PROCEDURE — 99900035 HC TECH TIME PER 15 MIN (STAT): Mod: HCNC

## 2024-05-28 PROCEDURE — 94640 AIRWAY INHALATION TREATMENT: CPT | Mod: HCNC

## 2024-05-28 PROCEDURE — 25000003 PHARM REV CODE 250: Mod: HCNC | Performed by: INTERNAL MEDICINE

## 2024-05-28 PROCEDURE — 97116 GAIT TRAINING THERAPY: CPT | Mod: HCNC,CQ

## 2024-05-28 PROCEDURE — 97530 THERAPEUTIC ACTIVITIES: CPT | Mod: HCNC,CQ

## 2024-05-28 PROCEDURE — 63600175 PHARM REV CODE 636 W HCPCS: Mod: HCNC | Performed by: INTERNAL MEDICINE

## 2024-05-28 PROCEDURE — 97530 THERAPEUTIC ACTIVITIES: CPT | Mod: HCNC

## 2024-05-28 PROCEDURE — 97535 SELF CARE MNGMENT TRAINING: CPT | Mod: HCNC

## 2024-05-28 RX ORDER — AMOXICILLIN 250 MG
1 CAPSULE ORAL 2 TIMES DAILY
Status: DISCONTINUED | OUTPATIENT
Start: 2024-05-28 | End: 2024-05-29 | Stop reason: HOSPADM

## 2024-05-28 RX ORDER — ACETAMINOPHEN 325 MG/1
650 TABLET ORAL EVERY 6 HOURS PRN
Status: DISCONTINUED | OUTPATIENT
Start: 2024-05-28 | End: 2024-05-29 | Stop reason: HOSPADM

## 2024-05-28 RX ORDER — POLYETHYLENE GLYCOL 3350 17 G/17G
17 POWDER, FOR SOLUTION ORAL DAILY
Status: DISCONTINUED | OUTPATIENT
Start: 2024-05-28 | End: 2024-05-28

## 2024-05-28 RX ORDER — POLYETHYLENE GLYCOL 3350 17 G/17G
17 POWDER, FOR SOLUTION ORAL 2 TIMES DAILY
Status: DISCONTINUED | OUTPATIENT
Start: 2024-05-28 | End: 2024-05-29 | Stop reason: HOSPADM

## 2024-05-28 RX ADMIN — AMITRIPTYLINE HYDROCHLORIDE 25 MG: 25 TABLET, FILM COATED ORAL at 08:05

## 2024-05-28 RX ADMIN — ENOXAPARIN SODIUM 40 MG: 40 INJECTION SUBCUTANEOUS at 05:05

## 2024-05-28 RX ADMIN — SENNOSIDES AND DOCUSATE SODIUM 1 TABLET: 50; 8.6 TABLET ORAL at 08:05

## 2024-05-28 RX ADMIN — HYDROCODONE BITARTRATE AND ACETAMINOPHEN 1 TABLET: 5; 325 TABLET ORAL at 01:05

## 2024-05-28 RX ADMIN — DULOXETINE HYDROCHLORIDE 60 MG: 30 CAPSULE, DELAYED RELEASE ORAL at 08:05

## 2024-05-28 RX ADMIN — ATORVASTATIN CALCIUM 80 MG: 40 TABLET, FILM COATED ORAL at 09:05

## 2024-05-28 RX ADMIN — HYDROCORTISONE 5 MG: 5 TABLET ORAL at 04:05

## 2024-05-28 RX ADMIN — TIOTROPIUM BROMIDE INHALATION SPRAY 2 PUFF: 3.12 SPRAY, METERED RESPIRATORY (INHALATION) at 09:05

## 2024-05-28 RX ADMIN — ONDANSETRON 8 MG: 8 TABLET, ORALLY DISINTEGRATING ORAL at 11:05

## 2024-05-28 RX ADMIN — LEVOTHYROXINE SODIUM 150 MCG: 150 TABLET ORAL at 05:05

## 2024-05-28 RX ADMIN — PANTOPRAZOLE SODIUM 40 MG: 40 TABLET, DELAYED RELEASE ORAL at 08:05

## 2024-05-28 RX ADMIN — HYDROCODONE BITARTRATE AND ACETAMINOPHEN 1 TABLET: 5; 325 TABLET ORAL at 02:05

## 2024-05-28 RX ADMIN — QUETIAPINE FUMARATE 50 MG: 25 TABLET ORAL at 08:05

## 2024-05-28 RX ADMIN — HYDROCODONE BITARTRATE AND ACETAMINOPHEN 1 TABLET: 5; 325 TABLET ORAL at 08:05

## 2024-05-28 RX ADMIN — SENNOSIDES 2 TABLET: 8.6 TABLET, FILM COATED ORAL at 08:05

## 2024-05-28 RX ADMIN — DOCUSATE SODIUM 200 MG: 50 CAPSULE, LIQUID FILLED ORAL at 08:05

## 2024-05-28 RX ADMIN — FLUDROCORTISONE ACETATE 50 MCG: 0.1 TABLET ORAL at 08:05

## 2024-05-28 RX ADMIN — METHOCARBAMOL 500 MG: 500 TABLET ORAL at 11:05

## 2024-05-28 RX ADMIN — POLYETHYLENE GLYCOL 3350 17 G: 17 POWDER, FOR SOLUTION ORAL at 11:05

## 2024-05-28 RX ADMIN — HYDROCORTISONE 10 MG: 5 TABLET ORAL at 09:05

## 2024-05-28 RX ADMIN — DAPTOMYCIN 775 MG: 350 INJECTION, POWDER, LYOPHILIZED, FOR SOLUTION INTRAVENOUS at 12:05

## 2024-05-28 RX ADMIN — ONDANSETRON 8 MG: 8 TABLET, ORALLY DISINTEGRATING ORAL at 02:05

## 2024-05-28 RX ADMIN — SENNOSIDES AND DOCUSATE SODIUM 1 TABLET: 50; 8.6 TABLET ORAL at 11:05

## 2024-05-28 NOTE — PT/OT/SLP PROGRESS
"Physical Therapy Treatment    Patient Name:  Tasha Hawley   MRN:  [id number]    Recommendations:     Discharge Recommendations: Low Intensity Therapy  Discharge Equipment Recommendations: none  Barriers to discharge: None    Assessment:     Tasha Hawley is a 68 y.o. female admitted with a medical diagnosis of Bilateral cellulitis of lower leg.  She presents with the following impairments/functional limitations: weakness, impaired endurance, impaired self care skills, impaired functional mobility, gait instability, impaired balance, edema.    Pt agreeable to session at this time. Pt tolerates session well with emphasis on bed mobility, transfers, and gait training. Pt making progress towards therapy goals as indicated by increased total ambulation distance. Pt will continue to benefit from skilled therapy services.    Rehab Prognosis: Good; patient would benefit from acute skilled PT services to address these deficits and reach maximum level of function.    Recent Surgery: * No surgery found *      Plan:     During this hospitalization, patient to be seen 3 x/week to address the identified rehab impairments via gait training, therapeutic activities, therapeutic exercises and progress toward the following goals:    Plan of Care Expires:  06/25/24    Subjective     Chief Complaint: 9/10 pain in BLEs and head pain  Patient/Family Comments/goals: "I can't stand straight I have chronic back pain, I've had 12 surgeries"  Pain/Comfort:  Pain Rating 1: 9/10  Location - Side 1: Bilateral  Location - Orientation 1: generalized  Location 1: leg  Pain Addressed 1: Reposition, Distraction, Nurse notified  Pain Rating Post-Intervention 1: 9/10  Pain Rating 2: 9/10  Location - Orientation 2: generalized  Location 2: head  Pain Addressed 2: Reposition, Distraction  Pain Rating Post-Intervention 2: 9/10      Objective:     Communicated with RN Alex) prior to session.  Patient found HOB elevated with Other (comments) " (suprapubic catheter) upon PTA entry to room.     General Precautions: Standard, fall  Orthopedic Precautions: N/A  Braces: N/A  Respiratory Status: Nasal cannula, flow 3 L/min     Functional Mobility:  Bed Mobility:     Scooting: anteriorly to EOB stand by assistance  Supine to Sit: minimum assistance for BLEs  Sit to Supine: contact guard assistance for BLEs  Transfers:     Sit to Stand:  from EOB contact guard assistance with rolling walker. Verbal cues required for hand placement and safety/to push up from EOB 2/2 pt pulling up from front of walker.   Gait: Pt ambulates x2 trials 34  feet CGA-SBA with RW. Brief standing rest break required between trials.  Pt ambulates with significant forward flexed posture outside ESTIVEN of RW. Verbal cues required to keep RW within close proximity.       AM-PAC 6 CLICK MOBILITY  Turning over in bed (including adjusting bedclothes, sheets and blankets)?: 3  Sitting down on and standing up from a chair with arms (e.g., wheelchair, bedside commode, etc.): 3  Moving from lying on back to sitting on the side of the bed?: 3  Moving to and from a bed to a chair (including a wheelchair)?: 3  Need to walk in hospital room?: 3  Climbing 3-5 steps with a railing?: 1  Basic Mobility Total Score: 16       Treatment & Education:  Patient provided with daily orientation and goals of this PT session.     Pt educated to call for assistance and to transfer with hospital staff only.  Also, pt was educated on the effects of prolonged immobility and the importance of performing OOB activity and exercises to promote healing and reduce recovery time.    Patient left HOB elevated with all lines intact, call button in reach, and RN notified.    GOALS:   Multidisciplinary Problems       Physical Therapy Goals          Problem: Physical Therapy    Goal Priority Disciplines Outcome Goal Variances Interventions   Physical Therapy Goal     PT, PT/OT Progressing     Description: Goals to be met by: 6/9/2024      Patient will increase functional independence with mobility by performin. Supine to sit with Set-up [place]  2. Sit to supine with Set-up [place]  3. Sit to stand transfer with Supervision  4. Bed to chair transfer with Supervision using Rolling Walker  5. Gait  x 100 feet with Supervision using Rolling Walker.   6. Lower extremity exercise program x15 reps per handout, with supervision                         Time Tracking:     PT Received On: 24  PT Start Time: 838     PT Stop Time: 905  PT Total Time (min): 27 min     Billable Minutes: Gait Training 15 and Therapeutic Activity 12    Treatment Type: Treatment  PT/PTA: PTA     Number of PTA visits since last PT visit: 2024

## 2024-05-28 NOTE — NURSING
Pt with no bm since 5/21, getting stool softeners, offered miralax refused, offered supp pt still refusing, c/o nausea, prn zofran given.  Pt with urge for bm, assisted to bsc with no bm results, states will have one when she goes home.  No other needs currently. Call bell within reach.

## 2024-05-28 NOTE — PLAN OF CARE
1830 , dressing changed done used non adhesion dressing , calf and lower legs  less red and swelling   decrease.   Problem: Adult Inpatient Plan of Care  Goal: Plan of Care Review  5/28/2024 0720 by Juan Kidd RN  Outcome: Progressing  5/28/2024 0720 by Juan Kidd RN  Outcome: Progressing  Goal: Patient-Specific Goal (Individualized)  5/28/2024 0720 by Juan Kidd RN  Outcome: Progressing  5/28/2024 0720 by Juan Kidd RN  Outcome: Progressing  Goal: Absence of Hospital-Acquired Illness or Injury  5/28/2024 0720 by Juan Kidd RN  Outcome: Progressing  5/28/2024 0720 by Juan Kidd RN  Outcome: Progressing  Goal: Optimal Comfort and Wellbeing  5/28/2024 0720 by Juan Kidd RN  Outcome: Progressing  5/28/2024 0720 by Juan Kidd RN  Outcome: Progressing  Goal: Readiness for Transition of Care  5/28/2024 0720 by Juan Kidd RN  Outcome: Progressing  5/28/2024 0720 by Juan Kidd RN  Outcome: Progressing     Problem: Infection  Goal: Absence of Infection Signs and Symptoms  5/28/2024 0720 by Juan Kidd RN  Outcome: Progressing  5/28/2024 0720 by Juan Kidd RN  Outcome: Progressing

## 2024-05-28 NOTE — PT/OT/SLP PROGRESS
Occupational Therapy   Treatment    Name: Tasha Hawley  MRN: [id number]  Admitting Diagnosis:  Bilateral cellulitis of lower leg       Recommendations:     Discharge Recommendations: Low Intensity Therapy  Discharge Equipment Recommendations:  none  Barriers to discharge:  None    Assessment:     Tasha Hawley is a 68 y.o. female with a medical diagnosis of Bilateral cellulitis of lower leg.  She presents agreeable to therapy session with encouragement. Patient with very kyphotic/forward flexion posturing in standing ADL activities. Performance deficits affecting function are weakness, impaired endurance, impaired self care skills, impaired functional mobility, gait instability, impaired balance, pain, decreased safety awareness, decreased lower extremity function, impaired skin, impaired cardiopulmonary response to activity.     Rehab Prognosis:  Good; patient would benefit from acute skilled OT services to address these deficits and reach maximum level of function.       Plan:     Patient to be seen 3 x/week to address the above listed problems via self-care/home management, therapeutic activities, therapeutic exercises, neuromuscular re-education  Plan of Care Expires: 06/25/24  Plan of Care Reviewed with: patient, family    Subjective     Chief Complaint: B leg pain; patient very distressed about needing catheter emptied (RN notified multiple times)  Patient/Family Comments/goals: patient's family encouraging to get better  Pain/Comfort:  Pain Rating 1: 8/10  Location - Side 1: Bilateral  Location - Orientation 1: generalized  Location 1: leg  Pain Addressed 1: Reposition, Distraction, Pre-medicate for activity  Pain Rating Post-Intervention 1: 8/10    Objective:     Communicated with: nursing prior to session.  Patient found HOB elevated with BLE elevated with  (suprapubic catheter) upon OT entry to room. Patient's family present in room during session.       General Precautions: Standard, fall     Orthopedic Precautions:N/A  Braces: N/A  Respiratory Status: Nasal cannula, flow 3 L/min     Occupational Performance:     Bed Mobility:    Patient completed Supine to Sit with stand by assistance  Patient completed Sit to Supine with stand by assistance     Functional Mobility/Transfers: Gait belt utilized during session for patient safety  Patient completed Sit <> Stand Transfer with contact guard assistance  with  rolling walker   Functional Mobility: patient ambulated to/from bathroom ( ~20 ft total) with CGA and RW; hunched posture throughout     Activities of Daily Living:  Grooming: contact guard assistance to wash face and brush teeth in stance; patient leaning on sink and unable to achieve upright posturing with tactile and verbal cueing      [facility] 6 Click ADL: 21    Treatment & Education:  Patient educated on:   -purpose of OT and OT POC  -facilitation and education on proper body mechanics, energy conservation, and safety  -importance of early mobility and out of bed activities with staff assist  -overall benefits of therapy     All questions answered within OT scope and to patient's satisfaction    Patient left HOB elevated with all lines intact, call button in reach, and legs elevated; family present    GOALS:   Multidisciplinary Problems       Occupational Therapy Goals          Problem: Occupational Therapy    Goal Priority Disciplines Outcome Interventions   Occupational Therapy Goal     OT, PT/OT Progressing    Description: Goals to be met by: 6/2/24    Patient will increase functional independence with ADLs by performing:    Grooming while standing at sink with Supervision.  Toileting from toilet with Supervision for hygiene and clothing management.   Supine to sit with Modified [place].  Toilet transfer to toilet with Supervision.                         Time Tracking:     OT Date of Treatment: 05/28/24  OT Start Time: 1344  OT Stop Time: 1415  OT Total Time (min): 31 min    Billable  Minutes:Self Care/Home Management 16  Therapeutic Activity 15    OT/SAMANTHA: OT          5/28/2024

## 2024-05-28 NOTE — PLAN OF CARE
Problem: Adult Inpatient Plan of Care  Goal: Plan of Care Review  Outcome: Progressing     Problem: Infection  Goal: Absence of Infection Signs and Symptoms  Outcome: Progressing    Problem: Wound  Goal: Optimal Coping  Outcome: Progressing    Problem: Wound  Goal: Optimal Functional Ability  Outcome: Progressing     Problem: Fall Injury Risk  Goal: Absence of Fall and Fall-Related Injury  Outcome: Progressing

## 2024-05-29 ENCOUNTER — PATIENT OUTREACH (OUTPATIENT)
Dept: EMERGENCY MEDICINE | Facility: HOSPITAL | Age: 69
End: 2024-05-29
Payer: MEDICARE

## 2024-05-29 ENCOUNTER — SSC ENCOUNTER (OUTPATIENT)
Dept: ADMINISTRATIVE | Facility: OTHER | Age: 69
End: 2024-05-29
Payer: MEDICARE

## 2024-05-29 VITALS
HEART RATE: 77 BPM | TEMPERATURE: 97 F | HEIGHT: 65 IN | SYSTOLIC BLOOD PRESSURE: 116 MMHG | BODY MASS INDEX: 40.22 KG/M2 | DIASTOLIC BLOOD PRESSURE: 56 MMHG | OXYGEN SATURATION: 93 % | WEIGHT: 241.38 LBS | RESPIRATION RATE: 18 BRPM

## 2024-05-29 PROCEDURE — 27000221 HC OXYGEN, UP TO 24 HOURS: Mod: HCNC

## 2024-05-29 PROCEDURE — 97116 GAIT TRAINING THERAPY: CPT | Mod: HCNC,CQ

## 2024-05-29 PROCEDURE — 25000003 PHARM REV CODE 250: Mod: HCNC | Performed by: INTERNAL MEDICINE

## 2024-05-29 PROCEDURE — 94640 AIRWAY INHALATION TREATMENT: CPT | Mod: HCNC

## 2024-05-29 PROCEDURE — 63600175 PHARM REV CODE 636 W HCPCS: Mod: HCNC | Performed by: INTERNAL MEDICINE

## 2024-05-29 PROCEDURE — 99900035 HC TECH TIME PER 15 MIN (STAT): Mod: HCNC

## 2024-05-29 PROCEDURE — 25000003 PHARM REV CODE 250: Mod: HCNC | Performed by: STUDENT IN AN ORGANIZED HEALTH CARE EDUCATION/TRAINING PROGRAM

## 2024-05-29 PROCEDURE — 94761 N-INVAS EAR/PLS OXIMETRY MLT: CPT | Mod: HCNC

## 2024-05-29 PROCEDURE — 25000003 PHARM REV CODE 250: Mod: HCNC

## 2024-05-29 PROCEDURE — 97530 THERAPEUTIC ACTIVITIES: CPT | Mod: HCNC,CQ

## 2024-05-29 RX ORDER — HYDROCODONE BITARTRATE AND ACETAMINOPHEN 10; 325 MG/1; MG/1
1 TABLET ORAL EVERY 6 HOURS PRN
Qty: 20 TABLET | Refills: 0 | Status: SHIPPED | OUTPATIENT
Start: 2024-05-29 | End: 2024-06-03

## 2024-05-29 RX ADMIN — ATORVASTATIN CALCIUM 80 MG: 40 TABLET, FILM COATED ORAL at 08:05

## 2024-05-29 RX ADMIN — HYDROCORTISONE 10 MG: 5 TABLET ORAL at 08:05

## 2024-05-29 RX ADMIN — PANTOPRAZOLE SODIUM 40 MG: 40 TABLET, DELAYED RELEASE ORAL at 09:05

## 2024-05-29 RX ADMIN — LEVOTHYROXINE SODIUM 150 MCG: 150 TABLET ORAL at 05:05

## 2024-05-29 RX ADMIN — FLUDROCORTISONE ACETATE 50 MCG: 0.1 TABLET ORAL at 08:05

## 2024-05-29 RX ADMIN — HYDROCORTISONE 5 MG: 5 TABLET ORAL at 04:05

## 2024-05-29 RX ADMIN — DULOXETINE HYDROCHLORIDE 60 MG: 30 CAPSULE, DELAYED RELEASE ORAL at 08:05

## 2024-05-29 RX ADMIN — SENNOSIDES AND DOCUSATE SODIUM 1 TABLET: 50; 8.6 TABLET ORAL at 08:05

## 2024-05-29 RX ADMIN — TIOTROPIUM BROMIDE INHALATION SPRAY 2 PUFF: 3.12 SPRAY, METERED RESPIRATORY (INHALATION) at 09:05

## 2024-05-29 RX ADMIN — FUROSEMIDE 40 MG: 40 TABLET ORAL at 08:05

## 2024-05-29 RX ADMIN — HYDROCODONE BITARTRATE AND ACETAMINOPHEN 1 TABLET: 5; 325 TABLET ORAL at 08:05

## 2024-05-29 RX ADMIN — DAPTOMYCIN 775 MG: 350 INJECTION, POWDER, LYOPHILIZED, FOR SOLUTION INTRAVENOUS at 01:05

## 2024-05-29 NOTE — PROGRESS NOTES
"[facility]  Wound Care    Patient Name:  Tasha Hawley   MRN:  [id number]  Date: 5/29/2024  Diagnosis: Bilateral cellulitis of lower leg    History:     Past Medical History:   Diagnosis Date    Anxiety     Arthritis     Bilateral lower extremity edema     severe chronic    Carotid artery occlusion     Cataract     CHF (congestive heart failure)     Coronary artery disease     subtotalled LAD with collateral    Depression     Fever blister     Hard of hearing     Hypokalemia 01/09/2023    Hyponatremia 02/04/2022    Hypothyroid     Iron deficiency anemia     Lumbar radiculopathy     with chronic pain    Ocular migraine     Other emphysema 05/22/2023    Renal disorder     Sleep apnea     cpap       Social History     Socioeconomic History    Marital status:    Tobacco Use    Smoking status: Never    Smokeless tobacco: Never   Substance and Sexual Activity    Alcohol use: Never    Drug use: Yes     Types: Marijuana     Comment: "medical marijuana gummies"     Social Determinants of Health     Financial Resource Strain: Low Risk  (3/19/2024)    Overall Financial Resource Strain (CARDIA)     Difficulty of Paying Living Expenses: Not very hard   Food Insecurity: No Food Insecurity (3/19/2024)    Hunger Vital Sign     Worried About Running Out of Food in the Last Year: Never true     Ran Out of Food in the Last Year: Never true   Transportation Needs: No Transportation Needs (3/19/2024)    PRAPARE - Transportation     Lack of Transportation (Medical): No     Lack of Transportation (Non-Medical): No   Physical Activity: Inactive (3/19/2024)    Exercise Vital Sign     Days of Exercise per Week: 0 days     Minutes of Exercise per Session: 0 min   Stress: Stress Concern Present (3/19/2024)    [employer] - Occupational Stress Questionnaire     Feeling of Stress : To some extent   Housing Stability: Low Risk  (3/19/2024)    Housing Stability Vital Sign     Unable to Pay for " Housing in the Last Year: No     Number of Places Lived in the Last Year: 1     Unstable Housing in the Last Year: No       Precautions:     Allergies as of 05/22/2024 - Reviewed 05/22/2024   Allergen Reaction Noted    (d)-limonene flavor  09/05/2012    Benadryl [diphenhydramine hcl] Shortness Of Breath 06/08/2018    Fentanyl Itching, Nausea And Vomiting, and Swelling 09/05/2012    Imitrex [sumatriptan succinate] Shortness Of Breath 01/03/2013    Oxycodone-acetaminophen Shortness Of Breath 04/22/2015    Sulfamethoxazole-trimethoprim Anaphylaxis 09/05/2012    Topiramate Shortness Of Breath 01/03/2013    Vancomycin Anaphylaxis 09/05/2012    Butorphanol tartrate  10/25/2016    Evening primrose (oenothera biennis)  09/25/2022    Latex  11/08/2023    Pregabalin Other (See Comments) 04/03/2023    Propoxyphene n-acetaminophen  09/05/2012    Sumatriptan  11/08/2023    White petrolatum-zinc  01/23/2017    Zinc acetate  11/08/2023    Zinc oxide-white petrolatum  09/05/2012    Latex, natural rubber Itching and Rash 02/19/2013    Phenytoin Rash and Other (See Comments) 10/02/2018       [facility] Assessment Details/Treatment   Patient seen for wound care consultation.   Reviewed chart for this encounter.   See Flow Sheet for findings.    Pt in bed and agreeable to care. Venous ulcerations to the posterior lower legs with red wound beds and controlled bleeding. The periwound and anterior lower legs are intact, pink, dry and warm. The wound beds were cleansed with NS, aquacel ag applied, covered with absorbant pads and loosely secured with cast padding and ace bandages. Intact, fluid-filled blister to the left plant foot. Foam dressing applied. Pt and pt's daughter educated on the wound care and the importance of turning every 2 hours.     RECOMMENDATIONS:  - Bilateral posterior lower legs: bedside nursing to 1) cleanse with NS, 2) apply lotion to the anterior legs, 3) apply aquacel ag to the posterior wound beds, 4) cover with  absorbant pads, and 5) loosely secure with cast padding and ace bandages every 3 days  - Left plantar foot: bedside nursing to cleanse with NS, pat dry and apply a foam dressing every 3 days  - Turning every 2 hours  - Heel protecting boots   - Bedside nursing to maintain pressure injury prevention interventions     05/29/24 1448        Wound 05/29/24 1448 Venous Ulcer Left Calf   Date First Assessed/Time First Assessed: 05/29/24 1448   Present on Original Admission: Yes  Primary Wound Type: Venous Ulcer  Side: Left  Location: Calf   Wound Image    Dressing Appearance Intact;Moist drainage   Drainage Amount Small   Drainage Characteristics/Odor Bleeding controlled   Appearance Red;Moist   Tissue loss description Partial thickness   Periwound Area Intact;Dry;Pink;Warm   Care Cleansed with:;Sterile normal saline   Dressing Applied;Silver;Hydrofiber;Cast padding;Absorptive Pad;Elastic bandage        Wound 05/29/24 1448 Venous Ulcer Right Calf   Date First Assessed/Time First Assessed: 05/29/24 1448   Present on Original Admission: Yes  Primary Wound Type: Venous Ulcer  Side: Right  Location: Calf   Wound Image    Dressing Appearance Intact;Moist drainage   Drainage Amount Small   Drainage Characteristics/Odor Bleeding controlled   Appearance Red;Moist   Tissue loss description Partial thickness   Periwound Area Intact;Pink;Dry;Warm   Care Cleansed with:;Sterile normal saline   Dressing Applied;Hydrofiber;Silver;Absorptive Pad;Cast padding;Elastic bandage        Wound 05/29/24 1448 Blister(s) Left plantar Foot   Date First Assessed/Time First Assessed: 05/29/24 1448   Present on Original Admission: No  Primary Wound Type: Blister(s)  Side: Left  Orientation: plantar  Location: Foot   Wound Image    Dressing Appearance Dry;Intact   Drainage Amount None   Appearance Intact;Dry;Tan;Blistered   Periwound Area Intact;Dry   Care Cleansed with:;Sterile normal saline   Dressing Changed;Foam     Recommendations made to primary  team for above plan via secure chat. Wound care will follow-up as needed.   05/29/2024

## 2024-05-29 NOTE — PLAN OF CARE
Problem: Adult Inpatient Plan of Care  Goal: Plan of Care Review  Outcome: Progressing  Goal: Patient-Specific Goal (Individualized)  Outcome: Progressing  Goal: Absence of Hospital-Acquired Illness or Injury  Outcome: Progressing  Goal: Optimal Comfort and Wellbeing  Outcome: Progressing  Goal: Readiness for Transition of Care  Outcome: Progressing     Problem: Infection  Goal: Absence of Infection Signs and Symptoms  Outcome: Progressing     Problem: Wound  Goal: Optimal Coping  Outcome: Progressing     Problem: Wound  Goal: Optimal Coping  Outcome: Progressing  Goal: Optimal Functional Ability  Outcome: Progressing  Goal: Absence of Infection Signs and Symptoms  Outcome: Progressing  Goal: Improved Oral Intake  Outcome: Progressing  Goal: Optimal Pain Control and Function  Outcome: Progressing  Goal: Skin Health and Integrity  Outcome: Progressing  Goal: Optimal Wound Healing  Outcome: Progressing     Problem: Skin Injury Risk Increased  Goal: Skin Health and Integrity  Outcome: Progressing     Problem: Skin Injury Risk Increased  Goal: Skin Health and Integrity  Outcome: Progressing     Problem: Bariatric Environmental Safety  Goal: Safety Maintained with Care  Outcome: Progressing     Problem: Fall Injury Risk  Goal: Absence of Fall and Fall-Related Injury  Outcome: Progressing

## 2024-05-29 NOTE — PT/OT/SLP PROGRESS
Occupational Therapy      Patient Name:  Tasha Hawley   MRN:  [id number]    Patient not seen today secondary to Other (Comment) (pt politely declined to participate due to 9/10 BLE pain and soreness.  She reported ambulating to the bathroom and out in the hallway earlier today.). Will follow-up as scheduled per OT POC.    5/29/2024

## 2024-05-29 NOTE — PLAN OF CARE
Problem: Adult Inpatient Plan of Care  Goal: Plan of Care Review  5/29/2024 1502 by Juan Kidd RN  Outcome: Met  5/29/2024 1003 by Juan Kidd RN  Outcome: Progressing     Problem: Adult Inpatient Plan of Care  Goal: Patient-Specific Goal (Individualized)  5/29/2024 1502 by Juan Kidd RN  Outcome: Met  5/29/2024 1003 by Juan Kidd RN  Outcome: Progressing     Problem: Adult Inpatient Plan of Care  Goal: Absence of Hospital-Acquired Illness or Injury  5/29/2024 1502 by Juan Kidd RN  Outcome: Met  5/29/2024 1003 by Juan Kidd RN  Outcome: Progressing     Problem: Adult Inpatient Plan of Care  Goal: Readiness for Transition of Care  5/29/2024 1502 by Juan Kidd RN  Outcome: Met  5/29/2024 1003 by Juan Kidd RN  Outcome: Progressing     Problem: Infection  Goal: Absence of Infection Signs and Symptoms  5/29/2024 1502 by Juan Kidd RN  Outcome: Met  5/29/2024 1003 by Juan Kidd RN  Outcome: Progressing   Patient daughter at bedside , no complaints voiced , wound care nurse at bedside , update in care given .  Discharged home via ambulance with oxygen therapy ,dressing to wounds lower leg intact and dry , off loading boots in use

## 2024-05-29 NOTE — PLAN OF CARE
05/29/24 1133   Post-Acute Status   Post-Acute Authorization Other   Other Status See Comments  ([facility])     SW received notification that [facility] has upheld the provider's decision that pt is medically stable for discharge.    GERSON met with pt at bedside to provide her with an update on the [employer] decision. Pt reported that she understands and she is ready to discharge.     GERSON reached out to leadership to follow up on [employer] being able to find an accepting home health agency for pt.    GERSON previously sent a referral to [facility] to assist pt with her wound care at home.    GERSON will continue to follow up for discharge planning needs.    UPDATE    SW arranged stretcher transport to [address] [place] [address] via [facility]. Requested  time is 4:00PM. Requested  time does not guarantee arrival time.      Cher Guo LMSW  [facility]  Ext. [phone]

## 2024-05-29 NOTE — PROGRESS NOTES
Please note, patient's information has been sent to Outpatient Care Management for high risk screening.    Reason for Referral: M referral as member is being d/c'd from [facility] on 5/25/24 w/  ordered but no HH provider able to accept the referral.    [id number]  Tasha Kingo  1955    Thank you,    Pradeep Romero, MSN, RN    Manager - Care Management  [facility] Care Support  Mobile [phone]    CM referral as member is being d/c'd from [facility] on 5/25/24 w/ HH ordered but no HH provider able to accept the referral.    In addition to the below, I tried [facility] - unable to staff area  [facility]  - does not have staff in the area  [facility] - not accepting waiver cases at this time    Per the [facility] escalations e-mail:    This patient expected to discharged home tomorrow, 5/25. I sent 7 referrals that came up in network; however, she does not have any accepting agencies. HH orders are pending final ID recs. Pt's daughter told me that when pt discharged a previous time, they had a difficult time finding an accepting home health agency.    [facility] - Has merged with [facility] and they are unable to accept pt.  [facility] - Reported they are unwilling to accept pt back due to non-compliance    Tasha Hawley - MRN: [id number]  [facility]# [id number]    Please contact [facility] with any questions (ext. [phone]).  Additional attachments were provided, available upon request.  Pt currently admitted per Epic    Thank you,    Alka Ambriz, RN MSN  Manager, Outpatient Care Management  05/29/2024

## 2024-05-29 NOTE — PT/OT/SLP PROGRESS
Physical Therapy Treatment    Patient Name:  Tasha Hawley   MRN:  [id number]    Recommendations:     Discharge Recommendations: Low Intensity Therapy  Discharge Equipment Recommendations: none  Barriers to discharge: None    Assessment:     Tasha Hawley is a 68 y.o. female admitted with a medical diagnosis of Bilateral cellulitis of lower leg.  She presents with the following impairments/functional limitations: weakness, impaired endurance, impaired functional mobility, decreased coordination, impaired cognition, decreased safety awareness, pain.  Pt was agreeable and tolerated session well. Pt completed gait training without needing a rest break today, but continues to need Virgil with bed mobility. Pt is progressing well towards goals and continues to demonstrate the need for low intensity therapy on a scheduled basis exhibited by decreased independence with functional mobility    Rehab Prognosis: Good; patient would benefit from acute skilled PT services to address these deficits and reach maximum level of function.    Recent Surgery: * No surgery found *      Plan:     During this hospitalization, patient to be seen 3 x/week to address the identified rehab impairments via gait training, therapeutic activities, therapeutic exercises and progress toward the following goals:    Plan of Care Expires:  06/25/24    Subjective     Chief Complaint: pain with mobility   Patient/Family Comments/goals: agreeable to ambulate in the hallway  Pain/Comfort:  Pain Rating 1: 8/10  Location 1:  (B feet and R shoulder)  Pain Addressed 1: Reposition, Distraction  Pain Rating Post-Intervention 1: 8/10      Objective:     Communicated with nurse prior to session.  Patient found HOB elevated with oxygen, pressure relief boots (suprapubic catheter) upon PT entry to room.     General Precautions: Standard, fall  Orthopedic Precautions: N/A  Braces: N/A  Respiratory Status: Nasal cannula, flow 3 L/min     Functional  Mobility:  Additional staff present: N/A  Bed Mobility:   Scooting to EOB: contact guard assistance  Supine to Sit: minimum assistance; HOB elevated  Assisted with LE management  Transfers:   Sit <> Stand Transfer: contact guard assistance with rolling walker   Cues to push up from the bed  Gait:  Pt ambulated ~60 ft with  closeSBA  and rolling walker.  no rest break and no overt LOB  All lines remained intact throughout ambulation trial, gait belt utilized. Oxygen towed.   Gait Deviation(s): mostly steady, very slow gait with increasing flexed posture (pt reports chronic flexed posture), decrease heel strike, decrease step length, and tends to lean on left forearm  Verbal/tactile cues for pacing and keeping hands on RW  and posture as tolerated    AM-PAC 6 CLICK MOBILITY  Turning over in bed (including adjusting bedclothes, sheets and blankets)?: 3  Sitting down on and standing up from a chair with arms (e.g., wheelchair, bedside commode, etc.): 3  Moving from lying on back to sitting on the side of the bed?: 3  Moving to and from a bed to a chair (including a wheelchair)?: 3  Need to walk in hospital room?: 3  Climbing 3-5 steps with a railing?: 1  Basic Mobility Total Score: 16       Treatment & Education:  Seated BLE therex x10 reps: ankle pumps  Patient educated on role of therapy, goals of session, and benefits of out of bed mobility.   Instructed on use of call button and importance of calling nursing staff for assistance with mobility   Questions/concerns addressed within PTA scope of practice  Pt verbalized understanding.  Whiteboard Updated      Patient left up in chair with all lines intact, call button in reach, and PCT present and BLE elevated.     GOALS:   Multidisciplinary Problems       Physical Therapy Goals       Not on file              Multidisciplinary Problems (Resolved)          Problem: Physical Therapy    Goal Priority Disciplines Outcome Goal Variances Interventions   Physical Therapy  Goal   (Resolved)     PT, PT/OT Met     Description: Goals to be met by: 2024     Patient will increase functional independence with mobility by performin. Supine to sit with Set-up [place]  2. Sit to supine with Set-up [place]  3. Sit to stand transfer with Supervision  4. Bed to chair transfer with Supervision using Rolling Walker  5. Gait  x 100 feet with Supervision using Rolling Walker.   6. Lower extremity exercise program x15 reps per handout, with supervision                         Time Tracking:     PT Received On: 24  PT Start Time: 947     PT Stop Time: 1013  PT Total Time (min): 26 min     Billable Minutes: Gait Training 16 and Therapeutic Activity 10    Treatment Type: Treatment  PT/PTA: PTA     Number of PTA visits since last PT visit: 2     2024

## 2024-05-29 NOTE — PLAN OF CARE
Problem: Adult Inpatient Plan of Care  Goal: Plan of Care Review  5/29/2024 0606 by Nicolette Conn LPN  Outcome: Progressing  5/29/2024 0606 by Nicolette Conn LPN  Outcome: Progressing     Problem: Infection  Goal: Absence of Infection Signs and Symptoms  5/29/2024 0606 by Nicolette Conn LPN  Outcome: Progressing  5/29/2024 0606 by Nicolette Conn LPN  Outcome: Progressing     Problem: Wound  Goal: Optimal Coping  5/29/2024 0606 by Nicolette Conn LPN  Outcome: Progressing  5/29/2024 0606 by Nicolette Conn LPN  Outcome: Progressing     Problem: Skin Injury Risk Increased  Goal: Skin Health and Integrity  5/29/2024 0606 by Nicolette Conn LPN  Outcome: Progressing  5/29/2024 0606 by Nicolette Conn LPN  Outcome: Progressing     Problem: Bariatric Environmental Safety  Goal: Safety Maintained with Care  Outcome: Progressing     Problem: Fall Injury Risk  Goal: Absence of Fall and Fall-Related Injury  5/29/2024 0606 by Nicolette Conn LPN  Outcome: Progressing  5/29/2024 0606 by Nicolette Conn LPN  Outcome: Progressing

## 2024-05-30 ENCOUNTER — EXTERNAL HOME HEALTH (OUTPATIENT)
Dept: HOME HEALTH SERVICES | Facility: HOSPITAL | Age: 69
End: 2024-05-30
Payer: MEDICARE

## 2024-05-30 ENCOUNTER — PATIENT MESSAGE (OUTPATIENT)
Dept: INTERNAL MEDICINE | Facility: CLINIC | Age: 69
End: 2024-05-30
Payer: MEDICARE

## 2024-05-31 DIAGNOSIS — E27.40 ADRENAL INSUFFICIENCY: Primary | ICD-10-CM

## 2024-05-31 RX ORDER — HYDROCORTISONE 10 MG/1
10 TABLET ORAL DAILY
Qty: 90 TABLET | Refills: 0 | Status: SHIPPED | OUTPATIENT
Start: 2024-05-31 | End: 2024-08-29

## 2024-05-31 RX ORDER — HYDROCORTISONE 5 MG/1
5 TABLET ORAL
Qty: 90 TABLET | Refills: 0 | Status: SHIPPED | OUTPATIENT
Start: 2024-05-31 | End: 2024-08-29

## 2024-06-03 ENCOUNTER — TELEPHONE (OUTPATIENT)
Dept: UROLOGY | Facility: CLINIC | Age: 69
End: 2024-06-03
Payer: MEDICARE

## 2024-06-03 ENCOUNTER — OUTPATIENT CASE MANAGEMENT (OUTPATIENT)
Dept: ADMINISTRATIVE | Facility: OTHER | Age: 69
End: 2024-06-03
Payer: MEDICARE

## 2024-06-03 DIAGNOSIS — N32.81 DETRUSOR OVERACTIVITY: ICD-10-CM

## 2024-06-03 DIAGNOSIS — N31.9 NEUROGENIC BLADDER: Primary | ICD-10-CM

## 2024-06-03 NOTE — TELEPHONE ENCOUNTER
----- Message from Monica Guerin sent at 6/3/2024  2:11 PM CDT -----  Regarding: pt advice  Contact: [phone]  Pt calling in regards to needing to speak to nurse in reference to leaking very bad. Pls call   Yes

## 2024-06-03 NOTE — PLAN OF CARE
Received escalation from assigned SW on 5/29 that patient's daughter, Lisa, had concerns regarding inability to obtain HH due to payor's limited network.     Called patient's daughter this date and spoke with her regarding her concerns. Spoke with her about concerns and limitations due to payor plan. She is very relieved that [facility] has accepted the patient and scheduled SOC 6/7/24.     Patient's daughter appreciative of assigned 's assistance and manager's follow-up.     Genny Russo, MSW, LCSW  Manager - Case Management

## 2024-06-03 NOTE — PROGRESS NOTES
Outpatient Care Management  Initial Patient Assessment    Patient: Tasha Hawley  MRN: [id number]  Date of Service: 06/03/2024  Completed by: Caitlin Ordaz RN  Referral Date: 05/24/2024  Date of Eligibility: 5/31/2024  Program:   High Risk  Status: Ongoing  Effective Dates: 6/3/2024 - present  Responsible Staff: Caitlin Ordaz, RN        Reason for Visit   Patient presents with    Initial Assessment    OPCM Enrollment Call    OPCM Chart Review       Brief Summary:  Tasha Hawley was referred to OPCM 5/24/2024 during IP Hosp 5/22-5/29/2024 for Lymphedema, reported 6 falls the day prior, injuring her right shoulder, right shoulder xray neg for fractures. IP efforts to find a HH to accept pt failed d/t insurance barriers & past HH declining to take pt d/t non compliance issues.  Patient qualifies for program based on risk score 95.3%.   Active problem list, medical, surgical and social history reviewed. Active comorbidities include CHF, Chronic use of opioids, Suprapubic catheter for Neurogenic Bladder, Hemipleglia s/p CVA, COPD on home O2, Adrenal Insuffiency, CKDa,Cellulitis alyson LEs . Areas of need identified by patient include connecting pt with HH services for SN- wound care and PT to help with strengthening exercises/ambulation, safety. Refer to OPCM to explore with pt/daughter, pt care options for when pt returns to own residence, transportation, affordable hearing aides resources.   Next steps: F/u next week to check on the start of [facility] [place] and [facility] care for wound specific care.     Disability Status  Is the patient alert and oriented (person, place, time, and situation)?: Alert and oriented x 4  Hearing Difficulty or Deaf: yes  Hearing Management: -- ([place]--her hearing aides broke--has never replaced.)  Visual Difficulty or Blind: yes  Visual and Hearing Needs Conclusion: -- (To benefit from replacement hearing aides.)  Vision Management: -- (Wears rx bi-focal  glasses)  Difficulty Concentrating, Remembering or Making Decisions: yes  Concentration Management: -- (Supervision and care being provided by daughter has made all of the difference in patient's well being & adherence to low na diet, to 1500 cc fluid/day- 6 oz max of soft drink/day, keeping LEs elevated, adhering to medications.)  Communication Difficulty: no  Communication: difficulty speaking  Eating/Swallowing Difficulty: yes  Eating/Swallowing: swallowing solid food  Eating/Swallowing Management: -- (Pt must chew food thoroughly, eat soft food, take time chewing/eating.)  Walking or Climbing Stairs Difficulty: yes  Walking or Climbing Stairs: ambulation difficulty, requires equipment; transferring difficulty, requires equipment  Mobility Management: -- (Uses rollator.)  Dressing/Bathing Difficulty: yes  Dressing/Bathing: bathing difficulty, dependent; dressing difficulty, dependent  Dressing/Bathing Management: -- (Currently dtr is assisting patient.)  Toileting : Dependent (Suprapubic Catheter/neurogenic bladder.)  Continence : Incontinence - Bowel (Bowel incontinence for security.)  Difficulty Managing Errands Independently: yes  Errands Management: -- (Dtr, Lisa is assisting with errands.)  Equipment Currently Used at Home: shower chair; bedside commode; rollator; oxygen (Tub with shower. Transfer tub bench. CPAP was retreived. Jayda-- did not qualify. Failed 3 x and it was picked up. Is good with just the O2 2L pre NC at night.)  ADL Conclusion Statement: -- (Pt has shown an inability to self-manage her daily ADLs/IADLs. She was not adhering to her meds, not limiting her na intake which caused decline in health- increased edema LEs, difficulty picking up legs to amb, exac of ulcers to LEs.)  Service Animal Required: no  Change in Functional Status Since Onset of Current Illness/Injury: yes        Spiritual Beliefs  Spiritual, Cultural Beliefs, Orthodox Practices, Values that Affect Care: no      Social  "History     Socioeconomic History    Marital status:    Tobacco Use    Smoking status: Never    Smokeless tobacco: Never   Substance and Sexual Activity    Alcohol use: Never    Drug use: Yes     Types: Marijuana     Comment: "medical marijuana gummies"     Social Determinants of Health     Financial Resource Strain: Low Risk  (3/19/2024)    Overall Financial Resource Strain (CARDIA)     Difficulty of Paying Living Expenses: Not very hard   Food Insecurity: No Food Insecurity (3/19/2024)    Hunger Vital Sign     Worried About Running Out of Food in the Last Year: Never true     Ran Out of Food in the Last Year: Never true   Transportation Needs: No Transportation Needs (3/19/2024)    PRAPARE - Transportation     Lack of Transportation (Medical): No     Lack of Transportation (Non-Medical): No   Physical Activity: Inactive (3/19/2024)    Exercise Vital Sign     Days of Exercise per Week: 0 days     Minutes of Exercise per Session: 0 min   Stress: Stress Concern Present (3/19/2024)    [employer] - Occupational Stress Questionnaire     Feeling of Stress : To some extent   Housing Stability: Low Risk  (3/19/2024)    Housing Stability Vital Sign     Unable to Pay for Housing in the Last Year: No     Number of Places Lived in the Last Year: 1     Unstable Housing in the Last Year: No       Roles and Relationships  Primary Source of Support/Comfort: child(uri)  Name of Support/Comfort Primary Source: Lisa Willis Dtr  Primary Roles/Responsibilities: wage earner, 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        Patient Reported Insurance  Verified current insurance plan:: [employer] Medicare Advantage  [employer] benefits discussed:: OTC Prescription Discounts; Well Dine; Transportation; Silver " [place]; Mail Order Pharmacy            6/3/2024    11:08 AM 3/19/2024     9:28 AM 12/28/2023     2:54 PM 5/9/2023     2:46 PM 9/22/2022    12:58 PM 11/5/2020     2:39 PM 3/12/2020     2:03 PM   Depression Patient Health Questionnaire   Over the last two weeks how often have you been bothered by little interest or pleasure in doing things Several days Not at all Not at all Not at all Nearly every day Not at all Not at all   Over the last two weeks how often have you been bothered by feeling down, depressed or hopeless Several days Not at all Not at all Several days Nearly every day Not at all Not at all   PHQ-2 Total Score 2 0 0 1 6 0 0   Over the last two weeks how often have you been bothered by trouble falling or staying asleep, or sleeping too much     Nearly every day     Over the last two weeks how often have you been bothered by feeling tired or having little energy     More than half the days     Over the last two weeks how often have you been bothered by a poor appetite or overeating     Not at all     Over the last two weeks how often have you been bothered by feeling bad about yourself - or that you are a failure or have let yourself or your family down     Not at all     Over the last two weeks how often have you been bothered by trouble concentrating on things, such as reading the newspaper or watching television     Not at all     Over the last two weeks how often have you been bothered by moving or speaking so slowly that other people could have noticed. Or the opposite - being so fidgety or restless that you have been moving around a lot more than usual.     Not at all     Over the last two weeks how often have you been bothered by thoughts that you would be better off dead, or of hurting yourself     Not at all     If you checked off any problems, how difficult have these problems made it for you to do your work, take care of things at home or get along with other people?     Extremely difficult      PHQ-9 Score     11     PHQ-9 Interpretation     Moderate         Learning Assessment       06/03/2024 1447 [facility] (6/3/2024 - Present)   Created by Caitlin Ordaz RN -  (Nurse) Status: Complete                 PRIMARY LEARNER     Primary Learner Name:  Lisa Thompson MM - 06/03/2024 1447    Relationship:  Family MM - 06/03/2024 1447    Does the primary learner have any barriers to learning?:  No Barriers MM - 06/03/2024 1447    What is the preferred language of the primary learner?:  English MM - 06/03/2024 1447    Is an  required?:  No MM - 06/03/2024 1447    How does the primary learner prefer to learn new concepts?:  Listening, Reading MM - 06/03/2024 1447    How often do you need to have someone help you read instructions, pamphlets, or written material from your doctor or pharmacy?:  Never MM - 06/03/2024 1447        CO-LEARNER #1     No question answered        CO-LEARNER #2     No question answered        SPECIAL TOPICS     No question answered        ANSWERED BY:     No question answered        Comments         Edit History       Caitlin Ordaz, RN -  (Nurse)   06/03/2024 1447

## 2024-06-03 NOTE — LETTER
Tasha Hawley  [address] [place] [address]    Dear Tasha Hawley,     Welcome to [facility] Outpatient Care Management Program. We are here to assist patients with multiple long-term (chronic) conditions who often need more personalized healthcare.    It was a pleasure talking with you today. My name is Caitlin Ordaz RN. I look forward to working with you as your Care Manager. I will be contacting you by telephone routinely to help coordinate care and resolve issues.    My goal is to help you function at the healthiest and highest level possible. You can contact me directly at [phone].    As an [facility] patient with [employer], some of the services we provide, at no cost to you, include:     Development of an individualized care plan with a Registered Nurse   Connection with a   Assistance from a Community Health Worker  Connection with available resources and services    Coordinate communication among your care team members   Provide coaching and education  Help you understand your doctor's treatment plan  Help you obtain information about your insurance coverage.    All services provided by [facility]s Outpatient Care Managers and other care team members are coordinated with and communicated to your primary care team.      As part of your enrollment, you will be receiving education materials and more information about these services in your [facility] account, by phone, or through the mail. If you do not wish to participate or receive information, you can Opt Out by contacting our office at [phone].      Sincerely,        Caitlin Ordaz RN  [facility]   Outpatient Care Management             [facility]:  Caitlin Ordaz RN, [facility] Nurse   Tel:[phone] Mon-Thurs 8 am- 4:30 pm    Tatiaan Guzman LCSW, [facility] ,   TEL: [phone],   Mon-Fri, 8 am- 4:30 pm    [facility] On Call, 24/7 Nurse  Help Line (non emergent)- TEL:  [phone]    [facility] Technical Issue Support Team--TEL:  [phone], Mon-Fri 9 am- 5 pm.    [email or web address] online patient portal    [facility] Assistance TEL:  [phone]    Digital Medicine Program- TEL:  [phone]

## 2024-06-04 RX ORDER — DARIFENACIN 7.5 MG/1
7.5 TABLET, EXTENDED RELEASE ORAL DAILY
Qty: 30 TABLET | Refills: 11 | Status: SHIPPED | OUTPATIENT
Start: 2024-06-04

## 2024-06-04 NOTE — TELEPHONE ENCOUNTER
Darifenacin 7.5 mg was sent to [facility] pharmacy.  Message forwarded to Shireen to reschedule.

## 2024-06-04 NOTE — TELEPHONE ENCOUNTER
----- Message from Caitlin Ordaz RN sent at 6/4/2024 11:36 AM CDT -----  Regarding: Suprapubic Catheter reported leaking  Dr. Mayo/Staff:    I understand Ms. Hawley contacted the [facility] clinic to report her suprapubic catheter is leaking a great deal.     [facility] is scheduled to see patient this Friday 6/7 for first time s/p recent hosp stay,unrelated to catheter.    If there are any particular orders in the care of the suprapubic catheter, please contact the -  TEL:  [phone].     And as able, please advise pt/daughter, Lisa on how best to proceed with cath leaking. I don't know when cath was last changed. Tasha confirms cath tubing is not kinked and bag is below bladder level. Instructed pt on protecting skin surfaces affected with gauze dsgs or towel, skin barrier cream away from insertion site proper and no powders.       Thank you,  ADOLPH Pruett, RN, CCM [facility]  [email or web address]  TEL:  [phone]

## 2024-06-05 ENCOUNTER — TELEPHONE (OUTPATIENT)
Dept: PREADMISSION TESTING | Facility: HOSPITAL | Age: 69
End: 2024-06-05
Payer: MEDICARE

## 2024-06-05 ENCOUNTER — PATIENT MESSAGE (OUTPATIENT)
Dept: UROLOGY | Facility: CLINIC | Age: 69
End: 2024-06-05
Payer: MEDICARE

## 2024-06-05 ENCOUNTER — TELEPHONE (OUTPATIENT)
Dept: UROLOGY | Facility: CLINIC | Age: 69
End: 2024-06-05
Payer: MEDICARE

## 2024-06-05 ENCOUNTER — OUTPATIENT CASE MANAGEMENT (OUTPATIENT)
Dept: ADMINISTRATIVE | Facility: OTHER | Age: 69
End: 2024-06-05
Payer: MEDICARE

## 2024-06-05 DIAGNOSIS — N31.9 NEUROGENIC BLADDER: ICD-10-CM

## 2024-06-05 DIAGNOSIS — N32.81 DETRUSOR OVERACTIVITY: Primary | ICD-10-CM

## 2024-06-05 NOTE — TELEPHONE ENCOUNTER
----- Message from Shireen Mayo MD sent at 6/5/2024  3:22 PM CDT -----  Regarding: FW: Request for  orders to change Suprapubic catheter on Fri 6/7/2024. Pt says the cath is leaking too.  Please give an order to  to change the suprapubic tube to a 20 Fr size, and send the urine for a culture from the new tube.  Thanks.  ----- Message -----  From: Shireen Lazo MA  Sent: 6/5/2024   3:20 PM CDT  To: Shireen Mayo MD; Christi Wang LPN  Subject: FW: Request for  orders to change Suprapub#    Please advise  ----- Message -----  From: Caitlin Ordaz RN  Sent: 6/5/2024   3:16 PM CDT  To: Shireen Lazo MA  Subject: Request for  orders to change Suprapubic c#    Shireen Lazo MA- [facility]:    I am following up with a request to have orders given to [facility] [place] to change Suprapubic Catheter Their initial home visit is this Fri 6/7/2024.   Please include the catheter size.     [facility] contact is TEL:  [phone].    Please advise daughter or this RN CM.  (I do see Ms. Hawley's daughter Lisa sent a similar message today)    Thank you,    ADOLPH Pruett, RN, CCM [facility]  [email or web address]  TEL:  [phone]

## 2024-06-05 NOTE — DISCHARGE SUMMARY
"[facility] - Observation [address] [place] [address] Medicine  Discharge Summary      Patient Name: Tasha Hawley  MRN: [id number]  LUH: [id number]  Patient Class: IP- Inpatient  Admission Date: 5/22/2024  Hospital Length of Stay: 6 days  Discharge Date and Time: 5/29/2024  4:50 PM  Attending Physician: Winifred att. providers found   Discharging Provider: Marjan Cervantes PA-C  Primary Care Provider: Mesfin Hodges II, MD  [facility] Medicine Team: [facility] Marjan Cervantes PA-C  Primary Care Team: [facility]    HPI:   Tasha Hawley is a 68 y.o. female with a history of COPD (on 4L), HFpEF, chronic BLE lymphedema,adrenal insufficiency, diastolic HF, indwelling suprapubic catheter presents to the ED after having 6 falls yesterday. Patient is a poor historian and lethargic on exam. Awakens to verbal stimuli. Oriented to person and place, but unable to provide any history on my interview. She had received IV morphine a couple hours prior to this. Per ED provider: "but reports falling onto her R shoulder multiple times yesterday a may have hit her head. Pt reports resultant R shoulder pain with movement of RUE. Additionally, pt reports having worsening BLE pain/swelling for the last few days despite compliance with Lasix 80mg TID. However, she drinks 10-12 cans of 7up daily and does not follow any low sodium diet. Discussed with pt daughter about her condition, pt was discharged from SNF recently and has since deteriorated and has not been taking care of herself. Pt daughter unsure of her mother's medication compliance. Additionally, there have been issues securing home health services due to insurance issues."    Called daughter Lisa, who reports that sometimes her mom becomes very lethargic like this. States that she is unsure if her mom is taking medication appropriately. Patient lives in a  home with her  who is in his 80s and is unable to help care for her. Lisa states that her mom was doing very well after " SNF last month, but she thinks when HH came by that it took her mom too long to get to the door so they left. She has significantly declined over the last month. Lisa is considering NH placement and would like to discuss further with SW/ARTURO.     ED: VSS, satting well on home 3L. CBC with chronic stable anemia. CMP unremarkable. BNP 27, troponin <0.006. R shoulder xray negative for acute fracture or dislocation. EKG in NSR. CT head pending.     * No surgery found *      Hospital Course:   Tasha Hawley was placed in  observation for acute encephalopathy. Suspected due to polypharmacy, however infectious encephalopathy possible. Patient found to have significant BLE swelling with skin breakdown and erythema concerning for cellulitis. Started linezolid, ID consulted and recommending switch to IV dapto - slow but steady improvement in swelling and erythema. Wound care recommending both legs to be left open to air vs non-stick dressings. Fluid removal with IV diuresis, but stopped due to hypotension. Will continue home dose of PO lasix. Suspect uncontrolled hypothyroidism due to poor med compliance, resuming home dose of synthroid 150. CM assisting with dc planning - likely needing shelter NH placement from home. Will attempt HH services in the interim. Patient determined to be medically ready for discharge with plan for transition to oral antibiotics on 5/27. Patient appealing her discharge - verdict pending. Will continue IV dapto for now per ID latest recommendations. Appeal denied and patient determined med ready for dc home with family. Continue Doxycycline x10 days. Follow up with PCP and endocrinology. On exam prior to discharge patient doing well and medically stable. Discharge plan discussed and patient/ daughter expressed understanding. All questions answered.        Goals of Care Treatment Preferences:  Code Status: Full Code    Health care agent: Dangelo Hawley (spouse)  Health care agent number:  [phone]    Living Will: Yes     What is most important right now is to focus on curative/life-prolongation (regardless of treatment burdens).  Accordingly, we have decided that the best plan to meet the patient's goals includes continuing with treatment.      Consults:   Consults (From admission, onward)          Status Ordering Provider     Inpatient consult to Midline team  Once        Provider:  (Not yet assigned)    Completed ALBERTO MOLINA     Inpatient consult to Midline team  Once        Provider:  (Not yet assigned)    Completed ALBERTO MOLINA     Inpatient consult to Infectious Diseases  Once        Provider:  (Not yet assigned)    Completed SHASHA TORRES     Inpatient consult to Endocrinology  Once        Provider:  (Not yet assigned)    Completed BRENNA CROSS            No new Assessment & Plan notes have been filed under this hospital service since the last note was generated.  Service: Hospital Medicine    Final Active Diagnoses:    Diagnosis Date Noted POA    PRINCIPAL PROBLEM:  Bilateral cellulitis of lower leg [L03.116, L03.115] 05/23/2024 Yes    COPD (chronic obstructive pulmonary disease) [J44.9] 05/22/2024 Yes    Insomnia [G47.00] 05/22/2024 Yes    Encephalopathy, metabolic [G93.41] 05/22/2024 Yes    Hemiplegia and hemiparesis following cerebral infarction affecting left non-dominant side [I69.354] 03/19/2024 Not Applicable    Adrenal insufficiency [E27.40] 01/05/2024 Yes    Chronic venous hypertension (idiopathic) with ulcer and inflammation of bilateral lower extremity [I87.333] 09/22/2023 Yes    Physical deconditioning [R53.81] 01/13/2021 Yes    Pure hypercholesterolemia [E78.00] 01/13/2020 Yes    Chronic diastolic heart failure [I50.32] 01/13/2020 Yes    Suprapubic catheter [Z93.59] 02/01/2018 Not Applicable     Chronic    Lymphedema of both lower extremities [I89.0] 03/02/2017 Yes    Hypothyroidism (acquired) [E03.9] 02/02/2017 Yes    Frequent falls [R29.6] 02/01/2017 Not Applicable      Chronic    Neurogenic bladder [N31.9] 09/27/2016 Yes     Chronic    Severe obesity (BMI 35.0-39.9) with comorbidity [E66.01] 11/14/2014 Yes    Generalized anxiety disorder [F41.1]  Yes    Sleep apnea [G47.30]  Yes    Iron deficiency anemia [D50.9]  Yes      Problems Resolved During this Admission:       Discharged Condition: stable    Disposition: Home or Self Care    Follow Up:    Patient Instructions:      Ambulatory referral/consult to Outpatient Case Management   Referral Priority: Routine Referral Type: Consultation   Referral Reason: Specialty Services Required   Number of Visits Requested: 1     Ambulatory referral/consult to Endocrinology   Standing Status: Future   Referral Priority: Urgent Referral Type: Consultation   Requested Specialty: Endocrinology   Number of Visits Requested: 1     Ambulatory referral/consult to Internal Medicine   Standing Status: Future   Referral Priority: Urgent Referral Type: Consultation   Referral Reason: Specialty Services Required   Referred to Provider: KATHERIN MOTA II Requested Specialty: Internal Medicine   Number of Visits Requested: 1     Diet Cardiac     Notify your health care provider if you experience any of the following:  temperature >100.4     Notify your health care provider if you experience any of the following:  persistent nausea and vomiting or diarrhea     Notify your health care provider if you experience any of the following:  severe uncontrolled pain     Notify your health care provider if you experience any of the following:  redness, tenderness, or signs of infection (pain, swelling, redness, odor or green/yellow discharge around incision site)     Notify your health care provider if you experience any of the following:  difficulty breathing or increased cough     Notify your health care provider if you experience any of the following:  severe persistent headache     Notify your health care provider if you experience any of the following:   worsening rash     Notify your health care provider if you experience any of the following:  persistent dizziness, light-headedness, or visual disturbances     Notify your health care provider if you experience any of the following:  increased confusion or weakness     Change dressing (specify)   Order Comments: Dressing change: Daily and as needed using non stick dressing to open skin, covered by light gauze wrap.     Activity as tolerated       Significant Diagnostic Studies: N/A    Pending Diagnostic Studies:       None           Medications:  Reconciled Home Medications:      Medication List        START taking these medications      acetaminophen 500 MG tablet  Commonly known as: TYLENOL  Take 2 tablets (1,000 mg total) by mouth every 12 (twelve) hours as needed for Pain.     doxycycline 100 MG Cap  Commonly known as: VIBRAMYCIN  Take 1 capsule (100 mg total) by mouth every 12 (twelve) hours. for 10 days     methocarbamoL 500 MG Tab  Commonly known as: ROBAXIN  Take 1 tablet (500 mg total) by mouth 4 (four) times daily as needed (pain).            CHANGE how you take these medications      QUEtiapine 50 MG tablet  Commonly known as: SEROQUEL  Take 1 tablet (50 mg total) by mouth every evening.  What changed:   medication strength  how much to take  how to take this  when to take this  additional instructions     SPIRIVA RESPIMAT 2.5 mcg/actuation inhaler  Generic drug: tiotropium bromide  Inhale 2 puffs into the lungs once daily. Controller. Please restart taking.  What changed: additional instructions            CONTINUE taking these medications      amitriptyline 25 MG tablet  Commonly known as: ELAVIL  Take 1 tablet (25 mg total) by mouth every evening.     aspirin 81 MG EC tablet  Commonly known as: ECOTRIN  Take 1 tablet (81 mg total) by mouth once daily.     atorvastatin 80 MG tablet  Commonly known as: LIPITOR  Take 1 tablet (80 mg total) by mouth once daily.     butalbital-acetaminophen-caffeine  -40 mg -40 mg per tablet  Commonly known as: FIORICET, ESGIC  Take 1 tablet by mouth daily as needed for Headaches.     cyanocobalamin (vitamin B-12) 5,000 mcg Subl  Place 1 tablet under the tongue once daily.     docusate sodium 100 MG capsule  Commonly known as: COLACE  Take 200 mg by mouth every evening.     DULoxetine 60 MG capsule  Commonly known as: CYMBALTA  Take 1 capsule (60 mg total) by mouth 2 (two) times daily.     fludrocortisone 0.1 mg Tab  Commonly known as: FLORINEF  Take a half-tablet (50 mcg) by mouth once daily     furosemide 40 MG tablet  Commonly known as: LASIX  Take 1 tablet (40 mg total) by mouth once daily.     INTRATHECAL PAIN PUMP COMPOUND  Hydromorphone (7.5 mg/mL) infusion at 8.59 mg/day (0.3578 mg/hr) out of a total reservoir volume of 40 mL  Pump filled monthly     levothyroxine 150 MCG tablet  Commonly known as: SYNTHROID  Take 1 tablet (150 mcg total) by mouth before breakfast.     nitroGLYCERIN 0.4 MG SL tablet  Commonly known as: NITROSTAT  Place 0.4 mg under the tongue every 5 (five) minutes as needed for Chest pain.     OYSTER SHELL CALCIUM-VIT D3 500 mg-5 mcg (200 unit) per tablet  Generic drug: calcium-vitamin D3  Take 2 tablets by mouth 2 (two) times daily.     pantoprazole 40 MG tablet  Commonly known as: PROTONIX  Take 1 tablet (40 mg total) by mouth once daily.     senna 8.6 mg tablet  Commonly known as: SENNA LAX  Take 2 tablets by mouth 2 (two) times daily.            STOP taking these medications      darifenacin 7.5 MG Tb24  Commonly known as: ENABLEX     HYDROcodone-acetaminophen  mg per tablet  Commonly known as: NORCO     hydrocortisone 10 MG Tab  Commonly known as: CORTEF     hydrOXYzine 50 MG tablet  Commonly known as: ATARAX     potassium chloride 10 MEQ Cpsr  Commonly known as: MICRO-K     traZODone 300 MG tablet  Commonly known as: DESYREL            ASK your doctor about these medications      HYDROcodone-acetaminophen  mg per  tablet  Commonly known as: NORCO  Take 1 tablet by mouth every 6 (six) hours as needed for Pain.  Ask about: Should I take this medication?              Indwelling Lines/Drains at time of discharge:   Lines/Drains/Airways       Drain  Duration                  Suprapubic Catheter 02/12/24 1500  20 Fr. 113 days              Line  Duration                  Subcutaneous Infusion Pump Abdomen (Comment) -- days                    Time spent on the discharge of patient: 45 minutes         CLEMENT BessC  Department of Hospital Medicine  [facility] - Observation 11H

## 2024-06-05 NOTE — PROGRESS NOTES
Outpatient Care Management  Plan of Care Follow Up Visit    Patient: Tasha Hawley  MRN: [id number]  Date of Service: 06/05/2024  Completed by: Caitlin Ordaz RN  Referral Date: 05/24/2024    Reason for Visit   Patient presents with    OPCM RN Follow Up Call    OPCM Chart Review       Brief Summary:   OPCM RN follow-up call completed.   Continue education on Lymphedema, Pain.  Care coordination--- request for [facility] orders to change Suprapubic Catheter on Fri 6/7/2024 pending from Dr. Mayo, [facility]RO.   Next Steps: Patient is in agreement to follow-up call on or around 6/10/2024.

## 2024-06-05 NOTE — TELEPHONE ENCOUNTER
Spoke w/ Jeaneth Barroso LPN w/ [facility]. Gave v/o for monthly suprapubic meadows changes with silicone 20Fr, 30 mL balloon. Urine to be collected from new catheter for initial change on 6/7/2024 and when pt c/o UTI symptoms and sent for culture and sensitivity.

## 2024-06-05 NOTE — TELEPHONE ENCOUNTER
Good morning, I got the Patient scheduled for June 25th. She didn't answer but I left a VM. And I'm about to send a portal message.

## 2024-06-06 ENCOUNTER — OUTPATIENT CASE MANAGEMENT (OUTPATIENT)
Dept: ADMINISTRATIVE | Facility: OTHER | Age: 69
End: 2024-06-06
Payer: MEDICARE

## 2024-06-08 ENCOUNTER — PATIENT MESSAGE (OUTPATIENT)
Dept: UROLOGY | Facility: CLINIC | Age: 69
End: 2024-06-08
Payer: MEDICARE

## 2024-06-10 ENCOUNTER — PATIENT MESSAGE (OUTPATIENT)
Dept: INTERNAL MEDICINE | Facility: CLINIC | Age: 69
End: 2024-06-10
Payer: MEDICARE

## 2024-06-12 ENCOUNTER — TELEPHONE (OUTPATIENT)
Dept: UROLOGY | Facility: CLINIC | Age: 69
End: 2024-06-12
Payer: MEDICARE

## 2024-06-12 ENCOUNTER — OUTPATIENT CASE MANAGEMENT (OUTPATIENT)
Dept: ADMINISTRATIVE | Facility: OTHER | Age: 69
End: 2024-06-12
Payer: MEDICARE

## 2024-06-12 NOTE — TELEPHONE ENCOUNTER
Called Rosemarie, states she removed 28mL from pt's meadows but it is stuck. It would not budge and pt will need to come to clinic for change and ucx.

## 2024-06-12 NOTE — PROGRESS NOTES
Outpatient Care Management  Plan of Care Follow Up Visit    Patient: Tasha Hawley  MRN: [id number]  Date of Service: 06/12/2024  Completed by: Caitlin Ordaz RN  Referral Date: 05/24/2024    Reason for Visit   Patient presents with    OPCM RN Follow Up Call    OPCM Chart Review       Brief Summary:   OPCM RN follow-up call completed.   Continue education on Wounds d/t lymphedema, Pain Management.   Next Steps: Patient is in agreement to follow-up call on or around 6/19/2024.

## 2024-06-12 NOTE — TELEPHONE ENCOUNTER
----- Message from Yesenia Fuentes sent at 6/12/2024  2:50 PM CDT -----  Regarding: Complications with catheter  Contact: Rosemarie @ [phone]  Pt Home Health Nurse Rosemarie is calling to speak with someone in the office about pt's Supra Pubic Catheter being stuck and unable to get changed. Please c/b to advise.. Thanks

## 2024-06-12 NOTE — TELEPHONE ENCOUNTER
----- Message from Prema Payton sent at 6/12/2024  2:56 PM CDT -----  Regarding: [facility]  Contact: [phone]  Feliciano is calling from [facility] to advised Dr Mayo that the pt will be moving to [facility] and its not in their service area. Home Health order can be sent to another facility that is in [place] [facility] [facility]

## 2024-06-13 ENCOUNTER — OUTPATIENT CASE MANAGEMENT (OUTPATIENT)
Dept: ADMINISTRATIVE | Facility: OTHER | Age: 69
End: 2024-06-13
Payer: MEDICARE

## 2024-06-13 ENCOUNTER — TELEPHONE (OUTPATIENT)
Dept: INTERNAL MEDICINE | Facility: CLINIC | Age: 69
End: 2024-06-13
Payer: MEDICARE

## 2024-06-13 NOTE — TELEPHONE ENCOUNTER
----- Message from Elida Dany sent at 6/12/2024  3:42 PM CDT -----  Contact: [phone]  Would like to receive medical advice.    Pt called and stated that she have questions about  receiving Home Health Care Service.    Would they like a call back or a response via [email or web address]:  call    Additional information:  please call to advise

## 2024-06-13 NOTE — PROGRESS NOTES
6/13/2024  Spoke with patient who reports she is currently in the car on her way home to [place] from her daughters house on the [facility]. Patient prefers for SW to call back tomorrow afternoon.

## 2024-06-14 ENCOUNTER — OUTPATIENT CASE MANAGEMENT (OUTPATIENT)
Dept: ADMINISTRATIVE | Facility: OTHER | Age: 69
End: 2024-06-14
Payer: MEDICARE

## 2024-06-14 ENCOUNTER — TELEPHONE (OUTPATIENT)
Dept: INTERNAL MEDICINE | Facility: CLINIC | Age: 69
End: 2024-06-14
Payer: MEDICARE

## 2024-06-14 DIAGNOSIS — J96.12 CHRONIC RESPIRATORY FAILURE WITH HYPOXIA AND HYPERCAPNIA: ICD-10-CM

## 2024-06-14 DIAGNOSIS — J96.11 CHRONIC RESPIRATORY FAILURE WITH HYPOXIA AND HYPERCAPNIA: ICD-10-CM

## 2024-06-14 DIAGNOSIS — I89.0 LYMPHEDEMA OF BOTH LOWER EXTREMITIES: Primary | ICD-10-CM

## 2024-06-14 DIAGNOSIS — R29.6 FREQUENT FALLS: Chronic | ICD-10-CM

## 2024-06-14 NOTE — TELEPHONE ENCOUNTER
----- Message from Celeste Ellis MA sent at 6/14/2024  1:24 PM CDT -----  Regarding: FW: Update on HH services    ----- Message -----  From: Caitlin Ordaz RN  Sent: 6/14/2024   1:15 PM CDT  To: [facility] Staff  Subject: Update on  services                            Dr Hodges/Staff:    Tasha returned home to [place]  6/13/2024 after staying with her daughter on [place] since her hosp d/c 5/29/2024 for lymphedema, freq falls. Pt pushed to return home sooner than perhaps advisable.       Daughter, Lisa Thompson reports how well pt has done in her care.  Swelling to alyson LEs/3 leg ulcers resolved, able to fit into her shoes, hasn't required pain med although c/o shoulder pain from a prior fall. She is taking her meds, waking up to take thyroid med, is following low na diet.   [facility] had pt only briefly. They can not transfer their services to the [place].     To continue with  SN/PT, new orders are needed. Efforts are being made to locate a  Agency that can take pt. Pt's h/o non compliance is known to some agencies that refuse to take her back. ([facility] , [place] ).    It appears Tasha did not attend her PCP appt today.     I will update.       Thank you,  ADOLPH Pruett, RN, CCM [facility]  [email or web address]  TEL:  [phone]

## 2024-06-14 NOTE — PROGRESS NOTES
Outpatient Care Management   - Patient Assessment    Patient: Tasha Hawley  MRN:  [id number]  Date of Service:  6/14/2024  Completed by:  Tatiana Guzman LCSW  Referral Date: 05/24/2024    Reason for Visit   Patient presents with    Other     6/13/2024  Spoke with patient who requests for SW to call back tomorrow afternoon    Social Work Assessment     06/14/2024       Brief Summary:  received a referral from OPCM RN for the following HR SW psychosocial needs address patient care options in the home for when she returns home to [place], transportation options, and affordable hearing aides. Spoke with patient who reports she returned home with her spouse after staying with her daughter for a few weeks. Patient states her spouse helps her with dressing/bathing. Patient reports she ambulates with a rollator and manages her own medications at this time. Patient states she missed her hospital f/u appt today due to not being up to going so early and being tired from traveling home yesterday. Reminded patient that OPCM RN is working on obtaining new HH orders and patient will need to reschedule her appt. Patient reports her spouse will provide transportation to her MD appt on Tuesday 6/18. SW informed patient of her [employer] transportation benefits and offered to provide the ph# over the phone, patient declined and prefers for SW to mail her all resources discussed: [facility], [facility], [employer] for hearing aides, and [employer] transportation benefit information/ph# to schedule rides. Patient denies needing help with hearing aides at this time. Collaborated with OPCM RN, patient had been improving while staying with her daughter, the extent of how much help spouse will be able to provide is unclear as he is elderly and ill at this time. Care plan was created in collaboration with patient/caregiver input.   completed the SDOH questionnaire.

## 2024-06-14 NOTE — TELEPHONE ENCOUNTER
I put in a referral.  I hope you will be able to help her get home health.  I am disappointed that she chose to leave her daughter's home.  Her  can not take care of her

## 2024-06-15 ENCOUNTER — OUTPATIENT CASE MANAGEMENT (OUTPATIENT)
Dept: ADMINISTRATIVE | Facility: OTHER | Age: 69
End: 2024-06-15
Payer: MEDICARE

## 2024-06-15 NOTE — PROGRESS NOTES
Outpatient Care Management  Plan of Care Follow Up Visit    Patient: Tasha Hawley  MRN: [id number]  Date of Service: 06/14/2024  Completed by: Caitlin Ordaz RN  Referral Date: 05/24/2024    Reason for Visit   Patient presents with    OPCM RN Follow Up Call    OPCM Chart Review     OPCM RN follow-up call completed.   Continue education on Lymphedema/Wd--resolved, Pain Management.   Next Steps: Patient is in agreement to follow-up call on or around 6/19/2024.

## 2024-06-17 ENCOUNTER — OUTPATIENT CASE MANAGEMENT (OUTPATIENT)
Dept: ADMINISTRATIVE | Facility: OTHER | Age: 69
End: 2024-06-17
Payer: MEDICARE

## 2024-06-17 ENCOUNTER — TELEPHONE (OUTPATIENT)
Dept: UROLOGY | Facility: CLINIC | Age: 69
End: 2024-06-17
Payer: MEDICARE

## 2024-06-17 NOTE — TELEPHONE ENCOUNTER
----- Message from Prema Payton sent at 6/17/2024  8:31 AM CDT -----  Regarding: tube leaking  Contact: [phone]  Good morning the pt is calling to speak with provider or nurse regarding her tube leaking. Please call and discuss with patient.

## 2024-06-18 ENCOUNTER — OUTPATIENT CASE MANAGEMENT (OUTPATIENT)
Dept: ADMINISTRATIVE | Facility: OTHER | Age: 69
End: 2024-06-18
Payer: MEDICARE

## 2024-06-18 ENCOUNTER — OFFICE VISIT (OUTPATIENT)
Dept: UROLOGY | Facility: CLINIC | Age: 69
End: 2024-06-18
Payer: MEDICARE

## 2024-06-18 ENCOUNTER — TELEPHONE (OUTPATIENT)
Dept: INTERNAL MEDICINE | Facility: CLINIC | Age: 69
End: 2024-06-18
Payer: MEDICARE

## 2024-06-18 VITALS
SYSTOLIC BLOOD PRESSURE: 107 MMHG | DIASTOLIC BLOOD PRESSURE: 71 MMHG | WEIGHT: 207.88 LBS | BODY MASS INDEX: 34.6 KG/M2 | HEART RATE: 64 BPM

## 2024-06-18 DIAGNOSIS — Z01.818 PRE-OP TESTING: Primary | ICD-10-CM

## 2024-06-18 PROCEDURE — 3078F DIAST BP <80 MM HG: CPT | Mod: CPTII,S$GLB,, | Performed by: UROLOGY

## 2024-06-18 PROCEDURE — 87184 SC STD DISK METHOD PER PLATE: CPT | Performed by: UROLOGY

## 2024-06-18 PROCEDURE — 3074F SYST BP LT 130 MM HG: CPT | Mod: CPTII,S$GLB,, | Performed by: UROLOGY

## 2024-06-18 PROCEDURE — 51705 CHANGE OF BLADDER TUBE: CPT | Mod: S$GLB,,, | Performed by: UROLOGY

## 2024-06-18 PROCEDURE — 87088 URINE BACTERIA CULTURE: CPT | Performed by: UROLOGY

## 2024-06-18 PROCEDURE — 99499 UNLISTED E&M SERVICE: CPT | Mod: S$GLB,,, | Performed by: UROLOGY

## 2024-06-18 PROCEDURE — 3288F FALL RISK ASSESSMENT DOCD: CPT | Mod: CPTII,S$GLB,, | Performed by: UROLOGY

## 2024-06-18 PROCEDURE — 1100F PTFALLS ASSESS-DOCD GE2>/YR: CPT | Mod: CPTII,S$GLB,, | Performed by: UROLOGY

## 2024-06-18 PROCEDURE — 87077 CULTURE AEROBIC IDENTIFY: CPT | Performed by: UROLOGY

## 2024-06-18 PROCEDURE — 1159F MED LIST DOCD IN RCRD: CPT | Mod: CPTII,S$GLB,, | Performed by: UROLOGY

## 2024-06-18 PROCEDURE — 1125F AMNT PAIN NOTED PAIN PRSNT: CPT | Mod: CPTII,S$GLB,, | Performed by: UROLOGY

## 2024-06-18 PROCEDURE — 3008F BODY MASS INDEX DOCD: CPT | Mod: CPTII,S$GLB,, | Performed by: UROLOGY

## 2024-06-18 PROCEDURE — 99999 PR PBB SHADOW E&M-EST. PATIENT-LVL III: CPT | Mod: PBBFAC,,, | Performed by: UROLOGY

## 2024-06-18 PROCEDURE — 1111F DSCHRG MED/CURRENT MED MERGE: CPT | Mod: CPTII,S$GLB,, | Performed by: UROLOGY

## 2024-06-18 PROCEDURE — 3044F HG A1C LEVEL LT 7.0%: CPT | Mod: CPTII,S$GLB,, | Performed by: UROLOGY

## 2024-06-18 PROCEDURE — 87086 URINE CULTURE/COLONY COUNT: CPT | Performed by: UROLOGY

## 2024-06-18 PROCEDURE — 87186 SC STD MICRODIL/AGAR DIL: CPT | Performed by: UROLOGY

## 2024-06-18 NOTE — TELEPHONE ENCOUNTER
----- Message from Tatiana Guzman LCSW sent at 6/14/2024  3:01 PM CDT -----  Regarding: Outpatient Care Management  Good afternoon,    I am a  with Outpatient Care Management. Ms. Cordova informed me she missed her appt with Dr. Hodges today and would like a call to reschedule. Are you able to reach out to her to reschedule?    Thanks so much,    Tatiana Guzman LCSW  Outpatient Care Management  [phone]

## 2024-06-18 NOTE — PROGRESS NOTES
CHIEF COMPLAINT:    Mrs. Hawley is a 68 y.o. female presenting for an appointment for catheter exchange in anticipation of her procedure.   PRESENTING ILLNESS:    Tasha Hawley is a 68 y.o. female who presents with a history of neurogenic bladder with incomplete bladder emptying, and also stress incontinence.  She is managed with a suprapubic tube but gets bladder spasms which causes her to leak from below and around the suprapubic tube site.  In addition, if the catheter stops draining she will flood from below.  We have done simultaneous bladder injections of botox to help with the detrusor overactivity and urethral bulking agent to help with the outlet.  After her last treatment she was able to stay dry for several months.  Her last injection of both was on November 14, 2023.      She has been scheduled to have her procedure in the past but she ended up hospitalized for staph infection and sepsis.      She states that the home health nurse came out to change the suprapubic tube last week but she could not do so and instructed the patient to come to the office. There is 30 cc of sterile water in the balloon.  She states that the nurse did withdraw that amount but still could not remove the catheter.      She has cardiomyopathy that is surprisingly well compensated.  She has bilateral lower extremity edema that is managed with physical therapy.    REVIEW OF SYSTEMS:    Review of Systems   Constitutional: Negative.    HENT: Negative.     Eyes: Negative.    Respiratory: Negative.     Cardiovascular:  Positive for leg swelling.   Gastrointestinal: Negative.    Musculoskeletal: Negative.    Skin: Negative.    Neurological: Negative.    Endo/Heme/Allergies: Negative.    Psychiatric/Behavioral: Negative.         PATIENT HISTORY:    Past Medical History:   Diagnosis Date    Anxiety     Arthritis     Bilateral lower extremity edema     severe chronic    Carotid artery occlusion     Cataract     CHF (congestive  heart failure)     Coronary artery disease     subtotalled LAD with collateral    Depression     Fever blister     Hard of hearing     Hypokalemia 01/09/2023    Hyponatremia 02/04/2022    Hypothyroid     Iron deficiency anemia     Lumbar radiculopathy     with chronic pain    Ocular migraine     Other emphysema 05/22/2023    Renal disorder     Sleep apnea     cpap       Past Surgical History:   Procedure Laterality Date    ADENOIDECTOMY      BACK SURGERY      x 12    CARDIAC CATHETERIZATION  2016    subtotalled LAD with right to left collaterals    CATARACT EXTRACTION W/  INTRAOCULAR LENS IMPLANT Left     Dr Coleman     CYSTOSCOPIC LITHOLAPAXY N/A 6/27/2019    Procedure: CYSTOLITHOLAPAXY;  Surgeon: Shireen Mayo MD;  Location: [facility] OR 2ND FLR;  Service: Urology;  Laterality: N/A;    CYSTOSCOPIC LITHOLAPAXY N/A 9/3/2019    Procedure: CYSTOLITHOLAPAXY;  Surgeon: Shireen Mayo MD;  Location: [facility] OR 2ND FLR;  Service: Urology;  Laterality: N/A;    CYSTOSCOPY N/A 7/13/2021    Procedure: CYSTOSCOPY;  Surgeon: Shireen Mayo MD;  [facility];  Service: Urology;  Laterality: N/A;    CYSTOSCOPY  11/16/2021    Procedure: CYSTOSCOPY;  Surgeon: Shireen Mayo MD;  [facility];  Service: Urology;;    CYSTOSCOPY  7/19/2022    Procedure: CYSTOSCOPY;  Surgeon: Shireen Mayo MD;  [facility];  Service: Urology;;    CYSTOSCOPY WITH INJECTION OF PERIURETHRAL BULKING AGENT  7/19/2022    Procedure: CYSTOSCOPY, WITH PERIURETHRAL BULKING AGENT INJECTION-MACROPLASTIQUE;  Surgeon: Shireen Mayo MD;  Location: [facility] OR 1ST FLR;  Service: Urology;;    CYSTOSCOPY WITH INJECTION OF PERIURETHRAL BULKING AGENT N/A 3/28/2023    Procedure: CYSTOSCOPY, WITH PERIURETHRAL BULKING AGENT INJECTION;  Surgeon: Shireen Mayo MD;  Location: [facility] OR 1ST FLR;  Service: Urology;  Laterality: N/A;  Bulkamid    CYSTOSCOPY WITH INJECTION OF PERIURETHRAL BULKING AGENT N/A 11/14/2023    Procedure:  CYSTOSCOPY, WITH PERIURETHRAL BULKING AGENT INJECTION;  Surgeon: Shireen Mayo MD;  Location: [facility] OR 1ST FLR;  Service: Urology;  Laterality: N/A;    CYSTOSCOPY,WITH BOTULINUM TOXIN INJECTION N/A 12/13/2022    Procedure: CYSTOSCOPY,WITH BOTULINUM TOXIN INJECTION;  Surgeon: Shireen Mayo MD;  [facility];  Service: Urology;  Laterality: N/A;  300 U    CYSTOSCOPY,WITH BOTULINUM TOXIN INJECTION N/A 3/28/2023    Procedure: CYSTOSCOPY,WITH BOTULINUM TOXIN INJECTION;  Surgeon: Shireen Mayo MD;  [facility];  Service: Urology;  Laterality: N/A;  45 MIN.    300 UNITS    ESOPHAGOGASTRODUODENOSCOPY N/A 5/23/2018    Procedure: ESOPHAGOGASTRODUODENOSCOPY (EGD);  Surgeon: Prince Vance MD;  Location: [facility] ENDO (4TH FLR);  Service: Endoscopy;  Laterality: N/A;  r/s 'd per Dr. Vance due to family emergency- ER    ESOPHAGOGASTRODUODENOSCOPY N/A 6/23/2023    Procedure: EGD (ESOPHAGOGASTRODUODENOSCOPY);  Surgeon: Juaquin Barry MD;  Location: [facility] (2ND FLR);  Service: Endoscopy;  Laterality: N/A;  Refferal: PRINCE VANCE  Order  tele enc 5/18/23  dx: history of a Nissen fundoplication  Additional Scheduling Information:  On home oxygen 2nd floor for airway protection also with a pain pump elevated BMI close to 40   Prep Gastroparesis   ins    HYSTERECTOMY  1975    endometriosis    INJECTION OF BOTULINUM TOXIN TYPE A  7/13/2021    Procedure: INJECTION, BOTULINUM TOXIN, 200units;  Surgeon: Shireen Mayo MD;  [facility];  Service: Urology;;    INJECTION OF BOTULINUM TOXIN TYPE A  11/16/2021    Procedure: INJECTION, BOTULINUM TOXIN, 200units;  Surgeon: Shireen Mayo MD;  [facility];  Service: Urology;;    INJECTION OF BOTULINUM TOXIN TYPE A  7/19/2022    Procedure: INJECTION, BOTULINUM TOXIN, 300 units ;  Surgeon: Shireen Mayo MD;  Location: [facility] OR 1ST FLR;  Service: Urology;;    INJECTION OF BOTULINUM TOXIN TYPE A N/A 11/14/2023     Procedure: INJECTION, BOTULINUM TOXIN, TYPE A;  Surgeon: Shireen Mayo MD;  Location: [facility] OR 1ST FLR;  Service: Urology;  Laterality: N/A;    INSERTION, SUPRAPUBIC CATHETER N/A 12/13/2022    Procedure: INSERTION, SUPRAPUBIC CATHETER;  Surgeon: Shireen Mayo MD;  Location: [facility] OR 1ST FLR;  Service: Urology;  Laterality: N/A;  exchange    INSERTION, SUPRAPUBIC CATHETER N/A 11/14/2023    Procedure: INSERTION, SUPRAPUBIC CATHETER;  Surgeon: Shireen Mayo MD;  Location: [facility] OR 1ST FLR;  Service: Urology;  Laterality: N/A;  EXCHANGE of s/p tube    pain pump placement      SQ Dilaudid Pump managed by Dr. Hillman, Pain Management    REMOVAL OF BONE SPUR OF FOOT Bilateral 9/16/2022    Procedure: EXCISION ARTHRITIC BONE, BILATERAL FOOT;  Surgeon: Adam Mcguire [facility];  Location: [facility] OR;  Service: Podiatry;  Laterality: Bilateral;    REPLACEMENT OF BACLOFEN PUMP N/A 8/2/2023    Procedure: REPLACEMENT, BACLOFEN PUMP;  Surgeon: Smitha Condon MD;  Location: [facility] OR 2ND FLR;  Service: Neurosurgery;  Laterality: N/A;  Anes: Gen  Blood: Type & Screen  Pos: Left Lat  Intrathecal Pump  Bed: Reg Reversed  Rad: C-arm  Pump: 40 cc, [employer]tronics    REPLACEMENT OF CATHETER N/A 10/31/2019    Procedure: REPLACEMENT, CATHETER-SUPRAPUBIC;  Surgeon: Shireen Mayo MD;  [facility];  Service: Urology;  Laterality: N/A;    SPINAL CORD STIMULATOR REMOVAL      before [place]    SPINE SURGERY  5-13-13    CERVICAL FUSION    TONSILLECTOMY         Family History   Problem Relation Name Age of Onset    Cancer Mother  55        breast    Cancer Father          esophagus,had laryngectomy    Esophageal cancer Father      Parkinsonism Maternal Grandmother      Tremor Maternal Grandmother      Heart disease Maternal Uncle klarissa     Colon cancer Maternal Uncle klarissa         Less than 60    Cirrhosis Paternal Aunt          ETOH    Liver disease Paternal Aunt          ETOH    Liver disease Paternal Uncle           "ETOH    Cirrhosis Paternal Uncle          ETOH     Social History     Socioeconomic History    Marital status:    Tobacco Use    Smoking status: Never    Smokeless tobacco: Never   Substance and Sexual Activity    Alcohol use: Never    Drug use: Yes     Types: Marijuana     Comment: "medical marijuana gummies"     Social Determinants of Health     Financial Resource Strain: Low Risk  (6/14/2024)    Overall Financial Resource Strain (CARDIA)     Difficulty of Paying Living Expenses: Not hard at all   Food Insecurity: No Food Insecurity (6/14/2024)    Hunger Vital Sign     Worried About Running Out of Food in the Last Year: Never true     Ran Out of Food in the Last Year: Never true   Transportation Needs: Unmet Transportation Needs (6/14/2024)    TRANSPORTATION NEEDS     Transportation : Yes, it has kept me from medical appointments or from getting my medications.   Physical Activity: Inactive (6/14/2024)    Exercise Vital Sign     Days of Exercise per Week: 0 days     Minutes of Exercise per Session: 0 min   Stress: No Stress Concern Present (6/14/2024)    [employer] - Occupational Stress Questionnaire     Feeling of Stress : Not at all   Recent Concern: Stress - Stress Concern Present (3/19/2024)    [employer] - Occupational Stress Questionnaire     Feeling of Stress : To some extent   Housing Stability: Low Risk  (6/14/2024)    Housing Stability Vital Sign     Unable to Pay for Housing in the Last Year: No     Homeless in the Last Year: No       Allergies:  (d)-limonene flavor; Benadryl [diphenhydramine hcl]; Fentanyl; Imitrex [sumatriptan succinate]; Oxycodone-acetaminophen; Sulfamethoxazole-trimethoprim; Topiramate; Vancomycin; Butorphanol tartrate; Darvocet a500 [propoxyphene n-acetaminophen]; Evening primrose (oenothera biennis); Latex; Pregabalin; Sumatriptan; White petrolatum-zinc; Zinc acetate; Zinc oxide-white petrolatum; Latex, natural " rubber; and Phenytoin    Medications:  Outpatient Encounter Medications as of 6/18/2024   Medication Sig Dispense Refill    amitriptyline (ELAVIL) 25 MG tablet Take 1 tablet (25 mg total) by mouth every evening. 90 tablet 0    atorvastatin (LIPITOR) 80 MG tablet Take 1 tablet (80 mg total) by mouth once daily. 90 tablet 0    butalbital-acetaminophen-caffeine -40 mg (FIORICET, ESGIC) -40 mg per tablet Take 1 tablet by mouth daily as needed for Headaches.      calcium-vitamin D3 (OS-JACKIE 500 + D3) 500 mg-5 mcg (200 unit) per tablet Take 2 tablets by mouth 2 (two) times daily. 120 tablet 11    cyanocobalamin, vitamin B-12, 5,000 mcg Subl Place 1 tablet under the tongue once daily.      darifenacin (ENABLEX) 7.5 MG Tb24 Take 1 tablet (7.5 mg total) by mouth once daily. 30 tablet 11    docusate sodium (COLACE) 100 MG capsule Take 200 mg by mouth every evening.      DULoxetine (CYMBALTA) 60 MG capsule Take 1 capsule (60 mg total) by mouth 2 (two) times daily. 180 capsule 0    fludrocortisone (FLORINEF) 0.1 mg Tab Take a half-tablet (50 mcg) by mouth once daily 15 tablet 5    furosemide (LASIX) 40 MG tablet Take 1 tablet (40 mg total) by mouth once daily. 90 tablet 1    hydrocortisone (CORTEF) 10 MG Tab Take 1 tablet (10 mg total) by mouth once daily. 90 tablet 0    hydrocortisone (CORTEF) 5 MG Tab Take 1 tablet (5 mg total) by mouth before dinner. 90 tablet 0    intrathecal pain pump compound Hydromorphone (7.5 mg/mL) infusion at 8.59 mg/day (0.3578 mg/hr) out of a total reservoir volume of 40 mL  Pump filled monthly      levothyroxine (SYNTHROID) 150 MCG tablet Take 1 tablet (150 mcg total) by mouth before breakfast. 90 tablet 0    nitroGLYCERIN (NITROSTAT) 0.4 MG SL tablet Place 0.4 mg under the tongue every 5 (five) minutes as needed for Chest pain.      pantoprazole (PROTONIX) 40 MG tablet Take 1 tablet (40 mg total) by mouth once daily. 90 tablet 0    QUEtiapine (SEROQUEL) 50 MG tablet Take 1 tablet (50 mg  total) by mouth every evening. 60 tablet 0    senna (SENNA LAX) 8.6 mg tablet Take 2 tablets by mouth 2 (two) times daily.      tiotropium bromide (SPIRIVA RESPIMAT) 2.5 mcg/actuation inhaler Inhale 2 puffs into the lungs once daily. Controller. Please restart taking. 4 g 1    aspirin (ECOTRIN) 81 MG EC tablet Take 1 tablet (81 mg total) by mouth once daily. (Patient not taking: Reported on 5/22/2024) 90 tablet 3     No facility-administered encounter medications on file as of 6/18/2024.         PHYSICAL EXAMINATION:    The patient generally appears in good health, is appropriately interactive, and is in no apparent distress.    Skin: No lesions.    Mental: Cooperative with normal affect.    Neuro: Grossly intact.    HEENT: Normal. No evidence of lymphadenopathy.    Chest:  normal inspiratory effort.    Abdomen: Soft, non-tender. No masses or organomegaly. Bladder is not palpable. No evidence of flank discomfort. No evidence of inguinal hernia.  The suprapubic tube site is clean.      Extremities: No clubbing, cyanosis, or edema      LABS:    Lab Results   Component Value Date    BUN 10 05/27/2024    CREATININE 0.9 05/27/2024       IMPRESSION:    Neurogenic bladder with urinary retention  Stress incontinence    PLAN:    1. The 20 Fr catheter was removed without difficulty.  There was a lip on the balloon which may have been the issue.  The patient was prepped and an 18 Fr silicone catheter was placed with 200 ml blood tinged urine which cleared readily.  A portion was sent for culture.  Drain sponge placed over the suprpaubic tube.   2.  Consent was signed for cystoscopy, Botox injection of the bladder (plan 300 U) and urethral bulking agent with Bulkamid.  Consent signed.       I spent 40 minutes with the patient of which more than half was spent in direct consultation with the patient in regards to our treatment and plan.

## 2024-06-19 ENCOUNTER — TELEPHONE (OUTPATIENT)
Dept: PREADMISSION TESTING | Facility: HOSPITAL | Age: 69
End: 2024-06-19
Payer: MEDICARE

## 2024-06-20 ENCOUNTER — HOSPITAL ENCOUNTER (OUTPATIENT)
Dept: PULMONOLOGY | Facility: CLINIC | Age: 69
Discharge: HOME | End: 2024-06-20
Payer: MEDICARE

## 2024-06-20 ENCOUNTER — OUTPATIENT CASE MANAGEMENT (OUTPATIENT)
Dept: ADMINISTRATIVE | Facility: OTHER | Age: 69
End: 2024-06-20
Payer: MEDICARE

## 2024-06-20 ENCOUNTER — LAB VISIT (OUTPATIENT)
Dept: LAB | Facility: HOSPITAL | Age: 69
End: 2024-06-20
Attending: HOSPITALIST
Payer: MEDICARE

## 2024-06-20 ENCOUNTER — OFFICE VISIT (OUTPATIENT)
Dept: PULMONOLOGY | Facility: CLINIC | Age: 69
End: 2024-06-20
Payer: MEDICARE

## 2024-06-20 ENCOUNTER — OFFICE VISIT (OUTPATIENT)
Dept: INTERNAL MEDICINE | Facility: CLINIC | Age: 69
End: 2024-06-20
Payer: MEDICARE

## 2024-06-20 ENCOUNTER — PATIENT MESSAGE (OUTPATIENT)
Dept: INTERNAL MEDICINE | Facility: CLINIC | Age: 69
End: 2024-06-20

## 2024-06-20 VITALS
WEIGHT: 207 LBS | OXYGEN SATURATION: 92 % | DIASTOLIC BLOOD PRESSURE: 81 MMHG | HEIGHT: 65 IN | HEART RATE: 87 BPM | TEMPERATURE: 98 F | BODY MASS INDEX: 34.49 KG/M2 | SYSTOLIC BLOOD PRESSURE: 112 MMHG | RESPIRATION RATE: 16 BRPM

## 2024-06-20 VITALS
DIASTOLIC BLOOD PRESSURE: 76 MMHG | WEIGHT: 211.19 LBS | SYSTOLIC BLOOD PRESSURE: 116 MMHG | HEIGHT: 65 IN | HEART RATE: 92 BPM | OXYGEN SATURATION: 96 % | BODY MASS INDEX: 35.18 KG/M2

## 2024-06-20 VITALS — BODY MASS INDEX: 35.18 KG/M2 | HEIGHT: 65 IN | WEIGHT: 211.19 LBS

## 2024-06-20 DIAGNOSIS — D64.9 NORMOCYTIC ANEMIA: ICD-10-CM

## 2024-06-20 DIAGNOSIS — M25.511 ACUTE PAIN OF RIGHT SHOULDER: ICD-10-CM

## 2024-06-20 DIAGNOSIS — E27.40 ADRENAL INSUFFICIENCY: ICD-10-CM

## 2024-06-20 DIAGNOSIS — Z86.19 HISTORY OF STAPH INFECTION: ICD-10-CM

## 2024-06-20 DIAGNOSIS — I25.119 CORONARY ARTERY DISEASE INVOLVING NATIVE CORONARY ARTERY OF NATIVE HEART WITH ANGINA PECTORIS: ICD-10-CM

## 2024-06-20 DIAGNOSIS — E87.1 HYPONATREMIA: ICD-10-CM

## 2024-06-20 DIAGNOSIS — I51.9 PULMONARY HYPERTENSION DUE TO DIASTOLIC SYSTEMIC VENTRICULAR DYSFUNCTION: ICD-10-CM

## 2024-06-20 DIAGNOSIS — R13.19 ESOPHAGEAL DYSPHAGIA: ICD-10-CM

## 2024-06-20 DIAGNOSIS — N28.9 RENAL IMPAIRMENT: ICD-10-CM

## 2024-06-20 DIAGNOSIS — J96.11 CHRONIC HYPOXEMIC RESPIRATORY FAILURE: ICD-10-CM

## 2024-06-20 DIAGNOSIS — I27.29 PULMONARY HYPERTENSION DUE TO DIASTOLIC SYSTEMIC VENTRICULAR DYSFUNCTION: ICD-10-CM

## 2024-06-20 DIAGNOSIS — Z01.818 PREOP EXAMINATION: Primary | ICD-10-CM

## 2024-06-20 DIAGNOSIS — G47.33 OSA (OBSTRUCTIVE SLEEP APNEA): ICD-10-CM

## 2024-06-20 DIAGNOSIS — J96.12 CHRONIC RESPIRATORY FAILURE WITH HYPOXIA AND HYPERCAPNIA: Primary | ICD-10-CM

## 2024-06-20 DIAGNOSIS — E03.9 HYPOTHYROIDISM, UNSPECIFIED TYPE: ICD-10-CM

## 2024-06-20 DIAGNOSIS — R25.1 TREMOR: ICD-10-CM

## 2024-06-20 DIAGNOSIS — G89.4 CHRONIC PAIN SYNDROME: Chronic | ICD-10-CM

## 2024-06-20 DIAGNOSIS — K21.00 GASTROESOPHAGEAL REFLUX DISEASE WITH ESOPHAGITIS WITHOUT HEMORRHAGE: ICD-10-CM

## 2024-06-20 DIAGNOSIS — G95.9 CERVICAL MYELOPATHY: ICD-10-CM

## 2024-06-20 DIAGNOSIS — J44.9 CHRONIC OBSTRUCTIVE PULMONARY DISEASE, UNSPECIFIED COPD TYPE: ICD-10-CM

## 2024-06-20 DIAGNOSIS — R60.9 EDEMA, UNSPECIFIED TYPE: ICD-10-CM

## 2024-06-20 DIAGNOSIS — L03.116 BILATERAL CELLULITIS OF LOWER LEG: ICD-10-CM

## 2024-06-20 DIAGNOSIS — I50.32 CHRONIC DIASTOLIC HEART FAILURE: ICD-10-CM

## 2024-06-20 DIAGNOSIS — L03.115 BILATERAL CELLULITIS OF LOWER LEG: ICD-10-CM

## 2024-06-20 DIAGNOSIS — G43.909 MIGRAINE WITHOUT STATUS MIGRAINOSUS, NOT INTRACTABLE, UNSPECIFIED MIGRAINE TYPE: ICD-10-CM

## 2024-06-20 DIAGNOSIS — G47.30 SLEEP APNEA, UNSPECIFIED TYPE: ICD-10-CM

## 2024-06-20 DIAGNOSIS — Z93.59 SUPRAPUBIC CATHETER: Chronic | ICD-10-CM

## 2024-06-20 DIAGNOSIS — Z01.811 PREOPERATIVE RESPIRATORY EXAMINATION: ICD-10-CM

## 2024-06-20 DIAGNOSIS — R29.6 FREQUENT FALLS: Chronic | ICD-10-CM

## 2024-06-20 DIAGNOSIS — F41.9 ANXIETY: ICD-10-CM

## 2024-06-20 DIAGNOSIS — J96.11 CHRONIC RESPIRATORY FAILURE WITH HYPOXIA AND HYPERCAPNIA: Primary | ICD-10-CM

## 2024-06-20 DIAGNOSIS — R53.81 PHYSICAL DECONDITIONING: ICD-10-CM

## 2024-06-20 DIAGNOSIS — R09.89 PULMONARY AIR TRAPPING: ICD-10-CM

## 2024-06-20 DIAGNOSIS — Z98.890 S/P INSERTION OF INTRATHECAL PUMP: Chronic | ICD-10-CM

## 2024-06-20 DIAGNOSIS — F11.90 CHRONIC, CONTINUOUS USE OF OPIOIDS: ICD-10-CM

## 2024-06-20 DIAGNOSIS — I77.9 BILATERAL CAROTID ARTERY DISEASE, UNSPECIFIED TYPE: ICD-10-CM

## 2024-06-20 PROBLEM — R00.1 BRADYCARDIA: Status: RESOLVED | Noted: 2017-11-14 | Resolved: 2024-06-20

## 2024-06-20 PROBLEM — E87.6 HYPOKALEMIA: Status: RESOLVED | Noted: 2023-01-09 | Resolved: 2024-06-20

## 2024-06-20 PROBLEM — N18.31 STAGE 3A CHRONIC KIDNEY DISEASE: Status: RESOLVED | Noted: 2023-05-22 | Resolved: 2024-06-20

## 2024-06-20 PROBLEM — G93.41 ENCEPHALOPATHY, METABOLIC: Status: RESOLVED | Noted: 2024-05-22 | Resolved: 2024-06-20

## 2024-06-20 PROBLEM — L03.90 CELLULITIS: Status: RESOLVED | Noted: 2021-07-07 | Resolved: 2024-06-20

## 2024-06-20 PROBLEM — I50.22 CHRONIC SYSTOLIC HEART FAILURE: Status: RESOLVED | Noted: 2023-07-18 | Resolved: 2024-06-20

## 2024-06-20 PROBLEM — B37.2 YEAST DERMATITIS: Status: RESOLVED | Noted: 2024-05-14 | Resolved: 2024-06-20

## 2024-06-20 PROBLEM — I69.354 HEMIPLEGIA AND HEMIPARESIS FOLLOWING CEREBRAL INFARCTION AFFECTING LEFT NON-DOMINANT SIDE: Status: RESOLVED | Noted: 2024-03-19 | Resolved: 2024-06-20

## 2024-06-20 LAB
ALBUMIN SERPL BCP-MCNC: 4.1 G/DL (ref 3.5–5.2)
ALP SERPL-CCNC: 156 U/L (ref 55–135)
ALT SERPL W/O P-5'-P-CCNC: 14 U/L (ref 10–44)
ANION GAP SERPL CALC-SCNC: 7 MMOL/L (ref 8–16)
AST SERPL-CCNC: 16 U/L (ref 10–40)
BILIRUB SERPL-MCNC: 0.5 MG/DL (ref 0.1–1)
BUN SERPL-MCNC: 23 MG/DL (ref 8–23)
CALCIUM SERPL-MCNC: 9.9 MG/DL (ref 8.7–10.5)
CHLORIDE SERPL-SCNC: 95 MMOL/L (ref 95–110)
CO2 SERPL-SCNC: 33 MMOL/L (ref 23–29)
CREAT SERPL-MCNC: 1.3 MG/DL (ref 0.5–1.4)
EST. GFR  (NO RACE VARIABLE): 45.1 ML/MIN/1.73 M^2
GLUCOSE SERPL-MCNC: 96 MG/DL (ref 70–110)
POTASSIUM SERPL-SCNC: 5 MMOL/L (ref 3.5–5.1)
PROT SERPL-MCNC: 8.1 G/DL (ref 6–8.4)
SODIUM SERPL-SCNC: 135 MMOL/L (ref 136–145)

## 2024-06-20 PROCEDURE — 1125F AMNT PAIN NOTED PAIN PRSNT: CPT | Mod: CPTII,S$GLB,, | Performed by: HOSPITALIST

## 2024-06-20 PROCEDURE — 3074F SYST BP LT 130 MM HG: CPT | Mod: CPTII,S$GLB,, | Performed by: HOSPITALIST

## 2024-06-20 PROCEDURE — 99999 PR PBB SHADOW E&M-EST. PATIENT-LVL V: CPT | Mod: PBBFAC,,, | Performed by: HOSPITALIST

## 2024-06-20 PROCEDURE — 1101F PT FALLS ASSESS-DOCD LE1/YR: CPT | Mod: CPTII,S$GLB,, | Performed by: HOSPITALIST

## 2024-06-20 PROCEDURE — 3079F DIAST BP 80-89 MM HG: CPT | Mod: CPTII,S$GLB,, | Performed by: HOSPITALIST

## 2024-06-20 PROCEDURE — 99999 PR PBB SHADOW E&M-EST. PATIENT-LVL IV: CPT | Mod: PBBFAC,,, | Performed by: INTERNAL MEDICINE

## 2024-06-20 PROCEDURE — 3288F FALL RISK ASSESSMENT DOCD: CPT | Mod: CPTII,S$GLB,, | Performed by: HOSPITALIST

## 2024-06-20 PROCEDURE — 80053 COMPREHEN METABOLIC PANEL: CPT | Performed by: HOSPITALIST

## 2024-06-20 PROCEDURE — 3008F BODY MASS INDEX DOCD: CPT | Mod: CPTII,S$GLB,, | Performed by: HOSPITALIST

## 2024-06-20 PROCEDURE — 1111F DSCHRG MED/CURRENT MED MERGE: CPT | Mod: CPTII,S$GLB,, | Performed by: HOSPITALIST

## 2024-06-20 PROCEDURE — 1159F MED LIST DOCD IN RCRD: CPT | Mod: CPTII,S$GLB,, | Performed by: HOSPITALIST

## 2024-06-20 PROCEDURE — 3044F HG A1C LEVEL LT 7.0%: CPT | Mod: CPTII,S$GLB,, | Performed by: HOSPITALIST

## 2024-06-20 PROCEDURE — 36415 COLL VENOUS BLD VENIPUNCTURE: CPT | Performed by: HOSPITALIST

## 2024-06-20 PROCEDURE — 99215 OFFICE O/P EST HI 40 MIN: CPT | Mod: S$GLB,,, | Performed by: HOSPITALIST

## 2024-06-20 RX ORDER — MENTHOL 100 MG/G
CREAM TOPICAL
COMMUNITY

## 2024-06-20 RX ORDER — HYDROCODONE BITARTRATE AND ACETAMINOPHEN 10; 325 MG/1; MG/1
1 TABLET ORAL EVERY 6 HOURS PRN
COMMUNITY

## 2024-06-20 RX ORDER — ALBUTEROL SULFATE 90 UG/1
2 AEROSOL, METERED RESPIRATORY (INHALATION) EVERY 6 HOURS PRN
Qty: 8 G | Refills: 5 | Status: SHIPPED | OUTPATIENT
Start: 2024-06-20

## 2024-06-20 RX ORDER — TRAMADOL HYDROCHLORIDE 50 MG/1
50 TABLET ORAL EVERY 12 HOURS PRN
COMMUNITY

## 2024-06-20 NOTE — ASSESSMENT & PLAN NOTE
Please see under chronic pain    She takes THC gummies   I suggested for her to work with the pain management doctor about it

## 2024-06-20 NOTE — ASSESSMENT & PLAN NOTE
I suggested the perioperative care team be aware of the presence of the intrathecal pump and from what I gather ,it usually does not get adjusted for surgery

## 2024-06-20 NOTE — OUTPATIENT SUBJECTIVE & OBJECTIVE
"Outpatient Subjective & Objective     Chief complaint-Preoperative evaluation, Perioperative Medical management, complication reduction plan     Active cardiac conditions- none    Revised cardiac risk index predictors- none    Functional capacity -Examples of physical activity  , walks with walker, -- She can undertake all the above activities without  chest pain,chest tightness,  ,dizziness,lightheadedness making her exercise tolerance less  than 4 Mets.       Review of Systems   Constitutional:  Negative for chills and fever.   HENT:          Sleep apnea     Eyes:         No new visual changes   Respiratory:          No cough , phlegm     No Hemoptysis   Cardiovascular:         As noted   Endocrine:        Prednisone use > 20 mg daily for 3 weeks- none   Genitourinary:  Negative for dysuria and menstrual problem.   Musculoskeletal:         As above   Back pain better   Skin:  Negative for rash.   Neurological:  Negative for syncope.        No unilateral weakness   Hematological:         Current use of Anticoagulants  none   Psychiatric/Behavioral:            No SI/HI   No vascular stenting     Has osteoporosis  She had a complete hysterectomy 28 Y-for heavy cycles            No anesthesia, bleeding, cardiac problems, PONV  with previous surgeries/procedures.  Medications and Allergies reviewed in epic.   FH- No anesthesia,bleeding / venous thrombosis , in family      Physical Exam  Blood pressure 112/81, pulse 87, temperature 97.8 °F (36.6 °C), temperature source Oral, resp. rate 16, height 5' 5" (1.651 m), weight 93.9 kg (207 lb), SpO2 (!) 92%.    I offered a sheet and the presence of a chaperone during physical examination   She was comfortable to proceed with the exam without the the presence of a chaperone       Physical Exam  Constitutional- Vitals - Body mass index is 34.45 kg/m².,   Vitals:    06/20/24 1103   BP: 112/81   Pulse: 87   Resp: 16   Temp: 97.8 °F (36.6 °C)     General " appearance-Conscious,Coherent  Eyes- No conjunctival icterus,pupils  round , reactive to light , and  left sided intra ocular lens   ENT-Oral cavity- moist    , Hearing grossly normal   Neck- No thyromegaly ,Trachea -central, No jugular venous distension,   No Carotid Bruit   Cardiovascular -Heart Sounds- Normal  and  no murmur   , No gallop rhythm   Respiratory - Normal Respiratory Effort, Normal breath sounds,  Crepitationscrepts,  no wheeze , and  no forced expiratory wheeze    Peripheral pitting pedal edema-- mild, no calf pain   Gastrointestinal -Soft abdomen, No palpable masses, Non Tender,Liver,Spleen not palpable. No-- free fluid and shifting dullness  Musculoskeletal- No finger Clubbing. Strength grossly normal   Lymphatic-No Palpable cervical, axillary,Inguinal lymphadenopathy   Psychiatric - normal effect,Orientation    Miscellaneous -  no renal bruit and  examined on the chair      Investigations  Lab and Imaging have been reviewed in epic.    5/22/2024- No evidence of deep venous thrombosis in either lower extremity.     Review of Medicine tests    EKG- I had independently reviewed the EKG from--5/22/2024  It was reported to be showing     Normal sinus rhythm   Intraventricular conduction delay   Abnormal ECG   When compared with ECG of 16-MAY-2024 21:50,   No significant change was found     Review of clinical lab tests:  Lab Results   Component Value Date    CREATININE 0.9 05/27/2024    HGB 10.7 (L) 05/27/2024     05/27/2024         Review of old records- Was done and information gathered regards to events leading to surgery and health conditions of significance in the perioperative period         Review of old records- Was done and information gathered regards to events leading to surgery and health conditions of significance in the perioperative period.    Outpatient Subjective & Objective

## 2024-06-20 NOTE — ASSESSMENT & PLAN NOTE
Presented to the ED after having 6 falls   Acute encephalopathy. Suspected due to polypharmacy, however infectious encephalopathy possible. Patient found to have significant BLE swelling with skin breakdown and erythema concerning for cellulitis    He finished the antibiotic

## 2024-06-20 NOTE — ASSESSMENT & PLAN NOTE
Was hospitalized recently for altered mental status and cellulitis   She just finished a course of antibiotic for cellulitis  Her legs are swollen and as per the daughter they were doing much better up until recently    Her leg swellings have returned  She is taking Lasix once a day  Not using excessively salted food    She has not been elevating her legs   Has been cleaning her house   Not sleeping    Not known to have   Varicose veins   Excessive Salted food   Long standing NSAID use     Not known to have     Heart failure   Liver failure   Kidney failure     Edema- I suggested avoidance of added salt,avoidance of NSAID's, unless advised or ordered  and suggested Limb elevation and marley hose use

## 2024-06-20 NOTE — ASSESSMENT & PLAN NOTE
May 2024      Left Ventricle: The left ventricle is normal in size. Normal wall thickness. There is normal systolic function. Biplane (2D) method of discs ejection fraction is 69%. Grade I diastolic dysfunction.    Right Ventricle: Normal right ventricular cavity size. Systolic function is normal. TAPSE is 3.66 cm.    Normal left atrial size. The left atrium volume index is 28.1 mL/m2.    Normal right atrial size.    The aortic valve is structurally normal. There is normal leaflet mobility.    The mitral valve is structurally normal. There is normal leaflet mobility.    The tricuspid valve is structurally normal. There is normal leaflet mobility.    Diastolic Dysfunction:  I  suggest watching her fluid status perioperatively and to watch out for fluid overload in view of her Diastolic Dysfunction.  I suggest avoidance of the ordering of continuous IV fluids and if IV fluids are required ,to order for a specific duration of time and consider ordering lower IV fluid rate

## 2024-06-20 NOTE — ASSESSMENT & PLAN NOTE
- COPD (on 2L) continuously  She has not needing albuterol much suggesting that she might be doing good from a lung standpoint  On Spiriva    SOB on exertion   She feels short of breath at rest - could be a component of  anxiety  She currently does not have any cough or phlegm  She has wheezing  PFT July 2023      Spirometry suggests restriction. Spirometry shows borderline improvement following bronchodilator. Lung volume determination is normal. Overall baseline spirometry shows severe ventilatory impairment. Airway mechanics are abnormal showing increased  airway resistance and decreased conductance. DLCO is moderately decreased.     No recent flare-up of COPD    Pulmonary optimization measures    I suggest the following to help with the pulmonary status in the perioperative period     Preoperative period     - Scheduled use of inhaler treatment    - Inhaler technique reinforcement    - Increased Physical activity   - Mucolytic / Expectorant treatment  - Mucinex  - Weight  loss that helps the pulmonary status-       Intra / post operative period     - Minimize use of sedating Medication- Opioid/ Benzodiazepine   - Consider use of regional block , to reduce the need for opioid treatment  - Consider opioid sparing anesthesia/Analgesia   - Avoid use of excessive Fio2 as it can reduce the hypoxic respiratory drive    - Consult Respiratory therapy , Physical therapy   - Cough, Deep breathing exercises   - Early ambulation   - Out of bed to chair   - Incentive spirometer use   -  Oral care , head end of bed elevation, Nutrition   - Oxygen saturation, End tidal CO2 monitoring    - Having patient in a monitored care area so that hemodynamics , respiratory status can be monitored     Her lung status seems to be at baseline and she tolerated these procedures in the past

## 2024-06-20 NOTE — ASSESSMENT & PLAN NOTE
Fluctuant   No overt GI/ blood losses  No stomach upset/ ulcer   No Colon cancer in family   No long standing NSAID use- except ASA 81 mg daily  Discussed follow up

## 2024-06-20 NOTE — PROGRESS NOTES
[facility]  Progress Note    Patient Name: Tasha Hawley  MRN: [id number]  Date of Evaluation- 06/20/2024  PCP- Mesfin Hodges II, MD    Future cases for Tasha Hawley [[id number]]       Case ID Status Date Time Nico Procedure Provider Location    [id number] [facility] 6/25/2024  8:15 AM 60 CYSTOSCOPY,WITH BOTULINUM TOXIN INJECTION Shireen Mayo MD [[id number]] [facility] OR 1ST FLR            HPI:  History of present illness- I had the pleasure of meeting this pleasant 68 y.o. lady in the pre op clinic prior to her elective Urological surgery. The patient is known to me . Ms Cordova was accompanied by  Mr Pierson.  Had her daughter Ms. Gonzalez on the phone during the consultation process  I have obtained the information by speaking to her caring daughter who seems very knowledgeable and involved in her care    I have obtained the history by speaking to the patient and by reviewing the electronic health records.    Events leading up to surgery / History of presenting illness -    She has a neurogenic bladder and has a suprapubic tube but she has bladder spasms and has urine leak through the urethra  She has incontinence due to the leakage  She has had Botox injection in the past which helps her  She already had this done a few times in the past and is planning on having it a gain done on June 25 th  Based on what the information that I have obtained, she does not have any urinary tract infection at this time    As per chart     Neurogenic bladder with incomplete bladder emptying, and also stress incontinence.  She is managed with a suprapubic tube but gets bladder spasms       Relevant health conditions of significance for the perioperative period/ History of presenting illness -      Health conditions of significance for the perioperative period     - COPD (on 2L)  - Chronic BLE lymphedema  - Adrenal insufficiency  - 2016 catheterization documents subtotaled left anterior descending artery with  right-to-left collaterals.  She had a nuclear stress test June 2021 showing normal ejection fraction and normal perfusion study  - Chronic Respiratory failure   - BMI 34.45  - Constipation  - Opioid   - Acid reflux  - Hypothyroid   - History of Staph infection   - Migraine  - Depression  - Shoulder pain        Not known to have Prediabetes , Diabetes Mellitus,  Kidney problem, deep vein thrombosis, pulmonary embolism,   fatty liver , vascular stenting     Lives with   Single story house   Pet Dog, fish  Children - 2  Pregnancies - 2  No miscarriages  C sections - 2  Has help post op           Subjective/ Objective:     Chief complaint-Preoperative evaluation, Perioperative Medical management, complication reduction plan     Active cardiac conditions- none    Revised cardiac risk index predictors- none    Functional capacity -Examples of physical activity  , walks with walker, -- She can undertake all the above activities without  chest pain,chest tightness,  ,dizziness,lightheadedness making her exercise tolerance less  than 4 Mets.       Review of Systems   Constitutional:  Negative for chills and fever.   HENT:          Sleep apnea     Eyes:         No new visual changes   Respiratory:          No cough , phlegm     No Hemoptysis   Cardiovascular:         As noted   Endocrine:        Prednisone use > 20 mg daily for 3 weeks- none   Genitourinary:  Negative for dysuria and menstrual problem.   Musculoskeletal:         As above   Back pain better   Skin:  Negative for rash.   Neurological:  Negative for syncope.        No unilateral weakness   Hematological:         Current use of Anticoagulants  none   Psychiatric/Behavioral:            No SI/HI   No vascular stenting     Has osteoporosis  She had a complete hysterectomy 28 Y-for heavy cycles            No anesthesia, bleeding, cardiac problems, PONV  with previous surgeries/procedures.  Medications and Allergies reviewed in epic.   FH- No  "anesthesia,bleeding / venous thrombosis , in family      Physical Exam  Blood pressure 112/81, pulse 87, temperature 97.8 °F (36.6 °C), temperature source Oral, resp. rate 16, height 5' 5" (1.651 m), weight 93.9 kg (207 lb), SpO2 (!) 92%.    I offered a sheet and the presence of a chaperone during physical examination   She was comfortable to proceed with the exam without the the presence of a chaperone       Physical Exam  Constitutional- Vitals - Body mass index is 34.45 kg/m².,   Vitals:    06/20/24 1103   BP: 112/81   Pulse: 87   Resp: 16   Temp: 97.8 °F (36.6 °C)     General appearance-Conscious,Coherent  Eyes- No conjunctival icterus,pupils  round , reactive to light , and  left sided intra ocular lens   ENT-Oral cavity- moist    , Hearing grossly normal   Neck- No thyromegaly ,Trachea -central, No jugular venous distension,   No Carotid Bruit   Cardiovascular -Heart Sounds- Normal  and  no murmur   , No gallop rhythm   Respiratory - Normal Respiratory Effort, Normal breath sounds,  Crepitationscrepts,  no wheeze , and  no forced expiratory wheeze    Peripheral pitting pedal edema-- mild, no calf pain   Gastrointestinal -Soft abdomen, No palpable masses, Non Tender,Liver,Spleen not palpable. No-- free fluid and shifting dullness  Musculoskeletal- No finger Clubbing. Strength grossly normal   Lymphatic-No Palpable cervical, axillary,Inguinal lymphadenopathy   Psychiatric - normal effect,Orientation    Miscellaneous -  no renal bruit and  examined on the chair      Investigations  Lab and Imaging have been reviewed in epic.    5/22/2024- No evidence of deep venous thrombosis in either lower extremity.     Review of Medicine tests    EKG- I had independently reviewed the EKG from--5/22/2024  It was reported to be showing     Normal sinus rhythm   Intraventricular conduction delay   Abnormal ECG   When compared with ECG of 16-MAY-2024 21:50,   No significant change was found     Review of clinical lab tests:  Lab " Results   Component Value Date    CREATININE 0.9 05/27/2024    HGB 10.7 (L) 05/27/2024     05/27/2024         Review of old records- Was done and information gathered regards to events leading to surgery and health conditions of significance in the perioperative period         Review of old records- Was done and information gathered regards to events leading to surgery and health conditions of significance in the perioperative period.        Preoperative cardiac risk assessment-  The patient does not have any active cardiac conditions . Revised cardiac risk index predictors-0 ---.Functional capacity is less than 4 Mets. She will be undergoing a Urological procedure that carries a Moderate Risk risk     Risk of a major Cardiac event ( Defined as death, myocardial infarction, or cardiac arrest at 30 days after noncardiac surgery), based on RCRI score     -3.9%       No further cardiac work up is indicated prior to proceeding with the surgery     Orders Placed This Encounter    Comprehensive Metabolic Panel       American Society of Anesthesiologists Physical status classification ( ASA ) class: 3     Postoperative pulmonary complication risk assessment:      ARISCAT ( [place]) risk index- risk class -  Low, if duration of surgery is under 2 hours, intermediate, if duration of surgery is over 2 hours          Assessment/Plan:     Cervical myelopathy  No dropping things , problems with smaller objects   Noted that the surgery is under monitored care anesthesia   In the event of her requiring intubation, I suggest that the anesthesiology team be aware of this    Chronic pain syndrome  Low back pain  She is under pain management    She has a intrathecal pump  She is on oral pain medication namely    Vicodin as needed-about 2 times a day   Tramadol as needed    Chronic continuous opioid use- In view of the opioid use, the patient may have opioid tolerance . I suggest considering the possibility of opioid tolerance  in  planning post operative pain control     Discussed possibly reducing the opioid use in preparation for surgery , so that it reduces the opioid tolerance which helps post op pain control     Suggested letting pain management know about the up coming surgery      She uses medication to keep her bowels moving due to constipation from opioid pain medication    Her usual bowel regimen is about 2 times a week    Suggested working on bowel movements in preparation for surgery   Constipation- I suggest giving laxative regimen as opioid use,reduced ambulation  can increase the constipation        Migraine  She is doing good from a migraine standpoint and he is taking Fioricet as needed   I suggested care with Tylenol from various sources    Noted interaction between tramadol and amitriptyline and I have discussed that with her daughter    S/P insertion of intrathecal pump  I suggested the perioperative care team be aware of the presence of the intrathecal pump and from what I gather ,it usually does not get adjusted for surgery    Tremor  Presented to the ED after having 6 falls   Acute encephalopathy. Suspected due to polypharmacy, however infectious encephalopathy possible. Patient found to have significant BLE swelling with skin breakdown and erythema concerning for cellulitis    He finished the antibiotic    Anxiety  - Depression - she is on amitriptyline, Cymbalta, Seroquel  Doing fairly good from a mental health stand point   Not under psychiatry , Therapist care   Has supportive family   On Medication that is helping   No Suicidal / Homicidal ideation       Chronic, continuous use of opioids  Please see under chronic pain    She takes THC gummies   I suggested for her to work with the pain management doctor about it    COPD (chronic obstructive pulmonary disease)  - COPD (on 2L) continuously  She has not needing albuterol much suggesting that she might be doing good from a lung standpoint  On Spiriva    SOB on  exertion   She feels short of breath at rest - could be a component of  anxiety  She currently does not have any cough or phlegm  She has wheezing  PFT July 2023      Spirometry suggests restriction. Spirometry shows borderline improvement following bronchodilator. Lung volume determination is normal. Overall baseline spirometry shows severe ventilatory impairment. Airway mechanics are abnormal showing increased  airway resistance and decreased conductance. DLCO is moderately decreased.     No recent flare-up of COPD    Pulmonary optimization measures    I suggest the following to help with the pulmonary status in the perioperative period     Preoperative period     - Scheduled use of inhaler treatment    - Inhaler technique reinforcement    - Increased Physical activity   - Mucolytic / Expectorant treatment  - Mucinex  - Weight  loss that helps the pulmonary status-       Intra / post operative period     - Minimize use of sedating Medication- Opioid/ Benzodiazepine   - Consider use of regional block , to reduce the need for opioid treatment  - Consider opioid sparing anesthesia/Analgesia   - Avoid use of excessive Fio2 as it can reduce the hypoxic respiratory drive    - Consult Respiratory therapy , Physical therapy   - Cough, Deep breathing exercises   - Early ambulation   - Out of bed to chair   - Incentive spirometer use   -  Oral care , head end of bed elevation, Nutrition   - Oxygen saturation, End tidal CO2 monitoring    - Having patient in a monitored care area so that hemodynamics , respiratory status can be monitored     Her lung status seems to be at baseline and she tolerated these procedures in the past          Bilateral carotid artery disease  No stroke or TIA    2021- There is a small amount of calcified atherosclerosis in both carotid termini. No focal significant flow-limiting stenosis in the major intracranial arteries     Chronic diastolic heart failure  May 2024      Left Ventricle: The left  ventricle is normal in size. Normal wall thickness. There is normal systolic function. Biplane (2D) method of discs ejection fraction is 69%. Grade I diastolic dysfunction.    Right Ventricle: Normal right ventricular cavity size. Systolic function is normal. TAPSE is 3.66 cm.    Normal left atrial size. The left atrium volume index is 28.1 mL/m2.    Normal right atrial size.    The aortic valve is structurally normal. There is normal leaflet mobility.    The mitral valve is structurally normal. There is normal leaflet mobility.    The tricuspid valve is structurally normal. There is normal leaflet mobility.    Diastolic Dysfunction:  I  suggest watching her fluid status perioperatively and to watch out for fluid overload in view of her Diastolic Dysfunction.  I suggest avoidance of the ordering of continuous IV fluids and if IV fluids are required ,to order for a specific duration of time and consider ordering lower IV fluid rate      Coronary artery disease involving native coronary artery of native heart with angina pectoris  - ASA , Lipitor  2016 catheterization documents subtotaled left anterior descending artery with right-to-left collaterals.  She had a nuclear stress test June 2021 showing normal ejection fraction and normal perfusion study    She gets sharp chest discomfort on the left side of the chest that goes up to the shoulder when she gets up tight  No radiation  Along with the discomfort, she does not have any associated sweating short of breath nausea vomiting or diarrhea    She had this for long time  Lasts for a couple seconds/couple minutes  No positional element to the chest discomfort  So she finds belching and burping relieve the discomfort  Nonpleuritic in nature    She has a chest discomfort for long time and has no unstable symptoms    I offered her cardiac evaluation although her chest discomfort does not sound cardiac in nature and she and her daughter deferred it at this time    Aug  2023       1. Scintigraphically negative for ischemia or infarct.  2. The global left ventricular systolic function is within normal limits with an LV ejection fraction of 69 % and no evidence of LV dilatation. Wall motion is normal.       Pulmonary hypertension due to diastolic systemic ventricular dysfunction  Pulmonary arterial hypertension       WHO functional classification       Class 2- Mild limitation ( with reference to fatigue or dyspnea )  Class 3- Moderate Limitation ( with reference to fatigue or dyspnea)      Clinical classification - based on etiology       Group 2  Group 3       [place] heart association functional class       Class 2      Suggest avoidance of Intra vascular volume over load or reduced pre load       I suggest that the anaesthesiologist be aware of the Pulmonary artery hypertension , so that sedation,  anesthetic plans can be made with consideration of Pulmonary Hypertension  .     Suprapubic catheter  Please see under history of present illness    Bilateral cellulitis of lower leg  Was hospitalized recently for altered mental status and cellulitis   She just finished a course of antibiotic for cellulitis  Her legs are swollen and as per the daughter they were doing much better up until recently    Her leg swellings have returned  She is taking Lasix once a day  Not using excessively salted food    She has not been elevating her legs   Has been cleaning her house   Not sleeping    Not known to have   Varicose veins   Excessive Salted food   Long standing NSAID use     Not known to have     Heart failure   Liver failure   Kidney failure     Edema- I suggested avoidance of added salt,avoidance of NSAID's, unless advised or ordered  and suggested Limb elevation and marley hose use     History of staph infection  We discussed nasal decolonization but given that she has not going to have an open wound on the skin, daughter deferred it at this time    Normocytic anemia  Fluctuant   No overt  GI/ blood losses  No stomach upset/ ulcer   No Colon cancer in family   No long standing NSAID use- except ASA 81 mg daily  Discussed follow up      Adrenal insufficiency  She is on Florinef and hydrocortisone and is under endocrinology follow-up  Not known to have pituitary problem    Hyperprolactinoma     -Breast tenderness   -Nipple discharge       On thyroid replacement  She was taking thyroid replacement in the past but taking that with the other medication and having food but lately she is doing better with taking that on an empty stomach without other medication      No Increased hand/ Foot size       No  -Acne  - Striae     No          - Polyuria  -Polydipsia    I suggest consideration of stress dose steroids          BMI 34.0-34.9,adult  Weight related conditions     Known to have     Prediabetes -   Hyperlipidemia   Acid reflux   Osteoarthritis    Not troubled with / Not known to have     HTN  Type 2 Diabetes   Gout   Fatty liver       Encouraged maintaining healthy weight for improved health     Hypothyroid  No overt hypo thyroid symptoms    Esophageal dysphagia  She has history of esophageal dilation    on regular consistency diet and on thin liquids       Dysphagia- I suggest providing soft diet or  other wide directed  in view of the preexisting dysphagia as medication used in the perioperative period can possibly increase the dysphagia. I suggest aspiration precautions      GERD (gastroesophageal reflux disease)  Protonix  She has stopped drinking soft drinks    Does not sound Cardiac   Doing better with the reflux since cut back on soda    GERD-  I suggest continuation of the Proton pump inhibitor in the perioperative period . I suggest aspiration precautions     Frequent falls  Discussed fall precautions  Walking with a walker     Physical deconditioning  Suggested working on her strength     CECI (obstructive sleep apnea)  She was on Trilogy   No longer on it   ?  Insurance reasons  Sleep apnea      Informed the risk of worsening sleep apnea in the perioperative period      I suggest  caution with usage of medication that can cause respiratory suppression in the perioperative period    Avoidance of  supine sleep, weight gain , alcoholic beverages , care with , sedative , CNS depressant use indicated  since all of these can worsen CECI         I suggest follow up  and suggest caution with usage of medication that can cause respiratory suppression in the perioperative period  potential ramifications of untreated sleep apnea, which could include daytime sleepiness, hypertension, heart disease and stroke were discussed           Acute pain of right shoulder  Falls- from leg swellings    Any preceding chest pain, chest tightness, heart racing , dizziness, lightheadedness, Shortness of breath, one sided weakness, seizures          Renal impairment  Creatinine mildly elevated compared to the baseline likely from anti-inflammatory medication use    Hyponatremia  Mildly low      Preventive perioperative care    Thromboembolic prophylaxis:  Her risk factors for thrombosis include surgical procedure and age.I suggest  thromboembolic prophylaxis ( mechanical/pharmacological, weighing the risk benefits of pharmacological agent use considering skip procedural bleeding )  during the perioperative period.I suggested being active in the post operative period.      Postoperative pulmonary complication prophylaxis-Risk factors for post operative pulmonary complications include age over 65 years, ASA class >2, and COPD- I suggest incentive spirometry use, early ambulation, and end tidal carbon dioxide monitoring  , oral care , head end of bed elevation      Renal complication prophylaxis- . I suggest keeping her well hydrated  in the perioperative period.      Surgical site Infection Prophylaxis-I  suggest appropriate antibiotic for Prophylaxis against Surgical site infections  Skin antibacterial discussed       Delirium  prophylaxis-Risk factors - Advanced Age - I suggest avoidance / minimizing the use of  Benzodiazepines ( unless the patient has been taking it on a regular basis ),Anticholinergic medication,Antihistamines ( like  Benadryl).I suggest minimizing the use of opioid medication and use of IV tylenol,if it is appropriate. I suggest using the lowest possible dose of opioids for the shortest duration possible in the perioperative period. I suggest to Keep shades/blinds open during the day, lights off and shades closed at night to encourage normal sleep/wake cycle.I encourage the presence of the family member with the patient at all times, if at all possible as mental status changes can be picked up early by the family members and they help with reorientation. I encouraged the presence of family to help with orientation in the perioperative period. Benadryl avoidance suggested      In view of urological procedure the patient  is at risk of postoperative urinary retention.  I suggest avoidance / minimizing the of  Benzodiazepines,Anticholinergic medication,antihistamines ( Benadryl) , if possible in the perioperative period. I suggest using the minimum possible use of opioids for the minimum period of time in the perioperative period. Benadryl avoidance suggested      This visit was focused on Preoperative evaluation, Perioperative Medical management, complication reduction plans. I suggest that the patient follows up with primary care or relevant sub specialists for ongoing health care.    I appreciate the opportunity to be involved in this patients care. Please feel free to contact me if there were any questions about this consultation.    Patient is optimized    Patient/ care giver/ Family member was instructed to call and update me about any changes to health,  medication, office visits ,testing out side of the skip operative care center , hospitalizations between now and surgery      Gloria Ortiz MD  Internal  Medicine  [facility]   Cell Phone- [phone]    History of COVID - no   COVID vaccination status -Yes    COVID screening     No fever   No cough   No sore throat   No loss of taste or smell   No muscle aches   No nausea, vomiting , diarrhea -    In summary this 68 year female has multiple medical problems predominantly longstanding pain and respiratory problems   Her lung status is at her baseline   She has lower extremity edema which we discussed measures to help   We will let her proceed with her urological procedure that she has done in the past    Checked for over-the-counter medication      Pedal edema got better when she was at her daughter's place  She has not been elevating her legs   Has been cleaning her house   Not sleeping    Offered her cardiac evaluation and EKG that she did not take  --  6/20/2024- 1650     Comprehensive metabolic panel showed normal potassium -daughter was concerned that her potassium might be low given that she is on diuretic and not taking potassium supplementation  Reassuringly, her potassium was not low  Creatinine elevated compared to baseline at 1.3- her baseline creatinine is about 1- this may be low volume related  She took naproxen once a day for about a week and on and off prior to that for her shoulder pain  The likely cause of her elevated creatinine could be anti-inflammatory medication use  Deleterious effects NSAID's , Beneficial effects of Hydration discussed   No recent intravenous contrast use  Sodium is mildly low at 135  I suggesting optimally hydrated  Alk-phos is mildly elevated with normal bilirubin, ALT and AST  The likely cause of alkaline phosphatase elevation could be a bony source given the falls and shoulder injury    Called and spoke to daughter  Optimal hydration discussed  Discussed alk-phos elevation could be bone related    Stages of CKD discussed  Deleterious effects NSAID's , Beneficial effects of Hydration discussed   Tylenol (  If not intolerant ) as needed for pain     I  suggest monitoring renal function, in put and out put status skip-operatively. I  suggest avoiding nephrotoxic medication including NSAIDs, COX2 inhibitors, intravenous contrast agent,avoiding hypotension to prevent further renal impairment.   Care with intravenous contrast discussed    ----  6/21/2024- 1504     Corresponded with the surgeon about perioperative usage of aspirin and the plan is for her to stay on Aspirin  Message sent through the portal    Corresponded with the Endocrinologist -  If it's a minor procedure/surgery-40 mg of hydrocortisone the morning of the surgery and 20 the evening of, then go back to her regular home dose the following day. If it's a moderate or major surgery I usually recommend 100 mg of IV hydrocortisone on call to OR, then 50 mg IV q8hrs for 24 hrs, 25 mg q8 hrs for the next 24 hrs, then resume home dose on POD 2.

## 2024-06-20 NOTE — ASSESSMENT & PLAN NOTE
Patient with Hypercapnic and Hypoxic Respiratory failure which is Chronic.  she is on home oxygen at 2 LPM. Supplemental oxygen was provided and noted- [unfilled].   Signs/symptoms of respiratory failure include- tachypnea, increased work of breathing, and use of accessory muscles. Contributing diagnoses includes - Obesity Hypoventilation and pulmonary hypertension  Labs and images were reviewed. Patient Has not had a recent ABG. Will treat underlying causes and adjust management of respiratory failure as follows-   - hypoxia due to CHF, pulmonary hypertension and mixture of obesity hypoventilation and deconditioning. Patient does not have COPD based on PFTs and no evidence of emphysema on PFTs.   - Repeat walk test to evaluate functional status and oxygen requirements.   - hypercapnia due to CEIC/OHS. Will obtain sleep study when patient improves sleep time or else will be poor data set.

## 2024-06-20 NOTE — ASSESSMENT & PLAN NOTE
Weight related conditions     Known to have     Prediabetes -   Hyperlipidemia   Acid reflux   Osteoarthritis    Not troubled with / Not known to have     HTN  Type 2 Diabetes   Gout   Fatty liver       Encouraged maintaining healthy weight for improved health

## 2024-06-20 NOTE — ASSESSMENT & PLAN NOTE
- ASA , Lipitor  2016 catheterization documents subtotaled left anterior descending artery with right-to-left collaterals.  She had a nuclear stress test June 2021 showing normal ejection fraction and normal perfusion study    She gets sharp chest discomfort on the left side of the chest that goes up to the shoulder when she gets up tight  No radiation  Along with the discomfort, she does not have any associated sweating short of breath nausea vomiting or diarrhea    She had this for long time  Lasts for a couple seconds/couple minutes  No positional element to the chest discomfort  So she finds belching and burping relieve the discomfort  Nonpleuritic in nature    She has a chest discomfort for long time and has no unstable symptoms    I offered her cardiac evaluation although her chest discomfort does not sound cardiac in nature and she and her daughter deferred it at this time    Aug 2023       1. Scintigraphically negative for ischemia or infarct.  2. The global left ventricular systolic function is within normal limits with an LV ejection fraction of 69 % and no evidence of LV dilatation. Wall motion is normal.

## 2024-06-20 NOTE — ASSESSMENT & PLAN NOTE
Low back pain  She is under pain management    She has a intrathecal pump  She is on oral pain medication namely    Vicodin as needed-about 2 times a day   Tramadol as needed    Chronic continuous opioid use- In view of the opioid use, the patient may have opioid tolerance . I suggest considering the possibility of opioid tolerance  in planning post operative pain control     Discussed possibly reducing the opioid use in preparation for surgery , so that it reduces the opioid tolerance which helps post op pain control     Suggested letting pain management know about the up coming surgery      She uses medication to keep her bowels moving due to constipation from opioid pain medication    Her usual bowel regimen is about 2 times a week    Suggested working on bowel movements in preparation for surgery   Constipation- I suggest giving laxative regimen as opioid use,reduced ambulation  can increase the constipation

## 2024-06-20 NOTE — PROGRESS NOTES
Subjective:      Patient ID: Tasha Hawley is a 68 y.o. female.    Chief Complaint: Shortness of Breath and Hospital Follow Up    Pt is a 66 yo CW pmh chronic pain syndrome, HFpEF, narcotic dependency, neurogenic bladder s/p suprapubic catheter, CKD3a, hypothyroidism, obesity, GERD with hiatal hernia, chronic hypercapnic respiratory failure who presents for preop pulmonary risk stratification. Patient has had hospitalizations for PNA and acute on chronic diastolic heart failure. Has trouble with oxygen tanks.      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  Exposure hx:  worked on air conditions and worked with asbestos.  Inhaler use: Spiriva  PRN inhaler use: nebulizer     Patient concerned about tremors. Was in rehab facility ([facility]. They are trying to get home PT. Recently hospitalized for fluid overload. Potassium stopped. Poor sleep due to shoulder pain and tremors. Continues to have gait instability and concerned about falls. Notes worsening leg swelling compared to when discharged from hospital, but states they are much improved compared to when she presented to the hospital.     Review of Systems   Constitutional:  Positive for fatigue. Negative for weight loss, weight gain and activity change.   Respiratory:  Positive for sputum production, shortness of breath, dyspnea on extertion and use of rescue inhaler. Negative for cough, wheezing and previous hospitialization due to pulmonary problems.    Cardiovascular:  Positive for leg swelling. Negative for chest pain and palpitations.   Gastrointestinal:  Positive for vomiting and acid reflux. Negative for nausea.   Neurological:  Negative for syncope and light-headedness.   Psychiatric/Behavioral:  Negative for confusion and sleep disturbance. The patient is not nervous/anxious.      Objective:     Physical Exam   Constitutional: She is oriented to person, place, and time. She appears well-developed. She is  obese.   HENT:   Head: Normocephalic.   Cardiovascular: Normal rate, regular rhythm and normal heart sounds. Exam reveals no gallop and no friction rub.   No murmur heard.  Pulmonary/Chest: Symmetric chest wall expansion and effort normal. No stridor. No respiratory distress. She has decreased breath sounds. She has no wheezes. She has no rhonchi. She has no rales (bibasilar).   Musculoskeletal:         General: Edema (bilateral to hips) present.   Neurological: She is alert and oriented to person, place, and time. Gait abnormal.   Skin: No cyanosis. Nails show no clubbing.   Psychiatric: She has a normal mood and affect. Her behavior is normal. Judgment and thought content normal.   Vitals reviewed.    Personal Diagnostic Review  Chest x-ray: 7/8/23  Bilateral cephalization indicated increased pulmonary vascular congestion.      CT of chest performed on 3/24/23 PE protocol revealed LLL discoid atelectasis vs scarring. Hiatal hernia.      Echocardiogram: 4/29/23  There is no evidence of intracardiac shunting.  The left ventricle is normal in size with normal systolic function.  The estimated ejection fraction is 65%.  Normal left ventricular diastolic function.  Normal right ventricular size with normal right ventricular systolic function.  The estimated PA systolic pressure is 42 mmHg.  Normal central venous pressure (3 mmHg).     PFT: 7/18/23  FEV1:  1.25L (63.1%), +13.9%, +0.13L  FVC: 1.49L (53.1%)  FEV1/FVC: 83  TLC: 3.97L (83.3%)  RV: (127.9%)  DLCO: 8.99 (42.3%)     The test was completed without stopping. The total time walked was 360 seconds. During walking, the patient reported:  No complaints. The patient used a walker for assistance during testing.      07/18/2023---------Distance: 170.69 meters (560 feet)       O2 Sat % Supplemental Oxygen Heart Rate Blood Pressure Britta Scale   Pre-exercise  (Resting) 92 % Room Air 75 bpm 130/60 mmHg 0   During Exercise 93 % Room Air 83 bpm 127/58 mmHg 1  "  Post-exercise  (Recovery) 94 % Room Air  78 bpm          Recovery Time: 51 seconds     Performing nurse/tech: Samy MENDOZA     PREVIOUS STUDY:   The patient has not had a previous study.     CLINICAL INTERPRETATION:  Six minute walk distance is 170.69 meters (560 feet) with very light dyspnea.  During exercise, there was no significant desaturation while breathing room air.  Both blood pressure and heart rate remained stable with walking.  The patient did not report non-pulmonary symptoms during exercise.  Significant exercise impairment is likely due to subjective symptoms.  The patient did complete the study, walking 360 seconds of the 360 second test.  No previous study performed.  Based upon age and body mass index, exercise capacity is less than predicted.        2024     9:16 AM 2024     2:49 PM 2024     4:30 PM 2024    11:22 AM 2024     9:22 AM 2024     7:27 AM 2024     4:45 AM   Pulmonary Function Tests   SpO2 96 %  93 % 99 % 94 % 96 % 92 %   Height 5' 5" (1.651 m)         Weight 95.8 kg (211 lb 3.2 oz) 94.3 kg (207 lb 14.3 oz)        BMI (Calculated) 35.1           Assessment:     1. Chronic respiratory failure with hypoxia and hypercapnia    2. Pulmonary hypertension due to diastolic systemic ventricular dysfunction    3. Pulmonary air trapping    4. Acute pain of right shoulder    5. CECI (obstructive sleep apnea)    6. Physical deconditioning    7. Preoperative respiratory examination      Outpatient Encounter Medications as of 2024   Medication Sig Dispense Refill    [] acetaminophen (TYLENOL) 500 MG tablet Take 2 tablets (1,000 mg total) by mouth every 12 (twelve) hours as needed for Pain. 40 tablet 0    albuterol (PROVENTIL/VENTOLIN HFA) 90 mcg/actuation inhaler Inhale 2 puffs into the lungs every 6 (six) hours as needed for Shortness of Breath or Wheezing. Rescue 8 g 5    amitriptyline (ELAVIL) 25 MG tablet Take 1 tablet (25 mg total) by mouth every " evening. 90 tablet 0    aspirin (ECOTRIN) 81 MG EC tablet Take 1 tablet (81 mg total) by mouth once daily. (Patient not taking: Reported on 2024) 90 tablet 3    atorvastatin (LIPITOR) 80 MG tablet Take 1 tablet (80 mg total) by mouth once daily. 90 tablet 0    butalbital-acetaminophen-caffeine -40 mg (FIORICET, ESGIC) -40 mg per tablet Take 1 tablet by mouth daily as needed for Headaches.      calcium-vitamin D3 (OS-JACKIE 500 + D3) 500 mg-5 mcg (200 unit) per tablet Take 2 tablets by mouth 2 (two) times daily. 120 tablet 11    cyanocobalamin, vitamin B-12, 5,000 mcg Subl Place 1 tablet under the tongue once daily.      darifenacin (ENABLEX) 7.5 MG Tb24 Take 1 tablet (7.5 mg total) by mouth once daily. (Patient not taking: Reported on 2024) 30 tablet 11    docusate sodium (COLACE) 100 MG capsule Take 200 mg by mouth every evening.      [] doxycycline (VIBRAMYCIN) 100 MG Cap Take 1 capsule (100 mg total) by mouth every 12 (twelve) hours. for 10 days 20 capsule 0    DULoxetine (CYMBALTA) 60 MG capsule Take 1 capsule (60 mg total) by mouth 2 (two) times daily. 180 capsule 0    fludrocortisone (FLORINEF) 0.1 mg Tab Take a half-tablet (50 mcg) by mouth once daily 15 tablet 5    furosemide (LASIX) 40 MG tablet Take 1 tablet (40 mg total) by mouth once daily. 90 tablet 1    [] HYDROcodone-acetaminophen (NORCO)  mg per tablet Take 1 tablet by mouth every 6 (six) hours as needed for Pain. 20 tablet 0    hydrocortisone (CORTEF) 10 MG Tab Take 1 tablet (10 mg total) by mouth once daily. 90 tablet 0    hydrocortisone (CORTEF) 5 MG Tab Take 1 tablet (5 mg total) by mouth before dinner. 90 tablet 0    intrathecal pain pump compound Hydromorphone (7.5 mg/mL) infusion at 8.59 mg/day (0.3578 mg/hr) out of a total reservoir volume of 40 mL  Pump filled monthly      levothyroxine (SYNTHROID) 150 MCG tablet Take 1 tablet (150 mcg total) by mouth before breakfast. 90 tablet 0    []  methocarbamoL (ROBAXIN) 500 MG Tab Take 1 tablet (500 mg total) by mouth 4 (four) times daily as needed (pain). 30 tablet 0    nitroGLYCERIN (NITROSTAT) 0.4 MG SL tablet Place 0.4 mg under the tongue every 5 (five) minutes as needed for Chest pain.      pantoprazole (PROTONIX) 40 MG tablet Take 1 tablet (40 mg total) by mouth once daily. 90 tablet 0    QUEtiapine (SEROQUEL) 50 MG tablet Take 1 tablet (50 mg total) by mouth every evening. 60 tablet 0    senna (SENNA LAX) 8.6 mg tablet Take 2 tablets by mouth 2 (two) times daily.      tiotropium bromide (SPIRIVA RESPIMAT) 2.5 mcg/actuation inhaler Inhale 2 puffs into the lungs once daily. Controller. Please restart taking. 4 g 1    [DISCONTINUED] acetaminophen (TYLENOL) 500 MG tablet Take 2 tablets (1,000 mg total) by mouth every 8 (eight) hours. for 10 days 60 tablet 0    [DISCONTINUED] darifenacin (ENABLEX) 7.5 MG Tb24 Take 1 tablet (7.5 mg total) by mouth once daily. (Patient not taking: Reported on 5/22/2024) 30 tablet 0    [DISCONTINUED] furosemide (LASIX) 40 MG tablet Take 1 tablet (40 mg total) by mouth once daily. 90 tablet 0    [DISCONTINUED] HYDROcodone-acetaminophen (NORCO)  mg per tablet Take 1 tablet by mouth every 6 (six) hours as needed for Pain. 30 tablet 0    [DISCONTINUED] hydrocortisone (CORTEF) 10 MG Tab Take 1 tablet (10 mg total) by mouth every evening. 90 tablet 0    [DISCONTINUED] hydrocortisone (CORTEF) 10 MG Tab Take 1 tablet (10 mg total) by mouth once daily. for 10 days 10 tablet 0    [DISCONTINUED] hydrocortisone (CORTEF) 5 MG Tab Take 1 tablet (5 mg total) by mouth before dinner. for 10 days 10 tablet 0    [DISCONTINUED] hydrOXYzine (ATARAX) 50 MG tablet Take 1 tablet (50 mg total) by mouth every 4 (four) hours as needed for Anxiety. (Patient not taking: Reported on 5/22/2024) 30 tablet 0    [DISCONTINUED] potassium chloride (MICRO-K) 10 MEQ CpSR Take 2 capsules (20 mEq total) by mouth once daily. 180 capsule 0    [DISCONTINUED]  promethazine (PHENERGAN) 25 MG tablet Take 1 tablet (25 mg total) by mouth every 6 (six) hours as needed for Nausea. 20 tablet 0    [DISCONTINUED] QUEtiapine (SEROQUEL) 100 MG Tab TAKE 1 TABLET (100 MG) BY MOUTH NIGHTLY 180 tablet 0    [DISCONTINUED] tiotropium bromide (SPIRIVA RESPIMAT) 2.5 mcg/actuation inhaler Inhale 2 puffs into the lungs once daily. Controller (Patient not taking: Reported on 2024) 4 g 1    [DISCONTINUED] traMADoL (ULTRAM) 50 mg tablet Take 1 tablet (50 mg total) by mouth every 4 (four) hours as needed for Pain. 30 tablet 0    [DISCONTINUED] traZODone (DESYREL) 300 MG tablet Take 0.5 tablets (150 mg total) by mouth every evening. 90 tablet 0    [] ketorolac injection 15 mg       [DISCONTINUED] acetaminophen tablet 1,000 mg       [DISCONTINUED] acetaminophen tablet 650 mg       [DISCONTINUED] albuterol-ipratropium 2.5 mg-0.5 mg/3 mL nebulizer solution 3 mL       [DISCONTINUED] aluminum-magnesium hydroxide-simethicone 200-200-20 mg/5 mL suspension 30 mL       [DISCONTINUED] amitriptyline tablet 25 mg       [DISCONTINUED] atorvastatin tablet 80 mg       [DISCONTINUED] bisacodyL suppository 10 mg       [DISCONTINUED] DAPTOmycin (CUBICIN) 775 mg in sodium chloride 0.9% SolP 50 mL IVPB       [DISCONTINUED] dextrose 10% bolus 125 mL 125 mL       [DISCONTINUED] dextrose 10% bolus 250 mL 250 mL       [DISCONTINUED] docusate sodium capsule 200 mg       [DISCONTINUED] DULoxetine DR capsule 60 mg       [DISCONTINUED] enoxaparin injection 40 mg       [DISCONTINUED] fludrocortisone (FLORINEF) split tablet 50 mcg       [DISCONTINUED] furosemide injection 20 mg       [DISCONTINUED] furosemide tablet 40 mg       [DISCONTINUED] glucagon (human recombinant) injection 1 mg       [DISCONTINUED] glucose chewable tablet 16 g       [DISCONTINUED] glucose chewable tablet 24 g       [DISCONTINUED] HYDROcodone-acetaminophen 5-325 mg per tablet 1 tablet       [DISCONTINUED] hydrocortisone tablet 10 mg        [DISCONTINUED] hydrocortisone tablet 10 mg       [DISCONTINUED] hydrocortisone tablet 5 mg       [DISCONTINUED] hydrOXYzine HCL tablet 50 mg       [DISCONTINUED] levothyroxine tablet 150 mcg       [DISCONTINUED] linezolid 600 mg/300 mL IVPB 600 mg       [DISCONTINUED] melatonin tablet 6 mg       [DISCONTINUED] methocarbamoL tablet 500 mg       [DISCONTINUED] ondansetron disintegrating tablet 8 mg       [DISCONTINUED] oxybutynin 24 hr tablet 5 mg       [DISCONTINUED] pantoprazole EC tablet 40 mg       [DISCONTINUED] polyethylene glycol packet 17 g       [DISCONTINUED] polyethylene glycol packet 17 g       [DISCONTINUED] prochlorperazine injection Soln 5 mg       [DISCONTINUED] QUEtiapine tablet 100 mg       [DISCONTINUED] QUEtiapine tablet 50 mg       [DISCONTINUED] senna tablet 2 tablet       [DISCONTINUED] senna-docusate 8.6-50 mg per tablet 1 tablet       [DISCONTINUED] simethicone chewable tablet 80 mg       [DISCONTINUED] sodium chloride 0.9% flush 5 mL       [DISCONTINUED] tiotropium bromide 2.5 mcg/actuation inhaler 2 puff       [DISCONTINUED] traZODone tablet 150 mg        No facility-administered encounter medications on file as of 6/20/2024.     Orders Placed This Encounter   Procedures    Ambulatory referral/consult to Orthopedics     Standing Status:   Future     Standing Expiration Date:   7/20/2025     Referral Priority:   Routine     Referral Type:   Consultation     Requested Specialty:   Orthopedic Surgery     Number of Visits Requested:   1    Stress test, pulmonary     Standing Status:   Future     Number of Occurrences:   1     Standing Expiration Date:   6/20/2025     Scheduling Instructions:      Oxygen titration.     Order Specific Question:   Reason for study     Answer:   Oxygen prescription     Order Specific Question:   Release to patient     Answer:   Immediate     Plan:     CECI (obstructive sleep apnea)  Previously diagnosed. Unable to perform sleep study at this time due to poor sleep  secondary to right shoulder pain and tremors. Will repeat when able as patient would benefit from sleep apnea which is likely majority of cause of hypercapnic respiratory failure.     Chronic respiratory failure with hypoxia and hypercapnia  Patient with Hypercapnic and Hypoxic Respiratory failure which is Chronic.  she is on home oxygen at 2 LPM. Supplemental oxygen was provided and noted- [unfilled].   Signs/symptoms of respiratory failure include- tachypnea, increased work of breathing, and use of accessory muscles. Contributing diagnoses includes - Obesity Hypoventilation and pulmonary hypertension  Labs and images were reviewed. Patient Has not had a recent ABG. Will treat underlying causes and adjust management of respiratory failure as follows-   - hypoxia due to CHF, pulmonary hypertension and mixture of obesity hypoventilation and deconditioning. Patient does not have COPD based on PFTs and no evidence of emphysema on PFTs.   - Repeat walk test to evaluate functional status and oxygen requirements.   - hypercapnia due to CECI/OHS. Will obtain sleep study when patient improves sleep time or else will be poor data set.     Pulmonary air trapping  Noted on PFTs. Will continue spiriva and albuterol as needed. No evidence of obstruction on PFTs.     Acute pain of right shoulder  S/p mechanical fall onto right shoulder. Shoulder imaging negative for acute fracture. Will refer to orthopedic surgery for further evaluation.     Physical deconditioning  Secondary to recurrent hospitalizations as well as severe lymphedema significantly effecting mobility. Would highly benefit from ongoing PT.     Pulmonary hypertension due to diastolic systemic ventricular dysfunction  PASP of 43 on echo with DD. Chronic untreated CECI likely contributing. Will get sleep study when able.     Preoperative respiratory examination  ARISCAT score: 22 points (age, preoperative SaO2)  Low risk  1.6% risk of in-hospital post-op pulmonary  complications (composite including respiratory failure, respiratory infection, pleural effusion, atelectasis, pneumothorax, bronchospasm, aspiration pneumonitis)     If procedure lasts 2-3 hours would be intermediate risk with 13.3% risk of above complications.      KIMBERLYN postoperative respiratory failure:   3.7 %  Risk of mechanical ventilation for >48 hrs after surgery, or unplanned intubation ?30 days of surgery     KIMBERLYN postoperative pneumonia risk:   3.3 %  Risk of postoperative pneumonia    Follow up in 3 months.     Nick Chilel MD  [facility]

## 2024-06-20 NOTE — ASSESSMENT & PLAN NOTE
No dropping things , problems with smaller objects   Noted that the surgery is under monitored care anesthesia   In the event of her requiring intubation, I suggest that the anesthesiology team be aware of this

## 2024-06-20 NOTE — ASSESSMENT & PLAN NOTE
She has history of esophageal dilation    on regular consistency diet and on thin liquids       Dysphagia- I suggest providing soft diet or  other wide directed  in view of the preexisting dysphagia as medication used in the perioperative period can possibly increase the dysphagia. I suggest aspiration precautions

## 2024-06-20 NOTE — ASSESSMENT & PLAN NOTE
She is on Florinef and hydrocortisone and is under endocrinology follow-up  Not known to have pituitary problem    Hyperprolactinoma     -Breast tenderness   -Nipple discharge       On thyroid replacement  She was taking thyroid replacement in the past but taking that with the other medication and having food but lately she is doing better with taking that on an empty stomach without other medication      No Increased hand/ Foot size       No  -Acne  - Striae     No          - Polyuria  -Polydipsia    I suggest consideration of stress dose steroids

## 2024-06-20 NOTE — ASSESSMENT & PLAN NOTE
No stroke or TIA    2021- There is a small amount of calcified atherosclerosis in both carotid termini. No focal significant flow-limiting stenosis in the major intracranial arteries

## 2024-06-20 NOTE — ASSESSMENT & PLAN NOTE
She was on Trilogy   No longer on it   ?  Insurance reasons  Sleep apnea     Informed the risk of worsening sleep apnea in the perioperative period      I suggest  caution with usage of medication that can cause respiratory suppression in the perioperative period    Avoidance of  supine sleep, weight gain , alcoholic beverages , care with , sedative , CNS depressant use indicated  since all of these can worsen CECI         I suggest follow up  and suggest caution with usage of medication that can cause respiratory suppression in the perioperative period  potential ramifications of untreated sleep apnea, which could include daytime sleepiness, hypertension, heart disease and stroke were discussed

## 2024-06-20 NOTE — ASSESSMENT & PLAN NOTE
We discussed nasal decolonization but given that she has not going to have an open wound on the skin, daughter deferred it at this time

## 2024-06-20 NOTE — PROGRESS NOTES
[facility]  Progress Note    Patient Name: Tasha Hawley  MRN: [id number]  Date of Evaluation- 06/20/2024  PCP- Mesfin Hodges II, MD    Future cases for Tasha Hawley [[id number]]       Case ID Status Date Time Nico Procedure Provider Location    [id number] [facility] 6/25/2024  8:15 AM 60 CYSTOSCOPY,WITH BOTULINUM TOXIN INJECTION Shireen Mayo MD [[id number]] [facility] OR 1ST FLR            HPI:  History of present illness- I had the pleasure of meeting this pleasant 68 y.o. lady in the pre op clinic prior to her elective Urological surgery. The patient is known to me . Ms Cordova was accompanied by  Mr Pierson.  Had her daughter Ms. Gonzalez on the phone during the consultation process  I have obtained the information by speaking to her caring daughter who seems very knowledgeable and involved in her care    I have obtained the history by speaking to the patient and by reviewing the electronic health records.    Events leading up to surgery / History of presenting illness -    She has a neurogenic bladder and has a suprapubic tube but she has bladder spasms and has urine leak through the urethra  She has incontinence due to the leakage  She has had Botox injection in the past which helps her  She already had this done a few times in the past and is planning on having it a gain done on June 25 th  Based on what the information that I have obtained, she does not have any urinary tract infection at this time    As per chart     Neurogenic bladder with incomplete bladder emptying, and also stress incontinence.  She is managed with a suprapubic tube but gets bladder spasms       Relevant health conditions of significance for the perioperative period/ History of presenting illness -      Health conditions of significance for the perioperative period     - COPD (on 2L)  - Chronic BLE lymphedema  - Adrenal insufficiency  - 2016 catheterization documents subtotaled left anterior descending artery with  right-to-left collaterals.  She had a nuclear stress test June 2021 showing normal ejection fraction and normal perfusion study  - Chronic Respiratory failure   - BMI 34.45  - Constipation  - Opioid   - Acid reflux  - Hypothyroid   - History of Staph infection   - Migraine  - Depression  - Shoulder pain        Not known to have Prediabetes , Diabetes Mellitus,  Kidney problem, deep vein thrombosis, pulmonary embolism,   fatty liver , vascular stenting     Lives with   Single story house   Pet Dog, fish  Children - 2  Pregnancies - 2  No miscarriages  C sections - 2  Has help post op           Subjective/ Objective:     Chief complaint-Preoperative evaluation, Perioperative Medical management, complication reduction plan     Active cardiac conditions- none    Revised cardiac risk index predictors- none    Functional capacity -Examples of physical activity  , walks with walker, -- She can undertake all the above activities without  chest pain,chest tightness,  ,dizziness,lightheadedness making her exercise tolerance less  than 4 Mets.       Review of Systems   Constitutional:  Negative for chills and fever.   HENT:          Sleep apnea     Eyes:         No new visual changes   Respiratory:          No cough , phlegm     No Hemoptysis   Cardiovascular:         As noted   Endocrine:        Prednisone use > 20 mg daily for 3 weeks- none   Genitourinary:  Negative for dysuria and menstrual problem.   Musculoskeletal:         As above   Back pain better   Skin:  Negative for rash.   Neurological:  Negative for syncope.        No unilateral weakness   Hematological:         Current use of Anticoagulants  none   Psychiatric/Behavioral:            No SI/HI   No vascular stenting     Has osteoporosis  She had a complete hysterectomy 28 Y-for heavy cycles            No anesthesia, bleeding, cardiac problems, PONV  with previous surgeries/procedures.  Medications and Allergies reviewed in epic.   FH- No  "anesthesia,bleeding / venous thrombosis , in family      Physical Exam  Blood pressure 112/81, pulse 87, temperature 97.8 °F (36.6 °C), temperature source Oral, resp. rate 16, height 5' 5" (1.651 m), weight 93.9 kg (207 lb), SpO2 (!) 92%.      Investigations  Lab and Imaging have been reviewed in epic.    5/22/2024- No evidence of deep venous thrombosis in either lower extremity.     Review of Medicine tests    EKG- I had independently reviewed the EKG from--5/22/2024  It was reported to be showing     Normal sinus rhythm   Intraventricular conduction delay   Abnormal ECG   When compared with ECG of 16-MAY-2024 21:50,   No significant change was found     Review of clinical lab tests:  Lab Results   Component Value Date    CREATININE 0.9 05/27/2024    HGB 10.7 (L) 05/27/2024     05/27/2024         Review of old records- Was done and information gathered regards to events leading to surgery and health conditions of significance in the perioperative period         Review of old records- Was done and information gathered regards to events leading to surgery and health conditions of significance in the perioperative period.        Preoperative cardiac risk assessment-  The patient does not have any active cardiac conditions . Revised cardiac risk index predictors-0 ---.Functional capacity is less than 4 Mets. She will be undergoing a Urological procedure that carries a Moderate Risk risk     Risk of a major Cardiac event ( Defined as death, myocardial infarction, or cardiac arrest at 30 days after noncardiac surgery), based on RCRI score     -3.9%       No further cardiac work up is indicated prior to proceeding with the surgery          American Society of Anesthesiologists Physical status classification ( ASA ) class: 3     Postoperative pulmonary complication risk assessment:      ARISCAT ( [place]) risk index- risk class -  Low, if duration of surgery is under 2 hours, intermediate, if duration of surgery is " over 2 hours          Assessment/Plan:     Cervical myelopathy  No dropping things , problems with smaller objects   Noted that the surgery is under monitored care anesthesia   In the event of her requiring intubation, I suggest that the anesthesiology team be aware of this    Chronic pain syndrome  Low back pain  She is under pain management    She has a intrathecal pump  She is on oral pain medication namely    Vicodin as needed-about 2 times a day   Tramadol as needed    Chronic continuous opioid use- In view of the opioid use, the patient may have opioid tolerance . I suggest considering the possibility of opioid tolerance  in planning post operative pain control     Discussed possibly reducing the opioid use in preparation for surgery , so that it reduces the opioid tolerance which helps post op pain control     Suggested letting pain management know about the up coming surgery      She uses medication to keep her bowels moving due to constipation from opioid pain medication    Her usual bowel regimen is about 2 times a week    Suggested working on bowel movements in preparation for surgery   Constipation- I suggest giving laxative regimen as opioid use,reduced ambulation  can increase the constipation        Migraine  She is doing good from a migraine standpoint and he is taking Fioricet as needed   I suggested care with Tylenol from various sources    Noted interaction between tramadol and amitriptyline and I have discussed that with her daughter    S/P insertion of intrathecal pump  I suggested the perioperative care team be aware of the presence of the intrathecal pump and from what I gather ,it usually does not get adjusted for surgery    Tremor  Presented to the ED after having 6 falls   Acute encephalopathy. Suspected due to polypharmacy, however infectious encephalopathy possible. Patient found to have significant BLE swelling with skin breakdown and erythema concerning for cellulitis    He finished the  antibiotic    Anxiety  - Depression - she is on amitriptyline, Cymbalta, Seroquel  Doing fairly good from a mental health stand point   Not under psychiatry , Therapist care   Has supportive family   On Medication that is helping   No Suicidal / Homicidal ideation       Chronic, continuous use of opioids  Please see under chronic pain    She takes THC gummies   I suggested for her to work with the pain management doctor about it    COPD (chronic obstructive pulmonary disease)  - COPD (on 2L) continuously  She has not needing albuterol much suggesting that she might be doing good from a lung standpoint  On Spiriva    SOB on exertion   She feels short of breath at rest - could be a component of  anxiety  She currently does not have any cough or phlegm  She has wheezing  PFT July 2023      Spirometry suggests restriction. Spirometry shows borderline improvement following bronchodilator. Lung volume determination is normal. Overall baseline spirometry shows severe ventilatory impairment. Airway mechanics are abnormal showing increased  airway resistance and decreased conductance. DLCO is moderately decreased.     No recent flare-up of COPD    Pulmonary optimization measures    I suggest the following to help with the pulmonary status in the perioperative period     Preoperative period     - Scheduled use of inhaler treatment    - Inhaler technique reinforcement    - Increased Physical activity   - Mucolytic / Expectorant treatment  - Mucinex  - Weight  loss that helps the pulmonary status-       Intra / post operative period     - Minimize use of sedating Medication- Opioid/ Benzodiazepine   - Consider use of regional block , to reduce the need for opioid treatment  - Consider opioid sparing anesthesia/Analgesia   - Avoid use of excessive Fio2 as it can reduce the hypoxic respiratory drive    - Consult Respiratory therapy , Physical therapy   - Cough, Deep breathing exercises   - Early ambulation   - Out of bed to chair    - Incentive spirometer use   -  Oral care , head end of bed elevation, Nutrition   - Oxygen saturation, End tidal CO2 monitoring    - Having patient in a monitored care area so that hemodynamics , respiratory status can be monitored     Her lung status seems to be at baseline and she tolerated these procedures in the past          Bilateral carotid artery disease  No stroke or TIA    2021- There is a small amount of calcified atherosclerosis in both carotid termini. No focal significant flow-limiting stenosis in the major intracranial arteries     Chronic diastolic heart failure  May 2024      Left Ventricle: The left ventricle is normal in size. Normal wall thickness. There is normal systolic function. Biplane (2D) method of discs ejection fraction is 69%. Grade I diastolic dysfunction.    Right Ventricle: Normal right ventricular cavity size. Systolic function is normal. TAPSE is 3.66 cm.    Normal left atrial size. The left atrium volume index is 28.1 mL/m2.    Normal right atrial size.    The aortic valve is structurally normal. There is normal leaflet mobility.    The mitral valve is structurally normal. There is normal leaflet mobility.    The tricuspid valve is structurally normal. There is normal leaflet mobility.    Diastolic Dysfunction:  I  suggest watching her fluid status perioperatively and to watch out for fluid overload in view of her Diastolic Dysfunction.  I suggest avoidance of the ordering of continuous IV fluids and if IV fluids are required ,to order for a specific duration of time and consider ordering lower IV fluid rate      Coronary artery disease involving native coronary artery of native heart with angina pectoris  - ASA , Lipitor  2016 catheterization documents subtotaled left anterior descending artery with right-to-left collaterals.  She had a nuclear stress test June 2021 showing normal ejection fraction and normal perfusion study    She gets sharp chest discomfort on the left side  of the chest that goes up to the shoulder when she gets up tight  No radiation  Along with the discomfort, she does not have any associated sweating short of breath nausea vomiting or diarrhea    She had this for long time  Lasts for a couple seconds/couple minutes  No positional element to the chest discomfort  So she finds belching and burping relieve the discomfort  Nonpleuritic in nature    She has a chest discomfort for long time and has no unstable symptoms    I offered her cardiac evaluation although her chest discomfort does not sound cardiac in nature and she and her daughter deferred it at this time    Aug 2023       1. Scintigraphically negative for ischemia or infarct.  2. The global left ventricular systolic function is within normal limits with an LV ejection fraction of 69 % and no evidence of LV dilatation. Wall motion is normal.       Pulmonary hypertension due to diastolic systemic ventricular dysfunction  Pulmonary arterial hypertension       WHO functional classification       Class 2- Mild limitation ( with reference to fatigue or dyspnea )  Class 3- Moderate Limitation ( with reference to fatigue or dyspnea)      Clinical classification - based on etiology       Group 2  Group 3       [place] heart association functional class       Class 2      Suggest avoidance of Intra vascular volume over load or reduced pre load       I suggest that the anaesthesiologist be aware of the Pulmonary artery hypertension , so that sedation,  anesthetic plans can be made with consideration of Pulmonary Hypertension  .     Suprapubic catheter  Please see under history of present illness    Bilateral cellulitis of lower leg  Was hospitalized recently for altered mental status and cellulitis   She just finished a course of antibiotic for cellulitis  Her legs are swollen and as per the daughter they were doing much better up until recently    Her leg swellings have returned  She is taking Lasix once a day  Not  using excessively salted food    She has not been elevating her legs   Has been cleaning her house   Not sleeping    Not known to have   Varicose veins   Excessive Salted food   Long standing NSAID use     Not known to have     Heart failure   Liver failure   Kidney failure     Edema- I suggested avoidance of added salt,avoidance of NSAID's, unless advised or ordered  and suggested Limb elevation and marley hose use     History of staph infection  We discussed nasal decolonization but given that she has not going to have an open wound on the skin, daughter deferred it at this time    Normocytic anemia  Fluctuant   No overt GI/ blood losses  No stomach upset/ ulcer   No Colon cancer in family   No long standing NSAID use- except ASA 81 mg daily  Discussed follow up      Adrenal insufficiency  She is on Florinef and hydrocortisone and is under endocrinology follow-up  Not known to have pituitary problem    Hyperprolactinoma     -Breast tenderness   -Nipple discharge       On thyroid replacement  She was taking thyroid replacement in the past but taking that with the other medication and having food but lately she is doing better with taking that on an empty stomach without other medication      No Increased hand/ Foot size       No  -Acne  - Striae     No          - Polyuria  -Polydipsia    I suggest consideration of stress dose steroids          BMI 34.0-34.9,adult  Weight related conditions     Known to have     Prediabetes -   Hyperlipidemia   Acid reflux   Osteoarthritis    Not troubled with / Not known to have     HTN  Type 2 Diabetes   Gout   Fatty liver       Encouraged maintaining healthy weight for improved health     Hypothyroid  No overt hypo thyroid symptoms    Esophageal dysphagia  She has history of esophageal dilation    on regular consistency diet and on thin liquids       Dysphagia- I suggest providing soft diet or  other wide directed  in view of the preexisting dysphagia as medication used in the  perioperative period can possibly increase the dysphagia. I suggest aspiration precautions      GERD (gastroesophageal reflux disease)  Protonix  She has stopped drinking soft drinks    Does not sound Cardiac   Doing better with the reflux since cut back on soda    GERD-  I suggest continuation of the Proton pump inhibitor in the perioperative period . I suggest aspiration precautions     Frequent falls  Discussed fall precautions  Walking with a walker     Physical deconditioning  Suggested working on her strength     Sleep apnea  She was on Trilogy   No longer on it   ?  Insurance reasons  Sleep apnea     Informed the risk of worsening sleep apnea in the perioperative period      I suggest  caution with usage of medication that can cause respiratory suppression in the perioperative period    Avoidance of  supine sleep, weight gain , alcoholic beverages , care with , sedative , CNS depressant use indicated  since all of these can worsen CECI         I suggest follow up  and suggest caution with usage of medication that can cause respiratory suppression in the perioperative period  potential ramifications of untreated sleep apnea, which could include daytime sleepiness, hypertension, heart disease and stroke were discussed           Acute pain of right shoulder  Falls- from leg swellings    Any preceding chest pain, chest tightness, heart racing , dizziness, lightheadedness, Shortness of breath, one sided weakness, seizures            Preventive perioperative care    Thromboembolic prophylaxis:  Her risk factors for thrombosis include surgical procedure and age.I suggest  thromboembolic prophylaxis ( mechanical/pharmacological, weighing the risk benefits of pharmacological agent use considering skip procedural bleeding )  during the perioperative period.I suggested being active in the post operative period.      Postoperative pulmonary complication prophylaxis-Risk factors for post operative pulmonary complications  include age over 65 years, ASA class >2, and COPD- I suggest incentive spirometry use, early ambulation, and end tidal carbon dioxide monitoring  , oral care , head end of bed elevation      Renal complication prophylaxis- . I suggest keeping her well hydrated  in the perioperative period.      Surgical site Infection Prophylaxis-I  suggest appropriate antibiotic for Prophylaxis against Surgical site infections  Skin antibacterial discussed       Delirium prophylaxis-Risk factors - Advanced Age - I suggest avoidance / minimizing the use of  Benzodiazepines ( unless the patient has been taking it on a regular basis ),Anticholinergic medication,Antihistamines ( like  Benadryl).I suggest minimizing the use of opioid medication and use of IV tylenol,if it is appropriate. I suggest using the lowest possible dose of opioids for the shortest duration possible in the perioperative period. I suggest to Keep shades/blinds open during the day, lights off and shades closed at night to encourage normal sleep/wake cycle.I encourage the presence of the family member with the patient at all times, if at all possible as mental status changes can be picked up early by the family members and they help with reorientation. I encouraged the presence of family to help with orientation in the perioperative period. Benadryl avoidance suggested      In view of urological procedure the patient  is at risk of postoperative urinary retention.  I suggest avoidance / minimizing the of  Benzodiazepines,Anticholinergic medication,antihistamines ( Benadryl) , if possible in the perioperative period. I suggest using the minimum possible use of opioids for the minimum period of time in the perioperative period. Benadryl avoidance suggested      This visit was focused on Preoperative evaluation, Perioperative Medical management, complication reduction plans. I suggest that the patient follows up with primary care or relevant sub specialists for ongoing  health care.    I appreciate the opportunity to be involved in this patients care. Please feel free to contact me if there were any questions about this consultation.    Patient is optimized    Patient/ care giver/ Family member was instructed to call and update me about any changes to health,  medication, office visits ,testing out side of the skip operative care center , hospitalizations between now and surgery      Gloria Ortiz MD  Internal Medicine  [facility]   Cell Phone- [phone]    History of COVID - no   COVID vaccination status -Yes    COVID screening     No fever   No cough   No sore throat   No loss of taste or smell   No muscle aches   No nausea, vomiting , diarrhea -    In summary this 68 year female has multiple medical problems predominantly longstanding pain and respiratory problems   Her lung status is at her baseline   She has lower extremity edema which we discussed measures to help   We will let her proceed with her urological procedure that she has done in the past

## 2024-06-20 NOTE — ASSESSMENT & PLAN NOTE
Noted on PFTs. Will continue spiriva and albuterol as needed. No evidence of obstruction on PFTs.

## 2024-06-20 NOTE — ASSESSMENT & PLAN NOTE
PASP of 43 on echo with DD. Chronic untreated CECI likely contributing. Will get sleep study when able.

## 2024-06-20 NOTE — ASSESSMENT & PLAN NOTE
She is doing good from a migraine standpoint and he is taking Fioricet as needed   I suggested care with Tylenol from various sources    Noted interaction between tramadol and amitriptyline and I have discussed that with her daughter

## 2024-06-20 NOTE — ASSESSMENT & PLAN NOTE
ARISCAT score: 22 points (age, preoperative SaO2)  Low risk  1.6% risk of in-hospital post-op pulmonary complications (composite including respiratory failure, respiratory infection, pleural effusion, atelectasis, pneumothorax, bronchospasm, aspiration pneumonitis)     If procedure lasts 2-3 hours would be intermediate risk with 13.3% risk of above complications.      KIMBERLYN postoperative respiratory failure:   3.7 %  Risk of mechanical ventilation for >48 hrs after surgery, or unplanned intubation ?30 days of surgery     KIMBERLYN postoperative pneumonia risk:   3.3 %  Risk of postoperative pneumonia

## 2024-06-20 NOTE — HPI
History of present illness- I had the pleasure of meeting this pleasant 68 y.o. lady in the pre op clinic prior to her elective Urological surgery. The patient is known to me . Ms Cordova was accompanied by  Mr Pierson.  Had her daughter Ms. Gonzalez on the phone during the consultation process  I have obtained the information by speaking to her caring daughter who seems very knowledgeable and involved in her care    I have obtained the history by speaking to the patient and by reviewing the electronic health records.    Events leading up to surgery / History of presenting illness -    She has a neurogenic bladder and has a suprapubic tube but she has bladder spasms and has urine leak through the urethra  She has incontinence due to the leakage  She has had Botox injection in the past which helps her  She already had this done a few times in the past and is planning on having it a gain done on June 25 th  Based on what the information that I have obtained, she does not have any urinary tract infection at this time    As per chart     Neurogenic bladder with incomplete bladder emptying, and also stress incontinence.  She is managed with a suprapubic tube but gets bladder spasms       Relevant health conditions of significance for the perioperative period/ History of presenting illness -      Health conditions of significance for the perioperative period     - COPD (on 2L)  - Chronic BLE lymphedema  - Adrenal insufficiency  - 2016 catheterization documents subtotaled left anterior descending artery with right-to-left collaterals.  She had a nuclear stress test June 2021 showing normal ejection fraction and normal perfusion study  - Chronic Respiratory failure   - BMI 34.45  - Constipation  - Opioid   - Acid reflux  - Hypothyroid   - History of Staph infection   - Migraine  - Depression  - Shoulder pain        Not known to have Prediabetes , Diabetes Mellitus,  Kidney problem, deep vein thrombosis, pulmonary embolism,    fatty liver , vascular stenting     Lives with   Single story house   Pet Dog, fish  Children - 2  Pregnancies - 2  No miscarriages  C sections - 2  Has help post op

## 2024-06-20 NOTE — ASSESSMENT & PLAN NOTE
Previously diagnosed. Unable to perform sleep study at this time due to poor sleep secondary to right shoulder pain and tremors. Will repeat when able as patient would benefit from sleep apnea which is likely majority of cause of hypercapnic respiratory failure.

## 2024-06-20 NOTE — ASSESSMENT & PLAN NOTE
Protonix  She has stopped drinking soft drinks    Does not sound Cardiac   Doing better with the reflux since cut back on soda    GERD-  I suggest continuation of the Proton pump inhibitor in the perioperative period . I suggest aspiration precautions

## 2024-06-20 NOTE — ASSESSMENT & PLAN NOTE
S/p mechanical fall onto right shoulder. Shoulder imaging negative for acute fracture. Will refer to orthopedic surgery for further evaluation.

## 2024-06-20 NOTE — ASSESSMENT & PLAN NOTE
Falls- from leg swellings    Any preceding chest pain, chest tightness, heart racing , dizziness, lightheadedness, Shortness of breath, one sided weakness, seizures

## 2024-06-20 NOTE — ASSESSMENT & PLAN NOTE
Secondary to recurrent hospitalizations as well as severe lymphedema significantly effecting mobility. Would highly benefit from ongoing PT.

## 2024-06-20 NOTE — ASSESSMENT & PLAN NOTE
- Depression - she is on amitriptyline, Cymbalta, Seroquel  Doing fairly good from a mental health stand point   Not under psychiatry , Therapist care   Has supportive family   On Medication that is helping   No Suicidal / Homicidal ideation

## 2024-06-20 NOTE — ASSESSMENT & PLAN NOTE
Pulmonary arterial hypertension       WHO functional classification       Class 2- Mild limitation ( with reference to fatigue or dyspnea )  Class 3- Moderate Limitation ( with reference to fatigue or dyspnea)      Clinical classification - based on etiology       Group 2  Group 3       [place] heart association functional class       Class 2      Suggest avoidance of Intra vascular volume over load or reduced pre load       I suggest that the anaesthesiologist be aware of the Pulmonary artery hypertension , so that sedation,  anesthetic plans can be made with consideration of Pulmonary Hypertension  .

## 2024-06-21 ENCOUNTER — OFFICE VISIT (OUTPATIENT)
Dept: ORTHOPEDICS | Facility: CLINIC | Age: 69
End: 2024-06-21
Payer: MEDICARE

## 2024-06-21 ENCOUNTER — OUTPATIENT CASE MANAGEMENT (OUTPATIENT)
Dept: ADMINISTRATIVE | Facility: OTHER | Age: 69
End: 2024-06-21
Payer: MEDICARE

## 2024-06-21 ENCOUNTER — PATIENT MESSAGE (OUTPATIENT)
Dept: INTERNAL MEDICINE | Facility: CLINIC | Age: 69
End: 2024-06-21
Payer: MEDICARE

## 2024-06-21 ENCOUNTER — HOSPITAL ENCOUNTER (OUTPATIENT)
Dept: RADIOLOGY | Facility: HOSPITAL | Age: 69
Discharge: HOME | End: 2024-06-21
Attending: PHYSICIAN ASSISTANT
Payer: MEDICARE

## 2024-06-21 VITALS — BODY MASS INDEX: 34.49 KG/M2 | HEIGHT: 65 IN | WEIGHT: 207 LBS

## 2024-06-21 DIAGNOSIS — M19.011 ARTHRITIS OF RIGHT GLENOHUMERAL JOINT: ICD-10-CM

## 2024-06-21 DIAGNOSIS — M25.511 ACUTE PAIN OF RIGHT SHOULDER: ICD-10-CM

## 2024-06-21 DIAGNOSIS — S46.011A TRAUMATIC COMPLETE TEAR OF RIGHT ROTATOR CUFF, INITIAL ENCOUNTER: Primary | ICD-10-CM

## 2024-06-21 PROCEDURE — 73030 X-RAY EXAM OF SHOULDER: CPT | Mod: 26,RT,, | Performed by: RADIOLOGY

## 2024-06-21 PROCEDURE — 99999 PR PBB SHADOW E&M-EST. PATIENT-LVL V: CPT | Mod: PBBFAC,,, | Performed by: PHYSICIAN ASSISTANT

## 2024-06-21 PROCEDURE — 73030 X-RAY EXAM OF SHOULDER: CPT | Mod: TC,PN,RT

## 2024-06-21 RX ORDER — TRIAMCINOLONE ACETONIDE 40 MG/ML
40 INJECTION, SUSPENSION INTRA-ARTICULAR; INTRAMUSCULAR
Status: DISCONTINUED | OUTPATIENT
Start: 2024-06-21 | End: 2024-06-21 | Stop reason: HOSPADM

## 2024-06-21 RX ADMIN — TRIAMCINOLONE ACETONIDE 40 MG: 40 INJECTION, SUSPENSION INTRA-ARTICULAR; INTRAMUSCULAR at 10:06

## 2024-06-21 NOTE — PROCEDURES
Large Joint Aspiration/Injection: R subacromial bursa    Date/Time: 6/21/2024 10:00 AM    Performed by: Rebecca Xiong PA-C  Authorized by: Rebecca Xiong PA-C    Consent Done?:  Yes (Verbal)  Indications:  Pain  Site marked: the procedure site was marked    Timeout: prior to procedure the correct patient, procedure, and site was verified    Prep: patient was prepped and draped in usual sterile fashion      Local anesthesia used?: Yes    Local anesthetic:  Lidocaine 1% without epinephrine and topical anesthetic    Details:  Needle Size:  21 G  Ultrasonic Guidance for needle placement?: No    Approach:  Posterior  Location:  Shoulder  Site:  R subacromial bursa  Medications:  40 mg triamcinolone acetonide 40 mg/mL  Patient tolerance:  Patient tolerated the procedure well with no immediate complications

## 2024-06-21 NOTE — PROCEDURES
Tasha Hawley is a 68 y.o.  female patient, who presents for a 6 minute walk test ordered by MD Getachew.  The diagnosis is Shortness of Breath; Diastolic CHF.  The patient's BMI is 35.1 kg/m2. Predicted distance (lower limit of normal) is 268.37 meters.    Test Results:    The test was not completed.  The patient stopped 1 time for a total of 170 seconds.  The total time walked was 190 seconds.  During walking, the patient reported:  Leg pain, Dyspnea, Lightheadedness, Dizziness; Shoulder Pain.  The patient used a walker during testing.     06/20/2024---------Distance: 60.96 meters (200 feet)     O2 Sat % Supplemental Oxygen Heart Rate Blood Pressure Britta Scale   Pre-exercise  (Resting) 94 % Room Air 77 bpm 113/70 mmHg 3   During Exercise 80 % Room Air 79 bpm 122/61 mmHg 5-6   Post-exercise   91 % Room Air  78 bpm       Recovery Time: 169 seconds    Oxygen Qualification:     O2 Sat % Supplemental Oxygen Heart Rate Blood Pressure Britta Scale   Pre-exercise  (Resting) 98 % 2 L/M  82 bpm  115/77 mmHg 3    During Exercise 96 %  2 L/M  91 bpm  127/63 mmHg 3    Post-exercise   98 %  2 L/M  80 bpm        Performing nurse/tech: EUSEBIA Stevenson      PREVIOUS STUDY:   07/18/2023---------Distance: 170.69 meters (560 feet)       O2 Sat % Supplemental Oxygen Heart Rate Blood Pressure Britta Scale   Pre-exercise  (Resting) 92 % Room Air 75 bpm 130/60 mmHg 0   During Exercise 93 % Room Air 83 bpm 127/58 mmHg 1   Post-exercise  (Recovery) 94 % Room Air  78 bpm           CLINICAL INTERPRETATION:  Six minute walk distance is 60.96 meters (200 feet) with heavy dyspnea.  During exercise, there was significant desaturation while breathing room air.  Both blood pressure and heart rate remained stable with walking.  The patient reported non-pulmonary symptoms during exercise.  Severe exercise impairment is likely due to desaturation and subjective symptoms.  The patient did not complete the study, walking 190 seconds of the 360  second test.  The patient may benefit from using supplemental oxygen during exertion.  Since the previous study in July 2023, exercise capacity is significantly worse.  Based upon age and body mass index, exercise capacity is less than predicted.   Oxygen saturation did improve while breathing supplemental oxygen.

## 2024-06-21 NOTE — PROGRESS NOTES
Outpatient Care Management   - Care Plan Follow Up    Patient: Tasha Hawley  MRN:  [id number]  Date of Service:  6/21/2024  Completed by:  Tatiana Guzman LCSW  Referral Date: 05/24/2024    Reason for Visit   Patient presents with    OPCM SW Follow Up Call     06/21/2024       Brief Summary: Spoke with patient who reports she is currently sitting in the car waiting for her spouse to come out of a store. Patient reports she has not received the mailed resources yet. Patient states she has been having falls lately but has been using her walker. SW inquired if patient would consider staying with her daughter again, since patient was improving while there. Patient adamantly refuses to return to her daughters house at this time. Patient requested an update regarding HH; per chart, OPCM RN is in communication with patient's daughter attempting to obtain HH. Patient's insurance is a barrier to finding an in-network HH. Patient's daughter was given contact information for [facility] to call and talk about patient's current [facility] plan that is limiting her access to HH. Discussed LTPCS/CCW and offered to call with patient. Patient's spouse then re-entered the car, and patient asked him if he would mind sitters coming to the home-- patient's spouse does not want this. SW reiterated to patient that this is her decision, and that patient is having falls and does appear to need more help in the home. Patient agrees and reports she is open to discussing LTPCS/CCW more with SW next week.    Complex Care Plan     Care plan was discussed and completed today with input from patient and/or caregiver.    Patient Instructions     No follow-ups on file.

## 2024-06-21 NOTE — PROGRESS NOTES
Subjective:      Patient ID: Tasha Hawley is a 68 y.o. female.    Chief Complaint: Pain of the Right Shoulder      69yo F phmx multiple co morbidities including CHF, chronic pain (on chronic opioids/ intrathecal pump) , on chronic steroids presents with couple week history of right lateral shoulder pain. States she has sustained multiple falls directly onto her shoulder. Now unable to lift arm up. Unable to hold certain objects in her hand due to pain. Does have cervical myelopathy. Currently taking current pain medication- Vicodin, dilaudid intrathecal pump, tramadol which is not helping her symptoms.   Patients  was in the room. Daughter on the phone.         Review of Systems   Constitutional: Negative for chills and fever.   Cardiovascular:  Negative for chest pain.   Respiratory:  Negative for cough.    Hematologic/Lymphatic: Does not bruise/bleed easily.   Skin:  Negative for poor wound healing and rash.   Musculoskeletal:  Positive for arthritis, back pain, falls, joint pain, muscle weakness, myalgias, neck pain and stiffness.   Gastrointestinal:  Negative for abdominal pain.   Genitourinary:  Negative for bladder incontinence.   Neurological:  Negative for dizziness, loss of balance and weakness.   Psychiatric/Behavioral:  Negative for altered mental status.        Review of patient's allergies indicates:   Allergen Reactions    (d)-limonene flavor      Other reaction(s): difficult intubation  Other reaction(s): Difficulty breathing    Benadryl [diphenhydramine hcl] Shortness Of Breath    Fentanyl Itching, Nausea And Vomiting and Swelling             Imitrex [sumatriptan succinate] Shortness Of Breath    Oxycodone-acetaminophen Shortness Of Breath    Sulfamethoxazole-trimethoprim Anaphylaxis    Topiramate Shortness Of Breath    Vancomycin Anaphylaxis     Rash    Butorphanol tartrate     Darvocet a500 [propoxyphene n-acetaminophen]      Other reaction(s): Difficulty breathing    Evening primrose  (oenothera biennis)     Latex     Pregabalin Other (See Comments)     tremors    Sumatriptan     White petrolatum-zinc     Zinc acetate     Zinc oxide-white petrolatum      Other reaction(s): Difficulty breathing    Latex, natural rubber Itching and Rash    Phenytoin Rash and Other (See Comments)     Trouble breathing        Current Outpatient Medications   Medication Sig Dispense Refill    albuterol (PROVENTIL/VENTOLIN HFA) 90 mcg/actuation inhaler Inhale 2 puffs into the lungs every 6 (six) hours as needed for Shortness of Breath or Wheezing. Rescue 8 g 5    amitriptyline (ELAVIL) 25 MG tablet Take 1 tablet (25 mg total) by mouth every evening. 90 tablet 0    atorvastatin (LIPITOR) 80 MG tablet Take 1 tablet (80 mg total) by mouth once daily. 90 tablet 0    butalbital-acetaminophen-caffeine -40 mg (FIORICET, ESGIC) -40 mg per tablet Take 1 tablet by mouth daily as needed for Headaches.      calcium-vitamin D3 (OS-JACKIE 500 + D3) 500 mg-5 mcg (200 unit) per tablet Take 2 tablets by mouth 2 (two) times daily. 120 tablet 11    cyanocobalamin, vitamin B-12, 5,000 mcg Subl Place 1 tablet under the tongue once daily.      darifenacin (ENABLEX) 7.5 MG Tb24 Take 1 tablet (7.5 mg total) by mouth once daily. 30 tablet 11    docusate sodium (COLACE) 100 MG capsule Take 200 mg by mouth every evening.      DULoxetine (CYMBALTA) 60 MG capsule Take 1 capsule (60 mg total) by mouth 2 (two) times daily. 180 capsule 0    fludrocortisone (FLORINEF) 0.1 mg Tab Take a half-tablet (50 mcg) by mouth once daily 15 tablet 5    furosemide (LASIX) 40 MG tablet Take 1 tablet (40 mg total) by mouth once daily. 90 tablet 1    HYDROcodone-acetaminophen (NORCO)  mg per tablet Take 1 tablet by mouth every 6 (six) hours as needed for Pain.      hydrocortisone (CORTEF) 10 MG Tab Take 1 tablet (10 mg total) by mouth once daily. 90 tablet 0    hydrocortisone (CORTEF) 5 MG Tab Take 1 tablet (5 mg total) by mouth before dinner. 90  "tablet 0    intrathecal pain pump compound Hydromorphone (7.5 mg/mL) infusion at 8.59 mg/day (0.3578 mg/hr) out of a total reservoir volume of 40 mL  Pump filled monthly      levothyroxine (SYNTHROID) 150 MCG tablet Take 1 tablet (150 mcg total) by mouth before breakfast. 90 tablet 0    menthol (BIOFREEZE, MENTHOL,) 10 % Crea Apply topically as needed.      nitroGLYCERIN (NITROSTAT) 0.4 MG SL tablet Place 0.4 mg under the tongue every 5 (five) minutes as needed for Chest pain.      pantoprazole (PROTONIX) 40 MG tablet Take 1 tablet (40 mg total) by mouth once daily. 90 tablet 0    QUEtiapine (SEROQUEL) 50 MG tablet Take 1 tablet (50 mg total) by mouth every evening. 60 tablet 0    senna (SENNA LAX) 8.6 mg tablet Take 2 tablets by mouth 2 (two) times daily.      tiotropium bromide (SPIRIVA RESPIMAT) 2.5 mcg/actuation inhaler Inhale 2 puffs into the lungs once daily. Controller. Please restart taking. 4 g 1    traMADoL (ULTRAM) 50 mg tablet Take 50 mg by mouth every 12 (twelve) hours as needed for Pain.      UNABLE TO FIND medication name:THC 30 mg oral as needed for breakthrough pain      aspirin (ECOTRIN) 81 MG EC tablet Take 1 tablet (81 mg total) by mouth once daily. (Patient not taking: Reported on 5/22/2024) 90 tablet 3     No current facility-administered medications for this visit.        The patient's relevant past medical, surgical, and social history was reviewed in Epic.       Objective:      VITAL SIGNS: Ht 5' 5" (1.651 m)   Wt 93.9 kg (207 lb 0.2 oz)   LMP  (LMP Unknown)   BMI 34.45 kg/m²     General    Nursing note and vitals reviewed.  Constitutional: She is oriented to person, place, and time. She appears well-developed and well-nourished.   Neurological: She is alert and oriented to person, place, and time.         Right Shoulder Exam     Tenderness   Right shoulder tenderness location: diffuse shoulder tenderness.    Tests & Signs   Drop arm: positive    Other   Sensation: normal    Comments:  " Unable to actively move shoulder without pain.  Able to passively move shoulder minimally.      Vascular Exam     Right Pulses      Radial:                    2+             Assessment:       1. Traumatic complete tear of right rotator cuff, initial encounter    2. Acute pain of right shoulder    3. Arthritis of right glenohumeral joint          Plan:         Tasha was seen today for pain.    Diagnoses and all orders for this visit:    Traumatic complete tear of right rotator cuff, initial encounter  -     Ambulatory referral/consult to Home Health; Future  -     Ambulatory referral/consult to Physical/Occupational Therapy; Future  -     Large Joint Aspiration/Injection: R subacromial bursa    Acute pain of right shoulder  -     Ambulatory referral/consult to Orthopedics  -     Ambulatory referral/consult to Home Health; Future  -     Ambulatory referral/consult to Physical/Occupational Therapy; Future  -     X-Ray Shoulder Trauma 3 view Right; Future    Arthritis of right glenohumeral joint    I believe she has sustained a complete right rotator cuff tear. Xray reveals arthritis. She is unable to undergo MRI scan. Do not believe she is a good surgical candidate either. I would like her to try to undergo either home health PT or outpatient PT for her shoulder. (Daughter states they have been unable to get into home health due to her insurance). Right shoulder subacromial CS injection given today for pain. Continue current pain medication regimen. If she does not notice any improvement after therapy, will refer to shoulder specialist.     Diagnoses and plan discussed with the patient, as well as the expected course and duration of his symptoms.  All questions and concerns were addressed prior to the end of the visit.   Instructed patient to call office if they have any future questions/concerns or to schedule apt. Patient will return to see me if symptoms worsen or fail to improve    Note dictated with voice  recognition software, please excuse any grammatical errors.        Rebecca Xiong PA-C   06/21/2024

## 2024-06-22 LAB — BACTERIA UR CULT: ABNORMAL

## 2024-06-24 ENCOUNTER — TELEPHONE (OUTPATIENT)
Dept: UROLOGY | Facility: CLINIC | Age: 69
End: 2024-06-24
Payer: MEDICARE

## 2024-06-24 ENCOUNTER — PATIENT MESSAGE (OUTPATIENT)
Dept: UROLOGY | Facility: CLINIC | Age: 69
End: 2024-06-24
Payer: MEDICARE

## 2024-06-24 DIAGNOSIS — E78.2 MIXED HYPERLIPIDEMIA: ICD-10-CM

## 2024-06-24 RX ORDER — ATORVASTATIN CALCIUM 80 MG/1
80 TABLET, FILM COATED ORAL
Qty: 90 TABLET | Refills: 3 | Status: SHIPPED | OUTPATIENT
Start: 2024-06-24

## 2024-06-24 NOTE — TELEPHONE ENCOUNTER
Good Morning. Your surgery will start at 8:15 am, but your arrival time is 6:45 am.   It will be on the first floor [facility] behind the [facility].  You will need for someone to drive you home because you will be under anaesthesia.  No eating or drinking after Midnight. Take a shower the night before and the morning of with a anti-bacterial soap, ( Dial Soap/ Hibiclens) Do not apply and deodorant, perfume, powder or body lotions the morning of surgery. Wear comfortable clothing, like loose fitting pants and a button down shirt. Leave all jewelry and valuables at home.   If you have any questions don't hesitate to call me. Shireen [phone]

## 2024-06-24 NOTE — PROGRESS NOTES
Subjective:      Patient ID: Tasha Hawley is a 68 y.o. female.    Chief Complaint:  Adrenal Insufficiency and Hypothyroidism    Last visit was with Dr. Hickey on 06/12/2023.     Patient is on a low salt diet, but she is concerned because excessive fluid and weight gain. History significant for diastolic heart failure.     History of Present Illness  Ms. Hawley presents for follow-up for possible adrenal insufficiency. Has significant PMH including chronic pain with long-term opioid use, recurrent UTIs, lymphedema, debility, chronic diastolic HF, neurogenic bladder w/ suprapubic catheter, depression, and anxiety.    Was first suspected to have possible adrenal insufficiency during hospital stay in 1/2023. She was started on hydrocortison and fludrocortisone (see ACTH stimulation test below):    ACTH stimulation test:   Latest Reference Range & Units 01/16/23 07:57 01/16/23 08:35 01/16/23 09:10 01/17/23 07:48   ALDOSTERONE ng/dL    6.9   Cortisol ug/dL 8.60 16.40 17.20       Latest Reference Range & Units 01/17/23 05:59   Albumin 3.5 - 5.2 g/dL 2.5 (L)     Since the last visit, she was hospitalized for bilateral cellulitis of the lower legs 05/16/2024. During her hospitalization, she had an ACTH stimulation test that that was fairly insignificant, but ACTH was drawn after the injection instead of before. She was discharged with Hydrocortisone 10 mg in the morning and 5 mg in the early afternoon. It was recommended she complete a repeat ACTH stimulation test outpatient.     Latest Reference Range & Units 05/24/24 12:49 05/24/24 14:12 05/24/24 14:52   Cortisol ug/dL 9.90 11.60 13.80       Since discharge, she has been taking Hydrocortisone 10 mg in the morning and 5 mg in the evening. Also taking Fludrocortisone 0.1 mg in the morning. Both were last taken 06/29/2024 for today's ACTH stimulation test.     Latest Reference Range & Units 07/01/24 09:03 07/01/24 09:37 07/01/24 10:07   Cortisol ug/dL <1.00 4.50 6.60      Her daughter reported that patient has had hypotension for many years and that it did not appreciably improve on varying doses of glucocorticoid and mineralocorticoid replacement.     She has had no chronic exposure to IM or oral glucocorticoids. No recent IM or intrarticular steroid injections.     Primary adrenal insufficiency symptoms:  Positive fatigue  Positive weakness  Positive loss of appetite  Denies weight loss- weight gain secondary to heart failure   Denies hyperpigmentation on palms of hands and soles of feet  Positive hypotensive symptoms- lightheadedness when standing  Positive muscle and joint pain   Positive nausea and vomiting  Positive salt cravings- low salt diet secondary to heart failure  Complaining of weight gain, sweating, disoriented     Secondary or tertiary adrenal insufficiency symptoms:  Denies hypoglycemia symptoms- sweating, anxiety, nausea, & palpitations   Positive headaches- history of migraine headaches  Denies visual field loss     Also with hypothyroidism:  Currently taking Levothyroxine 150 mcg once daily.    Taking appropriately in the morning before food and other medications.  Occasional missed doses.    Lab Results   Component Value Date    TSH 5.888 (H) 07/01/2024     Review of Systems   Constitutional:  Negative for chills and fever.   Gastrointestinal:  Negative for nausea.       Objective:   Physical Exam  Vitals and nursing note reviewed.   Constitutional:       Appearance: Normal appearance.   Eyes:      Conjunctiva/sclera: Conjunctivae normal.   Cardiovascular:      Rate and Rhythm: Normal rate.   Pulmonary:      Effort: Pulmonary effort is normal.   Musculoskeletal:      Right lower leg: Edema present.      Left lower leg: Edema present.   Neurological:      Mental Status: She is alert.   Psychiatric:         Mood and Affect: Mood is anxious.         Behavior: Behavior normal.         Thought Content: Thought content normal.       BP Readings from Last 3  Encounters:   07/01/24 132/74   06/25/24 138/80   06/25/24 130/60     Wt Readings from Last 1 Encounters:   07/01/24 1049 93.9 kg (207 lb 0.2 oz)       Body mass index is 34.45 kg/m².    Lab Review:   Lab Results   Component Value Date    HGBA1C 5.3 05/03/2024     Lab Results   Component Value Date    CHOL 122 05/22/2023    HDL 47 05/22/2023    LDLCALC 60.4 (L) 05/22/2023    TRIG 73 05/22/2023    CHOLHDL 38.5 05/22/2023     Lab Results   Component Value Date     (L) 06/20/2024    K 5.0 06/20/2024    CL 95 06/20/2024    CO2 33 (H) 06/20/2024    GLU 96 06/20/2024    BUN 23 06/20/2024    CREATININE 1.3 06/20/2024    CALCIUM 9.9 06/20/2024    PROT 8.1 06/20/2024    ALBUMIN 4.1 06/20/2024    BILITOT 0.5 06/20/2024    ALKPHOS 156 (H) 06/20/2024    AST 16 06/20/2024    ALT 14 06/20/2024    ANIONGAP 7 (L) 06/20/2024    ESTGFRAFRICA >60 07/31/2022    EGFRNONAA >60 07/31/2022    TSH 5.888 (H) 07/01/2024         Assessment and Plan     Adrenal insufficiency  Suspected adrenal insufficiency since 01/2023 and started on Hydrocortisone and Fludrocortisone  Low suspicion on repeat ACTH stimulation tests in 05/2024 but not determinate due to lab drawing incorrectly  Insufficient rise in cortisol on today's stimulation test   Continue Hydrocortisone 10 mg in the morning and 5 mg in the evening  Discontinue Fludrocortisone  Patient instructed to monitor blood pressure regularly    Hypothyroid  --TSH closer to goal on today's labs but still elevated  --Likely that patient misses doses of Levothyroxine  --TSH levels will likely be at goal if patient is compliant with daily thyroid hormone replacement  --Continue Levothyroxine 150 mcg once daily in the morning  -- Take LT4 on an empty stomach with water and to wait 30-45 minutes before eating or taking other medications   -- Calcium and iron need to be  from thyroid hormone replacement by 4 or > hours.  -- Instructed patient to take 2 tablets of Levothyroxine the next day  if she misses a dose  -- Avoid exogenous hyperthyroidism as this can accelerate bone loss and increase risk of CV complications.  -- TSH prior to follow-up    Chronic diastolic heart failure  Patient with chronic heart failure on low salt diet  She complains of increased lower extremity edema and weight gain  Last visit with Dr. Lozano 10/31/2023  Referral to Cardiology    Follow up in about 6 months (around 1/1/2025).    Visit today included increased complexity associated with the care of the problems addressed and managing the longitudinal care of the patient due to the serious and/or complex managed problems.    Siena Dyer PA-C      I have reviewed and concur with the PA's history, assessment, and plan.  I have personally interviewed the patient and all questions were answered.       Efe Hickey M.D. Staff Endocrinology

## 2024-06-24 NOTE — TELEPHONE ENCOUNTER
Care Due:                  Date            Visit Type   Department     Provider  --------------------------------------------------------------------------------                                SAME DAY -                              ESTABLISHED   [facility] INTERNAL  Last Visit: 05-      PATIENT      MEDICINE       Merly Garcia  Next Visit: None Scheduled  None         None Found                                                            Last  Test          Frequency    Reason                     Performed    Due Date  --------------------------------------------------------------------------------    Lipid Panel.  12 months..  atorvastatin.............  05- 05-    [facility] Embedded Care Due Messages. Reference number: [id number].   6/24/2024 10:19:37 AM CDT

## 2024-06-25 ENCOUNTER — TELEPHONE (OUTPATIENT)
Dept: UROLOGY | Facility: CLINIC | Age: 69
End: 2024-06-25
Payer: MEDICARE

## 2024-06-25 ENCOUNTER — OFFICE VISIT (OUTPATIENT)
Dept: INTERNAL MEDICINE | Facility: CLINIC | Age: 69
End: 2024-06-25
Payer: MEDICARE

## 2024-06-25 VITALS — OXYGEN SATURATION: 94 % | DIASTOLIC BLOOD PRESSURE: 80 MMHG | HEART RATE: 74 BPM | SYSTOLIC BLOOD PRESSURE: 138 MMHG

## 2024-06-25 DIAGNOSIS — N39.46 MIXED STRESS AND URGE URINARY INCONTINENCE: Primary | ICD-10-CM

## 2024-06-25 DIAGNOSIS — R53.81 PHYSICAL DECONDITIONING: ICD-10-CM

## 2024-06-25 DIAGNOSIS — G47.00 INSOMNIA, UNSPECIFIED TYPE: ICD-10-CM

## 2024-06-25 DIAGNOSIS — I89.0 LYMPHEDEMA OF BOTH LOWER EXTREMITIES: Primary | ICD-10-CM

## 2024-06-25 DIAGNOSIS — L03.115 BILATERAL CELLULITIS OF LOWER LEG: ICD-10-CM

## 2024-06-25 DIAGNOSIS — Z93.59 SUPRAPUBIC CATHETER: Chronic | ICD-10-CM

## 2024-06-25 DIAGNOSIS — N32.81 DETRUSOR OVERACTIVITY: ICD-10-CM

## 2024-06-25 DIAGNOSIS — I89.0 LYMPHEDEMA: ICD-10-CM

## 2024-06-25 DIAGNOSIS — G89.4 CHRONIC PAIN SYNDROME: Chronic | ICD-10-CM

## 2024-06-25 DIAGNOSIS — R29.6 FREQUENT FALLS: Chronic | ICD-10-CM

## 2024-06-25 DIAGNOSIS — N31.9 NEUROGENIC BLADDER: Chronic | ICD-10-CM

## 2024-06-25 DIAGNOSIS — F11.20 NARCOTIC DEPENDENCY, CONTINUOUS: Chronic | ICD-10-CM

## 2024-06-25 DIAGNOSIS — L03.116 BILATERAL CELLULITIS OF LOWER LEG: ICD-10-CM

## 2024-06-25 PROCEDURE — 3075F SYST BP GE 130 - 139MM HG: CPT | Mod: CPTII,S$GLB,, | Performed by: INTERNAL MEDICINE

## 2024-06-25 PROCEDURE — 3079F DIAST BP 80-89 MM HG: CPT | Mod: CPTII,S$GLB,, | Performed by: INTERNAL MEDICINE

## 2024-06-25 PROCEDURE — 99214 OFFICE O/P EST MOD 30 MIN: CPT | Mod: S$GLB,,, | Performed by: INTERNAL MEDICINE

## 2024-06-25 PROCEDURE — 3288F FALL RISK ASSESSMENT DOCD: CPT | Mod: CPTII,S$GLB,, | Performed by: INTERNAL MEDICINE

## 2024-06-25 PROCEDURE — 99999 PR PBB SHADOW E&M-EST. PATIENT-LVL IV: CPT | Mod: PBBFAC,,, | Performed by: INTERNAL MEDICINE

## 2024-06-25 PROCEDURE — 1111F DSCHRG MED/CURRENT MED MERGE: CPT | Mod: CPTII,S$GLB,, | Performed by: INTERNAL MEDICINE

## 2024-06-25 PROCEDURE — 3044F HG A1C LEVEL LT 7.0%: CPT | Mod: CPTII,S$GLB,, | Performed by: INTERNAL MEDICINE

## 2024-06-25 PROCEDURE — 1125F AMNT PAIN NOTED PAIN PRSNT: CPT | Mod: CPTII,S$GLB,, | Performed by: INTERNAL MEDICINE

## 2024-06-25 PROCEDURE — 1160F RVW MEDS BY RX/DR IN RCRD: CPT | Mod: CPTII,S$GLB,, | Performed by: INTERNAL MEDICINE

## 2024-06-25 PROCEDURE — 1101F PT FALLS ASSESS-DOCD LE1/YR: CPT | Mod: CPTII,S$GLB,, | Performed by: INTERNAL MEDICINE

## 2024-06-25 PROCEDURE — 1159F MED LIST DOCD IN RCRD: CPT | Mod: CPTII,S$GLB,, | Performed by: INTERNAL MEDICINE

## 2024-06-25 NOTE — TELEPHONE ENCOUNTER
I called the patient to let her know that her surgery that was suppose to be today was rescheduled for this Friday. 06-. She verbalized understanding and thanked me.

## 2024-06-25 NOTE — PROGRESS NOTES
Subjective:       Patient ID: Tasha Hawley is a 68 y.o. female.    Chief Complaint:   Follow-up    HPI - she had a urologic procedure scheduled for today, but this was canceled because of bacteriuria.  She feels better than she has in several months.  She is living at home with her elderly .  She continues to be resistant to placement in a longer term facility for care.  Almost every home health service in this area has declined to see her again.  I attempted to reconcile medications, but she has not certain; agreed to bring all meds in to the next visit.  She continues to complain of insomnia.  States that she has not slept in a week, which I question.  She is not a smoker    PMH:  Neurogenic bladder, with recurrent UTI's. Followed by urogyn (Milena).  Suprapubic catheter  CAD, with ACS in Jan 2016 - subtot mid LAD lesion without PCI; ischemic CM with EF 40% and chronic LE edema. Followed by [facility] cardiology  Chronic insomnia  Lymphedema bilateral LE's  Intermittent nausea  Chronic low back pain with narcotic use.  Indwelling narcotic pump  History CVA with dysarthria and swallowing difficulty  Hypothyroidism  CECI, on CPAP  Anxiety and Depression  Chronic constipation, d/t ongoing narcotic use.  Dependent on home oxygen     Meds: Reviewed and reconciled in EPIC with patient during visit today.     Review of Systems   Constitutional:  Negative for fever.   HENT:  Negative for congestion.    Respiratory:  Negative for shortness of breath.    Cardiovascular:  Positive for leg swelling. Negative for chest pain.   Gastrointestinal:  Negative for abdominal pain.   Genitourinary:  Negative for vaginal discharge.   Musculoskeletal:  Positive for gait problem.   Skin:  Negative for rash.   Neurological:  Negative for headaches.   Psychiatric/Behavioral:  Positive for sleep disturbance.        Objective:      Physical Exam  HENT:      Head: Normocephalic and atraumatic.   Pulmonary:      Effort: Pulmonary  effort is normal.   Musculoskeletal:      Right lower leg: Edema present.      Left lower leg: Edema present.   Neurological:      Mental Status: She is alert. Mental status is at baseline.         Assessment:       1. Lymphedema of both lower extremities    2. Physical deconditioning    3. Insomnia, unspecified type    4. Chronic pain syndrome    5. Narcotic dependency, continuous    6. Neurogenic bladder    7. Suprapubic catheter    8. Frequent falls        Plan:       Tasha was seen today for follow-up.    Diagnoses and all orders for this visit:    Lymphedema of both lower extremities - this is remarkable.  She really needs to be in a facility where she can get regular wrapping of her legs to address this.  She will not be able to ambulate or improve her deconditioning until this happens.  She is very resistant.  We will continue to advice    Physical deconditioning - wheelchair-bound.  She does not walk much.  She states no falls since last hospitalization.  Continue to monitor    Insomnia, unspecified type - chronic problem discussed management.  Use the meds you have at home.    Chronic pain syndrome - stable without pain today.  She had multiple made medications and an indwelling pump    Narcotic dependency, continuous - stable, as above.  Monitor    Neurogenic bladder    Suprapubic catheter    Frequent falls - would do better if placed in a long-term facility.  She continues to be resistant.  Monitor    RTC p.r.n., or in 3 months with all pill bottles    PAPO Hodges MD MPH  Staff Internist

## 2024-06-26 ENCOUNTER — TELEPHONE (OUTPATIENT)
Dept: INTERNAL MEDICINE | Facility: CLINIC | Age: 69
End: 2024-06-26
Payer: MEDICARE

## 2024-06-26 NOTE — TELEPHONE ENCOUNTER
----- Message from Roland Doran MA sent at 6/26/2024  4:01 PM CDT -----  Contact: [phone]    ----- Message -----  From: Charla Clark  Sent: 6/26/2024   3:54 PM CDT  To: [facility] Toure Staff    [email or web address]     Patient is calling for Medical Advice regarding: Pt said she needs something for not sleeping and see him yesterday and told him about it      How long has patient had these symptoms:    Pharmacy name and phone#:  [facility] - [address]  [address]  [place] [address]  Phone: [phone] Fax: [phone]        Patient wants a call back or thru [facility]:call back     Comments:  Please call pt back   Please advise patient replies from provider may take up to 48 hours.

## 2024-06-26 NOTE — TELEPHONE ENCOUNTER
Please call patient or her .  She has multiple medications for sleep available at home.  She needs to use what she has.

## 2024-06-27 ENCOUNTER — TELEPHONE (OUTPATIENT)
Dept: UROLOGY | Facility: CLINIC | Age: 69
End: 2024-06-27
Payer: MEDICARE

## 2024-06-27 ENCOUNTER — PATIENT MESSAGE (OUTPATIENT)
Dept: UROLOGY | Facility: CLINIC | Age: 69
End: 2024-06-27
Payer: MEDICARE

## 2024-06-27 NOTE — TELEPHONE ENCOUNTER
Good Morning. Your surgery will start at 2:15 pm, but your arrival time is 1:15 pm.   It will be on the first floor [facility] behind the [facility].  You will need for someone to drive you home because you will be under anaesthesia.  No eating or drinking after Midnight. Take a shower the night before and the morning of with a anti-bacterial soap, ( Dial Soap/ Hibiclens) Do not apply and deodorant, perfume, powder or body lotions the morning of surgery. Wear comfortable clothing, like loose fitting pants and a button down shirt. Leave all jewelry and valuables at home.   If you have any questions don't hesitate to call me. Shireen [phone]

## 2024-06-28 ENCOUNTER — OUTPATIENT CASE MANAGEMENT (OUTPATIENT)
Dept: ADMINISTRATIVE | Facility: OTHER | Age: 69
End: 2024-06-28
Payer: MEDICARE

## 2024-06-28 NOTE — PROGRESS NOTES
06/28/2024  Patient has surgery that was rescheduled for today. SW will follow up with patient next week.

## 2024-07-01 ENCOUNTER — OFFICE VISIT (OUTPATIENT)
Dept: ENDOCRINOLOGY | Facility: CLINIC | Age: 69
End: 2024-07-01
Payer: MEDICARE

## 2024-07-01 ENCOUNTER — TELEPHONE (OUTPATIENT)
Dept: GASTROENTEROLOGY | Facility: CLINIC | Age: 69
End: 2024-07-01
Payer: MEDICARE

## 2024-07-01 ENCOUNTER — CLINICAL SUPPORT (OUTPATIENT)
Dept: ENDOCRINOLOGY | Facility: CLINIC | Age: 69
End: 2024-07-01
Payer: MEDICARE

## 2024-07-01 VITALS
HEART RATE: 76 BPM | DIASTOLIC BLOOD PRESSURE: 74 MMHG | WEIGHT: 207 LBS | HEIGHT: 65 IN | SYSTOLIC BLOOD PRESSURE: 132 MMHG | BODY MASS INDEX: 34.49 KG/M2

## 2024-07-01 DIAGNOSIS — I50.32 CHRONIC DIASTOLIC HEART FAILURE: ICD-10-CM

## 2024-07-01 DIAGNOSIS — E03.9 HYPOTHYROIDISM, UNSPECIFIED TYPE: ICD-10-CM

## 2024-07-01 DIAGNOSIS — E27.40 ADRENAL INSUFFICIENCY: ICD-10-CM

## 2024-07-01 DIAGNOSIS — E03.9 HYPOTHYROIDISM (ACQUIRED): ICD-10-CM

## 2024-07-01 DIAGNOSIS — I50.9 HEART FAILURE, UNSPECIFIED HF CHRONICITY, UNSPECIFIED HEART FAILURE TYPE: Primary | ICD-10-CM

## 2024-07-01 LAB
CORTIS SERPL-MCNC: 4.5 UG/DL
CORTIS SERPL-MCNC: 6.6 UG/DL
CORTIS SERPL-MCNC: <1 UG/DL
T4 FREE SERPL-MCNC: 0.99 NG/DL (ref 0.71–1.51)
TSH SERPL DL<=0.005 MIU/L-ACNC: 5.89 UIU/ML (ref 0.4–4)

## 2024-07-01 PROCEDURE — 1126F AMNT PAIN NOTED NONE PRSNT: CPT | Mod: CPTII,S$GLB,, | Performed by: INTERNAL MEDICINE

## 2024-07-01 PROCEDURE — 3008F BODY MASS INDEX DOCD: CPT | Mod: CPTII,S$GLB,, | Performed by: INTERNAL MEDICINE

## 2024-07-01 PROCEDURE — G2211 COMPLEX E/M VISIT ADD ON: HCPCS | Mod: S$GLB,,, | Performed by: INTERNAL MEDICINE

## 2024-07-01 PROCEDURE — 1160F RVW MEDS BY RX/DR IN RCRD: CPT | Mod: CPTII,S$GLB,, | Performed by: INTERNAL MEDICINE

## 2024-07-01 PROCEDURE — 82533 TOTAL CORTISOL: CPT | Performed by: STUDENT IN AN ORGANIZED HEALTH CARE EDUCATION/TRAINING PROGRAM

## 2024-07-01 PROCEDURE — 99999 PR PBB SHADOW E&M-EST. PATIENT-LVL V: CPT | Mod: PBBFAC,,, | Performed by: INTERNAL MEDICINE

## 2024-07-01 PROCEDURE — 1101F PT FALLS ASSESS-DOCD LE1/YR: CPT | Mod: CPTII,S$GLB,, | Performed by: INTERNAL MEDICINE

## 2024-07-01 PROCEDURE — 3075F SYST BP GE 130 - 139MM HG: CPT | Mod: CPTII,S$GLB,, | Performed by: INTERNAL MEDICINE

## 2024-07-01 PROCEDURE — 3044F HG A1C LEVEL LT 7.0%: CPT | Mod: CPTII,S$GLB,, | Performed by: INTERNAL MEDICINE

## 2024-07-01 PROCEDURE — 1159F MED LIST DOCD IN RCRD: CPT | Mod: CPTII,S$GLB,, | Performed by: INTERNAL MEDICINE

## 2024-07-01 PROCEDURE — 84443 ASSAY THYROID STIM HORMONE: CPT | Performed by: STUDENT IN AN ORGANIZED HEALTH CARE EDUCATION/TRAINING PROGRAM

## 2024-07-01 PROCEDURE — 99214 OFFICE O/P EST MOD 30 MIN: CPT | Mod: S$GLB,,, | Performed by: INTERNAL MEDICINE

## 2024-07-01 PROCEDURE — 84439 ASSAY OF FREE THYROXINE: CPT | Performed by: STUDENT IN AN ORGANIZED HEALTH CARE EDUCATION/TRAINING PROGRAM

## 2024-07-01 PROCEDURE — 3288F FALL RISK ASSESSMENT DOCD: CPT | Mod: CPTII,S$GLB,, | Performed by: INTERNAL MEDICINE

## 2024-07-01 PROCEDURE — 3078F DIAST BP <80 MM HG: CPT | Mod: CPTII,S$GLB,, | Performed by: INTERNAL MEDICINE

## 2024-07-01 PROCEDURE — 82024 ASSAY OF ACTH: CPT | Performed by: STUDENT IN AN ORGANIZED HEALTH CARE EDUCATION/TRAINING PROGRAM

## 2024-07-01 RX ORDER — LEVOTHYROXINE SODIUM 150 UG/1
150 TABLET ORAL
Qty: 30 TABLET | Refills: 11 | Status: SHIPPED | OUTPATIENT
Start: 2024-07-01 | End: 2025-07-01

## 2024-07-01 RX ORDER — HYDROCORTISONE 5 MG/1
5 TABLET ORAL
Qty: 30 TABLET | Refills: 5 | Status: SHIPPED | OUTPATIENT
Start: 2024-07-01 | End: 2024-12-28

## 2024-07-01 RX ORDER — HYDROCORTISONE 10 MG/1
10 TABLET ORAL DAILY
Qty: 30 TABLET | Refills: 5 | Status: SHIPPED | OUTPATIENT
Start: 2024-07-01 | End: 2024-12-28

## 2024-07-01 NOTE — ASSESSMENT & PLAN NOTE
Suspected adrenal insufficiency since 01/2023 and started on Hydrocortisone and Fludrocortisone  Low suspicion on repeat ACTH stimulation tests in 05/2024 but not determinate due to lab drawing incorrectly  Insufficient rise in cortisol on today's stimulation test   Continue Hydrocortisone 10 mg in the morning and 5 mg in the evening  Discontinue Fludrocortisone  Patient instructed to monitor blood pressure regularly

## 2024-07-01 NOTE — ASSESSMENT & PLAN NOTE
--TSH closer to goal on today's labs but still elevated  --Likely that patient misses doses of Levothyroxine  --TSH levels will likely be at goal if patient is compliant with daily thyroid hormone replacement  --Continue Levothyroxine 150 mcg once daily in the morning  -- Take LT4 on an empty stomach with water and to wait 30-45 minutes before eating or taking other medications   -- Calcium and iron need to be  from thyroid hormone replacement by 4 or > hours.  -- Instructed patient to take 2 tablets of Levothyroxine the next day if she misses a dose  -- Avoid exogenous hyperthyroidism as this can accelerate bone loss and increase risk of CV complications.  -- TSH prior to follow-up

## 2024-07-01 NOTE — TELEPHONE ENCOUNTER
She referred to her Dr Cortés. She is having trouble swallowing. She is asking to follow up with you. But should she follow up with Dr Cortés?

## 2024-07-01 NOTE — PROGRESS NOTES
Patient arrives in clinic for ACTH per Dr. Andrade  IV 20 gauge start PICC team  Baseline ACTH (chilled), Cortisol and TSH drawn and walked to lab.  Cosyntropin IV pushed by  STANLEY Edwards  30 minute Cortisol drawn and walked to lab  60-minute Cortisol drawn and walked to lab  Iv d'cd patient tolerated well, release with family member.

## 2024-07-01 NOTE — TELEPHONE ENCOUNTER
----- Message from Tegan Hope sent at 7/1/2024  8:18 AM CDT -----  Regarding: Appt Access  Contact: [phone]  Patient is calling stating that she wants to be scheduled w provider for trouble with swallowing food per pt. Please give pt a call to get her scheduled.

## 2024-07-01 NOTE — PATIENT INSTRUCTIONS
Continue Hydrocortisone 10 mg in the morning and 5 mg in the evening  Stop Fludrocortisone  Check your blood pressure regularly  Set up an appointment to see a cardiologist    Continue Levothyroxine 150 mcg once daily in the morning   - Take LT4 on an empty stomach with water and to wait 30-45 minutes before eating or taking other medications    - Calcium and iron need to be  from thyroid hormone replacement by 4 or > hours.   - If you forget to take the Levothyroxine, you can take 2 tablets the next day and resume taking 1 tablet once daily

## 2024-07-01 NOTE — ASSESSMENT & PLAN NOTE
Patient with chronic heart failure on low salt diet  She complains of increased lower extremity edema and weight gain  Last visit with Dr. Lozano 10/31/2023  Referral to Cardiology

## 2024-07-02 ENCOUNTER — OUTPATIENT CASE MANAGEMENT (OUTPATIENT)
Dept: ADMINISTRATIVE | Facility: OTHER | Age: 69
End: 2024-07-02
Payer: MEDICARE

## 2024-07-02 ENCOUNTER — TELEPHONE (OUTPATIENT)
Dept: UROLOGY | Facility: CLINIC | Age: 69
End: 2024-07-02
Payer: MEDICARE

## 2024-07-02 ENCOUNTER — PATIENT MESSAGE (OUTPATIENT)
Dept: UROLOGY | Facility: CLINIC | Age: 69
End: 2024-07-02
Payer: MEDICARE

## 2024-07-02 DIAGNOSIS — N39.46 MIXED STRESS AND URGE URINARY INCONTINENCE: ICD-10-CM

## 2024-07-02 DIAGNOSIS — N32.81 DETRUSOR OVERACTIVITY: Primary | ICD-10-CM

## 2024-07-02 LAB — ACTH PLAS-MCNC: 6 PG/ML (ref 0–46)

## 2024-07-02 NOTE — PROGRESS NOTES
Outpatient Care Management   - Care Plan Follow Up    Patient: Tasha Hawley  MRN:  [id number]  Date of Service:  7/2/2024  Completed by:  Tatiana Guzman LCSW  Referral Date: 05/24/2024    Reason for Visit   Patient presents with    OPCM SW Follow Up Call     07/02/2024       Brief Summary: Spoke with patient who reports she is doing much better today. Patient reports she has been taking her dog for short walks in/around the house recently. Patient feels she does not need LTPCS/CCW at this time. Patient reports she is aware of her daughter helping her with her insurance plan, and is now wanting to talk to [facility] about switching to their insurance. Patient requested the ph# for [facility] customer service. Patient reports she will call [facility] with her daughter to discuss their coverage/plan options. SW will follow up with patient in 3 weeks.    Complex Care Plan     Care plan was discussed and completed today with input from patient and/or caregiver.    Patient Instructions     No follow-ups on file.

## 2024-07-02 NOTE — TELEPHONE ENCOUNTER
I called the patient to get her rescheduled for surgery with Dr Mayo. No answer. I left a VM and a Portal message offering her 08-. I left detailed information and my number so the patient can let me know if this date works for her.

## 2024-07-02 NOTE — PROGRESS NOTES
Outpatient Care Management  Plan of Care Follow Up Visit    Patient: Tasha Hawley  MRN: [id number]  Date of Service: 07/02/2024  Completed by: Caitlin Ordaz RN  Referral Date: 05/24/2024    Reason for Visit   Patient presents with    OPCM RN Follow Up Call    OPCM Chart Review       Brief Summary:   OPCM RN follow-up call completed.   Continue education on Wound/Pain.   Next Steps: Patient is in agreement to follow-up call on or around 7/10/2024.

## 2024-07-10 ENCOUNTER — OUTPATIENT CASE MANAGEMENT (OUTPATIENT)
Dept: ADMINISTRATIVE | Facility: OTHER | Age: 69
End: 2024-07-10
Payer: MEDICARE

## 2024-07-10 ENCOUNTER — PATIENT MESSAGE (OUTPATIENT)
Dept: ADMINISTRATIVE | Facility: OTHER | Age: 69
End: 2024-07-10
Payer: MEDICARE

## 2024-07-10 NOTE — PROGRESS NOTES
Outpatient Care Management  Plan of Care Follow Up Visit    Patient: Tasha Hawley  MRN: [id number]  Date of Service: 07/10/2024  Completed by: Caitlin Ordaz RN  Referral Date: 05/24/2024    Reason for Visit   Patient presents with    OPCM RN Follow Up Call    OPCM Chart Review       Brief Summary:   OPCM RN follow-up call completed.   Continue education on HF, Pain/Fall prevention.  Cardio appt for CHF scheduled for 8/7/2024.   Next Steps: Patient is in agreement to follow-up call on or around 7/24/2024.

## 2024-07-11 ENCOUNTER — TELEPHONE (OUTPATIENT)
Dept: UROLOGY | Facility: CLINIC | Age: 69
End: 2024-07-11
Payer: MEDICARE

## 2024-07-15 ENCOUNTER — OUTPATIENT CASE MANAGEMENT (OUTPATIENT)
Dept: ADMINISTRATIVE | Facility: OTHER | Age: 69
End: 2024-07-15
Payer: MEDICARE

## 2024-07-18 ENCOUNTER — HOSPITAL ENCOUNTER (EMERGENCY)
Facility: HOSPITAL | Age: 69
Discharge: HOME | End: 2024-07-18
Attending: EMERGENCY MEDICINE
Payer: MEDICARE

## 2024-07-18 VITALS
DIASTOLIC BLOOD PRESSURE: 57 MMHG | HEART RATE: 95 BPM | WEIGHT: 239 LBS | HEIGHT: 64 IN | BODY MASS INDEX: 40.8 KG/M2 | TEMPERATURE: 98 F | RESPIRATION RATE: 20 BRPM | OXYGEN SATURATION: 97 % | SYSTOLIC BLOOD PRESSURE: 104 MMHG

## 2024-07-18 DIAGNOSIS — I89.0 CHRONIC ACQUIRED LYMPHEDEMA: Primary | ICD-10-CM

## 2024-07-18 DIAGNOSIS — M79.671 FOOT PAIN, RIGHT: ICD-10-CM

## 2024-07-18 DIAGNOSIS — M79.89 LEG SWELLING: ICD-10-CM

## 2024-07-18 LAB
ALBUMIN SERPL BCP-MCNC: 3.3 G/DL (ref 3.5–5.2)
ALP SERPL-CCNC: 134 U/L (ref 55–135)
ALT SERPL W/O P-5'-P-CCNC: 7 U/L (ref 10–44)
ANION GAP SERPL CALC-SCNC: 10 MMOL/L (ref 8–16)
AST SERPL-CCNC: 11 U/L (ref 10–40)
BASOPHILS # BLD AUTO: 0.02 K/UL (ref 0–0.2)
BASOPHILS NFR BLD: 0.4 % (ref 0–1.9)
BILIRUB SERPL-MCNC: 0.5 MG/DL (ref 0.1–1)
BNP SERPL-MCNC: 32 PG/ML (ref 0–99)
BUN SERPL-MCNC: 14 MG/DL (ref 8–23)
CALCIUM SERPL-MCNC: 9.2 MG/DL (ref 8.7–10.5)
CHLORIDE SERPL-SCNC: 100 MMOL/L (ref 95–110)
CO2 SERPL-SCNC: 27 MMOL/L (ref 23–29)
CREAT SERPL-MCNC: 1 MG/DL (ref 0.5–1.4)
DIFFERENTIAL METHOD BLD: ABNORMAL
EOSINOPHIL # BLD AUTO: 0.3 K/UL (ref 0–0.5)
EOSINOPHIL NFR BLD: 4.9 % (ref 0–8)
ERYTHROCYTE [DISTWIDTH] IN BLOOD BY AUTOMATED COUNT: 16.2 % (ref 11.5–14.5)
EST. GFR  (NO RACE VARIABLE): >60 ML/MIN/1.73 M^2
GLUCOSE SERPL-MCNC: 106 MG/DL (ref 70–110)
HCT VFR BLD AUTO: 33.7 % (ref 37–48.5)
HGB BLD-MCNC: 10.7 G/DL (ref 12–16)
IMM GRANULOCYTES # BLD AUTO: 0.01 K/UL (ref 0–0.04)
IMM GRANULOCYTES NFR BLD AUTO: 0.2 % (ref 0–0.5)
LYMPHOCYTES # BLD AUTO: 1.4 K/UL (ref 1–4.8)
LYMPHOCYTES NFR BLD: 24.9 % (ref 18–48)
MCH RBC QN AUTO: 27.7 PG (ref 27–31)
MCHC RBC AUTO-ENTMCNC: 31.8 G/DL (ref 32–36)
MCV RBC AUTO: 87 FL (ref 82–98)
MONOCYTES # BLD AUTO: 0.7 K/UL (ref 0.3–1)
MONOCYTES NFR BLD: 12.1 % (ref 4–15)
NEUTROPHILS # BLD AUTO: 3.3 K/UL (ref 1.8–7.7)
NEUTROPHILS NFR BLD: 57.5 % (ref 38–73)
NRBC BLD-RTO: 0 /100 WBC
PLATELET # BLD AUTO: 251 K/UL (ref 150–450)
PMV BLD AUTO: 9.3 FL (ref 9.2–12.9)
POTASSIUM SERPL-SCNC: 3.9 MMOL/L (ref 3.5–5.1)
PROT SERPL-MCNC: 7 G/DL (ref 6–8.4)
RBC # BLD AUTO: 3.86 M/UL (ref 4–5.4)
SODIUM SERPL-SCNC: 137 MMOL/L (ref 136–145)
TROPONIN I SERPL DL<=0.01 NG/ML-MCNC: <0.006 NG/ML (ref 0–0.03)
WBC # BLD AUTO: 5.71 K/UL (ref 3.9–12.7)

## 2024-07-18 PROCEDURE — 99285 EMERGENCY DEPT VISIT HI MDM: CPT | Mod: 25

## 2024-07-18 PROCEDURE — 63600175 PHARM REV CODE 636 W HCPCS: Performed by: STUDENT IN AN ORGANIZED HEALTH CARE EDUCATION/TRAINING PROGRAM

## 2024-07-18 PROCEDURE — 85025 COMPLETE CBC W/AUTO DIFF WBC: CPT | Performed by: EMERGENCY MEDICINE

## 2024-07-18 PROCEDURE — 84484 ASSAY OF TROPONIN QUANT: CPT | Performed by: EMERGENCY MEDICINE

## 2024-07-18 PROCEDURE — 96374 THER/PROPH/DIAG INJ IV PUSH: CPT

## 2024-07-18 PROCEDURE — 80053 COMPREHEN METABOLIC PANEL: CPT | Performed by: EMERGENCY MEDICINE

## 2024-07-18 PROCEDURE — 93010 ELECTROCARDIOGRAM REPORT: CPT | Mod: ,,, | Performed by: INTERNAL MEDICINE

## 2024-07-18 PROCEDURE — 93005 ELECTROCARDIOGRAM TRACING: CPT

## 2024-07-18 PROCEDURE — 96375 TX/PRO/DX INJ NEW DRUG ADDON: CPT

## 2024-07-18 PROCEDURE — 83880 ASSAY OF NATRIURETIC PEPTIDE: CPT | Performed by: EMERGENCY MEDICINE

## 2024-07-18 RX ORDER — FUROSEMIDE 40 MG/1
40 TABLET ORAL 2 TIMES DAILY
Qty: 6 TABLET | Refills: 0 | Status: SHIPPED | OUTPATIENT
Start: 2024-07-18 | End: 2024-07-21

## 2024-07-18 RX ORDER — HYDROMORPHONE HYDROCHLORIDE 1 MG/ML
1 INJECTION, SOLUTION INTRAMUSCULAR; INTRAVENOUS; SUBCUTANEOUS
Status: COMPLETED | OUTPATIENT
Start: 2024-07-18 | End: 2024-07-18

## 2024-07-18 RX ORDER — CYCLOBENZAPRINE HCL 10 MG
10 TABLET ORAL 3 TIMES DAILY PRN
Qty: 15 TABLET | Refills: 0 | Status: SHIPPED | OUTPATIENT
Start: 2024-07-18 | End: 2024-07-23

## 2024-07-18 RX ORDER — FUROSEMIDE 10 MG/ML
40 INJECTION INTRAMUSCULAR; INTRAVENOUS
Status: COMPLETED | OUTPATIENT
Start: 2024-07-18 | End: 2024-07-18

## 2024-07-18 RX ADMIN — FUROSEMIDE 40 MG: 10 INJECTION, SOLUTION INTRAVENOUS at 08:07

## 2024-07-18 RX ADMIN — HYDROMORPHONE HYDROCHLORIDE 1 MG: 1 INJECTION, SOLUTION INTRAMUSCULAR; INTRAVENOUS; SUBCUTANEOUS at 09:07

## 2024-07-18 NOTE — ED NOTES
Received into care a 69 y/o female with c/o bilateral eye redness and drainage for 2 days and bilateral lower extremity pain, swelling and redness for several weeks. Patient went to Podiatry clinic today and was told to come to the ED to get legs checked out.  Supra-pubic cath intact draining urine to a  bag without difficulty.   at bedside.

## 2024-07-18 NOTE — ED PROVIDER NOTES
Encounter Date: 7/18/2024       History     Chief Complaint   Patient presents with    Leg Swelling     Pt arrives with complaints of bilateral leg swelling and pain (xfew weeks), headache, and back pain. Chronic suprapubic catheter in place. Swelling and redness noted to both legs. Pt also reporting a possible right foot fx.      Patient is a 68-year-old female with a past medical history of anxiety, chronic bilateral lower extremity edema, CHF, sleep apnea, chronic lower back pain, hard of hearing, suprapubic catheter, hemiparesis following CVA who presents to the ED with multiple complaints.  Patient states that she tripped yesterday causing her hit her right foot against the hard floor.  She is concerned that she may have sustained a fracture to the right foot as she has had numerous surgeries on this foot.  She also reports worsening bilateral lower extremity swelling and redness.  She does report a history of chronic lower extremity swelling/lymphedema.  She states she takes 40 mg of Lasix daily but states that the swelling appears to be worsening as is the erythema.  She denies any new or worsening shortness of breath.  She denies any chest pain.  She denies any fevers chills nausea vomiting.  She was seen by her podiatrist today who suggested she come to the ED for evaluation and possible admission for her lymphedema versus lower extremity cellulitis.      Review of patient's allergies indicates:   Allergen Reactions    (d)-limonene flavor      Other reaction(s): difficult intubation  Other reaction(s): Difficulty breathing    Benadryl [diphenhydramine hcl] Shortness Of Breath    Fentanyl Itching, Nausea And Vomiting and Swelling             Imitrex [sumatriptan succinate] Shortness Of Breath    Oxycodone-acetaminophen Shortness Of Breath    Sulfamethoxazole-trimethoprim Anaphylaxis    Topiramate Shortness Of Breath    Vancomycin Anaphylaxis     Rash    Butorphanol tartrate     Darvocet a500 [propoxyphene  n-acetaminophen]      Other reaction(s): Difficulty breathing    Evening primrose (oenothera biennis)     Latex     Pregabalin Other (See Comments)     tremors    Sumatriptan     White petrolatum-zinc     Zinc acetate     Zinc oxide-white petrolatum      Other reaction(s): Difficulty breathing    Latex, natural rubber Itching and Rash    Phenytoin Rash and Other (See Comments)     Trouble breathing     Past Medical History:   Diagnosis Date    Anxiety     Arthritis     Bilateral lower extremity edema     severe chronic    Carotid artery occlusion     Cataract     CHF (congestive heart failure)     Coronary artery disease     subtotalled LAD with collateral    Depression     Fever blister     Hard of hearing     Hypokalemia 01/09/2023    Hyponatremia 02/04/2022    Hypothyroid     Iron deficiency anemia     Lumbar radiculopathy     with chronic pain    Ocular migraine     Other emphysema 05/22/2023    Renal disorder     Sleep apnea     cpap     Past Surgical History:   Procedure Laterality Date    ADENOIDECTOMY      BACK SURGERY      x 12    CARDIAC CATHETERIZATION  2016    subtotalled LAD with right to left collaterals    CATARACT EXTRACTION W/  INTRAOCULAR LENS IMPLANT Left     Dr Coleman     CYSTOSCOPIC LITHOLAPAXY N/A 6/27/2019    Procedure: CYSTOLITHOLAPAXY;  Surgeon: Shireen Mayo MD;  Location: [facility] OR [address] [place] [address];  Service: Urology;  Laterality: N/A;    CYSTOSCOPIC LITHOLAPAXY N/A 9/3/2019    Procedure: CYSTOLITHOLAPAXY;  Surgeon: Shireen Mayo MD;  Location: [facility] OR 2ND FLR;  Service: Urology;  Laterality: N/A;    CYSTOSCOPY N/A 7/13/2021    Procedure: CYSTOSCOPY;  Surgeon: Shireen Mayo MD;  [facility];  Service: Urology;  Laterality: N/A;    CYSTOSCOPY  11/16/2021    Procedure: CYSTOSCOPY;  Surgeon: Shireen Mayo MD;  Location: [facility] OR [address] [place] [address];  Service: Urology;;    CYSTOSCOPY  7/19/2022    Procedure: CYSTOSCOPY;  Surgeon: Shireen Myao MD;  Location: [facility] OR [address] [place] [address];   Service: Urology;;    CYSTOSCOPY WITH INJECTION OF PERIURETHRAL BULKING AGENT  7/19/2022    Procedure: CYSTOSCOPY, WITH PERIURETHRAL BULKING AGENT INJECTION-MACROPLASTIQUE;  Surgeon: Shireen Mayo MD;  Location: [facility] OR [address] [place] [address];  Service: Urology;;    CYSTOSCOPY WITH INJECTION OF PERIURETHRAL BULKING AGENT N/A 3/28/2023    Procedure: CYSTOSCOPY, WITH PERIURETHRAL BULKING AGENT INJECTION;  Surgeon: Shireen Mayo MD;  [facility];  Service: Urology;  Laterality: N/A;  [place]    CYSTOSCOPY WITH INJECTION OF PERIURETHRAL BULKING AGENT N/A 11/14/2023    Procedure: CYSTOSCOPY, WITH PERIURETHRAL BULKING AGENT INJECTION;  Surgeon: Shireen Mayo MD;  Location: [facility] OR [address] [place] [address];  Service: Urology;  Laterality: N/A;    CYSTOSCOPY,WITH BOTULINUM TOXIN INJECTION N/A 12/13/2022    Procedure: CYSTOSCOPY,WITH BOTULINUM TOXIN INJECTION;  Surgeon: Shireen Mayo MD;  Location: [facility] OR [address] [place] [address];  Service: Urology;  Laterality: N/A;  300 U    CYSTOSCOPY,WITH BOTULINUM TOXIN INJECTION N/A 3/28/2023    Procedure: CYSTOSCOPY,WITH BOTULINUM TOXIN INJECTION;  Surgeon: Shireen Mayo MD;  Location: [address];  Service: Urology;  Laterality: N/A;  45 MIN.    300 UNITS    ESOPHAGOGASTRODUODENOSCOPY N/A 5/23/2018    Procedure: ESOPHAGOGASTRODUODENOSCOPY (EGD);  Surgeon: Prince Vance MD;  Location: [facility] (4TH FLR);  Service: Endoscopy;  Laterality: N/A;  r/s 'd per Dr. Vance due to family emergency- ER    ESOPHAGOGASTRODUODENOSCOPY N/A 6/23/2023    Procedure: EGD (ESOPHAGOGASTRODUODENOSCOPY);  Surgeon: Juaquin Barry MD;  Location: [facility] ENDO (2ND FLR);  Service: Endoscopy;  Laterality: N/A;  Refferal: PRINCE VANCE  tele enc 5/18/23  dx: history of a Nissen fundoplication  Additional Scheduling Information:  On home oxygen 2nd floor for airway protection also with a pain pump elevated BMI close to 40   Prep Gastroparesis   ins    HYSTERECTOMY  1975    endometriosis    INJECTION  OF BOTULINUM TOXIN TYPE A  7/13/2021    Procedure: INJECTION, BOTULINUM TOXIN, 200units;  Surgeon: Shireen Mayo MD;  Location: [facility] OR [address] [place] [address];  Service: Urology;;    INJECTION OF BOTULINUM TOXIN TYPE A  11/16/2021    Procedure: INJECTION, BOTULINUM TOXIN, 200units;  Surgeon: Shireen Mayo MD;  Location: [facility] OR [address] [place] [address];  Service: Urology;;    INJECTION OF BOTULINUM TOXIN TYPE A  7/19/2022    Procedure: INJECTION, BOTULINUM TOXIN, 300 units ;  Surgeon: Shireen Myao MD;  [facility];  Service: Urology;;    INJECTION OF BOTULINUM TOXIN TYPE A N/A 11/14/2023    Procedure: INJECTION, BOTULINUM TOXIN, TYPE A;  Surgeon: Shireen Mayo MD;  Location: [facility] OR [address] [place] [address];  Service: Urology;  Laterality: N/A;    INSERTION, SUPRAPUBIC CATHETER N/A 12/13/2022    Procedure: INSERTION, SUPRAPUBIC CATHETER;  Surgeon: Shireen Mayo MD;  Location: [facility] OR [address] [place] [address];  Service: Urology;  Laterality: N/A;  exchange    INSERTION, SUPRAPUBIC CATHETER N/A 11/14/2023    Procedure: INSERTION, SUPRAPUBIC CATHETER;  Surgeon: Shireen Mayo MD;  Location: [facility] OR [address] [place] [address];  Service: Urology;  Laterality: N/A;  EXCHANGE of s/p tube    pain pump placement      SQ Dilaudid Pump managed by Dr. Hillman, Pain Management    REMOVAL OF BONE SPUR OF FOOT Bilateral 9/16/2022    Procedure: EXCISION ARTHRITIC BONE, BILATERAL FOOT;  Surgeon: NICOLA Mcgrath;  Location: [facility] OR;  Service: Podiatry;  Laterality: Bilateral;    REPLACEMENT OF BACLOFEN PUMP N/A 8/2/2023    Procedure: REPLACEMENT, BACLOFEN PUMP;  Surgeon: Smitha Condon MD;  Location: [facility] OR [address] [place] [address];  Service: Neurosurgery;  Laterality: N/A;  Anes: Gen  Blood: Type & Screen  Pos: Left Lat  Intrathecal Pump  Bed: Reg Reversed  Rad: C-arm  Pump: 40 cc, [employer]    REPLACEMENT OF CATHETER N/A 10/31/2019    Procedure: REPLACEMENT, CATHETER-SUPRAPUBIC;  Surgeon: Shireen Mayo MD;  Location: [facility] OR [address] [place] [address];  Service: Urology;  Laterality: N/A;     "SPINAL CORD STIMULATOR REMOVAL      before Larissa    SPINE SURGERY  5-13-13    CERVICAL FUSION    TONSILLECTOMY       Family History   Problem Relation Name Age of Onset    Cancer Mother  55        breast    Cancer Father          esophagus,had laryngectomy    Esophageal cancer Father      Parkinsonism Maternal Grandmother      Tremor Maternal Grandmother      No Known Problems Brother      No Known Problems Brother      Heart disease Maternal Uncle klarissa     Colon cancer Maternal Uncle klarissa         Less than 60    No Known Problems Sister      No Known Problems Maternal Aunt      Cirrhosis Paternal Aunt          ETOH    Liver disease Paternal Aunt          ETOH    Liver disease Paternal Uncle          ETOH    Cirrhosis Paternal Uncle          ETOH    No Known Problems Maternal Grandfather      No Known Problems Paternal Grandmother      No Known Problems Paternal Grandfather      Melanoma Neg Hx      Amblyopia Neg Hx      Blindness Neg Hx      Cataracts Neg Hx      Diabetes Neg Hx      Glaucoma Neg Hx      Hypertension Neg Hx      Macular degeneration Neg Hx      Retinal detachment Neg Hx      Strabismus Neg Hx      Stroke Neg Hx      Thyroid disease Neg Hx      Celiac disease Neg Hx      Colon polyps Neg Hx      Cystic fibrosis Neg Hx      Crohn's disease Neg Hx      Inflammatory bowel disease Neg Hx      Liver cancer Neg Hx      Rectal cancer Neg Hx      Stomach cancer Neg Hx      Ulcerative colitis Neg Hx      Lymphoma Neg Hx       Social History     Tobacco Use    Smoking status: Never    Smokeless tobacco: Never   Substance Use Topics    Alcohol use: Not Currently    Drug use: Yes     Types: Marijuana     Comment: "medical marijuana gummies"     Review of Systems   Constitutional:  Negative for chills and fatigue.   Respiratory:  Negative for cough and shortness of breath.    Cardiovascular:  Positive for leg swelling (Chronic).   Gastrointestinal:  Negative for abdominal pain, nausea and vomiting. "   Musculoskeletal:  Positive for back pain (Chronic). Negative for myalgias and neck pain.   Skin:  Positive for color change (Patient reports worsening redness to the bilateral lower extremities).   Neurological:  Negative for dizziness, light-headedness and headaches.   Psychiatric/Behavioral:  Negative for agitation and behavioral problems.        Physical Exam     Initial Vitals [07/18/24 1723]   BP Pulse Resp Temp SpO2   (!) 140/66 86 18 98.1 °F (36.7 °C) 96 %      MAP       --         Physical Exam    Nursing note and vitals reviewed.  Constitutional: She appears well-developed and well-nourished. She is not diaphoretic.   Obese  Uncomfortable appearing  No acute distress    Eyes: EOM are normal. Pupils are equal, round, and reactive to light.   Neck: Neck supple.   Normal range of motion.  Cardiovascular:  Normal rate, regular rhythm, normal heart sounds and intact distal pulses.           Pulmonary/Chest: Breath sounds normal.   Abdominal: Abdomen is soft. Bowel sounds are normal.   Musculoskeletal:         General: Edema present.      Cervical back: Normal range of motion and neck supple.      Comments: Extensive edema of the bilateral lower extremities consistent with her hx of chronic lower extremity lymphedema.   No crepitus to palpation of the bilateral lower extremities.  No bullae.  Scant weeping of serous type fluid bilaterally.  Normal sensation bilateral lower extremities      Neurological: She is alert and oriented to person, place, and time. She has normal strength.         ED Course   Procedures  Labs Reviewed   CBC W/ AUTO DIFFERENTIAL - Abnormal       Result Value    WBC 5.71      RBC 3.86 (*)     Hemoglobin 10.7 (*)     Hematocrit 33.7 (*)     MCV 87      MCH 27.7      MCHC 31.8 (*)     RDW 16.2 (*)     Platelets 251      MPV 9.3      Immature Granulocytes 0.2      Gran # (ANC) 3.3      Immature Grans (Abs) 0.01      Lymph # 1.4      Mono # 0.7      Eos # 0.3      Baso # 0.02      nRBC 0       Gran % 57.5      Lymph % 24.9      Mono % 12.1      Eosinophil % 4.9      Basophil % 0.4      Differential Method Automated     COMPREHENSIVE METABOLIC PANEL - Abnormal    Sodium 137      Potassium 3.9      Chloride 100      CO2 27      Glucose 106      BUN 14      Creatinine 1.0      Calcium 9.2      Total Protein 7.0      Albumin 3.3 (*)     Total Bilirubin 0.5      Alkaline Phosphatase 134      AST 11      ALT 7 (*)     eGFR >60      Anion Gap 10     B-TYPE NATRIURETIC PEPTIDE    BNP 32     TROPONIN I    Troponin I <0.006            Imaging Results              X-Ray Foot Complete Right (Final result)  Result time 07/18/24 20:50:19      Final result by Simone Rivera MD (07/18/24 20:50:19)                   Impression:      1. There is diffuse edema about the foot and lower leg.  No convincing acute displaced fracture or dislocation noting sequela of prior fractures and progressive degenerative changes.      Electronically signed by: Simone Rivera MD  Date:    07/18/2024  Time:    20:50               Narrative:    EXAMINATION:  XR FOOT COMPLETE 3 VIEW RIGHT    CLINICAL HISTORY:  . Pain in right foot    TECHNIQUE:  AP, lateral, and oblique views of the right foot were performed.    COMPARISON:  12/28/2023    FINDINGS:  Three views right foot.    There are remote appearing irregularly healed fractures involving the distal aspects of the 2nd, 3rd, 4th and 5th metatarsals.  There is osteopenia.  No convincing acute displaced fracture or dislocation of the foot.  There is diffuse edema about the foot.  There are progressive degenerative changes of the midfoot.                                       X-Ray Chest AP Portable (Final result)  Result time 07/18/24 20:46:25      Final result by Simone Rivera MD (07/18/24 20:46:25)                   Impression:      1. Chronic appearing interstitial findings, no large focal consolidation.      Electronically signed by: Simone Rivera  MD  Date:    07/18/2024  Time:    20:46               Narrative:    EXAMINATION:  XR CHEST AP PORTABLE    CLINICAL HISTORY:  Other specified soft tissue disorders    TECHNIQUE:  Single frontal view of the chest was performed.    COMPARISON:  05/22/2024    FINDINGS:  The cardiomediastinal silhouette is prominent, magnified by technique..  There is no pleural effusion.  The trachea is midline.  The lungs are symmetrically expanded bilaterally with coarse interstitial attenuation and bilateral basilar subsegmental atelectasis, left greater than right..  No large focal consolidation seen.  There is no pneumothorax.  The osseous structures are remarkable for degenerative change..  Spinal stimulator noted.  There are surgical changes of the cervical spine.                                       Medications   furosemide injection 40 mg (40 mg Intravenous Given 7/18/24 2028)   HYDROmorphone injection 1 mg (1 mg Intravenous Given 7/18/24 2122)     Medical Decision Making  Amount and/or Complexity of Data Reviewed  Labs: ordered.  Radiology: ordered.    Risk  Prescription drug management.                          Medical Decision Making:   Initial Assessment:   See HPI   Clinical Tests:   Lab Tests: Ordered  Radiological Study: Ordered  Medical Tests: Ordered  ED Management:  - routine imaging, labs ordered, pending as of shift change  - care of patient assumed by on-coming ED physician, Dr. Kristian Giordano, as of shift change. Dr. Giordano was extensively updated regarding the patient's presentation and emergency department work up. Dr. Giordano will ultimately be responsible for final plan and disposition of this patient. Please see Dr. Giordano's  notes/updates for further details/plan of care                 Clinical Impression:  Final diagnoses:  [M79.671] Foot pain, right  [M79.89] Leg swelling  [I89.0] Chronic acquired lymphedema (Primary)          ED Disposition Condition    Discharge Stable          ED Prescriptions        Medication Sig Dispense Start Date End Date Auth. Provider    furosemide (LASIX) 40 MG tablet (Expires today) Take 1 tablet (40 mg total) by mouth 2 (two) times a day. for 3 days 6 tablet 7/18/2024 7/21/2024 Kristian Giordano MD    cyclobenzaprine (FLEXERIL) 10 MG tablet Take 1 tablet (10 mg total) by mouth 3 (three) times daily as needed for Muscle spasms. 15 tablet 7/18/2024 7/23/2024 Kristian Giordano MD          Follow-up Information    None          Tushar Muniz MD  07/21/24 3465

## 2024-07-19 NOTE — PROVIDER PROGRESS NOTES - EMERGENCY DEPT.
Encounter Date: 7/18/2024    ED Physician Progress Notes          Patient received a sign-out from the outgoing ED physician, we discussed elements of care.  It has a 68-year-old female well known to myself from previous visits, history of severe chronic lymphedema and multiple medical comorbidities, who presents for acute exacerbation of chronic leg swelling.  She is hemodynamically stable in no acute distress, does not have any supplemental oxygen requirement.  The legs do not appear infected.  There is erythema of the areas affected by lymphedema that blanches with elevation or direct pressure, and is inconsistent with cellulitis.  The findings are symmetric bilaterally.  This is similar to my previous evaluations.  Analgesics provided here and to go home, with consideration to her pain management contract no opiates were written for.  We will increase her home Lasix for a few days, counseled again on elevating legs when resting, non compression stocks.  I additionally counseled them on how to directly purchase comfortable diabetic shoes and extra large compression stockings using the Internet if they are not available at local stores.  No other EMC by [facility].  Patient on initial evaluation asked when she can be discharged home.

## 2024-07-19 NOTE — ED NOTES
Pt asking to speak with md. Md informed. Pt asking for water. Pt informed that she it is in her best interest not to eat or drink until lab and x ray results clear. Pt verbalized understanding but is still complaining of mouth dryness.

## 2024-07-19 NOTE — ED NOTES
IV attempt x2 unsuccessful. Provider at bedside, photos of legs taken and placed in media sections of chart.

## 2024-07-23 ENCOUNTER — OUTPATIENT CASE MANAGEMENT (OUTPATIENT)
Dept: ADMINISTRATIVE | Facility: OTHER | Age: 69
End: 2024-07-23
Payer: MEDICARE

## 2024-07-23 NOTE — PROGRESS NOTES
Outpatient Care Management   - Care Plan Follow Up    Patient: Tasha Hawley  MRN:  [id number]  Date of Service:  7/23/2024  Completed by:  Tatiana Guzman LCSW  Referral Date: 05/24/2024    Reason for Visit   Patient presents with    OPCM [facility] Follow Up Call     07/23/2024    Case Closure     07/23/2024       Brief Summary: Spoke with patient who reports she went to the ER recently due to the increased swelling in her legs. Patient states she still has swelling and now has sores on her arms. Patient requested help with getting a dermatology appt. SW messaged PCP to request a referral for dermatology. Patient states her spouse has been helping her at home since she is having trouble ambulating due to the swelling. Patient denies wanting help with in-home care and does not want to move out of her home. Patient reports she explored switching to [employer], however, she cannot switch her insurance until October. Patient states she is looking into getting Medicare part C & D in the meantime. Patient denies any issues with food insecurity or transportation, and no other needs were identified at this time. Will close case and notify OPCM RN.    Complex Care Plan     Care plan was discussed and completed today with input from patient and/or caregiver.    Patient Instructions     No follow-ups on file.

## 2024-07-24 ENCOUNTER — OUTPATIENT CASE MANAGEMENT (OUTPATIENT)
Dept: ADMINISTRATIVE | Facility: OTHER | Age: 69
End: 2024-07-24
Payer: MEDICARE

## 2024-07-24 ENCOUNTER — TELEPHONE (OUTPATIENT)
Dept: UROLOGY | Facility: CLINIC | Age: 69
End: 2024-07-24
Payer: MEDICARE

## 2024-07-24 LAB
OHS QRS DURATION: 102 MS
OHS QTC CALCULATION: 487 MS

## 2024-07-24 NOTE — TELEPHONE ENCOUNTER
----- Message from Zoe Mcgarry LPN sent at 7/24/2024  4:21 PM CDT -----  Regarding: FW: Callback  Contact: [phone]    ----- Message -----  From: Kevin Gutierrez  Sent: 7/24/2024  10:54 AM CDT  To: [facility] Staff  Subject: Callback                                         Patient calling requesting a callback from nurse or provider in regards to bad yeast infection and bladder infection and need medication called in. Please call back as soon as possible.    [facility] [place] - [place] - [address] [facility] [address]  [address] [facility] [address]  [place] [address]  Phone: [phone] Fax: [phone]

## 2024-07-24 NOTE — PROGRESS NOTES
Outpatient Care Management  Plan of Care Follow Up Visit    Patient: Tasha Hawley  MRN: [id number]  Date of Service: 07/24/2024  Completed by: Caitlin Ordaz RN  Referral Date: 05/24/2024    Reason for Visit   Patient presents with    OPCM RN Follow Up Call    OPCM Chart Review       Brief Summary:   OPCM RN follow-up call completed.   Continue education on Lymphedema, HF, Pain.   Next Steps: Patient is in agreement to follow-up call on or around 7.29.2024.

## 2024-07-25 ENCOUNTER — TELEPHONE (OUTPATIENT)
Dept: PULMONOLOGY | Facility: CLINIC | Age: 69
End: 2024-07-25
Payer: MEDICARE

## 2024-07-25 NOTE — TELEPHONE ENCOUNTER
I spoke with patient in regards to rescheduling her appointment due to booked out. Patient is scheduled on 10/24/24 at 11 AM. Appointment mailed. Patient confirmed and verbalized understanding.

## 2024-07-25 NOTE — TELEPHONE ENCOUNTER
----- Message from Catalina Magdaleno sent at 7/25/2024 12:29 PM CDT -----  Regarding: pt advice  Contact: pt [phone]  Pt calling to speak to Dr. Mayo's nurse regarding bladder infection. Pls call

## 2024-07-26 ENCOUNTER — TELEPHONE (OUTPATIENT)
Dept: INTERNAL MEDICINE | Facility: CLINIC | Age: 69
End: 2024-07-26
Payer: MEDICARE

## 2024-07-26 NOTE — TELEPHONE ENCOUNTER
----- Message from Nancy Lama sent at 7/26/2024  2:03 PM CDT -----  Contact: Self/ [phone]  1MEDICALADVICE     Patient is calling for Medical Advice regarding:swelling in legs arms/blisters     How long has patient had these symptoms:    Pharmacy name and phone#:NA    Patient wants a call back or [facility]:Call back , could not get triage nurse was placed on hold due to calls in que    Comments:Symptoms: Leg Swelling - Not From Injury, Blisters  Outcome: Talk to a nurse or provider within 15 minutes.  Reason: Severe leg pain now    The caller accepted this outcome      Please advise patient replies from provider may take up to 48 hours.

## 2024-07-28 ENCOUNTER — OFFICE VISIT (OUTPATIENT)
Dept: URGENT CARE | Facility: CLINIC | Age: 69
End: 2024-07-28
Payer: MEDICARE

## 2024-07-28 ENCOUNTER — NURSE TRIAGE (OUTPATIENT)
Dept: ADMINISTRATIVE | Facility: CLINIC | Age: 69
End: 2024-07-28
Payer: MEDICARE

## 2024-07-28 VITALS
DIASTOLIC BLOOD PRESSURE: 75 MMHG | OXYGEN SATURATION: 95 % | HEIGHT: 64 IN | RESPIRATION RATE: 20 BRPM | HEART RATE: 78 BPM | WEIGHT: 239 LBS | SYSTOLIC BLOOD PRESSURE: 125 MMHG | TEMPERATURE: 97 F | BODY MASS INDEX: 40.8 KG/M2

## 2024-07-28 DIAGNOSIS — I89.0 LYMPHEDEMA OF LOWER EXTREMITY: ICD-10-CM

## 2024-07-28 DIAGNOSIS — R52 PAIN: ICD-10-CM

## 2024-07-28 DIAGNOSIS — L97.529 SKIN ULCERS OF FOOT, BILATERAL: Primary | ICD-10-CM

## 2024-07-28 DIAGNOSIS — H10.9 CONJUNCTIVITIS OF BOTH EYES, UNSPECIFIED CONJUNCTIVITIS TYPE: ICD-10-CM

## 2024-07-28 DIAGNOSIS — L97.519 SKIN ULCERS OF FOOT, BILATERAL: Primary | ICD-10-CM

## 2024-07-28 PROCEDURE — 87070 CULTURE OTHR SPECIMN AEROBIC: CPT

## 2024-07-28 PROCEDURE — 73630 X-RAY EXAM OF FOOT: CPT | Mod: FY,LT,S$GLB, | Performed by: RADIOLOGY

## 2024-07-28 PROCEDURE — 73630 X-RAY EXAM OF FOOT: CPT | Mod: FY,RT,S$GLB, | Performed by: RADIOLOGY

## 2024-07-28 PROCEDURE — 87186 SC STD MICRODIL/AGAR DIL: CPT

## 2024-07-28 PROCEDURE — 99214 OFFICE O/P EST MOD 30 MIN: CPT | Mod: S$GLB,,,

## 2024-07-28 RX ORDER — DOXYCYCLINE HYCLATE 100 MG
100 TABLET ORAL EVERY 12 HOURS
Qty: 14 TABLET | Refills: 0 | Status: SHIPPED | OUTPATIENT
Start: 2024-07-28 | End: 2024-07-31

## 2024-07-28 RX ORDER — NAPROXEN 500 MG/1
500 TABLET ORAL 2 TIMES DAILY WITH MEALS
Qty: 30 TABLET | Refills: 0 | Status: SHIPPED | OUTPATIENT
Start: 2024-07-28

## 2024-07-28 RX ORDER — MUPIROCIN 20 MG/G
OINTMENT TOPICAL 3 TIMES DAILY
Qty: 15 G | Refills: 0 | Status: SHIPPED | OUTPATIENT
Start: 2024-07-28 | End: 2024-08-04

## 2024-07-28 RX ORDER — OFLOXACIN 3 MG/ML
1 SOLUTION/ DROPS OPHTHALMIC 4 TIMES DAILY
Qty: 5 ML | Refills: 0 | Status: SHIPPED | OUTPATIENT
Start: 2024-07-28 | End: 2024-08-04

## 2024-07-28 NOTE — PATIENT INSTRUCTIONS
Infection: Doxycycline twice daily for 7 days  Ulcer: Apply cream to area 2-3 times daily   Follow up with wound care: [phone]  Please drink plenty of fluids.  Please get plenty of rest.  Please return here or go to the Emergency Department for any concerns or worsening of condition.  If you were prescribed a narcotic medication, do not drive or operate heavy equipment or machinery while taking these medications.  If you were not prescribed an anti-inflammatory medication, and if you do not have any history of stomach/intestinal ulcers, or kidney disease, or are not taking a blood thinner such as Coumadin, Plavix, Pradaxa, Eloquis, or Xaralta for example, it is OK to take over the counter Ibuprofen or Advil or Motrin or Aleve as directed.  Do not take these medications on an empty stomach.  Rest, ice, compression and elevation to the affected joint or limb as needed.  Please follow up with your primary care doctor or specialist as needed.    If you  smoke, please stop smoking.

## 2024-07-28 NOTE — TELEPHONE ENCOUNTER
Pt's  calling in, pt has a sore on the bottom of her left foot and it is leaking. Wondering if there is a wound care place. Has lymphedema in her legs. Wound looks infected, larger than 2 inches. Dispo is see HCP within 4 hours. Pt and  verbalized understanding and will go to . Advised to call back with further concerns.    Reason for Disposition   [1] Looks infected (e.g., spreading redness, pus) AND [2] large red area (> 2 in. or 5 cm)    Additional Information   Negative: Sounds like a life-threatening emergency to the triager   Negative: Patient sounds very sick or weak to the triager   Negative: [1] Red area or streak AND [2] fever    Protocols used: Sores-A-AH

## 2024-07-28 NOTE — PROGRESS NOTES
"Subjective:      Patient ID: Tasha Hawley is a 68 y.o. female.    Vitals:  height is 5' 4" (1.626 m) and weight is 108.4 kg (238 lb 15.7 oz). Her oral temperature is 97.4 °F (36.3 °C). Her blood pressure is 125/75 and her pulse is 78. Her respiration is 20 and oxygen saturation is 95%.     Chief Complaint: Foot Swelling    Pt is a 67 yo female with PMHx of CHF, Lymphedema, CAD, GERD. She is presenting with sore on foot. Onset of symptoms was 2 days ago. Pain worse with pressure and worsening. Pain is affecting ability to walk. Pt reports using OTC Neosporin, Aleve without relief. Called wound care who suggested coming to .  present during exam.     Edema  This is a new problem. The current episode started in the past 7 days. The problem occurs constantly. The problem has been unchanged. Pertinent negatives include no abdominal pain, anorexia, arthralgias, change in bowel habit, chest pain, chills, congestion, coughing, diaphoresis, fatigue, fever, headaches, joint swelling, myalgias, nausea, neck pain, numbness, rash, sore throat, swollen glands, urinary symptoms, vertigo, visual change, vomiting or weakness. The symptoms are aggravated by twisting, standing, walking and bending. Treatments tried: neosporin, alev. The treatment provided no relief.       Constitution: Negative for activity change, appetite change, chills, sweating, fatigue and fever.   HENT:  Negative for ear pain, congestion, postnasal drip, sinus pain, sinus pressure and sore throat.    Neck: Negative for neck pain.   Cardiovascular:  Negative for chest pain and sob on exertion.   Eyes:  Negative for eye trauma, eye discharge, eye itching, eye redness, photophobia and blurred vision.   Respiratory:  Negative for cough, shortness of breath, wheezing and asthma.    Gastrointestinal:  Negative for abdominal pain, nausea, vomiting, constipation and diarrhea.   Genitourinary:  Negative for dysuria, frequency, urgency, urine decreased and " hematuria.   Musculoskeletal:  Negative for pain, joint pain, joint swelling and muscle ache.   Skin:  Positive for wound. Negative for color change, rash and hives.   Allergic/Immunologic: Negative for seasonal allergies, asthma, hives and sneezing.   Neurological:  Negative for dizziness, history of vertigo, light-headedness, headaches, altered mental status and numbness.   Psychiatric/Behavioral:  Negative for altered mental status and confusion.       Objective:     Physical Exam   Constitutional: She is oriented to person, place, and time. She appears well-developed. She is cooperative.  Non-toxic appearance. She does not appear ill. No distress.      Comments:Pt sitting erect on examination table. No acute respiratory distress, no use of accessory muscles, no notice of nasal flaring.        HENT:   Head: Normocephalic and atraumatic.   Ears:   Right Ear: Hearing, tympanic membrane, external ear and ear canal normal.   Left Ear: Hearing, tympanic membrane, external ear and ear canal normal.   Nose: Nose normal. No mucosal edema, rhinorrhea, nasal deformity or congestion. No epistaxis. Right sinus exhibits no maxillary sinus tenderness and no frontal sinus tenderness. Left sinus exhibits no maxillary sinus tenderness and no frontal sinus tenderness.   Mouth/Throat: Uvula is midline, oropharynx is clear and moist and mucous membranes are normal. Mucous membranes are moist. No trismus in the jaw. Normal dentition. No uvula swelling. No oropharyngeal exudate, posterior oropharyngeal edema or posterior oropharyngeal erythema. Oropharynx is clear.   Eyes: Conjunctivae and lids are normal. Pupils are equal, round, and reactive to light. No scleral icterus. Extraocular movement intact   Neck: Trachea normal and phonation normal. Neck supple. No edema present. No erythema present. No neck rigidity present.   Cardiovascular: Normal rate, regular rhythm, normal heart sounds and normal pulses.   Pulmonary/Chest: Effort  normal and breath sounds normal. No accessory muscle usage. No apnea, no tachypnea and no bradypnea. No respiratory distress. She has no decreased breath sounds. She has no rhonchi.   Abdominal: Normal appearance.   Musculoskeletal: Normal range of motion.         General: No deformity. Normal range of motion.      Right ankle: She exhibits swelling. Tenderness.      Left ankle: She exhibits swelling. Tenderness.      Right lower leg: She exhibits tenderness and swelling.      Left lower leg: She exhibits tenderness and swelling.      Right foot: Tenderness and swelling present.      Left foot: Tenderness and swelling present.        Feet:       Comments: Lower extremities are erythematous, with white discharge draining from legs  Very tender to palpation B/L   Neurological: She is alert and oriented to person, place, and time. She exhibits normal muscle tone. Gait abnormal. Coordination normal.   Skin: Skin is warm, dry, intact, not diaphoretic and not pale.   Psychiatric: Her speech is normal and behavior is normal. Judgment and thought content normal.   Nursing note and vitals reviewed.            Assessment:     1. Skin ulcers of foot, bilateral    2. Lymphedema of lower extremity    3. Pain    4. Conjunctivitis of both eyes, unspecified conjunctivitis type        Plan:   I have reviewed the patient chart and pertinent past imaging/labs.  Due to history, ordered X-ray to r/o osteomyelitis. Interpreted by provider, without any signs of bone destruction. Treating for MRSA with doxycycline and wound clinic referral placed. Awaiting wound culture results. Pt has PCP appointment tmrw and encouraged to go to ER if symptoms worsen prior to visit.     eGFR (2024): >60    Skin ulcers of foot, bilateral  -     X-Ray Foot Complete 3 view Right; Future; Expected date: 07/28/2024  -     X-Ray Foot Complete 3 view Left; Future; Expected date: 07/28/2024  -     CULTURE, AEROBIC  (SPECIFY SOURCE)  -     doxycycline (VIBRA-TABS)  100 MG tablet; Take 1 tablet (100 mg total) by mouth every 12 (twelve) hours. for 7 days  Dispense: 14 tablet; Refill: 0  -     mupirocin (BACTROBAN) 2 % ointment; Apply topically 3 (three) times daily. for 7 days  Dispense: 15 g; Refill: 0  -     Ambulatory referral/consult to Wound Clinic    Lymphedema of lower extremity    Pain  -     naproxen (NAPROSYN) 500 MG tablet; Take 1 tablet (500 mg total) by mouth 2 (two) times daily with meals.  Dispense: 30 tablet; Refill: 0    Conjunctivitis of both eyes, unspecified conjunctivitis type  -     ofloxacin (OCUFLOX) 0.3 % ophthalmic solution; Place 1 drop into the left eye 4 (four) times daily. for 7 days  Dispense: 5 mL; Refill: 0

## 2024-07-29 ENCOUNTER — OUTPATIENT CASE MANAGEMENT (OUTPATIENT)
Dept: ADMINISTRATIVE | Facility: OTHER | Age: 69
End: 2024-07-29
Payer: MEDICARE

## 2024-07-29 ENCOUNTER — OFFICE VISIT (OUTPATIENT)
Dept: INTERNAL MEDICINE | Facility: CLINIC | Age: 69
End: 2024-07-29
Payer: MEDICARE

## 2024-07-29 VITALS — SYSTOLIC BLOOD PRESSURE: 126 MMHG | DIASTOLIC BLOOD PRESSURE: 78 MMHG | HEART RATE: 66 BPM

## 2024-07-29 DIAGNOSIS — R53.81 PHYSICAL DECONDITIONING: ICD-10-CM

## 2024-07-29 DIAGNOSIS — F11.20 OPIOID DEPENDENCE, UNCOMPLICATED: ICD-10-CM

## 2024-07-29 DIAGNOSIS — H57.89 RED EYE: ICD-10-CM

## 2024-07-29 DIAGNOSIS — R23.8 SKIN BREAKDOWN: ICD-10-CM

## 2024-07-29 DIAGNOSIS — L57.0 MULTIPLE ACTINIC KERATOSES: ICD-10-CM

## 2024-07-29 DIAGNOSIS — I89.0 LYMPHEDEMA: Primary | ICD-10-CM

## 2024-07-29 DIAGNOSIS — H60.313 CHRONIC DIFFUSE OTITIS EXTERNA OF BOTH EARS: ICD-10-CM

## 2024-07-29 DIAGNOSIS — G89.4 CHRONIC PAIN SYNDROME: Chronic | ICD-10-CM

## 2024-07-29 DIAGNOSIS — G47.01 INSOMNIA DUE TO MEDICAL CONDITION: Chronic | ICD-10-CM

## 2024-07-29 PROCEDURE — 3078F DIAST BP <80 MM HG: CPT | Mod: CPTII,S$GLB,, | Performed by: INTERNAL MEDICINE

## 2024-07-29 PROCEDURE — 99999 PR PBB SHADOW E&M-EST. PATIENT-LVL III: CPT | Mod: PBBFAC,,, | Performed by: INTERNAL MEDICINE

## 2024-07-29 PROCEDURE — 3074F SYST BP LT 130 MM HG: CPT | Mod: CPTII,S$GLB,, | Performed by: INTERNAL MEDICINE

## 2024-07-29 PROCEDURE — 3288F FALL RISK ASSESSMENT DOCD: CPT | Mod: CPTII,S$GLB,, | Performed by: INTERNAL MEDICINE

## 2024-07-29 PROCEDURE — 99215 OFFICE O/P EST HI 40 MIN: CPT | Mod: S$GLB,,, | Performed by: INTERNAL MEDICINE

## 2024-07-29 PROCEDURE — 1101F PT FALLS ASSESS-DOCD LE1/YR: CPT | Mod: CPTII,S$GLB,, | Performed by: INTERNAL MEDICINE

## 2024-07-29 PROCEDURE — 1125F AMNT PAIN NOTED PAIN PRSNT: CPT | Mod: CPTII,S$GLB,, | Performed by: INTERNAL MEDICINE

## 2024-07-29 PROCEDURE — 1160F RVW MEDS BY RX/DR IN RCRD: CPT | Mod: CPTII,S$GLB,, | Performed by: INTERNAL MEDICINE

## 2024-07-29 PROCEDURE — 3044F HG A1C LEVEL LT 7.0%: CPT | Mod: CPTII,S$GLB,, | Performed by: INTERNAL MEDICINE

## 2024-07-29 PROCEDURE — 1159F MED LIST DOCD IN RCRD: CPT | Mod: CPTII,S$GLB,, | Performed by: INTERNAL MEDICINE

## 2024-07-29 RX ORDER — NEOMYCIN SULFATE, POLYMYXIN B SULFATE AND HYDROCORTISONE 10; 3.5; 1 MG/ML; MG/ML; [USP'U]/ML
3 SUSPENSION/ DROPS AURICULAR (OTIC) 4 TIMES DAILY
Qty: 10 ML | Refills: 0 | Status: SHIPPED | OUTPATIENT
Start: 2024-07-29

## 2024-07-29 RX ORDER — TOBRAMYCIN AND DEXAMETHASONE 3; 1 MG/ML; MG/ML
1 SUSPENSION/ DROPS OPHTHALMIC
Qty: 5 ML | Refills: 0 | Status: SHIPPED | OUTPATIENT
Start: 2024-07-29

## 2024-07-29 NOTE — PROGRESS NOTES
Subjective:       Patient ID: Tasha Hawley is a 68 y.o. female.    Chief Complaint:   Follow-up    HPI - 35 minutes late for a 20 minute appointment today.  She is a chronically ill patient of mine with multiple medical problems.  She virtually always meets criteria for admission.  Currently, she is suffering most from lymphedema of bilateral lower extremities with skin breakdown on her heels.  She was seen in urgent care yesterday for evaluation and antibiotics were prescribed; she did not pick them up.   relates that she spent all day Friday and Saturday on her feet in the kitchen cooking.  When she does this, her edema worsens.  We have had multiple conversations about her living situation and that she needs to be in assisted living; she remains opposed to this.  Her  is frail and older; he can not care for her alone in their home.  There is a daughter who is willing to take care of her, but she refuses to stay with her daughter.  She brought her pill bottles into clinic for reconciliation today.  She also complained of bilateral red eyes with crusting and ear pain bilaterally.  She asked about reddish pink lesions across bilateral arms.  She is not a smoker.    PMH:  Neurogenic bladder, with recurrent UTI's. Suprapubic catheter  CAD, with ACS in Jan 2016 - subtot mid LAD lesion without PCI; ischemic CM with EF 40% and chronic LE edema. Followed by [facility] cardiology  Chronic insomnia  Gross Lymphedema bilateral LE's with intermittent weeping and skin breakdown  Chronic low back pain with indwelling narcotic pump  History CVA with dysarthria and swallowing difficulty  Hypothyroidism  CECI, on CPAP  Anxiety and Depression  Chronic constipation, d/t ongoing narcotic use.  Dependent on home oxygen     Meds: Reviewed and reconciled in EPIC with patient during visit today.     Review of Systems   Constitutional:  Negative for fever.   HENT:  Positive for ear pain. Negative for congestion.    Eyes:   Positive for discharge and redness.   Respiratory:  Negative for shortness of breath.    Cardiovascular:  Positive for leg swelling.   Gastrointestinal:  Negative for abdominal pain.   Genitourinary:  Negative for difficulty urinating (catheter).   Musculoskeletal:  Positive for back pain, gait problem and myalgias.   Skin:  Positive for rash.   Neurological:  Negative for headaches.   Psychiatric/Behavioral:  Positive for sleep disturbance.        Objective:      Physical Exam  Constitutional:       Appearance: She is obese.      Comments: Examined in wheelchair.  Crying intermittently during interview and exam.   HENT:      Head: Normocephalic and atraumatic.      Right Ear: There is no impacted cerumen.      Left Ear: There is no impacted cerumen.      Ears:      Comments: Bilateral EACs with edema and erythema  Eyes:      Comments: Bilateral blepharitis   Pulmonary:      Effort: Pulmonary effort is normal. No respiratory distress.   Musculoskeletal:      Right lower leg: Edema present.      Left lower leg: Edema present.   Skin:     Comments: Widespread actinic keratoses across bilateral forearms.    Remarkable lower extremity edema with blistering and skin breakdown.  Erythematous and warm   Neurological:      Mental Status: Mental status is at baseline.   Psychiatric:      Comments: Under duress         Assessment:       1. Lymphedema    2. Skin breakdown    3. Red eye    4. Chronic diffuse otitis externa of both ears    5. Physical deconditioning    6. Opioid dependence, uncomplicated    7. Insomnia due to medical condition    8. Chronic pain syndrome    9. Multiple actinic keratoses        Plan:       Tasha was seen today for follow-up.    Diagnoses and all orders for this visit:    Lymphedema - it is hard to know whether this is cellulitis or not.  I favor simple lymphedema that is worse because she over exerted herself on her feet in the kitchen.  I encouraged her to stay in the wheelchair or in her  recliner with her feet elevated at home.  She can also stay in the bed with the feet elevated.  Do this until the swelling goes away.    Skin breakdown - worsening.  The approach above should help.  She can take the antibiotics given by urgent care.    Red eye - new problem.  TobraDex should help  -     tobramycin-dexAMETHasone 0.3-0.1% (TOBRADEX) 0.3-0.1 % DrpS; Place 1 drop into both eyes every 6 (six) hours while awake.    Chronic diffuse otitis externa of both ears - new problem.  Topical Cortisporin should help  -     neomycin-polymyxin-hydrocortisone (CORTISPORIN) 3.5-10,000-1 mg/mL-unit/mL-% otic suspension; Place 3 drops into both ears 4 (four) times daily.    Physical deconditioning - she is failing at home.  Her  can not care for her, and she can not care for herself.  She is doing things to sabotage, like standing on her feet and trying to cook.  She is resistant to our suggestions of moving to an assisted living situation.    Opioid dependence, uncomplicated    Insomnia due to medical condition - this is stable.  Continue present care    Chronic pain syndrome - her pain regimen is smaller than it has been in the past.  I will not add oral narcotics back to her regimen    Multiple actinic keratoses - new problem.  She is to chronically ill to address this right now.  Hopefully sometime of bedrest and elevation of the legs we will help stabilize her situation    RTC 6 weeks    PAPO Hodges MD MPH  Staff Internist

## 2024-07-30 ENCOUNTER — OUTPATIENT CASE MANAGEMENT (OUTPATIENT)
Dept: ADMINISTRATIVE | Facility: OTHER | Age: 69
End: 2024-07-30
Payer: MEDICARE

## 2024-07-30 NOTE — PROGRESS NOTES
Outpatient Care Management  Plan of Care Follow Up Visit    Patient: Tasha Hawley  MRN: [id number]  Date of Service: 07/30/2024  Completed by: Caitlin Ordaz RN  Referral Date: 05/24/2024    Reason for Visit   Patient presents with    OPCM RN Follow Up Call    OPCM Chart Review       Brief Summary:   OPCM RN follow-up call completed.   Continue education on Wounds, Pain Management, Fall Prevention.  Next Steps: Patient is in agreement to follow-up call on or around 8/6/2024.

## 2024-07-31 ENCOUNTER — TELEPHONE (OUTPATIENT)
Dept: GASTROENTEROLOGY | Facility: CLINIC | Age: 69
End: 2024-07-31
Payer: MEDICARE

## 2024-07-31 ENCOUNTER — OUTPATIENT CASE MANAGEMENT (OUTPATIENT)
Dept: ADMINISTRATIVE | Facility: OTHER | Age: 69
End: 2024-07-31
Payer: MEDICARE

## 2024-07-31 ENCOUNTER — TELEPHONE (OUTPATIENT)
Dept: URGENT CARE | Facility: CLINIC | Age: 69
End: 2024-07-31
Payer: MEDICARE

## 2024-07-31 ENCOUNTER — TELEPHONE (OUTPATIENT)
Dept: ENDOSCOPY | Facility: HOSPITAL | Age: 69
End: 2024-07-31
Payer: MEDICARE

## 2024-07-31 VITALS — WEIGHT: 240 LBS | HEIGHT: 68 IN | BODY MASS INDEX: 36.37 KG/M2

## 2024-07-31 DIAGNOSIS — R13.10 DYSPHAGIA, UNSPECIFIED TYPE: Primary | ICD-10-CM

## 2024-07-31 DIAGNOSIS — L97.529 SKIN ULCERS OF FOOT, BILATERAL: Primary | ICD-10-CM

## 2024-07-31 DIAGNOSIS — L97.519 SKIN ULCERS OF FOOT, BILATERAL: Primary | ICD-10-CM

## 2024-07-31 LAB — BACTERIA SPEC AEROBE CULT: ABNORMAL

## 2024-07-31 RX ORDER — CIPROFLOXACIN 500 MG/1
500 TABLET ORAL 2 TIMES DAILY
Qty: 14 TABLET | Refills: 0 | Status: SHIPPED | OUTPATIENT
Start: 2024-07-31 | End: 2024-08-07

## 2024-07-31 NOTE — TELEPHONE ENCOUNTER
Patients wound culture came back positive for Pseudomonas.  Advised to stop doxycycline and start Cipro.  Patient advised a black box warning with possibility for tendon rupture.  Advised to take with food and as prescribed patient has good understanding we will follow up as needed

## 2024-07-31 NOTE — TELEPHONE ENCOUNTER
Spoke to Tasha to schedule procedure(s) Upper Endoscopy (EGD)       Physician to perform procedure(s) Dr. NIEVES Cortés   Date of Procedure (s) 8/12/24  Arrival Time 2:15PM  Time of Procedure(s) 3:15 PM   Location of Procedure(s) [facility] [address]nd Floor  Type of Rx Prep sent to patient: Other  Instructions provided to patient via Postal Mail    Patient was informed on the following information and verbalized understanding. Screening questionnaire reviewed with patient and complete. If procedure requires anesthesia, a responsible adult needs to be present to accompany the patient home, patient cannot drive after receiving anesthesia. Appointment details are tentative, especially check-in time. Patient will receive a prep-op call 7 days prior to confirm check-in time for procedure. If applicable the patient should contact their pharmacy to verify Rx for procedure prep is ready for pick-up. Patient was advised to call the scheduling department at [phone] if pharmacy states no Rx is available. Patient was advised to call the endoscopy scheduling department if any questions or concerns arise.      [facility] Scheduling Department

## 2024-07-31 NOTE — TELEPHONE ENCOUNTER
"Prince Vance MD Davis, Kenyatta M., MA; Kayley Carpio MA  Caller: Unspecified (Today,  2:48 PM)  Procedure: EGD    Diagnosis: Dysphagia    Procedure Timing: Within 4 weeks (Urgent)    *If within 4 weeks selected, please shyla as high priority*    *If greater than 12 weeks, please select "5-12 weeks" and delay sending until 3 months prior to requested date*    [facility] need sees location for airway protection and patient is debilitated    Additional Scheduling Information: No scheduling concerns    Prep Specifications:Gastroparesis (Extended clear liquid)    Is the patient taking a GLP-1 Agonist:no    Have you attached a patient to this message: yes          Previous Messages    Patient Calls  (Newest Message First)  View All Conversations on this Encounter  Tonie Savage MA42 minutes ago (4:56 PM)     CHICA BENNETT spoke with patient. Mrs. Hwaley is aware she will receive a call from our endoscopy nurse to schedule an EGD. Patient verbalized understanding.          Note      Prince Vance MD  You; Tonie Savage MA51 minutes ago (4:47 PM)       Procedure: EGD    Diagnosis: Dysphagia    Procedure Timing: Within 4 weeks (Urgent)    *If within 4 weeks selected, please shyla as high priority*    *If greater than 12 weeks, please select "5-12 weeks" and delay sending until 3 months prior to requested date*    [facility] need sees location for airway protection and patient is debilitated    Additional Scheduling Information: No scheduling concerns    Prep Specifications:Gastroparesis (Extended clear liquid)    Is the patient taking a GLP-1 Agonist:no    Have you attached a patient to this message: yes      Tonie Savage MA Spera, Melissa A., MD; Prince Vance MD1 hour ago (3:40 PM)     CHICA  Please advise, thanks.    Patient is requesting a call from Dr. Vance.     Tonie Savage MA1 " "hour ago (3:40 PM)     KD  Patient is calling with complaints of trouble swallowing (both solid and liquids). Per patient, "At times, it feels like the food is stuck in my throat or feels like my throat is trying to close up".      Patient was informed to go to the nearest ED if she is experiencing any of these symptoms. Mrs. Hwaley verbalized underderstanding, but stated, she is still able to swallow without food getting stuck at this time.      Dr. Cortés is currently out of the office and informed the patient a message would be sent to Dr. Vance for recommendations.           Note     Tonie Savage, MA2 hours ago (2:49 PM)       ----- Message from Kamila Bass sent at 7/31/2024  2:33 PM CDT -----  Regarding: Appt  Contact: [phone]  Tasha Hawley calling regarding Appointment Access  (message) for # pt is calling to schedule an appt with provider she is having trouble with her esophagus pt is asking if she can be seen asap she is in so much pain  pls advise           "

## 2024-07-31 NOTE — TELEPHONE ENCOUNTER
MA spoke with patient. Mrs. Hawley is aware she will receive a call from our endoscopy nurse to schedule an EGD. Patient verbalized understanding.

## 2024-07-31 NOTE — TELEPHONE ENCOUNTER
----- Message from Kamila Bass sent at 7/31/2024  2:33 PM CDT -----  Regarding: Appt  Contact: [phone]  Tasha Hawley calling regarding Appointment Access  (message) for # pt is calling to schedule an appt with provider she is having trouble with her esophagus pt is asking if she can be seen asap she is in so much pain  pls advise

## 2024-07-31 NOTE — TELEPHONE ENCOUNTER
"Patient is calling with complaints of trouble swallowing (both solid and liquids). Per patient, "At times, it feels like the food is stuck in my throat or feels like my throat is trying to close up".     Patient was informed to go to the nearest ED if she is experiencing any of these symptoms. Mrs. Hawley verbalized underderstanding, but stated, she is still able to swallow without food getting stuck at this time.     Dr. Cortés is currently out of the office and informed the patient a message would be sent to Dr. Vance for recommendations.    "

## 2024-08-05 ENCOUNTER — PATIENT MESSAGE (OUTPATIENT)
Dept: ENDOSCOPY | Facility: HOSPITAL | Age: 69
End: 2024-08-05
Payer: MEDICARE

## 2024-08-05 ENCOUNTER — TELEPHONE (OUTPATIENT)
Dept: ENDOSCOPY | Facility: HOSPITAL | Age: 69
End: 2024-08-05
Payer: MEDICARE

## 2024-08-05 ENCOUNTER — TELEPHONE (OUTPATIENT)
Dept: WOUND CARE | Facility: HOSPITAL | Age: 69
End: 2024-08-05
Payer: MEDICARE

## 2024-08-06 ENCOUNTER — OUTPATIENT CASE MANAGEMENT (OUTPATIENT)
Dept: ADMINISTRATIVE | Facility: OTHER | Age: 69
End: 2024-08-06
Payer: MEDICARE

## 2024-08-07 ENCOUNTER — TELEPHONE (OUTPATIENT)
Dept: UROLOGY | Facility: CLINIC | Age: 69
End: 2024-08-07
Payer: MEDICARE

## 2024-08-07 ENCOUNTER — OUTPATIENT CASE MANAGEMENT (OUTPATIENT)
Dept: ADMINISTRATIVE | Facility: OTHER | Age: 69
End: 2024-08-07
Payer: MEDICARE

## 2024-08-07 ENCOUNTER — TELEPHONE (OUTPATIENT)
Dept: ENDOSCOPY | Facility: HOSPITAL | Age: 69
End: 2024-08-07
Payer: MEDICARE

## 2024-08-07 NOTE — TELEPHONE ENCOUNTER
"----- Message from Caitlin Ordaz RN sent at 8/7/2024  3:00 PM CDT -----  Regarding: Pt c/o fungal rash  Dr. Mayo,     Mrs. Hawley says she has a "yeast infection to her abd, legs, between buttocks with drainage- yellowish and odor.     She feels it is something she has had in the past the [facility] prescribed medication (Diflucan) that helped in the past.     Please advise Mrs. Hawley. She will be preparing for EGD 8/12 and [facility] Surg 8/13.     Thank you,  ADOLPH Pruett, RN, CCM [facility]  [email or web address]  TEL:  [phone]  "

## 2024-08-09 ENCOUNTER — TELEPHONE (OUTPATIENT)
Dept: GASTROENTEROLOGY | Facility: CLINIC | Age: 69
End: 2024-08-09
Payer: MEDICARE

## 2024-08-12 ENCOUNTER — ANESTHESIA (OUTPATIENT)
Dept: ENDOSCOPY | Facility: HOSPITAL | Age: 69
End: 2024-08-12
Payer: MEDICARE

## 2024-08-12 ENCOUNTER — HOSPITAL ENCOUNTER (OUTPATIENT)
Facility: HOSPITAL | Age: 69
Discharge: HOME | End: 2024-08-12
Attending: INTERNAL MEDICINE | Admitting: INTERNAL MEDICINE
Payer: MEDICARE

## 2024-08-12 ENCOUNTER — TELEPHONE (OUTPATIENT)
Dept: ENDOSCOPY | Facility: HOSPITAL | Age: 69
End: 2024-08-12
Payer: MEDICARE

## 2024-08-12 ENCOUNTER — TELEPHONE (OUTPATIENT)
Dept: UROLOGY | Facility: CLINIC | Age: 69
End: 2024-08-12
Payer: MEDICARE

## 2024-08-12 ENCOUNTER — ANESTHESIA EVENT (OUTPATIENT)
Dept: ENDOSCOPY | Facility: HOSPITAL | Age: 69
End: 2024-08-12
Payer: MEDICARE

## 2024-08-12 ENCOUNTER — PATIENT MESSAGE (OUTPATIENT)
Dept: UROLOGY | Facility: CLINIC | Age: 69
End: 2024-08-12
Payer: MEDICARE

## 2024-08-12 VITALS
DIASTOLIC BLOOD PRESSURE: 56 MMHG | SYSTOLIC BLOOD PRESSURE: 115 MMHG | HEART RATE: 78 BPM | HEIGHT: 67 IN | RESPIRATION RATE: 20 BRPM | OXYGEN SATURATION: 98 % | WEIGHT: 240 LBS | BODY MASS INDEX: 37.67 KG/M2 | TEMPERATURE: 98 F

## 2024-08-12 DIAGNOSIS — R13.10 DYSPHAGIA: ICD-10-CM

## 2024-08-12 DIAGNOSIS — R13.10 DYSPHAGIA, UNSPECIFIED TYPE: Primary | ICD-10-CM

## 2024-08-12 PROCEDURE — 43239 EGD BIOPSY SINGLE/MULTIPLE: CPT | Mod: ,,, | Performed by: INTERNAL MEDICINE

## 2024-08-12 PROCEDURE — 25000003 PHARM REV CODE 250: Performed by: NURSE ANESTHETIST, CERTIFIED REGISTERED

## 2024-08-12 PROCEDURE — 88305 TISSUE EXAM BY PATHOLOGIST: CPT | Mod: 59 | Performed by: PATHOLOGY

## 2024-08-12 PROCEDURE — 88305 TISSUE EXAM BY PATHOLOGIST: CPT | Mod: 26,,, | Performed by: PATHOLOGY

## 2024-08-12 PROCEDURE — 43239 EGD BIOPSY SINGLE/MULTIPLE: CPT | Performed by: INTERNAL MEDICINE

## 2024-08-12 PROCEDURE — 27201012 HC FORCEPS, HOT/COLD, DISP: Performed by: INTERNAL MEDICINE

## 2024-08-12 PROCEDURE — 63600175 PHARM REV CODE 636 W HCPCS: Performed by: NURSE ANESTHETIST, CERTIFIED REGISTERED

## 2024-08-12 PROCEDURE — 25000003 PHARM REV CODE 250: Performed by: INTERNAL MEDICINE

## 2024-08-12 PROCEDURE — 37000008 HC ANESTHESIA 1ST 15 MINUTES: Performed by: INTERNAL MEDICINE

## 2024-08-12 PROCEDURE — 37000009 HC ANESTHESIA EA ADD 15 MINS: Performed by: INTERNAL MEDICINE

## 2024-08-12 RX ORDER — LIDOCAINE HYDROCHLORIDE 20 MG/ML
INJECTION INTRAVENOUS
Status: DISCONTINUED | OUTPATIENT
Start: 2024-08-12 | End: 2024-08-12

## 2024-08-12 RX ORDER — SODIUM CHLORIDE 9 MG/ML
INJECTION, SOLUTION INTRAVENOUS CONTINUOUS
Status: DISCONTINUED | OUTPATIENT
Start: 2024-08-12 | End: 2024-08-12 | Stop reason: HOSPADM

## 2024-08-12 RX ORDER — GLUCAGON 1 MG
1 KIT INJECTION
Status: DISCONTINUED | OUTPATIENT
Start: 2024-08-12 | End: 2024-08-12 | Stop reason: HOSPADM

## 2024-08-12 RX ORDER — PROPOFOL 10 MG/ML
VIAL (ML) INTRAVENOUS CONTINUOUS PRN
Status: DISCONTINUED | OUTPATIENT
Start: 2024-08-12 | End: 2024-08-12

## 2024-08-12 RX ORDER — PROCHLORPERAZINE EDISYLATE 5 MG/ML
5 INJECTION INTRAMUSCULAR; INTRAVENOUS EVERY 30 MIN PRN
Status: DISCONTINUED | OUTPATIENT
Start: 2024-08-12 | End: 2024-08-12 | Stop reason: HOSPADM

## 2024-08-12 RX ORDER — PROPOFOL 10 MG/ML
VIAL (ML) INTRAVENOUS
Status: DISCONTINUED | OUTPATIENT
Start: 2024-08-12 | End: 2024-08-12

## 2024-08-12 RX ORDER — SODIUM CHLORIDE 0.9 % (FLUSH) 0.9 %
3 SYRINGE (ML) INJECTION
Status: DISCONTINUED | OUTPATIENT
Start: 2024-08-12 | End: 2024-08-12 | Stop reason: HOSPADM

## 2024-08-12 RX ADMIN — PROPOFOL 70 MG: 10 INJECTION, EMULSION INTRAVENOUS at 03:08

## 2024-08-12 RX ADMIN — SODIUM CHLORIDE: 0.9 INJECTION, SOLUTION INTRAVENOUS at 03:08

## 2024-08-12 RX ADMIN — PROPOFOL 175 MCG/KG/MIN: 10 INJECTION, EMULSION INTRAVENOUS at 03:08

## 2024-08-12 RX ADMIN — LIDOCAINE HYDROCHLORIDE 80 MG: 20 INJECTION INTRAVENOUS at 03:08

## 2024-08-12 NOTE — ADDENDUM NOTE
Addendum  created 08/12/24 1712 by Raegan Gutierrez CRNA    Clinical Note Signed, Intraprocedure Meds edited

## 2024-08-12 NOTE — ANESTHESIA POSTPROCEDURE EVALUATION
Anesthesia Post Evaluation    Patient: Tasha Hawley    Procedure(s) Performed: Procedure(s) (LRB):  EGD (ESOPHAGOGASTRODUODENOSCOPY) (N/A)    Final Anesthesia Type: general      Patient location during evaluation: [facility]  Patient participation: Yes- Able to Participate  Level of consciousness: awake and alert and oriented  Post-procedure vital signs: reviewed and stable  Pain management: adequate  Airway patency: patent  CECI mitigation strategies: Multimodal analgesia  PONV status at discharge: No PONV  Anesthetic complications: no      Cardiovascular status: blood pressure returned to baseline and hemodynamically stable  Respiratory status: unassisted, spontaneous ventilation and room air  Hydration status: euvolemic  Follow-up not needed.              Vitals Value Taken Time   /53 08/12/24 1610   Temp 36.7 °C (98.1 °F) 08/12/24 1610   Pulse 77 08/12/24 1636   Resp 20 08/12/24 1610   SpO2 95 % 08/12/24 1636   Vitals shown include unfiled device data.      No case tracking events are documented in the log.      Pain/Low Score: Low Score: 9 (8/12/2024  4:15 PM)

## 2024-08-12 NOTE — TELEPHONE ENCOUNTER
I called the patient to let her know that Dr Mayo stated I was to cancel her surgery due to the patient not coming in to get her cath changed on several different occasions. No answer. I left a Vm for the patient with these details and my direct contact number.

## 2024-08-12 NOTE — TELEPHONE ENCOUNTER
Good Morning. Your surgery will start at 7:00 am, but your arrival time is 5:30  am.   It will be on the first floor [facility] behind the [facility].  You will need for someone to drive you home because you will be under anaesthesia.  No eating or drinking after Midnight. Take a shower the night before and the morning of with a anti-bacterial soap, ( Dial Soap/ Hibiclens) Do not apply and deodorant, perfume, powder or body lotions the morning of surgery. Wear comfortable clothing, like loose fitting pants and a button down shirt. Leave all jewelry and valuables at home.   If you have any questions don't hesitate to call me. Shireen [phone]

## 2024-08-12 NOTE — PROVATION PATIENT INSTRUCTIONS
Discharge Summary/Instructions after an Endoscopic Procedure  Patient Name: Tasha Hawley  Patient MRN: [id number]  Patient YOB: 1955 Monday, August 12, 2024  Cat Cortés MD  Dear patient,  As a result of recent federal legislation (The Federal Cures Act), you may   receive lab or pathology results from your procedure in your [facility]   account before your physician is able to contact you. Your physician or   their representative will relay the results to you with their   recommendations at their soonest availability.  Thank you,  RESTRICTIONS:  During your procedure today, you received medications for sedation.  These   medications may affect your judgment, balance and coordination.  Therefore,   for 24 hours, you have the following restrictions:   - DO NOT drive a car, operate machinery, make legal/financial decisions,   sign important papers or drink alcohol.    ACTIVITY:  Today: no heavy lifting, straining or running due to procedural   sedation/anesthesia.  The following day: return to full activity including work.  DIET:  Eat and drink normally unless instructed otherwise.     TREATMENT FOR COMMON SIDE EFFECTS:  - Mild abdominal pain, nausea, belching, bloating or excessive gas:  rest,   eat lightly and use a heating pad.  - Sore Throat: treat with throat lozenges and/or gargle with warm salt   water.  - Because air was used during the procedure, expelling large amounts of air   from your rectum or belching is normal.  - If a bowel prep was taken, you may not have a bowel movement for 1-3 days.    This is normal.  SYMPTOMS TO WATCH FOR AND REPORT TO YOUR PHYSICIAN:  1. Abdominal pain or bloating, other than gas cramps.  2. Chest pain.  3. Back pain.  4. Signs of infection such as: chills or fever occurring within 24 hours   after the procedure.  5. Rectal bleeding, which would show as bright red, maroon, or black stools.   (A tablespoon of blood from the rectum is not serious, especially  if   hemorrhoids are present.)  6. Vomiting.  7. Weakness or dizziness.  GO DIRECTLY TO THE NEAREST EMERGENCY ROOM IF YOU HAVE ANY OF THE FOLLOWING:      Difficulty breathing              Chills and/or fever over 101 F   Persistent vomiting and/or vomiting blood   Severe abdominal pain   Severe chest pain   Black, tarry stools   Bleeding- more than one tablespoon   Any other symptom or condition that you feel may need urgent attention  Your doctor recommends these additional instructions:  If any biopsies were taken, your doctors clinic will contact you in 1 to 2   weeks with any results.  - Discharge patient to home.   - Resume previous diet.   - Continue present medications.   - Await pathology results.   - Esophageal manometry procedure ordered.  - ENT appt with Dr. Gallegos requested for the patient's voice changes.  For questions, problems or results please call your physician - Cat Cortés MD at Work:  [phone].  [facility] [place], EMERGENCY ROOM PHONE NUMBER: [phone]  IF A COMPLICATION OR EMERGENCY SITUATION ARISES AND YOU ARE UNABLE TO REACH   YOUR PHYSICIAN - GO DIRECTLY TO THE EMERGENCY ROOM.  Cat Cortés MD  8/12/2024 4:09:52 PM  This report has been verified and signed electronically.  Dear patient,  As a result of recent federal legislation (The Federal Cures Act), you may   receive lab or pathology results from your procedure in your [facility]   account before your physician is able to contact you. Your physician or   their representative will relay the results to you with their   recommendations at their soonest availability.  Thank you,  PROVATION

## 2024-08-12 NOTE — TRANSFER OF CARE
"Anesthesia Transfer of Care Note    Patient: Tasha Hawley    Procedure(s) Performed: Procedure(s) (LRB):  EGD (ESOPHAGOGASTRODUODENOSCOPY) (N/A)    Patient location: [facility]    Anesthesia Type: general    Transport from OR: Transported from OR on 2-3 L/min O2 by NC with adequate spontaneous ventilation    Post pain: adequate analgesia    Post assessment: no apparent anesthetic complications    Post vital signs: stable    Level of consciousness: awake    Nausea/Vomiting: no nausea/vomiting    Complications: none    Transfer of care protocol was followed      Last vitals: Visit Vitals  BP (!) 115/56 (BP Location: Right arm, Patient Position: Lying)   Pulse 78   Temp 36.7 °C (98.1 °F) (Temporal)   Resp 20   Ht 5' 7" (1.702 m)   Wt 108.9 kg (240 lb)   LMP  (LMP Unknown)   SpO2 98%   Breastfeeding No   BMI 37.59 kg/m²     "

## 2024-08-12 NOTE — TELEPHONE ENCOUNTER
----- Message from Fatmata Franks sent at 8/12/2024  8:35 AM CDT -----  Regarding: FW: Pt called states she need to know time of her Procedure  Contact: [phone]    ----- Message -----  From: Leonor Tobin  Sent: 8/9/2024   1:08 PM CDT  To: [facility] Endoscopy Schedulers; [facility] Staff  Subject: Pt called states she need to know time of he#    Name of Who is Calling:JUDIE YO [[id number]]        What is the request in detail:Pt called states she need to know time of her Procedure. Please advise         Can the clinic reply by [facility]:No        What Number to Call Back if not in [facility]: Telephone Information:  Mobile          [phone]

## 2024-08-12 NOTE — H&P
Short Stay Endoscopy History and Physical    PCP - Mesfin Hodges II, MD     Procedure - EGD  ASA - per anesthesia  Mallampati - per anesthesia  History of Anesthesia problems - no  Family history Anesthesia problems -  no   Plan of anesthesia - General    HPI:  This is a 68 y.o. female here for evaluation of : Dysphagia    ROS:  Constitutional: No fevers, chills, No weight loss  CV: No chest pain  Pulm: No cough, No shortness of breath  Ophtho: No vision changes  GI: see HPI  Derm: No rash    Medical History:  has a past medical history of Anxiety, Arthritis, Bilateral lower extremity edema, Carotid artery occlusion, Cataract, CHF (congestive heart failure), Coronary artery disease, Depression, Fever blister, Hard of hearing, Hypokalemia (01/09/2023), Hyponatremia (02/04/2022), Hypothyroid, Iron deficiency anemia, Lumbar radiculopathy, Ocular migraine, Other emphysema (05/22/2023), Renal disorder, and Sleep apnea.    Surgical History:  has a past surgical history that includes Hysterectomy (1975); Back surgery; pain pump placement; Spine surgery (5-13-13); Cataract extraction w/  intraocular lens implant (Left); Spinal cord stimulator removal; Esophagogastroduodenoscopy (N/A, 5/23/2018); Tonsillectomy; Adenoidectomy; Cardiac catheterization (2016); Cystoscopic litholapaxy (N/A, 6/27/2019); Cystoscopic litholapaxy (N/A, 9/3/2019); Replacement of catheter (N/A, 10/31/2019); Cystoscopy (N/A, 7/13/2021); Injection of botulinum toxin type A (7/13/2021); Cystoscopy (11/16/2021); Injection of botulinum toxin type A (11/16/2021); Cystoscopy (7/19/2022); Cystoscopy with injection of periurethral bulking agent (7/19/2022); Injection of botulinum toxin type A (7/19/2022); Removal of bone spur of foot (Bilateral, 9/16/2022); cystoscopy,with botulinum toxin injection (N/A, 12/13/2022); insertion, suprapubic catheter (N/A, 12/13/2022); cystoscopy,with botulinum toxin injection (N/A, 3/28/2023); Cystoscopy with injection  of periurethral bulking agent (N/A, 3/28/2023); Esophagogastroduodenoscopy (N/A, 6/23/2023); Replacement of baclofen pump (N/A, 8/2/2023); Injection of botulinum toxin type A (N/A, 11/14/2023); insertion, suprapubic catheter (N/A, 11/14/2023); and Cystoscopy with injection of periurethral bulking agent (N/A, 11/14/2023).    Family History: family history includes Cancer in her father; Cancer (age of onset: 55) in her mother; Cirrhosis in her paternal aunt and paternal uncle; Colon cancer in her maternal uncle; Esophageal cancer in her father; Heart disease in her maternal uncle; Liver disease in her paternal aunt and paternal uncle; No Known Problems in her brother, brother, maternal aunt, maternal grandfather, paternal grandfather, paternal grandmother, and sister; Parkinsonism in her maternal grandmother; Tremor in her maternal grandmother.. Otherwise no colon cancer, inflammatory bowel disease, or GI malignancies.    Social History:  reports that she has never smoked. She has never used smokeless tobacco. She reports that she does not currently use alcohol. She reports current drug use. Drug: Marijuana.    Review of patient's allergies indicates:   Allergen Reactions    (d)-limonene flavor      Other reaction(s): difficult intubation  Other reaction(s): Difficulty breathing    Benadryl [diphenhydramine hcl] Shortness Of Breath    Fentanyl Itching, Nausea And Vomiting and Swelling             Imitrex [sumatriptan succinate] Shortness Of Breath    Oxycodone-acetaminophen Shortness Of Breath    Sulfamethoxazole-trimethoprim Anaphylaxis    Topiramate Shortness Of Breath    Vancomycin Anaphylaxis     Rash    Butorphanol tartrate     Darvocet a500 [propoxyphene n-acetaminophen]      Other reaction(s): Difficulty breathing    Evening primrose (oenothera biennis)     Latex     Pregabalin Other (See Comments)     tremors    Sumatriptan     White petrolatum-zinc     Zinc acetate     Zinc oxide-white petrolatum      Other  reaction(s): Difficulty breathing    Latex, natural rubber Itching and Rash    Phenytoin Rash and Other (See Comments)     Trouble breathing       Medications:   Medications Prior to Admission   Medication Sig Dispense Refill Last Dose    amitriptyline (ELAVIL) 25 MG tablet Take 1 tablet (25 mg total) by mouth every evening. 90 tablet 0 8/11/2024    atorvastatin (LIPITOR) 80 MG tablet TAKE ONE TABLET BY MOUTH ONCE DAILY 90 tablet 3 Past Week    cyanocobalamin, vitamin B-12, 5,000 mcg Subl Place 1 tablet under the tongue once daily.   Past Week    darifenacin (ENABLEX) 7.5 MG Tb24 Take 1 tablet (7.5 mg total) by mouth once daily. 30 tablet 11 8/11/2024    DULoxetine (CYMBALTA) 60 MG capsule Take 1 capsule (60 mg total) by mouth 2 (two) times daily. 180 capsule 0 8/11/2024    furosemide (LASIX) 40 MG tablet Take 1 tablet (40 mg total) by mouth once daily. 90 tablet 1 8/11/2024    hydrocortisone (CORTEF) 10 MG Tab Take 1 tablet (10 mg total) by mouth once daily. 30 tablet 5 8/11/2024    levothyroxine (SYNTHROID) 150 MCG tablet Take 1 tablet (150 mcg total) by mouth before breakfast. 30 tablet 11 8/11/2024    pantoprazole (PROTONIX) 40 MG tablet Take 1 tablet (40 mg total) by mouth once daily. 90 tablet 0 8/11/2024    QUEtiapine (SEROQUEL) 50 MG tablet Take 1 tablet (50 mg total) by mouth every evening. 60 tablet 0 8/11/2024    tiotropium bromide (SPIRIVA RESPIMAT) 2.5 mcg/actuation inhaler Inhale 2 puffs into the lungs once daily. Controller. Please restart taking. 4 g 1 Past Week    traMADoL (ULTRAM) 50 mg tablet Take 50 mg by mouth every 12 (twelve) hours as needed for Pain.   8/11/2024    albuterol (PROVENTIL/VENTOLIN HFA) 90 mcg/actuation inhaler Inhale 2 puffs into the lungs every 6 (six) hours as needed for Shortness of Breath or Wheezing. Rescue 8.5 g 5     butalbital-acetaminophen-caffeine -40 mg (FIORICET, ESGIC) -40 mg per tablet Take 1 tablet by mouth daily as needed for Headaches. (Patient not  taking: Reported on 7/29/2024)       calcium-vitamin D3 (OS-JACKIE 500 + D3) 500 mg-5 mcg (200 unit) per tablet Take 2 tablets by mouth 2 (two) times daily. 120 tablet 11     docusate sodium (COLACE) 100 MG capsule Take 200 mg by mouth every evening.       hydrocortisone (CORTEF) 5 MG Tab Take 1 tablet (5 mg total) by mouth before dinner. 30 tablet 5     intrathecal pain pump compound Hydromorphone (7.5 mg/mL) infusion at 8.59 mg/day (0.3578 mg/hr) out of a total reservoir volume of 40 mL  Pump filled monthly       menthol (BIOFREEZE, MENTHOL,) 10 % Crea Apply topically as needed.       naproxen (NAPROSYN) 500 MG tablet Take 1 tablet (500 mg total) by mouth 2 (two) times daily with meals. 30 tablet 0     neomycin-polymyxin-hydrocortisone (CORTISPORIN) 3.5-10,000-1 mg/mL-unit/mL-% otic suspension Place 3 drops into both ears 4 (four) times daily. 10 mL 0     nitroGLYCERIN (NITROSTAT) 0.4 MG SL tablet Place 0.4 mg under the tongue every 5 (five) minutes as needed for Chest pain.       senna (SENNA LAX) 8.6 mg tablet Take 2 tablets by mouth 2 (two) times daily.       tobramycin-dexAMETHasone 0.3-0.1% (TOBRADEX) 0.3-0.1 % DrpS Place 1 drop into both eyes every 6 (six) hours while awake. 5 mL 0        Physical Exam:    Vital Signs:   Vitals:    08/12/24 1507   BP: (!) 120/57   Pulse: 72   Resp: 16   Temp: 97.5 °F (36.4 °C)       Gen: NAD, lying comfortably  HENT: NCAT, oropharynx clear  Eyes: anicteric sclerae, EOMI grossly  Neck: supple, no visible masses/goiter  Cardiac: RRR  Lungs: non-labored breathing  Abd: soft, NT/ND, normoactive BS  Ext: no LE edema, warm, well perfused  Skin: skin intact on exposed body parts, no visible rashes, lesions  Neuro: A&Ox4, neuro exam grossly intact, moves all extremities  Psych: appropriate mood, affect      Labs:  Lab Results   Component Value Date    WBC 5.71 07/18/2024    HGB 10.7 (L) 07/18/2024    HCT 33.7 (L) 07/18/2024     07/18/2024    CHOL 122 05/22/2023    TRIG 73  05/22/2023    HDL 47 05/22/2023    ALT 7 (L) 07/18/2024    AST 11 07/18/2024     07/18/2024    K 3.9 07/18/2024     07/18/2024    CREATININE 1.0 07/18/2024    BUN 14 07/18/2024    CO2 27 07/18/2024    TSH 5.888 (H) 07/01/2024    INR 1.9 (H) 08/02/2023    HGBA1C 5.3 05/03/2024       Plan:  EGD for dysphagia.    I have explained the risks and benefits of endoscopy procedures to the patient including but not limited to bleeding, perforation, infection, and death.  The patient was asked if they understand and allowed to ask any further questions to their satisfaction.      Cat Cortés MD

## 2024-08-13 ENCOUNTER — TELEPHONE (OUTPATIENT)
Dept: UROLOGY | Facility: CLINIC | Age: 69
End: 2024-08-13
Payer: MEDICARE

## 2024-08-13 ENCOUNTER — TELEPHONE (OUTPATIENT)
Dept: GASTROENTEROLOGY | Facility: CLINIC | Age: 69
End: 2024-08-13
Payer: MEDICARE

## 2024-08-13 ENCOUNTER — PATIENT MESSAGE (OUTPATIENT)
Dept: OTOLARYNGOLOGY | Facility: CLINIC | Age: 69
End: 2024-08-13
Payer: MEDICARE

## 2024-08-13 NOTE — TELEPHONE ENCOUNTER
The patient called and wanted t know why she couldn't have her surgery. I called and left a voice mal for her yesterday stating it was cancelled due to her canceling the appt for a cath change on 3 different occasions. I told her she has a appt coming up for a cath change that she needs to go to. She stated she will go there to get it changed. She then asked me if Dr Mayo will do her surgery after the appt. I let her know that would be up to Dr Mayo.

## 2024-08-15 ENCOUNTER — TELEPHONE (OUTPATIENT)
Dept: ENDOSCOPY | Facility: HOSPITAL | Age: 69
End: 2024-08-15
Payer: MEDICARE

## 2024-08-15 ENCOUNTER — TELEPHONE (OUTPATIENT)
Dept: INTERNAL MEDICINE | Facility: CLINIC | Age: 69
End: 2024-08-15
Payer: MEDICARE

## 2024-08-15 VITALS — HEIGHT: 66 IN | BODY MASS INDEX: 38.58 KG/M2 | WEIGHT: 240.06 LBS

## 2024-08-15 DIAGNOSIS — R23.8 SKIN BREAKDOWN: Primary | ICD-10-CM

## 2024-08-15 NOTE — TELEPHONE ENCOUNTER
Spoke to Tasha to schedule procedure(s) Esophageal Manometry       Physician to perform procedure(s) Dr. NIEVES Cortés   Date of Procedure (s) 9/16/24  Arrival Time 9:00 AM  Time of Procedure(s) 10:00 AM   Location of Procedure(s) [facility] [address] [facility]  Type of Rx Prep sent to patient: Other  Instructions provided to patient via [facility]    Patient was informed on the following information and verbalized understanding. Screening questionnaire reviewed with patient and complete. No ride arrangements are required for this procedure.   Appointment details are tentative, especially check-in time. Patient will receive a prep-op call 7 days prior to confirm check-in time for procedure. If applicable the patient should contact their pharmacy to verify Rx for procedure prep is ready for pick-up. Patient was advised to call the scheduling department at [phone] if pharmacy states no Rx is available. Patient was advised to call the endoscopy scheduling department if any questions or concerns arise.       Endoscopy Scheduling Department

## 2024-08-15 NOTE — TELEPHONE ENCOUNTER
Please call her.  I put in a referral in May.  Canceled that and placed another today.  If there's a delay in getting that evaluation, I can send her to podiatry.    If the wound is bad, she should come to the ED

## 2024-08-15 NOTE — TELEPHONE ENCOUNTER
----- Message from Cat Cortés MD sent at 8/12/2024  3:43 PM CDT -----  Regarding: Esophageal manometry  [facility],    Can you schedule this patient for esophageal manometry with dysphagia and rumination protocol?  Thanks!    Cat

## 2024-08-15 NOTE — TELEPHONE ENCOUNTER
----- Message from Nancy Lama sent at 8/15/2024 10:14 AM CDT -----  Contact: Self/ [phone]  Patient would like to get a referral.  Referral to what specialty:  Wound care   Does the patient want the referral with a specific physician:  No  [facility] the [facility]   Reason (be specific):  bottom off foot has an open wound   Does the patient already have the specialty clinic appointment scheduled:  No  If yes, what date is the appointment scheduled:     Is the insurance listed in Epic correct? (this is important for a referral):  Yes  Advised patient that once provider approves this either a nurse or  will return their call?: yes  Would the patient like a call back, or a response through their [facility] portal?:   call back   Comments:

## 2024-08-19 ENCOUNTER — HOSPITAL ENCOUNTER (OUTPATIENT)
Dept: WOUND CARE | Facility: HOSPITAL | Age: 69
Discharge: HOME | End: 2024-08-19
Attending: PREVENTIVE MEDICINE
Payer: MEDICARE

## 2024-08-19 ENCOUNTER — TELEPHONE (OUTPATIENT)
Dept: UROLOGY | Facility: CLINIC | Age: 69
End: 2024-08-19
Payer: MEDICARE

## 2024-08-19 ENCOUNTER — HOSPITAL ENCOUNTER (OUTPATIENT)
Dept: RADIOLOGY | Facility: HOSPITAL | Age: 69
Discharge: HOME | End: 2024-08-19
Attending: PREVENTIVE MEDICINE
Payer: MEDICARE

## 2024-08-19 ENCOUNTER — NURSE TRIAGE (OUTPATIENT)
Dept: ADMINISTRATIVE | Facility: CLINIC | Age: 69
End: 2024-08-19
Payer: MEDICARE

## 2024-08-19 ENCOUNTER — PATIENT MESSAGE (OUTPATIENT)
Dept: UROLOGY | Facility: CLINIC | Age: 69
End: 2024-08-19
Payer: MEDICARE

## 2024-08-19 VITALS
WEIGHT: 240.06 LBS | HEART RATE: 85 BPM | SYSTOLIC BLOOD PRESSURE: 117 MMHG | DIASTOLIC BLOOD PRESSURE: 54 MMHG | TEMPERATURE: 98 F | BODY MASS INDEX: 40 KG/M2 | HEIGHT: 65 IN

## 2024-08-19 DIAGNOSIS — M21.42 BILATERAL PES PLANUS: ICD-10-CM

## 2024-08-19 DIAGNOSIS — R23.8 SKIN BREAKDOWN: ICD-10-CM

## 2024-08-19 DIAGNOSIS — L97.522 CHRONIC ULCER OF LEFT FOOT WITH FAT LAYER EXPOSED: ICD-10-CM

## 2024-08-19 DIAGNOSIS — L97.511 CHRONIC ULCER OF RIGHT FOOT LIMITED TO BREAKDOWN OF SKIN: ICD-10-CM

## 2024-08-19 DIAGNOSIS — M21.41 BILATERAL PES PLANUS: ICD-10-CM

## 2024-08-19 DIAGNOSIS — L97.522 CHRONIC ULCER OF LEFT FOOT WITH FAT LAYER EXPOSED: Primary | ICD-10-CM

## 2024-08-19 DIAGNOSIS — I89.0 LYMPHEDEMA OF BOTH LOWER EXTREMITIES: ICD-10-CM

## 2024-08-19 LAB
FINAL PATHOLOGIC DIAGNOSIS: NORMAL
GROSS: NORMAL
Lab: NORMAL

## 2024-08-19 PROCEDURE — 99204 OFFICE O/P NEW MOD 45 MIN: CPT | Mod: 25

## 2024-08-19 PROCEDURE — 73630 X-RAY EXAM OF FOOT: CPT | Mod: 26,RT,, | Performed by: RADIOLOGY

## 2024-08-19 PROCEDURE — 73630 X-RAY EXAM OF FOOT: CPT | Mod: TC,FY,RT

## 2024-08-19 PROCEDURE — 73630 X-RAY EXAM OF FOOT: CPT | Mod: TC,FY,LT

## 2024-08-19 RX ORDER — LEVOFLOXACIN 500 MG/1
500 TABLET, FILM COATED ORAL DAILY
Qty: 10 TABLET | Refills: 0 | Status: SHIPPED | OUTPATIENT
Start: 2024-08-19 | End: 2024-08-29

## 2024-08-19 NOTE — PROGRESS NOTES
Wound Care & Hyperbaric Medicine    Subjective:       Patient ID: Tasha Hawley is a 68 y.o. female.    Chief Complaint: Non-healing Wound    Patient seen for bilateral plantar foot wounds. Reported significant pain at sites. Unable to tolerate any debridement. Significant lymphedema on BLE also appreciated.  Educated on need to elevate legs as much and as often as she can. Stated that she used to have a recliner chair but it broke recently.    Review of Systems   All other systems reviewed and are negative.        Objective:     Vitals:    08/19/24 1332   BP: (!) 117/54   Pulse: 85   Temp: 98 °F (36.7 °C)         Physical Exam       Wound 05/29/24 1448 Non pressure chronic ulcer Left plantar Foot (Active)   05/29/24 1448   Present on Original Admission: N   Primary Wound Type: Non pressure   Side: Left   Orientation: plantar   Location: Foot   Wound Approximate Age at First Assessment (Weeks):    Wound Number:    Is this injury device related?:    Incision Type:    Closure Method:    Wound Description (Comments):    Type:    Additional Comments:    Ankle-Brachial Index:    Pulses:    Removal Indication and Assessment:    Wound Outcome:    Wound Image    08/19/24 1420   Dressing Appearance Moist drainage 08/19/24 1420   Drainage Amount Moderate 08/19/24 1420   Drainage Characteristics/Odor Serosanguineous 08/19/24 1420   Appearance Slough;Pink;Tan 08/19/24 1420   Tissue loss description Full thickness 08/19/24 1420   Red (%), Wound Tissue Color 10 % 08/19/24 1420   Yellow (%), Wound Tissue Color 90 % 08/19/24 1420   Periwound Area Dry;Intact 08/19/24 1420   Wound Edges Open 08/19/24 1420   Wound Length (cm) 1 cm 08/19/24 1420   Wound Width (cm) 1.4 cm 08/19/24 1420   Wound Depth (cm) 0.1 cm 08/19/24 1420   Wound Volume (cm^3) 0.14 cm^3 08/19/24 1420   Wound Surface Area (cm^2) 1.4 cm^2 08/19/24 1420   Care Cleansed with:;Soap and water 08/19/24 1420   Dressing Applied;Hydrofiber;Absorptive  Pad 08/19/24 1420   Compression Tubular elasticized bandage 08/19/24 1420   Off Loading Football dressing 08/19/24 1420   Dressing Change Due 08/26/24 08/19/24 1420            Wound 08/19/24 1421 Non pressure chronic ulcer Right plantar Foot (Active)   08/19/24 1421   Present on Original Admission:    Primary Wound Type: Non pressure   Side: Right   Orientation: plantar   Location: Foot   Wound Approximate Age at First Assessment (Weeks):    Wound Number:    Is this injury device related?:    Incision Type:    Closure Method:    Wound Description (Comments):    Type:    Additional Comments:    Ankle-Brachial Index:    Pulses:    Removal Indication and Assessment:    Wound Outcome:    Wound Image    08/19/24 1420   Dressing Appearance Dry;Intact;Open to air 08/19/24 1420   Drainage Amount None 08/19/24 1420   Appearance Maroon;Dry;Intact 08/19/24 1420   Wound Edges Rolled/closed 08/19/24 1420   Wound Length (cm) 1.5 cm 08/19/24 1420   Wound Width (cm) 2 cm 08/19/24 1420   Wound Depth (cm) 0 cm 08/19/24 1420   Wound Volume (cm^3) 0 cm^3 08/19/24 1420   Wound Surface Area (cm^2) 3 cm^2 08/19/24 1420   Care Cleansed with:;Soap and water 08/19/24 1420   Dressing Applied;Foam 08/19/24 1420   Compression Tubular elasticized bandage 08/19/24 1420   Off Loading Football dressing 08/19/24 1420   Dressing Change Due 08/26/24 08/19/24 1420         Assessment/Plan:         ICD-10-CM ICD-9-CM   1. Chronic ulcer of left foot with fat layer exposed  L97.522 707.15   2. Chronic ulcer of right foot limited to breakdown of skin  L97.511 707.15   3. Bilateral pes planus  M21.41 734    M21.42    4. Lymphedema of both lower extremities  I89.0 457.1   5. Skin breakdown  R23.8 782.9           Tissue pathology and/or culture taken:  [] Yes [x] No   Sharp debridement performed:   [] Yes [x] No   Labs ordered this visit:   [] Yes [x] No   Imaging ordered this visit:   [] Yes [x] No           Orders Placed This Encounter   Procedures    X-Ray  Foot Complete 3 view Left     Standing Status:   Future     Number of Occurrences:   1     Standing Expiration Date:   2025     Order Specific Question:   May the Radiologist modify the order per protocol to meet the clinical needs of the patient?     Answer:   Yes     Order Specific Question:   Release to patient     Answer:   Immediate    X-Ray Foot Complete Right     Standing Status:   Future     Number of Occurrences:   1     Standing Expiration Date:   2025     Order Specific Question:   Reason for Exam:     Answer:   foot wound    Ambulatory referral/consult to Wound Clinic     Standing Status:   Standing     Number of Occurrences:   1     Referral Priority:   Routine     Referral Type:   Consultation     Referral Reason:   Specialty Services Required     Requested Specialty:   Wound Care     Number of Visits Requested:   1    Change dressing     Left plantar foot   Cleanse wound with: Vashe  Lidocaine:prn  Silver nitrate:prn  Periwound care:prn  Primary dressing: moist Hydrofera Classic  Secondary dressin ABD  Offloading: football dressing with 2 cast padding    Right plantar foot   Cleanse with: water and soap  Lidocaine:prn  Silver nitrate:prn  Periwound care:prn  Primary dressin ABD or 2 foams  Offloading: football dressing with 2 cast padding    Edema control: Tubi size G  Frequency: weekly until home health started then 2 x week  Follow-up: 1 week Dr Wylie  Home Health: please provide skilled wound care for patient as above 2 x week when not seen in clinic, weekly when seen in clinic.  Other Orders: x-ray for bilateral feet; Levaquin escribed to pharmacy.        Follow up in about 1 week (around 2024) for DR Gonzalez.            This includes face to face time and non-face to face time preparing to see the patient (eg, review of tests), obtaining and/or reviewing separately obtained history, documenting clinical information in the electronic or other health record, independently  interpreting results and communicating results to the patient/family/caregiver, or care coordinator.

## 2024-08-19 NOTE — TELEPHONE ENCOUNTER
Called, got message and was not able to leave a verbal message d/t their mailbox being full.    ----- Message from Mariangel Silva MA sent at 8/19/2024 11:21 AM CDT -----  Regarding: FW: pt advice  Contact: [phone]    ----- Message -----  From: Kristen Bray LPN  Sent: 8/16/2024   4:17 PM CDT  To: Mariangel Silva MA  Subject: FW: pt advice                                      ----- Message -----  From: Swetha Hernandez  Sent: 8/16/2024   2:11 PM CDT  To: [facility] Staff  Subject: pt advice                                        Pt asking to speak to Christi. Pls call to better discuss.

## 2024-08-19 NOTE — TELEPHONE ENCOUNTER
Reason for Disposition   No answer.  First attempt to contact caller.  Follow-up call scheduled within 15 minutes.    Additional Information   Negative: Caller has already spoken with the PCP (or office), and has no further questions   Negative: Caller has already spoken with another triager and has no further questions   Negative: Caller has already spoken with another triager or PCP (or office), and has further questions and triager able to answer questions.   Negative: Busy signal.  First attempt to contact caller.  Follow-up call scheduled within 15 minutes.    Protocols used: No Contact or Duplicate Contact Call-A-OH  Pt reported UTI symptoms to  . Unable to reach.

## 2024-08-20 ENCOUNTER — TELEPHONE (OUTPATIENT)
Dept: WOUND CARE | Facility: HOSPITAL | Age: 69
End: 2024-08-20
Payer: MEDICARE

## 2024-08-20 NOTE — TELEPHONE ENCOUNTER
Patient accepted by [facility]. Nurse will go out on Thursday 8/22/24 to admit patient to home health services for wound care

## 2024-08-21 ENCOUNTER — CARE AT HOME (OUTPATIENT)
Dept: HOME HEALTH SERVICES | Facility: CLINIC | Age: 69
End: 2024-08-21
Payer: MEDICARE

## 2024-08-21 ENCOUNTER — TELEPHONE (OUTPATIENT)
Dept: UROLOGY | Facility: CLINIC | Age: 69
End: 2024-08-21
Payer: MEDICARE

## 2024-08-21 DIAGNOSIS — I50.32 CHRONIC DIASTOLIC HEART FAILURE: ICD-10-CM

## 2024-08-21 DIAGNOSIS — I89.0 LYMPHEDEMA OF BOTH LOWER EXTREMITIES: Primary | ICD-10-CM

## 2024-08-21 DIAGNOSIS — Z93.59 SUPRAPUBIC CATHETER: Chronic | ICD-10-CM

## 2024-08-21 NOTE — PROGRESS NOTES
[facility]  Medical Chronic Care Home Visit    Encounter Provider: Gracie Vega   PCP: Mesfin Hodges II, MD  Consult Requested By: No ref. provider found    HISTORY OF PRESENT ILLNESS      Patient ID: Tasha Hawley is a 68 y.o. female is being seen in the home due to physical debility that presents a taxing effort to leave the home, to mitigate high risk of hospital readmission and/or due to the limited availability of reliable or safe options for transportation to the point of access to the provider. Prior to treatment on this visit the chart was reviewed and patient verbal consent was obtained.    Chronic medical conditions synopsis:    Pt referred to [facility] for mobility issues, asked by case management to resume home visits  Pt has lymphedema and difficulty getting out of home.  She has PT in place for lymphedema and urology for SPT replacement.     She is found in her home with her sister present.  She reports she has urology appt next week for SPT change  She is getting to her appts      DECISION MAKING TODAY       Assessment & Plan:  1. Lymphedema of both lower extremities    2. Suprapubic catheter    3. Chronic diastolic heart failure       - Continue all medications, treatments and therapies as ordered.   - Follow all instructions, recommendations as discussed.  - Maintain Safety Precautions at all times.  - Attend all medical appointments as scheduled.  - For worsening symptoms: call Primary Care Physician or Nurse Practitioner.  - For emergencies, call 911 or immediately report to the nearest emergency room.            ENVIRONMENT OF CARE      Family and/or Caregiver present at visit?  Yes  Name of Caregiver:   History provided by: patient    4Ms for Medical Decision-Making in Older Adults    Last Completed EAWV: None    MOBILITY:  Get Up and Go:       No data to display              Activities of Daily Living:      10/24/2018    10:23 AM   Activities of Daily Living    Ambulation Assistance Required   Ambulation Assistance Walker (wheeled)   Dressing Assistance Required   Dressing Assistance Moderate   Number of people 1   Transfers Assistance Required   Transfers Assistance Moderate   Number of people 1   Toileting Continent of bladder   Feeding Independent   Cleaning home/Chores Assistance Required   Telephone use Independent   Shopping Assistance Required   Paying bills Assistance Required   Taking meds Independent     Whisper Test:      10/24/2018    10:19 AM   Whisper Test   Whisper Test N/A- Hearing Impairment     Disability Status:      10/24/2018    10:25 AM   Disability Status   Are you deaf or do you have serious difficulty hearing? Y   Are you blind or do you have serious difficulty seeing, even when wearing glasses? N   Because of a physical, mental, or emotional condition, do you have serious difficulty concentrating, remembering, or making decisions? Y   Do you have serious difficulty walking or climbing stairs? Y   Do you have difficulty dressing or bathing? Y   Because of a physical, mental, or emotional condition, do you have difficulty doing errands alone such as visiting a doctor's office or shopping? Y     Nutrition Screening:      10/24/2018    10:22 AM   Nutrition Screening   Has food intake declined over the past three months due to loss of appetite, digestive problems, chewing or swallowing difficulties? Moderate decrease in food intake   Involuntary weight loss during the last 3 months? Weight loss greater than 3 kg (6.6 pounds)   Mobility? Able to get out of bed/chair, but does not go out   Has the patient suffered psychological stress or acute disease in the past three months? Yes   Neuropsychological problems? No psychological problems   Body Mass Index (BMI)?  BMI 23 or greater   Screening Score 7    Screening Score: 0-7 Malnourished, 8-11 At Risk, 12-14 Normal    MENTATION:   Depression Patient Health Questionnaire:      6/21/2024     9:40 AM  "  Depression Patient Health Questionnaire   Over the last two weeks how often have you been bothered by little interest or pleasure in doing things Not at all   Over the last two weeks how often have you been bothered by feeling down, depressed or hopeless Not at all   PHQ-2 Total Score 0     Has Dementia Dx: No    Cognitive Function Screening:      10/24/2018    10:20 AM   Cognitive Function Screening   Mini-Cog 3 Minute Recall 3   Cognitive Function Screening 6     Cognitive Function Screening Total - Less than 4 = Abnormal,  Greater than or equal to 4 = Normal    MEDICATIONS:  High Risk Medications:  Total Active Medications: 4  amitriptyline - 25 MG  DULoxetine - 60 MG  QUEtiapine - 50 MG  traMADoL - 50 mg    WHAT MATTERS MOST:  Advance Care Planning   ACP Status:   Patient has had an ACP conversation  Living Will: Yes  Power of : No  LaPOST: No      Impression upon entering the home:  Physical Dwelling: single family home   Appearance of home environment: cleaniness: clean  Functional Status: moderate assistance  Mobility: ambulatory with device  Nutritional access: adequate intake and access  Home Health: No, and will be referred today   DME/Supplies: rolling walker         Does patient have an ostomy (ileostomy, colostomy, suprapubic catheter, nephrostomy tube, tracheostomy, PEG tube, pleurex catheter, cholecystostomy, etc)?   Were BPAs reviewed? No    Social History     Socioeconomic History    Marital status:    Tobacco Use    Smoking status: Never    Smokeless tobacco: Never   Substance and Sexual Activity    Alcohol use: Not Currently    Drug use: Yes     Types: Marijuana     Comment: "medical marijuana gummies"     Social Determinants of Health     Financial Resource Strain: Low Risk  (6/14/2024)    Overall Financial Resource Strain (CARDIA)     Difficulty of Paying Living Expenses: Not hard at all   Food Insecurity: No Food Insecurity (6/14/2024)    Hunger Vital Sign     Worried About " Running Out of Food in the Last Year: Never true     Ran Out of Food in the Last Year: Never true   Transportation Needs: Unmet Transportation Needs (6/14/2024)    TRANSPORTATION NEEDS     Transportation : Yes, it has kept me from medical appointments or from getting my medications.   Physical Activity: Inactive (6/14/2024)    Exercise Vital Sign     Days of Exercise per Week: 0 days     Minutes of Exercise per Session: 0 min   Stress: No Stress Concern Present (6/14/2024)    [employer] - Occupational Stress Questionnaire     Feeling of Stress : Not at all   Recent Concern: Stress - Stress Concern Present (3/19/2024)    [employer] - Occupational Stress Questionnaire     Feeling of Stress : To some extent   Housing Stability: Low Risk  (6/14/2024)    Housing Stability Vital Sign     Unable to Pay for Housing in the Last Year: No     Homeless in the Last Year: No         OBJECTIVE:     Vital Signs:  There were no vitals filed for this visit.    Review of Systems   Constitutional:  Positive for fatigue.   Cardiovascular:  Positive for leg swelling.   Musculoskeletal:  Positive for gait problem.       Physical Exam:  Physical Exam  Musculoskeletal:         General: Swelling present.   Neurological:      Mental Status: She is oriented to person, place, and time.   Psychiatric:         Behavior: Behavior normal.         Thought Content: Thought content normal.         Judgment: Judgment normal.         INSTRUCTIONS FOR PATIENT:     Scheduled Follow-up, Appts Reviewed with Modifications if Needed: No  Future Appointments   Date Time Provider Department Center   8/26/2024 11:40 AM Aspen Durán NP [facility] [place]   8/26/2024  1:00 PM Marlon Wylie MD [facility] WOUND [facility]   8/28/2024  2:15 PM Walter Wallace MD [facility]  [facility]   9/12/2024 11:00 AM Mesfin Hodges II, MD [facility] PCW   9/16/2024  9:00 AM Anais Frankel MD  [facility] DERM [place]   9/16/2024  9:20 AM Anais Frankel MD [facility] DERM [place]   9/18/2024 10:30 AM Juan A Madera MD [facility] PSYCH [facility]   10/7/2024  8:20 AM Qamar Gallegos MD [facility] VOI SHEILA [facility]   10/7/2024  9:00 AM Maricruz Luis, NP [facility] C3[facility] [place]   10/23/2024  9:30 AM Juan A Madera MD [facility] PSYCH [facility]   10/24/2024 11:00 AM Nick Chilel MD [facility] PULMSVC [facility]         Current Outpatient Medications:     albuterol (PROVENTIL/VENTOLIN HFA) 90 mcg/actuation inhaler, Inhale 2 puffs into the lungs every 6 (six) hours as needed for Shortness of Breath or Wheezing. Rescue, Disp: 8.5 g, Rfl: 5    amitriptyline (ELAVIL) 25 MG tablet, Take 1 tablet (25 mg total) by mouth every evening., Disp: 90 tablet, Rfl: 0    atorvastatin (LIPITOR) 80 MG tablet, TAKE ONE TABLET BY MOUTH ONCE DAILY, Disp: 90 tablet, Rfl: 3    calcium-vitamin D3 (OS-JACKIE 500 + D3) 500 mg-5 mcg (200 unit) per tablet, Take 2 tablets by mouth 2 (two) times daily., Disp: 120 tablet, Rfl: 11    cyanocobalamin, vitamin B-12, 5,000 mcg Subl, Place 1 tablet under the tongue once daily., Disp: , Rfl:     darifenacin (ENABLEX) 7.5 MG Tb24, Take 1 tablet (7.5 mg total) by mouth once daily., Disp: 30 tablet, Rfl: 11    docusate sodium (COLACE) 100 MG capsule, Take 200 mg by mouth every evening., Disp: , Rfl:     DULoxetine (CYMBALTA) 60 MG capsule, Take 1 capsule (60 mg total) by mouth 2 (two) times daily., Disp: 180 capsule, Rfl: 0    furosemide (LASIX) 40 MG tablet, Take 1 tablet (40 mg total) by mouth once daily., Disp: 90 tablet, Rfl: 1    hydrocortisone (CORTEF) 10 MG Tab, Take 1 tablet (10 mg total) by mouth once daily., Disp: 30 tablet, Rfl: 5    hydrocortisone (CORTEF) 5 MG Tab, Take 1 tablet (5 mg total) by mouth before dinner., Disp: 30 tablet, Rfl: 5    intrathecal pain pump compound, Hydromorphone (7.5 mg/mL) infusion at 8.59 mg/day (0.3578 mg/hr) out of a total reservoir volume of 40 mL Pump filled monthly, Disp: , Rfl:      levoFLOXacin (LEVAQUIN) 500 MG tablet, Take 1 tablet (500 mg total) by mouth once daily. for 10 days, Disp: 10 tablet, Rfl: 0    levothyroxine (SYNTHROID) 150 MCG tablet, Take 1 tablet (150 mcg total) by mouth before breakfast., Disp: 30 tablet, Rfl: 11    menthol (BIOFREEZE, MENTHOL,) 10 % Crea, Apply topically as needed., Disp: , Rfl:     naproxen (NAPROSYN) 500 MG tablet, Take 1 tablet (500 mg total) by mouth 2 (two) times daily with meals., Disp: 30 tablet, Rfl: 0    neomycin-polymyxin-hydrocortisone (CORTISPORIN) 3.5-10,000-1 mg/mL-unit/mL-% otic suspension, Place 3 drops into both ears 4 (four) times daily., Disp: 10 mL, Rfl: 0    nitroGLYCERIN (NITROSTAT) 0.4 MG SL tablet, Place 0.4 mg under the tongue every 5 (five) minutes as needed for Chest pain., Disp: , Rfl:     pantoprazole (PROTONIX) 40 MG tablet, Take 1 tablet (40 mg total) by mouth once daily., Disp: 90 tablet, Rfl: 0    QUEtiapine (SEROQUEL) 50 MG tablet, Take 1 tablet (50 mg total) by mouth every evening., Disp: 60 tablet, Rfl: 0    senna (SENNA LAX) 8.6 mg tablet, Take 2 tablets by mouth 2 (two) times daily., Disp: , Rfl:     tiotropium bromide (SPIRIVA RESPIMAT) 2.5 mcg/actuation inhaler, Inhale 2 puffs into the lungs once daily. Controller. Please restart taking., Disp: 4 g, Rfl: 1    tobramycin-dexAMETHasone 0.3-0.1% (TOBRADEX) 0.3-0.1 % DrpS, Place 1 drop into both eyes every 6 (six) hours while awake., Disp: 5 mL, Rfl: 0    traMADoL (ULTRAM) 50 mg tablet, Take 50 mg by mouth every 12 (twelve) hours as needed for Pain., Disp: , Rfl:     Medication Reconciliation:  Were medications changed during this appointment? No  Were medications in the home? Yes  Is the patient taking the medications as directed? Yes  Does the patient/caregiver understand the medications and changes? Yes  Does updated med list accurately reflects meds patient is currently taking? Yes    Signature: Gracie Vega NP

## 2024-08-21 NOTE — TELEPHONE ENCOUNTER
"I spoke with Ms. Hawley who was complaining about her catheter leaking and she needed botox.  I asked when she had her catheter changed to which she replied, "it's been a long time".  I explained the importance of having it changed monthly, she said she knew, she just hadn't had home health in a while.  I told her Dr. Mayo was in surgery so when she returned to clinic, I would see if I could schedule her sooner than her first available.  I asked her availability and she reassured me that there was no time constraint on her schedule with the exception that her son was having surgery next week.      ----- Message from Brianne Peterson sent at 8/21/2024  9:22 AM CDT -----  Regarding: call back  Contact: [phone]  Pt calling in requesting call back from nurse please call to discuss further  "

## 2024-08-22 ENCOUNTER — OUTPATIENT CASE MANAGEMENT (OUTPATIENT)
Dept: ADMINISTRATIVE | Facility: OTHER | Age: 69
End: 2024-08-22
Payer: MEDICARE

## 2024-08-23 PROBLEM — S92.411A DISPLACED FRACTURE OF PROXIMAL PHALANX OF RIGHT GREAT TOE, INITIAL ENCOUNTER FOR CLOSED FRACTURE: Status: RESOLVED | Noted: 2023-09-13 | Resolved: 2024-08-23

## 2024-08-23 PROBLEM — M25.511 ACUTE PAIN OF RIGHT SHOULDER: Status: RESOLVED | Noted: 2024-06-20 | Resolved: 2024-08-23

## 2024-08-26 ENCOUNTER — TELEPHONE (OUTPATIENT)
Dept: UROLOGY | Facility: CLINIC | Age: 69
End: 2024-08-26
Payer: MEDICARE

## 2024-08-26 ENCOUNTER — HOSPITAL ENCOUNTER (OUTPATIENT)
Dept: WOUND CARE | Facility: HOSPITAL | Age: 69
Discharge: HOME | End: 2024-08-26
Attending: PREVENTIVE MEDICINE
Payer: MEDICARE

## 2024-08-26 ENCOUNTER — TELEPHONE (OUTPATIENT)
Dept: INTERNAL MEDICINE | Facility: CLINIC | Age: 69
End: 2024-08-26
Payer: MEDICARE

## 2024-08-26 ENCOUNTER — TELEPHONE (OUTPATIENT)
Dept: WOUND CARE | Facility: HOSPITAL | Age: 69
End: 2024-08-26

## 2024-08-26 VITALS
BODY MASS INDEX: 40 KG/M2 | HEART RATE: 87 BPM | DIASTOLIC BLOOD PRESSURE: 54 MMHG | TEMPERATURE: 98 F | WEIGHT: 240.06 LBS | SYSTOLIC BLOOD PRESSURE: 121 MMHG | HEIGHT: 65 IN

## 2024-08-26 DIAGNOSIS — I89.0 LYMPHEDEMA OF BOTH LOWER EXTREMITIES: ICD-10-CM

## 2024-08-26 DIAGNOSIS — M21.41 BILATERAL PES PLANUS: ICD-10-CM

## 2024-08-26 DIAGNOSIS — L97.522 CHRONIC ULCER OF LEFT FOOT WITH FAT LAYER EXPOSED: Primary | ICD-10-CM

## 2024-08-26 DIAGNOSIS — M21.42 BILATERAL PES PLANUS: ICD-10-CM

## 2024-08-26 PROCEDURE — 29581 APPL MULTLAYER CMPRN SYS LEG: CPT

## 2024-08-26 NOTE — TELEPHONE ENCOUNTER
Returned call to  nurse (Shawna), who states pt catheter is full of sediment and is red around the site. Wanted to know if pt should have an appt for change in clinic or have her change it (verbal order needed). Notified that I could not give a verbal order since being notified that pt will need to seek care from another Urologist d/t non-compliance issues. Had an appt with NP today (8/26/24) at [facility], which was cancelled for this reason.     States she will call PCP office for order until pt can establish with another Urologist.

## 2024-08-26 NOTE — TELEPHONE ENCOUNTER
Please call home health.  If they are comfortable changing that out, they can do so.  Otherwise, she will need to establish care with a new urologist to manage this.  Her prior urologist will no longer see her because she kept missing appointments and not following advice.    Dr. ALLAN

## 2024-08-26 NOTE — TELEPHONE ENCOUNTER
----- Message from Jes Cooperry sent at 8/26/2024  2:10 PM CDT -----  .Type:  Patient Call Back    Who Called: KARLA  WITH [facility]       Does the patient know what this is regarding?: SHE CALLED TO SPEAK WITH THE OFFICE TO SEE HOW LONG PT HAS HAD THE CATHETER IN AND THE SIZE OF IT. PLEASE REACH OUT TO HER ON THIS MATTER TODAY PLEASE      Would the patient rather a call back YES     Best Call Back Number: [phone]     Additional Information: Thank You

## 2024-08-26 NOTE — TELEPHONE ENCOUNTER
----- Message from Leonor Tobin sent at 8/26/2024 11:49 AM CDT -----  Regarding: Nurse called states she wld like to speak with someone regarding the pt's catherter  Name of Who is Calling:Shawna (Nurse)        What is the request in detail:Nurse called states she wld like to speak with someone regarding the pt's catheter. Please advise         Can the clinic reply by [facility]:No        What Number to Call Back if not in [facility]: [phone]

## 2024-08-26 NOTE — TELEPHONE ENCOUNTER
Returned call to Kalina (). Wanted to confirm that pt was no longer under Dr. Mayo's care. Received v/o from PCP to have SN change suprapubic catheter but needed to know how long pt has had sptube and what size. Notified that pt has had sptube for all my 8 yrs in Urology and uses a 20 FR silicone meadows, 30 mL balloon. Verbalized understanding that pt will be seeking care of new Urologist.

## 2024-08-26 NOTE — TELEPHONE ENCOUNTER
----- Message from Carolina Prasad sent at 8/26/2024 12:26 PM CDT -----  Type: General Call Back     Name of Caller:Home Health  Symptoms:  Would the patient rather a call back or a response via [facility]? call  Best Call Back Number:[phone]  Additional Information: shawna states that the patient no longer has a current urologist and are looking for a new provider but until now they are requesting an order to be put in to change her catheter. Shawna states the insertion site is irritated and the patient does not know how long it has been changed. Please give her a call if further information is needed.

## 2024-08-26 NOTE — PROGRESS NOTES
Wound Care & Hyperbaric Medicine    Subjective:       Patient ID: Tasha Hawley is a 69 y.o. female.    Chief Complaint: Wound Care and Wound Check    Follow up related to left foot wound. Patient complaining of pain to bilateral feet and legs, states that she had chills over the weekend but no fever. No other symptoms noted. Patient requested coflex wrap, states that she previously had coflex with zinc with no problems despite listed allergies. Plan of care updated. Orders faxed to home health.        Review of Systems   All other systems reviewed and are negative.        Objective:     Vitals:    08/26/24 1322   BP: (!) 121/54   Pulse: 87   Temp: 97.8 °F (36.6 °C)         Physical Exam       Wound 05/29/24 1448 Non pressure chronic ulcer Left plantar Foot (Active)   05/29/24 1448   Present on Original Admission: N   Primary Wound Type: Non pressure   Side: Left   Orientation: plantar   Location: Foot   Wound Approximate Age at First Assessment (Weeks):    Wound Number:    Is this injury device related?:    Incision Type:    Closure Method:    Wound Description (Comments):    Type:    Additional Comments:    Ankle-Brachial Index:    Pulses:    Removal Indication and Assessment:    Wound Outcome:    Wound Image    08/26/24 1300   Dressing Appearance Moist drainage 08/26/24 1300   Drainage Amount Moderate 08/26/24 1300   Drainage Characteristics/Odor Serosanguineous 08/26/24 1300   Appearance Pink;Red;Not granulating;Moist;Fibrin 08/26/24 1300   Tissue loss description Full thickness 08/26/24 1300   Red (%), Wound Tissue Color 90 % 08/26/24 1300   Yellow (%), Wound Tissue Color 10 % 08/26/24 1300   Periwound Area Intact;Redness;Edematous;Moist 08/26/24 1300   Wound Edges Open 08/26/24 1300   Wound Length (cm) 1.5 cm 08/26/24 1300   Wound Width (cm) 1.8 cm 08/26/24 1300   Wound Depth (cm) 0.1 cm 08/26/24 1300   Wound Volume (cm^3) 0.27 cm^3 08/26/24 1300   Wound Surface Area (cm^2) 2.7 cm^2  08/26/24 1300   Care Cleansed with:;Soap and water;Antimicrobial agent 08/26/24 1300   Dressing Applied;Hydrofiber;Methylene blue/gentian violet;Absorptive Pad;Cast padding;Compression wrap 08/26/24 1300   Periwound Care Absorptive dressing applied 08/26/24 1300   Compression Two layer compression 08/26/24 1300   Off Loading Football dressing;Off loading shoe 08/26/24 1300   Dressing Change Due 08/29/24 08/26/24 1300            Wound 08/19/24 1421 Non pressure chronic ulcer Right plantar Foot (Active)   08/19/24 1421   Present on Original Admission:    Primary Wound Type: Non pressure   Side: Right   Orientation: plantar   Location: Foot   Wound Approximate Age at First Assessment (Weeks):    Wound Number:    Is this injury device related?:    Incision Type:    Closure Method:    Wound Description (Comments):    Type:    Additional Comments:    Ankle-Brachial Index:    Pulses:    Removal Indication and Assessment:    Wound Outcome:    Wound Image    08/26/24 1300   Dressing Appearance Clean;Dry;Intact 08/26/24 1300   Drainage Amount None 08/26/24 1300   Appearance Closed/resurfaced 08/26/24 1300   Tissue loss description Not applicable 08/26/24 1300   Periwound Area Intact;Dry;Redness;Edematous 08/26/24 1300   Wound Length (cm) 0 cm 08/26/24 1300   Wound Width (cm) 0 cm 08/26/24 1300   Wound Depth (cm) 0 cm 08/26/24 1300   Wound Volume (cm^3) 0 cm^3 08/26/24 1300   Wound Surface Area (cm^2) 0 cm^2 08/26/24 1300   Care Cleansed with:;Soap and water 08/26/24 1300   Dressing Applied;Cast padding;Absorptive Pad;Compression wrap 08/26/24 1300   Periwound Care Absorptive dressing applied 08/26/24 1300   Compression Two layer compression 08/26/24 1300   Off Loading Football dressing;Off loading shoe 08/26/24 1300   Dressing Change Due 08/29/24 08/26/24 1300         Assessment/Plan:         ICD-10-CM ICD-9-CM   1. Chronic ulcer of left foot with fat layer exposed  L97.522 707.15   2. Lymphedema of both lower extremities   I89.0 457.1   3. Bilateral pes planus  M21.41 734    M21.42        Erythema is dependent.   No signs of infection or necrosis.  Needs good compression and offload of charcot deformities.    Tissue pathology and/or culture taken:  [] Yes [x] No   Sharp debridement performed:   [] Yes [x] No   Labs ordered this visit:   [] Yes [x] No   Imaging ordered this visit:   [] Yes [x] No           Orders Placed This Encounter   Procedures    Change dressing     Left plantar foot   Cleanse wound with: Vashe   Lidocaine:prn   Silver nitrate:prn   Periwound care: gentian violet prn  Primary dressing: moist Hydrofera Classic   Secondary dressin ABD   Offloading: football dressing with 2 cast padding     Right plantar foot   Cleanse with: water and soap   Lidocaine:prn   Silver nitrate:prn   Periwound care:prn   Primary dressin ABD or 2 foams   Offloading: football dressing with 2 cast padding     Edema control: Coflex with zinc from toes to back of knee to bilateral legs  Frequency: twice weekly  Follow-up: 3 weeks Dr Wylie   [facility]: please provide skilled wound care for patient as above 2 x week when not seen in clinic, weekly when seen in clinic.   Other Orders: Patient to continue taking Levaquin        Follow up in about 3 weeks (around 2024) for Dr Wylie.            This includes face to face time and non-face to face time preparing to see the patient (eg, review of tests), obtaining and/or reviewing separately obtained history, documenting clinical information in the electronic or other health record, independently interpreting results and communicating results to the patient/family/caregiver, or care coordinator.

## 2024-08-26 NOTE — TELEPHONE ENCOUNTER
Pts [facility] nurse called to notify MD that pts legs were red and swollen and she has pain in her right calf. Patient is currently in clinic for visit and  was notified.

## 2024-08-26 NOTE — TELEPHONE ENCOUNTER
Informed Ms. Shawna per Dr. Hodges, the pt needs to see urology for this issue. She verbalized understanding.    Josh TREVIZO

## 2024-08-26 NOTE — TELEPHONE ENCOUNTER
Ms. Matos accepted the verbal order to change catheter. She also stated where its inserted, there is a little irritation there. She wants to know if you have any recommendations to help assist with this irritation?    Josh TREVIZO

## 2024-08-30 ENCOUNTER — TELEPHONE (OUTPATIENT)
Dept: INTERNAL MEDICINE | Facility: CLINIC | Age: 69
End: 2024-08-30
Payer: MEDICARE

## 2024-08-30 ENCOUNTER — HOSPITAL ENCOUNTER (EMERGENCY)
Facility: HOSPITAL | Age: 69
Discharge: HOME | End: 2024-08-31
Attending: EMERGENCY MEDICINE
Payer: MEDICARE

## 2024-08-30 VITALS
TEMPERATURE: 98 F | WEIGHT: 243 LBS | OXYGEN SATURATION: 93 % | BODY MASS INDEX: 41.48 KG/M2 | HEIGHT: 64 IN | DIASTOLIC BLOOD PRESSURE: 59 MMHG | SYSTOLIC BLOOD PRESSURE: 109 MMHG | HEART RATE: 76 BPM | RESPIRATION RATE: 19 BRPM

## 2024-08-30 DIAGNOSIS — R06.02 SHORTNESS OF BREATH: ICD-10-CM

## 2024-08-30 DIAGNOSIS — N39.45 CONTINUOUS LEAKAGE OF URINE: Primary | ICD-10-CM

## 2024-08-30 LAB
ALBUMIN SERPL BCP-MCNC: 3.1 G/DL (ref 3.5–5.2)
ALP SERPL-CCNC: 133 U/L (ref 55–135)
ALT SERPL W/O P-5'-P-CCNC: 6 U/L (ref 10–44)
ANION GAP SERPL CALC-SCNC: 10 MMOL/L (ref 8–16)
AST SERPL-CCNC: 13 U/L (ref 10–40)
BASOPHILS # BLD AUTO: 0.02 K/UL (ref 0–0.2)
BASOPHILS NFR BLD: 0.3 % (ref 0–1.9)
BILIRUB SERPL-MCNC: 0.3 MG/DL (ref 0.1–1)
BNP SERPL-MCNC: <10 PG/ML (ref 0–99)
BUN SERPL-MCNC: 10 MG/DL (ref 8–23)
CALCIUM SERPL-MCNC: 9 MG/DL (ref 8.7–10.5)
CHLORIDE SERPL-SCNC: 103 MMOL/L (ref 95–110)
CO2 SERPL-SCNC: 29 MMOL/L (ref 23–29)
CREAT SERPL-MCNC: 0.9 MG/DL (ref 0.5–1.4)
DIFFERENTIAL METHOD BLD: ABNORMAL
EOSINOPHIL # BLD AUTO: 0.5 K/UL (ref 0–0.5)
EOSINOPHIL NFR BLD: 7.6 % (ref 0–8)
ERYTHROCYTE [DISTWIDTH] IN BLOOD BY AUTOMATED COUNT: 14.9 % (ref 11.5–14.5)
EST. GFR  (NO RACE VARIABLE): >60 ML/MIN/1.73 M^2
GLUCOSE SERPL-MCNC: 112 MG/DL (ref 70–110)
HCT VFR BLD AUTO: 34.1 % (ref 37–48.5)
HGB BLD-MCNC: 10.4 G/DL (ref 12–16)
IMM GRANULOCYTES # BLD AUTO: 0.01 K/UL (ref 0–0.04)
IMM GRANULOCYTES NFR BLD AUTO: 0.1 % (ref 0–0.5)
LYMPHOCYTES # BLD AUTO: 1.4 K/UL (ref 1–4.8)
LYMPHOCYTES NFR BLD: 20.5 % (ref 18–48)
MCH RBC QN AUTO: 27.4 PG (ref 27–31)
MCHC RBC AUTO-ENTMCNC: 30.5 G/DL (ref 32–36)
MCV RBC AUTO: 90 FL (ref 82–98)
MONOCYTES # BLD AUTO: 0.7 K/UL (ref 0.3–1)
MONOCYTES NFR BLD: 10 % (ref 4–15)
NEUTROPHILS # BLD AUTO: 4.2 K/UL (ref 1.8–7.7)
NEUTROPHILS NFR BLD: 61.5 % (ref 38–73)
NRBC BLD-RTO: 0 /100 WBC
PLATELET # BLD AUTO: 217 K/UL (ref 150–450)
PMV BLD AUTO: 9.4 FL (ref 9.2–12.9)
POTASSIUM SERPL-SCNC: 4 MMOL/L (ref 3.5–5.1)
PROT SERPL-MCNC: 6.9 G/DL (ref 6–8.4)
RBC # BLD AUTO: 3.79 M/UL (ref 4–5.4)
SODIUM SERPL-SCNC: 142 MMOL/L (ref 136–145)
TROPONIN I SERPL DL<=0.01 NG/ML-MCNC: <0.006 NG/ML (ref 0–0.03)
WBC # BLD AUTO: 6.88 K/UL (ref 3.9–12.7)

## 2024-08-30 PROCEDURE — 93010 ELECTROCARDIOGRAM REPORT: CPT | Mod: ,,, | Performed by: INTERNAL MEDICINE

## 2024-08-30 PROCEDURE — 99285 EMERGENCY DEPT VISIT HI MDM: CPT | Mod: 25

## 2024-08-30 PROCEDURE — 93005 ELECTROCARDIOGRAM TRACING: CPT

## 2024-08-30 PROCEDURE — 83880 ASSAY OF NATRIURETIC PEPTIDE: CPT | Performed by: EMERGENCY MEDICINE

## 2024-08-30 PROCEDURE — 80053 COMPREHEN METABOLIC PANEL: CPT | Performed by: EMERGENCY MEDICINE

## 2024-08-30 PROCEDURE — 85025 COMPLETE CBC W/AUTO DIFF WBC: CPT | Performed by: EMERGENCY MEDICINE

## 2024-08-30 PROCEDURE — 84484 ASSAY OF TROPONIN QUANT: CPT | Performed by: EMERGENCY MEDICINE

## 2024-08-30 NOTE — TELEPHONE ENCOUNTER
Lm for  nurse to return a call.  Reached out to pt and obtained the 103/67- 80- 16-97.7   91%.    Reached out to   asking her to reach out to N to help this connection today.  Pt had wound care yesterday.  Suprapubic cath was changed yesterday.   Pt said she needs a Urologist.

## 2024-08-30 NOTE — TELEPHONE ENCOUNTER
----- Message from Argelia Triplett sent at 8/30/2024  8:57 AM CDT -----  Contact: [phone] with [facility]  Patient would like to get medical advice.  Symptoms (please be specific):   Shawna states the pt had a weight gain; last week 230 lbs and today 243 lbs. Shawna also states the pt is having SOB when ambulating.   How long have you had these symptoms: N/A  Would you like a call back, or a response through your [facility] portal?:   call back to Shawna    Pharmacy name and phone # (copy from chart):   N/A  Comments:

## 2024-08-30 NOTE — TELEPHONE ENCOUNTER
I called and spoke to Shawna with home healthcare, Her weight went up 13 pounds, with ambulation she gets SOB, She was not on oxygen at the time and her  O2 was 92 on room air. Her BP- 103/67 and heart rate 80. I called and spoke to the daughter who states her mom looks better when she is with her. The patient states Dr. Hodges told her she can take extra fluid pill whenever she is having fluid retention issues.This morning  BP, 137/75 HR 85 She has been short winded for a week, she has noticed the weight gain. She has a bottle of Potassium 1 tablet BID - Carroll's name is on it but I don't see it in her Med Profile, She has been taking for 3 days because she was having jerks in her legs and hands and face. Patient is eating high sodium items according to the daughter.  Pulse ox this morning 81 off her oxygen.  Regularly on 2 liters. I asked her to please put on her oxygen at the 2 liters and let me know what her pulse ox said. It registered at 96:. The issue remained of 13 pound weight gain. Patient self medicating with Potassium pills this week from an old prescription to treat self reported muscle jerking in hands, legs and face. I advised ER this morning to evaluate 13 pound weight gain, Potassium blood level draw and whatever else ER would have to do to get her safely reset and back home. Thank you Dr. Hodges Patient and Daughter both agreed she would go to [facility]. Patient did not confess to taking extra Lasix this week.

## 2024-08-31 NOTE — ED PROVIDER NOTES
Encounter Date: 8/30/2024       History     Chief Complaint   Patient presents with    multiple complaints     Reports I am in fluid overload, I am having issues with my oxygen level and I have a sore on my toe that I am in wound care for that is hurting     The patient is a 69-year-old female who came to the emergency department because her wound care nurse came yesterday and told her to come to the emergency department today because her pulse ox was low.  She denies any new complaints at this time.  She denies any shortness of breath or chest pain.  She is concerned because she feels that she has gained weight recently, she has chronic lymphedema to her bilateral lower extremities.      Review of patient's allergies indicates:   Allergen Reactions    (d)-limonene flavor      Other reaction(s): difficult intubation  Other reaction(s): Difficulty breathing    Benadryl [diphenhydramine hcl] Shortness Of Breath    Fentanyl Itching, Nausea And Vomiting and Swelling             Imitrex [sumatriptan succinate] Shortness Of Breath    Oxycodone-acetaminophen Shortness Of Breath    Sulfamethoxazole-trimethoprim Anaphylaxis    Topiramate Shortness Of Breath    Vancomycin Anaphylaxis     Rash    Butorphanol tartrate     Darvocet a500 [propoxyphene n-acetaminophen]      Other reaction(s): Difficulty breathing    Evening primrose (oenothera biennis)     Latex     Pregabalin Other (See Comments)     tremors    Sumatriptan     White petrolatum-zinc     Zinc acetate     Zinc oxide-white petrolatum      Other reaction(s): Difficulty breathing    Latex, natural rubber Itching and Rash    Phenytoin Rash and Other (See Comments)     Trouble breathing     Past Medical History:   Diagnosis Date    Anxiety     Arthritis     Bilateral lower extremity edema     severe chronic    Carotid artery occlusion     Cataract     CHF (congestive heart failure)     Coronary artery disease     subtotalled LAD with collateral    Depression     Fever  blister     Hard of hearing     Hypokalemia 01/09/2023    Hyponatremia 02/04/2022    Hypothyroid     Iron deficiency anemia     Lumbar radiculopathy     with chronic pain    Ocular migraine     Other emphysema 05/22/2023    Renal disorder     Sleep apnea     cpap     Past Surgical History:   Procedure Laterality Date    ADENOIDECTOMY      BACK SURGERY      x 12    CARDIAC CATHETERIZATION  2016    subtotalled LAD with right to left collaterals    CATARACT EXTRACTION W/  INTRAOCULAR LENS IMPLANT Left     Dr Coleman     CYSTOSCOPIC LITHOLAPAXY N/A 6/27/2019    Procedure: CYSTOLITHOLAPAXY;  Surgeon: Shireen Mayo MD;  Location: [facility] OR 2ND FLR;  Service: Urology;  Laterality: N/A;    CYSTOSCOPIC LITHOLAPAXY N/A 9/3/2019    Procedure: CYSTOLITHOLAPAXY;  Surgeon: Shireen Mayo MD;  Location: [facility] OR 2ND FLR;  Service: Urology;  Laterality: N/A;    CYSTOSCOPY N/A 7/13/2021    Procedure: CYSTOSCOPY;  Surgeon: Shireen Mayo MD;  Location: [facility] OR 1ST FLR;  Service: Urology;  Laterality: N/A;    CYSTOSCOPY  11/16/2021    Procedure: CYSTOSCOPY;  Surgeon: Shireen Mayo MD;  [facility];  Service: Urology;;    CYSTOSCOPY  7/19/2022    Procedure: CYSTOSCOPY;  Surgeon: Shireen Mayo MD;  [facility];  Service: Urology;;    CYSTOSCOPY WITH INJECTION OF PERIURETHRAL BULKING AGENT  7/19/2022    Procedure: CYSTOSCOPY, WITH PERIURETHRAL BULKING AGENT INJECTION-MACROPLASTIQUE;  Surgeon: Shireen Mayo MD;  Location: [facility] OR 1ST FLR;  Service: Urology;;    CYSTOSCOPY WITH INJECTION OF PERIURETHRAL BULKING AGENT N/A 3/28/2023    Procedure: CYSTOSCOPY, WITH PERIURETHRAL BULKING AGENT INJECTION;  Surgeon: Shireen Mayo MD;  [facility];  Service: Urology;  Laterality: N/A;  [place]    CYSTOSCOPY WITH INJECTION OF PERIURETHRAL BULKING AGENT N/A 11/14/2023    Procedure: CYSTOSCOPY, WITH PERIURETHRAL BULKING AGENT INJECTION;  Surgeon: Shireen Mayo MD;  Location: [facility]  OR 1ST FLR;  Service: Urology;  Laterality: N/A;    CYSTOSCOPY,WITH BOTULINUM TOXIN INJECTION N/A 12/13/2022    Procedure: CYSTOSCOPY,WITH BOTULINUM TOXIN INJECTION;  Surgeon: Shireen Mayo MD;  Location: [facility] OR 1ST FLR;  Service: Urology;  Laterality: N/A;  300 U    CYSTOSCOPY,WITH BOTULINUM TOXIN INJECTION N/A 3/28/2023    Procedure: CYSTOSCOPY,WITH BOTULINUM TOXIN INJECTION;  Surgeon: Shireen Mayo MD;  Location: [facility] OR 1ST FLR;  Service: Urology;  Laterality: N/A;  45 MIN.    300 UNITS    ESOPHAGOGASTRODUODENOSCOPY N/A 5/23/2018    Procedure: ESOPHAGOGASTRODUODENOSCOPY (EGD);  Surgeon: Prince Max MD;  Location: [facility] (4TH FLR);  Service: Endoscopy;  Laterality: N/A;  r/s 'd per Dr. Max due to family emergency- ER    ESOPHAGOGASTRODUODENOSCOPY N/A 6/23/2023    Procedure: EGD (ESOPHAGOGASTRODUODENOSCOPY);  Surgeon: Juaquin Barry MD;  Location: [facility] (2ND FLR);  Service: Endoscopy;  Laterality: N/A;  Refferal: PRINCE MAX  Order  tele enc 5/18/23  dx: history of a Nissen fundoplication  Additional Scheduling Information:  On home oxygen 2nd floor for airway protection also with a pain pump elevated BMI close to 40   Prep Gastroparesis   ins    ESOPHAGOGASTRODUODENOSCOPY N/A 8/12/2024    Procedure: EGD (ESOPHAGOGASTRODUODENOSCOPY);  Surgeon: Cat Cortés MD;  Location: [facility] ENDO (2ND FLR);  Service: Endoscopy;  Laterality: N/A;  referral dr max / prep inst mailed / gastropareisis/  8/5-called pt for pre call-unable to [facility]-portal message sent-tb-[facility]-intrathecal pain pump  8/7 precall complete -ml    HYSTERECTOMY  1975    endometriosis    INJECTION OF BOTULINUM TOXIN TYPE A  7/13/2021    Procedure: INJECTION, BOTULINUM TOXIN, 200units;  Surgeon: Shireen Mayo MD;  Location: [facility] OR 1ST FLR;  Service: Urology;;    INJECTION OF BOTULINUM TOXIN TYPE A  11/16/2021    Procedure: INJECTION, BOTULINUM TOXIN, 200units;  Surgeon: Shireen Mayo MD;   [facility];  Service: Urology;;    INJECTION OF BOTULINUM TOXIN TYPE A  7/19/2022    Procedure: INJECTION, BOTULINUM TOXIN, 300 units ;  Surgeon: Shireen Mayo MD;  [facility];  Service: Urology;;    INJECTION OF BOTULINUM TOXIN TYPE A N/A 11/14/2023    Procedure: INJECTION, BOTULINUM TOXIN, TYPE A;  Surgeon: Shireen Mayo MD;  Location: [facility] OR [address] [place] [address];  Service: Urology;  Laterality: N/A;    INSERTION, SUPRAPUBIC CATHETER N/A 12/13/2022    Procedure: INSERTION, SUPRAPUBIC CATHETER;  Surgeon: Shireen Mayo MD;  [facility];  Service: Urology;  Laterality: N/A;  exchange    INSERTION, SUPRAPUBIC CATHETER N/A 11/14/2023    Procedure: INSERTION, SUPRAPUBIC CATHETER;  Surgeon: Shireen Mayo MD;  Location: [facility] OR [address] [place] [address];  Service: Urology;  Laterality: N/A;  EXCHANGE of s/p tube    pain pump placement      SQ Dilaudid Pump managed by Dr. Hillman, Pain Management    REMOVAL OF BONE SPUR OF FOOT Bilateral 9/16/2022    Procedure: EXCISION ARTHRITIC BONE, BILATERAL FOOT;  Surgeon: Adam Mcguire [facility];  Location: [facility];  Service: Podiatry;  Laterality: Bilateral;    REPLACEMENT OF BACLOFEN PUMP N/A 8/2/2023    Procedure: REPLACEMENT, BACLOFEN PUMP;  Surgeon: Smitha Condon MD;  Location: [facility] OR [address] [place] [address];  Service: Neurosurgery;  Laterality: N/A;  Anes: Gen  Blood: Type & Screen  Pos: Left Lat  Intrathecal Pump  Bed: Reg Reversed  Rad: C-arm  Pump: 40 cc, [employer]tronics    REPLACEMENT OF CATHETER N/A 10/31/2019    Procedure: REPLACEMENT, CATHETER-SUPRAPUBIC;  Surgeon: Shireen Mayo MD;  Location: [facility] OR [address] [place] [address];  Service: Urology;  Laterality: N/A;    SPINAL CORD STIMULATOR REMOVAL      before Larissa    SPINE SURGERY  5-13-13    CERVICAL FUSION    TONSILLECTOMY       Family History   Problem Relation Name Age of Onset    Cancer Mother  55        breast    Cancer Father          esophagus,had laryngectomy    Esophageal cancer Father      Parkinsonism  "Maternal Grandmother      Tremor Maternal Grandmother      No Known Problems Brother      No Known Problems Brother      Heart disease Maternal Uncle klarissa     Colon cancer Maternal Uncle klarissa         Less than 60    No Known Problems Sister      No Known Problems Maternal Aunt      Cirrhosis Paternal Aunt          ETOH    Liver disease Paternal Aunt          ETOH    Liver disease Paternal Uncle          ETOH    Cirrhosis Paternal Uncle          ETOH    No Known Problems Maternal Grandfather      No Known Problems Paternal Grandmother      No Known Problems Paternal Grandfather      Melanoma Neg Hx      Amblyopia Neg Hx      Blindness Neg Hx      Cataracts Neg Hx      Diabetes Neg Hx      Glaucoma Neg Hx      Hypertension Neg Hx      Macular degeneration Neg Hx      Retinal detachment Neg Hx      Strabismus Neg Hx      Stroke Neg Hx      Thyroid disease Neg Hx      Celiac disease Neg Hx      Colon polyps Neg Hx      Cystic fibrosis Neg Hx      Crohn's disease Neg Hx      Inflammatory bowel disease Neg Hx      Liver cancer Neg Hx      Rectal cancer Neg Hx      Stomach cancer Neg Hx      Ulcerative colitis Neg Hx      Lymphoma Neg Hx       Social History     Tobacco Use    Smoking status: Never    Smokeless tobacco: Never   Substance Use Topics    Alcohol use: Not Currently    Drug use: Yes     Types: Marijuana     Comment: "medical marijuana gummies"     Review of Systems   All other systems reviewed and are negative.      Physical Exam     Initial Vitals [08/30/24 2030]   BP Pulse Resp Temp SpO2   (!) 104/56 82 20 97.7 °F (36.5 °C) 96 %      MAP       --         Physical Exam    Nursing note and vitals reviewed.  Constitutional: She appears well-developed and well-nourished.   HENT:   Head: Normocephalic and atraumatic.   Mouth/Throat: Oropharynx is clear and moist.   Eyes: Conjunctivae and EOM are normal. Pupils are equal, round, and reactive to light.   Neck: Neck supple.   Normal range of " motion.  Cardiovascular:  Normal rate, regular rhythm, normal heart sounds and intact distal pulses.     Exam reveals no gallop and no friction rub.       No murmur heard.  Pulmonary/Chest: Breath sounds normal.   Abdominal: Abdomen is soft. Bowel sounds are normal. She exhibits no distension. There is no abdominal tenderness.   Musculoskeletal:         General: Edema (4+ nonpitting edema to bilateral lower extremities) present. No tenderness. Normal range of motion.      Cervical back: Normal range of motion and neck supple.     Lymphadenopathy:     She has no cervical adenopathy.   Neurological: She is alert and oriented to person, place, and time. She has normal strength and normal reflexes.   Skin: Skin is warm and dry.   Healing wounds to her bilateral plantar feet.  The feet were wrapped with wound care dressings that were taken down in the emergency department to evaluate her wounds.  There is no drainage or erythema.   Psychiatric: She has a normal mood and affect. Her behavior is normal. Judgment and thought content normal.         ED Course   Procedures  Labs Reviewed   CBC W/ AUTO DIFFERENTIAL - Abnormal       Result Value    WBC 6.88      RBC 3.79 (*)     Hemoglobin 10.4 (*)     Hematocrit 34.1 (*)     MCV 90      MCH 27.4      MCHC 30.5 (*)     RDW 14.9 (*)     Platelets 217      MPV 9.4      Immature Granulocytes 0.1      Gran # (ANC) 4.2      Immature Grans (Abs) 0.01      Lymph # 1.4      Mono # 0.7      Eos # 0.5      Baso # 0.02      nRBC 0      Gran % 61.5      Lymph % 20.5      Mono % 10.0      Eosinophil % 7.6      Basophil % 0.3      Differential Method Automated     COMPREHENSIVE METABOLIC PANEL - Abnormal    Sodium 142      Potassium 4.0      Chloride 103      CO2 29      Glucose 112 (*)     BUN 10      Creatinine 0.9      Calcium 9.0      Total Protein 6.9      Albumin 3.1 (*)     Total Bilirubin 0.3      Alkaline Phosphatase 133      AST 13      ALT 6 (*)     eGFR >60      Anion Gap 10      TROPONIN I    Troponin I <0.006     B-TYPE NATRIURETIC PEPTIDE    BNP <10       EKG Readings: (Independently Interpreted)   Initial: 2107. Rhythm: Normal Sinus Rhythm. Heart Rate: 75. Ectopy: No Ectopy. Conduction: Normal. ST Segments: Normal ST Segments. T Waves: Normal. Clinical Impression: Normal Sinus Rhythm       Imaging Results              X-Ray Chest AP Portable (Final result)  Result time 08/30/24 22:43:18      Final result by Josh Hernadez MD (08/30/24 22:43:18)                   Impression:      Hypoventilatory changes with increased densities in the left lung base.  Correlate for left lung base atelectasis versus residual infiltrate.  Not significantly changed since 07/18/2024 allowing for degree of inspiratory phase.      Electronically signed by: Josh Hernadez  Date:    08/30/2024  Time:    22:43               Narrative:    EXAMINATION:  XR CHEST AP PORTABLE    CLINICAL HISTORY:  CHF;    TECHNIQUE:  Single frontal view of the chest was performed.    COMPARISON:  07/18/2024    FINDINGS:  Heart mediastinal contours appear stable.  Hypoventilatory changes with prominence of the interstitium again noted left greater than right.  Air for structure behind the heart on the right suggests hiatal hernia.  Dental implants and spinal cord stimulator noted.                                       Medications - No data to display  Medical Decision Making  Differential Diagnosis includes, but is not limited to:  PE, MI/ACS, pneumothorax, pericardial effusion/tamonade, pneumonia, lung abscess, pericarditis/myocarditis, pleural effusion, lung mass, CHF exacerbation, asthma exacerbation, COPD exacerbation, aspirated/ingested foreign body, airway obstruction, CO poisoning, anemia, metabolic derangement, allergy/atopy, influenza, viral URI, viral syndrome.     MDM:  The patient is a 69-year-old female who came in with worsening lymphedema to her bilateral lower extremities and she was told her pulse ox was low.   The patient is on home O2.  Her blood pressure is normal to low tonight.  Her labs are within normal limits and the patient is asymptomatic.  She has no wounds on her body or any other evidence of infection. She will be discharged at this time.  She is requesting a referral to urology clinic.  She was recently seeing Dr. Mayo but was dismissed from her clinic. She needs a new Urologist.    Amount and/or Complexity of Data Reviewed  Labs: ordered. Decision-making details documented in ED Course.  Radiology: ordered. Decision-making details documented in ED Course.  ECG/medicine tests: ordered and independent interpretation performed. Decision-making details documented in ED Course.               ED Course as of 08/31/24 0013   Fri Aug 30, 2024   2321 Comprehensive metabolic panel(!) [ST]   2321 Brain natriuretic peptide [ST]   2321 CBC auto differential(!) [ST]   2321 Troponin I [ST]   2324 X-Ray Chest AP Portable  Impression:     Hypoventilatory changes with increased densities in the left lung base.  Correlate for left lung base atelectasis versus residual infiltrate.  Not significantly changed since 07/18/2024 allowing for degree of inspiratory phase.      [ST]   2324 CBC auto differential(!) [ST]   2324 Brain natriuretic peptide [ST]   2325 Troponin I [ST]      ED Course User Index  [ST] Tasha Anthony MD                           Clinical Impression:  Final diagnoses:  [R06.02] Shortness of breath  [N39.45] Continuous leakage of urine (Primary)          ED Disposition Condition    Discharge Stable          ED Prescriptions    None       Follow-up Information       Follow up With Specialties Details Why Contact Info    Mesfin Hodges II, MD Internal Medicine   [address] [place] [id number]  [phone]               Tasha Anthony MD  08/31/24 0013

## 2024-08-31 NOTE — ED NOTES
Wound care nurse came out yesterday to dress wounds. The nurse checked her vitals signs, and called this morning to refer them to the ER jillian to her low vital signs.    Pt also states that she feels like she is filled with fluid and she feels short of breath

## 2024-09-03 ENCOUNTER — TELEPHONE (OUTPATIENT)
Dept: UROLOGY | Facility: CLINIC | Age: 69
End: 2024-09-03
Payer: MEDICARE

## 2024-09-03 NOTE — TELEPHONE ENCOUNTER
----- Message from Nicolette Farrell sent at 9/3/2024  1:03 PM CDT -----  Needs advice from nurse:      Who Called:pt  Regarding:needs to know if Dr Armstrong does Botox injection, if so, needs to become established/pt has catheter  Would the patient rather a call back or [facility]IA [facility]?  Best Call Back number:[phone]  Additional Info:

## 2024-09-03 NOTE — TELEPHONE ENCOUNTER
Spoke to patient and she is wanting to switch urologists, she states she has an SP tube but it gets changed through home health, she is also interested in botox. Notified her of 's next appt and appt date given

## 2024-09-04 LAB
OHS QRS DURATION: 100 MS
OHS QTC CALCULATION: 451 MS

## 2024-09-06 NOTE — PT/OT/SLP EVAL
Occupational Therapy   Evaluation    Name: Tasha Hawley  MRN: [id number]  Admitting Diagnosis: UTI (urinary tract infection)  Recent Surgery: * No surgery found *      Recommendations:     Discharge Recommendations:  (TBD pending progress with therapies)  Discharge Equipment Recommendations:  none  Barriers to discharge:   (Pt requires increased assistance)    Assessment:     Tasha Hawley is a 67 y.o. female with a medical diagnosis of UTI (urinary tract infection).  She presents with low BP in supine reading 88/43 HR 44; EOB/OOB activity deferred at this time 2/2 earlier concerns for orthostatic hypotension while seated EOB with PT. Performance deficits affecting function: weakness, impaired endurance, impaired sensation, impaired self care skills, impaired functional mobility, impaired balance, gait instability, decreased upper extremity function, decreased lower extremity function, pain, impaired skin, impaired cardiopulmonary response to activity, decreased ROM.      Rehab Prognosis: Good; patient would benefit from acute skilled OT services to address these deficits and reach maximum level of function.       Plan:     Patient to be seen 3 x/week to address the above listed problems via self-care/home management, therapeutic activities, therapeutic exercises  Plan of Care Expires: 02/09/23  Plan of Care Reviewed with: patient, spouse    Subjective     Chief Complaint: Pt reports pain with slight movement BLE  Patient/Family Comments/goals: To return to PLOF    Occupational Profile:  Living Environment: Pt lives with spouse in [facility], ramp access, tub.sh with TTB  Previous level of function: Mod I to PRN assistance for ADLs and functional mobility; spouse reports that he assists pt to bathe from tub bottom despite having TTB as pt states she prefers seated baths.   Roles and Routines: Caretaker to self and home. Cooks, cleans, but does not drive at this time Pt completes grocery shopping accompanied by  spouse with use of motorized cart or using cart to assist with walking. Pt states that she enjoys fishing.   Equipment Used at Home: bedside commode, bath bench, walker, rolling, rollator, wheelchair  Assistance upon Discharge: Family    Pain/Comfort:  Pain Rating 1:  (did not c/o pain)  Location - Side 1: Bilateral  Location - Orientation 1: generalized  Location 1: leg (with attempts to perform ankle pumps)  Pain Rating Post-Intervention 1:  (did not rate)    Patients cultural, spiritual, Hinduism conflicts given the current situation:      Objective:     Communicated with: nssamir prior to session.  Patient found HOB elevated with peripheral IV (suprapubic catheter) upon OT entry to room.    General Precautions: Standard, fall, hearing impaired  Orthopedic Precautions: N/A  Braces: N/A  Respiratory Status: Room air    Occupational Performance:      Cognitive/Visual Perceptual:  Cognitive/Psychosocial Skills:     -       Oriented to: Person, Place, Time and Situation   -       Follows Commands/attention:Follows single step commands  -       Communication: clear/fluent  -       Memory: No Deficits noted  -       Safety awareness/insight to disability: intact   -       Mood/Affect/Coping skills/emotional control: Appropriate to situation but lethargic in session, able to maintain alert state while participating in therapy but often resting with eyes closed. Easily wakened to voice    Physical Exam:  Postural examination/scapula alignment:    -       Rounded shoulders  Skin integrity: Pt reports edema BLE  Motor Planning:    -       WFL  Dominant hand:    -       R handed  Upper Extremity Range of Motion: BUE WFL  except B shoulder flexion limited ~0-100*   Upper Extremity Strength:  BUE grossly 3 to 3+/5   Strength:  B hands 3+/5  Fine Motor Coordination:    -       Intact  Gross motor coordination:   WFL     AMPAC 6 Click ADL:  AMPAC Total Score: 15    Treatment & Education:  Pt educated on role of OT and POC.    Pt performing skills as listed above.      Patient left HOB elevated with all lines intact, call button in reach, bed alarm on, nsg notified, and spouse present    GOALS:   Multidisciplinary Problems       Occupational Therapy Goals          Problem: Occupational Therapy    Goal Priority Disciplines Outcome Interventions   Occupational Therapy Goal     OT, PT/OT Ongoing, Progressing    Description: Goals to be met by: 02/09/2023      Patient will increase functional independence with ADLs by performing:    UE Dressing with Supervision.  LE Dressing with Minimal Assistance.  Grooming while standing with Stand-by Assistance.  Toileting from bedside commode or toilet with Contact Guard Assistance for hygiene and clothing management.   Toilet transfer to bedside commode or toilet with Contact Guard Assistance.  Increased functional strength to WFL for self care.  Upper extremity exercise program x10 reps per handout, with independence.                          History:     Past Medical History:   Diagnosis Date    Anticoagulant long-term use     Anxiety     Arthritis     Bilateral lower extremity edema     severe chronic    Carotid artery occlusion     Cataract     CHF (congestive heart failure)     Coronary artery disease     subtotalled LAD with collateral    Depression     Fever blister     Hard of hearing     Hypothyroid     Iron deficiency anemia     Lumbar radiculopathy     with chronic pain    Ocular migraine     Renal disorder     Sleep apnea     cpap         Past Surgical History:   Procedure Laterality Date    ADENOIDECTOMY      BACK SURGERY      x 12    CARDIAC CATHETERIZATION  2016    subtotalled LAD with right to left collaterals    CATARACT EXTRACTION W/  INTRAOCULAR LENS IMPLANT Left     Dr Coleman     CYSTOSCOPIC LITHOLAPAXY N/A 6/27/2019    Procedure: CYSTOLITHOLAPAXY;  Surgeon: Shireen Mayo MD;  Location: [facility] OR [address] [place] [address];  Service: Urology;  Laterality: N/A;    CYSTOSCOPIC LITHOLAPAXY N/A  9/3/2019    Procedure: CYSTOLITHOLAPAXY;  Surgeon: Shireen Mayo MD;  Location: [facility] OR 2ND FLR;  Service: Urology;  Laterality: N/A;    CYSTOSCOPY N/A 7/13/2021    Procedure: CYSTOSCOPY;  Surgeon: Shireen Mayo MD;  Location: [facility] OR 1ST FLR;  Service: Urology;  Laterality: N/A;    CYSTOSCOPY  11/16/2021    Procedure: CYSTOSCOPY;  Surgeon: Shireen Mayo MD;  Location: [facility] OR 1ST FLR;  Service: Urology;;    CYSTOSCOPY  7/19/2022    Procedure: CYSTOSCOPY;  Surgeon: Shireen Mayo MD;  Location: [facility] OR 1ST FLR;  Service: Urology;;    CYSTOSCOPY WITH INJECTION OF PERIURETHRAL BULKING AGENT  7/19/2022    Procedure: CYSTOSCOPY, WITH PERIURETHRAL BULKING AGENT INJECTION-MACROPLASTIQUE;  Surgeon: Shireen Mayo MD;  Location: [facility] OR 1ST FLR;  Service: Urology;;    CYSTOSCOPY,WITH BOTULINUM TOXIN INJECTION N/A 12/13/2022    Procedure: CYSTOSCOPY,WITH BOTULINUM TOXIN INJECTION;  Surgeon: Shireen Mayo MD;  Location: [facility] OR 1ST FLR;  Service: Urology;  Laterality: N/A;  300 U    ESOPHAGOGASTRODUODENOSCOPY N/A 5/23/2018    Procedure: ESOPHAGOGASTRODUODENOSCOPY (EGD);  Surgeon: Prince Vance MD;  Location: [facility] (4TH FLR);  Service: Endoscopy;  Laterality: N/A;  r/s 'd per Dr. Vance due to family emergency- ER    HYSTERECTOMY  1975    endometriosis    INJECTION OF BOTULINUM TOXIN TYPE A  7/13/2021    Procedure: INJECTION, BOTULINUM TOXIN, 200units;  Surgeon: Shireen Mayo MD;  Location: [facility] OR 1ST FLR;  Service: Urology;;    INJECTION OF BOTULINUM TOXIN TYPE A  11/16/2021    Procedure: INJECTION, BOTULINUM TOXIN, 200units;  Surgeon: Shireen Mayo MD;  Location: [facility] OR 1ST FLR;  Service: Urology;;    INJECTION OF BOTULINUM TOXIN TYPE A  7/19/2022    Procedure: INJECTION, BOTULINUM TOXIN, 300 units ;  Surgeon: Shireen Mayo MD;  Location: [facility] OR 1ST FLR;  Service: Urology;;    INSERTION, SUPRAPUBIC CATHETER N/A 12/13/2022    Procedure: INSERTION, SUPRAPUBIC CATHETER;  Surgeon:  96 Shireen Mayo MD;  Location: [facility] OR [address] [place] [address];  Service: Urology;  Laterality: N/A;  exchange    pain pump placement      SQ Dilaudid Pump managed by Dr. Hillman, Pain Management    REMOVAL OF BONE SPUR OF FOOT Bilateral 9/16/2022    Procedure: EXCISION ARTHRITIC BONE, BILATERAL FOOT;  Surgeon: Adam Mcguire DPM;  Location: [facility];  Service: Podiatry;  Laterality: Bilateral;    REPLACEMENT OF CATHETER N/A 10/31/2019    Procedure: REPLACEMENT, CATHETER-SUPRAPUBIC;  Surgeon: Shireen Mayo MD;  Location: [facility] OR [address] [place] [address];  Service: Urology;  Laterality: N/A;    SPINAL CORD STIMULATOR REMOVAL      before Larissa    SPINE SURGERY  5-13-13    CERVICAL FUSION    TONSILLECTOMY         Time Tracking:     OT Date of Treatment: 01/09/23  OT Start Time: 1520  OT Stop Time: 1537  OT Total Time (min): 17 min    Billable Minutes:Evaluation 17    1/9/2023

## 2024-09-10 ENCOUNTER — LAB VISIT (OUTPATIENT)
Dept: LAB | Facility: HOSPITAL | Age: 69
End: 2024-09-10
Attending: INTERNAL MEDICINE
Payer: MEDICARE

## 2024-09-10 ENCOUNTER — TELEPHONE (OUTPATIENT)
Dept: INTERNAL MEDICINE | Facility: CLINIC | Age: 69
End: 2024-09-10

## 2024-09-10 ENCOUNTER — TELEPHONE (OUTPATIENT)
Dept: INTERNAL MEDICINE | Facility: CLINIC | Age: 69
End: 2024-09-10
Payer: MEDICARE

## 2024-09-10 DIAGNOSIS — N39.0 RECURRENT UTI (URINARY TRACT INFECTION): ICD-10-CM

## 2024-09-10 DIAGNOSIS — N39.0 RECURRENT UTI (URINARY TRACT INFECTION): Primary | ICD-10-CM

## 2024-09-10 PROCEDURE — 87186 SC STD MICRODIL/AGAR DIL: CPT | Mod: 59 | Performed by: INTERNAL MEDICINE

## 2024-09-10 PROCEDURE — 87086 URINE CULTURE/COLONY COUNT: CPT | Performed by: INTERNAL MEDICINE

## 2024-09-10 PROCEDURE — 87184 SC STD DISK METHOD PER PLATE: CPT | Performed by: INTERNAL MEDICINE

## 2024-09-10 PROCEDURE — 87088 URINE BACTERIA CULTURE: CPT | Performed by: INTERNAL MEDICINE

## 2024-09-10 PROCEDURE — 81001 URINALYSIS AUTO W/SCOPE: CPT | Performed by: INTERNAL MEDICINE

## 2024-09-10 NOTE — PROGRESS NOTES
[facility]    Brief Operative Note    Pre-operative diagnosis: Primary osteoarthritis of left hip [M16.12]  Post-operative diagnosis Same as pre-operative diagnosis    Procedure: DIRECT ANTERIOR LEFT TOTAL HIP ARTHROPLASTY, Left - Hip    Surgeon: Surgeons and Role:     * Gerber Carpenter MD - Primary  Anesthesia: Spinal   Estimated Blood Loss: 500 ml    Drains: None  Specimens:   ID Type Source Tests Collected by Time Destination   A :  Bone Fragments Hip, Left OR DOCUMENTATION ONLY Gerber Carpentre MD 9/10/2024  4:00 PM      Findings:   See op report .  Complications: None.  Implants:   Implant Name Type Inv. Item Serial No.  Lot No. LRB No. Used Action   BONE CEMENT SIMPLEX W/TOBRAMYCIN 6197-9-001 - [id number] Cement, Bone BONE CEMENT SIMPLEX W/TOBRAMYCIN 6197-9-001  [employer] [id number] Left 1 Implanted   BONE CEMENT SIMPLEX W/TOBRAMYCIN 6197-9-001 - [id number] Cement, Bone BONE CEMENT SIMPLEX W/TOBRAMYCIN 6197-9-001  [employer] [id number] Left 1 Implanted   IMP LINER HOWM ACETABULUM 36MM 50-52MM 621-00-36E - [id number] Total Joint Component/Insert IMP LINER HOWM ACETABULUM 36MM 50-52MM 621-00-36E  [employer] [id number] Left 1 Implanted   TRIDENT II TRITANIUM CLUSTERHOLE 52E - [id number] Total Joint Component/Insert TRIDENT II TRITANIUM CLUSTERHOLE 52E  [employer] [id number] Left 1 Implanted   IMP SCR STRK LOW PROFILE HEX 6.5X25MM 6134-2730 - [id number] Metallic Hardware/[place] IMP SCR STRK LOW PROFILE HEX 6.5X25MM 1797-0206  [employer] [id number] Left 1 Implanted   6.5MM LOW PROFILE HEX SCR 20MM - [id number] Metallic Hardware/[place] 6.5MM LOW PROFILE HEX SCR 20MM  [employer] [id number] Left 1 Implanted   GRAFT BONE PLUG 9-12MM 6215-5-001 - [id number] Graft GRAFT BONE PLUG 9-12MM 6215-5-001  [employer] [id number] Left 1 Implanted   IMP SPACER OSTEONICS DISTAL CEMENT 08MM 7678-3357 - [id number] Metallic Hardware/[place] IMP SPACER  Pt noted to have nitrite+ urine per urology team. IV abx admin per orders. Pt discharged home with  and will be calling clinic to reschedule tomorrow.    OSTEONICS DISTAL CEMENT 08MM 1702-4456  [employer] [id number] Left 1 Implanted   IMP STEM FEMORAL STRK ACCOLADE-C ZION 127DEG #3 6057-0335D - [id number] Total Joint Component/Insert IMP STEM FEMORAL STRK ACCOLADE-C ZION 127DEG #3 6057-0335D  [employer] [id number] Left 1 Implanted   IMP HEAD FEMORAL STRK BIOLOX DELTA CERAMIC V40 36MM - [id number] Total Joint Component/Insert IMP HEAD FEMORAL STRK BIOLOX DELTA CERAMIC V40 36MM  [facility] [id number] Left 1 Implanted

## 2024-09-10 NOTE — TELEPHONE ENCOUNTER
Shawna, RN home health nurse reported she changed suprapubic cath 8/29.  Today the site is red, irritated, urine is dark and cloudy.   Pt also fell today bumped from the her head. The skin on the head is intact.   She feel from a standing position - raised area is not but pt is aaox3- EMS is en-route to place pt back in the recliner and evaluate.    Nurse will need an updated order to change the suprapubic catheter in 1 month.      EMS arrived while on the phone w/ HH nurse.    - HH nurse also confirmed pt does have all of her medication and has family who can take her to a local fire station if she doesn't go to the ER.

## 2024-09-10 NOTE — TELEPHONE ENCOUNTER
----- Message from Cuca Pascual MA sent at 9/10/2024 12:07 PM CDT -----  Contact: [phone]  Shawna ([facility]) called                  Ms Matos called to let provider now that patient fell and hit her butt, back of head and right shoulder. Pt has a super pubic catheter and insertion site is red, irritated, has odor, and urine has a dark cloudy odor. Also would like to get an order for  Urine sample.

## 2024-09-11 LAB
BACTERIA #/AREA URNS AUTO: ABNORMAL /HPF
BILIRUB UR QL STRIP: NEGATIVE
CLARITY UR REFRACT.AUTO: ABNORMAL
COLOR UR AUTO: YELLOW
GLUCOSE UR QL STRIP: NEGATIVE
HGB UR QL STRIP: ABNORMAL
HYALINE CASTS UR QL AUTO: 20 /LPF
KETONES UR QL STRIP: NEGATIVE
LEUKOCYTE ESTERASE UR QL STRIP: ABNORMAL
MICROSCOPIC COMMENT: ABNORMAL
NITRITE UR QL STRIP: NEGATIVE
PH UR STRIP: 6 [PH] (ref 5–8)
PROT UR QL STRIP: ABNORMAL
RBC #/AREA URNS AUTO: 55 /HPF (ref 0–4)
SP GR UR STRIP: 1.01 (ref 1–1.03)
SQUAMOUS #/AREA URNS AUTO: 0 /HPF
URN SPEC COLLECT METH UR: ABNORMAL
WBC #/AREA URNS AUTO: >100 /HPF (ref 0–5)
WBC CLUMPS UR QL AUTO: ABNORMAL

## 2024-09-12 ENCOUNTER — OUTPATIENT CASE MANAGEMENT (OUTPATIENT)
Dept: ADMINISTRATIVE | Facility: OTHER | Age: 69
End: 2024-09-12
Payer: MEDICARE

## 2024-09-13 ENCOUNTER — TELEPHONE (OUTPATIENT)
Dept: INTERNAL MEDICINE | Facility: CLINIC | Age: 69
End: 2024-09-13
Payer: MEDICARE

## 2024-09-13 DIAGNOSIS — B37.9 YEAST INFECTION: ICD-10-CM

## 2024-09-13 DIAGNOSIS — N39.0 COMPLICATED UTI (URINARY TRACT INFECTION): Primary | ICD-10-CM

## 2024-09-13 RX ORDER — NITROFURANTOIN 25; 75 MG/1; MG/1
100 CAPSULE ORAL 2 TIMES DAILY
Qty: 28 CAPSULE | Refills: 0 | Status: SHIPPED | OUTPATIENT
Start: 2024-09-13 | End: 2024-09-27

## 2024-09-13 NOTE — TELEPHONE ENCOUNTER
Patient advised, I stressed the importance of completing the antibiotics. Patient verbalized understanding. While speaking to Mrs. Hawley she states that she has a yeast infection and has tried OTC medications but nothing has helped. Patient states she doesn't have a GYN doctor to ask for a medication to treat the yeast infection. Patient would like to know if you can send in some Diflucan? Please advise

## 2024-09-13 NOTE — TELEPHONE ENCOUNTER
Please call her.  She has a urinary tract infection based on a sample drawn 2 days ago.  I sent in a course of antibiotics for her.  Please ask her to take the entire course

## 2024-09-14 DIAGNOSIS — G47.01 INSOMNIA DUE TO MEDICAL CONDITION: Chronic | ICD-10-CM

## 2024-09-14 LAB — BACTERIA UR CULT: ABNORMAL

## 2024-09-14 NOTE — TELEPHONE ENCOUNTER
Care Due:                  Date            Visit Type   Department     Provider  --------------------------------------------------------------------------------                                EP -                              PRIMARY      [facility] INTERNAL  Last Visit: 07-      CARE ([facility])   GAY Hodges                              EP -                              PRIMARY      [facility] INTER[facility]  Next Visit: 09-      CARE ([facility])   GAY Hodges                                                            Last  Test          Frequency    Reason                     Performed    Due Date  --------------------------------------------------------------------------------    Lipid Panel.  12 months..  atorvastatin.............  05- 05-    [facility] Embedded Care Due Messages. Reference number: [id number].   9/14/2024 12:03:55 PM CDT

## 2024-09-14 NOTE — TELEPHONE ENCOUNTER
----- Message from Louise Diaz sent at 5/10/2017  9:43 AM CDT -----  Contact: Pt's  Dangelo Hawley  Pt's , Dangelo,  would like to be called back regarding pt continuously falling.        Please call Dangelo at [phone].        Thanks!  
Spoke to patients  who states she is constantly falling and very disoriented. Offered patient an uc appointment.  states he is just going to bring her to the ED.   
unknown

## 2024-09-16 ENCOUNTER — HOSPITAL ENCOUNTER (OUTPATIENT)
Dept: WOUND CARE | Facility: HOSPITAL | Age: 69
Discharge: HOME | End: 2024-09-16
Attending: PREVENTIVE MEDICINE
Payer: MEDICARE

## 2024-09-16 ENCOUNTER — OUTPATIENT CASE MANAGEMENT (OUTPATIENT)
Dept: ADMINISTRATIVE | Facility: OTHER | Age: 69
End: 2024-09-16
Payer: MEDICARE

## 2024-09-16 VITALS
BODY MASS INDEX: 41.48 KG/M2 | SYSTOLIC BLOOD PRESSURE: 127 MMHG | HEART RATE: 84 BPM | DIASTOLIC BLOOD PRESSURE: 60 MMHG | TEMPERATURE: 98 F | HEIGHT: 64 IN | WEIGHT: 242.94 LBS

## 2024-09-16 DIAGNOSIS — M21.42 BILATERAL PES PLANUS: ICD-10-CM

## 2024-09-16 DIAGNOSIS — I89.0 LYMPHEDEMA OF BOTH LOWER EXTREMITIES: ICD-10-CM

## 2024-09-16 DIAGNOSIS — E11.610 CHARCOT FOOT DUE TO DIABETES MELLITUS: ICD-10-CM

## 2024-09-16 DIAGNOSIS — L97.522 CHRONIC ULCER OF LEFT FOOT WITH FAT LAYER EXPOSED: Primary | ICD-10-CM

## 2024-09-16 DIAGNOSIS — M21.41 BILATERAL PES PLANUS: ICD-10-CM

## 2024-09-16 PROCEDURE — 29581 APPL MULTLAYER CMPRN SYS LEG: CPT

## 2024-09-16 RX ORDER — QUETIAPINE FUMARATE 100 MG/1
TABLET, FILM COATED ORAL
Qty: 180 TABLET | Refills: 3 | OUTPATIENT
Start: 2024-09-16

## 2024-09-16 RX ORDER — FLUCONAZOLE 150 MG/1
150 TABLET ORAL DAILY
Qty: 3 TABLET | Refills: 0 | Status: SHIPPED | OUTPATIENT
Start: 2024-09-16 | End: 2024-09-19

## 2024-09-16 NOTE — PROGRESS NOTES
"                     Wound Care & Hyperbaric Medicine    Subjective:       Patient ID: Tasha Hawley is a 69 y.o. female.    Chief Complaint: Wound Care and Wound Check    9/16/2024  Follow up related to left plantar foot wound. Patient is having 10/10 pain to feet and legs, "I can't walk", states that she has fallen four times since her last appointment, denies hitting her head, no new wounds. Current UTI, complaining of chills, has not been able to  her Diflucan yet. Patient is concerned that the cast padding is sticking to her skin and hurts on removal, concerned about the hydrofera blue reacting with her skin. Plan of care updated. Orders faxed to home health.        Review of Systems   All other systems reviewed and are negative.        Objective:     Vitals:    09/16/24 1355   BP: 127/60   Pulse: 84   Temp: 97.8 °F (36.6 °C)         Physical Exam       Wound 05/29/24 1448 Non pressure chronic ulcer Left plantar Foot (Active)   05/29/24 1448   Present on Original Admission: N   Primary Wound Type: Non pressure   Side: Left   Orientation: plantar   Location: Foot   Wound Approximate Age at First Assessment (Weeks):    Wound Number:    Is this injury device related?:    Incision Type:    Closure Method:    Wound Description (Comments):    Type:    Additional Comments:    Ankle-Brachial Index:    Pulses:    Removal Indication and Assessment:    Wound Outcome:    Wound Image   09/16/24 1330   Dressing Appearance Moist drainage 09/16/24 1330   Drainage Amount Scant 09/16/24 1330   Drainage Characteristics/Odor Serosanguineous 09/16/24 1330   Appearance Pink;Moist;Not granulating 09/16/24 1330   Tissue loss description Full thickness 09/16/24 1330   Red (%), Wound Tissue Color 100 % 09/16/24 1330   Periwound Area Macerated;Intact 09/16/24 1330   Wound Edges Open 09/16/24 1330   Wound Length (cm) 1.3 cm 09/16/24 1330   Wound Width (cm) 1.5 cm 09/16/24 1330   Wound Depth (cm) 0.2 cm 09/16/24 1330   Wound " Volume (cm^3) 0.39 cm^3 09/16/24 1330   Wound Surface Area (cm^2) 1.95 cm^2 09/16/24 1330   Care Cleansed with:;Soap and water;Antimicrobial agent 09/16/24 1330   Dressing Applied;Silver;Calcium alginate;Absorptive Pad;Rolled gauze;Compression wrap 09/16/24 1330   Periwound Care Absorptive dressing applied 09/16/24 1330   Compression Two layer compression 09/16/24 1330   Off Loading Football dressing;Off loading shoe 09/16/24 1330   Dressing Change Due 09/19/24 09/16/24 1330            Wound 08/19/24 1421 Non pressure chronic ulcer Right plantar Foot (Active)   08/19/24 1421   Present on Original Admission:    Primary Wound Type: Non pressure   Side: Right   Orientation: plantar   Location: Foot   Wound Approximate Age at First Assessment (Weeks):    Wound Number:    Is this injury device related?:    Incision Type:    Closure Method:    Wound Description (Comments):    Type:    Additional Comments:    Ankle-Brachial Index:    Pulses:    Removal Indication and Assessment:    Wound Outcome:    Wound Image   09/16/24 1330   Dressing Appearance Dry;Intact;Clean 09/16/24 1330   Drainage Amount None 09/16/24 1330   Appearance Closed/resurfaced 09/16/24 1330   Tissue loss description Not applicable 09/16/24 1330   Periwound Area Intact;Dry 09/16/24 1330   Wound Edges Rolled/closed 09/16/24 1330   Wound Length (cm) 0 cm 09/16/24 1330   Wound Width (cm) 0 cm 09/16/24 1330   Wound Depth (cm) 0 cm 09/16/24 1330   Wound Volume (cm^3) 0 cm^3 09/16/24 1330   Wound Surface Area (cm^2) 0 cm^2 09/16/24 1330   Care Cleansed with:;Soap and water;Antimicrobial agent 09/16/24 1330   Dressing Applied;Rolled gauze;Absorptive Pad;Compression wrap 09/16/24 1330   Periwound Care Absorptive dressing applied 09/16/24 1330   Compression Two layer compression 09/16/24 1330   Off Loading Football dressing;Off loading shoe 09/16/24 1330   Dressing Change Due 09/19/24 09/16/24 1330         Assessment/Plan:         ICD-10-CM ICD-9-CM   1. Chronic  ulcer of left foot with fat layer exposed  L97.522 707.15   2. Lymphedema of both lower extremities  I89.0 457.1       Will need podiatry follow up for pes planus/charcot    Tissue pathology and/or culture taken:  [] Yes [x] No   Sharp debridement performed:   [] Yes [x] No   Labs ordered this visit:   [] Yes [x] No   Imaging ordered this visit:   [] Yes [x] No           Orders Placed This Encounter   Procedures    Change dressing     Left plantar foot  Cleanse wound with: Vashe  Lidocaine:prn  Silver nitrate:prn  Periwound care: gentian violet prn  Primary dressing: silver alginate  Secondary dressin ABD  Offloading: football dressing with 2 kerlix    Right plantar foot  Cleanse with: water and soap  Lidocaine:prn  Silver nitrate:prn  Periwound care:prn  Primary dressin ABD  Offloading: football dressing with 2 kerlix    Edema control: Coflex with zinc from toes to back of knee to bilateral legs  Frequency: twice weekly  Follow-up: 2 weeks Dr Baltazar  [employer] [facility]: please provide skilled wound care for patient as above 2 x week when not seen in clinic, weekly when seen in clinic.        Follow up in about 15 days (around 10/1/2024) for Dr Baltazar.            This includes face to face time and non-face to face time preparing to see the patient (eg, review of tests), obtaining and/or reviewing separately obtained history, documenting clinical information in the electronic or other health record, independently interpreting results and communicating results to the patient/family/caregiver, or care coordinator.

## 2024-09-17 ENCOUNTER — TELEPHONE (OUTPATIENT)
Dept: INTERNAL MEDICINE | Facility: CLINIC | Age: 69
End: 2024-09-17
Payer: MEDICARE

## 2024-09-17 ENCOUNTER — OUTPATIENT CASE MANAGEMENT (OUTPATIENT)
Dept: ADMINISTRATIVE | Facility: OTHER | Age: 69
End: 2024-09-17
Payer: MEDICARE

## 2024-09-17 NOTE — TELEPHONE ENCOUNTER
----- Message from Karla Barboza sent at 9/17/2024 12:41 PM CDT -----  Contact: [phone]  Patient called, requested a courtesy call from Dr Hodges's medical assistant to find out if Rx will be call. Thank you.

## 2024-09-17 NOTE — TELEPHONE ENCOUNTER
----- Message from Paola SHREE Medrano sent at 9/17/2024  9:03 AM CDT -----  Contact: Pt [phone]  Requesting an RX refill or new RX.    Is this a refill or new RX: New    RX name and strength (copy/paste from chart):  Hydrocodone/Acetaminophen 10/325 mg four times a day.     Is this a 30 day or 90 day RX: Temp supply until pain management doctor gets back next week.     Pharmacy name and phone # (copy/paste from chart):    [facility] [place]  [address] - [address]  [address]  [place] [address]  Phone: [phone] Fax: [phone]    Please call and advise.     Thank you.

## 2024-09-17 NOTE — TELEPHONE ENCOUNTER
Spoke to patient who states she wants to Dr. Hodges to send in a refill of her pain medication that she just got 2 weeks ago. She states her pain doctor isn't in town to refill it. I called and spoke to the pharmacy who states she has a refill on file due on the 2nd. Dr. Hodges will not refill that medication for her. I advised patient to call the pain management office.

## 2024-09-17 NOTE — PROGRESS NOTES
Outpatient Care Management  Plan of Care Follow Up Visit    Patient: Tasha Hawley  MRN: [id number]  Date of Service: 09/17/2024  Completed by: Caitlin Ordaz RN  Referral Date: 05/24/2024    Reason for Visit   Patient presents with    Update Plan Of Care    OPCM Chart Review       Brief Summary:   OPCM RN follow-up call completed.   Continue education on Wd/Lymphedema, Pain.   Next Steps: Patient is in agreement to follow-up call on or around 9/23/2024

## 2024-09-18 ENCOUNTER — TELEPHONE (OUTPATIENT)
Dept: INTERNAL MEDICINE | Facility: CLINIC | Age: 69
End: 2024-09-18
Payer: MEDICARE

## 2024-09-18 ENCOUNTER — PATIENT MESSAGE (OUTPATIENT)
Dept: PSYCHIATRY | Facility: CLINIC | Age: 69
End: 2024-09-18
Payer: MEDICARE

## 2024-09-18 NOTE — TELEPHONE ENCOUNTER
Spoke to patient yesterday evening, patient stated she was out of her pain medication hydrocodone as of that morning. After speaking to Mrs. Hawely for about 10 minuets as to why she was out of medication so soon she stated she didn't want to upset Dr. Hodges and didn't want me to ask him to fill her medication. Patient states she is out of medication because she has been taking the pain medication for her leg and feet pain. I spoke to the pharmacy and they stated her last prescription was picked up on September 3rd and isn't dune until October 2nd. Prescription is already at the pharmacy to be filled on the 2nd. Would you be able to give Mrs. Hawley a short supply of her pain medication until her provider comes back to town on 09/30? Please advise

## 2024-09-18 NOTE — TELEPHONE ENCOUNTER
"----- Message from Caitlin Ordaz RN sent at 9/17/2024  4:55 PM CDT -----  Regarding: Pain to LEs- especially left foot  Dr. Mesfin Hodges/Staff:    Ms. Hawley reports a nurse already asked Dr. Hodges for a Vicodin DS  mg refill but was denied. I don't see Vicodin (Hydrocodone Acetaminophen) in the chart.   She has been taking Vicodin with Tramadol every 12 hours. C/o "real bad" pain to wd left ankle/heel -sharp, stabbing, throbbing pain, made worse by not having Vicodin to take with Tramadol. Can she substitute OTC Tylenol ES 1-2 tabs for the Vicodin?      Ms. Hawley finds herself in a difficult situation. Fortunately, [facility] is providing wound care visits 2 x wk for left ankle/foot. Dr. Hillman, Non [facility] is out -of -clinic until 9/30. Dr. Marlon Wylie,  Care does not prescribe pain meds.     Any advise is appreciated.    Thank you,    ADOLPH Pruett, RN, CCM [facility]  [email or web address]  TEL:  [phone]  "

## 2024-09-18 NOTE — TELEPHONE ENCOUNTER
Please call the patient.  Unfortunately, I will not be able to provide an early refill of her narcotic prescription.  This is a controlled substance that is highly addictive and dangerous to take in high amounts.      She can use over the counter tylenol and topical agents to help with pain until refills are available.  She must be more responsible with narcotic use in the future.    D

## 2024-09-20 ENCOUNTER — TELEPHONE (OUTPATIENT)
Dept: INTERNAL MEDICINE | Facility: CLINIC | Age: 69
End: 2024-09-20
Payer: MEDICARE

## 2024-09-20 NOTE — TELEPHONE ENCOUNTER
----- Message from Karla Barboza sent at 9/20/2024  9:39 AM CDT -----  Contact: [phone]  Patient called, requested a courtesy call from Dr Hodges's office - did not specify. Thank you.

## 2024-09-23 PROBLEM — J96.11 CHRONIC RESPIRATORY FAILURE WITH HYPOXIA AND HYPERCAPNIA: Status: RESOLVED | Noted: 2024-02-10 | Resolved: 2024-09-23

## 2024-09-23 PROBLEM — J96.12 CHRONIC RESPIRATORY FAILURE WITH HYPOXIA AND HYPERCAPNIA: Status: RESOLVED | Noted: 2024-02-10 | Resolved: 2024-09-23

## 2024-09-26 ENCOUNTER — TELEPHONE (OUTPATIENT)
Dept: INTERNAL MEDICINE | Facility: CLINIC | Age: 69
End: 2024-09-26
Payer: MEDICARE

## 2024-09-26 NOTE — TELEPHONE ENCOUNTER
Please call Shawna at [facility].  I cannot manage this rash around her suprapubic catheter - this is outside of my scope of practice.  She needs to see urology or at least talk to them about it.      She has been falling at home on a daily basis (or more) for many months now.  Can you give a verbal order authorizing more home PT, please?    D

## 2024-09-26 NOTE — TELEPHONE ENCOUNTER
Shawna, from [facility], wanted to let you know that the patient's suprapubic area is very red and irritated and is blistering a bit. Shawna also stated there is a very foul odor. What would be the next step? The diflucan didn't help. Patient doesn't see urology for another month. [facility] is requesting an order for outpatient PT stating the patient has fallen almost everyday. Please advise

## 2024-09-26 NOTE — TELEPHONE ENCOUNTER
----- Message from Charla Clark sent at 9/24/2024 12:26 PM CDT -----  Contact: [phone]  1MEDICALADVICE     Patient is calling for Medical Advice regarding: Pt said she needs a call back about meds for the yeast infection is not working     How long has patient had these symptoms:1 week     Pharmacy name and phone#:  [facility] [place] [facility] [place] - [address]  [address] [facility]  [place] [address]  Phone: [phone] Fax: [phone]    Patient wants a call back or thru [facility]: call back     Comments:    Please advise patient replies from provider may take up to 48 hours.

## 2024-09-26 NOTE — TELEPHONE ENCOUNTER
----- Message from Paola Medrano sent at 9/24/2024  4:20 PM CDT -----  Contact: Shawna, [facility], [phone]  Shawna, from [facility], wanted to let you know that the patient's suprapubic area is very red and irritated and is blistering a bit. She also stated that the patient has a stye in her right eye.     Please call and advise.     Thank you.

## 2024-10-01 ENCOUNTER — TELEPHONE (OUTPATIENT)
Dept: WOUND CARE | Facility: HOSPITAL | Age: 69
End: 2024-10-01

## 2024-10-01 NOTE — TELEPHONE ENCOUNTER
Patient had a 1100 appointment at the [facility] with Dr Baltazar. At 1121 attempted to call patient's mobile, v/mail is full. 1123 attempted work number, left v/mail.

## 2024-10-05 NOTE — PLAN OF CARE
VN note: progress notes, labs and vital signs reviewed. Will be available to intervene if needed.      Statement Selected

## 2024-10-08 ENCOUNTER — DOCUMENT SCAN (OUTPATIENT)
Dept: HOME HEALTH SERVICES | Facility: HOSPITAL | Age: 69
End: 2024-10-08
Payer: MEDICARE

## 2024-10-11 DIAGNOSIS — G47.01 INSOMNIA DUE TO MEDICAL CONDITION: Chronic | ICD-10-CM

## 2024-10-11 RX ORDER — QUETIAPINE FUMARATE 50 MG/1
50 TABLET, FILM COATED ORAL NIGHTLY
Qty: 60 TABLET | Refills: 3 | Status: SHIPPED | OUTPATIENT
Start: 2024-10-11

## 2024-10-11 NOTE — TELEPHONE ENCOUNTER
No care due was identified.  [facility] Embedded Care Due Messages. Reference number: [id number].   10/11/2024 1:45:18 PM CDT

## 2024-10-11 NOTE — TELEPHONE ENCOUNTER
----- Message from Charla sent at 10/11/2024 12:17 PM CDT -----  Contact: [phone]  Requesting an RX refill or new RX.    Is this a refill or new RX: Refill    RX name and strength (copy/paste from chart):  QUEtiapine (SEROQUEL) 50 MG tablet    Is this a 30 day or 90 day RX:     Pharmacy name and phone # (copy/paste from chart):    [facility]  [place]  [address]  [address]  [place] [address]  Phone: [phone] Fax: [phone]    The doctors have asked that we provide their patients with the following 2 reminders -- prescription refills can take up to 72 hours, and a friendly reminder that in the future you can use your [facility] account to request refills: call back         Pt said she get one QUEtiapine (SEROQUEL) 25 MG tablet and other is QUEtiapine (SEROQUEL) 100 MG tablet

## 2024-10-14 ENCOUNTER — OUTPATIENT CASE MANAGEMENT (OUTPATIENT)
Dept: ADMINISTRATIVE | Facility: OTHER | Age: 69
End: 2024-10-14
Payer: MEDICARE

## 2024-10-14 ENCOUNTER — TELEPHONE (OUTPATIENT)
Dept: INTERNAL MEDICINE | Facility: CLINIC | Age: 69
End: 2024-10-14
Payer: MEDICARE

## 2024-10-14 NOTE — TELEPHONE ENCOUNTER
----- Message from Angely sent at 10/14/2024  1:17 PM CDT -----  Contact: arun with [facility] [phone]  .1MEDICALADVICE     Patient is calling for Medical Advice regarding: Shawna with [facility] [phone] calling about pt.  She saw pt on Friday 10/11/24 and her urine was cloudy with odor do you want her to get a UA or do you want to give her antibiotics.  Can please her back and advise.  Her suprapudic was ir rated  and flame. Please call her back and advise    How long has patient had these symptoms:    Pharmacy name and phone#:    Patient wants a call back or thru [facility]:callback    Comments:    Please advise patient replies from provider may take up to 48 hours.

## 2024-10-14 NOTE — TELEPHONE ENCOUNTER
----- Message from Prospertonya sent at 10/14/2024 12:17 PM CDT -----  Contact: [phone]  .1MEDICALADVICE     Patient is calling for Medical Advice regarding: Pt said she has been calling for refills for weeks with no call back an no meds send pt wants a call back today     How long has patient had these symptoms:    Pharmacy name and phone#:  [facility] [place] [facility] [place] - [address]  [address]  [place] [address]  Phone: [phone] Fax: [phone]      Patient wants a call back or thru [facility]:call back     Comments:Pt said she get one QUEtiapine (SEROQUEL) 25 MG tablet and other is QUEtiapine (SEROQUEL) 100 MG tablet also needs   hydrocortisone (CORTEF) 10 MG Tab and hydrocortisone (CORTEF) 5 MG Tab  Please advise patient replies from provider may take up to 48 hours.

## 2024-10-15 ENCOUNTER — OUTPATIENT CASE MANAGEMENT (OUTPATIENT)
Dept: ADMINISTRATIVE | Facility: OTHER | Age: 69
End: 2024-10-15
Payer: MEDICARE

## 2024-10-15 ENCOUNTER — OFFICE VISIT (OUTPATIENT)
Dept: INTERNAL MEDICINE | Facility: CLINIC | Age: 69
End: 2024-10-15
Payer: MEDICARE

## 2024-10-15 VITALS — DIASTOLIC BLOOD PRESSURE: 68 MMHG | OXYGEN SATURATION: 95 % | SYSTOLIC BLOOD PRESSURE: 110 MMHG | HEART RATE: 65 BPM

## 2024-10-15 DIAGNOSIS — F33.0 MAJOR DEPRESSIVE DISORDER, RECURRENT, MILD: ICD-10-CM

## 2024-10-15 DIAGNOSIS — R53.81 PHYSICAL DECONDITIONING: ICD-10-CM

## 2024-10-15 DIAGNOSIS — L97.529 ULCER OF BOTH FEET, UNSPECIFIED ULCER STAGE: ICD-10-CM

## 2024-10-15 DIAGNOSIS — R29.6 FREQUENT FALLS: Chronic | ICD-10-CM

## 2024-10-15 DIAGNOSIS — I50.32 CHRONIC DIASTOLIC HEART FAILURE: ICD-10-CM

## 2024-10-15 DIAGNOSIS — G89.4 CHRONIC PAIN SYNDROME: Chronic | ICD-10-CM

## 2024-10-15 DIAGNOSIS — L97.519 ULCER OF BOTH FEET, UNSPECIFIED ULCER STAGE: ICD-10-CM

## 2024-10-15 DIAGNOSIS — F11.20 NARCOTIC DEPENDENCY, CONTINUOUS: Chronic | ICD-10-CM

## 2024-10-15 DIAGNOSIS — I89.0 LYMPHEDEMA OF BOTH LOWER EXTREMITIES: ICD-10-CM

## 2024-10-15 DIAGNOSIS — E27.40 ADRENAL INSUFFICIENCY: ICD-10-CM

## 2024-10-15 DIAGNOSIS — G47.01 INSOMNIA DUE TO MEDICAL CONDITION: Primary | Chronic | ICD-10-CM

## 2024-10-15 DIAGNOSIS — L57.0 ACTINIC KERATOSES: ICD-10-CM

## 2024-10-15 DIAGNOSIS — Z93.59 SUPRAPUBIC CATHETER: Chronic | ICD-10-CM

## 2024-10-15 PROCEDURE — 3074F SYST BP LT 130 MM HG: CPT | Mod: CPTII,S$GLB,, | Performed by: INTERNAL MEDICINE

## 2024-10-15 PROCEDURE — 1159F MED LIST DOCD IN RCRD: CPT | Mod: CPTII,S$GLB,, | Performed by: INTERNAL MEDICINE

## 2024-10-15 PROCEDURE — 3078F DIAST BP <80 MM HG: CPT | Mod: CPTII,S$GLB,, | Performed by: INTERNAL MEDICINE

## 2024-10-15 PROCEDURE — 1160F RVW MEDS BY RX/DR IN RCRD: CPT | Mod: CPTII,S$GLB,, | Performed by: INTERNAL MEDICINE

## 2024-10-15 PROCEDURE — 1125F AMNT PAIN NOTED PAIN PRSNT: CPT | Mod: CPTII,S$GLB,, | Performed by: INTERNAL MEDICINE

## 2024-10-15 PROCEDURE — 99214 OFFICE O/P EST MOD 30 MIN: CPT | Mod: S$GLB,,, | Performed by: INTERNAL MEDICINE

## 2024-10-15 PROCEDURE — 3044F HG A1C LEVEL LT 7.0%: CPT | Mod: CPTII,S$GLB,, | Performed by: INTERNAL MEDICINE

## 2024-10-15 PROCEDURE — G2211 COMPLEX E/M VISIT ADD ON: HCPCS | Mod: S$GLB,,, | Performed by: INTERNAL MEDICINE

## 2024-10-15 PROCEDURE — 3288F FALL RISK ASSESSMENT DOCD: CPT | Mod: CPTII,S$GLB,, | Performed by: INTERNAL MEDICINE

## 2024-10-15 PROCEDURE — 1101F PT FALLS ASSESS-DOCD LE1/YR: CPT | Mod: CPTII,S$GLB,, | Performed by: INTERNAL MEDICINE

## 2024-10-15 PROCEDURE — 99999 PR PBB SHADOW E&M-EST. PATIENT-LVL IV: CPT | Mod: PBBFAC,,, | Performed by: INTERNAL MEDICINE

## 2024-10-15 RX ORDER — HYDROCODONE BITARTRATE AND ACETAMINOPHEN 10; 325 MG/1; MG/1
1 TABLET ORAL
COMMUNITY

## 2024-10-15 RX ORDER — QUETIAPINE FUMARATE 100 MG/1
100 TABLET, FILM COATED ORAL NIGHTLY
Qty: 90 TABLET | Refills: 3 | Status: SHIPPED | OUTPATIENT
Start: 2024-10-15 | End: 2025-10-10

## 2024-10-15 RX ORDER — DOXYCYCLINE 100 MG/1
100 CAPSULE ORAL EVERY 12 HOURS
COMMUNITY

## 2024-10-15 RX ORDER — BUTALBITAL, ASPIRIN, AND CAFFEINE 325; 50; 40 MG/1; MG/1; MG/1
1 CAPSULE ORAL EVERY 4 HOURS PRN
COMMUNITY

## 2024-10-15 RX ORDER — QUETIAPINE FUMARATE 25 MG/1
25 TABLET, FILM COATED ORAL DAILY
Qty: 90 TABLET | Refills: 3 | Status: SHIPPED | OUTPATIENT
Start: 2024-10-15 | End: 2025-10-15

## 2024-10-15 RX ORDER — AMITRIPTYLINE HYDROCHLORIDE 25 MG/1
25 TABLET, FILM COATED ORAL NIGHTLY
Qty: 90 TABLET | Refills: 3 | Status: SHIPPED | OUTPATIENT
Start: 2024-10-15

## 2024-10-15 RX ORDER — TRAZODONE HYDROCHLORIDE 300 MG/1
300 TABLET ORAL NIGHTLY
COMMUNITY

## 2024-10-15 RX ORDER — HYDROCORTISONE 5 MG/1
5 TABLET ORAL
Qty: 90 TABLET | Refills: 3 | Status: SHIPPED | OUTPATIENT
Start: 2024-10-15 | End: 2025-10-10

## 2024-10-15 RX ORDER — HYDROCORTISONE 10 MG/1
10 TABLET ORAL DAILY
Qty: 90 TABLET | Refills: 3 | Status: SHIPPED | OUTPATIENT
Start: 2024-10-15 | End: 2025-10-10

## 2024-10-15 NOTE — PROGRESS NOTES
Subjective:       Patient ID: Tasha Hawley is a 69 y.o. female.    Chief Complaint:   Follow-up    HPI - she is doing somewhat better today, though as usual she has a long list of issues to discuss.  She needs refills on her quetiapine and Elavil for sleep.  She continues to state that she has difficulty sleeping.  Psychiatry has started a daytime dose of quetiapine to help with her mood.  She continues to fall at home.  Her  is much older and also frail.  He has difficulty picking her up.  She has wound care coming out to wrap her legs for lymphedema and to care for bilateral foot ulcers.  She asked for refills on her steroids for adrenal insufficiency.  She has an indwelling pump and oral hydrocodone which are filled by an outside pain management group.  I disagree with this management plan and will not fill the hydrocodone.  She continues to have a suprapubic catheter.  She wants me to look at a rash on her arms.  She has an appointment with derm in the next couple of weeks.  She was unable to get out of the wheelchair to stand on the scale today.  She agreed to take the COVID and influenza vaccinations today.  She does not smoke cigarettes    PMH:  Multiple SDOH interfering with care  Neurogenic bladder, with recurrent UTI's. Suprapubic catheter  CAD, with ACS in Jan 2016 - subtot mid LAD lesion without PCI; ischemic CM with EF 40% and chronic LE edema. Followed by [facility]  Chronic insomnia  Gross Lymphedema bilateral LE's with intermittent weeping and skin breakdown  Chronic low back pain with indwelling narcotic pump  History CVA with dysarthria and swallowing difficulty  Hypothyroidism  CECI, on CPAP  Anxiety and Depression  Chronic constipation, d/t ongoing narcotic use.  Dependent on home oxygen     Meds: Reviewed and reconciled in EPIC with patient during visit today.    Review of Systems   Constitutional:  Negative for fever.   HENT:  Negative for congestion.    Respiratory:   Negative for shortness of breath.    Cardiovascular:  Negative for chest pain.   Gastrointestinal:  Positive for constipation.   Genitourinary:  Negative for difficulty urinating.   Musculoskeletal:  Positive for arthralgias and back pain.   Skin:  Positive for rash.   Neurological:  Negative for headaches.   Psychiatric/Behavioral:  Positive for sleep disturbance.        Objective:      Physical Exam  Constitutional:       Appearance: She is obese.      Comments: Friendly, in good spirits today.  Accompanied by her    HENT:      Head: Normocephalic and atraumatic.   Cardiovascular:      Rate and Rhythm: Normal rate and regular rhythm.      Heart sounds: No murmur heard.  Pulmonary:      Effort: Pulmonary effort is normal.      Breath sounds: No wheezing, rhonchi or rales.   Skin:     Comments: Multiple AKS across bilateral forearms   Neurological:      Mental Status: She is alert. Mental status is at baseline.   Psychiatric:         Mood and Affect: Mood normal.         Assessment:       1. Insomnia due to medical condition    2. Frequent falls    3. Lymphedema of both lower extremities    4. Ulcer of both feet, unspecified ulcer stage    5. Adrenal insufficiency    6. Chronic diastolic heart failure    7. Chronic pain syndrome    8. Major depressive disorder, recurrent, mild    9. Narcotic dependency, continuous    10. Physical deconditioning    11. Suprapubic catheter    12. Actinic keratoses        Plan:       Tasha was seen today for follow-up.    Diagnoses and all orders for this visit:    Insomnia due to medical condition - this is as well managed as we can.  I provided refills.  Monitor  -     QUEtiapine (SEROQUEL) 100 MG Tab; Take 1 tablet (100 mg total) by mouth nightly.  -     QUEtiapine (SEROQUEL) 25 MG Tab; Take 1 tablet (25 mg total) by mouth once daily.  -     amitriptyline (ELAVIL) 25 MG tablet; Take 1 tablet (25 mg total) by mouth every evening.    Frequent falls - a longstanding problem.   Her  can not care for her but she refuses to go to a facility.  Monitor    Lymphedema of both lower extremities - seems improved today.  The legs were wrapped tightly, and I did not see a need to unwrap them today.    Ulcer of both feet, unspecified ulcer stage - stable.  Wound Care is treating.  I will continue to monitor and provide orders as necessary    Adrenal insufficiency - stable on treatment.  Refills today labs today  -     hydrocortisone (CORTEF) 10 MG Tab; Take 1 tablet (10 mg total) by mouth once daily.  -     hydrocortisone (CORTEF) 5 MG Tab; Take 1 tablet (5 mg total) by mouth before dinner.    Chronic diastolic heart failure - stable on treatment.  Labs today  -     Lipid panel; Future  -     COMPREHENSIVE METABOLIC PANEL; Future    Chronic pain syndrome - stable.  Narcotics per outside pain management.  I will not refill anything other than tramadol.    Major depressive disorder, recurrent, mild - stable today.  She is in a good mood.  Continue to monitor and continue current meds    Narcotic dependency, continuous    Physical deconditioning    Suprapubic catheter    Actinic keratoses - longstanding.  She is seeing derm for treatment.  I encouraged her to keep this appointment    RTC p.r.n., or in 3 months    PAPO Hodges MD MPH  Staff Internist

## 2024-10-16 ENCOUNTER — OUTPATIENT CASE MANAGEMENT (OUTPATIENT)
Dept: ADMINISTRATIVE | Facility: OTHER | Age: 69
End: 2024-10-16
Payer: MEDICARE

## 2024-10-16 NOTE — PROGRESS NOTES
Outpatient Care Management  Plan of Care Follow Up Visit    Patient: Tasha Hawley  MRN: [id number]  Date of Service: 10/16/2024  Completed by: Caitlin Ordaz RN  Referral Date: 05/24/2024    Reason for Visit   Patient presents with    Update Plan Of Care    Case Closure    OPCM Chart Review       Brief Summary:   OPCM RN follow-up call completed.   Completed Care Plans - Wound/HF/Chronic Pain/Prevent Harm.  No further needs for OPCM identified by Tasha. Tasha understands she may call should future needs/care coordination arise.   She agrees to case closure.

## 2024-10-18 ENCOUNTER — TELEPHONE (OUTPATIENT)
Dept: UROLOGY | Facility: CLINIC | Age: 69
End: 2024-10-18
Payer: MEDICARE

## 2024-10-18 ENCOUNTER — TELEPHONE (OUTPATIENT)
Dept: INTERNAL MEDICINE | Facility: CLINIC | Age: 69
End: 2024-10-18
Payer: MEDICARE

## 2024-10-18 NOTE — TELEPHONE ENCOUNTER
----- Message from Saba sent at 10/16/2024  9:22 AM CDT -----  Contact: [phone] Ext#[phone] @ from [facility]  Good morning  from [facility] would like to request PT, OPT, and to continue home health orders for the patient. Please call  to advise [phone] Ext#[phone]

## 2024-10-18 NOTE — TELEPHONE ENCOUNTER
Spoke to patient and notified her that she has not seen Dr. Armstrong yet so he cannot evaluate her until she is established with him, also told her that he does not have any sooner appointments than her 11/29 appointment, instructed her to go to ER or urgent care to be evaluated for her symptoms

## 2024-10-18 NOTE — TELEPHONE ENCOUNTER
----- Message from Almita sent at 10/18/2024  9:53 AM CDT -----  Type:  Sooner Apoointment Request    Caller is requesting a sooner appointment..  Caller will accept being placed on the waitlist and is requesting a message be sent to doctor.  Name of Caller:pt  When is the first available appointment?11/2024  Symptoms: Continuous leakage of urine unable to control   Would the patient rather a call back or a response via [facility]? call  Best Call Back Number: [phone]  Additional Information: please call   Pt would like a same day appointment   Symptom: Urine Symptoms  Outcome: Schedule a same-day appointment or talk to a nurse or provider within 1 hour.  Reason: Caller denied all higher acuity questions    The caller accepted this outcome.

## 2024-10-21 ENCOUNTER — TELEPHONE (OUTPATIENT)
Dept: UROLOGY | Facility: CLINIC | Age: 69
End: 2024-10-21
Payer: MEDICARE

## 2024-10-21 PROCEDURE — G0179 MD RECERTIFICATION HHA PT: HCPCS | Mod: ,,, | Performed by: INTERNAL MEDICINE

## 2024-10-21 NOTE — TELEPHONE ENCOUNTER
Pt informed of the below message again    Spoke to patient and notified her that she has not seen Dr. Armstrong yet so he cannot evaluate her until she is established with him, also told her that he does not have any sooner appointments than her 11/29 appointment, instructed her to go to ER or urgent care to be evaluated for her symptoms

## 2024-10-21 NOTE — TELEPHONE ENCOUNTER
----- Message from Emilie sent at 10/21/2024  2:52 PM CDT -----  Type:  Needs Medical Advice    Who Called: Pt   Symptoms (please be specific): urinating    How long has patient had these symptoms:  all weekend  Pharmacy name and phone #:    Would the patient rather a call back or a response via [facility]? Call back   Best Call Back Number: [phone]  Additional Information: pt states she is urinating outside her tube and can't stop... needs botox ...would like to be seen ASAP

## 2024-10-22 ENCOUNTER — TELEPHONE (OUTPATIENT)
Dept: UROLOGY | Facility: CLINIC | Age: 69
End: 2024-10-22
Payer: MEDICARE

## 2024-10-22 NOTE — TELEPHONE ENCOUNTER
I spoke with the patient again for the SP tub concern and she was informed again that she will have to wait to see Dr Armstrong in Nov. She will be a new patient to Dr Armstrong. She was advised to go to the ER for current issue , she declined. Pt also states that she was discharged for Dr Maria Esther walter.

## 2024-10-23 ENCOUNTER — TELEPHONE (OUTPATIENT)
Dept: UROLOGY | Facility: CLINIC | Age: 69
End: 2024-10-23
Payer: MEDICARE

## 2024-10-23 ENCOUNTER — OFFICE VISIT (OUTPATIENT)
Dept: PSYCHIATRY | Facility: CLINIC | Age: 69
End: 2024-10-23
Payer: MEDICARE

## 2024-10-23 DIAGNOSIS — G89.4 CHRONIC PAIN SYNDROME: Chronic | ICD-10-CM

## 2024-10-23 DIAGNOSIS — F11.90 CHRONIC, CONTINUOUS USE OF OPIOIDS: ICD-10-CM

## 2024-10-23 DIAGNOSIS — F41.1 GENERALIZED ANXIETY DISORDER: ICD-10-CM

## 2024-10-23 PROCEDURE — 99999 PR PBB SHADOW E&M-EST. PATIENT-LVL I: CPT | Mod: PBBFAC,,, | Performed by: PSYCHIATRY & NEUROLOGY

## 2024-10-23 PROCEDURE — 99214 OFFICE O/P EST MOD 30 MIN: CPT | Mod: S$GLB,,, | Performed by: PSYCHIATRY & NEUROLOGY

## 2024-10-23 PROCEDURE — 3044F HG A1C LEVEL LT 7.0%: CPT | Mod: CPTII,S$GLB,, | Performed by: PSYCHIATRY & NEUROLOGY

## 2024-10-23 NOTE — PROGRESS NOTES
ESTABLISHED OUTPATIENT VISIT   E/M LEVEL 4: [id number]    ENCOUNTER DATE: 10/23/2024  SITE: [facility], [address]    HISTORY    CHIEF COMPLAINT   Tasha Hawley is a 69 y.o. female who presents for follow up of depression/anxiety    HPI     Last saw patient in December 2021.    Pt. Continues to contend with multiple physical issues.    Some depression.    Appears to be doing reasonably well psychiatrically.    Has dog, Deborah Q, a basilio retriever.    No EPS on exam today.    Psychiatric Review Of Systems - Is patient experiencing or having changes in:  sleep: pain can interfere  appetite: no  weight: no  energy/anergy: no  interest/pleasure/anhedonia: no  somatic symptoms: no  libido: no  anxiety/panic: no  guilty/hopelessness: no  concentration: no  S.I.B.s/risky behavior: no  Irritability: no  Racing thoughts: no  Impulsive behaviors: no  Paranoia:no  AVH:no    Recent alcohol: no  Recent drug: no    Medical ROS    General ROS: has suprapubic catheter, uses walker  ENT ROS: negative  Cardiovascular ROS: negative  Respiratory ROS: negative  Gastrointestinal ROS: negative  Neurological ROS: negative  Dermatological ROS: negative  Endocrine ROS: negative    PAST MEDICAL, FAMILY AND SOCIAL HISTORY: The patient's past medical, family and social history have been reviewed and updated as appropriate within the electronic medical record - see encounter notes.    PSYCHOTROPIC MEDICATIONS   Cymbalta 60 mg BID, Seroquel 200 mg at bedtime, Seroquel 25 mg bid prn[has been taking about ever 2 days], Amitriptyline 25 mg at bedtime, Trazodone 300 mg at bedtime    Scheduled and PRN Medications     Current Outpatient Medications:     albuterol (PROVENTIL/VENTOLIN HFA) 90 mcg/actuation inhaler, Inhale 2 puffs into the lungs every 6 (six) hours as needed for Shortness of Breath or Wheezing. Rescue, Disp: 8.5 g, Rfl: 5    amitriptyline (ELAVIL) 25 MG tablet, Take 1 tablet (25 mg total) by mouth every evening., Disp: 90  tablet, Rfl: 3    atorvastatin (LIPITOR) 80 MG tablet, TAKE ONE TABLET BY MOUTH ONCE DAILY, Disp: 90 tablet, Rfl: 3    butalbital-aspirin-caffeine -40 mg (FIORINAL) -40 mg Cap, Take 1 capsule by mouth every 4 (four) hours as needed., Disp: , Rfl:     calcium-vitamin D3 (OS-JACKIE 500 + D3) 500 mg-5 mcg (200 unit) per tablet, Take 2 tablets by mouth 2 (two) times daily., Disp: 120 tablet, Rfl: 11    cyanocobalamin, vitamin B-12, 5,000 mcg Subl, Place 1 tablet under the tongue once daily., Disp: , Rfl:     darifenacin (ENABLEX) 7.5 MG Tb24, Take 1 tablet (7.5 mg total) by mouth once daily., Disp: 30 tablet, Rfl: 11    docusate sodium (COLACE) 100 MG capsule, Take 200 mg by mouth every evening., Disp: , Rfl:     doxycycline (VIBRAMYCIN) 100 MG Cap, Take 100 mg by mouth every 12 (twelve) hours., Disp: , Rfl:     DULoxetine (CYMBALTA) 60 MG capsule, Take 1 capsule (60 mg total) by mouth 2 (two) times daily., Disp: 180 capsule, Rfl: 0    furosemide (LASIX) 40 MG tablet, Take 1 tablet (40 mg total) by mouth once daily., Disp: 90 tablet, Rfl: 1    HYDROcodone-acetaminophen (NORCO)  mg per tablet, Take 1 tablet by mouth 5 (five) times daily., Disp: , Rfl:     hydrocortisone (CORTEF) 10 MG Tab, Take 1 tablet (10 mg total) by mouth once daily., Disp: 90 tablet, Rfl: 3    hydrocortisone (CORTEF) 5 MG Tab, Take 1 tablet (5 mg total) by mouth before dinner., Disp: 90 tablet, Rfl: 3    intrathecal pain pump compound, Hydromorphone (7.5 mg/mL) infusion at 8.59 mg/day (0.3578 mg/hr) out of a total reservoir volume of 40 mL Pump filled monthly, Disp: , Rfl:     levothyroxine (SYNTHROID) 150 MCG tablet, Take 1 tablet (150 mcg total) by mouth before breakfast., Disp: 30 tablet, Rfl: 11    menthol (BIOFREEZE, MENTHOL,) 10 % Crea, Apply topically as needed., Disp: , Rfl:     neomycin-polymyxin-hydrocortisone (CORTISPORIN) 3.5-10,000-1 mg/mL-unit/mL-% otic suspension, Place 3 drops into both ears 4 (four) times daily.,  Disp: 10 mL, Rfl: 0    nitroGLYCERIN (NITROSTAT) 0.4 MG SL tablet, Place 0.4 mg under the tongue every 5 (five) minutes as needed for Chest pain., Disp: , Rfl:     pantoprazole (PROTONIX) 40 MG tablet, Take 1 tablet (40 mg total) by mouth once daily., Disp: 90 tablet, Rfl: 0    QUEtiapine (SEROQUEL) 100 MG Tab, Take 1 tablet (100 mg total) by mouth nightly., Disp: 90 tablet, Rfl: 3    QUEtiapine (SEROQUEL) 25 MG Tab, Take 1 tablet (25 mg total) by mouth once daily., Disp: 90 tablet, Rfl: 3    senna (SENNA LAX) 8.6 mg tablet, Take 2 tablets by mouth 2 (two) times daily., Disp: , Rfl:     tiotropium bromide (SPIRIVA RESPIMAT) 2.5 mcg/actuation inhaler, Inhale 2 puffs into the lungs once daily. Controller. Please restart taking., Disp: 4 g, Rfl: 1    tobramycin-dexAMETHasone 0.3-0.1% (TOBRADEX) 0.3-0.1 % DrpS, Place 1 drop into both eyes every 6 (six) hours while awake., Disp: 5 mL, Rfl: 0    traMADoL (ULTRAM) 50 mg tablet, Take 50 mg by mouth every 12 (twelve) hours as needed for Pain., Disp: , Rfl:     traZODone (DESYREL) 300 MG tablet, Take 300 mg by mouth every evening., Disp: , Rfl:     EXAMINATION  Wt Readings from Last 3 Encounters:   09/16/24 110.2 kg (242 lb 15.2 oz)   08/30/24 110.2 kg (243 lb)   08/26/24 108.9 kg (240 lb 1.3 oz)     Temp Readings from Last 3 Encounters:   09/16/24 97.8 °F (36.6 °C) (Oral)   08/30/24 97.7 °F (36.5 °C) (Oral)   08/26/24 97.8 °F (36.6 °C)     BP Readings from Last 3 Encounters:   10/15/24 110/68   09/16/24 127/60   08/30/24 (!) 109/59     Pulse Readings from Last 3 Encounters:   10/15/24 65   09/16/24 84   08/30/24 76       CONSTITUTIONAL  General Appearance: well nourished    MUSCULOSKELETAL  Muscle Strength and Tone: normal strength and tone  Abnormal Involuntary Movements: no abnormal movement noted  Gait and Station: normal gait    PSYCHIATRIC   Level of Consciousness: alert  Orientation: oriented to person, place and time  Grooming: well groomed  Psychomotor Behavior: no  restlessness/agitation  Speech: normal in rate, rhythm and volume  Language: normal vocabulary  Mood: anxious at times  Affect: full range and appropriate  Thought Process: logical and goal directed  Associations: intact associations  Thought Content: no SI/HI  Memory: grossly intact  Attention: intact to content of interview  Fund of Knowledge: appears adequate  Insight: good  Judgement: good    MEDICAL DECISION MAKING    DIAGNOSES  Major Depressive d/o    PROBLEM LIST AND MANAGEMENT PLANS  - discontinue Amitriptyline  - continue other above psychotropic meds  - rtc 1 month     Time with patient: 15 min  More than 50% of the time was spent counseling/coordinating care.  LABORATORY DATA    Lab Visit on 10/17/2024   Component Date Value Ref Range Status    Cholesterol 10/17/2024 158  120 - 199 mg/dL Final    Comment: The National Cholesterol Education Program (NCEP) has set the  following guidelines (reference ranges) for Cholesterol:  Optimal.....................<200 mg/dL  Borderline High.............200-239 mg/dL  High........................> or = 240 mg/dL      Triglycerides 10/17/2024 77  30 - 150 mg/dL Final    Comment: The National Cholesterol Education Program (NCEP) has set the  following guidelines (reference values) for triglycerides:  Normal......................<150 mg/dL  Borderline High.............150-199 mg/dL  High........................200-499 mg/dL      HDL 10/17/2024 54  40 - 75 mg/dL Final    Comment: The National Cholesterol Education Program (NCEP) has set the  following guidelines (reference values) for HDL Cholesterol:  Low...............<40 mg/dL  Optimal...........>60 mg/dL      LDL Cholesterol 10/17/2024 88.6  63.0 - 159.0 mg/dL Final    Comment: The National Cholesterol Education Program (NCEP) has set the  following guidelines (reference values) for LDL Cholesterol:  Optimal.......................<130 mg/dL  Borderline High...............130-159  mg/dL  High..........................160-189 mg/dL  Very High.....................>190 mg/dL      HDL/Cholesterol Ratio 10/17/2024 34.2  20.0 - 50.0 % Final    Total Cholesterol/HDL Ratio 10/17/2024 2.9  2.0 - 5.0 Final    Non-HDL Cholesterol 10/17/2024 104  mg/dL Final    Comment: Risk category and Non-HDL cholesterol goals:  Coronary heart disease (CHD)or equivalent (10-year risk of CHD >20%):  Non-HDL cholesterol goal     <130 mg/dL  Two or more CHD risk factors and 10-year risk of CHD <= 20%:  Non-HDL cholesterol goal     <160 mg/dL  0 to 1 CHD risk factor:  Non-HDL cholesterol goal     <190 mg/dL      Sodium 10/17/2024 142  136 - 145 mmol/L Final    Potassium 10/17/2024 4.1  3.5 - 5.1 mmol/L Final    Chloride 10/17/2024 106  95 - 110 mmol/L Final    CO2 10/17/2024 27  23 - 29 mmol/L Final    Glucose 10/17/2024 99  70 - 110 mg/dL Final    BUN 10/17/2024 11  8 - 23 mg/dL Final    Creatinine 10/17/2024 0.8  0.5 - 1.4 mg/dL Final    Calcium 10/17/2024 9.2  8.7 - 10.5 mg/dL Final    Total Protein 10/17/2024 6.9  6.0 - 8.4 g/dL Final    Albumin 10/17/2024 3.3 (L)  3.5 - 5.2 g/dL Final    Total Bilirubin 10/17/2024 0.6  0.1 - 1.0 mg/dL Final    Comment: For infants and newborns, interpretation of results should be based  on gestational age, weight and in agreement with clinical  observations.    Premature Infant recommended reference ranges:  Up to 24 hours.............<8.0 mg/dL  Up to 48 hours............<12.0 mg/dL  3-5 days..................<15.0 mg/dL  6-29 days.................<15.0 mg/dL      Alkaline Phosphatase 10/17/2024 127  40 - 150 U/L Final    AST 10/17/2024 10  10 - 40 U/L Final    ALT 10/17/2024 10  10 - 44 U/L Final    eGFR 10/17/2024 >60.0  >60 mL/min/1.73 m^2 Final    Anion Gap 10/17/2024 9  8 - 16 mmol/L Final   Lab Visit on 09/10/2024   Component Date Value Ref Range Status    Specimen UA 09/10/2024 Urine, Supra Pubic   Final    Color, UA 09/10/2024 Yellow  Yellow, Straw, [place]ola Final     Appearance, UA 09/10/2024 Hazy (A)  Clear Final    pH, UA 09/10/2024 6.0  5.0 - 8.0 Final    Specific Gravity, UA 09/10/2024 1.015  1.005 - 1.030 Final    Protein, UA 09/10/2024 1+ (A)  Negative Final    Comment: Recommend a 24 hour urine protein or a urine   protein/creatinine ratio if globulin induced proteinuria is  clinically suspected.      Glucose, UA 09/10/2024 Negative  Negative Final    Ketones, UA 09/10/2024 Negative  Negative Final    Bilirubin (UA) 09/10/2024 Negative  Negative Final    Occult Blood UA 09/10/2024 2+ (A)  Negative Final    Nitrite, UA 09/10/2024 Negative  Negative Final    Leukocytes, UA 09/10/2024 3+ (A)  Negative Final    RBC, UA 09/10/2024 55 (H)  0 - 4 /hpf Final    WBC, UA 09/10/2024 >100 (H)  0 - 5 /hpf Final    WBC Clumps, UA 09/10/2024 Few (A)  None-Rare Final    Bacteria 09/10/2024 Few (A)  None-Occ /hpf Final    Squam Epithel, UA 09/10/2024 0  /hpf Final    Hyaline Casts, UA 09/10/2024 20 (A)  0-1/lpf /lpf Final    Microscopic Comment 09/10/2024 SEE COMMENT   Final    Comment: Other formed elements not mentioned in the report are not   present in the microscopic examination.       Urine Culture, Routine 09/10/2024  (A)   Final                    Value:ESCHERICHIA COLI  > 100,000 cfu/ml  No other significant isolate     Admission on 08/30/2024, Discharged on 08/31/2024   Component Date Value Ref Range Status    WBC 08/30/2024 6.88  3.90 - 12.70 K/uL Final    RBC 08/30/2024 3.79 (L)  4.00 - 5.40 M/uL Final    Hemoglobin 08/30/2024 10.4 (L)  12.0 - 16.0 g/dL Final    Hematocrit 08/30/2024 34.1 (L)  37.0 - 48.5 % Final    MCV 08/30/2024 90  82 - 98 fL Final    MCH 08/30/2024 27.4  27.0 - 31.0 pg Final    MCHC 08/30/2024 30.5 (L)  32.0 - 36.0 g/dL Final    RDW 08/30/2024 14.9 (H)  11.5 - 14.5 % Final    Platelets 08/30/2024 217  150 - 450 K/uL Final    MPV 08/30/2024 9.4  9.2 - 12.9 fL Final    Immature Granulocytes 08/30/2024 0.1  0.0 - 0.5 % Final    Gran # (ANC) 08/30/2024 4.2   1.8 - 7.7 K/uL Final    Immature Grans (Abs) 08/30/2024 0.01  0.00 - 0.04 K/uL Final    Comment: Mild elevation in immature granulocytes is non specific and   can be seen in a variety of conditions including stress response,   acute inflammation, trauma and pregnancy. Correlation with other   laboratory and clinical findings is essential.      Lymph # 08/30/2024 1.4  1.0 - 4.8 K/uL Final    Mono # 08/30/2024 0.7  0.3 - 1.0 K/uL Final    Eos # 08/30/2024 0.5  0.0 - 0.5 K/uL Final    Baso # 08/30/2024 0.02  0.00 - 0.20 K/uL Final    nRBC 08/30/2024 0  0 /100 WBC Final    Gran % 08/30/2024 61.5  38.0 - 73.0 % Final    Lymph % 08/30/2024 20.5  18.0 - 48.0 % Final    Mono % 08/30/2024 10.0  4.0 - 15.0 % Final    Eosinophil % 08/30/2024 7.6  0.0 - 8.0 % Final    Basophil % 08/30/2024 0.3  0.0 - 1.9 % Final    Differential Method 08/30/2024 Automated   Final    Sodium 08/30/2024 142  136 - 145 mmol/L Final    Potassium 08/30/2024 4.0  3.5 - 5.1 mmol/L Final    Chloride 08/30/2024 103  95 - 110 mmol/L Final    CO2 08/30/2024 29  23 - 29 mmol/L Final    Glucose 08/30/2024 112 (H)  70 - 110 mg/dL Final    BUN 08/30/2024 10  8 - 23 mg/dL Final    Creatinine 08/30/2024 0.9  0.5 - 1.4 mg/dL Final    Calcium 08/30/2024 9.0  8.7 - 10.5 mg/dL Final    Total Protein 08/30/2024 6.9  6.0 - 8.4 g/dL Final    Albumin 08/30/2024 3.1 (L)  3.5 - 5.2 g/dL Final    Total Bilirubin 08/30/2024 0.3  0.1 - 1.0 mg/dL Final    Comment: For infants and newborns, interpretation of results should be based  on gestational age, weight and in agreement with clinical  observations.    Premature Infant recommended reference ranges:  Up to 24 hours.............<8.0 mg/dL  Up to 48 hours............<12.0 mg/dL  3-5 days..................<15.0 mg/dL  6-29 days.................<15.0 mg/dL      Alkaline Phosphatase 08/30/2024 133  55 - 135 U/L Final    AST 08/30/2024 13  10 - 40 U/L Final    Specimen slightly hemolyzed    ALT 08/30/2024 6 (L)  10 - 44 U/L  Final    eGFR 08/30/2024 >60  >60 mL/min/1.73 m^2 Final    Anion Gap 08/30/2024 10  8 - 16 mmol/L Final    Troponin I 08/30/2024 <0.006  0.000 - 0.026 ng/mL Final    Comment: The reference interval for Troponin I represents the 99th percentile   cutoff   for our facility and is consistent with 3rd generation assay   performance.      BNP 08/30/2024 <10  0 - 99 pg/mL Final    Values of less than 100 pg/ml are consistent with non-CHF populations.    QRS Duration 08/30/2024 100  ms Final    [facility] QTC Calculation 08/30/2024 451  ms Final   Admission on 08/12/2024, Discharged on 08/12/2024   Component Date Value Ref Range Status    Final Pathologic Diagnosis 08/12/2024    Final                    Value:1. Esophagus, distal, biopsy:Esophageal squamous mucosa with reactive changes.    2. Esophagus, proximal, biopsy:Esophageal squamous mucosa with no diagnostic abnormality.      Interp By Gurinder Franks M.D., Signed on 08/19/2024 at 12:28    Gross 08/12/2024    Final                    Value:Pathology ID:  [id number]  Patient ID:  [id number]  Received in 2 parts  Part 1:  Pathology ID:  [id number]  Patient ID:  [id number]  The specimen is received in formalin labeled &quot;distal esophagus&quot;.  The specimen consists of 3 tan fragments of soft tissue measuring 0.9 x 0.2 x 0.1 cm in aggregate.  The specimen is submitted entirely in cassette [id number].    Part 2:  Pathology ID:  [id number]  Patient ID:  [id number]  The specimen is received in formalin labeled &quot;proximal esophagus&quot;.  The specimen consists of 2 tan fragments of soft tissue measuring 0.5 x 0.2 x 0.1 cm in aggregate.  The specimen is submitted entirely in cassette [id number].  Jag Vazquez      Disclaimer 08/12/2024 Unless the case is a 'gross only' or additional testing only, the final diagnosis for each specimen is based on a microscopic examination of appropriate tissue sections.   Final   Office Visit on 07/28/2024   Component Date Value Ref Range Status     Aerobic Bacterial Culture 07/28/2024  (A)   Final                    Value:PSEUDOMONAS AERUGINOSA  Moderate  Skin french also present             Juan A Madera

## 2024-10-23 NOTE — TELEPHONE ENCOUNTER
----- Message from Emilie sent at 10/23/2024  3:30 PM CDT -----  Type:  Patient Returning Call    Who Called:Pt   Would the patient rather a call back or a response via [facility]? Call back   Best Call Back Number:[phone]  Additional Information: who has an appt with Dr Armstrong in Nov... 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 want to know if she should bring them in now are hold for her appt

## 2024-10-23 NOTE — TELEPHONE ENCOUNTER
----- Message from Gabrielle sent at 10/23/2024 11:20 AM CDT -----  Type:  Needs Medical Advice    Who Called: pt  Would the patient rather a call back or a response via [facility]? call  Best Call Back Number:  [phone]  Additional Information: pt would like to a good time drop her films and records off to office and would like to speak with nurse

## 2024-10-23 NOTE — TELEPHONE ENCOUNTER
Spoke to patient and notified her that she has not seen Dr. Armstrong yet so he cannot evaluate her until she is established with him, also told her that he does not have any sooner appointments than her 11/29 appointment, instructed her to go to ER or urgent care to be evaluated for her symptoms or she can look around for a urologist who can see her sooner

## 2024-10-23 NOTE — TELEPHONE ENCOUNTER
Pt was informed to bring her records with her to her doctor appt in Nov. She was also advised that she will have to wait to see Dr Armstrong in Nov. Again.

## 2024-10-24 ENCOUNTER — TELEPHONE (OUTPATIENT)
Dept: INTERNAL MEDICINE | Facility: CLINIC | Age: 69
End: 2024-10-24
Payer: MEDICARE

## 2024-10-24 NOTE — TELEPHONE ENCOUNTER
Spoke with Ms. Gilman, verbal order given. She accepted and verbalized understanding.    Josh TREVIZO

## 2024-10-24 NOTE — TELEPHONE ENCOUNTER
Please call her home health agency.  Give a verbal order to add physical therapy eval and treat.  Thank you very much

## 2024-10-24 NOTE — TELEPHONE ENCOUNTER
----- Message from Violeta sent at 10/24/2024  9:36 AM CDT -----  Contact: [facility]  [phone]  Type: Home Health (orders, updates, clarifications, etc.)    Home Health Agency/Nurse: [facility]    Phone number:  ext [phone]    Reason for call: orders for HH and PT for eval and treat    Comments:Fax # [phone]

## 2024-10-25 ENCOUNTER — TELEPHONE (OUTPATIENT)
Dept: UROLOGY | Facility: CLINIC | Age: 69
End: 2024-10-25
Payer: MEDICARE

## 2024-10-26 NOTE — TELEPHONE ENCOUNTER
----- Message from Nurse Barraza sent at 10/22/2024  3:46 PM CDT -----  Regarding: FW: Patient Callback    ----- Message -----  From: Tracey Bryson  Sent: 10/22/2024   2:36 PM CDT  To: [facility] Staff  Subject: Patient Callback                                 Name of Who is Calling:   JUDIE YO [[id number]]     What is the request in detail:   Patient needs BOTOX would like to speak to her nurse     Can the clinic reply by [facility]:   No     What Number to Call Back if not in [facility]:  Telephone Information:  Mobile          [phone]  Mobile          Not on file.

## 2024-10-26 NOTE — TELEPHONE ENCOUNTER
Spoke to our manager, Keenan at [facility].  She had a conversation with Mrs. Hawley 2 days ago.  Mrs. Hawley is aware that I am no longer willing to see her nor will she be seen by anyone at [facility] because of her consistent no shows, last minute cancellations.

## 2024-10-29 ENCOUNTER — TELEPHONE (OUTPATIENT)
Dept: WOUND CARE | Facility: HOSPITAL | Age: 69
End: 2024-10-29

## 2024-10-31 ENCOUNTER — TELEPHONE (OUTPATIENT)
Dept: UROLOGY | Facility: CLINIC | Age: 69
End: 2024-10-31
Payer: MEDICARE

## 2024-11-09 ENCOUNTER — HOSPITAL ENCOUNTER (INPATIENT)
Facility: HOSPITAL | Age: 69
LOS: 5 days | Discharge: HOME HEALTH | DRG: 603 | End: 2024-11-14
Attending: EMERGENCY MEDICINE | Admitting: INTERNAL MEDICINE
Payer: MEDICARE

## 2024-11-09 DIAGNOSIS — L03.116 CELLULITIS OF LEFT LOWER EXTREMITY: Primary | ICD-10-CM

## 2024-11-09 DIAGNOSIS — R07.9 CHEST PAIN: ICD-10-CM

## 2024-11-09 DIAGNOSIS — N39.0 URINARY TRACT INFECTION ASSOCIATED WITH INDWELLING URETHRAL CATHETER, INITIAL ENCOUNTER: ICD-10-CM

## 2024-11-09 DIAGNOSIS — T83.511A URINARY TRACT INFECTION ASSOCIATED WITH INDWELLING URETHRAL CATHETER, INITIAL ENCOUNTER: ICD-10-CM

## 2024-11-09 DIAGNOSIS — I89.0 LYMPHEDEMA OF BOTH LOWER EXTREMITIES: ICD-10-CM

## 2024-11-09 LAB
ALBUMIN SERPL BCP-MCNC: 3.6 G/DL (ref 3.5–5.2)
ALP SERPL-CCNC: 129 U/L (ref 40–150)
ALT SERPL W/O P-5'-P-CCNC: 8 U/L (ref 10–44)
ANION GAP SERPL CALC-SCNC: 11 MMOL/L (ref 8–16)
AST SERPL-CCNC: 10 U/L (ref 10–40)
BACTERIA #/AREA URNS HPF: ABNORMAL /HPF
BASOPHILS # BLD AUTO: 0.02 K/UL (ref 0–0.2)
BASOPHILS NFR BLD: 0.3 % (ref 0–1.9)
BILIRUB SERPL-MCNC: 0.6 MG/DL (ref 0.1–1)
BILIRUB UR QL STRIP: NEGATIVE
BUN SERPL-MCNC: 12 MG/DL (ref 8–23)
CALCIUM SERPL-MCNC: 9.7 MG/DL (ref 8.7–10.5)
CHLORIDE SERPL-SCNC: 104 MMOL/L (ref 95–110)
CLARITY UR: ABNORMAL
CO2 SERPL-SCNC: 26 MMOL/L (ref 23–29)
COLOR UR: YELLOW
CREAT SERPL-MCNC: 1 MG/DL (ref 0.5–1.4)
DIFFERENTIAL METHOD BLD: ABNORMAL
EOSINOPHIL # BLD AUTO: 0.2 K/UL (ref 0–0.5)
EOSINOPHIL NFR BLD: 3 % (ref 0–8)
ERYTHROCYTE [DISTWIDTH] IN BLOOD BY AUTOMATED COUNT: 15.3 % (ref 11.5–14.5)
EST. GFR  (NO RACE VARIABLE): >60 ML/MIN/1.73 M^2
GLUCOSE SERPL-MCNC: 98 MG/DL (ref 70–110)
GLUCOSE UR QL STRIP: NEGATIVE
HCT VFR BLD AUTO: 36.2 % (ref 37–48.5)
HGB BLD-MCNC: 11.4 G/DL (ref 12–16)
HGB UR QL STRIP: ABNORMAL
HYALINE CASTS #/AREA URNS LPF: 0 /LPF
IMM GRANULOCYTES # BLD AUTO: 0.02 K/UL (ref 0–0.04)
IMM GRANULOCYTES NFR BLD AUTO: 0.3 % (ref 0–0.5)
KETONES UR QL STRIP: NEGATIVE
LACTATE SERPL-SCNC: 1.1 MMOL/L (ref 0.5–2.2)
LEUKOCYTE ESTERASE UR QL STRIP: ABNORMAL
LYMPHOCYTES # BLD AUTO: 1.3 K/UL (ref 1–4.8)
LYMPHOCYTES NFR BLD: 18.6 % (ref 18–48)
MCH RBC QN AUTO: 27.1 PG (ref 27–31)
MCHC RBC AUTO-ENTMCNC: 31.5 G/DL (ref 32–36)
MCV RBC AUTO: 86 FL (ref 82–98)
MICROSCOPIC COMMENT: ABNORMAL
MONOCYTES # BLD AUTO: 0.5 K/UL (ref 0.3–1)
MONOCYTES NFR BLD: 6.7 % (ref 4–15)
NEUTROPHILS # BLD AUTO: 5 K/UL (ref 1.8–7.7)
NEUTROPHILS NFR BLD: 71.1 % (ref 38–73)
NITRITE UR QL STRIP: POSITIVE
NRBC BLD-RTO: 0 /100 WBC
PH UR STRIP: 7 [PH] (ref 5–8)
PLATELET # BLD AUTO: 262 K/UL (ref 150–450)
PMV BLD AUTO: 10.2 FL (ref 9.2–12.9)
POTASSIUM SERPL-SCNC: 3.5 MMOL/L (ref 3.5–5.1)
PROT SERPL-MCNC: 7.6 G/DL (ref 6–8.4)
PROT UR QL STRIP: ABNORMAL
RBC # BLD AUTO: 4.21 M/UL (ref 4–5.4)
RBC #/AREA URNS HPF: 80 /HPF (ref 0–4)
SODIUM SERPL-SCNC: 141 MMOL/L (ref 136–145)
SP GR UR STRIP: 1.01 (ref 1–1.03)
URN SPEC COLLECT METH UR: ABNORMAL
UROBILINOGEN UR STRIP-ACNC: NEGATIVE EU/DL
WBC # BLD AUTO: 6.99 K/UL (ref 3.9–12.7)
WBC #/AREA URNS HPF: >100 /HPF (ref 0–5)
WBC CLUMPS URNS QL MICRO: ABNORMAL

## 2024-11-09 PROCEDURE — 25000003 PHARM REV CODE 250: Performed by: EMERGENCY MEDICINE

## 2024-11-09 PROCEDURE — 83605 ASSAY OF LACTIC ACID: CPT | Performed by: EMERGENCY MEDICINE

## 2024-11-09 PROCEDURE — 81000 URINALYSIS NONAUTO W/SCOPE: CPT | Performed by: EMERGENCY MEDICINE

## 2024-11-09 PROCEDURE — 87040 BLOOD CULTURE FOR BACTERIA: CPT | Performed by: EMERGENCY MEDICINE

## 2024-11-09 PROCEDURE — 80053 COMPREHEN METABOLIC PANEL: CPT | Performed by: EMERGENCY MEDICINE

## 2024-11-09 PROCEDURE — 99285 EMERGENCY DEPT VISIT HI MDM: CPT

## 2024-11-09 PROCEDURE — 63600175 PHARM REV CODE 636 W HCPCS: Performed by: EMERGENCY MEDICINE

## 2024-11-09 PROCEDURE — 96374 THER/PROPH/DIAG INJ IV PUSH: CPT

## 2024-11-09 PROCEDURE — 12000002 HC ACUTE/MED SURGE SEMI-PRIVATE ROOM

## 2024-11-09 PROCEDURE — 85025 COMPLETE CBC W/AUTO DIFF WBC: CPT | Performed by: EMERGENCY MEDICINE

## 2024-11-09 RX ORDER — ACETAMINOPHEN 500 MG
1000 TABLET ORAL
Status: COMPLETED | OUTPATIENT
Start: 2024-11-09 | End: 2024-11-09

## 2024-11-09 RX ORDER — CEFTRIAXONE 1 G/1
1 INJECTION, POWDER, FOR SOLUTION INTRAMUSCULAR; INTRAVENOUS
Status: DISCONTINUED | OUTPATIENT
Start: 2024-11-10 | End: 2024-11-14

## 2024-11-09 RX ORDER — ENOXAPARIN SODIUM 100 MG/ML
40 INJECTION SUBCUTANEOUS EVERY 24 HOURS
Status: DISCONTINUED | OUTPATIENT
Start: 2024-11-10 | End: 2024-11-14 | Stop reason: HOSPADM

## 2024-11-09 RX ORDER — IBUPROFEN 200 MG
16 TABLET ORAL
Status: DISCONTINUED | OUTPATIENT
Start: 2024-11-10 | End: 2024-11-14 | Stop reason: HOSPADM

## 2024-11-09 RX ORDER — CEFTRIAXONE 1 G/1
1 INJECTION, POWDER, FOR SOLUTION INTRAMUSCULAR; INTRAVENOUS
Status: COMPLETED | OUTPATIENT
Start: 2024-11-09 | End: 2024-11-09

## 2024-11-09 RX ORDER — NALOXONE HCL 0.4 MG/ML
0.02 VIAL (ML) INJECTION
Status: DISCONTINUED | OUTPATIENT
Start: 2024-11-10 | End: 2024-11-14 | Stop reason: HOSPADM

## 2024-11-09 RX ORDER — SODIUM CHLORIDE 0.9 % (FLUSH) 0.9 %
10 SYRINGE (ML) INJECTION EVERY 12 HOURS PRN
Status: DISCONTINUED | OUTPATIENT
Start: 2024-11-10 | End: 2024-11-14 | Stop reason: HOSPADM

## 2024-11-09 RX ORDER — MUPIROCIN 20 MG/G
OINTMENT TOPICAL 2 TIMES DAILY
Status: DISCONTINUED | OUTPATIENT
Start: 2024-11-10 | End: 2024-11-14 | Stop reason: HOSPADM

## 2024-11-09 RX ORDER — ONDANSETRON HYDROCHLORIDE 2 MG/ML
4 INJECTION, SOLUTION INTRAVENOUS EVERY 8 HOURS PRN
Status: DISCONTINUED | OUTPATIENT
Start: 2024-11-10 | End: 2024-11-14 | Stop reason: HOSPADM

## 2024-11-09 RX ORDER — DOXYCYCLINE HYCLATE 100 MG
100 TABLET ORAL EVERY 12 HOURS
Status: COMPLETED | OUTPATIENT
Start: 2024-11-09 | End: 2024-11-10

## 2024-11-09 RX ORDER — IBUPROFEN 200 MG
24 TABLET ORAL
Status: DISCONTINUED | OUTPATIENT
Start: 2024-11-10 | End: 2024-11-14 | Stop reason: HOSPADM

## 2024-11-09 RX ORDER — GLUCAGON 1 MG
1 KIT INJECTION
Status: DISCONTINUED | OUTPATIENT
Start: 2024-11-10 | End: 2024-11-14 | Stop reason: HOSPADM

## 2024-11-09 RX ADMIN — ACETAMINOPHEN 1000 MG: 500 TABLET ORAL at 11:11

## 2024-11-09 RX ADMIN — CEFTRIAXONE SODIUM 1 G: 1 INJECTION, POWDER, FOR SOLUTION INTRAMUSCULAR; INTRAVENOUS at 11:11

## 2024-11-10 PROCEDURE — 11000001 HC ACUTE MED/SURG PRIVATE ROOM

## 2024-11-10 PROCEDURE — 25000003 PHARM REV CODE 250: Performed by: INTERNAL MEDICINE

## 2024-11-10 PROCEDURE — 63600175 PHARM REV CODE 636 W HCPCS: Performed by: INTERNAL MEDICINE

## 2024-11-10 PROCEDURE — 25000003 PHARM REV CODE 250: Performed by: STUDENT IN AN ORGANIZED HEALTH CARE EDUCATION/TRAINING PROGRAM

## 2024-11-10 PROCEDURE — 25000003 PHARM REV CODE 250: Performed by: EMERGENCY MEDICINE

## 2024-11-10 RX ORDER — TRAMADOL HYDROCHLORIDE 50 MG/1
50 TABLET ORAL EVERY 12 HOURS PRN
Status: DISCONTINUED | OUTPATIENT
Start: 2024-11-10 | End: 2024-11-10

## 2024-11-10 RX ORDER — HYDROMORPHONE HYDROCHLORIDE 2 MG/1
2 TABLET ORAL EVERY 6 HOURS PRN
Status: DISCONTINUED | OUTPATIENT
Start: 2024-11-10 | End: 2024-11-14 | Stop reason: HOSPADM

## 2024-11-10 RX ORDER — HYDROCODONE BITARTRATE AND ACETAMINOPHEN 10; 325 MG/1; MG/1
1 TABLET ORAL EVERY 8 HOURS PRN
Status: DISCONTINUED | OUTPATIENT
Start: 2024-11-10 | End: 2024-11-14 | Stop reason: HOSPADM

## 2024-11-10 RX ORDER — AMITRIPTYLINE HYDROCHLORIDE 25 MG/1
25 TABLET, FILM COATED ORAL NIGHTLY
Status: DISCONTINUED | OUTPATIENT
Start: 2024-11-10 | End: 2024-11-14 | Stop reason: HOSPADM

## 2024-11-10 RX ORDER — QUETIAPINE FUMARATE 100 MG/1
100 TABLET, FILM COATED ORAL NIGHTLY
Status: DISCONTINUED | OUTPATIENT
Start: 2024-11-10 | End: 2024-11-14 | Stop reason: HOSPADM

## 2024-11-10 RX ORDER — TRAZODONE HYDROCHLORIDE 100 MG/1
100 TABLET ORAL NIGHTLY
Status: DISCONTINUED | OUTPATIENT
Start: 2024-11-10 | End: 2024-11-14 | Stop reason: HOSPADM

## 2024-11-10 RX ORDER — QUETIAPINE FUMARATE 25 MG/1
25 TABLET, FILM COATED ORAL DAILY
Status: DISCONTINUED | OUTPATIENT
Start: 2024-11-10 | End: 2024-11-14 | Stop reason: HOSPADM

## 2024-11-10 RX ORDER — PANTOPRAZOLE SODIUM 40 MG/1
40 TABLET, DELAYED RELEASE ORAL DAILY
Status: DISCONTINUED | OUTPATIENT
Start: 2024-11-10 | End: 2024-11-14 | Stop reason: HOSPADM

## 2024-11-10 RX ADMIN — TRAZODONE HYDROCHLORIDE 100 MG: 100 TABLET ORAL at 03:11

## 2024-11-10 RX ADMIN — QUETIAPINE FUMARATE 100 MG: 100 TABLET ORAL at 02:11

## 2024-11-10 RX ADMIN — DOXYCYCLINE HYCLATE 100 MG: 100 TABLET, COATED ORAL at 08:11

## 2024-11-10 RX ADMIN — ENOXAPARIN SODIUM 40 MG: 40 INJECTION SUBCUTANEOUS at 05:11

## 2024-11-10 RX ADMIN — AMITRIPTYLINE HYDROCHLORIDE 25 MG: 25 TABLET, FILM COATED ORAL at 02:11

## 2024-11-10 RX ADMIN — LEVOTHYROXINE SODIUM 150 MCG: 25 TABLET ORAL at 06:11

## 2024-11-10 RX ADMIN — PANTOPRAZOLE SODIUM 40 MG: 40 TABLET, DELAYED RELEASE ORAL at 09:11

## 2024-11-10 RX ADMIN — DOXYCYCLINE HYCLATE 100 MG: 100 TABLET, COATED ORAL at 12:11

## 2024-11-10 RX ADMIN — TRAMADOL HYDROCHLORIDE 50 MG: 50 TABLET, COATED ORAL at 06:11

## 2024-11-10 RX ADMIN — HYDROMORPHONE HYDROCHLORIDE 2 MG: 2 TABLET ORAL at 08:11

## 2024-11-10 RX ADMIN — HYDROMORPHONE HYDROCHLORIDE 2 MG: 2 TABLET ORAL at 02:11

## 2024-11-10 RX ADMIN — HYDROCODONE BITARTRATE AND ACETAMINOPHEN 1 TABLET: 10; 325 TABLET ORAL at 10:11

## 2024-11-10 RX ADMIN — CEFTRIAXONE SODIUM 1 G: 1 INJECTION, POWDER, FOR SOLUTION INTRAMUSCULAR; INTRAVENOUS at 11:11

## 2024-11-10 RX ADMIN — TRAZODONE HYDROCHLORIDE 100 MG: 100 TABLET ORAL at 08:11

## 2024-11-10 RX ADMIN — ONDANSETRON 4 MG: 2 INJECTION INTRAMUSCULAR; INTRAVENOUS at 06:11

## 2024-11-10 RX ADMIN — HYDROCODONE BITARTRATE AND ACETAMINOPHEN 1 TABLET: 10; 325 TABLET ORAL at 02:11

## 2024-11-10 RX ADMIN — ONDANSETRON 4 MG: 2 INJECTION INTRAMUSCULAR; INTRAVENOUS at 10:11

## 2024-11-10 RX ADMIN — DOXYCYCLINE HYCLATE 100 MG: 100 TABLET, COATED ORAL at 09:11

## 2024-11-10 RX ADMIN — AMITRIPTYLINE HYDROCHLORIDE 25 MG: 25 TABLET, FILM COATED ORAL at 08:11

## 2024-11-10 RX ADMIN — QUETIAPINE FUMARATE 25 MG: 25 TABLET ORAL at 09:11

## 2024-11-10 RX ADMIN — MUPIROCIN: 20 OINTMENT TOPICAL at 08:11

## 2024-11-10 RX ADMIN — MUPIROCIN: 20 OINTMENT TOPICAL at 09:11

## 2024-11-10 RX ADMIN — HYDROCODONE BITARTRATE AND ACETAMINOPHEN 1 TABLET: 10; 325 TABLET ORAL at 06:11

## 2024-11-10 RX ADMIN — QUETIAPINE FUMARATE 100 MG: 100 TABLET ORAL at 08:11

## 2024-11-10 NOTE — PLAN OF CARE
Problem: Adult Inpatient Plan of Care  Goal: Plan of Care Review  Outcome: Progressing     VIRTUAL NURSE:  Called patient's room via telephone; verified spelling of patient's name and birthdate.  Introduced as VN for night shift that will be working with floor nurse and nursing assistant.  Educated patient on VN's role in patient care and VIP model.  Plan of care reviewed with patient.  Education per flowsheet.   Informed patient that staff will round on them every 2 hours but to use call light for any other needs they may have; informed of fall risk and fall precautions.  Patient verbalized understanding.  Call light within reach per patient.  Opportunity given for questions and questions answered.  Admission assessment questions answered.  Instructed to call for assistance.  Will cont to monitor and intervene as needed.     Labs, notes, orders, and careplan initiated.        11/10/24 0121   Patient Request   Patient Requested lights off; unable to view patient and patient unable to hear d/t not having hearing aids with her. completed admit assessment questions over room phone. c/o left leg painand chronic back pain.   Nurse Notification   Bedside Nurse Notified? Yes   Name of Bedside Nurse REJI Oconnell   Nurse Notfication Method Secure Chat   Nurse Notified Of Patient Request  (pain med)   Admission   Initial VN Admission Questions Complete   Communication Issues? Technical Issue   Shift   Virtual Nurse - Rounding Complete   Pain Management Interventions pain management plan reviewed with patient/caregiver   Virtual Nurse - Patient Verbalized Approval Of Camera Use;VN Rounding   Type of Frequent Check   Type Patient Rounds   Safety/Activity   Safety Promotion/Fall Prevention Fall Risk reviewed with patient/family;high risk medications identified;instructed to call staff for mobility;medications reviewed;nonskid shoes/socks when out of bed;patient expresses understanding of fall risk and prevention;room near unit  station;supervised activity   Pain/Comfort/Sleep   Comfort/Acceptable Pain Level 7   Pain Body Location - Side Left   Pain Body Location - Orientation lower   Pain Body Location extremity  (chronic lower back pain)   Pain Rating (0-10): Rest 9   Sleep/Rest/Relaxation no problem identified;awake

## 2024-11-10 NOTE — PROGRESS NOTES
I saw and examined the patient today  She is complaining of pain in her legs  VSS stable  Labs reviewed   Continue current management

## 2024-11-10 NOTE — HPI
Very pleasant 68 y/o F , obesity , chronic pain syndrome s/p dilaudid pain pump, CAD, HTN, presents with chills, subjective fevers , LLE swelling redness, tender, non purulent , 8/10 actue pain over past 2 days   Dysuria , sx of UTI   No sick contacts or travel

## 2024-11-10 NOTE — H&P
[facility]  History & Physical    Patient Name: Tasha Hawley  MRN: [id number]  Patient Class: IP- Inpatient  Admission Date: 11/9/2024  Attending Physician: Damien Kearney MD   Primary Care Provider: Mesfin Hodges II, MD         Patient information was obtained from patient and ER records.     Subjective:     Principal Problem:Cellulitis of left lower extremity    Chief Complaint:   Chief Complaint   Patient presents with    Urinary Retention    Abdominal Pain     Patient reports to ER complaining of urinary retention and lower ABD pain. Patient states this has been getting progressively worse since last week. Patient has a catheter.         HPI:   Very pleasant 68 y/o F , obesity , chronic pain syndrome s/p dilaudid pain pump, CAD, HTN, presents with chills, subjective fevers , LLE swelling redness, tender, non purulent , 8/10 actue pain over past 2 days   Dysuria , sx of UTI   No sick contacts or travel     No new subjective & objective note has been filed under this hospital service since the last note was generated.    Assessment/Plan:     * Cellulitis of left lower extremity    IP admit , empiric IV antibiotics   AM labs , follow up cultures. Pain control   Wrap legs, wound consult     Insomnia due to medical condition  Trazadone       Lymphedema of both lower extremities  AC Wraps       Chronic pain syndrome  Dilaudid pain pump        VTE Risk Mitigation (From admission, onward)           Ordered     enoxaparin injection 40 mg  Daily         11/09/24 2321     IP VTE HIGH RISK PATIENT  Once         11/09/24 2321     Place sequential compression device  Until discontinued         11/09/24 2321                                    Damien Kearney MD  Department of Hospital Medicine  [facility]

## 2024-11-10 NOTE — ASSESSMENT & PLAN NOTE
IP admit , empiric IV antibiotics   AM labs , follow up cultures. Pain control   Wrap legs, wound consult

## 2024-11-10 NOTE — SUBJECTIVE & OBJECTIVE
Past Medical History:   Diagnosis Date    Anxiety     Arthritis     Bilateral lower extremity edema     severe chronic    Carotid artery occlusion     Cataract     CHF (congestive heart failure)     Coronary artery disease     subtotalled LAD with collateral    Depression     Fever blister     Hard of hearing     Hypokalemia 01/09/2023    Hyponatremia 02/04/2022    Hypothyroid     Iron deficiency anemia     Lumbar radiculopathy     with chronic pain    Ocular migraine     Other emphysema 05/22/2023    Renal disorder     Sleep apnea     cpap       Past Surgical History:   Procedure Laterality Date    ADENOIDECTOMY      BACK SURGERY      x 12    CARDIAC CATHETERIZATION  2016    subtotalled LAD with right to left collaterals    CATARACT EXTRACTION W/  INTRAOCULAR LENS IMPLANT Left     Dr Coleman     CYSTOSCOPIC LITHOLAPAXY N/A 6/27/2019    Procedure: CYSTOLITHOLAPAXY;  Surgeon: Shireen Mayo MD;  Location: [facility] OR 2ND FLR;  Service: Urology;  Laterality: N/A;    CYSTOSCOPIC LITHOLAPAXY N/A 9/3/2019    Procedure: CYSTOLITHOLAPAXY;  Surgeon: Shireen Mayo MD;  Location: [facility] OR 2ND FLR;  Service: Urology;  Laterality: N/A;    CYSTOSCOPY N/A 7/13/2021    Procedure: CYSTOSCOPY;  Surgeon: Shireen Mayo MD;  Location: [facility] OR 1ST FLR;  Service: Urology;  Laterality: N/A;    CYSTOSCOPY  11/16/2021    Procedure: CYSTOSCOPY;  Surgeon: Shireen Mayo MD;  Location: [facility] OR 1ST FLR;  Service: Urology;;    CYSTOSCOPY  7/19/2022    Procedure: CYSTOSCOPY;  Surgeon: Shireen Mayo MD;  Location: [facility] OR 1ST FLR;  Service: Urology;;    CYSTOSCOPY WITH INJECTION OF PERIURETHRAL BULKING AGENT  7/19/2022    Procedure: CYSTOSCOPY, WITH PERIURETHRAL BULKING AGENT INJECTION-MACROPLASTIQUE;  Surgeon: Shireen Mayo MD;  Location: [facility] OR 1ST FLR;  Service: Urology;;    CYSTOSCOPY WITH INJECTION OF PERIURETHRAL BULKING AGENT N/A 3/28/2023    Procedure: CYSTOSCOPY, WITH PERIURETHRAL BULKING AGENT INJECTION;  Surgeon:  Shireen Mayo MD;  [facility];  Service: Urology;  Laterality: N/A;  [place]    CYSTOSCOPY WITH INJECTION OF PERIURETHRAL BULKING AGENT N/A 11/14/2023    Procedure: CYSTOSCOPY, WITH PERIURETHRAL BULKING AGENT INJECTION;  Surgeon: Shireen Mayo MD;  [facility];  Service: Urology;  Laterality: N/A;    CYSTOSCOPY,WITH BOTULINUM TOXIN INJECTION N/A 12/13/2022    Procedure: CYSTOSCOPY,WITH BOTULINUM TOXIN INJECTION;  Surgeon: Shireen Mayo MD;  [facility];  Service: Urology;  Laterality: N/A;  300 U    CYSTOSCOPY,WITH BOTULINUM TOXIN INJECTION N/A 3/28/2023    Procedure: CYSTOSCOPY,WITH BOTULINUM TOXIN INJECTION;  Surgeon: Shireen Mayo MD;  [facility];  Service: Urology;  Laterality: N/A;  45 MIN.    300 UNITS    ESOPHAGOGASTRODUODENOSCOPY N/A 5/23/2018    Procedure: ESOPHAGOGASTRODUODENOSCOPY (EGD);  Surgeon: Prince Max MD;  Location: [facility] (4TH FLR);  Service: Endoscopy;  Laterality: N/A;  r/s 'd per Dr. Max due to family emergency- ER    ESOPHAGOGASTRODUODENOSCOPY N/A 6/23/2023    Procedure: EGD (ESOPHAGOGASTRODUODENOSCOPY);  Surgeon: Juaquin Barry MD;  Location: [facility] (2ND FLR);  Service: Endoscopy;  Laterality: N/A;  Refferal: PRINCE MAX  Order  tele enc 5/18/23  dx: history of a Nissen fundoplication  Additional Scheduling Information:  On home oxygen 2nd floor for airway protection also with a pain pump elevated BMI close to 40   Prep Gastroparesis   ins    ESOPHAGOGASTRODUODENOSCOPY N/A 8/12/2024    Procedure: EGD (ESOPHAGOGASTRODUODENOSCOPY);  Surgeon: Cat Cortés MD;  Location: [facility] ENDO (2ND FLR);  Service: Endoscopy;  Laterality: N/A;  referral dr max / prep inst mailed / gastropareisis/  8/5-called pt for pre call-unable to lvm-portal message sent-tb-[facility]-intrathecal pain pump  8/7 precall complete -ml    HYSTERECTOMY  1975    endometriosis    INJECTION OF BOTULINUM TOXIN TYPE  A  7/13/2021    Procedure: INJECTION, BOTULINUM TOXIN, 200units;  Surgeon: Shireen Mayo MD;  [facility];  Service: Urology;;    INJECTION OF BOTULINUM TOXIN TYPE A  11/16/2021    Procedure: INJECTION, BOTULINUM TOXIN, 200units;  Surgeon: Shireen Mayo MD;  [facility];  Service: Urology;;    INJECTION OF BOTULINUM TOXIN TYPE A  7/19/2022    Procedure: INJECTION, BOTULINUM TOXIN, 300 units ;  Surgeon: Shireen Mayo MD;  [facility];  Service: Urology;;    INJECTION OF BOTULINUM TOXIN TYPE A N/A 11/14/2023    Procedure: INJECTION, BOTULINUM TOXIN, TYPE A;  Surgeon: Shireen Mayo MD;  Location: [facility] OR [address] [place] [address];  Service: Urology;  Laterality: N/A;    INSERTION, SUPRAPUBIC CATHETER N/A 12/13/2022    Procedure: INSERTION, SUPRAPUBIC CATHETER;  Surgeon: Shireen Mayo MD;  Location: [facility] OR [address] [place] [address];  Service: Urology;  Laterality: N/A;  exchange    INSERTION, SUPRAPUBIC CATHETER N/A 11/14/2023    Procedure: INSERTION, SUPRAPUBIC CATHETER;  Surgeon: Shireen Mayo MD;  Location: [facility] OR [address] [place] [address];  Service: Urology;  Laterality: N/A;  EXCHANGE of s/p tube    pain pump placement      SQ Dilaudid Pump managed by Dr. Hillman, Pain Management    REMOVAL OF BONE SPUR OF FOOT Bilateral 9/16/2022    Procedure: EXCISION ARTHRITIC BONE, BILATERAL FOOT;  Surgeon: NICOLA Mcgrath;  Location: [facility];  Service: Podiatry;  Laterality: Bilateral;    REPLACEMENT OF BACLOFEN PUMP N/A 8/2/2023    Procedure: REPLACEMENT, BACLOFEN PUMP;  Surgeon: Smitha Condon MD;  Location: [facility] OR 2ND [facility];  Service: Neurosurgery;  Laterality: N/A;  Anes: Gen  Blood: Type & Screen  Pos: Left Lat  Intrathecal Pump  Bed: Reg Reversed  Rad: C-arm  Pump: 40 cc, [employer]tronics    REPLACEMENT OF CATHETER N/A 10/31/2019    Procedure: REPLACEMENT, CATHETER-SUPRAPUBIC;  Surgeon: Shireen Mayo MD;  Location: [facility] OR [address] [place] [address];  Service: Urology;  Laterality: N/A;    SPINAL CORD STIMULATOR  REMOVAL      before [place]    SPINE SURGERY  5-13-13    CERVICAL FUSION    TONSILLECTOMY         Review of patient's allergies indicates:   Allergen Reactions    (d)-limonene flavor      Other reaction(s): difficult intubation  Other reaction(s): Difficulty breathing    Benadryl [diphenhydramine hcl] Shortness Of Breath    Fentanyl Itching, Nausea And Vomiting and Swelling             Imitrex [sumatriptan succinate] Shortness Of Breath    Oxycodone-acetaminophen Shortness Of Breath    Sulfamethoxazole-trimethoprim Anaphylaxis    Topiramate Shortness Of Breath    Vancomycin Anaphylaxis     Rash    Butorphanol tartrate     Darvocet a500 [propoxyphene n-acetaminophen]      Other reaction(s): Difficulty breathing    Evening primrose (oenothera biennis)     Latex     Pregabalin Other (See Comments)     tremors    Sumatriptan     White petrolatum-zinc     Zinc acetate     Zinc oxide-white petrolatum      Other reaction(s): Difficulty breathing    Latex, natural rubber Itching and Rash    Phenytoin Rash and Other (See Comments)     Trouble breathing       No current facility-administered medications on file prior to encounter.     Current Outpatient Medications on File Prior to Encounter   Medication Sig    albuterol (PROVENTIL/VENTOLIN HFA) 90 mcg/actuation inhaler Inhale 2 puffs into the lungs every 6 (six) hours as needed for Shortness of Breath or Wheezing. Rescue    amitriptyline (ELAVIL) 25 MG tablet Take 1 tablet (25 mg total) by mouth every evening.    atorvastatin (LIPITOR) 80 MG tablet TAKE ONE TABLET BY MOUTH ONCE DAILY    butalbital-aspirin-caffeine -40 mg (FIORINAL) -40 mg Cap Take 1 capsule by mouth every 4 (four) hours as needed.    calcium-vitamin D3 (OS-JACKIE 500 + D3) 500 mg-5 mcg (200 unit) per tablet Take 2 tablets by mouth 2 (two) times daily.    cyanocobalamin, vitamin B-12, 5,000 mcg Subl Place 1 tablet under the tongue once daily.    darifenacin (ENABLEX) 7.5 MG Tb24 Take 1 tablet (7.5 mg  total) by mouth once daily.    docusate sodium (COLACE) 100 MG capsule Take 200 mg by mouth every evening.    doxycycline (VIBRAMYCIN) 100 MG Cap Take 100 mg by mouth every 12 (twelve) hours.    DULoxetine (CYMBALTA) 60 MG capsule Take 1 capsule (60 mg total) by mouth 2 (two) times daily.    furosemide (LASIX) 40 MG tablet Take 1 tablet (40 mg total) by mouth once daily.    HYDROcodone-acetaminophen (NORCO)  mg per tablet Take 1 tablet by mouth 5 (five) times daily.    hydrocortisone (CORTEF) 10 MG Tab Take 1 tablet (10 mg total) by mouth once daily.    hydrocortisone (CORTEF) 5 MG Tab Take 1 tablet (5 mg total) by mouth before dinner.    intrathecal pain pump compound Hydromorphone (7.5 mg/mL) infusion at 8.59 mg/day (0.3578 mg/hr) out of a total reservoir volume of 40 mL  Pump filled monthly    levothyroxine (SYNTHROID) 150 MCG tablet Take 1 tablet (150 mcg total) by mouth before breakfast.    menthol (BIOFREEZE, MENTHOL,) 10 % Crea Apply topically as needed.    neomycin-polymyxin-hydrocortisone (CORTISPORIN) 3.5-10,000-1 mg/mL-unit/mL-% otic suspension Place 3 drops into both ears 4 (four) times daily.    nitroGLYCERIN (NITROSTAT) 0.4 MG SL tablet Place 0.4 mg under the tongue every 5 (five) minutes as needed for Chest pain.    pantoprazole (PROTONIX) 40 MG tablet Take 1 tablet (40 mg total) by mouth once daily.    QUEtiapine (SEROQUEL) 100 MG Tab Take 1 tablet (100 mg total) by mouth nightly.    QUEtiapine (SEROQUEL) 25 MG Tab Take 1 tablet (25 mg total) by mouth once daily.    senna (SENNA LAX) 8.6 mg tablet Take 2 tablets by mouth 2 (two) times daily.    tiotropium bromide (SPIRIVA RESPIMAT) 2.5 mcg/actuation inhaler Inhale 2 puffs into the lungs once daily. Controller. Please restart taking.    tobramycin-dexAMETHasone 0.3-0.1% (TOBRADEX) 0.3-0.1 % DrpS Place 1 drop into both eyes every 6 (six) hours while awake.    traMADoL (ULTRAM) 50 mg tablet Take 50 mg by mouth every 12 (twelve) hours as needed for  "Pain.    traZODone (DESYREL) 300 MG tablet Take 300 mg by mouth every evening.     Family History       Problem Relation (Age of Onset)    Cancer Mother (55), Father    Cirrhosis Paternal Aunt, Paternal Uncle    Colon cancer Maternal Uncle    Esophageal cancer Father    Heart disease Maternal Uncle    Liver disease Paternal Aunt, Paternal Uncle    No Known Problems Brother, Brother, Sister, Maternal Aunt, Maternal Grandfather, Paternal Grandmother, Paternal Grandfather    Parkinsonism Maternal Grandmother    Tremor Maternal Grandmother          Tobacco Use    Smoking status: Never    Smokeless tobacco: Never   Substance and Sexual Activity    Alcohol use: Not Currently    Drug use: Yes     Types: Marijuana     Comment: "medical marijuana gummies"    Sexual activity: Not on file     Review of Systems   Constitutional:  Positive for activity change, appetite change and chills.   HENT: Negative.     Eyes: Negative.    Respiratory: Negative.     Cardiovascular: Negative.    Gastrointestinal:  Positive for nausea.   Endocrine: Negative.    Genitourinary:  Positive for dysuria.   Musculoskeletal: Negative.    Skin:  Positive for color change and rash.   Neurological:  Positive for headaches.   Psychiatric/Behavioral:  Positive for agitation.      Objective:     Vital Signs (Most Recent):  Temp: 98.3 °F (36.8 °C) (11/10/24 0437)  Pulse: 75 (11/10/24 0437)  Resp: 16 (11/10/24 0437)  BP: 115/71 (11/10/24 0437)  SpO2: (!) 88 % (11/10/24 0437) Vital Signs (24h Range):  Temp:  [97.7 °F (36.5 °C)-98.4 °F (36.9 °C)] 98.3 °F (36.8 °C)  Pulse:  [75-87] 75  Resp:  [16-20] 16  SpO2:  [88 %-99 %] 88 %  BP: (110-126)/(54-71) 115/71     Weight: 100.1 kg (220 lb 10.9 oz)  Body mass index is 34.56 kg/m².     Physical Exam  Constitutional:       Appearance: She is ill-appearing.   HENT:      Head: Normocephalic.      Nose: Nose normal.      Mouth/Throat:      Mouth: Mucous membranes are dry.      Pharynx: Oropharynx is clear.   Eyes:      " "Conjunctiva/sclera: Conjunctivae normal.      Pupils: Pupils are equal, round, and reactive to light.   Cardiovascular:      Rate and Rhythm: Normal rate.   Pulmonary:      Effort: Pulmonary effort is normal.   Abdominal:      General: Abdomen is flat. Bowel sounds are normal.      Palpations: Abdomen is soft.   Musculoskeletal:         General: Swelling present.      Cervical back: Normal range of motion.      Right lower leg: Edema present.      Left lower leg: Edema present.   Skin:     General: Skin is warm.      Capillary Refill: Capillary refill takes less than 2 seconds.      Findings: Rash present.   Neurological:      General: No focal deficit present.      Mental Status: She is alert and oriented to person, place, and time.   Psychiatric:         Mood and Affect: Mood normal.              CRANIAL NERVES     CN III, IV, VI   Pupils are equal, round, and reactive to light.       Significant Labs: All pertinent labs within the past 24 hours have been reviewed.  A1C: No results for input(s): "HGBA1C" in the last 4320 hours.  ABGs: No results for input(s): "PH", "PCO2", "HCO3", "POCSATURATED", "BE", "TOTALHB", "COHB", "METHB", "O2HB", "POCFIO2", "PO2" in the last 48 hours.  Bilirubin:   Recent Labs   Lab 10/17/24  1329 11/09/24 2016   BILITOT 0.6 0.6     Blood Culture: No results for input(s): "LABBLOO" in the last 48 hours.  BMP:   Recent Labs   Lab 11/09/24 2016   GLU 98      K 3.5      CO2 26   BUN 12   CREATININE 1.0   CALCIUM 9.7     CBC:   Recent Labs   Lab 11/09/24  2016   WBC 6.99   HGB 11.4*   HCT 36.2*        CMP:   Recent Labs   Lab 11/09/24 2016      K 3.5      CO2 26   GLU 98   BUN 12   CREATININE 1.0   CALCIUM 9.7   PROT 7.6   ALBUMIN 3.6   BILITOT 0.6   ALKPHOS 129   AST 10   ALT 8*   ANIONGAP 11     Cardiac Markers: No results for input(s): "CKMB", "MYOGLOBIN", "BNP", "TROPISTAT" in the last 48 hours.  Coagulation: No results for input(s): "PT", "INR", "APTT" in " the last 48 hours.  Lactic Acid:   Recent Labs   Lab 11/09/24 2016   LACTATE 1.1       Significant Imaging: I have reviewed all pertinent imaging results/findings within the past 24 hours.  I have reviewed and interpreted all pertinent imaging results/findings within the past 24 hours.

## 2024-11-10 NOTE — ED NOTES
Patient states suprapubic catheter and bag both changed out yesterday by home health nurse, confirmed with  that urine may be collected from bag versus directly from catheter. Notified primary nurse REJI Hdz of same.

## 2024-11-10 NOTE — ED PROVIDER NOTES
Encounter Date: 11/9/2024       History     Chief Complaint   Patient presents with    Urinary Retention    Abdominal Pain     Patient reports to ER complaining of urinary retention and lower ABD pain. Patient states this has been getting progressively worse since last week. Patient has a catheter.      Patient is a 69-year-old female with a history of a neurogenic bladder who complains of suprapubic abdominal pain.  Patient currently has an indwelling Motley catheter and believes she has a urinary tract infection.  She denies fever or chills.  No nausea or vomiting.  She also complains of increasing redness to both of her lower legs.      Review of patient's allergies indicates:   Allergen Reactions    (d)-limonene flavor      Other reaction(s): difficult intubation  Other reaction(s): Difficulty breathing    Benadryl [diphenhydramine hcl] Shortness Of Breath    Fentanyl Itching, Nausea And Vomiting and Swelling             Imitrex [sumatriptan succinate] Shortness Of Breath    Oxycodone-acetaminophen Shortness Of Breath    Sulfamethoxazole-trimethoprim Anaphylaxis    Topiramate Shortness Of Breath    Vancomycin Anaphylaxis     Rash    Butorphanol tartrate     Darvocet a500 [propoxyphene n-acetaminophen]      Other reaction(s): Difficulty breathing    Evening primrose (oenothera biennis)     Latex     Pregabalin Other (See Comments)     tremors    Sumatriptan     White petrolatum-zinc     Zinc acetate     Zinc oxide-white petrolatum      Other reaction(s): Difficulty breathing    Latex, natural rubber Itching and Rash    Phenytoin Rash and Other (See Comments)     Trouble breathing     Past Medical History:   Diagnosis Date    Anxiety     Arthritis     Bilateral lower extremity edema     severe chronic    Carotid artery occlusion     Cataract     CHF (congestive heart failure)     Coronary artery disease     subtotalled LAD with collateral    Depression     Fever blister     Hard of hearing     Hypokalemia  01/09/2023    Hyponatremia 02/04/2022    Hypothyroid     Iron deficiency anemia     Lumbar radiculopathy     with chronic pain    Ocular migraine     Other emphysema 05/22/2023    Renal disorder     Sleep apnea     cpap     Past Surgical History:   Procedure Laterality Date    ADENOIDECTOMY      BACK SURGERY      x 12    CARDIAC CATHETERIZATION  2016    subtotalled LAD with right to left collaterals    CATARACT EXTRACTION W/  INTRAOCULAR LENS IMPLANT Left     Dr Coleman     CYSTOSCOPIC LITHOLAPAXY N/A 6/27/2019    Procedure: CYSTOLITHOLAPAXY;  Surgeon: Shireen Mayo MD;  Location: [facility] OR 2ND FLR;  Service: Urology;  Laterality: N/A;    CYSTOSCOPIC LITHOLAPAXY N/A 9/3/2019    Procedure: CYSTOLITHOLAPAXY;  Surgeon: Shireen Mayo MD;  Location: [facility] OR 2ND FLR;  Service: Urology;  Laterality: N/A;    CYSTOSCOPY N/A 7/13/2021    Procedure: CYSTOSCOPY;  Surgeon: Shireen Mayo MD;  Location: [facility] OR 1ST FLR;  Service: Urology;  Laterality: N/A;    CYSTOSCOPY  11/16/2021    Procedure: CYSTOSCOPY;  Surgeon: Shireen Mayo MD;  Location: [facility] OR 1ST FLR;  Service: Urology;;    CYSTOSCOPY  7/19/2022    Procedure: CYSTOSCOPY;  Surgeon: Shireen Mayo MD;  Location: [facility] OR 1ST FLR;  Service: Urology;;    CYSTOSCOPY WITH INJECTION OF PERIURETHRAL BULKING AGENT  7/19/2022    Procedure: CYSTOSCOPY, WITH PERIURETHRAL BULKING AGENT INJECTION-MACROPLASTIQUE;  Surgeon: Shireen Mayo MD;  Location: [facility] OR 1ST FLR;  Service: Urology;;    CYSTOSCOPY WITH INJECTION OF PERIURETHRAL BULKING AGENT N/A 3/28/2023    Procedure: CYSTOSCOPY, WITH PERIURETHRAL BULKING AGENT INJECTION;  Surgeon: Shireen Mayo MD;  Location: [facility] OR 1ST FLR;  Service: Urology;  Laterality: N/A;  [place]    CYSTOSCOPY WITH INJECTION OF PERIURETHRAL BULKING AGENT N/A 11/14/2023    Procedure: CYSTOSCOPY, WITH PERIURETHRAL BULKING AGENT INJECTION;  Surgeon: Shireen Mayo MD;  Location: [facility] OR 1ST FLR;  Service: Urology;  Laterality:  N/A;    CYSTOSCOPY,WITH BOTULINUM TOXIN INJECTION N/A 12/13/2022    Procedure: CYSTOSCOPY,WITH BOTULINUM TOXIN INJECTION;  Surgeon: Shireen Mayo MD;  Location: [facility] OR 1ST FLR;  Service: Urology;  Laterality: N/A;  300 U    CYSTOSCOPY,WITH BOTULINUM TOXIN INJECTION N/A 3/28/2023    Procedure: CYSTOSCOPY,WITH BOTULINUM TOXIN INJECTION;  Surgeon: Shireen Mayo MD;  Location: [facility] OR 1ST FLR;  Service: Urology;  Laterality: N/A;  45 MIN.    300 UNITS    ESOPHAGOGASTRODUODENOSCOPY N/A 5/23/2018    Procedure: ESOPHAGOGASTRODUODENOSCOPY (EGD);  Surgeon: Prince Max MD;  Location: [facility] (4TH FLR);  Service: Endoscopy;  Laterality: N/A;  r/s 'd per Dr. Max due to family emergency- ER    ESOPHAGOGASTRODUODENOSCOPY N/A 6/23/2023    Procedure: EGD (ESOPHAGOGASTRODUODENOSCOPY);  Surgeon: Juaquin Barry MD;  Location: [facility] (2ND FLR);  Service: Endoscopy;  Laterality: N/A;  Refferal: PRINCE MAX  Order  tele enc 5/18/23  dx: history of a Nissen fundoplication  Additional Scheduling Information:  On home oxygen 2nd floor for airway protection also with a pain pump elevated BMI close to 40   Prep Gastroparesis   ins    ESOPHAGOGASTRODUODENOSCOPY N/A 8/12/2024    Procedure: EGD (ESOPHAGOGASTRODUODENOSCOPY);  Surgeon: Cat Cortés MD;  Location: [facility] (2ND FLR);  Service: Endoscopy;  Laterality: N/A;  referral dr max / prep inst mailed / gastropareisis/  8/5-called pt for pre call-unable to m-portal message sent-tb-[employer]-intrathecal pain pump  8/7 precall complete -ml    HYSTERECTOMY  1975    endometriosis    INJECTION OF BOTULINUM TOXIN TYPE A  7/13/2021    Procedure: INJECTION, BOTULINUM TOXIN, 200units;  Surgeon: Shireen Mayo MD;  Location: [facility] OR 1ST FLR;  Service: Urology;;    INJECTION OF BOTULINUM TOXIN TYPE A  11/16/2021    Procedure: INJECTION, BOTULINUM TOXIN, 200units;  Surgeon: Shireen Mayo MD;  Location: [facility] OR [address] [place] [address];  Service: Urology;;     INJECTION OF BOTULINUM TOXIN TYPE A  7/19/2022    Procedure: INJECTION, BOTULINUM TOXIN, 300 units ;  Surgeon: Shireen Mayo MD;  Location: [facility] OR [address] [place] [address];  Service: Urology;;    INJECTION OF BOTULINUM TOXIN TYPE A N/A 11/14/2023    Procedure: INJECTION, BOTULINUM TOXIN, TYPE A;  Surgeon: Shireen Mayo MD;  [facility];  Service: Urology;  Laterality: N/A;    INSERTION, SUPRAPUBIC CATHETER N/A 12/13/2022    Procedure: INSERTION, SUPRAPUBIC CATHETER;  Surgeon: Shireen Mayo MD;  [facility];  Service: Urology;  Laterality: N/A;  exchange    INSERTION, SUPRAPUBIC CATHETER N/A 11/14/2023    Procedure: INSERTION, SUPRAPUBIC CATHETER;  Surgeon: Shireen Mayo MD;  Location: [facility] OR [address] [place] [address];  Service: Urology;  Laterality: N/A;  EXCHANGE of s/p tube    pain pump placement      SQ Dilaudid Pump managed by Dr. Hillman, Pain Management    REMOVAL OF BONE SPUR OF FOOT Bilateral 9/16/2022    Procedure: EXCISION ARTHRITIC BONE, BILATERAL FOOT;  Surgeon: NICOLA Mcgrath;  Location: [facility] OR;  Service: Podiatry;  Laterality: Bilateral;    REPLACEMENT OF BACLOFEN PUMP N/A 8/2/2023    Procedure: REPLACEMENT, BACLOFEN PUMP;  Surgeon: Smitha Condon MD;  Location: [facility] OR 2ND FLR;  Service: Neurosurgery;  Laterality: N/A;  Anes: Gen  Blood: Type & Screen  Pos: Left Lat  Intrathecal Pump  Bed: Reg Reversed  Rad: C-arm  Pump: 40 cc, [employer]    REPLACEMENT OF CATHETER N/A 10/31/2019    Procedure: REPLACEMENT, CATHETER-SUPRAPUBIC;  Surgeon: Shireen Mayo MD;  Location: [facility] OR [address] [place] [address];  Service: Urology;  Laterality: N/A;    SPINAL CORD STIMULATOR REMOVAL      before [facility]    SPINE SURGERY  5-13-13    CERVICAL FUSION    TONSILLECTOMY       Family History   Problem Relation Name Age of Onset    Cancer Mother  55        breast    Cancer Father          esophagus,had laryngectomy    Esophageal cancer Father      Parkinsonism Maternal Grandmother      Tremor Maternal  "Grandmother      No Known Problems Brother      No Known Problems Brother      Heart disease Maternal Uncle klarissa     Colon cancer Maternal Uncle klarissa         Less than 60    No Known Problems Sister      No Known Problems Maternal Aunt      Cirrhosis Paternal Aunt          ETOH    Liver disease Paternal Aunt          ETOH    Liver disease Paternal Uncle          ETOH    Cirrhosis Paternal Uncle          ETOH    No Known Problems Maternal Grandfather      No Known Problems Paternal Grandmother      No Known Problems Paternal Grandfather      Melanoma Neg Hx      Amblyopia Neg Hx      Blindness Neg Hx      Cataracts Neg Hx      Diabetes Neg Hx      Glaucoma Neg Hx      Hypertension Neg Hx      Macular degeneration Neg Hx      Retinal detachment Neg Hx      Strabismus Neg Hx      Stroke Neg Hx      Thyroid disease Neg Hx      Celiac disease Neg Hx      Colon polyps Neg Hx      Cystic fibrosis Neg Hx      Crohn's disease Neg Hx      Inflammatory bowel disease Neg Hx      Liver cancer Neg Hx      Rectal cancer Neg Hx      Stomach cancer Neg Hx      Ulcerative colitis Neg Hx      Lymphoma Neg Hx       Social History     Tobacco Use    Smoking status: Never    Smokeless tobacco: Never   Substance Use Topics    Alcohol use: Not Currently    Drug use: Yes     Types: Marijuana     Comment: "medical marijuana gummies"     Review of Systems   Constitutional:  Negative for fever.   Gastrointestinal:  Positive for abdominal pain. Negative for vomiting.   Skin:  Positive for color change.   All other systems reviewed and are negative.      Physical Exam     Initial Vitals [11/09/24 1658]   BP Pulse Resp Temp SpO2   (!) 113/56 85 18 97.7 °F (36.5 °C) 95 %      MAP       --         Physical Exam    Nursing note and vitals reviewed.  Constitutional: No distress.   Cardiovascular:  Normal rate, regular rhythm and normal heart sounds.           Pulmonary/Chest: Breath sounds normal.   Abdominal: Abdomen is soft. Bowel sounds are " normal.   There is suprapubic tenderness with voluntary guarding.  No rebound.   Musculoskeletal:      Comments: There is lymphedema of both lower extremities.  Erythema noted to both lower legs; no warmth or areas of fluctuance.     Neurological: She is alert and oriented to person, place, and time.   Skin: Skin is warm and dry.   Psychiatric: Thought content normal.         ED Course   Procedures  Labs Reviewed   CULTURE, BLOOD   CULTURE, BLOOD   CBC W/ AUTO DIFFERENTIAL   COMPREHENSIVE METABOLIC PANEL   LACTIC ACID, PLASMA   URINALYSIS          Imaging Results    None          Medications - No data to display  Medical Decision Making  Emergent evaluation of a 69-year-old female with a history of neurogenic bladder now having suprapubic abdominal pain concern for UTI.  Patient also has redness to both of her lower extremities.  Entire workup is pending at the time of shift change and further care of the patient will be turned over to Dr. Muniz pending results, reassessment and final disposition.    Amount and/or Complexity of Data Reviewed  Labs: ordered. Decision-making details documented in ED Course.    Risk  OTC drugs.  Prescription drug management.  Decision regarding hospitalization.               ED Course as of 11/12/24 1646   Sat Nov 09, 2024 2123 RBC, UA(!): 80 [LC]   2123 WBC, UA(!): >100 [LC]   2123 WBC Clumps, UA(!): Occasional [LC]   2123 NITRITE UA(!): Positive [LC]   2128 Case discussed with the [facility] hospitalist will admit the patient for urinary tract infection, questionable bilateral cellulitis. [LC]      ED Course User Index  [LC] Tushar Muniz MD                           Clinical Impression:  Final diagnoses:  [I89.0] Lymphedema of both lower extremities (Primary)                 Sanket Castro MD  11/12/24 1647

## 2024-11-10 NOTE — H&P
[facility] Medicine  History & Physical    Patient Name: Tasha Hawley  MRN: [id number]  Patient Class: IP- Inpatient  Admission Date: 11/9/2024  Attending Physician: Damien Kearney MD   Primary Care Provider: Mesfin Hodges II, MD         Patient information was obtained from patient and ER records.     Subjective:     Principal Problem:Cellulitis of left lower extremity    Chief Complaint:   Chief Complaint   Patient presents with    Urinary Retention    Abdominal Pain     Patient reports to ER complaining of urinary retention and lower ABD pain. Patient states this has been getting progressively worse since last week. Patient has a catheter.         HPI:   Very pleasant 68 y/o F , obesity , chronic pain syndrome s/p dilaudid pain pump, CAD, HTN, presents with chills, subjective fevers , LLE swelling redness, tender, non purulent , 8/10 actue pain over past 2 days   Dysuria , sx of UTI   No sick contacts or travel     Past Medical History:   Diagnosis Date    Anxiety     Arthritis     Bilateral lower extremity edema     severe chronic    Carotid artery occlusion     Cataract     CHF (congestive heart failure)     Coronary artery disease     subtotalled LAD with collateral    Depression     Fever blister     Hard of hearing     Hypokalemia 01/09/2023    Hyponatremia 02/04/2022    Hypothyroid     Iron deficiency anemia     Lumbar radiculopathy     with chronic pain    Ocular migraine     Other emphysema 05/22/2023    Renal disorder     Sleep apnea     cpap       Past Surgical History:   Procedure Laterality Date    ADENOIDECTOMY      BACK SURGERY      x 12    CARDIAC CATHETERIZATION  2016    subtotalled LAD with right to left collaterals    CATARACT EXTRACTION W/  INTRAOCULAR LENS IMPLANT Left     Dr Coleman     CYSTOSCOPIC LITHOLAPAXY N/A 6/27/2019    Procedure: CYSTOLITHOLAPAXY;  Surgeon: Shireen Mayo MD;  Location: [facility] OR [address] [place] [address];  Service: Urology;  Laterality: N/A;     CYSTOSCOPIC LITHOLAPAXY N/A 9/3/2019    Procedure: CYSTOLITHOLAPAXY;  Surgeon: Shireen Mayo MD;  Location: [facility] OR 2ND FLR;  Service: Urology;  Laterality: N/A;    CYSTOSCOPY N/A 7/13/2021    Procedure: CYSTOSCOPY;  Surgeon: Shireen Mayo MD;  Location: [facility] OR 1ST FLR;  Service: Urology;  Laterality: N/A;    CYSTOSCOPY  11/16/2021    Procedure: CYSTOSCOPY;  Surgeon: Shireen Mayo MD;  Location: [facility] OR 1ST FLR;  Service: Urology;;    CYSTOSCOPY  7/19/2022    Procedure: CYSTOSCOPY;  Surgeon: Shireen Mayo MD;  Location: [facility] OR 1ST FLR;  Service: Urology;;    CYSTOSCOPY WITH INJECTION OF PERIURETHRAL BULKING AGENT  7/19/2022    Procedure: CYSTOSCOPY, WITH PERIURETHRAL BULKING AGENT INJECTION-MACROPLASTIQUE;  Surgeon: Shireen Mayo MD;  Location: [facility] OR 1ST FLR;  Service: Urology;;    CYSTOSCOPY WITH INJECTION OF PERIURETHRAL BULKING AGENT N/A 3/28/2023    Procedure: CYSTOSCOPY, WITH PERIURETHRAL BULKING AGENT INJECTION;  Surgeon: Shireen Mayo MD;  Location: [facility] OR 1ST FLR;  Service: Urology;  Laterality: N/A;  [place]    CYSTOSCOPY WITH INJECTION OF PERIURETHRAL BULKING AGENT N/A 11/14/2023    Procedure: CYSTOSCOPY, WITH PERIURETHRAL BULKING AGENT INJECTION;  Surgeon: Shireen Mayo MD;  Location: [facility] OR 1ST FLR;  Service: Urology;  Laterality: N/A;    CYSTOSCOPY,WITH BOTULINUM TOXIN INJECTION N/A 12/13/2022    Procedure: CYSTOSCOPY,WITH BOTULINUM TOXIN INJECTION;  Surgeon: Shireen Mayo MD;  Location: [facility] OR 1ST FLR;  Service: Urology;  Laterality: N/A;  300 U    CYSTOSCOPY,WITH BOTULINUM TOXIN INJECTION N/A 3/28/2023    Procedure: CYSTOSCOPY,WITH BOTULINUM TOXIN INJECTION;  Surgeon: Shireen Mayo MD;  Location: [facility] OR 1ST FLR;  Service: Urology;  Laterality: N/A;  45 MIN.    300 UNITS    ESOPHAGOGASTRODUODENOSCOPY N/A 5/23/2018    Procedure: ESOPHAGOGASTRODUODENOSCOPY (EGD);  Surgeon: Prince Vance MD;  Location: [address]);  Service: Endoscopy;  Laterality:  N/A;  r/s 'd per Dr. Max due to family emergency- ER    ESOPHAGOGASTRODUODENOSCOPY N/A 6/23/2023    Procedure: EGD (ESOPHAGOGASTRODUODENOSCOPY);  Surgeon: Juaquin Baryr MD;  Location: [facility] (2ND FLR);  Service: Endoscopy;  Laterality: N/A;  Refferal: ROSEANNE MAX  Order  tele enc 5/18/23  dx: history of a Nissen fundoplication  Additional Scheduling Information:  On home oxygen 2nd floor for airway protection also with a pain pump elevated BMI close to 40   Prep Gastroparesis   ins    ESOPHAGOGASTRODUODENOSCOPY N/A 8/12/2024    Procedure: EGD (ESOPHAGOGASTRODUODENOSCOPY);  Surgeon: Cat Cortés MD;  Location: [facility] ENDO (2ND FLR);  Service: Endoscopy;  Laterality: N/A;  referral dr max / prep inst mailed / gastropareisis/  8/5-called pt for pre call-unable to lv-portal message sent-tb-[facility]-intrathecal pain pump  8/7 precall complete -ml    HYSTERECTOMY  1975    endometriosis    INJECTION OF BOTULINUM TOXIN TYPE A  7/13/2021    Procedure: INJECTION, BOTULINUM TOXIN, 200units;  Surgeon: Shireen Mayo MD;  [facility];  Service: Urology;;    INJECTION OF BOTULINUM TOXIN TYPE A  11/16/2021    Procedure: INJECTION, BOTULINUM TOXIN, 200units;  Surgeon: Shireen Mayo MD;  [facility];  Service: Urology;;    INJECTION OF BOTULINUM TOXIN TYPE A  7/19/2022    Procedure: INJECTION, BOTULINUM TOXIN, 300 units ;  Surgeon: Shireen Mayo MD;  [facility];  Service: Urology;;    INJECTION OF BOTULINUM TOXIN TYPE A N/A 11/14/2023    Procedure: INJECTION, BOTULINUM TOXIN, TYPE A;  Surgeon: Shireen Mayo MD;  [facility];  Service: Urology;  Laterality: N/A;    INSERTION, SUPRAPUBIC CATHETER N/A 12/13/2022    Procedure: INSERTION, SUPRAPUBIC CATHETER;  Surgeon: Shireen Mayo MD;  Location: [facility] OR 1ST FLR;  Service: Urology;  Laterality: N/A;  exchange    INSERTION, SUPRAPUBIC CATHETER N/A 11/14/2023    Procedure:  INSERTION, SUPRAPUBIC CATHETER;  Surgeon: Shireen Mayo MD;  Location: [facility] OR 1ST FLR;  Service: Urology;  Laterality: N/A;  EXCHANGE of s/p tube    pain pump placement      SQ Dilaudid Pump managed by Dr. Hillman, Pain Management    REMOVAL OF BONE SPUR OF FOOT Bilateral 9/16/2022    Procedure: EXCISION ARTHRITIC BONE, BILATERAL FOOT;  Surgeon: Adam Mcguire DPM;  Location: [facility] OR;  Service: Podiatry;  Laterality: Bilateral;    REPLACEMENT OF BACLOFEN PUMP N/A 8/2/2023    Procedure: REPLACEMENT, BACLOFEN PUMP;  Surgeon: Smitha Condon MD;  Location: [facility] OR 2ND FLR;  Service: Neurosurgery;  Laterality: N/A;  Anes: Gen  Blood: Type & Screen  Pos: Left Lat  Intrathecal Pump  Bed: Reg Reversed  Rad: C-arm  Pump: 40 cc, medtronics    REPLACEMENT OF CATHETER N/A 10/31/2019    Procedure: REPLACEMENT, CATHETER-SUPRAPUBIC;  Surgeon: Shireen Mayo MD;  Location: [facility] OR 1ST FLR;  Service: Urology;  Laterality: N/A;    SPINAL CORD STIMULATOR REMOVAL      before [place]    SPINE SURGERY  5-13-13    CERVICAL FUSION    TONSILLECTOMY         Review of patient's allergies indicates:   Allergen Reactions    (d)-limonene flavor      Other reaction(s): difficult intubation  Other reaction(s): Difficulty breathing    Benadryl [diphenhydramine hcl] Shortness Of Breath    Fentanyl Itching, Nausea And Vomiting and Swelling             Imitrex [sumatriptan succinate] Shortness Of Breath    Oxycodone-acetaminophen Shortness Of Breath    Sulfamethoxazole-trimethoprim Anaphylaxis    Topiramate Shortness Of Breath    Vancomycin Anaphylaxis     Rash    Butorphanol tartrate     Darvocet a500 [propoxyphene n-acetaminophen]      Other reaction(s): Difficulty breathing    Evening primrose (oenothera biennis)     Latex     Pregabalin Other (See Comments)     tremors    Sumatriptan     White petrolatum-zinc     Zinc acetate     Zinc oxide-white petrolatum      Other reaction(s): Difficulty breathing    Latex, natural rubber  Itching and Rash    Phenytoin Rash and Other (See Comments)     Trouble breathing       No current facility-administered medications on file prior to encounter.     Current Outpatient Medications on File Prior to Encounter   Medication Sig    albuterol (PROVENTIL/VENTOLIN HFA) 90 mcg/actuation inhaler Inhale 2 puffs into the lungs every 6 (six) hours as needed for Shortness of Breath or Wheezing. Rescue    amitriptyline (ELAVIL) 25 MG tablet Take 1 tablet (25 mg total) by mouth every evening.    atorvastatin (LIPITOR) 80 MG tablet TAKE ONE TABLET BY MOUTH ONCE DAILY    butalbital-aspirin-caffeine -40 mg (FIORINAL) -40 mg Cap Take 1 capsule by mouth every 4 (four) hours as needed.    calcium-vitamin D3 (OS-JACKIE 500 + D3) 500 mg-5 mcg (200 unit) per tablet Take 2 tablets by mouth 2 (two) times daily.    cyanocobalamin, vitamin B-12, 5,000 mcg Subl Place 1 tablet under the tongue once daily.    darifenacin (ENABLEX) 7.5 MG Tb24 Take 1 tablet (7.5 mg total) by mouth once daily.    docusate sodium (COLACE) 100 MG capsule Take 200 mg by mouth every evening.    doxycycline (VIBRAMYCIN) 100 MG Cap Take 100 mg by mouth every 12 (twelve) hours.    DULoxetine (CYMBALTA) 60 MG capsule Take 1 capsule (60 mg total) by mouth 2 (two) times daily.    furosemide (LASIX) 40 MG tablet Take 1 tablet (40 mg total) by mouth once daily.    HYDROcodone-acetaminophen (NORCO)  mg per tablet Take 1 tablet by mouth 5 (five) times daily.    hydrocortisone (CORTEF) 10 MG Tab Take 1 tablet (10 mg total) by mouth once daily.    hydrocortisone (CORTEF) 5 MG Tab Take 1 tablet (5 mg total) by mouth before dinner.    intrathecal pain pump compound Hydromorphone (7.5 mg/mL) infusion at 8.59 mg/day (0.3578 mg/hr) out of a total reservoir volume of 40 mL  Pump filled monthly    levothyroxine (SYNTHROID) 150 MCG tablet Take 1 tablet (150 mcg total) by mouth before breakfast.    menthol (BIOFREEZE, MENTHOL,) 10 % Crea Apply topically as  "needed.    neomycin-polymyxin-hydrocortisone (CORTISPORIN) 3.5-10,000-1 mg/mL-unit/mL-% otic suspension Place 3 drops into both ears 4 (four) times daily.    nitroGLYCERIN (NITROSTAT) 0.4 MG SL tablet Place 0.4 mg under the tongue every 5 (five) minutes as needed for Chest pain.    pantoprazole (PROTONIX) 40 MG tablet Take 1 tablet (40 mg total) by mouth once daily.    QUEtiapine (SEROQUEL) 100 MG Tab Take 1 tablet (100 mg total) by mouth nightly.    QUEtiapine (SEROQUEL) 25 MG Tab Take 1 tablet (25 mg total) by mouth once daily.    senna (SENNA LAX) 8.6 mg tablet Take 2 tablets by mouth 2 (two) times daily.    tiotropium bromide (SPIRIVA RESPIMAT) 2.5 mcg/actuation inhaler Inhale 2 puffs into the lungs once daily. Controller. Please restart taking.    tobramycin-dexAMETHasone 0.3-0.1% (TOBRADEX) 0.3-0.1 % DrpS Place 1 drop into both eyes every 6 (six) hours while awake.    traMADoL (ULTRAM) 50 mg tablet Take 50 mg by mouth every 12 (twelve) hours as needed for Pain.    traZODone (DESYREL) 300 MG tablet Take 300 mg by mouth every evening.     Family History       Problem Relation (Age of Onset)    Cancer Mother (55), Father    Cirrhosis Paternal Aunt, Paternal Uncle    Colon cancer Maternal Uncle    Esophageal cancer Father    Heart disease Maternal Uncle    Liver disease Paternal Aunt, Paternal Uncle    No Known Problems Brother, Brother, Sister, Maternal Aunt, Maternal Grandfather, Paternal Grandmother, Paternal Grandfather    Parkinsonism Maternal Grandmother    Tremor Maternal Grandmother          Tobacco Use    Smoking status: Never    Smokeless tobacco: Never   Substance and Sexual Activity    Alcohol use: Not Currently    Drug use: Yes     Types: Marijuana     Comment: "medical marijuana gummies"    Sexual activity: Not on file     Review of Systems   Constitutional:  Positive for activity change, appetite change and chills.   HENT: Negative.     Eyes: Negative.    Respiratory: Negative.     Cardiovascular: " Negative.    Gastrointestinal:  Positive for nausea.   Endocrine: Negative.    Genitourinary:  Positive for dysuria.   Musculoskeletal: Negative.    Skin:  Positive for color change and rash.   Neurological:  Positive for headaches.   Psychiatric/Behavioral:  Positive for agitation.      Objective:     Vital Signs (Most Recent):  Temp: 98.3 °F (36.8 °C) (11/10/24 0437)  Pulse: 75 (11/10/24 0437)  Resp: 16 (11/10/24 0437)  BP: 115/71 (11/10/24 0437)  SpO2: (!) 88 % (11/10/24 0437) Vital Signs (24h Range):  Temp:  [97.7 °F (36.5 °C)-98.4 °F (36.9 °C)] 98.3 °F (36.8 °C)  Pulse:  [75-87] 75  Resp:  [16-20] 16  SpO2:  [88 %-99 %] 88 %  BP: (110-126)/(54-71) 115/71     Weight: 100.1 kg (220 lb 10.9 oz)  Body mass index is 34.56 kg/m².     Physical Exam  Constitutional:       Appearance: She is ill-appearing.   HENT:      Head: Normocephalic.      Nose: Nose normal.      Mouth/Throat:      Mouth: Mucous membranes are dry.      Pharynx: Oropharynx is clear.   Eyes:      Conjunctiva/sclera: Conjunctivae normal.      Pupils: Pupils are equal, round, and reactive to light.   Cardiovascular:      Rate and Rhythm: Normal rate.   Pulmonary:      Effort: Pulmonary effort is normal.   Abdominal:      General: Abdomen is flat. Bowel sounds are normal.      Palpations: Abdomen is soft.   Musculoskeletal:         General: Swelling present.      Cervical back: Normal range of motion.      Right lower leg: Edema present.      Left lower leg: Edema present.   Skin:     General: Skin is warm.      Capillary Refill: Capillary refill takes less than 2 seconds.      Findings: Rash present.   Neurological:      General: No focal deficit present.      Mental Status: She is alert and oriented to person, place, and time.   Psychiatric:         Mood and Affect: Mood normal.              CRANIAL NERVES     CN III, IV, VI   Pupils are equal, round, and reactive to light.       Significant Labs: All pertinent labs within the past 24 hours have been  "reviewed.  A1C: No results for input(s): "HGBA1C" in the last 4320 hours.  ABGs: No results for input(s): "PH", "PCO2", "HCO3", "POCSATURATED", "BE", "TOTALHB", "COHB", "METHB", "O2HB", "POCFIO2", "PO2" in the last 48 hours.  Bilirubin:   Recent Labs   Lab 10/17/24  1329 11/09/24 2016   BILITOT 0.6 0.6     Blood Culture: No results for input(s): "LABBLOO" in the last 48 hours.  BMP:   Recent Labs   Lab 11/09/24 2016   GLU 98      K 3.5      CO2 26   BUN 12   CREATININE 1.0   CALCIUM 9.7     CBC:   Recent Labs   Lab 11/09/24 2016   WBC 6.99   HGB 11.4*   HCT 36.2*        CMP:   Recent Labs   Lab 11/09/24 2016      K 3.5      CO2 26   GLU 98   BUN 12   CREATININE 1.0   CALCIUM 9.7   PROT 7.6   ALBUMIN 3.6   BILITOT 0.6   ALKPHOS 129   AST 10   ALT 8*   ANIONGAP 11     Cardiac Markers: No results for input(s): "CKMB", "MYOGLOBIN", "BNP", "TROPISTAT" in the last 48 hours.  Coagulation: No results for input(s): "PT", "INR", "APTT" in the last 48 hours.  Lactic Acid:   Recent Labs   Lab 11/09/24 2016   LACTATE 1.1       Significant Imaging: I have reviewed all pertinent imaging results/findings within the past 24 hours.  I have reviewed and interpreted all pertinent imaging results/findings within the past 24 hours.  Assessment/Plan:     * Cellulitis of left lower extremity    IP admit , empiric IV antibiotics   AM labs , follow up cultures. Pain control   Wrap legs, wound consult     Insomnia due to medical condition  Trazadone       Lymphedema of both lower extremities  AC Wraps       Chronic pain syndrome  Dilaudid pain pump        VTE Risk Mitigation (From admission, onward)           Ordered     enoxaparin injection 40 mg  Daily         11/09/24 2321     IP VTE HIGH RISK PATIENT  Once         11/09/24 2321     Place sequential compression device  Until discontinued         11/09/24 2321                                    Damien Kearney MD  Department of Hospital Medicine  [facility]" - Med Surg

## 2024-11-10 NOTE — CONSULTS
[facility] Infectious Diseases Consult Note    Primary Attending Physician: Claude Nava MD     Reason for Consult:     Lower extremity skin and skin structure infection    Assessment (see problem list below):     Tasha Hawley is a 69 y.o. female with:  Chronic lymphedema with lower extremity wounds.  Currently on doxycycline and ceftriaxone.  Patient does have a dog which raises the question of pasteurella and other canine oral organisms.  Suprapubic catheter with multiple positive urine cultures in the past.  Urine culture presently in process  Chronic pain syndrome with baclofen pump  CAD, hypertension, heart failure, hypothyroidism, emphysema, cervical fusion, spinal stimulator, chronic pain syndrome      Plan:     Can continue ceftriaxone for the moment.  This would cover MSSA and strep and pasteurella.  Could eventually be changed over to amoxicillin clavulanic acid  Level elevation of legs  Wound care  Monitor patient progress    Thank you for allowing us to participate in the care of this patient. Please contact me if you have any questions regarding this consult.    Gokul Hdzueroa  U ID  Pager: [phone]    Subjective:      History of Present Illness:  Tasha Hawley is a 69 y.o. female with CAD, hypertension, heart failure, hypothyroidism, emphysema, suprapubic catheter, Dilaudid pump, cervical spine fusion, spinal stimulator, chronic pain syndrome, lymphedema with chronic lower extremity wounds presents with fever, chills, left lower extremity redness, lower abdominal pain    Patient was been followed in Wound Care for lymphedema and left foot wound.  Patient was also followed by oxygen chronic care home visit.  Patient has suprapubic catheter placement.  Patient has had multiple rounds of antibiotics because of positive urine cultures as well as wound cultures.    08/30/2024 patient is seen in the emergency department with multiple issues including wounds, and lower extremity edema.   Patient was evaluated and discharged with follow up    09/10/2024 patient had urine cultures performed because of symptoms.  Urine culture positive for E coli.  Patient was given antibiotics by her primary    09/16/2024 patient was seen in [facility] Care Clinic for left plantar foot wound with pain.  Patient was noted to have chronic lymphedema.  Patient was treated topically.  Patient was also given fluconazole because of yeast infection by her primary    Patient noted that she was having problems with urination.  She has her suprapubic catheter changed on Friday by nursing at home.  Noted also the increasing swelling with pain in legs especially on the right side.  Patient having some chills as well.    11/09/2024 patient presented to emergency department with suprapubic abdominal pain.  She believed that she had a urinary tract infection.  Patient also noted increasing redness in both of her legs.  Upon evaluation, the patient was afebrile.  WBC 7.0.  Blood cultures were obtained.  No radiology was performed.  Patient was empirically started on doxycycline and ceftriaxone    Infectious disease was consulted for lower extremity skin and skin structure infection    Patient lives at home with  and dog.  Patient is close with a dog    Review of positive cultures:  09/10/2024 urine culture E coli  07/28/2024 wound culture left foot Pseudomonas aeruginosa  06/18/2024 urine culture E coli  05/08/2024 urine culture Candida  05/02/2024 urine culture stenotrophomonas  04/11/2024 urine culture Candida  03/10/2024 urine culture Candida  02/09/2024 urine culture E coli  12/19/23 urine culture E coli  09/30/2023 urine culture Pseudomonas aeruginosa  08/29/2023 urine culture Pseudomonas aeruginosa  03/10/2023 urine culture staph aureus MSSA  02/10/2023 urine culture MSSA  01/10/2023 urine culture MSSA  01/08/2023 urine culture coag-negative staph  01/06/2023 urine culture MSSA  12/02/2022 urine culture Enterococcus faecalis  MSSA  08/16/2022 urine culture MSSA  07/07/2022 urine culture ESBL E coli  05/12/2022 Urine culture ESBL E coli  04/15/2022 urine culture MSSA  02/03/2022 urine culture Candida  01/21/2022 urine culture Pseudomonas fluorescens  12/20/2021 urine culture MSSA  11/05/2021 urine culture Proteus mirabilis  10/25/2021 urine culture Proteus mirabilis  09/23/2021 urine culture Citrobacter  07/06/2021 urine culture MSSA  06/02/2021 urine culture Citrobacter  04/28/2021 urine culture Citrobacter  03/05/2021 urine culture Pseudomonas fluorescens  01/19/2020 on urine culture Enterococcus faecalis  12/22/2020 urine culture Enterococcus faecalis  10/19/2020 abdominal wound culture MSSA  10/08/2020 urine culture MSSA  09/16/2020 urine culture Candida  08/25/2020 urine culture staph aureus MSSA  05/05/2020 urine culture MRSA  03/24/2020 urine culture Candida  03/05/2020 urine culture Candida MSSA  12/02/2019 urine culture Proteus mirabilis  10/29/2019 urine culture Pseudomonas aeruginosa    Past Medical History:  Past Medical History:   Diagnosis Date    Anxiety     Arthritis     Bilateral lower extremity edema     severe chronic    Carotid artery occlusion     Cataract     CHF (congestive heart failure)     Coronary artery disease     subtotalled LAD with collateral    Depression     Fever blister     Hard of hearing     Hypokalemia 01/09/2023    Hyponatremia 02/04/2022    Hypothyroid     Iron deficiency anemia     Lumbar radiculopathy     with chronic pain    Ocular migraine     Other emphysema 05/22/2023    Renal disorder     Sleep apnea     cpap       Past Surgical History:  Past Surgical History:   Procedure Laterality Date    ADENOIDECTOMY      BACK SURGERY      x 12    CARDIAC CATHETERIZATION  2016    subtotalled LAD with right to left collaterals    CATARACT EXTRACTION W/  INTRAOCULAR LENS IMPLANT Left     Dr Coleman     CYSTOSCOPIC LITHOLAPAXY N/A 6/27/2019    Procedure: CYSTOLITHOLAPAXY;  Surgeon: Shireen Mayo MD;   Location: [facility] OR 2ND FLR;  Service: Urology;  Laterality: N/A;    CYSTOSCOPIC LITHOLAPAXY N/A 9/3/2019    Procedure: CYSTOLITHOLAPAXY;  Surgeon: Shireen Mayo MD;  Location: [facility] OR 2ND FLR;  Service: Urology;  Laterality: N/A;    CYSTOSCOPY N/A 7/13/2021    Procedure: CYSTOSCOPY;  Surgeon: Shireen Mayo MD;  Location: [facility] OR 1ST FLR;  Service: Urology;  Laterality: N/A;    CYSTOSCOPY  11/16/2021    Procedure: CYSTOSCOPY;  Surgeon: Shireen Mayo MD;  Location: [facility] OR 1ST FLR;  Service: Urology;;    CYSTOSCOPY  7/19/2022    Procedure: CYSTOSCOPY;  Surgeon: Shireen Mayo MD;  Location: [facility] OR 1ST FLR;  Service: Urology;;    CYSTOSCOPY WITH INJECTION OF PERIURETHRAL BULKING AGENT  7/19/2022    Procedure: CYSTOSCOPY, WITH PERIURETHRAL BULKING AGENT INJECTION-MACROPLASTIQUE;  Surgeon: Shireen Mayo MD;  Location: [facility] OR 1ST FLR;  Service: Urology;;    CYSTOSCOPY WITH INJECTION OF PERIURETHRAL BULKING AGENT N/A 3/28/2023    Procedure: CYSTOSCOPY, WITH PERIURETHRAL BULKING AGENT INJECTION;  Surgeon: Shireen Mayo MD;  [facility];  Service: Urology;  Laterality: N/A;  [place]    CYSTOSCOPY WITH INJECTION OF PERIURETHRAL BULKING AGENT N/A 11/14/2023    Procedure: CYSTOSCOPY, WITH PERIURETHRAL BULKING AGENT INJECTION;  Surgeon: Shireen Mayo MD;  Location: [facility] OR 1ST FLR;  Service: Urology;  Laterality: N/A;    CYSTOSCOPY,WITH BOTULINUM TOXIN INJECTION N/A 12/13/2022    Procedure: CYSTOSCOPY,WITH BOTULINUM TOXIN INJECTION;  Surgeon: Shireen Mayo MD;  Location: [facility] OR 1ST FLR;  Service: Urology;  Laterality: N/A;  300 U    CYSTOSCOPY,WITH BOTULINUM TOXIN INJECTION N/A 3/28/2023    Procedure: CYSTOSCOPY,WITH BOTULINUM TOXIN INJECTION;  Surgeon: Shireen Mayo MD;  Location: [facility] OR 1ST FLR;  Service: Urology;  Laterality: N/A;  45 MIN.    300 UNITS    ESOPHAGOGASTRODUODENOSCOPY N/A 5/23/2018    Procedure: ESOPHAGOGASTRODUODENOSCOPY (EGD);  Surgeon: Prince Vance,  MD;  Location: [facility] ENDO (4TH FLR);  Service: Endoscopy;  Laterality: N/A;  r/s 'd per Dr. Max due to family emergency- ER    ESOPHAGOGASTRODUODENOSCOPY N/A 6/23/2023    Procedure: EGD (ESOPHAGOGASTRODUODENOSCOPY);  Surgeon: Juaquin Barry MD;  Location: [facility] ENDO (2ND FLR);  Service: Endoscopy;  Laterality: N/A;  Refferal: ROSEANNE MAX  Order  tele enc 5/18/23  dx: history of a Nissen fundoplication  Additional Scheduling Information:  On home oxygen 2nd floor for airway protection also with a pain pump elevated BMI close to 40   Prep Gastroparesis   ins    ESOPHAGOGASTRODUODENOSCOPY N/A 8/12/2024    Procedure: EGD (ESOPHAGOGASTRODUODENOSCOPY);  Surgeon: Cat Cortés MD;  Location: [facility] ENDO (2ND FLR);  Service: Endoscopy;  Laterality: N/A;  referral dr max / prep inst mailed / gastropareisis/  8/5-called pt for pre call-unable to [facility]-portal message sent-tb-[facility] [employer]-intrathecal pain pump  8/7 precall complete -ml    HYSTERECTOMY  1975    endometriosis    INJECTION OF BOTULINUM TOXIN TYPE A  7/13/2021    Procedure: INJECTION, BOTULINUM TOXIN, 200units;  Surgeon: Shireen Mayo MD;  Location: [facility] OR 1ST FLR;  Service: Urology;;    INJECTION OF BOTULINUM TOXIN TYPE A  11/16/2021    Procedure: INJECTION, BOTULINUM TOXIN, 200units;  Surgeon: Shireen Mayo MD;  [facility]  Service: Urology;;    INJECTION OF BOTULINUM TOXIN TYPE A  7/19/2022    Procedure: INJECTION, BOTULINUM TOXIN, 300 units ;  Surgeon: Shireen Mayo MD;  Location: [facility] OR 1ST FLR;  Service: Urology;;    INJECTION OF BOTULINUM TOXIN TYPE A N/A 11/14/2023    Procedure: INJECTION, BOTULINUM TOXIN, TYPE A;  Surgeon: Shireen Mayo MD;  Location: [facility] OR 1ST FLR;  Service: Urology;  Laterality: N/A;    INSERTION, SUPRAPUBIC CATHETER N/A 12/13/2022    Procedure: INSERTION, SUPRAPUBIC CATHETER;  Surgeon: Shireen Mayo MD;  Location: [facility] OR [address] [place] [address];  Service: Urology;  Laterality: N/A;   exchange    INSERTION, SUPRAPUBIC CATHETER N/A 11/14/2023    Procedure: INSERTION, SUPRAPUBIC CATHETER;  Surgeon: Shireen Mayo MD;  [facility]  Service: Urology;  Laterality: N/A;  EXCHANGE of s/p tube    pain pump placement      SQ Dilaudid Pump managed by Dr. Hillman, Pain Management    REMOVAL OF BONE SPUR OF FOOT Bilateral 9/16/2022    Procedure: EXCISION ARTHRITIC BONE, BILATERAL FOOT;  Surgeon: NICOLA Mcgrath;  Location: [facility] OR;  Service: Podiatry;  Laterality: Bilateral;    REPLACEMENT OF BACLOFEN PUMP N/A 8/2/2023    Procedure: REPLACEMENT, BACLOFEN PUMP;  Surgeon: Smitha Condon MD;  Location: [facility] OR 2ND FLR;  Service: Neurosurgery;  Laterality: N/A;  Anes: Gen  Blood: Type & Screen  Pos: Left Lat  Intrathecal Pump  Bed: Reg Reversed  Rad: C-arm  Pump: 40 cc, [employer]    REPLACEMENT OF CATHETER N/A 10/31/2019    Procedure: REPLACEMENT, CATHETER-SUPRAPUBIC;  Surgeon: Shireen Mayo MD;  Location: [facility] OR [address] [place] [address];  Service: Urology;  Laterality: N/A;    SPINAL CORD STIMULATOR REMOVAL      before [place]    SPINE SURGERY  5-13-13    CERVICAL FUSION    TONSILLECTOMY         Allergies:  Review of patient's allergies indicates:   Allergen Reactions    (d)-limonene flavor      Other reaction(s): difficult intubation  Other reaction(s): Difficulty breathing    Benadryl [diphenhydramine hcl] Shortness Of Breath    Fentanyl Itching, Nausea And Vomiting and Swelling             Imitrex [sumatriptan succinate] Shortness Of Breath    Oxycodone-acetaminophen Shortness Of Breath    Sulfamethoxazole-trimethoprim Anaphylaxis    Topiramate Shortness Of Breath    Vancomycin Anaphylaxis     Rash    Butorphanol tartrate     Darvocet a500 [propoxyphene n-acetaminophen]      Other reaction(s): Difficulty breathing    Evening primrose (oenothera biennis)     Latex     Pregabalin Other (See Comments)     tremors    Sumatriptan     White petrolatum-zinc     Zinc acetate     Zinc oxide-white  "petrolatum      Other reaction(s): Difficulty breathing    Latex, natural rubber Itching and Rash    Phenytoin Rash and Other (See Comments)     Trouble breathing       Medications:  Reviewed  Antibiotics  Doxycycline  Ceftriaxone  Family History:  Family History   Problem Relation Name Age of Onset    Cancer Mother  55        breast    Cancer Father          esophagus,had laryngectomy    Esophageal cancer Father      Parkinsonism Maternal Grandmother      Tremor Maternal Grandmother      No Known Problems Brother      No Known Problems Brother      Heart disease Maternal Uncle klarissa     Colon cancer Maternal Uncle klarissa         Less than 60    No Known Problems Sister      No Known Problems Maternal Aunt      Cirrhosis Paternal Aunt          ETOH    Liver disease Paternal Aunt          ETOH    Liver disease Paternal Uncle          ETOH    Cirrhosis Paternal Uncle          ETOH    No Known Problems Maternal Grandfather      No Known Problems Paternal Grandmother      No Known Problems Paternal Grandfather      Melanoma Neg Hx      Amblyopia Neg Hx      Blindness Neg Hx      Cataracts Neg Hx      Diabetes Neg Hx      Glaucoma Neg Hx      Hypertension Neg Hx      Macular degeneration Neg Hx      Retinal detachment Neg Hx      Strabismus Neg Hx      Stroke Neg Hx      Thyroid disease Neg Hx      Celiac disease Neg Hx      Colon polyps Neg Hx      Cystic fibrosis Neg Hx      Crohn's disease Neg Hx      Inflammatory bowel disease Neg Hx      Liver cancer Neg Hx      Rectal cancer Neg Hx      Stomach cancer Neg Hx      Ulcerative colitis Neg Hx      Lymphoma Neg Hx         Social History:  Social History     Tobacco Use    Smoking status: Never    Smokeless tobacco: Never   Substance Use Topics    Alcohol use: Not Currently    Drug use: Yes     Types: Marijuana     Comment: "medical marijuana gummies"     Review of Systems   Cardiovascular:  Positive for leg swelling.   Musculoskeletal:         Leg pain right greater " "than left   All other systems reviewed and are negative.      Objective:   Last 24 Hour Vital Signs:  BP  Min: 110/56  Max: 128/65  Temp  Av.9 °F (36.6 °C)  Min: 97 °F (36.1 °C)  Max: 98.4 °F (36.9 °C)  Pulse  Av.5  Min: 70  Max: 87  Resp  Av.3  Min: 16  Max: 20  SpO2  Av.1 %  Min: 88 %  Max: 99 %  Height  Av' 7" (170.2 cm)  Min: 5' 7" (170.2 cm)  Max: 5' 7" (170.2 cm)  Weight  Av.4 kg (210 lb 5.4 oz)  Min: 90.7 kg (200 lb)  Max: 100.1 kg (220 lb 10.9 oz)  I/O last 3 completed shifts:  In: -   Out: 500 [Urine:500]    Physical Exam  Vitals and nursing note reviewed.   HENT:      Head: Normocephalic and atraumatic.      Comments: Right facial droop     Mouth/Throat:      Mouth: Mucous membranes are moist.      Pharynx: Oropharynx is clear. No oropharyngeal exudate.   Eyes:      Conjunctiva/sclera: Conjunctivae normal.      Pupils: Pupils are equal, round, and reactive to light.   Cardiovascular:      Rate and Rhythm: Normal rate and regular rhythm.      Heart sounds: No murmur heard.     No friction rub. No gallop.      Comments: Decreased pedal pulses  Pulmonary:      Comments: Decreased breath sounds at the bases  Abdominal:      Comments: Bowel sounds present.  Nontender abdomen Dilaudid pump in place in the right flank.  Nontender around device.  Suprapubic catheter in place without evidence of drainage at the insertion site.   Genitourinary:     Comments: Suprapubic catheter in place as above  Musculoskeletal:         General: Tenderness present.      Cervical back: Neck supple.      Right lower leg: Edema present.      Left lower leg: Edema present.      Comments: Upper extremities without lesions.  Evidence of arthritis in the hands with some ulnar deviation bilaterally.  Lower extremity with mild erythema.  Edema in both legs about the same.  Right leg more tenderness in the left.  Sparing the feet below the ankles.  No signs of infection in the feet   Lymphadenopathy:      " Cervical: No cervical adenopathy.   Skin:     Findings: Erythema and lesion present.   Neurological:      Mental Status: She is alert and oriented to person, place, and time. Mental status is at baseline.      Comments: Right facial droop is old.  Symmetrically weak but sensation intact.         Devices:  Peripheral IV  Suprapubic catheter  Infusion pump  Laboratory Results: reviewed  Most Recent Data:  CBC:   Lab Results   Component Value Date    WBC 6.99 11/09/2024    HGB 11.4 (L) 11/09/2024    HCT 36.2 (L) 11/09/2024     11/09/2024    MCV 86 11/09/2024    RDW 15.3 (H) 11/09/2024     BMP:   Lab Results   Component Value Date     11/09/2024    K 3.5 11/09/2024     11/09/2024    CO2 26 11/09/2024    BUN 12 11/09/2024    GLU 98 11/09/2024    CALCIUM 9.7 11/09/2024    MG 2.1 05/27/2024    PHOS 3.2 01/17/2023     LFTs:   Lab Results   Component Value Date    PROT 7.6 11/09/2024    ALBUMIN 3.6 11/09/2024    BILITOT 0.6 11/09/2024    AST 10 11/09/2024    ALKPHOS 129 11/09/2024    ALT 8 (L) 11/09/2024    GGT 51 04/30/2015     Urinalysis:   Lab Results   Component Value Date    LABURIN (A) 09/10/2024     ESCHERICHIA COLI  > 100,000 cfu/ml  No other significant isolate      COLORU Yellow 11/09/2024    SPECGRAV 1.015 11/09/2024    NITRITE Positive (A) 11/09/2024    KETONESU Negative 11/09/2024    UROBILINOGEN Negative 11/09/2024       Trended Lab Data:  Recent Labs   Lab 11/09/24 2016   WBC 6.99   HGB 11.4*   HCT 36.2*      MCV 86   RDW 15.3*      K 3.5      CO2 26   BUN 12   GLU 98   PROT 7.6   ALBUMIN 3.6   BILITOT 0.6   AST 10   ALKPHOS 129   ALT 8*       Microbiology Data:  Blood cultures in process    Other Results:    Radiology:  Reviewed  No recent imaging    Problem List  Patient Active Problem List   Diagnosis    CECI (obstructive sleep apnea)    Iron deficiency anemia    Major depressive disorder, recurrent, mild    Chronic pain syndrome    Cervical myelopathy    Narcotic  dependency, continuous    Thoracic aorta atherosclerosis    GERD (gastroesophageal reflux disease)    Neurogenic bladder    Frequent falls    Lymphedema of both lower extremities    Scoliosis deformity of spine    S/P insertion of intrathecal pump    Paresthesia of both lower extremities    Degenerative disc disease, lumbar    Osteoarthritis of spine with radiculopathy, lumbar region    Oropharyngeal dysphagia    Bilateral carotid artery disease    Insomnia due to medical condition    Suprapubic catheter    Esophageal dysphagia    Mixed stress and urge urinary incontinence    Pure hypercholesterolemia    Chronic diastolic heart failure    Constipation due to opioid therapy    Leg pain, bilateral    Physical deconditioning    Tremor    Anxiety    Calcaneal spur of left foot    Charcot ankle, unspecified laterality    Normocytic anemia    Preoperative respiratory examination    BMI 34.0-34.9,adult    Intractable pain    Pulmonary hypertension due to diastolic systemic ventricular dysfunction    Pulmonary air trapping    Migraine    Hypothyroid    History of staph infection    Chronic, continuous use of opioids    Coronary artery disease involving native coronary artery of native heart with angina pectoris    Fracture, phalanx, foot    Idiopathic hypotension    Chronic venous hypertension (idiopathic) with ulcer and inflammation of bilateral lower extremity    Adrenal insufficiency    Insomnia    Cellulitis of left lower extremity    Renal impairment    Hyponatremia    Chronic ulcer of left foot with fat layer exposed     See above for impression and recommendations.

## 2024-11-10 NOTE — ED PROVIDER NOTES
==== ASSUMPTION OF CARE NOTE ====    Care of patient assumed by self, from Dr. Sanket Castro, as of shift change.      UA consistent with infection; ceftriaxone administered.    Concern for cellulitic change of the left lower extremity on my exam; per patient and significant other bedside it appears to be worsening redness tenderness    In light of the aforementioned will admit to the [facility] Medicine service for further evaluation and management.    Pt comfortable with plan for admission at this time.         Tushar Muniz MD  11/09/24 2622

## 2024-11-10 NOTE — PLAN OF CARE
Resting in bed call light within reach bed in low position, ace dressing in place to bilateral lower extremities clean dry and intact.

## 2024-11-11 PROBLEM — T83.510A URINARY TRACT INFECTION ASSOCIATED WITH CYSTOSTOMY CATHETER: Status: ACTIVE | Noted: 2021-06-03

## 2024-11-11 PROBLEM — T83.511A URINARY TRACT INFECTION ASSOCIATED WITH INDWELLING URETHRAL CATHETER: Status: ACTIVE | Noted: 2021-06-03

## 2024-11-11 PROCEDURE — 25000003 PHARM REV CODE 250: Performed by: INTERNAL MEDICINE

## 2024-11-11 PROCEDURE — 99900035 HC TECH TIME PER 15 MIN (STAT)

## 2024-11-11 PROCEDURE — 93010 ELECTROCARDIOGRAM REPORT: CPT | Mod: ,,, | Performed by: INTERNAL MEDICINE

## 2024-11-11 PROCEDURE — 63600175 PHARM REV CODE 636 W HCPCS: Performed by: INTERNAL MEDICINE

## 2024-11-11 PROCEDURE — 93005 ELECTROCARDIOGRAM TRACING: CPT

## 2024-11-11 PROCEDURE — 25000003 PHARM REV CODE 250: Performed by: STUDENT IN AN ORGANIZED HEALTH CARE EDUCATION/TRAINING PROGRAM

## 2024-11-11 PROCEDURE — 11000001 HC ACUTE MED/SURG PRIVATE ROOM

## 2024-11-11 PROCEDURE — 94760 N-INVAS EAR/PLS OXIMETRY 1: CPT

## 2024-11-11 PROCEDURE — 27000221 HC OXYGEN, UP TO 24 HOURS

## 2024-11-11 RX ORDER — ALUMINUM HYDROXIDE, MAGNESIUM HYDROXIDE, AND SIMETHICONE 1200; 120; 1200 MG/30ML; MG/30ML; MG/30ML
30 SUSPENSION ORAL EVERY 6 HOURS PRN
Status: DISCONTINUED | OUTPATIENT
Start: 2024-11-11 | End: 2024-11-14 | Stop reason: HOSPADM

## 2024-11-11 RX ADMIN — MUPIROCIN: 20 OINTMENT TOPICAL at 08:11

## 2024-11-11 RX ADMIN — QUETIAPINE FUMARATE 100 MG: 100 TABLET ORAL at 08:11

## 2024-11-11 RX ADMIN — HYDROMORPHONE HYDROCHLORIDE 2 MG: 2 TABLET ORAL at 01:11

## 2024-11-11 RX ADMIN — ALUMINUM HYDROXIDE, MAGNESIUM HYDROXIDE, AND SIMETHICONE 30 ML: 1200; 120; 1200 SUSPENSION ORAL at 08:11

## 2024-11-11 RX ADMIN — QUETIAPINE FUMARATE 25 MG: 25 TABLET ORAL at 08:11

## 2024-11-11 RX ADMIN — CEFTRIAXONE SODIUM 1 G: 1 INJECTION, POWDER, FOR SOLUTION INTRAMUSCULAR; INTRAVENOUS at 11:11

## 2024-11-11 RX ADMIN — TRAZODONE HYDROCHLORIDE 100 MG: 100 TABLET ORAL at 08:11

## 2024-11-11 RX ADMIN — HYDROMORPHONE HYDROCHLORIDE 2 MG: 2 TABLET ORAL at 08:11

## 2024-11-11 RX ADMIN — LEVOTHYROXINE SODIUM 150 MCG: 25 TABLET ORAL at 05:11

## 2024-11-11 RX ADMIN — PANTOPRAZOLE SODIUM 40 MG: 40 TABLET, DELAYED RELEASE ORAL at 08:11

## 2024-11-11 RX ADMIN — HYDROCODONE BITARTRATE AND ACETAMINOPHEN 1 TABLET: 10; 325 TABLET ORAL at 08:11

## 2024-11-11 RX ADMIN — HYDROMORPHONE HYDROCHLORIDE 2 MG: 2 TABLET ORAL at 05:11

## 2024-11-11 RX ADMIN — ENOXAPARIN SODIUM 40 MG: 40 INJECTION SUBCUTANEOUS at 04:11

## 2024-11-11 RX ADMIN — AMITRIPTYLINE HYDROCHLORIDE 25 MG: 25 TABLET, FILM COATED ORAL at 08:11

## 2024-11-11 RX ADMIN — HYDROCODONE BITARTRATE AND ACETAMINOPHEN 1 TABLET: 10; 325 TABLET ORAL at 06:11

## 2024-11-11 NOTE — CONSULTS
"[facility] Surg  Wound Care    Patient Name:  Tasha Hawley   MRN:  [id number]  Date: 11/11/2024  Diagnosis: Cellulitis of left lower extremity    History:     Past Medical History:   Diagnosis Date    Anxiety     Arthritis     Bilateral lower extremity edema     severe chronic    Carotid artery occlusion     Cataract     CHF (congestive heart failure)     Coronary artery disease     subtotalled LAD with collateral    Depression     Fever blister     Hard of hearing     Hypokalemia 01/09/2023    Hyponatremia 02/04/2022    Hypothyroid     Iron deficiency anemia     Lumbar radiculopathy     with chronic pain    Ocular migraine     Other emphysema 05/22/2023    Renal disorder     Sleep apnea     cpap       Social History     Socioeconomic History    Marital status:    Tobacco Use    Smoking status: Never    Smokeless tobacco: Never   Substance and Sexual Activity    Alcohol use: Not Currently    Drug use: Yes     Types: Marijuana     Comment: "medical marijuana gummies"     Social Drivers of Health     Financial Resource Strain: Low Risk  (11/10/2024)    Overall Financial Resource Strain (CARDIA)     Difficulty of Paying Living Expenses: Not hard at all   Food Insecurity: No Food Insecurity (11/10/2024)    Hunger Vital Sign     Worried About Running Out of Food in the Last Year: Never true     Ran Out of Food in the Last Year: Never true   Transportation Needs: Unmet Transportation Needs (11/10/2024)    TRANSPORTATION NEEDS     Transportation : Yes, it has kept me from medical appointments or from getting my medications.   Physical Activity: Inactive (6/14/2024)    Exercise Vital Sign     Days of Exercise per Week: 0 days     Minutes of Exercise per Session: 0 min   Stress: No Stress Concern Present (11/10/2024)    [employer] - Occupational Stress Questionnaire     Feeling of Stress : Not at all   Housing Stability: Low Risk  (11/10/2024)    Housing Stability Vital Sign     " Unable to Pay for Housing in the Last Year: No     Homeless in the Last Year: No       Precautions:     Allergies as of 11/09/2024 - Reviewed 11/09/2024   Allergen Reaction Noted    (d)-limonene flavor  09/05/2012    Benadryl [diphenhydramine hcl] Shortness Of Breath 06/08/2018    Fentanyl Itching, Nausea And Vomiting, and Swelling 09/05/2012    Imitrex [sumatriptan succinate] Shortness Of Breath 01/03/2013    Oxycodone-acetaminophen Shortness Of Breath 04/22/2015    Sulfamethoxazole-trimethoprim Anaphylaxis 09/05/2012    Topiramate Shortness Of Breath 01/03/2013    Vancomycin Anaphylaxis 09/05/2012    Butorphanol tartrate  10/25/2016    Darvocet a500 [propoxyphene n-acetaminophen]  09/05/2012    Evening primrose (oenothera biennis)  09/25/2022    Latex  11/08/2023    Pregabalin Other (See Comments) 04/03/2023    Sumatriptan  11/08/2023    White petrolatum-zinc  01/23/2017    Zinc acetate  11/08/2023    Zinc oxide-white petrolatum  09/05/2012    Latex, natural rubber Itching and Rash 02/19/2013    Phenytoin Rash and Other (See Comments) 10/02/2018       [facility] Assessment Details/Treatment       BLE- scattered partial thickness venous ulcerations with edema and erythema- cellulitis- scant serosanguinous drainage       Recommendations discussed with pt, nurse, Dr. Govea, and Dr. Chery:  - Pressure injury prevention interventions- heel boots   - Xeroform dressing.abd pad, cast padding and lite ACE daily to BLE- elevation for edema control    11/11/2024

## 2024-11-11 NOTE — SUBJECTIVE & OBJECTIVE
Interval History: Virtual follow up visit for suspected Cellulitis of left lower extremity [L03.116]  Chest pain [R07.9]  Lymphedema of both lower extremities [I89.0]  Urinary tract infection associated with indwelling urethral catheter, initial encounter [T83.511A, N39.0] present on admission.   This service was provided by telemedicine.    The patient location is: [facility] A   Admitted 11/9/2024  6:28 PM    CC: LE cellulitis    The patient is able to provide adequate history. History was obtained from patient and past medical records.   No significant events overnight reported.  Patient complains of LE pain in addition to chronic back pain. BP low this AM. Patient complained of chest pain this AM      Data  Details     [x]   Lab results reviewed 11/11/2024  H&H at baseline. Lytes stable. sCr normal. UA consistent with UTI    []   Micro reports reviewed 11/11/2024     []   Pathology reports reviewed 11/11/2024     []   Imaging reports reviewed 11/11/2024     []   Cardiology Procedure reports reviewed 11/11/2024     []   Non- records/CareEverywhere notes reviewed 11/11/2024      []  Tests/studies orders placed or verified 11/11/2024       [x]  Independently viewed/assessed 11/11/2024  EKG 11/11 AM: NSR, low voltage    [x]  11/11/2024 Discussion of:  Wound care and wrapping with [facility]     Review of Systems   Constitutional:  Negative for fever.   Respiratory:  Negative for shortness of breath.    Musculoskeletal:  Positive for back pain and gait problem.     Objective:     Vital Signs (Most Recent):  Temp: 97.6 °F (36.4 °C) (11/11/24 1121)  Pulse: (!) 57 (11/11/24 1121)  Resp: 18 (11/11/24 1121)  BP: (!) 87/49 (11/11/24 1121)  SpO2: 99 % (11/11/24 1121) Vital Signs (24h Range):  Temp:  [97.6 °F (36.4 °C)-98.6 °F (37 °C)] 97.6 °F (36.4 °C)  Pulse:  [55-80] 57  Resp:  [16-20] 18  SpO2:  [87 %-99 %] 99 %  BP: ()/(49-64) 87/49     Weight: 100.1 kg (220 lb 10.9 oz)  Body mass index is 34.56 kg/m².    Intake/Output  Summary (Last 24 hours) at 11/11/2024 1237  Last data filed at 11/11/2024 0523  Gross per 24 hour   Intake --   Output 650 ml   Net -650 ml         Physical Exam  Constitutional:       General: She is awake.      Appearance: She is ill-appearing.   Cardiovascular:      Comments: Monitor and/or Vital signs reviewed at time of visit  Pulmonary:      Effort: Pulmonary effort is normal. No accessory muscle usage or respiratory distress.   Musculoskeletal:      Right lower leg: Tenderness present. Edema present.      Left lower leg: Tenderness present. Edema present.   Neurological:      Mental Status: She is alert. She is not disoriented.   Psychiatric:         Attention and Perception: Attention normal.         Behavior: Behavior is cooperative.         Significant Labs:   Recent Labs   Lab 11/09/24 2016   WBC 6.99   HGB 11.4*   HCT 36.2*        Recent Labs   Lab 11/09/24 2016   GRAN 71.1  5.0   LYMPH 18.6  1.3   MONO 6.7  0.5   EOS 0.2     Recent Labs   Lab 11/09/24 2016      K 3.5      CO2 26   BUN 12   CREATININE 1.0   GLU 98   CALCIUM 9.7   ALBUMIN 3.6     Recent Labs   Lab 11/09/24 2016   ALKPHOS 129   ALT 8*   AST 10   PROT 7.6   BILITOT 0.6     Recent Labs   Lab 11/09/24 2016   LACTATE 1.1     Microbiology Results (last 7 days)       Procedure Component Value Units Date/Time    Blood Culture #2 **CANNOT BE ORDERED STAT** [[id number]] Collected: 11/09/24 2010    Order Status: Completed Specimen: Blood from Peripheral, Antecubital, Left Updated: 11/11/24 0613     Blood Culture, Routine No Growth to date      No Growth to date    Blood Culture #1 **CANNOT BE ORDERED STAT** [[id number]] Collected: 11/09/24 1945    Order Status: Completed Specimen: Blood from Peripheral, Antecubital, Right Updated: 11/11/24 0613     Blood Culture, Routine No Growth to date      No Growth to date          SARS-CoV2 (COVID-19) Qualitative PCR (no units)   Date Value   04/29/2023 Not Detected   12/12/2020 Not  Detected   06/22/2020 Not Detected   06/10/2020 Not Detected   04/15/2020 Not Detected     SARS-CoV-2 RNA, Amplification, Qual (no units)   Date Value   02/18/2024 Negative   03/23/2023 Negative   01/17/2023 Negative   07/22/2022 Negative   05/12/2020 Negative     POC Rapid COVID (no units)   Date Value   09/09/2023 Negative   08/08/2023 Negative   04/28/2023 Negative   02/03/2022 Negative   06/02/2021 Negative   01/13/2021 Negative     Results for orders placed during the hospital encounter of 05/08/24    Echo    Interpretation Summary    Left Ventricle: The left ventricle is normal in size. Normal wall thickness. There is normal systolic function. Biplane (2D) method of discs ejection fraction is 69%. Grade I diastolic dysfunction.    Right Ventricle: Normal right ventricular cavity size. Systolic function is normal. TAPSE is 3.66 cm.    Normal left atrial size. The left atrium volume index is 28.1 mL/m2.    Normal right atrial size.    The aortic valve is structurally normal. There is normal leaflet mobility.    The mitral valve is structurally normal. There is normal leaflet mobility.    The tricuspid valve is structurally normal. There is normal leaflet mobility.      X-Ray Chest AP Portable  Narrative: EXAMINATION:  XR CHEST AP PORTABLE    CLINICAL HISTORY:  CHF;    TECHNIQUE:  Single frontal view of the chest was performed.    COMPARISON:  07/18/2024    FINDINGS:  Heart mediastinal contours appear stable.  Hypoventilatory changes with prominence of the interstitium again noted left greater than right.  Air for structure behind the heart on the right suggests hiatal hernia.  Dental implants and spinal cord stimulator noted.  Impression: Hypoventilatory changes with increased densities in the left lung base.  Correlate for left lung base atelectasis versus residual infiltrate.  Not significantly changed since 07/18/2024 allowing for degree of inspiratory phase.    Electronically signed by: Josh  Satya  Date:    08/30/2024  Time:    22:43      Labs and Imaging listed above were reviewed.

## 2024-11-11 NOTE — PLAN OF CARE
The sw sent the pt's info to her current hh agency [facility] to notify of a hospital admit.        11/11/24 2267   Post-Acute Status   Post-Acute Authorization Home Health   Home Health Status Referrals Sent   Discharge Plan   Discharge Plan A Home Health   Discharge Plan B Other  (TBD)

## 2024-11-11 NOTE — PLAN OF CARE
Problem: Adult Inpatient Plan of Care  Goal: Plan of Care Review  Outcome: Progressing  Goal: Absence of Hospital-Acquired Illness or Injury  Outcome: Progressing  Goal: Readiness for Transition of Care  Outcome: Progressing     Problem: Pain Chronic (Persistent)  Goal: Optimal Pain Control and Function  Outcome: Progressing     Problem: UTI (Urinary Tract Infection)  Goal: Improved Infection Symptoms  Outcome: Progressing     Problem: Fall Injury Risk  Goal: Absence of Fall and Fall-Related Injury  Outcome: Progressing     Problem: Skin Injury Risk Increased  Goal: Skin Health and Integrity  Outcome: Progressing     Problem: Wound  Goal: Skin Health and Integrity  Outcome: Progressing

## 2024-11-11 NOTE — CONSULTS
[facility] Medicine  Telemedicine Consult Note    Patient Name: Tasha Hawley  MRN: [id number]  Admission Date: 11/9/2024  Hospital Length of Stay: 1 days  Attending Physician: Claude Nava MD   Primary Care Provider: Mesfin Hodges II, MD         Thank you for your consult to [facility]. We have reviewed the patient chart. This patient does meet criteria for [facility] service at this time.  Will assume care on 11/11/24 at 7AM.          Rebecca Chery MD  Department of Hospital Medicine   [facility]

## 2024-11-11 NOTE — PLAN OF CARE
Patient received on    2Lpm NC with SpO2    %. Pt with no apparent distress noted. Will continue to monitor.

## 2024-11-11 NOTE — PLAN OF CARE
The sw met with the pt to complete the assessment. The pt lives with Dangelo Hawley(spouse)[phone] in [place]. The pt's current with [facility] and wants to resume with them. The pt refuses post acute placement stating she declined it at her last admit also. The pt states she went to a snf in the past and only stayed 1 day,she refuses to ever go again. The pt's spouse and hh nurse assists her with her ADL'S as needed and she has the dme listed below. The sw completed the white board in the pt's room with her name and contact info. The sw gave the pt a d/c brochure with her contact info on it. The sw encouraged her to call if she has any further questions or concerns. The sw will continue to follow the pt throughout her transitions of care and will assist with any d/c needs. The pt was d/c'd from [facility] 10/16/24 due to not needing any further assistance from them. The pt doesn't drive so her spouse and friends transport her to dr holder's and [facility]. The pt's spouse or friend Fermin will transport her home at d/c.     [facility] Surg  Initial Discharge Assessment       Primary Care Provider: Mesfin Hodges II, MD    Admission Diagnosis: Cellulitis of left lower extremity [L03.116]  Chest pain [R07.9]  Lymphedema of both lower extremities [I89.0]  Urinary tract infection associated with indwelling urethral catheter, initial encounter [T83.511A, N39.0]    Admission Date: 11/9/2024  Expected Discharge Date: 11/12/2024    Transition of Care Barriers: (P) None    Payor: [employer] MANAGED MEDICARE / Plan: [employer] HMO PPO SPECIAL NEEDS / Product Type: Medicare Advantage /     Extended Emergency Contact Information  Primary Emergency Contact: DonnaLisa   [place]  Mobile Phone: [phone]  Relation: Daughter  Secondary Emergency Contact: Jaycee Hawley  Mobile Phone: [phone]  Relation: Daughter    Discharge Plan A: (P) Home Health  Discharge Plan B: (P)  Other (TBD)      [facility] [place] - [address]  [address]  [place] [address]  Phone: [phone] Fax: [phone]    [facility] [place]  [address]  [place] [address]  Phone: [phone] Fax: [phone]      Initial Assessment (most recent)       Adult Discharge Assessment - 11/11/24 1511          Discharge Assessment    Assessment Type Discharge Planning Assessment (P)      Confirmed/corrected address, phone number and insurance Yes (P)      Confirmed Demographics Correct on Facesheet (P)      Source of Information patient (P)      When was your last doctors appointment? 10/23/24 (P)      Communicated JACKIE with patient/caregiver Yes (P)      Reason For Admission Cellulitis of left lower extremity (P)      People in Home spouse (P)      Do you expect to return to your current living situation? Yes (P)      Do you have help at home or someone to help you manage your care at home? Yes (P)      Who are your caregiver(s) and their phone number(s)? Dangelo Hawley(spouse)[phone] (P)      Prior to hospitilization cognitive status: Alert/Oriented (P)      Current cognitive status: Alert/Oriented (P)      Walking or Climbing Stairs Difficulty yes (P)      Walking or Climbing Stairs ambulation difficulty, requires equipment;stair climbing difficulty, requires equipment;transferring difficulty, requires equipment (P)      Dressing/Bathing Difficulty yes (P)      Dressing/Bathing bathing difficulty, requires equipment (P)      Home Accessibility wheelchair accessible (P)      Home Layout Able to live on 1st floor (P)      Equipment Currently Used at Home wheelchair;rollator;walker, rolling;oxygen;CPAP;bedside commode;shower chair (P)    wr chair is broken    Readmission within 30 days? No (P)      Patient currently being followed by outpatient case management? No (P)      Do you currently have service(s) that help you manage your  care at home? -- (P)    pt's current with [facility]    Do you take prescription medications? Yes (P)      Do you have prescription coverage? Yes (P)      Coverage [employer] [facility] [facility] (P)      Do you have any problems affording any of your prescribed medications? No (P)      Is the patient taking medications as prescribed? yes (P)      Who is going to help you get home at discharge? Dangelo Hawley(spouse)[phone] or her friend Fermin (P)      How do you get to doctors appointments? family or friend will provide (P)      Are you on dialysis? No (P)      Do you take coumadin? No (P)      Discharge Plan A Home Health (P)      Discharge Plan B Other (P)    TBD    DME Needed Upon Discharge  other (see comments) (P)    TBD    Discharge Plan discussed with: Patient (P)      Transition of Care Barriers None (P)         Physical Activity    On average, how many days per week do you engage in moderate to strenuous exercise (like a brisk walk)? 0 days (P)      On average, how many minutes do you engage in exercise at this level? 0 min (P)         Financial Resource Strain    How hard is it for you to pay for the very basics like food, housing, medical care, and heating? Very hard (P)         Housing Stability    In the last 12 months, was there a time when you were not able to pay the mortgage or rent on time? No (P)      At any time in the past 12 months, were you homeless or living in a shelter (including now)? No (P)         Transportation Needs    Has the lack of transportation kept you from medical appointments, meetings, work or from getting things needed for daily living? No (P)         Food Insecurity    Within the past 12 months, you worried that your food would run out before you got the money to buy more. Never true (P)      Within the past 12 months, the food you bought just didn't last and you didn't have money to get more. Never true (P)         Stress    Do you feel stress - tense,  restless, nervous, or anxious, or unable to sleep at night because your mind is troubled all the time - these days? Very much (P)         Social Isolation    How often do you feel lonely or isolated from those around you?  Rarely (P)         Alcohol Use    Q1: How often do you have a drink containing alcohol? Never (P)      Q2: How many drinks containing alcohol do you have on a typical day when you are drinking? Patient does not drink (P)      Q3: How often do you have six or more drinks on one occasion? Never (P)         Utilities    In the past 12 months has the electric, gas, oil, or water company threatened to shut off services in your home? No (P)         Health Literacy    How often do you need to have someone help you when you read instructions, pamphlets, or other written material from your doctor or pharmacy? Rarely (P)         OTHER    Name(s) of People in Home Dangelo Hawley(spouse)[phone] (P)

## 2024-11-11 NOTE — PLAN OF CARE
Resting in bed call light within reach bed in low position bilateral leg wounds open to air elevated with Z flex boots.

## 2024-11-11 NOTE — NURSING
"During rounds Pt. Complained that she is still having indigestion even after PRN this AM. Bedside nurse asked Pt. To describe the pain. PT. Stated, "its right in the middle of my chest and it just feels like theres a "block" there."  RN asked Pt. If there is pain when she takes deep breathes and Pt. Replied yes. Pt. VSS. Bedside nurse called to have PRN EKG completed. Dr. Chery notified of Pt. Complaints and PRN EKG order called in. MD acknowledged. No new orders at this time.   "

## 2024-11-11 NOTE — PROGRESS NOTES
[facility] ID note    Patient afebrile.  On doxycycline and ceftriaxone.    Patient had lower extremity wounds wrapped by Wound Care.    Could potentially be changed to doxycycline and cephalexin oral regimen for short course for cellulitis  Wound care and edema control    Monitor at distance    Please call if any questions   Gokul Govea  [facility] ID  [phone]

## 2024-11-12 ENCOUNTER — DOCUMENT SCAN (OUTPATIENT)
Dept: HOME HEALTH SERVICES | Facility: HOSPITAL | Age: 69
End: 2024-11-12
Payer: MEDICARE

## 2024-11-12 LAB
ALBUMIN SERPL BCP-MCNC: 2.6 G/DL (ref 3.5–5.2)
ANION GAP SERPL CALC-SCNC: 8 MMOL/L (ref 8–16)
BASOPHILS # BLD AUTO: 0.02 K/UL (ref 0–0.2)
BASOPHILS NFR BLD: 0.5 % (ref 0–1.9)
BUN SERPL-MCNC: 7 MG/DL (ref 8–23)
CALCIUM SERPL-MCNC: 8.7 MG/DL (ref 8.7–10.5)
CHLORIDE SERPL-SCNC: 107 MMOL/L (ref 95–110)
CO2 SERPL-SCNC: 27 MMOL/L (ref 23–29)
CREAT SERPL-MCNC: 0.8 MG/DL (ref 0.5–1.4)
DIFFERENTIAL METHOD BLD: ABNORMAL
EOSINOPHIL # BLD AUTO: 0.3 K/UL (ref 0–0.5)
EOSINOPHIL NFR BLD: 6.4 % (ref 0–8)
ERYTHROCYTE [DISTWIDTH] IN BLOOD BY AUTOMATED COUNT: 15.3 % (ref 11.5–14.5)
EST. GFR  (NO RACE VARIABLE): >60 ML/MIN/1.73 M^2
GLUCOSE SERPL-MCNC: 92 MG/DL (ref 70–110)
HCT VFR BLD AUTO: 33.4 % (ref 37–48.5)
HGB BLD-MCNC: 10.3 G/DL (ref 12–16)
IMM GRANULOCYTES # BLD AUTO: 0.03 K/UL (ref 0–0.04)
IMM GRANULOCYTES NFR BLD AUTO: 0.7 % (ref 0–0.5)
LYMPHOCYTES # BLD AUTO: 1.8 K/UL (ref 1–4.8)
LYMPHOCYTES NFR BLD: 40.2 % (ref 18–48)
MAGNESIUM SERPL-MCNC: 1.9 MG/DL (ref 1.6–2.6)
MCH RBC QN AUTO: 26.5 PG (ref 27–31)
MCHC RBC AUTO-ENTMCNC: 30.8 G/DL (ref 32–36)
MCV RBC AUTO: 86 FL (ref 82–98)
MONOCYTES # BLD AUTO: 0.4 K/UL (ref 0.3–1)
MONOCYTES NFR BLD: 9 % (ref 4–15)
NEUTROPHILS # BLD AUTO: 1.9 K/UL (ref 1.8–7.7)
NEUTROPHILS NFR BLD: 43.2 % (ref 38–73)
NRBC BLD-RTO: 0 /100 WBC
PHOSPHATE SERPL-MCNC: 3.6 MG/DL (ref 2.7–4.5)
PLATELET # BLD AUTO: 227 K/UL (ref 150–450)
PMV BLD AUTO: 9.9 FL (ref 9.2–12.9)
POTASSIUM SERPL-SCNC: 4 MMOL/L (ref 3.5–5.1)
RBC # BLD AUTO: 3.88 M/UL (ref 4–5.4)
SODIUM SERPL-SCNC: 142 MMOL/L (ref 136–145)
WBC # BLD AUTO: 4.35 K/UL (ref 3.9–12.7)

## 2024-11-12 PROCEDURE — 63600175 PHARM REV CODE 636 W HCPCS: Performed by: INTERNAL MEDICINE

## 2024-11-12 PROCEDURE — 25000003 PHARM REV CODE 250: Performed by: INTERNAL MEDICINE

## 2024-11-12 PROCEDURE — 11000001 HC ACUTE MED/SURG PRIVATE ROOM

## 2024-11-12 PROCEDURE — 94760 N-INVAS EAR/PLS OXIMETRY 1: CPT

## 2024-11-12 PROCEDURE — 27000221 HC OXYGEN, UP TO 24 HOURS

## 2024-11-12 PROCEDURE — 83735 ASSAY OF MAGNESIUM: CPT | Performed by: INTERNAL MEDICINE

## 2024-11-12 PROCEDURE — 36415 COLL VENOUS BLD VENIPUNCTURE: CPT | Performed by: INTERNAL MEDICINE

## 2024-11-12 PROCEDURE — 85025 COMPLETE CBC W/AUTO DIFF WBC: CPT | Performed by: INTERNAL MEDICINE

## 2024-11-12 PROCEDURE — 80069 RENAL FUNCTION PANEL: CPT | Performed by: INTERNAL MEDICINE

## 2024-11-12 PROCEDURE — 25000003 PHARM REV CODE 250: Performed by: STUDENT IN AN ORGANIZED HEALTH CARE EDUCATION/TRAINING PROGRAM

## 2024-11-12 PROCEDURE — 94761 N-INVAS EAR/PLS OXIMETRY MLT: CPT

## 2024-11-12 PROCEDURE — 99900035 HC TECH TIME PER 15 MIN (STAT)

## 2024-11-12 RX ORDER — MICONAZOLE NITRATE 2 G/100G
POWDER TOPICAL 2 TIMES DAILY
Status: DISCONTINUED | OUTPATIENT
Start: 2024-11-12 | End: 2024-11-14 | Stop reason: HOSPADM

## 2024-11-12 RX ORDER — ATORVASTATIN CALCIUM 40 MG/1
80 TABLET, FILM COATED ORAL NIGHTLY
Status: DISCONTINUED | OUTPATIENT
Start: 2024-11-12 | End: 2024-11-14 | Stop reason: HOSPADM

## 2024-11-12 RX ORDER — BUTALBITAL, ASPIRIN, AND CAFFEINE 325; 50; 40 MG/1; MG/1; MG/1
1 CAPSULE ORAL EVERY 6 HOURS PRN
Status: DISCONTINUED | OUTPATIENT
Start: 2024-11-12 | End: 2024-11-12

## 2024-11-12 RX ORDER — FUROSEMIDE 40 MG/1
40 TABLET ORAL ONCE
Status: COMPLETED | OUTPATIENT
Start: 2024-11-12 | End: 2024-11-12

## 2024-11-12 RX ORDER — HYDROCORTISONE 5 MG/1
5 TABLET ORAL
Status: DISCONTINUED | OUTPATIENT
Start: 2024-11-12 | End: 2024-11-14 | Stop reason: HOSPADM

## 2024-11-12 RX ORDER — ALBUTEROL SULFATE 90 UG/1
2 INHALANT RESPIRATORY (INHALATION) EVERY 6 HOURS PRN
Status: DISCONTINUED | OUTPATIENT
Start: 2024-11-12 | End: 2024-11-14 | Stop reason: HOSPADM

## 2024-11-12 RX ORDER — DOXYCYCLINE HYCLATE 100 MG
100 TABLET ORAL EVERY 12 HOURS
Status: DISCONTINUED | OUTPATIENT
Start: 2024-11-12 | End: 2024-11-14 | Stop reason: HOSPADM

## 2024-11-12 RX ORDER — METHOCARBAMOL 750 MG/1
750 TABLET, FILM COATED ORAL 2 TIMES DAILY
COMMUNITY
Start: 2024-10-29

## 2024-11-12 RX ORDER — SENNOSIDES 8.6 MG/1
2 TABLET ORAL 2 TIMES DAILY
Status: DISCONTINUED | OUTPATIENT
Start: 2024-11-12 | End: 2024-11-14 | Stop reason: HOSPADM

## 2024-11-12 RX ORDER — HYDROCORTISONE 5 MG/1
10 TABLET ORAL DAILY
Status: DISCONTINUED | OUTPATIENT
Start: 2024-11-13 | End: 2024-11-14 | Stop reason: HOSPADM

## 2024-11-12 RX ORDER — BUTALBITAL, ACETAMINOPHEN AND CAFFEINE 50; 325; 40 MG/1; MG/1; MG/1
1 TABLET ORAL DAILY PRN
COMMUNITY
Start: 2024-10-28

## 2024-11-12 RX ORDER — BUTALBITAL, ACETAMINOPHEN AND CAFFEINE 50; 325; 40 MG/1; MG/1; MG/1
1 TABLET ORAL EVERY 6 HOURS PRN
Status: DISCONTINUED | OUTPATIENT
Start: 2024-11-12 | End: 2024-11-14 | Stop reason: HOSPADM

## 2024-11-12 RX ORDER — METHOCARBAMOL 750 MG/1
750 TABLET, FILM COATED ORAL 2 TIMES DAILY
Status: DISCONTINUED | OUTPATIENT
Start: 2024-11-12 | End: 2024-11-14 | Stop reason: HOSPADM

## 2024-11-12 RX ADMIN — HYDROCORTISONE 5 MG: 5 TABLET ORAL at 04:11

## 2024-11-12 RX ADMIN — CEFTRIAXONE SODIUM 1 G: 1 INJECTION, POWDER, FOR SOLUTION INTRAMUSCULAR; INTRAVENOUS at 11:11

## 2024-11-12 RX ADMIN — QUETIAPINE FUMARATE 25 MG: 25 TABLET ORAL at 08:11

## 2024-11-12 RX ADMIN — HYDROCODONE BITARTRATE AND ACETAMINOPHEN 1 TABLET: 10; 325 TABLET ORAL at 08:11

## 2024-11-12 RX ADMIN — METHOCARBAMOL TABLETS 750 MG: 750 TABLET, COATED ORAL at 09:11

## 2024-11-12 RX ADMIN — MUPIROCIN: 20 OINTMENT TOPICAL at 08:11

## 2024-11-12 RX ADMIN — MUPIROCIN: 20 OINTMENT TOPICAL at 09:11

## 2024-11-12 RX ADMIN — HYDROCODONE BITARTRATE AND ACETAMINOPHEN 1 TABLET: 10; 325 TABLET ORAL at 09:11

## 2024-11-12 RX ADMIN — QUETIAPINE FUMARATE 100 MG: 100 TABLET ORAL at 09:11

## 2024-11-12 RX ADMIN — ENOXAPARIN SODIUM 40 MG: 40 INJECTION SUBCUTANEOUS at 04:11

## 2024-11-12 RX ADMIN — HYDROMORPHONE HYDROCHLORIDE 2 MG: 2 TABLET ORAL at 05:11

## 2024-11-12 RX ADMIN — DOXYCYCLINE HYCLATE 100 MG: 100 TABLET, COATED ORAL at 09:11

## 2024-11-12 RX ADMIN — ATORVASTATIN CALCIUM 80 MG: 40 TABLET, FILM COATED ORAL at 09:11

## 2024-11-12 RX ADMIN — MICONAZOLE NITRATE: 20 POWDER TOPICAL at 05:11

## 2024-11-12 RX ADMIN — BUTALBITAL, ACETAMINOPHEN, AND CAFFEINE 1 TABLET: 325; 50; 40 TABLET ORAL at 02:11

## 2024-11-12 RX ADMIN — LEVOTHYROXINE SODIUM 150 MCG: 25 TABLET ORAL at 06:11

## 2024-11-12 RX ADMIN — FUROSEMIDE 40 MG: 40 TABLET ORAL at 04:11

## 2024-11-12 RX ADMIN — TRAZODONE HYDROCHLORIDE 100 MG: 100 TABLET ORAL at 09:11

## 2024-11-12 RX ADMIN — AMITRIPTYLINE HYDROCHLORIDE 25 MG: 25 TABLET, FILM COATED ORAL at 09:11

## 2024-11-12 RX ADMIN — HYDROMORPHONE HYDROCHLORIDE 2 MG: 2 TABLET ORAL at 11:11

## 2024-11-12 RX ADMIN — PANTOPRAZOLE SODIUM 40 MG: 40 TABLET, DELAYED RELEASE ORAL at 08:11

## 2024-11-12 RX ADMIN — DOXYCYCLINE HYCLATE 100 MG: 100 TABLET, COATED ORAL at 01:11

## 2024-11-12 RX ADMIN — SENNOSIDES 2 TABLET: 8.6 TABLET, FILM COATED ORAL at 09:11

## 2024-11-12 NOTE — ASSESSMENT & PLAN NOTE
IP admit , empiric IV antibiotics   Pain control   Wrap legs, wound care consult   ID consulted  Antibiotics (From admission, onward)      Start     Stop Route Frequency Ordered    11/10/24 2300  cefTRIAXone injection 1 g         -- IV Every 24 hours (non-standard times) 11/09/24 2325    11/10/24 0900  mupirocin 2 % ointment         11/15/24 0859 Nasl 2 times daily 11/09/24 2324

## 2024-11-12 NOTE — PROGRESS NOTES
[facility] ID note    Patient afebrile.  WBC 4.3.  On doxycycline ceftriaxone.    Wound care with compression  Discontinue antibiotics after tomorrow    We will sign off    Please call if any questions   Gokul Govea  [facility] ID  [phone]

## 2024-11-12 NOTE — ASSESSMENT & PLAN NOTE
Recurrent  Antibiotics (From admission, onward)      Start     Stop Route Frequency Ordered    11/10/24 2300  cefTRIAXone injection 1 g         -- IV Every 24 hours (non-standard times) 11/09/24 2325    11/10/24 0900  mupirocin 2 % ointment         11/15/24 0859 Nasl 2 times daily 11/09/24 2150

## 2024-11-12 NOTE — PLAN OF CARE
Pt. AAOx4. PRN administered for pain. Fungal powder ordered for rash and interdry used.  Z-flex boots in place. Family at bedside. Parameters for BP placed as nursing communication in relation to Pt. MAP prior to pain medication administration. Bed alarm on and call light in reach. Pt. Instructed to call for assistance. Plan of care continued.          Problem: Adult Inpatient Plan of Care  Goal: Plan of Care Review  Outcome: Progressing     Problem: Adult Inpatient Plan of Care  Goal: Patient-Specific Goal (Individualized)  Outcome: Progressing     Problem: Adult Inpatient Plan of Care  Goal: Absence of Hospital-Acquired Illness or Injury  Outcome: Progressing     Problem: Adult Inpatient Plan of Care  Goal: Optimal Comfort and Wellbeing  Outcome: Progressing     Problem: Adult Inpatient Plan of Care  Goal: Readiness for Transition of Care  Outcome: Progressing

## 2024-11-12 NOTE — PROGRESS NOTES
[facility]  Telemedicine Progress Note    Patient Name: Tasha Hawley  MRN: [id number]  Patient Class: IP- Inpatient   Admission Date: 11/9/2024  Length of Stay: 2 days  Attending Physician: Rebecca Chery MD  Primary Care Provider: Mesfin Hodges II, MD      Subjective:     Principal Problem:Cellulitis of left lower extremity    HPI:    Very pleasant 68 y/o F , obesity , chronic pain syndrome s/p dilaudid pain pump, CAD, HTN, presents with chills, subjective fevers , LLE swelling redness, tender, non purulent , 8/10 actue pain over past 2 days   Dysuria , sx of UTI   No sick contacts or travel     Overview/Hospital Course:  No notes on file    Interval History: Virtual follow up visit for suspected Cellulitis of left lower extremity [L03.116]  Chest pain [R07.9]  Lymphedema of both lower extremities [I89.0]  Urinary tract infection associated with indwelling urethral catheter, initial encounter [T83.511A, N39.0] present on admission.   This service was provided by telemedicine.    The patient location is: [facility]/26 A   Admitted 11/9/2024  6:28 PM    CC: LE cellulitis    The patient is able to provide adequate history. History was obtained from patient and past medical records.   No significant events overnight reported.  Patient complains of LE pain in addition to chronic back pain. BP low this AM. Patient complained of chest pain this AM      Data  Details     [x]   Lab results reviewed 11/11/2024  H&H at baseline. Lytes stable. sCr normal. UA consistent with UTI    []   Micro reports reviewed 11/11/2024     []   Pathology reports reviewed 11/11/2024     []   Imaging reports reviewed 11/11/2024     []   Cardiology Procedure reports reviewed 11/11/2024     []   Non- records/CareEverywhere notes reviewed 11/11/2024      []  Tests/studies orders placed or verified 11/11/2024       [x]  Independently viewed/assessed 11/11/2024  EKG 11/11 AM: NSR, low voltage    [x]  11/11/2024  Discussion of:  Wound care and wrapping with WCON     Review of Systems   Constitutional:  Negative for fever.   Respiratory:  Negative for shortness of breath.    Musculoskeletal:  Positive for back pain and gait problem.     Objective:     Vital Signs (Most Recent):  Temp: 97.6 °F (36.4 °C) (11/11/24 1121)  Pulse: (!) 57 (11/11/24 1121)  Resp: 18 (11/11/24 1121)  BP: (!) 87/49 (11/11/24 1121)  SpO2: 99 % (11/11/24 1121) Vital Signs (24h Range):  Temp:  [97.6 °F (36.4 °C)-98.6 °F (37 °C)] 97.6 °F (36.4 °C)  Pulse:  [55-80] 57  Resp:  [16-20] 18  SpO2:  [87 %-99 %] 99 %  BP: ()/(49-64) 87/49     Weight: 100.1 kg (220 lb 10.9 oz)  Body mass index is 34.56 kg/m².    Intake/Output Summary (Last 24 hours) at 11/11/2024 1237  Last data filed at 11/11/2024 0523  Gross per 24 hour   Intake --   Output 650 ml   Net -650 ml         Physical Exam  Constitutional:       General: She is awake.      Appearance: She is ill-appearing.   Cardiovascular:      Comments: Monitor and/or Vital signs reviewed at time of visit  Pulmonary:      Effort: Pulmonary effort is normal. No accessory muscle usage or respiratory distress.   Musculoskeletal:      Right lower leg: Tenderness present. Edema present.      Left lower leg: Tenderness present. Edema present.   Neurological:      Mental Status: She is alert. She is not disoriented.   Psychiatric:         Attention and Perception: Attention normal.         Behavior: Behavior is cooperative.         Significant Labs:   Recent Labs   Lab 11/09/24 2016   WBC 6.99   HGB 11.4*   HCT 36.2*        Recent Labs   Lab 11/09/24 2016   GRAN 71.1  5.0   LYMPH 18.6  1.3   MONO 6.7  0.5   EOS 0.2     Recent Labs   Lab 11/09/24 2016      K 3.5      CO2 26   BUN 12   CREATININE 1.0   GLU 98   CALCIUM 9.7   ALBUMIN 3.6     Recent Labs   Lab 11/09/24 2016   ALKPHOS 129   ALT 8*   AST 10   PROT 7.6   BILITOT 0.6     Recent Labs   Lab 11/09/24 2016   LACTATE 1.1     Microbiology  Results (last 7 days)       Procedure Component Value Units Date/Time    Blood Culture #2 **CANNOT BE ORDERED STAT** [[id number]] Collected: 11/09/24 2010    Order Status: Completed Specimen: Blood from Peripheral, Antecubital, Left Updated: 11/11/24 0613     Blood Culture, Routine No Growth to date      No Growth to date    Blood Culture #1 **CANNOT BE ORDERED STAT** [[id number]] Collected: 11/09/24 1945    Order Status: Completed Specimen: Blood from Peripheral, Antecubital, Right Updated: 11/11/24 0613     Blood Culture, Routine No Growth to date      No Growth to date          SARS-CoV2 (COVID-19) Qualitative PCR (no units)   Date Value   04/29/2023 Not Detected   12/12/2020 Not Detected   06/22/2020 Not Detected   06/10/2020 Not Detected   04/15/2020 Not Detected     SARS-CoV-2 RNA, Amplification, Qual (no units)   Date Value   02/18/2024 Negative   03/23/2023 Negative   01/17/2023 Negative   07/22/2022 Negative   05/12/2020 Negative     POC Rapid COVID (no units)   Date Value   09/09/2023 Negative   08/08/2023 Negative   04/28/2023 Negative   02/03/2022 Negative   06/02/2021 Negative   01/13/2021 Negative     Results for orders placed during the hospital encounter of 05/08/24    Echo    Interpretation Summary    Left Ventricle: The left ventricle is normal in size. Normal wall thickness. There is normal systolic function. Biplane (2D) method of discs ejection fraction is 69%. Grade I diastolic dysfunction.    Right Ventricle: Normal right ventricular cavity size. Systolic function is normal. TAPSE is 3.66 cm.    Normal left atrial size. The left atrium volume index is 28.1 mL/m2.    Normal right atrial size.    The aortic valve is structurally normal. There is normal leaflet mobility.    The mitral valve is structurally normal. There is normal leaflet mobility.    The tricuspid valve is structurally normal. There is normal leaflet mobility.      X-Ray Chest AP Portable  Narrative: EXAMINATION:  XR CHEST AP  PORTABLE    CLINICAL HISTORY:  CHF;    TECHNIQUE:  Single frontal view of the chest was performed.    COMPARISON:  07/18/2024    FINDINGS:  Heart mediastinal contours appear stable.  Hypoventilatory changes with prominence of the interstitium again noted left greater than right.  Air for structure behind the heart on the right suggests hiatal hernia.  Dental implants and spinal cord stimulator noted.  Impression: Hypoventilatory changes with increased densities in the left lung base.  Correlate for left lung base atelectasis versus residual infiltrate.  Not significantly changed since 07/18/2024 allowing for degree of inspiratory phase.    Electronically signed by: Josh Hernadez  Date:    08/30/2024  Time:    22:43      Labs and Imaging listed above were reviewed.     Assessment/Plan:      * Cellulitis of left lower extremity  IP admit , empiric IV antibiotics   Pain control   Wrap legs, wound care consult   ID consulted  Antibiotics (From admission, onward)      Start     Stop Route Frequency Ordered    11/10/24 2300  cefTRIAXone injection 1 g         -- IV Every 24 hours (non-standard times) 11/09/24 2325    11/10/24 0900  mupirocin 2 % ointment         11/15/24 0859 Nasl 2 times daily 11/09/24 2321            Urinary tract infection associated with cystostomy catheter  Recurrent  Antibiotics (From admission, onward)      Start     Stop Route Frequency Ordered    11/10/24 2300  cefTRIAXone injection 1 g         -- IV Every 24 hours (non-standard times) 11/09/24 2325    11/10/24 0900  mupirocin 2 % ointment         11/15/24 0859 Nasl 2 times daily 11/09/24 2321              Chronic diastolic heart failure  Patient is identified as having Diastolic (HFpEF) heart failure that is Chronic. CHF is currently controlled.   Latest ECHO performed and demonstrates  - Results for orders placed during the hospital encounter of 05/08/24  Echo  Interpretation Summary    Left Ventricle: The left ventricle is normal in size.  Normal wall thickness. There is normal systolic function. Biplane (2D) method of discs ejection fraction is 69%. Grade I diastolic dysfunction.    Right Ventricle: Normal right ventricular cavity size. Systolic function is normal. TAPSE is 3.66 cm.    Normal left atrial size. The left atrium volume index is 28.1 mL/m2.    Normal right atrial size.    The aortic valve is structurally normal. There is normal leaflet mobility.    The mitral valve is structurally normal. There is normal leaflet mobility.    The tricuspid valve is structurally normal. There is normal leaflet mobility.    Esophageal dysphagia  Likely partial source of atypical chest pain    Insomnia due to medical condition  Trazadone continued    Lymphedema of both lower extremities  AC Wraps as tolerated  Difficult at this time due to pain from cellulitis    Neurogenic bladder  S/p SPC    Chronic pain syndrome  Dilaudid intrathecal pump      Social Drivers of Health with Concerns     Financial Resource Strain: High Risk (11/11/2024)    Overall Financial Resource Strain (CARDIA)     Difficulty of Paying Living Expenses: Very hard   Transportation Needs: No Transportation Needs (11/11/2024)    TRANSPORTATION NEEDS     Transportation : No   Recent Concern: Transportation Needs - Unmet Transportation Needs (11/10/2024)    TRANSPORTATION NEEDS     Transportation : Yes, it has kept me from medical appointments or from getting my medications.   Physical Activity: Inactive (11/11/2024)    Exercise Vital Sign     Days of Exercise per Week: 0 days     Minutes of Exercise per Session: 0 min   Stress: Stress Concern Present (11/11/2024)    [employer] - Occupational Stress Questionnaire     Feeling of Stress : Very much   Health Literacy: Adequate Health Literacy (11/11/2024)     Health Literacy     Frequency of need for help with medical instructions: Rarely   Recent Concern: Health Literacy - Inadequate Health Literacy  (11/10/2024)     Health Literacy     Frequency of need for help with medical instructions: Sometimes       Active Hospital Problems    Diagnosis  POA    *Cellulitis of left lower extremity [L03.116]  Yes    Urinary tract infection associated with cystostomy catheter [T83.510A, N39.0]  Yes    Chronic diastolic heart failure [I50.32]  Yes    Esophageal dysphagia [R13.19]  Yes    Insomnia due to medical condition [G47.01]  Yes     Chronic    Lymphedema of both lower extremities [I89.0]  Yes    Neurogenic bladder [N31.9]  Yes     Chronic    Chronic pain syndrome [G89.4]  Yes     Chronic      Resolved Hospital Problems   No resolved problems to display.       Inpatient Medications Prescribed for Management of Current Problems:     Scheduled Meds:   amitriptyline  25 mg Oral QHS    cefTRIAXone (Rocephin) IV (PEDS and ADULTS)  1 g Intravenous Q24H    enoxparin  40 mg Subcutaneous Daily    levothyroxine  150 mcg Oral Before breakfast    mupirocin   Nasal BID    pantoprazole  40 mg Oral Daily    QUEtiapine  100 mg Oral Nightly    QUEtiapine  25 mg Oral Daily    traZODone  100 mg Oral QHS     Continuous Infusions:  PRN Meds:.  Current Facility-Administered Medications:     aluminum-magnesium hydroxide-simethicone, 30 mL, Oral, Q6H PRN    dextrose 10%, 12.5 g, Intravenous, PRN    dextrose 10%, 25 g, Intravenous, PRN    glucagon (human recombinant), 1 mg, Intramuscular, PRN    glucose, 16 g, Oral, PRN    glucose, 24 g, Oral, PRN    HYDROcodone-acetaminophen, 1 tablet, Oral, Q8H PRN    HYDROmorphone, 2 mg, Oral, Q6H PRN    naloxone, 0.02 mg, Intravenous, PRN    ondansetron, 4 mg, Intravenous, Q8H PRN    sodium chloride 0.9%, 10 mL, Intravenous, Q12H PRN    VTE Risk Mitigation (From admission, onward)           Ordered     enoxaparin injection 40 mg  Daily         11/09/24 2321     IP VTE HIGH RISK PATIENT  Once         11/09/24 2321     Place sequential compression device  Until discontinued         11/09/24 2321                   I have completed this tele-visit without the assistance of a telepresenter.    The attending portion of this evaluation, treatment, and documentation was performed per Rebecca Chery MD via Telemedicine AudioVisual using the secure 360[employer] software platform with 2 way audio/video. The provider was located off-site and the patient is located in the hospital. The aforementioned video software was utilized to document the relevant history and physical exam    I spent a total of 54 minutes on the day of the visit.This includes face to face time and non-face to face time preparing to see the patient (eg, review of tests), obtaining and/or reviewing separately obtained history, documenting clinical information in the electronic or other health record, independently interpreting results and communicating results to the patient/family/caregiver, or care coordinator.      Rebecca Chery MD  Department of Hospital Medicine   [facility]

## 2024-11-12 NOTE — SUBJECTIVE & OBJECTIVE
Interval History: Virtual follow up visit for suspected Cellulitis of left lower extremity [L03.116]  Chest pain [R07.9]  Lymphedema of both lower extremities [I89.0]  Urinary tract infection associated with indwelling urethral catheter, initial encounter [T83.511A, N39.0] present on admission.   This service was provided by telemedicine.    The patient location is: [address]   Admitted 11/9/2024  6:28 PM    CC: LE cellulitis    The patient is able to provide adequate history. History was obtained from patient and past medical records.   No significant events overnight reported. and Nurse today reports rash under breasts  Patient complains of LE pain and HAs.       Data  Details     [x]   Lab results reviewed 11/12/2024  H&H at baseline. Lytes stable. sCr normal.     []   Micro reports reviewed 11/12/2024     []   Pathology reports reviewed 11/12/2024     []   Imaging reports reviewed 11/12/2024     []   Cardiology Procedure reports reviewed 11/12/2024     []   Non- records/CareEverywhere notes reviewed 11/12/2024      []  Tests/studies orders placed or verified 11/12/2024       []  Independently viewed/assessed 11/12/2024      [x]  11/12/2024 Discussion of:  Antibiotics with Pharmacist     Review of Systems   Constitutional:  Negative for fever.   Respiratory:  Negative for shortness of breath.    Musculoskeletal:  Positive for back pain and gait problem.   Neurological:  Positive for headaches.     Objective:     Vital Signs (Most Recent):  Temp: 97.9 °F (36.6 °C) (11/12/24 1204)  Pulse: (!) 59 (11/12/24 1204)  Resp: 18 (11/12/24 1204)  BP: 117/66 (11/12/24 1204)  SpO2: 95 % (11/12/24 1204) Vital Signs (24h Range):  Temp:  [97.6 °F (36.4 °C)-98.8 °F (37.1 °C)] 97.9 °F (36.6 °C)  Pulse:  [53-64] 59  Resp:  [16-20] 18  SpO2:  [93 %-98 %] 95 %  BP: ()/(51-74) 117/66     Weight: 100.1 kg (220 lb 10.9 oz)  Body mass index is 34.56 kg/m².    Intake/Output Summary (Last 24 hours) at 11/12/2024 1408  Last data  filed at 11/12/2024 1230  Gross per 24 hour   Intake 720 ml   Output 1000 ml   Net -280 ml         Physical Exam  Constitutional:       General: She is awake.      Appearance: She is ill-appearing.   Cardiovascular:      Comments: Monitor and/or Vital signs reviewed at time of visit  Pulmonary:      Effort: Pulmonary effort is normal. No accessory muscle usage or respiratory distress.   Musculoskeletal:      Right lower leg: Tenderness present. Edema present.      Left lower leg: Tenderness present. Edema present.   Neurological:      Mental Status: She is alert. She is not disoriented.   Psychiatric:         Attention and Perception: Attention normal.         Behavior: Behavior is cooperative.         Significant Labs:   Recent Labs   Lab 11/09/24 2016 11/12/24 0451   WBC 6.99 4.35   HGB 11.4* 10.3*   HCT 36.2* 33.4*    227     Recent Labs   Lab 11/09/24 2016 11/12/24 0451   GRAN 71.1  5.0 43.2  1.9   LYMPH 18.6  1.3 40.2  1.8   MONO 6.7  0.5 9.0  0.4   EOS 0.2 0.3     Recent Labs   Lab 11/09/24 2016 11/12/24 0451    142   K 3.5 4.0    107   CO2 26 27   BUN 12 7*   CREATININE 1.0 0.8   GLU 98 92   CALCIUM 9.7 8.7   ALBUMIN 3.6 2.6*   MG  --  1.9   PHOS  --  3.6     Recent Labs   Lab 11/09/24 2016   ALKPHOS 129   ALT 8*   AST 10   PROT 7.6   BILITOT 0.6     Recent Labs   Lab 11/09/24 2016   LACTATE 1.1     Microbiology Results (last 7 days)       Procedure Component Value Units Date/Time    Blood Culture #2 **CANNOT BE ORDERED STAT** [[id number]] Collected: 11/09/24 2010    Order Status: Completed Specimen: Blood from Peripheral, Antecubital, Left Updated: 11/12/24 0613     Blood Culture, Routine No Growth to date      No Growth to date      No Growth to date    Blood Culture #1 **CANNOT BE ORDERED STAT** [[id number]] Collected: 11/09/24 1945    Order Status: Completed Specimen: Blood from Peripheral, Antecubital, Right Updated: 11/12/24 0613     Blood Culture, Routine No Growth to  date      No Growth to date      No Growth to date          SARS-CoV2 (COVID-19) Qualitative PCR (no units)   Date Value   04/29/2023 Not Detected   12/12/2020 Not Detected   06/22/2020 Not Detected   06/10/2020 Not Detected   04/15/2020 Not Detected     SARS-CoV-2 RNA, Amplification, Qual (no units)   Date Value   02/18/2024 Negative   03/23/2023 Negative   01/17/2023 Negative   07/22/2022 Negative   05/12/2020 Negative     POC Rapid COVID (no units)   Date Value   09/09/2023 Negative   08/08/2023 Negative   04/28/2023 Negative   02/03/2022 Negative   06/02/2021 Negative   01/13/2021 Negative     Results for orders placed during the hospital encounter of 05/08/24    Echo    Interpretation Summary    Left Ventricle: The left ventricle is normal in size. Normal wall thickness. There is normal systolic function. Biplane (2D) method of discs ejection fraction is 69%. Grade I diastolic dysfunction.    Right Ventricle: Normal right ventricular cavity size. Systolic function is normal. TAPSE is 3.66 cm.    Normal left atrial size. The left atrium volume index is 28.1 mL/m2.    Normal right atrial size.    The aortic valve is structurally normal. There is normal leaflet mobility.    The mitral valve is structurally normal. There is normal leaflet mobility.    The tricuspid valve is structurally normal. There is normal leaflet mobility.      X-Ray Chest AP Portable  Narrative: EXAMINATION:  XR CHEST AP PORTABLE    CLINICAL HISTORY:  CHF;    TECHNIQUE:  Single frontal view of the chest was performed.    COMPARISON:  07/18/2024    FINDINGS:  Heart mediastinal contours appear stable.  Hypoventilatory changes with prominence of the interstitium again noted left greater than right.  Air for structure behind the heart on the right suggests hiatal hernia.  Dental implants and spinal cord stimulator noted.  Impression: Hypoventilatory changes with increased densities in the left lung base.  Correlate for left lung base atelectasis  versus residual infiltrate.  Not significantly changed since 07/18/2024 allowing for degree of inspiratory phase.    Electronically signed by: Josh Hernadez  Date:    08/30/2024  Time:    22:43      Labs and Imaging listed above were reviewed.

## 2024-11-12 NOTE — ASSESSMENT & PLAN NOTE
Patient is identified as having Diastolic (HFpEF) heart failure that is Chronic. CHF is currently controlled.   Latest ECHO performed and demonstrates  - Results for orders placed during the hospital encounter of 05/08/24  Echo  Interpretation Summary    Left Ventricle: The left ventricle is normal in size. Normal wall thickness. There is normal systolic function. Biplane (2D) method of discs ejection fraction is 69%. Grade I diastolic dysfunction.    Right Ventricle: Normal right ventricular cavity size. Systolic function is normal. TAPSE is 3.66 cm.    Normal left atrial size. The left atrium volume index is 28.1 mL/m2.    Normal right atrial size.    The aortic valve is structurally normal. There is normal leaflet mobility.    The mitral valve is structurally normal. There is normal leaflet mobility.    The tricuspid valve is structurally normal. There is normal leaflet mobility.

## 2024-11-12 NOTE — PLAN OF CARE
Pt. AAOx4. PRN administered for pain. WC at bedside during shift. PRN EKG completed during shift. Z-flex boots in place. Family at bedside. Parameters for BP placed as nursing communication in relation to Pt. MAP prior to pain medication administration. Bed alarm on and call light in reach. Pt. Instructed to call for assistance. Plan of care continued.    Problem: Adult Inpatient Plan of Care  Goal: Plan of Care Review  Outcome: Progressing     Problem: Adult Inpatient Plan of Care  Goal: Patient-Specific Goal (Individualized)  Outcome: Progressing     Problem: Adult Inpatient Plan of Care  Goal: Absence of Hospital-Acquired Illness or Injury  Outcome: Progressing     Problem: Adult Inpatient Plan of Care  Goal: Optimal Comfort and Wellbeing  Outcome: Progressing     Problem: Adult Inpatient Plan of Care  Goal: Readiness for Transition of Care  Outcome: Progressing

## 2024-11-13 LAB
OHS QRS DURATION: 108 MS
OHS QTC CALCULATION: 446 MS

## 2024-11-13 PROCEDURE — 97530 THERAPEUTIC ACTIVITIES: CPT

## 2024-11-13 PROCEDURE — 27000221 HC OXYGEN, UP TO 24 HOURS

## 2024-11-13 PROCEDURE — 11000001 HC ACUTE MED/SURG PRIVATE ROOM

## 2024-11-13 PROCEDURE — 97162 PT EVAL MOD COMPLEX 30 MIN: CPT

## 2024-11-13 PROCEDURE — 25000003 PHARM REV CODE 250: Performed by: EMERGENCY MEDICINE

## 2024-11-13 PROCEDURE — 63600175 PHARM REV CODE 636 W HCPCS: Performed by: INTERNAL MEDICINE

## 2024-11-13 PROCEDURE — 99900035 HC TECH TIME PER 15 MIN (STAT)

## 2024-11-13 PROCEDURE — 25000003 PHARM REV CODE 250: Performed by: STUDENT IN AN ORGANIZED HEALTH CARE EDUCATION/TRAINING PROGRAM

## 2024-11-13 PROCEDURE — 25000003 PHARM REV CODE 250: Performed by: INTERNAL MEDICINE

## 2024-11-13 PROCEDURE — 97165 OT EVAL LOW COMPLEX 30 MIN: CPT

## 2024-11-13 PROCEDURE — 94761 N-INVAS EAR/PLS OXIMETRY MLT: CPT

## 2024-11-13 RX ORDER — OXYBUTYNIN CHLORIDE 5 MG/1
5 TABLET ORAL 3 TIMES DAILY
Status: DISCONTINUED | OUTPATIENT
Start: 2024-11-13 | End: 2024-11-14 | Stop reason: HOSPADM

## 2024-11-13 RX ADMIN — CEFTRIAXONE SODIUM 1 G: 1 INJECTION, POWDER, FOR SOLUTION INTRAMUSCULAR; INTRAVENOUS at 11:11

## 2024-11-13 RX ADMIN — HYDROCODONE BITARTRATE AND ACETAMINOPHEN 1 TABLET: 10; 325 TABLET ORAL at 02:11

## 2024-11-13 RX ADMIN — HYDROMORPHONE HYDROCHLORIDE 2 MG: 2 TABLET ORAL at 10:11

## 2024-11-13 RX ADMIN — HYDROCORTISONE 10 MG: 5 TABLET ORAL at 11:11

## 2024-11-13 RX ADMIN — MICONAZOLE NITRATE: 20 POWDER TOPICAL at 09:11

## 2024-11-13 RX ADMIN — QUETIAPINE FUMARATE 100 MG: 100 TABLET ORAL at 09:11

## 2024-11-13 RX ADMIN — LEVOTHYROXINE SODIUM 150 MCG: 25 TABLET ORAL at 05:11

## 2024-11-13 RX ADMIN — SENNOSIDES 2 TABLET: 8.6 TABLET, FILM COATED ORAL at 09:11

## 2024-11-13 RX ADMIN — QUETIAPINE FUMARATE 25 MG: 25 TABLET ORAL at 10:11

## 2024-11-13 RX ADMIN — PANTOPRAZOLE SODIUM 40 MG: 40 TABLET, DELAYED RELEASE ORAL at 10:11

## 2024-11-13 RX ADMIN — MUPIROCIN: 20 OINTMENT TOPICAL at 10:11

## 2024-11-13 RX ADMIN — HYDROMORPHONE HYDROCHLORIDE 2 MG: 2 TABLET ORAL at 07:11

## 2024-11-13 RX ADMIN — DOXYCYCLINE HYCLATE 100 MG: 100 TABLET, COATED ORAL at 10:11

## 2024-11-13 RX ADMIN — HYDROMORPHONE HYDROCHLORIDE 2 MG: 2 TABLET ORAL at 12:11

## 2024-11-13 RX ADMIN — METHOCARBAMOL TABLETS 750 MG: 750 TABLET, COATED ORAL at 09:11

## 2024-11-13 RX ADMIN — SENNOSIDES 2 TABLET: 8.6 TABLET, FILM COATED ORAL at 10:11

## 2024-11-13 RX ADMIN — MUPIROCIN: 20 OINTMENT TOPICAL at 09:11

## 2024-11-13 RX ADMIN — AMITRIPTYLINE HYDROCHLORIDE 25 MG: 25 TABLET, FILM COATED ORAL at 09:11

## 2024-11-13 RX ADMIN — HYDROCORTISONE 5 MG: 5 TABLET ORAL at 04:11

## 2024-11-13 RX ADMIN — DOXYCYCLINE HYCLATE 100 MG: 100 TABLET, COATED ORAL at 09:11

## 2024-11-13 RX ADMIN — MICONAZOLE NITRATE: 20 POWDER TOPICAL at 10:11

## 2024-11-13 RX ADMIN — METHOCARBAMOL TABLETS 750 MG: 750 TABLET, COATED ORAL at 10:11

## 2024-11-13 RX ADMIN — HYDROCODONE BITARTRATE AND ACETAMINOPHEN 1 TABLET: 10; 325 TABLET ORAL at 05:11

## 2024-11-13 RX ADMIN — OXYBUTYNIN CHLORIDE 5 MG: 5 TABLET ORAL at 09:11

## 2024-11-13 RX ADMIN — ENOXAPARIN SODIUM 40 MG: 40 INJECTION SUBCUTANEOUS at 04:11

## 2024-11-13 RX ADMIN — TRAZODONE HYDROCHLORIDE 100 MG: 100 TABLET ORAL at 09:11

## 2024-11-13 RX ADMIN — ATORVASTATIN CALCIUM 80 MG: 40 TABLET, FILM COATED ORAL at 09:11

## 2024-11-13 RX ADMIN — OXYBUTYNIN CHLORIDE 5 MG: 5 TABLET ORAL at 02:11

## 2024-11-13 NOTE — PLAN OF CARE
Problem: Adult Inpatient Plan of Care  Goal: Patient-Specific Goal (Individualized)  Outcome: Progressing  Goal: Optimal Comfort and Wellbeing  Outcome: Progressing  Goal: Readiness for Transition of Care  Outcome: Progressing     Problem: Pain Chronic (Persistent)  Goal: Optimal Pain Control and Function  Outcome: Progressing     Problem: Fall Injury Risk  Goal: Absence of Fall and Fall-Related Injury  Outcome: Progressing     Problem: Skin Injury Risk Increased  Goal: Skin Health and Integrity  Outcome: Progressing     Problem: Wound  Goal: Optimal Functional Ability  Outcome: Progressing

## 2024-11-13 NOTE — ASSESSMENT & PLAN NOTE
Patient is identified as having Diastolic (HFpEF) heart failure that is Chronic. CHF is currently controlled.   Latest ECHO performed and demonstrates  - Results for orders placed during the hospital encounter of 05/08/24  Echo  Interpretation Summary    Left Ventricle: The left ventricle is normal in size. Normal wall thickness. There is normal systolic function. Biplane (2D) method of discs ejection fraction is 69%. Grade I diastolic dysfunction.    Right Ventricle: Normal right ventricular cavity size. Systolic function is normal. TAPSE is 3.66 cm.    Normal left atrial size. The left atrium volume index is 28.1 mL/m2.    Normal right atrial size.    The aortic valve is structurally normal. There is normal leaflet mobility.    The mitral valve is structurally normal. There is normal leaflet mobility.    The tricuspid valve is structurally normal. There is normal leaflet mobility.

## 2024-11-13 NOTE — PLAN OF CARE
OT evaluation completed.  Patient with PLOF of assistance from  for LB ADLs and bathing and with hx of more than 5  falls in the past 3 months. On evaluation this date patient able to perform lateral steps with rolling walker with Min A and grooming tasks with set up assistance. Skilled acute OT to follow. Recommend moderate level intensity. DME anticipated recommendations ongoing pending progression.    Problem: Occupational Therapy  Goal: Occupational Therapy Goal  Description: Goals to be met by: 12/11/2024     Patient will increase functional independence with ADLs by performing:    LE Dressing with Minimal Assistance.  Grooming while seated at sink with Set-up Assistance.  Toileting from bedside commode with Minimal Assistance for hygiene and clothing management post bowl movement.   Toilet transfer to bedside commode with Contact Guard Assistance with rolling walker.  100% reciprocation of at least fall prevention strategies.     Outcome: Progressing

## 2024-11-13 NOTE — ASSESSMENT & PLAN NOTE
Recurrent and associated with SPC  Antibiotics (From admission, onward)      Start     Stop Route Frequency Ordered    11/12/24 1300  doxycycline tablet 100 mg         -- Oral Every 12 hours 11/12/24 1150    11/10/24 2300  cefTRIAXone injection 1 g         -- IV Every 24 hours (non-standard times) 11/09/24 2320    11/10/24 0900  mupirocin 2 % ointment         11/15/24 0859 Nasl 2 times daily 11/09/24 9006

## 2024-11-13 NOTE — PLAN OF CARE
Problem: Physical Therapy  Goal: Physical Therapy Goal  Description: Goals to be met by: 24     Patient will increase functional independence with mobility by performin. Supine to sit with Stand-by Assistance  2. Sit to supine with Stand-by Assistance  3. Sit to stand transfer with Stand-by Assistance with use of RW.  4. Bed to chair transfer with Stand-by Assistance using Rolling Walker  5. Gait  x 50 feet with Stand-by Assistance using Rolling Walker.     Outcome: Progressing     PT evaluation completed. Pt's PLOF:  assistance with LBD, pt performing sponge baths and use of rollator for short distance mobility in home and w/c for longer distances. Pt at this time requires CGA with bed mobility and MOD A with transfer to standing/short distance ambulation to bedside chair with use of RW. *Pt reports 6-8 falls at home since October. PT recommending moderate intensity therapy. Therapy will continue to progress pt as able.

## 2024-11-13 NOTE — SUBJECTIVE & OBJECTIVE
Interval History: Virtual follow up visit for suspected Cellulitis of left lower extremity [L03.116]  Chest pain [R07.9]  Lymphedema of both lower extremities [I89.0]  Urinary tract infection associated with indwelling urethral catheter, initial encounter [T83.511A, N39.0] present on admission.   This service was provided by telemedicine.    The patient location is: [address]   Admitted 11/9/2024  6:28 PM    CC: LE cellulitis    The patient is able to provide adequate history. History was obtained from patient and past medical records.   No significant events overnight reported. and Nurse today reports rash under breasts  Patient complains of LE pain, lower abdominal pressure, urinary incontinence / leakage, dysuria      Data  Details     [x]   Lab results reviewed 11/13/2024  No new labs today    []   Micro reports reviewed 11/13/2024     []   Pathology reports reviewed 11/13/2024     []   Imaging reports reviewed 11/13/2024     []   Cardiology Procedure reports reviewed 11/13/2024     []   Non- records/CareEverywhere notes reviewed 11/13/2024      [x]  Tests/studies orders placed or verified 11/13/2024   Renal P, Mg, CBC    []  Independently viewed/assessed 11/13/2024      [x]  11/13/2024 Discussion of:  DC plan with CM     Review of Systems   Constitutional:  Negative for fever.   Respiratory:  Negative for shortness of breath.    Genitourinary:  Positive for dysuria.   Musculoskeletal:  Positive for back pain and gait problem.     Objective:     Vital Signs (Most Recent):  Temp: 97.5 °F (36.4 °C) (11/13/24 1123)  Pulse: (!) 59 (11/13/24 1200)  Resp: 20 (11/13/24 1123)  BP: 126/80 (11/13/24 1200)  SpO2: (!) 94 % (11/13/24 1200) Vital Signs (24h Range):  Temp:  [97.5 °F (36.4 °C)-97.9 °F (36.6 °C)] 97.5 °F (36.4 °C)  Pulse:  [55-69] 59  Resp:  [16-20] 20  SpO2:  [92 %-97 %] 94 %  BP: (113-134)/(53-80) 126/80     Weight: 100 kg (220 lb 7.4 oz)  Body mass index is 34.53 kg/m².    Intake/Output Summary (Last 24  hours) at 11/13/2024 1256  Last data filed at 11/13/2024 0943  Gross per 24 hour   Intake 718 ml   Output 4100 ml   Net -3382 ml         Physical Exam  Constitutional:       General: She is awake.      Appearance: She is ill-appearing.   Cardiovascular:      Comments: Monitor and/or Vital signs reviewed at time of visit  Pulmonary:      Effort: Pulmonary effort is normal. No accessory muscle usage or respiratory distress.   Musculoskeletal:      Right lower leg: Tenderness present.      Left lower leg: Tenderness present.   Neurological:      Mental Status: She is alert. She is not disoriented.   Psychiatric:         Attention and Perception: Attention normal.         Behavior: Behavior is cooperative.         Significant Labs:   Recent Labs   Lab 11/09/24 2016 11/12/24 0451   WBC 6.99 4.35   HGB 11.4* 10.3*   HCT 36.2* 33.4*    227     Recent Labs   Lab 11/09/24 2016 11/12/24 0451   GRAN 71.1  5.0 43.2  1.9   LYMPH 18.6  1.3 40.2  1.8   MONO 6.7  0.5 9.0  0.4   EOS 0.2 0.3     Recent Labs   Lab 11/09/24  2016 11/12/24 0451    142   K 3.5 4.0    107   CO2 26 27   BUN 12 7*   CREATININE 1.0 0.8   GLU 98 92   CALCIUM 9.7 8.7   ALBUMIN 3.6 2.6*   MG  --  1.9   PHOS  --  3.6     Recent Labs   Lab 11/09/24 2016   ALKPHOS 129   ALT 8*   AST 10   PROT 7.6   BILITOT 0.6     Recent Labs   Lab 11/09/24  2016   LACTATE 1.1     Microbiology Results (last 7 days)       Procedure Component Value Units Date/Time    Blood Culture #2 **CANNOT BE ORDERED STAT** [[id number]] Collected: 11/09/24 2010    Order Status: Completed Specimen: Blood from Peripheral, Antecubital, Left Updated: 11/13/24 0613     Blood Culture, Routine No Growth to date      No Growth to date      No Growth to date      No Growth to date    Blood Culture #1 **CANNOT BE ORDERED STAT** [[id number]] Collected: 11/09/24 1945    Order Status: Completed Specimen: Blood from Peripheral, Antecubital, Right Updated: 11/13/24 0613     Blood  Culture, Routine No Growth to date      No Growth to date      No Growth to date      No Growth to date          SARS-CoV2 (COVID-19) Qualitative PCR (no units)   Date Value   04/29/2023 Not Detected   12/12/2020 Not Detected   06/22/2020 Not Detected   06/10/2020 Not Detected   04/15/2020 Not Detected     SARS-CoV-2 RNA, Amplification, Qual (no units)   Date Value   02/18/2024 Negative   03/23/2023 Negative   01/17/2023 Negative   07/22/2022 Negative   05/12/2020 Negative     POC Rapid COVID (no units)   Date Value   09/09/2023 Negative   08/08/2023 Negative   04/28/2023 Negative   02/03/2022 Negative   06/02/2021 Negative   01/13/2021 Negative     Results for orders placed during the hospital encounter of 05/08/24    Echo    Interpretation Summary    Left Ventricle: The left ventricle is normal in size. Normal wall thickness. There is normal systolic function. Biplane (2D) method of discs ejection fraction is 69%. Grade I diastolic dysfunction.    Right Ventricle: Normal right ventricular cavity size. Systolic function is normal. TAPSE is 3.66 cm.    Normal left atrial size. The left atrium volume index is 28.1 mL/m2.    Normal right atrial size.    The aortic valve is structurally normal. There is normal leaflet mobility.    The mitral valve is structurally normal. There is normal leaflet mobility.    The tricuspid valve is structurally normal. There is normal leaflet mobility.      X-Ray Chest AP Portable  Narrative: EXAMINATION:  XR CHEST AP PORTABLE    CLINICAL HISTORY:  CHF;    TECHNIQUE:  Single frontal view of the chest was performed.    COMPARISON:  07/18/2024    FINDINGS:  Heart mediastinal contours appear stable.  Hypoventilatory changes with prominence of the interstitium again noted left greater than right.  Air for structure behind the heart on the right suggests hiatal hernia.  Dental implants and spinal cord stimulator noted.  Impression: Hypoventilatory changes with increased densities in the left  lung base.  Correlate for left lung base atelectasis versus residual infiltrate.  Not significantly changed since 07/18/2024 allowing for degree of inspiratory phase.    Electronically signed by: Josh Hernadez  Date:    08/30/2024  Time:    22:43      Labs and Imaging listed above were reviewed.

## 2024-11-13 NOTE — ASSESSMENT & PLAN NOTE
S/p SPC  Having urethral incontinence; needs follow up with Urology for Botox  Urine looks clear and no skin breakdown noted but patient having dysuria with urinary incontinence; trial of Ditropan started 11/13

## 2024-11-13 NOTE — PROGRESS NOTES
[facility]  Telemedicine Progress Note    Patient Name: Tasha Hawley  MRN: [id number]  Patient Class: IP- Inpatient   Admission Date: 11/9/2024  Length of Stay: 3 days  Attending Physician: Rebecca Chery MD  Primary Care Provider: Mesfin Hodges II, MD    Subjective:     Principal Problem:Cellulitis of left lower extremity    HPI:    Very pleasant 70 y/o F , obesity , chronic pain syndrome s/p dilaudid pain pump, CAD, HTN, presents with chills, subjective fevers , LLE swelling redness, tender, non purulent , 8/10 actue pain over past 2 days   Dysuria , sx of UTI   No sick contacts or travel     Overview/Hospital Course:  No notes on file    Interval History: Virtual follow up visit for suspected Cellulitis of left lower extremity [L03.116]  Chest pain [R07.9]  Lymphedema of both lower extremities [I89.0]  Urinary tract infection associated with indwelling urethral catheter, initial encounter [T83.511A, N39.0] present on admission.   This service was provided by telemedicine.    The patient location is: 26/26 A   Admitted 11/9/2024  6:28 PM    CC: LE cellulitis    The patient is able to provide adequate history. History was obtained from patient and past medical records.   No significant events overnight reported. and Nurse today reports rash under breasts  Patient complains of LE pain and HAs.       Data  Details     [x]   Lab results reviewed 11/12/2024  H&H at baseline. Lytes stable. sCr normal.     []   Micro reports reviewed 11/12/2024     []   Pathology reports reviewed 11/12/2024     []   Imaging reports reviewed 11/12/2024     []   Cardiology Procedure reports reviewed 11/12/2024     []   Non-HM records/CareEverywhere notes reviewed 11/12/2024      []  Tests/studies orders placed or verified 11/12/2024       []  Independently viewed/assessed 11/12/2024      [x]  11/12/2024 Discussion of:  Antibiotics with Pharmacist     Review of Systems   Constitutional:   Negative for fever.   Respiratory:  Negative for shortness of breath.    Musculoskeletal:  Positive for back pain and gait problem.   Neurological:  Positive for headaches.     Objective:     Vital Signs (Most Recent):  Temp: 97.9 °F (36.6 °C) (11/12/24 1204)  Pulse: (!) 59 (11/12/24 1204)  Resp: 18 (11/12/24 1204)  BP: 117/66 (11/12/24 1204)  SpO2: 95 % (11/12/24 1204) Vital Signs (24h Range):  Temp:  [97.6 °F (36.4 °C)-98.8 °F (37.1 °C)] 97.9 °F (36.6 °C)  Pulse:  [53-64] 59  Resp:  [16-20] 18  SpO2:  [93 %-98 %] 95 %  BP: ()/(51-74) 117/66     Weight: 100.1 kg (220 lb 10.9 oz)  Body mass index is 34.56 kg/m².    Intake/Output Summary (Last 24 hours) at 11/12/2024 1408  Last data filed at 11/12/2024 1230  Gross per 24 hour   Intake 720 ml   Output 1000 ml   Net -280 ml         Physical Exam  Constitutional:       General: She is awake.      Appearance: She is ill-appearing.   Cardiovascular:      Comments: Monitor and/or Vital signs reviewed at time of visit  Pulmonary:      Effort: Pulmonary effort is normal. No accessory muscle usage or respiratory distress.   Musculoskeletal:      Right lower leg: Tenderness present. Edema present.      Left lower leg: Tenderness present. Edema present.   Neurological:      Mental Status: She is alert. She is not disoriented.   Psychiatric:         Attention and Perception: Attention normal.         Behavior: Behavior is cooperative.         Significant Labs:   Recent Labs   Lab 11/09/24 2016 11/12/24 0451   WBC 6.99 4.35   HGB 11.4* 10.3*   HCT 36.2* 33.4*    227     Recent Labs   Lab 11/09/24 2016 11/12/24 0451   GRAN 71.1  5.0 43.2  1.9   LYMPH 18.6  1.3 40.2  1.8   MONO 6.7  0.5 9.0  0.4   EOS 0.2 0.3     Recent Labs   Lab 11/09/24  2016 11/12/24  0451    142   K 3.5 4.0    107   CO2 26 27   BUN 12 7*   CREATININE 1.0 0.8   GLU 98 92   CALCIUM 9.7 8.7   ALBUMIN 3.6 2.6*   MG  --  1.9   PHOS  --  3.6     Recent Labs   Lab 11/09/24 2016    ALKPHOS 129   ALT 8*   AST 10   PROT 7.6   BILITOT 0.6     Recent Labs   Lab 11/09/24 2016   LACTATE 1.1     Microbiology Results (last 7 days)       Procedure Component Value Units Date/Time    Blood Culture #2 **CANNOT BE ORDERED STAT** [[id number]] Collected: 11/09/24 2010    Order Status: Completed Specimen: Blood from Peripheral, Antecubital, Left Updated: 11/12/24 0613     Blood Culture, Routine No Growth to date      No Growth to date      No Growth to date    Blood Culture #1 **CANNOT BE ORDERED STAT** [[id number]] Collected: 11/09/24 1945    Order Status: Completed Specimen: Blood from Peripheral, Antecubital, Right Updated: 11/12/24 0613     Blood Culture, Routine No Growth to date      No Growth to date      No Growth to date          SARS-CoV2 (COVID-19) Qualitative PCR (no units)   Date Value   04/29/2023 Not Detected   12/12/2020 Not Detected   06/22/2020 Not Detected   06/10/2020 Not Detected   04/15/2020 Not Detected     SARS-CoV-2 RNA, Amplification, Qual (no units)   Date Value   02/18/2024 Negative   03/23/2023 Negative   01/17/2023 Negative   07/22/2022 Negative   05/12/2020 Negative     POC Rapid COVID (no units)   Date Value   09/09/2023 Negative   08/08/2023 Negative   04/28/2023 Negative   02/03/2022 Negative   06/02/2021 Negative   01/13/2021 Negative     Results for orders placed during the hospital encounter of 05/08/24    Echo    Interpretation Summary    Left Ventricle: The left ventricle is normal in size. Normal wall thickness. There is normal systolic function. Biplane (2D) method of discs ejection fraction is 69%. Grade I diastolic dysfunction.    Right Ventricle: Normal right ventricular cavity size. Systolic function is normal. TAPSE is 3.66 cm.    Normal left atrial size. The left atrium volume index is 28.1 mL/m2.    Normal right atrial size.    The aortic valve is structurally normal. There is normal leaflet mobility.    The mitral valve is structurally normal. There is  normal leaflet mobility.    The tricuspid valve is structurally normal. There is normal leaflet mobility.      X-Ray Chest AP Portable  Narrative: EXAMINATION:  XR CHEST AP PORTABLE    CLINICAL HISTORY:  CHF;    TECHNIQUE:  Single frontal view of the chest was performed.    COMPARISON:  07/18/2024    FINDINGS:  Heart mediastinal contours appear stable.  Hypoventilatory changes with prominence of the interstitium again noted left greater than right.  Air for structure behind the heart on the right suggests hiatal hernia.  Dental implants and spinal cord stimulator noted.  Impression: Hypoventilatory changes with increased densities in the left lung base.  Correlate for left lung base atelectasis versus residual infiltrate.  Not significantly changed since 07/18/2024 allowing for degree of inspiratory phase.    Electronically signed by: Josh Hernadez  Date:    08/30/2024  Time:    22:43      Labs and Imaging listed above were reviewed.     Assessment/Plan:      * Cellulitis of left lower extremity  IP admit , empiric IV antibiotics   Pain control   Wrap legs, wound care consult   ID consulted  Antibiotics (From admission, onward)      Start     Stop Route Frequency Ordered    11/12/24 1300  doxycycline tablet 100 mg         -- Oral Every 12 hours 11/12/24 1150    11/10/24 2300  cefTRIAXone injection 1 g         -- IV Every 24 hours (non-standard times) 11/09/24 2325    11/10/24 0900  mupirocin 2 % ointment         11/15/24 0859 Nasl 2 times daily 11/09/24 2321        EOT is 11/14  May start compression wraps of LEs    Urinary tract infection associated with cystostomy catheter  Recurrent and associated with SPC  Antibiotics (From admission, onward)      Start     Stop Route Frequency Ordered    11/12/24 1300  doxycycline tablet 100 mg         -- Oral Every 12 hours 11/12/24 1150    11/10/24 2300  cefTRIAXone injection 1 g         -- IV Every 24 hours (non-standard times) 11/09/24 2325    11/10/24 0900  mupirocin 2 %  ointment         11/15/24 0859 Nasl 2 times daily 11/09/24 2321            Chronic diastolic heart failure  Patient is identified as having Diastolic (HFpEF) heart failure that is Chronic. CHF is currently controlled.   Latest ECHO performed and demonstrates  - Results for orders placed during the hospital encounter of 05/08/24  Echo  Interpretation Summary    Left Ventricle: The left ventricle is normal in size. Normal wall thickness. There is normal systolic function. Biplane (2D) method of discs ejection fraction is 69%. Grade I diastolic dysfunction.    Right Ventricle: Normal right ventricular cavity size. Systolic function is normal. TAPSE is 3.66 cm.    Normal left atrial size. The left atrium volume index is 28.1 mL/m2.    Normal right atrial size.    The aortic valve is structurally normal. There is normal leaflet mobility.    The mitral valve is structurally normal. There is normal leaflet mobility.    The tricuspid valve is structurally normal. There is normal leaflet mobility.    Esophageal dysphagia  Likely partial source of atypical chest pain    Insomnia due to medical condition  Trazadone continued at decreased dose    Lymphedema of both lower extremities  AC Wraps as tolerated  Difficult at this time due to pain from cellulitis  Per ID: EOT for antibiotics is 11/14  May start compression wraps of LEs    Neurogenic bladder  S/p SPC  Having urethral incontinence; needs follow up with Urology for Botox    Chronic pain syndrome  Dilaudid intrathecal pump      VTE Risk Mitigation (From admission, onward)           Ordered     enoxaparin injection 40 mg  Daily         11/09/24 2321     IP VTE HIGH RISK PATIENT  Once         11/09/24 2321     Place sequential compression device  Until discontinued         11/09/24 2321                  I have completed this tele-visit without the assistance of a telepresenter.    The attending portion of this evaluation, treatment, and documentation was performed per Rebecca  FIONA Chery MD via Telemedicine [employer] using the secure [employer] software platform with 2 way audio/video. The provider was located off-site and the patient is located in the hospital. The aforementioned video software was utilized to document the relevant history and physical exam    I spent a total of 53 minutes on the day of the visit.This includes face to face time and non-face to face time preparing to see the patient (eg, review of tests), obtaining and/or reviewing separately obtained history, documenting clinical information in the electronic or other health record, independently interpreting results and communicating results to the patient/family/caregiver, or care coordinator.      Rebecca Chery MD  Department of Hospital Medicine   [facility]

## 2024-11-13 NOTE — PROGRESS NOTES
The sw spoke to the pt in reference to therapy recommendations for moderate intensity therapy and she states absolutely not. The pt states they recommended that at her last hospitalization and she discharged home. The sw offered to bring her a list but she declined stating she wants to do everything at home like she has been doing. She states she told therapy and the dr's and want them to respect her wishes. The sw will follow up with the pt again tomorrow. The pt's current with [facility] .

## 2024-11-13 NOTE — ASSESSMENT & PLAN NOTE
AC Wraps as tolerated  Difficult at this time due to pain from cellulitis  Per ID: EOT for antibiotics is 11/14  May start compression wraps of LEs

## 2024-11-13 NOTE — PT/OT/SLP EVAL
Occupational Therapy   Evaluation and treatment    Name: Tasha Hawley  MRN: [id number]  Admitting Diagnosis: Cellulitis of left lower extremity  Recent Surgery: * No surgery found *      Recommendations:     Discharge Recommendations: Moderate Intensity Therapy  Discharge Equipment Recommendations:  to be determined by next level of care  Barriers to discharge:   limited endurance; proximal strength deficits    Assessment:     Tasha Hawley is a 69 y.o. female with a medical diagnosis of Cellulitis of left lower extremity.  She presents with .The primary encounter diagnosis was Cellulitis of left lower extremity. Diagnoses of Lymphedema of both lower extremities, Urinary tract infection associated with indwelling urethral catheter, initial encounter, and Chest pain were also pertinent to this visit.  . Performance deficits affecting function:   weakness, impaired endurance, impaired sensation, impaired self care skills, impaired functional mobility, gait instability, impaired balance, pain, decreased safety awareness, decreased lower extremity function, decreased ROM, impaired skin, edema, impaired cardiopulmonary response to activity.     OT evaluation completed.  Patient with PLOF of assistance from  for LB ADLs and bathing and with hx of more than 5  falls in the past 3 months. On evaluation this date patient able to perform lateral steps with rolling walker with Min A and grooming tasks with set up assistance. Skilled acute OT to follow. Recommend moderate level intensity. DME anticipated recommendations ongoing pending progression.        Rehab Prognosis: Good and Fair; patient would benefit from acute skilled OT services to address these deficits and reach maximum level of function.       Plan:     Patient to be seen 4 x/week to address the above listed problems via self-care/home management, therapeutic activities, therapeutic exercises  Plan of Care Expires: 12/11/24  Plan of Care Reviewed  with: patient    Subjective     Chief Complaint: fatigue  Patient/Family Comments/goals: to go home    Occupational Profile:  Living Environment: lives with spouse in a single story home, ramp entrance; tub shower (does not use poor condition tub transfer bench)  Previous level of function: spouse assist with lower body dressing; has chronic catheter; performs sponge baths with light assist; usually supervision for functional mobility / transfers with rollator; use of wheelchair for longer distances; on home O2; does not drive. AT least 5 recent falls in past 3 months  Equipment Used at Home: wheelchair, rollator, walker, rolling, bedside commode, tub transfer bench, other (see comments) (tub transfer bench- broken)  Assistance upon Discharge: spouse (unsure of amount of assist able to provide)    Pain/Comfort:   Chief Complaint: pain to BLE  Pain/Comfort:  Pain Rating 1:  (8/10 BLE)  Pain Addressed 1: Reposition, Cessation of Activity, Nurse notified  Pain Rating Post-Intervention 9/10    Objective:     Communicated with: nursing prior to session.  Patient found HOB elevated with bed alarm, oxygen (Catheter; waffle overlay; heel protection boots/wedge) upon OT entry to room.    General Precautions: Standard,  fall precautions  Orthopedic Precautions: N/A  Braces: N/A  Respiratory Status: Nasal cannula, flow 3 L/min    Occupational Performance:    Bed Mobility:    Patient completed Supine to Sit with moderate assistance  Patient completed Sit to Supine with maximal assistance    Functional Mobility/Transfers:  Patient completed Sit <> Stand Transfer with moderate assistance  with  rolling walker   Functional Mobility: Standing marches with rolling walker/ CGA; Lateral side steps with rolling walker, minimal assist for rolling walker management; flexed posture; min verbal cues for task completion    Activities of Daily Living:  Grooming: SBA; oral rinse/hair brushing  Lower Body Dressing: maximal assistance  inference  Toileting: total assistance ;catheter    Cognitive/Visual Perceptual:  Cognitive/Psychosocial Skills:     -       Oriented to: Person, Place, Time, and Situation   -       Memory: short term recall intact  -       Safety awareness/insight to disability: impaired and with recurrent fall hx   -       Mood/Affect/Coping skills/emotional control: Cooperative  Visual/Perceptual:      -glasses; reports blurred vision (2/2 not having glasses with correct lenses)    Physical Exam:  Postural examination/scapula alignment:    -       Rounded shoulders  -       Forward head  -       Abnormal trunk flexion  Skin integrity: wrapped BLE (hx of lymphedema); dry feet  Sensation:    -       Impaired  decreased chronic feeling BLE/ hands  Upper Extremity Range of Motion:  WFL  Upper Extremity Strength: grossly WFL    AMPAC 6 Click ADL:  AMPAC Total Score:  16    Treatment & Education:  Patient educated on role of OT/ POC development. Occupational profile developed via interview. Vitals monitored as below. Instructed to slow down with movement. Patient guided to transition eob for assessment. Initiated ADL / functional mobility training as above with instruction on early activity, breathing with activity, hand placement on walker, and initial fall risk reduction education. Educated patient on importance of out of bed mobility and movement/repositioning throughout the day. Elevated BLE. Answered questions within scope.     11/13/24 1548 11/13/24 1553   Vital Signs   Pulse (!) 56 (!) 56   SpO2 97 % 97 %   BP (!) 114/55 128/72   MAP (mmHg) 79 94   BP Location Right arm Right arm   Patient Position Lying Sitting       Patient left HOB elevated with all lines intact, call button in reach, bed alarm on, and nursing notified    GOALS:   Multidisciplinary Problems       Occupational Therapy Goals          Problem: Occupational Therapy    Goal Priority Disciplines Outcome Interventions   Occupational Therapy Goal     OT, PT/OT  Progressing    Description: Goals to be met by: 12/11/2024     Patient will increase functional independence with ADLs by performing:    LE Dressing with Minimal Assistance.  Grooming while seated at sink with Set-up Assistance.  Toileting from bedside commode with Minimal Assistance for hygiene and clothing management post bowl movement.   Toilet transfer to bedside commode with Contact Guard Assistance with rolling walker.  100% reciprocation of at least fall prevention strategies.                          History:     Past Medical History:   Diagnosis Date    Anxiety     Arthritis     Bilateral lower extremity edema     severe chronic    Carotid artery occlusion     Cataract     CHF (congestive heart failure)     Coronary artery disease     subtotalled LAD with collateral    Depression     Fever blister     Hard of hearing     Hypokalemia 01/09/2023    Hyponatremia 02/04/2022    Hypothyroid     Iron deficiency anemia     Lumbar radiculopathy     with chronic pain    Ocular migraine     Other emphysema 05/22/2023    Renal disorder     Sleep apnea     cpap         Past Surgical History:   Procedure Laterality Date    ADENOIDECTOMY      BACK SURGERY      x 12    CARDIAC CATHETERIZATION  2016    subtotalled LAD with right to left collaterals    CATARACT EXTRACTION W/  INTRAOCULAR LENS IMPLANT Left     Dr Coleman     CYSTOSCOPIC LITHOLAPAXY N/A 6/27/2019    Procedure: CYSTOLITHOLAPAXY;  Surgeon: Shireen Mayo MD;  Location: [facility] OR [address] [place] [address];  Service: Urology;  Laterality: N/A;    CYSTOSCOPIC LITHOLAPAXY N/A 9/3/2019    Procedure: CYSTOLITHOLAPAXY;  Surgeon: Shireen Mayo MD;  Location: [facility] OR 2ND FLR;  Service: Urology;  Laterality: N/A;    CYSTOSCOPY N/A 7/13/2021    Procedure: CYSTOSCOPY;  Surgeon: Shireen Mayo MD;  [facility];  Service: Urology;  Laterality: N/A;    CYSTOSCOPY  11/16/2021    Procedure: CYSTOSCOPY;  Surgeon: Shireen Mayo MD;  Location: [facility] OR [address] [place] [address];  Service:  Urology;;    CYSTOSCOPY  7/19/2022    Procedure: CYSTOSCOPY;  Surgeon: Shireen Mayo MD;  Location: [facility] OR 1ST FLR;  Service: Urology;;    CYSTOSCOPY WITH INJECTION OF PERIURETHRAL BULKING AGENT  7/19/2022    Procedure: CYSTOSCOPY, WITH PERIURETHRAL BULKING AGENT INJECTION-MACROPLASTIQUE;  Surgeon: Shireen Mayo MD;  [facility];  Service: Urology;;    CYSTOSCOPY WITH INJECTION OF PERIURETHRAL BULKING AGENT N/A 3/28/2023    Procedure: CYSTOSCOPY, WITH PERIURETHRAL BULKING AGENT INJECTION;  Surgeon: Shireen Mayo MD;  Location: [facility] OR 1ST FLR;  Service: Urology;  Laterality: N/A;  [place]    CYSTOSCOPY WITH INJECTION OF PERIURETHRAL BULKING AGENT N/A 11/14/2023    Procedure: CYSTOSCOPY, WITH PERIURETHRAL BULKING AGENT INJECTION;  Surgeon: Shireen Mayo MD;  [facility];  Service: Urology;  Laterality: N/A;    CYSTOSCOPY,WITH BOTULINUM TOXIN INJECTION N/A 12/13/2022    Procedure: CYSTOSCOPY,WITH BOTULINUM TOXIN INJECTION;  Surgeon: Shireen Mayo MD;  [facility];  Service: Urology;  Laterality: N/A;  300 U    CYSTOSCOPY,WITH BOTULINUM TOXIN INJECTION N/A 3/28/2023    Procedure: CYSTOSCOPY,WITH BOTULINUM TOXIN INJECTION;  Surgeon: Shireen Mayo MD;  [facility];  Service: Urology;  Laterality: N/A;  45 MIN.    300 UNITS    ESOPHAGOGASTRODUODENOSCOPY N/A 5/23/2018    Procedure: ESOPHAGOGASTRODUODENOSCOPY (EGD);  Surgeon: Prince Vance MD;  Location: [facility] ENDO (4TH FLR);  Service: Endoscopy;  Laterality: N/A;  r/s 'd per Dr. Vance due to family emergency- ER    ESOPHAGOGASTRODUODENOSCOPY N/A 6/23/2023    Procedure: EGD (ESOPHAGOGASTRODUODENOSCOPY);  Surgeon: Juaquin Barry MD;  Location: [facility] ENDO (2ND FLR);  Service: Endoscopy;  Laterality: N/A;  Refferal: PRINCE VANCE  Order  tele enc 5/18/23  dx: history of a Nissen fundoplication  Additional Scheduling Information:  On home oxygen 2nd floor for airway protection also  with a pain pump elevated BMI close to 40   Prep Gastroparesis   ins    ESOPHAGOGASTRODUODENOSCOPY N/A 8/12/2024    Procedure: EGD (ESOPHAGOGASTRODUODENOSCOPY);  Surgeon: Cat Cortés MD;  Location: [facility] (2ND FLR);  Service: Endoscopy;  Laterality: N/A;  referral dr max / prep inst mailed / gastropareisis/  8/5-called pt for pre call-unable to lvm-portal message sent-tb-[facility]-intrathecal pain pump  8/7 precall complete -ml    HYSTERECTOMY  1975    endometriosis    INJECTION OF BOTULINUM TOXIN TYPE A  7/13/2021    Procedure: INJECTION, BOTULINUM TOXIN, 200units;  Surgeon: Shireen Mayo MD;  [facility];  Service: Urology;;    INJECTION OF BOTULINUM TOXIN TYPE A  11/16/2021    Procedure: INJECTION, BOTULINUM TOXIN, 200units;  Surgeon: Shireen Mayo MD;  Location: [facility] OR [address] [place] [address];  Service: Urology;;    INJECTION OF BOTULINUM TOXIN TYPE A  7/19/2022    Procedure: INJECTION, BOTULINUM TOXIN, 300 units ;  Surgeon: Shireen Mayo MD;  [facility];  Service: Urology;;    INJECTION OF BOTULINUM TOXIN TYPE A N/A 11/14/2023    Procedure: INJECTION, BOTULINUM TOXIN, TYPE A;  Surgeon: Shireen Mayo MD;  [facility];  Service: Urology;  Laterality: N/A;    INSERTION, SUPRAPUBIC CATHETER N/A 12/13/2022    Procedure: INSERTION, SUPRAPUBIC CATHETER;  Surgeon: Shireen Mayo MD;  [facility];  Service: Urology;  Laterality: N/A;  exchange    INSERTION, SUPRAPUBIC CATHETER N/A 11/14/2023    Procedure: INSERTION, SUPRAPUBIC CATHETER;  Surgeon: Shireen Mayo MD;  Location: [facility] OR [address] [place] [address];  Service: Urology;  Laterality: N/A;  EXCHANGE of s/p tube    pain pump placement      SQ Dilaudid Pump managed by Dr. Hillman, Pain Management    REMOVAL OF BONE SPUR OF FOOT Bilateral 9/16/2022    Procedure: EXCISION ARTHRITIC BONE, BILATERAL FOOT;  Surgeon: Adam Mcguire [facility];  Location: [facility];  Service: Podiatry;  Laterality:  Bilateral;    REPLACEMENT OF BACLOFEN PUMP N/A 8/2/2023    Procedure: REPLACEMENT, BACLOFEN PUMP;  Surgeon: Smitha Condon MD;  Location: [facility] OR 2ND [facility];  Service: Neurosurgery;  Laterality: N/A;  Anes: Gen  Blood: Type & Screen  Pos: Left Lat  Intrathecal Pump  Bed: Reg Reversed  Rad: C-arm  Pump: 40 cc, [employer]    REPLACEMENT OF CATHETER N/A 10/31/2019    Procedure: REPLACEMENT, CATHETER-SUPRAPUBIC;  Surgeon: Shireen Mayo MD;  Location: [facility] OR 1ST [facility];  Service: Urology;  Laterality: N/A;    SPINAL CORD STIMULATOR REMOVAL      before [place]    SPINE SURGERY  5-13-13    CERVICAL FUSION    TONSILLECTOMY         Time Tracking:     OT Date of Treatment: 11/13/24  OT Start Time: 1544  OT Stop Time: 1604  OT Total Time (min): 20 min    Billable Minutes:Evaluation 8 min  Therapeutic Activity 12 min    11/13/2024

## 2024-11-13 NOTE — ASSESSMENT & PLAN NOTE
IP admit , empiric IV antibiotics   Pain control   Wrap legs, wound care consult   ID consulted  Antibiotics (From admission, onward)      Start     Stop Route Frequency Ordered    11/12/24 1300  doxycycline tablet 100 mg         -- Oral Every 12 hours 11/12/24 1150    11/10/24 2300  cefTRIAXone injection 1 g         -- IV Every 24 hours (non-standard times) 11/09/24 2325    11/10/24 0900  mupirocin 2 % ointment         11/15/24 0859 Nasl 2 times daily 11/09/24 2321        EOT is 11/14  May start compression wraps of LEs

## 2024-11-14 VITALS
HEIGHT: 67 IN | RESPIRATION RATE: 18 BRPM | WEIGHT: 220.44 LBS | DIASTOLIC BLOOD PRESSURE: 72 MMHG | HEART RATE: 68 BPM | TEMPERATURE: 99 F | SYSTOLIC BLOOD PRESSURE: 112 MMHG | BODY MASS INDEX: 34.6 KG/M2 | OXYGEN SATURATION: 94 %

## 2024-11-14 LAB
ALBUMIN SERPL BCP-MCNC: 2.6 G/DL (ref 3.5–5.2)
ANION GAP SERPL CALC-SCNC: 6 MMOL/L (ref 8–16)
BASOPHILS # BLD AUTO: 0.02 K/UL (ref 0–0.2)
BASOPHILS NFR BLD: 0.4 % (ref 0–1.9)
BUN SERPL-MCNC: 8 MG/DL (ref 8–23)
CALCIUM SERPL-MCNC: 8.9 MG/DL (ref 8.7–10.5)
CHLORIDE SERPL-SCNC: 104 MMOL/L (ref 95–110)
CO2 SERPL-SCNC: 29 MMOL/L (ref 23–29)
CREAT SERPL-MCNC: 0.7 MG/DL (ref 0.5–1.4)
DIFFERENTIAL METHOD BLD: ABNORMAL
EOSINOPHIL # BLD AUTO: 0.3 K/UL (ref 0–0.5)
EOSINOPHIL NFR BLD: 5.6 % (ref 0–8)
ERYTHROCYTE [DISTWIDTH] IN BLOOD BY AUTOMATED COUNT: 15.1 % (ref 11.5–14.5)
EST. GFR  (NO RACE VARIABLE): >60 ML/MIN/1.73 M^2
GLUCOSE SERPL-MCNC: 97 MG/DL (ref 70–110)
HCT VFR BLD AUTO: 34.4 % (ref 37–48.5)
HGB BLD-MCNC: 10.7 G/DL (ref 12–16)
IMM GRANULOCYTES # BLD AUTO: 0.01 K/UL (ref 0–0.04)
IMM GRANULOCYTES NFR BLD AUTO: 0.2 % (ref 0–0.5)
LYMPHOCYTES # BLD AUTO: 2.1 K/UL (ref 1–4.8)
LYMPHOCYTES NFR BLD: 39.3 % (ref 18–48)
MAGNESIUM SERPL-MCNC: 2 MG/DL (ref 1.6–2.6)
MCH RBC QN AUTO: 26.6 PG (ref 27–31)
MCHC RBC AUTO-ENTMCNC: 31.1 G/DL (ref 32–36)
MCV RBC AUTO: 86 FL (ref 82–98)
MONOCYTES # BLD AUTO: 0.5 K/UL (ref 0.3–1)
MONOCYTES NFR BLD: 9.5 % (ref 4–15)
NEUTROPHILS # BLD AUTO: 2.4 K/UL (ref 1.8–7.7)
NEUTROPHILS NFR BLD: 45 % (ref 38–73)
NRBC BLD-RTO: 0 /100 WBC
PHOSPHATE SERPL-MCNC: 3.8 MG/DL (ref 2.7–4.5)
PLATELET # BLD AUTO: 224 K/UL (ref 150–450)
PMV BLD AUTO: 9.6 FL (ref 9.2–12.9)
POTASSIUM SERPL-SCNC: 3.8 MMOL/L (ref 3.5–5.1)
RBC # BLD AUTO: 4.02 M/UL (ref 4–5.4)
SODIUM SERPL-SCNC: 139 MMOL/L (ref 136–145)
WBC # BLD AUTO: 5.39 K/UL (ref 3.9–12.7)

## 2024-11-14 PROCEDURE — 94761 N-INVAS EAR/PLS OXIMETRY MLT: CPT

## 2024-11-14 PROCEDURE — 36415 COLL VENOUS BLD VENIPUNCTURE: CPT | Performed by: INTERNAL MEDICINE

## 2024-11-14 PROCEDURE — 83735 ASSAY OF MAGNESIUM: CPT | Performed by: INTERNAL MEDICINE

## 2024-11-14 PROCEDURE — 25000003 PHARM REV CODE 250: Performed by: INTERNAL MEDICINE

## 2024-11-14 PROCEDURE — 27000221 HC OXYGEN, UP TO 24 HOURS

## 2024-11-14 PROCEDURE — 80069 RENAL FUNCTION PANEL: CPT | Performed by: INTERNAL MEDICINE

## 2024-11-14 PROCEDURE — 97116 GAIT TRAINING THERAPY: CPT | Mod: CQ

## 2024-11-14 PROCEDURE — 97530 THERAPEUTIC ACTIVITIES: CPT | Mod: CQ

## 2024-11-14 PROCEDURE — 25000003 PHARM REV CODE 250: Performed by: STUDENT IN AN ORGANIZED HEALTH CARE EDUCATION/TRAINING PROGRAM

## 2024-11-14 PROCEDURE — 85025 COMPLETE CBC W/AUTO DIFF WBC: CPT | Performed by: INTERNAL MEDICINE

## 2024-11-14 PROCEDURE — 99900035 HC TECH TIME PER 15 MIN (STAT)

## 2024-11-14 PROCEDURE — 63600175 PHARM REV CODE 636 W HCPCS: Performed by: INTERNAL MEDICINE

## 2024-11-14 RX ORDER — MICONAZOLE NITRATE 2 G/100G
POWDER TOPICAL 2 TIMES DAILY
Qty: 85 G | Refills: 1 | Status: SHIPPED | OUTPATIENT
Start: 2024-11-14

## 2024-11-14 RX ORDER — CEFTRIAXONE 1 G/1
1 INJECTION, POWDER, FOR SOLUTION INTRAMUSCULAR; INTRAVENOUS
Status: DISCONTINUED | OUTPATIENT
Start: 2024-11-14 | End: 2024-11-14 | Stop reason: HOSPADM

## 2024-11-14 RX ADMIN — HYDROMORPHONE HYDROCHLORIDE 2 MG: 2 TABLET ORAL at 05:11

## 2024-11-14 RX ADMIN — HYDROCODONE BITARTRATE AND ACETAMINOPHEN 1 TABLET: 10; 325 TABLET ORAL at 10:11

## 2024-11-14 RX ADMIN — OXYBUTYNIN CHLORIDE 5 MG: 5 TABLET ORAL at 10:11

## 2024-11-14 RX ADMIN — DOXYCYCLINE HYCLATE 100 MG: 100 TABLET, COATED ORAL at 10:11

## 2024-11-14 RX ADMIN — HYDROCORTISONE 10 MG: 5 TABLET ORAL at 10:11

## 2024-11-14 RX ADMIN — QUETIAPINE FUMARATE 25 MG: 25 TABLET ORAL at 10:11

## 2024-11-14 RX ADMIN — CEFTRIAXONE SODIUM 1 G: 1 INJECTION, POWDER, FOR SOLUTION INTRAMUSCULAR; INTRAVENOUS at 01:11

## 2024-11-14 RX ADMIN — METHOCARBAMOL TABLETS 750 MG: 750 TABLET, COATED ORAL at 10:11

## 2024-11-14 RX ADMIN — MUPIROCIN: 20 OINTMENT TOPICAL at 10:11

## 2024-11-14 RX ADMIN — MICONAZOLE NITRATE: 20 POWDER TOPICAL at 10:11

## 2024-11-14 RX ADMIN — LEVOTHYROXINE SODIUM 150 MCG: 25 TABLET ORAL at 05:11

## 2024-11-14 RX ADMIN — PANTOPRAZOLE SODIUM 40 MG: 40 TABLET, DELAYED RELEASE ORAL at 10:11

## 2024-11-14 RX ADMIN — SENNOSIDES 2 TABLET: 8.6 TABLET, FILM COATED ORAL at 10:11

## 2024-11-14 NOTE — ASSESSMENT & PLAN NOTE
Recurrent and associated with SPC  Antibiotics (From admission, onward)      Start     Stop Route Frequency Ordered    11/12/24 1300  doxycycline tablet 100 mg         -- Oral Every 12 hours 11/12/24 1150    11/10/24 2300  cefTRIAXone injection 1 g         -- IV Every 24 hours (non-standard times) 11/09/24 2325    11/10/24 0900  mupirocin 2 % ointment         11/15/24 0859 Nasl 2 times daily 11/09/24 2321        Per ID: EOT is 11/14

## 2024-11-14 NOTE — PT/OT/SLP PROGRESS
Physical Therapy Treatment    Patient Name:  Tasha Hawley   MRN:  [id number]    Recommendations:     Discharge Recommendations: Moderate Intensity Therapy  Discharge Equipment Recommendations: to be determined by next level of care  Barriers to discharge:  decreased mobility,strength and endurance.    Assessment:     Tasha Hawley is a 69 y.o. female admitted with a medical diagnosis of Cellulitis of left lower extremity.  She presents with the following impairments/functional limitations: weakness, impaired endurance, impaired functional mobility, gait instability, impaired balance, decreased lower extremity function, pain, decreased ROM, impaired coordination, impaired skin, impaired joint extensibility,pt will be going home with  services today..    Rehab Prognosis: Fair; patient would benefit from acute skilled PT services to address these deficits and reach maximum level of function.    Recent Surgery: * No surgery found *      Plan:     During this hospitalization, patient to be seen 4 x/week to address the identified rehab impairments via gait training, therapeutic activities, therapeutic exercises, neuromuscular re-education and progress toward the following goals:    Plan of Care Expires:  12/13/24    Subjective     Chief Complaint: n/a  Patient/Family Comments/goals: pt is leaving soon.  Pain/Comfort:  Pain Rating 1:  (no rating)  Location - Side 1: Right  Location 1: leg  Pain Addressed 1: Reposition, Distraction, Cessation of Activity      Objective:     Communicated with nsg prior to session.  Patient found sitting edge of bed with meadows catheter, peripheral IV upon PT entry to room.     General Precautions: Standard, fall  Orthopedic Precautions: N/A  Braces: N/A  Respiratory Status: Room air     Functional Mobility:  Transfers:     Sit to Stand:  minimum assistance and X 3 trials with rolling walker  Gait: amb 6' up/back X 2 trials with Min/Mod A and v/c's  Balance: fair standing balance with  RW      AM-PAC 6 CLICK MOBILITY  Turning over in bed (including adjusting bedclothes, sheets and blankets)?: 3  Sitting down on and standing up from a chair with arms (e.g., wheelchair, bedside commode, etc.): 3  Moving from lying on back to sitting on the side of the bed?: 3  Moving to and from a bed to a chair (including a wheelchair)?: 3  Need to walk in hospital room?: 2  Climbing 3-5 steps with a railing?: 1  Basic Mobility Total Score: 15       Treatment & Education:reviewed pt safety and answered pt's questions/concerns.      Patient left sitting edge of bed with all lines intact, call button in reach, and nsg notified..    GOALS: see general POC  Multidisciplinary Problems       Physical Therapy Goals          Problem: Physical Therapy    Goal Priority Disciplines Outcome Interventions   Physical Therapy Goal     PT, PT/OT Progressing    Description: Goals to be met by: 24     Patient will increase functional independence with mobility by performin. Supine to sit with Stand-by Assistance  2. Sit to supine with Stand-by Assistance  3. Sit to stand transfer with Stand-by Assistance with use of RW.  4. Bed to chair transfer with Stand-by Assistance using Rolling Walker  5. Gait  x 50 feet with Stand-by Assistance using Rolling Walker.                          Time Tracking:     PT Received On: 24  PT Start Time: 1300     PT Stop Time: 1326  PT Total Time (min): 26 min     Billable Minutes: Gait Training 15 and Therapeutic Activity 11    Treatment Type: Treatment  PT/PTA: PTA     Number of PTA visits since last PT visit: 2024

## 2024-11-14 NOTE — ASSESSMENT & PLAN NOTE
Dilaudid intrathecal pump   [facility] of [facility] [address], [facility] [address] [phone]  F [phone]       Patient Name: Dolly Combs Date: 11/16/2022   OR Date: 11/16/2022   Visit Date: 11/17/2022        SUBJECTIVE:  Feeling well this morning. Urinary retention last night. Cyr replaced. About 1200cc urine out of catheter. Good urine output overnight. Complains of cramping. Pain medication not having much effect on symptoms. States that toradol was not effective at all. Oxycodone 5mg some help. Has been up walking. Tolerating diet. OBJECTIVE:    Patient Vitals for the past 24 hrs:   Temp Pulse Resp BP SpO2   11/17/22 0430 97.8 °F (36.6 °C) 81 16 123/76 98 %   11/17/22 0000 98.2 °F (36.8 °C) 91 16 125/74 99 %   11/16/22 2100 -- -- -- 134/87 --   11/16/22 2029 98 °F (36.7 °C) 80 16 (!) 137/90 100 %   11/16/22 1558 98 °F (36.7 °C) 99 16 122/79 99 %   11/16/22 1506 97.8 °F (36.6 °C) 81 16 120/80 96 %   11/16/22 1351 97.5 °F (36.4 °C) 79 14 126/82 100 %   11/16/22 1301 -- 72 14 122/87 100 %   11/16/22 1246 97.9 °F (36.6 °C) 85 19 117/84 100 %   11/16/22 1231 -- 96 21 94/67 100 %   11/16/22 1216 -- 84 14 119/81 100 %   11/16/22 1201 -- 78 18 116/80 100 %   11/16/22 1146 -- 77 15 110/77 100 %   11/16/22 1116 -- 77 21 111/77 100 %   11/16/22 1111 -- 76 20 108/72 100 %   11/16/22 1106 -- 80 14 105/78 98 %   11/16/22 1101 -- 76 20 97/65 100 %   11/16/22 1051 -- 79 20 100/62 100 %   11/16/22 1046 -- 79 23 100/60 100 %   11/16/22 1041 -- 89 19 101/63 100 %   11/16/22 1036 -- 90 19 100/61 100 %   11/16/22 1031 -- 87 (!) 31 105/61 100 %   11/16/22 1030 96.9 °F (36.1 °C) 87 15 105/61 100 %       Date 11/16/22 0700 - 11/17/22 0659 11/17/22 0700 - 11/18/22 0659   Shift 6575-2656 3714-2913 24 Hour Total 1536-6491 0291-6426 24 Hour Total   INTAKE   P.O.  840 840        P. O.  840 840      I. V.(mL/kg/hr) 1320(1.4) 0080.6(7.2) 3378.3(1.8) Volume (0.9% sodium chloride infusion)  2058. 3 2058. 3        Volume (lactated Ringers infusion) 1300  1300        Volume (cefOXitin (MEFOXIN) 2 g in sterile water (preservative free) 20 mL iv syringe) 20  20      Shift Total(mL/kg) 1320(17.1) 2898. 3(37.6) 4218. 3(54.8)      OUTPUT   Urine(mL/kg/hr) 300(0.3) 1450(1.6) 1750(0.9)        Urine Voided 50  50        Urine Output 250  250        Urine Output (mL) (Urinary Catheter 11/16/22 2- way)  1450 1450      Blood 30  30        Estimated Blood Loss 30  30      Shift Total(mL/kg) 330(4.3) 1450(18.8) 1780(23.1)       1448.3 2438. 3      Weight (kg) 77 77 77 77 77 77       Physical Exam     General:  alert, cooperative, no distress     Cardiac:  Regular rate and rhythm        Lungs:  clear to auscultation bilaterally  Abdomen:  soft, non-tender, without masses or organomegaly       Wound:  clean, dry   Extremity: extremities normal, atraumatic, no cyanosis or edema    Data Review  Lab Results   Component Value Date/Time    WBC 9.0 11/17/2022 06:20 AM    ABS. NEUTROPHILS 1.4 (L) 11/09/2022 03:30 PM    HGB 11.2 (L) 11/17/2022 06:20 AM    HCT 33.4 (L) 11/17/2022 06:20 AM    MCV 84.8 11/17/2022 06:20 AM    MCH 28.4 11/17/2022 06:20 AM    PLATELET 906 10/11/9199 06:20 AM     Lab Results   Component Value Date/Time    Sodium 140 11/17/2022 06:20 AM    Potassium 3.8 11/17/2022 06:20 AM    Chloride 111 (H) 11/17/2022 06:20 AM    CO2 20 (L) 11/17/2022 06:20 AM    Glucose 94 11/17/2022 06:20 AM    BUN 4 (L) 11/17/2022 06:20 AM    Creatinine 0.56 11/17/2022 06:20 AM    Calcium 7.9 (L) 11/17/2022 06:20 AM    Albumin 3.8 11/09/2022 03:30 PM    Bilirubin, total 0.5 11/09/2022 03:30 PM    ALT (SGPT) 35 11/09/2022 03:30 PM    Alk. phosphatase 44 (L) 11/09/2022 03:30 PM     IMPRESSION/PLAN:    1 Day Post-Op Procedure(s):  ROBOTIC ASSISTED LAPAROSCOPIC MYOMECTOMY for UTERINE FIBROIDS    Oncologic:  29 yo BF with symptomatic uterine fibroids.   She has a dominant uterine fibroid in the uterine fundus which has grown in size over the last few years. Her periods are heavy and painful. She came to me for myomectomy. Heme/CV:  Hgb 11.2. VSS. Hemodynamically stable. Renal:  Creatinine 0.56. good urine output overnight. Cyr out again this morning. Waiting to void. FEN/GI:  No nausea. Tolerating regular diet. ID/Wound:  Afebrile. Incisions and abdomen benign   PPX:  SCDs. Incentive spirometer. Encourage OOB   Dispostion:  Doing well postoperatively. Continue routine post op care. Hopefully home later today once able to void.         Brandt Daigle PA-C  11/17/2022  9:02 AM

## 2024-11-14 NOTE — PLAN OF CARE
The pt's current with [facility] and will resume at d/c. The sw faxed the pt's hh orders to them via [facility] and notified them of the pt's d/c today.     [facility] [address] Phone: [phone]   [address], [place] [address] 11/11/2024 14:05 (CT)  11/14/2024 15:00 (CT)  -  11/11/2024 14:18 (CT)  11/11/2024 14:18 (CT)  Response: Yes, willing to accept patient  Comments: thank you- let us know when DC date and orders in. Jeaneth

## 2024-11-14 NOTE — ASSESSMENT & PLAN NOTE
AC Wraps as tolerated  Difficult at this time due to pain from cellulitis  Per ID: EOT for antibiotics is 11/14  May start compression wraps of LEs per ID

## 2024-11-14 NOTE — PLAN OF CARE
Problem: Adult Inpatient Plan of Care  Goal: Patient-Specific Goal (Individualized)  Outcome: Progressing  Goal: Absence of Hospital-Acquired Illness or Injury  Outcome: Progressing     Problem: Pain Chronic (Persistent)  Goal: Optimal Pain Control and Function  Outcome: Progressing     Problem: Fall Injury Risk  Goal: Absence of Fall and Fall-Related Injury  Outcome: Progressing     Problem: Mobility Impairment  Goal: Optimal Mobility  Outcome: Progressing

## 2024-11-14 NOTE — PLAN OF CARE
Problem: Physical Therapy  Goal: Physical Therapy Goal  Description: Goals to be met by: 24     Patient will increase functional independence with mobility by performin. Supine to sit with Stand-by Assistance  2. Sit to supine with Stand-by Assistance  3. Sit to stand transfer with Stand-by Assistance with use of RW.  4. Bed to chair transfer with Stand-by Assistance using Rolling Walker  5. Gait  x 50 feet with Stand-by Assistance using Rolling Walker.     Outcome: Progressing

## 2024-11-14 NOTE — PLAN OF CARE
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       Follow Up Appointments:  Future Appointments   Date Time Provider Department Center   11/22/2024  9:20 AM Mesfin Hodges II, MD [facility]ierry [facility]acios formerly [facility]   11/29/2024  1:30 PM Florencio Armstrong MD [facility]   1/16/2025 11:00 AM Mesfin Hodges II, MD [facility]       Allergies:  Review of patient's allergies indicates:   Allergen Reactions    (d)-limonene flavor      Other reaction(s): difficult intubation  Other reaction(s): Difficulty breathing    Benadryl [diphenhydramine hcl] Shortness Of Breath    Fentanyl Itching, Nausea And Vomiting and Swelling             Imitrex [sumatriptan succinate] Shortness Of Breath    Oxycodone-acetaminophen Shortness Of Breath    Sulfamethoxazole-trimethoprim Anaphylaxis    Topiramate Shortness Of Breath    Vancomycin Anaphylaxis     Rash    Butorphanol tartrate     Darvocet a500 [propoxyphene n-acetaminophen]      Other reaction(s): Difficulty breathing    Evening primrose (oenothera biennis)     Latex     Pregabalin Other (See Comments)     tremors    Sumatriptan     White  petrolatum-zinc     Zinc acetate     Zinc oxide-white petrolatum      Other reaction(s): Difficulty breathing    Latex, natural rubber Itching and Rash    Phenytoin Rash and Other (See Comments)     Trouble breathing       Medications: Review discharge medications with patient and family and provide education.    Current Facility-Administered Medications   Medication Dose Route Frequency Provider Last Rate Last Admin    albuterol inhaler 2 puff  2 puff Inhalation Q6H PRN Rebecca Chery MD        aluminum-magnesium hydroxide-simethicone 200-200-20 mg/5 mL suspension 30 mL  30 mL Oral Q6H PRN Rebecca Chery MD   30 mL at 11/11/24 0831    amitriptyline tablet 25 mg  25 mg Oral QHS Damien Kearney MD   25 mg at 11/13/24 2118    atorvastatin tablet 80 mg  80 mg Oral QHS Rebecca Chery MD   80 mg at 11/13/24 2118    butalbital-acetaminophen-caffeine -40 mg per tablet 1 tablet  1 tablet Oral Q6H PRN Rebecca Chery MD   1 tablet at 11/12/24 1440    cefTRIAXone injection 1 g  1 g Intravenous Q24H Zaida Skelton MD        dextrose 10% bolus 125 mL 125 mL  12.5 g Intravenous PRN Damien Kearney MD        dextrose 10% bolus 250 mL 250 mL  25 g Intravenous PRN Damien Kearney MD        doxycycline tablet 100 mg  100 mg Oral Q12H Rebecca Chery MD   100 mg at 11/14/24 1040    enoxaparin injection 40 mg  40 mg Subcutaneous Daily Damien Kearney MD   40 mg at 11/13/24 1640    glucagon (human recombinant) injection 1 mg  1 mg Intramuscular PRN Damien Kearney MD        glucose chewable tablet 16 g  16 g Oral PRN Damien Kearney MD        glucose chewable tablet 24 g  24 g Oral PRN Damien Kearney MD        HYDROcodone-acetaminophen  mg per tablet 1 tablet  1 tablet Oral Q8H PRN Damien Kearney MD   1 tablet at 11/14/24 1039    hydrocortisone tablet 10 mg  10 mg Oral Daily Rebecca Chery MD   10 mg at 11/14/24 1040    hydrocortisone tablet 5 mg  5 mg Oral Before dinner  Rebecca Chery MD   5 mg at 11/13/24 1640    HYDROmorphone tablet 2 mg  2 mg Oral Q6H PRN Claude Nava MD   2 mg at 11/14/24 0508    levothyroxine tablet 150 mcg  150 mcg Oral Before breakfast Damien Kearney MD   150 mcg at 11/14/24 0508    methocarbamoL tablet 750 mg  750 mg Oral BID Rebecca Chery MD   750 mg at 11/14/24 1041    miconazole NITRATE 2 % top powder   Topical (Top) BID Rebecca Chery MD   Given at 11/14/24 1044    mupirocin 2 % ointment   Nasal BID Sanket Castro MD   Given at 11/14/24 1044    naloxone 0.4 mg/mL injection 0.02 mg  0.02 mg Intravenous PRN Damien Kearney MD        ondansetron injection 4 mg  4 mg Intravenous Q8H PRN Damien Kearney MD   4 mg at 11/10/24 1805    oxybutynin tablet 5 mg  5 mg Oral TID Rebecca Chery MD   5 mg at 11/14/24 1041    pantoprazole EC tablet 40 mg  40 mg Oral Daily Damien Kearney MD   40 mg at 11/14/24 1041    QUEtiapine tablet 100 mg  100 mg Oral Nightly Damien Kearney MD   100 mg at 11/13/24 2118    QUEtiapine tablet 25 mg  25 mg Oral Daily Damien Kearney MD   25 mg at 11/14/24 1041    senna tablet 2 tablet  2 tablet Oral BID Rebecca Chery MD   2 tablet at 11/14/24 1041    sodium chloride 0.9% flush 10 mL  10 mL Intravenous Q12H PRN Damien Kearney MD        traZODone tablet 100 mg  100 mg Oral QHS Damien Kearney MD   100 mg at 11/13/24 2118        Medication List        START taking these medications      miconazole NITRATE 2 % 2 % top powder  Commonly known as: MICOTIN  Apply topically 2 (two) times daily. Apply under breasts            CONTINUE taking these medications      albuterol 90 mcg/actuation inhaler  Commonly known as: PROVENTIL/VENTOLIN HFA  Inhale 2 puffs into the lungs every 6 (six) hours as needed for Shortness of Breath or Wheezing. Rescue     amitriptyline 25 MG tablet  Commonly known as: ELAVIL  Take 1 tablet (25 mg total) by mouth every evening.     atorvastatin 80 MG  tablet  Commonly known as: LIPITOR  TAKE ONE TABLET BY MOUTH ONCE DAILY     BIOFREEZE (MENTHOL) 10 % Crea  Generic drug: menthol  Apply topically as needed.     butalbital-acetaminophen-caffeine -40 mg -40 mg per tablet  Commonly known as: FIORICET, ESGIC  Take 1 tablet by mouth daily as needed for Headaches.     cyanocobalamin (vitamin B-12) 5,000 mcg Subl  Place 1 tablet under the tongue once daily.     darifenacin 7.5 MG Tb24  Commonly known as: ENABLEX  Take 1 tablet (7.5 mg total) by mouth once daily.     docusate sodium 100 MG capsule  Commonly known as: COLACE  Take 200 mg by mouth every evening.     DULoxetine 60 MG capsule  Commonly known as: CYMBALTA  Take 1 capsule (60 mg total) by mouth 2 (two) times daily.     furosemide 40 MG tablet  Commonly known as: LASIX  Take 1 tablet (40 mg total) by mouth once daily.     HYDROcodone-acetaminophen  mg per tablet  Commonly known as: NORCO  Take 1 tablet by mouth 5 (five) times daily.     * hydrocortisone 10 MG Tab  Commonly known as: CORTEF  Take 1 tablet (10 mg total) by mouth once daily.     * hydrocortisone 5 MG Tab  Commonly known as: CORTEF  Take 1 tablet (5 mg total) by mouth before dinner.     INTRATHECAL PAIN PUMP COMPOUND  Hydromorphone (7.5 mg/mL) infusion at 8.59 mg/day (0.3578 mg/hr) out of a total reservoir volume of 40 mL  Pump filled monthly     levothyroxine 150 MCG tablet  Commonly known as: SYNTHROID  Take 1 tablet (150 mcg total) by mouth before breakfast.     methocarbamoL 750 MG Tab  Commonly known as: ROBAXIN  Take 750 mg by mouth 2 (two) times a day.     neomycin-polymyxin-hydrocortisone 3.5-10,000-1 mg/mL-unit/mL-% otic suspension  Commonly known as: CORTISPORIN  Place 3 drops into both ears 4 (four) times daily.     nitroGLYCERIN 0.4 MG SL tablet  Commonly known as: NITROSTAT  Place 0.4 mg under the tongue every 5 (five) minutes as needed for Chest pain.     OYSTER SHELL CALCIUM-VIT D3 500 mg-5 mcg (200 unit) per  tablet  Generic drug: calcium-vitamin D3  Take 2 tablets by mouth 2 (two) times daily.     pantoprazole 40 MG tablet  Commonly known as: PROTONIX  Take 1 tablet (40 mg total) by mouth once daily.     * QUEtiapine 100 MG Tab  Commonly known as: SEROQUEL  Take 1 tablet (100 mg total) by mouth nightly.     * QUEtiapine 25 MG Tab  Commonly known as: SEROQUEL  Take 1 tablet (25 mg total) by mouth once daily.     senna 8.6 mg tablet  Commonly known as: SENNA LAX  Take 2 tablets by mouth 2 (two) times daily.     SPIRIVA RESPIMAT 2.5 mcg/actuation inhaler  Generic drug: tiotropium bromide  Inhale 2 puffs into the lungs once daily. Controller. Please restart taking.     tobramycin-dexAMETHasone 0.3-0.1% 0.3-0.1 % Drps  Commonly known as: TOBRADEX  Place 1 drop into both eyes every 6 (six) hours while awake.     traMADoL 50 mg tablet  Commonly known as: ULTRAM  Take 50 mg by mouth every 12 (twelve) hours as needed for Pain.     traZODone 300 MG tablet  Commonly known as: DESYREL  Take 300 mg by mouth every evening.           * This list has 4 medication(s) that are the same as other medications prescribed for you. Read the directions carefully, and ask your doctor or other care provider to review them with you.                STOP taking these medications      butalbital-aspirin-caffeine -40 mg -40 mg Cap  Commonly known as: FIORINAL     doxycycline 100 MG Cap  Commonly known as: VIBRAMYCIN                I have seen and examined this patient within the last 30 days. My clinical findings that support the need for the home health skilled services and home bound status are the following:no   Weakness/numbness causing balance and gait disturbance due to Infection and Weakness/Debility making it taxing to leave home.     Diet:   cardiac diet    Referrals/ Consults  Physical Therapy to evaluate and treat. Evaluate for home safety and equipment needs; Establish/upgrade home exercise program. Perform / instruct on  therapeutic exercises, gait training, transfer training, and Range of Motion.  Occupational Therapy to evaluate and treat. Evaluate home environment for safety and equipment needs. Perform/Instruct on transfers, ADL training, ROM, and therapeutic exercises.  Aide to provide assistance with personal care, ADLs, and vital signs.    Activities:   activity as tolerated    Nursing:   Agency to admit patient within 24 hours of hospital discharge unless specified on physician order or at patient request    SN to complete comprehensive assessment including routine vital signs. Instruct on disease process and s/s of complications to report to MD. Review/verify medication list sent home with the patient at time of discharge  and instruct patient/caregiver as needed. Frequency may be adjusted depending on start of care date.     Skilled nurse to perform up to 3 visits PRN for symptoms related to diagnosis    Notify MD if SBP > 160 or < 90; DBP > 90 or < 50; HR > 120 or < 50; Temp > 101; O2 < 88%    Ok to schedule additional visits based on staff availability and patient request on consecutive days within the home health episode.    When multiple disciplines ordered:    Start of Care occurs on Sunday - Wednesday schedule remaining discipline evaluations as ordered on separate consecutive days following the start of care.    Thursday SOC -schedule subsequent evaluations Friday and Monday the following week.     Friday - Saturday SOC - schedule subsequent discipline evaluations on consecutive days starting Monday of the following week.    For all post-discharge communication and subsequent orders please contact patient's primary care physician. If unable to reach primary care physician or do not receive response within 30 minutes, please contact [phone] for clinical staff order clarification    Miscellaneous   Motley Care: Instruct patient/caregiver to empty Motley bag.  Change Motley every month.    Home Health Aide:  Nursing  Three times weekly, Physical Therapy Three times weekly, Occupational Therapy Three times weekly, and Home Health Aide Three times weekly    Wound Care Orders  yes:  bilateral LE   Cleanse BLE wounds with Vashe wound cleanser and apply Xeroform dressing to wounds. Cover with abd pad and secure with cast padding and ACE.  Don heel boots when in bed.  Elevate BLE.     I certify that this patient is confined to her home and needs intermittent skilled nursing care, physical therapy, and occupational therapy.    Resume all other previous home health orders.     Zaida Skelton MD

## 2024-11-14 NOTE — PROGRESS NOTES
[facility] Medicine  Telemedicine Progress Note    Patient Name: Tasha Hawley  MRN: [id number]  Patient Class: IP- Inpatient   Admission Date: 11/9/2024  Length of Stay: 4 days  Attending Physician: Rebecca Chery MD  Primary Care Provider: Mesfin Hodges II, MD    Subjective:     Principal Problem:Cellulitis of left lower extremity    HPI:    Very pleasant 68 y/o F , obesity , chronic pain syndrome s/p dilaudid pain pump, CAD, HTN, presents with chills, subjective fevers , LLE swelling redness, tender, non purulent , 8/10 actue pain over past 2 days   Dysuria , sx of UTI   No sick contacts or travel     Overview/Hospital Course:  No notes on file    Interval History: Virtual follow up visit for suspected Cellulitis of left lower extremity [L03.116]  Chest pain [R07.9]  Lymphedema of both lower extremities [I89.0]  Urinary tract infection associated with indwelling urethral catheter, initial encounter [T83.511A, N39.0] present on admission.   This service was provided by telemedicine.    The patient location is: [facility]/26 A   Admitted 11/9/2024  6:28 PM    CC: LE cellulitis    The patient is able to provide adequate history. History was obtained from patient and past medical records.   No significant events overnight reported.   Patient complains of LE pain, lower abdominal pressure, urinary incontinence / leakage, dysuria      Data  Details     [x]   Lab results reviewed 11/13/2024  No new labs today    []   Micro reports reviewed 11/13/2024     []   Pathology reports reviewed 11/13/2024     []   Imaging reports reviewed 11/13/2024     []   Cardiology Procedure reports reviewed 11/13/2024     []   Non- records/CareEverywhere notes reviewed 11/13/2024      [x]  Tests/studies orders placed or verified 11/13/2024   Renal P, Mg, CBC    []  Independently viewed/assessed 11/13/2024      [x]  11/13/2024 Discussion of:  DC plan with CM     Review of Systems   Constitutional:  Negative for  fever.   Respiratory:  Negative for shortness of breath.    Genitourinary:  Positive for dysuria.   Musculoskeletal:  Positive for back pain and gait problem.     Objective:     Vital Signs (Most Recent):  Temp: 97.5 °F (36.4 °C) (11/13/24 1123)  Pulse: (!) 59 (11/13/24 1200)  Resp: 20 (11/13/24 1123)  BP: 126/80 (11/13/24 1200)  SpO2: (!) 94 % (11/13/24 1200) Vital Signs (24h Range):  Temp:  [97.5 °F (36.4 °C)-97.9 °F (36.6 °C)] 97.5 °F (36.4 °C)  Pulse:  [55-69] 59  Resp:  [16-20] 20  SpO2:  [92 %-97 %] 94 %  BP: (113-134)/(53-80) 126/80     Weight: 100 kg (220 lb 7.4 oz)  Body mass index is 34.53 kg/m².    Intake/Output Summary (Last 24 hours) at 11/13/2024 1256  Last data filed at 11/13/2024 0943  Gross per 24 hour   Intake 718 ml   Output 4100 ml   Net -3382 ml         Physical Exam  Constitutional:       General: She is awake.      Appearance: She is ill-appearing.   Cardiovascular:      Comments: Monitor and/or Vital signs reviewed at time of visit  Pulmonary:      Effort: Pulmonary effort is normal. No accessory muscle usage or respiratory distress.   Musculoskeletal:      Right lower leg: Tenderness present.      Left lower leg: Tenderness present.   Neurological:      Mental Status: She is alert. She is not disoriented.   Psychiatric:         Attention and Perception: Attention normal.         Behavior: Behavior is cooperative.         Significant Labs:   Recent Labs   Lab 11/09/24 2016 11/12/24  0451   WBC 6.99 4.35   HGB 11.4* 10.3*   HCT 36.2* 33.4*    227     Recent Labs   Lab 11/09/24 2016 11/12/24  0451   GRAN 71.1  5.0 43.2  1.9   LYMPH 18.6  1.3 40.2  1.8   MONO 6.7  0.5 9.0  0.4   EOS 0.2 0.3     Recent Labs   Lab 11/09/24  2016 11/12/24  0451    142   K 3.5 4.0    107   CO2 26 27   BUN 12 7*   CREATININE 1.0 0.8   GLU 98 92   CALCIUM 9.7 8.7   ALBUMIN 3.6 2.6*   MG  --  1.9   PHOS  --  3.6     Recent Labs   Lab 11/09/24 2016   ALKPHOS 129   ALT 8*   AST 10   PROT 7.6    BILITOT 0.6     Recent Labs   Lab 11/09/24 2016   LACTATE 1.1     Microbiology Results (last 7 days)       Procedure Component Value Units Date/Time    Blood Culture #2 **CANNOT BE ORDERED STAT** [[id number]] Collected: 11/09/24 2010    Order Status: Completed Specimen: Blood from Peripheral, Antecubital, Left Updated: 11/13/24 0613     Blood Culture, Routine No Growth to date      No Growth to date      No Growth to date      No Growth to date    Blood Culture #1 **CANNOT BE ORDERED STAT** [[id number]] Collected: 11/09/24 1945    Order Status: Completed Specimen: Blood from Peripheral, Antecubital, Right Updated: 11/13/24 0613     Blood Culture, Routine No Growth to date      No Growth to date      No Growth to date      No Growth to date          SARS-CoV2 (COVID-19) Qualitative PCR (no units)   Date Value   04/29/2023 Not Detected   12/12/2020 Not Detected   06/22/2020 Not Detected   06/10/2020 Not Detected   04/15/2020 Not Detected     SARS-CoV-2 RNA, Amplification, Qual (no units)   Date Value   02/18/2024 Negative   03/23/2023 Negative   01/17/2023 Negative   07/22/2022 Negative   05/12/2020 Negative     POC Rapid COVID (no units)   Date Value   09/09/2023 Negative   08/08/2023 Negative   04/28/2023 Negative   02/03/2022 Negative   06/02/2021 Negative   01/13/2021 Negative     Results for orders placed during the hospital encounter of 05/08/24    Echo    Interpretation Summary    Left Ventricle: The left ventricle is normal in size. Normal wall thickness. There is normal systolic function. Biplane (2D) method of discs ejection fraction is 69%. Grade I diastolic dysfunction.    Right Ventricle: Normal right ventricular cavity size. Systolic function is normal. TAPSE is 3.66 cm.    Normal left atrial size. The left atrium volume index is 28.1 mL/m2.    Normal right atrial size.    The aortic valve is structurally normal. There is normal leaflet mobility.    The mitral valve is structurally normal. There is  normal leaflet mobility.    The tricuspid valve is structurally normal. There is normal leaflet mobility.      X-Ray Chest AP Portable  Narrative: EXAMINATION:  XR CHEST AP PORTABLE    CLINICAL HISTORY:  CHF;    TECHNIQUE:  Single frontal view of the chest was performed.    COMPARISON:  07/18/2024    FINDINGS:  Heart mediastinal contours appear stable.  Hypoventilatory changes with prominence of the interstitium again noted left greater than right.  Air for structure behind the heart on the right suggests hiatal hernia.  Dental implants and spinal cord stimulator noted.  Impression: Hypoventilatory changes with increased densities in the left lung base.  Correlate for left lung base atelectasis versus residual infiltrate.  Not significantly changed since 07/18/2024 allowing for degree of inspiratory phase.    Electronically signed by: Josh Hernadez  Date:    08/30/2024  Time:    22:43      Labs and Imaging listed above were reviewed.     Assessment/Plan:      * Cellulitis of left lower extremity  IP admit , empiric IV antibiotics   Pain control   Wrap legs, wound care consult   ID consulted  Antibiotics (From admission, onward)      Start     Stop Route Frequency Ordered    11/12/24 1300  doxycycline tablet 100 mg         -- Oral Every 12 hours 11/12/24 1150    11/10/24 2300  cefTRIAXone injection 1 g         -- IV Every 24 hours (non-standard times) 11/09/24 2325    11/10/24 0900  mupirocin 2 % ointment         11/15/24 0859 Nasl 2 times daily 11/09/24 2321        EOT is 11/14  May start compression wraps of LEs    Urinary tract infection associated with cystostomy catheter  Recurrent and associated with SPC  Antibiotics (From admission, onward)      Start     Stop Route Frequency Ordered    11/12/24 1300  doxycycline tablet 100 mg         -- Oral Every 12 hours 11/12/24 1150    11/10/24 2300  cefTRIAXone injection 1 g         -- IV Every 24 hours (non-standard times) 11/09/24 2325    11/10/24 0900  mupirocin 2 %  ointment         11/15/24 0859 Nasl 2 times daily 11/09/24 2321        Per ID: EOT is 11/14    Chronic diastolic heart failure  Patient is identified as having Diastolic (HFpEF) heart failure that is Chronic. CHF is currently controlled.   Latest ECHO performed and demonstrates  - Results for orders placed during the hospital encounter of 05/08/24  Echo  Interpretation Summary    Left Ventricle: The left ventricle is normal in size. Normal wall thickness. There is normal systolic function. Biplane (2D) method of discs ejection fraction is 69%. Grade I diastolic dysfunction.    Right Ventricle: Normal right ventricular cavity size. Systolic function is normal. TAPSE is 3.66 cm.    Normal left atrial size. The left atrium volume index is 28.1 mL/m2.    Normal right atrial size.    The aortic valve is structurally normal. There is normal leaflet mobility.    The mitral valve is structurally normal. There is normal leaflet mobility.    The tricuspid valve is structurally normal. There is normal leaflet mobility.    Esophageal dysphagia  Likely partial source of atypical chest pain    Insomnia due to medical condition  Trazadone continued at decreased dose    Lymphedema of both lower extremities  AC Wraps as tolerated  Difficult at this time due to pain from cellulitis  Per ID: EOT for antibiotics is 11/14  May start compression wraps of LEs per ID    Neurogenic bladder  S/p SPC  Having urethral incontinence; needs follow up with Urology for Botox  Urine looks clear and no skin breakdown noted but patient having dysuria with urinary incontinence; trial of Ditropan started 11/13    Chronic pain syndrome  Dilaudid intrathecal pump      Social Drivers of Health with Concerns     Financial Resource Strain: High Risk (11/11/2024)    Overall Financial Resource Strain (CARDIA)     Difficulty of Paying Living Expenses: Very hard   Transportation Needs: No Transportation Needs (11/11/2024)    TRANSPORTATION NEEDS      Transportation : No   Recent Concern: Transportation Needs - Unmet Transportation Needs (11/10/2024)    TRANSPORTATION NEEDS     Transportation : Yes, it has kept me from medical appointments or from getting my medications.   Physical Activity: Inactive (11/11/2024)    Exercise Vital Sign     Days of Exercise per Week: 0 days     Minutes of Exercise per Session: 0 min   Stress: Stress Concern Present (11/11/2024)    [employer] - Occupational Stress Questionnaire     Feeling of Stress : Very much   Health Literacy: Adequate Health Literacy (11/11/2024)     Health Literacy     Frequency of need for help with medical instructions: Rarely   Recent Concern: Health Literacy - Inadequate Health Literacy (11/10/2024)     Health Literacy     Frequency of need for help with medical instructions: Sometimes       Active Hospital Problems    Diagnosis  POA    *Cellulitis of left lower extremity [L03.116]  Yes    Urinary tract infection associated with cystostomy catheter [T83.510A, N39.0]  Yes    Chronic diastolic heart failure [I50.32]  Yes    Esophageal dysphagia [R13.19]  Yes    Insomnia due to medical condition [G47.01]  Yes     Chronic    Lymphedema of both lower extremities [I89.0]  Yes    Neurogenic bladder [N31.9]  Yes     Chronic    Chronic pain syndrome [G89.4]  Yes     Chronic      Resolved Hospital Problems   No resolved problems to display.       Inpatient Medications Prescribed for Management of Current Problems:     Scheduled Meds:   amitriptyline  25 mg Oral QHS    atorvastatin  80 mg Oral QHS    cefTRIAXone (Rocephin) IV (PEDS and ADULTS)  1 g Intravenous Q24H    doxycycline  100 mg Oral Q12H    enoxparin  40 mg Subcutaneous Daily    hydrocortisone  10 mg Oral Daily    hydrocortisone  5 mg Oral Before dinner    levothyroxine  150 mcg Oral Before breakfast    methocarbamoL  750 mg Oral BID    miconazole NITRATE 2 %   Topical (Top) BID    mupirocin   Nasal BID    oxybutynin   5 mg Oral TID    pantoprazole  40 mg Oral Daily    QUEtiapine  100 mg Oral Nightly    QUEtiapine  25 mg Oral Daily    senna  2 tablet Oral BID    traZODone  100 mg Oral QHS     Continuous Infusions:  PRN Meds:.  Current Facility-Administered Medications:     albuterol, 2 puff, Inhalation, Q6H PRN    aluminum-magnesium hydroxide-simethicone, 30 mL, Oral, Q6H PRN    butalbital-acetaminophen-caffeine -40 mg, 1 tablet, Oral, Q6H PRN    dextrose 10%, 12.5 g, Intravenous, PRN    dextrose 10%, 25 g, Intravenous, PRN    glucagon (human recombinant), 1 mg, Intramuscular, PRN    glucose, 16 g, Oral, PRN    glucose, 24 g, Oral, PRN    HYDROcodone-acetaminophen, 1 tablet, Oral, Q8H PRN    HYDROmorphone, 2 mg, Oral, Q6H PRN    naloxone, 0.02 mg, Intravenous, PRN    ondansetron, 4 mg, Intravenous, Q8H PRN    sodium chloride 0.9%, 10 mL, Intravenous, Q12H PRN    VTE Risk Mitigation (From admission, onward)           Ordered     enoxaparin injection 40 mg  Daily         11/09/24 2321     IP VTE HIGH RISK PATIENT  Once         11/09/24 2321     Place sequential compression device  Until discontinued         11/09/24 2321                  I have completed this tele-visit without the assistance of a telepresenter.    The attending portion of this evaluation, treatment, and documentation was performed per Rebecca Chery MD via Telemedicine AudioVisual using the secure [employer] software platform with 2 way audio/video. The provider was located off-site and the patient is located in the hospital. The aforementioned video software was utilized to document the relevant history and physical exam    I spent a total of 51 minutes on the day of the visit.This includes face to face time and non-face to face time preparing to see the patient (eg, review of tests), obtaining and/or reviewing separately obtained history, documenting clinical information in the electronic or other health record, independently interpreting results and  communicating results to the patient/family/caregiver, or care coordinator.      Rebecca Chery MD  Department of [facility] Medicine   [facility]

## 2024-11-14 NOTE — PROGRESS NOTES
The pt's friend Fermin will transport her home at d/c. The sw stressed the importance of the pt going to her hsp f/u's and taking her medications. The pt acknowledged understanding and states she will comply. The pt has no further questions or Case Management needs and is clear to d/c home.     Future Appointments   Date Time Provider Department Center   11/19/2024  3:30 PM Juan A Madera MD [facility] PSYCH [address]   11/22/2024  9:20 AM Mesfin Hodges II, MD [facility] IM [address]   11/29/2024  1:30 PM Florencio Armstrong MD DESC [address]RO [address]   12/3/2024 11:00 AM Wilmar Baltazar DPM [facility] WOUND [facility] [place] [facility]   12/6/2024  8:00 AM Juan A Madera MD [facility] PSYCH [address]   1/16/2025 11:00 AM Mesfin Hodges II, MD [facility] IM [address]    [place] - Med Surg  Discharge Final Note    Primary Care Provider: Mesfin Hodges II, MD    Expected Discharge Date: 11/14/2024    Final Discharge Note (most recent)       Final Note - 11/14/24 1301          Post-Acute Status    Post-Acute Authorization Home Health     Home Health Status Set-up Complete/Auth obtained                     Important Message from Medicare             Contact Info       Mesfin Hodges II, MD   Specialty: Internal Medicine   Relationship: PCP - General    [address]  [facility] [address]   Phone: [phone]       Next Steps: Follow up on 11/22/2024    Instructions: 9:20am HSP F/U DX:Cellulitis pf left lower extremity    [facility] [address]   Specialty: Psychiatry    [address]  [place] [address]   Phone: [phone]       Next Steps: Follow up on 11/19/2024    Instructions: Dr. Juan A Madera 3:30pm    Florencio Armstrong MD   Specialty: Urology    [address] [facility] [address]  [facility] [address]   Phone: [phone]       Next Steps: Follow up on 11/29/2024    Instructions: 1:30pm    [facility]   Specialty: Psychiatry    [address]  [place] [address]   Phone: [phone]        Next Steps: Follow up on 12/6/2024    Instructions: 8:00am Dr. Juan A Madera    [facility]   Specialty: Home Health Services, Physical Therapy, Occupational Therapy    [address] [place] [address]   Phone: [phone]       Next Steps: Follow up    Instructions: The pt will resume with her current home health agency listed above after her hospital d/c. The pt's home health nurse will contact her with an arrival time.    [facility]   Specialty: Wound Care    [address] [place] [address]   Phone: [phone]       Next Steps: Follow up on 12/3/2024    Instructions: 11:00am Dr. Wilmar Baltazar

## 2024-11-15 ENCOUNTER — NURSE TRIAGE (OUTPATIENT)
Dept: ADMINISTRATIVE | Facility: CLINIC | Age: 69
End: 2024-11-15
Payer: MEDICARE

## 2024-11-15 LAB
BACTERIA BLD CULT: NORMAL
BACTERIA BLD CULT: NORMAL

## 2024-11-15 NOTE — TELEPHONE ENCOUNTER
Patient recently discharged. States that UTI symptoms returned. Patient c/o pelvic pressure and dysuria. Per protocol, patient was advised to be seen within the next 24 hours. Patient refuses to go to the ER or UC. She would like a prescription to be sent to her pharmacy. Triage nurse contacted Dr. Sutherland. He advised the patient to go to the ER or contact the office for an appointment on Monday. Attempted to schedule an appointment. However no appointments were available. Patient verbalizes understanding. Advised the patient to call back with any further questions or if symptoms worsen.          Reason for Disposition   Blood in urine (red, pink, or tea-colored)    Additional Information   Negative: Shock suspected (e.g., cold/pale/clammy skin, too weak to stand, low BP, rapid pulse)   Negative: Sounds like a life-threatening emergency to the triager   Negative: [1] Unable to urinate (or only a few drops) > 4 hours AND [2] bladder feels very full (e.g., palpable bladder or strong urge to urinate)   Negative: Vomiting   Negative: Patient sounds very sick or weak to the triager   Negative: Fever > 100.4 F (38.0 C)   Negative: Side (flank) or lower back pain present   Negative: [1] SEVERE pain with urination (e.g., excruciating) AND [2] not improved after 2 hours of pain medicine and Sitz bath   Negative: Diabetes mellitus or weak immune system (e.g., HIV positive, cancer chemo, splenectomy, organ transplant, chronic steroids)   Negative: Bedridden (e.g., CVA, chronic illness, recovering from surgery)   Negative: Artificial heart valve or artificial joint   Negative: Unusual vaginal discharge (e.g., bad smelling, yellow, green, or foamy-white)   Negative: Patient is worried they have a sexually transmitted infection (STI)   Negative: Possibility of pregnancy    Protocols used: Urination Pain - Female-A-

## 2024-11-16 ENCOUNTER — NURSE TRIAGE (OUTPATIENT)
Dept: ADMINISTRATIVE | Facility: CLINIC | Age: 69
End: 2024-11-16
Payer: MEDICARE

## 2024-11-16 NOTE — TELEPHONE ENCOUNTER
Patient states she has a bladder infection and she needs some antibiotics. She is c/o burning with urination and lower pelvic pressure. She has a catheter. She says she is leaking from her urethra. C/o nausea. Pain level 7-8/10. Spoke to on call Urologist, Dr. Briones. He states patient has been a no show multiple times and he doesn't think he has ever even seen her. He states she should go to urgent care to have her urine tested. Patient advised and voices understanding. Please contact caller directly to discuss any further care advice.    Reason for Disposition   Side (flank) or lower back pain present    Additional Information   Negative: Shock suspected (e.g., cold/pale/clammy skin, too weak to stand, low BP, rapid pulse)   Negative: Sounds like a life-threatening emergency to the triager   Negative: [1] Unable to urinate (or only a few drops) > 4 hours AND [2] bladder feels very full (e.g., palpable bladder or strong urge to urinate)   Negative: Vomiting   Negative: Patient sounds very sick or weak to the triager   Negative: [1] SEVERE pain with urination (e.g., excruciating) AND [2] not improved after 2 hours of pain medicine and Sitz bath   Negative: Fever > 100.4 F (38.0 C)    Protocols used: Urination Pain - Female-A-AH

## 2024-11-18 ENCOUNTER — OFFICE VISIT (OUTPATIENT)
Dept: INTERNAL MEDICINE | Facility: CLINIC | Age: 69
End: 2024-11-18
Payer: MEDICARE

## 2024-11-18 ENCOUNTER — TELEPHONE (OUTPATIENT)
Dept: INTERNAL MEDICINE | Facility: CLINIC | Age: 69
End: 2024-11-18

## 2024-11-18 VITALS
HEIGHT: 67 IN | DIASTOLIC BLOOD PRESSURE: 70 MMHG | HEART RATE: 71 BPM | SYSTOLIC BLOOD PRESSURE: 114 MMHG | OXYGEN SATURATION: 98 % | BODY MASS INDEX: 34.6 KG/M2 | WEIGHT: 220.44 LBS

## 2024-11-18 DIAGNOSIS — N39.0 URINARY TRACT INFECTION ASSOCIATED WITH CYSTOSTOMY CATHETER, INITIAL ENCOUNTER: Primary | ICD-10-CM

## 2024-11-18 DIAGNOSIS — T83.510A URINARY TRACT INFECTION ASSOCIATED WITH CYSTOSTOMY CATHETER, INITIAL ENCOUNTER: Primary | ICD-10-CM

## 2024-11-18 DIAGNOSIS — N39.46 MIXED STRESS AND URGE URINARY INCONTINENCE: ICD-10-CM

## 2024-11-18 LAB
BACTERIA #/AREA URNS AUTO: ABNORMAL /HPF
BILIRUB UR QL STRIP: NEGATIVE
CLARITY UR REFRACT.AUTO: ABNORMAL
COLOR UR AUTO: YELLOW
GLUCOSE UR QL STRIP: NEGATIVE
HGB UR QL STRIP: ABNORMAL
HYALINE CASTS UR QL AUTO: 0 /LPF
KETONES UR QL STRIP: NEGATIVE
LEUKOCYTE ESTERASE UR QL STRIP: ABNORMAL
MICROSCOPIC COMMENT: ABNORMAL
NITRITE UR QL STRIP: NEGATIVE
PH UR STRIP: 7 [PH] (ref 5–8)
PROT UR QL STRIP: ABNORMAL
RBC #/AREA URNS AUTO: >100 /HPF (ref 0–4)
SP GR UR STRIP: 1.02 (ref 1–1.03)
URN SPEC COLLECT METH UR: ABNORMAL
WBC #/AREA URNS AUTO: >100 /HPF (ref 0–5)
YEAST UR QL AUTO: ABNORMAL

## 2024-11-18 PROCEDURE — 3074F SYST BP LT 130 MM HG: CPT | Mod: CPTII,S$GLB,, | Performed by: INTERNAL MEDICINE

## 2024-11-18 PROCEDURE — 99214 OFFICE O/P EST MOD 30 MIN: CPT | Mod: 25,S$GLB,, | Performed by: INTERNAL MEDICINE

## 2024-11-18 PROCEDURE — 99999 PR PBB SHADOW E&M-EST. PATIENT-LVL III: CPT | Mod: PBBFAC,,, | Performed by: INTERNAL MEDICINE

## 2024-11-18 PROCEDURE — 81001 URINALYSIS AUTO W/SCOPE: CPT | Performed by: INTERNAL MEDICINE

## 2024-11-18 PROCEDURE — 3008F BODY MASS INDEX DOCD: CPT | Mod: CPTII,S$GLB,, | Performed by: INTERNAL MEDICINE

## 2024-11-18 PROCEDURE — 87086 URINE CULTURE/COLONY COUNT: CPT | Performed by: INTERNAL MEDICINE

## 2024-11-18 PROCEDURE — 96372 THER/PROPH/DIAG INJ SC/IM: CPT | Mod: S$GLB,,, | Performed by: INTERNAL MEDICINE

## 2024-11-18 PROCEDURE — 3044F HG A1C LEVEL LT 7.0%: CPT | Mod: CPTII,S$GLB,, | Performed by: INTERNAL MEDICINE

## 2024-11-18 PROCEDURE — 1101F PT FALLS ASSESS-DOCD LE1/YR: CPT | Mod: CPTII,S$GLB,, | Performed by: INTERNAL MEDICINE

## 2024-11-18 PROCEDURE — 87088 URINE BACTERIA CULTURE: CPT | Performed by: INTERNAL MEDICINE

## 2024-11-18 PROCEDURE — 3078F DIAST BP <80 MM HG: CPT | Mod: CPTII,S$GLB,, | Performed by: INTERNAL MEDICINE

## 2024-11-18 PROCEDURE — 1125F AMNT PAIN NOTED PAIN PRSNT: CPT | Mod: CPTII,S$GLB,, | Performed by: INTERNAL MEDICINE

## 2024-11-18 PROCEDURE — 87106 FUNGI IDENTIFICATION YEAST: CPT | Performed by: INTERNAL MEDICINE

## 2024-11-18 PROCEDURE — 3288F FALL RISK ASSESSMENT DOCD: CPT | Mod: CPTII,S$GLB,, | Performed by: INTERNAL MEDICINE

## 2024-11-18 RX ORDER — GENTAMICIN 40 MG/ML
240 INJECTION, SOLUTION INTRAMUSCULAR; INTRAVENOUS ONCE
Status: COMPLETED | OUTPATIENT
Start: 2024-11-18 | End: 2024-11-18

## 2024-11-18 RX ORDER — NITROFURANTOIN MACROCRYSTALS 50 MG/1
100 CAPSULE ORAL EVERY 12 HOURS
Qty: 28 CAPSULE | Refills: 0 | Status: SHIPPED | OUTPATIENT
Start: 2024-11-18 | End: 2024-11-22

## 2024-11-18 RX ADMIN — GENTAMICIN 240 MG: 40 INJECTION, SOLUTION INTRAMUSCULAR; INTRAVENOUS at 03:11

## 2024-11-18 NOTE — TELEPHONE ENCOUNTER
----- Message from Saba sent at 11/15/2024  3:59 PM CST -----  Contact: [phone]@[email or web address]  Good afternoon patient would like to request med called in for a UTI. Please call patient to advise  [phone]

## 2024-11-18 NOTE — PROGRESS NOTES
Patient received Gent 240mg for c/o UTI symptoms, SHe has a meadows cath. Advised to keep her catheter below the level of her bladder. Patient advised to wait 15 min then she can leave if no adverse symptoms from the Gentamicin

## 2024-11-18 NOTE — TELEPHONE ENCOUNTER
----- Message from Néstorscooter sent at 11/18/2024 12:11 PM CST -----  Contact: Nurse [phone]  Would like to receive medical advice.    Would they like a call back or a response via [facility]:  call back     Additional information:  Calling to speak with the office about getting the pt an order for a urine test.

## 2024-11-18 NOTE — PROGRESS NOTES
INTERNAL MEDICINE CLINIC - SAME DAY APPOINTMENT  Progress Note    PRESENTING HISTORY     PCP: Mesfin Hodges II, MD    Chief Complaint/Reason for Visit:     Chief Complaint   Patient presents with    Urinary Tract Infection      History of Present Illness & ROS : Ms. Tasha Hawley is a 69 y.o. female.      11-16-24 message:   Patient states she has a bladder infection and she needs some antibiotics. She is c/o burning with urination and lower pelvic pressure. She has a catheter. She says she is leaking from her urethra. C/o nausea. Pain level 7-8/10. Spoke to on call Urologist, Dr. Briones. He states patient has been a no show multiple times and he doesn't think he has ever even seen her. He states she should go to urgent care to have her urine tested. Patient advised and voices understanding. Please contact caller directly to discuss any further care advice.     Today:    She was recently hospitalized 11-9 for left leg cellulitis and UTI (from suprapubic catheter) with IV Rocephin.    She reports dysuria, suprapubic pain for the past 3 days.  No fever. No vomiting.    Catheter has yellow, slightly cloudy urine.    PAST HISTORY:     Past Medical History:   Diagnosis Date    Anxiety     Arthritis     Bilateral lower extremity edema     severe chronic    Carotid artery occlusion     Cataract     CHF (congestive heart failure)     Coronary artery disease     subtotalled LAD with collateral    Depression     Fever blister     Hard of hearing     Hypokalemia 01/09/2023    Hyponatremia 02/04/2022    Hypothyroid     Iron deficiency anemia     Lumbar radiculopathy     with chronic pain    Ocular migraine     Other emphysema 05/22/2023    Renal disorder     Sleep apnea     cpap       Past Surgical History:   Procedure Laterality Date    ADENOIDECTOMY      BACK SURGERY      x 12    CARDIAC CATHETERIZATION  2016    subtotalled LAD with right to left collaterals    CATARACT EXTRACTION W/  INTRAOCULAR LENS IMPLANT Left      Dr Coleman     CYSTOSCOPIC LITHOLAPAXY N/A 6/27/2019    Procedure: CYSTOLITHOLAPAXY;  Surgeon: Shireen Mayo MD;  Location: [facility] OR 2ND FLR;  Service: Urology;  Laterality: N/A;    CYSTOSCOPIC LITHOLAPAXY N/A 9/3/2019    Procedure: CYSTOLITHOLAPAXY;  Surgeon: Shireen Mayo MD;  [facility] 2[facility] FLR;  Service: Urology;  Laterality: N/A;    CYSTOSCOPY N/A 7/13/2021    Procedure: CYSTOSCOPY;  Surgeon: Shireen Mayo MD;  [facility] 1ST FLR;  Service: Urology;  Laterality: N/A;    CYSTOSCOPY  11/16/2021    Procedure: CYSTOSCOPY;  Surgeon: Shireen Mayo MD;  [facility] 1ST FLR;  Service: Urology;;    CYSTOSCOPY  7/19/2022    Procedure: CYSTOSCOPY;  Surgeon: Shireen Mayo MD;  Location: [facility] OR 1ST FLR;  Service: Urology;;    CYSTOSCOPY WITH INJECTION OF PERIURETHRAL BULKING AGENT  7/19/2022    Procedure: CYSTOSCOPY, WITH PERIURETHRAL BULKING AGENT INJECTION-MACROPLASTIQUE;  Surgeon: Shireen Mayo MD;  Location: [facility] OR 1ST FLR;  Service: Urology;;    CYSTOSCOPY WITH INJECTION OF PERIURETHRAL BULKING AGENT N/A 3/28/2023    Procedure: CYSTOSCOPY, WITH PERIURETHRAL BULKING AGENT INJECTION;  Surgeon: Shireen Mayo MD;  Location: [facility] OR 1ST FLR;  Service: Urology;  Laterality: N/A;  [place]    CYSTOSCOPY WITH INJECTION OF PERIURETHRAL BULKING AGENT N/A 11/14/2023    Procedure: CYSTOSCOPY, WITH PERIURETHRAL BULKING AGENT INJECTION;  Surgeon: Shireen Mayo MD;  [facility] 1ST F[facility];  Service: Urology;  Laterality: N/A;    CYSTOSCOPY,WITH BOTULINUM TOXIN INJECTION N/A 12/13/2022    Procedure: CYSTOSCOPY,WITH BOTULINUM TOXIN INJECTION;  Surgeon: Shireen Mayo MD;  [facility] 1ST FLR;  Service: Urology;  Laterality: N/A;  300 U    CYSTOSCOPY,WITH BOTULINUM TOXIN INJECTION N/A 3/28/2023    Procedure: CYSTOSCOPY,WITH BOTULINUM TOXIN INJECTION;  Surgeon: Shireen Mayo MD;  Location: [facility] OR [address] [place] [address];  Service: Urology;  Laterality: N/A;  45 MIN.    300 UNITS     ESOPHAGOGASTRODUODENOSCOPY N/A 5/23/2018    Procedure: ESOPHAGOGASTRODUODENOSCOPY (EGD);  Surgeon: Prince Max MD;  Location: [facility] (4TH FLR);  Service: Endoscopy;  Laterality: N/A;  r/s 'd per Dr. Max due to family emergency- ER    ESOPHAGOGASTRODUODENOSCOPY N/A 6/23/2023    Procedure: EGD (ESOPHAGOGASTRODUODENOSCOPY);  Surgeon: Juaquin Barry MD;  Location: [facility]O (2ND FLR);  Service: Endoscopy;  Laterality: N/A;  Refferal: PRINCE MAX  Order  tele enc 5/18/23  dx: history of a Nissen fundoplication  Additional Scheduling Information:  On home oxygen 2nd floor for airway protection also with a pain pump elevated BMI close to 40   Prep Gastroparesis   ins    ESOPHAGOGASTRODUODENOSCOPY N/A 8/12/2024    Procedure: EGD (ESOPHAGOGASTRODUODENOSCOPY);  Surgeon: Cat Cortés MD;  Location: [facility] (2ND FLR);  Service: Endoscopy;  Laterality: N/A;  referral dr max / prep inst mailed / gastropareisis/  8/5-called pt for pre call-unable to lvm-portal message sent-tb-[facility]-intrathecal pain pump  8/7 precall complete -ml    HYSTERECTOMY  1975    endometriosis    INJECTION OF BOTULINUM TOXIN TYPE A  7/13/2021    Procedure: INJECTION, BOTULINUM TOXIN, 200units;  Surgeon: Shireen Mayo MD;  [facility];  Service: Urology;;    INJECTION OF BOTULINUM TOXIN TYPE A  11/16/2021    Procedure: INJECTION, BOTULINUM TOXIN, 200units;  Surgeon: Shireen Mayo MD;  [facility];  Service: Urology;;    INJECTION OF BOTULINUM TOXIN TYPE A  7/19/2022    Procedure: INJECTION, BOTULINUM TOXIN, 300 units ;  Surgeon: Shireen Mayo MD;  [facility];  Service: Urology;;    INJECTION OF BOTULINUM TOXIN TYPE A N/A 11/14/2023    Procedure: INJECTION, BOTULINUM TOXIN, TYPE A;  Surgeon: Shireen Mayo MD;  Location: [facility] OR 1ST FLR;  Service: Urology;  Laterality: N/A;    INSERTION, SUPRAPUBIC CATHETER N/A 12/13/2022    Procedure: INSERTION,  SUPRAPUBIC CATHETER;  Surgeon: Shireen Mayo MD;  Location: [facility] OR 1ST FLR;  Service: Urology;  Laterality: N/A;  exchange    INSERTION, SUPRAPUBIC CATHETER N/A 11/14/2023    Procedure: INSERTION, SUPRAPUBIC CATHETER;  Surgeon: Shireen Mayo MD;  Location: [facility] OR 1ST FLR;  Service: Urology;  Laterality: N/A;  EXCHANGE of s/p tube    pain pump placement      SQ Dilaudid Pump managed by Dr. Hillman, Pain Management    REMOVAL OF BONE SPUR OF FOOT Bilateral 9/16/2022    Procedure: EXCISION ARTHRITIC BONE, BILATERAL FOOT;  Surgeon: NICOLA Mcgrath;  Location: [facility] OR;  Service: Podiatry;  Laterality: Bilateral;    REPLACEMENT OF BACLOFEN PUMP N/A 8/2/2023    Procedure: REPLACEMENT, BACLOFEN PUMP;  Surgeon: Smitha Condon MD;  Location: [facility] OR 2ND FLR;  Service: Neurosurgery;  Laterality: N/A;  Anes: Gen  Blood: Type & Screen  Pos: Left Lat  Intrathecal Pump  Bed: Reg Reversed  Rad: C-arm  Pump: 40 cc, [employer]    REPLACEMENT OF CATHETER N/A 10/31/2019    Procedure: REPLACEMENT, CATHETER-SUPRAPUBIC;  Surgeon: Shireen Mayo MD;  Location: [facility] OR 1ST FLR;  Service: Urology;  Laterality: N/A;    SPINAL CORD STIMULATOR REMOVAL      before [place]    SPINE SURGERY  5-13-13    CERVICAL FUSION    TONSILLECTOMY         Family History   Problem Relation Name Age of Onset    Cancer Mother  55        breast    Cancer Father          esophagus,had laryngectomy    Esophageal cancer Father      Parkinsonism Maternal Grandmother      Tremor Maternal Grandmother      No Known Problems Brother      No Known Problems Brother      Heart disease Maternal Uncle klarissa     Colon cancer Maternal Uncle klarissa         Less than 60    No Known Problems Sister      No Known Problems Maternal Aunt      Cirrhosis Paternal Aunt          ETOH    Liver disease Paternal Aunt          ETOH    Liver disease Paternal Uncle          ETOH    Cirrhosis Paternal Uncle          ETOH    No Known Problems Maternal Grandfather      No  "Known Problems Paternal Grandmother      No Known Problems Paternal Grandfather      Melanoma Neg Hx      Amblyopia Neg Hx      Blindness Neg Hx      Cataracts Neg Hx      Diabetes Neg Hx      Glaucoma Neg Hx      Hypertension Neg Hx      Macular degeneration Neg Hx      Retinal detachment Neg Hx      Strabismus Neg Hx      Stroke Neg Hx      Thyroid disease Neg Hx      Celiac disease Neg Hx      Colon polyps Neg Hx      Cystic fibrosis Neg Hx      Crohn's disease Neg Hx      Inflammatory bowel disease Neg Hx      Liver cancer Neg Hx      Rectal cancer Neg Hx      Stomach cancer Neg Hx      Ulcerative colitis Neg Hx      Lymphoma Neg Hx         Social History     Socioeconomic History    Marital status:    Tobacco Use    Smoking status: Never    Smokeless tobacco: Never   Substance and Sexual Activity    Alcohol use: Not Currently    Drug use: Yes     Types: Marijuana     Comment: "medical marijuana gummies"     Social Drivers of Health     Financial Resource Strain: High Risk (11/11/2024)    Overall Financial Resource Strain (CARDIA)     Difficulty of Paying Living Expenses: Very hard   Food Insecurity: No Food Insecurity (11/11/2024)    Hunger Vital Sign     Worried About Running Out of Food in the Last Year: Never true     Ran Out of Food in the Last Year: Never true   Transportation Needs: No Transportation Needs (11/11/2024)    TRANSPORTATION NEEDS     Transportation : No   Recent Concern: Transportation Needs - Unmet Transportation Needs (11/10/2024)    TRANSPORTATION NEEDS     Transportation : Yes, it has kept me from medical appointments or from getting my medications.   Physical Activity: Inactive (11/11/2024)    Exercise Vital Sign     Days of Exercise per Week: 0 days     Minutes of Exercise per Session: 0 min   Stress: Stress Concern Present (11/11/2024)    [place] [place] [employer] - Occupational Stress Questionnaire     Feeling of Stress : Very much   Housing Stability: Low Risk  " (11/11/2024)    Housing Stability Vital Sign     Unable to Pay for Housing in the Last Year: No     Homeless in the Last Year: No       MEDICATIONS & ALLERGIES:     Current Outpatient Medications on File Prior to Visit   Medication Sig Dispense Refill    albuterol (PROVENTIL/VENTOLIN HFA) 90 mcg/actuation inhaler Inhale 2 puffs into the lungs every 6 (six) hours as needed for Shortness of Breath or Wheezing. Rescue 8.5 g 5    amitriptyline (ELAVIL) 25 MG tablet Take 1 tablet (25 mg total) by mouth every evening. 90 tablet 3    atorvastatin (LIPITOR) 80 MG tablet TAKE ONE TABLET BY MOUTH ONCE DAILY 90 tablet 3    butalbital-acetaminophen-caffeine -40 mg (FIORICET, ESGIC) -40 mg per tablet Take 1 tablet by mouth daily as needed for Headaches.      calcium-vitamin D3 (OS-JACKIE 500 + D3) 500 mg-5 mcg (200 unit) per tablet Take 2 tablets by mouth 2 (two) times daily. 120 tablet 11    cyanocobalamin, vitamin B-12, 5,000 mcg Subl Place 1 tablet under the tongue once daily.      darifenacin (ENABLEX) 7.5 MG Tb24 Take 1 tablet (7.5 mg total) by mouth once daily. 30 tablet 11    docusate sodium (COLACE) 100 MG capsule Take 200 mg by mouth every evening.      DULoxetine (CYMBALTA) 60 MG capsule Take 1 capsule (60 mg total) by mouth 2 (two) times daily. 180 capsule 0    furosemide (LASIX) 40 MG tablet Take 1 tablet (40 mg total) by mouth once daily. 90 tablet 1    HYDROcodone-acetaminophen (NORCO)  mg per tablet Take 1 tablet by mouth 5 (five) times daily.      hydrocortisone (CORTEF) 10 MG Tab Take 1 tablet (10 mg total) by mouth once daily. 90 tablet 3    hydrocortisone (CORTEF) 5 MG Tab Take 1 tablet (5 mg total) by mouth before dinner. 90 tablet 3    intrathecal pain pump compound Hydromorphone (7.5 mg/mL) infusion at 8.59 mg/day (0.3578 mg/hr) out of a total reservoir volume of 40 mL  Pump filled monthly      levothyroxine (SYNTHROID) 150 MCG tablet Take 1 tablet (150 mcg total) by mouth before breakfast.  30 tablet 11    menthol (BIOFREEZE, MENTHOL,) 10 % Crea Apply topically as needed.      methocarbamoL (ROBAXIN) 750 MG Tab Take 750 mg by mouth 2 (two) times a day.      miconazole NITRATE 2 % (MICOTIN) 2 % top powder Apply topically 2 (two) times daily. Apply under breasts 85 g 1    neomycin-polymyxin-hydrocortisone (CORTISPORIN) 3.5-10,000-1 mg/mL-unit/mL-% otic suspension Place 3 drops into both ears 4 (four) times daily. 10 mL 0    nitroGLYCERIN (NITROSTAT) 0.4 MG SL tablet Place 0.4 mg under the tongue every 5 (five) minutes as needed for Chest pain.      pantoprazole (PROTONIX) 40 MG tablet Take 1 tablet (40 mg total) by mouth once daily. 90 tablet 0    QUEtiapine (SEROQUEL) 100 MG Tab Take 1 tablet (100 mg total) by mouth nightly. 90 tablet 3    QUEtiapine (SEROQUEL) 25 MG Tab Take 1 tablet (25 mg total) by mouth once daily. 90 tablet 3    senna (SENNA LAX) 8.6 mg tablet Take 2 tablets by mouth 2 (two) times daily.      tiotropium bromide (SPIRIVA RESPIMAT) 2.5 mcg/actuation inhaler Inhale 2 puffs into the lungs once daily. Controller. Please restart taking. 4 g 1    tobramycin-dexAMETHasone 0.3-0.1% (TOBRADEX) 0.3-0.1 % DrpS Place 1 drop into both eyes every 6 (six) hours while awake. 5 mL 0    traMADoL (ULTRAM) 50 mg tablet Take 50 mg by mouth every 12 (twelve) hours as needed for Pain.      traZODone (DESYREL) 300 MG tablet Take 300 mg by mouth every evening.       No current facility-administered medications on file prior to visit.        Review of patient's allergies indicates:   Allergen Reactions    (d)-limonene flavor      Other reaction(s): difficult intubation  Other reaction(s): Difficulty breathing    Benadryl [diphenhydramine hcl] Shortness Of Breath    Fentanyl Itching, Nausea And Vomiting and Swelling             Imitrex [sumatriptan succinate] Shortness Of Breath    Oxycodone-acetaminophen Shortness Of Breath    Sulfamethoxazole-trimethoprim Anaphylaxis    Topiramate Shortness Of Breath     Vancomycin Anaphylaxis     Rash    Butorphanol tartrate     Darvocet a500 [propoxyphene n-acetaminophen]      Other reaction(s): Difficulty breathing    Evening primrose (oenothera biennis)     Latex     Pregabalin Other (See Comments)     tremors    Sumatriptan     White petrolatum-zinc     Zinc acetate     Zinc oxide-white petrolatum      Other reaction(s): Difficulty breathing    Latex, natural rubber Itching and Rash    Phenytoin Rash and Other (See Comments)     Trouble breathing       Medications Reconciliation:   I have reconciled the patient's home medications with the patient/family. I have updated all changes.  Refer to After-Visit Medication List.    OBJECTIVE:     Vital Signs:  Vitals:    11/18/24 1424   BP: 114/70   Pulse: 71     Wt Readings from Last 3 Encounters:   11/18/24 1424 100 kg (220 lb 7.4 oz)   11/12/24 2022 100 kg (220 lb 7.4 oz)   11/10/24 0045 100.1 kg (220 lb 10.9 oz)   11/09/24 1658 90.7 kg (200 lb)   09/16/24 1355 110.2 kg (242 lb 15.2 oz)     Body mass index is 34.53 kg/m².     Physical Exam:  General: Well developed, well nourished. No distress.  HEENT: Head is normocephalic, atraumatic  Eyes: Clear conjunctiva.  Neck: Supple, symmetrical neck; trachea midline.  Lungs:  normal respiratory effort.  Cardiovascular: Heart with regular rate and rhythm.    Abdomen: Abdomen is soft.    Psychiatric: Normal affect. Alert.      Laboratory  Lab Results   Component Value Date    WBC 5.39 11/14/2024    HGB 10.7 (L) 11/14/2024    HCT 34.4 (L) 11/14/2024     11/14/2024    CHOL 158 10/17/2024    TRIG 77 10/17/2024    HDL 54 10/17/2024    ALT 8 (L) 11/09/2024    AST 10 11/09/2024     11/14/2024    K 3.8 11/14/2024     11/14/2024    CREATININE 0.7 11/14/2024    BUN 8 11/14/2024    CO2 29 11/14/2024    TSH 5.888 (H) 07/01/2024    INR 1.9 (H) 08/02/2023    HGBA1C 5.3 05/03/2024     0 Result Notes       1 Follow-up Encounter      Component 2 mo ago   Urine Culture, Routine  Abnormal    ESCHERICHIA COLI  > 100,000 cfu/ml  No other significant isolate    Resulting Agency [facility]        Susceptibility       Escherichia coli     CULTURE, URINE     Amp/Sulbactam >16/8 mcg/mL Resistant     Ampicillin >16 mcg/mL Resistant     Cefazolin >16 mcg/mL Resistant     Cefepime <=2 mcg/mL Sensitive     Ceftriaxone <=1 mcg/mL Sensitive     Ciprofloxacin >2 mcg/mL Resistant     Ertapenem <=0.5 mcg/mL Sensitive     Gentamicin <=2 mcg/mL Sensitive     Levofloxacin >4 mcg/mL Resistant     Meropenem <=1 mcg/mL Sensitive     Nitrofurantoin <=32 mcg/mL Sensitive     Piperacillin/Tazo 16 mcg/mL Intermediate     Tobramycin >8 mcg/mL Resistant     Trimeth/Sulfa <=2/38 mcg/mL Sensitive         ASSESSMENT & PLAN:     Urinary tract infection associated with cystostomy catheter, initial encounter  Mixed stress and urge urinary incontinence    Reviewed hospital course and urine culture result.   Plan:  -     Urinalysis, Reflex to Urine Culture Urine, Supra Pubic; Future; Expected date: 11/18/2024    -     gentamicin injection 240 mg IM X 1 in clinic.  -     nitrofurantoin (MACRODANTIN) 50 MG capsule; Take 2 capsules (100 mg total) by mouth every 12 (twelve) hours.  Dispense: 28 capsule; Refill: 0      Follow up with PCP as scheduled in 4 days.    Scheduled Follow-up :  Future Appointments   Date Time Provider Department Center   11/19/2024  3:30 PM Juan A Madera MD [facility] PSYCH [address]   11/22/2024  9:20 AM Mesfin Hodges II, MD [facility] [address] PC[facility]   11/29/2024  1:30 PM Florencio Armstrong MD [facility] [place]   12/3/2024 11:00 AM Wilmar Baltazar DPM [facility] W[facility]   12/6/2024  8:00 AM Juan A Madera MD [facility] PSYCH [address]   1/16/2025 11:00 AM Mesfin Hodges II, MD [facility] [address] [facility]       After Visit Medication List :     Medication List            Accurate as of November 18, 2024  2:50 PM. If you have any questions, ask your nurse or doctor.                START taking these medications       nitrofurantoin 50 MG capsule  Commonly known as: MACRODANTIN  Take 2 capsules (100 mg total) by mouth every 12 (twelve) hours.  Started by: Josh Wilson MD            CONTINUE taking these medications      albuterol 90 mcg/actuation inhaler  Commonly known as: PROVENTIL/VENTOLIN HFA  Inhale 2 puffs into the lungs every 6 (six) hours as needed for Shortness of Breath or Wheezing. Rescue     amitriptyline 25 MG tablet  Commonly known as: ELAVIL  Take 1 tablet (25 mg total) by mouth every evening.     atorvastatin 80 MG tablet  Commonly known as: LIPITOR  TAKE ONE TABLET BY MOUTH ONCE DAILY     BIOFREEZE (MENTHOL) 10 % Crea  Generic drug: menthol     butalbital-acetaminophen-caffeine -40 mg -40 mg per tablet  Commonly known as: FIORICET, ESGIC     cyanocobalamin (vitamin B-12) 5,000 mcg Subl     darifenacin 7.5 MG Tb24  Commonly known as: ENABLEX  Take 1 tablet (7.5 mg total) by mouth once daily.     docusate sodium 100 MG capsule  Commonly known as: COLACE     DULoxetine 60 MG capsule  Commonly known as: CYMBALTA  Take 1 capsule (60 mg total) by mouth 2 (two) times daily.     furosemide 40 MG tablet  Commonly known as: LASIX  Take 1 tablet (40 mg total) by mouth once daily.     HYDROcodone-acetaminophen  mg per tablet  Commonly known as: NORCO     * hydrocortisone 10 MG Tab  Commonly known as: CORTEF  Take 1 tablet (10 mg total) by mouth once daily.     * hydrocortisone 5 MG Tab  Commonly known as: CORTEF  Take 1 tablet (5 mg total) by mouth before dinner.     INTRATHECAL PAIN PUMP COMPOUND     levothyroxine 150 MCG tablet  Commonly known as: SYNTHROID  Take 1 tablet (150 mcg total) by mouth before breakfast.     methocarbamoL 750 MG Tab  Commonly known as: ROBAXIN     miconazole NITRATE 2 % 2 % top powder  Commonly known as: MICOTIN  Apply topically 2 (two) times daily. Apply under breasts     neomycin-polymyxin-hydrocortisone 3.5-10,000-1 mg/mL-unit/mL-% otic suspension  Commonly known as:  CORTISPORIN  Place 3 drops into both ears 4 (four) times daily.     nitroGLYCERIN 0.4 MG SL tablet  Commonly known as: NITROSTAT     OYSTER SHELL CALCIUM-VIT D3 500 mg-5 mcg (200 unit) per tablet  Generic drug: calcium-vitamin D3  Take 2 tablets by mouth 2 (two) times daily.     pantoprazole 40 MG tablet  Commonly known as: PROTONIX  Take 1 tablet (40 mg total) by mouth once daily.     * QUEtiapine 100 MG Tab  Commonly known as: SEROQUEL  Take 1 tablet (100 mg total) by mouth nightly.     * QUEtiapine 25 MG Tab  Commonly known as: SEROQUEL  Take 1 tablet (25 mg total) by mouth once daily.     senna 8.6 mg tablet  Commonly known as: SENNA LAX  Take 2 tablets by mouth 2 (two) times daily.     SPIRIVA RESPIMAT 2.5 mcg/actuation inhaler  Generic drug: tiotropium bromide  Inhale 2 puffs into the lungs once daily. Controller. Please restart taking.     tobramycin-dexAMETHasone 0.3-0.1% 0.3-0.1 % Drps  Commonly known as: TOBRADEX  Place 1 drop into both eyes every 6 (six) hours while awake.     traMADoL 50 mg tablet  Commonly known as: ULTRAM     traZODone 300 MG tablet  Commonly known as: DESYREL           * This list has 4 medication(s) that are the same as other medications prescribed for you. Read the directions carefully, and ask your doctor or other care provider to review them with you.                   Where to Get Your Medications        These medications were sent to [facility]  [place] - [address]  [address], [place] [address]      Phone: [phone]   nitrofurantoin 50 MG capsule         Signing Physician:  Josh Wilson MD

## 2024-11-19 ENCOUNTER — DOCUMENT SCAN (OUTPATIENT)
Dept: HOME HEALTH SERVICES | Facility: HOSPITAL | Age: 69
End: 2024-11-19
Payer: MEDICARE

## 2024-11-19 ENCOUNTER — NURSE TRIAGE (OUTPATIENT)
Dept: ADMINISTRATIVE | Facility: CLINIC | Age: 69
End: 2024-11-19
Payer: MEDICARE

## 2024-11-19 ENCOUNTER — PATIENT MESSAGE (OUTPATIENT)
Dept: PSYCHIATRY | Facility: CLINIC | Age: 69
End: 2024-11-19
Payer: MEDICARE

## 2024-11-20 RX ORDER — TRAZODONE HYDROCHLORIDE 300 MG/1
TABLET ORAL
Qty: 45 TABLET | Refills: 3 | Status: SHIPPED | OUTPATIENT
Start: 2024-11-20

## 2024-11-20 NOTE — TELEPHONE ENCOUNTER
"Pt states "my wife has an appt with the dermatologist on November 15th and I want an appt the same time as her".   Explained to pt that the 15th was 4 days ago, she was scheduled for that day but that the appt was cancelled.   Advised to call the derm office in the AM to find out wife's appt time and question if a similar appt is available for her. Pt LORA.     Reason for Disposition   Requesting regular office appointment    Protocols used: Information Only Call - No Triage-A-    "

## 2024-11-20 NOTE — TELEPHONE ENCOUNTER
No care due was identified.  [facility] Embedded Care Due Messages. Reference number: [id number].   11/20/2024 10:21:04 AM CST

## 2024-11-20 NOTE — TELEPHONE ENCOUNTER
Patient requesting a refill of trazodone. From what I can tell it's been discontinued. Can you clarify?

## 2024-11-21 LAB — BACTERIA UR CULT: ABNORMAL

## 2024-11-21 NOTE — ASSESSMENT & PLAN NOTE
IP admit , empiric IV antibiotics   Pain control   Wrap legs, wound care consult   ID consulted    EOT is 11/14  May start compression wraps of LEs

## 2024-11-21 NOTE — DISCHARGE SUMMARY
[facility] Medicine  Discharge Summary      Patient Name: Tasha Hawley  MRN: [id number]  Patient Class: IP- Inpatient  Admission Date: 11/9/2024  Hospital Length of Stay: 5 days  Discharge Date and Time: 11/14/2024  2:40 PM  Attending Physician: No att. providers found   Discharging Provider: Zaida Skelton MD  Primary Care Provider: Mesfin Hodges II, MD      HPI:     Very pleasant 68 y/o F , obesity , chronic pain syndrome s/p dilaudid pain pump, CAD, HTN, presents with chills, subjective fevers , LLE swelling redness, tender, non purulent , 8/10 actue pain over past 2 days   Dysuria , sx of UTI   No sick contacts or travel     * No surgery found *      Hospital Course:   See problem based hospital course.     Goals of Care Treatment Preferences:  Code Status: Full Code    Health care agent: Dangelo Hawley (spouse)  Health care agent number: [phone]    Living Will: Yes     What is most important right now is to focus on curative/life-prolongation (regardless of treatment burdens).  Accordingly, we have decided that the best plan to meet the patient's goals includes continuing with treatment.      Consults:   Consults (From admission, onward)          Status Ordering Provider     Inpatient virtual consult to Hospital Medicine  Once        Provider:  (Not yet assigned)    Completed GULSHAN WHITNEY     Inpatient consult to Infectious Diseases  Once        Provider:  (Not yet assigned)    Completed GRADY VIDAL            Neuro  Chronic pain syndrome  Dilaudid intrathecal pump    Cardiac/Vascular  Chronic diastolic heart failure  Patient is identified as having Diastolic (HFpEF) heart failure that is Chronic. CHF is currently controlled.   Latest ECHO performed and demonstrates  - Results for orders placed during the hospital encounter of 05/08/24  Echo  Interpretation Summary    Left Ventricle: The left ventricle is normal in size. Normal wall thickness. There is normal  systolic function. Biplane (2D) method of discs ejection fraction is 69%. Grade I diastolic dysfunction.    Right Ventricle: Normal right ventricular cavity size. Systolic function is normal. TAPSE is 3.66 cm.    Normal left atrial size. The left atrium volume index is 28.1 mL/m2.    Normal right atrial size.    The aortic valve is structurally normal. There is normal leaflet mobility.    The mitral valve is structurally normal. There is normal leaflet mobility.    The tricuspid valve is structurally normal. There is normal leaflet mobility.    Renal/  Urinary tract infection associated with cystostomy catheter  Recurrent and associated with SPC  Antibiotics (From admission, onward)      None        Per ID: EOT is 11/14    Neurogenic bladder  S/p SPC  Having urethral incontinence; needs follow up with Urology for Botox  Urine looks clear and no skin breakdown noted but patient having dysuria with urinary incontinence; trial of Ditropan started 11/13    ID  * Cellulitis of left lower extremity  IP admit , empiric IV antibiotics   Pain control   Wrap legs, wound care consult   ID consulted    EOT is 11/14  May start compression wraps of LEs    GI  Esophageal dysphagia  Likely partial source of atypical chest pain    Other  Insomnia due to medical condition  Trazadone continued at decreased dose    Lymphedema of both lower extremities  AC Wraps as tolerated  Difficult at this time due to pain from cellulitis  Per ID: EOT for antibiotics is 11/14  May start compression wraps of LEs per ID      Final Active Diagnoses:    Diagnosis Date Noted POA    PRINCIPAL PROBLEM:  Cellulitis of left lower extremity [L03.116] 05/23/2024 Yes    Urinary tract infection associated with cystostomy catheter [T83.510A, N39.0] 06/03/2021 Yes    Chronic diastolic heart failure [I50.32] 01/13/2020 Yes    Esophageal dysphagia [R13.19] 05/23/2018 Yes    Insomnia due to medical condition [G47.01] 12/08/2017 Yes     Chronic    Lymphedema of both  lower extremities [I89.0] 03/02/2017 Yes    Neurogenic bladder [N31.9] 09/27/2016 Yes     Chronic    Chronic pain syndrome [G89.4] 05/01/2013 Yes     Chronic      Problems Resolved During this Admission:       Discharged Condition: stable    Disposition: Home-Health Care [facility]    Follow Up:   Follow-up Information       Mesfin Hodges II, MD Follow up on 11/22/2024.    Specialty: Internal Medicine  Why: 9:20am HSP F/U DX:Cellulitis pf left lower extremity  Contact information:  [address]  [facility] [address]  [phone]               [address] [facility] [address] Follow up on 11/19/2024.    Specialty: Psychiatry  Why: Dr. Juan A Madera 3:30pm  Contact information:  [address]  [facility] [id number] [phone]  Additional information:  [facility] [address]th Floor, [address]   Please park in [facility] and use [facility] elevator             Florencio Armstrong MD Follow up on 11/29/2024.    Specialty: Urology  Why: 1:30pm  Contact information:  [address] [facility] [address]  [address]  [facility] [address]  [phone]               [address] [facility] [address] Follow up on 12/6/2024.    Specialty: Psychiatry  Why: 8:00am Dr. Juan A Madera  Contact information:  [address]  [facility] [id number] [phone]  Additional information:  [facility] [address]th Floor, [address]   Please park in [facility] and use [facility] Medical Office elevator             [facility] - Follow up.    Specialties: Home Health Services, Physical Therapy, Occupational Therapy  Why: The pt will resume with her current home health agency listed above after her hospital d/c. The pt's home health nurse will contact her with an arrival time.  Contact information:  [address]  [address]  [facility] [address]  [phone]               [facility] Follow up on 12/3/2024.    Specialty: Wound Care  Why: 11:00am Dr. Wilmar Baltazar  Contact  information:  [address]  [facility] [address] [phone]  Additional information:  Please park in [address] or D and use the [address]. Check in at Medical Office Building [address].                         Patient Instructions:      Ambulatory referral/consult to Wound Clinic   Standing Status: Future   Referral Priority: Urgent Referral Type: Consultation   Referral Reason: Specialty Services Required   Requested Specialty: Wound Care   Number of Visits Requested: 1     Diet Cardiac     Notify your health care provider if you experience any of the following:  temperature >100.4     Notify your health care provider if you experience any of the following:  persistent nausea and vomiting or diarrhea     Notify your health care provider if you experience any of the following:  severe uncontrolled pain     Notify your health care provider if you experience any of the following:  difficulty breathing or increased cough     Notify your health care provider if you experience any of the following:  severe persistent headache     Notify your health care provider if you experience any of the following:  worsening rash     Notify your health care provider if you experience any of the following:  persistent dizziness, light-headedness, or visual disturbances     Notify your health care provider if you experience any of the following:  increased confusion or weakness     Change dressing (specify)   Order Comments: Dressing change: 3 times per week by home health.     Activity as tolerated       Significant Diagnostic Studies: as above    Pending Diagnostic Studies:       None           Medications:  Reconciled Home Medications:      Medication List        START taking these medications      miconazole NITRATE 2 % 2 % top powder  Commonly known as: MICOTIN  Apply topically 2 (two) times daily. Apply under breasts            CONTINUE taking these medications      albuterol 90 mcg/actuation  inhaler  Commonly known as: PROVENTIL/VENTOLIN HFA  Inhale 2 puffs into the lungs every 6 (six) hours as needed for Shortness of Breath or Wheezing. Rescue     amitriptyline 25 MG tablet  Commonly known as: ELAVIL  Take 1 tablet (25 mg total) by mouth every evening.     atorvastatin 80 MG tablet  Commonly known as: LIPITOR  TAKE ONE TABLET BY MOUTH ONCE DAILY     BIOFREEZE (MENTHOL) 10 % Crea  Generic drug: menthol  Apply topically as needed.     butalbital-acetaminophen-caffeine -40 mg -40 mg per tablet  Commonly known as: FIORICET, ESGIC  Take 1 tablet by mouth daily as needed for Headaches.     cyanocobalamin (vitamin B-12) 5,000 mcg Subl  Place 1 tablet under the tongue once daily.     darifenacin 7.5 MG Tb24  Commonly known as: ENABLEX  Take 1 tablet (7.5 mg total) by mouth once daily.     docusate sodium 100 MG capsule  Commonly known as: COLACE  Take 200 mg by mouth every evening.     DULoxetine 60 MG capsule  Commonly known as: CYMBALTA  Take 1 capsule (60 mg total) by mouth 2 (two) times daily.     furosemide 40 MG tablet  Commonly known as: LASIX  Take 1 tablet (40 mg total) by mouth once daily.     HYDROcodone-acetaminophen  mg per tablet  Commonly known as: NORCO  Take 1 tablet by mouth 5 (five) times daily.     * hydrocortisone 10 MG Tab  Commonly known as: CORTEF  Take 1 tablet (10 mg total) by mouth once daily.     * hydrocortisone 5 MG Tab  Commonly known as: CORTEF  Take 1 tablet (5 mg total) by mouth before dinner.     INTRATHECAL PAIN PUMP COMPOUND  Hydromorphone (7.5 mg/mL) infusion at 8.59 mg/day (0.3578 mg/hr) out of a total reservoir volume of 40 mL  Pump filled monthly     levothyroxine 150 MCG tablet  Commonly known as: SYNTHROID  Take 1 tablet (150 mcg total) by mouth before breakfast.     methocarbamoL 750 MG Tab  Commonly known as: ROBAXIN  Take 750 mg by mouth 2 (two) times a day.     neomycin-polymyxin-hydrocortisone 3.5-10,000-1 mg/mL-unit/mL-% otic  suspension  Commonly known as: CORTISPORIN  Place 3 drops into both ears 4 (four) times daily.     nitroGLYCERIN 0.4 MG SL tablet  Commonly known as: NITROSTAT  Place 0.4 mg under the tongue every 5 (five) minutes as needed for Chest pain.     OYSTER SHELL CALCIUM-VIT D3 500 mg-5 mcg (200 unit) per tablet  Generic drug: calcium-vitamin D3  Take 2 tablets by mouth 2 (two) times daily.     pantoprazole 40 MG tablet  Commonly known as: PROTONIX  Take 1 tablet (40 mg total) by mouth once daily.     * QUEtiapine 100 MG Tab  Commonly known as: SEROQUEL  Take 1 tablet (100 mg total) by mouth nightly.     * QUEtiapine 25 MG Tab  Commonly known as: SEROQUEL  Take 1 tablet (25 mg total) by mouth once daily.     senna 8.6 mg tablet  Commonly known as: SENNA LAX  Take 2 tablets by mouth 2 (two) times daily.     SPIRIVA RESPIMAT 2.5 mcg/actuation inhaler  Generic drug: tiotropium bromide  Inhale 2 puffs into the lungs once daily. Controller. Please restart taking.     tobramycin-dexAMETHasone 0.3-0.1% 0.3-0.1 % Drps  Commonly known as: TOBRADEX  Place 1 drop into both eyes every 6 (six) hours while awake.     traMADoL 50 mg tablet  Commonly known as: ULTRAM  Take 50 mg by mouth every 12 (twelve) hours as needed for Pain.           * This list has 4 medication(s) that are the same as other medications prescribed for you. Read the directions carefully, and ask your doctor or other care provider to review them with you.                STOP taking these medications      butalbital-aspirin-caffeine -40 mg -40 mg Cap  Commonly known as: FIORINAL     doxycycline 100 MG Cap  Commonly known as: VIBRAMYCIN              Indwelling Lines/Drains at time of discharge:   Lines/Drains/Airways       Drain  Duration                  Suprapubic Catheter 02/12/24 1500  20 Fr. 282 days              Line  Duration                  Subcutaneous Infusion Pump Abdomen (Comment) -- days                    Time spent on the discharge of  patient: 45 minutes         The attending portion of this evaluation, treatment, and documentation was performed per Zaida Skelton MD via Telemedicine AudioVisual using the secure [employer] software platform with 2 way audio/video. The provider was located off-site and the patient is located in the hospital. The aforementioned video software was utilized to document the relevant history and physical exam    Zaida Skelton MD  Department of [facility] Medicine  [facility]

## 2024-11-21 NOTE — ASSESSMENT & PLAN NOTE
Recurrent and associated with SPC  Antibiotics (From admission, onward)    None      Per ID: EOT is 11/14

## 2024-11-22 ENCOUNTER — OFFICE VISIT (OUTPATIENT)
Dept: INTERNAL MEDICINE | Facility: CLINIC | Age: 69
End: 2024-11-22
Payer: MEDICARE

## 2024-11-22 VITALS — SYSTOLIC BLOOD PRESSURE: 128 MMHG | HEART RATE: 69 BPM | DIASTOLIC BLOOD PRESSURE: 82 MMHG

## 2024-11-22 DIAGNOSIS — I89.0 LYMPHEDEMA OF BOTH LOWER EXTREMITIES: ICD-10-CM

## 2024-11-22 DIAGNOSIS — R53.81 PHYSICAL DECONDITIONING: ICD-10-CM

## 2024-11-22 DIAGNOSIS — L03.116 CELLULITIS OF LEFT LOWER EXTREMITY: Primary | ICD-10-CM

## 2024-11-22 DIAGNOSIS — Z93.59 SUPRAPUBIC CATHETER: Chronic | ICD-10-CM

## 2024-11-22 DIAGNOSIS — G89.4 CHRONIC PAIN SYNDROME: Chronic | ICD-10-CM

## 2024-11-22 DIAGNOSIS — N31.9 NEUROGENIC BLADDER: Chronic | ICD-10-CM

## 2024-11-22 DIAGNOSIS — F11.90 CHRONIC, CONTINUOUS USE OF OPIOIDS: ICD-10-CM

## 2024-11-22 PROCEDURE — 99999 PR PBB SHADOW E&M-EST. PATIENT-LVL IV: CPT | Mod: PBBFAC,,, | Performed by: INTERNAL MEDICINE

## 2024-11-22 RX ORDER — CIPROFLOXACIN 500 MG/1
500 TABLET ORAL 2 TIMES DAILY
Qty: 10 TABLET | Refills: 0 | Status: SHIPPED | OUTPATIENT
Start: 2024-11-22 | End: 2024-11-22

## 2024-11-22 NOTE — PROGRESS NOTES
Subjective:       Patient ID: Tasha Hawley is a 69 y.o. female.    Chief Complaint:   Hospital Follow Up, Urinary Tract Infection, and Dysuria    HPI - this is a complex patient of mine who is here for hospital follow-up.  She was admitted November 9th through 14th for bilateral lower extremity cellulitis.  This improved with IV medication, and she was not given oral medication to take home.  She also complained of burning on urination despite having a suprapubic catheter.  Culture was done, and Candida grew.  Recommendation was to remove the suprapubic catheter, but she has not had that done yet.  Catheter change is due next week per home health.  She generally feels pretty well today.  She has an upcoming appointment with a new urologist, and I encouraged her to keep her appointment, as I am not comfortable managing her suprapubic catheter without their assistance.     PMH:  Multiple SDOH interfering with care  Neurogenic bladder, with recurrent UTI's. Suprapubic catheter  CAD, with ACS in Jan 2016  Ischemic cardiomyopathy  Chronic insomnia  Gross Lymphedema bilateral LE's with intermittent weeping and skin breakdown  Chronic low back pain with indwelling narcotic pump  History CVA with dysarthria and swallowing difficulty  Hypothyroidism  CECI, on CPAP  Anxiety and Depression  Chronic constipation, d/t ongoing narcotic use.  Dependent on home oxygen     Meds: Reviewed and reconciled in EPIC with patient during visit today.     Review of Systems   Constitutional:  Positive for fatigue. Negative for fever.   HENT:  Negative for congestion.    Respiratory:  Negative for shortness of breath.    Cardiovascular:  Negative for chest pain.   Gastrointestinal:  Negative for abdominal pain.   Genitourinary:  Positive for dysuria.   Musculoskeletal:  Positive for arthralgias and back pain.   Skin:  Negative for rash.   Neurological:  Negative for headaches.   Psychiatric/Behavioral:  Positive for sleep disturbance.         Objective:      Physical Exam  Constitutional:       Appearance: She is obese.   HENT:      Head: Normocephalic and atraumatic.   Pulmonary:      Effort: Pulmonary effort is normal.   Musculoskeletal:      Right lower leg: Edema (4+.  Minimal erythema bilaterally) present.      Left lower leg: Edema present.   Neurological:      Mental Status: She is alert. Mental status is at baseline.   Psychiatric:         Mood and Affect: Mood normal.         Assessment:       1. Cellulitis of left lower extremity    2. Chronic pain syndrome    3. Chronic, continuous use of opioids    4. Lymphedema of both lower extremities    5. Neurogenic bladder    6. Physical deconditioning    7. Suprapubic catheter    8. Urinary tract infection associated with cystostomy catheter, subsequent encounter    9. Dysuria    10. Insomnia due to medical condition        Plan:       Tasha was seen today for hospital follow up, urinary tract infection and dysuria.    Diagnoses and all orders for this visit:    Cellulitis of left lower extremity - greatly improved/resolved after IV antibiotics.  Continue to monitor.  She will be at risk as long as she has a lymphedema.    Chronic pain syndrome    Chronic, continuous use of opioids -stable; opioids given by outside provider.  I will only fill tramadol    Lymphedema of both lower extremities - unchanged.  Monitor    Neurogenic bladder - problematic.  She has a suprapubic catheter but continues to leak urine through her urethra.  I hope Urology will be able to help    Physical deconditioning    Suprapubic catheter - with Candida growing from the urine, she needs the catheter changed.  Encouraged her to work with home health to get that done.    RTC 3 months.    PAPO Hodges MD MPH  Staff Internist

## 2024-11-29 ENCOUNTER — OFFICE VISIT (OUTPATIENT)
Dept: UROLOGY | Facility: CLINIC | Age: 69
End: 2024-11-29
Payer: MEDICARE

## 2024-11-29 VITALS
BODY MASS INDEX: 34.6 KG/M2 | DIASTOLIC BLOOD PRESSURE: 62 MMHG | WEIGHT: 220.44 LBS | HEART RATE: 73 BPM | SYSTOLIC BLOOD PRESSURE: 108 MMHG | HEIGHT: 67 IN

## 2024-11-29 DIAGNOSIS — N39.45 CONTINUOUS LEAKAGE OF URINE: ICD-10-CM

## 2024-11-29 DIAGNOSIS — N39.46 MIXED STRESS AND URGE URINARY INCONTINENCE: Primary | ICD-10-CM

## 2024-11-29 DIAGNOSIS — N32.81 DETRUSOR OVERACTIVITY: ICD-10-CM

## 2024-11-29 DIAGNOSIS — Z43.5 ATTENTION TO CYSTOSTOMY: ICD-10-CM

## 2024-11-29 DIAGNOSIS — N31.9 NEUROGENIC BLADDER: Chronic | ICD-10-CM

## 2024-11-29 PROCEDURE — 99999 PR PBB SHADOW E&M-EST. PATIENT-LVL IV: CPT | Mod: PBBFAC,,, | Performed by: UROLOGY

## 2024-11-29 RX ORDER — SODIUM CHLORIDE 9 MG/ML
INJECTION, SOLUTION INTRAVENOUS CONTINUOUS
OUTPATIENT
Start: 2024-11-29

## 2024-11-29 RX ORDER — CIPROFLOXACIN 2 MG/ML
400 INJECTION, SOLUTION INTRAVENOUS
OUTPATIENT
Start: 2024-11-29

## 2024-11-29 NOTE — PROGRESS NOTES
Subjective:      Patient ID: Tasha Hawley is a 69 y.o. female.    Chief Complaint: No chief complaint on file.    68 yo WF with a a history of long-term suprapubic tube placement.  The patient is getting tube changed by home health.  She recently had her tube changed 2 weeks ago.  We will order for tube changes every 4 weeks and bag changes every week.  Patient has been discharged from prior practice but is due for Botox injection and presents for treatment of detrusor hyperactivity with Botox.  The patient has a history of prior bulking agent injection as well.  I told the patient that I have not been performing these lately will investigate if patient needs further treatment with that.  Patient recently had blood from her tube which cleared and has had issues in the past changing the suprapubic tube with the nurse practitioner or home health nurse at the house due to over folding of the balloon.  Dr. Marion call me was able to remove this and replace it.  The patient presents today for follow-up and scheduling of Botox injection.    Follow-up  This is a chronic (Detrusor overactivity) problem. The current episode started more than 1 year ago. The problem occurs constantly. Associated symptoms include urinary symptoms. Pertinent negatives include no abdominal pain, anorexia, arthralgias, change in bowel habit, chest pain, chills, congestion, coughing, diaphoresis, fatigue, fever, headaches, joint swelling, myalgias, nausea, neck pain, numbness, rash, sore throat, swollen glands, vertigo (Urinary frequency, urinary urgency, detrusor overactivity, urge urinary incontinence and chronic suprapubic tube.), visual change, vomiting or weakness. Nothing aggravates the symptoms. She has tried nothing for the symptoms. The treatment provided significant relief.     Review of Systems   Constitutional:  Negative for activity change, appetite change, chills, diaphoresis, fatigue and fever.   HENT:  Negative for congestion,  ear pain, hearing loss, nosebleeds, sinus pressure, sore throat and trouble swallowing.    Eyes:  Negative for pain and visual disturbance.   Respiratory:  Negative for apnea, cough and shortness of breath.    Cardiovascular:  Negative for chest pain and leg swelling.   Gastrointestinal:  Negative for abdominal distention, abdominal pain, anal bleeding, anorexia, blood in stool, change in bowel habit, constipation, diarrhea, nausea, rectal pain and vomiting.   Endocrine: Negative for cold intolerance, heat intolerance, polydipsia, polyphagia and polyuria.   Genitourinary:  Negative for decreased urine volume, difficulty urinating, dyspareunia, dysuria, enuresis, flank pain, frequency, genital sores, hematuria, menstrual problem, pelvic pain, urgency, vaginal bleeding, vaginal discharge and vaginal pain.   Musculoskeletal:  Negative for arthralgias, back pain, joint swelling, myalgias and neck pain.   Skin:  Negative for color change, pallor and rash.   Allergic/Immunologic: Negative for environmental allergies, food allergies and immunocompromised state.   Neurological:  Negative for dizziness, vertigo (Urinary frequency, urinary urgency, detrusor overactivity, urge urinary incontinence and chronic suprapubic tube.), speech difficulty, weakness, numbness and headaches.   Hematological:  Negative for adenopathy. Does not bruise/bleed easily.   Psychiatric/Behavioral: Negative.        Objective:     Physical Exam  Constitutional:       Appearance: Normal appearance. She is obese.   HENT:      Head: Normocephalic and atraumatic.      Right Ear: External ear normal.      Left Ear: External ear normal.      Nose: Nose normal. No congestion or rhinorrhea.      Mouth/Throat:      Mouth: Mucous membranes are moist.      Pharynx: Oropharynx is clear. No oropharyngeal exudate or posterior oropharyngeal erythema.   Eyes:      General: No scleral icterus.        Right eye: No discharge.         Left eye: No discharge.       Extraocular Movements: Extraocular movements intact.      Conjunctiva/sclera: Conjunctivae normal.      Pupils: Pupils are equal, round, and reactive to light.   Cardiovascular:      Pulses: Normal pulses.      Heart sounds: Normal heart sounds.   Pulmonary:      Effort: Pulmonary effort is normal.      Breath sounds: Normal breath sounds.   Abdominal:      General: Abdomen is flat. Bowel sounds are normal.      Palpations: Abdomen is soft.      Tenderness: There is no right CVA tenderness or left CVA tenderness.      Comments: SPT in Place, Site clear     Genitourinary:     General: Normal vulva.      Rectum: Normal.   Musculoskeletal:      Cervical back: Normal range of motion and neck supple.      Right lower leg: Edema present.      Left lower leg: Edema present.   Skin:     Capillary Refill: Capillary refill takes less than 2 seconds.   Neurological:      General: No focal deficit present.      Mental Status: She is alert and oriented to person, place, and time.      Motor: Weakness present.      Gait: Gait abnormal.   Psychiatric:         Mood and Affect: Mood normal.         Behavior: Behavior normal.         Thought Content: Thought content normal.         Judgment: Judgment normal.        Assessment:      1. Mixed stress and urge urinary incontinence    2. Continuous leakage of urine    3. Neurogenic bladder    4. Detrusor overactivity    5. Attention to cystostomy      Plan:     Patient Instructions   Plan patient to present for cystoscopy with Botox injection on 12/19/2024 [facility].  Will plan inject 200 units in 30 cc in 1 cc aliquots in more wall.  Patient will also undergo suprapubic change at that time.  Water for patient to have suprapubic tube change every 4 weeks by home health and have but change at least weekly by home health.  Patient will also start to use some drain sponge around with the tube inserted into the skin to help moisture and mucus down.

## 2024-11-29 NOTE — PATIENT INSTRUCTIONS
Plan patient to present for cystoscopy with Botox injection on 12/19/2024 [facility].  Will plan inject 200 units in 30 cc in 1 cc aliquots in more wall.  Patient will also undergo suprapubic change at that time.  Water for patient to have suprapubic tube change every 4 weeks by home health and have but change at least weekly by home health.  Patient will also start to use some drain sponge around with the tube inserted into the skin to help moisture and mucus down.

## 2024-12-02 ENCOUNTER — TELEPHONE (OUTPATIENT)
Dept: UROLOGY | Facility: CLINIC | Age: 69
End: 2024-12-02
Payer: MEDICARE

## 2024-12-02 ENCOUNTER — EXTERNAL HOME HEALTH (OUTPATIENT)
Dept: HOME HEALTH SERVICES | Facility: HOSPITAL | Age: 69
End: 2024-12-02
Payer: MEDICARE

## 2024-12-02 NOTE — TELEPHONE ENCOUNTER
Patient going for Botox on 12/19/24. Blood work last month was WNL and she has existing SPT. Urine with only Candida orthopsilosis 11/18/24. Need to repeat any labs?

## 2024-12-03 NOTE — TELEPHONE ENCOUNTER
Spoke to patient and instructions given for patient to start medication 10 days prior to procedure date, she understood

## 2024-12-09 NOTE — PLAN OF CARE
AAOx4. 3L NC. C/o pain. Medication administered per MAR. 1 person assist. Wound care complete. Suprapubic meadows in place. CHG bath complete. Pain pump in place. Safety maintained. Call light within reach. Pt encouraged to call for assistance.         Problem: Impaired Wound Healing  Goal: Optimal Wound Healing  Outcome: Ongoing, Progressing     Problem: Skin Injury Risk Increased  Goal: Skin Health and Integrity  Outcome: Ongoing, Progressing     Problem: Fall Injury Risk  Goal: Absence of Fall and Fall-Related Injury  Outcome: Ongoing, Progressing     Problem: Pain Chronic (Persistent) (Comorbidity Management)  Goal: Acceptable Pain Control and Functional Ability  Outcome: Ongoing, Progressing      no

## 2024-12-13 ENCOUNTER — TELEPHONE (OUTPATIENT)
Dept: UROLOGY | Facility: CLINIC | Age: 69
End: 2024-12-13
Payer: MEDICARE

## 2024-12-13 NOTE — TELEPHONE ENCOUNTER
----- Message from Camilla sent at 12/13/2024  2:01 PM CST -----  Type:  Orders request    Who Called: [facility] ([place])  Best Call Back Number: [phone] ext [phone]  Additional Information: Patient is requesting a call back in regards to  Verbal Orders needed for monthly cath changes

## 2024-12-13 NOTE — TELEPHONE ENCOUNTER
Spoke to home health nurse Kalina and notified her that per 's last note it is okay for continuation of home health with sp tube changes

## 2024-12-16 ENCOUNTER — TELEPHONE (OUTPATIENT)
Dept: UROLOGY | Facility: CLINIC | Age: 69
End: 2024-12-16
Payer: MEDICARE

## 2024-12-16 NOTE — TELEPHONE ENCOUNTER
I returned pt's call, she reports hematuria over the weekend which has mostly cleared today. I told Ms. Cordova that Dr. Armstrong would be looking in her bladder on Thursday 12/19 during her procedure so that he could identify and treat any possible causes for hematuria. She verbalized understanding of this.

## 2024-12-16 NOTE — TELEPHONE ENCOUNTER
----- Message from Loretta sent at 12/16/2024  3:33 PM CST -----  Type:  Needs Medical Advice    Who Called: pt   Symptoms (please be specific): blood in urine Symptom: Urination Pain  Outcome: Talk to a nurse or provider within 15 minutes.  Reason: Blood in urine  How long has patient had these symptoms:  12/13  Would the patient rather a call back or a response via [facility]? Call   Best Call Back Number:  [phone]  Additional Information: caller stated it's clear today

## 2024-12-19 PROBLEM — N39.45 CONTINUOUS LEAKAGE OF URINE: Status: ACTIVE | Noted: 2024-12-19

## 2024-12-23 ENCOUNTER — TELEPHONE (OUTPATIENT)
Dept: UROLOGY | Facility: CLINIC | Age: 69
End: 2024-12-23
Payer: MEDICARE

## 2024-12-23 NOTE — TELEPHONE ENCOUNTER
Spoke to patient and she states she had botox on 12/19, and she is still leaking, she wants to know if this is normal

## 2024-12-23 NOTE — TELEPHONE ENCOUNTER
----- Message from Lucia sent at 12/23/2024  2:19 PM CST -----  Type:  Needs Medical Advice/leaking after surgery     Who Called: pt    Would the patient rather a call back or a response via [facility]? Call   Best Call Back Number: [phone]  Additional Information: pt requesting a call back to discuss leaking after surgery.

## 2024-12-26 ENCOUNTER — TELEPHONE (OUTPATIENT)
Dept: UROLOGY | Facility: CLINIC | Age: 69
End: 2024-12-26
Payer: MEDICARE

## 2024-12-26 NOTE — TELEPHONE ENCOUNTER
----- Message from Emilie sent at 12/26/2024  3:26 PM CST -----  Type:  Needs Medical Advice    Who Called: Pt   Symptoms (please be specific): leakage and bladder infection    How long has patient had these symptoms:    Pharmacy name and phone #:    Would the patient rather a call back or a response via [facility]? Call back   Best Call Back Number: [phone]  Additional Information:

## 2024-12-26 NOTE — TELEPHONE ENCOUNTER
----- Message from Gabrielle sent at 12/26/2024 12:31 PM CST -----  Type:  Needs Medical Advice    [facility]  Would the patient rather a call back or a response via [facility]? call  Best Call Back Number:  ext [phone]  Additional Information: needing orders to continue home services for monthly catheter changes

## 2024-12-26 NOTE — TELEPHONE ENCOUNTER
----- Message from Etienne sent at 12/26/2024  1:05 PM CST -----  Contact: pt  Type: Requesting to speak with nurse        Who Called: PT  Regarding: pt has a bladder infection and leakage. Please advise.  Would the patient rather a call back or a response via [facility]? Call back  Best Call Back Number: [phone]   Additional Information:

## 2024-12-26 NOTE — TELEPHONE ENCOUNTER
I returned pt's call and advised her that we have sent a message to Dr. Armstrong regarding her symptoms and we are waiting for a response.

## 2024-12-26 NOTE — TELEPHONE ENCOUNTER
Patient is requesting medication for possible bladder infection, how would you like me to advise her?

## 2024-12-26 NOTE — TELEPHONE ENCOUNTER
----- Message from Gracie sent at 12/26/2024 10:16 AM CST -----  Regarding: Call back  Contact: [phone]  Who Called: PT     Type:  Needs Medical Advice    Who Called: PT   Symptoms (please be specific): Bladder Infection   How long has patient had these symptoms:   couple of days   Pharmacy name and phone #:  [facility] [place]  [place] - [address] Phone: [phone] Fax: [phone]  Would the patient rather a call back or a response via [facility]? Call back   Best Call Back Number:  [phone]  Additional Information:

## 2024-12-27 ENCOUNTER — OFFICE VISIT (OUTPATIENT)
Dept: DERMATOLOGY | Facility: CLINIC | Age: 69
End: 2024-12-27
Payer: MEDICARE

## 2024-12-27 DIAGNOSIS — L57.0 ACTINIC KERATOSIS: Primary | ICD-10-CM

## 2024-12-27 DIAGNOSIS — L72.3 SEBACEOUS CYST: ICD-10-CM

## 2024-12-27 PROCEDURE — 99999 PR PBB SHADOW E&M-EST. PATIENT-LVL III: CPT | Mod: PBBFAC,,, | Performed by: DERMATOLOGY

## 2024-12-27 NOTE — PROGRESS NOTES
Subjective:      Patient ID:  Tasha Hawley is a 69 y.o. female who presents for   Chief Complaint   Patient presents with    Spot     Face. Several months,      She had a spot on her left cheek she states peeled off, now with on on right also.  She has a cyst on her right upper back for several years.     Spot - Initial  Affected locations: face  Signs / symptoms: asymptomatic      Review of Systems   Constitutional:  Negative for fever, chills, weight loss, weight gain, fatigue, night sweats and malaise.   Skin:  Negative for activity-related sunscreen use, sensitivity to antibiotic ointment and wears hat.   Hematologic/Lymphatic: Bruises/bleeds easily.       Objective:   Physical Exam   Constitutional: She appears well-developed and well-nourished.   Neurological: She is alert and oriented to person, place, and time.   Psychiatric: She has a normal mood and affect.   Skin:   Areas Examined (abnormalities noted in diagram):   Head / Face Inspection Performed  Back Inspection Performed                 Diagram Legend     Erythematous scaling macule/papule c/w actinic keratosis       Vascular papule c/w angioma      Pigmented verrucoid papule/plaque c/w seborrheic keratosis      Yellow umbilicated papule c/w sebaceous hyperplasia      Irregularly shaped tan macule c/w lentigo     1-2 mm smooth white papules consistent with Milia      Movable subcutaneous cyst with punctum c/w epidermal inclusion cyst      Subcutaneous movable cyst c/w pilar cyst      Firm pink to brown papule c/w dermatofibroma      Pedunculated fleshy papule(s) c/w skin tag(s)      Evenly pigmented macule c/w junctional nevus     Mildly variegated pigmented, slightly irregular-bordered macule c/w mildly atypical nevus      Flesh colored to evenly pigmented papule c/w intradermal nevus       Pink pearly papule/plaque c/w basal cell carcinoma      Erythematous hyperkeratotic cursted plaque c/w SCC      Surgical scar with no sign of skin cancer  recurrence      Open and closed comedones      Inflammatory papules and pustules      Verrucoid papule consistent consistent with wart     Erythematous eczematous patches and plaques     Dystrophic onycholytic nail with subungual debris c/w onychomycosis     Umbilicated papule    Erythematous-base heme-crusted tan verrucoid plaque consistent with inflamed seborrheic keratosis     Erythematous Silvery Scaling Plaque c/w Psoriasis     See annotation      Assessment / Plan:        Actinic keratosis  Cryosurgery procedure note:    Verbal consent from the patient is obtained including recurrence of lesion. Liquid nitrogen cryosurgery is applied to 1 lesion to produce a freeze injury.  is instructed to call if lesion does not completely resolve.      Sebaceous cyst  Call if desires removal   Brochure provided               Follow up in about 1 year (around 12/27/2025).

## 2024-12-30 ENCOUNTER — TELEPHONE (OUTPATIENT)
Dept: UROLOGY | Facility: CLINIC | Age: 69
End: 2024-12-30
Payer: MEDICARE

## 2024-12-30 NOTE — TELEPHONE ENCOUNTER
----- Message from Audrey sent at 12/30/2024 10:25 AM CST -----  .Type:  Needs Medical Advice    Who Called: pt    Would the patient rather a call back or a response via [facility]? Call back  Best Call Back Number: [phone]  Additional Information:     Pt stated she would like a call back regarding her leakage

## 2024-12-30 NOTE — TELEPHONE ENCOUNTER
----- Message from Audrey sent at 12/30/2024 10:25 AM CST -----  .Type:  Needs Medical Advice    Who Called: pt    Would the patient rather a call back or a response via [facility]? Call back  Best Call Back Number: [phone]  Additional Information:     Pt stated she would like a call back regarding her leakage

## 2024-12-30 NOTE — TELEPHONE ENCOUNTER
Spoke to patient and notified her again that  stated the leaking is normal and he cannot improve that at this time, also notified her to do the urine test. She understood

## 2025-01-08 ENCOUNTER — TELEPHONE (OUTPATIENT)
Dept: UROLOGY | Facility: CLINIC | Age: 70
End: 2025-01-08
Payer: MEDICARE

## 2025-01-08 NOTE — TELEPHONE ENCOUNTER
----- Message from Clara sent at 1/7/2025  4:41 PM CST -----  Type:  Sooner Apoointment Request    Caller is requesting a sooner appointment.  Caller declined first available appointment listed below.  Caller will not accept being placed on the waitlist and is requesting a message be sent to doctor.  Name of Caller: Pt   When is the first available appointment? 01/13  Symptoms:constantly urinating on self, cannot hold urine  Would the patient rather a call back or a response via [facility]? Call   Best Call Back Number:[phone]   Additional Information:

## 2025-01-08 NOTE — TELEPHONE ENCOUNTER
Returned pt's call, notified her we do not have any sooner appts that the one she has. Left my callback number.

## 2025-01-10 ENCOUNTER — PATIENT MESSAGE (OUTPATIENT)
Dept: INTERNAL MEDICINE | Facility: CLINIC | Age: 70
End: 2025-01-10
Payer: MEDICARE

## 2025-01-10 ENCOUNTER — TELEPHONE (OUTPATIENT)
Dept: INTERNAL MEDICINE | Facility: CLINIC | Age: 70
End: 2025-01-10
Payer: MEDICARE

## 2025-01-10 DIAGNOSIS — T83.511A URINARY TRACT INFECTION ASSOCIATED WITH INDWELLING URETHRAL CATHETER, INITIAL ENCOUNTER: ICD-10-CM

## 2025-01-10 DIAGNOSIS — N39.0 URINARY TRACT INFECTION ASSOCIATED WITH INDWELLING URETHRAL CATHETER, INITIAL ENCOUNTER: Primary | ICD-10-CM

## 2025-01-10 DIAGNOSIS — T83.511A URINARY TRACT INFECTION ASSOCIATED WITH INDWELLING URETHRAL CATHETER, INITIAL ENCOUNTER: Primary | ICD-10-CM

## 2025-01-10 DIAGNOSIS — N39.0 URINARY TRACT INFECTION ASSOCIATED WITH INDWELLING URETHRAL CATHETER, INITIAL ENCOUNTER: ICD-10-CM

## 2025-01-10 RX ORDER — CIPROFLOXACIN 500 MG/1
500 TABLET ORAL 2 TIMES DAILY
Qty: 14 TABLET | Refills: 0 | Status: SHIPPED | OUTPATIENT
Start: 2025-01-10 | End: 2025-01-17

## 2025-01-10 RX ORDER — CIPROFLOXACIN 500 MG/1
500 TABLET ORAL 2 TIMES DAILY
Qty: 14 TABLET | Refills: 0 | Status: SHIPPED | OUTPATIENT
Start: 2025-01-10 | End: 2025-01-10 | Stop reason: SDUPTHER

## 2025-01-10 NOTE — TELEPHONE ENCOUNTER
Please call the patient.  I will send in an antibiotic this time.  However, in the future, she should consult with her urologist because she has a complicated urinary tract d/t her catheter.    Dr. ALLAN

## 2025-01-10 NOTE — TELEPHONE ENCOUNTER
----- Message from Sandie sent at 1/10/2025  9:34 AM CST -----  Contact: Pat  [phone]  .[email or web address]     Patient is calling for Medical Advice regarding:patient has UTI    How long has patient had these symptoms:1 week    Pharmacy name and phone#:[facility]                                                  [phone]    Patient wants a call back or thru [facility]:Lou  [phone]    Comments:    Please advise patient replies from provider may take up to 48 hours.

## 2025-01-10 NOTE — TELEPHONE ENCOUNTER
----- Message from Daniel sent at 1/10/2025 12:55 PM CST -----  Who called:self     What is the request in detail:pt would for you to give her a call in regards of a uti she would like for you to send something over to the pharmacy for her to take      Can the clinic reply by [facility]?     Would the patient rather a call back or a response via [facility]?callback     Best call back number:.[phone]       Additional Information:

## 2025-01-13 ENCOUNTER — TELEPHONE (OUTPATIENT)
Dept: UROLOGY | Facility: CLINIC | Age: 70
End: 2025-01-13
Payer: MEDICARE

## 2025-01-13 NOTE — TELEPHONE ENCOUNTER
----- Message from Florencio Armstrong MD sent at 1/13/2025  8:16 AM CST -----  Urine shows possible infection await identification and sensitivity.  Complete antibiotics as prescribed.

## 2025-01-14 ENCOUNTER — TELEPHONE (OUTPATIENT)
Dept: UROLOGY | Facility: CLINIC | Age: 70
End: 2025-01-14
Payer: MEDICARE

## 2025-01-14 RX ORDER — NITROFURANTOIN 25; 75 MG/1; MG/1
100 CAPSULE ORAL 2 TIMES DAILY
Qty: 28 CAPSULE | Refills: 1 | Status: SHIPPED | OUTPATIENT
Start: 2025-01-14 | End: 2025-01-30

## 2025-01-14 NOTE — TELEPHONE ENCOUNTER
----- Message from Florencio Armstrong MD sent at 1/14/2025  3:18 PM CST -----  Patient has E coli UTI sensitive to Macrodantin, take Macrobid 100 mg twice daily for 2 weeks

## 2025-01-15 ENCOUNTER — OFFICE VISIT (OUTPATIENT)
Dept: UROLOGY | Facility: CLINIC | Age: 70
End: 2025-01-15
Payer: MEDICARE

## 2025-01-15 VITALS
BODY MASS INDEX: 35.09 KG/M2 | SYSTOLIC BLOOD PRESSURE: 105 MMHG | HEART RATE: 74 BPM | HEIGHT: 65 IN | DIASTOLIC BLOOD PRESSURE: 60 MMHG | WEIGHT: 210.63 LBS

## 2025-01-15 DIAGNOSIS — N39.3 SUI (STRESS URINARY INCONTINENCE, FEMALE): Primary | ICD-10-CM

## 2025-01-15 PROCEDURE — 99999 PR PBB SHADOW E&M-EST. PATIENT-LVL III: CPT | Mod: PBBFAC,,, | Performed by: UROLOGY

## 2025-01-15 PROCEDURE — 1159F MED LIST DOCD IN RCRD: CPT | Mod: CPTII,S$GLB,, | Performed by: UROLOGY

## 2025-01-15 PROCEDURE — 3008F BODY MASS INDEX DOCD: CPT | Mod: CPTII,S$GLB,, | Performed by: UROLOGY

## 2025-01-15 PROCEDURE — 1160F RVW MEDS BY RX/DR IN RCRD: CPT | Mod: CPTII,S$GLB,, | Performed by: UROLOGY

## 2025-01-15 PROCEDURE — 3074F SYST BP LT 130 MM HG: CPT | Mod: CPTII,S$GLB,, | Performed by: UROLOGY

## 2025-01-15 PROCEDURE — 3078F DIAST BP <80 MM HG: CPT | Mod: CPTII,S$GLB,, | Performed by: UROLOGY

## 2025-01-15 PROCEDURE — 99215 OFFICE O/P EST HI 40 MIN: CPT | Mod: S$GLB,,, | Performed by: UROLOGY

## 2025-01-15 PROCEDURE — 1100F PTFALLS ASSESS-DOCD GE2>/YR: CPT | Mod: CPTII,S$GLB,, | Performed by: UROLOGY

## 2025-01-15 PROCEDURE — 3288F FALL RISK ASSESSMENT DOCD: CPT | Mod: CPTII,S$GLB,, | Performed by: UROLOGY

## 2025-01-15 RX ORDER — SODIUM CHLORIDE 9 MG/ML
INJECTION, SOLUTION INTRAVENOUS CONTINUOUS
Status: CANCELLED | OUTPATIENT
Start: 2025-01-15

## 2025-01-15 NOTE — PROGRESS NOTES
Subjective:      Patient ID: Tasha Hawley is a 69 y.o. female.    Chief Complaint: Urinary Tract Infection    Mrs Hawley a 69-year-old female with a history of urge and stress urinary incontinence.  The patient has had his spastic bladder and has a suprapubic tube.  She is still leaking a large amount through her urethra even though she is draining through the suprapubic tube.  The patient has recently had Botox placed 3 weeks ago and is still over leaking through the urethra.  The patient leaks with stress, bending coughing and lifting.  Will place suburethral sling in effort to decrease urinary leakage and patient will continue to change suprapubic tube every 4 weeks.  The patient is having some overflow incontinence as well due to stress incontinence and urine not draining through the SP tube.  Will schedule patient on January 21st at [facility]    Urinary Tract Infection   Pertinent negatives include no chills, flank pain, frequency, hematuria, nausea, urgency, vomiting, constipation or rash.   Follow-up  This is a chronic (Urinary incontinence) problem. The current episode started more than 1 year ago. The problem occurs constantly. The problem has been unchanged. Pertinent negatives include no abdominal pain, anorexia, arthralgias, change in bowel habit, chest pain, chills, congestion, coughing, diaphoresis, fatigue, fever, headaches, joint swelling, myalgias, nausea, neck pain, numbness, rash, sore throat, swollen glands, urinary symptoms, vertigo, visual change, vomiting or weakness. Nothing aggravates the symptoms. She has tried nothing for the symptoms. The treatment provided significant relief.     Review of Systems   Constitutional:  Negative for activity change, appetite change, chills, diaphoresis, fatigue and fever.   HENT:  Negative for congestion, ear pain, hearing loss, nosebleeds, sinus pressure, sore throat and trouble swallowing.    Eyes:  Negative for pain and  visual disturbance.   Respiratory:  Negative for apnea, cough and shortness of breath.    Cardiovascular:  Negative for chest pain and leg swelling.   Gastrointestinal:  Negative for abdominal distention, abdominal pain, anal bleeding, anorexia, blood in stool, change in bowel habit, constipation, diarrhea, nausea, rectal pain and vomiting.   Endocrine: Negative for cold intolerance, heat intolerance, polydipsia, polyphagia and polyuria.   Genitourinary:  Negative for decreased urine volume, difficulty urinating, dyspareunia, dysuria, enuresis, flank pain, frequency, genital sores, hematuria, menstrual problem, pelvic pain, urgency, vaginal bleeding, vaginal discharge and vaginal pain.   Musculoskeletal:  Negative for arthralgias, back pain, joint swelling, myalgias and neck pain.   Skin:  Negative for color change, pallor and rash.   Allergic/Immunologic: Negative for environmental allergies, food allergies and immunocompromised state.   Neurological:  Negative for dizziness, vertigo, speech difficulty, weakness, numbness and headaches.   Hematological:  Negative for adenopathy. Does not bruise/bleed easily.   Psychiatric/Behavioral: Negative.        Objective:     Physical Exam  Vitals and nursing note reviewed.   Constitutional:       General: She is not in acute distress.     Appearance: Normal appearance. She is well-developed. She is obese. She is not ill-appearing, toxic-appearing or diaphoretic.   HENT:      Head: Normocephalic and atraumatic.      Right Ear: External ear normal.      Left Ear: External ear normal.      Nose: Nose normal.      Mouth/Throat:      Mouth: Mucous membranes are moist.      Pharynx: Oropharynx is clear. No oropharyngeal exudate or posterior oropharyngeal erythema.   Eyes:      General: No scleral icterus.        Right eye: No discharge.         Left eye: No discharge.      Extraocular Movements: Extraocular movements intact.      Conjunctiva/sclera: Conjunctivae normal.       Pupils: Pupils are equal, round, and reactive to light.   Cardiovascular:      Rate and Rhythm: Normal rate and regular rhythm.      Heart sounds: Normal heart sounds.   Pulmonary:      Effort: Pulmonary effort is normal.   Abdominal:      General: Bowel sounds are normal. There is no distension.      Palpations: Abdomen is soft. There is no mass.      Tenderness: There is no abdominal tenderness. There is no right CVA tenderness, left CVA tenderness, guarding or rebound.      Hernia: No hernia is present.   Genitourinary:     General: Normal vulva.      Rectum: Normal.      Comments: Patient with no CVA tenderness, normal penis and scrotum.  Bladder nontender.  Musculoskeletal:         General: Normal range of motion.      Cervical back: Normal range of motion and neck supple.   Skin:     General: Skin is warm and dry.      Capillary Refill: Capillary refill takes 2 to 3 seconds.   Neurological:      General: No focal deficit present.      Mental Status: She is alert and oriented to person, place, and time.      Deep Tendon Reflexes: Reflexes are normal and symmetric.   Psychiatric:         Behavior: Behavior normal.         Thought Content: Thought content normal.         Judgment: Judgment normal.        Assessment:      1. JUNAID (stress urinary incontinence, female)      Plan:     Patient Instructions   Schedule patient for suburethral sling and cystoscopy at [facility] on 01/21/2025.  Patient to start Macrodantin twice daily  Will need Rocephin preoperatively.

## 2025-01-15 NOTE — PATIENT INSTRUCTIONS
Schedule patient for suburethral sling and cystoscopy at [facility] on 01/21/2025.  Patient to start Macrodantin twice daily  Will need Rocephin preoperatively.

## 2025-01-23 ENCOUNTER — DOCUMENT SCAN (OUTPATIENT)
Dept: HOME HEALTH SERVICES | Facility: HOSPITAL | Age: 70
End: 2025-01-23
Payer: MEDICARE

## 2025-01-30 PROBLEM — N39.3 SUI (STRESS URINARY INCONTINENCE, FEMALE): Status: ACTIVE | Noted: 2025-01-30

## 2025-02-06 ENCOUNTER — DOCUMENT SCAN (OUTPATIENT)
Dept: HOME HEALTH SERVICES | Facility: HOSPITAL | Age: 70
End: 2025-02-06
Payer: MEDICARE

## 2025-02-11 NOTE — ASSESSMENT & PLAN NOTE
[facility]  [facility] Department of Endocrinology Diabetes and Metabolism   [address], [place], [place], [address]  Phone: [phone]  Fax: [phone]    Date of Service: 2025    Primary Care Physician: Matilde Montesinos FNP  Referring physician: No ref. provider found  Provider: TAJ James - CNS     Reason for the visit:  Metabolic syndrome/Insulin resistance       History of Present Illness:  The history is provided by the patient. No  was used. Accuracy of the patient data is excellent.  Evangelina Gomes is a very pleasant 52 y.o. female seen today for metabolic syndrome    1st dx: Metabolic syndrome/Insulin resistance 2023  Context: Dx on BW  Unfortunately I have no BW available   Pt complaining of: Fatigue and weight gain.  Also complaints of chin hair that she shaves every other day   Post menopausal   She was giving samples of ozempic and has been taking 0.5 mg once weekly (Started in ) Insurance would not approve it and samples were given   Stopped Ozempic d/t headaches approximately 3 weeks ago  Could not tolerate Metformin   Labs: Total testosterone 29, free testosterone 3.9, bioavailable testosterone 7.6, TSH 2.4, cortisol 19.2, DHEA-S 237 ()  Exercise: NO frequent exercise   Diet: 3 meals per day     Hemoglobin A1C   Date Value Ref Range Status   2025 5.6 % Final         PAST MEDICAL HISTORY   Past Medical History:   Diagnosis Date    Basal cell carcinoma     Fibromyalgia     Insulin resistance     Kidney stones     Migraines     PCOS (polycystic ovarian syndrome)        PAST SURGICAL HISTORY   Past Surgical History:   Procedure Laterality Date    BREAST SURGERY      clogged ducts     SECTION      HYSTERECTOMY (CERVIX STATUS UNKNOWN)      KIDNEY STONE REMOVAL      LITHOTRIPSY      SKIN CANCER EXCISION      TUBAL LIGATION         SOCIAL HISTORY   Tobacco:   reports that she has been smoking cigarettes. She has never  · TSH 51.871  · T4 0.75  · Synthroid increased to 150 mg/d

## 2025-02-25 ENCOUNTER — OFFICE VISIT (OUTPATIENT)
Dept: UROLOGY | Facility: CLINIC | Age: 70
End: 2025-02-25
Payer: MEDICARE

## 2025-02-25 VITALS
BODY MASS INDEX: 29.5 KG/M2 | HEART RATE: 65 BPM | DIASTOLIC BLOOD PRESSURE: 64 MMHG | SYSTOLIC BLOOD PRESSURE: 96 MMHG | WEIGHT: 177.25 LBS

## 2025-02-25 DIAGNOSIS — G47.01 INSOMNIA DUE TO MEDICAL CONDITION: Chronic | ICD-10-CM

## 2025-02-25 DIAGNOSIS — M79.672 CHRONIC PAIN OF BOTH FEET: ICD-10-CM

## 2025-02-25 DIAGNOSIS — N39.3 SUI (STRESS URINARY INCONTINENCE, FEMALE): ICD-10-CM

## 2025-02-25 DIAGNOSIS — N31.9 NEUROGENIC BLADDER: ICD-10-CM

## 2025-02-25 DIAGNOSIS — E78.2 MIXED HYPERLIPIDEMIA: ICD-10-CM

## 2025-02-25 DIAGNOSIS — R52 INTRACTABLE PAIN: Primary | ICD-10-CM

## 2025-02-25 DIAGNOSIS — N32.81 DETRUSOR OVERACTIVITY: ICD-10-CM

## 2025-02-25 DIAGNOSIS — N39.46 MIXED STRESS AND URGE URINARY INCONTINENCE: Primary | ICD-10-CM

## 2025-02-25 DIAGNOSIS — E27.40 ADRENAL INSUFFICIENCY: ICD-10-CM

## 2025-02-25 DIAGNOSIS — M79.671 CHRONIC PAIN OF BOTH FEET: ICD-10-CM

## 2025-02-25 DIAGNOSIS — K21.9 GASTROESOPHAGEAL REFLUX DISEASE, UNSPECIFIED WHETHER ESOPHAGITIS PRESENT: ICD-10-CM

## 2025-02-25 DIAGNOSIS — G89.29 CHRONIC PAIN OF BOTH FEET: ICD-10-CM

## 2025-02-25 DIAGNOSIS — R52 INTRACTABLE PAIN: ICD-10-CM

## 2025-02-25 DIAGNOSIS — Z93.59 SUPRAPUBIC CATHETER: Chronic | ICD-10-CM

## 2025-02-25 DIAGNOSIS — N31.9 NEUROGENIC BLADDER: Chronic | ICD-10-CM

## 2025-02-25 PROCEDURE — 3288F FALL RISK ASSESSMENT DOCD: CPT | Mod: HCNC,CPTII,S$GLB, | Performed by: UROLOGY

## 2025-02-25 PROCEDURE — 3074F SYST BP LT 130 MM HG: CPT | Mod: HCNC,CPTII,S$GLB, | Performed by: UROLOGY

## 2025-02-25 PROCEDURE — 99999 PR PBB SHADOW E&M-EST. PATIENT-LVL IV: CPT | Mod: PBBFAC,HCNC,, | Performed by: UROLOGY

## 2025-02-25 PROCEDURE — 3078F DIAST BP <80 MM HG: CPT | Mod: HCNC,CPTII,S$GLB, | Performed by: UROLOGY

## 2025-02-25 PROCEDURE — 1125F AMNT PAIN NOTED PAIN PRSNT: CPT | Mod: HCNC,CPTII,S$GLB, | Performed by: UROLOGY

## 2025-02-25 PROCEDURE — 1100F PTFALLS ASSESS-DOCD GE2>/YR: CPT | Mod: HCNC,CPTII,S$GLB, | Performed by: UROLOGY

## 2025-02-25 PROCEDURE — 99024 POSTOP FOLLOW-UP VISIT: CPT | Mod: HCNC,S$GLB,, | Performed by: UROLOGY

## 2025-02-25 PROCEDURE — 1159F MED LIST DOCD IN RCRD: CPT | Mod: HCNC,CPTII,S$GLB, | Performed by: UROLOGY

## 2025-02-25 PROCEDURE — 1160F RVW MEDS BY RX/DR IN RCRD: CPT | Mod: HCNC,CPTII,S$GLB, | Performed by: UROLOGY

## 2025-02-25 RX ORDER — HYDROCORTISONE 5 MG/1
TABLET ORAL
Qty: 90 TABLET | Refills: 0 | OUTPATIENT
Start: 2025-02-25

## 2025-02-25 RX ORDER — ATORVASTATIN CALCIUM 80 MG/1
TABLET, FILM COATED ORAL
Qty: 90 TABLET | Refills: 0 | OUTPATIENT
Start: 2025-02-25

## 2025-02-25 RX ORDER — PANTOPRAZOLE SODIUM 40 MG/1
TABLET, DELAYED RELEASE ORAL
Qty: 90 TABLET | Refills: 0 | OUTPATIENT
Start: 2025-02-25

## 2025-02-25 RX ORDER — DARIFENACIN 7.5 MG/1
TABLET, EXTENDED RELEASE ORAL
Qty: 90 TABLET | Refills: 0 | OUTPATIENT
Start: 2025-02-25

## 2025-02-25 RX ORDER — QUETIAPINE FUMARATE 25 MG/1
TABLET, FILM COATED ORAL
Qty: 90 TABLET | Refills: 0 | OUTPATIENT
Start: 2025-02-25

## 2025-02-25 RX ORDER — FUROSEMIDE 40 MG/1
TABLET ORAL
Qty: 90 TABLET | Refills: 0 | OUTPATIENT
Start: 2025-02-25

## 2025-02-25 RX ORDER — TRAZODONE HYDROCHLORIDE 300 MG/1
TABLET ORAL
Qty: 45 TABLET | Refills: 0 | OUTPATIENT
Start: 2025-02-25

## 2025-02-25 RX ORDER — AMITRIPTYLINE HYDROCHLORIDE 25 MG/1
TABLET, FILM COATED ORAL
Qty: 90 TABLET | Refills: 0 | OUTPATIENT
Start: 2025-02-25

## 2025-02-25 RX ORDER — HYDROCORTISONE 10 MG/1
TABLET ORAL
Qty: 90 TABLET | Refills: 0 | OUTPATIENT
Start: 2025-02-25

## 2025-02-25 RX ORDER — DULOXETIN HYDROCHLORIDE 60 MG/1
CAPSULE, DELAYED RELEASE ORAL
Qty: 180 CAPSULE | Refills: 0 | OUTPATIENT
Start: 2025-02-25

## 2025-02-25 RX ORDER — QUETIAPINE FUMARATE 100 MG/1
TABLET, FILM COATED ORAL
Qty: 90 TABLET | Refills: 0 | OUTPATIENT
Start: 2025-02-25

## 2025-02-25 NOTE — PROGRESS NOTES
Tasha Hawley is a 69 y.o. female patient.   No diagnosis found.  Past Medical History:   Diagnosis Date    Anxiety     Arthritis     Bilateral lower extremity edema     severe chronic/lymphedema    Carotid artery occlusion 2012    39% stenosis to right and left    Cataract     chart states left cataract sx but pt is unsure    Cellulitis of left lower extremity 11/09/2024    pt states resolved    CHF (congestive heart failure)     Chronic pain syndrome     Constipation     Continuous leakage of urine     has suprapubic catheter    Coronary artery disease     subtotalled LAD with collateral    Depression     Detrusor overactivity     Encounter for blood transfusion     no reaction    Fever blister     GERD (gastroesophageal reflux disease)     Hard of hearing     Hyperlipidemia     Hypokalemia 01/09/2023    Hyponatremia 02/04/2022    Hypothyroid     Insomnia     Iron deficiency anemia     Lumbar radiculopathy     with chronic pain    Mixed stress and urge urinary incontinence     has suprapubic catheter    Neurogenic bladder     Ocular migraine     Other emphysema 05/22/2023    Patient has cystostomy     Renal disorder     Sleep apnea     no cpap    Yeast infection     candida in urine     Past Surgical History Pertinent Negatives:   Procedure Date Noted    SINUS SURGERY 12/04/2018    TYMPANOSTOMY TUBE PLACEMENT 12/04/2018     Scheduled Meds:  Continuous Infusions:  PRN Meds:    Review of patient's allergies indicates:   Allergen Reactions    (d)-limonene flavor      Other reaction(s): difficult intubation  Other reaction(s): Difficulty breathing    Benadryl [diphenhydramine hcl] Shortness Of Breath    Fentanyl Itching, Nausea And Vomiting and Swelling             Imitrex [sumatriptan succinate] Shortness Of Breath    Oxycodone-acetaminophen Shortness Of Breath    Sulfamethoxazole-trimethoprim Anaphylaxis    Topiramate Shortness Of Breath    Vancomycin Anaphylaxis      Rash    Butorphanol tartrate     Darvocet a500 [propoxyphene n-acetaminophen]      Other reaction(s): Difficulty breathing    Evening primrose (oenothera biennis)     Latex     Pregabalin Other (See Comments)     tremors    Sumatriptan     White petrolatum-zinc     Zinc acetate     Zinc oxide-white petrolatum      Other reaction(s): Difficulty breathing    Latex, natural rubber Itching and Rash    Phenytoin Rash and Other (See Comments)     Trouble breathing     There are no hospital problems to display for this patient.    Blood pressure 96/64, pulse 65, weight 80.4 kg (177 lb 4 oz).    Subjective:   Diet: Adequate intake.    Activity level: Normal.    Pain control: Well controlled.    Objective:  Vital signs (most recent): Blood pressure 96/64, pulse 65, weight 80.4 kg (177 lb 4 oz).  General appearance: Comfortable, well-appearing, in no acute distress and not in pain.    Lungs:  Normal effort.    Heart: Normal rate.    Chest: Symmetric chest wall expansion.    Abdomen: Abdomen is soft.    Bowel sounds:  Bowel sounds are normal.    Tenderness: There is no abdominal tenderness tenderness.    Wound:  Clean.  There is serosanguinous drainage.    Extremities: There is normal range of motion.    Neurological: The patient is alert and oriented to person, place and time.  No tongue deviation.       Assessment:   Post-op: 26 days.    Condition: In stable condition.     Plan:  Encourage ambulation.  Regular diet.         Florencio Armstrong MD  2/25/2025

## 2025-02-25 NOTE — TELEPHONE ENCOUNTER
Refill Routing Note   Medication(s) are not appropriate for processing by [facility] for the following reason(s):        Outside of protocol    [facility] action(s):  Route               Appointments  past 12m or future 3m with PCP    Date Provider   Last Visit   11/22/2024 Mesfin Hodges II, MD   Next Visit   Visit date not found Mesfin Hodges II, MD   ED visits in past 90 days: 0        Note composed:4:36 PM 02/25/2025           14-Aug-2022 14:04

## 2025-02-25 NOTE — TELEPHONE ENCOUNTER
No care due was identified.  Health [facility] Embedded Care Due Messages. Reference number: [id number].   2/25/2025 4:13:37 PM CST

## 2025-02-25 NOTE — PATIENT INSTRUCTIONS
Should follow up in 6 months to plan repeat Botox procedure  If leakage worsens before then patient to notify me  Will arrange for patient to have home health with SP tube change every 4 weeks and bag change every week

## 2025-02-25 NOTE — TELEPHONE ENCOUNTER
Refill Routing Note   Medication(s) are not appropriate for processing by [facility] for the following reason(s):        Outside of protocol    [facility] action(s):  Route  Quick Discontinue        Medication Therapy Plan: Medications refused due to error in request and pended manually.; Pended meds to new valid encounters    Pharmacist review requested: Yes     Appointments  past 12m or future 3m with PCP    Date Provider   Last Visit   11/22/2024 Mesfin Hodges II, MD   Next Visit   2/25/2025 Mesfin Hodges II, MD   ED visits in past 90 days: 0        Note composed:4:42 PM 02/25/2025

## 2025-02-25 NOTE — TELEPHONE ENCOUNTER
No care due was identified.  Health [facility] Embedded Care Due Messages. Reference number: [id number].   2/25/2025 4:43:08 PM CST

## 2025-02-25 NOTE — TELEPHONE ENCOUNTER
Refill Routing Note   Medication(s) are not appropriate for processing by [facility] for the following reason(s):        Outside of protocol    [facility] action(s):  Route               Appointments  past 12m or future 3m with PCP    Date Provider   Last Visit   11/22/2024 Mesfin Hodges II, MD   Next Visit   Visit date not found Mesfin Hodges II, MD   ED visits in past 90 days: 0        Note composed:4:37 PM 02/25/2025

## 2025-02-25 NOTE — TELEPHONE ENCOUNTER
No care due was identified.  Health [facility] Embedded Care Due Messages. Reference number: [id number].   2/25/2025 4:38:53 PM CST

## 2025-02-25 NOTE — TELEPHONE ENCOUNTER
No care due was identified.  [facility] Embedded Care Due Messages. Reference number: [id number].   2/25/2025 4:25:50 PM CST

## 2025-02-25 NOTE — TELEPHONE ENCOUNTER
Refill Routing Note   Medication(s) are not appropriate for processing by [facility] for the following reason(s):        Outside of protocol: duplicate    [facility] action(s):  Route  Quick Discontinue    Refill Center cannot discontinue controlled medications     Refused medications: Pharmacies have been requesting medications for patients without required information, (sig, frequency, qty, etc.). In addition, requests are sent for medication(s) pt. are currently not taking, and medications patients have never taken.      We have spoken to the pharmacies about these request types and advised their teams previously that we are unable to assess these New Script requests and require all details for these requests. This is a known issue and has been reported.         Pharmacist review requested: Yes     Appointments  past 12m or future 3m with PCP    Date Provider   Last Visit   11/22/2024 Mesfin Hodges II, MD   Next Visit   2/25/2025 Mesfin Hodges II, MD   ED visits in past 90 days: 0        Note composed:5:26 PM 02/25/2025

## 2025-02-26 RX ORDER — HYDROCORTISONE 5 MG/1
5 TABLET ORAL
Qty: 90 TABLET | Refills: 3 | Status: SHIPPED | OUTPATIENT
Start: 2025-02-26 | End: 2026-02-21

## 2025-02-26 RX ORDER — DULOXETIN HYDROCHLORIDE 60 MG/1
60 CAPSULE, DELAYED RELEASE ORAL 2 TIMES DAILY
Qty: 180 CAPSULE | Refills: 3 | Status: SHIPPED | OUTPATIENT
Start: 2025-02-26

## 2025-02-26 RX ORDER — TRAZODONE HYDROCHLORIDE 300 MG/1
TABLET ORAL
Qty: 45 TABLET | Refills: 2 | Status: SHIPPED | OUTPATIENT
Start: 2025-02-26

## 2025-02-26 RX ORDER — AMITRIPTYLINE HYDROCHLORIDE 25 MG/1
25 TABLET, FILM COATED ORAL NIGHTLY
Qty: 90 TABLET | Refills: 2 | Status: SHIPPED | OUTPATIENT
Start: 2025-02-26

## 2025-02-26 RX ORDER — FUROSEMIDE 40 MG/1
40 TABLET ORAL DAILY
Qty: 90 TABLET | Refills: 2 | Status: SHIPPED | OUTPATIENT
Start: 2025-02-26

## 2025-02-26 RX ORDER — TRAMADOL HYDROCHLORIDE 50 MG/1
TABLET ORAL
Qty: 270 TABLET | Refills: 0 | OUTPATIENT
Start: 2025-02-26

## 2025-02-26 RX ORDER — DARIFENACIN 7.5 MG/1
7.5 TABLET, EXTENDED RELEASE ORAL DAILY
Qty: 90 TABLET | Refills: 3 | Status: SHIPPED | OUTPATIENT
Start: 2025-02-26

## 2025-02-26 RX ORDER — ATORVASTATIN CALCIUM 80 MG/1
80 TABLET, FILM COATED ORAL DAILY
Qty: 90 TABLET | Refills: 2 | Status: SHIPPED | OUTPATIENT
Start: 2025-02-26

## 2025-02-26 RX ORDER — QUETIAPINE FUMARATE 25 MG/1
25 TABLET, FILM COATED ORAL DAILY
Qty: 90 TABLET | Refills: 3 | Status: SHIPPED | OUTPATIENT
Start: 2025-02-26 | End: 2026-02-26

## 2025-02-26 RX ORDER — QUETIAPINE FUMARATE 100 MG/1
100 TABLET, FILM COATED ORAL NIGHTLY
Qty: 90 TABLET | Refills: 3 | Status: SHIPPED | OUTPATIENT
Start: 2025-02-26 | End: 2026-02-21

## 2025-02-26 RX ORDER — TRAMADOL HYDROCHLORIDE 50 MG/1
50 TABLET ORAL EVERY 12 HOURS PRN
Qty: 60 TABLET | Refills: 0 | Status: SHIPPED | OUTPATIENT
Start: 2025-02-26

## 2025-02-26 RX ORDER — PANTOPRAZOLE SODIUM 40 MG/1
40 TABLET, DELAYED RELEASE ORAL DAILY
Qty: 90 TABLET | Refills: 2 | Status: SHIPPED | OUTPATIENT
Start: 2025-02-26

## 2025-02-26 RX ORDER — HYDROCORTISONE 10 MG/1
10 TABLET ORAL DAILY
Qty: 90 TABLET | Refills: 3 | Status: SHIPPED | OUTPATIENT
Start: 2025-02-26 | End: 2026-02-21

## 2025-02-26 NOTE — TELEPHONE ENCOUNTER
Refill Routing Note   Medication(s) are not appropriate for processing by [facility] for the following reason(s):        No active prescription written by provider    [facility] action(s):  Defer  Approve             Appointments  past 12m or future 3m with PCP    Date Provider   Last Visit   11/22/2024 Mesfin Hodges II, MD   Next Visit   Visit date not found Mesfin Hodges II, MD   ED visits in past 90 days: 0        Note composed:12:13 PM 02/26/2025

## 2025-02-28 ENCOUNTER — TELEPHONE (OUTPATIENT)
Dept: UROLOGY | Facility: CLINIC | Age: 70
End: 2025-02-28
Payer: MEDICARE

## 2025-02-28 DIAGNOSIS — Z93.59 SUPRAPUBIC CATHETER: Primary | ICD-10-CM

## 2025-02-28 NOTE — TELEPHONE ENCOUNTER
Dr. Armstrong wishes to restart home health orders for patient. I called previous agency[facility], and spoke with Kalina. I told her I would enter new HH orders and fax them over to her ([phone]). Orders entered in [facility], also faxed to [facility] at above fax number. Fax confirmation received.

## 2025-02-28 NOTE — TELEPHONE ENCOUNTER
----- Message from Gracie sent at 2/28/2025 10:27 AM CST -----  Regarding: Call back  Contact: [phone]  Type:  Needs Medical AdviceWho Called: PT Symptoms (please be specific): meadows is leaking and patient is concern and discomfort How long has patient had these symptoms:  3 days Would the patient rather a call back or a response via [facility]? Call back Best Call Back Number: [phone] Information:

## 2025-02-28 NOTE — TELEPHONE ENCOUNTER
I returned pt's call and clarified that the SPT is not leaking, she is having incontinence. She was in the clinic 3 days ago and reported the leaking had stopped since getting the sling, she reports now the leaking has returned. Patient was instructed at last visit to notify provider of increased leakage. Please advise.

## 2025-03-03 ENCOUNTER — OCHSNER VIRTUAL EMERGENCY DEPARTMENT (OUTPATIENT)
Facility: CLINIC | Age: 70
End: 2025-03-03
Payer: MEDICARE

## 2025-03-03 ENCOUNTER — PATIENT OUTREACH (OUTPATIENT)
Facility: OTHER | Age: 70
End: 2025-03-03
Payer: MEDICARE

## 2025-03-03 ENCOUNTER — NURSE TRIAGE (OUTPATIENT)
Dept: ADMINISTRATIVE | Facility: CLINIC | Age: 70
End: 2025-03-03
Payer: MEDICARE

## 2025-03-03 DIAGNOSIS — T83.010A SUPRAPUBIC CATHETER DYSFUNCTION, INITIAL ENCOUNTER: Primary | ICD-10-CM

## 2025-03-03 DIAGNOSIS — N31.9 NEUROGENIC BLADDER: Primary | Chronic | ICD-10-CM

## 2025-03-03 NOTE — PROGRESS NOTES
Patient spoke with [facility] RN on 3/3/2025 with complaint of suprapubic catheter,leaking urine, dark/bloody urine in bag.  [facility] RN consulted with Al provider, Dr. Fabian and disposition recommended was urgent care.    Will follow up with patient to see if she received the care she was needing.

## 2025-03-03 NOTE — TELEPHONE ENCOUNTER
Patient requesting to see Dr Armstrong, urology. Patient has a suprapubic catheter and she is leaking urine and has dark/bloody urine in the bag. She also has some lower abdominal pressure. Dispo - go to office now. Unable to schedule with specialty. Sent secure chat to [facility] and chart reviewed by Dr. Fabian. He advised patient could be seen in . Patient prefers to talk with Dr Armstrong's office. Will route message with high priority but advised if she does not hear from them within 1 hour she should go to  for eval. Instructed to call back with additional questions or worsening of symptoms. Patient verbalized understanding.     Reason for Disposition   Lower abdominal pain or distention    Additional Information   Negative: Shock suspected (e.g., cold/pale/clammy skin, too weak to stand, low BP, rapid pulse)   Negative: Sounds like a life-threatening emergency to the triager   Negative: Catheter was accidentally pulled-out and bright red continuous bleeding   Negative: SEVERE abdominal pain   Negative: Fever > 100.4 F (38.0 C)   Negative: Drinking very little and dehydration suspected (e.g., no urine > 12 hours, very dry mouth, very lightheaded)   Negative: Patient sounds very sick or weak to the triager   Negative: Catheter was accidentally pulled-out   Negative: Bleeding around catheter (e.g., from penis or female urethra)    Protocols used: Urinary Catheter Symptoms and [id number]-TP

## 2025-03-03 NOTE — PROGRESS NOTES
Orders placed for spine surgery to determine the degree of her spinal instability, if she can be placed into dorsal lithotomy position.  Need a statement from them if she can have pelvic surgery or if she requires treatment or special precautions.   
no

## 2025-03-03 NOTE — TELEPHONE ENCOUNTER
I called patient one hour ago in regards to her message on Friday regarding leaking. I called patient back and notified her of Dr. Armstrong's recommendation to submit urine sample but there was no other intervention other than to keep taking bladder medication. I placed an order for ua reflex to culture as Dr. Armstrong instructed.

## 2025-03-03 NOTE — PLAN OF CARE-OVED
[facility] Plan of Care Note  Referral Source: Nurse On-Call                               Chief Complaint   Patient presents with    Urinary Pressure     Patient has a suprapubic catheter and she is leaking urine and has dark/bloody urine in the bag. She also has some lower abdominal pressure.       Recommendation: Urgent Care               Also attempting to message urology (Dr. Armstrong) for appt scheduling.               Encounter Diagnosis   Name Primary?    Suprapubic catheter dysfunction, initial encounter Yes

## 2025-03-03 NOTE — PROGRESS NOTES
The patient location is: home  The chief complaint leading to consultation is: recurrent UTI    Visit type: audio only    Time with patient:  20 minutes of total time spent on the encounter, which includes face to face time and non-face to face time preparing to see the patient (eg, review of tests), obtaining and/or reviewing separately obtained history, documenting clinical information in the electronic or other health record, independently interpreting results (not separately reported) and communicating results to the patient/family/caregiver, or care coordination (not separately reported).     Each patient to whom he or she provides medical services by telemedicine is:  (1) informed of the relationship between the physician and patient and the respective role of any other health care provider with respect to management of the patient; and (2) notified that he or she may decline to receive medical services by telemedicine and may withdraw from such care at any time.      CHIEF COMPLAINT:    Mrs. Hawley is a 66 y.o. female presenting for a follow up    PRESENTING ILLNESS:    Tasha Hawley is a 66 y.o. female who states that she has been having diarrhea.  She complains that the suprapubic tube gives her pressure and the urine is more yellow than normal.  This makes her concerned for UTI.  No fevers or chills.  However, she intermittently does not feel well enough to leave her bed, having headaches, the diarrhea or feels debilitated. She was seen in the hospital on 4/14/2022 for dehydration, generalized body aches particularly with lower back pain.     REVIEW OF SYSTEMS:    Review of Systems   Constitutional: Negative.    HENT: Negative.    Eyes: Negative.    Respiratory: Negative.    Cardiovascular: Negative.    Gastrointestinal: Positive for diarrhea.   Genitourinary:        Pelvic pressure   Musculoskeletal: Positive for back pain.   Skin: Negative.    Neurological: Positive for headaches.    Endo/Heme/Allergies: Negative.    Psychiatric/Behavioral: Negative.        PATIENT HISTORY:    Past Medical History:   Diagnosis Date    Anticoagulant long-term use     Anxiety     Arthritis     Bilateral lower extremity edema     severe chronic    Carotid artery occlusion     Cataract     CHF (congestive heart failure)     Coronary artery disease     subtotalled LAD with collateral    Depression     Fever blister     Hypothyroid     Iron deficiency anemia     Lumbar radiculopathy     with chronic pain    Ocular migraine     Renal disorder     Sleep apnea     cpap       Past Surgical History:   Procedure Laterality Date    ADENOIDECTOMY      BACK SURGERY      x 12    CARDIAC CATHETERIZATION  2016    subtotalled LAD with right to left collaterals    CATARACT EXTRACTION W/  INTRAOCULAR LENS IMPLANT Left     Dr Coleman     CYSTOSCOPIC LITHOLAPAXY N/A 6/27/2019    Procedure: CYSTOLITHOLAPAXY;  Surgeon: Shireen Mayo MD;  Location: [facility] OR 2ND FLR;  Service: Urology;  Laterality: N/A;    CYSTOSCOPIC LITHOLAPAXY N/A 9/3/2019    Procedure: CYSTOLITHOLAPAXY;  Surgeon: Shireen Mayo MD;  Location: [facility] OR 2ND FLR;  Service: Urology;  Laterality: N/A;    CYSTOSCOPY N/A 7/13/2021    Procedure: CYSTOSCOPY;  Surgeon: Shireen Mayo MD;  Location: [facility] OR 1ST FLR;  Service: Urology;  Laterality: N/A;    CYSTOSCOPY  11/16/2021    Procedure: CYSTOSCOPY;  Surgeon: Shireen Mayo MD;  Location: [facility] OR 1ST FLR;  Service: Urology;;    ESOPHAGOGASTRODUODENOSCOPY N/A 5/23/2018    Procedure: ESOPHAGOGASTRODUODENOSCOPY (EGD);  Surgeon: Prince Vance MD;  Location: [facility] (4TH FLR);  Service: Endoscopy;  Laterality: N/A;  r/s 'd per Dr. Vance due to family emergency- ER    HYSTERECTOMY  1975    endometriosis    INJECTION OF BOTULINUM TOXIN TYPE A  7/13/2021    Procedure: INJECTION, BOTULINUM TOXIN, 200units;  Surgeon: Shireen Mayo MD;  Location: [facility] OR [employer];  Service: Urology;;     INJECTION OF BOTULINUM TOXIN TYPE A  11/16/2021    Procedure: INJECTION, BOTULINUM TOXIN, 200units;  Surgeon: Shireen Mayo MD;  Location: [facility] OR [address] [place] [address];  Service: Urology;;    pain pump placement      SQ Dilaudid Pump managed by Dr. Hillman, Pain Management    REPLACEMENT OF CATHETER N/A 10/31/2019    Procedure: REPLACEMENT, CATHETER-SUPRAPUBIC;  Surgeon: Shireen Mayo MD;  Location: [facility] OR [address] [place] [address];  Service: Urology;  Laterality: N/A;    SPINAL CORD STIMULATOR REMOVAL      before [place]    SPINE SURGERY  5-13-13    CERVICAL FUSION    TONSILLECTOMY         Family History   Problem Relation Age of Onset    Cancer Mother 55        breast    Cancer Father         esophagus,had laryngectomy    Esophageal cancer Father     Parkinsonism Maternal Grandmother     Tremor Maternal Grandmother     No Known Problems Brother     No Known Problems Brother     Heart disease Maternal Uncle     Colon cancer Maternal Uncle         Less than 60    No Known Problems Sister     No Known Problems Maternal Aunt     Cirrhosis Paternal Aunt         ETOH    Liver disease Paternal Aunt         ETOH    Liver disease Paternal Uncle         ETOH    Cirrhosis Paternal Uncle         ETOH       Social History     Socioeconomic History    Marital status:    Tobacco Use    Smoking status: Never Smoker    Smokeless tobacco: Never Used   Substance and Sexual Activity    Alcohol use: Never    Drug use: No    Sexual activity: Never     Partners: Male     Social Determinants of Health     Financial Resource Strain: Low Risk     Difficulty of Paying Living Expenses: Not very hard   Food Insecurity: No Food Insecurity    Worried About Running Out of Food in the Last Year: Never true    Ran Out of Food in the Last Year: Never true   Transportation Needs: No Transportation Needs    Lack of Transportation (Medical): No    Lack of Transportation (Non-Medical): No   Physical Activity: Inactive    Days of  Exercise per Week: 0 days    Minutes of Exercise per Session: 0 min   Housing Stability: Low Risk     Unable to Pay for Housing in the Last Year: No    Number of Places Lived in the Last Year: 1    Unstable Housing in the Last Year: No       Allergies:  (d)-limonene flavor; Bactrim [sulfamethoxazole-trimethoprim]; Benadryl [diphenhydramine hcl]; Fentanyl; Imitrex [sumatriptan succinate]; Topamax [topiramate]; Vancomycin; Butorphanol tartrate; Darvocet a500 [propoxyphene n-acetaminophen]; White petrolatum-zinc; Zinc oxide-white petrolatum; Latex, natural rubber; and Phenytoin    Medications:  Outpatient Encounter Medications as of 5/16/2022   Medication Sig Dispense Refill    aspirin (ECOTRIN) 81 MG EC tablet Take 1 tablet (81 mg total) by mouth once daily.  0    atorvastatin (LIPITOR) 80 MG tablet TAKE ONE TABLET BY MOUTH EVERY DAY 90 tablet 3    butalbital-acetaminophen-caffeine -40 mg (FIORICET, ESGIC) -40 mg per tablet Take 1 tablet by mouth daily as needed. 15 tablet 0    FLUoxetine 20 MG capsule Take 3 capsules (60 mg total) by mouth once daily. 270 capsule 1    HYDROcodone-acetaminophen (NORCO)  mg per tablet       intrathecal pain pump compound Hydromorphone (7.5 mg/mL) infusion at 3.6799 mg/day (0.1533 mg/hr) out of a total reservoir volume of 37.3 mL  Pump filled every 2 months      levothyroxine (SYNTHROID) 125 MCG tablet Take 1 tablet (125 mcg total) by mouth before breakfast. 90 tablet 3    lidocaine (LIDODERM) 5 % daily as needed.       nitroGLYCERIN (NITROSTAT) 0.4 MG SL tablet Place 1 tablet (0.4 mg total) under the tongue every 5 (five) minutes as needed for Chest pain. 25 tablet 3    oxybutynin (DITROPAN XL) 15 MG TR24 Take 1 tablet (15 mg total) by mouth once daily. 90 tablet 3    pantoprazole (PROTONIX) 40 MG tablet TAKE ONE TABLET BY MOUTH EVERY DAY 90 tablet 3    potassium chloride SA (K-DUR,KLOR-CON) 20 MEQ tablet Take 1 tablet (20 mEq total) by mouth 2 (two)  times daily. 180 tablet 3    promethazine (PHENERGAN) 25 MG tablet Take 25 mg by mouth every 6 (six) hours as needed for Nausea.      QUEtiapine (SEROQUEL) 100 MG Tab TAKE 2 TABLETS (200 MG) BY MOUTH NIGHTLY 180 tablet 3    senna (SENNA LAX) 8.6 mg tablet Take 2 tablets by mouth 2 (two) times daily.      traZODone (DESYREL) 100 MG tablet Take 3 tablets (300 mg total) by mouth every evening. 270 tablet 1    [DISCONTINUED] torsemide (DEMADEX) 100 MG Tab TAKE ONE TABLET BY MOUTH EVERY DAY 90 tablet 0     No facility-administered encounter medications on file as of 5/16/2022.         PHYSICAL EXAMINATION:    The patient generally sounds in good health, is appropriately interactive, and is in no apparent distress.    Mental: Cooperative with normal affect.    Chest:  normal inspiratory effort.        LABS:    Lab Results   Component Value Date    BUN 5 (L) 04/29/2022    CREATININE 1.0 04/29/2022     She had a recent culture with an   5/12/2022 ESBL E coli.    4/15/2022 staph aureas   2/3/2022 candida  1/27/2022 pseudomonas fluorescens/putida    IMPRESSION:    Encounter Diagnoses   Name Primary?    Suprapubic pressure Yes       PLAN:    1. Will have home health change the catheter.  She is not febrile.  Discussed the color of the urine is not a reason to treat a positive culture, she will always be colonized.  I believe the reason for the different organisms is that she is treated with different antibiotics over time.   2.  Recommended that she see her primary for the diarrhea    NOTE:  Last week, the patient scheduled 3 appointments and no showed for 2 of them, and cancelled, the 3rd at the last minute.  Discussed with her  that this prevents me from seeing other patients.  I will not be able to continue seeing her if she no shows or cancels at the last minute again.        24

## 2025-03-04 ENCOUNTER — PATIENT OUTREACH (OUTPATIENT)
Facility: OTHER | Age: 70
End: 2025-03-04
Payer: MEDICARE

## 2025-03-05 NOTE — PROGRESS NOTES
Patient outreached to see if she received the care she was needing, but unable to reach or leave voicemail.

## 2025-03-06 ENCOUNTER — TELEPHONE (OUTPATIENT)
Dept: INTERNAL MEDICINE | Facility: CLINIC | Age: 70
End: 2025-03-06
Payer: MEDICARE

## 2025-03-06 ENCOUNTER — NURSE TRIAGE (OUTPATIENT)
Dept: ADMINISTRATIVE | Facility: CLINIC | Age: 70
End: 2025-03-06
Payer: MEDICARE

## 2025-03-06 DIAGNOSIS — G89.29 CHRONIC PAIN OF BOTH FEET: ICD-10-CM

## 2025-03-06 DIAGNOSIS — R52 INTRACTABLE PAIN: ICD-10-CM

## 2025-03-06 DIAGNOSIS — M79.671 CHRONIC PAIN OF BOTH FEET: ICD-10-CM

## 2025-03-06 DIAGNOSIS — M79.672 CHRONIC PAIN OF BOTH FEET: ICD-10-CM

## 2025-03-06 RX ORDER — DULOXETIN HYDROCHLORIDE 60 MG/1
60 CAPSULE, DELAYED RELEASE ORAL 2 TIMES DAILY
Qty: 180 CAPSULE | Refills: 2 | Status: SHIPPED | OUTPATIENT
Start: 2025-03-06

## 2025-03-06 NOTE — TELEPHONE ENCOUNTER
Refill Routing Note   Medication(s) are not appropriate for processing by [facility] for the following reason(s):        New or recently adjusted medication    [facility] action(s):  Defer             Appointments  past 12m or future 3m with PCP    Date Provider   Last Visit   11/22/2024 Mesfin Hodges II, MD   Next Visit   Visit date not found Mesfin Hodges II, MD   ED visits in past 90 days: 0        Note composed:2:34 PM 03/06/2025

## 2025-03-06 NOTE — TELEPHONE ENCOUNTER
Pt called for UTI symptoms and I tried several times to her number and no answer and unable to leave VM due to mailbox full. I tried husbands number and same. I hope she will call back.                Reason for Disposition   No answer.  First attempt to contact caller.  Follow-up call scheduled within 15 minutes.    Protocols used: No Contact or Duplicate Contact Call-A-OH

## 2025-03-06 NOTE — TELEPHONE ENCOUNTER
----- Message from Nancy sent at 3/6/2025  3:08 PM CST -----  Contact: Self/ [phone]  Patient is returning a phone call.Who left a message for the patient: Mimi Does patient know what this is regarding:  Would you like a call back, or a response through your [facility] portal?:   Call back Comments:

## 2025-03-06 NOTE — TELEPHONE ENCOUNTER
No care due was identified.  [facility] Embedded Care Due Messages. Reference number: [id number].   3/06/2025 12:22:22 PM CST

## 2025-03-08 ENCOUNTER — HOSPITAL ENCOUNTER (EMERGENCY)
Facility: HOSPITAL | Age: 70
Discharge: HOME | End: 2025-03-09
Attending: EMERGENCY MEDICINE
Payer: MEDICARE

## 2025-03-08 DIAGNOSIS — M54.50 ACUTE BILATERAL LOW BACK PAIN WITHOUT SCIATICA: Primary | ICD-10-CM

## 2025-03-08 LAB
ALBUMIN SERPL BCP-MCNC: 4 G/DL (ref 3.5–5.2)
ALP SERPL-CCNC: 119 U/L (ref 40–150)
ALT SERPL W/O P-5'-P-CCNC: 6 U/L (ref 10–44)
ANION GAP SERPL CALC-SCNC: 8 MMOL/L (ref 8–16)
AST SERPL-CCNC: 13 U/L (ref 10–40)
BACTERIA #/AREA URNS HPF: ABNORMAL /HPF
BASOPHILS # BLD AUTO: 0.04 K/UL (ref 0–0.2)
BASOPHILS NFR BLD: 0.4 % (ref 0–1.9)
BILIRUB SERPL-MCNC: 0.5 MG/DL (ref 0.1–1)
BILIRUB UR QL STRIP: NEGATIVE
BUN SERPL-MCNC: 22 MG/DL (ref 8–23)
CALCIUM SERPL-MCNC: 9.8 MG/DL (ref 8.7–10.5)
CHLORIDE SERPL-SCNC: 100 MMOL/L (ref 95–110)
CLARITY UR: ABNORMAL
CO2 SERPL-SCNC: 24 MMOL/L (ref 23–29)
COLOR UR: YELLOW
CREAT SERPL-MCNC: 1.3 MG/DL (ref 0.5–1.4)
DIFFERENTIAL METHOD BLD: NORMAL
EOSINOPHIL # BLD AUTO: 0.3 K/UL (ref 0–0.5)
EOSINOPHIL NFR BLD: 2.9 % (ref 0–8)
ERYTHROCYTE [DISTWIDTH] IN BLOOD BY AUTOMATED COUNT: 14.4 % (ref 11.5–14.5)
EST. GFR  (NO RACE VARIABLE): 45 ML/MIN/1.73 M^2
GLUCOSE SERPL-MCNC: 103 MG/DL (ref 70–110)
GLUCOSE UR QL STRIP: NEGATIVE
HCT VFR BLD AUTO: 39.1 % (ref 37–48.5)
HGB BLD-MCNC: 12.9 G/DL (ref 12–16)
HGB UR QL STRIP: ABNORMAL
IMM GRANULOCYTES # BLD AUTO: 0.03 K/UL (ref 0–0.04)
IMM GRANULOCYTES NFR BLD AUTO: 0.3 % (ref 0–0.5)
KETONES UR QL STRIP: NEGATIVE
LACTATE SERPL-SCNC: 1.9 MMOL/L (ref 0.5–2.2)
LEUKOCYTE ESTERASE UR QL STRIP: ABNORMAL
LYMPHOCYTES # BLD AUTO: 2 K/UL (ref 1–4.8)
LYMPHOCYTES NFR BLD: 21.8 % (ref 18–48)
MCH RBC QN AUTO: 29 PG (ref 27–31)
MCHC RBC AUTO-ENTMCNC: 33 G/DL (ref 32–36)
MCV RBC AUTO: 88 FL (ref 82–98)
MICROSCOPIC COMMENT: ABNORMAL
MONOCYTES # BLD AUTO: 0.6 K/UL (ref 0.3–1)
MONOCYTES NFR BLD: 6.6 % (ref 4–15)
NEUTROPHILS # BLD AUTO: 6.1 K/UL (ref 1.8–7.7)
NEUTROPHILS NFR BLD: 68 % (ref 38–73)
NITRITE UR QL STRIP: NEGATIVE
NRBC BLD-RTO: 0 /100 WBC
PH UR STRIP: 5 [PH] (ref 5–8)
PLATELET # BLD AUTO: 185 K/UL (ref 150–450)
PMV BLD AUTO: 10.3 FL (ref 9.2–12.9)
POTASSIUM SERPL-SCNC: 4.5 MMOL/L (ref 3.5–5.1)
PROT SERPL-MCNC: 7.5 G/DL (ref 6–8.4)
PROT UR QL STRIP: ABNORMAL
RBC # BLD AUTO: 4.45 M/UL (ref 4–5.4)
RBC #/AREA URNS HPF: >100 /HPF (ref 0–4)
SODIUM SERPL-SCNC: 132 MMOL/L (ref 136–145)
SP GR UR STRIP: 1.01 (ref 1–1.03)
SQUAMOUS #/AREA URNS HPF: 1 /HPF
URN SPEC COLLECT METH UR: ABNORMAL
UROBILINOGEN UR STRIP-ACNC: NEGATIVE EU/DL
WBC # BLD AUTO: 8.95 K/UL (ref 3.9–12.7)
WBC #/AREA URNS HPF: 88 /HPF (ref 0–5)
WBC CLUMPS URNS QL MICRO: ABNORMAL
YEAST URNS QL MICRO: ABNORMAL

## 2025-03-08 PROCEDURE — 96375 TX/PRO/DX INJ NEW DRUG ADDON: CPT | Mod: HCNC

## 2025-03-08 PROCEDURE — 85025 COMPLETE CBC W/AUTO DIFF WBC: CPT | Mod: HCNC

## 2025-03-08 PROCEDURE — 81000 URINALYSIS NONAUTO W/SCOPE: CPT | Mod: HCNC

## 2025-03-08 PROCEDURE — 83605 ASSAY OF LACTIC ACID: CPT | Mod: HCNC | Performed by: EMERGENCY MEDICINE

## 2025-03-08 PROCEDURE — 99285 EMERGENCY DEPT VISIT HI MDM: CPT | Mod: 25,HCNC

## 2025-03-08 PROCEDURE — 80053 COMPREHEN METABOLIC PANEL: CPT | Mod: HCNC | Performed by: EMERGENCY MEDICINE

## 2025-03-08 PROCEDURE — 87086 URINE CULTURE/COLONY COUNT: CPT | Mod: HCNC

## 2025-03-08 PROCEDURE — 87040 BLOOD CULTURE FOR BACTERIA: CPT | Mod: 59,HCNC | Performed by: EMERGENCY MEDICINE

## 2025-03-08 PROCEDURE — 63600175 PHARM REV CODE 636 W HCPCS: Mod: JZ,TB,HCNC | Performed by: EMERGENCY MEDICINE

## 2025-03-08 PROCEDURE — 96374 THER/PROPH/DIAG INJ IV PUSH: CPT | Mod: HCNC

## 2025-03-08 RX ORDER — HYDROMORPHONE HYDROCHLORIDE 1 MG/ML
1 INJECTION, SOLUTION INTRAMUSCULAR; INTRAVENOUS; SUBCUTANEOUS
Refills: 0 | Status: COMPLETED | OUTPATIENT
Start: 2025-03-08 | End: 2025-03-08

## 2025-03-08 RX ORDER — ONDANSETRON HYDROCHLORIDE 2 MG/ML
4 INJECTION, SOLUTION INTRAVENOUS
Status: COMPLETED | OUTPATIENT
Start: 2025-03-08 | End: 2025-03-08

## 2025-03-08 RX ORDER — CEFTRIAXONE 2 G/1
2 INJECTION, POWDER, FOR SOLUTION INTRAMUSCULAR; INTRAVENOUS ONCE
Status: COMPLETED | OUTPATIENT
Start: 2025-03-08 | End: 2025-03-08

## 2025-03-08 RX ADMIN — SODIUM CHLORIDE, POTASSIUM CHLORIDE, SODIUM LACTATE AND CALCIUM CHLORIDE 1000 ML: 600; 310; 30; 20 INJECTION, SOLUTION INTRAVENOUS at 09:03

## 2025-03-08 RX ADMIN — HYDROMORPHONE HYDROCHLORIDE 1 MG: 1 INJECTION, SOLUTION INTRAMUSCULAR; INTRAVENOUS; SUBCUTANEOUS at 07:03

## 2025-03-08 RX ADMIN — CEFTRIAXONE SODIUM 2 G: 2 INJECTION, POWDER, FOR SOLUTION INTRAMUSCULAR; INTRAVENOUS at 09:03

## 2025-03-08 RX ADMIN — ONDANSETRON 4 MG: 2 INJECTION INTRAMUSCULAR; INTRAVENOUS at 07:03

## 2025-03-09 VITALS
BODY MASS INDEX: 34.39 KG/M2 | SYSTOLIC BLOOD PRESSURE: 107 MMHG | RESPIRATION RATE: 16 BRPM | HEIGHT: 66 IN | DIASTOLIC BLOOD PRESSURE: 72 MMHG | HEART RATE: 67 BPM | WEIGHT: 214 LBS | TEMPERATURE: 98 F | OXYGEN SATURATION: 95 %

## 2025-03-09 NOTE — ED NOTES
Pt presenting to ED from home for c/o back pain. States she was recently diagnosed with UTI last week, currently taking Cipro. 22FR 30cc suprapubic catheter patent, noted yellow urine trace hematuria in  bag. Patient c/o severe back pain 10/10 unrelieved by at home analgesics. VSS and pt in no visible distress.

## 2025-03-09 NOTE — ED PROVIDER NOTES
Encounter Date: 3/8/2025       History     Chief Complaint   Patient presents with    Back Pain     C/o lower back pain x2 days w/ N/V. Pt recent dx UTI. +blood in urine. Pt has suprapubic cath. Urine cloudy.      The patient is a 69-year-old female who came to the emergency department with flank pain.  The patient states she had a urinary tract infection earlier this week which has gotten worse.  Now she has gross hematuria and bilateral flank pain.  She denies having a fever.  She has had multiple urinary tract infections in the past.  She has chronic low back pain and has a Dilaudid pump in her back.      Review of patient's allergies indicates:   Allergen Reactions    (d)-limonene flavor      Other reaction(s): difficult intubation  Other reaction(s): Difficulty breathing    Benadryl [diphenhydramine hcl] Shortness Of Breath    Fentanyl Itching, Nausea And Vomiting and Swelling             Imitrex [sumatriptan succinate] Shortness Of Breath    Oxycodone-acetaminophen Shortness Of Breath    Sulfamethoxazole-trimethoprim Anaphylaxis    Topiramate Shortness Of Breath    Vancomycin Anaphylaxis     Rash    Butorphanol tartrate     Darvocet a500 [propoxyphene n-acetaminophen]      Other reaction(s): Difficulty breathing    Evening primrose (oenothera biennis)     Latex     Pregabalin Other (See Comments)     tremors    Sumatriptan     White petrolatum-zinc     Zinc acetate     Zinc oxide-white petrolatum      Other reaction(s): Difficulty breathing    Latex, natural rubber Itching and Rash    Phenytoin Rash and Other (See Comments)     Trouble breathing     Past Medical History:   Diagnosis Date    Anxiety     Arthritis     Bilateral lower extremity edema     severe chronic/lymphedema    Carotid artery occlusion 2012    39% stenosis to right and left    Cataract     chart states left cataract sx but pt is unsure    Cellulitis of left lower extremity 11/09/2024    pt states resolved    CHF (congestive heart failure)      Chronic pain syndrome     Constipation     Continuous leakage of urine     has suprapubic catheter    Coronary artery disease     subtotalled LAD with collateral    Depression     Detrusor overactivity     Encounter for blood transfusion     no reaction    Fever blister     GERD (gastroesophageal reflux disease)     Hard of hearing     Hyperlipidemia     Hypokalemia 01/09/2023    Hyponatremia 02/04/2022    Hypothyroid     Insomnia     Iron deficiency anemia     Lumbar radiculopathy     with chronic pain    Mixed stress and urge urinary incontinence     has suprapubic catheter    Neurogenic bladder     Ocular migraine     Other emphysema 05/22/2023    Patient has cystostomy     Renal disorder     Sleep apnea     no cpap    Yeast infection     candida in urine     Past Surgical History:   Procedure Laterality Date    ADENOIDECTOMY      BACK SURGERY      x 12    BACLOFEN PUMP IMPLANTATION      CARDIAC CATHETERIZATION  2016    subtotalled LAD with right to left collaterals    CATARACT EXTRACTION W/  INTRAOCULAR LENS IMPLANT Left     Dr Coleman     CATHETERIZATION, BLADDER N/A 12/19/2024    Procedure: CATHETERIZATION, BLADDER;  Surgeon: Florencio Armstrong MD;  Location: [facility] OR;  Service: Urology;  Laterality: N/A;  suprapubic catheter exchange    COLONOSCOPY      more than one    CYSTOSCOPIC LITHOLAPAXY N/A 06/27/2019    Procedure: CYSTOLITHOLAPAXY;  Surgeon: Shireen Mayo MD;  [facility];  Service: Urology;  Laterality: N/A;    CYSTOSCOPIC LITHOLAPAXY N/A 09/03/2019    Procedure: CYSTOLITHOLAPAXY;  Surgeon: Shireen Mayo MD;  Location: [facility] OR 2ND FLR;  Service: Urology;  Laterality: N/A;    CYSTOSCOPY N/A 07/13/2021    Procedure: CYSTOSCOPY;  Surgeon: Shireen Mayo MD;  Location: [facility] OR 1ST FLR;  Service: Urology;  Laterality: N/A;    CYSTOSCOPY  11/16/2021    Procedure: CYSTOSCOPY;  Surgeon: Shireen Mayo MD;  Location: [facility] OR 1ST FLR;  Service: Urology;;    CYSTOSCOPY  07/19/2022     Procedure: CYSTOSCOPY;  Surgeon: Shireen Mayo MD;  Location: [facility] OR [address] [place] [address];  Service: Urology;;    CYSTOSCOPY WITH INJECTION OF PERIURETHRAL BULKING AGENT  07/19/2022    Procedure: CYSTOSCOPY, WITH PERIURETHRAL BULKING AGENT INJECTION-MACROPLASTIQUE;  Surgeon: Shireen Mayo MD;  Location: [address];  Service: Urology;;    CYSTOSCOPY WITH INJECTION OF PERIURETHRAL BULKING AGENT N/A 03/28/2023    Procedure: CYSTOSCOPY, WITH PERIURETHRAL BULKING AGENT INJECTION;  Surgeon: Shireen Mayo MD;  Location: [facility] OR [address] [place] [address];  Service: Urology;  Laterality: N/A;  [place]    CYSTOSCOPY WITH INJECTION OF PERIURETHRAL BULKING AGENT N/A 11/14/2023    Procedure: CYSTOSCOPY, WITH PERIURETHRAL BULKING AGENT INJECTION;  Surgeon: Shireen Mayo MD;  Location: [address];  Service: Urology;  Laterality: N/A;    CYSTOSCOPY,WITH BOTULINUM TOXIN INJECTION N/A 12/13/2022    Procedure: CYSTOSCOPY,WITH BOTULINUM TOXIN INJECTION;  Surgeon: Shireen Mayo MD;  Location: [address];  Service: Urology;  Laterality: N/A;  300 U    CYSTOSCOPY,WITH BOTULINUM TOXIN INJECTION N/A 03/28/2023    Procedure: CYSTOSCOPY,WITH BOTULINUM TOXIN INJECTION;  Surgeon: Shireen Mayo MD;  Location: [address];  Service: Urology;  Laterality: N/A;  45 MIN.    300 UNITS    CYSTOSCOPY,WITH BOTULINUM TOXIN INJECTION N/A 12/19/2024    Procedure: CYSTOSCOPY,WITH BOTULINUM TOXIN INJECTION;  Surgeon: Florencio Armstrong MD;  Location: [facility];  Service: Urology;  Laterality: N/A;    ESOPHAGOGASTRODUODENOSCOPY N/A 05/23/2018    Procedure: ESOPHAGOGASTRODUODENOSCOPY (EGD);  Surgeon: Prince Vance MD;  Location: [facility] (4TH FLR);  Service: Endoscopy;  Laterality: N/A;  r/s 'd per Dr. Vance due to family emergency- ER    ESOPHAGOGASTRODUODENOSCOPY N/A 06/23/2023    Procedure: EGD (ESOPHAGOGASTRODUODENOSCOPY);  Surgeon: Juaquin Barry MD;  Location: [facility] ([address] [place] [address]);  Service: Endoscopy;  Laterality: N/A;   Refferal: ROSEANNE MAX  Order  tele enc 5/18/23  dx: history of a Nissen fundoplication  Additional Scheduling Information:  On home oxygen 2nd floor for airway protection also with a pain pump elevated BMI close to 40   Prep Gastroparesis   ins    ESOPHAGOGASTRODUODENOSCOPY N/A 08/12/2024    Procedure: EGD (ESOPHAGOGASTRODUODENOSCOPY);  Surgeon: Cat Cortés MD;  Location: [facility] (2ND FLR);  Service: Endoscopy;  Laterality: N/A;  referral dr max / prep inst mailed / gastropareisis/  8/5-called pt for pre call-unable to lvm-portal message sent-tb-[facility]-intrathecal pain pump  8/7 precall complete -ml    HYSTERECTOMY  1975    endometriosis    INJECTION OF BOTULINUM TOXIN TYPE A  07/13/2021    Procedure: INJECTION, BOTULINUM TOXIN, 200units;  Surgeon: Shireen Mayo MD;  [facility];  Service: Urology;;    INJECTION OF BOTULINUM TOXIN TYPE A  11/16/2021    Procedure: INJECTION, BOTULINUM TOXIN, 200units;  Surgeon: Shireen Mayo MD;  [facility];  Service: Urology;;    INJECTION OF BOTULINUM TOXIN TYPE A  07/19/2022    Procedure: INJECTION, BOTULINUM TOXIN, 300 units ;  Surgeon: Shireen Mayo MD;  [facility];  Service: Urology;;    INJECTION OF BOTULINUM TOXIN TYPE A N/A 11/14/2023    Procedure: INJECTION, BOTULINUM TOXIN, TYPE A;  Surgeon: Shireen Mayo MD;  Location: [address];  Service: Urology;  Laterality: N/A;    INSERTION OF SUPRAPUBIC CATHETER      INSERTION, PUBOVAGINAL SLING, WITH CYSTOSCOPY N/A 1/30/2025    Procedure: INSERTION, PUBOVAGINAL SLING, WITH CYSTOSCOPY;  Surgeon: Florencio Armstrong MD;  Location: [facility];  Service: Urology;  Laterality: N/A;    INSERTION, SUPRAPUBIC CATHETER N/A 12/13/2022    Procedure: INSERTION, SUPRAPUBIC CATHETER;  Surgeon: Shireen Mayo MD;  Location: [facility] OR [address] [place] [address];  Service: Urology;  Laterality: N/A;  exchange    INSERTION, SUPRAPUBIC CATHETER N/A 11/14/2023    Procedure:  INSERTION, SUPRAPUBIC CATHETER;  Surgeon: Shireen Mayo MD;  Location: [facility] OR 1ST FLR;  Service: Urology;  Laterality: N/A;  EXCHANGE of s/p tube    INSERTION, SUPRAPUBIC CATHETER N/A 1/30/2025    Procedure: INSERTION, SUPRAPUBIC CATHETER;  Surgeon: Florencio Armstrong MD;  Location: [facility] OR;  Service: Urology;  Laterality: N/A;  exchange    pain pump placement      SQ Dilaudid Pump managed by Dr. Hillman, Pain Management    REMOVAL OF BONE SPUR OF FOOT Bilateral 09/16/2022    Procedure: EXCISION ARTHRITIC BONE, BILATERAL FOOT;  Surgeon: Adam Mcguire DPM;  Location: [facility] OR;  Service: Podiatry;  Laterality: Bilateral;    REPLACEMENT OF BACLOFEN PUMP N/A 08/02/2023    Procedure: REPLACEMENT, BACLOFEN PUMP;  Surgeon: Smitha Condon MD;  Location: [facility] OR 2ND FLR;  Service: Neurosurgery;  Laterality: N/A;  Anes: Gen  Blood: Type & Screen  Pos: Left Lat  Intrathecal Pump  Bed: Reg Reversed  Rad: C-arm  Pump: 40 cc, [employer]tronics    REPLACEMENT OF CATHETER N/A 10/31/2019    Procedure: REPLACEMENT, CATHETER-SUPRAPUBIC;  Surgeon: Shireen Mayo MD;  Location: [facility] OR 1ST FLR;  Service: Urology;  Laterality: N/A;    SPINAL CORD STIMULATOR IMPLANT      SPINAL CORD STIMULATOR REMOVAL      before Larissa    SPINE SURGERY  05/13/2013    CERVICAL FUSION    TONSILLECTOMY       Family History   Problem Relation Name Age of Onset    Cancer Mother  55        breast    Cancer Father          esophagus,had laryngectomy    Esophageal cancer Father      Parkinsonism Maternal Grandmother      Tremor Maternal Grandmother      No Known Problems Brother      No Known Problems Brother      Heart disease Maternal Uncle klarissa     Colon cancer Maternal Uncle klarissa         Less than 60    No Known Problems Sister      No Known Problems Maternal Aunt      Cirrhosis Paternal Aunt          ETOH    Liver disease Paternal Aunt          ETOH    Liver disease Paternal Uncle          ETOH    Cirrhosis Paternal Uncle          ETOH    No  Known Problems Maternal Grandfather      No Known Problems Paternal Grandmother      No Known Problems Paternal Grandfather      Melanoma Neg Hx      Amblyopia Neg Hx      Blindness Neg Hx      Cataracts Neg Hx      Diabetes Neg Hx      Glaucoma Neg Hx      Hypertension Neg Hx      Macular degeneration Neg Hx      Retinal detachment Neg Hx      Strabismus Neg Hx      Stroke Neg Hx      Thyroid disease Neg Hx      Celiac disease Neg Hx      Colon polyps Neg Hx      Cystic fibrosis Neg Hx      Crohn's disease Neg Hx      Inflammatory bowel disease Neg Hx      Liver cancer Neg Hx      Rectal cancer Neg Hx      Stomach cancer Neg Hx      Ulcerative colitis Neg Hx      Lymphoma Neg Hx       Social History[1]  Review of Systems   All other systems reviewed and are negative.      Physical Exam     Initial Vitals [03/08/25 1711]   BP Pulse Resp Temp SpO2   134/62 89 20 97.8 °F (36.6 °C) 95 %      MAP       --         Physical Exam    Nursing note and vitals reviewed.  Constitutional: She appears well-developed and well-nourished.   HENT:   Head: Normocephalic and atraumatic.   Neck: Neck supple.   Normal range of motion.  Pulmonary/Chest: Breath sounds normal.   Abdominal: Abdomen is soft.   Musculoskeletal:      Cervical back: Normal range of motion and neck supple.     Neurological: She is alert and oriented to person, place, and time.   Skin: Skin is warm and dry.   Psychiatric: She has a normal mood and affect. Her behavior is normal. Judgment and thought content normal.         ED Course   Procedures  Labs Reviewed   URINALYSIS, REFLEX TO URINE CULTURE - Abnormal       Result Value    Specimen UA Urine, Supra Pubic      Color, UA Yellow      Appearance, UA Hazy (*)     pH, UA 5.0      Specific Gravity, UA 1.010      Protein, UA Trace (*)     Glucose, UA Negative      Ketones, UA Negative      Bilirubin (UA) Negative      Occult Blood UA 3+ (*)     Nitrite, UA Negative      Urobilinogen, UA Negative      Leukocytes, UA  3+ (*)     Narrative:     Specimen Source->Urine   URINALYSIS MICROSCOPIC - Abnormal    RBC, UA >100 (*)     WBC, UA 88 (*)     WBC Clumps, UA Occasional (*)     Bacteria Occasional      Yeast, UA Many (*)     Squam Epithel, UA 1      Microscopic Comment SEE COMMENT      Narrative:     Specimen Source->Urine   COMPREHENSIVE METABOLIC PANEL - Abnormal    Sodium 132 (*)     Potassium 4.5      Chloride 100      CO2 24      Glucose 103      BUN 22      Creatinine 1.3      Calcium 9.8      Total Protein 7.5      Albumin 4.0      Total Bilirubin 0.5      Alkaline Phosphatase 119      AST 13      ALT 6 (*)     eGFR 45 (*)     Anion Gap 8     CULTURE, URINE   CULTURE, BLOOD   CULTURE, BLOOD   CBC W/ AUTO DIFFERENTIAL    WBC 8.95      RBC 4.45      Hemoglobin 12.9      Hematocrit 39.1      MCV 88      MCH 29.0      MCHC 33.0      RDW 14.4      Platelets 185      MPV 10.3      Immature Granulocytes 0.3      Gran # (ANC) 6.1      Immature Grans (Abs) 0.03      Lymph # 2.0      Mono # 0.6      Eos # 0.3      Baso # 0.04      nRBC 0      Gran % 68.0      Lymph % 21.8      Mono % 6.6      Eosinophil % 2.9      Basophil % 0.4      Differential Method Automated     LACTIC ACID, PLASMA    Lactate (Lactic Acid) 1.9            Imaging Results              CT Renal Stone Study ABD Pelvis WO (Final result)  Result time 03/08/25 23:17:09      Final result by Antonio Anthony, DO (03/08/25 23:17:09)                   Impression:      1. Suprapubic catheter in place.  Wall thickening the urinary bladder.  Recommend correlation with urinalysis to exclude cystitis.  2. Mild partially imaged patchy ground-glass opacities in the lung bases, suspicious for mild edema versus pneumonia.      Electronically signed by: Antonio Anthony  Date:    03/08/2025  Time:    23:17               Narrative:    EXAMINATION:  CT RENAL STONE STUDY ABD PELVIS WO    CLINICAL HISTORY:  Flank pain, kidney stone suspected;    TECHNIQUE:  Multiplanar images were  obtained of the abdomen and pelvis from the hemidiaphragms through the symphysis pubis without intravenous contrast.    COMPARISON:  CT abdomen and pelvis from 03/10/2024.    FINDINGS:  Lung Bases: Mild patchy ground-glass opacities in the bilateral lower lobes, lingula and the right middle lobe, suspicious for mild edema or pneumonia.    Heart: There is cardiomegaly.  No pericardial effusion.    Liver: Normal in size and attenuation without focal hepatic lesion.    Biliary tract: No intrahepatic or extrahepatic biliary ductal dilatation.    Gallbladder: No radiodense gallstone. No wall thickening or pericholecystic fluid.    Pancreas: Normal. No pancreatic ductal dilatation.    Spleen: Normal size without focal lesion.    Adrenals: Normal.    Kidneys and urinary collecting systems: Mild renal atrophy bilaterally.  No hydronephrosis or urolithiasis.    Lymph nodes: None enlarged.    Stomach and bowel: There is a moderate hiatal hernia.  Loops of small and large bowel are normal in caliber without evidence for inflammation or obstruction.  There is a moderate volume of stool, correlate for constipation. The appendix is normal.    Peritoneum and mesentery: No ascites or free intraperitoneal air. No abdominal fluid collection.    Vasculature: Normal.    Urinary bladder: There is a suprapubic catheter in place.  There is wall thickening of the urinary bladder.    Reproductive organs: The uterus is absent.    Body wall: No abnormality.    Musculoskeletal: No aggressive osseous lesion.  There is diastasis of the pubic symphysis, stable.  There are multilevel degenerative changes of the spine.  There is dextroconvex scoliosis of the lumbar spine.  There are thoracic spinal stimulator leads.  There are postop changes of the lumbar spine, with resultant streak artifact.                                       Medications   HYDROmorphone injection 1 mg (1 mg Intravenous Given 3/8/25 1946)   ondansetron injection 4 mg (4 mg  Intravenous Given 3/8/25 1946)   cefTRIAXone injection 2 g (2 g Intravenous Given 3/8/25 2128)   lactated ringers bolus 1,000 mL (1,000 mLs Intravenous New Bag 3/8/25 2129)     Medical Decision Making  Differential Diagnosis includes, but is not limited to:  AAA, aortic dissection, SBO/volvulus, intussusception, ileus, appendicitis, cholecystitis, hepatitis, nephrolithiasis, pancreatitis, IBD/IBS, biliary colic, GERD, PUD, constipation, UTI/pyelonephritis, musculoskeletal pain.      MDM: The patient is a 69-year-old female with a urinary tract infection.  She has had multiple urinary tract infections in the past.  She will be given Rocephin.  She is having bilateral flank pain at this time so will check a CT scan to rule out stone versus pyelonephritis.    1208:  CT scan shows no evidence of pyelonephritis or kidney stones or obstruction.  Her labs are at baseline and the patient will be discharged home at this time.  She has a known urinary infection, culture grew yeast and she was already started on Diflucan.  She has a suprapubic catheter in place.  I recommend that she continues her Cipro until gone and her Diflucan and follow up with her urologist on Monday.  Her back pain is resolved after a dose of Dilaudid.    Amount and/or Complexity of Data Reviewed  Labs: ordered. Decision-making details documented in ED Course.  Radiology: ordered. Decision-making details documented in ED Course.    Risk  Prescription drug management.               ED Course as of 03/09/25 0011   Sat Mar 08, 2025   2100 WBC: 8.95 [ST]   2100 CBC auto differential [ST]   2100 Urinalysis, Reflex to Urine Culture Urine, Supra Pubic(!) [ST]   2100 Urinalysis Microscopic(!) [ST]   2100 RBC, UA(!): >100 [ST]   2101 WBC, UA(!): 88 [ST]   2101 WBC Clumps, UA(!): Occasional [ST]   2101 Bacteria, UA: Occasional [ST]   2101 Yeast, UA(!): Many [ST]   2101 Comprehensive metabolic panel(!) [ST]   2101 Creatinine: 1.3 [ST]   2101 eGFR(!): 45 [ST]  "  2101 The patient has a urinary tract infection.  She does not meet sirs/sepsis criteria but I will order blood cultures and lactic acid.  Urine culture was sent. [ST]   2347 CT Renal Stone Study ABD Pelvis WO  Impression:     1. Suprapubic catheter in place.  Wall thickening the urinary bladder.  Recommend correlation with urinalysis to exclude cystitis.  2. Mild partially imaged patchy ground-glass opacities in the lung bases, suspicious for mild edema versus pneumonia.   [ST]      ED Course User Index  [ST] Tasha Anthony MD                           Clinical Impression:  Final diagnoses:  [M54.50] Acute bilateral low back pain without sciatica (Primary)          ED Disposition Condition    Discharge Stable          ED Prescriptions    None       Follow-up Information       Follow up With Specialties Details Why Contact Info    Florencio Armstrong MD Urology Call in 2 days  [address] [place] [address]  [address] [place] [address]  [phone]                 [1]   Social History  Tobacco Use    Smoking status: Never    Smokeless tobacco: Never   Substance Use Topics    Alcohol use: Never    Drug use: Yes     Types: Marijuana     Comment: "medical marijuana gummies"        Tasha Anthony MD  03/09/25 0011    "

## 2025-03-09 NOTE — DISCHARGE INSTRUCTIONS
CONTINUE YOUR CIPRO AND DIFLUCAN AS DIRECTED  RETURN TO THE EMERGENCY DEPARTMENT IF YOU START RUNNING A FEVER OR YOUR BACK PAIN BECOMES SEVERE.  FOLLOW-UP WITH YOUR UROLOGIST ON MONDAY REGARDING ADDITIONAL ANTIFUNGALS OR ANTIBIOTICS.

## 2025-03-10 LAB
BACTERIA UR CULT: NORMAL
BACTERIA UR CULT: NORMAL

## 2025-03-12 ENCOUNTER — NURSE TRIAGE (OUTPATIENT)
Dept: ADMINISTRATIVE | Facility: CLINIC | Age: 70
End: 2025-03-12
Payer: MEDICARE

## 2025-03-12 NOTE — TELEPHONE ENCOUNTER
Call disconnects during transfer from /. No contact made with caller x 3 attempts to mobile & home phone numbers listed on chart. No option to leave vm on mobile or home number.  Reason for Disposition   Third attempt to contact caller AND no contact made. Phone number verified.    Protocols used: No Contact or Duplicate Contact Call-A-OH

## 2025-03-13 ENCOUNTER — TELEPHONE (OUTPATIENT)
Dept: UROLOGY | Facility: CLINIC | Age: 70
End: 2025-03-13
Payer: MEDICARE

## 2025-03-13 NOTE — TELEPHONE ENCOUNTER
I returned pt's call, got her VM. I left a message indicating that her urine culture that resulted on 3/10/25 did not show bladder infection. I left my name and callback number on .

## 2025-03-13 NOTE — TELEPHONE ENCOUNTER
----- Message from Gracie sent at 3/13/2025 12:13 PM CDT -----  Regarding: Call back  Contact: [phone]  Type:  Patient Returning CallWho Called: PT Who Left Message for Patient: Nurse Does the patient know what this is regarding?: Yes Would the patient rather a call back or a response via [facility]? Call back Best Call Back Number: [phone] Additional Information: Having a UTI for over 3 weeks and needs medication called in.

## 2025-03-14 ENCOUNTER — TELEPHONE (OUTPATIENT)
Dept: INTERNAL MEDICINE | Facility: CLINIC | Age: 70
End: 2025-03-14

## 2025-03-14 ENCOUNTER — OFFICE VISIT (OUTPATIENT)
Dept: INTERNAL MEDICINE | Facility: CLINIC | Age: 70
End: 2025-03-14
Payer: MEDICARE

## 2025-03-14 ENCOUNTER — TELEPHONE (OUTPATIENT)
Dept: UROLOGY | Facility: CLINIC | Age: 70
End: 2025-03-14
Payer: MEDICARE

## 2025-03-14 VITALS
SYSTOLIC BLOOD PRESSURE: 112 MMHG | BODY MASS INDEX: 36.28 KG/M2 | WEIGHT: 225.75 LBS | HEART RATE: 60 BPM | DIASTOLIC BLOOD PRESSURE: 60 MMHG | HEIGHT: 66 IN

## 2025-03-14 DIAGNOSIS — N31.9 NEUROGENIC BLADDER: Chronic | ICD-10-CM

## 2025-03-14 DIAGNOSIS — T83.510D URINARY TRACT INFECTION ASSOCIATED WITH CYSTOSTOMY CATHETER, SUBSEQUENT ENCOUNTER: ICD-10-CM

## 2025-03-14 DIAGNOSIS — I89.0 LYMPHEDEMA OF BOTH LOWER EXTREMITIES: Primary | ICD-10-CM

## 2025-03-14 DIAGNOSIS — G47.01 INSOMNIA DUE TO MEDICAL CONDITION: Chronic | ICD-10-CM

## 2025-03-14 DIAGNOSIS — R52 INTRACTABLE PAIN: ICD-10-CM

## 2025-03-14 DIAGNOSIS — F11.20 NARCOTIC DEPENDENCY, CONTINUOUS: ICD-10-CM

## 2025-03-14 DIAGNOSIS — Z12.11 SCREENING FOR MALIGNANT NEOPLASM OF COLON: ICD-10-CM

## 2025-03-14 DIAGNOSIS — H60.321: ICD-10-CM

## 2025-03-14 DIAGNOSIS — I50.32 CHRONIC DIASTOLIC HEART FAILURE: ICD-10-CM

## 2025-03-14 DIAGNOSIS — N39.0 URINARY TRACT INFECTION ASSOCIATED WITH CYSTOSTOMY CATHETER, SUBSEQUENT ENCOUNTER: ICD-10-CM

## 2025-03-14 DIAGNOSIS — E66.01 SEVERE OBESITY (BMI 35.0-39.9) WITH COMORBIDITY: ICD-10-CM

## 2025-03-14 PROBLEM — G47.00 INSOMNIA: Status: RESOLVED | Noted: 2024-05-22 | Resolved: 2025-03-14

## 2025-03-14 PROBLEM — L97.522 CHRONIC ULCER OF LEFT FOOT WITH FAT LAYER EXPOSED: Status: RESOLVED | Noted: 2024-08-26 | Resolved: 2025-03-14

## 2025-03-14 PROBLEM — I87.333 CHRONIC VENOUS HYPERTENSION (IDIOPATHIC) WITH ULCER AND INFLAMMATION OF BILATERAL LOWER EXTREMITY: Status: RESOLVED | Noted: 2023-09-22 | Resolved: 2025-03-14

## 2025-03-14 LAB
BACTERIA BLD CULT: NORMAL
BACTERIA BLD CULT: NORMAL

## 2025-03-14 PROCEDURE — 99999 PR PBB SHADOW E&M-EST. PATIENT-LVL V: CPT | Mod: PBBFAC,HCNC,, | Performed by: INTERNAL MEDICINE

## 2025-03-14 RX ORDER — QUETIAPINE FUMARATE 100 MG/1
100 TABLET, FILM COATED ORAL NIGHTLY
Qty: 90 TABLET | Refills: 3 | Status: SHIPPED | OUTPATIENT
Start: 2025-03-14 | End: 2026-03-09

## 2025-03-14 RX ORDER — NEOMYCIN SULFATE, POLYMYXIN B SULFATE AND HYDROCORTISONE 10; 3.5; 1 MG/ML; MG/ML; [USP'U]/ML
3 SUSPENSION/ DROPS AURICULAR (OTIC) 4 TIMES DAILY
Qty: 10 ML | Refills: 0 | Status: SHIPPED | OUTPATIENT
Start: 2025-03-14

## 2025-03-14 RX ORDER — TRAZODONE HYDROCHLORIDE 300 MG/1
TABLET ORAL
Qty: 45 TABLET | Refills: 2 | Status: SHIPPED | OUTPATIENT
Start: 2025-03-14

## 2025-03-14 NOTE — TELEPHONE ENCOUNTER
----- Message from Brianna sent at 3/14/2025  3:31 PM CDT -----  Contact: [phone]  Patient is returning a phone call.Who left a message for the patient: Celeste Ellis MADoes patient know what this is regarding:  yes Would you like a call back, or a response through your [facility] portal?:   call back Comments:  ----- Message -----  From: Brianna Garcia  Sent: 3/14/2025   3:32 PM CDT  To: Carroll Weber    Patient is returning a phone call.Who left a message for the patient: Celeste Ellis MADoes patient know what this is regarding:  yes Would you like a call back, or a response through your [facility] portal?:   call back Comments:

## 2025-03-14 NOTE — TELEPHONE ENCOUNTER
----- Message from Audrey sent at 3/14/2025 10:40 AM CDT -----  .Type:  Needs Medical AdviceWho Called: ptSymptoms (please be specific): UTI and leaking  Would the patient rather a call back or a response via [facility]? Call [facility] Call Back Number: [phone] Information:  Pt stated to please call back as soon as possible

## 2025-03-14 NOTE — TELEPHONE ENCOUNTER
I returned pt's call, advised that her most recent urine culture (3/10/25) was negative for UTI. I also told her that Dr. Armstrong is aware of the leakage, I notified him of that on 2/28/25 and he said no immediate action was required. Patient still had multiple concerns and questions that I was not able to address so we moved up her appt in August to June and I added her to the waiting list for cancellation.

## 2025-03-14 NOTE — TELEPHONE ENCOUNTER
----- Message from Luz Elena sent at 3/14/2025  2:54 PM CDT -----  Contact: [phone]  .[email or web address] Patient is calling for Medical Advice regarding:pt is requesting a call back in regards to a call and needs someone to call as soon as possible Patient wants a call back or thru [facility]:call back Comments:Please advise patient replies from provider may take up to 48 hours.

## 2025-03-14 NOTE — PROGRESS NOTES
Subjective:       Patient ID: Tasha Hawley is a 69 y.o. female.    Chief Complaint:   Follow-up    HPI - She has missed a few appointments; came today with a friend who has been helping with her medications.  She feels better than usual.  Concerned about her spouse who has worsening dementia.  LE swelling is less.  She has a UTI; hasn't started antibiotics yet.  Needs some refills.  Requested tramadol; I declined b/c she's on percocet and has an indwelling pain pump.  Due for colon cancer screening.  She has some ear pain that she wants me to evaluated.  She's not a smoker.    PMH:  Multiple SDOH interfering with care  Neurogenic bladder, with recurrent UTI's. Suprapubic catheter  CAD, with ACS in Jan 2016  Ischemic cardiomyopathy  Chronic insomnia  Gross Lymphedema bilateral LE's with intermittent weeping and skin breakdown - greatly improved today  Chronic low back pain with indwelling narcotic pump  History CVA with dysarthria and swallowing difficulty  Hypothyroidism  CECI, on CPAP  Anxiety and Depression  Chronic constipation, d/t ongoing narcotic use.  Dependent on home oxygen     Meds: Reviewed and reconciled in EPIC with patient during visit today.     Review of Systems   Constitutional:  Positive for fatigue. Negative for fever.   HENT:  Negative for congestion.    Respiratory:  Negative for shortness of breath.    Cardiovascular:  Negative for chest pain.   Gastrointestinal:  Positive for constipation. Negative for abdominal pain.   Genitourinary:  Positive for dysuria.   Musculoskeletal:  Positive for arthralgias and back pain.   Skin:  Negative for rash.   Neurological:  Negative for headaches.   Psychiatric/Behavioral:  Positive for sleep disturbance.        Objective:      Physical Exam  Vitals reviewed.   Constitutional:       Appearance: She is well-developed.   HENT:      Head: Normocephalic and atraumatic.      Left Ear: There is impacted cerumen.      Ears:      Comments: Right sided  hemorrhagic otitis externa  Cardiovascular:      Rate and Rhythm: Normal rate and regular rhythm.      Heart sounds: Normal heart sounds. No murmur heard.     No friction rub. No gallop.   Pulmonary:      Effort: Pulmonary effort is normal. No respiratory distress.      Breath sounds: Normal breath sounds. No wheezing or rales.   Chest:      Chest wall: No tenderness.   Skin:     General: Skin is warm and dry.      Findings: No erythema.   Neurological:      General: No focal deficit present.      Mental Status: She is alert.   Psychiatric:         Mood and Affect: Mood normal.         Assessment:       1. Lymphedema of both lower extremities    2. Neurogenic bladder    3. Narcotic dependency, continuous    4. Insomnia due to medical condition    5. Intractable pain    6. Chronic diastolic heart failure    7. Severe obesity (BMI 35.0-39.9) with comorbidity    8. Urinary tract infection associated with cystostomy catheter, subsequent encounter    9. Screening for malignant neoplasm of colon    10. Chronic hemorrhagic otitis externa of right ear        Plan:       Tahsa was seen today for follow-up.    Diagnoses and all orders for this visit:    Lymphedema of both lower extremities - improving on treatment.  Continue present care    Neurogenic bladder - suprapubic catheter; followed by urology    Narcotic dependency, continuous - indwelling pump plus orals.  Stable.  I declined to fill a third narcotic    Insomnia due to medical condition - longstanding; on multiple meds.  refilling  -     traZODone (DESYREL) 300 MG tablet; TAKE ONE-HALF TABLET BY MOUTH EVERY EVENING  -     QUEtiapine (SEROQUEL) 100 MG Tab; Take 1 tablet (100 mg total) by mouth nightly.    Intractable pain    Chronic diastolic heart failure - stable.  Seems clear today    Severe obesity (BMI 35.0-39.9) with comorbidity    Urinary tract infection associated with cystostomy catheter, subsequent encounter - she has macrobid at home; please take this  medication    Screening for malignant neoplasm of colon  -     Fecal Immunochemical Test (iFOBT); Future    Chronic hemorrhagic otitis externa of right ear - new problem.  Trial of ear drops.  -     neomycin-polymyxin-hydrocortisone (CORTISPORIN) 3.5-10,000-1 mg/mL-unit/mL-% otic suspension; Place 3 drops into both ears 4 (four) times daily.    Rtc prn, or in 3 months    PAPO Hodges MD MPH  Staff Internist

## 2025-03-18 ENCOUNTER — TELEPHONE (OUTPATIENT)
Dept: UROLOGY | Facility: CLINIC | Age: 70
End: 2025-03-18
Payer: MEDICARE

## 2025-03-18 ENCOUNTER — NURSE TRIAGE (OUTPATIENT)
Dept: ADMINISTRATIVE | Facility: CLINIC | Age: 70
End: 2025-03-18
Payer: MEDICARE

## 2025-03-18 DIAGNOSIS — N31.9 NEUROGENIC BLADDER: Primary | ICD-10-CM

## 2025-03-18 DIAGNOSIS — R30.0 DYSURIA: ICD-10-CM

## 2025-03-18 DIAGNOSIS — N39.0 COMPLICATED UTI (URINARY TRACT INFECTION): ICD-10-CM

## 2025-03-18 RX ORDER — NITROFURANTOIN 25; 75 MG/1; MG/1
CAPSULE ORAL
Qty: 28 CAPSULE | Refills: 0 | OUTPATIENT
Start: 2025-03-18

## 2025-03-18 NOTE — TELEPHONE ENCOUNTER
----- Message from Mitzi sent at 3/18/2025  2:24 PM CDT -----  Contact: [phone]/Lisa/[facility]  .1MEDICALADVICE Patient is calling for Medical Advice regarding: Lisa with [facility] is calling to say that patient was admitted to home health today and patient stated she has not had a bowel movement for 2 weeks , however  said 3 days .. Nurse suggested Miralax OTC and is that ok

## 2025-03-18 NOTE — TELEPHONE ENCOUNTER
----- Message from Nya sent at 3/18/2025 10:23 AM CDT -----  Contact: [phone]  .1MEDICALADVICE Patient is calling for Medical Advice regarding: Patient is requesting a call from the staff regarding antibiotic [facility] and phone[facility] [address] [facility] [place] [address]: [phone] Fax: [phone] wants a call back or thru [facility]: call back Comments: Thank you Please advise patient replies from provider may take up to 48 hours.

## 2025-03-18 NOTE — TELEPHONE ENCOUNTER
Spoke to patient she states she is having burning and pressure. Started Macrobid 2 days ago but doesn't have enough for a 5 or 7 day course. Will you refill prescription for patient.

## 2025-03-18 NOTE — TELEPHONE ENCOUNTER
Pt stated she is calling to get antibiotics refill for UTI. Informed pt that Dr Denton called in refill this am. Pt stated no one called her. Informed pt that the clinic staff may have been busy seeing pts and hadn't had time to call her. Pt stated thank you and verbalized understanding.   Reason for Disposition   [1] Follow-up call to recent contact AND [2] information only call, no triage required    Protocols used: Information Only Call-A-

## 2025-03-18 NOTE — TELEPHONE ENCOUNTER
----- Message from Hugo sent at 3/18/2025 12:56 PM CDT -----  Contact: pt @  [phone]  Name of Who is Calling: pt  What is the request in detail: calling to discuss refill request  Can the clinic reply by [facility]: no  What [facility]:  [phone]

## 2025-03-18 NOTE — TELEPHONE ENCOUNTER
----- Message from Jojo sent at 3/18/2025  3:14 PM CDT -----  Type: RX Refill Request Who Called:Self  Have you contacted your pharmacy:NO Refill or New RX: Refill  RX Name and Strength:Nitrofurantoin 50mg Preferred Pharmacy with phone number:.[facility] [place] [address]Zojxj: [phone] Fax: [phone] or Mail Order: Would the patient rather a call back or a response via [facility]? Best Call Back Number:.[phone]  Additional Information: Thank you.  
Spoke to patient two days ago, I reassured her then and again today that she had a urine culture resulted 10 days ago that showed no infection. She feels she has another bladder infection starting, she reports pressure and blood in urine. I entered orders for urine testing, instructed her to go to the lab to submit sample.  
No

## 2025-03-24 DIAGNOSIS — Z00.00 ENCOUNTER FOR MEDICARE ANNUAL WELLNESS EXAM: ICD-10-CM

## 2025-03-25 ENCOUNTER — TELEPHONE (OUTPATIENT)
Dept: UROLOGY | Facility: CLINIC | Age: 70
End: 2025-03-25
Payer: MEDICARE

## 2025-03-25 ENCOUNTER — HOSPITAL ENCOUNTER (EMERGENCY)
Facility: HOSPITAL | Age: 70
Discharge: HOME | End: 2025-03-25
Attending: EMERGENCY MEDICINE
Payer: MEDICARE

## 2025-03-25 VITALS
BODY MASS INDEX: 36.16 KG/M2 | OXYGEN SATURATION: 95 % | SYSTOLIC BLOOD PRESSURE: 123 MMHG | DIASTOLIC BLOOD PRESSURE: 63 MMHG | HEART RATE: 78 BPM | TEMPERATURE: 98 F | WEIGHT: 225 LBS | HEIGHT: 66 IN | RESPIRATION RATE: 18 BRPM

## 2025-03-25 DIAGNOSIS — B37.41 CANDIDA CYSTITIS: ICD-10-CM

## 2025-03-25 DIAGNOSIS — T83.010A SUPRAPUBIC CATHETER DYSFUNCTION, INITIAL ENCOUNTER: Primary | ICD-10-CM

## 2025-03-25 LAB
ABSOLUTE EOSINOPHIL (OHS): 0.29 K/UL
ABSOLUTE MONOCYTE (OHS): 0.57 K/UL (ref 0.3–1)
ABSOLUTE NEUTROPHIL COUNT (OHS): 4.38 K/UL (ref 1.8–7.7)
ANION GAP (OHS): 12 MMOL/L (ref 8–16)
BASOPHILS # BLD AUTO: 0.03 K/UL
BASOPHILS NFR BLD AUTO: 0.4 %
BUN SERPL-MCNC: 7 MG/DL (ref 8–23)
CALCIUM SERPL-MCNC: 9.5 MG/DL (ref 8.7–10.5)
CHLORIDE SERPL-SCNC: 107 MMOL/L (ref 95–110)
CO2 SERPL-SCNC: 24 MMOL/L (ref 23–29)
CREAT SERPL-MCNC: 1 MG/DL (ref 0.5–1.4)
ERYTHROCYTE [DISTWIDTH] IN BLOOD BY AUTOMATED COUNT: 14.1 % (ref 11.5–14.5)
GFR SERPLBLD CREATININE-BSD FMLA CKD-EPI: >60 ML/MIN/1.73/M2
GLUCOSE SERPL-MCNC: 87 MG/DL (ref 70–110)
HCT VFR BLD AUTO: 38.1 % (ref 37–48.5)
HGB BLD-MCNC: 12.4 GM/DL (ref 12–16)
IMM GRANULOCYTES # BLD AUTO: 0.02 K/UL (ref 0–0.04)
IMM GRANULOCYTES NFR BLD AUTO: 0.3 % (ref 0–0.5)
LYMPHOCYTES # BLD AUTO: 2.16 K/UL (ref 1–4.8)
MCH RBC QN AUTO: 28.8 PG (ref 27–50)
MCHC RBC AUTO-ENTMCNC: 32.5 G/DL (ref 32–36)
MCV RBC AUTO: 88 FL (ref 82–98)
NUCLEATED RBC (/100WBC) (OHS): 0 /100 WBC
PLATELET # BLD AUTO: 248 K/UL (ref 150–450)
PMV BLD AUTO: 9.3 FL (ref 9.2–12.9)
POTASSIUM SERPL-SCNC: 3.8 MMOL/L (ref 3.5–5.1)
RBC # BLD AUTO: 4.31 M/UL (ref 4–5.4)
RELATIVE EOSINOPHIL (OHS): 3.9 %
RELATIVE LYMPHOCYTE (OHS): 29 % (ref 18–48)
RELATIVE MONOCYTE (OHS): 7.7 % (ref 4–15)
RELATIVE NEUTROPHIL (OHS): 58.7 % (ref 38–73)
SODIUM SERPL-SCNC: 143 MMOL/L (ref 136–145)
WBC # BLD AUTO: 7.45 K/UL (ref 3.9–12.7)

## 2025-03-25 PROCEDURE — 85025 COMPLETE CBC W/AUTO DIFF WBC: CPT | Mod: HCNC | Performed by: EMERGENCY MEDICINE

## 2025-03-25 PROCEDURE — 99284 EMERGENCY DEPT VISIT MOD MDM: CPT | Mod: HCNC

## 2025-03-25 PROCEDURE — 63700000 PHARM REV CODE 250 ALT 637 W/O HCPCS: Mod: HCNC | Performed by: EMERGENCY MEDICINE

## 2025-03-25 PROCEDURE — 80048 BASIC METABOLIC PNL TOTAL CA: CPT | Mod: HCNC | Performed by: EMERGENCY MEDICINE

## 2025-03-25 RX ORDER — FLUCONAZOLE 200 MG/1
200 TABLET ORAL DAILY
Qty: 14 TABLET | Refills: 0 | Status: SHIPPED | OUTPATIENT
Start: 2025-03-25 | End: 2025-04-10

## 2025-03-25 RX ORDER — MICONAZOLE NITRATE 2 G/100G
POWDER TOPICAL 2 TIMES DAILY
Qty: 85 G | Refills: 1 | Status: SHIPPED | OUTPATIENT
Start: 2025-03-25

## 2025-03-25 RX ORDER — FLUCONAZOLE 100 MG/1
200 TABLET ORAL
Status: COMPLETED | OUTPATIENT
Start: 2025-03-25 | End: 2025-03-25

## 2025-03-25 RX ADMIN — FLUCONAZOLE 200 MG: 100 TABLET ORAL at 03:03

## 2025-03-25 NOTE — DISCHARGE INSTRUCTIONS
You should not take trazodone while you are taking fluconazole for your yeast infection in your bladder.  Make sure to keep applying of the miconazole cream to the areas with your yeast infection

## 2025-03-25 NOTE — TELEPHONE ENCOUNTER
----- Message from Audrey sent at 3/25/2025  8:59 AM CDT -----  .Type:  Needs Medical AdviceWho Called: ptWould the patient rather a call back or a response via [facility]? Call [facility] Call Back Number: [phone] Information:  Pt stated she would like a call back regarding her yeast infection

## 2025-03-25 NOTE — TELEPHONE ENCOUNTER
Patient was seen in ER for yeast in urine. She has asked that you review the ER record to determine f/u. She has an appt 5/16/25 with Dr. Armstrong.

## 2025-03-25 NOTE — ED PROVIDER NOTES
Encounter Date: 3/25/2025       History     Chief Complaint   Patient presents with    Hematuria     Patient reports blood in her suprapubic catheter all week which is worsening over the past two days with acute pelvic pain. She also reports chills.      Patient is a 69-year-old female with multiple medical problems (see below) who presents the emergency room for evaluation of pinkish hematuria in her suprapubic catheter.  She had a suprapubic catheter placed 4 weeks ago.  She had a recent urinalysis revealing Candida.  She is supposed to have her suprapubic catheter changed 3 days ago for the 1st time.  Denies any significant nausea vomiting diarrhea fevers.  She states she had mild chills.  She is having some mild pelvic discomfort.      Review of patient's allergies indicates:   Allergen Reactions    (d)-limonene flavor      Other reaction(s): difficult intubation  Other reaction(s): Difficulty breathing    Benadryl [diphenhydramine hcl] Shortness Of Breath    Fentanyl Itching, Nausea And Vomiting and Swelling             Imitrex [sumatriptan succinate] Shortness Of Breath    Oxycodone-acetaminophen Shortness Of Breath    Sulfamethoxazole-trimethoprim Anaphylaxis    Topiramate Shortness Of Breath    Vancomycin Anaphylaxis     Rash    Butorphanol tartrate     Darvocet a500 [propoxyphene n-acetaminophen]      Other reaction(s): Difficulty breathing    Evening primrose (oenothera biennis)     Latex     Pregabalin Other (See Comments)     tremors    Sumatriptan     White petrolatum-zinc     Zinc acetate     Zinc oxide-white petrolatum      Other reaction(s): Difficulty breathing    Latex, natural rubber Itching and Rash    Phenytoin Rash and Other (See Comments)     Trouble breathing     Past Medical History:   Diagnosis Date    Anxiety     Arthritis     Bilateral lower extremity edema     severe chronic/lymphedema    Carotid artery occlusion 2012    39% stenosis to right and left    Cataract     chart states left  cataract sx but pt is unsure    Cellulitis of left lower extremity 11/09/2024    pt states resolved    CHF (congestive heart failure)     Chronic pain syndrome     Constipation     Continuous leakage of urine     has suprapubic catheter    Coronary artery disease     subtotalled LAD with collateral    Depression     Detrusor overactivity     Encounter for blood transfusion     no reaction    Fever blister     GERD (gastroesophageal reflux disease)     Hard of hearing     Hyperlipidemia     Hypokalemia 01/09/2023    Hyponatremia 02/04/2022    Hypothyroid     Insomnia     Iron deficiency anemia     Lumbar radiculopathy     with chronic pain    Mixed stress and urge urinary incontinence     has suprapubic catheter    Neurogenic bladder     Ocular migraine     Other emphysema 05/22/2023    Patient has cystostomy     Renal disorder     Sleep apnea     no cpap    Yeast infection     candida in urine     Past Surgical History:   Procedure Laterality Date    ADENOIDECTOMY      BACK SURGERY      x 12    BACLOFEN PUMP IMPLANTATION      CARDIAC CATHETERIZATION  2016    subtotalled LAD with right to left collaterals    CATARACT EXTRACTION W/  INTRAOCULAR LENS IMPLANT Left     Dr Coleman     CATHETERIZATION, BLADDER N/A 12/19/2024    Procedure: CATHETERIZATION, BLADDER;  Surgeon: Florencio Armstrong MD;  Location: [facility];  Service: Urology;  Laterality: N/A;  suprapubic catheter exchange    COLONOSCOPY      more than one    CYSTOSCOPIC LITHOLAPAXY N/A 06/27/2019    Procedure: CYSTOLITHOLAPAXY;  Surgeon: Shireen Mayo MD;  Location: [facility] OR [address] [place] [address];  Service: Urology;  Laterality: N/A;    CYSTOSCOPIC LITHOLAPAXY N/A 09/03/2019    Procedure: CYSTOLITHOLAPAXY;  Surgeon: Shireen Mayo MD;  [facility];  Service: Urology;  Laterality: N/A;    CYSTOSCOPY N/A 07/13/2021    Procedure: CYSTOSCOPY;  Surgeon: Shireen Mayo MD;  Location: [facility] OR 1ST FLR;  Service: Urology;  Laterality: N/A;    CYSTOSCOPY   11/16/2021    Procedure: CYSTOSCOPY;  Surgeon: Shireen Mayo MD;  [facility];  Service: Urology;;    CYSTOSCOPY  07/19/2022    Procedure: CYSTOSCOPY;  Surgeon: Shireen Mayo MD;  [facility];  Service: Urology;;    CYSTOSCOPY WITH INJECTION OF PERIURETHRAL BULKING AGENT  07/19/2022    Procedure: CYSTOSCOPY, WITH PERIURETHRAL BULKING AGENT INJECTION-MACROPLASTIQUE;  Surgeon: Shireen Mayo MD;  Location: [address];  Service: Urology;;    CYSTOSCOPY WITH INJECTION OF PERIURETHRAL BULKING AGENT N/A 03/28/2023    Procedure: CYSTOSCOPY, WITH PERIURETHRAL BULKING AGENT INJECTION;  Surgeon: Shireen Mayo MD;  Location: [address];  Service: Urology;  Laterality: N/A;  [place]    CYSTOSCOPY WITH INJECTION OF PERIURETHRAL BULKING AGENT N/A 11/14/2023    Procedure: CYSTOSCOPY, WITH PERIURETHRAL BULKING AGENT INJECTION;  Surgeon: Shireen Mayo MD;  [address]  Service: Urology;  Laterality: N/A;    CYSTOSCOPY,WITH BOTULINUM TOXIN INJECTION N/A 12/13/2022    Procedure: CYSTOSCOPY,WITH BOTULINUM TOXIN INJECTION;  Surgeon: Shireen Mayo MD;  [address]  Service: Urology;  Laterality: N/A;  300 U    CYSTOSCOPY,WITH BOTULINUM TOXIN INJECTION N/A 03/28/2023    Procedure: CYSTOSCOPY,WITH BOTULINUM TOXIN INJECTION;  Surgeon: Shireen Mayo MD;  [address];  Service: Urology;  Laterality: N/A;  45 MIN.    300 UNITS    CYSTOSCOPY,WITH BOTULINUM TOXIN INJECTION N/A 12/19/2024    Procedure: CYSTOSCOPY,WITH BOTULINUM TOXIN INJECTION;  Surgeon: Florencio Armstrong MD;  Location: [facility];  Service: Urology;  Laterality: N/A;    ESOPHAGOGASTRODUODENOSCOPY N/A 05/23/2018    Procedure: ESOPHAGOGASTRODUODENOSCOPY (EGD);  Surgeon: Prince Vance MD;  Location: [address] FLR);  Service: Endoscopy;  Laterality: N/A;  r/s 'd per Dr. Vance due to family emergency- ER    ESOPHAGOGASTRODUODENOSCOPY N/A 06/23/2023    Procedure: EGD  (ESOPHAGOGASTRODUODENOSCOPY);  Surgeon: Juaquin Barry MD;  Location: [facility] ENDO (2ND FLR);  Service: Endoscopy;  Laterality: N/A;  Refferal: ROSEANNE MAX  tele enc 5/18/23  dx: history of a Nissen fundoplication  Additional Scheduling Information:  On home oxygen 2nd floor for airway protection also with a pain pump elevated BMI close to 40   Prep Gastroparesis   ins    ESOPHAGOGASTRODUODENOSCOPY N/A 08/12/2024    Procedure: EGD (ESOPHAGOGASTRODUODENOSCOPY);  Surgeon: Cat Cortés MD;  Location: [facility] ENDO (2ND FLR);  Service: Endoscopy;  Laterality: N/A;  referral dr max / prep inst mailed / gastropareisis/  8/5-called pt for pre call-unable to lv-portal message sent-tb-[employer]-intrathecal pain pump  8/7 precall complete -ml    HYSTERECTOMY  1975    endometriosis    INJECTION OF BOTULINUM TOXIN TYPE A  07/13/2021    Procedure: INJECTION, BOTULINUM TOXIN, 200units;  Surgeon: Shireen Mayo MD;  [facility];  Service: Urology;;    INJECTION OF BOTULINUM TOXIN TYPE A  11/16/2021    Procedure: INJECTION, BOTULINUM TOXIN, 200units;  Surgeon: Shireen Mayo MD;  [facility];  Service: Urology;;    INJECTION OF BOTULINUM TOXIN TYPE A  07/19/2022    Procedure: INJECTION, BOTULINUM TOXIN, 300 units ;  Surgeon: Shireen Mayo MD;  [facility];  Service: Urology;;    INJECTION OF BOTULINUM TOXIN TYPE A N/A 11/14/2023    Procedure: INJECTION, BOTULINUM TOXIN, TYPE A;  Surgeon: Shireen Mayo MD;  [facility];  Service: Urology;  Laterality: N/A;    INSERTION OF SUPRAPUBIC CATHETER      INSERTION, PUBOVAGINAL SLING, WITH CYSTOSCOPY N/A 1/30/2025    Procedure: INSERTION, PUBOVAGINAL SLING, WITH CYSTOSCOPY;  Surgeon: Florencio Armstrong MD;  [facility];  Service: Urology;  Laterality: N/A;    INSERTION, SUPRAPUBIC CATHETER N/A 12/13/2022    Procedure: INSERTION, SUPRAPUBIC CATHETER;  Surgeon: Shireen Mayo MD;   Location: [facility] OR 1ST FLR;  Service: Urology;  Laterality: N/A;  exchange    INSERTION, SUPRAPUBIC CATHETER N/A 11/14/2023    Procedure: INSERTION, SUPRAPUBIC CATHETER;  Surgeon: Shireen Mayo MD;  Location: [facility] OR 1ST FLR;  Service: Urology;  Laterality: N/A;  EXCHANGE of s/p tube    INSERTION, SUPRAPUBIC CATHETER N/A 1/30/2025    Procedure: INSERTION, SUPRAPUBIC CATHETER;  Surgeon: Florencio Armstrong MD;  Location: [facility] OR;  Service: Urology;  Laterality: N/A;  exchange    pain pump placement      SQ Dilaudid Pump managed by Dr. Hillman, Pain Management    REMOVAL OF BONE SPUR OF FOOT Bilateral 09/16/2022    Procedure: EXCISION ARTHRITIC BONE, BILATERAL FOOT;  Surgeon: Adam Mcguire DPM;  Location: [facility] OR;  Service: Podiatry;  Laterality: Bilateral;    REPLACEMENT OF BACLOFEN PUMP N/A 08/02/2023    Procedure: REPLACEMENT, BACLOFEN PUMP;  Surgeon: Smitha Condon MD;  Location: [facility] OR 2ND FLR;  Service: Neurosurgery;  Laterality: N/A;  Anes: Gen  Blood: Type & Screen  Pos: Left Lat  Intrathecal Pump  Bed: Reg Reversed  Rad: C-arm  Pump: 40 cc, [employer]tronics    REPLACEMENT OF CATHETER N/A 10/31/2019    Procedure: REPLACEMENT, CATHETER-SUPRAPUBIC;  Surgeon: Shireen Mayo MD;  Location: [facility] OR 1ST FLR;  Service: Urology;  Laterality: N/A;    SPINAL CORD STIMULATOR IMPLANT      SPINAL CORD STIMULATOR REMOVAL      before Larissa    SPINE SURGERY  05/13/2013    CERVICAL FUSION    TONSILLECTOMY       Family History   Problem Relation Name Age of Onset    Cancer Mother  55        breast    Cancer Father          esophagus,had laryngectomy    Esophageal cancer Father      Parkinsonism Maternal Grandmother      Tremor Maternal Grandmother      No Known Problems Brother      No Known Problems Brother      Heart disease Maternal Uncle klarissa     Colon cancer Maternal Uncle klarissa         Less than 60    No Known Problems Sister      No Known Problems Maternal Aunt      Cirrhosis Paternal Aunt          ETOH     Liver disease Paternal Aunt          ETOH    Liver disease Paternal Uncle          ETOH    Cirrhosis Paternal Uncle          ETOH    No Known Problems Maternal Grandfather      No Known Problems Paternal Grandmother      No Known Problems Paternal Grandfather      Melanoma Neg Hx      Amblyopia Neg Hx      Blindness Neg Hx      Cataracts Neg Hx      Diabetes Neg Hx      Glaucoma Neg Hx      Hypertension Neg Hx      Macular degeneration Neg Hx      Retinal detachment Neg Hx      Strabismus Neg Hx      Stroke Neg Hx      Thyroid disease Neg Hx      Celiac disease Neg Hx      Colon polyps Neg Hx      Cystic fibrosis Neg Hx      Crohn's disease Neg Hx      Inflammatory bowel disease Neg Hx      Liver cancer Neg Hx      Rectal cancer Neg Hx      Stomach cancer Neg Hx      Ulcerative colitis Neg Hx      Lymphoma Neg Hx       Social History[1]  Review of Systems   Constitutional:  Negative for fever.   HENT:  Negative for ear pain, rhinorrhea and sore throat.    Eyes:  Negative for pain and visual disturbance.   Respiratory:  Negative for cough and shortness of breath.    Cardiovascular:  Negative for chest pain.   Gastrointestinal:  Negative for abdominal pain, diarrhea, nausea and vomiting.   Genitourinary:         Suprapubic catheter   Musculoskeletal:  Negative for arthralgias.   Skin:  Negative for rash.   Neurological:  Negative for weakness, numbness and headaches.   All other systems reviewed and are negative.      Physical Exam     Initial Vitals [03/25/25 0108]   BP Pulse Resp Temp SpO2   123/63 78 18 98.2 °F (36.8 °C) 95 %      MAP       --         Physical Exam    Nursing note and vitals reviewed.  Constitutional: She appears well-developed and well-nourished.  Non-toxic appearance. No distress.   HENT:   Head: Normocephalic and atraumatic. Mouth/Throat: Oropharynx is clear and moist.   Eyes: Conjunctivae and EOM are normal. Pupils are equal, round, and reactive to light.   Neck: Neck supple.   Normal range of  motion.  Cardiovascular:  Normal rate, regular rhythm, normal heart sounds and intact distal pulses.     Exam reveals no gallop and no friction rub.       No murmur heard.  Pulmonary/Chest: Breath sounds normal. No respiratory distress. She has no decreased breath sounds. She has no wheezes. She has no rhonchi. She has no rales.   Abdominal: Abdomen is soft. Bowel sounds are normal. She exhibits no distension. There is no abdominal tenderness.   Genitourinary:    Genitourinary Comments: Suprapubic catheter in place.  There is some erythema around the catheter site consistent with a fungal infection.     Musculoskeletal:         General: Normal range of motion.      Cervical back: Normal range of motion and neck supple.     Neurological: She is alert and oriented to person, place, and time. She has normal strength.   Skin: Skin is warm and dry. Rash (fungal rash in the skin folds) noted.   Erythema in the skin folds under the breasts   Psychiatric: She has a normal mood and affect.         ED Course   Procedures  Labs Reviewed   BASIC METABOLIC PANEL - Abnormal       Result Value    Sodium 143      Potassium 3.8      Chloride 107      CO2 24      Glucose 87      BUN 7 (*)     Creatinine 1.0      Calcium 9.5      Anion Gap 12      eGFR >60     CBC WITH DIFFERENTIAL - Normal    WBC 7.45      RBC 4.31      HGB 12.4      HCT 38.1      MCV 88      MCH 28.8      MCHC 32.5      RDW 14.1      Platelet Count 248      MPV 9.3      Nucleated RBC 0      Neut % 58.7      Lymph % 29.0      Mono % 7.7      Eos % 3.9      Basophil % 0.4      Imm Grans % 0.3      Neut # 4.38      Lymph # 2.16      Mono # 0.57      Eos # 0.29      Baso # 0.03      Imm Grans # 0.02     CBC W/ AUTO DIFFERENTIAL    Narrative:     The following orders were created for panel order CBC Auto Differential.  Procedure                               Abnormality         Status                     ---------                               -----------         ------                      CBC with Differential[[id number]]       Normal              Final result                 Please view results for these tests on the individual orders.   URINALYSIS, REFLEX TO URINE CULTURE          Imaging Results    None          Medications   fluconazole tablet 200 mg (200 mg Oral Given 3/25/25 0302)     Medical Decision Making  Culture show Ana.  I will start the patient on fluconazole.  I switched her suprapubic catheter.  She had yellow urine return.  She needs to follow up with her urologist.  She needs to apply topical miconazole in addition to oral fluconazole.  She is stable for discharge.  No signs of sepsis.  No signs of urinary retention.  I did a bedside ultrasound which showed a decompressed bladder.  Vital signs are stable for discharge return precautions given.    Amount and/or Complexity of Data Reviewed  Labs: ordered.    Risk  OTC drugs.  Prescription drug management.                                      Clinical Impression:  Final diagnoses:  [T83.010A] Suprapubic catheter dysfunction, initial encounter (Primary)  [B37.41] Candida cystitis          ED Disposition Condition    Discharge           ED Prescriptions       Medication Sig Dispense Start Date End Date Auth. Provider    fluconazole (DIFLUCAN) 200 MG Tab Take 1 tablet (200 mg total) by mouth once daily. for 14 days 14 tablet 3/25/2025 4/8/2025 Alberto Barbosa MD    miconazole NITRATE 2 % (MICOTIN) 2 % top powder Apply topically 2 (two) times daily. Apply under breasts 85 g 3/25/2025 -- Alberto Barbosa MD          Follow-up Information       Follow up With Specialties Details Why Contact Info    Florencio Armstrong MD Urology Schedule an appointment as soon as possible for a visit  Call the urologist to discuss your urinary tract infection. [address] [place] [address]  [phone]      [facility] Emergency Dept Emergency Medicine  If symptoms worsen [address] [place] [address]  "[id number]  [phone]                 [1]   Social History  Tobacco Use    Smoking status: Never    Smokeless tobacco: Never   Substance Use Topics    Alcohol use: Never    Drug use: Yes     Types: Marijuana     Comment: "medical marijuana gummies"        Alberto Barbosa MD  03/25/25 0537    "

## 2025-03-25 NOTE — TELEPHONE ENCOUNTER
I returned pt's call, two attempts however each time I got a recording stating that the number is not in service. Patient has gone to the emergency room this morning.

## 2025-03-25 NOTE — ED NOTES
Pt presents to ED with c/o Hematuria. Pt reports having blood in her suprapubic catheter which has worsen within the last couple of days. Bright red blood in catheter. Pt also has pelvic pain and reports chills. No signs of respiratory distress noted.

## 2025-03-25 NOTE — TELEPHONE ENCOUNTER
----- Message from Marci sent at 3/25/2025  1:08 PM CDT -----  Type:  Sooner Appointment RequestCaller is requesting a sooner appointment.  Caller declined first available appointment listed below.  Caller will not accept being placed on the waitlist and is requesting a message be sent to doctor.Name of Caller:ptWhen is the first available appointment?07/01/25Symptoms:ER f/uWould the patient rather a call back or a response via [facility]? [facility] Call Back Number: [phone] Information:

## 2025-03-26 ENCOUNTER — PATIENT OUTREACH (OUTPATIENT)
Facility: OTHER | Age: 70
End: 2025-03-26
Payer: MEDICARE

## 2025-03-26 NOTE — PROGRESS NOTES
ED Navigator made an attempt to reach patient on 1/4/24 to assist with scheduling a post ED 7-day follow up with PCP. Unable to reach. Closing Encounter.    Merly Montoya

## 2025-03-27 NOTE — PROGRESS NOTES
Pt visited the ED on 3/25/25. I made 2 attempts to reach patient to assist with scheduling a post ED 7-day follow up with PCP. Unable to reach.  Closing Encounter    Merly Montoya

## 2025-04-01 DIAGNOSIS — G47.01 INSOMNIA DUE TO MEDICAL CONDITION: Chronic | ICD-10-CM

## 2025-04-01 NOTE — TELEPHONE ENCOUNTER
No care due was identified.  [facility] Embedded Care Due Messages. Reference number: [id number].   4/01/2025 3:48:23 PM CDT

## 2025-04-02 DIAGNOSIS — E03.9 HYPOTHYROIDISM (ACQUIRED): ICD-10-CM

## 2025-04-02 RX ORDER — TRAZODONE HYDROCHLORIDE 300 MG/1
150 TABLET ORAL NIGHTLY
Qty: 45 TABLET | Refills: 3 | Status: SHIPPED | OUTPATIENT
Start: 2025-04-02

## 2025-04-02 RX ORDER — AMITRIPTYLINE HYDROCHLORIDE 25 MG/1
25 TABLET, FILM COATED ORAL NIGHTLY PRN
Qty: 90 TABLET | Refills: 3 | Status: SHIPPED | OUTPATIENT
Start: 2025-04-02

## 2025-04-03 RX ORDER — LEVOTHYROXINE SODIUM 150 UG/1
150 TABLET ORAL
Qty: 30 TABLET | Refills: 2 | Status: SHIPPED | OUTPATIENT
Start: 2025-04-03 | End: 2025-07-02

## 2025-04-04 ENCOUNTER — TELEPHONE (OUTPATIENT)
Dept: ENDOCRINOLOGY | Facility: CLINIC | Age: 70
End: 2025-04-04
Payer: MEDICARE

## 2025-04-08 ENCOUNTER — PATIENT MESSAGE (OUTPATIENT)
Dept: OTOLARYNGOLOGY | Facility: CLINIC | Age: 70
End: 2025-04-08
Payer: MEDICARE

## 2025-04-16 ENCOUNTER — TELEPHONE (OUTPATIENT)
Dept: UROLOGY | Facility: CLINIC | Age: 70
End: 2025-04-16
Payer: MEDICARE

## 2025-04-16 DIAGNOSIS — N39.41 URGE INCONTINENCE: Primary | ICD-10-CM

## 2025-04-16 NOTE — TELEPHONE ENCOUNTER
----- Message from Camilla sent at 4/16/2025  4:35 PM CDT -----  Type:  Needs Medical AdviceWho Called: [facility] Call Back Number: [phone] Information: Patient is requesting a call back, she states she must be getting another Uti and would like to speak to a nurse about medication

## 2025-04-21 ENCOUNTER — PATIENT OUTREACH (OUTPATIENT)
Facility: OTHER | Age: 70
End: 2025-04-21
Payer: MEDICARE

## 2025-04-21 ENCOUNTER — TELEPHONE (OUTPATIENT)
Dept: UROLOGY | Facility: CLINIC | Age: 70
End: 2025-04-21
Payer: MEDICARE

## 2025-04-21 NOTE — TELEPHONE ENCOUNTER
----- Message from Jojo sent at 4/21/2025 11:24 AM CDT -----  Type: Patient Call Back Who called:Self  What is the request in detail:Pt stated he wasin the hospital recently she stated she have a UTI she is requesting a call back from office  Can the clinic reply by [facility]? No Would the patient rather a call back or a response via [facility]? Call back [facility] call back number:.[phone]  Additional Information: Thank you.

## 2025-04-21 NOTE — TELEPHONE ENCOUNTER
----- Message from Audrey sent at 4/21/2025  3:00 PM CDT -----  .Type:  Needs Medical AdviceWho Called: ptWould the patient rather a call back or a response via [facility]? Call [facility] Call Back Number: [phone] Information: Pt stated she have a bad UTI infection and would like medication called in please

## 2025-04-21 NOTE — TELEPHONE ENCOUNTER
Patient was calling for antibiotics for UTI, she was seen in [facility] ER, but did not know that the meds was already sent to pharmacy. Patient verbally understood.

## 2025-04-22 NOTE — PLAN OF CARE
AOX4. VS stable. Safety maintained. Meds given per MAR. Denies pain and N/V at this time. Suprapubic catheter in place draining yellow urine. IV antibiotics infused per orders. Cardiac diet with 1800ml fluid restriction maintained. Cardiac monitoring in place. Resting quietly. SR up X 2. Call light in reach. Bed alarm set. Will continue to monitor.         Problem: Adult Inpatient Plan of Care  Goal: Plan of Care Review  Outcome: Ongoing, Progressing  Goal: Patient-Specific Goal (Individualized)  Outcome: Ongoing, Progressing     Problem: UTI (Urinary Tract Infection)  Goal: Improved Infection Symptoms  Outcome: Ongoing, Progressing     Problem: Obstructive Sleep Apnea Risk or Actual Comorbidity Management  Goal: Unobstructed Breathing During Sleep  Outcome: Ongoing, Progressing     Problem: Pain Chronic (Persistent) (Comorbidity Management)  Goal: Acceptable Pain Control and Functional Ability  Outcome: Ongoing, Progressing      Resulted

## 2025-04-28 ENCOUNTER — TELEPHONE (OUTPATIENT)
Dept: UROLOGY | Facility: CLINIC | Age: 70
End: 2025-04-28
Payer: MEDICARE

## 2025-04-28 NOTE — TELEPHONE ENCOUNTER
----- Message from Lucia sent at 4/28/2025  2:36 PM CDT -----  Type:  Needs Medical AdviceWho Called: [facility]Would the patient rather a call back or a response via [facility]? Call [facility] Call Back Number: [phone] ext [phone] Information: [facility] reporting patient hasn't been available  for appointments and will need to be discharged.

## 2025-04-28 NOTE — TELEPHONE ENCOUNTER
[facility] reporting patient hasn't been available for appointments and will need to be discharged. I spoke with Kalina at [phone] ext [phone].

## 2025-05-06 ENCOUNTER — TELEPHONE (OUTPATIENT)
Dept: UROLOGY | Facility: CLINIC | Age: 70
End: 2025-05-06
Payer: MEDICARE

## 2025-05-06 NOTE — TELEPHONE ENCOUNTER
----- Message from Charbel Marrero sent at 5/6/2025 11:22 AM CDT -----    ----- Message -----  From: Clara Fuentes  Sent: 5/6/2025  11:19 AM CDT  To: Patti CARREON Staff    Type:  Pt Advice Who Called: Pt Does the patient know what this is regarding?: questions regarding catheter Would the patient rather a call back or a response via [facility]? Call [facility] Call Back Number:[phone] Additional Information:

## 2025-05-06 NOTE — TELEPHONE ENCOUNTER
Patient called and said  discharged her and she is overdue for an SPT change. She has appt on 5/16/25 and is asking for sooner appt. I told her 5/16/25 is absolutely the soonest appt we have but I would add her to the waitlist for cancellation.

## 2025-05-08 ENCOUNTER — TELEPHONE (OUTPATIENT)
Dept: UROLOGY | Facility: CLINIC | Age: 70
End: 2025-05-08
Payer: MEDICARE

## 2025-05-08 ENCOUNTER — HOSPITAL ENCOUNTER (EMERGENCY)
Facility: HOSPITAL | Age: 70
Discharge: HOME | End: 2025-05-09
Attending: EMERGENCY MEDICINE
Payer: MEDICARE

## 2025-05-08 DIAGNOSIS — N30.01 ACUTE CYSTITIS WITH HEMATURIA: Primary | ICD-10-CM

## 2025-05-08 LAB
ABSOLUTE EOSINOPHIL (OHS): 0.31 K/UL
ABSOLUTE MONOCYTE (OHS): 0.48 K/UL (ref 0.3–1)
ABSOLUTE NEUTROPHIL COUNT (OHS): 2.5 K/UL (ref 1.8–7.7)
ALBUMIN SERPL BCP-MCNC: 3.6 G/DL (ref 3.5–5.2)
ALP SERPL-CCNC: 108 UNIT/L (ref 40–150)
ALT SERPL W/O P-5'-P-CCNC: 9 UNIT/L (ref 10–44)
ANION GAP (OHS): 10 MMOL/L (ref 8–16)
AST SERPL-CCNC: 15 UNIT/L (ref 11–45)
BACTERIA #/AREA URNS AUTO: ABNORMAL /HPF
BASOPHILS # BLD AUTO: 0.02 K/UL
BASOPHILS NFR BLD AUTO: 0.4 %
BILIRUB SERPL-MCNC: 0.3 MG/DL (ref 0.1–1)
BILIRUB UR QL STRIP.AUTO: NEGATIVE
BUN SERPL-MCNC: 11 MG/DL (ref 8–23)
CALCIUM SERPL-MCNC: 8.8 MG/DL (ref 8.7–10.5)
CHLORIDE SERPL-SCNC: 103 MMOL/L (ref 95–110)
CLARITY UR: ABNORMAL
CO2 SERPL-SCNC: 25 MMOL/L (ref 23–29)
COLOR UR AUTO: YELLOW
CREAT SERPL-MCNC: 0.9 MG/DL (ref 0.5–1.4)
ERYTHROCYTE [DISTWIDTH] IN BLOOD BY AUTOMATED COUNT: 14.2 % (ref 11.5–14.5)
GFR SERPLBLD CREATININE-BSD FMLA CKD-EPI: >60 ML/MIN/1.73/M2
GLUCOSE SERPL-MCNC: 85 MG/DL (ref 70–110)
GLUCOSE UR QL STRIP: NEGATIVE
HCT VFR BLD AUTO: 34.2 % (ref 37–48.5)
HGB BLD-MCNC: 11.2 GM/DL (ref 12–16)
HGB UR QL STRIP: ABNORMAL
HYALINE CASTS UR QL AUTO: 0 /LPF (ref 0–1)
IMM GRANULOCYTES # BLD AUTO: 0.01 K/UL (ref 0–0.04)
IMM GRANULOCYTES NFR BLD AUTO: 0.2 % (ref 0–0.5)
KETONES UR QL STRIP: NEGATIVE
LEUKOCYTE ESTERASE UR QL STRIP: ABNORMAL
LYMPHOCYTES # BLD AUTO: 1.98 K/UL (ref 1–4.8)
MCH RBC QN AUTO: 28.9 PG (ref 27–31)
MCHC RBC AUTO-ENTMCNC: 32.7 G/DL (ref 32–36)
MCV RBC AUTO: 88 FL (ref 82–98)
MICROSCOPIC COMMENT: ABNORMAL
NITRITE UR QL STRIP: NEGATIVE
NUCLEATED RBC (/100WBC) (OHS): 1 /100 WBC
PH UR STRIP: 7 [PH]
PLATELET # BLD AUTO: 119 K/UL (ref 150–450)
PMV BLD AUTO: 12.7 FL (ref 9.2–12.9)
POTASSIUM SERPL-SCNC: 3.8 MMOL/L (ref 3.5–5.1)
PROT SERPL-MCNC: 7.2 GM/DL (ref 6–8.4)
PROT UR QL STRIP: ABNORMAL
RBC # BLD AUTO: 3.87 M/UL (ref 4–5.4)
RBC #/AREA URNS AUTO: >100 /HPF (ref 0–4)
RELATIVE EOSINOPHIL (OHS): 5.8 %
RELATIVE LYMPHOCYTE (OHS): 37.4 % (ref 18–48)
RELATIVE MONOCYTE (OHS): 9.1 % (ref 4–15)
RELATIVE NEUTROPHIL (OHS): 47.1 % (ref 38–73)
SODIUM SERPL-SCNC: 138 MMOL/L (ref 136–145)
SP GR UR STRIP: >=1.03
UROBILINOGEN UR STRIP-ACNC: NEGATIVE EU/DL
WBC # BLD AUTO: 5.3 K/UL (ref 3.9–12.7)
WBC #/AREA URNS AUTO: >100 /HPF (ref 0–5)

## 2025-05-08 PROCEDURE — 81003 URINALYSIS AUTO W/O SCOPE: CPT | Mod: HCNC | Performed by: EMERGENCY MEDICINE

## 2025-05-08 PROCEDURE — 51702 INSERT TEMP BLADDER CATH: CPT | Mod: HCNC

## 2025-05-08 PROCEDURE — 25500020 PHARM REV CODE 255: Mod: HCNC | Performed by: EMERGENCY MEDICINE

## 2025-05-08 PROCEDURE — 80053 COMPREHEN METABOLIC PANEL: CPT | Mod: HCNC

## 2025-05-08 PROCEDURE — 85025 COMPLETE CBC W/AUTO DIFF WBC: CPT | Mod: HCNC

## 2025-05-08 PROCEDURE — 99285 EMERGENCY DEPT VISIT HI MDM: CPT | Mod: 25,HCNC

## 2025-05-08 PROCEDURE — 87186 SC STD MICRODIL/AGAR DIL: CPT | Mod: HCNC | Performed by: EMERGENCY MEDICINE

## 2025-05-08 RX ADMIN — IOHEXOL 100 ML: 350 INJECTION, SOLUTION INTRAVENOUS at 10:05

## 2025-05-08 NOTE — TELEPHONE ENCOUNTER
----- Message from Med Assistant Grande sent at 5/8/2025  2:54 PM CDT -----    ----- Message -----  From: Almita Savage  Sent: 5/8/2025   2:47 PM CDT  To: Patti CARREON Staff    Type:  Needs Medical AdviceWho Called: PtSymptoms (please be specific): red (black) blood in her tube/[employer] How long has patient had these symptoms:  30 mins 40 minsPharmacy name and phone #:  Would the patient rather a call back or a response via [facility]? [facility] Call Back Number:  [phone] Information: pt is currently in office at Dr. Hillman in Pain management Advise by Dr. Hillman in Pain to reach out to Florencio Armstrong MD office regarding this matter

## 2025-05-09 VITALS
TEMPERATURE: 98 F | BODY MASS INDEX: 36.16 KG/M2 | HEART RATE: 60 BPM | HEIGHT: 66 IN | DIASTOLIC BLOOD PRESSURE: 70 MMHG | WEIGHT: 225 LBS | OXYGEN SATURATION: 99 % | RESPIRATION RATE: 25 BRPM | SYSTOLIC BLOOD PRESSURE: 119 MMHG

## 2025-05-09 LAB — HOLD SPECIMEN: NORMAL

## 2025-05-09 PROCEDURE — 96374 THER/PROPH/DIAG INJ IV PUSH: CPT | Mod: HCNC

## 2025-05-09 PROCEDURE — 63600175 PHARM REV CODE 636 W HCPCS: Mod: HCNC | Performed by: STUDENT IN AN ORGANIZED HEALTH CARE EDUCATION/TRAINING PROGRAM

## 2025-05-09 RX ORDER — NITROFURANTOIN 25; 75 MG/1; MG/1
100 CAPSULE ORAL 2 TIMES DAILY
Qty: 14 CAPSULE | Refills: 0 | Status: SHIPPED | OUTPATIENT
Start: 2025-05-09 | End: 2025-05-16

## 2025-05-09 RX ORDER — HYDROMORPHONE HYDROCHLORIDE 1 MG/ML
1 INJECTION, SOLUTION INTRAMUSCULAR; INTRAVENOUS; SUBCUTANEOUS
Refills: 0 | Status: COMPLETED | OUTPATIENT
Start: 2025-05-09 | End: 2025-05-09

## 2025-05-09 RX ADMIN — HYDROMORPHONE HYDROCHLORIDE 1 MG: 1 INJECTION, SOLUTION INTRAMUSCULAR; INTRAVENOUS; SUBCUTANEOUS at 02:05

## 2025-05-09 NOTE — ED NOTES
Pt. Has a suprapubic catheter. She stated it should have changed a week ago. She has dark, reddish urine with pressure in the suprapubic area and she has nausea. A/O X 4 , family at bed side.

## 2025-05-09 NOTE — ED NOTES
Suprapubic catheter removed by Kimberly TRACEY. New suprapubic catheter placed by Dr Rand . (22 Greek. ) New tubing and bag applied and secured to right upper thigh .Pt. Tolerated well. Obtained urine at 23:30 and sent specimen to lab.

## 2025-05-09 NOTE — ED NOTES
Urine collection delayed. Spoke to [facility] for a 20 in suprapubic catheter. There is none available in supply closets at this time. Will notify doctor

## 2025-05-09 NOTE — ED PROVIDER NOTES
Encounter Date: 5/8/2025       History     Chief Complaint   Patient presents with    Hematuria     C/O Dark blood coming from catheter. Pain lower back and abdominal. Rates pain 9/10. No fever endorsed. Overdue to change catheter.      Patient is a 69-year-old female with a past medical history of anxiety, arthritis, carotid artery occlusion in 2012, CHF, chronic pain syndrome, CAD, depression, detrusor overactivity, GERD, hyperlipidemia, hyponatremia, insomnia, anemia, lumbar radiculopathy, stress and urge incontinence, neurogenic bladder, emphysema, and sleep apnea who presents to emergency room for suprapubic discomfort and blood from suprapubic catheter.  Patient states that she is overdue for catheter change by a week and a half.  Started noticing blood with discomfort this morning.  No fever, body aches, or chills.    The history is provided by the patient. No  was used.     Review of patient's allergies indicates:   Allergen Reactions    (d)-limonene flavor      Other reaction(s): difficult intubation  Other reaction(s): Difficulty breathing    Benadryl [diphenhydramine hcl] Shortness Of Breath    Fentanyl Itching, Nausea And Vomiting and Swelling             Imitrex [sumatriptan succinate] Shortness Of Breath    Oxycodone-acetaminophen Shortness Of Breath    Sulfamethoxazole-trimethoprim Anaphylaxis    Topiramate Shortness Of Breath    Vancomycin Anaphylaxis     Rash    Butorphanol tartrate     Darvocet a500 [propoxyphene n-acetaminophen]      Other reaction(s): Difficulty breathing    Evening primrose (oenothera biennis)     Latex     Pregabalin Other (See Comments)     tremors    Sumatriptan     White petrolatum-zinc     Zinc acetate     Zinc oxide-white petrolatum      Other reaction(s): Difficulty breathing    Latex, natural rubber Itching and Rash    Phenytoin Rash and Other (See Comments)     Trouble breathing     Past Medical History:   Diagnosis Date    Anxiety     Arthritis      Bilateral lower extremity edema     severe chronic/lymphedema    Carotid artery occlusion 2012    39% stenosis to right and left    Cataract     chart states left cataract sx but pt is unsure    Cellulitis of left lower extremity 11/09/2024    pt states resolved    CHF (congestive heart failure)     Chronic pain syndrome     Constipation     Continuous leakage of urine     has suprapubic catheter    Coronary artery disease     subtotalled LAD with collateral    Depression     Detrusor overactivity     Encounter for blood transfusion     no reaction    Fever blister     GERD (gastroesophageal reflux disease)     Hard of hearing     Hyperlipidemia     Hypokalemia 01/09/2023    Hyponatremia 02/04/2022    Hypothyroid     Insomnia     Iron deficiency anemia     Lumbar radiculopathy     with chronic pain    Mixed stress and urge urinary incontinence     has suprapubic catheter    Neurogenic bladder     Ocular migraine     Other emphysema 05/22/2023    Patient has cystostomy     Renal disorder     Sleep apnea     no cpap    Yeast infection     candida in urine     Past Surgical History:   Procedure Laterality Date    ADENOIDECTOMY      BACK SURGERY      x 12    BACLOFEN PUMP IMPLANTATION      CARDIAC CATHETERIZATION  2016    subtotalled LAD with right to left collaterals    CATARACT EXTRACTION W/  INTRAOCULAR LENS IMPLANT Left     Dr Coleman     CATHETERIZATION, BLADDER N/A 12/19/2024    Procedure: CATHETERIZATION, BLADDER;  Surgeon: Florencio Armstrong MD;  Location: [facility] OR;  Service: Urology;  Laterality: N/A;  suprapubic catheter exchange    COLONOSCOPY      more than one    CYSTOSCOPIC LITHOLAPAXY N/A 06/27/2019    Procedure: CYSTOLITHOLAPAXY;  Surgeon: Shireen Mayo MD;  Location: [address];  Service: Urology;  Laterality: N/A;    CYSTOSCOPIC LITHOLAPAXY N/A 09/03/2019    Procedure: CYSTOLITHOLAPAXY;  Surgeon: Shireen Mayo MD;  Location: [facility] OR [address] [place] [address];  Service: Urology;  Laterality: N/A;    CYSTOSCOPY  N/A 07/13/2021    Procedure: CYSTOSCOPY;  Surgeon: Shireen Mayo MD;  [facility];  Service: Urology;  Laterality: N/A;    CYSTOSCOPY  11/16/2021    Procedure: CYSTOSCOPY;  Surgeon: Shireen Mayo MD;  [facility];  Service: Urology;;    CYSTOSCOPY  07/19/2022    Procedure: CYSTOSCOPY;  Surgeon: Shireen Mayo MD;  [facility];  Service: Urology;;    CYSTOSCOPY WITH INJECTION OF PERIURETHRAL BULKING AGENT  07/19/2022    Procedure: CYSTOSCOPY, WITH PERIURETHRAL BULKING AGENT INJECTION-MACROPLASTIQUE;  Surgeon: Shireen Mayo MD;  Location: [facility] OR [address] [place] [address];  Service: Urology;;    CYSTOSCOPY WITH INJECTION OF PERIURETHRAL BULKING AGENT N/A 03/28/2023    Procedure: CYSTOSCOPY, WITH PERIURETHRAL BULKING AGENT INJECTION;  Surgeon: Shireen Mayo MD;  Location: [facility] OR [address] [place] [address];  Service: Urology;  Laterality: N/A;  [place]    CYSTOSCOPY WITH INJECTION OF PERIURETHRAL BULKING AGENT N/A 11/14/2023    Procedure: CYSTOSCOPY, WITH PERIURETHRAL BULKING AGENT INJECTION;  Surgeon: Shireen Mayo MD;  Location: [facility] OR [address] [place] [address];  Service: Urology;  Laterality: N/A;    CYSTOSCOPY,WITH BOTULINUM TOXIN INJECTION N/A 12/13/2022    Procedure: CYSTOSCOPY,WITH BOTULINUM TOXIN INJECTION;  Surgeon: Shireen Mayo MD;  Location: [facility] OR [address] [place] [address];  Service: Urology;  Laterality: N/A;  300 U    CYSTOSCOPY,WITH BOTULINUM TOXIN INJECTION N/A 03/28/2023    Procedure: CYSTOSCOPY,WITH BOTULINUM TOXIN INJECTION;  Surgeon: Shireen Mayo MD;  Location: [facility] OR [address] [place] [address];  Service: Urology;  Laterality: N/A;  45 MIN.    300 UNITS    CYSTOSCOPY,WITH BOTULINUM TOXIN INJECTION N/A 12/19/2024    Procedure: CYSTOSCOPY,WITH BOTULINUM TOXIN INJECTION;  Surgeon: Florencio Armstrong MD;  Location: [facility];  Service: Urology;  Laterality: N/A;    ESOPHAGOGASTRODUODENOSCOPY N/A 05/23/2018    Procedure: ESOPHAGOGASTRODUODENOSCOPY (EGD);  Surgeon: Prince Vance MD;  Location: [facility] ([address] [place] [address]);   Service: Endoscopy;  Laterality: N/A;  r/s 'd per Dr. Max due to family emergency- ER    ESOPHAGOGASTRODUODENOSCOPY N/A 06/23/2023    Procedure: EGD (ESOPHAGOGASTRODUODENOSCOPY);  Surgeon: Juaquin Barry MD;  Location: [facility] (2ND FLR);  Service: Endoscopy;  Laterality: N/A;  Refferal: ROSEANNE MAX  Order  tele enc 5/18/23  dx: history of a Nissen fundoplication  Additional Scheduling Information:  On home oxygen 2nd floor for airway protection also with a pain pump elevated BMI close to 40   Prep Gastroparesis   ins    ESOPHAGOGASTRODUODENOSCOPY N/A 08/12/2024    Procedure: EGD (ESOPHAGOGASTRODUODENOSCOPY);  Surgeon: Cat Cortés MD;  Location: [facility] ENDO (2ND FLR);  Service: Endoscopy;  Laterality: N/A;  referral dr max / prep inst mailed / gastropareisis/  8/5-called pt for pre call-unable to lvm-portal message sent-tb-[facility]-intrathecal pain pump  8/7 precall complete -ml    HYSTERECTOMY  1975    endometriosis    INJECTION OF BOTULINUM TOXIN TYPE A  07/13/2021    Procedure: INJECTION, BOTULINUM TOXIN, 200units;  Surgeon: Shireen Mayo MD;  [facility];  Service: Urology;;    INJECTION OF BOTULINUM TOXIN TYPE A  11/16/2021    Procedure: INJECTION, BOTULINUM TOXIN, 200units;  Surgeon: Shireen Mayo MD;  [facility];  Service: Urology;;    INJECTION OF BOTULINUM TOXIN TYPE A  07/19/2022    Procedure: INJECTION, BOTULINUM TOXIN, 300 units ;  Surgeon: Shireen Mayo MD;  [facility];  Service: Urology;;    INJECTION OF BOTULINUM TOXIN TYPE A N/A 11/14/2023    Procedure: INJECTION, BOTULINUM TOXIN, TYPE A;  Surgeon: Shireen Mayo MD;  [facility];  Service: Urology;  Laterality: N/A;    INSERTION OF SUPRAPUBIC CATHETER      INSERTION, PUBOVAGINAL SLING, WITH CYSTOSCOPY N/A 1/30/2025    Procedure: INSERTION, PUBOVAGINAL SLING, WITH CYSTOSCOPY;  Surgeon: Florencio Armstrong MD;  Location: [facility];  Service: Urology;   Laterality: N/A;    INSERTION, SUPRAPUBIC CATHETER N/A 12/13/2022    Procedure: INSERTION, SUPRAPUBIC CATHETER;  Surgeon: Shireen Mayo MD;  Location: [facility] OR 1ST FLR;  Service: Urology;  Laterality: N/A;  exchange    INSERTION, SUPRAPUBIC CATHETER N/A 11/14/2023    Procedure: INSERTION, SUPRAPUBIC CATHETER;  Surgeon: Shireen Mayo MD;  Location: [facility] OR 1ST FLR;  Service: Urology;  Laterality: N/A;  EXCHANGE of s/p tube    INSERTION, SUPRAPUBIC CATHETER N/A 1/30/2025    Procedure: INSERTION, SUPRAPUBIC CATHETER;  Surgeon: Florencio Armstrong MD;  Location: [facility] OR;  Service: Urology;  Laterality: N/A;  exchange    pain pump placement      SQ Dilaudid Pump managed by Dr. Hillman, Pain Management    REMOVAL OF BONE SPUR OF FOOT Bilateral 09/16/2022    Procedure: EXCISION ARTHRITIC BONE, BILATERAL FOOT;  Surgeon: NICOLA Mcgrath;  Location: [facility] OR;  Service: Podiatry;  Laterality: Bilateral;    REPLACEMENT OF BACLOFEN PUMP N/A 08/02/2023    Procedure: REPLACEMENT, BACLOFEN PUMP;  Surgeon: Smitha Condon MD;  Location: [facility] OR 2ND FLR;  Service: Neurosurgery;  Laterality: N/A;  Anes: Gen  Blood: Type & Screen  Pos: Left Lat  Intrathecal Pump  Bed: Reg Reversed  Rad: C-arm  Pump: 40 cc, [employer]    REPLACEMENT OF CATHETER N/A 10/31/2019    Procedure: REPLACEMENT, CATHETER-SUPRAPUBIC;  Surgeon: Shireen Mayo MD;  Location: [facility] OR [address] [place] [address];  Service: Urology;  Laterality: N/A;    SPINAL CORD STIMULATOR IMPLANT      SPINAL CORD STIMULATOR REMOVAL      before Larissa    SPINE SURGERY  05/13/2013    CERVICAL FUSION    TONSILLECTOMY       Family History   Problem Relation Name Age of Onset    Cancer Mother  55        breast    Cancer Father          esophagus,had laryngectomy    Esophageal cancer Father      Parkinsonism Maternal Grandmother      Tremor Maternal Grandmother      No Known Problems Brother      No Known Problems Brother      Heart disease Maternal Uncle klarissa     Colon cancer Maternal  Uncle klarissa         Less than 60    No Known Problems Sister      No Known Problems Maternal Aunt      Cirrhosis Paternal Aunt          ETOH    Liver disease Paternal Aunt          ETOH    Liver disease Paternal Uncle          ETOH    Cirrhosis Paternal Uncle          ETOH    No Known Problems Maternal Grandfather      No Known Problems Paternal Grandmother      No Known Problems Paternal Grandfather      Melanoma Neg Hx      Amblyopia Neg Hx      Blindness Neg Hx      Cataracts Neg Hx      Diabetes Neg Hx      Glaucoma Neg Hx      Hypertension Neg Hx      Macular degeneration Neg Hx      Retinal detachment Neg Hx      Strabismus Neg Hx      Stroke Neg Hx      Thyroid disease Neg Hx      Celiac disease Neg Hx      Colon polyps Neg Hx      Cystic fibrosis Neg Hx      Crohn's disease Neg Hx      Inflammatory bowel disease Neg Hx      Liver cancer Neg Hx      Rectal cancer Neg Hx      Stomach cancer Neg Hx      Ulcerative colitis Neg Hx      Lymphoma Neg Hx       Social History[1]  Review of Systems   Constitutional:  Negative for chills, diaphoresis, fatigue and fever.   HENT:  Negative for congestion, sore throat and trouble swallowing.    Respiratory:  Negative for cough and shortness of breath.    Cardiovascular:  Negative for chest pain and palpitations.   Gastrointestinal:  Positive for abdominal pain (suprapubic discomfort). Negative for blood in stool, constipation, diarrhea, nausea and vomiting.   Genitourinary:  Positive for hematuria. Negative for difficulty urinating, dysuria, frequency and urgency.   Musculoskeletal:  Negative for back pain and myalgias.   Skin:  Negative for rash and wound.   Neurological:  Negative for weakness, light-headedness, numbness and headaches.       Physical Exam     Initial Vitals [05/08/25 1903]   BP Pulse Resp Temp SpO2   (!) 112/59 74 18 97.7 °F (36.5 °C) (!) 94 %      MAP       --         Physical Exam    Nursing note and vitals reviewed.  Constitutional: She appears  well-developed and well-nourished. She is not diaphoretic. No distress.   HENT:   Head: Normocephalic and atraumatic.   Right Ear: External ear normal.   Left Ear: External ear normal.   Eyes: Conjunctivae and EOM are normal.   Neck: Neck supple.   Normal range of motion.  Pulmonary/Chest: No respiratory distress.   Abdominal: There is abdominal tenderness.   No obvious drainage from the site.  Patient does have some surrounding erythema.  Tubing in place showing hematuria.        Musculoskeletal:         General: Edema (bilateral lower extremity) present. No tenderness. Normal range of motion.      Cervical back: Normal range of motion and neck supple.     Neurological: She is alert and oriented to person, place, and time. She has normal strength.   Skin: Skin is warm.   Psychiatric: She has a normal mood and affect. Her behavior is normal. Thought content normal.         ED Course   Urinary Catheter    Date/Time: 5/9/2025 3:03 PM  Location procedure was performed: [facility] EMERGENCY DEPARTMENT    Performed by: Sherrell Murray PA-C  Authorized by: Jose Rand MD  Assisting provider: Jose Rand MD  Pre-operative diagnosis: Suprapubic catheter malfunction  Post-operative diagnosis: Suprapubic catheter malfunction  Consent Done: Yes  Consent: Verbal consent obtained  Risks and benefits: risks, benefits and alternatives were discussed  Consent given by: patient  Patient identity confirmed: verbally with patient  Indications: catheter change, neurogenic bladder and urine specimen collection  Local anesthesia used: no    Anesthesia:  Local anesthesia used: no    Patient sedated: no  Preparation: Patient was prepped and draped in the usual sterile fashion.  Description of findings: Suprapubic catheter in place with surrounding tenderness   Catheter insertion: indwelling  Catheter type: coude  Catheter size: 22 Fr  Complicated insertion: no  Altered anatomy: no  Bladder irrigation: no  Number of attempts: 1  Technical  procedures used: Suprapubic catheter placement  Significant surgical tasks conducted by the assistant(s): Suprapubic catheter placement  Complications: No  Estimated blood loss (mL): 0  Specimens: No  Implants: No  Patient tolerance: Patient tolerated the procedure well with no immediate complications  Comments: Patient suprapubic catheter replaced with 22F.         Labs Reviewed   URINALYSIS, REFLEX TO URINE CULTURE - Abnormal       Result Value    Color, UA Yellow      Appearance, UA Cloudy (*)     pH, UA 7.0      Spec Grav UA >=1.030 (*)     Protein, UA 2+ (*)     Glucose, UA Negative      Ketones, UA Negative      Bilirubin, UA Negative      Blood, UA 3+ (*)     Nitrites, UA Negative      Urobilinogen, UA Negative      Leukocyte Esterase, UA 3+ (*)    COMPREHENSIVE METABOLIC PANEL - Abnormal    Sodium 138      Potassium 3.8      Chloride 103      CO2 25      Glucose 85      BUN 11      Creatinine 0.9      Calcium 8.8      Protein Total 7.2      Albumin 3.6      Bilirubin Total 0.3            AST 15      ALT 9 (*)     Anion Gap 10      eGFR >60     CBC WITH DIFFERENTIAL - Abnormal    WBC 5.30      RBC 3.87 (*)     HGB 11.2 (*)     HCT 34.2 (*)     MCV 88      MCH 28.9      MCHC 32.7      RDW 14.2      Platelet Count 119 (*)     MPV 12.7      Nucleated RBC 1 (*)     Neut % 47.1      Lymph % 37.4      Mono % 9.1      Eos % 5.8      Basophil % 0.4      Imm Grans % 0.2      Neut # 2.50      Lymph # 1.98      Mono # 0.48      Eos # 0.31      Baso # 0.02      Imm Grans # 0.01     URINALYSIS MICROSCOPIC - Abnormal    RBC, UA >100 (*)     WBC, UA >100 (*)     Bacteria, UA Occasional      Hyaline Casts, UA 0      Microscopic Comment       CULTURE, URINE   CBC W/ AUTO DIFFERENTIAL    Narrative:     The following orders were created for panel order CBC auto differential.  Procedure                               Abnormality         Status                     ---------                               -----------          ------                     CBC with Differential[[id number]]       Abnormal            Final result                 Please view results for these tests on the individual orders.   GREY TOP URINE HOLD    Extra Tube Hold for add-ons.            Imaging Results              CT Abdomen Pelvis With IV Contrast NO Oral Contrast (Final result)  Result time 05/09/25 01:21:00      Final result by Miranda Hernadez MD (05/09/25 01:21:00)                   Impression:      No convincing acute abnormality in the abdomen or pelvis as imaged.    Suprapubic catheter is noted within a collapsed bladder.  Bladder wall thickening is expected secondary to collapsed state however correlation is needed for cystitis.  Kidneys are unremarkable as imaged.    Additional incidental nonacute findings as detailed above.      Electronically signed by: Miranda Hernadez  Date:    05/09/2025  Time:    01:21               Narrative:    EXAMINATION:  CT ABDOMEN PELVIS WITH IV CONTRAST    CLINICAL HISTORY:  UTI, recurrent/complicated (Female);    TECHNIQUE:  Low dose axial images, sagittal and coronal reformations were obtained from the lung bases to the pubic symphysis following the IV administration of 100 mL of Omnipaque 350 .  Oral contrast was not administered.    COMPARISON:  CT abdomen pelvis 03/08/2025, 03/10/2024    FINDINGS:  Abdomen:    - Lower thorax:The imaged heart is not significantly enlarged.  There is no pericardial effusion or pleural effusion.    - Lung bases: Bands of atelectasis versus scarring are noted in the left lung base.  Lungs otherwise appear clear.    - Liver: The liver is homogeneous and not significantly enlarged.    - Gallbladder: There is no CT evidence of cholelithiasis or cholecystitis.    - Bile Ducts: No evidence of intra or extra hepatic biliary ductal dilation.    - Spleen: Negative.    - Kidneys: Renal cortices enhance symmetrically.  There is no evidence of nephrolithiasis or hydronephrosis.    - Adrenals:  Unremarkable.    - Pancreas: No mass or peripancreatic fat stranding.    - Retroperitoneum:  No significant adenopathy.    - Vascular: No abdominal aortic aneurysm.    - Abdominal wall:  There is no the abdominal wall hernia.    Pelvis:    Suprapubic catheter is noted with the balloon within the bladder.  Bladder is collapsed around the balloon with relative wall thickening possibly related to state.  No convincing pericolonic cystic inflammation in the pelvis.  There is no pelvic mass, adenopathy, or free fluid.    Bowel/Mesentery:    There is a small hiatal hernia.  Stomach otherwise is unremarkable.  Small bowel is nondistended.  There is mild stool burden constipation in the colon more so in the proximal colon.  Appendix is normal.  There is no mesenteric adenopathy, inflammation, extraluminal free air or free fluid in the abdomen and pelvis.    Bones:  No acute osseous abnormality and no suspicious lytic or blastic lesion.  Postoperative changes bilateral transpedicular fusion involving L 3 through S1 with hardware artifact limiting evaluation.  There is also dextroscoliosis of the lumbar spine.  No evidence of acute compression fractures or appreciable suspicious osseous lesions.                                       Medications   iohexoL (OMNIPAQUE 350) injection 100 mL (100 mLs Intravenous Given 5/8/25 5964)   HYDROmorphone injection 1 mg (1 mg Intravenous Given 5/9/25 4903)     Medical Decision Making  Patient presents to emergency room for hematuria and super pubic discomfort.  Vital signs stable.  Physical exam as stated above.    Differential diagnosis includes but is not limited to urinary tract infection, pyelonephritis, Motley misplacement, Motley malfunction, among others.  Lab work grossly unremarkable.  Suprapubic catheter was replaced in the emergency room without acute complications.  Pending urinalysis and CT abdomen at this time.    Patient handed over to supervising physician at shift change.   Anticipate discharge with urology follow-up.  She is stable at this time.    Problems Addressed:  Acute cystitis with hematuria: acute illness or injury    Amount and/or Complexity of Data Reviewed  External Data Reviewed: notes.     Details: Patient last seen in the emergency room on 04/17/2025 and diagnosed with UTI at that time.  It appears that she has been discharged from home health.  Labs: ordered. Decision-making details documented in ED Course.  Radiology: ordered.    Risk  Prescription drug management.  Risk Details: Comorbidities taken into consideration during the patient's evaluation and treatment include anxiety, arthritis, carotid artery occlusion in 2012, CHF, chronic pain syndrome, CAD, depression, detrusor overactivity, GERD, hyperlipidemia, hyponatremia, insomnia, anemia, lumbar radiculopathy, stress and urge incontinence, neurogenic bladder, emphysema, and sleep apnea.  Social determinants of health taken into consideration during development of our treatment plan include difficulty in obtaining follow-up, obtaining medications, health literacy, access to healthy options for preventative/conservative management, and/or support systems due to, but not limited to, transportation limitations, socioeconomic status, and environmental factors.                ED Course as of 05/09/25 1507   Thu May 08, 2025   2258 Comprehensive metabolic panel(!)  CMP relatively unremarkable.  Electrolytes within normal limits.  BUN and creatinine within normal limits.  LFTs within normal limits. [BJ]   2258 CBC auto differential(!)  CBC without leukocytosis.  Hemoglobin stable at 11.2. [BJ]   4426 Patient care transferred to Dr. Rand at shift change. I discussed the relevant history, physical exam, laboratory findings, radiological findings and anticipated disposition plan. On coming staff to formally complete visit disposition, review any pending laboratory/radiological results and to addend treatment plan as  "necessary. [BJ]   Fri May 09, 2025   0017 Leukocyte Esterase, UA(!): 3+ [KB]   0017 WBC, UA(!): >100  Prior cultures with multiple resistances, patient also with numerous allergies. Best option to treat appears to be macrobid. [KB]      ED Course User Index  [BJ] Sherrell Murray PA-C  [KB] Jose Rand MD                           Clinical Impression:  Final diagnoses:  [N30.01] Acute cystitis with hematuria (Primary)          ED Disposition Condition    Discharge Stable          ED Prescriptions       Medication Sig Dispense Start Date End Date Auth. Provider    nitrofurantoin, macrocrystal-monohydrate, (MACROBID) 100 MG capsule Take 1 capsule (100 mg total) by mouth 2 (two) times daily. for 7 days 14 capsule 5/9/2025 5/16/2025 Jose Rand MD          Follow-up Information       Follow up With Specialties Details Why Contact Info    Florencio Armstrong MD Urology Schedule an appointment as soon as possible for a visit in 1 week For follow-up on today's visit. [address] [place] [id number]  [phone]      [facility] Emergency Dept Emergency Medicine Go to  As needed, If symptoms worsen [address] [facility] [address] [phone]            This note was partially created using [employer] Voice Recognition software. Typographical and content errors may occur with this process. While efforts are made to detect and correct such errors, in some cases errors will persist. For this reason, wording in this document should be considered in the proper context and not strictly verbatim.          [1]   Social History  Tobacco Use    Smoking status: Never    Smokeless tobacco: Never   Substance Use Topics    Alcohol use: Never    Drug use: Yes     Types: Marijuana     Comment: "medical marijuana gummies"        Sherrell Murray PA-C  05/09/25 [id number]    "

## 2025-05-10 ENCOUNTER — RESULTS FOLLOW-UP (OUTPATIENT)
Dept: EMERGENCY MEDICINE | Facility: HOSPITAL | Age: 70
End: 2025-05-10

## 2025-05-13 LAB — BACTERIA UR CULT: ABNORMAL

## 2025-05-16 ENCOUNTER — OFFICE VISIT (OUTPATIENT)
Dept: UROLOGY | Facility: CLINIC | Age: 70
End: 2025-05-16
Payer: MEDICARE

## 2025-05-16 VITALS — SYSTOLIC BLOOD PRESSURE: 110 MMHG | HEART RATE: 80 BPM | DIASTOLIC BLOOD PRESSURE: 56 MMHG

## 2025-05-16 DIAGNOSIS — Z43.5 ATTENTION TO CYSTOSTOMY: Primary | ICD-10-CM

## 2025-05-16 DIAGNOSIS — Z93.59 SUPRAPUBIC CATHETER: ICD-10-CM

## 2025-05-16 DIAGNOSIS — N32.81 DETRUSOR OVERACTIVITY: ICD-10-CM

## 2025-05-16 LAB
BACTERIA #/AREA URNS AUTO: ABNORMAL /HPF
BILIRUB UR QL STRIP.AUTO: NEGATIVE
CLARITY UR: CLEAR
COLOR UR AUTO: YELLOW
GLUCOSE UR QL STRIP: NEGATIVE
HGB UR QL STRIP: ABNORMAL
KETONES UR QL STRIP: NEGATIVE
LEUKOCYTE ESTERASE UR QL STRIP: ABNORMAL
MICROSCOPIC COMMENT: ABNORMAL
NITRITE UR QL STRIP: NEGATIVE
PH UR STRIP: 5 [PH]
PROT UR QL STRIP: ABNORMAL
RBC #/AREA URNS AUTO: >100 /HPF (ref 0–4)
SP GR UR STRIP: 1.02
UROBILINOGEN UR STRIP-ACNC: NEGATIVE EU/DL
WBC #/AREA URNS AUTO: >100 /HPF (ref 0–5)

## 2025-05-16 PROCEDURE — 99999 PR PBB SHADOW E&M-EST. PATIENT-LVL III: CPT | Mod: PBBFAC,HCNC,, | Performed by: UROLOGY

## 2025-05-16 PROCEDURE — 87086 URINE CULTURE/COLONY COUNT: CPT | Mod: 91,HCNC | Performed by: UROLOGY

## 2025-05-16 PROCEDURE — 81003 URINALYSIS AUTO W/O SCOPE: CPT | Mod: HCNC | Performed by: UROLOGY

## 2025-05-16 RX ORDER — DARIFENACIN 7.5 MG/1
15 TABLET, EXTENDED RELEASE ORAL DAILY
Qty: 90 TABLET | Refills: 3 | Status: SHIPPED | OUTPATIENT
Start: 2025-05-16

## 2025-05-16 NOTE — PROGRESS NOTES
Subjective:      Patient ID: Tasha Hawley is a 69 y.o. female.    Chief Complaint: No chief complaint on file.    Mrs Hawley this 69-year-old female with a history of neurogenic bladder who has as suprapubic tube.  She had continuous leakage with urge urinary incontinence and overactive bladder and overflow incontinence.  The patient underwent sling procedure but still has some incontinence.  The patient was informed that sling can not be made any tighter and that it would not be appropriate to close off the urethra as we would lose access to the kidneys.  The patient has much improved since initial presentation.  Recently fired from [employer] for not being available when the nurse came for suprapubic tube change and evaluation.  Her suprapubic change was done last week in the hospital emergency room secondary to and bleeding in the tube and around the tube.  The patient's urine looks good today.  She was placed on antibiotics for a week.  Will increase Enablex to 15 mg daily and see if this helps with her urinary urgency, urge incontinence and leakage as it appears that this is related to compliance and bladder spasm and not necessarily due to JUNAID.  Patient is using a pad or pull up.  It has decreased the number of pads and pull-ups required since having sling performed..    Follow-up  This is a chronic (The patient and follow up for neurogenic bladder with urge urinary incontinence and suprapubic tube.) problem. The current episode started more than 1 year ago. The problem occurs constantly. The problem has been gradually improving. Associated symptoms include arthralgias (poor mobility secondary to back pain and lower extremity weakness), urinary symptoms (urinary frequency urgency and urge urinary incontinence) and weakness. Pertinent negatives include no abdominal pain, chest pain, chills, congestion, coughing, fatigue, fever, headaches, nausea, rash, sore throat or vomiting. Nothing aggravates the  symptoms. Treatments tried: SP tube change his every 6 weeks in clinic as well as sling procedure for incontinence. The treatment provided significant relief.     Review of Systems   Constitutional:  Negative for activity change, appetite change, chills, fatigue and fever.   HENT:  Negative for congestion, ear pain, hearing loss, nosebleeds, sinus pressure, sore throat and trouble swallowing.    Eyes:  Negative for pain and visual disturbance.   Respiratory:  Negative for apnea, cough and shortness of breath.    Cardiovascular:  Negative for chest pain and leg swelling.   Gastrointestinal:  Negative for abdominal distention, abdominal pain, anal bleeding, blood in stool, constipation, diarrhea, nausea, rectal pain and vomiting.   Endocrine: Negative for cold intolerance, heat intolerance, polydipsia, polyphagia and polyuria.   Genitourinary:  Negative for decreased urine volume, difficulty urinating, dyspareunia, dysuria, enuresis, flank pain, frequency, genital sores, hematuria, menstrual problem, pelvic pain, urgency, vaginal bleeding, vaginal discharge and vaginal pain.   Musculoskeletal:  Positive for arthralgias (poor mobility secondary to back pain and lower extremity weakness). Negative for back pain.   Skin:  Negative for color change, pallor and rash.   Allergic/Immunologic: Negative for environmental allergies, food allergies and immunocompromised state.   Neurological:  Positive for weakness. Negative for dizziness, speech difficulty and headaches.   Hematological:  Negative for adenopathy. Does not bruise/bleed easily.   Psychiatric/Behavioral: Negative.        Objective:     Physical Exam  Vitals and nursing note reviewed.   Constitutional:       General: She is not in acute distress.     Appearance: Normal appearance. She is well-developed. She is not ill-appearing, toxic-appearing or diaphoretic.   HENT:      Head: Normocephalic and atraumatic.      Right Ear: External ear normal.      Left Ear:  External ear normal.      Nose: Nose normal. No congestion or rhinorrhea.      Mouth/Throat:      Mouth: Mucous membranes are moist.      Pharynx: Oropharynx is clear. No oropharyngeal exudate or posterior oropharyngeal erythema.   Eyes:      General: No scleral icterus.        Right eye: No discharge.         Left eye: No discharge.      Extraocular Movements: Extraocular movements intact.      Conjunctiva/sclera: Conjunctivae normal.      Pupils: Pupils are equal, round, and reactive to light.   Cardiovascular:      Rate and Rhythm: Normal rate and regular rhythm.      Heart sounds: Normal heart sounds.   Pulmonary:      Effort: Pulmonary effort is normal.   Abdominal:      General: Bowel sounds are normal. There is no distension.      Palpations: Abdomen is soft. There is no mass.      Tenderness: There is no abdominal tenderness. There is no right CVA tenderness, left CVA tenderness, guarding or rebound.      Hernia: No hernia is present.   Genitourinary:     Comments: SP tube in place.  Site clear.  Urine clear basilio with no debris in tubing or bag.  Musculoskeletal:         General: Normal range of motion.      Cervical back: Normal range of motion and neck supple.   Skin:     General: Skin is warm and dry.      Capillary Refill: Capillary refill takes 2 to 3 seconds.   Neurological:      Mental Status: She is alert and oriented to person, place, and time. Mental status is at baseline.      Motor: Weakness present.      Gait: Gait abnormal.      Deep Tendon Reflexes: Reflexes are normal and symmetric.   Psychiatric:         Behavior: Behavior normal.         Thought Content: Thought content normal.         Judgment: Judgment normal.        Assessment:      1. Attention to cystostomy    2. Detrusor overactivity    3. Suprapubic catheter      Plan:     Patient Instructions   Follow up in about 5 weeks for SP tube change  Give patient stat locks to replace hers when they come off her skin.  Also give patient  catheter bags to change every 2 weeks.  Patient to increase Enablex into 15 mg daily.   Obtain urinalysis and urine culture from urine from tube.  Notify patient of results.  Placed on antibiotics if necessary.  Consider chronic treatment with methenamine or Urex in order to prevent recurrent urinary tract infection.

## 2025-05-16 NOTE — PATIENT INSTRUCTIONS
Follow up in about 5 weeks for SP tube change  Give patient stat locks to replace hers when they come off her skin.  Also give patient catheter bags to change every 2 weeks.  Patient to increase Enablex into 15 mg daily.   Obtain urinalysis and urine culture from urine from tube.  Notify patient of results.  Placed on antibiotics if necessary.  Consider chronic treatment with methenamine or Urex in order to prevent recurrent urinary tract infection.

## 2025-05-19 ENCOUNTER — RESULTS FOLLOW-UP (OUTPATIENT)
Dept: UROLOGY | Facility: CLINIC | Age: 70
End: 2025-05-19

## 2025-05-20 ENCOUNTER — TELEPHONE (OUTPATIENT)
Dept: UROLOGY | Facility: CLINIC | Age: 70
End: 2025-05-20
Payer: MEDICARE

## 2025-05-20 NOTE — TELEPHONE ENCOUNTER
Left patient a VM about test results, also that abx were called into [facility]. Requested call back to schedule nurse visit for repeat culture in 3 wks.

## 2025-05-20 NOTE — TELEPHONE ENCOUNTER
----- Message from Florencio Armstrong MD sent at 5/19/2025  8:15 AM CDT -----  Urine appears infected await ID and sensitivity  ----- Message -----  From: Lab, Background User  Sent: 5/16/2025  10:12 PM CDT  To: Florencio Armstrong MD

## 2025-05-20 NOTE — TELEPHONE ENCOUNTER
----- Message from Jennifer sent at 5/20/2025  3:36 PM CDT -----  Regarding: missed call  Type:  Patient Returning CallWho Called:ptDianna Left Message for Patient:WesleyTabby the patient know what this is regarding?:yesWould the patient rather a call back or a response via [facility]? callBest Call Back Number: [phone] Information:

## 2025-05-20 NOTE — TELEPHONE ENCOUNTER
Contacted patient about abx that was called into the pharmacy. She declined to schedule a 3 wk repeat culture because she states she's going out of town.  She states she will call upon her return.

## 2025-05-27 ENCOUNTER — TELEPHONE (OUTPATIENT)
Dept: UROLOGY | Facility: CLINIC | Age: 70
End: 2025-05-27
Payer: MEDICARE

## 2025-05-27 NOTE — TELEPHONE ENCOUNTER
----- Message from Gabrielle sent at 5/27/2025  2:04 PM CDT -----  Type:  Needs Medical AdviceWho Called: ptWould the patient rather a call back or a response via [facility]? callBest Call Back Number:  [phone] Information: asking to speak with nurse regarding bringing in specimen

## 2025-05-28 ENCOUNTER — TELEPHONE (OUTPATIENT)
Dept: UROLOGY | Facility: CLINIC | Age: 70
End: 2025-05-28
Payer: MEDICARE

## 2025-05-28 NOTE — TELEPHONE ENCOUNTER
----- Message from Gracie sent at 5/28/2025 10:13 AM CDT -----  Regarding: Call back  Contact: [phone]  Who Called: PT Patient called in requesting to speak with you. Did not specify why just said regards to her appointment and having trouble with transportation. Please advise.

## 2025-05-28 NOTE — TELEPHONE ENCOUNTER
Patient did not show for nurse visit. She was actually supposed repeat urine culture 3 wks from 5/19. Left her a VM stating she'll need to reschedule nurse visit.

## 2025-05-28 NOTE — TELEPHONE ENCOUNTER
Patient did not answer when called , a telephone number was left for her to call the clinic back.

## 2025-05-29 ENCOUNTER — TELEPHONE (OUTPATIENT)
Dept: UROLOGY | Facility: CLINIC | Age: 70
End: 2025-05-29
Payer: MEDICARE

## 2025-05-29 NOTE — TELEPHONE ENCOUNTER
Spoke with patient to schedule nurse visit for urine culture in 3 wks . Patient verbally understood.

## 2025-05-29 NOTE — TELEPHONE ENCOUNTER
----- Message from REJI Chicas sent at 5/29/2025 11:16 AM CDT -----    ----- Message -----  From: Christiane Aburto MA  Sent: 5/29/2025  10:48 AM CDT  To: Aviva Montoya RN      ----- Message -----  From: Gian Benton  Sent: 5/29/2025  10:44 AM CDT  To: Patti CARREON Staff    Type: Patient Call Back Who called: Self   What is the request in detail pt requesting to speak with nurse Aviva Montoya RN  Can the clinic reply by [facility]? Would the patient rather a call back or a response via [facility]? Call back  Best call back number: [phone] Additional Information:

## 2025-05-30 ENCOUNTER — TELEPHONE (OUTPATIENT)
Dept: INTERNAL MEDICINE | Facility: CLINIC | Age: 70
End: 2025-05-30
Payer: MEDICARE

## 2025-05-30 NOTE — TELEPHONE ENCOUNTER
----- Message from Zonia sent at 5/29/2025  1:26 PM CDT -----  Contact: Patient @  type: LabCaller is requesting to schedule their Lab appointment prior to an appointment.Order is not listed in EPIC.  Please enter order and contact patient to schedule.Preferred Date and Time of Labs: 06 06 2025 10 am Date of Appointment: 06 13 2025Where would they like the lab performed?[facility] Would the patient rather a call back or a response via [facility]? Call Best Call Back Number:  Additional Information:na

## 2025-06-05 ENCOUNTER — RESULTS FOLLOW-UP (OUTPATIENT)
Dept: UROLOGY | Facility: CLINIC | Age: 70
End: 2025-06-05

## 2025-06-09 ENCOUNTER — CLINICAL SUPPORT (OUTPATIENT)
Dept: UROLOGY | Facility: CLINIC | Age: 70
End: 2025-06-09
Payer: MEDICARE

## 2025-06-09 ENCOUNTER — TELEPHONE (OUTPATIENT)
Dept: UROLOGY | Facility: CLINIC | Age: 70
End: 2025-06-09

## 2025-06-09 VITALS — DIASTOLIC BLOOD PRESSURE: 65 MMHG | SYSTOLIC BLOOD PRESSURE: 103 MMHG

## 2025-06-09 DIAGNOSIS — Z93.59 SUPRAPUBIC CATHETER: ICD-10-CM

## 2025-06-09 DIAGNOSIS — Z93.59 SUPRAPUBIC CATHETER: Primary | ICD-10-CM

## 2025-06-09 DIAGNOSIS — N31.9 NEUROGENIC BLADDER: Primary | ICD-10-CM

## 2025-06-09 PROCEDURE — 87086 URINE CULTURE/COLONY COUNT: CPT

## 2025-06-09 PROCEDURE — 99999 PR PBB SHADOW E&M-EST. PATIENT-LVL II: CPT | Mod: PBBFAC,,,

## 2025-06-09 NOTE — TELEPHONE ENCOUNTER
I called patient to notify her of Dr. Armstrong's referral to infectious disease. I will reach out to ID team directly for scheduling.

## 2025-06-09 NOTE — TELEPHONE ENCOUNTER
----- Message from Florencio Armstrong MD sent at 6/9/2025  3:04 PM CDT -----  Patient needs referral to Infectious Disease as culture not sensitive to any oral medications and probably does not need to be treated as she is probably colonized.  Would like Infectious Disease   evaluation and opinion.  ----- Message -----  From: Lab, Background User  Sent: 6/4/2025   5:31 PM CDT  To: Florencio Armstrong MD

## 2025-06-09 NOTE — PROGRESS NOTES
Patient came in for nurse visit to get urine cx from spt. Cleaned tube port with alcohol. Waited 10 min for patient to get urine out of tube. Patient was able to give a urine sample. Patient tolerated well.

## 2025-06-13 LAB — BACTERIA UR CULT: ABNORMAL

## 2025-06-16 ENCOUNTER — PATIENT MESSAGE (OUTPATIENT)
Dept: INTERNAL MEDICINE | Facility: CLINIC | Age: 70
End: 2025-06-16
Payer: MEDICARE

## 2025-06-16 DIAGNOSIS — E03.9 HYPOTHYROIDISM (ACQUIRED): ICD-10-CM

## 2025-06-16 RX ORDER — LEVOTHYROXINE SODIUM 150 UG/1
150 TABLET ORAL
Qty: 30 TABLET | Refills: 2 | Status: SHIPPED | OUTPATIENT
Start: 2025-06-16 | End: 2025-09-14

## 2025-06-17 NOTE — ASSESSMENT & PLAN NOTE
C/w CPAP at night - per respiratory note, patient refused overnight

## 2025-06-19 ENCOUNTER — TELEPHONE (OUTPATIENT)
Dept: UROLOGY | Facility: CLINIC | Age: 70
End: 2025-06-19
Payer: MEDICARE

## 2025-06-19 NOTE — TELEPHONE ENCOUNTER
----- Message from Florencio Armstrong MD sent at 6/17/2025 11:36 AM CDT -----  Urinary tract infection appears to be sensitive to minocycline.  Prescription sent to [facility].  ----- Message -----  From: Lab, Background User  Sent: 6/10/2025   9:58 PM CDT  To: Florencio Armstrong MD

## 2025-06-20 ENCOUNTER — OFFICE VISIT (OUTPATIENT)
Dept: INTERNAL MEDICINE | Facility: CLINIC | Age: 70
End: 2025-06-20
Payer: MEDICARE

## 2025-06-20 VITALS — HEIGHT: 66 IN | OXYGEN SATURATION: 97 % | HEART RATE: 85 BPM | WEIGHT: 211.63 LBS | BODY MASS INDEX: 34.01 KG/M2

## 2025-06-20 DIAGNOSIS — I89.0 LYMPHEDEMA OF BOTH LOWER EXTREMITIES: ICD-10-CM

## 2025-06-20 DIAGNOSIS — N39.0 RECURRENT UTI: Primary | ICD-10-CM

## 2025-06-20 DIAGNOSIS — R51.9 CHRONIC NONINTRACTABLE HEADACHE, UNSPECIFIED HEADACHE TYPE: ICD-10-CM

## 2025-06-20 DIAGNOSIS — G89.29 CHRONIC NONINTRACTABLE HEADACHE, UNSPECIFIED HEADACHE TYPE: ICD-10-CM

## 2025-06-20 DIAGNOSIS — E27.40 ADRENAL INSUFFICIENCY: ICD-10-CM

## 2025-06-20 DIAGNOSIS — G47.01 INSOMNIA DUE TO MEDICAL CONDITION: Chronic | ICD-10-CM

## 2025-06-20 DIAGNOSIS — Z43.5 ATTENTION TO CYSTOSTOMY: ICD-10-CM

## 2025-06-20 DIAGNOSIS — F33.0 MAJOR DEPRESSIVE DISORDER, RECURRENT, MILD: ICD-10-CM

## 2025-06-20 PROCEDURE — 99999 PR PBB SHADOW E&M-EST. PATIENT-LVL III: CPT | Mod: PBBFAC,HCNC,, | Performed by: INTERNAL MEDICINE

## 2025-06-20 RX ORDER — QUETIAPINE FUMARATE 25 MG/1
25 TABLET, FILM COATED ORAL 2 TIMES DAILY
Qty: 180 TABLET | Refills: 3 | Status: SHIPPED | OUTPATIENT
Start: 2025-06-20 | End: 2026-06-20

## 2025-06-20 RX ORDER — HYDROCORTISONE 10 MG/1
10 TABLET ORAL DAILY
Qty: 90 TABLET | Refills: 3 | Status: SHIPPED | OUTPATIENT
Start: 2025-06-20 | End: 2026-06-15

## 2025-06-20 RX ORDER — HYDROCORTISONE 5 MG/1
5 TABLET ORAL
Qty: 90 TABLET | Refills: 3 | Status: SHIPPED | OUTPATIENT
Start: 2025-06-20 | End: 2026-06-15

## 2025-06-20 RX ORDER — QUETIAPINE FUMARATE 100 MG/1
100 TABLET, FILM COATED ORAL NIGHTLY
Qty: 90 TABLET | Refills: 3 | Status: SHIPPED | OUTPATIENT
Start: 2025-06-20 | End: 2026-06-15

## 2025-06-20 RX ORDER — TRAZODONE HYDROCHLORIDE 300 MG/1
150 TABLET ORAL NIGHTLY
Qty: 45 TABLET | Refills: 3 | Status: SHIPPED | OUTPATIENT
Start: 2025-06-20

## 2025-06-20 NOTE — PROGRESS NOTES
Subjective:       Patient ID: Tasha Hawley is a 69 y.o. female.    Chief Complaint:   Annual Exam    HPI - chronically ill patient here for follow-up.  Since our last visit in March, she has missed or canceled multiple appointments at the last minute.  She has difficulty with transportation.  She is here with a friend today.  She was in good spirits.  She is concerned about another urinary tract infection.  She has had difficulty getting an appointment with infectious diseases for this episode, but ID reached out over the phone and started antibiotics.  She is taking those.  Her psychiatrist released her from care because of multiple missed visits.  She needs refills on some of her psychoactive medications.  Her lower extremity edema is improved today.     PMH:  Multiple SDOH interfering with care  Neurogenic bladder, with recurrent UTI's. Suprapubic catheter  CAD, with ACS in Jan 2016  Ischemic cardiomyopathy  Chronic insomnia  Gross Lymphedema bilateral LE's with intermittent weeping and skin breakdown  Chronic low back pain with indwelling narcotic pump  History CVA with dysarthria and swallowing difficulty  Hypothyroidism  CECI, on CPAP  Anxiety and Depression  Chronic constipation, d/t ongoing narcotic use.  Dependent on home oxygen     Meds: Reviewed and reconciled in EPIC with patient during visit today.     Review of Systems   Constitutional:  Negative for fever.   HENT:  Negative for congestion.    Respiratory:  Negative for shortness of breath.    Cardiovascular:  Positive for leg swelling (improved).   Gastrointestinal:  Negative for abdominal pain.   Genitourinary:  Negative for difficulty urinating.   Musculoskeletal:  Positive for back pain (improved).   Skin:  Negative for rash.   Neurological:  Positive for headaches.   Psychiatric/Behavioral:  Positive for sleep disturbance.        Objective:      Physical Exam  Vitals reviewed.   Constitutional:       Appearance: She is well-developed.       Comments: Chronically ill woman who appears older than stated age.  Examined in wheelchair because of difficulty with ambulation   HENT:      Head: Normocephalic and atraumatic.   Cardiovascular:      Rate and Rhythm: Normal rate and regular rhythm.      Heart sounds: Normal heart sounds. No murmur heard.     No friction rub. No gallop.   Pulmonary:      Effort: Pulmonary effort is normal. No respiratory distress.      Breath sounds: Normal breath sounds. No wheezing or rales.   Chest:      Chest wall: No tenderness.   Musculoskeletal:      Right lower leg: Edema present.      Left lower leg: Edema present.   Skin:     General: Skin is warm and dry.      Findings: No erythema.   Neurological:      General: No focal deficit present.      Mental Status: She is alert.   Psychiatric:         Mood and Affect: Mood normal.         Assessment:       1. Recurrent UTI    2. Attention to cystostomy    3. Chronic nonintractable headache, unspecified headache type    4. Insomnia due to medical condition    5. Adrenal insufficiency    6. Lymphedema of both lower extremities    7. Major depressive disorder, recurrent, mild        Plan:       Tasha was seen today for annual exam.    Diagnoses and all orders for this visit:    Recurrent UTI - while she has a plethora of medical conditions, this is her most urgent right now.  Long-term management per Infectious diseases and Urology.    Attention to cystostomy - indwelling suprapubic catheter.  Working fine today.  Monitor    Chronic nonintractable headache, unspecified headache type - worsening.  Hopefully refills will help.    Insomnia due to medical condition - fairly stable today on too much medication.  Unable to stop because she will not sleep without it.  Refills  -     QUEtiapine (SEROQUEL) 100 MG Tab; Take 1 tablet (100 mg total) by mouth nightly.  -     traZODone (DESYREL) 300 MG tablet; Take 0.5 tablets (150 mg total) by mouth nightly.  -     QUEtiapine (SEROQUEL) 25 MG  Tab; Take 1 tablet (25 mg total) by mouth 2 (two) times daily.    Adrenal insufficiency - she did not understand this diagnosis.  I encouraged her to take the medication and to read about it.  Refills provided  -     hydrocortisone (CORTEF) 5 MG Tab; Take 1 tablet (5 mg total) by mouth before dinner.  -     hydrocortisone (CORTEF) 10 MG Tab; Take 1 tablet (10 mg total) by mouth once daily.    Lymphedema of both lower extremities - improving.  Gave positive feedback on walking more    Major depressive disorder, recurrent, mild - improved.  Monitor    RTC 3 months for multiple medical problems    PAPO Hodges MD MPH  Staff Internist

## 2025-06-24 ENCOUNTER — TELEPHONE (OUTPATIENT)
Dept: UROLOGY | Facility: CLINIC | Age: 70
End: 2025-06-24
Payer: MEDICARE

## 2025-06-24 NOTE — TELEPHONE ENCOUNTER
Copied from CRM #[id number]. Topic: Appointments - Appointment Rescheduling  >> Jun 24, 2025  8:58 AM Gracie wrote:  Who Called: PT     Patient is calling to talk to nurse in regards to rescheduling her procedure.

## 2025-06-24 NOTE — TELEPHONE ENCOUNTER
Copied from CRM #[id number]. Topic: Appointments - Appointment Access  >> Jun 24, 2025  3:57 PM Audrey wrote:  .Type:  Needs Medical Advice    Who Called: pt    Would the patient rather a call back or a response via [facility]? Call back  Best Call Back Number: [phone]  Additional Information:      Pt stated she would like a call back to reschedule her appt she missed today

## 2025-06-25 ENCOUNTER — TELEPHONE (OUTPATIENT)
Dept: UROLOGY | Facility: CLINIC | Age: 70
End: 2025-06-25
Payer: MEDICARE

## 2025-06-25 NOTE — TELEPHONE ENCOUNTER
Copied from CRM #[id number]. Topic: General Inquiry - Return Call  >> Jun 25, 2025  1:44 PM Gabrielle wrote:  Type:  Patient Returning Call    Who Called:pt  Who Left Message for Patient:Aviva Montoya RN  Does the patient know what this is regarding?:Return Call  Would the patient rather a call back or a response via [facility]? call  Best Call Back Number:[phone]  Additional Information:

## 2025-06-25 NOTE — TELEPHONE ENCOUNTER
Patient called was return she stated she missed her appointment cause she was not feeling well. When patient was offer a next day appointment with Nurse practioner she refused the appt said she would rather wait for Dr. Armstrong. Patient also states she will not go to Emergency Room for her SPT to be changed. Patient was told that there were no upcoming appointments with . Patient instead took a later appointment for August 1, 2025 with Dr. Armstrong and said she would like Nurse Chicas to give her a call back.

## 2025-06-25 NOTE — TELEPHONE ENCOUNTER
I returned patient's call, I let her know that Dr. Armstrong would be out for several weeks in July and I would try to set up alternate arrangements for a SPT change in Dr. Armstrong's absence. I also let her know that Dr. Armstrong is interested in starting one week of home Zosyn infusion, she said she was agreeable to this and I could place a referral to [facility].

## 2025-06-26 ENCOUNTER — TELEPHONE (OUTPATIENT)
Dept: GASTROENTEROLOGY | Facility: CLINIC | Age: 70
End: 2025-06-26
Payer: MEDICARE

## 2025-06-26 NOTE — TELEPHONE ENCOUNTER
----- Message from [place] sent at 2025  8:48 AM CDT -----  Regarding: Appointment Requested  Tasha salazar (Patient)     Subject  Tasha Hawley (Patient)     Topic  General Inquiry - Patient Advice  I spoke with the patient on the phone and she would like to schedule a Gastro Appointment at the Main [place] location for Dysphagia.    Communication  Type:  Needs Medical Advice        Who Called: Patient    Symptoms (please be specific): na    How long has patient had these symptoms:  na    Pharmacy name and phone #:  na    Would the patient rather a call back or a response via [facility]? Call back    Best Call Back Number: [phone]    Additional Information: Pt is requesting a call back asap regarding questions concerning her current condition(Dysphagia)      Patient Information    Patient Name Gender  SSN  Tasha Hawley Female 1955 [id number]    Contacts    Contact Date/Time Type Contact Identity Validated Phone/Fax  2025 08:15 AM CDT Phone (Incoming) Tasha Hawley (Self) , Phone, Address [phone]    Routing History     From To Priority  2025 08:31 AM Kenneth Menendez STAFF Routine

## 2025-06-27 ENCOUNTER — TELEPHONE (OUTPATIENT)
Dept: UROLOGY | Facility: CLINIC | Age: 70
End: 2025-06-27
Payer: MEDICARE

## 2025-06-27 DIAGNOSIS — Z16.30 ANTIBIOTIC-RESISTANT BACTERIAL INFECTION: Primary | ICD-10-CM

## 2025-06-27 DIAGNOSIS — A49.9 ANTIBIOTIC-RESISTANT BACTERIAL INFECTION: Primary | ICD-10-CM

## 2025-06-27 RX ORDER — PIPERACILLIN SODIUM, TAZOBACTAM SODIUM 3; .375 G/15ML; G/15ML
3.38 INJECTION, POWDER, LYOPHILIZED, FOR SOLUTION INTRAVENOUS EVERY 6 HOURS
Qty: 135 G | Refills: 0 | Status: SHIPPED | OUTPATIENT
Start: 2025-06-27 | End: 2025-06-27 | Stop reason: CLARIF

## 2025-06-27 NOTE — TELEPHONE ENCOUNTER
Copied from CRM #[id number]. Topic: General Inquiry - Patient Advice  >> Jun 27, 2025 11:50 AM Cat wrote:  Type:  Call Back     Who Called:pt     Does the patient know what this is regarding?:Appointment   Would the patient rather a call back or a response via [facility]? Call   Best Call Back Number:  Additional Information:

## 2025-06-30 ENCOUNTER — TELEPHONE (OUTPATIENT)
Dept: GASTROENTEROLOGY | Facility: CLINIC | Age: 70
End: 2025-06-30
Payer: MEDICARE

## 2025-06-30 NOTE — TELEPHONE ENCOUNTER
MA scheduled the patient to see Dr. Cortés on 8/15/2025 at 1:00. A reminder letter was sent out as well.

## 2025-07-01 ENCOUNTER — TELEPHONE (OUTPATIENT)
Dept: UROLOGY | Facility: CLINIC | Age: 70
End: 2025-07-01
Payer: MEDICARE

## 2025-07-01 NOTE — TELEPHONE ENCOUNTER
I called to notify the patient she needs her SPT changed sooner than her appt on 8/1/25 with Dr. Armstrong (last changed 5/16/25 ). While Dr. Armstrong is out, [facility] has agreed to change some SPT's. I called to offer her an appt with Zaida so that it can be changed in a timely fashion. CALL CENTER: please reach out via teams if patient calls back.

## 2025-07-01 NOTE — TELEPHONE ENCOUNTER
Copied from CRM #[id number]. Topic: General Inquiry - Patient Advice  >> Jul 1, 2025  1:40 PM Yesenia wrote:  Type:  Needs Medical Advice    Who Called: pt Navdeep MO  Would the patient rather a call back or a response via [facility]? Call back   Best Call Back Number: [phone]  Additional Information: Navdeep WHITE requesting a call back in regards to if pt need mid line or pick line pt current in office   Patient requests all Lab, Cardiology, and Radiology Results on their Discharge Instructions

## 2025-07-01 NOTE — TELEPHONE ENCOUNTER
I returned Navdeep's call, let him know that patient can have midline, PICC or peripheral line. He states he was able to place a midline IV which Dr. Armstrong stated is perfectly fine.

## 2025-07-01 NOTE — TELEPHONE ENCOUNTER
I spoke with Dr. Armstrong who agreed to change Zosyn to 13.5 mg continuous infusion instead of dosing it q6h as is their normal practice. He said that was fine. Patient got midline iv today, patient is set to begin infusion tomorrow per Jaycee at [facility].

## 2025-07-01 NOTE — TELEPHONE ENCOUNTER
----- Message from Florencio Armstrong MD sent at 6/25/2025  2:08 PM CDT -----  3.375 g q 6 hours x 10 days  ----- Message -----  From: Aviva Montoya RN  Sent: 6/24/2025   5:25 PM CDT  To: Florencio Armstrong MD    Ok just let me know about dosing and I'll see if I can put it in.  ----- Message -----  From: Florencio Armstrong MD  Sent: 6/24/2025  10:39 AM CDT  To: Aviva Montoya RN    You have patient do course of Zosyn for 1 week  ----- Message -----  From: Aviva Montoya RN  Sent: 6/23/2025   5:54 PM CDT  To: Florencio Armstrong MD    Patient is scheduled with ID in August however I can arrange a course of zosyn sooner than that if you would like.    Aviva  ----- Message -----  From: Florencio Armstrong MD  Sent: 6/9/2025   3:04 PM CDT  To: Aviva Montoya RN    Patient needs referral to Infectious Disease as culture not sensitive to any oral medications and probably does not need to be treated as she is probably colonized.  Would like Infectious Disease   evaluation and opinion.  ----- Message -----  From: Lab, Background User  Sent: 6/4/2025   5:31 PM CDT  To: Florencio Armstrong MD

## 2025-07-02 ENCOUNTER — TELEPHONE (OUTPATIENT)
Dept: UROLOGY | Facility: CLINIC | Age: 70
End: 2025-07-02
Payer: MEDICARE

## 2025-07-02 NOTE — TELEPHONE ENCOUNTER
Copied from CRM #[id number]. Topic: General Inquiry - Patient Advice  >> Jul 2, 2025  9:35 AM Yesenia wrote:  .Type:  Needs Medical Advice    Who Called: pt   Would the patient rather a call back or a response via "[employer]ner? Call back   Best Call Back Number: [phone] (M)    Additional Information: pt requesting a call back in regards to medical reasons

## 2025-07-07 NOTE — ASSESSMENT & PLAN NOTE
??? Patient came to 1600 stating that he went to fill his script for atorvastatin at [facility] (he works there) and was told that the script went to [facility] (???) which from what I can understand [facility] doesn't have.  He is asking that a script be sent to [facility] which he will  tomorrow when he gets to work.   Cont prozac

## 2025-07-08 ENCOUNTER — OFFICE VISIT (OUTPATIENT)
Dept: UROLOGY | Facility: CLINIC | Age: 70
End: 2025-07-08
Payer: MEDICARE

## 2025-07-08 VITALS
HEART RATE: 91 BPM | BODY MASS INDEX: 33.4 KG/M2 | DIASTOLIC BLOOD PRESSURE: 56 MMHG | SYSTOLIC BLOOD PRESSURE: 100 MMHG | WEIGHT: 207.81 LBS | HEIGHT: 66 IN

## 2025-07-08 DIAGNOSIS — Z93.59 SUPRAPUBIC CATHETER: Chronic | ICD-10-CM

## 2025-07-08 DIAGNOSIS — N31.9 NEUROGENIC BLADDER: Primary | Chronic | ICD-10-CM

## 2025-07-08 PROCEDURE — 1100F PTFALLS ASSESS-DOCD GE2>/YR: CPT | Mod: CPTII,HCNC,S$GLB,

## 2025-07-08 PROCEDURE — 3074F SYST BP LT 130 MM HG: CPT | Mod: CPTII,HCNC,S$GLB,

## 2025-07-08 PROCEDURE — 99499 UNLISTED E&M SERVICE: CPT | Mod: HCNC,S$GLB,,

## 2025-07-08 PROCEDURE — 1159F MED LIST DOCD IN RCRD: CPT | Mod: CPTII,HCNC,S$GLB,

## 2025-07-08 PROCEDURE — 3078F DIAST BP <80 MM HG: CPT | Mod: CPTII,HCNC,S$GLB,

## 2025-07-08 PROCEDURE — 1125F AMNT PAIN NOTED PAIN PRSNT: CPT | Mod: CPTII,HCNC,S$GLB,

## 2025-07-08 PROCEDURE — 51705 CHANGE OF BLADDER TUBE: CPT | Mod: HCNC,S$GLB,,

## 2025-07-08 PROCEDURE — 1160F RVW MEDS BY RX/DR IN RCRD: CPT | Mod: CPTII,HCNC,S$GLB,

## 2025-07-08 PROCEDURE — 3008F BODY MASS INDEX DOCD: CPT | Mod: CPTII,HCNC,S$GLB,

## 2025-07-08 PROCEDURE — 99999 PR PBB SHADOW E&M-EST. PATIENT-LVL IV: CPT | Mod: PBBFAC,HCNC,,

## 2025-07-08 PROCEDURE — 3288F FALL RISK ASSESSMENT DOCD: CPT | Mod: CPTII,HCNC,S$GLB,

## 2025-07-08 NOTE — PROGRESS NOTES
Subjective:       Patient ID: Tasha Hawley is a 69 y.o. female.    Chief Complaint: SPT change     This is a 69 y.o.  female patient that is new to me but not new to the system.  This is a patient of Dr. Armstrong. History of neurogenic bladder who has a suprapubic tube. She is here today for SPT change. Previously getting SPT changes with . Last SPT change was 5/2025 in the ER. Was scheduled for SPT change with Dr. Armstrong 6/2025 but patient no showed. Endorses no other complaints today.        Lab Results   Component Value Date    CREATININE 1.0 06/04/2025       ---  PMH/PSH/Medications/Allergies/Social history reviewed and as in chart.    Review of Systems    Objective:      Physical Exam  Genitourinary:     Comments: Suprapubic catheter in place, balloon deflated and SPT removed without difficulty, cleaned SPT insertion site with betadine then used sterile technique to insert a new 22fr SPT, urine seen and balloon inflated with 10cc, irrigated SPT tube with 50cc of sterile water but fluid leaked out into urethra. Attempted to reinsert catheter about 5 times but fluid leaked through urethra each time with balloon inflated. Using sterile technique a 16fr meadows catheter was placed into the urethra without difficulty, urine seen and meadows drainage bag connected.      Assessment:     Problem Noted   Neurogenic Bladder 9/27/2016       Plan:     [place]ows catheter placed into urethra using sterile technique.   Advised patient that SPT may close during this time, patient voiced understanding  Follow-up with Dr. Armstrong 8/20/25    HEVER Hale    I spent a total of 60 minutes on the day of the visit.This includes face to face time and non-face to face time preparing to see the patient (eg, review of tests), obtaining and/or reviewing separately obtained history, documenting clinical information in the electronic or other health record, independently interpreting results and communicating results to the  patient/family/caregiver, or care coordinator.

## 2025-07-10 ENCOUNTER — TELEPHONE (OUTPATIENT)
Dept: UROLOGY | Facility: CLINIC | Age: 70
End: 2025-07-10
Payer: MEDICARE

## 2025-07-10 NOTE — TELEPHONE ENCOUNTER
Copied from CRM #[id number]. Topic: General Inquiry - Patient Advice  >> Jul 10, 2025  9:11 AM Yesenia wrote:  Type:  Needs Medical Advice    Who Called: pt Robert with [facility]  Would the patient rather a call back or a response via [facility]? Call back   Best Call Back Number: [phone]  Additional Information: Robert with [facility] requesting a call back in regards to   IV antibotics therapy her ends tomorrow 07/11 does  wants to discontinue therapy and continue service

## 2025-07-10 NOTE — TELEPHONE ENCOUNTER
Patient inquiring about a procedure on the 20th she was wondering if its to put the tube back in.         Copied from CRM #[id number]. Topic: General Inquiry - Patient Advice  >> Jul 10, 2025  9:47 AM Etienne wrote:  Type: Requesting to speak with nurse    Who Called: PT  Regarding: would like to speak to Aviva   Would the patient rather a call back or a response via [facility]? Call back  Best Call Back Number: [phone] (M)  Additional Information:

## 2025-07-11 ENCOUNTER — TELEPHONE (OUTPATIENT)
Dept: UROLOGY | Facility: CLINIC | Age: 70
End: 2025-07-11
Payer: MEDICARE

## 2025-07-11 DIAGNOSIS — N32.81 DETRUSOR OVERACTIVITY: ICD-10-CM

## 2025-07-11 NOTE — TELEPHONE ENCOUNTER
Copied from CRM #[id number]. Topic: General Inquiry - Patient Advice  >> Jul 11, 2025 10:50 AM Gabrielle wrote:  Type:  Needs Medical Advice    Who Called: [facility] pharmacy  Would the patient rather a call back or a response via [facility]? call  Best Call Back Number:   Additional Information: states pt antibiotics end today wanting to get end of care from office or if therapy should be extended

## 2025-07-11 NOTE — TELEPHONE ENCOUNTER
I returned call from [facility], spoke with Rosemarie. She was asking if we are stopping therapy since 10 days complete. I responded that we are not extending therapy and the line can be removed.

## 2025-07-11 NOTE — TELEPHONE ENCOUNTER
Was calling patient to see if she tried to call the pharmacy for refill because they should still have refills on requested medication. Lvm for patient to call back.

## 2025-07-15 ENCOUNTER — TELEPHONE (OUTPATIENT)
Dept: UROLOGY | Facility: CLINIC | Age: 70
End: 2025-07-15
Payer: MEDICARE

## 2025-07-15 DIAGNOSIS — N32.81 DETRUSOR OVERACTIVITY: ICD-10-CM

## 2025-07-15 RX ORDER — DARIFENACIN 15 MG/1
15 TABLET, EXTENDED RELEASE ORAL DAILY
Qty: 30 TABLET | Refills: 0 | Status: SHIPPED | OUTPATIENT
Start: 2025-07-15 | End: 2025-08-14

## 2025-07-15 RX ORDER — DARIFENACIN 15 MG/1
TABLET, EXTENDED RELEASE ORAL
Qty: 90 TABLET | Refills: 0 | OUTPATIENT
Start: 2025-07-15

## 2025-07-15 NOTE — TELEPHONE ENCOUNTER
Patient was returning Elida call, Elida had called patient to see if she contacted the pharmacy for the refills she needed. Patient was not sure if she needs to the medication darifenacin hydrobromide 15 mg. Patient asked if Nurse Aviva can return her call. If she do than she needs a refill on that particular medication.

## 2025-07-15 NOTE — TELEPHONE ENCOUNTER
Copied from CRM #[id number]. Topic: General Inquiry - Return Call  >> Jul 14, 2025  8:25 AM Almita wrote:  Type:  Patient Returning Call    Who Called:Patient Tasha  Who Left Message for Patient:Elida  Does the patient know what this is regarding?:Medical  Would the patient rather a call back or a response via [facility]? call  Best Call Back Number:[phone]  Additional Information:

## 2025-07-22 ENCOUNTER — TELEPHONE (OUTPATIENT)
Dept: UROLOGY | Facility: CLINIC | Age: 70
End: 2025-07-22
Payer: MEDICARE

## 2025-07-22 NOTE — TELEPHONE ENCOUNTER
Copied from CRM #[id number]. Topic: General Inquiry - Patient Advice  >> Jul 22, 2025  4:08 PM Jeaneth wrote:  Type:  Needs Medical Advice    Who Called: Pt   Symptoms (please be specific): pt states that she would like to know if she can take a bath despite having catheter inserted    Would the patient rather a call back or a response via [facility]? Call back   Best Call Back Number: [phone]  Additional Information: Please be advised, pt states that she would like a call back as soon as possible before office closes today

## 2025-07-22 NOTE — TELEPHONE ENCOUNTER
Patient was called and advised to take showers at this time per Provider Noe. Patient verbally understood.

## 2025-07-23 RX ORDER — FUROSEMIDE 40 MG/1
40 TABLET ORAL
Qty: 90 TABLET | Refills: 3 | Status: SHIPPED | OUTPATIENT
Start: 2025-07-23

## 2025-07-25 ENCOUNTER — TELEPHONE (OUTPATIENT)
Dept: UROLOGY | Facility: CLINIC | Age: 70
End: 2025-07-25
Payer: MEDICARE

## 2025-07-25 NOTE — TELEPHONE ENCOUNTER
Patient was called today to be offered a sooner appointment to see Dr. Armstrong for 2 pm. She declined offer and said call when we get a sooner appointment. Her appointment is scheduled 8/20/25.

## 2025-07-25 NOTE — TELEPHONE ENCOUNTER
Patient was made aware this is sooner but decline an said her tubes was out anyway she would rather another day instead. And for out office to call when we have another cancellation.

## 2025-08-01 DIAGNOSIS — N31.9 NEUROGENIC BLADDER: ICD-10-CM

## 2025-08-01 DIAGNOSIS — N32.81 DETRUSOR OVERACTIVITY: Primary | ICD-10-CM

## 2025-08-03 DIAGNOSIS — K21.9 GASTROESOPHAGEAL REFLUX DISEASE, UNSPECIFIED WHETHER ESOPHAGITIS PRESENT: ICD-10-CM

## 2025-08-04 ENCOUNTER — TELEPHONE (OUTPATIENT)
Dept: UROLOGY | Facility: CLINIC | Age: 70
End: 2025-08-04
Payer: MEDICARE

## 2025-08-04 RX ORDER — PANTOPRAZOLE SODIUM 40 MG/1
TABLET, DELAYED RELEASE ORAL
Qty: 90 TABLET | Refills: 3 | Status: SHIPPED | OUTPATIENT
Start: 2025-08-04

## 2025-08-04 RX ORDER — DARIFENACIN 7.5 MG/1
7.5 TABLET, EXTENDED RELEASE ORAL DAILY
Qty: 90 TABLET | Refills: 3 | Status: SHIPPED | OUTPATIENT
Start: 2025-08-04 | End: 2025-08-05 | Stop reason: SDUPTHER

## 2025-08-04 NOTE — TELEPHONE ENCOUNTER
Refill Decision Note   Tasha Hawley  is requesting a refill authorization.  Brief Assessment and Rationale for Refill:  Approve     Medication Therapy Plan:        Comments:     Note composed:12:21 PM 08/04/2025            
No care due was identified.  [facility] Embedded Care Due Messages. Reference number: [id number].   8/03/2025 1:07:46 PM CDT  
No

## 2025-08-05 ENCOUNTER — TELEPHONE (OUTPATIENT)
Dept: UROLOGY | Facility: CLINIC | Age: 70
End: 2025-08-05
Payer: MEDICARE

## 2025-08-05 DIAGNOSIS — M79.672 CHRONIC PAIN OF BOTH FEET: ICD-10-CM

## 2025-08-05 DIAGNOSIS — G47.01 INSOMNIA DUE TO MEDICAL CONDITION: Chronic | ICD-10-CM

## 2025-08-05 DIAGNOSIS — N31.9 NEUROGENIC BLADDER: ICD-10-CM

## 2025-08-05 DIAGNOSIS — R52 INTRACTABLE PAIN: ICD-10-CM

## 2025-08-05 DIAGNOSIS — N32.81 DETRUSOR OVERACTIVITY: ICD-10-CM

## 2025-08-05 DIAGNOSIS — E27.40 ADRENAL INSUFFICIENCY: ICD-10-CM

## 2025-08-05 DIAGNOSIS — M79.671 CHRONIC PAIN OF BOTH FEET: ICD-10-CM

## 2025-08-05 DIAGNOSIS — G89.29 CHRONIC PAIN OF BOTH FEET: ICD-10-CM

## 2025-08-05 RX ORDER — HYDROCORTISONE 5 MG/1
5 TABLET ORAL
Qty: 90 TABLET | Refills: 3 | Status: SHIPPED | OUTPATIENT
Start: 2025-08-05 | End: 2026-07-31

## 2025-08-05 RX ORDER — DARIFENACIN 7.5 MG/1
7.5 TABLET, EXTENDED RELEASE ORAL DAILY
Qty: 90 TABLET | Refills: 3 | Status: SHIPPED | OUTPATIENT
Start: 2025-08-05 | End: 2026-08-05

## 2025-08-05 RX ORDER — TRAZODONE HYDROCHLORIDE 300 MG/1
150 TABLET ORAL NIGHTLY
Qty: 45 TABLET | Refills: 3 | Status: SHIPPED | OUTPATIENT
Start: 2025-08-05

## 2025-08-05 RX ORDER — AMITRIPTYLINE HYDROCHLORIDE 25 MG/1
25 TABLET, FILM COATED ORAL NIGHTLY PRN
Qty: 90 TABLET | Refills: 3 | Status: SHIPPED | OUTPATIENT
Start: 2025-08-05

## 2025-08-05 RX ORDER — DULOXETIN HYDROCHLORIDE 60 MG/1
60 CAPSULE, DELAYED RELEASE ORAL 2 TIMES DAILY
Qty: 180 CAPSULE | Refills: 2 | Status: SHIPPED | OUTPATIENT
Start: 2025-08-05

## 2025-08-05 RX ORDER — HYDROCORTISONE 10 MG/1
10 TABLET ORAL DAILY
Qty: 90 TABLET | Refills: 3 | Status: SHIPPED | OUTPATIENT
Start: 2025-08-05 | End: 2026-07-31

## 2025-08-05 NOTE — TELEPHONE ENCOUNTER
Copied from CRM #[id number]. Topic: General Inquiry - Patient Advice  >> Aug 5, 2025 11:32 AM Gabrielle wrote:  Type:  Needs Medical Advice    Who Called: pt  Would the patient rather a call back or a response via [facility]? call  Best Call Back Number: [phone]  Additional Information: pt would like to speak to Aviva regarding some concerns with her stomach

## 2025-08-05 NOTE — TELEPHONE ENCOUNTER
No care due was identified.  [facility] Embedded Care Due Messages. Reference number: [id number].   8/05/2025 11:46:36 AM CDT

## 2025-08-13 ENCOUNTER — TELEPHONE (OUTPATIENT)
Dept: UROLOGY | Facility: CLINIC | Age: 70
End: 2025-08-13
Payer: MEDICARE

## 2025-08-14 ENCOUNTER — TELEPHONE (OUTPATIENT)
Dept: GASTROENTEROLOGY | Facility: CLINIC | Age: 70
End: 2025-08-14
Payer: MEDICARE

## 2025-08-18 ENCOUNTER — OFFICE VISIT (OUTPATIENT)
Dept: INFECTIOUS DISEASES | Facility: CLINIC | Age: 70
End: 2025-08-18
Payer: MEDICARE

## 2025-08-18 VITALS — HEIGHT: 66 IN | WEIGHT: 207.88 LBS | BODY MASS INDEX: 33.41 KG/M2

## 2025-08-18 DIAGNOSIS — T83.510D URINARY TRACT INFECTION ASSOCIATED WITH CYSTOSTOMY CATHETER, SUBSEQUENT ENCOUNTER: Primary | ICD-10-CM

## 2025-08-18 DIAGNOSIS — N39.0 URINARY TRACT INFECTION ASSOCIATED WITH CYSTOSTOMY CATHETER, SUBSEQUENT ENCOUNTER: Primary | ICD-10-CM

## 2025-08-18 PROCEDURE — 1101F PT FALLS ASSESS-DOCD LE1/YR: CPT | Mod: CPTII,HCNC,S$GLB, | Performed by: INTERNAL MEDICINE

## 2025-08-18 PROCEDURE — 3288F FALL RISK ASSESSMENT DOCD: CPT | Mod: CPTII,HCNC,S$GLB, | Performed by: INTERNAL MEDICINE

## 2025-08-18 PROCEDURE — 1125F AMNT PAIN NOTED PAIN PRSNT: CPT | Mod: CPTII,HCNC,S$GLB, | Performed by: INTERNAL MEDICINE

## 2025-08-18 PROCEDURE — 3008F BODY MASS INDEX DOCD: CPT | Mod: CPTII,HCNC,S$GLB, | Performed by: INTERNAL MEDICINE

## 2025-08-18 PROCEDURE — 1159F MED LIST DOCD IN RCRD: CPT | Mod: CPTII,HCNC,S$GLB, | Performed by: INTERNAL MEDICINE

## 2025-08-18 PROCEDURE — 99999 PR PBB SHADOW E&M-EST. PATIENT-LVL V: CPT | Mod: PBBFAC,HCNC,, | Performed by: INTERNAL MEDICINE

## 2025-08-18 PROCEDURE — 99213 OFFICE O/P EST LOW 20 MIN: CPT | Mod: HCNC,S$GLB,, | Performed by: INTERNAL MEDICINE

## 2025-08-20 ENCOUNTER — PROCEDURE VISIT (OUTPATIENT)
Dept: UROLOGY | Facility: CLINIC | Age: 70
End: 2025-08-20
Payer: MEDICARE

## 2025-08-20 VITALS — HEART RATE: 69 BPM | SYSTOLIC BLOOD PRESSURE: 121 MMHG | DIASTOLIC BLOOD PRESSURE: 68 MMHG | OXYGEN SATURATION: 95 %

## 2025-08-20 DIAGNOSIS — Z43.5 ATTENTION TO CYSTOSTOMY: Primary | ICD-10-CM

## 2025-08-20 DIAGNOSIS — N31.9 NEUROGENIC BLADDER: Chronic | ICD-10-CM

## 2025-08-20 PROCEDURE — 99215 OFFICE O/P EST HI 40 MIN: CPT | Mod: HCNC,S$GLB,, | Performed by: UROLOGY

## 2025-08-20 RX ORDER — LIDOCAINE HYDROCHLORIDE 20 MG/ML
JELLY TOPICAL ONCE
Status: CANCELLED | OUTPATIENT
Start: 2025-08-20 | End: 2025-08-20

## 2025-08-20 RX ORDER — SODIUM CHLORIDE 9 MG/ML
INJECTION, SOLUTION INTRAVENOUS CONTINUOUS
Status: CANCELLED | OUTPATIENT
Start: 2025-08-20

## 2025-08-20 RX ORDER — CIPROFLOXACIN 2 MG/ML
400 INJECTION, SOLUTION INTRAVENOUS
Status: CANCELLED | OUTPATIENT
Start: 2025-08-20

## 2025-08-21 PROBLEM — N31.9 NEUROGENIC BLADDER DISORDER: Status: ACTIVE | Noted: 2025-08-21

## 2025-08-22 ENCOUNTER — TELEPHONE (OUTPATIENT)
Dept: UROLOGY | Facility: CLINIC | Age: 70
End: 2025-08-22
Payer: MEDICARE

## 2025-08-22 DIAGNOSIS — E78.2 MIXED HYPERLIPIDEMIA: ICD-10-CM

## 2025-08-22 RX ORDER — ATORVASTATIN CALCIUM 80 MG/1
80 TABLET, FILM COATED ORAL
Qty: 90 TABLET | Refills: 0 | Status: SHIPPED | OUTPATIENT
Start: 2025-08-22

## 2025-08-25 ENCOUNTER — OFFICE VISIT (OUTPATIENT)
Dept: INTERNAL MEDICINE | Facility: CLINIC | Age: 70
End: 2025-08-25
Payer: MEDICARE

## 2025-08-25 ENCOUNTER — PATIENT OUTREACH (OUTPATIENT)
Dept: ADMINISTRATIVE | Facility: CLINIC | Age: 70
End: 2025-08-25
Payer: MEDICARE

## 2025-08-25 VITALS
HEART RATE: 81 BPM | WEIGHT: 215.38 LBS | OXYGEN SATURATION: 98 % | SYSTOLIC BLOOD PRESSURE: 112 MMHG | DIASTOLIC BLOOD PRESSURE: 68 MMHG | HEIGHT: 64 IN | BODY MASS INDEX: 36.77 KG/M2

## 2025-08-25 DIAGNOSIS — L72.9 SKIN CYST: ICD-10-CM

## 2025-08-25 DIAGNOSIS — I89.0 LYMPHEDEMA OF BOTH LOWER EXTREMITIES: ICD-10-CM

## 2025-08-25 DIAGNOSIS — G47.01 INSOMNIA DUE TO MEDICAL CONDITION: Chronic | ICD-10-CM

## 2025-08-25 DIAGNOSIS — G89.4 CHRONIC PAIN SYNDROME: Chronic | ICD-10-CM

## 2025-08-25 DIAGNOSIS — Z93.59 SUPRAPUBIC CATHETER: Chronic | ICD-10-CM

## 2025-08-25 DIAGNOSIS — N31.9 NEUROGENIC BLADDER: Primary | Chronic | ICD-10-CM

## 2025-08-25 DIAGNOSIS — R53.81 PHYSICAL DECONDITIONING: ICD-10-CM

## 2025-08-25 PROCEDURE — 3008F BODY MASS INDEX DOCD: CPT | Mod: CPTII,HCNC,S$GLB, | Performed by: INTERNAL MEDICINE

## 2025-08-25 PROCEDURE — 3074F SYST BP LT 130 MM HG: CPT | Mod: CPTII,HCNC,S$GLB, | Performed by: INTERNAL MEDICINE

## 2025-08-25 PROCEDURE — 99999 PR PBB SHADOW E&M-EST. PATIENT-LVL V: CPT | Mod: PBBFAC,HCNC,, | Performed by: INTERNAL MEDICINE

## 2025-08-25 PROCEDURE — 1101F PT FALLS ASSESS-DOCD LE1/YR: CPT | Mod: CPTII,HCNC,S$GLB, | Performed by: INTERNAL MEDICINE

## 2025-08-25 PROCEDURE — 99214 OFFICE O/P EST MOD 30 MIN: CPT | Mod: HCNC,S$GLB,, | Performed by: INTERNAL MEDICINE

## 2025-08-25 PROCEDURE — 1160F RVW MEDS BY RX/DR IN RCRD: CPT | Mod: CPTII,HCNC,S$GLB, | Performed by: INTERNAL MEDICINE

## 2025-08-25 PROCEDURE — 3288F FALL RISK ASSESSMENT DOCD: CPT | Mod: CPTII,HCNC,S$GLB, | Performed by: INTERNAL MEDICINE

## 2025-08-25 PROCEDURE — 1126F AMNT PAIN NOTED NONE PRSNT: CPT | Mod: CPTII,HCNC,S$GLB, | Performed by: INTERNAL MEDICINE

## 2025-08-25 PROCEDURE — 3078F DIAST BP <80 MM HG: CPT | Mod: CPTII,HCNC,S$GLB, | Performed by: INTERNAL MEDICINE

## 2025-08-25 PROCEDURE — 1111F DSCHRG MED/CURRENT MED MERGE: CPT | Mod: CPTII,HCNC,S$GLB, | Performed by: INTERNAL MEDICINE

## 2025-08-25 PROCEDURE — 1159F MED LIST DOCD IN RCRD: CPT | Mod: CPTII,HCNC,S$GLB, | Performed by: INTERNAL MEDICINE

## 2025-08-29 ENCOUNTER — TELEPHONE (OUTPATIENT)
Dept: SURGERY | Facility: CLINIC | Age: 70
End: 2025-08-29
Payer: MEDICARE

## 2025-09-02 ENCOUNTER — TELEPHONE (OUTPATIENT)
Dept: UROLOGY | Facility: CLINIC | Age: 70
End: 2025-09-02
Payer: MEDICARE

## 2025-09-03 ENCOUNTER — OFFICE VISIT (OUTPATIENT)
Dept: UROLOGY | Facility: CLINIC | Age: 70
End: 2025-09-03
Payer: MEDICARE

## 2025-09-03 VITALS — DIASTOLIC BLOOD PRESSURE: 84 MMHG | OXYGEN SATURATION: 95 % | HEART RATE: 63 BPM | SYSTOLIC BLOOD PRESSURE: 122 MMHG

## 2025-09-03 DIAGNOSIS — N31.9 NEUROGENIC BLADDER: Primary | ICD-10-CM

## 2025-09-03 DIAGNOSIS — Z01.818 PRE-OP TESTING: Primary | ICD-10-CM

## 2025-09-03 DIAGNOSIS — N32.81 DETRUSOR OVERACTIVITY: ICD-10-CM

## 2025-09-03 DIAGNOSIS — Z01.810 PREOP CARDIOVASCULAR EXAM: ICD-10-CM

## 2025-09-03 DIAGNOSIS — R82.71 BACTERIURIA: ICD-10-CM

## 2025-09-03 DIAGNOSIS — N39.41 URGE URINARY INCONTINENCE: ICD-10-CM

## 2025-09-03 DIAGNOSIS — N32.89 BLADDER SPASM: ICD-10-CM

## 2025-09-03 LAB
BACTERIA #/AREA URNS AUTO: ABNORMAL /HPF
BILIRUB UR QL STRIP.AUTO: NEGATIVE
CLARITY UR: CLEAR
COLOR UR AUTO: YELLOW
GLUCOSE UR QL STRIP: NEGATIVE
HGB UR QL STRIP: ABNORMAL
HYALINE CASTS UR QL AUTO: 0 /LPF (ref 0–1)
KETONES UR QL STRIP: NEGATIVE
LEUKOCYTE ESTERASE UR QL STRIP: ABNORMAL
MICROSCOPIC COMMENT: ABNORMAL
NITRITE UR QL STRIP: NEGATIVE
PH UR STRIP: 7 [PH]
PROT UR QL STRIP: ABNORMAL
RBC #/AREA URNS AUTO: 48 /HPF (ref 0–4)
SP GR UR STRIP: 1.02
SQUAMOUS #/AREA URNS AUTO: <1 /HPF
UROBILINOGEN UR STRIP-ACNC: NEGATIVE EU/DL
WBC #/AREA URNS AUTO: 22 /HPF (ref 0–5)

## 2025-09-03 PROCEDURE — 99999 PR PBB SHADOW E&M-EST. PATIENT-LVL V: CPT | Mod: PBBFAC,HCNC,, | Performed by: UROLOGY

## 2025-09-03 PROCEDURE — 81001 URINALYSIS AUTO W/SCOPE: CPT | Mod: HCNC | Performed by: UROLOGY

## 2025-09-03 PROCEDURE — 87086 URINE CULTURE/COLONY COUNT: CPT | Mod: HCNC | Performed by: UROLOGY

## 2025-09-03 RX ORDER — LIDOCAINE HYDROCHLORIDE 20 MG/ML
JELLY TOPICAL ONCE
OUTPATIENT
Start: 2025-09-03 | End: 2025-09-03

## 2025-09-03 RX ORDER — CIPROFLOXACIN 2 MG/ML
400 INJECTION, SOLUTION INTRAVENOUS
OUTPATIENT
Start: 2025-09-03

## 2025-09-03 RX ORDER — CEPHALEXIN 500 MG/1
500 CAPSULE ORAL 4 TIMES DAILY
Qty: 40 CAPSULE | Refills: 0 | Status: SHIPPED | OUTPATIENT
Start: 2025-09-03 | End: 2025-09-14

## 2025-09-03 RX ORDER — SODIUM CHLORIDE 9 MG/ML
INJECTION, SOLUTION INTRAVENOUS CONTINUOUS
OUTPATIENT
Start: 2025-09-03

## 2025-09-05 DIAGNOSIS — B37.49 CANDIDURIA: Primary | ICD-10-CM

## 2025-09-05 LAB — BACTERIA UR CULT: ABNORMAL

## 2025-09-05 RX ORDER — FLUCONAZOLE 200 MG/1
200 TABLET ORAL DAILY
Qty: 10 TABLET | Refills: 0 | Status: SHIPPED | OUTPATIENT
Start: 2025-09-05 | End: 2025-09-16

## (undated) DEVICE — GOWN SMARTGOWN LVL4 X-LONG XL

## (undated) DEVICE — DRAPE INCISE IOBAN 2 23X17IN

## (undated) DEVICE — SYR SLIP TIP 1CC

## (undated) DEVICE — NDL SPINAL SPINOCAN 22GX3.5

## (undated) DEVICE — STAPLER SKIN PROXIMATE WIDE

## (undated) DEVICE — TRAY SKIN SCRUB WET PREMIUM

## (undated) DEVICE — UNDERGLOVES BIOGEL PI SIZE 7.5

## (undated) DEVICE — SET CYSTO IRRIGATION UNIV SPIK

## (undated) DEVICE — Device

## (undated) DEVICE — SYR 50ML CATH TIP

## (undated) DEVICE — LIDOCAINE HCI JLLY2%5ML UROJET

## (undated) DEVICE — UNDERGLOVES BIOGEL PI SZ 6 LF

## (undated) DEVICE — SYR 10CC LUER LOCK

## (undated) DEVICE — SET IRR URLGY 2LINE UNIV SPIKE

## (undated) DEVICE — SEE MEDLINE ITEM 152622

## (undated) DEVICE — SEE MEDLINE ITEM 146313

## (undated) DEVICE — DRAPE THREE-QTR REINF 53X77IN

## (undated) DEVICE — LUBRICANT SURGILUBE 2 OZ

## (undated) DEVICE — SEE MEDLINE ITEM 154981

## (undated) DEVICE — PACK CYSTO

## (undated) DEVICE — NDL 25GA 5FR 35MM

## (undated) DEVICE — BNDG COFLEX FOAM LF2 ST 4X5YD

## (undated) DEVICE — SUT VICRYL 3-0 27 SH

## (undated) DEVICE — TRAY CYSTO BASIN

## (undated) DEVICE — NDL WILLIAMS CYSTOSCOPIC

## (undated) DEVICE — SOL IRR NACL .9% 3000ML

## (undated) DEVICE — BANDAGE ADHESIVE

## (undated) DEVICE — SOL IRR WATER STRL 3000 ML

## (undated) DEVICE — TRAY FOLEY 16FR INFECTION CONT

## (undated) DEVICE — CATH FOLEY SIL 2WAY 5CC 20FR

## (undated) DEVICE — SUT SILK 2-0 SH 18IN BLACK

## (undated) DEVICE — DRAPE STERI INSTRUMENT 1018

## (undated) DEVICE — GOWN SURGICAL X-LARGE

## (undated) DEVICE — GLOVE BIOGEL 7.5

## (undated) DEVICE — NDL SPINAL 18GX3.5 SPINOCAN

## (undated) DEVICE — ADAPTER HOSE 10FT 8MM

## (undated) DEVICE — BINDER ABDOMINAL 9 30-45

## (undated) DEVICE — SOL NACL IRR 3000ML

## (undated) DEVICE — SEE MEDLINE ITEM 157181

## (undated) DEVICE — NDL MACROPLASTIQUE

## (undated) DEVICE — SYR ONLY LUER LOCK 20CC

## (undated) DEVICE — TRAY CYSTO BASIN OMC

## (undated) DEVICE — SPONGE COTTON TRAY 4X4IN

## (undated) DEVICE — STOCKINET 4INX48

## (undated) DEVICE — SUT VICRYL+ 2-0 SH 18IN

## (undated) DEVICE — GAUZE SPONGE 4X4 12PLY

## (undated) DEVICE — SEE MEDLINE ITEM 152186

## (undated) DEVICE — GOWN X-LG STERILE BACK

## (undated) DEVICE — NDL 18GA X1 1/2 REG BEVEL

## (undated) DEVICE — NDL BLUNT TIP 16GX1/2

## (undated) DEVICE — SUT PROLENE 4-0 MONO 18IN

## (undated) DEVICE — BINDER ABDOMINAL 9 46-62

## (undated) DEVICE — NDL HYPO REG 25G X 1 1/2

## (undated) DEVICE — ADHESIVE DERMABOND ADVANCED

## (undated) DEVICE — GLOVE BIOGEL PI MICRO INDIC 8

## (undated) DEVICE — BANDAGE DERMACEA STRETCH 4X1IN

## (undated) DEVICE — COVER OVERHEAD SURG LT BLUE

## (undated) DEVICE — SEE MEDLINE ITEM 157148

## (undated) DEVICE — DRESSING ADAPTIC TOUCH 3X2

## (undated) DEVICE — BAG DRAIN ANTI REFLUX 2000ML

## (undated) DEVICE — SUT ETHILON 2-0 FSLX 30 BLK

## (undated) DEVICE — CLIPPER BLADE MOD 4406 (CAREF)

## (undated) DEVICE — ELECTRODE REM PLYHSV RETURN 9

## (undated) DEVICE — BLADE SURG CARBON STEEL SZ11

## (undated) DEVICE — DRESSING TRANS 4X4 TEGADERM

## (undated) DEVICE — BLADE SCALP OPHTL BEVEL STR

## (undated) DEVICE — SPONGE DERMACEA GAUZE 4X4

## (undated) DEVICE — BLADE SURG #15 CARBON STEEL

## (undated) DEVICE — UNDERGLOVES BIOGEL PI SZ 7 LF

## (undated) DEVICE — SUT VICRYL PLUS 4-0 P3 18IN

## (undated) DEVICE — UNDERGLOVE BIOGEL PI SZ 6.5 LF

## (undated) DEVICE — STRIP MEDI WND CLSR 1/2X4IN

## (undated) DEVICE — BUR BONE CUT MICRO TPS 3X3.8MM

## (undated) DEVICE — SUT 3-0 VICRYL / RB-1

## (undated) DEVICE — SUT ETHILON 3-0 PS2 18 BLK

## (undated) DEVICE — SYR 3CC LUER LOC

## (undated) DEVICE — TUBE FRAZIER 5MM 2FT SOFT TIP

## (undated) DEVICE — CONTAINER SPECIMEN OR STER 4OZ

## (undated) DEVICE — DRAPE C-ARM ELAS CLIP 42X120IN

## (undated) DEVICE — PACK PERI/GYN OPTIMA

## (undated) DEVICE — SET BASIN 48X48IN 6000ML RING

## (undated) DEVICE — SUT VICRYL PLUS 2-0

## (undated) DEVICE — PACK LAPSCP/PELVSCPY III TIBRN

## (undated) DEVICE — CATH STATLOCK MULTI-PURPOS

## (undated) DEVICE — SOL NS 1000CC

## (undated) DEVICE — PACK CYSTOSCOPY III SIRUS

## (undated) DEVICE — SET CYSTO IRRIGATING

## (undated) DEVICE — RETRACTOR STERILE PLASTIC G2

## (undated) DEVICE — SYR SALINE PREFILLED FLSH 10ML

## (undated) DEVICE — BANDAGE MATRIX HK LOOP 4IN 5YD

## (undated) DEVICE — GLOVE BIOGEL ECLIPSE SZ 8

## (undated) DEVICE — SEE L#120831

## (undated) DEVICE — SYR 30CC LUER LOCK

## (undated) DEVICE — SYR SALINE FLSH PRFL STRL 10ML

## (undated) DEVICE — TAPE MEDIPORE 4IN X 2YDS

## (undated) DEVICE — PAD CAST SPECIALIST STRL 4

## (undated) DEVICE — ADHESIVE MASTISOL VIAL 48/BX

## (undated) DEVICE — BAG URINARY DRAINAGE 2000ML

## (undated) DEVICE — DRAPE STERI-DRAPE 1000 17X11IN

## (undated) DEVICE — ELECTRODE BLADE INSULATED 1 IN

## (undated) DEVICE — TRAY MINOR GEN SURG

## (undated) DEVICE — DURAPREP SURG SCRUB 26ML

## (undated) DEVICE — NDL SPINAL 22GA X 3 1/2 IN

## (undated) DEVICE — DRAPE TOP 53X102IN

## (undated) DEVICE — STOCKINET TUBULAR 1 PLY 6X60IN

## (undated) DEVICE — TRAY NEURO OMC

## (undated) DEVICE — APPLICATOR CHLORAPREP ORN 26ML

## (undated) DEVICE — CORD BIPOLAR 12 FOOT

## (undated) DEVICE — CHLORAPREP 10.5 ML APPLICATOR

## (undated) DEVICE — PAD PREP CUFFED NS 24X48IN

## (undated) DEVICE — SUT VICRYL PLUS 3-0 SH 18IN

## (undated) DEVICE — CLOSURE SKIN STERI STRIP 1/2X4

## (undated) DEVICE — DRAPE T TRNSVRS LAP 102X78X121

## (undated) DEVICE — SKIN MARKER DEVON 160

## (undated) DEVICE — SUT 2-0 VICRYL / SH (J417)

## (undated) DEVICE — SYR TB SLIP TIP W/O NDL 1ML

## (undated) DEVICE — SUT MCRYL PLUS 4-0 PS2 27IN

## (undated) DEVICE — UNIVERSAL BINDER 12IN

## (undated) DEVICE — DRESSING TELFA N ADH 3X8